# Patient Record
Sex: FEMALE | Race: WHITE | NOT HISPANIC OR LATINO | Employment: PART TIME | URBAN - METROPOLITAN AREA
[De-identification: names, ages, dates, MRNs, and addresses within clinical notes are randomized per-mention and may not be internally consistent; named-entity substitution may affect disease eponyms.]

---

## 2017-04-06 ENCOUNTER — CONVERSION ENCOUNTER (OUTPATIENT)
Dept: RADIOLOGY | Facility: IMAGING CENTER | Age: 47
End: 2017-04-06

## 2017-08-24 LAB — TSH SERPL-ACNC: 3.9 MIU/L

## 2018-06-15 ENCOUNTER — TRANSCRIBE ORDERS (OUTPATIENT)
Dept: ADMINISTRATIVE | Facility: HOSPITAL | Age: 48
End: 2018-06-15

## 2018-06-15 DIAGNOSIS — Z12.31 VISIT FOR SCREENING MAMMOGRAM: Primary | ICD-10-CM

## 2018-07-10 ENCOUNTER — OFFICE VISIT (OUTPATIENT)
Dept: FAMILY MEDICINE CLINIC | Facility: CLINIC | Age: 48
End: 2018-07-10
Payer: MEDICARE

## 2018-07-10 VITALS
OXYGEN SATURATION: 84 % | HEIGHT: 66 IN | WEIGHT: 293 LBS | DIASTOLIC BLOOD PRESSURE: 68 MMHG | HEART RATE: 90 BPM | BODY MASS INDEX: 47.09 KG/M2 | TEMPERATURE: 96.3 F | RESPIRATION RATE: 16 BRPM | SYSTOLIC BLOOD PRESSURE: 122 MMHG

## 2018-07-10 DIAGNOSIS — I10 ESSENTIAL HYPERTENSION: ICD-10-CM

## 2018-07-10 DIAGNOSIS — R60.9 EDEMA, UNSPECIFIED TYPE: ICD-10-CM

## 2018-07-10 DIAGNOSIS — R53.82 CHRONIC FATIGUE: Primary | ICD-10-CM

## 2018-07-10 PROBLEM — R13.10 DYSPHAGIA: Status: ACTIVE | Noted: 2017-12-22

## 2018-07-10 PROBLEM — E03.9 HYPOTHYROIDISM: Status: ACTIVE | Noted: 2017-03-17

## 2018-07-10 PROBLEM — G47.33 OSA (OBSTRUCTIVE SLEEP APNEA): Status: ACTIVE | Noted: 2018-04-24

## 2018-07-10 PROBLEM — R07.2 PRECORDIAL PAIN: Status: ACTIVE | Noted: 2017-01-21

## 2018-07-10 PROBLEM — F31.9 BIPOLAR DISORDER (HCC): Status: ACTIVE | Noted: 2017-03-17

## 2018-07-10 PROCEDURE — 99214 OFFICE O/P EST MOD 30 MIN: CPT | Performed by: NURSE PRACTITIONER

## 2018-07-10 RX ORDER — ZOLPIDEM TARTRATE 10 MG/1
10 TABLET ORAL DAILY
COMMUNITY
Start: 2017-12-05 | End: 2018-07-23

## 2018-07-10 RX ORDER — LEVOTHYROXINE SODIUM 0.05 MG/1
TABLET ORAL EVERY 24 HOURS
COMMUNITY
Start: 2017-10-11 | End: 2018-08-29

## 2018-07-10 RX ORDER — OMEPRAZOLE 40 MG/1
40 CAPSULE, DELAYED RELEASE ORAL 2 TIMES DAILY
COMMUNITY
End: 2018-09-19 | Stop reason: HOSPADM

## 2018-07-10 RX ORDER — GABAPENTIN 300 MG/1
600 CAPSULE ORAL
Refills: 0 | COMMUNITY
Start: 2018-05-26 | End: 2018-07-10

## 2018-07-10 RX ORDER — FUROSEMIDE 20 MG/1
20 TABLET ORAL DAILY
COMMUNITY
End: 2019-04-08

## 2018-07-10 RX ORDER — FESOTERODINE FUMARATE 4 MG/1
4 TABLET, EXTENDED RELEASE ORAL
COMMUNITY
Start: 2018-05-02 | End: 2018-07-10

## 2018-07-10 RX ORDER — LOSARTAN POTASSIUM 50 MG/1
50 TABLET ORAL DAILY
Refills: 0 | COMMUNITY
Start: 2018-06-16 | End: 2019-04-08 | Stop reason: SDUPTHER

## 2018-07-10 RX ORDER — PRAMIPEXOLE DIHYDROCHLORIDE 0.25 MG/1
TABLET ORAL
COMMUNITY
Start: 2017-12-05 | End: 2018-07-10

## 2018-07-10 RX ORDER — MONTELUKAST SODIUM 4 MG/1
1 TABLET, CHEWABLE ORAL 2 TIMES DAILY
Refills: 6 | COMMUNITY
Start: 2018-06-21 | End: 2018-07-10

## 2018-07-10 RX ORDER — POTASSIUM CHLORIDE 20 MEQ/1
20 TABLET, EXTENDED RELEASE ORAL DAILY
Refills: 0 | COMMUNITY
Start: 2018-06-16 | End: 2018-11-14 | Stop reason: SDUPTHER

## 2018-07-10 RX ORDER — LIDOCAINE HYDROCHLORIDE 20 MG/ML
15 INJECTION, SOLUTION INFILTRATION; PERINEURAL
COMMUNITY
End: 2018-07-23

## 2018-07-10 RX ORDER — DICYCLOMINE HYDROCHLORIDE 10 MG/1
10 CAPSULE ORAL 4 TIMES DAILY PRN
Refills: 0 | COMMUNITY
Start: 2018-05-22 | End: 2018-07-10

## 2018-07-10 RX ORDER — HYDROCODONE BITARTRATE AND ACETAMINOPHEN 7.5; 325 MG/1; MG/1
TABLET ORAL
Refills: 0 | COMMUNITY
Start: 2018-06-25 | End: 2018-08-09 | Stop reason: ALTCHOICE

## 2018-07-10 RX ORDER — METOPROLOL TARTRATE 50 MG/1
50 TABLET, FILM COATED ORAL 2 TIMES DAILY WITH MEALS
Refills: 0 | COMMUNITY
Start: 2018-05-22 | End: 2018-08-29

## 2018-07-10 RX ORDER — QUETIAPINE FUMARATE 300 MG/1
100 TABLET, FILM COATED ORAL
COMMUNITY
Start: 2015-09-30 | End: 2018-08-09 | Stop reason: ALTCHOICE

## 2018-07-10 RX ORDER — AMLODIPINE BESYLATE 10 MG/1
10 TABLET ORAL DAILY
COMMUNITY
End: 2018-07-10 | Stop reason: SDUPTHER

## 2018-07-10 RX ORDER — FLUCONAZOLE 150 MG/1
TABLET ORAL
COMMUNITY
Start: 2017-09-13 | End: 2018-07-10

## 2018-07-10 RX ORDER — TIZANIDINE 4 MG/1
4 TABLET ORAL EVERY 8 HOURS
COMMUNITY
Start: 2017-05-03 | End: 2018-07-17 | Stop reason: SDUPTHER

## 2018-07-10 RX ORDER — IBUPROFEN 800 MG/1
TABLET ORAL 3 TIMES DAILY
COMMUNITY
Start: 2015-04-29 | End: 2018-08-29

## 2018-07-10 RX ORDER — AMLODIPINE BESYLATE 10 MG/1
10 TABLET ORAL DAILY
Qty: 30 TABLET | Refills: 5 | Status: SHIPPED | OUTPATIENT
Start: 2018-07-10 | End: 2018-09-19 | Stop reason: HOSPADM

## 2018-07-10 RX ORDER — LEVOTHYROXINE SODIUM 0.2 MG/1
250 TABLET ORAL
COMMUNITY
End: 2018-07-10 | Stop reason: SDUPTHER

## 2018-07-10 RX ORDER — SERTRALINE HYDROCHLORIDE 100 MG/1
200 TABLET, FILM COATED ORAL
COMMUNITY
End: 2018-08-09 | Stop reason: ALTCHOICE

## 2018-07-10 RX ORDER — LEVOTHYROXINE SODIUM 0.2 MG/1
TABLET ORAL EVERY 24 HOURS
COMMUNITY
Start: 2017-10-11 | End: 2018-08-29

## 2018-07-10 RX ORDER — ALBUTEROL SULFATE 90 UG/1
AEROSOL, METERED RESPIRATORY (INHALATION)
COMMUNITY
Start: 2014-11-06 | End: 2018-08-14 | Stop reason: SDUPTHER

## 2018-07-10 NOTE — PROGRESS NOTES
Assessment/Plan:    Edema  Chronic lower extremity edema   Sees vascular twice a year  Due soon for u/s          Diagnoses and all orders for this visit:    Chronic fatigue  Seeing psychiatry for med eval   See if medications could be cause      Essential hypertension  -     amLODIPine (NORVASC) 10 mg tablet; Take 1 tablet (10 mg total) by mouth daily    Edema, unspecified type  -     Compression Stocking    Other orders  -     zolpidem (AMBIEN) 10 mg tablet; Take 10 mg by mouth daily  -     Discontinue: amLODIPine (NORVASC) 10 mg tablet; Take 10 mg by mouth daily  -     BusPIRone HCl (BUSPAR PO); Take 15 mg by mouth Every 12 hours  -     Discontinue: colestipol (COLESTID) 1 g tablet; Take 1 g by mouth 2 (two) times a day  -     Discontinue: dicyclomine (BENTYL) 10 mg capsule; Take 10 mg by mouth 4 (four) times a day as needed  -     Discontinue: Fesoterodine Fumarate ER 4 MG TB24; Take 4 mg by mouth  -     Discontinue: fluconazole (DIFLUCAN) 150 mg tablet; take 1 tablet by oral route once  -     furosemide (LASIX) 20 mg tablet; Take 20 mg by mouth  -     Discontinue: gabapentin (NEURONTIN) 300 mg capsule; Take 600 mg by mouth daily at bedtime  -     HYDROcodone-acetaminophen (NORCO) 7 5-325 mg per tablet; TAKE 1 TO 2 TABLETS EVERY 6 HOURS AS NEEDED FOR SEVERE PAIN; MAX 8 PER DAY  -     Discontinue: levothyroxine 200 mcg tablet; Take 250 mcg by mouth  -     lidocaine (XYLOCAINE) 2 %; 15 mL by Transmucosal route every 3 (three) hours  -     losartan (COZAAR) 50 mg tablet;   -     Discontinue: metoprolol tartrate (LOPRESSOR) 25 mg tablet; Take 50 mg by mouth  -     metoprolol tartrate (LOPRESSOR) 50 mg tablet; Take 50 mg by mouth 2 (two) times a day with meals  -     omeprazole (PriLOSEC) 40 MG capsule;  Take 40 mg by mouth  -     potassium chloride (K-DUR,KLOR-CON) 20 mEq tablet;   -     Discontinue: pramipexole (MIRAPEX) 0 25 mg tablet; take 1 tablet by oral route twice a day  -     QUEtiapine (SEROquel) 300 mg tablet; Take 600 mg by mouth  -     sertraline (ZOLOFT) 100 mg tablet; Take 200 mg by mouth    -     levothyroxine (SYNTHROID) 200 mcg tablet; every 24 hours  -     levothyroxine (SYNTHROID) 50 mcg tablet; every 24 hours  -     tiZANidine (ZANAFLEX) 4 mg tablet; Take 4 mg by mouth every 8 (eight) hours  -     albuterol (VENTOLIN HFA) 90 mcg/act inhaler; inhale 2 puff by inhalation route  every 4 - 6 hours as needed  -     ibuprofen (MOTRIN) 800 mg tablet; 3 (three) times a day          Subjective:      Patient ID: Lilly Mendoza is a 56 y o  female  Having issues with sleep  States using CPAP and since then feels trouble waking up  Sleep  states he thinks her meds from psychiatry  Seeing psychiatry tonight  She will discuss the meds  She stopped the gabepentin thinking this triggered her RLS and maybe other symptoms  Her close friend   Long fight with cancer  Her best friend and her are no longer talking  Needs knee replacement and  Feels she can't get this now  Had scoping       Starting to isolate herself  Feeling like she should cut   Talked with therapist, who states if she cuts she is admitting her   Depressed about wt gain             Review of Systems   Constitutional: Positive for activity change, appetite change and fatigue  Negative for fever  HENT: Negative for congestion, rhinorrhea and sore throat  Respiratory: Positive for apnea  Negative for cough, chest tightness and shortness of breath  Cardiovascular: Negative for chest pain  Gastrointestinal: Negative for abdominal pain, constipation and diarrhea  Genitourinary: Negative for dysuria  Musculoskeletal: Positive for arthralgias  Neurological: Negative for dizziness, light-headedness, numbness and headaches  Psychiatric/Behavioral: The patient is nervous/anxious           Feeling depressed          Objective:  Vitals:    07/10/18 0858   BP: 122/68   BP Location: Right arm   Patient Position: Sitting Cuff Size: Large   Pulse: 90   Resp: 16   Temp: (!) 96 3 °F (35 7 °C)   TempSrc: Temporal   SpO2: (!) 84%   Weight: (!) 147 kg (324 lb 1 6 oz)   Height: 5' 5 75" (1 67 m)      Physical Exam   Constitutional: She appears well-developed and well-nourished  Eyes: Conjunctivae are normal  Pupils are equal, round, and reactive to light  Neck: No thyromegaly present  Cardiovascular: Normal rate, regular rhythm and normal heart sounds  No murmur heard  Pulmonary/Chest: Effort normal and breath sounds normal  She has no wheezes  Abdominal: Soft  Bowel sounds are normal    Psychiatric: Her speech is normal and behavior is normal  She exhibits a depressed mood  She expresses no suicidal ideation  Upset about her loss of her friend and her other friends death  Feeling lonely

## 2018-07-10 NOTE — PATIENT INSTRUCTIONS
Edema   WHAT YOU NEED TO KNOW:   What is edema? Edema is swelling throughout your body  Edema is usually a sign that you are retaining fluid  The swelling may be caused by heart failure or kidney, thyroid, or liver disease  It may also be caused by medicines such as antidepressants, blood pressure medicines, or hormones  Sudden swelling around the lips or face may be a sign of a severe allergic reaction  Swelling of an arm or leg may be caused by blockage of your veins  What other signs and symptoms may occur with edema? · Discomfort or tenderness in the swollen areas    · Tight and shiny skin over the swollen areas    · Weight gain  How is edema diagnosed? Your healthcare provider will ask about your symptoms and any other symptoms you have  He may also ask about any medical conditions you have  Your healthcare provider will examine your skin over the swollen areas  He may gently push on the swollen area for a short time to see if this leaves a dimple  He may also order tests to find the cause of your edema  How is edema treated and managed? Treatment for edema depends on the cause  Depending on your medical condition, you may be given medicine to help get rid of extra body fluid  Your healthcare provider may suggest that you do any of the following to help manage edema:  · Elevate  your arms or legs as directed  Raise them above the level of your heart as often as you can  This will help decrease swelling and pain  Prop them on pillows or blankets to keep them elevated comfortably  · Wear pressure stockings as directed  The stockings are tight and put pressure on your legs  This helps to keep fluid from collecting in your legs or ankles  · Limit your salt intake  Salt causes your body to hold water  Ask about any other changes to your diet  · Stay active  Do not stand or sit for long periods of time  Ask your healthcare provider about the best exercise plan for you      · Keep your skin moist using lotion, cream, or ointment  Ask your healthcare provider what to use and how often to use it  When should I contact my healthcare provider? · The swollen area feels cold and is pale or blue in color  · The swollen area feels warm, painful, and is red in color  · You have increased swelling or swelling in other parts of your body  · You have questions or concerns about your condition or care  When should I seek immediate care? · You have shortness of breath at rest, especially when you lie down  · You cough up pink, foamy sputum  · You have chest pain  · Your heartbeat is fast or uneven  CARE AGREEMENT:   You have the right to help plan your care  Learn about your health condition and how it may be treated  Discuss treatment options with your caregivers to decide what care you want to receive  You always have the right to refuse treatment  The above information is an  only  It is not intended as medical advice for individual conditions or treatments  Talk to your doctor, nurse or pharmacist before following any medical regimen to see if it is safe and effective for you  © 2017 [employer]  Information is for End User's use only and may not be sold, redistributed or otherwise used for commercial purposes  All illustrations and images included in CareNotes® are the copyrighted property of [employer]  or [employer]

## 2018-07-16 ENCOUNTER — TELEPHONE (OUTPATIENT)
Dept: FAMILY MEDICINE CLINIC | Facility: CLINIC | Age: 48
End: 2018-07-16

## 2018-07-16 NOTE — TELEPHONE ENCOUNTER
Patient called she said that she was given a script for compression stockings she said that the size needs to be changed to 15-20 size the length is below knee MM HG is 15-20 I did inform her jose isn't in the office TY

## 2018-07-17 DIAGNOSIS — R60.0 EDEMA OF BOTH LEGS: ICD-10-CM

## 2018-07-17 DIAGNOSIS — R60.0 EDEMA OF BOTH LEGS: Primary | ICD-10-CM

## 2018-07-17 DIAGNOSIS — R26.9 ABNORMALITY OF GAIT AND MOBILITY: Primary | ICD-10-CM

## 2018-07-17 RX ORDER — TIZANIDINE 4 MG/1
4 TABLET ORAL 3 TIMES DAILY PRN
Qty: 30 TABLET | Refills: 12 | Status: SHIPPED | OUTPATIENT
Start: 2018-07-17 | End: 2018-09-19 | Stop reason: HOSPADM

## 2018-07-23 ENCOUNTER — HOSPITAL ENCOUNTER (EMERGENCY)
Facility: HOSPITAL | Age: 48
End: 2018-07-24
Attending: EMERGENCY MEDICINE | Admitting: EMERGENCY MEDICINE
Payer: MEDICARE

## 2018-07-23 VITALS
HEART RATE: 85 BPM | WEIGHT: 293 LBS | DIASTOLIC BLOOD PRESSURE: 71 MMHG | OXYGEN SATURATION: 94 % | RESPIRATION RATE: 18 BRPM | TEMPERATURE: 98 F | HEIGHT: 67 IN | SYSTOLIC BLOOD PRESSURE: 144 MMHG | BODY MASS INDEX: 45.99 KG/M2

## 2018-07-23 DIAGNOSIS — T14.91XA SUICIDAL BEHAVIOR WITH ATTEMPTED SELF-INJURY (HCC): Primary | ICD-10-CM

## 2018-07-23 DIAGNOSIS — F32.A DEPRESSED: ICD-10-CM

## 2018-07-23 LAB
AMPHETAMINES SERPL QL SCN: POSITIVE
BACTERIA UR QL AUTO: ABNORMAL /HPF
BARBITURATES UR QL: NEGATIVE
BENZODIAZ UR QL: NEGATIVE
BILIRUB UR QL STRIP: NEGATIVE
CLARITY UR: CLEAR
COCAINE UR QL: NEGATIVE
COLOR UR: YELLOW
ETHANOL EXG-MCNC: NORMAL MG/DL
GLUCOSE UR STRIP-MCNC: NEGATIVE MG/DL
HGB UR QL STRIP.AUTO: ABNORMAL
KETONES UR STRIP-MCNC: NEGATIVE MG/DL
LEUKOCYTE ESTERASE UR QL STRIP: NEGATIVE
METHADONE UR QL: NEGATIVE
NITRITE UR QL STRIP: NEGATIVE
NON-SQ EPI CELLS URNS QL MICRO: ABNORMAL /HPF
OPIATES UR QL SCN: NEGATIVE
PCP UR QL: NEGATIVE
PH UR STRIP.AUTO: 7 [PH] (ref 4.5–8)
PROT UR STRIP-MCNC: >=300 MG/DL
RBC #/AREA URNS AUTO: ABNORMAL /HPF
SP GR UR STRIP.AUTO: 1.02 (ref 1–1.03)
THC UR QL: POSITIVE
UROBILINOGEN UR QL STRIP.AUTO: 0.2 E.U./DL
WBC #/AREA URNS AUTO: ABNORMAL /HPF

## 2018-07-23 PROCEDURE — 81001 URINALYSIS AUTO W/SCOPE: CPT

## 2018-07-23 PROCEDURE — 96372 THER/PROPH/DIAG INJ SC/IM: CPT

## 2018-07-23 PROCEDURE — 80307 DRUG TEST PRSMV CHEM ANLYZR: CPT | Performed by: EMERGENCY MEDICINE

## 2018-07-23 PROCEDURE — 82075 ASSAY OF BREATH ETHANOL: CPT | Performed by: EMERGENCY MEDICINE

## 2018-07-23 PROCEDURE — 90715 TDAP VACCINE 7 YRS/> IM: CPT | Performed by: EMERGENCY MEDICINE

## 2018-07-23 RX ORDER — ZIPRASIDONE MESYLATE 20 MG/ML
20 INJECTION, POWDER, LYOPHILIZED, FOR SOLUTION INTRAMUSCULAR ONCE
Status: COMPLETED | OUTPATIENT
Start: 2018-07-23 | End: 2018-07-23

## 2018-07-23 RX ORDER — ZIPRASIDONE MESYLATE 20 MG/ML
INJECTION, POWDER, LYOPHILIZED, FOR SOLUTION INTRAMUSCULAR
Status: COMPLETED
Start: 2018-07-23 | End: 2018-07-23

## 2018-07-23 RX ORDER — BUPROPION HYDROCHLORIDE 150 MG/1
150 TABLET ORAL 2 TIMES DAILY
COMMUNITY
End: 2018-08-09 | Stop reason: ALTCHOICE

## 2018-07-23 RX ORDER — LORAZEPAM 2 MG/ML
2 INJECTION INTRAMUSCULAR ONCE
Status: COMPLETED | OUTPATIENT
Start: 2018-07-23 | End: 2018-07-23

## 2018-07-23 RX ORDER — PILOCARPINE HYDROCHLORIDE 5 MG/1
5 TABLET, FILM COATED ORAL 3 TIMES DAILY
COMMUNITY
End: 2018-08-29

## 2018-07-23 RX ORDER — FESOTERODINE FUMARATE 4 MG/1
4 TABLET, EXTENDED RELEASE ORAL DAILY
COMMUNITY
End: 2018-08-29

## 2018-07-23 RX ADMIN — WATER 1.2 ML: 1 INJECTION INTRAMUSCULAR; INTRAVENOUS; SUBCUTANEOUS at 22:50

## 2018-07-23 RX ADMIN — LORAZEPAM 2 MG: 2 INJECTION INTRAMUSCULAR; INTRAVENOUS at 22:38

## 2018-07-23 RX ADMIN — ZIPRASIDONE MESYLATE 20 MG: 20 INJECTION, POWDER, LYOPHILIZED, FOR SOLUTION INTRAMUSCULAR at 22:49

## 2018-07-23 RX ADMIN — TETANUS TOXOID, REDUCED DIPHTHERIA TOXOID AND ACELLULAR PERTUSSIS VACCINE, ADSORBED 0.5 ML: 5; 2.5; 8; 8; 2.5 SUSPENSION INTRAMUSCULAR at 21:50

## 2018-07-23 NOTE — ED NOTES
3rd pt belonging bag placed in zone 5 med room counter top along with 2 other bags        Kunal Brownlee  07/23/18 1932

## 2018-07-23 NOTE — ED PROVIDER NOTES
History  Chief Complaint   Patient presents with    Psychiatric Evaluation     911 called by patient's  due to increased cutting to left wrist  Patient states she "wants to hurt herself, but does not have a plan "     HPI  52yo female pmhx bipolar, depression, asthma, HTN, obesity brought in by police for psych evaluation  Patient's  called police due to patient cutting her wrists more frequently  Patient is a poor historian but admits to cutting her left wrist with a razor blade today and her legs last night  Patient says she does want to kill herself by cutting or taking ambien  Patient says a close friend recently  of cancer and she has been very depressed  She admits to smoking marijuana and using meth today  She denies alcohol use  Denies visual or auditory hallucinations  Denies fever, chills, chest pain, SOB, nausea, vomiting, abdominal pain, diarrhea or dysuria  Prior to Admission Medications   Prescriptions Last Dose Informant Patient Reported? Taking?    BusPIRone HCl (BUSPAR PO) 2018 at Unknown time  Yes Yes   Sig: Take 30 mg by mouth Every 12 hours     Fesoterodine Fumarate ER (TOVIAZ) 4 MG  at Unknown time  Yes Yes   Sig: Take 4 mg by mouth daily   HYDROcodone-acetaminophen (NORCO) 7 5-325 mg per tablet   Yes No   Sig: TAKE 1 TO 2 TABLETS EVERY 6 HOURS AS NEEDED FOR SEVERE PAIN; MAX 8 PER DAY   Lurasidone HCl (LATUDA) 60 MG TABS 2018 at Unknown time  Yes Yes   Sig: Take 60 mg by mouth daily   QUEtiapine (SEROquel) 300 mg tablet 2018 at Unknown time  Yes Yes   Sig: Take 100 mg by mouth daily at bedtime     albuterol (VENTOLIN HFA) 90 mcg/act inhaler Past Month at Unknown time  Yes Yes   Sig: inhale 2 puff by inhalation route  every 4 - 6 hours as needed   amLODIPine (NORVASC) 10 mg tablet 2018 at Unknown time  No Yes   Sig: Take 1 tablet (10 mg total) by mouth daily   buPROPion (WELLBUTRIN XL) 150 mg 24 hr tablet 2018 at Unknown time Yes Yes   Sig: Take 150 mg by mouth 2 (two) times a day   furosemide (LASIX) 20 mg tablet 7/23/2018 at Unknown time  Yes Yes   Sig: Take 20 mg by mouth   ibuprofen (MOTRIN) 800 mg tablet 7/23/2018 at Unknown time  Yes Yes   Sig: 3 (three) times a day   levothyroxine (SYNTHROID) 200 mcg tablet 7/23/2018 at Unknown time  Yes Yes   Sig: every 24 hours   levothyroxine (SYNTHROID) 50 mcg tablet 7/23/2018 at Unknown time  Yes Yes   Sig: every 24 hours   losartan (COZAAR) 50 mg tablet 7/23/2018 at Unknown time  Yes Yes   metoprolol tartrate (LOPRESSOR) 50 mg tablet 7/23/2018 at Unknown time  Yes Yes   Sig: Take 50 mg by mouth 2 (two) times a day with meals   omeprazole (PriLOSEC) 40 MG capsule 7/23/2018 at Unknown time  Yes Yes   Sig: Take 40 mg by mouth   pilocarpine (SALAGEN) 5 mg tablet 7/23/2018 at Unknown time  Yes Yes   Sig: Take 5 mg by mouth 3 (three) times a day   potassium chloride (K-DUR,KLOR-CON) 20 mEq tablet 7/23/2018 at Unknown time  Yes Yes   sertraline (ZOLOFT) 100 mg tablet   Yes No   Sig: Take 200 mg by mouth     tiZANidine (ZANAFLEX) 4 mg tablet 7/23/2018 at Unknown time  No Yes   Sig: Take 1 tablet (4 mg total) by mouth 3 (three) times a day as needed for muscle spasms      Facility-Administered Medications: None       Past Medical History:   Diagnosis Date    [facility]     Cellulitis of abdominal wall     Depression 09/16/2008    MARTINEZ (dyspnea on exertion)     Drug overdose 10/28/2008    suicide attempt    Dyspepsia     chronic    Dysphagia     Dyspnea     Edema     High blood pressure     Suicidal ideations        Past Surgical History:   Procedure Laterality Date    BREAST LUMPECTOMY Right     CHOLECYSTECTOMY      5/2003    COLONOSCOPY      01/12/2009    LAPAROSCOPIC HYSTERECTOMY      REPAIR LACERATION Left     left hand-5/18/2009    TONSILECTOMY AND ADNOIDECTOMY         Family History   Problem Relation Age of Onset    Kidney cancer Mother     Colon cancer Family      I have reviewed and agree with the history as documented  Social History   Substance Use Topics    Smoking status: Former Smoker     Types: Cigarettes     Quit date: 1/1/2014    Smokeless tobacco: Former User      Comment: 3 packs per day    Alcohol use No        Review of Systems   Constitutional: Positive for appetite change  Negative for chills, diaphoresis, fatigue and fever  HENT: Negative for congestion, rhinorrhea and sore throat  Eyes: Negative for photophobia and visual disturbance  Respiratory: Negative for cough, chest tightness and shortness of breath  Cardiovascular: Negative for chest pain and palpitations  Gastrointestinal: Negative for abdominal pain, blood in stool, constipation, diarrhea, nausea and vomiting  Genitourinary: Negative for dysuria, frequency and hematuria  Musculoskeletal: Negative for back pain, gait problem, myalgias, neck pain and neck stiffness  Skin: Positive for wound  Negative for pallor and rash  Neurological: Negative for weakness, light-headedness, numbness and headaches  Hematological: Negative for adenopathy  Does not bruise/bleed easily  Psychiatric/Behavioral: Positive for dysphoric mood, self-injury and suicidal ideas  Negative for hallucinations  The patient is nervous/anxious  All other systems reviewed and are negative        Physical Exam  ED Triage Vitals   Temperature Pulse Respirations Blood Pressure SpO2   07/23/18 1706 07/23/18 1715 07/23/18 1715 07/23/18 1715 07/23/18 1715   98 °F (36 7 °C) 94 17 (!) 180/84 95 %      Temp Source Heart Rate Source Patient Position - Orthostatic VS BP Location FiO2 (%)   07/23/18 1706 07/23/18 1715 07/23/18 1715 07/23/18 1715 --   Oral Monitor Sitting Right arm       Pain Score       07/23/18 1715       3           Orthostatic Vital Signs  Vitals:    07/23/18 1928 07/23/18 2050 07/23/18 2158 07/23/18 2330   BP: (!) 211/98 155/70 (!) 174/99 144/71   Pulse: 86 81 80 85   Patient Position - Orthostatic VS: Lying Lying Lying Lying       Physical Exam   Constitutional: She is oriented to person, place, and time  No distress  Patient is alert and oriented, appears dysphoric and does not want to talk but answers when encouraged, morbidly obese   HENT:   Head: Normocephalic and atraumatic  Mouth/Throat: Oropharynx is clear and moist  No oropharyngeal exudate  Eyes: Conjunctivae and EOM are normal  Pupils are equal, round, and reactive to light  Neck: Normal range of motion  Neck supple  Cardiovascular: Normal rate, regular rhythm, normal heart sounds and intact distal pulses  Pulmonary/Chest: Effort normal and breath sounds normal  No respiratory distress  Abdominal: Soft  Bowel sounds are normal  She exhibits no distension  There is no tenderness  Musculoskeletal: Normal range of motion  Superficial lacerations of left wrist, no active bleeding, neurovascularly intact   Lymphadenopathy:     She has no cervical adenopathy  Neurological: She is alert and oriented to person, place, and time  No facial asymmetry noted, CN 2-12 intact, full ROM of upper and lower extremities, muscle strength 5/5 throughout, DTRs normal, sensation intact throughout   Skin: Skin is warm and dry  Capillary refill takes less than 2 seconds  No rash noted  She is not diaphoretic  No erythema  No pallor  Psychiatric:   Depressed, suicidal    Nursing note and vitals reviewed        ED Medications  Medications   tetanus-diphtheria-acellular pertussis (BOOSTRIX) IM injection 0 5 mL (0 5 mL Intramuscular Given 7/23/18 2150)   LORazepam (ATIVAN) 2 mg/mL injection 2 mg (2 mg Intramuscular Given 7/23/18 2238)   ziprasidone (GEODON) IM injection 20 mg (20 mg Intramuscular Given 7/23/18 2249)   sterile water injection **AcuDose Override Pull** (1 2 mL  Given 7/23/18 2250)       Diagnostic Studies  Results Reviewed     Procedure Component Value Units Date/Time    Urine Microscopic [[id number]]  (Abnormal) Collected: 07/23/18 2045    Lab Status:  Final result Specimen:  Urine from Urine, Clean Catch Updated:  07/23/18 2113     RBC, UA None Seen /hpf      WBC, UA None Seen /hpf      Epithelial Cells Moderate (A) /hpf      Bacteria, UA None Seen /hpf     Rapid drug screen, urine [[id number]]  (Abnormal) Collected:  07/23/18 1934    Lab Status:  Final result Specimen:  Urine from Urine, Clean Catch Updated:  07/23/18 2040     Amph/Meth UR Positive (A)     Barbiturate Ur Negative     Benzodiazepine Urine Negative     Cocaine Urine Negative     Methadone Urine Negative     Opiate Urine Negative     PCP Ur Negative     THC Urine Positive (A)    Narrative:         Presumptive report  If requested, specimen will be sent to reference lab for confirmation  FOR MEDICAL PURPOSES ONLY  IF CONFIRMATION NEEDED PLEASE CONTACT THE LAB WITHIN 5 DAYS      Drug Screen Cutoff Levels:  AMPHETAMINE/METHAMPHETAMINES  1000 ng/mL  BARBITURATES     200 ng/mL  BENZODIAZEPINES     200 ng/mL  COCAINE      300 ng/mL  METHADONE      300 ng/mL  OPIATES      300 ng/mL  PHENCYCLIDINE     25 ng/mL  THC       50 ng/mL    ED Urine Macroscopic [[id number]]  (Abnormal) Collected:  07/23/18 2045    Lab Status:  Final result Specimen:  Urine Updated:  07/23/18 2034     Color, UA Yellow     Clarity, UA Clear     pH, UA 7 0     Leukocytes, UA Negative     Nitrite, UA Negative     Protein, UA >=300 (A) mg/dl      Glucose, UA Negative mg/dl      Ketones, UA Negative mg/dl      Urobilinogen, UA 0 2 E U /dl      Bilirubin, UA Negative     Blood, UA Trace (A)     Specific [place], UA 1 025    Narrative:       CLINITEK RESULT    POCT alcohol breath test [[id number]]  (Normal) Resulted:  07/23/18 1718    Lab Status:  Final result Updated:  07/23/18 1718     EXTBreath Alcohol BAT 0 000                 No orders to display         Procedures  Procedures      Phone Consults  ED Phone Contact    ED Course  ED Course as of Jul 24 1440   Mon Jul 23, 2018 1956 Patient was found to be cutting her left wrist while in the bathroom  Placed on one to one  Left wrist irrigated and wrapped  MDM  Number of Diagnoses or Management Options  Depressed:   Suicidal behavior with attempted self-injury [facility]):   Diagnosis management comments: Impression: 54yo female presents for psych evaluation   Ddx: suicidal evaluation   Plan: BAT, urine drug, crisis, admit     CritCare Time    Disposition  Final diagnoses:   Suicidal behavior with attempted self-injury [facility])   Depressed     Time reflects when diagnosis was documented in both MDM as applicable and the Disposition within this note     Time User Action Codes Description Comment    7/23/2018 10:42 PM [facility]il[facility] Suicidal behavior with attempted self-injury ([address] )     7/23/2018 10:42 PM Darío Harrell Add [F32 9] Depressed       ED Disposition     ED Disposition Condition Comment    Transfer to Another [address] should be transferred out to [facility] to care of Dr Milo Salguero MD Documentation      Most Recent Value   Patient Condition  The patient has been stabilized such that within reasonable medical probability, no material deterioration of the patient condition or the condition of the unborn child(jessica) is likely to result from the transfer   Reason for Transfer  Level of Care needed not available at this facility   Benefits of Transfer  Specialized equipment and/or services available at the receiving facility (Include comment)________________________ [psychiatric ]   Risks of Transfer  Potential for delay in receiving treatment   Accepting Physician  Dr Nasir De Jesus Name, [address] [facility]     (Name & Tel number)  Jared Marshall, [phone]   Transported by (Company and Unit #)  [facility]   Provider Certification  The patient is stable for psychiatric transfer because they are medically stable, and is protected from harming him/herself or others during transport      RN Documentation      [facility] Name, [address]     (Name & Tel number)  Regina Erwin, [phone]   Transport Mode  Ambulance   Transported by [employer] and Unit #)  [facility]   Level of Care  Basic life support   Transfer Date  07/24/18   Transfer Time  0130      Follow-up Information    None         Discharge Medication List as of 7/24/2018  1:16 AM      CONTINUE these medications which have NOT CHANGED    Details   albuterol (VENTOLIN HFA) 90 mcg/act inhaler inhale 2 puff by inhalation route  every 4 - 6 hours as needed, Historical Med      amLODIPine (NORVASC) 10 mg tablet Take 1 tablet (10 mg total) by mouth daily, Starting Tue 7/10/2018, Normal      buPROPion (WELLBUTRIN XL) 150 mg 24 hr tablet Take 150 mg by mouth 2 (two) times a day, Historical Med      BusPIRone HCl (BUSPAR PO) Take 30 mg by mouth Every 12 hours  , Starting Mon 6/27/2011, Historical Med      Fesoterodine Fumarate ER (TOVIAZ) 4 MG TB24 Take 4 mg by mouth daily, Historical Med      furosemide (LASIX) 20 mg tablet Take 20 mg by mouth, Historical Med      ibuprofen (MOTRIN) 800 mg tablet 3 (three) times a day, Starting Wed 4/29/2015, Historical Med      !! levothyroxine (SYNTHROID) 200 mcg tablet every 24 hours, Starting Wed 10/11/2017, Historical Med      !! levothyroxine (SYNTHROID) 50 mcg tablet every 24 hours, Starting Wed 10/11/2017, Historical Med      losartan (COZAAR) 50 mg tablet Starting Sat 6/16/2018, Historical Med      Lurasidone HCl (LATUDA) 60 MG TABS Take 60 mg by mouth daily, Historical Med      metoprolol tartrate (LOPRESSOR) 50 mg tablet Take 50 mg by mouth 2 (two) times a day with meals, Starting Tue 5/22/2018, Historical Med      omeprazole (PriLOSEC) 40 MG capsule Take 40 mg by mouth, Historical Med      pilocarpine (SALAGEN) 5 mg tablet Take 5 mg by mouth 3 (three) times a day, Historical Med      potassium chloride (K-DUR,KLOR-CON) 20 mEq tablet Starting Sat 6/16/2018, Historical Med      QUEtiapine (SEROquel) 300 mg tablet Take 100 mg by mouth daily at bedtime  , Starting Wed 9/30/2015, Historical Med      tiZANidine (ZANAFLEX) 4 mg tablet Take 1 tablet (4 mg total) by mouth 3 (three) times a day as needed for muscle spasms, Starting Tue 7/17/2018, Normal      HYDROcodone-acetaminophen (NORCO) 7 5-325 mg per tablet TAKE 1 TO 2 TABLETS EVERY 6 HOURS AS NEEDED FOR SEVERE PAIN; MAX 8 PER DAY, Historical Med      sertraline (ZOLOFT) 100 mg tablet Take 200 mg by mouth  , Historical Med       !! - Potential duplicate medications found  Please discuss with provider  No discharge procedures on file  ED Provider  Attending physically available and evaluated Connie Zhang  MICHELET managed the patient along with the ED Attending      Electronically Signed by         Francois Knott MD  07/24/18 1680

## 2018-07-23 NOTE — ED NOTES
Patient escorted into bathroom by ED tech to change into blue scrubs  After 10 minutes patient refusing to open bathroom door  Door opened and patient found cutting left wrist with a razor blade  Superficial cuts noted to left wrist, no active bleeding noted        Jame Johnson RN  07/23/18 5644

## 2018-07-23 NOTE — ED NOTES
Pt came into the ED  The pt [facility] worker supervisor Doe Ch from [facility] [place] told the pt that she had to come the ED for a psychiatric evaluation do to the pt self harm and SI with a plan  The pt came to the ED and states that she has increase depression recently since her best friend  of cancer  The pt is very flat and tearful  The pt has been self harming  Superficial cuts on her left wrist and thighs The pt is also endorse in the ED that she was SI with a plan to OD or cut her wrists  The pt states that she just doesn't want to be here anymore  The pt is a danger to self  The pt was offered and sign a 201  Dr Tho Hayden in agreement

## 2018-07-23 NOTE — ED NOTES
Pt escorted by this tech to bathroom to change and provide a urine sample  Pt was in bathroom for 3 minutes, this tech checked on pt who refused to open door or speak  Door was unlocked and opened from the outside  Pt was standing against wall with paper towels pressed against wrist and a razor blade in hand  Pt dropped razor blade on trash can  Pt was shaking and unsteady on feet  Superficial cuts were noted to pt's left wrist and there was no active bleeding  Security was paged at this time and pt was walked to room 26 to be searched and changed  Dr Mari Velazquez at bedside as well  Pt changed into paper scrubs and urine sample was provided by pt  Pt back in [address] stretcher at this time        Alanna Woo  07/23/18 1924

## 2018-07-23 NOTE — ED ATTENDING ATTESTATION
I, Quang Aquino DO, saw and evaluated the patient  I have discussed the patient with the resident/non-physician practitioner and agree with the resident's/non-physician practitioner's findings, Plan of Care, and MDM as documented in the resident's/non-physician practitioner's note, except where noted  All available labs and Radiology studies were reviewed  At this point I agree with the current assessment done in the Emergency Department  I have conducted an independent evaluation of this patient a history and physical is as follows:      Critical Care Time  CritCare Time    Procedures     56 yr old fem to the ED with suicidal thoughts with incr in depression from mult reason  Recent friend  of [place]  Lacerations to leg last night and left wrist today  Use of meth and weed today  Exm; morbidly obese, lungs: cta, abd: soft and nontender  Superficial cuts to left wrist   Systolic pressure elevation  Heart: rrr wo mrg    Pln: UDS and crisis eval

## 2018-07-24 ENCOUNTER — TELEPHONE (OUTPATIENT)
Dept: FAMILY MEDICINE CLINIC | Facility: CLINIC | Age: 48
End: 2018-07-24

## 2018-07-24 PROCEDURE — 90471 IMMUNIZATION ADMIN: CPT

## 2018-07-24 PROCEDURE — 99285 EMERGENCY DEPT VISIT HI MDM: CPT

## 2018-07-24 NOTE — EMTALA/ACUTE CARE TRANSFER
[address]  [address] [address]  Dept: [address]  TRANSFER CONSENT    NAME Jhon Hagen                                         1970                              MRN [id number]    I have been informed of my rights regarding examination, treatment, and transfer   by Dr Khanh Sharp MD    Benefits: Specialized equipment and/or services available at the receiving facility (Include comment)________________________ (psychiatric )    Risks: Potential for delay in receiving treatment      Transfer Refusal:  I acknowledge that my medical condition has been evaluated and explained to me by the emergency department physician or other qualified medical person and/or my attending physician, who has recommended that I be transferred to the service of  Accepting Physician: Dr Benjamin Conde at [address] [place] [address] Name, [address] : [facility]   The above potential benefits of such transfer, the potential risks associated with transfer, and the probable risks of not being transferred have been explained to me, and I fully understand them  I nevertheless refuse to be transferred  I release the Hospital, the doctor, and any other persons caring for me from all responsibility or liability for any injury or ill effects that may result from this refusal and agree to accept all responsibility for the consequences of my refusal     I authorize the performance of emergency medical procedures and treatments upon me in both transit and upon arrival at the receiving facility  Additionally, I authorize the release of any and all medical records to the receiving facility and request they be transported with me, if possible  I understand that the safest mode of transportation during a medical emergency is an ambulance and that the Hospital advocates the use of this mode of transport   Risks of traveling to the receiving facility by car, including absence of medical control, life sustaining equipment, such as oxygen, and medical personnel has been explained to me and I fully understand them  (LONI CORRECT BOX BELOW)  [  ]  I consent to the stated transfer and to be transported by ambulance/helicopter  [  ]  I consent to the stated transfer, but refuse transportation by ambulance and accept full responsibility for my transportation by car  I understand the risks of non-ambulance transfers and I exonerate the Hospital and its staff from any deterioration in my condition that results from this refusal     X___________________________________________    DATE  18  TIME________  Signature of patient or legally responsible individual signing on patient behalf           RELATIONSHIP TO PATIENT_________________________          Provider Certification    NAME Stoney Foster                                        [facility] 1970                              MRN [id number]    A medical screening exam was performed on the above named patient  Based on the examination:    Condition Necessitating Transfer The primary encounter diagnosis was Suicidal behavior with attempted self-injury ([facility] [address] )  A diagnosis of Depressed was also pertinent to this visit      Patient Condition: The patient has been stabilized such that within reasonable medical probability, no material deterioration of the patient condition or the condition of the unborn child(jessica) is likely to result from the transfer    Reason for Transfer: Level of Care needed not available at this facility    Transfer Requirements: [facility]    · Space available and qualified personnel available for treatment as acknowledged by Bethany Avery, [phone]  · Agreed to accept transfer and to provide appropriate medical treatment as acknowledged by       Dr Avelina Cisneros  · Appropriate medical records of the examination and treatment of the patient are provided at the time of transfer   STAFF INITIAL WHEN COMPLETED _______  · Transfer will be performed by qualified personnel from [address] [place] [address]  and appropriate transfer equipment as required, including the use of necessary and appropriate life support measures  Provider Certification: I have examined the patient and explained the following risks and benefits of being transferred/refusing transfer to the patient/family:  The patient is stable for psychiatric transfer because they are medically stable, and is protected from harming him/herself or others during transport      Based on these reasonable risks and benefits to the patient and/or the unborn child(jessica), and based upon the information available at the time of the patients examination, I certify that the medical benefits reasonably to be expected from the provision of appropriate medical treatments at another medical facility outweigh the increasing risks, if any, to the individuals medical condition, and in the case of labor to the unborn child, from effecting the transfer      X____________________________________________ DATE 07/24/18        TIME_______      ORIGINAL - SEND TO MEDICAL RECORDS   COPY - SEND WITH PATIENT DURING TRANSFER

## 2018-07-24 NOTE — ED NOTES
[place] admission Sharad Aguiar agreed to accept pt without the [place] pre-cert completed  Patient is accepted at [facility]  Patient is accepted by Latoya Parrish per admission Sharad Aguiar  Transportation is arranged with [address] is scheduled for 130 am  Patient may go to the floor once she arrives at the unit         Nurse report is not required prior to patient transfer

## 2018-07-24 NOTE — ED NOTES
Insurance Authorization: Primary insurance is [employer] by [employer] called them at [phone]  The pre-cert office was closed  Pre-cert wasn't able to be completed and needs to be completed during normal business hours  Insurance Authorization: Secondary [employer]  Phone call placed to [employer] number: [phone]  Spoke to Kim Bishop  They will follow the primary insurance  They request a call when pt is admit and a fax upon pt being DC

## 2018-07-24 NOTE — ED NOTES
Pt became upset with CW and started crying, yelling, and hitting/kicking the wall  Pt attempted to remove bandage covering superficial lacerations to left wrist  Security called at this time  Pt on stretcher, tearful and grabbing at bandages at this time        Yuki Frost  07/23/18 [facility]  07/23/18 [id number]

## 2018-07-24 NOTE — TELEPHONE ENCOUNTER
[facility] in [place] [facility] [address] is calling  Because is inpatient admitted if you have any question please call Deidre Juarez [phone] admission date 07/25/2018 thank you

## 2018-08-08 ENCOUNTER — TELEPHONE (OUTPATIENT)
Dept: BEHAVIORAL/MENTAL HEALTH CLINIC | Facility: CLINIC | Age: 48
End: 2018-08-08

## 2018-08-08 NOTE — TELEPHONE ENCOUNTER
Behavorial Health Outpatient Intake Questions    Referred by: [facility]    Check with provider before scheduling    Are there any developmental disabilities? No    Does the patient have hearing impairment? No    Does the patient have ICM or CTT? Yes MANFRED VALADEZ W/YVES GARYNATA    Taking injectable psychiatric medications? NoIf yes, patient can not be seen here  Has the patient ever seen or currently see a psychiatrist? Yes If yes who/when? SEEN BY DR LARKIN AT [facility]    Has the patient ever seen or currently see a therapist? Yes If yes who/when? [facility]    How many visits did the pt have for previous psychiatric treatment?  History    Has the patient served in the [place] [address]? No    If yes, have you had combat services? No    Was the patient activated into federal active duty as a member of the national guard or reserve? No    Minor Child    Who has custody of the child? Is there a custody agreement? If there is a custody agreement remind parent that they must bring a copy to the first appt or they will not be seen  Behavorial Health Outpatient Intake History     Presenting Problem (in patient's words) BIPOLAR,BORDERLINE PERSONALITY DISORDER    Substance Abuse: There is a documented history of alcohol abuse confirmed by the patient  SOBER X 2 YRS    Has the patient been seen here previously, either inpatient or outpatient? No outpatient    If seen as outpatient, what provider(s) did the patient see? N/A    A member of the patient's family has been in therapy here with     ACCEPTED as a patient Yes Appointment Date: 8/14/18  @ 1:00PM  [facility] [address] ASSESSMENT    Referred Elsewhere?  No    Primary Care Physician: Genesis Magana    PCP telephone number: [phone]    SUB: MARIA  INS: MEDICARE A&B  ID: [id number]  SECONDARY:  INS: MA  ID: [id number]

## 2018-08-09 ENCOUNTER — TRANSITIONAL CARE MANAGEMENT (OUTPATIENT)
Dept: FAMILY MEDICINE CLINIC | Facility: CLINIC | Age: 48
End: 2018-08-09

## 2018-08-10 RX ORDER — BUSPIRONE HYDROCHLORIDE 30 MG/1
15 TABLET ORAL 3 TIMES DAILY
Refills: 0 | COMMUNITY
Start: 2018-07-11 | End: 2018-09-19 | Stop reason: HOSPADM

## 2018-08-10 RX ORDER — METOPROLOL TARTRATE 50 MG/1
50 TABLET, FILM COATED ORAL
COMMUNITY
Start: 2018-05-22 | End: 2018-08-14 | Stop reason: SDUPTHER

## 2018-08-10 RX ORDER — FESOTERODINE FUMARATE 4 MG/1
TABLET, EXTENDED RELEASE ORAL EVERY 24 HOURS
COMMUNITY
End: 2018-08-14 | Stop reason: SDUPTHER

## 2018-08-10 RX ORDER — LEVOTHYROXINE SODIUM 0.05 MG/1
TABLET ORAL EVERY 24 HOURS
COMMUNITY
Start: 2017-10-11 | End: 2018-08-14 | Stop reason: SDUPTHER

## 2018-08-10 RX ORDER — IBUPROFEN 800 MG/1
TABLET ORAL 3 TIMES DAILY
COMMUNITY
Start: 2015-04-29 | End: 2018-08-14 | Stop reason: SDUPTHER

## 2018-08-10 RX ORDER — FLUCONAZOLE 150 MG/1
TABLET ORAL
COMMUNITY
Start: 2017-09-13 | End: 2018-08-14 | Stop reason: ALTCHOICE

## 2018-08-10 RX ORDER — TIZANIDINE 4 MG/1
TABLET ORAL
COMMUNITY
Start: 2018-07-27 | End: 2018-08-14 | Stop reason: ALTCHOICE

## 2018-08-10 RX ORDER — MONTELUKAST SODIUM 4 MG/1
1 TABLET, CHEWABLE ORAL 2 TIMES DAILY
Status: ON HOLD | COMMUNITY
Start: 2018-07-16 | End: 2018-09-11 | Stop reason: ALTCHOICE

## 2018-08-10 RX ORDER — BUPROPION HYDROCHLORIDE 150 MG/1
TABLET ORAL
COMMUNITY
Start: 2018-07-11 | End: 2018-08-14 | Stop reason: SDUPTHER

## 2018-08-10 RX ORDER — PRAMIPEXOLE DIHYDROCHLORIDE 0.25 MG/1
TABLET ORAL
COMMUNITY
Start: 2017-12-05 | End: 2018-08-14 | Stop reason: ALTCHOICE

## 2018-08-10 RX ORDER — LOSARTAN POTASSIUM 50 MG/1
TABLET ORAL
COMMUNITY
Start: 2018-06-16 | End: 2018-08-14 | Stop reason: SDUPTHER

## 2018-08-10 RX ORDER — QUETIAPINE FUMARATE 300 MG/1
600 TABLET, FILM COATED ORAL
COMMUNITY
Start: 2018-05-26 | End: 2018-08-14 | Stop reason: ALTCHOICE

## 2018-08-10 RX ORDER — AMLODIPINE BESYLATE 10 MG/1
TABLET ORAL
COMMUNITY
Start: 2018-07-10 | End: 2018-08-14 | Stop reason: SDUPTHER

## 2018-08-10 RX ORDER — BUSPIRONE HYDROCHLORIDE 15 MG/1
15 TABLET ORAL
COMMUNITY
Start: 2018-05-26 | End: 2018-08-14 | Stop reason: ALTCHOICE

## 2018-08-10 RX ORDER — HYDROCODONE BITARTRATE AND ACETAMINOPHEN 7.5; 325 MG/1; MG/1
TABLET ORAL
COMMUNITY
Start: 2018-06-25 | End: 2018-08-14 | Stop reason: ALTCHOICE

## 2018-08-10 RX ORDER — LEVOTHYROXINE SODIUM 0.2 MG/1
TABLET ORAL EVERY 24 HOURS
COMMUNITY
Start: 2017-10-11 | End: 2018-08-14 | Stop reason: SDUPTHER

## 2018-08-10 RX ORDER — BUSPIRONE HYDROCHLORIDE 30 MG/1
30 TABLET ORAL
COMMUNITY
End: 2018-08-14 | Stop reason: ALTCHOICE

## 2018-08-10 RX ORDER — SERTRALINE HYDROCHLORIDE 100 MG/1
TABLET, FILM COATED ORAL EVERY 24 HOURS
COMMUNITY
Start: 2018-01-25 | End: 2018-08-14 | Stop reason: ALTCHOICE

## 2018-08-10 RX ORDER — QUETIAPINE FUMARATE 300 MG/1
TABLET, FILM COATED ORAL
COMMUNITY
Start: 2017-09-29 | End: 2018-08-14 | Stop reason: ALTCHOICE

## 2018-08-10 RX ORDER — OMEPRAZOLE 40 MG/1
CAPSULE, DELAYED RELEASE ORAL EVERY 12 HOURS
COMMUNITY
End: 2018-08-14 | Stop reason: SDUPTHER

## 2018-08-10 RX ORDER — POTASSIUM CHLORIDE 20 MEQ/1
TABLET, EXTENDED RELEASE ORAL EVERY 24 HOURS
COMMUNITY
Start: 2018-06-14 | End: 2018-08-14 | Stop reason: SDUPTHER

## 2018-08-10 RX ORDER — LURASIDONE HYDROCHLORIDE 80 MG/1
TABLET, FILM COATED ORAL
Refills: 0 | COMMUNITY
Start: 2018-07-11 | End: 2018-08-14 | Stop reason: SDUPTHER

## 2018-08-10 RX ORDER — ZOLPIDEM TARTRATE 10 MG/1
TABLET ORAL
COMMUNITY
Start: 2017-12-05 | End: 2018-08-14 | Stop reason: ALTCHOICE

## 2018-08-10 RX ORDER — QUETIAPINE FUMARATE 200 MG/1
TABLET, FILM COATED ORAL
COMMUNITY
Start: 2018-07-11 | End: 2018-08-14 | Stop reason: ALTCHOICE

## 2018-08-10 RX ORDER — FUROSEMIDE 20 MG/1
TABLET ORAL EVERY 24 HOURS
COMMUNITY
Start: 2017-02-03 | End: 2018-08-14 | Stop reason: SDUPTHER

## 2018-08-10 RX ORDER — FLUCONAZOLE 150 MG/1
TABLET ORAL
Refills: 0 | COMMUNITY
Start: 2018-08-07 | End: 2018-08-14 | Stop reason: ALTCHOICE

## 2018-08-10 RX ORDER — ALBUTEROL SULFATE 90 UG/1
AEROSOL, METERED RESPIRATORY (INHALATION)
COMMUNITY
Start: 2014-11-06 | End: 2018-08-14 | Stop reason: SDUPTHER

## 2018-08-10 RX ORDER — NITROFURANTOIN 25; 75 MG/1; MG/1
CAPSULE ORAL
Refills: 0 | COMMUNITY
Start: 2018-07-20 | End: 2018-08-14 | Stop reason: ALTCHOICE

## 2018-08-10 RX ORDER — DICYCLOMINE HYDROCHLORIDE 10 MG/1
CAPSULE ORAL
COMMUNITY
Start: 2018-05-22 | End: 2018-08-14 | Stop reason: ALTCHOICE

## 2018-08-10 RX ORDER — BUPROPION HYDROCHLORIDE 150 MG/1
200 TABLET, EXTENDED RELEASE ORAL
Status: ON HOLD | COMMUNITY
End: 2018-08-31 | Stop reason: ALTCHOICE

## 2018-08-10 RX ORDER — GABAPENTIN 300 MG/1
600 CAPSULE ORAL
COMMUNITY
Start: 2018-05-26 | End: 2018-08-14 | Stop reason: ALTCHOICE

## 2018-08-10 RX ORDER — QUETIAPINE FUMARATE 100 MG/1
100 TABLET, FILM COATED ORAL
COMMUNITY
Start: 2018-07-11 | End: 2018-08-14 | Stop reason: ALTCHOICE

## 2018-08-10 RX ORDER — LURASIDONE HYDROCHLORIDE 40 MG/1
TABLET, FILM COATED ORAL
Refills: 0 | COMMUNITY
Start: 2018-07-11 | End: 2018-08-14 | Stop reason: ALTCHOICE

## 2018-08-10 NOTE — PROGRESS NOTES
This is a hospital visit is just for you to sign of on the TCM transcition of care management I have to call the pt and ask a whole bunch of questions and then update the chart and the provider signs

## 2018-08-14 ENCOUNTER — OFFICE VISIT (OUTPATIENT)
Dept: FAMILY MEDICINE CLINIC | Facility: CLINIC | Age: 48
End: 2018-08-14
Payer: MEDICARE

## 2018-08-14 ENCOUNTER — TELEPHONE (OUTPATIENT)
Dept: BEHAVIORAL/MENTAL HEALTH CLINIC | Facility: CLINIC | Age: 48
End: 2018-08-14

## 2018-08-14 ENCOUNTER — TELEPHONE (OUTPATIENT)
Dept: FAMILY MEDICINE CLINIC | Facility: CLINIC | Age: 48
End: 2018-08-14

## 2018-08-14 ENCOUNTER — HOSPITAL ENCOUNTER (OUTPATIENT)
Dept: RADIOLOGY | Facility: IMAGING CENTER | Age: 48
Discharge: HOME | End: 2018-08-14
Payer: MEDICARE

## 2018-08-14 VITALS
BODY MASS INDEX: 47.09 KG/M2 | RESPIRATION RATE: 16 BRPM | HEIGHT: 66 IN | DIASTOLIC BLOOD PRESSURE: 80 MMHG | WEIGHT: 293 LBS | HEART RATE: 74 BPM | TEMPERATURE: 96.2 F | SYSTOLIC BLOOD PRESSURE: 110 MMHG

## 2018-08-14 DIAGNOSIS — Z12.31 VISIT FOR SCREENING MAMMOGRAM: ICD-10-CM

## 2018-08-14 DIAGNOSIS — F31.9 SEVERE BIPOLAR DISORDER (HCC): ICD-10-CM

## 2018-08-14 DIAGNOSIS — K42.0 UMBILICAL HERNIA WITH OBSTRUCTION, WITHOUT GANGRENE: ICD-10-CM

## 2018-08-14 DIAGNOSIS — R11.0 NAUSEA: Primary | ICD-10-CM

## 2018-08-14 PROBLEM — N32.81 OVERACTIVE BLADDER: Status: ACTIVE | Noted: 2017-06-08

## 2018-08-14 PROBLEM — M17.0 PRIMARY OSTEOARTHRITIS OF BOTH KNEES: Status: ACTIVE | Noted: 2018-08-08

## 2018-08-14 PROCEDURE — 99214 OFFICE O/P EST MOD 30 MIN: CPT | Performed by: NURSE PRACTITIONER

## 2018-08-14 PROCEDURE — 77067 SCR MAMMO BI INCL CAD: CPT

## 2018-08-14 RX ORDER — POTASSIUM CHLORIDE 20 MEQ/1
TABLET, EXTENDED RELEASE ORAL EVERY 24 HOURS
COMMUNITY
Start: 2018-06-14 | End: 2018-08-14 | Stop reason: SDUPTHER

## 2018-08-14 RX ORDER — LEVOTHYROXINE SODIUM 0.2 MG/1
TABLET ORAL EVERY 24 HOURS
COMMUNITY
Start: 2017-10-11 | End: 2018-08-14 | Stop reason: SDUPTHER

## 2018-08-14 RX ORDER — ZOLPIDEM TARTRATE 10 MG/1
TABLET ORAL
COMMUNITY
Start: 2017-12-05 | End: 2018-08-14 | Stop reason: ALTCHOICE

## 2018-08-14 RX ORDER — SERTRALINE HYDROCHLORIDE 100 MG/1
TABLET, FILM COATED ORAL EVERY 24 HOURS
COMMUNITY
Start: 2018-01-25 | End: 2018-08-14 | Stop reason: ALTCHOICE

## 2018-08-14 RX ORDER — FUROSEMIDE 20 MG/1
TABLET ORAL EVERY 24 HOURS
COMMUNITY
Start: 2017-02-03 | End: 2018-08-14 | Stop reason: SDUPTHER

## 2018-08-14 RX ORDER — IBUPROFEN 800 MG/1
TABLET ORAL 3 TIMES DAILY
COMMUNITY
Start: 2015-04-29 | End: 2018-08-14 | Stop reason: SDUPTHER

## 2018-08-14 RX ORDER — FESOTERODINE FUMARATE 4 MG/1
TABLET, EXTENDED RELEASE ORAL EVERY 24 HOURS
COMMUNITY
End: 2018-08-14 | Stop reason: SDUPTHER

## 2018-08-14 RX ORDER — LEVOTHYROXINE SODIUM 0.05 MG/1
TABLET ORAL EVERY 24 HOURS
COMMUNITY
Start: 2017-10-11 | End: 2018-08-14 | Stop reason: SDUPTHER

## 2018-08-14 RX ORDER — ALBUTEROL SULFATE 90 UG/1
AEROSOL, METERED RESPIRATORY (INHALATION)
COMMUNITY
Start: 2014-11-06 | End: 2018-08-29

## 2018-08-14 RX ORDER — CHOLECALCIFEROL (VITAMIN D3) 125 MCG
5 CAPSULE ORAL
Refills: 0 | COMMUNITY
Start: 2018-08-13 | End: 2018-08-29

## 2018-08-14 RX ORDER — METOPROLOL TARTRATE 50 MG/1
TABLET, FILM COATED ORAL EVERY 12 HOURS
COMMUNITY
Start: 2018-05-18 | End: 2018-08-14 | Stop reason: SDUPTHER

## 2018-08-14 RX ORDER — TIZANIDINE 4 MG/1
TABLET ORAL EVERY 8 HOURS
COMMUNITY
Start: 2018-01-16 | End: 2018-08-14 | Stop reason: ALTCHOICE

## 2018-08-14 RX ORDER — GABAPENTIN 300 MG/1
CAPSULE ORAL
COMMUNITY
Start: 2017-08-23 | End: 2018-08-14 | Stop reason: ALTCHOICE

## 2018-08-14 RX ORDER — OMEPRAZOLE 40 MG/1
CAPSULE, DELAYED RELEASE ORAL EVERY 12 HOURS
COMMUNITY
End: 2018-08-14 | Stop reason: SDUPTHER

## 2018-08-14 RX ORDER — QUETIAPINE FUMARATE 300 MG/1
TABLET, FILM COATED ORAL
COMMUNITY
Start: 2017-09-29 | End: 2018-08-14 | Stop reason: ALTCHOICE

## 2018-08-14 RX ORDER — FLUCONAZOLE 150 MG/1
TABLET ORAL
COMMUNITY
Start: 2017-09-13 | End: 2018-08-14 | Stop reason: ALTCHOICE

## 2018-08-14 RX ORDER — PRAMIPEXOLE DIHYDROCHLORIDE 0.25 MG/1
TABLET ORAL
COMMUNITY
Start: 2017-12-05 | End: 2018-08-14 | Stop reason: ALTCHOICE

## 2018-08-14 NOTE — PATIENT INSTRUCTIONS
Umbilical Hernia   WHAT YOU NEED TO KNOW:   What is an umbilical hernia? An umbilical hernia is a bulge through the abdominal muscles in the area of the navel (belly button)  The hernia may contain tissue from the abdomen, part of an organ (such as the intestine), or fluid  What increases my risk for an umbilical hernia? Umbilical hernias usually happen because of a hole or a weak area in the muscles of the abdominal wall  This can happen at any age  Umbilical hernias happen more often in women than in men  You may be more likely to have a hernia if other family members have them  You may also have an increased risk if you have any of the following:  · Overweight     · Ascites, which is fluid in the abdomen     · A large growth in your abdomen     · Pregnancy now or at any time in the past     · A very long labor when delivering your baby  What are the signs and symptoms of an umbilical hernia? · The most common sign is a bulge or swelling in the area of the navel  You may be able to see the lump, or you may feel it when you gently press on your navel  · The bulge may get bigger when you bend, cough, or strain to have a bowel movement  The bulge may get smaller and hurt less when you lie down  · You may have pain or burning in your abdomen  The pain may get worse when you cough, sneeze, lift, or stand for a long time  · The skin over the bulge may be swollen and red, gray, or blue  How is an umbilical hernia diagnosed? Your healthcare provider will usually find the hernia during an exam  Your healthcare provider may check to see if the hernia can be reduced (gently pushed back into the abdomen)  You may need tests, such as x-rays of the abdomen or an ultrasound  These tests will help healthcare providers decide how to treat your hernia  How is an umbilical hernia treated? · Surgery:  Most adults with umbilical hernias will need surgery to fix their hernias   Always tell your healthcare provider if you have new or worse pain in the area of your hernia  You may need emergency surgery if a loop of intestine becomes trapped in the hernia  · Ibuprofen or acetaminophen:  These medicines decrease pain  They are available without a doctor's order  Ask your healthcare provider which medicine is right for you  Ask how much to take and how often to take it  Follow directions  These medicines can cause stomach bleeding if not taken correctly  Ibuprofen can cause kidney damage  Do not take ibuprofen if you have kidney disease, an ulcer, or allergies to aspirin  Acetaminophen can cause liver damage  Do not drink alcohol if you take acetaminophen  What are the risks of an umbilical hernia? If your hernia is not treated, the following serious problems may happen:  · Incarcerated hernia: This happens when your healthcare provider cannot push your hernia back into your abdomen  The tissue becomes stuck or trapped, which can cause serious problems  Umbilical hernias have a high risk of becoming incarcerated  If this happens, your intestines may become blocked  You may have very bad pain in your abdomen  You may also have nausea or vomiting  · Strangulated hernia:  A loop of intestine in the hernia may become pinched or strangulated  This means that the blood supply to that area of intestine is decreased or cut off  If this happens, you may feel very bad pain in your abdomen  Other signs include nausea, vomiting, or a fever  You may have constipation or blood in your bowel movements  If your intestine becomes blocked, you may not be able to pass gas or have a bowel movement  If the hernia is not treated right away, that part of your intestine may die  This is called gangrene, and it can be life-threatening  When should I contact my healthcare provider? · You have nausea or vomiting  · You cannot gently push your hernia back into your abdomen      · You are constipated or have blood in your bowel movements  · Your hernia is getting bigger  · You have questions or concerns about your condition or care  When should I seek immediate care or call 911? · You have a fever  · Your hernia is stuck outside the abdomen and is painful, swollen, or feels hard  · You completely stop having bowel movements and stop passing gas  · Your abdominal pain is bad or getting worse  CARE AGREEMENT:   You have the right to help plan your care  Learn about your health condition and how it may be treated  Discuss treatment options with your caregivers to decide what care you want to receive  You always have the right to refuse treatment  The above information is an  only  It is not intended as medical advice for individual conditions or treatments  Talk to your doctor, nurse or pharmacist before following any medical regimen to see if it is safe and effective for you  © 2017 [address] [employer] Information is for End User's use only and may not be sold, redistributed or otherwise used for commercial purposes  All illustrations and images included in CareNotes® are the copyrighted property of A D A M [employer] Inc  or [employer]  DASH Eating Plan   WHAT YOU NEED TO KNOW:   What is the DASH Eating Plan? The DASH (Dietary Approaches to Stop Hypertension) Eating Plan is designed to help prevent or lower high blood pressure  It can also help to lower LDL (bad) cholesterol and decrease your risk for heart disease  The plan is low in sodium, sugar, unhealthy fats, and total fat  It is high in potassium, calcium, magnesium, and fiber  These nutrients are added when you eat more fruits, vegetables, and whole grains  What is my sodium limit for each day? Your dietitian will tell you how much sodium is safe for you to have each day  People with high blood pressure should have no more than 1,500 to 2,300 mg of sodium in a day  A teaspoon (tsp) of salt has 2,300 mg of sodium   This may seem like a difficult goal, but small changes to the foods you eat can make a big difference  Your healthcare provider or dietitian can help you create a meal plan that follows your sodium limit  How do I limit sodium? · Read food labels  Food labels can help you choose foods that are low in sodium  The amount of sodium is listed in milligrams (mg)  The % Daily Value (DV) column tells you how much of your daily needs are met by 1 serving of the food for each nutrient listed  Choose foods that have less than 5% of the DV of sodium  These foods are considered low in sodium  Foods that have 20% or more of the DV of sodium are considered high in sodium  Avoid foods that have more than 300 mg of sodium in each serving  Choose foods that say low-sodium, reduced-sodium, or no salt added on the food label  · Avoid salt  Do not salt food at the table, and add very little salt to foods during cooking  Use herbs and spices, such as onions, garlic, and salt-free seasonings to add flavor to foods  Try lemon or lime juice or vinegar to give foods a tart flavor  Use hot peppers or a small amount of hot pepper sauce to add a spicy flavor to foods  · Ask about salt substitutes  Ask your healthcare provider if you may use salt substitutes  Some salt substitutes have ingredients that can be harmful if you have certain health conditions  · Choose foods carefully at restaurants  Meals from restaurants, especially fast food restaurants, are often high in sodium  Some restaurants have nutrition information that tells you the amount of sodium in their foods  Ask to have your food prepared with less, or no salt  What should I know about fats? · Include healthy fats  Examples are unsaturated fats and omega-3 fatty acids  Unsaturated fats are found in soybean, canola, olive, or sunflower oil, and liquid and soft tub margarines  Omega-3 fatty acids are found in fatty fish, such as salmon, tuna, mackerel, and sardines   It is also found in flaxseed oil and ground flaxseed  · Avoid unhealthy fats  Do not eat unhealthy fats, such as saturated fats and trans fats  Saturated fats are found in foods that contain fat from animals  Examples are fatty meats, whole milk, butter, cream, and other dairy foods  It is also found in shortening, stick margarine, palm oil, and coconut oil  Trans fats are found in fried foods, crackers, chips, and baked goods made with margarine or shortening  Which foods should I include? With the DASH eating plan, you need to eat a certain number of servings from each food group  This will help you get enough of certain nutrients and limit others  The amount of servings you should eat depends on how many calories you need  Your dietitian can tell you how many calories you need  The number of servings listed next to the food groups below are for people who need about 2,000 calories each day    · Grains:  6 to 8 servings (3 of these servings should be whole-grain foods)    ¨ 1 slice of whole-grain bread     ¨ 1 ounce of dry cereal    ¨ ½ cup of cooked cereal, pasta, or brown rice    · Vegetables and fruits:  4 to 5 servings of fruits and 4 to 5 servings of vegetables    ¨ 1 medium fruit    ¨ 1 cup of raw leafy vegetable    ¨ ½ cup of frozen, canned (no added salt), or chopped fresh vegetables     ¨ ½ cup of fresh, frozen, dried, or canned fruit (canned in light syrup or fruit juice)    ¨ ½ cup of vegetable or fruit juice    · Dairy:  2 to 3 servings    ¨ 1 cup of nonfat (skim) or 1% milk    ¨ 1½ ounces of fat-free or low-fat, low-sodium cheese    ¨ 6 ounces of nonfat or low-fat yogurt    · Lean meat, poultry, and fish:  6 ounces or less    [employer] (chicken, turkey) with no skin    ¨ Fish (especially fatty fish, such as salmon, fresh tuna, or mackerel)    ¨ Lean beef and pork (loin, round, extra lean hamburger)    ¨ Egg whites and egg substitutes    · Nuts, seeds, and legumes:  4 to 5 servings each week    ¨ ½ cup of cooked beans and peas    ¨ 1½ ounces of unsalted nuts    ¨ 2 tablespoons of peanut butter or seeds    · Sweets and added sugars:  5 or less each week    ¨ 1 tablespoon of sugar, jelly, or jam    ¨ ½ cup of sorbet or gelatin    ¨ 1 cup of lemonade    · Fats:  2 to 3 servings each week    ¨ 1 teaspoon of soft margarine or vegetable oil    ¨ 1 tablespoon of mayonnaise    ¨ 2 tablespoons of salad dressing  Which foods should I avoid?    · Grains:      [employer], such as doughnuts, pastries, cookies, and biscuits (high in fat and sugar)    ¨ Mixes for cornbread and biscuits, packaged foods, such as bread stuffing, rice and pasta mixes, macaroni and cheese, and instant cereals (high in sodium)    · Fruits and vegetables:      ¨ Regular, canned vegetables (high in sodium)    ¨ Sauerkraut, pickled vegetables, and other foods prepared in brine (high in sodium)    ¨ Fried vegetables or vegetables in butter or high-fat sauces    ¨ Fruit in cream or butter sauce (high in fat)    · Dairy:      ¨ Whole milk, 2% milk, and cream (high in fat)    ¨ Regular cheese and processed cheese (high in fat and sodium)    · Meats and protein foods:      ¨ Smoked or cured meat, such as corned beef, gonzales, ham, hot dogs, and sausage (high in fat and sodium)    ¨ Canned beans and canned meats or spreads, such as potted meats, sardines, anchovies, and imitation seafood (high in sodium)    ¨ Deli or lunch meats, such as bologna, ham, turkey, and roast beef (high in sodium)    ¨ High-fat meat (T-bone steak, regular hamburger, and ribs)    ¨ Whole eggs and egg yolks (high in fat)    · Other:      ¨ Seasonings made with salt, such as garlic salt, celery salt, onion salt, seasoned salt, meat tenderizers, and monosodium glutamate (MSG)    ¨ Miso soup and canned or dried soup mixes (high in sodium)    ¨ Regular soy sauce, barbecue sauce, teriyaki sauce, steak sauce, Worcestershire sauce, and most flavored vinegars (high in sodium)    ¨ Regular condiments, such as mustard, ketchup, and salad dressings (high in sodium)    ¨ Gravy and sauces, such as Tomasz or cheese sauces (high in sodium and fat)    ¨ Drinks high in sugar, such as soda or fruit drinks    [employer] foods, such as salted chips, popcorn, pretzels, pork rinds, salted crackers, and salted nuts    ¨ Frozen foods, such as dinners, entrees, vegetables with sauces, and breaded meats (high in sodium)  What other guidelines should I follow? · Maintain a healthy weight  Your risk for heart disease is higher if you are overweight  Your healthcare provider may suggest that you lose weight if you are overweight  You can lose weight by eating fewer calories and foods that have added sugars and fat  The DASH meal plan can help you do this  Decrease calories by eating smaller portions at each meal and fewer snacks  Ask your healthcare provider for more information about how to lose weight  · Exercise regularly  Regular exercise can help you reach or maintain a healthy weight  Regular exercise can also help decrease your blood pressure and improve your cholesterol levels  Get 30 minutes or more of moderate exercise each day of the week  To lose weight, get at least 60 minutes of exercise  Talk to your healthcare provider about the best exercise program for you  · Limit alcohol  Women should limit alcohol to 1 drink a day  Men should limit alcohol to 2 drinks a day  A drink of alcohol is 12 ounces of beer, 5 ounces of wine, or 1½ ounces of liquor  Where can I find more information? · [facility] and [address]  [address] [address]  [place] [address]  Phone: [phone]  Web Address: [facility] no  CARE AGREEMENT:   You have the right to help plan your care  Discuss treatment options with your caregivers to decide what care you want to receive  You always have the right to refuse treatment   The above information is an  only  It is not intended as medical advice for individual conditions or treatments  Talk to your doctor, nurse or pharmacist before following any medical regimen to see if it is safe and effective for you  © 2017 [address] Information is for End User's use only and may not be sold, redistributed or otherwise used for commercial purposes  All illustrations and images included in CareNotes® are the copyrighted property of A D A M , Inc  or [employer]

## 2018-08-14 NOTE — PROGRESS NOTES
Assessment/Plan:          Diagnoses and all orders for this visit:    Nausea  Seems to be related to mold in apartment  This is due to the improvement of symptoms when she leaves apartment   Severe bipolar disorder ([address] )  Cont with her teatment  Umbilical hernia with obstruction, without gangrene  -     Ambulatory referral to General Surgery; Future    Other orders  -     Discontinue: zolpidem (AMBIEN) 10 mg tablet; take 1 tablet (10MG)  by oral route  every day at bedtime  -     buPROPion (WELLBUTRIN SR) 150 mg 12 hr tablet; Take 150 mg by mouth  -     Discontinue: buPROPion (WELLBUTRIN XL) 150 mg 24 hr tablet; TAKE 1 TABLET BY MOUTH DAILY FOR 7 DAYS THEN TAKE 1 TABLET BY MOUTH TWICE DAILY  -     busPIRone (BUSPAR) 30 MG tablet; Take 30 mg by mouth 2 (two) times a day  -     colestipol (COLESTID) 1 g tablet; Take 1 g by mouth 2 (two) times a day    -     Discontinue: dicyclomine (BENTYL) 10 mg capsule; TAKE 1 CAPSULE BY MOUTH EVERY 6 HOURS AS NEEDED FOR ABDOMINAL PAIN  -     Discontinue: fluconazole (DIFLUCAN) 150 mg tablet; TAKE 1 TABLET (150 MG TOTAL) BY MOUTH ONCE FOR 1 DOSE  REPEAT DOSE IN 3 DAYS  -     Discontinue: fluconazole (DIFLUCAN) 150 mg tablet; take 1 tablet by oral route once  -     Discontinue: gabapentin (NEURONTIN) 300 mg capsule;  Take 600 mg by mouth  -     Discontinue: HYDROcodone-acetaminophen (NORCO) 7 5-325 mg per tablet; TAKE 1 TO 2 TABLETS EVERY 6 HOURS AS NEEDED FOR SEVERE PAIN; MAX 8 PER DAY  -     Discontinue: PROCTO-MED HC 2 5 % rectal cream; 1 APPLICATON TWICE A DAY FOR 14 DAYS WITH APPLICATOR FOR HEMORRHOIDS  -     lidocaine (LIDOTREX) 2 % gel; APPLY TO TIP OF TONGUE EVERY 2HRS AS NEEDED  -     Discontinue: LATUDA 40 MG tablet; TAKE 1 TABLET BY MOUTH DAILY FOR 7 DAYS(THEN INCREASE TO LATUDA 60)  -     Discontinue: LATUDA 80 MG tablet; TAKE 1 TABLET BY MOUTH IN THE MORNING WITH FOOD FOR 30 DAYS  -     Discontinue: nitrofurantoin (MACROBID) 100 mg capsule; TAKE 1 CAPSULE (100 MG TOTAL) BY MOUTH 2 (TWO) TIMES A DAY FOR 5 DAYS  -     Discontinue: pramipexole (MIRAPEX) 0 25 mg tablet; take 1 tablet by oral route twice a day  -     Discontinue: hydrocortisone (PROCTO-MED HC) 2 5 % rectal cream; 1 APPLICATON TWICE A DAY FOR 14 DAYS WITH APPLICATOR FOR HEMORRHOIDS  -     Discontinue: QUEtiapine (SEROquel) 100 mg tablet; Take 100 mg by mouth  -     Discontinue: QUEtiapine (SEROquel) 200 mg tablet; TAKE 1 TABLET BY MOUTH AT BEDTIME FOR 7 DAYS  -     Discontinue: QUEtiapine (SEROquel) 300 mg tablet; Take 600 mg by mouth  -     Discontinue: QUEtiapine (SEROQUEL) 300 mg tablet; take 2 tablet by oral route  every day  -     Discontinue: sertraline (ZOLOFT) 50 mg tablet; TAKE 1 TABLET BY MOUTH DAILY FOR 7 DAYS  -     Discontinue: sertraline (ZOLOFT) 100 mg tablet; every 24 hours  -     Discontinue: sertraline (ZOLOFT) 50 mg tablet; TAKE 1 TABLET BY MOUTH DAILY FOR 7 DAYS  -     Discontinue: amLODIPine (NORVASC) 10 mg tablet; TAKE 1 TABLET (10 MG TOTAL) BY MOUTH DAILY  -     Discontinue: busPIRone (BUSPAR) 15 mg tablet; Take 15 mg by mouth  -     Discontinue: busPIRone (BUSPAR) 30 MG tablet; Take 30 mg by mouth  -     Discontinue: furosemide (LASIX) 20 mg tablet; every 24 hours  -     Discontinue: ibuprofen (MOTRIN) 800 mg tablet; 3 (three) times a day  -     Discontinue: Lurasidone HCl (LATUDA) 60 MG TABS; TAKE 1 TABLET BY MOUTH DAILY FOR 7 DAYS  -     Discontinue: losartan (COZAAR) 50 mg tablet;   -     Discontinue: metoprolol tartrate (LOPRESSOR) 50 mg tablet; Take 50 mg by mouth  -     Discontinue: potassium chloride (K-DUR,KLOR-CON) 20 mEq tablet; every 24 hours  -     Discontinue: omeprazole (PRILOSEC) 40 MG capsule;  Every 12 hours  -     Discontinue: levothyroxine (SYNTHROID) 200 mcg tablet; every 24 hours  -     Discontinue: levothyroxine (SYNTHROID) 50 mcg tablet; every 24 hours  -     Discontinue: tiZANidine (ZANAFLEX) 4 mg tablet; TAKE 1 TABLET (4 MG TOTAL) BY MOUTH 3 (THREE) TIMES A DAY AS NEEDED FOR MUSCLE SPASMS  -     Discontinue: Fesoterodine Fumarate ER (TOVIAZ) 4 MG TB24; every 24 hours  -     Discontinue: albuterol (VENTOLIN HFA) 90 mcg/act inhaler; inhale 2 puff by inhalation route  every 4 - 6 hours as needed  -     Discontinue: BusPIRone HCl (BUSPAR PO); Take 30 mg by mouth Every 12 hours  -     Discontinue: Lidocaine HCl (LIDOCAINE VISCOUS MT); APPLY TO TIP OF TONGUE EVERY 2HRS AS NEEDED  -     Melatonin 5 MG TABS; Take 5 mg by mouth daily at bedtime as needed  -     Discontinue: zolpidem (AMBIEN) 10 mg tablet; take 1 tablet (10MG)  by oral route  every day at bedtime  -     Discontinue: fluconazole (DIFLUCAN) 150 mg tablet; take 1 tablet by oral route once  -     Discontinue: furosemide (LASIX) 20 mg tablet; every 24 hours  -     Discontinue: gabapentin (NEURONTIN) 300 mg capsule; take 2 capsules in am by oral route 2 capsules at bed time daily  -     Discontinue: ibuprofen (MOTRIN) 800 mg tablet; 3 (three) times a day  -     Discontinue: metoprolol tartrate (LOPRESSOR) 50 mg tablet; Every 12 hours  -     Discontinue: potassium chloride (K-DUR,KLOR-CON) 20 mEq tablet; every 24 hours  -     Discontinue: pramipexole (MIRAPEX) 0 25 mg tablet; take 1 tablet by oral route twice a day  -     Discontinue: omeprazole (PRILOSEC) 40 MG capsule;  Every 12 hours  -     Discontinue: QUEtiapine (SEROQUEL) 300 mg tablet; take 2 tablet by oral route  every day  -     Discontinue: sertraline (ZOLOFT) 100 mg tablet; every 24 hours  -     Discontinue: levothyroxine (SYNTHROID) 200 mcg tablet; every 24 hours  -     Discontinue: levothyroxine (SYNTHROID) 50 mcg tablet; every 24 hours  -     Discontinue: tiZANidine (ZANAFLEX) 4 mg tablet; every 8 (eight) hours  -     Discontinue: Fesoterodine Fumarate ER (TOVIAZ) 4 MG TB24; every 24 hours  -     albuterol (VENTOLIN HFA) 90 mcg/act inhaler; inhale 2 puff by inhalation route  every 4 - 6 hours as needed          Subjective:      Patient ID: Uhsa Mendenhall is a 56 y o  female  HPI    Patient presents today for ongoing nausea  She is having the nausea mainly when she is at home  She states since she was discharged from the hospital 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 she notices the nausea and it stays persistent  She is having a mold issue  There is an issue with her shower and the ceiling down  When she goes to other places, she notes that after several hours there this nausea goes away and it does not return  It will recur as soon as she goes back into her apartment  We also discussed a follow-up from recent hospitalization  [facility]  She did have a bad effect from a illicit drugs which was elliciet  She did have a suicide attempt  She is now on currently under treatment  The following portions of the patient's history were reviewed and updated as appropriate: allergies, current medications, past family history, past medical history, past social history, past surgical history and problem list     Review of Systems   Constitutional: Negative for activity change, appetite change, fatigue and fever  HENT: Negative for congestion, ear pain, sinus pain and sinus pressure  Respiratory: Negative for chest tightness and shortness of breath  Cardiovascular: Negative for chest pain and palpitations  Gastrointestinal: Positive for abdominal pain and nausea  Tender on the umbilical hernia    Genitourinary: Negative for dysuria  Psychiatric/Behavioral: The patient is nervous/anxious  Had a suicide attempt with a blade and cutting and with trying to hang herself             Objective:  Vitals:    08/14/18 0823   BP: 110/80   BP Location: Left arm   Patient Position: Sitting   Cuff Size: Large   Pulse: 74   Resp: 16   Temp: (!) 96 2 °F (35 7 °C)   TempSrc: Temporal   Weight: (!) 145 kg (318 lb 14 4 oz)   Height: 5' 5 75" (1 67 m)      Physical Exam   Constitutional: She is oriented to person, place, and time  She appears well-developed and well-nourished  Abdominal: Soft  Bowel sounds are normal  There is tenderness  A hernia is present  Musculoskeletal: She exhibits edema  Neurological: She is alert and oriented to person, place, and time  Psychiatric: Her speech is normal  Her mood appears anxious  She exhibits a depressed mood  She expresses suicidal (was suicidal  ) ideation

## 2018-08-14 NOTE — TELEPHONE ENCOUNTER
Patient called she said that she is taking lasix 20 mg she wants to know if it can be increased patient can be reached at [phone]   Ty

## 2018-08-14 NOTE — TELEPHONE ENCOUNTER
We can consider but then we have to monitor potassium   Want her to try the diet controls and reading labels first    If not helping then would consider

## 2018-08-27 DIAGNOSIS — E03.9 HYPOTHYROIDISM, UNSPECIFIED TYPE: ICD-10-CM

## 2018-08-27 DIAGNOSIS — I10 ESSENTIAL HYPERTENSION, BENIGN: Primary | ICD-10-CM

## 2018-08-27 NOTE — TELEPHONE ENCOUNTER
Pt is calling because she needs a lab rx for her potasium level she has been on the medication for 8 weeks   Thank you please call patient when done so she can go over to the lab

## 2018-08-28 RX ORDER — LEVOTHYROXINE SODIUM 0.05 MG/1
50 TABLET ORAL DAILY
Qty: 90 TABLET | Refills: 3 | Status: SHIPPED | OUTPATIENT
Start: 2018-08-28 | End: 2018-09-19 | Stop reason: HOSPADM

## 2018-08-28 RX ORDER — METOPROLOL TARTRATE 50 MG/1
50 TABLET, FILM COATED ORAL EVERY 12 HOURS SCHEDULED
Qty: 180 TABLET | Refills: 3 | Status: SHIPPED | OUTPATIENT
Start: 2018-08-28 | End: 2019-05-30 | Stop reason: HOSPADM

## 2018-08-28 RX ORDER — ESTRADIOL 1 MG/1
1 TABLET ORAL DAILY
Refills: 4 | COMMUNITY
Start: 2018-08-16 | End: 2018-08-29

## 2018-08-29 RX ORDER — TRAZODONE HYDROCHLORIDE 100 MG/1
200 TABLET ORAL
Status: ON HOLD | COMMUNITY
End: 2018-09-19

## 2018-08-29 RX ORDER — DICYCLOMINE HYDROCHLORIDE 10 MG/1
10 CAPSULE ORAL 4 TIMES DAILY PRN
Status: ON HOLD | COMMUNITY
End: 2018-08-31 | Stop reason: ALTCHOICE

## 2018-08-29 RX ORDER — QUETIAPINE FUMARATE 300 MG/1
600 TABLET, FILM COATED ORAL
COMMUNITY
End: 2018-08-29

## 2018-08-29 RX ORDER — GABAPENTIN 600 MG/1
600 TABLET ORAL
COMMUNITY
End: 2018-08-29

## 2018-08-29 RX ORDER — HYDROCODONE BITARTRATE AND ACETAMINOPHEN 7.5; 325 MG/1; MG/1
1 TABLET ORAL EVERY 6 HOURS PRN
COMMUNITY
End: 2018-08-29

## 2018-08-29 NOTE — PRE-PROCEDURE INSTRUCTIONS
Pre-Surgery Instructions:   Medication Instructions    buPROPion (WELLBUTRIN SR) 150 mg 12 hr tablet Instructed patient per Anesthesia Guidelines   busPIRone (BUSPAR) 30 MG tablet Instructed patient per Anesthesia Guidelines   dicyclomine (BENTYL) 10 mg capsule Instructed patient per Anesthesia Guidelines   losartan (COZAAR) 50 mg tablet Instructed patient per Anesthesia Guidelines   metoprolol tartrate (LOPRESSOR) 50 mg tablet Instructed patient per Anesthesia Guidelines   omeprazole (PriLOSEC) 40 MG capsule Instructed patient per Anesthesia Guidelines   [DISCONTINUED] gabapentin (NEURONTIN) 600 MG tablet Instructed patient per Anesthesia Guidelines   [DISCONTINUED] HYDROcodone-acetaminophen (NORCO) 7 5-325 mg per tablet Instructed patient per Anesthesia Guidelines   [DISCONTINUED] QUEtiapine (SEROquel) 300 mg tablet Instructed patient per Anesthesia Guidelines   [DISCONTINUED] Zolpidem Tartrate (AMBIEN PO) Instructed patient per Anesthesia Guidelines

## 2018-08-30 ENCOUNTER — OFFICE VISIT (OUTPATIENT)
Dept: SURGERY | Facility: CLINIC | Age: 48
End: 2018-08-30
Payer: MEDICARE

## 2018-08-30 ENCOUNTER — ANESTHESIA EVENT (OUTPATIENT)
Dept: PERIOP | Facility: HOSPITAL | Age: 48
End: 2018-08-30
Payer: MEDICARE

## 2018-08-30 VITALS
WEIGHT: 293 LBS | HEIGHT: 67 IN | SYSTOLIC BLOOD PRESSURE: 146 MMHG | BODY MASS INDEX: 45.99 KG/M2 | HEART RATE: 69 BPM | TEMPERATURE: 97.8 F | DIASTOLIC BLOOD PRESSURE: 84 MMHG | RESPIRATION RATE: 16 BRPM

## 2018-08-30 DIAGNOSIS — K42.0 UMBILICAL HERNIA WITH OBSTRUCTION, WITHOUT GANGRENE: ICD-10-CM

## 2018-08-30 PROCEDURE — 99203 OFFICE O/P NEW LOW 30 MIN: CPT | Performed by: FAMILY MEDICINE

## 2018-08-30 RX ORDER — BUPROPION HYDROCHLORIDE 200 MG/1
200 TABLET, EXTENDED RELEASE ORAL 2 TIMES DAILY
Refills: 0 | COMMUNITY
Start: 2018-08-29 | End: 2018-09-19 | Stop reason: HOSPADM

## 2018-08-30 RX ORDER — METRONIDAZOLE 7.5 MG/G
GEL VAGINAL
Refills: 0 | Status: ON HOLD | COMMUNITY
Start: 2018-08-29 | End: 2018-09-11 | Stop reason: ALTCHOICE

## 2018-08-30 NOTE — ANESTHESIA PREPROCEDURE EVALUATION
Review of Systems/Medical History  Patient summary reviewed  Chart reviewed      Cardiovascular  Negative cardio ROS Exercise tolerance (METS): >4,  Hypertension controlled, Orthopnea, MARTINEZ,    Pulmonary  Smoker cigarette smoker  , Tobacco cessation counseling given Cumulative Pack Years: 21, Asthma , well controlled/ stable , Shortness of breath, Sleep apnea CPAP,        GI/Hepatic    GERD well controlled, Esophageal disease daniel esophagus,        Negative  ROS        Endo/Other  History of thyroid disease , hypothyroidism,   Obesity  super morbid obesity   GYN  , Prior pregnancy/OB history : 1 Parity: 0,   Hysterectomy,        Hematology  Negative hematology ROS      Musculoskeletal  Negative musculoskeletal ROS   Arthritis     Neurology  Negative neurology ROS   Headaches,    Psychology   Anxiety, Depression , bipolar disorder and being treated for depression,              Physical Exam    Airway    Mallampati score: II  TM Distance: >3 FB  Neck ROM: limited     Dental       Cardiovascular  Comment: Negative ROS, Cardiovascular exam normal    Pulmonary  Pulmonary exam normal     Other Findings  Fixed and  Upper and lower      Anesthesia Plan  ASA Score- 3     Anesthesia Type- IV sedation with anesthesia with ASA Monitors  Additional Monitors:   Airway Plan:     Comment: Pt has MAG  Plan Factors-Patient not instructed to abstain from smoking on day of procedure  Patient did not smoke on day of surgery  Induction- intravenous  Postoperative Plan- Plan for postoperative opioid use  Informed Consent- Anesthetic plan and risks discussed with patient and legal guardian  I personally reviewed this patient with the CRNA  Discussed and agreed on the Anesthesia Plan with the CRNA  Darcy Najera

## 2018-08-30 NOTE — PROGRESS NOTES
Assessment/Plan:   Sandra Roy is a 56 y  o female who is here for The encounter diagnosis was Umbilical hernia with obstruction, without gangrene  Hernia has been present for 1 month  Plan:  - Postpone surgical repair of Umbilical hernia until weight is <275   - CT on 8/13/2018 shows 3 2 x 3 4 cm umbilical hernia containing fat  Minimal internal stranding which is consistent with clinical findings  - patient agreed to follow up in 4 months  Preoperative Clearance: None      - Patient has been instructed to avoid herbs or non-directed vitamins the week prior to surgery to ensure no drug interactions with perioperative surgical and anesthetic medications  - Patient should continue beta-blocker medication up through and including the day of surgery but hold any other hypertensive medications, including diuretics, unless instructed by PCP or anesthesia  - Patient should continue his statin medication up through and including the day of surgery   - Hold metformin , If on this medication, the morning of surgery and do not resume until 48 hours AFTER surgery to avoid risk of lactic acidosis  Do not resume if eGFR is < 30  - Insulin Management:If on Insulin, patient advised to call PCP for explicit instructions  In general, will need to take one-half normal dose am of surgery but pt advised to consult PCP before making any changes  - Patient has been instructed to avoid aspirin containing medications or non-steroidal anti-inflammatory drugs for SEVEN days preceding surgery  Imaging: CTAP w/ IV contrast  - imaging done at [facility] and unable to visualize  - patient brought report as stated in plan       _____________________________________________      HPI:  Sandra Roy is a 56 y  o female who was referred for evaluation of Abdominal Pain (umbilical hernia) and Patient Education (Given to patient)        Currently complaining of  initial Umbilical Hernia  worse with bending, coughing,     nausea and no vomiting,     regular bowel movement  Duration of pain or symptoms:  no pain       Prior abdominal surgery:   Yes      No results found for: WBC, HGB, HCT, MCV, PLT  No results found for: NA, K, CL, CO2, ANIONGAP, BUN, CREATININE, GLUCOSE, GLUF, CALCIUM, CORRECTEDCA, AST, ALT, ALKPHOS, PROT, ALBUMIN, BILITOT, EGFR  No results found for: INR, PROTIME        ROS:  General ROS: negative  negative for - chills, fatigue, fever or night sweats, weight loss  Respiratory ROS: no cough, shortness of breath, or wheezing  Cardiovascular ROS: no chest pain or dyspnea on exertion  Genito-Urinary ROS: no dysuria, trouble voiding, or hematuria  Musculoskeletal ROS: negative for - gait disturbance, joint pain or muscle pain  Neurological ROS: no TIA or stroke symptoms  Abdominal ROS: see HPI  GI ROS: see HPI  Skin ROS: no new rashes or lesions   Lymphatic ROS: no new adenopathy noted by pt     GYN ROS: see HPI, no new GYN history or bleeding noted  Psy ROS: no new mental or behavioral disturbances       Patient Active Problem List   Diagnosis    Asthma    Yan's esophagus determined by biopsy    Benign essential hypertension    Severe bipolar disorder (HCC)    Chronic low back pain    DDD (degenerative disc disease), lumbar    Depression    Dysphagia    Edema    Family history of renal cancer    Headache    Hypothyroidism    Microhematuria    Morbid obesity ([address] )    MAG (obstructive sleep apnea)    Precordial pain    Spondylosis of lumbosacral joint without myelopathy    Stress incontinence in female    Abdominal pain    Hypertension    Overactive bladder    Primary osteoarthritis of both knees    Urinary incontinence         Allergies: No active allergies    Meds:  Current Outpatient Prescriptions:     amLODIPine (NORVASC) 10 mg tablet, Take 1 tablet (10 mg total) by mouth daily (Patient taking differently: Take 10 mg by mouth daily at bedtime  ), Disp: 30 tablet, Rfl: 5    buPROPion (WELLBUTRIN SR) 200 MG 12 hr tablet, Take 200 mg by mouth 2 (two) times a day, Disp: , Rfl: 0    busPIRone (BUSPAR) 30 MG tablet, Take 15 mg by mouth 3 (three) times a day  , Disp: , Rfl: 0    colestipol (COLESTID) 1 g tablet, Take 1 g by mouth 2 (two) times a day  , Disp: , Rfl:     dicyclomine (BENTYL) 10 mg capsule, Take 10 mg by mouth 4 (four) times a day as needed, Disp: , Rfl:     furosemide (LASIX) 20 mg tablet, Take 20 mg by mouth daily  , Disp: , Rfl:     levothyroxine 50 mcg tablet, Take 1 tablet (50 mcg total) by mouth daily (Patient taking differently: Take 250 mcg by mouth daily  ), Disp: 90 tablet, Rfl: 3    lidocaine (LIDOTREX) 2 % gel, APPLY TO TIP OF TONGUE EVERY 2HRS AS NEEDED, Disp: , Rfl:     losartan (COZAAR) 50 mg tablet, Take 50 mg by mouth daily  , Disp: , Rfl: 0    lurasidone (LATUDA) 80 mg tablet, Take 100 mg by mouth daily with breakfast  , Disp: , Rfl:     metoprolol tartrate (LOPRESSOR) 50 mg tablet, Take 1 tablet (50 mg total) by mouth every 12 (twelve) hours, Disp: 180 tablet, Rfl: 3    metroNIDAZOLE (METROGEL) 0 75 % vaginal gel, INSERT 1 APPLICATOR(S)FUL EVERY DAY BY VAGINAL ROUTE FOR 5 DAYS , Disp: , Rfl: 0    omeprazole (PriLOSEC) 40 MG capsule, Take 40 mg by mouth 2 (two) times a day  , Disp: , Rfl:     potassium chloride (K-DUR,KLOR-CON) 20 mEq tablet, Take 20 mEq by mouth daily  , Disp: , Rfl: 0    tiZANidine (ZANAFLEX) 4 mg tablet, Take 1 tablet (4 mg total) by mouth 3 (three) times a day as needed for muscle spasms, Disp: 30 tablet, Rfl: 12    traZODone (DESYREL) 100 mg tablet, Take 200 mg by mouth daily at bedtime, Disp: , Rfl:     buPROPion (WELLBUTRIN SR) 150 mg 12 hr tablet, Take 200 mg by mouth  , Disp: , Rfl:     PMH:  Past Medical History:   Diagnosis Date    Anxiety     Arthritis     oa cassandra knees    Asthma     good control    Yan's esophagus     Bipolar affective disorder (HCC)     Cellulitis of abdominal wall     Chronic pain disorder     lumbar    CPAP (continuous positive airway pressure) dependence     Degenerative disc disease at L5-S1 level     Depression 09/16/2008    Disease of thyroid gland     hypo    MARTINEZ (dyspnea on exertion)     Drug overdose 10/28/2008    suicide attempt    Dyspepsia     chronic    Dysphagia     Dysphagia     Dyspnea     Edema     GERD (gastroesophageal reflux disease)     High blood pressure     Migraines     Overactive bladder     Rectal bleeding     Sjogren's disease (HCC)     Sleep apnea     Stress incontinence     Suicidal ideations     Umbilical hernia        PSH:  Past Surgical History:   Procedure Laterality Date    BREAST LUMPECTOMY Right     reduction    CARPAL TUNNEL RELEASE      left    CHOLECYSTECTOMY      5/2003    COLONOSCOPY      01/12/2009    DILATION AND CURETTAGE OF UTERUS      ELBOW SURGERY Left     ESOPHAGOGASTRODUODENOSCOPY      HYSTERECTOMY      laporoscopic, partial    KNEE ARTHROSCOPY      right    LAPAROSCOPIC HYSTERECTOMY      REPAIR LACERATION Left     left hand-5/18/2009    TONSILECTOMY AND ADNOIDECTOMY      TONSILLECTOMY      VEIN LIGATION Bilateral        Family History   Problem Relation Age of Onset    Kidney cancer Mother     Diabetes Mother     Colon cancer Family         reports that she quit smoking about 4 years ago  Her smoking use included Cigarettes  She has never used smokeless tobacco  She reports that she does not drink alcohol or use drugs  PHYSICAL EXAM  General Appearance:    Alert, cooperative, no distress, Restin comfortably on the examining table     Head:    Normocephalic without obvious abnormality   Eyes:    PERRL, conjunctiva/corneas clear, EOM's intact        Neck:   Supple, no adenopathy, no JVD   Back:     Symmetric, no spinal or CVA tenderness   Lungs:     Clear to auscultation bilaterally, no wheezing or rhonchi   Heart:    Regular rate and rhythm, S1 and S2 normal, no murmur   Abdomen:      abdomen is soft without significant tenderness, masses, organomegaly or guarding Bowel sounds are normal     umbilical hernia  The hernia is, easily reducible,, tender,, The fascial edges are easily palpable and the defect measures 2 cm in size  Extremities:   Extremities normal  No clubbing, cyanosis or edema   Psych:   Normal Affect, AOx3  Neurologic:  Skin:   CNII-XII intact  Strength symmetric, speech intact    Warm, dry, intact, no visible rashes or lesions                   Informed consent for procedure was personally discussed, reviewed, and signed by Dr Jessica Ring  Discussion by Dr Jessica Ring was carried out regarding risks, benefits, and alternatives with the patient  Risks include but are not limited to:  bleeding, infection, and delayed wound healing or an open wound, pulmonary embolus, leaks from bowel or bile ducts or other viscus, transfusions, death  Discussed in further detail the more common complications and their rates of occurrence   was used if necessary  Patient expressed understanding of the issues discussed and wished/consented to proceed  All questions were answered by Dr Jessica Ring  Discussion performed between patient and the provider signing below  Signature:   Clara Lincoln MD    Date: 8/30/2018 Time: 2:15 PM       Some portions of this record may have been generated with voice recognition software  There may be translation, syntax,  or grammatical errors  Occasional wrong word or "sound-a-like" substitutions may have occurred due to the inherent limitations of the voice recognition software  Read the chart carefully and recognize, using context, where substitutions may have occurred  If you have any questions, please contact the dictating provider for clarification or correction, as needed  This encounter has been coded by a non-certified coder

## 2018-08-31 ENCOUNTER — HOSPITAL ENCOUNTER (OUTPATIENT)
Facility: HOSPITAL | Age: 48
Setting detail: OUTPATIENT SURGERY
Discharge: HOME | End: 2018-08-31
Attending: COLON & RECTAL SURGERY | Admitting: COLON & RECTAL SURGERY
Payer: MEDICARE

## 2018-08-31 ENCOUNTER — ANESTHESIA (OUTPATIENT)
Dept: PERIOP | Facility: HOSPITAL | Age: 48
End: 2018-08-31
Payer: MEDICARE

## 2018-08-31 VITALS
HEART RATE: 68 BPM | TEMPERATURE: 96.7 F | RESPIRATION RATE: 19 BRPM | WEIGHT: 293 LBS | DIASTOLIC BLOOD PRESSURE: 71 MMHG | OXYGEN SATURATION: 96 % | BODY MASS INDEX: 45.99 KG/M2 | HEIGHT: 67 IN | SYSTOLIC BLOOD PRESSURE: 142 MMHG

## 2018-08-31 DIAGNOSIS — K60.2 ANAL FISSURE: Primary | ICD-10-CM

## 2018-08-31 RX ORDER — FENTANYL CITRATE 50 UG/ML
INJECTION, SOLUTION INTRAMUSCULAR; INTRAVENOUS AS NEEDED
Status: DISCONTINUED | OUTPATIENT
Start: 2018-08-31 | End: 2018-08-31 | Stop reason: SURG

## 2018-08-31 RX ORDER — FENTANYL CITRATE/PF 50 MCG/ML
12.5 SYRINGE (ML) INJECTION
Status: DISCONTINUED | OUTPATIENT
Start: 2018-08-31 | End: 2018-08-31 | Stop reason: HOSPADM

## 2018-08-31 RX ORDER — OXYCODONE HYDROCHLORIDE AND ACETAMINOPHEN 5; 325 MG/1; MG/1
1 TABLET ORAL EVERY 6 HOURS PRN
Qty: 30 TABLET | Refills: 0 | Status: ON HOLD | OUTPATIENT
Start: 2018-08-31 | End: 2018-09-11 | Stop reason: ALTCHOICE

## 2018-08-31 RX ORDER — DIPHENHYDRAMINE HYDROCHLORIDE 50 MG/ML
12.5 INJECTION INTRAMUSCULAR; INTRAVENOUS ONCE
Status: DISCONTINUED | OUTPATIENT
Start: 2018-08-31 | End: 2018-08-31 | Stop reason: HOSPADM

## 2018-08-31 RX ORDER — SODIUM CHLORIDE, SODIUM LACTATE, POTASSIUM CHLORIDE, CALCIUM CHLORIDE 600; 310; 30; 20 MG/100ML; MG/100ML; MG/100ML; MG/100ML
75 INJECTION, SOLUTION INTRAVENOUS CONTINUOUS
Status: DISCONTINUED | OUTPATIENT
Start: 2018-08-31 | End: 2018-08-31 | Stop reason: HOSPADM

## 2018-08-31 RX ORDER — PROPOFOL 10 MG/ML
INJECTION, EMULSION INTRAVENOUS CONTINUOUS PRN
Status: DISCONTINUED | OUTPATIENT
Start: 2018-08-31 | End: 2018-08-31 | Stop reason: SURG

## 2018-08-31 RX ORDER — HYDROCODONE BITARTRATE AND ACETAMINOPHEN 5; 325 MG/1; MG/1
TABLET ORAL
Status: DISCONTINUED
Start: 2018-08-31 | End: 2018-08-31 | Stop reason: HOSPADM

## 2018-08-31 RX ORDER — MIDAZOLAM HYDROCHLORIDE 1 MG/ML
INJECTION INTRAMUSCULAR; INTRAVENOUS AS NEEDED
Status: DISCONTINUED | OUTPATIENT
Start: 2018-08-31 | End: 2018-08-31 | Stop reason: SURG

## 2018-08-31 RX ORDER — ESTRADIOL 1 MG/1
1 TABLET ORAL DAILY
COMMUNITY
End: 2020-06-19 | Stop reason: SDUPTHER

## 2018-08-31 RX ORDER — HYDROCODONE BITARTRATE AND ACETAMINOPHEN 5; 325 MG/1; MG/1
1 TABLET ORAL EVERY 6 HOURS PRN
Status: DISCONTINUED | OUTPATIENT
Start: 2018-08-31 | End: 2018-08-31 | Stop reason: HOSPADM

## 2018-08-31 RX ORDER — FENTANYL CITRATE/PF 50 MCG/ML
25 SYRINGE (ML) INJECTION
Status: DISCONTINUED | OUTPATIENT
Start: 2018-08-31 | End: 2018-08-31 | Stop reason: HOSPADM

## 2018-08-31 RX ORDER — OXYCODONE HYDROCHLORIDE AND ACETAMINOPHEN 5; 325 MG/1; MG/1
1 TABLET ORAL EVERY 4 HOURS PRN
Status: DISCONTINUED | OUTPATIENT
Start: 2018-08-31 | End: 2018-08-31 | Stop reason: HOSPADM

## 2018-08-31 RX ORDER — DEXAMETHASONE SODIUM PHOSPHATE 4 MG/ML
4 INJECTION, SOLUTION INTRA-ARTICULAR; INTRALESIONAL; INTRAMUSCULAR; INTRAVENOUS; SOFT TISSUE ONCE AS NEEDED
Status: DISCONTINUED | OUTPATIENT
Start: 2018-08-31 | End: 2018-08-31 | Stop reason: HOSPADM

## 2018-08-31 RX ADMIN — MIDAZOLAM HYDROCHLORIDE 2 MG: 1 INJECTION, SOLUTION INTRAMUSCULAR; INTRAVENOUS at 12:45

## 2018-08-31 RX ADMIN — PROPOFOL 140 MCG/KG/MIN: 10 INJECTION, EMULSION INTRAVENOUS at 12:50

## 2018-08-31 RX ADMIN — SODIUM CHLORIDE, POTASSIUM CHLORIDE, SODIUM LACTATE AND CALCIUM CHLORIDE 75 ML/HR: 600; 310; 30; 20 INJECTION, SOLUTION INTRAVENOUS at 12:01

## 2018-08-31 RX ADMIN — FENTANYL CITRATE 100 MCG: 50 INJECTION INTRAMUSCULAR; INTRAVENOUS at 12:45

## 2018-08-31 NOTE — DISCHARGE SUMMARY
[facility]                                                                                 DISCHARGE SUMMARY        PATIENT NAME: Jhon Hagen    :  1970  MRN: [id number]  Pt Location:  OR ROOM 07    DISCHARGE DATE: 2018    PROCEDURE: Procedure(s) (LRB):  EUA, LEFT PARTIAL INTERNAL SPHINCTEROTOMY (N/A)    Anesthesia Type: IV Sedation with Anesthesia    Complications: None    Specimen(s):  * No specimens in log *    HOSPITAL COURSE: The patient was admitted for elective procedure  The procedure was uncomplicated and the the patient was discharged home post procedure          SIGNATURE: Ulisses Tillman MD  DATE: 2018  TIME: 1:04 PM

## 2018-08-31 NOTE — OP NOTE
OPERATIVE REPORT  PATIENT NAME: Sima Becerril    :  1970  MRN: [id number]  Pt Location:  OR ROOM 07    SURGERY DATE: 2018    Indications:   Posterior anal fissure    Procedure:   Left partial internal sphincterotomy    Findings:   Hypertrophic internal sphincter    Anesthesia Type: IV Sedation with Anesthesia    Complications: None      Procedure: The patient was brought into the operating room and laid in a prone kofi-knife position  Sedation was administered by anesthesia  The buttocks were retracted with cloth tape  The area was prepped and draped  The patient was given an anal block with lidocaine Marcaine epinephrine and bicarbonate  I placed a Cobos retractors  There was a very small posterior fissure  There was a tight band of the internal sphincter muscle  I placed 11 blade in the intersphincteric groove turning this 90° and incising the internal sphincter for the length of the fissure  Pressure was held for hemostasis the patient was then awoke and transported to recovery room in stable condition  Specimen(s):  * No specimens in log *      Attestation: I was present for the entire procedure        Patient Disposition: PACU      SIGNATURE: Digna Phillip MD  DATE: 2018  TIME: 1:01 PM

## 2018-08-31 NOTE — ANESTHESIA POSTPROCEDURE EVALUATION
Post-Op Assessment Note      CV Status:  Stable    Mental Status:  Alert and awake    Hydration Status:  Euvolemic    PONV Controlled:  Controlled    Airway Patency:  Patent    Post Op Vitals Reviewed: Yes          Staff: Anesthesiologist, CRNA           /61 (08/31/18 1317)    Temp      Pulse 71 (08/31/18 1317)   Resp (!) 27 (08/31/18 1317)    SpO2 92 % (08/31/18 1317)

## 2018-09-04 ENCOUNTER — OFFICE VISIT (OUTPATIENT)
Dept: FAMILY MEDICINE CLINIC | Facility: CLINIC | Age: 48
End: 2018-09-04
Payer: MEDICARE

## 2018-09-04 VITALS
DIASTOLIC BLOOD PRESSURE: 80 MMHG | WEIGHT: 293 LBS | SYSTOLIC BLOOD PRESSURE: 150 MMHG | BODY MASS INDEX: 47.93 KG/M2 | RESPIRATION RATE: 18 BRPM | HEART RATE: 60 BPM

## 2018-09-04 DIAGNOSIS — R51.9 NONINTRACTABLE HEADACHE, UNSPECIFIED CHRONICITY PATTERN, UNSPECIFIED HEADACHE TYPE: Primary | ICD-10-CM

## 2018-09-04 DIAGNOSIS — E87.6 HYPOKALEMIA: ICD-10-CM

## 2018-09-04 PROCEDURE — 99213 OFFICE O/P EST LOW 20 MIN: CPT | Performed by: NURSE PRACTITIONER

## 2018-09-04 RX ORDER — TOPIRAMATE 25 MG/1
25 TABLET ORAL 2 TIMES DAILY
Qty: 60 TABLET | Refills: 0 | Status: SHIPPED | OUTPATIENT
Start: 2018-09-04 | End: 2018-09-04 | Stop reason: SDUPTHER

## 2018-09-04 RX ORDER — BUSPIRONE HYDROCHLORIDE 15 MG/1
15 TABLET ORAL 3 TIMES DAILY
Refills: 0 | COMMUNITY
Start: 2018-09-03 | End: 2018-09-04

## 2018-09-04 RX ORDER — TOPIRAMATE 25 MG/1
25 TABLET ORAL 2 TIMES DAILY
Qty: 60 TABLET | Refills: 0 | Status: SHIPPED | OUTPATIENT
Start: 2018-09-04 | End: 2018-09-19 | Stop reason: HOSPADM

## 2018-09-04 NOTE — PROGRESS NOTES
Assessment/Plan:    No problem-specific Assessment & Plan notes found for this encounter  Diagnoses and all orders for this visit:    Nonintractable headache, unspecified chronicity pattern, unspecified headache type  -     topiramate (TOPAMAX) 25 mg tablet; Take 1 tablet (25 mg total) by mouth 2 (two) times a day  Start with slow increasing of medication  Start with once a day then 2 times if tolerating  Other orders  -     Discontinue: busPIRone (BUSPAR) 15 mg tablet; Take 15 mg by mouth 3 (three) times a day  -     Discontinue: topiramate (TOPAMAX) 25 mg tablet; Take 1 tablet (25 mg total) by mouth 2 (two) times a day          Subjective:      Patient ID: Yolanda Price is a 56 y o  female  HPI    CHRISTY presents today for her headaches  She does have a history of headaches  She states when she gets them on their quite intense  They cause her to have blurring of vision  They usually are across her forehead area  She is also getting pain in her neck  She does get a headache in that area also  She is having the headaches most of the time  She also has a lump on her abdominal area  Has a history of a hernia  They will not repair until she loses wt  Had a surgery last Friday for fissure  The following portions of the patient's history were reviewed and updated as appropriate: allergies, current medications, past family history, past medical history, past social history, past surgical history and problem list     Review of Systems   Constitutional: Negative for activity change, appetite change, chills, fatigue and fever  HENT: Negative for congestion, ear pain, rhinorrhea, sinus pain, sneezing and tinnitus  Respiratory: Negative for cough and chest tightness  Cardiovascular: Negative for chest pain  Gastrointestinal: Positive for nausea  Negative for abdominal pain and diarrhea  Genitourinary: Negative for dysuria and urgency  Musculoskeletal: Negative for back pain  Neurological: Positive for headaches  Negative for dizziness and light-headedness  Objective:  Vitals:    09/04/18 1337   BP: 150/80   BP Location: Left arm   Patient Position: Sitting   Cuff Size: Standard   Pulse: 60   Resp: 18   Weight: (!) 139 kg (306 lb)      Physical Exam   Constitutional: She appears well-developed and well-nourished  HENT:   Mouth/Throat: Oropharynx is clear and moist    Eyes: Conjunctivae and EOM are normal  Pupils are equal, round, and reactive to light  Neck: Normal range of motion  Neck supple  Cardiovascular: Normal rate, regular rhythm and normal heart sounds  Pulmonary/Chest: Effort normal and breath sounds normal    Abdominal: Soft  Normal appearance and bowel sounds are normal  There is tenderness  Lump and hernia on the central abdomen around the umbilicus

## 2018-09-04 NOTE — PATIENT INSTRUCTIONS
General Headache   AMBULATORY CARE:   Headache pain  may be mild or severe  Common causes include stress, medicines, and head injuries  Sleep problems, allergies, and hormone changes can also cause a headache  You may have frequent headaches that have no clear cause  Pain may start in another part of your body and move to your head  Headache pain can also move to other parts of your body  A headache can cause other symptoms, such as nausea and vomiting  A severe headache may be a sign of a stroke or other serious problem that needs immediate treatment  Call 911 for any of the following:   · You have any of the following signs of a stroke:      ¨ Numbness or drooping on one side of your face     ¨ Weakness in an arm or leg    ¨ Confusion or difficulty speaking    ¨ Dizziness, a severe headache, or vision loss    Seek care immediately if:   · You have a headache with neck stiffness and a fever  · You have a constant headache and are vomiting  · You have severe pain that does not get better after you take pain medicine  · You have a headache and the pain worsens when you look into light  · You have a headache and vision changes, such as blurred vision  · You have a headache and are forgetful or confused  Contact your healthcare provider if:   · You have a headache each day that does not get better, even after treatment  · You have changes in your headaches, or new symptoms that occur when you have a headache  · Others you live or work with also have headaches  · You have questions or concerns about your condition or care  Treatment  may include any of the following:  · Medicines  may be given to prevent or treat headache pain  Do not wait until the pain is severe to take your medicine  Ask your healthcare provider how to take the medicine safely  · NSAIDs , such as ibuprofen, help decrease swelling, pain, and fever  This medicine is available with or without a doctor's order   NSAIDs can cause stomach bleeding or kidney problems in certain people  If you take blood thinner medicine, always ask if NSAIDs are safe for you  Always read the medicine label and follow directions  Do not give these medicines to children under 10months of age without direction from your child's healthcare provider  · Acetaminophen  decreases pain and fever  It is available without a doctor's order  Ask how much to take and how often to take it  Follow directions  Read the labels of all other medicines you are using to see if they also contain acetaminophen, or ask your doctor or pharmacist  Acetaminophen can cause liver damage if not taken correctly  Do not use more than 4 grams (4,000 milligrams) total of acetaminophen in one day  · Antinausea medicine  may be given to calm your stomach and help prevent vomiting  Manage your symptoms:   · Rest in a dark and quiet room  This may help decrease your pain  · Apply heat or ice as directed  Heat or ice may help decrease pain or muscle spasms  Apply heat or ice on the area for 20 minutes every 2 hours for as many days as directed  Your healthcare provider may recommend that you alternate heat and ice  · Relax your muscles to help relieve a headache  Lie down in a comfortable position and close your eyes  Relax your muscles slowly  Start at your toes and work your way up your body  A massage or warm bath may also help relax your muscles  Keep a headache record:  Record the dates and times that you get headaches, and what you were doing before the headache started  Also record what you ate and drank in the 24 hours before the headache started  This might help your healthcare provider find the cause of your headaches and make a treatment plan  The record can also help you avoid headache triggers or manage your symptoms  Get enough sleep:  You should get 8 to 10 hours of sleep each night  Create a sleep schedule  Go to bed and wake up at the same times each day  It may be helpful to do something relaxing before bed  Do not watch television right before bed  Do not smoke:  Nicotine and other chemicals in cigarettes and cigars can trigger a headache or make it worse  Ask your healthcare provider for information if you currently smoke and need help to quit  E-cigarettes or smokeless tobacco still contain nicotine  Talk to your healthcare provider before you use these products  Drink liquids as directed: You may need to drink more liquid to prevent dehydration  Dehydration can cause a headache  Ask your healthcare provider how much liquid to drink each day and which liquids are best for you  Limit caffeine and alcohol as directed: Your headaches may be triggered by caffeine or alcohol  You may also develop a headache if you drink caffeine regularly and suddenly stop  Eat a variety of healthy foods:  Do not skip meals  Too little food can trigger a headache  Include fruits, vegetables, whole-grain breads, low-fat dairy products, beans, lean meat, and fish  Do not have trigger foods, such as chocolate and red wine  Foods that contain gluten, nitrates, MSG, or artificial sweeteners may also trigger a headache  Follow up with your healthcare provider as directed:  Write down your questions so you remember to ask them during your visits  © 2017 [facility] Information is for End User's use only and may not be sold, redistributed or otherwise used for commercial purposes  All illustrations and images included in CareNotes® are the copyrighted property of A D [employer] Inc  or [employer]  The above information is an  only  It is not intended as medical advice for individual conditions or treatments  Talk to your doctor, nurse or pharmacist before following any medical regimen to see if it is safe and effective for you

## 2018-09-06 LAB
BUN SERPL-MCNC: 7 MG/DL (ref 7–25)
BUN/CREAT SERPL: NORMAL (CALC) (ref 6–22)
CALCIUM SERPL-MCNC: 9.5 MG/DL (ref 8.6–10.2)
CHLORIDE SERPL-SCNC: 100 MMOL/L (ref 98–110)
CO2 SERPL-SCNC: 31 MMOL/L (ref 20–32)
CREAT SERPL-MCNC: 0.55 MG/DL (ref 0.5–1.1)
GLUCOSE SERPL-MCNC: 80 MG/DL (ref 65–99)
POTASSIUM SERPL-SCNC: 4.5 MMOL/L (ref 3.5–5.3)
SL AMB EGFR AFRICAN AMERICAN: 130 ML/MIN/1.73M2
SL AMB EGFR NON AFRICAN AMERICAN: 112 ML/MIN/1.73M2
SODIUM SERPL-SCNC: 138 MMOL/L (ref 135–146)

## 2018-09-11 ENCOUNTER — HOSPITAL ENCOUNTER (INPATIENT)
Facility: HOSPITAL | Age: 48
LOS: 8 days | Discharge: HOME | DRG: 885 | End: 2018-09-19
Attending: EMERGENCY MEDICINE | Admitting: PSYCHIATRY & NEUROLOGY
Payer: MEDICARE

## 2018-09-11 DIAGNOSIS — R45.851 SUICIDAL IDEATION: ICD-10-CM

## 2018-09-11 DIAGNOSIS — Z00.8 MEDICAL CLEARANCE FOR PSYCHIATRIC ADMISSION: ICD-10-CM

## 2018-09-11 DIAGNOSIS — R32 URINARY INCONTINENCE, UNSPECIFIED TYPE: ICD-10-CM

## 2018-09-11 DIAGNOSIS — F41.9 ANXIETY: ICD-10-CM

## 2018-09-11 DIAGNOSIS — Z00.8 MEDICAL CLEARANCE FOR PSYCHIATRIC ADMISSION: Primary | ICD-10-CM

## 2018-09-11 DIAGNOSIS — R10.33 PERIUMBILICAL ABDOMINAL PAIN: ICD-10-CM

## 2018-09-11 DIAGNOSIS — N32.81 OVERACTIVE BLADDER: ICD-10-CM

## 2018-09-11 DIAGNOSIS — I10 BENIGN ESSENTIAL HYPERTENSION: ICD-10-CM

## 2018-09-11 DIAGNOSIS — F25.0 SCHIZOAFFECTIVE DISORDER, BIPOLAR TYPE (HCC): Primary | Chronic | ICD-10-CM

## 2018-09-11 DIAGNOSIS — G89.29 CHRONIC LOW BACK PAIN: ICD-10-CM

## 2018-09-11 DIAGNOSIS — M54.50 CHRONIC LOW BACK PAIN: ICD-10-CM

## 2018-09-11 DIAGNOSIS — G47.00 INSOMNIA, UNSPECIFIED TYPE: ICD-10-CM

## 2018-09-11 DIAGNOSIS — R60.9 EDEMA, UNSPECIFIED TYPE: ICD-10-CM

## 2018-09-11 DIAGNOSIS — E03.9 HYPOTHYROIDISM, UNSPECIFIED TYPE: ICD-10-CM

## 2018-09-11 DIAGNOSIS — K21.9 GASTROESOPHAGEAL REFLUX DISEASE WITHOUT ESOPHAGITIS: ICD-10-CM

## 2018-09-11 DIAGNOSIS — I10 HYPERTENSION, UNSPECIFIED TYPE: ICD-10-CM

## 2018-09-11 LAB
AMPHETAMINES SERPL QL SCN: NEGATIVE
BARBITURATES UR QL: NEGATIVE
BENZODIAZ UR QL: NEGATIVE
COCAINE UR QL: NEGATIVE
ETHANOL EXG-MCNC: 0 MG/DL
EXT PREG TEST URINE: NEGATIVE
METHADONE UR QL: NEGATIVE
OPIATES UR QL SCN: POSITIVE
PCP UR QL: NEGATIVE
THC UR QL: POSITIVE

## 2018-09-11 PROCEDURE — 80307 DRUG TEST PRSMV CHEM ANLYZR: CPT | Performed by: EMERGENCY MEDICINE

## 2018-09-11 PROCEDURE — 96372 THER/PROPH/DIAG INJ SC/IM: CPT

## 2018-09-11 PROCEDURE — 82075 ASSAY OF BREATH ETHANOL: CPT | Performed by: EMERGENCY MEDICINE

## 2018-09-11 PROCEDURE — 81025 URINE PREGNANCY TEST: CPT | Performed by: EMERGENCY MEDICINE

## 2018-09-11 PROCEDURE — 99285 EMERGENCY DEPT VISIT HI MDM: CPT

## 2018-09-11 RX ORDER — OLANZAPINE 10 MG/1
5 INJECTION, POWDER, LYOPHILIZED, FOR SOLUTION INTRAMUSCULAR
Status: DISCONTINUED | OUTPATIENT
Start: 2018-09-11 | End: 2018-09-19 | Stop reason: HOSPADM

## 2018-09-11 RX ORDER — BENZTROPINE MESYLATE 1 MG/ML
1 INJECTION INTRAMUSCULAR; INTRAVENOUS
Status: CANCELLED | OUTPATIENT
Start: 2018-09-11

## 2018-09-11 RX ORDER — IBUPROFEN 400 MG/1
400 TABLET ORAL EVERY 8 HOURS PRN
Status: CANCELLED | OUTPATIENT
Start: 2018-09-11

## 2018-09-11 RX ORDER — OLANZAPINE 10 MG/1
10 INJECTION, POWDER, LYOPHILIZED, FOR SOLUTION INTRAMUSCULAR ONCE
Status: COMPLETED | OUTPATIENT
Start: 2018-09-11 | End: 2018-09-11

## 2018-09-11 RX ORDER — IBUPROFEN 600 MG/1
600 TABLET ORAL EVERY 8 HOURS PRN
Status: DISCONTINUED | OUTPATIENT
Start: 2018-09-11 | End: 2018-09-12

## 2018-09-11 RX ORDER — RISPERIDONE 1 MG/1
1 TABLET, ORALLY DISINTEGRATING ORAL
Status: DISCONTINUED | OUTPATIENT
Start: 2018-09-11 | End: 2018-09-19 | Stop reason: HOSPADM

## 2018-09-11 RX ORDER — TRAZODONE HYDROCHLORIDE 50 MG/1
50 TABLET ORAL
Status: CANCELLED | OUTPATIENT
Start: 2018-09-11

## 2018-09-11 RX ORDER — IBUPROFEN 400 MG/1
400 TABLET ORAL EVERY 8 HOURS PRN
Status: DISCONTINUED | OUTPATIENT
Start: 2018-09-11 | End: 2018-09-12

## 2018-09-11 RX ORDER — PILOCARPINE HYDROCHLORIDE 5 MG/1
5 TABLET, FILM COATED ORAL 3 TIMES DAILY
COMMUNITY
End: 2019-04-08

## 2018-09-11 RX ORDER — HYDROXYZINE HYDROCHLORIDE 25 MG/1
25 TABLET, FILM COATED ORAL EVERY 4 HOURS PRN
Status: CANCELLED | OUTPATIENT
Start: 2018-09-11

## 2018-09-11 RX ORDER — LORAZEPAM 2 MG/ML
2 INJECTION INTRAMUSCULAR EVERY 4 HOURS PRN
Status: CANCELLED | OUTPATIENT
Start: 2018-09-11

## 2018-09-11 RX ORDER — OLANZAPINE 10 MG/1
INJECTION, POWDER, LYOPHILIZED, FOR SOLUTION INTRAMUSCULAR
Status: COMPLETED
Start: 2018-09-11 | End: 2018-09-11

## 2018-09-11 RX ORDER — LORAZEPAM 2 MG/ML
2 INJECTION INTRAMUSCULAR EVERY 4 HOURS PRN
Status: DISCONTINUED | OUTPATIENT
Start: 2018-09-11 | End: 2018-09-19 | Stop reason: HOSPADM

## 2018-09-11 RX ORDER — LORAZEPAM 2 MG/ML
2 INJECTION INTRAMUSCULAR ONCE
Status: COMPLETED | OUTPATIENT
Start: 2018-09-11 | End: 2018-09-11

## 2018-09-11 RX ORDER — BENZTROPINE MESYLATE 1 MG/1
1 TABLET ORAL
Status: CANCELLED | OUTPATIENT
Start: 2018-09-11

## 2018-09-11 RX ORDER — HYDROXYZINE HYDROCHLORIDE 25 MG/1
25 TABLET, FILM COATED ORAL EVERY 4 HOURS PRN
Status: DISCONTINUED | OUTPATIENT
Start: 2018-09-11 | End: 2018-09-12

## 2018-09-11 RX ORDER — MAGNESIUM HYDROXIDE/ALUMINUM HYDROXICE/SIMETHICONE 120; 1200; 1200 MG/30ML; MG/30ML; MG/30ML
30 SUSPENSION ORAL EVERY 4 HOURS PRN
Status: DISCONTINUED | OUTPATIENT
Start: 2018-09-11 | End: 2018-09-19 | Stop reason: HOSPADM

## 2018-09-11 RX ORDER — LORAZEPAM 2 MG/ML
INJECTION INTRAMUSCULAR
Status: COMPLETED
Start: 2018-09-11 | End: 2018-09-11

## 2018-09-11 RX ORDER — IBUPROFEN 400 MG/1
800 TABLET ORAL EVERY 8 HOURS PRN
Status: CANCELLED | OUTPATIENT
Start: 2018-09-11

## 2018-09-11 RX ORDER — IBUPROFEN 600 MG/1
600 TABLET ORAL EVERY 8 HOURS PRN
Status: CANCELLED | OUTPATIENT
Start: 2018-09-11

## 2018-09-11 RX ORDER — IBUPROFEN 400 MG/1
800 TABLET ORAL EVERY 8 HOURS PRN
Status: DISCONTINUED | OUTPATIENT
Start: 2018-09-11 | End: 2018-09-12

## 2018-09-11 RX ORDER — HALOPERIDOL 5 MG/ML
5 INJECTION INTRAMUSCULAR EVERY 4 HOURS PRN
Status: CANCELLED | OUTPATIENT
Start: 2018-09-11

## 2018-09-11 RX ORDER — ZIPRASIDONE MESYLATE 20 MG/ML
20 INJECTION, POWDER, LYOPHILIZED, FOR SOLUTION INTRAMUSCULAR ONCE
Status: COMPLETED | OUTPATIENT
Start: 2018-09-11 | End: 2018-09-11

## 2018-09-11 RX ORDER — ACETAMINOPHEN 325 MG/1
650 TABLET ORAL EVERY 6 HOURS PRN
Status: DISCONTINUED | OUTPATIENT
Start: 2018-09-11 | End: 2018-09-19 | Stop reason: HOSPADM

## 2018-09-11 RX ORDER — ACETAMINOPHEN 325 MG/1
650 TABLET ORAL EVERY 6 HOURS PRN
Status: CANCELLED | OUTPATIENT
Start: 2018-09-11

## 2018-09-11 RX ORDER — BENZTROPINE MESYLATE 1 MG/1
1 TABLET ORAL
Status: DISCONTINUED | OUTPATIENT
Start: 2018-09-11 | End: 2018-09-19 | Stop reason: HOSPADM

## 2018-09-11 RX ORDER — RISPERIDONE 1 MG/1
1 TABLET, ORALLY DISINTEGRATING ORAL
Status: CANCELLED | OUTPATIENT
Start: 2018-09-11

## 2018-09-11 RX ORDER — TRAZODONE HYDROCHLORIDE 50 MG/1
50 TABLET ORAL
Status: DISCONTINUED | OUTPATIENT
Start: 2018-09-11 | End: 2018-09-19 | Stop reason: HOSPADM

## 2018-09-11 RX ORDER — MAGNESIUM HYDROXIDE/ALUMINUM HYDROXICE/SIMETHICONE 120; 1200; 1200 MG/30ML; MG/30ML; MG/30ML
30 SUSPENSION ORAL EVERY 4 HOURS PRN
Status: CANCELLED | OUTPATIENT
Start: 2018-09-11

## 2018-09-11 RX ORDER — HALOPERIDOL 5 MG/ML
5 INJECTION INTRAMUSCULAR EVERY 4 HOURS PRN
Status: DISCONTINUED | OUTPATIENT
Start: 2018-09-11 | End: 2018-09-19 | Stop reason: HOSPADM

## 2018-09-11 RX ORDER — OLANZAPINE 10 MG/1
5 INJECTION, POWDER, LYOPHILIZED, FOR SOLUTION INTRAMUSCULAR
Status: CANCELLED | OUTPATIENT
Start: 2018-09-11

## 2018-09-11 RX ORDER — BENZTROPINE MESYLATE 1 MG/ML
1 INJECTION INTRAMUSCULAR; INTRAVENOUS
Status: DISCONTINUED | OUTPATIENT
Start: 2018-09-11 | End: 2018-09-19 | Stop reason: HOSPADM

## 2018-09-11 RX ADMIN — ZIPRASIDONE MESYLATE 20 MG: 20 INJECTION, POWDER, LYOPHILIZED, FOR SOLUTION INTRAMUSCULAR at 20:35

## 2018-09-11 RX ADMIN — WATER 10 ML: 1 INJECTION INTRAMUSCULAR; INTRAVENOUS; SUBCUTANEOUS at 19:48

## 2018-09-11 RX ADMIN — OLANZAPINE 10 MG: 10 INJECTION, POWDER, LYOPHILIZED, FOR SOLUTION INTRAMUSCULAR at 19:47

## 2018-09-11 RX ADMIN — LORAZEPAM 2 MG: 2 INJECTION INTRAMUSCULAR; INTRAVENOUS at 20:35

## 2018-09-11 RX ADMIN — OLANZAPINE 10 MG: 10 INJECTION, POWDER, FOR SOLUTION INTRAMUSCULAR at 19:47

## 2018-09-11 RX ADMIN — WATER 10 ML: 1 INJECTION INTRAMUSCULAR; INTRAVENOUS; SUBCUTANEOUS at 20:35

## 2018-09-11 RX ADMIN — LORAZEPAM 2 MG: 2 INJECTION INTRAMUSCULAR at 20:35
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
## 2018-09-11 NOTE — ED PROVIDER NOTES
History  Chief Complaint   Patient presents with   [facility]     +SI, self inflicted superficial lacerations to left forearm     HPI    36-year-old female with a history past hospitalizations for suicidal ideation, most recently in July of 2018, presenting today with suicidal ideation and self-injurious behavior  She stated that on Friday her friend sent her a "harmful" text message and that sent her into a spiral wanting to harm herself  She cut herself on her left arm with a razor blade multiple times  There also multiple healed injuries on that same arm  Currently has suicidal ideation and a plan to continue to harm herself with the same razor blades  Tetanus up to date in July 2018  She does not have homicidal ideation or plan  She used to hear voices, but she has not heard any recently  She has no visual hallucinations  She is willing to sign herself in     Prior to Admission Medications   Prescriptions Last Dose Informant Patient Reported? Taking?    amLODIPine (NORVASC) 10 mg tablet  Self No Yes   Sig: Take 1 tablet (10 mg total) by mouth daily   Patient taking differently: Take 10 mg by mouth daily at bedtime     buPROPion (WELLBUTRIN SR) 200 MG 12 hr tablet  Self Yes Yes   Sig: Take 200 mg by mouth 2 (two) times a day   busPIRone (BUSPAR) 30 MG tablet  Self Yes No   Sig: Take 15 mg by mouth 3 (three) times a day     estradiol (ESTRACE) 1 mg tablet  Self Yes Yes   Sig: Take 1 mg by mouth daily   furosemide (LASIX) 20 mg tablet 9/11/2018 at Unknown time Self Yes Yes   Sig: Take 20 mg by mouth daily     levothyroxine 50 mcg tablet 9/11/2018 at Unknown time Self No Yes   Sig: Take 1 tablet (50 mcg total) by mouth daily   Patient taking differently: Take 250 mcg by mouth daily     losartan (COZAAR) 50 mg tablet 9/11/2018 at Unknown time Self Yes Yes   Sig: Take 50 mg by mouth daily     lurasidone (LATUDA) 80 mg tablet 9/11/2018 at Unknown time Self Yes Yes   Sig: Take 100 mg by mouth daily with breakfast     metoprolol tartrate (LOPRESSOR) 50 mg tablet 9/11/2018 at Unknown time Self No Yes   Sig: Take 1 tablet (50 mg total) by mouth every 12 (twelve) hours   omeprazole (PriLOSEC) 40 MG capsule 9/11/2018 at Unknown time Self Yes Yes   Sig: Take 40 mg by mouth 2 (two) times a day     pilocarpine (SALAGEN) 5 mg tablet   Yes Yes   Sig: Take 5 mg by mouth 3 (three) times a day   potassium chloride (K-DUR,KLOR-CON) 20 mEq tablet  Self Yes Yes   Sig: Take 20 mEq by mouth daily     tiZANidine (ZANAFLEX) 4 mg tablet  Self No Yes   Sig: Take 1 tablet (4 mg total) by mouth 3 (three) times a day as needed for muscle spasms   topiramate (TOPAMAX) 25 mg tablet   No Yes   Sig: Take 1 tablet (25 mg total) by mouth 2 (two) times a day   traZODone (DESYREL) 100 mg tablet  Self Yes Yes   Sig: Take 200 mg by mouth daily at bedtime      Facility-Administered Medications: None       Past Medical History:   Diagnosis Date    Anxiety     Arthritis     oa cassandra knees    Asthma     good control    Yan's esophagus     Bipolar affective disorder (HCC)     Cellulitis of abdominal wall     Chronic pain disorder     lumbar    CPAP (continuous positive airway pressure) dependence     Degenerative disc disease at L5-S1 level     Depression 09/16/2008    Disease of thyroid gland     hypo    MARTINEZ (dyspnea on exertion)     Drug overdose 10/28/2008    suicide attempt    Dyspepsia     chronic    Dysphagia     Dysphagia     Dyspnea     Edema     GERD (gastroesophageal reflux disease)     High blood pressure     Migraines     Overactive bladder     Rectal bleeding     Sjogren's disease (HCC)     Sleep apnea     Stress incontinence     Suicidal ideations     Umbilical hernia        Past Surgical History:   Procedure Laterality Date    BREAST LUMPECTOMY Right     reduction    CARPAL TUNNEL RELEASE      left    CHOLECYSTECTOMY      5/2003    COLONOSCOPY      01/12/2009    DILATION AND CURETTAGE OF UTERUS      ELBOW SURGERY Left     ESOPHAGOGASTRODUODENOSCOPY      HYSTERECTOMY      laporoscopic, partial    KNEE ARTHROSCOPY      right    LAPAROSCOPIC HYSTERECTOMY      OH ANAL SPHINCTEROTOMY N/A 8/31/2018    Procedure: EUA, LEFT PARTIAL INTERNAL SPHINCTEROTOMY;  Surgeon: Radha Gonzalez MD;  Location: [address] [place] [address] MAIN OR;  Service: Colorectal    REPAIR LACERATION Left     left hand-5/18/2009    TONSILECTOMY AND ADNOIDECTOMY      TONSILLECTOMY      VEIN LIGATION Bilateral        Family History   Problem Relation Age of Onset    Kidney cancer Mother     Diabetes Mother     Colon cancer Family      I have reviewed and agree with the history as documented  Social History   Substance Use Topics    Smoking status: Former Smoker     Types: Cigarettes     Quit date: 1/1/2014    Smokeless tobacco: Never Used      Comment: 3 packs per day    Alcohol use No        Review of Systems   Constitutional: Negative for chills, fatigue and fever  HENT: Negative for sore throat  Eyes: Negative for redness and visual disturbance  Respiratory: Negative for shortness of breath  Cardiovascular: Negative for chest pain  Gastrointestinal: Negative for abdominal pain, diarrhea and nausea  Genitourinary: Negative for difficulty urinating, dysuria and pelvic pain  Musculoskeletal: Negative for back pain  Skin: Negative for rash  Neurological: Negative for syncope, weakness and headaches  Psychiatric/Behavioral: Positive for self-injury and suicidal ideas  Negative for decreased concentration  All other systems reviewed and are negative        Physical Exam  ED Triage Vitals [09/11/18 1457]   Temperature Pulse Respirations Blood Pressure SpO2   98 °F (36 7 °C) 60 18 165/80 95 %      Temp Source Heart Rate Source Patient Position - Orthostatic VS BP Location FiO2 (%)   Tympanic Monitor Sitting Right arm --      Pain Score       No Pain           Orthostatic Vital Signs  Vitals:    09/11/18 2205 09/12/18 1239 09/12/18 1952 09/12/18 2215   BP: 138/60 141/65 117/66 137/67   Pulse: 74 78 72 60   Patient Position - Orthostatic VS: Sitting Lying Lying Sitting       Physical Exam   Constitutional: She is oriented to person, place, and time  She appears well-developed and well-nourished  No distress  HENT:   Head: Normocephalic and atraumatic  Eyes: Conjunctivae are normal  Pupils are equal, round, and reactive to light  Cardiovascular: Normal rate, regular rhythm and normal heart sounds  No murmur heard  Pulmonary/Chest: No respiratory distress  Abdominal: Soft  Bowel sounds are normal  There is no tenderness  Musculoskeletal: Normal range of motion  Neurological: She is alert and oriented to person, place, and time  No cranial nerve deficit or sensory deficit  She exhibits normal muscle tone  Coordination normal    Skin: Skin is warm and dry  No rash noted  multiple superficial abrasions on her left forearm  Old scars on her right thigh  Psychiatric: She has a normal mood and affect  Nursing note and vitals reviewed        ED Medications  Medications   acetaminophen (TYLENOL) tablet 650 mg (650 mg Oral Given 9/12/18 1638)   aluminum-magnesium hydroxide-simethicone (MYLANTA) 200-200-20 mg/5 mL oral suspension 30 mL (not administered)   benztropine (COGENTIN) injection 1 mg (1 mg Intramuscular Given 9/12/18 1809)   benztropine (COGENTIN) tablet 1 mg (not administered)   haloperidol lactate (HALDOL) injection 5 mg (5 mg Intramuscular Given 9/12/18 1809)   LORazepam (ATIVAN) 2 mg/mL injection 2 mg (2 mg Intramuscular Given 9/12/18 1809)   magnesium hydroxide (MILK OF MAGNESIA) 400 mg/5 mL oral suspension 30 mL (not administered)   OLANZapine (ZyPREXA) IM injection 5 mg (not administered)   risperiDONE (RisperDAL M-TABS) dispersible tablet 1 mg (1 mg Oral Given 9/12/18 1251)   traZODone (DESYREL) tablet 50 mg (50 mg Oral Given 9/12/18 0019)   amLODIPine (NORVASC) tablet 10 mg (10 mg Oral Given 9/12/18 2218)   buPROPion [facility] SR) 12 hr tablet 200 mg (200 mg Oral Given 9/12/18 1901)   busPIRone (BUSPAR) tablet 15 mg (15 mg Oral Given 9/12/18 2217)   estradiol (ESTRACE) tablet 1 mg (1 mg Oral Given 9/12/18 1412)   furosemide (LASIX) tablet 20 mg (20 mg Oral Given 9/12/18 1412)   losartan (COZAAR) tablet 50 mg (50 mg Oral Given 9/12/18 1413)   metoprolol tartrate (LOPRESSOR) tablet 50 mg (50 mg Oral Given 9/12/18 2217)   pilocarpine (SALAGEN) tablet 5 mg (5 mg Oral Given 9/12/18 2217)   potassium chloride (K-DUR,KLOR-CON) CR tablet 20 mEq (20 mEq Oral Given 9/12/18 1246)   tiZANidine (ZANAFLEX) tablet 4 mg (not administered)   traZODone (DESYREL) tablet 200 mg (200 mg Oral Given 9/12/18 2218)   levothyroxine tablet 250 mcg (250 mcg Oral Given 9/12/18 1609)   lurasidone (LATUDA) tablet 100 mg (100 mg Oral Given 9/12/18 1902)   topiramate (TOPAMAX) tablet 25 mg (25 mg Oral Given 9/12/18 1246)   lidocaine (LIDODERM) 5 % patch 1 patch (1 patch Transdermal Patch Removed 9/13/18 0046)   LORazepam (ATIVAN) tablet 2 mg (2 mg Oral Given 9/12/18 1454)   topiramate (TOPAMAX) tablet 50 mg (50 mg Oral Given 9/12/18 1919)   hydrOXYzine HCL (ATARAX) tablet 50 mg (not administered)   ibuprofen (MOTRIN) tablet 600 mg (not administered)   ibuprofen (MOTRIN) tablet 600 mg (not administered)   pantoprazole (PROTONIX) EC tablet 40 mg (not administered)   OLANZapine (ZyPREXA) IM injection 10 mg (10 mg Intramuscular Given 9/11/18 1947)   sterile water injection **AcuDose Override Pull** (10 mL  Given 9/11/18 1948)   LORazepam (ATIVAN) 2 mg/mL injection 2 mg (2 mg Intramuscular Given 9/11/18 2035)   ziprasidone (GEODON) IM injection 20 mg (20 mg Intramuscular Given 9/11/18 2035)   sterile water injection **AcuDose Override Pull** (10 mL  Given 9/11/18 2035)       Diagnostic Studies  Results Reviewed     Procedure Component Value Units Date/Time    POCT pregnancy, urine [[id number]]  (Normal) Resulted:  09/11/18 1552    Lab Status:  Final result Updated: 09/11/18 1552     EXT PREG TEST UR (Ref: Negative) negative    Rapid drug screen, urine [[id number]]  (Abnormal) Collected:  09/11/18 1503    Lab Status:  Final result Specimen:  Urine from Urine, Other Updated:  09/11/18 1527     Amph/Meth UR Negative     Barbiturate Ur Negative     Benzodiazepine Urine Negative     Cocaine Urine Negative     Methadone Urine Negative     Opiate Urine Positive (A)     PCP Ur Negative     THC Urine Positive (A)    Narrative:         Presumptive report  If requested, specimen will be sent to reference lab for confirmation  FOR MEDICAL PURPOSES ONLY  IF CONFIRMATION NEEDED PLEASE CONTACT THE LAB WITHIN 5 DAYS  Drug Screen Cutoff Levels:  AMPHETAMINE/METHAMPHETAMINES  1000 ng/mL  BARBITURATES     200 ng/mL  BENZODIAZEPINES     200 ng/mL  COCAINE      300 ng/mL  METHADONE      300 ng/mL  OPIATES      300 ng/mL  PHENCYCLIDINE     25 ng/mL  THC       50 ng/mL    POCT alcohol breath test [[id number]]  (Normal) Resulted:  09/11/18 1505    Lab Status:  Final result Updated:  09/11/18 1505     EXTBreath Alcohol 0 000                 XR hand 3+ vw left    (Results Pending)         Procedures  Procedures      Phone Consults  ED Phone Contact    ED Course  ED Course as of Sep 12 2228   Tue Sep 11, 2018   1712 Crisis to evaluate patient, she will sign 12      MDM  CritCare Time      15-year-old female presenting emergency department for evaluation of suicidal ideation  Will get crisis to see the patient urine drug screen as well as breath alcohol test and urine pregnancy  Patient is medically cleared patient has signed a 201 form pending placement  Patient transferred to behavioral [address]    Disposition  Final diagnoses:   Suicidal ideation     Time reflects when diagnosis was documented in both MDM as applicable and the Disposition within this note     Time User Action Codes Description Comment    9/11/2018  6:15 PM Riya Macario 67 Suicidal ideation     9/11/2018 10:08 PM Deb Blanco Add [Z00 8] Medical clearance for psychiatric admission     9/12/2018  8:44 AM Stengel Hohenshilt, W Romayne Add [E43 36] Periumbilical abdominal pain     9/12/2018  8:44 AM Stengel Hohenshilt, W Romayne Modify [T88 52] Periumbilical abdominal pain     9/12/2018  2:06 PM Sury Scarvi Add [I10] Benign essential hypertension     9/12/2018  2:07 PM Alfredo Summers Add [R60 9] Edema, unspecified type     9/12/2018  2:07 PM Sury Scarvi Add [N32 81] Overactive bladder     9/12/2018  2:07 PM Margarita Summers Add [R32] Urinary incontinence, unspecified type       ED Disposition     ED Disposition Condition Comment    Transfer to [address] [place] [address] [address] should be transferred out to [facility] and has been medically cleared         MD Documentation      Most Recent Value   Accepting Physician  [address] Name, [address]  [address]   Sending MD Marcie Sacks      RN Documentation      [facility] Name, [address]   SL[id number]      Follow-up Information    None         Current Discharge Medication List      CONTINUE these medications which have NOT CHANGED    Details   amLODIPine (NORVASC) 10 mg tablet Take 1 tablet (10 mg total) by mouth daily  Qty: 30 tablet, Refills: 5    Associated Diagnoses: Essential hypertension      buPROPion (WELLBUTRIN SR) 200 MG 12 hr tablet Take 200 mg by mouth 2 (two) times a day  Refills: 0      estradiol (ESTRACE) 1 mg tablet Take 1 mg by mouth daily      furosemide (LASIX) 20 mg tablet Take 20 mg by mouth daily        levothyroxine 50 mcg tablet Take 1 tablet (50 mcg total) by mouth daily  Qty: 90 tablet, Refills: 3    Associated Diagnoses: Hypothyroidism, unspecified type      losartan (COZAAR) 50 mg tablet Take 50 mg by mouth daily    Refills: 0      lurasidone (LATUDA) 80 mg tablet Take 100 mg by mouth daily with breakfast        metoprolol tartrate (LOPRESSOR) 50 mg tablet Take 1 tablet (50 mg total) by mouth every 12 (twelve) hours  Qty: 180 tablet, Refills: 3    Associated Diagnoses: Essential hypertension, benign      omeprazole (PriLOSEC) 40 MG capsule Take 40 mg by mouth 2 (two) times a day        pilocarpine (SALAGEN) 5 mg tablet Take 5 mg by mouth 3 (three) times a day      potassium chloride (K-DUR,KLOR-CON) 20 mEq tablet Take 20 mEq by mouth daily    Refills: 0      tiZANidine (ZANAFLEX) 4 mg tablet Take 1 tablet (4 mg total) by mouth 3 (three) times a day as needed for muscle spasms  Qty: 30 tablet, Refills: 12    Associated Diagnoses: Abnormality of gait and mobility      topiramate (TOPAMAX) 25 mg tablet Take 1 tablet (25 mg total) by mouth 2 (two) times a day  Qty: 60 tablet, Refills: 0    Associated Diagnoses: Nonintractable headache, unspecified chronicity pattern, unspecified headache type      traZODone (DESYREL) 100 mg tablet Take 200 mg by mouth daily at bedtime      busPIRone (BUSPAR) 30 MG tablet Take 15 mg by mouth 3 (three) times a day    Refills: 0           No discharge procedures on file  ED Provider  Attending physically available and evaluated Irma Lola TAFOYA managed the patient along with the ED Attending      Electronically Signed by         Jennifer Gutierrez MD  09/12/18 7616

## 2018-09-11 NOTE — ED NOTES
Patient continues to pick at her skin with her fingernails, Dr Andre Bianchi notified and advised to wrap the patient's arm in a kerlix dressing  Patient rolled over on her side and refused to allow staff to apply a dressing to her arm, security needed to be called to hold patient in position to apply the dressing       Lola Garcia RN  09/11/18 3853

## 2018-09-11 NOTE — ED NOTES
Crisis worker at bedside to speak to patient, patient refusing to speak to crisis worker, pulling blanket over her head and refusing to respond to direct questions  Immediately after crisis worker left bedside, patient asked a staff member to escort her to the bathroom        Karthik Guerrero RN  09/11/18 3411

## 2018-09-11 NOTE — ED ATTENDING ATTESTATION
I, Nolan Aquino DO, saw and evaluated the patient  I have discussed the patient with the resident/non-physician practitioner and agree with the resident's/non-physician practitioner's findings, Plan of Care, and MDM as documented in the resident's/non-physician practitioner's note, except where noted  All available labs and Radiology studies were reviewed  At this point I agree with the current assessment done in the Emergency Department  I have conducted an independent evaluation of this patient a history and physical is as follows:      Critical Care Time  CritCare Time    Procedures     56 yr old fem to the ED with SI and intent on harm to self  Started after problem with friend on Friday  On medication and taking  Last adm in July  Sent to ED by   Exm: superficial lacerations on left arm  Heart: rrr  Pupils mid  Lungs: cta Pln: UDS and crisis

## 2018-09-11 NOTE — ED NOTES
As per EVS, pt has Medicare A&B primary  Secondary is [employer]   Completed COB with Hollie Gee [phone]

## 2018-09-11 NOTE — ED NOTES
Patient found to be punching the wall   Charge RN made aware, patient to be moved to room 7 when available     Roberts Meckel, RN  09/11/18 0945

## 2018-09-11 NOTE — ED NOTES
Patient is picking the skin off of her forearm with her fingernails, instructed to stop multiple times by multiple staff members but continues to do so  Dr Abeba Mittal at bedside to speak to patient and she agreed to sign a 201 at this time        Mane Guy RN  09/11/18 9719

## 2018-09-11 NOTE — ED NOTES
Pt comes to the ed with thoughts of suicide  Pts  from [facility] Bray [phone] states pt was very flat when she arrived at pts home today  Pt usually jokes with her  Pt stated she had an argument with her best friend  The friend yelled and called pt names  Pt has been depressed and cut her left arm  Pt told Forrest Cristobal that she wanted to kill herself  Pt would not answer questions in the ed but did say she wanted to sign a 201  As per Forrest Cristobal, pt has MDD and Borderline personality d/o

## 2018-09-11 NOTE — ED NOTES
Pt is refusing vitals at this time  Pt will not answer questions and continues to pick the skin of her left forearm, instructed pt to stop multiple times        Pennie Gaucher  09/11/18 3977

## 2018-09-12 ENCOUNTER — APPOINTMENT (INPATIENT)
Dept: RADIOLOGY | Facility: HOSPITAL | Age: 48
DRG: 885 | End: 2018-09-12
Payer: MEDICARE

## 2018-09-12 PROBLEM — F25.0 SCHIZOAFFECTIVE DISORDER, BIPOLAR TYPE (HCC): Chronic | Status: ACTIVE | Noted: 2017-03-17

## 2018-09-12 PROCEDURE — 99222 1ST HOSP IP/OBS MODERATE 55: CPT | Performed by: PSYCHIATRY & NEUROLOGY

## 2018-09-12 PROCEDURE — 73130 X-RAY EXAM OF HAND: CPT

## 2018-09-12 RX ORDER — METOPROLOL TARTRATE 50 MG/1
50 TABLET, FILM COATED ORAL EVERY 12 HOURS SCHEDULED
Status: DISCONTINUED | OUTPATIENT
Start: 2018-09-12 | End: 2018-09-19 | Stop reason: HOSPADM

## 2018-09-12 RX ORDER — LIDOCAINE 50 MG/G
1 PATCH TOPICAL DAILY
Status: DISCONTINUED | OUTPATIENT
Start: 2018-09-12 | End: 2018-09-19 | Stop reason: HOSPADM

## 2018-09-12 RX ORDER — PANTOPRAZOLE SODIUM 40 MG/1
40 TABLET, DELAYED RELEASE ORAL
Status: DISCONTINUED | OUTPATIENT
Start: 2018-09-13 | End: 2018-09-19 | Stop reason: HOSPADM

## 2018-09-12 RX ORDER — FUROSEMIDE 20 MG/1
20 TABLET ORAL DAILY
Status: DISCONTINUED | OUTPATIENT
Start: 2018-09-12 | End: 2018-09-19 | Stop reason: HOSPADM

## 2018-09-12 RX ORDER — LORAZEPAM 1 MG/1
2 TABLET ORAL EVERY 6 HOURS PRN
Status: DISCONTINUED | OUTPATIENT
Start: 2018-09-12 | End: 2018-09-19 | Stop reason: HOSPADM

## 2018-09-12 RX ORDER — AMLODIPINE BESYLATE 10 MG/1
10 TABLET ORAL
Status: DISCONTINUED | OUTPATIENT
Start: 2018-09-12 | End: 2018-09-19 | Stop reason: HOSPADM

## 2018-09-12 RX ORDER — LOSARTAN POTASSIUM 50 MG/1
50 TABLET ORAL DAILY
Status: DISCONTINUED | OUTPATIENT
Start: 2018-09-12 | End: 2018-09-19 | Stop reason: HOSPADM

## 2018-09-12 RX ORDER — IBUPROFEN 600 MG/1
600 TABLET ORAL EVERY 6 HOURS PRN
Status: DISCONTINUED | OUTPATIENT
Start: 2018-09-12 | End: 2018-09-19 | Stop reason: HOSPADM

## 2018-09-12 RX ORDER — LEVOTHYROXINE SODIUM 0.12 MG/1
250 TABLET ORAL
Status: DISCONTINUED | OUTPATIENT
Start: 2018-09-12 | End: 2018-09-19 | Stop reason: HOSPADM

## 2018-09-12 RX ORDER — PANTOPRAZOLE SODIUM 40 MG/1
40 TABLET, DELAYED RELEASE ORAL
Status: DISCONTINUED | OUTPATIENT
Start: 2018-09-12 | End: 2018-09-12

## 2018-09-12 RX ORDER — PILOCARPINE HYDROCHLORIDE 5 MG/1
5 TABLET, FILM COATED ORAL 3 TIMES DAILY
Status: DISCONTINUED | OUTPATIENT
Start: 2018-09-12 | End: 2018-09-19 | Stop reason: HOSPADM

## 2018-09-12 RX ORDER — LEVOTHYROXINE SODIUM 0.12 MG/1
250 TABLET ORAL DAILY
Status: DISCONTINUED | OUTPATIENT
Start: 2018-09-12 | End: 2018-09-12

## 2018-09-12 RX ORDER — TRAZODONE HYDROCHLORIDE 100 MG/1
200 TABLET ORAL
Status: DISCONTINUED | OUTPATIENT
Start: 2018-09-12 | End: 2018-09-19 | Stop reason: HOSPADM

## 2018-09-12 RX ORDER — HYDROXYZINE 50 MG/1
50 TABLET, FILM COATED ORAL EVERY 6 HOURS PRN
Status: DISCONTINUED | OUTPATIENT
Start: 2018-09-12 | End: 2018-09-19 | Stop reason: HOSPADM

## 2018-09-12 RX ORDER — BUPROPION HYDROCHLORIDE 100 MG/1
200 TABLET, EXTENDED RELEASE ORAL 2 TIMES DAILY
Status: DISCONTINUED | OUTPATIENT
Start: 2018-09-12 | End: 2018-09-13

## 2018-09-12 RX ORDER — IBUPROFEN 600 MG/1
600 TABLET ORAL EVERY 8 HOURS PRN
Status: DISCONTINUED | OUTPATIENT
Start: 2018-09-12 | End: 2018-09-19 | Stop reason: HOSPADM

## 2018-09-12 RX ORDER — TIZANIDINE 4 MG/1
4 TABLET ORAL 3 TIMES DAILY PRN
Status: DISCONTINUED | OUTPATIENT
Start: 2018-09-12 | End: 2018-09-19 | Stop reason: HOSPADM

## 2018-09-12 RX ORDER — TOPIRAMATE 25 MG/1
50 TABLET ORAL
Status: DISCONTINUED | OUTPATIENT
Start: 2018-09-12 | End: 2018-09-12

## 2018-09-12 RX ORDER — TOPIRAMATE 25 MG/1
50 TABLET ORAL
Status: DISCONTINUED | OUTPATIENT
Start: 2018-09-12 | End: 2018-09-19 | Stop reason: HOSPADM

## 2018-09-12 RX ORDER — POTASSIUM CHLORIDE 20 MEQ/1
20 TABLET, EXTENDED RELEASE ORAL DAILY
Status: DISCONTINUED | OUTPATIENT
Start: 2018-09-12 | End: 2018-09-19 | Stop reason: HOSPADM

## 2018-09-12 RX ORDER — TOPIRAMATE 25 MG/1
25 TABLET ORAL 2 TIMES DAILY
Status: DISCONTINUED | OUTPATIENT
Start: 2018-09-12 | End: 2018-09-12

## 2018-09-12 RX ORDER — TOPIRAMATE 25 MG/1
25 TABLET ORAL DAILY
Status: DISCONTINUED | OUTPATIENT
Start: 2018-09-12 | End: 2018-09-13

## 2018-09-12 RX ORDER — ESTRADIOL 1 MG/1
1 TABLET ORAL DAILY
Status: DISCONTINUED | OUTPATIENT
Start: 2018-09-12 | End: 2018-09-19 | Stop reason: HOSPADM

## 2018-09-12 RX ORDER — IBUPROFEN 600 MG/1
600 TABLET ORAL EVERY 6 HOURS PRN
Status: DISCONTINUED | OUTPATIENT
Start: 2018-09-12 | End: 2018-09-12

## 2018-09-12 RX ADMIN — METOPROLOL TARTRATE 50 MG: 50 TABLET, FILM COATED ORAL at 22:17

## 2018-09-12 RX ADMIN — BENZTROPINE MESYLATE 1 MG: 1 INJECTION INTRAMUSCULAR; INTRAVENOUS at 18:09

## 2018-09-12 RX ADMIN — AMLODIPINE BESYLATE 10 MG: 10 TABLET ORAL at 22:18

## 2018-09-12 RX ADMIN — LORAZEPAM 2 MG: 2 INJECTION INTRAMUSCULAR; INTRAVENOUS at 18:09

## 2018-09-12 RX ADMIN — TOPIRAMATE 50 MG: 25 TABLET, FILM COATED ORAL at 19:19

## 2018-09-12 RX ADMIN — BUSPIRONE HYDROCHLORIDE 15 MG: 10 TABLET ORAL at 22:17

## 2018-09-12 RX ADMIN — HYDROXYZINE HYDROCHLORIDE 25 MG: 25 TABLET ORAL at 00:19

## 2018-09-12 RX ADMIN — ACETAMINOPHEN 650 MG: 325 TABLET, FILM COATED ORAL at 16:38

## 2018-09-12 RX ADMIN — TRAZODONE HYDROCHLORIDE 50 MG: 50 TABLET ORAL at 00:19

## 2018-09-12 RX ADMIN — PILOCARPINE HYDROCHLORIDE 5 MG: 5 TABLET, FILM COATED ORAL at 16:09

## 2018-09-12 RX ADMIN — LIDOCAINE 1 PATCH: 50 PATCH CUTANEOUS at 12:46

## 2018-09-12 RX ADMIN — LURASIDONE HYDROCHLORIDE 100 MG: 80 TABLET, FILM COATED ORAL at 19:02

## 2018-09-12 RX ADMIN — BUPROPION HYDROCHLORIDE 200 MG: 100 TABLET, EXTENDED RELEASE ORAL at 19:01

## 2018-09-12 RX ADMIN — LOSARTAN POTASSIUM 50 MG: 50 TABLET ORAL at 14:13

## 2018-09-12 RX ADMIN — BUPROPION HYDROCHLORIDE 200 MG: 100 TABLET, EXTENDED RELEASE ORAL at 12:46

## 2018-09-12 RX ADMIN — METOPROLOL TARTRATE 50 MG: 50 TABLET, FILM COATED ORAL at 12:46

## 2018-09-12 RX ADMIN — TOPIRAMATE 25 MG: 25 TABLET, FILM COATED ORAL at 12:46

## 2018-09-12 RX ADMIN — POTASSIUM CHLORIDE 20 MEQ: 1500 TABLET, EXTENDED RELEASE ORAL at 12:46

## 2018-09-12 RX ADMIN — HALOPERIDOL LACTATE 5 MG: 5 INJECTION, SOLUTION INTRAMUSCULAR at 07:04

## 2018-09-12 RX ADMIN — HYDROXYZINE HYDROCHLORIDE 25 MG: 25 TABLET ORAL at 12:51

## 2018-09-12 RX ADMIN — HALOPERIDOL LACTATE 5 MG: 5 INJECTION, SOLUTION INTRAMUSCULAR at 18:09

## 2018-09-12 RX ADMIN — FUROSEMIDE 20 MG: 20 TABLET ORAL at 14:12

## 2018-09-12 RX ADMIN — LEVOTHYROXINE SODIUM 250 MCG: 125 TABLET ORAL at 16:09

## 2018-09-12 RX ADMIN — PILOCARPINE HYDROCHLORIDE 5 MG: 5 TABLET, FILM COATED ORAL at 22:17

## 2018-09-12 RX ADMIN — PANTOPRAZOLE SODIUM 40 MG: 40 TABLET, DELAYED RELEASE ORAL at 16:09

## 2018-09-12 RX ADMIN — TRAZODONE HYDROCHLORIDE 200 MG: 100 TABLET ORAL at 22:18

## 2018-09-12 RX ADMIN — BUSPIRONE HYDROCHLORIDE 15 MG: 10 TABLET ORAL at 16:09

## 2018-09-12 RX ADMIN — RISPERIDONE 1 MG: 1 TABLET, ORALLY DISINTEGRATING ORAL at 12:51

## 2018-09-12 RX ADMIN — LORAZEPAM 2 MG: 1 TABLET ORAL at 14:54

## 2018-09-12 RX ADMIN — LORAZEPAM 2 MG: 2 INJECTION INTRAMUSCULAR; INTRAVENOUS at 07:04

## 2018-09-12 RX ADMIN — BENZTROPINE MESYLATE 1 MG: 1 INJECTION INTRAMUSCULAR; INTRAVENOUS at 07:04

## 2018-09-12 RX ADMIN — ESTRADIOL 1 MG: 1 TABLET ORAL at 14:12

## 2018-09-12 NOTE — ED NOTES
Patient requesting to speak with crisis worker regarding plan of care  Nilson Tima called, stated he is with another patient and will come speak with the patient when he is finished        Raquel Mcintosh RN  09/11/18 8686

## 2018-09-12 NOTE — ED NOTES
Pt becoming increasingly aggitated because she hasn't eaten in two days and is trying to currently flip the bed  Dr Estrada Chaudhari was bedside, as soon as Dr Estrada Chaudhari left, pt got out of right arm restraint and tried to flip the bed on herself  Security called and assisted pt back into restraints        [facility]Asc[facility]  09/11/18 2032       Etta [facility]  09/11/18 2042

## 2018-09-12 NOTE — PROGRESS NOTES
Pt arrived on a 201 due to suicidal thoughts without a specific plan after having an argument with a friend  She had also cut left wrist superficially and burn herself  Pt states that cutting herself was not a suicide attempt but was releasing the pain she was feeling  On admission she stated that she had fleeting suicidal thoughts  She stated that she would come to staff before she would act on the suicidal thoughts  Pt denies AH, VH, and HI  She stated that she acting out in the ER because he was in the hallway and was hungry and was being ignored  She was picking at her left wrist in the ER and was put in restraints  Pt was given IM Geodon and Ativan, earlier she had received Zyprexa IM  On admission she is irritable but cooperative  Recently had surgery on her right knee but denies any problems with mobility  Pt stated that she was recently at [facility] for suicidal thoughts  Medically she has Asthma, HTN, Hypothyroidism, Morbid obesity, bipolar and chronic low back pain

## 2018-09-12 NOTE — PROGRESS NOTES
The patient has been admitted for suicidal ideation   Admission orders have been placed and the patient will be seen by the team in the morning

## 2018-09-12 NOTE — ED NOTES
Pt crying and stating "i just want to fucking kill myself" "I should have never told anybody"         Jamie Martinez  09/11/18 2041

## 2018-09-12 NOTE — PROGRESS NOTES
Portable hand x-ray done(left)  Pt requested Tylenol for pain and 650 mgs po given  Anthony Armstrong called from [facility] to find out how patient was and she said the patient's ICM Fabeatrice Dates will try to stop by tomorrow to see the patient

## 2018-09-12 NOTE — PROGRESS NOTES
After interview pt was given Trazodone and Atarax PO for complaints of not being able to sleep and anxiety  Pt was started on her own CPAP machine
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
## 2018-09-12 NOTE — PROGRESS NOTES
Patient not following staff direction  Agitated; threatening to harm herself and hitting her head against the wall  Security called to the unit  Patient still not responding to redirection  Again banging head and started to punch the walls as well  Patient was placed in 4 point restraints at that time

## 2018-09-12 NOTE — PROGRESS NOTES
Patient reports fleeting SI ; no plan and is able to contract for safety  Also reports depression and anxiety  Tearful during interaction  Mainly seclusive to her room  Compliant with medications and no further behavioral issues as of this time  Will monitor

## 2018-09-12 NOTE — PROGRESS NOTES
Patient had loosened the Curlex that was wrapped around her arm and she took it off and with staff present she tried to put it around her neck - what she did was put the Curlex around the back of her neck and fold her arms while holding on to each end of the bandage  While she was doing this staff were actively removing it from her   There was a 2nd Curlex on her arm too and it was removed at that time  When she was asked what was going on she said "I don't want to live "  Bed linens and blankets were also removed, patient is already wearing paper  Patient has been placed on 1-1 close proximity observation  Dr ERIC stephens

## 2018-09-12 NOTE — PLAN OF CARE
Problem: Nutrition/Hydration-ADULT  Goal: Nutrient/Hydration intake appropriate for improving, restoring or maintaining nutritional needs  Monitor and assess patient's nutrition/hydration status for malnutrition (ex- brittle hair, bruises, dry skin, pale skin and conjunctiva, muscle wasting, smooth red tongue, and disorientation)  Collaborate with interdisciplinary team and initiate plan and interventions as ordered  Monitor patient's weight and dietary intake as ordered or per policy  Utilize nutrition screening tool and intervene per policy  Determine patient's food preferences and provide high-protein, high-caloric foods as appropriate       INTERVENTIONS:  - Monitor oral intake, urinary output, labs, and treatment plans  - Assess nutrition and hydration status and recommend course of action  - Evaluate amount of meals eaten  - Assist patient with eating if necessary   - Allow adequate time for meals  - Recommend/ encourage appropriate diets, oral nutritional supplements, and vitamin/mineral supplements  - Order, calculate, and assess calorie counts as needed  - Recommend, monitor, and adjust tube feedings and TPN/PPN based on assessed needs  - Assess need for intravenous fluids  - Provide specific nutrition/hydration education as appropriate  - Include patient/family/caregiver in decisions related to nutrition   Outcome: Progressing      Problem: SAFETY, RESTRAINT - VIOLENT/SELF-DESTRUCTIVE  Goal: Remains free of harm/injury from restraints (Restraint for Violent/Self-Destructive Behavior)  INTERVENTIONS:  - Instruct patient/family regarding restraint use   - Assess and monitor physiologic and psychological status   - Provide interventions and comfort measures to meet assessed patient needs   - Ensure continuous in person monitoring is provided   - Identify and implement measures to help patient regain control  - Assess readiness for release of restraint   Outcome: Progressing    Goal: Returns to optimal restraint-free functioning  INTERVENTIONS:  - Assess the patient's behavior and symptoms that indicate continued need for restraint  - Identify and implement measures to help patient regain control  - Assess readiness for release of restraint    Outcome: Progressing      Problem: Risk for Self Injury/Neglect  Goal: Treatment Goal: Remain safe during length of stay, learn and adopt new coping skills, and be free of self-injurious ideation, impulses and acts at the time of discharge  Outcome: Progressing    Goal: Verbalize thoughts and feelings  Interventions:  - Assess and re-assess patient's lethality and potential for self-injury  - Engage patient in 1:1 interactions, daily, for a minimum of 15 minutes  - Encourage patient to express feelings, fears, frustrations, hopes  - Establish rapport/trust with patient    Outcome: Progressing    Goal: Refrain from harming self  Interventions:  - Monitor patient closely, per order  - Develop a trusting relationship  - Supervise medication ingestion, monitor effects and side effects    Outcome: Progressing    Goal: Recognize maladaptive responses and adopt new coping mechanisms  Outcome: Progressing      Problem: Risk for Violence/Aggression Toward Others  Goal: Treatment Goal: Refrain from acts of violence/aggression during length of stay, and demonstrate improved impulse control at the time of discharge  Outcome: Progressing    Goal: Verbalize thoughts and feelings  Interventions:  - Assess and re-assess patient's level of risk, every waking shift  - Engage patient in 1:1 interactions, daily, for a minimum of 15 minutes   - Allow patient to express feelings and frustrations in a safe and non-threatening manner   - Establish rapport/trust with patient    Outcome: Progressing    Goal: Refrain from harming others  Outcome: Progressing    Goal: Refrain from destructive acts on the environment or property  Outcome: Progressing    Goal: Control angry outbursts  Interventions:  - Monitor patient closely, per order  - Ensure early verbal de-escalation  - Monitor prn medication needs  - Set reasonable/therapeutic limits, outline behavioral expectations, and consequences   - Provide a non-threatening milieu, utilizing the least restrictive interventions    Outcome: Progressing    Goal: Identify appropriate positive anger management techniques  Interventions:  - Offer anger management and coping skills groups   - Staff will provide positive feedback for appropriate anger control   Outcome: Progressing      Problem: DISCHARGE PLANNING  Goal: Discharge to home or other facility with appropriate resources  INTERVENTIONS:  - Identify barriers to discharge w/patient and caregiver  - Arrange for needed discharge resources and transportation as appropriate  - Identify discharge learning needs (meds, wound care, etc )  - Arrange for interpretive services to assist at discharge as needed  - Refer to Case Management Department for coordinating discharge planning if the patient needs post-hospital services based on physician/advanced practitioner order or complex needs related to functional status, cognitive ability, or social support system   Outcome: Progressing      Problem: Ineffective Coping  Goal: Participates in unit activities  Interventions:  - Provide therapeutic environment   - Provide required programming   - Redirect inappropriate behaviors    Outcome: Progressing

## 2018-09-12 NOTE — RESTRAINT FACE TO FACE
Restraint Face to Face   Usha Mendenhall 56 y o  female MRN: [id number]  Unit/Bed#: VP6 764-02 Encounter: [id number]      Physical Evaluation: agitated, hitting head against the wall, threatening to harm self  Purpose for Restraints/ Seclusion High risk for self harm and High risk for causing significant disruption of treatment environment   Patient's reaction to the intervention: does not respond to redirection  Patient's medical condition: stable  Patient's Behavioral condition: agitated, at risk to harm self  Restraints to be Continued

## 2018-09-12 NOTE — PLAN OF CARE
Problem: Nutrition/Hydration-ADULT  Goal: Nutrient/Hydration intake appropriate for improving, restoring or maintaining nutritional needs  Monitor and assess patient's nutrition/hydration status for malnutrition (ex- brittle hair, bruises, dry skin, pale skin and conjunctiva, muscle wasting, smooth red tongue, and disorientation)  Collaborate with interdisciplinary team and initiate plan and interventions as ordered  Monitor patient's weight and dietary intake as ordered or per policy  Utilize nutrition screening tool and intervene per policy  Determine patient's food preferences and provide high-protein, high-caloric foods as appropriate       INTERVENTIONS:  - Monitor oral intake, urinary output, labs, and treatment plans  - Assess nutrition and hydration status and recommend course of action  - Evaluate amount of meals eaten  - Assist patient with eating if necessary   - Allow adequate time for meals  - Recommend/ encourage appropriate diets, oral nutritional supplements, and vitamin/mineral supplements  - Order, calculate, and assess calorie counts as needed  - Recommend, monitor, and adjust tube feedings and TPN/PPN based on assessed needs  - Assess need for intravenous fluids  - Provide specific nutrition/hydration education as appropriate  - Include patient/family/caregiver in decisions related to nutrition   Outcome: Progressing      Problem: SAFETY, RESTRAINT - VIOLENT/SELF-DESTRUCTIVE  Goal: Remains free of harm/injury from restraints (Restraint for Violent/Self-Destructive Behavior)  INTERVENTIONS:  - Instruct patient/family regarding restraint use   - Assess and monitor physiologic and psychological status   - Provide interventions and comfort measures to meet assessed patient needs   - Ensure continuous in person monitoring is provided   - Identify and implement measures to help patient regain control  - Assess readiness for release of restraint   Outcome: Not Progressing    Goal: Returns to optimal restraint-free functioning  INTERVENTIONS:  - Assess the patient's behavior and symptoms that indicate continued need for restraint  - Identify and implement measures to help patient regain control  - Assess readiness for release of restraint    Outcome: Not Progressing      Problem: Risk for Self Injury/Neglect  Goal: Treatment Goal: Remain safe during length of stay, learn and adopt new coping skills, and be free of self-injurious ideation, impulses and acts at the time of discharge  Outcome: Not Progressing    Goal: Verbalize thoughts and feelings  Interventions:  - Assess and re-assess patient's lethality and potential for self-injury  - Engage patient in 1:1 interactions, daily, for a minimum of 15 minutes  - Encourage patient to express feelings, fears, frustrations, hopes  - Establish rapport/trust with patient    Outcome: Not Progressing    Goal: Refrain from harming self  Interventions:  - Monitor patient closely, per order  - Develop a trusting relationship  - Supervise medication ingestion, monitor effects and side effects    Outcome: Not Progressing    Goal: Recognize maladaptive responses and adopt new coping mechanisms  Outcome: Not Progressing      Problem: Risk for Violence/Aggression Toward Others  Goal: Treatment Goal: Refrain from acts of violence/aggression during length of stay, and demonstrate improved impulse control at the time of discharge  Outcome: Not Progressing    Goal: Verbalize thoughts and feelings  Interventions:  - Assess and re-assess patient's level of risk, every waking shift  - Engage patient in 1:1 interactions, daily, for a minimum of 15 minutes   - Allow patient to express feelings and frustrations in a safe and non-threatening manner   - Establish rapport/trust with patient    Outcome: Not Progressing    Goal: Refrain from harming others  Outcome: Progressing    Goal: Refrain from destructive acts on the environment or property  Outcome: Progressing    Goal: Control angry outbursts  Interventions:  - Monitor patient closely, per order  - Ensure early verbal de-escalation  - Monitor prn medication needs  - Set reasonable/therapeutic limits, outline behavioral expectations, and consequences   - Provide a non-threatening milieu, utilizing the least restrictive interventions    Outcome: Not Progressing    Goal: Identify appropriate positive anger management techniques  Interventions:  - Offer anger management and coping skills groups   - Staff will provide positive feedback for appropriate anger control   Outcome: Progressing      Problem: DISCHARGE PLANNING  Goal: Discharge to home or other facility with appropriate resources  INTERVENTIONS:  - Identify barriers to discharge w/patient and caregiver  - Arrange for needed discharge resources and transportation as appropriate  - Identify discharge learning needs (meds, wound care, etc )  - Arrange for interpretive services to assist at discharge as needed  - Refer to Case Management Department for coordinating discharge planning if the patient needs post-hospital services based on physician/advanced practitioner order or complex needs related to functional status, cognitive ability, or social support system   Outcome: Progressing      Problem: Ineffective Coping  Goal: Participates in unit activities  Interventions:  - Provide therapeutic environment   - Provide required programming   - Redirect inappropriate behaviors    Outcome: Not Progressing

## 2018-09-12 NOTE — H&P
Psychiatric Evaluation - Behavioral Health     Identification Data:Blanca Ramos Pretty 56 y o  female MRN: [id number]  Unit/Bed#: GT8 758-01 Encounter: [id number]    Chief Complaint: "I thought I was losing my mind", "I don't want to go on living like this", depression, anxiety, suicidal ideation and suicide attempt    History of Present Illness     Sima Becerril is a 56 y o  female with a history of adjustment disorder, bipolar disorder versus schizoaffective disorder, psychosis and anxiety who was admitted to the inpatient psychiatric unit on a voluntary 201 commitment basis due to depression, anxiety, mixed symptoms of bipolar disorder and inability to care for self because of mental illness  Symptoms prior to admission included worsening depression, depression, feeling depressed, suicidal behavior, suicidal ideation and feeling suicidal  Onset of symptoms was abrupt starting several days ago with rapidly worsening course since that time  Stressors preceding admission included medical problems, limited support, social difficulties, everyday stressors and ongoing anxiety  On initial evaluation after admission to the inpatient psychiatric unit the patient tense, but calm, felt sleepy however at times was fully alert when discussed her medications  The the patient was not acutely agitated  Stated that she had suicidal thoughts, but did not endorse any active plan of killing herself  The patient had multiple inpatient treatment because of suicidal ideations the the in the last 27 years since age 14 stated that she had at least 10 suicidal attempts      Psychiatric Review Of Systems:    sleep changes: decreased  appetite changes: decreased  energy/anergy: decreased  interest/pleasure/anhedonia: {EFO Yes/No/  anxiety/panic: yes  geovany: past mixed episodes  self injurious behavior/risky behavior: yes  Suicidal ideation: yes, no plan  Homicidal ideation: no  Auditory hallucinations: no  Visual hallucinations: no  Delusional thinking: no      Historical Information     Past Psychiatric History:     Past Inpatient Psychiatric Treatment:   Several past inpatient psychiatric admissions  Past Outpatient Psychiatric Treatment:    Was in outpatient psychiatric treatment in the past with a psychiatrist   Past Suicide Attempts:    yes, multiple attempts by overdose on medications, cutting self and suffocation  Past Violent Behavior:    no  Past Psychiatric Medication Trials:    multiple psychiatric medication trials     Substance Abuse History:  Social History     Tobacco History     Smoking Status  Former Smoker Quit date  1/1/2014 Smoking Tobacco Type  Cigarettes    Smokeless Tobacco Use  Never Used    Tobacco Comment  3 packs per day          Alcohol History     Alcohol Use Status  No          Drug Use     Drug Use Status  Yes Types  Marijuana Comment  drug overdose          Sexual Activity     Sexually Active  Not Asked          Activities of Daily Living    Not Asked               Additional Substance Use Detail     Questions Responses    Cannabis frequency 3 or more times/week    Comment: 3 or more times/week on 9/12/2018     Heroin Frequency Denies use in past 12 months    Cannabis method Smoke    Comment: Smoke on 9/12/2018     Cannabis last use 9/11/18    Comment: 9/11/2018 on 9/12/2018     Crack Cocaine Frequency Denies use in past 12 months    Methamphetamine Frequency Experimented    Methamphetamine Method Snort    Methamphetamine Last Use & Amount 2 weeks ago    Narcotic Frequency Denies use in past 12 months    Benzodiazepine Frequency Denies use in past 12 months    Amphetamine frequency Denies use in past 12 months    Barbituate Frequency Denies use use in past 12 months    Opiate frequency Denies use in past 12 months    Last reviewed by Vane Contreras RN on 9/12/2018        I have assessed this patient for substance use within the past 12 months    Alcohol use: denies current use  Recreational drug use: Cocaine:  denies use  Heroin:  denies use  Marijuana:  denies use  Other drugs: denies use   History of Inpatient/Outpatient rehabilitation program: no  Smoking history: occasionally    Family Psychiatric History:     Psychiatric Illness:  unable to obtain  Substance Abuse:  unable to obtain  Suicide Attempts:  patient denies    Social History:    Education: high school diploma/GED  Marital History: single    Traumatic History:     Abuse: no history of sexual abuse    Past Medical History:    History of Seizures: no  History of Head injury with loss of consciousness: no    Past Medical History:   Diagnosis Date    Anxiety     Arthritis     oa cassandra knees    Asthma     good control    Yan's esophagus     Bipolar affective disorder ([address] )     Cellulitis of abdominal wall     Chronic pain disorder     lumbar    CPAP (continuous positive airway pressure) dependence     Degenerative disc disease at L5-S1 level     Depression 09/16/2008    Disease of thyroid gland     hypo    MARTINEZ (dyspnea on exertion)     Drug overdose 10/28/2008    suicide attempt    Dyspepsia     chronic    Dysphagia     Dysphagia     Dyspnea     Edema     GERD (gastroesophageal reflux disease)     High blood pressure     Migraines     Overactive bladder     Rectal bleeding     [address]ogren's disease [facility] [address]     Sleep apnea     Stress incontinence     Suicidal ideations     Umbilical hernia      Past Surgical History:   Procedure Laterality Date    BREAST LUMPECTOMY Right     reduction    CARPAL TUNNEL RELEASE      left    CHOLECYSTECTOMY      5/2003    COLONOSCOPY      01/12/2009    DILATION AND CURETTAGE OF UTERUS      ELBOW SURGERY Left     ESOPHAGOGASTRODUODENOSCOPY      HYSTERECTOMY      laporoscopic, partial    KNEE ARTHROSCOPY      right    LAPAROSCOPIC HYSTERECTOMY      ID ANAL SPHINCTEROTOMY N/A 8/31/2018    Procedure: EUA, LEFT PARTIAL INTERNAL SPHINCTEROTOMY;  Surgeon: Aisha Alcantar MD;  Location:  MAIN OR; Service: Colorectal    REPAIR LACERATION Left     left hand-5/18/2009    TONSILECTOMY AND ADNOIDECTOMY      TONSILLECTOMY      VEIN LIGATION Bilateral        Medical Review Of Systems:    Review of systems not obtained due to patient factors  Allergies: Allergies   Allergen Reactions    No Active Allergies        Medications: All current active medications have been reviewed  Objective     Vital signs in last 24 hours:    Temp:  [97 7 °F (36 5 °C)-98 °F (36 7 °C)] 97 7 °F (36 5 °C)  HR:  [60-74] 74  Resp:  [16-18] 16  BP: (138-165)/(60-80) 138/60    No intake or output data in the 24 hours ending 09/12/18 1200     Mental Status Evaluation:      Appearance:  dressed in hospital attire   Behavior:  calm, guarded, uncooperative, slow responses   Mood:  depressed, dysphoric, anxious   Affect: constricted    Speech:  decreased rate, slow, increased latency of response   Language: appropriate   Thought Process:  concrete   Associations: concrete associations   Thought Content:  negative thinking, poverty of thought   Perceptual Disturbances: no auditory hallucinations, no visual hallucinations, denies auditory hallucinations when asked, does not appear responding to internal stimuli   Risk Potential: Suicidal ideation - Yes, without plan  Homicidal ideation - None  Potential for aggression - Not at present   Sensorium:  unable to assess   Memory:  recent and remote memory grossly intact   Consciousness:  alert and awake   Attention: decreased concentration   Intellect: not examined   Fund of Knowledge: past history: limited   Insight:  significantly impaired   Judgment: impaired   Muscle Tone: normal   Gait/Station: normal gait/station and normal balance   Motor Activity: no abnormal movements               Laboratory Results:   I have personally reviewed all pertinent laboratory/tests results    Most Recent Labs:   Lab Results   Component Value Date     09/06/2018    CO2 31 09/06/2018    BUN 7 09/06/2018    CREATININE 0 55 09/06/2018    GLUC 80 09/06/2018    CALCIUM 9 5 09/06/2018       Imaging Studies: Mammo Screening Bilateral W Cad    Result Date: 8/17/2018  Narrative: Patient History: Patient is of [address] descent and is nulliparous  Family history of colorectal cancer at age 48 or over in paternal uncle, colorectal cancer at age 48 or over in maternal uncle  Reason for exam: screening, asymptomatic  Screening Mammo Screening Bilateral W CAD: August 14, 2018 - Check In #: [de-identified] Bilateral CC and MLO view(s) were taken  Technologist: Larissa Chapa BD Prior study comparison: April 6, 2017, bilateral review of outside study, performed at [place] [address]  August 5, 2015, bilateral review of outside study, performed at [place] [address]  August 1, 2014, bilateral review of outside study, performed at [place] [address]  March 6, 2013, bilateral review of outside study, performed at [place] [address]  December 23, 2011, bilateral review of outside study, performed at [place] [address]  The breast tissue is almost entirely fat  No dominant soft tissue mass, architectural distortion or suspicious calcifications are noted  The skin and nipple contours are within normal limits  No evidence of malignancy  No significant change when compared to the prior studies  IMPRESSION:1  No evidence of malignancy  2   No significant change when compared with the prior study  ACR BI-RADS® Assessments: BiRad:1 - Negative Recommendation: Routine screening mammogram of both breasts in 1 year  Analyzed by CAD The patient is scheduled in a reminder system for screening mammography  8-10% of cancers will be missed on mammography  Management of a palpable abnormality must be based on clinical grounds  Patients will be notified of their results via letter from our facility  Accredited by [employer] of Radiology and FDA  Transcription Location: [address] [place], [place] [address]way: [id number] Risk Value(s): Tyrer-Cuzick 10 Year: 2 500%, Tyrer-Cuzick Lifetime: 12 900%, Myriad Table: 8 2%, JERRY 5 Year: 0 9%, NCI Lifetime: 9 6%      Code Status: Full    Assessment/Plan   Principal Problem:    Schizoaffective disorder, bipolar type ([address] )      Patient Strengths: negotiates basic needs, patient is on a voluntary commitment     Patient Barriers: difficulty adapting, patient is unwilling to work on problems, poor insight, poor interpersonal skills, poor past treatment response    Treatment Plan:     Planned Treatment and Medication Changes: All current active medications have been reviewed  Encourage group therapy, milieu therapy and occupational therapy  Behavioral Health checks every 5 minutes  Restart patient's medications after discussion them with patient increase topiramate as a medication that may be quite helpful to help the patient with treatment resistant bipolar disorder, complicated by in tolerable obesity  The restart CPAP treatment      Current Facility-Administered Medications:  acetaminophen 650 mg Oral Q6H PRN Holland Myers MD   aluminum-magnesium hydroxide-simethicone 30 mL Oral Q4H PRN Holland Myers MD   amLODIPine 10 mg Oral HS Mt Spence MD   benztropine 1 mg Intramuscular Q1H PRN [place] Nguyễn MD   benztropine 1 mg Oral Q1H PRN [place] Nguyễn MD   buPROPion 200 mg Oral BID Mt Spence MD   busPIRone 15 mg Oral TID Mt Spence MD   estradiol 1 mg Oral Daily Mt Spence MD   furosemide 20 mg Oral Daily Mt Spence MD   haloperidol lactate 5 mg Intramuscular Q4H PRN Holland Myers MD   hydrOXYzine HCL 25 mg Oral Q4H PRN Holland Myers MD   ibuprofen 400 mg Oral Q8H PRN Holland Myers MD   ibuprofen 600 mg Oral Q8H PRN Holland Myers MD   ibuprofen 800 mg Oral Q8H PRN [place] Nguyễn MD   levothyroxine 250 mcg Oral Before Jasmyn Rosales MD   LORazepam 2 mg Intramuscular Q4H PRDEBORAH Luu MD   losartan 50 mg Oral Daily Jessica Angelo MD   lurasidone 100 mg Oral Daily With Kaylyn Leone MD   magnesium hydroxide 30 mL Oral Daily PRN Sumanth Luu MD   metoprolol tartrate 50 mg Oral Q12H [facility] Jessica Angelo MD   OLANZapine 5 mg Intramuscular Q3H PRDEBORAH Luu MD   pantoprazole 40 mg Oral BID AC Jessica Angelo MD   pilocarpine 5 mg Oral TID Jessica Angelo MD   potassium chloride 20 mEq Oral Daily Jessica Angelo MD   risperiDONE 1 mg Oral Q3H PRDEBORAH Luu MD   tiZANidine 4 mg Oral TID PRDEBORAH Angelo MD   topiramate 25 mg Oral BID Jessica Angelo MD   traZODone 200 mg Oral HS Jessica Angelo MD   traZODone 50 mg Oral HS PRDEBORAH Luu MD       Risks / Benefits of Treatment:    Risks, benefits, and possible side effects of medications explained to patient  Patient has limited understanding of risks and benefits of treatment at this time, but agrees to take medications as prescribed  Counseling / Coordination of Care:    Patient's presentation on admission and proposed treatment plan discussed with treatment team     Inpatient Psychiatric Certification:    Estimated length of stay: 7 midnights    Based upon physical, mental and social evaluations, I certify that inpatient psychiatric services are medically necessary for this patient for a duration of 7 midnights for the treatment of Schizoaffective disorder, bipolar type [facility])    Available alternative community resources do not meet the patient's mental health care needs  I further attest that an established written individualized plan of care has been implemented and is outlined in the patient's medical records  ** Please Note: This note has been constructed using a voice recognition system   **

## 2018-09-12 NOTE — PROGRESS NOTES
Nursing was called to patients room due to her banging her head against the wall  She stated that she was upset over her roommate going through her things  However, instead of calling staff she decided to self injury  During that time she also stated that she was going to do whatever she could to hurt herself  It was noted last night that she stated that she would like to be on a one-to-one  Due to her behaviors and her inability to contract for safety she was given her PRN of Cogentin, Haldol, and Ativan  Patient is still uncooperative and states that if she is left alone she will hurt herself  Nursing will continue to work with patient to help insure her safety
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
## 2018-09-12 NOTE — PROGRESS NOTES
Patient in control at this time  RN spoke with patient about her behaviors which led her to go into restraints  Apologizing to staff for her behavior  Restraints have been d/c

## 2018-09-12 NOTE — ED NOTES
Patient removed from all 4 locked restraints per verbal order by Dr Noman Vegas  Stated to monitor patient for 10-15 minutes        Elías Cortes RN  09/11/18 [id number]

## 2018-09-12 NOTE — PROGRESS NOTES
Patient c/o anxiety and agitation related to having to cancel several outpatient appointments she has  Given PRN of RIsperdal and Atarax  Will monitor

## 2018-09-12 NOTE — ED NOTES
Patient noted to be increasingly agitated and thrashing in bed  Patient yelling, "take these restraints off, this is fucking ridiculous  I'm a human being, too " Education provided to patient that once she is able to follow directions and meet further criteria for restraint removal they will be removed  Until then, more medication to be ordered per Dr Grey Yung  Security called to bedside for assistance        Jovnani Ramirez RN  09/11/18 [id number]

## 2018-09-12 NOTE — CONSULTS
Consultation Exam - Connie Zhang 56 y o  female MRN: [id number]    Unit/Bed#: NW7 758-01 Encounter: [id number]      Assessment/Plan     1  Abdominal Pain- Epigastric/Periumbilical  · Abdominal exam is benign, nontender to palpation throughout, normal bowel sounds  No rebound or guarding  · Patient has history of gastritis and gastroparesis per record review  Patient is aware of her diagnosis  Patient had EGD and Colonoscopy in February of 2018  · Patient reports she does not have abdominal exam at this time and her symptoms are chronic  · She denies any nausea, vomiting, bloody stools  · Avoid NSAIDs  · Non-Ulcerogenic Diet recommended  · Check CBC, lipase, TSH, and hemoglobin A1c  · No need for additional imaging at this time  · Continue pantoprazole 40 mg daily  Do not believe patient needs BID dosing at this time  · If symptoms do not improve consider adding Reglan as patient has documented Gastroparesis   · Patient also has ventral hernia that has been evaluated by surgery  Patient not a surgical candidate  2  Hypertension  · For continue regimen of amlodipine 10 mg daily, furosemide 20 mg daily, Lopressor 50 mg b i d , and losartan 50 mg daily  · Would not make any changes at this time  3  Edema  · Mild trace bilateral lower extremity edema  · No crackles, JVD, etc  · Would check UA for proteinuria  Patient has history of proteinuria  · Outpatient echocardiogram    4  Urinary Incontinence with Overactive Bladder   · Recent UTI in July, would check UA   · Patient follows regularly with Urogynecology   · Has been off her Mcintosh Hoop for GI issues  5  Hypothyroidism   · Check TSH and Free T4       Primary Care Physician: JHONATAN Palomares  Admitting Provider: Sina Osman MD    Chief complaint:" I dont feel like myself"    History of Present Illness   History, ROS and PFSH limited secondary to patient being sedated  Isak Ashton   HPI:  Connie Zhang is a 56 y o  female with significant past medical history of hypothyroidism, hypertension, gastroparesis, gastritis, obstructive sleep apnea, and severe bipolar disorder who has been admitted for suicidal ideation  Per record review patient has significant history of abdominal pain with diagnosed gastroparesis and gastritis  Patient reports she has not been taking any of her PPIs  Patient reports she was not on experiencing any new symptoms or signs  She has not had any weight loss or bloody stools  Patient also reports she has been dealing with urinary incontinence for a long time and had been off her medication of which the name she cannot remember  Patient currently does not smoke cigarettes or consume alcohol  Patient regularly smokes marijuana  Review of Systems   Reason unable to perform ROS: limited secondary to patient receiving sedating medications  Gastrointestinal: Positive for abdominal pain  Psychiatric/Behavioral: Positive for confusion, decreased concentration and dysphoric mood         Historical Information   Past Medical History:   Diagnosis Date    Anxiety     Arthritis     oa cassandra knees    Asthma     good control    Yan's esophagus     Bipolar affective disorder ([address] )     Cellulitis of abdominal wall     Chronic pain disorder     lumbar    CPAP (continuous positive airway pressure) dependence     Degenerative disc disease at L5-S1 level     Depression 09/16/2008    Disease of thyroid gland     hypo    MARTINEZ (dyspnea on exertion)     Drug overdose 10/28/2008    suicide attempt    Dyspepsia     chronic    Dysphagia     Dysphagia     Dyspnea     Edema     GERD (gastroesophageal reflux disease)     High blood pressure     Migraines     Overactive bladder     Rectal bleeding     Sjogren's disease ([address] )     Sleep apnea     Stress incontinence     Suicidal ideations     Umbilical hernia      Past Surgical History:   Procedure Laterality Date    BREAST LUMPECTOMY Right     reduction    CARPAL TUNNEL RELEASE left    CHOLECYSTECTOMY      5/2003    COLONOSCOPY      01/12/2009    DILATION AND CURETTAGE OF UTERUS      ELBOW SURGERY Left     ESOPHAGOGASTRODUODENOSCOPY      HYSTERECTOMY      laporoscopic, partial    KNEE ARTHROSCOPY      right    LAPAROSCOPIC HYSTERECTOMY      MA ANAL SPHINCTEROTOMY N/A 8/31/2018    Procedure: EUA, LEFT PARTIAL INTERNAL SPHINCTEROTOMY;  Surgeon: Radha Gonzalez MD;  Location: [address] [place] [address] OR;  Service: Colorectal    REPAIR LACERATION Left     left hand-5/18/2009    TONSILECTOMY AND ADNOIDECTOMY      TONSILLECTOMY      VEIN LIGATION Bilateral      Social History   History   Alcohol Use No     History   Drug Use    Types: Marijuana     Comment: drug overdose     History   Smoking Status    Former Smoker    Types: Cigarettes    Quit date: 1/1/2014   Smokeless Tobacco    Never Used     Comment: 3 packs per day     Family History:   Family History   Problem Relation Age of Onset    Kidney cancer Mother     Diabetes Mother     Colon cancer Family        Meds/Allergies   PTA meds:   Prior to Admission Medications   Prescriptions Last Dose Informant Patient Reported? Taking?    amLODIPine (NORVASC) 10 mg tablet  Self No Yes   Sig: Take 1 tablet (10 mg total) by mouth daily   Patient taking differently: Take 10 mg by mouth daily at bedtime     buPROPion (WELLBUTRIN SR) 200 MG 12 hr tablet  Self Yes Yes   Sig: Take 200 mg by mouth 2 (two) times a day   busPIRone (BUSPAR) 30 MG tablet  Self Yes No   Sig: Take 15 mg by mouth 3 (three) times a day     estradiol (ESTRACE) 1 mg tablet  Self Yes Yes   Sig: Take 1 mg by mouth daily   furosemide (LASIX) 20 mg tablet 9/11/2018 at Unknown time Self Yes Yes   Sig: Take 20 mg by mouth daily     levothyroxine 50 mcg tablet 9/11/2018 at Unknown time Self No Yes   Sig: Take 1 tablet (50 mcg total) by mouth daily   Patient taking differently: Take 250 mcg by mouth daily     losartan (COZAAR) 50 mg tablet 9/11/2018 at Unknown time Self Yes Yes   Sig: Take 50 mg by mouth daily     lurasidone (LATUDA) 80 mg tablet 9/11/2018 at Unknown time Self Yes Yes   Sig: Take 100 mg by mouth daily with breakfast     metoprolol tartrate (LOPRESSOR) 50 mg tablet 9/11/2018 at Unknown time Self No Yes   Sig: Take 1 tablet (50 mg total) by mouth every 12 (twelve) hours   omeprazole (PriLOSEC) 40 MG capsule 9/11/2018 at Unknown time Self Yes Yes   Sig: Take 40 mg by mouth 2 (two) times a day     pilocarpine (SALAGEN) 5 mg tablet   Yes Yes   Sig: Take 5 mg by mouth 3 (three) times a day   potassium chloride (K-DUR,KLOR-CON) 20 mEq tablet  Self Yes Yes   Sig: Take 20 mEq by mouth daily     tiZANidine (ZANAFLEX) 4 mg tablet  Self No Yes   Sig: Take 1 tablet (4 mg total) by mouth 3 (three) times a day as needed for muscle spasms   topiramate (TOPAMAX) 25 mg tablet   No Yes   Sig: Take 1 tablet (25 mg total) by mouth 2 (two) times a day   traZODone (DESYREL) 100 mg tablet  Self Yes Yes   Sig: Take 200 mg by mouth daily at bedtime      Facility-Administered Medications: None     Allergies   Allergen Reactions    No Active Allergies        Objective   Vitals: Blood pressure 141/65, pulse 78, temperature 97 7 °F (36 5 °C), temperature source Oral, resp  rate 16, height 5' 7" (1 702 m), weight (!) 138 kg (304 lb), SpO2 96 %  No intake or output data in the 24 hours ending 09/12/18 1618    Invasive Devices          No matching active lines, drains, or airways          Physical Exam   Constitutional: She is oriented to person, place, and time  Vital signs are normal  She appears well-developed and well-nourished  She appears lethargic  No distress  Looks older than stated age   HENT:   Head: Normocephalic and atraumatic  Mouth/Throat: No oropharyngeal exudate  Eyes: Conjunctivae are normal  Pupils are equal, round, and reactive to light  No scleral icterus  Neck: No JVD present  Cardiovascular: Normal rate, regular rhythm, normal heart sounds and intact distal pulses    Exam reveals no gallop and no friction rub  No murmur heard  Pulmonary/Chest: Effort normal and breath sounds normal  No respiratory distress  She has no wheezes  She has no rales  She exhibits no tenderness  Abdominal: Soft  Bowel sounds are normal  She exhibits distension (obese)  There is no tenderness  There is no rebound and no guarding  A hernia is present  Hernia confirmed positive in the ventral area  Musculoskeletal: She exhibits edema (mild trace bilateral lower extremity edema)  She exhibits no tenderness  Lymphadenopathy:     She has no cervical adenopathy  Neurological: She is oriented to person, place, and time  She appears lethargic  Skin: Skin is warm and dry  She is not diaphoretic  No erythema  Face dark pigmentation  Wrists covered in gauze dressing   Psychiatric: Her speech is delayed  She is slowed  She exhibits a depressed mood  Lab Results: I have personally reviewed pertinent reports  CBC:   , Chemistry Profile:   Results from last 7 days  Lab Units 09/06/18  0957   CHLORIDE mmol/L 100   CO2 mmol/L 31   BUN mg/dL 7   CREATININE mg/dL 0 55   SL AMB CALCIUM mg/dL 9 5   , Coagulation Studies:   , Cardiac Studies:   , Additional Labs:   , iSTAT CHEM 8:       Invalid input(s): POTASSIUMIS, ABG:   , Toxicology:   Results from last 7 days  Lab Units 09/11/18  1503   BARBITURATE UR  Negative   BENZODIAZEPINE UR  Negative   COCAINE UR  Negative   OPIATE UR  Positive*   PCP UR  Negative   THC UR  Positive*   , Last A1C/Lipid Panel/Thyroid Panel: No results found for: HGBA1C, TRIG, CHOL, HDL, LDLCALC, [id number], T3FREE, U3DZUED, FREET4    Imaging: I have personally reviewed pertinent reports  Mammo Screening Bilateral W Cad    Result Date: 8/17/2018  Narrative: Patient History: Patient is of [address] descent and is nulliparous  Family history of colorectal cancer at age 48 or over in paternal uncle, colorectal cancer at age 48 or over in maternal uncle   Reason for exam: screening, asymptomatic  Screening Mammo Screening Bilateral W CAD: August 14, 2018 - Check In #: [de-identified] Bilateral CC and MLO view(s) were taken  Technologist: Brittaney Limon, JUNIOR Ceja Prior study comparison: April 6, 2017, bilateral review of outside study, performed at [place] [address]  August 5, 2015, bilateral review of outside study, performed at [place] [address]  August 1, 2014, bilateral review of outside study, performed at [place] [address]  March 6, 2013, bilateral review of outside study, performed at [place] [address]  December 23, 2011, bilateral review of outside study, performed at [place] [address]  The breast tissue is almost entirely fat  No dominant soft tissue mass, architectural distortion or suspicious calcifications are noted  The skin and nipple contours are within normal limits  No evidence of malignancy  No significant change when compared to the prior studies  IMPRESSION:1  No evidence of malignancy  2   No significant change when compared with the prior study  ACR BI-RADS® Assessments: BiRad:1 - Negative Recommendation: Routine screening mammogram of both breasts in 1 year  Analyzed by CAD The patient is scheduled in a reminder system for screening mammography  8-10% of cancers will be missed on mammography  Management of a palpable abnormality must be based on clinical grounds  Patients will be notified of their results via letter from our facility  Accredited by [employer] of [employer] and FDA  Transcription Location: [address] [place] [address]: [id number] Risk Value(s): Tyrer-Cuzick 10 Year: 2 500%, Tyrer-Cuzick Lifetime: 12 900%, Myriad Table: 8 2%, JERRY 5 Year: 0 9%, NCI Lifetime: 9 6%      EKG, Pathology, and Other Studies: I have personally reviewed pertinent reports        Aleena Fan MD  Internal Medicine  PGY-2

## 2018-09-12 NOTE — PROGRESS NOTES
Pt arrived to unit calm and in control of her behaviors  Pt stated she did not eat in ED and requested something to eat  Pt provided with cereal, milk, juice and snacks and is eating in pt lounge

## 2018-09-12 NOTE — ED NOTES
Patient thrashing around in bed and got out of 1 locked restraint  Security called to bedside for re-application       Amadeo Mackenzie RN  09/11/18 2000

## 2018-09-13 LAB
BASOPHILS # BLD AUTO: 0.04 THOUSANDS/ΜL (ref 0–0.1)
BASOPHILS NFR BLD AUTO: 1 % (ref 0–1)
BILIRUB UR QL STRIP: NEGATIVE
CLARITY UR: NORMAL
COLOR UR: NORMAL
EOSINOPHIL # BLD AUTO: 0.08 THOUSAND/ΜL (ref 0–0.61)
EOSINOPHIL NFR BLD AUTO: 1 % (ref 0–6)
ERYTHROCYTE [DISTWIDTH] IN BLOOD BY AUTOMATED COUNT: 15.8 % (ref 11.6–15.1)
EST. AVERAGE GLUCOSE BLD GHB EST-MCNC: 126 MG/DL
GLUCOSE UR STRIP-MCNC: NEGATIVE MG/DL
HBA1C MFR BLD: 6 % (ref 4.2–6.3)
HCT VFR BLD AUTO: 41.1 % (ref 34.8–46.1)
HGB BLD-MCNC: 13.2 G/DL (ref 11.5–15.4)
HGB UR QL STRIP.AUTO: NEGATIVE
IMM GRANULOCYTES # BLD AUTO: 0.05 THOUSAND/UL (ref 0–0.2)
IMM GRANULOCYTES NFR BLD AUTO: 1 % (ref 0–2)
KETONES UR STRIP-MCNC: NEGATIVE MG/DL
LEUKOCYTE ESTERASE UR QL STRIP: NEGATIVE
LIPASE SERPL-CCNC: 71 U/L (ref 73–393)
LYMPHOCYTES # BLD AUTO: 2.5 THOUSANDS/ΜL (ref 0.6–4.47)
LYMPHOCYTES NFR BLD AUTO: 28 % (ref 14–44)
MCH RBC QN AUTO: 28.4 PG (ref 26.8–34.3)
MCHC RBC AUTO-ENTMCNC: 32.1 G/DL (ref 31.4–37.4)
MCV RBC AUTO: 89 FL (ref 82–98)
MONOCYTES # BLD AUTO: 0.85 THOUSAND/ΜL (ref 0.17–1.22)
MONOCYTES NFR BLD AUTO: 10 % (ref 4–12)
NEUTROPHILS # BLD AUTO: 5.31 THOUSANDS/ΜL (ref 1.85–7.62)
NEUTS SEG NFR BLD AUTO: 59 % (ref 43–75)
NITRITE UR QL STRIP: NEGATIVE
NRBC BLD AUTO-RTO: 0 /100 WBCS
PH UR STRIP.AUTO: 6.5 [PH] (ref 4.5–8)
PLATELET # BLD AUTO: 211 THOUSANDS/UL (ref 149–390)
PMV BLD AUTO: 11 FL (ref 8.9–12.7)
PROT UR STRIP-MCNC: NEGATIVE MG/DL
RBC # BLD AUTO: 4.64 MILLION/UL (ref 3.81–5.12)
SP GR UR STRIP.AUTO: 1.02 (ref 1–1.03)
TSH SERPL DL<=0.05 MIU/L-ACNC: 1.37 UIU/ML (ref 0.36–3.74)
UROBILINOGEN UR QL STRIP.AUTO: 0.2 E.U./DL
WBC # BLD AUTO: 8.83 THOUSAND/UL (ref 4.31–10.16)

## 2018-09-13 PROCEDURE — 83036 HEMOGLOBIN GLYCOSYLATED A1C: CPT | Performed by: INTERNAL MEDICINE

## 2018-09-13 PROCEDURE — 85025 COMPLETE CBC W/AUTO DIFF WBC: CPT | Performed by: INTERNAL MEDICINE

## 2018-09-13 PROCEDURE — 83690 ASSAY OF LIPASE: CPT | Performed by: INTERNAL MEDICINE

## 2018-09-13 PROCEDURE — 81003 URINALYSIS AUTO W/O SCOPE: CPT | Performed by: INTERNAL MEDICINE

## 2018-09-13 PROCEDURE — 84443 ASSAY THYROID STIM HORMONE: CPT | Performed by: INTERNAL MEDICINE

## 2018-09-13 RX ORDER — BUPROPION HYDROCHLORIDE 100 MG/1
100 TABLET, EXTENDED RELEASE ORAL 2 TIMES DAILY
Status: DISCONTINUED | OUTPATIENT
Start: 2018-09-13 | End: 2018-09-19 | Stop reason: HOSPADM

## 2018-09-13 RX ORDER — CHLORPROMAZINE HYDROCHLORIDE 25 MG/1
25 TABLET, FILM COATED ORAL 3 TIMES DAILY
Status: DISCONTINUED | OUTPATIENT
Start: 2018-09-13 | End: 2018-09-14

## 2018-09-13 RX ORDER — TOPIRAMATE 25 MG/1
50 TABLET ORAL DAILY
Status: DISCONTINUED | OUTPATIENT
Start: 2018-09-13 | End: 2018-09-19 | Stop reason: HOSPADM

## 2018-09-13 RX ADMIN — PANTOPRAZOLE SODIUM 40 MG: 40 TABLET, DELAYED RELEASE ORAL at 05:36

## 2018-09-13 RX ADMIN — FUROSEMIDE 20 MG: 20 TABLET ORAL at 09:05

## 2018-09-13 RX ADMIN — CHLORPROMAZINE HYDROCHLORIDE 25 MG: 25 TABLET, SUGAR COATED ORAL at 17:00

## 2018-09-13 RX ADMIN — CHLORPROMAZINE HYDROCHLORIDE 25 MG: 25 TABLET, SUGAR COATED ORAL at 09:09

## 2018-09-13 RX ADMIN — LOSARTAN POTASSIUM 50 MG: 50 TABLET ORAL at 09:05

## 2018-09-13 RX ADMIN — LURASIDONE HYDROCHLORIDE 120 MG: 80 TABLET, FILM COATED ORAL at 17:54

## 2018-09-13 RX ADMIN — LIDOCAINE 1 PATCH: 50 PATCH CUTANEOUS at 09:06

## 2018-09-13 RX ADMIN — LEVOTHYROXINE SODIUM 250 MCG: 125 TABLET ORAL at 17:00

## 2018-09-13 RX ADMIN — BUSPIRONE HYDROCHLORIDE 15 MG: 10 TABLET ORAL at 21:34

## 2018-09-13 RX ADMIN — ESTRADIOL 1 MG: 1 TABLET ORAL at 08:51

## 2018-09-13 RX ADMIN — BENZTROPINE MESYLATE 1 MG: 1 INJECTION INTRAMUSCULAR; INTRAVENOUS at 19:33

## 2018-09-13 RX ADMIN — PILOCARPINE HYDROCHLORIDE 5 MG: 5 TABLET, FILM COATED ORAL at 21:34

## 2018-09-13 RX ADMIN — HALOPERIDOL LACTATE 5 MG: 5 INJECTION, SOLUTION INTRAMUSCULAR at 19:33

## 2018-09-13 RX ADMIN — BUPROPION HYDROCHLORIDE 100 MG: 100 TABLET, FILM COATED, EXTENDED RELEASE ORAL at 08:48

## 2018-09-13 RX ADMIN — LORAZEPAM 2 MG: 1 TABLET ORAL at 08:49

## 2018-09-13 RX ADMIN — POTASSIUM CHLORIDE 20 MEQ: 1500 TABLET, EXTENDED RELEASE ORAL at 08:48

## 2018-09-13 RX ADMIN — BUSPIRONE HYDROCHLORIDE 15 MG: 10 TABLET ORAL at 17:00

## 2018-09-13 RX ADMIN — BUSPIRONE HYDROCHLORIDE 15 MG: 10 TABLET ORAL at 08:49

## 2018-09-13 RX ADMIN — LORAZEPAM 2 MG: 2 INJECTION INTRAMUSCULAR; INTRAVENOUS at 19:33

## 2018-09-13 RX ADMIN — TOPIRAMATE 50 MG: 25 TABLET, FILM COATED ORAL at 09:09

## 2018-09-13 RX ADMIN — IBUPROFEN 600 MG: 600 TABLET, FILM COATED ORAL at 05:59

## 2018-09-13 RX ADMIN — BUPROPION HYDROCHLORIDE 100 MG: 100 TABLET, FILM COATED, EXTENDED RELEASE ORAL at 17:04

## 2018-09-13 RX ADMIN — AMLODIPINE BESYLATE 10 MG: 10 TABLET ORAL at 21:35

## 2018-09-13 RX ADMIN — PILOCARPINE HYDROCHLORIDE 5 MG: 5 TABLET, FILM COATED ORAL at 17:00

## 2018-09-13 RX ADMIN — TOPIRAMATE 50 MG: 25 TABLET, FILM COATED ORAL at 17:04

## 2018-09-13 RX ADMIN — PILOCARPINE HYDROCHLORIDE 5 MG: 5 TABLET, FILM COATED ORAL at 09:05

## 2018-09-13 RX ADMIN — METOPROLOL TARTRATE 50 MG: 50 TABLET, FILM COATED ORAL at 21:35

## 2018-09-13 RX ADMIN — CHLORPROMAZINE HYDROCHLORIDE 25 MG: 25 TABLET, SUGAR COATED ORAL at 21:34

## 2018-09-13 RX ADMIN — TRAZODONE HYDROCHLORIDE 200 MG: 100 TABLET ORAL at 21:35

## 2018-09-13 NOTE — CASE MANAGEMENT
Spoke with pt about borderline personality features and issues and the typical effects it has upon a person's behaviors and their relationships and lifestyle in general  Pt tolerated conversation well and agreed with the characteristics of borderline personality disorder, especially self destructive behaviors, gaining benefits from negative behaviors and attention, extreme emotions and rage/anger issues  Pt's ICM Adonis Pierre was present for this discussion and we discussed pt engaging in DBT or more intense CBT with her outpt therapist to deal with these issues in a constructive manner  Adonis Pierre states she will look into getting pt a therapist who will work on therapy designed to be effective for borderline personality disorder  Pt does see her various ICM's frequently, she feels very close to them and does not want an ACT team and has utilized ACT in the past  [address] [phone], [facility] Crys Pop [phone]

## 2018-09-13 NOTE — TREATMENT PLAN
TREATMENT PLAN REVIEW - [address]  Rachel Parra [age] y o  1970 female MRN: [id number]    [address] [place] [address] Room / Bed: [place] [address]/[facility] 758-01 Encounter: [id number]          Admit Date/Time:  9/11/2018  2:42 PM    Treatment Team: Attending Provider: Jovanni Mejia MD; Consulting Physician: Olvin Yoon DO; Consulting Physician: Ryan Kebede MD; Patient Care Technician: Gayatri Mancini; Registered Nurse: Leticia Miranda, RN; Patient Care Assistant: Nighat Noguera; : Montrell Boone, GUS; Patient Care Technician: Clarita Almeida; Registered Nurse: Neris Stiles, RN; Patient Care Technician: Brain Oro;  Registered Nurse: Langston Baumgarten, RN    Diagnosis: Principal Problem:    Schizoaffective disorder, bipolar type [facility])    Patient Strengths: negotiates basic needs, patient is on a voluntary commitment     Patient Barriers: difficulty adapting, patient is unwilling to work on problems, poor insight, poor interpersonal skills, poor past treatment response    Short Term Goals: decrease in depressive symptoms, decrease in suicidal thoughts, decrease in frequency of self abusive behaviors, decrease in level of agitation, decrease in frequency of aggressive outbursts, ability to stay safe on the unit, ability to stay free from restraints    Long Term Goals: stabilization of mood, free of suicidal thoughts, improved reality testing, improved impulse control, no agitation on the unit, no aggressive behavior on the unit    Progress Towards Goals: starting psychitric medications as prescribed, continue psychiatric medications as prescribed    Recommended Treatment: medication management, patient medication education, group therapy, milieu therapy, continued Behavioral Health psychiatric evaluation/assessment process     Treatment Frequency: daily medication monitoring, group and milieu therapy daily, monitoring through interdisciplinary rounds, monitoring through weekly patient care conferences    Expected Discharge Date:  5-7 days    Discharge Plan: placement in personal care boarding home, referral to [address] [place] [address] Team for close psychiatric monitoring    Treatment Plan Created/Updated By: Ita Rosario MD

## 2018-09-13 NOTE — PROGRESS NOTES
Patient remains on continual observation  Was crying in her room this morning and would not speak to RN initially--kept her face covered with her hands and said she was upset because she hadn't been able to take a shower yet and her pants didn't fit properly  Initially refused vital signs but ultimately agreed and took morning medications, with the addition of 2 mg Ativan po  Per staff, patient was unhappy with her breakfast tray and threw the food around instead of eating it  Patient then was able to shower and change clothes and the Ativan was apparently effective for her as she was able to move around the unit and interact with staff in a calmer manner  Patient declined to answer RN questions about SI or HI

## 2018-09-13 NOTE — RESTRAINT FACE TO FACE
Restraint Face to Face   Jamila Bravo 56 y o  female MRN: [id number]  Unit/Bed#: NT5 758-01 Encounter: [id number]      Physical Evaluation  Constitutional: Pt appears well-nourished  No distress  Obsese  HENT:   Head: Normocephalic and atraumatic  Eyes: EOM are normal  Pupils are equal, round, and reactive to light  Neck: Normal range of motion  Neck supple  Cardiovascular: Normal rate and regular rhythm  Pulmonary/Chest: Effort normal and breath sounds normal  No respiratory distress  Abdominal: Soft  Pt exhibits no distension  There is no tenderness  Musculoskeletal: Normal range of motion  Patient in 4 point restraints  Agitated  Neurological: Pt is alert  Acting aggressive towards staff  Unwilling to follow commands  Skin: Skin is warm and dry  Pt is not diaphoretic  Vitals reviewed        Purpose for Restraints/ Seclusion High risk for self harm, High risk for causing significant disruption of treatment environment , High risk for harm to others and high risk for flight  Patient's reaction to the intervention: still agitated  Patient's medical condition stable  Patient's Behavioral condition stable  Restraints to be Continued

## 2018-09-13 NOTE — PROGRESS NOTES
Patient agitated and angry "I'm getting the fucking run around from these doctors   I've been on them all day that I needed that, I brought my own from home even because I knew I needed it "

## 2018-09-13 NOTE — PROGRESS NOTES
Pt was awake briefly a couple of times during the night  She made no attempts to self-harm  Maintained on constant observation close proximity throughout the night

## 2018-09-14 PROCEDURE — 99232 SBSQ HOSP IP/OBS MODERATE 35: CPT | Performed by: PSYCHIATRY & NEUROLOGY

## 2018-09-14 RX ORDER — GINSENG 100 MG
1 CAPSULE ORAL 2 TIMES DAILY
Status: DISCONTINUED | OUTPATIENT
Start: 2018-09-14 | End: 2018-09-19 | Stop reason: HOSPADM

## 2018-09-14 RX ORDER — CHLORPROMAZINE HYDROCHLORIDE 25 MG/1
25 TABLET, FILM COATED ORAL DAILY
Status: DISCONTINUED | OUTPATIENT
Start: 2018-09-15 | End: 2018-09-19 | Stop reason: HOSPADM

## 2018-09-14 RX ORDER — FLUCONAZOLE 150 MG/1
150 TABLET ORAL ONCE
Status: COMPLETED | OUTPATIENT
Start: 2018-09-14 | End: 2018-09-14

## 2018-09-14 RX ORDER — BUSPIRONE HYDROCHLORIDE 10 MG/1
30 TABLET ORAL 2 TIMES DAILY
Status: DISCONTINUED | OUTPATIENT
Start: 2018-09-14 | End: 2018-09-19 | Stop reason: HOSPADM

## 2018-09-14 RX ORDER — CHLORPROMAZINE HYDROCHLORIDE 50 MG/1
50 TABLET, FILM COATED ORAL 2 TIMES DAILY
Status: DISCONTINUED | OUTPATIENT
Start: 2018-09-14 | End: 2018-09-19 | Stop reason: HOSPADM

## 2018-09-14 RX ADMIN — PILOCARPINE HYDROCHLORIDE 5 MG: 5 TABLET, FILM COATED ORAL at 20:55

## 2018-09-14 RX ADMIN — BUSPIRONE HYDROCHLORIDE 30 MG: 10 TABLET ORAL at 18:07

## 2018-09-14 RX ADMIN — CHLORPROMAZINE HYDROCHLORIDE 25 MG: 25 TABLET, SUGAR COATED ORAL at 08:23

## 2018-09-14 RX ADMIN — CHLORPROMAZINE HYDROCHLORIDE 50 MG: 50 TABLET, SUGAR COATED ORAL at 16:27

## 2018-09-14 RX ADMIN — LIDOCAINE 1 PATCH: 50 PATCH CUTANEOUS at 08:23

## 2018-09-14 RX ADMIN — BACITRACIN ZINC 1 SMALL APPLICATION: 500 OINTMENT TOPICAL at 20:38

## 2018-09-14 RX ADMIN — LOSARTAN POTASSIUM 50 MG: 50 TABLET ORAL at 08:26

## 2018-09-14 RX ADMIN — HYDROXYZINE HYDROCHLORIDE 50 MG: 50 TABLET, FILM COATED ORAL at 20:29

## 2018-09-14 RX ADMIN — FUROSEMIDE 20 MG: 20 TABLET ORAL at 08:26

## 2018-09-14 RX ADMIN — METOPROLOL TARTRATE 50 MG: 50 TABLET, FILM COATED ORAL at 08:23

## 2018-09-14 RX ADMIN — TOPIRAMATE 50 MG: 25 TABLET, FILM COATED ORAL at 08:24

## 2018-09-14 RX ADMIN — BUPROPION HYDROCHLORIDE 100 MG: 100 TABLET, FILM COATED, EXTENDED RELEASE ORAL at 18:04

## 2018-09-14 RX ADMIN — CHLORPROMAZINE HYDROCHLORIDE 50 MG: 50 TABLET, SUGAR COATED ORAL at 20:54

## 2018-09-14 RX ADMIN — PANTOPRAZOLE SODIUM 40 MG: 40 TABLET, DELAYED RELEASE ORAL at 05:33

## 2018-09-14 RX ADMIN — BUPROPION HYDROCHLORIDE 100 MG: 100 TABLET, FILM COATED, EXTENDED RELEASE ORAL at 08:23

## 2018-09-14 RX ADMIN — POTASSIUM CHLORIDE 20 MEQ: 1500 TABLET, EXTENDED RELEASE ORAL at 08:23

## 2018-09-14 RX ADMIN — LORAZEPAM 2 MG: 1 TABLET ORAL at 18:28

## 2018-09-14 RX ADMIN — FLUCONAZOLE 150 MG: 150 TABLET ORAL at 10:15

## 2018-09-14 RX ADMIN — TOPIRAMATE 50 MG: 25 TABLET, FILM COATED ORAL at 18:04

## 2018-09-14 RX ADMIN — PILOCARPINE HYDROCHLORIDE 5 MG: 5 TABLET, FILM COATED ORAL at 17:00

## 2018-09-14 RX ADMIN — PILOCARPINE HYDROCHLORIDE 5 MG: 5 TABLET, FILM COATED ORAL at 08:26

## 2018-09-14 RX ADMIN — ESTRADIOL 1 MG: 1 TABLET ORAL at 08:25

## 2018-09-14 RX ADMIN — LURASIDONE HYDROCHLORIDE 120 MG: 80 TABLET, FILM COATED ORAL at 18:04

## 2018-09-14 RX ADMIN — LEVOTHYROXINE SODIUM 250 MCG: 125 TABLET ORAL at 16:27

## 2018-09-14 RX ADMIN — IBUPROFEN 600 MG: 600 TABLET, FILM COATED ORAL at 04:07

## 2018-09-14 RX ADMIN — TRAZODONE HYDROCHLORIDE 200 MG: 100 TABLET ORAL at 20:58

## 2018-09-14 RX ADMIN — MAGNESIUM HYDROXIDE 30 ML: 400 SUSPENSION ORAL at 04:24

## 2018-09-14 RX ADMIN — BUSPIRONE HYDROCHLORIDE 15 MG: 10 TABLET ORAL at 08:23

## 2018-09-14 NOTE — PROGRESS NOTES
Pt remains on constant observation  Pt's mood is labile  When pt doesn't get her own way or doesn't like the was something is, she gets upset and irritable  Pt was upset about her diet and stated she wasn't going to eat  Pt made aware that we would try to find out why her diet was changed  [place] resource person in and spoke with pt  Dicussed with pt about her getting a new ACT team  Pt upset and doesn't want to change  Pt asking for another "chance " Pt thinks that her getting a new therapist would be beneficial for her  Pt has intermittent SI with thoughts to "cut " No attempts made to harm self  Pt thinks that she may be able to let staff know when she has these thoughts

## 2018-09-14 NOTE — PROGRESS NOTES
Patient became increasingly angry, tapping her head off the wall repeatedly but lightly and she had herself in a corner of the room making it difficult to come close to her  When RN spoke to her she would not respond verbally and she continued to repeat this behavior  Offered but refused to accept po prn  This behavior continued with her completely  unwilling to move away from the wall and turning away from this RN who was trying to redirect the behavior and/or promote her ability to verbalize the problem  Security came to the floor and it was explained to patient that for her safety they were going to walk her towards her bed away from the wall  She was encouraged throughout the interaction to do this herself and it would be unnecessary for security to help her  She continues non verbal and angry and as soon as they moved towards her to remove her from the wall/corner and walk her towards her bed, she became combative  She was placed in 4 pt restraint at 1935

## 2018-09-14 NOTE — PLAN OF CARE
Problem: Ineffective Coping  Goal: Participates in unit activities  Interventions:  - Provide therapeutic environment   - Provide required programming   - Redirect inappropriate behaviors    Outcome: Progressing  Not comfortable with group setting   Provided with individualized work packet, and meeting with MARIE/L

## 2018-09-14 NOTE — PLAN OF CARE
Problem: Nutrition/Hydration-ADULT  Goal: Nutrient/Hydration intake appropriate for improving, restoring or maintaining nutritional needs  Monitor and assess patient's nutrition/hydration status for malnutrition (ex- brittle hair, bruises, dry skin, pale skin and conjunctiva, muscle wasting, smooth red tongue, and disorientation)  Collaborate with interdisciplinary team and initiate plan and interventions as ordered  Monitor patient's weight and dietary intake as ordered or per policy  Utilize nutrition screening tool and intervene per policy  Determine patient's food preferences and provide high-protein, high-caloric foods as appropriate       INTERVENTIONS:  - Monitor oral intake, urinary output, labs, and treatment plans  - Assess nutrition and hydration status and recommend course of action  - Evaluate amount of meals eaten  - Assist patient with eating if necessary   - Allow adequate time for meals  - Recommend/ encourage appropriate diets, oral nutritional supplements, and vitamin/mineral supplements  - Order, calculate, and assess calorie counts as needed  - Recommend, monitor, and adjust tube feedings and TPN/PPN based on assessed needs  - Assess need for intravenous fluids  - Provide specific nutrition/hydration education as appropriate  - Include patient/family/caregiver in decisions related to nutrition   Outcome: Progressing      Problem: SAFETY, RESTRAINT - VIOLENT/SELF-DESTRUCTIVE  Goal: Remains free of harm/injury from restraints (Restraint for Violent/Self-Destructive Behavior)  INTERVENTIONS:  - Instruct patient/family regarding restraint use   - Assess and monitor physiologic and psychological status   - Provide interventions and comfort measures to meet assessed patient needs   - Ensure continuous in person monitoring is provided   - Identify and implement measures to help patient regain control  - Assess readiness for release of restraint   Outcome: Progressing    Goal: Returns to optimal restraint-free functioning  INTERVENTIONS:  - Assess the patient's behavior and symptoms that indicate continued need for restraint  - Identify and implement measures to help patient regain control  - Assess readiness for release of restraint    Outcome: Progressing      Problem: Risk for Self Injury/Neglect  Goal: Treatment Goal: Remain safe during length of stay, learn and adopt new coping skills, and be free of self-injurious ideation, impulses and acts at the time of discharge  Outcome: Progressing    Goal: Verbalize thoughts and feelings  Interventions:  - Assess and re-assess patient's lethality and potential for self-injury  - Engage patient in 1:1 interactions, daily, for a minimum of 15 minutes  - Encourage patient to express feelings, fears, frustrations, hopes  - Establish rapport/trust with patient    Outcome: Progressing    Goal: Refrain from harming self  Interventions:  - Monitor patient closely, per order  - Develop a trusting relationship  - Supervise medication ingestion, monitor effects and side effects    Outcome: Progressing    Goal: Recognize maladaptive responses and adopt new coping mechanisms  Outcome: Progressing      Problem: Risk for Violence/Aggression Toward Others  Goal: Treatment Goal: Refrain from acts of violence/aggression during length of stay, and demonstrate improved impulse control at the time of discharge  Outcome: Progressing    Goal: Verbalize thoughts and feelings  Interventions:  - Assess and re-assess patient's level of risk, every waking shift  - Engage patient in 1:1 interactions, daily, for a minimum of 15 minutes   - Allow patient to express feelings and frustrations in a safe and non-threatening manner   - Establish rapport/trust with patient    Outcome: Progressing    Goal: Refrain from harming others  Outcome: Progressing    Goal: Refrain from destructive acts on the environment or property  Outcome: Progressing    Goal: Control angry outbursts  Interventions:  - Monitor patient closely, per order  - Ensure early verbal de-escalation  - Monitor prn medication needs  - Set reasonable/therapeutic limits, outline behavioral expectations, and consequences   - Provide a non-threatening milieu, utilizing the least restrictive interventions    Outcome: Progressing    Goal: Identify appropriate positive anger management techniques  Interventions:  - Offer anger management and coping skills groups   - Staff will provide positive feedback for appropriate anger control   Outcome: Progressing
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
## 2018-09-14 NOTE — PROGRESS NOTES
Neva Messina awoke and was complaining of stomach pain around 0424  She was given her PRN of Milk of Mag to help with her stomach pain  As of 0530 PRN appeared to be effective for patient is in her room and appears to be sleeping  Nursing will continue to monitor
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
## 2018-09-14 NOTE — PROGRESS NOTES
Progress Note - [address] 56 y o  female MRN: [id number]   Unit/Bed#: NW7 758-01 Encounter: [id number]    Behavior over the last 24 hours: limited improvement  Carlos Camarena  Was discussed in morning report case reviewed team as well as with her nursing care team and a review of the records was complete  A face-to-face discussion was completed for continuing care  Please reports continued passive suicidal ideation with a plan to cut herself, she is jarod for safety on the unit though she reports these thoughts as frequent  She denies homicidal ideation  She denies auditory and visual hallucinations  She continues to feel depressed at a 9/10 and anxious at a 9/10, 10 is the worst    Patient is tearful off and on throughout the interview reporting that she met with her act team today and they are so twitching her care services as they feel she needs a more intensive group  She continues to feel intermittent periods of anger and rage she feels these feelings are more intense in the morning though she feels medications are starting to help        Sleep: normal  Appetite: decreased  Medication side effects: No   ROS: reports diarrhea and headache, denies any shortness of breath, chest pain or abdominal pain    Mental Status Evaluation:    Appearance:  wearing hospital clothes   Behavior:  calm, limited eye contact   Speech:  normal volume, decreased rate   Mood:  depressed, anxious   Affect:  constricted   Thought Process:  circumstantial, concrete   Associations: concrete associations   Thought Content:  obsessive thoughts, negative thinking   Perceptual Disturbances: denies auditory or visual hallucinations when asked, does not appear responding to internal stimuli   Risk Potential: Suicidal ideation - Yes, with plan to cut self, contracts for safety on the unit, would talk to staff if not feeling safe on the unit  Homicidal ideation - None  Potential for aggression - Yes, due to agitation   Sensorium:  oriented to person, place and time/date   Memory:  recent memory intact, remote memory intact   Consciousness:  alert and awake   Attention: attention span and concentration appear shorter than expected for age   Insight:  impaired   Judgment: limited   Gait/Station: normal gait/station   Motor Activity: no abnormal movements     Vital signs in last 24 hours:    Temp:  [97 3 °F (36 3 °C)-98 °F (36 7 °C)] 97 3 °F (36 3 °C)  HR:  [62-66] 63  Resp:  [16] 16  BP: (130-136)/(62-83) 131/63    Laboratory results:    I have personally reviewed all pertinent laboratory/tests results  Most Recent Labs:   Lab Results   Component Value Date    WBC 8 83 09/13/2018    RBC 4 64 09/13/2018    HGB 13 2 09/13/2018    HCT 41 1 09/13/2018     09/13/2018    RDW 15 8 (H) 09/13/2018    NEUTROABS 5 31 09/13/2018     09/06/2018    CO2 31 09/06/2018    BUN 7 09/06/2018    CREATININE 0 55 09/06/2018    GLUC 80 09/06/2018    CALCIUM 9 5 09/06/2018    [id number] 1 370 09/13/2018       Progress Toward Goals: limited improvement    Assessment/Plan   Principal Problem:    Schizoaffective disorder, bipolar type ([facility] [address] )    Recommended Treatment:     Planned medication and treatment changes:    -All current active medications have been reviewed  -Encourage group therapy, milieu therapy and occupational therapy  -Behavioral Health checks every 5 minutes  -Continue close observation for safety  -Increase Thorazine from 25 mg p o  T i d  To Thorazine 25 mg p o  Q a m , 50 mg p o  Q afternoon and 50 mg p o  Q evening for continued treatment of mood disorder  -Increase  BuSpar from 15 mg p o  T i d   To BuSpar 30 mg p o  B i d  For anxiety and self-harm  -Continue Wellbutrin 100 mg po BID  -Continue Latuda 120 mg PO daily with dinner  -Continue trazodone 200 mg PO Q HS  -Continue Topamax 50 mg po BID   -Change diet from non Ulcerogenic Diet to regular diet as patient is acting out related to diet and is not following this type of diet at home, spoke with Internal Medicine regarding this change  They support maintaining the non Ulcerogenic Diet omid understand that if this will continue to impact behavioral outbursts and she is not following at home may need to make a change to regular  Patient agrees that if she starts to have any stomach issues after change to the regular diet she will be changed back to the non Ulcerogenic Diet   -Continue all other medications as ordered          Current Facility-Administered Medications:  acetaminophen 650 mg Oral Q6H PRN Holland Myers MD   aluminum-magnesium hydroxide-simethicone 30 mL Oral Q4H PRN Holland Myers MD   amLODIPine 10 mg Oral HS Samantha Marti MD   benztropine 1 mg Intramuscular Q1H PRN Demetrice Dueñas MD   benztropine 1 mg Oral Q1H PRN Demetrice Dueñas MD   buPROPion 100 mg Oral BID Samantha Marti MD   busPIRone 30 mg Oral BID JHONATAN Flores   [START ON 9/15/2018] chlorproMAZINE 25 mg Oral Daily JHONATAN Flores   chlorproMAZINE 50 mg Oral BID JHONATAN Flores   estradiol 1 mg Oral Daily Samantha Marti MD   furosemide 20 mg Oral Daily Samantha Marti MD   haloperidol lactate 5 mg Intramuscular Q4H PRN Holland Myers MD   hydrOXYzine HCL 50 mg Oral Q6H PRN Samantha Marti MD   ibuprofen 600 mg Oral Q6H PRN Samantha Marti MD   ibuprofen 600 mg Oral Q8H PRN Samanthajob Marti MD   levothyroxine 250 mcg Oral Before Rosary Boast, MD   lidocaine 1 patch Transdermal Daily Samantha Marti MD   LORazepam 2 mg Intramuscular Q4H PRN Demetrice Dueñas MD   LORazepam 2 mg Oral Q6H PRN Samantha Marti MD   losartan 50 mg Oral Daily Samantha Marti MD   lurasidone 120 mg Oral Daily With Rosary Boast, MD   magnesium hydroxide 30 mL Oral Daily PRN Demetrice Dueñas MD   metoprolol tartrate 50 mg Oral Q12H [address] Samantha Marti MD   OLANZapine 5 mg Intramuscular Q3H PRN Demetrice Dueñas MD   pantoprazole 40 mg Oral Early Morning Nevelyn Holstein, MD   pilocarpine 5 mg Oral TID Tara Vanegas MD   potassium chloride 20 mEq Oral Daily Tara Vanegas MD   risperiDONE 1 mg Oral Q3H PRN Jos Eden MD   tiZANidine 4 mg Oral TID PRN Tara Vanegas MD   topiramate 50 mg Oral After Bobbye Dose, MD   topiramate 50 mg Oral Daily Tara Vanegas MD   traZODone 200 mg Oral HS Tara Vanegas MD   traZODone 50 mg Oral HS PRN Jos Eden MD       Risks / Benefits of Treatment:    Risks, benefits, and possible side effects of medications explained to patient and patient verbalizes understanding and agreement for treatment  Risks of medications in pregnancy explained if female patient  Patient verbalizes understanding and agrees to notify her doctor if she becomes pregnant  Counseling / Coordination of Care:    Patient's progress discussed with staff in treatment team meeting  Medications, treatment progress and treatment plan reviewed with patient  Supportive counseling provided to the patient

## 2018-09-15 PROCEDURE — 99232 SBSQ HOSP IP/OBS MODERATE 35: CPT | Performed by: PSYCHIATRY & NEUROLOGY

## 2018-09-15 RX ORDER — LOPERAMIDE HYDROCHLORIDE 2 MG/1
2 CAPSULE ORAL EVERY 2 HOUR PRN
Status: DISCONTINUED | OUTPATIENT
Start: 2018-09-15 | End: 2018-09-19 | Stop reason: HOSPADM

## 2018-09-15 RX ADMIN — BUSPIRONE HYDROCHLORIDE 30 MG: 10 TABLET ORAL at 17:20

## 2018-09-15 RX ADMIN — CHLORPROMAZINE HYDROCHLORIDE 50 MG: 50 TABLET, SUGAR COATED ORAL at 16:21

## 2018-09-15 RX ADMIN — PILOCARPINE HYDROCHLORIDE 5 MG: 5 TABLET, FILM COATED ORAL at 16:15

## 2018-09-15 RX ADMIN — BUPROPION HYDROCHLORIDE 100 MG: 100 TABLET, FILM COATED, EXTENDED RELEASE ORAL at 08:18

## 2018-09-15 RX ADMIN — LEVOTHYROXINE SODIUM 250 MCG: 125 TABLET ORAL at 16:19

## 2018-09-15 RX ADMIN — BUPROPION HYDROCHLORIDE 100 MG: 100 TABLET, FILM COATED, EXTENDED RELEASE ORAL at 17:20

## 2018-09-15 RX ADMIN — AMLODIPINE BESYLATE 10 MG: 10 TABLET ORAL at 21:08

## 2018-09-15 RX ADMIN — ESTRADIOL 1 MG: 1 TABLET ORAL at 08:53

## 2018-09-15 RX ADMIN — LORAZEPAM 2 MG: 1 TABLET ORAL at 08:18

## 2018-09-15 RX ADMIN — IBUPROFEN 600 MG: 600 TABLET, FILM COATED ORAL at 03:38

## 2018-09-15 RX ADMIN — BUSPIRONE HYDROCHLORIDE 30 MG: 10 TABLET ORAL at 08:18

## 2018-09-15 RX ADMIN — LURASIDONE HYDROCHLORIDE 120 MG: 80 TABLET, FILM COATED ORAL at 16:18

## 2018-09-15 RX ADMIN — PILOCARPINE HYDROCHLORIDE 5 MG: 5 TABLET, FILM COATED ORAL at 08:53

## 2018-09-15 RX ADMIN — LOPERAMIDE HYDROCHLORIDE 2 MG: 2 CAPSULE ORAL at 19:05

## 2018-09-15 RX ADMIN — PILOCARPINE HYDROCHLORIDE 5 MG: 5 TABLET, FILM COATED ORAL at 21:08

## 2018-09-15 RX ADMIN — CHLORPROMAZINE HYDROCHLORIDE 50 MG: 50 TABLET, SUGAR COATED ORAL at 21:07

## 2018-09-15 RX ADMIN — TIZANIDINE 4 MG: 4 TABLET ORAL at 12:04

## 2018-09-15 RX ADMIN — PANTOPRAZOLE SODIUM 40 MG: 40 TABLET, DELAYED RELEASE ORAL at 06:28

## 2018-09-15 RX ADMIN — LOSARTAN POTASSIUM 50 MG: 50 TABLET ORAL at 08:53

## 2018-09-15 RX ADMIN — POTASSIUM CHLORIDE 20 MEQ: 1500 TABLET, EXTENDED RELEASE ORAL at 08:19

## 2018-09-15 RX ADMIN — TRAZODONE HYDROCHLORIDE 200 MG: 100 TABLET ORAL at 21:08

## 2018-09-15 RX ADMIN — LIDOCAINE 1 PATCH: 50 PATCH CUTANEOUS at 08:19

## 2018-09-15 RX ADMIN — METOPROLOL TARTRATE 50 MG: 50 TABLET, FILM COATED ORAL at 08:19

## 2018-09-15 RX ADMIN — CHLORPROMAZINE HYDROCHLORIDE 25 MG: 25 TABLET, SUGAR COATED ORAL at 08:19

## 2018-09-15 RX ADMIN — BACITRACIN ZINC 1 SMALL APPLICATION: 500 OINTMENT TOPICAL at 08:19

## 2018-09-15 RX ADMIN — TOPIRAMATE 50 MG: 25 TABLET, FILM COATED ORAL at 08:19

## 2018-09-15 RX ADMIN — FUROSEMIDE 20 MG: 20 TABLET ORAL at 08:53

## 2018-09-15 RX ADMIN — METOPROLOL TARTRATE 50 MG: 50 TABLET, FILM COATED ORAL at 21:08

## 2018-09-15 RX ADMIN — TOPIRAMATE 50 MG: 25 TABLET, FILM COATED ORAL at 17:21

## 2018-09-15 RX ADMIN — IBUPROFEN 600 MG: 600 TABLET, FILM COATED ORAL at 12:04

## 2018-09-15 NOTE — PROGRESS NOTES
Amlodipine and Metoprolol unable to be given at HS as vital sign parameters not met  Ativan 2mgs po prn given mid evening at patient request for "anxiety" - she did get some benefit following the Ativan with reduction of anxiety but she was mulling over "how much my family hate me " She was able to be redirected away from that thinking
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
## 2018-09-15 NOTE — PROGRESS NOTES
Patient has made no attempts to hurt herself  Patient mood is labile and becomes upset easily if she isnt getting her way

## 2018-09-15 NOTE — PROGRESS NOTES
Patient became upset this morning over her breakfast   Her diet had been switched back to regular but the kitchen still had her as non ulcer diet and sent up the wrong tray  Patient went to her room hit the side table and was pouting  Staff was able to correct the issue  Patient was educated on coping, changes, flexibility, and actions disproportionate to the circumstance  Patient was able to accept the reasoning

## 2018-09-15 NOTE — PLAN OF CARE
Risk for Self Injury/Neglect     Treatment Goal: Remain safe during length of stay, learn and adopt new coping skills, and be free of self-injurious ideation, impulses and acts at the time of discharge Not Progressing     Verbalize thoughts and feelings Not Progressing     Recognize maladaptive responses and adopt new coping mechanisms Not Progressing        Risk for Violence/Aggression Toward Others     Treatment Goal: Refrain from acts of violence/aggression during length of stay, and demonstrate improved impulse control at the time of discharge Not Progressing     Verbalize thoughts and feelings Not Progressing     Control angry outbursts Not Progressing     Identify appropriate positive anger management techniques Not Progressing

## 2018-09-15 NOTE — PROGRESS NOTES
Progress Note - [address] [place] 56 y o  female MRN: [id number]  Unit/Bed#: PD3 758-01 Encounter: [id number]     According to records admitted on a 201 due to depression, Bipolar disorder and inability to care for herself  PT stated she should be home dealing with her cat, but agrees she was feeling " pretty bad" prior to admission  Talked about Conflict with a friend as well as problems  in her apartment and having to move out  Contracts for safety       Behavior over the last 24 hours:   Slowly improving  Sleep:  improved  Appetite: weight gain  Medication side effects: Yes,  Some sedation   ROS: diarrhea and painful walking and tired    Medications:   Current Facility-Administered Medications   Medication Dose Route Frequency Provider Last Rate Last Dose    acetaminophen (TYLENOL) tablet 650 mg  650 mg Oral Q6H PRN Holland Myers MD   650 mg at 09/12/18 1638    aluminum-magnesium hydroxide-simethicone (MYLANTA) 200-200-20 mg/5 mL oral suspension 30 mL  30 mL Oral Q4H PRN Holland Myers MD        amLODIPine (NORVASC) tablet 10 mg  10 mg Oral  Juan A Craig MD   10 mg at 09/13/18 2135    bacitracin topical ointment 1 small application  1 small application Topical BID Leah Powers PA-C   1 small application at 24/38/60 2038    benztropine (COGENTIN) injection 1 mg  1 mg Intramuscular Q1H PRN Holland Myers MD   1 mg at 09/13/18 1933    benztropine (COGENTIN) tablet 1 mg  1 mg Oral Q1H PRN Sherren Moose, MD        buPROPion (WELLBUTRIN SR) 12 hr tablet 100 mg  100 mg Oral BID Juan A Craig MD   100 mg at 09/14/18 1804    busPIRone (BUSPAR) tablet 30 mg  30 mg Oral BID JHONATAN Jones   30 mg at 09/14/18 1807    [START ON 9/15/2018] chlorproMAZINE (THORAZINE) tablet 25 mg  25 mg Oral Daily JHONATAN Jones        chlorproMAZINE (THORAZINE) tablet 50 mg  50 mg Oral BID JHONATAN Jones   50 mg at 09/14/18 2054    estradiol (ESTRACE) tablet 1 mg 1 mg Oral Daily Tara Vanegas MD   1 mg at 09/14/18 0825    furosemide (LASIX) tablet 20 mg  20 mg Oral Daily Tara Vanegas MD   20 mg at 09/14/18 0826    haloperidol lactate (HALDOL) injection 5 mg  5 mg Intramuscular Q4H PRN Jos Eden MD   5 mg at 09/13/18 1933    hydrOXYzine HCL (ATARAX) tablet 50 mg  50 mg Oral Q6H PRN Tara Vanegas MD   50 mg at 09/14/18 2029    ibuprofen (MOTRIN) tablet 600 mg  600 mg Oral Q6H PRN Tara Vanegas MD   600 mg at 09/14/18 0407    ibuprofen (MOTRIN) tablet 600 mg  600 mg Oral Q8H PRN Tara Vanegas MD   600 mg at 09/13/18 0559    levothyroxine tablet 250 mcg  250 mcg Oral Before Arian Maradiaga MD   250 mcg at 09/14/18 1627    lidocaine (LIDODERM) 5 % patch 1 patch  1 patch Transdermal Daily Tara Vanegas MD   1 patch at 09/14/18 0823    LORazepam (ATIVAN) 2 mg/mL injection 2 mg  2 mg Intramuscular Q4H PRN Jos Eden MD   2 mg at 09/13/18 1933    LORazepam (ATIVAN) tablet 2 mg  2 mg Oral Q6H PRN Tara Vanegas MD   2 mg at 09/14/18 1828    losartan (COZAAR) tablet 50 mg  50 mg Oral Daily Tara Vanegas MD   50 mg at 09/14/18 0125    lurasidone (LATUDA) tablet 120 mg  120 mg Oral Daily With Arian Maradiaga MD   120 mg at 09/14/18 1804    magnesium hydroxide (MILK OF MAGNESIA) 400 mg/5 mL oral suspension 30 mL  30 mL Oral Daily PRN Jos Eden MD   30 mL at 09/14/18 0424    metoprolol tartrate (LOPRESSOR) tablet 50 mg  50 mg Oral Q12H [facility] Tara Vanegas MD   50 mg at 09/14/18 0823    OLANZapine (ZyPREXA) IM injection 5 mg  5 mg Intramuscular Q3H PRN Jos Eden MD        pantoprazole (PROTONIX) EC tablet 40 mg  40 mg Oral Early Morning Nevelyn Holstein, MD   40 mg at 09/14/18 0533    pilocarpine (SALAGEN) tablet 5 mg  5 mg Oral TID Tara Vanegas MD   5 mg at 09/14/18 2055    potassium chloride (K-DUR,KLOR-CON) CR tablet 20 mEq  20 mEq Oral Daily Tara Vanegas MD   20 mEq at 09/14/18 0823    risperiDONE (RisperDAL M-TABS) dispersible tablet 1 mg  1 mg Oral Q3H PRN Em Schultz MD   1 mg at 09/12/18 1251    tiZANidine (ZANAFLEX) tablet 4 mg  4 mg Oral TID PRN Goyo Gee MD        topiramate (TOPAMAX) tablet 50 mg  50 mg Oral After Beth Platt MD   50 mg at 09/14/18 1804    topiramate (TOPAMAX) tablet 50 mg  50 mg Oral Daily Goyo Gee MD   50 mg at 09/14/18 0824    traZODone (DESYREL) tablet 200 mg  200 mg Oral HS Goyo Gee MD   200 mg at 09/14/18 2058    traZODone (DESYREL) tablet 50 mg  50 mg Oral HS PRN Em Schultz MD   50 mg at 09/12/18 0019     Prescriptions Prior to Admission   Medication    amLODIPine (NORVASC) 10 mg tablet    buPROPion (WELLBUTRIN SR) 200 MG 12 hr tablet    estradiol (ESTRACE) 1 mg tablet    furosemide (LASIX) 20 mg tablet    levothyroxine 50 mcg tablet    losartan (COZAAR) 50 mg tablet    lurasidone (LATUDA) 80 mg tablet    metoprolol tartrate (LOPRESSOR) 50 mg tablet    omeprazole (PriLOSEC) 40 MG capsule    pilocarpine (SALAGEN) 5 mg tablet    potassium chloride (K-DUR,KLOR-CON) 20 mEq tablet    tiZANidine (ZANAFLEX) 4 mg tablet    topiramate (TOPAMAX) 25 mg tablet    traZODone (DESYREL) 100 mg tablet    busPIRone (BUSPAR) 30 MG tablet       Labs:   Admission on 09/11/2018   Component Date Value    EXTBreath Alcohol 09/11/2018 0 000     Amph/Meth UR 09/11/2018 Negative     Barbiturate Ur 09/11/2018 Negative     Benzodiazepine Urine 09/11/2018 Negative     Cocaine Urine 09/11/2018 Negative     Methadone Urine 09/11/2018 Negative     Opiate Urine 09/11/2018 Positive*    PCP Ur 09/11/2018 Negative     THC Urine 09/11/2018 Positive*    EXT PREG TEST UR (Ref: N* 09/11/2018 negative     Color, UA 09/13/2018 Dk Yellow     Clarity, UA 09/13/2018 Cloudy     Specific Gravity, UA 09/13/2018 1 022     pH, UA 09/13/2018 6 5     Leukocytes, UA 09/13/2018 Negative     Nitrite, UA 09/13/2018 Negative     Protein, UA 09/13/2018 Negative     Glucose, UA 09/13/2018 Negative     Ketones, UA 09/13/2018 Negative     Urobilinogen, UA 09/13/2018 0 2     Bilirubin, UA 09/13/2018 Negative     Blood, UA 09/13/2018 Negative     WBC 09/13/2018 8 83     RBC 09/13/2018 4 64     Hemoglobin 09/13/2018 13 2     Hematocrit 09/13/2018 41 1     MCV 09/13/2018 89     MCH 09/13/2018 28 4     MCHC 09/13/2018 32 1     RDW 09/13/2018 15 8*    MPV 09/13/2018 11 0     Platelets 48/79/6220 211     nRBC 09/13/2018 0     Neutrophils Relative 09/13/2018 59     Immat GRANS % 09/13/2018 1     Lymphocytes Relative 09/13/2018 28     Monocytes Relative 09/13/2018 10     Eosinophils Relative 09/13/2018 1     Basophils Relative 09/13/2018 1     Neutrophils Absolute 09/13/2018 5 31     Immature Grans Absolute 09/13/2018 0 05     Lymphocytes Absolute 09/13/2018 2 50     Monocytes Absolute 09/13/2018 0 85     Eosinophils Absolute 09/13/2018 0 08     Basophils Absolute 09/13/2018 0 04     Hemoglobin A1C 09/13/2018 6 0     EAG 09/13/2018 126     TSH 3RD GENERATON 09/13/2018 1 370     Lipase 09/13/2018 71*       Mental Status Evaluation:  Appearance:  disheveled, older than stated age and overweight   Behavior:  Cooperative   Speech:  normal pitch and normal volume   Mood:  depressed, irritable and labile   Affect:  mood-congruent   Thought Process:  coherent, goal directed   Thought Content:  No overt delusions   Perceptual Disturbances: Denied auditory and visual hallucinations   Risk Potential: Denies suicidal thoughts and plans and has not engaged in self-injurious behaviors   Sensorium:  person, place and time/date   Memory recent and remote memory grossly intact   Consciousness:  alert    Attention: attention span appeared shorter than expected for age   Insight:  limited   Judgment: impaired due to Mental illness   Gait/Station: Slow due to knee problems   Motor Activity: no abnormal movements Association                   Intact   Progress Toward Goals:   Has been compliant with medications and has started to attend therapeutic groups    Assessment/Plan   Principal Problem:    Schizoaffective disorder, bipolar type (HCC)  Medications  Latuda 120 mg daily  Topamax 50 mg bid  Trazodone 200 mg at bedtime  Thorazine 50 mg bid   BuSpar 30 mg b i d  Recommended Treatment: Continue with group therapy, milieu therapy and occupational therapy  Risks, benefits and possible side effects of Medications:   Risks, benefits, and possible side effects of medications explained to patient and patient verbalizes understanding  Counseling / Coordination of Care  Total floor / unit time spent today 20 minutes  Greater than 50% of total time was spent with the patient and / or family counseling and / or coordination of care

## 2018-09-16 PROCEDURE — 99232 SBSQ HOSP IP/OBS MODERATE 35: CPT | Performed by: PSYCHIATRY & NEUROLOGY

## 2018-09-16 RX ADMIN — FUROSEMIDE 20 MG: 20 TABLET ORAL at 08:57

## 2018-09-16 RX ADMIN — IBUPROFEN 600 MG: 600 TABLET, FILM COATED ORAL at 02:18

## 2018-09-16 RX ADMIN — TIZANIDINE 4 MG: 4 TABLET ORAL at 02:21

## 2018-09-16 RX ADMIN — BUPROPION HYDROCHLORIDE 100 MG: 100 TABLET, FILM COATED, EXTENDED RELEASE ORAL at 08:50

## 2018-09-16 RX ADMIN — BUSPIRONE HYDROCHLORIDE 30 MG: 10 TABLET ORAL at 17:23

## 2018-09-16 RX ADMIN — LOPERAMIDE HYDROCHLORIDE 2 MG: 2 CAPSULE ORAL at 13:16

## 2018-09-16 RX ADMIN — POTASSIUM CHLORIDE 20 MEQ: 1500 TABLET, EXTENDED RELEASE ORAL at 08:50

## 2018-09-16 RX ADMIN — LEVOTHYROXINE SODIUM 250 MCG: 125 TABLET ORAL at 17:23

## 2018-09-16 RX ADMIN — CHLORPROMAZINE HYDROCHLORIDE 50 MG: 50 TABLET, SUGAR COATED ORAL at 17:23

## 2018-09-16 RX ADMIN — TRAZODONE HYDROCHLORIDE 200 MG: 100 TABLET ORAL at 21:43

## 2018-09-16 RX ADMIN — BACITRACIN ZINC 1 SMALL APPLICATION: 500 OINTMENT TOPICAL at 17:29

## 2018-09-16 RX ADMIN — CHLORPROMAZINE HYDROCHLORIDE 50 MG: 50 TABLET, SUGAR COATED ORAL at 21:43

## 2018-09-16 RX ADMIN — METOPROLOL TARTRATE 50 MG: 50 TABLET, FILM COATED ORAL at 21:44

## 2018-09-16 RX ADMIN — PILOCARPINE HYDROCHLORIDE 5 MG: 5 TABLET, FILM COATED ORAL at 08:58

## 2018-09-16 RX ADMIN — PILOCARPINE HYDROCHLORIDE 5 MG: 5 TABLET, FILM COATED ORAL at 17:24

## 2018-09-16 RX ADMIN — IBUPROFEN 600 MG: 600 TABLET, FILM COATED ORAL at 20:32

## 2018-09-16 RX ADMIN — BACITRACIN ZINC 1 SMALL APPLICATION: 500 OINTMENT TOPICAL at 08:50

## 2018-09-16 RX ADMIN — TOPIRAMATE 50 MG: 25 TABLET, FILM COATED ORAL at 08:56

## 2018-09-16 RX ADMIN — CHLORPROMAZINE HYDROCHLORIDE 25 MG: 25 TABLET, SUGAR COATED ORAL at 08:56

## 2018-09-16 RX ADMIN — BUSPIRONE HYDROCHLORIDE 30 MG: 10 TABLET ORAL at 08:50

## 2018-09-16 RX ADMIN — LORAZEPAM 2 MG: 1 TABLET ORAL at 12:12

## 2018-09-16 RX ADMIN — LURASIDONE HYDROCHLORIDE 120 MG: 80 TABLET, FILM COATED ORAL at 17:22

## 2018-09-16 RX ADMIN — LIDOCAINE 1 PATCH: 50 PATCH CUTANEOUS at 08:49

## 2018-09-16 RX ADMIN — LOSARTAN POTASSIUM 50 MG: 50 TABLET ORAL at 08:57

## 2018-09-16 RX ADMIN — BUPROPION HYDROCHLORIDE 100 MG: 100 TABLET, FILM COATED, EXTENDED RELEASE ORAL at 17:23

## 2018-09-16 RX ADMIN — LOPERAMIDE HYDROCHLORIDE 2 MG: 2 CAPSULE ORAL at 20:28

## 2018-09-16 RX ADMIN — TOPIRAMATE 50 MG: 25 TABLET, FILM COATED ORAL at 17:24

## 2018-09-16 RX ADMIN — PANTOPRAZOLE SODIUM 40 MG: 40 TABLET, DELAYED RELEASE ORAL at 06:46

## 2018-09-16 RX ADMIN — LOPERAMIDE HYDROCHLORIDE 2 MG: 2 CAPSULE ORAL at 17:25

## 2018-09-16 RX ADMIN — AMLODIPINE BESYLATE 10 MG: 10 TABLET ORAL at 21:44

## 2018-09-16 RX ADMIN — ESTRADIOL 1 MG: 1 TABLET ORAL at 08:57

## 2018-09-16 RX ADMIN — PILOCARPINE HYDROCHLORIDE 5 MG: 5 TABLET, FILM COATED ORAL at 21:43

## 2018-09-16 RX ADMIN — METOPROLOL TARTRATE 50 MG: 50 TABLET, FILM COATED ORAL at 08:50

## 2018-09-16 NOTE — PROGRESS NOTES
Progress Note - [address] [place] 56 y o  female MRN: [id number]  Unit/Bed#: QT5 758-01 Encounter: [id number]  I spoke with staff about patient and also met with her one- on one  Staff reported she has been less irritable but her mood does fluctuate  She still remains on one-to-one  When I met with Arianna Oliva she was  Was upset that she had made a comment that she had said kittindly  and was afraid  It would affect her discharged  We discussed, more important than what she says is what she does, and to continue   To be free of self injurious behaviors  Overall she stated is having a better day    Will continue on 1:1    Behavior over the last 24 hours:   No self-injurious behaviors  Sleep: normal  Appetite: normal  Medication side effects: No  ROS: no complaints    Medications:   Current Facility-Administered Medications   Medication Dose Route Frequency Provider Last Rate Last Dose    acetaminophen (TYLENOL) tablet 650 mg  650 mg Oral Q6H PRN Holland Myers MD   650 mg at 09/12/18 1638    aluminum-magnesium hydroxide-simethicone (MYLANTA) 200-200-20 mg/5 mL oral suspension 30 mL  30 mL Oral Q4H PRN Holland Myers MD        amLODIPine (NORVASC) tablet 10 mg  10 mg Oral HS Tapan Tyson MD   10 mg at 09/15/18 2108    bacitracin topical ointment 1 small application  1 small application Topical BID Lesli Berumen PA-C   1 small application at 39/66/80 0850    benztropine (COGENTIN) injection 1 mg  1 mg Intramuscular Q1H PRN Holland Myers MD   1 mg at 09/13/18 1933    benztropine (COGENTIN) tablet 1 mg  1 mg Oral Q1H PRN Ebony Opitz, MD        buPROPion (WELLBUTRIN SR) 12 hr tablet 100 mg  100 mg Oral BID Tapan Tyson MD   100 mg at 09/16/18 0850    busPIRone (BUSPAR) tablet 30 mg  30 mg Oral BID JHONATAN Machado   30 mg at 09/16/18 0850    chlorproMAZINE (THORAZINE) tablet 25 mg  25 mg Oral Daily JHONATAN Machado   25 mg at 09/16/18 0856    chlorproMAZINE (THORAZINE) tablet 50 mg  50 mg Oral BID JHONATAN Aviles   50 mg at 09/15/18 2107    estradiol (ESTRACE) tablet 1 mg  1 mg Oral Daily Gilberto Martinez MD   1 mg at 09/16/18 0857    furosemide (LASIX) tablet 20 mg  20 mg Oral Daily Gilberto Martinez MD   20 mg at 09/16/18 0857    haloperidol lactate (HALDOL) injection 5 mg  5 mg Intramuscular Q4H PRN Nat Slater MD   5 mg at 09/13/18 1933    hydrOXYzine HCL (ATARAX) tablet 50 mg  50 mg Oral Q6H PRN Gilberto Martinez MD   50 mg at 09/14/18 2029    ibuprofen (MOTRIN) tablet 600 mg  600 mg Oral Q6H PRN Gilberto Martinez MD   600 mg at 09/16/18 0218    ibuprofen (MOTRIN) tablet 600 mg  600 mg Oral Q8H PRN Gilberto Martinez MD   600 mg at 09/15/18 1204    levothyroxine tablet 250 mcg  250 mcg Oral Before Linh Sheriff MD   250 mcg at 09/15/18 1619    lidocaine (LIDODERM) 5 % patch 1 patch  1 patch Transdermal Daily Gilberto Martinez MD   1 patch at 09/16/18 0849    loperamide (IMODIUM) capsule 2 mg  2 mg Oral Q2H PRN Addi Jung MD   2 mg at 09/16/18 1316    LORazepam (ATIVAN) 2 mg/mL injection 2 mg  2 mg Intramuscular Q4H PRN Nat Slater MD   2 mg at 09/13/18 1933    LORazepam (ATIVAN) tablet 2 mg  2 mg Oral Q6H PRN Gilberto Martinez MD   2 mg at 09/16/18 1212    losartan (COZAAR) tablet 50 mg  50 mg Oral Daily Gilberto Martinez MD   50 mg at 09/16/18 0857    lurasidone (LATUDA) tablet 120 mg  120 mg Oral Daily With Linh Sheriff MD   120 mg at 09/15/18 1618    magnesium hydroxide (MILK OF MAGNESIA) 400 mg/5 mL oral suspension 30 mL  30 mL Oral Daily PRN Nta Slater MD   30 mL at 09/14/18 0424    metoprolol tartrate (LOPRESSOR) tablet 50 mg  50 mg Oral Q12H [facility] Gilbreto Martinez MD   50 mg at 09/16/18 0850    OLANZapine (ZyPREXA) IM injection 5 mg  5 mg Intramuscular Q3H PRN Nat Slater MD        pantoprazole (PROTONIX) EC tablet 40 mg  40 mg Oral Early Morning Zandra Kapoor MD Maritza   40 mg at 09/16/18 0646    pilocarpine (SALAGEN) tablet 5 mg  5 mg Oral TID Joseph Gagnon MD   5 mg at 09/16/18 0858    potassium chloride (K-DUR,KLOR-CON) CR tablet 20 mEq  20 mEq Oral Daily Joseph Gagnon MD   20 mEq at 09/16/18 0850    risperiDONE (RisperDAL M-TABS) dispersible tablet 1 mg  1 mg Oral Q3H PRN Brant Mitchell MD   1 mg at 09/12/18 1251    tiZANidine (ZANAFLEX) tablet 4 mg  4 mg Oral TID PRN Joseph Gagnon MD   4 mg at 09/16/18 0221    topiramate (TOPAMAX) tablet 50 mg  50 mg Oral After Samara Monk MD   50 mg at 09/15/18 1721    topiramate (TOPAMAX) tablet 50 mg  50 mg Oral Daily Joseph Gagnon MD   50 mg at 09/16/18 0856    traZODone (DESYREL) tablet 200 mg  200 mg Oral HS Joseph Gagnon MD   200 mg at 09/15/18 2108    traZODone (DESYREL) tablet 50 mg  50 mg Oral HS PRN Brant Mitchell MD   50 mg at 09/12/18 0019     Prescriptions Prior to Admission   Medication    amLODIPine (NORVASC) 10 mg tablet    buPROPion (WELLBUTRIN SR) 200 MG 12 hr tablet    estradiol (ESTRACE) 1 mg tablet    furosemide (LASIX) 20 mg tablet    levothyroxine 50 mcg tablet    losartan (COZAAR) 50 mg tablet    lurasidone (LATUDA) 80 mg tablet    metoprolol tartrate (LOPRESSOR) 50 mg tablet    omeprazole (PriLOSEC) 40 MG capsule    pilocarpine (SALAGEN) 5 mg tablet    potassium chloride (K-DUR,KLOR-CON) 20 mEq tablet    tiZANidine (ZANAFLEX) 4 mg tablet    topiramate (TOPAMAX) 25 mg tablet    traZODone (DESYREL) 100 mg tablet    busPIRone (BUSPAR) 30 MG tablet       Labs:   Admission on 09/11/2018   Component Date Value    EXTBreath Alcohol 09/11/2018 0 000     Amph/Meth UR 09/11/2018 Negative     Barbiturate Ur 09/11/2018 Negative     Benzodiazepine Urine 09/11/2018 Negative     Cocaine Urine 09/11/2018 Negative     Methadone Urine 09/11/2018 Negative     Opiate Urine 09/11/2018 Positive*    PCP Ur 09/11/2018 Negative     THC Urine 09/11/2018 Positive*    EXT PREG TEST UR (Ref: N* 09/11/2018 negative     Color, UA 09/13/2018 Dk Yellow     Clarity, UA 09/13/2018 Cloudy     Specific Gravity, UA 09/13/2018 1 022     pH, UA 09/13/2018 6 5     Leukocytes, UA 09/13/2018 Negative     Nitrite, UA 09/13/2018 Negative     Protein, UA 09/13/2018 Negative     Glucose, UA 09/13/2018 Negative     Ketones, UA 09/13/2018 Negative     Urobilinogen, UA 09/13/2018 0 2     Bilirubin, UA 09/13/2018 Negative     Blood, UA 09/13/2018 Negative     WBC 09/13/2018 8 83     RBC 09/13/2018 4 64     Hemoglobin 09/13/2018 13 2     Hematocrit 09/13/2018 41 1     MCV 09/13/2018 89     MCH 09/13/2018 28 4     MCHC 09/13/2018 32 1     RDW 09/13/2018 15 8*    MPV 09/13/2018 11 0     Platelets 84/03/5016 211     nRBC 09/13/2018 0     Neutrophils Relative 09/13/2018 59     Immat GRANS % 09/13/2018 1     Lymphocytes Relative 09/13/2018 28     Monocytes Relative 09/13/2018 10     Eosinophils Relative 09/13/2018 1     Basophils Relative 09/13/2018 1     Neutrophils Absolute 09/13/2018 5 31     Immature Grans Absolute 09/13/2018 0 05     Lymphocytes Absolute 09/13/2018 2 50     Monocytes Absolute 09/13/2018 0 85     Eosinophils Absolute 09/13/2018 0 08     Basophils Absolute 09/13/2018 0 04     Hemoglobin A1C 09/13/2018 6 0     EAG 09/13/2018 126     TSH 3RD GENERATON 09/13/2018 1 370     Lipase 09/13/2018 71*       Mental Status Evaluation:  Appearance:  older than stated age and overweight   Behavior:  More cooperative   Speech:  normal pitch and normal volume   Mood:  anxious and Less labile   Affect:  normal and Brighter   Thought Process:  More goal-directed   Thought Content:  No overt delusions   Perceptual Disturbances: None   Risk Potential: Denies suicidal thoughts and plans and has not engaged in self-injurious behaviors in the past 48 hr   Sensorium:  person, place and time/date   Memory recent and remote memory grossly intact   Consciousness: alert    Attention: attention span appeared shorter than expected for age   Insight:  Improving   Judgment: Improving   Gait/Station: slow   Motor Activity: no abnormal movements   Association                    Intact    Progress Toward Goals:  Has been taking her medications and more cooperative with one on one  Overall brighter affect    Assessment/Plan   Principal Problem:    Schizoaffective disorder, bipolar type ([facility] [address] )      Recommended Treatment: Continue with group therapy, milieu therapy and occupational therapy  medication management and nursing monitoring  Risks, benefits and possible side effects of Medications:   Risks, benefits, and possible side effects of medications explained to patient and patient verbalizes understanding  Counseling / Coordination of Care  Total floor / unit time spent today 20 minutes  Greater than 50% of total time was spent with the patient and / or family counseling and / or coordination of care

## 2018-09-16 NOTE — PROGRESS NOTES
Patient given PO ativan at approx  12:30 pm   Ativan PO, PRN was effective  Patient did write a letter to her  friend expressing her thoughts and feelings  Encouraged patient to continue to write letters expressing how she feels  Patient denies wanting to hurt herself

## 2018-09-16 NOTE — PROGRESS NOTES
Staff reported that patient made the comment "Im going to hit my head against the wall so I can wear the helmet "  When this RN went to investigate patients comment, patient stated "it was only a joke  I didn't mean it "  Explained to patient that is not the sort of thing you say on this unit  Explained we take it seriously  Patient put her hand over her eyes and would not respond to assessment questions  Educated patient on her 1:1 and using 1:1 to work through thoughts and feelings  Also educated patient on journalling  thoughts and feelings  Talked with patient about  friend  Suggested to patient she write a letter to her friend expressing thoughts and feelings  Also educated patient on grief counseling

## 2018-09-16 NOTE — PLAN OF CARE
Risk for Self Injury/Neglect     Treatment Goal: Remain safe during length of stay, learn and adopt new coping skills, and be free of self-injurious ideation, impulses and acts at the time of discharge Not Progressing     Verbalize thoughts and feelings Not Progressing     Recognize maladaptive responses and adopt new coping mechanisms Not Progressing        Risk for Violence/Aggression Toward Others     Treatment Goal: Refrain from acts of violence/aggression during length of stay, and demonstrate improved impulse control at the time of discharge Not Progressing     Verbalize thoughts and feelings Not Progressing     Identify appropriate positive anger management techniques Not Progressing          DISCHARGE PLANNING     Discharge to home or other facility with appropriate resources Progressing        Nutrition/Hydration-ADULT     Nutrient/Hydration intake appropriate for improving, restoring or maintaining nutritional needs Progressing        Risk for Self Injury/Neglect     Refrain from harming self Progressing        Risk for Violence/Aggression Toward Others     Refrain from harming others Progressing     Refrain from destructive acts on the environment or property Progressing     Control angry outbursts Progressing        SAFETY, RESTRAINT - VIOLENT/SELF-DESTRUCTIVE     Remains free of harm/injury from restraints (Restraint for Violent/Self-Destructive Behavior) Progressing     Returns to optimal restraint-free functioning Progressing

## 2018-09-16 NOTE — PROGRESS NOTES
Pt awoke requesting medication for her lower back pain  Pt given PRN Motrin 600mg and Zanaflex @ 0221  Medications appear to be effective as pt is currently sleeping

## 2018-09-16 NOTE — PROGRESS NOTES
Pt remains labile but has had a relatively calm evening  Pt did become upset around 1900 stating, "I need imodium, they said they were going to give it to me around noon  I've been waiting this whole time " Pt given PRN imodium at 1900 for diarrhea  Pt quickly calmed and has had no other episodes of agitation or attempts to harm herself  Pt remains on continual observation

## 2018-09-17 PROCEDURE — 99232 SBSQ HOSP IP/OBS MODERATE 35: CPT | Performed by: PSYCHIATRY & NEUROLOGY

## 2018-09-17 RX ADMIN — TRAZODONE HYDROCHLORIDE 200 MG: 100 TABLET ORAL at 21:13

## 2018-09-17 RX ADMIN — BUPROPION HYDROCHLORIDE 100 MG: 100 TABLET, FILM COATED, EXTENDED RELEASE ORAL at 17:58

## 2018-09-17 RX ADMIN — LURASIDONE HYDROCHLORIDE 120 MG: 80 TABLET, FILM COATED ORAL at 17:58

## 2018-09-17 RX ADMIN — PILOCARPINE HYDROCHLORIDE 5 MG: 5 TABLET, FILM COATED ORAL at 16:45

## 2018-09-17 RX ADMIN — BUSPIRONE HYDROCHLORIDE 30 MG: 10 TABLET ORAL at 09:27

## 2018-09-17 RX ADMIN — CHLORPROMAZINE HYDROCHLORIDE 50 MG: 50 TABLET, SUGAR COATED ORAL at 21:12

## 2018-09-17 RX ADMIN — LORAZEPAM 2 MG: 1 TABLET ORAL at 13:38

## 2018-09-17 RX ADMIN — BUPROPION HYDROCHLORIDE 100 MG: 100 TABLET, FILM COATED, EXTENDED RELEASE ORAL at 09:27

## 2018-09-17 RX ADMIN — PANTOPRAZOLE SODIUM 40 MG: 40 TABLET, DELAYED RELEASE ORAL at 07:09

## 2018-09-17 RX ADMIN — POTASSIUM CHLORIDE 20 MEQ: 1500 TABLET, EXTENDED RELEASE ORAL at 09:26

## 2018-09-17 RX ADMIN — CHLORPROMAZINE HYDROCHLORIDE 50 MG: 50 TABLET, SUGAR COATED ORAL at 16:45

## 2018-09-17 RX ADMIN — TOPIRAMATE 50 MG: 25 TABLET, FILM COATED ORAL at 09:26

## 2018-09-17 RX ADMIN — PILOCARPINE HYDROCHLORIDE 5 MG: 5 TABLET, FILM COATED ORAL at 21:12

## 2018-09-17 RX ADMIN — BACITRACIN ZINC 1 SMALL APPLICATION: 500 OINTMENT TOPICAL at 09:30

## 2018-09-17 RX ADMIN — LOSARTAN POTASSIUM 50 MG: 50 TABLET ORAL at 09:28

## 2018-09-17 RX ADMIN — FUROSEMIDE 20 MG: 20 TABLET ORAL at 09:28

## 2018-09-17 RX ADMIN — CHLORPROMAZINE HYDROCHLORIDE 25 MG: 25 TABLET, SUGAR COATED ORAL at 09:26

## 2018-09-17 RX ADMIN — METOPROLOL TARTRATE 50 MG: 50 TABLET, FILM COATED ORAL at 21:00

## 2018-09-17 RX ADMIN — BUSPIRONE HYDROCHLORIDE 30 MG: 10 TABLET ORAL at 17:59

## 2018-09-17 RX ADMIN — TOPIRAMATE 50 MG: 25 TABLET, FILM COATED ORAL at 17:59

## 2018-09-17 RX ADMIN — LIDOCAINE 1 PATCH: 50 PATCH CUTANEOUS at 09:29

## 2018-09-17 RX ADMIN — LEVOTHYROXINE SODIUM 250 MCG: 125 TABLET ORAL at 16:45

## 2018-09-17 RX ADMIN — METOPROLOL TARTRATE 50 MG: 50 TABLET, FILM COATED ORAL at 09:28

## 2018-09-17 RX ADMIN — ESTRADIOL 1 MG: 1 TABLET ORAL at 09:27

## 2018-09-17 RX ADMIN — PILOCARPINE HYDROCHLORIDE 5 MG: 5 TABLET, FILM COATED ORAL at 09:29

## 2018-09-17 NOTE — PLAN OF CARE

## 2018-09-17 NOTE — PROGRESS NOTES
Continuous observation discontinued at this time  Pt denies SI  Pt states that she was feeling frustrated because she doesn't know if her current ACT team will continue to follow her  Pt states that she wants to stay with them  Pt cooperative with her medication and unit routine

## 2018-09-17 NOTE — PROGRESS NOTES
Pt has remained calm and pleasant throughout the evening  Pt has had no outbursts or self abusive actions  Pt currently denies SI/HI  Pt only c/o diarrhea and back pain which she has received PRN's of motrin and imodium  Pt remains on continual observation, close proximity

## 2018-09-17 NOTE — PROGRESS NOTES
Pt anxious and upset about roommate  States the roommate isn't flushing the toilet  Pt given ativan 2mg po prn

## 2018-09-17 NOTE — PROGRESS NOTES
Progress Note - [address] [place] 56 y o  female MRN: @MRN   Unit/Bed#: GR9 014-62 Encounter: [id number]        The patient was seen for continuing care and reviewed with staff  Report from staff regarding this patient received and discussed, and records reviewed prior to seeing this patient   The patient felt improved, she still feels very anxious but able to tolerate her anxiety well without any agitation or need for p r n  Medications  Patient stated that she does not feel that she will harm himself and that her anger is no longer present, and her depression from being angry depression now turned to be anxious depression  The we had relatively long discussion of her ability to control all lost control over or her emotional state, and the patient was receptive there was no anger agitation was severe depression or anxiety is severe mood changes during our conversation decisions was made to stop 1 on 1 to support the patient independence in better control and better self reliance as a therapeutic approach to help the patient with borderline personality disorder to bipolar disorder, and will follow the patient in the hope that her improvement is stable and she indeed conned better control himself without need of being constantly observed as she stated she would  Sleep is improved  Appetite normal    Patient remains anxious and appropriate today       Medication side effects:no complaints  ROS: No     Mental Status Evaluation:    Appearance:  dressed in hospital attire   Behavior:  cooperative, calm   Mood:  improved, depressed, anxious   Affect: reactive, brighter, less labile, less constricted, constricted    Speech:  normal rate and volume, normal pitch   Language: appropriate   Thought Process:  concrete   Associations: concrete associations   Thought Content:  normal   Perceptual Disturbances: no auditory hallucinations, no visual hallucinations, denies auditory hallucinations when asked, does not appear responding to internal stimuli   Risk Potential: Suicidal ideation - None, contracts for safety on the unit  Homicidal ideation - None  Potential for aggression - No   Sensorium:  oriented to person, place and time   Memory:  recent and remote memory grossly intact   Consciousness:  alert and awake   Attention: attention span and concentration are normal   Intellect: normal   Fund of Knowledge: awareness of current events appropriate   Insight:  improving and impaired   Judgment: improving and impaired   Muscle Tone: normal   Gait/Station: normal gait/station and normal balance   Motor Activity: no abnormal movements         Laboratory results:  I have personally reviewed all pertinent laboratory results  Progress Toward Goals: improving gradually    Assessment/Plan   Principal Problem:    Schizoaffective disorder, bipolar type ([facility] [address] )      Recommended Treatment:   Because of the patient improved over weekend will continue the same medication observe the patient being off 1 on 1 now as a therapeutic approach to help the patient was borderline personality disorder to regain better control of herself a control  The patient stated that she would report to staff immediately of self harmful thoughts come back  Supportive therapy was provided in addition to discussion of her current medications and patient's was receptive  Planned medication and treatment changes: All current active medications have been reviewed  Continue treatment with group therapy, milieu therapy, occupational therapy and medication management  Risks / Benefits of Treatment:    Risks, benefits, and possible side effects of medications explained to patient and patient verbalizes understanding and agreement for treatment  Counseling / Coordination of Care:    Patient's progress discussed with staff in treatment team meeting  Medication changes reviewed with staff in treatment team meeting        ** Please Note: This note has been constructed using a voice recognition system   **

## 2018-09-17 NOTE — CASE MANAGEMENT
Left message this morning for pt's ICM Yolanda regarding pt's statements that her ICM in the [facility] Friday stated that the pt might be referred up to an ACT team and the pt does not want this  Awaiting call back from Adithya Brock regarding this issue

## 2018-09-18 ENCOUNTER — TELEPHONE (OUTPATIENT)
Dept: FAMILY MEDICINE CLINIC | Facility: CLINIC | Age: 48
End: 2018-09-18

## 2018-09-18 LAB
CHOLEST SERPL-MCNC: 180 MG/DL (ref 50–200)
HDLC SERPL-MCNC: 43 MG/DL (ref 40–60)
LDLC SERPL CALC-MCNC: 99 MG/DL (ref 0–100)
NONHDLC SERPL-MCNC: 137 MG/DL
TRIGL SERPL-MCNC: 191 MG/DL

## 2018-09-18 PROCEDURE — 99231 SBSQ HOSP IP/OBS SF/LOW 25: CPT | Performed by: PSYCHIATRY & NEUROLOGY

## 2018-09-18 PROCEDURE — 80061 LIPID PANEL: CPT | Performed by: PSYCHIATRY & NEUROLOGY

## 2018-09-18 RX ADMIN — LOSARTAN POTASSIUM 50 MG: 50 TABLET ORAL at 08:13

## 2018-09-18 RX ADMIN — ACETAMINOPHEN 650 MG: 325 TABLET, FILM COATED ORAL at 05:03

## 2018-09-18 RX ADMIN — TRAZODONE HYDROCHLORIDE 200 MG: 100 TABLET ORAL at 21:13

## 2018-09-18 RX ADMIN — CHLORPROMAZINE HYDROCHLORIDE 50 MG: 50 TABLET, SUGAR COATED ORAL at 16:18

## 2018-09-18 RX ADMIN — POTASSIUM CHLORIDE 20 MEQ: 1500 TABLET, EXTENDED RELEASE ORAL at 08:12

## 2018-09-18 RX ADMIN — TOPIRAMATE 50 MG: 25 TABLET, FILM COATED ORAL at 18:25

## 2018-09-18 RX ADMIN — BACITRACIN ZINC 1 SMALL APPLICATION: 500 OINTMENT TOPICAL at 08:13

## 2018-09-18 RX ADMIN — ESTRADIOL 1 MG: 1 TABLET ORAL at 08:10

## 2018-09-18 RX ADMIN — BUSPIRONE HYDROCHLORIDE 30 MG: 10 TABLET ORAL at 18:24

## 2018-09-18 RX ADMIN — TOPIRAMATE 50 MG: 25 TABLET, FILM COATED ORAL at 08:12

## 2018-09-18 RX ADMIN — BACITRACIN ZINC 1 SMALL APPLICATION: 500 OINTMENT TOPICAL at 18:25

## 2018-09-18 RX ADMIN — FUROSEMIDE 20 MG: 20 TABLET ORAL at 08:15

## 2018-09-18 RX ADMIN — CHLORPROMAZINE HYDROCHLORIDE 50 MG: 50 TABLET, SUGAR COATED ORAL at 20:55

## 2018-09-18 RX ADMIN — LEVOTHYROXINE SODIUM 250 MCG: 125 TABLET ORAL at 16:18

## 2018-09-18 RX ADMIN — PANTOPRAZOLE SODIUM 40 MG: 40 TABLET, DELAYED RELEASE ORAL at 05:04

## 2018-09-18 RX ADMIN — BUPROPION HYDROCHLORIDE 100 MG: 100 TABLET, FILM COATED, EXTENDED RELEASE ORAL at 18:24

## 2018-09-18 RX ADMIN — PILOCARPINE HYDROCHLORIDE 5 MG: 5 TABLET, FILM COATED ORAL at 16:18

## 2018-09-18 RX ADMIN — LIDOCAINE 1 PATCH: 50 PATCH CUTANEOUS at 08:10

## 2018-09-18 RX ADMIN — BUSPIRONE HYDROCHLORIDE 30 MG: 10 TABLET ORAL at 08:11

## 2018-09-18 RX ADMIN — CHLORPROMAZINE HYDROCHLORIDE 25 MG: 25 TABLET, SUGAR COATED ORAL at 08:12

## 2018-09-18 RX ADMIN — METOPROLOL TARTRATE 50 MG: 50 TABLET, FILM COATED ORAL at 08:11

## 2018-09-18 RX ADMIN — IBUPROFEN 600 MG: 600 TABLET, FILM COATED ORAL at 08:21

## 2018-09-18 RX ADMIN — AMLODIPINE BESYLATE 10 MG: 10 TABLET ORAL at 21:56

## 2018-09-18 RX ADMIN — LURASIDONE HYDROCHLORIDE 120 MG: 80 TABLET, FILM COATED ORAL at 18:24

## 2018-09-18 RX ADMIN — LORAZEPAM 2 MG: 1 TABLET ORAL at 09:07

## 2018-09-18 RX ADMIN — PILOCARPINE HYDROCHLORIDE 5 MG: 5 TABLET, FILM COATED ORAL at 08:10

## 2018-09-18 RX ADMIN — PILOCARPINE HYDROCHLORIDE 5 MG: 5 TABLET, FILM COATED ORAL at 20:54

## 2018-09-18 RX ADMIN — BUPROPION HYDROCHLORIDE 100 MG: 100 TABLET, FILM COATED, EXTENDED RELEASE ORAL at 08:12

## 2018-09-18 RX ADMIN — METOPROLOL TARTRATE 50 MG: 50 TABLET, FILM COATED ORAL at 20:55

## 2018-09-18 NOTE — PROGRESS NOTES
Pt received Ativan 2 mg po prn because she was upset and crying because she was unable;e to use washing machine  Pt c/o other patients not removing their items and how it's not fair to others who are waiting

## 2018-09-18 NOTE — PROGRESS NOTES
Pt crying, becoming agitated, anxious, complaining about nobody paying attention to her and not helping her with her laundry  Requested prn for anxiety; PO Ativan given  Will monitor

## 2018-09-18 NOTE — TELEPHONE ENCOUNTER
Thiago Tirado called from [place] [id number] case management stating the patient is getting discharged tomorrow  She was there for behavorial health and is scheduled to see Bea next week

## 2018-09-18 NOTE — CASE MANAGEMENT
Pt is pleasant, verbal, bright affect, attending groups, states she wants to be discharged tomorrow and feels good about it  Pt continues to work on her CBT worksheets and is also journaling and reports she feels better when she is busy  Pt's MARGIE Luciano called and was notified that pt is being discharged tomorrow 9/19/18 and asked if she plans to pick the pt up; awaiting a call back

## 2018-09-18 NOTE — PROGRESS NOTES
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 in slightly upbeat rather than depressed mood  Sleep is better, on CPAP  Appetite normal    Patient remains anxious, appropriate, depressed and doing better today       Medication side effects:no complaints  ROS: No     Mental Status Evaluation:    Appearance:  dressed appropriately, casually dressed   Behavior:  calm   Mood:  improved, depressed, anxious   Affect: reactive, brighter, less labile, less constricted    Speech:  decreased rate   Language: appropriate   Thought Process:  concrete   Associations: concrete associations   Thought Content:  negative thinking   Perceptual Disturbances: no auditory hallucinations, no visual hallucinations, denies auditory hallucinations when asked, does not appear responding to internal stimuli   Risk Potential: Suicidal ideation - None, contracts for safety on the unit  Homicidal ideation - None  Potential for aggression - No   Sensorium:  oriented to person, place and time   Memory:  recent and remote memory grossly intact   Consciousness:  alert and awake   Attention: attention span and concentration are normal   Intellect: normal   Fund of Knowledge: awareness of current events appropriate   Insight:  improving   Judgment: improved   Muscle Tone: normal   Gait/Station: normal gait/station and normal balance   Motor Activity: no abnormal movements         Laboratory results:  I have personally reviewed all pertinent laboratory results  Progress Toward Goals: improving slowly    Assessment/Plan   Principal Problem:    Schizoaffective disorder, bipolar type (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 and felt be ready and be safe to be discharged tomorrow  Planned medication and treatment changes: All current active medications have been reviewed  Continue treatment with group therapy, milieu therapy, occupational therapy and medication management  Risks / Benefits of Treatment:    Risks, benefits, and possible side effects of medications explained to patient and patient verbalizes understanding and agreement for treatment  Counseling / Coordination of Care:    Patient's progress discussed with staff in treatment team meeting  Medication changes reviewed with staff in treatment team meeting  ** Please Note: This note has been constructed using a voice recognition system   **

## 2018-09-18 NOTE — PROGRESS NOTES
Calm, cooperative, pleasant with appropriate peers  Verbal about difficulties she has been facing  Amlodipine not given at 2200 - parameters not met

## 2018-09-18 NOTE — PROGRESS NOTES
Pt 's mood is labile  Pt irritable this afternoon because she wanted to call her mom because she was being d/c tomorrow  Pt made aware that phones go back on at 16:00  Pt not talking to this writer  Wont answer questions about SI or how she is feeling  No attempts to harm self

## 2018-09-19 VITALS
DIASTOLIC BLOOD PRESSURE: 62 MMHG | HEART RATE: 66 BPM | RESPIRATION RATE: 16 BRPM | OXYGEN SATURATION: 96 % | HEIGHT: 67 IN | TEMPERATURE: 97.5 F | SYSTOLIC BLOOD PRESSURE: 137 MMHG | WEIGHT: 293 LBS | BODY MASS INDEX: 45.99 KG/M2

## 2018-09-19 PROCEDURE — 99239 HOSP IP/OBS DSCHRG MGMT >30: CPT | Performed by: PSYCHIATRY & NEUROLOGY

## 2018-09-19 RX ORDER — TOPIRAMATE 50 MG/1
50 TABLET, FILM COATED ORAL DAILY
Qty: 30 TABLET | Refills: 1 | Status: SHIPPED | OUTPATIENT
Start: 2018-09-20 | End: 2018-09-26

## 2018-09-19 RX ORDER — TIZANIDINE 4 MG/1
4 TABLET ORAL 3 TIMES DAILY PRN
Qty: 45 TABLET | Refills: 3 | Status: SHIPPED | OUTPATIENT
Start: 2018-09-19 | End: 2019-05-30 | Stop reason: HOSPADM

## 2018-09-19 RX ORDER — AMLODIPINE BESYLATE 10 MG/1
10 TABLET ORAL
Qty: 30 TABLET | Refills: 1 | Status: SHIPPED | OUTPATIENT
Start: 2018-09-19 | End: 2018-09-26 | Stop reason: SDUPTHER

## 2018-09-19 RX ORDER — LEVOTHYROXINE SODIUM 0.12 MG/1
250 TABLET ORAL
Qty: 60 TABLET | Refills: 1 | Status: SHIPPED | OUTPATIENT
Start: 2018-09-19 | End: 2019-02-06 | Stop reason: SDUPTHER

## 2018-09-19 RX ORDER — LIDOCAINE 50 MG/G
1 PATCH TOPICAL DAILY
Qty: 30 PATCH | Refills: 0 | Status: SHIPPED | OUTPATIENT
Start: 2018-09-20 | End: 2019-04-19

## 2018-09-19 RX ORDER — BUSPIRONE HYDROCHLORIDE 30 MG/1
30 TABLET ORAL 2 TIMES DAILY
Qty: 60 TABLET | Refills: 1 | Status: SHIPPED | OUTPATIENT
Start: 2018-09-19 | End: 2019-04-08

## 2018-09-19 RX ORDER — CHLORPROMAZINE HYDROCHLORIDE 50 MG/1
25 TABLET, FILM COATED ORAL DAILY
Qty: 15 TABLET | Refills: 1 | Status: SHIPPED | OUTPATIENT
Start: 2018-09-20 | End: 2018-11-27 | Stop reason: ALTCHOICE

## 2018-09-19 RX ORDER — BUPROPION HYDROCHLORIDE 100 MG/1
100 TABLET, EXTENDED RELEASE ORAL 2 TIMES DAILY
Qty: 60 TABLET | Refills: 1 | Status: SHIPPED | OUTPATIENT
Start: 2018-09-19 | End: 2019-04-08

## 2018-09-19 RX ORDER — CHLORPROMAZINE HYDROCHLORIDE 50 MG/1
50 TABLET, FILM COATED ORAL 2 TIMES DAILY
Qty: 60 TABLET | Refills: 1 | Status: SHIPPED | OUTPATIENT
Start: 2018-09-19 | End: 2018-09-26 | Stop reason: RX

## 2018-09-19 RX ORDER — TOPIRAMATE 50 MG/1
50 TABLET, FILM COATED ORAL
Qty: 30 TABLET | Refills: 1 | Status: SHIPPED | OUTPATIENT
Start: 2018-09-19 | End: 2018-09-26 | Stop reason: SDUPTHER

## 2018-09-19 RX ORDER — TRAZODONE HYDROCHLORIDE 100 MG/1
200 TABLET ORAL
Qty: 60 TABLET | Refills: 1 | Status: ON HOLD | OUTPATIENT
Start: 2018-09-19 | End: 2019-05-30

## 2018-09-19 RX ORDER — PANTOPRAZOLE SODIUM 40 MG/1
40 TABLET, DELAYED RELEASE ORAL
Qty: 30 TABLET | Refills: 0 | Status: SHIPPED | OUTPATIENT
Start: 2018-09-20 | End: 2018-11-27 | Stop reason: ALTCHOICE

## 2018-09-19 RX ADMIN — TOPIRAMATE 50 MG: 25 TABLET, FILM COATED ORAL at 08:24

## 2018-09-19 RX ADMIN — TIZANIDINE 4 MG: 4 TABLET ORAL at 06:25

## 2018-09-19 RX ADMIN — IBUPROFEN 600 MG: 600 TABLET, FILM COATED ORAL at 06:25

## 2018-09-19 RX ADMIN — FUROSEMIDE 20 MG: 20 TABLET ORAL at 08:23

## 2018-09-19 RX ADMIN — METOPROLOL TARTRATE 50 MG: 50 TABLET, FILM COATED ORAL at 08:24

## 2018-09-19 RX ADMIN — BUPROPION HYDROCHLORIDE 100 MG: 100 TABLET, FILM COATED, EXTENDED RELEASE ORAL at 08:23

## 2018-09-19 RX ADMIN — PANTOPRAZOLE SODIUM 40 MG: 40 TABLET, DELAYED RELEASE ORAL at 06:25

## 2018-09-19 RX ADMIN — LOSARTAN POTASSIUM 50 MG: 50 TABLET ORAL at 08:24

## 2018-09-19 RX ADMIN — CHLORPROMAZINE HYDROCHLORIDE 25 MG: 25 TABLET, SUGAR COATED ORAL at 08:23

## 2018-09-19 RX ADMIN — LIDOCAINE 1 PATCH: 50 PATCH CUTANEOUS at 08:24

## 2018-09-19 RX ADMIN — POTASSIUM CHLORIDE 20 MEQ: 1500 TABLET, EXTENDED RELEASE ORAL at 08:24

## 2018-09-19 RX ADMIN — BUSPIRONE HYDROCHLORIDE 30 MG: 10 TABLET ORAL at 08:24

## 2018-09-19 RX ADMIN — PILOCARPINE HYDROCHLORIDE 5 MG: 5 TABLET, FILM COATED ORAL at 08:23

## 2018-09-19 RX ADMIN — ESTRADIOL 1 MG: 1 TABLET ORAL at 08:23

## 2018-09-19 RX ADMIN — BACITRACIN ZINC 1 SMALL APPLICATION: 500 OINTMENT TOPICAL at 08:23

## 2018-09-19 NOTE — PLAN OF CARE
Problem: DISCHARGE PLANNING  Goal: Discharge to home or other facility with appropriate resources  INTERVENTIONS:  - Identify barriers to discharge w/patient and caregiver  - Arrange for needed discharge resources and transportation as appropriate  - Identify discharge learning needs (meds, wound care, etc )  - Arrange for interpretive services to assist at discharge as needed  - Refer to Case Management Department for coordinating discharge planning if the patient needs post-hospital services based on physician/advanced practitioner order or complex needs related to functional status, cognitive ability, or social support system   Outcome: Completed Date Met: 09/19/18

## 2018-09-19 NOTE — DISCHARGE INSTR - APPOINTMENTS
Contact Information: If you have any questions, concerns, pended studies, tests and/or procedures, or emergencies regarding your inpatient behavioral health visit  Please contact [address] behavioral health unit [phone] and ask to speak to a , nurse or physician  A contact is available 24 hours/ 7 days a week at this number  Summary of Procedures Performed During your Stay:  Below is a list of major procedures performed during your hospital stay and a summary of results:  - No major procedures performed  Pending Studies     None        If studies are pending at discharge, follow up with your PCP and/or referring provider

## 2018-09-19 NOTE — CMS CERTIFICATION NOTE
Certification: Based upon physical, mental and social evaluations, I certify that inpatient psychiatric services continue to be medically necessary for this patient for a duration of 20 midnights for the treatment of Schizoaffecive Disorder  Available alternative community resources still do not meet the patient's mental health care needs  I further attest that an established written individualized plan of care has been updated and is outlined in the patient's medical records

## 2018-09-19 NOTE — PROGRESS NOTES
Patient denies SI/HI and denies any hallucinations at this time  Patient showed me her left wrist healing wound that was open to air  Patient refused bacitracin because the area is closed and healing  Patient was bright upon approach and thoughts clear and concise  Patient looking forward to discharge and told me that case management needs to set up a ride for her to leave  Patient was out in milieu and interacting well with both patients and staff

## 2018-09-19 NOTE — PLAN OF CARE
Problem: Nutrition/Hydration-ADULT  Goal: Nutrient/Hydration intake appropriate for improving, restoring or maintaining nutritional needs  Monitor and assess patient's nutrition/hydration status for malnutrition (ex- brittle hair, bruises, dry skin, pale skin and conjunctiva, muscle wasting, smooth red tongue, and disorientation)  Collaborate with interdisciplinary team and initiate plan and interventions as ordered  Monitor patient's weight and dietary intake as ordered or per policy  Utilize nutrition screening tool and intervene per policy  Determine patient's food preferences and provide high-protein, high-caloric foods as appropriate       INTERVENTIONS:  - Monitor oral intake, urinary output, labs, and treatment plans  - Assess nutrition and hydration status and recommend course of action  - Evaluate amount of meals eaten  - Assist patient with eating if necessary   - Allow adequate time for meals  - Recommend/ encourage appropriate diets, oral nutritional supplements, and vitamin/mineral supplements  - Order, calculate, and assess calorie counts as needed  - Recommend, monitor, and adjust tube feedings and TPN/PPN based on assessed needs  - Assess need for intravenous fluids  - Provide specific nutrition/hydration education as appropriate  - Include patient/family/caregiver in decisions related to nutrition   Outcome: Progressing      Problem: SAFETY, RESTRAINT - VIOLENT/SELF-DESTRUCTIVE  Goal: Remains free of harm/injury from restraints (Restraint for Violent/Self-Destructive Behavior)  INTERVENTIONS:  - Instruct patient/family regarding restraint use   - Assess and monitor physiologic and psychological status   - Provide interventions and comfort measures to meet assessed patient needs   - Ensure continuous in person monitoring is provided   - Identify and implement measures to help patient regain control  - Assess readiness for release of restraint   Outcome: Progressing    Goal: Returns to optimal restraint-free functioning  INTERVENTIONS:  - Assess the patient's behavior and symptoms that indicate continued need for restraint  - Identify and implement measures to help patient regain control  - Assess readiness for release of restraint    Outcome: Progressing      Problem: Risk for Self Injury/Neglect  Goal: Treatment Goal: Remain safe during length of stay, learn and adopt new coping skills, and be free of self-injurious ideation, impulses and acts at the time of discharge  Outcome: Progressing    Goal: Verbalize thoughts and feelings  Interventions:  - Assess and re-assess patient's lethality and potential for self-injury  - Engage patient in 1:1 interactions, daily, for a minimum of 15 minutes  - Encourage patient to express feelings, fears, frustrations, hopes  - Establish rapport/trust with patient    Outcome: Progressing    Goal: Refrain from harming self  Interventions:  - Monitor patient closely, per order  - Develop a trusting relationship  - Supervise medication ingestion, monitor effects and side effects    Outcome: Progressing    Goal: Recognize maladaptive responses and adopt new coping mechanisms  Outcome: Progressing      Problem: Risk for Violence/Aggression Toward Others  Goal: Treatment Goal: Refrain from acts of violence/aggression during length of stay, and demonstrate improved impulse control at the time of discharge  Outcome: Progressing    Goal: Verbalize thoughts and feelings  Interventions:  - Assess and re-assess patient's level of risk, every waking shift  - Engage patient in 1:1 interactions, daily, for a minimum of 15 minutes   - Allow patient to express feelings and frustrations in a safe and non-threatening manner   - Establish rapport/trust with patient    Outcome: Progressing    Goal: Refrain from harming others  Outcome: Progressing    Goal: Refrain from destructive acts on the environment or property  Outcome: Progressing    Goal: Control angry outbursts  Interventions:  - Monitor patient closely, per order  - Ensure early verbal de-escalation  - Monitor prn medication needs  - Set reasonable/therapeutic limits, outline behavioral expectations, and consequences   - Provide a non-threatening milieu, utilizing the least restrictive interventions    Outcome: Progressing    Goal: Identify appropriate positive anger management techniques  Interventions:  - Offer anger management and coping skills groups   - Staff will provide positive feedback for appropriate anger control   Outcome: Progressing      Comments: Patient has improved with both mood and behavior this day

## 2018-09-19 NOTE — PROGRESS NOTES
She has been in the milieu most of the evening  Appropriate with peers  More concerned with herself and the issues she has to face when she leaves here e g  Housing, her mother's poor health

## 2018-09-19 NOTE — CASE MANAGEMENT
Pt states she is ready for discharge and made social plans with her family for this afternoon and also is excited to see her cat  Pt is not aggressive, denies any ideations of self harm, states she is motivated to continue her outpt mental health care with a focus upon her therapy  Pt feels she has greatly improved emotionally since her admit here  Pt will be transported home via [address] [place] [address] transport van   Pt's [facility] Andr[facility] Mins states she will see pt on Thursday, 9/20/18 at 2:00pm

## 2018-09-21 NOTE — PROGRESS NOTES
Patient has been placed in 4 point restraint following an episode of poor impulse control and self abusive behavior - hitting her head off wall - which was followed by her becoming combative when staff and security intervened

## 2018-09-21 NOTE — PROGRESS NOTES
Patient is more cooperative - she is no longer trying to get out of the restraints or to tip the bed

## 2018-09-24 NOTE — DISCHARGE SUMMARY
Discharge Summary - [id number] Kody Nam 56 y o  female MRN: [id number]  Unit/Bed#: [facility] Encounter: [id number]     Admission Date: 9/11/2018         Discharge Date: 9/19/2018      Attending Psychiatrist: MEET Ferraro     Reason for Admission/HPI:     Kushal Talbert is a 56 y o  female with a history of adjustment disorder, bipolar disorder versus schizoaffective disorder, psychosis and anxiety who was admitted to the inpatient psychiatric unit on a voluntary 12 commitment basis due to depression, anxiety, mixed symptoms of bipolar disorder and inability to care for self because of mental illness      Symptoms prior to admission included worsening depression, depression, feeling depressed, suicidal behavior, suicidal ideation and feeling suicidal  Onset of symptoms was abrupt starting several days ago with rapidly worsening course since that time  Stressors preceding admission included medical problems, limited support, social difficulties, everyday stressors and ongoing anxiety       On initial evaluation after admission to the inpatient psychiatric unit the patient tense, but calm, felt sleepy however at times was fully alert when discussed her medications  The the patient was not acutely agitated  Stated that she had suicidal thoughts, but did not endorse any active plan of killing herself      The patient had multiple inpatient treatment because of suicidal ideations the the in the last 27 years since age 14 stated that she had at least 10 suicidal attempts  Hospital Course:       Ms Kody Nam was admitted and all appropriate precautions were started  Pt was oriented to the unit  Her treatment, including medication management and therapy was discussed with the patient, and  she agreed  Risks, benefits, and possible side effects of medications explained to patient and patient verbalizes understanding and agreement for treatment       During the hospitalization the patient was encouraged to attend individual therapy, group therapy, milieu therapy and occupational therapy  Patient condition was initially a was dramatic, dangers, and deeply disturbed  The patient made several attempts to harm herself including banging her head the tried to suffocate by material covered her superficial wound on her wrist, had to be placed on 1 on 1 in paper garment  When Thorazine was started and dose increased to 25 in the morning and 50 2 more times a day the patient condition dramatically and rapidly improved  The rest of her medications included combination of Latuda that was increased to 120 milligrams  The patient was also continued topiramate Wellbutrin and his physical medications    Pt agreed to start her medication after discussion of potential benefits and side effects  She participated in therapy  Pt  sleep improved and appetite improved as well  Individual CBT based  therapy was provided to deal with cognitive-behavioural aspects of self-perception, damage self image, and improving self-esteem  Tolerated medications without side effects  Vitals were stable     I discussed the medication regimen and possible side effects of the medications with the patient prior to discharge,  At the time of discharge Ms Mason was tolerating psychiatric medications  Please see After Discharge Summary for a completed list of discharge medications  Safety plan was discussed and approved by the patient  She was not manic, depressed, angry, or confused or psychotic on a day of discharge and was accountable for her actions  I discussed outpatient follow up with the patient, was arranged by the unit  upon discharge  Safety plan was discussed and approved by Ms Kody [address]  Reviewed with the patient importance of compliance with medications and outpatient treatment after discharge  The patient was competent to understand of risks and benefits of her actions         Mental Status at time of Discharge:     Mental Status Evaluation:    Appearance:  dressed appropriately, casually dressed   Behavior:  normal, cooperative, calm   Mood:  improved, anxious   Affect: appropriate, reactive, brighter    Speech:  normal rate and volume, normal pitch   Language: appropriate   Thought Process:  normal and logical   Associations: concrete associations   Thought Content:  normal   Perceptual Disturbances: no auditory hallucinations, no visual hallucinations, denies auditory hallucinations when asked, does not appear responding to internal stimuli   Risk Potential: Suicidal ideation - None  Homicidal ideation - None  Potential for aggression - No   Sensorium:  oriented to person, place and time   Memory:  recent and remote memory grossly intact   Consciousness:  alert and awake   Attention: attention span and concentration are normal   Intellect: normal   Fund of Knowledge: awareness of current events appropriate   Insight:  improved   Judgment: improved   Muscle Tone: normal   Gait/Station: normal gait/station and normal balance   Motor Activity: no abnormal movements         Discharge Diagnosis:   Patient Active Problem List   Diagnosis    Asthma    Yan's esophagus determined by biopsy    Benign essential hypertension    Schizoaffective disorder, bipolar type ([facility] [address] )    Chronic low back pain    DDD (degenerative disc disease), lumbar    Dysphagia    Edema    Family history of renal cancer    Headache    Hypothyroidism    Microhematuria    Morbid obesity ([address] )    MAG (obstructive sleep apnea)    Precordial pain    Spondylosis of lumbosacral joint without myelopathy    Stress incontinence in female    Abdominal pain    Hypertension    Overactive bladder    Primary osteoarthritis of both knees    Urinary incontinence       Discharge Medications:  See after visit summary for reconciled discharge medications provided to patient and family        Discharge instructions/Information to patient and family:   See after visit summary for information provided to patient and family  Provisions for Follow-Up Care:  See after visit summary for information related to follow-up care and any pertinent home health orders  Discharge Statement   I spent 35 minutes discharging the patient  This time was spent on the day of discharge  I had direct contact with the patient on the day of discharge  Additional documentation is required if more than 30 minutes were spent on discharge  Portions of the record may have been created with voice recognition software

## 2018-09-26 ENCOUNTER — OFFICE VISIT (OUTPATIENT)
Dept: FAMILY MEDICINE CLINIC | Facility: CLINIC | Age: 48
End: 2018-09-26
Payer: MEDICARE

## 2018-09-26 DIAGNOSIS — I10 HYPERTENSION, UNSPECIFIED TYPE: ICD-10-CM

## 2018-09-26 DIAGNOSIS — R51.9 NONINTRACTABLE HEADACHE, UNSPECIFIED CHRONICITY PATTERN, UNSPECIFIED HEADACHE TYPE: Primary | ICD-10-CM

## 2018-09-26 DIAGNOSIS — F25.0 SCHIZOAFFECTIVE DISORDER, BIPOLAR TYPE (HCC): Chronic | ICD-10-CM

## 2018-09-26 PROCEDURE — 99213 OFFICE O/P EST LOW 20 MIN: CPT | Performed by: NURSE PRACTITIONER

## 2018-09-26 RX ORDER — LURASIDONE HYDROCHLORIDE 80 MG/1
80 TABLET, FILM COATED ORAL DAILY
Refills: 0 | COMMUNITY
Start: 2018-09-03 | End: 2018-09-26 | Stop reason: ALTCHOICE

## 2018-09-26 RX ORDER — SULFAMETHOXAZOLE AND TRIMETHOPRIM 800; 160 MG/1; MG/1
TABLET ORAL
Refills: 0 | COMMUNITY
Start: 2018-09-05 | End: 2018-09-26 | Stop reason: ALTCHOICE

## 2018-09-26 RX ORDER — HYDROCODONE BITARTRATE AND ACETAMINOPHEN 5; 325 MG/1; MG/1
TABLET ORAL
COMMUNITY
Start: 2018-08-31 | End: 2018-11-27 | Stop reason: ALTCHOICE

## 2018-09-26 RX ORDER — FLUCONAZOLE 150 MG/1
TABLET ORAL
Refills: 0 | COMMUNITY
Start: 2018-09-05 | End: 2018-09-26 | Stop reason: ALTCHOICE

## 2018-09-26 RX ORDER — TOPIRAMATE 50 MG/1
50 TABLET, FILM COATED ORAL 2 TIMES DAILY
Qty: 60 TABLET | Refills: 3 | Status: SHIPPED | OUTPATIENT
Start: 2018-09-26 | End: 2018-11-01 | Stop reason: SDUPTHER

## 2018-09-26 RX ORDER — CHLORPROMAZINE HYDROCHLORIDE 25 MG/1
TABLET, FILM COATED ORAL
Refills: 1 | COMMUNITY
Start: 2018-09-20 | End: 2019-04-08

## 2018-09-26 RX ORDER — AMLODIPINE BESYLATE 10 MG/1
10 TABLET ORAL
Qty: 30 TABLET | Refills: 1 | Status: SHIPPED | OUTPATIENT
Start: 2018-09-26 | End: 2019-02-06 | Stop reason: SDUPTHER

## 2018-09-26 RX ORDER — MONTELUKAST SODIUM 4 MG/1
1 TABLET, CHEWABLE ORAL 2 TIMES DAILY
Refills: 2 | COMMUNITY
Start: 2018-09-25 | End: 2018-11-27 | Stop reason: ALTCHOICE

## 2018-09-26 NOTE — PROGRESS NOTES
Assessment/Plan:    No problem-specific Assessment & Plan notes found for this encounter  Diagnoses and all orders for this visit:    Nonintractable headache, unspecified chronicity pattern, unspecified headache type  -     topiramate (TOPAMAX) 50 MG tablet; Take 1 tablet (50 mg total) by mouth 2 (two) times a day  Will increase dose    If not helping in next 2 weeks will need referral to neurology  Schizoaffective disorder, bipolar type ([facility] [address] )    Other orders  -     Discontinue: fluconazole (DIFLUCAN) 150 mg tablet; TAKE 1 TABLET (150 MG TOTAL) BY MOUTH ONE TIME FOR 1 DOSE  REPEAT DOSE IN 3 DAYS   -     HYDROcodone-acetaminophen (NORCO) 5-325 mg per tablet; Earliest Fill Date: 8/31/18   -     Discontinue: LATUDA 80 MG tablet; Take 80 mg by mouth daily With food  -     Discontinue: sulfamethoxazole-trimethoprim (BACTRIM DS) 800-160 mg per tablet; TAKE 1 TABLET (160 MG OF TRIMETHOPRIM TOTAL) BY MOUTH 2 (TWO) TIMES A DAY FOR 5 DAYS  -     chlorproMAZINE (THORAZINE) 25 mg tablet; TAKE 1 TABLET BY MOUTH AT 9 00 IN THE MORNING  -     colestipol (COLESTID) 1 g tablet; Take 1 g by mouth 2 (two) times a day          Subjective:      Patient ID: Lexi Lozano is a 56 y o  female  HPI  Was recently in the hospital for mental health issues   Had a fight with her best friend  They are now not talking  Headaches:   Notes when out of apartment , which she thinks has mold, she has terrible migraines  Was in hospital and while she had a headache it was very mild  On topamax taking 25 mg twice a day  The following portions of the patient's history were reviewed and updated as appropriate: allergies, current medications, past family history, past medical history, past social history, past surgical history and problem list     Review of Systems   Constitutional: Negative for activity change, appetite change and fatigue  HENT: Negative for congestion, ear pain, rhinorrhea, sneezing and sore throat  Respiratory: Negative for cough, chest tightness and shortness of breath  Cardiovascular: Negative for chest pain  Gastrointestinal: Negative for abdominal pain, constipation, diarrhea and nausea  Neurological: Positive for headaches  Negative for dizziness, light-headedness and numbness  Psychiatric/Behavioral: Negative for confusion  Objective: There were no vitals filed for this visit  Physical Exam   Constitutional: She appears well-developed and well-nourished  Psychiatric: Her speech is normal and behavior is normal  Thought content normal  Cognition and memory are normal  She exhibits a depressed mood

## 2018-10-03 ENCOUNTER — TELEPHONE (OUTPATIENT)
Dept: FAMILY MEDICINE CLINIC | Facility: CLINIC | Age: 48
End: 2018-10-03

## 2018-10-03 DIAGNOSIS — G89.29 CHRONIC NONINTRACTABLE HEADACHE, UNSPECIFIED HEADACHE TYPE: Primary | ICD-10-CM

## 2018-10-03 DIAGNOSIS — R51.9 CHRONIC NONINTRACTABLE HEADACHE, UNSPECIFIED HEADACHE TYPE: Primary | ICD-10-CM

## 2018-10-03 NOTE — TELEPHONE ENCOUNTER
Sounds like our increased topamax not doing trick    Sounds like migraine   If not better and terrible to ER   If ok I will do a referral over to neurology

## 2018-10-03 NOTE — TELEPHONE ENCOUNTER
Pt is calling because her headaches are worst  And its having a hard time with light and haven't been out of the house I offer appt she will like to know what you recommend is something on going thanks

## 2018-10-05 ENCOUNTER — TELEPHONE (OUTPATIENT)
Dept: FAMILY MEDICINE CLINIC | Facility: CLINIC | Age: 48
End: 2018-10-05

## 2018-10-05 DIAGNOSIS — R51.9 NONINTRACTABLE HEADACHE, UNSPECIFIED CHRONICITY PATTERN, UNSPECIFIED HEADACHE TYPE: Primary | ICD-10-CM

## 2018-10-05 NOTE — TELEPHONE ENCOUNTER
Patient called she said that she was referred to see dr CISNEROS [facility] neurology and they cant get her in until 1/29/18 she said that she cant wait that long to be seen pt can be reached at [phone]     TY

## 2018-10-10 DIAGNOSIS — F25.0 SCHIZOAFFECTIVE DISORDER, BIPOLAR TYPE (HCC): Chronic | ICD-10-CM

## 2018-10-10 RX ORDER — OMEPRAZOLE 20 MG/1
20 CAPSULE, DELAYED RELEASE ORAL 2 TIMES DAILY
Refills: 12 | COMMUNITY
Start: 2018-10-10 | End: 2018-11-27 | Stop reason: ALTCHOICE

## 2018-10-10 NOTE — TELEPHONE ENCOUNTER
Patient called she said that she needs a RX for her Latuda 120mg called into [place] [facility] at [phone] she said that she doesn't get in to see her new bárbara Escobedo until  10/31/18    TY

## 2018-11-01 ENCOUNTER — OFFICE VISIT (OUTPATIENT)
Dept: NEUROLOGY | Facility: CLINIC | Age: 48
End: 2018-11-01
Payer: MEDICARE

## 2018-11-01 VITALS
WEIGHT: 293 LBS | SYSTOLIC BLOOD PRESSURE: 134 MMHG | RESPIRATION RATE: 18 BRPM | DIASTOLIC BLOOD PRESSURE: 76 MMHG | HEART RATE: 62 BPM | BODY MASS INDEX: 48.82 KG/M2 | HEIGHT: 65 IN

## 2018-11-01 DIAGNOSIS — R51.9 NONINTRACTABLE HEADACHE, UNSPECIFIED CHRONICITY PATTERN, UNSPECIFIED HEADACHE TYPE: ICD-10-CM

## 2018-11-01 DIAGNOSIS — G43.019 INTRACTABLE MIGRAINE WITHOUT AURA AND WITHOUT STATUS MIGRAINOSUS: Primary | ICD-10-CM

## 2018-11-01 PROCEDURE — 99203 OFFICE O/P NEW LOW 30 MIN: CPT | Performed by: PSYCHIATRY & NEUROLOGY

## 2018-11-01 RX ORDER — TOPIRAMATE 50 MG/1
TABLET, FILM COATED ORAL
Qty: 90 TABLET | Refills: 2 | Status: SHIPPED | OUTPATIENT
Start: 2018-11-01 | End: 2019-04-08

## 2018-11-01 NOTE — ASSESSMENT & PLAN NOTE
Patient presents a history consistent with migraine without aura  Unfortunately, in the recent months, the circumstance has been one of multiple weekly episodes with between a persistence of tension-type symptoms  Fortunately, she has had some ongoing improvement with the introduction of topiramate presently at 50 mg twice daily  Tolerating with no reported adverse side effects and hopefully will have further improvement as the topiramate is advanced  --increase topiramate using 50 mg tablets to 1 5 tablets twice daily  --patient asked to maintain a headache diary  --patient made aware of the overuse of analgesics creating the environment for accentuated headache  She has been very judicious in their use and will continue to be so  --complete recent blood work with ALT and AST  --asked to call the office in two weeks with a status report and to discuss possible further advancement in the topiramate  She will call before then should she have questions or concerns or difficulties tolerating the increase  --consider moving to formal imaging should her headaches not respond appropriately to intervention

## 2018-11-01 NOTE — PATIENT INSTRUCTIONS
Increase topiramate using 50 mg tablets and taking 1 5 tablets twice daily (in the morning and in the evening)  Please keep an ongoing headache diary  Call in two weeks with a status report and to discuss possible further increase in the topiramate  Call before then should she have any questions or concerns

## 2018-11-01 NOTE — PROGRESS NOTES
Patient is here today as a new patient for her headaches    Patient ID: Quirino Balderas is a 53 y o  female  Assessment/Plan:    Intractable migraine without aura and without status migrainosus  Patient presents a history consistent with migraine without aura  Unfortunately, in the recent months, the circumstance has been one of multiple weekly episodes with between a persistence of tension-type symptoms  Fortunately, she has had some ongoing improvement with the introduction of topiramate presently at 50 mg twice daily  Tolerating with no reported adverse side effects and hopefully will have further improvement as the topiramate is advanced  --increase topiramate using 50 mg tablets to 1 5 tablets twice daily  --patient asked to maintain a headache diary  --patient made aware of the overuse of analgesics creating the environment for accentuated headache  She has been very judicious in their use and will continue to be so  --complete recent blood work with ALT and AST  --asked to call the office in two weeks with a status report and to discuss possible further advancement in the topiramate  She will call before then should she have questions or concerns or difficulties tolerating the increase  --consider moving to formal imaging should her headaches not respond appropriately to intervention  She will follow up in six weeks  Subjective:    HPI  Patient, 53years of age and right-handed, presents for further evaluation and treatment with regard to ongoing headache difficulties  She was accompanied today by her nurse navigator, Regan Flowers  Patient relates that her headaches initially started in her early to mid 35s when she experienced a flurry of headaches  These were characterized by the onset without aura of a holocranial pulsatile headache, ranging in severity from moderate to at times severe  Associated on occasion with nausea and generally associated with light and sound sensitivity    She then describes a period of time during which she had only very infrequent headaches  Unfortunately, in the past months she has experienced and increasing presence of headache to the point where she has had similar headaches occurring multiple times weekly  In between her migraines, she does experience the presence of a tension-type headache  She was started on and increased to topiramate 50 mg twice daily through the primary practitioner and has noted that her headaches are less intense and are occurring slightly less frequently  She appears to be tolerating the topiramate without any reported adverse side effects  Also carries a diagnosis of obstructive sleep apnea and states that she is Sabianist in using her CPAP machine  Recent blood work reviewed includes CBC with hemoglobin 13 2, hematocrit 41 1, white count 8 83 and platelet count 309  BMP unremarkable  TSH normal at 1 370      Does not recall having had any imaging studies in the recent past     Past Medical History:   Diagnosis Date    Anxiety     Arthritis     oa cassandra knees    Asthma     good control    Yan's esophagus     Bipolar affective disorder (HCC)     Cellulitis of abdominal wall     Chronic pain disorder     lumbar    CPAP (continuous positive airway pressure) dependence     Degenerative disc disease at L5-S1 level     Depression 09/16/2008    Disease of thyroid gland     hypo    MARTINEZ (dyspnea on exertion)     Drug overdose 10/28/2008    suicide attempt    Dyspepsia     chronic    Dysphagia     Dysphagia     Dyspnea     Edema     GERD (gastroesophageal reflux disease)     High blood pressure     Migraines     Overactive bladder     Rectal bleeding     Sjogren's disease ([address] )     Sleep apnea     Stress incontinence     Suicidal ideations     Umbilical hernia      Past Surgical History:   Procedure Laterality Date    BREAST LUMPECTOMY Right     reduction    CARPAL TUNNEL RELEASE      left    CHOLECYSTECTOMY      5/2003    COLONOSCOPY      01/12/2009    DILATION AND CURETTAGE OF UTERUS      ELBOW SURGERY Left     ESOPHAGOGASTRODUODENOSCOPY      HYSTERECTOMY      laporoscopic, partial    KNEE ARTHROSCOPY      right    LAPAROSCOPIC HYSTERECTOMY      [place] ANAL SPHINCTEROTOMY N/A 8/31/2018    Procedure: EUA, LEFT PARTIAL INTERNAL SPHINCTEROTOMY;  Surgeon: Lacho Garrison MD;  Location: [facility] MAIN OR;  Service: Colorectal    REPAIR LACERATION Left     left hand-5/18/2009    TONSILECTOMY AND ADNOIDECTOMY      TONSILLECTOMY      VEIN LIGATION Bilateral      Social History     Social History    Marital status: Single     Spouse name: N/A    Number of children: N/A    Years of education: N/A     Social History Main Topics    Smoking status: Former Smoker     Types: Cigarettes     Quit date: 1/1/2014    Smokeless tobacco: Never Used      Comment: 3 packs per day    Alcohol use No    Drug use: Yes     Types: Marijuana      Comment: drug overdose    Sexual activity: Not Asked     Other Topics Concern    None     Social History Narrative    None     Family History   Problem Relation Age of Onset    Kidney cancer Mother     Diabetes Mother     Colon cancer Family      Allergies   Allergen Reactions    No Active Allergies     No Known Allergies        Current Outpatient Prescriptions:     amLODIPine (NORVASC) 10 mg tablet, Take 1 tablet (10 mg total) by mouth daily at bedtime for 30 days, Disp: 30 tablet, Rfl: 1    buPROPion (WELLBUTRIN SR) 100 mg 12 hr tablet, Take 1 tablet (100 mg total) by mouth 2 (two) times a day for 30 days, Disp: 60 tablet, Rfl: 1    busPIRone (BUSPAR) 30 MG tablet, Take 1 tablet (30 mg total) by mouth 2 (two) times a day for 30 days, Disp: 60 tablet, Rfl: 1    chlorproMAZINE (THORAZINE) 25 mg tablet, TAKE 1 TABLET BY MOUTH AT 9 00 IN THE MORNING, Disp: , Rfl: 1    colestipol (COLESTID) 1 g tablet, Take 1 g by mouth 2 (two) times a day, Disp: , Rfl: 2    estradiol (ESTRACE) 1 mg tablet, Take 1 mg by mouth daily, Disp: , Rfl:     furosemide (LASIX) 20 mg tablet, Take 20 mg by mouth daily  , Disp: , Rfl:     levothyroxine 125 mcg tablet, Take 2 tablets (250 mcg total) by mouth daily before breakfast for 30 days, Disp: 60 tablet, Rfl: 1    losartan (COZAAR) 50 mg tablet, Take 50 mg by mouth daily  , Disp: , Rfl: 0    lurasidone (LATUDA) 120 mg tablet, Take 1 tablet (120 mg total) by mouth daily with dinner for 30 days, Disp: 30 tablet, Rfl: 0    metoprolol tartrate (LOPRESSOR) 50 mg tablet, Take 1 tablet (50 mg total) by mouth every 12 (twelve) hours, Disp: 180 tablet, Rfl: 3    omeprazole (PriLOSEC) 20 mg delayed release capsule, Take 20 mg by mouth 2 (two) times a day, Disp: , Rfl: 12    pilocarpine (SALAGEN) 5 mg tablet, Take 5 mg by mouth 3 (three) times a day, Disp: , Rfl:     potassium chloride (K-DUR,KLOR-CON) 20 mEq tablet, Take 20 mEq by mouth daily  , Disp: , Rfl: 0    tiZANidine (ZANAFLEX) 4 mg tablet, Take 1 tablet (4 mg total) by mouth 3 (three) times a day as needed for muscle spasms for up to 30 days, Disp: 45 tablet, Rfl: 3    topiramate (TOPAMAX) 50 MG tablet, By oral route take 1 5 tablets twice daily or as directed, Disp: 90 tablet, Rfl: 2    traZODone (DESYREL) 100 mg tablet, Take 2 tablets (200 mg total) by mouth daily at bedtime for 30 days, Disp: 60 tablet, Rfl: 1    chlorproMAZINE (THORAZINE) 50 mg tablet, Take 0 5 tablets (25 mg total) by mouth daily for 30 days, Disp: 15 tablet, Rfl: 1    HYDROcodone-acetaminophen (NORCO) 5-325 mg per tablet, , Disp: , Rfl:     lidocaine (LIDODERM) 5 %, Place 1 patch on the skin daily Remove & Discard patch within 12 hours or as directed by MD (Patient not taking: Reported on 11/1/2018 ), Disp: 30 patch, Rfl: 0    pantoprazole (PROTONIX) 40 mg tablet, Take 1 tablet (40 mg total) by mouth daily in the early morning for 30 days, Disp: 30 tablet, Rfl: 0    Objective:    Blood pressure 134/76, pulse 62, resp  rate 18, height 5' 5" (1 651 m), weight 135 kg (297 lb 9 6 oz)  Physical Exam  Head normocephalic  Eyes nonicteric  No audible anterior neck bruits  Lungs clear to auscultation  Rhythm regular  GI (abdomen) soft nontender with bowel sounds present  No lower extremity edema  Neurological Exam  Alert  Pleasantly interactive  Fully oriented  Appropriately conversational   No dysarthria  Unremarkable spontaneous gait  Able to tandem walk  Romberg maneuver performed unremarkably  Cranial Nerves:   I: Olfactory sense intact bilaterally  II: Visual fields full to confrontation  Pupils equal, round, reactive to light with normal accomodation  Fundus:   Marginated discs bilaterally with spontaneous venous pulsations  III,IV,VI: Extraocular muscles EOMI, no nystagmus  V: [place] and pterygoid strength  Sensation in the V1 through V3 distributions intact to pinprick and light touch bilaterally  VII:   Very modest resting facial asymmetry with symmetrical smile  VIII: Audition intact to finger rub bilaterally  IX/X: Uvula midline  Soft palate elevation symmetric  XI: Trapezius and SCM strength 5/5 bilaterally  XII: Tongue midline with no atrophy or fasciculations with appropriate movement  Accurate finger-to-nose and heel-to-shin maneuvers bilaterally  Full symmetrical strength throughout the four extremities with no upper extremity drift  Sensory testing grossly intact to pin, position and vibration throughout  Muscle stretch reflexes bilaterally one throughout the upper extremities and at the knees and bilaterally two at the ankles  Toe response is downgoing bilaterally  ROS:    Review of Systems   Constitutional: Positive for appetite change  Negative for fever  HENT: Negative  Negative for hearing loss, tinnitus, trouble swallowing and voice change  Eyes: Negative  Negative for photophobia and pain  Respiratory: Positive for shortness of breath      Cardiovascular: Negative  Negative for palpitations  Gastrointestinal: Positive for diarrhea  Negative for nausea and vomiting  Endocrine: Negative  Negative for cold intolerance and heat intolerance  Genitourinary: Positive for frequency and urgency  Negative for dysuria  Musculoskeletal: Positive for back pain (lower back pain), neck pain and neck stiffness  Negative for myalgias  Skin: Negative  Negative for rash  Allergic/Immunologic: Negative  Neurological: Positive for headaches  Negative for dizziness, tremors, seizures, syncope, facial asymmetry, speech difficulty, weakness, light-headedness and numbness  Tingling across forehead with headaches  It  comes and goes at times  Hematological: Negative  Does not bruise/bleed easily  Psychiatric/Behavioral: Positive for agitation, confusion and sleep disturbance  Negative for hallucinations  The patient is nervous/anxious  I personally reviewed the ROS that was entered by the medical assistant

## 2018-11-01 NOTE — LETTER
November 1, 2018     Candi Dillard, [address] [facility]    Patient: Morena Kumar   YOB: 1970   Date of Visit: 11/1/2018       Dear Dr Santos Has: Thank you for referring Torrie Starr to me for evaluation  Below are my notes for this consultation  If you have questions, please do not hesitate to call me  I look forward to following your patient along with you  Sincerely,        Dulce Spear MD        CC: No Recipients  Dulce Spear MD  11/1/2018  9:43 AM  Sign at close encounter  Patient is here today as a new patient for her headaches    Patient ID: Morena Kumar is a 53 y o  female  Assessment/Plan:    Intractable migraine without aura and without status migrainosus  Patient presents a history consistent with migraine without aura  Unfortunately, in the recent months, the circumstance has been one of multiple weekly episodes with between a persistence of tension-type symptoms  Fortunately, she has had some ongoing improvement with the introduction of topiramate presently at 50 mg twice daily  Tolerating with no reported adverse side effects and hopefully will have further improvement as the topiramate is advanced  --increase topiramate using 50 mg tablets to 1 5 tablets twice daily  --patient asked to maintain a headache diary  --patient made aware of the overuse of analgesics creating the environment for accentuated headache  She has been very judicious in their use and will continue to be so  --complete recent blood work with ALT and AST  --asked to call the office in two weeks with a status report and to discuss possible further advancement in the topiramate  She will call before then should she have questions or concerns or difficulties tolerating the increase  --consider moving to formal imaging should her headaches not respond appropriately to intervention  She will follow up in six weeks      Subjective:    HPI  Patient, 53years of age and right-handed, presents for further evaluation and treatment with regard to ongoing headache difficulties  She was accompanied today by her nurse navigator, Monika Wyatt  Patient relates that her headaches initially started in her early to mid 35s when she experienced a flurry of headaches  These were characterized by the onset without aura of a holocranial pulsatile headache, ranging in severity from moderate to at times severe  Associated on occasion with nausea and generally associated with light and sound sensitivity  She then describes a period of time during which she had only very infrequent headaches  Unfortunately, in the past months she has experienced and increasing presence of headache to the point where she has had similar headaches occurring multiple times weekly  In between her migraines, she does experience the presence of a tension-type headache  She was started on and increased to topiramate 50 mg twice daily through the primary practitioner and has noted that her headaches are less intense and are occurring slightly less frequently  She appears to be tolerating the topiramate without any reported adverse side effects  Also carries a diagnosis of obstructive sleep apnea and states that she is Hindu in using her CPAP machine  Recent blood work reviewed includes CBC with hemoglobin 13 2, hematocrit 41 1, white count 8 83 and platelet count 572  BMP unremarkable  TSH normal at 1 370      Does not recall having had any imaging studies in the recent past     Past Medical History:   Diagnosis Date    Anxiety     Arthritis     oa cassandra knees    Asthma     good control    Yan's esophagus     Bipolar affective disorder ([facility] [address]     Cellulitis of abdominal wall     Chronic pain disorder     lumbar    CPAP (continuous positive airway pressure) dependence     Degenerative disc disease at L5-S1 level     Depression 09/16/2008    Disease of thyroid gland     hypo    MARTINEZ (dyspnea on exertion)     Drug overdose 10/28/2008    suicide attempt    Dyspepsia     chronic    Dysphagia     Dysphagia     Dyspnea     Edema     GERD (gastroesophageal reflux disease)     High blood pressure     Migraines     Overactive bladder     Rectal bleeding     Sjogren's disease (HCC)     Sleep apnea     Stress incontinence     Suicidal ideations     Umbilical hernia      Past Surgical History:   Procedure Laterality Date    BREAST LUMPECTOMY Right     reduction    CARPAL TUNNEL RELEASE      left    CHOLECYSTECTOMY      5/2003    COLONOSCOPY      01/12/2009    DILATION AND CURETTAGE OF UTERUS      ELBOW SURGERY Left     ESOPHAGOGASTRODUODENOSCOPY      HYSTERECTOMY      laporoscopic, partial    KNEE ARTHROSCOPY      right    LAPAROSCOPIC HYSTERECTOMY      [place] ANAL SPHINCTEROTOMY N/A 8/31/2018    Procedure: EUA, LEFT PARTIAL INTERNAL SPHINCTEROTOMY;  Surgeon: Lacho Garrison MD;  Location: [facility] MAIN OR;  Service: Colorectal    REPAIR LACERATION Left     left hand-5/18/2009    TONSILECTOMY AND ADNOIDECTOMY      TONSILLECTOMY      VEIN LIGATION Bilateral      Social History     Social History    Marital status: Single     Spouse name: N/A    Number of children: N/A    Years of education: N/A     Social History Main Topics    Smoking status: Former Smoker     Types: Cigarettes     Quit date: 1/1/2014    Smokeless tobacco: Never Used      Comment: 3 packs per day    Alcohol use No    Drug use: Yes     Types: Marijuana      Comment: drug overdose    Sexual activity: Not Asked     Other Topics Concern    None     Social History Narrative    None     Family History   Problem Relation Age of Onset    Kidney cancer Mother     Diabetes Mother     Colon cancer Family      Allergies   Allergen Reactions    No Active Allergies     No Known Allergies        Current Outpatient Prescriptions:     amLODIPine (NORVASC) 10 mg tablet, Take 1 tablet (10 mg total) by mouth daily at bedtime for 30 days, Disp: 30 tablet, Rfl: 1    buPROPion (WELLBUTRIN SR) 100 mg 12 hr tablet, Take 1 tablet (100 mg total) by mouth 2 (two) times a day for 30 days, Disp: 60 tablet, Rfl: 1    busPIRone (BUSPAR) 30 MG tablet, Take 1 tablet (30 mg total) by mouth 2 (two) times a day for 30 days, Disp: 60 tablet, Rfl: 1    chlorproMAZINE (THORAZINE) 25 mg tablet, TAKE 1 TABLET BY MOUTH AT 9 00 IN THE MORNING, Disp: , Rfl: 1    colestipol (COLESTID) 1 g tablet, Take 1 g by mouth 2 (two) times a day, Disp: , Rfl: 2    estradiol (ESTRACE) 1 mg tablet, Take 1 mg by mouth daily, Disp: , Rfl:     furosemide (LASIX) 20 mg tablet, Take 20 mg by mouth daily  , Disp: , Rfl:     levothyroxine 125 mcg tablet, Take 2 tablets (250 mcg total) by mouth daily before breakfast for 30 days, Disp: 60 tablet, Rfl: 1    losartan (COZAAR) 50 mg tablet, Take 50 mg by mouth daily  , Disp: , Rfl: 0    lurasidone (LATUDA) 120 mg tablet, Take 1 tablet (120 mg total) by mouth daily with dinner for 30 days, Disp: 30 tablet, Rfl: 0    metoprolol tartrate (LOPRESSOR) 50 mg tablet, Take 1 tablet (50 mg total) by mouth every 12 (twelve) hours, Disp: 180 tablet, Rfl: 3    omeprazole (PriLOSEC) 20 mg delayed release capsule, Take 20 mg by mouth 2 (two) times a day, Disp: , Rfl: 12    pilocarpine (SALAGEN) 5 mg tablet, Take 5 mg by mouth 3 (three) times a day, Disp: , Rfl:     potassium chloride (K-DUR,KLOR-CON) 20 mEq tablet, Take 20 mEq by mouth daily  , Disp: , Rfl: 0    tiZANidine (ZANAFLEX) 4 mg tablet, Take 1 tablet (4 mg total) by mouth 3 (three) times a day as needed for muscle spasms for up to 30 days, Disp: 45 tablet, Rfl: 3    topiramate (TOPAMAX) 50 MG tablet, By oral route take 1 5 tablets twice daily or as directed, Disp: 90 tablet, Rfl: 2    traZODone (DESYREL) 100 mg tablet, Take 2 tablets (200 mg total) by mouth daily at bedtime for 30 days, Disp: 60 tablet, Rfl: 1    chlorproMAZINE (THORAZINE) 50 mg tablet, Take 0 5 tablets (25 mg total) by mouth daily for 30 days, Disp: 15 tablet, Rfl: 1    HYDROcodone-acetaminophen (NORCO) 5-325 mg per tablet, , Disp: , Rfl:     lidocaine (LIDODERM) 5 %, Place 1 patch on the skin daily Remove & Discard patch within 12 hours or as directed by MD (Patient not taking: Reported on 11/1/2018 ), Disp: 30 patch, Rfl: 0    pantoprazole (PROTONIX) 40 mg tablet, Take 1 tablet (40 mg total) by mouth daily in the early morning for 30 days, Disp: 30 tablet, Rfl: 0    Objective:    Blood pressure 134/76, pulse 62, resp  rate 18, height 5' 5" (1 651 m), weight 135 kg (297 lb 9 6 oz)  Physical Exam  Head normocephalic  Eyes nonicteric  No audible anterior neck bruits  Lungs clear to auscultation  Rhythm regular  GI (abdomen) soft nontender with bowel sounds present  No lower extremity edema  Neurological Exam  Alert  Pleasantly interactive  Fully oriented  Appropriately conversational   No dysarthria  Unremarkable spontaneous gait  Able to tandem walk  Romberg maneuver performed unremarkably  Cranial Nerves:   I: Olfactory sense intact bilaterally  II: Visual fields full to confrontation  Pupils equal, round, reactive to light with normal accomodation  Fundus:   Marginated discs bilaterally with spontaneous venous pulsations  III,IV,VI: Extraocular muscles EOMI, no nystagmus  V: [place] and pterygoid strength  Sensation in the V1 through V3 distributions intact to pinprick and light touch bilaterally  VII:   Very modest resting facial asymmetry with symmetrical smile  VIII: Audition intact to finger rub bilaterally  IX/X: Uvula midline  Soft palate elevation symmetric  XI: Trapezius and SCM strength 5/5 bilaterally  XII: Tongue midline with no atrophy or fasciculations with appropriate movement  Accurate finger-to-nose and heel-to-shin maneuvers bilaterally  Full symmetrical strength throughout the four extremities with no upper extremity drift    Sensory testing grossly intact to pin, position and vibration throughout  Muscle stretch reflexes bilaterally one throughout the upper extremities and at the knees and bilaterally two at the ankles  Toe response is downgoing bilaterally  ROS:    Review of Systems   Constitutional: Positive for appetite change  Negative for fever  HENT: Negative  Negative for hearing loss, tinnitus, trouble swallowing and voice change  Eyes: Negative  Negative for photophobia and pain  Respiratory: Positive for shortness of breath  Cardiovascular: Negative  Negative for palpitations  Gastrointestinal: Positive for diarrhea  Negative for nausea and vomiting  Endocrine: Negative  Negative for cold intolerance and heat intolerance  Genitourinary: Positive for frequency and urgency  Negative for dysuria  Musculoskeletal: Positive for back pain (lower back pain), neck pain and neck stiffness  Negative for myalgias  Skin: Negative  Negative for rash  Allergic/Immunologic: Negative  Neurological: Positive for headaches  Negative for dizziness, tremors, seizures, syncope, facial asymmetry, speech difficulty, weakness, light-headedness and numbness  Tingling across forehead with headaches  It  comes and goes at times  Hematological: Negative  Does not bruise/bleed easily  Psychiatric/Behavioral: Positive for agitation, confusion and sleep disturbance  Negative for hallucinations  The patient is nervous/anxious  I personally reviewed the ROS that was entered by the medical assistant

## 2018-11-13 LAB
ALT SERPL-CCNC: 26 U/L (ref 6–29)
AST SERPL-CCNC: 18 U/L (ref 10–35)

## 2018-11-14 DIAGNOSIS — Z86.39 HISTORY OF LOW POTASSIUM: Primary | ICD-10-CM

## 2018-11-15 ENCOUNTER — TELEPHONE (OUTPATIENT)
Dept: NEUROLOGY | Facility: CLINIC | Age: 48
End: 2018-11-15

## 2018-11-15 RX ORDER — POTASSIUM CHLORIDE 20 MEQ/1
20 TABLET, EXTENDED RELEASE ORAL DAILY
Qty: 30 TABLET | Refills: 12 | Status: SHIPPED | OUTPATIENT
Start: 2018-11-15 | End: 2019-04-08

## 2018-11-15 NOTE — TELEPHONE ENCOUNTER
Telephone call from [facility] who is the nurse navigator from [facility]  He called with an update on the patients migraine's  Patient was with him at the time of the phone call speaking in the background  Patient was informed to keep a headache diary about her migraine's  Per St carlson (Nurse Navigator) patient has had 9 migraine's since her last office visit with our office on 11/1/18  She has had some nausea but no vomiting  When she get's her migraine's the pain is between a 6-7 5 per [facility] carlson  When she takes her Topiramate and they get better they are a 5  They never completely go away  The patient told St carlson (Nurse Navigator) that she is taking Topiramate 50 mg 1 tablet 3 times a day instead of 1 5 tablet twice a day because when she tries to cut the tablet it falls apart  The Topiramate 50 mg 3 times a day seems to be helping her with the headaches

## 2018-11-27 ENCOUNTER — OFFICE VISIT (OUTPATIENT)
Dept: FAMILY MEDICINE CLINIC | Facility: CLINIC | Age: 48
End: 2018-11-27
Payer: MEDICARE

## 2018-11-27 VITALS
BODY MASS INDEX: 41.06 KG/M2 | RESPIRATION RATE: 16 BRPM | HEIGHT: 66 IN | DIASTOLIC BLOOD PRESSURE: 80 MMHG | HEART RATE: 70 BPM | TEMPERATURE: 96.4 F | SYSTOLIC BLOOD PRESSURE: 120 MMHG | WEIGHT: 255.5 LBS

## 2018-11-27 DIAGNOSIS — Z00.00 HEALTH MAINTENANCE EXAMINATION: Primary | ICD-10-CM

## 2018-11-27 DIAGNOSIS — G89.29 CHRONIC PAIN OF RIGHT KNEE: ICD-10-CM

## 2018-11-27 DIAGNOSIS — J45.909 UNCOMPLICATED ASTHMA, UNSPECIFIED ASTHMA SEVERITY, UNSPECIFIED WHETHER PERSISTENT: ICD-10-CM

## 2018-11-27 DIAGNOSIS — M25.561 CHRONIC PAIN OF RIGHT KNEE: ICD-10-CM

## 2018-11-27 PROCEDURE — 99214 OFFICE O/P EST MOD 30 MIN: CPT | Performed by: NURSE PRACTITIONER

## 2018-11-27 PROCEDURE — 90682 RIV4 VACC RECOMBINANT DNA IM: CPT | Performed by: NURSE PRACTITIONER

## 2018-11-27 PROCEDURE — 90471 IMMUNIZATION ADMIN: CPT | Performed by: NURSE PRACTITIONER

## 2018-11-27 RX ORDER — OMEPRAZOLE 40 MG/1
40 CAPSULE, DELAYED RELEASE ORAL 2 TIMES DAILY
Refills: 12 | COMMUNITY
Start: 2018-11-26 | End: 2019-04-08

## 2018-11-27 RX ORDER — PILOCARPINE HYDROCHLORIDE 5 MG/1
5 TABLET, FILM COATED ORAL
COMMUNITY
End: 2018-11-27 | Stop reason: SDUPTHER

## 2018-11-27 RX ORDER — METRONIDAZOLE 7.5 MG/G
GEL VAGINAL
Refills: 0 | COMMUNITY
Start: 2018-10-23 | End: 2018-11-27 | Stop reason: ALTCHOICE

## 2018-11-27 RX ORDER — FESOTERODINE FUMARATE 4 MG/1
4 TABLET, EXTENDED RELEASE ORAL
COMMUNITY
End: 2018-11-27 | Stop reason: ALTCHOICE

## 2018-11-27 RX ORDER — LEVOTHYROXINE SODIUM 0.05 MG/1
TABLET ORAL
Refills: 3 | COMMUNITY
Start: 2018-11-03 | End: 2018-11-27 | Stop reason: ALTCHOICE

## 2018-11-27 NOTE — PROGRESS NOTES
Subjective     Morena Kumar is a 53 y o   female and is here for routine health maintenance  The patient reports problems - Having right knee pain   Saw ortho and they said she needs a knee replaement but due to age and wt they wouldn't do yet  Starting job and having issues with standing  Will need to no a little walking and standing  Dara Soulier History of Present Illness     HPI  Cont with the right knee pain    Wants assistive devise  Advised to see if the ortho would order a brace  Psych changed med and helping with the cutting   A lot of stress with Mom , moving and friends  Well Adult Physical   Patient here for a comprehensive physical exam       Diet and Physical Activity  Diet: low calorie diet  Weight concerns: Patient has class 3 obesity (BMI >40)  Exercise: rarely      Depression Screen  PHQ-9 Depression Screening    PHQ-9:    Frequency of the following problems over the past two weeks:       Little interest or pleasure in doing things:  2 - more than half the days  Feeling down, depressed, or hopeless:  2 - more than half the days  Trouble falling or staying asleep, or sleeping too much:  2 - more than half the days  Feeling tired or having little energy:  2 - more than half the days  Poor appetite or overeatin - more than half the days  Feeling bad about yourself - or that you are a failure or have let yourself or your family down:  2 - more than half the days  Trouble concentrating on things, such as reading the newspaper or watching television:  2 - more than half the days  Moving or speaking so slowly that other people could have noticed   Or the opposite - being so fidgety or restless that you have been moving around a lot more than usual:  2 - more than half the days  Thoughts that you would be better off dead, or of hurting yourself in some way:  2 - more than half the days  PHQ-2 Score:  4  PHQ-9 Score:  18          General Health  Hearing: Normal:  bilateral  Vision: no vision problems  Dental: no dental visits for >1 year     History:  LMP: No LMP recorded  Patient has had a hysterectomy  Cancer Screening    Mammogram up to date    Pap last year  Abnormal pap? no  Smoker quit 1 year ago   Annual screening with low-dose helical computed tomography (CT) for patients age 54 to 76 years with history of smoking at least 30 pack-years and, if a former smoker, had quit within the previous 15 years      The following portions of the patient's history were reviewed and updated as appropriate: allergies, current medications, past family history, past medical history, past social history, past surgical history and problem list     Review of Systems     Review of Systems   Constitutional: Positive for activity change  Negative for appetite change, fatigue, fever and unexpected weight change  HENT: Negative for congestion, dental problem, ear pain, rhinorrhea and sneezing  Eyes: Negative for discharge and visual disturbance  Respiratory: Negative for cough and wheezing  Cardiovascular: Positive for leg swelling  Negative for chest pain  Gastrointestinal: Negative for abdominal pain, constipation, diarrhea, nausea and vomiting  Endocrine: Negative for polydipsia and polyuria  Genitourinary: Negative for dysuria, frequency and urgency  Musculoskeletal: Positive for gait problem and joint swelling  Negative for arthralgias  Skin: Negative for rash  Allergic/Immunologic: Negative for environmental allergies and food allergies  Neurological: Negative for dizziness, light-headedness, numbness and headaches  Hematological: Negative for adenopathy  Psychiatric/Behavioral: Negative for behavioral problems and sleep disturbance  The patient is nervous/anxious          Past Medical History     Past Medical History:   Diagnosis Date    Anxiety     Arthritis     oa cassandra knees    Asthma     good control    Yan's esophagus     Bipolar affective disorder ([facility] [address] )  Cellulitis of abdominal wall     Chronic pain disorder     lumbar    CPAP (continuous positive airway pressure) dependence     Degenerative disc disease at L5-S1 level     Depression 09/16/2008    Disease of thyroid gland     hypo    MARTINEZ (dyspnea on exertion)     Drug overdose 10/28/2008    suicide attempt    Dyspepsia     chronic    Dysphagia     Dysphagia     Dyspnea     Edema     GERD (gastroesophageal reflux disease)     High blood pressure     Migraines     Overactive bladder     Rectal bleeding     Sjogren's disease (HCC)     Sleep apnea     Stress incontinence     Suicidal ideations     Umbilical hernia        Past Surgical History     Past Surgical History:   Procedure Laterality Date    BREAST LUMPECTOMY Right     reduction    CARPAL TUNNEL RELEASE      left    CHOLECYSTECTOMY      5/2003    COLONOSCOPY      01/12/2009    DILATION AND CURETTAGE OF UTERUS      ELBOW SURGERY Left     ESOPHAGOGASTRODUODENOSCOPY      HYSTERECTOMY      laporoscopic, partial    KNEE ARTHROSCOPY      right    LAPAROSCOPIC HYSTERECTOMY      DC ANAL SPHINCTEROTOMY N/A 8/31/2018    Procedure: EUA, LEFT PARTIAL INTERNAL SPHINCTEROTOMY;  Surgeon: Filipe Geronimo MD;  Location: [address] [place] [address];  Service: Colorectal    REPAIR LACERATION Left     left hand-5/18/2009    TONSILECTOMY AND ADNOIDECTOMY      TONSILLECTOMY      VEIN LIGATION Bilateral        Social History     Social History     Social History    Marital status: Single     Spouse name: N/A    Number of children: N/A    Years of education: N/A     Social History Main Topics    Smoking status: Former Smoker     Types: Cigarettes     Quit date: 1/1/2014    Smokeless tobacco: Never Used      Comment: 3 packs per day    Alcohol use No    Drug use: Yes     Types: Marijuana      Comment: drug overdose    Sexual activity: Not Asked     Other Topics Concern    None     Social History Narrative    None       Family History     Family History Problem Relation Age of Onset    Kidney cancer Mother     Diabetes Mother     Colon cancer Family        Current Medications       Current Outpatient Prescriptions:     amLODIPine (NORVASC) 10 mg tablet, Take 1 tablet (10 mg total) by mouth daily at bedtime for 30 days, Disp: 30 tablet, Rfl: 1    buPROPion (WELLBUTRIN SR) 100 mg 12 hr tablet, Take 1 tablet (100 mg total) by mouth 2 (two) times a day for 30 days, Disp: 60 tablet, Rfl: 1    busPIRone (BUSPAR) 30 MG tablet, Take 1 tablet (30 mg total) by mouth 2 (two) times a day for 30 days, Disp: 60 tablet, Rfl: 1    chlorproMAZINE (THORAZINE) 25 mg tablet, TAKE 1 TABLET BY MOUTH AT 9 00 IN THE MORNING, Disp: , Rfl: 1    estradiol (ESTRACE) 1 mg tablet, Take 1 mg by mouth daily, Disp: , Rfl:     furosemide (LASIX) 20 mg tablet, Take 20 mg by mouth daily  , Disp: , Rfl:     levothyroxine 125 mcg tablet, Take 2 tablets (250 mcg total) by mouth daily before breakfast for 30 days, Disp: 60 tablet, Rfl: 1    lidocaine (LIDODERM) 5 %, Place 1 patch on the skin daily Remove & Discard patch within 12 hours or as directed by MD, Disp: 30 patch, Rfl: 0    losartan (COZAAR) 50 mg tablet, Take 50 mg by mouth daily  , Disp: , Rfl: 0    lurasidone (LATUDA) 120 mg tablet, Take 1 tablet (120 mg total) by mouth daily with dinner for 30 days, Disp: 30 tablet, Rfl: 0    metoprolol tartrate (LOPRESSOR) 50 mg tablet, Take 1 tablet (50 mg total) by mouth every 12 (twelve) hours, Disp: 180 tablet, Rfl: 3    omeprazole (PriLOSEC) 40 MG capsule, Take 40 mg by mouth 2 (two) times a day, Disp: , Rfl: 12    pilocarpine (SALAGEN) 5 mg tablet, Take 5 mg by mouth 3 (three) times a day, Disp: , Rfl:     potassium chloride (K-DUR,KLOR-CON) 20 mEq tablet, Take 1 tablet (20 mEq total) by mouth daily for 30 days, Disp: 30 tablet, Rfl: 12    tiZANidine (ZANAFLEX) 4 mg tablet, Take 1 tablet (4 mg total) by mouth 3 (three) times a day as needed for muscle spasms for up to 30 days, Disp: 45 tablet, Rfl: 3    topiramate (TOPAMAX) 50 MG tablet, By oral route take 1 5 tablets twice daily or as directed, Disp: 90 tablet, Rfl: 2    traZODone (DESYREL) 100 mg tablet, Take 2 tablets (200 mg total) by mouth daily at bedtime for 30 days (Patient taking differently: Take 100 mg by mouth 3 (three) times a day  ), Disp: 60 tablet, Rfl: 1     Allergies     Allergies   Allergen Reactions    No Active Allergies     No Known Allergies        Objective     /80 (BP Location: Left arm, Patient Position: Sitting, Cuff Size: Large)   Pulse 70   Temp (!) 96 4 °F (35 8 °C) (Temporal)   Resp 16   Ht 5' 5 75" (1 67 m)   Wt 116 kg (255 lb 8 oz)   BMI 41 56 kg/m²      Physical Exam   Constitutional: She is oriented to person, place, and time  She appears well-developed and well-nourished  HENT:   Right Ear: External ear normal    Left Ear: External ear normal    Nose: Nose normal    Mouth/Throat: Oropharynx is clear and moist    Eyes: Conjunctivae and EOM are normal    Neck: Normal range of motion  Neck supple  No thyromegaly present  Cardiovascular: Normal rate, regular rhythm and normal heart sounds  Pulmonary/Chest: Effort normal and breath sounds normal    Abdominal: Soft  Bowel sounds are normal  There is no tenderness  Musculoskeletal:        Right knee: She exhibits decreased range of motion and swelling  Tenderness found  Lymphadenopathy:     She has no cervical adenopathy  Neurological: She is alert and oriented to person, place, and time  Psychiatric: Her behavior is normal  Thought content normal    Vitals reviewed              Health Maintenance     Health Maintenance   Topic Date Due    DTaP,Tdap,and Td Vaccines (3 - Td) 07/23/2028    INFLUENZA VACCINE  Completed     Immunization History   Administered Date(s) Administered    Influenza 08/07/2014, 07/29/2015, 10/10/2018    Influenza Split 07/29/2015    Tdap 01/26/2009, 07/23/2018       Assessment/Plan     Well adult visit  Labs are current for now  Flu injection today    Right knee pain--DJD  Leonid ordered  Will contact her ortho and get another injection and possible brace since needs more support until she can get replacement  1  Healthy female exam   2  Patient Counseling:   · Nutrition: Stressed importance of a well balanced diet, moderation of sodium/saturated fat, caloric balance and sufficient intake of fiber  · Exercise: Stressed the importance of regular exercise with a goal of 150 minutes per week  · Dental Health: Discussed daily flossing and brushing and regular dental visits     · Immunizations reviewed  · Discussed benefits of screening   · Discussed the patient's BMI with her  The BMI is above average; BMI management plan is completed  3  Cancer Screening   4  Labs   5  Will give today flu injection   6  Follow up in one year      Anastacio Reardon

## 2018-11-27 NOTE — PATIENT INSTRUCTIONS
Wellness Visit for Adults   WHAT YOU NEED TO KNOW:   What is a wellness visit? A wellness visit is when you see your healthcare provider to get screened for health problems  You can also get advice on how to stay healthy  Write down your questions so you remember to ask them  Ask your healthcare provider how often you should have a wellness visit  What happens at a wellness visit? Your healthcare provider will ask about your health, and your family history of health problems  This includes high blood pressure, heart disease, and cancer  He or she will ask if you have symptoms that concern you, if you smoke, and about your mood  You may also be asked about your intake of medicines, supplements, food, and alcohol  Any of the following may be done:  · Your weight  will be checked  Your height may also be checked so your body mass index (BMI) can be calculated  Your BMI shows if you are at a healthy weight  · Your blood pressure  and heart rate will be checked  Your temperature may also be checked  · Blood and urine tests  may be done  Blood tests may be done to check your cholesterol levels  Abnormal cholesterol levels increase your risk for heart disease and stroke  You may also need a blood or urine test to check for diabetes if you are at increased risk  Urine tests may be done to look for signs of an infection or kidney disease  · A physical exam  includes checking your heartbeat and lungs with a stethoscope  Your healthcare provider may also check your skin to look for sun damage  · Screening tests  may be recommended  A screening test is done to check for diseases that may not cause symptoms  The screening tests you may need depend on your age, gender, family history, and lifestyle habits  For example, colorectal screening may be recommended if you are 48years old or older  What screening tests do I need if I am a woman? · A Pap smear  is used to screen for cervical cancer   Pap smears are usually done every 3 to 5 years depending on your age  You may need them more often if you have had abnormal Pap smear test results in the past  Ask your healthcare provider how often you should have a Pap smear  · A mammogram  is an x-ray of your breasts to screen for breast cancer  Experts recommend mammograms every 2 years starting at age 48 years  You may need a mammogram at age 52 years or younger if you have an increased risk for breast cancer  Talk to your healthcare provider about when you should start having mammograms and how often you need them  What vaccines might I need? · Get an influenza vaccine  every year  The influenza vaccine protects you from the flu  Several types of viruses cause the flu  The viruses change over time, so new vaccines are made each year  · Get a tetanus-diphtheria (Td) booster vaccine  every 10 years  This vaccine protects you against tetanus and diphtheria  Tetanus is a severe infection that may cause painful muscle spasms and lockjaw  Diphtheria is a severe bacterial infection that causes a thick covering in the back of your mouth and throat  · Get a human papillomavirus (HPV) vaccine  if you are female and aged 23 to 32 or male 23 to 24 and never received it  This vaccine protects you from HPV infection  HPV is the most common infection spread by sexual contact  HPV may also cause vaginal, penile, and anal cancers  · Get a pneumococcal vaccine  if you are aged 72 years or older  The pneumococcal vaccine is an injection given to protect you from pneumococcal disease  Pneumococcal disease is an infection caused by pneumococcal bacteria  The infection may cause pneumonia, meningitis, or an ear infection  · Get a shingles vaccine  if you are aged 61 or older, even if you have had shingles before  The shingles vaccine is an injection to protect you from the varicella-zoster virus  This is the same virus that causes chickenpox   Shingles is a painful rash that develops in people who had chickenpox or have been exposed to the virus  How can I eat healthy? My Plate is a model for planning healthy meals  It shows the types and amounts of foods that should go on your plate  Fruits and vegetables make up about half of your plate, and grains and protein make up the other half  A serving of dairy is included on the side of your plate  The amount of calories and serving sizes you need depends on your age, gender, weight, and height  Examples of healthy foods are listed below:  · Eat a variety of vegetables  such as dark green, red, and orange vegetables  You can also include canned vegetables low in sodium (salt) and frozen vegetables without added butter or sauces  · Eat a variety of fresh fruits , canned fruit in 100% juice, frozen fruit, and dried fruit  · Include whole grains  At least half of the grains you eat should be whole grains  Examples include whole-wheat bread, wheat pasta, brown rice, and whole-grain cereals such as oatmeal     · Eat a variety of protein foods such as seafood (fish and shellfish), lean meat, and poultry without skin (turkey and chicken)  Examples of lean meats include pork leg, shoulder, or tenderloin, and beef round, sirloin, tenderloin, and extra lean ground beef  Other protein foods include eggs and egg substitutes, beans, peas, soy products, nuts, and seeds  · Choose low-fat dairy products such as skim or 1% milk or low-fat yogurt, cheese, and cottage cheese  · Limit unhealthy fats  such as butter, hard margarine, and shortening  How much exercise do I need? Exercise at least 30 minutes per day on most days of the week  Some examples of exercise include walking, biking, dancing, and swimming  You can also fit in more physical activity by taking the stairs instead of the elevator or parking farther away from stores  Include muscle strengthening activities 2 days each week  Regular exercise provides many health benefits  It helps you manage your weight, and decreases your risk for type 2 diabetes, heart disease, stroke, and high blood pressure  Exercise can also help improve your mood  Ask your healthcare provider about the best exercise plan for you  What are some general health and safety guidelines I should follow? · Do not smoke  Nicotine and other chemicals in cigarettes and cigars can cause lung damage  Ask your healthcare provider for information if you currently smoke and need help to quit  E-cigarettes or smokeless tobacco still contain nicotine  Talk to your healthcare provider before you use these products  · Limit alcohol  A drink of alcohol is 12 ounces of beer, 5 ounces of wine, or 1½ ounces of liquor  · Lose weight, if needed  Being overweight increases your risk of certain health conditions  These include heart disease, high blood pressure, type 2 diabetes, and certain types of cancer  · Protect your skin  Do not sunbathe or use tanning beds  Use sunscreen with a SPF 15 or higher  Apply sunscreen at least 15 minutes before you go outside  Reapply sunscreen every 2 hours  Wear protective clothing, hats, and sunglasses when you are outside  · Drive safely  Always wear your seatbelt  Make sure everyone in your car wears a seatbelt  A seatbelt can save your life if you are in an accident  Do not use your cell phone when you are driving  This could distract you and cause an accident  Pull over if you need to make a call or send a text message  · Practice safe sex  Use latex condoms if are sexually active and have more than one partner  Your healthcare provider may recommend screening tests for sexually transmitted infections (STIs)  · Wear helmets, lifejackets, and protective gear  Always wear a helmet when you ride a bike or motorcycle, go skiing, or play sports that could cause a head injury  Wear protective equipment when you play sports   Wear a lifejacket when you are on a boat or doing water sports  CARE AGREEMENT:   You have the right to help plan your care  Learn about your health condition and how it may be treated  Discuss treatment options with your caregivers to decide what care you want to receive  You always have the right to refuse treatment  The above information is an  only  It is not intended as medical advice for individual conditions or treatments  Talk to your doctor, nurse or pharmacist before following any medical regimen to see if it is safe and effective for you  © 2017 [employer]  Information is for End User's use only and may not be sold, redistributed or otherwise used for commercial purposes  All illustrations and images included in CareNotes® are the copyrighted property of [employer]  or [employer]  Influenza Vaccine   WHAT YOU NEED TO KNOW:   What is the influenza vaccine? The influenza vaccine is an injection given to help prevent influenza (flu)  The flu is caused by a virus  The virus spreads from person to person through coughing and sneezing  Several types of viruses cause the flu  The viruses change over time, so new vaccines are made each year  The vaccine begins to protect you about 2 weeks after you get it  The flu shot usually injected into your upper arm  It may be given in your thigh  You may get a vaccine with a weak or dead virus  When should I get the influenza vaccine? The influenza vaccine is offered every year starting in September or October  Get the influenza vaccine as soon as it is available  Children 6 months to 6years old need 2 doses during the first year they get the vaccine  The 2 doses should be given at least 4 weeks apart  It is best if the same type of vaccine is given both times  The child can then receive 1 dose each year  Children 9 years or older should get 1 dose each year  Who should get the flu shot?    · Infants 6 months or older    · Any healthy adult who would like to decrease the risk for the flu    · Anyone living with or caring for children younger than 5 years     · Healthcare workers    · Anyone who lives in a long-term care facility    · Anyone who has chronic health problems, such as asthma, diabetes, or blood disorders    · Anyone who has a weak immune system    · Women who are or will be pregnant during the flu season  Who should not get the flu shot? If you have an egg allergy, ask your healthcare provider if it is safe to get the flu shot  You will need to be closely monitored by a healthcare provider while you receive the vaccine, and for an hour or more after  The following should not get the flu shot:  · Infants younger than 6 months     · Anyone who has had an allergic reaction to the flu shot    · Anyone who is sick or has a fever    · Anyone who received a diagnosis of Guillain-Barré syndrome within 6 weeks of getting a flu vaccine    · Anyone who is allergic to thimerosal (mercury)  What are the risks of the influenza vaccine? The flu shot may cause mild symptoms, such as a fever, headache, and muscle aches  It may also cause mild to moderate soreness or redness at the area where you were given the shot  You may still get the flu after you receive the influenza vaccine  If you are allergic to eggs, ask about an egg-free vaccine  You may have an allergic reaction to the vaccine  This can be life-threatening  Call 911 for any of the following:   · Your mouth and throat are swollen  · You are wheezing or have trouble breathing  · You have chest pain or your heart is beating faster than normal for you  · You feel like you are going to faint  When should I seek immediate care? · Your face is red or swollen  · You have hives that spread over your body  · You feel weak or dizzy  When should I contact my healthcare provider? · You have increased pain, redness, or swelling around the area where the shot was given      · You have questions or concerns about the influenza vaccine  CARE AGREEMENT:   You have the right to help plan your care  Learn about your health condition and how it may be treated  Discuss treatment options with your caregivers to decide what care you want to receive  You always have the right to refuse treatment  The above information is an  only  It is not intended as medical advice for individual conditions or treatments  Talk to your doctor, nurse or pharmacist before following any medical regimen to see if it is safe and effective for you  © 2017 [employer] [facility] Information is for End User's use only and may not be sold, redistributed or otherwise used for commercial purposes  All illustrations and images included in CareNotes® are the copyrighted property of [employer]  or [employer]

## 2018-11-28 NOTE — PROGRESS NOTES
Faxed to [employer] for single point cane signed by Deon Street and Dr Joy [facility] [phone]

## 2018-12-06 ENCOUNTER — TELEPHONE (OUTPATIENT)
Dept: FAMILY MEDICINE CLINIC | Facility: CLINIC | Age: 48
End: 2018-12-06

## 2018-12-10 ENCOUNTER — CLINICAL SUPPORT (OUTPATIENT)
Dept: FAMILY MEDICINE CLINIC | Facility: CLINIC | Age: 48
End: 2018-12-10
Payer: MEDICARE

## 2018-12-10 DIAGNOSIS — Z00.00 WELL ADULT EXAM: Primary | ICD-10-CM

## 2018-12-10 PROCEDURE — 86580 TB INTRADERMAL TEST: CPT | Performed by: NURSE PRACTITIONER

## 2018-12-12 ENCOUNTER — CLINICAL SUPPORT (OUTPATIENT)
Dept: FAMILY MEDICINE CLINIC | Facility: CLINIC | Age: 48
End: 2018-12-12

## 2018-12-12 DIAGNOSIS — Z11.1 SCREENING-PULMONARY TB: Primary | ICD-10-CM

## 2018-12-12 LAB
INDURATION: 0 MM
TB SKIN TEST: NEGATIVE

## 2019-02-06 DIAGNOSIS — I10 HYPERTENSION, UNSPECIFIED TYPE: ICD-10-CM

## 2019-02-06 DIAGNOSIS — E03.9 HYPOTHYROIDISM, UNSPECIFIED TYPE: ICD-10-CM

## 2019-02-06 RX ORDER — AMLODIPINE BESYLATE 10 MG/1
10 TABLET ORAL
Qty: 30 TABLET | Refills: 1 | Status: SHIPPED | OUTPATIENT
Start: 2019-02-06 | End: 2019-04-17 | Stop reason: SDUPTHER

## 2019-02-06 RX ORDER — LEVOTHYROXINE SODIUM 0.12 MG/1
250 TABLET ORAL
Qty: 60 TABLET | Refills: 1 | Status: SHIPPED | OUTPATIENT
Start: 2019-02-06 | End: 2019-02-13 | Stop reason: SDUPTHER

## 2019-02-06 NOTE — TELEPHONE ENCOUNTER
Patient called she is returning your call she said that she takes 2 tablets of 125 mg of her thyroid medication TY

## 2019-02-08 RX ORDER — LURASIDONE HYDROCHLORIDE 120 MG/1
120 TABLET, FILM COATED ORAL
Refills: 0 | COMMUNITY
Start: 2018-12-28 | End: 2019-04-08

## 2019-02-08 RX ORDER — BUPROPION HYDROCHLORIDE 100 MG/1
TABLET ORAL
Refills: 1 | COMMUNITY
Start: 2019-01-28 | End: 2019-02-11 | Stop reason: SDUPTHER

## 2019-02-08 RX ORDER — PANTOPRAZOLE SODIUM 40 MG/1
40 TABLET, DELAYED RELEASE ORAL EVERY MORNING
Refills: 5 | COMMUNITY
Start: 2019-01-14 | End: 2019-05-30 | Stop reason: HOSPADM

## 2019-02-08 RX ORDER — LURASIDONE HYDROCHLORIDE 80 MG/1
160 TABLET, FILM COATED ORAL
Refills: 0 | COMMUNITY
Start: 2019-01-23 | End: 2019-04-08

## 2019-02-08 RX ORDER — TOPIRAMATE 100 MG/1
100 TABLET, FILM COATED ORAL 2 TIMES DAILY
Refills: 3 | Status: ON HOLD | COMMUNITY
Start: 2019-01-23 | End: 2019-05-30 | Stop reason: SDUPTHER

## 2019-02-08 RX ORDER — TIZANIDINE 4 MG/1
TABLET ORAL
COMMUNITY
Start: 2018-01-16 | End: 2019-02-11 | Stop reason: SDUPTHER

## 2019-02-08 RX ORDER — CHLORPROMAZINE HYDROCHLORIDE 50 MG/1
TABLET, FILM COATED ORAL
Refills: 3 | COMMUNITY
Start: 2019-01-23 | End: 2019-05-30 | Stop reason: HOSPADM

## 2019-02-08 RX ORDER — POTASSIUM CHLORIDE 20 MEQ/1
TABLET, EXTENDED RELEASE ORAL
Refills: 12 | COMMUNITY
Start: 2019-01-24 | End: 2019-05-30 | Stop reason: HOSPADM

## 2019-02-08 RX ORDER — TRAZODONE HYDROCHLORIDE 100 MG/1
100 TABLET ORAL
Refills: 0 | COMMUNITY
Start: 2018-11-27 | End: 2019-04-08

## 2019-02-08 RX ORDER — BUPROPION HYDROCHLORIDE 100 MG/1
100 TABLET, EXTENDED RELEASE ORAL 2 TIMES DAILY
Refills: 0 | COMMUNITY
Start: 2018-12-28 | End: 2019-04-08

## 2019-02-08 RX ORDER — LEVOTHYROXINE SODIUM 0.05 MG/1
TABLET ORAL
Refills: 3 | COMMUNITY
Start: 2019-01-28 | End: 2019-02-14

## 2019-02-08 RX ORDER — MONTELUKAST SODIUM 4 MG/1
1 TABLET, CHEWABLE ORAL 2 TIMES DAILY
Refills: 2 | COMMUNITY
Start: 2019-01-02 | End: 2019-05-30 | Stop reason: HOSPADM

## 2019-02-11 ENCOUNTER — OFFICE VISIT (OUTPATIENT)
Dept: SURGERY | Facility: CLINIC | Age: 49
End: 2019-02-11
Payer: MEDICARE

## 2019-02-11 VITALS
TEMPERATURE: 97.7 F | WEIGHT: 271 LBS | SYSTOLIC BLOOD PRESSURE: 140 MMHG | RESPIRATION RATE: 16 BRPM | HEART RATE: 76 BPM | HEIGHT: 66 IN | BODY MASS INDEX: 43.55 KG/M2 | DIASTOLIC BLOOD PRESSURE: 78 MMHG

## 2019-02-11 DIAGNOSIS — K42.9 UMBILICAL HERNIA WITHOUT OBSTRUCTION AND WITHOUT GANGRENE: Primary | ICD-10-CM

## 2019-02-11 DIAGNOSIS — E66.01 MORBID OBESITY (HCC): ICD-10-CM

## 2019-02-11 PROCEDURE — 99213 OFFICE O/P EST LOW 20 MIN: CPT | Performed by: FAMILY MEDICINE

## 2019-02-11 RX ORDER — TRAZODONE HYDROCHLORIDE 100 MG/1
100 TABLET ORAL
COMMUNITY
End: 2019-02-11 | Stop reason: SDUPTHER

## 2019-02-11 RX ORDER — FUROSEMIDE 20 MG/1
1 TABLET ORAL EVERY 24 HOURS
COMMUNITY
Start: 2017-02-03 | End: 2019-04-08 | Stop reason: SDUPTHER

## 2019-02-11 RX ORDER — OMEPRAZOLE 40 MG/1
1 CAPSULE, DELAYED RELEASE ORAL EVERY 12 HOURS
COMMUNITY
End: 2019-02-11 | Stop reason: SDUPTHER

## 2019-02-11 RX ORDER — FESOTERODINE FUMARATE 4 MG/1
1 TABLET, EXTENDED RELEASE ORAL EVERY 24 HOURS
COMMUNITY
End: 2019-05-30 | Stop reason: HOSPADM

## 2019-02-11 RX ORDER — BUSPIRONE HYDROCHLORIDE 15 MG/1
1 TABLET ORAL EVERY 12 HOURS
COMMUNITY
Start: 2011-06-27 | End: 2019-04-08 | Stop reason: SDUPTHER

## 2019-02-11 RX ORDER — MONTELUKAST SODIUM 4 MG/1
1 TABLET, CHEWABLE ORAL
COMMUNITY
Start: 2018-07-16 | End: 2019-04-08 | Stop reason: SDUPTHER

## 2019-02-11 RX ORDER — POTASSIUM CHLORIDE 20 MEQ/1
1 TABLET, EXTENDED RELEASE ORAL EVERY 24 HOURS
COMMUNITY
Start: 2018-06-14 | End: 2019-02-11 | Stop reason: SDUPTHER

## 2019-02-11 RX ORDER — METOPROLOL TARTRATE 50 MG/1
1 TABLET, FILM COATED ORAL EVERY 12 HOURS
COMMUNITY
Start: 2018-05-18 | End: 2019-02-11 | Stop reason: SDUPTHER

## 2019-02-11 RX ORDER — POTASSIUM CHLORIDE 1.5 G/1.77G
20 POWDER, FOR SOLUTION ORAL
COMMUNITY
End: 2019-02-11 | Stop reason: SDUPTHER

## 2019-02-11 RX ORDER — METOPROLOL TARTRATE 50 MG/1
50 TABLET, FILM COATED ORAL
COMMUNITY
Start: 2018-05-22 | End: 2019-02-11 | Stop reason: SDUPTHER

## 2019-02-11 RX ORDER — LEVOTHYROXINE SODIUM 200 UG/1
250 CAPSULE ORAL
COMMUNITY
End: 2019-02-14

## 2019-02-11 RX ORDER — AMLODIPINE BESYLATE 10 MG/1
1 TABLET ORAL EVERY 24 HOURS
COMMUNITY
Start: 2018-03-09 | End: 2019-02-11 | Stop reason: SDUPTHER

## 2019-02-11 RX ORDER — TOPIRAMATE 50 MG/1
50 TABLET, FILM COATED ORAL
COMMUNITY
End: 2019-02-11 | Stop reason: SDUPTHER

## 2019-02-11 RX ORDER — TIZANIDINE HYDROCHLORIDE 4 MG/1
4 CAPSULE, GELATIN COATED ORAL
COMMUNITY
End: 2019-04-08

## 2019-02-11 NOTE — PROGRESS NOTES
Assessment/Plan:   Matteo Moses is a 53 y  o female who is here for umbilical hernia without obstruction and without gangrene  Hernia has been present since at least July 2018  She was seen in August 2018  CT 8/13/2018 showed 3 2 x 3 4 cm umbilical hernia containing fat  Minimal internal stranding  Plan at that time was to try and lose weight before repair to prevent risk of recurrence  Since then she has lost 26 lbs  Also started new job  Plan:  -Follow-up in office in June for reevaluation and scheduling    -Plan for surgical repair towards the end of summer    -Continue with weight management  Preoperative Clearance: None    - Patient has been instructed to avoid herbs or non-directed vitamins the week prior to surgery to ensure no drug interactions with perioperative surgical and anesthetic medications  - Patient should continue beta-blocker medication up through and including the day of surgery but hold any other hypertensive medications, including diuretics, unless instructed by PCP or anesthesia  - Patient should continue his statin medication up through and including the day of surgery   - Hold metformin , If on this medication, the morning of surgery and do not resume until 48 hours AFTER surgery to avoid risk of lactic acidosis  Do not resume if eGFR is < 30  - Insulin Management:If on Insulin, patient advised to call PCP for explicit instructions  In general, will need to take one-half normal dose am of surgery but pt advised to consult PCP before making any changes  - Patient has been instructed to avoid aspirin containing medications or non-steroidal anti-inflammatory drugs for SEVEN days preceding surgery  _____________________________________________      HPI:  Matteo Moses is a 53 y  o female who is here for Follow-up (umbilical hernia)    Currently complaining of Umbilical Hernia  Symptoms of mild discomfort are persisting   Denies nausea and vomiting       regular bowel movement  Duration of pain or symptoms:  no pain       Prior abdominal surgery:   Yes      Lab Results   Component Value Date    WBC 8 83 09/13/2018    HGB 13 2 09/13/2018    HCT 41 1 09/13/2018    MCV 89 09/13/2018     09/13/2018     Lab Results   Component Value Date    K 4 5 09/06/2018     09/06/2018    CO2 31 09/06/2018    BUN 7 09/06/2018    CALCIUM 9 5 09/06/2018    AST 18 11/12/2018    ALT 26 11/12/2018     No results found for: INR, PROTIME        ROS:  General ROS: negative  negative for - chills, fatigue, fever or night sweats, weight loss  Respiratory ROS: no cough, shortness of breath, or wheezing  Cardiovascular ROS: no chest pain or dyspnea on exertion  Genito-Urinary ROS: no dysuria, trouble voiding, or hematuria  Musculoskeletal ROS: negative for - gait disturbance, joint pain or muscle pain  Neurological ROS: no TIA or stroke symptoms  Abdominal ROS: see HPI  GI ROS: see HPI  Skin ROS: no new rashes or lesions   Lymphatic ROS: no new adenopathy noted by pt     GYN ROS: see HPI, no new GYN history or bleeding noted  Psy ROS: no new mental or behavioral disturbances       Patient Active Problem List   Diagnosis    Asthma    Yan's esophagus determined by biopsy    Benign essential hypertension    Schizoaffective disorder, bipolar type ([address] )    Chronic low back pain    DDD (degenerative disc disease), lumbar    Dysphagia    Edema    Family history of renal cancer    Headache    Hypothyroidism    Microhematuria    Morbid obesity ([address] )    MAG (obstructive sleep apnea)    Precordial pain    Spondylosis of lumbosacral joint without myelopathy    Stress incontinence in female    Abdominal pain    Hypertension    Overactive bladder    Primary osteoarthritis of both knees    Urinary incontinence    Intractable migraine without aura and without status migrainosus    Chronic tension-type headache, not intractable    Umbilical hernia         Allergies: No active allergies and No known allergies    Meds:  Current Outpatient Medications:     amLODIPine (NORVASC) 10 mg tablet, 1 tablet every 24 hours, Disp: , Rfl:     busPIRone (BUSPAR) 15 mg tablet, 1 tablet Every 12 hours, Disp: , Rfl:     colestipol (COLESTID) 1 g tablet, Take 1 g by mouth, Disp: , Rfl:     furosemide (LASIX) 20 mg tablet, 1 tablet every 24 hours, Disp: , Rfl:     tiZANidine (ZANAFLEX) 4 mg tablet, take 1 tablet by oral route  every 8 hours as needed not to exceed 3 doses in 24 hours, Disp: , Rfl:     amLODIPine (NORVASC) 10 mg tablet, Take 1 tablet (10 mg total) by mouth daily at bedtime for 30 days, Disp: 30 tablet, Rfl: 1    buPROPion (WELLBUTRIN SR) 100 mg 12 hr tablet, Take 1 tablet (100 mg total) by mouth 2 (two) times a day for 30 days, Disp: 60 tablet, Rfl: 1    buPROPion (WELLBUTRIN SR) 100 mg 12 hr tablet, Take 100 mg by mouth 2 (two) times a day, Disp: , Rfl: 0    buPROPion (WELLBUTRIN) 100 mg tablet, TAKE 1 TABLET BY MOUTH EVERY MORNING& 1 TABLET BY MOUTH AT 5 PM, Disp: , Rfl: 1    busPIRone (BUSPAR) 30 MG tablet, Take 1 tablet (30 mg total) by mouth 2 (two) times a day for 30 days, Disp: 60 tablet, Rfl: 1    chlorproMAZINE (THORAZINE) 25 mg tablet, TAKE 1 TABLET BY MOUTH AT 9 00 IN THE MORNING, Disp: , Rfl: 1    chlorproMAZINE (THORAZINE) 50 mg tablet, TAKE 1 TABLET BY MOUTH TWICE DAILY AS NEEDED FOR AGITATION, Disp: , Rfl: 3    colestipol (COLESTID) 1 g tablet, Take 1 g by mouth 2 (two) times a day, Disp: , Rfl: 2    estradiol (ESTRACE) 1 mg tablet, Take 1 mg by mouth daily, Disp: , Rfl:     Fesoterodine Fumarate ER (TOVIAZ) 4 MG TB24, 1 tablet every 24 hours, Disp: , Rfl:     furosemide (LASIX) 20 mg tablet, Take 20 mg by mouth daily  , Disp: , Rfl:     LATUDA 120 MG tablet, Take 120 mg by mouth daily at bedtime, Disp: , Rfl: 0    LATUDA 80 MG tablet, Take 160 mg by mouth daily at bedtime, Disp: , Rfl: 0    levothyroxine 125 mcg tablet, Take 2 tablets (250 mcg total) by mouth daily before breakfast for 30 days, Disp: 60 tablet, Rfl: 1    levothyroxine 50 mcg tablet, TAKE 1 TABLET (50 MCG TOTAL) BY MOUTH DAILY, Disp: , Rfl: 3    lidocaine (LIDODERM) 5 %, Place 1 patch on the skin daily Remove & Discard patch within 12 hours or as directed by MD, Disp: 30 patch, Rfl: 0    losartan (COZAAR) 50 mg tablet, Take 50 mg by mouth daily  , Disp: , Rfl: 0    lurasidone (LATUDA) 120 mg tablet, Take 1 tablet (120 mg total) by mouth daily with dinner for 30 days, Disp: 30 tablet, Rfl: 0    metoprolol tartrate (LOPRESSOR) 50 mg tablet, Take 1 tablet (50 mg total) by mouth every 12 (twelve) hours, Disp: 180 tablet, Rfl: 3    omeprazole (PriLOSEC) 40 MG capsule, Take 40 mg by mouth 2 (two) times a day, Disp: , Rfl: 12    omeprazole (PriLOSEC) 40 MG capsule, 1 capsule Every 12 hours, Disp: , Rfl:     pantoprazole (PROTONIX) 40 mg tablet, Take 40 mg by mouth daily, Disp: , Rfl: 5    pilocarpine (SALAGEN) 5 mg tablet, Take 5 mg by mouth 3 (three) times a day, Disp: , Rfl:     potassium chloride (K-DUR,KLOR-CON) 20 mEq tablet, Take 1 tablet (20 mEq total) by mouth daily for 30 days, Disp: 30 tablet, Rfl: 12    potassium chloride (K-DUR,KLOR-CON) 20 mEq tablet, TAKE 1 TABLET (20 MEQ TOTAL) BY MOUTH DAILY FOR 30 DAYS, Disp: , Rfl: 12    potassium chloride (KLOR-CON) 20 mEq packet, Take 20 mEq by mouth, Disp: , Rfl:     TiZANidine (ZANAFLEX) 4 MG capsule, Take 4 mg by mouth, Disp: , Rfl:     tiZANidine (ZANAFLEX) 4 mg tablet, Take 1 tablet (4 mg total) by mouth 3 (three) times a day as needed for muscle spasms for up to 30 days, Disp: 45 tablet, Rfl: 3    topiramate (TOPAMAX) 100 mg tablet, Take 100 mg by mouth 2 (two) times a day, Disp: , Rfl: 3    topiramate (TOPAMAX) 50 MG tablet, By oral route take 1 5 tablets twice daily or as directed, Disp: 90 tablet, Rfl: 2    topiramate (TOPAMAX) 50 MG tablet, Take 50 mg by mouth, Disp: , Rfl:     traZODone (DESYREL) 100 mg tablet, Take 2 tablets (200 mg total) by mouth daily at bedtime for 30 days (Patient taking differently: Take 100 mg by mouth 3 (three) times a day  ), Disp: 60 tablet, Rfl: 1    traZODone (DESYREL) 100 mg tablet, Take 100 mg by mouth daily at bedtime, Disp: , Rfl: 0    traZODone (DESYREL) 100 mg tablet, Take 100 mg by mouth, Disp: , Rfl:     PMH:  Past Medical History:   Diagnosis Date    Anxiety     Arthritis     oa cassandra knees    Asthma     good control    Yan's esophagus     Bipolar affective disorder (HCC)     Cellulitis of abdominal wall     Chronic pain disorder     lumbar    CPAP (continuous positive airway pressure) dependence     Degenerative disc disease at L5-S1 level     Depression 09/16/2008    Disease of thyroid gland     hypo    MARTINEZ (dyspnea on exertion)     Drug overdose 10/28/2008    suicide attempt    Dyspepsia     chronic    Dysphagia     Dysphagia     Dyspnea     Edema     GERD (gastroesophageal reflux disease)     High blood pressure     Migraines     Overactive bladder     Rectal bleeding     Sjogren's disease ([address] )     Sleep apnea     Stress incontinence     Suicidal ideations     Umbilical hernia        PSH:  Past Surgical History:   Procedure Laterality Date    BREAST LUMPECTOMY Right     reduction    CARPAL TUNNEL RELEASE      left    CHOLECYSTECTOMY      5/2003    COLONOSCOPY      01/12/2009    DILATION AND CURETTAGE OF UTERUS      ELBOW SURGERY Left     ESOPHAGOGASTRODUODENOSCOPY      HYSTERECTOMY      laporoscopic, partial    KNEE ARTHROSCOPY      right    LAPAROSCOPIC HYSTERECTOMY      [place] ANAL SPHINCTEROTOMY N/A 8/31/2018    Procedure: EUA, LEFT PARTIAL INTERNAL SPHINCTEROTOMY;  Surgeon: Pattie Bonner MD;  Location: [address] [place] [address];  Service: Colorectal    REPAIR LACERATION Left     left hand-5/18/2009    TONSILECTOMY AND ADNOIDECTOMY      TONSILLECTOMY      VEIN LIGATION Bilateral        Family History   Problem Relation Age of Onset    Kidney cancer Mother  Diabetes Mother     Colon cancer Family         reports that she quit smoking about 5 years ago  Her smoking use included cigarettes  She has never used smokeless tobacco  She reports that she has current or past drug history  Drug: Marijuana  She reports that she does not drink alcohol  PHYSICAL EXAM  General Appearance:    Alert, cooperative, no distress, Restin comfortably on the examining table  Head:    Normocephalic without obvious abnormality   Eyes:    PERRL, conjunctiva/corneas clear, EOM's intact        Neck:   Supple, no adenopathy, no JVD   Back:     Symmetric, no spinal or CVA tenderness   Lungs:     Clear to auscultation bilaterally, no wheezing or rhonchi   Heart:    Regular rate and rhythm, S1 and S2 normal, no murmur   Abdomen:      abdomen is soft without significant tenderness, masses, organomegaly or guarding Bowel sounds are normal     umbilical hernia  The hernia is, easily reducible, mildly tender  The fascial edges are easily palpable and the defect measures 2 cm in size  Extremities:   Extremities normal  No clubbing, cyanosis or edema   Psych:   Normal Affect, AOx3  Neurologic:  Skin:   CNII-XII intact  Strength symmetric, speech intact    Warm, dry, intact, no visible rashes or lesions                   Informed consent for procedure was personally discussed, reviewed, and signed by Dr Eboni Morley  Discussion by Dr Eboni Morley was carried out regarding risks, benefits, and alternatives with the patient  Risks include but are not limited to:  bleeding, infection, and delayed wound healing or an open wound, pulmonary embolus, leaks from bowel or bile ducts or other viscus, transfusions, death  Discussed in further detail the more common complications and their rates of occurrence   was used if necessary  Patient expressed understanding of the issues discussed and wished/consented to proceed  All questions were answered by Dr Eboni Morley      Discussion performed between patient and the provider signing below  Signature:   Jayda Carreon MD    Date: 2/11/2019 Time: 1:47 PM       Some portions of this record may have been generated with voice recognition software  There may be translation, syntax,  or grammatical errors  Occasional wrong word or "sound-a-like" substitutions may have occurred due to the inherent limitations of the voice recognition software  Read the chart carefully and recognize, using context, where substitutions may have occurred  If you have any questions, please contact the dictating provider for clarification or correction, as needed  This encounter has been coded by a non-certified coder

## 2019-02-13 DIAGNOSIS — E03.9 HYPOTHYROIDISM, UNSPECIFIED TYPE: ICD-10-CM

## 2019-02-13 RX ORDER — IBUPROFEN 800 MG/1
1 TABLET ORAL 3 TIMES DAILY
COMMUNITY
Start: 2015-04-29 | End: 2019-04-08

## 2019-02-13 RX ORDER — QUETIAPINE FUMARATE 300 MG/1
600 TABLET, FILM COATED ORAL
COMMUNITY
Start: 2018-05-26 | End: 2019-04-08

## 2019-02-13 RX ORDER — BUSPIRONE HYDROCHLORIDE 15 MG/1
15 TABLET ORAL
COMMUNITY
Start: 2018-05-26 | End: 2019-04-08

## 2019-02-13 RX ORDER — ZOLPIDEM TARTRATE 10 MG/1
10 TABLET ORAL
COMMUNITY
Start: 2018-05-26 | End: 2019-04-08

## 2019-02-13 RX ORDER — OMEPRAZOLE 40 MG/1
40 CAPSULE, DELAYED RELEASE ORAL
COMMUNITY
End: 2019-04-08

## 2019-02-13 RX ORDER — LEVOTHYROXINE SODIUM 0.2 MG/1
1 TABLET ORAL EVERY 24 HOURS
COMMUNITY
Start: 2017-10-11 | End: 2019-04-08

## 2019-02-13 RX ORDER — QUETIAPINE FUMARATE 100 MG/1
100 TABLET, FILM COATED ORAL
COMMUNITY
Start: 2018-07-11 | End: 2019-04-08

## 2019-02-13 RX ORDER — BUSPIRONE HYDROCHLORIDE 30 MG/1
15 TABLET ORAL
COMMUNITY
Start: 2018-07-11 | End: 2019-04-08

## 2019-02-13 RX ORDER — LEVOTHYROXINE SODIUM 0.12 MG/1
250 TABLET ORAL
Qty: 60 TABLET | Refills: 1 | Status: SHIPPED | OUTPATIENT
Start: 2019-02-13 | End: 2019-02-14 | Stop reason: SDUPTHER

## 2019-02-13 RX ORDER — BUPROPION HYDROCHLORIDE 150 MG/1
150 TABLET, EXTENDED RELEASE ORAL
COMMUNITY
End: 2019-04-08

## 2019-02-13 RX ORDER — SERTRALINE HYDROCHLORIDE 100 MG/1
1 TABLET, FILM COATED ORAL EVERY 24 HOURS
COMMUNITY
Start: 2018-01-25 | End: 2019-04-08

## 2019-02-13 RX ORDER — CHLORPROMAZINE HYDROCHLORIDE 25 MG/1
25 TABLET, FILM COATED ORAL
COMMUNITY
End: 2019-04-08

## 2019-02-13 RX ORDER — CHLORPROMAZINE HYDROCHLORIDE 50 MG/1
50 TABLET, FILM COATED ORAL
COMMUNITY
End: 2019-04-08 | Stop reason: SDUPTHER

## 2019-02-13 RX ORDER — LOSARTAN POTASSIUM 50 MG/1
50 TABLET ORAL EVERY MORNING
COMMUNITY
Start: 2018-10-11 | End: 2019-05-30 | Stop reason: HOSPADM

## 2019-02-13 RX ORDER — GABAPENTIN 300 MG/1
600 CAPSULE ORAL
COMMUNITY
Start: 2018-05-26 | End: 2019-04-08

## 2019-02-13 NOTE — TELEPHONE ENCOUNTER
Pt states that she received a 90 day supply for 50mg of Levothyroxine with the ordering date of 01/28/19??? Pt states that this in incorrect  Med list was checked and another MA ordered this  Bea did order the correct dosage amount on 02/06/19 it states it was printed  Please advise thank you

## 2019-02-13 NOTE — TELEPHONE ENCOUNTER
[facility] called to say she is on levothyroxine 250mg per day   They delivered 50mg to her which is NOT correct   [facility]

## 2019-02-13 NOTE — TELEPHONE ENCOUNTER
Called pt to verify dose of levothyroxine  Apparently we got a refill for another dose and she is on a different dose     Once clarified let me know correct dose and will erx to pharmacy

## 2019-02-14 ENCOUNTER — TELEPHONE (OUTPATIENT)
Dept: OTHER | Facility: OTHER | Age: 49
End: 2019-02-14

## 2019-02-14 ENCOUNTER — TELEPHONE (OUTPATIENT)
Dept: FAMILY MEDICINE CLINIC | Facility: CLINIC | Age: 49
End: 2019-02-14

## 2019-02-14 DIAGNOSIS — E03.9 HYPOTHYROIDISM, UNSPECIFIED TYPE: ICD-10-CM

## 2019-02-14 RX ORDER — LEVOTHYROXINE SODIUM 0.12 MG/1
250 TABLET ORAL
Qty: 60 TABLET | Refills: 1 | Status: SHIPPED | OUTPATIENT
Start: 2019-02-14 | End: 2019-04-30 | Stop reason: SDUPTHER

## 2019-02-14 NOTE — TELEPHONE ENCOUNTER
Talked with her and straightened out her synthroid   Total amount taking is 250 daily   Will call in 125 2 tabs daily

## 2019-02-14 NOTE — TELEPHONE ENCOUNTER
Pt called in today  checking the status on her med refill  Pt states that she is on levothyroxine 250mg per day  They delivered 50mg to her which is NOT correct  LYNN Fitzpatrick   Pt would like this addressed today

## 2019-02-15 NOTE — TELEPHONE ENCOUNTER
Spoke to pt and advised and also I called the pharmacy to give a verbal since it looked like the rx did not go thru

## 2019-03-20 ENCOUNTER — TELEPHONE (OUTPATIENT)
Dept: FAMILY MEDICINE CLINIC | Facility: CLINIC | Age: 49
End: 2019-03-20

## 2019-03-20 NOTE — TELEPHONE ENCOUNTER
She was taking this for dry mouth? Was she dx with the sjogren's ? This is a med I usually do not use  How was she taking it?

## 2019-03-22 ENCOUNTER — TELEPHONE (OUTPATIENT)
Dept: FAMILY MEDICINE CLINIC | Facility: CLINIC | Age: 49
End: 2019-03-22

## 2019-03-22 DIAGNOSIS — M35.00 SJOGREN'S SYNDROME, WITH UNSPECIFIED ORGAN INVOLVEMENT (HCC): Primary | ICD-10-CM

## 2019-03-22 RX ORDER — PILOCARPINE HYDROCHLORIDE 5 MG/1
5 TABLET, FILM COATED ORAL 3 TIMES DAILY
Qty: 90 TABLET | Refills: 1 | Status: SHIPPED | OUTPATIENT
Start: 2019-03-22 | End: 2019-04-08 | Stop reason: SDUPTHER

## 2019-03-22 NOTE — TELEPHONE ENCOUNTER
Patient returning calls about Rx for Pilocarpine 5 mg she said that she does have Sjogrens and that she was taking the Pilocarpine in the morning afternoon and at bed time if jose approves she would like it called into [facility] at  [address]

## 2019-04-08 ENCOUNTER — OFFICE VISIT (OUTPATIENT)
Dept: SURGERY | Facility: CLINIC | Age: 49
End: 2019-04-08
Payer: MEDICARE

## 2019-04-08 VITALS
DIASTOLIC BLOOD PRESSURE: 80 MMHG | SYSTOLIC BLOOD PRESSURE: 146 MMHG | RESPIRATION RATE: 16 BRPM | HEIGHT: 66 IN | BODY MASS INDEX: 42.27 KG/M2 | WEIGHT: 263 LBS | HEART RATE: 73 BPM | TEMPERATURE: 97.2 F

## 2019-04-08 DIAGNOSIS — K42.9 UMBILICAL HERNIA WITHOUT OBSTRUCTION AND WITHOUT GANGRENE: Primary | ICD-10-CM

## 2019-04-08 PROCEDURE — 99213 OFFICE O/P EST LOW 20 MIN: CPT | Performed by: SURGERY

## 2019-04-08 RX ORDER — CHLORPROMAZINE HYDROCHLORIDE 50 MG/1
50 TABLET, FILM COATED ORAL AS NEEDED
COMMUNITY
End: 2019-04-19

## 2019-04-08 RX ORDER — FUROSEMIDE 20 MG/1
20 TABLET ORAL EVERY MORNING
COMMUNITY
End: 2019-05-30 | Stop reason: HOSPADM

## 2019-04-08 RX ORDER — BUSPIRONE HYDROCHLORIDE 15 MG/1
15 TABLET ORAL 2 TIMES DAILY
COMMUNITY
End: 2019-05-30 | Stop reason: HOSPADM

## 2019-04-08 RX ORDER — FESOTERODINE FUMARATE 4 MG/1
TABLET, EXTENDED RELEASE ORAL
COMMUNITY
End: 2019-04-19

## 2019-04-08 RX ORDER — BUPROPION HYDROCHLORIDE 150 MG/1
150 TABLET ORAL EVERY MORNING
COMMUNITY
End: 2019-05-30 | Stop reason: HOSPADM

## 2019-04-08 RX ORDER — LEVOTHYROXINE SODIUM 0.12 MG/1
125 TABLET ORAL EVERY MORNING
COMMUNITY
End: 2019-04-19

## 2019-04-08 RX ORDER — AMLODIPINE BESYLATE 10 MG/1
10 TABLET ORAL
COMMUNITY
End: 2019-04-19

## 2019-04-10 PROBLEM — K42.9 UMBILICAL HERNIA WITHOUT OBSTRUCTION AND WITHOUT GANGRENE: Status: ACTIVE | Noted: 2019-04-10

## 2019-04-17 DIAGNOSIS — I10 HYPERTENSION, UNSPECIFIED TYPE: ICD-10-CM

## 2019-04-17 RX ORDER — AMLODIPINE BESYLATE 10 MG/1
10 TABLET ORAL
Qty: 30 TABLET | Refills: 1 | Status: SHIPPED | OUTPATIENT
Start: 2019-04-17 | End: 2019-05-30 | Stop reason: HOSPADM

## 2019-04-18 ENCOUNTER — ANESTHESIA EVENT (OUTPATIENT)
Dept: PERIOP | Facility: HOSPITAL | Age: 49
End: 2019-04-18
Payer: MEDICARE

## 2019-04-18 RX ORDER — SODIUM CHLORIDE 9 MG/ML
125 INJECTION, SOLUTION INTRAVENOUS CONTINUOUS
Status: CANCELLED | OUTPATIENT
Start: 2019-04-24

## 2019-04-19 ENCOUNTER — APPOINTMENT (OUTPATIENT)
Dept: PREADMISSION TESTING | Facility: HOSPITAL | Age: 49
End: 2019-04-19
Payer: MEDICARE

## 2019-04-19 RX ORDER — IBUPROFEN 400 MG/1
TABLET ORAL EVERY 6 HOURS PRN
COMMUNITY
End: 2019-05-03

## 2019-04-19 RX ORDER — PILOCARPINE HYDROCHLORIDE 5 MG/1
5 TABLET, FILM COATED ORAL 2 TIMES DAILY
COMMUNITY
End: 2019-05-30 | Stop reason: HOSPADM

## 2019-04-24 ENCOUNTER — ANESTHESIA (OUTPATIENT)
Dept: PERIOP | Facility: HOSPITAL | Age: 49
End: 2019-04-24
Payer: MEDICARE

## 2019-04-24 ENCOUNTER — HOSPITAL ENCOUNTER (OUTPATIENT)
Facility: HOSPITAL | Age: 49
Setting detail: OUTPATIENT SURGERY
Discharge: HOME | End: 2019-04-24
Attending: SURGERY | Admitting: SURGERY
Payer: MEDICARE

## 2019-04-24 VITALS
HEIGHT: 67 IN | HEART RATE: 68 BPM | DIASTOLIC BLOOD PRESSURE: 83 MMHG | TEMPERATURE: 98.3 F | OXYGEN SATURATION: 95 % | RESPIRATION RATE: 16 BRPM | WEIGHT: 247.13 LBS | BODY MASS INDEX: 38.79 KG/M2 | SYSTOLIC BLOOD PRESSURE: 161 MMHG

## 2019-04-24 DIAGNOSIS — K42.9 UMBILICAL HERNIA WITHOUT OBSTRUCTION AND WITHOUT GANGRENE: Primary | ICD-10-CM

## 2019-04-24 PROCEDURE — C1781 MESH (IMPLANTABLE): HCPCS | Performed by: SURGERY

## 2019-04-24 PROCEDURE — 49653 PR LAP, VENTRAL HERNIA REPAIR,INCARCERATED: CPT | Performed by: PHYSICIAN ASSISTANT

## 2019-04-24 PROCEDURE — NC001 PR NO CHARGE: Performed by: SURGERY

## 2019-04-24 PROCEDURE — 49653 PR LAP, VENTRAL HERNIA REPAIR,INCARCERATED: CPT | Performed by: SURGERY

## 2019-04-24 DEVICE — VENTRALEX ST HERNIA PATCH
Type: IMPLANTABLE DEVICE | Site: ABDOMEN | Status: FUNCTIONAL
Brand: VENTRALEX ST HERNIA PATCH

## 2019-04-24 RX ORDER — SODIUM CHLORIDE 9 MG/ML
125 INJECTION, SOLUTION INTRAVENOUS CONTINUOUS
Status: DISCONTINUED | OUTPATIENT
Start: 2019-04-24 | End: 2019-04-24 | Stop reason: HOSPADM

## 2019-04-24 RX ORDER — GLYCOPYRROLATE 0.2 MG/ML
INJECTION INTRAMUSCULAR; INTRAVENOUS AS NEEDED
Status: DISCONTINUED | OUTPATIENT
Start: 2019-04-24 | End: 2019-04-24 | Stop reason: SURG

## 2019-04-24 RX ORDER — HYDROCODONE BITARTRATE AND ACETAMINOPHEN 5; 325 MG/1; MG/1
1 TABLET ORAL EVERY 4 HOURS PRN
Qty: 10 TABLET | Refills: 0 | Status: SHIPPED | OUTPATIENT
Start: 2019-04-24 | End: 2019-05-30 | Stop reason: HOSPADM

## 2019-04-24 RX ORDER — MIDAZOLAM HYDROCHLORIDE 1 MG/ML
INJECTION INTRAMUSCULAR; INTRAVENOUS AS NEEDED
Status: DISCONTINUED | OUTPATIENT
Start: 2019-04-24 | End: 2019-04-24 | Stop reason: SURG

## 2019-04-24 RX ORDER — ONDANSETRON 2 MG/ML
INJECTION INTRAMUSCULAR; INTRAVENOUS AS NEEDED
Status: DISCONTINUED | OUTPATIENT
Start: 2019-04-24 | End: 2019-04-24 | Stop reason: SURG

## 2019-04-24 RX ORDER — ONDANSETRON 4 MG/1
4 TABLET, FILM COATED ORAL EVERY 8 HOURS PRN
Qty: 20 TABLET | Refills: 0 | Status: SHIPPED | OUTPATIENT
Start: 2019-04-24 | End: 2019-04-27 | Stop reason: SDUPTHER

## 2019-04-24 RX ORDER — HYDROCODONE BITARTRATE AND ACETAMINOPHEN 5; 325 MG/1; MG/1
1 TABLET ORAL EVERY 4 HOURS PRN
Status: DISCONTINUED | OUTPATIENT
Start: 2019-04-24 | End: 2019-04-24 | Stop reason: HOSPADM

## 2019-04-24 RX ORDER — DOCUSATE SODIUM 100 MG/1
100 CAPSULE, LIQUID FILLED ORAL 2 TIMES DAILY
Qty: 60 CAPSULE | Refills: 0 | Status: SHIPPED | OUTPATIENT
Start: 2019-04-24 | End: 2019-05-04 | Stop reason: SDUPTHER

## 2019-04-24 RX ORDER — NEOSTIGMINE METHYLSULFATE 1 MG/ML
INJECTION INTRAVENOUS AS NEEDED
Status: DISCONTINUED | OUTPATIENT
Start: 2019-04-24 | End: 2019-04-24 | Stop reason: SURG

## 2019-04-24 RX ORDER — ONDANSETRON 2 MG/ML
4 INJECTION INTRAMUSCULAR; INTRAVENOUS EVERY 4 HOURS PRN
Status: DISCONTINUED | OUTPATIENT
Start: 2019-04-24 | End: 2019-04-24 | Stop reason: HOSPADM

## 2019-04-24 RX ORDER — MORPHINE SULFATE 10 MG/ML
2 INJECTION, SOLUTION INTRAMUSCULAR; INTRAVENOUS EVERY 2 HOUR PRN
Status: DISCONTINUED | OUTPATIENT
Start: 2019-04-24 | End: 2019-04-24 | Stop reason: HOSPADM

## 2019-04-24 RX ORDER — ALBUTEROL SULFATE 2.5 MG/3ML
2.5 SOLUTION RESPIRATORY (INHALATION) ONCE AS NEEDED
Status: DISCONTINUED | OUTPATIENT
Start: 2019-04-24 | End: 2019-04-24 | Stop reason: HOSPADM

## 2019-04-24 RX ORDER — ROCURONIUM BROMIDE 10 MG/ML
INJECTION, SOLUTION INTRAVENOUS AS NEEDED
Status: DISCONTINUED | OUTPATIENT
Start: 2019-04-24 | End: 2019-04-24 | Stop reason: SURG

## 2019-04-24 RX ORDER — CEFAZOLIN SODIUM 1 G/50ML
SOLUTION INTRAVENOUS AS NEEDED
Status: DISCONTINUED | OUTPATIENT
Start: 2019-04-24 | End: 2019-04-24 | Stop reason: SURG

## 2019-04-24 RX ORDER — DEXAMETHASONE SODIUM PHOSPHATE 4 MG/ML
INJECTION, SOLUTION INTRA-ARTICULAR; INTRALESIONAL; INTRAMUSCULAR; INTRAVENOUS; SOFT TISSUE AS NEEDED
Status: DISCONTINUED | OUTPATIENT
Start: 2019-04-24 | End: 2019-04-24 | Stop reason: SURG

## 2019-04-24 RX ORDER — ONDANSETRON 2 MG/ML
4 INJECTION INTRAMUSCULAR; INTRAVENOUS EVERY 8 HOURS PRN
Status: DISCONTINUED | OUTPATIENT
Start: 2019-04-24 | End: 2019-04-24 | Stop reason: HOSPADM

## 2019-04-24 RX ORDER — HYDROMORPHONE HCL/PF 1 MG/ML
1 SYRINGE (ML) INJECTION
Status: DISCONTINUED | OUTPATIENT
Start: 2019-04-24 | End: 2019-04-24 | Stop reason: HOSPADM

## 2019-04-24 RX ORDER — HYDROMORPHONE HCL/PF 1 MG/ML
0.5 SYRINGE (ML) INJECTION
Status: DISCONTINUED | OUTPATIENT
Start: 2019-04-24 | End: 2019-04-24 | Stop reason: HOSPADM

## 2019-04-24 RX ORDER — PROPOFOL 10 MG/ML
INJECTION, EMULSION INTRAVENOUS AS NEEDED
Status: DISCONTINUED | OUTPATIENT
Start: 2019-04-24 | End: 2019-04-24 | Stop reason: SURG

## 2019-04-24 RX ORDER — CEFAZOLIN SODIUM 2 G/50ML
2000 SOLUTION INTRAVENOUS ONCE
Status: COMPLETED | OUTPATIENT
Start: 2019-04-24 | End: 2019-04-24

## 2019-04-24 RX ORDER — DEXTROSE AND SODIUM CHLORIDE 5; .45 G/100ML; G/100ML
80 INJECTION, SOLUTION INTRAVENOUS CONTINUOUS
Status: DISCONTINUED | OUTPATIENT
Start: 2019-04-24 | End: 2019-04-24 | Stop reason: HOSPADM

## 2019-04-24 RX ORDER — ONDANSETRON 4 MG/1
4 TABLET, ORALLY DISINTEGRATING ORAL EVERY 8 HOURS PRN
Status: DISCONTINUED | OUTPATIENT
Start: 2019-04-24 | End: 2019-04-24 | Stop reason: HOSPADM

## 2019-04-24 RX ORDER — MAGNESIUM HYDROXIDE 1200 MG/15ML
LIQUID ORAL AS NEEDED
Status: DISCONTINUED | OUTPATIENT
Start: 2019-04-24 | End: 2019-04-24 | Stop reason: HOSPADM

## 2019-04-24 RX ORDER — FENTANYL CITRATE 50 UG/ML
INJECTION, SOLUTION INTRAMUSCULAR; INTRAVENOUS AS NEEDED
Status: DISCONTINUED | OUTPATIENT
Start: 2019-04-24 | End: 2019-04-24 | Stop reason: SURG

## 2019-04-24 RX ORDER — MEPERIDINE HYDROCHLORIDE 50 MG/ML
12.5 INJECTION INTRAMUSCULAR; INTRAVENOUS; SUBCUTANEOUS AS NEEDED
Status: DISCONTINUED | OUTPATIENT
Start: 2019-04-24 | End: 2019-04-24 | Stop reason: HOSPADM

## 2019-04-24 RX ORDER — FENTANYL CITRATE/PF 50 MCG/ML
50 SYRINGE (ML) INJECTION
Status: DISCONTINUED | OUTPATIENT
Start: 2019-04-24 | End: 2019-04-24 | Stop reason: HOSPADM

## 2019-04-24 RX ORDER — ACETAMINOPHEN 325 MG/1
650 TABLET ORAL EVERY 6 HOURS PRN
Status: DISCONTINUED | OUTPATIENT
Start: 2019-04-24 | End: 2019-04-24 | Stop reason: HOSPADM

## 2019-04-24 RX ADMIN — PROPOFOL 200 MG: 10 INJECTION, EMULSION INTRAVENOUS at 16:09

## 2019-04-24 RX ADMIN — ROCURONIUM BROMIDE 10 MG: 10 INJECTION, SOLUTION INTRAVENOUS at 16:41

## 2019-04-24 RX ADMIN — ROCURONIUM BROMIDE 10 MG: 10 INJECTION, SOLUTION INTRAVENOUS at 17:29

## 2019-04-24 RX ADMIN — FENTANYL CITRATE 50 MCG: 50 INJECTION, SOLUTION INTRAMUSCULAR; INTRAVENOUS at 18:20

## 2019-04-24 RX ADMIN — ROCURONIUM BROMIDE 10 MG: 10 INJECTION, SOLUTION INTRAVENOUS at 17:06

## 2019-04-24 RX ADMIN — HYDROMORPHONE HYDROCHLORIDE 0.5 MG: 1 INJECTION, SOLUTION INTRAMUSCULAR; INTRAVENOUS; SUBCUTANEOUS at 18:30

## 2019-04-24 RX ADMIN — CEFAZOLIN SODIUM 2000 MG: 2 SOLUTION INTRAVENOUS at 16:07

## 2019-04-24 RX ADMIN — ROCURONIUM BROMIDE 50 MG: 10 INJECTION, SOLUTION INTRAVENOUS at 16:09

## 2019-04-24 RX ADMIN — FENTANYL CITRATE 50 MCG: 50 INJECTION, SOLUTION INTRAMUSCULAR; INTRAVENOUS at 16:47

## 2019-04-24 RX ADMIN — MIDAZOLAM 2 MG: 1 INJECTION INTRAMUSCULAR; INTRAVENOUS at 15:58

## 2019-04-24 RX ADMIN — DEXAMETHASONE SODIUM PHOSPHATE 4 MG: 4 INJECTION, SOLUTION INTRAMUSCULAR; INTRAVENOUS at 16:09

## 2019-04-24 RX ADMIN — FENTANYL CITRATE 100 MCG: 50 INJECTION, SOLUTION INTRAMUSCULAR; INTRAVENOUS at 16:09

## 2019-04-24 RX ADMIN — LIDOCAINE HYDROCHLORIDE 100 MG: 20 INJECTION, SOLUTION INTRAVENOUS at 16:09

## 2019-04-24 RX ADMIN — FENTANYL CITRATE 50 MCG: 50 INJECTION, SOLUTION INTRAMUSCULAR; INTRAVENOUS at 17:49

## 2019-04-24 RX ADMIN — NEOSTIGMINE METHYLSULFATE 5 MG: 1 INJECTION, SOLUTION INTRAVENOUS at 17:56

## 2019-04-24 RX ADMIN — ONDANSETRON HYDROCHLORIDE 4 MG: 2 INJECTION, SOLUTION INTRAVENOUS at 17:50

## 2019-04-24 RX ADMIN — SODIUM CHLORIDE: 0.9 INJECTION, SOLUTION INTRAVENOUS at 16:43

## 2019-04-24 RX ADMIN — GLYCOPYRROLATE 0.6 MG: 0.2 INJECTION INTRAMUSCULAR; INTRAVENOUS at 17:56

## 2019-04-24 RX ADMIN — FENTANYL CITRATE 50 MCG: 50 INJECTION, SOLUTION INTRAMUSCULAR; INTRAVENOUS at 18:13

## 2019-04-24 RX ADMIN — SODIUM CHLORIDE 125 ML/HR: 0.9 INJECTION, SOLUTION INTRAVENOUS at 18:14

## 2019-04-24 RX ADMIN — MIDAZOLAM 2 MG: 1 INJECTION INTRAMUSCULAR; INTRAVENOUS at 15:38

## 2019-04-24 RX ADMIN — CEFAZOLIN SODIUM 1000 MG: 1 SOLUTION INTRAVENOUS at 16:16

## 2019-04-24 RX ADMIN — SODIUM CHLORIDE 125 ML/HR: 0.9 INJECTION, SOLUTION INTRAVENOUS at 11:38

## 2019-04-25 ENCOUNTER — TELEPHONE (OUTPATIENT)
Dept: SURGERY | Facility: CLINIC | Age: 49
End: 2019-04-25

## 2019-04-26 ENCOUNTER — TELEPHONE (OUTPATIENT)
Dept: SURGERY | Facility: CLINIC | Age: 49
End: 2019-04-26

## 2019-04-27 DIAGNOSIS — K42.9 UMBILICAL HERNIA WITHOUT OBSTRUCTION AND WITHOUT GANGRENE: ICD-10-CM

## 2019-04-29 RX ORDER — ONDANSETRON 4 MG/1
4 TABLET, FILM COATED ORAL EVERY 8 HOURS PRN
Qty: 20 TABLET | Refills: 0 | Status: SHIPPED | OUTPATIENT
Start: 2019-04-29 | End: 2019-05-06

## 2019-04-30 DIAGNOSIS — E03.9 HYPOTHYROIDISM, UNSPECIFIED TYPE: ICD-10-CM

## 2019-04-30 RX ORDER — LEVOTHYROXINE SODIUM 0.12 MG/1
250 TABLET ORAL
Qty: 60 TABLET | Refills: 2 | Status: SHIPPED | OUTPATIENT
Start: 2019-04-30 | End: 2019-05-30 | Stop reason: HOSPADM

## 2019-05-03 ENCOUNTER — OFFICE VISIT (OUTPATIENT)
Dept: SURGERY | Facility: CLINIC | Age: 49
End: 2019-05-03

## 2019-05-03 VITALS
WEIGHT: 252 LBS | TEMPERATURE: 98.5 F | DIASTOLIC BLOOD PRESSURE: 80 MMHG | HEART RATE: 66 BPM | RESPIRATION RATE: 16 BRPM | SYSTOLIC BLOOD PRESSURE: 158 MMHG | BODY MASS INDEX: 39.55 KG/M2 | HEIGHT: 67 IN

## 2019-05-03 DIAGNOSIS — K42.9 UMBILICAL HERNIA WITHOUT OBSTRUCTION AND WITHOUT GANGRENE: Primary | ICD-10-CM

## 2019-05-03 DIAGNOSIS — L23.9 ALLERGIC CONTACT DERMATITIS, UNSPECIFIED TRIGGER: ICD-10-CM

## 2019-05-03 PROCEDURE — 99024 POSTOP FOLLOW-UP VISIT: CPT | Performed by: PHYSICIAN ASSISTANT

## 2019-05-03 RX ORDER — ONDANSETRON 4 MG/1
TABLET, ORALLY DISINTEGRATING ORAL
Refills: 0 | COMMUNITY
Start: 2019-04-29 | End: 2019-05-22

## 2019-05-04 DIAGNOSIS — K42.9 UMBILICAL HERNIA WITHOUT OBSTRUCTION AND WITHOUT GANGRENE: ICD-10-CM

## 2019-05-06 ENCOUNTER — OFFICE VISIT (OUTPATIENT)
Dept: SURGERY | Facility: CLINIC | Age: 49
End: 2019-05-06

## 2019-05-06 VITALS
HEIGHT: 67 IN | SYSTOLIC BLOOD PRESSURE: 160 MMHG | HEART RATE: 65 BPM | WEIGHT: 256 LBS | DIASTOLIC BLOOD PRESSURE: 84 MMHG | BODY MASS INDEX: 40.18 KG/M2 | RESPIRATION RATE: 16 BRPM | TEMPERATURE: 98.2 F

## 2019-05-06 DIAGNOSIS — K43.9 VENTRAL HERNIA WITHOUT OBSTRUCTION OR GANGRENE: Primary | ICD-10-CM

## 2019-05-06 PROCEDURE — 99024 POSTOP FOLLOW-UP VISIT: CPT | Performed by: SURGERY

## 2019-05-06 RX ORDER — PRAMIPEXOLE DIHYDROCHLORIDE 0.25 MG/1
TABLET ORAL
COMMUNITY
Start: 2017-12-05 | End: 2019-05-22

## 2019-05-06 RX ORDER — DOCUSATE SODIUM 100 MG/1
100 CAPSULE, LIQUID FILLED ORAL 2 TIMES DAILY
Qty: 60 CAPSULE | Refills: 0 | Status: ON HOLD | OUTPATIENT
Start: 2019-05-06 | End: 2019-05-30 | Stop reason: SDUPTHER

## 2019-05-14 ENCOUNTER — TELEPHONE (OUTPATIENT)
Dept: OTHER | Facility: OTHER | Age: 49
End: 2019-05-14

## 2019-05-22 ENCOUNTER — HOSPITAL ENCOUNTER (INPATIENT)
Facility: HOSPITAL | Age: 49
LOS: 7 days | Discharge: HOME | DRG: 885 | End: 2019-05-30
Attending: EMERGENCY MEDICINE | Admitting: STUDENT IN AN ORGANIZED HEALTH CARE EDUCATION/TRAINING PROGRAM
Payer: MEDICARE

## 2019-05-22 DIAGNOSIS — K42.9 UMBILICAL HERNIA WITHOUT OBSTRUCTION AND WITHOUT GANGRENE: ICD-10-CM

## 2019-05-22 DIAGNOSIS — F32.9 MAJOR DEPRESSION: ICD-10-CM

## 2019-05-22 DIAGNOSIS — E03.9 ACQUIRED HYPOTHYROIDISM: ICD-10-CM

## 2019-05-22 DIAGNOSIS — I10 HYPERTENSION, UNSPECIFIED TYPE: ICD-10-CM

## 2019-05-22 DIAGNOSIS — T14.91XA SUICIDAL BEHAVIOR WITH ATTEMPTED SELF-INJURY (HCC): Primary | ICD-10-CM

## 2019-05-22 DIAGNOSIS — K22.70 BARRETT'S ESOPHAGUS DETERMINED BY BIOPSY: ICD-10-CM

## 2019-05-22 DIAGNOSIS — N39.3 STRESS INCONTINENCE IN FEMALE: ICD-10-CM

## 2019-05-22 DIAGNOSIS — F25.0 SCHIZOAFFECTIVE DISORDER, BIPOLAR TYPE (HCC): Chronic | ICD-10-CM

## 2019-05-22 DIAGNOSIS — M35.00 SJOGREN'S SYNDROME (HCC): ICD-10-CM

## 2019-05-22 DIAGNOSIS — G47.00 INSOMNIA, UNSPECIFIED TYPE: ICD-10-CM

## 2019-05-22 DIAGNOSIS — R45.851 SUICIDAL INTENT: ICD-10-CM

## 2019-05-22 DIAGNOSIS — E78.00 HYPERCHOLESTEREMIA: ICD-10-CM

## 2019-05-22 LAB
ALBUMIN SERPL BCP-MCNC: 2.4 G/DL (ref 3–5.2)
ALP SERPL-CCNC: 59 U/L (ref 43–122)
ALT SERPL W P-5'-P-CCNC: 23 U/L (ref 9–52)
AMPHETAMINES SERPL QL SCN: POSITIVE
ANION GAP SERPL CALCULATED.3IONS-SCNC: 5 MMOL/L (ref 5–14)
APAP SERPL-MCNC: 17 UG/ML (ref 10–20)
APAP SERPL-MCNC: 21 UG/ML (ref 10–20)
APAP SERPL-MCNC: 24 UG/ML (ref 10–20)
APTT PPP: 29 SECONDS (ref 23–34)
AST SERPL W P-5'-P-CCNC: 13 U/L (ref 14–36)
BARBITURATES UR QL: NEGATIVE
BASOPHILS # BLD AUTO: 0 THOUSANDS/ΜL (ref 0–0.1)
BASOPHILS NFR BLD AUTO: 0 % (ref 0–1)
BENZODIAZ UR QL: NEGATIVE
BILIRUB SERPL-MCNC: 0.3 MG/DL
BUN SERPL-MCNC: 8 MG/DL (ref 5–25)
CALCIUM SERPL-MCNC: 6.7 MG/DL (ref 8.4–10.2)
CHLORIDE SERPL-SCNC: 118 MMOL/L (ref 97–108)
CO2 SERPL-SCNC: 19 MMOL/L (ref 22–30)
COCAINE UR QL: NEGATIVE
CREAT SERPL-MCNC: 0.4 MG/DL (ref 0.6–1.2)
EOSINOPHIL # BLD AUTO: 0.1 THOUSAND/ΜL (ref 0–0.4)
EOSINOPHIL NFR BLD AUTO: 1 % (ref 0–6)
ERYTHROCYTE [DISTWIDTH] IN BLOOD BY AUTOMATED COUNT: 13.4 %
ETHANOL EXG-MCNC: 0 MG/DL
GFR SERPL CREATININE-BSD FRML MDRD: 124 ML/MIN/1.73SQ M
GLUCOSE SERPL-MCNC: 77 MG/DL (ref 70–99)
HCT VFR BLD AUTO: 41.6 % (ref 36–46)
HGB BLD-MCNC: 13.6 G/DL (ref 12–16)
INR PPP: 1 (ref 0.89–1.1)
LYMPHOCYTES # BLD AUTO: 2.1 THOUSANDS/ΜL (ref 0.5–4)
LYMPHOCYTES NFR BLD AUTO: 19 % (ref 25–45)
MCH RBC QN AUTO: 29.8 PG (ref 26–34)
MCHC RBC AUTO-ENTMCNC: 32.8 G/DL (ref 31–36)
MCV RBC AUTO: 91 FL (ref 80–100)
METHADONE UR QL: NEGATIVE
MONOCYTES # BLD AUTO: 1 THOUSAND/ΜL (ref 0.2–0.9)
MONOCYTES NFR BLD AUTO: 9 % (ref 1–10)
NEUTROPHILS # BLD AUTO: 8 THOUSANDS/ΜL (ref 1.8–7.8)
NEUTS SEG NFR BLD AUTO: 72 % (ref 45–65)
OPIATES UR QL SCN: POSITIVE
PCP UR QL: NEGATIVE
PLATELET # BLD AUTO: 167 THOUSANDS/UL (ref 150–450)
PMV BLD AUTO: 9.3 FL (ref 8.9–12.7)
POTASSIUM SERPL-SCNC: 2.8 MMOL/L (ref 3.6–5)
PROT SERPL-MCNC: 4.7 G/DL (ref 5.9–8.4)
PROTHROMBIN TIME: 10.6 SECONDS (ref 9.5–11.6)
RBC # BLD AUTO: 4.57 MILLION/UL (ref 4–5.2)
SALICYLATES SERPL-MCNC: <1 MG/DL (ref 10–30)
SODIUM SERPL-SCNC: 142 MMOL/L (ref 137–147)
THC UR QL: POSITIVE
TSH SERPL DL<=0.05 MIU/L-ACNC: <0.015 UIU/ML (ref 0.47–4.68)
WBC # BLD AUTO: 11.2 THOUSAND/UL (ref 4.5–11)

## 2019-05-22 PROCEDURE — 82075 ASSAY OF BREATH ETHANOL: CPT | Performed by: EMERGENCY MEDICINE

## 2019-05-22 PROCEDURE — 99285 EMERGENCY DEPT VISIT HI MDM: CPT

## 2019-05-22 PROCEDURE — 99285 EMERGENCY DEPT VISIT HI MDM: CPT | Performed by: EMERGENCY MEDICINE

## 2019-05-22 PROCEDURE — 80307 DRUG TEST PRSMV CHEM ANLYZR: CPT | Performed by: EMERGENCY MEDICINE

## 2019-05-22 PROCEDURE — 85730 THROMBOPLASTIN TIME PARTIAL: CPT | Performed by: EMERGENCY MEDICINE

## 2019-05-22 PROCEDURE — 85025 COMPLETE CBC W/AUTO DIFF WBC: CPT | Performed by: EMERGENCY MEDICINE

## 2019-05-22 PROCEDURE — 93005 ELECTROCARDIOGRAM TRACING: CPT

## 2019-05-22 PROCEDURE — 85610 PROTHROMBIN TIME: CPT | Performed by: EMERGENCY MEDICINE

## 2019-05-22 PROCEDURE — 80329 ANALGESICS NON-OPIOID 1 OR 2: CPT | Performed by: EMERGENCY MEDICINE

## 2019-05-22 PROCEDURE — 80053 COMPREHEN METABOLIC PANEL: CPT | Performed by: EMERGENCY MEDICINE

## 2019-05-22 PROCEDURE — 96361 HYDRATE IV INFUSION ADD-ON: CPT

## 2019-05-22 PROCEDURE — 84443 ASSAY THYROID STIM HORMONE: CPT | Performed by: EMERGENCY MEDICINE

## 2019-05-22 PROCEDURE — 96372 THER/PROPH/DIAG INJ SC/IM: CPT

## 2019-05-22 PROCEDURE — 36415 COLL VENOUS BLD VENIPUNCTURE: CPT | Performed by: EMERGENCY MEDICINE

## 2019-05-22 PROCEDURE — 96360 HYDRATION IV INFUSION INIT: CPT

## 2019-05-22 RX ORDER — TRAZODONE HYDROCHLORIDE 100 MG/1
200 TABLET ORAL
Status: DISCONTINUED | OUTPATIENT
Start: 2019-05-22 | End: 2019-05-30 | Stop reason: HOSPADM

## 2019-05-22 RX ORDER — SODIUM CHLORIDE 9 MG/ML
125 INJECTION, SOLUTION INTRAVENOUS CONTINUOUS
Status: DISCONTINUED | OUTPATIENT
Start: 2019-05-22 | End: 2019-05-23

## 2019-05-22 RX ORDER — METOPROLOL TARTRATE 50 MG/1
50 TABLET, FILM COATED ORAL 2 TIMES DAILY
Status: DISCONTINUED | OUTPATIENT
Start: 2019-05-22 | End: 2019-05-23

## 2019-05-22 RX ORDER — POTASSIUM CHLORIDE 750 MG/1
40 TABLET, EXTENDED RELEASE ORAL ONCE
Status: COMPLETED | OUTPATIENT
Start: 2019-05-22 | End: 2019-05-22

## 2019-05-22 RX ORDER — TIZANIDINE 4 MG/1
4 TABLET ORAL EVERY 8 HOURS PRN
Status: DISCONTINUED | OUTPATIENT
Start: 2019-05-22 | End: 2019-05-30 | Stop reason: HOSPADM

## 2019-05-22 RX ORDER — LOSARTAN POTASSIUM 50 MG/1
50 TABLET ORAL DAILY
Status: DISCONTINUED | OUTPATIENT
Start: 2019-05-23 | End: 2019-05-30 | Stop reason: HOSPADM

## 2019-05-22 RX ORDER — AMLODIPINE BESYLATE 5 MG/1
10 TABLET ORAL
Status: DISCONTINUED | OUTPATIENT
Start: 2019-05-22 | End: 2019-05-29

## 2019-05-22 RX ORDER — PILOCARPINE HYDROCHLORIDE 5 MG/1
5 TABLET, FILM COATED ORAL 3 TIMES DAILY
Status: DISCONTINUED | OUTPATIENT
Start: 2019-05-22 | End: 2019-05-30 | Stop reason: HOSPADM

## 2019-05-22 RX ORDER — FUROSEMIDE 20 MG/1
20 TABLET ORAL DAILY
Status: DISCONTINUED | OUTPATIENT
Start: 2019-05-23 | End: 2019-05-30 | Stop reason: HOSPADM

## 2019-05-22 RX ORDER — BUPROPION HYDROCHLORIDE 150 MG/1
150 TABLET ORAL DAILY
Status: DISCONTINUED | OUTPATIENT
Start: 2019-05-22 | End: 2019-05-30 | Stop reason: HOSPADM

## 2019-05-22 RX ORDER — BUSPIRONE HYDROCHLORIDE 15 MG/1
15 TABLET ORAL 2 TIMES DAILY
Status: DISCONTINUED | OUTPATIENT
Start: 2019-05-22 | End: 2019-05-25

## 2019-05-22 RX ORDER — CHLORPROMAZINE HYDROCHLORIDE 25 MG/ML
25 INJECTION INTRAMUSCULAR ONCE
Status: COMPLETED | OUTPATIENT
Start: 2019-05-22 | End: 2019-05-22

## 2019-05-22 RX ORDER — POTASSIUM CHLORIDE 750 MG/1
20 TABLET, EXTENDED RELEASE ORAL DAILY
Status: DISCONTINUED | OUTPATIENT
Start: 2019-05-23 | End: 2019-05-30 | Stop reason: HOSPADM

## 2019-05-22 RX ORDER — PANTOPRAZOLE SODIUM 40 MG/1
40 TABLET, DELAYED RELEASE ORAL DAILY
Status: DISCONTINUED | OUTPATIENT
Start: 2019-05-23 | End: 2019-05-30 | Stop reason: HOSPADM

## 2019-05-22 RX ORDER — TOPIRAMATE 100 MG/1
100 TABLET, FILM COATED ORAL 2 TIMES DAILY
Status: DISCONTINUED | OUTPATIENT
Start: 2019-05-22 | End: 2019-05-30

## 2019-05-22 RX ADMIN — BUSPIRONE HYDROCHLORIDE 15 MG: 15 TABLET ORAL at 21:14

## 2019-05-22 RX ADMIN — TOPIRAMATE 100 MG: 100 TABLET, FILM COATED ORAL at 21:10

## 2019-05-22 RX ADMIN — PILOCARPINE HYDROCHLORIDE 5 MG: 5 TABLET, FILM COATED ORAL at 21:15

## 2019-05-22 RX ADMIN — POTASSIUM CHLORIDE 40 MEQ: 10 TABLET, EXTENDED RELEASE ORAL at 14:07

## 2019-05-22 RX ADMIN — LURASIDONE HYDROCHLORIDE 160 MG: 80 TABLET, FILM COATED ORAL at 21:10

## 2019-05-22 RX ADMIN — CHLORPROMAZINE HYDROCHLORIDE 25 MG: 25 INJECTION INTRAMUSCULAR at 20:52

## 2019-05-22 RX ADMIN — METOPROLOL TARTRATE 50 MG: 50 TABLET, FILM COATED ORAL at 20:50

## 2019-05-22 RX ADMIN — TRAZODONE HYDROCHLORIDE 200 MG: 100 TABLET ORAL at 20:50

## 2019-05-22 RX ADMIN — SODIUM CHLORIDE 125 ML/HR: 9 INJECTION, SOLUTION INTRAVENOUS at 12:51

## 2019-05-22 RX ADMIN — AMLODIPINE BESYLATE 10 MG: 5 TABLET ORAL at 20:50

## 2019-05-23 PROBLEM — M35.00 SJOGREN'S SYNDROME (HCC): Status: ACTIVE | Noted: 2019-05-23

## 2019-05-23 PROBLEM — F12.10 MARIJUANA ABUSE: Status: ACTIVE | Noted: 2019-05-23

## 2019-05-23 PROBLEM — F32.9 MAJOR DEPRESSIVE DISORDER: Status: ACTIVE | Noted: 2019-05-23

## 2019-05-23 LAB
ALBUMIN SERPL BCP-MCNC: 3.6 G/DL (ref 3–5.2)
ALP SERPL-CCNC: 81 U/L (ref 43–122)
ALT SERPL W P-5'-P-CCNC: 29 U/L (ref 9–52)
ANION GAP SERPL CALCULATED.3IONS-SCNC: 7 MMOL/L (ref 5–14)
AST SERPL W P-5'-P-CCNC: 23 U/L (ref 14–36)
BASOPHILS # BLD AUTO: 0 THOUSANDS/ΜL (ref 0–0.1)
BASOPHILS NFR BLD AUTO: 0 % (ref 0–1)
BILIRUB SERPL-MCNC: 0.5 MG/DL
BUN SERPL-MCNC: 10 MG/DL (ref 5–25)
CALCIUM SERPL-MCNC: 9.5 MG/DL (ref 8.4–10.2)
CHLORIDE SERPL-SCNC: 107 MMOL/L (ref 97–108)
CO2 SERPL-SCNC: 23 MMOL/L (ref 22–30)
CREAT SERPL-MCNC: 0.57 MG/DL (ref 0.6–1.2)
EOSINOPHIL # BLD AUTO: 0 THOUSAND/ΜL (ref 0–0.4)
EOSINOPHIL NFR BLD AUTO: 1 % (ref 0–6)
ERYTHROCYTE [DISTWIDTH] IN BLOOD BY AUTOMATED COUNT: 13.7 %
GFR SERPL CREATININE-BSD FRML MDRD: 111 ML/MIN/1.73SQ M
GLUCOSE SERPL-MCNC: 85 MG/DL (ref 70–99)
HCT VFR BLD AUTO: 41.4 % (ref 36–46)
HGB BLD-MCNC: 13.8 G/DL (ref 12–16)
LYMPHOCYTES # BLD AUTO: 1.8 THOUSANDS/ΜL (ref 0.5–4)
LYMPHOCYTES NFR BLD AUTO: 19 % (ref 25–45)
MAGNESIUM SERPL-MCNC: 2.2 MG/DL (ref 1.6–2.3)
MCH RBC QN AUTO: 30 PG (ref 26–34)
MCHC RBC AUTO-ENTMCNC: 33.4 G/DL (ref 31–36)
MCV RBC AUTO: 90 FL (ref 80–100)
MONOCYTES # BLD AUTO: 0.7 THOUSAND/ΜL (ref 0.2–0.9)
MONOCYTES NFR BLD AUTO: 8 % (ref 1–10)
NEUTROPHILS # BLD AUTO: 6.8 THOUSANDS/ΜL (ref 1.8–7.8)
NEUTS SEG NFR BLD AUTO: 72 % (ref 45–65)
PLATELET # BLD AUTO: 173 THOUSANDS/UL (ref 150–450)
PMV BLD AUTO: 10.2 FL (ref 8.9–12.7)
POTASSIUM SERPL-SCNC: 4.2 MMOL/L (ref 3.6–5)
PROT SERPL-MCNC: 6.5 G/DL (ref 5.9–8.4)
RBC # BLD AUTO: 4.61 MILLION/UL (ref 4–5.2)
SODIUM SERPL-SCNC: 137 MMOL/L (ref 137–147)
WBC # BLD AUTO: 9.5 THOUSAND/UL (ref 4.5–11)

## 2019-05-23 PROCEDURE — 99221 1ST HOSP IP/OBS SF/LOW 40: CPT | Performed by: FAMILY MEDICINE

## 2019-05-23 PROCEDURE — 36415 COLL VENOUS BLD VENIPUNCTURE: CPT | Performed by: EMERGENCY MEDICINE

## 2019-05-23 PROCEDURE — 83735 ASSAY OF MAGNESIUM: CPT | Performed by: EMERGENCY MEDICINE

## 2019-05-23 PROCEDURE — 96372 THER/PROPH/DIAG INJ SC/IM: CPT

## 2019-05-23 PROCEDURE — 80053 COMPREHEN METABOLIC PANEL: CPT | Performed by: EMERGENCY MEDICINE

## 2019-05-23 PROCEDURE — 85025 COMPLETE CBC W/AUTO DIFF WBC: CPT | Performed by: EMERGENCY MEDICINE

## 2019-05-23 RX ORDER — RISPERIDONE 1 MG/1
1 TABLET, ORALLY DISINTEGRATING ORAL EVERY 8 HOURS PRN
Status: DISCONTINUED | OUTPATIENT
Start: 2019-05-23 | End: 2019-05-30 | Stop reason: HOSPADM

## 2019-05-23 RX ORDER — DIPHENHYDRAMINE HYDROCHLORIDE 50 MG/ML
INJECTION INTRAMUSCULAR; INTRAVENOUS
Status: COMPLETED
Start: 2019-05-23 | End: 2019-05-23

## 2019-05-23 RX ORDER — IBUPROFEN 600 MG/1
600 TABLET ORAL EVERY 6 HOURS PRN
Status: DISCONTINUED | OUTPATIENT
Start: 2019-05-23 | End: 2019-05-30 | Stop reason: HOSPADM

## 2019-05-23 RX ORDER — BENZTROPINE MESYLATE 1 MG/ML
1 INJECTION INTRAMUSCULAR; INTRAVENOUS EVERY 6 HOURS PRN
Status: DISCONTINUED | OUTPATIENT
Start: 2019-05-23 | End: 2019-05-30 | Stop reason: HOSPADM

## 2019-05-23 RX ORDER — OXYBUTYNIN CHLORIDE 5 MG/1
5 TABLET, EXTENDED RELEASE ORAL DAILY
Status: DISCONTINUED | OUTPATIENT
Start: 2019-05-23 | End: 2019-05-30 | Stop reason: HOSPADM

## 2019-05-23 RX ORDER — DIPHENHYDRAMINE HYDROCHLORIDE 50 MG/ML
50 INJECTION INTRAMUSCULAR; INTRAVENOUS ONCE
Status: COMPLETED | OUTPATIENT
Start: 2019-05-23 | End: 2019-05-23

## 2019-05-23 RX ORDER — HALOPERIDOL 5 MG/ML
5 INJECTION INTRAMUSCULAR ONCE
Status: COMPLETED | OUTPATIENT
Start: 2019-05-23 | End: 2019-05-23

## 2019-05-23 RX ORDER — IBUPROFEN 400 MG/1
400 TABLET ORAL EVERY 6 HOURS PRN
Status: DISCONTINUED | OUTPATIENT
Start: 2019-05-23 | End: 2019-05-30 | Stop reason: HOSPADM

## 2019-05-23 RX ORDER — LORAZEPAM 2 MG/ML
2 INJECTION INTRAMUSCULAR ONCE
Status: COMPLETED | OUTPATIENT
Start: 2019-05-23 | End: 2019-05-23

## 2019-05-23 RX ORDER — HALOPERIDOL 5 MG
5 TABLET ORAL EVERY 8 HOURS PRN
Status: DISCONTINUED | OUTPATIENT
Start: 2019-05-23 | End: 2019-05-30 | Stop reason: HOSPADM

## 2019-05-23 RX ORDER — MONTELUKAST SODIUM 4 MG/1
1 TABLET, CHEWABLE ORAL 2 TIMES DAILY
Status: DISCONTINUED | OUTPATIENT
Start: 2019-05-23 | End: 2019-05-30 | Stop reason: HOSPADM

## 2019-05-23 RX ORDER — HALOPERIDOL 5 MG/ML
5 INJECTION INTRAMUSCULAR EVERY 8 HOURS PRN
Status: DISCONTINUED | OUTPATIENT
Start: 2019-05-23 | End: 2019-05-30 | Stop reason: HOSPADM

## 2019-05-23 RX ORDER — BENZTROPINE MESYLATE 1 MG/ML
1 INJECTION INTRAMUSCULAR; INTRAVENOUS ONCE
Status: COMPLETED | OUTPATIENT
Start: 2019-05-23 | End: 2019-05-23

## 2019-05-23 RX ORDER — LORAZEPAM 1 MG/1
1 TABLET ORAL EVERY 6 HOURS PRN
Status: DISCONTINUED | OUTPATIENT
Start: 2019-05-23 | End: 2019-05-30 | Stop reason: HOSPADM

## 2019-05-23 RX ORDER — LORAZEPAM 2 MG/ML
1 INJECTION INTRAMUSCULAR EVERY 6 HOURS PRN
Status: DISCONTINUED | OUTPATIENT
Start: 2019-05-23 | End: 2019-05-30 | Stop reason: HOSPADM

## 2019-05-23 RX ORDER — BENZTROPINE MESYLATE 1 MG/1
1 TABLET ORAL EVERY 6 HOURS PRN
Status: DISCONTINUED | OUTPATIENT
Start: 2019-05-23 | End: 2019-05-30 | Stop reason: HOSPADM

## 2019-05-23 RX ORDER — LORAZEPAM 0.5 MG/1
1 TABLET ORAL ONCE
Status: DISCONTINUED | OUTPATIENT
Start: 2019-05-23 | End: 2019-05-23

## 2019-05-23 RX ORDER — MAGNESIUM HYDROXIDE/ALUMINUM HYDROXICE/SIMETHICONE 120; 1200; 1200 MG/30ML; MG/30ML; MG/30ML
30 SUSPENSION ORAL EVERY 4 HOURS PRN
Status: DISCONTINUED | OUTPATIENT
Start: 2019-05-23 | End: 2019-05-30 | Stop reason: HOSPADM

## 2019-05-23 RX ORDER — ALBUTEROL SULFATE 90 UG/1
2 AEROSOL, METERED RESPIRATORY (INHALATION) EVERY 4 HOURS PRN
Status: DISCONTINUED | OUTPATIENT
Start: 2019-05-23 | End: 2019-05-30 | Stop reason: HOSPADM

## 2019-05-23 RX ORDER — TRAZODONE HYDROCHLORIDE 50 MG/1
50 TABLET ORAL
Status: DISCONTINUED | OUTPATIENT
Start: 2019-05-23 | End: 2019-05-30 | Stop reason: HOSPADM

## 2019-05-23 RX ORDER — IBUPROFEN 400 MG/1
800 TABLET ORAL EVERY 6 HOURS PRN
Status: DISCONTINUED | OUTPATIENT
Start: 2019-05-23 | End: 2019-05-30 | Stop reason: HOSPADM

## 2019-05-23 RX ORDER — HYDROXYZINE HYDROCHLORIDE 25 MG/1
25 TABLET, FILM COATED ORAL EVERY 6 HOURS PRN
Status: DISCONTINUED | OUTPATIENT
Start: 2019-05-23 | End: 2019-05-30 | Stop reason: HOSPADM

## 2019-05-23 RX ORDER — LORAZEPAM 2 MG/ML
1 INJECTION INTRAMUSCULAR ONCE
Status: COMPLETED | OUTPATIENT
Start: 2019-05-23 | End: 2019-05-23

## 2019-05-23 RX ADMIN — OXYBUTYNIN CHLORIDE 5 MG: 5 TABLET, EXTENDED RELEASE ORAL at 17:56

## 2019-05-23 RX ADMIN — LOSARTAN POTASSIUM 50 MG: 50 TABLET, FILM COATED ORAL at 09:58

## 2019-05-23 RX ADMIN — HALOPERIDOL LACTATE 5 MG: 5 INJECTION, SOLUTION INTRAMUSCULAR at 16:01

## 2019-05-23 RX ADMIN — PILOCARPINE HYDROCHLORIDE 5 MG: 5 TABLET, FILM COATED ORAL at 09:59

## 2019-05-23 RX ADMIN — PANTOPRAZOLE SODIUM 40 MG: 40 TABLET, DELAYED RELEASE ORAL at 09:58

## 2019-05-23 RX ADMIN — BUPROPION HYDROCHLORIDE 150 MG: 150 TABLET, FILM COATED, EXTENDED RELEASE ORAL at 09:58

## 2019-05-23 RX ADMIN — TOPIRAMATE 100 MG: 100 TABLET, FILM COATED ORAL at 17:44

## 2019-05-23 RX ADMIN — LEVOTHYROXINE SODIUM 250 MCG: 125 TABLET ORAL at 06:47

## 2019-05-23 RX ADMIN — DIPHENHYDRAMINE HYDROCHLORIDE 50 MG: 50 INJECTION INTRAMUSCULAR; INTRAVENOUS at 16:02

## 2019-05-23 RX ADMIN — TOPIRAMATE 100 MG: 100 TABLET, FILM COATED ORAL at 09:58

## 2019-05-23 RX ADMIN — LORAZEPAM 2 MG: 2 INJECTION INTRAMUSCULAR; INTRAVENOUS at 00:10

## 2019-05-23 RX ADMIN — COLESTIPOL HYDROCHLORIDE 1 G: 1 TABLET, FILM COATED ORAL at 11:02

## 2019-05-23 RX ADMIN — BUSPIRONE HYDROCHLORIDE 15 MG: 15 TABLET ORAL at 09:58

## 2019-05-23 RX ADMIN — FUROSEMIDE 20 MG: 20 TABLET ORAL at 09:58

## 2019-05-23 RX ADMIN — LORAZEPAM 1 MG: 2 INJECTION INTRAMUSCULAR; INTRAVENOUS at 12:08

## 2019-05-23 RX ADMIN — HALOPERIDOL LACTATE 5 MG: 5 INJECTION, SOLUTION INTRAMUSCULAR at 00:10

## 2019-05-23 RX ADMIN — POTASSIUM CHLORIDE 20 MEQ: 10 TABLET, EXTENDED RELEASE ORAL at 09:59

## 2019-05-23 RX ADMIN — LORAZEPAM 1 MG: 1 TABLET ORAL at 21:07

## 2019-05-23 RX ADMIN — TRAZODONE HYDROCHLORIDE 200 MG: 100 TABLET ORAL at 21:07

## 2019-05-23 RX ADMIN — LURASIDONE HYDROCHLORIDE 160 MG: 80 TABLET, FILM COATED ORAL at 21:06

## 2019-05-23 RX ADMIN — BENZTROPINE MESYLATE 1 MG: 1 INJECTION INTRAMUSCULAR; INTRAVENOUS at 00:10

## 2019-05-23 RX ADMIN — METOPROLOL TARTRATE 25 MG: 25 TABLET ORAL at 17:44

## 2019-05-23 RX ADMIN — BUSPIRONE HYDROCHLORIDE 15 MG: 15 TABLET ORAL at 17:45

## 2019-05-23 RX ADMIN — HALOPERIDOL LACTATE 5 MG: 5 INJECTION, SOLUTION INTRAMUSCULAR at 14:33

## 2019-05-24 LAB
ATRIAL RATE: 55 BPM
EST. AVERAGE GLUCOSE BLD GHB EST-MCNC: 111 MG/DL
HBA1C MFR BLD: 5.5 % (ref 4.2–6.3)
HCG SERPL QL: NEGATIVE
P AXIS: 38 DEGREES
PR INTERVAL: 192 MS
QRS AXIS: 67 DEGREES
QRSD INTERVAL: 92 MS
QT INTERVAL: 422 MS
QTC INTERVAL: 403 MS
T WAVE AXIS: 13 DEGREES
VENTRICULAR RATE: 55 BPM

## 2019-05-24 PROCEDURE — 99222 1ST HOSP IP/OBS MODERATE 55: CPT | Performed by: STUDENT IN AN ORGANIZED HEALTH CARE EDUCATION/TRAINING PROGRAM

## 2019-05-24 PROCEDURE — 84703 CHORIONIC GONADOTROPIN ASSAY: CPT | Performed by: STUDENT IN AN ORGANIZED HEALTH CARE EDUCATION/TRAINING PROGRAM

## 2019-05-24 PROCEDURE — 93010 ELECTROCARDIOGRAM REPORT: CPT | Performed by: INTERNAL MEDICINE

## 2019-05-24 PROCEDURE — 86592 SYPHILIS TEST NON-TREP QUAL: CPT | Performed by: STUDENT IN AN ORGANIZED HEALTH CARE EDUCATION/TRAINING PROGRAM

## 2019-05-24 PROCEDURE — 83036 HEMOGLOBIN GLYCOSYLATED A1C: CPT | Performed by: STUDENT IN AN ORGANIZED HEALTH CARE EDUCATION/TRAINING PROGRAM

## 2019-05-24 RX ADMIN — METOPROLOL TARTRATE 25 MG: 25 TABLET ORAL at 17:15

## 2019-05-24 RX ADMIN — PILOCARPINE HYDROCHLORIDE 5 MG: 5 TABLET, FILM COATED ORAL at 21:14

## 2019-05-24 RX ADMIN — PILOCARPINE HYDROCHLORIDE 5 MG: 5 TABLET, FILM COATED ORAL at 17:13

## 2019-05-24 RX ADMIN — POTASSIUM CHLORIDE 20 MEQ: 10 TABLET, EXTENDED RELEASE ORAL at 09:52

## 2019-05-24 RX ADMIN — BUSPIRONE HYDROCHLORIDE 15 MG: 15 TABLET ORAL at 09:52

## 2019-05-24 RX ADMIN — TOPIRAMATE 100 MG: 100 TABLET, FILM COATED ORAL at 09:53

## 2019-05-24 RX ADMIN — TRAZODONE HYDROCHLORIDE 200 MG: 100 TABLET ORAL at 21:14

## 2019-05-24 RX ADMIN — PILOCARPINE HYDROCHLORIDE 5 MG: 5 TABLET, FILM COATED ORAL at 12:05

## 2019-05-24 RX ADMIN — TOPIRAMATE 100 MG: 100 TABLET, FILM COATED ORAL at 17:15

## 2019-05-24 RX ADMIN — HALOPERIDOL 5 MG: 5 TABLET ORAL at 09:55

## 2019-05-24 RX ADMIN — BUSPIRONE HYDROCHLORIDE 15 MG: 15 TABLET ORAL at 17:15

## 2019-05-24 RX ADMIN — OXYBUTYNIN CHLORIDE 5 MG: 5 TABLET, EXTENDED RELEASE ORAL at 09:53

## 2019-05-24 RX ADMIN — COLESTIPOL HYDROCHLORIDE 1 G: 1 TABLET, FILM COATED ORAL at 17:13

## 2019-05-24 RX ADMIN — LURASIDONE HYDROCHLORIDE 160 MG: 80 TABLET, FILM COATED ORAL at 21:14

## 2019-05-24 RX ADMIN — COLESTIPOL HYDROCHLORIDE 1 G: 1 TABLET, FILM COATED ORAL at 12:06

## 2019-05-24 RX ADMIN — BUPROPION HYDROCHLORIDE 150 MG: 150 TABLET, FILM COATED, EXTENDED RELEASE ORAL at 09:53

## 2019-05-24 RX ADMIN — PANTOPRAZOLE SODIUM 40 MG: 40 TABLET, DELAYED RELEASE ORAL at 09:53

## 2019-05-25 LAB — TSH SERPL DL<=0.05 MIU/L-ACNC: <0.015 UIU/ML (ref 0.47–4.68)

## 2019-05-25 PROCEDURE — G8978 MOBILITY CURRENT STATUS: HCPCS

## 2019-05-25 PROCEDURE — G8980 MOBILITY D/C STATUS: HCPCS

## 2019-05-25 PROCEDURE — 97163 PT EVAL HIGH COMPLEX 45 MIN: CPT

## 2019-05-25 PROCEDURE — 99232 SBSQ HOSP IP/OBS MODERATE 35: CPT | Performed by: STUDENT IN AN ORGANIZED HEALTH CARE EDUCATION/TRAINING PROGRAM

## 2019-05-25 PROCEDURE — 84443 ASSAY THYROID STIM HORMONE: CPT | Performed by: FAMILY MEDICINE

## 2019-05-25 RX ADMIN — LOSARTAN POTASSIUM 50 MG: 50 TABLET, FILM COATED ORAL at 09:04

## 2019-05-25 RX ADMIN — LURASIDONE HYDROCHLORIDE 160 MG: 80 TABLET, FILM COATED ORAL at 21:20

## 2019-05-25 RX ADMIN — POTASSIUM CHLORIDE 20 MEQ: 10 TABLET, EXTENDED RELEASE ORAL at 09:04

## 2019-05-25 RX ADMIN — COLESTIPOL HYDROCHLORIDE 1 G: 1 TABLET, FILM COATED ORAL at 17:08

## 2019-05-25 RX ADMIN — FUROSEMIDE 20 MG: 20 TABLET ORAL at 09:04

## 2019-05-25 RX ADMIN — TRAZODONE HYDROCHLORIDE 200 MG: 100 TABLET ORAL at 21:20

## 2019-05-25 RX ADMIN — TOPIRAMATE 100 MG: 100 TABLET, FILM COATED ORAL at 09:04

## 2019-05-25 RX ADMIN — DIVALPROEX SODIUM 750 MG: 250 TABLET, DELAYED RELEASE ORAL at 12:21

## 2019-05-25 RX ADMIN — COLESTIPOL HYDROCHLORIDE 1 G: 1 TABLET, FILM COATED ORAL at 09:04

## 2019-05-25 RX ADMIN — HALOPERIDOL 5 MG: 5 TABLET ORAL at 18:18

## 2019-05-25 RX ADMIN — METOPROLOL TARTRATE 25 MG: 25 TABLET ORAL at 17:08

## 2019-05-25 RX ADMIN — AMLODIPINE BESYLATE 10 MG: 5 TABLET ORAL at 21:21

## 2019-05-25 RX ADMIN — BUPROPION HYDROCHLORIDE 150 MG: 150 TABLET, FILM COATED, EXTENDED RELEASE ORAL at 09:04

## 2019-05-25 RX ADMIN — MAGNESIUM HYDROXIDE 30 ML: 400 SUSPENSION ORAL at 09:09

## 2019-05-25 RX ADMIN — PILOCARPINE HYDROCHLORIDE 5 MG: 5 TABLET, FILM COATED ORAL at 16:47

## 2019-05-25 RX ADMIN — PILOCARPINE HYDROCHLORIDE 5 MG: 5 TABLET, FILM COATED ORAL at 20:23

## 2019-05-25 RX ADMIN — DIVALPROEX SODIUM 750 MG: 250 TABLET, DELAYED RELEASE ORAL at 17:08

## 2019-05-25 RX ADMIN — OXYBUTYNIN CHLORIDE 5 MG: 5 TABLET, EXTENDED RELEASE ORAL at 09:04

## 2019-05-25 RX ADMIN — PANTOPRAZOLE SODIUM 40 MG: 40 TABLET, DELAYED RELEASE ORAL at 09:04

## 2019-05-25 RX ADMIN — METOPROLOL TARTRATE 25 MG: 25 TABLET ORAL at 09:04

## 2019-05-25 RX ADMIN — BUSPIRONE HYDROCHLORIDE 15 MG: 15 TABLET ORAL at 09:04

## 2019-05-25 RX ADMIN — HALOPERIDOL 5 MG: 5 TABLET ORAL at 07:36

## 2019-05-25 RX ADMIN — TOPIRAMATE 100 MG: 100 TABLET, FILM COATED ORAL at 17:08

## 2019-05-25 RX ADMIN — PILOCARPINE HYDROCHLORIDE 5 MG: 5 TABLET, FILM COATED ORAL at 09:04

## 2019-05-26 LAB
RPR SER QL: NORMAL
T3FREE SERPL-MCNC: 1.34 PG/ML (ref 2.3–4.2)
T4 FREE SERPL-MCNC: 1.33 NG/DL (ref 0.76–1.46)
TSH SERPL DL<=0.05 MIU/L-ACNC: <0.015 UIU/ML (ref 0.47–4.68)

## 2019-05-26 PROCEDURE — 84439 ASSAY OF FREE THYROXINE: CPT | Performed by: STUDENT IN AN ORGANIZED HEALTH CARE EDUCATION/TRAINING PROGRAM

## 2019-05-26 PROCEDURE — 99232 SBSQ HOSP IP/OBS MODERATE 35: CPT | Performed by: STUDENT IN AN ORGANIZED HEALTH CARE EDUCATION/TRAINING PROGRAM

## 2019-05-26 PROCEDURE — 84481 FREE ASSAY (FT-3): CPT | Performed by: STUDENT IN AN ORGANIZED HEALTH CARE EDUCATION/TRAINING PROGRAM

## 2019-05-26 PROCEDURE — 84443 ASSAY THYROID STIM HORMONE: CPT | Performed by: STUDENT IN AN ORGANIZED HEALTH CARE EDUCATION/TRAINING PROGRAM

## 2019-05-26 RX ORDER — LACTULOSE 20 G/30ML
20 SOLUTION ORAL ONCE
Status: COMPLETED | OUTPATIENT
Start: 2019-05-26 | End: 2019-05-26

## 2019-05-26 RX ORDER — DOCUSATE SODIUM 100 MG/1
100 CAPSULE, LIQUID FILLED ORAL 2 TIMES DAILY
Status: DISCONTINUED | OUTPATIENT
Start: 2019-05-26 | End: 2019-05-30 | Stop reason: HOSPADM

## 2019-05-26 RX ADMIN — COLESTIPOL HYDROCHLORIDE 1 G: 1 TABLET, FILM COATED ORAL at 09:36

## 2019-05-26 RX ADMIN — TOPIRAMATE 100 MG: 100 TABLET, FILM COATED ORAL at 09:36

## 2019-05-26 RX ADMIN — PILOCARPINE HYDROCHLORIDE 5 MG: 5 TABLET, FILM COATED ORAL at 17:29

## 2019-05-26 RX ADMIN — DOCUSATE SODIUM 100 MG: 100 CAPSULE, LIQUID FILLED ORAL at 12:51

## 2019-05-26 RX ADMIN — PANTOPRAZOLE SODIUM 40 MG: 40 TABLET, DELAYED RELEASE ORAL at 09:36

## 2019-05-26 RX ADMIN — DIVALPROEX SODIUM 750 MG: 250 TABLET, DELAYED RELEASE ORAL at 17:26

## 2019-05-26 RX ADMIN — DIVALPROEX SODIUM 750 MG: 250 TABLET, DELAYED RELEASE ORAL at 09:35

## 2019-05-26 RX ADMIN — TOPIRAMATE 100 MG: 100 TABLET, FILM COATED ORAL at 17:27

## 2019-05-26 RX ADMIN — TRAZODONE HYDROCHLORIDE 200 MG: 100 TABLET ORAL at 21:02

## 2019-05-26 RX ADMIN — OXYBUTYNIN CHLORIDE 5 MG: 5 TABLET, EXTENDED RELEASE ORAL at 09:36

## 2019-05-26 RX ADMIN — PILOCARPINE HYDROCHLORIDE 5 MG: 5 TABLET, FILM COATED ORAL at 09:35

## 2019-05-26 RX ADMIN — PILOCARPINE HYDROCHLORIDE 5 MG: 5 TABLET, FILM COATED ORAL at 20:30

## 2019-05-26 RX ADMIN — LURASIDONE HYDROCHLORIDE 160 MG: 80 TABLET, FILM COATED ORAL at 21:02

## 2019-05-26 RX ADMIN — LACTULOSE 20 G: 10 SOLUTION ORAL at 14:08

## 2019-05-26 RX ADMIN — BUPROPION HYDROCHLORIDE 150 MG: 150 TABLET, FILM COATED, EXTENDED RELEASE ORAL at 09:35

## 2019-05-26 RX ADMIN — POTASSIUM CHLORIDE 20 MEQ: 10 TABLET, EXTENDED RELEASE ORAL at 09:36

## 2019-05-27 PROCEDURE — 99232 SBSQ HOSP IP/OBS MODERATE 35: CPT | Performed by: PSYCHIATRY & NEUROLOGY

## 2019-05-27 RX ORDER — ESTRADIOL 1 MG/1
1 TABLET ORAL DAILY
Status: DISCONTINUED | OUTPATIENT
Start: 2019-05-27 | End: 2019-05-30 | Stop reason: HOSPADM

## 2019-05-27 RX ADMIN — DIVALPROEX SODIUM 750 MG: 250 TABLET, DELAYED RELEASE ORAL at 17:30

## 2019-05-27 RX ADMIN — COLESTIPOL HYDROCHLORIDE 1 G: 1 TABLET, FILM COATED ORAL at 17:31

## 2019-05-27 RX ADMIN — DIVALPROEX SODIUM 750 MG: 250 TABLET, DELAYED RELEASE ORAL at 08:13

## 2019-05-27 RX ADMIN — MAGNESIUM HYDROXIDE 30 ML: 400 SUSPENSION ORAL at 10:05

## 2019-05-27 RX ADMIN — METOPROLOL TARTRATE 25 MG: 25 TABLET ORAL at 08:14

## 2019-05-27 RX ADMIN — BUPROPION HYDROCHLORIDE 150 MG: 150 TABLET, FILM COATED, EXTENDED RELEASE ORAL at 08:13

## 2019-05-27 RX ADMIN — LURASIDONE HYDROCHLORIDE 160 MG: 80 TABLET, FILM COATED ORAL at 21:04

## 2019-05-27 RX ADMIN — COLESTIPOL HYDROCHLORIDE 1 G: 1 TABLET, FILM COATED ORAL at 08:15

## 2019-05-27 RX ADMIN — OXYBUTYNIN CHLORIDE 5 MG: 5 TABLET, EXTENDED RELEASE ORAL at 08:13

## 2019-05-27 RX ADMIN — TOPIRAMATE 100 MG: 100 TABLET, FILM COATED ORAL at 08:14

## 2019-05-27 RX ADMIN — TOPIRAMATE 100 MG: 100 TABLET, FILM COATED ORAL at 17:31

## 2019-05-27 RX ADMIN — AMLODIPINE BESYLATE 10 MG: 5 TABLET ORAL at 21:04

## 2019-05-27 RX ADMIN — FUROSEMIDE 20 MG: 20 TABLET ORAL at 08:13

## 2019-05-27 RX ADMIN — METOPROLOL TARTRATE 25 MG: 25 TABLET ORAL at 17:30

## 2019-05-27 RX ADMIN — PANTOPRAZOLE SODIUM 40 MG: 40 TABLET, DELAYED RELEASE ORAL at 08:13

## 2019-05-27 RX ADMIN — PILOCARPINE HYDROCHLORIDE 5 MG: 5 TABLET, FILM COATED ORAL at 16:10

## 2019-05-27 RX ADMIN — ESTRADIOL 1 MG: 1 TABLET ORAL at 16:10

## 2019-05-27 RX ADMIN — DOCUSATE SODIUM 100 MG: 100 CAPSULE, LIQUID FILLED ORAL at 08:14

## 2019-05-27 RX ADMIN — DOCUSATE SODIUM 100 MG: 100 CAPSULE, LIQUID FILLED ORAL at 17:31

## 2019-05-27 RX ADMIN — TRAZODONE HYDROCHLORIDE 200 MG: 100 TABLET ORAL at 21:04

## 2019-05-27 RX ADMIN — PILOCARPINE HYDROCHLORIDE 5 MG: 5 TABLET, FILM COATED ORAL at 08:15

## 2019-05-27 RX ADMIN — PILOCARPINE HYDROCHLORIDE 5 MG: 5 TABLET, FILM COATED ORAL at 21:04

## 2019-05-27 RX ADMIN — POTASSIUM CHLORIDE 20 MEQ: 10 TABLET, EXTENDED RELEASE ORAL at 08:14

## 2019-05-27 RX ADMIN — LOSARTAN POTASSIUM 50 MG: 50 TABLET, FILM COATED ORAL at 08:13

## 2019-05-28 PROCEDURE — 99232 SBSQ HOSP IP/OBS MODERATE 35: CPT | Performed by: STUDENT IN AN ORGANIZED HEALTH CARE EDUCATION/TRAINING PROGRAM

## 2019-05-28 RX ADMIN — PILOCARPINE HYDROCHLORIDE 5 MG: 5 TABLET, FILM COATED ORAL at 21:35

## 2019-05-28 RX ADMIN — DIVALPROEX SODIUM 750 MG: 250 TABLET, DELAYED RELEASE ORAL at 08:18

## 2019-05-28 RX ADMIN — ESTRADIOL 1 MG: 1 TABLET ORAL at 08:20

## 2019-05-28 RX ADMIN — HYDROXYZINE HYDROCHLORIDE 25 MG: 25 TABLET ORAL at 11:45

## 2019-05-28 RX ADMIN — PANTOPRAZOLE SODIUM 40 MG: 40 TABLET, DELAYED RELEASE ORAL at 08:21

## 2019-05-28 RX ADMIN — DIVALPROEX SODIUM 750 MG: 250 TABLET, DELAYED RELEASE ORAL at 17:31

## 2019-05-28 RX ADMIN — DOCUSATE SODIUM 100 MG: 100 CAPSULE, LIQUID FILLED ORAL at 08:18

## 2019-05-28 RX ADMIN — LURASIDONE HYDROCHLORIDE 160 MG: 80 TABLET, FILM COATED ORAL at 21:28

## 2019-05-28 RX ADMIN — PILOCARPINE HYDROCHLORIDE 5 MG: 5 TABLET, FILM COATED ORAL at 08:21

## 2019-05-28 RX ADMIN — POTASSIUM CHLORIDE 20 MEQ: 10 TABLET, EXTENDED RELEASE ORAL at 08:18

## 2019-05-28 RX ADMIN — DOCUSATE SODIUM 100 MG: 100 CAPSULE, LIQUID FILLED ORAL at 17:31

## 2019-05-28 RX ADMIN — TRAZODONE HYDROCHLORIDE 200 MG: 100 TABLET ORAL at 21:28

## 2019-05-28 RX ADMIN — METOPROLOL TARTRATE 25 MG: 25 TABLET ORAL at 17:33

## 2019-05-28 RX ADMIN — AMLODIPINE BESYLATE 10 MG: 5 TABLET ORAL at 21:28

## 2019-05-28 RX ADMIN — BUPROPION HYDROCHLORIDE 150 MG: 150 TABLET, FILM COATED, EXTENDED RELEASE ORAL at 08:19

## 2019-05-28 RX ADMIN — PILOCARPINE HYDROCHLORIDE 5 MG: 5 TABLET, FILM COATED ORAL at 16:28

## 2019-05-28 RX ADMIN — COLESTIPOL HYDROCHLORIDE 1 G: 1 TABLET, FILM COATED ORAL at 08:20

## 2019-05-28 RX ADMIN — OXYBUTYNIN CHLORIDE 5 MG: 5 TABLET, EXTENDED RELEASE ORAL at 08:18

## 2019-05-28 RX ADMIN — TOPIRAMATE 100 MG: 100 TABLET, FILM COATED ORAL at 17:31

## 2019-05-28 RX ADMIN — TOPIRAMATE 100 MG: 100 TABLET, FILM COATED ORAL at 08:19

## 2019-05-28 RX ADMIN — COLESTIPOL HYDROCHLORIDE 1 G: 1 TABLET, FILM COATED ORAL at 17:31

## 2019-05-28 RX ADMIN — FUROSEMIDE 20 MG: 20 TABLET ORAL at 08:18

## 2019-05-28 RX ADMIN — METOPROLOL TARTRATE 25 MG: 25 TABLET ORAL at 08:18

## 2019-05-29 DIAGNOSIS — M35.00 SJOGREN'S SYNDROME, WITH UNSPECIFIED ORGAN INVOLVEMENT (HCC): ICD-10-CM

## 2019-05-29 LAB
TSH SERPL DL<=0.05 MIU/L-ACNC: 0.02 UIU/ML (ref 0.47–4.68)
VALPROATE SERPL-MCNC: 94.3 UG/ML (ref 50–120)

## 2019-05-29 PROCEDURE — 84443 ASSAY THYROID STIM HORMONE: CPT | Performed by: FAMILY MEDICINE

## 2019-05-29 PROCEDURE — 80164 ASSAY DIPROPYLACETIC ACD TOT: CPT | Performed by: STUDENT IN AN ORGANIZED HEALTH CARE EDUCATION/TRAINING PROGRAM

## 2019-05-29 PROCEDURE — 99231 SBSQ HOSP IP/OBS SF/LOW 25: CPT | Performed by: STUDENT IN AN ORGANIZED HEALTH CARE EDUCATION/TRAINING PROGRAM

## 2019-05-29 PROCEDURE — 99232 SBSQ HOSP IP/OBS MODERATE 35: CPT | Performed by: FAMILY MEDICINE

## 2019-05-29 RX ADMIN — PILOCARPINE HYDROCHLORIDE 5 MG: 5 TABLET, FILM COATED ORAL at 17:35

## 2019-05-29 RX ADMIN — LURASIDONE HYDROCHLORIDE 160 MG: 80 TABLET, FILM COATED ORAL at 17:35

## 2019-05-29 RX ADMIN — TOPIRAMATE 100 MG: 100 TABLET, FILM COATED ORAL at 08:49

## 2019-05-29 RX ADMIN — LOSARTAN POTASSIUM 50 MG: 50 TABLET, FILM COATED ORAL at 08:49

## 2019-05-29 RX ADMIN — TOPIRAMATE 100 MG: 100 TABLET, FILM COATED ORAL at 17:35

## 2019-05-29 RX ADMIN — ESTRADIOL 1 MG: 1 TABLET ORAL at 08:50

## 2019-05-29 RX ADMIN — DOCUSATE SODIUM 100 MG: 100 CAPSULE, LIQUID FILLED ORAL at 17:35

## 2019-05-29 RX ADMIN — DIVALPROEX SODIUM 750 MG: 250 TABLET, DELAYED RELEASE ORAL at 17:35

## 2019-05-29 RX ADMIN — DIVALPROEX SODIUM 750 MG: 250 TABLET, DELAYED RELEASE ORAL at 08:49

## 2019-05-29 RX ADMIN — TRAZODONE HYDROCHLORIDE 200 MG: 100 TABLET ORAL at 21:11

## 2019-05-29 RX ADMIN — PILOCARPINE HYDROCHLORIDE 5 MG: 5 TABLET, FILM COATED ORAL at 08:50

## 2019-05-29 RX ADMIN — DOCUSATE SODIUM 100 MG: 100 CAPSULE, LIQUID FILLED ORAL at 08:49

## 2019-05-29 RX ADMIN — FUROSEMIDE 20 MG: 20 TABLET ORAL at 08:49

## 2019-05-29 RX ADMIN — COLESTIPOL HYDROCHLORIDE 1 G: 1 TABLET, FILM COATED ORAL at 08:50

## 2019-05-29 RX ADMIN — POTASSIUM CHLORIDE 20 MEQ: 10 TABLET, EXTENDED RELEASE ORAL at 08:49

## 2019-05-29 RX ADMIN — BUPROPION HYDROCHLORIDE 150 MG: 150 TABLET, FILM COATED, EXTENDED RELEASE ORAL at 08:50

## 2019-05-29 RX ADMIN — METOPROLOL TARTRATE 25 MG: 25 TABLET ORAL at 17:35

## 2019-05-29 RX ADMIN — COLESTIPOL HYDROCHLORIDE 1 G: 1 TABLET, FILM COATED ORAL at 17:35

## 2019-05-29 RX ADMIN — PILOCARPINE HYDROCHLORIDE 5 MG: 5 TABLET, FILM COATED ORAL at 21:10

## 2019-05-29 RX ADMIN — PANTOPRAZOLE SODIUM 40 MG: 40 TABLET, DELAYED RELEASE ORAL at 08:50

## 2019-05-29 RX ADMIN — OXYBUTYNIN CHLORIDE 5 MG: 5 TABLET, EXTENDED RELEASE ORAL at 08:49

## 2019-05-30 VITALS
SYSTOLIC BLOOD PRESSURE: 121 MMHG | WEIGHT: 240 LBS | TEMPERATURE: 97.6 F | OXYGEN SATURATION: 97 % | RESPIRATION RATE: 16 BRPM | HEART RATE: 80 BPM | BODY MASS INDEX: 37.67 KG/M2 | DIASTOLIC BLOOD PRESSURE: 87 MMHG | HEIGHT: 67 IN

## 2019-05-30 PROCEDURE — 99239 HOSP IP/OBS DSCHRG MGMT >30: CPT | Performed by: STUDENT IN AN ORGANIZED HEALTH CARE EDUCATION/TRAINING PROGRAM

## 2019-05-30 RX ORDER — LOSARTAN POTASSIUM 50 MG/1
50 TABLET ORAL DAILY
Qty: 30 TABLET | Refills: 0 | Status: SHIPPED | OUTPATIENT
Start: 2019-05-31 | End: 2020-01-02

## 2019-05-30 RX ORDER — BUPROPION HYDROCHLORIDE 150 MG/1
150 TABLET ORAL DAILY
Qty: 30 TABLET | Refills: 0 | Status: SHIPPED | OUTPATIENT
Start: 2019-05-31 | End: 2020-01-02

## 2019-05-30 RX ORDER — PANTOPRAZOLE SODIUM 40 MG/1
40 TABLET, DELAYED RELEASE ORAL DAILY
Qty: 30 TABLET | Refills: 0 | Status: SHIPPED | OUTPATIENT
Start: 2019-05-31 | End: 2019-09-18 | Stop reason: DRUGHIGH

## 2019-05-30 RX ORDER — DIVALPROEX SODIUM 250 MG/1
750 TABLET, DELAYED RELEASE ORAL 2 TIMES DAILY
Qty: 180 TABLET | Refills: 0 | Status: SHIPPED | OUTPATIENT
Start: 2019-05-30 | End: 2020-12-18 | Stop reason: DRUGHIGH

## 2019-05-30 RX ORDER — TOPIRAMATE 100 MG/1
100 TABLET, FILM COATED ORAL 2 TIMES DAILY
Qty: 60 TABLET | Refills: 0 | Status: SHIPPED | OUTPATIENT
Start: 2019-05-30 | End: 2019-05-30 | Stop reason: HOSPADM

## 2019-05-30 RX ORDER — FUROSEMIDE 20 MG/1
20 TABLET ORAL DAILY
Qty: 30 TABLET | Refills: 0 | Status: SHIPPED | OUTPATIENT
Start: 2019-05-31 | End: 2019-06-12 | Stop reason: SDUPTHER

## 2019-05-30 RX ORDER — DOCUSATE SODIUM 100 MG/1
100 CAPSULE, LIQUID FILLED ORAL 2 TIMES DAILY
Qty: 60 CAPSULE | Refills: 0 | Status: SHIPPED | OUTPATIENT
Start: 2019-05-30 | End: 2019-09-18 | Stop reason: ALTCHOICE

## 2019-05-30 RX ORDER — PILOCARPINE HYDROCHLORIDE 5 MG/1
5 TABLET, FILM COATED ORAL 3 TIMES DAILY
Qty: 60 TABLET | Refills: 0 | Status: SHIPPED | OUTPATIENT
Start: 2019-05-30 | End: 2020-12-18 | Stop reason: SDUPTHER

## 2019-05-30 RX ORDER — TOPIRAMATE 50 MG/1
50 TABLET, FILM COATED ORAL 2 TIMES DAILY
Qty: 60 TABLET | Refills: 0 | Status: SHIPPED | OUTPATIENT
Start: 2019-05-30 | End: 2019-06-12 | Stop reason: ALTCHOICE

## 2019-05-30 RX ORDER — MONTELUKAST SODIUM 4 MG/1
1 TABLET, CHEWABLE ORAL 2 TIMES DAILY
Qty: 60 TABLET | Refills: 0 | Status: SHIPPED | OUTPATIENT
Start: 2019-05-30 | End: 2021-08-28

## 2019-05-30 RX ORDER — TOPIRAMATE 25 MG/1
50 TABLET ORAL 2 TIMES DAILY
Status: DISCONTINUED | OUTPATIENT
Start: 2019-05-30 | End: 2019-05-30 | Stop reason: HOSPADM

## 2019-05-30 RX ORDER — OXYBUTYNIN CHLORIDE 5 MG/1
5 TABLET, EXTENDED RELEASE ORAL DAILY
Qty: 30 TABLET | Refills: 0 | Status: SHIPPED | OUTPATIENT
Start: 2019-05-31 | End: 2019-06-12

## 2019-05-30 RX ORDER — TRAZODONE HYDROCHLORIDE 100 MG/1
200 TABLET ORAL
Qty: 60 TABLET | Refills: 0 | Status: SHIPPED | OUTPATIENT
Start: 2019-05-30 | End: 2020-09-15 | Stop reason: DRUGHIGH

## 2019-05-30 RX ADMIN — DIVALPROEX SODIUM 750 MG: 250 TABLET, DELAYED RELEASE ORAL at 08:01

## 2019-05-30 RX ADMIN — METOPROLOL TARTRATE 25 MG: 25 TABLET ORAL at 08:01

## 2019-05-30 RX ADMIN — COLESTIPOL HYDROCHLORIDE 1 G: 1 TABLET, FILM COATED ORAL at 08:02

## 2019-05-30 RX ADMIN — OXYBUTYNIN CHLORIDE 5 MG: 5 TABLET, EXTENDED RELEASE ORAL at 08:01

## 2019-05-30 RX ADMIN — POTASSIUM CHLORIDE 20 MEQ: 10 TABLET, EXTENDED RELEASE ORAL at 08:01

## 2019-05-30 RX ADMIN — ESTRADIOL 1 MG: 1 TABLET ORAL at 08:04

## 2019-05-30 RX ADMIN — BUPROPION HYDROCHLORIDE 150 MG: 150 TABLET, FILM COATED, EXTENDED RELEASE ORAL at 08:01

## 2019-05-30 RX ADMIN — LOSARTAN POTASSIUM 50 MG: 50 TABLET, FILM COATED ORAL at 08:01

## 2019-05-30 RX ADMIN — TOPIRAMATE 100 MG: 100 TABLET, FILM COATED ORAL at 08:01

## 2019-05-30 RX ADMIN — PANTOPRAZOLE SODIUM 40 MG: 40 TABLET, DELAYED RELEASE ORAL at 08:01

## 2019-05-30 RX ADMIN — FUROSEMIDE 20 MG: 20 TABLET ORAL at 08:01

## 2019-05-30 RX ADMIN — DOCUSATE SODIUM 100 MG: 100 CAPSULE, LIQUID FILLED ORAL at 08:01

## 2019-05-30 RX ADMIN — PILOCARPINE HYDROCHLORIDE 5 MG: 5 TABLET, FILM COATED ORAL at 08:03

## 2019-05-31 RX ORDER — PILOCARPINE HYDROCHLORIDE 5 MG/1
5 TABLET, FILM COATED ORAL 3 TIMES DAILY
Qty: 90 TABLET | Refills: 1 | OUTPATIENT
Start: 2019-05-31

## 2019-06-03 DIAGNOSIS — M35.00 SJOGREN'S SYNDROME, WITH UNSPECIFIED ORGAN INVOLVEMENT (HCC): ICD-10-CM

## 2019-06-04 DIAGNOSIS — I10 HYPERTENSION, UNSPECIFIED TYPE: ICD-10-CM

## 2019-06-04 RX ORDER — AMLODIPINE BESYLATE 10 MG/1
10 TABLET ORAL
Qty: 90 TABLET | Refills: 1 | Status: SHIPPED | OUTPATIENT
Start: 2019-06-04 | End: 2019-06-11

## 2019-06-04 RX ORDER — PILOCARPINE HYDROCHLORIDE 5 MG/1
5 TABLET, FILM COATED ORAL 3 TIMES DAILY
Qty: 90 TABLET | Refills: 1 | Status: SHIPPED | OUTPATIENT
Start: 2019-06-04 | End: 2019-06-11

## 2019-06-11 ENCOUNTER — TRANSITIONAL CARE MANAGEMENT (OUTPATIENT)
Dept: FAMILY MEDICINE CLINIC | Facility: CLINIC | Age: 49
End: 2019-06-11

## 2019-06-11 RX ORDER — POTASSIUM CHLORIDE 20 MEQ/1
TABLET, EXTENDED RELEASE ORAL
COMMUNITY
Start: 2018-06-16 | End: 2019-06-12 | Stop reason: ALTCHOICE

## 2019-06-11 RX ORDER — QUETIAPINE FUMARATE 200 MG/1
TABLET, FILM COATED ORAL
COMMUNITY
Start: 2018-07-11 | End: 2019-06-12 | Stop reason: ALTCHOICE

## 2019-06-11 RX ORDER — TIZANIDINE 4 MG/1
TABLET ORAL
COMMUNITY
Start: 2018-07-27 | End: 2019-06-12 | Stop reason: ALTCHOICE

## 2019-06-11 RX ORDER — FESOTERODINE FUMARATE 4 MG/1
1 TABLET, FILM COATED, EXTENDED RELEASE ORAL DAILY
Refills: 2 | COMMUNITY
Start: 2019-06-06 | End: 2019-06-12 | Stop reason: ALTCHOICE

## 2019-06-11 RX ORDER — LEVOTHYROXINE SODIUM 0.12 MG/1
TABLET ORAL
Refills: 2 | COMMUNITY
Start: 2019-06-06 | End: 2019-06-12 | Stop reason: ALTCHOICE

## 2019-06-11 RX ORDER — DICYCLOMINE HYDROCHLORIDE 10 MG/1
CAPSULE ORAL
COMMUNITY
Start: 2018-05-22 | End: 2019-06-12 | Stop reason: ALTCHOICE

## 2019-06-11 RX ORDER — TOPIRAMATE 100 MG/1
100 TABLET, FILM COATED ORAL 2 TIMES DAILY
Refills: 2 | COMMUNITY
Start: 2019-06-03 | End: 2019-06-12 | Stop reason: ALTCHOICE

## 2019-06-11 RX ORDER — CHLORPROMAZINE HYDROCHLORIDE 50 MG/1
50 TABLET, FILM COATED ORAL 3 TIMES DAILY PRN
Refills: 2 | COMMUNITY
Start: 2019-06-03 | End: 2019-06-12 | Stop reason: ALTCHOICE

## 2019-06-11 RX ORDER — HYDROCODONE BITARTRATE AND ACETAMINOPHEN 7.5; 325 MG/1; MG/1
TABLET ORAL
COMMUNITY
Start: 2018-06-25 | End: 2019-06-12 | Stop reason: ALTCHOICE

## 2019-06-11 RX ORDER — METOPROLOL TARTRATE 50 MG/1
TABLET, FILM COATED ORAL
Refills: 3 | COMMUNITY
Start: 2019-06-08 | End: 2019-06-12 | Stop reason: ALTCHOICE

## 2019-06-11 RX ORDER — BUPROPION HYDROCHLORIDE 150 MG/1
TABLET ORAL
COMMUNITY
Start: 2018-07-11 | End: 2019-06-12 | Stop reason: SDUPTHER

## 2019-06-11 RX ORDER — SERTRALINE HYDROCHLORIDE 100 MG/1
TABLET, FILM COATED ORAL
COMMUNITY
Start: 2018-07-11 | End: 2019-06-12 | Stop reason: ALTCHOICE

## 2019-06-12 ENCOUNTER — OFFICE VISIT (OUTPATIENT)
Dept: FAMILY MEDICINE CLINIC | Facility: CLINIC | Age: 49
End: 2019-06-12
Payer: MEDICARE

## 2019-06-12 VITALS
DIASTOLIC BLOOD PRESSURE: 80 MMHG | HEIGHT: 65 IN | SYSTOLIC BLOOD PRESSURE: 124 MMHG | TEMPERATURE: 97.4 F | OXYGEN SATURATION: 99 % | HEART RATE: 64 BPM | WEIGHT: 257.7 LBS | BODY MASS INDEX: 42.93 KG/M2

## 2019-06-12 DIAGNOSIS — R32 URINARY INCONTINENCE, UNSPECIFIED TYPE: ICD-10-CM

## 2019-06-12 DIAGNOSIS — M51.36 DDD (DEGENERATIVE DISC DISEASE), LUMBAR: ICD-10-CM

## 2019-06-12 DIAGNOSIS — J44.1 CHRONIC OBSTRUCTIVE PULMONARY DISEASE WITH ACUTE EXACERBATION (HCC): ICD-10-CM

## 2019-06-12 DIAGNOSIS — G89.29 CHRONIC PAIN OF RIGHT KNEE: ICD-10-CM

## 2019-06-12 DIAGNOSIS — M25.561 CHRONIC PAIN OF RIGHT KNEE: ICD-10-CM

## 2019-06-12 DIAGNOSIS — F33.2 SEVERE EPISODE OF RECURRENT MAJOR DEPRESSIVE DISORDER, WITHOUT PSYCHOTIC FEATURES (HCC): ICD-10-CM

## 2019-06-12 DIAGNOSIS — E03.9 ACQUIRED HYPOTHYROIDISM: Primary | ICD-10-CM

## 2019-06-12 DIAGNOSIS — I10 HYPERTENSION, UNSPECIFIED TYPE: ICD-10-CM

## 2019-06-12 LAB
T3FREE SERPL-MCNC: 1.82 PG/ML (ref 2.3–4.2)
TSH SERPL DL<=0.05 MIU/L-ACNC: 4.85 UIU/ML (ref 0.36–3.74)

## 2019-06-12 PROCEDURE — 36415 COLL VENOUS BLD VENIPUNCTURE: CPT | Performed by: NURSE PRACTITIONER

## 2019-06-12 PROCEDURE — 99495 TRANSJ CARE MGMT MOD F2F 14D: CPT | Performed by: NURSE PRACTITIONER

## 2019-06-12 PROCEDURE — 84481 FREE ASSAY (FT-3): CPT | Performed by: NURSE PRACTITIONER

## 2019-06-12 PROCEDURE — 84443 ASSAY THYROID STIM HORMONE: CPT | Performed by: NURSE PRACTITIONER

## 2019-06-12 RX ORDER — FUROSEMIDE 20 MG/1
20 TABLET ORAL DAILY
Qty: 30 TABLET | Refills: 5 | Status: SHIPPED | OUTPATIENT
Start: 2019-06-12 | End: 2019-11-01 | Stop reason: SDUPTHER

## 2019-06-12 RX ORDER — FESOTERODINE FUMARATE 4 MG/1
1 TABLET, FILM COATED, EXTENDED RELEASE ORAL DAILY
Refills: 2
Start: 2019-06-12 | End: 2021-08-28

## 2019-06-12 RX ORDER — FUROSEMIDE 20 MG/1
20 TABLET ORAL DAILY
Qty: 30 TABLET | Refills: 0 | Status: SHIPPED | OUTPATIENT
Start: 2019-06-12 | End: 2019-06-12 | Stop reason: SDUPTHER

## 2019-06-18 ENCOUNTER — TELEPHONE (OUTPATIENT)
Dept: FAMILY MEDICINE CLINIC | Facility: CLINIC | Age: 49
End: 2019-06-18

## 2019-07-05 RX ORDER — CHLORPROMAZINE HYDROCHLORIDE 50 MG/1
50 TABLET, FILM COATED ORAL 3 TIMES DAILY PRN
Refills: 2 | COMMUNITY
Start: 2019-06-17 | End: 2019-07-10 | Stop reason: ALTCHOICE

## 2019-07-10 ENCOUNTER — OFFICE VISIT (OUTPATIENT)
Dept: FAMILY MEDICINE CLINIC | Facility: CLINIC | Age: 49
End: 2019-07-10
Payer: MEDICARE

## 2019-07-10 VITALS
DIASTOLIC BLOOD PRESSURE: 80 MMHG | SYSTOLIC BLOOD PRESSURE: 124 MMHG | WEIGHT: 265.8 LBS | TEMPERATURE: 96.4 F | BODY MASS INDEX: 44.28 KG/M2 | HEART RATE: 56 BPM | HEIGHT: 65 IN

## 2019-07-10 DIAGNOSIS — Z11.59 ENCOUNTER FOR HEPATITIS C SCREENING TEST FOR LOW RISK PATIENT: ICD-10-CM

## 2019-07-10 DIAGNOSIS — I10 BENIGN ESSENTIAL HYPERTENSION: ICD-10-CM

## 2019-07-10 DIAGNOSIS — E03.9 ACQUIRED HYPOTHYROIDISM: ICD-10-CM

## 2019-07-10 DIAGNOSIS — I10 ESSENTIAL HYPERTENSION, BENIGN: ICD-10-CM

## 2019-07-10 DIAGNOSIS — Z00.00 MEDICARE ANNUAL WELLNESS VISIT, INITIAL: Primary | ICD-10-CM

## 2019-07-10 LAB
ANION GAP SERPL CALCULATED.3IONS-SCNC: 7 MMOL/L (ref 4–13)
BUN SERPL-MCNC: 13 MG/DL (ref 5–25)
CALCIUM SERPL-MCNC: 8.8 MG/DL (ref 8.3–10.1)
CHLORIDE SERPL-SCNC: 106 MMOL/L (ref 100–108)
CHOLEST SERPL-MCNC: 212 MG/DL (ref 50–200)
CO2 SERPL-SCNC: 27 MMOL/L (ref 21–32)
CREAT SERPL-MCNC: 0.65 MG/DL (ref 0.6–1.3)
GFR SERPL CREATININE-BSD FRML MDRD: 106 ML/MIN/1.73SQ M
GLUCOSE P FAST SERPL-MCNC: 80 MG/DL (ref 65–99)
HDLC SERPL-MCNC: 67 MG/DL (ref 40–60)
LDLC SERPL CALC-MCNC: 123 MG/DL (ref 0–100)
NONHDLC SERPL-MCNC: 145 MG/DL
POTASSIUM SERPL-SCNC: 4.2 MMOL/L (ref 3.5–5.3)
SODIUM SERPL-SCNC: 140 MMOL/L (ref 136–145)
T4 FREE SERPL-MCNC: 0.94 NG/DL (ref 0.76–1.46)
TRIGL SERPL-MCNC: 112 MG/DL
TSH SERPL DL<=0.05 MIU/L-ACNC: 7.49 UIU/ML (ref 0.36–3.74)

## 2019-07-10 PROCEDURE — 80048 BASIC METABOLIC PNL TOTAL CA: CPT | Performed by: NURSE PRACTITIONER

## 2019-07-10 PROCEDURE — 80061 LIPID PANEL: CPT | Performed by: NURSE PRACTITIONER

## 2019-07-10 PROCEDURE — 36415 COLL VENOUS BLD VENIPUNCTURE: CPT | Performed by: NURSE PRACTITIONER

## 2019-07-10 PROCEDURE — 86803 HEPATITIS C AB TEST: CPT | Performed by: NURSE PRACTITIONER

## 2019-07-10 PROCEDURE — 84443 ASSAY THYROID STIM HORMONE: CPT | Performed by: NURSE PRACTITIONER

## 2019-07-10 PROCEDURE — 84439 ASSAY OF FREE THYROXINE: CPT | Performed by: NURSE PRACTITIONER

## 2019-07-10 PROCEDURE — G0438 PPPS, INITIAL VISIT: HCPCS | Performed by: NURSE PRACTITIONER

## 2019-07-10 RX ORDER — POTASSIUM CHLORIDE 20 MEQ/1
TABLET, EXTENDED RELEASE ORAL
Refills: 12 | COMMUNITY
Start: 2019-07-03 | End: 2019-09-18 | Stop reason: ALTCHOICE

## 2019-07-10 RX ORDER — TOPIRAMATE 50 MG/1
TABLET, FILM COATED ORAL
Refills: 1 | COMMUNITY
Start: 2019-07-08 | End: 2019-09-18 | Stop reason: DRUGHIGH

## 2019-07-10 RX ORDER — TOPIRAMATE 100 MG/1
100 TABLET, FILM COATED ORAL DAILY
Refills: 2 | COMMUNITY
Start: 2019-06-26 | End: 2019-09-18 | Stop reason: DRUGHIGH

## 2019-07-10 NOTE — PATIENT INSTRUCTIONS
Obesity   AMBULATORY CARE:   Obesity  is when your body mass index (BMI) is greater than 30  Your healthcare provider will use your height and weight to measure your BMI  The risks of obesity include  many health problems, such as injuries or physical disability  You may need tests to check for the following:  · Diabetes     · High blood pressure or high cholesterol     · Heart disease     · Gallbladder or liver disease     · Cancer of the colon, breast, prostate, liver, or kidney     · Sleep apnea     · Arthritis or gout  Seek care immediately if:   · You have a severe headache, confusion, or difficulty speaking  · You have weakness on one side of your body  · You have chest pain, sweating, or shortness of breath  Contact your healthcare provider if:   · You have symptoms of gallbladder or liver disease, such as pain in your upper abdomen  · You have knee or hip pain and discomfort while walking  · You have symptoms of diabetes, such as intense hunger and thirst, and frequent urination  · You have symptoms of sleep apnea, such as snoring or daytime sleepiness  · You have questions or concerns about your condition or care  Treatment for obesity  focuses on helping you lose weight to improve your health  Even a small decrease in BMI can reduce the risk for many health problems  Your healthcare provider will help you set a weight-loss goal   · Lifestyle changes  are the first step in treating obesity  These include making healthy food choices and getting regular physical activity  Your healthcare provider may suggest a weight-loss program that involves coaching, education, and therapy  · Medicine  may help you lose weight when it is used with a healthy diet and physical activity  · Surgery  can help you lose weight if you are very obese and have other health problems  There are several types of weight-loss surgery  Ask your healthcare provider for more information    Be successful losing weight:   · Set small, realistic goals  An example of a small goal is to walk for 20 minutes 5 days a week  Anther goal is to lose 5% of your body weight  · Tell friends, family members, and coworkers about your goals  and ask for their support  Ask a friend to lose weight with you, or join a weight-loss support group  · Identify foods or triggers that may cause you to overeat , and find ways to avoid them  Remove tempting high-calorie foods from your home and workplace  Place a bowl of fresh fruit on your kitchen counter  If stress causes you to eat, then find other ways to cope with stress  · Keep a diary to track what you eat and drink  Also write down how many minutes of physical activity you do each day  Weigh yourself once a week and record it in your diary  Eating changes: You will need to eat 500 to 1,000 fewer calories each day than you currently eat to lose 1 to 2 pounds a week  The following changes will help you cut calories:  · Eat smaller portions  Use small plates, no larger than 9 inches in diameter  Fill your plate half full of fruits and vegetables  Measure your food using measuring cups until you know what a serving size looks like  · Eat 3 meals and 1 or 2 snacks each day  Plan your meals in advance  Elsa Silence and eat at home most of the time  Eat slowly  · Eat fruits and vegetables at every meal   They are low in calories and high in fiber, which makes you feel full  Do not add butter, margarine, or cream sauce to vegetables  Use herbs to season steamed vegetables  · Eat less fat and fewer fried foods  Eat more baked or grilled chicken and fish  These protein sources are lower in calories and fat than red meat  Limit fast food  Dress your salads with olive oil and vinegar instead of bottled dressing  · Limit the amount of sugar you eat  Do not drink sugary beverages  Limit alcohol  Activity changes:  Physical activity is good for your body in many ways   It helps you burn calories and build strong muscles  It decreases stress and depression, and improves your mood  It can also help you sleep better  Talk to your healthcare provider before you begin an exercise program   · Exercise for at least 30 minutes 5 days a week  Start slowly  Set aside time each day for physical activity that you enjoy and that is convenient for you  It is best to do both weight training and an activity that increases your heart rate, such as walking, bicycling, or swimming  · Find ways to be more active  Do yard work and housecleaning  Walk up the stairs instead of using elevators  Spend your leisure time going to events that require walking, such as outdoor festivals or fairs  This extra physical activity can help you lose weight and keep it off  Follow up with your healthcare provider as directed: You may need to meet with a dietitian  Write down your questions so you remember to ask them during your visits  © 2017 [address] [employer] Information is for End User's use only and may not be sold, redistributed or otherwise used for commercial purposes  All illustrations and images included in CareNotes® are the copyrighted property of [employer]  or Trino Zheng  The above information is an  only  It is not intended as medical advice for individual conditions or treatments  Talk to your doctor, nurse or pharmacist before following any medical regimen to see if it is safe and effective for you  Urinary Incontinence   WHAT YOU NEED TO KNOW:   What is urinary incontinence? Urinary incontinence (UI) is when you lose control of your bladder  What causes UI? UI occurs because your bladder cannot store or empty urine properly  The following are the most common types of UI:  · Stress incontinence  is when you leak urine due to increased bladder pressure  This may happen when you cough, sneeze, or exercise       · Urge incontinence  is when you feel the need to urinate right away and leak urine accidentally  · Mixed incontinence  is when you have both stress and urge UI  What are the signs and symptoms of UI?   · You feel like your bladder does not empty completely when you urinate  · You urinate often and need to urinate immediately  · You leak urine when you sleep, or you wake up with the urge to urinate  · You leak urine when you cough, sneeze, exercise, or laugh  How is UI diagnosed? Your healthcare provider will ask how often you leak urine and whether you have stress or urge symptoms  Tell him which medicines you take, how often you urinate, and how much liquid you drink each day  You may need any of the following tests:  · Urine tests  may show infection or kidney function  · A pelvic exam  may be done to check for blockages  A pelvic exam will also show if your bladder, uterus, or other organs have moved out of place  · An x-ray, ultrasound, or CT  may show problems with parts of your urinary system  You may be given contrast liquid to help your organs show up better in the pictures  Tell the healthcare provider if you have ever had an allergic reaction to contrast liquid  Do not enter the MRI room with anything metal  Metal can cause serious injury  Tell the healthcare provider if you have any metal in or on your body  · A bladder scan  will show how much urine is left in your bladder after you urinate  You will be asked to urinate and then healthcare providers will use a small ultrasound machine to check the urine left in your bladder  · Cystometry  is used to check the function of your urinary system  Your healthcare provider checks the pressure in your bladder while filling it with fluid  Your bladder pressure may also be tested when your bladder is full and while you urinate  How is UI treated? · Medicines  can help strengthen your bladder control      · Electrical stimulation  is used to send a small amount of electrical energy to your pelvic floor muscles  This helps control your bladder function  Electrodes may be placed outside your body or in your rectum  For women, the electrodes may be placed in the vagina  · A bulking agent  may be injected into the wall of your urethra to make it thicker  This helps keep your urethra closed and decreases urine leakage  · Devices  such as a clamp, pessary, or tampon may help stop urine leaks  Ask your healthcare provider for more information about these and other devices  · Surgery  may be needed if other treatments do not work  Several types of surgery can help improve your bladder control  Ask your healthcare provider for more information about the surgery you may need  How can I manage my symptoms? · Do pelvic muscle exercises often  Your pelvic muscles help you stop urinating  Squeeze these muscles tight for 5 seconds, then relax for 5 seconds  Gradually work up to squeezing for 10 seconds  Do 3 sets of 15 repetitions a day, or as directed  This will help strengthen your pelvic muscles and improve bladder control  · A catheter  may be used to help empty your bladder  A catheter is a tiny, plastic tube that is put into your bladder to drain your urine  Your healthcare provider may tell you to use a catheter to prevent your bladder from getting too full and leaking urine  · Keep a UI record  Write down how often you leak urine and how much you leak  Make a note of what you were doing when you leaked urine  · Train your bladder  Go to the bathroom at set times, such as every 2 hours, even if you do not feel the urge to go  You can also try to hold your urine when you feel the urge to go  For example, hold your urine for 5 minutes when you feel the urge to go  As that becomes easier, hold your urine for 10 minutes  · Drink liquids as directed  Ask your healthcare provider how much liquid to drink each day and which liquids are best for you   You may need to limit the amount of liquid you drink to help control your urine leakage  Limit or do not have drinks that contain caffeine or alcohol  Do not drink any liquid right before you go to bed  · Prevent constipation  Eat a variety of high-fiber foods  Good examples are high-fiber cereals, beans, vegetables, and whole-grain breads  Prune juice may help make your bowel movement softer  Walking is the best way to trigger your intestines to have a bowel movement  · Exercise regularly and maintain a healthy weight  Ask your healthcare provider how much you should weigh and about the best exercise plan for you  Weight loss and exercise will decrease pressure on your bladder and help you control your leakage  Ask him to help you create a weight loss plan if you are overweight  When should I seek immediate care? · You have severe pain  · You are confused or cannot think clearly  When should I contact my healthcare provider? · You have a fever  · You see blood in your urine  · You have pain when you urinate  · You have new or worse pain, even after treatment  · Your mouth feels dry or you have vision changes  · Your urine is cloudy or smells bad  · You have questions or concerns about your condition or care  CARE AGREEMENT:   You have the right to help plan your care  Learn about your health condition and how it may be treated  Discuss treatment options with your caregivers to decide what care you want to receive  You always have the right to refuse treatment  The above information is an  only  It is not intended as medical advice for individual conditions or treatments  Talk to your doctor, nurse or pharmacist before following any medical regimen to see if it is safe and effective for you  © 2017 [address] [employer] Information is for End User's use only and may not be sold, redistributed or otherwise used for commercial purposes   All illustrations and images included in CareNotes® are the copyrighted property of [employer]  or Trino Zheng  Cigarette Smoking and Your Health   AMBULATORY CARE:   Risks to your health if you smoke:  Nicotine and other chemicals found in tobacco damage every cell in your body  Even if you are a light smoker, you have an increased risk for cancer, heart disease, and lung disease  If you are pregnant or have diabetes, smoking increases your risk for complications  Benefits to your health if you stop smoking:   · You decrease respiratory symptoms such as coughing, wheezing, and shortness of breath  · You reduce your risk for cancers of the lung, mouth, throat, kidney, bladder, pancreas, stomach, and cervix  If you already have cancer, you increase the benefits of chemotherapy  You also reduce your risk for cancer returning or a second cancer from developing  · You reduce your risk for heart disease, blood clots, heart attack, and stroke  · You reduce your risk for lung infections, and diseases such as pneumonia, asthma, chronic bronchitis, and emphysema  · Your circulation improves  More oxygen can be delivered to your body  If you have diabetes, you lower your risk for complications, such as kidney, artery, and eye diseases  You also lower your risk for nerve damage  Nerve damage can lead to amputations, poor vision, and blindness  · You improve your body's ability to heal and to fight infections  Benefits to the health of others if you stop smoking:  Tobacco is harmful to nonsmokers who breathe in your secondhand smoke  The following are ways the health of others around you may improve when you stop smoking:  · You lower the risks for lung cancer and heart disease in nonsmoking adults  · If you are pregnant, you lower the risk for miscarriage, early delivery, low birth weight, and stillbirth  You also lower your baby's risk for SIDS, obesity, developmental delay, and neurobehavioral problems, such as ADHD  · If you have children, you lower their risk for ear infections, colds, pneumonia, bronchitis, and asthma  For more information and support to stop smoking:   · Smokefree  gov  Phone: 4- 775 - 872-2335  Web Address: www smokefree  gov  Follow up with your healthcare provider as directed:  Write down your questions so you remember to ask them during your visits  © 2017 [address] Rosas Information is for End User's use only and may not be sold, redistributed or otherwise used for commercial purposes  All illustrations and images included in CareNotes® are the copyrighted property of [employer]  or Trino Zheng  The above information is an  only  It is not intended as medical advice for individual conditions or treatments  Talk to your doctor, nurse or pharmacist before following any medical regimen to see if it is safe and effective for you  Fall Prevention   AMBULATORY CARE:   Fall prevention  includes ways to make your home and other areas safer  It also includes ways you can move more carefully to prevent a fall  Health conditions that cause changes in your blood pressure, vision, or muscle strength and coordination may increase your risk for falls  Medicines may also increase your risk for falls if they make you dizzy, weak, or sleepy  Call 911 or have someone else call if:   · You have fallen and are unconscious  · You have fallen and cannot move part of your body  Contact your healthcare provider if:   · You have fallen and have pain or a headache  · You have questions or concerns about your condition or care  Fall prevention tips:   · Stand or sit up slowly  This may help you keep your balance and prevent falls  · Use assistive devices as directed  Your healthcare provider may suggest that you use a cane or walker to help you keep your balance  You may need to have grab bars put in your bathroom near the toilet or in the shower      · Wear shoes that fit well and have soles that   Wear shoes both inside and outside  Use slippers with good   Do not wear shoes with high heels  · Wear a personal alarm  This is a device that allows you to call 911 if you fall and need help  Ask your healthcare provider for more information  · Stay active  Exercise can help strengthen your muscles and improve your balance  Your healthcare provider may recommend water aerobics or walking  He or she may also recommend physical therapy to improve your coordination  Never start an exercise program without talking to your healthcare provider first      · Manage your medical conditions  Keep all appointments with your healthcare providers  Visit your eye doctor as directed  Home safety tips:   · Add items to prevent falls in the bathroom  Put nonslip strips on your bath or shower floor to prevent you from slipping  Use a bath mat if you do not have carpet in the bathroom  This will prevent you from falling when you step out of the bath or shower  Use a shower seat so you do not need to stand while you shower  Sit on the toilet or a chair in your bathroom to dry yourself and put on clothing  This will prevent you from losing your balance from drying or dressing yourself while you are standing  · Keep paths clear  Remove books, shoes, and other objects from walkways and stairs  Place cords for telephones and lamps out of the way so that you do not need to walk over them  Tape them down if you cannot move them  Remove small rugs  If you cannot remove a rug, secure it with double-sided tape  This will prevent you from tripping  · Install bright lights in your home  Use night lights to help light paths to the bathroom or kitchen  Always turn on the light before you start walking  · Keep items you use often on shelves within reach  Do not use a step stool to help you reach an item  · Paint or place reflective tape on the edges of your stairs    This will help you see the stairs better  Follow up with your healthcare provider as directed:  Write down your questions so you remember to ask them during your visits  © 2017 [address] [employer] Information is for End User's use only and may not be sold, redistributed or otherwise used for commercial purposes  All illustrations and images included in CareNotes® are the copyrighted property of [employer]  or Trino Zheng  The above information is an  only  It is not intended as medical advice for individual conditions or treatments  Talk to your doctor, nurse or pharmacist before following any medical regimen to see if it is safe and effective for you  Advance Directives   WHAT YOU NEED TO KNOW:   What are advance directives? Advance directives are legal documents that state your wishes and plans for medical care  These plans are made ahead of time in case you lose your ability to make decisions for yourself  Advance directives can apply to any medical decision, such as the treatments you want, and if you want to donate organs  What are the types of advance directives? There are many types of advance directives, and each state has rules about how to use them  You may choose a combination of any of the following:  · Living will: This is a written record of the treatment you want  You can also choose which treatments you do not want, which to limit, and which to stop at a certain time  This includes surgery, medicine, IV fluid, and tube feedings  · Durable power of  for healthcare [facility]): This is a written record that states who you want to make healthcare choices for you when you are unable to make them for yourself  This person, called a proxy, is usually a family member or a friend  You may choose more than 1 proxy  · Do not resuscitate (DNR) order:  A DNR order is used in case your heart stops beating or you stop breathing   It is a request not to have certain forms of treatment, such as CPR  A DNR order may be included in other types of advance directives  · Medical directive: This covers the care that you want if you are in a coma, near death, or unable to make decisions for yourself  You can list the treatments you want for each condition  Treatment may include pain medicine, surgery, blood transfusions, dialysis, IV or tube feedings, and a ventilator (breathing machine)  · Values history: This document has questions about your views, beliefs, and how you feel and think about life  This information can help others choose the care that you would choose  Why are advance directives important? An advance directive helps you control your care  Although spoken wishes may be used, it is better to have your wishes written down  Spoken wishes can be misunderstood, or not followed  Treatments may be given even if you do not want them  An advance directive may make it easier for your family to make difficult choices about your care  How do I decide what to put in my advance directives? · Make informed decisions:  Make sure you fully understand treatments or care you may receive  Think about the benefits and problems your decisions could cause for you or your family  Talk to healthcare providers if you have concerns or questions before you write down your wishes  You may also want to talk with your Lutheran or , or a   Check your state laws to make sure that what you put in your advance directive is legal      · Sign all forms:  Sign and date your advance directive when you have finished  You may also need 2 witnesses to sign the forms  Witnesses cannot be your doctor or his staff, your spouse, heirs or beneficiaries, people you owe money to, or your chosen proxy  Talk to your family, proxy, and healthcare providers about your advance directive  Give each person a copy, and keep one for yourself in a place you can get to easily   Do not keep it hidden or locked away  · Review and revise your plans: You can revise your advance directive at any time, as long as you are able to make decisions  Review your plan every year, and when there are changes in your life, or your health  When you make changes, let your family, proxy, and healthcare providers know  Give each a new copy  Where can I find more information? · [employer]  [address] , [place] [address]  Phone: 5- 831 - [phone]  Phone: 4- 405 - [phone]  Web Address: http://www  aafp org  · [address] [facility])  [address] [address] [facility], [address] [facility] , [address] [place] [address]  Phone: 6- [phone]  Phone: [phone]  Web Address: Renate rangel  CARE AGREEMENT:   You have the right to help plan your care  To help with this plan, you must learn about your health condition and treatment options  You must also learn about advance directives and how they are used  Work with your healthcare providers to decide what care will be used to treat you  You always have the right to refuse treatment  The above information is an  only  It is not intended as medical advice for individual conditions or treatments  Talk to your doctor, nurse or pharmacist before following any medical regimen to see if it is safe and effective for you  © 2017 [address]  Information is for End User's use only and may not be sold, redistributed or otherwise used for commercial purposes  All illustrations and images included in CareNotes® are the copyrighted property of [employer] M [employer]  or Trino Zheng  Obesity   AMBULATORY CARE:   Obesity  is when your body mass index (BMI) is greater than 30  Your healthcare provider will use your height and weight to measure your BMI  The risks of obesity include  many health problems, such as injuries or physical disability   You may need tests to check for the following:  · Diabetes     · High blood pressure or high cholesterol     · Heart disease     · Gallbladder or liver disease     · Cancer of the colon, breast, prostate, liver, or kidney     · Sleep apnea     · Arthritis or gout  Seek care immediately if:   · You have a severe headache, confusion, or difficulty speaking  · You have weakness on one side of your body  · You have chest pain, sweating, or shortness of breath  Contact your healthcare provider if:   · You have symptoms of gallbladder or liver disease, such as pain in your upper abdomen  · You have knee or hip pain and discomfort while walking  · You have symptoms of diabetes, such as intense hunger and thirst, and frequent urination  · You have symptoms of sleep apnea, such as snoring or daytime sleepiness  · You have questions or concerns about your condition or care  Treatment for obesity  focuses on helping you lose weight to improve your health  Even a small decrease in BMI can reduce the risk for many health problems  Your healthcare provider will help you set a weight-loss goal   · Lifestyle changes  are the first step in treating obesity  These include making healthy food choices and getting regular physical activity  Your healthcare provider may suggest a weight-loss program that involves coaching, education, and therapy  · Medicine  may help you lose weight when it is used with a healthy diet and physical activity  · Surgery  can help you lose weight if you are very obese and have other health problems  There are several types of weight-loss surgery  Ask your healthcare provider for more information  Be successful losing weight:   · Set small, realistic goals  An example of a small goal is to walk for 20 minutes 5 days a week  Anther goal is to lose 5% of your body weight  · Tell friends, family members, and coworkers about your goals  and ask for their support   Ask a friend to lose weight with you, or join a weight-loss support group  · Identify foods or triggers that may cause you to overeat , and find ways to avoid them  Remove tempting high-calorie foods from your home and workplace  Place a bowl of fresh fruit on your kitchen counter  If stress causes you to eat, then find other ways to cope with stress  · Keep a diary to track what you eat and drink  Also write down how many minutes of physical activity you do each day  Weigh yourself once a week and record it in your diary  Eating changes: You will need to eat 500 to 1,000 fewer calories each day than you currently eat to lose 1 to 2 pounds a week  The following changes will help you cut calories:  · Eat smaller portions  Use small plates, no larger than 9 inches in diameter  Fill your plate half full of fruits and vegetables  Measure your food using measuring cups until you know what a serving size looks like  · Eat 3 meals and 1 or 2 snacks each day  Plan your meals in advance  Martha Miller and eat at home most of the time  Eat slowly  · Eat fruits and vegetables at every meal   They are low in calories and high in fiber, which makes you feel full  Do not add butter, margarine, or cream sauce to vegetables  Use herbs to season steamed vegetables  · Eat less fat and fewer fried foods  Eat more baked or grilled chicken and fish  These protein sources are lower in calories and fat than red meat  Limit fast food  Dress your salads with olive oil and vinegar instead of bottled dressing  · Limit the amount of sugar you eat  Do not drink sugary beverages  Limit alcohol  Activity changes:  Physical activity is good for your body in many ways  It helps you burn calories and build strong muscles  It decreases stress and depression, and improves your mood  It can also help you sleep better  Talk to your healthcare provider before you begin an exercise program   · Exercise for at least 30 minutes 5 days a week  Start slowly   Set aside time each day for physical activity that you enjoy and that is convenient for you  It is best to do both weight training and an activity that increases your heart rate, such as walking, bicycling, or swimming  · Find ways to be more active  Do yard work and housecleaning  Walk up the stairs instead of using elevators  Spend your leisure time going to events that require walking, such as outdoor festivals or fairs  This extra physical activity can help you lose weight and keep it off  Follow up with your healthcare provider as directed: You may need to meet with a dietitian  Write down your questions so you remember to ask them during your visits  © 2017 [address] Information is for End User's use only and may not be sold, redistributed or otherwise used for commercial purposes  All illustrations and images included in CareNotes® are the copyrighted property of [employer]  or Trino Zheng  The above information is an  only  It is not intended as medical advice for individual conditions or treatments  Talk to your doctor, nurse or pharmacist before following any medical regimen to see if it is safe and effective for you  Urinary Incontinence   WHAT YOU NEED TO KNOW:   What is urinary incontinence? Urinary incontinence (UI) is when you lose control of your bladder  What causes UI? UI occurs because your bladder cannot store or empty urine properly  The following are the most common types of UI:  · Stress incontinence  is when you leak urine due to increased bladder pressure  This may happen when you cough, sneeze, or exercise  · Urge incontinence  is when you feel the need to urinate right away and leak urine accidentally  · Mixed incontinence  is when you have both stress and urge UI  What are the signs and symptoms of UI?   · You feel like your bladder does not empty completely when you urinate  · You urinate often and need to urinate immediately      · You leak urine when you sleep, or you wake up with the urge to urinate  · You leak urine when you cough, sneeze, exercise, or laugh  How is UI diagnosed? Your healthcare provider will ask how often you leak urine and whether you have stress or urge symptoms  Tell him which medicines you take, how often you urinate, and how much liquid you drink each day  You may need any of the following tests:  · Urine tests  may show infection or kidney function  · A pelvic exam  may be done to check for blockages  A pelvic exam will also show if your bladder, uterus, or other organs have moved out of place  · An x-ray, ultrasound, or CT  may show problems with parts of your urinary system  You may be given contrast liquid to help your organs show up better in the pictures  Tell the healthcare provider if you have ever had an allergic reaction to contrast liquid  Do not enter the MRI room with anything metal  Metal can cause serious injury  Tell the healthcare provider if you have any metal in or on your body  · A bladder scan  will show how much urine is left in your bladder after you urinate  You will be asked to urinate and then healthcare providers will use a small ultrasound machine to check the urine left in your bladder  · Cystometry  is used to check the function of your urinary system  Your healthcare provider checks the pressure in your bladder while filling it with fluid  Your bladder pressure may also be tested when your bladder is full and while you urinate  How is UI treated? · Medicines  can help strengthen your bladder control  · Electrical stimulation  is used to send a small amount of electrical energy to your pelvic floor muscles  This helps control your bladder function  Electrodes may be placed outside your body or in your rectum  For women, the electrodes may be placed in the vagina  · A bulking agent  may be injected into the wall of your urethra to make it thicker   This helps keep your urethra closed and decreases urine leakage  · Devices  such as a clamp, pessary, or tampon may help stop urine leaks  Ask your healthcare provider for more information about these and other devices  · Surgery  may be needed if other treatments do not work  Several types of surgery can help improve your bladder control  Ask your healthcare provider for more information about the surgery you may need  How can I manage my symptoms? · Do pelvic muscle exercises often  Your pelvic muscles help you stop urinating  Squeeze these muscles tight for 5 seconds, then relax for 5 seconds  Gradually work up to squeezing for 10 seconds  Do 3 sets of 15 repetitions a day, or as directed  This will help strengthen your pelvic muscles and improve bladder control  · A catheter  may be used to help empty your bladder  A catheter is a tiny, plastic tube that is put into your bladder to drain your urine  Your healthcare provider may tell you to use a catheter to prevent your bladder from getting too full and leaking urine  · Keep a UI record  Write down how often you leak urine and how much you leak  Make a note of what you were doing when you leaked urine  · Train your bladder  Go to the bathroom at set times, such as every 2 hours, even if you do not feel the urge to go  You can also try to hold your urine when you feel the urge to go  For example, hold your urine for 5 minutes when you feel the urge to go  As that becomes easier, hold your urine for 10 minutes  · Drink liquids as directed  Ask your healthcare provider how much liquid to drink each day and which liquids are best for you  You may need to limit the amount of liquid you drink to help control your urine leakage  Limit or do not have drinks that contain caffeine or alcohol  Do not drink any liquid right before you go to bed  · Prevent constipation  Eat a variety of high-fiber foods   Good examples are high-fiber cereals, beans, vegetables, and whole-grain breads  Prune juice may help make your bowel movement softer  Walking is the best way to trigger your intestines to have a bowel movement  · Exercise regularly and maintain a healthy weight  Ask your healthcare provider how much you should weigh and about the best exercise plan for you  Weight loss and exercise will decrease pressure on your bladder and help you control your leakage  Ask him to help you create a weight loss plan if you are overweight  When should I seek immediate care? · You have severe pain  · You are confused or cannot think clearly  When should I contact my healthcare provider? · You have a fever  · You see blood in your urine  · You have pain when you urinate  · You have new or worse pain, even after treatment  · Your mouth feels dry or you have vision changes  · Your urine is cloudy or smells bad  · You have questions or concerns about your condition or care  CARE AGREEMENT:   You have the right to help plan your care  Learn about your health condition and how it may be treated  Discuss treatment options with your caregivers to decide what care you want to receive  You always have the right to refuse treatment  The above information is an  only  It is not intended as medical advice for individual conditions or treatments  Talk to your doctor, nurse or pharmacist before following any medical regimen to see if it is safe and effective for you  © 2017 [address] [employer]  Information is for End User's use only and may not be sold, redistributed or otherwise used for commercial purposes  All illustrations and images included in CareNotes® are the copyrighted property of [employer]  or Trino Norm  Cigarette Smoking and Your Health   AMBULATORY CARE:   Risks to your health if you smoke:  Nicotine and other chemicals found in tobacco damage every cell in your body   Even if you are a light smoker, you have an increased risk for cancer, heart disease, and lung disease  If you are pregnant or have diabetes, smoking increases your risk for complications  Benefits to your health if you stop smoking:   · You decrease respiratory symptoms such as coughing, wheezing, and shortness of breath  · You reduce your risk for cancers of the lung, mouth, throat, kidney, bladder, pancreas, stomach, and cervix  If you already have cancer, you increase the benefits of chemotherapy  You also reduce your risk for cancer returning or a second cancer from developing  · You reduce your risk for heart disease, blood clots, heart attack, and stroke  · You reduce your risk for lung infections, and diseases such as pneumonia, asthma, chronic bronchitis, and emphysema  · Your circulation improves  More oxygen can be delivered to your body  If you have diabetes, you lower your risk for complications, such as kidney, artery, and eye diseases  You also lower your risk for nerve damage  Nerve damage can lead to amputations, poor vision, and blindness  · You improve your body's ability to heal and to fight infections  Benefits to the health of others if you stop smoking:  Tobacco is harmful to nonsmokers who breathe in your secondhand smoke  The following are ways the health of others around you may improve when you stop smoking:  · You lower the risks for lung cancer and heart disease in nonsmoking adults  · If you are pregnant, you lower the risk for miscarriage, early delivery, low birth weight, and stillbirth  You also lower your baby's risk for SIDS, obesity, developmental delay, and neurobehavioral problems, such as ADHD  · If you have children, you lower their risk for ear infections, colds, pneumonia, bronchitis, and asthma  For more information and support to stop smoking:   · [employer]  Phone: 1- 886 - 880-9183  Web Address: www [employer]  Follow up with your healthcare provider as directed: Write down your questions so you remember to ask them during your visits  © 2017 [address] Information is for End User's use only and may not be sold, redistributed or otherwise used for commercial purposes  All illustrations and images included in CareNotes® are the copyrighted property of [employer]  or Trino Zheng  The above information is an  only  It is not intended as medical advice for individual conditions or treatments  Talk to your doctor, nurse or pharmacist before following any medical regimen to see if it is safe and effective for you  Fall Prevention   AMBULATORY CARE:   Fall prevention  includes ways to make your home and other areas safer  It also includes ways you can move more carefully to prevent a fall  Health conditions that cause changes in your blood pressure, vision, or muscle strength and coordination may increase your risk for falls  Medicines may also increase your risk for falls if they make you dizzy, weak, or sleepy  Call 911 or have someone else call if:   · You have fallen and are unconscious  · You have fallen and cannot move part of your body  Contact your healthcare provider if:   · You have fallen and have pain or a headache  · You have questions or concerns about your condition or care  Fall prevention tips:   · Stand or sit up slowly  This may help you keep your balance and prevent falls  · Use assistive devices as directed  Your healthcare provider may suggest that you use a cane or walker to help you keep your balance  You may need to have grab bars put in your bathroom near the toilet or in the shower  · Wear shoes that fit well and have soles that   Wear shoes both inside and outside  Use slippers with good   Do not wear shoes with high heels  · Wear a personal alarm  This is a device that allows you to call 911 if you fall and need help   Ask your healthcare provider for more information  · Stay active  Exercise can help strengthen your muscles and improve your balance  Your healthcare provider may recommend water aerobics or walking  He or she may also recommend physical therapy to improve your coordination  Never start an exercise program without talking to your healthcare provider first      · Manage your medical conditions  Keep all appointments with your healthcare providers  Visit your eye doctor as directed  Home safety tips:   · Add items to prevent falls in the bathroom  Put nonslip strips on your bath or shower floor to prevent you from slipping  Use a bath mat if you do not have carpet in the bathroom  This will prevent you from falling when you step out of the bath or shower  Use a shower seat so you do not need to stand while you shower  Sit on the toilet or a chair in your bathroom to dry yourself and put on clothing  This will prevent you from losing your balance from drying or dressing yourself while you are standing  · Keep paths clear  Remove books, shoes, and other objects from walkways and stairs  Place cords for telephones and lamps out of the way so that you do not need to walk over them  Tape them down if you cannot move them  Remove small rugs  If you cannot remove a rug, secure it with double-sided tape  This will prevent you from tripping  · Install bright lights in your home  Use night lights to help light paths to the bathroom or kitchen  Always turn on the light before you start walking  · Keep items you use often on shelves within reach  Do not use a step stool to help you reach an item  · Paint or place reflective tape on the edges of your stairs  This will help you see the stairs better  Follow up with your healthcare provider as directed:  Write down your questions so you remember to ask them during your visits     © 2017 [employer] Information is for End User's use only and may not be sold, redistributed or otherwise used for commercial purposes  All illustrations and images included in CareNotes® are the copyrighted property of [employer]  or Trino Zheng  The above information is an  only  It is not intended as medical advice for individual conditions or treatments  Talk to your doctor, nurse or pharmacist before following any medical regimen to see if it is safe and effective for you  Advance Directives   WHAT YOU NEED TO KNOW:   What are advance directives? Advance directives are legal documents that state your wishes and plans for medical care  These plans are made ahead of time in case you lose your ability to make decisions for yourself  Advance directives can apply to any medical decision, such as the treatments you want, and if you want to donate organs  What are the types of advance directives? There are many types of advance directives, and each state has rules about how to use them  You may choose a combination of any of the following:  · Living will: This is a written record of the treatment you want  You can also choose which treatments you do not want, which to limit, and which to stop at a certain time  This includes surgery, medicine, IV fluid, and tube feedings  · Durable power of  for healthcare [facility] [facility]): This is a written record that states who you want to make healthcare choices for you when you are unable to make them for yourself  This person, called a proxy, is usually a family member or a friend  You may choose more than 1 proxy  · Do not resuscitate (DNR) order:  A DNR order is used in case your heart stops beating or you stop breathing  It is a request not to have certain forms of treatment, such as CPR  A DNR order may be included in other types of advance directives  · Medical directive: This covers the care that you want if you are in a coma, near death, or unable to make decisions for yourself   You can list the treatments you want for each condition  Treatment may include pain medicine, surgery, blood transfusions, dialysis, IV or tube feedings, and a ventilator (breathing machine)  · Values history: This document has questions about your views, beliefs, and how you feel and think about life  This information can help others choose the care that you would choose  Why are advance directives important? An advance directive helps you control your care  Although spoken wishes may be used, it is better to have your wishes written down  Spoken wishes can be misunderstood, or not followed  Treatments may be given even if you do not want them  An advance directive may make it easier for your family to make difficult choices about your care  How do I decide what to put in my advance directives? · Make informed decisions:  Make sure you fully understand treatments or care you may receive  Think about the benefits and problems your decisions could cause for you or your family  Talk to healthcare providers if you have concerns or questions before you write down your wishes  You may also want to talk with your Denominational or , or a   Check your state laws to make sure that what you put in your advance directive is legal      · Sign all forms:  Sign and date your advance directive when you have finished  You may also need 2 witnesses to sign the forms  Witnesses cannot be your doctor or his staff, your spouse, heirs or beneficiaries, people you owe money to, or your chosen proxy  Talk to your family, proxy, and healthcare providers about your advance directive  Give each person a copy, and keep one for yourself in a place you can get to easily  Do not keep it hidden or locked away  · Review and revise your plans: You can revise your advance directive at any time, as long as you are able to make decisions  Review your plan every year, and when there are changes in your life, or your health   When you make changes, let your family, proxy, and healthcare providers know  Give each a new copy  Where can I find more information? · [employer]  [address] , [place] [address]  Phone: 4- [phone] - [phone]  Phone: 3- 296 - [phone]  Web Address: http://www  aafp org  · [address] [facility])  [address], [address]  Phone: [phone]  Phone: [phone]  Web Address: Renate rangel  CARE AGREEMENT:   You have the right to help plan your care  To help with this plan, you must learn about your health condition and treatment options  You must also learn about advance directives and how they are used  Work with your healthcare providers to decide what care will be used to treat you  You always have the right to refuse treatment  The above information is an  only  It is not intended as medical advice for individual conditions or treatments  Talk to your doctor, nurse or pharmacist before following any medical regimen to see if it is safe and effective for you  © 2017 [address] [facility] Information is for End User's use only and may not be sold, redistributed or otherwise used for commercial purposes  All illustrations and images included in CareNotes® are the copyrighted property of [employer]  or Trino Zheng

## 2019-07-10 NOTE — PROGRESS NOTES
Arlyn Schirmer is here for her Initial Wellness visit  Health Risk Assessment:  Patient rates overall health as fair  Patient feels that their physical health rating is Much better  Eyesight was rated as Slightly worse  Hearing was rated as Same  Patient feels that their emotional and mental health rating is Same  Pain experienced by patient in the last 7 days has been None  Patient states that she has experienced no weight loss or gain in last 6 months  Emotional/Mental Health:  Patient has been feeling nervous/anxious  PHQ-9 Depression Screening:    Frequency of the following problems over the past two weeks:      1  Little interest or pleasure in doing things: 1 - several days      2  Feeling down, depressed, or hopeless: 1 - several days  PHQ-2 Score: 2          Broken Bones/Falls: Fall Risk Assessment:    In the past year, patient has experienced: History of falling in past year    Number of falls: 1    Injured during fall: No      Patient feels steady when standing or walking  Patient is not worried about falling  Bladder/Bowel:  Patient has not leaked urine accidently in the last six months  Patient reports no loss of bowel control  Immunizations:  Patient has had a flu vaccination within the last year  Patient has not received a pneumonia shot  Patient has not received a shingles shot  Patient has received tetanus/diphtheria shot  Date of tetanus/diphtheria shot: 7/23/2018    Home Safety:  Patient has trouble with stairs inside or outside of their home  Patient currently reports that there are safety hazards present in home , working smoke alarms, working carbon monoxide detectors        Preventative Screenings:   No breast cancer screening performed, no colon cancer screen completed, no cholesterol screen completed, glaucoma eye exam completed, 2/28/2019      Nutrition:  Current diet: Regular with servings of the following:    Medications:  Patient is not currently taking any over-the-counter supplements  Patient is able to manage medications  Lifestyle Choices:  Patient reports no tobacco use  Patient has smoked or used tobacco in the past   Patient has stopped her tobacco use  Tobacco use quit date: 01/02/2018  Patient reports no alcohol use  Patient drives a vehicle  Patient wears seat belt  Current level of exercise of physical activity described by patient as: none  Activities of Daily Living:  Can get out of bed by his or her self, able to dress self, able to make own meals, able to do own shopping, able to bathe self, can do own laundry/housekeeping, can manage own money, pay bills and track expenses    Previous Hospitalizations:  Hospitalization or ED visit in past 12 months  Number of hospitalizations within the last year: 3-4  Additional Comments: pschycological    Advanced Directives:  Patient has not decided on power of   Patient has not completed advanced directive          Preventative Screening/Counseling:      Cardiovascular:      General: Risks and Benefits Discussed and Screening Current      Counseling: Healthy Diet, Healthy Weight and Improve Exercise Tolerance          Diabetes:      General: Risks and Benefits Discussed and Screening Current      Counseling: Healthy Diet, Healthy Weight and Improve Physical Activity          Colorectal Cancer:      General: Screening Not Indicated          Breast Cancer:      General: Risks and Benefits Discussed and Screening Current      Comments: 8/18/2018        Cervical Cancer:      General: Risks and Benefits Discussed and Screening Current      Comments: Due last month   Will schedule        Osteoporosis:      General: Screening Not Indicated          AAA:      General: Screening Not Indicated          Glaucoma:      General: Risks and Benefits Discussed and Screening Current          HIV:      General: Risks and Benefits Discussed and Patient Declines          Hepatitis C:      General: Risks and Benefits Discussed      Counseling: has received general HCV counseling        Advanced Directives:   Patient has no living will for healthcare, does not have durable POA for healthcare, patient does not have an advanced directive  Information on ACP and/or AD provided  5 wishes given  Assessment/Plan:    No problem-specific Assessment & Plan notes found for this encounter  Diagnoses and all orders for this visit:    Medicare annual wellness visit, initial  -     Lipid panel  -     Hepatitis C antibody; Future    Benign essential hypertension  -     Lipid panel    Encounter for hepatitis C screening test for low risk patient  -     Hepatitis C antibody; Future    Other orders  -     Discontinue: chlorproMAZINE (THORAZINE) 50 mg tablet; Take 50 mg by mouth 3 (three) times a day as needed  -     potassium chloride (K-DUR,KLOR-CON) 20 mEq tablet; TAKE 1 TABLET (20 MEQ TOTAL) BY MOUTH DAILY FOR 30 DAYS  -     topiramate (TOPAMAX) 100 mg tablet; Take 100 mg by mouth daily   -     topiramate (TOPAMAX) 50 MG tablet; one time daily          Subjective:      Patient ID: Alisa Morales is a 53 y o  female  HPI  Medicare wellness  Slight tremor since hospital  Thinks may be meds  Hasn't cut in 6 weeks  Seeing psychaitry    Having some moodiness   Talking to people at work    Trying to be more social     The following portions of the patient's history were reviewed and updated as appropriate: allergies, current medications, past family history, past medical history, past social history, past surgical history and problem list     Review of Systems   Constitutional: Negative for activity change, appetite change, fatigue, fever and unexpected weight change  HENT: Negative for congestion, dental problem and sneezing  Eyes: Negative for discharge and visual disturbance  Respiratory: Negative for cough and wheezing      Gastrointestinal: Negative for abdominal pain, bowel incontinence, constipation, diarrhea, nausea and vomiting  Endocrine: Negative for polydipsia and polyuria  Genitourinary: Negative for dysuria and frequency  Musculoskeletal: Negative for arthralgias  Skin: Negative for rash  Allergic/Immunologic: Negative for environmental allergies and food allergies  Neurological: Positive for tremors  Negative for headaches  Mild tremors since hospital   Thinks may be meds  Hematological: Negative for adenopathy  Psychiatric/Behavioral: Negative for behavioral problems and sleep disturbance  The patient is nervous/anxious  Seeing psychiatry     Doing ok but states a little garcia  Is working   [employer] increased talking  Objective:      /80 (BP Location: Left arm, Patient Position: Sitting, Cuff Size: Large)   Pulse 56   Temp (!) 96 4 °F (35 8 °C) (Temporal)   Ht 5' 5 35" (1 66 m)   Wt 121 kg (265 lb 12 8 oz)   BMI 43 75 kg/m²          Physical Exam   Constitutional: She is oriented to person, place, and time  She appears well-developed and well-nourished  HENT:   Right Ear: External ear normal    Left Ear: External ear normal    Nose: Nose normal    Mouth/Throat: Oropharynx is clear and moist    Eyes: Conjunctivae and EOM are normal    Neck: Neck supple  No thyromegaly present  Cardiovascular: Normal rate, regular rhythm and normal heart sounds  Pulmonary/Chest: Effort normal and breath sounds normal    Abdominal: Bowel sounds are normal    Musculoskeletal: Normal range of motion  Lymphadenopathy:     She has no cervical adenopathy  Neurological: She is alert and oriented to person, place, and time  Skin:        Vitals reviewed  Patient Instructions       Obesity   AMBULATORY CARE:   Obesity  is when your body mass index (BMI) is greater than 30  Your healthcare provider will use your height and weight to measure your BMI  The risks of obesity include  many health problems, such as injuries or physical disability   You may need tests to check for the following:  · Diabetes     · High blood pressure or high cholesterol     · Heart disease     · Gallbladder or liver disease     · Cancer of the colon, breast, prostate, liver, or kidney     · Sleep apnea     · Arthritis or gout  Seek care immediately if:   · You have a severe headache, confusion, or difficulty speaking  · You have weakness on one side of your body  · You have chest pain, sweating, or shortness of breath  Contact your healthcare provider if:   · You have symptoms of gallbladder or liver disease, such as pain in your upper abdomen  · You have knee or hip pain and discomfort while walking  · You have symptoms of diabetes, such as intense hunger and thirst, and frequent urination  · You have symptoms of sleep apnea, such as snoring or daytime sleepiness  · You have questions or concerns about your condition or care  Treatment for obesity  focuses on helping you lose weight to improve your health  Even a small decrease in BMI can reduce the risk for many health problems  Your healthcare provider will help you set a weight-loss goal   · Lifestyle changes  are the first step in treating obesity  These include making healthy food choices and getting regular physical activity  Your healthcare provider may suggest a weight-loss program that involves coaching, education, and therapy  · Medicine  may help you lose weight when it is used with a healthy diet and physical activity  · Surgery  can help you lose weight if you are very obese and have other health problems  There are several types of weight-loss surgery  Ask your healthcare provider for more information  Be successful losing weight:   · Set small, realistic goals  An example of a small goal is to walk for 20 minutes 5 days a week  [place] goal is to lose 5% of your body weight  · Tell friends, family members, and coworkers about your goals  and ask for their support   Ask a friend to lose weight with you, or join a weight-loss support group  · Identify foods or triggers that may cause you to overeat , and find ways to avoid them  Remove tempting high-calorie foods from your home and workplace  Place a bowl of fresh fruit on your kitchen counter  If stress causes you to eat, then find other ways to cope with stress  · Keep a diary to track what you eat and drink  Also write down how many minutes of physical activity you do each day  Weigh yourself once a week and record it in your diary  Eating changes: You will need to eat 500 to 1,000 fewer calories each day than you currently eat to lose 1 to 2 pounds a week  The following changes will help you cut calories:  · Eat smaller portions  Use small plates, no larger than 9 inches in diameter  Fill your plate half full of fruits and vegetables  Measure your food using measuring cups until you know what a serving size looks like  · Eat 3 meals and 1 or 2 snacks each day  Plan your meals in advance  Giovanna Stalls and eat at home most of the time  Eat slowly  · Eat fruits and vegetables at every meal   They are low in calories and high in fiber, which makes you feel full  Do not add butter, margarine, or cream sauce to vegetables  Use herbs to season steamed vegetables  · Eat less fat and fewer fried foods  Eat more baked or grilled chicken and fish  These protein sources are lower in calories and fat than red meat  Limit fast food  Dress your salads with olive oil and vinegar instead of bottled dressing  · Limit the amount of sugar you eat  Do not drink sugary beverages  Limit alcohol  Activity changes:  Physical activity is good for your body in many ways  It helps you burn calories and build strong muscles  It decreases stress and depression, and improves your mood  It can also help you sleep better  Talk to your healthcare provider before you begin an exercise program   · Exercise for at least 30 minutes 5 days a week  Start slowly   Set aside time each day for physical activity that you enjoy and that is convenient for you  It is best to do both weight training and an activity that increases your heart rate, such as walking, bicycling, or swimming  · Find ways to be more active  Do yard work and housecleaning  Walk up the stairs instead of using elevators  Spend your leisure time going to events that require walking, such as outdoor festivals or fairs  This extra physical activity can help you lose weight and keep it off  Follow up with your healthcare provider as directed: You may need to meet with a dietitian  Write down your questions so you remember to ask them during your visits  © 2017 [address] Information is for End User's use only and may not be sold, redistributed or otherwise used for commercial purposes  All illustrations and images included in CareNotes® are the copyrighted property of [employer]  or [employer]ino [employer]  The above information is an  only  It is not intended as medical advice for individual conditions or treatments  Talk to your doctor, nurse or pharmacist before following any medical regimen to see if it is safe and effective for you  Urinary Incontinence   WHAT YOU NEED TO KNOW:   What is urinary incontinence? Urinary incontinence (UI) is when you lose control of your bladder  What causes UI? UI occurs because your bladder cannot store or empty urine properly  The following are the most common types of UI:  · Stress incontinence  is when you leak urine due to increased bladder pressure  This may happen when you cough, sneeze, or exercise  · Urge incontinence  is when you feel the need to urinate right away and leak urine accidentally  · Mixed incontinence  is when you have both stress and urge UI  What are the signs and symptoms of UI?   · You feel like your bladder does not empty completely when you urinate      · You urinate often and need to urinate immediately  · You leak urine when you sleep, or you wake up with the urge to urinate  · You leak urine when you cough, sneeze, exercise, or laugh  How is UI diagnosed? Your healthcare provider will ask how often you leak urine and whether you have stress or urge symptoms  Tell him which medicines you take, how often you urinate, and how much liquid you drink each day  You may need any of the following tests:  · Urine tests  may show infection or kidney function  · A pelvic exam  may be done to check for blockages  A pelvic exam will also show if your bladder, uterus, or other organs have moved out of place  · An x-ray, ultrasound, or CT  may show problems with parts of your urinary system  You may be given contrast liquid to help your organs show up better in the pictures  Tell the healthcare provider if you have ever had an allergic reaction to contrast liquid  Do not enter the MRI room with anything metal  Metal can cause serious injury  Tell the healthcare provider if you have any metal in or on your body  · A bladder scan  will show how much urine is left in your bladder after you urinate  You will be asked to urinate and then healthcare providers will use a small ultrasound machine to check the urine left in your bladder  · Cystometry  is used to check the function of your urinary system  Your healthcare provider checks the pressure in your bladder while filling it with fluid  Your bladder pressure may also be tested when your bladder is full and while you urinate  How is UI treated? · Medicines  can help strengthen your bladder control  · Electrical stimulation  is used to send a small amount of electrical energy to your pelvic floor muscles  This helps control your bladder function  Electrodes may be placed outside your body or in your rectum  For women, the electrodes may be placed in the vagina       · A bulking agent  may be injected into the wall of your urethra to make it thicker  This helps keep your urethra closed and decreases urine leakage  · Devices  such as a clamp, pessary, or tampon may help stop urine leaks  Ask your healthcare provider for more information about these and other devices  · Surgery  may be needed if other treatments do not work  Several types of surgery can help improve your bladder control  Ask your healthcare provider for more information about the surgery you may need  How can I manage my symptoms? · Do pelvic muscle exercises often  Your pelvic muscles help you stop urinating  Squeeze these muscles tight for 5 seconds, then relax for 5 seconds  Gradually work up to squeezing for 10 seconds  Do 3 sets of 15 repetitions a day, or as directed  This will help strengthen your pelvic muscles and improve bladder control  · A catheter  may be used to help empty your bladder  A catheter is a tiny, plastic tube that is put into your bladder to drain your urine  Your healthcare provider may tell you to use a catheter to prevent your bladder from getting too full and leaking urine  · Keep a UI record  Write down how often you leak urine and how much you leak  Make a note of what you were doing when you leaked urine  · Train your bladder  Go to the bathroom at set times, such as every 2 hours, even if you do not feel the urge to go  You can also try to hold your urine when you feel the urge to go  For example, hold your urine for 5 minutes when you feel the urge to go  As that becomes easier, hold your urine for 10 minutes  · Drink liquids as directed  Ask your healthcare provider how much liquid to drink each day and which liquids are best for you  You may need to limit the amount of liquid you drink to help control your urine leakage  Limit or do not have drinks that contain caffeine or alcohol  Do not drink any liquid right before you go to bed  · Prevent constipation  Eat a variety of high-fiber foods   Good examples are high-fiber cereals, beans, vegetables, and whole-grain breads  Prune juice may help make your bowel movement softer  Walking is the best way to trigger your intestines to have a bowel movement  · Exercise regularly and maintain a healthy weight  Ask your healthcare provider how much you should weigh and about the best exercise plan for you  Weight loss and exercise will decrease pressure on your bladder and help you control your leakage  Ask him to help you create a weight loss plan if you are overweight  When should I seek immediate care? · You have severe pain  · You are confused or cannot think clearly  When should I contact my healthcare provider? · You have a fever  · You see blood in your urine  · You have pain when you urinate  · You have new or worse pain, even after treatment  · Your mouth feels dry or you have vision changes  · Your urine is cloudy or smells bad  · You have questions or concerns about your condition or care  CARE AGREEMENT:   You have the right to help plan your care  Learn about your health condition and how it may be treated  Discuss treatment options with your caregivers to decide what care you want to receive  You always have the right to refuse treatment  The above information is an  only  It is not intended as medical advice for individual conditions or treatments  Talk to your doctor, nurse or pharmacist before following any medical regimen to see if it is safe and effective for you  © 2017 [address] Information is for End User's use only and may not be sold, redistributed or otherwise used for commercial purposes  All illustrations and images included in CareNotes® are the copyrighted property of [employer]  or [employer]  Cigarette Smoking and Your Health   AMBULATORY CARE:   Risks to your health if you smoke:  Nicotine and other chemicals found in tobacco damage every cell in your body   Even if you are a light smoker, you have an increased risk for cancer, heart disease, and lung disease  If you are pregnant or have diabetes, smoking increases your risk for complications  Benefits to your health if you stop smoking:   · You decrease respiratory symptoms such as coughing, wheezing, and shortness of breath  · You reduce your risk for cancers of the lung, mouth, throat, kidney, bladder, pancreas, stomach, and cervix  If you already have cancer, you increase the benefits of chemotherapy  You also reduce your risk for cancer returning or a second cancer from developing  · You reduce your risk for heart disease, blood clots, heart attack, and stroke  · You reduce your risk for lung infections, and diseases such as pneumonia, asthma, chronic bronchitis, and emphysema  · Your circulation improves  More oxygen can be delivered to your body  If you have diabetes, you lower your risk for complications, such as kidney, artery, and eye diseases  You also lower your risk for nerve damage  Nerve damage can lead to amputations, poor vision, and blindness  · You improve your body's ability to heal and to fight infections  Benefits to the health of others if you stop smoking:  Tobacco is harmful to nonsmokers who breathe in your secondhand smoke  The following are ways the health of others around you may improve when you stop smoking:  · You lower the risks for lung cancer and heart disease in nonsmoking adults  · If you are pregnant, you lower the risk for miscarriage, early delivery, low birth weight, and stillbirth  You also lower your baby's risk for SIDS, obesity, developmental delay, and neurobehavioral problems, such as ADHD  · If you have children, you lower their risk for ear infections, colds, pneumonia, bronchitis, and asthma  For more information and support to stop smoking:   · [facility]  Phone: 6- 613 - 041-4126  Web Address: www [employer]  Follow up with your healthcare provider as directed:  Write down your questions so you remember to ask them during your visits  © 2017 [address] Information is for End User's use only and may not be sold, redistributed or otherwise used for commercial purposes  All illustrations and images included in CareNotes® are the copyrighted property of [employer]  or [employer]ino [employer]heng  The above information is an  only  It is not intended as medical advice for individual conditions or treatments  Talk to your doctor, nurse or pharmacist before following any medical regimen to see if it is safe and effective for you  Fall Prevention   AMBULATORY CARE:   Fall prevention  includes ways to make your home and other areas safer  It also includes ways you can move more carefully to prevent a fall  Health conditions that cause changes in your blood pressure, vision, or muscle strength and coordination may increase your risk for falls  Medicines may also increase your risk for falls if they make you dizzy, weak, or sleepy  Call 911 or have someone else call if:   · You have fallen and are unconscious  · You have fallen and cannot move part of your body  Contact your healthcare provider if:   · You have fallen and have pain or a headache  · You have questions or concerns about your condition or care  Fall prevention tips:   · Stand or sit up slowly  This may help you keep your balance and prevent falls  · Use assistive devices as directed  Your healthcare provider may suggest that you use a cane or walker to help you keep your balance  You may need to have grab bars put in your bathroom near the toilet or in the shower  · Wear shoes that fit well and have soles that   Wear shoes both inside and outside  Use slippers with good   Do not wear shoes with high heels  · Wear a personal alarm  This is a device that allows you to call 911 if you fall and need help   Ask your healthcare provider for more information  · Stay active  Exercise can help strengthen your muscles and improve your balance  Your healthcare provider may recommend water aerobics or walking  He or she may also recommend physical therapy to improve your coordination  Never start an exercise program without talking to your healthcare provider first      · Manage your medical conditions  Keep all appointments with your healthcare providers  Visit your eye doctor as directed  Home safety tips:   · Add items to prevent falls in the bathroom  Put nonslip strips on your bath or shower floor to prevent you from slipping  Use a bath mat if you do not have carpet in the bathroom  This will prevent you from falling when you step out of the bath or shower  Use a shower seat so you do not need to stand while you shower  Sit on the toilet or a chair in your bathroom to dry yourself and put on clothing  This will prevent you from losing your balance from drying or dressing yourself while you are standing  · Keep paths clear  Remove books, shoes, and other objects from walkways and stairs  Place cords for telephones and lamps out of the way so that you do not need to walk over them  Tape them down if you cannot move them  Remove small rugs  If you cannot remove a rug, secure it with double-sided tape  This will prevent you from tripping  · Install bright lights in your home  Use night lights to help light paths to the bathroom or kitchen  Always turn on the light before you start walking  · Keep items you use often on shelves within reach  Do not use a step stool to help you reach an item  · Paint or place reflective tape on the edges of your stairs  This will help you see the stairs better  Follow up with your healthcare provider as directed:  Write down your questions so you remember to ask them during your visits     © 2017 [employer] Information is for End User's use only and may not be sold, redistributed or otherwise used for commercial purposes  All illustrations and images included in CareNotes® are the copyrighted property of A D A M , Inc  or Trino Zheng  The above information is an  only  It is not intended as medical advice for individual conditions or treatments  Talk to your doctor, nurse or pharmacist before following any medical regimen to see if it is safe and effective for you  Advance Directives   WHAT YOU NEED TO KNOW:   What are advance directives? Advance directives are legal documents that state your wishes and plans for medical care  These plans are made ahead of time in case you lose your ability to make decisions for yourself  Advance directives can apply to any medical decision, such as the treatments you want, and if you want to donate organs  What are the types of advance directives? There are many types of advance directives, and each state has rules about how to use them  You may choose a combination of any of the following:  · Living will: This is a written record of the treatment you want  You can also choose which treatments you do not want, which to limit, and which to stop at a certain time  This includes surgery, medicine, IV fluid, and tube feedings  · Durable power of  for healthcare [place] [facility]): This is a written record that states who you want to make healthcare choices for you when you are unable to make them for yourself  This person, called a proxy, is usually a family member or a friend  You may choose more than 1 proxy  · Do not resuscitate (DNR) order:  A DNR order is used in case your heart stops beating or you stop breathing  It is a request not to have certain forms of treatment, such as CPR  A DNR order may be included in other types of advance directives  · Medical directive: This covers the care that you want if you are in a coma, near death, or unable to make decisions for yourself   You can list the treatments you want for each condition  Treatment may include pain medicine, surgery, blood transfusions, dialysis, IV or tube feedings, and a ventilator (breathing machine)  · Values history: This document has questions about your views, beliefs, and how you feel and think about life  This information can help others choose the care that you would choose  Why are advance directives important? An advance directive helps you control your care  Although spoken wishes may be used, it is better to have your wishes written down  Spoken wishes can be misunderstood, or not followed  Treatments may be given even if you do not want them  An advance directive may make it easier for your family to make difficult choices about your care  How do I decide what to put in my advance directives? · Make informed decisions:  Make sure you fully understand treatments or care you may receive  Think about the benefits and problems your decisions could cause for you or your family  Talk to healthcare providers if you have concerns or questions before you write down your wishes  You may also want to talk with your Y[employer] or , or a   Check your state laws to make sure that what you put in your advance directive is legal      · Sign all forms:  Sign and date your advance directive when you have finished  You may also need 2 witnesses to sign the forms  Witnesses cannot be your doctor or his staff, your spouse, heirs or beneficiaries, people you owe money to, or your chosen proxy  Talk to your family, proxy, and healthcare providers about your advance directive  Give each person a copy, and keep one for yourself in a place you can get to easily  Do not keep it hidden or locked away  · Review and revise your plans: You can revise your advance directive at any time, as long as you are able to make decisions  Review your plan every year, and when there are changes in your life, or your health   When you make changes, let your family, proxy, and healthcare providers know  Give each a new copy  Where can I find more information? · [employer]  [address] , [place] [address]  Phone: [phone]  Phone: [phone]  Web Address: http://www  aafp org  · [address] [facility])  [address], [address]  Phone: [phone]  Phone: [phone]  Web Address: DeedianaSixtoapple claire  CARE AGREEMENT:   You have the right to help plan your care  To help with this plan, you must learn about your health condition and treatment options  You must also learn about advance directives and how they are used  Work with your healthcare providers to decide what care will be used to treat you  You always have the right to refuse treatment  The above information is an  only  It is not intended as medical advice for individual conditions or treatments  Talk to your doctor, nurse or pharmacist before following any medical regimen to see if it is safe and effective for you  © 2017 [address]  Information is for End User's use only and may not be sold, redistributed or otherwise used for commercial purposes  All illustrations and images included in CareNotes® are the copyrighted property of [employer]  or Trino Zheng  Obesity   AMBULATORY CARE:   Obesity  is when your body mass index (BMI) is greater than 30  Your healthcare provider will use your height and weight to measure your BMI  The risks of obesity include  many health problems, such as injuries or physical disability   You may need tests to check for the following:  · Diabetes     · High blood pressure or high cholesterol     · Heart disease     · Gallbladder or liver disease     · Cancer of the colon, breast, prostate, liver, or kidney     · Sleep apnea     · Arthritis or gout  Seek care immediately if: · You have a severe headache, confusion, or difficulty speaking  · You have weakness on one side of your body  · You have chest pain, sweating, or shortness of breath  Contact your healthcare provider if:   · You have symptoms of gallbladder or liver disease, such as pain in your upper abdomen  · You have knee or hip pain and discomfort while walking  · You have symptoms of diabetes, such as intense hunger and thirst, and frequent urination  · You have symptoms of sleep apnea, such as snoring or daytime sleepiness  · You have questions or concerns about your condition or care  Treatment for obesity  focuses on helping you lose weight to improve your health  Even a small decrease in BMI can reduce the risk for many health problems  Your healthcare provider will help you set a weight-loss goal   · Lifestyle changes  are the first step in treating obesity  These include making healthy food choices and getting regular physical activity  Your healthcare provider may suggest a weight-loss program that involves coaching, education, and therapy  · Medicine  may help you lose weight when it is used with a healthy diet and physical activity  · Surgery  can help you lose weight if you are very obese and have other health problems  There are several types of weight-loss surgery  Ask your healthcare provider for more information  Be successful losing weight:   · Set small, realistic goals  An example of a small goal is to walk for 20 minutes 5 days a week  Anther goal is to lose 5% of your body weight  · Tell friends, family members, and coworkers about your goals  and ask for their support  Ask a friend to lose weight with you, or join a weight-loss support group  · Identify foods or triggers that may cause you to overeat , and find ways to avoid them  Remove tempting high-calorie foods from your home and workplace  Place a bowl of fresh fruit on your kitchen counter   If stress causes you to eat, then find other ways to cope with stress  · Keep a diary to track what you eat and drink  Also write down how many minutes of physical activity you do each day  Weigh yourself once a week and record it in your diary  Eating changes: You will need to eat 500 to 1,000 fewer calories each day than you currently eat to lose 1 to 2 pounds a week  The following changes will help you cut calories:  · Eat smaller portions  Use small plates, no larger than 9 inches in diameter  Fill your plate half full of fruits and vegetables  Measure your food using measuring cups until you know what a serving size looks like  · Eat 3 meals and 1 or 2 snacks each day  Plan your meals in advance  Giovanna Stalls and eat at home most of the time  Eat slowly  · Eat fruits and vegetables at every meal   They are low in calories and high in fiber, which makes you feel full  Do not add butter, margarine, or cream sauce to vegetables  Use herbs to season steamed vegetables  · Eat less fat and fewer fried foods  Eat more baked or grilled chicken and fish  These protein sources are lower in calories and fat than red meat  Limit fast food  Dress your salads with olive oil and vinegar instead of bottled dressing  · Limit the amount of sugar you eat  Do not drink sugary beverages  Limit alcohol  Activity changes:  Physical activity is good for your body in many ways  It helps you burn calories and build strong muscles  It decreases stress and depression, and improves your mood  It can also help you sleep better  Talk to your healthcare provider before you begin an exercise program   · Exercise for at least 30 minutes 5 days a week  Start slowly  Set aside time each day for physical activity that you enjoy and that is convenient for you  It is best to do both weight training and an activity that increases your heart rate, such as walking, bicycling, or swimming  · Find ways to be more active    Do yard work and housecleaning  Walk up the stairs instead of using elevators  Spend your leisure time going to events that require walking, such as outdoor festivals or fairs  This extra physical activity can help you lose weight and keep it off  Follow up with your healthcare provider as directed: You may need to meet with a dietitian  Write down your questions so you remember to ask them during your visits  © 2017 [address] Ben[address] Rosas Information is for End User's use only and may not be sold, redistributed or otherwise used for commercial purposes  All illustrations and images included in CareNotes® are the copyrighted property of A D A M , Inc  or Trino Zheng  The above information is an  only  It is not intended as medical advice for individual conditions or treatments  Talk to your doctor, nurse or pharmacist before following any medical regimen to see if it is safe and effective for you  Urinary Incontinence   WHAT YOU NEED TO KNOW:   What is urinary incontinence? Urinary incontinence (UI) is when you lose control of your bladder  What causes UI? UI occurs because your bladder cannot store or empty urine properly  The following are the most common types of UI:  · Stress incontinence  is when you leak urine due to increased bladder pressure  This may happen when you cough, sneeze, or exercise  · Urge incontinence  is when you feel the need to urinate right away and leak urine accidentally  · Mixed incontinence  is when you have both stress and urge UI  What are the signs and symptoms of UI?   · You feel like your bladder does not empty completely when you urinate  · You urinate often and need to urinate immediately  · You leak urine when you sleep, or you wake up with the urge to urinate  · You leak urine when you cough, sneeze, exercise, or laugh  How is UI diagnosed?   Your healthcare provider will ask how often you leak urine and whether you have stress or urge symptoms  Tell him which medicines you take, how often you urinate, and how much liquid you drink each day  You may need any of the following tests:  · Urine tests  may show infection or kidney function  · A pelvic exam  may be done to check for blockages  A pelvic exam will also show if your bladder, uterus, or other organs have moved out of place  · An x-ray, ultrasound, or CT  may show problems with parts of your urinary system  You may be given contrast liquid to help your organs show up better in the pictures  Tell the healthcare provider if you have ever had an allergic reaction to contrast liquid  Do not enter the MRI room with anything metal  Metal can cause serious injury  Tell the healthcare provider if you have any metal in or on your body  · A bladder scan  will show how much urine is left in your bladder after you urinate  You will be asked to urinate and then healthcare providers will use a small ultrasound machine to check the urine left in your bladder  · Cystometry  is used to check the function of your urinary system  Your healthcare provider checks the pressure in your bladder while filling it with fluid  Your bladder pressure may also be tested when your bladder is full and while you urinate  How is UI treated? · Medicines  can help strengthen your bladder control  · Electrical stimulation  is used to send a small amount of electrical energy to your pelvic floor muscles  This helps control your bladder function  Electrodes may be placed outside your body or in your rectum  For women, the electrodes may be placed in the vagina  · A bulking agent  may be injected into the wall of your urethra to make it thicker  This helps keep your urethra closed and decreases urine leakage  · Devices  such as a clamp, pessary, or tampon may help stop urine leaks  Ask your healthcare provider for more information about these and other devices       · Surgery  may be needed if other treatments do not work  Several types of surgery can help improve your bladder control  Ask your healthcare provider for more information about the surgery you may need  How can I manage my symptoms? · Do pelvic muscle exercises often  Your pelvic muscles help you stop urinating  Squeeze these muscles tight for 5 seconds, then relax for 5 seconds  Gradually work up to squeezing for 10 seconds  Do 3 sets of 15 repetitions a day, or as directed  This will help strengthen your pelvic muscles and improve bladder control  · A catheter  may be used to help empty your bladder  A catheter is a tiny, plastic tube that is put into your bladder to drain your urine  Your healthcare provider may tell you to use a catheter to prevent your bladder from getting too full and leaking urine  · Keep a UI record  Write down how often you leak urine and how much you leak  Make a note of what you were doing when you leaked urine  · Train your bladder  Go to the bathroom at set times, such as every 2 hours, even if you do not feel the urge to go  You can also try to hold your urine when you feel the urge to go  For example, hold your urine for 5 minutes when you feel the urge to go  As that becomes easier, hold your urine for 10 minutes  · Drink liquids as directed  Ask your healthcare provider how much liquid to drink each day and which liquids are best for you  You may need to limit the amount of liquid you drink to help control your urine leakage  Limit or do not have drinks that contain caffeine or alcohol  Do not drink any liquid right before you go to bed  · Prevent constipation  Eat a variety of high-fiber foods  Good examples are high-fiber cereals, beans, vegetables, and whole-grain breads  Prune juice may help make your bowel movement softer  Walking is the best way to trigger your intestines to have a bowel movement  · Exercise regularly and maintain a healthy weight    Ask your healthcare provider how much you should weigh and about the best exercise plan for you  Weight loss and exercise will decrease pressure on your bladder and help you control your leakage  Ask him to help you create a weight loss plan if you are overweight  When should I seek immediate care? · You have severe pain  · You are confused or cannot think clearly  When should I contact my healthcare provider? · You have a fever  · You see blood in your urine  · You have pain when you urinate  · You have new or worse pain, even after treatment  · Your mouth feels dry or you have vision changes  · Your urine is cloudy or smells bad  · You have questions or concerns about your condition or care  CARE AGREEMENT:   You have the right to help plan your care  Learn about your health condition and how it may be treated  Discuss treatment options with your caregivers to decide what care you want to receive  You always have the right to refuse treatment  The above information is an  only  It is not intended as medical advice for individual conditions or treatments  Talk to your doctor, nurse or pharmacist before following any medical regimen to see if it is safe and effective for you  © 2017 [address] [facility] Information is for End User's use only and may not be sold, redistributed or otherwise used for commercial purposes  All illustrations and images included in CareNotes® are the copyrighted property of A [email or web address]  or Trino Zheng  Cigarette Smoking and Your Health   AMBULATORY CARE:   Risks to your health if you smoke:  Nicotine and other chemicals found in tobacco damage every cell in your body  Even if you are a light smoker, you have an increased risk for cancer, heart disease, and lung disease  If you are pregnant or have diabetes, smoking increases your risk for complications     Benefits to your health if you stop smoking:   · You decrease respiratory symptoms such as coughing, wheezing, and shortness of breath  · You reduce your risk for cancers of the lung, mouth, throat, kidney, bladder, pancreas, stomach, and cervix  If you already have cancer, you increase the benefits of chemotherapy  You also reduce your risk for cancer returning or a second cancer from developing  · You reduce your risk for heart disease, blood clots, heart attack, and stroke  · You reduce your risk for lung infections, and diseases such as pneumonia, asthma, chronic bronchitis, and emphysema  · Your circulation improves  More oxygen can be delivered to your body  If you have diabetes, you lower your risk for complications, such as kidney, artery, and eye diseases  You also lower your risk for nerve damage  Nerve damage can lead to amputations, poor vision, and blindness  · You improve your body's ability to heal and to fight infections  Benefits to the health of others if you stop smoking:  Tobacco is harmful to nonsmokers who breathe in your secondhand smoke  The following are ways the health of others around you may improve when you stop smoking:  · You lower the risks for lung cancer and heart disease in nonsmoking adults  · If you are pregnant, you lower the risk for miscarriage, early delivery, low birth weight, and stillbirth  You also lower your baby's risk for SIDS, obesity, developmental delay, and neurobehavioral problems, such as ADHD  · If you have children, you lower their risk for ear infections, colds, pneumonia, bronchitis, and asthma  For more information and support to stop smoking:   · Smokefree  gov  Phone: 3- 583 - 288-6559  Web Address: www smokefree  gov  Follow up with your healthcare provider as directed:  Write down your questions so you remember to ask them during your visits  © 2017 [employer] Benja Rosas Information is for End User's use only and may not be sold, redistributed or otherwise used for commercial purposes   All illustrations and images included in CareNotes® are the copyrighted property of A D A M , Inc  or Trino Zheng  The above information is an  only  It is not intended as medical advice for individual conditions or treatments  Talk to your doctor, nurse or pharmacist before following any medical regimen to see if it is safe and effective for you  Fall Prevention   AMBULATORY CARE:   Fall prevention  includes ways to make your home and other areas safer  It also includes ways you can move more carefully to prevent a fall  Health conditions that cause changes in your blood pressure, vision, or muscle strength and coordination may increase your risk for falls  Medicines may also increase your risk for falls if they make you dizzy, weak, or sleepy  Call 911 or have someone else call if:   · You have fallen and are unconscious  · You have fallen and cannot move part of your body  Contact your healthcare provider if:   · You have fallen and have pain or a headache  · You have questions or concerns about your condition or care  Fall prevention tips:   · Stand or sit up slowly  This may help you keep your balance and prevent falls  · Use assistive devices as directed  Your healthcare provider may suggest that you use a cane or walker to help you keep your balance  You may need to have grab bars put in your bathroom near the toilet or in the shower  · Wear shoes that fit well and have soles that   Wear shoes both inside and outside  Use slippers with good   Do not wear shoes with high heels  · Wear a personal alarm  This is a device that allows you to call 911 if you fall and need help  Ask your healthcare provider for more information  · Stay active  Exercise can help strengthen your muscles and improve your balance  Your healthcare provider may recommend water aerobics or walking  He or she may also recommend physical therapy to improve your coordination   Never start an exercise program without talking to your healthcare provider first      · Manage your medical conditions  Keep all appointments with your healthcare providers  Visit your eye doctor as directed  Home safety tips:   · Add items to prevent falls in the bathroom  Put nonslip strips on your bath or shower floor to prevent you from slipping  Use a bath mat if you do not have carpet in the bathroom  This will prevent you from falling when you step out of the bath or shower  Use a shower seat so you do not need to stand while you shower  Sit on the toilet or a chair in your bathroom to dry yourself and put on clothing  This will prevent you from losing your balance from drying or dressing yourself while you are standing  · Keep paths clear  Remove books, shoes, and other objects from walkways and stairs  Place cords for telephones and lamps out of the way so that you do not need to walk over them  Tape them down if you cannot move them  Remove small rugs  If you cannot remove a rug, secure it with double-sided tape  This will prevent you from tripping  · Install bright lights in your home  Use night lights to help light paths to the bathroom or kitchen  Always turn on the light before you start walking  · Keep items you use often on shelves within reach  Do not use a step stool to help you reach an item  · Paint or place reflective tape on the edges of your stairs  This will help you see the stairs better  Follow up with your healthcare provider as directed:  Write down your questions so you remember to ask them during your visits  © 2017 [address] Rosas Information is for End User's use only and may not be sold, redistributed or otherwise used for commercial purposes  All illustrations and images included in CareNotes® are the copyrighted property of [employer]  or Trino Zheng  The above information is an  only   It is not intended as medical advice for individual conditions or treatments  Talk to your doctor, nurse or pharmacist before following any medical regimen to see if it is safe and effective for you  Advance Directives   WHAT YOU NEED TO KNOW:   What are advance directives? Advance directives are legal documents that state your wishes and plans for medical care  These plans are made ahead of time in case you lose your ability to make decisions for yourself  Advance directives can apply to any medical decision, such as the treatments you want, and if you want to donate organs  What are the types of advance directives? There are many types of advance directives, and each state has rules about how to use them  You may choose a combination of any of the following:  · Living will: This is a written record of the treatment you want  You can also choose which treatments you do not want, which to limit, and which to stop at a certain time  This includes surgery, medicine, IV fluid, and tube feedings  · Durable power of  for healthcare [facility]): This is a written record that states who you want to make healthcare choices for you when you are unable to make them for yourself  This person, called a proxy, is usually a family member or a friend  You may choose more than 1 proxy  · Do not resuscitate (DNR) order:  A DNR order is used in case your heart stops beating or you stop breathing  It is a request not to have certain forms of treatment, such as CPR  A DNR order may be included in other types of advance directives  · Medical directive: This covers the care that you want if you are in a coma, near death, or unable to make decisions for yourself  You can list the treatments you want for each condition  Treatment may include pain medicine, surgery, blood transfusions, dialysis, IV or tube feedings, and a ventilator (breathing machine)  · Values history:   This document has questions about your views, beliefs, and how you feel and think about life  This information can help others choose the care that you would choose  Why are advance directives important? An advance directive helps you control your care  Although spoken wishes may be used, it is better to have your wishes written down  Spoken wishes can be misunderstood, or not followed  Treatments may be given even if you do not want them  An advance directive may make it easier for your family to make difficult choices about your care  How do I decide what to put in my advance directives? · Make informed decisions:  Make sure you fully understand treatments or care you may receive  Think about the benefits and problems your decisions could cause for you or your family  Talk to healthcare providers if you have concerns or questions before you write down your wishes  You may also want to talk with your Denominational or , or a   Check your state laws to make sure that what you put in your advance directive is legal      · Sign all forms:  Sign and date your advance directive when you have finished  You may also need 2 witnesses to sign the forms  Witnesses cannot be your doctor or his staff, your spouse, heirs or beneficiaries, people you owe money to, or your chosen proxy  Talk to your family, proxy, and healthcare providers about your advance directive  Give each person a copy, and keep one for yourself in a place you can get to easily  Do not keep it hidden or locked away  · Review and revise your plans: You can revise your advance directive at any time, as long as you are able to make decisions  Review your plan every year, and when there are changes in your life, or your health  When you make changes, let your family, proxy, and healthcare providers know  Give each a new copy  Where can I find more information?   · [employer]  [address] , [place] [address]  Phone: [phone]- [phone]  Phone: [phone]- [phone]  Web Address: http://www  aafp org  · [address] [facility])  [address], [address] [facility] , [address] [place] [address]  Phone: [phone]  Phone: [phone]  Web Address: Renate rangel  CARE AGREEMENT:   You have the right to help plan your care  To help with this plan, you must learn about your health condition and treatment options  You must also learn about advance directives and how they are used  Work with your healthcare providers to decide what care will be used to treat you  You always have the right to refuse treatment  The above information is an  only  It is not intended as medical advice for individual conditions or treatments  Talk to your doctor, nurse or pharmacist before following any medical regimen to see if it is safe and effective for you  © 2017 [address]  Information is for End User's use only and may not be sold, redistributed or otherwise used for commercial purposes  All illustrations and images included in CareNotes® are the copyrighted property of A D A M , Inc  or [employer]

## 2019-07-11 LAB — HCV AB SER QL: NORMAL

## 2019-07-17 DIAGNOSIS — E03.9 HYPOTHYROIDISM, UNSPECIFIED TYPE: Primary | ICD-10-CM

## 2019-07-31 ENCOUNTER — TRANSCRIBE ORDERS (OUTPATIENT)
Dept: ADMINISTRATIVE | Facility: HOSPITAL | Age: 49
End: 2019-07-31

## 2019-07-31 DIAGNOSIS — M35.00 SJOGREN'S SYNDROME, WITH UNSPECIFIED ORGAN INVOLVEMENT (HCC): ICD-10-CM

## 2019-07-31 DIAGNOSIS — Z12.31 ENCOUNTER FOR SCREENING MAMMOGRAM FOR MALIGNANT NEOPLASM OF BREAST: Primary | ICD-10-CM

## 2019-07-31 RX ORDER — PILOCARPINE HYDROCHLORIDE 5 MG/1
5 TABLET, FILM COATED ORAL 3 TIMES DAILY
Qty: 90 TABLET | Refills: 1 | Status: SHIPPED | OUTPATIENT
Start: 2019-07-31 | End: 2019-09-17 | Stop reason: SDUPTHER

## 2019-08-19 DIAGNOSIS — Z86.39 HISTORY OF LOW POTASSIUM: ICD-10-CM

## 2019-08-19 RX ORDER — POTASSIUM CHLORIDE 20 MEQ/1
20 TABLET, EXTENDED RELEASE ORAL DAILY
Qty: 30 TABLET | Refills: 12 | Status: SHIPPED | OUTPATIENT
Start: 2019-08-19 | End: 2019-09-18 | Stop reason: ALTCHOICE

## 2019-08-26 ENCOUNTER — TELEPHONE (OUTPATIENT)
Dept: FAMILY MEDICINE CLINIC | Facility: CLINIC | Age: 49
End: 2019-08-26

## 2019-09-17 DIAGNOSIS — M35.00 SJOGREN'S SYNDROME, WITH UNSPECIFIED ORGAN INVOLVEMENT (HCC): ICD-10-CM

## 2019-09-17 RX ORDER — PILOCARPINE HYDROCHLORIDE 5 MG/1
5 TABLET, FILM COATED ORAL 3 TIMES DAILY
Qty: 90 TABLET | Refills: 1 | Status: SHIPPED | OUTPATIENT
Start: 2019-09-17 | End: 2019-09-18 | Stop reason: SDUPTHER

## 2019-09-18 ENCOUNTER — OFFICE VISIT (OUTPATIENT)
Dept: FAMILY MEDICINE CLINIC | Facility: CLINIC | Age: 49
End: 2019-09-18
Payer: MEDICARE

## 2019-09-18 ENCOUNTER — TELEPHONE (OUTPATIENT)
Dept: FAMILY MEDICINE CLINIC | Facility: CLINIC | Age: 49
End: 2019-09-18

## 2019-09-18 VITALS
TEMPERATURE: 97.5 F | WEIGHT: 250.8 LBS | BODY MASS INDEX: 42.82 KG/M2 | HEART RATE: 66 BPM | HEIGHT: 64 IN | DIASTOLIC BLOOD PRESSURE: 92 MMHG | OXYGEN SATURATION: 98 % | SYSTOLIC BLOOD PRESSURE: 132 MMHG | RESPIRATION RATE: 18 BRPM

## 2019-09-18 DIAGNOSIS — I10 BENIGN ESSENTIAL HYPERTENSION: Primary | ICD-10-CM

## 2019-09-18 DIAGNOSIS — E66.01 MORBID OBESITY (HCC): ICD-10-CM

## 2019-09-18 DIAGNOSIS — F33.2 SEVERE EPISODE OF RECURRENT MAJOR DEPRESSIVE DISORDER, WITHOUT PSYCHOTIC FEATURES (HCC): ICD-10-CM

## 2019-09-18 PROCEDURE — 99214 OFFICE O/P EST MOD 30 MIN: CPT | Performed by: NURSE PRACTITIONER

## 2019-09-18 RX ORDER — TOPIRAMATE 100 MG/1
100 TABLET, FILM COATED ORAL 2 TIMES DAILY
COMMUNITY
End: 2021-08-28

## 2019-09-18 RX ORDER — PANTOPRAZOLE SODIUM 40 MG/1
40 TABLET, DELAYED RELEASE ORAL 2 TIMES DAILY
COMMUNITY
End: 2021-02-17 | Stop reason: ALTCHOICE

## 2019-09-18 NOTE — LETTER
September 18, 2019     Patient: Killian Amaya   YOB: 1970   Date of Visit: 9/18/2019       To Whom it May Concern:    Patricia Bryant is under my professional care  She was seen in my office on 9/18/2019  She had an appt this am and able to return today  If you have any questions or concerns, please don't hesitate to call           Sincerely,          JHONATAN Sesay        CC: No Recipients

## 2019-09-18 NOTE — PROGRESS NOTES
Assessment/Plan:         Diagnoses and all orders for this visit:    Benign essential hypertension  stable  Morbid obesity (HCC)    Severe episode of recurrent major depressive disorder, without psychotic features ([facility] [address] )  Advised to call to the counselor    I got the # of the  to see if they can help her   Other orders  -     Cancel: influenza vaccine, [id number], quadrivalent, recombinant, PF, 0 5 mL, for patients 18 yr+ (FLUBLOK)  -     Cancel: Ambulatory referral to Gastroenterology; Future  -     pantoprazole (PROTONIX) 40 mg tablet; Take 40 mg by mouth 2 (two) times a day  -     topiramate (TOPAMAX) 100 mg tablet; Take 100 mg by mouth 2 (two) times a day          Subjective:      Patient ID: Rito Apgar is a 53 y o  female  HPI    Here for follow up on hypertension       Having issues with her job  Working somewhere the [employer]  Starting to isolating   Canceled her appt with counselor   We discussed getting back to them and reschedule  Cut her arm last night  Issues with car not running    Has form for the reduced transportation cost    I filled out for her  Parents are no longer speaking with her   States she has issues from childhood  Saw psychiatrist on Monday       The following portions of the patient's history were reviewed and updated as appropriate: allergies, current medications, past family history, past medical history, past social history, past surgical history and problem list     Review of Systems   Constitutional: Positive for activity change  Negative for fatigue  HENT: Negative for congestion, sinus pressure, sinus pain and sore throat  Respiratory: Negative for cough  Cardiovascular: Negative for chest pain  Psychiatric/Behavioral: Positive for self-injury           Objective:  Vitals:    19 0651   BP: 132/92   BP Location: Left arm   Patient Position: Sitting   Cuff Size: Large   Pulse: 66   Resp: 18   Temp: 97 5 °F (36 4 °C)   TempSrc: Temporal   SpO2: 98%   Weight: 114 kg (250 lb 12 8 oz)   Height: 5' 3 5" (1 613 m)      Physical Exam   Constitutional: She is oriented to person, place, and time  She appears well-developed  She appears distressed  HENT:   Right Ear: External ear normal    Left Ear: External ear normal    Nose: Nose normal    Mouth/Throat: Oropharynx is clear and moist    Eyes: Conjunctivae are normal    Neck: Neck supple  Cardiovascular: Normal rate, regular rhythm and normal heart sounds  Pulmonary/Chest: Effort normal and breath sounds normal    Lymphadenopathy:     She has no cervical adenopathy  Neurological: She is alert and oriented to person, place, and time  Skin:        Psychiatric: Her speech is normal and behavior is normal  Judgment normal  Cognition and memory are normal  She exhibits a depressed mood  She expresses no suicidal ideation  Cut her left arm last night  Stressed with finance and work    Cut her back as far as hrs now can't afford her truck    Vitals reviewed

## 2019-09-18 NOTE — TELEPHONE ENCOUNTER
Talked with her    [facility]  Made aware of her situation today   They are trying to hook her up for [facility] for her appts  States Josemanuelne Blake doesn't answer her calls   But they will try to put her in a more intensive program

## 2019-10-11 ENCOUNTER — APPOINTMENT (OUTPATIENT)
Dept: RADIOLOGY | Age: 49
End: 2019-10-11
Payer: MEDICARE

## 2019-10-11 ENCOUNTER — OFFICE VISIT (OUTPATIENT)
Dept: FAMILY MEDICINE CLINIC | Facility: CLINIC | Age: 49
End: 2019-10-11
Payer: MEDICARE

## 2019-10-11 VITALS
DIASTOLIC BLOOD PRESSURE: 94 MMHG | RESPIRATION RATE: 18 BRPM | SYSTOLIC BLOOD PRESSURE: 150 MMHG | BODY MASS INDEX: 43.59 KG/M2 | HEART RATE: 88 BPM | WEIGHT: 250 LBS

## 2019-10-11 DIAGNOSIS — M25.561 ACUTE PAIN OF RIGHT KNEE: ICD-10-CM

## 2019-10-11 DIAGNOSIS — M54.50 LUMBAR SPINE PAIN: ICD-10-CM

## 2019-10-11 DIAGNOSIS — M79.605 LEFT LEG PAIN: ICD-10-CM

## 2019-10-11 DIAGNOSIS — M25.561 ACUTE PAIN OF RIGHT KNEE: Primary | ICD-10-CM

## 2019-10-11 PROCEDURE — 72100 X-RAY EXAM L-S SPINE 2/3 VWS: CPT

## 2019-10-11 PROCEDURE — 73564 X-RAY EXAM KNEE 4 OR MORE: CPT

## 2019-10-11 PROCEDURE — 73590 X-RAY EXAM OF LOWER LEG: CPT

## 2019-10-11 PROCEDURE — 99213 OFFICE O/P EST LOW 20 MIN: CPT | Performed by: NURSE PRACTITIONER

## 2019-10-11 RX ORDER — METHOCARBAMOL 500 MG/1
500 TABLET, FILM COATED ORAL 2 TIMES DAILY PRN
Qty: 20 TABLET | Refills: 0 | Status: SHIPPED | OUTPATIENT
Start: 2019-10-11 | End: 2019-10-15 | Stop reason: CLARIF

## 2019-10-11 NOTE — PROGRESS NOTES
Assessment/Plan:         Diagnoses and all orders for this visit:    Acute pain of right knee  -     XR knee 4+ vw right injury; Future    Left leg pain  -     XR tibia fibula 2 vw left; Future    Lumbar spine pain  -     XR spine lumbar 2 or 3 views injury; Future  -     methocarbamol (ROBAXIN) 500 mg tablet; Take 1 tablet (500 mg total) by mouth 2 (two) times a day as needed for muscle spasms  Once she has films may need PT   Will wait and see results   Can't take any limitations due to losing job   They are aware of injury  She states no workmans comp        Subjective:      Patient ID: Oh Dos Santos is a 53 y o  female  HPI    Had a fall at work 2 weeks ago   Had a form filled out   Jonatan on left knee and has pain   Right knee landed too and pain there too  Having lower back pain  Since the fall  Bruise on the left upper arm   Right knee  With bruising and left bruised below knee   Foot between pallets and twisted back        The following portions of the patient's history were reviewed and updated as appropriate: allergies, current medications, past family history, past medical history, past social history, past surgical history and problem list     Review of Systems   Constitutional: Positive for activity change  Negative for appetite change  HENT: Negative for congestion, sinus pain and sore throat  Respiratory: Negative for cough  Cardiovascular: Negative for chest pain  Genitourinary: Negative for frequency and urgency  Musculoskeletal: Positive for back pain and joint swelling  Bilateral knee pain    Neurological: Negative for dizziness, seizures, weakness, light-headedness, numbness and headaches  Objective:  Vitals:    10/11/19 1130   BP: 150/94   BP Location: Left arm   Patient Position: Sitting   Cuff Size: Large   Pulse: 88   Resp: 18   Weight: 113 kg (250 lb)      Physical Exam   Constitutional: She appears distressed     Musculoskeletal:        Right knee: She exhibits swelling, effusion and ecchymosis  Tenderness found  Left knee: Tenderness found  Lumbar back: She exhibits tenderness, pain and spasm  Legs:  Vitals reviewed

## 2019-10-15 ENCOUNTER — TELEPHONE (OUTPATIENT)
Dept: FAMILY MEDICINE CLINIC | Facility: CLINIC | Age: 49
End: 2019-10-15

## 2019-10-15 DIAGNOSIS — M54.50 LUMBAR SPINE PAIN: Primary | ICD-10-CM

## 2019-10-15 RX ORDER — CYCLOBENZAPRINE HCL 5 MG
5 TABLET ORAL
Qty: 20 TABLET | Refills: 0 | Status: SHIPPED | OUTPATIENT
Start: 2019-10-15 | End: 2019-10-30 | Stop reason: SDUPTHER

## 2019-10-16 ENCOUNTER — TRANSCRIBE ORDERS (OUTPATIENT)
Dept: LAB | Facility: CLINIC | Age: 49
End: 2019-10-16

## 2019-10-16 ENCOUNTER — APPOINTMENT (OUTPATIENT)
Dept: LAB | Facility: CLINIC | Age: 49
End: 2019-10-16
Payer: MEDICARE

## 2019-10-16 DIAGNOSIS — F31.30 BIPOLAR I DISORDER, MOST RECENT EPISODE DEPRESSED (HCC): Primary | ICD-10-CM

## 2019-10-16 DIAGNOSIS — F31.30 BIPOLAR I DISORDER, MOST RECENT EPISODE DEPRESSED (HCC): ICD-10-CM

## 2019-10-16 DIAGNOSIS — E03.9 ACQUIRED HYPOTHYROIDISM: ICD-10-CM

## 2019-10-16 DIAGNOSIS — E03.9 HYPOTHYROIDISM, UNSPECIFIED TYPE: ICD-10-CM

## 2019-10-16 LAB
ALBUMIN SERPL BCP-MCNC: 3.6 G/DL (ref 3.5–5)
ALP SERPL-CCNC: 65 U/L (ref 46–116)
ALT SERPL W P-5'-P-CCNC: 18 U/L (ref 12–78)
ANION GAP SERPL CALCULATED.3IONS-SCNC: 5 MMOL/L (ref 4–13)
AST SERPL W P-5'-P-CCNC: 14 U/L (ref 5–45)
BASOPHILS # BLD AUTO: 0.04 THOUSANDS/ΜL (ref 0–0.1)
BASOPHILS NFR BLD AUTO: 0 % (ref 0–1)
BILIRUB SERPL-MCNC: 0.29 MG/DL (ref 0.2–1)
BUN SERPL-MCNC: 11 MG/DL (ref 5–25)
CALCIUM SERPL-MCNC: 9.2 MG/DL (ref 8.3–10.1)
CHLORIDE SERPL-SCNC: 104 MMOL/L (ref 100–108)
CHOLEST SERPL-MCNC: 232 MG/DL (ref 50–200)
CO2 SERPL-SCNC: 28 MMOL/L (ref 21–32)
CREAT SERPL-MCNC: 0.75 MG/DL (ref 0.6–1.3)
EOSINOPHIL # BLD AUTO: 0.05 THOUSAND/ΜL (ref 0–0.61)
EOSINOPHIL NFR BLD AUTO: 0 % (ref 0–6)
ERYTHROCYTE [DISTWIDTH] IN BLOOD BY AUTOMATED COUNT: 12.4 % (ref 11.6–15.1)
EST. AVERAGE GLUCOSE BLD GHB EST-MCNC: 108 MG/DL
GFR SERPL CREATININE-BSD FRML MDRD: 95 ML/MIN/1.73SQ M
GLUCOSE P FAST SERPL-MCNC: 94 MG/DL (ref 65–99)
HBA1C MFR BLD: 5.4 % (ref 4.2–6.3)
HCT VFR BLD AUTO: 45.8 % (ref 34.8–46.1)
HDLC SERPL-MCNC: 58 MG/DL (ref 40–60)
HGB BLD-MCNC: 14.8 G/DL (ref 11.5–15.4)
IMM GRANULOCYTES # BLD AUTO: 0.06 THOUSAND/UL (ref 0–0.2)
IMM GRANULOCYTES NFR BLD AUTO: 1 % (ref 0–2)
LDLC SERPL CALC-MCNC: 144 MG/DL (ref 0–100)
LYMPHOCYTES # BLD AUTO: 3.11 THOUSANDS/ΜL (ref 0.6–4.47)
LYMPHOCYTES NFR BLD AUTO: 26 % (ref 14–44)
MCH RBC QN AUTO: 31.2 PG (ref 26.8–34.3)
MCHC RBC AUTO-ENTMCNC: 32.3 G/DL (ref 31.4–37.4)
MCV RBC AUTO: 96 FL (ref 82–98)
MONOCYTES # BLD AUTO: 1.15 THOUSAND/ΜL (ref 0.17–1.22)
MONOCYTES NFR BLD AUTO: 10 % (ref 4–12)
NEUTROPHILS # BLD AUTO: 7.48 THOUSANDS/ΜL (ref 1.85–7.62)
NEUTS SEG NFR BLD AUTO: 63 % (ref 43–75)
NONHDLC SERPL-MCNC: 174 MG/DL
NRBC BLD AUTO-RTO: 0 /100 WBCS
PLATELET # BLD AUTO: 117 THOUSANDS/UL (ref 149–390)
PMV BLD AUTO: 11.4 FL (ref 8.9–12.7)
POTASSIUM SERPL-SCNC: 3.9 MMOL/L (ref 3.5–5.3)
PROT SERPL-MCNC: 7.2 G/DL (ref 6.4–8.2)
RBC # BLD AUTO: 4.75 MILLION/UL (ref 3.81–5.12)
SODIUM SERPL-SCNC: 137 MMOL/L (ref 136–145)
T3FREE SERPL-MCNC: 2.27 PG/ML (ref 2.3–4.2)
T4 FREE SERPL-MCNC: 0.91 NG/DL (ref 0.76–1.46)
TRIGL SERPL-MCNC: 151 MG/DL
TSH SERPL DL<=0.05 MIU/L-ACNC: 6.27 UIU/ML (ref 0.36–3.74)
VALPROATE SERPL-MCNC: 28 UG/ML (ref 50–100)
WBC # BLD AUTO: 11.89 THOUSAND/UL (ref 4.31–10.16)

## 2019-10-16 PROCEDURE — 85025 COMPLETE CBC W/AUTO DIFF WBC: CPT

## 2019-10-16 PROCEDURE — 84481 FREE ASSAY (FT-3): CPT

## 2019-10-16 PROCEDURE — 83036 HEMOGLOBIN GLYCOSYLATED A1C: CPT

## 2019-10-16 PROCEDURE — 80053 COMPREHEN METABOLIC PANEL: CPT

## 2019-10-16 PROCEDURE — 80164 ASSAY DIPROPYLACETIC ACD TOT: CPT

## 2019-10-16 PROCEDURE — 80061 LIPID PANEL: CPT

## 2019-10-16 PROCEDURE — 36415 COLL VENOUS BLD VENIPUNCTURE: CPT

## 2019-10-16 PROCEDURE — 84439 ASSAY OF FREE THYROXINE: CPT

## 2019-10-16 PROCEDURE — 84443 ASSAY THYROID STIM HORMONE: CPT

## 2019-10-30 DIAGNOSIS — M54.50 LUMBAR SPINE PAIN: ICD-10-CM

## 2019-10-30 RX ORDER — CYCLOBENZAPRINE HCL 5 MG
5 TABLET ORAL
Qty: 20 TABLET | Refills: 0 | Status: SHIPPED | OUTPATIENT
Start: 2019-10-30 | End: 2019-11-14 | Stop reason: SDUPTHER

## 2019-11-01 DIAGNOSIS — I10 HYPERTENSION, UNSPECIFIED TYPE: ICD-10-CM

## 2019-11-01 RX ORDER — FUROSEMIDE 20 MG/1
20 TABLET ORAL DAILY
Qty: 30 TABLET | Refills: 3 | Status: SHIPPED | OUTPATIENT
Start: 2019-11-01 | End: 2020-01-02 | Stop reason: SDUPTHER

## 2019-11-13 ENCOUNTER — TELEPHONE (OUTPATIENT)
Dept: FAMILY MEDICINE CLINIC | Facility: CLINIC | Age: 49
End: 2019-11-13

## 2019-11-13 ENCOUNTER — APPOINTMENT (EMERGENCY)
Dept: ULTRASOUND IMAGING | Facility: HOSPITAL | Age: 49
End: 2019-11-13
Payer: MEDICARE

## 2019-11-13 ENCOUNTER — HOSPITAL ENCOUNTER (EMERGENCY)
Facility: HOSPITAL | Age: 49
Discharge: HOME | End: 2019-11-13
Attending: EMERGENCY MEDICINE | Admitting: EMERGENCY MEDICINE
Payer: MEDICARE

## 2019-11-13 VITALS
WEIGHT: 262.35 LBS | RESPIRATION RATE: 18 BRPM | OXYGEN SATURATION: 99 % | BODY MASS INDEX: 45.74 KG/M2 | TEMPERATURE: 98.2 F | HEART RATE: 74 BPM | DIASTOLIC BLOOD PRESSURE: 86 MMHG | SYSTOLIC BLOOD PRESSURE: 149 MMHG

## 2019-11-13 DIAGNOSIS — R60.9 DEPENDENT EDEMA: Primary | ICD-10-CM

## 2019-11-13 LAB
ANION GAP SERPL CALCULATED.3IONS-SCNC: 9 MMOL/L (ref 4–13)
APTT PPP: 28 SECONDS (ref 23–37)
BASOPHILS # BLD AUTO: 0.06 THOUSANDS/ΜL (ref 0–0.1)
BASOPHILS NFR BLD AUTO: 1 % (ref 0–1)
BUN SERPL-MCNC: 11 MG/DL (ref 5–25)
CALCIUM SERPL-MCNC: 8.6 MG/DL (ref 8.3–10.1)
CHLORIDE SERPL-SCNC: 101 MMOL/L (ref 100–108)
CO2 SERPL-SCNC: 28 MMOL/L (ref 21–32)
CREAT SERPL-MCNC: 0.68 MG/DL (ref 0.6–1.3)
EOSINOPHIL # BLD AUTO: 0.07 THOUSAND/ΜL (ref 0–0.61)
EOSINOPHIL NFR BLD AUTO: 1 % (ref 0–6)
ERYTHROCYTE [DISTWIDTH] IN BLOOD BY AUTOMATED COUNT: 12.9 % (ref 11.6–15.1)
GFR SERPL CREATININE-BSD FRML MDRD: 104 ML/MIN/1.73SQ M
GLUCOSE SERPL-MCNC: 81 MG/DL (ref 65–140)
HCT VFR BLD AUTO: 42.5 % (ref 34.8–46.1)
HGB BLD-MCNC: 13.9 G/DL (ref 11.5–15.4)
IMM GRANULOCYTES # BLD AUTO: 0.09 THOUSAND/UL (ref 0–0.2)
IMM GRANULOCYTES NFR BLD AUTO: 1 % (ref 0–2)
INR PPP: 0.94 (ref 0.84–1.19)
LYMPHOCYTES # BLD AUTO: 2.69 THOUSANDS/ΜL (ref 0.6–4.47)
LYMPHOCYTES NFR BLD AUTO: 29 % (ref 14–44)
MCH RBC QN AUTO: 31.5 PG (ref 26.8–34.3)
MCHC RBC AUTO-ENTMCNC: 32.7 G/DL (ref 31.4–37.4)
MCV RBC AUTO: 96 FL (ref 82–98)
MONOCYTES # BLD AUTO: 0.76 THOUSAND/ΜL (ref 0.17–1.22)
MONOCYTES NFR BLD AUTO: 8 % (ref 4–12)
NEUTROPHILS # BLD AUTO: 5.72 THOUSANDS/ΜL (ref 1.85–7.62)
NEUTS SEG NFR BLD AUTO: 60 % (ref 43–75)
NRBC BLD AUTO-RTO: 0 /100 WBCS
PLATELET # BLD AUTO: 189 THOUSANDS/UL (ref 149–390)
PMV BLD AUTO: 10.1 FL (ref 8.9–12.7)
POTASSIUM SERPL-SCNC: 4.6 MMOL/L (ref 3.5–5.3)
PROTHROMBIN TIME: 12 SECONDS (ref 11.6–14.5)
RBC # BLD AUTO: 4.41 MILLION/UL (ref 3.81–5.12)
SODIUM SERPL-SCNC: 138 MMOL/L (ref 136–145)
WBC # BLD AUTO: 9.39 THOUSAND/UL (ref 4.31–10.16)

## 2019-11-13 PROCEDURE — 80048 BASIC METABOLIC PNL TOTAL CA: CPT | Performed by: PHYSICIAN ASSISTANT

## 2019-11-13 PROCEDURE — 85025 COMPLETE CBC W/AUTO DIFF WBC: CPT | Performed by: PHYSICIAN ASSISTANT

## 2019-11-13 PROCEDURE — 99284 EMERGENCY DEPT VISIT MOD MDM: CPT

## 2019-11-13 PROCEDURE — 93971 EXTREMITY STUDY: CPT | Performed by: SURGERY

## 2019-11-13 PROCEDURE — 93971 EXTREMITY STUDY: CPT

## 2019-11-13 PROCEDURE — 36415 COLL VENOUS BLD VENIPUNCTURE: CPT | Performed by: PHYSICIAN ASSISTANT

## 2019-11-13 PROCEDURE — 99284 EMERGENCY DEPT VISIT MOD MDM: CPT | Performed by: PHYSICIAN ASSISTANT

## 2019-11-13 PROCEDURE — 85610 PROTHROMBIN TIME: CPT | Performed by: PHYSICIAN ASSISTANT

## 2019-11-13 PROCEDURE — 85730 THROMBOPLASTIN TIME PARTIAL: CPT | Performed by: PHYSICIAN ASSISTANT

## 2019-11-13 PROCEDURE — 96374 THER/PROPH/DIAG INJ IV PUSH: CPT

## 2019-11-13 RX ORDER — FUROSEMIDE 10 MG/ML
20 INJECTION INTRAMUSCULAR; INTRAVENOUS ONCE
Status: COMPLETED | OUTPATIENT
Start: 2019-11-13 | End: 2019-11-13

## 2019-11-13 RX ORDER — FUROSEMIDE 20 MG/1
20 TABLET ORAL DAILY
Qty: 2 TABLET | Refills: 0 | Status: SHIPPED | OUTPATIENT
Start: 2019-11-13 | End: 2019-11-13 | Stop reason: CLARIF

## 2019-11-13 RX ADMIN — FUROSEMIDE 20 MG: 10 INJECTION, SOLUTION INTRAMUSCULAR; INTRAVENOUS at 18:08

## 2019-11-13 NOTE — TELEPHONE ENCOUNTER
ED  Thursday 11/7/19    RIGHT LEG   ALL SWOLLEN AND RED  Worse today than on  Thursday   Sees Duy Knapp can not get to office because of transportation issue  Wants advice   Told patient would most likely have to return to [phone]

## 2019-11-13 NOTE — TELEPHONE ENCOUNTER
I  Didn't see this so nothing can to treated over phone    If she can't get in here needs to go back to ER

## 2019-11-13 NOTE — DISCHARGE INSTRUCTIONS
Take 20 mg of Lasix twice a day for the next 2 days     Make a follow-up appoint with her family physician for further evaluation  Please wear your elastic stockings as she was previously instructed to do

## 2019-11-13 NOTE — ED PROVIDER NOTES
History  Chief Complaint   Patient presents with    Leg Pain     Pt arrives via EMS from home for pain in her right lower calf  Pt reports tingling and numbness in her foot  Pt reports increased swelling  Pt reports being at [facility] and they were going to ultrasound her leg but she did not have time to wait for it  Hx of blood clots in family  History provided by:  Patient  Leg Pain   Location:  Leg  Time since incident:  7 days  Injury: no    Leg location:  R leg  Pain details:     Quality:  Aching    Severity:  Moderate    Onset quality:  Gradual    Duration:  1 week    Timing:  Constant    Progression:  Waxing and waning  Chronicity:  New  Dislocation: no    Prior injury to area:  No  Relieved by:  None tried  Worsened by:  Nothing  Ineffective treatments:  None tried  Associated symptoms: swelling and tingling    Associated symptoms: no back pain, no decreased ROM, no fatigue and no fever    Risk factors: obesity    Risk factors: no concern for non-accidental trauma, no frequent fractures and no known bone disorder        Prior to Admission Medications   Prescriptions Last Dose Informant Patient Reported? Taking?    TOVIAZ 4 MG TB24  Self No No   Sig: Take 1 tablet (4 mg total) by mouth daily   buPROPion (WELLBUTRIN XL) 150 mg 24 hr tablet  Self No No   Sig: Take 1 tablet (150 mg total) by mouth daily   colestipol (COLESTID) 1 g tablet  Self No No   Sig: Take 1 tablet (1 g total) by mouth 2 (two) times a day   cyclobenzaprine (FLEXERIL) 5 mg tablet   No No   Sig: TAKE 1 TABLET (5 MG TOTAL) BY MOUTH DAILY AT BEDTIME AS NEEDED FOR MUSCLE SPASMS   divalproex sodium (DEPAKOTE) 250 mg EC tablet  Self No No   Sig: Take 3 tablets (750 mg total) by mouth 2 (two) times a day   estradiol (ESTRACE) 1 mg tablet  Self Yes No   Sig: Take 1 mg by mouth daily   furosemide (LASIX) 20 mg tablet   No No   Sig: TAKE 1 TABLET (20 MG TOTAL) BY MOUTH DAILY   losartan (COZAAR) 50 mg tablet  Self No No   Sig: Take 1 tablet (50 mg total) by mouth daily   lurasidone (LATUDA) 80 mg tablet  Self No No   Sig: Take 2 tablets (160 mg total) by mouth daily with dinner   metoprolol tartrate (LOPRESSOR) 25 mg tablet  Self No No   Sig: Take 1 tablet (25 mg total) by mouth 2 (two) times a day   pantoprazole (PROTONIX) 40 mg tablet  Self Yes No   Sig: Take 40 mg by mouth 2 (two) times a day   pilocarpine (SALAGEN) 5 mg tablet  Self No No   Sig: Take 1 tablet (5 mg total) by mouth 3 (three) times a day   topiramate (TOPAMAX) 100 mg tablet  Self Yes No   Sig: Take 100 mg by mouth 2 (two) times a day   traZODone (DESYREL) 100 mg tablet  Self No No   Sig: Take 2 tablets (200 mg total) by mouth daily at bedtime for 212 days      Facility-Administered Medications: None       Past Medical History:   Diagnosis Date    Anxiety     Arthritis     oa cassandra knees    Asthma     good control- no medications    Yan's esophagus     Bipolar affective disorder (HCC)     Breathing difficulty     After vascular surgery was diagnosed with sleep apnea    Chronic pain disorder     lumbar    CPAP (continuous positive airway pressure) dependence     Degenerative disc disease at L5-S1 level     Deliberate self-cutting     Depression 09/16/2008    Disease of thyroid gland     hypo    MARTINEZ (dyspnea on exertion)     Drug overdose 10/28/2008    suicide attempt    Dysphagia     Dyspnea     Edema     BLE    GERD (gastroesophageal reflux disease)     High blood pressure     Knee pain, bilateral     Especially right    Migraines     Obese     Overactive bladder     Sjogren's disease ([facility] [address] )     Sleep apnea     Stress incontinence     Suicidal ideations     Umbilical hernia     surgical repair today 4/24/2019    Use of cane as ambulatory aid     awaiting b/l knee replacement    Wears glasses        Past Surgical History:   Procedure Laterality Date    BREAST LUMPECTOMY Right     CARPAL TUNNEL RELEASE Left     CHOLECYSTECTOMY  05/2003 Laparoscopic    COLONOSCOPY      2009    DILATION AND CURETTAGE OF UTERUS      ELBOW SURGERY Left     x2  No hardware    ESOPHAGOGASTRODUODENOSCOPY      FOOT SURGERY Left     Plantar fasciotomy    HYSTERECTOMY      laporoscopic, partial    KNEE ARTHROSCOPY Bilateral     MN ANAL SPHINCTEROTOMY N/A 2018    Procedure: EUA, LEFT PARTIAL INTERNAL SPHINCTEROTOMY;  Surgeon: Yessi Herzog MD;  Location: [address] [place] [address] MAIN OR;  Service: Colorectal    MN REPAIR UMBILICAL UUSJ,2+I/D,XBBZL N/A 2019    Procedure: REPAIR HERNIA UMBILICAL LAPAROSCOPIC W/ ROBOTICS;  Surgeon: Luz Valdez MD;  Location: [facility] Main OR;  Service: General    REDUCTION MAMMAPLASTY Bilateral     REPAIR LACERATION Left     left hand-2009    TONSILECTOMY AND ADNOIDECTOMY      TONSILLECTOMY      VEIN LIGATION Bilateral     WISDOM TOOTH EXTRACTION         Family History   Problem Relation Age of Onset    Kidney cancer Mother     Diabetes Mother     Colon cancer Family      I have reviewed and agree with the history as documented  Social History     Tobacco Use    Smoking status: Former Smoker     Packs/day: 3 00     Types: Cigarettes     Last attempt to quit: 2018     Years since quittin 8    Smokeless tobacco: Never Used    Tobacco comment: Started at age 14  Substance Use Topics    Alcohol use: Not Currently     Comment: Recovering alcoholic    Drug use: Yes     Types: Marijuana     Comment: drug overdose intentional-ambien and risperidal  Occasional marijuana        Review of Systems   Constitutional: Positive for activity change  Negative for chills, fatigue and fever  Respiratory: Negative for chest tightness and shortness of breath  Cardiovascular: Positive for leg swelling  Negative for chest pain  Musculoskeletal: Negative for back pain  Skin: Positive for color change  All other systems reviewed and are negative        Physical Exam  Physical Exam   Constitutional: She appears well-developed and well-nourished  No distress  HENT:   Head: Normocephalic and atraumatic  Right Ear: External ear normal    Left Ear: External ear normal    Nose: Nose normal    Eyes: Conjunctivae are normal  Right eye exhibits no discharge  Neck: Neck supple  No JVD present  No tracheal deviation present  Cardiovascular: Normal rate, regular rhythm and normal heart sounds  No murmur heard  Pulmonary/Chest: Effort normal and breath sounds normal  No stridor  No respiratory distress  She has no wheezes  She has no rales  Musculoskeletal: She exhibits edema  Plus two pitting edema right lower leg  Plus one left lower leg  Positive Homans sign on the right  Positive calf tenderness on the right  Negative on the left  Skin: Capillary refill takes less than 2 seconds  She is not diaphoretic  Venous blush of both lower extremities  No warmth  Not tender upon palpation  Psychiatric: She has a normal mood and affect  Her behavior is normal  Judgment and thought content normal    Nursing note and vitals reviewed        Vital Signs  ED Triage Vitals   Temperature Pulse Respirations Blood Pressure SpO2   11/13/19 1704 11/13/19 1641 11/13/19 1641 11/13/19 1641 11/13/19 1641   98 2 °F (36 8 °C) 74 18 (!) 172/98 98 %      Temp Source Heart Rate Source Patient Position - Orthostatic VS BP Location FiO2 (%)   11/13/19 1704 11/13/19 1641 11/13/19 1641 11/13/19 1641 --   Oral Monitor Sitting Right arm       Pain Score       11/13/19 1641       6           Vitals:    11/13/19 1641   BP: (!) 172/98   Pulse: 74   Patient Position - Orthostatic VS: Sitting         Visual Acuity      ED Medications  Medications   furosemide (LASIX) injection 20 mg (has no administration in time range)       Diagnostic Studies  Results Reviewed     Procedure Component Value Units Date/Time    Basic metabolic panel [[id number]] Collected:  11/13/19 1711    Lab Status:  Final result Specimen:  Blood from Arm, Right Updated:  11/13/19 1727     Sodium 138 mmol/L      Potassium 4 6 mmol/L      Chloride 101 mmol/L      CO2 28 mmol/L      ANION GAP 9 mmol/L      BUN 11 mg/dL      Creatinine 0 68 mg/dL      Glucose 81 mg/dL      Calcium 8 6 mg/dL      eGFR 104 ml/min/1 73sq m     Narrative:       National Kidney Disease Foundation guidelines for Chronic Kidney Disease (CKD):     Stage 1 with normal or high GFR (GFR > 90 mL/min/1 73 square meters)    Stage 2 Mild CKD (GFR = 60-89 mL/min/1 73 square meters)    Stage 3A Moderate CKD (GFR = 45-59 mL/min/1 73 square meters)    Stage 3B Moderate CKD (GFR = 30-44 mL/min/1 73 square meters)    Stage 4 Severe CKD (GFR = 15-29 mL/min/1 73 square meters)    Stage 5 End Stage CKD (GFR <15 mL/min/1 73 square meters)  Note: GFR calculation is accurate only with a steady state creatinine    Protime-INR [[id number]]  (Normal) Collected:  11/13/19 1711    Lab Status:  Final result Specimen:  Blood from Arm, Right Updated:  11/13/19 1725     Protime 12 0 seconds      INR 0 94    APTT [[id number]]  (Normal) Collected:  11/13/19 1711    Lab Status:  Final result Specimen:  Blood from Arm, Right Updated:  11/13/19 1725     PTT 28 seconds     CBC and differential [[id number]] Collected:  11/13/19 1711    Lab Status:  Final result Specimen:  Blood from Arm, Right Updated:  11/13/19 1719     WBC 9 39 Thousand/uL      RBC 4 41 Million/uL      Hemoglobin 13 9 g/dL      Hematocrit 42 5 %      MCV 96 fL      MCH 31 5 pg      MCHC 32 7 g/dL      RDW 12 9 %      MPV 10 1 fL      Platelets 724 Thousands/uL      nRBC 0 /100 WBCs      Neutrophils Relative 60 %      Immat GRANS % 1 %      Lymphocytes Relative 29 %      Monocytes Relative 8 %      Eosinophils Relative 1 %      Basophils Relative 1 %      Neutrophils Absolute 5 72 Thousands/µL      Immature Grans Absolute 0 09 Thousand/uL      Lymphocytes Absolute 2 69 Thousands/µL      Monocytes Absolute 0 76 Thousand/µL      Eosinophils Absolute 0 07 Thousand/µL      Basophils Absolute 0 06 Thousands/µL                  VAS lower limb venous duplex study, unilateral/limited    (Results Pending)              Procedures  Procedures       ED Course  ED Course as of Nov 13 1751 Wed Nov 13, 2019   1739 Negative for DVT  MDM  Number of Diagnoses or Management Options  Dependent edema: new and requires workup     Amount and/or Complexity of Data Reviewed  Clinical lab tests: ordered and reviewed  Tests in the radiology section of CPT®: ordered and reviewed  Tests in the medicine section of CPT®: ordered and reviewed    Risk of Complications, Morbidity, and/or Mortality  Presenting problems: high  Diagnostic procedures: high  Management options: high  General comments: Patient presents emergency room for swelling in both lower legs  She complained of right sided calf pain  She was seen and evaluated  Laboratory studies were taken and were reviewed  Ultrasound of her right leg was negative for DVT  Patient was diagnosed with dependent edema  She was instructed to increase her Lasix to twice daily over the next 2 days  She will have a recheck examination with her physician in 2 days  I have asked her to start wearing her compression stockings as she was previously advised  Should her symptoms worsen, she may return to the emergency room at any time  Patient Progress  Patient progress: stable      Disposition  Final diagnoses:   Dependent edema - Both lower legs     Time reflects when diagnosis was documented in both MDM as applicable and the Disposition within this note     Time User Action Codes Description Comment    11/13/2019  5:40 PM Levorn Flock Add [R60 9] Dependent edema     11/13/2019  5:40 PM Levorn Flock Modify [R60 9] Dependent edema Both lower legs      ED Disposition     ED Disposition Condition Date/Time Comment    Discharge Stable Wed Nov 13, 2019  5:40  [address] discharge to home/self care              Follow-up Information Follow up With Specialties Details Why LUCAS Guillermina [address], [address] Lily Rosas Nurse Practitioner Schedule an appointment as soon as possible for a visit in 2 days  [address] [place] [address]  [phone]            Current Discharge Medication List      CONTINUE these medications which have NOT CHANGED    Details   buPROPion (WELLBUTRIN XL) 150 mg 24 hr tablet Take 1 tablet (150 mg total) by mouth daily  Qty: 30 tablet, Refills: 0    Associated Diagnoses: Schizoaffective disorder, bipolar type (HCC)      colestipol (COLESTID) 1 g tablet Take 1 tablet (1 g total) by mouth 2 (two) times a day  Qty: 60 tablet, Refills: 0    Associated Diagnoses: Hypercholesteremia      cyclobenzaprine (FLEXERIL) 5 mg tablet TAKE 1 TABLET (5 MG TOTAL) BY MOUTH DAILY AT BEDTIME AS NEEDED FOR MUSCLE SPASMS  Qty: 20 tablet, Refills: 0    Associated Diagnoses: Lumbar spine pain      divalproex sodium (DEPAKOTE) 250 mg EC tablet Take 3 tablets (750 mg total) by mouth 2 (two) times a day  Qty: 180 tablet, Refills: 0    Associated Diagnoses: Hypertension, unspecified type      estradiol (ESTRACE) 1 mg tablet Take 1 mg by mouth daily      furosemide (LASIX) 20 mg tablet TAKE 1 TABLET (20 MG TOTAL) BY MOUTH DAILY  Qty: 30 tablet, Refills: 3    Associated Diagnoses: Hypertension, unspecified type      losartan (COZAAR) 50 mg tablet Take 1 tablet (50 mg total) by mouth daily  Qty: 30 tablet, Refills: 0    Associated Diagnoses: Hypertension, unspecified type      lurasidone (LATUDA) 80 mg tablet Take 2 tablets (160 mg total) by mouth daily with dinner  Qty: 60 tablet, Refills: 0    Associated Diagnoses: Schizoaffective disorder, bipolar type (HCC)      metoprolol tartrate (LOPRESSOR) 25 mg tablet Take 1 tablet (25 mg total) by mouth 2 (two) times a day  Qty: 60 tablet, Refills: 5    Associated Diagnoses: Hypertension, unspecified type      pantoprazole (PROTONIX) 40 mg tablet Take 40 mg by mouth 2 (two) times a day      pilocarpine (SALAGEN) 5 mg tablet Take 1 tablet (5 mg total) by mouth 3 (three) times a day  Qty: 60 tablet, Refills: 0    Associated Diagnoses: Sjogren's syndrome (HCC)      topiramate (TOPAMAX) 100 mg tablet Take 100 mg by mouth 2 (two) times a day      TOVIAZ 4 MG TB24 Take 1 tablet (4 mg total) by mouth daily  Refills: 2    Associated Diagnoses: Chronic obstructive pulmonary disease with acute exacerbation (HCC)      traZODone (DESYREL) 100 mg tablet Take 2 tablets (200 mg total) by mouth daily at bedtime for 212 days  Qty: 60 tablet, Refills: 0    Associated Diagnoses: Insomnia, unspecified type           No discharge procedures on file      ED Provider  Electronically Signed by           Grace Ledbetter PA-C  11/13/19 6877

## 2019-11-14 ENCOUNTER — TELEPHONE (OUTPATIENT)
Dept: FAMILY MEDICINE CLINIC | Facility: CLINIC | Age: 49
End: 2019-11-14

## 2019-11-14 DIAGNOSIS — M54.50 LUMBAR SPINE PAIN: ICD-10-CM

## 2019-11-14 RX ORDER — CYCLOBENZAPRINE HCL 5 MG
5 TABLET ORAL
Qty: 20 TABLET | Refills: 0 | Status: SHIPPED | OUTPATIENT
Start: 2019-11-14 | End: 2019-11-29 | Stop reason: SDUPTHER

## 2019-11-14 NOTE — TELEPHONE ENCOUNTER
Patient called because she went to the er yesterday thinking she had a blood clot, she didn't but just had swelling  They increased her lasix and wanted her to  Follow up with Bea within a few days  She had a very particular time frame she needed that Bea didn't have openings, offered to get her in with [facility] and she refused and hung up

## 2019-11-14 NOTE — TELEPHONE ENCOUNTER
Left message to call back  lisa maldonadoceled appt call was to follow up and see if she wanted to reschedule?

## 2019-11-29 DIAGNOSIS — M54.50 LUMBAR SPINE PAIN: ICD-10-CM

## 2019-11-29 RX ORDER — CYCLOBENZAPRINE HCL 5 MG
5 TABLET ORAL
Qty: 20 TABLET | Refills: 0 | Status: SHIPPED | OUTPATIENT
Start: 2019-11-29 | End: 2019-12-30 | Stop reason: SDUPTHER

## 2019-12-30 DIAGNOSIS — M54.50 LUMBAR SPINE PAIN: ICD-10-CM

## 2019-12-30 RX ORDER — CYCLOBENZAPRINE HCL 5 MG
5 TABLET ORAL
Qty: 20 TABLET | Refills: 0 | Status: SHIPPED | OUTPATIENT
Start: 2019-12-30 | End: 2020-01-02

## 2020-01-02 ENCOUNTER — OFFICE VISIT (OUTPATIENT)
Dept: FAMILY MEDICINE CLINIC | Facility: CLINIC | Age: 50
End: 2020-01-02
Payer: MEDICARE

## 2020-01-02 VITALS
BODY MASS INDEX: 43.86 KG/M2 | HEART RATE: 76 BPM | HEIGHT: 64 IN | OXYGEN SATURATION: 97 % | SYSTOLIC BLOOD PRESSURE: 140 MMHG | DIASTOLIC BLOOD PRESSURE: 82 MMHG | TEMPERATURE: 97.7 F | WEIGHT: 256.9 LBS

## 2020-01-02 DIAGNOSIS — Z11.59 SCREENING FOR VIRAL DISEASE: ICD-10-CM

## 2020-01-02 DIAGNOSIS — E66.01 CLASS 3 SEVERE OBESITY DUE TO EXCESS CALORIES WITH SERIOUS COMORBIDITY AND BODY MASS INDEX (BMI) OF 40.0 TO 44.9 IN ADULT (HCC): ICD-10-CM

## 2020-01-02 DIAGNOSIS — F25.0 SCHIZOAFFECTIVE DISORDER, BIPOLAR TYPE (HCC): Chronic | ICD-10-CM

## 2020-01-02 DIAGNOSIS — M51.36 DDD (DEGENERATIVE DISC DISEASE), LUMBAR: Primary | ICD-10-CM

## 2020-01-02 DIAGNOSIS — J44.1 CHRONIC OBSTRUCTIVE PULMONARY DISEASE WITH ACUTE EXACERBATION (HCC): ICD-10-CM

## 2020-01-02 DIAGNOSIS — I10 HYPERTENSION, UNSPECIFIED TYPE: ICD-10-CM

## 2020-01-02 PROBLEM — E66.813 CLASS 3 SEVERE OBESITY DUE TO EXCESS CALORIES WITH SERIOUS COMORBIDITY AND BODY MASS INDEX (BMI) OF 40.0 TO 44.9 IN ADULT (HCC): Status: ACTIVE | Noted: 2020-01-02

## 2020-01-02 PROBLEM — E78.2 MIXED HYPERLIPIDEMIA: Status: ACTIVE | Noted: 2019-10-30

## 2020-01-02 PROCEDURE — 99214 OFFICE O/P EST MOD 30 MIN: CPT | Performed by: NURSE PRACTITIONER

## 2020-01-02 RX ORDER — AMLODIPINE BESYLATE 5 MG/1
10 TABLET ORAL DAILY
Start: 2020-01-02 | End: 2020-12-18 | Stop reason: SDUPTHER

## 2020-01-02 RX ORDER — BUPROPION HYDROCHLORIDE 100 MG/1
300 TABLET ORAL EVERY MORNING
COMMUNITY
Start: 2019-11-04 | End: 2020-12-18 | Stop reason: ALTCHOICE

## 2020-01-02 RX ORDER — DICYCLOMINE HYDROCHLORIDE 10 MG/1
CAPSULE ORAL
COMMUNITY
Start: 2018-05-22 | End: 2021-02-17 | Stop reason: ALTCHOICE

## 2020-01-02 RX ORDER — TIZANIDINE 4 MG/1
TABLET ORAL
COMMUNITY
Start: 2018-07-27 | End: 2020-02-12 | Stop reason: ALTCHOICE

## 2020-01-02 RX ORDER — QUETIAPINE FUMARATE 200 MG/1
TABLET, FILM COATED ORAL
COMMUNITY
Start: 2018-07-11 | End: 2020-01-02 | Stop reason: ALTCHOICE

## 2020-01-02 RX ORDER — POTASSIUM CHLORIDE 20 MEQ/1
TABLET, EXTENDED RELEASE ORAL
COMMUNITY
Start: 2018-06-16 | End: 2020-02-12 | Stop reason: ALTCHOICE

## 2020-01-02 RX ORDER — LOSARTAN POTASSIUM 50 MG/1
50 TABLET ORAL DAILY
COMMUNITY
Start: 2018-06-16 | End: 2021-08-28

## 2020-01-02 RX ORDER — SERTRALINE HYDROCHLORIDE 100 MG/1
TABLET, FILM COATED ORAL
COMMUNITY
Start: 2018-07-11 | End: 2020-01-02 | Stop reason: ALTCHOICE

## 2020-01-02 RX ORDER — FUROSEMIDE 20 MG/1
20 TABLET ORAL DAILY
Qty: 30 TABLET | Refills: 3 | Status: SHIPPED | OUTPATIENT
Start: 2020-01-02 | End: 2020-05-05 | Stop reason: SDUPTHER

## 2020-01-02 RX ORDER — AMLODIPINE BESYLATE 10 MG/1
TABLET ORAL
COMMUNITY
Start: 2018-07-10 | End: 2020-01-02 | Stop reason: SDUPTHER

## 2020-01-02 RX ORDER — HYDROCODONE BITARTRATE AND ACETAMINOPHEN 7.5; 325 MG/1; MG/1
TABLET ORAL
COMMUNITY
Start: 2018-06-25 | End: 2020-01-02

## 2020-01-02 NOTE — PROGRESS NOTES
Assessment/Plan:         Diagnoses and all orders for this visit:    DDD (degenerative disc disease), lumbar    Screening for viral disease  -     HIV 1/2 AG-AB combo; Future    Hypertension, unspecified type  -     furosemide (LASIX) 20 mg tablet; Take 1 tablet (20 mg total) by mouth daily  -     metoprolol tartrate (LOPRESSOR) 25 mg tablet; Take 1 tablet (25 mg total) by mouth 2 (two) times a day  -     amLODIPine (NORVASC) 5 mg tablet; Take 2 tablets (10 mg total) by mouth daily    Schizoaffective disorder, bipolar type (HCC)    Chronic obstructive pulmonary disease with acute exacerbation (HCC)    Class 3 severe obesity due to excess calories with serious comorbidity and body mass index (BMI) of 40 0 to 44 9 in [facility])    Other orders  -     Cancel: FLUBLOK: influenza vaccine, quadrivalent, recombinant, PF, 0 5 mL  -     Discontinue: amLODIPine (NORVASC) 10 mg tablet; TAKE 1 TABLET (10 MG TOTAL) BY MOUTH DAILY  -     buPROPion (WELLBUTRIN) 100 mg tablet;  Take 100 mg by mouth every morning  -     dicyclomine (BENTYL) 10 mg capsule; TAKE 1 CAPSULE BY MOUTH EVERY 6 HOURS AS NEEDED FOR ABDOMINAL PAIN  -     Discontinue: HYDROcodone-acetaminophen (NORCO) 7 5-325 mg per tablet; TAKE 1 TO 2 TABLETS EVERY 6 HOURS AS NEEDED FOR SEVERE PAIN; MAX 8 PER DAY  -     hydrocortisone (PROCTO-MED HC) 2 5 % rectal cream; 1 APPLICATON TWICE A DAY FOR 14 DAYS WITH APPLICATOR FOR HEMORRHOIDS  -     potassium chloride (K-DUR,KLOR-CON) 20 mEq tablet  -     Discontinue: QUEtiapine (SEROquel) 200 mg tablet; TAKE 1 TABLET BY MOUTH AT BEDTIME FOR 7 DAYS  -     Discontinue: sertraline (ZOLOFT) 100 mg tablet; TAKE 1 TABLET BY MOUTH DAILY FOR 7 DAYS  -     Discontinue: sertraline (ZOLOFT) 50 mg tablet; TAKE 1 TABLET BY MOUTH DAILY FOR 7 DAYS  -     tiZANidine (ZANAFLEX) 4 mg tablet; TAKE 1 TABLET (4 MG TOTAL) BY MOUTH 3 (THREE) TIMES A DAY AS NEEDED FOR MUSCLE SPASMS  -     losartan (COZAAR) 50 mg tablet  -     Discontinue: lurasidone (LATUDA) 40 mg tablet; TAKE 1 TABLET BY MOUTH DAILY FOR 7 DAYS(THEN INCREASE TO LATUDA 60)  -     Discontinue: Lurasidone HCl (LATUDA) 60 MG TABS; TAKE 1 TABLET BY MOUTH DAILY FOR 7 DAYS  -     Discontinue: lurasidone (LATUDA) 80 mg tablet; TAKE 1 TABLET BY MOUTH IN THE MORNING WITH FOOD FOR 30 DAYS          Subjective:      Patient ID: Melissa Cisneros is a 51 y o  female  HPI  Here for have forms filled out to appeal for the [employer] and also for keeping her cat in her apartment     Needs transportation to work and appointment  Walking with a cane and after standing or walking too long loses balance  Seeing new psych tomorrow  Seeing cardiology and had recent appt   Due in 1 year        The following portions of the patient's history were reviewed and updated as appropriate: allergies, current medications, past family history, past medical history, past social history, past surgical history and problem list     Review of Systems   Constitutional: Negative for activity change, appetite change and fatigue  HENT: Negative for congestion and sore throat  Respiratory: Negative for cough  Cardiovascular: Negative for chest pain  Gastrointestinal: Negative for diarrhea  Musculoskeletal: Positive for back pain  Skin: Negative for rash  Neurological: Negative for dizziness and light-headedness  Objective:  Vitals:    01/02/20 1307   BP: 140/82   Pulse: 76   Temp: 97 7 °F (36 5 °C)   TempSrc: Temporal   SpO2: 97%   Weight: 117 kg (256 lb 14 4 oz)   Height: 5' 4" (1 626 m)      Physical Exam   Constitutional: She appears well-developed and well-nourished  Cardiovascular: Normal rate, regular rhythm and normal heart sounds  Pulmonary/Chest: Effort normal and breath sounds normal    Musculoskeletal: She exhibits no edema  Skin: Skin is warm  Vitals reviewed  BMI Counseling: Body mass index is 44 1 kg/m²   The BMI is above normal  Nutrition recommendations include reducing portion sizes, decreasing overall calorie intake, 3-5 servings of fruits/vegetables daily, reducing fast food intake, consuming healthier snacks, decreasing soda and/or juice intake and moderation in carbohydrate intake  Exercise recommendations include exercising 3-5 times per week

## 2020-01-02 NOTE — LETTER
To Javier  1970    1/2/2020    To Whom It May concern:    Brianne Phan is under the care of psychiatry  She has had ongoing mental health issues for years  She has an upcoming appointment with psychiatry tomorrow  Her diagnosis are: bipolar disorder, depression and anxiety  While she is independent in her living ability, she benefits from the company and emotional support of her therapy cat  The cat helps her cope with her anxiety and helps her to relax and not harm herself  The cat is a service animal and has benefit to her disabilities  Due to the cat being present please allow her to be aware before entering her apartment as well as service animals are not to be petted and distracted  Thank you for your understanding in this matter              Rosalinda Lynn

## 2020-01-03 ENCOUNTER — APPOINTMENT (OUTPATIENT)
Dept: LAB | Age: 50
End: 2020-01-03
Payer: MEDICARE

## 2020-01-03 ENCOUNTER — TRANSCRIBE ORDERS (OUTPATIENT)
Dept: ADMINISTRATIVE | Age: 50
End: 2020-01-03

## 2020-01-03 ENCOUNTER — HOSPITAL ENCOUNTER (OUTPATIENT)
Dept: RADIOLOGY | Age: 50
Discharge: HOME | End: 2020-01-03
Payer: MEDICARE

## 2020-01-03 VITALS — WEIGHT: 256 LBS | BODY MASS INDEX: 43.71 KG/M2 | HEIGHT: 64 IN

## 2020-01-03 DIAGNOSIS — Z11.59 SCREENING FOR VIRAL DISEASE: ICD-10-CM

## 2020-01-03 DIAGNOSIS — E78.2 MIXED HYPERLIPIDEMIA: ICD-10-CM

## 2020-01-03 DIAGNOSIS — Z12.31 ENCOUNTER FOR SCREENING MAMMOGRAM FOR MALIGNANT NEOPLASM OF BREAST: ICD-10-CM

## 2020-01-03 DIAGNOSIS — E78.2 MIXED HYPERLIPIDEMIA: Primary | ICD-10-CM

## 2020-01-03 LAB
CHOLEST SERPL-MCNC: 201 MG/DL (ref 50–200)
HDLC SERPL-MCNC: 62 MG/DL
LDLC SERPL CALC-MCNC: 106 MG/DL (ref 0–100)
NONHDLC SERPL-MCNC: 139 MG/DL
TRIGL SERPL-MCNC: 165 MG/DL

## 2020-01-03 PROCEDURE — 87389 HIV-1 AG W/HIV-1&-2 AB AG IA: CPT

## 2020-01-03 PROCEDURE — 77067 SCR MAMMO BI INCL CAD: CPT

## 2020-01-03 PROCEDURE — 80061 LIPID PANEL: CPT

## 2020-01-03 PROCEDURE — 36415 COLL VENOUS BLD VENIPUNCTURE: CPT

## 2020-01-05 LAB — HIV 1+2 AB+HIV1 P24 AG SERPL QL IA: NORMAL

## 2020-01-14 DIAGNOSIS — M54.50 LUMBAR SPINE PAIN: ICD-10-CM

## 2020-01-14 RX ORDER — CYCLOBENZAPRINE HCL 5 MG
5 TABLET ORAL
Qty: 20 TABLET | Refills: 0 | Status: SHIPPED | OUTPATIENT
Start: 2020-01-14 | End: 2020-02-12 | Stop reason: ALTCHOICE

## 2020-02-11 DIAGNOSIS — Z23 NEED FOR VACCINATION: Primary | ICD-10-CM

## 2020-02-12 ENCOUNTER — OFFICE VISIT (OUTPATIENT)
Dept: FAMILY MEDICINE CLINIC | Facility: CLINIC | Age: 50
End: 2020-02-12
Payer: MEDICARE

## 2020-02-12 ENCOUNTER — TELEPHONE (OUTPATIENT)
Dept: FAMILY MEDICINE CLINIC | Facility: CLINIC | Age: 50
End: 2020-02-12

## 2020-02-12 VITALS
TEMPERATURE: 97.6 F | RESPIRATION RATE: 18 BRPM | WEIGHT: 252.5 LBS | HEIGHT: 64 IN | OXYGEN SATURATION: 98 % | HEART RATE: 71 BPM | BODY MASS INDEX: 43.11 KG/M2 | DIASTOLIC BLOOD PRESSURE: 94 MMHG | SYSTOLIC BLOOD PRESSURE: 146 MMHG

## 2020-02-12 DIAGNOSIS — M25.552 LEFT HIP PAIN: Primary | ICD-10-CM

## 2020-02-12 DIAGNOSIS — H61.22 IMPACTED CERUMEN OF LEFT EAR: ICD-10-CM

## 2020-02-12 DIAGNOSIS — M35.00 SJOGREN'S SYNDROME, WITH UNSPECIFIED ORGAN INVOLVEMENT (HCC): ICD-10-CM

## 2020-02-12 PROCEDURE — 69210 REMOVE IMPACTED EAR WAX UNI: CPT | Performed by: NURSE PRACTITIONER

## 2020-02-12 PROCEDURE — 3077F SYST BP >= 140 MM HG: CPT | Performed by: NURSE PRACTITIONER

## 2020-02-12 PROCEDURE — 99214 OFFICE O/P EST MOD 30 MIN: CPT | Performed by: NURSE PRACTITIONER

## 2020-02-12 PROCEDURE — 3008F BODY MASS INDEX DOCD: CPT | Performed by: NURSE PRACTITIONER

## 2020-02-12 PROCEDURE — 1036F TOBACCO NON-USER: CPT | Performed by: NURSE PRACTITIONER

## 2020-02-12 PROCEDURE — 3080F DIAST BP >= 90 MM HG: CPT | Performed by: NURSE PRACTITIONER

## 2020-02-12 NOTE — PROGRESS NOTES
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  Using a cane  Needs a note to have water at work   still after lanta  Appealing that and trying to get  Ears feel blocked at times  Seeing psychiatry   Was cutting again      The following portions of the patient's history were reviewed and updated as appropriate: allergies, current medications, past family history, past medical history, past social history, past surgical history and problem list     Review of Systems   Constitutional: Negative for activity change, appetite change, chills, fatigue and fever  HENT: Positive for ear pain  Negative for congestion, postnasal drip, rhinorrhea and sore throat  Respiratory: Negative for cough  Musculoskeletal: Positive for arthralgias, back pain and gait problem  Neurological: Negative for dizziness, light-headedness and headaches  Objective:  Vitals:    02/12/20 0914   BP: 146/94   BP Location: Left arm   Patient Position: Sitting   Cuff Size: Large   Pulse: 71   Resp: 18   Temp: 97 6 °F (36 4 °C)   TempSrc: Temporal   SpO2: 98%   Weight: 115 kg (252 lb 8 oz)   Height: 5' 4" (1 626 m)      Physical Exam   Constitutional: She is oriented to person, place, and time  She appears well-developed and well-nourished     HENT:   Right Ear: Hearing, tympanic membrane, external ear and ear canal normal    Left Ear: Hearing and external ear normal    Nose: Nose normal    Mouth/Throat: Oropharynx is clear and moist  Left ear blocked with cerumen   Eyes: Conjunctivae are normal    Neck: Neck supple  Cardiovascular: Normal rate, regular rhythm and normal heart sounds  Pulmonary/Chest: Effort normal and breath sounds normal    Abdominal: Soft  Bowel sounds are normal    Musculoskeletal:        Left hip: She exhibits decreased range of motion, decreased strength and tenderness  Legs:  Lymphadenopathy:     She has no cervical adenopathy  Neurological: She is alert and oriented to person, place, and time  Skin: Skin is warm  Vitals reviewed  Ear cerumen removal  Date/Time: 2/12/2020 9:41 AM  Performed by: JHONATAN Hauser  Authorized by: JHONATAN Hauser     Patient location:  Other (comment)  Other Assisting Provider: No    Consent:     Consent obtained:  Verbal    Consent given by:  Patient    Risks discussed:  Bleeding, dizziness, infection and pain    Alternatives discussed:  No treatment  Universal protocol:     Procedure explained and questions answered to patient or proxy's satisfaction: yes    Procedure details:     Local anesthetic:  None    Location:  L ear    Procedure type: irrigation with instrumentation      Instrumentation: curette      Approach:  External  Post-procedure details:     Complication:  None    Hearing quality:  Improved    Patient tolerance of procedure:   Tolerated well, no immediate complications

## 2020-02-12 NOTE — LETTER
February 12, 2020     Patient: Maddison Cerna   YOB: 1970   Date of Visit: 2/12/2020       To Whom it May Concern:    Katharine Harada is under my professional care  She was seen in my office on 2/12/2020  Please allow her to have a water bottle by her work station  She has a medical condition that requires this  If you have any questions or concerns, please don't hesitate to call           Sincerely,          JHONATAN Carpenter        CC: No Recipients

## 2020-02-12 NOTE — LETTER
Lexis Smithmichelle  1970    [employer] has been a patient of mine for years  She has several medical conditions that make it difficulty for her to take public transportation  She does not drive at this time  Her medical conditions that make it difficult are: degenerative disc disease of the lumbar spine, spondylosis of the lumbar-sacral spine and recently and under evaluation is pain of the left hip and pelvis area  She has difficulty standing for short periods and walking any distance  She does use a cane  Thank your for your understanding in this matter      Sincerely,         Misty Eric

## 2020-02-17 ENCOUNTER — APPOINTMENT (OUTPATIENT)
Dept: RADIOLOGY | Age: 50
End: 2020-02-17
Payer: MEDICARE

## 2020-02-17 DIAGNOSIS — M25.552 LEFT HIP PAIN: ICD-10-CM

## 2020-02-17 PROCEDURE — 73502 X-RAY EXAM HIP UNI 2-3 VIEWS: CPT

## 2020-02-21 ENCOUNTER — TELEPHONE (OUTPATIENT)
Dept: FAMILY MEDICINE CLINIC | Facility: CLINIC | Age: 50
End: 2020-02-21
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
## 2020-03-06 ENCOUNTER — HOSPITAL ENCOUNTER (EMERGENCY)
Facility: HOSPITAL | Age: 50
Discharge: HOME | End: 2020-03-06
Attending: EMERGENCY MEDICINE | Admitting: EMERGENCY MEDICINE
Payer: MEDICARE

## 2020-03-06 VITALS
WEIGHT: 250 LBS | HEIGHT: 66 IN | TEMPERATURE: 98 F | SYSTOLIC BLOOD PRESSURE: 130 MMHG | DIASTOLIC BLOOD PRESSURE: 66 MMHG | BODY MASS INDEX: 40.18 KG/M2 | HEART RATE: 72 BPM | OXYGEN SATURATION: 96 % | RESPIRATION RATE: 18 BRPM

## 2020-03-06 DIAGNOSIS — M25.561 ACUTE PAIN OF RIGHT KNEE: Primary | ICD-10-CM

## 2020-03-06 PROCEDURE — 99283 EMERGENCY DEPT VISIT LOW MDM: CPT

## 2020-03-06 PROCEDURE — 99284 EMERGENCY DEPT VISIT MOD MDM: CPT | Performed by: PHYSICIAN ASSISTANT

## 2020-03-06 RX ORDER — HYDROXYZINE HYDROCHLORIDE 10 MG/1
10 TABLET, FILM COATED ORAL EVERY 6 HOURS PRN
COMMUNITY
End: 2021-02-17 | Stop reason: ALTCHOICE

## 2020-03-06 NOTE — ED PROVIDER NOTES
History  Chief Complaint   Patient presents with    Knee Pain     Patient states that she needs a knee replacement in her right knee; states that yesterday she was getting off the bus and took too big of a step and her right knee started hurting; no fall      52-year-old female presents emergency room for evaluation of right knee pain  Onset yesterday around 7:00 p m  as she was stepping up onto the bus  States she felt a pop or pull and could not continue  Patient has a known history of very bad arthritis in her knees for which she was trying to hold off on knee replacement  In the past she has had injections  Denies redness  Admits to some swelling  Denies fever or rash  Able to walk with pain  Pain is worse with bending  States she hears or feels a pop when she bends it fully  Denies hip or ankle pain  Denies other injury  No self-treatment  Pain does not radiate  Pain is worse with movement          Prior to Admission Medications   Prescriptions Last Dose Informant Patient Reported? Taking?    TOVIAZ 4 MG TB24  Self No Yes   Sig: Take 1 tablet (4 mg total) by mouth daily   amLODIPine (NORVASC) 5 mg tablet  Self No Yes   Sig: Take 2 tablets (10 mg total) by mouth daily   buPROPion (WELLBUTRIN) 100 mg tablet  Self Yes Yes   Sig: Take 100 mg by mouth every morning   colestipol (COLESTID) 1 g tablet  Self No Yes   Sig: Take 1 tablet (1 g total) by mouth 2 (two) times a day   dicyclomine (BENTYL) 10 mg capsule  Self Yes Yes   Sig: TAKE 1 CAPSULE BY MOUTH EVERY 6 HOURS AS NEEDED FOR ABDOMINAL PAIN   divalproex sodium (DEPAKOTE) 250 mg EC tablet  Self No Yes   Sig: Take 3 tablets (750 mg total) by mouth 2 (two) times a day   estradiol (ESTRACE) 1 mg tablet  Self Yes Yes   Sig: Take 1 mg by mouth daily   furosemide (LASIX) 20 mg tablet  Self No No   Sig: Take 1 tablet (20 mg total) by mouth daily   hydrOXYzine HCL (ATARAX) 10 mg tablet   Yes Yes   Sig: Take 10 mg by mouth every 6 (six) hours as needed for itching   losartan (COZAAR) 50 mg tablet  Self Yes Yes   Sig: Take 50 mg by mouth daily    lurasidone (LATUDA) 80 mg tablet  Self Yes Yes   Sig: Take 20 mg by mouth daily with breakfast   metoprolol tartrate (LOPRESSOR) 25 mg tablet  Self No Yes   Sig: Take 1 tablet (25 mg total) by mouth 2 (two) times a day   pantoprazole (PROTONIX) 40 mg tablet  Self Yes Yes   Sig: Take 40 mg by mouth 2 (two) times a day   pilocarpine (SALAGEN) 5 mg tablet  Self No Yes   Sig: Take 1 tablet (5 mg total) by mouth 3 (three) times a day   topiramate (TOPAMAX) 100 mg tablet  Self Yes Yes   Sig: Take 100 mg by mouth 2 (two) times a day   traZODone (DESYREL) 100 mg tablet  Self No No   Sig: Take 2 tablets (200 mg total) by mouth daily at bedtime for 212 days      Facility-Administered Medications: None       Past Medical History:   Diagnosis Date    Anxiety     Arthritis     oa cassandra knees    Asthma     good control- no medications    Yan's esophagus     Bipolar affective disorder (HCC)     Breathing difficulty     After vascular surgery was diagnosed with sleep apnea    Chronic pain disorder     lumbar    CPAP (continuous positive airway pressure) dependence     Degenerative disc disease at L5-S1 level     Deliberate self-cutting     Depression 09/16/2008    Disease of thyroid gland     hypo    MARTINEZ (dyspnea on exertion)     Drug overdose 10/28/2008    suicide attempt    Dysphagia     Dyspnea     Edema     BLE    GERD (gastroesophageal reflux disease)     High blood pressure     Knee pain, bilateral     Especially right    Migraines     Obese     Overactive bladder     Sjogren's disease ([address] )     Sleep apnea     Stress incontinence     Suicidal ideations     Umbilical hernia     surgical repair today 4/24/2019    Use of cane as ambulatory aid     awaiting b/l knee replacement    Wears glasses        Past Surgical History:   Procedure Laterality Date    BREAST BIOPSY Right 1989    benign    CARPAL TUNNEL RELEASE Left     CHOLECYSTECTOMY  2003    Laparoscopic    COLONOSCOPY      2009    DILATION AND CURETTAGE OF UTERUS      ELBOW SURGERY Left     x2  No hardware    ESOPHAGOGASTRODUODENOSCOPY      FOOT SURGERY Left     Plantar fasciotomy    HYSTERECTOMY      laporoscopic, partial    KNEE ARTHROSCOPY Bilateral     OOPHORECTOMY Left 10/2015    IA ANAL SPHINCTEROTOMY N/A 2018    Procedure: EUA, LEFT PARTIAL INTERNAL SPHINCTEROTOMY;  Surgeon: Kaila Zelaya MD;  Location: [address] [place] [address] MAIN OR;  Service: Colorectal    IA REPAIR UMBILICAL WASW,9+W/W,PQPAG N/A 2019    Procedure: REPAIR HERNIA UMBILICAL LAPAROSCOPIC W/ ROBOTICS;  Surgeon: Nikia Valdez MD;  [facility];  Service: General    REDUCTION MAMMAPLASTY Bilateral 1999    REPAIR LACERATION Left     left hand-2009    TONSILECTOMY AND ADNOIDECTOMY      TONSILLECTOMY      VEIN LIGATION Bilateral     WISDOM TOOTH EXTRACTION         Family History   Problem Relation Age of Onset    Kidney cancer Mother     Diabetes Mother     Colon cancer Family     No Known Problems Father     No Known Problems Sister     Colon cancer Paternal Uncle     Colon cancer Maternal Uncle     Colon cancer Maternal Uncle      I have reviewed and agree with the history as documented  E-Cigarette/Vaping    E-Cigarette Use Never User      E-Cigarette/Vaping Substances    Nicotine No     THC No     CBD No     Flavoring No     Other No     Unknown No      Social History     Tobacco Use    Smoking status: Former Smoker     Packs/day: 3 00     Types: Cigarettes     Last attempt to quit: 2018     Years since quittin 1    Smokeless tobacco: Never Used    Tobacco comment: Started at age 14     Substance Use Topics    Alcohol use: Not Currently     Comment: Recovering alcoholic    Drug use: Yes     Types: Marijuana     Comment: drug overdose intentional-ambien and risperidal  Occasional marijuana       Review of Systems   Constitutional: Negative for chills and fever  Respiratory: Negative for shortness of breath  Cardiovascular: Negative for chest pain  Musculoskeletal: Positive for joint swelling  Skin: Negative for rash and wound  Neurological: Negative for weakness and numbness  Physical Exam  Physical Exam   Constitutional: She appears well-developed and well-nourished  Cardiovascular: Intact distal pulses  Musculoskeletal: She exhibits no edema  Right hip: Normal         Right knee: She exhibits decreased range of motion and swelling (mild)  She exhibits no effusion, no ecchymosis, no deformity, no erythema, no LCL laxity, normal patellar mobility and no MCL laxity  Tenderness (posterior) found  Right ankle: Normal    Negative Lachman's test, unable to perform complete Dl test secondary to pain  No calf tenderness   Skin: Skin is warm and dry  Nursing note and vitals reviewed  Vital Signs  ED Triage Vitals [03/06/20 1151]   Temperature Pulse Respirations Blood Pressure SpO2   98 °F (36 7 °C) 72 18 130/66 96 %      Temp Source Heart Rate Source Patient Position - Orthostatic VS BP Location FiO2 (%)   Oral Monitor Sitting Right arm --      Pain Score       9           Vitals:    03/06/20 1151   BP: 130/66   Pulse: 72   Patient Position - Orthostatic VS: Sitting         Visual Acuity      ED Medications  Medications - No data to display    Diagnostic Studies  Results Reviewed     None                 No orders to display              Procedures  Procedures     Ace wrap to right knee applied by myself, neuro vasculature intact status post application      ED Course                               MDM  Number of Diagnoses or Management Options  Acute pain of right knee:      Amount and/or Complexity of Data Reviewed  Decide to obtain previous medical records or to obtain history from someone other than the patient: yes (X-rays in October 2019 of the right knee did demonstrate tricompartmental arthritis)    Risk of Complications, Morbidity, and/or Mortality  General comments: Differential diagnosis includes but is not limited to:  Sprain, strain, osteoarthritis flare, doubt fracture given patient did not fall and is able to walk    Discussed with patient use of Tylenol, Ace wrap, ice, use of anti-inflammatory cream  Discussed importance of follow-up with Orthopedics she understands and agrees to do so  Patient Progress  Patient progress: stable        Disposition  Final diagnoses:   Acute pain of right knee     Time reflects when diagnosis was documented in both MDM as applicable and the Disposition within this note     Time User Action Codes Description Comment    3/6/2020 12:27 PM Alcon Ericka Add [[id number] [id number]] Acute pain of right knee       ED Disposition     ED Disposition Condition Date/Time Comment    Discharge Stable Fri Mar 6, 2020 12:27  [facility] discharge to home/self care  Follow-up Information     Follow up With Specialties Details Why Contact Info Additional Information    [address] Orthopedic Surgery In 3 days  [address] [id number]  [phone] [address] [place] [address], [address] [place] [address], [address]    [address] Emergency Department Emergency Medicine  If symptoms worsen [address]  [phone]  ED, [address], [address]   [phone]          Patient's Medications   Discharge Prescriptions    DICLOFENAC SODIUM (VOLTAREN) 1 %    Apply 4 g topically 4 (four) times a day for 10 days Right knee       Start Date: 3/6/2020  End Date: 3/16/2020       Order Dose: 4 g       Quantity: 200 g    Refills: 0     No discharge procedures on file      PDMP Review     None          ED Provider  Electronically Signed by           Annmarie Cali PA-C  03/06/20 5936

## 2020-03-16 ENCOUNTER — HOSPITAL ENCOUNTER (OUTPATIENT)
Dept: RADIOLOGY | Facility: HOSPITAL | Age: 50
Discharge: HOME | End: 2020-03-16
Payer: MEDICARE

## 2020-03-16 ENCOUNTER — OFFICE VISIT (OUTPATIENT)
Dept: OBGYN CLINIC | Facility: HOSPITAL | Age: 50
End: 2020-03-16
Payer: MEDICARE

## 2020-03-16 VITALS
HEIGHT: 66 IN | SYSTOLIC BLOOD PRESSURE: 120 MMHG | HEART RATE: 72 BPM | DIASTOLIC BLOOD PRESSURE: 83 MMHG | BODY MASS INDEX: 41.46 KG/M2 | WEIGHT: 258 LBS

## 2020-03-16 DIAGNOSIS — M17.11 PRIMARY OSTEOARTHRITIS OF RIGHT KNEE: ICD-10-CM

## 2020-03-16 DIAGNOSIS — M25.561 ACUTE PAIN OF RIGHT KNEE: ICD-10-CM

## 2020-03-16 DIAGNOSIS — M25.561 ACUTE PAIN OF RIGHT KNEE: Primary | ICD-10-CM

## 2020-03-16 PROCEDURE — 3079F DIAST BP 80-89 MM HG: CPT | Performed by: PHYSICIAN ASSISTANT

## 2020-03-16 PROCEDURE — 99203 OFFICE O/P NEW LOW 30 MIN: CPT | Performed by: PHYSICIAN ASSISTANT

## 2020-03-16 PROCEDURE — 20610 DRAIN/INJ JOINT/BURSA W/O US: CPT | Performed by: PHYSICIAN ASSISTANT

## 2020-03-16 PROCEDURE — 73560 X-RAY EXAM OF KNEE 1 OR 2: CPT

## 2020-03-16 PROCEDURE — 1036F TOBACCO NON-USER: CPT | Performed by: PHYSICIAN ASSISTANT

## 2020-03-16 PROCEDURE — 3074F SYST BP LT 130 MM HG: CPT | Performed by: PHYSICIAN ASSISTANT

## 2020-03-16 PROCEDURE — 3008F BODY MASS INDEX DOCD: CPT | Performed by: PHYSICIAN ASSISTANT

## 2020-03-16 RX ORDER — TRIAMCINOLONE ACETONIDE 40 MG/ML
80 INJECTION, SUSPENSION INTRA-ARTICULAR; INTRAMUSCULAR
Status: COMPLETED | OUTPATIENT
Start: 2020-03-16 | End: 2020-03-16

## 2020-03-16 RX ORDER — LIDOCAINE HYDROCHLORIDE 10 MG/ML
2 INJECTION, SOLUTION INFILTRATION; PERINEURAL
Status: COMPLETED | OUTPATIENT
Start: 2020-03-16 | End: 2020-03-16

## 2020-03-16 RX ORDER — BUPIVACAINE HYDROCHLORIDE 5 MG/ML
2 INJECTION, SOLUTION EPIDURAL; INTRACAUDAL
Status: COMPLETED | OUTPATIENT
Start: 2020-03-16 | End: 2020-03-16

## 2020-03-16 RX ADMIN — LIDOCAINE HYDROCHLORIDE 2 ML: 10 INJECTION, SOLUTION INFILTRATION; PERINEURAL at 14:43

## 2020-03-16 RX ADMIN — TRIAMCINOLONE ACETONIDE 80 MG: 40 INJECTION, SUSPENSION INTRA-ARTICULAR; INTRAMUSCULAR at 14:43

## 2020-03-16 RX ADMIN — BUPIVACAINE HYDROCHLORIDE 2 ML: 5 INJECTION, SOLUTION EPIDURAL; INTRACAUDAL at 14:43

## 2020-03-16 NOTE — PROGRESS NOTES
Assessment/Plan   Diagnoses and all orders for this visit:    Acute pain of right knee  -     Cancel: XR knee 3 vw right non injury; Future    Primary osteoarthritis of right knee  - Cortisone injection today  - Ice as needed  - Follow up with Dr James Harvey or Dr Vijay Farfan   Patient ID: Jessi Kat is a 51 y o  female  Vitals:    03/16/20 1354   BP: 120/83   Pulse: 67     50yo female comes in for an evaluation of her right knee  She "tweaked" it while getting on the bus a week ago  She has had ongoing knee pain from osteoarthritis and occasionally gets cortisone injections for this  The pain is dull in character, mild in severity, pain does not radiate and is not associated with numbness  She used to see someone at [facility] but is now transferring so all her specialists are from [place] [address]  Her BMI is 41 but she has been diligent about her diet and has already lost 100 lbs          The following portions of the patient's history were reviewed and updated as appropriate: allergies, current medications, past family history, past medical history, past social history, past surgical history and problem list     Review of Systems  Ortho Exam  Past Medical History:   Diagnosis Date    Anxiety     Arthritis     oa cassandra knees    Asthma     good control- no medications    Yan's esophagus     Bipolar affective disorder ([address] )     Breathing difficulty     After vascular surgery was diagnosed with sleep apnea    Chronic pain disorder     lumbar    CPAP (continuous positive airway pressure) dependence     Degenerative disc disease at L5-S1 level     Deliberate self-cutting     Depression 09/16/2008    Disease of thyroid gland     hypo    MARTINEZ (dyspnea on exertion)     Drug overdose 10/28/2008    suicide attempt    Dysphagia     Dyspnea     Edema     BLE    GERD (gastroesophageal reflux disease)     High blood pressure     Knee pain, bilateral     Especially right    Migraines     Obese  Overactive bladder     [address]ogren's disease [address] [address]     Sleep apnea     Stress incontinence     Suicidal ideations     Umbilical hernia     surgical repair today 2019    Use of cane as ambulatory aid     awaiting b/l knee replacement    Wears glasses      Past Surgical History:   Procedure Laterality Date    BREAST BIOPSY Right     benign    CARPAL TUNNEL RELEASE Left     CHOLECYSTECTOMY  2003    Laparoscopic    COLONOSCOPY      2009    DILATION AND CURETTAGE OF UTERUS      ELBOW SURGERY Left     x2  No hardware    ESOPHAGOGASTRODUODENOSCOPY      FOOT SURGERY Left     Plantar fasciotomy    HYSTERECTOMY      laporoscopic, partial    KNEE ARTHROSCOPY Bilateral     OOPHORECTOMY Left 10/2015    KS ANAL SPHINCTEROTOMY N/A 2018    Procedure: EUA, LEFT PARTIAL INTERNAL SPHINCTEROTOMY;  Surgeon: Jamie Morelos MD;  Location: [address] [place] [address] MAIN OR;  Service: Colorectal    KS REPAIR UMBILICAL BIDG,6+P/C,PUVJU N/A 2019    Procedure: REPAIR HERNIA UMBILICAL LAPAROSCOPIC W/ ROBOTICS;  Surgeon: Stephanie Church MD;  [facility];  Service: General    REDUCTION MAMMAPLASTY Bilateral 1999    REPAIR LACERATION Left     left hand-2009    TONSILECTOMY AND ADNOIDECTOMY      TONSILLECTOMY      VEIN LIGATION Bilateral     WISDOM TOOTH EXTRACTION       Family History   Problem Relation Age of Onset    Kidney cancer Mother     Diabetes Mother     Colon cancer Family     No Known Problems Father     No Known Problems Sister     Colon cancer Paternal Uncle     Colon cancer Maternal Uncle     Colon cancer Maternal Uncle      Social History     Occupational History    Not on file   Tobacco Use    Smoking status: Former Smoker     Packs/day: 3 00     Types: Cigarettes     Last attempt to quit: 2018     Years since quittin 2    Smokeless tobacco: Never Used    Tobacco comment: Started at age 14     Substance and Sexual Activity    Alcohol use: Not Currently     Comment: Recovering alcoholic    Drug use: Yes     Types: Marijuana     Comment: drug overdose intentional-ambien and risperidal  Occasional marijuana    Sexual activity: Not Currently       Review of Systems   Constitutional: Negative  HENT: Negative  Eyes: Negative  Respiratory: Negative  Cardiovascular: Negative  Gastrointestinal: Negative  Endocrine: Negative  Genitourinary: Negative  Musculoskeletal: As below      Allergic/Immunologic: Negative  Neurological: Negative  Hematological: Negative  Psychiatric/Behavioral: Negative  Objective   Physical Exam      · Constitutional: Awake, Alert, Oriented  · Eyes: EOMI  · Psych: Mood and affect appropriate  · Heart: regular rate and rhythm  · Lungs: No audible wheezing  · Abdomen: soft  · Lymph: no lymphedema   right Knee:  - Appearance   No swelling, discoloration, deformity, or ecchymosis  - Effusion   none  - Palpation   No tenderness about the medial / lateral joint line, patella, patellar tendon, MCL, LCL, hamstrings, or medial / lateral tibial plateau   - ROM   Extension: 0 and Flexion: 100  - Special Tests   Dl's Test negative, Lachman's Test negative, Anterior Drawer Test negative, Posterior Drawer Test negative, Valgus Stress Test negative, Varus Stress Test negative and Patellar apprehension negative  - Motor   normal 5/5 in all planes  - NVI distally    I have personally reviewed pertinent films in PACS and my interpretation is bone on bone medial joint space      Large joint arthrocentesis: R knee  Date/Time: 3/16/2020 2:43 PM  Consent given by: patient  Site marked: site marked  Timeout: Immediately prior to procedure a time out was called to verify the correct patient, procedure, equipment, support staff and site/side marked as required   Supporting Documentation  Indications: pain   Procedure Details  Location: knee - R knee  Needle size: 22 G  Ultrasound guidance: no  Approach: lateral  Medications administered: 2 mL bupivacaine (PF) 0 5 %; 2 mL lidocaine 1 %; 80 mg triamcinolone acetonide 40 mg/mL    Patient tolerance: patient tolerated the procedure well with no immediate complications  Dressing:  Sterile dressing applied

## 2020-04-22 ENCOUNTER — TELEPHONE (OUTPATIENT)
Dept: FAMILY MEDICINE CLINIC | Facility: CLINIC | Age: 50
End: 2020-04-22

## 2020-05-05 ENCOUNTER — TELEMEDICINE (OUTPATIENT)
Dept: FAMILY MEDICINE CLINIC | Facility: CLINIC | Age: 50
End: 2020-05-05
Payer: MEDICARE

## 2020-05-05 DIAGNOSIS — I10 HYPERTENSION, UNSPECIFIED TYPE: ICD-10-CM

## 2020-05-05 DIAGNOSIS — H10.32 ACUTE CONJUNCTIVITIS OF LEFT EYE, UNSPECIFIED ACUTE CONJUNCTIVITIS TYPE: Primary | ICD-10-CM

## 2020-05-05 PROCEDURE — 99214 OFFICE O/P EST MOD 30 MIN: CPT | Performed by: NURSE PRACTITIONER

## 2020-05-05 RX ORDER — TOBRAMYCIN 3 MG/ML
1 SOLUTION/ DROPS OPHTHALMIC
Qty: 5 ML | Refills: 0 | Status: SHIPPED | OUTPATIENT
Start: 2020-05-05 | End: 2020-05-07

## 2020-05-05 RX ORDER — FUROSEMIDE 20 MG/1
20 TABLET ORAL DAILY
Qty: 30 TABLET | Refills: 3 | Status: SHIPPED | OUTPATIENT
Start: 2020-05-05 | End: 2020-08-24 | Stop reason: SDUPTHER

## 2020-05-07 ENCOUNTER — OFFICE VISIT (OUTPATIENT)
Dept: FAMILY MEDICINE CLINIC | Facility: CLINIC | Age: 50
End: 2020-05-07
Payer: MEDICARE

## 2020-05-07 VITALS
WEIGHT: 260 LBS | DIASTOLIC BLOOD PRESSURE: 80 MMHG | BODY MASS INDEX: 41.78 KG/M2 | HEART RATE: 86 BPM | HEIGHT: 66 IN | OXYGEN SATURATION: 98 % | SYSTOLIC BLOOD PRESSURE: 124 MMHG

## 2020-05-07 DIAGNOSIS — H57.12 EYE PAIN, LEFT: ICD-10-CM

## 2020-05-07 DIAGNOSIS — H00.036 CELLULITIS OF LEFT EYELID: Primary | ICD-10-CM

## 2020-05-07 PROCEDURE — 3008F BODY MASS INDEX DOCD: CPT | Performed by: NURSE PRACTITIONER

## 2020-05-07 PROCEDURE — 1036F TOBACCO NON-USER: CPT | Performed by: NURSE PRACTITIONER

## 2020-05-07 PROCEDURE — 99213 OFFICE O/P EST LOW 20 MIN: CPT | Performed by: NURSE PRACTITIONER

## 2020-05-07 PROCEDURE — 3074F SYST BP LT 130 MM HG: CPT | Performed by: NURSE PRACTITIONER

## 2020-05-07 PROCEDURE — 3079F DIAST BP 80-89 MM HG: CPT | Performed by: NURSE PRACTITIONER

## 2020-05-07 RX ORDER — CEPHALEXIN 500 MG/1
500 CAPSULE ORAL EVERY 6 HOURS SCHEDULED
Qty: 28 CAPSULE | Refills: 0 | Status: SHIPPED | OUTPATIENT
Start: 2020-05-07 | End: 2020-05-14

## 2020-05-12 ENCOUNTER — TELEPHONE (OUTPATIENT)
Dept: FAMILY MEDICINE CLINIC | Facility: CLINIC | Age: 50
End: 2020-05-12

## 2020-06-04 ENCOUNTER — OFFICE VISIT (OUTPATIENT)
Dept: FAMILY MEDICINE CLINIC | Facility: CLINIC | Age: 50
End: 2020-06-04
Payer: MEDICARE

## 2020-06-04 VITALS
RESPIRATION RATE: 17 BRPM | OXYGEN SATURATION: 96 % | TEMPERATURE: 97.9 F | HEART RATE: 70 BPM | BODY MASS INDEX: 44.92 KG/M2 | SYSTOLIC BLOOD PRESSURE: 142 MMHG | WEIGHT: 279.5 LBS | HEIGHT: 66 IN | DIASTOLIC BLOOD PRESSURE: 90 MMHG

## 2020-06-04 DIAGNOSIS — E03.9 HYPOTHYROIDISM, UNSPECIFIED TYPE: ICD-10-CM

## 2020-06-04 DIAGNOSIS — R73.09 ABNORMAL GLUCOSE: ICD-10-CM

## 2020-06-04 DIAGNOSIS — I10 HYPERTENSION, UNSPECIFIED TYPE: ICD-10-CM

## 2020-06-04 DIAGNOSIS — R41.3 MEMORY LOSS: Primary | ICD-10-CM

## 2020-06-04 DIAGNOSIS — J45.30 MILD PERSISTENT ASTHMA WITHOUT COMPLICATION: ICD-10-CM

## 2020-06-04 PROCEDURE — 3077F SYST BP >= 140 MM HG: CPT | Performed by: NURSE PRACTITIONER

## 2020-06-04 PROCEDURE — 99214 OFFICE O/P EST MOD 30 MIN: CPT | Performed by: NURSE PRACTITIONER

## 2020-06-04 PROCEDURE — 3008F BODY MASS INDEX DOCD: CPT | Performed by: NURSE PRACTITIONER

## 2020-06-04 PROCEDURE — 1036F TOBACCO NON-USER: CPT | Performed by: NURSE PRACTITIONER

## 2020-06-04 PROCEDURE — 3080F DIAST BP >= 90 MM HG: CPT | Performed by: NURSE PRACTITIONER

## 2020-06-05 ENCOUNTER — TELEPHONE (OUTPATIENT)
Dept: NEUROLOGY | Facility: CLINIC | Age: 50
End: 2020-06-05

## 2020-06-12 ENCOUNTER — HOSPITAL ENCOUNTER (OUTPATIENT)
Dept: RADIOLOGY | Facility: HOSPITAL | Age: 50
Discharge: HOME | End: 2020-06-12
Payer: MEDICARE

## 2020-06-12 DIAGNOSIS — R41.3 MEMORY LOSS: ICD-10-CM

## 2020-06-12 PROCEDURE — 70551 MRI BRAIN STEM W/O DYE: CPT

## 2020-06-18 ENCOUNTER — TELEPHONE (OUTPATIENT)
Dept: OBGYN CLINIC | Facility: CLINIC | Age: 50
End: 2020-06-18

## 2020-06-19 DIAGNOSIS — Z78.0 MENOPAUSE: Primary | ICD-10-CM

## 2020-06-19 RX ORDER — ESTRADIOL 1 MG/1
1 TABLET ORAL DAILY
Qty: 90 TABLET | Refills: 3 | Status: SHIPPED | OUTPATIENT
Start: 2020-06-19 | End: 2021-06-29

## 2020-06-23 ENCOUNTER — OFFICE VISIT (OUTPATIENT)
Dept: FAMILY MEDICINE CLINIC | Facility: CLINIC | Age: 50
End: 2020-06-23
Payer: MEDICARE

## 2020-06-23 VITALS
SYSTOLIC BLOOD PRESSURE: 132 MMHG | OXYGEN SATURATION: 97 % | BODY MASS INDEX: 46.07 KG/M2 | TEMPERATURE: 97.3 F | HEART RATE: 65 BPM | DIASTOLIC BLOOD PRESSURE: 80 MMHG | HEIGHT: 66 IN | WEIGHT: 286.7 LBS | RESPIRATION RATE: 17 BRPM

## 2020-06-23 DIAGNOSIS — M79.672 PAIN OF LEFT HEEL: Primary | ICD-10-CM

## 2020-06-23 PROCEDURE — 3008F BODY MASS INDEX DOCD: CPT | Performed by: NURSE PRACTITIONER

## 2020-06-23 PROCEDURE — 1036F TOBACCO NON-USER: CPT | Performed by: NURSE PRACTITIONER

## 2020-06-23 PROCEDURE — 3075F SYST BP GE 130 - 139MM HG: CPT | Performed by: NURSE PRACTITIONER

## 2020-06-23 PROCEDURE — 3079F DIAST BP 80-89 MM HG: CPT | Performed by: NURSE PRACTITIONER

## 2020-06-23 PROCEDURE — 99213 OFFICE O/P EST LOW 20 MIN: CPT | Performed by: NURSE PRACTITIONER

## 2020-06-26 ENCOUNTER — APPOINTMENT (OUTPATIENT)
Dept: RADIOLOGY | Age: 50
End: 2020-06-26
Payer: MEDICARE

## 2020-06-26 ENCOUNTER — APPOINTMENT (OUTPATIENT)
Dept: LAB | Age: 50
End: 2020-06-26
Payer: MEDICARE

## 2020-06-26 DIAGNOSIS — M79.672 PAIN OF LEFT HEEL: ICD-10-CM

## 2020-06-26 DIAGNOSIS — E03.9 HYPOTHYROIDISM, UNSPECIFIED TYPE: ICD-10-CM

## 2020-06-26 DIAGNOSIS — R41.3 MEMORY LOSS: ICD-10-CM

## 2020-06-26 DIAGNOSIS — R73.09 ABNORMAL GLUCOSE: ICD-10-CM

## 2020-06-26 LAB
ALBUMIN SERPL BCP-MCNC: 3.7 G/DL (ref 3.5–5)
ALP SERPL-CCNC: 61 U/L (ref 46–116)
ALT SERPL W P-5'-P-CCNC: 18 U/L (ref 12–78)
ANION GAP SERPL CALCULATED.3IONS-SCNC: 7 MMOL/L (ref 4–13)
AST SERPL W P-5'-P-CCNC: 12 U/L (ref 5–45)
BASOPHILS # BLD AUTO: 0.05 THOUSANDS/ΜL (ref 0–0.1)
BASOPHILS NFR BLD AUTO: 0 % (ref 0–1)
BILIRUB SERPL-MCNC: 0.45 MG/DL (ref 0.2–1)
BUN SERPL-MCNC: 12 MG/DL (ref 5–25)
CALCIUM SERPL-MCNC: 8.9 MG/DL (ref 8.3–10.1)
CHLORIDE SERPL-SCNC: 108 MMOL/L (ref 100–108)
CO2 SERPL-SCNC: 25 MMOL/L (ref 21–32)
CREAT SERPL-MCNC: 0.62 MG/DL (ref 0.6–1.3)
EOSINOPHIL # BLD AUTO: 0.03 THOUSAND/ΜL (ref 0–0.61)
EOSINOPHIL NFR BLD AUTO: 0 % (ref 0–6)
ERYTHROCYTE [DISTWIDTH] IN BLOOD BY AUTOMATED COUNT: 13.1 % (ref 11.6–15.1)
EST. AVERAGE GLUCOSE BLD GHB EST-MCNC: 105 MG/DL
GFR SERPL CREATININE-BSD FRML MDRD: 107 ML/MIN/1.73SQ M
GLUCOSE P FAST SERPL-MCNC: 91 MG/DL (ref 65–99)
HBA1C MFR BLD: 5.3 %
HCT VFR BLD AUTO: 44.1 % (ref 34.8–46.1)
HGB BLD-MCNC: 14.2 G/DL (ref 11.5–15.4)
IMM GRANULOCYTES # BLD AUTO: 0.07 THOUSAND/UL (ref 0–0.2)
IMM GRANULOCYTES NFR BLD AUTO: 1 % (ref 0–2)
LYMPHOCYTES # BLD AUTO: 2.14 THOUSANDS/ΜL (ref 0.6–4.47)
LYMPHOCYTES NFR BLD AUTO: 19 % (ref 14–44)
MCH RBC QN AUTO: 30.4 PG (ref 26.8–34.3)
MCHC RBC AUTO-ENTMCNC: 32.2 G/DL (ref 31.4–37.4)
MCV RBC AUTO: 94 FL (ref 82–98)
MONOCYTES # BLD AUTO: 0.93 THOUSAND/ΜL (ref 0.17–1.22)
MONOCYTES NFR BLD AUTO: 8 % (ref 4–12)
NEUTROPHILS # BLD AUTO: 8.23 THOUSANDS/ΜL (ref 1.85–7.62)
NEUTS SEG NFR BLD AUTO: 72 % (ref 43–75)
NRBC BLD AUTO-RTO: 0 /100 WBCS
PLATELET # BLD AUTO: 101 THOUSANDS/UL (ref 149–390)
PMV BLD AUTO: 12.2 FL (ref 8.9–12.7)
POTASSIUM SERPL-SCNC: 4 MMOL/L (ref 3.5–5.3)
PROT SERPL-MCNC: 7.6 G/DL (ref 6.4–8.2)
RBC # BLD AUTO: 4.67 MILLION/UL (ref 3.81–5.12)
SODIUM SERPL-SCNC: 140 MMOL/L (ref 136–145)
T4 FREE SERPL-MCNC: 0.94 NG/DL (ref 0.76–1.46)
TSH SERPL DL<=0.05 MIU/L-ACNC: 5.05 UIU/ML (ref 0.36–3.74)
VIT B12 SERPL-MCNC: 386 PG/ML (ref 100–900)
WBC # BLD AUTO: 11.45 THOUSAND/UL (ref 4.31–10.16)

## 2020-06-26 PROCEDURE — 36415 COLL VENOUS BLD VENIPUNCTURE: CPT

## 2020-06-26 PROCEDURE — 82607 VITAMIN B-12: CPT

## 2020-06-26 PROCEDURE — 73630 X-RAY EXAM OF FOOT: CPT

## 2020-06-26 PROCEDURE — 84439 ASSAY OF FREE THYROXINE: CPT

## 2020-06-26 PROCEDURE — 80053 COMPREHEN METABOLIC PANEL: CPT

## 2020-06-26 PROCEDURE — 84443 ASSAY THYROID STIM HORMONE: CPT

## 2020-06-26 PROCEDURE — 85025 COMPLETE CBC W/AUTO DIFF WBC: CPT

## 2020-06-26 PROCEDURE — 83036 HEMOGLOBIN GLYCOSYLATED A1C: CPT

## 2020-07-08 ENCOUNTER — OFFICE VISIT (OUTPATIENT)
Dept: FAMILY MEDICINE CLINIC | Facility: CLINIC | Age: 50
End: 2020-07-08
Payer: MEDICARE

## 2020-07-08 VITALS
WEIGHT: 288.8 LBS | RESPIRATION RATE: 22 BRPM | BODY MASS INDEX: 46.41 KG/M2 | HEIGHT: 66 IN | TEMPERATURE: 98.9 F | HEART RATE: 70 BPM | DIASTOLIC BLOOD PRESSURE: 82 MMHG | SYSTOLIC BLOOD PRESSURE: 136 MMHG

## 2020-07-08 DIAGNOSIS — M72.2 PLANTAR FASCIITIS: ICD-10-CM

## 2020-07-08 DIAGNOSIS — I10 BENIGN ESSENTIAL HYPERTENSION: Primary | ICD-10-CM

## 2020-07-08 DIAGNOSIS — F33.2 SEVERE EPISODE OF RECURRENT MAJOR DEPRESSIVE DISORDER, WITHOUT PSYCHOTIC FEATURES (HCC): ICD-10-CM

## 2020-07-08 PROCEDURE — 3079F DIAST BP 80-89 MM HG: CPT | Performed by: NURSE PRACTITIONER

## 2020-07-08 PROCEDURE — 3008F BODY MASS INDEX DOCD: CPT | Performed by: NURSE PRACTITIONER

## 2020-07-08 PROCEDURE — 1036F TOBACCO NON-USER: CPT | Performed by: NURSE PRACTITIONER

## 2020-07-08 PROCEDURE — 3075F SYST BP GE 130 - 139MM HG: CPT | Performed by: NURSE PRACTITIONER

## 2020-07-08 PROCEDURE — 99214 OFFICE O/P EST MOD 30 MIN: CPT | Performed by: NURSE PRACTITIONER

## 2020-07-08 NOTE — PATIENT INSTRUCTIONS
Plantar Fasciitis Exercises   WHAT YOU NEED TO KNOW:   What are plantar fasciitis exercises? Plantar fasciitis exercises help stretch and strengthen your foot muscles  This helps decrease stress on the plantar fascia  It may also help prevent plantar fasciitis from getting worse or coming back  Ask your healthcare provider when to start these exercises and how often to do them  Stop if you have pain  How are plantar fasciitis exercises done? · Slant board stretch:  Stand on a slanted board with your toes higher than your heel  Press your heel into the board  Keep your knee slightly bent  Hold this position for 1 minute  Repeat 5 times  · Heel raise:  Stand on your injured foot  Lift your other foot off the ground by bending your knee  Hold onto a chair or wall for balance  Raise up on your toes  Hold for 1 second and slowly lower to the ground  Repeat 10 times  · Calf stretch:  Step forward so that your uninjured foot is in front of your injured foot  Stand with your forward leg bent and your back leg straight  Gently lean forward until you feel your calf stretch  Hold for 10 seconds and relax  Repeat 20 times  · Toe curls:  Place a towel on the floor  Put your foot flat on the towel  Grab the towel with your toes by curling them under  Slowly straighten and curl your toes to pull the towel toward you  · Toe taps:  Sit down and place your foot flat on the floor  Keep your heel on the floor  Point all your toes up toward the ceiling  While the 4 smaller toes are pointed up, bend your big toe down and tap it on the ground  Do 10 to 50 taps  Point all 5 toes up toward the ceiling again  This time keep your big toe pointed up and tap the 4 smaller toes on the ground  Do 10 to 50 taps each time  When should I contact my healthcare provider? · Your pain and swelling increase  · You develop new knee, hip, or back pain      · You have questions or concerns about your condition or care  CARE AGREEMENT:   You have the right to help plan your care  Learn about your health condition and how it may be treated  Discuss treatment options with your caregivers to decide what care you want to receive  You always have the right to refuse treatment  The above information is an  only  It is not intended as medical advice for individual conditions or treatments  Talk to your doctor, nurse or pharmacist before following any medical regimen to see if it is safe and effective for you  © 2016 [employer] is for End User's use only and may not be sold, redistributed or otherwise used for commercial purposes  All illustrations and images included in CareNotes® are the copyrighted property of [employer]  or [employer]

## 2020-07-08 NOTE — PROGRESS NOTES
Assessment/Plan:       Diagnoses and all orders for this visit:    Benign essential hypertension    Plantar fasciitis  -     Ambulatory referral to Podiatry; Future    Severe episode of recurrent major depressive disorder, without [facility]          Subjective:      Patient ID: Edis Lujan is a 51 y o  female  HPI  Having left foot pain   Xray showed nothing   States feels in front of heel in arch  Worse after sitting and in am   Having shoes on helps  Left eye is starting to flare again  Swelling   Does have an eye specialist        Having depression issues    Cancelled counseling  Has    Was to be in program and won't take her due to inabilibity to pay  The following portions of the patient's history were reviewed and updated as appropriate: allergies, current medications, past family history, past medical history, past social history, past surgical history and problem list     Review of Systems   Constitutional: Positive for activity change  Negative for appetite change, chills, fatigue and fever  HENT: Negative for congestion, postnasal drip, rhinorrhea and sore throat  Respiratory: Negative for cough  Cardiovascular: Negative for chest pain  Gastrointestinal: Positive for diarrhea  Negative for abdominal pain  Genitourinary: Negative for dysuria and frequency  Musculoskeletal: Negative for back pain  Neurological: Negative for dizziness, light-headedness, numbness and headaches  Psychiatric/Behavioral: The patient is nervous/anxious  Objective:  Vitals:    07/08/20 0657   BP: 136/82   BP Location: Left arm   Patient Position: Sitting   Cuff Size: Standard   Pulse: 70   Resp: 22   Temp: 98 9 °F (37 2 °C)   Weight: 131 kg (288 lb 12 8 oz)   Height: 5' 6" (1 676 m)      Physical Exam   Constitutional: She is oriented to person, place, and time  She appears well-developed and well-nourished     HENT:   Right Ear: External ear normal    Left Ear: External ear normal    Nose: Nose normal    Mouth/Throat: Oropharynx is clear and moist    Eyes: Pupils are equal, round, and reactive to light  Cardiovascular: Normal rate, regular rhythm and normal heart sounds  Pulmonary/Chest: Effort normal and breath sounds normal    Abdominal: Soft  Bowel sounds are normal    Neurological: She is alert and oriented to person, place, and time  Skin: Skin is warm  Psychiatric: Her speech is normal and behavior is normal  Judgment and thought content normal  Cognition and memory are normal  She exhibits a depressed mood  Tearful about what is happening in life  We discussed calling counseling back    Vitals reviewed  Patient Instructions     Plantar Fasciitis Exercises   WHAT YOU NEED TO KNOW:   What are plantar fasciitis exercises? Plantar fasciitis exercises help stretch and strengthen your foot muscles  This helps decrease stress on the plantar fascia  It may also help prevent plantar fasciitis from getting worse or coming back  Ask your healthcare provider when to start these exercises and how often to do them  Stop if you have pain  How are plantar fasciitis exercises done? · Slant board stretch:  Stand on a slanted board with your toes higher than your heel  Press your heel into the board  Keep your knee slightly bent  Hold this position for 1 minute  Repeat 5 times  · Heel raise:  Stand on your injured foot  Lift your other foot off the ground by bending your knee  Hold onto a chair or wall for balance  Raise up on your toes  Hold for 1 second and slowly lower to the ground  Repeat 10 times  · Calf stretch:  Step forward so that your uninjured foot is in front of your injured foot  Stand with your forward leg bent and your back leg straight  Gently lean forward until you feel your calf stretch  Hold for 10 seconds and relax  Repeat 20 times  · Toe curls:  Place a towel on the floor   Put your foot flat on the towel  Grab the towel with your toes by curling them under  Slowly straighten and curl your toes to pull the towel toward you  · Toe taps:  Sit down and place your foot flat on the floor  Keep your heel on the floor  Point all your toes up toward the ceiling  While the 4 smaller toes are pointed up, bend your big toe down and tap it on the ground  Do 10 to 50 taps  Point all 5 toes up toward the ceiling again  This time keep your big toe pointed up and tap the 4 smaller toes on the ground  Do 10 to 50 taps each time  When should I contact my healthcare provider? · Your pain and swelling increase  · You develop new knee, hip, or back pain  · You have questions or concerns about your condition or care  CARE AGREEMENT:   You have the right to help plan your care  Learn about your health condition and how it may be treated  Discuss treatment options with your caregivers to decide what care you want to receive  You always have the right to refuse treatment  The above information is an  only  It is not intended as medical advice for individual conditions or treatments  Talk to your doctor, nurse or pharmacist before following any medical regimen to see if it is safe and effective for you  © 2016 [address] [employer] is for End User's use only and may not be sold, redistributed or otherwise used for commercial purposes  All illustrations and images included in CareNotes® are the copyrighted property of A D A M [employer] Inc  or [employer]ino [employer]heng

## 2020-07-24 ENCOUNTER — OFFICE VISIT (OUTPATIENT)
Dept: FAMILY MEDICINE CLINIC | Facility: CLINIC | Age: 50
End: 2020-07-24
Payer: MEDICARE

## 2020-07-24 VITALS
DIASTOLIC BLOOD PRESSURE: 94 MMHG | SYSTOLIC BLOOD PRESSURE: 138 MMHG | WEIGHT: 278 LBS | BODY MASS INDEX: 44.87 KG/M2 | HEART RATE: 80 BPM | TEMPERATURE: 97.9 F | RESPIRATION RATE: 18 BRPM

## 2020-07-24 DIAGNOSIS — G89.29 CHRONIC LEFT-SIDED LOW BACK PAIN WITH LEFT-SIDED SCIATICA: Primary | ICD-10-CM

## 2020-07-24 DIAGNOSIS — F31.9 BIPOLAR DEPRESSION (HCC): ICD-10-CM

## 2020-07-24 DIAGNOSIS — M54.42 CHRONIC LEFT-SIDED LOW BACK PAIN WITH LEFT-SIDED SCIATICA: Primary | ICD-10-CM

## 2020-07-24 PROCEDURE — 3080F DIAST BP >= 90 MM HG: CPT | Performed by: NURSE PRACTITIONER

## 2020-07-24 PROCEDURE — 1036F TOBACCO NON-USER: CPT | Performed by: NURSE PRACTITIONER

## 2020-07-24 PROCEDURE — 99214 OFFICE O/P EST MOD 30 MIN: CPT | Performed by: NURSE PRACTITIONER

## 2020-07-24 PROCEDURE — 3075F SYST BP GE 130 - 139MM HG: CPT | Performed by: NURSE PRACTITIONER

## 2020-07-24 RX ORDER — PREDNISONE 20 MG/1
40 TABLET ORAL DAILY
Qty: 10 TABLET | Refills: 0 | Status: SHIPPED | OUTPATIENT
Start: 2020-07-24 | End: 2020-09-15 | Stop reason: ALTCHOICE

## 2020-07-24 NOTE — LETTER
July 24, 2020     Patient: Kathryn Meadows   YOB: 1970   Date of Visit: 7/24/2020       To Whom it May Concern:    Hever Piña is under my professional care  She was seen in my office on 7/24/2020  She may return to work on 7/27/2020   Had appt today  If you have any questions or concerns, please don't hesitate to call           Sincerely,          JHONAATN Velasquez        CC: No Recipients

## 2020-07-24 NOTE — PROGRESS NOTES
Assessment/Plan:         Diagnoses and all orders for this visit:    Chronic left-sided low back pain with left-sided sciatica  -     predniSONE 20 mg tablet; Take 2 tablets (40 mg total) by mouth daily  Heat off and on for 20 min and then can reapply  Bipolar depression ([facility] )  Cont with counseling  And psych  Call the crisis if issue over weekend         Subjective:      Patient ID: Vladislav Murcia is a 51 y o  female  HPI  Here for lower back pain   Hx of that and having flare due to job  Used the TENS unit and was off x 5 days and it helped  Then back to work on Monday and started to flair   Wednesday used TENS   Still bothersome   Made it through day but radiating down left side  Working Monday, Tuesday and Fridays   Wanting to pull her due to back   Wants to not leave due to hrs and able to work more hrs  The following portions of the patient's history were reviewed and updated as appropriate: allergies, current medications, past family history, past medical history, past social history, past surgical history and problem list     Review of Systems   Constitutional: Positive for activity change  Negative for appetite change and fever  HENT: Negative for congestion and postnasal drip  Respiratory: Negative for chest tightness  Cardiovascular: Negative for chest pain  Gastrointestinal: Negative for abdominal pain  Genitourinary: Negative for dysuria and pelvic pain  Musculoskeletal: Positive for back pain and gait problem  Neurological: Positive for numbness  Negative for weakness and headaches  Objective:  Vitals:    07/24/20 1525   BP: 138/94   BP Location: Left arm   Patient Position: Sitting   Cuff Size: Standard   Pulse: 80   Resp: 18   Temp: 97 9 °F (36 6 °C)   TempSrc: Temporal   Weight: 126 kg (278 lb)      Physical Exam   Constitutional: She appears well-developed and well-nourished  She appears distressed     Musculoskeletal:        Arms:  Psychiatric: Her speech is normal  Judgment normal  Cognition and memory are normal  She exhibits a depressed mood  Tearful at first   Had difficult weekend   Called crisis and no one helped     Talked to psychiatrist and they raised the meds they had recently decreased    Did cut recently    Vitals reviewed

## 2020-07-27 DIAGNOSIS — I10 HYPERTENSION, UNSPECIFIED TYPE: ICD-10-CM

## 2020-07-29 ENCOUNTER — TELEPHONE (OUTPATIENT)
Dept: FAMILY MEDICINE CLINIC | Facility: CLINIC | Age: 50
End: 2020-07-29

## 2020-07-29 DIAGNOSIS — M54.42 CHRONIC LEFT-SIDED LOW BACK PAIN WITH LEFT-SIDED SCIATICA: Primary | ICD-10-CM

## 2020-07-29 DIAGNOSIS — G89.29 CHRONIC LEFT-SIDED LOW BACK PAIN WITH LEFT-SIDED SCIATICA: Primary | ICD-10-CM

## 2020-07-29 RX ORDER — TIZANIDINE 2 MG/1
2 TABLET ORAL 2 TIMES DAILY PRN
Qty: 15 TABLET | Refills: 0 | Status: SHIPPED | OUTPATIENT
Start: 2020-07-29 | End: 2020-09-15 | Stop reason: ALTCHOICE

## 2020-07-29 RX ORDER — METHOCARBAMOL 500 MG/1
500 TABLET, FILM COATED ORAL 3 TIMES DAILY PRN
Qty: 30 TABLET | Refills: 0 | Status: SHIPPED | OUTPATIENT
Start: 2020-07-29 | End: 2020-07-29 | Stop reason: CLARIF

## 2020-07-29 NOTE — TELEPHONE ENCOUNTER
Patient was seen Friday for back pain - given prescription for pain, applying heat   Patient is going to work 3 times next week but she is having a hard time moving, wants to know what she should do  Pain is  On left side by her buttocks , non radiating scale 8/10   Please advise

## 2020-07-29 NOTE — TELEPHONE ENCOUNTER
Tell her I can send in some robaxin a muscle relaxer but can't take with any of her sedating meds like trazodone   Let me know if wants

## 2020-07-29 NOTE — TELEPHONE ENCOUNTER
methocarbamol not covered    tianazine  2mg or 4mg is covered      [facility] [employer] [phone]

## 2020-08-03 ENCOUNTER — TELEPHONE (OUTPATIENT)
Dept: FAMILY MEDICINE CLINIC | Facility: CLINIC | Age: 50
End: 2020-08-03

## 2020-08-03 DIAGNOSIS — M54.42 CHRONIC LEFT-SIDED LOW BACK PAIN WITH LEFT-SIDED SCIATICA: Primary | ICD-10-CM

## 2020-08-03 DIAGNOSIS — G89.29 CHRONIC LEFT-SIDED LOW BACK PAIN WITH LEFT-SIDED SCIATICA: Primary | ICD-10-CM

## 2020-08-03 NOTE — TELEPHONE ENCOUNTER
Advise will order xray   That was can't stand but can walk? Placed but may need to  of fax due to I think she goes to [facility]  Advise her also after xray may need to start PT    Won't be able to get MRI until she tries that    Also see if she has seen anyone in past for her back

## 2020-08-03 NOTE — TELEPHONE ENCOUNTER
Patient is calling because she is still having bad pain for her back pain not sleeping pain going down her legs   She left work early today from work cant stand can walk please review thank you

## 2020-08-04 ENCOUNTER — TELEPHONE (OUTPATIENT)
Dept: FAMILY MEDICINE CLINIC | Facility: CLINIC | Age: 50
End: 2020-08-04

## 2020-08-04 ENCOUNTER — APPOINTMENT (OUTPATIENT)
Dept: RADIOLOGY | Age: 50
End: 2020-08-04
Payer: MEDICARE

## 2020-08-04 DIAGNOSIS — M54.42 CHRONIC LEFT-SIDED LOW BACK PAIN WITH LEFT-SIDED SCIATICA: ICD-10-CM

## 2020-08-04 DIAGNOSIS — G89.29 CHRONIC LEFT-SIDED LOW BACK PAIN WITH LEFT-SIDED SCIATICA: ICD-10-CM

## 2020-08-04 PROCEDURE — 72110 X-RAY EXAM L-2 SPINE 4/>VWS: CPT

## 2020-08-04 NOTE — TELEPHONE ENCOUNTER
Patient had to stay home from work today due to her back pain  She is wondering if Bea can give her a work note just for missing today, return tomorrow  Please advise       Work fax number is [phone] attention Leodan Beasley

## 2020-08-04 NOTE — TELEPHONE ENCOUNTER
Spoke with patient referral put in the system will do xray today   Needs work note for today she took her off

## 2020-08-06 ENCOUNTER — APPOINTMENT (OUTPATIENT)
Dept: LAB | Facility: CLINIC | Age: 50
End: 2020-08-06
Payer: MEDICARE

## 2020-08-06 ENCOUNTER — CONSULT (OUTPATIENT)
Dept: NEUROLOGY | Facility: CLINIC | Age: 50
End: 2020-08-06
Payer: MEDICARE

## 2020-08-06 ENCOUNTER — TRANSCRIBE ORDERS (OUTPATIENT)
Dept: LAB | Facility: CLINIC | Age: 50
End: 2020-08-06

## 2020-08-06 ENCOUNTER — TELEPHONE (OUTPATIENT)
Dept: FAMILY MEDICINE CLINIC | Facility: CLINIC | Age: 50
End: 2020-08-06

## 2020-08-06 VITALS
HEART RATE: 99 BPM | SYSTOLIC BLOOD PRESSURE: 134 MMHG | WEIGHT: 273.6 LBS | HEIGHT: 66 IN | DIASTOLIC BLOOD PRESSURE: 92 MMHG | BODY MASS INDEX: 43.97 KG/M2

## 2020-08-06 DIAGNOSIS — K92.1 HEMATOCHEZIA: Primary | ICD-10-CM

## 2020-08-06 DIAGNOSIS — R19.7 DIARRHEA OF PRESUMED INFECTIOUS ORIGIN: ICD-10-CM

## 2020-08-06 DIAGNOSIS — R41.3 MEMORY LOSS: ICD-10-CM

## 2020-08-06 PROCEDURE — 87209 SMEAR COMPLEX STAIN: CPT

## 2020-08-06 PROCEDURE — 1036F TOBACCO NON-USER: CPT | Performed by: PSYCHIATRY & NEUROLOGY

## 2020-08-06 PROCEDURE — 87505 NFCT AGENT DETECTION GI: CPT

## 2020-08-06 PROCEDURE — 3075F SYST BP GE 130 - 139MM HG: CPT | Performed by: PSYCHIATRY & NEUROLOGY

## 2020-08-06 PROCEDURE — 99214 OFFICE O/P EST MOD 30 MIN: CPT | Performed by: PSYCHIATRY & NEUROLOGY

## 2020-08-06 PROCEDURE — 87177 OVA AND PARASITES SMEARS: CPT

## 2020-08-06 PROCEDURE — 3008F BODY MASS INDEX DOCD: CPT | Performed by: PSYCHIATRY & NEUROLOGY

## 2020-08-06 PROCEDURE — 87493 C DIFF AMPLIFIED PROBE: CPT

## 2020-08-06 PROCEDURE — 3080F DIAST BP >= 90 MM HG: CPT | Performed by: PSYCHIATRY & NEUROLOGY

## 2020-08-06 RX ORDER — CHOLECALCIFEROL (VITAMIN D3) 125 MCG
500 CAPSULE ORAL DAILY
Qty: 39 TABLET | Refills: 1 | Status: CANCELLED | OUTPATIENT
Start: 2020-08-06

## 2020-08-06 RX ORDER — METHOCARBAMOL 500 MG/1
500 TABLET, FILM COATED ORAL AS NEEDED
COMMUNITY
End: 2020-09-15 | Stop reason: ALTCHOICE

## 2020-08-06 NOTE — PATIENT INSTRUCTIONS
Things that we know are helpful for thinking and memory   1) Exercise program: gradually increase your physical activity over time  Start small and be patient  Aerobic (cardio) activity is best but incorporate balance, strength and flexibility training as well    a  Try to get at least 30min 3 times per week   2) Diet: Mediterranean diet (colorful fruits and vegetables, olive oil, fish, whole grains, very little red meet is any), MIND diet, anti-inflammatory diet  a  Stay well hydrated: drink 6-8 glasses of water per day   b  What's good for your heart is good for your brain  c  Avoid high-salt foods   3) Sleep: aim for at least 7-8 hours per night   a  Avoid alcohol and medications like Benadryl (Tylenol PM) or other sedating drugs  4) Stress management/mindfulness practice:   a  Talk with our social workers about finding a cognitive behavioral therapists  b  Try a smart phone twin like [employer] or [employer] for beginners   i  Try curable for pain    c  Take a course in Mindfulness Based Stress Reduction (MBSR)   i  Renate antonio  Read or listen to an audiobook about it:  i  Mindfulness for beginners   ii  10% happier  iii  The happiness advantage   5) Social engagement:   a  Stay in touch with family and friends   b  Plan a few specific activities for your social health every week   c  Dimas Reynoso a local support group   d  Volunteer! www [email or web address] or call [phone]    MIND diet score:  1 point for each component      - Green leafy vegetables: at least 6 per week  - Other vegetable: at least 1 per day   - Berries: at least 2 per week  - Red meat: fewer than 4 per week   - Fish: at least 1 per week   - Poultry: at least 2 per week  - Beans: at least 3 per week  - Nuts: at least 5 per week  - Fast or fried food: less than 1 per week  - Olive oil   - Butter less: less than 1 table-spoon per day   - Cheese: less than 1 serving per week   - Pastries/sweets: less than 5 servings per week  - Alcohol: no more than 1 serving/ day

## 2020-08-06 NOTE — ASSESSMENT & PLAN NOTE
50 y/o f w h/o Bipolar, Schizoaffective, GERD, Hypothyroidism, Obesity, HTN, migraine, prior smoker presents as a new pt to evaluate for memory issues  Pt reports started about 2-3 months ago around same time he had major depressive disorder  Has some short term memory issues with confusion and misplacing things  Able to do all ADLS by herself, Friends/Family didn't notice any change, No issues w finances, directions, cooking, cleaning  Etiology- Likely her memory issues are related to her psychiatric issues and recent major depressive disorder  MRI reviewed which is negative any acute changes  Plan-   Vit B12 500 mg per day per PCP recommendations  Regular exercise- 30 min (5 days/week)  Healthy diet   Maintain sleep-wake cycle     Stress management

## 2020-08-06 NOTE — TELEPHONE ENCOUNTER
----- Message from Hank Pickering, [address] Lily Rosas sent at 8/5/2020  5:15 PM EDT -----  Mild arthritis on xray   Agreeable for starting PT?    If that fails than can get MRI or she can se if her ortho would see her

## 2020-08-06 NOTE — PROGRESS NOTES
Patient ID: Matteo Moses is a 51 y o  female  Assessment/Plan:    Memory difficulties  50 y/o f w h/o Bipolar, Schizoaffective, GERD, Hypothyroidism, Obesity, HTN, migraine, prior smoker presents as a new pt to evaluate for memory issues  Pt reports started about 2-3 months ago around same time he had major depressive disorder  Has some short term memory issues with confusion and misplacing things  Able to do all ADLS by herself, Friends/Family didn't notice any change, No issues w finances, directions, cooking, cleaning  Etiology- Likely her memory issues are related to her psychiatric issues and recent major depressive disorder  MRI reviewed which is negative any acute changes  Plan-   Vit B12 500 mg per day per PCP recommendations  Regular exercise- 30 min (5 days/week)  Healthy diet   Maintain sleep-wake cycle  Stress management        Diagnoses and all orders for this visit:    Memory loss  -     Ambulatory referral to Neurology    Other orders  -     methocarbamol (ROBAXIN) 500 mg tablet; Take 500 mg by mouth as needed for muscle spasms  -     Cancel: vitamin B-12 (VITAMIN B-12) 500 mcg tablet; Take 1 tablet (500 mcg total) by mouth daily           Subjective:    HPI    50 y/o f w h/o Bipolar, Schizoaffective, GERD, Hypothyroidism, Obesity, HTN, migraine, prior smoker presents as a new pt to evaluate for memory issues  Pt reports having trouble memory  She lives by herself  She is working part time   And label the jars  She is on disability for bipolar  She started having memory issues about 2-3 months ago  She is forgetting her meds, misplacing things, have to write down stuff as doesn't remember  She seems to forget short term and some long term  Decrease in concentration  Have to read twice to know the concept     Doesn't feel like any memory trouble while working, None of the friends told her that she is forgetful, Able to groom by herself, cook by herself, able to clean by herself, She did loose interest in hobies in past but starting to get interested in hobbies again  No issues with the directions  She is also suffering from depression think got little bit better  Recently increase the Wellbutrin and Latuda by her psychiatry which seems to be helping her  She also has blurred vision plans to see a ophthalmology  No slurred speech, NO new TIA/Stroke like symptoms  She is here for evaluation and treatment of cognitive problems  She is accompanied by no one   Primary caregiver is patient  The following portions of the patient's history were reviewed and updated as appropriate: allergies, current medications, past family history, past medical history, past social history, past surgical history and problem list          Objective:    Blood pressure 134/92, pulse 99, height 5' 6" (1 676 m), weight 124 kg (273 lb 9 6 oz)  Physical Exam   HENT:   Head: Normocephalic and atraumatic  Nose: Nose normal    Mouth/Throat: Mucous membranes are moist    Eyes: Pupils are equal, round, and reactive to light  Neck: Normal range of motion  Cardiovascular: Normal rate  Pulmonary/Chest: Effort normal    Abdominal: Normal appearance  Musculoskeletal: Normal range of motion  Neurological: She is alert  Neurological Examination:   Mental Status: The patient was awake, alert, attentive, oriented to person, place, and time  MOCA- 26/30     Cranial Nerves:   I: smell Not tested   II: visual fields Full to confrontation  Pupils equal, round, reactive to light with normal accomodation  Fundus: benign fundus  III,IV,VI: extraocular muscles EOMI, no nystagmus   V: masseter and pterygoid strength full  Sensation in the V1 through V3 distributions intact to pinprick and light touch bilaterally  VII: Face is symmetric with no weakness noted  VIII: Audition intact to finger rub bilaterally  IX/X: Uvula midline  Soft palate elevation symmetric     XI: Trapezius and SCM strength 5/5 B/L  XII: Tongue midline with no atrophy or fasciculations with appropriate movement  Motor Examination:   No pronator drift  Bulk: Normal  No atrophy Tone: Normal  Fasciculations: None  Deltoid Biceps Triceps WE   WF   FF IO     Right        5         5          5         5      5      5   5        Left           5        5          5          5      5     5   5                       IP        Quad   Ham     TA       Gastroc   Right      5            5          5         5                5  Left         4+ (Pain)5         5         5                5       Reflexes:                   Biceps Brachioradialis Triceps Patella Achilles Plantars   Right          1+            1+                  2+        0          1+         Down   Left            1+             1+                 2+         0          1+         Down     Clonus: None    Pathological Reflexes:  Hoffmans: negative    Coordination: Patient able to perform normal finger-to-nose and heel to shin appropriately  Normal rapid alternating movements  Sensory: Normal sensation to light touch, pin prick and vibratory sensation throughout  Gait:  Romberg negative  Uses cane for walking    Vitals reviewed  Neurological Exam  Mental Status  Alert  Cranial Nerves  CN III, IV, VI: Pupils equal round and reactive to light bilaterally  ROS:    Review of Systems   Constitutional: Negative  Negative for appetite change and fever  HENT: Negative  Negative for hearing loss, tinnitus, trouble swallowing and voice change  Eyes: Negative  Negative for photophobia and pain  Respiratory: Negative  Negative for shortness of breath  Cardiovascular: Negative  Negative for palpitations  Gastrointestinal: Negative  Negative for nausea and vomiting  Endocrine: Negative  Negative for cold intolerance  Genitourinary: Negative  Negative for dysuria, frequency and urgency  Musculoskeletal: Positive for back pain   Negative for myalgias and neck pain  Skin: Negative  Negative for rash  Neurological: Negative  Negative for dizziness, tremors, seizures, syncope, facial asymmetry, speech difficulty, weakness, light-headedness, numbness and headaches  Short term memory    Hematological: Negative  Does not bruise/bleed easily  Psychiatric/Behavioral: Positive for confusion and sleep disturbance  Negative for hallucinations  The patient is nervous/anxious           Depression  Mood swings

## 2020-08-07 LAB
C DIFF TOX GENS STL QL NAA+PROBE: NEGATIVE
CAMPYLOBACTER DNA SPEC NAA+PROBE: NORMAL
SALMONELLA DNA SPEC QL NAA+PROBE: NORMAL
SHIGA TOXIN STX GENE SPEC NAA+PROBE: NORMAL
SHIGELLA DNA SPEC QL NAA+PROBE: NORMAL

## 2020-08-10 LAB
G LAMBLIA AG STL QL IA: NEGATIVE
O+P STL CONC: NORMAL

## 2020-08-10 NOTE — PROGRESS NOTES
PT Evaluation     Today's date: 2020  Patient name: Blair Barboza  : 1970  MRN: [id number]  Referring provider: JHONATAN Huerta  Dx:   Encounter Diagnosis     ICD-10-CM    1  Low back pain radiating to left lower extremity  M54 5     M79 605    2  Chronic left-sided low back pain with left-sided sciatica  M54 42 Ambulatory referral to Physical Therapy    G89 29    3  Weakness of left lower extremity  R29 898        Start Time: 845  Stop Time: 940  Total time in clinic (min): 55 minutes    Assessment  Assessment details: Blair Barboza is a pleasant 51 y o  female who presents with low back pain that radiates into her L LE  Pt presents with limited lumbar ROM and L LE weakness and numbness that is increased with flexion and centralized with extension  Pt's lumbar spine was hypomobile and pt reported mild relief of back pain as well as improved posture and gait following grade II-III P-A mobilizations  Pt presented with positive SLR test however negative slump test  The patient's greatest concerns are the pain she is experiencing, worry over not knowing what's wrong, wanting to avoid surgery and fear of not being able to keep active  Pt reported loss of weight loss and appetite, occasional night pain that does not allow her to fall back asleep, and progressive LE weakness, and should continuously be monitored for progressive changes  No further referral appears necessary at this time based upon examination results  Primary movement impairment diagnosis of low back pain with radiating LE pain/numbness resulting in pathoanatomical symptoms of limited ROM, strength, functional mobility and limiting her ability to care for self, drive, exercise or recreation, go to work, lift, perform household chores, perform yard work, sit, sleep, squat to  objects from the floor, stand and walk      Primary Impairments:  1) Low back pain  2) L LE weakness  3) Limited lumbar ROM    Etiologic factors include repetitive poor body mechanics, repetitive poor lifting mechanics and poor ergonomics  Impairments: abnormal gait, abnormal muscle firing, abnormal or restricted ROM, abnormal movement, activity intolerance, impaired physical strength, lacks appropriate home exercise program, pain with function, poor posture  and poor body mechanics    Symptom irritability: moderateUnderstanding of Dx/Px/POC: good   Prognosis: good  Prognosis details: Positive prognostic indicators include positive attitude toward recovery, good understanding of diagnosis and treatment plan options and acuity of symptoms  Negative prognostic indicators include anxiety, depression, hypothyroidism, hypertension, high symptom irritability, degree of peripheralization, multiple concurrent orthopedic problems and obesity  Goals  Patient will be independent with home exercise program    Patient will be able to manage symptoms independently  Short Term Goals: to be achieved by 4 weeks  1) Patient to be independent with basic HEP  2) Decrease pain to 4/10 at its worst  3) Increase lumbar spine AROM by 25% in all deficient planes   4) Increase LE strength by 1/2 MMT grade in all deficient planes    Long Term Goals: to be achieved by discharge  1) FOTO equal to or greater than 52  2) Patient to be independent with comprehensive HEP  3) Lumbar spine ROM WNL all planes to improve a/iadls  4) Increase LE strength to 5/5 MMT grade in all planes to improve a/iadls  5) Patient to report no sleep interruption secondary to pain      Plan  Patient would benefit from: skilled physical therapy  Planned modality interventions: Modalities PRN    Planned therapy interventions: activity modification, manual therapy, neuromuscular re-education, patient education, therapeutic activities, therapeutic exercise, graded activity, home exercise program, behavior modification and self care  Frequency: 2x week  Duration in weeks: 6  Treatment plan discussed with: patient        Subjective Evaluation    History of Present Illness  Mechanism of injury: History of Current Injury: Pt reports low back pain with radicular symptoms as well as numbness into her leg down to her lateral thigh and into her groin  Pt reports pain started after liftng and twisting something at work 2-3 weeks ago which irritated her back  Pain has gotten progressively worse and is unchanged with the muscle relaxers she was prescribed  Pt reported that she was using a heating pad which helped at first but doesn't have an affect anymore  Pt reports that she feels her leg has gotten progressively weaker and is having trouble lifting her leg into her car  Pain location/Descriptors: Pressure in her back is constant  Intermittent throbbing pain into leg  Shooting pain into her groin that is provoked with certain movements  Aggravating factors: work activities such as lifting  Easing factors: no position truly relieves pain  TENS unit helps a little  24 HR pattern: better in the AM, worse with increased movement  Imaging: Xray was normal with only mild degenerative changes  Special Questions: Pt reports weight loss and loss of appetite  Pt reports waking with pain and cannot always fall back asleep  Pt denies hx of cancer but has a family hx of cancer  Pt denies ataxia, dysarthria  Pt is seeing someone for her eyesight and dysphagia  Pt denies changes in bowel/bladder changes, numbness in genital area  Patient concerns: Pain she is having at work that limits her abilities, as well as trouble driving due to pain    Occupation: works as a  in a [employer]    Pain  Current pain ratin  At best pain ratin  At worst pain rating: Merna Mathur 20  Steps to enter house: no (stairs at work are difficult)  Stairs in house: no       Diagnostic Tests  X-ray: normal        Objective     Neurological Testing     Sensation     Lumbar   Left   Intact: light touch  Diminished: light touch    Right Intact: light touch    Comments   Left light touch: L2 dermatome diminished    Reflexes   Left   Patellar (L4): absent (0)  Achilles (S1): normal (2+)  Babinski sign: negative    Right   Patellar (L4): absent (0)  Achilles (S1): normal (2+)  Babinski sign: negative    Additional Neurological Details  Guidry's reflex negative (bilaterally)    Active Range of Motion     Lumbar   Flexion:  Restriction level: minimal  Extension:  Restriction level: moderate  Left lateral flexion:  Restriction level: moderate  Right lateral flexion:  Restriction level: minimal  Left rotation:  WFL  Right rotation:  [facility]    Joint Play     Hypomobile: L1, L2, L3, L4, L5 and S1     Pain: L3, L4 and L5   Mechanical Assessment    Cervical      Thoracic      Lumbar    Standing extension: repeated movements  Pain intensity: better  Pain level: decreased  End range of motion progressive improvement throughout motion  Lying extension: sustained positions  Pain location: centralized  Pain intensity: better    Strength/Myotome Testing     Lumbar   Left   Heel walk: normal  Toe walk: normal    Right   Heel walk: normal  Toe walk: normal    Left Hip   Planes of Motion   Flexion: 4-  Abduction: 5  Adduction: 5    Right Hip   Planes of Motion   Flexion: 4+  Abduction: 5  Adduction: 5    Left Knee   Flexion: 4  Extension: 4+    Right Knee   Flexion: 5  Extension: 5    Left Ankle/Foot   Dorsiflexion: 4  Plantar flexion: 5  Great toe extension: 4+    Right Ankle/Foot   Dorsiflexion: 5  Plantar flexion: 5  Great toe extension: 5    Additional Strength Details  Requires hand-held assist for heel/toe walking    Tests     Lumbar     Left   Positive passive SLR  Negative crossed SLR, femoral stretch and slump test      Right   Negative crossed SLR, femoral stretch, passive SLR and slump test      Left Hip   Positive MERRY  Right Hip   Negative MERRY       General Comments:      Hip Comments   Hip PROM WNL on R LE  Hip PROM WNL on L LE with exception of <5 degrees IR and increased ER >50 degrees        Flowsheet Rows      Most Recent Value   PT/OT G-Codes   Current Score  29   Projected Score  52             Precautions: COPD, HTN, Asthma, Dysphagia, Spondylosis, depression, Bipolar, Urinary incontinence       8/12            Manuals             Lumbar P-A Gr  II-III mobs L2-5 EM                                                   Neuro Re-Ed             TrA activation 1x5, 5"            Glute set 1x5, 5"                                                                             Ther Ex                          Repeated motions Standing extension x10, HEP                                                                                          Ther Activity                                       Gait Training                                       Modalities                          Education HEP, Prognosis, POC            Assessment IE             PT 1:1 from 2970-9223

## 2020-08-12 ENCOUNTER — EVALUATION (OUTPATIENT)
Dept: PHYSICAL THERAPY | Facility: CLINIC | Age: 50
End: 2020-08-12
Payer: MEDICARE

## 2020-08-12 DIAGNOSIS — R29.898 WEAKNESS OF LEFT LOWER EXTREMITY: ICD-10-CM

## 2020-08-12 DIAGNOSIS — G89.29 CHRONIC LEFT-SIDED LOW BACK PAIN WITH LEFT-SIDED SCIATICA: ICD-10-CM

## 2020-08-12 DIAGNOSIS — M54.42 CHRONIC LEFT-SIDED LOW BACK PAIN WITH LEFT-SIDED SCIATICA: ICD-10-CM

## 2020-08-12 DIAGNOSIS — M79.605 LOW BACK PAIN RADIATING TO LEFT LOWER EXTREMITY: Primary | ICD-10-CM

## 2020-08-12 DIAGNOSIS — M54.50 LOW BACK PAIN RADIATING TO LEFT LOWER EXTREMITY: Primary | ICD-10-CM

## 2020-08-12 PROCEDURE — 97162 PT EVAL MOD COMPLEX 30 MIN: CPT | Performed by: PHYSICAL THERAPIST

## 2020-08-12 PROCEDURE — 97140 MANUAL THERAPY 1/> REGIONS: CPT | Performed by: PHYSICAL THERAPIST

## 2020-08-12 PROCEDURE — 97112 NEUROMUSCULAR REEDUCATION: CPT | Performed by: PHYSICAL THERAPIST

## 2020-08-17 ENCOUNTER — TELEMEDICINE (OUTPATIENT)
Dept: FAMILY MEDICINE CLINIC | Facility: CLINIC | Age: 50
End: 2020-08-17
Payer: MEDICARE

## 2020-08-17 ENCOUNTER — TELEPHONE (OUTPATIENT)
Dept: FAMILY MEDICINE CLINIC | Facility: CLINIC | Age: 50
End: 2020-08-17

## 2020-08-17 DIAGNOSIS — H57.89 EYE DISCHARGE: Primary | ICD-10-CM

## 2020-08-17 PROCEDURE — 3080F DIAST BP >= 90 MM HG: CPT | Performed by: FAMILY MEDICINE

## 2020-08-17 PROCEDURE — 3075F SYST BP GE 130 - 139MM HG: CPT | Performed by: FAMILY MEDICINE

## 2020-08-17 PROCEDURE — 1036F TOBACCO NON-USER: CPT | Performed by: FAMILY MEDICINE

## 2020-08-17 PROCEDURE — 99213 OFFICE O/P EST LOW 20 MIN: CPT | Performed by: FAMILY MEDICINE

## 2020-08-17 NOTE — PROGRESS NOTES
Virtual Regular Visit      Assessment/Plan:  ?? Pink eye   Some minor discharge but no pain / vision change : 1-2 days     Problem List Items Addressed This Visit     None               Reason for visit is   Chief Complaint   Patient presents with    Virtual Regular Visit        Encounter provider Bridger Browning MD    Provider located at [address] [address]      Recent Visits  No visits were found meeting these conditions  Showing recent visits within past 7 days and meeting all other requirements     Future Appointments  No visits were found meeting these conditions  Showing future appointments within next 150 days and meeting all other requirements        The patient was identified by name and date of birth  Hussain Johnson was informed that this is a telemedicine visit and that the visit is being conducted through [employer] and patient was informed that this is not a secure, HIPAA-complaint platform  She agrees to proceed     My office door was closed  No one else was in the room  She acknowledged consent and understanding of privacy and security of the video platform  The patient has agreed to participate and understands they can discontinue the visit at any time  Patient is aware this is a billable service       Subjective  Hussain Johnson is a 51 y o  female       HPI     Past Medical History:   Diagnosis Date    Anxiety     Arthritis     oa cassandra knees    Asthma     good control- no medications    Yan's esophagus     Bipolar affective disorder ([facility] [address] )     Breathing difficulty     After vascular surgery was diagnosed with sleep apnea    Chronic pain disorder     lumbar    CPAP (continuous positive airway pressure) dependence     Degenerative disc disease at L5-S1 level     Deliberate self-cutting     Depression 09/16/2008    Disease of thyroid gland     hypo    MARTINEZ (dyspnea on exertion)  Drug overdose 10/28/2008    suicide attempt    Dysphagia     Dyspnea     Edema     BLE    GERD (gastroesophageal reflux disease)     High blood pressure     Knee pain, bilateral     Especially right    Migraines     Obese     Overactive bladder     Sjogren's disease ([address] )     Sleep apnea     Stress incontinence     Suicidal ideations     Umbilical hernia     surgical repair today 4/24/2019    Use of cane as ambulatory aid     awaiting b/l knee replacement    Wears glasses        Past Surgical History:   Procedure Laterality Date    BREAST BIOPSY Right 1989    benign    CARPAL TUNNEL RELEASE Left     CHOLECYSTECTOMY  05/2003    Laparoscopic    COLONOSCOPY      01/12/2009    DILATION AND CURETTAGE OF UTERUS      ELBOW SURGERY Left     x2   No hardware    ESOPHAGOGASTRODUODENOSCOPY      FOOT SURGERY Left     Plantar fasciotomy    HYSTERECTOMY      laporoscopic, partial    KNEE ARTHROSCOPY Bilateral     OOPHORECTOMY Left 10/2015    WI ANAL SPHINCTEROTOMY N/A 8/31/2018    Procedure: EUA, LEFT PARTIAL INTERNAL SPHINCTEROTOMY;  Surgeon: Iris Ferrara MD;  Location: [address] [place] [address] OR;  Service: Colorectal    WI REPAIR UMBILICAL ZPPX,4+R/H,DIJYW N/A 4/24/2019    Procedure: REPAIR HERNIA UMBILICAL LAPAROSCOPIC W/ ROBOTICS;  Surgeon: Devika Kimball MD;  Location: [facility] OR;  Service: General    REDUCTION MAMMAPLASTY Bilateral 1999    REPAIR LACERATION Left     left hand-5/18/2009    TONSILECTOMY AND ADNOIDECTOMY      TONSILLECTOMY      VEIN LIGATION Bilateral     WISDOM TOOTH EXTRACTION         Current Outpatient Medications   Medication Sig Dispense Refill    amLODIPine (NORVASC) 5 mg tablet Take 2 tablets (10 mg total) by mouth daily (Patient taking differently: Take 5 mg by mouth daily )      buPROPion (WELLBUTRIN) 100 mg tablet Take 300 mg by mouth every morning       colestipol (COLESTID) 1 g tablet Take 1 tablet (1 g total) by mouth 2 (two) times a day 60 tablet 0    dicyclomine (BENTYL) 10 mg capsule TAKE 1 CAPSULE BY MOUTH EVERY 6 HOURS AS NEEDED FOR ABDOMINAL PAIN      divalproex sodium (DEPAKOTE) 250 mg EC tablet Take 3 tablets (750 mg total) by mouth 2 (two) times a day (Patient taking differently: Take 500 mg by mouth 2 (two) times a day 500mg am  250mg pm) 180 tablet 0    estradiol (ESTRACE) 1 mg tablet Take 1 tablet (1 mg total) by mouth daily 90 tablet 3    fluticasone-salmeterol (ADVAIR, WIXELA) 100-50 mcg/dose inhaler Inhale 1 puff 2 (two) times a day Rinse mouth after use  1 Inhaler 5    hydrOXYzine HCL (ATARAX) 10 mg tablet Take 10 mg by mouth every 6 (six) hours as needed for itching      losartan (COZAAR) 50 mg tablet Take 50 mg by mouth daily       lurasidone (LATUDA) 80 mg tablet Take 60 mg by mouth daily with dinner       methocarbamol (ROBAXIN) 500 mg tablet Take 500 mg by mouth as needed for muscle spasms      metoprolol tartrate (LOPRESSOR) 25 mg tablet Take 1 tablet (25 mg total) by mouth 2 (two) times a day 60 tablet 5    pantoprazole (PROTONIX) 40 mg tablet Take 40 mg by mouth 2 (two) times a day      pilocarpine (SALAGEN) 5 mg tablet Take 1 tablet (5 mg total) by mouth 3 (three) times a day 60 tablet 0    predniSONE 20 mg tablet Take 2 tablets (40 mg total) by mouth daily 10 tablet 0    tiZANidine (ZANAFLEX) 2 mg tablet Take 1 tablet (2 mg total) by mouth 2 (two) times a day as needed for muscle spasms 15 tablet 0    topiramate (TOPAMAX) 100 mg tablet Take 100 mg by mouth 2 (two) times a day      TOVIAZ 4 MG TB24 Take 1 tablet (4 mg total) by mouth daily  2    furosemide (LASIX) 20 mg tablet Take 1 tablet (20 mg total) by mouth daily 30 tablet 3    traZODone (DESYREL) 100 mg tablet Take 2 tablets (200 mg total) by mouth daily at bedtime for 212 days (Patient taking differently: Take 100 mg by mouth ) 60 tablet 0     No current facility-administered medications for this visit           Allergies   Allergen Reactions    Percocet [Oxycodone-Acetaminophen] Headache     Severe headaches       Review of Systems   Constitutional: Negative for chills and fever  HENT: Negative for congestion, ear pain, rhinorrhea, sinus pain and sore throat  Eyes: Negative for visual disturbance  Respiratory: Negative for cough, shortness of breath and wheezing  Cardiovascular: Negative for chest pain  Gastrointestinal: Negative for constipation and diarrhea  Skin: Negative for rash  Video Exam    There were no vitals filed for this visit  Physical Exam  Vitals reviewed: Conjunctivas appear normal EOMI  No visible discharge at this time  Constitutional:       Appearance: She is well-developed  HENT:      Head: Normocephalic and atraumatic  Eyes:      Conjunctiva/sclera: Conjunctivae normal    Pulmonary:      Effort: Pulmonary effort is normal  No respiratory distress  Neurological:      Mental Status: She is alert and oriented to person, place, and time  Psychiatric:         Behavior: Behavior normal          Thought Content: Thought content normal          Judgment: Judgment normal           I spent 15 minutes directly with the patient during this visit      VIRTUAL VISIT DISCLAIMER    Rox Coley acknowledges that she has consented to an online visit or consultation  She understands that the online visit is based solely on information provided by her, and that, in the absence of a face-to-face physical evaluation by the physician, the diagnosis she receives is both limited and provisional in terms of accuracy and completeness  This is not intended to replace a full medical face-to-face evaluation by the physician  Rox Coley understands and accepts these terms

## 2020-08-17 NOTE — TELEPHONE ENCOUNTER
----- Message from Paola Rodriguez MD sent at 8/17/2020 10:53 AM EDT -----  Please fax a copy of the work letter    Unique Hoffman tells me someone has a fax number up front

## 2020-08-17 NOTE — LETTER
August 17, 2020     Patient: Xu Casillas   YOB: 1970   Date of Visit: 8/17/2020       To Whom it May Concern:    Mary Ellen Rosenthal is under my professional care  She was seen in my office on 8/17/2020  She may return to work on 8/18/20  If you have any questions or concerns, please don't hesitate to call           Sincerely,          Ainsley Jaimes MD        CC: No Recipients

## 2020-08-19 ENCOUNTER — OFFICE VISIT (OUTPATIENT)
Dept: PHYSICAL THERAPY | Facility: CLINIC | Age: 50
End: 2020-08-19
Payer: MEDICARE

## 2020-08-19 DIAGNOSIS — R29.898 WEAKNESS OF LEFT LOWER EXTREMITY: ICD-10-CM

## 2020-08-19 DIAGNOSIS — M79.605 LOW BACK PAIN RADIATING TO LEFT LOWER EXTREMITY: ICD-10-CM

## 2020-08-19 DIAGNOSIS — M54.42 CHRONIC LEFT-SIDED LOW BACK PAIN WITH LEFT-SIDED SCIATICA: Primary | ICD-10-CM

## 2020-08-19 DIAGNOSIS — M54.50 LOW BACK PAIN RADIATING TO LEFT LOWER EXTREMITY: ICD-10-CM

## 2020-08-19 DIAGNOSIS — G89.29 CHRONIC LEFT-SIDED LOW BACK PAIN WITH LEFT-SIDED SCIATICA: Primary | ICD-10-CM

## 2020-08-19 PROCEDURE — 97140 MANUAL THERAPY 1/> REGIONS: CPT | Performed by: PHYSICAL THERAPIST

## 2020-08-19 PROCEDURE — 97112 NEUROMUSCULAR REEDUCATION: CPT | Performed by: PHYSICAL THERAPIST

## 2020-08-19 NOTE — PROGRESS NOTES
Daily Note     Today's date: 2020  Patient name: Blair Barboza  : 1970  MRN: [id number]  Referring provider: JHONATAN Huerta  Dx:   Encounter Diagnosis     ICD-10-CM    1  Chronic left-sided low back pain with left-sided sciatica  M54 42     G89 29    2  Low back pain radiating to left lower extremity  M54 5     M79 605    3  Weakness of left lower extremity  R29 898        Start Time: 801  Stop Time:   Total time in clinic (min): 40 minutes    Subjective: The patient presents for the first follow-up appointment, reports that symptoms improved and that she was noncompliant some of the time with the initial HEP    The patient reported that during her TA set exercise as part of her IHEP, she felt uncomfortable pressure in her GI system, which she attributes to an unrelated abdominal diagnosis she is currently receiving care for elsewhere    Objective: See treatment diary below      Assessment: The patient tolerated manual and active treatment well today  The PT reviewed IHEP and introduced initial manual and ther-ex program during today's treatment  The patient demonstrated good response to today's treatment  The patient was provided updated instructions for abdominal recruitment exercises using a PBall for isometric UE presses as well as lumbar extensions against the wall    Plan: Continue per plan of care        Precautions: COPD, HTN, Asthma, Dysphagia, Spondylosis, depression, Bipolar, Urinary incontinence                  Manuals             Lumbar P-A Gr  II-III mobs L2-5 EM SRB                                                  Neuro Re-Ed             TrA activation 1x5, 5" Standing w/ PBall press           Glute set 1x5, 5" Review           Prone multif + SLR  2x10 ea, HEP                                                               Ther Ex             Active warmup  Recumb bike x6 min           Repeated motions Standing extension x10, HEP against wall Ther Activity                                       Gait Training                                       Modalities                          Education HEP, Prognosis, POC UHEP           Assessment IE

## 2020-08-19 NOTE — PROGRESS NOTES
Daily Note      Today's date: 2020  Patient name: Eliecer Nogueira  : 1970  MRN: [id number]  Referring provider: JHONATAN Huertas  Dx:   Encounter Diagnosis     ICD-10-CM    1  Chronic left-sided low back pain with left-sided sciatica  M54 42     G89 29    2  Low back pain radiating to left lower extremity  M54 5     M79 605    3  Weakness of left lower extremity  R29 898        Start Time: 1445  Stop Time: 1530  Total time in clinic (min): 45 minutes  The patient was treated by RUPAL Hanna under direct supervision of Wendy Painting DPT    Subjective: Pt reports that her back is a little sore and is having minor pain into her leg due to sleeping wrong on her back and a rough workday  Pt also reports receiving a shot in her foot this morning which now feels "okay"  Objective: See treatment diary below      Assessment: Tolerated treatment well having relief from LE and low back symptoms following treatment  Hip extension and abduction were added to pt's program to promote LE and low back strengthening and stability  Bridges were added to pt's program to promote core strength and stability in an extended position  Pt still demonstrated minor lumbar hypomobility that was decreased with mobilization  Patient demonstrated fatigue post treatment, exhibited good technique with therapeutic exercises and would benefit from continued PT      Plan: Continue per plan of care        Precautions: COPD, HTN, Asthma, Dysphagia, Spondylosis, depression, Bipolar, Urinary incontinence                Manuals            Lumbar P-A Gr  II-III mobs L2-5 EM SRB EM                                             Neuro Re-Ed            TrA activation 1x5, 5" Standing w/ PBall press Standing w/ PBall press         Glute set 1x5, 5" Review          Prone multif + SLR  2x10 ea, HEP 2x10 ea         Standing hip abd/ext   2x10 ea YTB, HEP         Pallof press   2x10 GTB                                 Ther Ex Active warmup  Recumb bike x6 min Recumb bike x6 min         Repeated motions Standing extension x10, HEP against wall against wall 3x10         bridges   2x10                                                                     Ther Activity                                    Gait Training                                    Modalities                        Education HEP, Prognosis, POC UHEP UHEP         Assessment IE           PT 1:1 from [id number]

## 2020-08-20 ENCOUNTER — OFFICE VISIT (OUTPATIENT)
Dept: PHYSICAL THERAPY | Facility: CLINIC | Age: 50
End: 2020-08-20
Payer: MEDICARE

## 2020-08-20 DIAGNOSIS — M54.42 CHRONIC LEFT-SIDED LOW BACK PAIN WITH LEFT-SIDED SCIATICA: Primary | ICD-10-CM

## 2020-08-20 DIAGNOSIS — M54.50 LOW BACK PAIN RADIATING TO LEFT LOWER EXTREMITY: ICD-10-CM

## 2020-08-20 DIAGNOSIS — G89.29 CHRONIC LEFT-SIDED LOW BACK PAIN WITH LEFT-SIDED SCIATICA: Primary | ICD-10-CM

## 2020-08-20 DIAGNOSIS — M79.605 LOW BACK PAIN RADIATING TO LEFT LOWER EXTREMITY: ICD-10-CM

## 2020-08-20 DIAGNOSIS — R29.898 WEAKNESS OF LEFT LOWER EXTREMITY: ICD-10-CM

## 2020-08-20 PROCEDURE — 97110 THERAPEUTIC EXERCISES: CPT | Performed by: PHYSICAL THERAPIST

## 2020-08-20 PROCEDURE — 97112 NEUROMUSCULAR REEDUCATION: CPT | Performed by: PHYSICAL THERAPIST

## 2020-08-24 ENCOUNTER — OFFICE VISIT (OUTPATIENT)
Dept: PHYSICAL THERAPY | Facility: CLINIC | Age: 50
End: 2020-08-24
Payer: MEDICARE

## 2020-08-24 DIAGNOSIS — M79.605 LOW BACK PAIN RADIATING TO LEFT LOWER EXTREMITY: ICD-10-CM

## 2020-08-24 DIAGNOSIS — I10 HYPERTENSION, UNSPECIFIED TYPE: ICD-10-CM

## 2020-08-24 DIAGNOSIS — M54.50 LOW BACK PAIN RADIATING TO LEFT LOWER EXTREMITY: ICD-10-CM

## 2020-08-24 DIAGNOSIS — G89.29 CHRONIC LEFT-SIDED LOW BACK PAIN WITH LEFT-SIDED SCIATICA: Primary | ICD-10-CM

## 2020-08-24 DIAGNOSIS — R29.898 WEAKNESS OF LEFT LOWER EXTREMITY: ICD-10-CM

## 2020-08-24 DIAGNOSIS — M54.42 CHRONIC LEFT-SIDED LOW BACK PAIN WITH LEFT-SIDED SCIATICA: Primary | ICD-10-CM

## 2020-08-24 PROCEDURE — 97140 MANUAL THERAPY 1/> REGIONS: CPT | Performed by: PHYSICAL THERAPIST

## 2020-08-24 PROCEDURE — 97112 NEUROMUSCULAR REEDUCATION: CPT | Performed by: PHYSICAL THERAPIST

## 2020-08-24 RX ORDER — FUROSEMIDE 20 MG/1
20 TABLET ORAL DAILY
Qty: 30 TABLET | Refills: 2 | Status: SHIPPED | OUTPATIENT
Start: 2020-08-24 | End: 2020-11-16

## 2020-08-24 NOTE — PROGRESS NOTES
Daily Note     Today's date: 2020  Patient name: Matteo Moses  : 1970  MRN: [id number]  Referring provider: JHONATAN Sanders  Dx:   Encounter Diagnosis     ICD-10-CM    1  Chronic left-sided low back pain with left-sided sciatica  M54 42     G89 29    2  Low back pain radiating to left lower extremity  M54 5     M79 605    3  Weakness of left lower extremity  R29 898        Start Time: 1607  Stop Time: 1650  Total time in clinic (min): 43 minutes  The patient was treated by RUPAL Hanna under direct supervision of Kev Abbott DPT    Subjective: Pt reports her back hurting a bit today due to working however other than that it has been okay  Pt also reports not doing her exercises as much as she should but "did a few this morning"  Pt also reported having some tyson-lateral L thigh pain  Objective: See treatment diary below      Assessment: Tolerated treatment well having no increase in pain with interventions  Pt demonstrated improved lumbar ROM in all planes with only minor limitations in flexion and extension  Pt was able to complete prone press ups without a problem and reported relief with standing extension following this and manual therapy  Standing hip flexor stretch was added to pt's program to promote flexibility and based on pt's subjective complaint  PT reiterated the importance of trying to be more compliant with HEP as pt is already making progress but could be making even more with increased consistency  Patient demonstrated fatigue post treatment, exhibited good technique with therapeutic exercises and would benefit from continued PT      Plan: Continue per plan of care        Precautions: COPD, HTN, Asthma, Dysphagia, Spondylosis, depression, Bipolar, Urinary incontinence               Manuals            Lumbar P-A Gr  II-III mobs L2-5 EM SRB EM EM                                            Neuro Re-Ed            TrA activation 1x5, 5" Standing w/ PBall press Standing w/ PBall press Single leg 2x8, 5"        Glute set 1x5, 5" Review          Prone multif + SLR  2x10 ea, HEP 2x10 ea 2x10 ea        Standing hip abd/ext   2x10 ea YTB, HEP 2x10 ea YTB        Pallof press   2x10 GTB 3x10 GTB                                Ther Ex            Active warmup  Recumb bike x6 min Recumb bike x6 min Recumb bike x6 min, lvl 5        Repeated motions Standing extension x10, HEP against wall against wall 3x10         bridges   2x10 2x10        Prone press ups    1x10        Clam shells    2x10 YTB        Hip flexor lunge stretch w one leg knelt on low table    4x10" L LE                                Ther Activity                                    Gait Training                                    Modalities                        Education HEP, Prognosis, POC UHEP UHEP         Assessment IE           PT 1:1 from [id number]

## 2020-08-25 NOTE — PROGRESS NOTES
Daily Note     Today's date: 2020  Patient name: Zafar Cunningham  : 1970  MRN: [id number]  Referring provider: JHONATAN Medina  Dx:   Encounter Diagnosis     ICD-10-CM    1  Chronic left-sided low back pain with left-sided sciatica  M54 42     G89 29    2  Low back pain radiating to left lower extremity  M54 5     M79 605    3  Weakness of left lower extremity  R29 898        Start Time: 930  Stop Time: 101  Total time in clinic (min): 45 minutes  The patient was treated by RUPAL Hanna under direct supervision of Eriberto Alas DPT    Subjective: Pt reports that her back feels good however she still has some shooting pain into her anterior thigh      Objective: See treatment diary below      Assessment: Tolerated treatment well however c/o anterior thigh pain intermittently throughout the treatment  Prone quad stretch was given to pt based on her subjective complaint which she reported "felt good" however didn't completely get rid of the pain  Pt was instructed on proper squat form as 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, and pt demonstrated good form after verbal cues and demo  Patient demonstrated fatigue post treatment, exhibited good technique with therapeutic exercises and would benefit from continued PT      Plan: Continue per plan of care        Precautions: COPD, HTN, Asthma, Dysphagia, Spondylosis, depression, Bipolar, Urinary incontinence              Manuals            Lumbar P-A Gr  II-III mobs L2-5 EM SRB EM EM EM                                           Neuro Re-Ed            TrA activation 1x5, 5" Standing w/ PBall press Standing w/ PBall press Single leg 2x8, 5"        Glute set 1x5, 5" Review          Prone multif + SLR  2x10 ea, HEP 2x10 ea 2x10 ea        Standing hip abd/ext   2x10 ea YTB, HEP 2x10 ea YTB 2x10 ea YTB       Pallof press   2x10 GTB 3x10 GTB 3x10 @ mariela 7#       squats     2x10 at bar, HEP Ther Ex            Active warmup  Recumb bike x6 min Recumb bike x6 min Recumb bike x6 min, lvl 5 Recumb bike x8 min, lvl 5       Repeated motions Standing extension x10, HEP against wall against wall 3x10         bridges   2x10 2x10 2x10 w RTB       Prone press ups    1x10 2x10       Clam shells    2x10 YTB 2x10 RTB, HEP       Hip flexor lunge stretch w one leg knelt on low table    4x10" L LE 4x15" L LE       Prone quad stretch w strap     B/L 5x15", HEP                   Ther Activity                                    Gait Training                                    Modalities                        Education HEP, Prognosis, POC UHEP UHEP  UHEP       Assessment IE           PT 1:1 from [id number]

## 2020-08-27 ENCOUNTER — APPOINTMENT (OUTPATIENT)
Dept: LAB | Facility: CLINIC | Age: 50
End: 2020-08-27
Payer: MEDICARE

## 2020-08-27 ENCOUNTER — OFFICE VISIT (OUTPATIENT)
Dept: PHYSICAL THERAPY | Facility: CLINIC | Age: 50
End: 2020-08-27
Payer: MEDICARE

## 2020-08-27 DIAGNOSIS — G89.29 CHRONIC LEFT-SIDED LOW BACK PAIN WITH LEFT-SIDED SCIATICA: Primary | ICD-10-CM

## 2020-08-27 DIAGNOSIS — M54.42 CHRONIC LEFT-SIDED LOW BACK PAIN WITH LEFT-SIDED SCIATICA: Primary | ICD-10-CM

## 2020-08-27 DIAGNOSIS — M79.605 LOW BACK PAIN RADIATING TO LEFT LOWER EXTREMITY: ICD-10-CM

## 2020-08-27 DIAGNOSIS — M54.50 LOW BACK PAIN RADIATING TO LEFT LOWER EXTREMITY: ICD-10-CM

## 2020-08-27 DIAGNOSIS — R29.898 WEAKNESS OF LEFT LOWER EXTREMITY: ICD-10-CM

## 2020-08-27 LAB
ERYTHROCYTE [DISTWIDTH] IN BLOOD BY AUTOMATED COUNT: 12.8 % (ref 11.6–15.1)
HCT VFR BLD AUTO: 45.5 % (ref 34.8–46.1)
HGB BLD-MCNC: 14.7 G/DL (ref 11.5–15.4)
MCH RBC QN AUTO: 30.1 PG (ref 26.8–34.3)
MCHC RBC AUTO-ENTMCNC: 32.3 G/DL (ref 31.4–37.4)
MCV RBC AUTO: 93 FL (ref 82–98)
PMV BLD AUTO: 11.9 FL (ref 8.9–12.7)
RBC # BLD AUTO: 4.89 MILLION/UL (ref 3.81–5.12)
WBC # BLD AUTO: 11.8 THOUSAND/UL (ref 4.31–10.16)

## 2020-08-27 PROCEDURE — 36415 COLL VENOUS BLD VENIPUNCTURE: CPT

## 2020-08-27 PROCEDURE — 85027 COMPLETE CBC AUTOMATED: CPT

## 2020-08-27 PROCEDURE — 97140 MANUAL THERAPY 1/> REGIONS: CPT | Performed by: PHYSICAL THERAPIST

## 2020-08-27 PROCEDURE — 97110 THERAPEUTIC EXERCISES: CPT | Performed by: PHYSICAL THERAPIST

## 2020-08-27 PROCEDURE — 97112 NEUROMUSCULAR REEDUCATION: CPT | Performed by: PHYSICAL THERAPIST

## 2020-08-31 ENCOUNTER — OFFICE VISIT (OUTPATIENT)
Dept: PHYSICAL THERAPY | Facility: CLINIC | Age: 50
End: 2020-08-31
Payer: MEDICARE

## 2020-08-31 DIAGNOSIS — M54.50 LOW BACK PAIN RADIATING TO LEFT LOWER EXTREMITY: ICD-10-CM

## 2020-08-31 DIAGNOSIS — G89.29 CHRONIC LEFT-SIDED LOW BACK PAIN WITH LEFT-SIDED SCIATICA: Primary | ICD-10-CM

## 2020-08-31 DIAGNOSIS — M54.42 CHRONIC LEFT-SIDED LOW BACK PAIN WITH LEFT-SIDED SCIATICA: Primary | ICD-10-CM

## 2020-08-31 DIAGNOSIS — M79.605 LOW BACK PAIN RADIATING TO LEFT LOWER EXTREMITY: ICD-10-CM

## 2020-08-31 DIAGNOSIS — R29.898 WEAKNESS OF LEFT LOWER EXTREMITY: ICD-10-CM

## 2020-08-31 PROCEDURE — 97110 THERAPEUTIC EXERCISES: CPT | Performed by: PHYSICAL THERAPIST

## 2020-08-31 PROCEDURE — 97112 NEUROMUSCULAR REEDUCATION: CPT | Performed by: PHYSICAL THERAPIST

## 2020-08-31 NOTE — PROGRESS NOTES
Daily Note     Today's date: 2020  Patient name: Matteo Moses  : 1970  MRN: [id number]  Referring provider: JHONATAN Sanders  Dx:   Encounter Diagnosis     ICD-10-CM    1  Chronic left-sided low back pain with left-sided sciatica  M54 42     G89 29    2  Low back pain radiating to left lower extremity  M54 5     M79 605    3  Weakness of left lower extremity  R29 [id number]        Start Time: 1613  Stop Time: 1708  Total time in clinic (min): 55 minutes    Subjective: No new complaints today  The patient reports improvement in symptoms since previous session  Objective: See treatment diary below      Assessment: The PT continued to emphasize core control and lumbar extension during today's treatment  Short-excursion RDLs added to the patient's current program to promote hip hinging and general lumbopelvic differentiation  The patient tolerated manual and active treatment well today  Plan: Continue per plan of care        Precautions: COPD, HTN, Asthma, Dysphagia, Spondylosis, depression, Bipolar, Urinary incontinence             Manuals            Lumbar P-A Gr  II-III mobs L2-5 EM SRB EM EM EM SRB                                          Neuro Re-Ed            TrA activation 1x5, 5" Standing w/ PBall press Standing w/ PBall press Single leg 2x8, 5"  Single leg 2x8, 5"      Glute set 1x5, 5" Review          Prone multif + SLR  2x10 ea, HEP 2x10 ea 2x10 ea        Standing hip abd/ext   2x10 ea YTB, HEP 2x10 ea YTB 2x10 ea YTB 2x10 ea YTB      Pallof press   2x10 GTB 3x10 GTB 3x10 @ mariela 7# 3x10 @ mariela 7#      squats     2x10 at bar, HEP 2x10 at bar                  Ther Ex            Active warmup  Recumb bike x6 min Recumb bike x6 min Recumb bike x6 min, lvl 5 Recumb bike x8 min, lvl 5 Recumb bike x8 min, lvl 5      Repeated motions Standing extension x10, HEP against wall against wall 3x10         bridges   2x10 2x10 2x10 w RTB 2x10 w RTB      Prone press ups    1x10 2x10 2x10      Clam shells    2x10 YTB 2x10 RTB, HEP 2x10 w RTB      Hip flexor lunge stretch w one leg knelt on low table    4x10" L LE 4x15" L LE 4x15" L LE      Prone quad stretch w strap     B/L 5x15", HEP B/L 5x15"                  Ther Activity            RDL      To low mat w/ green KB 2x10                  Gait Training                                    Modalities                        Education HEP, Prognosis, POC UHEP UHEP  UHEP       Assessment IE

## 2020-09-02 ENCOUNTER — TELEPHONE (OUTPATIENT)
Dept: FAMILY MEDICINE CLINIC | Facility: CLINIC | Age: 50
End: 2020-09-02

## 2020-09-02 ENCOUNTER — APPOINTMENT (OUTPATIENT)
Dept: PHYSICAL THERAPY | Facility: CLINIC | Age: 50
End: 2020-09-02
Payer: MEDICARE

## 2020-09-02 NOTE — TELEPHONE ENCOUNTER
Patient is calling because of her eyes she is having yellow discharge blurry vision in the morning and she cant see good  Please review thank you? ?   She did call [facility] sight and they told her to go her regular eye doctor

## 2020-09-02 NOTE — TELEPHONE ENCOUNTER
What did the nadiya Escobedo think it was the last time she was there? I feel this needs a specialist since it has been going on so long    Not sure what we can offer

## 2020-09-03 NOTE — TELEPHONE ENCOUNTER
Patient states they told her it was poor hygiene of th eye lids  Patient says she doesn't understand this because she washes her face and showers and has good hygiene  They recommended she see her normal eye doctor because it might be her prescription but patient states she just got a new prescription  Patient states she thinks it could be due to her crying at night time  Patient says every morning she has the pus in the eyes and it's impacting her daily life  Please advise

## 2020-09-04 NOTE — TELEPHONE ENCOUNTER
Talked with pt    Eye Dr said it was poor hygiene   Told  to wash with baby shampoo    Was crying a lot at night  I asked if maybe she was rubbing eye due to this    Vision still not good   Will wash eyes 2-3 times a day with baby shampoo and set up to have glasses checked with her eye DR she normally sees

## 2020-09-09 ENCOUNTER — OFFICE VISIT (OUTPATIENT)
Dept: PHYSICAL THERAPY | Facility: CLINIC | Age: 50
End: 2020-09-09
Payer: MEDICARE

## 2020-09-09 DIAGNOSIS — M79.605 LOW BACK PAIN RADIATING TO LEFT LOWER EXTREMITY: ICD-10-CM

## 2020-09-09 DIAGNOSIS — M54.50 LOW BACK PAIN RADIATING TO LEFT LOWER EXTREMITY: ICD-10-CM

## 2020-09-09 DIAGNOSIS — G89.29 CHRONIC LEFT-SIDED LOW BACK PAIN WITH LEFT-SIDED SCIATICA: Primary | ICD-10-CM

## 2020-09-09 DIAGNOSIS — R29.898 WEAKNESS OF LEFT LOWER EXTREMITY: ICD-10-CM

## 2020-09-09 DIAGNOSIS — M54.42 CHRONIC LEFT-SIDED LOW BACK PAIN WITH LEFT-SIDED SCIATICA: Primary | ICD-10-CM

## 2020-09-09 PROCEDURE — 97112 NEUROMUSCULAR REEDUCATION: CPT | Performed by: PHYSICAL THERAPIST

## 2020-09-09 PROCEDURE — 97140 MANUAL THERAPY 1/> REGIONS: CPT | Performed by: PHYSICAL THERAPIST

## 2020-09-09 NOTE — PROGRESS NOTES
Daily Note     Today's date: 2020  Patient name: Ana Shepherd  : 1970  MRN: [id number]  Referring provider: JHONATAN Green  Dx:   Encounter Diagnosis     ICD-10-CM    1  Chronic left-sided low back pain with left-sided sciatica  M54 42     G89 29    2  Low back pain radiating to left lower extremity  M54 5     M79 605    3  Weakness of left lower extremity  R29 898        Start Time: 1015  Stop Time: 1100  Total time in clinic (min): 45 minutes    Subjective: No new complaints today  The patient reports improvement in symptoms since previous session, but does note general increased soreness throughout her lumbar region  The patient was willing to share with the PT regarding her recent social and financial challenges, including the threat of being evicted from her apartment complex  The patient expressed a high degree of frustration and stress with her current circumstances  Objective: See treatment diary below      Assessment: The PT modified course of care during today's treatment  Pain education added to the patient's current program to promote a more holistic and global understanding of how her symptoms and her current life circumstances are related  Sensitive nerves and the body as an alarm system were used as analogies for how to begin to understand the pain experience  The patient tolerated manual and active treatment well today  Plan: Continue per plan of care        Precautions: COPD, HTN, Asthma, Dysphagia, Spondylosis, depression, Bipolar, Urinary incontinence           Manuals            Lumbar P-A Gr  II-III mobs L2-5 EM SRB EM EM EM SRB SRB SRB                                        Neuro Re-Ed            TrA activation 1x5, 5" Standing w/ PBall press Standing w/ PBall press Single leg 2x8, 5"  Single leg 2x8, 5" Single leg 2x8, 5"     Glute set 1x5, 5" Review          Prone multif + SLR  2x10 ea, HEP 2x10 ea 2x10 ea    4x10 ea Standing hip abd/ext   2x10 ea YTB, HEP 2x10 ea YTB 2x10 ea YTB 2x10 ea YTB 2x10 ea YTB     Pallof press   2x10 GTB 3x10 GTB 3x10 @ mariela 7# 3x10 @ mariela 7# 3x10 @ mariela 7#     Squats     2x10 at bar, HEP 2x10 at bar 2x10 at bar                 Ther Ex            Active warmup  Recumb bike x6 min Recumb bike x6 min Recumb bike x6 min, lvl 5 Recumb bike x8 min, lvl 5 Recumb bike x8 min, lvl 5 Recumb bike x8 min, lvl 5 Recumb bike x10 min, lvl 5    Repeated motions Standing extension x10, HEP against wall against wall 3x10         bridges   2x10 2x10 2x10 w RTB 2x10 w RTB 2x10 w RTB     Prone press ups    1x10 2x10 2x10 2x10     Clam shells    2x10 YTB 2x10 RTB, HEP 2x10 w RTB 2x10 w RTB     Hip flexor lunge stretch w one leg knelt on low table    4x10" L LE 4x15" L LE 4x15" L LE 4x15" L LE     Prone quad stretch w strap     B/L 5x15", HEP B/L 5x15" B/L 5x15" B/L 5x15"                Ther Activity            RDL      To low mat w/ green KB 2x10 To low mat w/ green KB 2x10 NV    Box lift         NV    Resisted retro walking        NV                                        Gait Training                                    Modalities                        Education HEP, Prognosis, POC UHEP UHEP  UHEP   PNE x30 min    Assessment IE

## 2020-09-10 ENCOUNTER — APPOINTMENT (OUTPATIENT)
Dept: PHYSICAL THERAPY | Facility: CLINIC | Age: 50
End: 2020-09-10
Payer: MEDICARE

## 2020-09-14 ENCOUNTER — EVALUATION (OUTPATIENT)
Dept: PHYSICAL THERAPY | Facility: CLINIC | Age: 50
End: 2020-09-14
Payer: MEDICARE

## 2020-09-14 ENCOUNTER — TELEPHONE (OUTPATIENT)
Dept: FAMILY MEDICINE CLINIC | Facility: CLINIC | Age: 50
End: 2020-09-14

## 2020-09-14 DIAGNOSIS — M79.605 LOW BACK PAIN RADIATING TO LEFT LOWER EXTREMITY: ICD-10-CM

## 2020-09-14 DIAGNOSIS — M54.2 CERVICALGIA: ICD-10-CM

## 2020-09-14 DIAGNOSIS — R29.898 WEAKNESS OF LEFT LOWER EXTREMITY: ICD-10-CM

## 2020-09-14 DIAGNOSIS — M54.50 LOW BACK PAIN RADIATING TO LEFT LOWER EXTREMITY: ICD-10-CM

## 2020-09-14 DIAGNOSIS — G89.29 CHRONIC LEFT-SIDED LOW BACK PAIN WITH LEFT-SIDED SCIATICA: Primary | ICD-10-CM

## 2020-09-14 DIAGNOSIS — M54.42 CHRONIC LEFT-SIDED LOW BACK PAIN WITH LEFT-SIDED SCIATICA: Primary | ICD-10-CM

## 2020-09-14 PROCEDURE — 97140 MANUAL THERAPY 1/> REGIONS: CPT | Performed by: PHYSICAL THERAPIST

## 2020-09-14 PROCEDURE — 97110 THERAPEUTIC EXERCISES: CPT | Performed by: PHYSICAL THERAPIST

## 2020-09-14 PROCEDURE — 97112 NEUROMUSCULAR REEDUCATION: CPT | Performed by: PHYSICAL THERAPIST

## 2020-09-14 NOTE — PROGRESS NOTES
PT Re-Evaluation    Today's date: 2020  Patient name: Blair Barboza  : 1970  MRN: [id number]  Referring provider: JHONATAN Huerta  Dx:   Encounter Diagnosis     ICD-10-CM    1  Chronic left-sided low back pain with left-sided sciatica  M54 42     G89 29    2  Low back pain radiating to left lower extremity  M54 5     M79 605    3  Weakness of left lower extremity  R29 898    4  Cervicalgia  M54 2        Start Time:   Stop Time: 1700  Total time in clinic (min): 45 minutes    Subjective: No new complaints today  The patient reports worsening in symptoms since previous session, reports increased episodes of dizziness  The patient reports 4/10 pain at it's worst over the past 24 hours, and reports 50% improvement in overall condition since beginning formal PT care  The patient's chief remaining concern is recent episodes of losing her balance  Objective: See treatment diary below    Neurological Testing:  Negative for sanchez's, babinski, clonus, abnormal reflexes  DTR's WNL bilaterally UEs and LEs    VBI testing negative  No reports of 5 D's, 3 N's  Ligamentous testing negative for transverse, alar ligaments    VOMS: WNL all tests, no symptom reproduction    Mobility Testing:  Hypomobility C2-C7    Improved symptoms with cervical traction, sub-occipital release      Assessment: Blair Barboza is a pleasant 51 y o  female who has been receiving PT intervention for her lower back pain  The patient now presents with an additional complaint of generalized neck pain and stiffness which she reports is related to recent life stresses  The patient reports that she has pain with movement all directions which is limiting her ability to exercise or recreation, look up, sleep and turn to look over shoulder        Primary Impairments:  1)  Cervical spine hypomobility  2)  Suboccipital muscle hypertonicity      Goals  Patient will be independent with home exercise program    Patient will be able to manage symptoms independently  Short Term Goals: to be achieved by 4 weeks  1) Patient to be independent with basic HEP  2) Decrease pain to 4/10 at its worst  3) Increase lumbar spine AROM by 25% in all deficient planes   4) Increase LE strength by 1/2 MMT grade in all deficient planes    Long Term Goals: to be achieved by discharge  1) FOTO equal to or greater than 52  2) Patient to be independent with comprehensive HEP  3) Lumbar spine ROM WNL all planes to improve a/iadls  4) Increase LE strength to 5/5 MMT grade in all planes to improve a/iadls  5) Patient to report no sleep interruption secondary to pain    Cervical Goals (NEW 9/10/20)    Short Term Goals: to be achieved by 4 weeks  1) Patient to be independent with basic HEP  2) Decrease pain to 4/10 at its worst   3) Increase cervical spine ROM by 5-10 degrees in all deficient planes  4) Increase UE strength by 1/2 MMT grade in all deficient planes  5) Improve joint mobility in cervical spine to normal     Long Term Goals: to be achieved by discharge  1) Patient to be independent with comprehensive HEP  2) Cervical spine ROM WNL all planes to improve a/iadls  3) Increase UE strength to 1 MMT grade in all planes to improve a/iadls  4) Lifting is improved to maximal level of function       Plan  Begin PT care for cervical spine in addition to lumbar spine  Patient would benefit from: skilled physical therapy  Planned modality interventions: Modalities PRN    Planned therapy interventions: activity modification, manual therapy, neuromuscular re-education, patient education, therapeutic activities, therapeutic exercise, graded activity, home exercise program, behavior modification and self care  Frequency: 2x week  Duration in weeks: 6  Treatment plan discussed with: patient           Precautions: COPD, HTN, Asthma, Dysphagia, Spondylosis, depression, Bipolar, Urinary incontinence       8/12 8/19 8/20 8/24 8/27 8/31 9/2 9/9 9/14   Manuals Lumbar Spine            Lumbar P-A Gr  II-III mobs L2-5 EM SRB EM EM EM SRB SRB SRB    Cervical Spine            Sub-occipital release         SRB               Neuro Re-Ed            Lumbar Spine            TrA activation 1x5, 5" Standing w/ PBall press Standing w/ PBall press Single leg 2x8, 5"  Single leg 2x8, 5" Single leg 2x8, 5"     Glute set 1x5, 5" Review          Prone multif + SLR  2x10 ea, HEP 2x10 ea 2x10 ea    4x10 ea    Standing hip abd/ext   2x10 ea YTB, HEP 2x10 ea YTB 2x10 ea YTB 2x10 ea YTB 2x10 ea YTB     Pallof press   2x10 GTB 3x10 GTB 3x10 @ mariela 7# 3x10 @ mariela 7# 3x10 @ mariela 7#     Squats     2x10 at bar, HEP 2x10 at bar 2x10 at bar     Cervical Spine            Cervical retractions         3 sec x25, HEP                                                   Ther Ex                        Lumbar Spine            Active warmup  Recumb bike x6 min Recumb bike x6 min Recumb bike x6 min, lvl 5 Recumb bike x8 min, lvl 5 Recumb bike x8 min, lvl 5 Recumb bike x8 min, lvl 5 Recumb bike x10 min, lvl 5 Recumb bike x10 min, lvl 5   Repeated motions Standing extension x10, HEP against wall against wall 3x10         bridges   2x10 2x10 2x10 w RTB 2x10 w RTB 2x10 w RTB     Prone press ups    1x10 2x10 2x10 2x10     Clam shells    2x10 YTB 2x10 RTB, HEP 2x10 w RTB 2x10 w RTB     Hip flexor lunge stretch w one leg knelt on low table    4x10" L LE 4x15" L LE 4x15" L LE 4x15" L LE     Prone quad stretch w strap     B/L 5x15", HEP B/L 5x15" B/L 5x15" B/L 5x15"    Cervical Spine            Scalene stretch         15 sec x5 ea, HEP                                                   Ther Activity            RDL      To low mat w/ green KB 2x10 To low mat w/ green KB 2x10 NV    Box lift         NV    Resisted retro walking        NV                                        Gait Training                                    Modalities                        Education HEP, Prognosis, POC UHEP UHEP  UHEP   PNE x30 min UPOC, prognosis   Assessment IE        DA eval

## 2020-09-14 NOTE — TELEPHONE ENCOUNTER
Patient is still having issues with the eyes  She woke up with the right one closed again over the weekend  She said the outside of them are very red and sore, not as much pus as before, just a little bit this time  Patient states she started using the drops (tobramycine)  She used them twice yesterday and once this morning  Bea prescribed again and that seems to help  Please advise  Patient knows Bea's out of the office today and said if she misses our call tomorrow to leave a message because she will be at work

## 2020-09-15 ENCOUNTER — OFFICE VISIT (OUTPATIENT)
Dept: FAMILY MEDICINE CLINIC | Facility: CLINIC | Age: 50
End: 2020-09-15
Payer: MEDICARE

## 2020-09-15 VITALS
HEART RATE: 80 BPM | WEIGHT: 258.7 LBS | SYSTOLIC BLOOD PRESSURE: 144 MMHG | TEMPERATURE: 97.5 F | BODY MASS INDEX: 41.76 KG/M2 | DIASTOLIC BLOOD PRESSURE: 90 MMHG | RESPIRATION RATE: 18 BRPM

## 2020-09-15 DIAGNOSIS — R42 VERTIGO: Primary | ICD-10-CM

## 2020-09-15 DIAGNOSIS — H01.115 ALLERGIC DERMATITIS OF UPPER AND LOWER LIDS OF BOTH EYES: ICD-10-CM

## 2020-09-15 DIAGNOSIS — H01.114 ALLERGIC DERMATITIS OF UPPER AND LOWER LIDS OF BOTH EYES: ICD-10-CM

## 2020-09-15 DIAGNOSIS — H01.111 ALLERGIC DERMATITIS OF UPPER AND LOWER LIDS OF BOTH EYES: ICD-10-CM

## 2020-09-15 DIAGNOSIS — H01.112 ALLERGIC DERMATITIS OF UPPER AND LOWER LIDS OF BOTH EYES: ICD-10-CM

## 2020-09-15 PROCEDURE — 99214 OFFICE O/P EST MOD 30 MIN: CPT | Performed by: NURSE PRACTITIONER

## 2020-09-15 RX ORDER — LORATADINE 10 MG/1
10 TABLET ORAL DAILY
Qty: 30 TABLET | Refills: 5 | Status: SHIPPED | OUTPATIENT
Start: 2020-09-15 | End: 2021-05-17 | Stop reason: SDUPTHER

## 2020-09-15 RX ORDER — MECLIZINE HYDROCHLORIDE 25 MG/1
25 TABLET ORAL 3 TIMES DAILY PRN
Qty: 21 TABLET | Refills: 0 | Status: SHIPPED | OUTPATIENT
Start: 2020-09-15 | End: 2021-02-17 | Stop reason: ALTCHOICE

## 2020-09-15 RX ORDER — TRAZODONE HYDROCHLORIDE 150 MG/1
150 TABLET ORAL
COMMUNITY
Start: 2020-09-14 | End: 2021-08-28

## 2020-09-15 RX ORDER — POLYETHYLENE GLYCOL-3350, SODIUM CHLORIDE, POTASSIUM CHLORIDE AND SODIUM BICARBONATE 420; 11.2; 5.72; 1.48 G/438.4G; G/438.4G; G/438.4G; G/438.4G
POWDER, FOR SOLUTION ORAL
COMMUNITY
Start: 2020-08-19 | End: 2020-12-18 | Stop reason: ALTCHOICE

## 2020-09-15 RX ORDER — DIVALPROEX SODIUM 500 MG/1
500 TABLET, DELAYED RELEASE ORAL DAILY
COMMUNITY
Start: 2020-09-14 | End: 2021-07-12 | Stop reason: HOSPADM

## 2020-09-15 RX ORDER — BUPROPION HYDROCHLORIDE 300 MG/1
300 TABLET ORAL DAILY
COMMUNITY
Start: 2020-09-14 | End: 2021-07-12 | Stop reason: HOSPADM

## 2020-09-15 NOTE — TELEPHONE ENCOUNTER
Advise to continue the drops for a total of 7 days then stop   After that would have to go see her eye DR   I am not sure why this keeps happening

## 2020-09-15 NOTE — PATIENT INSTRUCTIONS
Benign Paroxysmal Positional Vertigo, Ambulatory Care   GENERAL INFORMATION:   Benign paroxysmal positional vertigo (BPPV)  is an inner ear condition  With BPPV you have paroxysmal (sudden) attacks of vertigo when you change your head position  Vertigo is the sudden feeling that you or the room is moving or spinning  With each attack of vertigo, you may have nystagmus  Nystagmus is a quick, shaky eye movement that you cannot control  The attacks of vertigo and nystagmus last from a few seconds up to 1 minute  Common symptoms include the following:  Head movements, such as looking up or down and bending over, may lead to an attack of vertigo  Vertigo may also occur when you first lie down or roll over in bed  The nystagmus will decrease with vertigo attacks that occur close together  When you have an attack of BPPV, you may also have the following symptoms:  · Changes in your vision    · Nausea or vomiting    · Poor balance or feeling unsteady when you walk  Seek immediate care for the following symptoms:   · A severe headache that does not go away    · New changes in your vision or feeling weak or confused    · Trouble hearing, or ringing or buzzing in your ears    · Symptoms that last longer than 1 minute  Treatment for BPPV  may go away on it's own  Treatments may include head movements or balance therapy  You may need surgery if other treatments have failed  Manage your symptoms:   · Avoid sudden head movements  · Do not bend over at the waist       · Keep your head raised when you lie down  Place pillows under your upper back and head or rest in a recliner  · Try not to stay in bed for long periods of time  Change your position often when you are in bed  Try not to lie with your head on the same side for long periods of time  · Go to vestibular and balance rehabilitation therapy (VBRT)  During VBRT, you learn exercises to improve your balance and strength   VBRT may help decrease your dizziness and prevent injuries if you are at risk for falls  Ask for more information about VBRT and exercises to decrease your symptoms  Follow up with your healthcare provider as directed:  Write down your questions so you remember to ask them during your visits  CARE AGREEMENT:   You have the right to help plan your care  Learn about your health condition and how it may be treated  Discuss treatment options with your caregivers to decide what care you want to receive  You always have the right to refuse treatment  The above information is an  only  It is not intended as medical advice for individual conditions or treatments  Talk to your doctor, nurse or pharmacist before following any medical regimen to see if it is safe and effective for you  © 2014 [address] is for End User's use only and may not be sold, redistributed or otherwise used for commercial purposes  All illustrations and images included in CareNotes® are the copyrighted property of [employer]  or [employer]

## 2020-09-15 NOTE — LETTER
September 15, 2020     Patient: Yessi Sinha   YOB: 1970   Date of Visit: 9/15/2020       To Whom it May Concern:    Doe Sanders is under my professional care  She was seen in my office on 9/15/2020  She may return to work on 9/18/2020  If you have any questions or concerns, please don't hesitate to call           Sincerely,          JHONATAN Poole        CC: No Recipients

## 2020-09-15 NOTE — PROGRESS NOTES
Assessment/Plan:         Diagnoses and all orders for this visit:    Vertigo  -     meclizine (ANTIVERT) 25 mg tablet; Take 1 tablet (25 mg total) by mouth 3 (three) times a day as needed for dizziness    Allergic dermatitis of upper and lower lids of both eyes  -     loratadine (CLARITIN) 10 mg tablet; Take 1 tablet (10 mg total) by mouth daily    Other orders  -     buPROPion (WELLBUTRIN XL) 300 mg 24 hr tablet  -     divalproex sodium (DEPAKOTE) 500 mg EC tablet  -     GaviLyte-N with Flavor Pack 420 g solution  -     traZODone (DESYREL) 150 mg tablet        Increase water intake   Drinking only 1 bottle a day and advised to increase  For eyes will see her eye Dr           Subjective:      Patient ID: Joselin Freeman is a 51 y o  female  HPI  Having issues with vertigo and balance   Started yesterday at noon   Bending over and walking straight at times    Occasional spinning   Position changing   Standing too fast        Knees doing better with PT    Walking better as far as that      Eyelids still puffy   The following portions of the patient's history were reviewed and updated as appropriate: allergies, current medications, past family history, past medical history, past social history, past surgical history and problem list     Review of Systems   Constitutional: Negative for activity change, appetite change, fatigue and fever  HENT: Negative for congestion  Eyes: Positive for redness and itching  Negative for discharge  Neurological: Positive for dizziness and light-headedness  Negative for weakness, numbness and headaches  Objective:  Vitals:    09/15/20 1424   BP: 144/90   BP Location: Left arm   Patient Position: Sitting   Cuff Size: Large   Pulse: 80   Resp: 18   Temp: 97 5 °F (36 4 °C)   TempSrc: Temporal   Weight: 117 kg (258 lb 11 2 oz)      Physical Exam  Vitals signs reviewed  Constitutional:       Appearance: Normal appearance  She is obese     HENT:      Right Ear: Tympanic membrane, ear canal and external ear normal       Left Ear: Tympanic membrane, ear canal and external ear normal    Eyes:      Conjunctiva/sclera: Conjunctivae normal       Pupils: Pupils are equal, round, and reactive to light  Comments: Eyelids are inflamed with right >left    Area rough and dry    Neurological:      Mental Status: She is alert  BP lying, sitting unchanged

## 2020-09-16 ENCOUNTER — OFFICE VISIT (OUTPATIENT)
Dept: PHYSICAL THERAPY | Facility: CLINIC | Age: 50
End: 2020-09-16
Payer: MEDICARE

## 2020-09-16 DIAGNOSIS — M54.50 LOW BACK PAIN RADIATING TO LEFT LOWER EXTREMITY: ICD-10-CM

## 2020-09-16 DIAGNOSIS — R29.898 WEAKNESS OF LEFT LOWER EXTREMITY: ICD-10-CM

## 2020-09-16 DIAGNOSIS — M54.2 CERVICALGIA: ICD-10-CM

## 2020-09-16 DIAGNOSIS — M54.42 CHRONIC LEFT-SIDED LOW BACK PAIN WITH LEFT-SIDED SCIATICA: Primary | ICD-10-CM

## 2020-09-16 DIAGNOSIS — M79.605 LOW BACK PAIN RADIATING TO LEFT LOWER EXTREMITY: ICD-10-CM

## 2020-09-16 DIAGNOSIS — G89.29 CHRONIC LEFT-SIDED LOW BACK PAIN WITH LEFT-SIDED SCIATICA: Primary | ICD-10-CM

## 2020-09-16 PROCEDURE — 97140 MANUAL THERAPY 1/> REGIONS: CPT | Performed by: PHYSICAL THERAPIST

## 2020-09-16 PROCEDURE — 97112 NEUROMUSCULAR REEDUCATION: CPT | Performed by: PHYSICAL THERAPIST

## 2020-09-16 NOTE — PROGRESS NOTES
Daily Note     Today's date: 2020  Patient name: Eliecer Nogueira  : 1970  MRN: [id number]  Referring provider: JHONATAN Huertas  Dx:   Encounter Diagnosis     ICD-10-CM    1  Chronic left-sided low back pain with left-sided sciatica  M54 42     G89 29    2  Low back pain radiating to left lower extremity  M54 5     M79 605    3  Weakness of left lower extremity  R29 898    4  Cervicalgia  M54 2                   Subjective: No new complaints today  The patient reports some change in symptoms since previous session  Objective: See treatment diary below      Assessment: The PT initiated cervical spine treatment in addition to lumbar and lower quarter care during today's treatment  Upper quarter strengthening added to the patient's current program to promote overall strength and reduce symptoms  The patient tolerated manual and active treatment well today  Plan: Continue per plan of care        Precautions: COPD, HTN, Asthma, Dysphagia, Spondylosis, depression, Bipolar, Urinary incontinence          Manuals            Lumbar Spine            Lumbar P-A Gr  II-III mobs L2-5 SRB     SRB SRB SRB    Cervical Spine            Sub-occipital release SRB        SRB   Manual cervical traction  SRB           Neuro Re-Ed            Lumbar Spine            TrA activation      Single leg 2x8, 5" Single leg 2x8, 5"     Glute set            Prone multif + SLR 4x10 ea       4x10 ea    Standing hip abd/ext 2x10 ea YTB     2x10 ea YTB 2x10 ea YTB     Pallof press 3x10 @ mariela 7#     3x10 @ mariela 7# 3x10 @ mariela 7#     Squats      2x10 at bar 2x10 at bar     Cervical Spine            Cervical retractions 3 sec x25        3 sec x25, HEP   Shoulder rows/ext 3x10 ea GTB           Cervical SNAGs (ext) 2x10, HEP                                   Ther Ex                        Lumbar Spine            Active warmup Recumb bike x10 min, lvl 5     Recumb bike x8 min, lvl 5 Recumb bike x8 min, lvl 5 Recumb bike x10 min, lvl 5 Recumb bike x10 min, lvl 5   Repeated motions            bridges      2x10 w RTB 2x10 w RTB     Prone press ups      2x10 2x10     Clam shells      2x10 w RTB 2x10 w RTB     Hip flexor lunge stretch w one leg knelt on low table      4x15" L LE 4x15" L LE     Prone quad stretch w strap B/L 5x15"     B/L 5x15" B/L 5x15" B/L 5x15"    Cervical Spine            Scalene stretch 15 sec x5 ea        15 sec x5 ea, HEP                                                   Ther Activity            RDL      To low mat w/ green KB 2x10 To low mat w/ green KB 2x10 NV    Box lift         NV    Resisted retro walking        NV                                        Gait Training                                    Modalities                        Education        PNE x30 min UPOC, prognosis   Assessment         DA paulal

## 2020-09-21 ENCOUNTER — APPOINTMENT (OUTPATIENT)
Dept: PHYSICAL THERAPY | Facility: CLINIC | Age: 50
End: 2020-09-21
Payer: MEDICARE

## 2020-09-23 ENCOUNTER — OFFICE VISIT (OUTPATIENT)
Dept: PHYSICAL THERAPY | Facility: CLINIC | Age: 50
End: 2020-09-23
Payer: MEDICARE

## 2020-09-23 DIAGNOSIS — M54.42 CHRONIC LEFT-SIDED LOW BACK PAIN WITH LEFT-SIDED SCIATICA: Primary | ICD-10-CM

## 2020-09-23 DIAGNOSIS — G89.29 CHRONIC LEFT-SIDED LOW BACK PAIN WITH LEFT-SIDED SCIATICA: Primary | ICD-10-CM

## 2020-09-23 DIAGNOSIS — M54.50 LOW BACK PAIN RADIATING TO LEFT LOWER EXTREMITY: ICD-10-CM

## 2020-09-23 DIAGNOSIS — R29.898 WEAKNESS OF LEFT LOWER EXTREMITY: ICD-10-CM

## 2020-09-23 DIAGNOSIS — M54.2 CERVICALGIA: ICD-10-CM

## 2020-09-23 DIAGNOSIS — M79.605 LOW BACK PAIN RADIATING TO LEFT LOWER EXTREMITY: ICD-10-CM

## 2020-09-23 PROCEDURE — 97112 NEUROMUSCULAR REEDUCATION: CPT | Performed by: PHYSICAL THERAPIST

## 2020-09-23 PROCEDURE — 97140 MANUAL THERAPY 1/> REGIONS: CPT | Performed by: PHYSICAL THERAPIST

## 2020-09-23 NOTE — PROGRESS NOTES
Daily Note     Today's date: 2020  Patient name: Ginger Baxter  : 1970  MRN: [id number]  Referring provider: JHONATAN Kumar  Dx:   Encounter Diagnosis     ICD-10-CM    1  Chronic left-sided low back pain with left-sided sciatica  M54 42     G89 29    2  Low back pain radiating to left lower extremity  M54 5     M79 605    3  Weakness of left lower extremity  R29 898    4  Cervicalgia  M54 2        Start Time: 610  Stop Time:   Total time in clinic (min): 40 minutes    Subjective: No new complaints today  The patient reports some change in symptoms since previous session, but is severely depressed today due to ongoing circumstances surrounding her current living situation  Objective: See treatment diary below      Assessment: The PT held active interventions during today's treatment  Increased effort to educate the patient on the science of pain, as well as advocacy for pursuing appropriate help for her mental health added to the patient's current program to promote safety and appropriate emotional management  The patient responded mostly positively to today's session  Plan: Continue per plan of care        Precautions: COPD, HTN, Asthma, Dysphagia, Spondylosis, depression, Bipolar, Urinary incontinence          Manuals            Lumbar Spine            Lumbar P-A Gr  II-III mobs L2-5 SRB SRB    SRB SRB SRB    Cervical Spine            Sub-occipital release SRB SRB       SRB   Manual cervical traction  SRB SRB          Neuro Re-Ed            Lumbar Spine            TrA activation      Single leg 2x8, 5" Single leg 2x8, 5"     Glute set            Prone multif + SLR 4x10 ea       4x10 ea    Standing hip abd/ext 2x10 ea YTB     2x10 ea YTB 2x10 ea YTB     Pallof press 3x10 @ mariela 7#     3x10 @ mariela 7# 3x10 @ mariela 7#     Squats      2x10 at bar 2x10 at bar     Cervical Spine            Cervical retractions 3 sec x25        3 sec x25, HEP Shoulder rows/ext 3x10 ea GTB           Cervical SNAGs (ext) 2x10, HEP                                   Ther Ex                        Lumbar Spine            Active warmup Recumb bike x10 min, lvl 5     Recumb bike x8 min, lvl 5 Recumb bike x8 min, lvl 5 Recumb bike x10 min, lvl 5 Recumb bike x10 min, lvl 5   Repeated motions            bridges      2x10 w RTB 2x10 w RTB     Prone press ups      2x10 2x10     Clam shells      2x10 w RTB 2x10 w RTB     Hip flexor lunge stretch w one leg knelt on low table      4x15" L LE 4x15" L LE     Prone quad stretch w strap B/L 5x15"     B/L 5x15" B/L 5x15" B/L 5x15"    Cervical Spine            Scalene stretch 15 sec x5 ea        15 sec x5 ea, HEP                                                   Ther Activity            RDL      To low mat w/ green KB 2x10 To low mat w/ green KB 2x10 NV    Box lift         NV    Resisted retro walking        NV                                        Gait Training                                    Modalities                        Education  PNE x30 min      PNE x30 min UPOC, prognosis   Assessment  FOTO       DA eval

## 2020-09-28 ENCOUNTER — APPOINTMENT (OUTPATIENT)
Dept: PHYSICAL THERAPY | Facility: CLINIC | Age: 50
End: 2020-09-28
Payer: MEDICARE

## 2020-09-30 ENCOUNTER — APPOINTMENT (OUTPATIENT)
Dept: PHYSICAL THERAPY | Facility: CLINIC | Age: 50
End: 2020-09-30
Payer: MEDICARE

## 2020-10-10 ENCOUNTER — NURSE TRIAGE (OUTPATIENT)
Dept: OTHER | Facility: OTHER | Age: 50
End: 2020-10-10

## 2020-10-10 DIAGNOSIS — H10.32 ACUTE CONJUNCTIVITIS OF LEFT EYE, UNSPECIFIED ACUTE CONJUNCTIVITIS TYPE: Primary | ICD-10-CM

## 2020-10-10 RX ORDER — GENTAMICIN SULFATE 3 MG/ML
1 SOLUTION/ DROPS OPHTHALMIC 4 TIMES DAILY
Qty: 1.4 ML | Refills: 0 | Status: SHIPPED | OUTPATIENT
Start: 2020-10-10 | End: 2020-10-17

## 2020-10-12 ENCOUNTER — DOCUMENTATION (OUTPATIENT)
Dept: FAMILY MEDICINE CLINIC | Facility: CLINIC | Age: 50
End: 2020-10-12

## 2020-10-12 ENCOUNTER — OFFICE VISIT (OUTPATIENT)
Dept: FAMILY MEDICINE CLINIC | Facility: CLINIC | Age: 50
End: 2020-10-12
Payer: MEDICARE

## 2020-10-12 VITALS
BODY MASS INDEX: 41.8 KG/M2 | SYSTOLIC BLOOD PRESSURE: 130 MMHG | WEIGHT: 260.1 LBS | OXYGEN SATURATION: 94 % | TEMPERATURE: 97.4 F | HEIGHT: 66 IN | RESPIRATION RATE: 16 BRPM | DIASTOLIC BLOOD PRESSURE: 72 MMHG | HEART RATE: 81 BPM

## 2020-10-12 DIAGNOSIS — L23.9 ALLERGIC CONTACT DERMATITIS, UNSPECIFIED TRIGGER: Primary | ICD-10-CM

## 2020-10-12 PROCEDURE — 99214 OFFICE O/P EST MOD 30 MIN: CPT | Performed by: NURSE PRACTITIONER

## 2020-10-12 RX ORDER — PREDNISONE 20 MG/1
20 TABLET ORAL 2 TIMES DAILY WITH MEALS
Qty: 10 TABLET | Refills: 0 | Status: SHIPPED | OUTPATIENT
Start: 2020-10-12 | End: 2020-10-17

## 2020-10-14 ENCOUNTER — TELEPHONE (OUTPATIENT)
Dept: FAMILY MEDICINE CLINIC | Facility: CLINIC | Age: 50
End: 2020-10-14

## 2020-10-19 NOTE — PROGRESS NOTES
The patient has been absent from PT and will be discharged from formal care at this time  At the time of the patient's last PT session, she demonstrated improvement in decreased pain, increased ROM and increased joint mobility  As of the patient's last PT session, the patient has not completely achieved the goals as stated in the established plan of care

## 2020-10-26 ENCOUNTER — OFFICE VISIT (OUTPATIENT)
Dept: FAMILY MEDICINE CLINIC | Facility: CLINIC | Age: 50
End: 2020-10-26
Payer: MEDICARE

## 2020-10-26 VITALS
DIASTOLIC BLOOD PRESSURE: 68 MMHG | SYSTOLIC BLOOD PRESSURE: 112 MMHG | TEMPERATURE: 97.6 F | HEART RATE: 80 BPM | RESPIRATION RATE: 16 BRPM | BODY MASS INDEX: 42.64 KG/M2 | OXYGEN SATURATION: 98 % | HEIGHT: 66 IN | WEIGHT: 265.3 LBS

## 2020-10-26 DIAGNOSIS — H57.13 DISCOMFORT OF BOTH EYES: Primary | ICD-10-CM

## 2020-10-26 PROCEDURE — 99214 OFFICE O/P EST MOD 30 MIN: CPT | Performed by: NURSE PRACTITIONER

## 2020-10-26 RX ORDER — CARBOXYMETHYLCELLULOSE SODIUM 5 MG/ML
1 SOLUTION/ DROPS OPHTHALMIC 3 TIMES DAILY PRN
Qty: 70 ML | Refills: 1 | Status: SHIPPED | OUTPATIENT
Start: 2020-10-26 | End: 2021-02-18

## 2020-10-29 ENCOUNTER — DOCTOR'S OFFICE (OUTPATIENT)
Dept: URBAN - METROPOLITAN AREA CLINIC 136 | Facility: CLINIC | Age: 50
Setting detail: OPHTHALMOLOGY
End: 2020-10-29
Payer: COMMERCIAL

## 2020-10-29 ENCOUNTER — RX ONLY (RX ONLY)
Age: 50
End: 2020-10-29

## 2020-10-29 DIAGNOSIS — H04.121: ICD-10-CM

## 2020-10-29 DIAGNOSIS — H04.122: ICD-10-CM

## 2020-10-29 PROCEDURE — 92002 INTRM OPH EXAM NEW PATIENT: CPT | Performed by: OPHTHALMOLOGY

## 2020-10-29 ASSESSMENT — SUPERFICIAL PUNCTATE KERATITIS (SPK)
OD_SPK: INFERIOR
OS_SPK: INFERIOR

## 2020-10-29 ASSESSMENT — VISUAL ACUITY
OD_BCVA: 20/25-2
OS_BCVA: 20/20-2

## 2020-10-29 ASSESSMENT — REFRACTION_AUTOREFRACTION
OD_SPHERE: -1.75
OD_AXIS: 167
OS_CYLINDER: -2.00
OS_SPHERE: -2.75
OS_AXIS: 1
OD_CYLINDER: -0.25

## 2020-10-29 ASSESSMENT — CONFRONTATIONAL VISUAL FIELD TEST (CVF)
OD_FINDINGS: FULL
OS_FINDINGS: FULL

## 2020-10-29 ASSESSMENT — TONOMETRY
OS_IOP_MMHG: 11
OD_IOP_MMHG: 11

## 2020-10-29 ASSESSMENT — TEAR BREAK UP TIME (TBUT)
OS_TBUT: 2+
OD_TBUT: 2+

## 2020-10-29 ASSESSMENT — SPHEQUIV_DERIVED
OD_SPHEQUIV: -1.875
OS_SPHEQUIV: -3.75

## 2020-11-03 ENCOUNTER — TELEPHONE (OUTPATIENT)
Dept: FAMILY MEDICINE CLINIC | Facility: CLINIC | Age: 50
End: 2020-11-03

## 2020-11-13 NOTE — DISCHARGE INSTRUCTIONS
BP improved to baseline.   Called to discuss. DBP remains elevated above goal. NA, VM not set up. My Chart message sent.     Last 5 Patient Entered Readings                                      Current 30 Day Average: 129/87     Recent Readings 11/8/2020 11/8/2020 11/5/2020 11/5/2020 10/31/2020    SBP (mmHg) 127 132 128 143 119    DBP (mmHg) 80 83 82 86 83    Pulse 64 67 64 64 65        Hypertension Medications             hydroCHLOROthiazide (HYDRODIURIL) 25 MG tablet TAKE 1 TABLET BY MOUTH EVERY DAY    metoprolol succinate (TOPROL-XL) 100 MG 24 hr tablet TAKE ONE TABLET BY MOUTH ONCE DAILY           Schizoaffective Disorder   WHAT YOU NEED TO KNOW:   What is schizoaffective disorder? Schizoaffective disorder is a long-term mental illness that may change how you think, feel, and act around others  Schizoaffective disorder involves both psychosis (loss of reality), along with depression or geovany  You may not know what is real and what is not real    What causes schizoaffective disorder? The cause of schizoaffective disorder in not known  It is thought there may be an imbalance in chemicals that help control movement, thought, and mood  What increases my risk of schizoaffective disorder? · You are female  · You were born during the winter months  · You had psychological stress from abuse, disaster, or major life change  · Your brain did not develop normally before you were born  · You have a family member with schizophrenia, a mood disorder, or schizoaffective disorder  What are the signs and symptoms of schizoaffective disorder? · Delusions: These are false ideas  You may believe that someone is spying on you, or that you are someone famous  · Hallucinations: You see, feel, taste, hear, or smell something that is not real     · Disordered thinking and speech:  When you talk, you move from one subject to another in a way that does not make sense  You may make up your own words or sounds  · Negative symptoms: These include lack of expression, eye contact, and words  You may not be able to start and continue a planned activity  You may have little interest in work or social activities  · Depression:  You are depressed most of the day or nearly every day  You may lose or gain weight, or have trouble sleeping or concentrating  There may be feeling of hopelessness and suicidal thoughts  · Manic behavior: These are periods of increased self-esteem and energy with little to no sleep  You may talk more than usual  You may have racing thoughts or be easily distracted   You may have more interest in social activities  How is schizoaffective disorder diagnosed? Your healthcare provider will perform a psychiatric assessment  He will ask if you have a history of psychological trauma, such as physical, sexual, or mental abuse  He will ask if you were given the care that you needed when you needed it  He will ask if you have a history of alcohol or drug abuse  Your healthcare provider will ask you if you want to hurt or kill yourself or others  He will also ask about your hobbies and goals, the people in your life who support you, and how you feel about treatment  The answers to these questions help healthcare providers plan your treatment  Which medicines are used to treat schizoaffective disorder? · Antipsychotics: These help decrease psychotic symptoms or severe agitation  You may need antiparkinson medicine to control muscle stiffness, twitches, and restlessness caused by antipsychotic medicines  · Antianxiety medicine: This medicine may be given to decrease anxiety and help you feel calm and relaxed  · Antidepressants: These help decrease or stop the symptoms of depression, anxiety, and behavior problems  · Mood stabilizers: These control mood swings  · Anticonvulsants: These control seizures, decrease violent behavior, and control mood swings  · Blood pressure medicines: These may be used to help decrease motor tics (uncontrolled movements)  They may also help you feel calmer, more focused, and less irritable  · Anticholinergics: This decreases the side effects of other medicines  Which therapies are used to treat schizoaffective disorder? · Assertive community treatment:  A team of healthcare providers and support groups in your community help you with your therapy  · Cognitive behavior therapy: This therapy helps you to change certain behaviors  It will help you handle symptoms such as hallucinations and delusions   It can help you learn how to get along with others, and help you cope with and handle your disease  · Compliance therapy: This is a therapy to help find ways to make it easier for you to take your medicines and get treatment  · Counseling: This helps you learn self-care, how to decrease your symptoms, and prevent them from coming back  · Family intervention: This program lets your family be part of your therapy  · Skills training: This training helps you learn how to get along with other people  You will also learn how to do everyday activities and skills you need to live on your own  · Supported employment: This is a form of therapy where you are placed into a job that fits your skills  It will help give you independence and self-confidence  · Electroconvulsive therapy: This is a type of shock therapy, also called ECT  This therapy passes a small amount of electricity through the brain  How can I help manage schizoaffective disorder? The following may help you feel better or prevent symptoms of schizoaffective disorder from coming back:  · Find support for yourself and your family:  Talk with others to help you cope with your illness better  This may also help to improve how you relate to others  · Keep all medical appointments: This will help manage your disease and the side-effects from medicines you may be taking  · Take your medicines as directed:  Put your medicines in a pillbox placed in an area you can easily see  Use a watch with an alarm to help you remember when it is time to take your medicine  Tell your healthcare provider if you know or think you might be pregnant because your medication could affect the baby  Do not stop taking your medicines without your healthcare provider's okay  A sudden stop can cause serious medical problems  · Watch for early signs of a relapse and seek help immediately:      ¨ How you think, feel, and see things has changed      ¨ You start to behave differently, or others tell you they notice a change  ¨ You become more nervous and upset, but do not know why  ¨ You eat less or have trouble sleeping  ¨ You have little or no interest in friends or activities  What are the risks of schizoaffective disorder? Even with treatment, your symptoms may come back or not go away  If schizoaffective disorder is left untreated, your condition may get worse  It may affect the way you think of yourself and how you get along with others  Your condition may make it hard for you to do your normal activities  You may be at increased risk for diabetes and heart and lung disease  Your risk for alcohol or drug abuse increases  You may have thoughts of hurting or killing yourself or others  Where can I find support and more information? · [employer]  [address] [facility], [facility] Frieda Dent [address] , Laura Lorenz [address]  Phone: [phone]  Phone: [phone]  Web Address: Rip van Wafels  · [address] [place] [address], Public Information & Communication Branch  [address] [place] [address], [address] [facility], [address]  [place] [address]   Phone: [phone]  Phone: [phone]  Web Address: [facility]  When should I contact my healthcare provider? · You think you are having a relapse  · You are having side effects from your medicines, or they are not helping  · You are not sleeping well or are sleeping more than usual     · You cannot eat or are eating more than usual     · You have muscle spasms, stiffness, or trouble walking  · Your sad feelings or thoughts change the way you function during the day  · You have questions or concerns about your condition or care  When should I seek immediate care or call [facility]? · You feel like hurting or killing yourself or others  · You feel that your condition is getting worse  · You feel very upset, threaten someone, or feel violent       · You suddenly have changes in your vision  · You suddenly have chest pain, trouble breathing, or a fever  CARE AGREEMENT:   You have the right to help plan your care  Learn about your health condition and how it may be treated  Discuss treatment options with your caregivers to decide what care you want to receive  You always have the right to refuse treatment  The above information is an  only  It is not intended as medical advice for individual conditions or treatments  Talk to your doctor, nurse or pharmacist before following any medical regimen to see if it is safe and effective for you  © 2017 [address] [facility] Information is for End User's use only and may not be sold, redistributed or otherwise used for commercial purposes  All illustrations and images included in CareNotes® are the copyrighted property of [employer] , [employer]  or [employer]ino [employer]  Borderline Personality Disorder   WHAT YOU NEED TO KNOW:   What is borderline personality disorder? Borderline personality disorder (BPD) is a pattern of thoughts and behaviors that causes most areas of your life to be unstable  Your thoughts and behaviors seem normal to you, but not to others  What are the signs and symptoms of BPD? BPD causes shifts in moods, thoughts, and opinions from one extreme to the other  These shifts make it hard for you to function in relationships and in social settings, such as work or school   You may have many of the following:  · Impulsive or risky behavior , such as spending sprees, drug or alcohol abuse, reckless driving, binge eating, or unsafe sex     · Unstable, dramatic relationships  because your feelings shift from love to hate, or Mormonism to disgust     · Emotions  that are difficult to control, such as intense anger, extreme reactions to stress, or feeling empty or bored    · Self-harm thoughts or actions , such as suicidal thoughts, or cutting, scratching, or burning yourself     · Intense fear of abandonment  that makes you want to cling to other people    · Distorted thoughts  that make you lose touch with reality or believe that others are trying to hurt you    · A distorted self-image  that may cause sudden changes in plans or goals  How is BPD diagnosed? There is no test to diagnose BPD  Your healthcare provider may be able to diagnose BPD after he talks with you about your symptoms  He will ask if you have ever been a victim of physical, sexual, or mental abuse  Tell him if you ever want to hurt or kill yourself or others  Also tell him if you have other medical conditions or disorders  It is very common to have other disorders with BPD  Examples are drug or alcohol abuse, eating disorders, and other personality disorders  How is BPD treated? Treatment may need to take place in a hospital or clinic  Treatment works best if you learn about BPD and help create your treatment plan  Be patient  Do not give up if you have a setback  Treatment for BPD is a slow process, but over time you may improve symptoms or even recover from BPD  · Therapy  helps you learn skills to control your moods and improve your relationships  You also learn how to replace negative thoughts and beliefs with positive ones  You might work alone with a therapist, or attend group therapy with others who have BPD  · Medicines  may be used to treat symptoms that occur with BPD:     ¨ An antidepressant  called an SSRI may be used to treat anxiety and depression  ¨ Mood stabilizers  control mood swings and may decrease impulsive behavior  ¨ Antipsychotics  help regulate thought and judgment, and may reduce anxiety, paranoia, and hostility  What should I do if I want to harm myself or others? Your healthcare provider will help you create a crisis plan to follow if you have thoughts about hurting yourself or someone else  The plan will include the names of people to call during a crisis  Share your plan with friends and family   Ask someone to stay with you if a crisis occurs  If you cannot reach a person listed on your crisis plan, call the [address] [facility] at [phone]   How can I manage BPD? · Create a daily routine  Eat meals at the same time each day  Go to sleep at the same time each night  Tell your healthcare provider if you have trouble sleeping  · Reduce stress  Exercise regularly, or do other activities you enjoy  Make time to relax each day  Spend time with people and in places where you feel safe and at ease  · Set realistic goals  Your healthcare provider can help you develop short-term and long-term goals  Break large tasks into small ones so you do not feel overwhelmed  Where can I find support and more information? · [facility]  [address] [facility]  , [address] [facility] , [address] [place] [address]  Phone: [phone]  Phone: [phone]  Web Address: [email or web address]  [employer]  When should I contact my healthcare provider? · You have questions or concerns about your condition or care  When should I or someone close to me seek immediate care or call 91? · You lose touch with reality  You see, hear, or feel things that are not real      · You want to harm or kill yourself or someone else  CARE AGREEMENT:   You have the right to help plan your care  Learn about your health condition and how it may be treated  Discuss treatment options with your caregivers to decide what care you want to receive  You always have the right to refuse treatment  The above information is an  only  It is not intended as medical advice for individual conditions or treatments  Talk to your doctor, nurse or pharmacist before following any medical regimen to see if it is safe and effective for you  © 2017 [address] [employer] Information is for End User's use only and may not be sold, redistributed or otherwise used for commercial purposes   All illustrations and images included in CareNotes® are the copyrighted property of [employer]  or [employer]ino [employer]heng

## 2020-11-16 DIAGNOSIS — I10 HYPERTENSION, UNSPECIFIED TYPE: ICD-10-CM

## 2020-11-16 RX ORDER — FUROSEMIDE 20 MG/1
20 TABLET ORAL DAILY
Qty: 30 TABLET | Refills: 1 | Status: SHIPPED | OUTPATIENT
Start: 2020-11-16 | End: 2021-01-14

## 2020-12-02 ENCOUNTER — DOCTOR'S OFFICE (OUTPATIENT)
Dept: URBAN - METROPOLITAN AREA CLINIC 136 | Facility: CLINIC | Age: 50
Setting detail: OPHTHALMOLOGY
End: 2020-12-02
Payer: COMMERCIAL

## 2020-12-02 ENCOUNTER — RX ONLY (RX ONLY)
Age: 50
End: 2020-12-02

## 2020-12-02 DIAGNOSIS — H04.121: ICD-10-CM

## 2020-12-02 DIAGNOSIS — H04.122: ICD-10-CM

## 2020-12-02 PROCEDURE — 92012 INTRM OPH EXAM EST PATIENT: CPT | Performed by: OPHTHALMOLOGY

## 2020-12-02 PROCEDURE — 83861 MICROFLUID ANALY TEARS: CPT | Performed by: OPHTHALMOLOGY

## 2020-12-02 ASSESSMENT — TEAR BREAK UP TIME (TBUT)
OS_TBUT: T
OD_TBUT: T

## 2020-12-02 ASSESSMENT — TONOMETRY
OS_IOP_MMHG: 20
OD_IOP_MMHG: 20

## 2020-12-02 ASSESSMENT — REFRACTION_AUTOREFRACTION
OS_AXIS: 1
OS_SPHERE: -2.75
OS_CYLINDER: -2.00
OD_AXIS: 167
OD_CYLINDER: -0.25
OD_SPHERE: -1.75

## 2020-12-02 ASSESSMENT — SPHEQUIV_DERIVED
OS_SPHEQUIV: -3.75
OD_SPHEQUIV: -1.875

## 2020-12-02 ASSESSMENT — REFRACTION_CURRENTRX
OS_SPHERE: -3.00
OS_ADD: +2.25
OS_AXIS: 168
OD_AXIS: 092
OS_OVR_VA: 20/
OD_SPHERE: -2.50
OD_ADD: +2.25
OS_CYLINDER: -0.50
OS_VPRISM_DIRECTION: PROGS
OD_VPRISM_DIRECTION: PROGS
OD_CYLINDER: -0.50
OD_OVR_VA: 20/

## 2020-12-02 ASSESSMENT — SUPERFICIAL PUNCTATE KERATITIS (SPK)
OS_SPK: ABSENT
OD_SPK: ABSENT

## 2020-12-02 ASSESSMENT — CONFRONTATIONAL VISUAL FIELD TEST (CVF)
OD_FINDINGS: FULL
OS_FINDINGS: FULL

## 2020-12-02 ASSESSMENT — VISUAL ACUITY
OD_BCVA: 20/25-2
OS_BCVA: 20/20-2

## 2020-12-07 ENCOUNTER — TRANSCRIBE ORDERS (OUTPATIENT)
Dept: PHYSICAL THERAPY | Facility: CLINIC | Age: 50
End: 2020-12-07

## 2020-12-07 ENCOUNTER — EVALUATION (OUTPATIENT)
Dept: PHYSICAL THERAPY | Facility: CLINIC | Age: 50
End: 2020-12-07
Payer: MEDICARE

## 2020-12-07 DIAGNOSIS — M72.2 PLANTAR FASCIITIS, LEFT: Primary | ICD-10-CM

## 2020-12-07 PROCEDURE — 97161 PT EVAL LOW COMPLEX 20 MIN: CPT | Performed by: PHYSICAL THERAPIST

## 2020-12-07 PROCEDURE — 97110 THERAPEUTIC EXERCISES: CPT | Performed by: PHYSICAL THERAPIST

## 2020-12-11 ENCOUNTER — OFFICE VISIT (OUTPATIENT)
Dept: PHYSICAL THERAPY | Facility: CLINIC | Age: 50
End: 2020-12-11
Payer: MEDICARE

## 2020-12-11 DIAGNOSIS — M72.2 PLANTAR FASCIITIS: ICD-10-CM

## 2020-12-11 DIAGNOSIS — M72.2 PLANTAR FASCIITIS, LEFT: Primary | ICD-10-CM

## 2020-12-11 PROCEDURE — 97110 THERAPEUTIC EXERCISES: CPT

## 2020-12-11 PROCEDURE — 97140 MANUAL THERAPY 1/> REGIONS: CPT

## 2020-12-14 ENCOUNTER — OFFICE VISIT (OUTPATIENT)
Dept: PHYSICAL THERAPY | Facility: CLINIC | Age: 50
End: 2020-12-14
Payer: MEDICARE

## 2020-12-14 DIAGNOSIS — M72.2 PLANTAR FASCIITIS, LEFT: Primary | ICD-10-CM

## 2020-12-14 PROCEDURE — 97110 THERAPEUTIC EXERCISES: CPT

## 2020-12-14 PROCEDURE — 97140 MANUAL THERAPY 1/> REGIONS: CPT

## 2020-12-16 ENCOUNTER — OFFICE VISIT (OUTPATIENT)
Dept: PHYSICAL THERAPY | Facility: CLINIC | Age: 50
End: 2020-12-16
Payer: MEDICARE

## 2020-12-16 DIAGNOSIS — M72.2 PLANTAR FASCIITIS, LEFT: Primary | ICD-10-CM

## 2020-12-16 PROCEDURE — 97140 MANUAL THERAPY 1/> REGIONS: CPT | Performed by: PHYSICAL MEDICINE & REHABILITATION

## 2020-12-16 PROCEDURE — 97110 THERAPEUTIC EXERCISES: CPT | Performed by: PHYSICAL MEDICINE & REHABILITATION

## 2020-12-18 ENCOUNTER — OFFICE VISIT (OUTPATIENT)
Dept: FAMILY MEDICINE CLINIC | Facility: CLINIC | Age: 50
End: 2020-12-18
Payer: MEDICARE

## 2020-12-18 VITALS
BODY MASS INDEX: 44.6 KG/M2 | HEIGHT: 65 IN | TEMPERATURE: 96.4 F | SYSTOLIC BLOOD PRESSURE: 150 MMHG | RESPIRATION RATE: 20 BRPM | DIASTOLIC BLOOD PRESSURE: 100 MMHG | OXYGEN SATURATION: 98 % | HEART RATE: 80 BPM | WEIGHT: 267.7 LBS

## 2020-12-18 DIAGNOSIS — I10 HYPERTENSION, UNSPECIFIED TYPE: Primary | ICD-10-CM

## 2020-12-18 DIAGNOSIS — E03.9 HYPOTHYROIDISM, UNSPECIFIED TYPE: Primary | ICD-10-CM

## 2020-12-18 DIAGNOSIS — R73.09 ABNORMAL GLUCOSE: ICD-10-CM

## 2020-12-18 DIAGNOSIS — I10 HYPERTENSION, UNSPECIFIED TYPE: ICD-10-CM

## 2020-12-18 DIAGNOSIS — M35.00 SJOGREN'S SYNDROME (HCC): ICD-10-CM

## 2020-12-18 DIAGNOSIS — I10 BENIGN ESSENTIAL HYPERTENSION: ICD-10-CM

## 2020-12-18 DIAGNOSIS — E03.9 HYPOTHYROIDISM, UNSPECIFIED TYPE: ICD-10-CM

## 2020-12-18 LAB
ALBUMIN SERPL BCP-MCNC: 3.5 G/DL (ref 3.5–5)
ALP SERPL-CCNC: 73 U/L (ref 46–116)
ALT SERPL W P-5'-P-CCNC: 20 U/L (ref 12–78)
ANION GAP SERPL CALCULATED.3IONS-SCNC: 6 MMOL/L (ref 4–13)
AST SERPL W P-5'-P-CCNC: 13 U/L (ref 5–45)
BASOPHILS # BLD AUTO: 0.05 THOUSANDS/ΜL (ref 0–0.1)
BASOPHILS NFR BLD AUTO: 0 % (ref 0–1)
BILIRUB SERPL-MCNC: 0.27 MG/DL (ref 0.2–1)
BUN SERPL-MCNC: 14 MG/DL (ref 5–25)
CALCIUM SERPL-MCNC: 9.4 MG/DL (ref 8.3–10.1)
CHLORIDE SERPL-SCNC: 108 MMOL/L (ref 100–108)
CHOLEST SERPL-MCNC: 243 MG/DL (ref 50–200)
CO2 SERPL-SCNC: 26 MMOL/L (ref 21–32)
CREAT SERPL-MCNC: 0.62 MG/DL (ref 0.6–1.3)
EOSINOPHIL # BLD AUTO: 0.04 THOUSAND/ΜL (ref 0–0.61)
EOSINOPHIL NFR BLD AUTO: 0 % (ref 0–6)
ERYTHROCYTE [DISTWIDTH] IN BLOOD BY AUTOMATED COUNT: 12.5 % (ref 11.6–15.1)
EST. AVERAGE GLUCOSE BLD GHB EST-MCNC: 94 MG/DL
GFR SERPL CREATININE-BSD FRML MDRD: 106 ML/MIN/1.73SQ M
GLUCOSE P FAST SERPL-MCNC: 85 MG/DL (ref 65–99)
HBA1C MFR BLD: 4.9 %
HCT VFR BLD AUTO: 47.6 % (ref 34.8–46.1)
HDLC SERPL-MCNC: 66 MG/DL
HGB BLD-MCNC: 14.9 G/DL (ref 11.5–15.4)
IMM GRANULOCYTES # BLD AUTO: 0.08 THOUSAND/UL (ref 0–0.2)
IMM GRANULOCYTES NFR BLD AUTO: 1 % (ref 0–2)
LDLC SERPL CALC-MCNC: 155 MG/DL (ref 0–100)
LYMPHOCYTES # BLD AUTO: 2.51 THOUSANDS/ΜL (ref 0.6–4.47)
LYMPHOCYTES NFR BLD AUTO: 23 % (ref 14–44)
MCH RBC QN AUTO: 30.8 PG (ref 26.8–34.3)
MCHC RBC AUTO-ENTMCNC: 31.3 G/DL (ref 31.4–37.4)
MCV RBC AUTO: 99 FL (ref 82–98)
MONOCYTES # BLD AUTO: 0.92 THOUSAND/ΜL (ref 0.17–1.22)
MONOCYTES NFR BLD AUTO: 8 % (ref 4–12)
NEUTROPHILS # BLD AUTO: 7.53 THOUSANDS/ΜL (ref 1.85–7.62)
NEUTS SEG NFR BLD AUTO: 68 % (ref 43–75)
NONHDLC SERPL-MCNC: 177 MG/DL
NRBC BLD AUTO-RTO: 0 /100 WBCS
PMV BLD AUTO: 11.2 FL (ref 8.9–12.7)
POTASSIUM SERPL-SCNC: 3.8 MMOL/L (ref 3.5–5.3)
PROT SERPL-MCNC: 7.6 G/DL (ref 6.4–8.2)
RBC # BLD AUTO: 4.83 MILLION/UL (ref 3.81–5.12)
SODIUM SERPL-SCNC: 140 MMOL/L (ref 136–145)
T4 FREE SERPL-MCNC: 1.13 NG/DL (ref 0.76–1.46)
TRIGL SERPL-MCNC: 110 MG/DL
TSH SERPL DL<=0.05 MIU/L-ACNC: 12.6 UIU/ML (ref 0.36–3.74)
WBC # BLD AUTO: 11.13 THOUSAND/UL (ref 4.31–10.16)

## 2020-12-18 PROCEDURE — 99214 OFFICE O/P EST MOD 30 MIN: CPT | Performed by: NURSE PRACTITIONER

## 2020-12-18 PROCEDURE — 83036 HEMOGLOBIN GLYCOSYLATED A1C: CPT | Performed by: NURSE PRACTITIONER

## 2020-12-18 PROCEDURE — 36415 COLL VENOUS BLD VENIPUNCTURE: CPT | Performed by: NURSE PRACTITIONER

## 2020-12-18 PROCEDURE — G0439 PPPS, SUBSEQ VISIT: HCPCS | Performed by: NURSE PRACTITIONER

## 2020-12-18 PROCEDURE — 80061 LIPID PANEL: CPT | Performed by: NURSE PRACTITIONER

## 2020-12-18 PROCEDURE — 84443 ASSAY THYROID STIM HORMONE: CPT | Performed by: NURSE PRACTITIONER

## 2020-12-18 PROCEDURE — 85025 COMPLETE CBC W/AUTO DIFF WBC: CPT | Performed by: NURSE PRACTITIONER

## 2020-12-18 PROCEDURE — 80053 COMPREHEN METABOLIC PANEL: CPT | Performed by: NURSE PRACTITIONER

## 2020-12-18 PROCEDURE — 84439 ASSAY OF FREE THYROXINE: CPT | Performed by: NURSE PRACTITIONER

## 2020-12-18 RX ORDER — LEVOTHYROXINE SODIUM 0.05 MG/1
50 TABLET ORAL
Qty: 30 TABLET | Refills: 5 | Status: SHIPPED | OUTPATIENT
Start: 2020-12-18 | End: 2021-06-09 | Stop reason: SDUPTHER

## 2020-12-18 RX ORDER — RABEPRAZOLE SODIUM 20 MG/1
TABLET, DELAYED RELEASE ORAL
COMMUNITY
Start: 2020-11-10 | End: 2021-02-17 | Stop reason: ALTCHOICE

## 2020-12-18 RX ORDER — AMLODIPINE BESYLATE 5 MG/1
5 TABLET ORAL DAILY
Qty: 30 TABLET | Refills: 5 | Status: SHIPPED | OUTPATIENT
Start: 2020-12-18 | End: 2021-06-09 | Stop reason: SDUPTHER

## 2020-12-18 RX ORDER — PILOCARPINE HYDROCHLORIDE 5 MG/1
5 TABLET, FILM COATED ORAL 2 TIMES DAILY
Qty: 60 TABLET | Refills: 5 | Status: SHIPPED | OUTPATIENT
Start: 2020-12-18 | End: 2021-06-25

## 2020-12-18 RX ORDER — IBUPROFEN AND FAMOTIDINE 800; 26.6 MG/1; MG/1
TABLET, COATED ORAL
COMMUNITY
Start: 2020-12-09 | End: 2021-02-17 | Stop reason: ALTCHOICE

## 2020-12-18 RX ORDER — NEOMYCIN SULFATE, POLYMYXIN B SULFATE, AND DEXAMETHASONE 3.5; 10000; 1 MG/G; [USP'U]/G; MG/G
OINTMENT OPHTHALMIC
COMMUNITY
Start: 2020-10-29 | End: 2021-02-17 | Stop reason: ALTCHOICE

## 2020-12-21 ENCOUNTER — APPOINTMENT (OUTPATIENT)
Dept: PHYSICAL THERAPY | Facility: CLINIC | Age: 50
End: 2020-12-21
Payer: MEDICARE

## 2020-12-23 ENCOUNTER — OFFICE VISIT (OUTPATIENT)
Dept: PHYSICAL THERAPY | Facility: CLINIC | Age: 50
End: 2020-12-23
Payer: MEDICARE

## 2020-12-23 DIAGNOSIS — M72.2 PLANTAR FASCIITIS, LEFT: Primary | ICD-10-CM

## 2020-12-23 PROCEDURE — 97112 NEUROMUSCULAR REEDUCATION: CPT | Performed by: PHYSICAL THERAPIST

## 2020-12-23 PROCEDURE — 97140 MANUAL THERAPY 1/> REGIONS: CPT | Performed by: PHYSICAL THERAPIST

## 2020-12-23 PROCEDURE — 97110 THERAPEUTIC EXERCISES: CPT | Performed by: PHYSICAL THERAPIST

## 2020-12-28 ENCOUNTER — OFFICE VISIT (OUTPATIENT)
Dept: PHYSICAL THERAPY | Facility: CLINIC | Age: 50
End: 2020-12-28
Payer: MEDICARE

## 2020-12-28 DIAGNOSIS — M72.2 PLANTAR FASCIITIS, LEFT: Primary | ICD-10-CM

## 2020-12-28 PROCEDURE — 97530 THERAPEUTIC ACTIVITIES: CPT | Performed by: PHYSICAL THERAPIST

## 2020-12-28 PROCEDURE — 97110 THERAPEUTIC EXERCISES: CPT | Performed by: PHYSICAL THERAPIST

## 2020-12-28 PROCEDURE — 97140 MANUAL THERAPY 1/> REGIONS: CPT | Performed by: PHYSICAL THERAPIST

## 2020-12-30 ENCOUNTER — OFFICE VISIT (OUTPATIENT)
Dept: PHYSICAL THERAPY | Facility: CLINIC | Age: 50
End: 2020-12-30
Payer: MEDICARE

## 2020-12-30 DIAGNOSIS — M72.2 PLANTAR FASCIITIS, LEFT: Primary | ICD-10-CM

## 2020-12-30 PROCEDURE — 97112 NEUROMUSCULAR REEDUCATION: CPT | Performed by: PHYSICAL THERAPIST

## 2020-12-30 PROCEDURE — 97140 MANUAL THERAPY 1/> REGIONS: CPT | Performed by: PHYSICAL THERAPIST

## 2020-12-30 PROCEDURE — 97110 THERAPEUTIC EXERCISES: CPT | Performed by: PHYSICAL THERAPIST

## 2020-12-31 ENCOUNTER — TRANSCRIBE ORDERS (OUTPATIENT)
Dept: ADMINISTRATIVE | Facility: HOSPITAL | Age: 50
End: 2020-12-31

## 2020-12-31 DIAGNOSIS — M72.2 PLANTAR FASCIAL FIBROMATOSIS: Primary | ICD-10-CM

## 2021-01-02 ENCOUNTER — TELEMEDICINE (OUTPATIENT)
Dept: FAMILY MEDICINE CLINIC | Facility: CLINIC | Age: 51
End: 2021-01-02
Payer: MEDICARE

## 2021-01-02 ENCOUNTER — TELEPHONE (OUTPATIENT)
Dept: OTHER | Facility: OTHER | Age: 51
End: 2021-01-02

## 2021-01-02 DIAGNOSIS — Z20.822 SUSPECTED COVID-19 VIRUS INFECTION: Primary | ICD-10-CM

## 2021-01-02 PROCEDURE — 99212 OFFICE O/P EST SF 10 MIN: CPT | Performed by: FAMILY MEDICINE

## 2021-01-02 NOTE — PROGRESS NOTES
COVID-19 Virtual Visit     Assessment/Plan:    Problem List Items Addressed This Visit     None      Visit Diagnoses     Suspected COVID-19 virus infection    -  Primary         Disposition:     I referred patient to one of our centralized sites for a COVID-19 swab  Wash your hands frequently and cover your mouth when you sneeze or cough  Wear a mask and try to isolate yourself from others in your household, in a 'sick room"  Clean & disinfect touched surfaces, daily  Do not share dishes, drinking glasses, cups, eating utensils,towels or bedding with other people in your home  Try to use a separate bathroom  If you must interact with family members, everyone should wear masks and you need to avoid close contact  Use acetaminophen 650mg orally, every 6 hours for fever, headache or muscle pains  Drink fluids to stay well hydrated  Monitor your temperature if you have a thermometer  If you develop mild shortness of breath or chest pressure, proning can be helpful  If shortness of breath or chest pressure worsens, call your doctor or call 911 and get to an Emergency Room  If you are not severe, you still need to stay in touch with your doctor  Overall, quarantine is recommended for 14 days, after the development of symptoms  If you have a positive test for COVID-19, recommendation is for continued quarantine and isolation for 10 days since symptoms began AND improvement in respiratory symptoms AND resolution of fever for at least 24 hours without the use of fever reducing medications  I have spent 15 minutes directly with the patient  Greater than 50% of this time was spent in counseling/coordination of care regarding: instructions for management, importance of treatment compliance and impressions          Encounter provider Lisa Diaz MD    Provider located at [facility] AT [address] [id number]    Recent Visits  No visits were found meeting these conditions  Showing recent visits within past 7 days and meeting all other requirements     Today's Visits  Date Type Provider Dept   01/02/21 Telemedicine Jazzmine Galvin  [address] today's visits and meeting all other requirements     Future Appointments  No visits were found meeting these conditions  Showing future appointments within next 150 days and meeting all other requirements      This virtual check-in was done via [employer] and patient was informed that this is a secure, HIPAA-compliant platform  She agrees to proceed  Patient agrees to participate in a virtual check in via telephone or video visit instead of presenting to the office to address urgent/immediate medical needs  Patient is aware this is a billable service  After connecting through [facility], the patient was identified by name and date of birth  Michoacano Payne was informed that this was a telemedicine visit and that the exam was being conducted confidentially over secure lines  My office door was closed  No one else was in the room  Michoacano Payne acknowledged consent and understanding of privacy and security of the telemedicine visit  I informed the patient that I have reviewed her record in Epic and presented the opportunity for her to ask any questions regarding the visit today  The patient agreed to participate  Subjective:   Michoacano Payne is a 53 y o  female who is concerned about COVID-19  Date of symptom onset: 12/30/2020    Patient's symptoms include nasal congestion, sore throat, chest tightness, nausea, diarrhea, myalgias and headache  Patient denies fever, chills, fatigue, anosmia, loss of taste, cough, shortness of breath and vomiting       Exposure:   Contact with a person who is under investigation (PUI) for or who is positive for COVID-19 within the last 14 days?: No    Hospitalized recently for fever and/or lower respiratory symptoms?: No      Currently a healthcare worker that is involved in direct patient care?: No      Works in a special setting where the risk of COVID-19 transmission may be high? (this may include long-term care, correctional and intermediate facilities; homeless shelters; assisted-living facilities and group homes ): No      Resident in a special setting where the risk of COVID-19 transmission may be high? (this may include long-term care, correctional and intermediate facilities; homeless shelters; assisted-living facilities and group homes ): No      No results found for: YASMANY [address]  Past Medical History:   Diagnosis Date    Anxiety     Arthritis     oa cassandra knees    Asthma     good control- no medications    Yan's esophagus     Bipolar affective disorder ([facility] [address] )     Breathing difficulty     After vascular surgery was diagnosed with sleep apnea    Chronic pain disorder     lumbar    CPAP (continuous positive airway pressure) dependence     Degenerative disc disease at L5-S1 level     Deliberate self-cutting     Depression 09/16/2008    Disease of thyroid gland     hypo    MARTINEZ (dyspnea on exertion)     Drug overdose 10/28/2008    suicide attempt    Dysphagia     Dyspnea     Edema     BLE    GERD (gastroesophageal reflux disease)     High blood pressure     Knee pain, bilateral     Especially right    Migraines     Obese     Overactive bladder     Sjogren's disease ([facility] [address] )     Sleep apnea     Stress incontinence     Suicidal ideations     Umbilical hernia     surgical repair today 4/24/2019    Use of cane as ambulatory aid     awaiting b/l knee replacement    Wears glasses      Past Surgical History:   Procedure Laterality Date    BREAST BIOPSY Right 1989    benign    CARPAL TUNNEL RELEASE Left     CHOLECYSTECTOMY  05/2003    Laparoscopic    COLONOSCOPY      01/12/2009    DILATION AND CURETTAGE OF UTERUS      ELBOW SURGERY Left     x2   No hardware    ESOPHAGOGASTRODUODENOSCOPY  FOOT SURGERY Left     Plantar fasciotomy    HYSTERECTOMY      laporoscopic, partial    KNEE ARTHROSCOPY Bilateral     OOPHORECTOMY Left 10/2015    CO ANAL SPHINCTEROTOMY N/A 8/31/2018    Procedure: EUA, LEFT PARTIAL INTERNAL SPHINCTEROTOMY;  Surgeon: Rich Atkins MD;  Location: [address] [place] [address] MAIN OR;  Service: Colorectal    CO REPAIR UMBILICAL ORHY,7+V/G,DYCSH N/A 4/24/2019    Procedure: REPAIR HERNIA UMBILICAL LAPAROSCOPIC W/ ROBOTICS;  Surgeon: Christiane Schirmer, MD;  [facility];  Service: General    REDUCTION MAMMAPLASTY Bilateral 1999    REPAIR LACERATION Left     left hand-5/18/2009    TONSILECTOMY AND ADNOIDECTOMY      TONSILLECTOMY      VEIN LIGATION Bilateral     WISDOM TOOTH EXTRACTION       Current Outpatient Medications   Medication Sig Dispense Refill    amLODIPine (NORVASC) 5 mg tablet Take 1 tablet (5 mg total) by mouth daily 30 tablet 5    buPROPion (WELLBUTRIN XL) 300 mg 24 hr tablet       carboxymethylcellulose 0 5 % SOLN Administer 1 drop to both eyes 3 (three) times a day as needed for dry eyes 70 mL 1    colestipol (COLESTID) 1 g tablet Take 1 tablet (1 g total) by mouth 2 (two) times a day 60 tablet 0    dicyclomine (BENTYL) 10 mg capsule TAKE 1 CAPSULE BY MOUTH EVERY 6 HOURS AS NEEDED FOR ABDOMINAL PAIN      divalproex sodium (DEPAKOTE) 500 mg EC tablet       Duexis 800-26 6 MG TABS       estradiol (ESTRACE) 1 mg tablet Take 1 tablet (1 mg total) by mouth daily 90 tablet 3    furosemide (LASIX) 20 mg tablet TAKE 1 TABLET (20 MG TOTAL) BY MOUTH DAILY 30 tablet 1    hydrocortisone 2 5 % ointment Apply topically 2 (two) times a day (Patient not taking: Reported on 12/18/2020) 30 g 0    hydrOXYzine HCL (ATARAX) 10 mg tablet Take 10 mg by mouth every 6 (six) hours as needed for itching      levothyroxine 50 mcg tablet Take 1 tablet (50 mcg total) by mouth daily in the early morning 30 tablet 5    loratadine (CLARITIN) 10 mg tablet Take 1 tablet (10 mg total) by mouth daily 30 tablet 5    losartan (COZAAR) 50 mg tablet Take 50 mg by mouth daily       lurasidone (LATUDA) 80 mg tablet Take 60 mg by mouth daily with dinner       meclizine (ANTIVERT) 25 mg tablet Take 1 tablet (25 mg total) by mouth 3 (three) times a day as needed for dizziness (Patient not taking: Reported on 12/18/2020) 21 tablet 0    metoprolol tartrate (LOPRESSOR) 25 mg tablet Take 1 tablet (25 mg total) by mouth 2 (two) times a day 60 tablet 5    neomycin-polymyxin-dexamethasone (MAXITROL) 0 35%-10,000 units/g-0 1%       pantoprazole (PROTONIX) 40 mg tablet Take 40 mg by mouth 2 (two) times a day      pilocarpine (SALAGEN) 5 mg tablet Take 1 tablet (5 mg total) by mouth 2 (two) times a day 60 tablet 5    RABEprazole (ACIPHEX) 20 MG tablet rabeprazole 20 mg tablet,delayed release      topiramate (TOPAMAX) 100 mg tablet Take 100 mg by mouth 2 (two) times a day      TOVIAZ 4 MG TB24 Take 1 tablet (4 mg total) by mouth daily  2    traZODone (DESYREL) 150 mg tablet        No current facility-administered medications for this visit  Allergies   Allergen Reactions    Percocet [Oxycodone-Acetaminophen] Headache     Severe headaches       Review of Systems   Constitutional: Negative for activity change, appetite change, chills, fatigue and fever  HENT: Positive for congestion and sore throat  Respiratory: Positive for chest tightness  Negative for cough and shortness of breath  Gastrointestinal: Positive for diarrhea and nausea  Negative for vomiting  Musculoskeletal: Positive for myalgias  Neurological: Positive for headaches  Negative for light-headedness  Objective: On the telephone, she is alert,oriented and no cough or conversational dyspnea  There were no vitals filed for this visit  VIRTUAL VISIT DISCLAIMER    Iain Mayorga acknowledges that she has consented to an online visit or consultation   She understands that the online visit is based solely on information provided by her, and that, in the absence of a face-to-face physical evaluation by the physician, the diagnosis she receives is both limited and provisional in terms of accuracy and completeness  This is not intended to replace a full medical face-to-face evaluation by the physician  Melissa Cisneros understands and accepts these terms

## 2021-01-04 ENCOUNTER — TELEMEDICINE (OUTPATIENT)
Dept: FAMILY MEDICINE CLINIC | Facility: CLINIC | Age: 51
End: 2021-01-04
Payer: MEDICARE

## 2021-01-04 DIAGNOSIS — R06.2 WHEEZING: Primary | ICD-10-CM

## 2021-01-04 DIAGNOSIS — E66.01 MORBID OBESITY WITH BMI OF 40.0-44.9, ADULT (HCC): ICD-10-CM

## 2021-01-04 DIAGNOSIS — U07.1 COVID-19 VIRUS INFECTION: ICD-10-CM

## 2021-01-04 DIAGNOSIS — J44.1 CHRONIC OBSTRUCTIVE PULMONARY DISEASE WITH ACUTE EXACERBATION (HCC): ICD-10-CM

## 2021-01-04 DIAGNOSIS — Z20.822 SUSPECTED COVID-19 VIRUS INFECTION: ICD-10-CM

## 2021-01-04 LAB — SARS-COV-2 N GENE RESP QL NAA+PROBE: POSITIVE

## 2021-01-04 PROCEDURE — U0003 INFECTIOUS AGENT DETECTION BY NUCLEIC ACID (DNA OR RNA); SEVERE ACUTE RESPIRATORY SYNDROME CORONAVIRUS 2 (SARS-COV-2) (CORONAVIRUS DISEASE [COVID-19]), AMPLIFIED PROBE TECHNIQUE, MAKING USE OF HIGH THROUGHPUT TECHNOLOGIES AS DESCRIBED BY CMS-2020-01-R: HCPCS | Performed by: FAMILY MEDICINE

## 2021-01-04 PROCEDURE — 99214 OFFICE O/P EST MOD 30 MIN: CPT | Performed by: FAMILY MEDICINE

## 2021-01-04 RX ORDER — ACETAMINOPHEN 325 MG/1
650 TABLET ORAL ONCE AS NEEDED
Status: CANCELLED | OUTPATIENT
Start: 2021-01-06

## 2021-01-04 RX ORDER — ALBUTEROL SULFATE 90 UG/1
3 AEROSOL, METERED RESPIRATORY (INHALATION) ONCE AS NEEDED
Status: CANCELLED | OUTPATIENT
Start: 2021-01-06

## 2021-01-04 RX ORDER — ALBUTEROL SULFATE 90 UG/1
2 AEROSOL, METERED RESPIRATORY (INHALATION) EVERY 4 HOURS PRN
Qty: 8.5 G | Refills: 1 | Status: SHIPPED | OUTPATIENT
Start: 2021-01-04 | End: 2021-08-28

## 2021-01-04 RX ORDER — SODIUM CHLORIDE 9 MG/ML
20 INJECTION, SOLUTION INTRAVENOUS ONCE
Status: CANCELLED | OUTPATIENT
Start: 2021-01-06

## 2021-01-04 NOTE — PROGRESS NOTES
COVID-19 Virtual Visit     Assessment/Plan:    Problem List Items Addressed This Visit        Respiratory    Chronic obstructive pulmonary disease with acute exacerbation (HCC)    Relevant Medications    albuterol (ProAir HFA) 90 mcg/act inhaler       Other    COVID-19 virus infection    Morbid obesity with BMI of 40 0-44 9, adult ([facility] [address] )      Other Visit Diagnoses     Wheezing    -  Primary    Relevant Medications    albuterol (ProAir HFA) 90 mcg/act inhaler         Disposition:     I recommended continued isolation until at least 24 hours have passed since recovery defined as resolution of fever without the use of fever-reducing medications and improvement in respiratory symptoms (e g , cough, shortness of breath) AND 10 days have passed since onset of symptoms  Patient is a candidate for Bamlanivimab  They were counseled in regards to risks, benefits, and side effects of this infusion  Possible side effects of bamlanivimab are: Allergic reactions   Allergic reactions can happen during and after infusion with bamlanivimab which include:    Fever, chills, nausea, headache, shortness of breath, low blood pressure, wheezing, swelling of your lips, face, or throat, rash including hives, itching, muscle aches, and dizziness  The side effects of getting any medicine by vein may include brief pain, bleeding, bruising of the skin, soreness, swelling, and possible infection at the infusion site  These are not all the possible side effects of bamlanivimab  Not a lot of people have been given bamlanivimab  Serious and unexpected side effects may happen  Brandenetta Mooring is still being studied so it is possible that all of the risks are not known at this time   Please note that this drug was approved under the Emergency Use Authorization of the FDA and has not gone through the full, formal FDA approval process    It is possible that bamlanivimab could interfere with your body's own ability to fight off a future infection of SARS-CoV-2  Similarly, bamlanivimab may reduce your bodys immune response to a vaccine for SARS-CoV-2  Specific studies have not been conducted to address these possible risks  Currently there is no data or safety or efficacy of COVID-19 vaccination in persons who received monoclonal antibodies as part of COVID-19 treatment  Treatment should be deferred for at least 90 days to avoid interference of the treatment with vaccine-induced immune responses (this is based on estimated half-life of therapies and evidence suggesting reinfection is uncommon within 90 days of initial infection)  The patient consents to proceed with bamlanivimab infusion  *[email or web address]  mckenzie  com/eua/[email or web address]-eua-factsheet-hcp  pdf    I have spent 30 minutes directly with the patient  Greater than 50% of this time was spent in counseling/coordination of care regarding: diagnostic results, risks and benefits of treatment options, instructions for management, importance of treatment compliance, risk factor reductions and impressions  Encounter provider Zaki Oliva MD    Provider located at [facility] AT [address] [facility]  [address] [place] [id number]    Recent Visits  Date Type Provider Dept   01/02/21 Telemedicine Zaki Oliva  [facility] recent visits within past 7 days and meeting all other requirements     Today's Visits  Date Type Provider Dept   01/04/21 Telemedicine Zaki Oliva  [facility] today's visits and meeting all other requirements     Future Appointments  No visits were found meeting these conditions  Showing future appointments within next 150 days and meeting all other requirements      This virtual check-in was done via [employer] and patient was informed that this is a secure, HIPAA-compliant platform  She agrees to proceed      Patient agrees to participate in a virtual check in via telephone or video visit instead of presenting to the office to address urgent/immediate medical needs  Patient is aware this is a billable service  After connecting through [facility], the patient was identified by name and date of birth  Melissa Cisneros was informed that this was a telemedicine visit and that the exam was being conducted confidentially over secure lines  My office door was closed  No one else was in the room  Melissa Cisneros acknowledged consent and understanding of privacy and security of the telemedicine visit  I informed the patient that I have reviewed her record in Epic and presented the opportunity for her to ask any questions regarding the visit today  The patient agreed to participate  Subjective:   Melissa Cisneros is a 53 y o  female who has been screened for COVID-19  Symptom change since last report: improving  Date of symptom onset: 12/30/2020    Patient's symptoms include chills, fatigue, nasal congestion, rhinorrhea, loss of taste, cough, shortness of breath, chest tightness, nausea, diarrhea, myalgias and headache  Patient denies fever, sore throat, anosmia and vomiting  Livia Qureshi has been staying home and has isolated themselves in her home  She is taking care to not share personal items and is cleaning all surfaces that are touched often, like counters, tabletops, and doorknobs using household cleaning sprays or wipes  She is wearing a mask when she leaves her room  Thinks that she has gotten the Covid from the woman across the brown  Patient is posiitive for Covid-19  Needs to quarantine, at home, for 10 days and cannot return to work until it has been 10 days and she is without fever for 24 hours and feels well enough to go back to work      Lab Results   Component Value Date    SARSCOV2 Positive (A) 01/04/2021     Past Medical History:   Diagnosis Date    Anxiety     Arthritis     oa cassandra knees    Asthma     good control- no medications    Geraldine esophagus     Bipolar affective disorder (HCC)     Breathing difficulty     After vascular surgery was diagnosed with sleep apnea    Chronic pain disorder     lumbar    CPAP (continuous positive airway pressure) dependence     Degenerative disc disease at L5-S1 level     Deliberate self-cutting     Depression 09/16/2008    Disease of thyroid gland     hypo    MARTINEZ (dyspnea on exertion)     Drug overdose 10/28/2008    suicide attempt    Dysphagia     Dyspnea     Edema     BLE    GERD (gastroesophageal reflux disease)     High blood pressure     Knee pain, bilateral     Especially right    Migraines     Obese     Overactive bladder     Sjogren's disease ([address] )     Sleep apnea     Stress incontinence     Suicidal ideations     Umbilical hernia     surgical repair today 4/24/2019    Use of cane as ambulatory aid     awaiting b/l knee replacement    Wears glasses      Past Surgical History:   Procedure Laterality Date    BREAST BIOPSY Right 1989    benign    CARPAL TUNNEL RELEASE Left     CHOLECYSTECTOMY  05/2003    Laparoscopic    COLONOSCOPY      01/12/2009    DILATION AND CURETTAGE OF UTERUS      ELBOW SURGERY Left     x2   No hardware    ESOPHAGOGASTRODUODENOSCOPY      FOOT SURGERY Left     Plantar fasciotomy    HYSTERECTOMY      laporoscopic, partial    KNEE ARTHROSCOPY Bilateral     OOPHORECTOMY Left 10/2015    NV ANAL SPHINCTEROTOMY N/A 8/31/2018    Procedure: EUA, LEFT PARTIAL INTERNAL SPHINCTEROTOMY;  Surgeon: Kaila Zelaya MD;  Location: [address] [place] [address] MAIN OR;  Service: Colorectal    NV REPAIR UMBILICAL JPXL,1+M/E,VDHAK N/A 4/24/2019    Procedure: REPAIR HERNIA UMBILICAL LAPAROSCOPIC W/ ROBOTICS;  Surgeon: Nikia Valdez MD;  [facility];  Service: General    REDUCTION MAMMAPLASTY Bilateral 1999    REPAIR LACERATION Left     left hand-5/18/2009    TONSILECTOMY AND ADNOIDECTOMY      TONSILLECTOMY      VEIN LIGATION Bilateral     WISDOM TOOTH EXTRACTION       Current Outpatient Medications   Medication Sig Dispense Refill    albuterol (ProAir HFA) 90 mcg/act inhaler Inhale 2 puffs every 4 (four) hours as needed for wheezing 8 5 g 1    amLODIPine (NORVASC) 5 mg tablet Take 1 tablet (5 mg total) by mouth daily 30 tablet 5    buPROPion (WELLBUTRIN XL) 300 mg 24 hr tablet       carboxymethylcellulose 0 5 % SOLN Administer 1 drop to both eyes 3 (three) times a day as needed for dry eyes 70 mL 1    colestipol (COLESTID) 1 g tablet Take 1 tablet (1 g total) by mouth 2 (two) times a day 60 tablet 0    dicyclomine (BENTYL) 10 mg capsule TAKE 1 CAPSULE BY MOUTH EVERY 6 HOURS AS NEEDED FOR ABDOMINAL PAIN      divalproex sodium (DEPAKOTE) 500 mg EC tablet       Duexis 800-26 6 MG TABS       estradiol (ESTRACE) 1 mg tablet Take 1 tablet (1 mg total) by mouth daily 90 tablet 3    furosemide (LASIX) 20 mg tablet TAKE 1 TABLET (20 MG TOTAL) BY MOUTH DAILY 30 tablet 1    hydrocortisone 2 5 % ointment Apply topically 2 (two) times a day (Patient not taking: Reported on 12/18/2020) 30 g 0    hydrOXYzine HCL (ATARAX) 10 mg tablet Take 10 mg by mouth every 6 (six) hours as needed for itching      levothyroxine 50 mcg tablet Take 1 tablet (50 mcg total) by mouth daily in the early morning 30 tablet 5    loratadine (CLARITIN) 10 mg tablet Take 1 tablet (10 mg total) by mouth daily 30 tablet 5    losartan (COZAAR) 50 mg tablet Take 50 mg by mouth daily       lurasidone (LATUDA) 80 mg tablet Take 60 mg by mouth daily with dinner       meclizine (ANTIVERT) 25 mg tablet Take 1 tablet (25 mg total) by mouth 3 (three) times a day as needed for dizziness (Patient not taking: Reported on 12/18/2020) 21 tablet 0    metoprolol tartrate (LOPRESSOR) 25 mg tablet Take 1 tablet (25 mg total) by mouth 2 (two) times a day 60 tablet 5    neomycin-polymyxin-dexamethasone (MAXITROL) 0 35%-10,000 units/g-0 1%       pantoprazole (PROTONIX) 40 mg tablet Take 40 mg by mouth 2 (two) times a day      pilocarpine (SALAGEN) 5 mg tablet Take 1 tablet (5 mg total) by mouth 2 (two) times a day 60 tablet 5    RABEprazole (ACIPHEX) 20 MG tablet rabeprazole 20 mg tablet,delayed release      topiramate (TOPAMAX) 100 mg tablet Take 100 mg by mouth 2 (two) times a day      TOVIAZ 4 MG TB24 Take 1 tablet (4 mg total) by mouth daily  2    traZODone (DESYREL) 150 mg tablet        No current facility-administered medications for this visit  Allergies   Allergen Reactions    Percocet [Oxycodone-Acetaminophen] Headache     Severe headaches       Review of Systems   Constitutional: Positive for chills and fatigue  Negative for fever  HENT: Positive for congestion and rhinorrhea  Negative for sore throat  Respiratory: Positive for cough, chest tightness, shortness of breath and wheezing  Gastrointestinal: Positive for diarrhea and nausea  Negative for vomiting  Musculoskeletal: Positive for myalgias  Neurological: Positive for headaches  Objective: At on telephone call she had no dyspnea or cough  No conversational shortness of breath  She was alert and oriented and says she is feeling a little bit better today  She is a candidate for monoclonal antibody infusion  There were no vitals filed for this visit  VIRTUAL VISIT DISCLAIMER    Enrico Choudhury acknowledges that she has consented to an online visit or consultation  She understands that the online visit is based solely on information provided by her, and that, in the absence of a face-to-face physical evaluation by the physician, the diagnosis she receives is both limited and provisional in terms of accuracy and completeness  This is not intended to replace a full medical face-to-face evaluation by the physician  Enrico Choudhury understands and accepts these terms

## 2021-01-05 ENCOUNTER — TELEMEDICINE (OUTPATIENT)
Dept: FAMILY MEDICINE CLINIC | Facility: CLINIC | Age: 51
End: 2021-01-05
Payer: MEDICARE

## 2021-01-05 DIAGNOSIS — U07.1 COVID-19 VIRUS INFECTION: Primary | ICD-10-CM

## 2021-01-05 PROCEDURE — 99212 OFFICE O/P EST SF 10 MIN: CPT | Performed by: FAMILY MEDICINE

## 2021-01-05 NOTE — ASSESSMENT & PLAN NOTE
Symptoms started on 12/30/2020 and then got worse  Today she is feeling a little bit better    She is a candidate for monoclonal antibody infusion and set for 1/6/21 @ 1pm at [facility]

## 2021-01-05 NOTE — PROGRESS NOTES
COVID-19 Virtual Visit     Assessment/Plan:    Problem List Items Addressed This Visit        Other    COVID-19 virus infection - Primary     Symptoms started on 12/30/2020 and then got worse  Today she is feeling a little bit better  She is a candidate for monoclonal antibody infusion and set for 1/6/21 @ 1pm at [facility]               Disposition:     Patient is a candidate for [address]  They were counseled in regards to risks, benefits, and side effects of this infusion  Possible side effects of bamlanivimab are: Allergic reactions   Allergic reactions can happen during and after infusion with bamlanivimab which include:    Fever, chills, nausea, headache, shortness of breath, low blood pressure, wheezing, swelling of your lips, face, or throat, rash including hives, itching, muscle aches, and dizziness  The side effects of getting any medicine by vein may include brief pain, bleeding, bruising of the skin, soreness, swelling, and possible infection at the infusion site  These are not all the possible side effects of bamlanivimab  Not a lot of people have been given bamlanivimab  Serious and unexpected side effects may happen  [address] is still being studied so it is possible that all of the risks are not known at this time  Please note that this drug was approved under the Emergency Use Authorization of the FDA and has not gone through the full, formal FDA approval process    It is possible that bamlanivimab could interfere with your body's own ability to fight off a future infection of SARS-CoV-2  Similarly, bamlanivimab may reduce your bodys immune response to a vaccine for SARS-CoV-2  Specific studies have not been conducted to address these possible risks  Currently there is no data or safety or efficacy of COVID-19 vaccination in persons who received monoclonal antibodies as part of COVID-19 treatment   Treatment should be deferred for at least 90 days to avoid interference of the treatment with vaccine-induced immune responses (this is based on estimated half-life of therapies and evidence suggesting reinfection is uncommon within 90 days of initial infection)  The patient consents to proceed with bamlanivimab infusion  *http://pi  mckenzie  com/eua/bamlanivimab-eua-factsheet-hcp  pdf    I have spent 15 minutes directly with the patient  Greater than 50% of this time was spent in counseling/coordination of care regarding: risks and benefits of treatment options and instructions for management  IV infusion of Parthenia Gold if a med which is the monoclonal antibody is scheduled for [facility] tomorrow at 13:00-that is 1/6/21  Wash your hands frequently and cover your mouth when you sneeze or cough  Wear a mask and try to isolate yourself from others in your household, in a 'sick room"  Clean & disinfect touched surfaces, daily  Do not share dishes, drinking glasses, cups, eating utensils,towels or bedding with other people in your home  Try to use a separate bathroom  If you must interact with family members, everyone should wear masks and you need to avoid close contact  Use acetaminophen 650mg orally, every 6 hours for fever, headache or muscle pains  Drink fluids to stay well hydrated  Monitor your temperature if you have a thermometer  If you develop mild shortness of breath or chest pressure, proning can be helpful  If shortness of breath or chest pressure worsens, call your doctor or call 911 and get to an Emergency Room  If you are not severe, you still need to stay in touch with your doctor  Overall, quarantine is recommended for 14 days, after the development of symptoms  If you have a positive test for COVID-19, recommendation is for continued quarantine and isolation for 10 days since symptoms began AND improvement in respiratory symptoms AND resolution of fever for at least 24 hours without the use of fever reducing medications          Encounter provider Corwin Wall, MD    Provider located at [facility]  [address] [place] [id number]    Recent Visits  Date Type Provider Dept   01/04/21 Telemedicine Shamika Parks MD [facility]   01/02/21 Telemedicine Shamika Parks  [facility] recent visits within past 7 days and meeting all other requirements     Today's Visits  Date Type Provider Dept   01/05/21 Telemedicine Shamika Parks  [facility] today's visits and meeting all other requirements     Future Appointments  No visits were found meeting these conditions  Showing future appointments within next 150 days and meeting all other requirements      This virtual check-in was done via [employer] and patient was informed that this is a secure, HIPAA-compliant platform  She agrees to proceed  Patient agrees to participate in a virtual check in via telephone or video visit instead of presenting to the office to address urgent/immediate medical needs  Patient is aware this is a billable service  After connecting through [facility], the patient was identified by name and date of birth  Rima Lynn was informed that this was a telemedicine visit and that the exam was being conducted confidentially over secure lines  My office door was closed  No one else was in the room  Rima Lynn acknowledged consent and understanding of privacy and security of the telemedicine visit  I informed the patient that I have reviewed her record in Epic and presented the opportunity for her to ask any questions regarding the visit today  The patient agreed to participate  Subjective:   Rima Lynn is a 53 y o  female who has been screened for COVID-19  Symptom change since last report: improving       Date of symptom onset: 12/30/2020    Patient's symptoms include fatigue, nasal congestion, anosmia, loss of taste, cough, shortness of breath, chest tightness, nausea, myalgias and headache  Patient denies fever, chills, sore throat, abdominal pain, vomiting and diarrhea  Jodie Arroyo has been staying home and has isolated themselves in her home  She is taking care to not share personal items and is cleaning all surfaces that are touched often, like counters, tabletops, and doorknobs using household cleaning sprays or wipes  She is wearing a mask when she leaves her room  Went to the emergency room last [facility] at [facility] because of increased chest tightness and shortness of breath  She was checked out and she was not hypoxemic and they discharged her to home  She feels a little bit better today    She has been quarantine at home and I have her scheduled for monoclonal antibody infusion tomorrow at [facility]     Lab Results   Component Value Date    SARSCOV2 Positive (A) 01/04/2021     Past Medical History:   Diagnosis Date    Anxiety     Arthritis     oa cassandra knees    Asthma     good control- no medications    Yan's esophagus     Bipolar affective disorder ([address] )     Breathing difficulty     After vascular surgery was diagnosed with sleep apnea    Chronic pain disorder     lumbar    CPAP (continuous positive airway pressure) dependence     Degenerative disc disease at L5-S1 level     Deliberate self-cutting     Depression 09/16/2008    Disease of thyroid gland     hypo    MARTINEZ (dyspnea on exertion)     Drug overdose 10/28/2008    suicide attempt    Dysphagia     Dyspnea     Edema     BLE    GERD (gastroesophageal reflux disease)     High blood pressure     Knee pain, bilateral     Especially right    Migraines     Obese     Overactive bladder     Sjogren's disease ([address] )     Sleep apnea     Stress incontinence     Suicidal ideations     Umbilical hernia     surgical repair today 4/24/2019    Use of cane as ambulatory aid     awaiting b/l knee replacement    Wears glasses      Past Surgical History: Procedure Laterality Date    BREAST BIOPSY Right 1989    benign    CARPAL TUNNEL RELEASE Left     CHOLECYSTECTOMY  05/2003    Laparoscopic    COLONOSCOPY      01/12/2009    DILATION AND CURETTAGE OF UTERUS      ELBOW SURGERY Left     x2   No hardware    ESOPHAGOGASTRODUODENOSCOPY      FOOT SURGERY Left     Plantar fasciotomy    HYSTERECTOMY      laporoscopic, partial    KNEE ARTHROSCOPY Bilateral     OOPHORECTOMY Left 10/2015    WI ANAL SPHINCTEROTOMY N/A 8/31/2018    Procedure: EUA, LEFT PARTIAL INTERNAL SPHINCTEROTOMY;  Surgeon: Qasim Kelly MD;  Location: [address] [place] [address] MAIN OR;  Service: Colorectal    WI REPAIR UMBILICAL ZRWP,6+J/P,ZQLZP N/A 4/24/2019    Procedure: REPAIR HERNIA UMBILICAL LAPAROSCOPIC W/ ROBOTICS;  Surgeon: Emmett Laguna MD;  Location: [facility] Main [facility];  Service: General    REDUCTION MAMMAPLASTY Bilateral 1999    REPAIR LACERATION Left     left hand-5/18/2009    TONSILECTOMY AND ADNOIDECTOMY      TONSILLECTOMY      VEIN LIGATION Bilateral     WISDOM TOOTH EXTRACTION       Current Outpatient Medications   Medication Sig Dispense Refill    albuterol (ProAir HFA) 90 mcg/act inhaler Inhale 2 puffs every 4 (four) hours as needed for wheezing 8 5 g 1    amLODIPine (NORVASC) 5 mg tablet Take 1 tablet (5 mg total) by mouth daily 30 tablet 5    buPROPion (WELLBUTRIN XL) 300 mg 24 hr tablet       carboxymethylcellulose 0 5 % SOLN Administer 1 drop to both eyes 3 (three) times a day as needed for dry eyes 70 mL 1    colestipol (COLESTID) 1 g tablet Take 1 tablet (1 g total) by mouth 2 (two) times a day 60 tablet 0    dicyclomine (BENTYL) 10 mg capsule TAKE 1 CAPSULE BY MOUTH EVERY 6 HOURS AS NEEDED FOR ABDOMINAL PAIN      divalproex sodium (DEPAKOTE) 500 mg EC tablet       Duexis 800-26 6 MG TABS       estradiol (ESTRACE) 1 mg tablet Take 1 tablet (1 mg total) by mouth daily 90 tablet 3    furosemide (LASIX) 20 mg tablet TAKE 1 TABLET (20 MG TOTAL) BY MOUTH DAILY 30 tablet 1    hydrocortisone 2 5 % ointment Apply topically 2 (two) times a day (Patient not taking: Reported on 12/18/2020) 30 g 0    hydrOXYzine HCL (ATARAX) 10 mg tablet Take 10 mg by mouth every 6 (six) hours as needed for itching      levothyroxine 50 mcg tablet Take 1 tablet (50 mcg total) by mouth daily in the early morning 30 tablet 5    loratadine (CLARITIN) 10 mg tablet Take 1 tablet (10 mg total) by mouth daily 30 tablet 5    losartan (COZAAR) 50 mg tablet Take 50 mg by mouth daily       lurasidone (LATUDA) 80 mg tablet Take 60 mg by mouth daily with dinner       meclizine (ANTIVERT) 25 mg tablet Take 1 tablet (25 mg total) by mouth 3 (three) times a day as needed for dizziness (Patient not taking: Reported on 12/18/2020) 21 tablet 0    metoprolol tartrate (LOPRESSOR) 25 mg tablet Take 1 tablet (25 mg total) by mouth 2 (two) times a day 60 tablet 5    neomycin-polymyxin-dexamethasone (MAXITROL) 0 35%-10,000 units/g-0 1%       pantoprazole (PROTONIX) 40 mg tablet Take 40 mg by mouth 2 (two) times a day      pilocarpine (SALAGEN) 5 mg tablet Take 1 tablet (5 mg total) by mouth 2 (two) times a day 60 tablet 5    RABEprazole (ACIPHEX) 20 MG tablet rabeprazole 20 mg tablet,delayed release      topiramate (TOPAMAX) 100 mg tablet Take 100 mg by mouth 2 (two) times a day      TOVIAZ 4 MG TB24 Take 1 tablet (4 mg total) by mouth daily  2    traZODone (DESYREL) 150 mg tablet        No current facility-administered medications for this visit  Allergies   Allergen Reactions    Percocet [Oxycodone-Acetaminophen] Headache     Severe headaches       Review of Systems   Constitutional: Positive for activity change, appetite change and fatigue  Negative for chills and fever  HENT: Positive for congestion  Negative for sore throat  Respiratory: Positive for cough, chest tightness and shortness of breath  Negative for wheezing  Gastrointestinal: Positive for nausea  Negative for abdominal pain, diarrhea and vomiting  Musculoskeletal: Positive for myalgias  Neurological: Positive for headaches  Objective: On the telephone, today, she is alert and oriented and in no distress  She has no conversational dyspnea or cough during the call  I  texted her information on going to [facility] for her IV infusion, tomorrow at 13:00  There were no vitals filed for this visit  VIRTUAL VISIT DISCLAIMER    Roseline Louis acknowledges that she has consented to an online visit or consultation  She understands that the online visit is based solely on information provided by her, and that, in the absence of a face-to-face physical evaluation by the physician, the diagnosis she receives is both limited and provisional in terms of accuracy and completeness  This is not intended to replace a full medical face-to-face evaluation by the physician  Roseline Louis understands and accepts these terms

## 2021-01-06 ENCOUNTER — HOSPITAL ENCOUNTER (OUTPATIENT)
Dept: INFUSION CENTER | Facility: HOSPITAL | Age: 51
Discharge: HOME | End: 2021-01-06
Payer: MEDICARE

## 2021-01-06 ENCOUNTER — APPOINTMENT (OUTPATIENT)
Dept: PHYSICAL THERAPY | Facility: CLINIC | Age: 51
End: 2021-01-06
Payer: MEDICARE

## 2021-01-06 VITALS
SYSTOLIC BLOOD PRESSURE: 140 MMHG | RESPIRATION RATE: 18 BRPM | HEART RATE: 69 BPM | TEMPERATURE: 97.4 F | OXYGEN SATURATION: 99 % | DIASTOLIC BLOOD PRESSURE: 80 MMHG

## 2021-01-06 DIAGNOSIS — E66.01 MORBID OBESITY WITH BMI OF 40.0-44.9, ADULT (HCC): ICD-10-CM

## 2021-01-06 DIAGNOSIS — J44.1 CHRONIC OBSTRUCTIVE PULMONARY DISEASE WITH ACUTE EXACERBATION (HCC): Primary | ICD-10-CM

## 2021-01-06 PROCEDURE — M0239 BAMLANIVIMAB-XXXX INFUSION: HCPCS | Performed by: FAMILY MEDICINE

## 2021-01-06 RX ORDER — ALBUTEROL SULFATE 90 UG/1
3 AEROSOL, METERED RESPIRATORY (INHALATION) ONCE AS NEEDED
Status: CANCELLED | OUTPATIENT
Start: 2021-01-06

## 2021-01-06 RX ORDER — SODIUM CHLORIDE 9 MG/ML
20 INJECTION, SOLUTION INTRAVENOUS ONCE
Status: CANCELLED | OUTPATIENT
Start: 2021-01-06

## 2021-01-06 RX ORDER — ACETAMINOPHEN 325 MG/1
650 TABLET ORAL ONCE AS NEEDED
Status: CANCELLED | OUTPATIENT
Start: 2021-01-06

## 2021-01-06 RX ORDER — SODIUM CHLORIDE 9 MG/ML
20 INJECTION, SOLUTION INTRAVENOUS ONCE
Status: COMPLETED | OUTPATIENT
Start: 2021-01-06 | End: 2021-01-06

## 2021-01-06 RX ADMIN — SODIUM CHLORIDE 20 ML/HR: 0.9 INJECTION, SOLUTION INTRAVENOUS at 13:00

## 2021-01-06 RX ADMIN — SODIUM CHLORIDE 700 MG: 9 INJECTION, SOLUTION INTRAVENOUS at 13:31

## 2021-01-06 NOTE — PROGRESS NOTES
Rn  offered patient water and blanket at this time but Pt refused  rn asked patient if there was anything she could do to make her more comfortable or feel alittle better but patient refused  Patient still appears to upset, rn offered to  help patient put her earring back in but patient also refused  Will continue to monitor patient   Vitals wnl

## 2021-01-06 NOTE — PLAN OF CARE
Problem: Potential for Falls  Goal: Patient will remain free of falls  Description: INTERVENTIONS:  - Assess patient frequently for physical needs  -  Identify cognitive and physical deficits and behaviors that affect risk of falls  -  [place] fall precautions as indicated by assessment   - Educate patient/family on patient safety including physical limitations  - Instruct patient to call for assistance with activity based on assessment  - Modify environment to reduce risk of injury  - Consider OT/PT consult to assist with strengthening/mobility  Outcome: Progressing     Problem: Knowledge Deficit  Goal: Patient/family/caregiver demonstrates understanding of disease process, treatment plan, medications, and discharge instructions  Description: Complete learning assessment and assess knowledge base    Interventions:  - Provide teaching at level of understanding  - Provide teaching via preferred learning methods  Outcome: Progressing

## 2021-01-06 NOTE — PROGRESS NOTES
Pt tolerated infusion without adverse reaction  No s/s of distress at this time  Pt monitored for one hour, vitals stable  Pt informed to notify PCP of any delayed adverse reactions  Pt has no questions or concerns at time of discharge  Pt provided discharge instructions  Pt ambulated off unit

## 2021-01-06 NOTE — PROGRESS NOTES
Pt appears more comfortable at this time and no longer looks upset  rn provided pt with a blanket  Vitals wnl  Pt has no complaints or concerns at this  No visible s/s of distress  Will continue to monitor

## 2021-01-06 NOTE — PROGRESS NOTES
Infusion started at this time  Pt educated on s/s of a reaction and when to notify care team  Pt is comfortable, vitals signs wnl  Will continue to monitor

## 2021-01-06 NOTE — PROGRESS NOTES
Pt provided with EUA form, no questions or concerns at this time  Pt provided verbal consent for tx today  Pts vitals stable, Periphal iv inserted, tolerated well  NSS hanging at this time  Pt educated on s/s of reaction  Pharmacy called at this time for medication  Will continue to monitor

## 2021-01-06 NOTE — PROGRESS NOTES
Pt extremely upset because she lost the back of her earring, rn informed pt that once she gets her situated she would help her look for the earring  piece  rn found earring piece and placed it in a cup per patient request  Pt states " I just been having a few horrible days, iv been frustrated" rn offered to listen to patient if she wanted  to discuss what was bothering her or vent but patient refused at this time

## 2021-01-07 ENCOUNTER — TELEMEDICINE (OUTPATIENT)
Dept: FAMILY MEDICINE CLINIC | Facility: CLINIC | Age: 51
End: 2021-01-07
Payer: MEDICARE

## 2021-01-07 DIAGNOSIS — U07.1 COVID-19 VIRUS INFECTION: Primary | ICD-10-CM

## 2021-01-07 PROCEDURE — 99442 PR PHYS/QHP TELEPHONE EVALUATION 11-20 MIN: CPT | Performed by: FAMILY MEDICINE

## 2021-01-07 NOTE — PROGRESS NOTES
COVID-19 Virtual Visit     Assessment/Plan:    Problem List Items Addressed This Visit        Other    COVID-19 virus infection - Primary         Disposition:     I recommended continued isolation until at least 24 hours have passed since recovery defined as resolution of fever without the use of fever-reducing medications AND improvement in COVID symptoms AND 10 days have passed since onset of symptoms (or 10 days have passed since date of first positive viral diagnostic test for asymptomatic patients)  Continue to quarantine for another 2 days  Return to work as of 01/11/2021 and we will fax a letter to her business  She will call if any further problems  I have spent 15 minutes directly with the patient  Greater than 50% of this time was spent in counseling/coordination of care regarding: instructions for management, importance of treatment compliance and impressions  Encounter provider Pietro Ulrich MD    Provider located at [facility] AT [address] [id number]    Recent Visits  Date Type Provider Dept   01/05/21 Telemedicine Pietro Ulrich MD [address] [place] [address]   01/04/21 Telemedicine Pietro Ulrich MD [address] [place] [address]   01/02/21 Telemedicine Pietro Ulrich MD [facility] recent visits within past 7 days and meeting all other requirements     Today's Visits  Date Type Provider Dept   01/07/21 Telemedicine Pietro Ulrich  "[employer] today's visits and meeting all other requirements     Future Appointments  No visits were found meeting these conditions  Showing future appointments within next 150 days and meeting all other requirements        Patient agrees to participate in a virtual check in via telephone or video visit instead of presenting to the office to address urgent/immediate medical needs   Patient is aware this is a billable service  After connecting through Telephone, the patient was identified by name and date of birth  Alana Palma was informed that this was a telemedicine visit and that the exam was being conducted confidentially over secure lines  My office door was closed  No one else was in the room  Alana Palma acknowledged consent and understanding of privacy and security of the telemedicine visit  I informed the patient that I have reviewed her record in Epic and presented the opportunity for her to ask any questions regarding the visit today  The patient agreed to participate  Subjective:   Alana Palma is a 53 y o  female who has been screened for COVID-19  Symptom change since last report: resolving  Patient's symptoms include fatigue, nasal congestion, anosmia, loss of taste, shortness of breath, nausea and headache  Patient denies fever, chills, sore throat, cough, chest tightness, vomiting, diarrhea and myalgias  Margarita Brown has been staying home and has isolated themselves in her home  She is taking care to not share personal items and is cleaning all surfaces that are touched often, like counters, tabletops, and doorknobs using household cleaning sprays or wipes  She is wearing a mask when she leaves her room  Date of symptom onset: 12/30/2020  Date of positive COVID-19 PCR: 1/4/2021    Monoclonal Antibody Follow-up Symptom Questionnaire  I feel overall: much better  My breathing is: much better  My fever is: better  My fatigue is: much better    Patient tells me that she feels 80% better since she had the polyclonal antibody infusion, yet  She has symptoms as noted above but they are minor symptoms at this point  She is thinking that she will be to return to work as of 01/11/2021  In the meantime she needs to come to quarantine      Lab Results   Component Value Date    SARSCOV2 Positive (A) 01/04/2021     Past Medical History:   Diagnosis Date    Anxiety     Arthritis     oa cassandra knees    Asthma     good control- no medications    Yan's esophagus     Bipolar affective disorder (HCC)     Breathing difficulty     After vascular surgery was diagnosed with sleep apnea    Chronic pain disorder     lumbar    CPAP (continuous positive airway pressure) dependence     Degenerative disc disease at L5-S1 level     Deliberate self-cutting     Depression 09/16/2008    Disease of thyroid gland     hypo    MARTINEZ (dyspnea on exertion)     Drug overdose 10/28/2008    suicide attempt    Dysphagia     Dyspnea     Edema     BLE    GERD (gastroesophageal reflux disease)     High blood pressure     Knee pain, bilateral     Especially right    Migraines     Obese     Overactive bladder     Sjogren's disease ([address] )     Sleep apnea     Stress incontinence     Suicidal ideations     Umbilical hernia     surgical repair today 4/24/2019    Use of cane as ambulatory aid     awaiting b/l knee replacement    Wears glasses      Past Surgical History:   Procedure Laterality Date    BREAST BIOPSY Right 1989    benign    CARPAL TUNNEL RELEASE Left     CHOLECYSTECTOMY  05/2003    Laparoscopic    COLONOSCOPY      01/12/2009    DILATION AND CURETTAGE OF UTERUS      ELBOW SURGERY Left     x2   No hardware    ESOPHAGOGASTRODUODENOSCOPY      FOOT SURGERY Left     Plantar fasciotomy    HYSTERECTOMY      laporoscopic, partial    KNEE ARTHROSCOPY Bilateral     OOPHORECTOMY Left 10/2015    CT ANAL SPHINCTEROTOMY N/A 8/31/2018    Procedure: EUA, LEFT PARTIAL INTERNAL SPHINCTEROTOMY;  Surgeon: Panchito Silver MD;  Location: [facility] MAIN OR;  Service: Colorectal    CT REPAIR UMBILICAL VDYT,4+O/P,GWACQ N/A 4/24/2019    Procedure: REPAIR HERNIA UMBILICAL LAPAROSCOPIC W/ ROBOTICS;  Surgeon: Annie Nino MD;  Location: [facility] OR;  Service: General    REDUCTION MAMMAPLASTY Bilateral 1999    REPAIR LACERATION Left     left hand-5/18/2009    TONSILECTOMY AND ADNOIDECTOMY      TONSILLECTOMY      VEIN LIGATION Bilateral     WISDOM TOOTH EXTRACTION       Current Outpatient Medications   Medication Sig Dispense Refill    albuterol (ProAir HFA) 90 mcg/act inhaler Inhale 2 puffs every 4 (four) hours as needed for wheezing 8 5 g 1    amLODIPine (NORVASC) 5 mg tablet Take 1 tablet (5 mg total) by mouth daily 30 tablet 5    buPROPion (WELLBUTRIN XL) 300 mg 24 hr tablet       carboxymethylcellulose 0 5 % SOLN Administer 1 drop to both eyes 3 (three) times a day as needed for dry eyes 70 mL 1    colestipol (COLESTID) 1 g tablet Take 1 tablet (1 g total) by mouth 2 (two) times a day 60 tablet 0    dicyclomine (BENTYL) 10 mg capsule TAKE 1 CAPSULE BY MOUTH EVERY 6 HOURS AS NEEDED FOR ABDOMINAL PAIN      divalproex sodium (DEPAKOTE) 500 mg EC tablet       Duexis 800-26 6 MG TABS       estradiol (ESTRACE) 1 mg tablet Take 1 tablet (1 mg total) by mouth daily 90 tablet 3    furosemide (LASIX) 20 mg tablet TAKE 1 TABLET (20 MG TOTAL) BY MOUTH DAILY 30 tablet 1    hydrocortisone 2 5 % ointment Apply topically 2 (two) times a day (Patient not taking: Reported on 12/18/2020) 30 g 0    hydrOXYzine HCL (ATARAX) 10 mg tablet Take 10 mg by mouth every 6 (six) hours as needed for itching      levothyroxine 50 mcg tablet Take 1 tablet (50 mcg total) by mouth daily in the early morning 30 tablet 5    loratadine (CLARITIN) 10 mg tablet Take 1 tablet (10 mg total) by mouth daily 30 tablet 5    losartan (COZAAR) 50 mg tablet Take 50 mg by mouth daily       lurasidone (LATUDA) 80 mg tablet Take 60 mg by mouth daily with dinner       meclizine (ANTIVERT) 25 mg tablet Take 1 tablet (25 mg total) by mouth 3 (three) times a day as needed for dizziness (Patient not taking: Reported on 12/18/2020) 21 tablet 0    metoprolol tartrate (LOPRESSOR) 25 mg tablet Take 1 tablet (25 mg total) by mouth 2 (two) times a day 60 tablet 5    neomycin-polymyxin-dexamethasone (MAXITROL) 0 35%-10,000 units/g-0 1%       pantoprazole (PROTONIX) 40 mg tablet Take 40 mg by mouth 2 (two) times a day      pilocarpine (SALAGEN) 5 mg tablet Take 1 tablet (5 mg total) by mouth 2 (two) times a day 60 tablet 5    RABEprazole (ACIPHEX) 20 MG tablet rabeprazole 20 mg tablet,delayed release      topiramate (TOPAMAX) 100 mg tablet Take 100 mg by mouth 2 (two) times a day      TOVIAZ 4 MG TB24 Take 1 tablet (4 mg total) by mouth daily  2    traZODone (DESYREL) 150 mg tablet        No current facility-administered medications for this visit  Allergies   Allergen Reactions    Percocet [Oxycodone-Acetaminophen] Headache     Severe headaches       Review of Systems   Constitutional: Positive for activity change, appetite change and fatigue  Negative for chills and fever  HENT: Positive for congestion  Negative for sore throat  Respiratory: Positive for shortness of breath  Negative for cough and chest tightness  Gastrointestinal: Positive for nausea  Negative for diarrhea and vomiting  Musculoskeletal: Negative for myalgias  Neurological: Positive for headaches  Objective: On telephone today, she is alert and oriented feels so much better  She has no cough or conversational dyspnea today  There were no vitals filed for this visit  VIRTUAL VISIT DISCLAIMER    Maddison Cerna acknowledges that she has consented to an online visit or consultation  She understands that the online visit is based solely on information provided by her, and that, in the absence of a face-to-face physical evaluation by the physician, the diagnosis she receives is both limited and provisional in terms of accuracy and completeness  This is not intended to replace a full medical face-to-face evaluation by the physician  Maddison Cerna understands and accepts these terms

## 2021-01-08 ENCOUNTER — APPOINTMENT (OUTPATIENT)
Dept: PHYSICAL THERAPY | Facility: CLINIC | Age: 51
End: 2021-01-08
Payer: MEDICARE

## 2021-01-08 ENCOUNTER — DOCUMENTATION (OUTPATIENT)
Dept: FAMILY MEDICINE CLINIC | Facility: CLINIC | Age: 51
End: 2021-01-08

## 2021-01-08 NOTE — LETTER
January 8, 2021     [place] [id number] [facility]    Patient: Heavenly Eden   YOB: 1970   Date of Visit: 1/8/2021       To Whom It May Concern:      Above mentioned patient Layman American  is  COVID positive and received an intravenous infusion monoclonal antibody on 01/06/2021   She had dramatic improvement and is doing very much better  Jenn Mathur Is completing 10 days of quarantine is cleared to return to work and full duties as of 01/11/2021  If you have any questions or concerns, feel free to contact our office        Sincerely,      Gildardo Gonzalez MD

## 2021-01-11 DIAGNOSIS — Z23 NEED FOR INFLUENZA VACCINATION: Primary | ICD-10-CM

## 2021-01-13 ENCOUNTER — OFFICE VISIT (OUTPATIENT)
Dept: PHYSICAL THERAPY | Facility: CLINIC | Age: 51
End: 2021-01-13
Payer: MEDICARE

## 2021-01-13 ENCOUNTER — OFFICE VISIT (OUTPATIENT)
Dept: FAMILY MEDICINE CLINIC | Facility: CLINIC | Age: 51
End: 2021-01-13
Payer: MEDICARE

## 2021-01-13 ENCOUNTER — OFFICE VISIT (OUTPATIENT)
Dept: OBGYN CLINIC | Facility: CLINIC | Age: 51
End: 2021-01-13
Payer: MEDICARE

## 2021-01-13 VITALS
WEIGHT: 268.7 LBS | RESPIRATION RATE: 18 BRPM | DIASTOLIC BLOOD PRESSURE: 78 MMHG | BODY MASS INDEX: 44.71 KG/M2 | TEMPERATURE: 97.2 F | OXYGEN SATURATION: 99 % | HEART RATE: 75 BPM | SYSTOLIC BLOOD PRESSURE: 130 MMHG

## 2021-01-13 VITALS
WEIGHT: 268 LBS | DIASTOLIC BLOOD PRESSURE: 88 MMHG | HEIGHT: 67 IN | SYSTOLIC BLOOD PRESSURE: 128 MMHG | TEMPERATURE: 96.8 F | BODY MASS INDEX: 42.06 KG/M2

## 2021-01-13 DIAGNOSIS — I10 HYPERTENSION, UNSPECIFIED TYPE: Primary | ICD-10-CM

## 2021-01-13 DIAGNOSIS — R10.30 LOWER ABDOMINAL PAIN: Primary | ICD-10-CM

## 2021-01-13 DIAGNOSIS — E66.01 CLASS 3 SEVERE OBESITY DUE TO EXCESS CALORIES WITH SERIOUS COMORBIDITY AND BODY MASS INDEX (BMI) OF 40.0 TO 44.9 IN ADULT (HCC): ICD-10-CM

## 2021-01-13 DIAGNOSIS — M72.2 PLANTAR FASCIITIS, LEFT: Primary | ICD-10-CM

## 2021-01-13 PROCEDURE — 99203 OFFICE O/P NEW LOW 30 MIN: CPT | Performed by: OBSTETRICS & GYNECOLOGY

## 2021-01-13 PROCEDURE — 97110 THERAPEUTIC EXERCISES: CPT

## 2021-01-13 PROCEDURE — 99213 OFFICE O/P EST LOW 20 MIN: CPT | Performed by: NURSE PRACTITIONER

## 2021-01-13 PROCEDURE — 87086 URINE CULTURE/COLONY COUNT: CPT | Performed by: OBSTETRICS & GYNECOLOGY

## 2021-01-13 PROCEDURE — 97140 MANUAL THERAPY 1/> REGIONS: CPT

## 2021-01-13 NOTE — PROGRESS NOTES
Assessment/Plan:         Diagnoses and all orders for this visit:    Hypertension, unspecified type    Class 3 severe obesity due to excess calories with serious comorbidity and body mass index (BMI) of 40 0 to 44 9 in adult [facility])          Subjective:      Patient ID: Debby Martin is a 53 y o  female  HPI  Here for the hypertension check  Recently had transfusion for covid   Did improve later  Doing well     Still issues where she lives  The following portions of the patient's history were reviewed and updated as appropriate: allergies, current medications, past family history, past medical history, past social history, past surgical history and problem list     Review of Systems   Constitutional: Negative for activity change, appetite change, chills, fatigue and fever  HENT: Negative for congestion, rhinorrhea and sore throat  Respiratory: Negative for cough, chest tightness and shortness of breath  Cardiovascular: Negative for chest pain  Gastrointestinal: Negative for abdominal pain, constipation and diarrhea  Genitourinary: Negative for frequency and urgency  Musculoskeletal: Positive for arthralgias  Skin: Negative for rash  Neurological: Negative for light-headedness and headaches  Psychiatric/Behavioral: The patient is not hyperactive  Objective:  Vitals:    01/13/21 0729   BP: 130/78   BP Location: Left arm   Patient Position: Sitting   Cuff Size: Large   Pulse: 75   Resp: 18   Temp: (!) 97 2 °F (36 2 °C)   TempSrc: Temporal   SpO2: 99%   Weight: 122 kg (268 lb 11 2 oz)      Physical Exam  Vitals signs reviewed  Constitutional:       Appearance: Normal appearance  She is obese  HENT:      Right Ear: Tympanic membrane, ear canal and external ear normal       Left Ear: Tympanic membrane, ear canal and external ear normal    Eyes:      Conjunctiva/sclera: Conjunctivae normal    Neck:      Musculoskeletal: Normal range of motion and neck supple     Cardiovascular: Rate and Rhythm: Normal rate and regular rhythm  Heart sounds: Normal heart sounds  Pulmonary:      Effort: Pulmonary effort is normal       Breath sounds: Normal breath sounds  Abdominal:      General: Bowel sounds are normal       Palpations: Abdomen is soft  Musculoskeletal:        Feet:    Skin:     General: Skin is warm  Neurological:      Mental Status: She is alert and oriented to person, place, and time

## 2021-01-13 NOTE — PROGRESS NOTES
Assessment/Plan:     Diagnoses and all orders for this visit:    Lower abdominal pain  -     US pelvis transabdominal only; Future  -     Urine culture      21-year-old female  Right lower quadrant pain  Had supracervical hysterectomy with LSO  Mild dysuria  History of ovarian cyst  Plan  Urine culture  Pelvic ultrasound check for ovarian cyst  If both negative recommend GI evaluation possible GI etiology of her pain    Subjective:      Patient ID: Orie Meigs is a 53 y o  female  Pelvic Pain  The patient's primary symptoms include pelvic pain  This is a recurrent problem  The current episode started more than 1 month ago  The problem occurs intermittently  The problem has been waxing and waning  The pain is mild  The problem affects the right side  She is not pregnant  Associated symptoms include abdominal pain, diarrhea, dysuria and urgency  Pertinent negatives include no anorexia, back pain, chills, fever, flank pain, frequency, headaches, hematuria, nausea or rash  Nothing aggravates the symptoms  She has tried nothing for the symptoms  She is not sexually active  Her past medical history is significant for a gynecological surgery  The following portions of the patient's history were reviewed and updated as appropriate: allergies, current medications, past family history, past medical history, past social history, past surgical history and problem list     Review of Systems   Constitutional: Negative for chills and fever  Gastrointestinal: Positive for abdominal pain and diarrhea  Negative for anorexia and nausea  Genitourinary: Positive for dysuria, pelvic pain and urgency  Negative for flank pain, frequency and hematuria  Musculoskeletal: Negative for back pain  Skin: Negative for rash  Neurological: Negative for headaches           Objective:      /88 (BP Location: Left arm, Patient Position: Sitting, Cuff Size: Adult)   Temp (!) 96 8 °F (36 °C) (Temporal)   Ht 5' 7" (1 702 m) Wt 122 kg (268 lb)   BMI 41 97 kg/m²          Physical Exam  Constitutional:       Appearance: She is well-developed  Abdominal:      General: There is no distension  Palpations: Abdomen is soft  Tenderness: There is no abdominal tenderness  Genitourinary:     Labia:         Right: No rash, tenderness or lesion  Left: No rash, tenderness or lesion  Vagina: No signs of injury  No vaginal discharge, erythema or tenderness  Cervix: No discharge or friability  Uterus: Absent  Adnexa:         Right: Tenderness present  No mass or fullness  Left: No mass, tenderness or fullness  Comments: Mild tenderness noted on the right lower quadrant and suprapubic area no other abnormality noted  Neurological:      Mental Status: She is alert and oriented to person, place, and time     Psychiatric:         Behavior: Behavior normal

## 2021-01-13 NOTE — PROGRESS NOTES
Daily Note     Today's date: 2021  Patient name: Alana Palma  : 1970  MRN: [id number]  Referring provider: Phoebe Langley DPM  Dx:   Encounter Diagnosis     ICD-10-CM    1  Plantar fasciitis, left  M72 2                   Subjective: Pt states she has an MRI scheduled for tomorrow; reports MD would like to schedule surgery soon  Objective: See treatment diary below      Assessment: Tolerated treatment well  Patient would benefit from continued PT  No adverse effects noted w/ Laser tx  TTP medial calcaneal tuberosity and medial heel  Pt challenged by SLS; attempt foam nv  Plan: Progress treatment as tolerated         Precautions: Depression, anxiety      Manuals      laser NV 5' 5' n/a 5' 5' 5' 5'     graston NV 5' 5' LH 6' 6' 6' 4'     Manual STM NV 5' 5' LH 4' 4' 4' 4'     Manual stretching  NV 2' 2' LH 3' 3' 3' 2'     Neuro Re-Ed             SLS NV 10"x5 10"x5 5x10" 10" x5 10" x5 10" x5  10"x5      steamboats        NV     Heel raises (3 up, 3 hold, 3 down) NV NV NV? 10x 2x10  2x10  2x10 NV                                                         Ther Ex             Bike  NV 5' 5' 5' 5' lvl 5  5 lvl 5 5' lvl 5  NV                               Towel calf stretch  20" x3 SB 20"x3 SB 20"x3 SB 3x20" 20" x3 20" x3  20" x3 20"x3     Soleus S NV SB SB 20"x3 SB 3x20" 20" x3  20" x3  20" x3  20"x3     Towel plantar fascia stretch  20" x3  20"x3 20"x3 3x20"         Towel curls  NV 2' 2' 2' 2' 2' 2' 2'                                            Ther Activity                                                                              Modalities             HP PRN

## 2021-01-14 ENCOUNTER — HOSPITAL ENCOUNTER (OUTPATIENT)
Dept: RADIOLOGY | Age: 51
Discharge: HOME | End: 2021-01-14
Payer: MEDICARE

## 2021-01-14 ENCOUNTER — TELEPHONE (OUTPATIENT)
Dept: FAMILY MEDICINE CLINIC | Facility: CLINIC | Age: 51
End: 2021-01-14

## 2021-01-14 DIAGNOSIS — M72.2 PLANTAR FASCIAL FIBROMATOSIS: ICD-10-CM

## 2021-01-14 DIAGNOSIS — I10 HYPERTENSION, UNSPECIFIED TYPE: ICD-10-CM

## 2021-01-14 LAB — BACTERIA UR CULT: NORMAL

## 2021-01-14 PROCEDURE — G1004 CDSM NDSC: HCPCS

## 2021-01-14 PROCEDURE — 73721 MRI JNT OF LWR EXTRE W/O DYE: CPT

## 2021-01-14 RX ORDER — FUROSEMIDE 20 MG/1
TABLET ORAL
Qty: 30 TABLET | Refills: 3 | Status: SHIPPED | OUTPATIENT
Start: 2021-01-14 | End: 2021-05-30

## 2021-01-14 NOTE — PROGRESS NOTES
Daily Note     Today's date: 1/15/2021  Patient name: Melissa Cisneros  : 1970  MRN: [id number]  Referring provider: Marybel Costa DPM  Dx:   Encounter Diagnosis     ICD-10-CM    1  Plantar fasciitis, left  M72 2                   Subjective: Pt states her foot felt "a lot better" following last session  Objective: See treatment diary below      Assessment: Tolerated treatment well  Patient would benefit from continued PT  Increased L heel pain noted w/ addition of steamboats  Continues to be challenged by SLS  TTP L medial calcaneal tube and medial aspect of heel persists  Plan: Progress treatment as tolerated         Precautions: Depression, anxiety      Manuals 12/7 12/11 12/14 12/16 12/23 12/28 12/30 1/13 1/15    laser NV 5' 5' n/a 5' 5' 5' 5' 5'    graston NV 5' 5' LH 6' 6' 6' 4' 4'    Manual STM NV 5' 5' LH 4' 4' 4' 4' 4'    Manual stretching  NV 2' 2' LH 3' 3' 3' 2' 2'    Neuro Re-Ed             SLS NV 10"x5 10"x5 5x10" 10" x5 10" x5 10" x5  10"x5  10"x5    steamboats        NV YTB 10 ea L WB    Heel raises (3 up, 3 hold, 3 down) NV NV NV? 10x 2x10  2x10  2x10 NV x10                                                        Ther Ex             Bike  NV 5' 5' 5' 5' lvl 5  5 lvl 5 5' lvl 5  NV 5' lv 5                              Towel calf stretch  20" x3 SB 20"x3 SB 20"x3 SB 3x20" 20" x3 20" x3  20" x3 20"x3 20"x3    Soleus S NV SB SB 20"x3 SB 3x20" 20" x3  20" x3  20" x3  20"x3 20"x3    Towel plantar fascia stretch  20" x3  20"x3 20"x3 3x20"         Towel curls  NV 2' 2' 2' 2' 2' 2' 2' 2'                                           Ther Activity                                                                              Modalities             HP PRN

## 2021-01-15 ENCOUNTER — OFFICE VISIT (OUTPATIENT)
Dept: PHYSICAL THERAPY | Facility: CLINIC | Age: 51
End: 2021-01-15
Payer: MEDICARE

## 2021-01-15 DIAGNOSIS — M72.2 PLANTAR FASCIITIS, LEFT: Primary | ICD-10-CM

## 2021-01-15 PROCEDURE — 97112 NEUROMUSCULAR REEDUCATION: CPT

## 2021-01-15 PROCEDURE — 97140 MANUAL THERAPY 1/> REGIONS: CPT

## 2021-01-15 PROCEDURE — 97110 THERAPEUTIC EXERCISES: CPT

## 2021-01-18 ENCOUNTER — APPOINTMENT (OUTPATIENT)
Dept: PHYSICAL THERAPY | Facility: CLINIC | Age: 51
End: 2021-01-18
Payer: MEDICARE

## 2021-01-18 ENCOUNTER — HOSPITAL ENCOUNTER (OUTPATIENT)
Dept: RADIOLOGY | Age: 51
Discharge: HOME | End: 2021-01-18
Payer: MEDICARE

## 2021-01-18 DIAGNOSIS — R10.30 LOWER ABDOMINAL PAIN: ICD-10-CM

## 2021-01-18 PROCEDURE — 76856 US EXAM PELVIC COMPLETE: CPT

## 2021-01-18 PROCEDURE — 76830 TRANSVAGINAL US NON-OB: CPT

## 2021-01-20 ENCOUNTER — TRANSCRIBE ORDERS (OUTPATIENT)
Dept: ADMINISTRATIVE | Facility: HOSPITAL | Age: 51
End: 2021-01-20

## 2021-01-20 ENCOUNTER — APPOINTMENT (OUTPATIENT)
Dept: PHYSICAL THERAPY | Facility: CLINIC | Age: 51
End: 2021-01-20
Payer: MEDICARE

## 2021-01-20 DIAGNOSIS — M77.02 MEDIAL EPICONDYLITIS OF LEFT ELBOW: Primary | ICD-10-CM

## 2021-01-20 DIAGNOSIS — G56.22 LESION OF LEFT ULNAR NERVE: ICD-10-CM

## 2021-01-25 ENCOUNTER — EVALUATION (OUTPATIENT)
Dept: PHYSICAL THERAPY | Facility: CLINIC | Age: 51
End: 2021-01-25
Payer: MEDICARE

## 2021-01-25 DIAGNOSIS — M77.02 MEDIAL EPICONDYLITIS, LEFT ELBOW: Primary | ICD-10-CM

## 2021-01-25 PROCEDURE — 97161 PT EVAL LOW COMPLEX 20 MIN: CPT | Performed by: PHYSICAL THERAPIST

## 2021-01-25 NOTE — PROGRESS NOTES
PT Evaluation     Today's date: 2021  Patient name: Yokasta Cisneros  : 1970  MRN: [id number]  Referring provider: Nasreen Roach MD  Dx:   Encounter Diagnosis     ICD-10-CM    1  Medial epicondylitis, left elbow  M77 02                   Assessment  Assessment details: Yokasta Cisneros is a 53 y o  female who presents with signs and symptoms of medial epicondylitis  She had past surgical corrective procedures to medial epicondyle that have failed  She has mild decrease in ROM, decreased strength, decreased activity tolerance, poor posture, and decreased function  Patient would benefit from skilled physical therapy in order to address said deficits and improve functional mobility   Impairments: abnormal or restricted ROM, abnormal movement, activity intolerance, impaired physical strength, lacks appropriate home exercise program, pain with function, weight-bearing intolerance, poor posture  and poor body mechanics  Understanding of Dx/Px/POC: goodPrognosis details: 2 prior failed surgeries    Goals  STG:  Decrease pain 1 grade   Demonstrate proper ergonomic posture   Independent with HEP    LTG:  Decrease pain 2 grades  Improve strength at least 1/2 grade   Lift 15# without difficulty   Push/pull 15# without difficulty     Plan  Patient would benefit from: skilled physical therapy  Planned modality interventions: cryotherapy, low level laser therapy and thermotherapy: hydrocollator packs  Planned therapy interventions: abdominal trunk stabilization, manual therapy, massage, joint mobilization, patient education, self care, stretching, strengthening, therapeutic activities, therapeutic exercise, flexibility, neuromuscular re-education and home exercise program  Frequency: 2x week  Duration in weeks: 6  Treatment plan discussed with: patient        Subjective Evaluation    History of Present Illness  Mechanism of injury: Pt with history of 2 surgeries on left medial elbow (most recent 2-3 years ago)   She notes elbow pain resumed about 1-2 months ago at work due to pulling and lifting  She Is getting n/t into last 3 digits on left hand, has difficulty planing guitar due to tingling  Most pain with lifting, pulling ( she has a 15# restriction at this time)     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 and note sthat could have been causing more pain  She is right hand dominant  She is getting MRI on  and supposed to follow up with MD on  scheduled for surgery for her plantar fasciitis and notes her left hip is getting sore too  Pain  Current pain ratin  At worst pain ratin    Patient Goals  Patient goals for therapy: decreased pain  Patient goal: have no dificulty grabbing or lifting         Objective     Tenderness     Left Elbow   Tenderness in the cubital tunnel and medial epicondyle  Left Wrist/Hand   Tenderness in the medial epicondyle       Active Range of Motion     Left Elbow   Flexion: WFL  Extension: WFL  Forearm supination: 86 degrees   Forearm pronation: 118 degrees     Right Elbow   Normal active range of motion  Flexion: WFL  Extension: WFL  Forearm supination: 98 degrees   Forearm pronation: 88 degrees     Strength/Myotome Testing     Left Elbow   Flexion: 4+ (pain )  Extension: 4+  Forearm pronation: 4+ (pain)    Right Elbow   Flexion: 5  Extension: 5  Forearm supination: 5  Forearm pronation: 5    Additional Strength Details  Wrist flexion/extn 5/5 bilaterally     Right  neutral 76#  Left  neutral 81#       Flowsheet Rows      Most Recent Value   PT/OT G-Codes   Current Score  55   Projected Score  59             Precautions: none       Manuals             Graston to  NV            Laser  6'                                      Neuro Re-Ed                                                                                                        Ther Ex             UBE NV            Wrist flexion stretch 10" x10            Wrist extension strethc 10'x10            DB wrist flexion NV            DB wrist extension NV            DB sup/prone NV            DB radial dev NV            DB ulnar dev NV                         Ther Activity             Putty squeezes             Putty pinches             digi flexion  NV           therabar bending              therabar wringing    NV                      Modalities             HP nv

## 2021-01-25 NOTE — LETTER
2021    MD LUIS De Leon/Gilmar Hughes    Patient: Bhupendra Soto   YOB: 1970   Date of Visit: 2021     Encounter Diagnosis     ICD-10-CM    1  Medial epicondylitis, left elbow  M77 02        Dear Dr Hoffman Forget: Thank you for your recent referral of Bhupendra Soto  Please review the attached evaluation summary from Blanca's recent visit  Please verify that you agree with the plan of care by signing the attached order  If you have any questions or concerns, please do not hesitate to call  I sincerely appreciate the opportunity to share in the care of one of your patients and hope to have another opportunity to work with you in the near future  Sincerely,    Raman Carroll, PT      Referring Provider:      I certify that I have read the below Plan of Care and certify the need for these services furnished under this plan of treatment while under my care  Lindsay Fuchs MD  [facility] [phone] [id number]  Via Fax: [phone]          PT Evaluation     Today's date: 2021  Patient name: Bhupendra Soto  : 1970  MRN: [id number]  Referring provider: Mary Stone MD  Dx:   Encounter Diagnosis     ICD-10-CM    1  Medial epicondylitis, left elbow  M77 02                   Assessment  Assessment details: Bhupendra Soto is a 53 y o  female who presents with signs and symptoms of medial epicondylitis  She had past surgical corrective procedures to medial epicondyle that have failed  She has mild decrease in ROM, decreased strength, decreased activity tolerance, poor posture, and decreased function   Patient would benefit from skilled physical therapy in order to address said deficits and improve functional mobility   Impairments: abnormal or restricted ROM, abnormal movement, activity intolerance, impaired physical strength, lacks appropriate home exercise program, pain with function, weight-bearing intolerance, poor posture  and poor body mechanics  Understanding of Dx/Px/POC: goodPrognosis details: 2 prior failed surgeries    Goals  STG:  Decrease pain 1 grade   Demonstrate proper ergonomic posture   Independent with HEP    LTG:  Decrease pain 2 grades  Improve strength at least 1/2 grade   Lift 15# without difficulty   Push/pull 15# without difficulty     Plan  Patient would benefit from: skilled physical therapy  Planned modality interventions: cryotherapy, low level laser therapy and thermotherapy: hydrocollator packs  Planned therapy interventions: abdominal trunk stabilization, manual therapy, massage, joint mobilization, patient education, self care, stretching, strengthening, therapeutic activities, therapeutic exercise, flexibility, neuromuscular re-education and home exercise program  Frequency: 2x week  Duration in weeks: 6  Treatment plan discussed with: patient        Subjective Evaluation    History of Present Illness  Mechanism of injury: Pt with history of 2 surgeries on left medial elbow (most recent 2-3 years ago)  She notes elbow pain resumed about 1-2 months ago at work due to pulling and lifting  She Is getting n/t into last 3 digits on left hand, has difficulty planing guitar due to tingling  Most pain with lifting, pulling ( she has a 15# restriction at this time)     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 and note sthat could have been causing more pain  She is right hand dominant  She is getting MRI on  and supposed to follow up with MD on  scheduled for surgery for her plantar fasciitis and notes her left hip is getting sore too  Pain  Current pain ratin  At worst pain ratin    Patient Goals  Patient goals for therapy: decreased pain  Patient goal: have no dificulty grabbing or lifting         Objective     Tenderness     Left Elbow   Tenderness in the cubital tunnel and medial epicondyle       Left Wrist/Hand   Tenderness in the medial epicondyle       Active Range of Motion     Left Elbow   Flexion: WFL  Extension: WFL  Forearm supination: 86 degrees   Forearm pronation: 118 degrees     Right Elbow   Normal active range of motion  Flexion: WFL  Extension: WFL  Forearm supination: 98 degrees   Forearm pronation: 88 degrees     Strength/Myotome Testing     Left Elbow   Flexion: 4+ (pain )  Extension: 4+  Forearm pronation: 4+ (pain)    Right Elbow   Flexion: 5  Extension: 5  Forearm supination: 5  Forearm pronation: 5    Additional Strength Details  Wrist flexion/extn 5/5 bilaterally     Right  neutral 76#  Left  neutral 81#       Flowsheet Rows      Most Recent Value   PT/OT G-Codes   Current Score  55   Projected Score  59             Precautions: none       Manuals 1/25            Graston to  NV            Laser  6'                                      Neuro Re-Ed                                                                                                        Ther Ex             UBE NV            Wrist flexion stretch 10" x10            Wrist extension strethc 10'x10            DB wrist flexion NV            DB wrist extension NV            DB sup/prone NV            DB radial dev NV            DB ulnar dev NV                         Ther Activity             Putty squeezes             Putty pinches             digi flexion  NV           therabar bending              therabar wringing    NV                      Modalities             HP nv

## 2021-01-26 ENCOUNTER — TRANSCRIBE ORDERS (OUTPATIENT)
Dept: PHYSICAL THERAPY | Facility: CLINIC | Age: 51
End: 2021-01-26

## 2021-01-26 DIAGNOSIS — M77.02 MEDIAL EPICONDYLITIS, LEFT ELBOW: Primary | ICD-10-CM

## 2021-01-27 ENCOUNTER — APPOINTMENT (OUTPATIENT)
Dept: PHYSICAL THERAPY | Facility: CLINIC | Age: 51
End: 2021-01-27
Payer: MEDICARE

## 2021-01-27 ENCOUNTER — TELEPHONE (OUTPATIENT)
Dept: OBGYN CLINIC | Facility: CLINIC | Age: 51
End: 2021-01-27

## 2021-01-27 ENCOUNTER — TELEPHONE (OUTPATIENT)
Dept: FAMILY MEDICINE CLINIC | Facility: CLINIC | Age: 51
End: 2021-01-27

## 2021-01-27 NOTE — TELEPHONE ENCOUNTER
Left message for pt that ultrasound was reassuring and to call if any further questions or concerns

## 2021-01-27 NOTE — TELEPHONE ENCOUNTER
Pt is calling c/o hard to take a deep; tightness; Pt was using inhaler at work  Concerned getting worse  Please advise  Should she be concrened?

## 2021-01-27 NOTE — TELEPHONE ENCOUNTER
Try to relax     Use rescue    If not working and gets severe to ER or urgent care     If cont need to eval for possible changing around her inhaler

## 2021-01-28 ENCOUNTER — OFFICE VISIT (OUTPATIENT)
Dept: PHYSICAL THERAPY | Facility: CLINIC | Age: 51
End: 2021-01-28
Payer: MEDICARE

## 2021-01-28 DIAGNOSIS — M77.02 MEDIAL EPICONDYLITIS, LEFT ELBOW: Primary | ICD-10-CM

## 2021-01-28 PROCEDURE — 97110 THERAPEUTIC EXERCISES: CPT

## 2021-01-28 PROCEDURE — 97140 MANUAL THERAPY 1/> REGIONS: CPT

## 2021-01-28 NOTE — PROGRESS NOTES
Daily Note     Today's date: 2021  Patient name: Melissa Cisneros  : 1970  MRN: [id number]  Referring provider: Yaw Mehta MD  Dx:   Encounter Diagnosis     ICD-10-CM    1  Medial epicondylitis, left elbow  M77 02                   Subjective: Pt reports her elbow was sore after last visit  Objective: See treatment diary below      Assessment: Tolerated treatment well  Patient would benefit from continued PT  No increased discomfort noted w/ TE performed  No adverse effects noted w/ Laser  Tolerates addition of Graston/STM to medial epicondyle well; tenderness to palpation noted  Plan: Progress treatment as tolerated          Precautions: none       Manuals            Graston to  NV 8'           Laser  6' 6'                                     Neuro Re-Ed                                                                                                        Ther Ex             UBE NV 2'/2'           Wrist flexion stretch 10" x10 10x10"           Wrist extension stretch 10'x10 10x10"           DB wrist flexion NV 1# 2x10           DB wrist extension NV 1# 2x10           DB sup/pronation NV 2# 2x10           DB radial dev NV 1# 2x10           DB ulnar dev NV 1# 2x10                        Ther Activity             Putty squeezes             Putty pinches             digi flexion  NV           therabar bending              therabar wringing    NV                      Modalities             HP nv

## 2021-01-29 ENCOUNTER — APPOINTMENT (OUTPATIENT)
Dept: PHYSICAL THERAPY | Facility: CLINIC | Age: 51
End: 2021-01-29
Payer: MEDICARE

## 2021-02-01 ENCOUNTER — APPOINTMENT (OUTPATIENT)
Dept: PHYSICAL THERAPY | Facility: CLINIC | Age: 51
End: 2021-02-01
Payer: MEDICARE

## 2021-02-04 ENCOUNTER — APPOINTMENT (OUTPATIENT)
Dept: PHYSICAL THERAPY | Facility: CLINIC | Age: 51
End: 2021-02-04
Payer: MEDICARE

## 2021-02-04 NOTE — PROGRESS NOTES
Daily Note     Today's date: 2021  Patient name: Enrico Choudhury  : 1970  MRN: [id number]  Referring provider: Lois Shah MD  Dx: No diagnosis found  Subjective: ***      Objective: See treatment diary below      Assessment: Tolerated treatment {Tolerated treatment :[id number]}   Patient {assessment:[id number]}      Plan: {PLAN:}     Precautions: none       Manuals           Graston to  NV 8' 8'          Laser  6' 6' 6'                                    Neuro Re-Ed                                                                                                        Ther Ex             UBE NV 2'/2' 2'/2'          Wrist flexion stretch 10" x10 10x10" 10x10"          Wrist extension stretch 10'x10 10x10" 10x10"          DB wrist flexion NV 1# 2x10 1# 2x10          DB wrist extension NV 1# 2x10 1# 2x10          DB sup/pronation NV 2# 2x10 2# 2x10          DB radial dev NV 1# 2x10 1# 2x10          DB ulnar dev NV 1# 2x10 1# 2x10                       Ther Activity             Putty squeezes             Putty pinches             digi flexion  NV Yellow x20          therabar bending              therabar wringing    NV                      Modalities             HP nv

## 2021-02-08 ENCOUNTER — OFFICE VISIT (OUTPATIENT)
Dept: PHYSICAL THERAPY | Facility: CLINIC | Age: 51
End: 2021-02-08
Payer: MEDICARE

## 2021-02-08 DIAGNOSIS — M77.02 MEDIAL EPICONDYLITIS, LEFT ELBOW: Primary | ICD-10-CM

## 2021-02-08 PROCEDURE — 97530 THERAPEUTIC ACTIVITIES: CPT | Performed by: PHYSICAL THERAPIST

## 2021-02-08 PROCEDURE — 97110 THERAPEUTIC EXERCISES: CPT | Performed by: PHYSICAL THERAPIST

## 2021-02-08 PROCEDURE — 97140 MANUAL THERAPY 1/> REGIONS: CPT | Performed by: PHYSICAL THERAPIST

## 2021-02-08 NOTE — PROGRESS NOTES
Daily Note     Today's date: 2021  Patient name: Alana Palma  : 1970  MRN: [id number]  Referring provider: Park Ventura MD  Dx:   Encounter Diagnosis     ICD-10-CM    1  Medial epicondylitis, left elbow  M77 02        Start Time: 1700  Stop Time: 1745  Total time in clinic (min): 45 minutes    Subjective: Pt reports, "I wore the brace at work today and it seems to help but its a little sore now though "       Objective: See treatment diary below      Assessment: Patient tolerated treatment well  Continued with established POC by primary therapist  Introduced some new exercises to focus on eccentric strengthening of the wrist and elbow  Patient had no adverse effects to exercise and was able to perform all requested sets and reps with minimal to no complaints of pain  Patient noted decreased pain and discomfort post manual treatment  Patient would benefit from continued PT  Plan: Progress treatment as tolerated           Precautions: none       Manuals           Graston NV 8' 8' ACL          Laser  6' 6' 8' ACL                                    Neuro Re-Ed                                                                                                        Ther Ex             UBE NV 2'/2' 2'/2'          Wrist flexion stretch 10" x10 10x10" 10x10"          Wrist extension stretch 10'x10 10x10" 10x10"          DB wrist flexion NV 1# 2x10 1# 2x10          DB wrist extension NV 1# 2x10 1# 2x10          DB sup/pronation NV 2# 2x10 2# 2x10          DB radial dev NV 1# 2x10 1# 2x10          DB ulnar dev NV 1# 2x10 1# 2x10                       Ther Activity             Putty squeezes             Putty pinches             digi flexion  NV 2x10 Gr           therabar bending    Red  2x10          therabar wringing   Red  2x10 NV                      Modalities             HP nv

## 2021-02-11 ENCOUNTER — APPOINTMENT (OUTPATIENT)
Dept: PHYSICAL THERAPY | Facility: CLINIC | Age: 51
End: 2021-02-11
Payer: MEDICARE

## 2021-02-15 ENCOUNTER — OFFICE VISIT (OUTPATIENT)
Dept: PHYSICAL THERAPY | Facility: CLINIC | Age: 51
End: 2021-02-15
Payer: MEDICARE

## 2021-02-15 DIAGNOSIS — M77.02 MEDIAL EPICONDYLITIS, LEFT ELBOW: Primary | ICD-10-CM

## 2021-02-15 PROCEDURE — 97110 THERAPEUTIC EXERCISES: CPT | Performed by: PHYSICAL THERAPIST

## 2021-02-15 PROCEDURE — 97112 NEUROMUSCULAR REEDUCATION: CPT | Performed by: PHYSICAL THERAPIST

## 2021-02-15 PROCEDURE — 97140 MANUAL THERAPY 1/> REGIONS: CPT | Performed by: PHYSICAL THERAPIST

## 2021-02-15 NOTE — PROGRESS NOTES
Daily Note     Today's date: 2/15/2021  Patient name: Leona Saavedra  : 1970  MRN: [id number]  Referring provider: Michael Ashraf MD  Dx:   Encounter Diagnosis     ICD-10-CM    1  Medial epicondylitis, left elbow  M77 02                   Subjective: Patient reports she did not wear brace today but she did wear it last week  She states her brace does help but she was not having much pain today  She only works 3 days a week  Objective: See treatment diary below      Assessment: Patient tolerated treatment well  She had no complaints of pain with manuals or exercises today  Tolerated progression in weights without complaints  Patinet's overall pain improved today  Patient would benefit from continued PT  Plan: Progress treatment as tolerated           Precautions: none       Manuals 1/25 1/28 2/8 2/15         Graston NV 8' 8' ACL 8'         Laser  6' 6' 8' ACL 8'                                   Neuro Re-Ed                                                                                                        Ther Ex             UBE NV 2'/2' 2'/2' 3'/3'         Wrist flexion stretch 10" x10 10x10" 10x10" 10x10"          Wrist extension stretch 10'x10 10x10" 10x10" 10x10"         DB wrist flexion NV 1# 2x10 1# 2x10 2# 2x10         DB wrist extension NV 1# 2x10 1# 2x10 2# 2x10          DB sup/pronation NV 2# 2x10 2# 2x10 2# 2x10          DB radial dev NV 1# 2x10 1# 2x10 2# 2x10         DB ulnar dev NV 1# 2x10 1# 2x10                       Ther Activity             Putty squeezes             Putty pinches             digi flexion  NV 2x10 Gr           therabar bending    Red  2x10          therabar wringing   Red  2x10 NV                      Modalities             HP nv

## 2021-02-17 ENCOUNTER — HOSPITAL ENCOUNTER (OUTPATIENT)
Dept: RADIOLOGY | Age: 51
Discharge: HOME | End: 2021-02-17
Payer: MEDICARE

## 2021-02-17 ENCOUNTER — TRANSCRIBE ORDERS (OUTPATIENT)
Dept: ADMINISTRATIVE | Age: 51
End: 2021-02-17

## 2021-02-17 ENCOUNTER — CONSULT (OUTPATIENT)
Dept: FAMILY MEDICINE CLINIC | Facility: CLINIC | Age: 51
End: 2021-02-17
Payer: MEDICARE

## 2021-02-17 ENCOUNTER — LAB (OUTPATIENT)
Dept: LAB | Age: 51
End: 2021-02-17
Payer: MEDICARE

## 2021-02-17 ENCOUNTER — APPOINTMENT (OUTPATIENT)
Dept: RADIOLOGY | Age: 51
End: 2021-02-17
Payer: MEDICARE

## 2021-02-17 VITALS
SYSTOLIC BLOOD PRESSURE: 126 MMHG | HEIGHT: 67 IN | DIASTOLIC BLOOD PRESSURE: 70 MMHG | OXYGEN SATURATION: 96 % | HEART RATE: 81 BPM | RESPIRATION RATE: 16 BRPM | TEMPERATURE: 96.6 F | WEIGHT: 272.8 LBS | BODY MASS INDEX: 42.82 KG/M2

## 2021-02-17 DIAGNOSIS — E78.2 MIXED HYPERLIPIDEMIA: Primary | ICD-10-CM

## 2021-02-17 DIAGNOSIS — M72.2 PLANTAR FASCIITIS: ICD-10-CM

## 2021-02-17 DIAGNOSIS — Z01.818 OTHER SPECIFIED PRE-OPERATIVE EXAMINATION: Primary | ICD-10-CM

## 2021-02-17 DIAGNOSIS — Z01.818 OTHER SPECIFIED PRE-OPERATIVE EXAMINATION: ICD-10-CM

## 2021-02-17 DIAGNOSIS — E78.2 MIXED HYPERLIPIDEMIA: ICD-10-CM

## 2021-02-17 DIAGNOSIS — Z01.818 PREOP EXAMINATION: ICD-10-CM

## 2021-02-17 DIAGNOSIS — E03.9 HYPOTHYROIDISM, UNSPECIFIED TYPE: ICD-10-CM

## 2021-02-17 DIAGNOSIS — M77.02 MEDIAL EPICONDYLITIS OF LEFT ELBOW: ICD-10-CM

## 2021-02-17 DIAGNOSIS — G56.22 LESION OF LEFT ULNAR NERVE: ICD-10-CM

## 2021-02-17 DIAGNOSIS — M24.575 CONTRACTURE, LEFT FOOT: Primary | ICD-10-CM

## 2021-02-17 PROBLEM — J98.4 RESTRICTIVE LUNG DISEASE: Status: ACTIVE | Noted: 2017-02-01

## 2021-02-17 PROBLEM — K62.5 HEMORRHAGE OF RECTUM AND ANUS: Status: ACTIVE | Noted: 2017-10-02

## 2021-02-17 PROBLEM — M25.561 ACUTE PAIN OF RIGHT KNEE: Status: ACTIVE | Noted: 2018-06-12

## 2021-02-17 LAB
ALBUMIN SERPL BCP-MCNC: 3.7 G/DL (ref 3.5–5)
ALP SERPL-CCNC: 68 U/L (ref 46–116)
ALT SERPL W P-5'-P-CCNC: 16 U/L (ref 12–78)
ANION GAP SERPL CALCULATED.3IONS-SCNC: 6 MMOL/L (ref 4–13)
AST SERPL W P-5'-P-CCNC: 13 U/L (ref 5–45)
BASOPHILS # BLD AUTO: 0.03 THOUSANDS/ΜL (ref 0–0.1)
BASOPHILS NFR BLD AUTO: 0 % (ref 0–1)
BILIRUB SERPL-MCNC: 0.36 MG/DL (ref 0.2–1)
BUN SERPL-MCNC: 10 MG/DL (ref 5–25)
CALCIUM SERPL-MCNC: 9.2 MG/DL (ref 8.3–10.1)
CHLORIDE SERPL-SCNC: 105 MMOL/L (ref 100–108)
CHOLEST SERPL-MCNC: 265 MG/DL (ref 50–200)
CO2 SERPL-SCNC: 28 MMOL/L (ref 21–32)
CREAT SERPL-MCNC: 0.64 MG/DL (ref 0.6–1.3)
EOSINOPHIL # BLD AUTO: 0.05 THOUSAND/ΜL (ref 0–0.61)
EOSINOPHIL NFR BLD AUTO: 1 % (ref 0–6)
ERYTHROCYTE [DISTWIDTH] IN BLOOD BY AUTOMATED COUNT: 12.2 % (ref 11.6–15.1)
GFR SERPL CREATININE-BSD FRML MDRD: 105 ML/MIN/1.73SQ M
GLUCOSE P FAST SERPL-MCNC: 91 MG/DL (ref 65–99)
HCT VFR BLD AUTO: 46.4 % (ref 34.8–46.1)
HDLC SERPL-MCNC: 74 MG/DL
HGB BLD-MCNC: 14.7 G/DL (ref 11.5–15.4)
IMM GRANULOCYTES # BLD AUTO: 0.08 THOUSAND/UL (ref 0–0.2)
IMM GRANULOCYTES NFR BLD AUTO: 1 % (ref 0–2)
INR PPP: 0.95 (ref 0.84–1.19)
LDLC SERPL CALC-MCNC: 165 MG/DL (ref 0–100)
LYMPHOCYTES # BLD AUTO: 2.51 THOUSANDS/ΜL (ref 0.6–4.47)
LYMPHOCYTES NFR BLD AUTO: 25 % (ref 14–44)
MCH RBC QN AUTO: 30.2 PG (ref 26.8–34.3)
MCHC RBC AUTO-ENTMCNC: 31.7 G/DL (ref 31.4–37.4)
MCV RBC AUTO: 96 FL (ref 82–98)
MONOCYTES # BLD AUTO: 0.84 THOUSAND/ΜL (ref 0.17–1.22)
MONOCYTES NFR BLD AUTO: 8 % (ref 4–12)
NEUTROPHILS # BLD AUTO: 6.71 THOUSANDS/ΜL (ref 1.85–7.62)
NEUTS SEG NFR BLD AUTO: 65 % (ref 43–75)
NONHDLC SERPL-MCNC: 191 MG/DL
NRBC BLD AUTO-RTO: 0 /100 WBCS
PMV BLD AUTO: 10.7 FL (ref 8.9–12.7)
POTASSIUM SERPL-SCNC: 3.8 MMOL/L (ref 3.5–5.3)
PROT SERPL-MCNC: 7.5 G/DL (ref 6.4–8.2)
PROTHROMBIN TIME: 12.6 SECONDS (ref 11.6–14.5)
RBC # BLD AUTO: 4.86 MILLION/UL (ref 3.81–5.12)
SODIUM SERPL-SCNC: 139 MMOL/L (ref 136–145)
T4 FREE SERPL-MCNC: 0.9 NG/DL (ref 0.76–1.46)
TRIGL SERPL-MCNC: 130 MG/DL
TSH SERPL DL<=0.05 MIU/L-ACNC: 5.19 UIU/ML (ref 0.36–3.74)
WBC # BLD AUTO: 10.22 THOUSAND/UL (ref 4.31–10.16)

## 2021-02-17 PROCEDURE — 71046 X-RAY EXAM CHEST 2 VIEWS: CPT

## 2021-02-17 PROCEDURE — 80053 COMPREHEN METABOLIC PANEL: CPT

## 2021-02-17 PROCEDURE — 84443 ASSAY THYROID STIM HORMONE: CPT

## 2021-02-17 PROCEDURE — 36415 COLL VENOUS BLD VENIPUNCTURE: CPT

## 2021-02-17 PROCEDURE — 80061 LIPID PANEL: CPT

## 2021-02-17 PROCEDURE — G1004 CDSM NDSC: HCPCS

## 2021-02-17 PROCEDURE — 84439 ASSAY OF FREE THYROXINE: CPT

## 2021-02-17 PROCEDURE — 85610 PROTHROMBIN TIME: CPT

## 2021-02-17 PROCEDURE — 99214 OFFICE O/P EST MOD 30 MIN: CPT | Performed by: NURSE PRACTITIONER

## 2021-02-17 PROCEDURE — 85025 COMPLETE CBC W/AUTO DIFF WBC: CPT

## 2021-02-17 PROCEDURE — 73223 MRI JOINT UPR EXTR W/O&W/DYE: CPT

## 2021-02-17 PROCEDURE — A9585 GADOBUTROL INJECTION: HCPCS | Performed by: RADIOLOGY

## 2021-02-17 RX ADMIN — GADOBUTROL 12 ML: 604.72 INJECTION INTRAVENOUS at 12:51

## 2021-02-17 NOTE — PROGRESS NOTES
[facility] PRACTICE PRE-OPERATIVE EVALUATION  [facility] PHYSICIAN GROUP - [facility]    NAME: Jessi Kat  AGE: 53 y o  SEX: female  : 1970     DATE: 2021    Family Practice Pre-Operative Evaluation      Chief Complaint: Pre-operative Evaluation     Surgery: left foot contracture and plantar fascitis  Anticipated Date of Surgery:   Referring Provider: Dr Pauly Larson      History of Present Illness:     Jessi Kat is a 53 y o  female who presents to the office today for a preoperative consultation at the request of surgeon, Dr Mo Cartagena, who plans on performing left foot surgery on   Planned anesthesia is unknown to me  Patient has a bleeding risk of: no recent abnormal bleeding  Patient does not have objections to receiving blood products if needed  Current anti-platelet/anti-coagulation medications that the patient is prescribed includes: none  Assessment of Chronic Conditions:   - Asthma: stable   - Hypertension: stable     Assessment of Cardiac Risk:  · Denies unstable or severe angina or MI in the last 6 weeks or history of stent placement in the last year   · Denies decompensated heart failure (e g  New onset heart failure, NYHA functional class IV heart failure, or worsening existing heart failure)  · Denies significant arrhythmias such as high grade AV block, symptomatic ventricular arrhythmia, newly recognized ventricular tachycardia, supraventricular tachycardia with resting heart rate >100, or symptomatic bradycardia  · Denies severe heart valve disease including aortic stenosis or symptomatic mitral stenosis     Exercise Capacity:  · Able to walk 4 blocks without symptoms?: Yes  · Able to walk 2 flights without symptoms?: Yes    Prior Anesthesia Reactions: No     Personal history of venous thromboembolic disease? No    History of steroid use for >2 weeks within last year?  No         Review of Systems:     Review of Systems   Constitutional: Negative for activity change, appetite change, fatigue, fever and unexpected weight change  HENT: Negative for congestion, dental problem, sinus pain and sneezing  Eyes: Negative for discharge and visual disturbance  Respiratory: Negative for cough and wheezing  Cardiovascular: Negative for chest pain  Gastrointestinal: Negative for abdominal pain, constipation, diarrhea, nausea and vomiting  Endocrine: Negative for polydipsia and polyuria  Genitourinary: Negative for dysuria and frequency  Musculoskeletal: Negative for arthralgias  Skin: Negative for rash  Allergic/Immunologic: Negative for environmental allergies and food allergies  Neurological: Negative for light-headedness and headaches  Hematological: Negative for adenopathy  Psychiatric/Behavioral: Negative for behavioral problems and sleep disturbance         Current Problem List:     Patient Active Problem List   Diagnosis    Asthma    Yan's esophagus determined by biopsy    Benign essential hypertension    Schizoaffective disorder, bipolar type ([address]    Chronic low back pain    DDD (degenerative disc disease), lumbar    Dysphagia    Edema    Family history of renal cancer    Headache    Hypothyroidism    Microhematuria    Morbid obesity ([address] )    MAG (obstructive sleep apnea)    Precordial pain    Spondylosis of lumbosacral joint without myelopathy    Stress incontinence in female    Abdominal pain    Hypertension    Overactive bladder    Primary osteoarthritis of both knees    Urinary incontinence    Intractable migraine without aura and without status migrainosus    Chronic tension-type headache, not intractable    Major depressive disorder    Sjogren's syndrome ([address] )    Marijuana abuse    Chronic obstructive pulmonary disease with acute exacerbation (HCC)    Mixed hyperlipidemia    Class 3 severe obesity due to excess calories with serious comorbidity and body mass index (BMI) of 40 0 to 44 9 in adult ([facility]    Memory difficulties    Eye discharge    COVID-19 virus infection    Morbid obesity with BMI of 40 0-44 9, adult ([facility] )    Acute pain of right knee    Encounter for long-term (current) use of other medications    Hemorrhage of rectum and anus    Medial epicondylitis of left elbow    Restrictive lung disease       Allergies:      Allergies   Allergen Reactions    Percocet [Oxycodone-Acetaminophen] Headache     Severe headaches       Current Medications:       Current Outpatient Medications:     albuterol (ProAir HFA) 90 mcg/act inhaler, Inhale 2 puffs every 4 (four) hours as needed for wheezing, Disp: 8 5 g, Rfl: 1    amLODIPine (NORVASC) 5 mg tablet, Take 1 tablet (5 mg total) by mouth daily, Disp: 30 tablet, Rfl: 5    buPROPion (WELLBUTRIN XL) 300 mg 24 hr tablet, , Disp: , Rfl:     carboxymethylcellulose 0 5 % SOLN, Administer 1 drop to both eyes 3 (three) times a day as needed for dry eyes, Disp: 70 mL, Rfl: 1    colestipol (COLESTID) 1 g tablet, Take 1 tablet (1 g total) by mouth 2 (two) times a day, Disp: 60 tablet, Rfl: 0    divalproex sodium (DEPAKOTE) 500 mg EC tablet, , Disp: , Rfl:     estradiol (ESTRACE) 1 mg tablet, Take 1 tablet (1 mg total) by mouth daily, Disp: 90 tablet, Rfl: 3    furosemide (LASIX) 20 mg tablet, TAKE 1 TABLET BY MOUTH DAILY, Disp: 30 tablet, Rfl: 3    levothyroxine 50 mcg tablet, Take 1 tablet (50 mcg total) by mouth daily in the early morning, Disp: 30 tablet, Rfl: 5    loratadine (CLARITIN) 10 mg tablet, Take 1 tablet (10 mg total) by mouth daily, Disp: 30 tablet, Rfl: 5    losartan (COZAAR) 50 mg tablet, Take 50 mg by mouth daily , Disp: , Rfl:     lurasidone (LATUDA) 80 mg tablet, Take 60 mg by mouth daily with dinner , Disp: , Rfl:     metoprolol tartrate (LOPRESSOR) 25 mg tablet, TAKE ONE TABLET BY MOUTH TWICE A DAY, Disp: 60 tablet, Rfl: 4    pilocarpine (SALAGEN) 5 mg tablet, Take 1 tablet (5 mg total) by mouth 2 (two) times a day, Disp: 60 tablet, Rfl: 5    topiramate (TOPAMAX) 100 mg tablet, Take 100 mg by mouth 2 (two) times a day, Disp: , Rfl:     TOVIAZ 4 MG TB24, Take 1 tablet (4 mg total) by mouth daily, Disp: , Rfl: 2    traZODone (DESYREL) 150 mg tablet, , Disp: , Rfl:     Past Medical History:       Past Medical History:   Diagnosis Date    Anxiety     Arthritis     oa cassandra knees    Asthma     good control- no medications    Yan's esophagus     Bipolar affective disorder ([facility]     Breathing difficulty     After vascular surgery was diagnosed with sleep apnea    Chronic pain disorder     lumbar    CPAP (continuous positive airway pressure) dependence     Degenerative disc disease at L5-S1 level     Deliberate self-cutting     Depression 09/16/2008    Disease of thyroid gland     hypo    MARTINEZ (dyspnea on exertion)     Drug overdose 10/28/2008    suicide attempt    Dysphagia     Dyspnea     Edema     BLE    GERD (gastroesophageal reflux disease)     High blood pressure     Knee pain, bilateral     Especially right    Migraines     Obese     Overactive bladder     Sjogren's disease ([facility]     Sleep apnea     Stress incontinence     Suicidal ideations     Umbilical hernia     surgical repair today 4/24/2019    Use of cane as ambulatory aid     awaiting b/l knee replacement    Wears glasses         Past Surgical History:   Procedure Laterality Date    BREAST BIOPSY Right 1989    benign    CARPAL TUNNEL RELEASE Left     CHOLECYSTECTOMY  05/2003    Laparoscopic    COLONOSCOPY      01/12/2009    DILATION AND CURETTAGE OF UTERUS      ELBOW SURGERY Left     x2   No hardware    ESOPHAGOGASTRODUODENOSCOPY      FOOT SURGERY Left     Plantar fasciotomy    HYSTERECTOMY      laporoscopic, partial    KNEE ARTHROSCOPY Bilateral     OOPHORECTOMY Left 10/2015    FL ANAL SPHINCTEROTOMY N/A 8/31/2018    Procedure: EUA, LEFT PARTIAL INTERNAL SPHINCTEROTOMY;  Surgeon: Javid Schneider MD;  Location[facility] MAIN OR;  Service: Colorectal    NE REPAIR UMBILICAL QZJL,8+I/O,ERVQF N/A 4/24/2019    Procedure: REPAIR HERNIA UMBILICAL LAPAROSCOPIC W/ ROBOTICS;  Surgeon: Michael Rose MD;  Location: [facility] Main OR;  Service: General    REDUCTION MAMMAPLASTY Bilateral 1999    REPAIR LACERATION Left     left hand-5/18/2009    TONSILECTOMY AND ADNOIDECTOMY      TONSILLECTOMY      VEIN LIGATION Bilateral     WISDOM TOOTH EXTRACTION          Family History   Problem Relation Age of Onset    Kidney cancer Mother     Diabetes Mother     Colon cancer Family     No Known Problems Father     No Known Problems Sister     Colon cancer Paternal Uncle     Colon cancer Maternal Uncle     Colon cancer Maternal Uncle         Social History     Socioeconomic History    Marital status: Single     Spouse name: Not on file    Number of children: Not on file    Years of education: Not on file    Highest education level: Not on file   Occupational History    Not on file   Social Needs    Financial resource strain: Not on file    Food insecurity     Worry: Not on file     Inability: Not on file    Transportation needs     Medical: Not on file     Non-medical: Not on file   Tobacco Use    Smoking status: Former Smoker     Packs/day: 3 00     Types: Cigarettes     Quit date: 1/2/2018     Years since quitting: 3 1    Smokeless tobacco: Never Used    Tobacco comment: Started at age 14     Substance and Sexual Activity    Alcohol use: Not Currently     Comment: Recovering alcoholic    Drug use: Yes     Types: Marijuana     Comment: drug overdose intentional-ambien and risperidal  Occasional marijuana    Sexual activity: Not Currently   Lifestyle    Physical activity     Days per week: Not on file     Minutes per session: Not on file    Stress: Not on file   Relationships    Social connections     Talks on phone: Not on file     Gets together: Not on file     Attends [employer] service: Not on file     Active member of club or organization: Not on file     Attends meetings of clubs or organizations: Not on file     Relationship status: Not on file    Intimate partner violence     Fear of current or ex partner: Not on file     Emotionally abused: Not on file     Physically abused: Not on file     Forced sexual activity: Not on file   Other Topics Concern    Not on file   Social History Narrative    Not on file        Physical Exam:     /70 (BP Location: Left arm, Patient Position: Sitting, Cuff Size: Large)   Pulse 81   Temp (!) 96 6 °F (35 9 °C) (Tympanic)   Resp 16   Ht 5' 7" (1 702 m)   Wt 124 kg (272 lb 12 8 oz)   SpO2 96%   BMI 42 73 kg/m²     Physical Exam  Vitals signs reviewed  Constitutional:       Appearance: Normal appearance  She is well-developed  She is obese  HENT:      Head: Normocephalic  Right Ear: Tympanic membrane, ear canal and external ear normal       Left Ear: Tympanic membrane, ear canal and external ear normal       Nose: Nose normal    Eyes:      General:         Right eye: No discharge  Left eye: No discharge  Conjunctiva/sclera: Conjunctivae normal       Pupils: Pupils are equal, round, and reactive to light  Neck:      Musculoskeletal: Normal range of motion and neck supple  Thyroid: No thyromegaly  Cardiovascular:      Rate and Rhythm: Normal rate and regular rhythm  Heart sounds: Normal heart sounds  No murmur  Pulmonary:      Effort: Pulmonary effort is normal       Breath sounds: Normal breath sounds  Abdominal:      General: Bowel sounds are normal       Palpations: Abdomen is soft  Tenderness: There is no abdominal tenderness  Musculoskeletal: Normal range of motion  Lymphadenopathy:      Cervical: No cervical adenopathy  Skin:     General: Skin is warm  Findings: No rash  Neurological:      Mental Status: She is alert and oriented to person, place, and time     Psychiatric:         Behavior: Behavior normal           Data:     Pre-operative work-up    Laboratory Results: reviewed labs on 2/19     EKG: not ordered    Chest x-ray: review CXR on 2/19      Previous cardiopulmonary studies within the past year:  · Echocardiogram: no  · Cardiac Catheterization: no  · Stress Test: no  · Pulmonary Function Testing: no      Assessment & Recommendations:     1  Contracture, left foot     2  Plantar fasciitis     3  Preop examination         Pre-Op Evaluation Assessment  53 y o  female with planned surgery: left foot suregry   Known risk factors for perioperative complications: None  hx of asthma and hypertension    Current medications which may produce withdrawal symptoms if withheld perioperatively: none  Pre-Op Evaluation Plan  1  Further preoperative workup as follows:   - None; no further preoperative work-up is required    2  Medication Management/Recommendations:   - Patient should continue antihypertensive medications up through and including the day of surgery  3  Prophylaxis for cardiac events with perioperative beta-blockers: should be considered, specific regimen per anesthesia  4  Patient requires further consultation with: None    Clearance  Patient is CLEARED for surgery without any additional cardiac testing  Pending PAT     PAT REVIEWED ON 2/19 AND CLEARED FOR SURGERY   BMI Counseling: Body mass index is 42 73 kg/m²  The BMI is above normal  Nutrition recommendations include decreasing portion sizes, encouraging healthy choices of fruits and vegetables, decreasing fast food intake, consuming healthier snacks, limiting drinks that contain sugar and moderation in carbohydrate intake  Exercise recommendations include exercising 3-5 times per week  No pharmacotherapy was ordered              [place] [address] [place] [id number]  Phone#  [phone]  Fax#  [phone]

## 2021-02-17 NOTE — H&P (VIEW-ONLY)
[facility] PRE-OPERATIVE EVALUATION  [facility] PHYSICIAN GROUP - [facility]    NAME: Heavenly Eden  AGE: 53 y o  SEX: female  : 1970     DATE: 2021    [facility] Pre-Operative Evaluation      Chief Complaint: Pre-operative Evaluation     Surgery: left foot contracture and plantar fascitis  Anticipated Date of Surgery:   Referring Provider: Dr Terese Mayer      History of Present Illness:     Heavenly Eden is a 53 y o  female who presents to the office today for a preoperative consultation at the request of surgeon, Dr Santiago Frias, who plans on performing left foot surgery on   Planned anesthesia is unknown to me  Patient has a bleeding risk of: no recent abnormal bleeding  Patient does not have objections to receiving blood products if needed  Current anti-platelet/anti-coagulation medications that the patient is prescribed includes: none  Assessment of Chronic Conditions:   - Asthma: stable   - Hypertension: stable     Assessment of Cardiac Risk:  · Denies unstable or severe angina or MI in the last 6 weeks or history of stent placement in the last year   · Denies decompensated heart failure (e g  New onset heart failure, NYHA functional class IV heart failure, or worsening existing heart failure)  · Denies significant arrhythmias such as high grade AV block, symptomatic ventricular arrhythmia, newly recognized ventricular tachycardia, supraventricular tachycardia with resting heart rate >100, or symptomatic bradycardia  · Denies severe heart valve disease including aortic stenosis or symptomatic mitral stenosis     Exercise Capacity:  · Able to walk 4 blocks without symptoms?: Yes  · Able to walk 2 flights without symptoms?: Yes    Prior Anesthesia Reactions: No     Personal history of venous thromboembolic disease? No    History of steroid use for >2 weeks within last year?  No         Review of Systems:     Review of Systems   Constitutional: Negative for activity change, appetite change, fatigue, fever and unexpected weight change  HENT: Negative for congestion, dental problem, sinus pain and sneezing  Eyes: Negative for discharge and visual disturbance  Respiratory: Negative for cough and wheezing  Cardiovascular: Negative for chest pain  Gastrointestinal: Negative for abdominal pain, constipation, diarrhea, nausea and vomiting  Endocrine: Negative for polydipsia and polyuria  Genitourinary: Negative for dysuria and frequency  Musculoskeletal: Negative for arthralgias  Skin: Negative for rash  Allergic/Immunologic: Negative for environmental allergies and food allergies  Neurological: Negative for light-headedness and headaches  Hematological: Negative for adenopathy  Psychiatric/Behavioral: Negative for behavioral problems and sleep disturbance         Current Problem List:     Patient Active Problem List   Diagnosis    Asthma    Yan's esophagus determined by biopsy    Benign essential hypertension    Schizoaffective disorder, bipolar type ([address]    Chronic low back pain    DDD (degenerative disc disease), lumbar    Dysphagia    Edema    Family history of renal cancer    Headache    Hypothyroidism    Microhematuria    Morbid obesity ([address] )    MAG (obstructive sleep apnea)    Precordial pain    Spondylosis of lumbosacral joint without myelopathy    Stress incontinence in female    Abdominal pain    Hypertension    Overactive bladder    Primary osteoarthritis of both knees    Urinary incontinence    Intractable migraine without aura and without status migrainosus    Chronic tension-type headache, not intractable    Major depressive disorder    Sjogren's syndrome ([address] )    Marijuana abuse    Chronic obstructive pulmonary disease with acute exacerbation (HCC)    Mixed hyperlipidemia    Class 3 severe obesity due to excess calories with serious comorbidity and body mass index (BMI) of 40 0 to 44 9 in adult ([facility]    Memory difficulties    Eye discharge    COVID-19 virus infection    Morbid obesity with BMI of 40 0-44 9, adult ([facility]    Acute pain of right knee    Encounter for long-term (current) use of other medications    Hemorrhage of rectum and anus    Medial epicondylitis of left elbow    Restrictive lung disease       Allergies:      Allergies   Allergen Reactions    Percocet [Oxycodone-Acetaminophen] Headache     Severe headaches       Current Medications:       Current Outpatient Medications:     albuterol (ProAir HFA) 90 mcg/act inhaler, Inhale 2 puffs every 4 (four) hours as needed for wheezing, Disp: 8 5 g, Rfl: 1    amLODIPine (NORVASC) 5 mg tablet, Take 1 tablet (5 mg total) by mouth daily, Disp: 30 tablet, Rfl: 5    buPROPion (WELLBUTRIN XL) 300 mg 24 hr tablet, , Disp: , Rfl:     carboxymethylcellulose 0 5 % SOLN, Administer 1 drop to both eyes 3 (three) times a day as needed for dry eyes, Disp: 70 mL, Rfl: 1    colestipol (COLESTID) 1 g tablet, Take 1 tablet (1 g total) by mouth 2 (two) times a day, Disp: 60 tablet, Rfl: 0    divalproex sodium (DEPAKOTE) 500 mg EC tablet, , Disp: , Rfl:     estradiol (ESTRACE) 1 mg tablet, Take 1 tablet (1 mg total) by mouth daily, Disp: 90 tablet, Rfl: 3    furosemide (LASIX) 20 mg tablet, TAKE 1 TABLET BY MOUTH DAILY, Disp: 30 tablet, Rfl: 3    levothyroxine 50 mcg tablet, Take 1 tablet (50 mcg total) by mouth daily in the early morning, Disp: 30 tablet, Rfl: 5    loratadine (CLARITIN) 10 mg tablet, Take 1 tablet (10 mg total) by mouth daily, Disp: 30 tablet, Rfl: 5    losartan (COZAAR) 50 mg tablet, Take 50 mg by mouth daily , Disp: , Rfl:     lurasidone (LATUDA) 80 mg tablet, Take 60 mg by mouth daily with dinner , Disp: , Rfl:     metoprolol tartrate (LOPRESSOR) 25 mg tablet, TAKE ONE TABLET BY MOUTH TWICE A DAY, Disp: 60 tablet, Rfl: 4    pilocarpine (SALAGEN) 5 mg tablet, Take 1 tablet (5 mg total) by mouth 2 (two) times a day, Disp: 60 tablet, Rfl: 5    topiramate (TOPAMAX) 100 mg tablet, Take 100 mg by mouth 2 (two) times a day, Disp: , Rfl:     TOVIAZ 4 MG TB24, Take 1 tablet (4 mg total) by mouth daily, Disp: , Rfl: 2    traZODone (DESYREL) 150 mg tablet, , Disp: , Rfl:     Past Medical History:       Past Medical History:   Diagnosis Date    Anxiety     Arthritis     oa cassandra knees    Asthma     good control- no medications    Yan's esophagus     Bipolar affective disorder [facility]     Breathing difficulty     After vascular surgery was diagnosed with sleep apnea    Chronic pain disorder     lumbar    CPAP (continuous positive airway pressure) dependence     Degenerative disc disease at L5-S1 level     Deliberate self-cutting     Depression 09/16/2008    Disease of thyroid gland     hypo    MARTINEZ (dyspnea on exertion)     Drug overdose 10/28/2008    suicide attempt    Dysphagia     Dyspnea     Edema     BLE    GERD (gastroesophageal reflux disease)     High blood pressure     Knee pain, bilateral     Especially right    Migraines     Obese     Overactive bladder     Sjogren's disease ([facility]     Sleep apnea     Stress incontinence     Suicidal ideations     Umbilical hernia     surgical repair today 4/24/2019    Use of cane as ambulatory aid     awaiting b/l knee replacement    Wears glasses         Past Surgical History:   Procedure Laterality Date    BREAST BIOPSY Right 1989    benign    CARPAL TUNNEL RELEASE Left     CHOLECYSTECTOMY  05/2003    Laparoscopic    COLONOSCOPY      01/12/2009    DILATION AND CURETTAGE OF UTERUS      ELBOW SURGERY Left     x2   No hardware    ESOPHAGOGASTRODUODENOSCOPY      FOOT SURGERY Left     Plantar fasciotomy    HYSTERECTOMY      laporoscopic, partial    KNEE ARTHROSCOPY Bilateral     OOPHORECTOMY Left 10/2015    CA ANAL SPHINCTEROTOMY N/A 8/31/2018    Procedure: EUA, LEFT PARTIAL INTERNAL SPHINCTEROTOMY;  Surgeon: Vani Quach MD;  [facility] MAIN OR;  Service: Colorectal    MD REPAIR UMBILICAL TPQK,6+L/A,DFGHX N/A 4/24/2019    Procedure: REPAIR HERNIA UMBILICAL LAPAROSCOPIC W/ ROBOTICS;  Surgeon: Kim Campbell MD;  Location: [facility];  Service: General    REDUCTION MAMMAPLASTY Bilateral 1999    REPAIR LACERATION Left     left hand-5/18/2009    TONSILECTOMY AND ADNOIDECTOMY      TONSILLECTOMY      VEIN LIGATION Bilateral     WISDOM TOOTH EXTRACTION          Family History   Problem Relation Age of Onset    Kidney cancer Mother     Diabetes Mother     Colon cancer Family     No Known Problems Father     No Known Problems Sister     Colon cancer Paternal Uncle     Colon cancer Maternal Uncle     Colon cancer Maternal Uncle         Social History     Socioeconomic History    Marital status: Single     Spouse name: Not on file    Number of children: Not on file    Years of education: Not on file    Highest education level: Not on file   Occupational History    Not on file   Social Needs    Financial resource strain: Not on file    Food insecurity     Worry: Not on file     Inability: Not on file    Transportation needs     Medical: Not on file     Non-medical: Not on file   Tobacco Use    Smoking status: Former Smoker     Packs/day: 3 00     Types: Cigarettes     Quit date: 1/2/2018     Years since quitting: 3 1    Smokeless tobacco: Never Used    Tobacco comment: Started at age 14     Substance and Sexual Activity    Alcohol use: Not Currently     Comment: Recovering alcoholic    Drug use: Yes     Types: Marijuana     Comment: drug overdose intentional-ambien and risperidal  Occasional marijuana    Sexual activity: Not Currently   Lifestyle    Physical activity     Days per week: Not on file     Minutes per session: Not on file    Stress: Not on file   Relationships    Social connections     Talks on phone: Not on file     Gets together: Not on file     Attends Taoism service: Not on file     Active member of club or organization: Not on file     Attends meetings of clubs or organizations: Not on file     Relationship status: Not on file    Intimate partner violence     Fear of current or ex partner: Not on file     Emotionally abused: Not on file     Physically abused: Not on file     Forced sexual activity: Not on file   Other Topics Concern    Not on file   Social History Narrative    Not on file        Physical Exam:     /70 (BP Location: Left arm, Patient Position: Sitting, Cuff Size: Large)   Pulse 81   Temp (!) 96 6 °F (35 9 °C) (Tympanic)   Resp 16   Ht 5' 7" (1 702 m)   Wt 124 kg (272 lb 12 8 oz)   SpO2 96%   BMI 42 73 kg/m²     Physical Exam  Vitals signs reviewed  Constitutional:       Appearance: Normal appearance  She is well-developed  She is obese  HENT:      Head: Normocephalic  Right Ear: Tympanic membrane, ear canal and external ear normal       Left Ear: Tympanic membrane, ear canal and external ear normal       Nose: Nose normal    Eyes:      General:         Right eye: No discharge  Left eye: No discharge  Conjunctiva/sclera: Conjunctivae normal       Pupils: Pupils are equal, round, and reactive to light  Neck:      Musculoskeletal: Normal range of motion and neck supple  Thyroid: No thyromegaly  Cardiovascular:      Rate and Rhythm: Normal rate and regular rhythm  Heart sounds: Normal heart sounds  No murmur  Pulmonary:      Effort: Pulmonary effort is normal       Breath sounds: Normal breath sounds  Abdominal:      General: Bowel sounds are normal       Palpations: Abdomen is soft  Tenderness: There is no abdominal tenderness  Musculoskeletal: Normal range of motion  Lymphadenopathy:      Cervical: No cervical adenopathy  Skin:     General: Skin is warm  Findings: No rash  Neurological:      Mental Status: She is alert and oriented to person, place, and time     Psychiatric:         Behavior: Behavior normal           Data:     Pre-operative work-up    Laboratory Results: reviewed labs on 2/19     EKG: not ordered    Chest x-ray: review CXR on 2/19      Previous cardiopulmonary studies within the past year:  · Echocardiogram: no  · Cardiac Catheterization: no  · Stress Test: no  · Pulmonary Function Testing: no      Assessment & Recommendations:     1  Contracture, left foot     2  Plantar fasciitis     3  Preop examination         Pre-Op Evaluation Assessment  53 y o  female with planned surgery: left foot suregry   Known risk factors for perioperative complications: None  hx of asthma and hypertension    Current medications which may produce withdrawal symptoms if withheld perioperatively: none  Pre-Op Evaluation Plan  1  Further preoperative workup as follows:   - None; no further preoperative work-up is required    2  Medication Management/Recommendations:   - Patient should continue antihypertensive medications up through and including the day of surgery  3  Prophylaxis for cardiac events with perioperative beta-blockers: should be considered, specific regimen per anesthesia  4  Patient requires further consultation with: None    Clearance  Patient is CLEARED for surgery without any additional cardiac testing  Pending PAT     PAT REVIEWED ON 2/19 AND CLEARED FOR SURGERY   BMI Counseling: Body mass index is 42 73 kg/m²  The BMI is above normal  Nutrition recommendations include decreasing portion sizes, encouraging healthy choices of fruits and vegetables, decreasing fast food intake, consuming healthier snacks, limiting drinks that contain sugar and moderation in carbohydrate intake  Exercise recommendations include exercising 3-5 times per week  No pharmacotherapy was ordered              [place]ILIA [place] [address] [address] [place] [id number]  Phone#  [phone]  Fax#  [phone]

## 2021-02-18 ENCOUNTER — APPOINTMENT (OUTPATIENT)
Dept: PHYSICAL THERAPY | Facility: CLINIC | Age: 51
End: 2021-02-18
Payer: MEDICARE

## 2021-02-18 NOTE — PRE-PROCEDURE INSTRUCTIONS
Pre-Surgery Instructions:   Medication Instructions    albuterol (ProAir HFA) 90 mcg/act inhaler Instructed patient per Anesthesia Guidelines   amLODIPine (NORVASC) 5 mg tablet Instructed patient per Anesthesia Guidelines   buPROPion (WELLBUTRIN XL) 300 mg 24 hr tablet Instructed patient per Anesthesia Guidelines   colestipol (COLESTID) 1 g tablet Instructed patient per Anesthesia Guidelines   divalproex sodium (DEPAKOTE) 500 mg EC tablet Instructed patient per Anesthesia Guidelines   estradiol (ESTRACE) 1 mg tablet Instructed patient per Anesthesia Guidelines   furosemide (LASIX) 20 mg tablet Instructed patient per Anesthesia Guidelines   levothyroxine 50 mcg tablet Instructed patient per Anesthesia Guidelines   loratadine (CLARITIN) 10 mg tablet Instructed patient per Anesthesia Guidelines   losartan (COZAAR) 50 mg tablet Instructed patient per Anesthesia Guidelines   lurasidone (LATUDA) 80 mg tablet Instructed patient per Anesthesia Guidelines   metoprolol tartrate (LOPRESSOR) 25 mg tablet Instructed patient per Anesthesia Guidelines   Neomycin-Polymyxin-Dexameth (MAXITROL OP) Instructed patient per Anesthesia Guidelines   pilocarpine (SALAGEN) 5 mg tablet Instructed patient per Anesthesia Guidelines   topiramate (TOPAMAX) 100 mg tablet Instructed patient per Anesthesia Guidelines   TOVIAZ 4 MG TB24 Instructed patient per Anesthesia Guidelines   traZODone (DESYREL) 150 mg tablet Instructed patient per Anesthesia Guidelines  Instructed to take Amlodipine, Bupropion, Depakote, Levothyroxine, Metoprolol, And Topamax with sip of water morning of surgery  May use Albuterol and Maxitrol eye ointment prn  No aspirin, NSAIDs, vitamins, or supplements 1 week before surgery

## 2021-02-19 ENCOUNTER — NURSE TRIAGE (OUTPATIENT)
Dept: OTHER | Facility: OTHER | Age: 51
End: 2021-02-19

## 2021-02-19 ENCOUNTER — TELEPHONE (OUTPATIENT)
Dept: FAMILY MEDICINE CLINIC | Facility: CLINIC | Age: 51
End: 2021-02-19

## 2021-02-19 ENCOUNTER — TELEMEDICINE (OUTPATIENT)
Dept: FAMILY MEDICINE CLINIC | Facility: CLINIC | Age: 51
End: 2021-02-19
Payer: MEDICARE

## 2021-02-19 VITALS — WEIGHT: 272 LBS | TEMPERATURE: 97.5 F | HEIGHT: 67 IN | BODY MASS INDEX: 42.69 KG/M2

## 2021-02-19 DIAGNOSIS — R10.32 LLQ PAIN: ICD-10-CM

## 2021-02-19 DIAGNOSIS — R10.9 LEFT FLANK PAIN: Primary | ICD-10-CM

## 2021-02-19 PROCEDURE — 99213 OFFICE O/P EST LOW 20 MIN: CPT | Performed by: NURSE PRACTITIONER

## 2021-02-19 NOTE — TELEPHONE ENCOUNTER
Pt states she has had nausea and abd pain since yesterday and states she thinks she needs to have a bowel movement  Pt did take 2 stool softeners throughout the night  Pt states she cannot begin process of getting ready for work due to discomfort  Advice offered per protocol

## 2021-02-19 NOTE — TELEPHONE ENCOUNTER
Reason for Disposition   [1] MILD-MODERATE pain AND [2] constant AND [3] present > 2 hours    Answer Assessment - Initial Assessment Questions  1  LOCATION: "Where does it hurt?"       Lower abd  2  RADIATION: "Does the pain shoot anywhere else?" (e g , chest, back)      Into back a little  3  ONSET: "When did the pain begin?" (e g , minutes, hours or days ago)       yesterday  4  SUDDEN: "Gradual or sudden onset?"      sudden  5  PATTERN "Does the pain come and go, or is it constant?"     - If constant: "Is it getting better, staying the same, or worsening?"       (Note: Constant means the pain never goes away completely; most serious pain is constant and it progresses)      - If intermittent: "How long does it last?" "Do you have pain now?"      (Note: Intermittent means the pain goes away completely between bouts)      Constant pain  6  SEVERITY: "How bad is the pain?"  (e g , Scale 1-10; mild, moderate, or severe)    - MILD (1-3): doesn't interfere with normal activities, abdomen soft and not tender to touch     - MODERATE (4-7): interferes with normal activities or awakens from sleep, tender to touch     - SEVERE (8-10): excruciating pain, doubled over, unable to do any normal activities       7  7  RECURRENT SYMPTOM: "Have you ever had this type of abdominal pain before?" If so, ask: "When was the last time?" and "What happened that time?"       Never as constant as this pain  8  CAUSE: "What do you think is causing the abdominal pain?"      I think I am constipated  9  RELIEVING/AGGRAVATING FACTORS: "What makes it better or worse?" (e g , movement, antacids, bowel movement)     Nothing makes it better or worse  10  OTHER SYMPTOMS: "Has there been any vomiting, diarrhea, constipation, or urine problems?"        Nausea  Had diarrhea for the last 2 days,   11   PREGNANCY: "Is there any chance you are pregnant?" "When was your last menstrual period?"        Partial hysterectomy    Protocols used: [facility]

## 2021-02-19 NOTE — PROGRESS NOTES
Virtual Regular Visit      Assessment/Plan:    Problem List Items Addressed This Visit     None      Visit Diagnoses     Left flank pain    -  Primary    LLQ pain            Due to being virtual visit unable to assess urine for blood   Reviewed limited colonoscopy report from 9/20 and only talks of polyps and nothing about possible diverticulosis     Also having decreased stream and flank pain with rotation to LLQ   May need to r/o stone    Will go to ER         Reason for visit is   Chief Complaint   Patient presents with    Abdominal Pain     sx's started yesterday    Nausea    Virtual Regular Visit        Encounter provider JHONATAN Carpenter    Provider located at [facility] AT [place] [address] [id number]      Recent Visits  No visits were found meeting these conditions  Showing recent visits within past 7 days and meeting all other requirements     Today's Visits  Date Type Provider Dept   02/19/21 [address] [place] [address], [address] [place] [address]   Showing today's visits and meeting all other requirements     Future Appointments  No visits were found meeting these conditions  Showing future appointments within next 150 days and meeting all other requirements        The patient was identified by name and date of birth  Maddison Cerna was informed that this is a telemedicine visit and that the visit is being conducted through [facility] and patient was informed that this is a secure, HIPAA-compliant platform  She agrees to proceed     My office door was closed  No one else was in the room  She acknowledged consent and understanding of privacy and security of the video platform  The patient has agreed to participate and understands they can discontinue the visit at any time  Patient is aware this is a billable service       Subjective  Maddison Cerna is a 53 y o  female started with abdominal pain yesterday    Has had ongoing issues with abdominal pain    Having nausea  had diarrhea past 2 days    Increasing stomach pain on left side    Increased urination but voiding small amounts    Nausea     NO burning on urination   Had GI appt yesterday but cancelled due to weather     Had colonoscopy 9;2020 and polyp found but no further report if has diverticulosis    Started in back left side and then rotated around to front         HPI     Past Medical History:   Diagnosis Date    Anxiety     Arthritis     oa cassandra knees    Asthma     good control- no medications    Yan's esophagus     Bipolar affective disorder (HCC)     Chronic pain disorder     lumbar    CPAP (continuous positive airway pressure) dependence     Degenerative disc disease at L5-S1 level     Deliberate self-cutting     Depression 09/16/2008    Disease of thyroid gland     hypo    MARTINEZ (dyspnea on exertion)     Drug overdose 10/28/2008    suicide attempt    Dysphagia     Dyspnea     Edema     BLE    GERD (gastroesophageal reflux disease)     High blood pressure     Hypertension     Knee pain, bilateral     Especially right    Migraines     Obese     Overactive bladder     Sjogren's disease ([address] )     Sleep apnea     Stress incontinence     Suicidal ideations     Umbilical hernia     surgical repair today 4/24/2019    Use of cane as ambulatory aid     awaiting b/l knee replacement    Wears glasses        Past Surgical History:   Procedure Laterality Date    BREAST BIOPSY Right 1989    benign    CARPAL TUNNEL RELEASE Left     CHOLECYSTECTOMY  05/2003    Laparoscopic    COLONOSCOPY      01/12/2009    DILATION AND CURETTAGE OF UTERUS      ELBOW SURGERY Left     x2   No hardware    ESOPHAGOGASTRODUODENOSCOPY      FOOT SURGERY Left     Plantar fasciotomy    HYSTERECTOMY      laporoscopic, partial    KNEE ARTHROSCOPY Bilateral     OOPHORECTOMY Left 10/2015    [place] ANAL SPHINCTEROTOMY N/A 8/31/2018    Procedure: EUA, LEFT PARTIAL INTERNAL SPHINCTEROTOMY;  Surgeon: Miracle Erwin MD;  Location: [address] [place] [address] MAIN OR;  Service: Colorectal    NC REPAIR UMBILICAL BVKK,9+V/A,UWBON N/A 4/24/2019    Procedure: REPAIR HERNIA UMBILICAL LAPAROSCOPIC W/ ROBOTICS;  Surgeon: Raquel Vela MD;  Location: [facility] Main [facility];  Service: General    REDUCTION MAMMAPLASTY Bilateral 1999    REPAIR LACERATION Left     left hand-5/18/2009    ROTATOR CUFF REPAIR Left     TONSILECTOMY AND ADNOIDECTOMY      TONSILLECTOMY      VEIN LIGATION Bilateral     WISDOM TOOTH EXTRACTION         Current Outpatient Medications   Medication Sig Dispense Refill    albuterol (ProAir HFA) 90 mcg/act inhaler Inhale 2 puffs every 4 (four) hours as needed for wheezing 8 5 g 1    amLODIPine (NORVASC) 5 mg tablet Take 1 tablet (5 mg total) by mouth daily 30 tablet 5    buPROPion (WELLBUTRIN XL) 300 mg 24 hr tablet       colestipol (COLESTID) 1 g tablet Take 1 tablet (1 g total) by mouth 2 (two) times a day 60 tablet 0    divalproex sodium (DEPAKOTE) 500 mg EC tablet Take 500 mg by mouth daily       estradiol (ESTRACE) 1 mg tablet Take 1 tablet (1 mg total) by mouth daily 90 tablet 3    furosemide (LASIX) 20 mg tablet TAKE 1 TABLET BY MOUTH DAILY 30 tablet 3    levothyroxine 50 mcg tablet Take 1 tablet (50 mcg total) by mouth daily in the early morning 30 tablet 5    loratadine (CLARITIN) 10 mg tablet Take 1 tablet (10 mg total) by mouth daily 30 tablet 5    losartan (COZAAR) 50 mg tablet Take 50 mg by mouth daily       lurasidone (LATUDA) 80 mg tablet Take 60 mg by mouth daily with dinner       metoprolol tartrate (LOPRESSOR) 25 mg tablet TAKE ONE TABLET BY MOUTH TWICE A DAY 60 tablet 4    Neomycin-Polymyxin-Dexameth (MAXITROL OP) Administer to both eyes as needed Eye ointment      pilocarpine (SALAGEN) 5 mg tablet Take 1 tablet (5 mg total) by mouth 2 (two) times a day 60 tablet 5    topiramate (TOPAMAX) 100 mg tablet Take 100 mg by mouth 2 (two) times a day      TOVIAZ 4 MG TB24 Take 1 tablet (4 mg total) by mouth daily  2    traZODone (DESYREL) 150 mg tablet Take 150 mg by mouth daily at bedtime as needed        No current facility-administered medications for this visit  Allergies   Allergen Reactions    Percocet [Oxycodone-Acetaminophen] Headache     Severe headaches       Review of Systems   Constitutional: Positive for appetite change and fatigue  Negative for activity change, chills and fever  HENT: Negative for congestion  Gastrointestinal: Positive for abdominal pain, diarrhea and nausea  Negative for blood in stool, constipation, rectal pain and vomiting  Genitourinary: Positive for decreased urine volume, flank pain and frequency  Negative for dysuria  Skin: Positive for rash  Video Exam    Vitals:    02/19/21 0808   Temp: 97 5 °F (36 4 °C)   TempSrc: Tympanic   Weight: 123 kg (272 lb)   Height: 5' 7" (1 702 m)       Physical Exam  Constitutional:       General: She is in acute distress  Appearance: She is obese  Abdominal:      Tenderness: There is abdominal tenderness in the left lower quadrant  There is left CVA tenderness  Neurological:      Mental Status: She is alert  Had pt check and she sees no rash        I spent 15 minutes directly with the patient during this visit      VIRTUAL VISIT DISCLAIMER    Debby Martin acknowledges that she has consented to an online visit or consultation  She understands that the online visit is based solely on information provided by her, and that, in the absence of a face-to-face physical evaluation by the physician, the diagnosis she receives is both limited and provisional in terms of accuracy and completeness  This is not intended to replace a full medical face-to-face evaluation by the physician  Debby Martin understands and accepts these terms

## 2021-02-19 NOTE — TELEPHONE ENCOUNTER
Regarding: GI problem   ----- Message from Mick Mack sent at 2/19/2021  5:15 AM EST -----  " I have a very bad belly ache that has not let me sleep the whole night not sure if it's constipation I took laxatives but it has not resolved  I'm also very nauseous   Last B M I had was over 24 hr ago "

## 2021-02-23 ENCOUNTER — ANESTHESIA EVENT (OUTPATIENT)
Dept: PERIOP | Facility: HOSPITAL | Age: 51
End: 2021-02-23
Payer: MEDICARE

## 2021-02-23 NOTE — DISCHARGE INSTRUCTIONS
Dr Scooby Gan Instructions    1  Take your prescribed medication as directed  2  Upon arrival at home, lie down and elevate your surgical foot on 2 pillows  3  Remain quiet, off your feet as much as possible, for the first 24-48 hours  This is when your feet first swell and may become painful  After 48 hours you may begin limited walking following these restrictions:   Weight bear as tolerated in CAM boot to surgical foot  4  Drink large quantities of water  Consume no alcohol  Continue a well-balanced diet  5  Report any unusual discomfort or fever to this office  6  A limited amount of discomfort and swelling is to be expected  In some cases the skin may take on a bruised appearance  The surgical solution that was applied to your foot prior to the operation is dark in color and the operation site may appear to be oozing when it actually is not  7  A slight amount of blood is to be expected, and is no cause for alarm  Do not remove the dressings  If there is active bleeding and if the bleeding persists, add additional gauze to the bandage, apply direct pressure, elevate your feet and call this office  8  Do not get the dressings wet  As regular bathing may be inconvenient, sponge baths are recommended  9  When anesthesia wears off and if any discomfort should be present, apply an ice pack directly over the operated area for 15 minute intervals for several hours or until the pain leaves  (USE IN EXCESS OF 15 MINUTES COULD CAUSE FROSTBITE)  Do not use hot water bags or electric pads  A convenient icepack can be made by placing ice cubes in a plastic bag and covering this with a towel  10  If necessary, take a mild laxative before retiring  11  Having performed the operation, we are interested in a prompt recovery  Please cooperate by following the above instructions  12  Please call to confirm your post-op appointment or call with any other questions

## 2021-02-23 NOTE — ANESTHESIA PREPROCEDURE EVALUATION
Procedure:  RECESSION GASTROC OPEN (Left Foot)    Relevant Problems   CARDIO   (+) Benign essential hypertension   (+) Hypertension   (+) Mixed hyperlipidemia   (+) Precordial pain      ENDO   (+) Hypothyroidism      GI/HEPATIC   (+) Dysphagia   (+) Hemorrhage of rectum and anus      /RENAL  passed a kidney stone this wekend  doing fine since than  has a urology appointment to follow up       MUSCULOSKELETAL   (+) Chronic low back pain   (+) DDD (degenerative disc disease), lumbar   (+) Medial epicondylitis of left elbow   (+) Primary osteoarthritis of both knees   (+) Spondylosis of lumbosacral joint without myelopathy      NEURO/PSYCH   (+) Chronic tension-type headache, not intractable   (+) Headache   (+) Major depressive disorder      PULMONARY   (+) Asthma   (+) Chronic obstructive pulmonary disease with acute exacerbation (HCC)   (+) MAG (obstructive sleep apnea)      Other   (+) Class 3 severe obesity due to excess calories with serious comorbidity and body mass index (BMI) of 40 0 to 44 9 in adult (HCC)   (+) Marijuana abuse   (+) Morbid obesity (HCC)   (+) Restrictive lung disease   (+) Schizoaffective disorder, bipolar type ([facility])   (+) Sjogren's syndrome ([facility] [address] )        Physical Exam    Airway    Mallampati score: II  TM Distance: >3 FB  Neck ROM: full     Dental       Cardiovascular  Cardiovascular exam normal    Pulmonary  Pulmonary exam normal     Other Findings        Anesthesia Plan  ASA Score- 3     Anesthesia Type- general with ASA Monitors  Additional Monitors:   Airway Plan:           Plan Factors-Exercise tolerance (METS): >4 METS  Chart reviewed  Existing labs reviewed  Patient is not a current smoker  Patient not instructed to abstain from smoking on day of procedure  Patient did not smoke on day of surgery  Induction- intravenous  Postoperative Plan- Plan for postoperative opioid use  Informed Consent- Anesthetic plan and risks discussed with patient    I personally reviewed this patient with the CRNA  Discussed and agreed on the Anesthesia Plan with the CRNA             Lab Results   Component Value Date    HGBA1C 4 9 12/18/2020       Lab Results   Component Value Date    K 3 8 02/17/2021     02/17/2021    CO2 28 02/17/2021    BUN 10 02/17/2021    CREATININE 0 64 02/17/2021    GLUF 91 02/17/2021    CALCIUM 9 2 02/17/2021    AST 13 02/17/2021    ALT 16 02/17/2021    ALKPHOS 68 02/17/2021    EGFR 105 02/17/2021       Lab Results   Component Value Date    WBC 10 22 (H) 02/17/2021    HGB 14 7 02/17/2021    HCT 46 4 (H) 02/17/2021    MCV 96 02/17/2021    PLT  02/17/2021      Comment:      Not reported due to platelet clumping

## 2021-02-24 ENCOUNTER — ANESTHESIA (OUTPATIENT)
Dept: PERIOP | Facility: HOSPITAL | Age: 51
End: 2021-02-24
Payer: MEDICARE

## 2021-02-24 ENCOUNTER — TELEPHONE (OUTPATIENT)
Dept: FAMILY MEDICINE CLINIC | Facility: CLINIC | Age: 51
End: 2021-02-24

## 2021-02-24 ENCOUNTER — HOSPITAL ENCOUNTER (OUTPATIENT)
Facility: HOSPITAL | Age: 51
Setting detail: OUTPATIENT SURGERY
Discharge: HOME | End: 2021-02-24
Attending: PODIATRIST | Admitting: PODIATRIST
Payer: MEDICARE

## 2021-02-24 VITALS
RESPIRATION RATE: 18 BRPM | OXYGEN SATURATION: 99 % | HEART RATE: 63 BPM | TEMPERATURE: 97 F | HEIGHT: 67 IN | BODY MASS INDEX: 42.69 KG/M2 | SYSTOLIC BLOOD PRESSURE: 121 MMHG | DIASTOLIC BLOOD PRESSURE: 68 MMHG | WEIGHT: 272 LBS

## 2021-02-24 VITALS — HEART RATE: 72 BPM

## 2021-02-24 DIAGNOSIS — M25.522 LEFT ELBOW PAIN: ICD-10-CM

## 2021-02-24 DIAGNOSIS — G89.18 POSTOPERATIVE PAIN: Primary | ICD-10-CM

## 2021-02-24 DIAGNOSIS — M77.02 MEDIAL EPICONDYLITIS OF LEFT ELBOW: Primary | ICD-10-CM

## 2021-02-24 RX ORDER — CEFAZOLIN SODIUM 2 G/50ML
2000 SOLUTION INTRAVENOUS ONCE
Status: COMPLETED | OUTPATIENT
Start: 2021-02-24 | End: 2021-02-24

## 2021-02-24 RX ORDER — ONDANSETRON 2 MG/ML
INJECTION INTRAMUSCULAR; INTRAVENOUS AS NEEDED
Status: DISCONTINUED | OUTPATIENT
Start: 2021-02-24 | End: 2021-02-24

## 2021-02-24 RX ORDER — CEFAZOLIN SODIUM 1 G/50ML
SOLUTION INTRAVENOUS AS NEEDED
Status: DISCONTINUED | OUTPATIENT
Start: 2021-02-24 | End: 2021-02-24

## 2021-02-24 RX ORDER — HYDROCODONE BITARTRATE AND ACETAMINOPHEN 5; 325 MG/1; MG/1
1 TABLET ORAL EVERY 6 HOURS PRN
Qty: 15 TABLET | Refills: 0 | Status: SHIPPED | OUTPATIENT
Start: 2021-02-24 | End: 2021-03-05 | Stop reason: ALTCHOICE

## 2021-02-24 RX ORDER — MAGNESIUM HYDROXIDE 1200 MG/15ML
LIQUID ORAL AS NEEDED
Status: DISCONTINUED | OUTPATIENT
Start: 2021-02-24 | End: 2021-02-24 | Stop reason: HOSPADM

## 2021-02-24 RX ORDER — KETOROLAC TROMETHAMINE 30 MG/ML
INJECTION, SOLUTION INTRAMUSCULAR; INTRAVENOUS AS NEEDED
Status: DISCONTINUED | OUTPATIENT
Start: 2021-02-24 | End: 2021-02-24

## 2021-02-24 RX ORDER — FENTANYL CITRATE 50 UG/ML
INJECTION, SOLUTION INTRAMUSCULAR; INTRAVENOUS AS NEEDED
Status: DISCONTINUED | OUTPATIENT
Start: 2021-02-24 | End: 2021-02-24

## 2021-02-24 RX ORDER — LIDOCAINE HYDROCHLORIDE AND EPINEPHRINE BITARTRATE 20; .01 MG/ML; MG/ML
INJECTION, SOLUTION SUBCUTANEOUS AS NEEDED
Status: DISCONTINUED | OUTPATIENT
Start: 2021-02-24 | End: 2021-02-24 | Stop reason: HOSPADM

## 2021-02-24 RX ORDER — ONDANSETRON 2 MG/ML
4 INJECTION INTRAMUSCULAR; INTRAVENOUS ONCE AS NEEDED
Status: DISCONTINUED | OUTPATIENT
Start: 2021-02-24 | End: 2021-02-24 | Stop reason: HOSPADM

## 2021-02-24 RX ORDER — MIDAZOLAM HYDROCHLORIDE 2 MG/2ML
INJECTION, SOLUTION INTRAMUSCULAR; INTRAVENOUS AS NEEDED
Status: DISCONTINUED | OUTPATIENT
Start: 2021-02-24 | End: 2021-02-24

## 2021-02-24 RX ORDER — PROPOFOL 10 MG/ML
INJECTION, EMULSION INTRAVENOUS AS NEEDED
Status: DISCONTINUED | OUTPATIENT
Start: 2021-02-24 | End: 2021-02-24

## 2021-02-24 RX ORDER — HYDROMORPHONE HCL/PF 1 MG/ML
0.5 SYRINGE (ML) INJECTION
Status: DISCONTINUED | OUTPATIENT
Start: 2021-02-24 | End: 2021-02-24 | Stop reason: HOSPADM

## 2021-02-24 RX ORDER — PROMETHAZINE HYDROCHLORIDE 25 MG/ML
12.5 INJECTION, SOLUTION INTRAMUSCULAR; INTRAVENOUS ONCE AS NEEDED
Status: DISCONTINUED | OUTPATIENT
Start: 2021-02-24 | End: 2021-02-24 | Stop reason: HOSPADM

## 2021-02-24 RX ORDER — HYDROCODONE BITARTRATE AND ACETAMINOPHEN 5; 325 MG/1; MG/1
1 TABLET ORAL EVERY 6 HOURS PRN
Status: DISCONTINUED | OUTPATIENT
Start: 2021-02-24 | End: 2021-02-24 | Stop reason: HOSPADM

## 2021-02-24 RX ORDER — LIDOCAINE HYDROCHLORIDE 10 MG/ML
INJECTION, SOLUTION EPIDURAL; INFILTRATION; INTRACAUDAL; PERINEURAL AS NEEDED
Status: DISCONTINUED | OUTPATIENT
Start: 2021-02-24 | End: 2021-02-24

## 2021-02-24 RX ORDER — SODIUM CHLORIDE, SODIUM LACTATE, POTASSIUM CHLORIDE, CALCIUM CHLORIDE 600; 310; 30; 20 MG/100ML; MG/100ML; MG/100ML; MG/100ML
50 INJECTION, SOLUTION INTRAVENOUS CONTINUOUS
Status: DISCONTINUED | OUTPATIENT
Start: 2021-02-24 | End: 2021-02-24 | Stop reason: HOSPADM

## 2021-02-24 RX ADMIN — HYDROMORPHONE HYDROCHLORIDE 0.5 MG: 1 INJECTION, SOLUTION INTRAMUSCULAR; INTRAVENOUS; SUBCUTANEOUS at 14:07

## 2021-02-24 RX ADMIN — FENTANYL CITRATE 50 MCG: 50 INJECTION INTRAMUSCULAR; INTRAVENOUS at 13:00

## 2021-02-24 RX ADMIN — CEFAZOLIN SODIUM 2000 MG: 2 SOLUTION INTRAVENOUS at 12:44

## 2021-02-24 RX ADMIN — PROPOFOL 200 MG: 10 INJECTION, EMULSION INTRAVENOUS at 13:00

## 2021-02-24 RX ADMIN — SODIUM CHLORIDE, SODIUM LACTATE, POTASSIUM CHLORIDE, AND CALCIUM CHLORIDE: .6; .31; .03; .02 INJECTION, SOLUTION INTRAVENOUS at 12:44

## 2021-02-24 RX ADMIN — MIDAZOLAM HYDROCHLORIDE 2 MG: 1 INJECTION, SOLUTION INTRAMUSCULAR; INTRAVENOUS at 12:55

## 2021-02-24 RX ADMIN — CEFAZOLIN SODIUM 1000 MG: 1 SOLUTION INTRAVENOUS at 12:48

## 2021-02-24 RX ADMIN — ONDANSETRON HYDROCHLORIDE 4 MG: 2 INJECTION, SOLUTION INTRAMUSCULAR; INTRAVENOUS at 13:26

## 2021-02-24 RX ADMIN — FENTANYL CITRATE 50 MCG: 50 INJECTION INTRAMUSCULAR; INTRAVENOUS at 13:20

## 2021-02-24 RX ADMIN — KETOROLAC TROMETHAMINE 30 MG: 30 INJECTION, SOLUTION INTRAMUSCULAR; INTRAVENOUS at 13:26

## 2021-02-24 RX ADMIN — LIDOCAINE HYDROCHLORIDE 50 MG: 10 INJECTION, SOLUTION EPIDURAL; INFILTRATION; INTRACAUDAL; PERINEURAL at 13:00

## 2021-02-24 NOTE — ANESTHESIA POSTPROCEDURE EVALUATION
Post-Op Assessment Note    CV Status:  Stable  Pain Score: 0    Pain management: adequate  Multimodal analgesia used between 6 hours prior to anesthesia start to PACU discharge    Mental Status:  Alert and awake   Hydration Status:  Euvolemic   PONV Controlled:  Controlled   Airway Patency:  Patent      Post Op Vitals Reviewed: Yes      Staff: CRNA         No complications documented      /87 (02/24/21 1345)    Temp 97 6 °F (36 4 °C) (02/24/21 1345)    Pulse 59 (02/24/21 1345)   Resp 12 (02/24/21 1345)    SpO2 100 % (02/24/21 1345)

## 2021-02-24 NOTE — NURSING NOTE
Received from PACU at 1435  Alert and oriented  Pain 2/10 lower left leg due to surgery  Dressing and ace wrap dry and intact  No drainage  Toes warm to touch with brisk capillary refill, sensation and movement  Foot elevated  Respirations easy and non labored  Call bell in reach  Continue to monitor

## 2021-02-24 NOTE — DISCHARGE SUMMARY
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

## 2021-02-24 NOTE — TELEPHONE ENCOUNTER
Patient requesting referral to see someone from [facility]'s  Patient is seeing a doctor with [facility] and they keep moving her appointments around and rescheduling  Please advise

## 2021-02-24 NOTE — OP NOTE
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]    Procedure(s) (LRB):  RECESSION GASTROC OPEN (Left)    Specimen(s):  * No specimens in log *    Estimated Blood Loss:   Minimal    Drains:  * No LDAs found *    Anesthesia Type:   Choice with 10 ml of 2% Lidocaine with epinephrine    Hemostasis:  Anatomic dissection  Eelctrocautery    Materials:  * No implants in log *    Operative Findings:  Consistent with diagnosis    Complications:   None    Procedure and Technique:     Under mild sedation, the patient was brought into the operating room and placed on the operating room table in the supine position  IV sedation was achieved by anesthesia team and a universal timeout was performed where all parties are in agreement of correct patient, correct procedure and correct site  A pneumatic tourniquet was then placed over the patient's left lower extremity with ample padding  A field block was performed consisting of 10 ml of 2% Lidocaine with epinephrine  The foot was then prepped and draped in the usual aseptic manner  A 3 5 cm linear incision was effected along the central aspect of calf along the myotendinous junction of the gastrocnemius  Dissection was carried down to the deep fascia small vessels were cauterized as needed  Utilizing blunt dissection a finger was placed along the deep fascia both medially and laterally  With adequate retraction a linear incision was made through the deep fascia exposing the underlying gastrocnemius aponeurosis    With  blunt dissection the surgical plane underlying the deep fascia was undermined both medially and laterally  A malleable retractor was then introduced and the aponeurosis was incised from medial to lateral  The foot was dorsiflexed and adequate reduction of the contracture was appreciated  Following irrigation the deep fascia was reapproximated with 3-0 Vicryl  Deep dermal layer reapproximated with 3-0 Vicryl  Skin was reapproximated with nylon  The skin was cleansed and dried  Adaptic, Gio ribeiro compression dressing applied  The tourniquet was not inflated during this procedure  The patient tolerated the procedure and anesthesia well without immediate complications and transferred to PACU with vital signs stable  Dr Pepper Hernández was present during the entire procedure and participated in all brush aspects  Helga Talbot DPM  DATE: February 24, 2021  TIME: 1:47 PM      Portions of the record may have been created with voice recognition software  Occasional wrong word or "sound a like" substitutions may have occurred due to the inherent limitations of voice recognition software  Read the chart carefully and recognize, using context, where substitutions have occurred

## 2021-02-25 ENCOUNTER — APPOINTMENT (OUTPATIENT)
Dept: PHYSICAL THERAPY | Facility: CLINIC | Age: 51
End: 2021-02-25
Payer: MEDICARE

## 2021-02-26 ENCOUNTER — TELEPHONE (OUTPATIENT)
Dept: OBGYN CLINIC | Facility: HOSPITAL | Age: 51
End: 2021-02-26

## 2021-02-26 NOTE — TELEPHONE ENCOUNTER
Dr Michael Steele:    Patient is calling to schedule an appointment with Dr Michael Steele at [facility] for her left elbow  Patient had surgery in 2018 at [address] [place] [address] [address] (records are in care everywhere)  Would you be willing to see this patient?       CB: [phone]

## 2021-02-26 NOTE — TELEPHONE ENCOUNTER
Patient states she received a call from the office to move forward with scheduling appt    Patient scheduled 03 17 w/ Dr Jose Guadalupe Nixon from Dr Beti Howard available via PACS

## 2021-03-03 ENCOUNTER — HOSPITAL ENCOUNTER (EMERGENCY)
Facility: HOSPITAL | Age: 51
Discharge: HOME | End: 2021-03-03
Attending: EMERGENCY MEDICINE | Admitting: EMERGENCY MEDICINE
Payer: MEDICARE

## 2021-03-03 VITALS
DIASTOLIC BLOOD PRESSURE: 89 MMHG | RESPIRATION RATE: 18 BRPM | SYSTOLIC BLOOD PRESSURE: 147 MMHG | WEIGHT: 275.57 LBS | BODY MASS INDEX: 43.16 KG/M2 | HEART RATE: 82 BPM | TEMPERATURE: 97.7 F | OXYGEN SATURATION: 98 %

## 2021-03-03 DIAGNOSIS — R11.2 NAUSEA AND VOMITING: ICD-10-CM

## 2021-03-03 DIAGNOSIS — R51.9 HEADACHE: Primary | ICD-10-CM

## 2021-03-03 LAB
ANION GAP SERPL CALCULATED.3IONS-SCNC: 8 MMOL/L (ref 4–13)
BASOPHILS # BLD AUTO: 0.04 THOUSANDS/ΜL (ref 0–0.1)
BASOPHILS NFR BLD AUTO: 0 % (ref 0–1)
BUN SERPL-MCNC: 17 MG/DL (ref 5–25)
CALCIUM SERPL-MCNC: 8.5 MG/DL (ref 8.3–10.1)
CHLORIDE SERPL-SCNC: 106 MMOL/L (ref 100–108)
CO2 SERPL-SCNC: 27 MMOL/L (ref 21–32)
CREAT SERPL-MCNC: 0.72 MG/DL (ref 0.6–1.3)
EOSINOPHIL # BLD AUTO: 0.12 THOUSAND/ΜL (ref 0–0.61)
EOSINOPHIL NFR BLD AUTO: 1 % (ref 0–6)
ERYTHROCYTE [DISTWIDTH] IN BLOOD BY AUTOMATED COUNT: 12.5 % (ref 11.6–15.1)
GFR SERPL CREATININE-BSD FRML MDRD: 99 ML/MIN/1.73SQ M
GLUCOSE SERPL-MCNC: 81 MG/DL (ref 65–140)
HCT VFR BLD AUTO: 43.4 % (ref 34.8–46.1)
HGB BLD-MCNC: 13.7 G/DL (ref 11.5–15.4)
IMM GRANULOCYTES # BLD AUTO: 0.16 THOUSAND/UL (ref 0–0.2)
IMM GRANULOCYTES NFR BLD AUTO: 2 % (ref 0–2)
LYMPHOCYTES # BLD AUTO: 3.06 THOUSANDS/ΜL (ref 0.6–4.47)
LYMPHOCYTES NFR BLD AUTO: 29 % (ref 14–44)
MCH RBC QN AUTO: 30 PG (ref 26.8–34.3)
MCHC RBC AUTO-ENTMCNC: 31.6 G/DL (ref 31.4–37.4)
MCV RBC AUTO: 95 FL (ref 82–98)
MONOCYTES # BLD AUTO: 1.26 THOUSAND/ΜL (ref 0.17–1.22)
MONOCYTES NFR BLD AUTO: 12 % (ref 4–12)
NEUTROPHILS # BLD AUTO: 5.8 THOUSANDS/ΜL (ref 1.85–7.62)
NEUTS SEG NFR BLD AUTO: 56 % (ref 43–75)
NRBC BLD AUTO-RTO: 0 /100 WBCS
PLATELET # BLD AUTO: 215 THOUSANDS/UL (ref 149–390)
PMV BLD AUTO: 10.5 FL (ref 8.9–12.7)
POTASSIUM SERPL-SCNC: 3.5 MMOL/L (ref 3.5–5.3)
RBC # BLD AUTO: 4.57 MILLION/UL (ref 3.81–5.12)
SODIUM SERPL-SCNC: 141 MMOL/L (ref 136–145)
WBC # BLD AUTO: 10.44 THOUSAND/UL (ref 4.31–10.16)

## 2021-03-03 PROCEDURE — 85025 COMPLETE CBC W/AUTO DIFF WBC: CPT | Performed by: EMERGENCY MEDICINE

## 2021-03-03 PROCEDURE — 36415 COLL VENOUS BLD VENIPUNCTURE: CPT | Performed by: EMERGENCY MEDICINE

## 2021-03-03 PROCEDURE — 96365 THER/PROPH/DIAG IV INF INIT: CPT

## 2021-03-03 PROCEDURE — 99282 EMERGENCY DEPT VISIT SF MDM: CPT | Performed by: EMERGENCY MEDICINE

## 2021-03-03 PROCEDURE — 96375 TX/PRO/DX INJ NEW DRUG ADDON: CPT

## 2021-03-03 PROCEDURE — 99284 EMERGENCY DEPT VISIT MOD MDM: CPT

## 2021-03-03 PROCEDURE — 80048 BASIC METABOLIC PNL TOTAL CA: CPT | Performed by: EMERGENCY MEDICINE

## 2021-03-03 RX ORDER — MAGNESIUM SULFATE HEPTAHYDRATE 40 MG/ML
2 INJECTION, SOLUTION INTRAVENOUS ONCE
Status: COMPLETED | OUTPATIENT
Start: 2021-03-03 | End: 2021-03-03

## 2021-03-03 RX ORDER — RABEPRAZOLE SODIUM 20 MG/1
20 TABLET, DELAYED RELEASE ORAL EVERY 12 HOURS
COMMUNITY
End: 2021-08-28

## 2021-03-03 RX ORDER — BUTALBITAL, ACETAMINOPHEN AND CAFFEINE 50; 325; 40 MG/1; MG/1; MG/1
1 TABLET ORAL EVERY 4 HOURS PRN
Qty: 10 TABLET | Refills: 0 | Status: SHIPPED | OUTPATIENT
Start: 2021-03-03 | End: 2021-03-10 | Stop reason: ALTCHOICE

## 2021-03-03 RX ORDER — KETOROLAC TROMETHAMINE 30 MG/ML
30 INJECTION, SOLUTION INTRAMUSCULAR; INTRAVENOUS ONCE
Status: COMPLETED | OUTPATIENT
Start: 2021-03-03 | End: 2021-03-03

## 2021-03-03 RX ORDER — METOCLOPRAMIDE HYDROCHLORIDE 5 MG/ML
10 INJECTION INTRAMUSCULAR; INTRAVENOUS ONCE
Status: COMPLETED | OUTPATIENT
Start: 2021-03-03 | End: 2021-03-03

## 2021-03-03 RX ORDER — UBIDECARENONE 75 MG
CAPSULE ORAL DAILY
COMMUNITY
End: 2021-03-10 | Stop reason: ALTCHOICE

## 2021-03-03 RX ORDER — DIPHENHYDRAMINE HYDROCHLORIDE 50 MG/ML
25 INJECTION INTRAMUSCULAR; INTRAVENOUS ONCE
Status: COMPLETED | OUTPATIENT
Start: 2021-03-03 | End: 2021-03-03

## 2021-03-03 RX ADMIN — METOCLOPRAMIDE HYDROCHLORIDE 10 MG: 5 INJECTION INTRAMUSCULAR; INTRAVENOUS at 07:52

## 2021-03-03 RX ADMIN — DIPHENHYDRAMINE HYDROCHLORIDE 25 MG: 50 INJECTION, SOLUTION INTRAMUSCULAR; INTRAVENOUS at 07:57

## 2021-03-03 RX ADMIN — SODIUM CHLORIDE 1000 ML: 0.9 INJECTION, SOLUTION INTRAVENOUS at 07:48

## 2021-03-03 RX ADMIN — KETOROLAC TROMETHAMINE 30 MG: 30 INJECTION, SOLUTION INTRAMUSCULAR at 07:51

## 2021-03-03 RX ADMIN — MAGNESIUM SULFATE HEPTAHYDRATE 2 G: 40 INJECTION, SOLUTION INTRAVENOUS at 07:59

## 2021-03-03 NOTE — ED PROVIDER NOTES
History  Chief Complaint   Patient presents with    Nausea     per EMS patient coming from home with increased nausea, and a headache  recent use of medical marijuana, pt reports having memory loss since COVID in january     50 y o  female presents with chief complaint of generalized throbbing headache, nausea and vomiting starting yesterday  Patient reports that since jarod and recovering from COVID-19 she has had recurrent headaches but today's headache is more significant especially coupled with the nausea and vomiting  No fevers, chills, neck pain  No shortness of breath or cough  Patient has a boot on her lower extremity following a recent podiatric surgery  History provided by:  Patient and medical records   used: No    Nausea  The primary symptoms include nausea and vomiting  Primary symptoms do not include fever, diarrhea, dysuria or rash  The illness began yesterday  The onset was gradual  The problem has been gradually worsening  The illness does not include chills  Prior to Admission Medications   Prescriptions Last Dose Informant Patient Reported? Taking?    HYDROcodone-acetaminophen (NORCO) 5-325 mg per tablet Not Taking at Unknown time  No No   Sig: Take 1 tablet by mouth every 6 (six) hours as needed for pain for up to 10 daysMax Daily Amount: 4 tablets   Patient not taking: Reported on 3/3/2021   Neomycin-Polymyxin-Dexameth (MAXITROL OP) 3/2/2021 at Unknown time  Yes Yes   Sig: Administer to both eyes as needed Eye ointment   RABEprazole (ACIPHEX) 20 MG tablet 3/2/2021 at Unknown time  Yes Yes   Sig: Take 20 mg by mouth every 12 (twelve) hours   TOVIAZ 4 MG TB24 3/2/2021 at Unknown time Self No Yes   Sig: Take 1 tablet (4 mg total) by mouth daily   albuterol (ProAir HFA) 90 mcg/act inhaler 3/2/2021 at Unknown time Self No Yes   Sig: Inhale 2 puffs every 4 (four) hours as needed for wheezing   amLODIPine (NORVASC) 5 mg tablet 3/2/2021 at Unknown time Self No Yes   Sig: Take 1 tablet (5 mg total) by mouth daily   buPROPion (WELLBUTRIN XL) 300 mg 24 hr tablet 3/2/2021 at Unknown time Self Yes Yes   colestipol (COLESTID) 1 g tablet 3/2/2021 at Unknown time Self No Yes   Sig: Take 1 tablet (1 g total) by mouth 2 (two) times a day   cyanocobalamin (VITAMIN B-12) 100 mcg tablet 3/2/2021 at Unknown time  Yes Yes   Sig: Take by mouth daily   divalproex sodium (DEPAKOTE) 500 mg EC tablet 3/2/2021 at Unknown time Self Yes Yes   Sig: Take 500 mg by mouth daily    estradiol (ESTRACE) 1 mg tablet 3/2/2021 at Unknown time Self No Yes   Sig: Take 1 tablet (1 mg total) by mouth daily   furosemide (LASIX) 20 mg tablet 3/2/2021 at Unknown time Self No Yes   Sig: TAKE 1 TABLET BY MOUTH DAILY   levothyroxine 50 mcg tablet 3/2/2021 at Unknown time Self No Yes   Sig: Take 1 tablet (50 mcg total) by mouth daily in the early morning   loratadine (CLARITIN) 10 mg tablet 3/2/2021 at Unknown time Self No Yes   Sig: Take 1 tablet (10 mg total) by mouth daily   losartan (COZAAR) 50 mg tablet 3/2/2021 at Unknown time Self Yes Yes   Sig: Take 50 mg by mouth daily    lurasidone (LATUDA) 80 mg tablet 3/2/2021 at Unknown time Self Yes Yes   Sig: Take 60 mg by mouth daily with dinner    metoprolol tartrate (LOPRESSOR) 25 mg tablet 3/2/2021 at Unknown time Self No Yes   Sig: TAKE ONE TABLET BY MOUTH TWICE A DAY   pilocarpine (SALAGEN) 5 mg tablet 3/2/2021 at Unknown time Self No Yes   Sig: Take 1 tablet (5 mg total) by mouth 2 (two) times a day   topiramate (TOPAMAX) 100 mg tablet 3/2/2021 at Unknown time Self Yes Yes   Sig: Take 100 mg by mouth 2 (two) times a day   traZODone (DESYREL) 150 mg tablet 3/2/2021 at Unknown time Self Yes Yes   Sig: Take 150 mg by mouth daily at bedtime as needed       Facility-Administered Medications: None       Past Medical History:   Diagnosis Date    Anxiety     Arthritis     oa cassandra knees    Asthma     good control- no medications    Yan's esophagus     Bipolar affective disorder ([facility] [address] [facility]     Chronic pain disorder     lumbar    CPAP (continuous positive airway pressure) dependence     Degenerative disc disease at L5-S1 level     Deliberate self-cutting     Depression 09/16/2008    Disease of thyroid gland     hypo    MARTINEZ (dyspnea on exertion)     Drug overdose 10/28/2008    suicide attempt    Dysphagia     Dyspnea     Edema     BLE    GERD (gastroesophageal reflux disease)     High blood pressure     Hypertension     Knee pain, bilateral     Especially right    Migraines     Obese     Overactive bladder     Sjogren's disease ([facility] [address] )     Sleep apnea     Stress incontinence     Suicidal ideations     Umbilical hernia     surgical repair today 4/24/2019    Use of cane as ambulatory aid     awaiting b/l knee replacement    Wears glasses        Past Surgical History:   Procedure Laterality Date    BREAST BIOPSY Right 1989    benign    CARPAL TUNNEL RELEASE Left     CHOLECYSTECTOMY  05/2003    Laparoscopic    COLONOSCOPY      01/12/2009    DILATION AND CURETTAGE OF UTERUS      ELBOW SURGERY Left     x2   No hardware    ESOPHAGOGASTRODUODENOSCOPY      FOOT SURGERY Left     Plantar fasciotomy    HYSTERECTOMY      laporoscopic, partial    KNEE ARTHROSCOPY Bilateral     OOPHORECTOMY Left 10/2015    CO ANAL SPHINCTEROTOMY N/A 8/31/2018    Procedure: EUA, LEFT PARTIAL INTERNAL SPHINCTEROTOMY;  Surgeon: Mendoza Newell MD;  Location: [address] [place] [address] OR;  Service: Colorectal    CO GASTROCNEMIUS RECESSION Left 2/24/2021    Procedure: RECESSION GASTROC OPEN;  Surgeon: Charlotte Porter DPM;  Location: [address] [place] [address] OR;  Service: [place] [id number] PSDW,1+T/Q,MKOMR N/A 4/24/2019    Procedure: REPAIR HERNIA UMBILICAL LAPAROSCOPIC W/ ROBOTICS;  Surgeon: Cherrie Chapin MD;  Location: [facility];  Service: General    REDUCTION MAMMAPLASTY Bilateral 1999    REPAIR LACERATION Left     left hand-5/18/2009    ROTATOR CUFF REPAIR Left     TONSILECTOMY AND ADNOIDECTOMY      TONSILLECTOMY      VEIN LIGATION Bilateral     WISDOM TOOTH EXTRACTION         Family History   Problem Relation Age of Onset    Kidney cancer Mother     Diabetes Mother     Colon cancer Family     No Known Problems Father     No Known Problems Sister     Colon cancer Paternal Uncle     Colon cancer Maternal Uncle     Colon cancer Maternal Uncle      I have reviewed and agree with the history as documented  E-Cigarette/Vaping    E-Cigarette Use Current Some Day User     Comments medical THC      E-Cigarette/Vaping Substances    Nicotine No     THC Yes     CBD No     Flavoring No     Other No     Unknown No      Social History     Tobacco Use    Smoking status: Former Smoker     Packs/day: 3 00     Types: Cigarettes     Quit date: 1/2/2018     Years since quitting: 3 1    Smokeless tobacco: Never Used    Tobacco comment: Started at age 14  Substance Use Topics    Alcohol use: Not Currently     Comment: Recovering alcoholic    Drug use: Yes     Types: Marijuana     Comment: medical- vapes 3 times per wk at hs       Review of Systems   Constitutional: Negative for chills, diaphoresis and fever  Respiratory: Negative for shortness of breath  Cardiovascular: Negative for chest pain and palpitations  Gastrointestinal: Positive for nausea and vomiting  Negative for diarrhea  Genitourinary: Negative for dysuria and frequency  Skin: Negative for rash  Neurological: Positive for headaches  All other systems reviewed and are negative  Physical Exam  Physical Exam  Vitals signs and nursing note reviewed  Constitutional:       General: She is in acute distress (mild)  Appearance: She is well-developed  HENT:      Head: Normocephalic and atraumatic  Eyes:      Pupils: Pupils are equal, round, and reactive to light  Neck:      Musculoskeletal: Normal range of motion  Vascular: No JVD     Cardiovascular:      Rate and Rhythm: Normal rate and regular rhythm  Heart sounds: Normal heart sounds  No murmur  No friction rub  No gallop  Pulmonary:      Effort: Pulmonary effort is normal  No respiratory distress  Breath sounds: Normal breath sounds  No wheezing or rales  Chest:      Chest wall: No tenderness  Musculoskeletal: Normal range of motion  General: No tenderness  Comments: Left foot/ankle in surgical dressing and boot  Skin:     General: Skin is warm and dry  Neurological:      General: No focal deficit present  Mental Status: She is alert and oriented to person, place, and time  Psychiatric:         Behavior: Behavior normal          Thought Content:  Thought content normal          Judgment: Judgment normal          Vital Signs  ED Triage Vitals   Temperature Pulse Respirations Blood Pressure SpO2   03/03/21 0732 03/03/21 0732 03/03/21 0732 03/03/21 0732 03/03/21 0732   97 7 °F (36 5 °C) 82 18 147/89 98 %      Temp Source Heart Rate Source Patient Position - Orthostatic VS BP Location FiO2 (%)   03/03/21 0732 03/03/21 0732 03/03/21 0732 03/03/21 0732 --   Oral Monitor Lying Right arm       Pain Score       03/03/21 0751       7           Vitals:    03/03/21 0732   BP: 147/89   Pulse: 82   Patient Position - Orthostatic VS: Lying         Visual Acuity  Visual Acuity      Most Recent Value   L Pupil Size (mm)  4   R Pupil Size (mm)  4          ED Medications  Medications   diphenhydrAMINE (BENADRYL) injection 25 mg (25 mg Intravenous Given 3/3/21 0757)   metoclopramide (REGLAN) injection 10 mg (10 mg Intravenous Given 3/3/21 0752)   ketorolac (TORADOL) injection 30 mg (30 mg Intravenous Given 3/3/21 0751)   magnesium sulfate 2 g/50 mL IVPB (premix) 2 g (0 g Intravenous Stopped 3/3/21 0920)   sodium chloride 0 9 % bolus 1,000 mL (0 mL Intravenous Stopped 3/3/21 0920)       Diagnostic Studies  Results Reviewed     Procedure Component Value Units Date/Time    Basic metabolic panel [[id number]] Collected: 03/03/21 0732 Lab Status: Final result Specimen: Blood from Arm, Right Updated: 03/03/21 0816     Sodium 141 mmol/L      Potassium 3 5 mmol/L      Chloride 106 mmol/L      CO2 27 mmol/L      ANION GAP 8 mmol/L      BUN 17 mg/dL      Creatinine 0 72 mg/dL      Glucose 81 mg/dL      Calcium 8 5 mg/dL      eGFR 99 ml/min/1 73sq m     Narrative:      [place] guidelines for Chronic Kidney Disease (CKD):     Stage 1 with normal or high GFR (GFR > 90 mL/min/1 73 square meters)    Stage 2 Mild CKD (GFR = 60-89 mL/min/1 73 square meters)    Stage 3A Moderate CKD (GFR = 45-59 mL/min/1 73 square meters)    Stage 3B Moderate CKD (GFR = 30-44 mL/min/1 73 square meters)    Stage 4 Severe CKD (GFR = 15-29 mL/min/1 73 square meters)    Stage 5 End Stage CKD (GFR <15 mL/min/1 73 square meters)  Note: GFR calculation is accurate only with a steady state creatinine    CBC and differential [[id number]]  (Abnormal) Collected: 03/03/21 0746    Lab Status: Final result Specimen: Blood from Arm, Right Updated: 03/03/21 0811     WBC 10 44 Thousand/uL      RBC 4 57 Million/uL      Hemoglobin 13 7 g/dL      Hematocrit 43 4 %      MCV 95 fL      MCH 30 0 pg      MCHC 31 6 g/dL      RDW 12 5 %      MPV 10 5 fL      Platelets 842 Thousands/uL      nRBC 0 /100 WBCs      Neutrophils Relative 56 %      Immat GRANS % 2 %      Lymphocytes Relative 29 %      Monocytes Relative 12 %      Eosinophils Relative 1 %      Basophils Relative 0 %      Neutrophils Absolute 5 80 Thousands/µL      Immature Grans Absolute 0 16 Thousand/uL      Lymphocytes Absolute 3 06 Thousands/µL      Monocytes Absolute 1 26 Thousand/µL      Eosinophils Absolute 0 12 Thousand/µL      Basophils Absolute 0 04 Thousands/µL                  No orders to display              Procedures  Procedures         ED Course  ED Course as of Mar 03 0925   Wed Mar 03, 2021   0922 Patient's headache is improved and she is no longer nauseous after the migraine cocktail    We discussed follow-up with her PCP and possibly neurology  She is agreeable with discharge planning at this time  SBIRT 22yo+      Most Recent Value   SBIRT (24 yo +)   In order to provide better care to our patients, we are screening all of our patients for alcohol and drug use  Would it be okay to ask you these screening questions? Yes Filed at: 03/03/2021 0800   Initial Alcohol Screen: US AUDIT-C    1  How often do you have a drink containing alcohol?  0 Filed at: 03/03/2021 0800   2  How many drinks containing alcohol do you have on a typical day you are drinking? 0 Filed at: 03/03/2021 0800   3a  Male UNDER 65: How often do you have five or more drinks on one occasion? 0 Filed at: 03/03/2021 0800   3b  FEMALE Any Age, or MALE 65+: How often do you have 4 or more drinks on one occassion? 0 Filed at: 03/03/2021 0800   Audit-C Score  0 Filed at: 03/03/2021 0800   OLEG: How many times in the past year have you    Used an illegal drug or used a prescription medication for non-medical reasons?   Never Filed at: 03/03/2021 0800                    MDM  Number of Diagnoses or Management Options  Headache: new and does not require workup  Nausea and vomiting: new and does not require workup  Diagnosis management comments: Background: 53 y o  female presents with headache, nausea and vomiting    Differential DX includes but is not limited to: tension headache, migraine headache, cluster headache (doubt secondary headache syndrome)    Plan: migraine cocktail and basic labs to rule out s/s dehydration          Amount and/or Complexity of Data Reviewed  Clinical lab tests: ordered    Risk of Complications, Morbidity, and/or Mortality  Presenting problems: high  Diagnostic procedures: high  Management options: high    Patient Progress  Patient progress: stable      Disposition  Final diagnoses:   Headache   Nausea and vomiting     Time reflects when diagnosis was documented in both MDM as applicable and the Disposition within this note     Time User Action Codes Description Comment    3/3/2021  9:23 AM Rhetta Dejah B Add [R51 9] Headache     3/3/2021  9:23 AM Rhetta Dejah B Add [R11 2] Nausea and vomiting       ED Disposition     ED Disposition Condition Date/Time Comment    Discharge Stable Wed Mar 3, 2021  9:23 AM Hildred Cluster discharge to home/self care  Follow-up Information     Follow up With Specialties Details Why R Guillermina Mayberry 71, CRNP Nurse Practitioner Schedule an appointment as soon as possible for a visit in 1 week  [address] [address]  [phone]            Patient's Medications   Discharge Prescriptions    BUTALBITAL-ACETAMINOPHEN-CAFFEINE (FIORICET,ESGIC) -40 MG PER TABLET    Take 1 tablet by mouth every 4 (four) hours as needed for headaches for up to 30 doses       Start Date: 3/3/2021  End Date: --       Order Dose: 1 tablet       Quantity: 10 tablet    Refills: 0     No discharge procedures on file      PDMP Review     None          ED Provider  Electronically Signed by           Pat Arroyo MD  03/03/21 0593

## 2021-03-05 ENCOUNTER — TELEPHONE (OUTPATIENT)
Dept: FAMILY MEDICINE CLINIC | Facility: CLINIC | Age: 51
End: 2021-03-05

## 2021-03-05 ENCOUNTER — OFFICE VISIT (OUTPATIENT)
Dept: FAMILY MEDICINE CLINIC | Facility: CLINIC | Age: 51
End: 2021-03-05
Payer: MEDICARE

## 2021-03-05 VITALS
WEIGHT: 279 LBS | TEMPERATURE: 97.6 F | SYSTOLIC BLOOD PRESSURE: 130 MMHG | DIASTOLIC BLOOD PRESSURE: 94 MMHG | BODY MASS INDEX: 43.7 KG/M2 | RESPIRATION RATE: 20 BRPM | OXYGEN SATURATION: 97 % | HEART RATE: 75 BPM

## 2021-03-05 DIAGNOSIS — L30.9 ECZEMA OF TIP OF FINGER: ICD-10-CM

## 2021-03-05 DIAGNOSIS — G43.019 INTRACTABLE MIGRAINE WITHOUT AURA AND WITHOUT STATUS MIGRAINOSUS: Primary | ICD-10-CM

## 2021-03-05 PROCEDURE — 99213 OFFICE O/P EST LOW 20 MIN: CPT | Performed by: NURSE PRACTITIONER

## 2021-03-05 RX ORDER — FAMOTIDINE 20 MG/1
TABLET, FILM COATED ORAL
Status: ON HOLD | COMMUNITY
Start: 2021-03-01 | End: 2021-07-06 | Stop reason: SDDI

## 2021-03-05 RX ORDER — PREDNISONE 20 MG/1
60 TABLET ORAL DAILY
COMMUNITY
Start: 2021-03-01 | End: 2021-03-05

## 2021-03-05 RX ORDER — PANTOPRAZOLE SODIUM 40 MG/1
40 TABLET, DELAYED RELEASE ORAL DAILY
Status: ON HOLD | COMMUNITY
End: 2021-07-06 | Stop reason: SDDI

## 2021-03-05 NOTE — PROGRESS NOTES
Assessment/Plan:         Diagnoses and all orders for this visit:    Intractable migraine without aura and without status migrainosus  -     Ambulatory referral to Neurology; Future    Eczema of tip of finger  -     triamcinolone (KENALOG) 0 1 % ointment; Apply topically 2 (two) times a day    Other orders  -     famotidine (PEPCID) 20 mg tablet  -     pantoprazole (PROTONIX) 40 mg tablet; pantoprazole 40 mg tablet,delayed release   TAKE 1 (ONE) TABLET TWO TIMES DAILY  -     predniSONE 20 mg tablet; Take 60 mg by mouth daily          Subjective:      Patient ID: Phil Schwarz is a 53 y o  female  HPI  Went to [place] and having headache   Feels headaches are getting worse Was given fioricet   Helps but issues with taking this due to size       Getting boot off this week    Also has ortho appt for her knee upcoming  Cracking fingers   Uses bandaides         The following portions of the patient's history were reviewed and updated as appropriate: allergies, current medications, past family history, past medical history, past social history, past surgical history and problem list     Review of Systems   Constitutional: Positive for activity change  Negative for appetite change, fatigue and fever  HENT: Negative for congestion and sore throat  Cardiovascular: Negative for chest pain  Musculoskeletal: Positive for arthralgias and gait problem  Skin: Positive for rash  Neurological: Positive for headaches  Negative for dizziness  Objective:  Vitals:    03/05/21 1533   BP: 130/94   BP Location: Left arm   Patient Position: Sitting   Cuff Size: Large   Pulse: 75   Resp: 20   Temp: 97 6 °F (36 4 °C)   TempSrc: Temporal   SpO2: 97%   Weight: 127 kg (279 lb)      Physical Exam  Vitals signs reviewed  Constitutional:       Appearance: Normal appearance  She is obese     HENT:      Right Ear: Tympanic membrane, ear canal and external ear normal       Left Ear: Tympanic membrane, ear canal and external ear normal    Eyes:      Conjunctiva/sclera: Conjunctivae normal    Cardiovascular:      Rate and Rhythm: Normal rate and regular rhythm  Heart sounds: Normal heart sounds  Pulmonary:      Effort: Pulmonary effort is normal       Breath sounds: Normal breath sounds  Skin:     General: Skin is warm  Neurological:      Mental Status: She is alert and oriented to person, place, and time

## 2021-03-09 ENCOUNTER — TELEPHONE (OUTPATIENT)
Dept: FAMILY MEDICINE CLINIC | Facility: CLINIC | Age: 51
End: 2021-03-09

## 2021-03-09 ENCOUNTER — TELEPHONE (OUTPATIENT)
Dept: NEUROLOGY | Facility: CLINIC | Age: 51
End: 2021-03-09

## 2021-03-09 NOTE — TELEPHONE ENCOUNTER
All the neurology people are in same system    I will send over a text and see if they can work on getting you in sooner    Very busy specialty and very busy

## 2021-03-09 NOTE — TELEPHONE ENCOUNTER
Spoke with Nicholas Hammonds for neurology [phone]   She will work on getting Mark Dewayne in for an appt  She will check at all their locations to see if anything is available

## 2021-03-09 NOTE — TELEPHONE ENCOUNTER
Patient called requesting if she can have another office for neurology  Patient states the two places you send her are not scheduling her until July  Patient states she can wait that long and would like to know if there is another place she can go to   Please advise

## 2021-03-09 NOTE — TELEPHONE ENCOUNTER
Patient returned call and accepted appt with Dr Tari Paul on 3/10 at 36 in [facility] office  Patient was provided with address to location and advised to check her Mychart for confirmation of time/location etc      Advised to arrive 15 mins early for paperwork  ANNE Garcias--patient is not new to Neuro--She has seen Maximo Osman and Dr Obdulia Reynoso in the past, appt changed to [facility]

## 2021-03-09 NOTE — TELEPHONE ENCOUNTER
I got tiger text back from Dr Yasmeen Walter in neuro   Requested that we call office and sates  can get her in sooner   I do not care who she sees or where      Headaches getting worse and been to ER for them

## 2021-03-10 ENCOUNTER — TELEPHONE (OUTPATIENT)
Dept: NEUROLOGY | Facility: CLINIC | Age: 51
End: 2021-03-10

## 2021-03-10 ENCOUNTER — OFFICE VISIT (OUTPATIENT)
Dept: NEUROLOGY | Facility: CLINIC | Age: 51
End: 2021-03-10
Payer: MEDICARE

## 2021-03-10 VITALS
DIASTOLIC BLOOD PRESSURE: 84 MMHG | HEART RATE: 76 BPM | WEIGHT: 282.4 LBS | BODY MASS INDEX: 44.32 KG/M2 | SYSTOLIC BLOOD PRESSURE: 169 MMHG | TEMPERATURE: 97.6 F | HEIGHT: 67 IN

## 2021-03-10 DIAGNOSIS — G44.221 CHRONIC TENSION-TYPE HEADACHE, INTRACTABLE: Primary | ICD-10-CM

## 2021-03-10 DIAGNOSIS — E55.9 VITAMIN D DEFICIENCY: ICD-10-CM

## 2021-03-10 DIAGNOSIS — G43.709 HEADACHE, CHRONIC MIGRAINE WITHOUT AURA: ICD-10-CM

## 2021-03-10 DIAGNOSIS — R41.840 POOR CONCENTRATION: ICD-10-CM

## 2021-03-10 DIAGNOSIS — Z23 ENCOUNTER FOR IMMUNIZATION: ICD-10-CM

## 2021-03-10 PROBLEM — G43.019 INTRACTABLE MIGRAINE WITHOUT AURA AND WITHOUT STATUS MIGRAINOSUS: Status: RESOLVED | Noted: 2018-11-01 | Resolved: 2021-03-10

## 2021-03-10 PROCEDURE — 99215 OFFICE O/P EST HI 40 MIN: CPT | Performed by: PSYCHIATRY & NEUROLOGY

## 2021-03-10 RX ORDER — MEMANTINE HYDROCHLORIDE 10 MG/1
TABLET ORAL
Qty: 60 TABLET | Refills: 6 | Status: SHIPPED | OUTPATIENT
Start: 2021-03-10 | End: 2021-08-28

## 2021-03-10 RX ORDER — KETOROLAC TROMETHAMINE 10 MG/1
TABLET, FILM COATED ORAL
Qty: 10 TABLET | Refills: 3 | Status: SHIPPED | OUTPATIENT
Start: 2021-03-10 | End: 2021-06-29

## 2021-03-10 NOTE — TELEPHONE ENCOUNTER
Patient left the office today without checking out  Called patient and I left a message to call back to schedule a 3 month follow up with an AP

## 2021-03-10 NOTE — PATIENT INSTRUCTIONS
Mood:   - On depakote 500mg qhs  - Topamax 100mg bid     Chronic migraine headaches without aura   Chronic tension-type headaches   Preventive therapy:   Reproductive age women: Should take folic acid daily when taking anti-seizure drugs especially Depakote   -Over-the-counter supplements: to decrease intensity and frequency of migraines  - Magnesium Oxide 400mg a day  - Vitamin B2 200 mg twice a day  -  Namenda 10 mg at bedtime for 2 weeks then 10 mg twice a day to help with her chronic migraine in to see if it helps with her memory and concentration as well  -Will submit for Botox 200 units  Abortive therapy:  -  At the onset of her headache patient is to take Toradol 10 mg with food  Patient unable to take triptans due to elevated blood pressure which is not well controlled  -     Work up:   - lab:  B12, vitamin-D    Headache management instructions  - When patient has a moderate to severe headache, they should seek rest, initiate relaxation and apply cold compresses to the head  - Maintain regular sleep schedule  Adults need at least 7-8 hours of uninterrupted a night  - Limit over the counter medications such as Tylenol, Ibuprofen, Aleve, Excedrin  (No more than 2- 3 times a week or max 10 a month)  - Maintain headache diary  Free YAIR for a smart phone, which can be used is "Migraine main"  - Limit caffeine to 1-2 cups 8 to 16 oz a day or less  - Avoid dietary trigger  (aged cheese, peanuts, MSG, aspartame and nitrates)  - Patient is to have regular frequent meals to prevent headache onset  - Please drink at least 64 ounces of water a day to help remain hydrated  Exercising for migraineurs:  Regular exercise can reduce the frequency and intensity of headaches and migraines  When one exercises, the body releases endorphins, which are the bodys natural painkillers  Exercise reduces stress and helps individuals to sleep at night   Exercising at least 30 to 40 minutes 3 times a week is sufficient for most patients  When exercising, follow this plan to prevent headaches:  - First, stay hydrated before, during, and after exercise  - Second part of the exercise plan is to eat sufficient food about an hour and a half before you exercise  Exercise causes ones blood sugar level to decrease, and it is important to have a source of energy    - Final part of the exercise plan is to warm-up  Do not jump into sudden, vigorous exercise if that triggers a headache or migraine  To read more go to https://americanmigrainefoundation  org/resource-library/effects-of-exercise-on-headaches-and-migraines/          Please call with any questions or concerns   Office number is [phone]

## 2021-03-10 NOTE — PROGRESS NOTES
Review of Systems   Constitutional: Negative  HENT: Negative  Eyes: Negative  Respiratory: Negative  Cardiovascular: Negative  Gastrointestinal: Positive for nausea  Endocrine: Negative  Genitourinary: Negative  Musculoskeletal: Negative  Skin: Negative  Allergic/Immunologic: Negative  Neurological: Positive for headaches  Hematological: Negative  Psychiatric/Behavioral: Positive for confusion
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
## 2021-03-11 ENCOUNTER — TELEPHONE (OUTPATIENT)
Dept: NEUROLOGY | Facility: CLINIC | Age: 51
End: 2021-03-11

## 2021-03-11 NOTE — TELEPHONE ENCOUNTER
----- Message from Lindsay Oliva MD sent at 3/10/2021 12:33 PM EST -----    Need Botox 200 units for patient's chronic migraine      Thank you

## 2021-03-11 NOTE — TELEPHONE ENCOUNTER
Botox authorization submitted to [employer] on 3/11/2021  Will await an approval/letter

## 2021-03-15 NOTE — TELEPHONE ENCOUNTER
Called [facility] prior authorization department- spoke with Avinash Contreras- I advised her I was calling to check on the status of the patient's Botox authorization  She advised me that the patient has Medicare as primary insurance and [place] [employer] is secondary  She advised me that the authorization has been canceled since Medicare is primary  Will need to submit to Medicare and [facility] will  the remainder

## 2021-03-15 NOTE — TELEPHONE ENCOUNTER
Called patient- lmom awaiting a call back  I wanted to make sure that the patient is aware that she has Medicare as primary insurance because it is not built into our system like that  When patient confirms this information will build Medicare into registration as primary insurance

## 2021-03-16 NOTE — TELEPHONE ENCOUNTER
Retrieved voicemail from patient returning Marsha's call  Called, spoke with patient, advised that we call [facility] and were informed that [address] [place] [address] is her secondary insurance and Medicare is her primary  Advised patient that Medicare typically cover's 80% of botox and patient would be responsible for 20% approximately $285  Advised patient that since she has secondary insurance through [address] [place] [address] they would cover the remaining 20%  Patient verbalized understanding  Advised patient that next step will be to schedule her injection  I will have Barbara SWANSON reach out to patient to assist in scheduling  Patient verbalized understanding and will await a call from our office to schedule

## 2021-03-16 NOTE — PROGRESS NOTES
PT Evaluation     Today's date: 3/17/2021  Patient name: Debby Martin  : 1970  MRN: [id number]  Referring provider: Beena Pate DPM  Dx:   Encounter Diagnosis     ICD-10-CM    1  Unspecified injury of left Achilles tendon, subsequent encounter  S86 002D    2  Plantar fasciitis of left foot  M72 2    3  Left ankle pain, unspecified chronicity  M25 572                   Assessment  Assessment details: Debby Martin is a 53 y o  female s/p surgical achilles lengthening for chonric left foot plantar fasciitis on 21  She demonstrates decreased ankle ROM, decreased foot and ankle strength, decreased balance, abnormal gait, decreased activity tolerance, decreased flexibility, and decreased function  Patient would benefit from skilled physical therapy in order to address said deficits and improve functional mobility     Impairments: abnormal gait, abnormal or restricted ROM, abnormal movement, activity intolerance, impaired balance, impaired physical strength, lacks appropriate home exercise program, pain with function, safety issue, weight-bearing intolerance, poor posture  and poor body mechanics  Understanding of Dx/Px/POC: good   Prognosis: good    Goals  STG:  Decrease pain 1 grade  Independent with HEP  Wean out of CAM boot     LTG:  Decrease pain 2 grades  Increase ankle ROM > 10 degrees  Tolerate standing > 1 hour at a time   Negotiate stairs reciprocally  Increase FOTO to expected score     Plan  Patient would benefit from: skilled physical therapy  Planned therapy interventions: manual therapy, massage, abdominal trunk stabilization, neuromuscular re-education, patient education, postural training, strengthening, activity modification, stretching, therapeutic activities, therapeutic exercise, flexibility, home exercise program and gait training  Frequency: 2x week  Duration in weeks: 6  Treatment plan discussed with: patient        Subjective Evaluation    History of Present Illness  Mechanism of injury: Patient is s/p surgery for possibly achilles lengthening on 21   She reports she had some allergy to either betadine or gauze after surgery and was sent to the ER and it was addressed  She presents in CAM boot she had been using two crutches, to one crutch, and now with [facility] today  She notes she is to begin weaning from CAM boot into sneaker  Patient reports history of surgery for plantar fasciitis in left foot years ago  She notes she recently has had 3 injections previously  and the first two really helped  She has a night splint though notes it did begin giving her pain in the top of her foot  Pain  Current pain ratin  At worst pain rating: 3    Patient Goals  Patient goal: stairs step over step,         Objective     Tenderness   Left Ankle/Foot   Tenderness in the medial calcaneus       Active Range of Motion   Left Ankle/Foot   Dorsiflexion (kf): 8 degrees   Plantar flexion: 36 degrees   Inversion: 24 degrees   Eversion: 44 degrees     Right Ankle/Foot   Dorsiflexion (kf): 12 degrees   Plantar flexion: 52 degrees   Inversion: 16 degrees   Eversion: 52 degrees     Strength/Myotome Testing     Left Ankle/Foot   Dorsiflexion: 3+  Plantar flexion: 3  Inversion: 3+  Eversion: 3+      Flowsheet Rows      Most Recent Value   PT/OT G-Codes   Current Score  49   Projected Score  59             Precautions: Depression, anxiety      Manuals 3/16            laser             graston             Manual STM NV            Posterior talar mobes  NV            Manual stretching  NV            Neuro Re-Ed             SLS   NV          steamboats                                                                              Ther Ex             Bike  NV            Seated heel/toe raises NV            Ankle pumps  20x            Towel calf stretch  20" x3   sb         Soleus S NV   SB         Towel plantar fascia stretch  20" x3             Towel curls  NV            Side stepping Ther Activity             Weight shifting side/sdie, front back               biodex LOS             BAPS board              Ankle alphabet  NV                         Modalities             CP PRN

## 2021-03-17 ENCOUNTER — OFFICE VISIT (OUTPATIENT)
Dept: PHYSICAL THERAPY | Facility: CLINIC | Age: 51
End: 2021-03-17
Payer: MEDICARE

## 2021-03-17 ENCOUNTER — OFFICE VISIT (OUTPATIENT)
Dept: OBGYN CLINIC | Facility: CLINIC | Age: 51
End: 2021-03-17
Payer: COMMERCIAL

## 2021-03-17 ENCOUNTER — TRANSCRIBE ORDERS (OUTPATIENT)
Dept: PHYSICAL THERAPY | Facility: CLINIC | Age: 51
End: 2021-03-17

## 2021-03-17 VITALS
WEIGHT: 282 LBS | HEART RATE: 86 BPM | HEIGHT: 67 IN | BODY MASS INDEX: 44.26 KG/M2 | DIASTOLIC BLOOD PRESSURE: 100 MMHG | SYSTOLIC BLOOD PRESSURE: 153 MMHG

## 2021-03-17 DIAGNOSIS — M25.522 LEFT ELBOW PAIN: ICD-10-CM

## 2021-03-17 DIAGNOSIS — S86.002D UNSPECIFIED INJURY OF LEFT ACHILLES TENDON, SUBSEQUENT ENCOUNTER: Primary | ICD-10-CM

## 2021-03-17 DIAGNOSIS — M72.2 PLANTAR FASCIITIS OF LEFT FOOT: ICD-10-CM

## 2021-03-17 DIAGNOSIS — M25.572 LEFT ANKLE PAIN, UNSPECIFIED CHRONICITY: ICD-10-CM

## 2021-03-17 DIAGNOSIS — M77.02 MEDIAL EPICONDYLITIS OF LEFT ELBOW: ICD-10-CM

## 2021-03-17 DIAGNOSIS — G56.22 NEURITIS OF LEFT ULNAR NERVE: Primary | ICD-10-CM

## 2021-03-17 PROCEDURE — 97161 PT EVAL LOW COMPLEX 20 MIN: CPT | Performed by: PHYSICAL THERAPIST

## 2021-03-17 PROCEDURE — 97110 THERAPEUTIC EXERCISES: CPT | Performed by: PHYSICAL THERAPIST

## 2021-03-17 PROCEDURE — 99244 OFF/OP CNSLTJ NEW/EST MOD 40: CPT | Performed by: SURGERY

## 2021-03-17 NOTE — TELEPHONE ENCOUNTER
Rubén Pass,    Please reach out to the patient and schedule a New Start Botox appointment with Alessandra Hannah  It will be our stock   Please let me know once the patient has been scheduled so I can attach a referral     Thank you,    Yesenia Ovalles

## 2021-03-17 NOTE — PROGRESS NOTES
Mau MARINO  Attending, Orthopaedic Surgery  Hand, Wrist, and Elbow Surgery  [facility]      ORTHOPAEDIC HAND, WRIST, AND ELBOW OFFICE  VISIT       ASSESSMENT/PLAN:      53year old female here for her left medial epicondylitis and ulnar neuritis  At this time it's recommended to have the ulnar nerve further evaluated for compression with an US, at the same time an injection can be done  A Cock up wrist brace to be used at night to give the flexor mass to rest  Refer to OT to work on medial epicondylitis and ulnar neuritis with nerve glides  Will follow-up with the patient 8 weeks for further evaluation to see how the injection affected her symptoms  The patient verbalized understanding of exam findings and treatment plan  We engaged in the shared decision-making process and treatment options were discussed at length with the patient  Surgical and conservative management discussed today along with risks and benefits  Diagnoses and all orders for this visit:    Neuritis of left ulnar nerve  -     Ambulatory referral to PT/OT hand therapy; Future    Medial epicondylitis of left elbow  -     Ambulatory referral to Orthopedic Surgery  -     Cock Up Wrist Splint  -     Ambulatory referral to PT/OT hand therapy; Future    Left elbow pain  -     Ambulatory referral to Orthopedic Surgery        Follow Up:  Return in about 8 weeks (around 5/12/2021) for Left elbow  To Do Next Visit:  Re-evaluation of current issue      General Discussions:  Medial Epicondylitis: The anatomy and physiology of medial epicondylitis was discussed with the patient today  Typically, degenerative changes in the flexor pronator mass occur over time  These degenerative changes appear as tears of the tendon on MRI  This creates pain over the medial epicondyle    This pain typically is made worse with palm up lifting activities as well as anything that involves strength and stability of the wrist   The pain may radiate from the wrist up to the elbow  At times, the shoulder may be weak as well which can predispose or cause continuation of the problem  Conservative treatment usually cures a vast majority of patients; however, this may take up to 6-9 months  Conservative treatment options typically include activity modification, therapy for strengthening of the shoulder and elbow, tennis elbow strap, and possible corticosteroid injections  Corticosteroid injections are very effective at relieving pain but do not alter the natural history of this process  Rather, steroid injections decrease the pain temporarily to allow for therapy to take place without discomfort  It was discussed that therapy to prevent recurrence is a "life long" process and that if patient relies on the steroid injection alone without performing therapeutic exercises the risk of recurrence is likely  Typical home regimen includes heat,  stretching and resisted wrist flex ion and forearm rotation exercises discussed in the office  Surgery is required in fewer than 5% of patients  At times, the ulnar nerve may be aggravated with this condition  ____________________________________________________________________________________________________________________________________________      CHIEF COMPLAINT:  Chief Complaint   Patient presents with    Left Elbow - Pain       SUBJECTIVE:  Shauna Robles is a 53y o  year old RHD female who presents  Today for consultation for her left elbow pain referred by  Keyshawn Santiago  Patient has a history of a left elbow medial epicondyle and tendon debridement and repair on 04/16/2018 by Dr Pepito Ayala at [facility]  Patient states that she last seen Dr Eddie Snider in January who recommended an EMG to further evaluate the numbness and tingling in the ring and small fingers along with a course of OT  She states that she has not had the EMG or started the OT   She is currently in PT for her left foot in low tide cam boot  Pain/symptom timing:  Worse during the day when active  Pain/symptom context:  Worse with activites and work  Pain/symptom modifying factors:  Rest makes better, activities make worse  Pain/symptom associated signs/symptoms: none    Prior treatment   · NSAIDsNo   · Injections No   · Bracing/Orthotics No    Physical Therapy No     I have personally reviewed all the relevant PMH, PSH, SH, FH, Medications and allergies      PAST MEDICAL HISTORY:  Past Medical History:   Diagnosis Date    Anxiety     Arthritis     oa cassandra knees    Asthma     good control- no medications    Yan's esophagus     Bipolar affective disorder (HCC)     Chronic pain disorder     lumbar    CPAP (continuous positive airway pressure) dependence     Degenerative disc disease at L5-S1 level     Deliberate self-cutting     Depression 09/16/2008    Disease of thyroid gland     hypo    MARTINEZ (dyspnea on exertion)     Drug overdose 10/28/2008    suicide attempt    Dysphagia     Dyspnea     Edema     BLE    GERD (gastroesophageal reflux disease)     High blood pressure     Hypertension     Knee pain, bilateral     Especially right    Migraines     Obese     Overactive bladder     Sjogren's disease ([address] )     Sleep apnea     Stress incontinence     Suicidal ideations     Umbilical hernia     surgical repair today 4/24/2019    Use of cane as ambulatory aid     awaiting b/l knee replacement    Wears glasses        PAST SURGICAL HISTORY:  Past Surgical History:   Procedure Laterality Date    BREAST BIOPSY Right 1989    benign    CARPAL TUNNEL RELEASE Left     CHOLECYSTECTOMY  05/2003    Laparoscopic    COLONOSCOPY      01/12/2009    DILATION AND CURETTAGE OF UTERUS      ELBOW SURGERY Left     x2   No hardware    ESOPHAGOGASTRODUODENOSCOPY      FOOT SURGERY Left     Plantar fasciotomy    HYSTERECTOMY      laporoscopic, partial    KNEE ARTHROSCOPY Bilateral     OOPHORECTOMY Left 10/2015    [place] ANAL SPHINCTEROTOMY N/A 8/31/2018    Procedure: EUA, LEFT PARTIAL INTERNAL SPHINCTEROTOMY;  Surgeon: Panchito Silver MD;  Location: [facility] MAIN OR;  Service: Colorectal    IL GASTROCNEMIUS RECESSION Left 2/24/2021    Procedure: RECESSION GASTROC OPEN;  Surgeon: Johnathan Conway DPM;  Location: [facility] MAIN OR;  Service: [address] DUQL,4+I/K,GRMUK N/A 4/24/2019    Procedure: REPAIR HERNIA UMBILICAL LAPAROSCOPIC W/ ROBOTICS;  Surgeon: Phu Funes MD;  Location: [facility] Main OR;  Service: General    REDUCTION MAMMAPLASTY Bilateral 1999    REPAIR LACERATION Left     left hand-5/18/2009    ROTATOR CUFF REPAIR Left     TONSILECTOMY AND ADNOIDECTOMY      TONSILLECTOMY      VEIN LIGATION Bilateral     WISDOM TOOTH EXTRACTION         FAMILY HISTORY:  Family History   Problem Relation Age of Onset    Kidney cancer Mother     Diabetes Mother     Colon cancer Family     No Known Problems Father     No Known Problems Sister     Colon cancer Paternal Uncle     Colon cancer Maternal Uncle     Colon cancer Maternal Uncle        SOCIAL HISTORY:  Social History     Tobacco Use    Smoking status: Former Smoker     Packs/day: 3 00     Types: Cigarettes     Quit date: 1/2/2018     Years since quitting: 3 2    Smokeless tobacco: Never Used    Tobacco comment: Started at age 14     Substance Use Topics    Alcohol use: Not Currently     Comment: Recovering alcoholic    Drug use: Yes     Types: Marijuana     Comment: medical- vapes 3 times per wk at        MEDICATIONS:    Current Outpatient Medications:     albuterol (ProAir HFA) 90 mcg/act inhaler, Inhale 2 puffs every 4 (four) hours as needed for wheezing, Disp: 8 5 g, Rfl: 1    amLODIPine (NORVASC) 5 mg tablet, Take 1 tablet (5 mg total) by mouth daily, Disp: 30 tablet, Rfl: 5    buPROPion (WELLBUTRIN XL) 300 mg 24 hr tablet, Take 300 mg by mouth daily , Disp: , Rfl:     colestipol (COLESTID) 1 g tablet, Take 1 tablet (1 g total) by mouth 2 (two) times a day, Disp: 60 tablet, Rfl: 0    divalproex sodium (DEPAKOTE) 500 mg EC tablet, Take 500 mg by mouth daily , Disp: , Rfl:     estradiol (ESTRACE) 1 mg tablet, Take 1 tablet (1 mg total) by mouth daily, Disp: 90 tablet, Rfl: 3    famotidine (PEPCID) 20 mg tablet, , Disp: , Rfl:     furosemide (LASIX) 20 mg tablet, TAKE 1 TABLET BY MOUTH DAILY, Disp: 30 tablet, Rfl: 3    ketorolac (TORADOL) 10 mg tablet, 1 tabs at onset of migraine, can repeat X1 in 6 hours, can combine with triptan    Take with food/milk/antacid , Disp: 10 tablet, Rfl: 3    levothyroxine 50 mcg tablet, Take 1 tablet (50 mcg total) by mouth daily in the early morning, Disp: 30 tablet, Rfl: 5    loratadine (CLARITIN) 10 mg tablet, Take 1 tablet (10 mg total) by mouth daily, Disp: 30 tablet, Rfl: 5    losartan (COZAAR) 50 mg tablet, Take 50 mg by mouth daily , Disp: , Rfl:     Lurasidone HCl (Latuda) 60 MG TABS, Take 60 mg by mouth daily with dinner , Disp: , Rfl:     memantine (NAMENDA) 10 mg tablet, 1 twice a day, Disp: 60 tablet, Rfl: 6    metoprolol tartrate (LOPRESSOR) 25 mg tablet, TAKE ONE TABLET BY MOUTH TWICE A DAY, Disp: 60 tablet, Rfl: 4    Neomycin-Polymyxin-Dexameth (MAXITROL OP), Administer to both eyes as needed Eye ointment, Disp: , Rfl:     pantoprazole (PROTONIX) 40 mg tablet, Take 40 mg by mouth daily , Disp: , Rfl:     pilocarpine (SALAGEN) 5 mg tablet, Take 1 tablet (5 mg total) by mouth 2 (two) times a day, Disp: 60 tablet, Rfl: 5    RABEprazole (ACIPHEX) 20 MG tablet, Take 20 mg by mouth every 12 (twelve) hours, Disp: , Rfl:     topiramate (TOPAMAX) 100 mg tablet, Take 100 mg by mouth 2 (two) times a day, Disp: , Rfl:     TOVIAZ 4 MG TB24, Take 1 tablet (4 mg total) by mouth daily, Disp: , Rfl: 2    traZODone (DESYREL) 150 mg tablet, Take 150 mg by mouth daily at bedtime as needed , Disp: , Rfl:     triamcinolone (KENALOG) 0 1 % ointment, Apply topically 2 (two) times a day, Disp: 30 g, Rfl: 2    ALLERGIES:  Allergies   Allergen Reactions    Percocet [Oxycodone-Acetaminophen] Headache     Severe headaches    Betadine [Povidone Iodine] Rash     Unsure if betadine or gauze post surgical  Got rash on leg  REVIEW OF SYSTEMS:  Review of Systems   Constitutional: Negative for chills, fever and unexpected weight change  HENT: Negative for hearing loss, nosebleeds and sore throat  Eyes: Negative for pain, redness and visual disturbance  Respiratory: Negative for cough, shortness of breath and wheezing  Cardiovascular: Negative for chest pain, palpitations and leg swelling  Gastrointestinal: Negative for abdominal pain and nausea  Genitourinary: Negative for dyspareunia, dysuria and frequency  Skin: Negative for rash and wound  Neurological: Negative for dizziness, numbness and headaches  Psychiatric/Behavioral: Negative for decreased concentration and suicidal ideas  The patient is not nervous/anxious          VITALS:  Vitals:    03/17/21 1448   BP: 153/100   Pulse: 86       LABS:  HgA1c:   Lab Results   Component Value Date    HGBA1C 4 9 12/18/2020     BMP:   Lab Results   Component Value Date    CALCIUM 8 5 03/03/2021    K 3 5 03/03/2021    CO2 27 03/03/2021     03/03/2021    BUN 17 03/03/2021    CREATININE 0 72 03/03/2021       _____________________________________________________  PHYSICAL EXAMINATION:  General: well developed and well nourished, alert, oriented times 3 and appears comfortable  Psychiatric: Normal  HEENT: Normocephalic, Atraumatic Trachea Midline, No torticollis  Pulmonary: No audible wheezing or respiratory distress   Cardiovascular: No pitting edema, 2+ radial pulse   Skin: No masses, erythema, lacerations, fluctation, ulcerations  Neurovascular: Sensation intact to the Median Nerve, Sensation Intact to the Radial Nerve, Decreased Sensation to  the Ulnar Nerve, Motor Intact to the Median, Ulnar, Radial Nerve and Pulses Intact  Musculoskeletal: Normal, except as noted in detailed exam and in HPI  MUSCULOSKELETAL EXAMINATION:    Left elbow:   Healing lacerations noted across the wrist on the ventral aspect of the wrist  Tenderness over the  Medial flexor pronator mass  Tenderness over the ulnar nerve  Positive compression over cubital tunnel  Full composite  Opposition intact   APB 5/5   Intrinsics 4/5  Brisk capillary refill   ___________________________________________________  STUDIES REVIEWED:  I have personally reviewed MRI of the right elbow which demonstrate Findings suspicious for mild olecranon bursitis  No MR evidence of medial or lateral epicondylitis    Ligaments and tendons about the elbow are intact        PROCEDURES PERFORMED:  Procedures  No Procedures performed today    _____________________________________________________      Chelita Bangura    I,:  Carlos Hayden am acting as a scribe while in the presence of the attending physician :       I,:  Rosalba Meeks MD personally performed the services described in this documentation    as scribed in my presence :

## 2021-03-17 NOTE — LETTER
2021    Brunilda Nunez, [address]    Patient: Azar Perez   YOB: 1970   Date of Visit: 3/17/2021     Encounter Diagnosis     ICD-10-CM    1  Unspecified injury of left Achilles tendon, subsequent encounter  S86 002D    2  Plantar fasciitis of left foot  M72 2    3  Left ankle pain, unspecified chronicity  M25 572        Dear Dr Mary Ann Teresa: Thank you for your recent referral of Azar Perez  Please review the attached evaluation summary from Blanca's recent visit  Please verify that you agree with the plan of care by signing the attached order  If you have any questions or concerns, please do not hesitate to call  I sincerely appreciate the opportunity to share in the care of one of your patients and hope to have another opportunity to work with you in the near future  Sincerely,    Ramsey Osullivan, PT      Referring Provider:      I certify that I have read the below Plan of Care and certify the need for these services furnished under this plan of treatment while under my care  Brunilda Nunez DPM  [address] [address]  Via Fax: [phone]          PT Evaluation     Today's date: 3/17/2021  Patient name: Azar Perez  : 1970  MRN: [id number]  Referring provider: Dylan Pathak DPM  Dx:   Encounter Diagnosis     ICD-10-CM    1  Unspecified injury of left Achilles tendon, subsequent encounter  S86 002D    2  Plantar fasciitis of left foot  M72 2    3  Left ankle pain, unspecified chronicity  M25 572                   Assessment  Assessment details: Azar Perez is a 53 y o  female s/p surgical achilles lengthening for chonric left foot plantar fasciitis on 21  She demonstrates decreased ankle ROM, decreased foot and ankle strength, decreased balance, abnormal gait, decreased activity tolerance, decreased flexibility, and decreased function   Patient would benefit from skilled physical therapy in order to address said deficits and improve functional mobility  Impairments: abnormal gait, abnormal or restricted ROM, abnormal movement, activity intolerance, impaired balance, impaired physical strength, lacks appropriate home exercise program, pain with function, safety issue, weight-bearing intolerance, poor posture  and poor body mechanics  Understanding of Dx/Px/POC: good   Prognosis: good    Goals  STG:  Decrease pain 1 grade  Independent with HEP  Wean out of CAM boot     LTG:  Decrease pain 2 grades  Increase ankle ROM > 10 degrees  Tolerate standing > 1 hour at a time   Negotiate stairs reciprocally  Increase FOTO to expected score     Plan  Patient would benefit from: skilled physical therapy  Planned therapy interventions: manual therapy, massage, abdominal trunk stabilization, neuromuscular re-education, patient education, postural training, strengthening, activity modification, stretching, therapeutic activities, therapeutic exercise, flexibility, home exercise program and gait training  Frequency: 2x week  Duration in weeks: 6  Treatment plan discussed with: patient        Subjective Evaluation    History of Present Illness  Mechanism of injury: Patient is s/p surgery for possibly achilles lengthening on 21   She reports she had some allergy to either betadine or gauze after surgery and was sent to the ER and it was addressed  She presents in CAM boot she had been using two crutches, to one crutch, and now with [facility] today  She notes she is to begin weaning from CAM boot into sneaker  Patient reports history of surgery for plantar fasciitis in left foot years ago  She notes she recently has had 3 injections previously  and the first two really helped  She has a night splint though notes it did begin giving her pain in the top of her foot     Pain  Current pain ratin  At worst pain rating: 3    Patient Goals  Patient goal: stairs step over step,         Objective     Tenderness   Left Ankle/Foot   Tenderness in the medial calcaneus       Active Range of Motion   Left Ankle/Foot   Dorsiflexion (kf): 8 degrees   Plantar flexion: 36 degrees   Inversion: 24 degrees   Eversion: 44 degrees     Right Ankle/Foot   Dorsiflexion (kf): 12 degrees   Plantar flexion: 52 degrees   Inversion: 16 degrees   Eversion: 52 degrees     Strength/Myotome Testing     Left Ankle/Foot   Dorsiflexion: 3+  Plantar flexion: 3  Inversion: 3+  Eversion: 3+      Flowsheet Rows      Most Recent Value   PT/OT G-Codes   Current Score  49   Projected Score  59             Precautions: Depression, anxiety      Manuals 3/16            laser             graston             Manual STM NV            Posterior talar mobes  NV            Manual stretching  NV            Neuro Re-Ed             SLS   NV          steamboats                                                                              Ther Ex             Bike  NV            Seated heel/toe raises NV            Ankle pumps  20x            Towel calf stretch  20" x3   sb         Soleus S NV   SB         Towel plantar fascia stretch  20" x3             Towel curls  NV            Side stepping                                        Ther Activity             Weight shifting side/sdie, front back               biodex LOS             [facility]              Ankle alphabet  NV                         Modalities             CP PRN

## 2021-03-22 ENCOUNTER — TELEPHONE (OUTPATIENT)
Dept: NEUROLOGY | Facility: CLINIC | Age: 51
End: 2021-03-22

## 2021-03-22 NOTE — TELEPHONE ENCOUNTER
Called patient back and I scheduled her for 04/08/21 at 01:00pm in the [facility] Location with Dr Hernan Pratt

## 2021-03-22 NOTE — TELEPHONE ENCOUNTER
Aleksandar Balderas 6 minutes ago (2:03 PM)        Patient called to schedule botox  routed to [facility] to call her back     [facility] can you please return Blanca's call  Thank you

## 2021-03-22 NOTE — TELEPHONE ENCOUNTER
Patient called to schedule botox  routed to [facility] to call her back     [facility] can you please return Blanca's call  Thank you

## 2021-03-22 NOTE — TELEPHONE ENCOUNTER
Called patient and I left a message to call back to schedule a New Start Botox appointment with Jody Edwards

## 2021-03-22 NOTE — TELEPHONE ENCOUNTER
Noted thank you- a referral has been attached to the patient's upcoming appointment with Dr Sesar Bridges on 4/8/2021 in the [facility] location  Type Date User Summary Attachment   General 03/22/2021  2:38 PM Rich Kaiser care coordination  -   Note    Botox- no authorization needed   Please use our stock      Thank you,     Terrie Boas

## 2021-03-23 ENCOUNTER — TELEPHONE (OUTPATIENT)
Dept: OBGYN CLINIC | Facility: OTHER | Age: 51
End: 2021-03-23

## 2021-03-23 NOTE — TELEPHONE ENCOUNTER
Patient called in stating that the brace you want her to wear at night time makes her pain worse  She wakes up with numbness and tingling  She is not sure what you would like to do in regards to that      C/b # [id number] , can leave a message

## 2021-03-24 ENCOUNTER — OFFICE VISIT (OUTPATIENT)
Dept: PHYSICAL THERAPY | Facility: CLINIC | Age: 51
End: 2021-03-24
Payer: MEDICARE

## 2021-03-24 DIAGNOSIS — S86.002D UNSPECIFIED INJURY OF LEFT ACHILLES TENDON, SUBSEQUENT ENCOUNTER: Primary | ICD-10-CM

## 2021-03-24 DIAGNOSIS — M72.2 PLANTAR FASCIITIS OF LEFT FOOT: ICD-10-CM

## 2021-03-24 DIAGNOSIS — M25.572 LEFT ANKLE PAIN, UNSPECIFIED CHRONICITY: ICD-10-CM

## 2021-03-24 PROCEDURE — 97110 THERAPEUTIC EXERCISES: CPT

## 2021-03-24 PROCEDURE — 97140 MANUAL THERAPY 1/> REGIONS: CPT

## 2021-03-24 PROCEDURE — 97530 THERAPEUTIC ACTIVITIES: CPT

## 2021-03-24 NOTE — PROGRESS NOTES
Daily Note     Today's date: 3/24/2021  Patient name: Iain Mayorga  : 1970  MRN: [id number]  Referring provider: Sonido Up DPM  Dx:   Encounter Diagnosis     ICD-10-CM    1  Unspecified injury of left Achilles tendon, subsequent encounter  S86 002D    2  Plantar fasciitis of left foot  M72 2    3  Left ankle pain, unspecified chronicity  M25 572                   Subjective: Pt reports she has been weaning out of the boot for the past two weeks  Pt reports she has an MD appt tomorrow  Objective: See treatment diary below      Assessment: Tolerated treatment well  Patient would benefit from continued PT   Edema noted in L LE (calf and ankle) after taking the boot off; pt reports the swelling dissipates after being out of the boot for a while  No increased discomfort w/ manual stretching  Plan: Continue per plan of care        Precautions: Depression, anxiety      Manuals 3/16 3/24           laser             graston             Manual STM NV NV           Posterior talar mobes  NV NV           Manual stretching  NV 8'           Neuro Re-Ed             SLS   NV          steamboats                                                                              Ther Ex             Bike  NV nv           Seated heel/toe raises NV 20 ea           Ankle pumps  20x 20x           Towel calf stretch  20" x3 20"x3  sb         Soleus S NV   SB         Towel plantar fascia stretch  20" x3  20"x3           Towel curls  NV 2 min           Side stepping                                        Ther Activity             Weight shifting side/sdie, front back               biodex LOS             BAPS board              Ankle alphabet  NV x1                        Modalities             CP PRN

## 2021-03-25 ENCOUNTER — OFFICE VISIT (OUTPATIENT)
Dept: PHYSICAL THERAPY | Facility: CLINIC | Age: 51
End: 2021-03-25
Payer: MEDICARE

## 2021-03-25 DIAGNOSIS — S86.002D UNSPECIFIED INJURY OF LEFT ACHILLES TENDON, SUBSEQUENT ENCOUNTER: Primary | ICD-10-CM

## 2021-03-25 DIAGNOSIS — M25.572 LEFT ANKLE PAIN, UNSPECIFIED CHRONICITY: ICD-10-CM

## 2021-03-25 DIAGNOSIS — M72.2 PLANTAR FASCIITIS OF LEFT FOOT: ICD-10-CM

## 2021-03-25 PROCEDURE — 97140 MANUAL THERAPY 1/> REGIONS: CPT

## 2021-03-25 PROCEDURE — 97110 THERAPEUTIC EXERCISES: CPT

## 2021-03-25 PROCEDURE — 97530 THERAPEUTIC ACTIVITIES: CPT

## 2021-03-25 NOTE — PROGRESS NOTES
Daily Note     Today's date: 3/25/2021  Patient name: Christopher Mcmanus  : 1970  MRN: [id number]  Referring provider: Brea Mackey DPM  Dx:   Encounter Diagnosis     ICD-10-CM    1  Unspecified injury of left Achilles tendon, subsequent encounter  S86 002D    2  Plantar fasciitis of left foot  M72 2    3  Left ankle pain, unspecified chronicity  M25 572                   Subjective: Pt states she felt a "pull" in her leg/ankle when walking around in her apartment in her shoe this morning  Pt c/o increased heel/achilles discomfort, clement when weight bearing  Pt has MDV this afternoon  Objective: See treatment diary below      Assessment: Tolerated treatment fair  Patient would benefit from continued PT  Gentle STM performed around incision; tenderness noted initially, decreased following  Tolerates manual stretching well  No increased sx noted w/ TE performed  Plan: Continue per plan of care        Precautions: Depression, anxiety      Manuals 3/16 3/24 3/25          laser             graston             Manual STM NV NV 5'          Posterior talar mobes  NV NV           Manual stretching  NV 8' 8'          Neuro Re-Ed             SLS   NV          steamboats                                                                              Ther Ex             Bike  NV nv NV          Seated heel/toe raises NV 20 ea 20 ea          Ankle pumps  20x 20x 20x          Towel calf stretch  20" x3 20"x3 20"x3 sb         Soleus S NV   SB         Towel plantar fascia stretch  20" x3  20"x3 20x3"          Towel curls  NV 2 min 2 min          Side stepping                                        Ther Activity             Weight shifting side/sdie, front back               biodex LOS             BAPS board              Ankle alphabet  NV x1 x1                       Modalities             CP PRN

## 2021-03-29 ENCOUNTER — OFFICE VISIT (OUTPATIENT)
Dept: PHYSICAL THERAPY | Facility: CLINIC | Age: 51
End: 2021-03-29
Payer: MEDICARE

## 2021-03-29 DIAGNOSIS — M72.2 PLANTAR FASCIITIS OF LEFT FOOT: ICD-10-CM

## 2021-03-29 DIAGNOSIS — M25.572 LEFT ANKLE PAIN, UNSPECIFIED CHRONICITY: ICD-10-CM

## 2021-03-29 DIAGNOSIS — S86.002D UNSPECIFIED INJURY OF LEFT ACHILLES TENDON, SUBSEQUENT ENCOUNTER: Primary | ICD-10-CM

## 2021-03-29 PROCEDURE — 97110 THERAPEUTIC EXERCISES: CPT

## 2021-03-29 NOTE — PROGRESS NOTES
Daily Note     Today's date: 3/29/2021  Patient name: Wilber Cisse  : 1970  MRN: [id number]  Referring provider: Bernice Mendoza DPM  Dx:   Encounter Diagnosis     ICD-10-CM    1  Unspecified injury of left Achilles tendon, subsequent encounter  S86 002D    2  Plantar fasciitis of left foot  M72 2    3  Left ankle pain, unspecified chronicity  M25 572                   Subjective: Pt c/o continued heel pain  Pt reports MD stated he may have "lengthened the achilles too much"  Pt will be in the boot for at least 3 more weeks; sees MD on 4/15  Objective: See treatment diary below      Assessment: Tolerated treatment well  Patient would benefit from continued PT  Held on manual stretching per MD orders  Added 4 way ankle TB to program w/out increased discomfort  No heel pain noted out of boot, when sitting  Plan: Progress treament per protocol  Precautions: Depression, anxiety  Per MD (as of 3/25): "continue PT, advised strengthening only, no stretching  Advised to D/C shoes and return to boot"         Manuals 3/16 3/24 3/25 3/29         laser             graston             Manual STM NV NV 5' DC         Posterior talar mobes  NV NV  DC         Manual stretching  NV 8' 8' DC         Neuro Re-Ed             SLS   NV          steamboats                                                                              Ther Ex             Bike  NV nv NV          Seated heel/toe raises NV 20 ea 20 ea 20 ea         Ankle pumps  20x 20x 20x 20x         Towel calf stretch  20" x3 20"x3 20"x3 20"x3         Soleus S NV            Towel plantar fascia stretch  20" x3  20"x3 20x3" np         Towel curls  NV 2 min 2 min 2 min         Side stepping              4 way ankle TB    YTB 15 ea                      Ther Activity             Weight shifting side/side front back               biodex [facility] board              Ankle alphabet  NV x1 x1 x1                      Modalities             CP PRN

## 2021-03-31 ENCOUNTER — EVALUATION (OUTPATIENT)
Dept: PHYSICAL THERAPY | Facility: CLINIC | Age: 51
End: 2021-03-31
Payer: MEDICARE

## 2021-03-31 ENCOUNTER — IMMUNIZATIONS (OUTPATIENT)
Dept: FAMILY MEDICINE CLINIC | Facility: HOSPITAL | Age: 51
End: 2021-03-31

## 2021-03-31 DIAGNOSIS — M77.02 MEDIAL EPICONDYLITIS OF LEFT ELBOW: ICD-10-CM

## 2021-03-31 DIAGNOSIS — M25.572 LEFT ANKLE PAIN, UNSPECIFIED CHRONICITY: ICD-10-CM

## 2021-03-31 DIAGNOSIS — Z23 ENCOUNTER FOR IMMUNIZATION: Primary | ICD-10-CM

## 2021-03-31 DIAGNOSIS — S86.002D UNSPECIFIED INJURY OF LEFT ACHILLES TENDON, SUBSEQUENT ENCOUNTER: Primary | ICD-10-CM

## 2021-03-31 DIAGNOSIS — M72.2 PLANTAR FASCIITIS OF LEFT FOOT: ICD-10-CM

## 2021-03-31 PROCEDURE — 97140 MANUAL THERAPY 1/> REGIONS: CPT | Performed by: PHYSICAL THERAPIST

## 2021-03-31 PROCEDURE — 97110 THERAPEUTIC EXERCISES: CPT | Performed by: PHYSICAL THERAPIST

## 2021-03-31 PROCEDURE — 0001A SARS-COV-2 / COVID-19 MRNA VACCINE (PFIZER-BIONTECH) 30 MCG: CPT

## 2021-03-31 PROCEDURE — 97530 THERAPEUTIC ACTIVITIES: CPT | Performed by: PHYSICAL THERAPIST

## 2021-03-31 PROCEDURE — 91300 SARS-COV-2 / COVID-19 MRNA VACCINE (PFIZER-BIONTECH) 30 MCG: CPT

## 2021-03-31 PROCEDURE — 97164 PT RE-EVAL EST PLAN CARE: CPT | Performed by: PHYSICAL THERAPIST

## 2021-03-31 NOTE — PROGRESS NOTES
PT Evaluation     Today's date: 3/31/2021  Patient name: Roseline Louis  : 1970  MRN: [id number]  Referring provider: Minerva Figueroa DPM  Dx:   Encounter Diagnosis     ICD-10-CM    1  Unspecified injury of left Achilles tendon, subsequent encounter  S86 002D    2  Plantar fasciitis of left foot  M72 2    3  Left ankle pain, unspecified chronicity  M25 572    4  Medial epicondylitis of left elbow  M77 02                   Assessment  Assessment details: Roseline Louis is a 53 y o  female who presents with signs and symptoms of left elbow medial epicondylitis and post operative achillles lengthening? Adam Malik She had past surgical corrective procedures to medial epicondyle that have failed  She has mild decrease in UE and LE ROM, decreased UE and LE strength, decreased activity tolerance, abnormal gait, poor posture, and decreased function  Patient would benefit from skilled physical therapy in order to address said deficits and improve functional mobility     Impairments: abnormal gait, abnormal or restricted ROM, abnormal movement, activity intolerance, impaired balance, impaired physical strength, lacks appropriate home exercise program, pain with function, weight-bearing intolerance, poor posture  and poor body mechanics  Barriers to therapy:     Understanding of Dx/Px/POC: good   Prognosis: fair  Prognosis details: 2 prior failed elbow surgeries    Goals  STG:  Decrease pain 1 grade   Demonstrate proper ergonomic posture   Independent with HEP  Wean out of CAM boot     LTG:  Decrease pain 2 grades  Improve strength at least 1/2 grade   Lift 15# without difficulty   Push/pull 15# without difficulty   Increase ankle ROM > 10 degrees  Tolerate standing > 1 hour at a time   Negotiate stairs reciprocally  Increase FOTO to expected score      Plan  Patient would benefit from: skilled physical therapy  Planned modality interventions: cryotherapy, low level laser therapy and thermotherapy: hydrocollator packs  Planned therapy interventions: abdominal trunk stabilization, manual therapy, massage, joint mobilization, patient education, self care, stretching, strengthening, therapeutic activities, therapeutic exercise, flexibility, neuromuscular re-education and home exercise program  Frequency: 2x week  Duration in weeks: 6  Treatment plan discussed with: patient        Subjective Evaluation    History of Present Illness  Mechanism of injury: Patient reports flare up of elbow pain which she was treated for previously She is also being treated for left ankle achilles lengthening  History of 2 surgeries on left medial elbow (most recent 2-3 years ago)  She notes elbow pain resumed about 1-2 months ago at work due to pulling and lifting  She Is getting n/t into last 3 digits on left hand, has difficulty planing guitar due to tingling  Most pain with lifting, pulling ( she has a 15# restriction at this time)     She is right hand dominant  is getting MRI on  and supposed to follow up with MD on  scheduled for surgery for her plantar fasciitis and notes her left hip is getting sore too  Pain  Current pain ratin  At worst pain ratin  Location: n/t into last 4 fingers currently     Patient Goals  Patient goals for therapy: decreased pain  Patient goal: have no dificulty grabbing or lifting         Objective     Tenderness     Left Elbow   Tenderness in the cubital tunnel and medial epicondyle  Left Wrist/Hand   Tenderness in the medial epicondyle       Active Range of Motion     Left Elbow   Flexion: WFL  Extension: WFL  Forearm supination: 88 degrees   Forearm pronation: 118 degrees     Right Elbow   Normal active range of motion  Flexion: WFL  Extension: WFL  Forearm supination: 98 degrees   Forearm pronation: 88 degrees     Strength/Myotome Testing     Left Elbow   Flexion: 4+ (pain )  Extension: 4+  Forearm pronation: 4+ (pain)    Right Elbow   Flexion: 5  Extension: 5  Forearm supination: 5  Forearm pronation: 5    Additional Strength Details  Wrist flexion/extn 5/5 bilaterally     Right  neutral 71#  Left  neutral 67#              Precautions: Depression, anxiety  Per MD (as of 3/25): "continue PT, advised strengthening only, no stretching  Advised to D/C shoes and return to boot"         Manuals 3/31            Graston to medial elbow/flexors 8'            Laser to medial elbows  6'                                      Neuro Re-Ed             SLS             Steamboats                                                                               Ther Ex             UBE NV            Bike (when out of boot)             Seated heel/toe raises  2x10 ea             Ankle pumps              Towel curls  2'            TB side step             Wrist flexion stretch 10" x10            Wrist extension strethc 10'x10            DB wrist flexion 10x10"            DB wrist extension NV            DB sup/prone NV            DB radial dev NV            DB ulnar dev NV            Ankle TB 4 way  Red TB 2x10                                                                 Ther Activity             Putty squeezes             Putty pinches             digi flexion NV            therabar bending              therabar wringing    NV                                                                          Modalities

## 2021-04-05 ENCOUNTER — APPOINTMENT (OUTPATIENT)
Dept: PHYSICAL THERAPY | Facility: CLINIC | Age: 51
End: 2021-04-05
Payer: MEDICARE

## 2021-04-07 ENCOUNTER — OFFICE VISIT (OUTPATIENT)
Dept: PHYSICAL THERAPY | Facility: CLINIC | Age: 51
End: 2021-04-07
Payer: MEDICARE

## 2021-04-07 DIAGNOSIS — M25.572 LEFT ANKLE PAIN, UNSPECIFIED CHRONICITY: ICD-10-CM

## 2021-04-07 DIAGNOSIS — M72.2 PLANTAR FASCIITIS OF LEFT FOOT: Primary | ICD-10-CM

## 2021-04-07 DIAGNOSIS — S86.002D UNSPECIFIED INJURY OF LEFT ACHILLES TENDON, SUBSEQUENT ENCOUNTER: ICD-10-CM

## 2021-04-07 DIAGNOSIS — M77.02 MEDIAL EPICONDYLITIS OF LEFT ELBOW: ICD-10-CM

## 2021-04-07 PROCEDURE — 97140 MANUAL THERAPY 1/> REGIONS: CPT

## 2021-04-07 PROCEDURE — 97110 THERAPEUTIC EXERCISES: CPT

## 2021-04-07 NOTE — PROGRESS NOTES
Daily Note     Today's date: 2021  Patient name: Ginger Baxter  : 1970  MRN: [id number]  Referring provider: Guillermina Thompson DPM  Dx:   Encounter Diagnosis     ICD-10-CM    1  Plantar fasciitis of left foot  M72 2    2  Unspecified injury of left Achilles tendon, subsequent encounter  S86 002D    3  Left ankle pain, unspecified chronicity  M25 572    4  Medial epicondylitis of left elbow  M77 02                   Subjective: Pt states her elbow is feeling better due to wearing the brace and propping the arm in a better position at night  Pt c/o BL leg cramping at night that subsides w/ movement  Objective: See treatment diary below      Assessment: Tolerated treatment well  Patient would benefit from continued PT  No adverse effects noted w/ Laser tx  Mild TTP of wrist flexors, medial epicondyle noted  No adverse effects noted w/ LE exercises performed today  Plan: Progress treatment as tolerated  Precautions: Depression, anxiety  Per MD (as of 3/25): "continue PT, advised strengthening only, no stretching  Advised to D/C shoes and return to boot"         Manuals 3/31 4/7           Graston to medial elbow/flexors 8' 8'           Laser to medial elbows  6' 6'                                     Neuro Re-Ed             SLS             Steamboats                                                                               Ther Ex             UBE NV 2'/2'           Bike (when out of boot)             Seated heel/toe raises  2x10 ea  2x10 ea           Ankle pumps              Towel curls  2' 2'           TB side step             Wrist flexion stretch 10" x10 10x10"           Wrist extension stretch 10'x10 10x10"           DB wrist flexion 10x10" 2# x15           DB wrist extension NV 2# x15           DB sup/prone NV 2# x15           DB radial dev NV 2# x15           DB ulnar dev NV 2# x15           Ankle TB 4 way  Red TB 2x10  RTB 2x10 Ther Activity             Putty squeezes             Putty pinches             digi flexion NV NV           therabar bending              therabar wringing    NV                                                                          Modalities

## 2021-04-08 ENCOUNTER — TELEPHONE (OUTPATIENT)
Dept: FAMILY MEDICINE CLINIC | Facility: CLINIC | Age: 51
End: 2021-04-08

## 2021-04-08 DIAGNOSIS — N20.0 KIDNEY STONE: Primary | ICD-10-CM

## 2021-04-12 ENCOUNTER — OFFICE VISIT (OUTPATIENT)
Dept: PHYSICAL THERAPY | Facility: CLINIC | Age: 51
End: 2021-04-12
Payer: MEDICARE

## 2021-04-12 DIAGNOSIS — S86.002D UNSPECIFIED INJURY OF LEFT ACHILLES TENDON, SUBSEQUENT ENCOUNTER: ICD-10-CM

## 2021-04-12 DIAGNOSIS — M72.2 PLANTAR FASCIITIS OF LEFT FOOT: Primary | ICD-10-CM

## 2021-04-12 DIAGNOSIS — M77.02 MEDIAL EPICONDYLITIS OF LEFT ELBOW: ICD-10-CM

## 2021-04-12 DIAGNOSIS — M25.572 LEFT ANKLE PAIN, UNSPECIFIED CHRONICITY: ICD-10-CM

## 2021-04-12 PROCEDURE — 97110 THERAPEUTIC EXERCISES: CPT | Performed by: PHYSICAL THERAPIST

## 2021-04-12 PROCEDURE — 97140 MANUAL THERAPY 1/> REGIONS: CPT | Performed by: PHYSICAL THERAPIST

## 2021-04-12 NOTE — PROGRESS NOTES
Daily Note     Today's date: 2021  Patient name: Charly Samaniego  : 1970  MRN: [id number]  Referring provider: Radha Huber DPM  Dx:   Encounter Diagnosis     ICD-10-CM    1  Plantar fasciitis of left foot  M72 2    2  Unspecified injury of left Achilles tendon, subsequent encounter  S86 002D    3  Left ankle pain, unspecified chronicity  M25 572    4  Medial epicondylitis of left elbow  M77 02                   Subjective: Pt states she has been feeling better in her elbow and has less n/t in her arm and I thinks its because of wearing her counter force strap more  Shes notes her left heel is very painful today at the bottom when she is standing  Objective: See treatment diary below      Assessment: Tolerated treatment well  Patient would benefit from continued PT  Patient reports she has some soreness with seated heel raises in her half  She has no reported pain or tenderness with manuals today  She moves slowly through exercises with distraction today and cues to stay on task  Plan: Progress treatment as tolerated  Precautions: Depression, anxiety  Per MD (as of 3/25): "continue PT, advised strengthening only, no stretching  Advised to D/C shoes and return to boot"         Manuals 3/31 4/7 4/12          Graston to medial elbow/flexors 8' 8' 8'          Laser to medial elbows  6' 6' 6'                                    Neuro Re-Ed             SLS             Steamboats                                                                               Ther Ex             UBE NV 2'/2' 2'/2'           Bike (when out of boot)             Seated heel/toe raises  2x10 ea  2x10 ea 2x10 ea           Ankle pumps              Towel curls  2' 2'           TB side step             Wrist flexion stretch 10" x10 10x10" 10x10"           Wrist extension stretch 10'x10 10x10" 10x10"          DB wrist flexion 10x10" 2# x15 2# 2x10          DB wrist extension NV 2# x15 2# 2x10          DB sup/prone NV 2# x15 2# 2x10          DB radial dev NV 2# x15           DB ulnar dev NV 2# x15           Ankle TB 4 way  Red TB 2x10  RTB 2x10  GTB 2x10                                                              Ther Activity             Putty squeezes             Putty pinches             digi flexion NV NV           therabar bending              therabar wringing    NV                                                                          Modalities

## 2021-04-13 NOTE — NURSING NOTE
Call placed to patient to discuss upcoming appointment at [place] [address] radiology department and complete consultation with patient  Patient is having an US guided evaluation of left elbow with injection   Reviewed patient's allergies , no current anticoagulant medication present per patient, also discussed the pre and post procedure expectations  Reminded patient of location and time expected for procedure, Patient expressed understanding by verbalizing and repeating instructions  My number was also supplied to patient to call if any further questions or concerns should arise pre or post procedure

## 2021-04-14 ENCOUNTER — OFFICE VISIT (OUTPATIENT)
Dept: PHYSICAL THERAPY | Facility: CLINIC | Age: 51
End: 2021-04-14
Payer: MEDICARE

## 2021-04-14 ENCOUNTER — APPOINTMENT (OUTPATIENT)
Dept: LAB | Facility: CLINIC | Age: 51
End: 2021-04-14
Payer: MEDICARE

## 2021-04-14 DIAGNOSIS — R41.840 POOR CONCENTRATION: ICD-10-CM

## 2021-04-14 DIAGNOSIS — S86.002D UNSPECIFIED INJURY OF LEFT ACHILLES TENDON, SUBSEQUENT ENCOUNTER: ICD-10-CM

## 2021-04-14 DIAGNOSIS — M72.2 PLANTAR FASCIITIS OF LEFT FOOT: Primary | ICD-10-CM

## 2021-04-14 DIAGNOSIS — M77.02 MEDIAL EPICONDYLITIS OF LEFT ELBOW: ICD-10-CM

## 2021-04-14 DIAGNOSIS — E55.9 VITAMIN D DEFICIENCY: ICD-10-CM

## 2021-04-14 DIAGNOSIS — E55.9 VITAMIN D DEFICIENCY: Primary | ICD-10-CM

## 2021-04-14 DIAGNOSIS — M25.572 LEFT ANKLE PAIN, UNSPECIFIED CHRONICITY: ICD-10-CM

## 2021-04-14 DIAGNOSIS — N20.0 KIDNEY STONE: ICD-10-CM

## 2021-04-14 LAB
25(OH)D3 SERPL-MCNC: 8.2 NG/ML (ref 30–100)
VIT B12 SERPL-MCNC: 484 PG/ML (ref 100–900)

## 2021-04-14 PROCEDURE — 82607 VITAMIN B-12: CPT

## 2021-04-14 PROCEDURE — 97140 MANUAL THERAPY 1/> REGIONS: CPT

## 2021-04-14 PROCEDURE — 36415 COLL VENOUS BLD VENIPUNCTURE: CPT

## 2021-04-14 PROCEDURE — 82306 VITAMIN D 25 HYDROXY: CPT

## 2021-04-14 PROCEDURE — 82360 CALCULUS ASSAY QUANT: CPT

## 2021-04-14 PROCEDURE — 97110 THERAPEUTIC EXERCISES: CPT

## 2021-04-14 RX ORDER — ERGOCALCIFEROL 1.25 MG/1
50000 CAPSULE ORAL WEEKLY
Qty: 8 CAPSULE | Refills: 0 | Status: ON HOLD | OUTPATIENT
Start: 2021-04-14 | End: 2021-07-06 | Stop reason: SDDI

## 2021-04-14 NOTE — PROGRESS NOTES
Daily Note     Today's date: 2021  Patient name: Kathryn Meadows  : 1970  MRN: [id number]  Referring provider: Abhijit Alanis DPM  Dx:   Encounter Diagnosis     ICD-10-CM    1  Plantar fasciitis of left foot  M72 2    2  Unspecified injury of left Achilles tendon, subsequent encounter  S86 002D    3  Left ankle pain, unspecified chronicity  M25 572    4  Medial epicondylitis of left elbow  M77 02                   Subjective: Pt c/o increased heel pain w/ prolonged WB  Also c/o increased elbow discomfort after lifting at work yesterday  Objective: See treatment diary below      Assessment: Tolerated treatment well  Patient would benefit from continued PT  Light Graston/STM performed today; TTP of medial epicondyle persists  No adverse effects w/ Laser tx or resistive exercise for elbow/wrist   Minimal cueing needed for correct technique w/ ankle TB ex  Plan: Continue per plan of care  Add digiflex/therabar wringing nv as susana  Precautions: Depression, anxiety  Per MD (as of 3/25): "continue PT, advised strengthening only, no stretching  Advised to D/C shoes and return to boot"         Manuals 3/31 4/7 4/12 4/14         Graston to medial elbow/flexors 8' 8' 8' 6'         Laser to medial elbows  6' 6' 6' 6'                                   Neuro Re-Ed             SLS             Steamboats                                                                               Ther Ex             UBE NV 2'/2' 2'/2'  2'/2'         Bike (when out of boot)             Seated heel/toe raises  2x10 ea  2x10 ea 2x10 ea  2x10 ea         Ankle pumps              Towel curls  2' 2'           TB side step             Wrist flexion stretch 10" x10 10x10" 10x10"  10x10"         Wrist extension stretch 10'x10 10x10" 10x10" 10x10"         DB wrist flexion 10x10" 2# x15 2# 2x10 2# 2x10         DB wrist extension NV 2# x15 2# 2x10 2# 2x10         DB sup/prone NV 2# x15 2# 2x10 2# 2x10         DB radial dev NV 2# x15  2# 2x10         DB ulnar dev NV 2# x15  2# 2x10         Ankle TB 4 way  Red TB 2x10  RTB 2x10  GTB 2x10 GTB 2x10                                                             Ther Activity             Putty squeezes             Putty pinches             digi flexion NV NV  NV?         therabar bending              therabar wringing    NV                                                                          Modalities

## 2021-04-15 ENCOUNTER — TELEPHONE (OUTPATIENT)
Dept: NEUROLOGY | Facility: CLINIC | Age: 51
End: 2021-04-15

## 2021-04-15 NOTE — TELEPHONE ENCOUNTER
----- Message from Eder Nguyễn PA-C sent at 4/14/2021  3:30 PM EDT -----  Please have patient start OTC vit D3 5000 units and 50,000 units from the pharmacy weekly    Also have her start B12 sublingal 500 to 1000 mcg daily

## 2021-04-15 NOTE — TELEPHONE ENCOUNTER
Patient called back in and I made her aware  Reuben Greenwood,   Patient had to leave the day of her Botox and was unable to have the injections done  I rescheduled the injections for 05/06/21 at 11:20am in the [address] [place] [address] Location with Dr Eva Woo  Can you reattach the referral      Thank you

## 2021-04-19 ENCOUNTER — OFFICE VISIT (OUTPATIENT)
Dept: PHYSICAL THERAPY | Facility: CLINIC | Age: 51
End: 2021-04-19
Payer: MEDICARE

## 2021-04-19 DIAGNOSIS — M72.2 PLANTAR FASCIITIS OF LEFT FOOT: Primary | ICD-10-CM

## 2021-04-19 DIAGNOSIS — S86.002D UNSPECIFIED INJURY OF LEFT ACHILLES TENDON, SUBSEQUENT ENCOUNTER: ICD-10-CM

## 2021-04-19 DIAGNOSIS — M77.02 MEDIAL EPICONDYLITIS OF LEFT ELBOW: ICD-10-CM

## 2021-04-19 DIAGNOSIS — M25.572 LEFT ANKLE PAIN, UNSPECIFIED CHRONICITY: ICD-10-CM

## 2021-04-19 PROCEDURE — 97110 THERAPEUTIC EXERCISES: CPT

## 2021-04-19 PROCEDURE — 97140 MANUAL THERAPY 1/> REGIONS: CPT

## 2021-04-19 NOTE — PROGRESS NOTES
Daily Note     Today's date: 2021  Patient name: Edis Lujan  : 1970  MRN: [id number]  Referring provider: Naren Burrell DPM  Dx:   Encounter Diagnosis     ICD-10-CM    1  Plantar fasciitis of left foot  M72 2    2  Unspecified injury of left Achilles tendon, subsequent encounter  S86 002D    3  Left ankle pain, unspecified chronicity  M25 572    4  Medial epicondylitis of left elbow  M77 02                   Subjective: Pt presents to PT w/out CAM boot, stating she was told to DC slowly and progress strengthening at PT  Pt states heel pain has decreased since weaning out of the boot  Continues to avoid stairs at this time  Objective: See treatment diary below      Assessment: Tolerated treatment fair  Patient would benefit from continued PT  Added weight shifting and side stepping w/ minimal difficulty  No adverse effects noted w/ IASTM to medial epicondyle  Challenged by [facility] [employer], although no pain noted  Plan: Continue per plan of care  Precautions: Depression, anxiety  Per MD (as of 4/15): "continue strengthening, no stretching of posterior compartment  Advised pt to slowly d/c CAM boot pain permitting"        Manuals 3/31 4/7 4/12 4/14 4/19        Graston to medial elbow/flexors 8' 8' 8' 6' 6'        Laser to medial elbows  6' 6' 6' 6' 6'                                  Neuro Re-Ed             SLS             Steamboats              Weight shift front/back     10 ea        sidestepping     3 laps at table                                               Ther Ex             UBE NV 2'/2' 2'/2'  2'/2' 2'/2'        Bike (when out of boot)     NV        Seated heel/toe raises  2x10 ea  2x10 ea 2x10 ea  2x10 ea 2x10 ea        Ankle pumps              Towel curls  2' 2'           TB side step             Wrist flexion stretch 10" x10 10x10" 10x10"  10x10" 10x10"        Wrist extension stretch 10'x10 10x10" 10x10" 10x10" 10x10"        DB wrist flexion 10x10" 2# x15 2# 2x10 2# 2x10 2# 2x10        DB wrist extension NV 2# x15 2# 2x10 2# 2x10 2# 2x10        DB sup/prone NV 2# x15 2# 2x10 2# 2x10 2# 2x10        DB radial dev NV 2# x15  2# 2x10 2# 2x10        DB ulnar dev NV 2# x15  2# 2x10 2# 2x10        Ankle TB 4 way  Red TB 2x10  RTB 2x10  GTB 2x10 GTB 2x10 GTB 2x10                                                            Ther Activity             Putty squeezes             Putty pinches             digi flexion NV NV  NV? Red x30        therabar bending              therabar wringing    NV Red x20                     Stairs?                                                     Modalities

## 2021-04-20 LAB
CALCIUM OXALATE DIHYDRATE MFR STONE IR: 30 %
COLOR STONE: NORMAL
COM MFR STONE: 30 %
COMMENT-STONE3: NORMAL
COMPOSITION: NORMAL
HYDROXYAPATITE 24H ENGDIFF UR: 40 %
LABORATORY COMMENT REPORT: NORMAL
PHOTO: NORMAL
SIZE STONE: NORMAL MM
SPEC SOURCE SUBJ: NORMAL
STONE ANALYSIS-IMP: NORMAL
WT STONE: 3 MG

## 2021-04-21 ENCOUNTER — OFFICE VISIT (OUTPATIENT)
Dept: PHYSICAL THERAPY | Facility: CLINIC | Age: 51
End: 2021-04-21
Payer: MEDICARE

## 2021-04-21 ENCOUNTER — IMMUNIZATIONS (OUTPATIENT)
Dept: FAMILY MEDICINE CLINIC | Facility: HOSPITAL | Age: 51
End: 2021-04-21

## 2021-04-21 DIAGNOSIS — M77.02 MEDIAL EPICONDYLITIS OF LEFT ELBOW: ICD-10-CM

## 2021-04-21 DIAGNOSIS — M72.2 PLANTAR FASCIITIS OF LEFT FOOT: Primary | ICD-10-CM

## 2021-04-21 DIAGNOSIS — Z23 ENCOUNTER FOR IMMUNIZATION: Primary | ICD-10-CM

## 2021-04-21 DIAGNOSIS — S86.002D UNSPECIFIED INJURY OF LEFT ACHILLES TENDON, SUBSEQUENT ENCOUNTER: ICD-10-CM

## 2021-04-21 DIAGNOSIS — M25.572 LEFT ANKLE PAIN, UNSPECIFIED CHRONICITY: ICD-10-CM

## 2021-04-21 PROCEDURE — 91300 SARS-COV-2 / COVID-19 MRNA VACCINE (PFIZER-BIONTECH) 30 MCG: CPT

## 2021-04-21 PROCEDURE — 97140 MANUAL THERAPY 1/> REGIONS: CPT

## 2021-04-21 PROCEDURE — 97112 NEUROMUSCULAR REEDUCATION: CPT

## 2021-04-21 PROCEDURE — 97110 THERAPEUTIC EXERCISES: CPT

## 2021-04-21 PROCEDURE — 0002A SARS-COV-2 / COVID-19 MRNA VACCINE (PFIZER-BIONTECH) 30 MCG: CPT

## 2021-04-21 NOTE — PROGRESS NOTES
Daily Note     Today's date: 2021  Patient name: Parminder Squires  : 1970  MRN: [id number]  Referring provider: Suha Zepeda DPM  Dx:   Encounter Diagnosis     ICD-10-CM    1  Plantar fasciitis of left foot  M72 2    2  Unspecified injury of left Achilles tendon, subsequent encounter  S86 002D    3  Left ankle pain, unspecified chronicity  M25 572    4  Medial epicondylitis of left elbow  M77 02                   Subjective: Pt states her heel continues to feel better  However, notes irritation in L elbow this morning, noting numbess/tingling into the hand  Objective: See treatment diary below      Assessment: Tolerated treatment well  Patient would benefit from continued PT  Pt continues to benefit from IASTM  Tolerates addition of recumbent bike well  R knee discomfort noted w/ front/back wt shifting  Good technique displayed w/ wrist exercises  Plan: Progress treatment as tolerated  Precautions: Depression, anxiety  Per MD (as of 4/15): "continue strengthening, no stretching of posterior compartment  Advised pt to slowly d/c CAM boot pain permitting"        Manuals 3/31 4/7 4/12 4/14 4/19 4/21       Graston to medial elbow/flexors 8' 8' 8' 6' 6' 6'       Laser to medial elbows  6' 6' 6' 6' 6' 6'                                 Neuro Re-Ed             SLS             Steamboats              Weight shift front/back     10 ea 15 ea       sidestepping     3 laps at table 3 laps at table       Stand HR      x15                                 Ther Ex             UBE NV 2'/2' 2'/2'  2'/2' 2'/2' 2'/2'       Bike (when out of boot)     NV 5 min       Seated heel/toe raises  2x10 ea  2x10 ea 2x10 ea  2x10 ea 2x10 ea 2x10 w/ 5# on thigh       Ankle pumps              Towel curls  2' 2'           TB side step             Wrist flexion stretch 10" x10 10x10" 10x10"  10x10" 10x10"        Wrist extension stretch 10'x10 10x10" 10x10" 10x10" 10x10"        DB wrist flexion 10x10" 2# x15 2# 2x10 2# 2x10 2# 2x10 2# 2x10       DB wrist extension NV 2# x15 2# 2x10 2# 2x10 2# 2x10 2# 2x10       DB sup/prone NV 2# x15 2# 2x10 2# 2x10 2# 2x10 2# 2x10       DB radial dev NV 2# x15  2# 2x10 2# 2x10 2# 2x10       DB ulnar dev NV 2# x15  2# 2x10 2# 2x10 2# 2x10       Ankle TB 4 way  Red TB 2x10  RTB 2x10  GTB 2x10 GTB 2x10 GTB 2x10 NV                                                           Ther Activity             Putty squeezes             Putty pinches             digi flexion NV NV  NV? Red x30 Red x30       therabar bending              therabar wringing    NV Red x20 NV                    Stairs?                                                     Modalities

## 2021-04-22 ENCOUNTER — HOSPITAL ENCOUNTER (OUTPATIENT)
Dept: RADIOLOGY | Facility: HOSPITAL | Age: 51
Discharge: HOME | End: 2021-04-22
Attending: SURGERY
Payer: MEDICARE

## 2021-04-22 ENCOUNTER — HOSPITAL ENCOUNTER (OUTPATIENT)
Dept: RADIOLOGY | Facility: HOSPITAL | Age: 51
Discharge: HOME | End: 2021-04-22
Payer: MEDICARE

## 2021-04-22 DIAGNOSIS — G56.22 NEURITIS OF LEFT ULNAR NERVE: ICD-10-CM

## 2021-04-22 DIAGNOSIS — M25.522 LEFT ELBOW PAIN: ICD-10-CM

## 2021-04-22 DIAGNOSIS — G56.22 CUBITAL TUNNEL SYNDROME ON LEFT: ICD-10-CM

## 2021-04-22 PROCEDURE — 76882 US LMTD JT/FCL EVL NVASC XTR: CPT

## 2021-04-22 PROCEDURE — 20606 DRAIN/INJ JOINT/BURSA W/US: CPT

## 2021-04-22 RX ORDER — LIDOCAINE HYDROCHLORIDE 10 MG/ML
5 INJECTION, SOLUTION EPIDURAL; INFILTRATION; INTRACAUDAL; PERINEURAL ONCE
Status: COMPLETED | OUTPATIENT
Start: 2021-04-22 | End: 2021-04-22

## 2021-04-22 RX ORDER — METHYLPREDNISOLONE ACETATE 80 MG/ML
80 INJECTION, SUSPENSION INTRA-ARTICULAR; INTRALESIONAL; INTRAMUSCULAR; SOFT TISSUE ONCE
Status: COMPLETED | OUTPATIENT
Start: 2021-04-22 | End: 2021-04-22

## 2021-04-22 RX ADMIN — METHYLPREDNISOLONE ACETATE 40 MG: 80 INJECTION, SUSPENSION INTRA-ARTICULAR; INTRALESIONAL; INTRAMUSCULAR; SOFT TISSUE at 10:00

## 2021-04-22 RX ADMIN — LIDOCAINE HYDROCHLORIDE 3 ML: 10 INJECTION, SOLUTION EPIDURAL; INFILTRATION; INTRACAUDAL; PERINEURAL at 10:00

## 2021-04-26 ENCOUNTER — APPOINTMENT (OUTPATIENT)
Dept: PHYSICAL THERAPY | Facility: CLINIC | Age: 51
End: 2021-04-26
Payer: MEDICARE

## 2021-04-28 ENCOUNTER — TELEPHONE (OUTPATIENT)
Dept: FAMILY MEDICINE CLINIC | Facility: CLINIC | Age: 51
End: 2021-04-28

## 2021-04-28 ENCOUNTER — OFFICE VISIT (OUTPATIENT)
Dept: PHYSICAL THERAPY | Facility: CLINIC | Age: 51
End: 2021-04-28
Payer: MEDICARE

## 2021-04-28 DIAGNOSIS — M77.02 MEDIAL EPICONDYLITIS OF LEFT ELBOW: ICD-10-CM

## 2021-04-28 DIAGNOSIS — M25.572 LEFT ANKLE PAIN, UNSPECIFIED CHRONICITY: ICD-10-CM

## 2021-04-28 DIAGNOSIS — M72.2 PLANTAR FASCIITIS OF LEFT FOOT: Primary | ICD-10-CM

## 2021-04-28 DIAGNOSIS — S86.002D UNSPECIFIED INJURY OF LEFT ACHILLES TENDON, SUBSEQUENT ENCOUNTER: ICD-10-CM

## 2021-04-28 PROCEDURE — 97110 THERAPEUTIC EXERCISES: CPT

## 2021-04-28 PROCEDURE — 97140 MANUAL THERAPY 1/> REGIONS: CPT

## 2021-04-28 NOTE — TELEPHONE ENCOUNTER
I talked with pt and she agrees to see someone else    Please call and set up appt   Legs need to be checked

## 2021-04-28 NOTE — TELEPHONE ENCOUNTER
Patient called stating she has really bad swollen legs and she would like advise on it  I offer her appointment today with Sagar Eden and she refused  I also offer her tomorrow with another provider and she refused  She only wants to see you   Please advise

## 2021-04-28 NOTE — PROGRESS NOTES
Daily Note     Today's date: 2021  Patient name: Sheila Wise  : 1970  MRN: [id number]  Referring provider: Corinna Harding DPM  Dx:   Encounter Diagnosis     ICD-10-CM    1  Plantar fasciitis of left foot  M72 2    2  Unspecified injury of left Achilles tendon, subsequent encounter  S86 002D    3  Left ankle pain, unspecified chronicity  M25 572    4  Medial epicondylitis of left elbow  M77 02                   Subjective: Pt c/o increase in BL leg swelling lately; pt denies calf pain  Pt does report leg swelling is less than the other day  Regardless, advised pt to contact MD about swelling  Pt states heel pain has been less  Pt reports elbow pain following last week's injection; states pain decreased the next day  Objective: See treatment diary below      Assessment: Tolerated treatment well  Patient would benefit from continued PT  L calf girth= 43 cm; R= 42 cm  Mild tenderness noted w/ IASTM to L medial epicondyle  Pt has time constraints today (receiving an injection in R knee) so entire program not performed; resume nv  Plan: Progress treatment as tolerated  Precautions: Depression, anxiety  Per MD (as of 4/15): "continue strengthening, no stretching of posterior compartment  Advised pt to slowly d/c CAM boot pain permitting"        Manuals 3/31 4/7 4/12 4/14 4/19 4/21 4/28      Graston to medial elbow/flexors 8' 8' 8' 6' 6' 6' 8'      Laser to medial elbows  6' 6' 6' 6' 6' 6' 6'                                Neuro Re-Ed             SLS             Steamboats              Weight shift front/back     10 ea 15 ea NV      sidestepping     3 laps at table 3 laps at table NV      Stand HR      x15 NV                                Ther Ex             UBE NV 2'/2' 2'/2'  2'/2' 2'/2' 2'/2' 2'/2'      Bike (when out of boot)     NV 5 min 5 min      Seated heel/toe raises  2x10 ea  2x10 ea 2x10 ea  2x10 ea 2x10 ea 2x10 w/ 5# on thigh NV      Ankle pumps              Towel curls 2' 2'           TB side step             Wrist flexion stretch 10" x10 10x10" 10x10"  10x10" 10x10"  10x10"      Wrist extension stretch 10'x10 10x10" 10x10" 10x10" 10x10"  10x10"      DB wrist flexion 10x10" 2# x15 2# 2x10 2# 2x10 2# 2x10 2# 2x10 2# 2x10      DB wrist extension NV 2# x15 2# 2x10 2# 2x10 2# 2x10 2# 2x10 2# 2x10      DB sup/prone NV 2# x15 2# 2x10 2# 2x10 2# 2x10 2# 2x10 2# 2x10      DB radial dev NV 2# x15  2# 2x10 2# 2x10 2# 2x10 2# 2x10      DB ulnar dev NV 2# x15  2# 2x10 2# 2x10 2# 2x10 2# 2x10      Ankle TB 4 way  Red TB 2x10  RTB 2x10  GTB 2x10 GTB 2x10 GTB 2x10 NV NV                                                          Ther Activity             Putty squeezes             Putty pinches             digi flexion NV NV  NV? Red x30 Red x30 Red x30      therabar bending              therabar wringing    NV Red x20 NV Red x20                   Stairs?                                                     Modalities

## 2021-04-29 ENCOUNTER — OFFICE VISIT (OUTPATIENT)
Dept: FAMILY MEDICINE CLINIC | Facility: CLINIC | Age: 51
End: 2021-04-29
Payer: MEDICARE

## 2021-04-29 VITALS
HEART RATE: 76 BPM | SYSTOLIC BLOOD PRESSURE: 118 MMHG | BODY MASS INDEX: 43.57 KG/M2 | HEIGHT: 67 IN | OXYGEN SATURATION: 98 % | WEIGHT: 277.6 LBS | DIASTOLIC BLOOD PRESSURE: 78 MMHG | RESPIRATION RATE: 16 BRPM | TEMPERATURE: 97.2 F

## 2021-04-29 DIAGNOSIS — R60.0 EDEMA OF BOTH LOWER LEGS: Primary | ICD-10-CM

## 2021-04-29 PROCEDURE — 99213 OFFICE O/P EST LOW 20 MIN: CPT | Performed by: NURSE PRACTITIONER

## 2021-04-29 NOTE — PROGRESS NOTES
Subjective:   Chief Complaint   Patient presents with    Leg Swelling        Patient ID: Arcenio North is a 53 y o  female  Today for lower leg swelling over the last couple of days  Patient has had issues with lower leg edema in the past but the last couple of days has become more intense  States they were a little bit less swollen this morning when she got up  She does not sleep in a bed she does sleep in a recliner secondary to chronic back pain  She is not been getting her legs higher than her heart for at least 20 minutes at any point during the day  Advise to elevate legs higher than heart for at least 20 minutes 3 times a day      The following portions of the patient's history were reviewed and updated as appropriate: allergies, current medications, past family history, past medical history, past social history, past surgical history and problem list     Review of Systems   Constitutional: Negative for chills, fatigue and fever  Respiratory: Negative for cough and shortness of breath  Cardiovascular: Positive for leg swelling  Negative for chest pain and palpitations  Musculoskeletal: Positive for gait problem (uses cane)  Psychiatric/Behavioral: Negative for dysphoric mood (being treated)  The patient is not nervous/anxious (being treated)  Objective:  Vitals:    04/29/21 1356   BP: 118/78   BP Location: Left arm   Patient Position: Sitting   Cuff Size: Large   Pulse: 76   Resp: 16   Temp: (!) 97 2 °F (36 2 °C)   TempSrc: Tympanic   SpO2: 98%   Weight: 126 kg (277 lb 9 6 oz)   Height: 5' 7" (1 702 m)      Physical Exam  Vitals signs reviewed  Constitutional:       Appearance: Normal appearance  She is obese  Neck:      Musculoskeletal: Normal range of motion and neck supple  Cardiovascular:      Rate and Rhythm: Normal rate and regular rhythm  Pulses: Normal pulses  Heart sounds: Normal heart sounds     Pulmonary:      Effort: Pulmonary effort is normal  Breath sounds: Normal breath sounds  No rales  Musculoskeletal:      Right lower le+ Pitting Edema present  Left lower le+ Pitting Edema present  Lymphadenopathy:      Cervical: No cervical adenopathy  Skin:     General: Skin is warm and dry  Neurological:      Mental Status: She is alert and oriented to person, place, and time  Psychiatric:         Mood and Affect: Mood normal          Behavior: Behavior normal            Assessment/Plan:    No problem-specific Assessment & Plan notes found for this encounter         Diagnoses and all orders for this visit:    Edema of both lower legs  Comments:  Advise to elevated legs higher than heart for at least 20 minutes 3 times a day

## 2021-05-04 NOTE — NURSING NOTE
All discharge instructions reviewed with patient  Pt with no questions, concerns or complaints 
[FreeTextEntry1] : 58 y.o. P 4 postmenopausal female for  annual exam. pt feels well.. PCP is Dr. Yonathan Melissa, pt offers no complaints today

## 2021-05-05 ENCOUNTER — OFFICE VISIT (OUTPATIENT)
Dept: PHYSICAL THERAPY | Facility: CLINIC | Age: 51
End: 2021-05-05
Payer: MEDICARE

## 2021-05-05 DIAGNOSIS — M25.572 LEFT ANKLE PAIN, UNSPECIFIED CHRONICITY: ICD-10-CM

## 2021-05-05 DIAGNOSIS — M72.2 PLANTAR FASCIITIS OF LEFT FOOT: Primary | ICD-10-CM

## 2021-05-05 DIAGNOSIS — S86.002D UNSPECIFIED INJURY OF LEFT ACHILLES TENDON, SUBSEQUENT ENCOUNTER: ICD-10-CM

## 2021-05-05 DIAGNOSIS — M77.02 MEDIAL EPICONDYLITIS OF LEFT ELBOW: ICD-10-CM

## 2021-05-05 PROCEDURE — 97140 MANUAL THERAPY 1/> REGIONS: CPT

## 2021-05-05 PROCEDURE — 97112 NEUROMUSCULAR REEDUCATION: CPT

## 2021-05-05 PROCEDURE — 97110 THERAPEUTIC EXERCISES: CPT

## 2021-05-05 NOTE — PROGRESS NOTES
Daily Note     Today's date: 2021  Patient name: Ebonie Hendricks  : 1970  MRN: [id number]  Referring provider: Graham Alonso DPM  Dx:   Encounter Diagnosis     ICD-10-CM    1  Plantar fasciitis of left foot  M72 2    2  Unspecified injury of left Achilles tendon, subsequent encounter  S86 002D    3  Left ankle pain, unspecified chronicity  M25 572    4  Medial epicondylitis of left elbow  M77 02                   Subjective: Pt states her elbow has been feeling better since the injection  Pt also reports her R knee has been feeling better lately  Objective: See treatment diary below      Assessment: Tolerated treatment well  Patient would benefit from continued PT  Tolerates progression to standing HR well  No adverse effects noted w/ IASTM  Occasional cueing needed for proper technique w/ TE program        Plan: Progress treatment as tolerated  Precautions: Depression, anxiety  Per MD (as of 4/15): "continue strengthening, no stretching of posterior compartment  Advised pt to slowly d/c CAM boot pain permitting"        Manuals 3/31 4/7 4/12 4/14 4/19 4/21 4/28 5/5     Graston to medial elbow/flexors 8' 8' 8' 6' 6' 6' 8' 6'     Laser to medial elbows  6' 6' 6' 6' 6' 6' 6' 6'                               Neuro Re-Ed             SLS             Steamboats              Weight shift front/back     10 ea 15 ea NV      sidestepping     3 laps at table 3 laps at table NV 4 laps      Stand HR      x15 NV 2x10                               Ther Ex             UBE NV 2'/2' 2'/2'  2'/2' 2'/2' 2'/2' 2'/2' 2'/2'     Bike (when out of boot)     NV 5 min 5 min 5 min     Seated heel/toe raises  2x10 ea  2x10 ea 2x10 ea  2x10 ea 2x10 ea 2x10 w/ 5# on thigh NV seated TR 2x10     Ankle pumps              Towel curls  2' 2'           TB side step             Wrist flexion stretch 10" x10 10x10" 10x10"  10x10" 10x10"  10x10" 10x10"     Wrist extension stretch 10'x10 10x10" 10x10" 10x10" 10x10"  10x10" 10x10"      DB wrist flexion 10x10" 2# x15 2# 2x10 2# 2x10 2# 2x10 2# 2x10 2# 2x10 2# 2x10     DB wrist extension NV 2# x15 2# 2x10 2# 2x10 2# 2x10 2# 2x10 2# 2x10 2# 2x10     DB sup/prone NV 2# x15 2# 2x10 2# 2x10 2# 2x10 2# 2x10 2# 2x10 2# 2x10     DB radial dev NV 2# x15  2# 2x10 2# 2x10 2# 2x10 2# 2x10 2# 2x10     DB ulnar dev NV 2# x15  2# 2x10 2# 2x10 2# 2x10 2# 2x10 2# 2x10     Ankle TB 4 way  Red TB 2x10  RTB 2x10  GTB 2x10 GTB 2x10 GTB 2x10 NV NV GTB 2x10 ea                                                         Ther Activity             Putty squeezes             Putty pinches             digi flexion NV NV  NV? Red x30 Red x30 Red x30 Red x30     therabar bending              therabar wringing    NV Red x20 NV Red x20 Red x20                  Stairs?                                                     Modalities

## 2021-05-06 ENCOUNTER — PROCEDURE VISIT (OUTPATIENT)
Dept: NEUROLOGY | Facility: CLINIC | Age: 51
End: 2021-05-06

## 2021-05-06 ENCOUNTER — APPOINTMENT (OUTPATIENT)
Dept: PHYSICAL THERAPY | Facility: CLINIC | Age: 51
End: 2021-05-06
Payer: MEDICARE

## 2021-05-06 VITALS — TEMPERATURE: 97.3 F | SYSTOLIC BLOOD PRESSURE: 149 MMHG | DIASTOLIC BLOOD PRESSURE: 73 MMHG | HEART RATE: 74 BPM

## 2021-05-06 DIAGNOSIS — G43.709 CHRONIC MIGRAINE WITHOUT AURA WITHOUT STATUS MIGRAINOSUS, NOT INTRACTABLE: Primary | ICD-10-CM

## 2021-05-06 PROCEDURE — 64615 CHEMODENERV MUSC MIGRAINE: CPT | Performed by: PHYSICIAN ASSISTANT

## 2021-05-06 NOTE — PROGRESS NOTES
Universal Protocol   Consent: Verbal consent obtained  Written consent obtained  Risks and benefits: risks, benefits and alternatives were discussed  Consent given by: patient  Time out: Immediately prior to procedure a "time out" was called to verify the correct patient, procedure, equipment, support staff and site/side marked as required  Patient understanding: patient states understanding of the procedure being performed  Patient consent: the patient's understanding of the procedure matches consent given  Procedure consent: procedure consent matches procedure scheduled  Relevant documents: relevant documents present and verified  Patient identity confirmed: verbally with patient        Chemodenervation     Date/Time 5/6/2021 12:00 PM     Performed by  Rachel Swanson PA-C     Authorized by Rachel Swanson PA-C        Pre-procedure details      Prepped With: Alcohol     Anesthesia  (see MAR for exact dosages):      Anesthesia method:  None   Procedure details     Position:  Upright   Botox     Botox Type:  Type A    Brand:  Botox    mL's of Botulinum Toxin:  155    Final Concentration per CC:  200 units    Needle Gauge:  30 G 2 5 inch   Procedures     Botox Procedures: chronic headache      Indications: migraines     Injection Location      Head / Face:  L superior cervical paraspinal, R superior cervical paraspinal, L , R , L frontalis, R frontalis, L medial occipitalis, R medial occipitalis, procerus, R temporalis, L temporalis, L superior trapezius and R superior trapezius    L  injection amount:  5 unit(s)    R  injection amount:  5 unit(s)    L lateral frontalis:  5 unit(s)    R lateral frontalis:  5 unit(s)    L medial frontalis:  5 unit(s)    R medial frontalis:  5 unit(s)    L temporalis injection amount:  20 unit(s)    R temporalis injection amount:  20 unit(s)    Procerus injection amount:  5 unit(s)    L medial occipitalis injection amount:  15 unit(s)    R medial occipitalis injection amount:  15 unit(s)    L superior cervical paraspinal injection amount:  10 unit(s)    R superior cervical paraspinal injection amount:  10 unit(s)    L superior trapezius injection amount:  15 unit(s)    R superior trapezius injection amount:  15 unit(s)   Total Units     Total units used:  155    Total units discarded:  45   Post-procedure details      Chemodenervation:  Chronic migraine    Facial Nerve Location[de-identified]  Bilateral facial nerve    Patient tolerance of procedure:   Tolerated well, no immediate complications

## 2021-05-10 ENCOUNTER — OFFICE VISIT (OUTPATIENT)
Dept: PHYSICAL THERAPY | Facility: CLINIC | Age: 51
End: 2021-05-10
Payer: MEDICARE

## 2021-05-10 DIAGNOSIS — M72.2 PLANTAR FASCIITIS OF LEFT FOOT: Primary | ICD-10-CM

## 2021-05-10 DIAGNOSIS — M25.572 LEFT ANKLE PAIN, UNSPECIFIED CHRONICITY: ICD-10-CM

## 2021-05-10 DIAGNOSIS — S86.002D UNSPECIFIED INJURY OF LEFT ACHILLES TENDON, SUBSEQUENT ENCOUNTER: ICD-10-CM

## 2021-05-10 DIAGNOSIS — M77.02 MEDIAL EPICONDYLITIS OF LEFT ELBOW: ICD-10-CM

## 2021-05-10 PROCEDURE — 97140 MANUAL THERAPY 1/> REGIONS: CPT

## 2021-05-10 PROCEDURE — 97110 THERAPEUTIC EXERCISES: CPT

## 2021-05-10 PROCEDURE — 97112 NEUROMUSCULAR REEDUCATION: CPT

## 2021-05-10 NOTE — PROGRESS NOTES
Daily Note     Today's date: 5/10/2021  Patient name: Rox Coley  : 1970  MRN: [id number]  Referring provider: Lisbet Lamb DPM  Dx:   Encounter Diagnosis     ICD-10-CM    1  Plantar fasciitis of left foot  M72 2    2  Unspecified injury of left Achilles tendon, subsequent encounter  S86 002D    3  Left ankle pain, unspecified chronicity  M25 572    4  Medial epicondylitis of left elbow  M77 02                   Subjective: Pt states her elbow has been feeling better since the injection  Also reports she was recently at podiatrist who stated she was "doing well" and was "getting stronger"  Objective: See treatment diary below      Assessment: Tolerated treatment well  Patient would benefit from continued PT  Added step up and overs w/out significant difficulty  Tolerates IASTM well  Progressed to 3lbs w/ wrist and forearm exercises  Plan: Progress treatment as tolerated  Precautions: Depression, anxiety  Per MD (as of 4/15): "continue strengthening, no stretching of posterior compartment  Advised pt to slowly d/c CAM boot pain permitting"        Manuals 3/31 4/7 4/12 4/14 4/19 4/21 4/28 5/5 5/10    Graston to medial elbow/flexors 8' 8' 8' 6' 6' 6' 8' 6' 6'    Laser to medial elbows  6' 6' 6' 6' 6' 6' 6' 6' 6'                              Neuro Re-Ed             SLS             Steamboats              Weight shift front/back     10 ea 15 ea NV      sidestepping     3 laps at table 3 laps at table NV 4 laps  4laps    Stand HR      x15 NV 2x10 2x10                              Ther Ex             UBE NV 2'/2' 2'/2'  2'/2' 2'/2' 2'/2' 2'/2' 2'/2' 2'/2'    Bike (when out of boot)     NV 5 min 5 min 5 min 5 min    Seated heel/toe raises  2x10 ea  2x10 ea 2x10 ea  2x10 ea 2x10 ea 2x10 w/ 5# on thigh NV seated TR 2x10 seated TR 2x10    Ankle pumps              Towel curls  2' 2'           TB side step             Wrist flexion stretch 10" x10 10x10" 10x10"  10x10" 10x10"  10x10" 10x10" Wrist extension stretch 10'x10 10x10" 10x10" 10x10" 10x10"  10x10" 10x10"      DB wrist flexion 10x10" 2# x15 2# 2x10 2# 2x10 2# 2x10 2# 2x10 2# 2x10 2# 2x10 3# 2x10    DB wrist extension NV 2# x15 2# 2x10 2# 2x10 2# 2x10 2# 2x10 2# 2x10 2# 2x10 3# 2x10    DB sup/prone NV 2# x15 2# 2x10 2# 2x10 2# 2x10 2# 2x10 2# 2x10 2# 2x10 3# 2x10    DB radial dev NV 2# x15  2# 2x10 2# 2x10 2# 2x10 2# 2x10 2# 2x10 3# 2x10    DB ulnar dev NV 2# x15  2# 2x10 2# 2x10 2# 2x10 2# 2x10 2# 2x10 3# 2x10    Ankle TB 4 way  Red TB 2x10  RTB 2x10  GTB 2x10 GTB 2x10 GTB 2x10 NV NV GTB 2x10 ea NV                                                        Ther Activity             Putty squeezes             Putty pinches             digi flexion NV NV  NV?  Red x30 Red x30 Red x30 Red x30 Red 30x    therabar bending              therabar wringing    NV Red x20 NV Red x20 Red x20 Red x20                 Step up and overs         4" x10                                           Modalities

## 2021-05-11 ENCOUNTER — TELEPHONE (OUTPATIENT)
Dept: OTHER | Facility: OTHER | Age: 51
End: 2021-05-11

## 2021-05-12 ENCOUNTER — OFFICE VISIT (OUTPATIENT)
Dept: OBGYN CLINIC | Facility: CLINIC | Age: 51
End: 2021-05-12
Payer: MEDICARE

## 2021-05-12 ENCOUNTER — TELEPHONE (OUTPATIENT)
Dept: NEUROLOGY | Facility: CLINIC | Age: 51
End: 2021-05-12

## 2021-05-12 VITALS
SYSTOLIC BLOOD PRESSURE: 138 MMHG | DIASTOLIC BLOOD PRESSURE: 84 MMHG | WEIGHT: 275.8 LBS | HEART RATE: 68 BPM | BODY MASS INDEX: 43.29 KG/M2 | HEIGHT: 67 IN

## 2021-05-12 DIAGNOSIS — G56.22 CUBITAL TUNNEL SYNDROME ON LEFT: Primary | ICD-10-CM

## 2021-05-12 DIAGNOSIS — M25.522 LEFT ELBOW PAIN: ICD-10-CM

## 2021-05-12 DIAGNOSIS — M77.02 MEDIAL EPICONDYLITIS OF LEFT ELBOW: ICD-10-CM

## 2021-05-12 DIAGNOSIS — G44.229 CHRONIC TENSION-TYPE HEADACHE, NOT INTRACTABLE: Primary | ICD-10-CM

## 2021-05-12 PROCEDURE — 99213 OFFICE O/P EST LOW 20 MIN: CPT | Performed by: SURGERY

## 2021-05-12 RX ORDER — CYPROHEPTADINE HYDROCHLORIDE 4 MG/1
4 TABLET ORAL
Qty: 30 TABLET | Refills: 0 | Status: SHIPPED | OUTPATIENT
Start: 2021-05-12 | End: 2021-06-29

## 2021-05-12 RX ORDER — DEXAMETHASONE 1 MG
1 TABLET ORAL
Qty: 5 TABLET | Refills: 0 | Status: SHIPPED | OUTPATIENT
Start: 2021-05-12 | End: 2021-06-29

## 2021-05-12 NOTE — PROGRESS NOTES
ASSESSMENT/PLAN:        71-year-old female here for her left cubital syndrome and left medial epicondylitis  Patient has mild tenderness both areas today and is recommended to continue her occupational therapy and home exercise program provided by her therapist with the stretches and strengthening and the ulnar nerve glides  Patient agrees with this treatment plan area  Discussed with patient that frequent breaks may be warranted and also to the laying a towel down when she is leaning on a desk for work  We will follow-up with the patient as needed  The patient verbalized understanding of exam findings and treatment plan  We engaged in the shared decision-making process and treatment options were discussed at length with the patient  Surgical and conservative management discussed today along with risks and benefits  Diagnoses and all orders for this visit:    Cubital tunnel syndrome on left  -     Ambulatory referral to PT/OT hand therapy; Future    Left elbow pain  -     Ambulatory referral to PT/OT hand therapy; Future    Medial epicondylitis of left elbow  -     Ambulatory referral to PT/OT hand therapy; Future      Follow Up:  Return if symptoms worsen or fail to improve  To Do Next Visit:  Re-evaluation of current issue    ____________________________________________________________________________________________________________________________________________      CHIEF COMPLAINT:  Chief Complaint   Patient presents with    Left Elbow - Follow-up       SUBJECTIVE:  Nataliia Montejo is a 53y o  year old RHD female who presents Today for an 8 week follow-up for her left medial epicondylitis and ulnar neuritis  Since her last visit, patient had an ultrasound to further evaluate for cubital tunnel syndrome which was confirmed, at the same time patient had a ultrasound guided injection    Patient states that she had a flare reaction over the next 2 days, but it has calmed down and she notes she has at least 70% improvement of her symptoms  She is also in formal occupational therapy progressing nicely stretches and ulnar nerve glides  Patient notes that she does play guitar and it has been  I have personally reviewed all the relevant PMH, PSH, SH, FH, Medications and allergies  PAST MEDICAL HISTORY:  Past Medical History:   Diagnosis Date    Anxiety     Arthritis     oa cassandra knees    Asthma     good control- no medications    Yan's esophagus     Bipolar affective disorder (HCC)     Chronic pain disorder     lumbar    CPAP (continuous positive airway pressure) dependence     Degenerative disc disease at L5-S1 level     Deliberate self-cutting     Depression 09/16/2008    Disease of thyroid gland     hypo    MARTINEZ (dyspnea on exertion)     Drug overdose 10/28/2008    suicide attempt    Dysphagia     Dyspnea     Edema     BLE    GERD (gastroesophageal reflux disease)     High blood pressure     Hypertension     Knee pain, bilateral     Especially right    Migraines     Obese     Overactive bladder     Sjogren's disease ([address] )     Sleep apnea     Stress incontinence     Suicidal ideations     Umbilical hernia     surgical repair today 4/24/2019    Use of cane as ambulatory aid     awaiting b/l knee replacement    Wears glasses        PAST SURGICAL HISTORY:  Past Surgical History:   Procedure Laterality Date    BREAST BIOPSY Right 1989    benign    CARPAL TUNNEL RELEASE Left     CHOLECYSTECTOMY  05/2003    Laparoscopic    COLONOSCOPY      01/12/2009    DILATION AND CURETTAGE OF UTERUS      ELBOW SURGERY Left     x2   No hardware    ESOPHAGOGASTRODUODENOSCOPY      FOOT SURGERY Left     Plantar fasciotomy    HYSTERECTOMY      laporoscopic, partial    KNEE ARTHROSCOPY Bilateral     OOPHORECTOMY Left 10/2015    ME ANAL SPHINCTEROTOMY N/A 8/31/2018    Procedure: EUA, LEFT PARTIAL INTERNAL SPHINCTEROTOMY;  Surgeon: Lacho Garrison MD;  Location: [address] [place] [address];  Service: Colorectal    [place] GASTROCNEMIUS RECESSION Left 2/24/2021    Procedure: RECESSION GASTROC OPEN;  Surgeon: Britton Gill DPM;  Location: [address] [place] [address] MAIN OR;  Service: [facility] ZXFT,2+Q/C,SVAAR N/A 4/24/2019    Procedure: REPAIR HERNIA UMBILICAL LAPAROSCOPIC W/ ROBOTICS;  Surgeon: Phillip Huertas MD;  Location: [facility] Main [facility];  Service: General    REDUCTION MAMMAPLASTY Bilateral 1999    REPAIR LACERATION Left     left hand-5/18/2009    ROTATOR CUFF REPAIR Left     TONSILECTOMY AND ADNOIDECTOMY      TONSILLECTOMY      US GUIDED MSK PROCEDURE  4/22/2021    VEIN LIGATION Bilateral     WISDOM TOOTH EXTRACTION         FAMILY HISTORY:  Family History   Problem Relation Age of Onset    Kidney cancer Mother     Diabetes Mother     Colon cancer Family     No Known Problems Father     No Known Problems Sister     Colon cancer Paternal Uncle     Colon cancer Maternal Uncle     Colon cancer Maternal Uncle        SOCIAL HISTORY:  Social History     Tobacco Use    Smoking status: Former Smoker     Packs/day: 3 00     Types: Cigarettes     Quit date: 1/2/2018     Years since quitting: 3 3    Smokeless tobacco: Never Used    Tobacco comment: Started at age 14     Substance Use Topics    Alcohol use: Not Currently     Comment: Recovering alcoholic    Drug use: Yes     Types: Marijuana     Comment: medical- vapes 3 times per wk at        MEDICATIONS:    Current Outpatient Medications:     albuterol (ProAir HFA) 90 mcg/act inhaler, Inhale 2 puffs every 4 (four) hours as needed for wheezing, Disp: 8 5 g, Rfl: 1    amLODIPine (NORVASC) 5 mg tablet, Take 1 tablet (5 mg total) by mouth daily, Disp: 30 tablet, Rfl: 5    buPROPion (WELLBUTRIN XL) 300 mg 24 hr tablet, Take 300 mg by mouth daily , Disp: , Rfl:     colestipol (COLESTID) 1 g tablet, Take 1 tablet (1 g total) by mouth 2 (two) times a day, Disp: 60 tablet, Rfl: 0    divalproex sodium (DEPAKOTE) 500 mg EC tablet, Take 500 mg by mouth daily , Disp: , Rfl:     ergocalciferol (VITAMIN D2) 50,000 units, Take 1 capsule (50,000 Units total) by mouth once a week, Disp: 8 capsule, Rfl: 0    estradiol (ESTRACE) 1 mg tablet, Take 1 tablet (1 mg total) by mouth daily, Disp: 90 tablet, Rfl: 3    famotidine (PEPCID) 20 mg tablet, , Disp: , Rfl:     furosemide (LASIX) 20 mg tablet, TAKE 1 TABLET BY MOUTH DAILY, Disp: 30 tablet, Rfl: 3    ketorolac (TORADOL) 10 mg tablet, 1 tabs at onset of migraine, can repeat X1 in 6 hours, can combine with triptan    Take with food/milk/antacid , Disp: 10 tablet, Rfl: 3    levothyroxine 50 mcg tablet, Take 1 tablet (50 mcg total) by mouth daily in the early morning, Disp: 30 tablet, Rfl: 5    loratadine (CLARITIN) 10 mg tablet, Take 1 tablet (10 mg total) by mouth daily, Disp: 30 tablet, Rfl: 5    losartan (COZAAR) 50 mg tablet, Take 50 mg by mouth daily , Disp: , Rfl:     Lurasidone HCl (Latuda) 60 MG TABS, Take 60 mg by mouth daily with dinner , Disp: , Rfl:     memantine (NAMENDA) 10 mg tablet, 1 twice a day, Disp: 60 tablet, Rfl: 6    metoprolol tartrate (LOPRESSOR) 25 mg tablet, TAKE ONE TABLET BY MOUTH TWICE A DAY, Disp: 60 tablet, Rfl: 4    Neomycin-Polymyxin-Dexameth (MAXITROL OP), Administer to both eyes as needed Eye ointment, Disp: , Rfl:     pantoprazole (PROTONIX) 40 mg tablet, Take 40 mg by mouth daily , Disp: , Rfl:     pilocarpine (SALAGEN) 5 mg tablet, Take 1 tablet (5 mg total) by mouth 2 (two) times a day, Disp: 60 tablet, Rfl: 5    RABEprazole (ACIPHEX) 20 MG tablet, Take 20 mg by mouth every 12 (twelve) hours, Disp: , Rfl:     topiramate (TOPAMAX) 100 mg tablet, Take 100 mg by mouth 2 (two) times a day, Disp: , Rfl:     TOVIAZ 4 MG TB24, Take 1 tablet (4 mg total) by mouth daily, Disp: , Rfl: 2    traZODone (DESYREL) 150 mg tablet, Take 150 mg by mouth daily at bedtime as needed , Disp: , Rfl:     triamcinolone (KENALOG) 0 1 % ointment, Apply topically 2 (two) times a day, Disp: 30 g, Rfl: 2    ALLERGIES:  Allergies   Allergen Reactions    Percocet [Oxycodone-Acetaminophen] Headache     Severe headaches    Betadine [Povidone Iodine] Rash     Unsure if betadine or gauze post surgical  Got rash on leg  REVIEW OF SYSTEMS:  Review of Systems   Constitutional: Negative for chills, fever and unexpected weight change  HENT: Negative for hearing loss, nosebleeds and sore throat  Eyes: Negative for pain, redness and visual disturbance  Respiratory: Negative for cough, shortness of breath and wheezing  Cardiovascular: Negative for chest pain, palpitations and leg swelling  Gastrointestinal: Negative for abdominal pain and nausea  Genitourinary: Negative for dyspareunia, dysuria and frequency  Skin: Negative for rash and wound  Neurological: Negative for dizziness, numbness and headaches  Psychiatric/Behavioral: Negative for decreased concentration and suicidal ideas  The patient is not nervous/anxious          VITALS:  Vitals:    05/12/21 0853   BP: 138/84   Pulse: 68       LABS:  HgA1c:   Lab Results   Component Value Date    HGBA1C 4 9 12/18/2020     BMP:   Lab Results   Component Value Date    CALCIUM 8 5 03/03/2021    K 3 5 03/03/2021    CO2 27 03/03/2021     03/03/2021    BUN 17 03/03/2021    CREATININE 0 72 03/03/2021       _____________________________________________________  PHYSICAL EXAMINATION:  General: well developed and well nourished, alert, oriented times 3 and appears comfortable  Psychiatric: Normal  HEENT: Normocephalic, Atraumatic Trachea Midline, No torticollis  Pulmonary: No audible wheezing or respiratory distress   Cardiovascular: No pitting edema, 2+ radial pulse   Skin: No masses, erythema, lacerations, fluctation, ulcerations  Neurovascular: Sensation intact to the Median Nerve, Sensation Intact to the Radial Nerve, Decreased Sensation to  the Ulnar Nerve, Motor Intact to the Median, Ulnar, Radial Nerve and Pulses Intact  Musculoskeletal: Normal, except as noted in detailed exam and in HPI        MUSCULOSKELETAL EXAMINATION:    Left elbow:     + mildly tenderness over the flexor pronator mass  + Ulnar nerve  Full composite fist  Opposition intact  Cross finger intact  Sensation intact to light touch distally  Brisk capillary refill   ___________________________________________________  STUDIES REVIEWED:  I have personally reviewed US left elbow   which demonstrate   Cubital tunnel syndrome       PROCEDURES PERFORMED:  Procedures  No Procedures performed today    _____________________________________________________      Brett Cano    I,:  Yokasta Mcdonnell am acting as a scribe while in the presence of the attending physician :       I,:  Kristen Armijo MD personally performed the services described in this documentation    as scribed in my presence :

## 2021-05-12 NOTE — TELEPHONE ENCOUNTER
Patient said she called [employer] last night but did not receive return call  She is s/p first botox injections on May 6  She called to report headaches progressively worsening since botox; currently "8/10" throbbing across forehead   and back of neck with nausea, denies other symptoms  She said she has "brain fog" since testing + for COVID, but has since received the vaccine  does not recall anything relieving headache in past  except excedrin  Takes:  advil , tylenol   namenda 10 mg bid  depakote 500 mg  topamax 100 mg bid  vitamin D3    never picked up toradol as ordered in past   Please provide recommendation  Thank you      best paty is [phone], ok to leave detailed message

## 2021-05-12 NOTE — TELEPHONE ENCOUNTER
Patient called saying that her head ache as gotten worse since she got the Botox injection and would like for someone in the to give her a call

## 2021-05-12 NOTE — TELEPHONE ENCOUNTER
Please have her take decadron 1 mg in am for 5 days and cyproheptadine 4 mg at bedtime for 14 nights  Please have her  the toradol  This can happen after botox the first time    Sorry she didn't receive a call back last night

## 2021-05-13 ENCOUNTER — TRANSCRIBE ORDERS (OUTPATIENT)
Dept: PHYSICAL THERAPY | Facility: CLINIC | Age: 51
End: 2021-05-13

## 2021-05-13 ENCOUNTER — EVALUATION (OUTPATIENT)
Dept: PHYSICAL THERAPY | Facility: CLINIC | Age: 51
End: 2021-05-13
Payer: MEDICARE

## 2021-05-13 DIAGNOSIS — M77.02 MEDIAL EPICONDYLITIS OF LEFT ELBOW: ICD-10-CM

## 2021-05-13 DIAGNOSIS — S86.002D UNSPECIFIED INJURY OF LEFT ACHILLES TENDON, SUBSEQUENT ENCOUNTER: ICD-10-CM

## 2021-05-13 DIAGNOSIS — M25.572 LEFT ANKLE PAIN, UNSPECIFIED CHRONICITY: ICD-10-CM

## 2021-05-13 DIAGNOSIS — M72.2 PLANTAR FASCIITIS OF LEFT FOOT: Primary | ICD-10-CM

## 2021-05-13 PROCEDURE — 97140 MANUAL THERAPY 1/> REGIONS: CPT | Performed by: PHYSICAL THERAPIST

## 2021-05-13 PROCEDURE — 97112 NEUROMUSCULAR REEDUCATION: CPT | Performed by: PHYSICAL THERAPIST

## 2021-05-13 PROCEDURE — 97530 THERAPEUTIC ACTIVITIES: CPT | Performed by: PHYSICAL THERAPIST

## 2021-05-13 NOTE — PROGRESS NOTES
PT Re-Evaluation     Today's date: 2021  Patient name: Matteo Moses  : 1970  MRN: [id number]  Referring provider: Neris Adan DPM  Dx:   Encounter Diagnosis     ICD-10-CM    1  Plantar fasciitis of left foot  M72 2    2  Unspecified injury of left Achilles tendon, subsequent encounter  S86 002D    3  Left ankle pain, unspecified chronicity  M25 572    4  Medial epicondylitis of left elbow  M77 02                   Assessment  Assessment details: Matteo Moses is a 53 y o  female who presents with signs and symptoms of left elbow medial epicondylitis and post operative achillles lengthening surgery? She has been making steady progress with pain levels, ROM, and function since her evaluation  She continues to have mild deficits in function and activity tolerance, as well as mild pain levels  Patient would benefit from continued physical therapy in order to address said deficits and  Maximize function     Impairments: abnormal gait, abnormal or restricted ROM, abnormal movement, activity intolerance, impaired balance, impaired physical strength, lacks appropriate home exercise program, pain with function, weight-bearing intolerance, poor posture  and poor body mechanics  Barriers to therapy:     Understanding of Dx/Px/POC: good   Prognosis: fair  Prognosis details: 2 prior failed elbow surgeries    Goals  STG:  Decrease pain 1 grade -met  Demonstrate proper ergonomic posture -met  Independent with HEP-not met   Wean out of CAM boot -met    LTG:  Decrease pain 2 grades-mostly met   Improve strength at least 1/2 grade -met  Lift 15# without difficulty -progressing   Push/pull 15# without difficulty   Increase ankle ROM > 10 degrees  Tolerate standing > 1 hour at a time   Negotiate stairs reciprocally  Increase FOTO to expected score      Plan  Patient would benefit from: skilled physical therapy  Planned modality interventions: cryotherapy, low level laser therapy and thermotherapy: hydrocollator packs  Planned therapy interventions: abdominal trunk stabilization, manual therapy, massage, joint mobilization, patient education, self care, stretching, strengthening, therapeutic activities, therapeutic exercise, flexibility, neuromuscular re-education and home exercise program  Frequency: 2x week  Duration in weeks: 8  Treatment plan discussed with: patient        Subjective Evaluation    History of Present Illness  Mechanism of injury: Patient reports her foot is is much better  Elbow 5/10 at worst though no pain at at rest  She feels much improved after her injection to her elbow and shes had a lot less pain  She feels a bit of a marble under her 5th toe on left foot and notes she was told it is a callus, she believes  She has mild discomfort walking barefoot though has minimal foot/ankle pain    ------------------------------------------------------------------------------------------Patient reports flare up of elbow pain which she was treated for previously She is also being treated for left ankle achilles lengthening  History of 2 surgeries on left medial elbow (most recent 2-3 years ago)  She notes elbow pain resumed about 1-2 months ago at work due to pulling and lifting  She Is getting n/t into last 3 digits on left hand, has difficulty planing guitar due to tingling  Most pain with lifting, pulling ( she has a 15# restriction at this time)     She is right hand dominant  is getting MRI on  and supposed to follow up with MD on  scheduled for surgery for her plantar fasciitis and notes her left hip is getting sore too  Pain  Current pain ratin  At worst pain rating: 3  Location: n/t into last 4 fingers currently     Patient Goals  Patient goals for therapy: decreased pain  Patient goal: have no dificulty grabbing or lifting         Objective     Tenderness     Left Elbow   Tenderness in the cubital tunnel and medial epicondyle       Left Wrist/Hand   Tenderness in the medial epicondyle  Additional Tenderness Details  Improved n/t in arm     Active Range of Motion     Left Elbow   Flexion: WFL  Extension: WFL  Forearm supination: 86 degrees   Forearm pronation: 114 degrees     Right Elbow   Normal active range of motion  Flexion: WFL  Extension: WFL  Forearm supination: 98 degrees   Forearm pronation: 88 degrees   Left Ankle/Foot   Dorsiflexion (kf): 16 degrees   Plantar flexion: 54 degrees   Inversion: 20 degrees   Eversion: 34 degrees     Strength/Myotome Testing     Left Elbow   Flexion: 5 (pain )  Extension: 5  Forearm supination: 5  Forearm pronation: 5 (pain)    Right Elbow   Flexion: 5  Extension: 5  Forearm supination: 5  Forearm pronation: 5    Left Ankle/Foot   Dorsiflexion: 5  Plantar flexion: 4  Inversion: 5  Eversion: 5    Additional Strength Details  Wrist flexion/extn 5/5 bilaterally     Right  neutral 71#  Left  neutral 67#              Precautions: Depression, anxiety  Per MD (as of 3/25): "continue PT, advised strengthening only, no stretching  Advised to D/C shoes and return to boot"         Manuals 4/14 4/19 4/21 4/28 5/5 5/10 5/13 RE   Graston to medial elbow/flexors 6' 6' 6' 8' 6' 6' 6'   Laser to medial elbows  6' 6' 6' 6' 6' 6' 6'                       Neuro Re-Ed          SLS          Steamboats           Weight shift front/back  10 ea 15 ea NV      sidestepping  3 laps at table 3 laps at table NV 4 laps  4laps    Stand HR   x15 NV 2x10 2x10 2x10                        Ther Ex          UBE 2'/2' 2'/2' 2'/2' 2'/2' 2'/2' 2'/2' NV   Bike (when out of boot)  NV 5 min 5 min 5 min 5 min 5' min    Seated heel/toe raises  2x10 ea 2x10 ea 2x10 w/ 5# on thigh NV seated TR 2x10 seated TR 2x10 standing   2x10  Ea    Ankle pumps           Towel curls           TB side step          Wrist flexion stretch 10x10" 10x10"  10x10" 10x10"     Wrist extension stretch 10x10" 10x10"  10x10" 10x10"      DB wrist flexion 2# 2x10 2# 2x10 2# 2x10 2# 2x10 2# 2x10 3# 2x10    DB wrist extension 2# 2x10 2# 2x10 2# 2x10 2# 2x10 2# 2x10 3# 2x10    DB sup/prone 2# 2x10 2# 2x10 2# 2x10 2# 2x10 2# 2x10 3# 2x10    DB radial dev 2# 2x10 2# 2x10 2# 2x10 2# 2x10 2# 2x10 3# 2x10    DB ulnar dev 2# 2x10 2# 2x10 2# 2x10 2# 2x10 2# 2x10 3# 2x10    Ankle TB 4 way  GTB 2x10 GTB 2x10 NV NV GTB 2x10 ea NV                                            Ther Activity          Putty squeezes          Putty pinches          digi flexion NV?  Red x30 Red x30 Red x30 Red x30 Red 30x    therabar bending           therabar wringing NV Red x20 NV Red x20 Red x20 Red x20              Step up and overs      4" x10                                  Modalities

## 2021-05-13 NOTE — LETTER
May 13, 2021    Sharla De La O, [facility]  [address] [place] [address]    Patient: Arcenio North   YOB: 1970   Date of Visit: 2021     Encounter Diagnosis     ICD-10-CM    1  Plantar fasciitis of left foot  M72 2    2  Unspecified injury of left Achilles tendon, subsequent encounter  S86 002D    3  Left ankle pain, unspecified chronicity  M25 572    4  Medial epicondylitis of left elbow  M77 02        Dear Dr Mariaelena Hankins: Thank you for your recent referral of Arcenio North  Please review the attached evaluation summary from Blanca's recent visit  Please verify that you agree with the plan of care by signing the attached order  If you have any questions or concerns, please do not hesitate to call  I sincerely appreciate the opportunity to share in the care of one of your patients and hope to have another opportunity to work with you in the near future  Sincerely,    Kane Alexander, PT      Referring Provider:      I certify that I have read the below Plan of Care and certify the need for these services furnished under this plan of treatment while under my care  Shalra De La O DPM  [address] [address]  Via Fax: [phone]          PT Re-Evaluation     Today's date: 2021  Patient name: Arcenio North  : 1970  MRN: [id number]  Referring provider: Ramin Donovan DPM  Dx:   Encounter Diagnosis     ICD-10-CM    1  Plantar fasciitis of left foot  M72 2    2  Unspecified injury of left Achilles tendon, subsequent encounter  S86 002D    3  Left ankle pain, unspecified chronicity  M25 572    4  Medial epicondylitis of left elbow  M77 02                   Assessment  Assessment details: Arcenio North is a 53 y o  female who presents with signs and symptoms of left elbow medial epicondylitis and post operative achillles lengthening surgery?  She has been making steady progress with pain levels, ROM, and function since her evaluation  She continues to have mild deficits in function and activity tolerance, as well as mild pain levels  Patient would benefit from continued physical therapy in order to address said deficits and  Maximize function  Impairments: abnormal gait, abnormal or restricted ROM, abnormal movement, activity intolerance, impaired balance, impaired physical strength, lacks appropriate home exercise program, pain with function, weight-bearing intolerance, poor posture  and poor body mechanics  Barriers to therapy:     Understanding of Dx/Px/POC: good   Prognosis: fair  Prognosis details: 2 prior failed elbow surgeries    Goals  STG:  Decrease pain 1 grade -met  Demonstrate proper ergonomic posture -met  Independent with HEP-not met   Wean out of CAM boot -met    LTG:  Decrease pain 2 grades-mostly met   Improve strength at least 1/2 grade -met  Lift 15# without difficulty -progressing   Push/pull 15# without difficulty   Increase ankle ROM > 10 degrees  Tolerate standing > 1 hour at a time   Negotiate stairs reciprocally  Increase FOTO to expected score      Plan  Patient would benefit from: skilled physical therapy  Planned modality interventions: cryotherapy, low level laser therapy and thermotherapy: hydrocollator packs  Planned therapy interventions: abdominal trunk stabilization, manual therapy, massage, joint mobilization, patient education, self care, stretching, strengthening, therapeutic activities, therapeutic exercise, flexibility, neuromuscular re-education and home exercise program  Frequency: 2x week  Duration in weeks: 8  Treatment plan discussed with: patient        Subjective Evaluation    History of Present Illness  Mechanism of injury: Patient reports her foot is is much better  Elbow 5/10 at worst though no pain at at rest  She feels much improved after her injection to her elbow and shes had a lot less pain  She feels a bit of a marble under her 5th toe on left foot and notes she was told it is a callus, she believes  She has mild discomfort walking barefoot though has minimal foot/ankle pain    ------------------------------------------------------------------------------------------Patient reports flare up of elbow pain which she was treated for previously She is also being treated for left ankle achilles lengthening  History of 2 surgeries on left medial elbow (most recent 2-3 years ago)  She notes elbow pain resumed about 1-2 months ago at work due to pulling and lifting  She Is getting n/t into last 3 digits on left hand, has difficulty planing guitar due to tingling  Most pain with lifting, pulling ( she has a 15# restriction at this time)     She is right hand dominant  is getting MRI on  and supposed to follow up with MD on  scheduled for surgery for her plantar fasciitis and notes her left hip is getting sore too  Pain  Current pain ratin  At worst pain rating: 3  Location: n/t into last 4 fingers currently     Patient Goals  Patient goals for therapy: decreased pain  Patient goal: have no dificulty grabbing or lifting         Objective     Tenderness     Left Elbow   Tenderness in the cubital tunnel and medial epicondyle  Left Wrist/Hand   Tenderness in the medial epicondyle       Additional Tenderness Details  Improved n/t in arm     Active Range of Motion     Left Elbow   Flexion: WFL  Extension: WFL  Forearm supination: 86 degrees   Forearm pronation: 114 degrees     Right Elbow   Normal active range of motion  Flexion: WFL  Extension: WFL  Forearm supination: 98 degrees   Forearm pronation: 88 degrees   Left Ankle/Foot   Dorsiflexion (kf): 16 degrees   Plantar flexion: 54 degrees   Inversion: 20 degrees   Eversion: 34 degrees     Strength/Myotome Testing     Left Elbow   Flexion: 5 (pain )  Extension: 5  Forearm supination: 5  Forearm pronation: 5 (pain)    Right Elbow   Flexion: 5  Extension: 5  Forearm supination: 5  Forearm pronation: 5    Left Ankle/Foot   Dorsiflexion: 5  Plantar flexion: 4  Inversion: 5  Eversion: 5    Additional Strength Details  Wrist flexion/extn 5/5 bilaterally     Right  neutral 71#  Left  neutral 67#              Precautions: Depression, anxiety  Per MD (as of 3/25): "continue PT, advised strengthening only, no stretching  Advised to D/C shoes and return to boot"  Manuals 4/14 4/19 4/21 4/28 5/5 5/10 5/13 RE   Graston to medial elbow/flexors 6' 6' 6' 8' 6' 6' 6'   Laser to medial elbows  6' 6' 6' 6' 6' 6' 6'                       Neuro Re-Ed          SLS          Steamboats           Weight shift front/back  10 ea 15 ea NV      sidestepping  3 laps at table 3 laps at table NV 4 laps  4laps    Stand HR   x15 NV 2x10 2x10 2x10                        Ther Ex          UBE 2'/2' 2'/2' 2'/2' 2'/2' 2'/2' 2'/2' NV   Bike (when out of boot)  NV 5 min 5 min 5 min 5 min 5' min    Seated heel/toe raises  2x10 ea 2x10 ea 2x10 w/ 5# on thigh NV seated TR 2x10 seated TR 2x10 standing   2x10  Ea    Ankle pumps           Towel curls           TB side step          Wrist flexion stretch 10x10" 10x10"  10x10" 10x10"     Wrist extension stretch 10x10" 10x10"  10x10" 10x10"      DB wrist flexion 2# 2x10 2# 2x10 2# 2x10 2# 2x10 2# 2x10 3# 2x10    DB wrist extension 2# 2x10 2# 2x10 2# 2x10 2# 2x10 2# 2x10 3# 2x10    DB sup/prone 2# 2x10 2# 2x10 2# 2x10 2# 2x10 2# 2x10 3# 2x10    DB radial dev 2# 2x10 2# 2x10 2# 2x10 2# 2x10 2# 2x10 3# 2x10    DB ulnar dev 2# 2x10 2# 2x10 2# 2x10 2# 2x10 2# 2x10 3# 2x10    Ankle TB 4 way  GTB 2x10 GTB 2x10 NV NV GTB 2x10 ea NV                                            Ther Activity          Putty squeezes          Putty pinches          digi flexion NV?  Red x30 Red x30 Red x30 Red x30 Red 30x    therabar bending           therabar wringing NV Red x20 NV Red x20 Red x20 Red x20              Step up and overs      4" x10                                  Modalities

## 2021-05-16 DIAGNOSIS — H01.115 ALLERGIC DERMATITIS OF UPPER AND LOWER LIDS OF BOTH EYES: ICD-10-CM

## 2021-05-16 DIAGNOSIS — H01.114 ALLERGIC DERMATITIS OF UPPER AND LOWER LIDS OF BOTH EYES: ICD-10-CM

## 2021-05-16 DIAGNOSIS — H01.112 ALLERGIC DERMATITIS OF UPPER AND LOWER LIDS OF BOTH EYES: ICD-10-CM

## 2021-05-16 DIAGNOSIS — H01.111 ALLERGIC DERMATITIS OF UPPER AND LOWER LIDS OF BOTH EYES: ICD-10-CM

## 2021-05-16 RX ORDER — LORATADINE 10 MG/1
10 TABLET ORAL DAILY
Qty: 30 TABLET | Refills: 0 | Status: CANCELLED | OUTPATIENT
Start: 2021-05-16

## 2021-05-17 ENCOUNTER — OFFICE VISIT (OUTPATIENT)
Dept: PHYSICAL THERAPY | Facility: CLINIC | Age: 51
End: 2021-05-17
Payer: MEDICARE

## 2021-05-17 DIAGNOSIS — S86.002D UNSPECIFIED INJURY OF LEFT ACHILLES TENDON, SUBSEQUENT ENCOUNTER: ICD-10-CM

## 2021-05-17 DIAGNOSIS — M72.2 PLANTAR FASCIITIS OF LEFT FOOT: Primary | ICD-10-CM

## 2021-05-17 DIAGNOSIS — H01.111 ALLERGIC DERMATITIS OF UPPER AND LOWER LIDS OF BOTH EYES: ICD-10-CM

## 2021-05-17 DIAGNOSIS — H01.112 ALLERGIC DERMATITIS OF UPPER AND LOWER LIDS OF BOTH EYES: ICD-10-CM

## 2021-05-17 DIAGNOSIS — H01.114 ALLERGIC DERMATITIS OF UPPER AND LOWER LIDS OF BOTH EYES: ICD-10-CM

## 2021-05-17 DIAGNOSIS — H01.115 ALLERGIC DERMATITIS OF UPPER AND LOWER LIDS OF BOTH EYES: ICD-10-CM

## 2021-05-17 DIAGNOSIS — M77.02 MEDIAL EPICONDYLITIS OF LEFT ELBOW: ICD-10-CM

## 2021-05-17 DIAGNOSIS — M25.572 LEFT ANKLE PAIN, UNSPECIFIED CHRONICITY: ICD-10-CM

## 2021-05-17 PROCEDURE — 97140 MANUAL THERAPY 1/> REGIONS: CPT

## 2021-05-17 PROCEDURE — 97110 THERAPEUTIC EXERCISES: CPT

## 2021-05-17 RX ORDER — LORATADINE 10 MG/1
10 TABLET ORAL DAILY
Qty: 30 TABLET | Refills: 5 | Status: SHIPPED | OUTPATIENT
Start: 2021-05-17 | End: 2021-08-28

## 2021-05-17 NOTE — PROGRESS NOTES
Daily Note     Today's date: 2021  Patient name: Eliecer Nogueira  : 1970  MRN: [id number]  Referring provider: Lisa Balderas DPM  Dx:   Encounter Diagnosis     ICD-10-CM    1  Plantar fasciitis of left foot  M72 2    2  Unspecified injury of left Achilles tendon, subsequent encounter  S86 002D    3  Left ankle pain, unspecified chronicity  M25 572    4  Medial epicondylitis of left elbow  M77 02                   Subjective: Pt states her L heel occasionally hurts  Objective: See treatment diary below      Assessment: Tolerated treatment fair  Patient would benefit from continued PT  Occasional cueing needed for proper technique w/ TE  No adverse effects noted w/ IASTM  Plan: Progress treatment as tolerated  Precautions: Depression, anxiety  Per MD (as of 3/25): "continue PT, advised strengthening only, no stretching  Advised to D/C shoes and return to boot"         Manuals  RE   Graston to medial elbow/flexors 6' CF      6'   Laser to medial elbows  6' CF      6'                       Neuro Re-Ed          SLS          Steamboats           Weight shift front/back          sidestepping 4 laps     4laps    Stand HR      2x10 2x10                        Ther Ex          UBE 2'/2'      NV   Bike (when out of boot) 5 min      5' min    Seated heel/toe raises  standing 2x10 ea      standing   2x10  Ea    Ankle pumps           Towel curls           TB side step          Wrist flexion stretch     10x10"     Wrist extension stretch     10x10"      DB wrist flexion 3# 2x10     3# 2x10    DB wrist extension 3# 2x10     3# 2x10    DB sup/prone 3# 2x10     3# 2x10    DB radial dev 3# 2x10     3# 2x10    DB ulnar dev 3# 2x10     3# 2x10    Ankle TB 4 way      GTB 2x10 ea NV                                            Ther Activity          Putty squeezes          Putty pinches          digi flexion Green 30x     Red 30x    therabar bending           therabar wringing Red x20     Red x20              Step up and overs 4" x10     4" x10                                  Modalities                                1:1 [phone] (under supervision of PTA, CF)

## 2021-05-18 ENCOUNTER — TRANSCRIBE ORDERS (OUTPATIENT)
Dept: ADMINISTRATIVE | Facility: HOSPITAL | Age: 51
End: 2021-05-18

## 2021-05-18 ENCOUNTER — TELEPHONE (OUTPATIENT)
Dept: OBGYN CLINIC | Facility: CLINIC | Age: 51
End: 2021-05-18

## 2021-05-18 DIAGNOSIS — Z12.31 ENCOUNTER FOR SCREENING MAMMOGRAM FOR MALIGNANT NEOPLASM OF BREAST: Primary | ICD-10-CM

## 2021-05-18 NOTE — TELEPHONE ENCOUNTER
Pt wants a screening mammogram script put into [facility]'s system  Has appt 5/20/21   [facility]

## 2021-05-19 ENCOUNTER — OFFICE VISIT (OUTPATIENT)
Dept: PHYSICAL THERAPY | Facility: CLINIC | Age: 51
End: 2021-05-19
Payer: MEDICARE

## 2021-05-19 DIAGNOSIS — M25.572 LEFT ANKLE PAIN, UNSPECIFIED CHRONICITY: ICD-10-CM

## 2021-05-19 DIAGNOSIS — S86.002D UNSPECIFIED INJURY OF LEFT ACHILLES TENDON, SUBSEQUENT ENCOUNTER: ICD-10-CM

## 2021-05-19 DIAGNOSIS — Z12.31 SCREENING MAMMOGRAM FOR HIGH-RISK PATIENT: Primary | ICD-10-CM

## 2021-05-19 DIAGNOSIS — M77.02 MEDIAL EPICONDYLITIS OF LEFT ELBOW: ICD-10-CM

## 2021-05-19 DIAGNOSIS — M72.2 PLANTAR FASCIITIS OF LEFT FOOT: Primary | ICD-10-CM

## 2021-05-19 PROCEDURE — 97110 THERAPEUTIC EXERCISES: CPT | Performed by: PHYSICAL THERAPIST

## 2021-05-19 PROCEDURE — 97112 NEUROMUSCULAR REEDUCATION: CPT | Performed by: PHYSICAL THERAPIST

## 2021-05-19 PROCEDURE — 97140 MANUAL THERAPY 1/> REGIONS: CPT | Performed by: PHYSICAL THERAPIST

## 2021-05-19 NOTE — PROGRESS NOTES
Daily Note     Today's date: 2021  Patient name: Donalda Phoenix  : 1970  MRN: [id number]  Referring provider: Hernan Dorado DPM  Dx:   Encounter Diagnosis     ICD-10-CM    1  Plantar fasciitis of left foot  M72 2    2  Unspecified injury of left Achilles tendon, subsequent encounter  S86 002D    3  Left ankle pain, unspecified chronicity  M25 572    4  Medial epicondylitis of left elbow  M77 02                   Subjective: Patient reports she is doing better without her cane and has had minimal knee pain  She also feels more comfortable on stairs  Objective: See treatment diary below      Assessment: Tolerated treatment well  Patient would benefit from continued PT  Patient requires cues for form and proper exercise technique, no complaints with exercises today  She has no tenderness with manuals, and needs occasional cues to stay on task, with easy distraction  Plan: Progress treatment as tolerated  Precautions: Depression, anxiety  Per MD (as of 3/25): "continue PT, advised strengthening only, no stretching  Advised to D/C shoes and return to boot"         Manuals  RE   Graston to medial elbow/flexors 6' CF 6'     6'   Laser to medial elbows  6' CF 4'     6'                       Neuro Re-Ed          SLS  NV        Steamboats   NV                  sidestepping 4 laps TB NV    4laps          2x10 2x10                        Ther Ex          UBE 2'/2' 2'/2'     NV   Bike (when out of boot) 5 min 6'     5' min    heel/toe raises  standing 2x10 ea 2x10 stand     standing   2x10  Ea              Towel curls           TB side step          Wrist flexion stretch     10x10"     Wrist extension stretch     10x10"      DB wrist flexion 3# 2x10 3# 2x10    3# 2x10    DB wrist extension 3# 2x10 3# 2x10    3# 2x10    DB sup/prone 3# 2x10 3# 20x    3# 2x10    DB radial dev 3# 2x10 3# 20x    3# 2x10    DB ulnar dev 3# 2x10 3# 20x     3# 2x10    Ankle TB 4 way   B TB 2x10 GTB 2x10 ea NV                                            Ther Activity          Putty squeezes          Putty pinches          digi flexion Green 30x Green 30x     Red 30x    therabar bending           therabar wringing Red x20 R x20    Red x20              Step up and overs 4" x10 NV    4" x10                                  Modalities

## 2021-05-20 ENCOUNTER — HOSPITAL ENCOUNTER (OUTPATIENT)
Dept: RADIOLOGY | Age: 51
Discharge: HOME | End: 2021-05-20
Payer: MEDICARE

## 2021-05-20 VITALS — BODY MASS INDEX: 43.39 KG/M2 | WEIGHT: 270 LBS | HEIGHT: 66 IN

## 2021-05-20 DIAGNOSIS — Z12.31 ENCOUNTER FOR SCREENING MAMMOGRAM FOR MALIGNANT NEOPLASM OF BREAST: ICD-10-CM

## 2021-05-20 DIAGNOSIS — Z12.31 BREAST CANCER SCREENING BY MAMMOGRAM: Primary | ICD-10-CM

## 2021-05-20 PROCEDURE — 77067 SCR MAMMO BI INCL CAD: CPT

## 2021-05-20 PROCEDURE — 77063 BREAST TOMOSYNTHESIS BI: CPT

## 2021-05-24 ENCOUNTER — OFFICE VISIT (OUTPATIENT)
Dept: PHYSICAL THERAPY | Facility: CLINIC | Age: 51
End: 2021-05-24
Payer: MEDICARE

## 2021-05-24 DIAGNOSIS — S86.002D UNSPECIFIED INJURY OF LEFT ACHILLES TENDON, SUBSEQUENT ENCOUNTER: ICD-10-CM

## 2021-05-24 DIAGNOSIS — M25.572 LEFT ANKLE PAIN, UNSPECIFIED CHRONICITY: ICD-10-CM

## 2021-05-24 DIAGNOSIS — M72.2 PLANTAR FASCIITIS OF LEFT FOOT: Primary | ICD-10-CM

## 2021-05-24 DIAGNOSIS — M77.02 MEDIAL EPICONDYLITIS OF LEFT ELBOW: ICD-10-CM

## 2021-05-24 PROCEDURE — 97140 MANUAL THERAPY 1/> REGIONS: CPT

## 2021-05-24 PROCEDURE — 97110 THERAPEUTIC EXERCISES: CPT

## 2021-05-24 PROCEDURE — 97112 NEUROMUSCULAR REEDUCATION: CPT

## 2021-05-24 NOTE — PROGRESS NOTES
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  Also reports decreased incidence of LE swelling because she has been more active  Objective: See treatment diary below      Assessment: Tolerated treatment fair  Patient would benefit from continued PT  Pt c/o increased elbow soreness w/ reverse UBE  Significant difficulty noted w/ addition of steamboats, clement w/ R WB  Good response to IASTM, Laser  Plan: Progress treatment as tolerated  Precautions: Depression, anxiety  Per MD (as of 3/25): "continue PT, advised strengthening only, no stretching  Advised to D/C shoes and return to boot"         Manuals  RE   Graston to medial elbow/flexors 6' CF 6' 6'    6'   Laser to medial elbows  6' CF 4' 6'    6'                       Neuro Re-Ed          SLS  NV 10"x4       Steamboats   NV RTB 10 ea BL                 sidestepping 4 laps TB NV RTB 3 laps   4laps          2x10 2x10                        Ther Ex          UBE 2'/2' 2'/2' 2'/2'    NV   Bike (when out of boot) 5 min 6' 5'    5' min    heel/toe raises  standing 2x10 ea 2x10 stand 2x10 stand    standing   2x10  Ea              Towel curls           TB side step          Wrist flexion stretch     10x10"     Wrist extension stretch     10x10"      DB wrist flexion 3# 2x10 3# 2x10 3# 2x10   3# 2x10    DB wrist extension 3# 2x10 3# 2x10 3# 2x10   3# 2x10    DB sup/prone 3# 2x10 3# 20x 3# x20   3# 2x10    DB radial dev 3# 2x10 3# 20x 3# x20   3# 2x10    DB ulnar dev 3# 2x10 3# 20x  3# x20   3# 2x10    Ankle TB 4 way   B TB 2x10  BTB 2x10 ea  GTB 2x10 ea NV                                            Ther Activity          Putty squeezes          Putty pinches          digi flexion Green 30x Green 30x  Green 30x   Red 30x    therabar bending           therabar wringing Red x20 R x20 R x20   Red x20              Step up and overs 4" x10 NV 4"x10   4" x10                                  Modalities

## 2021-05-26 ENCOUNTER — APPOINTMENT (OUTPATIENT)
Dept: PHYSICAL THERAPY | Facility: CLINIC | Age: 51
End: 2021-05-26
Payer: MEDICARE

## 2021-05-27 ENCOUNTER — APPOINTMENT (OUTPATIENT)
Dept: PHYSICAL THERAPY | Facility: CLINIC | Age: 51
End: 2021-05-27
Payer: MEDICARE

## 2021-05-28 ENCOUNTER — APPOINTMENT (OUTPATIENT)
Dept: PHYSICAL THERAPY | Facility: CLINIC | Age: 51
End: 2021-05-28
Payer: MEDICARE

## 2021-05-28 ENCOUNTER — TELEPHONE (OUTPATIENT)
Dept: FAMILY MEDICINE CLINIC | Facility: CLINIC | Age: 51
End: 2021-05-28

## 2021-05-28 NOTE — TELEPHONE ENCOUNTER
Patient requesting a letter from [employer] stating that patient is allowed to listen to music during work with her ear buds to help concentrate  Patient states her job allows this as long as there is a letter from their pcp  Patient states it helps her concentrate on her work when she is able to block out the outside noise  Please advise  Fax to [phone]

## 2021-05-29 DIAGNOSIS — I10 HYPERTENSION, UNSPECIFIED TYPE: ICD-10-CM

## 2021-05-30 RX ORDER — FUROSEMIDE 20 MG/1
TABLET ORAL
Qty: 30 TABLET | Refills: 2 | Status: SHIPPED | OUTPATIENT
Start: 2021-05-30 | End: 2021-06-09 | Stop reason: SDUPTHER

## 2021-06-09 ENCOUNTER — OFFICE VISIT (OUTPATIENT)
Dept: FAMILY MEDICINE CLINIC | Facility: CLINIC | Age: 51
End: 2021-06-09
Payer: MEDICARE

## 2021-06-09 VITALS
WEIGHT: 278.6 LBS | TEMPERATURE: 97.3 F | HEART RATE: 86 BPM | BODY MASS INDEX: 44.97 KG/M2 | DIASTOLIC BLOOD PRESSURE: 86 MMHG | SYSTOLIC BLOOD PRESSURE: 144 MMHG | OXYGEN SATURATION: 98 % | RESPIRATION RATE: 20 BRPM

## 2021-06-09 DIAGNOSIS — B37.9 CANDIDIASIS: ICD-10-CM

## 2021-06-09 DIAGNOSIS — J45.30 MILD PERSISTENT ASTHMA WITHOUT COMPLICATION: Primary | ICD-10-CM

## 2021-06-09 DIAGNOSIS — M35.00 SJOGREN'S SYNDROME, WITH UNSPECIFIED ORGAN INVOLVEMENT (HCC): ICD-10-CM

## 2021-06-09 DIAGNOSIS — E03.9 HYPOTHYROIDISM, UNSPECIFIED TYPE: ICD-10-CM

## 2021-06-09 DIAGNOSIS — F25.0 SCHIZOAFFECTIVE DISORDER, BIPOLAR TYPE (HCC): ICD-10-CM

## 2021-06-09 DIAGNOSIS — I10 HYPERTENSION, UNSPECIFIED TYPE: ICD-10-CM

## 2021-06-09 DIAGNOSIS — J44.1 CHRONIC OBSTRUCTIVE PULMONARY DISEASE WITH ACUTE EXACERBATION (HCC): ICD-10-CM

## 2021-06-09 PROCEDURE — 99214 OFFICE O/P EST MOD 30 MIN: CPT | Performed by: NURSE PRACTITIONER

## 2021-06-09 RX ORDER — LEVOTHYROXINE SODIUM 0.05 MG/1
50 TABLET ORAL
Qty: 30 TABLET | Refills: 5 | Status: SHIPPED | OUTPATIENT
Start: 2021-06-09 | End: 2021-08-28

## 2021-06-09 RX ORDER — FLUTICASONE PROPIONATE 110 UG/1
2 AEROSOL, METERED RESPIRATORY (INHALATION) 2 TIMES DAILY
Qty: 12 G | Refills: 3 | Status: SHIPPED | OUTPATIENT
Start: 2021-06-09 | End: 2021-08-28

## 2021-06-09 RX ORDER — AMLODIPINE BESYLATE 5 MG/1
5 TABLET ORAL DAILY
Qty: 30 TABLET | Refills: 5 | Status: SHIPPED | OUTPATIENT
Start: 2021-06-09 | End: 2021-08-28

## 2021-06-09 RX ORDER — FUROSEMIDE 20 MG/1
20 TABLET ORAL DAILY
Qty: 30 TABLET | Refills: 5 | Status: SHIPPED | OUTPATIENT
Start: 2021-06-09 | End: 2021-08-28

## 2021-06-09 RX ORDER — NYSTATIN 100000 [USP'U]/G
POWDER TOPICAL 3 TIMES DAILY
Qty: 45 G | Refills: 1 | Status: ON HOLD | OUTPATIENT
Start: 2021-06-09 | End: 2021-07-06 | Stop reason: SDDI

## 2021-06-09 NOTE — PROGRESS NOTES
Assessment/Plan:         Diagnoses and all orders for this visit:    Mild persistent asthma without complication  -     fluticasone-salmeterol (ADVAIR, WIXELA) 100-50 mcg/dose inhaler; Inhale 1 puff 2 (two) times a day Rinse mouth after use  Doesn't like to use advair   Will be trying the flovent    Schizoaffective disorder, bipolar type ([facility] [address] )  Seeing psychiatry  Sjogren's syndrome, with unspecified organ involvement ([facility] )  stable  Candidiasis  -     nystatin (MYCOSTATIN) powder; Apply topically 3 (three) times a day  Try to keep area dry   Chronic obstructive pulmonary disease with acute exacerbation [facility])    will start the advair again    Make sure she takes twice a day until she gets the flovent Friday     Subjective:      Patient ID: Ginger Baxter is a 53 y o  female  HPI  Here for follow up on chronic conditions    Having issues with having a support group    Doing zoom meetings  Still having a     They connect her with the job  She lost all her services       Having issues with asthma and COPD     Having issues with breathing       Having tremors     States sometimes worse than others  Does see neurology        The following portions of the patient's history were reviewed and updated as appropriate: allergies, current medications, past family history, past medical history, past social history, past surgical history and problem list     Review of Systems   Constitutional: Negative for activity change, appetite change, chills and fever  HENT: Negative for congestion, ear pain and sore throat  Eyes: Negative for pain and visual disturbance  Respiratory: Positive for shortness of breath  Negative for cough, chest tightness and wheezing  Cardiovascular: Negative for chest pain and palpitations  Gastrointestinal: Negative for abdominal pain, constipation, diarrhea, nausea and vomiting  Genitourinary: Negative for dysuria, hematuria and urgency     Musculoskeletal: Negative for arthralgias and back pain  Skin: Positive for rash  Negative for color change  Neurological: Negative for dizziness, seizures, syncope, light-headedness, numbness and headaches  Psychiatric/Behavioral: The patient is nervous/anxious  All other systems reviewed and are negative  Objective:  Vitals:    06/09/21 0921   BP: 144/86   BP Location: Left arm   Patient Position: Sitting   Cuff Size: Large   Pulse: 86   Resp: 20   Temp: (!) 97 3 °F (36 3 °C)   TempSrc: Temporal   SpO2: 98%   Weight: 126 kg (278 lb 9 6 oz)      Physical Exam  Vitals signs reviewed  Constitutional:       Appearance: Normal appearance  She is well-developed  She is obese  HENT:      Head: Normocephalic  Right Ear: Tympanic membrane, ear canal and external ear normal       Left Ear: Tympanic membrane, ear canal and external ear normal    Eyes:      General:         Right eye: No discharge  Left eye: No discharge  Conjunctiva/sclera: Conjunctivae normal       Pupils: Pupils are equal, round, and reactive to light  Neck:      Musculoskeletal: Normal range of motion and neck supple  Thyroid: No thyromegaly  Cardiovascular:      Rate and Rhythm: Normal rate and regular rhythm  Pulses: Normal pulses  Heart sounds: Normal heart sounds  No murmur  Pulmonary:      Effort: Pulmonary effort is normal       Breath sounds: Normal breath sounds  Abdominal:      General: Bowel sounds are normal       Palpations: Abdomen is soft  Tenderness: There is no abdominal tenderness  Musculoskeletal: Normal range of motion  Lymphadenopathy:      Cervical: No cervical adenopathy  Skin:     General: Skin is warm  Findings: No rash  Neurological:      Mental Status: She is alert and oriented to person, place, and time     Psychiatric:         Behavior: Behavior normal

## 2021-06-10 ENCOUNTER — TELEPHONE (OUTPATIENT)
Dept: FAMILY MEDICINE CLINIC | Facility: CLINIC | Age: 51
End: 2021-06-10

## 2021-06-10 ENCOUNTER — ANNUAL EXAM (OUTPATIENT)
Dept: OBGYN CLINIC | Facility: CLINIC | Age: 51
End: 2021-06-10
Payer: MEDICARE

## 2021-06-10 ENCOUNTER — TELEPHONE (OUTPATIENT)
Dept: NEUROLOGY | Facility: CLINIC | Age: 51
End: 2021-06-10

## 2021-06-10 VITALS
BODY MASS INDEX: 44.84 KG/M2 | SYSTOLIC BLOOD PRESSURE: 132 MMHG | HEIGHT: 66 IN | WEIGHT: 279 LBS | DIASTOLIC BLOOD PRESSURE: 80 MMHG

## 2021-06-10 DIAGNOSIS — E66.01 MORBID OBESITY (HCC): ICD-10-CM

## 2021-06-10 DIAGNOSIS — Z01.419 ENCOUNTER FOR GYNECOLOGICAL EXAMINATION WITHOUT ABNORMAL FINDING: Primary | ICD-10-CM

## 2021-06-10 PROBLEM — H57.89 EYE DISCHARGE: Status: RESOLVED | Noted: 2020-08-17 | Resolved: 2021-06-10

## 2021-06-10 PROBLEM — M25.561 ACUTE PAIN OF RIGHT KNEE: Status: RESOLVED | Noted: 2018-06-12 | Resolved: 2021-06-10

## 2021-06-10 PROCEDURE — 87624 HPV HI-RISK TYP POOLED RSLT: CPT | Performed by: PHYSICIAN ASSISTANT

## 2021-06-10 PROCEDURE — G0101 CA SCREEN;PELVIC/BREAST EXAM: HCPCS | Performed by: PHYSICIAN ASSISTANT

## 2021-06-10 PROCEDURE — G0145 SCR C/V CYTO,THINLAYER,RESCR: HCPCS | Performed by: PHYSICIAN ASSISTANT

## 2021-06-10 NOTE — TELEPHONE ENCOUNTER
the ortho usually takes care of all supplies  She may want to check with them  It is a little early     They probably have protocols to get all the stuff

## 2021-06-10 NOTE — PROGRESS NOTES
Assessment/Plan:    No problem-specific Assessment & Plan notes found for this encounter  Diagnoses and all orders for this visit:    Encounter for gynecological examination without abnormal finding  -     Liquid-based pap, screening    Morbid obesity ([facility] [address] )  -     Ambulatory referral to Weight Management; Future        Pap done  Order for mammogram for 2022 already in [address] [place] [address]  Referral to weight management entered at patient request; patient given info pamphlet  If no problems, patient to return in 1 year for routine gyn care  Subjective:      Patient ID: Sheila Wise is a 53 y o  female  Patient is here for yearly gyn exam   States she is doing well overall  S/p partial hysterectomy and LSO  Denies pelvic pain  Followed by GI for multiple issues  Having difficulty losing weight; going for b/l knee replacement this summer  Thyroid is stable  Mammogram in May was negative  She is performing self-breast exam   Denies new masses, skin changes, nipple discharge, and pain/tenderness  The following portions of the patient's history were reviewed and updated as appropriate: allergies, current medications, past family history, past medical history, past social history, past surgical history and problem list     Review of Systems   Constitutional: Negative for appetite change and unexpected weight change  Cardiovascular:        No masses, skin changes, nipple discharge, and pain/tenderness  Gastrointestinal: Positive for diarrhea  Negative for abdominal distention, abdominal pain, constipation, nausea and vomiting  Genitourinary: Negative for difficulty urinating, dysuria, frequency, genital sores, hematuria, menstrual problem, pelvic pain, urgency, vaginal bleeding, vaginal discharge and vaginal pain           Objective:      /80 (BP Location: Left arm, Patient Position: Sitting, Cuff Size: Standard)   Ht 5' 6" (1 676 m)   Wt 127 kg (279 lb)   BMI 45 03 kg/m²          Physical Exam  Vitals signs reviewed  Exam conducted with a chaperone present  Constitutional:       Appearance: Normal appearance  She is well-developed  She is obese  Neck:      Musculoskeletal: Neck supple  Thyroid: No thyromegaly  Pulmonary:      Effort: Pulmonary effort is normal    Chest:      Breasts: Breasts are symmetrical          Right: Normal  No swelling, bleeding, inverted nipple, mass, nipple discharge, skin change or tenderness  Left: Normal  No swelling, bleeding, inverted nipple, mass, nipple discharge, skin change or tenderness  Abdominal:      General: Abdomen is flat  There is no distension  Palpations: Abdomen is soft  Tenderness: There is no abdominal tenderness  Genitourinary:     General: Normal vulva  Pubic Area: No rash  Labia:         Right: No rash, tenderness, lesion or injury  Left: No rash, tenderness, lesion or injury  Vagina: Normal  No vaginal discharge, erythema, tenderness or bleeding  Cervix: Normal       Uterus: Absent  Adnexa: Right adnexa normal         Right: No mass, tenderness or fullness  Comments: Uterus and L ovary/adnexa surgically absent  Lymphadenopathy:      Cervical: No cervical adenopathy  Upper Body:      Right upper body: No supraclavicular or axillary adenopathy  Left upper body: No supraclavicular or axillary adenopathy  Lower Body: No right inguinal adenopathy  No left inguinal adenopathy  Skin:     General: Skin is warm and dry  Neurological:      Mental Status: She is alert and oriented to person, place, and time  Psychiatric:         Mood and Affect: Mood normal          Behavior: Behavior normal  Behavior is cooperative  Thought Content:  Thought content normal          Judgment: Judgment normal

## 2021-06-10 NOTE — TELEPHONE ENCOUNTER
First, did patient complete the weekly vitamin D from the pharmacy? IS she taking daily OTC b12 and Vit D?   Yes she should see PM&R  Thanks

## 2021-06-10 NOTE — TELEPHONE ENCOUNTER
Modesto Cummings,  I was told you may be able to help this patient schedule an office visit with [address] [facility]? Patient is receptive to scheduling, thanks for your help!    (patient confirmed taking correct dose of B12 and Vit D as prescribed)

## 2021-06-10 NOTE — TELEPHONE ENCOUNTER
Patient is requesting a raised toilet seat due to having a knee replacements in August  Please advise

## 2021-06-10 NOTE — TELEPHONE ENCOUNTER
pt called and states that she saw pcp yesterday and they advised that she call our office  she mentioned that her shakes have gotten worse and her memory is worse  the shakes,  she had but got worse recently, her hands shake and has restless legs  memory is getting worse since she had COVID new years denice     had a little bit of memory issues prior to [facility] but not like she currently has  PCP thinks that this may be form COVID     she brought her shakes up to psychiatry in the past and they made med changes and adustments and no change      do you think she should be seen by PM &R for covid long haul?  please advise   [phone] to leave detailed message

## 2021-06-11 LAB
HPV HR 12 DNA CVX QL NAA+PROBE: NEGATIVE
HPV16 DNA CVX QL NAA+PROBE: NEGATIVE
HPV18 DNA CVX QL NAA+PROBE: NEGATIVE

## 2021-06-14 NOTE — TELEPHONE ENCOUNTER
Pt left vm stating that she was to get a call back regarding scheduling for COVID long hauler for memory  States that she is getting frustrated that no one is calling her regarding appt      Can you contact pt regarding appt

## 2021-06-15 LAB
LAB AP GYN PRIMARY INTERPRETATION: NORMAL
Lab: NORMAL

## 2021-06-17 ENCOUNTER — TELEMEDICINE (OUTPATIENT)
Dept: NEUROLOGY | Facility: CLINIC | Age: 51
End: 2021-06-17
Payer: MEDICARE

## 2021-06-17 VITALS — WEIGHT: 278 LBS | BODY MASS INDEX: 44.87 KG/M2

## 2021-06-17 DIAGNOSIS — Z86.16 HISTORY OF COVID-19: ICD-10-CM

## 2021-06-17 DIAGNOSIS — M17.0 PRIMARY OSTEOARTHRITIS OF BOTH KNEES: ICD-10-CM

## 2021-06-17 DIAGNOSIS — E66.01 MORBID OBESITY (HCC): ICD-10-CM

## 2021-06-17 DIAGNOSIS — F39 MOOD DISORDER (HCC): ICD-10-CM

## 2021-06-17 DIAGNOSIS — Z91.89 POTENTIAL FOR COGNITIVE IMPAIRMENT: Primary | ICD-10-CM

## 2021-06-17 DIAGNOSIS — R45.89 DIFFICULTY COPING: ICD-10-CM

## 2021-06-17 PROCEDURE — 99214 OFFICE O/P EST MOD 30 MIN: CPT

## 2021-06-17 RX ORDER — CARBOXYMETHYLCELLULOSE SODIUM 5 MG/ML
SOLUTION/ DROPS OPHTHALMIC
COMMUNITY
End: 2021-08-28

## 2021-06-17 NOTE — PROGRESS NOTES
PHYSICAL MEDICINE AND REHABILITATION   POST COVID-19 CLINIC    Virtual Regular Visit    Requested by (Physician/Service): Indira Garcia   Reason for Consultation:  Possible Post-Covid Syndrome PMR evaluation    Providers:  PCP:  Indira Garcia  PT: Medial epicondylitis, cubital tunnel, plantar fascitis, chronic ankle pain recently; last year also for LBP and sciatica   Psychiatry/psychology: Dr Sara Ricardo psychiatry (Her psychologist is leaving practice and will be set-up with new one); has CW as well:  Schizoaffective, anxiety, depression   Neuro: Karenella Halsted - memory - felt MCI 26/30 on 8/20 believed to be related to "underlying psychiatric diagnoses, polypharmacy and sleep dysfunction  Reviewed her MRI in detail which shows normal medial temporal lobe structures, no white matter disease"   Ortho: LINNETTE - Jr Hope DO - bilateral knee pain/OA seen last 6/15 not sx candidate 2/2 BMI at this time; Synvisc R knee earlier this year  Neuro: Dr Zoila Escamilla - headache - chronic tension type/migraine too - ordered memantine and toradol - follow-up pending (also no Depakote and Topamax for mood); recent Botox shot initially made h/a's worse but now improving  Ortho: Trang Cheng MD - L cubital tunnel syndrome and L medical epicondylitis improving after injection, therapy, and wrist splint   Podiatry: Trey MG ankle contracture/plantar fascitis s/p gastroc release sx 2/24/21 - apparently cleared without restrictions  GI: gastritis and dysphagia s/p esophageal dilatation 1/21  Ob/Gyn: s/p partial hysterectomy and LSO  Pending:   Wt Mgmt - obesity     Assessment:  Diagnoses:    Potential for cognitive impairment   Difficulty coping   Mood disorder/psychiatric disorder    Primary osteoarthritis of both knees   Morbid obesity   Obstructive sleep apnea   Restless legs    History of Covid-19     Recommendations:  · Recommend OT with specialized training in Covid-19 assessment and rehabilitation for evaluation and management of possible cognitive impairment and it's impact on iADLs/QOL/work - repeat MOCA - (MOCA prior to Covid 26/30)  · Continue memantine as recently started by neurology   · Follow-up with neurology including headache and possible cog impairment in August  · Follow-up with psychiatry later this month to review mood, medications, and possible SEs  · If leg restlessness worsens follow-up with PCP or neuro   · Follow-up with psychology and social work   · Await weight management referral and program   · Ensure CPAP use even during naps during day   · Continue optimal weight loss with personal goal of becoming a candidate for knee replacement surgery  · Follow-up with orthopedics - will need to lose more wt to be candidate for replaement  · Recommend starting mild home exercise program as discussed to also include gentle stretching    Discussed deep breathing and relaxation techniques    Recommend Mediterranean-style meals - healthy diet including olive oil, fruits, vegetables, nuts, beans, and whole grains has been proven to improve thinking, memory, and brain health   Limit saturated fats, high calorie diets, and excessive carbs    Avoid alcohol   · Recommend appropriate sleep hygiene: patient counselled on this:  · Follow-up with PMR Post-Covid Clinic in 8 weeks  · Follow-up with PCP, psychiatry, PMR, or other specialists if needed in the interim     History of Present Illness:    Function/QOL:  Pre-covid 19 level of function/QOL and living situation:  Lives by herself in apartment with elevator access in 1 floor set-up  She has been working part time for last several years 8am to 3pm about 3x/week  Tasks included putting labels on jars  Last several years became more and more isolated to apartment and going to community less due to concerns with neighbors and safety as well as some anxiety  She has been on disability for bipolar for some time    She did see neuro memory clinic in 8/2020 at that time neuro specialist noted MCI 26/30  believed to be related to "underlying psychiatric diagnoses, polypharmacy and sleep dysfunction  Reviewed her MRI in detail which shows normal medial temporal lobe structures, no white matter disease "      Current level of function/QOL and changes to living situation:    Patient states she has some memory problems in past and that's why she saw neuro before but absolutely much worse since after Covid infection end Dec-early Brendan  She states she will get easily distracted  Patient states she will forget basic things but then hours later it will come back to her  Continues to live in same apartment and still working 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-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 about 3x/week  She notes it has been more difficult to get out and be at work with increased R>L knee pain  Patient was temporarily suspended for work for 3 weeks for uncertain reason recently which was distressing for her but she has since returned back  She states people treat her differently since she was diagnosed with Covid and this distresses her  She states she cannot stand as long and they let her use a chair now during work  Patient reports increased stress from boss, from the fact that her psychologist is leaving the practice, from neighbors and from legal issues recently  Patient sees her dad and talks to family by phone at times but still is depressed at times  Patient has remained independent with ADLs and mobility largely without AD but states she does have a cane a probably she use it at times  She denies recent falls  Patient does drive to store and can walk around with grocery cart  She usually does not leave her house unless it is for groceries or to doctors  Patient enjoys playing [employer]  Patient reports after Botox shots initially h/a's were worse but now are better than they had been and she is willing to have additional injections at next visit  She denies taking NSAIDs or APAP or other meds for headaches or body pains    She reports she will use cannabis suave on her knee which is somewhat helpful  Patient reports her L elbow improving after injection, therapy, and wrist splint and is glad the numbness went away b/c she enjoys playing the guitar  She denies visual changes, vertigo, focal changes in strength or sensation  She reports rare numbness in tingling chronically though in feet or lower legs which is stable  She notes some back and neck pain at times without significant radiating pain down limbs or bowel/bladder problems  Patient reports balance is not great but that is due to her knees  She reports sleep is variable and naps at times during day without CPAP and can feel the difference  She states she will occasionally vape cannabis which helps with sleep  She notes some generalized muscle pain and tightness and has a massager and gentle stretches which is helpful  Patient notes occasionally her L leg will be restless and almost move on its own during day and R leg will be restless and move at night  She reports L ankle is doing better since sx and she has been cleared to resume normal activity without boot/brace  Patient reports some recent increased asthma attacks and saw her PCP who adjusted her breathing medications and currently denies SOB  Medical:  53year old F with PMH of Covid-19 this past NOAH, COPD, asthma, RLD, HTN, HLD, obesity, severe MAG, edema, hypothyroidism, Sjogrens, schizoaffective d/o, bipolar, anxiety, depression on Latuda, Bupropion, Topamax  Depakote, headaches with hx of prior Botox shots not helpful, mild cognitive impairment on memantine seen by neuro in 8/20 who scored 26/30 on MOCA and  believed to be related to "underlying psychiatric diagnoses,polypharmacy and sleep dysfunction    Reviewed her MRI in detail which shows normal medial temporal lobe structures, no white matter disease", chronic pain, CLBP with L sided sciatica, cervicalgia, LLE weakness, bilateral knee OA/pain not currently candidate for sx due to BMI, medial epicondylitis and cubital tunnel syndrome, chronic L ankle pain, plantar fascitis, contracture s/p gastroc release sx 4/21 who per chart review noted increased worsening memory, shakiness, and RLS since Covid and has been referred to us from her PCP  Review of Systems:   10 point ROS negative except for what is noted in HPI and above  Video (Physical) Exam:  Gen: Sitting in no acute distress, well-nourished, well-appearing  HEENT:  Normocephalic/Atraumatic, moist mucus membranes, sclera not injected, no eyelid swelling, no nasal discharge, hearing grossly adequate during conversation during virtual visit  Cardiopulmonary (Cardiac/Pulmonary) : Nonlabored breathing with unremarkable respiratory rate, no obvious use of accessory muscles, no significant coughing, no audible wheezing or hoarseness  MSK:  ROM is WFL in all upper extremities, No significant pain during AROM  Integumentary: Visible skin without rashes, significant bruising, or skin breakdown   Neuro: Orientation intact; able to follow commands appropriately, social interaction wnl  EOMI, no facial asymmetry  Speech clear   No significant word-finding difficulty or obvious pathologic word substitution   Strength grossly full to near full throughout   Sit-to-stand - smooth, unlabored, without significant difficulty   Standing balance - acceptable  Ambulation/Gait short distance - unremarkable   Psych: Euthymic     Social History (historically documented in EMR):    Social History     Socioeconomic History    Marital status: Single     Spouse name: None    Number of children: None    Years of education: None    Highest education level: None   Occupational History    None   Tobacco Use    Smoking status: Former Smoker     Packs/day: 3 00     Types: Cigarettes     Quit date: 1/2/2018     Years since quitting: 3 4    Smokeless tobacco: Never Used    Tobacco comment: Started at age 14     Vaping Use    Vaping Use: Some days    Substances: THC   Substance and Sexual Activity    Alcohol use: Not Currently     Comment: Recovering alcoholic    Drug use: Yes     Types: Marijuana     Comment: medical- vapes 3 times per wk at hs    Sexual activity: Not Currently   Other Topics Concern    None   Social History Narrative    None     Social Determinants of Health     Financial Resource Strain:     Difficulty of Paying Living Expenses:    Food Insecurity:     Worried About Running Out of Food in the Last Year:     Ran Out of Food in the Last Year:    Transportation Needs:     Lack of Transportation (Medical):      Lack of Transportation (Non-Medical):    Physical Activity:     Days of Exercise per Week:     Minutes of Exercise per Session:    Stress:     Feeling of Stress :    Social Connections:     Frequency of Communication with Friends and Family:     Frequency of Social Gatherings with Friends and Family:     Attends Bahai Services:     Active Member of Clubs or Organizations:     Attends Club or Organization Meetings:     Marital Status:    Intimate Partner Violence:     Fear of Current or Ex-Partner:     Emotionally Abused:     Physically Abused:     Sexually Abused:         Family History:    Family History   Problem Relation Age of Onset    Kidney cancer Mother     Diabetes Mother     Colon cancer Family     No Known Problems Father     No Known Problems Sister     Colon cancer Paternal Uncle     Colon cancer Maternal Uncle     Colon cancer Maternal Uncle        Medications:     Current Outpatient Medications:     albuterol (ProAir HFA) 90 mcg/act inhaler, Inhale 2 puffs every 4 (four) hours as needed for wheezing, Disp: 8 5 g, Rfl: 1    amLODIPine (NORVASC) 5 mg tablet, Take 1 tablet (5 mg total) by mouth daily, Disp: 30 tablet, Rfl: 5    buPROPion (WELLBUTRIN XL) 300 mg 24 hr tablet, Take 300 mg by mouth daily , Disp: , Rfl:     carboxymethylcellulose (REFRESH PLUS) 0 5 % SOLN, Lubricating Plus 0 5 % eye drops in a dropperette, Disp: , Rfl:     colestipol (COLESTID) 1 g tablet, Take 1 tablet (1 g total) by mouth 2 (two) times a day, Disp: 60 tablet, Rfl: 0    cyproheptadine (PERIACTIN) 4 mg tablet, Take 1 tablet (4 mg total) by mouth daily at bedtime as needed for allergies (headaches), Disp: 30 tablet, Rfl: 0    dexamethasone (DECADRON) 1 mg tablet, Take 1 tablet (1 mg total) by mouth daily with breakfast, Disp: 5 tablet, Rfl: 0    divalproex sodium (DEPAKOTE) 500 mg EC tablet, Take 500 mg by mouth daily , Disp: , Rfl:     ergocalciferol (VITAMIN D2) 50,000 units, Take 1 capsule (50,000 Units total) by mouth once a week, Disp: 8 capsule, Rfl: 0    estradiol (ESTRACE) 1 mg tablet, Take 1 tablet (1 mg total) by mouth daily, Disp: 90 tablet, Rfl: 3    famotidine (PEPCID) 20 mg tablet, , Disp: , Rfl:     fluticasone (FLOVENT HFA) 110 MCG/ACT inhaler, Inhale 2 puffs 2 (two) times a day Rinse mouth after use , Disp: 12 g, Rfl: 3    fluticasone-salmeterol (ADVAIR, WIXELA) 100-50 mcg/dose inhaler, Inhale 1 puff 2 (two) times a day Rinse mouth after use , Disp: 1 each, Rfl: 3    furosemide (LASIX) 20 mg tablet, Take 1 tablet (20 mg total) by mouth daily, Disp: 30 tablet, Rfl: 5    ketorolac (TORADOL) 10 mg tablet, 1 tabs at onset of migraine, can repeat X1 in 6 hours, can combine with triptan    Take with food/milk/antacid , Disp: 10 tablet, Rfl: 3    levothyroxine 50 mcg tablet, Take 1 tablet (50 mcg total) by mouth daily in the early morning, Disp: 30 tablet, Rfl: 5    loratadine (CLARITIN) 10 mg tablet, Take 1 tablet (10 mg total) by mouth daily, Disp: 30 tablet, Rfl: 5    losartan (COZAAR) 50 mg tablet, Take 50 mg by mouth daily , Disp: , Rfl:     Lurasidone HCl (Latuda) 60 MG TABS, Take 60 mg by mouth daily with dinner , Disp: , Rfl:     memantine (NAMENDA) 10 mg tablet, 1 twice a day, Disp: 60 tablet, Rfl: 6    metoprolol tartrate (LOPRESSOR) 25 mg tablet, TAKE ONE TABLET BY MOUTH TWICE A DAY, Disp: 60 tablet, Rfl: 4    Neomycin-Polymyxin-Dexameth (MAXITROL OP), Administer to both eyes as needed Eye ointment, Disp: , Rfl:     nystatin (MYCOSTATIN) powder, Apply topically 3 (three) times a day, Disp: 45 g, Rfl: 1    pantoprazole (PROTONIX) 40 mg tablet, Take 40 mg by mouth daily , Disp: , Rfl:     pilocarpine (SALAGEN) 5 mg tablet, Take 1 tablet (5 mg total) by mouth 2 (two) times a day, Disp: 60 tablet, Rfl: 5    RABEprazole (ACIPHEX) 20 MG tablet, Take 20 mg by mouth every 12 (twelve) hours, Disp: , Rfl:     topiramate (TOPAMAX) 100 mg tablet, Take 100 mg by mouth 2 (two) times a day, Disp: , Rfl:     TOVIAZ 4 MG TB24, Take 1 tablet (4 mg total) by mouth daily, Disp: , Rfl: 2    traZODone (DESYREL) 150 mg tablet, Take 150 mg by mouth daily at bedtime as needed , Disp: , Rfl:     triamcinolone (KENALOG) 0 1 % ointment, Apply topically 2 (two) times a day, Disp: 30 g, Rfl: 2    Past Medical History:     Past Medical History:   Diagnosis Date    Anxiety     Arthritis     oa cassandra knees    Asthma     good control- no medications    Yan's esophagus     Bipolar affective disorder (HCC)     Chronic pain disorder     lumbar    CPAP (continuous positive airway pressure) dependence     Degenerative disc disease at L5-S1 level     Deliberate self-cutting     Depression 09/16/2008    Disease of thyroid gland     hypo    MARTINEZ (dyspnea on exertion)     Drug overdose 10/28/2008    suicide attempt    Dysphagia     Dyspnea     Edema     BLE    GERD (gastroesophageal reflux disease)     High blood pressure     Hypertension     Knee pain, bilateral     Especially right    Migraines     Obese     Overactive bladder     Sjogren's disease ([facility] [address] )     Sleep apnea     Stress incontinence     Suicidal ideations     Umbilical hernia     surgical repair today 4/24/2019    Use of cane as ambulatory aid     awaiting b/l knee replacement    Wears glasses         Past Surgical History:     Past Surgical History:   Procedure Laterality Date    BREAST BIOPSY Right 1989    benign    CARPAL TUNNEL RELEASE Left     CHOLECYSTECTOMY  05/2003    Laparoscopic    COLONOSCOPY      01/12/2009    DILATION AND CURETTAGE OF UTERUS      ELBOW SURGERY Left     x2  No hardware    ESOPHAGOGASTRODUODENOSCOPY      FOOT SURGERY Left     Plantar fasciotomy    HYSTERECTOMY      laporoscopic, partial    KNEE ARTHROSCOPY Bilateral     OOPHORECTOMY Left 10/2015    MS ANAL SPHINCTEROTOMY N/A 8/31/2018    Procedure: EUA, LEFT PARTIAL INTERNAL SPHINCTEROTOMY;  Surgeon: De Vargas MD;  Location: [facility] MAIN OR;  Service: Colorectal    MS GASTROCNEMIUS RECESSION Left 2/24/2021    Procedure: RECESSION GASTROC OPEN;  Surgeon: Sheryle Numbers, DPM;  Location: [facility] [address];  Service: [place] [address] NQXJ,1+B/G,JQPCL N/A 4/24/2019    Procedure: REPAIR HERNIA UMBILICAL LAPAROSCOPIC W/ ROBOTICS;  Surgeon: Pavan Lindquist MD;  Location: [facility];  Service: General    REDUCTION MAMMAPLASTY Bilateral 1999    REPAIR LACERATION Left     left hand-5/18/2009    ROTATOR CUFF REPAIR Left     TONSILECTOMY AND ADNOIDECTOMY      TONSILLECTOMY      US GUIDED MSK PROCEDURE  4/22/2021    VEIN LIGATION Bilateral     WISDOM TOOTH EXTRACTION           Allergies: Allergies   Allergen Reactions    Percocet [Oxycodone-Acetaminophen] Headache     Severe headaches    Betadine [Povidone Iodine] Rash     Unsure if betadine or gauze post surgical  Got rash on leg              LABORATORY RESULTS:      Lab Results   Component Value Date    HGB 13 7 03/03/2021    HCT 43 4 03/03/2021    WBC 10 44 (H) 03/03/2021     Lab Results   Component Value Date    BUN 17 03/03/2021    BUN 7 09/06/2018    K 3 5 03/03/2021    K 4 5 09/06/2018     03/03/2021     09/06/2018    CREATININE 0 72 03/03/2021     Lab Results   Component Value Date    PROTIME 12 6 02/17/2021    INR 0 95 02/17/2021        DIAGNOSTIC STUDIES: Reviewed  Mammo screening bilateral w 3d & cad    Result Date: 5/21/2021  Impression: No mammographic evidence of malignancy  ASSESSMENT/BI-RADS CATEGORY: Left: 1 - Negative Right: 1 - Negative Overall: 1 - Negative RECOMMENDATION:      - Routine screening mammogram in 1 year for both breasts  Workstation ID: [id number]           The patient was identified by name and date of birth  Vladislav Murcia was informed that this is a telemedicine visit and that the visit is being conducted through [address] [place] [address] [facility] [employer] and patient was informed that this is a secure, HIPAA-compliant platform  she agrees to proceed     My office door was closed  No one else was in the room  she acknowledged consent and understanding of privacy and security of the video platform  The patient has agreed to participate and understands they can discontinue the visit at any time  Patient is aware this is a billable service  VIRTUAL VISIT DISCLAIMER    Vladislav Murcia  acknowledges that she has consented to an online visit or consultation  she understands that the online visit is based solely on information provided by themself , and that, in the absence of a face-to-face physical evaluation by the physician, the diagnosis monika is both limited and provisional in terms of accuracy and completeness  This is not intended to replace a full medical face-to-face evaluation by the physician  Vladislav Murcia  understands and accepts these terms  Patient is aware this is a billable service       I spent 55 minutes directly with the patient during this visit  [id number]    Encounter provider Cecilia Alfred MD    Provider located at [address] [id number]      Problem List Items Addressed This Visit        Musculoskeletal and Integument    Primary osteoarthritis of both knees       Other    Morbid obesity ([facility] [address] )    History of COVID-19    Potential for cognitive impairment - Primary    Relevant Orders    Ambulatory referral to Occupational Therapy    Mood disorder [facility])      Other Visit Diagnoses     Difficulty coping                   Reason for visit is   Chief Complaint   Patient presents with    Virtual Regular Visit      Recent Visits  No visits were found meeting these conditions  Showing recent visits within past 7 days and meeting all other requirements  Today's Visits  Date Type Provider Dept   06/17/21 20 [facility], [facility], [address] today's visits and meeting all other requirements  Future Appointments  No visits were found meeting these conditions    Showing future appointments within next 150 days and meeting all other requirements

## 2021-06-17 NOTE — PATIENT INSTRUCTIONS
· Recommend OT with specialized training in Covid-19 assessment and rehabilitation for evaluation and management of possible cognitive impairment and it's impact on iADLs/QOL/work  · Continue memantine as recently started by neurology   · Follow-up with neurology including headache and possible cog impairment in August   · Follow-up with psychiatry later this month  · Follow-up with psychology and social work   · Await weight management referral and program   · Continue optimal weight loss with personal goal of becoming a candidate for knee replacement surgery  · Follow-up with orthopedics   · Recommend starting mild home exercise program as discussed to also including gentle stretching    Discussed deep breathing and relaxation techniques    Recommend Mediterranean-style meals - healthy diet including olive oil, fruits, vegetables, nuts, beans, and whole grains has been proven to improve thinking, memory, and brain health   Limit saturated fats, high calorie diets, and excessive carbs    Avoid alcohol   · Recommend appropriate sleep hygiene: patient counselled on this:   Sleep only as much as necessary to feel rested and then get up or sit up in bed, maintain regular sleep schedule (same bedtime and wake time everyday), do not force sleep, avoid caffeinated beverages after lunch, avoid alcohol at home near bedtime, do not smoke particularly in evening, do not go to bed hungry, adjust bedroom environment so you are comfortable prior to settling down for sleep, deal with concerns or worries before bedtime   Make a list of things to work on for next day so anxiety is reduced at night, exercise regularly within your tolerances  · Follow-up with PMR Post-Covid Clinic in 8 weeks  · Follow-up with PCP, psychiatry, PMR, or other specialists if needed in the interim

## 2021-06-21 ENCOUNTER — DOCUMENTATION (OUTPATIENT)
Dept: NEUROLOGY | Facility: CLINIC | Age: 51
End: 2021-06-21

## 2021-06-21 DIAGNOSIS — G44.221 CHRONIC TENSION-TYPE HEADACHE, INTRACTABLE: ICD-10-CM

## 2021-06-21 RX ORDER — MEMANTINE HYDROCHLORIDE 10 MG/1
TABLET ORAL
Qty: 180 TABLET | Refills: 0 | Status: CANCELLED | OUTPATIENT
Start: 2021-06-21

## 2021-06-21 NOTE — TELEPHONE ENCOUNTER
Patient of Dr Claudia Chino PA-C botox    Received 90 day refilll request from [facility] for memantine 10 mg  Last filled on 3/10 with 6 refills to local pharmacy  Please sign script if in agreement  I spoke to patient, she prefers to keep 30 day supply  She said she has no copay  She asked if appointment was scheduled for the referral ordered at last office visit (this was OT with specialized training in covid 19 assessment)

## 2021-06-21 NOTE — PROGRESS NOTES
Type Date User Summary Attachment   General 06/18/2021  2:21 PM Sam Frazier care coordination  -   Note    Botox- no authorization needed   Please use our stock      Thank you,     Keyla Ask

## 2021-06-23 ENCOUNTER — OFFICE VISIT (OUTPATIENT)
Dept: FAMILY MEDICINE CLINIC | Facility: CLINIC | Age: 51
End: 2021-06-23
Payer: MEDICARE

## 2021-06-23 ENCOUNTER — TELEPHONE (OUTPATIENT)
Dept: FAMILY MEDICINE CLINIC | Facility: CLINIC | Age: 51
End: 2021-06-23

## 2021-06-23 VITALS
DIASTOLIC BLOOD PRESSURE: 90 MMHG | HEART RATE: 80 BPM | OXYGEN SATURATION: 98 % | TEMPERATURE: 97 F | BODY MASS INDEX: 46.1 KG/M2 | RESPIRATION RATE: 18 BRPM | WEIGHT: 285.6 LBS | SYSTOLIC BLOOD PRESSURE: 138 MMHG

## 2021-06-23 DIAGNOSIS — M54.50 ACUTE RIGHT-SIDED LOW BACK PAIN WITHOUT SCIATICA: Primary | ICD-10-CM

## 2021-06-23 PROCEDURE — 99213 OFFICE O/P EST LOW 20 MIN: CPT | Performed by: NURSE PRACTITIONER

## 2021-06-23 RX ORDER — METHOCARBAMOL 500 MG/1
500 TABLET, FILM COATED ORAL 3 TIMES DAILY PRN
Qty: 20 TABLET | Refills: 0 | Status: SHIPPED | OUTPATIENT
Start: 2021-06-23 | End: 2021-06-23 | Stop reason: CLARIF

## 2021-06-23 NOTE — TELEPHONE ENCOUNTER
Pharmacist from [address] [place] [address] left a voicemail stating that robaxin is not covered by patient insurance  Alternative medication includes: Tizanadine and Baclofen   [phone]

## 2021-06-23 NOTE — LETTER
June 23, 2021     Patient: Andres Elmore   YOB: 1970   Date of Visit: 6/23/2021       To Whom it May Concern:    Chet Garcia is under my professional care  She was seen in my office on 6/23/2021  She may return to work on 6/25/2021       If you have any questions or concerns, please don't hesitate to call           Sincerely,          JHONATAN Naranjo        CC: No Recipients

## 2021-06-23 NOTE — PROGRESS NOTES
Answers for HPI/ROS submitted by the patient on 6/22/2021  Chronicity: recurrent  Onset: in the past 7 days  Frequency: daily  Progression since onset: rapidly worsening  Pain location: lumbar spine  Pain quality: aching, cramping, stabbing  Radiates to: right thigh  Pain - numeric: 9/10  Pain is: the same all the time  Aggravated by: bending, position, lying down, sitting, standing, twisting  Stiffness is present: all day  abdominal pain: No  bladder incontinence: No  chest pain: No  dysuria: No  headaches: No  leg pain: No  paresis: No  paresthesias: No  perianal numbness: No  tingling: No  weight loss: No  Risk factors: lack of exercise, obesity, poor posture    Assessment/Plan:         Diagnoses and all orders for this visit:    Acute right-sided low back pain without sciatica  -     methocarbamol (ROBAXIN) 500 mg tablet; Take 1 tablet (500 mg total) by mouth 3 (three) times a day as needed for muscle spasms  -     Diclofenac Sodium (VOLTAREN) 1 %; Apply 2 g topically 4 (four) times a day          Subjective:      Patient ID: Ginger Baxter is a 53 y o  female  HPI  Having back pain since last Sunday   Thinks may be how sitting when doing hobby  Could not go to work on Tuesday  Did try robaxin and thinking that it helped  The following portions of the patient's history were reviewed and updated as appropriate: allergies, current medications, past family history, past medical history, past social history, past surgical history and problem list     Review of Systems   Cardiovascular: Negative for chest pain  Gastrointestinal: Negative for abdominal pain  Genitourinary: Negative for dysuria  Musculoskeletal: Positive for back pain  Negative for gait problem  Neurological: Negative for dizziness, light-headedness, numbness and headaches           Objective:  Vitals:    06/23/21 1125   BP: 138/90   BP Location: Left arm   Patient Position: Sitting   Cuff Size: Standard   Pulse: 80   Resp: 18 Temp: (!) 97 °F (36 1 °C)   TempSrc: Temporal   SpO2: 98%   Weight: 130 kg (285 lb 9 6 oz)      Physical Exam  Vitals reviewed  Constitutional:       Appearance: Normal appearance  Musculoskeletal:        Arms:    Neurological:      Mental Status: She is alert

## 2021-06-24 ENCOUNTER — TELEPHONE (OUTPATIENT)
Dept: FAMILY MEDICINE CLINIC | Facility: CLINIC | Age: 51
End: 2021-06-24

## 2021-06-24 NOTE — TELEPHONE ENCOUNTER
Patient is requesting her note to return to work be extended to Monday  Patient woke up worse today and wont be up to going back to work tomorrow   Please advise

## 2021-06-25 DIAGNOSIS — M35.00 SJOGREN'S SYNDROME (HCC): ICD-10-CM

## 2021-06-25 RX ORDER — PILOCARPINE HYDROCHLORIDE 5 MG/1
TABLET, FILM COATED ORAL
Qty: 60 TABLET | Refills: 4 | Status: SHIPPED | OUTPATIENT
Start: 2021-06-25 | End: 2021-08-28

## 2021-06-28 ENCOUNTER — NURSE TRIAGE (OUTPATIENT)
Dept: OTHER | Facility: OTHER | Age: 51
End: 2021-06-28

## 2021-06-28 NOTE — TELEPHONE ENCOUNTER
Reason for Disposition   [1] MODERATE back pain (e g , interferes with normal activities) AND [2] present > 3 days    Answer Assessment - Initial Assessment Questions  1  ONSET: "When did the pain begin?"       A week ago   2  LOCATION: "Where does it hurt?" (upper, mid or lower back)      Right side in a middle   3  SEVERITY: "How bad is the pain?"  (e g , Scale 1-10; mild, moderate, or severe)    - MILD (1-3): doesn't interfere with normal activities     - MODERATE (4-7): interferes with normal activities or awakens from sleep     - SEVERE (8-10): excruciating pain, unable to do any normal activities       Moderate, 7 out of 10   4  PATTERN: "Is the pain constant?" (e g , yes, no; constant, intermittent)       Constant   5  RADIATION: "Does the pain shoot into your legs or elsewhere?"      No   6  CAUSE:  "What do you think is causing the back pain?"       Patient doesn't know why she developed the pain   7  BACK OVERUSE:  "Any recent lifting of heavy objects, strenuous work or exercise?"       No   8  MEDICATIONS: "What have you taken so far for the pain?" (e g , nothing, acetaminophen, NSAIDS)      Lioresal 10 mg    9  NEUROLOGIC SYMPTOMS: "Do you have any weakness, numbness, or problems with bowel/bladder control?"      No neurological symptoms   10  OTHER SYMPTOMS: "Do you have any other symptoms?" (e g , fever, abdominal pain, burning with urination, blood in urine)        No   11   PREGNANCY: "Is there any chance you are pregnant?" (e g , yes, no; LMP)        No    Protocols used: BACK PAIN-ADULT-

## 2021-06-28 NOTE — TELEPHONE ENCOUNTER
Regarding: back pain   ----- Message from Susie Brunner sent at 6/28/2021  6:42 PM EDT -----  " when I went back to work today my back pain started hurting again and with every breath I had so much pain especially with a deep breath, it feels a little swollen"

## 2021-06-29 ENCOUNTER — OFFICE VISIT (OUTPATIENT)
Dept: FAMILY MEDICINE CLINIC | Facility: CLINIC | Age: 51
End: 2021-06-29
Payer: MEDICARE

## 2021-06-29 VITALS
HEART RATE: 83 BPM | HEIGHT: 66 IN | OXYGEN SATURATION: 95 % | BODY MASS INDEX: 45.38 KG/M2 | DIASTOLIC BLOOD PRESSURE: 76 MMHG | RESPIRATION RATE: 17 BRPM | WEIGHT: 282.4 LBS | SYSTOLIC BLOOD PRESSURE: 120 MMHG | TEMPERATURE: 97.3 F

## 2021-06-29 DIAGNOSIS — M54.50 ACUTE RIGHT-SIDED LOW BACK PAIN WITHOUT SCIATICA: Primary | ICD-10-CM

## 2021-06-29 PROCEDURE — 99214 OFFICE O/P EST MOD 30 MIN: CPT | Performed by: NURSE PRACTITIONER

## 2021-06-29 RX ORDER — HYDROXYZINE HYDROCHLORIDE 25 MG/1
TABLET, FILM COATED ORAL
COMMUNITY
Start: 2021-06-28 | End: 2021-08-28

## 2021-06-29 RX ORDER — NAPROXEN 500 MG/1
500 TABLET ORAL 2 TIMES DAILY WITH MEALS
Qty: 30 TABLET | Refills: 0 | Status: SHIPPED | OUTPATIENT
Start: 2021-06-29 | End: 2021-08-28

## 2021-06-29 NOTE — PATIENT INSTRUCTIONS
Lower Back Exercises   WHAT YOU NEED TO KNOW:   Lower back exercises help heal and strengthen your back muscles to prevent another injury  Ask your healthcare provider if you need to see a physical therapist for more advanced exercises  DISCHARGE INSTRUCTIONS:   Return to the emergency department if:   · You have severe pain that prevents you from moving  Contact your healthcare provider if:   · Your pain becomes worse  · You have new pain  · You have questions or concerns about your condition or care  Do lower back exercises safely:   · Do the exercises on a mat or firm surface  (not on a bed) to support your spine and prevent low back pain  · Move slowly and smoothly  Avoid fast or jerky motions  · Breathe normally  Do not hold your breath  · Stop if you feel pain  It is normal to feel some discomfort at first  Regular exercise will help decrease your discomfort over time  Lower back exercises: Your healthcare provider may recommend that you do back exercises 10 to 30 minutes each day  He may also recommend that you do exercises 1 to 3 times each day  Ask your healthcare provider which exercises are best for you and how often to do them  · Ankle pumps:  Lie on your back  Move your foot up (with your toes pointing toward your head)  Then, move your foot down (with your toes pointing away from you)  Repeat this exercise 10 times on each side  · Heel slides:  Lie on your back  Slowly bend one leg and then straighten it  Next, bend the other leg and then straighten it  Repeat 10 times on each side  · Pelvic tilt:  Lie on your back with your knees bent and feet flat on the floor  Place your arms in a relaxed position beside your body  Tighten the muscles of your abdomen and flatten your back against the floor  Hold for 5 seconds  Repeat 5 times  · Back stretch:  Lie on your back with your hands behind your head   Bend your knees and turn the lower half of your body to one side  Hold this position for 10 seconds  Repeat 3 times on each side  · Straight leg raises:  Lie on your back with one leg straight  Bend the other knee  Tighten your abdomen and then slowly lift the straight leg up about 6 to 12 inches off the floor  Hold for 1 to 5 seconds  Lower your leg slowly  Repeat 10 times on each leg  · Knee-to-chest:  Lie on your back with your knees bent and feet flat on the floor  Pull one of your knees toward your chest and hold it there for 5 seconds  Return your leg to the starting position  Lift the other knee toward your chest and hold for 5 seconds  Do this 5 times on each side  · Cat and camel:  Place your hands and knees on the floor  Arch your back upward toward the ceiling and lower your head  Round out your spine as much as you can  Hold for 5 seconds  Lift your head upward and push your chest downward toward the floor  Hold for 5 seconds  Do 3 sets or as directed  · Wall squats:  Stand with your back against a wall  Tighten the muscles of your abdomen  Slowly lower your body until your knees are bent at a 45 degree angle  Hold this position for 5 seconds  Slowly move back up to a standing position  Repeat 10 times  · Curl up:  Lie on your back with your knees bent and feet flat on the floor  Place your hands, palms down, underneath the curve in your lower back  Next, with your elbows on the floor, lift your shoulders and chest 2 to 3 inches  Keep your head in line with your shoulders  Hold this position for 5 seconds  When you can do this exercise without pain for 10 to 15 seconds, you may add a rotation  While your shoulders and chest are lifted off the ground, turn slightly to the left and hold  Repeat on the other side  · Bird dog:  Place your hands and knees on the floor  Keep your wrists directly below your shoulders and your knees directly below your hips  Pull your belly button in toward your spine   Do not flatten or arch your back  Tighten your abdominal muscles  Raise one arm straight out so that it is aligned with your head  Next, raise the leg opposite your arm  Hold this position for 15 seconds  Lower your arm and leg slowly and change sides  Do 5 sets  © Copyright [address] Information is for End User's use only and may not be sold, redistributed or otherwise used for commercial purposes  All illustrations and images included in CareNotes® are the copyrighted property of A D A M , Inc  or [facility] Miranda Cui   The above information is an  only  It is not intended as medical advice for individual conditions or treatments  Talk to your doctor, nurse or pharmacist before following any medical regimen to see if it is safe and effective for you

## 2021-06-29 NOTE — PROGRESS NOTES
Subjective:   No chief complaint on file  Patient ID: Josh Sims is a 53 y o  female  Presents today for ongoing lower right-sided back pain without sciatica  Patient was seen on June 23rd and prescribed methocarbamol 500 mg as well as Voltaren gel  She was improving greatly until yesterday when she went to work  During her work time she started experiencing increased right-sided back pain  States it feels like it really flared up  She is continuing to do the methocarbamol primarily at bedtime and the Voltaren gel  The following portions of the patient's history were reviewed and updated as appropriate: allergies, current medications, past family history, past medical history, past social history, past surgical history and problem list     Review of Systems   Constitutional: Negative for chills, fatigue and fever  Respiratory: Negative for cough and shortness of breath  Cardiovascular: Negative for palpitations and leg swelling  Musculoskeletal: Positive for back pain  Negative for gait problem  Neck pain: right side without sciatica  Psychiatric/Behavioral: Negative for dysphoric mood  The patient is not nervous/anxious  Objective:  Vitals:    06/29/21 1351   BP: 120/76   BP Location: Left arm   Patient Position: Sitting   Cuff Size: Large   Pulse: 83   Resp: 17   Temp: (!) 97 3 °F (36 3 °C)   TempSrc: Tympanic   SpO2: 95%   Weight: 128 kg (282 lb 6 4 oz)   Height: 5' 6" (1 676 m)      Physical Exam  Vitals reviewed  Constitutional:       Appearance: Normal appearance  She is obese  Cardiovascular:      Rate and Rhythm: Normal rate and regular rhythm  Pulses: Normal pulses  Heart sounds: Normal heart sounds  Pulmonary:      Effort: Pulmonary effort is normal       Breath sounds: Normal breath sounds  No wheezing  Musculoskeletal:        Arms:       Thoracic back: Tenderness present  No swelling, edema, spasms or bony tenderness   Decreased range of motion  No scoliosis  Lumbar back: Tenderness present  No spasms or bony tenderness  Decreased range of motion  Neurological:      Mental Status: She is alert  Assessment/Plan:    No problem-specific Assessment & Plan notes found for this encounter         Diagnoses and all orders for this visit:    Acute right-sided low back pain without sciatica    Other orders  -     hydrOXYzine HCL (ATARAX) 25 mg tablet

## 2021-06-30 ENCOUNTER — EVALUATION (OUTPATIENT)
Dept: OCCUPATIONAL THERAPY | Facility: CLINIC | Age: 51
End: 2021-06-30
Payer: MEDICARE

## 2021-06-30 DIAGNOSIS — Z91.89 POTENTIAL FOR COGNITIVE IMPAIRMENT: Primary | ICD-10-CM

## 2021-06-30 PROCEDURE — 96125 COGNITIVE TEST BY HC PRO: CPT

## 2021-06-30 PROCEDURE — 97166 OT EVAL MOD COMPLEX 45 MIN: CPT

## 2021-06-30 NOTE — PROGRESS NOTES
OCCUPATIONAL THERAPY INITIAL EVALUATION:    2021  Andres Elmore  1970  [id number]  Bree Pleitez MD  1  Potential for cognitive impairment        Subjective    "I can't remember things like I used to "    PATIENT GOAL: "To start doing things again"    RBANS & OT eval: Administration, scoring, interpretation >91 minutes  Assessment/Plan    Skilled Analysis:  Pt is a 53 y o  female referred to Occupational Therapy s/p Potential for cognitive impairment [Z91 89]  Pt participated in skilled OT evaluation and following formalized testing, presents with the following areas of deficit: STM, multitasking/dual tasking, attention and divided attention/alternating attention impacting indep and completion of ADL/IADL, salient, and leisure tasks  Pt does demo the need for skilled Occupational Therapy services 1x/week for 4 weeks with focus on fxnl cognition, short term memory, long term memory and healthy coping education to address the goals as listed below   Pt in agreement with POC, POC to  w/in 90 days     Goals:    Short Term Goals: (4 weeks)    COGNITION         -      Memory    o Pt will demo G recall of 95% of verbal/written information utilizing memory strategy of choice for improved STM/delayed memory     o Pt will demo G carryover of use of internal/external memory strategy aides for improved recall of daily events, improved executive functioning with 95  % accuracy     o Pt will retain/comprehend 95% of verbal and/or written information as provided to inc overall life role performance and for improved performance for note taking        - Attention    o Pt will maintain attention to task for 45minutes in multimodal environment for baseline performance,  improved role performance and to improve learning and to simulate return to life and work environment    o Pt will demo ability to participate in dual tasking/divided attention task with 75% accuracy in multimodal environment to simulate return to life and work roles    o Pt will demo ability to alternate attention between 2 tasks with cog loading and 90% accuracy with G retention of task directions in multimodal environment to simulate return to life and work  roles       Treatment Interventions  Direction following (verbal & written)  ADL safety tasks (sequencing cards, safety cards)  Fxnl Sequencing Tasks (written sequencing, picture sequencing)  Sustained attention tasks (use of timers as needed)  Alternating/Divided Attention tasks (Organize the Hour, BITS, Auditory processing)  Memory Strategies Education & Functional Application  Short Term Recall (memory mix up, Memory Game, Brain Box)  Delayed recall (word recall, logical memory, BITS)  Mental Manipulation (3 word alphabetical organization, sentence unscramble, sequencing, mental math)  Topographical Orientation (maps task, mall map, etc)  Cognitive organization ([address], furniture delivery, kitchen shelves etc)  Clock Construction/Time awareness ([address], time identification, clock drawing)  Calendar tasks  HEP development        HISTORY OF PRESENT ILLNESS:     Pt is a 53 y o  female who was referred to Occupational Therapy s/p  Potential for cognitive impairment [Z91 89]  Pt reports obtaining COVID-19 on New Year's [place] and since then, she's been experiencing post COVID symptoms of ongoing nausea, difficulty concentrating, and remembering things  Pt reports she plays guitar and is unable to play guitar since inc tremors due to post COVID related symptoms which inc when pt is nervous  Pt states feeling an inc in stress and depression, with thoughts of self-harm at time  Pt is currently not seeing her talk therapist while awaiting a replacement therapist  Pt reports feeling unsafe in apartment building due to difficult relationship with neighbors, and pt lives alone  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      PMH:   Past Medical History:   Diagnosis Date    Anxiety     Arthritis     oa cassandra knees    Asthma     good control- no medications    Yan's esophagus     Bipolar affective disorder (HCC)     Chronic pain disorder     lumbar    CPAP (continuous positive airway pressure) dependence     Degenerative disc disease at L5-S1 level     Deliberate self-cutting     Depression 2008    Disease of thyroid gland     hypo    MARTINEZ (dyspnea on exertion)     Drug overdose 10/28/2008    suicide attempt    Dysphagia     Dyspnea     Edema     BLE    GERD (gastroesophageal reflux disease)     High blood pressure     Hypertension     Knee pain, bilateral     Especially right    Migraines     Obese     Overactive bladder     Sjogren's disease ([address] )     Sleep apnea     Stress incontinence     Suicidal ideations     Umbilical hernia     surgical repair today 2019    Use of cane as ambulatory aid     awaiting b/l knee replacement    Wears glasses        Pain Levels:     Restin    With Activity:  0    Objective    RUE LUE Comments                 UPPER EXTREMITY FXN Impaired Impaired Dominant Hand: R                         /Pinch Strength         Dynamometer         - Gross Grasp 64 lbs 70 lbs normal    Pinch Meter          - PINCER 12 lbs 15 lbs  normal    - TRIPOD 16 lbs 14 lbs  normal    - LATERAL 17 lbs  15 lbs  normal           9 Hole Peg Test 16 seconds 25 seconds normal       Cognitive Function      Cognitive Checklist: Patient indicated that she is experiencing the following symptoms:    · Memory: Remembering what people have told you and Learning new things    · Attention: Keeping your attention/concentrating on a task    · Processing: Processing new information     · Executive Functions: None reported    · Communication: None reported    · Visual: None reported    · Emotional: Increased anxiety, Increased depression and Easily agitated or irritable    · Increased Sensitivities to: None reported     The Repeatable Battery for the Assessment of Neuropsychological Status (RBANS) is a brief, individually-administered assessment which measures attention, language, visuospatial/constructional abilities, and immediate & delayed memory  The RBANS is intended for use with adolescents to adults, ages 15 to 80 years  The following results were obtained during the administration of the assessment  Form: b    Cognitive Domain/Subtest: Index Score: Percentile Rank: Classification:   IMMEDIATE MEMORY 76 5%ile Borderline        1  List Learning (25/40)        2  Story Memory (9/24)       VISUOSPATIAL/  CONSTRUCTIONAL 116 86%ile High Average        3  Figure Copy (20/20)        4  Line Orientation (9/20)       LANGUAGE 76 5%ile Borderline        5  Picture Naming (10/10)        6  Semantic Fluency (8/40)       ATTENTION 76 5%ile Borderline        7  Digit Span (6/16)        8  Coding (46/89)       DELAYED MEMORY 100 50%ile Average        9  List Recall (8/10)        10  List Recognition (20/20)        11  Story Recall (4/12)        12   Figure Recall (17/20)         Sum of Index Scores:  444   Total Score:  84   Percentile: 50%ile   Classification: Borderline       INTERVENTION COMMENTS:  Diagnosis: Potential for cognitive impairment [Z91 89]  Precautions: hy of self-harm/suicidal ideation  FOTO:   Insurance: Payor: Miesha Kiser / Plan: MEDICARE A AND B / Product Type: Medicare A & B Fee for Service /   1 of Parisa Curl visits, PN due 7/30/21

## 2021-07-04 ENCOUNTER — HOSPITAL ENCOUNTER (EMERGENCY)
Facility: HOSPITAL | Age: 51
Discharge: HOME | End: 2021-07-06
Attending: EMERGENCY MEDICINE | Admitting: EMERGENCY MEDICINE
Payer: MEDICARE

## 2021-07-04 DIAGNOSIS — F32.9 MAJOR DEPRESSIVE DISORDER: Primary | ICD-10-CM

## 2021-07-04 DIAGNOSIS — R45.851 SUICIDAL IDEATIONS: ICD-10-CM

## 2021-07-04 DIAGNOSIS — F39 MOOD DISORDER (HCC): ICD-10-CM

## 2021-07-04 LAB — ETHANOL EXG-MCNC: 0 MG/DL

## 2021-07-04 PROCEDURE — 99285 EMERGENCY DEPT VISIT HI MDM: CPT | Performed by: EMERGENCY MEDICINE

## 2021-07-04 PROCEDURE — 82075 ASSAY OF BREATH ETHANOL: CPT

## 2021-07-04 PROCEDURE — 99285 EMERGENCY DEPT VISIT HI MDM: CPT

## 2021-07-05 ENCOUNTER — APPOINTMENT (EMERGENCY)
Dept: RADIOLOGY | Facility: HOSPITAL | Age: 51
End: 2021-07-05
Payer: MEDICARE

## 2021-07-05 LAB
AMPHETAMINES SERPL QL SCN: POSITIVE
BARBITURATES UR QL: NEGATIVE
BENZODIAZ UR QL: NEGATIVE
COCAINE UR QL: NEGATIVE
ETHANOL EXG-MCNC: 0 MG/DL
EXT PREG TEST URINE: NEGATIVE
EXT. CONTROL ED NAV: NORMAL
METHADONE UR QL: NEGATIVE
OPIATES UR QL SCN: NEGATIVE
OXYCODONE+OXYMORPHONE UR QL SCN: NEGATIVE
PCP UR QL: NEGATIVE
SARS-COV-2 RNA RESP QL NAA+PROBE: NEGATIVE
THC UR QL: POSITIVE

## 2021-07-05 PROCEDURE — 73564 X-RAY EXAM KNEE 4 OR MORE: CPT

## 2021-07-05 PROCEDURE — 94660 CPAP INITIATION&MGMT: CPT

## 2021-07-05 PROCEDURE — U0003 INFECTIOUS AGENT DETECTION BY NUCLEIC ACID (DNA OR RNA); SEVERE ACUTE RESPIRATORY SYNDROME CORONAVIRUS 2 (SARS-COV-2) (CORONAVIRUS DISEASE [COVID-19]), AMPLIFIED PROBE TECHNIQUE, MAKING USE OF HIGH THROUGHPUT TECHNOLOGIES AS DESCRIBED BY CMS-2020-01-R: HCPCS | Performed by: EMERGENCY MEDICINE

## 2021-07-05 PROCEDURE — 80307 DRUG TEST PRSMV CHEM ANLYZR: CPT

## 2021-07-05 PROCEDURE — U0005 INFEC AGEN DETEC AMPLI PROBE: HCPCS | Performed by: EMERGENCY MEDICINE

## 2021-07-05 PROCEDURE — 96372 THER/PROPH/DIAG INJ SC/IM: CPT

## 2021-07-05 PROCEDURE — 94002 VENT MGMT INPAT INIT DAY: CPT

## 2021-07-05 PROCEDURE — 82075 ASSAY OF BREATH ETHANOL: CPT | Performed by: EMERGENCY MEDICINE

## 2021-07-05 PROCEDURE — 94760 N-INVAS EAR/PLS OXIMETRY 1: CPT

## 2021-07-05 PROCEDURE — 81025 URINE PREGNANCY TEST: CPT | Performed by: EMERGENCY MEDICINE

## 2021-07-05 RX ORDER — OLANZAPINE 10 MG/1
5 INJECTION, POWDER, LYOPHILIZED, FOR SOLUTION INTRAMUSCULAR ONCE
Status: COMPLETED | OUTPATIENT
Start: 2021-07-05 | End: 2021-07-05

## 2021-07-05 RX ORDER — DIVALPROEX SODIUM 250 MG/1
500 TABLET, DELAYED RELEASE ORAL DAILY
Status: DISCONTINUED | OUTPATIENT
Start: 2021-07-06 | End: 2021-07-06 | Stop reason: HOSPADM

## 2021-07-05 RX ORDER — MELATONIN
1000 DAILY
COMMUNITY
End: 2021-08-28

## 2021-07-05 RX ORDER — AMLODIPINE BESYLATE 5 MG/1
5 TABLET ORAL DAILY
Status: DISCONTINUED | OUTPATIENT
Start: 2021-07-06 | End: 2021-07-06 | Stop reason: HOSPADM

## 2021-07-05 RX ORDER — TRAZODONE HYDROCHLORIDE 50 MG/1
150 TABLET ORAL
Status: DISCONTINUED | OUTPATIENT
Start: 2021-07-05 | End: 2021-07-06 | Stop reason: HOSPADM

## 2021-07-05 RX ORDER — IBUPROFEN 600 MG/1
600 TABLET ORAL ONCE
Status: COMPLETED | OUTPATIENT
Start: 2021-07-05 | End: 2021-07-05

## 2021-07-05 RX ORDER — PILOCARPINE HYDROCHLORIDE 5 MG/1
5 TABLET, FILM COATED ORAL 2 TIMES DAILY
Status: DISCONTINUED | OUTPATIENT
Start: 2021-07-05 | End: 2021-07-06 | Stop reason: HOSPADM

## 2021-07-05 RX ORDER — MEMANTINE HYDROCHLORIDE 10 MG/1
10 TABLET ORAL 2 TIMES DAILY
Status: DISCONTINUED | OUTPATIENT
Start: 2021-07-05 | End: 2021-07-06 | Stop reason: HOSPADM

## 2021-07-05 RX ADMIN — METOPROLOL TARTRATE 25 MG: 25 TABLET, FILM COATED ORAL at 23:09

## 2021-07-05 RX ADMIN — TRAZODONE HYDROCHLORIDE 150 MG: 50 TABLET ORAL at 23:04

## 2021-07-05 RX ADMIN — OLANZAPINE 5 MG: 10 INJECTION, POWDER, FOR SOLUTION INTRAMUSCULAR at 12:02

## 2021-07-05 RX ADMIN — MEMANTINE 10 MG: 10 TABLET ORAL at 23:04

## 2021-07-05 RX ADMIN — PILOCARPINE HYDROCHLORIDE 5 MG: 5 TABLET, FILM COATED ORAL at 23:04

## 2021-07-05 RX ADMIN — WATER 2 ML: 1 INJECTION INTRAMUSCULAR; INTRAVENOUS; SUBCUTANEOUS at 12:07

## 2021-07-05 RX ADMIN — IBUPROFEN 600 MG: 600 TABLET, FILM COATED ORAL at 08:29

## 2021-07-05 RX ADMIN — DICLOFENAC SODIUM 2 G: 10 GEL TOPICAL at 19:44

## 2021-07-05 RX ADMIN — WATER 2.1 ML: 1 INJECTION INTRAMUSCULAR; INTRAVENOUS; SUBCUTANEOUS at 00:15

## 2021-07-05 RX ADMIN — OLANZAPINE 5 MG: 10 INJECTION, POWDER, FOR SOLUTION INTRAMUSCULAR at 00:11

## 2021-07-05 RX ADMIN — LURASIDONE HYDROCHLORIDE 60 MG: 40 TABLET, FILM COATED ORAL at 23:04

## 2021-07-05 NOTE — ED NOTES
Pt has Medicare A & B    EVS (Eligibility Verification System) called - [phone]  Automated system indicates: Eligible for medical assistance, behavioral services are managed by managed care with [facility] [place] and requires a COB

## 2021-07-05 NOTE — ED NOTES
1:1 initiated at this time, Patient in no apparent distress will continue to monitor for PT safety        Tomasia Cancel  07/05/21 0116

## 2021-07-05 NOTE — ED NOTES
Confirmed with Intake that there are no beds available today  Per Intake, bed availability is limited everywhere and the CPAP will be a deterrent for most facilities  Per Intake, dayshift Crisis indicated that the patient does not have her own CPAP  Intake indicated that discharges are anticipated for tomorrow and they expect the patient will be admitted in-network following discharges  Defer bed search per suggestion of Intake  Crisis Worker attempted to clarify if patient has her own CPAP or not (if she is using a hospital CPAP in the ED)  She has been sleeping  One to one staff agree to ask the patient when she wakes up

## 2021-07-05 NOTE — ED NOTES
Continues to be anxious, crying rattling the stretcher and picking at her old cuts on her R wrist  Upon approaching the room to have her stop became more aggitated accusing us of not taking care of her  R wrist wrapped IM zyprexa given       Devorah Barnett RN  07/05/21 3616

## 2021-07-05 NOTE — ED NOTES
Patient presents to the ED via EMS after calling a suicide hotline  Reports she called her [facility] ICM and therapist for support but never received a response  On exam she is tearful, sobbing and overtly anxious and restless  She intermittently squeezes a stress ball during the interview  Patient reports increased anxiety, depression, hopelessness, worthlessness, poor sleep (slept last night int he ED with C-pap), decrease in appetite x 7 days  Admits to worsening symptoms x3 days  Indicates the following stressors/triggers: The 6/24 anniversary of a friend's death on, her job, acute knee pain that is affecting her quality of life and ability to walk, and feeling as though "everyone hates her"  Also reports she recently lost her therapist, left the practice  Reports to self-harming via superficially cutting both wrists yesterday with a razor  Presently, she reports suicidal ideations with a plan to cut her wrist  Hx of suicide attempts - "I attempted to hang myself and they had to cut the rope with a  "  Last attempt being 3 years ago - was subsequently hospitalized at [facility]  Denies HI/AH/VH  No delusional thinking  Denies D&A use outside of medical [facility] vaping  UDS + THC and Meth  Reports she is a recovering alcoholic, presently sober  Patient is preoccupied with her knee pain and is requesting an x-ray  Reports she uses a cane for support  Notes she is scheduled to have a knee replacement in September  Lives by herself  Working full time, but called off multiple days last week  Notes her father is able to care for her pet cat  Patient has a hx of inpatient psychiatric hospitalizations  Last being at [facility] is following for psychiatry care and counseling  Dr Landaverde Duty manages her medications, appointments are bimonthly  Last appointment was on 6/28/202  Reports her therapist left the practice and she has no one supporting her currently  Marianna Esquivel is her ICM through [facility]   Patient gave verbal consent to notify  Patient agreed to inpatient [facility] admission and signed 201 after read and given her rights  LORENZO Carcamo agreed with the same and signed 201  Chief Complaint   Patient presents with    Psychiatric Evaluation     "i've been having some trouble for a while"     Crisis intake assessment complete, safety risk assessment completed

## 2021-07-05 NOTE — ED NOTES
Bed search efforts:     Per Keysha Marsh with [facility] with [facility], no in-network beds or scheduled d/c today  Intake has 201, for review on 7/5 at the earliest      [facility] - Per Nabila Alexander, no beds today  [address] [place] [address] - Per Blease San, "we are full across campus "   [place] - No beds  [place] - No beds  [facility] - Per [place] [facility], no beds  [facility] [address] - Per admissions, no beds  [facility] - Per [facility], no appropriate beds for patient   [facility] - Per Nilton Padilla, They have beds and will review; 201 and chart faxed  [facility]- Per admissions, "we are full for the moment "   [facility] - Per Sara Can, no beds  [facility] - No answer x 3   [facility] - No beds  [facility] - Per [facility], no adult beds today  Jodie is reviewing   Crisis to f/u

## 2021-07-05 NOTE — ED NOTES
Crying and complaining of severe pain to R knee  Pt states she needs a knee replacement but it can't be done until she loses weight  Pt upset that  " everyone" lies to her that the doctor who was on last night told her he was going to order an  Xray and never did  Attempted to speak with her about her daily medications she takes she refused until we take care of her knee pain  Dr Mile Cedeno at bedside       Javier William RN  07/05/21 6467

## 2021-07-05 NOTE — ED NOTES
Per Kehinde, they are not accepting anyone currently who requires a CPAP and referral has been denied  Bed search is exhausted for this shift  Pt will require an in-network  bed due to medical issues and CPAP

## 2021-07-05 NOTE — ED PROVIDER NOTES
History  Chief Complaint   Patient presents with    Psychiatric Evaluation     "i've been having some trouble for a while"     HPI     Patient is a 28-year-old female that reports to the emergency department with depression  She notes a history of this  She notes suicidal ideation with no plan  No chest pain, shortness of breath, lightheadedness, dizziness, nausea, vomiting, diarrhea  She is in no acute distress but she does appear tearful, poor eye contact  No auditory or visual hallucinations  Medical decision makin-year-old female, suicidal thoughts, she will not tell me her plan but she does note feeling suicidal   Will have patient evaluated by crisis in the morning  Prior to Admission Medications   Prescriptions Last Dose Informant Patient Reported? Taking?    Diclofenac Sodium (VOLTAREN) 1 %   No No   Sig: Apply 2 g topically 4 (four) times a day   Lurasidone HCl (Latuda) 60 MG TABS  Self Yes No   Sig: Take 60 mg by mouth daily with dinner    Neomycin-Polymyxin-Dexameth (MAXITROL OP)   Yes No   Sig: Administer to both eyes as needed Eye ointment   RABEprazole (ACIPHEX) 20 MG tablet   Yes No   Sig: Take 20 mg by mouth every 12 (twelve) hours   TOVIAZ 4 MG TB24  Self No No   Sig: Take 1 tablet (4 mg total) by mouth daily   albuterol (ProAir HFA) 90 mcg/act inhaler  Self No No   Sig: Inhale 2 puffs every 4 (four) hours as needed for wheezing   amLODIPine (NORVASC) 5 mg tablet   No No   Sig: Take 1 tablet (5 mg total) by mouth daily   baclofen 10 mg tablet   No No   Sig: Take 1 tablet (10 mg total) by mouth 3 (three) times a day as needed for muscle spasms   buPROPion (WELLBUTRIN XL) 300 mg 24 hr tablet  Self Yes No   Sig: Take 300 mg by mouth daily    carboxymethylcellulose (REFRESH PLUS) 0 5 % SOLN   Yes No   Sig: Lubricating Plus 0 5 % eye drops in a dropperette   colestipol (COLESTID) 1 g tablet  Self No No   Sig: Take 1 tablet (1 g total) by mouth 2 (two) times a day   divalproex sodium (DEPAKOTE) 500 mg EC tablet  Self Yes No   Sig: Take 500 mg by mouth daily    ergocalciferol (VITAMIN D2) 50,000 units   No No   Sig: Take 1 capsule (50,000 Units total) by mouth once a week   famotidine (PEPCID) 20 mg tablet   Yes No   fluticasone (FLOVENT HFA) 110 MCG/ACT inhaler   No No   Sig: Inhale 2 puffs 2 (two) times a day Rinse mouth after use  fluticasone-salmeterol (ADVAIR, WIXELA) 100-50 mcg/dose inhaler   No No   Sig: Inhale 1 puff 2 (two) times a day Rinse mouth after use     furosemide (LASIX) 20 mg tablet   No No   Sig: Take 1 tablet (20 mg total) by mouth daily   hydrOXYzine HCL (ATARAX) 25 mg tablet   Yes No   levothyroxine 50 mcg tablet   No No   Sig: Take 1 tablet (50 mcg total) by mouth daily in the early morning   loratadine (CLARITIN) 10 mg tablet   No No   Sig: Take 1 tablet (10 mg total) by mouth daily   losartan (COZAAR) 50 mg tablet  Self Yes No   Sig: Take 50 mg by mouth daily    memantine (NAMENDA) 10 mg tablet   No No   Si twice a day   metoprolol tartrate (LOPRESSOR) 25 mg tablet  Self No No   Sig: TAKE ONE TABLET BY MOUTH TWICE A DAY   naproxen (NAPROSYN) 500 mg tablet   No No   Sig: Take 1 tablet (500 mg total) by mouth 2 (two) times a day with meals For 5 days then as needed   nystatin (MYCOSTATIN) powder   No No   Sig: Apply topically 3 (three) times a day   pantoprazole (PROTONIX) 40 mg tablet   Yes No   Sig: Take 40 mg by mouth daily    pilocarpine (SALAGEN) 5 mg tablet   No No   Sig: TAKE 1 TABLET (5 MG TOTAL) BY MOUTH TWO (TWO) TIMES A DAY   topiramate (TOPAMAX) 100 mg tablet  Self Yes No   Sig: Take 100 mg by mouth 2 (two) times a day   traZODone (DESYREL) 150 mg tablet  Self Yes No   Sig: Take 150 mg by mouth daily at bedtime as needed       Facility-Administered Medications: None       Past Medical History:   Diagnosis Date    Anxiety     Arthritis     oa cassandra knees    Asthma     good control- no medications    Yan's esophagus     Bipolar affective disorder ([facility] [address] )     Chronic pain disorder     lumbar    CPAP (continuous positive airway pressure) dependence     Degenerative disc disease at L5-S1 level     Deliberate self-cutting     Depression 09/16/2008    Disease of thyroid gland     hypo    MARTINEZ (dyspnea on exertion)     Drug overdose 10/28/2008    suicide attempt    Dysphagia     Dyspnea     Edema     BLE    GERD (gastroesophageal reflux disease)     High blood pressure     Hypertension     Knee pain, bilateral     Especially right    Migraines     Obese     Overactive bladder     Sjogren's disease ([facility] [address] )     Sleep apnea     Stress incontinence     Suicidal ideations     Umbilical hernia     surgical repair today 4/24/2019    Use of cane as ambulatory aid     awaiting b/l knee replacement    Wears glasses        Past Surgical History:   Procedure Laterality Date    BREAST BIOPSY Right 1989    benign    CARPAL TUNNEL RELEASE Left     CHOLECYSTECTOMY  05/2003    Laparoscopic    COLONOSCOPY      01/12/2009    DILATION AND CURETTAGE OF UTERUS      ELBOW SURGERY Left     x2   No hardware    ESOPHAGOGASTRODUODENOSCOPY      FOOT SURGERY Left     Plantar fasciotomy    HYSTERECTOMY      laporoscopic, partial    KNEE ARTHROSCOPY Bilateral     OOPHORECTOMY Left 10/2015    CO ANAL SPHINCTEROTOMY N/A 8/31/2018    Procedure: EUA, LEFT PARTIAL INTERNAL SPHINCTEROTOMY;  Surgeon: Franci Vaca MD;  Location: [facility] MAIN OR;  Service: Colorectal    CO GASTROCNEMIUS RECESSION Left 2/24/2021    Procedure: RECESSION GASTROC OPEN;  Surgeon: Silvia Acosta DPM;  Location: [facility] MAIN OR;  Service: [place] [id number] QKFW,8+F/L,GUVLC N/A 4/24/2019    Procedure: REPAIR HERNIA UMBILICAL LAPAROSCOPIC W/ ROBOTICS;  Surgeon: Akira Mata MD;  Location: [facility] Main OR;  Service: General    REDUCTION MAMMAPLASTY Bilateral 1999    REPAIR LACERATION Left     left hand-5/18/2009    ROTATOR CUFF REPAIR Left     TONSILECTOMY AND ADNOIDECTOMY     [facility] TONSILLECTOMY      US GUIDED MSK PROCEDURE  4/22/2021    VEIN LIGATION Bilateral     WISDOM TOOTH EXTRACTION         Family History   Problem Relation Age of Onset    Kidney cancer Mother     Diabetes Mother     Colon cancer Family     No Known Problems Father     No Known Problems Sister     Colon cancer Paternal Uncle     Colon cancer Maternal Uncle     Colon cancer Maternal Uncle      I have reviewed and agree with the history as documented  E-Cigarette/Vaping    E-Cigarette Use Current Some Day User     Comments medical THC      E-Cigarette/Vaping Substances    Nicotine No     THC Yes     CBD No     Flavoring No     Other No     Unknown No      Social History     Tobacco Use    Smoking status: Former Smoker     Packs/day: 3 00     Types: Cigarettes     Quit date: 1/2/2018     Years since quitting: 3 5    Smokeless tobacco: Never Used    Tobacco comment: Started at age 14  Vaping Use    Vaping Use: Some days    Substances: THC   Substance Use Topics    Alcohol use: Not Currently     Comment: Recovering alcoholic    Drug use: Yes     Types: Marijuana     Comment: medical- vapes 3 times per wk at hs       Review of Systems   Psychiatric/Behavioral: Positive for dysphoric mood and suicidal ideas  The patient is nervous/anxious  All other systems reviewed and are negative  Physical Exam  Physical Exam  Vitals and nursing note reviewed  Constitutional:       Appearance: She is well-developed  HENT:      Head: Normocephalic and atraumatic  Right Ear: External ear normal       Left Ear: External ear normal    Eyes:      Conjunctiva/sclera: Conjunctivae normal    Neck:      Vascular: No JVD  Trachea: No tracheal deviation  Cardiovascular:      Rate and Rhythm: Normal rate and regular rhythm  Heart sounds: Normal heart sounds  Pulmonary:      Effort: Pulmonary effort is normal  No respiratory distress  Breath sounds: No wheezing or rales     Abdominal: Palpations: Abdomen is soft  Tenderness: There is no abdominal tenderness  There is no guarding or rebound  Musculoskeletal:         General: No tenderness  Cervical back: Normal range of motion and neck supple  Skin:     General: Skin is warm and dry  Findings: No erythema or rash  Neurological:      General: No focal deficit present  Mental Status: She is alert and oriented to person, place, and time  Motor: No weakness  Psychiatric:      Comments: Depressed mood, tearful, anxious         Vital Signs  ED Triage Vitals   Temperature Pulse Respirations Blood Pressure SpO2   07/04/21 2329 07/04/21 2329 07/04/21 2329 07/04/21 2329 07/04/21 2329   98 4 °F (36 9 °C) 77 18 147/82 96 %      Temp Source Heart Rate Source Patient Position - Orthostatic VS BP Location FiO2 (%)   07/04/21 2329 07/04/21 2329 07/04/21 2329 07/04/21 2329 07/05/21 0110   Oral Monitor Sitting Right arm 21      Pain Score       07/04/21 2329       9           Vitals:    07/04/21 2329   BP: 147/82   Pulse: 77   Patient Position - Orthostatic VS: Sitting         Visual Acuity      ED Medications  Medications   OLANZapine (ZyPREXA) IM injection 5 mg (5 mg Intramuscular Given 7/5/21 0011)   sterile water injection **ADS Override Pull** (2 1 mL  Given 7/5/21 0015)       Diagnostic Studies  Results Reviewed     Procedure Component Value Units Date/Time    Novel Coronavirus [facility] [[id number]]  (Normal) Collected: 07/05/21 0007    Lab Status: Final result Specimen: Nares from Nose Updated: 07/05/21 0126     SARS-CoV-2 Negative    Narrative: The specimen collection materials, transport medium, and/or testing methodology utilized in the production of these test results have been proven to be reliable in a limited validation with an abbreviated program under the Emergency Utilization Authorization provided by the FDA    Testing reported as "Presumptive positive" will be confirmed with secondary testing to ensure result accuracy  Clinical caution and judgement should be used with the interpretation of these results with consideration of the clinical impression and other laboratory testing  Testing reported as "Positive" or "Negative" has been proven to be accurate according to standard laboratory validation requirements  All testing is performed with control materials showing appropriate reactivity at standard intervals  POCT alcohol breath test [[id number]]  (Normal) Resulted: 07/05/21 0003    Lab Status: Final result Updated: 07/05/21 0003     EXTBreath Alcohol 0 00    POCT pregnancy, urine [[id number]]     Lab Status: No result     POCT alcohol breath test [[id number]]  (Normal) Resulted: 07/04/21 2353    Lab Status: Final result Updated: 07/04/21 2353     EXTBreath Alcohol 0 000                 No orders to display              Procedures  Procedures         ED Course                             SBIRT 22yo+      Most Recent Value   SBIRT (23 yo +)   In order to provide better care to our patients, we are screening all of our patients for alcohol and drug use  Would it be okay to ask you these screening questions? Yes Filed at: 07/04/2021 2340   Initial Alcohol Screen: US AUDIT-C    1  How often do you have a drink containing alcohol?  0 Filed at: 07/04/2021 2340   2  How many drinks containing alcohol do you have on a typical day you are drinking? 0 Filed at: 07/04/2021 2340   3a  Male UNDER 65: How often do you have five or more drinks on one occasion? 0 Filed at: 07/04/2021 2340   3b  FEMALE Any Age, or MALE 65+: How often do you have 4 or more drinks on one occassion? 0 Filed at: 07/04/2021 2340   Audit-C Score  0 Filed at: 07/04/2021 2340   OLEG: How many times in the past year have you    Used an illegal drug or used a prescription medication for non-medical reasons?   Never Filed at: 07/04/2021 2340                    MDM    Disposition  Final diagnoses:   Suicidal ideations     Time reflects when diagnosis was documented in both MDM as applicable and the Disposition within this note     Time User Action Codes Description Comment    7/5/2021 12:01 AM Boubacar Zarco Add [G46 907] Suicidal ideations       ED Disposition     ED Disposition Condition Date/Time Comment    Transfer to [facility] Jul 5, 2021 12:10 AM   Patient is medically clear for psychiatric admission  Disposition pending  Follow-up Information    None         Patient's Medications   Discharge Prescriptions    No medications on file     No discharge procedures on file      PDMP Review       Value Time User    PDMP Reviewed  Yes 6/16/2021  3:07 PM Efren Holguin MD          ED Provider  Electronically Signed by           Eric Fonseca MD  07/05/21 7713

## 2021-07-06 ENCOUNTER — HOSPITAL ENCOUNTER (INPATIENT)
Facility: HOSPITAL | Age: 51
LOS: 6 days | Discharge: HOME | DRG: 881 | End: 2021-07-12
Attending: HOSPITALIST | Admitting: PSYCHIATRY & NEUROLOGY
Payer: MEDICARE

## 2021-07-06 VITALS
WEIGHT: 27.34 LBS | HEIGHT: 66 IN | OXYGEN SATURATION: 95 % | RESPIRATION RATE: 20 BRPM | TEMPERATURE: 98.2 F | DIASTOLIC BLOOD PRESSURE: 61 MMHG | SYSTOLIC BLOOD PRESSURE: 123 MMHG | HEART RATE: 68 BPM | BODY MASS INDEX: 4.39 KG/M2

## 2021-07-06 DIAGNOSIS — F25.0 SCHIZOAFFECTIVE DISORDER, BIPOLAR TYPE (HCC): Primary | ICD-10-CM

## 2021-07-06 PROCEDURE — 94660 CPAP INITIATION&MGMT: CPT

## 2021-07-06 PROCEDURE — 96372 THER/PROPH/DIAG INJ SC/IM: CPT

## 2021-07-06 PROCEDURE — 94760 N-INVAS EAR/PLS OXIMETRY 1: CPT

## 2021-07-06 RX ORDER — AMOXICILLIN 250 MG
1 CAPSULE ORAL DAILY PRN
Status: CANCELLED | OUTPATIENT
Start: 2021-07-06

## 2021-07-06 RX ORDER — FLUTICASONE PROPIONATE 110 UG/1
1 AEROSOL, METERED RESPIRATORY (INHALATION) EVERY 12 HOURS SCHEDULED
Status: DISCONTINUED | OUTPATIENT
Start: 2021-07-06 | End: 2021-07-06 | Stop reason: HOSPADM

## 2021-07-06 RX ORDER — DIPHENHYDRAMINE HYDROCHLORIDE 50 MG/ML
50 INJECTION INTRAMUSCULAR; INTRAVENOUS EVERY 6 HOURS PRN
Status: DISCONTINUED | OUTPATIENT
Start: 2021-07-06 | End: 2021-07-12 | Stop reason: HOSPADM

## 2021-07-06 RX ORDER — PANTOPRAZOLE SODIUM 20 MG/1
20 TABLET, DELAYED RELEASE ORAL
Status: DISCONTINUED | OUTPATIENT
Start: 2021-07-07 | End: 2021-07-12 | Stop reason: HOSPADM

## 2021-07-06 RX ORDER — OLANZAPINE 10 MG/1
5 INJECTION, POWDER, LYOPHILIZED, FOR SOLUTION INTRAMUSCULAR
Status: CANCELLED | OUTPATIENT
Start: 2021-07-06

## 2021-07-06 RX ORDER — IBUPROFEN 600 MG/1
600 TABLET ORAL EVERY 6 HOURS PRN
Status: CANCELLED | OUTPATIENT
Start: 2021-07-06

## 2021-07-06 RX ORDER — OXYBUTYNIN CHLORIDE 5 MG/1
5 TABLET, EXTENDED RELEASE ORAL DAILY
Refills: 2 | Status: DISCONTINUED | OUTPATIENT
Start: 2021-07-07 | End: 2021-07-12 | Stop reason: HOSPADM

## 2021-07-06 RX ORDER — OLANZAPINE 10 MG/1
10 INJECTION, POWDER, LYOPHILIZED, FOR SOLUTION INTRAMUSCULAR
Status: CANCELLED | OUTPATIENT
Start: 2021-07-06

## 2021-07-06 RX ORDER — LORAZEPAM 2 MG/ML
2 INJECTION INTRAMUSCULAR EVERY 6 HOURS PRN
Status: DISCONTINUED | OUTPATIENT
Start: 2021-07-06 | End: 2021-07-12 | Stop reason: HOSPADM

## 2021-07-06 RX ORDER — PILOCARPINE HYDROCHLORIDE 5 MG/1
5 TABLET, FILM COATED ORAL 2 TIMES DAILY
Status: DISCONTINUED | OUTPATIENT
Start: 2021-07-07 | End: 2021-07-12 | Stop reason: HOSPADM

## 2021-07-06 RX ORDER — ALBUTEROL SULFATE 90 UG/1
2 AEROSOL, METERED RESPIRATORY (INHALATION) EVERY 4 HOURS PRN
Status: DISCONTINUED | OUTPATIENT
Start: 2021-07-06 | End: 2021-07-06 | Stop reason: HOSPADM

## 2021-07-06 RX ORDER — HYDROXYZINE HYDROCHLORIDE 25 MG/1
100 TABLET, FILM COATED ORAL
Status: CANCELLED | OUTPATIENT
Start: 2021-07-06

## 2021-07-06 RX ORDER — ALBUTEROL SULFATE 90 UG/1
2 AEROSOL, METERED RESPIRATORY (INHALATION) EVERY 4 HOURS PRN
Status: DISCONTINUED | OUTPATIENT
Start: 2021-07-06 | End: 2021-07-12 | Stop reason: HOSPADM

## 2021-07-06 RX ORDER — OLANZAPINE 10 MG/1
7 INJECTION, POWDER, LYOPHILIZED, FOR SOLUTION INTRAMUSCULAR ONCE
Status: COMPLETED | OUTPATIENT
Start: 2021-07-06 | End: 2021-07-06

## 2021-07-06 RX ORDER — OLANZAPINE 10 MG/1
10 TABLET ORAL
Status: CANCELLED | OUTPATIENT
Start: 2021-07-06

## 2021-07-06 RX ORDER — IBUPROFEN 400 MG/1
800 TABLET ORAL EVERY 8 HOURS PRN
Status: CANCELLED | OUTPATIENT
Start: 2021-07-06

## 2021-07-06 RX ORDER — OLANZAPINE 2.5 MG/1
5 TABLET ORAL
Status: CANCELLED | OUTPATIENT
Start: 2021-07-06

## 2021-07-06 RX ORDER — MAGNESIUM HYDROXIDE/ALUMINUM HYDROXICE/SIMETHICONE 120; 1200; 1200 MG/30ML; MG/30ML; MG/30ML
30 SUSPENSION ORAL EVERY 4 HOURS PRN
Status: CANCELLED | OUTPATIENT
Start: 2021-07-06

## 2021-07-06 RX ORDER — HYDROXYZINE HYDROCHLORIDE 25 MG/1
25 TABLET, FILM COATED ORAL
Status: DISCONTINUED | OUTPATIENT
Start: 2021-07-06 | End: 2021-07-12 | Stop reason: HOSPADM

## 2021-07-06 RX ORDER — OLANZAPINE 2.5 MG/1
2.5 TABLET ORAL
Status: DISCONTINUED | OUTPATIENT
Start: 2021-07-06 | End: 2021-07-12 | Stop reason: HOSPADM

## 2021-07-06 RX ORDER — OLANZAPINE 2.5 MG/1
2.5 TABLET ORAL
Status: CANCELLED | OUTPATIENT
Start: 2021-07-06

## 2021-07-06 RX ORDER — TRAZODONE HYDROCHLORIDE 150 MG/1
150 TABLET ORAL ONCE
Status: COMPLETED | OUTPATIENT
Start: 2021-07-07 | End: 2021-07-07

## 2021-07-06 RX ORDER — BENZTROPINE MESYLATE 0.5 MG/1
1 TABLET ORAL
Status: CANCELLED | OUTPATIENT
Start: 2021-07-06

## 2021-07-06 RX ORDER — FUROSEMIDE 40 MG/1
20 TABLET ORAL DAILY
Status: DISCONTINUED | OUTPATIENT
Start: 2021-07-07 | End: 2021-07-12 | Stop reason: HOSPADM

## 2021-07-06 RX ORDER — LANOLIN ALCOHOL/MO/W.PET/CERES
3 CREAM (GRAM) TOPICAL
Status: DISCONTINUED | OUTPATIENT
Start: 2021-07-06 | End: 2021-07-12 | Stop reason: HOSPADM

## 2021-07-06 RX ORDER — HYDROXYZINE HYDROCHLORIDE 25 MG/1
50 TABLET, FILM COATED ORAL
Status: CANCELLED | OUTPATIENT
Start: 2021-07-06

## 2021-07-06 RX ORDER — TRAZODONE HYDROCHLORIDE 50 MG/1
50 TABLET ORAL
Status: CANCELLED | OUTPATIENT
Start: 2021-07-06

## 2021-07-06 RX ORDER — DIPHENHYDRAMINE HYDROCHLORIDE 50 MG/ML
50 INJECTION INTRAMUSCULAR; INTRAVENOUS EVERY 6 HOURS PRN
Status: CANCELLED | OUTPATIENT
Start: 2021-07-06

## 2021-07-06 RX ORDER — BENZTROPINE MESYLATE 1 MG/ML
1 INJECTION INTRAMUSCULAR; INTRAVENOUS
Status: CANCELLED | OUTPATIENT
Start: 2021-07-06

## 2021-07-06 RX ORDER — HYDROXYZINE HYDROCHLORIDE 25 MG/1
25 TABLET, FILM COATED ORAL
Status: CANCELLED | OUTPATIENT
Start: 2021-07-06

## 2021-07-06 RX ORDER — IBUPROFEN 400 MG/1
400 TABLET ORAL EVERY 4 HOURS PRN
Status: DISCONTINUED | OUTPATIENT
Start: 2021-07-06 | End: 2021-07-12 | Stop reason: HOSPADM

## 2021-07-06 RX ORDER — MEMANTINE HYDROCHLORIDE 5 MG/1
10 TABLET ORAL 2 TIMES DAILY
Status: DISCONTINUED | OUTPATIENT
Start: 2021-07-07 | End: 2021-07-12 | Stop reason: HOSPADM

## 2021-07-06 RX ORDER — LEVOTHYROXINE SODIUM 0.03 MG/1
50 TABLET ORAL
Status: DISCONTINUED | OUTPATIENT
Start: 2021-07-07 | End: 2021-07-12 | Stop reason: HOSPADM

## 2021-07-06 RX ORDER — IBUPROFEN 600 MG/1
600 TABLET ORAL EVERY 6 HOURS PRN
Status: DISCONTINUED | OUTPATIENT
Start: 2021-07-06 | End: 2021-07-12 | Stop reason: HOSPADM

## 2021-07-06 RX ORDER — OLANZAPINE 10 MG/1
2.5 INJECTION, POWDER, LYOPHILIZED, FOR SOLUTION INTRAMUSCULAR
Status: DISCONTINUED | OUTPATIENT
Start: 2021-07-06 | End: 2021-07-12 | Stop reason: HOSPADM

## 2021-07-06 RX ORDER — IBUPROFEN 800 MG/1
800 TABLET ORAL EVERY 8 HOURS PRN
Status: DISCONTINUED | OUTPATIENT
Start: 2021-07-06 | End: 2021-07-12 | Stop reason: HOSPADM

## 2021-07-06 RX ORDER — TOPIRAMATE 25 MG/1
100 TABLET ORAL 2 TIMES DAILY
Status: DISCONTINUED | OUTPATIENT
Start: 2021-07-07 | End: 2021-07-12 | Stop reason: HOSPADM

## 2021-07-06 RX ORDER — LOSARTAN POTASSIUM 25 MG/1
50 TABLET ORAL DAILY
Status: DISCONTINUED | OUTPATIENT
Start: 2021-07-07 | End: 2021-07-12 | Stop reason: HOSPADM

## 2021-07-06 RX ORDER — FLUTICASONE PROPIONATE 110 UG/1
2 AEROSOL, METERED RESPIRATORY (INHALATION) 2 TIMES DAILY
Status: DISCONTINUED | OUTPATIENT
Start: 2021-07-07 | End: 2021-07-07

## 2021-07-06 RX ORDER — LORAZEPAM 2 MG/ML
2 INJECTION INTRAMUSCULAR EVERY 6 HOURS PRN
Status: CANCELLED | OUTPATIENT
Start: 2021-07-06

## 2021-07-06 RX ORDER — HYDROXYZINE HYDROCHLORIDE 25 MG/1
25 TABLET, FILM COATED ORAL EVERY 6 HOURS PRN
Status: DISCONTINUED | OUTPATIENT
Start: 2021-07-06 | End: 2021-07-06 | Stop reason: HOSPADM

## 2021-07-06 RX ORDER — HYDROXYZINE HYDROCHLORIDE 25 MG/1
50 TABLET, FILM COATED ORAL
Status: DISCONTINUED | OUTPATIENT
Start: 2021-07-06 | End: 2021-07-12 | Stop reason: HOSPADM

## 2021-07-06 RX ORDER — OLANZAPINE 5 MG/1
5 TABLET ORAL
Status: DISCONTINUED | OUTPATIENT
Start: 2021-07-06 | End: 2021-07-12 | Stop reason: HOSPADM

## 2021-07-06 RX ORDER — IBUPROFEN 400 MG/1
400 TABLET ORAL EVERY 4 HOURS PRN
Status: CANCELLED | OUTPATIENT
Start: 2021-07-06

## 2021-07-06 RX ORDER — OLANZAPINE 10 MG/1
5 INJECTION, POWDER, LYOPHILIZED, FOR SOLUTION INTRAMUSCULAR
Status: DISCONTINUED | OUTPATIENT
Start: 2021-07-06 | End: 2021-07-12 | Stop reason: HOSPADM

## 2021-07-06 RX ORDER — MELATONIN
1000 DAILY
Status: DISCONTINUED | OUTPATIENT
Start: 2021-07-07 | End: 2021-07-12 | Stop reason: HOSPADM

## 2021-07-06 RX ORDER — MONTELUKAST SODIUM 4 MG/1
1 TABLET, CHEWABLE ORAL 2 TIMES DAILY
Status: DISCONTINUED | OUTPATIENT
Start: 2021-07-07 | End: 2021-07-07

## 2021-07-06 RX ORDER — AMLODIPINE BESYLATE 5 MG/1
5 TABLET ORAL DAILY
Status: DISCONTINUED | OUTPATIENT
Start: 2021-07-07 | End: 2021-07-12 | Stop reason: HOSPADM

## 2021-07-06 RX ORDER — HYDROXYZINE HYDROCHLORIDE 25 MG/1
100 TABLET, FILM COATED ORAL
Status: DISCONTINUED | OUTPATIENT
Start: 2021-07-06 | End: 2021-07-12 | Stop reason: HOSPADM

## 2021-07-06 RX ORDER — BACLOFEN 10 MG/1
10 TABLET ORAL 3 TIMES DAILY PRN
Status: DISCONTINUED | OUTPATIENT
Start: 2021-07-06 | End: 2021-07-12 | Stop reason: HOSPADM

## 2021-07-06 RX ORDER — BENZTROPINE MESYLATE 1 MG/1
1 TABLET ORAL
Status: DISCONTINUED | OUTPATIENT
Start: 2021-07-06 | End: 2021-07-12 | Stop reason: HOSPADM

## 2021-07-06 RX ORDER — MONTELUKAST SODIUM 4 MG/1
1 TABLET, CHEWABLE ORAL 2 TIMES DAILY
Status: DISCONTINUED | OUTPATIENT
Start: 2021-07-06 | End: 2021-07-06 | Stop reason: HOSPADM

## 2021-07-06 RX ADMIN — LURASIDONE HYDROCHLORIDE 60 MG: 40 TABLET, FILM COATED ORAL at 18:10

## 2021-07-06 RX ADMIN — MEMANTINE 10 MG: 10 TABLET ORAL at 18:07

## 2021-07-06 RX ADMIN — COLESTIPOL HYDROCHLORIDE 1 G: 1 TABLET ORAL at 02:29

## 2021-07-06 RX ADMIN — DICLOFENAC SODIUM 2 G: 10 GEL TOPICAL at 11:03

## 2021-07-06 RX ADMIN — MELATONIN 3 MG: at 23:03

## 2021-07-06 RX ADMIN — FLUTICASONE PROPIONATE 1 PUFF: 110 AEROSOL, METERED RESPIRATORY (INHALATION) at 02:33

## 2021-07-06 RX ADMIN — WATER 10 ML: 1 INJECTION INTRAMUSCULAR; INTRAVENOUS; SUBCUTANEOUS at 03:18

## 2021-07-06 RX ADMIN — HYDROXYZINE HYDROCHLORIDE 25 MG: 25 TABLET, FILM COATED ORAL at 13:08

## 2021-07-06 RX ADMIN — FLUTICASONE PROPIONATE 1 PUFF: 110 AEROSOL, METERED RESPIRATORY (INHALATION) at 11:08

## 2021-07-06 RX ADMIN — OLANZAPINE 7 MG: 10 INJECTION, POWDER, FOR SOLUTION INTRAMUSCULAR at 03:07

## 2021-07-06 RX ADMIN — MEMANTINE 10 MG: 10 TABLET ORAL at 11:07

## 2021-07-06 RX ADMIN — DICLOFENAC SODIUM 2 G: 10 GEL TOPICAL at 18:11

## 2021-07-06 RX ADMIN — DIVALPROEX SODIUM 500 MG: 250 TABLET, DELAYED RELEASE ORAL at 11:02

## 2021-07-06 RX ADMIN — METOPROLOL TARTRATE 25 MG: 25 TABLET, FILM COATED ORAL at 23:54

## 2021-07-06 RX ADMIN — METOPROLOL TARTRATE 25 MG: 25 TABLET, FILM COATED ORAL at 11:02

## 2021-07-06 RX ADMIN — AMLODIPINE BESYLATE 5 MG: 5 TABLET ORAL at 11:01

## 2021-07-06 RX ADMIN — PILOCARPINE HYDROCHLORIDE 5 MG: 5 TABLET, FILM COATED ORAL at 11:06

## 2021-07-06 RX ADMIN — PILOCARPINE HYDROCHLORIDE 5 MG: 5 TABLET, FILM COATED ORAL at 18:07

## 2021-07-06 RX ADMIN — COLESTIPOL HYDROCHLORIDE 1 G: 1 TABLET ORAL at 18:07

## 2021-07-06 RX ADMIN — ALBUTEROL SULFATE 2 PUFF: 90 AEROSOL, METERED RESPIRATORY (INHALATION) at 02:29

## 2021-07-06 NOTE — ED NOTES
Patient belongings (including her cane) [facility]  Security has been made aware       April Jeffrey Law RN  07/06/21 1829

## 2021-07-06 NOTE — ED NOTES
Dr Margarita Ibanez, Telepsychiatry, unable to connect to [address] [place] [address], waiting to hear from Dionne Nam RN  07/06/21 2997

## 2021-07-06 NOTE — ED NOTES
After zyprexa injection, patient apologized for behavior and is now calmly resting in her recliner watching TV  CPAP applied by respiratory which has also calmed the pt   Will continue to monitor             Kadi Plascencia RN  07/06/21 0381

## 2021-07-06 NOTE — ED NOTES
Insurance Authorization for admission:   Phone call placed to [facility]  Phone number: [phone]  Spoke to Michelle ESCOTO completed  Level of care: inpatient mental health 201  Authorization #: pended for admission to [facility]; Shalom to call on arrival          Primary is Medicare A+B  No precert required for admission or transport

## 2021-07-06 NOTE — ED NOTES
Per Intake, no appropriate network beds available  ED staff notified  Psychiatric Consult requested  Crisis to continue bed search

## 2021-07-06 NOTE — EMTALA/ACUTE CARE TRANSFER
[address] [place] [address]  Dept: [phone]      EMTALA TRANSFER CONSENT    NAME Ykoasta Rose                                         1970                              MRN [id number]    I have been informed of my rights regarding examination, treatment, and transfer   by Dr Odette Trejo DO    Benefits: Specialized equipment and/or services available at the receiving facility (Include comment)________________________, Continuity of care, Other benefits (Include comment)_______________________ (inpatient mental health 201)    Risks: Potential for delay in receiving treatment, Potential deterioration of medical condition, Possible worsening of condition or death during transfer, Increased discomfort during transfer      Consent for Transfer:  I acknowledge that my medical condition has been evaluated and explained to me by the emergency department physician or other qualified medical person and/or my attending physician, who has recommended that I be transferred to the service of  Accepting Physician: Dr Bipin Durant at [address] [place] [address] Name, [address] : [facility]  The above potential benefits of such transfer, the potential risks associated with such transfer, and the probable risks of not being transferred have been explained to me, and I fully understand them  The doctor has explained that, in my case, the benefits of transfer outweigh the risks  I agree to be transferred  I authorize the performance of emergency medical procedures and treatments upon me in both transit and upon arrival at the receiving facility  Additionally, I authorize the release of any and all medical records to the receiving facility and request they be transported with me, if possible    I understand that the safest mode of transportation during a medical emergency is an ambulance and that the Hospital advocates the use of this mode of transport  Risks of traveling to the receiving facility by car, including absence of medical control, life sustaining equipment, such as oxygen, and medical personnel has been explained to me and I fully understand them  (LONI CORRECT BOX BELOW)  [  ]  I consent to the stated transfer and to be transported by ambulance/helicopter  [  ]  I consent to the stated transfer, but refuse transportation by ambulance and accept full responsibility for my transportation by car  I understand the risks of non-ambulance transfers and I exonerate the Hospital and its staff from any deterioration in my condition that results from this refusal     X___________________________________________    DATE  21  TIME________  Signature of patient or legally responsible individual signing on patient behalf           RELATIONSHIP TO PATIENT_________________________          Provider Certification    NAME Killian Amaya                                        [facility] 1970                              MRN [id number]    A medical screening exam was performed on the above named patient  Based on the examination:    Condition Necessitating Transfer The primary encounter diagnosis was Major depressive disorder  Diagnoses of Suicidal ideations and Mood disorder ([facility] [address] ) were also pertinent to this visit      Patient Condition: The patient has been stabilized such that within reasonable medical probability, no material deterioration of the patient condition or the condition of the unborn child(jessica) is likely to result from the transfer    Reason for Transfer: Level of Care needed not available at this facility, No bed available at level of patient's needs, Other (Include comment)____________________ (inpatient mental health 201)    Transfer Requirements: [address]   · Space available and qualified personnel available for treatment as acknowledged by LASHELL  · Agreed to accept transfer and to provide appropriate medical treatment as acknowledged by       Dr Marshal Calix  · Appropriate medical records of the examination and treatment of the patient are provided at the time of transfer   [facility], [address] _______  · Transfer will be performed by qualified personnel from [address] [place] [address]  and appropriate transfer equipment as required, including the use of necessary and appropriate life support measures  Provider Certification: I have examined the patient and explained the following risks and benefits of being transferred/refusing transfer to the patient/family:  General risk, such as traffic hazards, adverse weather conditions, rough terrain or turbulence, possible failure of equipment (including vehicle or aircraft), or consequences of actions of persons outside the control of the transport personnel, Unanticipated needs of medical equipment and personnel during transport, Risk of worsening condition, The possibility of a transport vehicle being unavailable, The patient is stable for psychiatric transfer because they are medically stable, and is protected from harming him/herself or others during transport      Based on these reasonable risks and benefits to the patient and/or the unborn child(jessica), and based upon the information available at the time of the patients examination, I certify that the medical benefits reasonably to be expected from the provision of appropriate medical treatments at another medical facility outweigh the increasing risks, if any, to the individuals medical condition, and in the case of labor to the unborn child, from effecting the transfer      X____________________________________________ DATE 07/06/21        TIME_______      ORIGINAL - SEND TO MEDICAL RECORDS   COPY - SEND WITH PATIENT DURING TRANSFER

## 2021-07-06 NOTE — ED NOTES
Patient is currently inconsolable and crying in her room  She is punching the recliner and throwing pillows on the ground stating "I don't want to live anymore, what is the point" and is stating her frustrations about being in the hospital, not getting her medicines from the pharmacy, and her CPAP machine  Medicines and CPAP have been offered throughout the night but pt was uninterested/not responding to staff  Pt explained difficult living situation and mentioned that she is "usually in restraints" when she gets this upset              Becca Barajas RN  07/06/21 9364

## 2021-07-06 NOTE — ED NOTES
Crisis Worker spoke with patient regarding lack of network beds and need to continue bed search, but cautioned that many facilities are unwilling to accept someone with CPAP and/or if they have CPAP, they would need to bring their own, which patient has already stated she is unable to supply her own  Patient was very upset and rather whiney, stating that she wants to leave  She complained about having to "wait for 7 hours" to get her medicine and a CPAP machine last night to go to bed  She also complained about not receiving medications, inhalers, and other things she claims to need for survival, adding, "I haven't had them for how many days now"  She stated that she would like to be released "because I didn't come here to have to sit here and wait and be treated like this"  States she would like to speak with the doctor about being released    961Chandrakant Rahman is aware and a Psychiatric Consult has been ordered  He will defer to Psychiatry

## 2021-07-06 NOTE — ED NOTES
On-site Psychiatry is not available per Dr Carina Moses, and arrangements will need to be made for Jessi to assess the patient if not accepted at [facility] (referral in progress)  TT sent to RN to coordinate that Psychiatric Consult with Jessi via the   [facility]

## 2021-07-06 NOTE — ED NOTES
Patient was heard banging on walls and sobbing from a few rooms away  RN approached room and started to talk to patient  After several minutes patient started to express her frustrations  Her cat was not going to be cared for  She was going to miss an orthopedic appt and really needs it because she needs a knee replacement and her cane just isnt helping anymore  Lastly, none of her meds have been given to her, and now she is "so ramped up, I need something to help me" Once patient was finished RN told her she would look into something more supportive to assist with walking, help her looking into changing her ortho appt, and look into her medications including an order to help with her her anxiety        Edie Esquivel RN  07/06/21 9809

## 2021-07-06 NOTE — ED NOTES
Crisis Worker confirmed with Intake that they have the referral   The wait list is substantial, however, and there will be no confirmation of discharges until later this morning

## 2021-07-06 NOTE — ED NOTES
Patient is accepted at [facility]  Patient is accepted by JHONATAN Diaz with Attending, Dr Bipin HUDDLESTON  Transportation is arranged with [facility]  Transportation is scheduled for 1730 with [facility]  Patient may go to the floor with transport after 1700  Intake was notified  EMTALA initiated  To be completed, printed and signed on site  Nurse report is to be called to [phone] prior to patient transfer

## 2021-07-06 NOTE — ED NOTES
Patient was educated on the use of a walker and was able to appropriately demonstrate an understanding       Edie Cross RN  07/06/21 9518

## 2021-07-06 NOTE — ED NOTES
At patient's request, an attempt was made to reach her  with Amada Camejo ([phone])  She was not available  A voice mail message was left with a brief update and advising that she can call [facility] tomorrow if there are any concerns or questions

## 2021-07-06 NOTE — ED NOTES
Crisis Worker was able to confirm that the patient was using the hospital's CPAP machine  She stated that she has a CPAP at home, but has no one she can have pick it up and bring it  She was rather whiney, needy, and histrionic, complaining that all people do is lie to her  Crisis offered support and assurance  She was tearful, but accepting  Advised that securing a bed outside the network has been challenging due to CPAP use  She was tearful about this, "I can't help that I need that"  Support provided  Advised her that Intake expects bed availability tomorrow and that ED staff will make an effort to keep her comfortable until she can be transferred  She voiced understanding

## 2021-07-06 NOTE — ED NOTES
Called [phone], Dr Johana Morris assigned     Mckenzie Cano, GUS  07/06/21 2104

## 2021-07-06 NOTE — ED NOTES
RN was able to get patient a walker, medications (already prescribed to her) and for anxiety, a phone to change her ortho appt, and we discussed that at this time her father was the only person who could look over her cat to ensure it had fresh food and water  Patient is calm and cooperative at this time       April Priya Lee RN  07/06/21 4653

## 2021-07-07 PROBLEM — R13.10 DYSPHAGIA: Status: RESOLVED | Noted: 2017-12-22 | Resolved: 2021-07-07

## 2021-07-07 PROBLEM — N32.81 OVERACTIVE BLADDER: Chronic | Status: ACTIVE | Noted: 2017-06-08

## 2021-07-07 PROBLEM — R07.2 PRECORDIAL PAIN: Status: RESOLVED | Noted: 2017-01-21 | Resolved: 2021-07-07

## 2021-07-07 PROBLEM — E03.9 HYPOTHYROIDISM: Chronic | Status: ACTIVE | Noted: 2017-03-17

## 2021-07-07 PROBLEM — E66.01 CLASS 3 SEVERE OBESITY DUE TO EXCESS CALORIES WITH SERIOUS COMORBIDITY AND BODY MASS INDEX (BMI) OF 40.0 TO 44.9 IN ADULT (HCC): Chronic | Status: ACTIVE | Noted: 2020-01-02

## 2021-07-07 PROBLEM — J44.9 COPD (CHRONIC OBSTRUCTIVE PULMONARY DISEASE) (HCC): Chronic | Status: ACTIVE | Noted: 2019-06-12

## 2021-07-07 PROBLEM — Z86.16 HISTORY OF COVID-19: Status: RESOLVED | Noted: 2020-12-30 | Resolved: 2021-07-07

## 2021-07-07 PROBLEM — E66.813 CLASS 3 SEVERE OBESITY DUE TO EXCESS CALORIES WITH SERIOUS COMORBIDITY AND BODY MASS INDEX (BMI) OF 40.0 TO 44.9 IN ADULT (HCC): Chronic | Status: ACTIVE | Noted: 2020-01-02

## 2021-07-07 PROBLEM — J44.9 COPD (CHRONIC OBSTRUCTIVE PULMONARY DISEASE) (HCC): Status: ACTIVE | Noted: 2019-06-12

## 2021-07-07 PROBLEM — G47.33 OSA (OBSTRUCTIVE SLEEP APNEA): Chronic | Status: ACTIVE | Noted: 2018-04-24

## 2021-07-07 PROBLEM — F19.10 SUBSTANCE ABUSE (HCC): Status: ACTIVE | Noted: 2021-07-07

## 2021-07-07 PROCEDURE — 99222 1ST HOSP IP/OBS MODERATE 55: CPT | Performed by: HOSPITALIST

## 2021-07-07 PROCEDURE — 99222 1ST HOSP IP/OBS MODERATE 55: CPT | Performed by: STUDENT IN AN ORGANIZED HEALTH CARE EDUCATION/TRAINING PROGRAM

## 2021-07-07 PROCEDURE — 94660 CPAP INITIATION&MGMT: CPT

## 2021-07-07 PROCEDURE — 94760 N-INVAS EAR/PLS OXIMETRY 1: CPT

## 2021-07-07 RX ORDER — BUPROPION HYDROCHLORIDE 150 MG/1
300 TABLET ORAL DAILY
Status: DISCONTINUED | OUTPATIENT
Start: 2021-07-07 | End: 2021-07-07

## 2021-07-07 RX ORDER — TRAZODONE HYDROCHLORIDE 150 MG/1
150 TABLET ORAL
Status: DISCONTINUED | OUTPATIENT
Start: 2021-07-07 | End: 2021-07-12 | Stop reason: HOSPADM

## 2021-07-07 RX ORDER — FLUTICASONE PROPIONATE 110 UG/1
2 AEROSOL, METERED RESPIRATORY (INHALATION) 2 TIMES DAILY
Status: DISCONTINUED | OUTPATIENT
Start: 2021-07-07 | End: 2021-07-12 | Stop reason: HOSPADM

## 2021-07-07 RX ORDER — DIVALPROEX SODIUM 500 MG/1
500 TABLET, EXTENDED RELEASE ORAL DAILY
Status: DISCONTINUED | OUTPATIENT
Start: 2021-07-07 | End: 2021-07-07

## 2021-07-07 RX ORDER — BUPROPION HYDROCHLORIDE 150 MG/1
450 TABLET ORAL DAILY
Status: DISCONTINUED | OUTPATIENT
Start: 2021-07-08 | End: 2021-07-12 | Stop reason: HOSPADM

## 2021-07-07 RX ORDER — MONTELUKAST SODIUM 4 MG/1
1 TABLET, CHEWABLE ORAL 2 TIMES DAILY
Status: DISCONTINUED | OUTPATIENT
Start: 2021-07-07 | End: 2021-07-12 | Stop reason: HOSPADM

## 2021-07-07 RX ADMIN — FUROSEMIDE 20 MG: 40 TABLET ORAL at 08:11

## 2021-07-07 RX ADMIN — COLESTIPOL HYDROCHLORIDE 1 G: 1 TABLET ORAL at 19:03

## 2021-07-07 RX ADMIN — BACLOFEN 10 MG: 10 TABLET ORAL at 11:52

## 2021-07-07 RX ADMIN — TOPIRAMATE 100 MG: 25 TABLET, FILM COATED ORAL at 17:04

## 2021-07-07 RX ADMIN — MEMANTINE 10 MG: 5 TABLET ORAL at 17:04

## 2021-07-07 RX ADMIN — LOSARTAN POTASSIUM 50 MG: 25 TABLET, FILM COATED ORAL at 08:13

## 2021-07-07 RX ADMIN — METOPROLOL TARTRATE 25 MG: 25 TABLET, FILM COATED ORAL at 17:01

## 2021-07-07 RX ADMIN — LEVOTHYROXINE SODIUM 50 MCG: 25 TABLET ORAL at 08:09

## 2021-07-07 RX ADMIN — PILOCARPINE HYDROCHLORIDE 5 MG: 5 TABLET, FILM COATED ORAL at 17:04

## 2021-07-07 RX ADMIN — OXYBUTYNIN CHLORIDE 5 MG: 5 TABLET, EXTENDED RELEASE ORAL at 08:13

## 2021-07-07 RX ADMIN — DICLOFENAC SODIUM 2 G: 10 GEL TOPICAL at 17:07

## 2021-07-07 RX ADMIN — FLUTICASONE PROPIONATE 2 PUFF: 110 AEROSOL, METERED RESPIRATORY (INHALATION) at 08:15

## 2021-07-07 RX ADMIN — PANTOPRAZOLE SODIUM 20 MG: 20 TABLET, DELAYED RELEASE ORAL at 08:12

## 2021-07-07 RX ADMIN — PILOCARPINE HYDROCHLORIDE 5 MG: 5 TABLET, FILM COATED ORAL at 08:12

## 2021-07-07 RX ADMIN — DICLOFENAC SODIUM 2 G: 10 GEL TOPICAL at 08:15

## 2021-07-07 RX ADMIN — METOPROLOL TARTRATE 25 MG: 25 TABLET, FILM COATED ORAL at 08:10

## 2021-07-07 RX ADMIN — AMLODIPINE BESYLATE 5 MG: 5 TABLET ORAL at 08:11

## 2021-07-07 RX ADMIN — Medication 1000 UNITS: at 08:13

## 2021-07-07 RX ADMIN — BUPROPION HYDROCHLORIDE 300 MG: 150 TABLET, FILM COATED, EXTENDED RELEASE ORAL at 08:10

## 2021-07-07 RX ADMIN — TRAZODONE HYDROCHLORIDE 150 MG: 150 TABLET ORAL at 00:29

## 2021-07-07 RX ADMIN — TOPIRAMATE 100 MG: 25 TABLET, FILM COATED ORAL at 08:09

## 2021-07-07 RX ADMIN — DICLOFENAC SODIUM 2 G: 10 GEL TOPICAL at 13:07

## 2021-07-07 RX ADMIN — MEMANTINE 10 MG: 5 TABLET ORAL at 08:12

## 2021-07-07 RX ADMIN — FLUTICASONE PROPIONATE 2 PUFF: 110 AEROSOL, METERED RESPIRATORY (INHALATION) at 21:32

## 2021-07-07 RX ADMIN — DIVALPROEX SODIUM 750 MG: 500 TABLET, EXTENDED RELEASE ORAL at 21:27

## 2021-07-07 RX ADMIN — BACLOFEN 10 MG: 10 TABLET ORAL at 21:28

## 2021-07-07 RX ADMIN — DIVALPROEX SODIUM 500 MG: 500 TABLET, EXTENDED RELEASE ORAL at 08:12

## 2021-07-07 RX ADMIN — PANTOPRAZOLE SODIUM 20 MG: 20 TABLET, DELAYED RELEASE ORAL at 16:33

## 2021-07-07 RX ADMIN — LURASIDONE HYDROCHLORIDE 60 MG: 40 TABLET, FILM COATED ORAL at 08:10

## 2021-07-07 RX ADMIN — MELATONIN 3 MG: at 21:27

## 2021-07-07 RX ADMIN — DICLOFENAC SODIUM 2 G: 10 GEL TOPICAL at 21:29

## 2021-07-07 NOTE — PROGRESS NOTES
07/07/21 07   Activity/Group Checklist   Group   (Goal Planning and Communication)   Attendance Did not attend  (AT Group offered, PT was sleeping)

## 2021-07-07 NOTE — NURSING NOTE
Pt is a 54 YO female with a Hx of shizoaffective disorder, bipolar, anxiety, depression and borderline personality disorder  Pt also has Hx of multiple suicide attempts  Pt was admitted to [address] [place] [address] ED via EMS on a 201 with reported increased depression and SI with no plan  Patient admitted to [facility] from [address] [place] [address] ED with a valid 201  Per admitting nurse, physical assessment completed by staff  Pt refused a shower  Interview was successful and pt signed admission paperwork  Pt is labile with periods of crying  Admission medication and diet ordered  Started on Q 7-minute safety checks  Fluids and food offered  Personal property recorded  Pt oriented to unit and shown to room  Pt out of room and tearful d/t not having CPAP for bed  This nurse reassured that the pts CPAP order was placed and RT will be up as soon as they can  Pt escorted to room 243 after becoming agitated and crying in the brown for several minutes  This nurse was able to redirect and medicate pt with trazadone 150 mg @ 0029  Will continue pt safety precautions and continual monitoring of mood/thoughts/behavior

## 2021-07-07 NOTE — PROGRESS NOTES
07/07/21 1200   Team Meeting   Meeting Type Tx Team Meeting   Initial Conference Date 07/07/21   Team Members Present   Team Members Present Physician;Nurse;   Physician Team Member Dr Emmer Curling Team Member Yaw Joseph, RN   Social Work Team Member Sophie Hunter [place]   Patient/Family Present   Patient Present No   Patient's Family Present No     Treatment Team members reviewed [address] that SW explained to pt who agreed with goals and signed Plan

## 2021-07-07 NOTE — PLAN OF CARE
Problem: Alteration in Thoughts and Perception  Goal: Treatment Goal: Gain control of psychotic behaviors/thinking, reduce/eliminate presenting symptoms and demonstrate improved reality functioning upon discharge  Outcome: Progressing  Goal: Verbalize thoughts and feelings  Description: Interventions:  - Promote a nonjudgmental and trusting relationship with the patient through active listening and therapeutic communication  - Assess patient's level of functioning, behavior and potential for risk  - Engage patient in 1 on 1 interactions  - Encourage patient to express fears, feelings, frustrations, and discuss symptoms    - [place] patient to reality, help patient recognize reality-based thinking   - Administer medications as ordered and assess for potential side effects  - Provide the patient education related to the signs and symptoms of the illness and desired effects of prescribed medications  Outcome: Progressing  Goal: Refrain from acting on delusional thinking/internal stimuli  Description: Interventions:  - Monitor patient closely, per order   - Utilize least restrictive measures   - Set reasonable limits, give positive feedback for acceptable   - Administer medications as ordered and monitor of potential side effects  Outcome: Progressing  Goal: Agree to be compliant with medication regime, as prescribed and report medication side effects  Description: Interventions:  - Offer appropriate PRN medication and supervise ingestion; conduct AIMS, as needed   Outcome: Progressing  Goal: Recognize dysfunctional thoughts, communicate reality-based thoughts at the time of discharge  Description: Interventions:  - Provide medication and psycho-education to assist patient in compliance and developing insight into his/her illness   Outcome: Progressing

## 2021-07-07 NOTE — NURSING NOTE
At change of shift, [facility] made this nurse aware that pt was upset in her room, banging on wall and scratching her arms  This nurse entered room , pt was scratching her left forearm with her finger nails and crying  Initially pt refused to look at me and answer any questions  Eventually she began to talk about her frustrations with : lack of sleep related to inadequate bed in hospitals, the length of time she spent in the ER, all the noise made by other patients, her home meds were left in the [address], her neighbors all hate her, she needs help in the community, she forgets things, she needs surgery, she has no friends, nobody likes her  She was eventually able to calm down and stopped scratching herself  Agreed to walk into room 243 for decreased stimulation  Initially refused vitals but did then cooperate  I dressed her superficial scratches b/l forearms with dry non stick gauze and wrap  Pt has difficulty dealing with her anger but has agreed to try to talk to staff, utilize coping, go to groups, Shower, listen to music to prevent scratching and throwing things and banging  Pt ambulating steadily on unit with walker  Ate breakfast in dining room with peers  Received call from [facility]  Pt requested clothing and shower  Our pharmacy was made aware that [facility] has her home meds and they will call and have them currired here  Colestid is ordered but home med will need to be sent to our pharmacy to be administered here as we do not carry it  Pt denies si hi and hallucinations  But increased depression and anxiety  Will maintain on safety precautions and continual monitoring

## 2021-07-07 NOTE — PLAN OF CARE
Problem: Alteration in Thoughts and Perception  Goal: Treatment Goal: Gain control of psychotic behaviors/thinking, reduce/eliminate presenting symptoms and demonstrate improved reality functioning upon discharge  Outcome: Progressing  Goal: Verbalize thoughts and feelings  Description: Interventions:  - Promote a nonjudgmental and trusting relationship with the patient through active listening and therapeutic communication  - Assess patient's level of functioning, behavior and potential for risk  - Engage patient in 1 on 1 interactions  - Encourage patient to express fears, feelings, frustrations, and discuss symptoms    - [place] patient to reality, help patient recognize reality-based thinking   - Administer medications as ordered and assess for potential side effects  - Provide the patient education related to the signs and symptoms of the illness and desired effects of prescribed medications  Outcome: Progressing  Goal: Refrain from acting on delusional thinking/internal stimuli  Description: Interventions:  - Monitor patient closely, per order   - Utilize least restrictive measures   - Set reasonable limits, give positive feedback for acceptable   - Administer medications as ordered and monitor of potential side effects  Outcome: Progressing  Goal: Agree to be compliant with medication regime, as prescribed and report medication side effects  Description: Interventions:  - Offer appropriate PRN medication and supervise ingestion; conduct AIMS, as needed   Outcome: Progressing  Goal: Attend and participate in unit activities, including therapeutic, recreational, and educational groups  Description: Interventions:  -Encourage Visitation and family involvement in care  Outcome: Progressing  Goal: Recognize dysfunctional thoughts, communicate reality-based thoughts at the time of discharge  Description: Interventions:  - Provide medication and psycho-education to assist patient in compliance and developing insight into his/her illness   Outcome: Progressing  Goal: Complete daily ADLs, including personal hygiene independently, as able  Description: Interventions:  - Observe, teach, and assist patient with ADLS  - Monitor and promote a balance of rest/activity, with adequate nutrition and elimination   Outcome: Progressing     Problem: Ineffective Coping  Goal: Cooperates with admission process  Description: Interventions:   - Complete admission process  Outcome: Progressing  Goal: Identifies ineffective coping skills  Outcome: Progressing  Goal: Identifies healthy coping skills  Outcome: Progressing  Goal: Demonstrates healthy coping skills  Outcome: Progressing  Goal: Participates in unit activities  Description: Interventions:  - Provide therapeutic environment   - Provide required programming   - Redirect inappropriate behaviors   Outcome: Progressing  Goal: Patient/Family participate in treatment and DC plans  Description: Interventions:  - Provide therapeutic environment  Outcome: Progressing  Goal: Patient/Family verbalizes awareness of resources  Outcome: Progressing  Goal: Understands least restrictive measures  Description: Interventions:  - Utilize least restrictive behavior  Outcome: Progressing  Goal: Free from restraint events  Description: - Utilize least restrictive measures   - Provide behavioral interventions   - Redirect inappropriate behaviors   Outcome: Progressing     Problem: Risk for Self Injury/Neglect  Goal: Treatment Goal: Remain safe during length of stay, learn and adopt new coping skills, and be free of self-injurious ideation, impulses and acts at the time of discharge  Outcome: Progressing  Goal: Verbalize thoughts and feelings  Description: Interventions:  - Assess and re-assess patient's lethality and potential for self-injury  - Engage patient in 1:1 interactions, daily, for a minimum of 15 minutes  - Encourage patient to express feelings, fears, frustrations, hopes  - Establish rapport/trust with patient   Outcome: Progressing  Goal: Refrain from harming self  Description: Interventions:  - Monitor patient closely, per order  - Develop a trusting relationship  - Supervise medication ingestion, monitor effects and side effects   Outcome: Progressing  Goal: Attend and participate in unit activities, including therapeutic, recreational, and educational groups  Description: Interventions:  - Provide therapeutic and educational activities daily, encourage attendance and participation, and document same in the medical record  - Obtain collateral information, encourage visitation and family involvement in care   Outcome: Progressing  Goal: Recognize maladaptive responses and adopt new coping mechanisms  Outcome: Progressing  Goal: Complete daily ADLs, including personal hygiene independently, as able  Description: Interventions:  - Observe, teach, and assist patient with ADLS  - Monitor and promote a balance of rest/activity, with adequate nutrition and elimination  Outcome: Progressing     Problem: Depression  Goal: Treatment Goal: Demonstrate behavioral control of depressive symptoms, verbalize feelings of improved mood/affect, and adopt new coping skills prior to discharge  Outcome: Progressing  Goal: Verbalize thoughts and feelings  Description: Interventions:  - Assess and re-assess patient's level of risk   - Engage patient in 1:1 interactions, daily, for a minimum of 15 minutes   - Encourage patient to express feelings, fears, frustrations, hopes   Outcome: Progressing  Goal: Refrain from harming self  Description: Interventions:  - Monitor patient closely, per order   - Supervise medication ingestion, monitor effects and side effects   Outcome: Progressing  Goal: Refrain from isolation  Description: Interventions:  - Develop a trusting relationship   - Encourage socialization   Outcome: Progressing  Goal: Refrain from self-neglect  Outcome: Progressing  Goal: Attend and participate in unit activities, including therapeutic, recreational, and educational groups  Description: Interventions:  - Provide therapeutic and educational activities daily, encourage attendance and participation, and document same in the medical record   Outcome: Progressing  Goal: Complete daily ADLs, including personal hygiene independently, as able  Description: Interventions:  - Observe, teach, and assist patient with ADLS  -  Monitor and promote a balance of rest/activity, with adequate nutrition and elimination   Outcome: Progressing     Problem: Anxiety  Goal: Anxiety is at manageable level  Description: Interventions:  - Assess and monitor patient's anxiety level  - Monitor for signs and symptoms (heart palpitations, chest pain, shortness of breath, headaches, nausea, feeling jumpy, restlessness, irritable, apprehensive)  - Collaborate with interdisciplinary team and initiate plan and interventions as ordered    - [place] patient to unit/surroundings  - Explain treatment plan  - Encourage participation in care  - Encourage verbalization of concerns/fears  - Identify coping mechanisms  - Assist in developing anxiety-reducing skills  - Administer/offer alternative therapies  - Limit or eliminate stimulants  Outcome: Progressing

## 2021-07-07 NOTE — RESPIRATORY THERAPY NOTE
07/07/21 0320   Respiratory Assessment   Assessment Type Assess only   General Appearance Alert; Awake   Respiratory Pattern Normal   Chest Assessment Chest expansion symmetrical   Bilateral Breath Sounds Diminished;Clear   Resp Comments contacted earlier about pt and no cpap orders placed then called to er and with critical pt, now able to place pt on cpap for hs pt does not know settings placed on cpap [id number]    O2 Device ra cpap    Subjective Data pt was in quite room as pt was agitated prior    Non-Invasive Information   O2 Interface Device Full face mask  (med )   Non-Invasive Ventilation Mode CPAP  (resmed vpap lll)   $ Intermittent NIV Yes   SpO2 98 %   $ Pulse Oximetry Spot Check Charge Completed   Non-Invasive Settings   FiO2 (%) 21   PEEP/CPAP (cm H2O) 10  (pt does not know settings )   Non-Invasive Readings   Total Rate 20   Spontaneous MV (mL) 17   Spontaneous Vt (mL) 937   Leak (lpm) 0   I/E Ratio (Obs) 1:1 7   Skin Intervention Skin intact

## 2021-07-07 NOTE — NURSING NOTE
Pt received from previous shift at 1100am  Affect is flat, mood depressed  Pt ambulates with steady gate using walker  Reports pain in knee and generalized pain due to sleeping in current bed, states "I am used to sleeping in a recliner at home"  PRN baclofen given and volteran gel applied to knee, mild efficacy reported by pt  MD made aware and orders to follow up if pain persists in evening so that another PRN can be added to list due to fears of over sedation if added at current time  Pt made aware and accepting of this  Pt was out of room a good amount of time during day  Xi Castro is to be sending over pts home medication today per [employer]  Will continue to monitor  2100: pt brought medical bed due to pain sleeping in regular bed  Does not present with SI/HI or hallucinations, is anxious and depressed but reports no ideations to harm self at time of assessment  Pt is labile, tearful but compliant with medication  Utilized prn baclofen for muscle pain/spasm, mild efficacy reported  Respiratory brought up CPAP for pt, and pt was observed sleeping

## 2021-07-07 NOTE — PROGRESS NOTES
Patient was admitted with the following belongings     Remains w patient  Pj pants x3   Evans shirt   Purple shirt   Blue   Shirt   X 3 pair of socks   Underwear x6   Black slides   Ankle brace     Patient belongings   [place] backpack with misc stuff   Shoes with [address]   [employer]

## 2021-07-07 NOTE — PROGRESS NOTES
07/07/21 0800   Team Meeting   Meeting Type Daily Rounds   Initial Conference Date 07/07/21   Team Members Present   Team Members Present Physician;Nurse;   Physician Team Member Dr Litzy Victor; Sanaz Andersen, [facility]   Nursing Team Member Bre Polanco, GUS   Social Work Team Member Keri Franco   Patient/Family Present   Patient Present No   Patient's Family Present No     New admit  201  Readmit 25 (red)  Has long history of medical and mental health history  Had agitated episode about her CPAP from home not being here on the unit  Ambulates with a walker  [facility]

## 2021-07-07 NOTE — PROGRESS NOTES
07/07/21 1000   Activity/Group Checklist   Group   (Relaxation and Expression)   Attendance Attended   Attendance Duration (min) 31-45   Interactions Interacted appropriately   Affect/Mood Appropriate;Bright   Goals Achieved Identified feelings; Able to listen to others; Able to engage in interactions; Able to self-disclose

## 2021-07-07 NOTE — TREATMENT PLAN
TREATMENT PLAN REVIEW - [address]  Rachel Parra 26 53 y o  1970 female MRN: [id number]    [address] Room / Bed: [place] [address]/[facility] 901-64 Encounter: [id number]          Admit Date/Time:  7/6/2021  8:41 PM    Treatment Team: Attending Provider: Jaylin Liang MD; Consulting Physician: Eileen Sawyer MD; Patient Care Assistant: Vinny Manning; Certified Nursing Assistant: Cj Dillard; Recreational Therapist: Germania Florez; Care Manager: Rosalee Pressley, RN; Registered Nurse: Antoni Kwong, GUS; : Tish Salgado; Registered Nurse: Kendall Robert RN    Diagnosis: Principal Problem:    Major depressive disorder  Active Problems:    Yan's esophagus determined by biopsy    Benign essential hypertension    Schizoaffective disorder, bipolar type ([facility])    Edema    Hypothyroidism    MAG (obstructive sleep apnea)    Overactive bladder    Primary osteoarthritis of both knees    COPD (chronic obstructive pulmonary disease) ([facility])    Class 3 severe obesity due to excess calories with serious comorbidity and body mass index (BMI) of 40 0 to 44 9 in adult [facility])    Memory difficulties    Medical clearance for psychiatric admission    Substance abuse [facility])      Patient Strengths/Assets: capable of independent living, cooperative, Compliant with medication, community support services      Patient Barriers/Limitations: Chronic mental illness, limited family ties, limited motivation    Short Term Goals: decrease in depressive symptoms, decrease in suicidal thoughts, decrease in level of agitation, mood stabilization    Long Term Goals: stabilization of mood, free of suicidal thoughts, improved insight, appropriate interaction with peers    Progress Towards Goals: starting psychiatric medications as prescribed    Recommended Treatment: medication management, patient medication education, group therapy, milieu therapy, continued Behavioral Health psychiatric evaluation/assessment process    Treatment Frequency: daily medication monitoring, group and milieu therapy daily, monitoring through interdisciplinary rounds, monitoring through weekly patient care conferences    Expected Discharge Date:  7/14/21    Discharge Plan: return to previous living arrangement    Treatment Plan Created/Updated By: Samuel Guzman MD

## 2021-07-07 NOTE — NURSING NOTE
Pt experienced trouble sleeping d/t not having CPAP available  Once RT arrived pt was able to sleep throughout night  Patient observed sleeping for majority of q7 minute checks without s/s of distress  Non-labored breathing noted  Continual monitoring and safety precautions maintained

## 2021-07-07 NOTE — ASSESSMENT & PLAN NOTE
· Patient medically cleared for inpatient behavior health admission the ER  · Patient remains medically cleared for [facility]  · Patient signed a 201 for treatment
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
## 2021-07-07 NOTE — RESPIRATORY THERAPY NOTE
07/07/21 0610   Respiratory Assessment   Assessment Type Assess only   General Appearance Awake;Drowsy   Respiratory Pattern Normal   Chest Assessment Chest expansion symmetrical   Bilateral Breath Sounds Diminished;Clear   Resp Comments pt got aggressive when asked if wanted to come off cpap for day, pt complained about not being on cpap all hs, unable to place on any earklier as with Er pt, pt stated she did not care and not coming off, will have dayshift remove    O2 Device ra cpap    Subjective Data suggested pt have someone bring her own unit in if available    Non-Invasive Information   O2 Interface Device Full face mask  (med )   Non-Invasive Ventilation Mode CPAP  (resmed vpap lll)   SpO2 98 %   $ Pulse Oximetry Spot Check Charge Completed   Non-Invasive Settings   FiO2 (%) 21   PEEP/CPAP (cm H2O) 10   Non-Invasive Readings   Total Rate 16   Spontaneous MV (mL) 15   Spontaneous Vt (mL) 850   Leak (lpm) 6   I/E Ratio (Obs) 1:2 2   Skin Intervention Skin intact

## 2021-07-07 NOTE — DISCHARGE INSTR - OTHER ORDERS
You are being discharged home to [facility] [address] [facility], [address] [place] [address]; [phone] (your cell)  CRISIS PLAN    Stressors you have identified during your hospitalization that led to suicidal thoughts included the following:  recent third anniversary of the loss of your best friend from cancer; some neighbors hateful comments and behavior experienced in your residential building; chronic back and knee pain that disrupt daytime activities and sleep; and increasing anxiety and depression                                                                                                       Coping skills you have identified during your hospitalization include:  Playing two guitars, Zazom art, music, and TV      If you are unable to deal with your distressed mood alone, please contact trustworthy persons and / or contact your therapist     If that is not effective and you feel that you are a danger to yourself and / or others, please call 911 or go to the nearest emergency center or contact resources that include the following:    National Suicide Prevention Lifeline:  9-947-406-161.168.8138  1131   [place]: [address] and Alcohol Commission: 359.570.6167  Peer Hotline (M-F 6-10pm): 8-788.118.2365   [place] Alcohol Anonymous: 410.564.1772    Additionally, a Peer Hotline is also available M-F between the hours of 6-10pm, at 4-720.554.5999

## 2021-07-07 NOTE — CONSULTS
[facility] 1970, 53 y o  female MRN: [id number]  Unit/Bed#: [facility] 247-01 Encounter: [id number]  Primary Care Provider: JHONATAN Liz   Date and time admitted to hospital: 7/6/2021  8:41 PM    Inpatient consult for Medical Clearance for [facility] patient  Consult performed by: José Luis Montero MD  Consult ordered by: Gem Rosales MD          Medical clearance for psychiatric admission  Assessment & Plan  · Patient medically cleared for inpatient behavior health admission the ER  · Patient remains medically cleared for [facility]  · Patient signed a 201 for treatment    Substance abuse ([place] [address] )  Assessment & Plan  · Urine drug screen positive for methamphetamine/amphetamine and THC    Memory difficulties  Assessment & Plan  · Namenda    Class 3 severe obesity due to excess calories with serious comorbidity and body mass index (BMI) of 40 0 to 44 9 in adult [facility])  Assessment & Plan  · Healthy diet recommended    COPD (chronic obstructive pulmonary disease) ([place] [address] )  Assessment & Plan  · Respiratory protocol  · Continue inhalers    Primary osteoarthritis of both knees  Assessment & Plan  · Voltaren gel    Overactive bladder  Assessment & Plan  · Continue oxybutynin    MAG (obstructive sleep apnea)  Assessment & Plan  · CPAP ordered    Hypothyroidism  Assessment & Plan  · Continue levothyroxine    Edema  Assessment & Plan  · Chronic Lasix    Schizoaffective disorder, bipolar type ([facility] )  Assessment & Plan  · Management per inpatient behavior health    Benign essential hypertension  Assessment & Plan  · Continue home meds    Yan's esophagus determined by biopsy  Assessment & Plan  · Continue PPI    * Major depressive disorder  Assessment & Plan  · Management per inpatient behavior health      Recommendations for Discharge:  · Per Primary Service      History of Present Illness:  Jamila Bravo is a 53 y o  female who was originally evaluated at [facility] emergency room secondary to increased depression  She also reported suicidal ideation with no plan  She had no auditory or visual hallucinations in the ER on 07/04 she appeared tearful and depressed  We are consulted for medical clearance  Patient was medically cleared for inpatient behavior health admission the emergency room and remains medically cleared for inpatient behavior health admission  Patient signed a 61 51 81 for treatment    Review of Systems:  Review of Systems   Constitutional: Negative for chills, fatigue and fever  HENT: Negative for ear pain  Eyes: Negative for pain  Respiratory: Negative for cough and shortness of breath  Cardiovascular: Negative for chest pain and palpitations  Gastrointestinal: Negative for abdominal pain  Endocrine: Negative for polyuria  Genitourinary: Negative for dysuria  Skin: Negative for color change and pallor  Neurological: Negative for speech difficulty and headaches  Psychiatric/Behavioral: Negative for confusion  Past Medical and Surgical History:   Past Medical History:   Diagnosis Date    Anxiety     Arthritis     oa cassandra knees    Asthma     good control- no medications    Yan's esophagus     Bipolar affective disorder (HCC)     Chronic pain disorder     lumbar    CPAP (continuous positive airway pressure) dependence     Degenerative disc disease at L5-S1 level     Deliberate self-cutting     Depression 09/16/2008    Disease of thyroid gland     hypo    MARTINEZ (dyspnea on exertion)     Drug overdose 10/28/2008    suicide attempt    Dysphagia     Dyspnea     Edema     BLE    GERD (gastroesophageal reflux disease)     High blood pressure     History of COVID-19 12/30/2020    Symptoms started on 12/30/2020 and then got worse  Today she is feeling a little bit better    She is a candidate for monoclonal antibody infusion and set for 1/6/21 @ 1pm at [facility]  01/07/21 - telemedicine -     Hypertension     Knee pain, bilateral     Especially right    Migraines     Obese     Overactive bladder     Sjogren's disease ([address] )     Sleep apnea     Stress incontinence     Suicidal ideations     Umbilical hernia     surgical repair today 4/24/2019    Use of cane as ambulatory aid     awaiting b/l knee replacement    Wears glasses        Past Surgical History:   Procedure Laterality Date    BREAST BIOPSY Right 1989    benign    CARPAL TUNNEL RELEASE Left     CHOLECYSTECTOMY  05/2003    Laparoscopic    COLONOSCOPY      01/12/2009    DILATION AND CURETTAGE OF UTERUS      ELBOW SURGERY Left     x2  No hardware    ESOPHAGOGASTRODUODENOSCOPY      FOOT SURGERY Left     Plantar fasciotomy    HYSTERECTOMY      laporoscopic, partial    KNEE ARTHROSCOPY Bilateral     OOPHORECTOMY Left 10/2015    [place] ANAL SPHINCTEROTOMY N/A 8/31/2018    Procedure: EUA, LEFT PARTIAL INTERNAL SPHINCTEROTOMY;  Surgeon: Guillaume Morales MD;  Location: [facility] MAIN OR;  Service: Colorectal    [place] GASTROCNEMIUS RECESSION Left 2/24/2021    Procedure: RECESSION GASTROC OPEN;  Surgeon: Kelly Matthews DPM;  Location: [address] [place] [address];  Service: [facility] EOWG,2+D/M,SYWKS N/A 4/24/2019    Procedure: REPAIR HERNIA UMBILICAL LAPAROSCOPIC W/ ROBOTICS;  Surgeon: Vandana Velázquez MD;  [facility]: [facility];  Service: General    REDUCTION MAMMAPLASTY Bilateral 1999    REPAIR LACERATION Left     left hand-5/18/2009    ROTATOR CUFF REPAIR Left     TONSILECTOMY AND ADNOIDECTOMY      TONSILLECTOMY      US GUIDED MSK PROCEDURE  4/22/2021    VEIN LIGATION Bilateral     WISDOM TOOTH EXTRACTION         Meds/Allergies:  all medications and allergies reviewed    Allergies: Allergies   Allergen Reactions    Percocet [Oxycodone-Acetaminophen] Headache     Severe headaches    Betadine [Povidone Iodine] Rash     Unsure if betadine or gauze post surgical  Got rash on leg          Social History:     Marital Status: Single    Substance Use History: Social History     Substance and Sexual Activity   Alcohol Use Not Currently    Comment: Recovering alcoholic     Social History     Tobacco Use   Smoking Status Former Smoker    Packs/day: 3 00    Types: Cigarettes    Quit date: 1/2/2018    Years since quitting: 3 5   Smokeless Tobacco Never Used   Tobacco Comment    Started at age 14  Social History     Substance and Sexual Activity   Drug Use Yes    Types: Marijuana    Comment: medical- vapes 3 times per wk at hs       Family History:  I have reviewed the patients family history    Physical Exam:   Vitals:   Blood Pressure: 142/90 (07/06/21 2354)  Pulse: 78 (07/06/21 2354)  Temperature: 97 6 °F (36 4 °C) (07/06/21 2000)  Temp Source: Temporal (07/06/21 2000)  Respirations: 18 (07/06/21 2000)  Weight - Scale: 124 kg (273 lb 5 9 oz) (07/07/21 0021)  SpO2: 98 % (07/06/21 2000)    Physical Exam  Vitals reviewed  Constitutional:       General: She is not in acute distress  Appearance: She is obese  She is not ill-appearing  HENT:      Head: Normocephalic  Nose: Nose normal       Mouth/Throat:      Mouth: Mucous membranes are moist    Eyes:      General: No scleral icterus  Cardiovascular:      Rate and Rhythm: Normal rate and regular rhythm  Pulmonary:      Effort: Pulmonary effort is normal  No respiratory distress  Abdominal:      Palpations: Abdomen is soft  Tenderness: There is no abdominal tenderness  Skin:     General: Skin is warm  Neurological:      Mental Status: She is alert and oriented to person, place, and time     Psychiatric:         Mood and Affect: Mood normal          Behavior: Behavior normal          Additional Data:   Lab Results: Urine drug screen positive for amphetamine/methamphetamine and THC              Invalid input(s): LABALBU          Lab Results   Component Value Date/Time    HGBA1C 4 9 12/18/2020 09:07 AM    HGBA1C 5 3 06/26/2020 03:37 PM    HGBA1C 5 4 10/16/2019 10:52 AM       ** Please Note: This note has been constructed using a voice recognition system   **

## 2021-07-08 LAB
ANION GAP SERPL CALCULATED.3IONS-SCNC: 9 MMOL/L (ref 4–13)
BUN SERPL-MCNC: 8 MG/DL (ref 7–25)
CALCIUM SERPL-MCNC: 8.8 MG/DL (ref 8.6–10.5)
CHLORIDE SERPL-SCNC: 106 MMOL/L (ref 98–107)
CO2 SERPL-SCNC: 25 MMOL/L (ref 21–31)
CREAT SERPL-MCNC: 0.58 MG/DL (ref 0.6–1.2)
GFR SERPL CREATININE-BSD FRML MDRD: 109 ML/MIN/1.73SQ M
GLUCOSE SERPL-MCNC: 105 MG/DL (ref 65–99)
MAGNESIUM SERPL-MCNC: 2 MG/DL (ref 1.9–2.7)
POTASSIUM SERPL-SCNC: 3.9 MMOL/L (ref 3.5–5.5)
SODIUM SERPL-SCNC: 140 MMOL/L (ref 134–143)

## 2021-07-08 PROCEDURE — 80048 BASIC METABOLIC PNL TOTAL CA: CPT | Performed by: STUDENT IN AN ORGANIZED HEALTH CARE EDUCATION/TRAINING PROGRAM

## 2021-07-08 PROCEDURE — 94660 CPAP INITIATION&MGMT: CPT

## 2021-07-08 PROCEDURE — 94760 N-INVAS EAR/PLS OXIMETRY 1: CPT

## 2021-07-08 PROCEDURE — 83735 ASSAY OF MAGNESIUM: CPT | Performed by: STUDENT IN AN ORGANIZED HEALTH CARE EDUCATION/TRAINING PROGRAM

## 2021-07-08 PROCEDURE — 99232 SBSQ HOSP IP/OBS MODERATE 35: CPT | Performed by: NURSE PRACTITIONER

## 2021-07-08 RX ADMIN — LURASIDONE HYDROCHLORIDE 60 MG: 40 TABLET, FILM COATED ORAL at 08:36

## 2021-07-08 RX ADMIN — COLESTIPOL HYDROCHLORIDE 1 G: 1 TABLET ORAL at 09:10

## 2021-07-08 RX ADMIN — Medication 1000 UNITS: at 08:36

## 2021-07-08 RX ADMIN — PANTOPRAZOLE SODIUM 20 MG: 20 TABLET, DELAYED RELEASE ORAL at 17:36

## 2021-07-08 RX ADMIN — AMLODIPINE BESYLATE 5 MG: 5 TABLET ORAL at 08:37

## 2021-07-08 RX ADMIN — FLUTICASONE PROPIONATE 2 PUFF: 110 AEROSOL, METERED RESPIRATORY (INHALATION) at 21:44

## 2021-07-08 RX ADMIN — MEMANTINE 10 MG: 5 TABLET ORAL at 08:37

## 2021-07-08 RX ADMIN — FUROSEMIDE 20 MG: 40 TABLET ORAL at 08:37

## 2021-07-08 RX ADMIN — DICLOFENAC SODIUM 2 G: 10 GEL TOPICAL at 21:43

## 2021-07-08 RX ADMIN — OXYBUTYNIN CHLORIDE 5 MG: 5 TABLET, EXTENDED RELEASE ORAL at 08:35

## 2021-07-08 RX ADMIN — PILOCARPINE HYDROCHLORIDE 5 MG: 5 TABLET, FILM COATED ORAL at 08:35

## 2021-07-08 RX ADMIN — COLESTIPOL HYDROCHLORIDE 1 G: 1 TABLET ORAL at 18:14

## 2021-07-08 RX ADMIN — LOSARTAN POTASSIUM 50 MG: 25 TABLET, FILM COATED ORAL at 08:35

## 2021-07-08 RX ADMIN — DIVALPROEX SODIUM 750 MG: 500 TABLET, EXTENDED RELEASE ORAL at 21:20

## 2021-07-08 RX ADMIN — METOPROLOL TARTRATE 25 MG: 25 TABLET, FILM COATED ORAL at 08:35

## 2021-07-08 RX ADMIN — MEMANTINE 10 MG: 5 TABLET ORAL at 17:33

## 2021-07-08 RX ADMIN — BUPROPION 450 MG: 150 TABLET, EXTENDED RELEASE ORAL at 08:36

## 2021-07-08 RX ADMIN — LEVOTHYROXINE SODIUM 50 MCG: 25 TABLET ORAL at 06:17

## 2021-07-08 RX ADMIN — DICLOFENAC SODIUM 2 G: 10 GEL TOPICAL at 17:37

## 2021-07-08 RX ADMIN — FLUTICASONE PROPIONATE 2 PUFF: 110 AEROSOL, METERED RESPIRATORY (INHALATION) at 08:41

## 2021-07-08 RX ADMIN — TRAZODONE HYDROCHLORIDE 150 MG: 150 TABLET ORAL at 22:10

## 2021-07-08 RX ADMIN — PILOCARPINE HYDROCHLORIDE 5 MG: 5 TABLET, FILM COATED ORAL at 17:32

## 2021-07-08 RX ADMIN — TOPIRAMATE 100 MG: 25 TABLET, FILM COATED ORAL at 17:30

## 2021-07-08 RX ADMIN — MELATONIN 3 MG: at 21:20

## 2021-07-08 RX ADMIN — DICLOFENAC SODIUM 2 G: 10 GEL TOPICAL at 11:32

## 2021-07-08 RX ADMIN — TOPIRAMATE 100 MG: 25 TABLET, FILM COATED ORAL at 08:35

## 2021-07-08 RX ADMIN — PANTOPRAZOLE SODIUM 20 MG: 20 TABLET, DELAYED RELEASE ORAL at 08:35

## 2021-07-08 RX ADMIN — DICLOFENAC SODIUM 2 G: 10 GEL TOPICAL at 08:41

## 2021-07-08 RX ADMIN — HYDROXYZINE HYDROCHLORIDE 50 MG: 25 TABLET ORAL at 19:09

## 2021-07-08 NOTE — PROGRESS NOTES
07/08/21 0730   Activity/Group Checklist   Group   (Goal Planning and Communication)   Attendance Attended   Attendance Duration (min) 46-60   Interactions Interacted appropriately   Affect/Mood Appropriate;Calm   Goals Achieved Identified feelings; Discussed coping strategies; Able to listen to others; Able to engage in interactions

## 2021-07-08 NOTE — SOCIAL WORK
SW met with pt who expressed that she is feeling improved, having related that she slept well and is not preoccupied with worry about her cat's welfare as her father is taking good care of him  Pt expressed intention to participate in groups, despite not being a "group" person

## 2021-07-08 NOTE — PLAN OF CARE
Problem: Alteration in Thoughts and Perception  Goal: Treatment Goal: Gain control of psychotic behaviors/thinking, reduce/eliminate presenting symptoms and demonstrate improved reality functioning upon discharge  Outcome: Progressing  Goal: Verbalize thoughts and feelings  Description: Interventions:  - Promote a nonjudgmental and trusting relationship with the patient through active listening and therapeutic communication  - Assess patient's level of functioning, behavior and potential for risk  - Engage patient in 1 on 1 interactions  - Encourage patient to express fears, feelings, frustrations, and discuss symptoms    - [place] patient to reality, help patient recognize reality-based thinking   - Administer medications as ordered and assess for potential side effects  - Provide the patient education related to the signs and symptoms of the illness and desired effects of prescribed medications  Outcome: Progressing  Goal: Refrain from acting on delusional thinking/internal stimuli  Description: Interventions:  - Monitor patient closely, per order   - Utilize least restrictive measures   - Set reasonable limits, give positive feedback for acceptable   - Administer medications as ordered and monitor of potential side effects  Outcome: Progressing  Goal: Agree to be compliant with medication regime, as prescribed and report medication side effects  Description: Interventions:  - Offer appropriate PRN medication and supervise ingestion; conduct AIMS, as needed   Outcome: Progressing  Goal: Attend and participate in unit activities, including therapeutic, recreational, and educational groups  Description: Interventions:  -Encourage Visitation and family involvement in care  Outcome: Progressing  Goal: Recognize dysfunctional thoughts, communicate reality-based thoughts at the time of discharge  Description: Interventions:  - Provide medication and psycho-education to assist patient in compliance and developing insight into his/her illness   Outcome: Progressing  Goal: Complete daily ADLs, including personal hygiene independently, as able  Description: Interventions:  - Observe, teach, and assist patient with ADLS  - Monitor and promote a balance of rest/activity, with adequate nutrition and elimination   Outcome: Progressing

## 2021-07-08 NOTE — PROGRESS NOTES
Progress Note - [address] [place] 53 y o  female MRN: [id number]  Unit/Bed#: [facility] 247-01 Encounter: [id number]    Assessment/Plan   Principal Problem:    Major depressive disorder  Active Problems:    Yan's esophagus determined by biopsy    Benign essential hypertension    Schizoaffective disorder, bipolar type ([facility])    Edema    Hypothyroidism    MAG (obstructive sleep apnea)    Overactive bladder    Primary osteoarthritis of both knees    COPD (chronic obstructive pulmonary disease) ([facility])    Class 3 severe obesity due to excess calories with serious comorbidity and body mass index (BMI) of 40 0 to 44 9 in adult [facility])    Memory difficulties    Medical clearance for psychiatric admission    Substance abuse ([facility] [address] )  Patient was seen for continuing care and treatment  Case was discussed with the treatment team   On examination today, the patient is feeling better since admission  She said since her medications were adjusted slightly, there is improvement in her mood  She is reporting no side symptoms  She still feels sad and overwhelmed and anxious at times but there is some improvement  Discharge planning and disposition is ongoing      Behavior over the last 24 hours:  improved  Sleep: normal  Appetite: normal  Medication side effects: No  ROS: no complaints    Mental Status Evaluation:  Appearance:  age appropriate and casually dressed   Behavior:  psychomotor retardation   Speech:  normal pitch and normal volume   Mood:  anxious and depressed   Affect:  constricted   Thought Process:  circumstantial   Thought Content:  normal   Perceptual Disturbances: None   Risk Potential: Suicidal Ideations none  Homicidal Ideations none  Potential for Aggression No   Sensorium:  person, place, time/date and situation   Memory:  recent and remote memory grossly intact   Consciousness:  alert and awake    Attention: attention span appeared shorter than expected for age   Insight:  fair   Judgment: fair Gait/Station: normal gait/station   Motor Activity: no abnormal movements     Progress Toward Goals:  Mood is slightly improved since meds adjusted    Recommended Treatment: Continue with group therapy, milieu therapy and occupational therapy  Risks, benefits and possible side effects of Medications:   Risks, benefits, and possible side effects of medications explained to patient and patient verbalizes understanding  Medications: Wellbutrin  milligram daily  Depakote 750 milligram HS  Latuda 60 milligram daily  Topamax 100 milligram b i d  Labs: I have personally reviewed all pertinent laboratory/tests results  Most Recent Labs:   Lab Results   Component Value Date    WBC 10 44 (H) 03/03/2021    RBC 4 57 03/03/2021    HGB 13 7 03/03/2021    HCT 43 4 03/03/2021     03/03/2021    RDW 12 5 03/03/2021    NEUTROABS 5 80 03/03/2021    SODIUM 140 07/08/2021    K 3 9 07/08/2021     07/08/2021    CO2 25 07/08/2021    BUN 8 07/08/2021    CREATININE 0 58 (L) 07/08/2021    GLUC 105 (H) 07/08/2021    GLUF 91 02/17/2021    CALCIUM 8 8 07/08/2021    AST 13 02/17/2021    ALT 16 02/17/2021    ALKPHOS 68 02/17/2021    TP 7 5 02/17/2021    ALB 3 7 02/17/2021    TBILI 0 36 02/17/2021    CHOLESTEROL 265 (H) 02/17/2021    HDL 74 02/17/2021    TRIG 130 02/17/2021    LDLCALC 165 (H) 02/17/2021    NONHDLC 191 02/17/2021    VALPROICTOT 28 (L) 10/16/2019    HUH9CXRVHBAK 5 190 (H) 02/17/2021    FREET4 0 90 02/17/2021    T3FREE 2 27 (L) 10/16/2019    PREGSERUM Negative 05/24/2019    RPR Non-Reactive 05/24/2019    HGBA1C 4 9 12/18/2020    EAG 94 12/18/2020       Counseling / Coordination of Care  Total floor / unit time spent today 25 minutes  Greater than 50% of total time was spent with the patient and / or family counseling and / or coordination of care   A description of the counseling / coordination of care:  Medication management, chart review and treatment

## 2021-07-08 NOTE — PROGRESS NOTES
07/08/21 1614   Team Meeting   Meeting Type Daily Rounds   Initial Conference Date 07/08/21   Team Members Present   Team Members Present Physician;Nurse;   Physician Team Member Hannah Warner, [address] [place] [address]   Nursing Team Member Diana Phelan, RN   Social Work Team Member Perla Gutiérrez [place]   Patient/Family Present   Patient Present No   Patient's Family Present No     JHONATAN Trevino, also participated  Anxious  Feels she needs hospital bed  Denies SI  No AVH  Labs drawn today  Slept all night
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
## 2021-07-08 NOTE — PLAN OF CARE
Problem: DISCHARGE PLANNING - CARE MANAGEMENT  Goal: Discharge to post-acute care or home with appropriate resources  Description: INTERVENTIONS:  - Conduct assessment to determine patient/family and health care team treatment goals, and need for post-acute services based on payer coverage, community resources, and patient preferences, and barriers to discharge  - Address psychosocial, clinical, and financial barriers to discharge as identified in assessment in conjunction with the patient/family and health care team  - Arrange appropriate level of post-acute services according to patients   needs and preference and payer coverage in collaboration with the physician and health care team  - Communicate with and update the patient/family, physician, and health care team regarding progress on the discharge plan  - Arrange appropriate transportation to post-acute venues  Outcome: Progressing     SW met with pt who expressed that she is feeling improved, having related that she slept well and is not preoccupied with worry about her cat's welfare as her father is taking good care of him  Pt expressed intention to participate in groups, despite not being a "group" person

## 2021-07-08 NOTE — NURSING NOTE
Slept well throughout the night  Respirations even and unlabored  Safety measures maintained  Continual observation continues  Will continue to monitor

## 2021-07-08 NOTE — PROGRESS NOTES
07/08/21 1000   Activity/Group Checklist   Group   (Community Building and Teamwork)   Attendance Attended   Attendance Duration (min) Greater than 60   Interactions Interacted appropriately   Affect/Mood Appropriate;Calm   Goals Achieved Identified feelings; Identified triggers; Discussed coping strategies; Able to listen to others; Able to engage in interactions
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
## 2021-07-09 PROCEDURE — 94760 N-INVAS EAR/PLS OXIMETRY 1: CPT

## 2021-07-09 PROCEDURE — 99232 SBSQ HOSP IP/OBS MODERATE 35: CPT | Performed by: HOSPITALIST

## 2021-07-09 PROCEDURE — 94660 CPAP INITIATION&MGMT: CPT

## 2021-07-09 RX ADMIN — FLUTICASONE PROPIONATE 2 PUFF: 110 AEROSOL, METERED RESPIRATORY (INHALATION) at 11:45

## 2021-07-09 RX ADMIN — METOPROLOL TARTRATE 25 MG: 25 TABLET, FILM COATED ORAL at 08:37

## 2021-07-09 RX ADMIN — IBUPROFEN 600 MG: 600 TABLET, FILM COATED ORAL at 11:51

## 2021-07-09 RX ADMIN — MEMANTINE 10 MG: 5 TABLET ORAL at 18:28

## 2021-07-09 RX ADMIN — FUROSEMIDE 20 MG: 40 TABLET ORAL at 08:38

## 2021-07-09 RX ADMIN — TOPIRAMATE 100 MG: 25 TABLET, FILM COATED ORAL at 18:27

## 2021-07-09 RX ADMIN — LEVOTHYROXINE SODIUM 50 MCG: 25 TABLET ORAL at 06:07

## 2021-07-09 RX ADMIN — LURASIDONE HYDROCHLORIDE 60 MG: 40 TABLET, FILM COATED ORAL at 08:38

## 2021-07-09 RX ADMIN — DICLOFENAC SODIUM 2 G: 10 GEL TOPICAL at 18:32

## 2021-07-09 RX ADMIN — TOPIRAMATE 100 MG: 25 TABLET, FILM COATED ORAL at 08:37

## 2021-07-09 RX ADMIN — HYDROXYZINE HYDROCHLORIDE 100 MG: 25 TABLET ORAL at 22:10

## 2021-07-09 RX ADMIN — COLESTIPOL HYDROCHLORIDE 1 G: 1 TABLET ORAL at 11:40

## 2021-07-09 RX ADMIN — Medication 1000 UNITS: at 08:38

## 2021-07-09 RX ADMIN — OXYBUTYNIN CHLORIDE 5 MG: 5 TABLET, EXTENDED RELEASE ORAL at 08:38

## 2021-07-09 RX ADMIN — PANTOPRAZOLE SODIUM 20 MG: 20 TABLET, DELAYED RELEASE ORAL at 06:07

## 2021-07-09 RX ADMIN — PANTOPRAZOLE SODIUM 20 MG: 20 TABLET, DELAYED RELEASE ORAL at 18:30

## 2021-07-09 RX ADMIN — AMLODIPINE BESYLATE 5 MG: 5 TABLET ORAL at 08:37

## 2021-07-09 RX ADMIN — MELATONIN 3 MG: at 21:21

## 2021-07-09 RX ADMIN — TRAZODONE HYDROCHLORIDE 150 MG: 150 TABLET ORAL at 21:20

## 2021-07-09 RX ADMIN — MEMANTINE 10 MG: 5 TABLET ORAL at 08:38

## 2021-07-09 RX ADMIN — DIVALPROEX SODIUM 750 MG: 500 TABLET, EXTENDED RELEASE ORAL at 21:20

## 2021-07-09 RX ADMIN — COLESTIPOL HYDROCHLORIDE 1 G: 1 TABLET ORAL at 18:48

## 2021-07-09 RX ADMIN — METOPROLOL TARTRATE 25 MG: 25 TABLET, FILM COATED ORAL at 18:27

## 2021-07-09 RX ADMIN — DICLOFENAC SODIUM 2 G: 10 GEL TOPICAL at 11:43

## 2021-07-09 RX ADMIN — PILOCARPINE HYDROCHLORIDE 5 MG: 5 TABLET, FILM COATED ORAL at 18:28

## 2021-07-09 RX ADMIN — PILOCARPINE HYDROCHLORIDE 5 MG: 5 TABLET, FILM COATED ORAL at 08:37

## 2021-07-09 RX ADMIN — LOSARTAN POTASSIUM 50 MG: 25 TABLET, FILM COATED ORAL at 08:37

## 2021-07-09 RX ADMIN — BUPROPION 450 MG: 150 TABLET, EXTENDED RELEASE ORAL at 08:37

## 2021-07-09 NOTE — PROGRESS NOTES
Social with peers and out in the community  Feels safe on unit     No suicidal ideations noted  Tearful episode at HS due to not having CPAP on at bedtime  RT called and CPAP intact at 2130  Patient calmed after extensive 1 to 1 with RN  Compliant with medications and snacks  No aspiration risks noted  Trazodone 150 mg given PO at HS for insomnia  Fluids at bedside to promote hydration  Maintained on q 7 minute checks  Will continue to monitor

## 2021-07-09 NOTE — PROGRESS NOTES
07/09/21 1000   Activity/Group Checklist   Group   ([employer] [facility] )   Attendance Attended   Attendance Duration (min) Greater than 60   Interactions Interacted appropriately   Affect/Mood Appropriate   Goals Achieved Identified feelings; Identified triggers; Discussed coping strategies; Able to listen to others; Able to engage in interactions; Able to reflect/comment on own behavior;Able to manage/cope with feelings

## 2021-07-09 NOTE — PROGRESS NOTES
07/09/21 1330   Activity/Group Checklist   Group   ([employer])   Attendance Attended   Attendance Duration (min) Greater than 60   Interactions Interacted appropriately   Affect/Mood Appropriate;Bright   Goals Achieved Identified feelings; Able to listen to others; Able to engage in interactions

## 2021-07-09 NOTE — PROGRESS NOTES
07/09/21 0800   Team Meeting   Meeting Type Daily Rounds   Initial Conference Date 07/09/21   Team Members Present   Team Members Present Physician;Nurse;   Physician Team Member Dr Tolentino Laughter; Jacob Flores, [address] [place] [address]   Nursing Team Member Adalberto Baer, RN   Social Work Team Member Keri Sullivan   Patient/Family Present   Patient Present No   Patient's Family Present No     Reporting feeling slightly improved with depression and anxiety  Tearful episode at night due to not having her CPAP in the hospital  Slept  Med compliant  Potential discharge on Monday or Tuesday pending weekend progress   Potential PHP referral

## 2021-07-09 NOTE — PROGRESS NOTES
Progress Note - [address] [facility] [place] 53 y o  female MRN: [id number]   Unit/Bed#: [facility] 247-01 Encounter: [id number]    Behavior over the last 24 hours:      Roel Calvillo was seen for an inpatient follow-up psychiatric visit this date  She relates she is feeling better  Her anxiety and depression are decreasing  She denies any suicidal or homicidal ideation at this time  She continues to take her medications as prescribed  She attends group programming and is appropriate with staff and peers  Her mood continues to improve on current medications and she is progressing  ROS: no complaints, all other systems are negative    Mental Status Evaluation:    Appearance:  casually dressed, dressed appropriately   Behavior:  cooperative, calm   Speech:  normal rate and volume   Mood:  depressed, anxious   Affect:  normal range and intensity   Thought Process:  organized, logical, coherent   Associations: intact associations   Thought Content:  normal, no overt delusions   Perceptual Disturbances: none   Risk Potential: Suicidal ideation - None  Homicidal ideation - None  Potential for aggression - No   Sensorium:  oriented to person, place and time/date   Memory:  recent and remote memory grossly intact   Consciousness:  alert and awake   Attention: decreased concentration and decreased attention span   Insight:  fair   Judgment: fair   Gait/Station: normal gait/station, normal balance   Motor Activity: no abnormal movements     Vital signs in last 24 hours:    Temp:  [97 °F (36 1 °C)-97 9 °F (36 6 °C)] 97 9 °F (36 6 °C)  HR:  [64-65] 64  Resp:  [16] 16  BP: (102-130)/(56-65) 130/65    Laboratory results:  I have personally reviewed all pertinent laboratory/tests results      Progress Toward Goals: progressing    Assessment/Plan   Principal Problem:    Major depressive disorder  Active Problems:    Yan's esophagus determined by biopsy    Benign essential hypertension    Schizoaffective disorder, bipolar type ([facility] )    Edema    Hypothyroidism    MAG (obstructive sleep apnea)    Overactive bladder    Primary osteoarthritis of both knees    COPD (chronic obstructive pulmonary disease) ([facility])    Class 3 severe obesity due to excess calories with serious comorbidity and body mass index (BMI) of 40 0 to 44 9 in adult [facility])    Memory difficulties    Medical clearance for psychiatric admission    Substance abuse ([facility] )    Recommended Treatment:     Continue current medications as prescribed  Continue to monitor  Discharge disposition and planning are ongoing        All current active medications have been reviewed  Encourage group therapy, milieu therapy and occupational therapy  Behavioral Health checks every 7 minutes    Current Facility-Administered Medications   Medication Dose Route Frequency Provider Last Rate    albuterol  2 puff Inhalation Q4H PRN Arville Costain, PA-C      amLODIPine  5 mg Oral Daily Arville Costain, PA-C      baclofen  10 mg Oral TID PRN Arville Costain, PA-C      benztropine  1 mg Oral Q4H PRN Max 6/day Alok Padilla MD      buPROPion  450 mg Oral Daily Last Swanson MD      cholecalciferol  1,000 Units Oral Daily Arville Costain, PA-C      colestipol  1 g Oral BID Arville Costain, PA-C      Diclofenac Sodium  2 g Topical 4x Daily Arville Costain, PA-C      hydrOXYzine HCL  50 mg Oral Q6H PRN Max 4/day Alok Padilla MD      Or   [facility] diphenhydrAMINE  50 mg Intramuscular Q6H PRN Alok Padilla MD      divalproex sodium  750 mg Oral HS Last Swanson MD      fluticasone  2 puff Inhalation BID Nolan Duval MD      furosemide  20 mg Oral Daily Arville Costain, PA-C      hydrOXYzine HCL  100 mg Oral Q6H PRN Max 4/day Alok Padilla MD      Or   [facility] LORazepam  2 mg Intramuscular Q6H PRN Alok Padilla MD      hydrOXYzine HCL  25 mg Oral Q6H PRN Max 4/day Alok Padilla MD      ibuprofen  400 mg Oral Q4H PRN Alok Padilla MD      ibuprofen  600 mg Oral Q6H PRN Alok Padilla MD     [facility] ibuprofen  800 mg Oral Q8H PRN Birthjob Maria MD      levothyroxine  50 mcg Oral Early Morning Ezequiel Burnette, ERICKSON      losartan  50 mg Oral Daily Ezequiel Burnette, ERICKSON      lurasidone  60 mg Oral Daily With Breakfast Merlin Maria MD      melatonin  3 mg Oral HS Birthjob Maria MD      memantine  10 mg Oral BID Ezequiel Burnette, PA-C      metoprolol tartrate  25 mg Oral BID Ezequiel Burnette, PA-C      OLANZapine  5 mg Oral Q4H PRN Max 3/day Merlin Maria MD      Or   [facility] OLANZapine  2 5 mg Intramuscular Q4H PRN Max 3/day Birthjob Maria MD      OLANZapine  5 mg Oral Q3H PRN Max 3/day Merlin Maria MD      Or    OLANZapine  5 mg Intramuscular Q3H PRN Max 3/day Merlin Maria MD      OLANZapine  2 5 mg Oral Q4H PRN Max 6/day Birthjob Maria MD      oxybutynin  5 mg Oral Daily Ezequiel Burnette, PA-C      pantoprazole  20 mg Oral BID AC Ezequiel Burnette, PA-C      pilocarpine  5 mg Oral BID Ezequiel Burnette, PA-C      topiramate  100 mg Oral BID Ezequiel Burnette, PA-C      traZODone  150 mg Oral HS PRN Birthjob Maria MD         Risks / Benefits of Treatment:    Risks, benefits, and possible side effects of medications explained to patient and patient verbalizes understanding and agreement for treatment  Counseling / Coordination of Care:      Patient's progress discussed with staff in treatment team meeting  Medications, treatment progress and treatment plan reviewed with patient

## 2021-07-09 NOTE — PROGRESS NOTES
C-Pap intact  No respiratory distress overnight  No tearful episodes or self abusive behaviors noted  Trazodone given at HS effective  HOB in semi-fowlers  Fluids at beside to promote hydration  Maintained on q 7 minute checks

## 2021-07-09 NOTE — DISCHARGE INSTR - APPOINTMENTS
The treatment team recommends that you obtain ongoing psychiatric medication management and individual therapy  A telehealth phone appointment has been scheduled in your behalf on Monday, August 2, 2021 at 11:15 am, with Dr Fredy Rapp, psychiatrist at [facility], [address]; Phone:  [phone]; Fax:  [phone]  Please promptly answer Dr Odette Thomas call to you  Your summary of care will be faxed to this provider for continuity of care  An office appointment has been scheduled in your behalf on Thursday, July 15, 2021 at 10:30 am, with Ruth Mccall, your new therapist, at [facility], [address]; Phone:  [phone]; Fax:  [phone]  Please arrive early and bring your photo ID and insurance cards  Your summary of care will be faxed to this provider for continuity of care  A telehealth virtual appointment has been scheduled in your behalf on Wednesday, July 14, 2021, at 2:30 pm, with your targeted  (TCM), Sayra Rey, from [facility]), [address], [address] [address],[address], [address]; Phone:  [phone]; Fax:  [phone]  Please be ready for [facility] [employer] virtual contact with you  Your summary of care will be faxed to this provider for continuity of care  A home visit appointment will be scheduled in your behalf by Yohan Miranda, your Peer Specialist, from [address] [place] [address] [facility]), [address], [address]; Phone:  [phone]; Fax:  [phone]  Please be ready for Claire's home visit to you within five business days of your hospital discharge  Your summary of care will be faxed to this provider for continuity of care      You declined the scheduling of an appointment with your primary care physician provider, JHONATAN Randhawa, at [address] [facility], [address] [address]; Phone: [phone]; Fax:  [phone]  At your request, your summary will be faxed to this provider for continuity of care  You declined a referral for receipt of substance abuse counseling  If you change your mind, please consider contacting [facility], [address] [facility], [address] [place] [address],[address] [place], [address] [place] [address]; Phone:  [phone]  The staff at this agency can assist you with obtaining appropriate services  Please note that Dottie Ash RN, our [facility] Nurse Navigator, will be calling you after your discharge, on the phone number that you provided  She will be available as an additional support, if needed  If you wish to speak with her, you may contact Monique White at [phone]

## 2021-07-09 NOTE — PROGRESS NOTES
07/09/21 0730   Activity/Group Checklist   Group   (Goal Planning and Communication)   Attendance Attended   Attendance Duration (min) 46-60   Interactions Interacted appropriately   Affect/Mood Appropriate;Calm   Goals Achieved Identified feelings; Discussed coping strategies; Able to listen to others; Able to engage in interactions

## 2021-07-09 NOTE — PROGRESS NOTES
07/09/21 1300   Activity/Group Checklist   Group   (Group Exercise)   Attendance Attended   Attendance Duration (min) 16-30   Interactions Interacted appropriately   Affect/Mood Bright   Goals Achieved Able to engage in interactions

## 2021-07-09 NOTE — NURSING NOTE
Pt affect broad, mood pleasant and cooperative  C/o right knee pain/swelling 7/10 and left neck pain 4/10  PRN ibuprofen 600mg given, efficacy pending  Denies SI/HI or hallucinations  Reports some mild anxiety and depression  Will continue to monitor

## 2021-07-10 LAB — VALPROATE SERPL-MCNC: 35.6 UG/ML (ref 50–125)

## 2021-07-10 PROCEDURE — 99232 SBSQ HOSP IP/OBS MODERATE 35: CPT | Performed by: NURSE PRACTITIONER

## 2021-07-10 PROCEDURE — 94760 N-INVAS EAR/PLS OXIMETRY 1: CPT

## 2021-07-10 PROCEDURE — 80164 ASSAY DIPROPYLACETIC ACD TOT: CPT | Performed by: HOSPITALIST

## 2021-07-10 PROCEDURE — 94660 CPAP INITIATION&MGMT: CPT

## 2021-07-10 RX ADMIN — OXYBUTYNIN CHLORIDE 5 MG: 5 TABLET, EXTENDED RELEASE ORAL at 09:08

## 2021-07-10 RX ADMIN — TRAZODONE HYDROCHLORIDE 150 MG: 150 TABLET ORAL at 21:29

## 2021-07-10 RX ADMIN — METOPROLOL TARTRATE 25 MG: 25 TABLET, FILM COATED ORAL at 17:48

## 2021-07-10 RX ADMIN — DICLOFENAC SODIUM 2 G: 10 GEL TOPICAL at 17:50

## 2021-07-10 RX ADMIN — PILOCARPINE HYDROCHLORIDE 5 MG: 5 TABLET, FILM COATED ORAL at 09:08

## 2021-07-10 RX ADMIN — FUROSEMIDE 20 MG: 40 TABLET ORAL at 09:08

## 2021-07-10 RX ADMIN — TOPIRAMATE 100 MG: 25 TABLET, FILM COATED ORAL at 17:48

## 2021-07-10 RX ADMIN — BUPROPION 450 MG: 150 TABLET, EXTENDED RELEASE ORAL at 09:07

## 2021-07-10 RX ADMIN — Medication 1000 UNITS: at 09:10

## 2021-07-10 RX ADMIN — HYDROXYZINE HYDROCHLORIDE 100 MG: 25 TABLET ORAL at 14:31

## 2021-07-10 RX ADMIN — TOPIRAMATE 100 MG: 25 TABLET, FILM COATED ORAL at 09:07

## 2021-07-10 RX ADMIN — PANTOPRAZOLE SODIUM 20 MG: 20 TABLET, DELAYED RELEASE ORAL at 09:50

## 2021-07-10 RX ADMIN — DICLOFENAC SODIUM 2 G: 10 GEL TOPICAL at 09:51

## 2021-07-10 RX ADMIN — LURASIDONE HYDROCHLORIDE 60 MG: 40 TABLET, FILM COATED ORAL at 09:05

## 2021-07-10 RX ADMIN — FLUTICASONE PROPIONATE 2 PUFF: 110 AEROSOL, METERED RESPIRATORY (INHALATION) at 09:51

## 2021-07-10 RX ADMIN — COLESTIPOL HYDROCHLORIDE 1 G: 1 TABLET ORAL at 17:50

## 2021-07-10 RX ADMIN — DICLOFENAC SODIUM 2 G: 10 GEL TOPICAL at 13:07

## 2021-07-10 RX ADMIN — METOPROLOL TARTRATE 25 MG: 25 TABLET, FILM COATED ORAL at 09:08

## 2021-07-10 RX ADMIN — AMLODIPINE BESYLATE 5 MG: 5 TABLET ORAL at 09:09

## 2021-07-10 RX ADMIN — LOSARTAN POTASSIUM 50 MG: 25 TABLET, FILM COATED ORAL at 09:09

## 2021-07-10 RX ADMIN — MEMANTINE 10 MG: 5 TABLET ORAL at 17:48

## 2021-07-10 RX ADMIN — MEMANTINE 10 MG: 5 TABLET ORAL at 09:07

## 2021-07-10 RX ADMIN — PILOCARPINE HYDROCHLORIDE 5 MG: 5 TABLET, FILM COATED ORAL at 17:47

## 2021-07-10 RX ADMIN — MELATONIN 3 MG: at 21:26

## 2021-07-10 RX ADMIN — DIVALPROEX SODIUM 750 MG: 500 TABLET, EXTENDED RELEASE ORAL at 21:26

## 2021-07-10 RX ADMIN — LEVOTHYROXINE SODIUM 50 MCG: 25 TABLET ORAL at 06:25

## 2021-07-10 RX ADMIN — PANTOPRAZOLE SODIUM 20 MG: 20 TABLET, DELAYED RELEASE ORAL at 16:58

## 2021-07-10 NOTE — PROGRESS NOTES
Progress Note - [address] [facility] [place] 53 y o  female MRN: [id number]   Unit/Bed#: U 247-01 Encounter: [id number]    Behavior over the last 24 hours:      Roel Calvillo was seen for an inpatient follow-up psychiatric visit this date  At today's visit, she denies any suicidal or homicidal ideation  She relates she did not wish to damage the wall in her room but became frustrated when respiratory did not give her her CPAP on time  She has otherwise had no behaviors on the unit  She is taking her medications as prescribed and denies side effects  Her appetite and sleep are good  She is looking forward to being discharged Monday and feels ready to leave  ROS: no complaints, all other systems are negative    Mental Status Evaluation:    Appearance:  casually dressed, dressed appropriately   Behavior:  pleasant, cooperative, calm   Speech:  normal rate and volume   Mood:  normal, euthymic   Affect:  normal range and intensity   Thought Process:  organized, logical, coherent   Associations: intact associations   Thought Content:  no overt delusions   Perceptual Disturbances: none   Risk Potential: Suicidal ideation - None  Homicidal ideation - None  Potential for aggression - No   Sensorium:  oriented to person, place and time/date   Memory:  recent and remote memory grossly intact   Consciousness:  alert and awake   Attention: attention span and concentration are age appropriate   Insight:  fair   Judgment: fair   Gait/Station: also uses walker   Motor Activity: no abnormal movements     Vital signs in last 24 hours:    Temp:  [97 4 °F (36 3 °C)-97 5 °F (36 4 °C)] 97 5 °F (36 4 °C)  HR:  [66-67] 66  Resp:  [16-18] 18  BP: (119-133)/(66-68) 119/66    Laboratory results:  I have personally reviewed all pertinent laboratory/tests results      Progress Toward Goals: progressing    Assessment/Plan   Principal Problem:    Major depressive disorder  Active Problems:    Yan's esophagus determined by biopsy Benign essential hypertension    Schizoaffective disorder, bipolar type ([facility])    Edema    Hypothyroidism    MAG (obstructive sleep apnea)    Overactive bladder    Primary osteoarthritis of both knees    COPD (chronic obstructive pulmonary disease) ([facility])    Class 3 severe obesity due to excess calories with serious comorbidity and body mass index (BMI) of 40 0 to 44 9 in adult [facility])    Memory difficulties    Medical clearance for psychiatric admission    Substance abuse ([facility] [address] )    Recommended Treatment:     Continue current medications as prescribed  Continue to monitor    Discharge disposition and planning are ongoing        All current active medications have been reviewed  Encourage group therapy, milieu therapy and occupational therapy  [facility] checks every 7 minutes    Current Facility-Administered Medications   Medication Dose Route Frequency Provider Last Rate    albuterol  2 puff Inhalation Q4H PRN ROMAN Mejia-LUIS      amLODIPine  5 mg Oral Daily Pamela Wagner, PA-C      baclofen  10 mg Oral TID PRN ROMAN Mejia-C      benztropine  1 mg Oral Q4H PRN Max 6/day Shana Srinivasan MD      buPROPion  450 mg Oral Daily Luis Armando Talbot MD      cholecalciferol  1,000 Units Oral Daily ROMAN Mejia-C      colestipol  1 g Oral BID ROMAN Mejia-C      Diclofenac Sodium  2 g Topical 4x Daily ROMAN Mejia-C      hydrOXYzine HCL  50 mg Oral Q6H PRN Max 4/day Shana Srinivasan MD      Or    diphenhydrAMINE  50 mg Intramuscular Q6H PRN Shana Srinivasan MD      divalproex sodium  750 mg Oral HS Luis Armando Talbot MD      fluticasone  2 puff Inhalation BID Ro Prince MD      furosemide  20 mg Oral Daily GEN MejiaC      hydrOXYzine HCL  100 mg Oral Q6H PRN Max 4/day Shana Srinivasan MD      Or    LORazepam  2 mg Intramuscular Q6H PRN Shana Srinivasan MD      hydrOXYzine HCL  25 mg Oral Q6H PRN Max 4/day Shana Srinivasan MD      ibuprofen  400 mg Oral Q4H PRN Deb Anderson A Denice Gibbs MD      ibuprofen  600 mg Oral Q6H PRN Herlinda John MD      ibuprofen  800 mg Oral Q8H PRN Herlinda John MD      levothyroxine  50 mcg Oral Early Morning Elaine Dolin, PA-C      losartan  50 mg Oral Daily Elaine Dolin, PA-C      lurasidone  60 mg Oral Daily With Breakfast Herlinda John MD      melatonin  3 mg Oral HS Herlinda John MD      memantine  10 mg Oral BID Elaine Dolin, PA-C      metoprolol tartrate  25 mg Oral BID Elaine Dolin, PA-C      OLANZapine  5 mg Oral Q4H PRN Max 3/day Herlinda John MD      Or   Hipolito Wise OLANZapine  2 5 mg Intramuscular Q4H PRN Max 3/day Herlinda John MD      OLANZapine  5 mg Oral Q3H PRN Max 3/day Herlinda John MD      Or    OLANZapine  5 mg Intramuscular Q3H PRN Max 3/day Herlinda John MD      OLANZapine  2 5 mg Oral Q4H PRN Max 6/day Herlinda John MD      oxybutynin  5 mg Oral Daily Elaine Dolin, PA-C      pantoprazole  20 mg Oral BID AC Elaine Dolin, PA-C      pilocarpine  5 mg Oral BID Elaine Dolin, PA-C      topiramate  100 mg Oral BID Elaine Dolin, PA-C      traZODone  150 mg Oral HS PRN Herlinda John MD         Risks / Benefits of Treatment:    Risks, benefits, and possible side effects of medications explained to patient and patient verbalizes understanding and agreement for treatment  Counseling / Coordination of Care:      Patient's progress discussed with staff in treatment team meeting  Medications, treatment progress and treatment plan reviewed with patient

## 2021-07-10 NOTE — NURSING NOTE
Patient withdrawn to her room for entirety of the evening  Upon initial assessment patient is bright, pleasant, and polite  She shares that she is looking forward to her discharge, she rated her depression and anxiety as "very low, maybe a 4"  Patient denied SI/HI and hallucinations  She declined snack, stating that she needed to lose weight for her surgery  During medication administration, she requested her inhaler and Voltaren be given later because she wasn't ready for it  Trazodone given with pedications per request at 2120  As the evening progressed patient became for and more frustrated due the respiratory therapist not arriving  Patient started kicking and crying and yelling stating "I don't give a f**k, they just don't care"  Patient was given emotional support and reassurance that respiratory would arrive as soon as possible  Patient eventually stopped using her words to communicate and began groaning in a childlike manner  She had even punched a hole in the wall in anger  Patient declined medication and continued to loudly cry despite being offered multiple measures  Patient refused her voltaren and inhaler  After respiratory did arrive patient initially was refusing to put her CPAP on, but after coaxing patient then stated she needed PRN medication before she'd be able to calmly put her CPAP on  Patient was administered 100 mg atarax at 2210  Patient has been controlled and calm since, resting with CPAP in place  No HI/SI expressed  Will remain on safety precautions and continual monitoring

## 2021-07-10 NOTE — PLAN OF CARE
Problem: Depression  Goal: Treatment Goal: Demonstrate behavioral control of depressive symptoms, verbalize feelings of improved mood/affect, and adopt new coping skills prior to discharge  Outcome: Progressing  Goal: Verbalize thoughts and feelings  Description: Interventions:  - Assess and re-assess patient's level of risk   - Engage patient in 1:1 interactions, daily, for a minimum of 15 minutes   - Encourage patient to express feelings, fears, frustrations, hopes   Outcome: Progressing  Goal: Refrain from harming self  Description: Interventions:  - Monitor patient closely, per order   - Supervise medication ingestion, monitor effects and side effects   Outcome: Progressing  Goal: Refrain from isolation  Description: Interventions:  - Develop a trusting relationship   - Encourage socialization   Outcome: Progressing  Goal: Refrain from self-neglect  Outcome: Progressing  Goal: Attend and participate in unit activities, including therapeutic, recreational, and educational groups  Description: Interventions:  - Provide therapeutic and educational activities daily, encourage attendance and participation, and document same in the medical record   Outcome: Progressing  Goal: Complete daily ADLs, including personal hygiene independently, as able  Description: Interventions:  - Observe, teach, and assist patient with ADLS  -  Monitor and promote a balance of rest/activity, with adequate nutrition and elimination   Outcome: Progressing     Problem: Anxiety  Goal: Anxiety is at manageable level  Description: Interventions:  - Assess and monitor patient's anxiety level  - Monitor for signs and symptoms (heart palpitations, chest pain, shortness of breath, headaches, nausea, feeling jumpy, restlessness, irritable, apprehensive)  - Collaborate with interdisciplinary team and initiate plan and interventions as ordered    - [place] patient to unit/surroundings  - Explain treatment plan  - Encourage participation in care  - Encourage verbalization of concerns/fears  - Identify coping mechanisms  - Assist in developing anxiety-reducing skills  - Administer/offer alternative therapies  - Limit or eliminate stimulants  Outcome: Progressing

## 2021-07-10 NOTE — NURSING NOTE
Trazodone effective  Patient observed sleeping comfortably for majority of q7 minute monitoring throughout the night, without demonstrating any signs or symptoms of distress  No acute behaviors overnight  CPAP at bedside  Will remain on safety precautions and continual q7 minute monitoring

## 2021-07-10 NOTE — PLAN OF CARE
Problem: Alteration in Thoughts and Perception  Goal: Verbalize thoughts and feelings  Description: Interventions:  - Promote a nonjudgmental and trusting relationship with the patient through active listening and therapeutic communication  - Assess patient's level of functioning, behavior and potential for risk  - Engage patient in 1 on 1 interactions  - Encourage patient to express fears, feelings, frustrations, and discuss symptoms    - Willard patient to reality, help patient recognize reality-based thinking   - Administer medications as ordered and assess for potential side effects  - Provide the patient education related to the signs and symptoms of the illness and desired effects of prescribed medications  Outcome: Progressing  Goal: Attend and participate in unit activities, including therapeutic, recreational, and educational groups  Description: Interventions:  -Encourage Visitation and family involvement in care  Outcome: Progressing     Problem: Risk for Self Injury/Neglect  Goal: Refrain from harming self  Description: Interventions:  - Monitor patient closely, per order  - Develop a trusting relationship  - Supervise medication ingestion, monitor effects and side effects   Outcome: Progressing  Goal: Complete daily ADLs, including personal hygiene independently, as able  Description: Interventions:  - Observe, teach, and assist patient with ADLS  - Monitor and promote a balance of rest/activity, with adequate nutrition and elimination  Outcome: Progressing     Problem: Depression  Goal: Refrain from harming self  Description: Interventions:  - Monitor patient closely, per order   - Supervise medication ingestion, monitor effects and side effects   Outcome: Progressing     Problem: Anxiety  Goal: Anxiety is at manageable level  Description: Interventions:  - Assess and monitor patient's anxiety level     - Monitor for signs and symptoms (heart palpitations, chest pain, shortness of breath, headaches, nausea, feeling jumpy, restlessness, irritable, apprehensive)  - Collaborate with interdisciplinary team and initiate plan and interventions as ordered    - Gardner patient to unit/surroundings  - Explain treatment plan  - Encourage participation in care  - Encourage verbalization of concerns/fears  - Identify coping mechanisms  - Assist in developing anxiety-reducing skills  - Administer/offer alternative therapies  - Limit or eliminate stimulants  Outcome: Progressing

## 2021-07-10 NOTE — NURSING NOTE
Patient awake on milieu  Flat and pleasant  Medication compliant  Appetite good  Denies  depression,hallucination, HI, SI  Patient became upset, hitting the wall and because another patient was yelling on the unit  Emotional support provided and atarax administered at 1431(+) effect  No  Injury noted  Continue onsafety monitoring

## 2021-07-11 PROCEDURE — 99232 SBSQ HOSP IP/OBS MODERATE 35: CPT | Performed by: NURSE PRACTITIONER

## 2021-07-11 PROCEDURE — 94660 CPAP INITIATION&MGMT: CPT

## 2021-07-11 PROCEDURE — 94760 N-INVAS EAR/PLS OXIMETRY 1: CPT

## 2021-07-11 RX ADMIN — PILOCARPINE HYDROCHLORIDE 5 MG: 5 TABLET, FILM COATED ORAL at 17:32

## 2021-07-11 RX ADMIN — PANTOPRAZOLE SODIUM 20 MG: 20 TABLET, DELAYED RELEASE ORAL at 17:32

## 2021-07-11 RX ADMIN — METOPROLOL TARTRATE 25 MG: 25 TABLET, FILM COATED ORAL at 08:53

## 2021-07-11 RX ADMIN — MEMANTINE 10 MG: 5 TABLET ORAL at 08:53

## 2021-07-11 RX ADMIN — IBUPROFEN 800 MG: 800 TABLET, FILM COATED ORAL at 09:46

## 2021-07-11 RX ADMIN — COLESTIPOL HYDROCHLORIDE 1 G: 1 TABLET ORAL at 17:34

## 2021-07-11 RX ADMIN — PANTOPRAZOLE SODIUM 20 MG: 20 TABLET, DELAYED RELEASE ORAL at 06:38

## 2021-07-11 RX ADMIN — LOSARTAN POTASSIUM 50 MG: 25 TABLET, FILM COATED ORAL at 08:52

## 2021-07-11 RX ADMIN — METOPROLOL TARTRATE 25 MG: 25 TABLET, FILM COATED ORAL at 17:33

## 2021-07-11 RX ADMIN — MELATONIN 3 MG: at 20:58

## 2021-07-11 RX ADMIN — DICLOFENAC SODIUM 2 G: 10 GEL TOPICAL at 17:35

## 2021-07-11 RX ADMIN — MEMANTINE 10 MG: 5 TABLET ORAL at 17:33

## 2021-07-11 RX ADMIN — DICLOFENAC SODIUM 2 G: 10 GEL TOPICAL at 11:43

## 2021-07-11 RX ADMIN — DICLOFENAC SODIUM 2 G: 10 GEL TOPICAL at 08:59

## 2021-07-11 RX ADMIN — FLUTICASONE PROPIONATE 2 PUFF: 110 AEROSOL, METERED RESPIRATORY (INHALATION) at 08:58

## 2021-07-11 RX ADMIN — IBUPROFEN 400 MG: 400 TABLET ORAL at 17:36

## 2021-07-11 RX ADMIN — DICLOFENAC SODIUM 2 G: 10 GEL TOPICAL at 20:59

## 2021-07-11 RX ADMIN — FUROSEMIDE 20 MG: 40 TABLET ORAL at 08:53

## 2021-07-11 RX ADMIN — TOPIRAMATE 100 MG: 25 TABLET, FILM COATED ORAL at 08:52

## 2021-07-11 RX ADMIN — DIVALPROEX SODIUM 750 MG: 500 TABLET, EXTENDED RELEASE ORAL at 20:58

## 2021-07-11 RX ADMIN — Medication 1000 UNITS: at 08:53

## 2021-07-11 RX ADMIN — LURASIDONE HYDROCHLORIDE 60 MG: 40 TABLET, FILM COATED ORAL at 08:50

## 2021-07-11 RX ADMIN — FLUTICASONE PROPIONATE 2 PUFF: 110 AEROSOL, METERED RESPIRATORY (INHALATION) at 21:00

## 2021-07-11 RX ADMIN — TOPIRAMATE 100 MG: 25 TABLET, FILM COATED ORAL at 17:32

## 2021-07-11 RX ADMIN — AMLODIPINE BESYLATE 5 MG: 5 TABLET ORAL at 08:52

## 2021-07-11 RX ADMIN — LEVOTHYROXINE SODIUM 50 MCG: 25 TABLET ORAL at 06:37

## 2021-07-11 RX ADMIN — OXYBUTYNIN CHLORIDE 5 MG: 5 TABLET, EXTENDED RELEASE ORAL at 08:52

## 2021-07-11 RX ADMIN — BUPROPION 450 MG: 150 TABLET, EXTENDED RELEASE ORAL at 08:50

## 2021-07-11 RX ADMIN — PILOCARPINE HYDROCHLORIDE 5 MG: 5 TABLET, FILM COATED ORAL at 08:52

## 2021-07-11 NOTE — PROGRESS NOTES
Patient is labile, crying and irritable, expressed feelings that no one ever cares about her or does anything for her  Affect labile, mood labile, poor insight  Pt feels her boss does not appreciate her  Patient is angry about  her CPAP machine and demanded to see respiratory  Spoke at length with pt and helped fill out breakfast menu  Contacted respiratory and they set up pt's CPAP  Pt apologized for outbursts and behaviors and is thankful for the help  Pt denies SI and HI currently  Alert and oriented, able to make needs known  No signs or symptoms of distress

## 2021-07-11 NOTE — PROGRESS NOTES
Progress Note - [address] [place] 53 y o  female MRN: [id number]   Unit/Bed#: U 247-01 Encounter: [id number]    Behavior over the last 24 hours:      Lani Hodges was seen for an inpatient follow-up psychiatric visit this date  At today's visit, she was tearful and had several complaints involving the rules of the unit, her peers, staff, etc   She states being in the hospital is making her anxious and she wishes to go home as soon as possible  Her insight is poor and she frequently states that is the behaviors of others that cause her feelings and behaviors  She is taking her medications as prescribed and denies any suicidal or homicidal ideation  She was able to sleep last night without difficulty  ROS: no complaints, all other systems are negative    Mental Status Evaluation:    Appearance:  casually dressed, dressed appropriately   Behavior:  demanding   Speech:  normal rate and volume   Mood:  irritable   Affect:  normal range and intensity   Thought Process:  organized, logical, coherent   Associations: intact associations   Thought Content:  no overt delusions   Perceptual Disturbances: none   Risk Potential: Suicidal ideation - None  Homicidal ideation - None  Potential for aggression - No   Sensorium:  oriented to person, place and time/date   Memory:  recent and remote memory grossly intact   Consciousness:  alert and awake   Attention: attention span and concentration are age appropriate   Insight:  poor   Judgment: poor   Gait/Station: uses walker   Motor Activity: no abnormal movements     Vital signs in last 24 hours:    Temp:  [96 7 °F (35 9 °C)-97 5 °F (36 4 °C)] 96 7 °F (35 9 °C)  HR:  [59-68] 59  Resp:  [18] 18  BP: (129-135)/(68-77) 129/77    Laboratory results:  I have personally reviewed all pertinent laboratory/tests results      Progress Toward Goals: progressing    Assessment/Plan   Principal Problem:    Major depressive disorder  Active Problems:    Yan's esophagus determined by biopsy    Benign essential hypertension    Schizoaffective disorder, bipolar type ([facility])    Edema    Hypothyroidism    MAG (obstructive sleep apnea)    Overactive bladder    Primary osteoarthritis of both knees    COPD (chronic obstructive pulmonary disease) ([facility])    Class 3 severe obesity due to excess calories with serious comorbidity and body mass index (BMI) of 40 0 to 44 9 in adult [facility])    Memory difficulties    Medical clearance for psychiatric admission    Substance abuse ([facility] [address] )    Recommended Treatment:     Continue current medications as prescribed  Continue to monitor    Discharge disposition and planning are ongoing        All current active medications have been reviewed  Encourage group therapy, milieu therapy and occupational therapy  [facility] checks every 7 minutes    Current Facility-Administered Medications   Medication Dose Route Frequency Provider Last Rate    albuterol  2 puff Inhalation Q4H PRN Ezequiel Burnette, PA-C      amLODIPine  5 mg Oral Daily Ezequiel Burnetet, PA-C      baclofen  10 mg Oral TID PRN Ezequiel Burnette, PA-C      benztropine  1 mg Oral Q4H PRN Max 6/day Birthjob Maria MD      buPROPion  450 mg Oral Daily Vicki Mujica MD      cholecalciferol  1,000 Units Oral Daily Ezequiel Burnette, PA-C      colestipol  1 g Oral BID Ezequiel Burnette, PA-C      Diclofenac Sodium  2 g Topical 4x Daily Ezequiel Burnette, PA-C      hydrOXYzine HCL  50 mg Oral Q6H PRN Max 4/day Merlin Maria MD      Or    diphenhydrAMINE  50 mg Intramuscular Q6H PRN Birtha MD Crow      divalproex sodium  750 mg Oral HS Vicki Mujica MD      fluticasone  2 puff Inhalation BID Barrett Pham MD      furosemide  20 mg Oral Daily Ezequiel Burnette, PA-C      hydrOXYzine HCL  100 mg Oral Q6H PRN Max 4/day Merlin Maria MD      Or    LORazepam  2 mg Intramuscular Q6H PRN Birthjob Maria MD      hydrOXYzine HCL  25 mg Oral Q6H PRN Max 4/day Floriana MD Crow      ibuprofen  400 mg Oral Q4H PRN Maryanne Nash MD      ibuprofen  600 mg Oral Q6H PRN Maryanne Nash MD      ibuprofen  800 mg Oral Q8H PRN Maryanne Nash MD      levothyroxine  50 mcg Oral Early Morning Skip Law, PA-C      losartan  50 mg Oral Daily Skip Law, PA-C      lurasidone  60 mg Oral Daily With Breakfast Maryanne Nash MD      melatonin  3 mg Oral HS Maryanne Nash MD      memantine  10 mg Oral BID Skip Law, PA-C      metoprolol tartrate  25 mg Oral BID Skip Law, PA-C      OLANZapine  5 mg Oral Q4H PRN Max 3/day Maryanne Nash MD      Or   Elmaribell Serge OLANZapine  2 5 mg Intramuscular Q4H PRN Max 3/day Maryanne Nash MD      OLANZapine  5 mg Oral Q3H PRN Max 3/day Maryanne Nash MD      Or    OLANZapine  5 mg Intramuscular Q3H PRN Max 3/day Maryanne Nash MD      OLANZapine  2 5 mg Oral Q4H PRN Max 6/day Maryanne Nash MD      oxybutynin  5 mg Oral Daily Skip Law, PA-C      pantoprazole  20 mg Oral BID AC Skip Law, PA-C      pilocarpine  5 mg Oral BID Skip Law, PA-C      topiramate  100 mg Oral BID Skip Law, PA-C      traZODone  150 mg Oral HS PRN Maryanne Nash MD         Risks / Benefits of Treatment:    Risks, benefits, and possible side effects of medications explained to patient and patient verbalizes understanding and agreement for treatment  Counseling / Coordination of Care:      Patient's progress discussed with staff in treatment team meeting  Medications, treatment progress and treatment plan reviewed with patient

## 2021-07-12 VITALS
WEIGHT: 274 LBS | DIASTOLIC BLOOD PRESSURE: 64 MMHG | HEART RATE: 64 BPM | OXYGEN SATURATION: 96 % | HEIGHT: 66 IN | RESPIRATION RATE: 18 BRPM | TEMPERATURE: 97.2 F | BODY MASS INDEX: 44.03 KG/M2 | SYSTOLIC BLOOD PRESSURE: 133 MMHG

## 2021-07-12 PROCEDURE — 94760 N-INVAS EAR/PLS OXIMETRY 1: CPT

## 2021-07-12 PROCEDURE — 99238 HOSP IP/OBS DSCHRG MGMT 30/<: CPT | Performed by: HOSPITALIST

## 2021-07-12 PROCEDURE — 94660 CPAP INITIATION&MGMT: CPT

## 2021-07-12 RX ORDER — LANOLIN ALCOHOL/MO/W.PET/CERES
3 CREAM (GRAM) TOPICAL
Qty: 30 TABLET | Refills: 0 | Status: SHIPPED | OUTPATIENT
Start: 2021-07-12 | End: 2021-08-11

## 2021-07-12 RX ORDER — BUPROPION HYDROCHLORIDE 450 MG/1
450 TABLET, FILM COATED, EXTENDED RELEASE ORAL DAILY
Qty: 30 TABLET | Refills: 0 | Status: SHIPPED | OUTPATIENT
Start: 2021-07-13 | End: 2021-08-28

## 2021-07-12 RX ORDER — DIVALPROEX SODIUM 250 MG/1
750 TABLET, EXTENDED RELEASE ORAL
Qty: 90 TABLET | Refills: 0 | Status: SHIPPED | OUTPATIENT
Start: 2021-07-12 | End: 2021-08-28

## 2021-07-12 RX ADMIN — BUPROPION 450 MG: 150 TABLET, EXTENDED RELEASE ORAL at 08:34

## 2021-07-12 RX ADMIN — PILOCARPINE HYDROCHLORIDE 5 MG: 5 TABLET, FILM COATED ORAL at 08:34

## 2021-07-12 RX ADMIN — PANTOPRAZOLE SODIUM 20 MG: 20 TABLET, DELAYED RELEASE ORAL at 05:41

## 2021-07-12 RX ADMIN — LEVOTHYROXINE SODIUM 50 MCG: 25 TABLET ORAL at 05:41

## 2021-07-12 RX ADMIN — DICLOFENAC SODIUM 2 G: 10 GEL TOPICAL at 08:36

## 2021-07-12 RX ADMIN — FLUTICASONE PROPIONATE 2 PUFF: 110 AEROSOL, METERED RESPIRATORY (INHALATION) at 08:36

## 2021-07-12 RX ADMIN — TOPIRAMATE 100 MG: 25 TABLET, FILM COATED ORAL at 08:34

## 2021-07-12 RX ADMIN — Medication 1000 UNITS: at 08:36

## 2021-07-12 RX ADMIN — METOPROLOL TARTRATE 25 MG: 25 TABLET, FILM COATED ORAL at 08:36

## 2021-07-12 RX ADMIN — COLESTIPOL HYDROCHLORIDE 1 G: 1 TABLET ORAL at 11:00

## 2021-07-12 RX ADMIN — MEMANTINE 10 MG: 5 TABLET ORAL at 08:34

## 2021-07-12 RX ADMIN — OXYBUTYNIN CHLORIDE 5 MG: 5 TABLET, EXTENDED RELEASE ORAL at 08:34

## 2021-07-12 RX ADMIN — FUROSEMIDE 20 MG: 40 TABLET ORAL at 08:35

## 2021-07-12 RX ADMIN — AMLODIPINE BESYLATE 5 MG: 5 TABLET ORAL at 08:35

## 2021-07-12 RX ADMIN — LOSARTAN POTASSIUM 50 MG: 25 TABLET, FILM COATED ORAL at 08:35

## 2021-07-12 RX ADMIN — LURASIDONE HYDROCHLORIDE 60 MG: 40 TABLET, FILM COATED ORAL at 08:32

## 2021-07-12 NOTE — PROGRESS NOTES
07/12/21 0800   Team Meeting   Meeting Type Daily Rounds   Initial Conference Date 07/12/21   Team Members Present   Team Members Present Physician;Nurse;   Physician Team Member Dr Jaylin Liang; [facility]   Nursing Team Member Gerson Goodrich, RN   Social Work Team Member Keri Melchor   Patient/Family Present   Patient Present No   Patient's Family Present No     Presents with some mood lability and somatic at times  Reported feeling better yesterday evening  Denies SI/HI  Scheduled for discharge today at 12:30 with [facility] to provide transport home

## 2021-07-12 NOTE — PROGRESS NOTES
07/12/21 0773   Activity/Group Checklist   Group   (Goal Planning and Communication)   Attendance Attended   Attendance Duration (min) 46-60   Interactions Interacted appropriately   Affect/Mood Appropriate   Goals Achieved Identified feelings; Able to listen to others; Able to engage in interactions

## 2021-07-12 NOTE — DISCHARGE SUMMARY
Discharge Summary - [facility] Kody Parra [age] y o  female MRN: [id number]  Unit/Bed#: -01 Encounter: [id number]     Admission Date: 7/6/2021         Discharge Date: 7/12/2021 12:58 PM    Attending Psychiatrist:  Radha Newton MD    Reason for Admission/HPI:     Principal Problem:    Major depressive disorder  Active Problems:    Yan's esophagus determined by biopsy    Benign essential hypertension    Schizoaffective disorder, bipolar type (HCC)    Edema    Hypothyroidism    MAG (obstructive sleep apnea)    Overactive bladder    Primary osteoarthritis of both knees    COPD (chronic obstructive pulmonary disease) ([facility] )    Class 3 severe obesity due to excess calories with serious comorbidity and body mass index (BMI) of 40 0 to 44 9 in adult [facility])    Memory difficulties    Medical clearance for psychiatric admission    Substance abuse ([facility] )    Mariah Murcia is a 67-year-old female patient admitted to the [facility] unit on a voluntary 201 commitment basis due to depression and anxiety with suicidal ideation  Per crisis evaluation completed by Crisis Worker Oskar Rey:    Patient presents to the ED via EMS after calling a suicide hotline  Reports she called her [facility] ICM and therapist for support but never received a response  On exam she is tearful, sobbing and overtly anxious and restless  She intermittently squeezes a stress ball during the interview  Patient reports increased anxiety, depression, hopelessness, worthlessness, poor sleep (slept last night int he ED with C-pap), decrease in appetite x 7 days  Admits to worsening symptoms x3 days  Indicates the following stressors/triggers: The 6/24 anniversary of a friend's death on, her job, acute knee pain that is affecting her quality of life and ability to walk, and feeling as though "everyone hates her"  Also reports she recently lost her therapist, left the practice   Reports to self-harming via superficially cutting both wrists yesterday with a razor  Presently, she reports suicidal ideations with a plan to cut her wrist  Hx of suicide attempts - "I attempted to hang myself and they had to cut the rope with a  "  Last attempt being 3 years ago - was subsequently hospitalized at [facility]  Denies HI/AH/VH  No delusional thinking  Denies D&A use outside of medical marijuann vaping  UDS + THC and Meth  Reports she is a recovering alcoholic, presently sober  Patient is preoccupied with her knee pain and is requesting an x-ray  Reports she uses a cane for support  Notes she is scheduled to have a knee replacement in September Lives by herself  Working full time, but called off multiple days last week  Notes her father is able to care for her pet cat       Patient has a hx of inpatient psychiatric hospitalizations  Last being at [facility]  Geetha Osborn is following for psychiatry care and counseling  Dr Igor Serrato manages her medications, appointments are bimonthly  Last appointment was on 6/28/202  Reports her therapist left the practice and she has no one supporting her currently  [facility] is her ICM through [facility]  Patient gave verbal consent to notify       Patient agreed to inpatient [facility] admission and signed 201 after read and given her rights  LORENZO Barbour agreed with the same and signed 201  Per history and physical completed by Dr Yasmany Drake:    On evaluation in the inpatient psychiatric unit [facility] she has been struggling for the last several weeks due to exacerbated symptoms of her bipolar disorder  Patient endorses feelings of sadness with intermittent feelings of anger and irritability  She reports crying spells and feeling constantly anxious  She endorses symptoms of lack of energy and motivation, feelings of worthlessness, feeling hopeless at times, having anhedonia with little enjoyment in her life as well as having thoughts of self-harm    Patient states while she is in the psychiatric unit she can remain safe   On evaluation in the ED patient had suicidal ideations with plan to cut her wrists  She does have history of last suicide attempts with cutting as well as overdose  She denies any psychotic symptoms  She does endorse having periods of irritability and anger  She states that people around her are against her and especially the other tenants in the mental health housing she lives in  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 worker  Patient's stressors include- 6/24 was anniversary of best friend's death, harassed by tenants in the building, poor support from family  Lives in [facility] Course: The patient was admitted to the inpatient psychiatric unit and started on every 7 minutes precautions  During the hospitalization the patient was attending individual therapy, group therapy, milieu therapy and occupational therapy  Psychiatric medications were titrated over the hospital stay  To address depressive symptoms, mood swings, irritability and agitation the patient was started on antidepressant Wellbutrin XL, mood stabilizer Depakote ER and Topamax and antipsychotic medication Latuda  Medication doses were titrated during the hospital course  Prior to beginning of treatment medications risks and benefits and possible side effects including risk of liver impairment related to treatment with Depakote, risk of cardiovascular events in elderly related to treatment with antipsychotic medications and risk of suicidality and serotonin syndrome related to treatment with antidepressants were reviewed with the patient  The patient verbalized understanding and agreement for treatment  Patient's symptoms improved gradually over the hospital course  At the end of treatment the patient was doing well  Mood was stable at the time of discharge  The patient denied suicidal ideation, intent or plan at the time of discharge and denied homicidal ideation, intent or plan at the time of discharge   There was no overt psychosis at the time of discharge  Sleep and appetite were improved  The patient was tolerating medications and was not reporting any significant side effects at the time of discharge  Since Beryle Dolores was doing well at the end of the hospitalization, treatment team felt that she could be safely discharged to outpatient care  The outpatient follow up was arranged by the unit  upon discharge and is as follows:    Call [address]  Please call your primary care physician provider to schedule an appointment, as needed  [address], [address]   Tel:  [phone]   Fax:  [phone]           Call [facility] Alcohol Intake [facility]  If you change your mind about seeking substance abuse counseling, please consider call this agency's staff to obtain appropriate services  [address], [address] [facility]   Phone:  [phone]   [id number] Follow up with Claire,Peer Specialist at [facility]  Monday Jul 12, 2021  Please promptly be ready for Claire's home visit to you that will be scheduled by Charly Walker, within five business days of your hospital discharge   [address]   [address]   Tel:  [phone]   Fax:  [phone]   Jul14 Follow up with Bethanie Eric at [facility]  Wednesday Jul 14, 2021  Please promptly respond to [facility] virtual contact with you, scheduled for 2:30 pm  [address]   [address]   Tel:  [phone]   Fax:  [phone]   [id number] OT-Treatment with Usman Cardona OT  Thursday Jul 15, 2021 6:00 PM  Physical Therapy at [address] [place] [address], [address]           Go to Cristopher Little at [facility]  Thursday Jul 15, 2021  Please arrive early for your office visit with your new therapist, scheduled for 10:30 am  [address], [address]   Tel: [phone]   Fax:  [phone]   [id number] OFFICE VISIT with JHONATAN Pink  Monday Jul 19, 2021 4:00 PM (Arrive by 3:45 PM)  [facility], [address] , [place], [place], [id number] [phone]    [id number] OT-Treatment with Pam Hernandez OT  Thursday Jul 22, 2021 5:00 PM  Physical Therapy at [address] [place] [address], [address]      Mental Status at time of Discharge:     Appearance:  casually dressed   Behavior:  normal   Speech:  normal pitch and normal volume   Mood:  normal   Affect:  normal   Thought Process:  normal   Thought Content:  normal   Perceptual Disturbances: None   Risk Potential: Patient denies any suicidal or homicidal ideations  Sensorium:  person, place, time/date and situation   Cognition:  recent and remote memory grossly intact   Consciousness:  alert and awake    Attention: attention span and concentration were age appropriate   Insight:  fair   Judgment: fair   Gait/Station: normal gait/station except uses walker   Motor Activity: no abnormal movements     Admission Diagnosis:Depression [F32 9]  Psychotic disorder ([facility] ) [F29]    Discharge Diagnosis:   Principal Problem:    Major depressive disorder  Active Problems:    Yan's esophagus determined by biopsy    Benign essential hypertension    Schizoaffective disorder, bipolar type (HCC)    Edema    Hypothyroidism    MAG (obstructive sleep apnea)    Overactive bladder    Primary osteoarthritis of both knees    COPD (chronic obstructive pulmonary disease) ([facility] )    Class 3 severe obesity due to excess calories with serious comorbidity and body mass index (BMI) of 40 0 to 44 9 in adult [facility])    Memory difficulties    Medical clearance for psychiatric admission    Substance abuse ([facility] )  Resolved Problems:    * No resolved hospital problems   *        Lab results:  Admission on 07/06/2021, Discharged on 07/12/2021   Component Date Value    Sodium 07/08/2021 140     Potassium 07/08/2021 3 9     Chloride 07/08/2021 106     CO2 07/08/2021 25     ANION GAP 07/08/2021 9     BUN 07/08/2021 8     Creatinine 07/08/2021 0 58*    Glucose 07/08/2021 105*    Calcium 07/08/2021 8 8     eGFR 07/08/2021 109     Magnesium 07/08/2021 2 0     Valproic Acid, Total 07/10/2021 35 6*       Discharge Medications:  Discharge Medication List as of 7/12/2021 12:33 PM      START taking these medications    Details   divalproex sodium (DEPAKOTE ER) 250 mg 24 hr tablet Take 3 tablets (750 mg total) by mouth daily at bedtime, Starting Mon 7/12/2021, Until Wed 8/11/2021, Normal      melatonin 3 mg Take 1 tablet (3 mg total) by mouth daily at bedtime, Starting Mon 7/12/2021, Until Wed 8/11/2021, Normal            Discharge Medication List as of 7/12/2021 12:33 PM      STOP taking these medications       divalproex sodium (DEPAKOTE) 500 mg EC tablet Comments:   Reason for Stopping:              Discharge Medication List as of 7/12/2021 12:33 PM      CONTINUE these medications which have CHANGED    Details   buPROPion (FORFIVO XL) 450 MG 24 hr tablet Take 1 tablet (450 mg total) by mouth daily, Starting Tue 7/13/2021, Until Thu 8/12/2021, Normal            Discharge Medication List as of 7/12/2021 12:33 PM      CONTINUE these medications which have NOT CHANGED    Details   albuterol (ProAir HFA) 90 mcg/act inhaler Inhale 2 puffs every 4 (four) hours as needed for wheezing, Starting Mon 1/4/2021, Normal      amLODIPine (NORVASC) 5 mg tablet Take 1 tablet (5 mg total) by mouth daily, Starting Wed 6/9/2021, Normal      baclofen 10 mg tablet Take 1 tablet (10 mg total) by mouth 3 (three) times a day as needed for muscle spasms, Starting Wed 6/23/2021, Normal      carboxymethylcellulose (REFRESH PLUS) 0 5 % SOLN Lubricating Plus 0 5 % eye drops in a dropperette, Historical Med      cholecalciferol (VITAMIN D3) 1,000 units tablet Take 1,000 Units by mouth daily, Historical Med colestipol (COLESTID) 1 g tablet Take 1 tablet (1 g total) by mouth 2 (two) times a day, Starting Thu 5/30/2019, Print      Diclofenac Sodium (VOLTAREN) 1 % Apply 2 g topically 4 (four) times a day, Starting Wed 6/23/2021, Normal      fluticasone (FLOVENT HFA) 110 MCG/ACT inhaler Inhale 2 puffs 2 (two) times a day Rinse mouth after use , Starting Wed 6/9/2021, Normal      furosemide (LASIX) 20 mg tablet Take 1 tablet (20 mg total) by mouth daily, Starting Wed 6/9/2021, Normal      hydrOXYzine HCL (ATARAX) 25 mg tablet Starting Mon 6/28/2021, Historical Med      levothyroxine 50 mcg tablet Take 1 tablet (50 mcg total) by mouth daily in the early morning, Starting Wed 6/9/2021, Normal      loratadine (CLARITIN) 10 mg tablet Take 1 tablet (10 mg total) by mouth daily, Starting Mon 5/17/2021, Normal      losartan (COZAAR) 50 mg tablet Take 50 mg by mouth daily , Starting Sat 6/16/2018, Historical Med      Lurasidone HCl (Latuda) 60 MG TABS Take 60 mg by mouth daily with dinner , Historical Med      memantine (NAMENDA) 10 mg tablet 1 twice a day, Normal      metoprolol tartrate (LOPRESSOR) 25 mg tablet TAKE ONE TABLET BY MOUTH TWICE A DAY, Normal      naproxen (NAPROSYN) 500 mg tablet Take 1 tablet (500 mg total) by mouth 2 (two) times a day with meals For 5 days then as needed, Starting Tue 6/29/2021, Normal      pilocarpine (SALAGEN) 5 mg tablet TAKE 1 TABLET (5 MG TOTAL) BY MOUTH TWO (TWO) TIMES A DAY, Normal      RABEprazole (ACIPHEX) 20 MG tablet Take 20 mg by mouth every 12 (twelve) hours, Historical Med      topiramate (TOPAMAX) 100 mg tablet Take 100 mg by mouth 2 (two) times a day, Historical Med      TOVIAZ 4 MG TB24 Take 1 tablet (4 mg total) by mouth daily, Starting Wed 6/12/2019, No Print      traZODone (DESYREL) 150 mg tablet Take 150 mg by mouth daily at bedtime as needed , Starting Mon 9/14/2020, Historical Med              Discharge instructions/Information to patient and family:   See after visit summary for information provided to patient and family  Provisions for Follow-Up Care:  See after visit summary for information related to follow-up care and any pertinent home health orders  Discharge Statement   I spent 30 minutes discharging the patient  This time was spent on the day of discharge  I had direct contact with the patient on the day of discharge  Additional documentation is required if more than 30 minutes were spent on discharge  I reviewed with Chuyita Mujica importance of compliance with medications and outpatient treatment after discharge

## 2021-07-12 NOTE — PROGRESS NOTES
Patient is being discharged with the following belongings     Pj pants x3  Orange shirt   Purple shirt   Blue shirt  Pairs of socks x3  Underwear x 6  Black slides   Ankle brace   Cheetah cane   Pink back pack with misc stuff and clothing   Shoes with laces   Cell phone     meds from pharmacy       Patient was present and signed belonging sheet

## 2021-07-12 NOTE — PLAN OF CARE
Problem: Anxiety  Goal: Anxiety is at manageable level  Description: Interventions:  - Assess and monitor patient's anxiety level  - Monitor for signs and symptoms (heart palpitations, chest pain, shortness of breath, headaches, nausea, feeling jumpy, restlessness, irritable, apprehensive)  - Collaborate with interdisciplinary team and initiate plan and interventions as ordered    - [place] patient to unit/surroundings  - Explain treatment plan  - Encourage participation in care  - Encourage verbalization of concerns/fears  - Identify coping mechanisms  - Assist in developing anxiety-reducing skills  - Administer/offer alternative therapies  - Limit or eliminate stimulants  Outcome: Adequate for Discharge     Problem: Alteration in Thoughts and Perception  Goal: Treatment Goal: Gain control of psychotic behaviors/thinking, reduce/eliminate presenting symptoms and demonstrate improved reality functioning upon discharge  Outcome: Completed  Goal: Verbalize thoughts and feelings  Description: Interventions:  - Promote a nonjudgmental and trusting relationship with the patient through active listening and therapeutic communication  - Assess patient's level of functioning, behavior and potential for risk  - Engage patient in 1 on 1 interactions  - Encourage patient to express fears, feelings, frustrations, and discuss symptoms    - [place] patient to reality, help patient recognize reality-based thinking   - Administer medications as ordered and assess for potential side effects  - Provide the patient education related to the signs and symptoms of the illness and desired effects of prescribed medications  Outcome: Completed  Goal: Refrain from acting on delusional thinking/internal stimuli  Description: Interventions:  - Monitor patient closely, per order   - Utilize least restrictive measures   - Set reasonable limits, give positive feedback for acceptable   - Administer medications as ordered and monitor of potential side effects  Outcome: Completed  Goal: Agree to be compliant with medication regime, as prescribed and report medication side effects  Description: Interventions:  - Offer appropriate PRN medication and supervise ingestion; conduct AIMS, as needed   Outcome: Completed  Goal: Attend and participate in unit activities, including therapeutic, recreational, and educational groups  Description: Interventions:  -Encourage Visitation and family involvement in care  Outcome: Completed  Goal: Recognize dysfunctional thoughts, communicate reality-based thoughts at the time of discharge  Description: Interventions:  - Provide medication and psycho-education to assist patient in compliance and developing insight into his/her illness   Outcome: Completed  Goal: Complete daily ADLs, including personal hygiene independently, as able  Description: Interventions:  - Observe, teach, and assist patient with ADLS  - Monitor and promote a balance of rest/activity, with adequate nutrition and elimination   Outcome: Completed     Problem: Ineffective Coping  Goal: Cooperates with admission process  Description: Interventions:   - Complete admission process  Outcome: Completed  Goal: Identifies ineffective coping skills  Outcome: Completed  Goal: Identifies healthy coping skills  Outcome: Completed  Goal: Demonstrates healthy coping skills  Outcome: Completed  Goal: Participates in unit activities  Description: Interventions:  - Provide therapeutic environment   - Provide required programming   - Redirect inappropriate behaviors   Outcome: Completed  Goal: Patient/Family participate in treatment and DC plans  Description: Interventions:  - Provide therapeutic environment  Outcome: Completed  Goal: Patient/Family verbalizes awareness of resources  Outcome: Completed  Goal: Understands least restrictive measures  Description: Interventions:  - Utilize least restrictive behavior  Outcome: Completed  Goal: Free from restraint events  Description: - Utilize least restrictive measures   - Provide behavioral interventions   - Redirect inappropriate behaviors   Outcome: Completed     Problem: Risk for Self Injury/Neglect  Goal: Treatment Goal: Remain safe during length of stay, learn and adopt new coping skills, and be free of self-injurious ideation, impulses and acts at the time of discharge  Outcome: Completed  Goal: Verbalize thoughts and feelings  Description: Interventions:  - Assess and re-assess patient's lethality and potential for self-injury  - Engage patient in 1:1 interactions, daily, for a minimum of 15 minutes  - Encourage patient to express feelings, fears, frustrations, hopes  - Establish rapport/trust with patient   Outcome: Completed  Goal: Refrain from harming self  Description: Interventions:  - Monitor patient closely, per order  - Develop a trusting relationship  - Supervise medication ingestion, monitor effects and side effects   Outcome: Completed  Goal: Attend and participate in unit activities, including therapeutic, recreational, and educational groups  Description: Interventions:  - Provide therapeutic and educational activities daily, encourage attendance and participation, and document same in the medical record  - Obtain collateral information, encourage visitation and family involvement in care   Outcome: Completed  Goal: Recognize maladaptive responses and adopt new coping mechanisms  Outcome: Completed  Goal: Complete daily ADLs, including personal hygiene independently, as able  Description: Interventions:  - Observe, teach, and assist patient with ADLS  - Monitor and promote a balance of rest/activity, with adequate nutrition and elimination  Outcome: Completed     Problem: Depression  Goal: Treatment Goal: Demonstrate behavioral control of depressive symptoms, verbalize feelings of improved mood/affect, and adopt new coping skills prior to discharge  Outcome: Completed  Goal: Verbalize thoughts and feelings  Description: Interventions:  - Assess and re-assess patient's level of risk   - Engage patient in 1:1 interactions, daily, for a minimum of 15 minutes   - Encourage patient to express feelings, fears, frustrations, hopes   Outcome: Completed  Goal: Refrain from harming self  Description: Interventions:  - Monitor patient closely, per order   - Supervise medication ingestion, monitor effects and side effects   Outcome: Completed  Goal: Refrain from isolation  Description: Interventions:  - Develop a trusting relationship   - Encourage socialization   Outcome: Completed  Goal: Refrain from self-neglect  Outcome: Completed  Goal: Attend and participate in unit activities, including therapeutic, recreational, and educational groups  Description: Interventions:  - Provide therapeutic and educational activities daily, encourage attendance and participation, and document same in the medical record   Outcome: Completed  Goal: Complete daily ADLs, including personal hygiene independently, as able  Description: Interventions:  - Observe, teach, and assist patient with ADLS  -  Monitor and promote a balance of rest/activity, with adequate nutrition and elimination   Outcome: Completed     Problem: Ineffective Coping  Goal: Participates in unit activities  Description: Interventions:  - Provide therapeutic environment   - Provide required programming   - Redirect inappropriate behaviors   Outcome: Completed     Problem: SUBSTANCE USE/ABUSE  Goal: By discharge, will develop insight into their chemical dependency and sustain motivation to continue in recovery  Description: INTERVENTIONS:  - Attends all daily group sessions and scheduled AA groups  - Actively practices coping skills through participation in the therapeutic community and adherence to program rules  - Reviews and completes assignments from individual treatment plan  - Assist patient development of understanding of their personal cycle of addiction and relapse triggers  Outcome: Completed  Goal: By discharge, patient will have ongoing treatment plan addressing chemical dependency  Description: INTERVENTIONS:  - Assist patient with resources and/or appointments for ongoing recovery based living  Outcome: Completed     Problem: DISCHARGE PLANNING - CARE MANAGEMENT  Goal: Discharge to post-acute care or home with appropriate resources  Description: INTERVENTIONS:  - Conduct assessment to determine patient/family and health care team treatment goals, and need for post-acute services based on payer coverage, community resources, and patient preferences, and barriers to discharge  - Address psychosocial, clinical, and financial barriers to discharge as identified in assessment in conjunction with the patient/family and health care team  - Arrange appropriate level of post-acute services according to patients   needs and preference and payer coverage in collaboration with the physician and health care team  - Communicate with and update the patient/family, physician, and health care team regarding progress on the discharge plan  - Arrange appropriate transportation to post-acute venues  Outcome: Completed

## 2021-07-12 NOTE — BH TRANSITION RECORD
Contact Information: If you have any questions, concerns, pended studies, tests and/or procedures, or emergencies regarding your inpatient behavioral health visit  Please contact [facility], [facility] behavioral health unit [phone] and ask to speak to a , nurse or physician  A contact is available 24 hours/ 7 days a week at this number  Summary of Procedures Performed During your Stay:  Below is a list of major procedures performed during your hospital stay and a summary of results:  - No major procedures performed  Pending Studies (From admission, onward)    None        If studies are pending at discharge, follow up with your PCP and/or referring provider

## 2021-07-12 NOTE — SOCIAL WORK
SW met with pt who denied SI / HI, having expressed readiness for discharge, having noted that her father will meet her at apt, following arrival with Erinn Taylor at about 1:15 pm   Pt expressed disappointment in [facility] staff as she perceives that they do not visit her as much as other clients  SW noted that [facility], for which [facility] has referred her, would provide her more intensive / frequent contact that would benefit pt  Pt stated intention to keep appointment scheduled in her behalf with Dr Ceci Brock, therapist at [facility], [facility] and Peer Specialist at [facility]  She continues to decline a referral for substance abuse counseling  Pt declined the scheduling of an appointment with her PCP to whom she wants her discharge summary faxed  Pt expressed appreciation for [facility] services

## 2021-07-12 NOTE — NURSING NOTE
Patient spent the early part of the evening in the DR  Interacting appropriately with peers  Patient is pleasant and bright this evening  She reports she is feeling "a little better" overall  Denies s/i  States her depression is "not too bad" but she is having some anxiety bc she wants to see her mother who had a stroke over the weekend    She said she talked to her on the phone today which made her feel better  She also reports some anxiety related to the stimulation on the unit    She reports being annoyed by some of the other patients but states she has been able to "hold my tongue "  She does acknowledge she has "anger issues" and needs to continue to work on this

## 2021-07-12 NOTE — NURSING NOTE
Patient had been observed sitting in dining room  Talking with peers at arrival of shift  Pt had good eye contact and was making jokes with this nurse  Shortly thereafter, she was observed crying in her bed  Pt declined eye contact with this nurse  At that time, She also initially declined to answer my questions  She did eventually begin to speak  She remained tearful and was talking about how she has no support outside the hospital and feels like her team does not care about her  Tells this nurse "nothing has changed since I came in here but I just want to go home today anyway" Compliant with all scheduled medications  No prns requested  Provider and CM made aware of patient's continued lability  Pt says she was too upset to eat breakfast  Laser observed sitting alone in dining room during group  Will maintain on safety precautions and continual monitoring  No needs identified

## 2021-07-12 NOTE — PROGRESS NOTES
07/12/21 1000   Activity/Group Checklist   Group   ([employer] and Art Therapy Processing)   Attendance Attended   Attendance Duration (min) Greater than 60   Interactions Interacted appropriately   Affect/Mood Blunted/flat;Calm   Goals Achieved Identified feelings; Discussed coping strategies; Able to listen to others; Able to engage in interactions

## 2021-07-12 NOTE — NURSING NOTE
Patient left unit walking with avs and belongings to  [place] with this nurse  No questions or concerns

## 2021-07-15 ENCOUNTER — OFFICE VISIT (OUTPATIENT)
Dept: OCCUPATIONAL THERAPY | Facility: CLINIC | Age: 51
End: 2021-07-15
Payer: MEDICARE

## 2021-07-15 DIAGNOSIS — Z91.89 POTENTIAL FOR COGNITIVE IMPAIRMENT: Primary | ICD-10-CM

## 2021-07-15 PROCEDURE — 97530 THERAPEUTIC ACTIVITIES: CPT

## 2021-07-15 NOTE — PROGRESS NOTES
Occupational Therapy Daily Note:    Today's date: 7/15/2021  Patient name: Alis Garcia  : 1970  MRN: [id number]  Referring provider: Rossy Chisholm MD  Dx:   Encounter Diagnosis   Name Primary?  Potential for cognitive impairment Yes                  Subjective: "I went to the hospital, they gave me a new medication and it's working but I think it's making my shakes worse "    Objective: Pt seen for OT treatment session focusing on direction following, alternating attention, focus/concentration, act tolerance/endurance to inc indep in ADL/IADL and return to work  Pt completed multimatrix with underlined letter grid copy component  Pt req 1 repetition of task instruction, pt able to indep complete task with 90% accuracy and demo loss of place 2 x with ability to self-correct mistakes  Pt completed immediate recall task, pt studied brain box card for 1 min & able to recall 75% of card details  Pt then completed pixy cube task for cog organization, & pattern recognition, pt demo G comprehension of task  Assessment: Tolerated treatment well  Pt demo G progress in tasks with G sustained attention  Patient would benefit from continued skilled OT  Plan: Continued skilled OT per POC        INTERVENTION COMMENTS:  Diagnosis: Potential for cognitive impairment [Z91 89]  Precautions: hy of self-harm/suicidal ideation  FOTO:   Insurance: Payor: Bennett Deleon / Plan: MEDICARE A AND B / Product Type: Medicare A & B Fee for Service /   2 of [address] visits, PN due 21

## 2021-07-19 ENCOUNTER — OFFICE VISIT (OUTPATIENT)
Dept: FAMILY MEDICINE CLINIC | Facility: CLINIC | Age: 51
End: 2021-07-19
Payer: MEDICARE

## 2021-07-19 VITALS
DIASTOLIC BLOOD PRESSURE: 84 MMHG | RESPIRATION RATE: 16 BRPM | WEIGHT: 279.4 LBS | HEART RATE: 83 BPM | TEMPERATURE: 98.1 F | OXYGEN SATURATION: 95 % | SYSTOLIC BLOOD PRESSURE: 130 MMHG | BODY MASS INDEX: 44.9 KG/M2 | HEIGHT: 66 IN

## 2021-07-19 DIAGNOSIS — R22.0 SWELLING OF HEAD: Primary | ICD-10-CM

## 2021-07-19 PROCEDURE — 99213 OFFICE O/P EST LOW 20 MIN: CPT | Performed by: NURSE PRACTITIONER

## 2021-07-19 RX ORDER — ONDANSETRON 4 MG/1
TABLET, ORALLY DISINTEGRATING ORAL
COMMUNITY
Start: 2021-06-30 | End: 2021-08-28

## 2021-07-19 NOTE — PROGRESS NOTES
Subjective:   Chief Complaint   Patient presents with    Mass     on forhead        Patient ID: Ivette Mcmahon is a 53 y o  female  Presents today for complaints of lump on left forehead since July 5th  She was hospitalized during this time and unfortunately was discharged prior to the referral to have it evaluated  It has been resolving with out anything over-the-counter or warm compresses  She did notice significant improvement on Thursday July 15th  The following portions of the patient's history were reviewed and updated as appropriate: allergies, current medications, past family history, past medical history, past social history, past surgical history and problem list     Review of Systems   Constitutional: Negative for chills, fatigue and fever  Respiratory: Negative for cough and shortness of breath  Cardiovascular: Negative for palpitations  Skin: Positive for wound (left forehead)  Neurological: Negative for headaches  Psychiatric/Behavioral: Positive for dysphoric mood (being treated)  The patient is nervous/anxious (being treated)  Objective:  Vitals:    07/19/21 1552   BP: 130/84   BP Location: Left arm   Patient Position: Sitting   Cuff Size: Large   Pulse: 83   Resp: 16   Temp: 98 1 °F (36 7 °C)   TempSrc: Tympanic   SpO2: 95%   Weight: 127 kg (279 lb 6 4 oz)   Height: 5' 6" (1 676 m)      Physical Exam  Vitals reviewed  Constitutional:       Appearance: Normal appearance  She is obese  Neck:      Vascular: No carotid bruit  Cardiovascular:      Rate and Rhythm: Normal rate and regular rhythm  Pulses: Normal pulses  Heart sounds: Normal heart sounds  Musculoskeletal:      Cervical back: Normal range of motion and neck supple  Lymphadenopathy:      Cervical: No cervical adenopathy  Skin:     General: Skin is warm and dry  Neurological:      Mental Status: She is alert             Assessment/Plan:    No problem-specific Assessment & Plan notes found for this encounter  Diagnoses and all orders for this visit:    Swelling of head  Comments:  Left forehead resolving   Advised to apply warm compresses as needed    Other orders  -     ondansetron (ZOFRAN-ODT) 4 mg disintegrating tablet

## 2021-07-22 ENCOUNTER — OFFICE VISIT (OUTPATIENT)
Dept: OCCUPATIONAL THERAPY | Facility: CLINIC | Age: 51
End: 2021-07-22
Payer: MEDICARE

## 2021-07-22 DIAGNOSIS — Z91.89 POTENTIAL FOR COGNITIVE IMPAIRMENT: Primary | ICD-10-CM

## 2021-07-22 PROCEDURE — 97530 THERAPEUTIC ACTIVITIES: CPT

## 2021-07-22 NOTE — PROGRESS NOTES
Weight Management Medical Nutrition Assessment  Beryle Dolores is here for medical meal planning (bypass MD)  Current wt: 271 2 lbs  She is on wellbutrin + topamax  Goal is to lose weight for a knee replacement  Reports it is hard for her to lose weight due to being very picky and also has IBS, dysphagia  Often eats only 1x/day in the evening  Excess calories via simple sugars (lemonade, ice cream)  Importance of consistency and interval eating discussed  Due to her pickiness/fear of GI issues would recommend use of a protein shake during the day  She was receptive of this  Meal plan provided along with other resources  She will f/u with medical provider       Patient seen by Medical Provider in past 6 months:  no  Requested to schedule appointment with Medical Provider: Yes    Anthropometric Measurements  Start Weight (#):  271 2 lbs    Ideal Body Weight (#): 125 lbs   Goal Weight (#): 262 lbs for a knee replacement  Highest: 350 lbs     Weight Loss History  Previous weight loss attempts: Exercise  High Protein/Low CHO diets (Atkins, Union, etc )  Self Created Diets (Portion Control, Healthy Food Choices, etc )    Food and Nutrition Related History  Wake up: 4-5:00   Bed Time: 10:00    Food Recall  Breakfast:4:00 skip OR 2 deli meat rolls (ham, lebanon, chicken), orange juice or lemonade    Snack: skip  Lunch: skip OR 1:00: hamburger soup (starchy veggies, 80/20)  Snack: skip  Dinner: 5-6:00: rotisserie chicken breast, small amount of gravy  Snack: fruit, ice cream (a lot)    Beverages: water and sweetened beverages ([place] Lemonade),   Volume of beverage intake:  A lot of lemonade     Weekends: Improved, will eat 2 meals/day  Cravings: gummi savers   Trouble area of day: during day    Frequency of Eating out: 2-3x/wk  Food restrictions: n/a  Cooking: self   Food Shopping: self    Physical Activity Intake  Activity:none  Frequency:rarely  Physical limitations/barriers to exercise:  Knee pain     Estimated Needs  Energy  [employer] Energy Needs: BMR : 6747  1-2# loss weekly sedentary:  6512-9745           1-2# loss weekly lightly active: 2542-8015  Maintenance calories for sedentary activity level: 2227  Protein: 68-85 gm      (1 2-1 5g/kg IBW)  Fluid: 66 oz    (35mL/kg IBW)    Nutrition Diagnosis  Yes;     Overweight/obesity  related to Excess energy intake as evidenced by  BMI more than normative standard for age and sex (obesity-grade III 36+)       Nutrition Intervention    Nutrition Prescription  Calories:4736-6672  Protein: 85 gm    Meal Plan (Abhilash/Pro)  Breakfast: 250, 15  Snack: 100-150, 5-10  Lunch: 250, 20  Snack: skip  Dinner: 350-550, 42  Snack: 200-250, 0-5  Lemonade 115    Nutrition Education:    Calorie controlled menu  Lean protein food choices  Healthy snack options  Food journaling tips    Nutrition Counseling:  Strategies: meal planning, portion sizes, healthy snack choices, hydration, fiber intake, protein intake, exercise, food journal    Monitoring and Evaluation:  Evaluation criteria:  Energy Intake  Meet protein needs  Maintain adequate hydration  Monitor weekly weight  Meal planning/preparation  Food journal   Decreased portions at mealtimes and snacks  Physical activity     Barriers to learning:none  Readiness to change: Preparation:  (Getting ready to change)   Comprehension: good  Expected Compliance: good

## 2021-07-22 NOTE — PROGRESS NOTES
Occupational Therapy Daily Note:    Today's date: 2021  Patient name: Sandra Roy  : 1970  MRN: [id number]  Referring provider: Letitia Bolden MD  Dx:   Encounter Diagnosis   Name Primary?  Potential for cognitive impairment Yes                  Subjective: "I have a hard time remembering the alphabet "    Objective: Pt seen for OT treatment session focusing on working memory/STM, immediate recall, alternating attention, mental arithmetic to inc indep in ADL/IADL and work participation  Pt completed alternating attention & immediate memory task  Pt instructed to alternate between numbers/letters in forward alphabetical order & identify place associated with each letter  Pt req inc time to sequence alphabet, demo loss of place 1 x with indep self-corrections  Pt engaged in mental arithmetic task to calculate small sums based on word problems  Pt completed half of worksheet with 100% success & provided with remainder for homework  Assessment: Tolerated treatment well  Pt reported being back to work and is able to complete tasks with no issues  Pt demo G progress in immediate recall  Patient would benefit from continued skilled OT  Plan: Continued skilled OT per POC      INTERVENTION COMMENTS:  Diagnosis: Potential for cognitive impairment [Z91 89]  Precautions: hy of self-harm/suicidal ideation  FOTO:   Insurance: Payor: MEDICARE / Plan: MEDICARE A AND B / Product Type: Medicare A & B Fee for Service /   3 of [facility] visits, PN due 21

## 2021-07-26 ENCOUNTER — OFFICE VISIT (OUTPATIENT)
Dept: BARIATRICS | Facility: CLINIC | Age: 51
End: 2021-07-26

## 2021-07-26 VITALS — WEIGHT: 271.2 LBS | BODY MASS INDEX: 45.18 KG/M2 | HEIGHT: 65 IN

## 2021-07-26 DIAGNOSIS — E66.01 MORBID OBESITY (HCC): ICD-10-CM

## 2021-07-26 DIAGNOSIS — R63.5 ABNORMAL WEIGHT GAIN: ICD-10-CM

## 2021-07-26 PROCEDURE — RECHECK

## 2021-07-26 PROCEDURE — WMDI30

## 2021-07-28 ENCOUNTER — OFFICE VISIT (OUTPATIENT)
Dept: FAMILY MEDICINE CLINIC | Facility: CLINIC | Age: 51
End: 2021-07-28
Payer: MEDICARE

## 2021-07-28 ENCOUNTER — TELEPHONE (OUTPATIENT)
Dept: FAMILY MEDICINE CLINIC | Facility: CLINIC | Age: 51
End: 2021-07-28

## 2021-07-28 VITALS
WEIGHT: 274.2 LBS | DIASTOLIC BLOOD PRESSURE: 90 MMHG | SYSTOLIC BLOOD PRESSURE: 140 MMHG | HEART RATE: 81 BPM | OXYGEN SATURATION: 96 % | BODY MASS INDEX: 45.63 KG/M2 | RESPIRATION RATE: 18 BRPM | TEMPERATURE: 97.1 F

## 2021-07-28 DIAGNOSIS — H57.89 EYE INFLAMMATION: Primary | ICD-10-CM

## 2021-07-28 DIAGNOSIS — F25.0 SCHIZOAFFECTIVE DISORDER, BIPOLAR TYPE (HCC): Chronic | ICD-10-CM

## 2021-07-28 PROCEDURE — 99213 OFFICE O/P EST LOW 20 MIN: CPT | Performed by: NURSE PRACTITIONER

## 2021-07-28 RX ORDER — PREDNISONE 20 MG/1
40 TABLET ORAL DAILY
Qty: 10 TABLET | Refills: 0 | Status: SHIPPED | OUTPATIENT
Start: 2021-07-28 | End: 2021-08-25 | Stop reason: ALTCHOICE

## 2021-07-28 NOTE — PROGRESS NOTES
Assessment/Plan:         Diagnoses and all orders for this visit:    Eye inflammation  -     predniSONE 20 mg tablet; Take 2 tablets (40 mg total) by mouth daily  vasoline when burning stops   Also see eye dr when appt in August     Schizoaffective disorder, bipolar type [facility])      on a new team for management to help her     Issues with them hooking up and guiding her  Issues with getting into a new psychiatrist    Was to have one and doesn't yet   Meds running low advised that if not hooked up to not run out and let me know          Subjective:      Patient ID: Astrid Hogan is a 53 y o  female  HPI  Did see eye DR and it got better 6 months ago    Now started with rash around eyes again   No eye drainage   Constant irritation   Burning and itching of skin    Using vasoline but causes itching    Usig the drops that eye dR gave Trying a steroid but topical irritation  The following portions of the patient's history were reviewed and updated as appropriate: allergies, current medications, past family history, past medical history, past social history, past surgical history and problem list     Review of Systems   Constitutional: Negative for activity change, appetite change and fatigue  Eyes: Positive for redness and itching  Negative for photophobia, pain, discharge and visual disturbance  Objective:  Vitals:    07/28/21 1121   BP: 140/90   BP Location: Left arm   Patient Position: Sitting   Cuff Size: Large   Pulse: 81   Resp: 18   Temp: (!) 97 1 °F (36 2 °C)   TempSrc: Temporal   SpO2: 96%   Weight: 124 kg (274 lb 3 2 oz)      Physical Exam  Vitals reviewed  Constitutional:       Appearance: Normal appearance  Eyes:     Neurological:      Mental Status: She is alert  swelling of eye lids    Erythema    No eye discharge

## 2021-07-29 ENCOUNTER — OFFICE VISIT (OUTPATIENT)
Dept: OCCUPATIONAL THERAPY | Facility: CLINIC | Age: 51
End: 2021-07-29
Payer: MEDICARE

## 2021-07-29 DIAGNOSIS — Z91.89 POTENTIAL FOR COGNITIVE IMPAIRMENT: Primary | ICD-10-CM

## 2021-07-29 DIAGNOSIS — I10 HYPERTENSION, UNSPECIFIED TYPE: ICD-10-CM

## 2021-07-29 PROCEDURE — 97530 THERAPEUTIC ACTIVITIES: CPT

## 2021-07-29 NOTE — PROGRESS NOTES
Occupational Therapy Daily Note:    Today's date: 2021  Patient name: Suellen Valenzuela  : 1970  MRN: [id number]  Referring provider: Krissy Paris MD  Dx:   Encounter Diagnosis   Name Primary?  Potential for cognitive impairment Yes                  Subjective: "I don't even remember you saying that "    Objective: Pt seen for OT treatment session focusing on working memory/STM, immediate recall, mental arithmetic, sustained attention to inc indep in ADL/IADL  Pt & OT disc pt's homework on mental arithmetic  With instruction on task breakdown, pt able to complete with 100% accuracy  Pt engaged in ispy dig-in task focusing on immediate recall  Pt provided with 3 objects/colors to locate within cluttered field  Pt able to recall 2/3 objects/colors, act downgraded to identify objects w/o color component  Pt able to recall 3/3 objects indep, act upgraded to 4 objects & pt demo ability to recall 4/4  Pt completed IQ fit puzzle on beginner level for visuospatial skills, pt completed task quickly and indep  Pt completed mental manipulation & immediate recall task  Pt provided with 3 words, instructed to sequence in alphabetical order then locate in cluttered field  With educ on repeat/rehearse memory strategy, pt able to recall 3/3 words  Pt provided with fractions secret code worksheet to complete for homework  Assessment: Tolerated treatment well  Pt demo G progress with use of repeat/rehearse memory strategy  Patient would benefit from continued skilled OT  Plan: Continued skilled OT per POC      INTERVENTION COMMENTS:  Diagnosis: Potential for cognitive impairment [Z91 89]  Precautions: hy of self-harm/suicidal ideation  FOTO:   Insurance: Payor: MEDICARE / Plan: MEDICARE A AND B / Product Type: Medicare A & B Fee for Service /   4 of [facility] visits, PN due 21

## 2021-08-04 ENCOUNTER — EVALUATION (OUTPATIENT)
Dept: OCCUPATIONAL THERAPY | Facility: CLINIC | Age: 51
End: 2021-08-04
Payer: MEDICARE

## 2021-08-04 DIAGNOSIS — Z91.89 POTENTIAL FOR COGNITIVE IMPAIRMENT: Primary | ICD-10-CM

## 2021-08-04 PROCEDURE — 97530 THERAPEUTIC ACTIVITIES: CPT

## 2021-08-04 PROCEDURE — 96125 COGNITIVE TEST BY HC PRO: CPT

## 2021-08-04 NOTE — PROGRESS NOTES
OCCUPATIONAL THERAPY INITIAL EVALUATION:    2021  Kushal Talbert  1970  [id number]  Sri Anguiano MD  1  Potential for cognitive impairment        Subjective    "I can't remember things like I used to "    PATIENT GOAL: "To start doing things again"    RBANS Administration, scoring, interpretation, POC development >91 minutes  350-4 TX: Pt completed deductive reasoning puzzle & provided with additional puzzle for homework  Assessment/Plan    Skilled Analysis:  Pt is a 53 y o  female referred to Occupational Therapy s/p Potential for cognitive impairment [Z91 89]  Since participation in [employer] , pt reports improved stress mgmt and less frustration at home and at work  Pt reports she has a new mental health ACT team which is being implemented this week, and is contributing to stress reduc  Pt demo improvements in stress mgmt, immediate & delayed memory, language skills  Due to transition to new ACT team, pt and OT disc pt continuing with OT services for stress reduc during transition  Pt participated in skilled OT re evaluation and following formalized testing, continues to present with the following areas of deficit: multitasking/dual tasking, attention and divided attention/alternating attention impacting indep and completion of ADL/IADL, salient, and leisure tasks  Pt does demo the need for continued skilled Occupational Therapy services 1x/week for 4 additional weeks with focus on fxnl cognition, short term memory, long term memory and healthy coping education to address the goals as listed below   Pt in agreement with POC, POC to  w/in 90 days     Goals:    Short Term Goals: (4 weeks)    COGNITION         -      Memory    o Pt will demo G recall of 95% of verbal/written information utilizing memory strategy of choice for improved STM/delayed memory PRGORESSING    o Pt will demo G carryover of use of internal/external memory strategy aides for improved recall of daily events, improved executive functioning with 95  % accuracy PROGRESSING    o Pt will retain/comprehend 95% of verbal and/or written information as provided to inc overall life role performance and for improved performance for note taking PROGRESSING        - Attention    o Pt will maintain attention to task for 45minutes in multimodal environment for baseline performance,  improved role performance and to improve learning and to simulate return to life and work environment [facility]    o Pt will demo ability to participate in dual tasking/divided attention task with 75% accuracy in multimodal environment to simulate return to life and work roles PROGRESSING    o Pt will demo ability to alternate attention between 2 tasks with cog loading and 90% accuracy with G retention of task directions in multimodal environment to simulate return to life and work  roles PROGRESSING      Treatment Interventions  Direction following (verbal & written)  ADL safety tasks (sequencing cards, safety cards)  Fxnl Sequencing Tasks (written sequencing, picture sequencing)  Sustained attention tasks (use of timers as needed)  Alternating/Divided Attention tasks (Organize the Hour, BITS, Auditory processing)  Memory Strategies Education & Functional Application  Short Term Recall (memory mix up, Memory Game, Brain Box)  Delayed recall (word recall, logical memory, BITS)  Mental Manipulation (3 word alphabetical organization, sentence unscramble, sequencing, mental math)  Topographical Orientation (maps task, mall map, etc)  Cognitive organization ([address], furniture delivery, kitchen shelves etc)  Clock Construction/Time awareness ([address], time identification, clock drawing)  Calendar tasks  HEP development    Pain Levels:     Restin    With Activity:  0    Objective Not reassessed today due to testing normal at IE, not goals created for UE function/strengthening    NOEMI MARTINEZ Comments                 UPPER EXTREMITY FXN Impaired Impaired Dominant Hand: R                         /Pinch Strength         Dynamometer         - Gross Grasp 64 lbs 70 lbs normal    Pinch Meter          - PINCER 12 lbs 15 lbs  normal    - TRIPOD 16 lbs 14 lbs  normal    - LATERAL 17 lbs  15 lbs  normal           9 Hole Peg Test 16 seconds 25 seconds normal       Cognitive Function      Cognitive Checklist: Patient indicated that she is experiencing the following symptoms:    · Memory: Remembering what people have told you and Learning new things    · Attention: Keeping your attention/concentrating on a task    · Processing: Processing new information     · Executive Functions: None reported    · Communication: None reported    · Visual: None reported    · Emotional: Increased anxiety, Increased depression and Easily agitated or irritable    · Increased Sensitivities to: None reported     The Repeatable Battery for the Assessment of Neuropsychological Status (RBANS) is a brief, individually-administered assessment which measures attention, language, visuospatial/constructional abilities, and immediate & delayed memory  The RBANS is intended for use with adolescents to adults, ages 15 to 80 years  The following results were obtained during the administration of the assessment  Form: C    Cognitive Domain/Subtest: Index Score: Percentile Rank: Classification:  Changes since IE   IMMEDIATE MEMORY 94 34%ile Borderline Average        1  List Learning (32/40)         2  Story Memory (13/24)         VISUOSPATIAL/  CONSTRUCTIONAL 112 79%ile High Average remains        3  Figure Copy (20/20)         4  Line Orientation (18/20)         LANGUAGE 91 27%ile Average IMPROVED        5  Picture Naming (10/10)         6  Semantic Fluency (45/73)         ATTENTION 72 3%ile Borderline remains        7  Digit Span (6/16)         8  Coding (46/89)         DELAYED MEMORY 114 82%ile High Average IMPROVED        9  List Recall (8/10)         10  List Recognition (20/20)         11   Story Recall (8/12)         12   Figure Recall (20/20)          Sum of Index Scores:  483   Total Score:  94   Percentile: 34%ile   Classification: Average     INTERVENTION COMMENTS:  Diagnosis: Potential for cognitive impairment [Z91 89]  Precautions: hy of self-harm/suicidal ideation  FOTO:   Insurance: Payor: MEDICARE / Plan: MEDICARE A AND B / Product Type: Medicare A & B Fee for Service /   5 of [facility] visits, PN due  9/4/21

## 2021-08-05 ENCOUNTER — TELEPHONE (OUTPATIENT)
Dept: OTHER | Facility: OTHER | Age: 51
End: 2021-08-05

## 2021-08-11 ENCOUNTER — APPOINTMENT (OUTPATIENT)
Dept: OCCUPATIONAL THERAPY | Facility: CLINIC | Age: 51
End: 2021-08-11
Payer: MEDICARE

## 2021-08-18 ENCOUNTER — OFFICE VISIT (OUTPATIENT)
Dept: OCCUPATIONAL THERAPY | Facility: CLINIC | Age: 51
End: 2021-08-18
Payer: MEDICARE

## 2021-08-18 DIAGNOSIS — Z91.89 POTENTIAL FOR COGNITIVE IMPAIRMENT: Primary | ICD-10-CM

## 2021-08-18 PROCEDURE — 97530 THERAPEUTIC ACTIVITIES: CPT

## 2021-08-18 PROCEDURE — 97535 SELF CARE MNGMENT TRAINING: CPT

## 2021-08-18 NOTE — PROGRESS NOTES
Occupational Therapy Daily Note:    Today's date: 2021  Patient name: Nitin Lucero  : 1970  MRN: [id number]  Referring provider: Charisma Fontenot MD  Dx:   Encounter Diagnosis   Name Primary?  Potential for cognitive impairment Yes                  Subjective: "I am having a hard day "    Objective: Pt seen for OT treatment session focusing on  healthy coping strategies & self-care/mgmt & mental manipulation to reduce stress in patient's daily life activities  Pt reported her mom passed away at 12:17 in the morning  Pt & OT disc pt's coping strategies as pt reported she is not sleeping or eating due to care of mom while in hospice care  Pt encouraged to maintain healthy sleep and nutrition habits and additionally talk to therapist as needed  Pt completed mental manipulation task, pt provided with 3 words and instructed to place in reverse alphabetical order  Pt c/o HA post act  Pt left 15 mins early from session as needed for self-care  Assessment: Tolerated treatment fair  Pt demo understandable difficulty with grief over passing over her mother  Pt demo G ability to disc difficulties and to obtain resources as needed to care for self  Patient would benefit from continued skilled OT  Plan: Continued skilled OT per POC      INTERVENTION COMMENTS:  Diagnosis: Potential for cognitive impairment [Z91 89]  Precautions: hy of self-harm/suicidal ideation  FOTO:   Insurance: Payor: MEDICARE / Plan: MEDICARE A AND B / Product Type: Medicare A & B Fee for Service /   6 of [facility] visits, PN due 21

## 2021-08-19 ENCOUNTER — TELEPHONE (OUTPATIENT)
Dept: OTHER | Facility: OTHER | Age: 51
End: 2021-08-19

## 2021-08-19 NOTE — TELEPHONE ENCOUNTER
Pt called to cancel their apt for today  Pt's mom passed away last night  Please call pt back at a later time to reschedule

## 2021-08-25 ENCOUNTER — OFFICE VISIT (OUTPATIENT)
Dept: OCCUPATIONAL THERAPY | Facility: CLINIC | Age: 51
End: 2021-08-25
Payer: MEDICARE

## 2021-08-25 ENCOUNTER — NURSE TRIAGE (OUTPATIENT)
Dept: OTHER | Facility: OTHER | Age: 51
End: 2021-08-25

## 2021-08-25 ENCOUNTER — OFFICE VISIT (OUTPATIENT)
Dept: FAMILY MEDICINE CLINIC | Facility: CLINIC | Age: 51
End: 2021-08-25
Payer: MEDICARE

## 2021-08-25 VITALS
SYSTOLIC BLOOD PRESSURE: 120 MMHG | DIASTOLIC BLOOD PRESSURE: 76 MMHG | WEIGHT: 272.6 LBS | TEMPERATURE: 97.5 F | OXYGEN SATURATION: 98 % | BODY MASS INDEX: 45.36 KG/M2 | HEART RATE: 77 BPM | RESPIRATION RATE: 18 BRPM

## 2021-08-25 DIAGNOSIS — Z91.89 POTENTIAL FOR COGNITIVE IMPAIRMENT: Primary | ICD-10-CM

## 2021-08-25 DIAGNOSIS — M25.561 CHRONIC PAIN OF RIGHT KNEE: ICD-10-CM

## 2021-08-25 DIAGNOSIS — Z63.4 BEREAVEMENT: ICD-10-CM

## 2021-08-25 DIAGNOSIS — R60.9 EDEMA, UNSPECIFIED TYPE: Primary | ICD-10-CM

## 2021-08-25 DIAGNOSIS — E03.9 HYPOTHYROIDISM, UNSPECIFIED TYPE: ICD-10-CM

## 2021-08-25 DIAGNOSIS — R58 ECCHYMOSIS: ICD-10-CM

## 2021-08-25 DIAGNOSIS — G89.29 CHRONIC PAIN OF RIGHT KNEE: ICD-10-CM

## 2021-08-25 PROCEDURE — 97530 THERAPEUTIC ACTIVITIES: CPT

## 2021-08-25 PROCEDURE — 99213 OFFICE O/P EST LOW 20 MIN: CPT | Performed by: NURSE PRACTITIONER

## 2021-08-25 RX ORDER — LANOLIN ALCOHOL/MO/W.PET/CERES
CREAM (GRAM) TOPICAL
COMMUNITY
Start: 2021-08-20 | End: 2021-08-28

## 2021-08-25 RX ORDER — BUPROPION HYDROCHLORIDE 150 MG/1
TABLET ORAL
COMMUNITY
Start: 2021-07-30 | End: 2021-08-28

## 2021-08-25 RX ORDER — NEOMYCIN SULFATE, POLYMYXIN B SULFATE, AND DEXAMETHASONE 3.5; 10000; 1 MG/G; [USP'U]/G; MG/G
OINTMENT OPHTHALMIC
COMMUNITY
Start: 2021-07-27 | End: 2021-08-28

## 2021-08-25 RX ORDER — BUPROPION HYDROCHLORIDE 300 MG/1
TABLET ORAL
COMMUNITY
Start: 2021-07-24 | End: 2021-08-28

## 2021-08-25 SDOH — SOCIAL STABILITY - SOCIAL INSECURITY: DISSAPEARANCE AND DEATH OF FAMILY MEMBER: Z63.4

## 2021-08-25 NOTE — TELEPHONE ENCOUNTER
Left message for pt to contact office tomorrow am to discuss refill  Review of chart does not indicate active Rx for Estrace

## 2021-08-25 NOTE — TELEPHONE ENCOUNTER
Regarding: Out of medication  ----- Message from Nabil Murillo sent at 8/25/2021  4:50 PM EDT -----  " I am out of medication Estrace 1 mg and I need a refill "

## 2021-08-25 NOTE — PROGRESS NOTES
Occupational Therapy Daily Note:    Today's date: 2021  Patient name: Sima Becerril  : 1970  MRN: [id number]  Referring provider: Harlan Verduzco MD  Dx:   Encounter Diagnosis   Name Primary?  Potential for cognitive impairment Yes                  Subjective: "I had no problems with this worksheet "    Objective: Pt seen for OT treatment session focusing on working memory, direction following, alternating attention to inc indep in ADL/IADL  Pt completed alternating attention & cog organization task  Pt identified common phrases based on images followed by finding common phrases with missing letters  Pt req 2 VC on two occasions to identify phrases based on pictures  Pt completed brain box card task for immediate recall, following 1 min time delay, pt recalled 90% of card details  Pt then completed bunny book worksheet for general comprehension & cog organization, listening comprehension as well  Pt provided with instructions verbally, then identified T/F statements based on images  Pt demo indep and 100% accuracy in task  Pt provided with HEP focusing on identifying common phrases  Assessment: Tolerated treatment well  Pt demo G progress in immediate recall and improved sustained/alternating attention  Patient would benefit from continued skilled OT  Plan: Continued skilled OT per POC      INTERVENTION COMMENTS:  Diagnosis: Potential for cognitive impairment [Z91 89]  Precautions: hy of self-harm/suicidal ideation  FOTO:   Insurance: Payor: MEDICARE / Plan: MEDICARE A AND B / Product Type: Medicare A & B Fee for Service /   7 of [facility] visits, PN due 21

## 2021-08-25 NOTE — PROGRESS NOTES
Assessment/Plan:         Diagnoses and all orders for this visit:    Edema, unspecified type  Elevate legs  Increase dose of lasix to 2 tabs daily (40 mg) x 3 days   Ecchymosis  Recent labs ok  Bereavement  Loss of mother recently   Chronic pain of right knee  Seeing ortho   Needs knee replacement   Other orders  -     buPROPion (WELLBUTRIN XL) 150 mg 24 hr tablet  -     buPROPion (WELLBUTRIN XL) 300 mg 24 hr tablet  -     melatonin 3 mg  -     neomycin-polymyxin-dexamethasone (MAXITROL) 0 35%-10,000 units/g-0 1%          Subjective:      Patient ID: Eddie Recinos is a 53 y o  female  HPI  Recent loss of her Mom  Was in hospice  She was with her all the time  Having bilateral leg pain  Joints are very ache and swollen    Needs replacement of right knee  Bilateral leg swelling and achiness  Also echymosis   Seeing chiropractor for back pain    Knows when she did that  The following portions of the patient's history were reviewed and updated as appropriate: allergies, current medications, past family history, past medical history, past social history, past surgical history and problem list     Review of Systems   Constitutional: Positive for activity change  Negative for appetite change, fatigue and fever  HENT: Negative for congestion, postnasal drip and sore throat  Respiratory: Negative for cough, chest tightness and shortness of breath  Cardiovascular: Positive for leg swelling  Negative for chest pain and palpitations  Genitourinary: Negative for urgency  Musculoskeletal: Positive for arthralgias  Negative for neck pain and neck stiffness  Neurological: Negative for dizziness, light-headedness, numbness and headaches  Hematological: Bruises/bleeds easily  Psychiatric/Behavioral: The patient is nervous/anxious            Objective:  Vitals:    08/25/21 0822   BP: 120/76   BP Location: Left arm   Patient Position: Sitting   Cuff Size: Large   Pulse: 77   Resp: 18 Temp: 97 5 °F (36 4 °C)   TempSrc: Temporal   SpO2: 98%   Weight: 124 kg (272 lb 9 6 oz)      Physical Exam  Vitals reviewed  Constitutional:       Appearance: Normal appearance  She is obese  Eyes:      Conjunctiva/sclera: Conjunctivae normal    Cardiovascular:      Rate and Rhythm: Normal rate and regular rhythm  Pulses: Normal pulses  Heart sounds: Normal heart sounds  Pulmonary:      Effort: Pulmonary effort is normal       Breath sounds: Normal breath sounds  Abdominal:      General: Bowel sounds are normal    Musculoskeletal:      Cervical back: Normal range of motion and neck supple  Skin:     General: Skin is warm  Findings: Bruising present  Neurological:      Mental Status: She is alert and oriented to person, place, and time

## 2021-08-26 ENCOUNTER — TELEPHONE (OUTPATIENT)
Dept: NEUROLOGY | Facility: CLINIC | Age: 51
End: 2021-08-26

## 2021-08-26 DIAGNOSIS — R23.2 HOT FLASHES: Primary | ICD-10-CM

## 2021-08-26 RX ORDER — ESTRADIOL 1 MG/1
1 TABLET ORAL DAILY
COMMUNITY
End: 2021-08-28

## 2021-08-26 NOTE — TELEPHONE ENCOUNTER
Sent staff message to Katerine:        Saundra Michael,     Patient had a death and the family and canceled her last botox  She woul like to reschedule, and is aware the other appointments; would need to be changed as well   Please call her : [phone], Thank you!        -Amos Dalton

## 2021-08-26 NOTE — TELEPHONE ENCOUNTER
Patient is scheduled for visit with Dr Patrice Luo on 9/9  Called patient if she could switch to in person  lvm with my direct line

## 2021-08-27 RX ORDER — ESTRADIOL 1 MG/1
1 TABLET ORAL DAILY
Qty: 30 TABLET | Refills: 0 | OUTPATIENT
Start: 2021-08-27 | End: 2021-09-26

## 2021-08-27 NOTE — TELEPHONE ENCOUNTER
Patient called @ 8:30 on Friday, august 27 stating she is anxious about getting her botox from 8/19 rescheduled (as well as her other appointments) She returns to work on Tuesday, 8/31 and would like this handled before she returns to work

## 2021-08-28 ENCOUNTER — HOSPITAL ENCOUNTER (EMERGENCY)
Facility: HOSPITAL | Age: 51
End: 2021-08-30
Attending: EMERGENCY MEDICINE | Admitting: EMERGENCY MEDICINE
Payer: MEDICARE

## 2021-08-28 DIAGNOSIS — F32.A DEPRESSION: Primary | ICD-10-CM

## 2021-08-28 DIAGNOSIS — R45.851 SUICIDAL IDEATIONS: ICD-10-CM

## 2021-08-28 PROCEDURE — U0003 INFECTIOUS AGENT DETECTION BY NUCLEIC ACID (DNA OR RNA); SEVERE ACUTE RESPIRATORY SYNDROME CORONAVIRUS 2 (SARS-COV-2) (CORONAVIRUS DISEASE [COVID-19]), AMPLIFIED PROBE TECHNIQUE, MAKING USE OF HIGH THROUGHPUT TECHNOLOGIES AS DESCRIBED BY CMS-2020-01-R: HCPCS | Performed by: EMERGENCY MEDICINE

## 2021-08-28 PROCEDURE — 99285 EMERGENCY DEPT VISIT HI MDM: CPT | Performed by: EMERGENCY MEDICINE

## 2021-08-28 PROCEDURE — 82075 ASSAY OF BREATH ETHANOL: CPT | Performed by: EMERGENCY MEDICINE

## 2021-08-28 PROCEDURE — 80307 DRUG TEST PRSMV CHEM ANLYZR: CPT | Performed by: EMERGENCY MEDICINE

## 2021-08-28 PROCEDURE — 81025 URINE PREGNANCY TEST: CPT | Performed by: EMERGENCY MEDICINE

## 2021-08-28 PROCEDURE — U0005 INFEC AGEN DETEC AMPLI PROBE: HCPCS | Performed by: EMERGENCY MEDICINE

## 2021-08-28 PROCEDURE — 99285 EMERGENCY DEPT VISIT HI MDM: CPT

## 2021-08-28 RX ORDER — BACLOFEN 10 MG/1
10 TABLET ORAL 3 TIMES DAILY
COMMUNITY
End: 2021-12-09 | Stop reason: ALTCHOICE

## 2021-08-28 RX ORDER — FLUTICASONE PROPIONATE 50 MCG
1 SPRAY, SUSPENSION (ML) NASAL DAILY
COMMUNITY
End: 2021-12-23 | Stop reason: HOSPADM

## 2021-08-28 RX ORDER — LOSARTAN POTASSIUM 50 MG/1
50 TABLET ORAL DAILY
Status: ON HOLD | COMMUNITY
End: 2022-01-10 | Stop reason: SDUPTHER

## 2021-08-28 RX ORDER — LORATADINE 10 MG/1
10 TABLET ORAL DAILY
COMMUNITY
End: 2021-12-23 | Stop reason: HOSPADM

## 2021-08-28 RX ORDER — FESOTERODINE FUMARATE 4 MG/1
4 TABLET, FILM COATED, EXTENDED RELEASE ORAL DAILY
COMMUNITY
End: 2021-10-13 | Stop reason: ALTCHOICE

## 2021-08-28 RX ORDER — PILOCARPINE HYDROCHLORIDE 5 MG/1
5 TABLET, FILM COATED ORAL 2 TIMES DAILY
Status: DISCONTINUED | OUTPATIENT
Start: 2021-08-29 | End: 2021-08-31 | Stop reason: HOSPADM

## 2021-08-28 RX ORDER — FUROSEMIDE 40 MG/1
20 TABLET ORAL DAILY
Status: DISCONTINUED | OUTPATIENT
Start: 2021-08-29 | End: 2021-08-31 | Stop reason: HOSPADM

## 2021-08-28 RX ORDER — DIVALPROEX SODIUM 250 MG/1
750 TABLET, DELAYED RELEASE ORAL DAILY
Status: DISCONTINUED | OUTPATIENT
Start: 2021-08-29 | End: 2021-08-31 | Stop reason: HOSPADM

## 2021-08-28 RX ORDER — METOPROLOL SUCCINATE 25 MG/1
25 TABLET, EXTENDED RELEASE ORAL 2 TIMES DAILY
COMMUNITY
End: 2021-12-21 | Stop reason: SDUPTHER

## 2021-08-28 RX ORDER — TRAZODONE HYDROCHLORIDE 100 MG/1
200 TABLET ORAL
Status: ON HOLD | COMMUNITY
End: 2022-01-10 | Stop reason: SDUPTHER

## 2021-08-28 RX ORDER — TOPIRAMATE 100 MG/1
100 TABLET, FILM COATED ORAL 2 TIMES DAILY
Status: ON HOLD | COMMUNITY
End: 2022-01-10 | Stop reason: SDUPTHER

## 2021-08-28 RX ORDER — RABEPRAZOLE SODIUM 20 MG/1
20 TABLET, DELAYED RELEASE ORAL 2 TIMES DAILY
COMMUNITY
End: 2021-12-23 | Stop reason: HOSPADM

## 2021-08-28 RX ORDER — ALBUTEROL SULFATE 90 UG/1
2 AEROSOL, METERED RESPIRATORY (INHALATION) EVERY 6 HOURS PRN
Status: ON HOLD | COMMUNITY
End: 2022-01-10 | Stop reason: SDUPTHER

## 2021-08-28 RX ORDER — METOPROLOL SUCCINATE 25 MG/1
25 TABLET, EXTENDED RELEASE ORAL 2 TIMES DAILY
Status: DISCONTINUED | OUTPATIENT
Start: 2021-08-28 | End: 2021-08-31 | Stop reason: HOSPADM

## 2021-08-28 RX ORDER — PILOCARPINE HYDROCHLORIDE 5 MG/1
5 TABLET, FILM COATED ORAL 2 TIMES DAILY
COMMUNITY
End: 2021-12-23 | Stop reason: HOSPADM

## 2021-08-28 RX ORDER — LIDOCAINE 50 MG/G
1 PATCH TOPICAL ONCE
Status: COMPLETED | OUTPATIENT
Start: 2021-08-28 | End: 2021-08-29

## 2021-08-28 RX ORDER — AMLODIPINE BESYLATE 5 MG/1
5 TABLET ORAL DAILY
Status: DISCONTINUED | OUTPATIENT
Start: 2021-08-29 | End: 2021-08-31 | Stop reason: HOSPADM

## 2021-08-28 RX ORDER — BACLOFEN 10 MG/1
10 TABLET ORAL 3 TIMES DAILY
Status: DISCONTINUED | OUTPATIENT
Start: 2021-08-28 | End: 2021-08-31 | Stop reason: HOSPADM

## 2021-08-28 RX ORDER — FUROSEMIDE 20 MG/1
20 TABLET ORAL DAILY
COMMUNITY
End: 2021-11-16

## 2021-08-28 RX ORDER — FLUTICASONE PROPIONATE 50 MCG
1 SPRAY, SUSPENSION (ML) NASAL DAILY
Status: DISCONTINUED | OUTPATIENT
Start: 2021-08-29 | End: 2021-08-31 | Stop reason: HOSPADM

## 2021-08-28 RX ORDER — HYDROXYZINE HYDROCHLORIDE 25 MG/1
25 TABLET, FILM COATED ORAL AS NEEDED
COMMUNITY
End: 2021-09-17 | Stop reason: ALTCHOICE

## 2021-08-28 RX ORDER — LEVOTHYROXINE SODIUM 0.05 MG/1
50 TABLET ORAL DAILY
COMMUNITY
End: 2021-09-28 | Stop reason: SDUPTHER

## 2021-08-28 RX ORDER — TRAZODONE HYDROCHLORIDE 50 MG/1
150 TABLET ORAL
Status: DISCONTINUED | OUTPATIENT
Start: 2021-08-28 | End: 2021-08-31 | Stop reason: HOSPADM

## 2021-08-28 RX ORDER — LEVOTHYROXINE SODIUM 0.05 MG/1
50 TABLET ORAL
Status: DISCONTINUED | OUTPATIENT
Start: 2021-08-29 | End: 2021-08-31 | Stop reason: HOSPADM

## 2021-08-28 RX ORDER — AMLODIPINE BESYLATE 5 MG/1
5 TABLET ORAL DAILY
COMMUNITY
End: 2021-10-29

## 2021-08-28 RX ORDER — MEMANTINE HYDROCHLORIDE 10 MG/1
10 TABLET ORAL 2 TIMES DAILY PRN
COMMUNITY
End: 2021-12-23 | Stop reason: HOSPADM

## 2021-08-28 RX ORDER — ESTRADIOL 1 MG/1
1 TABLET ORAL DAILY
COMMUNITY
End: 2021-09-17 | Stop reason: SDUPTHER

## 2021-08-28 RX ORDER — TOPIRAMATE 100 MG/1
100 TABLET, FILM COATED ORAL 2 TIMES DAILY
Status: DISCONTINUED | OUTPATIENT
Start: 2021-08-29 | End: 2021-08-31 | Stop reason: HOSPADM

## 2021-08-28 RX ORDER — LOSARTAN POTASSIUM 50 MG/1
50 TABLET ORAL DAILY
Status: DISCONTINUED | OUTPATIENT
Start: 2021-08-29 | End: 2021-08-31 | Stop reason: HOSPADM

## 2021-08-28 RX ORDER — IBUPROFEN 600 MG/1
600 TABLET ORAL ONCE
Status: COMPLETED | OUTPATIENT
Start: 2021-08-28 | End: 2021-08-28

## 2021-08-28 RX ORDER — HYDROXYZINE HYDROCHLORIDE 25 MG/1
25 TABLET, FILM COATED ORAL EVERY 6 HOURS PRN
Status: DISCONTINUED | OUTPATIENT
Start: 2021-08-28 | End: 2021-08-31 | Stop reason: HOSPADM

## 2021-08-28 RX ORDER — MEMANTINE HYDROCHLORIDE 10 MG/1
10 TABLET ORAL 2 TIMES DAILY
Status: DISCONTINUED | OUTPATIENT
Start: 2021-08-29 | End: 2021-08-31 | Stop reason: HOSPADM

## 2021-08-28 RX ADMIN — LIDOCAINE 5% 1 PATCH: 700 PATCH TOPICAL at 22:47

## 2021-08-28 RX ADMIN — IBUPROFEN 600 MG: 600 TABLET, FILM COATED ORAL at 22:47

## 2021-08-28 NOTE — ED NOTES
Patient resting comfortably with lights and tv on in room   at bedside  's belongings locked up in visitor lockers       Sheila Nunez  08/28/21 1924

## 2021-08-28 NOTE — ED NOTES
Patient's  comes to window stating that patient is complaining of back pain  Nurse notified       Christine Ramírez  08/28/21 1953

## 2021-08-29 PROCEDURE — 96372 THER/PROPH/DIAG INJ SC/IM: CPT

## 2021-08-29 RX ORDER — PANTOPRAZOLE SODIUM 20 MG/1
20 TABLET, DELAYED RELEASE ORAL ONCE
Status: DISCONTINUED | OUTPATIENT
Start: 2021-08-29 | End: 2021-08-31 | Stop reason: HOSPADM

## 2021-08-29 RX ORDER — LORAZEPAM 1 MG/1
1 TABLET ORAL ONCE
Status: COMPLETED | OUTPATIENT
Start: 2021-08-29 | End: 2021-08-29

## 2021-08-29 RX ORDER — OLANZAPINE 10 MG/1
10 INJECTION, POWDER, LYOPHILIZED, FOR SOLUTION INTRAMUSCULAR ONCE
Status: COMPLETED | OUTPATIENT
Start: 2021-08-29 | End: 2021-08-29

## 2021-08-29 RX ADMIN — TRAZODONE HYDROCHLORIDE 150 MG: 50 TABLET ORAL at 23:10

## 2021-08-29 RX ADMIN — TRAZODONE HYDROCHLORIDE 150 MG: 50 TABLET ORAL at 00:38

## 2021-08-29 RX ADMIN — WATER: 1 INJECTION INTRAMUSCULAR; INTRAVENOUS; SUBCUTANEOUS at 13:21

## 2021-08-29 RX ADMIN — BACLOFEN 10 MG: 10 TABLET ORAL at 00:39

## 2021-08-29 RX ADMIN — METOPROLOL SUCCINATE 25 MG: 25 TABLET, EXTENDED RELEASE ORAL at 23:10

## 2021-08-29 RX ADMIN — METOPROLOL SUCCINATE 25 MG: 25 TABLET, EXTENDED RELEASE ORAL at 00:39

## 2021-08-29 RX ADMIN — OLANZAPINE 10 MG: 10 INJECTION, POWDER, LYOPHILIZED, FOR SOLUTION INTRAMUSCULAR at 12:40

## 2021-08-29 RX ADMIN — LORAZEPAM 1 MG: 1 TABLET ORAL at 00:38

## 2021-08-29 NOTE — ED NOTES
Pt refusing to get out of bed and move into other bed  Pt uncooperative and begins to scratch at herself to cause harm  Pt given multiple opportunities to comply and cooperate without success or improvement of behavior  Security summoned, pt moved in psych bed and 4 point hard restraints applied at this time for pt safety        Van Ye RN  08/29/21 3707

## 2021-08-29 NOTE — ED NOTES
Patient offered wash basin and toiletries to wash up  Patient declined and wanted portable phone and number out of her locker    Tech assisted with this request        Domenica Velasquez  08/29/21 0039

## 2021-08-29 NOTE — ED NOTES
Pt thrashing and screaming  Pt diaphoretic and not able to be redirected  Dr Kerry Haines made aware        Van Ye, RN  08/29/21 4808

## 2021-08-29 NOTE — ED NOTES
Ordered patient lunch tray per her request   Put in request to nurse to release alternate restraints as patient more calm and cooperative       Gustavo Lyn  08/29/21 1826

## 2021-08-29 NOTE — ED NOTES
Taking over patient observation 1:1 at this time  Patient sitting up on bed backwards scratching at [address] [facility]  Patient in distress at this time  Lights are on, tv is off  Will continue to monitor patient        Myriam Major  08/28/21 0206

## 2021-08-29 NOTE — ED NOTES
for patient called wanting information why patient was not given medications, placed into restraints and not given recliner   updated on policy and informed of patient's refusals for meals/hygiene supplies/medications        West Franco RN  08/29/21 0592

## 2021-08-29 NOTE — ED NOTES
Patient is sitting up in bed  In distress  Lights off, tv off  Will continue to monitor patient        Nat Fleischer  08/29/21 0426

## 2021-08-29 NOTE — ED NOTES
Went to give patient morning medications -- patient not answering my questions  Laying on stretcher with face covered  Still waiting for breakfast to be delivered  I let patient know I will return with meds when food arrives       Yuni Gutierrez RN  08/29/21 7252

## 2021-08-29 NOTE — ED NOTES
Pt presented to the ED with thoughts of SI, as she feels completely overwhelmed with the passing of her mother  Pt continued to complain about back pain and knee pain, stating that she needs a knee replacement, but would not answer any assessment questions  Pt willing to sign to come into the hospital for treatment

## 2021-08-29 NOTE — ED PROVIDER NOTES
History  Chief Complaint   Patient presents with    Psychiatric Evaluation     Pt arrives via EMS with complaint od SI and wrist cutting  Pt states that she has been feeling more depressed after the death of her mother recently  Pt has superficail abrasions on both forearms  Patient is a 81-year-old female with history of chronic back pain, depression and self injury who presents with suicidal ideations  Patient arrives via EMS with severe depression and complaints of suicidal ideations  She admits to cutting her bilateral forearms  She has a history cutting but states that her current abrasions are very recent  Her mother  earlier this month and patient has been feeling more depressed since this happened  She admits to suicidal ideations but does not provide any additional details  She does admit to a psychiatric hospitalization around the   She is tearful and is not open about her current mental health issues  She is willing to talk about her chronic back pain  She denies any new injuries  She states that her pain is similar to her chronic pain  She has no additional complaints at this time  History provided by:  Patient  Psychiatric Evaluation  Presenting symptoms: depression, self-mutilation and suicidal thoughts    Degree of incapacity (severity):  Severe  Timing:  Constant  Progression:  Worsening  Chronicity:  Chronic  Treatment compliance: All of the time  Associated symptoms: no abdominal pain, no chest pain and no headaches    Risk factors: hx of mental illness and recent psychiatric admission        Prior to Admission Medications   Prescriptions Last Dose Informant Patient Reported? Taking?    COLESTIPOL HCL PO   Yes Yes   Sig: Take 1 g by mouth 2 (two) times a day   Cholecalciferol (Vitamin D3) 125 MCG (5000 UT) TABS   Yes Yes   Sig: Take 5,000 Units by mouth daily   Cyanocobalamin (VITAMIN B-12 PO)   Yes Yes   Sig: Take by mouth daily   Divalproex Sodium (DEPAKOTE PO) Yes Yes   Sig: Take 750 mg by mouth daily   Fesoterodine Fumarate ER (Toviaz) 4 MG TB24   Yes Yes   Sig: Take 4 mg by mouth daily   Lurasidone HCl (LATUDA PO)   Yes Yes   Sig: Take 600 mg by mouth daily   RABEprazole (ACIPHEX) 20 MG tablet   Yes Yes   Sig: Take 20 mg by mouth daily   albuterol (PROVENTIL HFA,VENTOLIN HFA) 90 mcg/act inhaler   Yes Yes   Sig: Inhale 2 puffs every 6 (six) hours as needed for wheezing   amLODIPine (NORVASC) 5 mg tablet   Yes Yes   Sig: Take 5 mg by mouth daily   baclofen 10 mg tablet   Yes Yes   Sig: Take 10 mg by mouth 3 (three) times a day   buPROPion HCl (WELLBUTRIN XL PO)   Yes Yes   Sig: Take 450 mg by mouth daily   estradiol (ESTRACE) 1 mg tablet   Yes Yes   Sig: Take 1 mg by mouth daily   fluticasone (FLONASE) 50 mcg/act nasal spray   Yes Yes   Si spray into each nostril daily   furosemide (LASIX) 20 mg tablet   Yes Yes   Sig: Take 20 mg by mouth daily   hydrOXYzine HCL (ATARAX) 25 mg tablet   Yes Yes   Sig: Take 25 mg by mouth as needed for itching or anxiety   levothyroxine 50 mcg tablet   Yes Yes   Sig: Take 50 mcg by mouth daily   loratadine (CLARITIN) 10 mg tablet   Yes Yes   Sig: Take 10 mg by mouth daily   losartan (COZAAR) 50 mg tablet   Yes Yes   Sig: Take 50 mg by mouth daily   memantine (NAMENDA) 10 mg tablet   Yes Yes   Sig: Take 10 mg by mouth 2 (two) times a day   metoprolol succinate (TOPROL-XL) 25 mg 24 hr tablet   Yes Yes   Sig: Take 25 mg by mouth 2 (two) times a day   pilocarpine (SALAGEN) 5 mg tablet   Yes Yes   Sig: Take 5 mg by mouth 2 (two) times a day   topiramate (TOPAMAX) 100 mg tablet   Yes Yes   Sig: Take 100 mg by mouth 2 (two) times a day   traZODone (DESYREL) 150 mg tablet   Yes Yes   Sig: Take 150 mg by mouth daily at bedtime      Facility-Administered Medications: None       Past Medical History:   Diagnosis Date    Anxiety     Arthritis     oa cassandra knees    Asthma     good control- no medications    Yan's esophagus     Bipolar affective disorder (HCC)     Chronic pain disorder     lumbar    CPAP (continuous positive airway pressure) dependence     Degenerative disc disease at L5-S1 level     Deliberate self-cutting     Depression 09/16/2008    Disease of thyroid gland     hypo    MARTINEZ (dyspnea on exertion)     Drug overdose 10/28/2008    suicide attempt    Dysphagia     Dyspnea     Edema     BLE    GERD (gastroesophageal reflux disease)     High blood pressure     History of COVID-19 12/30/2020    Symptoms started on 12/30/2020 and then got worse  Today she is feeling a little bit better  She is a candidate for monoclonal antibody infusion and set for 1/6/21 @ 1pm at [facility]  01/07/21 - telemedicine -     Hypertension     Knee pain, bilateral     Especially right    Migraines     Obese     Overactive bladder     Sjogren's disease ([address] )     Sleep apnea     Stress incontinence     Suicidal ideations     Umbilical hernia     surgical repair today 4/24/2019    Use of cane as ambulatory aid     awaiting b/l knee replacement    Wears glasses        Past Surgical History:   Procedure Laterality Date    BREAST BIOPSY Right 1989    benign    CARPAL TUNNEL RELEASE Left     CHOLECYSTECTOMY  05/2003    Laparoscopic    COLONOSCOPY      01/12/2009    DILATION AND CURETTAGE OF UTERUS      ELBOW SURGERY Left     x2   No hardware    ESOPHAGOGASTRODUODENOSCOPY      FOOT SURGERY Left     Plantar fasciotomy    HYSTERECTOMY      laporoscopic, partial    KNEE ARTHROSCOPY Bilateral     OOPHORECTOMY Left 10/2015    HI ANAL SPHINCTEROTOMY N/A 8/31/2018    Procedure: EUA, LEFT PARTIAL INTERNAL SPHINCTEROTOMY;  Surgeon: Donita Azar MD;  Location: [facility] MAIN OR;  Service: Colorectal    HI GASTROCNEMIUS RECESSION Left 2/24/2021    Procedure: RECESSION GASTROC OPEN;  Surgeon: Rhoda Silvestre DPM;  Location: [facility] MAIN OR;  Service: [address],4+[address]/J,OZQUP N/A 4/24/2019    Procedure: REPAIR HERNIA UMBILICAL LAPAROSCOPIC W/ ROBOTICS;  Surgeon: Joselo Madrid MD;  Location: [facility] Main OR;  Service: General    REDUCTION MAMMAPLASTY Bilateral 1999    REPAIR LACERATION Left     left hand-5/18/2009    ROTATOR CUFF REPAIR Left     TONSILECTOMY AND ADNOIDECTOMY      TONSILLECTOMY      US GUIDED MSK PROCEDURE  4/22/2021    VEIN LIGATION Bilateral     WISDOM TOOTH EXTRACTION         Family History   Problem Relation Age of Onset    Kidney cancer Mother     Diabetes Mother     Colon cancer Family     No Known Problems Father     No Known Problems Sister     Colon cancer Paternal Uncle     Colon cancer Maternal Uncle     Colon cancer Maternal Uncle      I have reviewed and agree with the history as documented  E-Cigarette/Vaping    E-Cigarette Use Current Some Day User     Comments medical THC      E-Cigarette/Vaping Substances    Nicotine No     THC Yes     CBD No     Flavoring No     Other No     Unknown No      Social History     Tobacco Use    Smoking status: Former Smoker     Packs/day: 3 00     Types: Cigarettes     Quit date: 1/2/2018     Years since quitting: 3 6    Smokeless tobacco: Never Used    Tobacco comment: Started at age 14  Vaping Use    Vaping Use: Some days    Substances: THC   Substance Use Topics    Alcohol use: Not Currently     Comment: Recovering alcoholic    Drug use: Yes     Types: Marijuana, Methamphetamines     Comment: medical- vapes 3 times per wk at        Review of Systems   Constitutional: Negative for chills, diaphoresis and fever  HENT: Negative for nosebleeds, sore throat and trouble swallowing  Eyes: Negative for photophobia, pain and visual disturbance  Respiratory: Negative for cough, chest tightness and shortness of breath  Cardiovascular: Negative for chest pain, palpitations and leg swelling  Gastrointestinal: Negative for abdominal pain, constipation, diarrhea, nausea and vomiting     Endocrine: Negative for polydipsia and polyuria  Genitourinary: Negative for difficulty urinating, dysuria, hematuria, pelvic pain, vaginal bleeding and vaginal discharge  Musculoskeletal: Negative for back pain, neck pain and neck stiffness  Skin: Negative for pallor and rash  Neurological: Negative for dizziness, seizures, light-headedness and headaches  Psychiatric/Behavioral: Positive for self-injury and suicidal ideas  All other systems reviewed and are negative  Physical Exam  Physical Exam  Vitals and nursing note reviewed  Constitutional:       General: She is in acute distress (tearful)  Appearance: She is well-developed  HENT:      Head: Normocephalic and atraumatic  Eyes:      Pupils: Pupils are equal, round, and reactive to light  Cardiovascular:      Rate and Rhythm: Normal rate and regular rhythm  Pulses: Normal pulses  Heart sounds: Normal heart sounds  Pulmonary:      Effort: Pulmonary effort is normal  No respiratory distress  Breath sounds: Normal breath sounds  Abdominal:      General: There is no distension  Palpations: Abdomen is soft  Abdomen is not rigid  Tenderness: There is no abdominal tenderness  There is no guarding or rebound  Musculoskeletal:         General: No tenderness  Normal range of motion  Cervical back: Normal range of motion and neck supple  Lymphadenopathy:      Cervical: No cervical adenopathy  Skin:     General: Skin is warm and dry  Capillary Refill: Capillary refill takes less than 2 seconds  Neurological:      Mental Status: She is alert and oriented to person, place, and time  Cranial Nerves: No cranial nerve deficit  Sensory: No sensory deficit  Psychiatric:         Mood and Affect: Mood is depressed  Affect is tearful  Thought Content: Thought content is not paranoid or delusional  Thought content includes suicidal ideation  Thought content does not include homicidal ideation   Thought content does not include homicidal or suicidal plan           Vital Signs  ED Triage Vitals [08/28/21 1900]   Temperature Pulse Respirations Blood Pressure SpO2   98 8 °F (37 1 °C) 84 20 125/67 94 %      Temp Source Heart Rate Source Patient Position - Orthostatic VS BP Location FiO2 (%)   Oral Monitor Lying Right arm --      Pain Score       7           Vitals:    08/28/21 1900 08/28/21 2345 08/29/21 0603 08/29/21 2207   BP: 125/67 125/75 124/75 132/73   Pulse: 84 68 69 66   Patient Position - Orthostatic VS: Lying Sitting Sitting Sitting         Visual Acuity      ED Medications  Medications   losartan (COZAAR) tablet 50 mg (50 mg Oral Not Given 8/29/21 1142)   amLODIPine (NORVASC) tablet 5 mg (5 mg Oral Not Given 8/29/21 1142)   baclofen tablet 10 mg (10 mg Oral Not Given 8/29/21 2300)   divalproex sodium (DEPAKOTE) EC tablet 750 mg (750 mg Oral Not Given 8/29/21 1143)   memantine (NAMENDA) tablet 10 mg (10 mg Oral Not Given 8/29/21 1952)   metoprolol succinate (TOPROL-XL) 24 hr tablet 25 mg (25 mg Oral Given 8/29/21 2310)   pilocarpine (SALAGEN) tablet 5 mg (5 mg Oral Not Given 8/29/21 1952)   topiramate (TOPAMAX) tablet 100 mg (100 mg Oral Not Given 8/29/21 1953)   fluticasone (FLONASE) 50 mcg/act nasal spray 1 spray (1 spray Each Nare Not Given 8/29/21 1144)   furosemide (LASIX) tablet 20 mg (20 mg Oral Not Given 8/29/21 1144)   hydrOXYzine HCL (ATARAX) tablet 25 mg (has no administration in time range)   levothyroxine tablet 50 mcg (50 mcg Oral Not Given 8/29/21 1142)   traZODone (DESYREL) tablet 150 mg (150 mg Oral Given 8/29/21 2310)   pantoprazole (PROTONIX) EC tablet 20 mg (20 mg Oral Not Given 8/29/21 1951)   ibuprofen (MOTRIN) tablet 600 mg (600 mg Oral Given 8/28/21 2247)   lidocaine (LIDODERM) 5 % patch 1 patch (1 patch Topical Medication Applied 8/28/21 4552)   LORazepam (ATIVAN) tablet 1 mg (1 mg Oral Given 8/29/21 0038)   OLANZapine (ZyPREXA) IM injection 10 mg (10 mg Intramuscular Given 8/29/21 1240)   sterile water injection **ADS Override Pull** (  Given 8/29/21 1321)       Diagnostic Studies  Results Reviewed     Procedure Component Value Units Date/Time    Novel Coronavirus [facility] [[id number]]  (Normal) Collected: 08/28/21 2049    Lab Status: Final result Specimen: Nares from Nose Updated: 08/28/21 2144     SARS-CoV-2 Negative    Narrative: The specimen collection materials, transport medium, and/or testing methodology utilized in the production of these test results have been proven to be reliable in a limited validation with an abbreviated program under the Emergency Utilization Authorization provided by the FDA  Testing reported as "Presumptive positive" will be confirmed with secondary testing to ensure result accuracy  Clinical caution and judgement should be used with the interpretation of these results with consideration of the clinical impression and other laboratory testing  Testing reported as "Positive" or "Negative" has been proven to be accurate according to standard laboratory validation requirements  All testing is performed with control materials showing appropriate reactivity at standard intervals  Rapid drug screen, urine [[id number]]  (Abnormal) Collected: 08/28/21 2035    Lab Status: Final result Specimen: Urine, Clean Catch Updated: 08/28/21 2049     Amph/Meth UR Positive     Barbiturate Ur Negative     Benzodiazepine Urine Negative     Cocaine Urine Negative     Methadone Urine Negative     Opiate Urine Negative     PCP Ur Negative     THC Urine Positive     Oxycodone Urine Negative    Narrative:      Presumptive report  If requested, specimen will be sent to reference lab for confirmation  FOR MEDICAL PURPOSES ONLY  IF CONFIRMATION NEEDED PLEASE CONTACT THE LAB WITHIN 5 DAYS      Drug Screen Cutoff Levels:  AMPHETAMINE/METHAMPHETAMINES  1000 ng/mL  BARBITURATES     200 ng/mL  BENZODIAZEPINES     200 ng/mL  COCAINE      300 ng/mL  METHADONE      300 ng/mL  OPIATES      300 ng/mL  PHENCYCLIDINE     25 ng/mL  THC       50 ng/mL  OXYCODONE      100 ng/mL    POCT alcohol breath test [[id number]]  (Normal) Resulted: 08/28/21 2046    Lab Status: Final result Updated: 08/28/21 2046     EXTBreath Alcohol 0 000    POCT pregnancy, urine [[id number]]  (Normal) Resulted: 08/28/21 2034    Lab Status: Final result Updated: 08/28/21 2035     EXT PREG TEST UR (Ref: Negative) negative     Control valid                 No orders to display              Procedures  Procedures         ED Course  ED Course as of Aug 30 0159   Joie Cockayne Aug 29, 2021   2123 I was notified by nursing staff that patient went in the bathroom and tied to socks together and place them around her neck  Staff immediately walked in and removed the socks from her possession  Patient had no visible marks on her neck  She had no respiratory distress  She was placed back in the room and sheets were removed  Overnight, she will be placed in the main ED in order to place her on CPAP which she wears nightly  However she will have a constant 1-1 observation                                SBIRT 22yo+      Most Recent Value   SBIRT (24 yo +)   In order to provide better care to our patients, we are screening all of our patients for alcohol and drug use  Would it be okay to ask you these screening questions? No Filed at: 08/28/2021 2115                    MDM  Number of Diagnoses or Management Options  Depression: established and worsening  Suicidal ideations: new and requires workup  Diagnosis management comments: Patient with a history of depression presents with worsening depression and suicidal ideations  She is very tearful and guarded during my initial conversations with her  She did speak with crisis and has signed a 201  Will look for psychiatric placement for inpatient treatment  She is medically stable for inpatient psychiatric treatment  She is on CPAP nightly which we will continue in the emergency department    She complains of chronic back pain which is unchanged from baseline  I have ordered analgesics  I have also ordered her home medications  Amount and/or Complexity of Data Reviewed  Clinical lab tests: ordered and reviewed  Tests in the medicine section of CPT®: ordered and reviewed  Review and summarize past medical records: yes  Discuss the patient with other providers: yes  Independent visualization of images, tracings, or specimens: yes    Risk of Complications, Morbidity, and/or Mortality  Presenting problems: high  Diagnostic procedures: low  Management options: moderate    Patient Progress  Patient progress: stable      Disposition  Final diagnoses:   Depression   Suicidal ideations     Time reflects when diagnosis was documented in both MDM as applicable and the Disposition within this note     Time User Action Codes Description Comment    8/28/2021 11:59 PM Ernesto Yost [F32 9] Depression     8/28/2021 11:59 PM Ernesto Burnett Add [S08 349] Suicidal ideations       ED Disposition     None      MD Documentation      Most Recent Value   Sending MD Dr Davis James, DO      Follow-up Information    None         Patient's Medications   Discharge Prescriptions    No medications on file     No discharge procedures on file      PDMP Review       Value Time User    PDMP Reviewed  Yes 6/16/2021  3:07 PM Marisol Haynes MD          ED Provider  Electronically Signed by           Reagan Luna, DO  08/29/21 Judson 40, DO  08/30/21 0618

## 2021-08-29 NOTE — ED NOTES
Pt uses a CPAP  Bed search efforts:      [facility] - Per Tonie Darron , received referral, no female beds   [facility] - Per Indira Wray, they are completely full today, no scheduled discharges  [facility] - Called x2, left VM with admissions, requesting call back   [facility], [facility] beds  [facility] - Per [place], no female beds or any scheduled discharges  [facility] - Per admissions, no adult beds  [facility]- Per [place], no female beds  [facility] - Per [place], no female beds  [facility]- Per [place], aware of CPAP, and wiill review for a possible dc bed; faxed 201 and chart  [facility] - Per Arian Castillo - No adult beds   [facility] - Per Mango Mcdonough, no network [facility] -  Per [facility], no beds, full today, and tomorrow     Crisis to f/u   Melissa with [facility] wants to be notified of disposition at [phone]

## 2021-08-29 NOTE — ED NOTES
Pt is awake, agitated, and in distress  Pt still hasn't received breakfast  Gave pt snacks and juice  Pt was hanging off bed, redirected pt onto bed and put 2nd bed rail up  Will continue to monitor       Audrey Poole  08/29/21 1807

## 2021-08-29 NOTE — ED NOTES
Called [facility] and spoke with Fabi Murrell in Admissions who stated they have referral but have not yet been able to review  [facility] f/u with Crisis

## 2021-08-29 NOTE — ED NOTES
Patient laying on stretcher, restless and mildly agitated  Will not answer questions, continues to refuse her morning medications  States "she just wants to be with her mom " Will reattempt to give medications when breakfast tray available       Cristiano Arroyo RN  08/29/21 [phone]

## 2021-08-29 NOTE — ED NOTES
Patient lying in bed sleeping  Not in distress  Lights off and tv off  Will continue to monitor patient       Zaida Jesús  08/29/21 5411

## 2021-08-29 NOTE — ED NOTES
Pt insurance is primary [employer] with [facility] with [facility] when accepting facility is found

## 2021-08-29 NOTE — ED NOTES
Pt was not forth coming with information during assessment, but patient has superficial scars from cutting on her wrists  Pt also has a CPAP machine, and will need a different room to accommodate this medical device  Charge nurse notified

## 2021-08-29 NOTE — ED NOTES
Patient taken out of 4 point restraints by charge nurse  Patient calm and cooperative at this time       Amanda Miles  08/29/21 6315

## 2021-08-29 NOTE — ED NOTES
Patient is lying in bed sleeping  Not in distress  Lights off, tv off  Will continue to monitor patient       Guy Michael  08/29/21 2031

## 2021-08-29 NOTE — ED NOTES
Pt is laying on stretcher with face covered  Pt is irritated because breakfast hasn't arrived  Offered pt a snack or juice, pt declined  Will continue to monitor        Augustin Angry  08/29/21 0959

## 2021-08-29 NOTE — ED NOTES
Patient is lying in bed sleeping  Not in distress  Lights on, tv off  Will continue to monitor patient        Guy Michael  08/29/21 0121

## 2021-08-29 NOTE — ED NOTES
Patient is lying in bed sleeping  Not in distress at this time  Lights are on, tv off  Will continue to monitor patient        Radha Samaniego  08/29/21 0026

## 2021-08-29 NOTE — ED NOTES
Taking over pt 1:1 at this time  Pt is currently on stretcher sleeping  Will continue to monitor pt       Rebecca [place]  08/29/21 6496

## 2021-08-29 NOTE — ED CARE HANDOFF
07/30/20 1426   Final Note   Assessment Type Final Discharge Note   Anticipated Discharge Disposition Home   What phone number can be called within the next 1-3 days to see how you are doing after discharge? [phone]   Discharge plans and expectations educations in teach back method with documentation complete? Yes   Right Care Referral Info   Post Acute Recommendation No Care   Post-Acute Status   Post-Acute Authorization Other   Other Status No Post-Acute Service Needs     Future Appointments   Date Time Provider Department Center   8/19/2020  9:00 AM [facility]   8/22/2020  8:30 AM Beverly Muniz MD [facility]   8/28/2020 11:30 AM PARESH Lacy, FNP [facility]   \   Emergency Department Sign Out Note        Sign out and transfer of care from Dr Viviana Alfonso  See Separate Emergency Department note  The patient, Nina Pollock, was evaluated by the previous provider for suicidal ideation with depression  Workup Completed:  yes    ED Course / Workup Pending (followup):  201 placement as per crisis  Signed out to Dr Garry Arias in AM                                       Procedures  MDM    Disposition  Final diagnoses:   Depression   Suicidal ideations     Time reflects when diagnosis was documented in both MDM as applicable and the Disposition within this note     Time User Action Codes Description Comment    8/28/2021 11:59 PM Herberth Can Add [F32 9] Depression     8/28/2021 11:59 PM Herberth Can Add [L19 213] Suicidal ideations       ED Disposition     None      MD Documentation      Most Recent Value   Sending MD Dr Del Rio May, DO      Follow-up Information    None       Patient's Medications   Discharge Prescriptions    No medications on file     No discharge procedures on file         ED Provider  Electronically Signed by     Reed Alvares MD  08/29/21 0229

## 2021-08-29 NOTE — ED NOTES
Patient comes to [facility] with complaints of "unwellness"  Will notify nurse       Liliana Dillon  08/29/21 1820

## 2021-08-29 NOTE — ED NOTES
Patient is lying in bed sleeping  Not in distress  Lights off,tv off  Will continue to monitor patient        Emperatriz Milagros  08/29/21 5696

## 2021-08-29 NOTE — ED NOTES
Patient continues to thrash around in her bed and kicking her left foot onto the wooden part of bed  1;1 sitter is present       Elza Randhawa  08/29/21 120

## 2021-08-29 NOTE — ED NOTES
Pt is very restless, upset, and in distress  Pt laying in bed, rolling and kicking  Pt won't respond when asked questions  Will continue to monitor        Sayra Novoa  08/29/21 1038

## 2021-08-30 VITALS
RESPIRATION RATE: 18 BRPM | DIASTOLIC BLOOD PRESSURE: 98 MMHG | HEART RATE: 71 BPM | WEIGHT: 272 LBS | OXYGEN SATURATION: 98 % | SYSTOLIC BLOOD PRESSURE: 156 MMHG | TEMPERATURE: 98.1 F | BODY MASS INDEX: 45.26 KG/M2

## 2021-08-30 PROCEDURE — 96372 THER/PROPH/DIAG INJ SC/IM: CPT

## 2021-08-30 RX ORDER — OLANZAPINE 10 MG/1
10 INJECTION, POWDER, LYOPHILIZED, FOR SOLUTION INTRAMUSCULAR ONCE
Status: COMPLETED | OUTPATIENT
Start: 2021-08-30 | End: 2021-08-30

## 2021-08-30 RX ORDER — MONTELUKAST SODIUM 4 MG/1
1 TABLET, CHEWABLE ORAL 2 TIMES DAILY
Status: DISCONTINUED | OUTPATIENT
Start: 2021-08-30 | End: 2021-08-31 | Stop reason: HOSPADM

## 2021-08-30 RX ORDER — ALBUTEROL SULFATE 90 UG/1
2 AEROSOL, METERED RESPIRATORY (INHALATION) ONCE
Status: DISCONTINUED | OUTPATIENT
Start: 2021-08-30 | End: 2021-08-31 | Stop reason: HOSPADM

## 2021-08-30 RX ORDER — OLANZAPINE 10 MG/1
INJECTION, POWDER, LYOPHILIZED, FOR SOLUTION INTRAMUSCULAR
Status: COMPLETED
Start: 2021-08-30 | End: 2021-08-30

## 2021-08-30 RX ORDER — ACETAMINOPHEN 325 MG/1
975 TABLET ORAL ONCE
Status: COMPLETED | OUTPATIENT
Start: 2021-08-30 | End: 2021-08-30

## 2021-08-30 RX ADMIN — BACLOFEN 10 MG: 10 TABLET ORAL at 17:16

## 2021-08-30 RX ADMIN — OLANZAPINE 10 MG: 10 INJECTION, POWDER, FOR SOLUTION INTRAMUSCULAR at 17:17

## 2021-08-30 RX ADMIN — FUROSEMIDE 20 MG: 40 TABLET ORAL at 08:35

## 2021-08-30 RX ADMIN — OLANZAPINE 10 MG: 10 INJECTION, POWDER, FOR SOLUTION INTRAMUSCULAR at 21:26

## 2021-08-30 RX ADMIN — HYDROXYZINE HYDROCHLORIDE 25 MG: 25 TABLET ORAL at 13:48

## 2021-08-30 RX ADMIN — DIVALPROEX SODIUM 750 MG: 250 TABLET, DELAYED RELEASE ORAL at 08:30

## 2021-08-30 RX ADMIN — BACLOFEN 10 MG: 10 TABLET ORAL at 08:25

## 2021-08-30 RX ADMIN — LEVOTHYROXINE SODIUM 50 MCG: 50 TABLET ORAL at 06:30

## 2021-08-30 RX ADMIN — FLUTICASONE PROPIONATE 1 SPRAY: 50 SPRAY, METERED NASAL at 08:30

## 2021-08-30 RX ADMIN — MEMANTINE 10 MG: 10 TABLET ORAL at 08:25

## 2021-08-30 RX ADMIN — AMLODIPINE BESYLATE 5 MG: 5 TABLET ORAL at 08:30

## 2021-08-30 RX ADMIN — METOPROLOL SUCCINATE 25 MG: 25 TABLET, EXTENDED RELEASE ORAL at 08:30

## 2021-08-30 RX ADMIN — TOPIRAMATE 100 MG: 100 TABLET, FILM COATED ORAL at 08:24

## 2021-08-30 RX ADMIN — ACETAMINOPHEN 975 MG: 325 TABLET, FILM COATED ORAL at 07:38

## 2021-08-30 RX ADMIN — OLANZAPINE 10 MG: 10 INJECTION, POWDER, LYOPHILIZED, FOR SOLUTION INTRAMUSCULAR at 21:26

## 2021-08-30 RX ADMIN — PILOCARPINE HYDROCHLORIDE 5 MG: 5 TABLET, FILM COATED ORAL at 08:25

## 2021-08-30 RX ADMIN — LOSARTAN POTASSIUM 50 MG: 50 TABLET, FILM COATED ORAL at 08:30

## 2021-08-30 NOTE — ED NOTES
Bed search efforts:    No network beds available at present per [facility] 3 open male beds and a pending discharge that would open an additional male bed and 1 potential female bed per Sandra Silvestre; referral faxed    [facility] - [facility] per Erinn Teague; may call back later to inquire on discharges    [facility] - one male 201 bed and one female 302 bed available per Zo[facility]; no appropriate bed    [facility] - [place], they can review; referral faxed    [facility] - per [facility], they have one adult female dual bed available; no appropriate bed    [facility] - per [facility], they can review for tomorrow; referral faxed

## 2021-08-30 NOTE — EMTALA/ACUTE CARE TRANSFER
[address] [place] [address]  Dept: [phone]      EMTALA TRANSFER CONSENT    NAME Ivette Mcmahon                                         1970                              MRN [id number]    I have been informed of my rights regarding examination, treatment, and transfer   by Dr Byron Gaston MD    Benefits: Specialized equipment and/or services available at the receiving facility (Include comment)________________________    Risks: Potential for delay in receiving treatment          I authorize the performance of emergency medical procedures and treatments upon me in both transit and upon arrival at the receiving facility  Additionally, I authorize the release of any and all medical records to the receiving facility and request they be transported with me, if possible  I understand that the safest mode of transportation during a medical emergency is an ambulance and that the Hospital advocates the use of this mode of transport  Risks of traveling to the receiving facility by car, including absence of medical control, life sustaining equipment, such as oxygen, and medical personnel has been explained to me and I fully understand them  (LONI CORRECT BOX BELOW)  [ x ]  I consent to the stated transfer and to be transported by ambulance/helicopter  [  ]  I consent to the stated transfer, but refuse transportation by ambulance and accept full responsibility for my transportation by car    I understand the risks of non-ambulance transfers and I exonerate the Hospital and its staff from any deterioration in my condition that results from this refusal     X___________________________________________    DATE  21  TIME________  Signature of patient or legally responsible individual signing on patient behalf           RELATIONSHIP TO PATIENT_________________________          Provider Certification    NAME Ivette Mcmahon  1970                              MRN [id number]    A medical screening exam was performed on the above named patient  Based on the examination:    Condition Necessitating Transfer The primary encounter diagnosis was Depression  A diagnosis of Suicidal ideations was also pertinent to this visit  Patient Condition: The patient has been stabilized such that within reasonable medical probability, no material deterioration of the patient condition or the condition of the unborn child(jessica) is likely to result from the transfer    Reason for Transfer: Level of Care needed not available at this facility    Transfer Requirements: [address] [place] [address]    · Space available and qualified personnel available for treatment as acknowledged by pacs  · Agreed to accept transfer and to provide appropriate medical treatment as acknowledged by       dr Imelda Dial  · Appropriate medical records of the examination and treatment of the patient are provided at the time of transfer   [address] [place] [address] [address] _______  · Transfer will be performed by qualified personnel from    and appropriate transfer equipment as required, including the use of necessary and appropriate life support measures      Provider Certification: I have examined the patient and explained the following risks and benefits of being transferred/refusing transfer to the patient/family:  General risk, such as traffic hazards, adverse weather conditions, rough terrain or turbulence, possible failure of equipment (including vehicle or aircraft), or consequences of actions of persons outside the control of the transport personnel      Based on these reasonable risks and benefits to the patient and/or the unborn child(jessica), and based upon the information available at the time of the patients examination, I certify that the medical benefits reasonably to be expected from the provision of appropriate medical treatments at another [facility] facility outweigh the increasing risks, if any, to the individuals medical condition, and in the case of labor to the unborn child, from effecting the transfer      X____________________________________________ DATE 08/30/21        TIME_______      ORIGINAL - SEND TO MEDICAL RECORDS   COPY - SEND WITH PATIENT DURING TRANSFER

## 2021-08-30 NOTE — ED CARE HANDOFF
Emergency Department Sign Out Note        Sign out and transfer of care from Dr Mike Wheeler  See Separate Emergency Department note  The patient, Vimal Garvey, was evaluated by the previous provider for psychiatric evaluation  Workup Completed:  Patient has been medically cleared  ED Course / Workup Pending (followup):  0200: I was notified earlier that patient wrapped a sock around her neck  However there were no marks on her neck and she was not in distress  She is now in a room under close observation  She is wearing her CPAP and has no signs of distress  She is currently awaiting psychiatric placement  Will continue to monitor and continue 1:1 observation                                ED Course as of Aug 30 0232   [place] Aug 29, 2021   2123 I was notified by nursing staff that patient went in the bathroom and tied to socks together and place them around her neck  Staff immediately walked in and removed the socks from her possession  Patient had no visible marks on her neck  She had no respiratory distress  She was placed back in the room and sheets were removed  Overnight, she will be placed in the main ED in order to place her on CPAP which she wears nightly  However she will have a constant 1-1 observation        Procedures  MDM    Disposition  Final diagnoses:   Depression   Suicidal ideations     Time reflects when diagnosis was documented in both MDM as applicable and the Disposition within this note     Time User Action Codes Description Comment    8/28/2021 11:59 PM Chas Gloria Add [F32 9] Depression     8/28/2021 11:59 PM Chas Gloria Add [P12 843] Suicidal ideations       ED Disposition     None      MD Documentation      Most Recent Value   Sending MD Dr Randall Child, DO      Follow-up Information    None       Patient's Medications   Discharge Prescriptions    No medications on file     No discharge procedures on file         ED Provider  Electronically Signed by Mini Galvin,   08/30/21 8065

## 2021-08-30 NOTE — ED CARE HANDOFF
Emergency Department Sign Out Note        Sign out and transfer of care from Dr Lauren napier  See Separate Emergency Department note  The patient, Jamila Bravo, was evaluated by the previous provider for suicidal ideation with depression  Workup Completed:  yes    ED Course / Workup Pending (followup):  201 placement as per crisis  Signed out to Dr Alfa Roy in AM                                       Procedures  MDM    Disposition  Final diagnoses:   Depression   Suicidal ideations     Time reflects when diagnosis was documented in both MDM as applicable and the Disposition within this note     Time User Action Codes Description Comment    8/28/2021 11:59 PM Ivor Child Add [F32 9] Depression     8/28/2021 11:59 PM Abeba Child Add [E25 319] Suicidal ideations       ED Disposition     None      MD Documentation      Most Recent Value   Sending MD Dr Trevor Bianchi, DO      Follow-up Information    None       Patient's Medications   Discharge Prescriptions    No medications on file     No discharge procedures on file         ED Provider  Electronically Signed by     Troy Castaneda MD  08/30/21 7114

## 2021-08-30 NOTE — ED NOTES
Pt laying on stretcher with HOB elevated, eyes closed, in negative distress  Respirations regular and non-labored  VS  1:1 constant/close observation continued by ED [facility] ([facility])  Will continue to monitor for safety       Chelsea Suazo RN  08/29/21 5632

## 2021-08-30 NOTE — ED NOTES
Respiratory at bedside to set up pt's home CPAP machine   Pt calm and cooperative     Elver Griffin RN  08/29/21 5088

## 2021-08-30 NOTE — ED NOTES
Patient is accepted at [facility], [employer]  Patient is accepted by Dr Racheal Mantilla per [place]  Transportation is arranged with [facility]  Transportation is scheduled for TBD  Patient may go to the floor at [phone] or later  Intake advised of the external transfer  Nurse report is to be called to [phone] prior to patient transfer

## 2021-08-30 NOTE — ED NOTES
Pt states that she is looking for anything and everything in the room to harm herself  Pt also stated that she just wants to die and not be here anymore       Fab Louise  08/30/21 2979

## 2021-08-30 NOTE — ED NOTES
Cw assisted tech's in clearing patient from choking herself with socks  Escorted patient back to her room, and removed all sheets and pillows from the room  Discussed with nurse and treatment team medication for patient due to anxiety  Discussed attention seeking behaviors from patient  Patient calm and resting in room at this time  Recommendation Preamble: If you have any unanswered questions or new concerns we want you to let us know. Please call us if you have any questions.\\n\\nFor more information about your diagnosis we recommend the following resources:\\n\\nwww.dermnet[facility].org/sitemap \"Topics A-Z\"\\n\\nwww.aad.org/for-the-public \"For the Public: Dermatology A-Z\"\\n\\nwww.aocd.org \"Disease Database\"\\n Detail Level: Zone

## 2021-08-30 NOTE — ED NOTES
[facility]) received clarifying information about cane versus walker usage, CPAP, and updated vitals  She will review case with the physician and call back

## 2021-08-30 NOTE — ED NOTES
Pt stated that she just wants to be with her mother and that she is very suicidal and if she sees an opportunity she will take it  Pt seems to be very sad       Forrest Bermudez  08/30/21 7821

## 2021-08-30 NOTE — ED NOTES
Pt sitting up right on stretcher, pt removed CPAP mask and placed it on the side table  Dr Tunde Barbour at bedside  Pt admitted she was having back pain but "nothing helps it" Pt requested HOB to be elevated "just another notch"  Pt now requesting CPAP mask back on, placed on by self with negative complications  Blankets provided  1:1 constant/close observation continued by PT VIDAL Leblanc, RN  08/30/21 6223

## 2021-08-30 NOTE — ED NOTES
Spoke with  (who's company has ten years of history with patient) for suggestions on how to make patient more comfortable while setting boundaries for patient's manipulative behavior  Patient makes various complaints (such as wanting hygiene items) while simultaneously refusing them, appearing to want to make complaints rather than resolve any issues   states patient has long standing history of manipulative behaviors  Confirmed with patient that a daily schedule (when is lunch, when are medications, when is shower time) should be established with patient and adhered to for boundary setting       Jennifer Harrison RN  08/29/21 2050

## 2021-08-30 NOTE — EMTALA/ACUTE CARE TRANSFER
[address] [place] [address]  Dept: [phone]      EMTALA TRANSFER CONSENT    NAME Charity Mata                                         1970                              MRN [id number]    I have been informed of my rights regarding examination, treatment, and transfer   by Dr Jie Devi MD    Benefits: Specialized equipment and/or services available at the receiving facility (Include comment)________________________    Risks: Potential for delay in receiving treatment      Transfer Request   I acknowledge that my medical condition has been evaluated and explained to me by the emergency department physician or other qualified medical person and/or my attending physician who has recommended and offered to me further medical examination and treatment  I understand the Hospital's obligation with respect to the treatment and stabilization of my emergency medical condition  I nevertheless request to be transferred  I release the Hospital, the doctor, and any other persons caring for me from all responsibility or liability for any injury or ill effects that may result from my transfer and agree to accept all responsibility for the consequences of my choice to transfer, rather than receive stabilizing treatment at the Hospital  I understand that because the transfer is my request, my insurance may not provide reimbursement for the services  The Hospital will assist and direct me and my family in how to make arrangements for transfer, but the hospital is not liable for any fees charged by the transport service  In spite of this understanding, I refuse to consent to further medical examination and treatment which has been offered to me, and request transfer to [address] [facility] [place]   I authorize the performance of emergency medical procedures and treatments upon me in both transit and upon arrival at the receiving facility  Additionally, I authorize the release of any and all medical records to the receiving facility and request they be transported with me, if possible  I authorize the performance of emergency medical procedures and treatments upon me in both transit and upon arrival at the receiving facility  Additionally, I authorize the release of any and all medical records to the receiving facility and request they be transported with me, if possible  I understand that the safest mode of transportation during a medical emergency is an ambulance and that the Hospital advocates the use of this mode of transport  Risks of traveling to the receiving facility by car, including absence of medical control, life sustaining equipment, such as oxygen, and medical personnel has been explained to me and I fully understand them  (LONI CORRECT BOX BELOW)  [ x ]  I consent to the stated transfer and to be transported by ambulance/helicopter  [  ]  I consent to the stated transfer, but refuse transportation by ambulance and accept full responsibility for my transportation by car  I understand the risks of non-ambulance transfers and I exonerate the Hospital and its staff from any deterioration in my condition that results from this refusal     X___________________________________________    DATE  21  TIME________  Signature of patient or legally responsible individual signing on patient behalf           RELATIONSHIP TO PATIENT_________________________          Provider Certification    NAME Rito Apgar                                        [facility] 1970                              MRN [id number]    A medical screening exam was performed on the above named patient  Based on the examination:    Condition Necessitating Transfer The primary encounter diagnosis was Depression  A diagnosis of Suicidal ideations was also pertinent to this visit      Patient Condition: The patient has been stabilized such that within reasonable medical probability, no material deterioration of the patient condition or the condition of the unborn child(jessica) is likely to result from the transfer    Reason for Transfer: Level of Care needed not available at this facility    Transfer Requirements: [address] [place] [address]   · Space available and qualified personnel available for treatment as acknowledged by pacs  · Agreed to accept transfer and to provide appropriate medical treatment as acknowledged by       dr Tisha Galan  · Appropriate medical records of the examination and treatment of the patient are provided at the time of transfer   [address] [facility], [address] _______  · Transfer will be performed by qualified personnel from    and appropriate transfer equipment as required, including the use of necessary and appropriate life support measures  Provider Certification: I have examined the patient and explained the following risks and benefits of being transferred/refusing transfer to the patient/family:  General risk, such as traffic hazards, adverse weather conditions, rough terrain or turbulence, possible failure of equipment (including vehicle or aircraft), or consequences of actions of persons outside the control of the transport personnel      Based on these reasonable risks and benefits to the patient and/or the unborn child(jessica), and based upon the information available at the time of the patients examination, I certify that the medical benefits reasonably to be expected from the provision of appropriate medical treatments at another medical facility outweigh the increasing risks, if any, to the individuals medical condition, and in the case of labor to the unborn child, from effecting the transfer      X____________________________________________ DATE 08/30/21        TIME_______      ORIGINAL - SEND TO MEDICAL RECORDS   COPY - SEND WITH PATIENT DURING TRANSFER

## 2021-08-30 NOTE — ED NOTES
Pt sleeping on stretcher with HOB elevated, CPAP in place, in negative distress  1:1 constant/close observation continued by ED [facility] ([facility])  Will continue to monitor for safety       Ning Haro RN  08/30/21 7055

## 2021-08-30 NOTE — ED NOTES
1:1 constant/close observation continued by [employer]  Pt continues to sleep with CPAP in place with negative complications  Will continue to monitor for safety       Joe Melara RN  08/30/21 5523

## 2021-08-30 NOTE — ED NOTES
[email or web address] to [facility]  Jesse Ba at [facility] notified of ETA  Nurse report is to be called to [phone] prior to patient transfer

## 2021-08-30 NOTE — ED NOTES
Patient attempted to hang herself in the bathroom with a pair of socks she tied together  Two techs went in to bathroom to stop her and security called  Patient's room was cleared of all pillow cases and bed sheets       Petey Da Silva  08/29/21 2013

## 2021-08-30 NOTE — ED NOTES
Pt now awake, calm and cooperative   New patient ID bracelet applied to right wrist  VS  Will continue to monitor for safety     Dana Vera RN  08/30/21 0705

## 2021-08-30 NOTE — ED NOTES
Taking over pt 1:1 observation at this time  Pt laying on stretcher awake, both rails up  No sign of distress at this time  Will continue to monitor pt        Jennifer Mota  08/29/21 1410

## 2021-08-30 NOTE — ED NOTES
Insurance Authorization for admission:   Phone call placed to [facility]  Phone number: [phone]  Spoke to [employer] [place]  COB completed  Level of care: inpatient mental health 201  Accepting facility to call to verify arrival     COB details were relayed to [facility] at [facility]  She understands that we are awaiting transportation recommendations and the next shift may need to follow-up

## 2021-08-30 NOTE — ED NOTES
Bed search was continued overnight:    [facility]  [facility]- female bed is no longer available  [facility]; follow up in the morning for additional discharges  [facility]- no beds; follow up in the afternoon for additional discharges  [address]- no beds  [facility]  [facility]  [facility]- no beds; call back at 10AM for additional discharges

## 2021-08-31 NOTE — ED NOTES
Patient agitated in room yelling, threw cup of water across the room  Security called  Crisis and Dr Krissy Cheung at bedside        Margarita Rivero RN  08/30/21 3144

## 2021-08-31 NOTE — ED NOTES
Pharmacy contacted to send up patient's medication being held in pharmacy so it can go with her when she is transferred to [facility]       Danny Valenzuela RN  08/30/21 2621

## 2021-08-31 NOTE — ED CARE HANDOFF
Emergency Department Sign Out Note        Sign out and transfer of care from Dr Kim Murdock  See Separate Emergency Department note  The patient, Charity Mata, was evaluated by the previous provider for suicidality  Workup Completed:      ED Course / Workup Pending (followup):                                    ED Course as of Aug 30 2140   Mon Aug 30, 2021   2135 Patient became agitated about not getting dinner  She threw her water cross the room, through check and started punching a wall  She was assisted back to bed by security and given 10 mg Zyprexa IM  Now calm  Procedures  MDM    Disposition  Final diagnoses:   Depression   Suicidal ideations     Time reflects when diagnosis was documented in both MDM as applicable and the Disposition within this note     Time User Action Codes Description Comment    8/28/2021 11:59 PM Leeedie De Santiago Add [F32 9] Depression     8/28/2021 11:59 PM Rosa De Santiago Add [U78 652] Suicidal ideations       ED Disposition     ED Disposition Condition Date/Time Comment    Transfer to [facility] Aug 30, 2021  5:09 PM Charity Mata should be transferred out [facility] анна Galan and has been medically cleared           MD Documentation      Most Recent Value   Patient Condition  The patient has been stabilized such that within reasonable medical probability, no material deterioration of the patient condition or the condition of the unborn child(jessica) is likely to result from the transfer   Reason for Transfer  Level of Care needed not available at this facility   Benefits of Transfer  Specialized equipment and/or services available at the receiving facility (Include comment)________________________   Risks of Transfer  Potential for delay in receiving treatment   Accepting Physician  dr Muñiz Pi Name, [facility] анна     (Name & Tel number)  pacs   Sending MD  Dr Adalberto Larson,    Provider Certification  General risk, such as traffic hazards, adverse weather conditions, rough terrain or turbulence, possible failure of equipment (including vehicle or aircraft), or consequences of actions of persons outside the control of the transport personnel      RN Documentation      [facility] Name, [facility]     (Name & Tel number)  pacs      Follow-up Information    None       Patient's Medications   Discharge Prescriptions    No medications on file     No discharge procedures on file         ED Provider  Electronically Signed by     Mulugeta Hemphill MD  08/30/21 9960

## 2021-09-03 ENCOUNTER — TELEPHONE (OUTPATIENT)
Dept: NEUROLOGY | Facility: CLINIC | Age: 51
End: 2021-09-03

## 2021-09-03 NOTE — TELEPHONE ENCOUNTER
Left message for patient that we had to reschedule her appointment for 9/9/21 to 10/7/21 to be an inperson visit  Rescheduled to 10/7/21 at 2:40  Asked patient to call back if she needs to change it

## 2021-09-08 NOTE — PROGRESS NOTES
Pt called re: pt admitted to ED due to suicidal ideation  Pt reported she will transferred to longer term psychiatric facility, [facility], in [place], [place] for continued care  Pt placed on 30 day hold at this time, with hold expiring on 10/8/21 after which time, pt will req new script to return to OP OT

## 2021-09-10 NOTE — TELEPHONE ENCOUNTER
Patient is scheduled Monday 09/13/21 at 03:30pm  S: Lowry discharged to home with parents    B: Baby girl, born Vaginal, formula feeding.     A: Stable , tolerating formula feedings well. 24 hour bili level is 8.1, follow up level ordered for tomorrow with results to be called to Dr. Srivastava.    R: Discharge home with mother, she states understanding of  discharge instruction and agrees to follow up tomorrow for bili level and on Monday for well child check with Dr. Srivastava.    Nursing Discharge Checklist:  Hearing Screening done: Yes  Pulse Ox Screening: Yes  Car Seat test for patients <5.5# or <37 weeks: NO  ID bands compared and matched with parents: YES   screening: YES  Umbilical Tox Screening ordered and in process: N/A

## 2021-09-10 NOTE — TELEPHONE ENCOUNTER
Called patient and I left a message to call back to reschedule Botox  I did let her know that I have Monday 09/13/21 at 03:15pm on hold for her  I also let her know I will give her a call back later today

## 2021-09-13 ENCOUNTER — PROCEDURE VISIT (OUTPATIENT)
Dept: NEUROLOGY | Facility: CLINIC | Age: 51
End: 2021-09-13
Payer: MEDICARE

## 2021-09-13 VITALS — TEMPERATURE: 97.3 F | HEART RATE: 82 BPM | DIASTOLIC BLOOD PRESSURE: 72 MMHG | SYSTOLIC BLOOD PRESSURE: 137 MMHG

## 2021-09-13 DIAGNOSIS — G43.709 CHRONIC MIGRAINE WITHOUT AURA WITHOUT STATUS MIGRAINOSUS, NOT INTRACTABLE: Primary | ICD-10-CM

## 2021-09-13 PROCEDURE — 64615 CHEMODENERV MUSC MIGRAINE: CPT | Performed by: PHYSICIAN ASSISTANT

## 2021-09-13 NOTE — PROGRESS NOTES

## 2021-09-17 ENCOUNTER — OFFICE VISIT (OUTPATIENT)
Dept: FAMILY MEDICINE CLINIC | Facility: CLINIC | Age: 51
End: 2021-09-17
Payer: MEDICARE

## 2021-09-17 VITALS
OXYGEN SATURATION: 96 % | TEMPERATURE: 98.8 F | BODY MASS INDEX: 45.1 KG/M2 | WEIGHT: 271 LBS | DIASTOLIC BLOOD PRESSURE: 90 MMHG | HEART RATE: 103 BPM | SYSTOLIC BLOOD PRESSURE: 150 MMHG | RESPIRATION RATE: 20 BRPM

## 2021-09-17 DIAGNOSIS — N94.9 GYNECOLOGIC DISORDER: ICD-10-CM

## 2021-09-17 DIAGNOSIS — E03.9 HYPOTHYROIDISM, UNSPECIFIED TYPE: Primary | ICD-10-CM

## 2021-09-17 PROCEDURE — 99214 OFFICE O/P EST MOD 30 MIN: CPT | Performed by: NURSE PRACTITIONER

## 2021-09-17 RX ORDER — OXCARBAZEPINE 300 MG/1
TABLET, FILM COATED ORAL
COMMUNITY
Start: 2021-09-09 | End: 2021-12-23 | Stop reason: HOSPADM

## 2021-09-17 RX ORDER — ESTRADIOL 1 MG/1
1 TABLET ORAL DAILY
Qty: 30 TABLET | Refills: 3 | Status: SHIPPED | OUTPATIENT
Start: 2021-09-17 | End: 2022-01-18 | Stop reason: SDUPTHER

## 2021-09-17 NOTE — PROGRESS NOTES
Assessment/Plan:         Diagnoses and all orders for this visit:    Hypothyroidism, unspecified type  -     TSH, 3rd generation with Free T4 reflex; Future    Gynecologic disorder  -     estradiol (ESTRACE) 1 mg tablet; Take 1 tablet (1 mg total) by mouth daily  -     Ambulatory referral to Obstetrics / Gynecology; Future    Other orders  -     OXcarbazepine (TRILEPTAL) 300 mg tablet          Subjective:      Patient ID: Sima Becerril is a 53 y o  female  HPI  Here for follow up from admission for mental health issues    Having issues with shaking and can't sleep since on the trileptal   Trying to contact psychiatry and haven't called back    They called her Monday but she was at another [facility]   trying to help her        The following portions of the patient's history were reviewed and updated as appropriate: allergies, current medications, past family history, past medical history, past social history, past surgical history and problem list     Review of Systems   Constitutional: Negative for activity change, appetite change, chills, fatigue and fever  HENT: Negative for congestion, sinus pressure and sinus pain  Respiratory: Negative for cough  Gastrointestinal: Negative for constipation, diarrhea and nausea  Genitourinary: Negative for frequency  Neurological: Negative for dizziness, weakness, light-headedness, numbness and headaches  Psychiatric/Behavioral: Positive for agitation  The patient is nervous/anxious  Objective:  Vitals:    09/17/21 1420   BP: 150/90   BP Location: Left arm   Patient Position: Sitting   Cuff Size: Large   Pulse: 103   Resp: 20   Temp: 98 8 °F (37 1 °C)   TempSrc: Temporal   SpO2: 96%   Weight: 123 kg (271 lb)      Physical Exam  Vitals reviewed  Constitutional:       Appearance: Normal appearance  Eyes:      Conjunctiva/sclera: Conjunctivae normal    Cardiovascular:      Rate and Rhythm: Normal rate and regular rhythm        Pulses: Normal pulses  Heart sounds: Normal heart sounds  Pulmonary:      Effort: Pulmonary effort is normal       Breath sounds: Normal breath sounds  Musculoskeletal:      Cervical back: Normal range of motion  Skin:     General: Skin is warm  Neurological:      Mental Status: She is alert and oriented to person, place, and time  Psychiatric:         Attention and Perception: Attention normal          Mood and Affect: Mood is anxious and depressed  Affect is angry  Speech: Speech normal          Behavior: Behavior is agitated  Behavior is cooperative  Thought Content:  Thought content normal          Cognition and Memory: Cognition normal          Judgment: Judgment normal

## 2021-09-20 ENCOUNTER — OFFICE VISIT (OUTPATIENT)
Dept: FAMILY MEDICINE CLINIC | Facility: CLINIC | Age: 51
End: 2021-09-20
Payer: MEDICARE

## 2021-09-20 VITALS
TEMPERATURE: 97.4 F | HEART RATE: 74 BPM | WEIGHT: 277.4 LBS | SYSTOLIC BLOOD PRESSURE: 120 MMHG | OXYGEN SATURATION: 97 % | RESPIRATION RATE: 18 BRPM | BODY MASS INDEX: 46.16 KG/M2 | DIASTOLIC BLOOD PRESSURE: 70 MMHG

## 2021-09-20 DIAGNOSIS — H01.112 ALLERGIC DERMATITIS OF UPPER AND LOWER LIDS OF BOTH EYES: Primary | ICD-10-CM

## 2021-09-20 DIAGNOSIS — H01.111 ALLERGIC DERMATITIS OF UPPER AND LOWER LIDS OF BOTH EYES: Primary | ICD-10-CM

## 2021-09-20 DIAGNOSIS — Z23 IMMUNIZATION DUE: ICD-10-CM

## 2021-09-20 DIAGNOSIS — H01.115 ALLERGIC DERMATITIS OF UPPER AND LOWER LIDS OF BOTH EYES: Primary | ICD-10-CM

## 2021-09-20 DIAGNOSIS — H01.114 ALLERGIC DERMATITIS OF UPPER AND LOWER LIDS OF BOTH EYES: Primary | ICD-10-CM

## 2021-09-20 PROCEDURE — 90682 RIV4 VACC RECOMBINANT DNA IM: CPT

## 2021-09-20 PROCEDURE — G0008 ADMIN INFLUENZA VIRUS VAC: HCPCS

## 2021-09-20 PROCEDURE — 99214 OFFICE O/P EST MOD 30 MIN: CPT | Performed by: NURSE PRACTITIONER

## 2021-09-20 RX ORDER — PREDNISONE 20 MG/1
40 TABLET ORAL DAILY
Qty: 10 TABLET | Refills: 0 | Status: SHIPPED | OUTPATIENT
Start: 2021-09-20 | End: 2021-09-25

## 2021-09-20 NOTE — LETTER
September 20, 2021     Patient: Lilly Mendoza   YOB: 1970   Date of Visit: 9/20/2021       To Whom it May Concern:    Sandra Pittman is under my professional care  She was seen in my office on 9/20/2021  Please excuse Whitney Roberson from work for 2 days 9/20/21 and 9/21/21  If you have any questions or concerns, please don't hesitate to call           Sincerely,          JHONATAN [facility]

## 2021-09-20 NOTE — PROGRESS NOTES
Subjective:   Chief Complaint   Patient presents with    Itchy Eye     red around outside of eye  vision blurry  some exudate  since  saturday        Patient ID: Nilson Hanley is a 53 y o  female  Patient presents in the office today with bilateral red and itchy eyes which started Saturday  States itching and redness is primarily on her upper and lower eyelids with minimal drainage  Patient has been applying Vaseline without relief and has not been using any new detergents, lotions, creams, etc  Patient states the last time this happened a year ago "whatever you gave me helped " Denies any visual issues  The following portions of the patient's history were reviewed and updated as appropriate: allergies, current medications, past family history, past medical history, past social history, past surgical history and problem list     Review of Systems   Constitutional: Negative for chills, fatigue and fever  Eyes: Positive for itching (bilateral upper and lower lids)  Negative for pain and visual disturbance  Respiratory: Negative for cough and shortness of breath  Cardiovascular: Negative for chest pain and palpitations  Psychiatric/Behavioral: Negative for dysphoric mood (being treated)  The patient is not nervous/anxious (being treated)  Objective:  Vitals:    09/20/21 1323   BP: 120/70   BP Location: Left arm   Patient Position: Sitting   Cuff Size: Large   Pulse: 74   Resp: 18   Temp: (!) 97 4 °F (36 3 °C)   TempSrc: Temporal   SpO2: 97%   Weight: 126 kg (277 lb 6 4 oz)      Physical Exam  Vitals reviewed  Constitutional:       Appearance: Normal appearance  She is obese  Eyes:      General: Vision grossly intact  Gaze aligned appropriately  Conjunctiva/sclera: Conjunctivae normal       Right eye: Right conjunctiva is not injected  No exudate  Left eye: Left conjunctiva is not injected  No exudate       Comments: Bilateral upper and lower lids with erythema and swelling   Cardiovascular:      Rate and Rhythm: Normal rate and regular rhythm  Pulses: Normal pulses  Heart sounds: Normal heart sounds  Pulmonary:      Effort: Pulmonary effort is normal       Breath sounds: Normal breath sounds  Skin:     General: Skin is warm and dry  Capillary Refill: Capillary refill takes less than 2 seconds  Comments: See media pictures   Neurological:      Mental Status: She is alert and oriented to person, place, and time  Psychiatric:         Mood and Affect: Mood normal          Behavior: Behavior normal            Assessment/Plan:    No problem-specific Assessment & Plan notes found for this encounter  Diagnoses and all orders for this visit:    Allergic dermatitis of upper and lower lids of both eyes  Comments:  recurrent  Orders:  -     predniSONE 20 mg tablet; Take 2 tablets (40 mg total) by mouth daily for 5 days  -     hydrocortisone 2 5 % ointment; Apply topically 2 (two) times a day  -     Cancel: Ambulatory referral to Dermatology; Future  -     Ambulatory referral to Dermatology;  Future    Immunization due  -     influenza vaccine, quadrivalent, recombinant, PF, 0 5 mL, for patients 18 yr+ (FLUBLOK)

## 2021-09-27 ENCOUNTER — APPOINTMENT (OUTPATIENT)
Dept: LAB | Facility: CLINIC | Age: 51
End: 2021-09-27
Payer: MEDICARE

## 2021-09-27 ENCOUNTER — OFFICE VISIT (OUTPATIENT)
Dept: FAMILY MEDICINE CLINIC | Facility: CLINIC | Age: 51
End: 2021-09-27
Payer: MEDICARE

## 2021-09-27 VITALS
DIASTOLIC BLOOD PRESSURE: 72 MMHG | RESPIRATION RATE: 16 BRPM | WEIGHT: 283.4 LBS | OXYGEN SATURATION: 98 % | HEART RATE: 72 BPM | TEMPERATURE: 96.6 F | SYSTOLIC BLOOD PRESSURE: 106 MMHG | BODY MASS INDEX: 47.16 KG/M2

## 2021-09-27 DIAGNOSIS — E03.9 HYPOTHYROIDISM, UNSPECIFIED TYPE: ICD-10-CM

## 2021-09-27 DIAGNOSIS — R30.0 DYSURIA: Primary | ICD-10-CM

## 2021-09-27 LAB
SL AMB  POCT GLUCOSE, UA: NEGATIVE
SL AMB LEUKOCYTE ESTERASE,UA: NEGATIVE
SL AMB POCT BILIRUBIN,UA: NEGATIVE
SL AMB POCT BLOOD,UA: NEGATIVE
SL AMB POCT CLARITY,UA: CLEAR
SL AMB POCT COLOR,UA: YELLOW
SL AMB POCT KETONES,UA: 5
SL AMB POCT NITRITE,UA: NEGATIVE
SL AMB POCT PH,UA: 6.5
SL AMB POCT SPECIFIC GRAVITY,UA: 1.01
SL AMB POCT URINE PROTEIN: 15
SL AMB POCT UROBILINOGEN: NEGATIVE
T4 FREE SERPL-MCNC: 0.94 NG/DL (ref 0.76–1.46)
TSH SERPL DL<=0.05 MIU/L-ACNC: 8.27 UIU/ML (ref 0.36–3.74)

## 2021-09-27 PROCEDURE — 84439 ASSAY OF FREE THYROXINE: CPT

## 2021-09-27 PROCEDURE — 36415 COLL VENOUS BLD VENIPUNCTURE: CPT

## 2021-09-27 PROCEDURE — 81002 URINALYSIS NONAUTO W/O SCOPE: CPT | Performed by: NURSE PRACTITIONER

## 2021-09-27 PROCEDURE — 84443 ASSAY THYROID STIM HORMONE: CPT

## 2021-09-27 PROCEDURE — 99213 OFFICE O/P EST LOW 20 MIN: CPT | Performed by: NURSE PRACTITIONER

## 2021-09-27 NOTE — PROGRESS NOTES
Subjective:   Chief Complaint   Patient presents with    Urinary Tract Infection        Patient ID: Cristiana Huerta is a 53 y o  female  Patient presents with complaints of a urinary tract infection  Patient states it started fully on Saturday  A few days before she could feel something coming on and gradually got worse  She was having increased frequency, urgency, and burning sensation on urination on every urination  Negative for flank or abdominal pain  She has been drinking fluids more frequently since Saturday Sunday symptoms were off and on  Today, burning sensation and frequency while urinating has subsided  Urine dip was negative for leukocytes, nitrites, and blood  Educated to increase fluids and water (60ounces throughout the day), taking cranberry tablets especially at bedtime  Difficulty Urinating   This is a new problem  The current episode started in the past 7 days  The problem occurs every urination  The problem has been gradually worsening  The quality of the pain is described as aching and burning  The pain is at a severity of 6/10  There has been no fever  She is not sexually active  There is a history of pyelonephritis  Associated symptoms include frequency (resolved), nausea and urgency (resolved)  Pertinent negatives include no chills, discharge, hematuria, possible pregnancy or vomiting  She has tried nothing for the symptoms  The following portions of the patient's history were reviewed and updated as appropriate: allergies, current medications, past family history, past medical history, past social history, past surgical history and problem list     Review of Systems   Constitutional: Negative for chills, fatigue and fever  Respiratory: Negative for cough and shortness of breath  Gastrointestinal: Positive for nausea  Negative for vomiting  Genitourinary: Positive for dysuria (resolved), frequency (resolved) and urgency (resolved)  Negative for hematuria  Psychiatric/Behavioral: Negative for dysphoric mood (being treated)  The patient is not nervous/anxious (being treated)  Objective:  Vitals:    09/27/21 0929   BP: 106/72   BP Location: Left arm   Patient Position: Sitting   Cuff Size: Large   Pulse: 72   Resp: 16   Temp: (!) 96 6 °F (35 9 °C)   TempSrc: Tympanic   SpO2: 98%   Weight: 129 kg (283 lb 6 4 oz)      Physical Exam  Vitals reviewed  Constitutional:       Appearance: Normal appearance  She is obese  Cardiovascular:      Rate and Rhythm: Normal rate and regular rhythm  Pulses: Normal pulses  Heart sounds: Normal heart sounds  Pulmonary:      Effort: Pulmonary effort is normal       Breath sounds: Normal breath sounds  Abdominal:      Tenderness: There is no abdominal tenderness  There is no right CVA tenderness or left CVA tenderness  Skin:     General: Skin is warm and dry  Capillary Refill: Capillary refill takes less than 2 seconds  Neurological:      Mental Status: She is alert and oriented to person, place, and time  Psychiatric:         Mood and Affect: Mood normal          Behavior: Behavior normal            Assessment/Plan:    No problem-specific Assessment & Plan notes found for this encounter         Diagnoses and all orders for this visit:    Dysuria  Comments:  Resolved  Orders:  -     POCT urine dip

## 2021-09-28 DIAGNOSIS — E03.9 HYPOTHYROIDISM, UNSPECIFIED TYPE: Primary | ICD-10-CM

## 2021-09-28 RX ORDER — LEVOTHYROXINE SODIUM 0.07 MG/1
75 TABLET ORAL DAILY
Qty: 90 TABLET | Refills: 1 | Status: SHIPPED | OUTPATIENT
Start: 2021-09-28 | End: 2022-01-10 | Stop reason: HOSPADM

## 2021-10-07 ENCOUNTER — OFFICE VISIT (OUTPATIENT)
Dept: NEUROLOGY | Facility: CLINIC | Age: 51
End: 2021-10-07
Payer: MEDICARE

## 2021-10-07 VITALS
DIASTOLIC BLOOD PRESSURE: 90 MMHG | HEIGHT: 65 IN | BODY MASS INDEX: 47.15 KG/M2 | WEIGHT: 283 LBS | SYSTOLIC BLOOD PRESSURE: 150 MMHG

## 2021-10-07 DIAGNOSIS — R41.89 COGNITIVE IMPAIRMENT: Primary | ICD-10-CM

## 2021-10-07 DIAGNOSIS — Z86.16 HISTORY OF COVID-19: ICD-10-CM

## 2021-10-07 DIAGNOSIS — F32.9 MAJOR DEPRESSIVE DISORDER: ICD-10-CM

## 2021-10-07 DIAGNOSIS — F39 MOOD DISORDER (HCC): ICD-10-CM

## 2021-10-07 DIAGNOSIS — F25.0 SCHIZOAFFECTIVE DISORDER, BIPOLAR TYPE (HCC): Chronic | ICD-10-CM

## 2021-10-07 PROCEDURE — 99213 OFFICE O/P EST LOW 20 MIN: CPT

## 2021-10-07 RX ORDER — HALOPERIDOL 5 MG
5 TABLET ORAL DAILY
COMMUNITY
Start: 2021-09-30 | End: 2021-12-20

## 2021-10-13 ENCOUNTER — OFFICE VISIT (OUTPATIENT)
Dept: FAMILY MEDICINE CLINIC | Facility: CLINIC | Age: 51
End: 2021-10-13
Payer: MEDICARE

## 2021-10-13 VITALS
DIASTOLIC BLOOD PRESSURE: 72 MMHG | RESPIRATION RATE: 16 BRPM | OXYGEN SATURATION: 94 % | SYSTOLIC BLOOD PRESSURE: 130 MMHG | HEART RATE: 83 BPM | HEIGHT: 65 IN | WEIGHT: 274.4 LBS | BODY MASS INDEX: 45.72 KG/M2 | TEMPERATURE: 96.8 F

## 2021-10-13 DIAGNOSIS — R35.0 URINARY FREQUENCY: Primary | ICD-10-CM

## 2021-10-13 LAB
BILIRUB UR QL STRIP: NEGATIVE
CLARITY UR: CLEAR
COLOR UR: YELLOW
GLUCOSE UR STRIP-MCNC: NEGATIVE MG/DL
HGB UR QL STRIP.AUTO: NEGATIVE
KETONES UR STRIP-MCNC: NEGATIVE MG/DL
LEUKOCYTE ESTERASE UR QL STRIP: NEGATIVE
NITRITE UR QL STRIP: NEGATIVE
PH UR STRIP.AUTO: 6.5 [PH]
PROT UR STRIP-MCNC: NEGATIVE MG/DL
SL AMB  POCT GLUCOSE, UA: NEGATIVE
SL AMB LEUKOCYTE ESTERASE,UA: NEGATIVE
SL AMB POCT BILIRUBIN,UA: NEGATIVE
SL AMB POCT BLOOD,UA: NEGATIVE
SL AMB POCT CLARITY,UA: CLEAR
SL AMB POCT COLOR,UA: YELLOW
SL AMB POCT KETONES,UA: 5
SL AMB POCT NITRITE,UA: NEGATIVE
SL AMB POCT PH,UA: 6.5
SL AMB POCT SPECIFIC GRAVITY,UA: 1.01
SL AMB POCT URINE PROTEIN: 15
SL AMB POCT UROBILINOGEN: NEGATIVE
SP GR UR STRIP.AUTO: 1.02 (ref 1–1.03)
UROBILINOGEN UR QL STRIP.AUTO: 0.2 E.U./DL

## 2021-10-13 PROCEDURE — 81002 URINALYSIS NONAUTO W/O SCOPE: CPT | Performed by: NURSE PRACTITIONER

## 2021-10-13 PROCEDURE — 99213 OFFICE O/P EST LOW 20 MIN: CPT | Performed by: NURSE PRACTITIONER

## 2021-10-13 PROCEDURE — 81003 URINALYSIS AUTO W/O SCOPE: CPT | Performed by: NURSE PRACTITIONER

## 2021-10-13 RX ORDER — FESOTERODINE FUMARATE 8 MG/1
8 TABLET, EXTENDED RELEASE ORAL DAILY
Qty: 30 TABLET | Refills: 1 | Status: SHIPPED | OUTPATIENT
Start: 2021-10-13 | End: 2021-12-14

## 2021-10-13 RX ORDER — KETOROLAC TROMETHAMINE 5 MG/ML
SOLUTION OPHTHALMIC
COMMUNITY
Start: 2021-10-11 | End: 2021-12-23 | Stop reason: HOSPADM

## 2021-10-14 ENCOUNTER — OFFICE VISIT (OUTPATIENT)
Dept: OBGYN CLINIC | Facility: CLINIC | Age: 51
End: 2021-10-14
Payer: MEDICARE

## 2021-10-14 VITALS
BODY MASS INDEX: 44.98 KG/M2 | WEIGHT: 270 LBS | DIASTOLIC BLOOD PRESSURE: 84 MMHG | SYSTOLIC BLOOD PRESSURE: 130 MMHG | HEIGHT: 65 IN | HEART RATE: 73 BPM

## 2021-10-14 DIAGNOSIS — M77.02 MEDIAL EPICONDYLITIS OF LEFT ELBOW: Primary | ICD-10-CM

## 2021-10-14 DIAGNOSIS — G56.22 CUBITAL TUNNEL SYNDROME ON LEFT: ICD-10-CM

## 2021-10-14 PROCEDURE — 99214 OFFICE O/P EST MOD 30 MIN: CPT | Performed by: SURGERY

## 2021-10-14 PROCEDURE — 20551 NJX 1 TENDON ORIGIN/INSJ: CPT | Performed by: SURGERY

## 2021-10-14 RX ORDER — TRIAMCINOLONE ACETONIDE 40 MG/ML
40 INJECTION, SUSPENSION INTRA-ARTICULAR; INTRAMUSCULAR
Status: COMPLETED | OUTPATIENT
Start: 2021-10-14 | End: 2021-10-14

## 2021-10-14 RX ORDER — LIDOCAINE HYDROCHLORIDE 10 MG/ML
1 INJECTION, SOLUTION INFILTRATION; PERINEURAL
Status: COMPLETED | OUTPATIENT
Start: 2021-10-14 | End: 2021-10-14

## 2021-10-14 RX ADMIN — TRIAMCINOLONE ACETONIDE 40 MG: 40 INJECTION, SUSPENSION INTRA-ARTICULAR; INTRAMUSCULAR at 14:28

## 2021-10-14 RX ADMIN — LIDOCAINE HYDROCHLORIDE 1 ML: 10 INJECTION, SOLUTION INFILTRATION; PERINEURAL at 14:28

## 2021-10-26 ENCOUNTER — OFFICE VISIT (OUTPATIENT)
Dept: FAMILY MEDICINE CLINIC | Facility: CLINIC | Age: 51
End: 2021-10-26
Payer: MEDICARE

## 2021-10-26 VITALS
HEIGHT: 65 IN | TEMPERATURE: 97.8 F | RESPIRATION RATE: 16 BRPM | WEIGHT: 273.8 LBS | DIASTOLIC BLOOD PRESSURE: 72 MMHG | OXYGEN SATURATION: 97 % | HEART RATE: 75 BPM | BODY MASS INDEX: 45.62 KG/M2 | SYSTOLIC BLOOD PRESSURE: 128 MMHG

## 2021-10-26 DIAGNOSIS — G47.33 OSA (OBSTRUCTIVE SLEEP APNEA): Chronic | ICD-10-CM

## 2021-10-26 DIAGNOSIS — R35.0 URINARY FREQUENCY: Primary | ICD-10-CM

## 2021-10-26 LAB
ANION GAP SERPL CALCULATED.3IONS-SCNC: 3 MMOL/L (ref 4–13)
BUN SERPL-MCNC: 20 MG/DL (ref 5–25)
CALCIUM SERPL-MCNC: 9.1 MG/DL (ref 8.3–10.1)
CHLORIDE SERPL-SCNC: 111 MMOL/L (ref 100–108)
CO2 SERPL-SCNC: 25 MMOL/L (ref 21–32)
CREAT SERPL-MCNC: 0.75 MG/DL (ref 0.6–1.3)
GFR SERPL CREATININE-BSD FRML MDRD: 93 ML/MIN/1.73SQ M
GLUCOSE SERPL-MCNC: 73 MG/DL (ref 65–140)
POTASSIUM SERPL-SCNC: 4 MMOL/L (ref 3.5–5.3)
SODIUM SERPL-SCNC: 139 MMOL/L (ref 136–145)

## 2021-10-26 PROCEDURE — 99213 OFFICE O/P EST LOW 20 MIN: CPT | Performed by: NURSE PRACTITIONER

## 2021-10-26 PROCEDURE — 80048 BASIC METABOLIC PNL TOTAL CA: CPT | Performed by: NURSE PRACTITIONER

## 2021-10-26 PROCEDURE — 36415 COLL VENOUS BLD VENIPUNCTURE: CPT | Performed by: NURSE PRACTITIONER

## 2021-10-27 ENCOUNTER — EVALUATION (OUTPATIENT)
Dept: OCCUPATIONAL THERAPY | Facility: CLINIC | Age: 51
End: 2021-10-27
Payer: MEDICARE

## 2021-10-27 ENCOUNTER — TELEPHONE (OUTPATIENT)
Dept: FAMILY MEDICINE CLINIC | Facility: CLINIC | Age: 51
End: 2021-10-27

## 2021-10-27 DIAGNOSIS — R41.89 COGNITIVE IMPAIRMENT: Primary | ICD-10-CM

## 2021-10-27 DIAGNOSIS — Z86.16 HISTORY OF COVID-19: ICD-10-CM

## 2021-10-27 PROCEDURE — 96125 COGNITIVE TEST BY HC PRO: CPT

## 2021-10-27 PROCEDURE — 97166 OT EVAL MOD COMPLEX 45 MIN: CPT

## 2021-10-29 DIAGNOSIS — I10 HYPERTENSION, UNSPECIFIED TYPE: Primary | ICD-10-CM

## 2021-10-29 RX ORDER — AMLODIPINE BESYLATE 5 MG/1
TABLET ORAL
Qty: 30 TABLET | Refills: 4 | Status: SHIPPED | OUTPATIENT
Start: 2021-10-29 | End: 2021-12-23 | Stop reason: HOSPADM

## 2021-11-02 ENCOUNTER — APPOINTMENT (OUTPATIENT)
Dept: OCCUPATIONAL THERAPY | Facility: CLINIC | Age: 51
End: 2021-11-02
Payer: MEDICARE

## 2021-11-02 ENCOUNTER — TELEPHONE (OUTPATIENT)
Dept: NEUROLOGY | Facility: CLINIC | Age: 51
End: 2021-11-02

## 2021-11-02 ENCOUNTER — OFFICE VISIT (OUTPATIENT)
Dept: FAMILY MEDICINE CLINIC | Facility: CLINIC | Age: 51
End: 2021-11-02
Payer: MEDICARE

## 2021-11-02 VITALS
TEMPERATURE: 96.6 F | DIASTOLIC BLOOD PRESSURE: 82 MMHG | OXYGEN SATURATION: 100 % | HEART RATE: 55 BPM | RESPIRATION RATE: 18 BRPM | BODY MASS INDEX: 45.79 KG/M2 | HEIGHT: 65 IN | SYSTOLIC BLOOD PRESSURE: 128 MMHG | WEIGHT: 274.8 LBS

## 2021-11-02 DIAGNOSIS — E86.0 DEHYDRATION: ICD-10-CM

## 2021-11-02 DIAGNOSIS — R35.0 URINARY FREQUENCY: Primary | ICD-10-CM

## 2021-11-02 LAB
ANION GAP SERPL CALCULATED.3IONS-SCNC: 3 MMOL/L (ref 4–13)
BUN SERPL-MCNC: 16 MG/DL (ref 5–25)
CALCIUM SERPL-MCNC: 9.3 MG/DL (ref 8.3–10.1)
CHLORIDE SERPL-SCNC: 108 MMOL/L (ref 100–108)
CO2 SERPL-SCNC: 26 MMOL/L (ref 21–32)
CREAT SERPL-MCNC: 0.58 MG/DL (ref 0.6–1.3)
GFR SERPL CREATININE-BSD FRML MDRD: 108 ML/MIN/1.73SQ M
GLUCOSE SERPL-MCNC: 82 MG/DL (ref 65–140)
POTASSIUM SERPL-SCNC: 4 MMOL/L (ref 3.5–5.3)
SODIUM SERPL-SCNC: 137 MMOL/L (ref 136–145)

## 2021-11-02 PROCEDURE — 36415 COLL VENOUS BLD VENIPUNCTURE: CPT | Performed by: NURSE PRACTITIONER

## 2021-11-02 PROCEDURE — 80048 BASIC METABOLIC PNL TOTAL CA: CPT | Performed by: NURSE PRACTITIONER

## 2021-11-02 PROCEDURE — 99213 OFFICE O/P EST LOW 20 MIN: CPT | Performed by: NURSE PRACTITIONER

## 2021-11-02 RX ORDER — BUPROPION HYDROCHLORIDE 300 MG/1
300 TABLET ORAL DAILY
Status: ON HOLD | COMMUNITY
End: 2022-01-10 | Stop reason: SDUPTHER

## 2021-11-08 ENCOUNTER — OFFICE VISIT (OUTPATIENT)
Dept: OCCUPATIONAL THERAPY | Facility: CLINIC | Age: 51
End: 2021-11-08
Payer: MEDICARE

## 2021-11-08 DIAGNOSIS — R41.89 COGNITIVE IMPAIRMENT: Primary | ICD-10-CM

## 2021-11-08 PROCEDURE — 97150 GROUP THERAPEUTIC PROCEDURES: CPT

## 2021-11-08 PROCEDURE — 97530 THERAPEUTIC ACTIVITIES: CPT

## 2021-11-10 ENCOUNTER — APPOINTMENT (OUTPATIENT)
Dept: OCCUPATIONAL THERAPY | Facility: CLINIC | Age: 51
End: 2021-11-10
Payer: MEDICARE

## 2021-11-11 ENCOUNTER — TELEPHONE (OUTPATIENT)
Dept: FAMILY MEDICINE CLINIC | Facility: CLINIC | Age: 51
End: 2021-11-11

## 2021-11-11 ENCOUNTER — OFFICE VISIT (OUTPATIENT)
Dept: OCCUPATIONAL THERAPY | Facility: CLINIC | Age: 51
End: 2021-11-11
Payer: MEDICARE

## 2021-11-11 DIAGNOSIS — R41.89 COGNITIVE IMPAIRMENT: Primary | ICD-10-CM

## 2021-11-11 PROCEDURE — 97530 THERAPEUTIC ACTIVITIES: CPT

## 2021-11-15 ENCOUNTER — OFFICE VISIT (OUTPATIENT)
Dept: OCCUPATIONAL THERAPY | Facility: CLINIC | Age: 51
End: 2021-11-15
Payer: MEDICARE

## 2021-11-15 DIAGNOSIS — R60.9 EDEMA, UNSPECIFIED TYPE: Primary | ICD-10-CM

## 2021-11-15 DIAGNOSIS — R41.89 COGNITIVE IMPAIRMENT: Primary | ICD-10-CM

## 2021-11-15 PROCEDURE — 97530 THERAPEUTIC ACTIVITIES: CPT

## 2021-11-16 RX ORDER — FUROSEMIDE 20 MG/1
TABLET ORAL
Qty: 30 TABLET | Refills: 4 | Status: SHIPPED | OUTPATIENT
Start: 2021-11-16 | End: 2021-12-23 | Stop reason: HOSPADM

## 2021-11-17 ENCOUNTER — OFFICE VISIT (OUTPATIENT)
Dept: OCCUPATIONAL THERAPY | Facility: CLINIC | Age: 51
End: 2021-11-17
Payer: MEDICARE

## 2021-11-17 DIAGNOSIS — R41.89 COGNITIVE IMPAIRMENT: Primary | ICD-10-CM

## 2021-11-17 PROCEDURE — 97530 THERAPEUTIC ACTIVITIES: CPT

## 2021-11-23 ENCOUNTER — OFFICE VISIT (OUTPATIENT)
Dept: OCCUPATIONAL THERAPY | Facility: CLINIC | Age: 51
End: 2021-11-23
Payer: MEDICARE

## 2021-11-23 DIAGNOSIS — R41.89 COGNITIVE IMPAIRMENT: Primary | ICD-10-CM

## 2021-11-23 PROCEDURE — 97530 THERAPEUTIC ACTIVITIES: CPT

## 2021-11-24 ENCOUNTER — OFFICE VISIT (OUTPATIENT)
Dept: OCCUPATIONAL THERAPY | Facility: CLINIC | Age: 51
End: 2021-11-24
Payer: MEDICARE

## 2021-11-24 ENCOUNTER — OFFICE VISIT (OUTPATIENT)
Dept: OBGYN CLINIC | Facility: CLINIC | Age: 51
End: 2021-11-24
Payer: MEDICARE

## 2021-11-24 VITALS
WEIGHT: 274 LBS | DIASTOLIC BLOOD PRESSURE: 82 MMHG | RESPIRATION RATE: 17 BRPM | BODY MASS INDEX: 45.65 KG/M2 | HEART RATE: 79 BPM | HEIGHT: 65 IN | SYSTOLIC BLOOD PRESSURE: 124 MMHG

## 2021-11-24 DIAGNOSIS — R41.89 COGNITIVE IMPAIRMENT: Primary | ICD-10-CM

## 2021-11-24 DIAGNOSIS — M77.02 MEDIAL EPICONDYLITIS OF LEFT ELBOW: ICD-10-CM

## 2021-11-24 DIAGNOSIS — G56.22 CUBITAL TUNNEL SYNDROME ON LEFT: Primary | ICD-10-CM

## 2021-11-24 PROCEDURE — 97530 THERAPEUTIC ACTIVITIES: CPT

## 2021-11-24 PROCEDURE — 99213 OFFICE O/P EST LOW 20 MIN: CPT | Performed by: SURGERY

## 2021-11-24 RX ORDER — HALOPERIDOL DECANOATE 50 MG/ML
50 INJECTION INTRAMUSCULAR
COMMUNITY
Start: 2021-11-04 | End: 2022-07-15

## 2021-11-29 ENCOUNTER — OFFICE VISIT (OUTPATIENT)
Dept: OCCUPATIONAL THERAPY | Facility: CLINIC | Age: 51
End: 2021-11-29
Payer: MEDICARE

## 2021-11-29 DIAGNOSIS — R41.89 COGNITIVE IMPAIRMENT: Primary | ICD-10-CM

## 2021-11-29 PROCEDURE — 97535 SELF CARE MNGMENT TRAINING: CPT

## 2021-11-29 PROCEDURE — 97530 THERAPEUTIC ACTIVITIES: CPT

## 2021-12-01 ENCOUNTER — EVALUATION (OUTPATIENT)
Dept: OCCUPATIONAL THERAPY | Facility: CLINIC | Age: 51
End: 2021-12-01
Payer: MEDICARE

## 2021-12-01 DIAGNOSIS — R41.89 COGNITIVE IMPAIRMENT: Primary | ICD-10-CM

## 2021-12-01 PROCEDURE — 96125 COGNITIVE TEST BY HC PRO: CPT

## 2021-12-01 PROCEDURE — 97168 OT RE-EVAL EST PLAN CARE: CPT
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
## 2021-12-09 ENCOUNTER — OFFICE VISIT (OUTPATIENT)
Dept: FAMILY MEDICINE CLINIC | Facility: CLINIC | Age: 51
End: 2021-12-09
Payer: MEDICARE

## 2021-12-09 ENCOUNTER — TELEPHONE (OUTPATIENT)
Dept: OTHER | Facility: OTHER | Age: 51
End: 2021-12-09

## 2021-12-09 VITALS
HEIGHT: 65 IN | DIASTOLIC BLOOD PRESSURE: 80 MMHG | HEART RATE: 78 BPM | OXYGEN SATURATION: 98 % | BODY MASS INDEX: 44.28 KG/M2 | WEIGHT: 265.8 LBS | SYSTOLIC BLOOD PRESSURE: 130 MMHG | RESPIRATION RATE: 16 BRPM | TEMPERATURE: 96.6 F

## 2021-12-09 DIAGNOSIS — M51.36 DDD (DEGENERATIVE DISC DISEASE), LUMBAR: ICD-10-CM

## 2021-12-09 DIAGNOSIS — E03.9 HYPOTHYROIDISM, UNSPECIFIED TYPE: Chronic | ICD-10-CM

## 2021-12-09 DIAGNOSIS — I10 BENIGN ESSENTIAL HYPERTENSION: Chronic | ICD-10-CM

## 2021-12-09 DIAGNOSIS — M17.0 PRIMARY OSTEOARTHRITIS OF BOTH KNEES: ICD-10-CM

## 2021-12-09 DIAGNOSIS — L65.9 HAIR LOSS: Primary | ICD-10-CM

## 2021-12-09 DIAGNOSIS — F25.0 SCHIZOAFFECTIVE DISORDER, BIPOLAR TYPE (HCC): Chronic | ICD-10-CM

## 2021-12-09 PROCEDURE — 99214 OFFICE O/P EST MOD 30 MIN: CPT | Performed by: NURSE PRACTITIONER

## 2021-12-09 RX ORDER — LANOLIN ALCOHOL/MO/W.PET/CERES
CREAM (GRAM) TOPICAL
COMMUNITY
Start: 2021-11-10 | End: 2022-01-10 | Stop reason: HOSPADM

## 2021-12-13 ENCOUNTER — IMMUNIZATIONS (OUTPATIENT)
Dept: FAMILY MEDICINE CLINIC | Facility: HOSPITAL | Age: 51
End: 2021-12-13

## 2021-12-13 DIAGNOSIS — Z23 ENCOUNTER FOR IMMUNIZATION: Primary | ICD-10-CM

## 2021-12-13 PROCEDURE — 0001A COVID-19 PFIZER VACC 0.3 ML: CPT

## 2021-12-13 PROCEDURE — 91300 COVID-19 PFIZER VACC 0.3 ML: CPT

## 2021-12-15 ENCOUNTER — TELEPHONE (OUTPATIENT)
Dept: NEUROLOGY | Facility: CLINIC | Age: 51
End: 2021-12-15

## 2021-12-15 ENCOUNTER — HOSPITAL ENCOUNTER (EMERGENCY)
Facility: HOSPITAL | Age: 51
Discharge: HOME | End: 2021-12-15
Attending: EMERGENCY MEDICINE
Payer: MEDICARE

## 2021-12-15 ENCOUNTER — PROCEDURE VISIT (OUTPATIENT)
Dept: NEUROLOGY | Facility: CLINIC | Age: 51
End: 2021-12-15
Payer: MEDICARE

## 2021-12-15 VITALS — TEMPERATURE: 97.2 F | DIASTOLIC BLOOD PRESSURE: 80 MMHG | HEART RATE: 70 BPM | SYSTOLIC BLOOD PRESSURE: 143 MMHG

## 2021-12-15 VITALS
RESPIRATION RATE: 16 BRPM | HEART RATE: 80 BPM | TEMPERATURE: 98.7 F | DIASTOLIC BLOOD PRESSURE: 90 MMHG | OXYGEN SATURATION: 100 % | SYSTOLIC BLOOD PRESSURE: 164 MMHG

## 2021-12-15 DIAGNOSIS — F31.9 BIPOLAR DISORDER (HCC): ICD-10-CM

## 2021-12-15 DIAGNOSIS — F32.A DEPRESSION: Primary | ICD-10-CM

## 2021-12-15 DIAGNOSIS — G43.709 CHRONIC MIGRAINE WITHOUT AURA WITHOUT STATUS MIGRAINOSUS, NOT INTRACTABLE: Primary | ICD-10-CM

## 2021-12-15 PROCEDURE — 99285 EMERGENCY DEPT VISIT HI MDM: CPT | Performed by: EMERGENCY MEDICINE

## 2021-12-15 PROCEDURE — 99284 EMERGENCY DEPT VISIT MOD MDM: CPT

## 2021-12-15 PROCEDURE — 64615 CHEMODENERV MUSC MIGRAINE: CPT | Performed by: PHYSICIAN ASSISTANT

## 2021-12-21 ENCOUNTER — APPOINTMENT (EMERGENCY)
Dept: RADIOLOGY | Facility: HOSPITAL | Age: 51
End: 2021-12-21
Payer: MEDICARE

## 2021-12-21 ENCOUNTER — HOSPITAL ENCOUNTER (EMERGENCY)
Facility: HOSPITAL | Age: 51
End: 2021-12-21
Attending: EMERGENCY MEDICINE | Admitting: EMERGENCY MEDICINE
Payer: MEDICARE

## 2021-12-21 ENCOUNTER — HOSPITAL ENCOUNTER (EMERGENCY)
Facility: HOSPITAL | Age: 51
Discharge: HOME | End: 2021-12-21
Attending: EMERGENCY MEDICINE
Payer: MEDICARE

## 2021-12-21 ENCOUNTER — HOSPITAL ENCOUNTER (INPATIENT)
Facility: HOSPITAL | Age: 51
LOS: 2 days | Discharge: HOME | DRG: 885 | End: 2021-12-23
Attending: STUDENT IN AN ORGANIZED HEALTH CARE EDUCATION/TRAINING PROGRAM | Admitting: PSYCHIATRY & NEUROLOGY
Payer: MEDICARE

## 2021-12-21 VITALS
TEMPERATURE: 98.3 F | HEART RATE: 88 BPM | SYSTOLIC BLOOD PRESSURE: 164 MMHG | RESPIRATION RATE: 16 BRPM | OXYGEN SATURATION: 95 % | HEIGHT: 65 IN | BODY MASS INDEX: 44.15 KG/M2 | WEIGHT: 265 LBS | DIASTOLIC BLOOD PRESSURE: 104 MMHG

## 2021-12-21 VITALS
RESPIRATION RATE: 18 BRPM | SYSTOLIC BLOOD PRESSURE: 122 MMHG | OXYGEN SATURATION: 96 % | DIASTOLIC BLOOD PRESSURE: 76 MMHG | HEART RATE: 81 BPM | TEMPERATURE: 98.9 F

## 2021-12-21 DIAGNOSIS — N39.0 URINARY TRACT INFECTION: ICD-10-CM

## 2021-12-21 DIAGNOSIS — F19.10 SUBSTANCE ABUSE (HCC): ICD-10-CM

## 2021-12-21 DIAGNOSIS — F25.0 SCHIZOAFFECTIVE DISORDER, BIPOLAR TYPE (HCC): Primary | Chronic | ICD-10-CM

## 2021-12-21 DIAGNOSIS — S50.00XA CONTUSION OF ELBOW: Primary | ICD-10-CM

## 2021-12-21 DIAGNOSIS — R45.851 DEPRESSION WITH SUICIDAL IDEATION: ICD-10-CM

## 2021-12-21 DIAGNOSIS — F32.A DEPRESSION WITH SUICIDAL IDEATION: ICD-10-CM

## 2021-12-21 DIAGNOSIS — R41.89 COGNITIVE IMPAIRMENT: Primary | ICD-10-CM

## 2021-12-21 DIAGNOSIS — R41.89 COGNITIVE IMPAIRMENT: ICD-10-CM

## 2021-12-21 LAB
ALBUMIN SERPL BCP-MCNC: 3.7 G/DL (ref 3.5–5)
ALP SERPL-CCNC: 91 U/L (ref 46–116)
ALT SERPL W P-5'-P-CCNC: 60 U/L (ref 12–78)
AMORPH URATE CRY URNS QL MICRO: ABNORMAL /HPF
AMPHETAMINES SERPL QL SCN: POSITIVE
ANION GAP SERPL CALCULATED.3IONS-SCNC: 11 MMOL/L (ref 4–13)
AST SERPL W P-5'-P-CCNC: 37 U/L (ref 5–45)
BACTERIA UR QL AUTO: ABNORMAL /HPF
BARBITURATES UR QL: NEGATIVE
BASOPHILS # BLD AUTO: 0.04 THOUSANDS/ΜL (ref 0–0.1)
BASOPHILS NFR BLD AUTO: 0 % (ref 0–1)
BENZODIAZ UR QL: NEGATIVE
BILIRUB SERPL-MCNC: 0.39 MG/DL (ref 0.2–1)
BILIRUB UR QL STRIP: ABNORMAL
BUN SERPL-MCNC: 12 MG/DL (ref 5–25)
CALCIUM SERPL-MCNC: 9.5 MG/DL (ref 8.3–10.1)
CHLORIDE SERPL-SCNC: 105 MMOL/L (ref 100–108)
CLARITY UR: ABNORMAL
CO2 SERPL-SCNC: 27 MMOL/L (ref 21–32)
COARSE GRAN CASTS URNS QL MICRO: ABNORMAL /LPF
COCAINE UR QL: NEGATIVE
COLOR UR: ABNORMAL
CREAT SERPL-MCNC: 0.85 MG/DL (ref 0.6–1.3)
EOSINOPHIL # BLD AUTO: 0.03 THOUSAND/ΜL (ref 0–0.61)
EOSINOPHIL NFR BLD AUTO: 0 % (ref 0–6)
ERYTHROCYTE [DISTWIDTH] IN BLOOD BY AUTOMATED COUNT: 12.5 % (ref 11.6–15.1)
ETHANOL EXG-MCNC: 0 MG/DL
FLUAV RNA RESP QL NAA+PROBE: NEGATIVE
FLUBV RNA RESP QL NAA+PROBE: NEGATIVE
GFR SERPL CREATININE-BSD FRML MDRD: 80 ML/MIN/1.73SQ M
GLUCOSE SERPL-MCNC: 113 MG/DL (ref 65–140)
GLUCOSE UR STRIP-MCNC: NEGATIVE MG/DL
HCT VFR BLD AUTO: 46.3 % (ref 34.8–46.1)
HGB BLD-MCNC: 15.5 G/DL (ref 11.5–15.4)
HGB UR QL STRIP.AUTO: ABNORMAL
HYALINE CASTS #/AREA URNS LPF: ABNORMAL /LPF
IMM GRANULOCYTES # BLD AUTO: 0.09 THOUSAND/UL (ref 0–0.2)
IMM GRANULOCYTES NFR BLD AUTO: 1 % (ref 0–2)
KETONES UR STRIP-MCNC: ABNORMAL MG/DL
LEUKOCYTE ESTERASE UR QL STRIP: NEGATIVE
LYMPHOCYTES # BLD AUTO: 2.41 THOUSANDS/ΜL (ref 0.6–4.47)
LYMPHOCYTES NFR BLD AUTO: 17 % (ref 14–44)
MCH RBC QN AUTO: 30.4 PG (ref 26.8–34.3)
MCHC RBC AUTO-ENTMCNC: 33.5 G/DL (ref 31.4–37.4)
MCV RBC AUTO: 91 FL (ref 82–98)
METHADONE UR QL: NEGATIVE
MONOCYTES # BLD AUTO: 1.21 THOUSAND/ΜL (ref 0.17–1.22)
MONOCYTES NFR BLD AUTO: 8 % (ref 4–12)
MUCOUS THREADS UR QL AUTO: ABNORMAL
NEUTROPHILS # BLD AUTO: 10.54 THOUSANDS/ΜL (ref 1.85–7.62)
NEUTS SEG NFR BLD AUTO: 74 % (ref 43–75)
NITRITE UR QL STRIP: POSITIVE
NON-SQ EPI CELLS URNS QL MICRO: ABNORMAL /HPF
NRBC BLD AUTO-RTO: 0 /100 WBCS
OPIATES UR QL SCN: NEGATIVE
OXYCODONE+OXYMORPHONE UR QL SCN: NEGATIVE
PCP UR QL: NEGATIVE
PH UR STRIP.AUTO: 5 [PH] (ref 4.5–8)
PLATELET # BLD AUTO: 202 THOUSANDS/UL (ref 149–390)
PMV BLD AUTO: 10.7 FL (ref 8.9–12.7)
POTASSIUM SERPL-SCNC: 3.7 MMOL/L (ref 3.5–5.3)
PROT SERPL-MCNC: 7.5 G/DL (ref 6.4–8.2)
PROT UR STRIP-MCNC: ABNORMAL MG/DL
RBC # BLD AUTO: 5.1 MILLION/UL (ref 3.81–5.12)
RBC #/AREA URNS AUTO: ABNORMAL /HPF
RSV RNA RESP QL NAA+PROBE: NEGATIVE
SARS-COV-2 RNA RESP QL NAA+PROBE: NEGATIVE
SODIUM SERPL-SCNC: 143 MMOL/L (ref 136–145)
SP GR UR STRIP.AUTO: >=1.03 (ref 1–1.03)
THC UR QL: POSITIVE
TSH SERPL DL<=0.05 MIU/L-ACNC: 4.7 UIU/ML (ref 0.36–3.74)
UROBILINOGEN UR QL STRIP.AUTO: 0.2 E.U./DL
WBC # BLD AUTO: 14.32 THOUSAND/UL (ref 4.31–10.16)
WBC #/AREA URNS AUTO: ABNORMAL /HPF

## 2021-12-21 PROCEDURE — 73080 X-RAY EXAM OF ELBOW: CPT

## 2021-12-21 PROCEDURE — 99283 EMERGENCY DEPT VISIT LOW MDM: CPT

## 2021-12-21 PROCEDURE — 80053 COMPREHEN METABOLIC PANEL: CPT | Performed by: EMERGENCY MEDICINE

## 2021-12-21 PROCEDURE — 81001 URINALYSIS AUTO W/SCOPE: CPT

## 2021-12-21 PROCEDURE — 82075 ASSAY OF BREATH ETHANOL: CPT | Performed by: EMERGENCY MEDICINE

## 2021-12-21 PROCEDURE — 0241U HB NFCT DS VIR RESP RNA 4 TRGT: CPT | Performed by: EMERGENCY MEDICINE

## 2021-12-21 PROCEDURE — 85025 COMPLETE CBC W/AUTO DIFF WBC: CPT | Performed by: EMERGENCY MEDICINE

## 2021-12-21 PROCEDURE — 99285 EMERGENCY DEPT VISIT HI MDM: CPT | Performed by: EMERGENCY MEDICINE

## 2021-12-21 PROCEDURE — 99284 EMERGENCY DEPT VISIT MOD MDM: CPT | Performed by: EMERGENCY MEDICINE

## 2021-12-21 PROCEDURE — 36415 COLL VENOUS BLD VENIPUNCTURE: CPT | Performed by: EMERGENCY MEDICINE

## 2021-12-21 PROCEDURE — 80307 DRUG TEST PRSMV CHEM ANLYZR: CPT | Performed by: EMERGENCY MEDICINE

## 2021-12-21 PROCEDURE — 93005 ELECTROCARDIOGRAM TRACING: CPT

## 2021-12-21 PROCEDURE — 84443 ASSAY THYROID STIM HORMONE: CPT | Performed by: EMERGENCY MEDICINE

## 2021-12-21 PROCEDURE — 99285 EMERGENCY DEPT VISIT HI MDM: CPT

## 2021-12-21 RX ORDER — HALOPERIDOL 5 MG/ML
2.5 INJECTION INTRAMUSCULAR
Status: CANCELLED | OUTPATIENT
Start: 2021-12-21

## 2021-12-21 RX ORDER — TRAZODONE HYDROCHLORIDE 50 MG/1
200 TABLET ORAL
Status: CANCELLED | OUTPATIENT
Start: 2021-12-21

## 2021-12-21 RX ORDER — BENZTROPINE MESYLATE 1 MG/1
1 TABLET ORAL
Status: DISCONTINUED | OUTPATIENT
Start: 2021-12-21 | End: 2021-12-23 | Stop reason: HOSPADM

## 2021-12-21 RX ORDER — HALOPERIDOL 5 MG
5 TABLET ORAL
Status: DISCONTINUED | OUTPATIENT
Start: 2021-12-21 | End: 2021-12-23 | Stop reason: HOSPADM

## 2021-12-21 RX ORDER — MINERAL OIL AND PETROLATUM 150; 830 MG/G; MG/G
1 OINTMENT OPHTHALMIC
Status: CANCELLED | OUTPATIENT
Start: 2021-12-21

## 2021-12-21 RX ORDER — HALOPERIDOL 5 MG/ML
5 INJECTION INTRAMUSCULAR
Status: CANCELLED | OUTPATIENT
Start: 2021-12-21

## 2021-12-21 RX ORDER — HYDROXYZINE 50 MG/1
100 TABLET, FILM COATED ORAL
Status: DISCONTINUED | OUTPATIENT
Start: 2021-12-21 | End: 2021-12-23 | Stop reason: HOSPADM

## 2021-12-21 RX ORDER — DIPHENHYDRAMINE HYDROCHLORIDE 50 MG/ML
50 INJECTION INTRAMUSCULAR; INTRAVENOUS EVERY 6 HOURS PRN
Status: CANCELLED | OUTPATIENT
Start: 2021-12-21

## 2021-12-21 RX ORDER — HYDROXYZINE 50 MG/1
50 TABLET, FILM COATED ORAL
Status: DISCONTINUED | OUTPATIENT
Start: 2021-12-21 | End: 2021-12-23 | Stop reason: HOSPADM

## 2021-12-21 RX ORDER — LORAZEPAM 2 MG/ML
2 INJECTION INTRAMUSCULAR
Status: CANCELLED | OUTPATIENT
Start: 2021-12-21

## 2021-12-21 RX ORDER — DIPHENHYDRAMINE HYDROCHLORIDE 50 MG/ML
50 INJECTION INTRAMUSCULAR; INTRAVENOUS EVERY 6 HOURS PRN
Status: DISCONTINUED | OUTPATIENT
Start: 2021-12-21 | End: 2021-12-23 | Stop reason: HOSPADM

## 2021-12-21 RX ORDER — LORAZEPAM 2 MG/ML
1 INJECTION INTRAMUSCULAR
Status: DISCONTINUED | OUTPATIENT
Start: 2021-12-21 | End: 2021-12-23 | Stop reason: HOSPADM

## 2021-12-21 RX ORDER — MAGNESIUM HYDROXIDE/ALUMINUM HYDROXICE/SIMETHICONE 120; 1200; 1200 MG/30ML; MG/30ML; MG/30ML
30 SUSPENSION ORAL EVERY 4 HOURS PRN
Status: CANCELLED | OUTPATIENT
Start: 2021-12-21

## 2021-12-21 RX ORDER — BUPROPION HYDROCHLORIDE 150 MG/1
300 TABLET ORAL DAILY
Status: CANCELLED | OUTPATIENT
Start: 2021-12-22

## 2021-12-21 RX ORDER — LORAZEPAM 2 MG/ML
1 INJECTION INTRAMUSCULAR
Status: CANCELLED | OUTPATIENT
Start: 2021-12-21

## 2021-12-21 RX ORDER — HALOPERIDOL 1 MG/1
1 TABLET ORAL EVERY 6 HOURS PRN
Status: DISCONTINUED | OUTPATIENT
Start: 2021-12-21 | End: 2021-12-23 | Stop reason: HOSPADM

## 2021-12-21 RX ORDER — ESTRADIOL 1 MG/1
1 TABLET ORAL DAILY
Status: DISCONTINUED | OUTPATIENT
Start: 2021-12-22 | End: 2021-12-23 | Stop reason: HOSPADM

## 2021-12-21 RX ORDER — AMOXICILLIN 250 MG
1 CAPSULE ORAL DAILY PRN
Status: DISCONTINUED | OUTPATIENT
Start: 2021-12-21 | End: 2021-12-23 | Stop reason: HOSPADM

## 2021-12-21 RX ORDER — OMEGA-3S/DHA/EPA/FISH OIL/D3 300MG-1000
400 CAPSULE ORAL DAILY
Status: CANCELLED | OUTPATIENT
Start: 2021-12-22

## 2021-12-21 RX ORDER — HALOPERIDOL 5 MG
5 TABLET ORAL
Status: CANCELLED | OUTPATIENT
Start: 2021-12-21

## 2021-12-21 RX ORDER — LORAZEPAM 2 MG/ML
2 INJECTION INTRAMUSCULAR EVERY 6 HOURS PRN
Status: CANCELLED | OUTPATIENT
Start: 2021-12-21

## 2021-12-21 RX ORDER — HALOPERIDOL 1 MG/1
1 TABLET ORAL EVERY 6 HOURS PRN
Status: CANCELLED | OUTPATIENT
Start: 2021-12-21

## 2021-12-21 RX ORDER — AMOXICILLIN 250 MG
1 CAPSULE ORAL DAILY PRN
Status: CANCELLED | OUTPATIENT
Start: 2021-12-21

## 2021-12-21 RX ORDER — HYDROXYZINE HYDROCHLORIDE 25 MG/1
50 TABLET, FILM COATED ORAL
Status: CANCELLED | OUTPATIENT
Start: 2021-12-21

## 2021-12-21 RX ORDER — MAGNESIUM HYDROXIDE/ALUMINUM HYDROXICE/SIMETHICONE 120; 1200; 1200 MG/30ML; MG/30ML; MG/30ML
30 SUSPENSION ORAL EVERY 4 HOURS PRN
Status: DISCONTINUED | OUTPATIENT
Start: 2021-12-21 | End: 2021-12-23 | Stop reason: HOSPADM

## 2021-12-21 RX ORDER — ACETAMINOPHEN 325 MG/1
975 TABLET ORAL EVERY 6 HOURS PRN
Status: CANCELLED | OUTPATIENT
Start: 2021-12-21

## 2021-12-21 RX ORDER — ALBUTEROL SULFATE 90 UG/1
2 AEROSOL, METERED RESPIRATORY (INHALATION) ONCE
Status: DISCONTINUED | OUTPATIENT
Start: 2021-12-21 | End: 2021-12-21

## 2021-12-21 RX ORDER — ACETAMINOPHEN 325 MG/1
975 TABLET ORAL EVERY 6 HOURS PRN
Status: DISCONTINUED | OUTPATIENT
Start: 2021-12-21 | End: 2021-12-23 | Stop reason: HOSPADM

## 2021-12-21 RX ORDER — HALOPERIDOL 1 MG/1
2.5 TABLET ORAL
Status: CANCELLED | OUTPATIENT
Start: 2021-12-21

## 2021-12-21 RX ORDER — BENZTROPINE MESYLATE 1 MG/ML
1 INJECTION INTRAMUSCULAR; INTRAVENOUS
Status: DISCONTINUED | OUTPATIENT
Start: 2021-12-21 | End: 2021-12-23 | Stop reason: HOSPADM

## 2021-12-21 RX ORDER — BISACODYL 10 MG
10 SUPPOSITORY, RECTAL RECTAL DAILY PRN
Status: DISCONTINUED | OUTPATIENT
Start: 2021-12-21 | End: 2021-12-23 | Stop reason: HOSPADM

## 2021-12-21 RX ORDER — TRAZODONE HYDROCHLORIDE 50 MG/1
50 TABLET ORAL
Status: CANCELLED | OUTPATIENT
Start: 2021-12-21

## 2021-12-21 RX ORDER — ESTRADIOL 1 MG/1
1 TABLET ORAL DAILY
Status: CANCELLED | OUTPATIENT
Start: 2021-12-22

## 2021-12-21 RX ORDER — TRAZODONE HYDROCHLORIDE 50 MG/1
200 TABLET ORAL
Status: DISCONTINUED | OUTPATIENT
Start: 2021-12-21 | End: 2021-12-21 | Stop reason: HOSPADM

## 2021-12-21 RX ORDER — LANOLIN ALCOHOL/MO/W.PET/CERES
3 CREAM (GRAM) TOPICAL
Status: CANCELLED | OUTPATIENT
Start: 2021-12-21

## 2021-12-21 RX ORDER — LANOLIN ALCOHOL/MO/W.PET/CERES
3 CREAM (GRAM) TOPICAL
Status: DISCONTINUED | OUTPATIENT
Start: 2021-12-21 | End: 2021-12-23 | Stop reason: HOSPADM

## 2021-12-21 RX ORDER — BENZTROPINE MESYLATE 0.5 MG/1
1 TABLET ORAL
Status: CANCELLED | OUTPATIENT
Start: 2021-12-21

## 2021-12-21 RX ORDER — HALOPERIDOL 5 MG/ML
5 INJECTION INTRAMUSCULAR
Status: DISCONTINUED | OUTPATIENT
Start: 2021-12-21 | End: 2021-12-23 | Stop reason: HOSPADM

## 2021-12-21 RX ORDER — OMEGA-3S/DHA/EPA/FISH OIL/D3 300MG-1000
400 CAPSULE ORAL DAILY
Status: DISCONTINUED | OUTPATIENT
Start: 2021-12-22 | End: 2021-12-23 | Stop reason: HOSPADM

## 2021-12-21 RX ORDER — ACETAMINOPHEN 325 MG/1
650 TABLET ORAL EVERY 6 HOURS PRN
Status: CANCELLED | OUTPATIENT
Start: 2021-12-21

## 2021-12-21 RX ORDER — ACETAMINOPHEN 325 MG/1
650 TABLET ORAL EVERY 4 HOURS PRN
Status: DISCONTINUED | OUTPATIENT
Start: 2021-12-21 | End: 2021-12-23 | Stop reason: HOSPADM

## 2021-12-21 RX ORDER — POLYETHYLENE GLYCOL 3350 17 G/17G
17 POWDER, FOR SOLUTION ORAL DAILY PRN
Status: DISCONTINUED | OUTPATIENT
Start: 2021-12-21 | End: 2021-12-23 | Stop reason: HOSPADM

## 2021-12-21 RX ORDER — ACETAMINOPHEN 325 MG/1
650 TABLET ORAL EVERY 6 HOURS PRN
Status: DISCONTINUED | OUTPATIENT
Start: 2021-12-21 | End: 2021-12-23 | Stop reason: HOSPADM

## 2021-12-21 RX ORDER — BISACODYL 10 MG
10 SUPPOSITORY, RECTAL RECTAL DAILY PRN
Status: CANCELLED | OUTPATIENT
Start: 2021-12-21

## 2021-12-21 RX ORDER — HYDROXYZINE HYDROCHLORIDE 25 MG/1
25 TABLET, FILM COATED ORAL
Status: CANCELLED | OUTPATIENT
Start: 2021-12-21

## 2021-12-21 RX ORDER — MINERAL OIL AND PETROLATUM 150; 830 MG/G; MG/G
1 OINTMENT OPHTHALMIC
Status: DISCONTINUED | OUTPATIENT
Start: 2021-12-21 | End: 2021-12-23 | Stop reason: HOSPADM

## 2021-12-21 RX ORDER — LORAZEPAM 2 MG/ML
2 INJECTION INTRAMUSCULAR EVERY 6 HOURS PRN
Status: DISCONTINUED | OUTPATIENT
Start: 2021-12-21 | End: 2021-12-23 | Stop reason: HOSPADM

## 2021-12-21 RX ORDER — LEVOTHYROXINE SODIUM 0.07 MG/1
75 TABLET ORAL
Status: DISCONTINUED | OUTPATIENT
Start: 2021-12-22 | End: 2021-12-23 | Stop reason: HOSPADM

## 2021-12-21 RX ORDER — LORAZEPAM 2 MG/ML
2 INJECTION INTRAMUSCULAR
Status: DISCONTINUED | OUTPATIENT
Start: 2021-12-21 | End: 2021-12-23 | Stop reason: HOSPADM

## 2021-12-21 RX ORDER — ACETAMINOPHEN 325 MG/1
650 TABLET ORAL EVERY 4 HOURS PRN
Status: CANCELLED | OUTPATIENT
Start: 2021-12-21

## 2021-12-21 RX ORDER — TRAZODONE HYDROCHLORIDE 50 MG/1
50 TABLET ORAL
Status: DISCONTINUED | OUTPATIENT
Start: 2021-12-21 | End: 2021-12-23 | Stop reason: HOSPADM

## 2021-12-21 RX ORDER — LOSARTAN POTASSIUM 50 MG/1
50 TABLET ORAL DAILY
Status: DISCONTINUED | OUTPATIENT
Start: 2021-12-22 | End: 2021-12-23 | Stop reason: HOSPADM

## 2021-12-21 RX ORDER — HYDROXYZINE HYDROCHLORIDE 25 MG/1
25 TABLET, FILM COATED ORAL
Status: DISCONTINUED | OUTPATIENT
Start: 2021-12-21 | End: 2021-12-23 | Stop reason: HOSPADM

## 2021-12-21 RX ORDER — HYDROXYZINE HYDROCHLORIDE 25 MG/1
100 TABLET, FILM COATED ORAL
Status: CANCELLED | OUTPATIENT
Start: 2021-12-21

## 2021-12-21 RX ORDER — BENZTROPINE MESYLATE 1 MG/ML
1 INJECTION INTRAMUSCULAR; INTRAVENOUS
Status: CANCELLED | OUTPATIENT
Start: 2021-12-21

## 2021-12-21 RX ORDER — CEPHALEXIN 500 MG/1
500 CAPSULE ORAL EVERY 12 HOURS SCHEDULED
Qty: 14 CAPSULE | Refills: 0 | Status: ON HOLD | OUTPATIENT
Start: 2021-12-21 | End: 2021-12-23 | Stop reason: SDUPTHER

## 2021-12-21 RX ORDER — BUPROPION HYDROCHLORIDE 300 MG/1
300 TABLET ORAL DAILY
Status: DISCONTINUED | OUTPATIENT
Start: 2021-12-22 | End: 2021-12-23 | Stop reason: HOSPADM

## 2021-12-21 RX ORDER — HALOPERIDOL 5 MG/ML
2.5 INJECTION INTRAMUSCULAR
Status: DISCONTINUED | OUTPATIENT
Start: 2021-12-21 | End: 2021-12-23 | Stop reason: HOSPADM

## 2021-12-21 RX ORDER — HALOPERIDOL 5 MG
2.5 TABLET ORAL
Status: DISCONTINUED | OUTPATIENT
Start: 2021-12-21 | End: 2021-12-23 | Stop reason: HOSPADM

## 2021-12-21 RX ORDER — BENZTROPINE MESYLATE 1 MG/ML
0.5 INJECTION INTRAMUSCULAR; INTRAVENOUS
Status: DISCONTINUED | OUTPATIENT
Start: 2021-12-21 | End: 2021-12-23 | Stop reason: HOSPADM

## 2021-12-21 RX ORDER — POLYETHYLENE GLYCOL 3350 17 G/17G
17 POWDER, FOR SOLUTION ORAL DAILY PRN
Status: CANCELLED | OUTPATIENT
Start: 2021-12-21

## 2021-12-21 RX ORDER — CEPHALEXIN 250 MG/1
500 CAPSULE ORAL ONCE
Status: COMPLETED | OUTPATIENT
Start: 2021-12-21 | End: 2021-12-21

## 2021-12-21 RX ORDER — LOSARTAN POTASSIUM 50 MG/1
50 TABLET ORAL DAILY
Status: CANCELLED | OUTPATIENT
Start: 2021-12-22

## 2021-12-21 RX ORDER — BENZTROPINE MESYLATE 1 MG/ML
0.5 INJECTION INTRAMUSCULAR; INTRAVENOUS
Status: CANCELLED | OUTPATIENT
Start: 2021-12-21

## 2021-12-21 RX ORDER — TRAZODONE HYDROCHLORIDE 100 MG/1
200 TABLET ORAL
Status: DISCONTINUED | OUTPATIENT
Start: 2021-12-22 | End: 2021-12-23 | Stop reason: HOSPADM

## 2021-12-21 RX ADMIN — CEPHALEXIN 500 MG: 250 CAPSULE ORAL at 22:00

## 2021-12-22 PROBLEM — N39.0 URINARY TRACT INFECTION: Status: RESOLVED | Noted: 2021-12-22 | Resolved: 2021-12-22

## 2021-12-22 PROBLEM — N39.0 URINARY TRACT INFECTION: Status: ACTIVE | Noted: 2021-12-22

## 2021-12-22 LAB
25(OH)D3 SERPL-MCNC: 32.6 NG/ML (ref 30–100)
ATRIAL RATE: 88 BPM
CHOLEST SERPL-MCNC: 216 MG/DL
FOLATE SERPL-MCNC: >20 NG/ML (ref 3.1–17.5)
HDLC SERPL-MCNC: 54 MG/DL
LDLC SERPL CALC-MCNC: 143 MG/DL (ref 0–100)
NONHDLC SERPL-MCNC: 162 MG/DL
P AXIS: 78 DEGREES
PR INTERVAL: 186 MS
QRS AXIS: 30 DEGREES
QRSD INTERVAL: 82 MS
QT INTERVAL: 378 MS
QTC INTERVAL: 457 MS
T WAVE AXIS: 34 DEGREES
TRIGL SERPL-MCNC: 93 MG/DL
VENTRICULAR RATE: 88 BPM
VIT B12 SERPL-MCNC: 565 PG/ML (ref 100–900)

## 2021-12-22 PROCEDURE — 99222 1ST HOSP IP/OBS MODERATE 55: CPT | Performed by: STUDENT IN AN ORGANIZED HEALTH CARE EDUCATION/TRAINING PROGRAM

## 2021-12-22 PROCEDURE — 80061 LIPID PANEL: CPT | Performed by: PSYCHIATRY & NEUROLOGY

## 2021-12-22 PROCEDURE — 93010 ELECTROCARDIOGRAM REPORT: CPT | Performed by: INTERNAL MEDICINE

## 2021-12-22 PROCEDURE — 82306 VITAMIN D 25 HYDROXY: CPT | Performed by: PSYCHIATRY & NEUROLOGY

## 2021-12-22 PROCEDURE — 99222 1ST HOSP IP/OBS MODERATE 55: CPT | Performed by: PHYSICIAN ASSISTANT

## 2021-12-22 PROCEDURE — 82607 VITAMIN B-12: CPT | Performed by: PSYCHIATRY & NEUROLOGY

## 2021-12-22 PROCEDURE — 86592 SYPHILIS TEST NON-TREP QUAL: CPT | Performed by: PSYCHIATRY & NEUROLOGY

## 2021-12-22 PROCEDURE — 82746 ASSAY OF FOLIC ACID SERUM: CPT | Performed by: PSYCHIATRY & NEUROLOGY

## 2021-12-22 RX ORDER — TOPIRAMATE 100 MG/1
100 TABLET, FILM COATED ORAL 2 TIMES DAILY
Status: DISCONTINUED | OUTPATIENT
Start: 2021-12-22 | End: 2021-12-23 | Stop reason: HOSPADM

## 2021-12-22 RX ORDER — MONTELUKAST SODIUM 4 MG/1
1 TABLET, CHEWABLE ORAL 2 TIMES DAILY
Status: DISCONTINUED | OUTPATIENT
Start: 2021-12-22 | End: 2021-12-23 | Stop reason: HOSPADM

## 2021-12-22 RX ORDER — DIVALPROEX SODIUM 500 MG/1
500 TABLET, EXTENDED RELEASE ORAL DAILY
Status: DISCONTINUED | OUTPATIENT
Start: 2021-12-22 | End: 2021-12-23 | Stop reason: HOSPADM

## 2021-12-22 RX ORDER — ALBUTEROL SULFATE 90 UG/1
2 AEROSOL, METERED RESPIRATORY (INHALATION) EVERY 4 HOURS PRN
Status: DISCONTINUED | OUTPATIENT
Start: 2021-12-22 | End: 2021-12-23 | Stop reason: HOSPADM

## 2021-12-22 RX ORDER — CEPHALEXIN 250 MG/1
500 CAPSULE ORAL EVERY 12 HOURS SCHEDULED
Status: DISCONTINUED | OUTPATIENT
Start: 2021-12-22 | End: 2021-12-23 | Stop reason: HOSPADM

## 2021-12-22 RX ADMIN — METOPROLOL TARTRATE 25 MG: 25 TABLET, FILM COATED ORAL at 00:08

## 2021-12-22 RX ADMIN — BUPROPION HYDROCHLORIDE 300 MG: 300 TABLET, FILM COATED, EXTENDED RELEASE ORAL at 09:38

## 2021-12-22 RX ADMIN — TRAZODONE HYDROCHLORIDE 200 MG: 100 TABLET ORAL at 21:13

## 2021-12-22 RX ADMIN — ESTRADIOL 1 MG: 1 TABLET ORAL at 17:16

## 2021-12-22 RX ADMIN — LOSARTAN POTASSIUM 50 MG: 50 TABLET, FILM COATED ORAL at 09:37

## 2021-12-22 RX ADMIN — MONTELUKAST SODIUM 1 G: 4 TABLET, CHEWABLE ORAL at 11:28

## 2021-12-22 RX ADMIN — LURASIDONE HYDROCHLORIDE 60 MG: 40 TABLET, FILM COATED ORAL at 17:15

## 2021-12-22 RX ADMIN — LEVOTHYROXINE SODIUM 75 MCG: 75 TABLET ORAL at 06:08

## 2021-12-22 RX ADMIN — MONTELUKAST SODIUM 1 G: 4 TABLET, CHEWABLE ORAL at 17:16

## 2021-12-22 RX ADMIN — CEPHALEXIN 500 MG: 250 CAPSULE ORAL at 09:39

## 2021-12-22 RX ADMIN — Medication 3 MG: at 00:08

## 2021-12-22 RX ADMIN — TOPIRAMATE 100 MG: 100 TABLET, FILM COATED ORAL at 17:15

## 2021-12-22 RX ADMIN — TOPIRAMATE 100 MG: 100 TABLET, FILM COATED ORAL at 10:38

## 2021-12-22 RX ADMIN — CEPHALEXIN 500 MG: 250 CAPSULE ORAL at 21:12

## 2021-12-22 RX ADMIN — HYDROXYZINE HYDROCHLORIDE 50 MG: 50 TABLET, FILM COATED ORAL at 07:56

## 2021-12-22 RX ADMIN — CHOLECALCIFEROL TAB 10 MCG (400 UNIT) 400 UNITS: 10 TAB at 09:35

## 2021-12-22 RX ADMIN — Medication 3 MG: at 21:12

## 2021-12-22 RX ADMIN — TRAZODONE HYDROCHLORIDE 50 MG: 50 TABLET ORAL at 00:08

## 2021-12-22 RX ADMIN — METOPROLOL TARTRATE 25 MG: 25 TABLET, FILM COATED ORAL at 09:39

## 2021-12-22 RX ADMIN — DIVALPROEX SODIUM 500 MG: 500 TABLET, EXTENDED RELEASE ORAL at 10:38

## 2021-12-22 RX ADMIN — METOPROLOL TARTRATE 25 MG: 25 TABLET, FILM COATED ORAL at 21:12

## 2021-12-22 NOTE — CASE MANAGEMENT
CM met with to discuss dc plans for 12/23/2021  Pt signed 72 hour notice on 12/21/2021 at 11:39pm     Pt was upset that she had not been seen by CM yet and CM explained the intake process and timing  Pt was becoming agitated, crying and reported "I am upset, every  I have never takes time for me and always pushes me off  My therapist has been calling today and waiting all day for a call back about my discharge tomorrow "    Pt reported that she asked ACT team for increased supports last week and reported that they did not have a clinician that could see her and pt reported "I told them and no one helped me "     Pt is adamant that she wants to dc before [place] to spend time with her dad so he is not alone  Pt spoke about her mom's death and reported "I have been dealing with it alone  I have my dad  I promised my mom I would take care of my dad "     Pt reported that she will need transportation home and requested to be told what time so she can call her dad to meet her at her apartment with her key

## 2021-12-22 NOTE — CASE MANAGEMENT
CM sent transportation request to [facility] for pt dc on Thursday 12/23/2021     Pt would like to be discharged to her [address] at: [address]
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
## 2021-12-22 NOTE — ASSESSMENT & PLAN NOTE
· Reports urinary frequency/urgency  · UA + nitrite, innumerable bacteria - also appears concentrated; pt admits to poor oral intake    Encourage oral hydration  · Continue keflex  · Follow up urine culture

## 2021-12-22 NOTE — NURSING NOTE
Pt sobbing loudly in room, states feeling guilty that she is away from her Father around [place]  Pt currently denies SI-verbally contracts for safety  Pt states "I cant do that to my Father, he lost my Mother and we only have each other " pt spoke about the difficulties of watching her Mother on hospice and how she feels she has not dealt with the grief since she passed in August  Pt states she doesn't have an interest in attending support groups as she doesn't like to speak in group settings however does feel 1:1 therapy is most beneficial  Pt reports poor appetite since her Mothers death and states she feels this is due to "nerves and just feeling guilt because my Mom couldn't eat on hospice " pt was able to be verbally de-escalated and received prn medications for anxiety

## 2021-12-22 NOTE — NURSING NOTE
Pt arrived to the unit with the following: On person:  Glasses  Underwear    Bedside:  PJ Pants "Minions"  PJ Pants "Cheese"  PJ Pants "[place]"  T-Shirt Navy  T-Shirt Olive  T-Shirt [employer]  Underwear x3  Slides And1  Stress [address] "Lone Survivor"  Note w/ important phone numbers    Locker:  PJ Pants "[employer]" w/ strings  PJ Pants "[place]"  T-Shirt [place]  Socks x8pr  Underwear x6  Bras x2 (underwire)  Masks x4  Avia trail sneakers  Jacket "[employer]"    Folder  Gel pencil set  Key on lanyard  Cellphone  Charge block (no charge cord)  Pink backpack    Nursing:  Prescription bottle: Colestipol HCL 1gm tablets    C-Pap machine w/ case (to be stored in locker when not in use)    All items inspected for safety/contraband/unit appropriateness and distributed as listed above

## 2021-12-22 NOTE — PLAN OF CARE
Patient participated in 1/2 groups for the day    Problem: Ineffective Coping  Goal: Participates in unit activities  Description: Interventions:  - Provide therapeutic environment   - Provide required programming   - Redirect inappropriate behaviors   Outcome: Progressing

## 2021-12-22 NOTE — DISCHARGE SUMMARY
Discharge Summary - [id number]  Rachel Parra [age] y o  female MRN: [id number]  Unit/Bed#: [facility] 256-01 Encounter: [id number]     Admission Date: 12/21/2021         Discharge Date: 12/23/21    Attending Psychiatrist: Tiny Juan*    Reason for Admission/HPI:     Per initial H&P from Dr Marisela Solis on 12/22/21:    "This is 48 yo female with hx of Schizoaffective disorder, borderline personality, anxiety and substance use admitted to inpatient unit on voluntary status for worsening of mood and suicidal ideations in the context of non compliance with treatment, substance use and psychosocial stressors  Patient reports that her mother passed away in August and since then feeling depressed  She recently stopped taking medications and was using Methamphetamines occassionally  She endorses fleeting suicidal ideations, reported to her Therapist, on her advise brought herself to ER and signed 201  Patient endorses depressed mood as holidays are difficult time with out her mother  She recently cut her legs superficially to relieve anxiety  Patient endorses mood swings, irritability and lashing out easily  She was on depakote, stopped few months ago and in favor of Trileptal which was eventually stopped due to side effects  She was also started on haldol decanoate 50 mg, last received 2 weeks ago  She wants to start Depakote again and continue Topomax a s she was stable on that dose  Patient was focused on discharge, she wants celebrate Helen with father, signed 67 hour notice upon arrival to unit "      Social History     Tobacco History     Smoking Status  Former Smoker Quit date  1/2/2018 Smoking Frequency  3 packs/day Smoking Tobacco Type  Cigarettes    Smokeless Tobacco Use  Never Used    Tobacco Comment  Started at age 14            Alcohol History     Alcohol Use Status  Not Currently Comment  Recovering alcoholic          Drug Use     Drug Use Status  Yes Types  Marijuana, Methamphetamines Comment  medical- vapes 3 times per wk at hs          Sexual Activity     Sexually Active  Not Currently          Activities of Daily Living    Not Asked               Additional Substance Use Detail     Questions Responses    Problems Due to Past Use of Alcohol?  No    Substance Use Assessment Substance use within the past 12 months    Alcohol Use Frequency Denies use in past 12 months    Cannabis frequency 3 or more times/week    Comment: 3 or more times/week on 9/12/2018     Heroin Frequency Denies use in past 12 months    Cannabis method Smoke    Comment: Smoke on 9/12/2018     Cannabis last use 9/11/18    Comment: 9/11/2018 on 9/12/2018     Cocaine frequency Never used    Comment: Never used on 12/22/2021     Crack Cocaine Frequency Denies use in past 12 months    Methamphetamine Frequency Experimented    Methamphetamine Method Snort    Methamphetamine Last Use & Amount 2 weeks ago    Narcotic Frequency Denies use in past 12 months    Benzodiazepine Frequency Denies use in past 12 months    Amphetamine frequency Denies use in past 12 months    Barbituate Frequency Denies use use in past 12 months    Inhalant frequency Never used    Comment: Never used on 7/6/2021     Hallucinogen frequency Never used    Comment: Never used on 7/6/2021     Ecstasy frequency Never used    Comment: Never used on 7/6/2021     Other drug frequency Never used    Comment: Never used on 12/22/2021     Opiate frequency Denies use in past 12 months    Last reviewed by Gloria Gaston RN on 12/22/2021          Past Medical History:   Diagnosis Date    Anxiety     Arthritis     oa cassandra knees    Asthma     good control- no medications    Yan's esophagus     Bipolar affective disorder (HCC)     Chronic pain disorder     lumbar    CPAP (continuous positive airway pressure) dependence     Degenerative disc disease at L5-S1 level     Deliberate self-cutting     Depression 09/16/2008    Disease of thyroid gland     hypo    MARTINEZ (dyspnea on exertion)     Drug overdose 10/28/2008    suicide attempt    Dysphagia     Dyspnea     Edema     BLE    GERD (gastroesophageal reflux disease)     High blood pressure     History of COVID-19 12/30/2020    Symptoms started on 12/30/2020 and then got worse  Today she is feeling a little bit better  She is a candidate for monoclonal antibody infusion and set for 1/6/21 @ 1pm at [facility]  01/07/21 - telemedicine -     Hypertension     Knee pain, bilateral     Especially right    Migraines     Obese     Overactive bladder     Sjogren's disease ([address] )     Sleep apnea     Stress incontinence     Suicidal ideations     Umbilical hernia     surgical repair today 4/24/2019    Use of cane as ambulatory aid     awaiting b/l knee replacement    Wears glasses      Past Surgical History:   Procedure Laterality Date    BREAST BIOPSY Right 1989    benign    CARPAL TUNNEL RELEASE Left     CHOLECYSTECTOMY  05/2003    Laparoscopic    COLONOSCOPY      01/12/2009    DILATION AND CURETTAGE OF UTERUS      ELBOW SURGERY Left     x2   No hardware    ESOPHAGOGASTRODUODENOSCOPY      FOOT SURGERY Left     Plantar fasciotomy    HYSTERECTOMY      laporoscopic, partial    KNEE ARTHROSCOPY Bilateral     OOPHORECTOMY Left 10/2015    MS ANAL SPHINCTEROTOMY N/A 8/31/2018    Procedure: EUA, LEFT PARTIAL INTERNAL SPHINCTEROTOMY;  Surgeon: Brenden Christian MD;  Location: [address] [place] [address] OR;  Service: Colorectal    MS GASTROCNEMIUS RECESSION Left 2/24/2021    Procedure: RECESSION GASTROC OPEN;  Surgeon: Britton Gill DPM;  Location: [address] [place] [address] OR;  [facility] OIBQ,0+L/L,XLSNC N/A 4/24/2019    Procedure: REPAIR HERNIA UMBILICAL LAPAROSCOPIC W/ ROBOTICS;  Surgeon: Phillip Huertas MD;  Location: [facility];  Service: General    REDUCTION MAMMAPLASTY Bilateral 1999    REPAIR LACERATION Left     left hand-5/18/2009    ROTATOR CUFF REPAIR Left     TONSILECTOMY AND ADNOIDECTOMY     Rosemary Fortune TONSILLECTOMY      US GUIDED MSK PROCEDURE  2021    VEIN LIGATION Bilateral     WISDOM TOOTH EXTRACTION         Medications: All current active medications have been reviewed  Medications prior to admission:    Prior to Admission Medications   Prescriptions Last Dose Informant Patient Reported? Taking?    COLESTIPOL HCL PO Unknown at Unknown time Self Yes No   Sig: Take 1 g by mouth 2 (two) times a day   Cholecalciferol (Vitamin D3) 125 MCG (5000 UT) TABS Unknown at Unknown time Self Yes No   Sig: Take 5,000 Units by mouth daily   Cyanocobalamin (VITAMIN B-12 PO) Unknown at Unknown time Self Yes No   Sig: Take by mouth daily   Patient not taking: Reported on 2021    Lurasidone HCl (LATUDA PO) Unknown at Unknown time Self Yes No   Sig: Take 60 mg by mouth daily at bedtime     OXcarbazepine (TRILEPTAL) 300 mg tablet Unknown at Unknown time Self Yes No   Patient not taking: Reported on 2021    RABEprazole (ACIPHEX) 20 MG tablet Unknown at Unknown time Self Yes No   Sig: Take 20 mg by mouth 2 (two) times a day     Toviaz 8 MG TB24 Unknown at Unknown time  No No   Sig: TAKE 1 TABLET (8 MG TOTAL) BY MOUTH DAILY   Patient taking differently: Take 4 mg by mouth daily     albuterol (PROVENTIL HFA,VENTOLIN HFA) 90 mcg/act inhaler Unknown at Unknown time Self Yes No   Sig: Inhale 2 puffs every 6 (six) hours as needed for wheezing   amLODIPine (NORVASC) 5 mg tablet Unknown at Unknown time Self No No   Sig: TAKE ONE TABLET BY MOUTH DAILY   buPROPion (WELLBUTRIN XL) 300 mg 24 hr tablet Unknown at Unknown time Self Yes No   Sig: Take 300 mg by mouth daily   cephalexin (Keflex) 500 mg capsule Unknown at Unknown time  No No   Sig: Take 1 capsule (500 mg total) by mouth every 12 (twelve) hours for 7 days   estradiol (ESTRACE) 1 mg tablet Unknown at Unknown time Self No No   Sig: Take 1 tablet (1 mg total) by mouth daily   fluticasone (FLONASE) 50 mcg/act nasal spray Unknown at Unknown time Self Yes No   Si spray into each nostril daily   Patient not taking: Reported on 12/21/2021    furosemide (LASIX) 20 mg tablet Unknown at Unknown time Self No No   Sig: TAKE ONE TABLET BY MOUTH DAILY   haloperidol decanoate (Haldol Decanoate) 50 mg/mL injection Unknown at Unknown time Self Yes No   Sig: Inject 50 mg into a muscle every 30 (thirty) days   ketorolac (ACULAR) 0 5 % ophthalmic solution Unknown at Unknown time Self Yes No   Patient not taking: Reported on 12/21/2021    levothyroxine 75 mcg tablet  Self No No   Sig: Take 1 tablet (75 mcg total) by mouth daily   loratadine (CLARITIN) 10 mg tablet Unknown at Unknown time Self Yes No   Sig: Take 10 mg by mouth daily   Patient not taking: Reported on 12/21/2021    losartan (COZAAR) 50 mg tablet Unknown at Unknown time Self Yes No   Sig: Take 50 mg by mouth daily   melatonin 3 mg 12/22/2021 at Unknown time  Yes Yes   Sig:     memantine (NAMENDA) 10 mg tablet Unknown at Unknown time Self Yes No   Sig: Take 10 mg by mouth 2 (two) times a day as needed     metoprolol tartrate (LOPRESSOR) 25 mg tablet 12/22/2021 at Unknown time  Yes Yes   Sig: Take 25 mg by mouth every 12 (twelve) hours     pilocarpine (SALAGEN) 5 mg tablet Unknown at Unknown time Self Yes No   Sig: Take 5 mg by mouth 2 (two) times a day   topiramate (TOPAMAX) 100 mg tablet Unknown at Unknown time Self Yes No   Sig: Take 100 mg by mouth 2 (two) times a day   traZODone (DESYREL) 100 mg tablet Unknown at Unknown time Self Yes No   Sig: Take 200 mg by mouth daily at bedtime       Facility-Administered Medications: None       Allergies: Allergies   Allergen Reactions    Percocet [Oxycodone-Acetaminophen] Headache     Severe headaches    Betadine [Povidone Iodine] Rash     Unsure if betadine or gauze post surgical  Got rash on leg          Objective     Vital signs in last 24 hours:    Temp:  [96 5 °F (35 8 °C)-98 1 °F (36 7 °C)] 96 5 °F (35 8 °C)  HR:  [63-77] 63  Resp:  [18] 18  BP: (110-134)/(59-74) 110/65    No intake or output data in the 24 hours ending 12/23/21 [id number]:     Edmar Cano was admitted to the inpatient psychiatric unit and started on [address] checks every 7 minutes  During the hospitalization she was encouraged to attend individual therapy, group therapy, milieu therapy and occupational therapy  Psychiatric medications were restarted during the hospital stay  To address depressive symptoms, mood instability and anxiety symptoms, Edmar Cano was treated with antidepressant Wellbutrin XL and Trazodone, mood stabilizer Depakote ER and Topamax and antipsychotic medication Latuda and Haldol Decanoate  Medication doses were continued during the hospital course  Wellbutrin XL was continued at 300mg daily  Trazodone was continued at 200mg qhs  Depakote ER was added at the dose of 500mg daily  Patient should have VPA level, CBC w/ diff and CMP draw as an outpatient in 1 week  Topamax was continued at 200mg qhs  Lutricia Camera was continued at 60mg qPM  Haldol Decanoate was administered at a dose of 50mg IM 2 weeks prior to admission  She will follow up with her ACT team in 2 weeks for her next injection  Prior to beginning of treatment medications risks and benefits and possible side effects including risk of liver impairment related to treatment with Depakote, risk of parkinsonian symptoms, Tardive Dyskinesia and metabolic syndrome related to treatment with antipsychotic medications and risk of suicidality and serotonin syndrome related to treatment with antidepressants were reviewed with Edmar Cano  She verbalized understanding and agreement for treatment  Upon admission Edmar Cano was seen by medical service for medical clearance for inpatient treatment and medical follow up  Blanca's symptoms improved over the hospital course  Patient signed a 72 hour notice upon arrival to the unit, voluntarily withdrawing herself from treatment   Initially after admission she was still feeling frustrated and overwhelmed  With adjustment of medications and therapeutic milieu her symptoms resolved  At the end of treatment Aris Moore was improved  Her mood was more stable at the time of discharge  Aris Moore denied suicidal ideation, intent or plan at the time of discharge and denied homicidal ideation, intent or plan at the time of discharge  There was no overt psychosis at the time of discharge  Aris Moore was participating appropriately in milieu at the time of discharge  Behavior was appropriate on the unit at the time of discharge  Sleep and appetite were improved  Aris Moore was tolerating medications and was not reporting any significant side effects at the time of discharge  We felt that Aris Moore achieved the maximum benefit of inpatient stay at that point, was at baseline at the end of the hospitalization and could now be discharged to a lower level of care setting  At the time of discharge, there were no grounds for involuntary commitment in the context of 72 hour notice as patient was denying suicidality, there was no homicidal ideation, and there was no overt psychosis  Additionally, patient was medication and meal compliant and was tending to ADLs without issues  The outpatient follow up with [facility] Team was arranged by the unit  upon discharge      Mental Status at Time of Discharge:     Appearance:  age appropriate, casually dressed, adequate grooming   Behavior:  cooperative, calm   Speech:  normal rate and volume   Mood:  normal   Affect:  normal range and intensity   Thought Process:  organized   Associations: concrete associations   Thought Content:  normal   Perceptual Disturbances: no auditory hallucinations, no visual hallucinations, does not appear responding to internal stimuli   Risk Potential: Suicidal ideation - None at present  Homicidal ideation - None at present  Potential for aggression - Not at present   Sensorium:  oriented to person, place and time/date   Memory:  recent and remote memory grossly intact   Consciousness:  alert and awake   Attention/Concentration: attention span and concentration appear shorter than expected for age   Insight:  fair   Judgment: fair   Gait/Station: normal gait/station   Motor Activity: no abnormal movements       Admission Diagnosis:    Principal Problem:    Schizoaffective disorder, bipolar type ([facility] )  Active Problems:    Benign essential hypertension    Hypothyroidism    Class 3 severe obesity due to excess calories with serious comorbidity and body mass index (BMI) of 40 0 to 44 9 in [facility])      Discharge Diagnosis:     Principal Problem:    Schizoaffective disorder, bipolar type ([place] [address] )  Active Problems:    Benign essential hypertension    Hypothyroidism    Class 3 severe obesity due to excess calories with serious comorbidity and body mass index (BMI) of 40 0 to 44 9 in [facility])  Resolved Problems:    Medical clearance for psychiatric admission    Urinary tract infection      Lab Results:   I have personally reviewed all pertinent laboratory/tests results    Most Recent Labs:   Lab Results   Component Value Date    WBC 14 32 (H) 12/21/2021    RBC 5 10 12/21/2021    HGB 15 5 (H) 12/21/2021    HCT 46 3 (H) 12/21/2021     12/21/2021    RDW 12 5 12/21/2021    NEUTROABS 10 54 (H) 12/21/2021    SODIUM 143 12/21/2021    K 3 7 12/21/2021     12/21/2021    CO2 27 12/21/2021    BUN 12 12/21/2021    CREATININE 0 85 12/21/2021    GLUC 113 12/21/2021    GLUF 91 02/17/2021    CALCIUM 9 5 12/21/2021    AST 37 12/21/2021    ALT 60 12/21/2021    ALKPHOS 91 12/21/2021    TP 7 5 12/21/2021    ALB 3 7 12/21/2021    TBILI 0 39 12/21/2021    CHOLESTEROL 216 (H) 12/22/2021    HDL 54 12/22/2021    TRIG 93 12/22/2021    LDLCALC 143 (H) 12/22/2021    NONHDLC 162 12/22/2021    VALPROICTOT 35 6 (L) 07/10/2021    HLN1UGKICAGA 4 699 (H) 12/21/2021    FREET4 0 94 09/27/2021    T3FREE 2 27 (L) 10/16/2019    PREGUR negative 08/28/2021    PREGSERUM Negative 05/24/2019    RPR Non-Reactive 12/22/2021    HGBA1C 4 9 12/18/2020    EAG 94 12/18/2020       Discharge Medications:    See after visit summary for all reconciled discharge medications provided to patient and family  Discharge instructions/Information to patient and family:     See after visit summary for information provided to patient and family  Provisions for Follow-Up Care:    See after visit summary for information related to follow-up care and any pertinent home health orders  Discharge Statement:    I spent 36 minutes discharging the patient  This time was spent on the day of discharge  I had direct contact with the patient on the day of discharge  Additional documentation is required if more than 30 minutes were spent on discharge:    I reviewed with Lois Lizarraga importance of compliance with medications and outpatient treatment after discharge  I discussed the medication regimen and possible side effects of the medications with Lois Lizarraga prior to discharge  At the time of discharge she was tolerating psychiatric medications  I discussed outpatient follow up with Lois Lizarraga  I reviewed with Lois Lizarraga crisis plan and safety plan upon discharge  Lois Lizarraga was advised to obtain Valproic Acid level, CBC/diff and CMP 1 week after discharge  Discharge on Two Antipsychotic Medications: Yes - Lois Lizarraga is being discharged on 2 antipsychotic agents (Latuda and Haldol Decanoate) due to the history of at least 3 antipsychotic medication trials and failure of multiple trials of monotherapy      Pancho Riley PA-C 12/23/21

## 2021-12-22 NOTE — TREATMENT PLAN
TREATMENT PLAN REVIEW - [address]  Rachel Parra [age] y o  1970 female MRN: [id number]    [address] [place] [address] Room / Bed: [facility] 256/[facility] 125-90 Encounter: [id number]          Admit Date/Time:  12/21/2021 11:13 PM    Treatment Team: Attending Provider: Bre Rowley MD; Security: [place]; Patient Care Technician: Rachel Scott; Consulting Physician: Louisa Islas MD; Care Manager: Elfego Garcia RN; Patient Care Assistant: Rajat Millan; Patient Care Technician: Trina Gar; Registered Nurse: Stephenie Law, RN; Registered Nurse: Estella Jasso RN; Charge Nurse: Sven Uribe RN; Security: DesireeNiterofrancisca; Patient Care Assistant: Em Romero;  : Yesi Marti    Diagnosis: Principal Problem:    Schizoaffective disorder, bipolar type ([facility] )  Active Problems:    Benign essential hypertension    Hypothyroidism    Class 3 severe obesity due to excess calories with serious comorbidity and body mass index (BMI) of 40 0 to 44 9 in adult [facility])    Medical clearance for psychiatric admission    Urinary tract infection      Patient Strengths/Assets: cooperative, motivation for treatment/growth, patient is on a voluntary commitment    Patient Barriers/Limitations: substance abuse    Short Term Goals: decrease in depressive symptoms, decrease in anxiety symptoms, decrease in suicidal thoughts, decrease in self abusive behaviors, decrease in level of agitation    Long Term Goals: improvement in depression, improvement in anxiety, resolution of depressive symptoms, stabilization of mood, free of suicidal thoughts, improved insight, adequate self care, adequate sleep, adequate appetite    Progress Towards Goals: starting psychiatric medications as prescribed    Recommended Treatment: medication management, patient medication education, group therapy, milieu therapy, continued Behavioral Health psychiatric evaluation/assessment process    Treatment Frequency: daily medication monitoring, group and milieu therapy daily, monitoring through interdisciplinary rounds, monitoring through weekly patient care conferences    Expected Discharge Date:  5-7 days    Discharge Plan: referral for outpatient medication management with a psychiatrist, referral for outpatient psychotherapy    Treatment Plan Created/Updated By: Sridhar Gomez MD
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
## 2021-12-22 NOTE — NURSING NOTE
Pt upset needs reassurance that her discharge planning will be complete before discharge, able to be redirected and focus on healthy coping skills

## 2021-12-22 NOTE — PLAN OF CARE
Problem: Alteration in Thoughts and Perception  Goal: Treatment Goal: Gain control of psychotic behaviors/thinking, reduce/eliminate presenting symptoms and demonstrate improved reality functioning upon discharge  Outcome: Progressing     Problem: Ineffective Coping  Goal: Identifies ineffective coping skills  Outcome: Progressing     Problem: Ineffective Coping  Goal: Identifies healthy coping skills  Outcome: Progressing

## 2021-12-22 NOTE — ASSESSMENT & PLAN NOTE
· Blood pressure elevated this AM, has not received her antihypertensives  · Continue metoprolol/losartan

## 2021-12-22 NOTE — CASE MANAGEMENT
CM called pt [facility] ACT team Elise Payan - [phone]) to provide an update and confirm that pt will be discharged on Thursday 12/23/2021  Pt signed 72 hour notice on 12/21/2021 @ 11:39pm     Nils Gao reported that pt has been having mood swings for months and reported that pt stopped taking her medications last Sunday  Nils Gao is aware that pt has started Depakote again  Nils Gao reported that someone from the ACT team will contact pt on Thursday after dc and then a team member will meet with her on Friday and over the weekend  She also reported that pt attends one of their rehab groups on Thursdays  Nils Gao reported that pt manages her own medications and would only need scripts for her new medications  Nils Gao requested that CM email pt dc summary to:   [email or web address]    CM will let Nils Gao know if dc plans change but pt will be scheduled for dc on 12/23/2021

## 2021-12-22 NOTE — CONSULTS
[address] 1970, 49 y o  female MRN: [id number]  Unit/Bed#: [facility] 256-01 Encounter: [id number]  Primary Care Provider: JHONATAN Sol   Date and time admitted to hospital: 12/21/2021 11:13 PM    Inpatient consult for Medical Clearance for [address] patient  Consult performed by: Dara Lebron PA-C  Consult ordered by: Mann Giles MD        Medical clearance for psychiatric admission  Assessment & Plan  · Vital signs stable at time of assessment  · CBC, CMP WNL  TSH mildly elevated  · UDS + methamphetamine, THC  · EKG: pending  · Patient appears medically stable at this time for inpatient psychiatric treatment    Urinary tract infection  Assessment & Plan  · Reports urinary frequency/urgency  · UA + nitrite, innumerable bacteria - also appears concentrated; pt admits to poor oral intake  Encourage oral hydration  · Continue keflex  · Follow up urine culture    Class 3 severe obesity due to excess calories with serious comorbidity and body mass index (BMI) of 40 0 to 44 9 in adult [facility])  Assessment & Plan  · BMI 42 60  · Lifestyle modifications    Hypothyroidism  Assessment & Plan  · Continue synthroid    Schizoaffective disorder, bipolar type ([address] )  Assessment & Plan  · Presented to the ED with worsening depression/anxiety, SI  · Further plan per psychiatry    Benign essential hypertension  Assessment & Plan  · Blood pressure elevated this AM, has not received her antihypertensives  · Continue metoprolol/losartan    VTE Prophylaxis:   Psychiatric unit, ambulatory    Recommendations for Discharge:  · Follow up with PCP after discharge    Counseling / Coordination of Care Time: 30 minutes Greater than 50% of total time spent on patient counseling and coordination of care  Collaboration of Care:  Were Recommendations Directly Discussed with Primary Treatment Team? Yes    History of Present Illness:  Ana Shepherd is a 49 y o  female who is originally admitted to the psychiatry service due to depression/anxiety, SI  We are consulted for medical clearance  Past medical history significant schizoaffective disorder, hypothyroidism, HTN, obesity  Patient presented to the ED due to worsening depression/anxiety, SI particularly following the recent loss of her mother in August   Patient reports urinary frequency/urgency  Admits to poor oral intake  Denies chest pain/palpitations, shortness of breath, nausea/vomiting, abdominal pain  Denies hematuria or flank pain  Review of Systems:  Review of Systems   Constitutional: Negative for chills, fatigue, fever and unexpected weight change  HENT: Negative for congestion, sore throat and trouble swallowing  Eyes: Negative for photophobia, pain and visual disturbance  Respiratory: Negative for cough, shortness of breath and wheezing  Cardiovascular: Negative for chest pain, palpitations and leg swelling  Gastrointestinal: Negative for abdominal pain, constipation, diarrhea, nausea and vomiting  Endocrine: Negative for polyuria  Genitourinary: Positive for frequency and urgency  Negative for difficulty urinating, dysuria, flank pain and hematuria  Musculoskeletal: Negative for back pain, myalgias, neck pain and neck stiffness  Skin: Negative for pallor and rash  Neurological: Negative for dizziness, tremors, syncope, weakness, light-headedness, numbness and headaches  Hematological: Does not bruise/bleed easily  Psychiatric/Behavioral: Positive for dysphoric mood and suicidal ideas  Negative for agitation and confusion  The patient is nervous/anxious          Past Medical and Surgical History:   Past Medical History:   Diagnosis Date    Anxiety     Arthritis     oa cassandra knees    Asthma     good control- no medications    Yan's esophagus     Bipolar affective disorder (HCC)     Chronic pain disorder     lumbar    CPAP (continuous positive airway pressure) dependence     Degenerative disc disease at L5-S1 level     Deliberate self-cutting     Depression 09/16/2008    Disease of thyroid gland     hypo    MARTINEZ (dyspnea on exertion)     Drug overdose 10/28/2008    suicide attempt    Dysphagia     Dyspnea     Edema     BLE    GERD (gastroesophageal reflux disease)     High blood pressure     History of COVID-19 12/30/2020    Symptoms started on 12/30/2020 and then got worse  Today she is feeling a little bit better  She is a candidate for monoclonal antibody infusion and set for 1/6/21 @ 1pm at [facility] [place]  01/07/21 - telemedicine -     Hypertension     Knee pain, bilateral     Especially right    Migraines     Obese     Overactive bladder     Sjogren's disease ([address] )     Sleep apnea     Stress incontinence     Suicidal ideations     Umbilical hernia     surgical repair today 4/24/2019    Use of cane as ambulatory aid     awaiting b/l knee replacement    Wears glasses        Past Surgical History:   Procedure Laterality Date    BREAST BIOPSY Right 1989    benign    CARPAL TUNNEL RELEASE Left     CHOLECYSTECTOMY  05/2003    Laparoscopic    COLONOSCOPY      01/12/2009    DILATION AND CURETTAGE OF UTERUS      ELBOW SURGERY Left     x2   No hardware    ESOPHAGOGASTRODUODENOSCOPY      FOOT SURGERY Left     Plantar fasciotomy    HYSTERECTOMY      laporoscopic, partial    KNEE ARTHROSCOPY Bilateral     OOPHORECTOMY Left 10/2015    MI ANAL SPHINCTEROTOMY N/A 8/31/2018    Procedure: EUA, LEFT PARTIAL INTERNAL SPHINCTEROTOMY;  Surgeon: Amber Helm MD;  Location: [facility] MAIN OR;  Service: Colorectal    MI GASTROCNEMIUS RECESSION Left 2/24/2021    Procedure: RECESSION GASTROC OPEN;  Surgeon: Isabell Galloway DPM;  Location: [facility] MAIN OR;  Service: [place] [id number] [address],3+N/S,UTW N/A 4/24/2019    Procedure: REPAIR HERNIA UMBILICAL LAPAROSCOPIC W/ ROBOTICS;  Surgeon: Tarsha James MD;  Location: [facility] Main OR;  Service: General    REDUCTION MAMMAPLASTY Bilateral 1999    REPAIR LACERATION Left     left hand-5/18/2009    ROTATOR CUFF REPAIR Left     TONSILECTOMY AND ADNOIDECTOMY      TONSILLECTOMY      US GUIDED MSK PROCEDURE  4/22/2021    VEIN LIGATION Bilateral     WISDOM TOOTH EXTRACTION         Meds/Allergies:  all medications and allergies reviewed    Allergies: Allergies   Allergen Reactions    Percocet [Oxycodone-Acetaminophen] Headache     Severe headaches    Betadine [Povidone Iodine] Rash     Unsure if betadine or gauze post surgical  Got rash on leg  Social History:  Marital Status: Single  Substance Use History:   Social History     Substance and Sexual Activity   Alcohol Use Not Currently    Comment: Recovering alcoholic     Social History     Tobacco Use   Smoking Status Former Smoker    Packs/day: 3 00    Types: Cigarettes    Quit date: 1/2/2018    Years since quitting: 3 9   Smokeless Tobacco Never Used   Tobacco Comment    Started at age 14  Social History     Substance and Sexual Activity   Drug Use Yes    Types: Marijuana, Methamphetamines    Comment: medical- vapes 3 times per wk at hs       Family History:  Family History   Problem Relation Age of Onset    Kidney cancer Mother     Diabetes Mother     Colon cancer Family     No Known Problems Father     No Known Problems Sister     Colon cancer Paternal Uncle     Colon cancer Maternal Uncle     Colon cancer Maternal Uncle        Physical Exam:   Vitals:   Blood Pressure: (!) 173/89 (12/22/21 0726)  Pulse: 82 (12/22/21 0726)  Temperature: 98 °F (36 7 °C) (12/22/21 0726)  Temp Source: Tympanic (12/22/21 0726)  Respirations: 18 (12/22/21 0726)  Height: 5' 5" (165 1 cm) (12/21/21 2324)  Weight - Scale: 116 kg (256 lb) (12/21/21 2324)  SpO2: 98 % (12/21/21 2324)    Physical Exam  Vitals and nursing note reviewed  Constitutional:       Appearance: Normal appearance  Comments: Appears comfortable, no acute distress   HENT:      Head: Normocephalic  Eyes:      General: No scleral icterus  Extraocular Movements: Extraocular movements intact  Conjunctiva/sclera: Conjunctivae normal    Cardiovascular:      Rate and Rhythm: Normal rate and regular rhythm  Heart sounds: S1 normal and S2 normal    Pulmonary:      Effort: Pulmonary effort is normal       Breath sounds: Normal breath sounds  No wheezing, rhonchi or rales  Abdominal:      General: Bowel sounds are normal       Palpations: Abdomen is soft  Tenderness: There is no abdominal tenderness  There is no guarding or rebound  Musculoskeletal:         General: No swelling, tenderness or deformity  Cervical back: Normal range of motion  Comments: Able to move upper/lower ext bilaterally, no edema   Skin:     General: Skin is warm and dry  Neurological:      Mental Status: She is alert and oriented to person, place, and time  Psychiatric:         Mood and Affect: Mood is depressed  Affect is tearful  Additional Data:   Lab Results:    Results from last 7 days   Lab Units 12/21/21 2024   WBC Thousand/uL 14 32*   HEMOGLOBIN g/dL 15 5*   HEMATOCRIT % 46 3*   PLATELETS Thousands/uL 202   NEUTROS PCT % 74   LYMPHS PCT % 17   MONOS PCT % 8   EOS PCT % 0     Results from last 7 days   Lab Units 12/21/21 2024   SODIUM mmol/L 143   POTASSIUM mmol/L 3 7   CHLORIDE mmol/L 105   CO2 mmol/L 27   BUN mg/dL 12   CREATININE mg/dL 0 85   ANION GAP mmol/L 11   CALCIUM mg/dL 9 5   ALBUMIN g/dL 3 7   TOTAL BILIRUBIN mg/dL 0 39   ALK PHOS U/L 91   ALT U/L 60   AST U/L 37   GLUCOSE RANDOM mg/dL 113             Lab Results   Component Value Date/Time    HGBA1C 4 9 12/18/2020 09:07 AM    HGBA1C 5 3 06/26/2020 03:37 PM    HGBA1C 5 4 10/16/2019 10:52 AM               Imaging: No pertinent imaging reviewed  No orders to display       EKG, Pathology, and Other Studies Reviewed on Admission:   · EKG: pending      ** Please Note: This note may have been constructed using a voice recognition system   **

## 2021-12-22 NOTE — BH TRANSITION RECORD
Contact Information: If you have any questions, concerns, pended studies, tests and/or procedures, or emergencies regarding your inpatient behavioral health visit  Please contact [facility] [phone] and ask to speak to a , nurse or physician  A contact is available 24 hours/ 7 days a week at this number  Summary of Procedures Performed During your Stay:  Below is a list of major procedures performed during your hospital stay and a summary of results:  - Cardiac Procedures/Studies: ekg  - Major Imaging Studies: xray L elbow  Pending Studies (From admission, onward)     Start     Ordered    12/21/21 2336  RPR   Once         12/21/21 2335              If studies are pending at discharge, follow up with your PCP and/or referring provider

## 2021-12-22 NOTE — NURSING NOTE
Pt received Atarax 50mg for elevated anxiety this morning  Visibly upset and tearful  Med appears to have been mildly effective as pt has since been able to calm down  No longer tearful

## 2021-12-22 NOTE — ASSESSMENT & PLAN NOTE
· Vital signs stable at time of assessment  · CBC, CMP WNL  TSH mildly elevated      · UDS + methamphetamine, THC  · EKG: pending  · Patient appears medically stable at this time for inpatient psychiatric treatment

## 2021-12-22 NOTE — CMS CERTIFICATION NOTE
Recertification: Based upon physical, mental and social evaluations, I certify that inpatient psychiatric services continue to be medically necessary for this patient for a duration of 7 midnights for the treatment of  Schizoaffective disorder, bipolar type [facility])   Available alternative community resources still do not meet the patient's mental health care needs  I further attest that an established written individualized plan of care has been updated and is outlined in the patient's medical records

## 2021-12-22 NOTE — NURSING NOTE
Nurse will follow up on reason of admission with patient   Provide education and encourage group and medication compliance

## 2021-12-23 VITALS
BODY MASS INDEX: 42.65 KG/M2 | HEART RATE: 63 BPM | DIASTOLIC BLOOD PRESSURE: 65 MMHG | RESPIRATION RATE: 18 BRPM | WEIGHT: 256 LBS | OXYGEN SATURATION: 98 % | SYSTOLIC BLOOD PRESSURE: 110 MMHG | TEMPERATURE: 96.5 F | HEIGHT: 65 IN

## 2021-12-23 LAB — RPR SER QL: NORMAL

## 2021-12-23 PROCEDURE — 99239 HOSP IP/OBS DSCHRG MGMT >30: CPT | Performed by: STUDENT IN AN ORGANIZED HEALTH CARE EDUCATION/TRAINING PROGRAM

## 2021-12-23 RX ORDER — CEPHALEXIN 500 MG/1
500 CAPSULE ORAL EVERY 12 HOURS SCHEDULED
Qty: 14 CAPSULE | Refills: 0 | Status: SHIPPED | OUTPATIENT
Start: 2021-12-23 | End: 2021-12-30

## 2021-12-23 RX ORDER — DIVALPROEX SODIUM 500 MG/1
500 TABLET, EXTENDED RELEASE ORAL DAILY
Qty: 30 TABLET | Refills: 1 | Status: SHIPPED | OUTPATIENT
Start: 2021-12-23 | End: 2022-01-10 | Stop reason: HOSPADM

## 2021-12-23 RX ADMIN — TOPIRAMATE 100 MG: 100 TABLET, FILM COATED ORAL at 09:54

## 2021-12-23 RX ADMIN — MONTELUKAST SODIUM 1 G: 4 TABLET, CHEWABLE ORAL at 09:54

## 2021-12-23 RX ADMIN — LEVOTHYROXINE SODIUM 75 MCG: 75 TABLET ORAL at 06:49

## 2021-12-23 RX ADMIN — METOPROLOL TARTRATE 25 MG: 25 TABLET, FILM COATED ORAL at 09:54

## 2021-12-23 RX ADMIN — ESTRADIOL 1 MG: 1 TABLET ORAL at 09:54

## 2021-12-23 RX ADMIN — DIVALPROEX SODIUM 500 MG: 500 TABLET, EXTENDED RELEASE ORAL at 09:54

## 2021-12-23 RX ADMIN — LOSARTAN POTASSIUM 50 MG: 50 TABLET, FILM COATED ORAL at 09:54

## 2021-12-23 RX ADMIN — BUPROPION HYDROCHLORIDE 300 MG: 300 TABLET, FILM COATED, EXTENDED RELEASE ORAL at 09:54

## 2021-12-23 RX ADMIN — CHOLECALCIFEROL TAB 10 MCG (400 UNIT) 400 UNITS: 10 TAB at 09:54

## 2021-12-23 RX ADMIN — CEPHALEXIN 500 MG: 250 CAPSULE ORAL at 09:54

## 2021-12-23 NOTE — NURSING NOTE
Pt remains pleasant and calm  Denies SI/HI, verbally contracts for safety  Reports being hopeful for discharge so she can celebrate Helen with her father  Pt denies any concerns or questions with scheduled medications  Feels mood has improved  Reports she slept well overnight

## 2021-12-23 NOTE — PROGRESS NOTES
Diagnosis of Schizoaffective disorder, bipolar type reviewed  Short term goals for decrease in depressive symptoms, decrease in anxiety symptoms, decrease in suicidal thoughts, decrease in self abusive behaviors, decrease in level of agitation discussed  All present parties in agreement and treatment plan signed      12/22/21 5021   Team Meeting   Meeting Type Tx Team Meeting   Team Members Present   Team Members Present Physician;Nurse;   Physician Team Member Dr Silvia Hodgson Team Member [facility] Management Team Member Diego   Patient/Family Present   Patient Present No  (pt declined to meet with team)   Patient's Family Present No

## 2021-12-23 NOTE — DISCHARGE INSTR - OTHER ORDERS
[facility] INFORMATION   The PEER LINE is a toll-free telephone number for people in [facility] who are seeking a listening ear for additional support in their recovery from mental illness  The PEER LINE is peer-run and peer-friendly  You can call the Peer Line 24 hours a day  Phone: 3-906-GI-PEERS /(3-495.836.8082)   Emergency [facility] Emergency Services: [phone]  [address]  [address] [facility] , [place], [address] [place] [address]     Text CONNECT to 748673 from anywhere in the [place], anytime, about any type of crisis  A live, trained Crisis Counselor receives the text and lets you know that they are here to listen  The volunteer Crisis Counselor will help you move from a hot moment to a cool moment  Warm Line: (228) [phone], ([phone]) [phone], 0817-8283484  If it is not quite a crisis, but you want to talk to someone, 24 hours/day, 7 days/week:  Someone to listen; someone who cares  The [facility] on [facility]ness ([facility]) offers various education & support groups for you & your family  For more information visit their website at   http://www Satomi/     Dial 2-1-1 to get connected/get help  Free, confidential information & referral available 24/7: Aging Services, Child & Youth Services, Counseling, Education/Training, Food/Shelter/Clothing, Health Services, Parenting, Substance Abuse, Support Groups, Volunteer Opportunities, & much more    Phone: 2-1-1 or 093-818-7660, Web: FABIOLA TG041SMEG MERY, Email: [email or web address]

## 2021-12-23 NOTE — NURSING NOTE
AVS discharge instructions reviewed with patient  Patient denies any concerns or questions and expresses readiness for discharge  Patient discharged from the unit pleasant and calm

## 2021-12-23 NOTE — DISCHARGE INSTR - APPOINTMENTS
Nikki Torres RN, our [employer], will be calling you after your discharge, on the phone number that you provided  She will be available as an additional support, if needed  If you wish to speak with her, you may contact Valentín Gonzalez at [phone]

## 2021-12-23 NOTE — PLAN OF CARE
Problem: Alteration in Thoughts and Perception  Goal: Treatment Goal: Gain control of psychotic behaviors/thinking, reduce/eliminate presenting symptoms and demonstrate improved reality functioning upon discharge  Outcome: Adequate for Discharge  Goal: Verbalize thoughts and feelings  Description: Interventions:  - Promote a nonjudgmental and trusting relationship with the patient through active listening and therapeutic communication  - Assess patient's level of functioning, behavior and potential for risk  - Engage patient in 1 on 1 interactions  - Encourage patient to express fears, feelings, frustrations, and discuss symptoms    - [place] patient to reality, help patient recognize reality-based thinking   - Administer medications as ordered and assess for potential side effects  - Provide the patient education related to the signs and symptoms of the illness and desired effects of prescribed medications  Outcome: Adequate for Discharge     Problem: Ineffective Coping  Goal: Identifies ineffective coping skills  Outcome: Adequate for Discharge  Goal: Identifies healthy coping skills  Outcome: Adequate for Discharge  Goal: Demonstrates healthy coping skills  Outcome: Adequate for Discharge  Goal: Participates in unit activities  Description: Interventions:  - Provide therapeutic environment   - Provide required programming   - Redirect inappropriate behaviors   Outcome: Adequate for Discharge     Problem: Depression  Goal: Treatment Goal: Demonstrate behavioral control of depressive symptoms, verbalize feelings of improved mood/affect, and adopt new coping skills prior to discharge  Outcome: Adequate for Discharge  Goal: Verbalize thoughts and feelings  Description: Interventions:  - Assess and re-assess patient's level of risk   - Engage patient in 1:1 interactions, daily, for a minimum of 15 minutes   - Encourage patient to express feelings, fears, frustrations, hopes   Outcome: Adequate for Discharge  Goal: Refrain from harming self  Description: Interventions:  - Monitor patient closely, per order   - Supervise medication ingestion, monitor effects and side effects   Outcome: Adequate for Discharge  Goal: Refrain from isolation  Description: Interventions:  - Develop a trusting relationship   - Encourage socialization   Outcome: Adequate for Discharge  Goal: Refrain from self-neglect  Outcome: Adequate for Discharge     Problem: Anxiety  Goal: Anxiety is at manageable level  Description: Interventions:  - Assess and monitor patient's anxiety level  - Monitor for signs and symptoms (heart palpitations, chest pain, shortness of breath, headaches, nausea, feeling jumpy, restlessness, irritable, apprehensive)  - Collaborate with interdisciplinary team and initiate plan and interventions as ordered  - [place] patient to unit/surroundings  - Explain treatment plan  - Encourage participation in care  - Encourage verbalization of concerns/fears  - Identify coping mechanisms  - Assist in developing anxiety-reducing skills  - Administer/offer alternative therapies  - Limit or eliminate stimulants  Outcome: Adequate for Discharge     Problem: Nutrition/Hydration-ADULT  Goal: Nutrient/Hydration intake appropriate for improving, restoring or maintaining nutritional needs  Description: Monitor and assess patient's nutrition/hydration status for malnutrition  Collaborate with interdisciplinary team and initiate plan and interventions as ordered  Monitor patient's weight and dietary intake as ordered or per policy  Utilize nutrition screening tool and intervene as necessary  Determine patient's food preferences and provide high-protein, high-caloric foods as appropriate       INTERVENTIONS:  - Monitor oral intake, urinary output, labs, and treatment plans  - Assess nutrition and hydration status and recommend course of action  - Evaluate amount of meals eaten  - Assist patient with eating if necessary   - Allow adequate time for meals  - Recommend/ encourage appropriate diets, oral nutritional supplements, and vitamin/mineral supplements  - Order, calculate, and assess calorie counts as needed  - Recommend, monitor, and adjust tube feedings and TPN/PPN based on assessed needs  - Assess need for intravenous fluids  - Provide specific nutrition/hydration education as appropriate  - Include patient/family/caregiver in decisions related to nutrition  Outcome: Adequate for Discharge     Problem: DISCHARGE PLANNING  Goal: Discharge to home or other facility with appropriate resources  Description: INTERVENTIONS:  - Identify barriers to discharge w/patient and caregiver  - Arrange for needed discharge resources and transportation as appropriate  - Identify discharge learning needs (meds, wound care, etc )  - Arrange for interpretive services to assist at discharge as needed  - Refer to Case Management Department for coordinating discharge planning if the patient needs post-hospital services based on physician/advanced practitioner order or complex needs related to functional status, cognitive ability, or social support system  Outcome: Adequate for Discharge

## 2021-12-23 NOTE — CASE MANAGEMENT
INTAKE    Readmit score:  27 - Red   Confirmed Address   [address] [facility] [place]      Resides in the home with:    Pt lives in apartment alone    Pt mom  in 2021  Pt has the support of her father  She has a brother but reported "he is useless " Pt did not identify any other supports at time of intake  Pt Will Return Home at Discharge: Yes   Confirmed Phone Number: [phone]   Confirmed Email Address: [email or web address]   Marital Status:     Children: Single    None      Pt signed 72 hour notice on:   2021 @ 11:39pm & will dc on 21   Commitment Status: 201   Admitted from:    [facility] ED    Presenting C/O:             ED note 21  "    Patient arrives per EMS from home with report of increased depression and SI ideation and cutting wrist related to recent passing of mother  Superficial abrasions noted on BUE and BLE  Patient originally wanting to sign 12 for inpatient treatment with discharge home after 72hrs by Helen  Patient informed that this may not be possible  Patient then became angry and agressive  Jory HI      48 yo female with h/o bipolar d/o and anxiety presents with SI and cutting  Pt reports that she has been depressed because this is the first holiday season without her mother  Pt seen in ED last week for same, at that time pt could contract for safety and she was promised increased outpatient support  Apparently there was some confusion over the increased therapy schedule and pt became upset when her texts when unanswered by mental health workers who help manage her care  She does admit to cutting her wrists and thighs last night, denies ingestion or other gesture of self harm  She does admit to "using a little meth"  Denies AH/VH/HI  Became upset while in the ED today when told that if she is 302'd she might not be able to go home for [place]  Reportedly pt very agitated and tearful    Denies systemic complaints including chest pain, SOB, n/v/d/f/c/URI symptoms or cough "       Past Inpatient Tx:    Pt has past hx of hospitalizations    Current outpatient: Pt is supported by [facility] ACT team   Cely Magdi ([phone]) [email or web address]    ACT team will contact pt after dc on 12/23/2021 and meet with her in person on Friday 12/24/2021    Psychiatrist:    Yg Atkinson    Therapist:    Yg Atkinson   ACT/ICM/CPS/WRT/SC: [facility] ACT   PCP:    Claudia Lopez   Hx:    Pt has past hx of hospitalizations    Medications:    Pt manages her own medications  Wellbutrin XR, Depakote ER, William Dakin, Trazodone   Pharmacy:    [address] [place] [address]     Kody Elias [phone]   Phone: [phone] Fax: [phone]        Spirituality/Restorationism:    None    Education:    Unknown   Work/Income:      Pt reported that she works part-time  She reported that she had to recently take a leave of absence due to her depression and mental health  Legal:     Denies      Probation/[place] Ofc: Denies        Access to Firearms:    Denies   Transportation:    Pt will need transportation    Referrals Needed: NONE    Transport at Discharge:    Pt will need transportation    IMM:  Signed Intake [employer] Text BAY: N/A   Emergency Contact:     Leonor Ji (Friend)   ROIs obtained:       [facility] ACT   Insurance:     Medicare   Audit:        PAWSS:  BAT:  UDS: Positive for Meth and THC   Substance Abuse: Freq   Amount Last Use Notes:   Heroin           Amp/Meth  "sometimes"         Alcohol           Cocaine           Cannabis           Benzodiazepine           Barbituartes           Other           Tobacco

## 2021-12-27 ENCOUNTER — RA CDI HCC (OUTPATIENT)
Dept: OTHER | Facility: HOSPITAL | Age: 51
End: 2021-12-27

## 2021-12-27 NOTE — CASE MANAGEMENT
12/27/2021 3:03pm-     CM emailed pt dc summary to ACT team member Magui Collazo at email:   [email or web address]  org as pt discharged on 12/23/2021

## 2021-12-28 NOTE — PROGRESS NOTES
New admission - 201 from [address] [place] [address] ED  Increased depression, SI, superficial cuts to wrists and thighs  Mom  in August  Agitated in ED, did not want to go inpatient  Pt reported using Meth 2 days ago  DC: Thursday (?) pt signed 72 hour notice on  @2329     21 0758   Team Meeting   Meeting Type Daily Rounds   Team Members Present   Team Members Present Physician;Nurse;; Other (Discipline and Name)   Physician Team Member Dr Alfreda Severance / Meera Quintana Team Member Livia Chisholm / Cal May Management Team Member Emperatriz Sharif / Virgilio Garza   Other (Discipline and Name) Dannie Mccoy - Art Therapy   Patient/Family Present   Patient Present No   Patient's Family Present No

## 2021-12-28 NOTE — PROGRESS NOTES
Pt had minor outburst during the day, received Atarax  Was more pleasant in the evening  DC: Today      12/23/21 0800   Team Meeting   Meeting Type Daily Rounds   Team Members Present   Team Members Present Physician;Nurse;; Other (Discipline and Name)   Physician Team Member Dr Aston Mejía / Larry Corea Team Member Mary Ann Briones / Norma Ace Management Team Member Pablo Belle / Zaida Solitario   Other (Discipline and Name) Steph Guerrero - Art Therapy   Patient/Family Present   Patient Present No   Patient's Family Present No

## 2021-12-30 ENCOUNTER — TELEMEDICINE (OUTPATIENT)
Dept: FAMILY MEDICINE CLINIC | Facility: CLINIC | Age: 51
End: 2021-12-30
Payer: MEDICARE

## 2021-12-30 ENCOUNTER — TELEPHONE (OUTPATIENT)
Dept: FAMILY MEDICINE CLINIC | Facility: CLINIC | Age: 51
End: 2021-12-30

## 2021-12-30 VITALS — TEMPERATURE: 97.6 F

## 2021-12-30 DIAGNOSIS — B34.9 VIRAL INFECTION, UNSPECIFIED: Primary | ICD-10-CM

## 2021-12-30 DIAGNOSIS — I10 HYPERTENSION, UNSPECIFIED TYPE: ICD-10-CM

## 2021-12-30 PROCEDURE — 99213 OFFICE O/P EST LOW 20 MIN: CPT | Performed by: NURSE PRACTITIONER

## 2021-12-30 RX ORDER — FUROSEMIDE 20 MG/1
20 TABLET ORAL DAILY
Qty: 90 TABLET | Refills: 3 | Status: ON HOLD | OUTPATIENT
Start: 2021-12-30 | End: 2022-01-10 | Stop reason: SDUPTHER

## 2022-01-02 ENCOUNTER — TELEPHONE (OUTPATIENT)
Dept: OTHER | Facility: OTHER | Age: 52
End: 2022-01-02

## 2022-01-02 NOTE — TELEPHONE ENCOUNTER
Patient called today regarding she needs a Work Note to go back to Work on Tuesday  She was Tested for Covid on December 30, 2021 at [address] [facility]  The Lab called Patient to Notify her that her test was Lost and that they never received it

## 2022-01-03 ENCOUNTER — TELEPHONE (OUTPATIENT)
Dept: FAMILY MEDICINE CLINIC | Facility: CLINIC | Age: 52
End: 2022-01-03

## 2022-01-03 NOTE — TELEPHONE ENCOUNTER
Patient is crying on the phone - states that if she goes to [place] she will waiting for hours, patient will stop by this afternoon to get tested   Are we doing the combo or just the covid

## 2022-01-03 NOTE — TELEPHONE ENCOUNTER
patient called and is crying because she hasn't heard from you all weekend  She states that the lab has lost her test from 12/30 and she needs to know if she needs to retest to return to work  She doesn't know if it is required and mentioned several times about standing in line and waiting  She wants to get retested to return to work

## 2022-01-03 NOTE — TELEPHONE ENCOUNTER
Patient called back crying, wants to know why she has not heard back yet regarding test and is upset that she will have to miss work tomorrow due to the lost swab  Explained we are waiting to hear back regarding order verification to call her to come to the office for testing, she stated she understood

## 2022-01-03 NOTE — TELEPHONE ENCOUNTER
Advise I was waiting results  I just was notified today her test was lost  She can come to office if she is concerned with waiting  Please Place order  I cant get to laptop at this time  She lives closer to [place] I think  Up to her  How are her symptoms?

## 2022-01-04 ENCOUNTER — HOSPITAL ENCOUNTER (EMERGENCY)
Facility: HOSPITAL | Age: 52
End: 2022-01-05
Attending: EMERGENCY MEDICINE | Admitting: EMERGENCY MEDICINE
Payer: MEDICARE

## 2022-01-04 DIAGNOSIS — E78.2 MIXED HYPERLIPIDEMIA: ICD-10-CM

## 2022-01-04 DIAGNOSIS — R45.851 SUICIDAL IDEATION: ICD-10-CM

## 2022-01-04 DIAGNOSIS — E03.9 HYPOTHYROIDISM: ICD-10-CM

## 2022-01-04 DIAGNOSIS — F25.9 SCHIZOAFFECTIVE DISORDER (HCC): Primary | ICD-10-CM

## 2022-01-04 DIAGNOSIS — Z00.8 MEDICAL CLEARANCE FOR PSYCHIATRIC ADMISSION: ICD-10-CM

## 2022-01-04 DIAGNOSIS — I10 BENIGN ESSENTIAL HYPERTENSION: ICD-10-CM

## 2022-01-04 LAB
AMPHETAMINES SERPL QL SCN: POSITIVE
BARBITURATES UR QL: NEGATIVE
BENZODIAZ UR QL: NEGATIVE
COCAINE UR QL: NEGATIVE
ETHANOL EXG-MCNC: 0 MG/DL
FLUAV RNA RESP QL NAA+PROBE: NEGATIVE
FLUBV RNA RESP QL NAA+PROBE: NEGATIVE
METHADONE UR QL: NEGATIVE
OPIATES UR QL SCN: NEGATIVE
OXYCODONE+OXYMORPHONE UR QL SCN: NEGATIVE
PCP UR QL: NEGATIVE
RSV RNA RESP QL NAA+PROBE: NEGATIVE
SARS-COV-2 RNA RESP QL NAA+PROBE: NEGATIVE
THC UR QL: POSITIVE

## 2022-01-04 PROCEDURE — 99285 EMERGENCY DEPT VISIT HI MDM: CPT

## 2022-01-04 PROCEDURE — 80307 DRUG TEST PRSMV CHEM ANLYZR: CPT | Performed by: EMERGENCY MEDICINE

## 2022-01-04 PROCEDURE — U0003 INFECTIOUS AGENT DETECTION BY NUCLEIC ACID (DNA OR RNA); SEVERE ACUTE RESPIRATORY SYNDROME CORONAVIRUS 2 (SARS-COV-2) (CORONAVIRUS DISEASE [COVID-19]), AMPLIFIED PROBE TECHNIQUE, MAKING USE OF HIGH THROUGHPUT TECHNOLOGIES AS DESCRIBED BY CMS-2020-01-R: HCPCS | Performed by: NURSE PRACTITIONER

## 2022-01-04 PROCEDURE — 82075 ASSAY OF BREATH ETHANOL: CPT | Performed by: EMERGENCY MEDICINE

## 2022-01-04 PROCEDURE — U0005 INFEC AGEN DETEC AMPLI PROBE: HCPCS | Performed by: NURSE PRACTITIONER

## 2022-01-04 PROCEDURE — 99285 EMERGENCY DEPT VISIT HI MDM: CPT | Performed by: EMERGENCY MEDICINE

## 2022-01-04 PROCEDURE — 0241U HB NFCT DS VIR RESP RNA 4 TRGT: CPT | Performed by: EMERGENCY MEDICINE

## 2022-01-04 RX ORDER — TOPIRAMATE 100 MG/1
100 TABLET, FILM COATED ORAL 2 TIMES DAILY
Status: DISCONTINUED | OUTPATIENT
Start: 2022-01-05 | End: 2022-01-05 | Stop reason: HOSPADM

## 2022-01-04 RX ORDER — BUPROPION HYDROCHLORIDE 150 MG/1
300 TABLET ORAL DAILY
Status: DISCONTINUED | OUTPATIENT
Start: 2022-01-05 | End: 2022-01-05 | Stop reason: HOSPADM

## 2022-01-04 RX ORDER — LEVOTHYROXINE SODIUM 0.07 MG/1
75 TABLET ORAL
Status: DISCONTINUED | OUTPATIENT
Start: 2022-01-05 | End: 2022-01-05 | Stop reason: HOSPADM

## 2022-01-04 RX ORDER — LANOLIN ALCOHOL/MO/W.PET/CERES
CREAM (GRAM) TOPICAL 3 TIMES DAILY PRN
Status: DISCONTINUED | OUTPATIENT
Start: 2022-01-04 | End: 2022-01-05 | Stop reason: HOSPADM

## 2022-01-04 RX ORDER — FUROSEMIDE 40 MG/1
20 TABLET ORAL DAILY
Status: DISCONTINUED | OUTPATIENT
Start: 2022-01-05 | End: 2022-01-05 | Stop reason: HOSPADM

## 2022-01-04 RX ORDER — LANOLIN ALCOHOL/MO/W.PET/CERES
3 CREAM (GRAM) TOPICAL
Status: DISCONTINUED | OUTPATIENT
Start: 2022-01-04 | End: 2022-01-05 | Stop reason: HOSPADM

## 2022-01-04 RX ORDER — LORAZEPAM 1 MG/1
1 TABLET ORAL ONCE
Status: COMPLETED | OUTPATIENT
Start: 2022-01-04 | End: 2022-01-04

## 2022-01-04 RX ORDER — VALPROIC ACID 250 MG/1
500 CAPSULE, LIQUID FILLED ORAL DAILY
Status: DISCONTINUED | OUTPATIENT
Start: 2022-01-05 | End: 2022-01-05 | Stop reason: HOSPADM

## 2022-01-04 RX ORDER — TRAZODONE HYDROCHLORIDE 50 MG/1
200 TABLET ORAL
Status: DISCONTINUED | OUTPATIENT
Start: 2022-01-05 | End: 2022-01-05 | Stop reason: HOSPADM

## 2022-01-04 RX ORDER — OLANZAPINE 5 MG/1
5 TABLET, ORALLY DISINTEGRATING ORAL ONCE
Status: COMPLETED | OUTPATIENT
Start: 2022-01-04 | End: 2022-01-04

## 2022-01-04 RX ORDER — LOSARTAN POTASSIUM 50 MG/1
50 TABLET ORAL DAILY
Status: DISCONTINUED | OUTPATIENT
Start: 2022-01-05 | End: 2022-01-05 | Stop reason: HOSPADM

## 2022-01-04 RX ADMIN — OLANZAPINE 5 MG: 5 TABLET, ORALLY DISINTEGRATING ORAL at 16:09

## 2022-01-04 RX ADMIN — Medication 3 MG: at 23:23

## 2022-01-04 RX ADMIN — LORAZEPAM 1 MG: 1 TABLET ORAL at 15:31

## 2022-01-04 RX ADMIN — METOPROLOL TARTRATE 25 MG: 25 TABLET, FILM COATED ORAL at 23:23

## 2022-01-04 NOTE — LETTER
Bety Henry [address] [place] [address]  Dept: [phone]      EMTALA TRANSFER CONSENT    NAME Edis Lujan                                         1970                              MRN [id number]    I have been informed of my rights regarding examination, treatment, and transfer   by Dr Yesenia Steinberg MD    Benefits: Specialized equipment and/or services available at the receiving facility (Include comment)________________________,Continuity of care,Other benefits (Include comment)_______________________ (inpatient mental health [place])    Risks: Potential for delay in receiving treatment,Potential deterioration of medical condition,Possible worsening of condition or death during transfer,Increased discomfort during transfer      Consent for Transfer:  I acknowledge that my medical condition has been evaluated and explained to me by the emergency department physician or other qualified medical person and/or my attending physician, who has recommended that I be transferred to the service of  Accepting Physician: Dr Rc Roth at [address] [place] [address] Rd Name, [address] : [facility]  The above potential benefits of such transfer, the potential risks associated with such transfer, and the probable risks of not being transferred have been explained to me, and I fully understand them  The doctor has explained that, in my case, the benefits of transfer outweigh the risks  I agree to be transferred  I authorize the performance of emergency medical procedures and treatments upon me in both transit and upon arrival at the receiving facility  Additionally, I authorize the release of any and all medical records to the receiving facility and request they be transported with me, if possible    I understand that the safest mode of transportation during a medical emergency is an ambulance and that the Hospital advocates the use of this mode of transport  Risks of traveling to the receiving facility by car, including absence of medical control, life sustaining equipment, such as oxygen, and medical personnel has been explained to me and I fully understand them  ([address] [place])  [  ]  I consent to the stated transfer and to be transported by ambulance/helicopter   [X]  I consent to the stated transfer, but refuse transportation by ambulance and accept full responsibility for my transportation by car  I understand the risks of non-ambulance transfers and I exonerate the Hospital and its staff from any deterioration in my condition that results from this refusal     X___________________________________________    DATE  22  TIME________  Signature of patient or legally responsible individual signing on patient behalf           RELATIONSHIP TO PATIENT_________________________            Provider Certification    NAME Nataliia Montejo                                        [facility] 1970                              MRN [id number]    A medical screening exam was performed on the above named patient  Based on the examination:    Condition Necessitating Transfer The primary encounter diagnosis was Schizoaffective disorder ([facility] [address] )  Diagnoses of Suicidal ideation, Medical clearance for psychiatric admission, Mixed hyperlipidemia, Benign essential hypertension, and Hypothyroidism were also pertinent to this visit      Patient Condition: The patient has been stabilized such that within reasonable medical probability, no material deterioration of the patient condition or the condition of the unborn child(jessica) is likely to result from the transfer    Reason for Transfer: Level of Care needed not available at this facility,No bed available at level of patient's needs,Other (Include comment)____________________ (inpatient mental health 201)    Transfer Requirements: [address], [place]   · Space available and qualified personnel available for treatment as acknowledged by Crisis [phone]  · Agreed to accept transfer and to provide appropriate medical treatment as acknowledged by       Dr Timo Moy  · Appropriate medical records of the examination and treatment of the patient are provided at the time of transfer   [facility], [address] _______  · Transfer will be performed by qualified personnel from    and appropriate transfer equipment as required, including the use of necessary and appropriate life support measures  Provider Certification: I have examined the patient and explained the following risks and benefits of being transferred/refusing transfer to the patient/family:  General risk, such as traffic hazards, adverse weather conditions, rough terrain or turbulence, possible failure of equipment (including vehicle or aircraft), or consequences of actions of persons outside the control of the transport personnel,Unanticipated needs of medical equipment and personnel during transport,Risk of worsening condition,The possibility of a transport vehicle being unavailable,The patient is stable for psychiatric transfer because they are medically stable, and is protected from harming him/herself or others during transport      Based on these reasonable risks and benefits to the patient and/or the unborn child(jessica), and based upon the information available at the time of the patients examination, I certify that the medical benefits reasonably to be expected from the provision of appropriate medical treatments at another medical facility outweigh the increasing risks, if any, to the individuals medical condition, and in the case of labor to the unborn child, from effecting the transfer      X____________________________________________ DATE 01/05/22        TIME_______      ORIGINAL - SEND TO MEDICAL RECORDS   COPY - SEND WITH PATIENT DURING TRANSFER

## 2022-01-04 NOTE — ED NOTES
Pt distressed at this time  Crying and banging head on wall  Demanding that her team is called and then she will calm down and cooperate  Pt threw a cup of water at the        Lazaro Lezama  01/04/22 3940       Milka Cameron  01/04/22 [address]  [facility] [place] Luis Enrique  01/04/22 2533

## 2022-01-04 NOTE — ED NOTES
Patient signed 61 [phone] [id number]  She is aware of her rights and 72 hour notice  Co-signed by Dr Roseanna Hansen, who was advised of current thoughts for self harm and request for ointment for abrasions as patient is reporting irritation and the areas are reddened  Patient also reports, "I have been picking at these" pointing the the areas on her wrists  Fresh bandages applied to help limit her urges to pick  Call to [facility], AN AFFILIATE OF [facility] with [facility]  She was advised that patient is here and we will be pursuing psychiatric admission  She voiced understanding and support  201 was faxed to Intake  Patient requested a call to her father, Rock Estes (cell: [phone]; home: [phone]) to request he come get her keys so he would be able to get her car from the [employer] parking lot in [facility]  Request was relayed to her father who voiced agreement and indicated that he would be coming to get her keys when he is able to

## 2022-01-04 NOTE — Clinical Note
Jemima Guzman was seen and treated in our emergency department on 1/4/2022  Diagnosis:     Toy     She may return on this date:     Patient was in [facility] ED from 1/4/22-1/5/22     If you have any questions or concerns, please don't hesitate to call        Angel Pierre MD    ______________________________           _______________          _______________  Hospital Representative                              Date                                Time

## 2022-01-04 NOTE — ED NOTES
Pt distressed due to room next door being covid positive  Offering patient to move to a different room in [facility]        Lazaro Lezama  01/04/22 225 [facility]  01/04/22 4464

## 2022-01-04 NOTE — LETTER
Betyjob Henry [address] [place] [id number]  Dept: [phone]      EMTALA TRANSFER CONSENT    NAME Ruma Killian                                         1970                              MRN [id number]    I have been informed of my rights regarding examination, treatment, and transfer   by Dr Juanis Reaves MD    Benefits: Specialized equipment and/or services available at the receiving facility (Include comment)________________________,Continuity of care,Other benefits (Include comment)_______________________ (inpatient mental health 61 51 81)    Risks: Potential for delay in receiving treatment,Potential deterioration of medical condition,Possible worsening of condition or death during transfer,Increased discomfort during transfer      Consent for Transfer:  I acknowledge that my medical condition has been evaluated and explained to me by the emergency department physician or other qualified medical person and/or my attending physician, who has recommended that I be transferred to the service of  Accepting Physician: Dr Jesus Alberto Ashton at [address] [place] [address] Name, [address] : [place], [place]  The above potential benefits of such transfer, the potential risks associated with such transfer, and the probable risks of not being transferred have been explained to me, and I fully understand them  The doctor has explained that, in my case, the benefits of transfer outweigh the risks  I agree to be transferred  I authorize the performance of emergency medical procedures and treatments upon me in both transit and upon arrival at the receiving facility  Additionally, I authorize the release of any and all medical records to the receiving facility and request they be transported with me, if possible    I understand that the safest mode of transportation during a medical emergency is an ambulance and that the Hospital advocates the use of this mode of transport  Risks of traveling to the receiving facility by car, including absence of medical control, life sustaining equipment, such as oxygen, and medical personnel has been explained to me and I fully understand them  (LONI CORRECT BOX BELOW)  [X]  I consent to the stated transfer and to be transported by ambulance/helicopter  [  ]  I consent to the stated transfer, but refuse transportation by ambulance and accept full responsibility for my transportation by car  I understand the risks of non-ambulance transfers and I exonerate the Hospital and its staff from any deterioration in my condition that results from this refusal     X___________________________________________    DATE  22  TIME________  Signature of patient or legally responsible individual signing on patient behalf           RELATIONSHIP TO PATIENT_________________________            Provider Certification    NAME Charly Samaniego                                         1970                              MRN [id number]    A medical screening exam was performed on the above named patient  Based on the examination:    Condition Necessitating Transfer The primary encounter diagnosis was Schizoaffective disorder ([facility] [address] )  Diagnoses of Suicidal ideation, Medical clearance for psychiatric admission, Mixed hyperlipidemia, Benign essential hypertension, and Hypothyroidism were also pertinent to this visit      Patient Condition: The patient has been stabilized such that within reasonable medical probability, no material deterioration of the patient condition or the condition of the unborn child(jessica) is likely to result from the transfer    Reason for Transfer: Level of Care needed not available at this facility,No bed available at level of patient's needs,Other (Include comment)____________________ (inpatient mental health 201)    Transfer Requirements: [address], [place]   · Space available and qualified personnel available for treatment as acknowledged by Crisis [phone]  · Agreed to accept transfer and to provide appropriate medical treatment as acknowledged by       Dr Rc Roth  · Appropriate medical records of the examination and treatment of the patient are provided at the time of transfer   [facility], [address] _______  · Transfer will be performed by qualified personnel from    and appropriate transfer equipment as required, including the use of necessary and appropriate life support measures  Provider Certification: I have examined the patient and explained the following risks and benefits of being transferred/refusing transfer to the patient/family:  General risk, such as traffic hazards, adverse weather conditions, rough terrain or turbulence, possible failure of equipment (including vehicle or aircraft), or consequences of actions of persons outside the control of the transport personnel,Unanticipated needs of medical equipment and personnel during transport,Risk of worsening condition,The possibility of a transport vehicle being unavailable,The patient is stable for psychiatric transfer because they are medically stable, and is protected from harming him/herself or others during transport      Based on these reasonable risks and benefits to the patient and/or the unborn child(jessica), and based upon the information available at the time of the patients examination, I certify that the medical benefits reasonably to be expected from the provision of appropriate medical treatments at another medical facility outweigh the increasing risks, if any, to the individuals medical condition, and in the case of labor to the unborn child, from effecting the transfer      X____________________________________________ DATE 01/05/22        TIME_______      ORIGINAL - SEND TO MEDICAL RECORDS   COPY - SEND WITH PATIENT DURING TRANSFER

## 2022-01-04 NOTE — ED PROVIDER NOTES
History  Chief Complaint   Patient presents with    Suicidal     Pt presents to the ED with c/o SI  Pt has superficial cuts on her right and left wrist, inner right and left thigh  Pt explains she did this with a blade, only c/o right wrist hurting  Pt went missing and police were called  She reports she then did call someone to let everyone know where she was so she could get help  Patient is a 55-year-old female with a history of a bipolar disorder and history of self-injury who presents with suicidal ideations  Patient states that she is tired of being treated poorly by her providers  She states that her psychiatrist, social workers and PCP are not responsive to her needs  She states that she was having symptoms of URI last week and got a COVID test   She was told by the lab that her test was lost  She was having difficulty getting another order for testing by her PCP, but she finally was retested this morning  She was also texting her psychiatric providers but did not feel that this was enough given her increasing depression and anxiety  She states that she is tired of it all  She had thoughts today of hurting herself  She did cut her bilateral forearms and bilateral thighs with a knife  Her plan was to continue cutting  She denies any homicidal ideations  She denies any visual or auditory hallucinations  History provided by:  Patient  Suicidal  Presenting symptoms: depression, self-mutilation and suicidal thoughts    Progression:  Unchanged  Chronicity:  Recurrent  Treatment compliance: All of the time  Time since last psychoactive medication taken:  1 day  Associated symptoms: anxiety and feelings of worthlessness    Associated symptoms: no abdominal pain, no chest pain and no headaches        Prior to Admission Medications   Prescriptions Last Dose Informant Patient Reported? Taking?    COLESTIPOL HCL PO 1/3/2022 at Unknown time Self Yes Yes   Sig: Take 1 g by mouth 2 (two) times a day   Cholecalciferol (Vitamin D3) 125 MCG (5000 UT) TABS 1/3/2022 at Unknown time Self Yes Yes   Sig: Take 5,000 Units by mouth daily   Lurasidone HCl (LATUDA PO) 1/3/2022 at Unknown time Self Yes Yes   Sig: Take 60 mg by mouth daily at bedtime     Toviaz 8 MG TB24 1/3/2022 at Unknown time  No Yes   Sig: TAKE 1 TABLET (8 MG TOTAL) BY MOUTH DAILY   Patient taking differently: Take 4 mg by mouth daily     albuterol (PROVENTIL HFA,VENTOLIN HFA) 90 mcg/act inhaler 1/3/2022 at Unknown time Self Yes Yes   Sig: Inhale 2 puffs every 6 (six) hours as needed for wheezing   buPROPion (WELLBUTRIN XL) 300 mg 24 hr tablet 1/3/2022 at Unknown time Self Yes Yes   Sig: Take 300 mg by mouth daily   divalproex sodium (DEPAKOTE ER) 500 mg 24 hr tablet 1/3/2022 at Unknown time  No Yes   Sig: Take 1 tablet (500 mg total) by mouth daily   estradiol (ESTRACE) 1 mg tablet 1/3/2022 at Unknown time Self No Yes   Sig: Take 1 tablet (1 mg total) by mouth daily   furosemide (LASIX) 20 mg tablet 1/3/2022 at Unknown time  No Yes   Sig: Take 1 tablet (20 mg total) by mouth daily   haloperidol decanoate (Haldol Decanoate) 50 mg/mL injection 1/3/2022 at Unknown time Self Yes Yes   Sig: Inject 50 mg into a muscle every 30 (thirty) days   levothyroxine 75 mcg tablet 1/3/2022 at Unknown time Self No Yes   Sig: Take 1 tablet (75 mcg total) by mouth daily   losartan (COZAAR) 50 mg tablet 1/3/2022 at Unknown time Self Yes Yes   Sig: Take 50 mg by mouth daily   melatonin 3 mg 1/3/2022 at Unknown time  Yes Yes   Sig:     metoprolol tartrate (LOPRESSOR) 25 mg tablet 1/3/2022 at Unknown time  Yes Yes   Sig: Take 25 mg by mouth every 12 (twelve) hours     topiramate (TOPAMAX) 100 mg tablet 1/3/2022 at Unknown time Self Yes Yes   Sig: Take 100 mg by mouth 2 (two) times a day   traZODone (DESYREL) 100 mg tablet 1/3/2022 at Unknown time Self Yes Yes   Sig: Take 200 mg by mouth daily at bedtime       Facility-Administered Medications: None       Past Medical History:   Diagnosis Date    Anxiety     Arthritis     oa cassandra knees    Asthma     good control- no medications    Yan's esophagus     Bipolar affective disorder (HCC)     Chronic pain disorder     lumbar    CPAP (continuous positive airway pressure) dependence     Degenerative disc disease at L5-S1 level     Deliberate self-cutting     Depression 09/16/2008    Disease of thyroid gland     hypo    MARTINEZ (dyspnea on exertion)     Drug overdose 10/28/2008    suicide attempt    Dysphagia     Dyspnea     Edema     BLE    GERD (gastroesophageal reflux disease)     High blood pressure     History of COVID-19 12/30/2020    Symptoms started on 12/30/2020 and then got worse  Today she is feeling a little bit better  She is a candidate for monoclonal antibody infusion and set for 1/6/21 @ 1pm at [facility]  01/07/21 - telemedicine -     Hypertension     Knee pain, bilateral     Especially right    Migraines     Obese     Overactive bladder     Sjogren's disease ([facility] [address] )     Sleep apnea     Stress incontinence     Suicidal ideations     Umbilical hernia     surgical repair today 4/24/2019    Use of cane as ambulatory aid     awaiting b/l knee replacement    Wears glasses        Past Surgical History:   Procedure Laterality Date    BREAST BIOPSY Right 1989    benign    CARPAL TUNNEL RELEASE Left     CHOLECYSTECTOMY  05/2003    Laparoscopic    COLONOSCOPY      01/12/2009    DILATION AND CURETTAGE OF UTERUS      ELBOW SURGERY Left     x2   No hardware    ESOPHAGOGASTRODUODENOSCOPY      FOOT SURGERY Left     Plantar fasciotomy    HYSTERECTOMY      laporoscopic, partial    KNEE ARTHROSCOPY Bilateral     OOPHORECTOMY Left 10/2015    CT ANAL SPHINCTEROTOMY N/A 8/31/2018    Procedure: EUA, LEFT PARTIAL INTERNAL SPHINCTEROTOMY;  Surgeon: Adryan Peres MD;  Location: [address] [place] [address];  Service: Colorectal    CT GASTROCNEMIUS RECESSION Left 2/24/2021    Procedure: RECESSION GASTROC OPEN;  Surgeon: Daisy Rios DPM;  Location: [address] [place] [address] MAIN OR;  Service: Podiatry    MT REPAIR UMBILICAL KJCF,7+H/Y,DBANK N/A 2019    Procedure: REPAIR HERNIA UMBILICAL LAPAROSCOPIC W/ ROBOTICS;  Surgeon: Heidi Bliss MD;  Location: [facility] OR;  Service: General    REDUCTION MAMMAPLASTY Bilateral 1999    REPAIR LACERATION Left     left hand-2009    ROTATOR CUFF REPAIR Left     TONSILECTOMY AND ADNOIDECTOMY      TONSILLECTOMY      US GUIDED MSK PROCEDURE  2021    VEIN LIGATION Bilateral     WISDOM TOOTH EXTRACTION         Family History   Problem Relation Age of Onset    Kidney cancer Mother     Diabetes Mother     Colon cancer Family     No Known Problems Father     No Known Problems Sister     Colon cancer Paternal Uncle     Colon cancer Maternal Uncle     Colon cancer Maternal Uncle      I have reviewed and agree with the history as documented  E-Cigarette/Vaping    E-Cigarette Use Current Some Day User     Comments medical THC      E-Cigarette/Vaping Substances    Nicotine No     THC Yes     CBD No     Flavoring No     Other No     Unknown No      Social History     Tobacco Use    Smoking status: Former Smoker     Packs/day: 3 00     Types: Cigarettes     Quit date: 2018     Years since quittin 0    Smokeless tobacco: Never Used    Tobacco comment: Started at age 14  Vaping Use    Vaping Use: Some days    Substances: THC   Substance Use Topics    Alcohol use: Not Currently     Comment: Recovering alcoholic    Drug use: Yes     Types: Marijuana, Methamphetamines     Comment: medical- vapes 3 times per wk at        Review of Systems   Constitutional: Negative for chills, diaphoresis and fever  HENT: Negative for nosebleeds, sore throat and trouble swallowing  Eyes: Negative for photophobia, pain and visual disturbance  Respiratory: Negative for cough, chest tightness and shortness of breath      Cardiovascular: Negative for chest pain, palpitations and leg swelling  Gastrointestinal: Negative for abdominal pain, constipation, diarrhea, nausea and vomiting  Endocrine: Negative for polydipsia and polyuria  Genitourinary: Negative for difficulty urinating, dysuria, hematuria, pelvic pain, vaginal bleeding and vaginal discharge  Musculoskeletal: Negative for back pain, neck pain and neck stiffness  Skin: Negative for pallor and rash  Neurological: Negative for dizziness, seizures, light-headedness and headaches  Psychiatric/Behavioral: Positive for self-injury and suicidal ideas  The patient is nervous/anxious  All other systems reviewed and are negative  Physical Exam  Physical Exam  Vitals and nursing note reviewed  Constitutional:       General: She is not in acute distress  Appearance: She is well-developed  HENT:      Head: Normocephalic and atraumatic  Eyes:      Pupils: Pupils are equal, round, and reactive to light  Cardiovascular:      Rate and Rhythm: Normal rate and regular rhythm  Pulses: Normal pulses  Heart sounds: Normal heart sounds  Pulmonary:      Effort: Pulmonary effort is normal  No respiratory distress  Breath sounds: Normal breath sounds  Abdominal:      General: There is no distension  Palpations: Abdomen is soft  Abdomen is not rigid  Tenderness: There is no abdominal tenderness  There is no guarding or rebound  Musculoskeletal:         General: No tenderness  Normal range of motion  Cervical back: Normal range of motion and neck supple  Lymphadenopathy:      Cervical: No cervical adenopathy  Skin:     General: Skin is warm and dry  Capillary Refill: Capillary refill takes less than 2 seconds  Neurological:      Mental Status: She is alert and oriented to person, place, and time  Cranial Nerves: No cranial nerve deficit  Sensory: No sensory deficit  Psychiatric:         Mood and Affect: Mood is anxious and depressed  Thought Content:  Thought content includes suicidal ideation  Thought content does not include homicidal ideation  Vital Signs  ED Triage Vitals [01/04/22 1335]   Temperature Pulse Respirations Blood Pressure SpO2   98 6 °F (37 °C) 92 16 (!) 172/79 92 %      Temp Source Heart Rate Source Patient Position - Orthostatic VS BP Location FiO2 (%)   Oral Monitor -- Right arm --      Pain Score       --           Vitals:    01/04/22 1335 01/04/22 2054   BP: (!) 172/79 126/66   Pulse: 92 88         Visual Acuity      ED Medications  Medications   white petrolatum-mineral oil (EUCERIN,HYDROCERIN) cream (has no administration in time range)   LORazepam (ATIVAN) tablet 1 mg (1 mg Oral Given 1/4/22 1531)   OLANZapine (ZyPREXA ZYDIS) dispersible tablet 5 mg (5 mg Oral Given 1/4/22 1609)       Diagnostic Studies  Results Reviewed     Procedure Component Value Units Date/Time    Rapid drug screen, urine [[id number]]  (Abnormal) Collected: 01/04/22 1504    Lab Status: Final result Specimen: Urine, Clean Catch Updated: 01/04/22 1528     Amph/Meth UR Positive     Barbiturate Ur Negative     Benzodiazepine Urine Negative     Cocaine Urine Negative     Methadone Urine Negative     Opiate Urine Negative     PCP Ur Negative     THC Urine Positive     Oxycodone Urine Negative    Narrative:      Presumptive report  If requested, specimen will be sent to reference lab for confirmation  FOR MEDICAL PURPOSES ONLY  IF CONFIRMATION NEEDED PLEASE CONTACT THE LAB WITHIN 5 DAYS      Drug Screen Cutoff Levels:  AMPHETAMINE/METHAMPHETAMINES  1000 ng/mL  BARBITURATES     200 ng/mL  BENZODIAZEPINES     200 ng/mL  COCAINE      300 ng/mL  METHADONE      300 ng/mL  OPIATES      300 ng/mL  PHENCYCLIDINE     25 ng/mL  THC       50 ng/mL  OXYCODONE      100 ng/mL    COVID/FLU/RSV - 2 hour TAT [[id number]]  (Normal) Collected: 01/04/22 1419    Lab Status: Final result Specimen: Nares from Nasopharyngeal Swab Updated: 01/04/22 1524     SARS-CoV-2 Negative     INFLUENZA A PCR Negative     INFLUENZA B PCR Negative     RSV PCR Negative    Narrative:      FOR PEDIATRIC PATIENTS - copy/paste COVID Guidelines URL to browser: [email or web address]    SARS-CoV-2 assay is a Nucleic Acid Amplification assay intended for the  qualitative detection of nucleic acid from SARS-CoV-2 in nasopharyngeal  swabs  Results are for the presumptive identification of SARS-CoV-2 RNA  Positive results are indicative of infection with SARS-CoV-2, the virus  causing COVID-19, but do not rule out bacterial infection or co-infection  with other viruses  Laboratories within the [place] and its  territories are required to report all positive results to the appropriate  public health authorities  Negative results do not preclude SARS-CoV-2  infection and should not be used as the sole basis for treatment or other  patient management decisions  Negative results must be combined with  clinical observations, patient history, and epidemiological information  This test has not been FDA cleared or approved  This test has been authorized by FDA under an Emergency Use Authorization  (EUA)  This test is only authorized for the duration of time the  declaration that circumstances exist justifying the authorization of the  emergency use of an in vitro diagnostic tests for detection of SARS-CoV-2  virus and/or diagnosis of COVID-19 infection under section 564(b)(1) of  the Act, 21 U  S C  191ELO-0(M)(3), unless the authorization is terminated  or revoked sooner  The test has been validated but independent review by FDA  and CLIA is pending  Test performed using [employer] GeneXpert: This RT-PCR assay targets N2,  a region unique to SARS-CoV-2  A conserved region in the E-gene was chosen  for pan-Sarbecovirus detection which includes SARS-CoV-2      POCT alcohol breath test [[id number]]  (Normal) Resulted: 01/04/22 1415    Lab Status: Final result Updated: 01/04/22 1415 EXTBreath Alcohol 0 000                 No orders to display              Procedures  Procedures         ED Course  ED Course as of 01/04/22 2246   Tue Jan 04, 2022   1531 Patient signed 529   4455 Patient calm and resting in bed at this time  MDM  Number of Diagnoses or Management Options  Schizoaffective disorder ([facility] )  Suicidal ideation: new and requires workup  Diagnosis management comments: Patient with a history of psychiatric disorder presents with suicidal ideations  She has multiple superficial abrasions to bilateral forearms and thighs  Patient was evaluated by crisis and has signed a 201  Will look for placement  Patient is medically stable for inpatient psychiatric treatment  Portions of the above record have been created with voice recognition software   Occasional wrong word or "sound alike" substitutions may have occurred due to the inherent limitations of voice recognition software   Read the chart carefully and recognize, using context, where substitutions may have occurred           Amount and/or Complexity of Data Reviewed  Clinical lab tests: ordered and reviewed  Tests in the medicine section of CPT®: ordered and reviewed  Review and summarize past medical records: yes    Risk of Complications, Morbidity, and/or Mortality  Presenting problems: moderate  Diagnostic procedures: low  Management options: moderate    Patient Progress  Patient progress: stable      Disposition  Final diagnoses:   Schizoaffective disorder ([facility] )   Suicidal ideation     Time reflects when diagnosis was documented in both MDM as applicable and the Disposition within this note     Time User Action Codes Description Comment    1/4/2022 10:46 PM Stephanie Yost [F25 [id number]] Schizoaffective disorder ([facility] )     1/4/2022 10:46 PM Stephanie Yost [[id number]] Suicidal ideation       ED Disposition     None      MD Documentation      Most Recent Value   Sending MD Regan Flowers Rafael      Follow-up Information    None         Patient's Medications   Discharge Prescriptions    No medications on file       No discharge procedures on file      PDMP Review       Value Time User    PDMP Reviewed  Yes 6/16/2021  3:07 PM Katy Jenkins MD          ED Provider  Electronically Signed by           Lefi Sr DO  01/04/22 9626

## 2022-01-04 NOTE — ED NOTES
Patient became very upset when she found out another person in the psych unit has covid  Pt  Started crying and acting out by hitting the wall  Pt  Was moved to a room further away from the covid patient and was given apple juice  Notified provider       Andrea Quezada RN  01/04/22 0033

## 2022-01-04 NOTE — ED NOTES
Patient is a 49 yr old female who is well known to this examiner from many previous ED visits  Select labs are pending, but she is otherwise medically clear  She arrives via EMS with [facility] calling in advance of her arrival, reporting that today, she reached out to her ACT team and was also frustrated about not getting COVID results to allow her to return to work  When her ACT team was not readily responsive, she made a vague threat of harm to self and then stopped responding  Welfare check was initiated, but patient could not be located at home and she was not answering her phone  Police did go out looking for her  Hours later she called to report she was in a [employer] parking lot and had cut her wrists  Patient arrived with superficial cuts / abrasions approximately 1 square inch each on wrists bilaterally  She reports similar marks on her thighs  No need for medical intervention beyond a bandaid  She is selectively mute, refusing to respond and covering her face at times  She is tearful  She reports she is "just sick of it all"  She was tested for covid on 12/30 after secondary exposure and mild symptoms, and was unable to return to work without a negative test   However, the lab lost her specimen and her reports that her PCP was not responsive to her calls (chart notes indicate she called several times and attempts were made to meet her needs)  She reports that ultimately, she needed another test this morning and does not know the results and is unable to return to work due to this  Patient has a very low frustration tolerance and difficulty delay gratification of wants and needs  She admits that she didn't want to live anymore and cut herself intentionally with intent to "keep cutting"  She confirms that she did want to kill herself  She has history of self-inflicted cutting, suicidal threats and similar behaviors  The patient denies homicidal ideas, plan, intent    When asked about auditory and visual hallucinations, she was hesitant to respond, but then said, "I don't know anymore"  No overt indication that she is responding to internal stimuli and no overt expression of delusional thinking or paranoia  Patient does present as depressed and anxious and endorses same with no details  She reports fluctuations is both sleep and appetite with mild unknown weight loss  There is a long history of mental health issues and Axis II features  She is supported by [facility] ACT team, and is in contact with them several times per week  Stressors include loss of her mother in August and difficulty adjusting to same, recent stressors related to covid testing complications and inability to work, and delay in response of ACT team   Patient reports she is generally compliant with medications, but did not take them since yesterday morning  ED physician is aware  Select labs pending at present

## 2022-01-05 ENCOUNTER — HOSPITAL ENCOUNTER (INPATIENT)
Facility: HOSPITAL | Age: 52
LOS: 5 days | Discharge: HOME | DRG: 885 | End: 2022-01-10
Attending: STUDENT IN AN ORGANIZED HEALTH CARE EDUCATION/TRAINING PROGRAM | Admitting: STUDENT IN AN ORGANIZED HEALTH CARE EDUCATION/TRAINING PROGRAM
Payer: MEDICARE

## 2022-01-05 VITALS
RESPIRATION RATE: 18 BRPM | SYSTOLIC BLOOD PRESSURE: 133 MMHG | TEMPERATURE: 98 F | OXYGEN SATURATION: 93 % | DIASTOLIC BLOOD PRESSURE: 81 MMHG | HEART RATE: 72 BPM

## 2022-01-05 DIAGNOSIS — E78.2 MIXED HYPERLIPIDEMIA: ICD-10-CM

## 2022-01-05 DIAGNOSIS — K90.9 DIARRHEA DUE TO MALABSORPTION: ICD-10-CM

## 2022-01-05 DIAGNOSIS — K21.9 GERD (GASTROESOPHAGEAL REFLUX DISEASE): ICD-10-CM

## 2022-01-05 DIAGNOSIS — J98.4 RESTRICTIVE LUNG DISEASE: ICD-10-CM

## 2022-01-05 DIAGNOSIS — R19.7 DIARRHEA DUE TO MALABSORPTION: ICD-10-CM

## 2022-01-05 DIAGNOSIS — E03.9 HYPOTHYROIDISM: ICD-10-CM

## 2022-01-05 DIAGNOSIS — F41.9 ANXIETY: ICD-10-CM

## 2022-01-05 DIAGNOSIS — I10 BENIGN ESSENTIAL HYPERTENSION: ICD-10-CM

## 2022-01-05 DIAGNOSIS — Z00.8 MEDICAL CLEARANCE FOR PSYCHIATRIC ADMISSION: ICD-10-CM

## 2022-01-05 DIAGNOSIS — G47.00 INSOMNIA: ICD-10-CM

## 2022-01-05 DIAGNOSIS — F25.0 SCHIZOAFFECTIVE DISORDER, BIPOLAR TYPE (HCC): Primary | Chronic | ICD-10-CM

## 2022-01-05 DIAGNOSIS — I10 HYPERTENSION, UNSPECIFIED TYPE: ICD-10-CM

## 2022-01-05 PROCEDURE — 96372 THER/PROPH/DIAG INJ SC/IM: CPT

## 2022-01-05 RX ORDER — HALOPERIDOL 5 MG/ML
5 INJECTION INTRAMUSCULAR
Status: DISCONTINUED | OUTPATIENT
Start: 2022-01-05 | End: 2022-01-10 | Stop reason: HOSPADM

## 2022-01-05 RX ORDER — BENZTROPINE MESYLATE 1 MG/ML
1 INJECTION INTRAMUSCULAR; INTRAVENOUS
Status: DISCONTINUED | OUTPATIENT
Start: 2022-01-05 | End: 2022-01-10 | Stop reason: HOSPADM

## 2022-01-05 RX ORDER — BUPROPION HYDROCHLORIDE 300 MG/1
300 TABLET ORAL DAILY
Status: DISCONTINUED | OUTPATIENT
Start: 2022-01-06 | End: 2022-01-10 | Stop reason: HOSPADM

## 2022-01-05 RX ORDER — LOSARTAN POTASSIUM 50 MG/1
50 TABLET ORAL DAILY
Status: DISCONTINUED | OUTPATIENT
Start: 2022-01-06 | End: 2022-01-10 | Stop reason: HOSPADM

## 2022-01-05 RX ORDER — LORAZEPAM 2 MG/ML
1 INJECTION INTRAMUSCULAR
Status: CANCELLED | OUTPATIENT
Start: 2022-01-05

## 2022-01-05 RX ORDER — HALOPERIDOL 2 MG/1
2 TABLET ORAL
Status: DISCONTINUED | OUTPATIENT
Start: 2022-01-05 | End: 2022-01-10 | Stop reason: HOSPADM

## 2022-01-05 RX ORDER — IBUPROFEN 400 MG/1
400 TABLET ORAL EVERY 4 HOURS PRN
Status: CANCELLED | OUTPATIENT
Start: 2022-01-05

## 2022-01-05 RX ORDER — HYDROXYZINE HYDROCHLORIDE 25 MG/1
100 TABLET, FILM COATED ORAL
Status: CANCELLED | OUTPATIENT
Start: 2022-01-05

## 2022-01-05 RX ORDER — BENZTROPINE MESYLATE 1 MG/ML
1 INJECTION INTRAMUSCULAR; INTRAVENOUS
Status: CANCELLED | OUTPATIENT
Start: 2022-01-05

## 2022-01-05 RX ORDER — LORAZEPAM 2 MG/ML
2 INJECTION INTRAMUSCULAR EVERY 6 HOURS PRN
Status: CANCELLED | OUTPATIENT
Start: 2022-01-05

## 2022-01-05 RX ORDER — IBUPROFEN 600 MG/1
600 TABLET ORAL EVERY 6 HOURS PRN
Status: DISCONTINUED | OUTPATIENT
Start: 2022-01-05 | End: 2022-01-10 | Stop reason: HOSPADM

## 2022-01-05 RX ORDER — LORAZEPAM 2 MG/ML
2 INJECTION INTRAMUSCULAR
Status: CANCELLED | OUTPATIENT
Start: 2022-01-05

## 2022-01-05 RX ORDER — VALPROIC ACID 250 MG/1
500 CAPSULE, LIQUID FILLED ORAL DAILY
Status: CANCELLED | OUTPATIENT
Start: 2022-01-05

## 2022-01-05 RX ORDER — AMOXICILLIN 250 MG
1 CAPSULE ORAL DAILY PRN
Status: CANCELLED | OUTPATIENT
Start: 2022-01-05

## 2022-01-05 RX ORDER — LORAZEPAM 2 MG/ML
1 INJECTION INTRAMUSCULAR
Status: DISCONTINUED | OUTPATIENT
Start: 2022-01-05 | End: 2022-01-10 | Stop reason: HOSPADM

## 2022-01-05 RX ORDER — TRAZODONE HYDROCHLORIDE 50 MG/1
50 TABLET ORAL
Status: CANCELLED | OUTPATIENT
Start: 2022-01-05

## 2022-01-05 RX ORDER — TRAZODONE HYDROCHLORIDE 50 MG/1
50 TABLET ORAL
Status: DISCONTINUED | OUTPATIENT
Start: 2022-01-05 | End: 2022-01-10 | Stop reason: HOSPADM

## 2022-01-05 RX ORDER — BISACODYL 10 MG
10 SUPPOSITORY, RECTAL RECTAL DAILY PRN
Status: CANCELLED | OUTPATIENT
Start: 2022-01-05

## 2022-01-05 RX ORDER — MINERAL OIL AND PETROLATUM 150; 830 MG/G; MG/G
1 OINTMENT OPHTHALMIC
Status: DISCONTINUED | OUTPATIENT
Start: 2022-01-05 | End: 2022-01-10 | Stop reason: HOSPADM

## 2022-01-05 RX ORDER — LORAZEPAM 2 MG/ML
2 INJECTION INTRAMUSCULAR
Status: DISCONTINUED | OUTPATIENT
Start: 2022-01-05 | End: 2022-01-10 | Stop reason: HOSPADM

## 2022-01-05 RX ORDER — OLANZAPINE 5 MG/1
5 TABLET, ORALLY DISINTEGRATING ORAL ONCE
Status: COMPLETED | OUTPATIENT
Start: 2022-01-05 | End: 2022-01-05

## 2022-01-05 RX ORDER — BENZTROPINE MESYLATE 1 MG/ML
0.5 INJECTION INTRAMUSCULAR; INTRAVENOUS
Status: CANCELLED | OUTPATIENT
Start: 2022-01-05

## 2022-01-05 RX ORDER — HALOPERIDOL 5 MG
5 TABLET ORAL
Status: DISCONTINUED | OUTPATIENT
Start: 2022-01-05 | End: 2022-01-10 | Stop reason: HOSPADM

## 2022-01-05 RX ORDER — IBUPROFEN 400 MG/1
400 TABLET ORAL EVERY 4 HOURS PRN
Status: DISCONTINUED | OUTPATIENT
Start: 2022-01-05 | End: 2022-01-10 | Stop reason: HOSPADM

## 2022-01-05 RX ORDER — LANOLIN ALCOHOL/MO/W.PET/CERES
3 CREAM (GRAM) TOPICAL
Status: CANCELLED | OUTPATIENT
Start: 2022-01-05

## 2022-01-05 RX ORDER — DIPHENHYDRAMINE HYDROCHLORIDE 50 MG/ML
50 INJECTION INTRAMUSCULAR; INTRAVENOUS EVERY 6 HOURS PRN
Status: CANCELLED | OUTPATIENT
Start: 2022-01-05

## 2022-01-05 RX ORDER — IBUPROFEN 400 MG/1
800 TABLET ORAL EVERY 8 HOURS PRN
Status: CANCELLED | OUTPATIENT
Start: 2022-01-05

## 2022-01-05 RX ORDER — BISACODYL 10 MG
10 SUPPOSITORY, RECTAL RECTAL DAILY PRN
Status: DISCONTINUED | OUTPATIENT
Start: 2022-01-05 | End: 2022-01-10 | Stop reason: HOSPADM

## 2022-01-05 RX ORDER — TOPIRAMATE 100 MG/1
100 TABLET, FILM COATED ORAL 2 TIMES DAILY
Status: CANCELLED | OUTPATIENT
Start: 2022-01-05

## 2022-01-05 RX ORDER — DIPHENHYDRAMINE HYDROCHLORIDE 50 MG/ML
50 INJECTION INTRAMUSCULAR; INTRAVENOUS EVERY 6 HOURS PRN
Status: DISCONTINUED | OUTPATIENT
Start: 2022-01-05 | End: 2022-01-10 | Stop reason: HOSPADM

## 2022-01-05 RX ORDER — FUROSEMIDE 40 MG/1
20 TABLET ORAL DAILY
Status: CANCELLED | OUTPATIENT
Start: 2022-01-05

## 2022-01-05 RX ORDER — IBUPROFEN 600 MG/1
600 TABLET ORAL EVERY 6 HOURS PRN
Status: CANCELLED | OUTPATIENT
Start: 2022-01-05

## 2022-01-05 RX ORDER — MAGNESIUM HYDROXIDE/ALUMINUM HYDROXICE/SIMETHICONE 120; 1200; 1200 MG/30ML; MG/30ML; MG/30ML
30 SUSPENSION ORAL EVERY 4 HOURS PRN
Status: CANCELLED | OUTPATIENT
Start: 2022-01-05

## 2022-01-05 RX ORDER — BENZTROPINE MESYLATE 1 MG/1
1 TABLET ORAL
Status: DISCONTINUED | OUTPATIENT
Start: 2022-01-05 | End: 2022-01-10 | Stop reason: HOSPADM

## 2022-01-05 RX ORDER — POLYETHYLENE GLYCOL 3350 17 G/17G
17 POWDER, FOR SOLUTION ORAL DAILY PRN
Status: CANCELLED | OUTPATIENT
Start: 2022-01-05

## 2022-01-05 RX ORDER — AMOXICILLIN 250 MG
1 CAPSULE ORAL DAILY PRN
Status: DISCONTINUED | OUTPATIENT
Start: 2022-01-05 | End: 2022-01-09

## 2022-01-05 RX ORDER — HYDROXYZINE HYDROCHLORIDE 25 MG/1
25 TABLET, FILM COATED ORAL
Status: CANCELLED | OUTPATIENT
Start: 2022-01-05

## 2022-01-05 RX ORDER — FUROSEMIDE 20 MG/1
20 TABLET ORAL DAILY
Status: DISCONTINUED | OUTPATIENT
Start: 2022-01-06 | End: 2022-01-10 | Stop reason: HOSPADM

## 2022-01-05 RX ORDER — LOSARTAN POTASSIUM 50 MG/1
50 TABLET ORAL DAILY
Status: CANCELLED | OUTPATIENT
Start: 2022-01-05

## 2022-01-05 RX ORDER — HYDROXYZINE HYDROCHLORIDE 25 MG/1
25 TABLET, FILM COATED ORAL
Status: DISCONTINUED | OUTPATIENT
Start: 2022-01-05 | End: 2022-01-10 | Stop reason: HOSPADM

## 2022-01-05 RX ORDER — MAGNESIUM HYDROXIDE/ALUMINUM HYDROXICE/SIMETHICONE 120; 1200; 1200 MG/30ML; MG/30ML; MG/30ML
30 SUSPENSION ORAL EVERY 4 HOURS PRN
Status: DISCONTINUED | OUTPATIENT
Start: 2022-01-05 | End: 2022-01-10 | Stop reason: HOSPADM

## 2022-01-05 RX ORDER — LORAZEPAM 2 MG/ML
2 INJECTION INTRAMUSCULAR EVERY 6 HOURS PRN
Status: DISCONTINUED | OUTPATIENT
Start: 2022-01-05 | End: 2022-01-10 | Stop reason: HOSPADM

## 2022-01-05 RX ORDER — BENZTROPINE MESYLATE 0.5 MG/1
1 TABLET ORAL
Status: CANCELLED | OUTPATIENT
Start: 2022-01-05

## 2022-01-05 RX ORDER — BUPROPION HYDROCHLORIDE 150 MG/1
300 TABLET ORAL DAILY
Status: CANCELLED | OUTPATIENT
Start: 2022-01-05

## 2022-01-05 RX ORDER — LEVOTHYROXINE SODIUM 0.07 MG/1
75 TABLET ORAL
Status: CANCELLED | OUTPATIENT
Start: 2022-01-06

## 2022-01-05 RX ORDER — POLYETHYLENE GLYCOL 3350 17 G/17G
17 POWDER, FOR SOLUTION ORAL DAILY PRN
Status: DISCONTINUED | OUTPATIENT
Start: 2022-01-05 | End: 2022-01-10 | Stop reason: HOSPADM

## 2022-01-05 RX ORDER — LORAZEPAM 2 MG/ML
1 INJECTION INTRAMUSCULAR ONCE
Status: COMPLETED | OUTPATIENT
Start: 2022-01-05 | End: 2022-01-05

## 2022-01-05 RX ORDER — VALPROIC ACID 250 MG/1
500 CAPSULE, LIQUID FILLED ORAL DAILY
Status: DISCONTINUED | OUTPATIENT
Start: 2022-01-06 | End: 2022-01-06

## 2022-01-05 RX ORDER — HALOPERIDOL 5 MG
5 TABLET ORAL
Status: CANCELLED | OUTPATIENT
Start: 2022-01-05

## 2022-01-05 RX ORDER — TRAZODONE HYDROCHLORIDE 100 MG/1
200 TABLET ORAL
Status: DISCONTINUED | OUTPATIENT
Start: 2022-01-05 | End: 2022-01-08

## 2022-01-05 RX ORDER — IBUPROFEN 800 MG/1
800 TABLET ORAL EVERY 8 HOURS PRN
Status: DISCONTINUED | OUTPATIENT
Start: 2022-01-05 | End: 2022-01-10 | Stop reason: HOSPADM

## 2022-01-05 RX ORDER — HALOPERIDOL 5 MG/ML
2.5 INJECTION INTRAMUSCULAR
Status: DISCONTINUED | OUTPATIENT
Start: 2022-01-05 | End: 2022-01-10 | Stop reason: HOSPADM

## 2022-01-05 RX ORDER — TRAZODONE HYDROCHLORIDE 50 MG/1
200 TABLET ORAL
Status: CANCELLED | OUTPATIENT
Start: 2022-01-05

## 2022-01-05 RX ORDER — LANOLIN ALCOHOL/MO/W.PET/CERES
3 CREAM (GRAM) TOPICAL
Status: DISCONTINUED | OUTPATIENT
Start: 2022-01-05 | End: 2022-01-10 | Stop reason: HOSPADM

## 2022-01-05 RX ORDER — BENZTROPINE MESYLATE 1 MG/ML
0.5 INJECTION INTRAMUSCULAR; INTRAVENOUS
Status: DISCONTINUED | OUTPATIENT
Start: 2022-01-05 | End: 2022-01-10 | Stop reason: HOSPADM

## 2022-01-05 RX ORDER — HALOPERIDOL 5 MG/ML
5 INJECTION INTRAMUSCULAR
Status: CANCELLED | OUTPATIENT
Start: 2022-01-05

## 2022-01-05 RX ORDER — MINERAL OIL AND PETROLATUM 150; 830 MG/G; MG/G
1 OINTMENT OPHTHALMIC
Status: CANCELLED | OUTPATIENT
Start: 2022-01-05

## 2022-01-05 RX ORDER — HYDROXYZINE HYDROCHLORIDE 25 MG/1
50 TABLET, FILM COATED ORAL
Status: CANCELLED | OUTPATIENT
Start: 2022-01-05

## 2022-01-05 RX ORDER — HYDROXYZINE 50 MG/1
100 TABLET, FILM COATED ORAL
Status: DISCONTINUED | OUTPATIENT
Start: 2022-01-05 | End: 2022-01-10 | Stop reason: HOSPADM

## 2022-01-05 RX ORDER — TOPIRAMATE 100 MG/1
100 TABLET, FILM COATED ORAL 2 TIMES DAILY
Status: DISCONTINUED | OUTPATIENT
Start: 2022-01-06 | End: 2022-01-10 | Stop reason: HOSPADM

## 2022-01-05 RX ORDER — LEVOTHYROXINE SODIUM 0.07 MG/1
75 TABLET ORAL
Status: DISCONTINUED | OUTPATIENT
Start: 2022-01-06 | End: 2022-01-10 | Stop reason: HOSPADM

## 2022-01-05 RX ORDER — HALOPERIDOL 5 MG/ML
2.5 INJECTION INTRAMUSCULAR
Status: CANCELLED | OUTPATIENT
Start: 2022-01-05

## 2022-01-05 RX ORDER — HYDROXYZINE 50 MG/1
50 TABLET, FILM COATED ORAL
Status: DISCONTINUED | OUTPATIENT
Start: 2022-01-05 | End: 2022-01-10 | Stop reason: HOSPADM

## 2022-01-05 RX ORDER — HALOPERIDOL 1 MG/1
2 TABLET ORAL
Status: CANCELLED | OUTPATIENT
Start: 2022-01-05

## 2022-01-05 RX ADMIN — FUROSEMIDE 20 MG: 40 TABLET ORAL at 08:39

## 2022-01-05 RX ADMIN — VALPROIC ACID 500 MG: 250 CAPSULE ORAL at 08:39

## 2022-01-05 RX ADMIN — METOPROLOL TARTRATE 25 MG: 25 TABLET, FILM COATED ORAL at 20:15

## 2022-01-05 RX ADMIN — TRAZODONE HYDROCHLORIDE 200 MG: 100 TABLET ORAL at 22:27

## 2022-01-05 RX ADMIN — METOPROLOL TARTRATE 25 MG: 25 TABLET, FILM COATED ORAL at 08:39

## 2022-01-05 RX ADMIN — OLANZAPINE 5 MG: 5 TABLET, ORALLY DISINTEGRATING ORAL at 15:54

## 2022-01-05 RX ADMIN — LORAZEPAM 1 MG: 2 INJECTION INTRAMUSCULAR; INTRAVENOUS at 13:28

## 2022-01-05 RX ADMIN — Medication 3 MG: at 22:27

## 2022-01-05 RX ADMIN — LOSARTAN POTASSIUM 50 MG: 50 TABLET, FILM COATED ORAL at 08:39

## 2022-01-05 RX ADMIN — BUPROPION HYDROCHLORIDE 300 MG: 150 TABLET, EXTENDED RELEASE ORAL at 08:40

## 2022-01-05 RX ADMIN — TOPIRAMATE 100 MG: 100 TABLET, FILM COATED ORAL at 08:41

## 2022-01-05 RX ADMIN — LEVOTHYROXINE SODIUM 75 MCG: 75 TABLET ORAL at 06:14

## 2022-01-05 NOTE — ED CARE HANDOFF
Emergency Department Sign Out Note        Sign out and transfer of care from Dr Christine Monsalve  See Separate Emergency Department note  The patient, Edis Lujan, was evaluated by the previous provider for schizoaffective disorder, SI  Workup Completed:  yes    ED Course / Workup Pending (followup):  201 placement  Signed out to Dr Vicente Hammonds in AM                                       Procedures  MDM        Disposition  Final diagnoses:   Schizoaffective disorder ([facility] [address] )   Suicidal ideation     Time reflects when diagnosis was documented in both MDM as applicable and the Disposition within this note     Time User Action Codes Description Comment    1/4/2022 10:46 PM Socorro Woodall Add [F25 9] Schizoaffective disorder ([facility] [address] )     1/4/2022 10:46 PM Socorro Woodall Add [W68 968] Suicidal ideation       ED Disposition     None      MD Documentation      Most Recent Value   Sending MD Haro [facility]      Follow-up Information    None       Patient's Medications   Discharge Prescriptions    No medications on file     No discharge procedures on file         ED Provider  Electronically Signed by     Rachel Kee MD  01/05/22 7578

## 2022-01-05 NOTE — ED NOTES
Patient is accepted at [facility]  Patient is accepted by Prakash Marcus on behalf of Dr Alfreda Severance per Gerhardt Caper  Transportation is arranged with [facility]  Transportation is scheduled for TBD  Patient may go to the floor 2 hours after patient arriving from [facility] ([facility] advised and aware of restrictions on time; will coordinate and call back)  Nurse report is to be called to [phone] prior to patient transfer

## 2022-01-05 NOTE — ED NOTES
Patient tearful and reporting severe anxiety, Lilly Nova MD made aware  IM ativan given per LAURO Villegas, GUS  01/05/22 2250

## 2022-01-05 NOTE — ED NOTES
Transfer packet initiated and in [facility] area  Patient was approached to update her, but she is sleeping at present

## 2022-01-05 NOTE — ED NOTES
Bed Search:  [facility] Chou: Per Michael Councilman, no beds  Essie Jahaira:  Per Cuauhtemoc Harding, no beds   [place]:  Per Safia Morton, no beds   [place]: per Darwin Weinstein, no patients being accepted  [place]: Per Aram Ferrara, no [employer]: Per Evette, no beds  [place]:  Per Geetha Nunez, no beds  [place]: Per Rabia Flaherty, no beds   [place] : Per Epifanio, no beds   [place]: No answer

## 2022-01-05 NOTE — ED NOTES
Bed search was initiated, but then Intake reached out to indicate availability at [facility]  Referral will be submitted for review there  Suspend bed search pending determination

## 2022-01-05 NOTE — ED NOTES
Insurance Authorization for admission:   Phone call placed to [facility]  Phone number: [phone]  Spoke to [facility] completed (patient has Medicare primary)  Level of care: inpatient mental health 201    Accepting facility to call to verify arrival and fax upon discharge per protocol

## 2022-01-05 NOTE — ED CARE HANDOFF
Emergency Department Sign Out Note        Sign out and transfer of care from Dr Hakeem Headley  See Separate Emergency Department note  The patient, Eliecer Nogueira, was evaluated by the previous provider for schizoaffective disorder SI  Workup Completed:  Medically cleared by previous team   Awaiting placement  ED Course / Workup Pending (followup): Signed 201  ED Course as of 01/05/22 0937   Wed Jan 05, 2022   0236 Rapid drug screen, urine(!)   0936 AMPH/METH(!): Positive   0936 THC URINE(!): Positive   0937 COVID/FLU/RSV - 2 hour TAT  wnl   0937 POCT alcohol breath test  wnl     Procedures  MDM        Disposition  Final diagnoses:   Schizoaffective disorder ([facility] [address] )   Suicidal ideation     Time reflects when diagnosis was documented in both MDM as applicable and the Disposition within this note     Time User Action Codes Description Comment    1/4/2022 10:46 PM Bernie Havers Add [F25 9] Schizoaffective disorder ([facility] [address] )     1/4/2022 10:46 PM Bernie Havers Add [J37 [id number]] Suicidal ideation       ED Disposition     None      MD Documentation      Most Recent Value   Sending MD Mikayla Marquez      Follow-up Information    None       Patient's Medications   Discharge Prescriptions    No medications on file     No discharge procedures on file         ED Provider  Electronically Signed by     Aydee Delgado MD  01/05/22 9164

## 2022-01-05 NOTE — ED NOTES
Patient requesting to speak with Crisis Worker  CW approached  Patient had head buried in bedside, sobbing uncontrollably  CW offered to talk to her, but did set limits with regard to the expectation for age-appropriate behavior  She stated, "I just don't want to live anymore    I am so depressed "  Support offered and reminded that that is why we are transferring her to a behavioral health unit  She continued to sob about being sent far away and was advised that she is going to [place] (which ED staff informed her of previously)  She stated she was told there were no beds  Assured her that she has been accepted and presented her with EMTALA to sign  She did sign and sobbing ceased almost immediately and she asked that her ACT team be contacted  She stated that she has spoken to Maldonado today, but was expecting to hear back from 2 other people as she "really needs the support"  Advised that she has a 1:1 present in her room, but she insists she needs to receive a call from these specific people and that "it is really really really important to me"  Assured patient they would be called  Call was placed to [facility] ([phone])  Voice mail message was left, advising them of the intended disposition and asking that they call the patient as requested

## 2022-01-06 PROBLEM — F41.9 ANXIETY: Status: ACTIVE | Noted: 2022-01-06

## 2022-01-06 PROBLEM — K21.9 GERD (GASTROESOPHAGEAL REFLUX DISEASE): Status: ACTIVE | Noted: 2022-01-06

## 2022-01-06 PROBLEM — F60.3 BORDERLINE PERSONALITY DISORDER (HCC): Status: ACTIVE | Noted: 2022-01-06

## 2022-01-06 PROBLEM — R19.7 DIARRHEA: Status: ACTIVE | Noted: 2022-01-06

## 2022-01-06 PROBLEM — R58 ECCHYMOSIS: Status: ACTIVE | Noted: 2022-01-06

## 2022-01-06 LAB
ALBUMIN SERPL BCP-MCNC: 3.2 G/DL (ref 3.5–5)
ALP SERPL-CCNC: 72 U/L (ref 46–116)
ALT SERPL W P-5'-P-CCNC: 26 U/L (ref 12–78)
ANION GAP SERPL CALCULATED.3IONS-SCNC: 8 MMOL/L (ref 4–13)
AST SERPL W P-5'-P-CCNC: 19 U/L (ref 5–45)
ATRIAL RATE: 56 BPM
BASOPHILS # BLD AUTO: 0.05 THOUSANDS/ΜL (ref 0–0.1)
BASOPHILS NFR BLD AUTO: 1 % (ref 0–1)
BILIRUB SERPL-MCNC: 0.3 MG/DL (ref 0.2–1)
BUN SERPL-MCNC: 12 MG/DL (ref 5–25)
CALCIUM ALBUM COR SERPL-MCNC: 9.3 MG/DL (ref 8.3–10.1)
CALCIUM SERPL-MCNC: 8.7 MG/DL (ref 8.3–10.1)
CHLORIDE SERPL-SCNC: 106 MMOL/L (ref 100–108)
CHOLEST SERPL-MCNC: 276 MG/DL
CO2 SERPL-SCNC: 24 MMOL/L (ref 21–32)
CREAT SERPL-MCNC: 0.66 MG/DL (ref 0.6–1.3)
EOSINOPHIL # BLD AUTO: 0.06 THOUSAND/ΜL (ref 0–0.61)
EOSINOPHIL NFR BLD AUTO: 1 % (ref 0–6)
ERYTHROCYTE [DISTWIDTH] IN BLOOD BY AUTOMATED COUNT: 13.1 % (ref 11.6–15.1)
GFR SERPL CREATININE-BSD FRML MDRD: 103 ML/MIN/1.73SQ M
GLUCOSE P FAST SERPL-MCNC: 90 MG/DL (ref 65–99)
GLUCOSE SERPL-MCNC: 90 MG/DL (ref 65–140)
HCG SERPL QL: NEGATIVE
HCT VFR BLD AUTO: 45.7 % (ref 34.8–46.1)
HDLC SERPL-MCNC: 60 MG/DL
HGB BLD-MCNC: 14.3 G/DL (ref 11.5–15.4)
IMM GRANULOCYTES # BLD AUTO: 0.07 THOUSAND/UL (ref 0–0.2)
IMM GRANULOCYTES NFR BLD AUTO: 1 % (ref 0–2)
LDLC SERPL CALC-MCNC: 188 MG/DL (ref 0–100)
LYMPHOCYTES # BLD AUTO: 2.99 THOUSANDS/ΜL (ref 0.6–4.47)
LYMPHOCYTES NFR BLD AUTO: 30 % (ref 14–44)
MCH RBC QN AUTO: 29.2 PG (ref 26.8–34.3)
MCHC RBC AUTO-ENTMCNC: 31.3 G/DL (ref 31.4–37.4)
MCV RBC AUTO: 93 FL (ref 82–98)
MONOCYTES # BLD AUTO: 0.98 THOUSAND/ΜL (ref 0.17–1.22)
MONOCYTES NFR BLD AUTO: 10 % (ref 4–12)
NEUTROPHILS # BLD AUTO: 5.8 THOUSANDS/ΜL (ref 1.85–7.62)
NEUTS SEG NFR BLD AUTO: 57 % (ref 43–75)
NONHDLC SERPL-MCNC: 216 MG/DL
NRBC BLD AUTO-RTO: 0 /100 WBCS
P AXIS: 34 DEGREES
PLATELET # BLD AUTO: 146 THOUSANDS/UL (ref 149–390)
PMV BLD AUTO: 11.7 FL (ref 8.9–12.7)
POTASSIUM SERPL-SCNC: 3.9 MMOL/L (ref 3.5–5.3)
PR INTERVAL: 204 MS
PROT SERPL-MCNC: 7.1 G/DL (ref 6.4–8.2)
QRS AXIS: -8 DEGREES
QRSD INTERVAL: 94 MS
QT INTERVAL: 416 MS
QTC INTERVAL: 401 MS
RBC # BLD AUTO: 4.9 MILLION/UL (ref 3.81–5.12)
SODIUM SERPL-SCNC: 138 MMOL/L (ref 136–145)
T WAVE AXIS: 28 DEGREES
T4 FREE SERPL-MCNC: 1 NG/DL (ref 0.76–1.46)
TRIGL SERPL-MCNC: 139 MG/DL
TSH SERPL DL<=0.05 MIU/L-ACNC: 7.33 UIU/ML (ref 0.36–3.74)
VENTRICULAR RATE: 56 BPM
WBC # BLD AUTO: 9.95 THOUSAND/UL (ref 4.31–10.16)

## 2022-01-06 PROCEDURE — 86592 SYPHILIS TEST NON-TREP QUAL: CPT | Performed by: PHYSICIAN ASSISTANT

## 2022-01-06 PROCEDURE — 80061 LIPID PANEL: CPT | Performed by: PHYSICIAN ASSISTANT

## 2022-01-06 PROCEDURE — 93010 ELECTROCARDIOGRAM REPORT: CPT | Performed by: INTERNAL MEDICINE

## 2022-01-06 PROCEDURE — 84439 ASSAY OF FREE THYROXINE: CPT | Performed by: PHYSICIAN ASSISTANT

## 2022-01-06 PROCEDURE — 85025 COMPLETE CBC W/AUTO DIFF WBC: CPT | Performed by: PHYSICIAN ASSISTANT

## 2022-01-06 PROCEDURE — 84443 ASSAY THYROID STIM HORMONE: CPT | Performed by: PHYSICIAN ASSISTANT

## 2022-01-06 PROCEDURE — 99222 1ST HOSP IP/OBS MODERATE 55: CPT

## 2022-01-06 PROCEDURE — 80053 COMPREHEN METABOLIC PANEL: CPT | Performed by: PHYSICIAN ASSISTANT

## 2022-01-06 PROCEDURE — 99222 1ST HOSP IP/OBS MODERATE 55: CPT | Performed by: STUDENT IN AN ORGANIZED HEALTH CARE EDUCATION/TRAINING PROGRAM

## 2022-01-06 PROCEDURE — 84703 CHORIONIC GONADOTROPIN ASSAY: CPT | Performed by: PHYSICIAN ASSISTANT

## 2022-01-06 PROCEDURE — 93005 ELECTROCARDIOGRAM TRACING: CPT

## 2022-01-06 RX ORDER — CHOLESTYRAMINE LIGHT 4 G/5.7G
4 POWDER, FOR SUSPENSION ORAL 2 TIMES DAILY
Status: DISCONTINUED | OUTPATIENT
Start: 2022-01-06 | End: 2022-01-06

## 2022-01-06 RX ORDER — GABAPENTIN 100 MG/1
100 CAPSULE ORAL 3 TIMES DAILY
Status: DISCONTINUED | OUTPATIENT
Start: 2022-01-06 | End: 2022-01-07

## 2022-01-06 RX ORDER — MONTELUKAST SODIUM 4 MG/1
1 TABLET, CHEWABLE ORAL 2 TIMES DAILY
Status: DISCONTINUED | OUTPATIENT
Start: 2022-01-06 | End: 2022-01-10 | Stop reason: HOSPADM

## 2022-01-06 RX ORDER — LOPERAMIDE HYDROCHLORIDE 2 MG/1
2 CAPSULE ORAL 3 TIMES DAILY PRN
Status: DISCONTINUED | OUTPATIENT
Start: 2022-01-06 | End: 2022-01-10 | Stop reason: HOSPADM

## 2022-01-06 RX ORDER — DIVALPROEX SODIUM 500 MG/1
500 TABLET, EXTENDED RELEASE ORAL DAILY
Status: DISCONTINUED | OUTPATIENT
Start: 2022-01-06 | End: 2022-01-06

## 2022-01-06 RX ORDER — ALBUTEROL SULFATE 90 UG/1
2 AEROSOL, METERED RESPIRATORY (INHALATION) EVERY 4 HOURS PRN
Status: DISCONTINUED | OUTPATIENT
Start: 2022-01-06 | End: 2022-01-10 | Stop reason: HOSPADM

## 2022-01-06 RX ORDER — PANTOPRAZOLE SODIUM 20 MG/1
20 TABLET, DELAYED RELEASE ORAL
Status: DISCONTINUED | OUTPATIENT
Start: 2022-01-06 | End: 2022-01-10 | Stop reason: HOSPADM

## 2022-01-06 RX ORDER — HALOPERIDOL 5 MG
5 TABLET ORAL
Status: DISCONTINUED | OUTPATIENT
Start: 2022-01-06 | End: 2022-01-10 | Stop reason: HOSPADM

## 2022-01-06 RX ADMIN — GABAPENTIN 100 MG: 100 CAPSULE ORAL at 21:27

## 2022-01-06 RX ADMIN — MONTELUKAST SODIUM 1 G: 4 TABLET, CHEWABLE ORAL at 21:26

## 2022-01-06 RX ADMIN — HALOPERIDOL 5 MG: 5 TABLET ORAL at 12:35

## 2022-01-06 RX ADMIN — BENZTROPINE MESYLATE 1 MG: 1 TABLET ORAL at 12:35

## 2022-01-06 RX ADMIN — LURASIDONE HYDROCHLORIDE 60 MG: 40 TABLET, FILM COATED ORAL at 16:04

## 2022-01-06 RX ADMIN — FUROSEMIDE 20 MG: 20 TABLET ORAL at 09:57

## 2022-01-06 RX ADMIN — HYDROXYZINE HYDROCHLORIDE 100 MG: 50 TABLET, FILM COATED ORAL at 04:01

## 2022-01-06 RX ADMIN — TRAZODONE HYDROCHLORIDE 200 MG: 100 TABLET ORAL at 21:26

## 2022-01-06 RX ADMIN — Medication 3 MG: at 21:26

## 2022-01-06 RX ADMIN — BUPROPION HYDROCHLORIDE 300 MG: 300 TABLET, FILM COATED, EXTENDED RELEASE ORAL at 09:57

## 2022-01-06 RX ADMIN — VALPROIC ACID 500 MG: 250 CAPSULE ORAL at 09:57

## 2022-01-06 RX ADMIN — TOPIRAMATE 100 MG: 100 TABLET, FILM COATED ORAL at 09:57

## 2022-01-06 RX ADMIN — LOPERAMIDE HYDROCHLORIDE 2 MG: 2 CAPSULE ORAL at 13:30

## 2022-01-06 RX ADMIN — LEVOTHYROXINE SODIUM 75 MCG: 75 TABLET ORAL at 06:36

## 2022-01-06 RX ADMIN — HALOPERIDOL 5 MG: 5 TABLET ORAL at 04:00

## 2022-01-06 RX ADMIN — DIVALPROEX SODIUM 750 MG: 250 TABLET, FILM COATED, EXTENDED RELEASE ORAL at 18:34

## 2022-01-06 RX ADMIN — METOPROLOL TARTRATE 25 MG: 25 TABLET, FILM COATED ORAL at 09:57

## 2022-01-06 RX ADMIN — HALOPERIDOL 5 MG: 5 TABLET ORAL at 21:26

## 2022-01-06 RX ADMIN — GABAPENTIN 100 MG: 100 CAPSULE ORAL at 16:04

## 2022-01-06 RX ADMIN — PANTOPRAZOLE SODIUM 20 MG: 20 TABLET, DELAYED RELEASE ORAL at 09:00

## 2022-01-06 RX ADMIN — GABAPENTIN 100 MG: 100 CAPSULE ORAL at 10:27

## 2022-01-06 RX ADMIN — TOPIRAMATE 100 MG: 100 TABLET, FILM COATED ORAL at 18:34

## 2022-01-06 RX ADMIN — LOSARTAN POTASSIUM 50 MG: 50 TABLET, FILM COATED ORAL at 09:57

## 2022-01-06 RX ADMIN — MONTELUKAST SODIUM 1 G: 4 TABLET, CHEWABLE ORAL at 10:01

## 2022-01-06 RX ADMIN — METOPROLOL TARTRATE 25 MG: 25 TABLET, FILM COATED ORAL at 21:26

## 2022-01-06 NOTE — ASSESSMENT & PLAN NOTE
· Admission Labs (CBC, CMP, hCG, Lipid panel, RPR, TSH) - pending   · Vitals Stable   · COVID/FLU/RSV Negative   · EKG- Sinus Bradycardia, HR 56,  ms   · UDS positive for THC and Methamphetamines   · POCT alcohol breath- negative in ED  · Medications and Allergies reviewed with patient   · Patient is medically stable to continue inpatient psychiatric treatment at this time

## 2022-01-06 NOTE — TREATMENT TEAM
01/06/22 1000   Activity/Group Checklist   Group Community meeting  (Why is self care important for mental health, goals)   Attendance Attended   Attendance Duration (min) 0-15   Interactions Interacted appropriately   Affect/Mood Calm   Goals Achieved Identified feelings; Able to listen to others; Able to engage in interactions     Patient participated in community meeting, She was pulled from group by the doctor, but when returned, she immediately re-engaged herself in the session  She provided feedback during discussion
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
## 2022-01-06 NOTE — TREATMENT PLAN
TREATMENT PLAN REVIEW - [address]  Rachel Parra [age] y o  1970 female MRN: [id number]    [address] [place] [address] Room / Bed: Margarita Eason/[facility] 672-26 Encounter: [id number]          Admit Date/Time:  1/5/2022  9:54 PM    Treatment Team: Attending Provider: Hamlet Black MD; Patient Care Technician: Candelaria Del Toro; Charge Nurse: Luis Loza RN; Patient Care Assistant: Aure Colin; Care Manager: Jsoe Martinez, GUS; Charge Nurse: Keri Rivers RN; Registered Nurse: Nader Sanders RN; : Chuy Alicia;  Charge Nurse: Hung Molina RN; Patient Care Technician: Mariano Torres; Security: Ciara Underwood    Diagnosis: Principal Problem:    Schizoaffective disorder, bipolar type [facility])  Active Problems:    Benign essential hypertension    Chronic low back pain    Edema    Hypothyroidism    MAG (obstructive sleep apnea)    Overactive bladder    Primary osteoarthritis of both knees    Marijuana abuse    COPD (chronic obstructive pulmonary disease) ([place] [address] )    Class 3 severe obesity due to excess calories with serious comorbidity and body mass index (BMI) of 40 0 to 44 9 in adult [facility])    Medical clearance for psychiatric admission    Substance abuse ([facility] )    GERD (gastroesophageal reflux disease)    Diarrhea    Ecchymosis    Anxiety    Borderline personality disorder ([place] [address] )      Patient Strengths/Assets: ability for insight, motivation for treatment/growth, patient is on a voluntary commitment    Patient Barriers/Limitations: limited support system, poor self-care, substance abuse    Short Term Goals: decrease in depressive symptoms, decrease in anxiety symptoms, decrease in suicidal thoughts, decrease in self abusive behaviors, improvement in insight, sleep improvement, improvement in appetite    Long Term Goals: improvement in depression, improvement in anxiety, resolution of depressive symptoms, resolution of manic symptoms, stabilization of mood, free of suicidal thoughts, no self abusive behavior, adequate self care, adequate sleep, adequate appetite    Progress Towards Goals: starting psychiatric medications as prescribed    Recommended Treatment: medication management, patient medication education, group therapy, milieu therapy, continued Behavioral Health psychiatric evaluation/assessment process    Treatment Frequency: daily medication monitoring, group and milieu therapy daily, monitoring through interdisciplinary rounds, monitoring through weekly patient care conferences    Expected Discharge Date:  5-7 days    Discharge Plan: referral for outpatient medication management with a psychiatrist, referral for outpatient psychotherapy    Treatment Plan Created/Updated By: Nicole Baer MD

## 2022-01-06 NOTE — PLAN OF CARE
Problem: SELF HARM/SUICIDALITY  Goal: Will have no self-injury during hospital stay  Description: INTERVENTIONS:  - Q 15 MINUTES: Routine safety checks  - Q WAKING SHIFT & PRN: Assess risk to determine if routine checks are adequate to maintain patient safety  - Encourage patient to participate actively in care by formulating a plan to combat response to suicidal ideation, identify supports and resources  Outcome: Not Progressing

## 2022-01-06 NOTE — TREATMENT TEAM
01/06/22 1300   Activity/Group Checklist   Group Life Skills  (Art Therapy- [employer])   Attendance Attended   Attendance Duration (min) 46-60   Interactions Interacted appropriately   Affect/Mood Appropriate;Calm   Goals Achieved Able to listen to others; Able to engage in interactions; Other (Comment)  (Spontaneous self-expression, connection and insight)     Patient utilized the provided materials to engage in the given directive  She had appropriate interactions with surrounding peers and staff  She assisted with cleaning up at the end of the session

## 2022-01-06 NOTE — CMS CERTIFICATION NOTE
Recertification: Based upon physical, mental and social evaluations, I certify that inpatient psychiatric services continue to be medically necessary for this patient for a duration of 7 midnights for the treatment of  Schizoaffective disorder, bipolar type [facility])   Available alternative community resources still do not meet the patient's mental health care needs  I further attest that an established written individualized plan of care has been updated and is outlined in the patient's medical records

## 2022-01-06 NOTE — NURSING NOTE
Pt is a 89 from [facility] with SI and self harming behaviors  Pt unable to contract for safety upon admission, staff at bedside for safety  Pt has self-inflicted, superficial cuts to bilateral wrists and thighs, reports using a blade until it fell between the sits in pt's car  Pt emailed work supervisor and then called [employer] Team on-call phone  Pt is tearful during interaction  Upon skin check pt has large bruise to Left knee and Left great toe  Pt reports kicking something in ED  Weakness to Right knee, scheduled for knee replacement in February per pt  Smokes/vapes THC 3-5 days/week, occasional [place] meth use  No alcohol or tobacco  Pt has personal CPAP at bedside for sleep

## 2022-01-06 NOTE — ASSESSMENT & PLAN NOTE
· Patient reports daily use of medical marijuana via vape pen  · Last use 1/5/21  · Encourage cessation

## 2022-01-06 NOTE — PROGRESS NOTES
Status: Pt is a 62 51 82 from [facility] who reported that she needed a COVID test to return to work & had one done on 12/30, however, the lab lost her swab  Pt was calling her PCP's office ask for a note or a new test order & felt they did not respond promptly enough & made vague threats of self harm to her ACT team & then stopped responding to their calls  Police were dispatched to her home, however, she was not there  She later contact them from the [employer] parking lot after superficially cutting her arms & thighs; Band-Aid applied  Pt reporting she needs to get to work, as she has knee replacement surgery in February & needs to work & safe up money  Reviewed readmit score: 28(RED), AUDIT: 0, PAWSS: 0, BAT: 0, UDS: +amph/meth & THC  Medication: to be reviewed / PRN - Haldol & Atarax  D/C: TBD / new admission     This patient is a 30 day readmission and was most recently discharged on:   [facility], December 21-23, 2021(2days) Pt signed a 72 hour notice  The previous discharge plan was:   Pt returned to her apartment, so she could spend [place] with her father, as her mother passed away & she didn't want him to be alone  She has ACT services through [facility], who would provide on-going support/treatment in the community  The [address] Readmission Risk score is:   28(red)  The identified triggers/events leading up to this admission include:  Pt frustrated over COVID test being lost & not having the proper documentation to be able to return to work  Initial Plans for this admission (and who will be involved in treatment and discharge planning include:  Pt will meet with the psychiatrist to review medication(s) & determine if changes or titration is needed  CM will work with Pt & their supports to identify services/treatment needed at discharge  Nursing will provide education/support on medication  Therapeutic services will facilitate groups & provide support education on coping skills         01/06/22 5756   Team Meeting   Meeting Type Daily Rounds   Team Members Present   Team Members Present Physician;Nurse; Other (Discipline and Name);    Physician Team Member Dr Bernadette Valentine / Jose Valadez / Antonieta Dover / Mitra Dupont Team Member [facility] [employer] Team Member Estela Rojas / Merrick Holto   Other (Discipline and Name) Rachid Hernandez (art therapy)   Patient/Family Present   Patient Present No   Patient's Family Present No

## 2022-01-06 NOTE — ASSESSMENT & PLAN NOTE
· Patient scheduled for R knee replacement in near future   · Patient denies daily use of pain control  · PRN ibuprofen for mild/moderate/severe pain

## 2022-01-06 NOTE — ASSESSMENT & PLAN NOTE
· Patient with L knee ecchymosis and L 1st-3rd toe ecchymosis  · Patient states she kicked a chair on 1/5/21 and noticed tenderness/blue-black discoloration around lateral 1st toe and dorsal foot on 1/6/21   · No x-rays performed at time of incident  · No swelling or deformity of joints noted on exam, normal ROM noted in L toes and knee   · Patient denies numbness, tingling, weakness but reports pain with walking

## 2022-01-06 NOTE — ASSESSMENT & PLAN NOTE
· Patient uses methamphetamines- last use several days ago (consistent with UDS)  · Patient uses medical marijuana via vape   · No tobacco/alcohol use   · Prior abuse of opiates, UDS currently negative

## 2022-01-06 NOTE — NURSING NOTE
Tearful during initial interaction  Hopeless and depressed  [facility]oonam Chavarria ACT for not following up with her within the community upon discharge  She says the ACT team told her she left the hospital too soon the last time  Patient also upset because she was unable to receive the results of her CoVid test so she missed work  Concerned about employment and financial situation  Disappointed she has to rely on father for financial support  Reports knee replacement surgery scheduled for February 23  Discussed unit rules and expectations  Initially was uninterested in working with staff but after talking with RN patient was able to contract for safety and stated she would reach out to staff if she needed anything or had harmful thoughts  Patient also open to considering changing her mood stabilizer if MD suggests

## 2022-01-06 NOTE — CONSULTS
[address] [place] [address] 1970, 49 y o  female MRN: [id number]  Unit/Bed#: [facility] [place] [address]-08 Encounter: [id number]  Primary Care Provider: JHONATAN Leo   Date and time admitted to hospital: 1/5/2022  9:54 PM    Inpatient consult for Medical Clearance for [place] patient  Consult performed by: Monica Capellan PA-C  Consult ordered by: Tiffany Samuel PA-C          * Medical clearance for psychiatric admission  Assessment & Plan  · Admission Labs (CBC, CMP, hCG, Lipid panel, RPR, TSH) - pending   · Vitals Stable   · COVID/FLU/RSV Negative   · EKG- Sinus Bradycardia, HR 56,  ms   · UDS positive for THC and Methamphetamines   · POCT alcohol breath- negative in ED  · Medications and Allergies reviewed with patient   · Patient is medically stable to continue inpatient psychiatric treatment at this time     Ecchymosis  Assessment & Plan  · Patient with L knee ecchymosis and L 1st-3rd toe ecchymosis  · Patient states she kicked a chair on 1/5/21 and noticed tenderness/blue-black discoloration around lateral 1st toe and dorsal foot on 1/6/21   · No x-rays performed at time of incident  · No swelling or deformity of joints noted on exam, normal ROM noted in L toes and knee   · Patient denies numbness, tingling, weakness but reports pain with walking     Substance abuse ([facility] )  Assessment & Plan  · Patient uses methamphetamines- last use several days ago (consistent with UDS)  · Patient uses medical marijuana via vape   · No tobacco/alcohol use   · Prior abuse of opiates, UDS currently negative     Marijuana abuse  Assessment & Plan  · Patient reports daily use of medical marijuana via vape pen  · Last use 1/5/21  · Encourage cessation     COPD (chronic obstructive pulmonary disease) ([facility] )  Assessment & Plan  · Continue home albuterol (proventil HFA, ventolin HFA) 90 mcg 2 puffs q 6hr as needed for wheezing   · No wheeze/cough/dyspnea at this time Hypothyroidism  Assessment & Plan  · Continue home levothyroxine 75 mg tablet in am   · TSH pending     Benign essential hypertension  Assessment & Plan  · Continue home meds:   · Metoprolol 25 mg BID  · Losartan 50 mg once daily     MAG (obstructive sleep apnea)  Assessment & Plan  · Patient uses C-PAP at night, continue     Edema  Assessment & Plan  · B/l edema of lower extremities at baseline   · Patient denies history of CHF/current dyspnea significant weight gain  · Continue lasix 20 mg daily     Overactive bladder  Assessment & Plan  · Continue home toviaz 8 mg daily     Diarrhea  Assessment & Plan  · Patient reports IBS with chronic diarrhea  · Was not given home meds x several days, now reporting 2 days watery diarrhea  · Restart patient cholestipol 1 g BID     GERD (gastroesophageal reflux disease)  Assessment & Plan  · Home med: rabeprazole 20 mg daily  · While inpatient- prontonix 20 mg daily     Primary osteoarthritis of both knees  Assessment & Plan  · Patient scheduled for R knee replacement in near future   · Patient denies daily use of pain control  · PRN ibuprofen for mild/moderate/severe pain     Chronic low back pain  Assessment & Plan  · Pain control as needed for arthritis- ibuprofen    Class 3 severe obesity due to excess calories with serious comorbidity and body mass index (BMI) of 40 0 to 44 9 in [facility])  Assessment & Plan  · Encourage weight loss via dietary/lifestyle modifications         VTE Prophylaxis:   Low Risk (Score 0-2) - Encourage Ambulation  Recommendations for Discharge:  · Continue outpatient medications   · Close follow up with PCP   · Encourage cessation of substance use  · Encourage lifestyle changes for obesity     Counseling / Coordination of Care Time: 45 minutes Greater than 50% of total time spent on patient counseling and coordination of care  Collaboration of Care:  Were Recommendations Directly Discussed with Primary Treatment Team? No    History of Present Illness:  Solis Cash is a 49 y o  female with PMH of bipolar disorder, depression, suicidal ideation, COPD, HTN, GERD, Obesity, MAG, chronic edema, and polysubstance abuse who is originally admitted to the Inpatient psychiatric unit via voluntary 201 status service due to worsening depression, suicidal ideation, and self harm  We are consulted for medical clearance of the patient to continue inpatient psychiatric care  The patient denies recent travel but believes she may have had COVID recently despite negative testing  No additional sick contacts  The patient's medical history and daily medications were reviewed and prescribed appropriately as above for her multiple chronic conditions  Allergies were reviewed and are accurate as listed in chart  The patient was noted to have ecchymosis of L medial knee and L toes (1-3) following incident on 1/5/21 when patient kicked chair  Patient reports pain with weight bearing but has normal ROM despite mild tenderness  As no joint deformity or swelling is present, recommend no imaging at this time, however can be considered if pain worsens or does not improve gradually over several days  Patient was recently hospitalized several weeks previously for similar conditions of worsening depression and suicidal thoughts  Patient continues to endorse both methamphetamine abuse and daily marijuana use  She denies tobacco, alcohol, or additional substance abuse  The patient's vitals are currently stable, and admission labs are pending  She is medically cleared for continued inpatient psychiatric care at this time  If new questions or concerns arise, please contact [facility] provider  Review of Systems:  Review of Systems   Constitutional: Negative for appetite change, chills, diaphoresis, fatigue and fever  HENT: Negative for hearing loss, postnasal drip, rhinorrhea, sinus pressure, sinus pain and sore throat      Eyes: Negative for photophobia, pain, itching and visual disturbance  Respiratory: Negative for cough, chest tightness, shortness of breath and stridor  Cardiovascular: Positive for leg swelling  Negative for chest pain and palpitations  Gastrointestinal: Positive for diarrhea and rectal pain  Negative for abdominal distention, abdominal pain, constipation, nausea and vomiting  Endocrine: Negative for cold intolerance and heat intolerance  Genitourinary: Negative for difficulty urinating, dysuria, frequency and urgency  Musculoskeletal: Positive for arthralgias and back pain  Negative for joint swelling and myalgias  Skin: Positive for wound  Negative for rash  Neurological: Negative for dizziness, tremors, weakness, light-headedness, numbness and headaches  Psychiatric/Behavioral: Positive for dysphoric mood, sleep disturbance and suicidal ideas  Negative for confusion, hallucinations and self-injury  The patient is nervous/anxious  Past Medical and Surgical History:   Past Medical History:   Diagnosis Date    Anxiety     Arthritis     oa cassandra knees    Asthma     good control- no medications    Yan's esophagus     Bipolar affective disorder (HCC)     Chronic pain disorder     lumbar    CPAP (continuous positive airway pressure) dependence     Degenerative disc disease at L5-S1 level     Deliberate self-cutting     Depression 09/16/2008    Disease of thyroid gland     hypo    MARTINEZ (dyspnea on exertion)     Drug overdose 10/28/2008    suicide attempt    Dysphagia     Dyspnea     Edema     BLE    GERD (gastroesophageal reflux disease)     High blood pressure     History of COVID-19 12/30/2020    Symptoms started on 12/30/2020 and then got worse  Today she is feeling a little bit better    She is a candidate for monoclonal antibody infusion and set for 1/6/21 @ 1pm at [facility]  01/07/21 - telemedicine -     Hypertension     Knee pain, bilateral     Especially right    Migraines     Obese     Overactive bladder  Sjogren's disease ([facility] [address] )     Sleep apnea     Stress incontinence     Suicidal ideations     Umbilical hernia     surgical repair today 4/24/2019    Use of cane as ambulatory aid     awaiting b/l knee replacement    Wears glasses        Past Surgical History:   Procedure Laterality Date    BREAST BIOPSY Right 1989    benign    CARPAL TUNNEL RELEASE Left     CHOLECYSTECTOMY  05/2003    Laparoscopic    COLONOSCOPY      01/12/2009    DILATION AND CURETTAGE OF UTERUS      ELBOW SURGERY Left     x2  No hardware    ESOPHAGOGASTRODUODENOSCOPY      FOOT SURGERY Left     Plantar fasciotomy    HYSTERECTOMY      laporoscopic, partial    KNEE ARTHROSCOPY Bilateral     OOPHORECTOMY Left 10/2015    WY ANAL SPHINCTEROTOMY N/A 8/31/2018    Procedure: EUA, LEFT PARTIAL INTERNAL SPHINCTEROTOMY;  Surgeon: Gino Cesar MD;  Location:  [facility];  Service: Colorectal    WY GASTROCNEMIUS RECESSION Left 2/24/2021    Procedure: RECESSION GASTROC OPEN;  Surgeon: Trev Dorado DPM;  Location: [address];  [facility] IJDP,5+N/P,LYNVJ N/A 4/24/2019    Procedure: REPAIR HERNIA UMBILICAL LAPAROSCOPIC W/ ROBOTICS;  Surgeon: Gina High MD;  Location: [facility];  Service: General    REDUCTION MAMMAPLASTY Bilateral 1999    REPAIR LACERATION Left     left hand-5/18/2009    ROTATOR CUFF REPAIR Left     TONSILECTOMY AND ADNOIDECTOMY      TONSILLECTOMY      US GUIDED MSK PROCEDURE  4/22/2021    VEIN LIGATION Bilateral     WISDOM TOOTH EXTRACTION         Meds/Allergies:  PTA meds:   Prior to Admission Medications   Prescriptions Last Dose Informant Patient Reported? Taking?    COLESTIPOL HCL PO  Self Yes No   Sig: Take 1 g by mouth 2 (two) times a day   Cholecalciferol (Vitamin D3) 125 MCG (5000 UT) TABS  Self Yes No   Sig: Take 5,000 Units by mouth daily   Lurasidone HCl (LATUDA PO)  Self Yes No   Sig: Take 60 mg by mouth daily at bedtime     Toviaz 8 MG TB24   No No   Sig: TAKE 1 TABLET (8 MG TOTAL) BY MOUTH DAILY   Patient taking differently: Take 4 mg by mouth daily     albuterol (PROVENTIL HFA,VENTOLIN HFA) 90 mcg/act inhaler  Self Yes No   Sig: Inhale 2 puffs every 6 (six) hours as needed for wheezing   buPROPion (WELLBUTRIN XL) 300 mg 24 hr tablet  Self Yes No   Sig: Take 300 mg by mouth daily   divalproex sodium (DEPAKOTE ER) 500 mg 24 hr tablet   No No   Sig: Take 1 tablet (500 mg total) by mouth daily   estradiol (ESTRACE) 1 mg tablet  Self No No   Sig: Take 1 tablet (1 mg total) by mouth daily   furosemide (LASIX) 20 mg tablet   No No   Sig: Take 1 tablet (20 mg total) by mouth daily   haloperidol decanoate (Haldol Decanoate) 50 mg/mL injection  Self Yes No   Sig: Inject 50 mg into a muscle every 30 (thirty) days   levothyroxine 75 mcg tablet  Self No No   Sig: Take 1 tablet (75 mcg total) by mouth daily   losartan (COZAAR) 50 mg tablet  Self Yes No   Sig: Take 50 mg by mouth daily   melatonin 3 mg   Yes No   Sig:     metoprolol tartrate (LOPRESSOR) 25 mg tablet   Yes No   Sig: Take 25 mg by mouth every 12 (twelve) hours     topiramate (TOPAMAX) 100 mg tablet  Self Yes No   Sig: Take 100 mg by mouth 2 (two) times a day   traZODone (DESYREL) 100 mg tablet  Self Yes No   Sig: Take 200 mg by mouth daily at bedtime       Facility-Administered Medications: None       Allergies: Allergies   Allergen Reactions    Percocet [Oxycodone-Acetaminophen] Headache     Severe headaches   (denies issues with Tylenol)    Betadine [Povidone Iodine] Rash     Unsure if betadine or gauze post surgical  Got rash on leg          Social History:  Marital Status: Single  Substance Use History:   Social History     Substance and Sexual Activity   Alcohol Use Not Currently    Comment: Recovering alcoholic     Social History     Tobacco Use   Smoking Status Former Smoker    Packs/day: 3 00    Types: Cigarettes    Quit date: 2018    Years since quittin 0   Smokeless Tobacco Never Used   Tobacco Comment    Started at age 14  Social History     Substance and Sexual Activity   Drug Use Yes    Types: Marijuana, Methamphetamines    Comment: medical- vapes 3 times per wk at        Family History:  Family History   Problem Relation Age of Onset   [facility] Kidney cancer Mother     Diabetes Mother     Colon cancer Family     No Known Problems Father     No Known Problems Sister     Colon cancer Paternal Uncle     Colon cancer Maternal Uncle     Colon cancer Maternal Uncle        Physical Exam:   Vitals:   Blood Pressure: 133/81 (01/05/22 2207)  Pulse: 70 (01/05/22 2207)  Temperature: (!) 97 1 °F (36 2 °C) (01/05/22 2207)  Temp Source: Tympanic (01/05/22 2207)  Respirations: 17 (01/05/22 2207)  Height: 5' 6" (167 6 cm) (01/05/22 2207)  Weight - Scale: 121 kg (267 lb) (01/05/22 2207)  SpO2: 94 % (01/05/22 2207)    Physical Exam  Vitals and nursing note reviewed  Constitutional:       General: She is not in acute distress  Appearance: Normal appearance  She is obese  She is not ill-appearing, toxic-appearing or diaphoretic  HENT:      Head: Normocephalic and atraumatic  Right Ear: External ear normal       Left Ear: External ear normal       Nose: Nose normal       Mouth/Throat:      Mouth: Mucous membranes are dry  Pharynx: Oropharynx is clear  Eyes:      Extraocular Movements: Extraocular movements intact  Conjunctiva/sclera: Conjunctivae normal    Cardiovascular:      Rate and Rhythm: Normal rate and regular rhythm  Pulses: Normal pulses  Heart sounds: Normal heart sounds  No murmur heard  No friction rub  Pulmonary:      Effort: Pulmonary effort is normal  No respiratory distress  Breath sounds: Normal breath sounds  No stridor  No wheezing, rhonchi or rales  Abdominal:      General: Bowel sounds are normal       Palpations: Abdomen is soft  Tenderness: There is no abdominal tenderness  There is no guarding     Musculoskeletal:         General: Signs of injury present  Normal range of motion  Cervical back: Normal range of motion and neck supple  No tenderness  Right lower leg: Edema present  Left lower leg: Edema present  Comments: Thin lacerations on b/l wrists, healing, no erythema/drainage  Patient reports same on inner thighs  Ecchymosis of L knee and L toes 1-3   Lymphadenopathy:      Cervical: No cervical adenopathy  Skin:     General: Skin is warm and dry  Findings: Bruising present  No rash  Neurological:      General: No focal deficit present  Mental Status: She is alert and oriented to person, place, and time  Mental status is at baseline  Additional Data:   Lab Results:                    Lab Results   Component Value Date/Time    HGBA1C 4 9 12/18/2020 09:07 AM    HGBA1C 5 3 06/26/2020 03:37 PM    HGBA1C 5 4 10/16/2019 10:52 AM               Imaging: No pertinent imaging reviewed  No orders to display       EKG, Pathology, and Other Studies Reviewed on Admission:   · EKG: Sinus Bradycardia  HR 56,   ** Please Note: This note may have been constructed using a voice recognition system   **

## 2022-01-06 NOTE — ASSESSMENT & PLAN NOTE
· B/l edema of lower extremities at baseline   · Patient denies history of CHF/current dyspnea significant weight gain  · Continue lasix 20 mg daily

## 2022-01-06 NOTE — ASSESSMENT & PLAN NOTE
· Continue home albuterol (proventil HFA, ventolin HFA) 90 mcg 2 puffs q 6hr as needed for wheezing   · No wheeze/cough/dyspnea at this time

## 2022-01-06 NOTE — ASSESSMENT & PLAN NOTE
· Patient reports IBS with chronic diarrhea  · Was not given home meds x several days, now reporting 2 days watery diarrhea  · Restart patient cholestipol 1 g BID

## 2022-01-06 NOTE — NURSING NOTE
Good effect obtained from Haldol 5 mg po prn given at 0401 for severe agitation(34) & Atarax 100 mg po prn severe anxiety (H=20), as sleeping in recliner in quiet room since 0500  Will continue to monitor

## 2022-01-06 NOTE — NURSING NOTE
1 - jacket (red)  3 - t-shirt (red)  1 - bra (gray  1 - bracelet  2 - pairs earrings  8 - pair socks  5 - pajama pant, (1) w drawstring  3 - mask  1 - figurine  1 - backpack (red)  1 - toothbrush  1 - comb  1 - sponge ball  1 - travel size toothpaste  1 - phone  box  1 - book  1 - pair of flip flops  9 - panties

## 2022-01-06 NOTE — NURSING NOTE
Patient asked to speak with this writer and requested a room change  Reports poor sleep due to roommate snoring and coughing throughout the night  States last night she spent some time in the quiet room and sleeping in the recliner  Patient cooperative and appreciative  Continues to deny SI or self harm thoughts at this time and verbally contracts for safety

## 2022-01-07 LAB — RPR SER QL: NORMAL

## 2022-01-07 RX ORDER — GABAPENTIN 300 MG/1
300 CAPSULE ORAL 3 TIMES DAILY
Qty: 90 CAPSULE | Refills: 0 | Status: CANCELLED | OUTPATIENT
Start: 2022-01-07 | End: 2022-02-06

## 2022-01-07 RX ORDER — GABAPENTIN 300 MG/1
300 CAPSULE ORAL 3 TIMES DAILY
Status: DISCONTINUED | OUTPATIENT
Start: 2022-01-07 | End: 2022-01-08

## 2022-01-07 RX ADMIN — FUROSEMIDE 20 MG: 20 TABLET ORAL at 09:50

## 2022-01-07 RX ADMIN — GABAPENTIN 300 MG: 300 CAPSULE ORAL at 16:15

## 2022-01-07 RX ADMIN — Medication 3 MG: at 21:16

## 2022-01-07 RX ADMIN — METOPROLOL TARTRATE 25 MG: 25 TABLET, FILM COATED ORAL at 21:17

## 2022-01-07 RX ADMIN — TOPIRAMATE 100 MG: 100 TABLET, FILM COATED ORAL at 09:49

## 2022-01-07 RX ADMIN — HALOPERIDOL 5 MG: 5 TABLET ORAL at 21:16

## 2022-01-07 RX ADMIN — TOPIRAMATE 100 MG: 100 TABLET, FILM COATED ORAL at 17:05

## 2022-01-07 RX ADMIN — MONTELUKAST SODIUM 1 G: 4 TABLET, CHEWABLE ORAL at 09:52

## 2022-01-07 RX ADMIN — PANTOPRAZOLE SODIUM 20 MG: 20 TABLET, DELAYED RELEASE ORAL at 05:58

## 2022-01-07 RX ADMIN — BUPROPION HYDROCHLORIDE 300 MG: 300 TABLET, FILM COATED, EXTENDED RELEASE ORAL at 09:49

## 2022-01-07 RX ADMIN — IBUPROFEN 800 MG: 800 TABLET ORAL at 09:51

## 2022-01-07 RX ADMIN — LEVOTHYROXINE SODIUM 75 MCG: 75 TABLET ORAL at 05:58

## 2022-01-07 RX ADMIN — TRAZODONE HYDROCHLORIDE 200 MG: 100 TABLET ORAL at 21:17

## 2022-01-07 RX ADMIN — DIVALPROEX SODIUM 750 MG: 250 TABLET, FILM COATED, EXTENDED RELEASE ORAL at 09:49

## 2022-01-07 RX ADMIN — MONTELUKAST SODIUM 1 G: 4 TABLET, CHEWABLE ORAL at 21:17

## 2022-01-07 RX ADMIN — LURASIDONE HYDROCHLORIDE 60 MG: 40 TABLET, FILM COATED ORAL at 16:14

## 2022-01-07 RX ADMIN — LOSARTAN POTASSIUM 50 MG: 50 TABLET, FILM COATED ORAL at 09:50

## 2022-01-07 RX ADMIN — GABAPENTIN 100 MG: 100 CAPSULE ORAL at 09:50

## 2022-01-07 RX ADMIN — GABAPENTIN 300 MG: 300 CAPSULE ORAL at 21:17
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
## 2022-01-07 NOTE — PROGRESS NOTES
Status: Pt had a rough morning, but seemed to improve throughout the day  She is focused on her ACT Team & that they are not giving her the attention & care that she needs  She got angry after lunch & punched the wall; PRN given & effective  Pt attended group & slept overnight  Medication: medications started / PRN - Cogentin, Haldol, & Immodium  D/C: next week / Pt is focused on d/c on Mon, so that she can work [employer] to gave two [facility] , Ranjit Nunez, to request a TEAMS meeting with Pt prior to her discharge, so that Pt can expressed what she needs to feel supported  01/07/22 0750   Team Meeting   Meeting Type Daily Rounds   Team Members Present   Team Members Present Physician;Nurse;; Other (Discipline and Name)   Physician Team Member Dr Gordon Rodas / George Hodges / Sam Raymond Team Member Jasmin Gibson / Renetta Roca Management Team Member Romana Kern / Zafar Vaca   Other (Discipline and Name) Vernadine Fothergill (art therapy)   Patient/Family Present   Patient Present No   Patient's Family Present No

## 2022-01-07 NOTE — CASE MANAGEMENT
DAVID contacted [facility] @ [phone] & left a message on the general voicemail requesting a returned call  DAVID also sent an email to Pt's therapist, Landry Islas to notify her that Pt's signed a 72 hour notice  CM requesting to schedule a video meeting with Pt & the ACT team prior to her d/c

## 2022-01-07 NOTE — TREATMENT TEAM
01/07/22 1000   Activity/Group Checklist   Group [facility]  (Who are you, who do you want to be, goals)   Attendance Attended   Attendance Duration (min) 16-30   Interactions Did not interact   Affect/Mood Blunted/flat   Goals Achieved Able to listen to others     Patient remained quiet throughout most of the session  She declined to share her work and goals during discussion, and she left the group  She returned once the session was over

## 2022-01-07 NOTE — CASE MANAGEMENT
CM met with Pt who reported she slept alright, but her back was bothering her some  CM reviewed that she sent an email to [address] [place] [address] , Norm Keyes yesterday afternoon & was just waiting for a response to schedule a meeting with them  Pt agreeable & said that she really needs to d/c on Mon, so she can work on Tuesday  CM reviewed that the treatment team had not sent a d/c date yet

## 2022-01-07 NOTE — NURSING NOTE
Pt requesting personal clothing from locker  Staff explained that they would ask her RN in which pt returned to her room and slammed door  This writer spoke to patient  Pt states her pants were torn and she was embarrassed to be out in the brown/dining room  Provided patient with new pair of pants and encouraged pt to speak with staff to express concerns because staff were unaware her clothes were ripped  Pt states understanding of this  Tearful in conversation stating she is "bored here but I'm still so sad so I know I need to be here longer " Reports having SI with no plan  Verbally contracts for safety  Expresses frustration with being on the unit due to limited activities she can do  Encouraged pt to attend groups throughout the day and to utilize healthy coping skills  Pt states at home she typically enjoys reading and that she does have a book she wants to read so may do this later  Denies any questions regarding scheduled medications  Reports she slept well overnight

## 2022-01-07 NOTE — PLAN OF CARE
Problem: SELF HARM/SUICIDALITY  Goal: Will have no self-injury during hospital stay  Description: INTERVENTIONS:  - Q 15 MINUTES: Routine safety checks  - Q WAKING SHIFT & PRN: Assess risk to determine if routine checks are adequate to maintain patient safety  - Encourage patient to participate actively in care by formulating a plan to combat response to suicidal ideation, identify supports and resources  1/6/2022 2139 by Shad Espinoza RN  Outcome: Progressing  1/6/2022 2138 by Shad Espinoza RN  Outcome: Progressing  1/6/2022 2138 by Shad Espinoza RN  Outcome: Progressing     Problem: DEPRESSION  Goal: Will be euthymic at discharge  Description: INTERVENTIONS:  - Administer medication as ordered  - Provide emotional support via 1:1 interaction with staff  - Encourage involvement in milieu/groups/activities  - Monitor for social isolation  1/6/2022 2139 by Shad Espinoza RN  Outcome: Progressing  1/6/2022 2138 by Shad Espinoza RN  Outcome: Progressing  1/6/2022 2138 by Shad Espinoza RN  Outcome: Progressing     Problem: ANXIETY  Goal: Will report anxiety at manageable levels  Description: INTERVENTIONS:  - Administer medication as ordered  - Teach and encourage coping skills  - Provide emotional support  - Assess patient/family for anxiety and ability to cope  1/6/2022 2139 by Shad Espinoza RN  Outcome: Progressing  1/6/2022 2138 by Shad Espinoza RN  Outcome: Progressing  1/6/2022 2138 by Shad Espinoza RN  Outcome: Progressing  Goal: By discharge: Patient will verbalize 2 strategies to deal with anxiety  Description: Interventions:  - Identify any obvious source/trigger to anxiety  - Staff will assist patient in applying identified coping technique/skills  - Encourage attendance of scheduled groups and activities  1/6/2022 2139 by Shad Espinoza RN  Outcome: Progressing  1/6/2022 2138 by Shad Espinoza RN  Outcome: Progressing  1/6/2022 2138 by Luis Denia, RN  Outcome: Progressing     Problem: Risk for Self Injury/Neglect  Goal: Refrain from harming self  Description: Interventions:  - Monitor patient closely, per order  - Develop a trusting relationship  - Supervise medication ingestion, monitor effects and side effects   1/6/2022 2139 by Luis Loza RN  Outcome: Progressing  1/6/2022 2138 by Luis Loza RN  Outcome: Progressing  1/6/2022 2138 by Luis Loza RN  Outcome: Progressing  Goal: Attend and participate in unit activities, including therapeutic, recreational, and educational groups  Description: Interventions:  - Provide therapeutic and educational activities daily, encourage attendance and participation, and document same in the medical record  - Obtain collateral information, encourage visitation and family involvement in care   1/6/2022 2139 by Luis Loza RN  Outcome: Progressing  1/6/2022 2138 by Luis Loza RN  Outcome: Progressing  1/6/2022 2138 by Luis Loza RN  Outcome: Progressing     Problem: Risk for Violence/Aggression Toward Others  Goal: Refrain from harming others  1/6/2022 2139 by Luis Loza RN  Outcome: Progressing  1/6/2022 2138 by Luis Loza RN  Outcome: Progressing  1/6/2022 2138 by Luis Loza RN  Outcome: Progressing  Goal: Refrain from destructive acts on the environment or property  1/6/2022 2139 by Luis Loza RN  Outcome: Progressing  1/6/2022 2138 by Luis Loza RN  Outcome: Progressing  1/6/2022 2138 by Luis Loza RN  Outcome: Progressing  Goal: Control angry outbursts  Description: Interventions:  - Monitor patient closely, per order  - Ensure early verbal de-escalation  - Monitor prn medication needs  - Set reasonable/therapeutic limits, outline behavioral expectations, and consequences   - Provide a non-threatening milieu, utilizing the least restrictive interventions   1/6/2022 2139 by Luis Loza RN  Outcome: Progressing  1/6/2022 2138 by Sameer Willoughby RN  Outcome: Progressing  1/6/2022 2138 by Sameer Willoughby RN  Outcome: Progressing     Problem: SAFETY ADULT  Goal: Patient will remain free of falls  Description: INTERVENTIONS:  - Keep Call bell within reach  - Keep bed low and locked with side rails adjusted as appropriate  - Keep care items and personal belongings within reach  - Initiate and maintain comfort rounds  - Make Fall Risk Sign visible to staff  - Apply yellow socks and bracelet for high fall risk patients  - Consider moving patient to room near nurses station  1/6/2022 2139 by Sameer Willoughby RN  Outcome: Progressing  1/6/2022 2138 by Sameer Willoughby RN  Outcome: Progressing  1/6/2022 2138 by Sameer Willoughby RN  Outcome: Progressing     Problem: DISCHARGE PLANNING  Goal: Discharge to home or other facility with appropriate resources  Description: INTERVENTIONS:  - Identify barriers to discharge w/patient and caregiver  - Arrange for needed discharge resources and transportation as appropriate  - Identify discharge learning needs (meds, wound care, etc )  - Arrange for interpretive services to assist at discharge as needed  - Refer to Case Management Department for coordinating discharge planning if the patient needs post-hospital services based on physician/advanced practitioner order or complex needs related to functional status, cognitive ability, or social support system  1/6/2022 2139 by Sameer Willoughby RN  Outcome: Progressing  1/6/2022 2138 by Sameer Willoughby RN  Outcome: Progressing  1/6/2022 2138 by Sameer Willoughby RN  Outcome: Progressing     Problem: Ineffective Coping  Goal: Participates in unit activities  Description: Interventions:  - Provide therapeutic environment   - Provide required programming   - Redirect inappropriate behaviors   1/6/2022 2139 by Sameer Willoughby RN  Outcome: Progressing  1/6/2022 2138 by Sameer Willoughby RN  Outcome: Progressing  1/6/2022 2138 by Alfonso Lieberman RN  Outcome: Progressing     Problem: Nutrition/Hydration-ADULT  Goal: Nutrient/Hydration intake appropriate for improving, restoring or maintaining nutritional needs  Description: Monitor and assess patient's nutrition/hydration status for malnutrition  Collaborate with interdisciplinary team and initiate plan and interventions as ordered  Monitor patient's weight and dietary intake as ordered or per policy  Utilize nutrition screening tool and intervene as necessary  Determine patient's food preferences and provide high-protein, high-caloric foods as appropriate       INTERVENTIONS:  - Monitor oral intake, urinary output, labs, and treatment plans  - Assess nutrition and hydration status and recommend course of action  - Evaluate amount of meals eaten  - Assist patient with eating if necessary   - Allow adequate time for meals  - Recommend/ encourage appropriate diets, oral nutritional supplements, and vitamin/mineral supplements  - Order, calculate, and assess calorie counts as needed  - Recommend, monitor, and adjust tube feedings and TPN/PPN based on assessed needs  - Assess need for intravenous fluids  - Provide specific nutrition/hydration education as appropriate  - Include patient/family/caregiver in decisions related to nutrition  1/6/2022 2139 by Alfonso Lieberman RN  Outcome: Progressing  1/6/2022 2138 by Alfonso Lieberman RN  Outcome: Progressing  1/6/2022 2138 by Alfonso Lieberman RN  Outcome: Progressing

## 2022-01-07 NOTE — NURSING NOTE
Pt had napped throughout evening  Awake at 2035, walked halls, attended snack  Staff helped pt with putting linens on bed  Pt denies SI at this time, elevated depression  Remains in paper scrubs  Calm, cooperative during interview  Reports PRN Haldol today was helpful and was started on scheduled haldol at bedtime tonight  Educated on medication, pt hopeful, in time, that medication will help with symptoms  Denies questions or concerns at this time
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
## 2022-01-07 NOTE — CASE MANAGEMENT
CM sent an email to [facility] , Deven Arroyo to notify her of Pt's admission & request to schedule a TEAMS meeting with them & Pt on Friday or Monday

## 2022-01-08 PROCEDURE — 99232 SBSQ HOSP IP/OBS MODERATE 35: CPT | Performed by: PSYCHIATRY & NEUROLOGY

## 2022-01-08 RX ORDER — GABAPENTIN 100 MG/1
100 CAPSULE ORAL 3 TIMES DAILY
Status: DISCONTINUED | OUTPATIENT
Start: 2022-01-08 | End: 2022-01-10 | Stop reason: HOSPADM

## 2022-01-08 RX ORDER — TRAZODONE HYDROCHLORIDE 150 MG/1
150 TABLET ORAL
Status: DISCONTINUED | OUTPATIENT
Start: 2022-01-08 | End: 2022-01-10 | Stop reason: HOSPADM

## 2022-01-08 RX ADMIN — HALOPERIDOL 5 MG: 5 TABLET ORAL at 21:13

## 2022-01-08 RX ADMIN — PANTOPRAZOLE SODIUM 20 MG: 20 TABLET, DELAYED RELEASE ORAL at 05:58

## 2022-01-08 RX ADMIN — MONTELUKAST SODIUM 1 G: 4 TABLET, CHEWABLE ORAL at 21:34

## 2022-01-08 RX ADMIN — LEVOTHYROXINE SODIUM 75 MCG: 75 TABLET ORAL at 05:58

## 2022-01-08 RX ADMIN — GABAPENTIN 300 MG: 300 CAPSULE ORAL at 09:40

## 2022-01-08 RX ADMIN — MONTELUKAST SODIUM 1 G: 4 TABLET, CHEWABLE ORAL at 09:41

## 2022-01-08 RX ADMIN — METOPROLOL TARTRATE 25 MG: 25 TABLET, FILM COATED ORAL at 21:12

## 2022-01-08 RX ADMIN — GABAPENTIN 100 MG: 100 CAPSULE ORAL at 21:13

## 2022-01-08 RX ADMIN — FUROSEMIDE 20 MG: 20 TABLET ORAL at 09:40

## 2022-01-08 RX ADMIN — Medication 3 MG: at 21:13

## 2022-01-08 RX ADMIN — LURASIDONE HYDROCHLORIDE 60 MG: 40 TABLET, FILM COATED ORAL at 17:17

## 2022-01-08 RX ADMIN — DIVALPROEX SODIUM 750 MG: 250 TABLET, FILM COATED, EXTENDED RELEASE ORAL at 09:40

## 2022-01-08 RX ADMIN — TOPIRAMATE 100 MG: 100 TABLET, FILM COATED ORAL at 09:40

## 2022-01-08 RX ADMIN — GABAPENTIN 100 MG: 100 CAPSULE ORAL at 17:18

## 2022-01-08 RX ADMIN — LOSARTAN POTASSIUM 50 MG: 50 TABLET, FILM COATED ORAL at 09:40

## 2022-01-08 RX ADMIN — BUPROPION HYDROCHLORIDE 300 MG: 300 TABLET, FILM COATED, EXTENDED RELEASE ORAL at 09:40

## 2022-01-08 RX ADMIN — METOPROLOL TARTRATE 25 MG: 25 TABLET, FILM COATED ORAL at 09:40

## 2022-01-08 RX ADMIN — TRAZODONE HYDROCHLORIDE 150 MG: 150 TABLET ORAL at 21:13

## 2022-01-08 RX ADMIN — TOPIRAMATE 100 MG: 100 TABLET, FILM COATED ORAL at 17:18

## 2022-01-08 NOTE — NURSING NOTE
Pt denies SI/HI and AVH  Pt reports increase in drowsiness today and yesterday  Pt concerned of feeling like this when discharged  This writer acknowledged patients concerns and went over medications with the patient  Pt napping throughout the day  Pt OOB for meals and social with select peers  Pt denies any question or concern at this time

## 2022-01-08 NOTE — PLAN OF CARE
Problem: SELF HARM/SUICIDALITY  Goal: Will have no self-injury during hospital stay  Description: INTERVENTIONS:  - Q 15 MINUTES: Routine safety checks  - Q WAKING SHIFT & PRN: Assess risk to determine if routine checks are adequate to maintain patient safety  - Encourage patient to participate actively in care by formulating a plan to combat response to suicidal ideation, identify supports and resources  Outcome: Progressing     Problem: DEPRESSION  Goal: Will be euthymic at discharge  Description: INTERVENTIONS:  - Administer medication as ordered  - Provide emotional support via 1:1 interaction with staff  - Encourage involvement in milieu/groups/activities  - Monitor for social isolation  Outcome: Progressing     Problem: ANXIETY  Goal: Will report anxiety at manageable levels  Description: INTERVENTIONS:  - Administer medication as ordered  - Teach and encourage coping skills  - Provide emotional support  - Assess patient/family for anxiety and ability to cope  Outcome: Progressing  Goal: By discharge: Patient will verbalize 2 strategies to deal with anxiety  Description: Interventions:  - Identify any obvious source/trigger to anxiety  - Staff will assist patient in applying identified coping technique/skills  - Encourage attendance of scheduled groups and activities  Outcome: Progressing     Problem: Risk for Self Injury/Neglect  Goal: Refrain from harming self  Description: Interventions:  - Monitor patient closely, per order  - Develop a trusting relationship  - Supervise medication ingestion, monitor effects and side effects   Outcome: Progressing  Goal: Attend and participate in unit activities, including therapeutic, recreational, and educational groups  Description: Interventions:  - Provide therapeutic and educational activities daily, encourage attendance and participation, and document same in the medical record  - Obtain collateral information, encourage visitation and family involvement in care Outcome: Progressing     Problem: Risk for Violence/Aggression Toward Others  Goal: Refrain from harming others  Outcome: Progressing  Goal: Refrain from destructive acts on the environment or property  Outcome: Progressing  Goal: Control angry outbursts  Description: Interventions:  - Monitor patient closely, per order  - Ensure early verbal de-escalation  - Monitor prn medication needs  - Set reasonable/therapeutic limits, outline behavioral expectations, and consequences   - Provide a non-threatening milieu, utilizing the least restrictive interventions   Outcome: Progressing     Problem: DISCHARGE PLANNING  Goal: Discharge to home or other facility with appropriate resources  Description: INTERVENTIONS:  - Identify barriers to discharge w/patient and caregiver  - Arrange for needed discharge resources and transportation as appropriate  - Identify discharge learning needs (meds, wound care, etc )  - Arrange for interpretive services to assist at discharge as needed  - Refer to Case Management Department for coordinating discharge planning if the patient needs post-hospital services based on physician/advanced practitioner order or complex needs related to functional status, cognitive ability, or social support system  Outcome: Progressing     Problem: Nutrition/Hydration-ADULT  Goal: Nutrient/Hydration intake appropriate for improving, restoring or maintaining nutritional needs  Description: Monitor and assess patient's nutrition/hydration status for malnutrition  Collaborate with interdisciplinary team and initiate plan and interventions as ordered  Monitor patient's weight and dietary intake as ordered or per policy  Utilize nutrition screening tool and intervene as necessary  Determine patient's food preferences and provide high-protein, high-caloric foods as appropriate       INTERVENTIONS:  - Monitor oral intake, urinary output, labs, and treatment plans  - Assess nutrition and hydration status and recommend course of action  - Evaluate amount of meals eaten  - Assist patient with eating if necessary   - Allow adequate time for meals  - Recommend/ encourage appropriate diets, oral nutritional supplements, and vitamin/mineral supplements  - Order, calculate, and assess calorie counts as needed  - Recommend, monitor, and adjust tube feedings and TPN/PPN based on assessed needs  - Assess need for intravenous fluids  - Provide specific nutrition/hydration education as appropriate  - Include patient/family/caregiver in decisions related to nutrition  Outcome: Progressing

## 2022-01-08 NOTE — TREATMENT TEAM
01/08/22 0700   Team Meeting   Meeting Type Daily Rounds   Team Members Present   Team Members Present Physician;Nurse   Physician Team Member Dr Stephanie Mcfarlane   Nursing Team Member 31 Raeann Wahl   Patient/Family Present   Patient Present No   Patient's Family Present No     Daily Rounds: Pt received clothes back yesterday, consenting for safety  72 hour notice signed 1/7/22 at 1055  Depakote level on Sunday  Hopeful for d/c on Monday

## 2022-01-08 NOTE — PLAN OF CARE
Problem: SELF HARM/SUICIDALITY  Goal: Will have no self-injury during hospital stay  Description: INTERVENTIONS:  - Q 15 MINUTES: Routine safety checks  - Q WAKING SHIFT & PRN: Assess risk to determine if routine checks are adequate to maintain patient safety  - Encourage patient to participate actively in care by formulating a plan to combat response to suicidal ideation, identify supports and resources  Outcome: Progressing     Problem: DEPRESSION  Goal: Will be euthymic at discharge  Description: INTERVENTIONS:  - Administer medication as ordered  - Provide emotional support via 1:1 interaction with staff  - Encourage involvement in milieu/groups/activities  - Monitor for social isolation  Outcome: Progressing     Problem: ANXIETY  Goal: Will report anxiety at manageable levels  Description: INTERVENTIONS:  - Administer medication as ordered  - Teach and encourage coping skills  - Provide emotional support  - Assess patient/family for anxiety and ability to cope  Outcome: Progressing  Goal: By discharge: Patient will verbalize 2 strategies to deal with anxiety  Description: Interventions:  - Identify any obvious source/trigger to anxiety  - Staff will assist patient in applying identified coping technique/skills  - Encourage attendance of scheduled groups and activities  Outcome: Progressing     Problem: Risk for Self Injury/Neglect  Goal: Refrain from harming self  Description: Interventions:  - Monitor patient closely, per order  - Develop a trusting relationship  - Supervise medication ingestion, monitor effects and side effects   Outcome: Progressing  Goal: Attend and participate in unit activities, including therapeutic, recreational, and educational groups  Description: Interventions:  - Provide therapeutic and educational activities daily, encourage attendance and participation, and document same in the medical record  - Obtain collateral information, encourage visitation and family involvement in care Outcome: Progressing     Problem: Risk for Violence/Aggression Toward Others  Goal: Refrain from harming others  Outcome: Progressing  Goal: Refrain from destructive acts on the environment or property  Outcome: Progressing  Goal: Control angry outbursts  Description: Interventions:  - Monitor patient closely, per order  - Ensure early verbal de-escalation  - Monitor prn medication needs  - Set reasonable/therapeutic limits, outline behavioral expectations, and consequences   - Provide a non-threatening milieu, utilizing the least restrictive interventions   Outcome: Progressing     Problem: SAFETY ADULT  Goal: Patient will remain free of falls  Description: INTERVENTIONS:  - Keep Call bell within reach  - Keep bed low and locked with side rails adjusted as appropriate  - Keep care items and personal belongings within reach  - Initiate and maintain comfort rounds  - Make Fall Risk Sign visible to staff  - Apply yellow socks and bracelet for high fall risk patients  - Consider moving patient to room near nurses station  Outcome: Progressing     Problem: Nutrition/Hydration-ADULT  Goal: Nutrient/Hydration intake appropriate for improving, restoring or maintaining nutritional needs  Description: Monitor and assess patient's nutrition/hydration status for malnutrition  Collaborate with interdisciplinary team and initiate plan and interventions as ordered  Monitor patient's weight and dietary intake as ordered or per policy  Utilize nutrition screening tool and intervene as necessary  Determine patient's food preferences and provide high-protein, high-caloric foods as appropriate       INTERVENTIONS:  - Monitor oral intake, urinary output, labs, and treatment plans  - Assess nutrition and hydration status and recommend course of action  - Evaluate amount of meals eaten  - Assist patient with eating if necessary   - Allow adequate time for meals  - Recommend/ encourage appropriate diets, oral nutritional supplements, and vitamin/mineral supplements  - Order, calculate, and assess calorie counts as needed  - Recommend, monitor, and adjust tube feedings and TPN/PPN based on assessed needs  - Assess need for intravenous fluids  - Provide specific nutrition/hydration education as appropriate  - Include patient/family/caregiver in decisions related to nutrition  Outcome: Progressing

## 2022-01-08 NOTE — PROGRESS NOTES
Progress Note - Behavioral Health   Parminder Squires 49 y o  female MRN: [id number]  Unit/Bed#: [facility] 544-68 Encounter: [id number]    Assessment/Plan   Principal Problem:    Schizoaffective disorder, bipolar type ([facility] 75 )  Active Problems:    Benign essential hypertension    Chronic low back pain    Edema    Hypothyroidism    MAG (obstructive sleep apnea)    Overactive bladder    Primary osteoarthritis of both knees    Marijuana abuse    COPD (chronic obstructive pulmonary disease) ([facility])    Class 3 severe obesity due to excess calories with serious comorbidity and body mass index (BMI) of 40 0 to 44 9 in [facility])    Substance abuse ([facility])    GERD (gastroesophageal reflux disease)    Diarrhea    Ecchymosis    Anxiety    Borderline personality disorder ([place] [id number] )      Recommended Treatment:    Continue current medications    Continue with group therapy, milieu therapy and occupational therapy  Continue frequent safety checks and vitals per unit protocol  Case discussed with treatment team   Risks, benefits and possible side effects of Medications: Risks, benefits, and possible side effects of medications have previously been explained  No new medications at this time  ------------------------------------------------------------    Subjective: Per nursing report, Bouchra Barrios has been somewhat impulsive but overall cooperative on the unit and compliant with medications  Today, Bouchra Barrios is consenting for safety on the unit  She reports feeling somewhat tired today, which she attributes to her medications  She reports some passive thoughts about death but denies any suicidal plan or intent, stating that she would not want to harm herself because that would hurt her father  She reports good sleep overnight  We discussed possibly decreasing some of her sedating medications given that she reports feeling quite tired over the last couple of days, and she was amenable to this      Progress Toward Goals: slow improvement    Psychiatric Review of Systems:  Behavior over the last 24 hours: unchanged  Sleep: improving  Appetite: adequate  Medication side effects: as noted above  ROS: Complete review of systems is negative except as noted above      Vital signs in last 24 hours:  Temp:  [96 8 °F (36 °C)] 96 8 °F (36 °C)  HR:  [58-80] 58  Resp:  [17] 17  BP: (111-130)/(55-61) 111/55    Mental Status Exam:  Appearance:  alert, good eye contact, appears stated age, casually dressed and appropriate grooming and hygiene   Behavior:  calm and cooperative   Motor: no abnormal movements and normal gait and balance   Speech:  spontaneous and coherent   Mood:  "Tired"   Affect:  constricted and dysphoric   Thought Process:  Organized, logical, goal-directed   Thought Content: no verbalized delusions or overt paranoia   Perceptual disturbances: no reported hallucinations and does not appear to be responding to internal stimuli at this time   Risk Potential: No active suicidal ideation, No active homicidal ideation   Cognition: oriented to self and situation and cognition not formally tested   Insight:  Limited   Judgment: Improving     Current Medications:  Current Facility-Administered Medications   Medication Dose Route Frequency Provider Last Rate    albuterol  2 puff Inhalation Q4H PRN Dalila Aldana MD      aluminum-magnesium hydroxide-simethicone  30 mL Oral Q4H PRN Susie Reeks, PA-C      artificial tear  1 application Both Eyes C5Y PRN Susie Reeks, PA-C      haloperidol lactate  2 5 mg Intramuscular Q6H PRN Max 4/day Susie Reeks, PA-C      And    LORazepam  1 mg Intramuscular Q6H PRN Max 4/day Susie Reeks, PA-C      And    benztropine  0 5 mg Intramuscular Q6H PRN Max 4/day Susie Reeks, PA-C      haloperidol lactate  5 mg Intramuscular Q4H PRN Max 4/day Susie Reeks, PA-C      And    LORazepam  2 mg Intramuscular Q4H PRN Max 4/day Susie Reeks, PA-C      And    benztropine  1 mg Intramuscular Q4H PRN Max 4/day Allyne Hand, PA-C      benztropine  1 mg Intramuscular Q4H PRN Max 6/day Allyne Hand, PA-C      benztropine  1 mg Oral Q4H PRN Max 6/day Allyne Hand, PA-C      bisacodyl  10 mg Rectal Daily PRN Allyne Hand, PA-C      buPROPion  300 mg Oral Daily Allyne Hand, PA-C      colestipol  1 g Oral BID Sherryle Abu [place], [place]      hydrOXYzine HCL  50 mg Oral Q6H PRN Max 4/day Allyne Hand, PA-C      Or    diphenhydrAMINE  50 mg Intramuscular Q6H PRN Allyne Hand, PA-C      divalproex sodium  750 mg Oral Daily Pebbles Deleon MD      furosemide  20 mg Oral Daily Allyne Hand, PA-C      gabapentin  100 mg Oral TID Jazmin Linton MD      haloperidol  2 mg Oral Q4H PRN Max 6/day Allyne Hand, PA-C      haloperidol  5 mg Oral Q6H PRN Max 4/day Allyne Hand, PA-C      haloperidol  5 mg Oral Q4H PRN Max 4/day Allyne Hand, PA-C      haloperidol  5 mg Oral HS Pebbles Deleon MD      hydrOXYzine HCL  100 mg Oral Q6H PRN Max 4/day Allyne Hand, PA-C      Or    LORazepam  2 mg Intramuscular Q6H PRN Allyne Hand, PA-C      hydrOXYzine HCL  25 mg Oral Q6H PRN Max 4/day Allyne Hand, PA-C      ibuprofen  400 mg Oral Q4H PRN Allyne Hand, PA-C      ibuprofen  600 mg Oral Q6H PRN Allyne Hand, PA-C      ibuprofen  800 mg Oral Q8H PRN Allyne Hand, PA-C      levothyroxine  75 mcg Oral Early Morning Allyne Hand, PA-C      loperamide  2 mg Oral TID PRN Allyne Hand, PA-C      losartan  50 mg Oral Daily Allyne Hand, PA-C      lurasidone  60 mg Oral Daily With Michael Carmen MD      melatonin  3 mg Oral HS Allyne Hand, PA-C      metoprolol tartrate  25 mg Oral Q12H [address] Allyne Hand, PA-C      pantoprazole  20 mg Oral Early Morning [facility] [place], [place]      polyethylene glycol  17 g Oral Daily PRN Allyne Hand, PA-C      senna-docusate sodium  1 tablet Oral Daily PRN Beverly Bernheim, PA-C      topiramate  100 mg Oral BID Beverly Bernheim, PA-C      traZODone  150 mg Oral HS Sj Abdi MD      traZODone  50 mg Oral HS PRN Beverly Bernheim, PA-C         Behavioral Health Medications: all current active meds have been reviewed  Changes as in plan section above  Laboratory results:  I have personally reviewed all pertinent laboratory/tests results  No results found for this or any previous visit (from the past 48 hour(s))       Sj Abdi MD

## 2022-01-08 NOTE — NURSING NOTE
Calm and cooperative  Mostly isolative to self/room, OOB for meals  Reports sleeping well over night  Denies SI/HI/AVH and verbally contracts for safety  Denies concerns at this time

## 2022-01-09 LAB — VALPROATE SERPL-MCNC: 51 UG/ML (ref 50–100)

## 2022-01-09 PROCEDURE — 99232 SBSQ HOSP IP/OBS MODERATE 35: CPT | Performed by: PSYCHIATRY & NEUROLOGY

## 2022-01-09 PROCEDURE — 80164 ASSAY DIPROPYLACETIC ACD TOT: CPT | Performed by: STUDENT IN AN ORGANIZED HEALTH CARE EDUCATION/TRAINING PROGRAM

## 2022-01-09 RX ORDER — DOCUSATE SODIUM 100 MG/1
100 CAPSULE, LIQUID FILLED ORAL 2 TIMES DAILY PRN
Status: DISCONTINUED | OUTPATIENT
Start: 2022-01-09 | End: 2022-01-10 | Stop reason: HOSPADM

## 2022-01-09 RX ADMIN — DOCUSATE SODIUM 100 MG: 100 CAPSULE, LIQUID FILLED ORAL at 15:03

## 2022-01-09 RX ADMIN — BUPROPION HYDROCHLORIDE 300 MG: 300 TABLET, FILM COATED, EXTENDED RELEASE ORAL at 09:30

## 2022-01-09 RX ADMIN — LURASIDONE HYDROCHLORIDE 60 MG: 40 TABLET, FILM COATED ORAL at 15:59

## 2022-01-09 RX ADMIN — Medication 3 MG: at 21:19

## 2022-01-09 RX ADMIN — METOPROLOL TARTRATE 25 MG: 25 TABLET, FILM COATED ORAL at 21:19

## 2022-01-09 RX ADMIN — TRAZODONE HYDROCHLORIDE 150 MG: 150 TABLET ORAL at 21:19

## 2022-01-09 RX ADMIN — LEVOTHYROXINE SODIUM 75 MCG: 75 TABLET ORAL at 06:29

## 2022-01-09 RX ADMIN — FUROSEMIDE 20 MG: 20 TABLET ORAL at 09:32

## 2022-01-09 RX ADMIN — DIVALPROEX SODIUM 750 MG: 250 TABLET, FILM COATED, EXTENDED RELEASE ORAL at 09:33

## 2022-01-09 RX ADMIN — GABAPENTIN 100 MG: 100 CAPSULE ORAL at 09:29

## 2022-01-09 RX ADMIN — GABAPENTIN 100 MG: 100 CAPSULE ORAL at 15:59

## 2022-01-09 RX ADMIN — GABAPENTIN 100 MG: 100 CAPSULE ORAL at 21:19

## 2022-01-09 RX ADMIN — METOPROLOL TARTRATE 25 MG: 25 TABLET, FILM COATED ORAL at 09:33

## 2022-01-09 RX ADMIN — PANTOPRAZOLE SODIUM 20 MG: 20 TABLET, DELAYED RELEASE ORAL at 06:29

## 2022-01-09 RX ADMIN — HALOPERIDOL 5 MG: 5 TABLET ORAL at 21:18

## 2022-01-09 RX ADMIN — LOSARTAN POTASSIUM 50 MG: 50 TABLET, FILM COATED ORAL at 09:33

## 2022-01-09 RX ADMIN — TOPIRAMATE 100 MG: 100 TABLET, FILM COATED ORAL at 09:34

## 2022-01-09 RX ADMIN — IBUPROFEN 800 MG: 800 TABLET ORAL at 09:40

## 2022-01-09 RX ADMIN — TOPIRAMATE 100 MG: 100 TABLET, FILM COATED ORAL at 17:21

## 2022-01-09 NOTE — PROGRESS NOTES
Progress Note - Behavioral Health   Blair Barboza 49 y o  female MRN: [id number]  Unit/Bed#: [facility] 869-56 Encounter: [id number]    Assessment/Plan   Principal Problem:    Schizoaffective disorder, bipolar type ([facility] 75 )  Active Problems:    Benign essential hypertension    Chronic low back pain    Edema    Hypothyroidism    MAG (obstructive sleep apnea)    Overactive bladder    Primary osteoarthritis of both knees    Marijuana abuse    COPD (chronic obstructive pulmonary disease) ([facility])    Class 3 severe obesity due to excess calories with serious comorbidity and body mass index (BMI) of 40 0 to 44 9 in [facility])    Substance abuse ([facility])    GERD (gastroesophageal reflux disease)    Diarrhea    Ecchymosis    Anxiety    Borderline personality disorder ([place] [id number] )      Recommended Treatment:    Continue current medications    Continue with group therapy, milieu therapy and occupational therapy  Continue frequent safety checks and vitals per unit protocol  Case discussed with treatment team   Risks, benefits and possible side effects of Medications: Risks, benefits, and possible side effects of medications have previously been explained  No new medications at this time  ------------------------------------------------------------    Subjective: Per nursing report, Fariha Campos has been somewhat irritable and isolative cooperative on the unit and compliant with medications  Today, Fariha Campos is consenting for safety on the unit  She reports good sleep overnight and is feeling less tired today since decreasing medication some of the medication doses yesterday  She denies any suicidal or homicidal thoughts today  He denies any acute complaints  Progress Toward Goals: slow improvement    Psychiatric Review of Systems:  Behavior over the last 24 hours: unchanged  Sleep: improving  Appetite: adequate  Medication side effects: none verbalized  ROS: Complete review of systems is negative except as noted above      Vital signs in last 24 hours:   Temp:  [97 4 °F (36 3 °C)-97 8 °F (36 6 °C)] 97 8 °F (36 6 °C)  HR:  [63-65] 65  Resp:  [16-17] 17  BP: (103-127)/(53-61) 109/59    Mental Status Exam:  Appearance:  alert, good eye contact, appears stated age, casually dressed, appropriate grooming and hygiene and obese   Behavior:  calm and cooperative   Motor: no abnormal movements and normal gait and balance   Speech:  spontaneous and coherent   Mood:  anxious   Affect:  mood-congruent and constricted   Thought Process:  Organized, logical, goal-directed   Thought Content: no verbalized delusions or overt paranoia   Perceptual disturbances: no reported hallucinations and does not appear to be responding to internal stimuli at this time   Risk Potential: No active suicidal ideation, No active homicidal ideation   Cognition: oriented to self and situation, appears to be of average intelligence and cognition not formally tested   Insight:  Limited   Judgment: Limited     Current Medications:  Current Facility-Administered Medications   Medication Dose Route Frequency Provider Last Rate    albuterol  2 puff Inhalation Q4H PRN Dalila Aldana MD      aluminum-magnesium hydroxide-simethicone  30 mL Oral Q4H PRN Susie Reeks, PA-C      artificial tear  1 application Both Eyes Y2Q PRN Susie Reeks, PA-C      haloperidol lactate  2 5 mg Intramuscular Q6H PRN Max 4/day Susie Reeks, PA-C      And    LORazepam  1 mg Intramuscular Q6H PRN Max 4/day Susie Reeks, PA-C      And    benztropine  0 5 mg Intramuscular Q6H PRN Max 4/day Susie Reeks, PA-C      haloperidol lactate  5 mg Intramuscular Q4H PRN Max 4/day Susie Reeks, PA-C      And    LORazepam  2 mg Intramuscular Q4H PRN Max 4/day Susie Reeks, PA-C      And    benztropine  1 mg Intramuscular Q4H PRN Max 4/day Susie Reeks, PA-C      benztropine  1 mg Intramuscular Q4H PRN Max 6/day Susie Reeks, PA-C      benztropine  1 mg Oral Q4H PRN Max 6/day Patrice Rosado Devora Boeck, PA-LUIS      bisacodyl  10 mg Rectal Daily PRN Cloteal Stallion, PA-C      buPROPion  300 mg Oral Daily [facility], [place]      colestipol  1 g Oral BID Ysabel Burkett [place], [place]      hydrOXYzine HCL  50 mg Oral Q6H PRN Max 4/day Cloteal Stallion, PA-C      Or    diphenhydrAMINE  50 mg Intramuscular Q6H PRN Cloteal Stallion, PA-C      divalproex sodium  750 mg Oral Daily Wendie Tracy MD      furosemide  20 mg Oral Daily Cloteal Stallion, PA-C      gabapentin  100 mg Oral TID Severiano Plumb, MD      haloperidol  2 mg Oral Q4H PRN Max 6/day Cloteal Stallion, PA-C      haloperidol  5 mg Oral Q6H PRN Max 4/day Cloteal Stallion, PA-C      haloperidol  5 mg Oral Q4H PRN Max 4/day Cloteal Stallion, PA-C      haloperidol  5 mg Oral HS Wendie Tracy MD      hydrOXYzine HCL  100 mg Oral Q6H PRN Max 4/day Cloteal Stallion, PA-C      Or    LORazepam  2 mg Intramuscular Q6H PRN Cloteal Stallion, PA-C      hydrOXYzine HCL  25 mg Oral Q6H PRN Max 4/day Cloteal Stallion, PA-C      ibuprofen  400 mg Oral Q4H PRN Cloteal Stallion, PA-C      ibuprofen  600 mg Oral Q6H PRN Cloteal Stallion, PA-C      ibuprofen  800 mg Oral Q8H PRN Cloteal Stallion, PA-C      levothyroxine  75 mcg Oral Early Morning Cloteal Stallion, PA-C      loperamide  2 mg Oral TID PRN Cloteal Stallion, PA-C      losartan  50 mg Oral Daily Cloteal Stallion, PA-C      lurasidone  60 mg Oral Daily With Chema Monday, MD      melatonin  3 mg Oral HS Cloteal Stallion, PA-C      metoprolol tartrate  25 mg Oral Q12H [facility] Cloteal Stallion, PA-C      pantoprazole  20 mg Oral Early Morning Ysabel Burkett [place], [place]      polyethylene glycol  17 g Oral Daily PRN Cloteal Stallion, PA-C      senna-docusate sodium  1 tablet Oral Daily PRN Cloteal Stallion, PA-C      topiramate  100 mg Oral BID Cloteal Stallion, PA-C      traZODone  150 mg Oral HS Severiano Plumb, MD      traZODone  50 mg Oral HS PRN Voncille Overall, PA-C         Behavioral Health Medications: all current active meds have been reviewed  Changes as in plan section above  Laboratory results:  I have personally reviewed all pertinent laboratory/tests results    Recent Results (from the past 48 hour(s))   Valproic acid level, total    Collection Time: 01/09/22  6:51 AM   Result Value Ref Range    Valproic Acid, Total 51 50 - 100 ug/mL        Shayne Huizar MD

## 2022-01-09 NOTE — NURSING NOTE
Scant, guarded, irritable  Isolative to room, OOB for meals only  Denies SI, verbally contracts for safety  Denies HI/AVH  No behavioral issue thus far  Compliant with medications

## 2022-01-09 NOTE — TREATMENT TEAM
01/09/22 0800   Team Meeting   Meeting Type Daily Rounds   Team Members Present   Team Members Present Physician;Nurse   Physician Team Member Dr Margi Hand   Nursing Team Member RV   Patient/Family Present   Patient Present No   Patient's Family Present No     AM rounds: Isolative and scant  Sleeping throughout the day, irritable edge  Tearful and refusing to eat dinner d/t kayla not arriving on her tray  Offered numerous options to compensate, Pt continued to refuse to eat rest of meal provided  Pt ate meal when all peers had finished, was informed that future meals would not be held and she would be asked to leave dining room if not eating meal   Returned to room after  Denies SI/HI/AVH    Slept

## 2022-01-10 VITALS
HEART RATE: 62 BPM | OXYGEN SATURATION: 96 % | BODY MASS INDEX: 43.05 KG/M2 | SYSTOLIC BLOOD PRESSURE: 115 MMHG | DIASTOLIC BLOOD PRESSURE: 59 MMHG | WEIGHT: 267.86 LBS | RESPIRATION RATE: 18 BRPM | TEMPERATURE: 96.2 F | HEIGHT: 66 IN

## 2022-01-10 PROCEDURE — 99238 HOSP IP/OBS DSCHRG MGMT 30/<: CPT | Performed by: STUDENT IN AN ORGANIZED HEALTH CARE EDUCATION/TRAINING PROGRAM

## 2022-01-10 RX ORDER — PANTOPRAZOLE SODIUM 20 MG/1
20 TABLET, DELAYED RELEASE ORAL
Qty: 30 TABLET | Refills: 0 | Status: SHIPPED | OUTPATIENT
Start: 2022-01-10 | End: 2022-02-10 | Stop reason: ALTCHOICE

## 2022-01-10 RX ORDER — BUPROPION HYDROCHLORIDE 300 MG/1
300 TABLET ORAL DAILY
Qty: 30 TABLET | Refills: 0 | Status: ON HOLD | OUTPATIENT
Start: 2022-01-10 | End: 2022-07-14 | Stop reason: CLARIF

## 2022-01-10 RX ORDER — GABAPENTIN 100 MG/1
100 CAPSULE ORAL 3 TIMES DAILY
Qty: 60 CAPSULE | Refills: 0 | Status: SHIPPED | OUTPATIENT
Start: 2022-01-10 | End: 2022-02-10 | Stop reason: ALTCHOICE

## 2022-01-10 RX ORDER — LEVOTHYROXINE SODIUM 0.07 MG/1
75 TABLET ORAL
Qty: 30 TABLET | Refills: 0 | Status: SHIPPED | OUTPATIENT
Start: 2022-01-10 | End: 2022-03-30 | Stop reason: SDUPTHER

## 2022-01-10 RX ORDER — TRAZODONE HYDROCHLORIDE 150 MG/1
150 TABLET ORAL
Qty: 60 TABLET | Refills: 0 | Status: SHIPPED | OUTPATIENT
Start: 2022-01-10 | End: 2022-01-18

## 2022-01-10 RX ORDER — LOSARTAN POTASSIUM 50 MG/1
50 TABLET ORAL DAILY
Qty: 30 TABLET | Refills: 0 | Status: SHIPPED | OUTPATIENT
Start: 2022-01-10 | End: 2022-05-24

## 2022-01-10 RX ORDER — ALBUTEROL SULFATE 90 UG/1
2 AEROSOL, METERED RESPIRATORY (INHALATION) EVERY 6 HOURS PRN
Qty: 8 G | Refills: 0 | Status: SHIPPED | OUTPATIENT
Start: 2022-01-10 | End: 2022-02-10

## 2022-01-10 RX ORDER — MONTELUKAST SODIUM 4 MG/1
1 TABLET, CHEWABLE ORAL 2 TIMES DAILY
Qty: 60 TABLET | Refills: 0 | Status: SHIPPED | OUTPATIENT
Start: 2022-01-10 | End: 2022-07-15

## 2022-01-10 RX ORDER — DIVALPROEX SODIUM 250 MG/1
750 TABLET, EXTENDED RELEASE ORAL DAILY
Qty: 90 TABLET | Refills: 0 | Status: SHIPPED | OUTPATIENT
Start: 2022-01-10 | End: 2022-07-15

## 2022-01-10 RX ORDER — LANOLIN ALCOHOL/MO/W.PET/CERES
3 CREAM (GRAM) TOPICAL
Qty: 30 TABLET | Refills: 0 | Status: SHIPPED | OUTPATIENT
Start: 2022-01-10 | End: 2022-02-10 | Stop reason: ALTCHOICE

## 2022-01-10 RX ORDER — TRAZODONE HYDROCHLORIDE 100 MG/1
200 TABLET ORAL
Qty: 60 TABLET | Refills: 0 | Status: SHIPPED | OUTPATIENT
Start: 2022-01-10 | End: 2022-01-10 | Stop reason: HOSPADM

## 2022-01-10 RX ORDER — HALOPERIDOL 5 MG
5 TABLET ORAL
Qty: 30 TABLET | Refills: 0 | Status: SHIPPED | OUTPATIENT
Start: 2022-01-10 | End: 2022-02-10 | Stop reason: ALTCHOICE

## 2022-01-10 RX ORDER — FUROSEMIDE 20 MG/1
20 TABLET ORAL DAILY
Qty: 30 TABLET | Refills: 0 | Status: SHIPPED | OUTPATIENT
Start: 2022-01-10 | End: 2022-04-27

## 2022-01-10 RX ORDER — TOPIRAMATE 100 MG/1
100 TABLET, FILM COATED ORAL 2 TIMES DAILY
Qty: 60 TABLET | Refills: 0 | Status: SHIPPED | OUTPATIENT
Start: 2022-01-10 | End: 2022-07-28

## 2022-01-10 RX ADMIN — GABAPENTIN 100 MG: 100 CAPSULE ORAL at 08:51

## 2022-01-10 RX ADMIN — METOPROLOL TARTRATE 25 MG: 25 TABLET, FILM COATED ORAL at 08:51

## 2022-01-10 RX ADMIN — DIVALPROEX SODIUM 750 MG: 250 TABLET, FILM COATED, EXTENDED RELEASE ORAL at 08:50

## 2022-01-10 RX ADMIN — FUROSEMIDE 20 MG: 20 TABLET ORAL at 08:51

## 2022-01-10 RX ADMIN — MONTELUKAST SODIUM 1 G: 4 TABLET, CHEWABLE ORAL at 08:50

## 2022-01-10 RX ADMIN — TOPIRAMATE 100 MG: 100 TABLET, FILM COATED ORAL at 08:50

## 2022-01-10 RX ADMIN — LEVOTHYROXINE SODIUM 75 MCG: 75 TABLET ORAL at 05:54

## 2022-01-10 RX ADMIN — BUPROPION HYDROCHLORIDE 300 MG: 300 TABLET, FILM COATED, EXTENDED RELEASE ORAL at 08:50

## 2022-01-10 RX ADMIN — LOSARTAN POTASSIUM 50 MG: 50 TABLET, FILM COATED ORAL at 08:51

## 2022-01-10 RX ADMIN — PANTOPRAZOLE SODIUM 20 MG: 20 TABLET, DELAYED RELEASE ORAL at 05:54

## 2022-01-10 NOTE — PLAN OF CARE
Problem: SELF HARM/SUICIDALITY  Goal: Will have no self-injury during hospital stay  Description: INTERVENTIONS:  - Q 15 MINUTES: Routine safety checks  - Q WAKING SHIFT & PRN: Assess risk to determine if routine checks are adequate to maintain patient safety  - Encourage patient to participate actively in care by formulating a plan to combat response to suicidal ideation, identify supports and resources  1/9/2022 2119 by Claribel Schultz RN  Outcome: Progressing  1/9/2022 2118 by Claribel Schultz RN  Outcome: Progressing     Problem: DEPRESSION  Goal: Will be euthymic at discharge  Description: INTERVENTIONS:  - Administer medication as ordered  - Provide emotional support via 1:1 interaction with staff  - Encourage involvement in milieu/groups/activities  - Monitor for social isolation  1/9/2022 2119 by Claribel Schultz RN  Outcome: Progressing  1/9/2022 2118 by Claribel Schultz RN  Outcome: Progressing     Problem: ANXIETY  Goal: Will report anxiety at manageable levels  Description: INTERVENTIONS:  - Administer medication as ordered  - Teach and encourage coping skills  - Provide emotional support  - Assess patient/family for anxiety and ability to cope  1/9/2022 2119 by Claribel Schultz RN  Outcome: Progressing  1/9/2022 2118 by Claribel Schultz RN  Outcome: Progressing  Goal: By discharge: Patient will verbalize 2 strategies to deal with anxiety  Description: Interventions:  - Identify any obvious source/trigger to anxiety  - Staff will assist patient in applying identified coping technique/skills  - Encourage attendance of scheduled groups and activities  1/9/2022 2119 by Claribel Schultz RN  Outcome: Progressing  1/9/2022 2118 by Claribel Schultz RN  Outcome: Progressing     Problem: Risk for Self Injury/Neglect  Goal: Refrain from harming self  Description: Interventions:  - Monitor patient closely, per order  - Develop a trusting relationship  - Supervise medication ingestion, monitor effects and side effects   1/9/2022 2119 by Janis Wheeler RN  Outcome: Progressing  1/9/2022 2118 by Janis Wheeler RN  Outcome: Progressing  Goal: Attend and participate in unit activities, including therapeutic, recreational, and educational groups  Description: Interventions:  - Provide therapeutic and educational activities daily, encourage attendance and participation, and document same in the medical record  - Obtain collateral information, encourage visitation and family involvement in care   1/9/2022 2119 by Janis Wheeler RN  Outcome: Progressing  1/9/2022 2118 by Janis Wheeler RN  Outcome: Progressing     Problem: Risk for Violence/Aggression Toward Others  Goal: Refrain from harming others  1/9/2022 2119 by Janis Wheeler RN  Outcome: Progressing  1/9/2022 2118 by Janis Wheeler RN  Outcome: Progressing  Goal: Refrain from destructive acts on the environment or property  1/9/2022 2119 by Janis Wheeler RN  Outcome: Progressing  1/9/2022 2118 by Janis Wheeler RN  Outcome: Progressing  Goal: Control angry outbursts  Description: Interventions:  - Monitor patient closely, per order  - Ensure early verbal de-escalation  - Monitor prn medication needs  - Set reasonable/therapeutic limits, outline behavioral expectations, and consequences   - Provide a non-threatening milieu, utilizing the least restrictive interventions   1/9/2022 2119 by Janis Wheeler RN  Outcome: Progressing  1/9/2022 2118 by Janis Wheeler RN  Outcome: Progressing     Problem: SAFETY ADULT  Goal: Patient will remain free of falls  Description: INTERVENTIONS:  - Keep Call bell within reach  - Keep bed low and locked with side rails adjusted as appropriate  - Keep care items and personal belongings within reach  - Initiate and maintain comfort rounds  - Make Fall Risk Sign visible to staff  - Apply yellow socks and bracelet for high fall risk patients  - Consider moving patient to room near nurses station  1/9/2022 2119 by Janis Wheeler RN  Outcome: Progressing  1/9/2022 2118 by Inna Barreto Matthew Gomez RN  Outcome: Progressing     Problem: Nutrition/Hydration-ADULT  Goal: Nutrient/Hydration intake appropriate for improving, restoring or maintaining nutritional needs  Description: Monitor and assess patient's nutrition/hydration status for malnutrition  Collaborate with interdisciplinary team and initiate plan and interventions as ordered  Monitor patient's weight and dietary intake as ordered or per policy  Utilize nutrition screening tool and intervene as necessary  Determine patient's food preferences and provide high-protein, high-caloric foods as appropriate       INTERVENTIONS:  - Monitor oral intake, urinary output, labs, and treatment plans  - Assess nutrition and hydration status and recommend course of action  - Evaluate amount of meals eaten  - Assist patient with eating if necessary   - Allow adequate time for meals  - Recommend/ encourage appropriate diets, oral nutritional supplements, and vitamin/mineral supplements  - Order, calculate, and assess calorie counts as needed  - Recommend, monitor, and adjust tube feedings and TPN/PPN based on assessed needs  - Assess need for intravenous fluids  - Provide specific nutrition/hydration education as appropriate  - Include patient/family/caregiver in decisions related to nutrition  1/9/2022 2119 by Kristin Dick RN  Outcome: Progressing  1/9/2022 2118 by Kristin Dick RN  Outcome: Progressing

## 2022-01-10 NOTE — CASE MANAGEMENT
CM found Pt sitting in the day room, with her head down, crying  Pt not responding when CM inquired what was wrong  Peer brought Pt tissues & she finally responded to CM & agreed to go to the quiet room to talk  Pt reported she got upset, because she had asked if she would be able to shower after breakfast, & staff told her to hop in the shower quick now  Pt said that she can't rush because of her knees & this was very upsetting to her  CM inquired if she felt she was ready for d/c & Pt said that she did  CM reviewed that Pt has a meeting with Estela Ag at 9 AM, & that she could ask if Pt may shower after that, however, Pt is scheduled for d/c by 10:30  Pt agreed she would just shower when she got home

## 2022-01-10 NOTE — PLAN OF CARE
Problem: SELF HARM/SUICIDALITY  Goal: Will have no self-injury during hospital stay  Description: INTERVENTIONS:  - Q 15 MINUTES: Routine safety checks  - Q WAKING SHIFT & PRN: Assess risk to determine if routine checks are adequate to maintain patient safety  - Encourage patient to participate actively in care by formulating a plan to combat response to suicidal ideation, identify supports and resources  Outcome: Adequate for Discharge     Problem: DEPRESSION  Goal: Will be euthymic at discharge  Description: INTERVENTIONS:  - Administer medication as ordered  - Provide emotional support via 1:1 interaction with staff  - Encourage involvement in milieu/groups/activities  - Monitor for social isolation  Outcome: Adequate for Discharge     Problem: ANXIETY  Goal: Will report anxiety at manageable levels  Description: INTERVENTIONS:  - Administer medication as ordered  - Teach and encourage coping skills  - Provide emotional support  - Assess patient/family for anxiety and ability to cope  Outcome: Adequate for Discharge  Goal: By discharge: Patient will verbalize 2 strategies to deal with anxiety  Description: Interventions:  - Identify any obvious source/trigger to anxiety  - Staff will assist patient in applying identified coping technique/skills  - Encourage attendance of scheduled groups and activities  Outcome: Adequate for Discharge     Problem: Risk for Self Injury/Neglect  Goal: Refrain from harming self  Description: Interventions:  - Monitor patient closely, per order  - Develop a trusting relationship  - Supervise medication ingestion, monitor effects and side effects   Outcome: Adequate for Discharge  Goal: Attend and participate in unit activities, including therapeutic, recreational, and educational groups  Description: Interventions:  - Provide therapeutic and educational activities daily, encourage attendance and participation, and document same in the medical record  - Obtain collateral information, encourage visitation and family involvement in care   Outcome: Adequate for Discharge     Problem: Risk for Violence/Aggression Toward Others  Goal: Refrain from harming others  Outcome: Adequate for Discharge  Goal: Refrain from destructive acts on the environment or property  Outcome: Adequate for Discharge  Goal: Control angry outbursts  Description: Interventions:  - Monitor patient closely, per order  - Ensure early verbal de-escalation  - Monitor prn medication needs  - Set reasonable/therapeutic limits, outline behavioral expectations, and consequences   - Provide a non-threatening milieu, utilizing the least restrictive interventions   Outcome: Adequate for Discharge     Problem: Ineffective Coping  Goal: Participates in unit activities  Description: Interventions:  - Provide therapeutic environment   - Provide required programming   - Redirect inappropriate behaviors   Outcome: Adequate for Discharge     Problem: SAFETY ADULT  Goal: Patient will remain free of falls  Description: INTERVENTIONS:  - Keep Call bell within reach  - Keep bed low and locked with side rails adjusted as appropriate  - Keep care items and personal belongings within reach  - Initiate and maintain comfort rounds  - Make Fall Risk Sign visible to staff  - Apply yellow socks and bracelet for high fall risk patients  - Consider moving patient to room near nurses station  Outcome: Adequate for Discharge     Problem: DISCHARGE PLANNING  Goal: Discharge to home or other facility with appropriate resources  Description: INTERVENTIONS:  - Identify barriers to discharge w/patient and caregiver  - Arrange for needed discharge resources and transportation as appropriate  - Identify discharge learning needs (meds, wound care, etc )  - Arrange for interpretive services to assist at discharge as needed  - Refer to Case Management Department for coordinating discharge planning if the patient needs post-hospital services based on physician/advanced practitioner order or complex needs related to functional status, cognitive ability, or social support system  Outcome: Adequate for Discharge     Problem: Nutrition/Hydration-ADULT  Goal: Nutrient/Hydration intake appropriate for improving, restoring or maintaining nutritional needs  Description: Monitor and assess patient's nutrition/hydration status for malnutrition  Collaborate with interdisciplinary team and initiate plan and interventions as ordered  Monitor patient's weight and dietary intake as ordered or per policy  Utilize nutrition screening tool and intervene as necessary  Determine patient's food preferences and provide high-protein, high-caloric foods as appropriate  INTERVENTIONS:  - Monitor oral intake, urinary output, labs, and treatment plans  - Assess nutrition and hydration status and recommend course of action  - Evaluate amount of meals eaten  - Assist patient with eating if necessary   - Allow adequate time for meals  - Recommend/ encourage appropriate diets, oral nutritional supplements, and vitamin/mineral supplements  - Order, calculate, and assess calorie counts as needed  - Recommend, monitor, and adjust tube feedings and TPN/PPN based on assessed needs  - Assess need for intravenous fluids  - Provide specific nutrition/hydration education as appropriate  - Include patient/family/caregiver in decisions related to nutrition  Outcome: Adequate for Discharge     Expressed readiness for discharge

## 2022-01-10 NOTE — DISCHARGE INSTR - OTHER ORDERS
Crisis Intervention services are available 24 hours a day by calling [phone]  The crisis unit is located at [place] [address]  Services include telephone counseling, mobile crisis, walk-in crisis, and assistance in accessing inpatient care and short-term crisis residential services  The PEER LINE is a toll-free phone number for people in [facility] who are seeking a listening ear for additional support in their recovery from mental illness  The PEER LINE is peer-run and peer-friendly  Callers to the [address] will speak directly to other individuals who have experienced the mental health recovery process  Hours of operation: 8:30 am - 4:30 pm, Monday through Friday [phone])   [facility]  [address] [place] [address]   [address], [address]    Text CONNECT to 718855 from anywhere in the [place], anytime, about any type of crisis  A live, trained Crisis Counselor receives the text and lets you know that they are here to listen  The volunteer Crisis Counselor will help you move from a hot moment to a cool moment  Warm Line: [phone], [phone], [phone]  If it is not quite a crisis, but you want to talk to someone, 24 hours/day, 7 days/week:  Someone to listen; someone who cares  The [facility] on [facility] ([facility]) offers various education & support groups for you & your family  For more information visit their website at http://www [employer]/     Dial 2-1-1 to get connected/get help  Free, confidential information & referral available 24/7: Aging Services, Child & Youth Services, Counseling, Education/Training, Food/Shelter/Clothing, Health Services, Parenting, Substance Abuse, Support Groups, Volunteer Opportunities, & much more    Phone: 2-1-1 or [phone], Web: Chema Dowd, Email: Jose@[employer]    The [address] are open to all mental health consumers in [facility] who are interested in meeting people and making new friends  They provide a friendly social atmosphere with scheduled daily activities including games, arts & crafts, discussion and education groups, vocational activities, and much more  Light refreshments are served daily  The locations are:    [facility] - operated by [address]   [address] [facility] [address]   Phone: [phone]   Fax: [phone]   E-mail: [email or web address]  Hours of Operation:  Monday 3:00 PM - 8:00 PM  Tuesday 12:00 PM - 8:00 PM   Wednesday 3:00 PM - 8:00 PM  Thursday 12:00 PM - 8:00 PM   Friday 3:00 PM - 8:00 PM   Saturday Closed   [facility] - operated by [address] [address]  Phone: [phone]  Fax: [phone]  Hours of Operation  Monday 12:00 PM - 8:00 PM  Tuesday 12:00 PM - 8:00 PM  Wednesday 12:00 PM - 8:00 PM   Thursday 12:00 PM - 8:00 PM  Friday 12:00 PM - 9:00 PM   Saturday 11:00 AM - 4:00 PM  [employer] transportation is available on scheduled days for transportation  Psych Rehab Program:  Modeled after the [address] [employer] program in [place], the [employer] offers consumers interested in fulfilling work a guaranteed place to come, to belong, and to enjoy meaningful relationships as they seek the confidence and skills necessary to lead vocationally productive and socially satisfying lives  Here is their contact information:  [address], [address] [place] [address]  Phone: [phone]  [employer]  com  This site is open Monday thru Thursday from 8:30 am till 4:00 pm and Fridays from 8:30 am till 3:00 pm  Consumers are encouraged to stop by for a visit  After-hour social activities are also scheduled

## 2022-01-10 NOTE — NURSING NOTE
Pt reading in her room at beginning of shift  Denies SI and verbally contracts for safety  Denies hallucinations and SIB  Attends groups and is compliant taking medications  Reports being bored on the unit and is looking forward to being discharged tomorrow  States she works a job on Mondays, Tuesdays, and Fridays  She is hopeful to being back to work on Tuesday  Pt concerned with not being able to make contact with her ACT team  Also having difficult with dealing with grieving her mother passing  This writer encouraged pt to seek a grieving support group within her community  Reports having a psychiatrist that she has not been happy with  States they have not been listening to her and have not been helpful with making adjustment to her medications  She reports feeling less tired today  Reports sleeping well last night  Continues to use her cpap at night  No other questions or concerns at this time

## 2022-01-10 NOTE — TREATMENT TEAM
01/10/22 0900   Activity/Group Checklist   Group Admission/Discharge   Attendance Attended   Attendance Duration (min) 0-15   Interactions Interacted appropriately   Affect/Mood Appropriate;Calm   Goals Achieved Able to engage in interactions     Patient independently completed the relapse prevention plan form  She asked questions when needed  Once completed, she had no further questions, and she transitioned back to her previous activity with no concerns

## 2022-01-10 NOTE — NURSING NOTE
Pleasant during interaction  Brief period of frustration when her ACT team mtg did not start immediately at 9:00am   Expressed readiness for discharge  Excited to return to work and CM provided a return to work note  Improved mood  Denies SI/HI and hallucinations  Encouraged compliance with medications and OP appointments  Discussed healthy coping skills, enjoys talking to friends and father  No questions or concerns

## 2022-01-10 NOTE — DISCHARGE INSTR - APPOINTMENTS
Chapincito Read RN, our [employer], will be calling you after your discharge, on the phone number that you provided  She will be available as an additional support, if needed  If you wish to speak with her, you may contact Afua Baron at [phone]

## 2022-01-10 NOTE — DISCHARGE SUMMARY
Discharge Summary - [address]  Rachel Parra [age] y o  female MRN: [id number]  Unit/Bed#: [facility] 544-83 Encounter: [id number]     Admission Date: 1/5/2022         Discharge Date: 1/10/22    Attending Psychiatrist: No att  providers found    Reason for Admission/HPI:     Per initial H&P from Dr Jesus Alberto Ashton on 1/6/22:    "Per ED provider on 1/4: "Patient is a 36-year-old female with a history of a bipolar disorder and history of self-injury who presents with suicidal ideations  Zoila Hogue states that she is tired of being treated poorly by her providers  Alice Peter states that her psychiatrist, social workers and PCP are not responsive to her needs  Alice Peter states that she was having symptoms of URI last week and got a COVID test  Alice Peter was told by the lab that her test was Leartis Jory was having difficulty getting another order for testing by her PCP, but she finally was retested this morning   She was also texting her psychiatric providers but did not feel that this was enough given her increasing depression and anxiety   She states that she is tired of it all   She had thoughts today of hurting herself  Alice Peter did cut her bilateral forearms and bilateral thighs with a knife   Her plan was to continue cutting   She denies any homicidal ideations  Alice Peter denies any visual or auditory hallucinations  "     Per Crisis worker on 1/4: "Patient is a 49 yr old female who is well known to this examiner from many previous ED visits   Select labs are pending, but she is otherwise medically clear   She arrives via EMS with [facility] calling in advance of her arrival, reporting that today, she reached out to her ACT team and was also frustrated about not getting COVID results to allow her to return to work  Yair Conley her ACT team was not readily responsive, she made a vague threat of harm to self and then stopped responding   Welfare check was initiated, but patient could not be located at home and she was not answering her phone   Police did go out looking for her   Hours later she called to report she was in a [employer] parking lot and had cut her wrists   Patient arrived with superficial cuts / abrasions approximately 1 square inch each on wrists bilaterally   She reports similar marks on her thighs   No need for medical intervention beyond a bandaid   She is selectively mute, refusing to respond and covering her face at times  Bryan Nieto is tearful   She reports she is "just sick of it all"   She was tested for covid on 12/30 after secondary exposure and mild symptoms, and was unable to return to work without a negative test  Love Parkinson, the lab lost her specimen and her reports that her PCP was not responsive to her calls (chart notes indicate she called several times and attempts were made to meet her needs)   She reports that ultimately, she needed another test this morning and does not know the results and is unable to return to work due to this  Jennifer Arias has a very low frustration tolerance and difficulty delay gratification of wants and needs   She admits that she didn't want to live anymore and cut herself intentionally with intent to "keep cutting"   She confirms that she did want to kill herself  Bryan Nieto has history of self-inflicted cutting, suicidal threats and similar behaviors   The patient denies homicidal ideas, plan, intent   When asked about auditory and visual hallucinations, she was hesitant to respond, but then said, "I don't know anymore"   No overt indication that she is responding to internal stimuli and no overt expression of delusional thinking or paranoia   Patient does present as depressed and anxious and endorses same with no details   She reports fluctuations is both sleep and appetite with mild unknown weight loss  [place] Anger is a long history of mental health issues and Axis II features  Bryan Nieto is supported by [facility] ACT team, and is in contact with them several times per week   Stressors include loss of her mother in August and difficulty adjusting to same, recent stressors related to covid testing complications and inability to work, and delay in response of ACT team  Colten Sutter reports she is generally compliant with medications, but did not take them since yesterday morning   ED physician is aware   Select labs pending at present "     This is 48 yo female with hx of Schizaffective disorder, borderline personality, anxiety and substance use followed by ACT team admitted to inpatient unit on voluntary status for worsening of mood, suicidal ideations and cut her wrist and thighs superficially in the context of psychosocial stressors and substance use  Patient endorses multiple stressors recently, not able to return to work due to [place] test and limited help from ACT team  Patient endorses depressed mood, anhedonia, mood swings, irritability, lashing out easily, poor sleep, low energy, hopelessness, guilt and anxiety  On arrival to unit patient was 1:1 as she was not able to contract for safety  She feels safe now and will approach staff if she has any thoughts to hurt self "      Social History     Tobacco History     Smoking Status  Former Smoker Quit date  1/2/2018 Smoking Frequency  3 packs/day Smoking Tobacco Type  Cigarettes    Smokeless Tobacco Use  Never Used    Tobacco Comment  Started at age 14  Alcohol History     Alcohol Use Status  Not Currently Comment  Recovering alcoholic          Drug Use     Drug Use Status  Yes Types  Marijuana, Methamphetamines Comment  medical- vapes 3 times per wk at hs          Sexual Activity     Sexually Active  Not Currently          Activities of Daily Living    Not Asked               Additional Substance Use Detail     Questions Responses    Problems Due to Past Use of Alcohol?  No    Substance Use Assessment Substance use within the past 12 months    Alcohol Use Frequency Denies use in past 12 months    Cannabis frequency 3 or more times/week    Comment: 3 or more times/week on 9/12/2018     Heroin Frequency Denies use in past 12 months    Cannabis method Smoke    Comment: Smoke on 9/12/2018     Cannabis last use     Comment: 9/11/2018 on 9/12/2018 9/11/2018 ->"" on 1/5/2022     Cocaine frequency Never used    Comment: Never used on 12/22/2021     Crack Cocaine Frequency Denies use in past 12 months    Methamphetamine Frequency Experimented    Methamphetamine Method Snort    Methamphetamine Last Use & Amount 1/2/22    Narcotic Frequency Denies use in past 12 months    Benzodiazepine Frequency Denies use in past 12 months    Amphetamine frequency Denies use in past 12 months    Barbituate Frequency Denies use use in past 12 months    Inhalant frequency Never used    Comment: Never used on 7/6/2021     Hallucinogen frequency Never used    Comment: Never used on 7/6/2021     Ecstasy frequency Never used    Comment: Never used on 7/6/2021     Other drug frequency Never used    Comment: Never used on 12/22/2021     Opiate frequency Denies use in past 12 months    Last reviewed by Diana Whiting RN on 1/5/2022          Past Medical History:   Diagnosis Date    Anxiety     Arthritis     oa cassandra knees    Asthma     good control- no medications    Yan's esophagus     Bipolar affective disorder (HCC)     Chronic pain disorder     lumbar    CPAP (continuous positive airway pressure) dependence     Degenerative disc disease at L5-S1 level     Deliberate self-cutting     Depression 09/16/2008    Disease of thyroid gland     hypo    MARTINEZ (dyspnea on exertion)     Drug overdose 10/28/2008    suicide attempt    Dysphagia     Dyspnea     Edema     BLE    GERD (gastroesophageal reflux disease)     High blood pressure     History of COVID-19 12/30/2020    Symptoms started on 12/30/2020 and then got worse  Today she is feeling a little bit better    She is a candidate for monoclonal antibody infusion and set for 1/6/21 @ 1pm [facility]  01/07/21 - telemedicine -     Hypertension     Knee pain, bilateral     Especially right    Migraines     Obese     Overactive bladder     Sjogren's disease ([address] )     Sleep apnea     Stress incontinence     Suicidal ideations     Umbilical hernia     surgical repair today 4/24/2019    Use of cane as ambulatory aid     awaiting b/l knee replacement    Wears glasses      Past Surgical History:   Procedure Laterality Date    BREAST BIOPSY Right 1989    benign    CARPAL TUNNEL RELEASE Left     CHOLECYSTECTOMY  05/2003    Laparoscopic    COLONOSCOPY      01/12/2009    DILATION AND CURETTAGE OF UTERUS      ELBOW SURGERY Left     x2  No hardware    ESOPHAGOGASTRODUODENOSCOPY      FOOT SURGERY Left     Plantar fasciotomy    HYSTERECTOMY      laporoscopic, partial    KNEE ARTHROSCOPY Bilateral     OOPHORECTOMY Left 10/2015    AZ ANAL SPHINCTEROTOMY N/A 8/31/2018    Procedure: EUA, LEFT PARTIAL INTERNAL SPHINCTEROTOMY;  Surgeon: De Vargas MD;  Location: [address] [place] [address] MAIN OR;  Service: Colorectal    AZ GASTROCNEMIUS RECESSION Left 2/24/2021    Procedure: RECESSION GASTROC OPEN;  Surgeon: Sheryle Numbers, DPM;  Location: [address] [place] [address];  Service: [facility] MIKV,0+I/E,BXXMQ N/A 4/24/2019    Procedure: REPAIR HERNIA UMBILICAL LAPAROSCOPIC W/ ROBOTICS;  Surgeon: Pavan Lindquist MD;  Location: [facility] Main OR;  Service: General    REDUCTION MAMMAPLASTY Bilateral 1999    REPAIR LACERATION Left     left hand-5/18/2009    ROTATOR CUFF REPAIR Left     TONSILECTOMY AND ADNOIDECTOMY      TONSILLECTOMY      US GUIDED MSK PROCEDURE  4/22/2021    VEIN LIGATION Bilateral     WISDOM TOOTH EXTRACTION         Medications: All current active medications have been reviewed  Medications prior to admission:    Prior to Admission Medications   Prescriptions Last Dose Informant Patient Reported? Taking?    COLESTIPOL HCL PO  Self Yes No   Sig: Take 1 g by mouth 2 (two) times a day   Cholecalciferol (Vitamin D3) 125 MCG (5000 UT) TABS Self Yes No   Sig: Take 5,000 Units by mouth daily   Lurasidone HCl (LATUDA PO)  Self Yes No   Sig: Take 60 mg by mouth daily at bedtime     Toviaz 8 MG TB24   No No   Sig: TAKE 1 TABLET (8 MG TOTAL) BY MOUTH DAILY   Patient taking differently: Take 4 mg by mouth daily     albuterol (PROVENTIL HFA,VENTOLIN HFA) 90 mcg/act inhaler  Self Yes No   Sig: Inhale 2 puffs every 6 (six) hours as needed for wheezing   buPROPion (WELLBUTRIN XL) 300 mg 24 hr tablet  Self Yes No   Sig: Take 300 mg by mouth daily   divalproex sodium (DEPAKOTE ER) 500 mg 24 hr tablet   No No   Sig: Take 1 tablet (500 mg total) by mouth daily   estradiol (ESTRACE) 1 mg tablet  Self No No   Sig: Take 1 tablet (1 mg total) by mouth daily   furosemide (LASIX) 20 mg tablet   No No   Sig: Take 1 tablet (20 mg total) by mouth daily   haloperidol decanoate (Haldol Decanoate) 50 mg/mL injection  Self Yes No   Sig: Inject 50 mg into a muscle every 30 (thirty) days   levothyroxine 75 mcg tablet  Self No No   Sig: Take 1 tablet (75 mcg total) by mouth daily   losartan (COZAAR) 50 mg tablet  Self Yes No   Sig: Take 50 mg by mouth daily   melatonin 3 mg   Yes No   Sig:     metoprolol tartrate (LOPRESSOR) 25 mg tablet   Yes No   Sig: Take 25 mg by mouth every 12 (twelve) hours     topiramate (TOPAMAX) 100 mg tablet  Self Yes No   Sig: Take 100 mg by mouth 2 (two) times a day   traZODone (DESYREL) 100 mg tablet  Self Yes No   Sig: Take 200 mg by mouth daily at bedtime       Facility-Administered Medications: None       Allergies: Allergies   Allergen Reactions    Percocet [Oxycodone-Acetaminophen] Headache     Severe headaches   (denies issues with Tylenol)    Betadine [Povidone Iodine] Rash     Unsure if betadine or gauze post surgical  Got rash on leg          Objective     Vital signs in last 24 hours:    Temp:  [96 2 °F (35 7 °C)-97 7 °F (36 5 °C)] 96 2 °F (35 7 °C)  HR:  [57-62] 62  Resp:  [16-18] 18  BP: ()/(49-59) 115/59    No intake or output data in the 24 hours ending 01/10/22 1412    Hospital Course:     Serena Jeronimo was admitted to the inpatient psychiatric unit and started on [facility] checks every 7 minutes  During the hospitalization she was encouraged to attend individual therapy, group therapy, milieu therapy and occupational therapy  Psychiatric medications were adjusted over the hospital stay  To address depressive symptoms, irritability, self-abusive behavior and anxiety symptoms, Serena Jeronimo was treated with antidepressant Wellbutrin XL and Trazodone, mood stabilizer Depakote ER, Topamax and Neurontin and antipsychotic medication Latuda, Haldol and Haldol Decanoate  Medication doses were adjusted during the hospital course  Wellbutrin XL was continued at 300mg daily  Trazodone was continued at 200mg qhs  Depakene was transitioned in favor of the Depakote ER formulation  Depakote ER was added and titrated to 750mg daily  On that dose of Depakote ER, the VPA level was clinically therapeutic at 51 on 1/9/2022  Neurontin was added and titrated to 300mg TID  Topamax was continued at 100mg BID  Dalton Holter was continued at 60mg qhs  Haldol was added at the dose of 5mg qhs  Haldol decanoate was continued at the 50mg IM q28 days dose  Prior to beginning of treatment medications risks and benefits and possible side effects including risk of liver impairment related to treatment with Depakote, risk of parkinsonian symptoms, Tardive Dyskinesia and metabolic syndrome related to treatment with antipsychotic medications and risk of suicidality and serotonin syndrome related to treatment with antidepressants were reviewed with Serena Jeronimo  She verbalized understanding and agreement for treatment  Upon admission Serena Jeronimo was seen by medical service for medical clearance for inpatient treatment and medical follow up  Blanca's symptoms slowly improved over the hospital course  Initially after admission she was still feeling depressed, anxious and overwhelmed   Serena Jeronimo once again signed a 72 hour notice voluntarily withdrawing herself from treatment, just as she did at her previous admission dated 12/21/21  Patient continues to demonstrate minimal interest in the inpatient therapeutic process  She was compliant with medication adjustments but remained focused on discharge  With adjustment of medications and therapeutic milieu her symptoms gradually improved  At the end of treatment Virgen Mccall somewhat improved  Her mood was improved at the time of discharge, and she was unwilling to consider rescinding her 72 hour notice for furtherance of treatment  Virgen Mccall denied suicidal ideation, intent or plan at the time of discharge and denied homicidal ideation, intent or plan at the time of discharge  There was no overt psychosis at the time of discharge  Virgen Mccall was participating appropriately in milieu at the time of discharge  Behavior was appropriate on the unit at the time of discharge  Sleep and appetite were improved  Virgenfrank Mccall was tolerating medications and was not reporting any significant side effects at the time of discharge  Virgenfrank Mccall signed 72 hour notice and requested discharge  At the time of the 72 hour notice expiration she had no criteria for involuntary commitment and denied any suicidal or homicidal ideation  The outpatient follow up with [facility] was arranged by the unit  upon discharge      Mental Status at Time of Discharge:     Appearance:  age appropriate, casually dressed, adequate grooming   Behavior:  cooperative, calm   Speech:  normal rate and volume   Mood:  normal   Affect:  appropriate   Thought Process:  organized, linear   Associations: intact associations   Thought Content:  no overt delusions   Perceptual Disturbances: no auditory hallucinations, no visual hallucinations, does not appear responding to internal stimuli   Risk Potential: Suicidal ideation - None at present  Homicidal ideation - None at present  Potential for aggression - No Sensorium:  oriented to person, place, time/date and situation   Memory:  recent and remote memory grossly intact   Consciousness:  alert and awake   Attention/Concentration: attention span and concentration appear shorter than expected for age   Insight:  limited   Judgment: limited   Gait/Station: normal gait/station   Motor Activity: no abnormal movements       Admission Diagnosis:    Principal Problem:    Schizoaffective disorder, bipolar type ([place] [address] )  Active Problems:    Benign essential hypertension    Chronic low back pain    Edema    Hypothyroidism    MAG (obstructive sleep apnea)    Overactive bladder    Primary osteoarthritis of both knees    Marijuana abuse    COPD (chronic obstructive pulmonary disease) ([place] [address] )    Class 3 severe obesity due to excess calories with serious comorbidity and body mass index (BMI) of 40 0 to 44 9 in [facility])    Substance abuse ([place] [address] )    GERD (gastroesophageal reflux disease)    Diarrhea    Ecchymosis    Anxiety    Borderline personality disorder ([place] [address] )      Discharge Diagnosis:     Principal Problem:    Schizoaffective disorder, bipolar type ([place] [address] )  Active Problems:    Benign essential hypertension    Chronic low back pain    Edema    Hypothyroidism    MAG (obstructive sleep apnea)    Overactive bladder    Primary osteoarthritis of both knees    Marijuana abuse    COPD (chronic obstructive pulmonary disease) ([facility])    Class 3 severe obesity due to excess calories with serious comorbidity and body mass index (BMI) of 40 0 to 44 9 in [facility])    Substance abuse ([facility])    GERD (gastroesophageal reflux disease)    Diarrhea    Ecchymosis    Anxiety    Borderline personality disorder ([place] [address] )  Resolved Problems:    Medical clearance for psychiatric admission      Lab Results:   I have personally reviewed all pertinent laboratory/tests results    Most Recent Labs:   Lab Results   Component Value Date    WBC 9 95 01/06/2022    RBC 4 90 01/06/2022    HGB 14 3 01/06/2022    HCT 45 7 01/06/2022     (L) 01/06/2022    RDW 13 1 01/06/2022    NEUTROABS 5 80 01/06/2022    SODIUM 138 01/06/2022    K 3 9 01/06/2022     01/06/2022    CO2 24 01/06/2022    BUN 12 01/06/2022    CREATININE 0 66 01/06/2022    GLUC 90 01/06/2022    GLUF 90 01/06/2022    CALCIUM 8 7 01/06/2022    AST 19 01/06/2022    ALT 26 01/06/2022    ALKPHOS 72 01/06/2022    TP 7 1 01/06/2022    ALB 3 2 (L) 01/06/2022    TBILI 0 30 01/06/2022    CHOLESTEROL 276 (H) 01/06/2022    HDL 60 01/06/2022    TRIG 139 01/06/2022    LDLCALC 188 (H) 01/06/2022    [place] 216 01/06/2022    VALPROICTOT 51 01/09/2022    RCD7UKVNIGVL 7 330 (H) 01/06/2022    FREET4 1 00 01/06/2022    T3FREE 2 27 (L) 10/16/2019    PREGUR negative 08/28/2021    PREGSERUM Negative 01/06/2022    RPR Non-Reactive 01/06/2022    HGBA1C 4 9 12/18/2020    EAG 94 12/18/2020       Discharge Medications:    See after visit summary for all reconciled discharge medications provided to patient and family  Discharge instructions/Information to patient and family:     See after visit summary for information provided to patient and family  Provisions for Follow-Up Care:    See after visit summary for information related to follow-up care and any pertinent home health orders  Discharge Statement:    I spent 38 minutes discharging the patient  This time was spent on the day of discharge  I had direct contact with the patient on the day of discharge  Additional documentation is required if more than 30 minutes were spent on discharge:    I reviewed with Marylen Estelle importance of compliance with medications and outpatient treatment after discharge  I discussed the medication regimen and possible side effects of the medications with Marylen Estelle prior to discharge  At the time of discharge she was tolerating psychiatric medications  I discussed outpatient follow up with Marylen Estelle  I reviewed with Marylen Estelle crisis plan and safety plan upon discharge    Marylen Estelle agreed to abstain from drug and alcohol use after discharge  Adenike Carter signed 72 hour notice and requested discharge  At the time of the 72 hour notice expiration she had no criteria for involuntary commitment and denied any suicidal or homicidal ideation  Discharge on Two Antipsychotic Medications: Yes - Adenike Carter is being discharged on 2 antipsychotic agents (Latuda and Haldol and Haldol Decanoate) due to the history of at least 3 antipsychotic medication trials and failure of multiple trials of monotherapy      Paul Roach PA-C 01/10/22

## 2022-01-10 NOTE — BH TRANSITION RECORD
Contact Information: If you have any questions, concerns, pended studies, tests and/or procedures, or emergencies regarding your inpatient behavioral health visit  Please contact [place] behavioral health unit [phone] and ask to speak to a , nurse or physician  A contact is available 24 hours/ 7 days a week at this number  Summary of Procedures Performed During your Stay:  Below is a list of major procedures performed during your hospital stay and a summary of results:  - Cardiac Procedures/Studies: EKG  Pending Studies (From admission, onward)    None        If studies are pending at discharge, follow up with your PCP and/or referring provider

## 2022-01-11 NOTE — CASE MANAGEMENT
CM joined [facility] & present was Maria Guadalupe, from [employer]  She reported that Bernadine Beltran was tied up in rounds still & would join shortly  CM inquired if Maria Guadalupe would like to speak with Pt in the mean time, but she declined, stating Bernadine Beltran was the primary  At 9:20 AM, CM asked if Bernadine Beltran was ready yet, as Pt was told the meeting was at 9 AM, & reported one of the reasons for her hospitalization, was that she said that ACT does not follow-up or through when they say they are going to  Beck Almanza who then joined the meeting & CM got Pt from her room  Pt initially looking at her hands, soft spoken, & given minimal responses to questions  Pt did brighten throughout the conversation  Bernadine Beltran reviewed that she would have weekly contact for the first 7 days after discharge  Three of these contacts were to be face-to-face, however, if staff was exposed to [place], they may have to do Zoom or phone; Pt verbalized understanding  Pt had no questions for the team   Bernadine Beltran requested Pt's AVS be emailed to her as well as faxed to the office; CM agreed  CM & Pt talked after the meeting & Pt plans to return to work tomorrow; note provided in d/c folder  Pt had no questions for CM about her d/c plans   Jose Guadalupe batista signed & transportation scheduled for 10:30 AM

## 2022-01-11 NOTE — PROGRESS NOTES
Status: Pt comfortable on the unit, stating she is bored at times  Pt attended group & slept overnight with out issues & states she is ready for d/c  Medication: no changes / PRN - Colace & Motrin  D/C: Today / Pt unsure about transportation  TEAMS meeting at 9 AM with [place] Picket      01/10/22 0750   Team Meeting   Meeting Type Daily Rounds   Team Members Present   Team Members Present Physician;Nurse; Other (Discipline and Name);    Physician Team Member Dr Erin Cunningham / Bishnu Gates / Judah Bae Team Member Emilia Cardenas / Sandee Mcmillan Management Team Member Tremaine Vasquez / Hamilton Mckeon   Other (Discipline and Name) Caroline Leiva (art therapy)   Patient/Family Present   Patient Present No   Patient's Family Present No

## 2022-01-18 ENCOUNTER — OFFICE VISIT (OUTPATIENT)
Dept: OBGYN CLINIC | Facility: CLINIC | Age: 52
End: 2022-01-18
Payer: MEDICARE

## 2022-01-18 VITALS
DIASTOLIC BLOOD PRESSURE: 82 MMHG | HEIGHT: 66 IN | SYSTOLIC BLOOD PRESSURE: 128 MMHG | WEIGHT: 272 LBS | BODY MASS INDEX: 43.71 KG/M2

## 2022-01-18 DIAGNOSIS — N95.1 VASOMOTOR SYMPTOMS DUE TO MENOPAUSE: Primary | ICD-10-CM

## 2022-01-18 DIAGNOSIS — N94.9 GYNECOLOGIC DISORDER: ICD-10-CM

## 2022-01-18 PROCEDURE — 99214 OFFICE O/P EST MOD 30 MIN: CPT | Performed by: OBSTETRICS & GYNECOLOGY

## 2022-01-18 RX ORDER — ESTRADIOL 1 MG/1
1 TABLET ORAL DAILY
Qty: 30 TABLET | Refills: 3 | Status: SHIPPED | OUTPATIENT
Start: 2022-01-18 | End: 2022-04-20 | Stop reason: DRUGHIGH

## 2022-01-18 RX ORDER — RABEPRAZOLE SODIUM 20 MG/1
20 TABLET, DELAYED RELEASE ORAL DAILY
COMMUNITY
End: 2022-03-03 | Stop reason: DRUGHIGH

## 2022-01-18 RX ORDER — BUPROPION HYDROCHLORIDE 150 MG/1
TABLET ORAL
COMMUNITY
Start: 2022-01-17 | End: 2022-01-18

## 2022-01-18 RX ORDER — FLUTICASONE PROPIONATE 110 UG/1
2 AEROSOL, METERED RESPIRATORY (INHALATION) 2 TIMES DAILY
COMMUNITY

## 2022-01-18 RX ORDER — AMLODIPINE BESYLATE 5 MG/1
5 TABLET ORAL DAILY
COMMUNITY
End: 2022-01-19 | Stop reason: SDUPTHER

## 2022-01-18 RX ORDER — PILOCARPINE HYDROCHLORIDE 5 MG/1
5 TABLET, FILM COATED ORAL 2 TIMES DAILY
COMMUNITY

## 2022-01-18 NOTE — PROGRESS NOTES
Assessment/Plan:  Discussed the risks and benefits of estrogen replacement therapy  At this point she has no contraindications  Her quality of life is affected without HRT  She will continue Estrace orally  Discussed decreasing down to 0 5 mg, cutting current dose over the next couple of weeks  Discussed if vasomotor symptoms reoccur then can go back to 1 mg daily  All questions answered  Return to office in June for annual gyn visit or p r n  No problem-specific Assessment & Plan notes found for this encounter  Diagnoses and all orders for this visit:    Vasomotor symptoms due to menopause    Gynecologic disorder  -     estradiol (ESTRACE) 1 mg tablet; Take 1 tablet (1 mg total) by mouth daily    Other orders  -     Discontinue: buPROPion (WELLBUTRIN XL) 150 mg 24 hr tablet  -     RABEprazole (Aciphex) 20 MG tablet; Take 20 mg by mouth daily  -     amLODIPine (NORVASC) 5 mg tablet; Take 5 mg by mouth daily  -     pilocarpine (SALAGEN) 5 mg tablet; Take 5 mg by mouth 3 (three) times a day  -     fluticasone (FLOVENT HFA) 110 MCG/ACT inhaler; Inhale 2 puffs 2 (two) times a day Rinse mouth after use  Subjective:      Patient ID: Cira Anderson is a 49 y o  female  HPI     This is a pleasant 81-year-old female P0 who presents as a new patient to me requesting refill on estrogen replacement treatment  She has been on Estrace 1 mg orally for the last 5 years  Her gyn history significant for supracervical hysterectomy, unilateral salpingo-oophorectomy in her early 45s due to irregular bleeding  She denies any vaginal bleeding or spotting  She has done well with her estrogen  She has had hot flashes when she misses her doses  Patient is a nonsmoker  No history of DVT/breast cancer  Blood pressures are normal     Medications have been adjusted through her psychiatrist in the last couple of months  She is scheduled for right knee replacement next month      The following portions of the patient's history were reviewed and updated as appropriate: allergies, current medications, past family history, past medical history, past social history, past surgical history and problem list     Review of Systems   Constitutional: Negative for fatigue, fever and unexpected weight change  Respiratory: Negative for cough, chest tightness, shortness of breath and wheezing  Cardiovascular: Negative  Negative for chest pain and palpitations  Gastrointestinal: Negative  Negative for abdominal distention, abdominal pain, blood in stool, constipation, diarrhea, nausea and vomiting  Genitourinary: Negative  Negative for difficulty urinating, dyspareunia, dysuria, flank pain, frequency, genital sores, hematuria, pelvic pain, urgency, vaginal bleeding, vaginal discharge and vaginal pain  Skin: Negative for rash  Objective:      /82   Ht 5' 6" (1 676 m)   Wt 123 kg (272 lb)   BMI 43 90 kg/m²          Physical Exam  Constitutional:       Appearance: Normal appearance  Cardiovascular:      Rate and Rhythm: Normal rate and regular rhythm  Pulmonary:      Effort: Pulmonary effort is normal       Breath sounds: Normal breath sounds  Neurological:      Mental Status: She is alert and oriented to person, place, and time     Psychiatric:         Behavior: Behavior normal

## 2022-01-19 ENCOUNTER — OFFICE VISIT (OUTPATIENT)
Dept: FAMILY MEDICINE CLINIC | Facility: CLINIC | Age: 52
End: 2022-01-19
Payer: MEDICARE

## 2022-01-19 VITALS
HEIGHT: 66 IN | DIASTOLIC BLOOD PRESSURE: 76 MMHG | BODY MASS INDEX: 43.78 KG/M2 | SYSTOLIC BLOOD PRESSURE: 120 MMHG | TEMPERATURE: 97.7 F | WEIGHT: 272.4 LBS | RESPIRATION RATE: 16 BRPM | OXYGEN SATURATION: 98 % | HEART RATE: 71 BPM

## 2022-01-19 DIAGNOSIS — D69.6 PLATELETS DECREASED (HCC): ICD-10-CM

## 2022-01-19 DIAGNOSIS — Z00.00 MEDICARE ANNUAL WELLNESS VISIT, SUBSEQUENT: Primary | ICD-10-CM

## 2022-01-19 DIAGNOSIS — J44.9 CHRONIC OBSTRUCTIVE PULMONARY DISEASE, UNSPECIFIED COPD TYPE (HCC): ICD-10-CM

## 2022-01-19 DIAGNOSIS — I10 HYPERTENSION, UNSPECIFIED TYPE: ICD-10-CM

## 2022-01-19 DIAGNOSIS — M35.00 SJOGREN'S SYNDROME, WITH UNSPECIFIED ORGAN INVOLVEMENT (HCC): ICD-10-CM

## 2022-01-19 DIAGNOSIS — R41.89 COGNITIVE CHANGE: ICD-10-CM

## 2022-01-19 PROCEDURE — 99214 OFFICE O/P EST MOD 30 MIN: CPT | Performed by: NURSE PRACTITIONER

## 2022-01-19 PROCEDURE — G0439 PPPS, SUBSEQ VISIT: HCPCS | Performed by: NURSE PRACTITIONER

## 2022-01-19 RX ORDER — FLUTICASONE PROPIONATE 44 MCG
2 AEROSOL WITH ADAPTER (GRAM) INHALATION 2 TIMES DAILY
Qty: 10.6 G | Refills: 3 | Status: SHIPPED | OUTPATIENT
Start: 2022-01-19 | End: 2022-02-10 | Stop reason: SDUPTHER

## 2022-01-19 RX ORDER — AMLODIPINE BESYLATE 5 MG/1
5 TABLET ORAL DAILY
Qty: 30 TABLET | Refills: 3 | Status: SHIPPED | OUTPATIENT
Start: 2022-01-19 | End: 2022-05-18

## 2022-01-19 NOTE — PROGRESS NOTES
Assessment and Plan:     Problem List Items Addressed This Visit        Respiratory    COPD (chronic obstructive pulmonary disease) (HCC) (Chronic)    Relevant Medications    fluticasone (Flovent HFA) 44 mcg/act inhaler       Other    Sjogren's syndrome ([facility] [address] )    Platelets decreased ([facility] [address] )      Other Visit Diagnoses     Medicare annual wellness visit, subsequent    -  Primary    Hypertension, unspecified type        Relevant Medications    amLODIPine (NORVASC) 5 mg tablet    Cognitive change        Relevant Orders    Ambulatory Referral to Neurology           Preventive health issues were discussed with patient, and age appropriate screening tests were ordered as noted in patient's After Visit Summary  Personalized health advice and appropriate referrals for health education or preventive services given if needed, as noted in patient's After Visit Summary       History of Present Illness:     Patient presents for Medicare Annual Wellness visit    Patient Care Team:  Arik Lopez as PCP - General (Nurse Practitioner)  Francisco Perez MD as PCP - [address] [place] [address] (RTE)     Problem List:     Patient Active Problem List   Diagnosis    Yan's esophagus determined by biopsy    Benign essential hypertension    Schizoaffective disorder, bipolar type ([facility] [address] )    Chronic low back pain    DDD (degenerative disc disease), lumbar    Edema    Family history of renal cancer    Hypothyroidism    Microhematuria    MAG (obstructive sleep apnea)    Spondylosis of lumbosacral joint without myelopathy    Overactive bladder    Primary osteoarthritis of both knees    Major depressive disorder    Sjogren's syndrome ([address] )    Marijuana abuse    COPD (chronic obstructive pulmonary disease) ([facility] [address] )    Mixed hyperlipidemia    Class 3 severe obesity due to excess calories with serious comorbidity and body mass index (BMI) of 40 0 to 44 9 in adult [facility])    Memory difficulties    History of COVID-19    Morbid obesity with BMI of 40 0-44 9, adult ([facility])    Hemorrhage of rectum and anus    Medial epicondylitis of left elbow    Restrictive lung disease    Chronic tension-type headache, intractable    Potential for cognitive impairment    Mood disorder ([facility])    Substance abuse ([facility] [address] )    Cognitive impairment    Contusion of elbow    GERD (gastroesophageal reflux disease)    Diarrhea    Ecchymosis    Anxiety    Borderline personality disorder ([address] )    Platelets decreased ([facility])      Past Medical and Surgical History:     Past Medical History:   Diagnosis Date    Anxiety     Arthritis     oa cassandra knees    Asthma     good control- no medications    Yan's esophagus     Bipolar affective disorder ([facility])     Chronic pain disorder     lumbar    CPAP (continuous positive airway pressure) dependence     Degenerative disc disease at L5-S1 level     Deliberate self-cutting     Depression 09/16/2008    Disease of thyroid gland     hypo    MARTINEZ (dyspnea on exertion)     Drug overdose 10/28/2008    suicide attempt    Dysphagia     Dyspnea     Edema     BLE    GERD (gastroesophageal reflux disease)     High blood pressure     History of COVID-19 12/30/2020    Symptoms started on 12/30/2020 and then got worse  Today she is feeling a little bit better    She is a candidate for monoclonal antibody infusion and set for 1/6/21 @ 1pm at [facility]  01/07/21 - telemedicine -     Hypertension     Knee pain, bilateral     Especially right    Migraines     Obese     Overactive bladder     Sjogren's disease ([facility] [address] )     Sleep apnea     Stress incontinence     Suicidal ideations     Umbilical hernia     surgical repair today 4/24/2019    Use of cane as ambulatory aid     awaiting b/l knee replacement    Wears glasses      Past Surgical History:   Procedure Laterality Date    BREAST BIOPSY Right 1989    benign    CARPAL TUNNEL RELEASE Left     CHOLECYSTECTOMY  05/2003    Laparoscopic    COLONOSCOPY 2009    DILATION AND CURETTAGE OF UTERUS      ELBOW SURGERY Left     x2  No hardware    ESOPHAGOGASTRODUODENOSCOPY      FOOT SURGERY Left     Plantar fasciotomy    HYSTERECTOMY      laporoscopic, partial    KNEE ARTHROSCOPY Bilateral     OOPHORECTOMY Left 10/2015    AR ANAL SPHINCTEROTOMY N/A 2018    Procedure: EUA, LEFT PARTIAL INTERNAL SPHINCTEROTOMY;  Surgeon: Filipe Geronimo MD;  Location: [address] [place] [address];  Service: Colorectal    AR GASTROCNEMIUS RECESSION Left 2021    Procedure: RECESSION GASTROC OPEN;  Surgeon: Alycia Castillo DPM;  Location: [address] [place] [address] OR;  Service: Podiatry    AR REPAIR UMBILICAL NEGH,3+Q/G,NMBLF N/A 2019    Procedure: REPAIR HERNIA UMBILICAL LAPAROSCOPIC W/ ROBOTICS;  Surgeon: Romero Vargas MD;  Location: [facility] OR;  Service: General    REDUCTION MAMMAPLASTY Bilateral 1999    REPAIR LACERATION Left     left hand-2009    ROTATOR CUFF REPAIR Left     TONSILECTOMY AND ADNOIDECTOMY      TONSILLECTOMY      US GUIDED MSK PROCEDURE  2021    VEIN LIGATION Bilateral     WISDOM TOOTH EXTRACTION        Family History:     Family History   Problem Relation Age of Onset    Kidney cancer Mother     Diabetes Mother     Colon cancer Family     No Known Problems Father     No Known Problems Sister     Colon cancer Paternal Uncle     Colon cancer Maternal Uncle     Colon cancer Maternal Uncle       Social History:     Social History     Socioeconomic History    Marital status: Single     Spouse name: None    Number of children: None    Years of education: None    Highest education level: None   Occupational History    None   Tobacco Use    Smoking status: Former Smoker     Packs/day: 3 00     Types: Cigarettes     Quit date: 2018     Years since quittin 0    Smokeless tobacco: Never Used    Tobacco comment: Started at age 14     Vaping Use    Vaping Use: Some days    Substances: THC   Substance and Sexual Activity    Alcohol use: Not Currently     Comment: Recovering alcoholic    Drug use: Yes     Types: Marijuana, Methamphetamines     Comment: medical- vapes 3 times per wk at hs    Sexual activity: Not Currently   Other Topics Concern    None   Social History Narrative    None     Social Determinants of Health     Financial Resource Strain: Not on file   Food Insecurity: Not on file   Transportation Needs: Not on file   Physical Activity: Not on file   Stress: Not on file   Social Connections: Not on file   Intimate Partner Violence: Not on file   Housing Stability: Not on file      Medications and Allergies:     Current Outpatient Medications   Medication Sig Dispense Refill    albuterol (PROVENTIL HFA,VENTOLIN HFA) 90 mcg/act inhaler Inhale 2 puffs every 6 (six) hours as needed for wheezing 8 g 0    amLODIPine (NORVASC) 5 mg tablet Take 1 tablet (5 mg total) by mouth daily 30 tablet 3    buPROPion (WELLBUTRIN XL) 300 mg 24 hr tablet Take 1 tablet (300 mg total) by mouth daily 30 tablet 0    Cholecalciferol (Vitamin D3) 125 MCG (5000 UT) TABS Take 5,000 Units by mouth daily      colestipol (COLESTID) 1 g tablet Take 1 tablet (1 g total) by mouth 2 (two) times a day 60 tablet 0    divalproex sodium (DEPAKOTE ER) 250 mg 24 hr tablet Take 3 tablets (750 mg total) by mouth daily 90 tablet 0    estradiol (ESTRACE) 1 mg tablet Take 1 tablet (1 mg total) by mouth daily 30 tablet 3    fluticasone (FLOVENT HFA) 110 MCG/ACT inhaler Inhale 2 puffs 2 (two) times a day Rinse mouth after use        furosemide (LASIX) 20 mg tablet Take 1 tablet (20 mg total) by mouth daily 30 tablet 0    gabapentin (NEURONTIN) 100 mg capsule Take 1 capsule (100 mg total) by mouth 3 (three) times a day 60 capsule 0    haloperidol (HALDOL) 5 mg tablet Take 1 tablet (5 mg total) by mouth daily at bedtime 30 tablet 0    haloperidol decanoate (Haldol Decanoate) 50 mg/mL injection Inject 50 mg into a muscle every 30 (thirty) days      levothyroxine 75 mcg tablet Take 1 tablet (75 mcg total) by mouth daily in the early morning 30 tablet 0    losartan (COZAAR) 50 mg tablet Take 1 tablet (50 mg total) by mouth daily 30 tablet 0    lurasidone 60 MG TABS Take 1 tablet (60 mg total) by mouth daily with dinner 30 tablet 0    melatonin 3 mg Take 1 tablet (3 mg total) by mouth daily at bedtime 30 tablet 0    metoprolol tartrate (LOPRESSOR) 25 mg tablet Take 1 tablet (25 mg total) by mouth every 12 (twelve) hours 60 tablet 0    pilocarpine (SALAGEN) 5 mg tablet Take 5 mg by mouth 3 (three) times a day      RABEprazole (Aciphex) 20 MG tablet Take 20 mg by mouth daily      topiramate (TOPAMAX) 100 mg tablet Take 1 tablet (100 mg total) by mouth 2 (two) times a day 60 tablet 0    Toviaz 8 MG TB24 TAKE 1 TABLET (8 MG TOTAL) BY MOUTH DAILY (Patient taking differently: Take 4 mg by mouth daily  ) 30 tablet 3    fluticasone (Flovent HFA) 44 mcg/act inhaler Inhale 2 puffs 2 (two) times a day Rinse mouth after use  10 6 g 3    pantoprazole (PROTONIX) 20 mg tablet Take 1 tablet (20 mg total) by mouth daily in the early morning (Patient not taking: Reported on 1/18/2022 ) 30 tablet 0     No current facility-administered medications for this visit  Allergies   Allergen Reactions    Percocet [Oxycodone-Acetaminophen] Headache     Severe headaches   (denies issues with Tylenol)    Betadine [Povidone Iodine] Rash     Unsure if betadine or gauze post surgical  Got rash on leg         Immunizations:     Immunization History   Administered Date(s) Administered    COVID-19 PFIZER VACCINE 0 3 ML IM 03/31/2021, 04/21/2021, 12/13/2021    INFLUENZA 08/07/2014, 07/29/2015, 10/10/2018    Influenza Split 07/29/2015    Influenza, recombinant, quadrivalent,injectable, preservative free 11/27/2018, 09/20/2021    Tdap 01/26/2009, 07/23/2018    Tuberculin Skin Test-PPD Intradermal 12/10/2018      Health Maintenance:         Topic Date Due    Cervical Cancer Screening  06/10/2022    Colorectal Cancer Screening  09/03/2030    HIV Screening  Completed    Hepatitis C Screening  Completed     There are no preventive care reminders to display for this patient  Medicare Health Risk Assessment:     /76 (BP Location: Left arm, Patient Position: Sitting, Cuff Size: Large)   Pulse 71   Temp 97 7 °F (36 5 °C) (Tympanic)   Resp 16   Ht 5' 6" (1 676 m)   Wt 124 kg (272 lb 6 4 oz)   SpO2 98%   BMI 43 97 kg/m²      Ashli Velasco is here for her Subsequent Wellness visit  Health Risk Assessment:   Patient feels that their physical health rating is same  Patient is very dissatisfied with their life  Eyesight was rated as slightly worse  Hearing was rated as same  Patient feels that their emotional and mental health rating is slightly worse  Patients states they are always angry  Patient states they are often unusually tired/fatigued  Pain experienced in the last 7 days has been a lot  Patient's pain rating has been 7/10  Patient states that she has experienced no weight loss or gain in last 6 months  Fall Risk Screening: In the past year, patient has experienced: no history of falling in past year      Urinary Incontinence Screening:   Patient has leaked urine accidently in the last six months  Once in while     Home Safety:  Patient has trouble with stairs inside or outside of their home  Patient has working smoke alarms and has working carbon monoxide detector  Home safety hazards include: none  Nutrition:   Current diet is Regular  Medications:   Patient is currently taking over-the-counter supplements  OTC medications include: see medication list  Patient is able to manage medications  Activities of Daily Living (ADLs)/Instrumental Activities of Daily Living (IADLs):   Walk and transfer into and out of bed and chair?: Yes  Dress and groom yourself?: Yes    Bathe or shower yourself?: Yes    Feed yourself?  Yes  Do your laundry/housekeeping?: Yes  Manage your money, pay your bills and track your expenses?: Yes  Make your own meals?: Yes    Do your own shopping?: Yes    Previous Hospitalizations:   Any hospitalizations or ED visits within the last 12 months?: Yes    How many hospitalizations have you had in the last year?: 1-2    Advance Care Planning:   Living will: No      PREVENTIVE SCREENINGS      Cardiovascular Screening:    General: Screening Not Indicated and History Lipid Disorder      Diabetes Screening:     General: Screening Current      Colorectal Cancer Screening:     General: Screening Current      Breast Cancer Screening:     General: Screening Current      Cervical Cancer Screening:    General: Screening Current      Lung Cancer Screening:     General: Screening Not Indicated      Hepatitis C Screening:    General: Screening Current    Screening, Brief Intervention, and Referral to Treatment (SBIRT)    Screening      Single Item Drug Screening:  How often have you used an illegal drug (including marijuana) or a prescription medication for non-medical reasons in the past year? never    Single Item Drug Screen Score: 0  Interpretation: Negative screen for possible drug use disorder    Other Counseling Topics:   Car/seat belt/driving safety, skin self-exam, sunscreen and calcium and vitamin D intake and regular weightbearing exercise  Assessment/Plan:         Diagnoses and all orders for this visit:    Medicare annual wellness visit, subsequent    Hypertension, unspecified type  -     amLODIPine (NORVASC) 5 mg tablet; Take 1 tablet (5 mg total) by mouth daily    Platelets decreased (HCC)    Sjogren's syndrome, with unspecified organ involvement (HCC)    Chronic obstructive pulmonary disease, unspecified COPD type ([facility] [address] )  -     fluticasone (Flovent HFA) 44 mcg/act inhaler; Inhale 2 puffs 2 (two) times a day Rinse mouth after use  Cognitive change  -     Ambulatory Referral to Neurology; Future          Subjective:      Patient ID: Morena Kumar is a 49 y o  female  HPI  Here for medicare wellness and physical for job      Having knee surgery 2/23   Replacement    Recent hospital trip for behavior health      The following portions of the patient's history were reviewed and updated as appropriate: allergies, current medications, past family history, past medical history, past social history, past surgical history and problem list     Review of Systems   Constitutional: Negative for chills and fever  HENT: Negative for ear pain, postnasal drip and sore throat  Eyes: Negative for pain and visual disturbance  Respiratory: Negative for cough and shortness of breath  Cardiovascular: Negative for chest pain and palpitations  Gastrointestinal: Negative for abdominal pain and vomiting  Genitourinary: Negative for dysuria, frequency and hematuria  Musculoskeletal: Positive for back pain, gait problem and joint swelling  Negative for arthralgias  Skin: Negative for color change and rash  Neurological: Positive for headaches  Negative for seizures and syncope  Psychiatric/Behavioral: Positive for behavioral problems  The patient is nervous/anxious  Recent hospital   Doing well now     Altering meds due to sedation    All other systems reviewed and are negative  Objective:  Vitals:    01/19/22 0708   BP: 120/76   BP Location: Left arm   Patient Position: Sitting   Cuff Size: Large   Pulse: 71   Resp: 16   Temp: 97 7 °F (36 5 °C)   TempSrc: Tympanic   SpO2: 98%   Weight: 124 kg (272 lb 6 4 oz)   Height: 5' 6" (1 676 m)      Physical Exam  Vitals reviewed  Constitutional:       Appearance: She is well-developed  She is obese  HENT:      Head: Normocephalic  Right Ear: Tympanic membrane, ear canal and external ear normal       Left Ear: Tympanic membrane, ear canal and external ear normal    Eyes:      General:         Right eye: No discharge  Left eye: No discharge        Conjunctiva/sclera: Conjunctivae normal       Pupils: Pupils are equal, round, and reactive to light  Neck:      Thyroid: No thyromegaly  Cardiovascular:      Rate and Rhythm: Normal rate and regular rhythm  Heart sounds: Normal heart sounds  No murmur heard  Pulmonary:      Effort: Pulmonary effort is normal       Breath sounds: Normal breath sounds  Abdominal:      General: Bowel sounds are normal       Palpations: Abdomen is soft  Tenderness: There is no abdominal tenderness  Musculoskeletal:         General: Normal range of motion  Cervical back: Normal range of motion and neck supple  Legs:    Lymphadenopathy:      Cervical: No cervical adenopathy  Skin:     General: Skin is warm  Findings: No rash  Neurological:      Mental Status: She is alert and oriented to person, place, and time  Psychiatric:         Attention and Perception: Attention and perception normal          Mood and Affect: Mood and affect normal          Speech: Speech normal          Behavior: Behavior normal  Behavior is cooperative  Thought Content:  Thought content normal          Cognition and Memory: Cognition normal          Judgment: Judgment normal       Comments: Doing well today          Yemi Kent

## 2022-01-26 ENCOUNTER — CLINICAL SUPPORT (OUTPATIENT)
Dept: FAMILY MEDICINE CLINIC | Facility: CLINIC | Age: 52
End: 2022-01-26
Payer: MEDICARE

## 2022-01-26 ENCOUNTER — TELEPHONE (OUTPATIENT)
Dept: NEUROLOGY | Facility: CLINIC | Age: 52
End: 2022-01-26

## 2022-01-26 DIAGNOSIS — Z11.1 SCREENING-PULMONARY TB: Primary | ICD-10-CM

## 2022-01-26 PROCEDURE — 86580 TB INTRADERMAL TEST: CPT

## 2022-01-28 ENCOUNTER — TELEPHONE (OUTPATIENT)
Dept: NEUROLOGY | Facility: CLINIC | Age: 52
End: 2022-01-28

## 2022-01-28 ENCOUNTER — CLINICAL SUPPORT (OUTPATIENT)
Dept: FAMILY MEDICINE CLINIC | Facility: CLINIC | Age: 52
End: 2022-01-28

## 2022-01-28 DIAGNOSIS — Z11.1 SCREENING FOR TUBERCULOSIS: Primary | ICD-10-CM

## 2022-01-28 LAB
INDURATION: 0 MM
TB SKIN TEST: NEGATIVE

## 2022-01-28 NOTE — TELEPHONE ENCOUNTER
Called patient and she stated that she is unaware of how long she will be in the hospital or or long the recovery will be, but does have an appointment on 02/01/22 to find all of this information out  I let her know that we can keep the appointment as is and will reschedule if we need to  I will call the patient back after her 02/01/22 appointment if she does not reach back out to us with that information  Patient is okay with that

## 2022-01-28 NOTE — TELEPHONE ENCOUNTER
----- Message from [facility] sent at 1/27/2022 10:18 AM EST -----  Regarding: Botox  Needs to RS Botox bec having Surgery    [phone]    Please f/u Private Vehicle

## 2022-01-28 NOTE — LETTER
January 28, 2022     Patient: Xu Casillas   YOB: 1970   Date of Visit: 1/28/2022       To Whom It May Concern:    Mary Ellen Rosenthal was seen in my clinic on 1/28/2022 at 8:30 am  Please excuse Marylen Estelle  For being late for  work on this day to make the appointment  If you have any questions or concerns, please don't hesitate to call           Sincerely,         Scott Prasad

## 2022-01-31 ENCOUNTER — OFFICE VISIT (OUTPATIENT)
Dept: OBGYN CLINIC | Facility: HOSPITAL | Age: 52
End: 2022-01-31
Payer: MEDICARE

## 2022-01-31 ENCOUNTER — HOSPITAL ENCOUNTER (OUTPATIENT)
Dept: RADIOLOGY | Facility: HOSPITAL | Age: 52
Discharge: HOME | End: 2022-01-31
Attending: SURGERY
Payer: MEDICARE

## 2022-01-31 ENCOUNTER — OFFICE VISIT (OUTPATIENT)
Dept: OCCUPATIONAL THERAPY | Facility: HOSPITAL | Age: 52
End: 2022-01-31
Payer: MEDICARE

## 2022-01-31 VITALS
HEIGHT: 66 IN | DIASTOLIC BLOOD PRESSURE: 81 MMHG | RESPIRATION RATE: 17 BRPM | BODY MASS INDEX: 44.03 KG/M2 | HEART RATE: 83 BPM | SYSTOLIC BLOOD PRESSURE: 120 MMHG | WEIGHT: 274 LBS

## 2022-01-31 DIAGNOSIS — M77.11 LATERAL EPICONDYLITIS OF RIGHT ELBOW: Primary | ICD-10-CM

## 2022-01-31 DIAGNOSIS — M77.11 LATERAL EPICONDYLITIS OF RIGHT ELBOW: ICD-10-CM

## 2022-01-31 DIAGNOSIS — M25.521 PAIN IN RIGHT ELBOW: ICD-10-CM

## 2022-01-31 PROCEDURE — 73080 X-RAY EXAM OF ELBOW: CPT

## 2022-01-31 PROCEDURE — 99214 OFFICE O/P EST MOD 30 MIN: CPT | Performed by: SURGERY

## 2022-01-31 PROCEDURE — 97760 ORTHOTIC MGMT&TRAING 1ST ENC: CPT

## 2022-01-31 RX ORDER — VENLAFAXINE HYDROCHLORIDE 75 MG/1
1 CAPSULE, EXTENDED RELEASE ORAL DAILY
Status: ON HOLD | COMMUNITY
Start: 2022-01-26 | End: 2022-07-14 | Stop reason: CLARIF

## 2022-01-31 NOTE — PROGRESS NOTES
Orthosis    Diagnosis:   1  Lateral epicondylitis of right elbow  Ambulatory Referral to PT/OT Hand Therapy     Indication: wrist position    Location: Right  wrist and hand  Supplies: Custom Fit Orthotic  Orthosis type: Volar Hand-Wrist  Wearing Schedule: Night only  Describe Position: wrist cock up, volar forearm based with wrist extended 30*    Precautions: Universal (skin contact/breakdown)    Patient or Caregiver expresses understanding of wearing Schedule and Precautions? Yes  Patient or Caregiver able to don/doff orthotic independently? Yes    Written orders provided to patient? Yes  Orders Obtained: Written  Orders Obtained from: [facility]    Return for evaluation and treatment No     Patient verbalized and demonstrated understanding of education provided on splint wear and care, wearing schedule, donning/doffing, monitoring of skin integrity and circulation, HEP via successful teachback and demonstration  Patient instructed to contact office with questions or concerns

## 2022-01-31 NOTE — PROGRESS NOTES
ASSESSMENT/PLAN:      46-year-old female with right lateral epicondylitis  The anatomy and physiology of lateral epicondylitis was discussed with the patient today  Typically, a traumatic injury or repetitive use may cause a partial or complete tear of the extensor carpi radialis brevis muscle  This creates pain over the lateral epicondyle  This pain typically is made worse with palm down lifting activities as well as anything that involves strength and stability of the wrist   The pain may radiate from the wrist up to the elbow  At times, the shoulder may be weak as well which can predispose or cause continuation of the problem  Conservative treatment usually cures a majority of patients; however, this may take up to 6-9 months  Conservative treatment options typically include activity modification, therapy for strengthening of the shoulder and elbow, nocturnal wrist support splints, tennis elbow straps, and possible corticosteroid injections  Corticosteroid injections do not change the natural history of this process, may decrease the pain temporarily, and may increase the risk of recurrence  Surgery is required in fewer than 10% of patients  Patient will begin with splinting her wrist at night as well as attending hand therapy and working on her exercises at home  I would not recommend any corticosteroid injections or oral anti inflammatories as she is scheduled to undergo a TKA in the near future  She may continue activities as tolerated  I will see her back in 8 week for re-evaluation        The patient verbalized understanding of exam findings and treatment plan  We engaged in the shared decision-making process and treatment options were discussed at length with the patient  Surgical and conservative management discussed today along with risks and benefits      Diagnoses and all orders for this visit:    Lateral epicondylitis of right elbow  -     Ambulatory Referral to PT/OT Hand Therapy; Future    Pain in right elbow  -     XR elbow 3+ vw right; Future    Other orders  -     venlafaxine (EFFEXOR-XR) 75 mg 24 hr capsule; Take 1 capsule by mouth in the morning          Follow Up:  Return in about 8 weeks (around 3/28/2022)  To Do Next Visit:  Re-evaluation of current issue    ____________________________________________________________________________________________________________________________________________      CHIEF COMPLAINT:  Chief Complaint   Patient presents with    Right Elbow - Pain       SUBJECTIVE:  Hussain Johnson is a 49y o  year old RHD female who presents to the office today for a evaluation of her right elbow  Patient states she had a flared up her right elbow 2-3 weeks ago with no known incident of injury  She does states she was lifting at that time for work  Patient notes most pain to the lateral aspect her elbow  This is worse with lifting and grasping objects as well as using a taping gun  She notes no numbness or tingling  She has been using a compression brace about the elbow which has provided her with some relief  She has not been taking any medication regards to her pain  She is scheduled to undergo TKA in the end of February      I have personally reviewed all the relevant PMH, PSH, SH, FH, Medications and allergies       PAST MEDICAL HISTORY:  Past Medical History:   Diagnosis Date    Anxiety     Arthritis     oa cassandra knees    Asthma     good control- no medications    Yan's esophagus     Bipolar affective disorder (HCC)     Chronic pain disorder     lumbar    CPAP (continuous positive airway pressure) dependence     Degenerative disc disease at L5-S1 level     Deliberate self-cutting     Depression 09/16/2008    Disease of thyroid gland     hypo    MARTINEZ (dyspnea on exertion)     Drug overdose 10/28/2008    suicide attempt    Dysphagia     Dyspnea     Edema     BLE    GERD (gastroesophageal reflux disease)     High blood pressure     History of COVID-19 12/30/2020    Symptoms started on 12/30/2020 and then got worse  Today she is feeling a little bit better  She is a candidate for monoclonal antibody infusion and set for 1/6/21 @ 1pm at [facility]  01/07/21 - telemedicine -     Hypertension     Knee pain, bilateral     Especially right    Migraines     Obese     Overactive bladder     Sjogren's disease ([address] )     Sleep apnea     Stress incontinence     Suicidal ideations     Umbilical hernia     surgical repair today 4/24/2019    Use of cane as ambulatory aid     awaiting b/l knee replacement    Wears glasses        PAST SURGICAL HISTORY:  Past Surgical History:   Procedure Laterality Date    BREAST BIOPSY Right 1989    benign    CARPAL TUNNEL RELEASE Left     CHOLECYSTECTOMY  05/2003    Laparoscopic    COLONOSCOPY      01/12/2009    DILATION AND CURETTAGE OF UTERUS      ELBOW SURGERY Left     x2   No hardware    ESOPHAGOGASTRODUODENOSCOPY      FOOT SURGERY Left     Plantar fasciotomy    HYSTERECTOMY      laporoscopic, partial    KNEE ARTHROSCOPY Bilateral     OOPHORECTOMY Left 10/2015    WY ANAL SPHINCTEROTOMY N/A 8/31/2018    Procedure: EUA, LEFT PARTIAL INTERNAL SPHINCTEROTOMY;  Surgeon: Jeannine Mcdaniel MD;  Location: [address] [place] [address] MAIN OR;  Service: Colorectal    WY GASTROCNEMIUS RECESSION Left 2/24/2021    Procedure: RECESSION GASTROC OPEN;  Surgeon: Kwabena Bryson DPM;  Location: [address] [place] [address];  Service: [facility] 38 [id number],[id number] N/A 4/24/2019    Procedure: REPAIR HERNIA UMBILICAL LAPAROSCOPIC W/ ROBOTICS;  Surgeon: Wild Melara MD;  Location: [facility] Main OR;  Service: General    REDUCTION MAMMAPLASTY Bilateral 1999    REPAIR LACERATION Left     left hand-5/18/2009    ROTATOR CUFF REPAIR Left     TONSILECTOMY AND ADNOIDECTOMY      TONSILLECTOMY      US GUIDED MSK PROCEDURE  4/22/2021    VEIN LIGATION Bilateral     WISDOM TOOTH EXTRACTION         FAMILY HISTORY:  Family History   Problem Relation Age of Onset    Kidney cancer Mother     Diabetes Mother     Colon cancer Family     No Known Problems Father     No Known Problems Sister     Colon cancer Paternal Uncle     Colon cancer Maternal Uncle     Colon cancer Maternal Uncle        SOCIAL HISTORY:  Social History     Tobacco Use    Smoking status: Former Smoker     Packs/day: 3 00     Types: Cigarettes     Quit date: 2018     Years since quittin 0    Smokeless tobacco: Never Used    Tobacco comment: Started at age 14     Vaping Use    Vaping Use: Some days    Substances: THC   Substance Use Topics    Alcohol use: Not Currently     Comment: Recovering alcoholic    Drug use: Yes     Types: Marijuana, Methamphetamines     Comment: medical- vapes 3 times per wk at        MEDICATIONS:    Current Outpatient Medications:     albuterol (PROVENTIL HFA,VENTOLIN HFA) 90 mcg/act inhaler, Inhale 2 puffs every 6 (six) hours as needed for wheezing, Disp: 8 g, Rfl: 0    amLODIPine (NORVASC) 5 mg tablet, Take 1 tablet (5 mg total) by mouth daily, Disp: 30 tablet, Rfl: 3    buPROPion (WELLBUTRIN XL) 300 mg 24 hr tablet, Take 1 tablet (300 mg total) by mouth daily (Patient taking differently: Take 450 mg by mouth daily  ), Disp: 30 tablet, Rfl: 0    Cholecalciferol (Vitamin D3) 125 MCG (5000 UT) TABS, Take 5,000 Units by mouth daily, Disp: , Rfl:     colestipol (COLESTID) 1 g tablet, Take 1 tablet (1 g total) by mouth 2 (two) times a day, Disp: 60 tablet, Rfl: 0    divalproex sodium (DEPAKOTE ER) 250 mg 24 hr tablet, Take 3 tablets (750 mg total) by mouth daily (Patient taking differently: Take 1,000 mg by mouth daily  ), Disp: 90 tablet, Rfl: 0    estradiol (ESTRACE) 1 mg tablet, Take 1 tablet (1 mg total) by mouth daily (Patient taking differently: Take 0 5 mg by mouth daily  ), Disp: 30 tablet, Rfl: 3    fluticasone (FLOVENT HFA) 110 MCG/ACT inhaler, Inhale 2 puffs 2 (two) times a day Rinse mouth after use , Disp: , Rfl:    fluticasone (Flovent HFA) 44 mcg/act inhaler, Inhale 2 puffs 2 (two) times a day Rinse mouth after use   (Patient taking differently: Inhale 2 puffs as needed Rinse mouth after use  ), Disp: 10 6 g, Rfl: 3    furosemide (LASIX) 20 mg tablet, Take 1 tablet (20 mg total) by mouth daily, Disp: 30 tablet, Rfl: 0    haloperidol decanoate (Haldol Decanoate) 50 mg/mL injection, Inject 50 mg into a muscle every 30 (thirty) days, Disp: , Rfl:     levothyroxine 75 mcg tablet, Take 1 tablet (75 mcg total) by mouth daily in the early morning, Disp: 30 tablet, Rfl: 0    losartan (COZAAR) 50 mg tablet, Take 1 tablet (50 mg total) by mouth daily, Disp: 30 tablet, Rfl: 0    lurasidone 60 MG TABS, Take 1 tablet (60 mg total) by mouth daily with dinner, Disp: 30 tablet, Rfl: 0    metoprolol tartrate (LOPRESSOR) 25 mg tablet, Take 1 tablet (25 mg total) by mouth every 12 (twelve) hours, Disp: 60 tablet, Rfl: 0    pilocarpine (SALAGEN) 5 mg tablet, Take 5 mg by mouth 3 (three) times a day, Disp: , Rfl:     RABEprazole (Aciphex) 20 MG tablet, Take 20 mg by mouth daily, Disp: , Rfl:     topiramate (TOPAMAX) 100 mg tablet, Take 1 tablet (100 mg total) by mouth 2 (two) times a day, Disp: 60 tablet, Rfl: 0    Toviaz 8 MG TB24, TAKE 1 TABLET (8 MG TOTAL) BY MOUTH DAILY (Patient taking differently: Take 4 mg by mouth daily  ), Disp: 30 tablet, Rfl: 3    venlafaxine (EFFEXOR-XR) 75 mg 24 hr capsule, Take 1 capsule by mouth in the morning, Disp: , Rfl:     gabapentin (NEURONTIN) 100 mg capsule, Take 1 capsule (100 mg total) by mouth 3 (three) times a day (Patient not taking: Reported on 1/31/2022 ), Disp: 60 capsule, Rfl: 0    haloperidol (HALDOL) 5 mg tablet, Take 1 tablet (5 mg total) by mouth daily at bedtime (Patient not taking: Reported on 1/31/2022 ), Disp: 30 tablet, Rfl: 0    melatonin 3 mg, Take 1 tablet (3 mg total) by mouth daily at bedtime (Patient not taking: Reported on 1/31/2022 ), Disp: 30 tablet, Rfl: 0   pantoprazole (PROTONIX) 20 mg tablet, Take 1 tablet (20 mg total) by mouth daily in the early morning (Patient not taking: Reported on 1/18/2022 ), Disp: 30 tablet, Rfl: 0    ALLERGIES:  Allergies   Allergen Reactions    Percocet [Oxycodone-Acetaminophen] Headache     Severe headaches   (denies issues with Tylenol)    Betadine [Povidone Iodine] Rash     Unsure if betadine or gauze post surgical  Got rash on leg  REVIEW OF SYSTEMS:  Review of Systems   Constitutional: Negative for chills, fever and unexpected weight change  HENT: Negative for hearing loss, nosebleeds and sore throat  Eyes: Negative for pain, redness and visual disturbance  Respiratory: Negative for cough, shortness of breath and wheezing  Cardiovascular: Negative for chest pain, palpitations and leg swelling  Gastrointestinal: Negative for abdominal pain, nausea and vomiting  Endocrine: Negative for polydipsia and polyuria  Genitourinary: Negative for dyspareunia and hematuria  Musculoskeletal: Positive for myalgias  Negative for arthralgias and joint swelling  Skin: Negative for rash and wound  Neurological: Negative for dizziness, numbness and headaches  Psychiatric/Behavioral: Negative for decreased concentration and suicidal ideas  The patient is not nervous/anxious          VITALS:  Vitals:    01/31/22 0821   BP: 120/81   Pulse: 83   Resp: 17       LABS:  HgA1c:   Lab Results   Component Value Date    HGBA1C 4 9 12/18/2020     BMP:   Lab Results   Component Value Date    CALCIUM 8 7 01/06/2022    K 3 9 01/06/2022    CO2 24 01/06/2022     01/06/2022    BUN 12 01/06/2022    CREATININE 0 66 01/06/2022       _____________________________________________________  PHYSICAL EXAMINATION:  General: well developed and well nourished, alert, oriented times 3 and appears comfortable  Psychiatric: Normal  HEENT: Normocephalic, Atraumatic Trachea Midline, No torticollis  Pulmonary: No audible wheezing or respiratory distress   Cardiovascular: No pitting edema, 2+ radial pulse   Abdominal/GI: abdomen non tender, non distended   Skin: No masses, erythema, lacerations, fluctation, ulcerations  Neurovascular: Sensation Intact to the Median, Ulnar, Radial Nerve, Motor Intact to the Median, Ulnar, Radial Nerve and Pulses Intact  Musculoskeletal: Normal, except as noted in detailed exam and in HPI  MUSCULOSKELETAL EXAMINATION:  Right Elbow:    No swelling or deformity  Full range of motion with flexion, extension, supination and pronation  Positive tenderness to palpation over the Lateral Epicondyle  Minimal Pain with passive flexion of the wrist with elbow fully extended  Pain with resisted wrist extension and long finger extension with elbow fully extended  Negative tinels over the ulnar nerve at the elbow    Brisk capillary refill noted to all digits             ___________________________________________________  STUDIES REVIEWED:  I have personally reviewed AP lateral and oblique radiographs of right elbow obtained on 1/31/22 which demonstrate no acute fractures or osseous abnormalities           PROCEDURES PERFORMED:  Procedures  No Procedures performed today    _____________________________________________________      Babak Diggs    I,:  Nicki Swanson am acting as a scribe while in the presence of the attending physician :       I,:  Kiera Pond MD personally performed the services described in this documentation    as scribed in my presence :

## 2022-01-31 NOTE — LETTER
January 31, 2022     Patient: Andres Elmore   YOB: 1970   Date of Visit: 1/31/2022       To Whom it May Concern:    Chet Garcia is under my professional care  She was seen in my office on 1/31/2022  She please excuse her this morning as she has an appointment  If you have any questions or concerns, please don't hesitate to call           Sincerely,          Elías Head MD        CC: No Recipients

## 2022-02-01 ENCOUNTER — APPOINTMENT (OUTPATIENT)
Dept: LAB | Facility: CLINIC | Age: 52
End: 2022-02-01
Payer: MEDICARE

## 2022-02-01 DIAGNOSIS — Z01.818 ENCOUNTER FOR PREADMISSION TESTING: ICD-10-CM

## 2022-02-01 DIAGNOSIS — Z51.81 ENCOUNTER FOR THERAPEUTIC DRUG MONITORING: ICD-10-CM

## 2022-02-01 LAB
ALBUMIN SERPL BCP-MCNC: 3.4 G/DL (ref 3.5–5)
ALP SERPL-CCNC: 64 U/L (ref 46–116)
ALT SERPL W P-5'-P-CCNC: 16 U/L (ref 12–78)
ANION GAP SERPL CALCULATED.3IONS-SCNC: 4 MMOL/L (ref 4–13)
APTT PPP: 31 SECONDS (ref 23–37)
AST SERPL W P-5'-P-CCNC: 10 U/L (ref 5–45)
BASOPHILS # BLD AUTO: 0.04 THOUSANDS/ΜL (ref 0–0.1)
BASOPHILS NFR BLD AUTO: 0 % (ref 0–1)
BILIRUB SERPL-MCNC: 0.4 MG/DL (ref 0.2–1)
BUN SERPL-MCNC: 13 MG/DL (ref 5–25)
CALCIUM ALBUM COR SERPL-MCNC: 9.6 MG/DL (ref 8.3–10.1)
CALCIUM SERPL-MCNC: 9.1 MG/DL (ref 8.3–10.1)
CHLORIDE SERPL-SCNC: 108 MMOL/L (ref 100–108)
CO2 SERPL-SCNC: 28 MMOL/L (ref 21–32)
CREAT SERPL-MCNC: 0.74 MG/DL (ref 0.6–1.3)
EOSINOPHIL # BLD AUTO: 0.05 THOUSAND/ΜL (ref 0–0.61)
EOSINOPHIL NFR BLD AUTO: 1 % (ref 0–6)
ERYTHROCYTE [DISTWIDTH] IN BLOOD BY AUTOMATED COUNT: 13.3 % (ref 11.6–15.1)
GFR SERPL CREATININE-BSD FRML MDRD: 94 ML/MIN/1.73SQ M
GLUCOSE P FAST SERPL-MCNC: 89 MG/DL (ref 65–99)
HCT VFR BLD AUTO: 45.7 % (ref 34.8–46.1)
HGB BLD-MCNC: 14.5 G/DL (ref 11.5–15.4)
IMM GRANULOCYTES # BLD AUTO: 0.07 THOUSAND/UL (ref 0–0.2)
IMM GRANULOCYTES NFR BLD AUTO: 1 % (ref 0–2)
INR PPP: 0.97 (ref 0.84–1.19)
LYMPHOCYTES # BLD AUTO: 2.89 THOUSANDS/ΜL (ref 0.6–4.47)
LYMPHOCYTES NFR BLD AUTO: 29 % (ref 14–44)
MCH RBC QN AUTO: 29.4 PG (ref 26.8–34.3)
MCHC RBC AUTO-ENTMCNC: 31.7 G/DL (ref 31.4–37.4)
MCV RBC AUTO: 93 FL (ref 82–98)
MONOCYTES # BLD AUTO: 0.83 THOUSAND/ΜL (ref 0.17–1.22)
MONOCYTES NFR BLD AUTO: 8 % (ref 4–12)
NEUTROPHILS # BLD AUTO: 6.04 THOUSANDS/ΜL (ref 1.85–7.62)
NEUTS SEG NFR BLD AUTO: 61 % (ref 43–75)
NRBC BLD AUTO-RTO: 0 /100 WBCS
PMV BLD AUTO: 11.8 FL (ref 8.9–12.7)
POTASSIUM SERPL-SCNC: 4.1 MMOL/L (ref 3.5–5.3)
PROT SERPL-MCNC: 7.4 G/DL (ref 6.4–8.2)
PROTHROMBIN TIME: 12.5 SECONDS (ref 11.6–14.5)
RBC # BLD AUTO: 4.93 MILLION/UL (ref 3.81–5.12)
SODIUM SERPL-SCNC: 140 MMOL/L (ref 136–145)
WBC # BLD AUTO: 9.92 THOUSAND/UL (ref 4.31–10.16)

## 2022-02-01 PROCEDURE — 36415 COLL VENOUS BLD VENIPUNCTURE: CPT

## 2022-02-01 PROCEDURE — 85730 THROMBOPLASTIN TIME PARTIAL: CPT

## 2022-02-01 PROCEDURE — 85025 COMPLETE CBC W/AUTO DIFF WBC: CPT

## 2022-02-01 PROCEDURE — 85610 PROTHROMBIN TIME: CPT

## 2022-02-01 PROCEDURE — 80053 COMPREHEN METABOLIC PANEL: CPT

## 2022-02-10 ENCOUNTER — CONSULT (OUTPATIENT)
Dept: FAMILY MEDICINE CLINIC | Facility: CLINIC | Age: 52
End: 2022-02-10
Payer: MEDICARE

## 2022-02-10 ENCOUNTER — TELEPHONE (OUTPATIENT)
Dept: FAMILY MEDICINE CLINIC | Facility: CLINIC | Age: 52
End: 2022-02-10

## 2022-02-10 VITALS
DIASTOLIC BLOOD PRESSURE: 74 MMHG | BODY MASS INDEX: 45.85 KG/M2 | WEIGHT: 275.2 LBS | SYSTOLIC BLOOD PRESSURE: 122 MMHG | TEMPERATURE: 97.8 F | HEIGHT: 65 IN | OXYGEN SATURATION: 100 % | RESPIRATION RATE: 18 BRPM | HEART RATE: 71 BPM

## 2022-02-10 DIAGNOSIS — F32.2 CURRENT SEVERE EPISODE OF MAJOR DEPRESSIVE DISORDER WITHOUT PSYCHOTIC FEATURES WITHOUT PRIOR EPISODE (HCC): ICD-10-CM

## 2022-02-10 DIAGNOSIS — G43.709 CHRONIC MIGRAINE WITHOUT AURA WITHOUT STATUS MIGRAINOSUS, NOT INTRACTABLE: ICD-10-CM

## 2022-02-10 DIAGNOSIS — Z01.818 PRE-OPERATIVE EXAMINATION: ICD-10-CM

## 2022-02-10 DIAGNOSIS — R41.89 IMPAIRED COGNITION: ICD-10-CM

## 2022-02-10 DIAGNOSIS — M17.11 PRIMARY OSTEOARTHRITIS OF RIGHT KNEE: Primary | ICD-10-CM

## 2022-02-10 DIAGNOSIS — E66.01 MORBID OBESITY WITH BMI OF 45.0-49.9, ADULT (HCC): ICD-10-CM

## 2022-02-10 PROCEDURE — 99214 OFFICE O/P EST MOD 30 MIN: CPT | Performed by: NURSE PRACTITIONER

## 2022-02-10 RX ORDER — FESOTERODINE FUMARATE 4 MG/1
1 TABLET, FILM COATED, EXTENDED RELEASE ORAL DAILY
COMMUNITY

## 2022-02-10 RX ORDER — DIVALPROEX SODIUM 500 MG/1
1000 TABLET, EXTENDED RELEASE ORAL DAILY
COMMUNITY
End: 2022-07-15

## 2022-02-10 NOTE — PROGRESS NOTES
BMI Counseling: Body mass index is 45 8 kg/m²  The BMI is above normal  Nutrition recommendations include reducing portion sizes, decreasing overall calorie intake, 3-5 servings of fruits/vegetables daily, reducing fast food intake, consuming healthier snacks, decreasing soda and/or juice intake, moderation in carbohydrate intake, increasing intake of lean protein, reducing intake of saturated fat and trans fat and reducing intake of cholesterol  Exercise recommendations include moderate aerobic physical activity for 150 minutes/week, exercising 3-5 times per week, joining a gym and strength training exercises  [facility] PRE-OPERATIVE EVALUATION  [facility] - [facility]    NAME: Sarina Hill  AGE: 49 y o  SEX: female  : 1970     DATE: 2/10/2022    [facility] Pre-Operative Evaluation      Chief Complaint: Pre-operative Evaluation     Surgery: right TKA  Anticipated Date of Surgery: 2022  Referring Provider: Dr Nissa Jacobs      History of Present Illness:     Sarina Hill is a 49 y o  female who presents to the office today for a preoperative consultation at the request of surgeon, Dr Nissa Jacobs, who plans on performing right TKA on   Planned anesthesia is none  Patient has a bleeding risk of: no recent abnormal bleeding  Patient does not have objections to receiving blood products if needed  Current anti-platelet/anti-coagulation medications that the patient is prescribed includes: none        Assessment of Chronic Conditions:   - COPD: stable  - Hypertension: stable     Assessment of Cardiac Risk:  · Denies unstable or severe angina or MI in the last 6 weeks or history of stent placement in the last year   · Denies decompensated heart failure (e g  New onset heart failure, NYHA functional class IV heart failure, or worsening existing heart failure)  · Denies significant arrhythmias such as high grade AV block, symptomatic ventricular arrhythmia, newly recognized ventricular tachycardia, supraventricular tachycardia with resting heart rate >100, or symptomatic bradycardia  · Denies severe heart valve disease including aortic stenosis or symptomatic mitral stenosis     Exercise Capacity:  · Able to walk 4 blocks without symptoms?: No, knee pain and breathing  · Able to walk 2 flights without symptoms?: Yes, knee pain or breathing    Prior Anesthesia Reactions: No     Personal history of venous thromboembolic disease? No    History of steroid use for >2 weeks within last year? No         Review of Systems:     Review of Systems   Constitutional: Positive for activity change  Negative for appetite change, chills, fatigue and fever  HENT: Negative for congestion, ear pain and sore throat  Eyes: Negative for pain and visual disturbance  Respiratory: Negative for cough, chest tightness, shortness of breath and wheezing  Cardiovascular: Negative for chest pain and palpitations  Gastrointestinal: Negative for abdominal pain, constipation, diarrhea, nausea and vomiting  Genitourinary: Negative for dysuria, frequency, hematuria and urgency  Musculoskeletal: Positive for arthralgias, back pain and gait problem  Skin: Negative for color change and rash  Neurological: Negative for dizziness, seizures, syncope and headaches  Psychiatric/Behavioral: The patient is not nervous/anxious  All other systems reviewed and are negative        Current Problem List:     Patient Active Problem List   Diagnosis    Yan's esophagus determined by biopsy    Benign essential hypertension    [facility], [facility]    Chronic low back pain    DDD (degenerative disc disease), lumbar    Edema    Family history of renal cancer    Hypothyroidism    Microhematuria    MAG (obstructive sleep apnea)    Spondylosis of lumbosacral joint without myelopathy    Overactive bladder    Primary osteoarthritis of both knees    Major depressive disorder    Sjogren's syndrome ([facility]    Marijuana abuse    COPD (chronic obstructive pulmonary disease) ([facility])    Mixed hyperlipidemia    Class 3 severe obesity due to excess calories with serious comorbidity and body mass index (BMI) of 40 0 to 44 9 in adult [facility])    Memory difficulties    History of COVID-19    Morbid obesity with BMI of 40 0-44 9, adult ([facility])    Hemorrhage of rectum and anus    Medial epicondylitis of left elbow    Restrictive lung disease    Chronic tension-type headache, intractable    Potential for cognitive impairment    Mood disorder ([facility])    Substance abuse ([facility])    Cognitive impairment    Contusion of elbow    GERD (gastroesophageal reflux disease)    Diarrhea    Ecchymosis    Anxiety    Borderline personality disorder ([facility])    Platelets decreased ([facility])       Allergies: Allergies   Allergen Reactions    Percocet [Oxycodone-Acetaminophen] Headache     Severe headaches   (denies issues with Tylenol)    Betadine [Povidone Iodine] Rash     Unsure if betadine or gauze post surgical  Got rash on leg          Current Medications:       Current Outpatient Medications:     albuterol (PROVENTIL HFA,VENTOLIN HFA) 90 mcg/act inhaler, Inhale 2 puffs every 6 (six) hours as needed for wheezing, Disp: 8 g, Rfl: 0    amLODIPine (NORVASC) 5 mg tablet, Take 1 tablet (5 mg total) by mouth daily, Disp: 30 tablet, Rfl: 3    buPROPion (WELLBUTRIN XL) 300 mg 24 hr tablet, Take 1 tablet (300 mg total) by mouth daily (Patient taking differently: Take 450 mg by mouth daily  ), Disp: 30 tablet, Rfl: 0    Cholecalciferol (Vitamin D3) 125 MCG (5000 UT) TABS, Take 5,000 Units by mouth daily, Disp: , Rfl:     colestipol (COLESTID) 1 g tablet, Take 1 tablet (1 g total) by mouth 2 (two) times a day, Disp: 60 tablet, Rfl: 0    divalproex sodium (DEPAKOTE ER) 500 mg 24 hr tablet, Take 1,000 mg by mouth daily, Disp: , Rfl:     estradiol (ESTRACE) 1 mg tablet, Take 1 tablet (1 mg total) by mouth daily (Patient taking differently: Take 0 5 mg by mouth daily  ), Disp: 30 tablet, Rfl: 3    Fesoterodine Fumarate ER (Toviaz) 4 MG TB24, Take 1 tablet by mouth daily, Disp: , Rfl:     fluticasone (FLOVENT HFA) 110 MCG/ACT inhaler, Inhale 2 puffs 2 (two) times a day Rinse mouth after use , Disp: , Rfl:     furosemide (LASIX) 20 mg tablet, Take 1 tablet (20 mg total) by mouth daily, Disp: 30 tablet, Rfl: 0    haloperidol decanoate (Haldol Decanoate) 50 mg/mL injection, Inject 50 mg into a muscle every 30 (thirty) days, Disp: , Rfl:     levothyroxine 75 mcg tablet, Take 1 tablet (75 mcg total) by mouth daily in the early morning, Disp: 30 tablet, Rfl: 0    losartan (COZAAR) 50 mg tablet, Take 1 tablet (50 mg total) by mouth daily, Disp: 30 tablet, Rfl: 0    lurasidone 60 MG TABS, Take 1 tablet (60 mg total) by mouth daily with dinner, Disp: 30 tablet, Rfl: 0    metoprolol tartrate (LOPRESSOR) 25 mg tablet, Take 1 tablet (25 mg total) by mouth every 12 (twelve) hours, Disp: 60 tablet, Rfl: 0    pilocarpine (SALAGEN) 5 mg tablet, Take 5 mg by mouth 3 (three) times a day, Disp: , Rfl:     RABEprazole (Aciphex) 20 MG tablet, Take 20 mg by mouth daily, Disp: , Rfl:     topiramate (TOPAMAX) 100 mg tablet, Take 1 tablet (100 mg total) by mouth 2 (two) times a day, Disp: 60 tablet, Rfl: 0    venlafaxine (EFFEXOR-XR) 75 mg 24 hr capsule, Take 1 capsule by mouth in the morning, Disp: , Rfl:     divalproex sodium (DEPAKOTE ER) 250 mg 24 hr tablet, Take 3 tablets (750 mg total) by mouth daily (Patient taking differently: Take 1,000 mg by mouth daily  ), Disp: 90 tablet, Rfl: 0    Toviaz 8 MG TB24, TAKE 1 TABLET (8 MG TOTAL) BY MOUTH DAILY (Patient taking differently: Take 4 mg by mouth daily  ), Disp: 30 tablet, Rfl: 3    Past Medical History:       Past Medical History:   Diagnosis Date    Anxiety     Arthritis     oa cassandra knees    Asthma     good control- no medications    Yan's esophagus     Bipolar affective disorder (HCC)     Chronic pain disorder     lumbar    CPAP (continuous positive airway pressure) dependence     Degenerative disc disease at L5-S1 level     Deliberate self-cutting     Depression 09/16/2008    Disease of thyroid gland     hypo    MARTINEZ (dyspnea on exertion)     Drug overdose 10/28/2008    suicide attempt    Dysphagia     Dyspnea     Edema     BLE    GERD (gastroesophageal reflux disease)     High blood pressure     History of COVID-19 12/30/2020    Symptoms started on 12/30/2020 and then got worse  Today she is feeling a little bit better  She is a candidate for monoclonal antibody infusion and set for 1/6/21 @ 1pm at [facility]  01/07/21 - telemedicine -     Hypertension     Knee pain, bilateral     Especially right    Migraines     Obese     Overactive bladder     Sjogren's disease ([address] )     Sleep apnea     Stress incontinence     Suicidal ideations     Umbilical hernia     surgical repair today 4/24/2019    Use of cane as ambulatory aid     awaiting b/l knee replacement    Wears glasses         Past Surgical History:   Procedure Laterality Date    BREAST BIOPSY Right 1989    benign    CARPAL TUNNEL RELEASE Left     CHOLECYSTECTOMY  05/2003    Laparoscopic    COLONOSCOPY      01/12/2009    DILATION AND CURETTAGE OF UTERUS      ELBOW SURGERY Left     x2   No hardware    ESOPHAGOGASTRODUODENOSCOPY      FOOT SURGERY Left     Plantar fasciotomy    HYSTERECTOMY      laporoscopic, partial    KNEE ARTHROSCOPY Bilateral     OOPHORECTOMY Left 10/2015    [place] ANAL SPHINCTEROTOMY N/A 8/31/2018    Procedure: EUA, LEFT PARTIAL INTERNAL SPHINCTEROTOMY;  Surgeon: Amber Helm MD;  Location: [address] [place] [address] OR;  Service: Colorectal    [place] GASTROCNEMIUS RECESSION Left 2/24/2021    Procedure: RECESSION GASTROC OPEN;  Surgeon: Isabell Galloway DPM;  Location: [address] [place] [address];  Service: [facility] HPTU,5+G/G,WMAZM N/A 4/24/2019    Procedure: REPAIR HERNIA UMBILICAL LAPAROSCOPIC W/ ROBOTICS;  Surgeon: Lexi Carter MD;  Location: [facility];  Service: General    REDUCTION MAMMAPLASTY Bilateral 1999    REPAIR LACERATION Left     left hand-2009    ROTATOR CUFF REPAIR Left     TONSILECTOMY AND ADNOIDECTOMY      TONSILLECTOMY      US GUIDED MSK PROCEDURE  2021    VEIN LIGATION Bilateral     WISDOM TOOTH EXTRACTION          Family History   Problem Relation Age of Onset    Kidney cancer Mother     Diabetes Mother     Colon cancer Family     No Known Problems Father     No Known Problems Sister     Colon cancer Paternal Uncle     Colon cancer Maternal Uncle     Colon cancer Maternal Uncle         Social History     Socioeconomic History    Marital status: Single     Spouse name: Not on file    Number of children: Not on file    Years of education: Not on file    Highest education level: Not on file   Occupational History    Not on file   Tobacco Use    Smoking status: Former Smoker     Packs/day: 3 00     Types: Cigarettes     Quit date: 2018     Years since quittin 1    Smokeless tobacco: Never Used    Tobacco comment: Started at age 14     Vaping Use    Vaping Use: Some days    Substances: THC   Substance and Sexual Activity    Alcohol use: Not Currently     Comment: Recovering alcoholic    Drug use: Yes     Types: Marijuana, Methamphetamines     Comment: medical- vapes 3 times per wk at hs    Sexual activity: Not Currently   Other Topics Concern    Not on file   Social History Narrative    Not on file     Social Determinants of Health     Financial Resource Strain: Not on file   Food Insecurity: Not on file   Transportation Needs: Not on file   Physical Activity: Not on file   Stress: Not on file   Social Connections: Not on file   Intimate Partner Violence: Not on file   Housing Stability: Not on file        Physical Exam:     /74 (BP Location: Left arm, Patient Position: Sitting, Cuff Size: Large)   Pulse 71 Temp 97 8 °F (36 6 °C) (Tympanic)   Resp 18   Ht 5' 5" (1 651 m)   Wt 125 kg (275 lb 3 2 oz)   SpO2 100%   BMI 45 80 kg/m²     Physical Exam  Vitals reviewed  Constitutional:       Appearance: Normal appearance  She is well-developed  She is obese  HENT:      Head: Normocephalic  Right Ear: Tympanic membrane, ear canal and external ear normal       Left Ear: Ear canal and external ear normal    Eyes:      General:         Right eye: No discharge  Left eye: No discharge  Conjunctiva/sclera: Conjunctivae normal       Pupils: Pupils are equal, round, and reactive to light  Neck:      Thyroid: No thyromegaly  Cardiovascular:      Rate and Rhythm: Normal rate and regular rhythm  Pulses: Normal pulses  Heart sounds: Normal heart sounds  No murmur heard  Pulmonary:      Effort: Pulmonary effort is normal       Breath sounds: Normal breath sounds  Abdominal:      General: Bowel sounds are normal       Palpations: Abdomen is soft  Tenderness: There is no abdominal tenderness  Musculoskeletal:         General: Tenderness present  Normal range of motion  Cervical back: Normal range of motion and neck supple  Legs:    Lymphadenopathy:      Cervical: No cervical adenopathy  Skin:     General: Skin is warm  Findings: No rash  Neurological:      General: No focal deficit present  Mental Status: She is alert and oriented to person, place, and time  Psychiatric:         Mood and Affect: Mood normal          Behavior: Behavior normal          Thought Content: Thought content normal          Judgment: Judgment normal           Data:     Pre-operative work-up    Laboratory Results: I have personally reviewed the pertinent laboratory results/reports      EKG: I have personally reviewed pertinent reports        Chest x-ray: not ordered       Previous cardiopulmonary studies within the past year:  · Echocardiogram: none  · Cardiac Catheterization: none  · Stress Test: none  · Pulmonary Function Testing: none      Assessment & Recommendations:     1  Primary osteoarthritis of right knee     2  Pre-operative examination     3  Morbid obesity with BMI of 45 0-49 9, adult ([facility])         Pre-Op Evaluation Assessment  49 y o  female with planned surgery: right TKA  Known risk factors for perioperative complications: Coronary disease  Chronic pulmonary disease  Current medications which may produce withdrawal symptoms if withheld perioperatively:   Pre-Op Evaluation Plan  1  Further preoperative workup as follows:   - None; no further preoperative work-up is required    2  Medication Management/Recommendations:   - None, continue medication regimen including morning of surgery, with sip of water    3  Prophylaxis for cardiac events with perioperative beta-blockers: not indicated  4  Patient requires further consultation with: None    Clearance  Patient is CLEARED for surgery without any additional cardiac testing       Anupam Caban, [place] [address] [place] [id number]  Phone#  [phone]  Fax#  [phone]

## 2022-02-21 ENCOUNTER — DOCUMENTATION (OUTPATIENT)
Dept: SLEEP CENTER | Facility: CLINIC | Age: 52
End: 2022-02-21

## 2022-02-21 ENCOUNTER — OFFICE VISIT (OUTPATIENT)
Dept: SLEEP CENTER | Facility: CLINIC | Age: 52
End: 2022-02-21
Payer: MEDICARE

## 2022-02-21 VITALS
HEART RATE: 66 BPM | SYSTOLIC BLOOD PRESSURE: 136 MMHG | BODY MASS INDEX: 44.52 KG/M2 | WEIGHT: 277 LBS | HEIGHT: 66 IN | DIASTOLIC BLOOD PRESSURE: 64 MMHG

## 2022-02-21 DIAGNOSIS — F19.10 SUBSTANCE ABUSE (HCC): ICD-10-CM

## 2022-02-21 DIAGNOSIS — G47.33 OSA (OBSTRUCTIVE SLEEP APNEA): Chronic | ICD-10-CM

## 2022-02-21 DIAGNOSIS — F60.3 BORDERLINE PERSONALITY DISORDER (HCC): ICD-10-CM

## 2022-02-21 DIAGNOSIS — F33.2 SEVERE EPISODE OF RECURRENT MAJOR DEPRESSIVE DISORDER, WITHOUT PSYCHOTIC FEATURES (HCC): ICD-10-CM

## 2022-02-21 DIAGNOSIS — J44.9 CHRONIC OBSTRUCTIVE PULMONARY DISEASE, UNSPECIFIED COPD TYPE (HCC): Primary | Chronic | ICD-10-CM

## 2022-02-21 PROCEDURE — 99203 OFFICE O/P NEW LOW 30 MIN: CPT | Performed by: INTERNAL MEDICINE

## 2022-02-21 NOTE — PROGRESS NOTES
Sleep Consultation   Parminder Squires 49 y o  female MRN: [id number]      Reason for consultation: MAG (obstructive sleep apnea)     Requesting physician: Estela Angeles PA-C    Assessment/Plan  #MAG  #COPD  #Substance abuse  #MDD    She was previously diagnosed with sleep apnea in 2018 via sleep study at [facility]  Her AHI at the time was 74 2 indicating severe sleep apnea  Per documentation "[facility] on 4/23/18 showed AHI of 74 2 events per hour (supine 74 2 and REM 88 5 events per hour) with oxygen woody of 58%  CPAP 18-20 cm H20 was effective  She is currently utilizing CPAP machine at a pressure of 10-20 cm H20; 90% pressure 14 9 cm H20  She uses full face mask  Patient using machine for an average of 8 25 hours per night with an AHI of 1 0 events per hour over the last month  She is currently utilizing DME [employer] and is receiving regular supplies " She has not followed up with their office since that time  She reports daily compliance with her CPAP device however, continues to report sleepiness through out the day despite use of her CPAP  At this time we will order a CPAP titration study with EtCO2 monitoring to asses if her sleepiness may have a component of hypercapnia given her extensive smoking history  She is in tobacco use remission which she was congratulated and encouraged to continue  She did report illicit substance used and was encouraged to discontinue it's use  Additionally, she reported recent upsetting family issues with the passing of her mother  She has been diagnosed with MDD and previously had suicide attempts  Depression could be playing a role in her sleepiness and anhedonia as well  She has a  and has multiple admissions for behavioral health  Will need psychiatric evaluation and potential adjustment to antidepressant regimen but will defer to PCP           History of Present Illness   HPI:  Parminder Squires is a 49 y o  female with PMHx as below who comes in for evaluation of obstructive sleep apnea  Patient notes 2 lb weight gain and symptoms of difficulty staying asleep she awakens 2-3 times per night, possible snoring, excessive daytime sleepiness,  awakenings with dry mouth but she states her mouth is always dry due to Sjogren's   she note symptoms of restless legs  she denies symptoms of cataplexy, sleep paralysis, hypnopompic or hypnagogic hallucinations  Sleep History:  she goes to bed at approximately 7-8PM, will get to sleep in a couple of min, will get out of bed at 5:30-7/8 AM   she will get up 2-3 times at night due to her elbows or her cat awakening her  It will then take a couple minutes to fall back asleep    she does nap during he day  The naps are 2 hours in duration and are not refreshing  Currently the patient is using medical marijuana to help them fall asleep occasionally  Previously used trazadone but does not use it anymore  She does take 1000 mg of depakote  Excessively sleepy during the day  She states she has been falling asleep easily while watching TV or doing other mundane tasks  She states she has had some difficulty with getting out of bed  She states she's been feeling sad since her mother passed  She was hospitalized up to 3 times for depression and suicide attempts  She was most recently discharged a little over a month ago  She was admitted at [facility]  She has a history of headaches but they have not been bothering her lately  She reports a history of illicit drug use  Last used methamphetamine 8 days ago  Since it's use she has been excessively tired throughout the day  She is scheduled to have her right knee operated on this upcoming Wednesday for a TKA  She reports excellent compliance with her CPAP device  States she uses it every night  She uses it every night thorough out the duration of her sleep  She even brings it to hospitals when she is admitted   She does not know her settings off hand       Review of Systems      Genitourinary none   Cardiology none   Gastrointestinal frequent heartburn/acid reflux   Neurology frequent headaches, numbness/tingling of an extremity, forgetfulness and difficulty with memory   Constitutional fatigue   Integumentary none   Psychiatry anxiety and depression   Musculoskeletal joint pain   Pulmonary snoring and difficulty breathing when lying flat    ENT none   Endocrine none   Hematological none               Historical Information   Past Medical History:   Diagnosis Date    Anxiety     Arthritis     oa cassandra knees    Asthma     good control- no medications    Yan's esophagus     Bipolar affective disorder (HCC)     Chronic pain disorder     lumbar    CPAP (continuous positive airway pressure) dependence     Degenerative disc disease at L5-S1 level     Deliberate self-cutting     Depression 09/16/2008    Disease of thyroid gland     hypo    MARTINEZ (dyspnea on exertion)     Drug overdose 10/28/2008    suicide attempt    Dysphagia     Dyspnea     Edema     BLE    GERD (gastroesophageal reflux disease)     High blood pressure     History of COVID-19 12/30/2020    Symptoms started on 12/30/2020 and then got worse  Today she is feeling a little bit better    She is a candidate for monoclonal antibody infusion and set for 1/6/21 @ 1pm at [facility]  01/07/21 - telemedicine -     Hypertension     Knee pain, bilateral     Especially right    Migraines     Obese     Overactive bladder     Sjogren's disease ([facility] [address] )     Sleep apnea     Stress incontinence     Suicidal ideations     Umbilical hernia     surgical repair today 4/24/2019    Use of cane as ambulatory aid     awaiting b/l knee replacement    Wears glasses      Past Surgical History:   Procedure Laterality Date    BREAST BIOPSY Right 1989    benign    CARPAL TUNNEL RELEASE Left     CHOLECYSTECTOMY  05/2003    Laparoscopic    COLONOSCOPY      01/12/2009    DILATION AND CURETTAGE OF UTERUS      ELBOW SURGERY Left     x2  No hardware    ESOPHAGOGASTRODUODENOSCOPY      FOOT SURGERY Left     Plantar fasciotomy    HYSTERECTOMY      laporoscopic, partial    KNEE ARTHROSCOPY Bilateral     OOPHORECTOMY Left 10/2015    HI ANAL SPHINCTEROTOMY N/A 2018    Procedure: EUA, LEFT PARTIAL INTERNAL SPHINCTEROTOMY;  Surgeon: Daily Grace MD;  Location: [facility] MAIN OR;  Service: Colorectal    HI GASTROCNEMIUS RECESSION Left 2021    Procedure: RECESSION GASTROC OPEN;  Surgeon: Kevan Norwood DPM;  Location: [facility] MAIN OR;  Service: Podiatry    HI REPAIR UMBILICAL LSLH,0+K/Q,NEYGI N/A 2019    Procedure: REPAIR HERNIA UMBILICAL LAPAROSCOPIC W/ ROBOTICS;  Surgeon: Mike Jones MD;  Location: [facility];  Service: General    REDUCTION MAMMAPLASTY Bilateral 1999    REPAIR LACERATION Left     left hand-2009    ROTATOR CUFF REPAIR Left     TONSILECTOMY AND ADNOIDECTOMY      TONSILLECTOMY      US GUIDED MSK PROCEDURE  2021    VEIN LIGATION Bilateral     WISDOM TOOTH EXTRACTION       Family History   Problem Relation Age of Onset    Kidney cancer Mother     Diabetes Mother     Colon cancer Family     No Known Problems Father     No Known Problems Sister     Colon cancer Paternal Uncle     Colon cancer Maternal Uncle     Colon cancer Maternal Uncle      Social History     Socioeconomic History    Marital status: Single     Spouse name: Not on file    Number of children: Not on file    Years of education: Not on file    Highest education level: Not on file   Occupational History    Not on file   Tobacco Use    Smoking status: Former Smoker     Packs/day: 3 00     Types: Cigarettes     Quit date: 2018     Years since quittin 1    Smokeless tobacco: Never Used    Tobacco comment: Started at age 14     Vaping Use    Vaping Use: Some days    Substances: THC   Substance and Sexual Activity    Alcohol use: Not Currently     Comment: Recovering alcoholic    Drug use: Yes     Types: Marijuana, Methamphetamines     Comment: medical- vapes 3 times per wk at hs    Sexual activity: Not Currently   Other Topics Concern    Not on file   Social History Narrative    Not on file     Social Determinants of Health     Financial Resource Strain: Not on file   Food Insecurity: Not on file   Transportation Needs: Not on file   Physical Activity: Not on file   Stress: Not on file   Social Connections: Not on file   Intimate Partner Violence: Not on file   Housing Stability: Not on file       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 Authorizing Provider   amLODIPine (NORVASC) 5 mg tablet Take 1 tablet (5 mg total) by mouth daily 1/19/22  Yes JHONATAN Romano   Cholecalciferol (Vitamin D3) 125 MCG (5000 UT) TABS Take 5,000 Units by mouth daily   Yes Historical Provider, MD   divalproex sodium (DEPAKOTE ER) 500 mg 24 hr tablet Take 1,000 mg by mouth daily   Yes Historical Provider, MD   estradiol (ESTRACE) 1 mg tablet Take 1 tablet (1 mg total) by mouth daily  Patient taking differently: Take 0 5 mg by mouth daily   1/18/22  Yes Mandi Can DO   Fesoterodine Fumarate ER (Toviaz) 4 MG TB24 Take 1 tablet by mouth daily   Yes Historical Provider, MD   fluticasone (FLOVENT HFA) 110 MCG/ACT inhaler Inhale 2 puffs 2 (two) times a day Rinse mouth after use     Yes Historical Provider, MD   haloperidol decanoate (Haldol Decanoate) 50 mg/mL injection Inject 50 mg into a muscle every 30 (thirty) days 11/4/21  Yes Historical Provider, MD   pilocarpine (SALAGEN) 5 mg tablet Take 5 mg by mouth 3 (three) times a day   Yes Historical Provider, MD   RABEprazole (Aciphex) 20 MG tablet Take 20 mg by mouth daily   Yes Historical Provider, MD   Toviaz 8 MG TB24 TAKE 1 TABLET (8 MG TOTAL) BY MOUTH DAILY  Patient taking differently: Take 4 mg by mouth daily   12/14/21  Yes Renaye Mcburney, CRNP   venlafaxine [facility] ) 75 mg 24 hr capsule Take 1 capsule by mouth in the morning 1/26/22  Yes Historical Provider, MD   buPROPion (WELLBUTRIN XL) 300 mg 24 hr tablet Take 1 tablet (300 mg total) by mouth daily  Patient taking differently: Take 450 mg by mouth daily   1/10/22 2/10/22  Matt Alvarado PA-C   colestipol (COLESTID) 1 g tablet Take 1 tablet (1 g total) by mouth 2 (two) times a day 1/10/22 2/10/22  Dimple Keep, PA-C   divalproex sodium (DEPAKOTE ER) 250 mg 24 hr tablet Take 3 tablets (750 mg total) by mouth daily  Patient taking differently: Take 1,000 mg by mouth daily   1/10/22 2/9/22  Dimple Keep, PA-C   furosemide (LASIX) 20 mg tablet Take 1 tablet (20 mg total) by mouth daily 1/10/22 2/10/22  Dimple Keep, PA-C   levothyroxine 75 mcg tablet Take 1 tablet (75 mcg total) by mouth daily in the early morning 1/10/22 2/10/22  Dimple Keep, PA-C   losartan (COZAAR) 50 mg tablet Take 1 tablet (50 mg total) by mouth daily 1/10/22 2/10/22  Dimple Keep, PA-C   lurasidone 60 MG TABS Take 1 tablet (60 mg total) by mouth daily with dinner 1/10/22 2/10/22  Dimple Keep, PA-LUIS   metoprolol tartrate (LOPRESSOR) 25 mg tablet Take 1 tablet (25 mg total) by mouth every 12 (twelve) hours 1/10/22 2/10/22  Matt Alvarado PA-C   topiramate (TOPAMAX) 100 mg tablet Take 1 tablet (100 mg total) by mouth 2 (two) times a day 1/10/22 2/10/22  Dimple Keep, PA-C       Vitals:   Blood pressure 136/64, pulse 66, height 5' 6" (1 676 m), weight 126 kg (277 lb), not currently breastfeeding , RA, Body mass index is 44 71 kg/m²    Neck Circumference: 15 5    Physical Exam  General: pleasant, Awake alert and oriented x 3, conversant without conversational dyspnea, NAD, normal affect  HEENT:  PERRL, Sclera noninjected, nonicteric OU, Nares patent,  no craniofacial abnormalities, Mucous membranes, moist, no oral lesions, normal dentition, Mallampati class 2  NECK: large, Trachea midline, no accessory muscle use, no stridor, no cervical or supraclavicular adenopathy, JVP not elevated  CARDIAC: Reg, single s1/S2, no m/r/g  PULM: CTA bilaterally no wheezing, rhonchi or rales  ABD: Normoactive bowel sounds, soft nontender, nondistended, no rebound, no rigidity, no guarding  EXT: No cyanosis, no clubbing, no edema, normal capillary refill  NEURO: no focal neurologic deficits, AAOx3, moving all extremities appropriately    Labs: I have personally reviewed pertinent lab results  Lab Results   Component Value Date    WBC 9 92 02/01/2022    HGB 14 5 02/01/2022    HCT 45 7 02/01/2022    MCV 93 02/01/2022    PLT  02/01/2022      Comment:      Not reported due to platelet clumping        Lab Results   Component Value Date    CALCIUM 9 1 02/01/2022    K 4 1 02/01/2022    CO2 28 02/01/2022     02/01/2022    BUN 13 02/01/2022    CREATININE 0 74 02/01/2022     No results found for: IRON, TIBC, FERRITIN  Lab Results   Component Value Date    PHEKRGTN76 662 12/22/2021     Lab Results   Component Value Date    FOLATE >20 0 (H) 12/22/2021         Arterial Blood Gas result: none                                                          Micah Singletary DO

## 2022-02-21 NOTE — PROGRESS NOTES
Review of Systems      Genitourinary need to urinate more than twice a night   Cardiology ankle/leg swelling   Gastrointestinal frequent heartburn/acid reflux   Neurology frequent headaches, need to move extremities, forgetfulness, poor concentration or confusion, , difficulty with memory and balance problems   Constitutional fatigue   Integumentary none   Psychiatry anxiety, depression, aggressiveness or irritability and mood change   Musculoskeletal muscle aches, back pain and legs twitching/jerking   Pulmonary shortness of breath with activity and difficulty breathing when lying flat    ENT none   Endocrine none   Hematological none

## 2022-02-21 NOTE — LETTER
February 21, 2022     [facility], [facility] [address] [place] [address]    Patient: Blair Barboza   YOB: 1970   Date of Visit: 2/21/2022       Dear Dr Houser: Thank you for referring Ramez Ag to me for evaluation  Below are my notes for this consultation  If you have questions, please do not hesitate to call me  I look forward to following your patient along with you  Sincerely,        Nadia Mendez DO        CC: Amanda Akers, [address] Jacqueline Escobedo DO  2/21/2022  3:49 PM  Attested  Sleep Consultation   Blair Barboza 49 y o  female MRN: [id number]      Reason for consultation: MAG (obstructive sleep apnea)     Requesting physician: Willis Acosta PA-C    Assessment/Plan  #MAG  #COPD  #Substance abuse  #MDD    She was previously diagnosed with sleep apnea in 2018 via sleep study at [facility]  Her AHI at the time was 74 2 indicating severe sleep apnea  Per documentation "[facility] on 4/23/18 showed AHI of 74 2 events per hour (supine 74 2 and REM 88 5 events per hour) with oxygen woody of 58%  CPAP 18-20 cm H20 was effective  She is currently utilizing CPAP machine at a pressure of 10-20 cm H20; 90% pressure 14 9 cm H20  She uses full face mask  Patient using machine for an average of 8 25 hours per night with an AHI of 1 0 events per hour over the last month  She is currently utilizing DME [employer] and is receiving regular supplies " She has not followed up with their office since that time  She reports daily compliance with her CPAP device however, continues to report sleepiness through out the day despite use of her CPAP  At this time we will order a CPAP titration study with EtCO2 monitoring to asses if her sleepiness may have a component of hypercapnia given her extensive smoking history  She is in tobacco use remission which she was congratulated and encouraged to continue   She did report illicit substance used and was encouraged to discontinue it's use  Additionally, she reported recent upsetting family issues with the passing of her mother  She has been diagnosed with MDD and previously had suicide attempts  Depression could be playing a role in her sleepiness and anhedonia as well  She has a  and has multiple admissions for behavioral health  Will need psychiatric evaluation and potential adjustment to antidepressant regimen but will defer to PCP  History of Present Illness   HPI:  Rajan Vance is a 49 y o  female with PMHx as below who comes in for evaluation of obstructive sleep apnea  Patient notes 2 lb weight gain and symptoms of difficulty staying asleep she awakens 2-3 times per night, possible snoring, excessive daytime sleepiness,  awakenings with dry mouth but she states her mouth is always dry due to Sjogren's   she note symptoms of restless legs  she denies symptoms of cataplexy, sleep paralysis, hypnopompic or hypnagogic hallucinations  Sleep History:  she goes to bed at approximately 7-8PM, will get to sleep in a couple of min, will get out of bed at 5:30-7/8 AM   she will get up 2-3 times at night due to her elbows or her cat awakening her  It will then take a couple minutes to fall back asleep    she does nap during he day  The naps are 2 hours in duration and are not refreshing  Currently the patient is using medical marijuana to help them fall asleep occasionally  Previously used trazadone but does not use it anymore  She does take 1000 mg of depakote  Excessively sleepy during the day  She states she has been falling asleep easily while watching TV or doing other mundane tasks  She states she has had some difficulty with getting out of bed  She states she's been feeling sad since her mother passed  She was hospitalized up to 3 times for depression and suicide attempts  She was most recently discharged a little over a month ago  She was admitted at [facility]   She has a history of headaches but they have not been bothering her lately  She reports a history of illicit drug use  Last used methamphetamine 8 days ago  Since it's use she has been excessively tired throughout the day  She is scheduled to have her right knee operated on this upcoming Wednesday for a TKA  She reports excellent compliance with her CPAP device  States she uses it every night  She uses it every night thorough out the duration of her sleep  She even brings it to hospitals when she is admitted  She does not know her settings off hand  Review of Systems      Genitourinary none   Cardiology none   Gastrointestinal frequent heartburn/acid reflux   Neurology frequent headaches, numbness/tingling of an extremity, forgetfulness and difficulty with memory   Constitutional fatigue   Integumentary none   Psychiatry anxiety and depression   Musculoskeletal joint pain   Pulmonary snoring and difficulty breathing when lying flat    ENT none   Endocrine none   Hematological none               Historical Information   Past Medical History:   Diagnosis Date    Anxiety     Arthritis     oa cassandra knees    Asthma     good control- no medications    Yan's esophagus     Bipolar affective disorder (HCC)     Chronic pain disorder     lumbar    CPAP (continuous positive airway pressure) dependence     Degenerative disc disease at L5-S1 level     Deliberate self-cutting     Depression 09/16/2008    Disease of thyroid gland     hypo    MARTINEZ (dyspnea on exertion)     Drug overdose 10/28/2008    suicide attempt    Dysphagia     Dyspnea     Edema     BLE    GERD (gastroesophageal reflux disease)     High blood pressure     History of COVID-19 12/30/2020    Symptoms started on 12/30/2020 and then got worse  Today she is feeling a little bit better    She is a candidate for monoclonal antibody infusion and set for 1/6/21 @ 1pm at [facility]  01/07/21 - telemedicine -     Hypertension     Knee pain, bilateral     Especially right    Migraines     Obese     Overactive bladder     Sjogren's disease ([address] )     Sleep apnea     Stress incontinence     Suicidal ideations     Umbilical hernia     surgical repair today 4/24/2019    Use of cane as ambulatory aid     awaiting b/l knee replacement    Wears glasses      Past Surgical History:   Procedure Laterality Date    BREAST BIOPSY Right 1989    benign    CARPAL TUNNEL RELEASE Left     CHOLECYSTECTOMY  05/2003    Laparoscopic    COLONOSCOPY      01/12/2009    DILATION AND CURETTAGE OF UTERUS      ELBOW SURGERY Left     x2   No hardware    ESOPHAGOGASTRODUODENOSCOPY      FOOT SURGERY Left     Plantar fasciotomy    HYSTERECTOMY      laporoscopic, partial    KNEE ARTHROSCOPY Bilateral     OOPHORECTOMY Left 10/2015    AK ANAL SPHINCTEROTOMY N/A 8/31/2018    Procedure: EUA, LEFT PARTIAL INTERNAL SPHINCTEROTOMY;  Surgeon: Gino Cesar MD;  Location: [address] [place] [address] OR;  Service: Colorectal    AK GASTROCNEMIUS RECESSION Left 2/24/2021    Procedure: RECESSION GASTROC OPEN;  Surgeon: Trev Dorado DPM;  Location: [address] [place] [address] OR;  Service: Podiatry    AK REPAIR UMBILICAL ZMGT,7+F/O,WAUHZ N/A 4/24/2019    Procedure: REPAIR HERNIA UMBILICAL LAPAROSCOPIC W/ ROBOTICS;  Surgeon: Gina High MD;  [facility];  Service: General    REDUCTION MAMMAPLASTY Bilateral 1999    REPAIR LACERATION Left     left hand-5/18/2009    ROTATOR CUFF REPAIR Left     TONSILECTOMY AND ADNOIDECTOMY      TONSILLECTOMY      US GUIDED MSK PROCEDURE  4/22/2021    VEIN LIGATION Bilateral     WISDOM TOOTH EXTRACTION       Family History   Problem Relation Age of Onset    Kidney cancer Mother     Diabetes Mother     Colon cancer Family     No Known Problems Father     No Known Problems Sister     Colon cancer Paternal Uncle     Colon cancer Maternal Uncle     Colon cancer Maternal Uncle      Social History     Socioeconomic History    Marital status: Single     Spouse name: Not on file    Number of children: Not on file    Years of education: Not on file    Highest education level: Not on file   Occupational History    Not on file   Tobacco Use    Smoking status: Former Smoker     Packs/day: 3 00     Types: Cigarettes     Quit date: 2018     Years since quittin 1    Smokeless tobacco: Never Used    Tobacco comment: Started at age 14  Vaping Use    Vaping Use: Some days    Substances: THC   Substance and Sexual Activity    Alcohol use: Not Currently     Comment: Recovering alcoholic    Drug use: Yes     Types: Marijuana, Methamphetamines     Comment: medical- vapes 3 times per wk at hs    Sexual activity: Not Currently   Other Topics Concern    Not on file   Social History Narrative    Not on file     Social Determinants of Health     Financial Resource Strain: Not on file   Food Insecurity: Not on file   Transportation Needs: Not on file   Physical Activity: Not on file   Stress: Not on file   Social Connections: Not on file   Intimate Partner Violence: Not on file   Housing Stability: Not on file       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  Authorizing Provider   amLODIPine (NORVASC) 5 mg tablet Take 1 tablet (5 mg total) by mouth daily 22  Yes JHONATAN Morales   Cholecalciferol (Vitamin D3) 125 MCG (5000 UT) TABS Take 5,000 Units by mouth daily   Yes Historical Provider, MD   divalproex sodium (DEPAKOTE ER) 500 mg 24 hr tablet Take 1,000 mg by mouth daily   Yes Historical Provider, MD   estradiol (ESTRACE) 1 mg tablet Take 1 tablet (1 mg total) by mouth daily  Patient taking differently: Take 0 5 mg by mouth daily   1/18/22  Yes Fiona Apontes, DO   Fesoterodine Fumarate ER (Toviaz) 4 MG TB24 Take 1 tablet by mouth daily   Yes Historical Provider, MD   fluticasone (FLOVENT HFA) 110 MCG/ACT inhaler Inhale 2 puffs 2 (two) times a day Rinse mouth after use     Yes Historical Provider, MD   haloperidol decanoate (Haldol Decanoate) 50 mg/mL injection Inject 50 mg into a muscle every 30 (thirty) days 11/4/21  Yes Historical Provider, MD   pilocarpine (SALAGEN) 5 mg tablet Take 5 mg by mouth 3 (three) times a day   Yes Historical Provider, MD   RABEprazole (Aciphex) 20 MG tablet Take 20 mg by mouth daily   Yes Historical Provider, MD   Toviaz 8 MG TB24 TAKE 1 TABLET (8 MG TOTAL) BY MOUTH DAILY  Patient taking differently: Take 4 mg by mouth daily   12/14/21  Yes JHONATAN Gonzales   venlafaxine [facility] ) 75 mg 24 hr capsule Take 1 capsule by mouth in the morning 1/26/22  Yes Historical Provider, MD   buPROPion (WELLBUTRIN XL) 300 mg 24 hr tablet Take 1 tablet (300 mg total) by mouth daily  Patient taking differently: Take 450 mg by mouth daily   1/10/22 2/10/22  Celia Alvarado PA-C   colestipol (COLESTID) 1 g tablet Take 1 tablet (1 g total) by mouth 2 (two) times a day 1/10/22 2/10/22  Britton Lai PA-C   divalproex sodium (DEPAKOTE ER) 250 mg 24 hr tablet Take 3 tablets (750 mg total) by mouth daily  Patient taking differently: Take 1,000 mg by mouth daily   1/10/22 2/9/22  Britton Lai PA-C   furosemide (LASIX) 20 mg tablet Take 1 tablet (20 mg total) by mouth daily 1/10/22 2/10/22  Britton Lai PA-C   levothyroxine 75 mcg tablet Take 1 tablet (75 mcg total) by mouth daily in the early morning 1/10/22 2/10/22  Celia Alvarado PA-C   losartan (COZAAR) 50 mg tablet Take 1 tablet (50 mg total) by mouth daily 1/10/22 2/10/22  Britton Lai PA-C   lurasidone 60 MG TABS Take 1 tablet (60 mg total) by mouth daily with dinner 1/10/22 2/10/22  Marianna Jak Gallardo PA-C   metoprolol tartrate (LOPRESSOR) 25 mg tablet Take 1 tablet (25 mg total) by mouth every 12 (twelve) hours 1/10/22 2/10/22  Prosper Alvarado PA-C   topiramate (TOPAMAX) 100 mg tablet Take 1 tablet (100 mg total) by mouth 2 (two) times a day 1/10/22 2/10/22  Nikky Bernal PA-C       Vitals:   Blood pressure 136/64, pulse 66, height 5' 6" (1 676 m), weight 126 kg (277 lb), not currently breastfeeding , RA, Body mass index is 44 71 kg/m²  Neck Circumference: 15 5    Physical Exam  General: pleasant, Awake alert and oriented x 3, conversant without conversational dyspnea, NAD, normal affect  HEENT:  PERRL, Sclera noninjected, nonicteric OU, Nares patent,  no craniofacial abnormalities, Mucous membranes, moist, no oral lesions, normal dentition, Mallampati class 2  NECK: large, Trachea midline, no accessory muscle use, no stridor, no cervical or supraclavicular adenopathy, JVP not elevated  CARDIAC: Reg, single s1/S2, no m/r/g  PULM: CTA bilaterally no wheezing, rhonchi or rales  ABD: Normoactive bowel sounds, soft nontender, nondistended, no rebound, no rigidity, no guarding  EXT: No cyanosis, no clubbing, no edema, normal capillary refill  NEURO: no focal neurologic deficits, AAOx3, moving all extremities appropriately    Labs: I have personally reviewed pertinent lab results  Lab Results   Component Value Date    WBC 9 92 02/01/2022    HGB 14 5 02/01/2022    HCT 45 7 02/01/2022    MCV 93 02/01/2022    PLT  02/01/2022      Comment:      Not reported due to platelet clumping        Lab Results   Component Value Date    CALCIUM 9 1 02/01/2022    K 4 1 02/01/2022    CO2 28 02/01/2022     02/01/2022    BUN 13 02/01/2022    CREATININE 0 74 02/01/2022     No results found for: IRON, TIBC, FERRITIN  Lab Results   Component Value Date    BXIUSFQL26 982 12/22/2021     Lab Results   Component Value Date    FOLATE >20 0 (H) 12/22/2021         Arterial Blood Gas result: none Gilbert Hopkins DO      Attestation signed by Miranda Hairston DO at 2/21/2022  4:20 PM:  I have personally seen and examined  I discussed the patient with the  fellow including, but not limited to, verifying findings; reviewing labs and x-rays;developing the plan of care with patient  I have reviewed the note and assessment performed by the fellow and agree with the fellow's documented findings and plan of care with the following additions  Please see my following comments for details and adjustments  Assessment/Plan  49 y o  F with PMHx of Sjogren's disease, HTN, GERD, Bipolar disease, Asthma, Yan's esophagus, COPD, who comes in for evaluation of snoring and excessive daytime sleepiness  1   Severe MAG (AHI - 74 2) - on autoPAP 10-20  Compliance data not available (CHASE [facility]ala)  She still notes excessive daytime sleepiness  -  Will check a CPAP titration study with TCO2 monitoring  Low threshold to switch to BIPAP  -  I also discussed in depth the risk of leaving sleep apnea untreated including hypertension, heart failure, arrhythmia, MI and stroke  -  She may have a component of nocturnal hypoxia which may be causing her nocturnal interruptions  Will evaluate for that as well  HPI:  Yung Angeles is a 49 y o  female with PMHx as below who comes in for evaluation of MAG  She was diagnosed with severe MAG in 2018 at [facility]  She has been on CPAP and has noted improvement with CPAP  However, she still has daytime sleepiness with [place] of 14  Patient notes weight gain, symptoms of difficulty falling asleep, difficulty staying asleep, snoring, awakenings with gasping, witnessed apneas, morning headaches, and awakenings with dry mouth  she note symptoms of restless legs  she denies symptoms of cataplexy, sleep paralysis, hypnopompic or hypnagogic hallucinations           Sleep History:  she goes to bed at approximately 8, will get to sleep in a few min, will get out of bed at 7 am   she will get up 1-2 times at night for unknown reason  It will then take a few minutes to fall back asleep    she does not nap during the dya  Vitals:    02/21/22 1413   BP: 136/64   Pulse: 66   RA  General:  Patient is awake, alert, non-toxic and in no acute respiratory distress  Eyes: PERRL, no scleral icterus  Neck: No JVD  CV:  Regular, +S1 and S2, No murmurs, gallops or rubs appreciated  Lungs: Clear to auscultation bilateral without wheeze, rales or rhonci  Abdomen: Soft, +BS, Non-tender, non-distended  Extremities: No clubbing, cyanosis or edema  Neuro: No focal motor/sensory deficits  Lab Results   Component Value Date    WBC 9 92 02/01/2022    HGB 14 5 02/01/2022    HCT 45 7 02/01/2022    MCV 93 02/01/2022    PLT  02/01/2022      Comment:      Not reported due to platelet clumping  Lab Results   Component Value Date    SODIUM 140 02/01/2022    K 4 1 02/01/2022     02/01/2022    CO2 28 02/01/2022    BUN 13 02/01/2022    CREATININE 0 74 02/01/2022    GLUC 90 01/06/2022    CALCIUM 9 1 02/01/2022      [facility]  2018 via sleep study at [facility]  Her AHI at the time was 74 2 indicating severe sleep apnea  Per documentation "[facility] on 4/23/18 showed AHI of 74 2 events per hour (supine 74 2 and REM 88 5 events per hour) with oxygen woody of 58%  CPAP 18-20 cm H20 was effective  She is currently utilizing CPAP machine at a pressure of 10-20 cm H20; 90% pressure 14 9 cm H20  She uses full face mask  Patient using machine for an average of 8 25 hours per night with an AHI of 1 0 events per hour over the last month

## 2022-02-21 NOTE — LETTER
February 21, 2022     Patient: Cass Andujar   YOB: 1970   Date of Visit: 2/21/2022       To Whom it May Concern:    Melony Carmichael is under my professional care  She was seen in my office on 2/21/2022  She may return to work on 2/22/22  If you have any questions or concerns, please don't hesitate to call  Sincerely,          Sean Faria DO        CC: [facility]  Rachel Parra

## 2022-02-21 NOTE — ASSESSMENT & PLAN NOTE
Recent use of methamphetamine could be contributing to her current symptoms  Recommend complete cessation of all illicit substances  Recommend discontinuation of meth and marijuana

## 2022-02-22 ENCOUNTER — APPOINTMENT (OUTPATIENT)
Dept: LAB | Facility: CLINIC | Age: 52
End: 2022-02-22
Payer: MEDICARE

## 2022-02-22 DIAGNOSIS — Z01.818 ENCOUNTER FOR PREADMISSION TESTING: ICD-10-CM

## 2022-02-22 DIAGNOSIS — Z51.81 ENCOUNTER FOR THERAPEUTIC DRUG MONITORING: ICD-10-CM

## 2022-02-22 LAB
ALBUMIN SERPL BCP-MCNC: 3.3 G/DL (ref 3.5–5)
ALP SERPL-CCNC: 62 U/L (ref 46–116)
ALT SERPL W P-5'-P-CCNC: 16 U/L (ref 12–78)
ANION GAP SERPL CALCULATED.3IONS-SCNC: 5 MMOL/L (ref 4–13)
AST SERPL W P-5'-P-CCNC: 9 U/L (ref 5–45)
BILIRUB SERPL-MCNC: 0.21 MG/DL (ref 0.2–1)
BUN SERPL-MCNC: 17 MG/DL (ref 5–25)
CALCIUM ALBUM COR SERPL-MCNC: 9.8 MG/DL (ref 8.3–10.1)
CALCIUM SERPL-MCNC: 9.2 MG/DL (ref 8.3–10.1)
CHLORIDE SERPL-SCNC: 110 MMOL/L (ref 100–108)
CHOLEST SERPL-MCNC: 271 MG/DL
CO2 SERPL-SCNC: 26 MMOL/L (ref 21–32)
CREAT SERPL-MCNC: 0.64 MG/DL (ref 0.6–1.3)
EST. AVERAGE GLUCOSE BLD GHB EST-MCNC: 103 MG/DL
GFR SERPL CREATININE-BSD FRML MDRD: 104 ML/MIN/1.73SQ M
GLUCOSE P FAST SERPL-MCNC: 67 MG/DL (ref 65–99)
HBA1C MFR BLD: 5.2 %
HDLC SERPL-MCNC: 62 MG/DL
LDLC SERPL CALC-MCNC: 169 MG/DL (ref 0–100)
NONHDLC SERPL-MCNC: 209 MG/DL
POTASSIUM SERPL-SCNC: 4.1 MMOL/L (ref 3.5–5.3)
PROT SERPL-MCNC: 7.1 G/DL (ref 6.4–8.2)
SODIUM SERPL-SCNC: 141 MMOL/L (ref 136–145)
TRIGL SERPL-MCNC: 198 MG/DL
TSH SERPL DL<=0.05 MIU/L-ACNC: 6.85 UIU/ML (ref 0.36–3.74)
VALPROATE SERPL-MCNC: 57 UG/ML (ref 50–100)

## 2022-02-22 PROCEDURE — 80164 ASSAY DIPROPYLACETIC ACD TOT: CPT

## 2022-02-22 PROCEDURE — 83036 HEMOGLOBIN GLYCOSYLATED A1C: CPT

## 2022-02-22 PROCEDURE — 80053 COMPREHEN METABOLIC PANEL: CPT

## 2022-02-22 PROCEDURE — 36415 COLL VENOUS BLD VENIPUNCTURE: CPT

## 2022-02-22 PROCEDURE — 85025 COMPLETE CBC W/AUTO DIFF WBC: CPT

## 2022-02-22 PROCEDURE — 80061 LIPID PANEL: CPT

## 2022-02-22 PROCEDURE — 84443 ASSAY THYROID STIM HORMONE: CPT

## 2022-02-23 LAB
BASOPHILS # BLD AUTO: 0.05 THOUSANDS/ΜL (ref 0–0.1)
BASOPHILS NFR BLD AUTO: 1 % (ref 0–1)
EOSINOPHIL # BLD AUTO: 0.06 THOUSAND/ΜL (ref 0–0.61)
EOSINOPHIL NFR BLD AUTO: 1 % (ref 0–6)
ERYTHROCYTE [DISTWIDTH] IN BLOOD BY AUTOMATED COUNT: 14.2 % (ref 11.6–15.1)
HCT VFR BLD AUTO: 45.9 % (ref 34.8–46.1)
HGB BLD-MCNC: 14.3 G/DL (ref 11.5–15.4)
IMM GRANULOCYTES # BLD AUTO: 0.17 THOUSAND/UL (ref 0–0.2)
IMM GRANULOCYTES NFR BLD AUTO: 2 % (ref 0–2)
LYMPHOCYTES # BLD AUTO: 2.92 THOUSANDS/ΜL (ref 0.6–4.47)
LYMPHOCYTES NFR BLD AUTO: 29 % (ref 14–44)
MCH RBC QN AUTO: 30.5 PG (ref 26.8–34.3)
MCHC RBC AUTO-ENTMCNC: 31.2 G/DL (ref 31.4–37.4)
MCV RBC AUTO: 98 FL (ref 82–98)
MONOCYTES # BLD AUTO: 1.02 THOUSAND/ΜL (ref 0.17–1.22)
MONOCYTES NFR BLD AUTO: 10 % (ref 4–12)
NEUTROPHILS # BLD AUTO: 5.71 THOUSANDS/ΜL (ref 1.85–7.62)
NEUTS SEG NFR BLD AUTO: 57 % (ref 43–75)
NRBC BLD AUTO-RTO: 0 /100 WBCS
PMV BLD AUTO: 11.9 FL (ref 8.9–12.7)
RBC # BLD AUTO: 4.69 MILLION/UL (ref 3.81–5.12)
WBC # BLD AUTO: 9.93 THOUSAND/UL (ref 4.31–10.16)

## 2022-02-25 ENCOUNTER — TELEPHONE (OUTPATIENT)
Dept: FAMILY MEDICINE CLINIC | Facility: CLINIC | Age: 52
End: 2022-02-25

## 2022-02-25 NOTE — TELEPHONE ENCOUNTER
I can place a urine at the lab   she can go give the specimen and we can treat by what that shows   cont to monitor for fevers

## 2022-02-25 NOTE — TELEPHONE ENCOUNTER
Patient called stating that she had a surgery on 2/23  Doing good but patient started to experience urinary frequency this morning, denies any pain or burning  Denies any fever or chills  Patient called the surgeon's office and she was given advised to call our office   Is it ok to do virtual  Please advise

## 2022-02-28 ENCOUNTER — TELEPHONE (OUTPATIENT)
Dept: FAMILY MEDICINE CLINIC | Facility: CLINIC | Age: 52
End: 2022-02-28

## 2022-02-28 NOTE — TELEPHONE ENCOUNTER
Patient called wanted to let you know she does not have the urine issues anymore  She states the reason this was happening was because of the surgery  She is a lot better now   CANELOI

## 2022-03-03 ENCOUNTER — TELEMEDICINE (OUTPATIENT)
Dept: FAMILY MEDICINE CLINIC | Facility: CLINIC | Age: 52
End: 2022-03-03
Payer: MEDICARE

## 2022-03-03 DIAGNOSIS — Z79.899 ENCOUNTER FOR MEDICATION REVIEW: ICD-10-CM

## 2022-03-03 DIAGNOSIS — R21 RASH: Primary | ICD-10-CM

## 2022-03-03 PROBLEM — M25.561 KNEE PAIN, RIGHT: Status: ACTIVE | Noted: 2022-02-23

## 2022-03-03 PROCEDURE — 99213 OFFICE O/P EST LOW 20 MIN: CPT | Performed by: NURSE PRACTITIONER

## 2022-03-03 RX ORDER — BUPROPION HYDROCHLORIDE 300 MG/1
300 TABLET ORAL DAILY
COMMUNITY
End: 2022-03-03 | Stop reason: ALTCHOICE

## 2022-03-03 RX ORDER — ACETAMINOPHEN 500 MG
1000 TABLET ORAL 3 TIMES DAILY
COMMUNITY
Start: 2022-02-23 | End: 2022-03-25

## 2022-03-03 RX ORDER — SENNA PLUS 8.6 MG/1
2 TABLET ORAL DAILY
COMMUNITY
Start: 2022-02-25 | End: 2022-03-28

## 2022-03-03 RX ORDER — CELECOXIB 200 MG/1
200 CAPSULE ORAL 2 TIMES DAILY
COMMUNITY
Start: 2022-02-24 | End: 2022-03-28

## 2022-03-03 RX ORDER — BUPROPION HYDROCHLORIDE 150 MG/1
150 TABLET ORAL DAILY
COMMUNITY
End: 2022-07-15

## 2022-03-03 RX ORDER — RABEPRAZOLE SODIUM 20 MG/1
20 TABLET, DELAYED RELEASE ORAL 2 TIMES DAILY
COMMUNITY

## 2022-03-03 RX ORDER — OXYCODONE HYDROCHLORIDE 5 MG/1
1 TABLET ORAL EVERY 4 HOURS PRN
COMMUNITY
Start: 2022-02-23 | End: 2022-03-28

## 2022-03-03 RX ORDER — CETIRIZINE HYDROCHLORIDE 10 MG/1
10 TABLET ORAL DAILY
Qty: 30 TABLET | Refills: 1 | Status: SHIPPED | OUTPATIENT
Start: 2022-03-03 | End: 2022-03-28

## 2022-03-03 NOTE — PROGRESS NOTES
Virtual Regular Visit    Verification of patient location:    Patient is located in the following state in which I hold an active license [place]      Assessment/Plan:    Problem List Items Addressed This Visit     None      Visit Diagnoses     Rash    -  Primary    reaction she thinks to chloraprep      will take zyrtec along with the benadryl 50 mg prn on benadryl     Relevant Medications    cetirizine (ZyrTEC) 10 mg tablet    Encounter for medication review          had recent knee surgery    Has in home PT/OT          Reason for visit is   Chief Complaint   Patient presents with    Discuss medication    Urinary Frequency    Rash    Virtual Regular Visit        Encounter provider JHONATAN Liang    Provider located at [facility] AT [address] [address]      Recent Visits  Date Type Provider Dept   02/28/22 Telephone Guzman Singh, [address] [facility]    02/25/22 Telephone Bobby Monae, [address] recent visits within past 7 days and meeting all other requirements  Today's Visits  Date Type Provider Dept   03/03/22 [address] [place] [address], [address] [place] [address]   Showing today's visits and meeting all other requirements  Future Appointments  No visits were found meeting these conditions  Showing future appointments within next 150 days and meeting all other requirements       The patient was identified by name and date of birth  Pascaleamanda Baxter was informed that this is a telemedicine visit and that the visit is being conducted through [address] [place] [address] Now and patient was informed that this is a secure, HIPAA-compliant platform  She agrees to proceed     My office door was closed  No one else was in the room  She acknowledged consent and understanding of privacy and security of the video platform   The patient has agreed to participate and understands they can discontinue the visit at any time  Patient is aware this is a billable service  Subjective  Donalda Phoenix is a 49 y o  female rash around incision    Allergic to what preped with    Very itchy    Tried 25 mg of benadryl  Didn't help  Has happened in past      Had med review         HPI     Past Medical History:   Diagnosis Date    Anxiety     Arthritis     oa cassandra knees    Asthma     good control- no medications    Yan's esophagus     Bipolar affective disorder (HCC)     Chronic pain disorder     lumbar    CPAP (continuous positive airway pressure) dependence     Degenerative disc disease at L5-S1 level     Deliberate self-cutting     Depression 09/16/2008    Disease of thyroid gland     hypo    MARTINEZ (dyspnea on exertion)     Drug overdose 10/28/2008    suicide attempt    Dysphagia     Dyspnea     Edema     BLE    GERD (gastroesophageal reflux disease)     High blood pressure     History of COVID-19 12/30/2020    Symptoms started on 12/30/2020 and then got worse  Today she is feeling a little bit better  She is a candidate for monoclonal antibody infusion and set for 1/6/21 @ 1pm at [facility]  01/07/21 - telemedicine -     Hypertension     Knee pain, bilateral     Especially right    Migraines     Obese     Overactive bladder     Sjogren's disease ([address] )     Sleep apnea     Stress incontinence     Suicidal ideations     Umbilical hernia     surgical repair today 4/24/2019    Use of cane as ambulatory aid     awaiting b/l knee replacement    Wears glasses        Past Surgical History:   Procedure Laterality Date    BREAST BIOPSY Right 1989    benign    CARPAL TUNNEL RELEASE Left     CHOLECYSTECTOMY  05/2003    Laparoscopic    COLONOSCOPY      01/12/2009    DILATION AND CURETTAGE OF UTERUS      ELBOW SURGERY Left     x2   No hardware    ESOPHAGOGASTRODUODENOSCOPY      FOOT SURGERY Left     Plantar fasciotomy    HYSTERECTOMY      laporoscopic, partial    KNEE ARTHROSCOPY Bilateral     OOPHORECTOMY Left 10/2015    [place] ANAL SPHINCTEROTOMY N/A 8/31/2018    Procedure: EUA, LEFT PARTIAL INTERNAL SPHINCTEROTOMY;  Surgeon: Alicia Jaime MD;  Location: [address] [place] [address] MAIN OR;  Service: Colorectal    [place] GASTROCNEMIUS RECESSION Left 2/24/2021    Procedure: RECESSION GASTROC OPEN;  Surgeon: Edith Wood DPM;  Location: [address] [place] [address] MAIN OR;  [facility]: [facility] AOCI,5+A/P,NMKKW N/A 4/24/2019    Procedure: REPAIR HERNIA UMBILICAL LAPAROSCOPIC W/ ROBOTICS;  Surgeon: Marylou Randall MD;  Location: [facility] Main OR;  Service: General    REDUCTION MAMMAPLASTY Bilateral 1999    REPAIR LACERATION Left     left hand-5/18/2009    ROTATOR CUFF REPAIR Left     TONSILECTOMY AND ADNOIDECTOMY      TONSILLECTOMY      US GUIDED MSK PROCEDURE  4/22/2021    VEIN LIGATION Bilateral     WISDOM TOOTH EXTRACTION         Current Outpatient Medications   Medication Sig Dispense Refill    acetaminophen (TYLENOL) 500 mg tablet Take 1,000 mg by mouth Three times a day      amLODIPine (NORVASC) 5 mg tablet Take 1 tablet (5 mg total) by mouth daily 30 tablet 3    apixaban (ELIQUIS) 2 5 mg Take 2 5 mg by mouth 2 (two) times a day      buPROPion (WELLBUTRIN XL) 150 mg 24 hr tablet Take 150 mg by mouth daily      buPROPion (WELLBUTRIN XL) 300 mg 24 hr tablet Take 1 tablet (300 mg total) by mouth daily (Patient taking differently: Take 450 mg by mouth daily  ) 30 tablet 0    celecoxib (CeleBREX) 200 mg capsule Take 200 mg by mouth 2 (two) times a day      cetirizine (ZyrTEC) 10 mg tablet Take 1 tablet (10 mg total) by mouth daily 30 tablet 1    Cholecalciferol (Vitamin D3) 125 MCG (5000 UT) TABS Take 5,000 Units by mouth daily      colestipol (COLESTID) 1 g tablet Take 1 tablet (1 g total) by mouth 2 (two) times a day 60 tablet 0    divalproex sodium (DEPAKOTE ER) 250 mg 24 hr tablet Take 3 tablets (750 mg total) by mouth daily (Patient taking differently: Take 1,000 mg by mouth daily  ) 90 tablet 0    divalproex sodium (DEPAKOTE ER) 500 mg 24 hr tablet Take 1,000 mg by mouth daily      estradiol (ESTRACE) 1 mg tablet Take 1 tablet (1 mg total) by mouth daily (Patient taking differently: Take 0 5 mg by mouth daily  ) 30 tablet 3    Fesoterodine Fumarate ER (Toviaz) 4 MG TB24 Take 1 tablet by mouth daily      fluticasone (FLOVENT HFA) 110 MCG/ACT inhaler Inhale 2 puffs 2 (two) times a day Rinse mouth after use   furosemide (LASIX) 20 mg tablet Take 1 tablet (20 mg total) by mouth daily 30 tablet 0    haloperidol decanoate (Haldol Decanoate) 50 mg/mL injection Inject 50 mg into a muscle every 30 (thirty) days      levothyroxine 75 mcg tablet Take 1 tablet (75 mcg total) by mouth daily in the early morning 30 tablet 0    losartan (COZAAR) 50 mg tablet Take 1 tablet (50 mg total) by mouth daily 30 tablet 0    lurasidone 60 MG TABS Take 1 tablet (60 mg total) by mouth daily with dinner 30 tablet 0    metoprolol tartrate (LOPRESSOR) 25 mg tablet Take 1 tablet (25 mg total) by mouth every 12 (twelve) hours 60 tablet 0    oxyCODONE (ROXICODONE) 5 immediate release tablet Take 1 tablet by mouth every 4 (four) hours as needed      pilocarpine (SALAGEN) 5 mg tablet Take 5 mg by mouth 3 (three) times a day      RABEprazole (ACIPHEX) 20 MG tablet Take 20 mg by mouth 2 (two) times a day      senna (SENOKOT) 8 6 MG tablet Take 2 tablets by mouth daily      topiramate (TOPAMAX) 100 mg tablet Take 1 tablet (100 mg total) by mouth 2 (two) times a day 60 tablet 0    venlafaxine (EFFEXOR-XR) 75 mg 24 hr capsule Take 1 capsule by mouth in the morning       No current facility-administered medications for this visit  Allergies   Allergen Reactions    Percocet [Oxycodone-Acetaminophen] Headache     Severe headaches   (denies issues with Tylenol)    Betadine [Povidone Iodine] Rash     Unsure if betadine or gauze post surgical  Got rash on leg       Chlorhexidine Rash       Review of Systems   Constitutional: Positive for activity change  Negative for appetite change, fatigue and fever  HENT: Negative for congestion and rhinorrhea  Genitourinary: Negative for frequency  Musculoskeletal: Positive for gait problem  Neurological: Negative for dizziness, light-headedness, numbness and headaches  Psychiatric/Behavioral: Negative for sleep disturbance  The patient is not nervous/anxious  Video Exam    Vitals:       Physical Exam  Vitals reviewed  Pulmonary:      Effort: Pulmonary effort is normal    Neurological:      Mental Status: She is alert and oriented to person, place, and time  I spent 15 minutes directly with the patient during this visit    VIRTUAL VISIT Raghavendra Yan verbally agrees to participate in [employer]  Pt is aware that [employer] could be limited without vital signs or the ability to perform a full hands-on physical Loismere Shaila understands she or the provider may request at any time to terminate the video visit and request the patient to seek care or treatment in person

## 2022-03-10 ENCOUNTER — TELEPHONE (OUTPATIENT)
Dept: FAMILY MEDICINE CLINIC | Facility: CLINIC | Age: 52
End: 2022-03-10

## 2022-03-10 NOTE — TELEPHONE ENCOUNTER
Becca from [address] called in to report that patient stated she has been having urgencies to cut herself as she use to in the past  Patient was refusing to go to the er

## 2022-03-17 NOTE — PROGRESS NOTES
PT Evaluation     Today's date: 3/21/2022  Patient name: Josh Sims  : 1970  MRN: [id number]  Referring provider: Karma Borrego DO  Dx:   Encounter Diagnosis     ICD-10-CM    1  Right knee pain, unspecified chronicity  M25 561    2  History of right knee joint replacement  Z96 651                   Assessment  Assessment details: Josh Sims is a 49 y o  female who presents s/p right TKR 22  She had a few weeks of home PT and presents today to outpatient PT  She demonstrates abnormal gait, decreased balance, decreased knee ROM, decreased strength, decreased activity tolerance, and decreased function  Patient would benefit from skilled physical therapy in order to address said deficits and improve functional mobility  Goals  STG:  Decrease pain 1 grade  Independent with basic HEP   Pt will transfer out of chair without UE assist    LTG:  Perform stairs reciprocally  Ambulate without use of AD   Decrease pain 2 grades   Increase FOTO to expected score   Increase knee ROM to equal to contralateral side       Plan  Patient would benefit from: skilled physical therapy  Planned therapy interventions: abdominal trunk stabilization, manual therapy, joint mobilization, activity modification, massage, balance, neuromuscular re-education, behavior modification, patient education, postural training, body mechanics training, strengthening, stretching, therapeutic exercise, therapeutic activities, functional ROM exercises, flexibility and home exercise program  Frequency: 2x week  Duration in weeks: 4  Treatment plan discussed with: patient        Subjective Evaluation    History of Present Illness  Mechanism of injury: S/p right TKR on 21  She had home PT for a few weeks, up until last week  She is sleeping in a hospital bed    She is ambulating with SPC for the most part and she started driving last week       She cannot stand too long at this time and notes she is using a shower chair to shower  She has mild difficulty getting on socks and shoes  She is is to return to work  and sees MD again tomorrow     Pain  Current pain rating: 3  At worst pain ratin    Patient Goals  Patient goal: unable to perform stairs reciproccally, stand for work > 1 hour         Objective     Active Range of Motion   Left Knee   Flexion: 128 degrees   Extension: 0 degrees     Right Knee   Flexion: 103 degrees   Extension: 5 degrees     Tests     Additional Tests Details  5TSTS: 17 54 seconds with arm rests at eval     TU 44 seconds at eval with arm rests and SPC              Precautions: hx self harm, hx colon CA, COPD    Manuals 3/21            Knee stretching  5'                                                   Neuro Re-Ed             [employer]                                                                              Ther Ex             bike 5'              at bar  2#           Standing hip abd  2#           Standing hip ext  2#           Mini squats    Leg press           Heel/toe raises              Knee ext machine LAQ NV            Ham curl machine  standing ham curls NV            TB side step NV            Ther Activity             Step up and overs NV            Sit to stands from chair with foam NV                         Retro walks mariela                                        Gait Training             Without AD    NV

## 2022-03-21 ENCOUNTER — EVALUATION (OUTPATIENT)
Dept: PHYSICAL THERAPY | Facility: CLINIC | Age: 52
End: 2022-03-21
Payer: MEDICARE

## 2022-03-21 DIAGNOSIS — M25.561 RIGHT KNEE PAIN, UNSPECIFIED CHRONICITY: Primary | ICD-10-CM

## 2022-03-21 DIAGNOSIS — Z96.651 HISTORY OF RIGHT KNEE JOINT REPLACEMENT: ICD-10-CM

## 2022-03-21 PROCEDURE — 97110 THERAPEUTIC EXERCISES: CPT | Performed by: PHYSICAL THERAPIST

## 2022-03-21 PROCEDURE — 97161 PT EVAL LOW COMPLEX 20 MIN: CPT | Performed by: PHYSICAL THERAPIST

## 2022-03-21 NOTE — LETTER
2022    Cristobal Pereira, [address] [address] [address]  [address]  [address] [facility]    Patient: Ruma Killian   YOB: 1970   Date of Visit: 3/21/2022     Encounter Diagnosis     ICD-10-CM    1  Right knee pain, unspecified chronicity  M25 561    2  History of right knee joint replacement  Z96 651        Dear Dr Echeverria Sink: Thank you for your recent referral of Ruma Killian  Please review the attached evaluation summary from Blanca's recent visit  Please verify that you agree with the plan of care by signing the attached order  If you have any questions or concerns, please do not hesitate to call  I sincerely appreciate the opportunity to share in the care of one of your patients and hope to have another opportunity to work with you in the near future  Sincerely,    Ketan Baker, PT      Referring Provider:      I certify that I have read the below Plan of Care and certify the need for these services furnished under this plan of treatment while under my care  Cristobal Pereira DO  [address] [address] [address]  [address]  [address] [address]  Via Fax: [phone]          PT Evaluation     Today's date: 3/21/2022  Patient name: Ruma Killian  : 1970  MRN: [id number]  Referring provider: Marisol Rivera DO  Dx:   Encounter Diagnosis     ICD-10-CM    1  Right knee pain, unspecified chronicity  M25 561    2  History of right knee joint replacement  Z96 651                   Assessment  Assessment details: Ruma Killian is a 49 y o  female who presents s/p right TKR 22  She had a few weeks of home PT and presents today to outpatient PT  She demonstrates abnormal gait, decreased balance, decreased knee ROM, decreased strength, decreased activity tolerance, and decreased function  Patient would benefit from skilled physical therapy in order to address said deficits and improve functional mobility       Goals  STG:  Decrease pain 1 grade  Independent with basic HEP   Pt will transfer out of chair without UE assist    LTG:  Perform stairs reciprocally  Ambulate without use of AD   Decrease pain 2 grades   Increase FOTO to expected score   Increase knee ROM to equal to contralateral side       Plan  Patient would benefit from: skilled physical therapy  Planned therapy interventions: abdominal trunk stabilization, manual therapy, joint mobilization, activity modification, massage, balance, neuromuscular re-education, behavior modification, patient education, postural training, body mechanics training, strengthening, stretching, therapeutic exercise, therapeutic activities, functional ROM exercises, flexibility and home exercise program  Frequency: 2x week  Duration in weeks: 4  Treatment plan discussed with: patient        Subjective Evaluation    History of Present Illness  Mechanism of injury: S/p right TKR on 21  She had home PT for a few weeks, up until last week  She is sleeping in a hospital bed    She is ambulating with SPC for the most part and she started driving last week  She cannot stand too long at this time and notes she is using a shower chair to shower  She has mild difficulty getting on socks and shoes  She is is to return to work  and sees MD again tomorrow     Pain  Current pain rating: 3  At worst pain ratin    Patient Goals  Patient goal: unable to perform stairs reciproccally, stand for work > 1 hour         Objective     Active Range of Motion   Left Knee   Flexion: 128 degrees   Extension: 0 degrees     Right Knee   Flexion: 103 degrees   Extension: 5 degrees     Tests     Additional Tests Details  5TSTS: 17 54 seconds with arm rests at eval     TU 44 seconds at eval with arm rests and SPC              Precautions: hx self harm, hx colon CA, COPD    Manuals 3/21            Knee stretching  5'                                                   Neuro Re-Ed             [employer] Ther Ex             bike 5'             Marches at bar  2#           Standing hip abd  2#           Standing hip ext  2#           Mini squats    Leg press           Heel/toe raises              Knee ext machine LAQ NV            Ham curl machine  standing ham curls NV            TB side step NV            Ther Activity             Step up and overs NV            Sit to stands from chair with foam NV                         Retro walks mariela                                        Gait Training             Without AD    NV

## 2022-03-23 DIAGNOSIS — I10 HYPERTENSION, UNSPECIFIED TYPE: ICD-10-CM

## 2022-03-24 ENCOUNTER — RA CDI HCC (OUTPATIENT)
Dept: OTHER | Facility: HOSPITAL | Age: 52
End: 2022-03-24

## 2022-03-24 ENCOUNTER — PROCEDURE VISIT (OUTPATIENT)
Dept: NEUROLOGY | Facility: CLINIC | Age: 52
End: 2022-03-24
Payer: MEDICARE

## 2022-03-24 ENCOUNTER — OFFICE VISIT (OUTPATIENT)
Dept: PHYSICAL THERAPY | Facility: CLINIC | Age: 52
End: 2022-03-24
Payer: MEDICARE

## 2022-03-24 VITALS — TEMPERATURE: 96.7 F | SYSTOLIC BLOOD PRESSURE: 135 MMHG | DIASTOLIC BLOOD PRESSURE: 65 MMHG | HEART RATE: 69 BPM

## 2022-03-24 DIAGNOSIS — F32.2 CURRENT SEVERE EPISODE OF MAJOR DEPRESSIVE DISORDER WITHOUT PSYCHOTIC FEATURES WITHOUT PRIOR EPISODE (HCC): ICD-10-CM

## 2022-03-24 DIAGNOSIS — Z96.651 HISTORY OF RIGHT KNEE JOINT REPLACEMENT: ICD-10-CM

## 2022-03-24 DIAGNOSIS — G43.709 CHRONIC MIGRAINE WITHOUT AURA WITHOUT STATUS MIGRAINOSUS, NOT INTRACTABLE: Primary | ICD-10-CM

## 2022-03-24 DIAGNOSIS — R41.89 IMPAIRED COGNITION: ICD-10-CM

## 2022-03-24 DIAGNOSIS — M25.561 RIGHT KNEE PAIN, UNSPECIFIED CHRONICITY: Primary | ICD-10-CM

## 2022-03-24 PROCEDURE — 97140 MANUAL THERAPY 1/> REGIONS: CPT

## 2022-03-24 PROCEDURE — 97110 THERAPEUTIC EXERCISES: CPT

## 2022-03-24 PROCEDURE — 97530 THERAPEUTIC ACTIVITIES: CPT

## 2022-03-24 PROCEDURE — 64615 CHEMODENERV MUSC MIGRAINE: CPT | Performed by: PHYSICIAN ASSISTANT

## 2022-03-24 NOTE — PROGRESS NOTES
During treatment session on 3/24/22 I observed a wound located just distal to the patient's surgical site on the lower extremity  The concerning site was examined, and was indeed open and demonstrating some clear sanguinous drainage  The patient does not demonstrate any abnormal coloring, odor, or warmth  The patient was educated to communicate with her surgeon's office, which she reports that she has already done and has an appointment tomorrow  The patient was also instructed to keep the wound clean and covered until her appointment tomorrow to avoid any unwanted exposure

## 2022-03-24 NOTE — PROGRESS NOTES

## 2022-03-24 NOTE — PROGRESS NOTES
Daily Note     Today's date: 3/24/2022  Patient name: Matteo Moses  : 1970  MRN: [id number]  Referring provider: Maximiano Osgood, DO  Dx:   Encounter Diagnosis     ICD-10-CM    1  Right knee pain, unspecified chronicity  M25 561    2  History of right knee joint replacement  Z96 651                   Subjective: Pt states she noticed some drainage from a wound on her shin yesterday; states it was clear, and notes there was no blood or pus  Pt reports she has an appt tomorrow morning w/ MD to assess wound  Pt also reports she has been losing her balance at home but has not fallen  Encouraged pt to be compliant w/ SPC until balance is improved  Objective: See treatment diary below      Assessment: Tolerated treatment well  Patient would benefit from continued PT  Pt denies calf pain, fever  No increased discomfort noted w/ TE performed  No significant discomfort noted w/ manual knee stretching  Plan: Progress treatment as tolerated         Precautions: hx self harm, hx colon CA, COPD    Manuals 3/21 3/24           Knee stretching  5' 10'                                                  Neuro Re-Ed             [employer]              SLS                                                                              Ther Ex             bike 5'  5'            at bar  2x10 alt 2# NV          Standing hip abd  2x10 R 2# nv          Standing hip ext  2x10 R 2# NV          Mini squats    Leg press           Heel/toe raises              Knee ext machine LAQ NV LAQ 2x10           Ham curl machine  standing ham curls NV 2x10           TB side step NV 2 laps no TB           Ther Activity             Step up and overs NV NV           Sit to stands from chair with foam NV NV                        Retro walks mariela                                        Gait Training             Without AD    NV

## 2022-03-24 NOTE — PROGRESS NOTES
[address]  coding opportunities       Chart reviewed, no opportunity found: CHART REVIEWED, NO OPPORTUNITY FOUND        Patients Insurance     Medicare Insurance: Medicare

## 2022-03-29 ENCOUNTER — OFFICE VISIT (OUTPATIENT)
Dept: PHYSICAL THERAPY | Facility: CLINIC | Age: 52
End: 2022-03-29
Payer: MEDICARE

## 2022-03-29 DIAGNOSIS — M25.561 RIGHT KNEE PAIN, UNSPECIFIED CHRONICITY: Primary | ICD-10-CM

## 2022-03-29 DIAGNOSIS — Z96.651 HISTORY OF RIGHT KNEE JOINT REPLACEMENT: ICD-10-CM

## 2022-03-29 PROCEDURE — 97140 MANUAL THERAPY 1/> REGIONS: CPT

## 2022-03-29 PROCEDURE — 97110 THERAPEUTIC EXERCISES: CPT

## 2022-03-29 PROCEDURE — 97530 THERAPEUTIC ACTIVITIES: CPT

## 2022-03-29 NOTE — PROGRESS NOTES
Daily Note     Today's date: 3/29/2022  Patient name: Yung Angeles  : 1970  MRN: [id number]  Referring provider: Norberto Barone DO  Dx:   Encounter Diagnosis     ICD-10-CM    1  Right knee pain, unspecified chronicity  M25 561    2  History of right knee joint replacement  Z96 651                   Subjective: Pt states she went to [facility] early this morning for an US of her calf  Pt presents copy of US results via her phone, which states no DVT was noted  Pt denies calf pain; c/o anterior knee discomfort  Pt states she continues to take an aspirin a day  Also reports she saw MD on Friday to assess small wound on anterior shin  Pt states she was not given antibiotics and states the wound was "closed up" but noted it was "oozing" a little this morning and believed she saw white/yellow pus;  Pt notes MD is aware of this  Objective: See treatment diary below      Assessment: Tolerated treatment well  Patient would benefit from continued PT  No redness noted at anterior shin; pt reports skin is no longer itchy as well  Tolerates addition of SLS well  Min discomfort w/ passive knee extension stretching; tolerates flexion well  Plan: Progress treatment as tolerated  Advised pt to ice and elevate at home to help minimize swelling  Reminded pt she is not to submerge/soak R LE in bath tubs, hot tubs, etc until incisions are completely healed to avoid risk of infection       Precautions: hx self harm, hx colon CA, COPD    Manuals 3/21 3/24 3/29          Knee stretching  5' 10' 10'                                                 Neuro Re-Ed             [employer]              SLS   10"x5                                                                           Ther Ex             bike 5'  5' 5'           at bar  2x10 alt  2x10 2#         Standing hip abd  2x10 R  2x10 2#          Standing hip ext  2x10 R  2x10 2#         Mini squats     Leg press         Heel/toe raises              Knee ext machine LAQ NV LAQ 2x10 LAQ 2x10          Ham curl machine  standing ham curls NV 2x10 2x10          TB side step NV 2 laps no TB 2 laps           Ther Activity             Step up and overs NV NV NV          Sit to stands from chair with foam NV NV x10                       Retro walks mariela                                        Gait Training             Without AD    NV

## 2022-03-30 ENCOUNTER — OFFICE VISIT (OUTPATIENT)
Dept: FAMILY MEDICINE CLINIC | Facility: CLINIC | Age: 52
End: 2022-03-30
Payer: MEDICARE

## 2022-03-30 VITALS
DIASTOLIC BLOOD PRESSURE: 80 MMHG | HEIGHT: 66 IN | BODY MASS INDEX: 44.93 KG/M2 | HEART RATE: 92 BPM | OXYGEN SATURATION: 98 % | RESPIRATION RATE: 16 BRPM | TEMPERATURE: 97.2 F | SYSTOLIC BLOOD PRESSURE: 124 MMHG | WEIGHT: 279.6 LBS

## 2022-03-30 DIAGNOSIS — E03.9 HYPOTHYROIDISM: Primary | ICD-10-CM

## 2022-03-30 DIAGNOSIS — R21 RASH: ICD-10-CM

## 2022-03-30 PROCEDURE — 99214 OFFICE O/P EST MOD 30 MIN: CPT | Performed by: NURSE PRACTITIONER

## 2022-03-30 RX ORDER — TRIAMCINOLONE ACETONIDE 1 MG/G
CREAM TOPICAL 2 TIMES DAILY
Qty: 30 G | Refills: 0 | Status: SHIPPED | OUTPATIENT
Start: 2022-03-30 | End: 2022-07-15

## 2022-03-30 RX ORDER — LEVOTHYROXINE SODIUM 88 UG/1
88 TABLET ORAL
Qty: 90 TABLET | Refills: 1 | Status: SHIPPED | OUTPATIENT
Start: 2022-03-30 | End: 2022-05-26 | Stop reason: SDUPTHER

## 2022-03-30 RX ORDER — LEVOTHYROXINE SODIUM 88 UG/1
88 TABLET ORAL
Qty: 90 TABLET | Refills: 1 | Status: SHIPPED | OUTPATIENT
Start: 2022-03-30 | End: 2022-03-30 | Stop reason: SDUPTHER

## 2022-03-30 NOTE — PROGRESS NOTES
Assessment/Plan:         Diagnoses and all orders for this visit:    Hypothyroidism  -     levothyroxine 88 mcg tablet; Take 1 tablet (88 mcg total) by mouth daily in the early morning  -     TSH, 3rd generation with Free T4 reflex; Future  REPEAT TSH IN MAY    Rash  -     triamcinolone (KENALOG) 0 1 % cream; Apply topically 2 (two) times a day          Subjective:      Patient ID: Sarina Hill is a 49 y o  female  HPI  Having issues with her surgical right knee    Having pain and drainage  Having a rash too due to rash after surgery  Had after surgery  Was on prednisone  Concern with the thyroid      The following portions of the patient's history were reviewed and updated as appropriate: allergies, current medications, past family history, past medical history, past social history, past surgical history and problem list     Review of Systems   Constitutional: Positive for activity change  Negative for appetite change, fatigue and fever  HENT: Negative for congestion and rhinorrhea  Respiratory: Negative for cough  Cardiovascular: Negative for chest pain  Gastrointestinal: Negative for abdominal pain  Genitourinary: Negative for urgency  Musculoskeletal: Positive for gait problem and joint swelling  Skin: Positive for rash  Neurological: Negative for dizziness, light-headedness and headaches  Objective:  Vitals:    03/30/22 0759   BP: 124/80   BP Location: Left arm   Patient Position: Sitting   Cuff Size: Large   Pulse: 92   Resp: 16   Temp: (!) 97 2 °F (36 2 °C)   TempSrc: Tympanic   SpO2: 98%   Weight: 127 kg (279 lb 9 6 oz)   Height: 5' 6" (1 676 m)      Physical Exam  Vitals reviewed  Constitutional:       Appearance: Normal appearance  She is obese     HENT:      Right Ear: Tympanic membrane, ear canal and external ear normal       Left Ear: Tympanic membrane, ear canal and external ear normal       Ears:     Eyes:      Conjunctiva/sclera: Conjunctivae normal  Cardiovascular:      Rate and Rhythm: Normal rate and regular rhythm  Pulses: Normal pulses  Heart sounds: Normal heart sounds  Pulmonary:      Effort: Pulmonary effort is normal       Breath sounds: Normal breath sounds  Musculoskeletal:         General: Normal range of motion  Cervical back: Normal range of motion  Skin:     General: Skin is warm  Neurological:      Mental Status: She is alert and oriented to person, place, and time  Psychiatric:         Mood and Affect: Mood normal          Behavior: Behavior normal          Thought Content:  Thought content normal

## 2022-03-31 ENCOUNTER — TELEPHONE (OUTPATIENT)
Dept: OBGYN CLINIC | Facility: CLINIC | Age: 52
End: 2022-03-31

## 2022-03-31 ENCOUNTER — OFFICE VISIT (OUTPATIENT)
Dept: PHYSICAL THERAPY | Facility: CLINIC | Age: 52
End: 2022-03-31
Payer: MEDICARE

## 2022-03-31 DIAGNOSIS — M25.561 RIGHT KNEE PAIN, UNSPECIFIED CHRONICITY: ICD-10-CM

## 2022-03-31 DIAGNOSIS — Z96.651 HISTORY OF RIGHT KNEE JOINT REPLACEMENT: Primary | ICD-10-CM

## 2022-03-31 PROCEDURE — 97112 NEUROMUSCULAR REEDUCATION: CPT | Performed by: PHYSICAL THERAPIST

## 2022-03-31 PROCEDURE — 97140 MANUAL THERAPY 1/> REGIONS: CPT | Performed by: PHYSICAL THERAPIST

## 2022-03-31 PROCEDURE — 97110 THERAPEUTIC EXERCISES: CPT | Performed by: PHYSICAL THERAPIST

## 2022-03-31 NOTE — TELEPHONE ENCOUNTER
Patient was transferred to me from the phone room  She was very angry and upset that she had to wait too long to check in for her appt with Dr Tiago Sanchez this morning  She was crying and swearing and saying that she didn't want to go back  to the [employer]  I apologized for the inconvenience and bad experience and told her I would look into the matter  I offered to get her rescheduled and The patient kept using foul language saying this is f** Bull** and I asked her to refrain from using foul language so we could fix this for her and she hung up

## 2022-03-31 NOTE — TELEPHONE ENCOUNTER
Pt was in line to check in and waited about 5 minutes  She yelled over top of another patient that I was checking in and stated that because I was taking to long it made her late for her appointment and then she would have to sit in the back and wait forever  Her appt was at 9:30 and I finished checking in the other pt at 9:35  She said we all think it's funny and she slammed out the waiting room w/her cane saying nothing is funny but no one was laughing   Sameul Bigger is aware of situation

## 2022-03-31 NOTE — PROGRESS NOTES
Daily Note     Today's date: 3/31/2022  Patient name: Ulises Spain  : 1970  MRN: [id number]  Referring provider: Yesenia Villatoro DO  Dx:   Encounter Diagnosis     ICD-10-CM    1  History of right knee joint replacement  Z96 651    2  Right knee pain, unspecified chronicity  M25 561                   Subjective: Pt states she is having a bad two days with tremors and stress  She feels like her tremors get much worse when she is stressed  She states she saw ortho yesterday and they arent concerned about it  She goes back to hours tomorrow 3 1/2 hours a day and 3 days a week  She notes she slept good last night and has a good day today for her knee pain  Objective: See treatment diary below      Assessment: Tolerated treatment well  Patient would benefit from continued PT  Patient with tenderness at medial knee today  She moves through all exercises with progressions without complaints  Slight increase in fatigue noted  With good form and tolerance to activity  Defers ice though has soreness post session today  Plan: Progress treatment as tolerated         Precautions: hx self harm, hx colon CA, COPD    Manuals 3/21 3/24 3/29 3/31         Knee stretching  5' 10' 10' 10'                                                Neuro Re-Ed             Steamboats              SLS   10"x5          SLR flexion    2x10          SLR abduction     NV                                                Ther Ex             bike 5'  5' 5' 5'         Marches at bar  2x10 alt  2x10 2# 2x10          Standing hip abd  2x10 R  2x10 2# 2x10          Standing hip ext  2x10 R  2x10 2#  2x10          Mini squats     Leg press           Heel/toe raises              Knee ext machine LAQ NV LAQ 2x10 LAQ 2x10 4# 2x10         Ham curl machine  standing ham curls NV 2x10 2x10          TB side step NV 2 laps no TB 2 laps  Red TB  x2 laps         Ther Activity             Step up and overs NV NV NV 4" x10  6" x10  Then 4" stairs x1 and 6" x2         Sit to stands from chair with foam NV NV x10                       Retro walks mariela              Heel slides     10x5"                       Gait Training             Without AD    NV

## 2022-04-01 NOTE — NURSING NOTE
Pt with staff due to SI statements and inability to contract for safety  At 0355 pt woke and pulled off CPAP mask, throwing it at the wall  Staff trying to verbally deescalate  Writer approached and pt is hunched over, crying, irritable   Reports frustration with roommate's snoring  Pt raising voice, "if I can't sleep then no one is going to sleep " Pt then crying again  Pt agreeable to take medication for anxiety and agitation to help remain calm and possibly return to sleep  Received Haldol 5mg, Atarax 100mg po at 0400  Pt was also offered ear plugs  Pt agreeable to sleep upright in recliner in Quiet Room to allow for remaining few hours of sleep without CPAP, due to no outlet  Pt is focused on room change  Surgery Intro:     Pre-operative Diagnosis:  1. Left 4th toe osteomyelitis  2. Left 4th toe Meyer grade 3 ulceration with exposed joint and bone   3. Left foot cellulitis  4. Diabetes mellitus with peripheral neuropathy    Post-operative Diagnosis: Same    Procedures Performed:  1. Left 4th toe amputation    Surgeon: Thanh Fong DPM    Assistants: none    Anesthesia: Anesthesiologist: Hayley Lester MD [facility]     EBL: Minimal     Complications: None    Unusual Procedural Service:   None    Specimens:  Culture: Left 4th toe deep swab.  Pathology: Left 4th toe    Implants Used: * No implants in log *      Tourniquet: Ankle esmarch at manual pressure    Indications: The patient presented to the office complaining of pain in the affected area.  Patient's condition has been unresponsive or not amenable to conservative care to this point and therefore surgical options were offered at this time along all potential risks, benefits, as well as treatment alternatives. I explained all this fully to the patient's level of understanding. No guarantees are given. Of note clinical and radiographic findings to correlate well with the above diagnosis.    Procedure:   The patient was brought to the operating room via gurney and was placed upon the operating table in the supine position. Upon administration of IV sedation the area was scrubbed, prepped, and draped in the usual aseptic manner. The area was exsanguinated and hemostasis was applied.    Attention was directed to the left 4th toe.  A penetrating towel clip was placed through the end of the toe.  A circumferential fishmouth incision was made around the base of the left 4th toe directly down to bone.  The toes disarticulated at the metatarsophalangeal joint.  Significant amounts of purulence and malodor was noted.  The toes removed from the operative field.  A swab through the deep portion of the toe was performed and sent for aerobic,  anaerobic, and Gram stain.  In addition the remaining portion of the toe was sent for pathological analysis to evaluate for osteomyelitis.  The areas inspected to look for any remaining abnormal tissue.  Significant amounts of purulence is noted.  Any nonviable or necrotic areas were sharply removed utilizing scalpel blade.  This is taken down to healthy bleeding tissue throughout.  The areas then extensively flushed.  No remaining abnormal tissue was found.  The decision was made to primarily close the area.    The area was then flushed with copious amounts of sterile normal saline.  All subcutaneous tissues reapproximated and coapted utilizing 3-0 Vicryl.  All skin was reapproximated and coapted with skin closure.  The hemostasis was released and there was immediate hyperemic response being noted to all toes of the operative extremity.  The areas were then dressed with Xeroform, sterile 4 x 4's, Webril, and ACE bandage in the form of a moderate compressive dressing.     The patient was then brought back to PACU for postoperative monitoring where it was noted that all vital signs were stable and vascular status was intact to the operative extremity.  Patient was instructed to remain strictly weightbearing only in the surgical shoe or boot, was given postoperative medications, and was instructed to follow-up as scheduled.

## 2022-04-05 ENCOUNTER — OFFICE VISIT (OUTPATIENT)
Dept: PHYSICAL THERAPY | Facility: CLINIC | Age: 52
End: 2022-04-05
Payer: MEDICARE

## 2022-04-05 DIAGNOSIS — Z96.651 HISTORY OF RIGHT KNEE JOINT REPLACEMENT: Primary | ICD-10-CM

## 2022-04-05 DIAGNOSIS — M25.561 RIGHT KNEE PAIN, UNSPECIFIED CHRONICITY: ICD-10-CM

## 2022-04-05 PROCEDURE — 97112 NEUROMUSCULAR REEDUCATION: CPT | Performed by: PHYSICAL THERAPIST

## 2022-04-05 PROCEDURE — 97140 MANUAL THERAPY 1/> REGIONS: CPT | Performed by: PHYSICAL THERAPIST

## 2022-04-05 NOTE — PROGRESS NOTES
Daily Note     Today's date: 2022  Patient name: Roseline Louis  : 1970  MRN: [id number]  Referring provider: Althea Linn DO  Dx:   Encounter Diagnosis     ICD-10-CM    1  History of right knee joint replacement  Z96 651    2  Right knee pain, unspecified chronicity  M25 561                   Subjective: Pt states she went back to work this week with no problems  She was able to pefrorm stairs without difficulty  Objective: See treatment diary below      Assessment: Tolerated treatment well  Patient would benefit from continued PT  Patient presents without SPC today and ambulating safely  She demonstrates good form and tolerance with all exercises  Mild soreness at the end range with stretching into flexion  She continues to improve steadily with strength and mobility  Plan: Progress treatment as tolerated         Precautions: hx self harm, hx colon CA, COPD    Manuals 3/21 3/24 3/29 3/31 45        Knee stretching  5' 10' 10' 10' 10'                                               Neuro Re-Ed             Steamboats              SLS   10"x5          SLR flexion    2x10  2x10        SLR abduction     NV 2x10                                                Ther Ex             bike 5'  5' 5' 5' 5'        Leg press     Leg press   50# 2x10  60#       Heel/toe raises              Knee ext machine LAQ NV LAQ 2x10 LAQ 2x10 4# 2x10 4# 2x10  DC       Ham curl machine  standing ham curls NV 2x10 2x10          TB side step NV 2 laps no TB 2 laps  Red TB  x2 laps R TB x2         Ther Activity             Step up and overs NV NV NV 4" x10  6" x10  Then 4" stairs x1 and 6" x2         Sit to stands from chair with foam NV NV x10                       Retro walks mariela              Heel slides     10x5"  10x5"                      Gait Training             Without AD    NV

## 2022-04-06 ENCOUNTER — TELEPHONE (OUTPATIENT)
Dept: OBGYN CLINIC | Facility: HOSPITAL | Age: 52
End: 2022-04-06

## 2022-04-06 ENCOUNTER — OFFICE VISIT (OUTPATIENT)
Dept: OBGYN CLINIC | Facility: CLINIC | Age: 52
End: 2022-04-06
Payer: MEDICARE

## 2022-04-06 VITALS
DIASTOLIC BLOOD PRESSURE: 76 MMHG | BODY MASS INDEX: 44.84 KG/M2 | SYSTOLIC BLOOD PRESSURE: 124 MMHG | WEIGHT: 279 LBS | HEIGHT: 66 IN | HEART RATE: 67 BPM

## 2022-04-06 DIAGNOSIS — M77.02 MEDIAL EPICONDYLITIS OF LEFT ELBOW: Primary | ICD-10-CM

## 2022-04-06 PROCEDURE — 99214 OFFICE O/P EST MOD 30 MIN: CPT | Performed by: ORTHOPAEDIC SURGERY

## 2022-04-06 NOTE — TELEPHONE ENCOUNTER
Dr Mayra Urban    Patient needs the order for the ultrasound-guided medial epicondyle injection be put into [facility] so she can be scheduled       #  [id number]

## 2022-04-06 NOTE — PROGRESS NOTES
[facility] [address]  [phone]       Yokasta Cisneros  [id number]  1970    ORTHOPAEDIC SURGERY OUTPATIENT NOTE  4/6/2022      HISTORY:  49 y o  female  presents with chronic left elbow medial epicondylitis  Patient localizes pain to the medial epicondyle  She does have a significant surgical history of left ulnar nerve decompression  The patient is unsure if there was a transposition  This was done at outside facility  Patient states she has had steroid injection at the medial epicondyle before with relief and wishes to have her repeat injection of the medial epicondyle today  Past Medical History:   Diagnosis Date    Anxiety     Arthritis     oa cassandra knees    Asthma     good control- no medications    Yan's esophagus     Bipolar affective disorder (HCC)     Chronic pain disorder     lumbar    CPAP (continuous positive airway pressure) dependence     Degenerative disc disease at L5-S1 level     Deliberate self-cutting     Depression 09/16/2008    Disease of thyroid gland     hypo    MARTINEZ (dyspnea on exertion)     Drug overdose 10/28/2008    suicide attempt    Dysphagia     Dyspnea     Edema     BLE    GERD (gastroesophageal reflux disease)     High blood pressure     History of COVID-19 12/30/2020    Symptoms started on 12/30/2020 and then got worse  Today she is feeling a little bit better    She is a candidate for monoclonal antibody infusion and set for 1/6/21 @ 1pm at [facility]  01/07/21 - telemedicine -     Hypertension     Knee pain, bilateral     Especially right    Migraines     Obese     Overactive bladder     Sjogren's disease ([facility] [address] )     Sleep apnea     Stress incontinence     Suicidal ideations     Umbilical hernia     surgical repair today 4/24/2019    Use of cane as ambulatory aid     awaiting b/l knee replacement    Wears glasses        Past Surgical History:   Procedure Laterality Date    BREAST BIOPSY Right     benign    CARPAL TUNNEL RELEASE Left     CHOLECYSTECTOMY  2003    Laparoscopic    COLONOSCOPY      2009    DILATION AND CURETTAGE OF UTERUS      ELBOW SURGERY Left     x2  No hardware    ESOPHAGOGASTRODUODENOSCOPY      FOOT SURGERY Left     Plantar fasciotomy    HYSTERECTOMY      laporoscopic, partial    KNEE ARTHROSCOPY Bilateral     OOPHORECTOMY Left 10/2015    [place] ANAL SPHINCTEROTOMY N/A 2018    Procedure: EUA, LEFT PARTIAL INTERNAL SPHINCTEROTOMY;  Surgeon: Son Drake MD;  Location: [address] [place] [address] MAIN OR;  Service: Colorectal    [place] GASTROCNEMIUS RECESSION Left 2021    Procedure: RECESSION GASTROC OPEN;  Surgeon: Elise Aguirre DPM;  Location: [address] [place] [address] MAIN OR;  Service: [place] [address] BUWW,3+O/C,CSSEE N/A 2019    Procedure: REPAIR HERNIA UMBILICAL LAPAROSCOPIC W/ ROBOTICS;  Surgeon: Roxanne Junior MD;  Location: [facility] Main OR;  Service: General    REDUCTION MAMMAPLASTY Bilateral     REPAIR LACERATION Left     left hand-2009    ROTATOR CUFF REPAIR Left     TONSILECTOMY AND ADNOIDECTOMY      TONSILLECTOMY      US GUIDED MSK PROCEDURE  2021    VEIN LIGATION Bilateral     WISDOM TOOTH EXTRACTION         Social History     Socioeconomic History    Marital status: Single     Spouse name: Not on file    Number of children: Not on file    Years of education: Not on file    Highest education level: Not on file   Occupational History    Not on file   Tobacco Use    Smoking status: Former Smoker     Packs/day: 2 00     Years: 33 00     Pack years: 66 00     Types: Cigarettes     Start date: 5     Quit date: 2018     Years since quittin 2    Smokeless tobacco: Never Used    Tobacco comment: Started at age 14     Vaping Use    Vaping Use: Some days    Substances: THC   Substance and Sexual Activity    Alcohol use: Not Currently     Comment: Recovering alcoholic    Drug use: Yes     Types: Marijuana, Methamphetamines     Comment: medical- vapes 3 times per wk at hs    Sexual activity: Not Currently   Other Topics Concern    Not on file   Social History Narrative    Not on file     Social Determinants of Health     Financial Resource Strain: Not on file   Food Insecurity: Not on file   Transportation Needs: Not on file   Physical Activity: Not on file   Stress: Not on file   Social Connections: Not on file   Intimate Partner Violence: Not on file   Housing Stability: Not on file       Family History   Problem Relation Age of Onset    Kidney cancer Mother     Diabetes Mother     Colon cancer Family     No Known Problems Father     No Known Problems Sister     Colon cancer Paternal Uncle     Colon cancer Maternal Uncle     Colon cancer Maternal Uncle         Patient's Medications   New Prescriptions    No medications on file   Previous Medications    AMLODIPINE (NORVASC) 5 MG TABLET    Take 1 tablet (5 mg total) by mouth daily    BUPROPION (WELLBUTRIN XL) 150 MG 24 HR TABLET    Take 150 mg by mouth daily    BUPROPION (WELLBUTRIN XL) 300 MG 24 HR TABLET    Take 1 tablet (300 mg total) by mouth daily    CHOLECALCIFEROL (VITAMIN D3) 125 MCG (5000 UT) TABS    Take 5,000 Units by mouth daily    COLESTIPOL (COLESTID) 1 G TABLET    Take 1 tablet (1 g total) by mouth 2 (two) times a day    DIVALPROEX SODIUM (DEPAKOTE ER) 250 MG 24 HR TABLET    Take 3 tablets (750 mg total) by mouth daily    DIVALPROEX SODIUM (DEPAKOTE ER) 500 MG 24 HR TABLET    Take 1,000 mg by mouth daily    ESTRADIOL (ESTRACE) 1 MG TABLET    Take 1 tablet (1 mg total) by mouth daily    FESOTERODINE FUMARATE ER (TOVIAZ) 4 MG TB24    Take 1 tablet by mouth daily    FLUTICASONE (FLOVENT HFA) 110 MCG/ACT INHALER    Inhale 2 puffs 2 (two) times a day Rinse mouth after use      FUROSEMIDE (LASIX) 20 MG TABLET    Take 1 tablet (20 mg total) by mouth daily    HALOPERIDOL DECANOATE (HALDOL DECANOATE) 50 MG/ML INJECTION    Inject 50 mg into a muscle every 30 (thirty) days    LEVOTHYROXINE 88 MCG TABLET    Take 1 tablet (88 mcg total) by mouth daily in the early morning    LOSARTAN (COZAAR) 50 MG TABLET    Take 1 tablet (50 mg total) by mouth daily    LURASIDONE 60 MG TABS    Take 1 tablet (60 mg total) by mouth daily with dinner    METOPROLOL TARTRATE (LOPRESSOR) 25 MG TABLET    Take 1 tablet (25 mg total) by mouth every 12 (twelve) hours    PILOCARPINE (SALAGEN) 5 MG TABLET    Take 5 mg by mouth 3 (three) times a day    RABEPRAZOLE (ACIPHEX) 20 MG TABLET    Take 20 mg by mouth 2 (two) times a day    TOPIRAMATE (TOPAMAX) 100 MG TABLET    Take 1 tablet (100 mg total) by mouth 2 (two) times a day    TRIAMCINOLONE (KENALOG) 0 1 % CREAM    Apply topically 2 (two) times a day    VENLAFAXINE (EFFEXOR-XR) 75 MG 24 HR CAPSULE    Take 1 capsule by mouth in the morning   Modified Medications    No medications on file   Discontinued Medications    No medications on file       Allergies   Allergen Reactions    Percocet [Oxycodone-Acetaminophen] Headache     Severe headaches   (denies issues with Tylenol)    Betadine [Povidone Iodine] Rash     Unsure if betadine or gauze post surgical  Got rash on leg   Chlorhexidine Rash        /76   Pulse 67   Ht 5' 6" (1 676 m)   Wt 127 kg (279 lb)   BMI 45 03 kg/m²      REVIEW OF SYSTEMS:  Constitutional: Negative  HEENT: Negative  Respiratory: Negative  Skin: Negative  Neurological: Negative  Psychiatric/Behavioral: Negative  Musculoskeletal: Negative except for that mentioned in the HPI  PHYSICAL EXAM:  Left elbow: Surgical scar well healed no signs infection  Tenderness palpation on the medial epicondyle  Pain with resisted wrist flexion  ASSESSMENT AND PLAN:  49 y o  female  left elbow medial epicondylitis  Given the patient had previous surgical ulnar nerve decompression I am unsure whether there was a transposition done    Given this I would like her to have an ultrasound-guided medial epicondyle injection to prevent any injury to the ulnar nerve  The patient will follow-up on an as-needed basis

## 2022-04-07 ENCOUNTER — APPOINTMENT (OUTPATIENT)
Dept: PHYSICAL THERAPY | Facility: CLINIC | Age: 52
End: 2022-04-07
Payer: MEDICARE

## 2022-04-07 NOTE — TELEPHONE ENCOUNTER
Patient called in to see if Dr Chapincito Simmons put in the order yet  She kept saying it was entered wrong  I am not to sure what it needs to say , as it already says [address] [facility]  Dr Chapincito Simmons can we just please check and make sure this is the correct order? Thank you !     *Patient wished to be transferred to [facility]

## 2022-04-08 NOTE — TELEPHONE ENCOUNTER
Thank you for double checking ! Looks like patient is scheduled for the [facility] for 05-13  Thank you !

## 2022-04-12 ENCOUNTER — OFFICE VISIT (OUTPATIENT)
Dept: PHYSICAL THERAPY | Facility: CLINIC | Age: 52
End: 2022-04-12
Payer: MEDICARE

## 2022-04-12 DIAGNOSIS — Z96.651 HISTORY OF RIGHT KNEE JOINT REPLACEMENT: Primary | ICD-10-CM

## 2022-04-12 DIAGNOSIS — M25.561 RIGHT KNEE PAIN, UNSPECIFIED CHRONICITY: ICD-10-CM

## 2022-04-12 PROCEDURE — 97112 NEUROMUSCULAR REEDUCATION: CPT | Performed by: PHYSICAL THERAPIST

## 2022-04-12 PROCEDURE — 97110 THERAPEUTIC EXERCISES: CPT | Performed by: PHYSICAL THERAPIST

## 2022-04-12 PROCEDURE — 97140 MANUAL THERAPY 1/> REGIONS: CPT | Performed by: PHYSICAL THERAPIST

## 2022-04-12 NOTE — PROGRESS NOTES
Daily Note     Today's date: 2022  Patient name: Alana Palma  : 1970  MRN: [id number]  Referring provider: Nas Paul DO  Dx:   Encounter Diagnosis     ICD-10-CM    1  History of right knee joint replacement  Z96 651    2  Right knee pain, unspecified chronicity  M25 561        Start Time: 1223  Stop Time: 1301  Total time in clinic (min): 38 minutes    Subjective: Pt states she stopped using her SPC as of yesterday  She had 2 bad days last week of pain though has been much edmond this week  Objective: See treatment diary below      Assessment: Tolerated treatment well  Patient would benefit from continued PT  Patient tolerates added resistance of machines today well without complaints of pain  She has increased complaints of tightness into anterior knee with AAROM into flexion  Tolerates stretching well in without complaints  She continues to improve with function and gait is steady without AD  Plan: Progress treatment as tolerated         Precautions: hx self harm, hx colon CA, COPD    Manuals 3/21 3/24 3/29 3/31 4/5 4/12       Knee stretching  5' 10' 10' 10' 10' 10'                                              Neuro Re-Ed             Steamboats              SLS   10"x5          SLR flexion    2x10  2x10 2x10        SLR abduction     NV 2x10  2x10                                               Ther Ex             bike 5'  5' 5' 5' 5' 5'       Leg press     Leg press   50# 2x10  60#  2x10        Heel/toe raises              Knee ext machine LAQ NV LAQ 2x10 LAQ 2x10 4# 2x10 4# 2x10  11#x15       Ham curl machine  standing ham curls NV 2x10 2x10   11# x15       TB side step NV 2 laps no TB 2 laps  Red TB  x2 laps R TB x2  R TB x3 laps        Ther Activity             Step up and overs NV NV NV 4" x10  6" x10  Then 4" stairs x1 and 6" x2         Sit to stands from chair with foam NV NV x10                       Retro walks mariela              Heel slides     10x5"  10x5"  10x 5" Gait Training             Without AD    NV

## 2022-04-14 ENCOUNTER — APPOINTMENT (OUTPATIENT)
Dept: PHYSICAL THERAPY | Facility: CLINIC | Age: 52
End: 2022-04-14
Payer: MEDICARE

## 2022-04-18 ENCOUNTER — APPOINTMENT (OUTPATIENT)
Dept: PHYSICAL THERAPY | Facility: CLINIC | Age: 52
End: 2022-04-18
Payer: MEDICARE

## 2022-04-20 DIAGNOSIS — N95.1 VASOMOTOR SYMPTOMS DUE TO MENOPAUSE: Primary | ICD-10-CM

## 2022-04-20 RX ORDER — ESTRADIOL 0.5 MG/1
0.5 TABLET ORAL DAILY
Qty: 30 TABLET | Refills: 2 | Status: SHIPPED | OUTPATIENT
Start: 2022-04-20 | End: 2022-07-13

## 2022-04-20 NOTE — TELEPHONE ENCOUNTER
Patients  called regarding her estrogen med    The med comes pre packaged as 1mg   asked if provider can call to change dosing to the 1/2 mg ordered by [address] 2022 Susan, © [place] [address]  www [employer]  [address] [place] [address], Dali Yu    [phone]

## 2022-04-20 NOTE — TELEPHONE ENCOUNTER
Please sign off on presc for Estrace 0 5 mg dosage to [employer] [facility] - cancelled remaining rfs on Estrace 1 mg

## 2022-04-21 ENCOUNTER — APPOINTMENT (OUTPATIENT)
Dept: PHYSICAL THERAPY | Facility: CLINIC | Age: 52
End: 2022-04-21
Payer: MEDICARE

## 2022-04-22 ENCOUNTER — OFFICE VISIT (OUTPATIENT)
Dept: PHYSICAL THERAPY | Facility: CLINIC | Age: 52
End: 2022-04-22
Payer: MEDICARE

## 2022-04-22 DIAGNOSIS — Z96.651 HISTORY OF RIGHT KNEE JOINT REPLACEMENT: Primary | ICD-10-CM

## 2022-04-22 DIAGNOSIS — M25.561 RIGHT KNEE PAIN, UNSPECIFIED CHRONICITY: ICD-10-CM

## 2022-04-22 PROCEDURE — 97112 NEUROMUSCULAR REEDUCATION: CPT

## 2022-04-22 PROCEDURE — 97110 THERAPEUTIC EXERCISES: CPT

## 2022-04-22 PROCEDURE — 97140 MANUAL THERAPY 1/> REGIONS: CPT

## 2022-04-22 NOTE — PROGRESS NOTES
Daily Note     Today's date: 2022  Patient name: Jessi Kat  : 1970  MRN: [id number]  Referring provider: Cam Parisi DO  Dx:   Encounter Diagnosis     ICD-10-CM    1  History of right knee joint replacement  Z96 651    2  Right knee pain, unspecified chronicity  M25 561                   Subjective: Pt states her knee has been "stiff" lately  Objective: See treatment diary below      Assessment: Tolerated treatment well  Patient would benefit from continued PT  Occasional cues needed for proper technique w/ TE program   Increased ant knee discomfort noted w/ manual stretching; empty end feel  Tolerates strengthening exercises well; add resistance to SLR x 2 nv as susana  Plan: Progress treatment as tolerated           Precautions: hx self harm, hx colon CA, COPD     Manuals 3/21 3/24 3/29 3/31 4/5 4/12  4/22         Knee stretching  5' 10' 10' 10' 10' 10'  10'                                                                                 Neuro Re-Ed                       Steamboats                        SLS     10"x5                 SLR flexion       2x10  2x10 2x10   2x10  2# nv       SLR abduction        NV 2x10  2x10   2x10  2# Nv                                                                               Ther Ex                       bike 5'  5' 5' 5' 5' 5'  5'         Leg press        Leg press    50# 2x10  60#  2x10   60# 2x10         Heel/toe raises                        Knee ext machine LAQ NV LAQ 2x10 LAQ 2x10 4# 2x10 4# 2x10  11#x15  11# 2x10         Ham curl machine  standing ham curls NV 2x10 2x10     11# x15  11# 2x10         TB side step NV 2 laps no TB 2 laps  Red TB  x2 laps R TB x2  R TB x3 laps   RTB 2 laps         Ther Activity                       Step up and overs NV NV NV 4" x10  6" x10  Then 4" stairs x1 and 6" x2               Sit to stands from chair with foam NV NV x10                                         Retro walks mariela                      Heel slides        10x5"  10x5"  10x 5"  10x5"                                 Gait Training                       Without AD      NV

## 2022-04-26 ENCOUNTER — OFFICE VISIT (OUTPATIENT)
Dept: PHYSICAL THERAPY | Facility: CLINIC | Age: 52
End: 2022-04-26
Payer: MEDICARE

## 2022-04-26 DIAGNOSIS — Z96.651 HISTORY OF RIGHT KNEE JOINT REPLACEMENT: Primary | ICD-10-CM

## 2022-04-26 DIAGNOSIS — M25.561 RIGHT KNEE PAIN, UNSPECIFIED CHRONICITY: ICD-10-CM

## 2022-04-26 PROCEDURE — 97110 THERAPEUTIC EXERCISES: CPT | Performed by: PHYSICAL THERAPIST

## 2022-04-26 PROCEDURE — 97112 NEUROMUSCULAR REEDUCATION: CPT | Performed by: PHYSICAL THERAPIST

## 2022-04-26 PROCEDURE — 97140 MANUAL THERAPY 1/> REGIONS: CPT | Performed by: PHYSICAL THERAPIST

## 2022-04-26 NOTE — PROGRESS NOTES
Daily Note     Today's date: 2022  Patient name: Nixon Hannah  : 1970  MRN: [id number]  Referring provider: Leticia Hughes DO  Dx:   Encounter Diagnosis     ICD-10-CM    1  History of right knee joint replacement  Z96 651    2  Right knee pain, unspecified chronicity  M25 561                   Subjective: Patient reports her knee has been more painful and stiff over last couple of days causing her sig difficulty getting up steps  Objective: See treatment diary below      Assessment: Tolerated treatment well  Patient would benefit from continued PT  She has increased pain in knee with all exercises though is able to complete  More fatigue with LE strengthening exercises today, Mild swelling into LE noting, no redness  Pt continues to make progress  Plan: Progress treatment as tolerated  RE NV          Precautions: hx self harm, hx colon CA, COPD     Manuals 3/21 3/24 3/29 3/31 4/5 4/12  4/22  4/26       Knee stretching  5' 10' 10' 10' 10' 10'  10'  10'                                                                               Neuro Re-Ed                       Steamboats                        SLS     10"x5                 SLR flexion       2x10  2x10 2x10   2x10  2# nv       SLR abduction        NV 2x10  2x10   2x10  2# Nv                                                                               Ther Ex                       bike 5'  5' 5' 5' 5' 5'  5'  5'       Leg press        Leg press    50# 2x10  60#  2x10   60# 2x10  60# 2x10        Heel/toe raises                        Knee ext machine LAQ NV LAQ 2x10 LAQ 2x10 4# 2x10 4# 2x10  11#x15  11# 2x10 22# x10       Ham curl machine  standing ham curls NV 2x10 2x10     11# x15  11# 2x10  22# x10       TB side step NV 2 laps no TB 2 laps  Red TB  x2 laps R TB x2  R TB x3 laps   RTB 2 laps  G TB x2 laps        Ther Activity                       Step up and overs NV NV NV 4" x10  6" x10  Then 4" stairs x1 and 6" x2       6" xx10       Sit to stands from chair with foam NV NV x10                                         Retro walks mariela                 15# x10       Heel slides        10x5"  10x5"  10x 5"  10x5"  10x5"                               Gait Training                       Without AD      NV

## 2022-04-27 DIAGNOSIS — I10 HYPERTENSION, UNSPECIFIED TYPE: ICD-10-CM

## 2022-04-27 RX ORDER — FUROSEMIDE 20 MG/1
TABLET ORAL
Qty: 30 TABLET | Refills: 1 | Status: SHIPPED | OUTPATIENT
Start: 2022-04-27 | End: 2022-06-22

## 2022-04-28 ENCOUNTER — APPOINTMENT (OUTPATIENT)
Dept: PHYSICAL THERAPY | Facility: CLINIC | Age: 52
End: 2022-04-28
Payer: MEDICARE

## 2022-05-03 ENCOUNTER — APPOINTMENT (OUTPATIENT)
Dept: PHYSICAL THERAPY | Facility: CLINIC | Age: 52
End: 2022-05-03
Payer: MEDICARE

## 2022-05-03 ENCOUNTER — EVALUATION (OUTPATIENT)
Dept: PHYSICAL THERAPY | Facility: CLINIC | Age: 52
End: 2022-05-03
Payer: MEDICARE

## 2022-05-03 DIAGNOSIS — M25.561 RIGHT KNEE PAIN, UNSPECIFIED CHRONICITY: ICD-10-CM

## 2022-05-03 DIAGNOSIS — Z96.651 HISTORY OF RIGHT KNEE JOINT REPLACEMENT: Primary | ICD-10-CM

## 2022-05-03 PROCEDURE — 97112 NEUROMUSCULAR REEDUCATION: CPT | Performed by: PHYSICAL THERAPIST

## 2022-05-03 PROCEDURE — 97140 MANUAL THERAPY 1/> REGIONS: CPT | Performed by: PHYSICAL THERAPIST

## 2022-05-03 PROCEDURE — 97530 THERAPEUTIC ACTIVITIES: CPT | Performed by: PHYSICAL THERAPIST

## 2022-05-03 NOTE — PROGRESS NOTES
PT Re-Evaluation     Today's date: 5/3/2022  Patient name: Michoacano Payne  : 1970  MRN: [id number]  Referring provider: Chioma Kitchen DO  Dx:   Encounter Diagnosis     ICD-10-CM    1  History of right knee joint replacement  Z96 651    2  Right knee pain, unspecified chronicity  M25 561                   Assessment  Assessment details: Michoacano Payne is a 49 y o  female who presents s/p right TKR 22  She has demonstrated significant progression in knee ROM, gait, and function since her evaluation  She continues to have mild knee pain, functional mobility deficits, and slight end ROM deficits  Patient would benefit from continued physical therapy in order to address said deficits and improve functional mobility  Goals  STG:  Decrease pain 1 grade-met  Independent with basic HEP   Pt will transfer out of chair without UE assist-met    LTG:  Perform stairs reciprocally-met  Ambulate without use of AD -met  Decrease pain 2 grades -not met   Increase FOTO to expected score   Increase knee ROM to equal to contralateral side -mostly met       Plan  Patient would benefit from: skilled physical therapy  Planned therapy interventions: abdominal trunk stabilization, manual therapy, joint mobilization, activity modification, massage, balance, neuromuscular re-education, behavior modification, patient education, postural training, body mechanics training, strengthening, stretching, therapeutic exercise, therapeutic activities, functional ROM exercises, flexibility and home exercise program  Frequency: 2x week  Duration in weeks: 4  Treatment plan discussed with: patient        Subjective Evaluation    History of Present Illness  Mechanism of injury: Patient reports her knee has been better overall  She has been trying to do stairs reciprocally which has been easier  She is ambulating without her cane   She states her knee swells up a little bit every now and again but it isnt too bad lately  ------------------------------------------------------------------------------------------  S/p right TKR on 21  She had home PT for a few weeks, up until last week  She is sleeping in a hospital bed    She is ambulating with SPC for the most part and she started driving last week  She cannot stand too long at this time and notes she is using a shower chair to shower  She has mild difficulty getting on socks and shoes  She is is to return to work  and sees MD again tomorrow     Pain  Current pain rating: 3  At worst pain ratin    Patient Goals  Patient goal: unable to perform stairs reciproccally, stand for work > 1 hour         Objective     Active Range of Motion   Left Knee   Flexion: 128 degrees   Extension: 0 degrees     Right Knee   Flexion: 117 degrees   Extension: 3 degrees     Tests     Additional Tests Details                 Precautions: hx self harm, hx colon CA, COPD    Manuals 3/21 3/24 3/29 3/31 4/5 4/12  4/22  4/26  5/3 RE     Knee stretching  5' 10' 10' 10' 10' 10'  10'  10'  10'                                                                             Neuro Re-Ed                       Steamboats                        SLS     10"x5                 SLR flexion       2x10  2x10 2x10   2x10  2# nv  2# 2x10     SLR abduction        NV 2x10  2x10   2x10  2# Nv  2# 2x10                                                                              Ther Ex                       bike 5'  5' 5' 5' 5' 5'  5'  5'  5'     Leg press        Leg press    50# 2x10  60#  2x10   60# 2x10  60# 2x10   70# 2x10      Heel/toe raises                        Knee ext machine LAQ NV LAQ 2x10 LAQ 2x10 4# 2x10 4# 2x10  11#x15  11# 2x10 22# x10  22# x10     Ham curl machine  standing ham curls NV 2x10 2x10     11# x15  11# 2x10  22# x10  22# x10     TB side step NV 2 laps no TB 2 laps  Red TB  x2 laps R TB x2  R TB x3 laps   RTB 2 laps  G TB x2 laps   NV     Ther Activity                       Step up and overs NV NV NV 4" x10  6" x10  Then 4" stairs x1 and 6" x2       6" xx10       Sit to stands from chair with foam NV NV x10                                         Retro walks mariela                 15# x10  NV     Heel slides        10x5"  10x5"  10x 5"  10x5"  10x5"  10x5"                              Gait Training                       Without AD      NV

## 2022-05-03 NOTE — LETTER
May 3, 2022    Aristeo Adkins [place] [address] [address]  [facility]    Patient: Rima Lynn   YOB: 1970   Date of Visit: 5/3/2022     Encounter Diagnosis     ICD-10-CM    1  History of right knee joint replacement  Z96 651    2  Right knee pain, unspecified chronicity  M25 561        Dear Dr Celi Mackenzie: Thank you for your recent referral of Rima Lynn  Please review the attached evaluation summary from Blanca's recent visit  Please verify that you agree with the plan of care by signing the attached order  If you have any questions or concerns, please do not hesitate to call  I sincerely appreciate the opportunity to share in the care of one of your patients and hope to have another opportunity to work with you in the near future  Sincerely,    Rabia Catalan, PT      Referring Provider:      I certify that I have read the below Plan of Care and certify the need for these services furnished under this plan of treatment while under my care  Aristeo Adkins [place] [address] [address]  [address]  [address] [place] [address]  Via Fax: [phone]          PT Re-Evaluation     Today's date: 5/3/2022  Patient name: Rima Lynn  : 1970  MRN: [id number]  Referring provider: Samira Roe DO  Dx:   Encounter Diagnosis     ICD-10-CM    1  History of right knee joint replacement  Z96 651    2  Right knee pain, unspecified chronicity  M25 561                   Assessment  Assessment details: Rima Lynn is a 49 y o  female who presents s/p right TKR 22  She has demonstrated significant progression in knee ROM, gait, and function since her evaluation  She continues to have mild knee pain, functional mobility deficits, and slight end ROM deficits  Patient would benefit from continued physical therapy in order to address said deficits and improve functional mobility       Goals  STG:  Decrease pain 1 grade-met  Independent with basic HEP   Pt will transfer out of chair without UE assist-met    LTG:  Perform stairs reciprocally-met  Ambulate without use of AD -met  Decrease pain 2 grades -not met   Increase FOTO to expected score   Increase knee ROM to equal to contralateral side -mostly met       Plan  Patient would benefit from: skilled physical therapy  Planned therapy interventions: abdominal trunk stabilization, manual therapy, joint mobilization, activity modification, massage, balance, neuromuscular re-education, behavior modification, patient education, postural training, body mechanics training, strengthening, stretching, therapeutic exercise, therapeutic activities, functional ROM exercises, flexibility and home exercise program  Frequency: 2x week  Duration in weeks: 4  Treatment plan discussed with: patient        Subjective Evaluation    History of Present Illness  Mechanism of injury: Patient reports her knee has been better overall  She has been trying to do stairs reciprocally which has been easier  She is ambulating without her cane  She states her knee swells up a little bit every now and again but it isnt too bad lately  ------------------------------------------------------------------------------------------  S/p right TKR on 21  She had home PT for a few weeks, up until last week  She is sleeping in a hospital bed    She is ambulating with SPC for the most part and she started driving last week  She cannot stand too long at this time and notes she is using a shower chair to shower  She has mild difficulty getting on socks and shoes  She is is to return to work  and sees MD again tomorrow     Pain  Current pain rating: 3  At worst pain ratin    Patient Goals  Patient goal: unable to perform stairs reciproccally, stand for work > 1 hour         Objective     Active Range of Motion   Left Knee   Flexion: 128 degrees   Extension: 0 degrees     Right Knee   Flexion: 117 degrees   Extension: 3 degrees Tests     Additional Tests Details                 Precautions: hx self harm, hx colon CA, COPD    Manuals 3/21 3/24 3/29 3/31 4/5 4/12  4/22  4/26  5/3 RE     Knee stretching  5' 10' 10' 10' 10' 10'  10'  10'  10'                                                                             Neuro Re-Ed                       Steamboats                        SLS     10"x5                 SLR flexion       2x10  2x10 2x10   2x10  2# nv  2# 2x10     SLR abduction        NV 2x10  2x10   2x10  2# Nv  2# 2x10                                                                              Ther Ex                       bike 5'  5' 5' 5' 5' 5'  5'  5'  5'     Leg press        Leg press    50# 2x10  60#  2x10   60# 2x10  60# 2x10   70# 2x10      Heel/toe raises                        Knee ext machine LAQ NV LAQ 2x10 LAQ 2x10 4# 2x10 4# 2x10  11#x15  11# 2x10 22# x10  22# x10     Ham curl machine  standing ham curls NV 2x10 2x10     11# x15  11# 2x10  22# x10  22# x10     TB side step NV 2 laps no TB 2 laps  Red TB  x2 laps R TB x2  R TB x3 laps   RTB 2 laps  G TB x2 laps   NV     Ther Activity                       Step up and overs NV NV NV 4" x10  6" x10  Then 4" stairs x1 and 6" x2       6" xx10       Sit to stands from chair with foam NV NV x10                                         Retro walks mariela                 15# x10  NV     Heel slides        10x5"  10x5"  10x 5"  10x5"  10x5"  10x5"                              Gait Training                       Without AD      NV

## 2022-05-05 ENCOUNTER — APPOINTMENT (OUTPATIENT)
Dept: PHYSICAL THERAPY | Facility: CLINIC | Age: 52
End: 2022-05-05
Payer: MEDICARE

## 2022-05-10 ENCOUNTER — OFFICE VISIT (OUTPATIENT)
Dept: FAMILY MEDICINE CLINIC | Facility: CLINIC | Age: 52
End: 2022-05-10
Payer: MEDICARE

## 2022-05-10 ENCOUNTER — OFFICE VISIT (OUTPATIENT)
Dept: PHYSICAL THERAPY | Facility: CLINIC | Age: 52
End: 2022-05-10
Payer: MEDICARE

## 2022-05-10 ENCOUNTER — TELEPHONE (OUTPATIENT)
Dept: OBGYN CLINIC | Facility: HOSPITAL | Age: 52
End: 2022-05-10

## 2022-05-10 VITALS
BODY MASS INDEX: 46 KG/M2 | SYSTOLIC BLOOD PRESSURE: 134 MMHG | DIASTOLIC BLOOD PRESSURE: 76 MMHG | RESPIRATION RATE: 18 BRPM | WEIGHT: 286.2 LBS | OXYGEN SATURATION: 98 % | HEART RATE: 89 BPM | TEMPERATURE: 97.4 F | HEIGHT: 66 IN

## 2022-05-10 DIAGNOSIS — H01.119 EYELID DERMATITIS, ALLERGIC/CONTACT: ICD-10-CM

## 2022-05-10 DIAGNOSIS — M25.561 RIGHT KNEE PAIN, UNSPECIFIED CHRONICITY: ICD-10-CM

## 2022-05-10 DIAGNOSIS — Z96.651 HISTORY OF RIGHT KNEE JOINT REPLACEMENT: Primary | ICD-10-CM

## 2022-05-10 DIAGNOSIS — M67.442 GANGLION CYST OF FINGER OF LEFT HAND: Primary | ICD-10-CM

## 2022-05-10 PROCEDURE — 99213 OFFICE O/P EST LOW 20 MIN: CPT | Performed by: NURSE PRACTITIONER

## 2022-05-10 PROCEDURE — 97112 NEUROMUSCULAR REEDUCATION: CPT | Performed by: PHYSICAL THERAPIST

## 2022-05-10 PROCEDURE — 97140 MANUAL THERAPY 1/> REGIONS: CPT | Performed by: PHYSICAL THERAPIST

## 2022-05-10 PROCEDURE — 97110 THERAPEUTIC EXERCISES: CPT | Performed by: PHYSICAL THERAPIST

## 2022-05-10 NOTE — LETTER
May 10, 2022     Patient: Heavenly Eden  YOB: 1970  Date of Visit: 5/10/2022      To Whom it May Concern:    Jacqueline American is under my professional care  Dylan Hamlin was seen in my office on 5/10/2022  Dylan Hamlin may return to work on 5/11/22  If you have any questions or concerns, please don't hesitate to call           Sincerely,          Elena Mention

## 2022-05-10 NOTE — PROGRESS NOTES
Daily Note     Today's date: 5/10/2022  Patient name: Mini Lopez  : 1970  MRN: [id number]  Referring provider: Neeraj Mishra DO  Dx:   Encounter Diagnosis     ICD-10-CM    1  History of right knee joint replacement  Z96 651    2  Right knee pain, unspecified chronicity  M25 561                   Subjective: Patient reports her leg keeps swelling up when its down but when she props it up it goes down quickly  Objective: See treatment diary below      Assessment: Tolerated treatment well  Patient would benefit from continued PT  She has improved tolerance to exercises and is more compliant today  She has good form with activity though tenderness at end ROM of knee flexion  Progress functional activities as tolerated  Plan: Progress treatment as tolerated              Precautions: hx self harm, hx colon CA, COPD     Manuals 3/29 3/31 4/5 4/12  4/22  4/26  5/3 RE 5/10    Knee stretching  10' 10' 10' 10'  10'  10'  10'  10'                                                               Neuro Re-Ed                   Steamboats                    SLS 10"x5                 SLR flexion   2x10  2x10 2x10   2x10  2# nv  2# 2x10  2# 2x10   SLR abduction    NV 2x10  2x10   2x10  2# Nv  2# 2x10   2# 2x10                                                                Ther Ex                   bike 11' 5' 5' 5'  5'  5'  5'  5' lvl 4    Leg press    Leg press    50# 2x10  60#  2x10   60# 2x10  60# 2x10   70# 2x10   70# 20x    Heel/toe raises                    Knee ext machine LAQ 2x10 4# 2x10 4# 2x10  11#x15  11# 2x10 22# x10  22# x10  22# 2x10   Ham curl machine  2x10     11# x15  11# 2x10  22# x10  22# x10  22# 20x    bridge with band         G 2x10   Clamshells         G 2x10               TB side step 2 laps  Red TB  x2 laps R TB x2  R TB x3 laps   RTB 2 laps  G TB x2 laps   NV  G Tb 3 laps    Ther Activity                   Step up and overs NV 4" x10  6" x10  Then 4" stairs x1 and 6" x2       6" xx10       Sit to stands from chair with foam x10                                     Retro walks mariela             15# x10  NV     Heel slides    10x5"  10x5"  10x 5"  10x5"  10x5"  10x5"   10x5"                       Gait Training                   Without AD  NV

## 2022-05-10 NOTE — TELEPHONE ENCOUNTER
Hello,    Please advise if the following patient can be forced onto the schedule:    Patient:  Sofiya Parra 27  : 1970  MRN:  [id number]  Call back: [phone]  Reason for appointment:     ganglion cyst of left hand  Requested doctor/location: PCP advised to see Hand Surgeon        EMAILED [facility]

## 2022-05-12 ENCOUNTER — OFFICE VISIT (OUTPATIENT)
Dept: PHYSICAL THERAPY | Facility: CLINIC | Age: 52
End: 2022-05-12
Payer: MEDICARE

## 2022-05-12 DIAGNOSIS — Z96.651 HISTORY OF RIGHT KNEE JOINT REPLACEMENT: Primary | ICD-10-CM

## 2022-05-12 DIAGNOSIS — M25.561 RIGHT KNEE PAIN, UNSPECIFIED CHRONICITY: ICD-10-CM

## 2022-05-12 PROCEDURE — 97140 MANUAL THERAPY 1/> REGIONS: CPT

## 2022-05-12 PROCEDURE — 97112 NEUROMUSCULAR REEDUCATION: CPT

## 2022-05-12 PROCEDURE — 97110 THERAPEUTIC EXERCISES: CPT

## 2022-05-12 NOTE — PROGRESS NOTES
Daily Note     Today's date: 2022  Patient name: Roseline Louis  : 1970  MRN: [id number]  Referring provider: Althea Linn DO  Dx:   Encounter Diagnosis     ICD-10-CM    1  History of right knee joint replacement  Z96 651    2  Right knee pain, unspecified chronicity  M25 561                   Subjective: Pt states she is "getting better" at negotiating stairs  Pt reports the last few days her knee has been feeling "less stiff"  Objective: See treatment diary below      Assessment: Tolerated treatment well  Patient would benefit from continued PT  Improved endurance noted w/ TE program   Min cues needed for proper technique  Mild discomfort noted w/ manual knee flexion stretching  Plan: Progress treatment as tolerated             Precautions: hx self harm, hx colon CA, COPD     Manuals 5/12       5/3 RE 10    Knee stretching  10'       10'  10'                                                Neuro Re-Ed              Steamboats               SLS             SLR flexion  2# 2x10       2# 2x10  2# 2x10   SLR abduction   2# 2x10       2# 2x10   2# 2x10                                                                Ther Ex                   bike 11'       5'  5' lvl 4    Leg press   70# x20       70# 2x10   70# 20x    Heel/toe raises                    Knee ext machine 22#  2x10       22# x10  22# 2x10   Ham curl machine  22# 2x10       22# x10  22# 20x    bridge with band   GTB 2x10        G 2x10   Clamshells   GTB 2x10             G 2x10                        TB side step GTB 2 laps        NV  G Tb 3 laps    Ther Activity                   Step up and overs         6" xx10       Sit to stands from chair with foam                                      Retro walks mariela  15# x10      15# x10  NV     Heel slides   10x5"      10x5"  10x5"   10x5"                       Gait Training                   Without AD

## 2022-05-13 ENCOUNTER — HOSPITAL ENCOUNTER (OUTPATIENT)
Dept: RADIOLOGY | Facility: HOSPITAL | Age: 52
Discharge: HOME | End: 2022-05-13
Attending: ORTHOPAEDIC SURGERY
Payer: MEDICARE

## 2022-05-13 DIAGNOSIS — M77.02 MEDIAL EPICONDYLITIS, LEFT ELBOW: ICD-10-CM

## 2022-05-13 PROCEDURE — 20551 NJX 1 TENDON ORIGIN/INSJ: CPT

## 2022-05-13 RX ORDER — METHYLPREDNISOLONE ACETATE 80 MG/ML
40 INJECTION, SUSPENSION INTRA-ARTICULAR; INTRALESIONAL; INTRAMUSCULAR; SOFT TISSUE ONCE
Status: COMPLETED | OUTPATIENT
Start: 2022-05-13 | End: 2022-05-13

## 2022-05-13 RX ORDER — LIDOCAINE HYDROCHLORIDE 10 MG/ML
2 INJECTION, SOLUTION EPIDURAL; INFILTRATION; INTRACAUDAL; PERINEURAL ONCE
Status: COMPLETED | OUTPATIENT
Start: 2022-05-13 | End: 2022-05-13

## 2022-05-13 RX ADMIN — METHYLPREDNISOLONE ACETATE 40 MG: 80 INJECTION, SUSPENSION INTRA-ARTICULAR; INTRALESIONAL; INTRAMUSCULAR; SOFT TISSUE at 14:36

## 2022-05-13 RX ADMIN — LIDOCAINE HYDROCHLORIDE 2 ML: 10 INJECTION, SOLUTION EPIDURAL; INFILTRATION; INTRACAUDAL; PERINEURAL at 14:36

## 2022-05-16 ENCOUNTER — OFFICE VISIT (OUTPATIENT)
Dept: PHYSICAL THERAPY | Facility: CLINIC | Age: 52
End: 2022-05-16
Payer: MEDICARE

## 2022-05-16 DIAGNOSIS — M25.561 RIGHT KNEE PAIN, UNSPECIFIED CHRONICITY: ICD-10-CM

## 2022-05-16 DIAGNOSIS — Z96.651 HISTORY OF RIGHT KNEE JOINT REPLACEMENT: Primary | ICD-10-CM

## 2022-05-16 PROCEDURE — 97140 MANUAL THERAPY 1/> REGIONS: CPT

## 2022-05-16 PROCEDURE — 97110 THERAPEUTIC EXERCISES: CPT

## 2022-05-16 PROCEDURE — 97112 NEUROMUSCULAR REEDUCATION: CPT

## 2022-05-16 NOTE — PROGRESS NOTES
Daily Note     Today's date: 2022  Patient name: Rashmi Barry  : 1970  MRN: [id number]  Referring provider: Cassandra Kwan DO  Dx:   Encounter Diagnosis     ICD-10-CM    1  History of right knee joint replacement  Z96 651    2  Right knee pain, unspecified chronicity  M25 561                   Subjective: Pt feels she is ready to be discharged after nv  Objective: See treatment diary below      Assessment: Tolerated treatment well  Patient would benefit from continued PT  Fatigued by resisted SLR and hip abduction  Min cues needed for proper technique  Mild discomfort end range knee flexion  Plan: Progress treatment as tolerated             Precautions: hx self harm, hx colon CA, COPD     Manuals           53     Knee stretching  10'  10'          10'  10'                                                               Neuro Re-Ed                   Steamboats                    SLS                   SLR flexion  2# 2x10  2# 2x10          2# 2x10  2# 2x10   SLR abduction   2# 2x10  2# 2x10          2# 2x10   2# 2x10                                                                Ther Ex                   bike 11' 5'          5'  5' lvl 4    Leg press   70# x20  70# 2x10          70# 2x10   70# 20x    Heel/toe raises                    Knee ext machine 22#  2x10  22# 2x10          22# x10  22# 2x10   Ham curl machine  22# 2x10  22# 2x10          22# x10  22# 20x    bridge with band   GTB 2x10  GTB 2x10           G 2x10   Clamshells   GTB 2x10  GTB 2x10           G 2x10                        TB side step GTB 2 laps   GTB 2 laps          NV  G Tb 3 laps    Ther Activity                   Step up and overs            6" xx10       Sit to stands from chair with foam                                       Retro walks mariela  15# x10  15# x10        15# x10  NV     Heel slides   10x5"  10x5"        10x5"  10x5"   10x5"                       Gait Training                 Without AD

## 2022-05-18 DIAGNOSIS — I10 HYPERTENSION, UNSPECIFIED TYPE: ICD-10-CM

## 2022-05-18 RX ORDER — AMLODIPINE BESYLATE 5 MG/1
5 TABLET ORAL DAILY
Qty: 30 TABLET | Refills: 2 | Status: SHIPPED | OUTPATIENT
Start: 2022-05-18 | End: 2022-08-10

## 2022-05-19 ENCOUNTER — OFFICE VISIT (OUTPATIENT)
Dept: PHYSICAL THERAPY | Facility: CLINIC | Age: 52
End: 2022-05-19
Payer: MEDICARE

## 2022-05-19 DIAGNOSIS — Z96.651 HISTORY OF RIGHT KNEE JOINT REPLACEMENT: Primary | ICD-10-CM

## 2022-05-19 PROCEDURE — 97110 THERAPEUTIC EXERCISES: CPT

## 2022-05-19 PROCEDURE — 97140 MANUAL THERAPY 1/> REGIONS: CPT

## 2022-05-19 PROCEDURE — 97112 NEUROMUSCULAR REEDUCATION: CPT

## 2022-05-19 NOTE — PROGRESS NOTES
Daily Note     Today's date: 2022  Patient name: Bhupendra Soto  : 1970  MRN: [id number]  Referring provider: Yakov Mcghee DO  Dx:   Encounter Diagnosis     ICD-10-CM    1  History of right knee joint replacement  Z96 651                   Subjective: Pt states she only has occasional discomfort, which she states is not intense enough to pain meds for  Also reports her knee was bending "really well"       Objective: See treatment diary below      Assessment: Tolerated treatment well  Patient exhibited good technique with therapeutic exercises   Good tolerance to manual stretching, TE  No increased sx noted w/ tx  Plan: DC to HEP  Pt has MDV on          Precautions: hx self harm, hx colon CA, COPD     Manuals 5/12 5/16 5/19        5/3 RE 10    Knee stretching  10'  10' 10'        10'  10'                                                               Neuro Re-Ed                   Steamboats                    SLS                   SLR flexion  2# 2x10  2# 2x10  2# 2x10        2# 2x10  2# 2x10   SLR abduction   2# 2x10  2# 2x10  2# 2x10        2# 2x10   2# 2x10                                                                Ther Ex                   bike 11' 5'  5'        5'  5' lvl 4    Leg press   70# x20  70# 2x10  70# 2x10        70# 2x10   70# 20x    Heel/toe raises                    Knee ext machine 22#  2x10  22# 2x10  22# 2x10        22# x10  22# 2x10   Ham curl machine  22# 2x10  22# 2x10  22# 2x10        22# x10  22# 20x    bridge with band   GTB 2x10  GTB 2x10  GTB 2x10         G 2x10   Clamshells   GTB 2x10  GTB 2x10  GTB 2x10         G 2x10                        TB side step GTB 2 laps   GTB 2 laps  GTB 2 laps        NV  G Tb 3 laps    Ther Activity                   Step up and overs            6" xx10       Sit to stands from chair with foam                                       Retro walks mariela  15# x10  15# x10  15# x10      15# x10  NV     Heel slides   10x5"  10x5"      10x5"  10x5"   10x5"                       Gait Training                   Without AD

## 2022-05-21 NOTE — PROGRESS NOTES
Treatment Plan Meeting:  Diagnosis of Schizoaffective disorder, bipolar type, marijuana abuse, anxiety, & borderline personality disorder reviewed  Discussed short term goals for decrease in depression, anxiety, suicidal thoughts, decrease in self abusive behaviors, improvement in insight, sleep improvement, & improvement in appetite  Pt works with [employer], but is not happy with them presently, stating they are not seeing her as they should be  She is also focused on d/c to return to work  At this time, no discharge date is set  All parties in agreement & treatment plan was signed        01/07/22 0815   Team Meeting   Meeting Type Tx Team Meeting   Initial Conference Date 01/07/22   Next Conference Date 02/02/22   Team Members Present   Team Members Present Physician;Nurse;   Physician Team Member Dr Silvia Hodgson Team Member [facility] Team Member Jelani   Patient/Family Present   Patient Present No  (Pt declined to meet with Tx Team)   Patient's Family Present No Stable

## 2022-05-24 DIAGNOSIS — I10 HYPERTENSION, UNSPECIFIED TYPE: ICD-10-CM

## 2022-05-24 RX ORDER — LOSARTAN POTASSIUM 50 MG/1
TABLET ORAL
Qty: 30 TABLET | Refills: 10 | Status: SHIPPED | OUTPATIENT
Start: 2022-05-24

## 2022-05-25 ENCOUNTER — LAB (OUTPATIENT)
Dept: LAB | Age: 52
End: 2022-05-25
Payer: MEDICARE

## 2022-05-25 ENCOUNTER — HOSPITAL ENCOUNTER (OUTPATIENT)
Dept: RADIOLOGY | Age: 52
Discharge: HOME | End: 2022-05-25
Payer: MEDICARE

## 2022-05-25 VITALS — HEIGHT: 66 IN | WEIGHT: 286.2 LBS | BODY MASS INDEX: 46 KG/M2

## 2022-05-25 DIAGNOSIS — Z12.31 ENCOUNTER FOR SCREENING MAMMOGRAM FOR MALIGNANT NEOPLASM OF BREAST: ICD-10-CM

## 2022-05-25 DIAGNOSIS — Z12.31 BREAST CANCER SCREENING BY MAMMOGRAM: ICD-10-CM

## 2022-05-25 DIAGNOSIS — E03.9 HYPOTHYROIDISM: ICD-10-CM

## 2022-05-25 LAB
T4 FREE SERPL-MCNC: 0.9 NG/DL (ref 0.76–1.46)
TSH SERPL DL<=0.05 MIU/L-ACNC: 8.29 UIU/ML (ref 0.45–4.5)

## 2022-05-25 PROCEDURE — 36415 COLL VENOUS BLD VENIPUNCTURE: CPT

## 2022-05-25 PROCEDURE — 84443 ASSAY THYROID STIM HORMONE: CPT

## 2022-05-25 PROCEDURE — 84439 ASSAY OF FREE THYROXINE: CPT

## 2022-05-26 DIAGNOSIS — E03.9 HYPOTHYROIDISM: ICD-10-CM

## 2022-05-26 RX ORDER — CETIRIZINE HYDROCHLORIDE 10 MG/1
TABLET ORAL
COMMUNITY
Start: 2022-05-13 | End: 2022-09-08 | Stop reason: ALTCHOICE

## 2022-05-26 RX ORDER — LEVOTHYROXINE SODIUM 0.1 MG/1
100 TABLET ORAL
Qty: 90 TABLET | Refills: 1 | Status: SHIPPED | OUTPATIENT
Start: 2022-05-26 | End: 2022-08-04 | Stop reason: SDUPTHER

## 2022-05-26 RX ORDER — ROPINIROLE 0.5 MG/1
TABLET, FILM COATED ORAL
COMMUNITY
Start: 2022-05-25 | End: 2023-09-03

## 2022-05-26 RX ORDER — OLOPATADINE HYDROCHLORIDE 1 MG/ML
SOLUTION/ DROPS OPHTHALMIC
COMMUNITY
Start: 2022-05-13 | End: 2022-07-26

## 2022-05-26 RX ORDER — DESONIDE 0.5 MG/G
OINTMENT TOPICAL
COMMUNITY
Start: 2022-05-13 | End: 2022-07-15

## 2022-05-26 RX ORDER — VENLAFAXINE 37.5 MG/1
37.5 TABLET ORAL DAILY
COMMUNITY
Start: 2022-05-25 | End: 2022-07-15

## 2022-05-26 NOTE — RESULT ENCOUNTER NOTE
Advise need to increase levothyroxine from 88 to 100 mcg    I sent new dose to pharmacy  repeat the TSH in 6-8 weeks

## 2022-05-31 ENCOUNTER — HOSPITAL ENCOUNTER (OUTPATIENT)
Dept: MAMMOGRAPHY | Facility: CLINIC | Age: 52
Discharge: HOME | End: 2022-05-31
Payer: MEDICARE

## 2022-05-31 VITALS — HEIGHT: 66 IN | BODY MASS INDEX: 45.96 KG/M2 | WEIGHT: 286 LBS

## 2022-05-31 DIAGNOSIS — N64.4 BREAST PAIN: ICD-10-CM

## 2022-05-31 PROCEDURE — G0279 TOMOSYNTHESIS, MAMMO: HCPCS

## 2022-05-31 PROCEDURE — 77066 DX MAMMO INCL CAD BI: CPT

## 2022-05-31 PROCEDURE — 76642 ULTRASOUND BREAST LIMITED: CPT

## 2022-06-12 ENCOUNTER — HOSPITAL ENCOUNTER (OUTPATIENT)
Dept: SLEEP CENTER | Facility: CLINIC | Age: 52
Discharge: HOME | End: 2022-06-12
Payer: MEDICARE

## 2022-06-12 DIAGNOSIS — G47.33 OSA (OBSTRUCTIVE SLEEP APNEA): ICD-10-CM

## 2022-06-12 PROCEDURE — 95811 POLYSOM 6/>YRS CPAP 4/> PARM: CPT | Performed by: INTERNAL MEDICINE

## 2022-06-12 PROCEDURE — 95811 POLYSOM 6/>YRS CPAP 4/> PARM: CPT

## 2022-06-13 ENCOUNTER — OFFICE VISIT (OUTPATIENT)
Dept: OBGYN CLINIC | Facility: HOSPITAL | Age: 52
End: 2022-06-13
Payer: MEDICARE

## 2022-06-13 VITALS
SYSTOLIC BLOOD PRESSURE: 121 MMHG | HEART RATE: 69 BPM | RESPIRATION RATE: 17 BRPM | HEIGHT: 66 IN | DIASTOLIC BLOOD PRESSURE: 78 MMHG | WEIGHT: 285 LBS | BODY MASS INDEX: 45.8 KG/M2

## 2022-06-13 DIAGNOSIS — M67.442 GANGLION CYST OF FINGER OF LEFT HAND: ICD-10-CM

## 2022-06-13 DIAGNOSIS — G47.33 OSA (OBSTRUCTIVE SLEEP APNEA): Primary | ICD-10-CM

## 2022-06-13 DIAGNOSIS — M77.02 MEDIAL EPICONDYLITIS OF ELBOW, LEFT: Primary | ICD-10-CM

## 2022-06-13 PROCEDURE — 99215 OFFICE O/P EST HI 40 MIN: CPT | Performed by: SURGERY

## 2022-06-13 RX ORDER — CHLORHEXIDINE GLUCONATE 0.12 MG/ML
15 RINSE ORAL ONCE
OUTPATIENT
Start: 2022-06-13 | End: 2022-06-13

## 2022-06-13 RX ORDER — CEFAZOLIN SODIUM 2 G/50ML
2000 SOLUTION INTRAVENOUS ONCE
OUTPATIENT
Start: 2022-06-13 | End: 2022-06-13

## 2022-06-13 NOTE — H&P
ASSESSMENT/PLAN:      50 yo female with medial epicondylitis   Reviewed diagnosis with the patient along with treatment options  At this point she has tried therapy and injections as well as bracing all of which have helped intermittently but not provided lasting relief  We did review her MRI from 2021 which was negative for any lateral or medial epicondylitis, however do with this is worth repeating prior to proceeding with surgery  We discussed a medial epicondylectomy along with risks benefits and alternatives  Patient would like to proceed and did sign informed consent today  Plan to review MRI prior to surgery  We discussed postop protocol including extended splinting and continued OT  Follow up after surgery    The patient verbalized understanding of exam findings and treatment plan  We engaged in the shared decision-making process and treatment options were discussed at length with the patient  Surgical and conservative management discussed today along with risks and benefits  Diagnoses and all orders for this visit:    Medial epicondylitis of elbow, left  -     MRI elbow left wo contrast; Future  -     Case request operating room: RELEASE EPICONDYLAR ELBOW MEDIAL; Standing  -     Case request operating room: RELEASE EPICONDYLAR ELBOW MEDIAL    Ganglion cyst of finger of left hand  -     Ambulatory Referral to Hand Surgery    Other orders  -     Nursing Communication Warmimg Interventions Implemented; Standing  -     Nursing Communication CHG bath, have staff wash entire body (neck down) per pre-op bathing protocol  Routine, evening prior to, and day of surgery ; Standing  -     Nursing Communication Swab both nares with Povidone-Iodine solution, EXCLUDE if patient has shellfish/Iodine allergy  Routine, day of surgery, on call to OR; Standing  -     chlorhexidine (PERIDEX) 0 12 % oral rinse 15 mL  -     Void on call to OR; Standing  -     Insert peripheral IV;  Standing  -     Diet NPO; Sips with meds; Standing  -     ceFAZolin (ANCEF) IVPB (premix in dextrose) 2,000 mg 50 mL  -     Apply Sequential Compression Device; Standing        Medial Epicondylitis Release: The anatomy and physiology of medial epicondylitis was discussed with the patient today  Typically, degenerative changes in the flexor pronator mass occur over time  These degenerative changes appear as tears of the tendon on MRI  This creates pain over the medial epicondyle  This pain typically is made worse with palm up lifting activities as well as anything that involves strength and stability of the wrist   The pain may radiate from the wrist up to the elbow  At times, the shoulder may be weak as well which can predispose or cause continuation of the problem  Conservative treatment usually cures a vast majority of patients; however, this may take up to 6-9 months  Conservative treatment options typically include activity modification, therapy for strengthening of the shoulder and elbow, tennis elbow strap, and possible corticosteroid injections  Corticosteroid injections are very effective at relieving pain but do not alter the natural history of this process  Rather, steroid injections decrease the pain temporarily to allow for therapy to take place without discomfort  It was discussed that therapy to prevent recurrence is a "life long" process and that if patient relies on the steroid injection alone without performing therapeutic exercises the risk of recurrence is likely  Typical home regimen includes heat,  stretching and resisted wrist flex ion and forearm rotation exercises discussed in the office  Surgery is required in fewer than 5% of patients  At times, the ulnar nerve may be aggravated with this condition  The patient has elected surgical release of the medical epicondylitis  The risks and benefits of the procedure were explained to the patient, which include, but are not limited to: Bleeding, infection, recurrence, pain, scar, damage to tendons, damage to nerves, and damage to blood vessels, failure to give desired results and complications related to anesthesia  These risks, along with alternative conservative treatment options, and postoperative protocols were voiced back and understood by the patient  All questions were answered to the patient's satisfaction  The patient agrees to comply with a standard postoperative protocol, and is willing to proceed  Education was provided via written and auditory forms  There were no barriers to learning  Written handouts regarding wound care, incision and scar care, and general preoperative information was provided to the patient  Prior to surgery, the patient may be requested to stop all anti-inflammatory medications  Prophylactic aspirin, Plavix, and Coumadin may be allowed to be continued  Medications including vitamin E , ginkgo, and fish oil are requested to be stopped approximately one week prior to surgery  Hypertensive medications and beta blockers, if taken, should be continued  Follow Up:  Return for After Surgery  To Do Next Visit:  Re-evaluation of current issue    ____________________________________________________________________________________________________________________________________________      CHIEF COMPLAINT:  Chief Complaint   Patient presents with    Right Elbow - Follow-up    Left Elbow - Pain, Follow-up       SUBJECTIVE:  Luna Campbell is a 49y o  year old RHD female who presents for follow up of chronic left elbow pian  She was seen by us in January for lateral epicondylitis and then again by Dr Jorge Woo for medial epicondylitis  She recently under went an injection at the medial epicondyle which helped her pain for about a month  She continues to have medial elbow pain which inhibits her daily tasks  No paresthesias  She does have hx of cubital tunnel release without transposition          I have personally reviewed all the relevant PMH, PSH, SH, FH, Medications and allergies  PAST MEDICAL HISTORY:  Past Medical History:   Diagnosis Date    Anxiety     Arthritis     oa cassandra knees    Asthma     good control- no medications    Yan's esophagus     Bipolar affective disorder (HCC)     Chronic pain disorder     lumbar    CPAP (continuous positive airway pressure) dependence     Degenerative disc disease at L5-S1 level     Deliberate self-cutting     Depression 09/16/2008    Disease of thyroid gland     hypo    MARTINEZ (dyspnea on exertion)     Drug overdose 10/28/2008    suicide attempt    Dysphagia     Dyspnea     Edema     BLE    GERD (gastroesophageal reflux disease)     High blood pressure     History of COVID-19 12/30/2020    Symptoms started on 12/30/2020 and then got worse  Today she is feeling a little bit better  She is a candidate for monoclonal antibody infusion and set for 1/6/21 @ 1pm at [facility]  01/07/21 - telemedicine -     Hypertension     Knee pain, bilateral     Especially right    Migraines     Obese     Overactive bladder     Sjogren's disease ([address] )     Sleep apnea     Stress incontinence     Suicidal ideations     Umbilical hernia     surgical repair today 4/24/2019    Use of cane as ambulatory aid     awaiting b/l knee replacement    Wears glasses        PAST SURGICAL HISTORY:  Past Surgical History:   Procedure Laterality Date    BREAST CYST EXCISION Right 1989    CARPAL TUNNEL RELEASE Left     CHOLECYSTECTOMY  05/2003    Laparoscopic    COLONOSCOPY      01/12/2009    DILATION AND CURETTAGE OF UTERUS      ELBOW SURGERY Left     x2   No hardware    ESOPHAGOGASTRODUODENOSCOPY      FOOT SURGERY Left     Plantar fasciotomy    HYSTERECTOMY      laporoscopic, partial    KNEE ARTHROSCOPY Bilateral     OOPHORECTOMY Left 10/2015    UT ANAL SPHINCTEROTOMY N/A 08/31/2018    Procedure: EUA, LEFT PARTIAL INTERNAL SPHINCTEROTOMY;  Surgeon: Tonya Garcia MD;  Location: [address] [place] [address];  Service: Colorectal  FL GASTROCNEMIUS RECESSION Left 2021    Procedure: RECESSION GASTROC OPEN;  Surgeon: Johnathan Conway DPM;  Location: [address] [place] [address] MAIN OR;  Service: [facility] 38 LQVE,8+Q/E,WKKMY N/A 2019    Procedure: REPAIR HERNIA UMBILICAL LAPAROSCOPIC W/ ROBOTICS;  Surgeon: Annie Nino MD;  Location: [facility] Main OR;  Service: General    REDUCTION MAMMAPLASTY Bilateral 1999    REPAIR LACERATION Left     left hand-2009    REPLACEMENT TOTAL KNEE Right     ROTATOR CUFF REPAIR Left     TONSILECTOMY AND ADNOIDECTOMY     [facility] PROCEDURE  2021    VEIN LIGATION Bilateral     WISDOM TOOTH EXTRACTION         FAMILY HISTORY:  Family History   Problem Relation Age of Onset    Kidney cancer Mother     Diabetes Mother     No Known Problems Father     No Known Problems Sister     No Known Problems Maternal Grandmother     No Known Problems Maternal Grandfather     No Known Problems Paternal Grandmother     No Known Problems Paternal Grandfather     Colon cancer Maternal Uncle     Colon cancer Maternal Uncle     Colon cancer Paternal Uncle     Colon cancer Family        SOCIAL HISTORY:  Social History     Tobacco Use    Smoking status: Former Smoker     Packs/day: 2 00     Years: 33 00     Pack years: 66 00     Types: Cigarettes     Start date: 5     Quit date: 2018     Years since quittin 4    Smokeless tobacco: Never Used    Tobacco comment: Started at age 14     Vaping Use    Vaping Use: Some days    Substances: THC   Substance Use Topics    Alcohol use: Not Currently     Comment: Recovering alcoholic    Drug use: Yes     Types: Marijuana, Methamphetamines     Comment: medical- vapes 3 times per wk at        MEDICATIONS:    Current Outpatient Medications:     amLODIPine (NORVASC) 5 mg tablet, TAKE 1 TABLET (5 MG TOTAL) BY MOUTH DAILY, Disp: 30 tablet, Rfl: 2    buPROPion (WELLBUTRIN XL) 150 mg 24 hr tablet, Take 150 mg by mouth daily, Disp: , Rfl:     buPROPion (WELLBUTRIN XL) 300 mg 24 hr tablet, Take 1 tablet (300 mg total) by mouth daily (Patient taking differently: Take 450 mg by mouth daily), Disp: 30 tablet, Rfl: 0    cetirizine (ZyrTEC) 10 mg tablet, , Disp: , Rfl:     Cholecalciferol (Vitamin D3) 125 MCG (5000 UT) CAPS, , Disp: , Rfl:     Cholecalciferol (Vitamin D3) 125 MCG (5000 UT) TABS, Take 5,000 Units by mouth daily, Disp: , Rfl:     colestipol (COLESTID) 1 g tablet, Take 1 tablet (1 g total) by mouth 2 (two) times a day, Disp: 60 tablet, Rfl: 0    desonide (DESOWEN) 0 05 % ointment, , Disp: , Rfl:     divalproex sodium (DEPAKOTE ER) 250 mg 24 hr tablet, Take 3 tablets (750 mg total) by mouth daily (Patient taking differently: Take 1,000 mg by mouth daily), Disp: 90 tablet, Rfl: 0    divalproex sodium (DEPAKOTE ER) 500 mg 24 hr tablet, Take 1,000 mg by mouth daily, Disp: , Rfl:     estradiol (Estrace) 0 5 MG tablet, Take 1 tablet (0 5 mg total) by mouth daily, Disp: 30 tablet, Rfl: 2    Fesoterodine Fumarate ER (Toviaz) 4 MG TB24, Take 1 tablet by mouth daily, Disp: , Rfl:     fluticasone (FLOVENT HFA) 110 MCG/ACT inhaler, Inhale 2 puffs 2 (two) times a day Rinse mouth after use , Disp: , Rfl:     furosemide (LASIX) 20 mg tablet, TAKE 1 TABLET BY MOUTH DAILY, Disp: 30 tablet, Rfl: 1    haloperidol decanoate (Haldol Decanoate) 50 mg/mL injection, Inject 50 mg into a muscle every 30 (thirty) days, Disp: , Rfl:     levothyroxine 100 mcg tablet, Take 1 tablet (100 mcg total) by mouth daily in the early morning, Disp: 90 tablet, Rfl: 1    losartan (COZAAR) 50 mg tablet, TAKE ONE TABLET BY MOUTH DAILY, Disp: 30 tablet, Rfl: 10    lurasidone 60 MG TABS, Take 1 tablet (60 mg total) by mouth daily with dinner, Disp: 30 tablet, Rfl: 0    metoprolol tartrate (LOPRESSOR) 25 mg tablet, Take 1 tablet (25 mg total) by mouth every 12 (twelve) hours, Disp: 60 tablet, Rfl: 5    olopatadine (PATANOL) 0 1 % ophthalmic solution, , Disp: , Rfl:     pilocarpine (SALAGEN) 5 mg tablet, Take 5 mg by mouth 3 (three) times a day, Disp: , Rfl:     RABEprazole (ACIPHEX) 20 MG tablet, Take 20 mg by mouth 2 (two) times a day, Disp: , Rfl:     rOPINIRole (REQUIP) 0 5 mg tablet, , Disp: , Rfl:     topiramate (TOPAMAX) 100 mg tablet, Take 1 tablet (100 mg total) by mouth 2 (two) times a day, Disp: 60 tablet, Rfl: 0    triamcinolone (KENALOG) 0 1 % cream, Apply topically 2 (two) times a day, Disp: 30 g, Rfl: 0    venlafaxine (EFFEXOR) 37 5 mg tablet, Take 37 5 mg by mouth in the morning, Disp: , Rfl:     venlafaxine (EFFEXOR-XR) 75 mg 24 hr capsule, Take 1 capsule by mouth in the morning (Patient not taking: Reported on 6/13/2022), Disp: , Rfl:     ALLERGIES:  Allergies   Allergen Reactions    Percocet [Oxycodone-Acetaminophen] Headache     Severe headaches   (denies issues with Tylenol)    Betadine [Povidone Iodine] Rash     Unsure if betadine or gauze post surgical  Got rash on leg   Chlorhexidine Rash       REVIEW OF SYSTEMS:  Review of Systems   Constitutional: Negative for chills, fatigue and fever  HENT: Negative for hearing loss, nosebleeds and sore throat  Eyes: Negative for redness and visual disturbance  Respiratory: Negative for cough, shortness of breath and wheezing  Cardiovascular: Negative for chest pain, palpitations and leg swelling  Gastrointestinal: Negative for abdominal pain, nausea and vomiting  Endocrine: Negative for polydipsia and polyuria  Genitourinary: Negative for difficulty urinating, dysuria and hematuria  Musculoskeletal: Positive for arthralgias  Negative for joint swelling and myalgias  Skin: Negative for rash and wound  Neurological: Negative for speech difficulty, weakness, numbness and headaches  Psychiatric/Behavioral: Negative for decreased concentration and suicidal ideas  The patient is not nervous/anxious          VITALS:  Vitals:    06/13/22 1208   BP: 121/78   Pulse: 69   Resp: 17       LABS:  HgA1c:   Lab Results   Component Value Date    HGBA1C 5 2 02/22/2022     BMP:   Lab Results   Component Value Date    CALCIUM 9 2 02/22/2022    K 4 1 02/22/2022    CO2 26 02/22/2022     (H) 02/22/2022    BUN 17 02/22/2022    CREATININE 0 64 02/22/2022       _____________________________________________________  PHYSICAL EXAMINATION:  General: well developed and well nourished, alert, oriented times 3 and appears comfortable  Psychiatric: Normal  HEENT: Normocephalic, Atraumatic Trachea Midline, No torticollis  Pulmonary: No audible wheezing or respiratory distress   Cardiovascular: No pitting edema, 2+ radial pulse   Skin: No masses, erythema, lacerations, fluctation, ulcerations  Neurovascular: Sensation Intact to the Median, Ulnar, Radial Nerve, Motor Intact to the Median, Ulnar, Radial Nerve and Pulses Intact  Musculoskeletal: Normal, except as noted in detailed exam and in HPI  MUSCULOSKELETAL EXAMINATION:  Left Elbow:    No swelling or deformity  Full range of motion with flexion, extension, supination and pronation  Positive tenderness to palpation over the Medial Epicondyle  No pain with passive flexion of the wrist with elbow fully extended  Pain with resisted wrist flexion with elbow fully extended  Pain with resisted forearm pronation with the elbow fully extended  Negative tinels over the ulnar nerve at the elbow      Healed surgical scar at the medial elbow    ___________________________________________________  STUDIES REVIEWED:  US guided common flexor injection sucessful      PROCEDURES PERFORMED:  Procedures  No Procedures performed today    _____________________________________________________    Eugena Living    I,:  Alisa Zhao PA-C am acting as a scribe while in the presence of the attending physician :       I,:  Brandy Lira MD personally performed the services described in this documentation    as scribed in my presence :

## 2022-06-13 NOTE — LETTER
June 13, 2022     Patient: Beny Christina  YOB: 1970  Date of Visit: 6/13/2022      To Whom it May Concern:    Hermelinda Landau is under my professional care  Melinda Bello was seen in my office on 6/13/2022  Melinda Bello may return to work tomorrow  If you have any questions or concerns, please don't hesitate to call  Sincerely,          Milagros Vasquez MD        CC: Akanksha Parra 26

## 2022-06-13 NOTE — PROGRESS NOTES
Sleep Study Documentation    Pre-Sleep Study       Sleep testing procedure explained to patient:YES    Patient napped prior to study:NO    Caffeine:Dayshift worker after 12PM   Caffeine use:NO    Alcohol:Dayshift workers after 5PM: Alcohol use:NO    Typical day for patient:YES       Study Documentation    Sleep Study Indications: retitration study due to using CPAP at home and continued daytime sleepiness    Sleep Study: Treatment   Optimal PAP pressure: 12cm  Leak:None  Snore:Eliminated  REM Obtained:yes  Supplemental O2: no    Minimum SaO2 93  Baseline SaO2 95  PAP mask choice medium ResMed AirFit F20  PAP mask type:full face  PAP pressure at which snoring was eliminated 12cm  Mode of Therapy:CPAP  ETCO2:No  CPAP changed to BiPAP:No      EKG abnormalities: no     EEG abnormalities: no    Sleep Study Recorded < 2 hours: N/A    Sleep Study Recorded > 2 hours but incomplete study: N/A    Sleep Study Recorded 6 hours but no sleep obtained: NO         Post-Sleep Study    Medication used at bedtime or during sleep study:YES other prescription medications    Patient reports time it took to fall asleep:less than 20 minutes    Patient reports waking up during study:3 or more times  Patient reports returning to sleep without difficulty  Patient reports sleeping 2 to 4 hours without dreaming  Patient reports sleep during study:better than usual    Patient rated sleepiness: Somewhat sleepy or tired    PAP treatment:yes: Post PAP treatment patient reports feeling better and  would wear PAP mask at home

## 2022-06-13 NOTE — PROGRESS NOTES
ASSESSMENT/PLAN:      50 yo female with medial epicondylitis   Reviewed diagnosis with the patient along with treatment options  At this point she has tried therapy and injections as well as bracing all of which have helped intermittently but not provided lasting relief  We did review her MRI from 2021 which was negative for any lateral or medial epicondylitis, however do with this is worth repeating prior to proceeding with surgery  We discussed a medial epicondylectomy along with risks benefits and alternatives  Patient would like to proceed and did sign informed consent today  Plan to review MRI prior to surgery  We discussed postop protocol including extended splinting and continued OT  Follow up after surgery    The patient verbalized understanding of exam findings and treatment plan  We engaged in the shared decision-making process and treatment options were discussed at length with the patient  Surgical and conservative management discussed today along with risks and benefits  Diagnoses and all orders for this visit:    Medial epicondylitis of elbow, left  -     MRI elbow left wo contrast; Future  -     Case request operating room: RELEASE EPICONDYLAR ELBOW MEDIAL; Standing  -     Case request operating room: RELEASE EPICONDYLAR ELBOW MEDIAL    Ganglion cyst of finger of left hand  -     Ambulatory Referral to Hand Surgery    Other orders  -     Nursing Communication Warmimg Interventions Implemented; Standing  -     Nursing Communication CHG bath, have staff wash entire body (neck down) per pre-op bathing protocol  Routine, evening prior to, and day of surgery ; Standing  -     Nursing Communication Swab both nares with Povidone-Iodine solution, EXCLUDE if patient has shellfish/Iodine allergy  Routine, day of surgery, on call to OR; Standing  -     chlorhexidine (PERIDEX) 0 12 % oral rinse 15 mL  -     Void on call to OR; Standing  -     Insert peripheral IV;  Standing  -     Diet NPO; Sips with meds; Standing  -     ceFAZolin (ANCEF) IVPB (premix in dextrose) 2,000 mg 50 mL  -     Apply Sequential Compression Device; Standing        Medial Epicondylitis Release: The anatomy and physiology of medial epicondylitis was discussed with the patient today  Typically, degenerative changes in the flexor pronator mass occur over time  These degenerative changes appear as tears of the tendon on MRI  This creates pain over the medial epicondyle  This pain typically is made worse with palm up lifting activities as well as anything that involves strength and stability of the wrist   The pain may radiate from the wrist up to the elbow  At times, the shoulder may be weak as well which can predispose or cause continuation of the problem  Conservative treatment usually cures a vast majority of patients; however, this may take up to 6-9 months  Conservative treatment options typically include activity modification, therapy for strengthening of the shoulder and elbow, tennis elbow strap, and possible corticosteroid injections  Corticosteroid injections are very effective at relieving pain but do not alter the natural history of this process  Rather, steroid injections decrease the pain temporarily to allow for therapy to take place without discomfort  It was discussed that therapy to prevent recurrence is a "life long" process and that if patient relies on the steroid injection alone without performing therapeutic exercises the risk of recurrence is likely  Typical home regimen includes heat,  stretching and resisted wrist flex ion and forearm rotation exercises discussed in the office  Surgery is required in fewer than 5% of patients  At times, the ulnar nerve may be aggravated with this condition  The patient has elected surgical release of the medical epicondylitis  The risks and benefits of the procedure were explained to the patient, which include, but are not limited to: Bleeding, infection, recurrence, pain, scar, damage to tendons, damage to nerves, and damage to blood vessels, failure to give desired results and complications related to anesthesia  These risks, along with alternative conservative treatment options, and postoperative protocols were voiced back and understood by the patient  All questions were answered to the patient's satisfaction  The patient agrees to comply with a standard postoperative protocol, and is willing to proceed  Education was provided via written and auditory forms  There were no barriers to learning  Written handouts regarding wound care, incision and scar care, and general preoperative information was provided to the patient  Prior to surgery, the patient may be requested to stop all anti-inflammatory medications  Prophylactic aspirin, Plavix, and Coumadin may be allowed to be continued  Medications including vitamin E , ginkgo, and fish oil are requested to be stopped approximately one week prior to surgery  Hypertensive medications and beta blockers, if taken, should be continued  Follow Up:  Return for After Surgery  To Do Next Visit:  Re-evaluation of current issue    ____________________________________________________________________________________________________________________________________________      CHIEF COMPLAINT:  Chief Complaint   Patient presents with    Right Elbow - Follow-up    Left Elbow - Pain, Follow-up       SUBJECTIVE:  Jessi Kat is a 49y o  year old RHD female who presents for follow up of chronic left elbow pian  She was seen by us in January for lateral epicondylitis and then again by Dr Jane Christina for medial epicondylitis  She recently under went an injection at the medial epicondyle which helped her pain for about a month  She continues to have medial elbow pain which inhibits her daily tasks  No paresthesias  She does have hx of cubital tunnel release without transposition          I have personally reviewed all the relevant PMH, PSH, SH, FH, Medications and allergies  PAST MEDICAL HISTORY:  Past Medical History:   Diagnosis Date    Anxiety     Arthritis     oa cassandra knees    Asthma     good control- no medications    Yan's esophagus     Bipolar affective disorder (HCC)     Chronic pain disorder     lumbar    CPAP (continuous positive airway pressure) dependence     Degenerative disc disease at L5-S1 level     Deliberate self-cutting     Depression 09/16/2008    Disease of thyroid gland     hypo    MARTINEZ (dyspnea on exertion)     Drug overdose 10/28/2008    suicide attempt    Dysphagia     Dyspnea     Edema     BLE    GERD (gastroesophageal reflux disease)     High blood pressure     History of COVID-19 12/30/2020    Symptoms started on 12/30/2020 and then got worse  Today she is feeling a little bit better  She is a candidate for monoclonal antibody infusion and set for 1/6/21 @ 1pm at [facility]  01/07/21 - telemedicine -     Hypertension     Knee pain, bilateral     Especially right    Migraines     Obese     Overactive bladder     Sjogren's disease ([facility] [address]     Sleep apnea     Stress incontinence     Suicidal ideations     Umbilical hernia     surgical repair today 4/24/2019    Use of cane as ambulatory aid     awaiting b/l knee replacement    Wears glasses        PAST SURGICAL HISTORY:  Past Surgical History:   Procedure Laterality Date    BREAST CYST EXCISION Right 1989    CARPAL TUNNEL RELEASE Left     CHOLECYSTECTOMY  05/2003    Laparoscopic    COLONOSCOPY      01/12/2009    DILATION AND CURETTAGE OF UTERUS      ELBOW SURGERY Left     x2   No hardware    ESOPHAGOGASTRODUODENOSCOPY      FOOT SURGERY Left     Plantar fasciotomy    HYSTERECTOMY      laporoscopic, partial    KNEE ARTHROSCOPY Bilateral     OOPHORECTOMY Left 10/2015    OR ANAL SPHINCTEROTOMY N/A 08/31/2018    Procedure: EUA, LEFT PARTIAL INTERNAL SPHINCTEROTOMY;  Surgeon: Miracle Erwin MD;  Location: [address] [place] [address];  Service: Colorectal  TX GASTROCNEMIUS RECESSION Left 2021    Procedure: RECESSION GASTROC OPEN;  Surgeon: Neo Callaway DPM;  Location: [address] [place] [address] MAIN OR;  Service: [facility] ORDG,5+I/J,SAEED N/A 2019    Procedure: REPAIR HERNIA UMBILICAL LAPAROSCOPIC W/ ROBOTICS;  Surgeon: Gianni Montalvo MD;  Location: [facility] Main OR;  Service: General    REDUCTION MAMMAPLASTY Bilateral     REPAIR LACERATION Left     left hand-2009    REPLACEMENT TOTAL KNEE Right     ROTATOR CUFF REPAIR Left     TONSILECTOMY AND ADNOIDECTOMY     [facility] PROCEDURE  2021    VEIN LIGATION Bilateral     WISDOM TOOTH EXTRACTION         FAMILY HISTORY:  Family History   Problem Relation Age of Onset    Kidney cancer Mother     Diabetes Mother     No Known Problems Father     No Known Problems Sister     No Known Problems Maternal Grandmother     No Known Problems Maternal Grandfather     No Known Problems Paternal Grandmother     No Known Problems Paternal Grandfather     Colon cancer Maternal Uncle     Colon cancer Maternal Uncle     Colon cancer Paternal Uncle     Colon cancer Family        SOCIAL HISTORY:  Social History     Tobacco Use    Smoking status: Former Smoker     Packs/day: 2 00     Years: 33 00     Pack years: 66 00     Types: Cigarettes     Start date: 5     Quit date: 2018     Years since quittin 4    Smokeless tobacco: Never Used    Tobacco comment: Started at age 14     Vaping Use    Vaping Use: Some days    Substances: THC   Substance Use Topics    Alcohol use: Not Currently     Comment: Recovering alcoholic    Drug use: Yes     Types: Marijuana, Methamphetamines     Comment: medical- vapes 3 times per wk at        MEDICATIONS:    Current Outpatient Medications:     amLODIPine (NORVASC) 5 mg tablet, TAKE 1 TABLET (5 MG TOTAL) BY MOUTH DAILY, Disp: 30 tablet, Rfl: 2    buPROPion (WELLBUTRIN XL) 150 mg 24 hr tablet, Take 150 mg by mouth daily, Disp: , Rfl:     buPROPion (WELLBUTRIN XL) 300 mg 24 hr tablet, Take 1 tablet (300 mg total) by mouth daily (Patient taking differently: Take 450 mg by mouth daily), Disp: 30 tablet, Rfl: 0    cetirizine (ZyrTEC) 10 mg tablet, , Disp: , Rfl:     Cholecalciferol (Vitamin D3) 125 MCG (5000 UT) CAPS, , Disp: , Rfl:     Cholecalciferol (Vitamin D3) 125 MCG (5000 UT) TABS, Take 5,000 Units by mouth daily, Disp: , Rfl:     colestipol (COLESTID) 1 g tablet, Take 1 tablet (1 g total) by mouth 2 (two) times a day, Disp: 60 tablet, Rfl: 0    desonide (DESOWEN) 0 05 % ointment, , Disp: , Rfl:     divalproex sodium (DEPAKOTE ER) 250 mg 24 hr tablet, Take 3 tablets (750 mg total) by mouth daily (Patient taking differently: Take 1,000 mg by mouth daily), Disp: 90 tablet, Rfl: 0    divalproex sodium (DEPAKOTE ER) 500 mg 24 hr tablet, Take 1,000 mg by mouth daily, Disp: , Rfl:     estradiol (Estrace) 0 5 MG tablet, Take 1 tablet (0 5 mg total) by mouth daily, Disp: 30 tablet, Rfl: 2    Fesoterodine Fumarate ER (Toviaz) 4 MG TB24, Take 1 tablet by mouth daily, Disp: , Rfl:     fluticasone (FLOVENT HFA) 110 MCG/ACT inhaler, Inhale 2 puffs 2 (two) times a day Rinse mouth after use , Disp: , Rfl:     furosemide (LASIX) 20 mg tablet, TAKE 1 TABLET BY MOUTH DAILY, Disp: 30 tablet, Rfl: 1    haloperidol decanoate (Haldol Decanoate) 50 mg/mL injection, Inject 50 mg into a muscle every 30 (thirty) days, Disp: , Rfl:     levothyroxine 100 mcg tablet, Take 1 tablet (100 mcg total) by mouth daily in the early morning, Disp: 90 tablet, Rfl: 1    losartan (COZAAR) 50 mg tablet, TAKE ONE TABLET BY MOUTH DAILY, Disp: 30 tablet, Rfl: 10    lurasidone 60 MG TABS, Take 1 tablet (60 mg total) by mouth daily with dinner, Disp: 30 tablet, Rfl: 0    metoprolol tartrate (LOPRESSOR) 25 mg tablet, Take 1 tablet (25 mg total) by mouth every 12 (twelve) hours, Disp: 60 tablet, Rfl: 5    olopatadine (PATANOL) 0 1 % ophthalmic solution, , Disp: , Rfl:     pilocarpine (SALAGEN) 5 mg tablet, Take 5 mg by mouth 3 (three) times a day, Disp: , Rfl:     RABEprazole (ACIPHEX) 20 MG tablet, Take 20 mg by mouth 2 (two) times a day, Disp: , Rfl:     rOPINIRole (REQUIP) 0 5 mg tablet, , Disp: , Rfl:     topiramate (TOPAMAX) 100 mg tablet, Take 1 tablet (100 mg total) by mouth 2 (two) times a day, Disp: 60 tablet, Rfl: 0    triamcinolone (KENALOG) 0 1 % cream, Apply topically 2 (two) times a day, Disp: 30 g, Rfl: 0    venlafaxine (EFFEXOR) 37 5 mg tablet, Take 37 5 mg by mouth in the morning, Disp: , Rfl:     venlafaxine (EFFEXOR-XR) 75 mg 24 hr capsule, Take 1 capsule by mouth in the morning (Patient not taking: Reported on 6/13/2022), Disp: , Rfl:     ALLERGIES:  Allergies   Allergen Reactions    Percocet [Oxycodone-Acetaminophen] Headache     Severe headaches   (denies issues with Tylenol)    Betadine [Povidone Iodine] Rash     Unsure if betadine or gauze post surgical  Got rash on leg   Chlorhexidine Rash       REVIEW OF SYSTEMS:  Review of Systems   Constitutional: Negative for chills, fatigue and fever  HENT: Negative for hearing loss, nosebleeds and sore throat  Eyes: Negative for redness and visual disturbance  Respiratory: Negative for cough, shortness of breath and wheezing  Cardiovascular: Negative for chest pain, palpitations and leg swelling  Gastrointestinal: Negative for abdominal pain, nausea and vomiting  Endocrine: Negative for polydipsia and polyuria  Genitourinary: Negative for difficulty urinating, dysuria and hematuria  Musculoskeletal: Positive for arthralgias  Negative for joint swelling and myalgias  Skin: Negative for rash and wound  Neurological: Negative for speech difficulty, weakness, numbness and headaches  Psychiatric/Behavioral: Negative for decreased concentration and suicidal ideas  The patient is not nervous/anxious          VITALS:  Vitals:    06/13/22 1208   BP: 121/78   Pulse: 69   Resp: 17       LABS:  HgA1c:   Lab Results   Component Value Date    HGBA1C 5 2 02/22/2022     BMP:   Lab Results   Component Value Date    CALCIUM 9 2 02/22/2022    K 4 1 02/22/2022    CO2 26 02/22/2022     (H) 02/22/2022    BUN 17 02/22/2022    CREATININE 0 64 02/22/2022       _____________________________________________________  PHYSICAL EXAMINATION:  General: well developed and well nourished, alert, oriented times 3 and appears comfortable  Psychiatric: Normal  HEENT: Normocephalic, Atraumatic Trachea Midline, No torticollis  Pulmonary: No audible wheezing or respiratory distress   Cardiovascular: No pitting edema, 2+ radial pulse   Skin: No masses, erythema, lacerations, fluctation, ulcerations  Neurovascular: Sensation Intact to the Median, Ulnar, Radial Nerve, Motor Intact to the Median, Ulnar, Radial Nerve and Pulses Intact  Musculoskeletal: Normal, except as noted in detailed exam and in HPI  MUSCULOSKELETAL EXAMINATION:  Left Elbow:    No swelling or deformity  Full range of motion with flexion, extension, supination and pronation  Positive tenderness to palpation over the Medial Epicondyle  No pain with passive flexion of the wrist with elbow fully extended  Pain with resisted wrist flexion with elbow fully extended  Pain with resisted forearm pronation with the elbow fully extended  Negative tinels over the ulnar nerve at the elbow      Healed surgical scar at the medial elbow    ___________________________________________________  STUDIES REVIEWED:  US guided common flexor injection sucessful      PROCEDURES PERFORMED:  Procedures  No Procedures performed today    _____________________________________________________    Mandy Cano    I,:  Zoey Johnson PA-C am acting as a scribe while in the presence of the attending physician :       I,:  Joce Pierre MD personally performed the services described in this documentation    as scribed in my presence :

## 2022-06-14 ENCOUNTER — TELEPHONE (OUTPATIENT)
Dept: SLEEP CENTER | Facility: CLINIC | Age: 52
End: 2022-06-14

## 2022-06-14 LAB
DME PARACHUTE DELIVERY DATE REQUESTED: NORMAL
DME PARACHUTE ITEM DESCRIPTION: NORMAL
DME PARACHUTE ORDER STATUS: NORMAL
DME PARACHUTE SUPPLIER NAME: NORMAL
DME PARACHUTE SUPPLIER PHONE: NORMAL

## 2022-06-20 DIAGNOSIS — J44.9 CHRONIC OBSTRUCTIVE PULMONARY DISEASE, UNSPECIFIED COPD TYPE (HCC): Primary | ICD-10-CM

## 2022-06-20 RX ORDER — TRAMADOL HYDROCHLORIDE 50 MG/1
TABLET ORAL
COMMUNITY
Start: 2022-06-17 | End: 2022-07-15

## 2022-06-20 RX ORDER — ALBUTEROL SULFATE 90 UG/1
2 AEROSOL, METERED RESPIRATORY (INHALATION) EVERY 6 HOURS PRN
Qty: 18 G | Refills: 5 | Status: SHIPPED | OUTPATIENT
Start: 2022-06-20 | End: 2023-09-15 | Stop reason: SDUPTHER

## 2022-06-23 ENCOUNTER — PROCEDURE VISIT (OUTPATIENT)
Dept: NEUROLOGY | Facility: CLINIC | Age: 52
End: 2022-06-23
Payer: MEDICARE

## 2022-06-23 VITALS — HEART RATE: 71 BPM | DIASTOLIC BLOOD PRESSURE: 98 MMHG | SYSTOLIC BLOOD PRESSURE: 170 MMHG | TEMPERATURE: 96.7 F

## 2022-06-23 DIAGNOSIS — G43.709 CHRONIC MIGRAINE WITHOUT AURA WITHOUT STATUS MIGRAINOSUS, NOT INTRACTABLE: Primary | ICD-10-CM

## 2022-06-23 PROCEDURE — 64615 CHEMODENERV MUSC MIGRAINE: CPT | Performed by: PHYSICIAN ASSISTANT

## 2022-06-23 NOTE — PROGRESS NOTES

## 2022-06-24 ENCOUNTER — TELEPHONE (OUTPATIENT)
Dept: SLEEP CENTER | Facility: CLINIC | Age: 52
End: 2022-06-24

## 2022-06-24 ENCOUNTER — HOSPITAL ENCOUNTER (OUTPATIENT)
Dept: RADIOLOGY | Age: 52
Discharge: HOME | End: 2022-06-24
Payer: MEDICARE

## 2022-06-24 DIAGNOSIS — M77.02 MEDIAL EPICONDYLITIS OF ELBOW, LEFT: ICD-10-CM

## 2022-06-24 PROCEDURE — 73221 MRI JOINT UPR EXTREM W/O DYE: CPT

## 2022-06-24 PROCEDURE — G1004 CDSM NDSC: HCPCS

## 2022-06-24 NOTE — TELEPHONE ENCOUNTER
Spoke with the patient advised that Dr Roslyn Tomas ordered pressure change after her CPAP study   Patient confirmed pressure was changed and she is doing well

## 2022-07-11 ENCOUNTER — OFFICE VISIT (OUTPATIENT)
Dept: SLEEP CENTER | Facility: CLINIC | Age: 52
End: 2022-07-11
Payer: MEDICARE

## 2022-07-11 VITALS
HEIGHT: 66 IN | HEART RATE: 79 BPM | SYSTOLIC BLOOD PRESSURE: 120 MMHG | OXYGEN SATURATION: 97 % | BODY MASS INDEX: 45.71 KG/M2 | TEMPERATURE: 98.8 F | WEIGHT: 284.4 LBS | DIASTOLIC BLOOD PRESSURE: 78 MMHG

## 2022-07-11 DIAGNOSIS — G47.33 OSA (OBSTRUCTIVE SLEEP APNEA): Chronic | ICD-10-CM

## 2022-07-11 DIAGNOSIS — Z87.891 HISTORY OF TOBACCO USE: ICD-10-CM

## 2022-07-11 DIAGNOSIS — G47.00 INSOMNIA, UNSPECIFIED TYPE: Primary | ICD-10-CM

## 2022-07-11 DIAGNOSIS — J44.9 CHRONIC OBSTRUCTIVE PULMONARY DISEASE, UNSPECIFIED COPD TYPE (HCC): Chronic | ICD-10-CM

## 2022-07-11 DIAGNOSIS — J98.4 RESTRICTIVE LUNG DISEASE: ICD-10-CM

## 2022-07-11 PROCEDURE — 99214 OFFICE O/P EST MOD 30 MIN: CPT | Performed by: INTERNAL MEDICINE

## 2022-07-11 RX ORDER — TOPIRAMATE 50 MG/1
TABLET, FILM COATED ORAL
COMMUNITY
Start: 2022-06-22 | End: 2022-07-15

## 2022-07-11 RX ORDER — RAMELTEON 8 MG/1
8 TABLET ORAL
Qty: 30 TABLET | Refills: 0 | Status: SHIPPED | OUTPATIENT
Start: 2022-07-11 | End: 2022-07-15

## 2022-07-11 RX ORDER — LURASIDONE HYDROCHLORIDE 20 MG/1
20 TABLET, FILM COATED ORAL
COMMUNITY
Start: 2022-06-22 | End: 2022-07-26

## 2022-07-11 NOTE — PROGRESS NOTES
Review of Systems      Genitourinary need to urinate more than twice a night   Cardiology none   Gastrointestinal none   Neurology forgetfulness and difficulty with memory   Constitutional fatigue   Integumentary itching   Psychiatry anxiety, depression and mood change   Musculoskeletal none   Pulmonary none   ENT none   Endocrine none   Hematological none

## 2022-07-12 NOTE — ASSESSMENT & PLAN NOTE
On review of most recent CPAP compliance report patient has not been using CPAP on most night for and adequate duration  She does, however, a low H eye, with suggest that when she does use it, she is deriving benefit  She states that she has difficulty with sleep cycle interruption  Recommend to continue CPAP, and will begin a trial of remelteon to assist with sleep cycle maintenance

## 2022-07-12 NOTE — ASSESSMENT & PLAN NOTE
Patient reports dyspnea on exertion and has faint bibasilar rales on physical examination  There are no PFTs on record  Will obtain

## 2022-07-12 NOTE — PROGRESS NOTES
Assessment/Plan:    MAG (obstructive sleep apnea)  On review of most recent CPAP compliance report patient has not been using CPAP on most night for and adequate duration  She does, however, a low H eye, with suggest that when she does use it, she is deriving benefit  She states that she has difficulty with sleep cycle interruption  Recommend to continue CPAP, and will begin a trial of remelteon to assist with sleep cycle maintenance  COPD (chronic obstructive pulmonary disease) ([facility] [address] )  Patient reports dyspnea on exertion and has faint bibasilar rales on physical examination  There are no PFTs on record  Will obtain  Diagnoses and all orders for this visit:    Insomnia, unspecified type  -     ramelteon (ROZEREM) 8 mg tablet; Take 1 tablet (8 mg total) by mouth daily at bedtime    History of tobacco use  -     Complete PFT with post bronchodilator; Future    Other orders  -     topiramate (TOPAMAX) 50 MG tablet  -     Latuda 20 MG tablet          Subjective:      Patient ID: Vladislav Murcia is a 49 y o  female  Ms Bayron Pichardo presents today for a follow-up visit for the management of obstructive sleep apnea and restless leg syndrome  She states that she uses her CPAP most nights, however, she states that she experiences sleep cycle disruptions every night, at which time she leaves her bed and tries to sleep in her recliner in the living room; she states that she does not take her CPAP with her while she is in the recliner, as it is difficult to set it up in the living room  She states that she still experiences daytime fatigue and takes multiple naps during the day   She believes that, since she has not been working routinely, this may also be contributing toward her sleep cycle disruption  She denies any ongoing symptoms of restless leg syndrome  In addition,Ms Bayron Pichardo, who has a history of COPD, reports that she is experiencing dyspnea on exertion, and believes that she will become short of breath ambulating a city block, as well as ascending a flight of stairs  She uses an albuterol inhaler intermittently  She does not have any pulmonary function test record  The following portions of the patient's history were reviewed and updated as appropriate: allergies, current medications, past family history, past medical history, past social history, past surgical history and problem list     Review of Systems   Constitutional: Negative for chills and fever  HENT: Negative for ear pain and sore throat  Eyes: Negative for pain and visual disturbance  Respiratory: Positive for shortness of breath  Negative for cough  Cardiovascular: Negative for chest pain and palpitations  Gastrointestinal: Negative for abdominal pain and vomiting  Genitourinary: Negative for dysuria and hematuria  Musculoskeletal: Negative for arthralgias and back pain  Skin: Negative for color change and rash  Neurological: Negative for seizures, syncope and headaches  Psychiatric/Behavioral: Positive for sleep disturbance  Negative for agitation and behavioral problems  All other systems reviewed and are negative  Objective:      /78 (BP Location: Right arm, Patient Position: Sitting, Cuff Size: Large)   Pulse 79   Temp 98 8 °F (37 1 °C)   Ht 5' 6" (1 676 m)   Wt 129 kg (284 lb 6 4 oz)   SpO2 97% Comment: Room Air  BMI 45 90 kg/m²          Physical Exam  Vitals reviewed  Constitutional:       Appearance: Normal appearance  She is obese  HENT:      Head: Normocephalic and atraumatic  Mouth/Throat:      Mouth: Mucous membranes are moist    Eyes:      General:         Right eye: No discharge  Left eye: No discharge  Conjunctiva/sclera: Conjunctivae normal    Cardiovascular:      Rate and Rhythm: Normal rate and regular rhythm  Heart sounds: S1 normal and S2 normal    Pulmonary:      Effort: Pulmonary effort is normal       Breath sounds: Rales present        Comments: Faint bibasilar rales at left greater than right lung zones  Abdominal:      Palpations: Abdomen is soft  Musculoskeletal:         General: No swelling or tenderness  Cervical back: Neck supple  Skin:     General: Skin is warm and dry  Neurological:      General: No focal deficit present  Mental Status: She is alert     Psychiatric:         Mood and Affect: Mood normal          Behavior: Behavior normal

## 2022-07-13 ENCOUNTER — HOSPITAL ENCOUNTER (INPATIENT)
Facility: HOSPITAL | Age: 52
LOS: 1 days | DRG: 918 | End: 2022-07-15
Attending: EMERGENCY MEDICINE | Admitting: INTERNAL MEDICINE
Payer: MEDICARE

## 2022-07-13 ENCOUNTER — TELEPHONE (OUTPATIENT)
Dept: SLEEP CENTER | Facility: CLINIC | Age: 52
End: 2022-07-13

## 2022-07-13 DIAGNOSIS — F39 MOOD DISORDER (HCC): ICD-10-CM

## 2022-07-13 DIAGNOSIS — Z00.8 MEDICAL CLEARANCE FOR PSYCHIATRIC ADMISSION: ICD-10-CM

## 2022-07-13 DIAGNOSIS — T50.902A INTENTIONAL DRUG OVERDOSE, INITIAL ENCOUNTER (HCC): Primary | ICD-10-CM

## 2022-07-13 DIAGNOSIS — E78.2 MIXED HYPERLIPIDEMIA: ICD-10-CM

## 2022-07-13 DIAGNOSIS — T14.91XA ATTEMPTED SUICIDE (HCC): ICD-10-CM

## 2022-07-13 DIAGNOSIS — T65.92XA SUICIDAL DELIBERATE POISONING, INITIAL ENCOUNTER (HCC): ICD-10-CM

## 2022-07-13 DIAGNOSIS — N95.1 VASOMOTOR SYMPTOMS DUE TO MENOPAUSE: ICD-10-CM

## 2022-07-13 LAB
ALBUMIN SERPL BCP-MCNC: 3.9 G/DL (ref 3.5–5)
ALBUMIN SERPL BCP-MCNC: 3.9 G/DL (ref 3.5–5)
ALP SERPL-CCNC: 61 U/L (ref 34–104)
ALP SERPL-CCNC: 65 U/L (ref 34–104)
ALT SERPL W P-5'-P-CCNC: 19 U/L (ref 7–52)
ALT SERPL W P-5'-P-CCNC: 20 U/L (ref 7–52)
AMMONIA PLAS-SCNC: 43 UMOL/L (ref 18–72)
AMMONIA PLAS-SCNC: 61 UMOL/L (ref 18–72)
AMPHETAMINES SERPL QL SCN: POSITIVE
ANION GAP SERPL CALCULATED.3IONS-SCNC: 10 MMOL/L (ref 4–13)
ANION GAP SERPL CALCULATED.3IONS-SCNC: 10 MMOL/L (ref 4–13)
APAP SERPL-MCNC: <10 UG/ML (ref 10–20)
AST SERPL W P-5'-P-CCNC: 13 U/L (ref 13–39)
AST SERPL W P-5'-P-CCNC: 14 U/L (ref 13–39)
ATRIAL RATE: 72 BPM
BARBITURATES UR QL: NEGATIVE
BASOPHILS # BLD AUTO: 0.05 THOUSANDS/ΜL (ref 0–0.1)
BASOPHILS NFR BLD AUTO: 0 % (ref 0–1)
BENZODIAZ UR QL: NEGATIVE
BILIRUB SERPL-MCNC: 0.28 MG/DL (ref 0.2–1)
BILIRUB SERPL-MCNC: 0.28 MG/DL (ref 0.2–1)
BUN SERPL-MCNC: 12 MG/DL (ref 5–25)
BUN SERPL-MCNC: 9 MG/DL (ref 5–25)
CALCIUM SERPL-MCNC: 9 MG/DL (ref 8.4–10.2)
CALCIUM SERPL-MCNC: 9 MG/DL (ref 8.4–10.2)
CHLORIDE SERPL-SCNC: 105 MMOL/L (ref 96–108)
CHLORIDE SERPL-SCNC: 107 MMOL/L (ref 96–108)
CO2 SERPL-SCNC: 23 MMOL/L (ref 21–32)
CO2 SERPL-SCNC: 24 MMOL/L (ref 21–32)
COCAINE UR QL: NEGATIVE
CREAT SERPL-MCNC: 0.63 MG/DL (ref 0.6–1.3)
CREAT SERPL-MCNC: 0.64 MG/DL (ref 0.6–1.3)
EOSINOPHIL # BLD AUTO: 0.17 THOUSAND/ΜL (ref 0–0.61)
EOSINOPHIL NFR BLD AUTO: 2 % (ref 0–6)
ERYTHROCYTE [DISTWIDTH] IN BLOOD BY AUTOMATED COUNT: 13 % (ref 11.6–15.1)
ETHANOL EXG-MCNC: 0 MG/DL
ETHANOL SERPL-MCNC: <10 MG/DL
EXT PREG TEST URINE: NEGATIVE
EXT. CONTROL ED NAV: NORMAL
FLUAV RNA RESP QL NAA+PROBE: NEGATIVE
FLUBV RNA RESP QL NAA+PROBE: NEGATIVE
GFR SERPL CREATININE-BSD FRML MDRD: 104 ML/MIN/1.73SQ M
GFR SERPL CREATININE-BSD FRML MDRD: 104 ML/MIN/1.73SQ M
GLUCOSE P FAST SERPL-MCNC: 97 MG/DL (ref 65–99)
GLUCOSE SERPL-MCNC: 90 MG/DL (ref 65–140)
GLUCOSE SERPL-MCNC: 97 MG/DL (ref 65–140)
HCT VFR BLD AUTO: 45.5 % (ref 34.8–46.1)
HGB BLD-MCNC: 14.6 G/DL (ref 11.5–15.4)
IMM GRANULOCYTES # BLD AUTO: 0.12 THOUSAND/UL (ref 0–0.2)
IMM GRANULOCYTES NFR BLD AUTO: 1 % (ref 0–2)
LYMPHOCYTES # BLD AUTO: 3.22 THOUSANDS/ΜL (ref 0.6–4.47)
LYMPHOCYTES NFR BLD AUTO: 28 % (ref 14–44)
MCH RBC QN AUTO: 30.3 PG (ref 26.8–34.3)
MCHC RBC AUTO-ENTMCNC: 32.1 G/DL (ref 31.4–37.4)
MCV RBC AUTO: 94 FL (ref 82–98)
METHADONE UR QL: NEGATIVE
MONOCYTES # BLD AUTO: 1.26 THOUSAND/ΜL (ref 0.17–1.22)
MONOCYTES NFR BLD AUTO: 11 % (ref 4–12)
NEUTROPHILS # BLD AUTO: 6.74 THOUSANDS/ΜL (ref 1.85–7.62)
NEUTS SEG NFR BLD AUTO: 58 % (ref 43–75)
NRBC BLD AUTO-RTO: 0 /100 WBCS
OPIATES UR QL SCN: NEGATIVE
OXYCODONE+OXYMORPHONE UR QL SCN: NEGATIVE
P AXIS: 57 DEGREES
PCP UR QL: NEGATIVE
PLATELET # BLD AUTO: 177 THOUSANDS/UL (ref 149–390)
PMV BLD AUTO: 11.2 FL (ref 8.9–12.7)
POTASSIUM SERPL-SCNC: 3.7 MMOL/L (ref 3.5–5.3)
POTASSIUM SERPL-SCNC: 4.2 MMOL/L (ref 3.5–5.3)
PR INTERVAL: 198 MS
PROT SERPL-MCNC: 6.7 G/DL (ref 6.4–8.4)
PROT SERPL-MCNC: 6.8 G/DL (ref 6.4–8.4)
QRS AXIS: -9 DEGREES
QRSD INTERVAL: 86 MS
QT INTERVAL: 364 MS
QTC INTERVAL: 398 MS
RBC # BLD AUTO: 4.82 MILLION/UL (ref 3.81–5.12)
RSV RNA RESP QL NAA+PROBE: NEGATIVE
SALICYLATES SERPL-MCNC: <5 MG/DL (ref 3–20)
SARS-COV-2 RNA RESP QL NAA+PROBE: NEGATIVE
SODIUM SERPL-SCNC: 139 MMOL/L (ref 135–147)
SODIUM SERPL-SCNC: 140 MMOL/L (ref 135–147)
T WAVE AXIS: 65 DEGREES
THC UR QL: POSITIVE
VALPROATE SERPL-MCNC: 130 UG/ML (ref 50–100)
VALPROATE SERPL-MCNC: 134 UG/ML (ref 50–100)
VALPROATE SERPL-MCNC: 160 UG/ML (ref 50–100)
VALPROATE SERPL-MCNC: 64 UG/ML (ref 50–100)
VENTRICULAR RATE: 72 BPM
WBC # BLD AUTO: 11.56 THOUSAND/UL (ref 4.31–10.16)

## 2022-07-13 PROCEDURE — 93005 ELECTROCARDIOGRAM TRACING: CPT

## 2022-07-13 PROCEDURE — 81025 URINE PREGNANCY TEST: CPT | Performed by: EMERGENCY MEDICINE

## 2022-07-13 PROCEDURE — 82075 ASSAY OF BREATH ETHANOL: CPT | Performed by: EMERGENCY MEDICINE

## 2022-07-13 PROCEDURE — 99220 PR INITIAL OBSERVATION CARE/DAY 70 MINUTES: CPT | Performed by: HOSPITALIST

## 2022-07-13 PROCEDURE — 80164 ASSAY DIPROPYLACETIC ACD TOT: CPT

## 2022-07-13 PROCEDURE — 82140 ASSAY OF AMMONIA: CPT

## 2022-07-13 PROCEDURE — 80053 COMPREHEN METABOLIC PANEL: CPT

## 2022-07-13 PROCEDURE — 80143 DRUG ASSAY ACETAMINOPHEN: CPT | Performed by: EMERGENCY MEDICINE

## 2022-07-13 PROCEDURE — 85025 COMPLETE CBC W/AUTO DIFF WBC: CPT | Performed by: EMERGENCY MEDICINE

## 2022-07-13 PROCEDURE — 80179 DRUG ASSAY SALICYLATE: CPT | Performed by: EMERGENCY MEDICINE

## 2022-07-13 PROCEDURE — 99449 NTRPROF PH1/NTRNET/EHR 31/>: CPT | Performed by: EMERGENCY MEDICINE

## 2022-07-13 PROCEDURE — 82077 ASSAY SPEC XCP UR&BREATH IA: CPT | Performed by: EMERGENCY MEDICINE

## 2022-07-13 PROCEDURE — 99285 EMERGENCY DEPT VISIT HI MDM: CPT

## 2022-07-13 PROCEDURE — 80307 DRUG TEST PRSMV CHEM ANLYZR: CPT | Performed by: EMERGENCY MEDICINE

## 2022-07-13 PROCEDURE — 36415 COLL VENOUS BLD VENIPUNCTURE: CPT | Performed by: EMERGENCY MEDICINE

## 2022-07-13 PROCEDURE — 93010 ELECTROCARDIOGRAM REPORT: CPT | Performed by: INTERNAL MEDICINE

## 2022-07-13 PROCEDURE — 99284 EMERGENCY DEPT VISIT MOD MDM: CPT | Performed by: EMERGENCY MEDICINE

## 2022-07-13 PROCEDURE — 82140 ASSAY OF AMMONIA: CPT | Performed by: EMERGENCY MEDICINE

## 2022-07-13 PROCEDURE — 80164 ASSAY DIPROPYLACETIC ACD TOT: CPT | Performed by: EMERGENCY MEDICINE

## 2022-07-13 PROCEDURE — 0241U HB NFCT DS VIR RESP RNA 4 TRGT: CPT | Performed by: EMERGENCY MEDICINE

## 2022-07-13 PROCEDURE — 80053 COMPREHEN METABOLIC PANEL: CPT | Performed by: EMERGENCY MEDICINE

## 2022-07-13 RX ORDER — ONDANSETRON 2 MG/ML
4 INJECTION INTRAMUSCULAR; INTRAVENOUS EVERY 6 HOURS PRN
Status: DISCONTINUED | OUTPATIENT
Start: 2022-07-13 | End: 2022-07-15 | Stop reason: HOSPADM

## 2022-07-13 RX ORDER — FLUTICASONE PROPIONATE 110 UG/1
2 AEROSOL, METERED RESPIRATORY (INHALATION) 2 TIMES DAILY
Status: DISCONTINUED | OUTPATIENT
Start: 2022-07-13 | End: 2022-07-15 | Stop reason: HOSPADM

## 2022-07-13 RX ORDER — SODIUM CHLORIDE 9 MG/ML
75 INJECTION, SOLUTION INTRAVENOUS CONTINUOUS
Status: DISCONTINUED | OUTPATIENT
Start: 2022-07-13 | End: 2022-07-14

## 2022-07-13 RX ORDER — KETOTIFEN FUMARATE 0.35 MG/ML
1 SOLUTION/ DROPS OPHTHALMIC 2 TIMES DAILY
Status: DISCONTINUED | OUTPATIENT
Start: 2022-07-13 | End: 2022-07-15 | Stop reason: HOSPADM

## 2022-07-13 RX ORDER — PANTOPRAZOLE SODIUM 40 MG/1
40 TABLET, DELAYED RELEASE ORAL
Status: DISCONTINUED | OUTPATIENT
Start: 2022-07-14 | End: 2022-07-13

## 2022-07-13 RX ORDER — ALBUTEROL SULFATE 90 UG/1
2 AEROSOL, METERED RESPIRATORY (INHALATION) EVERY 6 HOURS PRN
Status: DISCONTINUED | OUTPATIENT
Start: 2022-07-13 | End: 2022-07-15 | Stop reason: HOSPADM

## 2022-07-13 RX ORDER — TOPIRAMATE 100 MG/1
100 TABLET, FILM COATED ORAL 2 TIMES DAILY
Status: DISCONTINUED | OUTPATIENT
Start: 2022-07-13 | End: 2022-07-15 | Stop reason: HOSPADM

## 2022-07-13 RX ORDER — OXYBUTYNIN CHLORIDE 5 MG/1
5 TABLET, EXTENDED RELEASE ORAL DAILY
Status: DISCONTINUED | OUTPATIENT
Start: 2022-07-14 | End: 2022-07-15 | Stop reason: HOSPADM

## 2022-07-13 RX ORDER — ACETAMINOPHEN 325 MG/1
650 TABLET ORAL EVERY 6 HOURS PRN
Status: DISCONTINUED | OUTPATIENT
Start: 2022-07-13 | End: 2022-07-15 | Stop reason: HOSPADM

## 2022-07-13 RX ORDER — PANTOPRAZOLE SODIUM 40 MG/1
40 TABLET, DELAYED RELEASE ORAL
Status: DISCONTINUED | OUTPATIENT
Start: 2022-07-14 | End: 2022-07-15 | Stop reason: HOSPADM

## 2022-07-13 RX ORDER — LEVOTHYROXINE SODIUM 0.1 MG/1
100 TABLET ORAL
Status: DISCONTINUED | OUTPATIENT
Start: 2022-07-14 | End: 2022-07-15 | Stop reason: HOSPADM

## 2022-07-13 RX ORDER — ESTRADIOL 0.5 MG/1
0.5 TABLET ORAL DAILY
Qty: 30 TABLET | Refills: 1 | Status: SHIPPED | OUTPATIENT
Start: 2022-07-13 | End: 2022-09-08 | Stop reason: SDUPTHER

## 2022-07-13 RX ORDER — DIVALPROEX SODIUM 500 MG/1
1000 TABLET, EXTENDED RELEASE ORAL DAILY
Status: DISCONTINUED | OUTPATIENT
Start: 2022-07-14 | End: 2022-07-14

## 2022-07-13 RX ORDER — VENLAFAXINE 37.5 MG/1
37.5 TABLET ORAL DAILY
Status: DISCONTINUED | OUTPATIENT
Start: 2022-07-14 | End: 2022-07-14

## 2022-07-13 RX ORDER — MELATONIN
5000 DAILY
Status: DISCONTINUED | OUTPATIENT
Start: 2022-07-14 | End: 2022-07-15 | Stop reason: HOSPADM

## 2022-07-13 RX ORDER — BUPROPION HYDROCHLORIDE 150 MG/1
150 TABLET ORAL DAILY
Status: DISCONTINUED | OUTPATIENT
Start: 2022-07-14 | End: 2022-07-14

## 2022-07-13 RX ORDER — LANOLIN ALCOHOL/MO/W.PET/CERES
3 CREAM (GRAM) TOPICAL
Status: DISCONTINUED | OUTPATIENT
Start: 2022-07-13 | End: 2022-07-15 | Stop reason: HOSPADM

## 2022-07-13 RX ORDER — ENOXAPARIN SODIUM 100 MG/ML
40 INJECTION SUBCUTANEOUS DAILY
Status: DISCONTINUED | OUTPATIENT
Start: 2022-07-14 | End: 2022-07-15 | Stop reason: HOSPADM

## 2022-07-13 RX ORDER — FUROSEMIDE 20 MG/1
20 TABLET ORAL DAILY
Status: DISCONTINUED | OUTPATIENT
Start: 2022-07-14 | End: 2022-07-15 | Stop reason: HOSPADM

## 2022-07-13 RX ORDER — AMLODIPINE BESYLATE 5 MG/1
5 TABLET ORAL DAILY
Status: DISCONTINUED | OUTPATIENT
Start: 2022-07-14 | End: 2022-07-15 | Stop reason: HOSPADM

## 2022-07-13 RX ORDER — PILOCARPINE HYDROCHLORIDE 5 MG/1
5 TABLET, FILM COATED ORAL 3 TIMES DAILY
Status: DISCONTINUED | OUTPATIENT
Start: 2022-07-13 | End: 2022-07-15 | Stop reason: HOSPADM

## 2022-07-13 RX ORDER — ESTRADIOL 1 MG/1
0.5 TABLET ORAL DAILY
Status: DISCONTINUED | OUTPATIENT
Start: 2022-07-14 | End: 2022-07-15 | Stop reason: HOSPADM

## 2022-07-13 RX ORDER — ROPINIROLE 0.25 MG/1
0.5 TABLET, FILM COATED ORAL
Status: DISCONTINUED | OUTPATIENT
Start: 2022-07-13 | End: 2022-07-15 | Stop reason: HOSPADM

## 2022-07-13 RX ORDER — LORATADINE 10 MG/1
10 TABLET ORAL DAILY
Status: DISCONTINUED | OUTPATIENT
Start: 2022-07-14 | End: 2022-07-15 | Stop reason: HOSPADM

## 2022-07-13 RX ORDER — LOSARTAN POTASSIUM 50 MG/1
50 TABLET ORAL DAILY
Status: DISCONTINUED | OUTPATIENT
Start: 2022-07-14 | End: 2022-07-15 | Stop reason: HOSPADM

## 2022-07-13 RX ORDER — CHOLESTYRAMINE LIGHT 4 G/5.7G
4 POWDER, FOR SUSPENSION ORAL 2 TIMES DAILY
Status: DISCONTINUED | OUTPATIENT
Start: 2022-07-14 | End: 2022-07-15

## 2022-07-13 RX ORDER — RAMELTEON 8 MG/1
8 TABLET ORAL
Status: DISCONTINUED | OUTPATIENT
Start: 2022-07-13 | End: 2022-07-13

## 2022-07-13 RX ORDER — MONTELUKAST SODIUM 4 MG/1
1 TABLET, CHEWABLE ORAL 2 TIMES DAILY
Status: DISCONTINUED | OUTPATIENT
Start: 2022-07-13 | End: 2022-07-13

## 2022-07-13 RX ADMIN — TOPIRAMATE 100 MG: 100 TABLET, FILM COATED ORAL at 23:15

## 2022-07-13 RX ADMIN — SODIUM CHLORIDE 75 ML/HR: 0.9 INJECTION, SOLUTION INTRAVENOUS at 22:29

## 2022-07-13 RX ADMIN — ROPINIROLE 0.5 MG: 0.25 TABLET, FILM COATED ORAL at 23:15

## 2022-07-13 RX ADMIN — METOPROLOL TARTRATE 25 MG: 25 TABLET, FILM COATED ORAL at 23:14

## 2022-07-13 NOTE — ASSESSMENT & PLAN NOTE
Present on admission   PLAN  Psych Consult would be appreciated  Formal testing should be considered

## 2022-07-13 NOTE — ASSESSMENT & PLAN NOTE
Present on admission  Risk factor for SI, self harm, and attention seeking  PLAN  Psych Consult would be appreciated  Please reconcile home medication as list is extensive and patient admits doubts as the medications are prepared for them

## 2022-07-13 NOTE — ED NOTES
RN spoke with Johnson Naylor from the [facility]  They are very familiar with this patient and said that she frequently engages in negative attention seeking behaviors  She has gone through several ACT Team(s) and she "fires them" because "they aren't doing anything for her"  She mentioned that the patient currently lives in an apartment building and she is getting into frequent arguments with her neighbors  Her mother did pass away last year and this has been affecting her  She also frequently does not want to go to work and gets into arguments with her coworkers  Her father is living and she frequently gets into conflicts with him as well  They mentioned how The Act Team would like to be updated once there is a plan for the patient  They are aware that the patient needs to be medically cleared as well as see crisis work and case management     [facility] Act Team 24 hotline # is [email or web address] [phone], RN  07/13/22 2053

## 2022-07-13 NOTE — ED NOTES
Pt asking about phone call to father, told pt that primary RN would contact father, pt began screaming "that was important" pt began to kick at bed continuously while crying and screaming  Pt now leaning on wall refusing to get back into bed   Pt also ripped off cardiac monitoring and refusing vital signs at this time     Monique Florez  07/13/22 5042

## 2022-07-13 NOTE — ED NOTES
Pt given lunch box with josr per request, pt ate food and now laying in bed with no complaints   VSS     Danie Ruiz  07/13/22 [id number]

## 2022-07-13 NOTE — TELEPHONE ENCOUNTER
Patient called stating when she goes into the hospital they do not allow her to plug in her cpap machine  She would like something stating she must use her cpap       She also wanted me to give Dr Rafia Pierce her psychiatrist information: Dr Sonia Romero of [id number], #[phone]

## 2022-07-13 NOTE — ED NOTES
RN spoke with patient's father again  The patient is upset because she is worried about her cat, she is very tearful  I spoke with the father and he does not need the keys to get into the patient's apartment  He said that he already took care of her cat  The patient stated she was worried because she has her apartment keys here  The patient's father said to tell the patient that he does not need her keys  Patient is aware but still remains very tearful        Selena Coates RN  07/13/22 1923

## 2022-07-13 NOTE — ASSESSMENT & PLAN NOTE
Present on admission   PLAN  Psych Consult would be appreciated  Please reconcile home medication as list is extensive and patient admits doubts as the medications are prepared for them

## 2022-07-13 NOTE — ED NOTES
Pt monitoring taken over from A  Amanday Crate at this time  Pt awaiting inpatient bed   Vitals stable at this time and no current complaints, will continue to monitor      Hawa 6  07/13/22 1919

## 2022-07-13 NOTE — CONSULTS
INTERPROFESSIONAL (PHONE) [facility]  Eliecer Jero 49 y o  female MRN: [id number]  Unit/Bed#: ED 15 Encounter: [id number]       Reason for Consult / Principal Problem: intentional overdose  Inpatient consult to Toxicology  Consult performed by: Hung Lopez MD  Consult ordered by: Genoveva Ballard MD        07/13/22      ASSESSMENT:  49year old female presenting to the emergency department with intentional valproic acid overdose, known to have prescription for bupropion  RECOMMENDATIONS:    This patient has an increasing valproic acid level  We recommend Q 2 valproic acid, CMP trending until valproic acid has peaked and is clearly downtrending x2  Valproic acid is no to cause encephalopathy and hepatotoxicity  If patient develops encephalopathy, please repeat the ammonia level  If the patient develops a pattern toxicity, the patient will need to be treated with L-carnitine  L-Carnitine -  mg/kg followed by 50 mg/kg Q6 hours  If there are any questions overnight about valproic acid toxicity, please contact the [facility] 77 [phone]  We recommend placing patient on seizure precautions due to her prescription of bupropion, which can lower seizure threshold in therapeutic dosing, and can lead to delayed seizures up to 24 hours in the setting of an overdose  Should the patient become tachycardic, please obtain serial EKGs, as bupropion can lead to QRS widening, leading to cardiac arrhythmias  Acute toxicity may result in tachycardia, hypertension, GI symptoms, agitation, seizures, and QRS widening  These symptoms can last up to 48 hours after an acute ingestion  There are case reports of delayed cardiotoxicity, manifest as sodium channel blockade and QRS widening, in ingestions involving sustained release formulations  These cases  showed EKG abnormalities within 8-12 hours of the time of ingestion       Summary of recommendation    1   q2 VPA + CMP until clear peak and downtrend x2 of VPA level  - if the patient develops encephalopathy, please obtain ammonia level  - if patient develops hepatotoxicity, please administer l-carnitine  mg/kg followed by 50 mg/kg Q6 hours  2   Patient will be needed to be admitted for 24 hours due to delayed seizures  Place on seizure precautions     3  Benzodiazepines for seizures and agitation  4   Avoid neuroleptics, anticholinergic, and serotonergic agents at this time  5   Q1 EKGs while patient is tachycardic  - if QRS > 120, give 1-2 mEq per kg sodium bicarbonate  - goal: QRS < 120, titrate to pH 7 55, K > 4 0  - if QRS widening is refractory to sodium bicarbonate, please administer IV lidocaine or hypertonic saline     6  As with all overdoses, recommend social work/psychiatry when appropriate  7   Given that patient has already exhibited self-injurous behavior, consider switching patient to an antidepressant that is not associated with high risk and severe toxicity in the setting of an overdose  - The following antidepressant have high risk of toxicity in the setting of an overdose:              A) Wellbutrin (Buproprion)                  - wide QRS, cardiac arrhythmias, delayed seizures up to 24 hours              B) Celexa (Citalopram)                  - long QTc, delayed cardiac arrhythmias up to 24 hrs, seizures              C) Lexapra (Escitalopram)                  - long QTc, delayed cardiac arrhythmias up to 24 hrs, seizures              D) Effexor (Venlafaxine)                  - wide QRS, long QTc, cardiac arrhythmias, seizures,                                    hypoglycemia, serotonin syndrome      For further questions, please contact the medical  on call via [place] [employer] or throughl the [employer]  Service or [employer]       Please see additional teaching note below:    Consult Discussion: Valproic Acid  Medical Toxicology   8 [facility]  Last updated 3/17/19    Introduction  1  Valproate (Depakene, Depakote etc ) short-branched chained fatty acid anticonvulsant  2  Also used in treatment of bipolar disorder, chronic pain and as prophylaxis for migraine headaches    Pharmacology/Pathophysiology  1  100% absorbed  Absorbed rapidly except in delayed-release formulation  Peak serum level 6hrs  90% protein bound (this decreases in overdose)  Vd 0 5L/kg  2  95% metabolized via liver  T1/2 elimination 10hrs, prolonged to 30hrs in overdose  Metabolites are active and can contribute to toxicity in overdose  3  Acts as anticonvulsant via multiple mechanisms   a  Membrane stabilizing effect by inhibition of voltage-gated Na channel  b  Inhibition of T-type calcium channel (excessive conductance will cause seizure)  c  Competitive antagonism of NMDA (excitatory) neuroreceptor  d  Inhibition of SHITAL transaminase hence increasing SHITAL (inhibitory neurotransmitter) in the brain  4  Causes depletion of carnitine stores in liver  a  Forms valproylcarnitine which is excreted  b  Inhibits carnitine transporter  5  Fatty acid cannot be metabolized due to lack of carnitine resulting in chronic fatty liver  6  Depletion of carnitine also disrupts incorporation of ammonia into urea cycle resulting in hyperammonemia (>80 mcg/dl)    Potential toxic dose  1  >200 mg/kg risk of CNS depression  2  >400 mg/kg risk of multiorgan system toxicities  3  >750 mg/kg potentially lethal    Clinical Manifestation  1  Acute overdose  a  CNS - Lethargy, sedation progressing to coma with cerebral edema as severity of overdose increase (ataxia, nystagmus and tremors typical of other anticonvulsant toxicities are not usually seen)  b  Respiratory depression and miotic pupils can mimic opioid toxicity  c  Metabolic (in severe toxicity) - metabolic acidosis (high gap), hyperammonemia, hypernatremia, hypocalcemia  d  Hepatotoxicity  e   Bone marrow suppression at 3-5 days and usually resolves spontaneously  f  Others - renal insufficiency, pancreatitis, acute lung injury   g  CV - Tachycardia with QTc prolongation and hypotension have been reported  2  Valproate-induced hyperammonemic encephalopathy (VHE)  a  CNS dysfunction with raised ammonia level (Note that ammonia level is often raised in Valproate therapy, so CNS dysfunction should be present for this diagnosis)  b  Spectrum of CNS dysfunction from deterioration of mental function to coma; one case report of aggression as presentation  c  Only abnormal laboratory finding is that of high ammonia level  d  Is seen with both elevated (overdose) or normal (therapeutic) valproate level  e  May have concomitant hepatotoxicity  3  Valproate is a known human teratogen  Diagnosis  1  Valproate level should be measured (therapeutic level  mg/L) and followed until downward trend is established  2  Serum level >450 mg/L associated with drowsiness/obtundation  3  Serum level >850 mg/L associated with coma  4  Blood gas, liver function test, ammonia level, electrolytes  5  Valproate causes false elevation of urine ketones    Management  1  Resuscitation, symptomatic and supportive care in all patients  2  Acute overdose  a  Asymptomatic patients should have serum concentrations checked Q2-3 hours until [Va] has clearly peaked and downtrended  Immediate release requires 6 hours observation (2-3 lab draws) and extended release (divalproex) requires 12 hours observation (4-6 lab draws)  b  Multidose activated charcoal may be considered - valproate undergoes enterohepatic recirculation  c  L-Carnitine -  mg/kg followed by 50 mg/kg Q6-8 hours  3  VHE  a  Stop valproate  b  L-Carnitine -  mg/kg followed by 50 mg/kg Q6-8 hours  4  Hemodialysis and hemoperfusion is reserved for severe toxicity with failure of improvement or deterioration despite management especially with concomitant severe metabolic disturbance   HD is indicated for serum concentrations of greater than 850 mg/L    References  1  [place] [facility]  Anticonvulsants  In: Goldfranks Toxicologic Emergencies  8th ed  2  Valproic Acid  In: Poisoning and Drug Overdose  5th ed  3  Valproic Acid  In: Toxicology Handbook  [place] BAILEY, et  al  Valproate-associated Hyperammonemic Encephalopathy  J Am Board Fam Med 2007; 20: [phone])        4  Gonzalo Matt  Anticonvulsants Toxicity  [Updated 2019 Jan 7]  In: [employer] [Internet]  [place] [facility]): [employer]; 2019 Jan-  Available from: [employer]      Medical Toxicology Consult Discussion  BUPROPION  [place] [id number]    Bupropion is a unicyclic antidepressant structurally similar to amphetamines and diethylpropion  The mechanism of action is unclear but is thought to selectively inhibit neuronal reuptake of dopamine, norepinephrine, and possibly serotonin  Uses  Bupropion is an antidepressant and is approved as an aid in smoking cessation and in bulimia treatment  Due to its dopamine stimulating effect, bupropion may offer an advantage in the elderly with depression and dementia  Bupropion has also been suggested as therapy for children with attention deficit hyperactive disorder (ADHD)    Clinical Symptoms  Three main systems are affected: central nervous system (CNS), cardiovascular (CV), and the gastrointestinal tract (GI)  The greatest concern lies with Bupropions propensity to cause seizures in therapeutic doses as well as overdoses  Sustained release products further complicate the clinical picture because toxicity may be delayed  1  CNS - Tremors and seizures are the most likely effects in acute overdose, occurring within 1 to 4 hours following ingestion and later for sustained-release preparations (up to 10-20 hours postingestion)    The incidence of seizures with therapeutic doses of Bupropion (450 mg/day) in the -supported trial was 0 4 - 0 8%, and may be as high as 20% in overdose  The seizures are presumably due to the dopamine and norepinephrine reuptake inhibition  Patients may also manifest agitation due to the CNS stimulation  Other CNS effects seen in overdose include paresthesias, light-headedness, lethargy and confusion  Case reports state that Bupropion-induced seizures generally present as 1-2 (occasionally 3), tonic-clonic, brief (< 1 minute) seizures and resolve spontaneously  Status epilepticus has been reported  In one study, most patients developed a prodrome of persistent neurologic symptoms (i e , agitation, tremors, and hallucinations) prior to having a seizure  In a review of 7,348 Bupropion-only exposures reported through the [employer] of [address] [place] [address] in 1771-88, seizures occurred in 6% of cases, including 15% of intentional exposures and less than 1% of unintentional exposures  Seizures were more common after exposure to immediate release formulations than sustained release (22% vs  16%), were multiple in 36% of such cases, and progressed to status epilepticus in 5%  Clinical effects lasted for 8-24 hours in 40% of cases and for > 24 hours in 12%  Moderate to major effects were reported in 17% of Wellbutrin, 12% of Wellbutrin SR and 9% of Zyban exposures  Clinical effects developed in 60% of intentional exposures, and in only 9% of exposures in children below the age of six (91% considered minor)  There were five deaths (0 7%), all involving suspected suicide  2  CV - Sinus tachycardia is common in overdose  Hypertension may occur, and rarely, intraventricular conduction delays  3  GI - Nausea and vomiting may occur  Pharmacokinetics   Bupropion is well absorbed orally with peak plasma levels in about 2 hours for immediate release tablets and 5 hours for sustained release  Active metabolites are formed and yield peak plasma levels about 6 hours postingestion    The elimination half-life for Bupropion in therapeutic doses is 4-24 hours, while the metabolites have half-lives that range from 9 to 43 hours  One report stated that in overdose, Bupropion has a half-life of 9-19 hours  This may explain the heightened risk for delayed seizure activity  Bupropion has a large volume of distribution and high protein binding, thus making it less amenable to hemodialysis  Laboratory and Diagnostic Studies  Obtain serum electrolytes  An ECG and cardiac monitoring may be necessary  Monitor for seizures and mental status changes  A urine drug screen may be helpful in ruling out coingestants  Blood and urine assays are not routinely available to detect Bupropion  Range of Toxicity  The  removed Bupropion from the market in 1986 because seizures occurred in patients taking daily doses in the range of 400-600mg  It was reintroduced in 1989 with a lower dosage schedule, and considered contraindicated in patients with a history of seizures or eating disorders  Even at the lower recommended therapeutic doses, seizures have been reported in patients without seizure disorders  In overdose, the incidence of seizures rises to approximately 20%  There are reports of patients ingesting 9 grams having seizures, and surviving  In 12 of 13 overdoses reported during clinical trials, patients ingested 850 to 4200 mg and recovered without significant sequelae  Delayed seizures may occur 10-20 hours postingestion  Treatment  Treatment is supportive with attention to the airway, breathing, and circulation concerns  Treat seizures with IV benzodiazepines  If the patient presents early, has a large ingestion, has comorbidities, and is alert, consider giving activated charcoal   Dialysis is not helpful due to the large volume of distribution and high protein binding  Disposition  Given the risk for delayed seizures, observation and admission are recommended for 24 hours postingestion  Referral to psychiatric services is necessary for intentional self-harm ingestions  References:  Poisindex 2007  Micromedex 2007 (Emergency Medical Abstracts)  chase Cortes, [facility] 36(0):152, 2002  Bupropion exposures: Clinical manifestation and medical outcome  chase Jordan, [facility] 27(2):147, 2004  Intentional Bupropion overdoses  Up to Date 2007  Stout 2007  [address] 2006      Hx and PE limited by the dynamics of a phone consultation  I have not personally interviewed or evaluated the patient, but only advised based on the information provided to me  Primary provider is responsible for all clinical decisions  Pertinent history, physical exam and clinical findings and course discussed: Kathryn Meadwos is a 49y o  year old female who presents with intentional overdose of her valproic acid  Past medical history significant for bipolar disorder, anxiety, hypertension  Patient reports that she took a large amount of valproic acid over an hour ago in addition to 2-3 lines of methamphetamine  Patient regretted her decision and called 911  On arrival to the emergency department, the patient has no specific evidence of toxidrome on physical exam   Vital signs are within normal limits  Patient has no physical complaints  Review of systems and physical exam not performed by me      Historical Information   Past Medical History:   Diagnosis Date    Anxiety     Arthritis     oa cassandra knees    Asthma     good control- no medications    Yan's esophagus     Bipolar affective disorder (HCC)     Chronic pain disorder     lumbar    CPAP (continuous positive airway pressure) dependence     Degenerative disc disease at L5-S1 level     Deliberate self-cutting     Depression 09/16/2008    Disease of thyroid gland     hypo    MARTINEZ (dyspnea on exertion)     Drug overdose 10/28/2008    suicide attempt    Dysphagia     Dyspnea     Edema     BLE    GERD (gastroesophageal reflux disease)     High blood pressure     History of COVID-19 12/30/2020    Symptoms started on 12/30/2020 and then got worse  Today she is feeling a little bit better  She is a candidate for monoclonal antibody infusion and set for 1/6/21 @ 1pm at [facility]  01/07/21 - telemedicine -     Hypertension     Knee pain, bilateral     Especially right    Migraines     Obese     Overactive bladder     Sjogren's disease ([address] )     Sleep apnea     Stress incontinence     Suicidal ideations     Umbilical hernia     surgical repair today 4/24/2019    Use of cane as ambulatory aid     awaiting b/l knee replacement    Wears glasses      Past Surgical History:   Procedure Laterality Date    BREAST CYST EXCISION Right 1989    CARPAL TUNNEL RELEASE Left     CHOLECYSTECTOMY  05/2003    Laparoscopic    COLONOSCOPY      01/12/2009    DILATION AND CURETTAGE OF UTERUS      ELBOW SURGERY Left     x2   No hardware    ESOPHAGOGASTRODUODENOSCOPY      FOOT SURGERY Left     Plantar fasciotomy    HYSTERECTOMY      laporoscopic, partial    KNEE ARTHROSCOPY Bilateral     OOPHORECTOMY Left 10/2015    MT ANAL SPHINCTEROTOMY N/A 08/31/2018    Procedure: EUA, LEFT PARTIAL INTERNAL SPHINCTEROTOMY;  Surgeon: Bridget Wolf MD;  Location: [address] [place] [address] MAIN OR;  Service: Colorectal    MT GASTROCNEMIUS RECESSION Left 02/24/2021    Procedure: RECESSION GASTROC OPEN;  Surgeon: Merle Mackey DPM;  Location: [address] [place] [address] MAIN OR;  Service: [facility] 38 FPPB,4+B/J,HHOEE N/A 04/24/2019    Procedure: REPAIR HERNIA UMBILICAL LAPAROSCOPIC W/ ROBOTICS;  Surgeon: Jayda Carreon MD;  Location: [facility] Main OR;  Service: General    REDUCTION MAMMAPLASTY Bilateral 1999    REPAIR LACERATION Left     left hand-5/18/2009    REPLACEMENT TOTAL KNEE Right     ROTATOR CUFF REPAIR Left     TONSILECTOMY AND ADNOIDECTOMY      TONSILLECTOMY      US GUIDED MSK PROCEDURE  04/22/2021    VEIN LIGATION Bilateral     WISDOM TOOTH EXTRACTION Social History   Social History     Substance and Sexual Activity   Alcohol Use Not Currently    Comment: Recovering alcoholic     Social History     Substance and Sexual Activity   Drug Use Yes    Types: Marijuana, Methamphetamines    Comment: medical- vapes 3 times per wk at hs     Social History     Tobacco Use   Smoking Status Former Smoker    Packs/day: 2 00    Years: 33 00    Pack years: 66 00    Types: Cigarettes    Start date: 5    Quit date: 2018    Years since quittin 5   Smokeless Tobacco Never Used   Tobacco Comment    Started at age 14  Family History   Problem Relation Age of Onset    Kidney cancer Mother     Diabetes Mother     No Known Problems Father     No Known Problems Sister     No Known Problems Maternal Grandmother     No Known Problems Maternal Grandfather     No Known Problems Paternal Grandmother     No Known Problems Paternal Grandfather     Colon cancer Maternal Uncle     Colon cancer Maternal Uncle     Colon cancer Paternal Uncle     Colon cancer Family         Prior to Admission medications    Medication Sig Start Date End Date Taking?  Authorizing Provider   amLODIPine (NORVASC) 5 mg tablet TAKE 1 TABLET (5 MG TOTAL) BY MOUTH DAILY 22  Yes JHONATAN Lockwood   buPROPion (WELLBUTRIN XL) 150 mg 24 hr tablet Take 150 mg by mouth daily   Yes Historical Provider, MD   cetirizine (ZyrTEC) 10 mg tablet  22  Yes Historical Provider, MD   Cholecalciferol (Vitamin D3) 125 MCG (5000 UT) CAPS  22  Yes Historical Provider, MD   colestipol (COLESTID) 1 g tablet Take 1 tablet (1 g total) by mouth 2 (two) times a day 1/10/22 7/13/22 Yes Paul Roach PA-C   estradiol (ESTRACE) 0 5 MG tablet TAKE 1 TABLET (0 5 MG TOTAL) BY MOUTH DAILY 22  Yes Gloria Sesay DO   Fesoterodine Fumarate ER (Toviaz) 4 MG TB24 Take 1 tablet by mouth daily   Yes Historical Provider, MD   furosemide (LASIX) 20 mg tablet TAKE 1 TABLET BY MOUTH DAILY 22 Yes JHONATAN Lockwood   Latuda 20 MG tablet  6/22/22  Yes Historical Provider, MD   levothyroxine 100 mcg tablet Take 1 tablet (100 mcg total) by mouth daily in the early morning 5/26/22 7/13/22 Yes JHONATAN Lockwood   losartan (COZAAR) 50 mg tablet TAKE ONE TABLET BY MOUTH DAILY 5/24/22  Yes JHONATAN Castro   lurasidone 60 MG TABS Take 1 tablet (60 mg total) by mouth daily with dinner  Patient taking differently: Take 60 mg by mouth daily with dinner 1/10/22 7/13/22 Yes Paul Roach PA-C   metoprolol tartrate (LOPRESSOR) 25 mg tablet Take 1 tablet (25 mg total) by mouth every 12 (twelve) hours 3/24/22 7/13/22 Yes JHONATAN Lockwood   pilocarpine (SALAGEN) 5 mg tablet Take 5 mg by mouth 3 (three) times a day   Yes Historical Provider, MD   RABEprazole (ACIPHEX) 20 MG tablet Take 20 mg by mouth 2 (two) times a day   Yes Historical Provider, MD   ramelteon (ROZEREM) 8 mg tablet Take 1 tablet (8 mg total) by mouth daily at bedtime 7/11/22  Yes Adrien Wells DO   topiramate (TOPAMAX) 100 mg tablet Take 1 tablet (100 mg total) by mouth 2 (two) times a day 1/10/22 7/13/22 Yes Paul Roach PA-C   topiramate (TOPAMAX) 50 MG tablet  6/22/22  Yes Historical Provider, MD   venlafaxine [facility]) 37 5 mg tablet Take 37 5 mg by mouth in the morning 5/25/22  Yes Historical Provider, MD   albuterol (Ventolin HFA) 90 mcg/act inhaler Inhale 2 puffs every 6 (six) hours as needed for wheezing 6/20/22   JHONATAN Lockwood   buPROPion (WELLBUTRIN XL) 300 mg 24 hr tablet Take 1 tablet (300 mg total) by mouth daily  Patient taking differently: Take 450 mg by mouth daily 1/10/22 6/13/22  Paul Roach PA-C   Cholecalciferol (Vitamin D3) 125 MCG (5000 UT) TABS Take 5,000 Units by mouth daily    Historical Provider, MD   desonide (DESOWEN) 0 05 % ointment  5/13/22   Historical Provider, MD   divalproex sodium (DEPAKOTE ER) 250 mg 24 hr tablet Take 3 tablets (750 mg total) by mouth daily  Patient taking differently: Take 1,000 mg by mouth daily 1/10/22 6/13/22  Melody Alvarado PA-C   divalproex sodium (DEPAKOTE ER) 500 mg 24 hr tablet Take 1,000 mg by mouth daily    Historical Provider, MD   fluticasone (FLOVENT HFA) 110 MCG/ACT inhaler Inhale 2 puffs 2 (two) times a day Rinse mouth after use  Historical Provider, MD   haloperidol decanoate (Haldol Decanoate) 50 mg/mL injection Inject 50 mg into a muscle every 30 (thirty) days 11/4/21   Historical Provider, MD   olopatadine (PATANOL) 0 1 % ophthalmic solution  5/13/22   Historical Provider, MD   rOPINIRole (REQUIP) 0 5 mg tablet  5/25/22   Historical Provider, MD   traMADol [facility]) 50 mg tablet  6/17/22   Historical Provider, MD   triamcinolone (KENALOG) 0 1 % cream Apply topically 2 (two) times a day  Patient not taking: Reported on 7/11/2022 3/30/22   JHONATAN Diana   venlafaxine [facility] ) 75 mg 24 hr capsule Take 1 capsule by mouth in the morning  Patient not taking: Reported on 6/13/2022 1/26/22   Historical Provider, MD   estradiol (Estrace) 0 5 MG tablet Take 1 tablet (0 5 mg total) by mouth daily 4/20/22 7/13/22  Cooper Kim, DO       No current facility-administered medications for this encounter  Allergies   Allergen Reactions    Percocet [Oxycodone-Acetaminophen] Headache     Severe headaches   (denies issues with Tylenol)    Povidone Iodine Rash     Unsure if betadine or gauze post surgical  Got rash on leg  Has  Had itchy rashes after every surgery prep and IV insertion    Chlorhexidine Rash       Objective     No intake or output data in the 24 hours ending 07/13/22 1950    Invasive Devices:   Peripheral IV 07/13/22 Left Forearm (Active)   Site Assessment WDL; Clean;Dry; Intact 07/13/22 1517   Dressing Type Transparent 07/13/22 1517   Line Status Blood return noted; Flushed 07/13/22 1517   Dressing Status Clean;Dry; Intact 07/13/22 1517       Vitals   Vitals:    07/13/22 1600 07/13/22 1630 07/13/22 1814 07/13/22 1855   BP: 122/71 122/63 137/63 147/84   TempSrc:       Pulse: 58 60 69 75   Resp: 16 16 16 16   Patient Position - Orthostatic VS: Lying Lying Lying Lying   Temp:             EKG, Pathology, and/or Other Studies: I have personally reviewed pertinent reports  Normal sinus rhythm, 61, first-degree AV block noted , QRS 84, , no terminal R    Lab Results: I have personally reviewed pertinent reports  Labs:    Results from last 7 days   Lab Units 07/13/22  1520   WBC Thousand/uL 11 56*   HEMOGLOBIN g/dL 14 6   HEMATOCRIT % 45 5   PLATELETS Thousands/uL 177   NEUTROS PCT % 58   LYMPHS PCT % 28   MONOS PCT % 11      Results from last 7 days   Lab Units 07/13/22  1520   SODIUM mmol/L 139   POTASSIUM mmol/L 4 2   CHLORIDE mmol/L 105   CO2 mmol/L 24   BUN mg/dL 12   CREATININE mg/dL 0 64   CALCIUM mg/dL 9 0   ALK PHOS U/L 65   ALT U/L 20   AST U/L 14              No results found for: TROPONINI      Results from last 7 days   Lab Units 07/13/22  1520   ACETAMINOPHEN LVL ug/mL <10*   ETHANOL LVL mg/dL <60   SALICYLATE LVL mg/dL <5     Invalid input(s): EXTPREGUR      Imaging Studies: I have personally reviewed pertinent reports  Counseling / Coordination of Care  Total time spent today 55 minutes  This was a phone consultation

## 2022-07-13 NOTE — ED NOTES
RN spoke with patient's father per her request  She is very upset because her cat is home alone and wants to make sure that her father will feed the cat  RN spoke with the patient that her father will take care of her cat        Rosa Child, RN  07/13/22 3509

## 2022-07-13 NOTE — ASSESSMENT & PLAN NOTE
Present on admission  Patient noted feeling no symptoms  Currently untreated  Patient has a 70 year pack history quit 5 years ago  PLAN  Monitor SpO2 and RR as Valproic acid OD can suppress respiratory drive

## 2022-07-13 NOTE — H&P
[address]line 44 1970, 49 y o  female MRN: [id number]  Unit/Bed#: ED 15 Encounter: [id number]  Primary Care Provider: JHONATAN Tracey   Date and time admitted to hospital: 7/13/2022  2:53 PM    * Suicidal deliberate poisoning [facility])  Assessment & Plan  Patient noted taking 14 pills of dapekote at 14:00 on 13JUL2022 as well as medical marajuana and methylphenidate (purchased on street) subsequently  called EMS and was brought to ED  Dr Willem Wyatt psychiatrist Sunday Rodrigez prescribes the dapekote for schizoaffective disorder bipolar unknown type  Patient has not eat today  Patient is tired, had chest pain which has subsided  Patient endorses H/A is tearful and initally was noncommunicative  SOCIAL: 5 years since last 2 packs70 pack year  No alcohol  Eddie Locus couple hits each night  Bathsalts was sliped the salts took unintentially  Meth amphetamines snort 2-3 most days from street  PLAN:  Toxicology Recommendation:  1  Valproic Acid Level: Trend q2-3hrs until level peaks and they downtrend x 2: Last Lab 130 (elevated) at 16:34  2  Ammonia Level: Lab ordered 18:56 [id number]  3  Seizure precautions for 24hrs since ingestion (Pt reports ingestion at 14:00 [id number]    4  IF patient becomes tachycardia Serial EKG monitoring for QRS widening: IF QRS wide treat as follows  -QRS>120: give 1-2 mEq/kg NaBicarb bolus & Titrate until pH<7 55 K>4 0  GOAL ATc<120    One on one observation  Psych Consult  IVF  ABG    Schizoaffective disorder, bipolar type [facility])  Assessment & Plan  Present on admission  Risk factor for SI, self harm, and attention seeking  PLAN  Psych Consult would be appreciated  Please reconcile home medication as list is extensive and patient admits doubts as the medications are prepared for them       Major depressive disorder  Assessment & Plan  Present on admission  PLAN  Psych Consult would be appreciated  Please reconcile home medication as list is extensive and patient admits doubts as the medications are prepared for them  Mood disorder [facility])  Assessment & Plan  Present on admission   PLAN  Psych Consult would be appreciated  Please reconcile home medication as list is extensive and patient admits doubts as the medications are prepared for them  Substance abuse [facility])  Assessment & Plan  Present on admission   PLAN  Psych Consult would be appreciated      Potential for cognitive impairment  Assessment & Plan  Present on admission   PLAN  Psych Consult would be appreciated  Formal testing should be considered  COPD (chronic obstructive pulmonary disease) ([address] )  Assessment & Plan  Present on admission  Patient noted feeling no symptoms  Currently untreated  Patient has a 70 year pack history quit 5 years ago  PLAN  Monitor SpO2 and RR as Valproic acid OD can suppress respiratory drive  Hypothyroidism  Assessment & Plan  Present on admission  PLAN  Continue home medications     Benign essential hypertension  Assessment & Plan  Present on admission   PLAN   Continue home medications      MAG (obstructive sleep apnea)  Assessment & Plan  Patient noted needing a CPAP if placed inpatient  PLAN  Order CPAP    Overactive bladder  Assessment & Plan  Present on admission   PLAN   Continue home medications      Mixed hyperlipidemia  Assessment & Plan  Present on admission   PLAN   Continue home medications      VTE Pharmacologic Prophylaxis: VTE Score: 7 High Risk (Score >/= 5) - Pharmacological DVT Prophylaxis Ordered: enoxaparin (Lovenox)  Sequential Compression Devices Ordered  Code Status: Prior Code status should not be changed w/ patient being admitted for attempted suicide  Discussion with family: ACT manger was contacted and spoke with nurse  Anticipated Length of Stay: Patient will be admitted on an inpatient basis with an anticipated length of stay of greater than 2 midnights secondary to Attempted suicide via OD      Chief Complaint: "Hate my life tried to kill myself"    History of Present Illness:  Cira Anderson is a 49 y o  female with a complex psychiatric PMH including multiple suicide attempts, self cutting behavior, Schizoaffective disorder, bipolar type, mood disorder, major depressive disorder, polysubstance abuse disorder, and potentially cognitive impairment who was brought to the ED by EMS due to Valproic Acid OD  Pt has a history of multiple suicide attempts including risperidone OD admission 10 years ago, a self afficiation with a hospital bandage while in a hospital for bath salt OD, and "many others"  Patient started cutting when they were 15 after the death of their grandmother  Patient stated they took 14 pills of Depakote, medical mariajuana, and street methylphenidate at 14:00 on 13JUL2022 when they called EMS  Dr Desriee Funes psychiatrist [facility] prescribes the dapekote for schizoaffective disorder bipolar type  Patient said this attempt was triggerd by missing their mother who passed away earlier this year and a father that is no longer involved  Patient has been feeling alone and depressed  Pt also noted frustration w/ ACT case marianna that is leaving, (they are tired of different ), and hospitals not taking their issues seriously (stiches from previous injury left in too long scared hand wound will result in same)  Patient endorsed hateing their life and wanting to end it   Patient has not eat today, is tired, had chest pain which has subsided  Patient endorses H/A is tearful and initally was noncommunicative  SOCIAL: 5 years since last 2 packs70 pack year  No alcohol  Debria Felt couple hits each night  Bathsalts was sliped the salts took unintentially  Meth amphetamines snort 2-3 most days from street  Review of Systems:  Review of Systems   Constitutional: Positive for fatigue  HENT: Negative for drooling  Respiratory: Positive for shortness of breath      Gastrointestinal: Positive for vomiting  Negative for nausea  Musculoskeletal: Positive for back pain  Neurological: Positive for headaches  Negative for seizures and speech difficulty  Psychiatric/Behavioral: Positive for self-injury, sleep disturbance and suicidal ideas  Past Medical and Surgical History:   Past Medical History:   Diagnosis Date    Anxiety     Arthritis     oa cassandra knees    Asthma     good control- no medications    Yan's esophagus     Bipolar affective disorder (HCC)     Chronic pain disorder     lumbar    CPAP (continuous positive airway pressure) dependence     Degenerative disc disease at L5-S1 level     Deliberate self-cutting     Depression 09/16/2008    Disease of thyroid gland     hypo    MARTINEZ (dyspnea on exertion)     Drug overdose 10/28/2008    suicide attempt    Dysphagia     Dyspnea     Edema     BLE    GERD (gastroesophageal reflux disease)     High blood pressure     History of COVID-19 12/30/2020    Symptoms started on 12/30/2020 and then got worse  Today she is feeling a little bit better  She is a candidate for monoclonal antibody infusion and set for 1/6/21 @ 1pm at [facility]  01/07/21 - telemedicine -     Hypertension     Knee pain, bilateral     Especially right    Migraines     Obese     Overactive bladder     Sjogren's disease ([address] )     Sleep apnea     Stress incontinence     Suicidal ideations     Umbilical hernia     surgical repair today 4/24/2019    Use of cane as ambulatory aid     awaiting b/l knee replacement    Wears glasses        Past Surgical History:   Procedure Laterality Date    BREAST CYST EXCISION Right 1989    CARPAL TUNNEL RELEASE Left     CHOLECYSTECTOMY  05/2003    Laparoscopic    COLONOSCOPY      01/12/2009    DILATION AND CURETTAGE OF UTERUS      ELBOW SURGERY Left     x2   No hardware    ESOPHAGOGASTRODUODENOSCOPY      FOOT SURGERY Left     Plantar fasciotomy    HYSTERECTOMY      laporoscopic, partial    KNEE ARTHROSCOPY Bilateral     OOPHORECTOMY Left 10/2015    MO ANAL SPHINCTEROTOMY N/A 08/31/2018    Procedure: EUA, LEFT PARTIAL INTERNAL SPHINCTEROTOMY;  Surgeon: Nicole Stevenson MD;  Location: [address] [place] [address] MAIN OR;  Service: Colorectal    MO GASTROCNEMIUS RECESSION Left 02/24/2021    Procedure: RECESSION GASTROC OPEN;  Surgeon: Manas Richardson DPM;  Location: [address] [place] [address] MAIN OR;  Service: [place] GDMK,5+H/S,BHSKR N/A 04/24/2019    Procedure: REPAIR HERNIA UMBILICAL LAPAROSCOPIC W/ ROBOTICS;  Surgeon: Pam Garcia MD;  Location: [facility] Main OR;  Service: General    REDUCTION MAMMAPLASTY Bilateral 1999    REPAIR LACERATION Left     left hand-5/18/2009    REPLACEMENT TOTAL KNEE Right     ROTATOR CUFF REPAIR Left     TONSILECTOMY AND ADNOIDECTOMY      TONSILLECTOMY      US GUIDED MSK PROCEDURE  04/22/2021    VEIN LIGATION Bilateral     WISDOM TOOTH EXTRACTION         Meds/Allergies:  Prior to Admission medications    Medication Sig Start Date End Date Taking?  Authorizing Provider   amLODIPine (NORVASC) 5 mg tablet TAKE 1 TABLET (5 MG TOTAL) BY MOUTH DAILY 5/18/22  Yes JHONATAN Leo   buPROPion (WELLBUTRIN XL) 150 mg 24 hr tablet Take 150 mg by mouth daily   Yes Historical Provider, MD   cetirizine (ZyrTEC) 10 mg tablet  5/13/22  Yes Historical Provider, MD   Cholecalciferol (Vitamin D3) 125 MCG (5000 UT) CAPS  5/25/22  Yes Historical Provider, MD   colestipol (COLESTID) 1 g tablet Take 1 tablet (1 g total) by mouth 2 (two) times a day 1/10/22 7/13/22 Yes Zoe Suresh PA-C   estradiol (ESTRACE) 0 5 MG tablet TAKE 1 TABLET (0 5 MG TOTAL) BY MOUTH DAILY 7/13/22  Yes Lamberto Lopez DO   Fesoterodine Fumarate ER (Toviaz) 4 MG TB24 Take 1 tablet by mouth daily   Yes Historical Provider, MD   furosemide (LASIX) 20 mg tablet TAKE 1 TABLET BY MOUTH DAILY 6/22/22  Yes JHONATAN Leo   Latuda 20 MG tablet  6/22/22  Yes Historical Provider, MD   levothyroxine 100 mcg tablet Take 1 tablet (100 mcg total) by mouth daily in the early morning 5/26/22 7/13/22 Yes JHONATAN Tracey   losartan (COZAAR) 50 mg tablet TAKE ONE TABLET BY MOUTH DAILY 5/24/22  Yes JHONATAN Davies   lurasidone 60 MG TABS Take 1 tablet (60 mg total) by mouth daily with dinner  Patient taking differently: Take 60 mg by mouth daily with dinner 1/10/22 7/13/22 Yes Veronica Pollack PA-C   metoprolol tartrate (LOPRESSOR) 25 mg tablet Take 1 tablet (25 mg total) by mouth every 12 (twelve) hours 3/24/22 7/13/22 Yes JHONATAN Tracey   pilocarpine (SALAGEN) 5 mg tablet Take 5 mg by mouth 3 (three) times a day   Yes Historical Provider, MD   RABEprazole (ACIPHEX) 20 MG tablet Take 20 mg by mouth 2 (two) times a day   Yes Historical Provider, MD   ramelteon (ROZEREM) 8 mg tablet Take 1 tablet (8 mg total) by mouth daily at bedtime 7/11/22  Yes Finn Maki DO   topiramate (TOPAMAX) 100 mg tablet Take 1 tablet (100 mg total) by mouth 2 (two) times a day 1/10/22 7/13/22 Yes Veronica Pollack PA-C   topiramate (TOPAMAX) 50 MG tablet  6/22/22  Yes Historical Provider, MD   venlafaxine [facility]) 37 5 mg tablet Take 37 5 mg by mouth in the morning 5/25/22  Yes Historical Provider, MD   albuterol (Ventolin HFA) 90 mcg/act inhaler Inhale 2 puffs every 6 (six) hours as needed for wheezing 6/20/22   JHONATAN Tracey   buPROPion (WELLBUTRIN XL) 300 mg 24 hr tablet Take 1 tablet (300 mg total) by mouth daily  Patient taking differently: Take 450 mg by mouth daily 1/10/22 6/13/22  Veronica Pollack PA-C   Cholecalciferol (Vitamin D3) 125 MCG (5000 UT) TABS Take 5,000 Units by mouth daily    Historical Provider, MD   desonide (DESOWEN) 0 05 % ointment  5/13/22   Historical Provider, MD   divalproex sodium (DEPAKOTE ER) 250 mg 24 hr tablet Take 3 tablets (750 mg total) by mouth daily  Patient taking differently: Take 1,000 mg by mouth daily 1/10/22 6/13/22  Moshe Alvarado PA-C   divalproex sodium (DEPAKOTE ER) 500 mg 24 hr tablet Take 1,000 mg by mouth daily    Historical Provider, MD   fluticasone (FLOVENT HFA) 110 MCG/ACT inhaler Inhale 2 puffs 2 (two) times a day Rinse mouth after use  Historical Provider, MD   haloperidol decanoate (Haldol Decanoate) 50 mg/mL injection Inject 50 mg into a muscle every 30 (thirty) days 11/4/21   Historical Provider, MD   olopatadine (PATANOL) 0 1 % ophthalmic solution  5/13/22   Historical Provider, MD   rOPINIRole (REQUIP) 0 5 mg tablet  5/25/22   Historical Provider, MD   traMADol Yvette Mince) 50 mg tablet  6/17/22   Historical Provider, MD   triamcinolone (KENALOG) 0 1 % cream Apply topically 2 (two) times a day  Patient not taking: Reported on 7/11/2022 3/30/22   JHONATAN Tracey   venlafaxine [facility] ) 75 mg 24 hr capsule Take 1 capsule by mouth in the morning  Patient not taking: Reported on 6/13/2022 1/26/22   Historical Provider, MD   estradiol (Estrace) 0 5 MG tablet Take 1 tablet (0 5 mg total) by mouth daily 4/20/22 7/13/22  Drury Jeans,      I have reviewed home medications with patient personally  Allergies: Allergies   Allergen Reactions    Percocet [Oxycodone-Acetaminophen] Headache     Severe headaches   (denies issues with Tylenol)    Povidone Iodine Rash     Unsure if betadine or gauze post surgical  Got rash on leg     Has  Had itchy rashes after every surgery prep and IV insertion    Chlorhexidine Rash       Social History:  Marital Status: Single   Occupation: Works with disabled persons  Patient Pre-hospital Living Situation: Assisted Living  Patient Pre-hospital Level of Mobility: walks  Patient Pre-hospital Diet Restrictions:N/A  Substance Use History:   Social History     Substance and Sexual Activity   Alcohol Use Not Currently    Comment: Recovering alcoholic     Social History     Tobacco Use   Smoking Status Former Smoker    Packs/day: 2 00    Years: 33 00    Pack years: 66 00    Types: Cigarettes    Start date: 5    Quit date: 1/2/2018    Years since quitting: 4 5   Smokeless Tobacco Never Used   Tobacco Comment    Started at age 14  Social History     Substance and Sexual Activity   Drug Use Yes    Types: Marijuana, Methamphetamines    Comment: medical- vapes 3 times per wk at hs       Family History:  Family History   Problem Relation Age of Onset   Yannick Garcia Kidney cancer Mother     Diabetes Mother     No Known Problems Father     No Known Problems Sister     No Known Problems Maternal Grandmother     No Known Problems Maternal Grandfather     No Known Problems Paternal Grandmother     No Known Problems Paternal Grandfather     Colon cancer Maternal Uncle     Colon cancer Maternal Uncle     Colon cancer Paternal Uncle     Colon cancer Family        Physical Exam:     Vitals:   Blood Pressure: 147/84 (07/13/22 1855)  Pulse: 75 (07/13/22 1855)  Temperature: 98 °F (36 7 °C) (07/13/22 1537)  Temp Source: Oral (07/13/22 1537)  Respirations: 16 (07/13/22 1855)  SpO2: 96 % (07/13/22 1855)    Physical Exam  Constitutional:       Appearance: She is obese  HENT:      Head: Normocephalic and atraumatic  Nose: Nose normal       Mouth/Throat:      Mouth: Mucous membranes are moist       Pharynx: No oropharyngeal exudate  Eyes:      General: No scleral icterus  Right eye: No discharge  Left eye: No discharge  Conjunctiva/sclera: Conjunctivae normal       Pupils: Pupils are equal, round, and reactive to light  Cardiovascular:      Rate and Rhythm: Normal rate  Pulses: Normal pulses  Pulmonary:      Effort: Pulmonary effort is normal    Abdominal:      Palpations: Abdomen is soft  Neurological:      Mental Status: She is alert  Psychiatric:         Attention and Perception: She is attentive  She does not perceive auditory or visual hallucinations  Mood and Affect: Mood is depressed  Mood is not anxious or elated  Affect is tearful  Affect is not labile, blunt, flat, angry or inappropriate  Speech: She is communicative  Speech is not rapid and pressured, delayed, slurred or tangential          Behavior: Behavior is not agitated, slowed, aggressive, withdrawn, hyperactive or combative  Behavior is cooperative  Thought Content: Thought content is not paranoid or delusional  Thought content includes suicidal ideation  Thought content does not include homicidal ideation  Thought content includes suicidal plan  Thought content does not include homicidal plan  Cognition and Memory: Cognition is not impaired  Memory is not impaired  She does not exhibit impaired recent memory or impaired remote memory  Judgment: Judgment is not impulsive or inappropriate  Comments: No signs of psychotic episode, geovany, delirium were noted          Additional Data:     Lab Results:  Results from last 7 days   Lab Units 07/13/22  1520   WBC Thousand/uL 11 56*   HEMOGLOBIN g/dL 14 6   HEMATOCRIT % 45 5   PLATELETS Thousands/uL 177   NEUTROS PCT % 58   LYMPHS PCT % 28   MONOS PCT % 11   EOS PCT % 2     Results from last 7 days   Lab Units 07/13/22  1520   SODIUM mmol/L 139   POTASSIUM mmol/L 4 2   CHLORIDE mmol/L 105   CO2 mmol/L 24   BUN mg/dL 12   CREATININE mg/dL 0 64   ANION GAP mmol/L 10   CALCIUM mg/dL 9 0   ALBUMIN g/dL 3 9   TOTAL BILIRUBIN mg/dL 0 28   ALK PHOS U/L 65   ALT U/L 20   AST U/L 14   GLUCOSE RANDOM mg/dL 90                       Imaging: No pertinent imaging reviewed  No orders to display       EKG and Other Studies Reviewed on Admission:   · EKG: EKG noted low QRS but no prolongation  ** Please Note: This note has been constructed using a voice recognition system   **

## 2022-07-13 NOTE — ED PROVIDER NOTES
History  Chief Complaint   Patient presents with    Overdose - Intentional     Patient took 14 tablets of depakote  Patient also took 2-3 lines of meth  She states she does meth and smokes marijuana every day  Patient has had suicidal thoughts of hurting herself for the last few weeks  She has had past attempts as well  She states that her mom passed away last august which is a trigger for her, but she said that 4 years ago around this time is when she had a serious suicide attempt by taking some bath salts and putting and ace bandage around her neck  This is a 60-year-old female with prior suicidal attempt and past medical history of bipolar depression,anxiety and hypertension who was brought to ED  by EMS after she took 14 tablets of Depakote about an hour ago  Patient states she also took 2-3 lines of meth  Patient has a history of suicide  Attempt 4 years ago when she took  bath salts and rap an Ace bandage on her neck  Patient reports, she call 911 after taking the Depakote because  she does not want her cat to be alone  Patient states she lives alone and does not have a good relationship with her family  Patient denies nausea, vomiting , abdominal pain or headacke  Prior to Admission Medications   Prescriptions Last Dose Informant Patient Reported? Taking?    Cholecalciferol (Vitamin D3) 125 MCG (5000 UT) CAPS 7/13/2022 at 10:00 Self Yes Yes   Cholecalciferol (Vitamin D3) 125 MCG (5000 UT) TABS  Self Yes No   Sig: Take 5,000 Units by mouth daily   Fesoterodine Fumarate ER (Toviaz) 4 MG TB24 7/13/2022 at 10:00 Self Yes Yes   Sig: Take 1 tablet by mouth daily   Latuda 20 MG tablet 7/13/2022 at 10:00  Yes Yes   RABEprazole (ACIPHEX) 20 MG tablet 7/13/2022 at 10:00 Self Yes Yes   Sig: Take 20 mg by mouth 2 (two) times a day   albuterol (Ventolin HFA) 90 mcg/act inhaler More than a month at Unknown time  No No   Sig: Inhale 2 puffs every 6 (six) hours as needed for wheezing   amLODIPine (NORVASC) 5 mg tablet 7/13/2022 at morning Self No Yes   Sig: TAKE 1 TABLET (5 MG TOTAL) BY MOUTH DAILY   buPROPion (WELLBUTRIN XL) 150 mg 24 hr tablet 7/13/2022 at am Self Yes Yes   Sig: Take 150 mg by mouth daily   buPROPion (WELLBUTRIN XL) 300 mg 24 hr tablet  Self No No   Sig: Take 1 tablet (300 mg total) by mouth daily   Patient taking differently: Take 450 mg by mouth daily   cetirizine (ZyrTEC) 10 mg tablet   Yes Yes   colestipol (COLESTID) 1 g tablet  Self No Yes   Sig: Take 1 tablet (1 g total) by mouth 2 (two) times a day   desonide (DESOWEN) 0 05 % ointment   Yes No   divalproex sodium (DEPAKOTE ER) 250 mg 24 hr tablet  Self No No   Sig: Take 3 tablets (750 mg total) by mouth daily   Patient taking differently: Take 1,000 mg by mouth daily   divalproex sodium (DEPAKOTE ER) 500 mg 24 hr tablet  Self Yes No   Sig: Take 1,000 mg by mouth daily   estradiol (ESTRACE) 0 5 MG tablet 7/13/2022 at 10:00  No Yes   Sig: TAKE 1 TABLET (0 5 MG TOTAL) BY MOUTH DAILY   fluticasone (FLOVENT HFA) 110 MCG/ACT inhaler More than a month at Unknown time Self Yes No   Sig: Inhale 2 puffs 2 (two) times a day Rinse mouth after use     furosemide (LASIX) 20 mg tablet 7/13/2022 at 10:00  No Yes   Sig: TAKE 1 TABLET BY MOUTH DAILY   haloperidol decanoate (Haldol Decanoate) 50 mg/mL injection 7/4/2022 Self Yes No   Sig: Inject 50 mg into a muscle every 30 (thirty) days   levothyroxine 100 mcg tablet  Self No Yes   Sig: Take 1 tablet (100 mcg total) by mouth daily in the early morning   losartan (COZAAR) 50 mg tablet 7/13/2022 at 10:00 Self No Yes   Sig: TAKE ONE TABLET BY MOUTH DAILY   lurasidone 60 MG TABS  Self No Yes   Sig: Take 1 tablet (60 mg total) by mouth daily with dinner   Patient taking differently: Take 60 mg by mouth daily with dinner   metoprolol tartrate (LOPRESSOR) 25 mg tablet  Self No Yes   Sig: Take 1 tablet (25 mg total) by mouth every 12 (twelve) hours   olopatadine (PATANOL) 0 1 % ophthalmic solution   Yes No pilocarpine (SALAGEN) 5 mg tablet 7/13/2022 at 10:00 Self Yes Yes   Sig: Take 5 mg by mouth 3 (three) times a day   rOPINIRole (REQUIP) 0 5 mg tablet Unknown at Unknown time Self Yes No   ramelteon (ROZEREM) 8 mg tablet 7/13/2022 at 10:00  No Yes   Sig: Take 1 tablet (8 mg total) by mouth daily at bedtime   topiramate (TOPAMAX) 100 mg tablet  Self No Yes   Sig: Take 1 tablet (100 mg total) by mouth 2 (two) times a day   topiramate (TOPAMAX) 50 MG tablet 7/13/2022  Yes Yes   traMADol (ULTRAM) 50 mg tablet   Yes No   Patient not taking: No sig reported   triamcinolone (KENALOG) 0 1 % cream  Self No No   Sig: Apply topically 2 (two) times a day   Patient not taking: Reported on 7/11/2022   venlafaxine (EFFEXOR) 37 5 mg tablet 7/13/2022 at 10:00 Self Yes Yes   Sig: Take 37 5 mg by mouth in the morning   venlafaxine (EFFEXOR-XR) 75 mg 24 hr capsule  Self Yes No   Sig: Take 1 capsule by mouth in the morning   Patient not taking: Reported on 6/13/2022      Facility-Administered Medications: None       Past Medical History:   Diagnosis Date    Anxiety     Arthritis     oa cassandra knees    Asthma     good control- no medications    Yan's esophagus     Bipolar affective disorder (HCC)     Chronic pain disorder     lumbar    CPAP (continuous positive airway pressure) dependence     Degenerative disc disease at L5-S1 level     Deliberate self-cutting     Depression 09/16/2008    Disease of thyroid gland     hypo    MARTINEZ (dyspnea on exertion)     Drug overdose 10/28/2008    suicide attempt    Dysphagia     Dyspnea     Edema     BLE    GERD (gastroesophageal reflux disease)     High blood pressure     History of COVID-19 12/30/2020    Symptoms started on 12/30/2020 and then got worse  Today she is feeling a little bit better    She is a candidate for monoclonal antibody infusion and set for 1/6/21 @ 1pm [facility]  01/07/21 - telemedicine -     Hypertension     Knee pain, bilateral     Especially right    Migraines     Obese     Overactive bladder     Sjogren's disease ([address] )     Sleep apnea     Stress incontinence     Suicidal ideations     Umbilical hernia     surgical repair today 4/24/2019    Use of cane as ambulatory aid     awaiting b/l knee replacement    Wears glasses        Past Surgical History:   Procedure Laterality Date    BREAST CYST EXCISION Right 1989    CARPAL TUNNEL RELEASE Left     CHOLECYSTECTOMY  05/2003    Laparoscopic    COLONOSCOPY      01/12/2009    DILATION AND CURETTAGE OF UTERUS      ELBOW SURGERY Left     x2   No hardware    ESOPHAGOGASTRODUODENOSCOPY      FOOT SURGERY Left     Plantar fasciotomy    HYSTERECTOMY      laporoscopic, partial    KNEE ARTHROSCOPY Bilateral     OOPHORECTOMY Left 10/2015    MA ANAL SPHINCTEROTOMY N/A 08/31/2018    Procedure: EUA, LEFT PARTIAL INTERNAL SPHINCTEROTOMY;  Surgeon: Mary Rouse MD;  Location: [address] [place] [address] OR;  Service: Colorectal    MA GASTROCNEMIUS RECESSION Left 02/24/2021    Procedure: RECESSION GASTROC OPEN;  Surgeon: Valentín Marin DPM;  Location: [address] [place] [address];  Service: [place] [address] VSLD,9+X/T,NUIBI N/A 04/24/2019    Procedure: REPAIR HERNIA UMBILICAL LAPAROSCOPIC W/ ROBOTICS;  Surgeon: Tesfaye Brownlee MD;  Location: [facility] OR;  Service: General    REDUCTION MAMMAPLASTY Bilateral 1999    REPAIR LACERATION Left     left hand-5/18/2009    REPLACEMENT TOTAL KNEE Right     ROTATOR CUFF REPAIR Left     TONSILECTOMY AND ADNOIDECTOMY     [facility] PROCEDURE  04/22/2021    VEIN LIGATION Bilateral     WISDOM TOOTH EXTRACTION         Family History   Problem Relation Age of Onset    Kidney cancer Mother     Diabetes Mother     No Known Problems Father     No Known Problems Sister     No Known Problems Maternal Grandmother     No Known Problems Maternal Grandfather     No Known Problems Paternal Grandmother     No Known Problems Paternal Grandfather     Colon cancer Maternal Uncle     Colon cancer Maternal Uncle     Colon cancer Paternal Uncle     Colon cancer Family      I have reviewed and agree with the history as documented  E-Cigarette/Vaping    E-Cigarette Use Current Some Day User     Comments medical THC      E-Cigarette/Vaping Substances    Nicotine No     THC Yes     CBD No     Flavoring No     Other No     Unknown No      Social History     Tobacco Use    Smoking status: Former Smoker     Packs/day: 2 00     Years: 33 00     Pack years: 66 00     Types: Cigarettes     Start date: 5     Quit date: 2018     Years since quittin 5    Smokeless tobacco: Never Used    Tobacco comment: Started at age 14  Vaping Use    Vaping Use: Some days    Substances: THC   Substance Use Topics    Alcohol use: Not Currently     Comment: Recovering alcoholic    Drug use: Yes     Types: Marijuana, Methamphetamines     Comment: medical- vapes 3 times per wk at hs        Review of Systems   Constitutional: Negative  HENT: Negative  Eyes: Negative  Respiratory: Negative  Cardiovascular: Negative  Gastrointestinal: Negative  Endocrine: Negative  Genitourinary: Negative  Musculoskeletal: Negative  Skin: Negative  Allergic/Immunologic: Negative  Neurological: Negative  Hematological: Negative  Psychiatric/Behavioral: Positive for suicidal ideas  Patient states she wants to take her own life because she does not feel loved and feels lonely  All other systems reviewed and are negative        Physical Exam  ED Triage Vitals   Temperature Pulse Respirations Blood Pressure SpO2   22 1537 22 1501 22 1501 22 1501 22 1501   98 °F (36 7 °C) 72 18 134/89 97 %      Temp Source Heart Rate Source Patient Position - Orthostatic VS BP Location FiO2 (%)   22 1537 22 1501 22 1501 22 1501 --   Oral Monitor Sitting Right arm       Pain Score       22 1501       No Pain Orthostatic Vital Signs  Vitals:    07/13/22 1600 07/13/22 1630 07/13/22 1814 07/13/22 1855   BP: 122/71 122/63 137/63 147/84   Pulse: 58 60 69 75   Patient Position - Orthostatic VS: Lying Lying Lying Lying       Physical Exam  Vitals and nursing note reviewed  Constitutional:       Appearance: She is obese  HENT:      Head: Normocephalic and atraumatic  Nose: Nose normal    Eyes:      Extraocular Movements: Extraocular movements intact  Pupils: Pupils are equal, round, and reactive to light  Cardiovascular:      Rate and Rhythm: Normal rate and regular rhythm  Pulmonary:      Effort: Pulmonary effort is normal       Breath sounds: Normal breath sounds  Abdominal:      General: Abdomen is flat  Palpations: Abdomen is soft  Musculoskeletal:         General: Normal range of motion  Cervical back: Normal range of motion and neck supple  Skin:     General: Skin is warm and dry  Neurological:      General: No focal deficit present  Mental Status: She is alert and oriented to person, place, and time  Psychiatric:      Comments: Patient looks depressed and tearful           ED Medications  Medications - No data to display    Diagnostic Studies  Results Reviewed     Procedure Component Value Units Date/Time    Valproic acid level, total [[id number]]     Lab Status: No result Specimen: Blood     Ammonia [[id number]]     Lab Status: No result Specimen: Blood     Ammonia [[id number]]  (Normal) Collected: 07/13/22 1824    Lab Status: Final result Specimen: Blood from Arm, Right Updated: 07/13/22 1849     Ammonia 43 umol/L     Valproic acid level, total [[id number]]  (Abnormal) Collected: 07/13/22 1824    Lab Status: Final result Specimen: Blood from Arm, Right Updated: 07/13/22 1848     Valproic Acid, Total 130 ug/mL     FLU/RSV/COVID - if FLU/RSV clinically relevant [[id number]]  (Normal) Collected: 07/13/22 1554    Lab Status: Final result Specimen: Nares from Nose Updated: 07/13/22 1636     SARS-CoV-2 Negative     INFLUENZA A PCR Negative     INFLUENZA B PCR Negative     RSV PCR Negative    Narrative:      FOR PEDIATRIC PATIENTS - copy/paste COVID Guidelines URL to browser: [email or web address]  [email or web address]    SARS-CoV-2 assay is a Nucleic Acid Amplification assay intended for the  qualitative detection of nucleic acid from SARS-CoV-2 in nasopharyngeal  swabs  Results are for the presumptive identification of SARS-CoV-2 RNA  Positive results are indicative of infection with SARS-CoV-2, the virus  causing COVID-19, but do not rule out bacterial infection or co-infection  with other viruses  Laboratories within the [place] and its  territories are required to report all positive results to the appropriate  public health authorities  Negative results do not preclude SARS-CoV-2  infection and should not be used as the sole basis for treatment or other  patient management decisions  Negative results must be combined with  clinical observations, patient history, and epidemiological information  This test has not been FDA cleared or approved  This test has been authorized by FDA under an Emergency Use Authorization  (EUA)  This test is only authorized for the duration of time the  declaration that circumstances exist justifying the authorization of the  emergency use of an in vitro diagnostic tests for detection of SARS-CoV-2  virus and/or diagnosis of COVID-19 infection under section 564(b)(1) of  the Act, 21 U  S C  928UPA-5(W)(4), unless the authorization is terminated  or revoked sooner  The test has been validated but independent review by FDA  and CLIA is pending  Test performed using [employer] GeneXpert: This RT-PCR assay targets N2,  a region unique to SARS-CoV-2  A conserved region in the E-gene was chosen  for pan-Sarbecovirus detection which includes SARS-CoV-2      Acetaminophen level-If concentration is detectable, please discuss with medical  on call   [[id number]]  (Abnormal) Collected: 07/13/22 1520    Lab Status: Final result Specimen: Blood from Arm, Left Updated: 07/13/22 1614     Acetaminophen Level <10 ug/mL     Comprehensive metabolic panel [[id number]] Collected: 07/13/22 1520    Lab Status: Final result Specimen: Blood from Arm, Left Updated: 07/13/22 1614     Sodium 139 mmol/L      Potassium 4 2 mmol/L      Chloride 105 mmol/L      CO2 24 mmol/L      ANION GAP 10 mmol/L      BUN 12 mg/dL      Creatinine 0 64 mg/dL      Glucose 90 mg/dL      Calcium 9 0 mg/dL      AST 14 U/L      ALT 20 U/L      Alkaline Phosphatase 65 U/L      Total Protein 6 7 g/dL      Albumin 3 9 g/dL      Total Bilirubin 0 28 mg/dL      eGFR 104 ml/min/1 73sq m     Narrative:      [place] guidelines for Chronic Kidney Disease (CKD):     Stage 1 with normal or high GFR (GFR > 90 mL/min/1 73 square meters)    Stage 2 Mild CKD (GFR = 60-89 mL/min/1 73 square meters)    Stage 3A Moderate CKD (GFR = 45-59 mL/min/1 73 square meters)    Stage 3B Moderate CKD (GFR = 30-44 mL/min/1 73 square meters)    Stage 4 Severe CKD (GFR = 15-29 mL/min/1 73 square meters)    Stage 5 End Stage CKD (GFR <15 mL/min/1 73 square meters)  Note: GFR calculation is accurate only with a steady state creatinine    Salicylate level [[id number]]  (Normal) Collected: 07/13/22 1520    Lab Status: Final result Specimen: Blood from Arm, Left Updated: 08/40/59 4614     Salicylate Lvl <5 mg/dL     Ethanol [[id number]]  (Normal) Collected: 07/13/22 1520    Lab Status: Final result Specimen: Blood from Arm, Left Updated: 07/13/22 1612     Ethanol Lvl <10 mg/dL     Valproic acid level, total [[id number]]  (Normal) Collected: 07/13/22 1520    Lab Status: Final result Specimen: Blood from Arm, Left Updated: 07/13/22 1610     Valproic Acid, Total 64 ug/mL     Rapid drug screen, urine [[id number]]  (Abnormal) Collected: 07/13/22 1520    Lab Status: Final result Specimen: Urine, Clean Catch Updated: 07/13/22 1609     Amph/Meth UR Positive     Barbiturate Ur Negative     Benzodiazepine Urine Negative     Cocaine Urine Negative     Methadone Urine Negative     Opiate Urine Negative     PCP Ur Negative     THC Urine Positive     Oxycodone Urine Negative    Narrative:      Presumptive report  If requested, specimen will be sent to reference lab for confirmation  FOR MEDICAL PURPOSES ONLY  IF CONFIRMATION NEEDED PLEASE CONTACT THE LAB WITHIN 5 DAYS      Drug Screen Cutoff Levels:  AMPHETAMINE/METHAMPHETAMINES  1000 ng/mL  BARBITURATES     200 ng/mL  BENZODIAZEPINES     200 ng/mL  COCAINE      300 ng/mL  METHADONE      300 ng/mL  OPIATES      300 ng/mL  PHENCYCLIDINE     25 ng/mL  THC       50 ng/mL  OXYCODONE      100 ng/mL    CBC and differential [[id number]]  (Abnormal) Collected: 07/13/22 1520    Lab Status: Final result Specimen: Blood from Arm, Left Updated: 07/13/22 1557     WBC 11 56 Thousand/uL      RBC 4 82 Million/uL      Hemoglobin 14 6 g/dL      Hematocrit 45 5 %      MCV 94 fL      MCH 30 3 pg      MCHC 32 1 g/dL      RDW 13 0 %      MPV 11 2 fL      Platelets 290 Thousands/uL      nRBC 0 /100 WBCs      Neutrophils Relative 58 %      Immat GRANS % 1 %      Lymphocytes Relative 28 %      Monocytes Relative 11 %      Eosinophils Relative 2 %      Basophils Relative 0 %      Neutrophils Absolute 6 74 Thousands/µL      Immature Grans Absolute 0 12 Thousand/uL      Lymphocytes Absolute 3 22 Thousands/µL      Monocytes Absolute 1 26 Thousand/µL      Eosinophils Absolute 0 17 Thousand/µL      Basophils Absolute 0 05 Thousands/µL     POCT pregnancy, urine [[id number]]  (Normal) Resulted: 07/13/22 1535    Lab Status: Final result Updated: 07/13/22 1535     EXT PREG TEST UR (Ref: Negative) negative     Control valid    POCT alcohol breath test [[id number]]  (Normal) Resulted: 07/13/22 1535    Lab Status: Final result Updated: 07/13/22 1535     EXTBreath Alcohol 0 000 No orders to display         Procedures  Procedures      ED Course                             SBIRT 22yo+    Flowsheet Row Most Recent Value   SBIRT (23 yo +)    In order to provide better care to our patients, we are screening all of our patients for alcohol and drug use  Would it be okay to ask you these screening questions? Yes Filed at: 07/13/2022 1515   Initial Alcohol Screen: US AUDIT-C     1  How often do you have a drink containing alcohol? 0 Filed at: 07/13/2022 1515   2  How many drinks containing alcohol do you have on a typical day you are drinking? 0 Filed at: 07/13/2022 1515   3b  FEMALE Any Age, or MALE 65+: How often do you have 4 or more drinks on one occassion? 0 Filed at: 07/13/2022 1515   Audit-C Score 0 Filed at: 07/13/2022 1515   OLEG: How many times in the past year have you    Used an illegal drug or used a prescription medication for non-medical reasons? Daily or Almost Daily Filed at: 07/13/2022 1515   DAST-10: In the past 12 months       1  Have you used drugs other than those required for medical reasons? 1 Filed at: 07/13/2022 1515   2  Do you use more than one drug at a time? 1 Filed at: 07/13/2022 1515   3  Have you had medical problems as a result of your drug use (e g , memory loss, hepatitis, convulsions, bleeding, etc )? 0 Filed at: 07/13/2022 1515   4  Have you had "blackouts" or "flashbacks" as a result of drug use? YesNo 0 Filed at: 07/13/2022 1515   5  Do you ever feel bad or guilty about your drug use? 1 Filed at: 07/13/2022 1515   6  Does your spouse (or parent) ever complain about your involvement with drugs? 0 Filed at: 07/13/2022 1515   7  Have you neglected your family because of your use of drugs? 0 Filed at: 07/13/2022 1515   8  Have you engaged in illegal activities in order to obtain drugs? 0 Filed at: 07/13/2022 1515   9  Have you ever experienced withdrawal symptoms (felt sick) when you stopped taking drugs? 0 Filed at: 07/13/2022 1515   10   Are you always able to stop using drugs when you want to? 1 Filed at: 07/13/2022 1515   DAST-10 Score 4 Filed at: 07/13/2022 1515                [facility]  Number of Diagnoses or Management Options  Intentional drug overdose, initial encounter [facility])  Diagnosis management comments: A 60-year-old female with past medical history of suicidal attempt presented after she took 14 tablets of Depakote and 2-3 lines of methamphetamine  Patient took  7000 mg of Depakote  Toxicology recommended the patient be on observation for 24 hours with serial EKG and repeat Depakote level Q 2 hours  Patient should be put on seizure precaution for 24 hours  Patient has been admitted on the service of [facility] for inpatient observation and monitoring  Disposition  Final diagnoses:   Intentional drug overdose, initial encounter [facility])     Time reflects when diagnosis was documented in both MDM as applicable and the Disposition within this note     Time User Action Codes Description Comment    7/13/2022  5:09 PM Adam Yost [T50 902A] Intentional drug overdose, initial encounter [facility])     7/13/2022  6:19 PM Deandre Yost Tamia Gift Attempted suicide ([facility] [address] )     7/13/2022  6:19 PM Telma Ojeda Add [F39] Mood disorder ([facility] [address] )     7/13/2022  6:19 PM Deandre Good Add [J98 4] Restrictive lung disease     7/13/2022  6:20 PM Gurpreet Ojeda Meter [J98 4] Restrictive lung disease       ED Disposition     ED Disposition   Admit    Condition   Stable    Date/Time   Wed Jul 13, 2022  6:04 PM    Comment   Case was discussed with Hayden and the patient's admission status was agreed to be Admission Status: observation status to the service of [place] [facility]   Follow-up Information    None         Patient's Medications   Discharge Prescriptions    No medications on file     No discharge procedures on file      PDMP Review       Value Time User    PDMP Reviewed  Yes 1/10/2022  8:33 AM Bishop Rivers ERICKSON           ED Provider  Attending physically available and evaluated Cj Younger I managed the patient along with the ED Attending      Electronically Signed by         Angelita Smith MD  07/13/22 9441

## 2022-07-13 NOTE — ED NOTES
Call to [phone] [place] ([phone])  Notified Luis Vicente, ACT Team B, that patient arrived to the ED with reported intentional overdose  She will alert the team and have someone follow-up  She understands the patient will need to be medically evaluated and cleared, but that if there was an attempt, psychiatric admission is likely and a diversion with safety plan is not likely

## 2022-07-13 NOTE — ASSESSMENT & PLAN NOTE
Patient noted taking 14 pills of dapekote at 14:00 on [id number] as well as medical marajuana and methylphenidate (purchased on street) subsequently  called EMS and was brought to ED  Dr Jenell Mortimer psychiatrist Tobi Singletary prescribes the dapekote for schizoaffective disorder bipolar unknown type  Patient has not eat today  Patient is tired, had chest pain which has subsided  Patient endorses H/A is tearful and initally was noncommunicative  SOCIAL: 5 years since last 2 packs70 pack year  No alcohol  Hector Ports couple hits each night  Bathsalts was sliped the salts took unintentially  Meth amphetamines snort 2-3 most days from street  PLAN:  Toxicology Recommendation:  1  Valproic Acid Level: Trend q2-3hrs until level peaks and they downtrend x 2: Last Lab 130 (elevated) at 16:34  2  Ammonia Level: Lab ordered 18:56 [id number]  3   Seizure precautions for 24hrs since ingestion (Pt reports ingestion at 14:00 [id number]    4  IF patient becomes tachycardia Serial EKG monitoring for QRS widening: IF QRS wide treat as follows  -QRS>120: give 1-2 mEq/kg NaBicarb bolus & Titrate until pH<7 55 K>4 0  GOAL ATc<120    One on one observation  Psych Consult  IVF  ABG

## 2022-07-13 NOTE — ED ATTENDING ATTESTATION
7/13/2022  IPreston MD, saw and evaluated the patient  I have discussed the patient with the resident/non-physician practitioner and agree with the resident's/non-physician practitioner's findings, Plan of Care, and MDM as documented in the resident's/non-physician practitioner's note, except where noted  All available labs and Radiology studies were reviewed  I was present for key portions of any procedure(s) performed by the resident/non-physician practitioner and I was immediately available to provide assistance  At this point I agree with the current assessment done in the Emergency Department  I have conducted an independent evaluation of this patient a history and physical is as follows:  Patient reports intentional overdose at about 1400 of 14 500 mg tablets of Depakote in 1 sitting  She states that she has been depressed multiple family issues  She reports ongoing suicidal ideation  She reports she took this medication, began to think of her cat called EMS  Patient denies any acute medical issues at this point  States he took her normal medications this morning, also took medical marijuana and methamphetamines this afternoon  Review of Systems - Negative except depression  Physical Exam  Vitals and nursing note reviewed  Constitutional:       General: She is not in acute distress  Appearance: She is well-developed  Comments: Ambulates independently   HENT:      Head: Normocephalic and atraumatic  Eyes:      Pupils: Pupils are equal, round, and reactive to light  Cardiovascular:      Rate and Rhythm: Normal rate and regular rhythm  Heart sounds: Normal heart sounds  No murmur heard  Pulmonary:      Effort: Pulmonary effort is normal  No respiratory distress  Breath sounds: Normal breath sounds  No wheezing or rales  Abdominal:      General: Bowel sounds are normal  There is no distension  Palpations: Abdomen is soft  Tenderness:  There is no abdominal tenderness  There is no guarding or rebound  Musculoskeletal:         General: No deformity  Normal range of motion  Cervical back: Normal range of motion and neck supple  Lymphadenopathy:      Cervical: No cervical adenopathy  Skin:     Capillary Refill: Capillary refill takes less than 2 seconds  Findings: No erythema or rash  Neurological:      Mental Status: She is alert and oriented to person, place, and time  Cranial Nerves: No cranial nerve deficit  Motor: No abnormal muscle tone  Coordination: Coordination normal       Comments: No clonus  No rigidity  Normal deep tendon reflexes   Psychiatric:      Comments: Tearful, poor eye contact         Results Reviewed     Procedure Component Value Units Date/Time    Valproic acid level, total [[id number]]     Lab Status: No result Specimen: Blood     Ammonia [[id number]]     Lab Status: No result Specimen: Blood     FLU/RSV/COVID - if FLU/RSV clinically relevant [[id number]]  (Normal) Collected: 07/13/22 1554    Lab Status: Final result Specimen: Nares from Nose Updated: 07/13/22 1636     SARS-CoV-2 Negative     INFLUENZA A PCR Negative     INFLUENZA B PCR Negative     RSV PCR Negative    Narrative:      FOR PEDIATRIC PATIENTS - copy/paste COVID Guidelines URL to browser: [email or web address]    SARS-CoV-2 assay is a Nucleic Acid Amplification assay intended for the  qualitative detection of nucleic acid from SARS-CoV-2 in nasopharyngeal  swabs  Results are for the presumptive identification of SARS-CoV-2 RNA  Positive results are indicative of infection with SARS-CoV-2, the virus  causing COVID-19, but do not rule out bacterial infection or co-infection  with other viruses  Laboratories within the [place] and its  territories are required to report all positive results to the appropriate  public health authorities   Negative results do not preclude SARS-CoV-2  infection and should not be used as the sole basis for treatment or other  patient management decisions  Negative results must be combined with  clinical observations, patient history, and epidemiological information  This test has not been FDA cleared or approved  This test has been authorized by FDA under an Emergency Use Authorization  (EUA)  This test is only authorized for the duration of time the  declaration that circumstances exist justifying the authorization of the  emergency use of an in vitro diagnostic tests for detection of SARS-CoV-2  virus and/or diagnosis of COVID-19 infection under section 564(b)(1) of  the Act, 21 U  S C  097VOD-2(R)(4), unless the authorization is terminated  or revoked sooner  The test has been validated but independent review by FDA  and CLIA is pending  Test performed using [employer] GeneXpert: This RT-PCR assay targets N2,  a region unique to SARS-CoV-2  A conserved region in the E-gene was chosen  for pan-Sarbecovirus detection which includes SARS-CoV-2  Acetaminophen level-If concentration is detectable, please discuss with medical  on call   [[id number]]  (Abnormal) Collected: 07/13/22 1520    Lab Status: Final result Specimen: Blood from Arm, Left Updated: 07/13/22 1614     Acetaminophen Level <10 ug/mL     Comprehensive metabolic panel [[id number]] Collected: 07/13/22 1520    Lab Status: Final result Specimen: Blood from Arm, Left Updated: 07/13/22 1614     Sodium 139 mmol/L      Potassium 4 2 mmol/L      Chloride 105 mmol/L      CO2 24 mmol/L      ANION GAP 10 mmol/L      BUN 12 mg/dL      Creatinine 0 64 mg/dL      Glucose 90 mg/dL      Calcium 9 0 mg/dL      AST 14 U/L      ALT 20 U/L      Alkaline Phosphatase 65 U/L      Total Protein 6 7 g/dL      Albumin 3 9 g/dL      Total Bilirubin 0 28 mg/dL      eGFR 104 ml/min/1 73sq m     Narrative:      [place] guidelines for Chronic Kidney Disease (CKD):     Stage 1 with normal or high GFR (GFR > 90 mL/min/1 73 square meters)    Stage 2 Mild CKD (GFR = 60-89 mL/min/1 73 square meters)    Stage 3A Moderate CKD (GFR = 45-59 mL/min/1 73 square meters)    Stage 3B Moderate CKD (GFR = 30-44 mL/min/1 73 square meters)    Stage 4 Severe CKD (GFR = 15-29 mL/min/1 73 square meters)    Stage 5 End Stage CKD (GFR <15 mL/min/1 73 square meters)  Note: GFR calculation is accurate only with a steady state creatinine    Salicylate level [[id number]]  (Normal) Collected: 07/13/22 1520    Lab Status: Final result Specimen: Blood from Arm, Left Updated: 34/33/94 1381     Salicylate Lvl <5 mg/dL     Ethanol [[id number]]  (Normal) Collected: 07/13/22 1520    Lab Status: Final result Specimen: Blood from Arm, Left Updated: 07/13/22 1612     Ethanol Lvl <10 mg/dL     Valproic acid level, total [[id number]]  (Normal) Collected: 07/13/22 1520    Lab Status: Final result Specimen: Blood from Arm, Left Updated: 07/13/22 1610     Valproic Acid, Total 64 ug/mL     Rapid drug screen, urine [[id number]]  (Abnormal) Collected: 07/13/22 1520    Lab Status: Final result Specimen: Urine, Clean Catch Updated: 07/13/22 1609     Amph/Meth UR Positive     Barbiturate Ur Negative     Benzodiazepine Urine Negative     Cocaine Urine Negative     Methadone Urine Negative     Opiate Urine Negative     PCP Ur Negative     THC Urine Positive     Oxycodone Urine Negative    Narrative:      Presumptive report  If requested, specimen will be sent to reference lab for confirmation  FOR MEDICAL PURPOSES ONLY  IF CONFIRMATION NEEDED PLEASE CONTACT THE LAB WITHIN 5 DAYS      Drug Screen Cutoff Levels:  AMPHETAMINE/METHAMPHETAMINES  1000 ng/mL  BARBITURATES     200 ng/mL  BENZODIAZEPINES     200 ng/mL  COCAINE      300 ng/mL  METHADONE      300 ng/mL  OPIATES      300 ng/mL  PHENCYCLIDINE     25 ng/mL  THC       50 ng/mL  OXYCODONE      100 ng/mL    CBC and differential [[id number]]  (Abnormal) Collected: 07/13/22 1520    Lab Status: Final result Specimen: Blood from Arm, Left Updated: 07/13/22 1557     WBC 11 56 Thousand/uL      RBC 4 82 Million/uL      Hemoglobin 14 6 g/dL      Hematocrit 45 5 %      MCV 94 fL      MCH 30 3 pg      MCHC 32 1 g/dL      RDW 13 0 %      MPV 11 2 fL      Platelets 195 Thousands/uL      nRBC 0 /100 WBCs      Neutrophils Relative 58 %      Immat GRANS % 1 %      Lymphocytes Relative 28 %      Monocytes Relative 11 %      Eosinophils Relative 2 %      Basophils Relative 0 %      Neutrophils Absolute 6 74 Thousands/µL      Immature Grans Absolute 0 12 Thousand/uL      Lymphocytes Absolute 3 22 Thousands/µL      Monocytes Absolute 1 26 Thousand/µL      Eosinophils Absolute 0 17 Thousand/µL      Basophils Absolute 0 05 Thousands/µL     POCT pregnancy, urine [[id number]]  (Normal) Resulted: 07/13/22 1535    Lab Status: Final result Updated: 07/13/22 1535     EXT PREG TEST UR (Ref: Negative) negative     Control valid    POCT alcohol breath test [[id number]]  (Normal) Resulted: 07/13/22 1535    Lab Status: Final result Updated: 07/13/22 1535     EXTBreath Alcohol 0 000          Appreciate toxicology recommendations for admission, 24 hours of monitoring, seizure precautions, recheck Depakote level, serial EKGs      Patient hemodynamically stable, tearful, admitted in stable condition, seen in the emergency department by admitting team     ED Course         Critical Care Time  Procedures

## 2022-07-14 ENCOUNTER — RA CDI HCC (OUTPATIENT)
Dept: OTHER | Facility: HOSPITAL | Age: 52
End: 2022-07-14

## 2022-07-14 LAB
ALBUMIN SERPL BCP-MCNC: 3.5 G/DL (ref 3.5–5)
ALBUMIN SERPL BCP-MCNC: 3.6 G/DL (ref 3.5–5)
ALP SERPL-CCNC: 55 U/L (ref 34–104)
ALP SERPL-CCNC: 58 U/L (ref 34–104)
ALT SERPL W P-5'-P-CCNC: 17 U/L (ref 7–52)
ALT SERPL W P-5'-P-CCNC: 18 U/L (ref 7–52)
ANION GAP SERPL CALCULATED.3IONS-SCNC: 8 MMOL/L (ref 4–13)
ANION GAP SERPL CALCULATED.3IONS-SCNC: 8 MMOL/L (ref 4–13)
AST SERPL W P-5'-P-CCNC: 13 U/L (ref 13–39)
AST SERPL W P-5'-P-CCNC: 13 U/L (ref 13–39)
ATRIAL RATE: 61 BPM
BILIRUB SERPL-MCNC: 0.27 MG/DL (ref 0.2–1)
BILIRUB SERPL-MCNC: 0.3 MG/DL (ref 0.2–1)
BUN SERPL-MCNC: 10 MG/DL (ref 5–25)
BUN SERPL-MCNC: 9 MG/DL (ref 5–25)
CALCIUM SERPL-MCNC: 8.4 MG/DL (ref 8.4–10.2)
CALCIUM SERPL-MCNC: 8.5 MG/DL (ref 8.4–10.2)
CHLORIDE SERPL-SCNC: 109 MMOL/L (ref 96–108)
CHLORIDE SERPL-SCNC: 109 MMOL/L (ref 96–108)
CO2 SERPL-SCNC: 23 MMOL/L (ref 21–32)
CO2 SERPL-SCNC: 24 MMOL/L (ref 21–32)
CREAT SERPL-MCNC: 0.61 MG/DL (ref 0.6–1.3)
CREAT SERPL-MCNC: 0.65 MG/DL (ref 0.6–1.3)
ERYTHROCYTE [DISTWIDTH] IN BLOOD BY AUTOMATED COUNT: 13 % (ref 11.6–15.1)
GFR SERPL CREATININE-BSD FRML MDRD: 103 ML/MIN/1.73SQ M
GFR SERPL CREATININE-BSD FRML MDRD: 106 ML/MIN/1.73SQ M
GLUCOSE P FAST SERPL-MCNC: 86 MG/DL (ref 65–99)
GLUCOSE SERPL-MCNC: 82 MG/DL (ref 65–140)
GLUCOSE SERPL-MCNC: 86 MG/DL (ref 65–140)
GLUCOSE SERPL-MCNC: 93 MG/DL (ref 65–140)
HCT VFR BLD AUTO: 44.4 % (ref 34.8–46.1)
HGB BLD-MCNC: 14.2 G/DL (ref 11.5–15.4)
MCH RBC QN AUTO: 30.1 PG (ref 26.8–34.3)
MCHC RBC AUTO-ENTMCNC: 32 G/DL (ref 31.4–37.4)
MCV RBC AUTO: 94 FL (ref 82–98)
P AXIS: 51 DEGREES
PLATELET # BLD AUTO: 193 THOUSANDS/UL (ref 149–390)
PMV BLD AUTO: 10.7 FL (ref 8.9–12.7)
POTASSIUM SERPL-SCNC: 3.8 MMOL/L (ref 3.5–5.3)
POTASSIUM SERPL-SCNC: 3.8 MMOL/L (ref 3.5–5.3)
PR INTERVAL: 202 MS
PROT SERPL-MCNC: 6.2 G/DL (ref 6.4–8.4)
PROT SERPL-MCNC: 6.4 G/DL (ref 6.4–8.4)
QRS AXIS: -11 DEGREES
QRSD INTERVAL: 84 MS
QT INTERVAL: 398 MS
QTC INTERVAL: 400 MS
RBC # BLD AUTO: 4.71 MILLION/UL (ref 3.81–5.12)
SODIUM SERPL-SCNC: 140 MMOL/L (ref 135–147)
SODIUM SERPL-SCNC: 141 MMOL/L (ref 135–147)
T WAVE AXIS: 64 DEGREES
VALPROATE SERPL-MCNC: 139 UG/ML (ref 50–100)
VALPROATE SERPL-MCNC: 142 UG/ML (ref 50–100)
VENTRICULAR RATE: 61 BPM
WBC # BLD AUTO: 9.32 THOUSAND/UL (ref 4.31–10.16)

## 2022-07-14 PROCEDURE — 80053 COMPREHEN METABOLIC PANEL: CPT

## 2022-07-14 PROCEDURE — 82948 REAGENT STRIP/BLOOD GLUCOSE: CPT

## 2022-07-14 PROCEDURE — 99221 1ST HOSP IP/OBS SF/LOW 40: CPT | Performed by: PSYCHIATRY & NEUROLOGY

## 2022-07-14 PROCEDURE — 80164 ASSAY DIPROPYLACETIC ACD TOT: CPT

## 2022-07-14 PROCEDURE — 99232 SBSQ HOSP IP/OBS MODERATE 35: CPT | Performed by: INTERNAL MEDICINE

## 2022-07-14 PROCEDURE — 93005 ELECTROCARDIOGRAM TRACING: CPT

## 2022-07-14 PROCEDURE — 93010 ELECTROCARDIOGRAM REPORT: CPT | Performed by: INTERNAL MEDICINE

## 2022-07-14 PROCEDURE — 85027 COMPLETE CBC AUTOMATED: CPT

## 2022-07-14 RX ORDER — ASPIRIN 81 MG/1
81 TABLET ORAL DAILY
COMMUNITY
End: 2022-09-15

## 2022-07-14 RX ORDER — LOPERAMIDE HCL 1 MG/7.5ML
2 SUSPENSION ORAL 3 TIMES DAILY PRN
Status: DISCONTINUED | OUTPATIENT
Start: 2022-07-14 | End: 2022-07-15 | Stop reason: HOSPADM

## 2022-07-14 RX ORDER — HALOPERIDOL 5 MG/1
5 TABLET ORAL ONCE
Status: CANCELLED | OUTPATIENT
Start: 2022-07-14

## 2022-07-14 RX ORDER — HYDROXYZINE HYDROCHLORIDE 25 MG/1
25 TABLET, FILM COATED ORAL EVERY 6 HOURS PRN
COMMUNITY
End: 2022-07-26

## 2022-07-14 RX ORDER — SENNA AND DOCUSATE SODIUM 50; 8.6 MG/1; MG/1
2 TABLET, FILM COATED ORAL DAILY
COMMUNITY
End: 2022-08-10 | Stop reason: ALTCHOICE

## 2022-07-14 RX ADMIN — ONDANSETRON 4 MG: 2 INJECTION INTRAMUSCULAR; INTRAVENOUS at 10:53

## 2022-07-14 RX ADMIN — PANTOPRAZOLE SODIUM 40 MG: 40 TABLET, DELAYED RELEASE ORAL at 06:27

## 2022-07-14 RX ADMIN — ENOXAPARIN SODIUM 40 MG: 40 INJECTION SUBCUTANEOUS at 10:24

## 2022-07-14 RX ADMIN — ESTRADIOL 0.5 MG: 1 TABLET ORAL at 10:26

## 2022-07-14 RX ADMIN — LOPERAMIDE HCL 2 MG: 1 SOLUTION ORAL at 21:22

## 2022-07-14 RX ADMIN — PILOCARPINE HYDROCHLORIDE 5 MG: 5 TABLET, FILM COATED ORAL at 10:29

## 2022-07-14 RX ADMIN — TOPIRAMATE 100 MG: 100 TABLET, FILM COATED ORAL at 10:22

## 2022-07-14 RX ADMIN — OXYBUTYNIN 5 MG: 5 TABLET, FILM COATED, EXTENDED RELEASE ORAL at 10:38

## 2022-07-14 RX ADMIN — AMLODIPINE BESYLATE 5 MG: 5 TABLET ORAL at 10:22

## 2022-07-14 RX ADMIN — ONDANSETRON 4 MG: 2 INJECTION INTRAMUSCULAR; INTRAVENOUS at 00:36

## 2022-07-14 RX ADMIN — LORATADINE 10 MG: 10 TABLET ORAL at 10:21

## 2022-07-14 RX ADMIN — Medication 5000 UNITS: at 10:22

## 2022-07-14 RX ADMIN — SODIUM CHLORIDE 75 ML/HR: 0.9 INJECTION, SOLUTION INTRAVENOUS at 11:05

## 2022-07-14 RX ADMIN — FUROSEMIDE 20 MG: 20 TABLET ORAL at 10:22

## 2022-07-14 RX ADMIN — LOSARTAN POTASSIUM 50 MG: 50 TABLET, FILM COATED ORAL at 10:22

## 2022-07-14 RX ADMIN — TOPIRAMATE 100 MG: 100 TABLET, FILM COATED ORAL at 16:51

## 2022-07-14 RX ADMIN — PILOCARPINE HYDROCHLORIDE 5 MG: 5 TABLET, FILM COATED ORAL at 21:28

## 2022-07-14 RX ADMIN — PANTOPRAZOLE SODIUM 40 MG: 40 TABLET, DELAYED RELEASE ORAL at 16:47

## 2022-07-14 RX ADMIN — LEVOTHYROXINE SODIUM 100 MCG: 100 TABLET ORAL at 06:27

## 2022-07-14 RX ADMIN — ROPINIROLE 0.5 MG: 0.25 TABLET, FILM COATED ORAL at 21:23

## 2022-07-14 RX ADMIN — LURASIDONE HYDROCHLORIDE 60 MG: 20 TABLET, FILM COATED ORAL at 21:27

## 2022-07-14 RX ADMIN — METOPROLOL TARTRATE 25 MG: 25 TABLET, FILM COATED ORAL at 10:21

## 2022-07-14 RX ADMIN — METOPROLOL TARTRATE 25 MG: 25 TABLET, FILM COATED ORAL at 21:23

## 2022-07-14 RX ADMIN — PILOCARPINE HYDROCHLORIDE 5 MG: 5 TABLET, FILM COATED ORAL at 16:50

## 2022-07-14 NOTE — ASSESSMENT & PLAN NOTE
Patient noted taking 14 pills of dapekote at 14:00 on 13JUL2022 as well as medical marajuana and methylphenidate (purchased on street) subsequently  called EMS and was brought to ED  Dr Khanh Catalan psychiatrist [facility] prescribes the dapekote for schizoaffective disorder bipolar unknown type  Patient has not eat today  Patient is tired, had chest pain which has subsided  Patient endorses H/A is tearful and initally was noncommunicative  SOCIAL: 5 years since last 2 packs70 pack year  No alcohol  [place] couple hits each night  Bathsalts was sliped the salts took unintentially  Meth amphetamines snort 2-3 most days from street  PLAN: Stabilize patient medically and form toxicology viewpoint  IF ANY QUESTIONS THAT CANT BE ANSWERED [facility] [phone]  Toxicology Recommendation:  1  Valproic Acid Level: No longer need to trend unless patient becomes symptomatic: CNS depression  However check valproic acid levels prior to restarting if deemed appropriate by psych  2  Seizure precautions for 24hrs since ingestion (Pt reports ingestion at 14:00 [id number]) as patient is on bupropion which lowers seizure threshold even at therapeutic doses  3  IF patient becomes tachycardia Serial EKG monitoring for QRS widening: IF QRS wide treat as follows  -QRS>120: give 1-2 mEq/kg NaBicarb bolus & Titrate until pH<7 55 K>4 0  GOAL ATc<120  4  Hold Venlafaxine, bupropion, citalopram, escitalopram    5  Hold Valproic Acid  6  VA known to cause encephalopathy and hepatotoxicity:   -if patient develops encephalopathy  Repeat ammonia level  -If patient develops toxicity treat w/ L-carntitine IV 100mg/Kg followed by 50mg/kg Q6  One on one observation including monitoring for CNS depression     Psych Consult  IVF  ABG Pending as of [id number]    GOALS: No laboratory or symptomatic evidence of drug OD  GOAL MEASUREMENTS:  Free of tachycardia 48hrs post OD  GOAL ATc<120   No CNS Depression sxs  No sxs of hepatotoxicity

## 2022-07-14 NOTE — PROGRESS NOTES
Pt is irritable and sobbing, removed IV and telemetry monitoring, RN notified MD  [facility], damion HORN currently at bedside

## 2022-07-14 NOTE — ASSESSMENT & PLAN NOTE
Present on admission  This condition is a risk factor for SI, self harm, and attention seeking behavior  [address]: Patient was placed on tele however when informed of inpatient psych plan they became very distraught, removed IV and Tele, was throwing objects, and crying  [address]: Patient mood has fluctuated throughout the inpatient course from tearful/remorseful to good mood to inconsolable which is consistent with the pathology  PLAN  Psych Consult got 201 for inpatient psych treatment  Patient is medically cleared for D/C to inpatient psych treatment

## 2022-07-14 NOTE — PLAN OF CARE
Problem: Potential for Falls  Goal: Patient will remain free of falls  Description: INTERVENTIONS:  - Educate patient/family on patient safety including physical limitations  - Instruct patient to call for assistance with activity   - Consult OT/PT to assist with strengthening/mobility   - Keep Call bell within reach  - Keep bed low and locked with side rails adjusted as appropriate  - Keep care items and personal belongings within reach  - Initiate and maintain comfort rounds  - Make Fall Risk Sign visible to staff  - Offer Toileting every 2 Hours, in advance of need  - Initiate/Maintain bed alarm  - Obtain necessary fall risk management equipment  - Apply yellow socks and bracelet for high fall risk patients  - Consider moving patient to room near nurses station  Outcome: Progressing     Problem: Nutrition/Hydration-ADULT  Goal: Nutrient/Hydration intake appropriate for improving, restoring or maintaining nutritional needs  Description: Monitor and assess patient's nutrition/hydration status for malnutrition  Collaborate with interdisciplinary team and initiate plan and interventions as ordered  Monitor patient's weight and dietary intake as ordered or per policy  Utilize nutrition screening tool and intervene as necessary  Determine patient's food preferences and provide high-protein, high-caloric foods as appropriate       INTERVENTIONS:  - Monitor oral intake, urinary output, labs, and treatment plans  - Assess nutrition and hydration status and recommend course of action  - Evaluate amount of meals eaten  - Assist patient with eating if necessary   - Allow adequate time for meals  - Recommend/ encourage appropriate diets, oral nutritional supplements, and vitamin/mineral supplements  - Order, calculate, and assess calorie counts as needed  - Recommend, monitor, and adjust tube feedings and TPN/PPN based on assessed needs  - Assess need for intravenous fluids  - Provide specific nutrition/hydration education as appropriate  - Include patient/family/caregiver in decisions related to nutrition  Outcome: Progressing     Problem: SAFETY ADULT  Goal: Patient will remain free of falls  Description: INTERVENTIONS:  - Educate patient/family on patient safety including physical limitations  - Instruct patient to call for assistance with activity   - Consult OT/PT to assist with strengthening/mobility   - Keep Call bell within reach  - Keep bed low and locked with side rails adjusted as appropriate  - Keep care items and personal belongings within reach  - Initiate and maintain comfort rounds  - Make Fall Risk Sign visible to staff  - Offer Toileting every 2 Hours, in advance of need  - Initiate/Maintain bed alarm  - Obtain necessary fall risk management equipment  - Apply yellow socks and bracelet for high fall risk patients  - Consider moving patient to room near nurses station  Outcome: Progressing  Goal: Maintain or return to baseline ADL function  Description: INTERVENTIONS:  -  Assess patient's ability to carry out ADLs; assess patient's baseline for ADL function and identify physical deficits which impact ability to perform ADLs (bathing, care of mouth/teeth, toileting, grooming, dressing, etc )  - Assess/evaluate cause of self-care deficits   - Assess range of motion  - Assess patient's mobility; develop plan if impaired  - Assess patient's need for assistive devices and provide as appropriate  - Encourage maximum independence but intervene and supervise when necessary  - Involve family in performance of ADLs  - Assess for home care needs following discharge   - Consider OT consult to assist with ADL evaluation and planning for discharge  - Provide patient education as appropriate  Outcome: Progressing  Goal: Maintains/Returns to pre admission functional level  Description: INTERVENTIONS:  - Perform BMAT or MOVE assessment daily    - Set and communicate daily mobility goal to care team and patient/family/caregiver     - Collaborate with rehabilitation services on mobility goals if consulted  - Perform Range of Motion 2 times a day  - Reposition patient every 2 hours  - Dangle patient 2 times a day  - Stand patient 2 times a day  - Ambulate patient 2 times a day  - Out of bed to chair 2 times a day   - Out of bed for meals 2 times a day  - Out of bed for toileting  - Record patient progress and toleration of activity level   Outcome: Progressing     Problem: NEUROSENSORY - ADULT  Goal: Achieves stable or improved neurological status  Description: INTERVENTIONS  - Monitor and report changes in neurological status  - Monitor vital signs such as temperature, blood pressure, glucose, and any other labs ordered   - Initiate measures to prevent increased intracranial pressure  - Monitor for seizure activity and implement precautions if appropriate      Outcome: Progressing  Goal: Remains free of injury related to seizures activity  Description: INTERVENTIONS  - Maintain airway, patient safety  and administer oxygen as ordered  - Monitor patient for seizure activity, document and report duration and description of seizure to physician/advanced practitioner  - If seizure occurs,  ensure patient safety during seizure  - Reorient patient post seizure  - Seizure pads on all 4 side rails  - Instruct patient/family to notify RN of any seizure activity including if an aura is experienced  - Instruct patient/family to call for assistance with activity based on nursing assessment  - Administer anti-seizure medications if ordered    Outcome: Progressing  Goal: Achieves maximal functionality and self care  Description: INTERVENTIONS  - Monitor swallowing and airway patency with patient fatigue and changes in neurological status  - Encourage and assist patient to increase activity and self care     - Encourage visually impaired, hearing impaired and aphasic patients to use assistive/communication devices  Outcome: Progressing     Problem: METABOLIC, FLUID AND ELECTROLYTES - ADULT  Goal: Electrolytes maintained within normal limits  Description: INTERVENTIONS:  - Monitor labs and assess patient for signs and symptoms of electrolyte imbalances  - Administer electrolyte replacement as ordered  - Monitor response to electrolyte replacements, including repeat lab results as appropriate  - Instruct patient on fluid and nutrition as appropriate  Outcome: Progressing  Goal: Fluid balance maintained  Description: INTERVENTIONS:  - Monitor labs   - Monitor I/O and WT  - Instruct patient on fluid and nutrition as appropriate  - Assess for signs & symptoms of volume excess or deficit  Outcome: Progressing  Goal: Glucose maintained within target range  Description: INTERVENTIONS:  - Monitor Blood Glucose as ordered  - Assess for signs and symptoms of hyperglycemia and hypoglycemia  - Administer ordered medications to maintain glucose within target range  - Assess nutritional intake and initiate nutrition service referral as needed  Outcome: Progressing

## 2022-07-14 NOTE — ASSESSMENT & PLAN NOTE
Patient noted needing a CPAP if placed inpatient  PLAN  CPAP has been made available bedside to patient

## 2022-07-14 NOTE — QUICK NOTE
ERIKA has been following Lowell Le a 48 yo female who was brought to [facility] due to Valproic Acid Over dose  Psych consult was ordered who suggest inpatient psychiatric treatment  Patient is medically stable to be transferred to in patient psych per the suggestions of the toxicology team       We would like to thank the entire treatment team for their help supporting the patients care

## 2022-07-14 NOTE — QUICK NOTE
Wilfredo Crabtree [phone], who is listed as the contact and friend in epic, was contacted by phone at the number above at 16:20 on 14JUL2022 per the patients request   The call was to provide a status update  Call went to voice mail as no name was provide no message was left  Will try again tomorrow in the morning

## 2022-07-14 NOTE — PROGRESS NOTES
Patient is uncooperative, appearing upset and frustrated  She is weeping and aggressively kicking end of bed  She then forcefully pushed over her bedside table and stood with her face in corner of room, not following any commands  Patient is not communicating, but walked back to the bed after about 10 minutes of standing in the corner, crying is more controlled  Phyllis Covert is placed in room and encouraged to eat and drink when feels better  MD notified  MD also notified of patients fluctuating heart rate of 40s-50s  Occasionally dropping to 37 on telemetry monitor

## 2022-07-14 NOTE — PLAN OF CARE
Problem: Potential for Falls  Goal: Patient will remain free of falls  Description: INTERVENTIONS:  - Educate patient/family on patient safety including physical limitations  - Instruct patient to call for assistance with activity   - Consult OT/PT to assist with strengthening/mobility   - Keep Call bell within reach  - Keep bed low and locked with side rails adjusted as appropriate  - Keep care items and personal belongings within reach  - Initiate and maintain comfort rounds  - Make Fall Risk Sign visible to staff  - Offer Toileting every  Hours, in advance of need  - Initiate/Maintain alarm  - Obtain necessary fall risk management equipment  - Apply yellow socks and bracelet for high fall risk patients  - Consider moving patient to room near nurses station  Outcome: Progressing     Problem: Nutrition/Hydration-ADULT  Goal: Nutrient/Hydration intake appropriate for improving, restoring or maintaining nutritional needs  Description: Monitor and assess patient's nutrition/hydration status for malnutrition  Collaborate with interdisciplinary team and initiate plan and interventions as ordered  Monitor patient's weight and dietary intake as ordered or per policy  Utilize nutrition screening tool and intervene as necessary  Determine patient's food preferences and provide high-protein, high-caloric foods as appropriate       INTERVENTIONS:  - Monitor oral intake, urinary output, labs, and treatment plans  - Assess nutrition and hydration status and recommend course of action  - Evaluate amount of meals eaten  - Assist patient with eating if necessary   - Allow adequate time for meals  - Recommend/ encourage appropriate diets, oral nutritional supplements, and vitamin/mineral supplements  - Order, calculate, and assess calorie counts as needed  - Recommend, monitor, and adjust tube feedings and TPN/PPN based on assessed needs  - Assess need for intravenous fluids  - Provide specific nutrition/hydration education as appropriate  - Include patient/family/caregiver in decisions related to nutrition  Outcome: Progressing

## 2022-07-14 NOTE — ASSESSMENT & PLAN NOTE
Present on admission  PLAN  Hold bupropion, venlafaxine, citalopram escitalopram  Consider using alternatives outpatient and these drugs have a risk of abuse  Psych Consult would be appreciated  Please reconcile home medication as list is extensive and patient admits doubts as the medications are prepared for them

## 2022-07-14 NOTE — CONSULTS
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 on assessment at the time of the interview   Demographic risk factors include:    Historical Risk Factors include: chronic psychiatric problems, chronic depression, chronic anxiety symptoms, chronic mood disorder, history of serious suicide attempts, history of self-abusive behavior, history of substance use   Recent Specific Risk Factors include: current depressive symptoms, unstable mood, persistent suicidal ideation, recent serious suicide attempt, substance abuse, feelings of guilt or self blame, health problems   Protective Factors: able to manage anger well, having pets, supportive family   Weapons: none  The following steps have been taken to ensure weapons are properly secured: not applicable   Based on today's assessment, Rafia Car presents the following risk of harm to self: moderate    Risk of Harm to Others:   The following ratings are based on assessment at the time of the interview   Demographic Risk Factors include: none   Historical Risk Factors include: drug abuse   Recent Specific Risk Factors include: multiple stressors, sees self as a victim , behavior suggesting loss of control, behavior suggesting impulsivity   Protective Factors: no current homicidal ideation, access to mental health treatment, supportive family, supportive friends   Weapons: none   The following steps have been taken to ensure weapons are properly secured: not applicable   Based on today's assessment, Luis Vicente presents the following risk of harm to others: minimal    The following interventions are recommended: referral for inpatient admission      Namita Ruffin 07/14/22  MS4    This note was completed in part utilizing [employer] Direct Software  Grammatical, translation, syntax errors, random word insertions, spelling mistakes, and incomplete sentences may be an occasional consequence of this system secondary to software limitations with voice recognition, ambient noise, and hardware issues  If you have any questions or concerns about the content, text, or information contained within the body of this dictation, please contact the provider for clarification

## 2022-07-14 NOTE — ASSESSMENT & PLAN NOTE
Present on admission  Patient noted feeling no symptoms  Currently untreated  Patient has a 70 year pack history quit 5 years ago

## 2022-07-14 NOTE — ASSESSMENT & PLAN NOTE
Present on admission  This condition is a risk factor for SI, self harm, and attention seeking behavior  PLAN  Psych Consult would be appreciated  Please reconcile home medication as list is extensive and patient admits doubts as the medications are prepared for them

## 2022-07-14 NOTE — PROGRESS NOTES
[facility]  Progress Note - Cj Younger 1970, 49 y o  female MRN: [id number]  Unit/Bed#: S -01 Encounter: [id number]  Primary Care Provider: JHONATAN Glaser   Date and time admitted to hospital: 7/13/2022  2:53 PM    * Suicidal deliberate poisoning [facility])  Assessment & Plan  Patient noted taking 14 pills of dapekote at 14:00 on [id number] as well as medical marajuana and methylphenidate (purchased on street) subsequently  called EMS and was brought to ED  Dr Brett Stewart psychiatrist Josias Ward prescribes the dapekote for schizoaffective disorder bipolar unknown type  Patient has not eat today  Patient is tired, had chest pain which has subsided  Patient endorses H/A is tearful and initally was noncommunicative  SOCIAL: 5 years since last 2 packs70 pack year  No alcohol  Gaffney Beery couple hits each night  Bathsalts was sliped the salts took unintentially  Meth amphetamines snort 2-3 most days from street  PLAN: Stabilize patient medically and form toxicology viewpoint  IF ANY QUESTIONS THAT CANT BE ANSWERED [facility] [place] [phone]  Toxicology Recommendation:  1  Valproic Acid Level: No longer need to trend unless patient becomes symptomatic: CNS depression  However check valproic acid levels prior to restarting if deemed appropriate by psych  2  Seizure precautions for 24hrs since ingestion (Pt reports ingestion at 14:00 [id number]) as patient is on bupropion which lowers seizure threshold even at therapeutic doses  3  IF patient becomes tachycardia Serial EKG monitoring for QRS widening: IF QRS wide treat as follows  -QRS>120: give 1-2 mEq/kg NaBicarb bolus & Titrate until pH<7 55 K>4 0  GOAL ATc<120  4  Hold Venlafaxine, bupropion, citalopram, escitalopram    5  Hold Valproic Acid  6  VA known to cause encephalopathy and hepatotoxicity:   -if patient develops encephalopathy  Repeat ammonia level     -If patient develops toxicity treat w/ L-carntitine IV 100mg/Kg followed by 50mg/kg Q6  One on one observation including monitoring for CNS depression  Psych Consult  IVF  ABG Pending as of [id number]    GOALS: No laboratory or symptomatic evidence of drug OD  GOAL MEASUREMENTS:  Free of tachycardia 48hrs post OD  GOAL ATc<120   No CNS Depression sxs  No sxs of hepatotoxicity     Schizoaffective disorder, bipolar type [facility])  Assessment & Plan  Present on admission  This condition is a risk factor for SI, self harm, and attention seeking behavior  PLAN  Psych Consult would be appreciated  Please reconcile home medication as list is extensive and patient admits doubts as the medications are prepared for them  Major depressive disorder  Assessment & Plan  Present on admission  PLAN  Hold bupropion, venlafaxine, citalopram escitalopram  Consider using alternatives outpatient and these drugs have a risk of abuse  Psych Consult would be appreciated  Please reconcile home medication as list is extensive and patient admits doubts as the medications are prepared for them  Mood disorder [facility])  Assessment & Plan  Present on admission   PLAN  Psych Consult would be appreciated  Please reconcile home medication as list is extensive and patient admits doubts as the medications are prepared for them  Substance abuse [facility])  Assessment & Plan  Present on admission   PLAN  Please see psych consult note      Potential for cognitive impairment  Assessment & Plan  Present on admission   PLAN  Formal testing should be considered  COPD (chronic obstructive pulmonary disease) ([facility] [address] )  Assessment & Plan  Present on admission  Patient noted feeling no symptoms  Currently untreated  Patient has a 70 year pack history quit 5 years ago     PLAN  Monitor SpO2 and RR as Valproic acid OD can suppress respiratory drive for 24 hrs post ingestion (14:00 [id number])    Hypothyroidism  Assessment & Plan  Present on admission  PLAN  Continue home medications     Benign essential hypertension  Assessment & Plan  Present on admission   PLAN   Continue home medications      MAG (obstructive sleep apnea)  Assessment & Plan  Patient noted needing a CPAP if placed inpatient  PLAN  CPAP has been made available bedside to patient    Overactive bladder  Assessment & Plan  Present on admission   PLAN   Continue home medications      Mixed hyperlipidemia  Assessment & Plan  Present on admission   PLAN   Continue home medications            VTE Pharmacologic Prophylaxis: VTE Score: 7 Moderate Risk (Score 3-4) - Pharmacological DVT Prophylaxis Ordered: apixaban (Eliquis)  Patient Centered Rounds: I performed bedside rounds with nursing staff today  Discussions with Specialists or Other Care Team Provider: Psych is seeing patient Toxicology is also following patient  Education and Discussions with Family / Patient: Updated  (father) via phone  Current Length of Stay: 0 day(s)  Current Patient Status: Observation   Discharge Plan: Anticipate discharge in 24-48 hrs to inpatient psych  Code Status: Level 1 - Full Code    Subjective:   Patient was seen this morning at bedside, and noted "I messed up, I know but I want to go home" Patient denied any stomach pain, H/A, chest pain, palpations, decreased mental status  Objective:     Vitals:   Temp (24hrs), Av 1 °F (36 7 °C), Min:97 5 °F (36 4 °C), Max:98 8 °F (37 1 °C)    Temp:  [97 5 °F (36 4 °C)-98 8 °F (37 1 °C)] 98 8 °F (37 1 °C)  HR:  [58-75] 68  Resp:  [16-18] 18  BP: (121-147)/(58-89) 134/79  SpO2:  [92 %-98 %] 95 %  There is no height or weight on file to calculate BMI  Input and Output Summary (last 24 hours):   No intake or output data in the 24 hours ending 22 1140    Physical Exam:   Physical Exam  Vitals and nursing note reviewed  Constitutional:       General: She is not in acute distress  Appearance: She is well-developed  She is obese  She is not ill-appearing, toxic-appearing or diaphoretic  HENT:      Head: Normocephalic and atraumatic  Nose: Nose normal       Mouth/Throat:      Pharynx: No oropharyngeal exudate  Eyes:      General: No scleral icterus  Right eye: No discharge  Left eye: No discharge  Conjunctiva/sclera: Conjunctivae normal    Cardiovascular:      Rate and Rhythm: Normal rate and regular rhythm  Pulmonary:      Effort: Pulmonary effort is normal  No respiratory distress  Breath sounds: Normal breath sounds  Abdominal:      Palpations: Abdomen is soft  There is no mass  Tenderness: There is no abdominal tenderness  There is no guarding or rebound  Hernia: No hernia is present  Musculoskeletal:      Cervical back: Neck supple  Skin:     General: Skin is warm and dry  Coloration: Skin is not jaundiced or pale  Findings: No rash  Neurological:      Mental Status: She is alert and oriented to person, place, and time  Mental status is at baseline  Motor: No weakness  Patient showed no signes of toxicity, encephalopathy, disorientation, CNS depression, delirium       Additional Data:     Labs:  Results from last 7 days   Lab Units 07/14/22  0559 07/13/22  1520   WBC Thousand/uL 9 32 11 56*   HEMOGLOBIN g/dL 14 2 14 6   HEMATOCRIT % 44 4 45 5   PLATELETS Thousands/uL 193 177   NEUTROS PCT %  --  58   LYMPHS PCT %  --  28   MONOS PCT %  --  11   EOS PCT %  --  2     Results from last 7 days   Lab Units 07/14/22  0259   SODIUM mmol/L 140   POTASSIUM mmol/L 3 8   CHLORIDE mmol/L 109*   CO2 mmol/L 23   BUN mg/dL 10   CREATININE mg/dL 0 61   ANION GAP mmol/L 8   CALCIUM mg/dL 8 5   ALBUMIN g/dL 3 5   TOTAL BILIRUBIN mg/dL 0 30   ALK PHOS U/L 55   ALT U/L 18   AST U/L 13   GLUCOSE RANDOM mg/dL 86         Results from last 7 days   Lab Units 07/14/22  0027   POC GLUCOSE mg/dl 82               Lines/Drains:  Invasive Devices  Report    Peripheral Intravenous Line  Duration           Peripheral IV 07/13/22 Left Forearm <1 day Telemetry:  Telemetry Orders (From admission, onward)             24 Hour Telemetry Monitoring  Continuous x 24 Hours (Telem)        References:    Telemetry Guidelines   Question:  Reason for 24 Hour Telemetry  Answer:  Metabolic/Electrolyte Disturbance with High Probability of Dysrhythmia (K level <3 or >6, or KCL infusion >=10mEq/hr)                 Telemetry Reviewed: Normal Sinus Rhythm  Indication for Continued Telemetry Use: Drug overdose known to cause cardiac arrhthymias             Imaging: No pertinent imaging reviewed      Recent Cultures (last 7 days):         Last 24 Hours Medication List:   Current Facility-Administered Medications   Medication Dose Route Frequency Provider Last Rate    acetaminophen  650 mg Oral Q6H PRN Riley Otero MD      albuterol  2 puff Inhalation Q6H PRN Riley Otero MD      amLODIPine  5 mg Oral Daily Riley Otero MD      cholecalciferol  5,000 Units Oral Daily Riley Otero MD      cholestyramine sugar free  4 g Oral BID Joel Harrell MD      divalproex sodium  1,000 mg Oral Daily Riley Otero MD      enoxaparin  40 mg Subcutaneous Daily Riley Otero MD      estradiol  0 5 mg Oral Daily Riley Otero MD      fluticasone  2 puff Inhalation BID Riley Otero MD      furosemide  20 mg Oral Daily Riley Otero MD      ketotifen  1 drop Both Eyes BID Riley Otero MD      levothyroxine  100 mcg Oral Early Morning Riley Otero MD      loratadine  10 mg Oral Daily Riley Otero MD      losartan  50 mg Oral Daily Riley Otero MD      lurasidone  20 mg Oral Daily With Vaibhav Banks MD      Lurasidone HCl  60 mg Oral Daily With Vaibhav Banks MD      melatonin  3 mg Oral HS PRN Joel Harrell MD      metoprolol tartrate  25 mg Oral Q12H [address] [place] [address]  MD Lynette      ondansetron  4 mg Intravenous Q6H PRN Jamel Samuel MD      oxybutynin  5 mg Oral Daily Jamel Samuel MD      pantoprazole  40 mg Oral BID AC Beth Jacobsen MD      pilocarpine  5 mg Oral TID Jamel Samuel MD      rOPINIRole  0 5 mg Oral HS Jamel Samuel MD      sodium chloride  75 mL/hr Intravenous Continuous Jamel Samuel MD 75 mL/hr (07/14/22 1105)    topiramate  100 mg Oral BID Jamel Samuel MD          Today, Patient Was Seen By: Jamel Samuel MD    **Please Note: This note may have been constructed using a voice recognition system  **

## 2022-07-14 NOTE — QUICK NOTE
The patient's valproic acid levels have peaked and are currently down trending  As long as the patient has normal mentation, there is no further need to obtain Q2 labs  If the patient has CNS depression, lethargy, the patient will need continued Q3 VPA, CMP, ammonia levels  If the patient develops hepatotoxicity or encephalopathy, please start L-carnitine  mg/kg followed by 50 mg/kg Q6 hours  I would not restart this patient's valproic acid until it is in a therapeutic range  This can be rechecked later this evening or in the morning if there is a desire to restart the patient on her valproic acid  The patient will still require the full 24 hour post ingestion observation period due to risk of delayed seizures from bupropion  This patient can be cleared from a toxicologic standpoint after that 24 hour period as long as the patient's mentation is at baseline, vital signs are within normal limits, and patient is ambulatory  room air

## 2022-07-14 NOTE — ASSESSMENT & PLAN NOTE
Present on admission  Patient noted feeling no symptoms  Currently untreated  Patient has a 70 year pack history quit 5 years ago     PLAN  Monitor SpO2 and RR as Valproic acid OD can suppress respiratory drive for 24 hrs post ingestion (14:00 [id number])

## 2022-07-14 NOTE — CASE MANAGEMENT
Case Management Progress Note    Patient name Lowell Le  Location S /S -01 MRN [id number]  : 1970 Date 2022       LOS (days): 0  Geometric Mean LOS (GMLOS) (days):   Days to [address]:        OBJECTIVE:        Current admission status: Inpatient  Preferred Pharmacy:   [address], [address]   [address] [place] [facility]  Phone: [phone] Fax: [phone]    Primary Care Provider: JHONATAN Terry    Primary Insurance: MEDICARE  Secondary Insurance: [address] [place] [address]    PROGRESS NOTE:    CM contacted the following facilities regarding [address] placement:    [facility] (p: [phone]) has beds available  Cm faxed clinicals to f: [phone] per request    [facility] (p: [phone]) has beds available  CM faxed clinicals to f: [phone] per request    [facility] (p: [phone]) Left message for intake-Sesar regarding bed availability    [facility] (p: [phone]) has beds available  CM faxed clinicals to f: [phone] per request    [facility] (p: [phone]) does not have beds available today, but will tomorrow    CM faxed clinicals to f: [phone]

## 2022-07-15 ENCOUNTER — HOSPITAL ENCOUNTER (INPATIENT)
Facility: HOSPITAL | Age: 52
LOS: 11 days | Discharge: HOME | DRG: 885 | End: 2022-07-26
Attending: STUDENT IN AN ORGANIZED HEALTH CARE EDUCATION/TRAINING PROGRAM | Admitting: STUDENT IN AN ORGANIZED HEALTH CARE EDUCATION/TRAINING PROGRAM
Payer: MEDICARE

## 2022-07-15 VITALS
DIASTOLIC BLOOD PRESSURE: 83 MMHG | TEMPERATURE: 98 F | HEART RATE: 62 BPM | SYSTOLIC BLOOD PRESSURE: 129 MMHG | BODY MASS INDEX: 45.9 KG/M2 | RESPIRATION RATE: 18 BRPM | HEIGHT: 66 IN | OXYGEN SATURATION: 94 %

## 2022-07-15 DIAGNOSIS — Z51.81 THERAPEUTIC DRUG MONITORING: ICD-10-CM

## 2022-07-15 DIAGNOSIS — F39 MOOD DISORDER (HCC): Primary | ICD-10-CM

## 2022-07-15 DIAGNOSIS — F41.9 ANXIETY: ICD-10-CM

## 2022-07-15 DIAGNOSIS — Z00.8 MEDICAL CLEARANCE FOR PSYCHIATRIC ADMISSION: ICD-10-CM

## 2022-07-15 LAB
ALBUMIN SERPL BCP-MCNC: 3.5 G/DL (ref 3.5–5)
ALP SERPL-CCNC: 56 U/L (ref 34–104)
ALT SERPL W P-5'-P-CCNC: 18 U/L (ref 7–52)
ANION GAP SERPL CALCULATED.3IONS-SCNC: 7 MMOL/L (ref 4–13)
AST SERPL W P-5'-P-CCNC: 13 U/L (ref 13–39)
ATRIAL RATE: 60 BPM
ATRIAL RATE: 60 BPM
ATRIAL RATE: 61 BPM
BASOPHILS # BLD AUTO: 0.03 THOUSANDS/ΜL (ref 0–0.1)
BASOPHILS NFR BLD AUTO: 0 % (ref 0–1)
BILIRUB SERPL-MCNC: 0.41 MG/DL (ref 0.2–1)
BUN SERPL-MCNC: 10 MG/DL (ref 5–25)
CALCIUM SERPL-MCNC: 8.7 MG/DL (ref 8.4–10.2)
CHLORIDE SERPL-SCNC: 109 MMOL/L (ref 96–108)
CO2 SERPL-SCNC: 23 MMOL/L (ref 21–32)
CREAT SERPL-MCNC: 0.62 MG/DL (ref 0.6–1.3)
EOSINOPHIL # BLD AUTO: 0.07 THOUSAND/ΜL (ref 0–0.61)
EOSINOPHIL NFR BLD AUTO: 1 % (ref 0–6)
ERYTHROCYTE [DISTWIDTH] IN BLOOD BY AUTOMATED COUNT: 12.9 % (ref 11.6–15.1)
GFR SERPL CREATININE-BSD FRML MDRD: 105 ML/MIN/1.73SQ M
GLUCOSE SERPL-MCNC: 96 MG/DL (ref 65–140)
HCT VFR BLD AUTO: 46 % (ref 34.8–46.1)
HGB BLD-MCNC: 14.9 G/DL (ref 11.5–15.4)
IMM GRANULOCYTES # BLD AUTO: 0.08 THOUSAND/UL (ref 0–0.2)
IMM GRANULOCYTES NFR BLD AUTO: 1 % (ref 0–2)
LYMPHOCYTES # BLD AUTO: 2.51 THOUSANDS/ΜL (ref 0.6–4.47)
LYMPHOCYTES NFR BLD AUTO: 29 % (ref 14–44)
MCH RBC QN AUTO: 30.3 PG (ref 26.8–34.3)
MCHC RBC AUTO-ENTMCNC: 32.4 G/DL (ref 31.4–37.4)
MCV RBC AUTO: 94 FL (ref 82–98)
MONOCYTES # BLD AUTO: 0.91 THOUSAND/ΜL (ref 0.17–1.22)
MONOCYTES NFR BLD AUTO: 11 % (ref 4–12)
NEUTROPHILS # BLD AUTO: 4.95 THOUSANDS/ΜL (ref 1.85–7.62)
NEUTS SEG NFR BLD AUTO: 58 % (ref 43–75)
NRBC BLD AUTO-RTO: 0 /100 WBCS
P AXIS: 30 DEGREES
P AXIS: 35 DEGREES
P AXIS: 45 DEGREES
PLATELET # BLD AUTO: 194 THOUSANDS/UL (ref 149–390)
PMV BLD AUTO: 10.5 FL (ref 8.9–12.7)
POTASSIUM SERPL-SCNC: 3.7 MMOL/L (ref 3.5–5.3)
PR INTERVAL: 192 MS
PR INTERVAL: 194 MS
PR INTERVAL: 196 MS
PROT SERPL-MCNC: 6.3 G/DL (ref 6.4–8.4)
QRS AXIS: -13 DEGREES
QRS AXIS: -13 DEGREES
QRS AXIS: -17 DEGREES
QRSD INTERVAL: 90 MS
QRSD INTERVAL: 92 MS
QRSD INTERVAL: 96 MS
QT INTERVAL: 402 MS
QT INTERVAL: 402 MS
QT INTERVAL: 408 MS
QTC INTERVAL: 402 MS
QTC INTERVAL: 402 MS
QTC INTERVAL: 410 MS
RBC # BLD AUTO: 4.91 MILLION/UL (ref 3.81–5.12)
SODIUM SERPL-SCNC: 139 MMOL/L (ref 135–147)
T WAVE AXIS: 25 DEGREES
T WAVE AXIS: 26 DEGREES
T WAVE AXIS: 32 DEGREES
VALPROATE SERPL-MCNC: 102 UG/ML (ref 50–100)
VENTRICULAR RATE: 60 BPM
VENTRICULAR RATE: 60 BPM
VENTRICULAR RATE: 61 BPM
WBC # BLD AUTO: 8.55 THOUSAND/UL (ref 4.31–10.16)

## 2022-07-15 PROCEDURE — 93010 ELECTROCARDIOGRAM REPORT: CPT | Performed by: INTERNAL MEDICINE

## 2022-07-15 PROCEDURE — 99239 HOSP IP/OBS DSCHRG MGMT >30: CPT | Performed by: INTERNAL MEDICINE

## 2022-07-15 PROCEDURE — 80053 COMPREHEN METABOLIC PANEL: CPT

## 2022-07-15 PROCEDURE — 85025 COMPLETE CBC W/AUTO DIFF WBC: CPT

## 2022-07-15 PROCEDURE — 80164 ASSAY DIPROPYLACETIC ACD TOT: CPT

## 2022-07-15 RX ORDER — AMOXICILLIN 250 MG
1 CAPSULE ORAL DAILY PRN
Status: DISCONTINUED | OUTPATIENT
Start: 2022-07-15 | End: 2022-07-15

## 2022-07-15 RX ORDER — IBUPROFEN 400 MG/1
800 TABLET ORAL EVERY 8 HOURS PRN
Status: DISCONTINUED | OUTPATIENT
Start: 2022-07-15 | End: 2022-07-26 | Stop reason: HOSPADM

## 2022-07-15 RX ORDER — POLYETHYLENE GLYCOL 3350 17 G/17G
17 POWDER, FOR SOLUTION ORAL DAILY PRN
Status: CANCELLED | OUTPATIENT
Start: 2022-07-15

## 2022-07-15 RX ORDER — KETOTIFEN FUMARATE 0.35 MG/ML
1 SOLUTION/ DROPS OPHTHALMIC 2 TIMES DAILY
Status: DISCONTINUED | OUTPATIENT
Start: 2022-07-16 | End: 2022-07-26 | Stop reason: HOSPADM

## 2022-07-15 RX ORDER — HALOPERIDOL 1 MG/1
2 TABLET ORAL
Status: DISCONTINUED | OUTPATIENT
Start: 2022-07-15 | End: 2022-07-26 | Stop reason: HOSPADM

## 2022-07-15 RX ORDER — LOSARTAN POTASSIUM 50 MG/1
50 TABLET ORAL DAILY
Status: DISCONTINUED | OUTPATIENT
Start: 2022-07-16 | End: 2022-07-26 | Stop reason: HOSPADM

## 2022-07-15 RX ORDER — LORAZEPAM 2 MG/ML
1 INJECTION INTRAMUSCULAR EVERY 4 HOURS PRN
Status: CANCELLED | OUTPATIENT
Start: 2022-07-15

## 2022-07-15 RX ORDER — LORAZEPAM 2 MG/ML
2 INJECTION INTRAMUSCULAR
Status: CANCELLED | OUTPATIENT
Start: 2022-07-15

## 2022-07-15 RX ORDER — BENZTROPINE MESYLATE 1 MG/ML
1 INJECTION INTRAMUSCULAR; INTRAVENOUS
Status: DISCONTINUED | OUTPATIENT
Start: 2022-07-15 | End: 2022-07-26 | Stop reason: HOSPADM

## 2022-07-15 RX ORDER — MELATONIN
5000 DAILY
Status: DISCONTINUED | OUTPATIENT
Start: 2022-07-16 | End: 2022-07-26 | Stop reason: HOSPADM

## 2022-07-15 RX ORDER — LEVOTHYROXINE SODIUM 0.1 MG/1
100 TABLET ORAL
Status: DISCONTINUED | OUTPATIENT
Start: 2022-07-16 | End: 2022-07-26 | Stop reason: HOSPADM

## 2022-07-15 RX ORDER — HALOPERIDOL 5 MG/ML
2.5 INJECTION INTRAMUSCULAR
Status: CANCELLED | OUTPATIENT
Start: 2022-07-15

## 2022-07-15 RX ORDER — IBUPROFEN 400 MG/1
400 TABLET ORAL EVERY 4 HOURS PRN
Status: DISCONTINUED | OUTPATIENT
Start: 2022-07-15 | End: 2022-07-26 | Stop reason: HOSPADM

## 2022-07-15 RX ORDER — MINERAL OIL AND PETROLATUM 150; 830 MG/G; MG/G
1 OINTMENT OPHTHALMIC
Status: DISCONTINUED | OUTPATIENT
Start: 2022-07-15 | End: 2022-07-26 | Stop reason: HOSPADM

## 2022-07-15 RX ORDER — PANTOPRAZOLE SODIUM 40 MG/1
40 TABLET, DELAYED RELEASE ORAL
Status: CANCELLED | OUTPATIENT
Start: 2022-07-15

## 2022-07-15 RX ORDER — ROPINIROLE 0.5 MG/1
0.5 TABLET, FILM COATED ORAL
Status: DISCONTINUED | OUTPATIENT
Start: 2022-07-15 | End: 2022-07-26 | Stop reason: HOSPADM

## 2022-07-15 RX ORDER — MAGNESIUM HYDROXIDE/ALUMINUM HYDROXICE/SIMETHICONE 120; 1200; 1200 MG/30ML; MG/30ML; MG/30ML
30 SUSPENSION ORAL EVERY 4 HOURS PRN
Status: DISCONTINUED | OUTPATIENT
Start: 2022-07-15 | End: 2022-07-26 | Stop reason: HOSPADM

## 2022-07-15 RX ORDER — HALOPERIDOL 5 MG/ML
5 INJECTION INTRAMUSCULAR
Status: DISCONTINUED | OUTPATIENT
Start: 2022-07-15 | End: 2022-07-26 | Stop reason: HOSPADM

## 2022-07-15 RX ORDER — MELATONIN
5000 DAILY
Status: CANCELLED | OUTPATIENT
Start: 2022-07-16

## 2022-07-15 RX ORDER — LORAZEPAM 2 MG/ML
2 INJECTION INTRAMUSCULAR
Status: DISCONTINUED | OUTPATIENT
Start: 2022-07-15 | End: 2022-07-26 | Stop reason: HOSPADM

## 2022-07-15 RX ORDER — MAGNESIUM HYDROXIDE/ALUMINUM HYDROXICE/SIMETHICONE 120; 1200; 1200 MG/30ML; MG/30ML; MG/30ML
30 SUSPENSION ORAL EVERY 4 HOURS PRN
Status: CANCELLED | OUTPATIENT
Start: 2022-07-15

## 2022-07-15 RX ORDER — POLYETHYLENE GLYCOL 3350 17 G/17G
17 POWDER, FOR SOLUTION ORAL DAILY PRN
Status: DISCONTINUED | OUTPATIENT
Start: 2022-07-15 | End: 2022-07-26 | Stop reason: HOSPADM

## 2022-07-15 RX ORDER — BENZTROPINE MESYLATE 1 MG/1
1 TABLET ORAL
Status: DISCONTINUED | OUTPATIENT
Start: 2022-07-15 | End: 2022-07-26 | Stop reason: HOSPADM

## 2022-07-15 RX ORDER — LOSARTAN POTASSIUM 50 MG/1
50 TABLET ORAL DAILY
Status: CANCELLED | OUTPATIENT
Start: 2022-07-16

## 2022-07-15 RX ORDER — ALBUTEROL SULFATE 90 UG/1
2 AEROSOL, METERED RESPIRATORY (INHALATION) EVERY 6 HOURS PRN
Status: CANCELLED | OUTPATIENT
Start: 2022-07-15

## 2022-07-15 RX ORDER — ALBUTEROL SULFATE 90 UG/1
2 AEROSOL, METERED RESPIRATORY (INHALATION) EVERY 6 HOURS PRN
Status: DISCONTINUED | OUTPATIENT
Start: 2022-07-15 | End: 2022-07-26 | Stop reason: HOSPADM

## 2022-07-15 RX ORDER — FLUTICASONE PROPIONATE 110 UG/1
2 AEROSOL, METERED RESPIRATORY (INHALATION) 2 TIMES DAILY
Status: CANCELLED | OUTPATIENT
Start: 2022-07-15

## 2022-07-15 RX ORDER — BENZTROPINE MESYLATE 1 MG/ML
1 INJECTION INTRAMUSCULAR; INTRAVENOUS
Status: CANCELLED | OUTPATIENT
Start: 2022-07-15

## 2022-07-15 RX ORDER — PANTOPRAZOLE SODIUM 40 MG/1
40 TABLET, DELAYED RELEASE ORAL
Status: DISCONTINUED | OUTPATIENT
Start: 2022-07-16 | End: 2022-07-26 | Stop reason: HOSPADM

## 2022-07-15 RX ORDER — IBUPROFEN 400 MG/1
400 TABLET ORAL EVERY 4 HOURS PRN
Status: CANCELLED | OUTPATIENT
Start: 2022-07-15

## 2022-07-15 RX ORDER — AMOXICILLIN 250 MG
1 CAPSULE ORAL DAILY PRN
Status: CANCELLED | OUTPATIENT
Start: 2022-07-15

## 2022-07-15 RX ORDER — MINERAL OIL AND PETROLATUM 150; 830 MG/G; MG/G
1 OINTMENT OPHTHALMIC
Status: CANCELLED | OUTPATIENT
Start: 2022-07-15

## 2022-07-15 RX ORDER — TOPIRAMATE 100 MG/1
100 TABLET, FILM COATED ORAL 2 TIMES DAILY
Status: DISCONTINUED | OUTPATIENT
Start: 2022-07-16 | End: 2022-07-26 | Stop reason: HOSPADM

## 2022-07-15 RX ORDER — LORAZEPAM 2 MG/ML
1 INJECTION INTRAMUSCULAR EVERY 4 HOURS PRN
Status: DISCONTINUED | OUTPATIENT
Start: 2022-07-15 | End: 2022-07-26 | Stop reason: HOSPADM

## 2022-07-15 RX ORDER — OXYBUTYNIN CHLORIDE 5 MG/1
5 TABLET, EXTENDED RELEASE ORAL DAILY
Status: CANCELLED | OUTPATIENT
Start: 2022-07-16

## 2022-07-15 RX ORDER — OXYBUTYNIN CHLORIDE 5 MG/1
5 TABLET, EXTENDED RELEASE ORAL DAILY
Status: DISCONTINUED | OUTPATIENT
Start: 2022-07-16 | End: 2022-07-26 | Stop reason: HOSPADM

## 2022-07-15 RX ORDER — BENZTROPINE MESYLATE 1 MG/ML
0.5 INJECTION INTRAMUSCULAR; INTRAVENOUS
Status: CANCELLED | OUTPATIENT
Start: 2022-07-15

## 2022-07-15 RX ORDER — HALOPERIDOL 1 MG/1
2 TABLET ORAL
Status: CANCELLED | OUTPATIENT
Start: 2022-07-15

## 2022-07-15 RX ORDER — PILOCARPINE HYDROCHLORIDE 5 MG/1
5 TABLET, FILM COATED ORAL 3 TIMES DAILY
Status: DISCONTINUED | OUTPATIENT
Start: 2022-07-15 | End: 2022-07-26 | Stop reason: HOSPADM

## 2022-07-15 RX ORDER — HYDROXYZINE 50 MG/1
50 TABLET, FILM COATED ORAL
Status: DISCONTINUED | OUTPATIENT
Start: 2022-07-15 | End: 2022-07-26 | Stop reason: HOSPADM

## 2022-07-15 RX ORDER — HYDROXYZINE HYDROCHLORIDE 25 MG/1
25 TABLET, FILM COATED ORAL
Status: CANCELLED | OUTPATIENT
Start: 2022-07-15

## 2022-07-15 RX ORDER — LORAZEPAM 1 MG/1
1 TABLET ORAL
Status: CANCELLED | OUTPATIENT
Start: 2022-07-15

## 2022-07-15 RX ORDER — LANOLIN ALCOHOL/MO/W.PET/CERES
3 CREAM (GRAM) TOPICAL
Status: CANCELLED | OUTPATIENT
Start: 2022-07-15

## 2022-07-15 RX ORDER — HYDROXYZINE 50 MG/1
50 TABLET, FILM COATED ORAL
Status: CANCELLED | OUTPATIENT
Start: 2022-07-15

## 2022-07-15 RX ORDER — IBUPROFEN 400 MG/1
800 TABLET ORAL EVERY 8 HOURS PRN
Status: CANCELLED | OUTPATIENT
Start: 2022-07-15

## 2022-07-15 RX ORDER — KETOTIFEN FUMARATE 0.35 MG/ML
1 SOLUTION/ DROPS OPHTHALMIC 2 TIMES DAILY
Status: CANCELLED | OUTPATIENT
Start: 2022-07-15

## 2022-07-15 RX ORDER — LORATADINE 10 MG/1
10 TABLET ORAL DAILY
Status: DISCONTINUED | OUTPATIENT
Start: 2022-07-16 | End: 2022-07-26 | Stop reason: HOSPADM

## 2022-07-15 RX ORDER — LOPERAMIDE HCL 1 MG/7.5ML
2 SUSPENSION ORAL 3 TIMES DAILY PRN
Status: CANCELLED | OUTPATIENT
Start: 2022-07-15

## 2022-07-15 RX ORDER — FLUTICASONE PROPIONATE 110 UG/1
2 AEROSOL, METERED RESPIRATORY (INHALATION) 2 TIMES DAILY
Status: DISCONTINUED | OUTPATIENT
Start: 2022-07-16 | End: 2022-07-15 | Stop reason: CLARIF

## 2022-07-15 RX ORDER — HALOPERIDOL 5 MG/1
5 TABLET ORAL
Status: DISCONTINUED | OUTPATIENT
Start: 2022-07-15 | End: 2022-07-26 | Stop reason: HOSPADM

## 2022-07-15 RX ORDER — LORAZEPAM 1 MG/1
1 TABLET ORAL
Status: DISCONTINUED | OUTPATIENT
Start: 2022-07-15 | End: 2022-07-26 | Stop reason: HOSPADM

## 2022-07-15 RX ORDER — HALOPERIDOL 5 MG/1
5 TABLET ORAL
Status: CANCELLED | OUTPATIENT
Start: 2022-07-15

## 2022-07-15 RX ORDER — AMOXICILLIN 250 MG
1 CAPSULE ORAL DAILY PRN
Status: DISCONTINUED | OUTPATIENT
Start: 2022-07-15 | End: 2022-07-26 | Stop reason: HOSPADM

## 2022-07-15 RX ORDER — LORAZEPAM 2 MG/ML
1 INJECTION INTRAMUSCULAR
Status: DISCONTINUED | OUTPATIENT
Start: 2022-07-15 | End: 2022-07-18

## 2022-07-15 RX ORDER — LOPERAMIDE HYDROCHLORIDE 2 MG/1
2 CAPSULE ORAL 3 TIMES DAILY PRN
Status: DISCONTINUED | OUTPATIENT
Start: 2022-07-15 | End: 2022-07-26 | Stop reason: HOSPADM

## 2022-07-15 RX ORDER — AMLODIPINE BESYLATE 5 MG/1
5 TABLET ORAL DAILY
Status: CANCELLED | OUTPATIENT
Start: 2022-07-16

## 2022-07-15 RX ORDER — PROPRANOLOL HYDROCHLORIDE 20 MG/1
10 TABLET ORAL EVERY 8 HOURS PRN
Status: CANCELLED | OUTPATIENT
Start: 2022-07-15

## 2022-07-15 RX ORDER — LANOLIN ALCOHOL/MO/W.PET/CERES
3 CREAM (GRAM) TOPICAL
Qty: 30 TABLET | Refills: 0 | Status: SHIPPED | OUTPATIENT
Start: 2022-07-15 | End: 2022-08-14

## 2022-07-15 RX ORDER — HALOPERIDOL 5 MG/ML
5 INJECTION INTRAMUSCULAR
Status: CANCELLED | OUTPATIENT
Start: 2022-07-15

## 2022-07-15 RX ORDER — IBUPROFEN 600 MG/1
600 TABLET ORAL EVERY 6 HOURS PRN
Status: CANCELLED | OUTPATIENT
Start: 2022-07-15

## 2022-07-15 RX ORDER — MONTELUKAST SODIUM 4 MG/1
1 TABLET, CHEWABLE ORAL 2 TIMES DAILY
Status: DISCONTINUED | OUTPATIENT
Start: 2022-07-15 | End: 2022-07-15 | Stop reason: HOSPADM

## 2022-07-15 RX ORDER — HALOPERIDOL 5 MG/ML
2.5 INJECTION INTRAMUSCULAR
Status: DISCONTINUED | OUTPATIENT
Start: 2022-07-15 | End: 2022-07-18

## 2022-07-15 RX ORDER — BENZTROPINE MESYLATE 1 MG/ML
0.5 INJECTION INTRAMUSCULAR; INTRAVENOUS
Status: DISCONTINUED | OUTPATIENT
Start: 2022-07-15 | End: 2022-07-18

## 2022-07-15 RX ORDER — LANOLIN ALCOHOL/MO/W.PET/CERES
3 CREAM (GRAM) TOPICAL
Status: DISCONTINUED | OUTPATIENT
Start: 2022-07-15 | End: 2022-07-26 | Stop reason: HOSPADM

## 2022-07-15 RX ORDER — LORAZEPAM 2 MG/ML
1 INJECTION INTRAMUSCULAR
Status: CANCELLED | OUTPATIENT
Start: 2022-07-15

## 2022-07-15 RX ORDER — ROPINIROLE 0.25 MG/1
0.5 TABLET, FILM COATED ORAL
Status: CANCELLED | OUTPATIENT
Start: 2022-07-15

## 2022-07-15 RX ORDER — AMLODIPINE BESYLATE 5 MG/1
5 TABLET ORAL DAILY
Status: DISCONTINUED | OUTPATIENT
Start: 2022-07-16 | End: 2022-07-26 | Stop reason: HOSPADM

## 2022-07-15 RX ORDER — BENZTROPINE MESYLATE 1 MG/1
1 TABLET ORAL
Status: CANCELLED | OUTPATIENT
Start: 2022-07-15

## 2022-07-15 RX ORDER — TOPIRAMATE 100 MG/1
100 TABLET, FILM COATED ORAL 2 TIMES DAILY
Status: CANCELLED | OUTPATIENT
Start: 2022-07-15

## 2022-07-15 RX ORDER — MONTELUKAST SODIUM 4 MG/1
1 TABLET, CHEWABLE ORAL 2 TIMES DAILY
Qty: 60 TABLET | Refills: 0
Start: 2022-07-16 | End: 2022-09-20

## 2022-07-15 RX ORDER — FUROSEMIDE 20 MG/1
20 TABLET ORAL DAILY
Status: CANCELLED | OUTPATIENT
Start: 2022-07-16

## 2022-07-15 RX ORDER — IBUPROFEN 600 MG/1
600 TABLET ORAL EVERY 6 HOURS PRN
Status: DISCONTINUED | OUTPATIENT
Start: 2022-07-15 | End: 2022-07-26 | Stop reason: HOSPADM

## 2022-07-15 RX ORDER — ESTRADIOL 1 MG/1
0.5 TABLET ORAL DAILY
Status: CANCELLED | OUTPATIENT
Start: 2022-07-16

## 2022-07-15 RX ORDER — LORATADINE 10 MG/1
10 TABLET ORAL DAILY
Status: CANCELLED | OUTPATIENT
Start: 2022-07-16

## 2022-07-15 RX ORDER — ESTRADIOL 1 MG/1
0.5 TABLET ORAL DAILY
Status: DISCONTINUED | OUTPATIENT
Start: 2022-07-16 | End: 2022-07-26 | Stop reason: HOSPADM

## 2022-07-15 RX ORDER — PILOCARPINE HYDROCHLORIDE 5 MG/1
5 TABLET, FILM COATED ORAL 3 TIMES DAILY
Status: CANCELLED | OUTPATIENT
Start: 2022-07-15

## 2022-07-15 RX ORDER — HYDROXYZINE HYDROCHLORIDE 25 MG/1
25 TABLET, FILM COATED ORAL
Status: DISCONTINUED | OUTPATIENT
Start: 2022-07-15 | End: 2022-07-26 | Stop reason: HOSPADM

## 2022-07-15 RX ORDER — PROPRANOLOL HYDROCHLORIDE 10 MG/1
10 TABLET ORAL EVERY 8 HOURS PRN
Status: DISCONTINUED | OUTPATIENT
Start: 2022-07-15 | End: 2022-07-26 | Stop reason: HOSPADM

## 2022-07-15 RX ORDER — LEVOTHYROXINE SODIUM 0.1 MG/1
100 TABLET ORAL
Status: CANCELLED | OUTPATIENT
Start: 2022-07-16

## 2022-07-15 RX ORDER — FUROSEMIDE 20 MG/1
20 TABLET ORAL DAILY
Status: DISCONTINUED | OUTPATIENT
Start: 2022-07-16 | End: 2022-07-26 | Stop reason: HOSPADM

## 2022-07-15 RX ORDER — CHOLESTYRAMINE LIGHT 4 G/5.7G
4 POWDER, FOR SUSPENSION ORAL 2 TIMES DAILY
Status: CANCELLED | OUTPATIENT
Start: 2022-07-15

## 2022-07-15 RX ADMIN — LURASIDONE HYDROCHLORIDE 20 MG: 20 TABLET, FILM COATED ORAL at 08:11

## 2022-07-15 RX ADMIN — AMLODIPINE BESYLATE 5 MG: 5 TABLET ORAL at 08:10

## 2022-07-15 RX ADMIN — LEVOTHYROXINE SODIUM 100 MCG: 100 TABLET ORAL at 04:46

## 2022-07-15 RX ADMIN — PILOCARPINE HYDROCHLORIDE 5 MG: 5 TABLET, FILM COATED ORAL at 08:11

## 2022-07-15 RX ADMIN — PANTOPRAZOLE SODIUM 40 MG: 40 TABLET, DELAYED RELEASE ORAL at 17:14

## 2022-07-15 RX ADMIN — TOPIRAMATE 100 MG: 100 TABLET, FILM COATED ORAL at 17:14

## 2022-07-15 RX ADMIN — METOPROLOL TARTRATE 25 MG: 25 TABLET, FILM COATED ORAL at 21:17

## 2022-07-15 RX ADMIN — ROPINIROLE HYDROCHLORIDE 0.5 MG: 0.5 TABLET, FILM COATED ORAL at 21:17

## 2022-07-15 RX ADMIN — TOPIRAMATE 100 MG: 100 TABLET, FILM COATED ORAL at 08:10

## 2022-07-15 RX ADMIN — METOPROLOL TARTRATE 25 MG: 25 TABLET, FILM COATED ORAL at 08:10

## 2022-07-15 RX ADMIN — PANTOPRAZOLE SODIUM 40 MG: 40 TABLET, DELAYED RELEASE ORAL at 04:49

## 2022-07-15 RX ADMIN — LOSARTAN POTASSIUM 50 MG: 50 TABLET, FILM COATED ORAL at 08:10

## 2022-07-15 RX ADMIN — BENZTROPINE MESYLATE 1 MG: 1 INJECTION INTRAMUSCULAR; INTRAVENOUS at 22:27

## 2022-07-15 RX ADMIN — COLESTIPOL HYDROCHLORIDE 1 G: 1 TABLET ORAL at 17:16

## 2022-07-15 RX ADMIN — PILOCARPINE HYDROCHLORIDE 5 MG: 5 TABLET, FILM COATED ORAL at 17:15

## 2022-07-15 RX ADMIN — ESTRADIOL 0.5 MG: 1 TABLET ORAL at 08:12

## 2022-07-15 RX ADMIN — LORAZEPAM 1 MG: 2 INJECTION INTRAMUSCULAR; INTRAVENOUS at 22:15

## 2022-07-15 RX ADMIN — OXYBUTYNIN 5 MG: 5 TABLET, FILM COATED, EXTENDED RELEASE ORAL at 08:10

## 2022-07-15 RX ADMIN — Medication 5000 UNITS: at 08:11

## 2022-07-15 RX ADMIN — ENOXAPARIN SODIUM 40 MG: 40 INJECTION SUBCUTANEOUS at 08:11

## 2022-07-15 RX ADMIN — FUROSEMIDE 20 MG: 20 TABLET ORAL at 08:09

## 2022-07-15 RX ADMIN — LURASIDONE HYDROCHLORIDE 60 MG: 20 TABLET, FILM COATED ORAL at 17:17

## 2022-07-15 RX ADMIN — HALOPERIDOL LACTATE 5 MG: 5 INJECTION, SOLUTION INTRAMUSCULAR at 22:45

## 2022-07-15 RX ADMIN — LORATADINE 10 MG: 10 TABLET ORAL at 08:10

## 2022-07-15 RX ADMIN — IBUPROFEN 600 MG: 600 TABLET ORAL at 22:57

## 2022-07-15 NOTE — PLAN OF CARE
Problem: Potential for Falls  Goal: Patient will remain free of falls  Description: INTERVENTIONS:  - Educate patient/family on patient safety including physical limitations  - Instruct patient to call for assistance with activity   - Consult OT/PT to assist with strengthening/mobility   - Keep Call bell within reach  - Keep bed low and locked with side rails adjusted as appropriate  - Keep care items and personal belongings within reach  - Initiate and maintain comfort rounds  - Make Fall Risk Sign visible to staff  - Offer Toileting every Hours, in advance of need  - Initiate/Maintain alarm  - Obtain necessary fall risk management equipment:   - Apply yellow socks and bracelet for high fall risk patients  - Consider moving patient to room near nurses station  Outcome: Progressing     Problem: Nutrition/Hydration-ADULT  Goal: Nutrient/Hydration intake appropriate for improving, restoring or maintaining nutritional needs  Description: Monitor and assess patient's nutrition/hydration status for malnutrition  Collaborate with interdisciplinary team and initiate plan and interventions as ordered  Monitor patient's weight and dietary intake as ordered or per policy  Utilize nutrition screening tool and intervene as necessary  Determine patient's food preferences and provide high-protein, high-caloric foods as appropriate       INTERVENTIONS:  - Monitor oral intake, urinary output, labs, and treatment plans  - Assess nutrition and hydration status and recommend course of action  - Evaluate amount of meals eaten  - Assist patient with eating if necessary   - Allow adequate time for meals  - Recommend/ encourage appropriate diets, oral nutritional supplements, and vitamin/mineral supplements  - Order, calculate, and assess calorie counts as needed  - Recommend, monitor, and adjust tube feedings and TPN/PPN based on assessed needs  - Assess need for intravenous fluids  - Provide specific nutrition/hydration education as appropriate  - Include patient/family/caregiver in decisions related to nutrition  Outcome: Progressing     Problem: SAFETY ADULT  Goal: Patient will remain free of falls  Description: INTERVENTIONS:  - Educate patient/family on patient safety including physical limitations  - Instruct patient to call for assistance with activity   - Consult OT/PT to assist with strengthening/mobility   - Keep Call bell within reach  - Keep bed low and locked with side rails adjusted as appropriate  - Keep care items and personal belongings within reach  - Initiate and maintain comfort rounds  - Make Fall Risk Sign visible to staff  - Offer Toileting every  Hours, in advance of need  - Initiate/Maintain alarm  - Obtain necessary fall risk management equipment:   - Apply yellow socks and bracelet for high fall risk patients  - Consider moving patient to room near nurses station  Outcome: Progressing  Goal: Maintain or return to baseline ADL function  Description: INTERVENTIONS:  -  Assess patient's ability to carry out ADLs; assess patient's baseline for ADL function and identify physical deficits which impact ability to perform ADLs (bathing, care of mouth/teeth, toileting, grooming, dressing, etc )  - Assess/evaluate cause of self-care deficits   - Assess range of motion  - Assess patient's mobility; develop plan if impaired  - Assess patient's need for assistive devices and provide as appropriate  - Encourage maximum independence but intervene and supervise when necessary  - Involve family in performance of ADLs  - Assess for home care needs following discharge   - Consider OT consult to assist with ADL evaluation and planning for discharge  - Provide patient education as appropriate  Outcome: Progressing  Goal: Maintains/Returns to pre admission functional level  Description: INTERVENTIONS:  - Perform BMAT or MOVE assessment daily    - Set and communicate daily mobility goal to care team and patient/family/caregiver     - Collaborate with rehabilitation services on mobility goals if consulted  - Perform Range of Motion  times a day  - Reposition patient every  hours  - Dangle patient  times a day  - Stand patient  times a day  - Ambulate patient  times a day  - Out of bed to chair  times a day   - Out of bed for meals times a day  - Out of bed for toileting  - Record patient progress and toleration of activity level   Outcome: Progressing     Problem: NEUROSENSORY - ADULT  Goal: Achieves stable or improved neurological status  Description: INTERVENTIONS  - Monitor and report changes in neurological status  - Monitor vital signs such as temperature, blood pressure, glucose, and any other labs ordered   - Initiate measures to prevent increased intracranial pressure  - Monitor for seizure activity and implement precautions if appropriate      Outcome: Progressing  Goal: Remains free of injury related to seizures activity  Description: INTERVENTIONS  - Maintain airway, patient safety  and administer oxygen as ordered  - Monitor patient for seizure activity, document and report duration and description of seizure to physician/advanced practitioner  - If seizure occurs,  ensure patient safety during seizure  - Reorient patient post seizure  - Seizure pads on all 4 side rails  - Instruct patient/family to notify RN of any seizure activity including if an aura is experienced  - Instruct patient/family to call for assistance with activity based on nursing assessment  - Administer anti-seizure medications if ordered    Outcome: Progressing  Goal: Achieves maximal functionality and self care  Description: INTERVENTIONS  - Monitor swallowing and airway patency with patient fatigue and changes in neurological status  - Encourage and assist patient to increase activity and self care     - Encourage visually impaired, hearing impaired and aphasic patients to use assistive/communication devices  Outcome: Progressing

## 2022-07-15 NOTE — ASSESSMENT & PLAN NOTE
Present on admission  PLAN  Hold bupropion, venlafaxine, citalopram escitalopram: We will defer to psych for management of these medications

## 2022-07-15 NOTE — DISCHARGE SUMMARY
[facility]  Discharge- Arcenio Can 1970, 49 y o  female MRN: [id number]  Unit/Bed#: S -01 Encounter: [id number]  Primary Care Provider: JHONATAN Hu   Date and time admitted to hospital: 7/13/2022  2:53 PM    * Suicidal deliberate poisoning [facility])  Assessment & Plan  Patient noted taking 14 pills of dapekote at 14:00 on 13JUL2022 as well as medical marajuana and methylphenidate (purchased on street) subsequently  called EMS and was brought to ED  Dr Jay Villalobos psychiatrist [facility] prescribes the dapekote for schizoaffective disorder bipolar unknown type  Patient has not eat today  Patient is tired, had chest pain which has subsided  Patient endorses H/A is tearful and initally was noncommunicative  SOCIAL: 5 years since last 2 packs70 pack year  No alcohol  Marcella Mary couple hits each night  Bathsalts was sliped the salts took unintentially  Meth amphetamines snort 2-3 most days from street  PLAN: Stabilize patient medically and form toxicology viewpoint  IF ANY QUESTIONS THAT CANT BE ANSWERED [facility] [phone]  Toxicology Recommendation:  1  Valproic Acid Level: [id number] level is 102 therapeutic high 100, patient is medically cleared to restart treatment inpatient 16JUL2022  We will defer to psychiatry on when and if to restart  2  Hold Venlafaxine, bupropion, citalopram, escitalopram  We will defer to psych for management of these medications  3  Hold Valproic Acid: We will defer to psych for management of this medication  GOALS: No laboratory or symptomatic evidence of drug OD  GOAL MEASUREMENTS:  Free of tachycardia 48hrs post OD  GOAL ATc<120   No CNS Depression sxs  No sxs of hepatotoxicity     Schizoaffective disorder, bipolar type [facility])  Assessment & Plan  Present on admission  This condition is a risk factor for SI, self harm, and attention seeking behavior    [address] [facility] [address]: Patient was placed on tele however when informed of inpatient psych plan they became very distraught, removed IV and Tele, was throwing objects, and crying  [address]: Patient mood has fluctuated throughout the inpatient course from tearful/remorseful to good mood to inconsolable which is consistent with the pathology  PLAN  Psych Consult got 201 for inpatient psych treatment  Patient is medically cleared for D/C to inpatient psych treatment  Major depressive disorder  Assessment & Plan  Present on admission  PLAN  Hold bupropion, venlafaxine, citalopram escitalopram: We will defer to psych for management of these medications  Mood disorder [facility])  Assessment & Plan  Present on admission   PLAN  We will defer to psych for management of this problem  Substance abuse [facility])  Assessment & Plan  Present on admission   PLAN  Please see psych consult note      Potential for cognitive impairment  Assessment & Plan  Present on admission   PLAN  Formal testing should be considered  COPD (chronic obstructive pulmonary disease) ([facility] [address] )  Assessment & Plan  Present on admission  Patient noted feeling no symptoms  Currently untreated  Patient has a 70 year pack history quit 5 years ago  Hypothyroidism  Assessment & Plan  Present on admission  PLAN  Continue home medications     Benign essential hypertension  Assessment & Plan  Present on admission   PLAN   Continue home medications      MAG (obstructive sleep apnea)  Assessment & Plan  Patient noted needing a CPAP if placed inpatient  PLAN  CPAP has been made available bedside to patient  Pt is cleared for D/C please ensure inpatient psyc will have a CPAP available       Overactive bladder  Assessment & Plan  Present on admission   PLAN   Continue home medications      Mixed hyperlipidemia  Assessment & Plan  Present on admission   PLAN   Continue home medications          Medical Problems             Resolved Problems  Date Reviewed: 7/15/2022   None               Discharging Resident: Ramos Russ MD  Discharging Attending: Johana Garcia MD  PCP: Norman Kaur, [address] [place] [address]  Admission Date:   Admission Orders (From admission, onward)     Ordered        07/14/22 1531  Inpatient Admission  Once            07/13/22 1807  Place in Observation  Once                      Discharge Date: 07/15/22    Consultations During Hospital Stay:  · Psychiatry   · Toxicology    Procedures Performed:   · N/A    Significant Findings / Test Results:   · Elevated Valproic Acid levels    Incidental Findings:   · Bradycardia/potential Heart Block First degree: Please follow up with cardiologist      Test Results Pending at Discharge (will require follow up):  · N/A     Outpatient Tests Requested:  · N/A    Complications:  N/A    Reason for Admission: Suicidal deliberate poisoning     Hospital Course:   Donalda Phoenix is a 49 y o  female patient who originally presented to the hospital on 7/13/2022 due to suicide attempt via Valproic Acid overdose  The patient has a complex psychiatric PMH including multiple suicide attempts, self cutting behavior, Schizoaffective disorder, bipolar type, mood disorder, major depressive disorder, polysubstance abuse disorder, and potentially cognitive impairment who was brought to the ED by EMS  Patient admitted to multiple suicide attempts including risperidone OD admission 10 years ago, a self afficiation with a hospital bandage while in a hospital for bath salt OD, and "many others"  Patient admitted to have started cutting when they were 15 after the death of their grandmother  Patient stated they took 14 pills of Depakote, medical mariajuana, and street methylphenidate at 14:00 on 13JUL2022 when they called EMS  Dr Pushpa Smith psychiatrist Veena Mcghee prescribes the dapekote for schizoaffective disorder bipolar type  Patient said this attempt was triggerd by missing their mother who passed away earlier this year and a father that is no longer involved    Patient has been feeling alone and depressed  Pt also noted frustration w/ ACT case marianna that is leaving, (they are tired of different ), and hospitals not taking their issues seriously (stiches from previous injury left in too long scared hand wound will result in same)  Patient endorsed hateing their life and wanting to end it   Patient has not eat today, is tired, had chest pain which has subsided  Patient endorses H/A is tearful and initally was noncommunicative  Patient was admitted to the care of [facility] team, Toxicology was consulted as was psychiatry  Patient was placed on one on one observation, tele, and [facility] team followed protocol provided by toxicology team to care for a patient with valproic acid overdose  [id number] psychiatry saw the patient and informed them of the decision to be placed into inpatient psychiatric treatment  Patient eventually signed a 201 for voluntary admition  Patient had multiple mood swing where they would remove IVs and tele pads which is not inconsistent with their pathology  Patient was noted to have an asymptomatic first degree heart block which should be followed outpatient  After 48hrs in the care of [facility] team the patient was medically cleared by SLIM for D/C      Please see above list of diagnoses and related plan for additional information  Condition at Discharge: stable    Discharge Day Visit / Exam:   Subjective:  Patient was seen today and no acute changes were noted  Vitals: Blood Pressure: 118/71 (07/15/22 0810)  Pulse: 72 (07/14/22 2057)  Temperature: 98 °F (36 7 °C) (07/14/22 2057)  Temp Source: Oral (07/14/22 2057)  Respirations: 18 (07/15/22 0810)  Height: 5' 6" (167 6 cm) (07/14/22 1418)  SpO2: 94 % (07/14/22 1645)  Exam:   Physical Exam  Vitals and nursing note reviewed  Constitutional:       Appearance: She is well-developed  She is obese  She is not ill-appearing or toxic-appearing  HENT:      Head: Normocephalic        Nose: Nose normal  Mouth/Throat:      Mouth: Mucous membranes are moist    Eyes:      General: No scleral icterus  Right eye: No discharge  Left eye: No discharge  Conjunctiva/sclera: Conjunctivae normal    Cardiovascular:      Rate and Rhythm: Normal rate and regular rhythm  Pulmonary:      Effort: Pulmonary effort is normal  No respiratory distress  Breath sounds: Normal breath sounds  Abdominal:      Palpations: Abdomen is soft  Tenderness: There is no abdominal tenderness  There is no guarding or rebound  Musculoskeletal:      Cervical back: Neck supple  Skin:     General: Skin is warm and dry  Coloration: Skin is not jaundiced  Neurological:      Mental Status: She is alert  Mental status is at baseline  Patient had multiple mood swings however there was no evidence of psychosis, geovany, or delerium  No signs of encephalopathy or hepatotoxicity  Discussion with Family: Attempted to update  (friend) via phone  Left voicemail  Discharge instructions/Information to patient and family:   See after visit summary for information provided to patient and family  Provisions for Follow-Up Care:  See after visit summary for information related to follow-up care and any pertinent home health orders  Disposition:   Inpatient Psychiatry at per case management    Planned Readmission:     Discharge Medications:  See after visit summary for reconciled discharge medications provided to patient and/or family        **Please Note: This note may have been constructed using a voice recognition system**

## 2022-07-15 NOTE — PLAN OF CARE
Problem: Potential for Falls  Goal: Patient will remain free of falls  Description: INTERVENTIONS:  - Educate patient/family on patient safety including physical limitations  - Instruct patient to call for assistance with activity   - Consult OT/PT to assist with strengthening/mobility   - Keep Call bell within reach  - Keep bed low and locked with side rails adjusted as appropriate  - Keep care items and personal belongings within reach  - Initiate and maintain comfort rounds  - Make Fall Risk Sign visible to staff  - Offer Toileting every  Hours, in advance of need  - Initiate/Maintain alarm  - Obtain necessary fall risk management equipment:   - Apply yellow socks and bracelet for high fall risk patients  - Consider moving patient to room near nurses station  Outcome: Completed     Problem: Nutrition/Hydration-ADULT  Goal: Nutrient/Hydration intake appropriate for improving, restoring or maintaining nutritional needs  Description: Monitor and assess patient's nutrition/hydration status for malnutrition  Collaborate with interdisciplinary team and initiate plan and interventions as ordered  Monitor patient's weight and dietary intake as ordered or per policy  Utilize nutrition screening tool and intervene as necessary  Determine patient's food preferences and provide high-protein, high-caloric foods as appropriate       INTERVENTIONS:  - Monitor oral intake, urinary output, labs, and treatment plans  - Assess nutrition and hydration status and recommend course of action  - Evaluate amount of meals eaten  - Assist patient with eating if necessary   - Allow adequate time for meals  - Recommend/ encourage appropriate diets, oral nutritional supplements, and vitamin/mineral supplements  - Order, calculate, and assess calorie counts as needed  - Recommend, monitor, and adjust tube feedings and TPN/PPN based on assessed needs  - Assess need for intravenous fluids  - Provide specific nutrition/hydration education as appropriate  - Include patient/family/caregiver in decisions related to nutrition  Outcome: Completed     Problem: SAFETY ADULT  Goal: Patient will remain free of falls  Description: INTERVENTIONS:  - Educate patient/family on patient safety including physical limitations  - Instruct patient to call for assistance with activity   - Consult OT/PT to assist with strengthening/mobility   - Keep Call bell within reach  - Keep bed low and locked with side rails adjusted as appropriate  - Keep care items and personal belongings within reach  - Initiate and maintain comfort rounds  - Make Fall Risk Sign visible to staff  - Offer Toileting every  Hours, in advance of need  - Initiate/Maintain alarm  - Obtain necessary fall risk management equipment:   - Apply yellow socks and bracelet for high fall risk patients  - Consider moving patient to room near nurses station  Outcome: Completed  Goal: Maintain or return to baseline ADL function  Description: INTERVENTIONS:  -  Assess patient's ability to carry out ADLs; assess patient's baseline for ADL function and identify physical deficits which impact ability to perform ADLs (bathing, care of mouth/teeth, toileting, grooming, dressing, etc )  - Assess/evaluate cause of self-care deficits   - Assess range of motion  - Assess patient's mobility; develop plan if impaired  - Assess patient's need for assistive devices and provide as appropriate  - Encourage maximum independence but intervene and supervise when necessary  - Involve family in performance of ADLs  - Assess for home care needs following discharge   - Consider OT consult to assist with ADL evaluation and planning for discharge  - Provide patient education as appropriate  Outcome: Completed  Goal: Maintains/Returns to pre admission functional level  Description: INTERVENTIONS:  - Perform BMAT or MOVE assessment daily    - Set and communicate daily mobility goal to care team and patient/family/caregiver     - Collaborate with rehabilitation services on mobility goals if consulted  - Perform Range of Motion  times a day  - Reposition patient every  hours  - Dangle patient  times a day  - Stand patient  times a day  - Ambulate patient  times a day  - Out of bed to chair  times a day   - Out of bed for meals times a day  - Out of bed for toileting  - Record patient progress and toleration of activity level   Outcome: Completed     Problem: NEUROSENSORY - ADULT  Goal: Achieves stable or improved neurological status  Description: INTERVENTIONS  - Monitor and report changes in neurological status  - Monitor vital signs such as temperature, blood pressure, glucose, and any other labs ordered   - Initiate measures to prevent increased intracranial pressure  - Monitor for seizure activity and implement precautions if appropriate      Outcome: Completed  Goal: Remains free of injury related to seizures activity  Description: INTERVENTIONS  - Maintain airway, patient safety  and administer oxygen as ordered  - Monitor patient for seizure activity, document and report duration and description of seizure to physician/advanced practitioner  - If seizure occurs,  ensure patient safety during seizure  - Reorient patient post seizure  - Seizure pads on all 4 side rails  - Instruct patient/family to notify RN of any seizure activity including if an aura is experienced  - Instruct patient/family to call for assistance with activity based on nursing assessment  - Administer anti-seizure medications if ordered    Outcome: Completed  Goal: Achieves maximal functionality and self care  Description: INTERVENTIONS  - Monitor swallowing and airway patency with patient fatigue and changes in neurological status  - Encourage and assist patient to increase activity and self care     - Encourage visually impaired, hearing impaired and aphasic patients to use assistive/communication devices  Outcome: Completed     Problem: METABOLIC, FLUID AND ELECTROLYTES - ADULT  Goal: Electrolytes maintained within normal limits  Description: INTERVENTIONS:  - Monitor labs and assess patient for signs and symptoms of electrolyte imbalances  - Administer electrolyte replacement as ordered  - Monitor response to electrolyte replacements, including repeat lab results as appropriate  - Instruct patient on fluid and nutrition as appropriate  Outcome: Completed  Goal: Fluid balance maintained  Description: INTERVENTIONS:  - Monitor labs   - Monitor I/O and WT  - Instruct patient on fluid and nutrition as appropriate  - Assess for signs & symptoms of volume excess or deficit  Outcome: Completed  Goal: Glucose maintained within target range  Description: INTERVENTIONS:  - Monitor Blood Glucose as ordered  - Assess for signs and symptoms of hyperglycemia and hypoglycemia  - Administer ordered medications to maintain glucose within target range  - Assess nutritional intake and initiate nutrition service referral as needed  Outcome: Completed

## 2022-07-15 NOTE — CASE MANAGEMENT
Case Management Progress Note    Patient name Lowell Le  Location S /S -01 MRN [id number]  : 1970 Date 7/15/2022       LOS (days): 1  Geometric Mean LOS (GMLOS) (days): 2 30  Days to GMLOS:1 5        OBJECTIVE:        Current admission status: Inpatient  Preferred Pharmacy:   [address], [address]  [address] [place] [address]  Phone: [phone] Fax: [phone]    Primary Care Provider: JHONATAN Terry    Primary Insurance: MEDICARE  Secondary Insurance: [address] [place] [address]    PROGRESS NOTE:    CM made phone calls to inquire on female beds at the following facilities but no beds at this time:    [facility]  [facility] @ [phone]  2  [facility] - [phone]  3  [facility] - [phone]  6   [address] [facility]- [phone]

## 2022-07-15 NOTE — PROGRESS NOTES
Patient agitated and crying, not answering questions for multiple hours  [facility] aware  Patient got out of bed, heads towards window and grabs window curtain cord with strong , attempting to get cord around herself  Attempted to reorient and guide patient back to bed  SLIM aware  Patient guided/carried back into bed, calmed down and dicussed plan of care again  Patient making phonecall  1:1 observation continued

## 2022-07-15 NOTE — CASE MANAGEMENT
Case Management Discharge Planning Note    Patient name Ulises KIM /S -01 MRN [id number]  : 1970 Date 7/15/2022       Current Admission Date: 2022  Current Admission Diagnosis:Suicidal deliberate poisoning [facility])   Patient Active Problem List    Diagnosis Date Noted    Suicidal deliberate poisoning ([place] [address] ) 2022    Knee pain, right 2022    Platelets decreased ([place] [address] ) 2022    GERD (gastroesophageal reflux disease) 2022    Diarrhea 2022    Ecchymosis 2022    Anxiety 2022    Borderline personality disorder ([place] [address] ) 2022    Contusion of elbow 2021    Cognitive impairment 10/07/2021    Substance abuse ([place] [address] ) 2021    Potential for cognitive impairment 2021    Mood disorder ([place] [address] ) 2021    Chronic tension-type headache, intractable 03/10/2021    Morbid obesity with BMI of 40 0-44 9, adult ([place] [address] ) 2021    History of COVID-19 2020    Memory difficulties 2020    Class 3 severe obesity due to excess calories with serious comorbidity and body mass index (BMI) of 40 0 to 44 9 in adult [facility]) 2020    Mixed hyperlipidemia 10/30/2019    COPD (chronic obstructive pulmonary disease) ([place] [address] ) 2019    Major depressive disorder 2019    Sjogren's syndrome ([place] [address] ) 2019    Marijuana abuse 2019    Primary osteoarthritis of both knees 2018    MAG (obstructive sleep apnea) 2018    Medial epicondylitis of left elbow 2018    Hemorrhage of rectum and anus 10/02/2017    Overactive bladder 2017    Schizoaffective disorder, bipolar type ([place] [address] ) 2017    Hypothyroidism 2017    Restrictive lung disease 2017    Family history of renal cancer 2016    Microhematuria 2015    Yan's esophagus determined by biopsy 10/19/2015    DDD (degenerative disc disease), lumbar 2015    Spondylosis of lumbosacral joint without myelopathy 04/09/2015    Benign essential hypertension 07/02/2014    Edema 03/26/2014    Chronic low back pain 04/07/2012      LOS (days): 1  Geometric Mean LOS (GMLOS) (days): 2 30  Days to GMLOS:1 2     OBJECTIVE:  Risk of Unplanned Readmission Score: 24 23         Current admission status: Inpatient   Preferred Pharmacy:   [address], [address]  [address] [facility] [address]  Phone: [phone] Fax: [phone]    Primary Care Provider: JHONATAN Melissa    Primary Insurance: MEDICARE  Secondary Insurance: [address] [place] [address]    DISCHARGE DETAILS:    Discharge planning discussed with[de-identified] Patient  Freedom of Choice: Yes  Comments - Freedom of Choice: CM informed patient she was accepted to [facility] and CM working on transport  CM informed patient she can use her CPAP at [address] [place] [address] as she was concerned if she can bring it with her  CM contacted family/caregiver?: No- see comments  Were Treatment Team discharge recommendations reviewed with patient/caregiver?: Yes  Did patient/caregiver verbalize understanding of patient care needs?: N/A- going to facility  Were patient/caregiver advised of the risks associated with not following Treatment Team discharge recommendations?: Yes    Contacts  Patient Contacts: Patient  Relationship to Patient[de-identified] Other (Comment) (Self)  Contact Method: In Person  Reason/Outcome: Discharge Planning              Other Referral/Resources/Interventions Provided:  Interventions: Inpatient Behavioral Health  Referral Comments: CM informed patient she was accepted to [facility] and CM working on transport  CM informed patient she can use her CPAP at [address] [facility] as she was concerned if she can bring it with her           Treatment Team Recommendation: Inpatient Behavioral Health  Discharge Destination Plan[de-identified] Inpatient Behavioral Health  Transport at Discharge : [facility] Transfer  Dispatcher Contacted: Yes  Number/Name of DispatcherRonalee Elaine - [phone]  Transported by [employer] and Unit #): [facility]  ETA of Transport (Date): 07/15/22  ETA of Transport (Time): 1830     Transfer Mode: Stretcher  Accompanied by: EMS personnel                 Accepting Facility Name, [facility] : [address]           CM was informed by Machelle at [facility] that [address], not [facility] will  patient at 1830 today  [address] - Fax# [phone] and Report# [phone]

## 2022-07-15 NOTE — ASSESSMENT & PLAN NOTE
Patient noted needing a CPAP if placed inpatient  PLAN  CPAP has been made available bedside to patient  Pt is cleared for D/C please ensure inpatient psyc will have a CPAP available

## 2022-07-15 NOTE — LETTER
[facility]  [facility] [address]  [phone]  Dept: [phone]    July 26, 2022     Patient: Zafar Cunningham   YOB: 1970   Date of Visit: 7/15/2022       To Whom it May Concern:    Alisa Fletcher is under my professional care  She was seen in the hospital from 7/15/2022   to 07/26/22  She may return to work on 8/1/22 without limitations  If you have any questions or concerns, please don't hesitate to call           Sincerely,          Pk Guillory, DO

## 2022-07-15 NOTE — DISCHARGE INSTR - AVS FIRST PAGE
Dear Arcenio North,     It was our pleasure to care for you here at [facility]  It is our hope that we were always able to exceed the expected standards for your care during your stay  You were hospitalized due to ***  You were cared for on the *** floor by Angelita Oviedo MD under the service of Evi Arroyo MD with the [facility] who covers for your primary care physician (PCP), Anastacio Or, while you were hospitalized  If you have any questions or concerns related to this hospitalization, you may contact us at [phone]  For follow up as well as any medication refills, we recommend that you follow up with your primary care physician  A registered nurse will reach out to you by phone within a few days after your discharge to answer any additional questions that you may have after going home  However, at this time we provide for you here, the most important instructions / recommendations at discharge:     Notable Medication Adjustments -   Several medications were stopped due to potential adverse effects, please refer to your psychiatrist with any questions  Testing Required after Discharge -   N/A  Important follow up information -   N/A  Other Instructions -   Please get well, thank you for allowing me to be part of your treatment team  It was a pleasure getting to know you  Thank you for sharing and being honest  I really hope the best for you and your cat Sanjiv    Please review this entire after visit summary as additional general instructions including medication list, appointments, activity, diet, any pertinent wound care, and other additional recommendations from your care team that may be provided for you        Sincerely,     Angelita Oviedo MD

## 2022-07-15 NOTE — ASSESSMENT & PLAN NOTE
Patient noted taking 14 pills of dapekote at 14:00 on 13JUL2022 as well as medical marajuana and methylphenidate (purchased on street) subsequently  called EMS and was brought to ED  Dr Jay Villalobos psychiatrist [facility] prescribes the dapekote for schizoaffective disorder bipolar unknown type  Patient has not eat today  Patient is tired, had chest pain which has subsided  Patient endorses H/A is tearful and initally was noncommunicative  SOCIAL: 5 years since last 2 packs70 pack year  No alcohol  Marcella Mary couple hits each night  Bathsalts was sliped the salts took unintentially  Meth amphetamines snort 2-3 most days from street  PLAN: Stabilize patient medically and form toxicology viewpoint  IF ANY QUESTIONS THAT CANT BE ANSWERED [facility] [place] [phone]  Toxicology Recommendation:  1  Valproic Acid Level: [id number] level is 102 therapeutic high 100, patient is medically cleared to restart treatment inpatient 16JUL2022  We will defer to psychiatry on when and if to restart  2  Hold Venlafaxine, bupropion, citalopram, escitalopram  We will defer to psych for management of these medications  3  Hold Valproic Acid: We will defer to psych for management of this medication       GOALS: No laboratory or symptomatic evidence of drug OD  GOAL MEASUREMENTS:  Free of tachycardia 48hrs post OD  GOAL ATc<120   No CNS Depression sxs  No sxs of hepatotoxicity

## 2022-07-15 NOTE — PROGRESS NOTES
[facility]  Progress Note - Cj Younger 1970, 49 y o  female MRN: [id number]  Unit/Bed#: S -01 Encounter: [id number]  Primary Care Provider: JHONATAN Glaser   Date and time admitted to hospital: 7/13/2022  2:53 PM    * Suicidal deliberate poisoning [facility])  Assessment & Plan  Patient noted taking 14 pills of dapekote at 14:00 on 13JUL2022 as well as medical marajuana and methylphenidate (purchased on street) subsequently  called EMS and was brought to ED  Dr Brett Stewart psychiatrist [facility] prescribes the dapekote for schizoaffective disorder bipolar unknown type  Patient has not eat today  Patient is tired, had chest pain which has subsided  Patient endorses H/A is tearful and initally was noncommunicative  SOCIAL: 5 years since last 2 packs70 pack year  No alcohol  Gaffney Beery couple hits each night  Bathsalts was sliped the salts took unintentially  Meth amphetamines snort 2-3 most days from street  PLAN: Stabilize patient medically and form toxicology viewpoint  IF ANY QUESTIONS THAT CANT BE ANSWERED [facility] [place] [phone]  Toxicology Recommendation:  1  Valproic Acid Level: [id number] level is 102 therapeutic high 100, patient is medically cleared to restart treatment inpatient [id number]  We will defer to psychiatry on when and if to restart  2  Seizure precautions for 24hrs since ingestion: Patient has passed 24hr [place] seizure precautions were removed  3  IF patient becomes tachycardia Serial EKG monitoring for QRS widening: Patient is on tele, there have been period episodes of bradycardia but no tachycardia  Continue tele until D/C   4  Hold Venlafaxine, bupropion, citalopram, escitalopram  We will defer to psych for management of these medications  5  Hold Valproic Acid: We will defer to psych for management of this medication  6  VA known to cause encephalopathy and hepatotoxicity: Patient has shown no signs/sxs of hepatotoxicity   No need to continue to trend liver enzymes  One on one observation including monitoring for CNS depression and suicidal ideation  GOALS: No laboratory or symptomatic evidence of drug OD  GOAL MEASUREMENTS:  Free of tachycardia 48hrs post OD  GOAL ATc<120   No CNS Depression sxs  No sxs of hepatotoxicity     Schizoaffective disorder, bipolar type [facility])  Assessment & Plan  Present on admission  This condition is a risk factor for SI, self harm, and attention seeking behavior  Celia 5156: Patient was placed on tele however when informed of inpatient psych plan they became very distraught, removed IV and Tele, was throwing objects, and crying  Celia 5156: Patient mood has fluctuated throughout the inpatient course from tearful/remorseful to good mood to inconsolable which is consistent with the pathology  PLAN  Psych Consult got 201 for inpatient psych treatment  Patient is medically cleared for D/C to inpatient psych treatment  Major depressive disorder  Assessment & Plan  Present on admission  PLAN  Hold bupropion, venlafaxine, citalopram escitalopram: We will defer to psych for management of these medications  Mood disorder [facility])  Assessment & Plan  Present on admission   PLAN  We will defer to psych for management of this problem  Substance abuse [facility])  Assessment & Plan  Present on admission   PLAN  Please see psych consult note      Potential for cognitive impairment  Assessment & Plan  Present on admission   PLAN  Formal testing should be considered  COPD (chronic obstructive pulmonary disease) ([facility] [address] )  Assessment & Plan  Present on admission  Patient noted feeling no symptoms  Currently untreated  Patient has a 70 year pack history quit 5 years ago  PLAN  No longer need to closely monitor SpO2 and RR (Valproic acid OD can suppress respiratory drive for 24 hrs post ingestion (14:00 [id number]) )   Valproic acid levels have decreased to near theraputic levels (102, therapeutic max 100) and 24hr observation threshold has passed  Hypothyroidism  Assessment & Plan  Present on admission  PLAN  Continue home medications     Benign essential hypertension  Assessment & Plan  Present on admission   PLAN   Continue home medications      MAG (obstructive sleep apnea)  Assessment & Plan  Patient noted needing a CPAP if placed inpatient  PLAN  CPAP has been made available bedside to patient  Pt is cleared for D/C please ensure inpatient psyc will have a CPAP available  Overactive bladder  Assessment & Plan  Present on admission   PLAN   Continue home medications      Mixed hyperlipidemia  Assessment & Plan  Present on admission   PLAN   Continue home medications          VTE Pharmacologic Prophylaxis: VTE Score: 7 High Risk (Score >/= 5) - Pharmacological DVT Prophylaxis Ordered: enoxaparin (Lovenox)  Sequential Compression Devices Ordered  Patient Centered Rounds: I performed bedside rounds with nursing staff today  Discussions with Specialists or Other Care Team Provider: Case management has medical clearance from [facility] for patient D/C to inpatient psychiatric treatment  Education and Discussions with Family / Patient: Attempted to update  (friend) via phone  Left voicemail  Current Length of Stay: 1 day(s)  Current Patient Status: Inpatient   Discharge Plan: Anticipate discharge in 24-48 hrs to inpatient psych  Code Status: Level 1 - Full Code    Subjective:   Patient was seen in the morning and was apologetic for their actions the prior day  Patient was consoled and reminded it was part of their pathology  Patient noted to having diarrhea and is waiting for their father to bring their normal anti diarrheal that [address] does not carry  This was cleared researched and found to have no potential interactions with their current meds or conditions by [facility]IM      Objective:     Vitals:   Temp (24hrs), Av °F (36 7 °C), Min:98 °F (36 7 °C), Max:98 °F (36 7 °C)    Temp:  [98 °F (36 7 °C)] 98 °F (36 7 °C)  HR:  [59-72] 72  Resp:  [16-18] 18  BP: (112-125)/(60-71) 118/71  SpO2:  [94 %] 94 %  Body mass index is 45 9 kg/m²  Input and Output Summary (last 24 hours): Intake/Output Summary (Last 24 hours) at 7/15/2022 1155  Last data filed at 7/14/2022 1441  Gross per 24 hour   Intake 60 ml   Output --   Net 60 ml       Physical Exam:   Physical Exam  Constitutional:       General: She is not in acute distress  Appearance: She is obese  She is not ill-appearing, toxic-appearing or diaphoretic  HENT:      Head: Normocephalic  Nose: Nose normal       Mouth/Throat:      Mouth: Mucous membranes are moist       Pharynx: No oropharyngeal exudate or posterior oropharyngeal erythema  Eyes:      General: No scleral icterus  Right eye: No discharge  Left eye: No discharge  Conjunctiva/sclera: Conjunctivae normal    Cardiovascular:      Rate and Rhythm: Normal rate  Pulses: Normal pulses  Pulmonary:      Effort: Pulmonary effort is normal    Abdominal:      Tenderness: There is no abdominal tenderness  There is no guarding or rebound  Skin:     Coloration: Skin is not jaundiced  Neurological:      Mental Status: Mental status is at baseline  Patient continues to have drastic mood swings but not psychotic nor manic symptoms were noted       Additional Data:     Labs:  Results from last 7 days   Lab Units 07/15/22  0443   WBC Thousand/uL 8 55   HEMOGLOBIN g/dL 14 9   HEMATOCRIT % 46 0   PLATELETS Thousands/uL 194   NEUTROS PCT % 58   LYMPHS PCT % 29   MONOS PCT % 11   EOS PCT % 1     Results from last 7 days   Lab Units 07/15/22  0443   SODIUM mmol/L 139   POTASSIUM mmol/L 3 7   CHLORIDE mmol/L 109*   CO2 mmol/L 23   BUN mg/dL 10   CREATININE mg/dL 0 62   ANION GAP mmol/L 7   CALCIUM mg/dL 8 7   ALBUMIN g/dL 3 5   TOTAL BILIRUBIN mg/dL 0 41   ALK PHOS U/L 56   ALT U/L 18   AST U/L 13   GLUCOSE RANDOM mg/dL 96         Results from last 7 days   Lab Units 22  0027   POC GLUCOSE mg/dl 82               Lines/Drains:  Invasive Devices  Report    None                   Telemetry:  Telemetry Orders (From admission, onward)             24 Hour Telemetry Monitoring  Continuous x 24 Hours (Telem)           References:    Telemetry Guidelines   Question:  Reason for 24 Hour Telemetry  Answer:  Metabolic/Electrolyte Disturbance with High Probability of Dysrhythmia (K level <3 or >6, or KCL infusion >=10mEq/hr)                 Telemetry Reviewed: Sinus Bradycardia  Indication for Continued Telemetry Use: Drug overdose known to cause cardiac arrhthymias             Imaging: No pertinent imaging reviewed      Recent Cultures (last 7 days):         Last 24 Hours Medication List:   Current Facility-Administered Medications   Medication Dose Route Frequency Provider Last Rate    acetaminophen  650 mg Oral Q6H PRN Cain Adan MD      albuterol  2 puff Inhalation Q6H PRN Cain Adan MD      amLODIPine  5 mg Oral Daily Cain Adan MD      cholecalciferol  5,000 Units Oral Daily Cain Adan MD      cholestyramine sugar free  4 g Oral BID Connell Heimlich, MD      enoxaparin  40 mg Subcutaneous Daily Cain Adan MD      estradiol  0 5 mg Oral Daily Cain Adan MD      fluticasone  2 puff Inhalation BID Cain Adan MD      furosemide  20 mg Oral Daily Cain Adan MD      ketotifen  1 drop Both Eyes BID Cain Adan MD      levothyroxine  100 mcg Oral Early Morning Cain Adan MD      loperamide  2 mg Oral TID PRN Connell Heimlich, MD      loratadine  10 mg Oral Daily Cain Adan MD      losartan  50 mg Oral Daily Cain Adan MD      lurasidone  20 mg Oral Daily With Breakfast Cain Adan MD      lurasidone  60 mg Oral Daily With Meneses Soup Misty Coreas MD      melatonin  3 mg Oral HS PRN Ron Saini MD      metoprolol tartrate  25 mg Oral Q12H [address] Marilyn Luis MD      ondansetron  4 mg Intravenous Q6H PRN Marilyn Luis MD      oxybutynin  5 mg Oral Daily Marilyn Luis MD      pantoprazole  40 mg Oral BID AC Ron Siani MD      pilocarpine  5 mg Oral TID Marilyn Luis MD      rOPINIRole  0 5 mg Oral HS Marilyn Luis MD      topiramate  100 mg Oral BID Marilyn Luis MD          Today, Patient Was Seen By: Marilyn Luis MD    **Please Note: This note may have been constructed using a voice recognition system  **

## 2022-07-16 PROCEDURE — 94760 N-INVAS EAR/PLS OXIMETRY 1: CPT

## 2022-07-16 PROCEDURE — 99223 1ST HOSP IP/OBS HIGH 75: CPT | Performed by: PHYSICIAN ASSISTANT

## 2022-07-16 PROCEDURE — 99223 1ST HOSP IP/OBS HIGH 75: CPT | Performed by: STUDENT IN AN ORGANIZED HEALTH CARE EDUCATION/TRAINING PROGRAM

## 2022-07-16 PROCEDURE — 94660 CPAP INITIATION&MGMT: CPT

## 2022-07-16 RX ORDER — MONTELUKAST SODIUM 4 MG/1
1 TABLET, CHEWABLE ORAL 2 TIMES DAILY
Status: DISCONTINUED | OUTPATIENT
Start: 2022-07-16 | End: 2022-07-26 | Stop reason: HOSPADM

## 2022-07-16 RX ORDER — BUPROPION HYDROCHLORIDE 150 MG/1
150 TABLET ORAL DAILY
Status: DISCONTINUED | OUTPATIENT
Start: 2022-07-16 | End: 2022-07-26 | Stop reason: HOSPADM

## 2022-07-16 RX ORDER — FLUTICASONE PROPIONATE 110 UG/1
2 AEROSOL, METERED RESPIRATORY (INHALATION) 2 TIMES DAILY
Status: DISCONTINUED | OUTPATIENT
Start: 2022-07-16 | End: 2022-07-26 | Stop reason: HOSPADM

## 2022-07-16 RX ADMIN — LEVOTHYROXINE SODIUM 100 MCG: 100 TABLET ORAL at 10:19

## 2022-07-16 RX ADMIN — PILOCARPINE HYDRCHLORIDE 5 MG: 5 TABLET, FILM COATED ORAL at 21:40

## 2022-07-16 RX ADMIN — PANTOPRAZOLE SODIUM 40 MG: 40 TABLET, DELAYED RELEASE ORAL at 10:19

## 2022-07-16 RX ADMIN — METOPROLOL TARTRATE 25 MG: 25 TABLET, FILM COATED ORAL at 21:39

## 2022-07-16 RX ADMIN — Medication 5000 UNITS: at 10:20

## 2022-07-16 RX ADMIN — MONTELUKAST SODIUM 1 G: 4 TABLET, CHEWABLE ORAL at 13:04

## 2022-07-16 RX ADMIN — LORAZEPAM 2 MG: 2 INJECTION INTRAMUSCULAR; INTRAVENOUS at 14:32

## 2022-07-16 RX ADMIN — AMLODIPINE BESYLATE 5 MG: 5 TABLET ORAL at 10:27

## 2022-07-16 RX ADMIN — PILOCARPINE HYDRCHLORIDE 5 MG: 5 TABLET, FILM COATED ORAL at 14:04

## 2022-07-16 RX ADMIN — LORATADINE 10 MG: 10 TABLET ORAL at 10:21

## 2022-07-16 RX ADMIN — TOPIRAMATE 100 MG: 100 TABLET, FILM COATED ORAL at 10:19

## 2022-07-16 RX ADMIN — OXYBUTYNIN CHLORIDE 5 MG: 5 TABLET, EXTENDED RELEASE ORAL at 10:22

## 2022-07-16 RX ADMIN — BUPROPION HYDROCHLORIDE 150 MG: 150 TABLET, FILM COATED, EXTENDED RELEASE ORAL at 12:13

## 2022-07-16 RX ADMIN — LURASIDONE HYDROCHLORIDE 20 MG: 20 TABLET, FILM COATED ORAL at 10:20

## 2022-07-16 RX ADMIN — ROPINIROLE HYDROCHLORIDE 0.5 MG: 0.5 TABLET, FILM COATED ORAL at 21:40

## 2022-07-16 RX ADMIN — KETOTIFEN FUMARATE 1 DROP: 0.35 SOLUTION/ DROPS OPHTHALMIC at 09:13

## 2022-07-16 RX ADMIN — LOSARTAN POTASSIUM 50 MG: 50 TABLET, FILM COATED ORAL at 10:21

## 2022-07-16 RX ADMIN — BENZTROPINE MESYLATE 1 MG: 1 INJECTION INTRAMUSCULAR; INTRAVENOUS at 14:33

## 2022-07-16 RX ADMIN — METOPROLOL TARTRATE 25 MG: 25 TABLET, FILM COATED ORAL at 10:26

## 2022-07-16 RX ADMIN — ESTRADIOL 0.5 MG: 1 TABLET ORAL at 10:22

## 2022-07-16 RX ADMIN — MONTELUKAST SODIUM 1 G: 4 TABLET, CHEWABLE ORAL at 21:40

## 2022-07-16 RX ADMIN — HALOPERIDOL LACTATE 5 MG: 5 INJECTION, SOLUTION INTRAMUSCULAR at 14:28

## 2022-07-16 RX ADMIN — FUROSEMIDE 20 MG: 20 TABLET ORAL at 10:19

## 2022-07-16 NOTE — PROGRESS NOTES
Per [facility] auto substitution protocol, Give Ellipta 100 mcg inhaler one puff daily instead of Flovent 110 mcg 2 puffs BID

## 2022-07-16 NOTE — H&P
Psychiatric Evaluation - Behavioral Health     Identification Data:Blanca Manning 49 y o  female MRN: [id number]  Unit/Bed#: [facility] 344-01 Encounter: [id number]    Chief Complaint:     History of present illness:    Fei Quintanilla is a 49 y o   female, domiciled alone with her cat  w/ PMH of multiple medical conditions including obesity, HTN, HLD, hypothyroidism, MAG (using CPAP), OAB, GERD, DDD,  Chronic LBP, chronic tension headache, and PPH of polysubstance use disorder (cannabis, methamphetamine), Schizoaffective disorder, anxiety, Borderline Personality Disorder, multiple prior psychiatric admissions, h/o prior SA, h/o self-injurious behavior, being followed by  [facility] ([phone]) and psychiatrist Dr Radha Lai, on Atarax 25 mg PRN, Wellbutron  mg daily, Effexor 75 mg daily, Latuda 20/60mg, Topamax 150 mg BID, Depakote 1000 mg nightly prior to admission who presented to [facility] ED due to SA via Depakote OD (reportedly 14 pills) after taking street meth and medical marijuana  Reportedly, as per ED note, the OD was triggered by missing her mother who passed away earlier this year and being estranged from her father as well as [facility]  who was reportedly leaving  The patient was admitted to the inpatient psychiatric unit at  (signed 201) after medical clearance  The pt was visited on the unit; chart reviewed  Presented calm, uncooperative, dressed in hospital attire, disheveled, lying on the bed, poor eye contact, depressed and dysphoric mood, constricted intense affect, tearful, talking in low tone and decreased amount, linear concrete TP, limited insight and judgement  She initially was not cooperative answering any questions, and later c/o back pain due ot lying flat on the regular bed, asking for hospital bed while using CPAP  She reported that she is "tired of living" and added "nobldy can help me"   She reported hopelessness, helplessness and more depressed for past "couple of week" stated: "everything went downhill"  She reported "so-so" compliance with her medication over past couple of weeks  Denied A/VH  She became tearful, ruminating "there is no way to help me", and then was not cooperative with the rest of psychiatric evaluation  She was tearful and terminated the interview while did not answer the safety questions and did not consent for safety  Hence, the patient remains on 1:1 for safety concerns and suicidality  The patient was restarted on Wellbutrin  mg (dose to be adjusted as indicated)l Maintained on same dose of Latuda and topamax  Effexor and Depakote were hold at this time         Psychiatric Review Of Systems:  Pertinent items are noted in HPI; all others negative    Historical Information     Past Psychiatric History:   - PPH of polysubstance use disorder (cannabis, methamphetamine), Schizoaffective disorder, anxiety, Borderline Personality Disorder, multiple prior psychiatric admissions, h/o prior SA, h/o self-injurious behavior, being followed by [facility] ([phone]) and psychiatrist Dr Jenell Mortimer, on Atarax 25 mg PRN, Wellbutron  mg daily, Effexor 75 mg daily, Latuda 20/60mg, Topamax 150 mg BID, Depakote 1000 mg nightly prior to admission   - Multiple medication trials    Substance Abuse History:  Social History     Substance and Sexual Activity   Alcohol Use Not Currently    Comment: Recovering alcoholic     Social History     Substance and Sexual Activity   Drug Use Yes    Types: Marijuana, Methamphetamines    Comment: medical- vapes 3 times per wk at hs         Family Psychiatric History:   Family History   Problem Relation Age of Onset    Kidney cancer Mother     Diabetes Mother     No Known Problems Father     No Known Problems Sister     No Known Problems Maternal Grandmother     No Known Problems Maternal Grandfather     No Known Problems Paternal Grandmother     No Known Problems Paternal Grandfather     Colon cancer Maternal Uncle     Colon cancer Maternal Uncle     Colon cancer Paternal Uncle     Colon cancer Family        Social History:  Social History     Socioeconomic History    Marital status: Single     Spouse name: Not on file    Number of children: Not on file    Years of education: Not on file    Highest education level: Not on file   Occupational History    Not on file   Tobacco Use    Smoking status: Former Smoker     Packs/day: 2 00     Years: 33 00     Pack years: 66 00     Types: Cigarettes     Start date: 5     Quit date: 2018     Years since quittin 5    Smokeless tobacco: Never Used    Tobacco comment: Started at age 14     Vaping Use    Vaping Use: Some days    Substances: THC   Substance and Sexual Activity    Alcohol use: Not Currently     Comment: Recovering alcoholic    Drug use: Yes     Types: Marijuana, Methamphetamines     Comment: medical- vapes 3 times per wk at hs    Sexual activity: Not Currently   Other Topics Concern    Not on file   Social History Narrative    Not on file     Social Determinants of Health     Financial Resource Strain: Not on file   Food Insecurity: Not on file   Transportation Needs: Not on file   Physical Activity: Not on file   Stress: Not on file   Social Connections: Not on file   Intimate Partner Violence: Not on file   Housing Stability: Not on file       Developmental:  Education: not cooperative  Marital history: not cooperative  Children: not cooperative  Living arrangement, social support: lives alone  Occupational History: unknown occupation  Access to firearms: not reported    Traumatic History:   Abuse: not cooperative  Other Traumatic Events: lost mother earlier this year as per chart review    Past Medical History:   Diagnosis Date    Anxiety     Arthritis     oa cassandra knees    Asthma     good control- no medications    Yan's esophagus     Bipolar affective disorder [facility] [address] [facility]     Chronic pain disorder     lumbar    CPAP (continuous positive airway pressure) dependence     Degenerative disc disease at L5-S1 level     Deliberate self-cutting     Depression 09/16/2008    Disease of thyroid gland     hypo    MARTINEZ (dyspnea on exertion)     Drug overdose 10/28/2008    suicide attempt    Dysphagia     Dyspnea     Edema     BLE    GERD (gastroesophageal reflux disease)     High blood pressure     History of COVID-19 12/30/2020    Symptoms started on 12/30/2020 and then got worse  Today she is feeling a little bit better    She is a candidate for monoclonal antibody infusion and set for 1/6/21 @ 1pm at [facility]  01/07/21 - telemedicine -     Hypertension     Knee pain, bilateral     Especially right    Migraines     Obese     Overactive bladder     Sjogren's disease ([address] )     Sleep apnea     Stress incontinence     Suicidal ideations     Umbilical hernia     surgical repair today 4/24/2019    Use of cane as ambulatory aid     awaiting b/l knee replacement    Wears glasses        Medical Review Of Systems:  Pertinent items are noted in HPI; all others negative    Meds/Allergies   all current active meds have been reviewed, current meds:   Current Facility-Administered Medications   Medication Dose Route Frequency    albuterol (PROVENTIL HFA,VENTOLIN HFA) inhaler 2 puff  2 puff Inhalation Q6H PRN    aluminum-magnesium hydroxide-simethicone (MYLANTA) oral suspension 30 mL  30 mL Oral Q4H PRN    amLODIPine (NORVASC) tablet 5 mg  5 mg Oral Daily    artificial tear (LUBRIFRESH P M ) ophthalmic ointment 1 application  1 application Both Eyes U8H PRN    haloperidol lactate (HALDOL) injection 2 5 mg  2 5 mg Intramuscular Q6H PRN Max 4/day    And    LORazepam (ATIVAN) injection 1 mg  1 mg Intramuscular Q6H PRN Max 4/day    And    benztropine (COGENTIN) injection 0 5 mg  0 5 mg Intramuscular Q6H PRN Max 4/day    haloperidol lactate (HALDOL) injection 5 mg  5 mg Intramuscular Q4H PRN Max 4/day    And    LORazepam (ATIVAN) injection 2 mg  2 mg Intramuscular Q4H PRN Max 4/day    And    benztropine (COGENTIN) injection 1 mg  1 mg Intramuscular Q4H PRN Max 4/day    benztropine (COGENTIN) injection 1 mg  1 mg Intramuscular Q4H PRN Max 6/day    benztropine (COGENTIN) tablet 1 mg  1 mg Oral Q4H PRN Max 6/day    buPROPion (WELLBUTRIN XL) 24 hr tablet 150 mg  150 mg Oral Daily    cholecalciferol (VITAMIN D3) tablet 5,000 Units  5,000 Units Oral Daily    colestipol (COLESTID) tablet 1 g  1 g Oral BID    estradiol (ESTRACE) tablet 0 5 mg  0 5 mg Oral Daily    fluticasone (ARNUITY ELLIPTA) 100 MCG/ACT inhaler 1 puff  1 puff Inhalation Daily    furosemide (LASIX) tablet 20 mg  20 mg Oral Daily    haloperidol (HALDOL) tablet 2 mg  2 mg Oral Q4H PRN Max 6/day    haloperidol (HALDOL) tablet 5 mg  5 mg Oral Q6H PRN Max 4/day    haloperidol (HALDOL) tablet 5 mg  5 mg Oral Q4H PRN Max 4/day    hydrOXYzine HCL (ATARAX) tablet 25 mg  25 mg Oral Q6H PRN Max 4/day    hydrOXYzine HCL (ATARAX) tablet 50 mg  50 mg Oral Q4H PRN Max 4/day    Or    LORazepam (ATIVAN) injection 1 mg  1 mg Intramuscular Q4H PRN    ibuprofen (MOTRIN) tablet 400 mg  400 mg Oral Q4H PRN    ibuprofen (MOTRIN) tablet 600 mg  600 mg Oral Q6H PRN    ibuprofen (MOTRIN) tablet 800 mg  800 mg Oral Q8H PRN    ketotifen (ZADITOR) 0 025 % ophthalmic solution 1 drop  1 drop Both Eyes BID    levothyroxine tablet 100 mcg  100 mcg Oral Early Morning    loperamide (IMODIUM) capsule 2 mg  2 mg Oral TID PRN    loratadine (CLARITIN) tablet 10 mg  10 mg Oral Daily    LORazepam (ATIVAN) tablet 1 mg  1 mg Oral Q4H PRN Max 6/day    Or    LORazepam (ATIVAN) injection 2 mg  2 mg Intramuscular Q6H PRN Max 3/day    losartan (COZAAR) tablet 50 mg  50 mg Oral Daily    lurasidone (LATUDA) tablet 20 mg  20 mg Oral Daily With Breakfast    lurasidone (LATUDA) tablet 60 mg  60 mg Oral Daily With Dinner    melatonin tablet 3 mg  3 mg Oral HS PRN    metoprolol tartrate (LOPRESSOR) tablet 25 mg  25 mg Oral Q12H [facility]    oxybutynin (DITROPAN-XL) 24 hr tablet 5 mg  5 mg Oral Daily    pantoprazole (PROTONIX) EC tablet 40 mg  40 mg Oral BID AC    pilocarpine (SALAGEN) tablet 5 mg  5 mg Oral TID    polyethylene glycol (MIRALAX) packet 17 g  17 g Oral Daily PRN    propranolol (INDERAL) tablet 10 mg  10 mg Oral Q8H PRN    rOPINIRole (REQUIP) tablet 0 5 mg  0 5 mg Oral HS    senna-docusate sodium (SENOKOT S) 8 6-50 mg per tablet 1 tablet  1 tablet Oral Daily PRN    topiramate (TOPAMAX) tablet 100 mg  100 mg Oral BID    and PTA meds:   Prior to Admission Medications   Prescriptions Last Dose Informant Patient Reported? Taking?    Cholecalciferol (Vitamin D3) 125 MCG (5000 UT) CAPS 7/15/2022 at Unknown time Self Yes Yes   Fesoterodine Fumarate ER (Toviaz) 4 MG TB24 7/15/2022 at Unknown time Self Yes Yes   Sig: Take 1 tablet by mouth daily   Latuda 20 MG tablet 7/15/2022 at Unknown time  Yes Yes   Si mg daily with breakfast   RABEprazole (ACIPHEX) 20 MG tablet 7/15/2022 at Unknown time Self Yes Yes   Sig: Take 20 mg by mouth 2 (two) times a day   albuterol (Ventolin HFA) 90 mcg/act inhaler More than a month at Unknown time  No No   Sig: Inhale 2 puffs every 6 (six) hours as needed for wheezing   amLODIPine (NORVASC) 5 mg tablet 7/15/2022 at Unknown time Self No Yes   Sig: TAKE 1 TABLET (5 MG TOTAL) BY MOUTH DAILY   aspirin (ECOTRIN LOW STRENGTH) 81 mg EC tablet Not Taking at Unknown time  Yes No   Sig: Take 81 mg by mouth daily   Patient not taking: Reported on 7/15/2022   cetirizine (ZyrTEC) 10 mg tablet Not Taking at Unknown time  Yes No   Patient not taking: Reported on 7/15/2022   colestipol (COLESTID) 1 g tablet 7/15/2022 at Unknown time  No Yes   Sig: Take 1 tablet (1 g total) by mouth 2 (two) times a day   estradiol (ESTRACE) 0 5 MG tablet 7/15/2022 at Unknown time  No Yes   Sig: TAKE 1 TABLET (0 5 MG TOTAL) BY MOUTH DAILY   fluticasone (FLOVENT HFA) 110 MCG/ACT inhaler More than a month at Unknown time Self Yes No   Sig: Inhale 2 puffs 2 (two) times a day Rinse mouth after use     furosemide (LASIX) 20 mg tablet 7/15/2022 at Unknown time  No Yes   Sig: TAKE 1 TABLET BY MOUTH DAILY   hydrOXYzine HCL (ATARAX) 25 mg tablet Unknown at Unknown time  Yes No   Sig: Take 25 mg by mouth every 6 (six) hours as needed for itching   levothyroxine 100 mcg tablet  Self No No   Sig: Take 1 tablet (100 mcg total) by mouth daily in the early morning   Patient taking differently: Take 75 mcg by mouth daily in the early morning   losartan (COZAAR) 50 mg tablet  Self No No   Sig: TAKE ONE TABLET BY MOUTH DAILY   lurasidone 60 MG TABS   No No   Sig: Take 1 tablet (60 mg total) by mouth daily with dinner   Patient taking differently: Take 60 mg by mouth daily with dinner   melatonin 3 mg Unknown at Unknown time  No No   Sig: Take 1 tablet (3 mg total) by mouth daily at bedtime as needed (as need for sleep)   metoprolol tartrate (LOPRESSOR) 25 mg tablet  Self No No   Sig: Take 1 tablet (25 mg total) by mouth every 12 (twelve) hours   olopatadine (PATANOL) 0 1 % ophthalmic solution Not Taking at Unknown time  Yes No   Patient not taking: Reported on 7/15/2022   pilocarpine (SALAGEN) 5 mg tablet 7/15/2022 at Unknown time Self Yes Yes   Sig: Take 5 mg by mouth 2 (two) times a day   rOPINIRole (REQUIP) 0 5 mg tablet 7/15/2022 at Unknown time Self Yes Yes   senna-docusate sodium (SENOKOT-S) 8 6-50 mg per tablet Not Taking at Unknown time  Yes No   Sig: Take 2 tablets by mouth daily   Patient not taking: Reported on 7/15/2022   topiramate (TOPAMAX) 100 mg tablet  Self No No   Sig: Take 1 tablet (100 mg total) by mouth 2 (two) times a day   Patient taking differently: Take 150 mg by mouth 2 (two) times a day      Facility-Administered Medications: None     Allergies   Allergen Reactions    Percocet [Oxycodone-Acetaminophen] Headache     Severe headaches   (denies issues with Tylenol)    Povidone Iodine Rash     Unsure if betadine or gauze post surgical  Got rash on leg  Has  Had itchy rashes after every surgery prep and IV insertion    Chlorhexidine Rash     Objective      Mental Status Evaluation:  Appearance and attitude: appeared as stated age, disheveled, dressed in hospital attire, overweight, with fair hygiene, lying on the bed tearful  Eye contact: poor  Motor Function: within normal limits, No PMA/PMR  Gait/station: Not observed  Speech: talking in soft tone with normal latency and decreased amount  Language: No overt abnormality  Mood/affect: depressed, dysphoric / Affect was tearful, hyper-intense, mood-congruent  Thought Processes: concrete  Thought content: denied suicidal ideations or homicidal ideations, no overt delusions elicited  Associations: concrete associations  Perceptual disturbances: denies Auditory/Visual/Tactile Hallucinations  Orientation: oriented to place and person  Cognitive Function: intact  Memory: not cooperative with formal MMSE  Intellect: unable to assess  Fund of knowledge: vocabulary average and unable to assess due to patient factors  Impulse control: poor  Insight/judgment: poor/impaired    Pain: reported having pain in back   Pain scale: did not score the pain    Lab Results: I have personally reviewed pertinent lab results          WBC   Date Value Ref Range Status   07/15/2022 8 55 4 31 - 10 16 Thousand/uL Final     WBC, UA   Date Value Ref Range Status   12/21/2021 2-4 None Seen, 0-1, 1-2, 0-5, 2-4 /hpf Final     MCV   Date Value Ref Range Status   07/15/2022 94 82 - 98 fL Final     Lab Results   Component Value Date    BUN 10 07/15/2022    SODIUM 139 07/15/2022    CO2 23 07/15/2022     Lab Results   Component Value Date    ALKPHOS 56 07/15/2022     No results found for: CKMB  Lab Results   Component Value Date    TSH 3 90 08/24/2017     INR   Date Value Ref Range Status   02/01/2022 0 97 0 84 - 1 19 Final   02/17/2021 0 95 0 84 - 1 19 Final   11/13/2019 0 94 0 84 - 1 19 Final     No results found for: APTT  No results found for: PHENO  Sodium   Date Value Ref Range Status   07/15/2022 139 135 - 147 mmol/L Final   09/06/2018 138 135 - 146 mmol/L Final     BUN   Date Value Ref Range Status   07/15/2022 10 5 - 25 mg/dL Final   09/06/2018 7 7 - 25 mg/dL Final     SL AMB BUN/CREATININE RATIO   Date Value Ref Range Status   46/18/4395 NOT APPLICABLE 6 - 22 (calc) Final     Creatinine   Date Value Ref Range Status   07/15/2022 0 62 0 60 - 1 30 mg/dL Final     Comment:     Standardized to IDMS reference method     TSH   Date Value Ref Range Status   08/24/2017 3 90 mIU/L Final     Comment:               Reference Range                         > or = 20 Years  0 40-4 50                              Pregnancy Ranges            First trimester    0 26-2 66            Second trimester   0 55-2 73            Third trimester    0 43-2 91       TSH 3RD GENERATON   Date Value Ref Range Status   05/25/2022 8 290 (H) 0 450 - 4 500 uIU/mL Final     Comment:     The recommended reference ranges for TSH during pregnancy are as follows:   First trimester 0 1 to 2 5 uIU/mL   Second trimester  0 2 to 3 0 uIU/mL   Third trimester 0 3 to 3 0 uIU/m    Note: Normal ranges may not apply to patients who are transgender, non-binary, or whose legal sex, sex at birth, and gender identity differ  Adult TSH (3rd generation) reference range follows the recommended guidelines of the [employer] Association, January, 2020       WBC   Date Value Ref Range Status   07/15/2022 8 55 4 31 - 10 16 Thousand/uL Final     WBC, UA   Date Value Ref Range Status   12/21/2021 2-4 None Seen, 0-1, 1-2, 0-5, 2-4 /hpf Final     No components found for: B12  Lab Results   Component Value Date    FOLATE >20 0 (H) 12/22/2021     Lab Results   Component Value Date    RPR Non-Reactive 01/06/2022         Imaging Studies: reviewed    EKG, Pathology, and Other Studies: reviewed    Code Status:Full code    Patient Strengths/Assets: access to [facility] service    Patient Barriers/Limitations: limited family ties, limited motivation, poor insight, substance abuse, uncooperative    Suicide/Homicide Risk Assessment:    Risk of Harm to Self:   Nursing Suicide Risk Assessment Last 24 hours: C-SSRS Risk (Since Last Contact)  Calculated C-SSRS Risk Score (Since Last Contact): High Risk  Current Specific Risk Factors include: recent suicide attempt, self injurious behavior, unable to contract for safety on the unit, current depressive symptoms, hopelessness, unable to visualize a realistic positive future, chronic pain, social isolation  Protective Factors: having pets, no other protective factor at this time  Based on today's assessment, Jethro Tsang presents the following risk of harm to self: high    Risk of Harm to Others:  Nursing Homicide Risk Assessment: Violence Risk to Others: Denies within past 6 months  Current Specific Risk Factors include: none  Protective Factors: no current homicidal ideation  Based on today's assessment, Jethro Tsang presents the following risk of harm to others: low    The following interventions are recommended: behavioral checks every 7 minutes, continued hospitalization on locked unit, 1:1 for safety concerns and suicidality    Assessment/Plan     Principal Problem:    Schizoaffective disorder, bipolar type ([facility] )  Active Problems:    Benign essential hypertension    Edema    Hypothyroidism    MAG (obstructive sleep apnea)    Overactive bladder    Marijuana abuse    COPD (chronic obstructive pulmonary disease) ([facility])    Class 3 severe obesity due to excess calories with serious comorbidity and body mass index (BMI) of 40 0 to 44 9 in adult [facility])    Medical clearance for psychiatric admission    GERD (gastroesophageal reflux disease)    Diarrhea    Borderline personality disorder ([place] [address] )    Plan:   Risks, benefits and possible side effects of Medications:   Risks, benefits, and possible side effects of medications explained to patient and patient verbalizes understanding  - Expand collat information from ACT team  - f/u SLIM recs regarding the medical problems  - Continue medication titration and treatment plan; adjust medication to optimize treatment response and as clinically indicated       Scheduled medications:  Current Facility-Administered Medications   Medication Dose Route Frequency Provider Last Rate    albuterol  2 puff Inhalation Q6H PRN Cindi Cavanaugh MD      aluminum-magnesium hydroxide-simethicone  30 mL Oral Q4H PRN Cindi Cavanaugh MD      amLODIPine  5 mg Oral Daily Cindi Cavanaugh MD      artificial tear  1 application Both Eyes J4Q PRN Cindi Cavanaugh MD      haloperidol lactate  2 5 mg Intramuscular Q6H PRN Max 4/day Cindi Cavanaugh MD      And    LORazepam  1 mg Intramuscular Q6H PRN Max 4/day Cindi Cavanaugh MD      And    benztropine  0 5 mg Intramuscular Q6H PRN Max 4/day Cindi Cavanaugh MD      haloperidol lactate  5 mg Intramuscular Q4H PRN Max 4/day Cindi Cavanaugh MD      And    LORazepam  2 mg Intramuscular Q4H PRN Max 4/day Cindi Cavanaugh MD      And    benztropine  1 mg Intramuscular Q4H PRN Max 4/day Cindi Cavanaugh MD      benztropine  1 mg Intramuscular Q4H PRN Max 6/day Cindi Cavanaugh MD      benztropine  1 mg Oral Q4H PRN Max 6/day Cindi Cavanaugh MD      buPROPion  150 mg Oral Daily Giselle Gunter MD      cholecalciferol  5,000 Units Oral Daily Cindi Cavanaugh MD      colestipol  1 g Oral BID Nicol Greer PA-C      estradiol  0 5 mg Oral Daily Cindi Cavanaugh MD      fluticasone  1 puff Inhalation Daily Cindi Cavanaugh MD      furosemide  20 mg Oral Daily Cindi aCvanaugh MD      haloperidol  2 mg Oral Q4H PRN Max 6/day Cindi Cavanaugh MD      haloperidol  5 mg Oral Q6H PRN Max 4/day Cindi Cavanaugh MD      haloperidol  5 mg Oral Q4H PRN Max 4/day Cindi Cavanaugh MD      hydrOXYzine HCL  25 mg Oral Q6H PRN Max 4/day Josias Knutson MD      hydrOXYzine HCL  50 mg Oral Q4H PRN Max 4/day Josias Knutson MD      Or    LORazepam  1 mg Intramuscular Q4H PRN Josias Knutson MD      ibuprofen  400 mg Oral Q4H PRN Josias Knutson MD      ibuprofen  600 mg Oral Q6H PRN Josias Knutson MD      ibuprofen  800 mg Oral Q8H PRN Josias Knutson MD      ketotifen  1 drop Both Eyes BID Josias Knutson MD      levothyroxine  100 mcg Oral Early Morning Josias Knutson MD      loperamide  2 mg Oral TID PRN Josias Knutson MD      loratadine  10 mg Oral Daily Josias Knutson MD      LORazepam  1 mg Oral Q4H PRN Max 6/day Josias Knutson MD      Or    LORazepam  2 mg Intramuscular Q6H PRN Max 3/day Josias Knutson MD      losartan  50 mg Oral Daily Josias Knutson MD      lurasidone  20 mg Oral Daily With Breakfast Josias Knutson MD      lurasidone  60 mg Oral Daily With Breeenrike Alvares MD      melatonin  3 mg Oral HS PRN Josias Knutson MD      metoprolol tartrate  25 mg Oral Q12H [address] Josias Knutson MD      oxybutynin  5 mg Oral Daily Josias Knutson MD      pantoprazole  40 mg Oral BID [facility] Josias Knutson MD      pilocarpine  5 mg Oral TID Josias Knutson MD      polyethylene glycol  17 g Oral Daily PRN Josias Knutson MD      propranolol  10 mg Oral Q8H PRN Josias Knutson MD      rOPINIRole  0 5 mg Oral HS Josias Knutson MD      senna-docusate sodium  1 tablet Oral Daily PRN Josias Knutson MD      topiramate  100 mg Oral BID Josias Knutson MD          PRN:  Marcelino Pert  albuterol    aluminum-magnesium hydroxide-simethicone    artificial tear    haloperidol lactate **AND** LORazepam **AND** benztropine    haloperidol lactate **AND** LORazepam **AND** benztropine    benztropine    benztropine    haloperidol    haloperidol    haloperidol   hydrOXYzine HCL    hydrOXYzine HCL **OR** LORazepam    ibuprofen    ibuprofen    ibuprofen    loperamide    LORazepam **OR** LORazepam    melatonin    polyethylene glycol    propranolol    senna-docusate sodium    - Observation: 1:1 for safety and suicidality   - VS: as per unit protocol  - Legal status:  201   - Diet: Regular diet  - Psychoeducation (benefits and potential risks) discussed, importance of compliance with the psychiatric treatment reiterated, and the patient verbalized understanding of the matter  - Encourage group attendance and milieu therapy     - The pt was educated and agreed to verbalize any suicidal thoughts, frustrations or concerns to the nursing staff, immediately  - Dispo:  To be determined       Next of Kin  Extended Emergency Contact Information  Primary Emergency Contact: Leonor Ji  Address: [address] Phone: [phone]  Mobile Phone: [phone]  Relation: Meredith Cortez MD  Attending [address]

## 2022-07-16 NOTE — ASSESSMENT & PLAN NOTE
Patient is medically cleared for admission to the [facility] for treatment of the underlying psychiatric illness

## 2022-07-16 NOTE — NURSING NOTE
Pt resting quietly  When awaken for readiness to d/c restraints pt showed no need to continue restraint order  Pt appeared to be in control of emotions and agreed to remain calm  Restraints removed  Pt remains of 1:1 obs for safety

## 2022-07-16 NOTE — PLAN OF CARE
Problem: Prexisting or High Potential for Compromised Skin Integrity  Goal: Skin integrity is maintained or improved  Description: INTERVENTIONS:  - Identify patients at risk for skin breakdown  - Assess and monitor skin integrity  - Assess and monitor nutrition and hydration status  - Monitor labs   - Assess for incontinence   - Turn and reposition patient  - Assist with mobility/ambulation  - Relieve pressure over bony prominences  - Avoid friction and shearing  - Provide appropriate hygiene as needed including keeping skin clean and dry  - Evaluate need for skin moisturizer/barrier cream  - Collaborate with interdisciplinary team   - Patient/family teaching  - Consider wound care consult   Outcome: Not Progressing     Problem: Nutrition/Hydration-ADULT  Goal: Nutrient/Hydration intake appropriate for improving, restoring or maintaining nutritional needs  Description: Monitor and assess patient's nutrition/hydration status for malnutrition  Collaborate with interdisciplinary team and initiate plan and interventions as ordered  Monitor patient's weight and dietary intake as ordered or per policy  Utilize nutrition screening tool and intervene as necessary  Determine patient's food preferences and provide high-protein, high-caloric foods as appropriate       INTERVENTIONS:  - Monitor oral intake, urinary output, labs, and treatment plans  - Assess nutrition and hydration status and recommend course of action  - Evaluate amount of meals eaten  - Assist patient with eating if necessary   - Allow adequate time for meals  - Recommend/ encourage appropriate diets, oral nutritional supplements, and vitamin/mineral supplements  - Order, calculate, and assess calorie counts as needed  - Recommend, monitor, and adjust tube feedings and TPN/PPN based on assessed needs  - Assess need for intravenous fluids  - Provide specific nutrition/hydration education as appropriate  - Include patient/family/caregiver in decisions related to nutrition  Outcome: Not Progressing     Problem: Alteration in Thoughts and Perception  Goal: Treatment Goal: Gain control of psychotic behaviors/thinking, reduce/eliminate presenting symptoms and demonstrate improved reality functioning upon discharge  Outcome: Not Progressing  Goal: Verbalize thoughts and feelings  Description: Interventions:  - Promote a nonjudgmental and trusting relationship with the patient through active listening and therapeutic communication  - Assess patient's level of functioning, behavior and potential for risk  - Engage patient in 1 on 1 interactions  - Encourage patient to express fears, feelings, frustrations, and discuss symptoms    - [place] patient to reality, help patient recognize reality-based thinking   - Administer medications as ordered and assess for potential side effects  - Provide the patient education related to the signs and symptoms of the illness and desired effects of prescribed medications  Outcome: Not Progressing     Problem: Ineffective Coping  Goal: Identifies ineffective coping skills  Outcome: Not Progressing     Problem: Risk for Self Injury/Neglect  Goal: Treatment Goal: Remain safe during length of stay, learn and adopt new coping skills, and be free of self-injurious ideation, impulses and acts at the time of discharge  Outcome: Not Progressing  Goal: Verbalize thoughts and feelings  Description: Interventions:  - Assess and re-assess patient's lethality and potential for self-injury  - Engage patient in 1:1 interactions, daily, for a minimum of 15 minutes  - Encourage patient to express feelings, fears, frustrations, hopes  - Establish rapport/trust with patient   Outcome: Not Progressing  Goal: Refrain from harming self  Description: Interventions:  - Monitor patient closely, per order  - Develop a trusting relationship  - Supervise medication ingestion, monitor effects and side effects   Outcome: Not Progressing     Problem: Depression  Goal: Refrain from harming self  Description: Interventions:  - Monitor patient closely, per order   - Supervise medication ingestion, monitor effects and side effects   Outcome: Not Progressing     Problem: SAFETY, RESTRAINT - VIOLENT/SELF-DESTRUCTIVE  Goal: Remains free of harm/injury from restraints (Restraint for Violent/Self-Destructive Behavior)  Description: INTERVENTIONS:  - Instruct patient/family regarding restraint use   - Assess and monitor physiologic and psychological status   - Provide interventions and comfort measures to meet assessed patient needs   - Ensure continuous in person monitoring is provided   - Identify and implement measures to help patient regain control  - Assess readiness for release of restraint  Outcome: Not Progressing  Goal: Returns to optimal restraint-free functioning  Description: INTERVENTIONS:  - Assess the patient's behavior and symptoms that indicate continued need for restraint  - Identify and implement measures to help patient regain control  - Assess readiness for release of restraint   Outcome: Not Progressing     Problem: INVOLUNTARY ADMIT  Goal: Will cooperate with staff recommendations and doctor's orders and will demonstrate appropriate behavior  Description: INTERVENTIONS:  - Treat underlying conditions and offer medication as ordered  - Educate regarding involuntary admission procedures and rules  - Utilize positive consistent limit setting strategies to support patient and staff safety  Outcome: Not Progressing     Problem: Ineffective Coping  Goal: Participates in unit activities  Description: Interventions:  - Provide therapeutic environment   - Provide required programming   - Redirect inappropriate behaviors   Outcome: Not Progressing     Problem: DISCHARGE PLANNING - CARE MANAGEMENT  Goal: Discharge to post-acute care or home with appropriate resources  Description: INTERVENTIONS:  - Conduct assessment to determine patient/family and health care team treatment goals, and need for post-acute services based on payer coverage, community resources, and patient preferences, and barriers to discharge  - Address psychosocial, clinical, and financial barriers to discharge as identified in assessment in conjunction with the patient/family and health care team  - Arrange appropriate level of post-acute services according to patients   needs and preference and payer coverage in collaboration with the physician and health care team  - Communicate with and update the patient/family, physician, and health care team regarding progress on the discharge plan  - Arrange appropriate transportation to post-acute venues  Outcome: Not Progressing

## 2022-07-16 NOTE — NURSING NOTE
Received pt from [facility] as a 201 after a suicide attempt at home by overdose with meth and Depakote  Pt has extensive psych history including multiple attempts of suicide by overdose and/or hanging  nurse to nurse report given by Michael at 6525  Stating pt had two attempts to hang herself today while on 1:1  Physician informed of CSSR score as high risk  1:1 obtained by Dr Juan Lugo  Pt continues to c/o SI upon admission  States " I don't want to live anymore  I'm worthless  I don't feel like I deserve to live  I don't feel safe"  Pt can not contract for safety at this time  Pt cooperative with admission process  However, admits to having impulsive behaviors and lack of self control when triggered by others

## 2022-07-16 NOTE — ASSESSMENT & PLAN NOTE
· Follows with sleep medicine  · Uses autoPAP at home - Settings 12-20  · Will order CPAP while inpatient

## 2022-07-16 NOTE — NURSING NOTE
Patient was give IM haldol 5 mg, Im cogentin 1 mg and 2 mg of ativan IM because she was throwing her belongs  In her room, cursing, yelling, screaming, and threatening staff at 2:28 pm   Patient eventually fell asleep   Medication effective

## 2022-07-16 NOTE — ASSESSMENT & PLAN NOTE
· BP stable since admitted to the unit  · Continue pre hospital anti hypertensive regimen:  · Amlodipine 5mg QD  · Losartan 50mg QD  · Metoprolol tartrate 25mg BID  · Lasix 20mg QD  · Monitor VS per unit protocol

## 2022-07-16 NOTE — CONSULTS
[address] [place] [address] 1970, 49 y o  female MRN: [id number]  Unit/Bed#: U 344-01 Encounter: [id number]  Primary Care Provider: JHONATAN Terry   Date and time admitted to hospital: 7/15/2022  8:13 PM    Inpatient consult for Medical Clearance for [address] patient  Consult performed by: Darian Chapa PA-C  Consult ordered by: Ofelia Stock MD        Medical clearance for psychiatric admission  Assessment & Plan  Patient is medically cleared for admission to the [facility] for treatment of the underlying psychiatric illness    Borderline personality disorder [facility])  Assessment & Plan  · Management per psychiatry     Diarrhea  Assessment & Plan  · Chronic   · Continue pre hospital colestipol 1g bid    GERD (gastroesophageal reflux disease)  Assessment & Plan  · Pre hospital rabeprazole 20mg QD not on hospital formulary  · Replace with pantoprazole 40mg QD while inpatient    Class 3 severe obesity due to excess calories with serious comorbidity and body mass index (BMI) of 40 0 to 44 9 in adult [facility])  Assessment & Plan  · BMI: 44 97  · Encouraged lifestyle modifications    COPD (chronic obstructive pulmonary disease) ([facility] [address] )  Assessment & Plan  · No acute exacerbation  Oxygenating welll on room air   O2 sats at 95%  · Continue pre hospital inhalers:  · Flovent 2 puffs bid (patient brought from home)  · Albuterol PRN    Marijuana abuse  Assessment & Plan  · Management per psychiatry    Overactive bladder  Assessment & Plan  · Pre hospital Tovia not on hospital formulary  · Replace with oxybutynin 5mg QD  · Resume Tovia upon discharge    MAG (obstructive sleep apnea)  Assessment & Plan  · Follows with sleep medicine  · Uses autoPAP at home - Settings 12-20  · Will order CPAP while inpatient    Hypothyroidism  Assessment & Plan  · Continue pre hospital levothyroxine 100mcg QAM    Edema  Assessment & Plan  · Chronic   · Continue pre hospital Lasix 20mg daily    Benign essential hypertension  Assessment & Plan  · BP stable since admitted to the unit  · Continue pre hospital anti hypertensive regimen:  · Amlodipine 5mg QD  · Losartan 50mg QD  · Metoprolol tartrate 25mg BID  · Lasix 20mg QD  · Monitor VS per unit protocol    * Schizoaffective disorder, bipolar type [facility])  Assessment & Plan  · Management per psychiatry    ECT Clearance:   History of recent seizure or stroke:  Unable to assess   History of pheochromocytoma:  Unable to assess   History of active bleeding (Intracranial hemorrhage, aneurysm or AVM):  Unable to assess   History of metallic implants in the head or neck:  Unable to assess   History of increased intracranial pressure with mass effect:  Unable to assess  ·   EKG within 3 months? Yes - 07/14 /22  o If yes, was an arrhythmia present and at baseline? Sinus arrhythmia with PACs  o If yes, what is the baseline QT/QTc interval? 408/410  o If no, obtain prior to ECT for arrhythmia evaluation and baseline QT interval     Based on above criteria, Patient is not medically cleared for ECT should it be recommended  Counseling / Coordination of Care Time: 45 minutes  Greater than 50% of total time spent on patient counseling and coordination of care  Collaboration of Care: Were Recommendations Directly Discussed with Primary Treatment Team? - Yes     History of Present Illness:    Ulises Spain is a 49 y o  female who is originally admitted to the psychiatry service due to suicide attempt  We are consulted for medical clearance for admission to [address] [place] [address] and treatment of underlying psychiatric illness  This patient has a past medical history significant for GERD, OAB, IBS, COPD, Hypothyroidism, HTN, MAG on CPAP  She initially presented to the ED after attempting suicide by taking 14 tablets of Depakote and inhaling meth  On evaluation, patient is crying uncontrollably  States she wants to die  Feels she is not being cared for appropriately  States she sleeps in a hospital bed at home and the bed here is hurting her back  Did not receive the meds she brought from home and is refusing to eat or drink  Review of Systems:    Review of Systems   Unable to perform ROS: Psychiatric disorder       Past Medical and Surgical History:     Past Medical History:   Diagnosis Date    Anxiety     Arthritis     oa cassandra knees    Asthma     good control- no medications    Yan's esophagus     Bipolar affective disorder (HCC)     Chronic pain disorder     lumbar    CPAP (continuous positive airway pressure) dependence     Degenerative disc disease at L5-S1 level     Deliberate self-cutting     Depression 09/16/2008    Disease of thyroid gland     hypo    MARTINEZ (dyspnea on exertion)     Drug overdose 10/28/2008    suicide attempt    Dysphagia     Dyspnea     Edema     BLE    GERD (gastroesophageal reflux disease)     High blood pressure     History of COVID-19 12/30/2020    Symptoms started on 12/30/2020 and then got worse  Today she is feeling a little bit better  She is a candidate for monoclonal antibody infusion and set for 1/6/21 @ 1pm at [facility]  01/07/21 - telemedicine -     Hypertension     Knee pain, bilateral     Especially right    Migraines     Obese     Overactive bladder     Sjogren's disease ([address] )     Sleep apnea     Stress incontinence     Suicidal ideations     Umbilical hernia     surgical repair today 4/24/2019    Use of cane as ambulatory aid     awaiting b/l knee replacement    Wears glasses        Past Surgical History:   Procedure Laterality Date    BREAST CYST EXCISION Right 1989    CARPAL TUNNEL RELEASE Left     CHOLECYSTECTOMY  05/2003    Laparoscopic    COLONOSCOPY      01/12/2009    DILATION AND CURETTAGE OF UTERUS      ELBOW SURGERY Left     x2   No hardware    ESOPHAGOGASTRODUODENOSCOPY      FOOT SURGERY Left     Plantar fasciotomy    HYSTERECTOMY      laporoscopic, partial    KNEE ARTHROSCOPY Bilateral     OOPHORECTOMY Left 10/2015    CO ANAL SPHINCTEROTOMY N/A 2018    Procedure: EUA, LEFT PARTIAL INTERNAL SPHINCTEROTOMY;  Surgeon: Pattie Bonner MD;  Location: [address] [place] [address] MAIN OR;  Service: Colorectal    CO GASTROCNEMIUS RECESSION Left 2021    Procedure: RECESSION GASTROC OPEN;  Surgeon: Alejandra Lynn DPM;  Location: [address] [place] [address] MAIN OR;  Service: [place] [address] ZCCP,4+A/O,GHPSJ N/A 2019    Procedure: REPAIR HERNIA UMBILICAL LAPAROSCOPIC W/ ROBOTICS;  Surgeon: Nighat Doss MD;  Location: [facility] Main OR;  Service: General    REDUCTION MAMMAPLASTY Bilateral     REPAIR LACERATION Left     left hand-2009    REPLACEMENT TOTAL KNEE Right     ROTATOR CUFF REPAIR Left     TONSILECTOMY AND ADNOIDECTOMY      TONSILLECTOMY      US GUIDED MSK PROCEDURE  2021    VEIN LIGATION Bilateral     WISDOM TOOTH EXTRACTION         Meds/Allergies:    all medications and allergies reviewed    Allergies: Allergies   Allergen Reactions    Percocet [Oxycodone-Acetaminophen] Headache     Severe headaches   (denies issues with Tylenol)    Povidone Iodine Rash     Unsure if betadine or gauze post surgical  Got rash on leg  Has  Had itchy rashes after every surgery prep and IV insertion    Chlorhexidine Rash       Social History:     Marital Status: Single    Substance Use History:   Social History     Substance and Sexual Activity   Alcohol Use Not Currently    Comment: Recovering alcoholic     Social History     Tobacco Use   Smoking Status Former Smoker    Packs/day: 2 00    Years: 33 00    Pack years: 66 00    Types: Cigarettes    Start date: 5    Quit date: 2018    Years since quittin 5   Smokeless Tobacco Never Used   Tobacco Comment    Started at age 14       Social History     Substance and Sexual Activity   Drug Use Yes    Types: Marijuana, Methamphetamines    Comment: medical- vapes 3 times per wk at        Family History:    non-contributory    Physical Exam:     Vitals:   Blood Pressure: 110/53 (07/16/22 1025)  Pulse: (!) 50 (07/16/22 1025)  Temperature: (!) 96 7 °F (35 9 °C) (07/16/22 1025)  Temp Source: Temporal (07/16/22 1025)  Respirations: 20 (07/16/22 1025)  Height: 5' 6" (167 6 cm) (07/15/22 2020)  Weight - Scale: 126 kg (278 lb 9 6 oz) (07/15/22 2020)  SpO2: 98 % (07/16/22 1025)    Physical Exam  Constitutional:       Appearance: She is obese  HENT:      Head: Normocephalic and atraumatic  Nose: No congestion or rhinorrhea  Mouth/Throat:      Mouth: Mucous membranes are moist    Eyes:      General: No scleral icterus  Extraocular Movements: Extraocular movements intact  Skin:     General: Skin is warm and dry  Neurological:      General: No focal deficit present  Mental Status: She is alert and oriented to person, place, and time  Psychiatric:         Mood and Affect: Affect is tearful  Additional Data:     Lab Results: I have personally reviewed pertinent reports        Results from last 7 days   Lab Units 07/15/22  0443   WBC Thousand/uL 8 55   HEMOGLOBIN g/dL 14 9   HEMATOCRIT % 46 0   PLATELETS Thousands/uL 194   NEUTROS PCT % 58   LYMPHS PCT % 29   MONOS PCT % 11   EOS PCT % 1     Results from last 7 days   Lab Units 07/15/22  0443   SODIUM mmol/L 139   POTASSIUM mmol/L 3 7   CHLORIDE mmol/L 109*   CO2 mmol/L 23   BUN mg/dL 10   CREATININE mg/dL 0 62   ANION GAP mmol/L 7   CALCIUM mg/dL 8 7   ALBUMIN g/dL 3 5   TOTAL BILIRUBIN mg/dL 0 41   ALK PHOS U/L 56   ALT U/L 18   AST U/L 13   GLUCOSE RANDOM mg/dL 96             Lab Results   Component Value Date/Time    HGBA1C 5 2 02/22/2022 08:28 AM    HGBA1C 4 9 12/18/2020 09:07 AM    HGBA1C 5 3 06/26/2020 03:37 PM     Results from last 7 days   Lab Units 07/14/22  0027   POC GLUCOSE mg/dl 82       EKG, Pathology, and Other Studies Reviewed on Admission:   · EKG:    Sinus rhythm with Premature atrial complexes  Otherwise normal ECG  When compared with ECG of 14-JUL-2022 16:30, (unconfirmed)  Premature atrial complexes are now Present  Confirmed by Mita Cardona ([id number]) on 7/15/2022 7:29:47 AM    ** Please Note: This note has been constructed using a voice recognition system   **

## 2022-07-16 NOTE — NURSING NOTE
Patient complained of back pain 9/10 to T  Brought patient her scheduled medications along with 800 mg of PO PRN motrin and patient refused her meds  Patient was sitting on the floor ripping up her paper sheets    Patients behavior is childish

## 2022-07-16 NOTE — ASSESSMENT & PLAN NOTE
· Pre hospital rabeprazole 20mg QD not on hospital formulary  · Replace with pantoprazole 40mg QD while inpatient

## 2022-07-16 NOTE — NURSING NOTE
Patient continues to be extremely restless and not be able to follow directions  When staff tried to educate her about the reason for restraints she talks over them  Remains on continuous monitoring

## 2022-07-16 NOTE — RESTRAINT FACE TO FACE
Restraint Face to Face   Yessi Sinha 49 y o  female MRN: [id number]  Unit/Bed#: [facility] 344-01 Encounter: [id number]      Physical Evaluation: Pt punching the wall  Pt was throwing furniture  Pt threatening to hurt self  Purpose for Restraints/ Seclusion High risk for self harm, High risk for causing significant disruption of treatment environment , High risk for harm to others and high risk for flight  Patient's reaction to the intervention: Pt was kicking and trying to fight while being restrained  Patient's medical condition: Stable   Patient's Behavioral condition: Pt screaming, yelling and angry    Restraints to be Continued

## 2022-07-16 NOTE — TREATMENT PLAN
TREATMENT PLAN REVIEW - [address] 49 y o  1970 female MRN: [id number]    [address] [address] Room / Bed: [facility] 344/[facility] 344-01 Encounter: [id number]          Admit Date/Time:  7/15/2022  8:13 PM    Treatment Team: Attending Provider: Canelo Slaughter MD; Consulting Physician: Percy Nelson PA-C; Patient Care Technician: [facility];  Patient Care Assistant: Aris Vega; Registered Nurse: Kala Andino RN; Patient Care Technician: Juliette Anderson    Diagnosis: Principal Problem:    Schizoaffective disorder, bipolar type ([place] [id number] )  Active Problems:    Benign essential hypertension    Edema    Hypothyroidism    MAG (obstructive sleep apnea)    Overactive bladder    Marijuana abuse    COPD (chronic obstructive pulmonary disease) ([place] [address] )    Class 3 severe obesity due to excess calories with serious comorbidity and body mass index (BMI) of 40 0 to 44 9 in adult [facility])    Medical clearance for psychiatric admission    GERD (gastroesophageal reflux disease)    Diarrhea    Borderline personality disorder ([place] [address] )      Patient Strengths/Assets: followed by [facility]    Patient Barriers/Limitations: limited motivation, limited support system, poor insight, poor interpersonal skills, substance abuse, uncooperative    Short Term Goals: decrease in depressive symptoms, decrease in paranoid thoughts, decrease in psychotic symptoms, decrease in suicidal thoughts, ability to stay safe on the unit, improvement in ability to express basic needs, improvement in insight, improvement in reality testing    Long Term Goals: stabilization of mood, free of suicidal thoughts, free of homicidal thoughts, resolution of psychotic symptoms    Progress Towards Goals: starting psychiatric medications as prescribed    Recommended Treatment: medication management, patient medication education, group therapy, milieu therapy, continued Behavioral Health psychiatric evaluation/assessment process    Treatment Frequency: daily medication monitoring, group and milieu therapy daily, monitoring through interdisciplinary rounds, monitoring through weekly patient care conferences    Expected Discharge Date:  14 days    Discharge Plan: referral for outpatient drug and alcohol counseling, referral for outpatient medication management with a psychiatrist, referral to [address] [place] [address] Team for close psychiatric monitoring, return to previous living arrangement    Treatment Plan Created/Updated By: Benny Nolan MD

## 2022-07-16 NOTE — CMS CERTIFICATION NOTE
Certification: Based upon physical, mental and social evaluations, I certify that inpatient psychiatric services are medically necessary for this patient for a duration of 14 midnights for the treatment of Schizoaffective disorder, bipolar type [facility])    Available alternative community resources do not meet the patient's mental health care needs  I further attest that an established written individualized plan of care has been implemented and is outlined in the patient's medical records

## 2022-07-16 NOTE — NURSING NOTE
Maintained on continual observation throughout the night  She has been sleeping since taken out of restraints

## 2022-07-16 NOTE — ASSESSMENT & PLAN NOTE
· Pre hospital Tovia not on hospital formulary  · Replace with oxybutynin 5mg QD  · Resume Tovia upon discharge

## 2022-07-16 NOTE — NURSING NOTE
Patient has been in bed on constant observation  She refused her afternoon meds because her pilocarpine hadnt come up from the pharmacy  Call was made to pharmacy and the order needed to state it was formulary  Order was corrected by medical   Pharmacy has not delivered the medication as of now  Patient has been crying because the medication has not arrived  She is labile, irritable, with poor eye contact  Patient has poor hygiene and disheveled

## 2022-07-16 NOTE — PLAN OF CARE
Problem: Prexisting or High Potential for Compromised Skin Integrity  Goal: Skin integrity is maintained or improved  Description: INTERVENTIONS:  - Identify patients at risk for skin breakdown  - Assess and monitor skin integrity  - Assess and monitor nutrition and hydration status  - Monitor labs   - Assess for incontinence   - Turn and reposition patient  - Assist with mobility/ambulation  - Relieve pressure over bony prominences  - Avoid friction and shearing  - Provide appropriate hygiene as needed including keeping skin clean and dry  - Evaluate need for skin moisturizer/barrier cream  - Collaborate with interdisciplinary team   - Patient/family teaching  - Consider wound care consult   Outcome: Not Progressing     Problem: Nutrition/Hydration-ADULT  Goal: Nutrient/Hydration intake appropriate for improving, restoring or maintaining nutritional needs  Description: Monitor and assess patient's nutrition/hydration status for malnutrition  Collaborate with interdisciplinary team and initiate plan and interventions as ordered  Monitor patient's weight and dietary intake as ordered or per policy  Utilize nutrition screening tool and intervene as necessary  Determine patient's food preferences and provide high-protein, high-caloric foods as appropriate       INTERVENTIONS:  - Monitor oral intake, urinary output, labs, and treatment plans  - Assess nutrition and hydration status and recommend course of action  - Evaluate amount of meals eaten  - Assist patient with eating if necessary   - Allow adequate time for meals  - Recommend/ encourage appropriate diets, oral nutritional supplements, and vitamin/mineral supplements  - Order, calculate, and assess calorie counts as needed  - Recommend, monitor, and adjust tube feedings and TPN/PPN based on assessed needs  - Assess need for intravenous fluids  - Provide specific nutrition/hydration education as appropriate  - Include patient/family/caregiver in decisions related to nutrition  Outcome: Not Progressing     Problem: Alteration in Thoughts and Perception  Goal: Treatment Goal: Gain control of psychotic behaviors/thinking, reduce/eliminate presenting symptoms and demonstrate improved reality functioning upon discharge  Outcome: Not Progressing  Goal: Verbalize thoughts and feelings  Description: Interventions:  - Promote a nonjudgmental and trusting relationship with the patient through active listening and therapeutic communication  - Assess patient's level of functioning, behavior and potential for risk  - Engage patient in 1 on 1 interactions  - Encourage patient to express fears, feelings, frustrations, and discuss symptoms    - [place] patient to reality, help patient recognize reality-based thinking   - Administer medications as ordered and assess for potential side effects  - Provide the patient education related to the signs and symptoms of the illness and desired effects of prescribed medications  Outcome: Not Progressing     Problem: Ineffective Coping  Goal: Identifies ineffective coping skills  Outcome: Not Progressing     Problem: Risk for Self Injury/Neglect  Goal: Treatment Goal: Remain safe during length of stay, learn and adopt new coping skills, and be free of self-injurious ideation, impulses and acts at the time of discharge  Outcome: Not Progressing  Goal: Verbalize thoughts and feelings  Description: Interventions:  - Assess and re-assess patient's lethality and potential for self-injury  - Engage patient in 1:1 interactions, daily, for a minimum of 15 minutes  - Encourage patient to express feelings, fears, frustrations, hopes  - Establish rapport/trust with patient   Outcome: Not Progressing  Goal: Refrain from harming self  Description: Interventions:  - Monitor patient closely, per order  - Develop a trusting relationship  - Supervise medication ingestion, monitor effects and side effects   Outcome: Not Progressing     Problem: Depression  Goal: Refrain from harming self  Description: Interventions:  - Monitor patient closely, per order   - Supervise medication ingestion, monitor effects and side effects   Outcome: Not Progressing

## 2022-07-16 NOTE — ASSESSMENT & PLAN NOTE
· No acute exacerbation  Oxygenating welll on room air   O2 sats at 95%  · Continue pre hospital inhalers:  · Flovent 2 puffs bid (patient brought from home)  · Albuterol PRN

## 2022-07-16 NOTE — NURSING NOTE
Pt requested extra pillows and bedding  When told that may not be possible, pt began yelling and flipping chairs/tables in the dayroom  Pt was not redirectable when asked to go to room pt began to yell louder and crying  Pt offered prn medication to assist with agitation but refused  Security called for a assistance  Pt escorted to room by security and administered prn Ativan and cogentin IM  Shortly after, while speaking with pt she she continued to show aggressive behaviors, flipping over bedroom furniture and banging walls  Pt unable to gain control  Haldol IM given at this time and pt placed in restraints for safety  Pt remained on 1:1    Pt c/o lower back pain 6/10   offered Ibuprofen 600 mg

## 2022-07-17 LAB
25(OH)D3 SERPL-MCNC: 53.4 NG/ML (ref 30–100)
ALBUMIN SERPL BCP-MCNC: 3.7 G/DL (ref 3–5.2)
ALP SERPL-CCNC: 69 U/L (ref 43–122)
ALT SERPL W P-5'-P-CCNC: 29 U/L
ANION GAP SERPL CALCULATED.3IONS-SCNC: 7 MMOL/L (ref 5–14)
AST SERPL W P-5'-P-CCNC: 29 U/L (ref 14–36)
ATRIAL RATE: 53 BPM
BASOPHILS # BLD AUTO: 0.04 THOUSANDS/ΜL (ref 0–0.1)
BASOPHILS NFR BLD AUTO: 1 % (ref 0–1)
BILIRUB SERPL-MCNC: 0.42 MG/DL
BUN SERPL-MCNC: 11 MG/DL (ref 5–25)
CALCIUM SERPL-MCNC: 8.6 MG/DL (ref 8.4–10.2)
CHLORIDE SERPL-SCNC: 108 MMOL/L (ref 97–108)
CHOLEST SERPL-MCNC: 227 MG/DL
CO2 SERPL-SCNC: 25 MMOL/L (ref 22–30)
CREAT SERPL-MCNC: 0.62 MG/DL (ref 0.6–1.2)
EOSINOPHIL # BLD AUTO: 0.03 THOUSAND/ΜL (ref 0–0.61)
EOSINOPHIL NFR BLD AUTO: 0 % (ref 0–6)
ERYTHROCYTE [DISTWIDTH] IN BLOOD BY AUTOMATED COUNT: 12.8 % (ref 11.6–15.1)
FOLATE SERPL-MCNC: 14.7 NG/ML (ref 3.1–17.5)
GFR SERPL CREATININE-BSD FRML MDRD: 105 ML/MIN/1.73SQ M
GLUCOSE P FAST SERPL-MCNC: 104 MG/DL (ref 70–99)
GLUCOSE SERPL-MCNC: 104 MG/DL (ref 70–99)
HCT VFR BLD AUTO: 46.2 % (ref 34.8–46.1)
HDLC SERPL-MCNC: 33 MG/DL
HGB BLD-MCNC: 15.4 G/DL (ref 11.5–15.4)
IMM GRANULOCYTES # BLD AUTO: 0.06 THOUSAND/UL (ref 0–0.2)
IMM GRANULOCYTES NFR BLD AUTO: 1 % (ref 0–2)
LDLC SERPL CALC-MCNC: 160 MG/DL
LYMPHOCYTES # BLD AUTO: 1.89 THOUSANDS/ΜL (ref 0.6–4.47)
LYMPHOCYTES NFR BLD AUTO: 24 % (ref 14–44)
MCH RBC QN AUTO: 30.6 PG (ref 26.8–34.3)
MCHC RBC AUTO-ENTMCNC: 33.3 G/DL (ref 31.4–37.4)
MCV RBC AUTO: 92 FL (ref 82–98)
MONOCYTES # BLD AUTO: 1.06 THOUSAND/ΜL (ref 0.17–1.22)
MONOCYTES NFR BLD AUTO: 14 % (ref 4–12)
NEUTROPHILS # BLD AUTO: 4.74 THOUSANDS/ΜL (ref 1.85–7.62)
NEUTS SEG NFR BLD AUTO: 60 % (ref 43–75)
NONHDLC SERPL-MCNC: 194 MG/DL
NRBC BLD AUTO-RTO: 0 /100 WBCS
P AXIS: 44 DEGREES
PLATELET # BLD AUTO: 169 THOUSANDS/UL (ref 149–390)
PMV BLD AUTO: 10.9 FL (ref 8.9–12.7)
POTASSIUM SERPL-SCNC: 3.5 MMOL/L (ref 3.6–5)
PR INTERVAL: 182 MS
PROT SERPL-MCNC: 6.9 G/DL (ref 5.9–8.4)
QRS AXIS: -17 DEGREES
QRSD INTERVAL: 96 MS
QT INTERVAL: 432 MS
QTC INTERVAL: 405 MS
RBC # BLD AUTO: 5.04 MILLION/UL (ref 3.81–5.12)
RPR SER QL: NORMAL
SODIUM SERPL-SCNC: 140 MMOL/L (ref 137–147)
T WAVE AXIS: 27 DEGREES
TRIGL SERPL-MCNC: 169 MG/DL
TSH SERPL DL<=0.05 MIU/L-ACNC: 2.07 UIU/ML (ref 0.45–4.5)
VALPROATE SERPL-MCNC: <10 UG/ML (ref 50–120)
VENTRICULAR RATE: 53 BPM
VIT B12 SERPL-MCNC: 708 PG/ML (ref 100–900)
WBC # BLD AUTO: 7.82 THOUSAND/UL (ref 4.31–10.16)

## 2022-07-17 PROCEDURE — 99232 SBSQ HOSP IP/OBS MODERATE 35: CPT | Performed by: STUDENT IN AN ORGANIZED HEALTH CARE EDUCATION/TRAINING PROGRAM

## 2022-07-17 PROCEDURE — 80053 COMPREHEN METABOLIC PANEL: CPT | Performed by: STUDENT IN AN ORGANIZED HEALTH CARE EDUCATION/TRAINING PROGRAM

## 2022-07-17 PROCEDURE — 93005 ELECTROCARDIOGRAM TRACING: CPT

## 2022-07-17 PROCEDURE — 82607 VITAMIN B-12: CPT | Performed by: STUDENT IN AN ORGANIZED HEALTH CARE EDUCATION/TRAINING PROGRAM

## 2022-07-17 PROCEDURE — 85025 COMPLETE CBC W/AUTO DIFF WBC: CPT | Performed by: STUDENT IN AN ORGANIZED HEALTH CARE EDUCATION/TRAINING PROGRAM

## 2022-07-17 PROCEDURE — 82306 VITAMIN D 25 HYDROXY: CPT | Performed by: STUDENT IN AN ORGANIZED HEALTH CARE EDUCATION/TRAINING PROGRAM

## 2022-07-17 PROCEDURE — 80164 ASSAY DIPROPYLACETIC ACD TOT: CPT | Performed by: STUDENT IN AN ORGANIZED HEALTH CARE EDUCATION/TRAINING PROGRAM

## 2022-07-17 PROCEDURE — 93010 ELECTROCARDIOGRAM REPORT: CPT | Performed by: INTERNAL MEDICINE

## 2022-07-17 PROCEDURE — 82746 ASSAY OF FOLIC ACID SERUM: CPT | Performed by: STUDENT IN AN ORGANIZED HEALTH CARE EDUCATION/TRAINING PROGRAM

## 2022-07-17 PROCEDURE — 94760 N-INVAS EAR/PLS OXIMETRY 1: CPT

## 2022-07-17 PROCEDURE — 86592 SYPHILIS TEST NON-TREP QUAL: CPT | Performed by: STUDENT IN AN ORGANIZED HEALTH CARE EDUCATION/TRAINING PROGRAM

## 2022-07-17 PROCEDURE — 84443 ASSAY THYROID STIM HORMONE: CPT | Performed by: STUDENT IN AN ORGANIZED HEALTH CARE EDUCATION/TRAINING PROGRAM

## 2022-07-17 PROCEDURE — 94660 CPAP INITIATION&MGMT: CPT

## 2022-07-17 PROCEDURE — 80061 LIPID PANEL: CPT | Performed by: STUDENT IN AN ORGANIZED HEALTH CARE EDUCATION/TRAINING PROGRAM

## 2022-07-17 RX ADMIN — LORAZEPAM 2 MG: 2 INJECTION INTRAMUSCULAR; INTRAVENOUS at 11:36

## 2022-07-17 RX ADMIN — MONTELUKAST SODIUM 1 G: 4 TABLET, CHEWABLE ORAL at 21:05

## 2022-07-17 RX ADMIN — BUPROPION HYDROCHLORIDE 150 MG: 150 TABLET, FILM COATED, EXTENDED RELEASE ORAL at 08:22

## 2022-07-17 RX ADMIN — BENZTROPINE MESYLATE 1 MG: 1 INJECTION INTRAMUSCULAR; INTRAVENOUS at 10:24

## 2022-07-17 RX ADMIN — ESTRADIOL 0.5 MG: 1 TABLET ORAL at 08:22

## 2022-07-17 RX ADMIN — HALOPERIDOL LACTATE 5 MG: 5 INJECTION, SOLUTION INTRAMUSCULAR at 10:25

## 2022-07-17 RX ADMIN — PILOCARPINE HYDRCHLORIDE 5 MG: 5 TABLET, FILM COATED ORAL at 21:05

## 2022-07-17 RX ADMIN — LORAZEPAM 2 MG: 2 INJECTION INTRAMUSCULAR; INTRAVENOUS at 11:35

## 2022-07-17 RX ADMIN — ROPINIROLE HYDROCHLORIDE 0.5 MG: 0.5 TABLET, FILM COATED ORAL at 21:05

## 2022-07-17 RX ADMIN — METOPROLOL TARTRATE 25 MG: 25 TABLET, FILM COATED ORAL at 08:22

## 2022-07-17 RX ADMIN — LURASIDONE HYDROCHLORIDE 20 MG: 20 TABLET, FILM COATED ORAL at 08:22

## 2022-07-17 RX ADMIN — IBUPROFEN 800 MG: 400 TABLET ORAL at 21:09

## 2022-07-17 RX ADMIN — OXYBUTYNIN CHLORIDE 5 MG: 5 TABLET, EXTENDED RELEASE ORAL at 08:22

## 2022-07-17 RX ADMIN — TOPIRAMATE 100 MG: 100 TABLET, FILM COATED ORAL at 08:22

## 2022-07-17 RX ADMIN — LORATADINE 10 MG: 10 TABLET ORAL at 08:22

## 2022-07-17 RX ADMIN — Medication 5000 UNITS: at 08:22

## 2022-07-17 RX ADMIN — FUROSEMIDE 20 MG: 20 TABLET ORAL at 08:22

## 2022-07-17 RX ADMIN — MONTELUKAST SODIUM 1 G: 4 TABLET, CHEWABLE ORAL at 08:30

## 2022-07-17 RX ADMIN — AMLODIPINE BESYLATE 5 MG: 5 TABLET ORAL at 08:22

## 2022-07-17 RX ADMIN — PANTOPRAZOLE SODIUM 40 MG: 40 TABLET, DELAYED RELEASE ORAL at 16:16

## 2022-07-17 RX ADMIN — LOSARTAN POTASSIUM 50 MG: 50 TABLET, FILM COATED ORAL at 08:22

## 2022-07-17 RX ADMIN — FLUTICASONE PROPIONATE 2 PUFF: 110 AEROSOL, METERED RESPIRATORY (INHALATION) at 08:30

## 2022-07-17 RX ADMIN — LURASIDONE HYDROCHLORIDE 60 MG: 40 TABLET, FILM COATED ORAL at 16:16

## 2022-07-17 RX ADMIN — PILOCARPINE HYDRCHLORIDE 5 MG: 5 TABLET, FILM COATED ORAL at 08:30

## 2022-07-17 RX ADMIN — TOPIRAMATE 100 MG: 100 TABLET, FILM COATED ORAL at 17:45

## 2022-07-17 RX ADMIN — PILOCARPINE HYDRCHLORIDE 5 MG: 5 TABLET, FILM COATED ORAL at 16:18

## 2022-07-17 NOTE — NURSING NOTE
Patient became angry and agitated because her CPAP was out of water at 10:30 am   Patient was not provided with the water and she became angry and agitated  Patient pulled the patient storage wall unit from the wall    Patient was give 5 mg IM Haldol, 2 mg ativan IM, and 1 mg cogentin IM

## 2022-07-17 NOTE — NURSING NOTE
Pt took her evening medications with a lot of encouragement  Pt asked to shower and brush her teeth  Pt has 5 sutures to left hand from a cyst that she had removed on June 20th, 2022 at [facility]  Sutures to be removed per pt on Tuesday 19, July 2022  August Roca Pt remains on continual observation  Pt presently denies SI/HI and AVH  Pt is stating she has back and shoulder pain because of the bed but refused any PO pain medications

## 2022-07-17 NOTE — PLAN OF CARE
Problem: Prexisting or High Potential for Compromised Skin Integrity  Goal: Skin integrity is maintained or improved  Description: INTERVENTIONS:  - Identify patients at risk for skin breakdown  - Assess and monitor skin integrity  - Assess and monitor nutrition and hydration status  - Monitor labs   - Assess for incontinence   - Turn and reposition patient  - Assist with mobility/ambulation  - Relieve pressure over bony prominences  - Avoid friction and shearing  - Provide appropriate hygiene as needed including keeping skin clean and dry  - Evaluate need for skin moisturizer/barrier cream  - Collaborate with interdisciplinary team   - Patient/family teaching  - Consider wound care consult   Outcome: Progressing     Problem: Risk for Self Injury/Neglect  Goal: Treatment Goal: Remain safe during length of stay, learn and adopt new coping skills, and be free of self-injurious ideation, impulses and acts at the time of discharge  Outcome: Progressing  Goal: Refrain from harming self  Description: Interventions:  - Monitor patient closely, per order  - Develop a trusting relationship  - Supervise medication ingestion, monitor effects and side effects   Outcome: Progressing     Problem: Depression  Goal: Refrain from harming self  Description: Interventions:  - Monitor patient closely, per order   - Supervise medication ingestion, monitor effects and side effects   Outcome: Progressing     Problem: SAFETY, RESTRAINT - VIOLENT/SELF-DESTRUCTIVE  Goal: Remains free of harm/injury from restraints (Restraint for Violent/Self-Destructive Behavior)  Description: INTERVENTIONS:  - Instruct patient/family regarding restraint use   - Assess and monitor physiologic and psychological status   - Provide interventions and comfort measures to meet assessed patient needs   - Ensure continuous in person monitoring is provided   - Identify and implement measures to help patient regain control  - Assess readiness for release of restraint  Outcome: Progressing  Goal: Returns to optimal restraint-free functioning  Description: INTERVENTIONS:  - Assess the patient's behavior and symptoms that indicate continued need for restraint  - Identify and implement measures to help patient regain control  - Assess readiness for release of restraint   Outcome: Progressing     Problem: DISCHARGE PLANNING - CARE MANAGEMENT  Goal: Discharge to post-acute care or home with appropriate resources  Description: INTERVENTIONS:  - Conduct assessment to determine patient/family and health care team treatment goals, and need for post-acute services based on payer coverage, community resources, and patient preferences, and barriers to discharge  - Address psychosocial, clinical, and financial barriers to discharge as identified in assessment in conjunction with the patient/family and health care team  - Arrange appropriate level of post-acute services according to patients   needs and preference and payer coverage in collaboration with the physician and health care team  - Communicate with and update the patient/family, physician, and health care team regarding progress on the discharge plan  - Arrange appropriate transportation to post-acute venues  Outcome: Progressing     Problem: Nutrition/Hydration-ADULT  Goal: Nutrient/Hydration intake appropriate for improving, restoring or maintaining nutritional needs  Description: Monitor and assess patient's nutrition/hydration status for malnutrition  Collaborate with interdisciplinary team and initiate plan and interventions as ordered  Monitor patient's weight and dietary intake as ordered or per policy  Utilize nutrition screening tool and intervene as necessary  Determine patient's food preferences and provide high-protein, high-caloric foods as appropriate       INTERVENTIONS:  - Monitor oral intake, urinary output, labs, and treatment plans  - Assess nutrition and hydration status and recommend course of action  - Evaluate amount of meals eaten  - Assist patient with eating if necessary   - Allow adequate time for meals  - Recommend/ encourage appropriate diets, oral nutritional supplements, and vitamin/mineral supplements  - Order, calculate, and assess calorie counts as needed  - Recommend, monitor, and adjust tube feedings and TPN/PPN based on assessed needs  - Assess need for intravenous fluids  - Provide specific nutrition/hydration education as appropriate  - Include patient/family/caregiver in decisions related to nutrition  Outcome: Not Progressing     Problem: Alteration in Thoughts and Perception  Goal: Treatment Goal: Gain control of psychotic behaviors/thinking, reduce/eliminate presenting symptoms and demonstrate improved reality functioning upon discharge  Outcome: Not Progressing  Goal: Verbalize thoughts and feelings  Description: Interventions:  - Promote a nonjudgmental and trusting relationship with the patient through active listening and therapeutic communication  - Assess patient's level of functioning, behavior and potential for risk  - Engage patient in 1 on 1 interactions  - Encourage patient to express fears, feelings, frustrations, and discuss symptoms    - [place] patient to reality, help patient recognize reality-based thinking   - Administer medications as ordered and assess for potential side effects  - Provide the patient education related to the signs and symptoms of the illness and desired effects of prescribed medications  Outcome: Not Progressing     Problem: Ineffective Coping  Goal: Identifies ineffective coping skills  Outcome: Not Progressing     Problem: Risk for Self Injury/Neglect  Goal: Verbalize thoughts and feelings  Description: Interventions:  - Assess and re-assess patient's lethality and potential for self-injury  - Engage patient in 1:1 interactions, daily, for a minimum of 15 minutes  - Encourage patient to express feelings, fears, frustrations, hopes  - Establish rapport/trust with patient   Outcome: Not Progressing     Problem: INVOLUNTARY ADMIT  Goal: Will cooperate with staff recommendations and doctor's orders and will demonstrate appropriate behavior  Description: INTERVENTIONS:  - Treat underlying conditions and offer medication as ordered  - Educate regarding involuntary admission procedures and rules  - Utilize positive consistent limit setting strategies to support patient and staff safety  Outcome: Not Progressing     Problem: Ineffective Coping  Goal: Participates in unit activities  Description: Interventions:  - Provide therapeutic environment   - Provide required programming   - Redirect inappropriate behaviors   Outcome: Not Progressing

## 2022-07-17 NOTE — PROGRESS NOTES
Progress Note - [address] [place] 49 y o  female MRN: [id number]  Unit/Bed#: -01 Encounter: [id number]       Patient was visited on unit for continuing care; chart reviewed and discussed with the nursing staff  Approached the patient several times in the morning, while was lying on the bed with CPAP on, and refused to participate in evaluation and did not answer any questions at the time  Later, the patient became angry and agitated while her CPAP machine was out of water around 10:30 AM  Staff tried to redirect the patient to eat her breakfast and be out of bed as she should not sleep during the day using the CPAP machine  The patient became reportedly angry and agitated, pulled the patient storage unit from the wall  Security was called and while waiting for PRN medication (Haldol 5 mg IM, Ativan 2 mg IM and Cogentin 1 mg IM PRN), I tried to evaluated the patient again  She was sitting on the bed, with security in the room, crying, avoiding eye contact, initially not answering any questions, and then with a lot of encouragement and support, stated that she does "not want to be around anymore" but did not elaborate further details  While was asked about having A/VH, she stated: "people think already that I'm crazy  That's why you put us in this unit and treat us like crazy people  Why should I answer those questions then?"  She reported having back pain which has has been better since her bed was modified, and thanked the writer for recommending to change the bed  She did not participate further in evaluation  The patient continues to make suicidal statements despite being self-perseverative and demanding at times  The patient required Haldol 5/Atvan 2 mg and Cogentin 1 mg IM PRN yesterday at 2:30 PM for agitation and aggressive behavior as well as this morning at 10:30 AM  Will remain on 1:1 for suicidality and unpredictable behavior       Patient accepted most of offered medications with a lot of reassurance and redirection  No adverse effects of medications noted or reported        Current Mental Status Evaluation:  Appearance and attitude: appeared as stated age, disheveled, dressed in hospital attire, overweight, with fair hygiene  Eye contact: poor and avoids  Motor Function: within normal limits, No PMA/PMR  Gait/station: Not observed  Speech: talking in low tone with decreased amount and selectively mute  Language: No overt abnormality  Mood/affect: depressed, dysphoric / Affect was tearful, hyper-intense, mood-congruent  Thought Processes: concrete, illogical  Thought content: denied suicidal ideations or homicidal ideations, paranoid ideation  Associations: concrete associations  Perceptual disturbances: denies Auditory/Visual/Tactile Hallucinations  Orientation: not cooperative  Cognitive Function: intact  Memory: not cooperative with formal MMSE  Intellect: unable to assess  Fund of knowledge: diminished  Impulse control: poor  Insight/judgment: poor/poor    Pain: reported having pain in lower back  Pain scale: did not score the pain, but reported better than yesterday    Vital signs in last 24 hours:    Temp:  [97 9 °F (36 6 °C)] 97 9 °F (36 6 °C)  HR:  [54-82] 56  Resp:  [16-18] 16  BP: (112-132)/(54-89) 112/54    Laboratory results: I have personally reviewed all pertinent laboratory/tests results    Results from the past 24 hours:   Recent Results (from the past 24 hour(s))   CBC and differential    Collection Time: 07/17/22  6:54 AM   Result Value Ref Range    WBC 7 82 4 31 - 10 16 Thousand/uL    RBC 5 04 3 81 - 5 12 Million/uL    Hemoglobin 15 4 11 5 - 15 4 g/dL    Hematocrit 46 2 (H) 34 8 - 46 1 %    MCV 92 82 - 98 fL    MCH 30 6 26 8 - 34 3 pg    MCHC 33 3 31 4 - 37 4 g/dL    RDW 12 8 11 6 - 15 1 %    MPV 10 9 8 9 - 12 7 fL    Platelets 212 997 - 208 Thousands/uL    nRBC 0 /100 WBCs    Neutrophils Relative 60 43 - 75 %    Immat GRANS % 1 0 - 2 %    Lymphocytes Relative 24 14 - 44 %    Monocytes Relative 14 (H) 4 - 12 %    Eosinophils Relative 0 0 - 6 %    Basophils Relative 1 0 - 1 %    Neutrophils Absolute 4 74 1 85 - 7 62 Thousands/µL    Immature Grans Absolute 0 06 0 00 - 0 20 Thousand/uL    Lymphocytes Absolute 1 89 0 60 - 4 47 Thousands/µL    Monocytes Absolute 1 06 0 17 - 1 22 Thousand/µL    Eosinophils Absolute 0 03 0 00 - 0 61 Thousand/µL    Basophils Absolute 0 04 0 00 - 0 10 Thousands/µL   Comprehensive metabolic panel    Collection Time: 07/17/22  6:54 AM   Result Value Ref Range    Sodium 140 137 - 147 mmol/L    Potassium 3 5 (L) 3 6 - 5 0 mmol/L    Chloride 108 97 - 108 mmol/L    CO2 25 22 - 30 mmol/L    ANION GAP 7 5 - 14 mmol/L    BUN 11 5 - 25 mg/dL    Creatinine 0 62 0 60 - 1 20 mg/dL    Glucose 104 (H) 70 - 99 mg/dL    Glucose, Fasting 104 (H) 70 - 99 mg/dL    Calcium 8 6 8 4 - 10 2 mg/dL    AST 29 14 - 36 U/L    ALT 29 <35 U/L    Alkaline Phosphatase 69 43 - 122 U/L    Total Protein 6 9 5 9 - 8 4 g/dL    Albumin 3 7 3 0 - 5 2 g/dL    Total Bilirubin 0 42 <1 30 mg/dL    eGFR 105 ml/min/1 73sq m   Lipid panel    Collection Time: 07/17/22  6:54 AM   Result Value Ref Range    Cholesterol 227 (H) See Comment mg/dL    Triglycerides 169 (H) See Comment mg/dL    HDL, Direct 33 (L) >=50 mg/dL    LDL Calculated 160 (H) <130 mg/dL    Non-HDL-Chol (CHOL-HDL) 194 mg/dl   TSH, 3rd generation    Collection Time: 07/17/22  6:54 AM   Result Value Ref Range    TSH 3RD GENERATON 2 070 0 450 - 4 500 uIU/mL   Valproic acid level, total    Collection Time: 07/17/22  6:54 AM   Result Value Ref Range    Valproic Acid, Total <10 0 (L) 50 - 120 ug/mL       Progress Toward Goals: no significant improvement    Assessment:  Principal Problem:    Schizoaffective disorder, bipolar type ([facility])  Active Problems:    Benign essential hypertension    Edema    Hypothyroidism    MAG (obstructive sleep apnea)    Overactive bladder    Marijuana abuse    COPD (chronic obstructive pulmonary disease) ([facility]) Class 3 severe obesity due to excess calories with serious comorbidity and body mass index (BMI) of 40 0 to 44 9 in adult [facility])    Medical clearance for psychiatric admission    GERD (gastroesophageal reflux disease)    Diarrhea    Borderline personality disorder ([facility] [address] )        Plan:  - f/u SLIM recs regarding the medical problems  - Continue medication titration and treatment plan; adjust medication to optimize treatment response and as clinically indicated       Scheduled medications:  Current Facility-Administered Medications   Medication Dose Route Frequency Provider Last Rate    albuterol  2 puff Inhalation Q6H PRN Gómez Lynn MD      aluminum-magnesium hydroxide-simethicone  30 mL Oral Q4H PRN Gómez Lynn MD      amLODIPine  5 mg Oral Daily Gómez MD Shane      artificial tear  1 application Both Eyes M8Z PRN Gómez Lynn MD      haloperidol lactate  2 5 mg Intramuscular Q6H PRN Max 4/day Gómez Lynn MD      And    LORazepam  1 mg Intramuscular Q6H PRN Max 4/day Gómez Lynn MD      And    benztropine  0 5 mg Intramuscular Q6H PRN Max 4/day Gómez Lynn MD      haloperidol lactate  5 mg Intramuscular Q4H PRN Max 4/day Gómez Lynn MD      And    LORazepam  2 mg Intramuscular Q4H PRN Max 4/day Gómez Lynn MD      And    benztropine  1 mg Intramuscular Q4H PRN Max 4/day Gómez Lynn MD      benztropine  1 mg Intramuscular Q4H PRN Max 6/day Gómez MD Shane      benztropine  1 mg Oral Q4H PRN Max 6/day Gómez MD Shane      buPROPion  150 mg Oral Daily Ramesh Foote MD      cholecalciferol  5,000 Units Oral Daily Gómez Lynn MD      colestipol  1 g Oral BID Shira Moffett PA-C      estradiol  0 5 mg Oral Daily Gómez Lynn MD      fluticasone  2 puff Inhalation BID Shira Moffett PA-C      furosemide  20 mg Oral Daily Gómez Lynn MD      haloperidol 2 mg Oral Q4H PRN Max 6/day Cindi Cavanaugh MD      haloperidol  5 mg Oral Q6H PRN Max 4/day Cindi Cavanaugh MD      haloperidol  5 mg Oral Q4H PRN Max 4/day Cindi Cavanaugh MD      hydrOXYzine HCL  25 mg Oral Q6H PRN Max 4/day Cindi Cavanaugh MD      hydrOXYzine HCL  50 mg Oral Q4H PRN Max 4/day Cindi Cavanaugh MD      Or    LORazepam  1 mg Intramuscular Q4H PRN Cindi Cavanaugh MD      ibuprofen  400 mg Oral Q4H PRN Cindi Cavanaugh MD      ibuprofen  600 mg Oral Q6H PRN Cindi Cavanaugh MD      ibuprofen  800 mg Oral Q8H PRN Cindi Cavanaugh MD      ketotifen  1 drop Both Eyes BID Cindi Cavanaugh MD      levothyroxine  100 mcg Oral Early Morning Cindi Cavanaugh MD      loperamide  2 mg Oral TID PRN Cindi Cavanaugh MD      loratadine  10 mg Oral Daily Cindi Cavanaugh MD      LORazepam  1 mg Oral Q4H PRN Max 6/day Cindi Cavanaugh MD      Or    LORazepam  2 mg Intramuscular Q6H PRN Max 3/day Cindi Cavanaugh MD      losartan  50 mg Oral Daily Cindi Cavanaugh MD      lurasidone  20 mg Oral Daily With Breakfast Cindi Cavanaugh MD      lurasidone  60 mg Oral Daily With Marii Buckley MD      melatonin  3 mg Oral HS PRN Cindi Cavanaugh MD      metoprolol tartrate  25 mg Oral Q12H [address] Cindi Cavanaugh MD      oxybutynin  5 mg Oral Daily Cindi Cavanaugh MD      pantoprazole  40 mg Oral BID [facility] Cindi Cavanaugh MD      pilocarpine  5 mg Oral TID Cindi Cavanaugh MD      polyethylene glycol  17 g Oral Daily PRN Cindi Cavanaugh MD      propranolol  10 mg Oral Q8H PRN Cindi Cavanaugh MD      rOPINIRole  0 5 mg Oral HS Cindi Cavanaugh MD      senna-docusate sodium  1 tablet Oral Daily PRN Cindi Cavanaugh MD      topiramate  100 mg Oral BID Cindi Cavanaugh MD          PRN:  [facility]  albuterol    aluminum-magnesium hydroxide-simethicone   artificial tear    haloperidol lactate **AND** LORazepam **AND** benztropine    haloperidol lactate **AND** LORazepam **AND** benztropine    benztropine    benztropine    haloperidol    haloperidol    haloperidol    hydrOXYzine HCL    hydrOXYzine HCL **OR** LORazepam    ibuprofen    ibuprofen    ibuprofen    loperamide    LORazepam **OR** LORazepam    melatonin    polyethylene glycol    propranolol    senna-docusate sodium    - Observation: 1:1 for suicidality and UPB  - VS: as per unit protocol  - Diet: Regular diet  - Psychoeducation (benefits and potential risks) discussed, importance of compliance with the psychiatric treatment reiterated, and the patient verbalized understanding of the matter  - Encourage group attendance and milieu therapy    - The pt was educated and agreed to verbalize any suicidal thoughts, frustrations or concerns to the nursing staff, immediately  - Dispo: TBD       Next of Kin  · Extended Emergency Contact Information  · Primary Emergency Contact: Leonor Ji  · Address: [address] [place] [address]  ·          [facility], [address] [place] [address]  · Home Phone: [phone]  · Mobile Phone: [phone]  · Relation: Friend      Counseling / Coordination of Care     Total floor / unit time spent today 20 minutes  Greater than 50% of total time was spent with the patient and / or family counseling and / or coordination of care  A description of the counseling / coordination of care  Patient's Rights, confidentiality and exceptions to confidentiality, use of automated medical record, [address] [place] [address] staff access to medical record, and consent to treatment reviewed      Art Ramirez MD  Attending P O  Ellen [phone]

## 2022-07-17 NOTE — TELEPHONE ENCOUNTER
Athough I am pretty confident that this is not the case, I am happy to do that    Would you mind drafting one up stating that she uses CPAP and I will sign it

## 2022-07-17 NOTE — NURSING NOTE
Patient was maintained on 1:1 throughout the shift  Upon approach, patient was sitting on the floor facing the wall  Refused all care initially  Patient reported she feels "No one cares if I eat or drink anything  Let me starve and dehydrate " In regards to her medications, she first stated "I'm not taking anything "     Patient expressed that she is not comfortable and cannot sleep flat  This writer found foam wedges to assist with propping  Patient was pleased and then agreed to food, drink, and medications  Staff assisted her off the floor  Patient denied any unmet needs  Will continue to monitor and support

## 2022-07-18 ENCOUNTER — TELEPHONE (OUTPATIENT)
Dept: SLEEP CENTER | Facility: CLINIC | Age: 52
End: 2022-07-18

## 2022-07-18 PROCEDURE — 99232 SBSQ HOSP IP/OBS MODERATE 35: CPT | Performed by: STUDENT IN AN ORGANIZED HEALTH CARE EDUCATION/TRAINING PROGRAM

## 2022-07-18 RX ORDER — HALOPERIDOL 5 MG/ML
2.5 INJECTION INTRAMUSCULAR
Status: DISCONTINUED | OUTPATIENT
Start: 2022-07-18 | End: 2022-07-26 | Stop reason: HOSPADM

## 2022-07-18 RX ORDER — CHLORPROMAZINE HYDROCHLORIDE 25 MG/ML
50 INJECTION INTRAMUSCULAR
Status: DISCONTINUED | OUTPATIENT
Start: 2022-07-18 | End: 2022-07-19

## 2022-07-18 RX ORDER — BENZTROPINE MESYLATE 1 MG/ML
0.5 INJECTION INTRAMUSCULAR; INTRAVENOUS
Status: DISCONTINUED | OUTPATIENT
Start: 2022-07-18 | End: 2022-07-26 | Stop reason: HOSPADM

## 2022-07-18 RX ORDER — DIPHENHYDRAMINE HYDROCHLORIDE 50 MG/ML
50 INJECTION INTRAMUSCULAR; INTRAVENOUS
Status: DISCONTINUED | OUTPATIENT
Start: 2022-07-18 | End: 2022-07-19

## 2022-07-18 RX ORDER — LORAZEPAM 2 MG/ML
2 INJECTION INTRAMUSCULAR
Status: DISCONTINUED | OUTPATIENT
Start: 2022-07-18 | End: 2022-07-26 | Stop reason: HOSPADM

## 2022-07-18 RX ADMIN — MONTELUKAST SODIUM 1 G: 4 TABLET, CHEWABLE ORAL at 08:32

## 2022-07-18 RX ADMIN — Medication 5000 UNITS: at 08:30

## 2022-07-18 RX ADMIN — TOPIRAMATE 100 MG: 100 TABLET, FILM COATED ORAL at 08:39

## 2022-07-18 RX ADMIN — BENZTROPINE MESYLATE 1 MG: 1 INJECTION INTRAMUSCULAR; INTRAVENOUS at 09:09

## 2022-07-18 RX ADMIN — BUPROPION HYDROCHLORIDE 150 MG: 150 TABLET, FILM COATED, EXTENDED RELEASE ORAL at 08:31

## 2022-07-18 RX ADMIN — PILOCARPINE HYDRCHLORIDE 5 MG: 5 TABLET, FILM COATED ORAL at 08:32

## 2022-07-18 RX ADMIN — DIPHENHYDRAMINE HYDROCHLORIDE 50 MG: 50 INJECTION, SOLUTION INTRAMUSCULAR; INTRAVENOUS at 16:14

## 2022-07-18 RX ADMIN — CHLORPROMAZINE HYDROCHLORIDE 50 MG: 25 INJECTION INTRAMUSCULAR at 16:13

## 2022-07-18 RX ADMIN — LORAZEPAM 2 MG: 2 INJECTION INTRAMUSCULAR; INTRAVENOUS at 13:59

## 2022-07-18 RX ADMIN — LORAZEPAM 1 MG: 2 INJECTION INTRAMUSCULAR; INTRAVENOUS at 09:08

## 2022-07-18 RX ADMIN — FUROSEMIDE 20 MG: 20 TABLET ORAL at 08:31

## 2022-07-18 RX ADMIN — ESTRADIOL 0.5 MG: 1 TABLET ORAL at 08:31

## 2022-07-18 RX ADMIN — METOPROLOL TARTRATE 25 MG: 25 TABLET, FILM COATED ORAL at 22:21

## 2022-07-18 RX ADMIN — OXYBUTYNIN CHLORIDE 5 MG: 5 TABLET, EXTENDED RELEASE ORAL at 08:30

## 2022-07-18 RX ADMIN — LURASIDONE HYDROCHLORIDE 20 MG: 20 TABLET, FILM COATED ORAL at 08:31

## 2022-07-18 RX ADMIN — AMLODIPINE BESYLATE 5 MG: 5 TABLET ORAL at 08:31

## 2022-07-18 RX ADMIN — HALOPERIDOL LACTATE 5 MG: 5 INJECTION, SOLUTION INTRAMUSCULAR at 14:00

## 2022-07-18 RX ADMIN — ROPINIROLE HYDROCHLORIDE 0.5 MG: 0.5 TABLET, FILM COATED ORAL at 22:20

## 2022-07-18 RX ADMIN — LURASIDONE HYDROCHLORIDE 60 MG: 40 TABLET, FILM COATED ORAL at 22:20

## 2022-07-18 RX ADMIN — PILOCARPINE HYDRCHLORIDE 5 MG: 5 TABLET, FILM COATED ORAL at 22:29

## 2022-07-18 RX ADMIN — HALOPERIDOL LACTATE 5 MG: 5 INJECTION, SOLUTION INTRAMUSCULAR at 09:08

## 2022-07-18 RX ADMIN — LOSARTAN POTASSIUM 50 MG: 50 TABLET, FILM COATED ORAL at 08:31

## 2022-07-18 RX ADMIN — METOPROLOL TARTRATE 25 MG: 25 TABLET, FILM COATED ORAL at 08:31

## 2022-07-18 RX ADMIN — POLYETHYLENE GLYCOL 3350 17 G: 17 POWDER, FOR SOLUTION ORAL at 09:33

## 2022-07-18 RX ADMIN — LORATADINE 10 MG: 10 TABLET ORAL at 08:31

## 2022-07-18 RX ADMIN — MONTELUKAST SODIUM 1 G: 4 TABLET, CHEWABLE ORAL at 22:27

## 2022-07-18 RX ADMIN — FLUTICASONE PROPIONATE 2 PUFF: 110 AEROSOL, METERED RESPIRATORY (INHALATION) at 22:27

## 2022-07-18 NOTE — PLAN OF CARE
Problem: SAFETY, RESTRAINT - VIOLENT/SELF-DESTRUCTIVE  Goal: Remains free of harm/injury from restraints (Restraint for Violent/Self-Destructive Behavior)  Description: INTERVENTIONS:  - Instruct patient/family regarding restraint use   - Assess and monitor physiologic and psychological status   - Provide interventions and comfort measures to meet assessed patient needs   - Ensure continuous in person monitoring is provided   - Identify and implement measures to help patient regain control  - Assess readiness for release of restraint  Outcome: Not Progressing  Goal: Returns to optimal restraint-free functioning  Description: INTERVENTIONS:  - Assess the patient's behavior and symptoms that indicate continued need for restraint  - Identify and implement measures to help patient regain control  - Assess readiness for release of restraint   Outcome: Not Progressing     Problem: Ineffective Coping  Goal: Participates in unit activities  Description: Interventions:  - Provide therapeutic environment   - Provide required programming   - Redirect inappropriate behaviors   Outcome: Not Progressing

## 2022-07-18 NOTE — NURSING NOTE
Patient remains agitated, pulling at restraints  Patient refused 1800 scheduled medications  Patient could not contract for safety  Doctor was contacted for a restraint order and obtained  1:1 observation will be maintained

## 2022-07-18 NOTE — PLAN OF CARE
Problem: SAFETY, RESTRAINT - VIOLENT/SELF-DESTRUCTIVE  Goal: Remains free of harm/injury from restraints (Restraint for Violent/Self-Destructive Behavior)  Description: INTERVENTIONS:  - Instruct patient/family regarding restraint use   - Assess and monitor physiologic and psychological status   - Provide interventions and comfort measures to meet assessed patient needs   - Ensure continuous in person monitoring is provided   - Identify and implement measures to help patient regain control  - Assess readiness for release of restraint  7/18/2022 1408 by Garcia Bower RN  Outcome: Not Progressing  7/18/2022 0954 by Garcia Bower RN  Outcome: Not Progressing  7/18/2022 0747 by Garcia Bower RN  Outcome: Progressing  Goal: Returns to optimal restraint-free functioning  Description: INTERVENTIONS:  - Assess the patient's behavior and symptoms that indicate continued need for restraint  - Identify and implement measures to help patient regain control  - Assess readiness for release of restraint   7/18/2022 1408 by Garcia Bower RN  Outcome: Not Progressing  7/18/2022 0954 by Garcia Bower RN  Outcome: Not Progressing  7/18/2022 0747 by Garcia Bower RN  Outcome: Progressing

## 2022-07-18 NOTE — NURSING NOTE
1:1 observations maintained  Patient remained isolative to room and bed throughout the evening  Upon approach, patient was flat and guarded  Focused on getting belongings back from SL/And  Patient updated on status  HS metoprolol held due to BP  Compliant with rest  Patient given Motrin 800 mg at 2109 for reported 10/10 back and chest pain  Effective when reassessed at 2210  Will monitor  Ftsg Text: The defect edges were debeveled with a #15 scalpel blade.  Given the location of the defect, shape of the defect and the proximity to free margins a full thickness skin graft was deemed most appropriate.  Using a sterile surgical marker, the primary defect shape was transferred to the donor site. The area thus outlined was incised deep to adipose tissue with a #15 scalpel blade.  The harvested graft was then trimmed of adipose tissue until only dermis and epidermis was left.  The skin margins of the secondary defect were undermined to an appropriate distance in all directions utilizing iris scissors.  The secondary defect was closed with interrupted buried subcutaneous sutures.  The skin edges were then re-apposed with running  sutures.  The skin graft was then placed in the primary defect and oriented appropriately.

## 2022-07-18 NOTE — NURSING NOTE
Patient remains in 4 point restraints  Patient started to be verbally aggressive  demanding to make a phone call  Patient started yelling and cursing at staff  Patient attempting to roll self off restraint board and slip hands out of restraints  Staff attempted to adjust patient even in the bed and then patient attempted to hit and grab at staff threatening them  The restraint board was moved to the ground  IM thorazine 50mg and IM benadryl 50mg was administered  1:1 observation remains in effect

## 2022-07-18 NOTE — NURSING NOTE
Patient began throwing cup of Miralax and apple juice and banging window and wall  Not able to be redirected  Attempted multiple times  Patient crying uncontrollably and continued to be a harm to self by banging window and wall  Dr Laurie Hill was present and verbally ordered 4 point restraints  Security called and 4 point restraints applied without incident  IM medications had been given 30 minutes prior so no medications could be administered at this point  IM medications not effective  1:1 continues  Will continue to monitor

## 2022-07-18 NOTE — RESPIRATORY THERAPY NOTE
Restraint Face to Face   Edis Lujan 49 y o  female MRN: [id number]  Unit/Bed#: [facility] 344-01 Encounter: [id number]      Physical Evaluation: Agitated, physically aggressive, crying  Purpose for Restraints/ Seclusion:High risk for self harm, High risk for causing significant disruption of treatment environment  and High risk for harm to others  Patient's reaction to the intervention: uncooperative  Patient's medical condition: stable  Patient's Behavioral condition: agitated/uncooperative, throwing juice, punching wall and window  Restraints to be Continued

## 2022-07-18 NOTE — NURSING NOTE
Patient was asleep and restraints removed at 1038  As of 5865, patient is awake and crying  Will continue to monitor  1:1 continues

## 2022-07-18 NOTE — PLAN OF CARE
Problem: SAFETY, RESTRAINT - VIOLENT/SELF-DESTRUCTIVE  Goal: Remains free of harm/injury from restraints (Restraint for Violent/Self-Destructive Behavior)  Description: INTERVENTIONS:  - Instruct patient/family regarding restraint use   - Assess and monitor physiologic and psychological status   - Provide interventions and comfort measures to meet assessed patient needs   - Ensure continuous in person monitoring is provided   - Identify and implement measures to help patient regain control  - Assess readiness for release of restraint  7/18/2022 0954 by Disha Peter RN  Outcome: Not Progressing  7/18/2022 0747 by Disha Peter RN  Outcome: Progressing  Goal: Returns to optimal restraint-free functioning  Description: INTERVENTIONS:  - Assess the patient's behavior and symptoms that indicate continued need for restraint  - Identify and implement measures to help patient regain control  - Assess readiness for release of restraint   7/18/2022 0954 by Disha Peter RN  Outcome: Not Progressing  7/18/2022 0747 by Disha Peter RN  Outcome: Progressing

## 2022-07-18 NOTE — PROGRESS NOTES
07/18/22 9145   Team Meeting   Meeting Type Daily Rounds   Team Members Present   Team Members Present Physician;Nurse;   Physician Team Member Dr Rob Cordova Team Member [facility] Management Team Member Sunitha   Patient/Family Present   Patient Present No   Patient's Family Present No   Pt is a new admit here on a 201 from [facility] following a SA by OD  Pt at times aggressive, required 4 point restraints after kicking a nurse and throwing items  Pt's mood is labile and pt is often observed crying  Readmit score is 23  Discharge to be determined

## 2022-07-18 NOTE — PROGRESS NOTES
07/18/22 1339   Team Meeting   Meeting Type Tx Team Meeting   Initial Conference Date 07/18/22   Team Members Present   Team Members Present Physician;Nurse;   Physician Team Member Dr Elsy Huerta Team Member [facility] Management Team Member Sunitha   Patient/Family Present   Patient Present Yes   Patient's Family Present No   Attempted to review Tx plan with Pt  Pt did not wish to engage in conversation regarding Tx plan  Medications were discussed with Pt and Pt was encouraged to attend groups  Pt did not sign Tx plan

## 2022-07-18 NOTE — NURSING NOTE
Restraint Face to Face   Josh Sims 49 y o  female MRN: [id number]  Unit/Bed#: [facility] 344-01 Encounter: [id number]        Physical Evaluation: Agitated, physically aggressive, crying  Purpose for Restraints/ Seclusion:High risk for self harm, High risk for causing significant disruption of treatment environment  and High risk for harm to others  Patient's reaction to the intervention: uncooperative  Patient's medical condition: stable  Patient's Behavioral condition: agitated/uncooperative, throwing juice, punching wall and window  Restraints to be Continued

## 2022-07-18 NOTE — NURSING NOTE
Patient observed kicking mattress and hanging head off mattress  Began picking at sutures in left hand stating "I'm going to rip the sutures out"  Walked to corner in bedroom to rip sutures out of hand  Placed back in 4 point restraints because she was unable to be redirected verbally  PRN IM ativan 2mg and IM haldol 5mg given for severe agitation in bilateral deltoids

## 2022-07-18 NOTE — PLAN OF CARE
Problem: Prexisting or High Potential for Compromised Skin Integrity  Goal: Skin integrity is maintained or improved  Description: INTERVENTIONS:  - Identify patients at risk for skin breakdown  - Assess and monitor skin integrity  - Assess and monitor nutrition and hydration status  - Monitor labs   - Assess for incontinence   - Turn and reposition patient  - Assist with mobility/ambulation  - Relieve pressure over bony prominences  - Avoid friction and shearing  - Provide appropriate hygiene as needed including keeping skin clean and dry  - Evaluate need for skin moisturizer/barrier cream  - Collaborate with interdisciplinary team   - Patient/family teaching  - Consider wound care consult   Outcome: Progressing     Problem: Nutrition/Hydration-ADULT  Goal: Nutrient/Hydration intake appropriate for improving, restoring or maintaining nutritional needs  Description: Monitor and assess patient's nutrition/hydration status for malnutrition  Collaborate with interdisciplinary team and initiate plan and interventions as ordered  Monitor patient's weight and dietary intake as ordered or per policy  Utilize nutrition screening tool and intervene as necessary  Determine patient's food preferences and provide high-protein, high-caloric foods as appropriate       INTERVENTIONS:  - Monitor oral intake, urinary output, labs, and treatment plans  - Assess nutrition and hydration status and recommend course of action  - Evaluate amount of meals eaten  - Assist patient with eating if necessary   - Allow adequate time for meals  - Recommend/ encourage appropriate diets, oral nutritional supplements, and vitamin/mineral supplements  - Order, calculate, and assess calorie counts as needed  - Recommend, monitor, and adjust tube feedings and TPN/PPN based on assessed needs  - Assess need for intravenous fluids  - Provide specific nutrition/hydration education as appropriate  - Include patient/family/caregiver in decisions related to nutrition  Outcome: Progressing     Problem: Alteration in Thoughts and Perception  Goal: Treatment Goal: Gain control of psychotic behaviors/thinking, reduce/eliminate presenting symptoms and demonstrate improved reality functioning upon discharge  Outcome: Progressing  Goal: Verbalize thoughts and feelings  Description: Interventions:  - Promote a nonjudgmental and trusting relationship with the patient through active listening and therapeutic communication  - Assess patient's level of functioning, behavior and potential for risk  - Engage patient in 1 on 1 interactions  - Encourage patient to express fears, feelings, frustrations, and discuss symptoms    - [place] patient to reality, help patient recognize reality-based thinking   - Administer medications as ordered and assess for potential side effects  - Provide the patient education related to the signs and symptoms of the illness and desired effects of prescribed medications  Outcome: Progressing     Problem: Ineffective Coping  Goal: Identifies ineffective coping skills  Outcome: Not Progressing     Problem: Risk for Self Injury/Neglect  Goal: Treatment Goal: Remain safe during length of stay, learn and adopt new coping skills, and be free of self-injurious ideation, impulses and acts at the time of discharge  Outcome: Progressing  Goal: Verbalize thoughts and feelings  Description: Interventions:  - Assess and re-assess patient's lethality and potential for self-injury  - Engage patient in 1:1 interactions, daily, for a minimum of 15 minutes  - Encourage patient to express feelings, fears, frustrations, hopes  - Establish rapport/trust with patient   Outcome: Progressing  Goal: Refrain from harming self  Description: Interventions:  - Monitor patient closely, per order  - Develop a trusting relationship  - Supervise medication ingestion, monitor effects and side effects   Outcome: Progressing     Problem: Depression  Goal: Refrain from harming self  Description: Interventions:  - Monitor patient closely, per order   - Supervise medication ingestion, monitor effects and side effects   Outcome: Progressing     Problem: SAFETY, RESTRAINT - VIOLENT/SELF-DESTRUCTIVE  Goal: Remains free of harm/injury from restraints (Restraint for Violent/Self-Destructive Behavior)  Description: INTERVENTIONS:  - Instruct patient/family regarding restraint use   - Assess and monitor physiologic and psychological status   - Provide interventions and comfort measures to meet assessed patient needs   - Ensure continuous in person monitoring is provided   - Identify and implement measures to help patient regain control  - Assess readiness for release of restraint  Outcome: Progressing  Goal: Returns to optimal restraint-free functioning  Description: INTERVENTIONS:  - Assess the patient's behavior and symptoms that indicate continued need for restraint  - Identify and implement measures to help patient regain control  - Assess readiness for release of restraint   Outcome: Progressing     Problem: INVOLUNTARY ADMIT  Goal: Will cooperate with staff recommendations and doctor's orders and will demonstrate appropriate behavior  Description: INTERVENTIONS:  - Treat underlying conditions and offer medication as ordered  - Educate regarding involuntary admission procedures and rules  - Utilize positive consistent limit setting strategies to support patient and staff safety  Outcome: Progressing

## 2022-07-18 NOTE — NURSING NOTE
Pt stated that when she was at [address] she had 3 bags of clothing and when she was transported to this unit only 1 bag came with her  Sandre Coats stated that she was told that he bags were going to be delivered yesterday  This writer reached out to supervisor at [employer]  Supervisor stated she told the charge nurse on that unit about the missing bags and that the charge nurse will be following up on items  Pt advised of this

## 2022-07-18 NOTE — RESTRAINT FACE TO FACE
Restraint Face to Face   Hussain Johnson 49 y o  female MRN: [id number]  Unit/Bed#: [facility] 344-01 Encounter: [id number]      Physical Evaluation: the patient was visited at the bedside  Purpose for Restraints/ Seclusion High risk for self harm and High risk for causing significant disruption of treatment environment   Patient's reaction to the intervention: required 4 point restraint to prevent self-destructive behavior  Patient's medical condition: not in acute distress ; VSS  Patient's Behavioral condition: The patient received Haldol 5 mg, Ativan 1 mg and Cogentin 1 mg IM at 9:08 for irritability and agitation after was reportedly aggressive, throwing things in her room after came back from breakfast  The patient was visited at the bedside later, when she was still irritable, confrontational and agitated and not cooperative answering questions  The patient started banging the wall and punching the window in her room after the writer left her room (see the progress note), not verbally redirectable, yelling and agitated and required 4 point restraint at 9:43 AM, and will remain on 1:1 for safety and unpredictable behavior     Restraints to be Continued

## 2022-07-18 NOTE — PROGRESS NOTES
Progress Note - [id number] [place] 49 y o  female MRN: [id number]  Unit/Bed#: [facility] 344-01 Encounter: [id number]       Patient was visited on unit for continuing care; chart reviewed and discussed with the nursing staff  On approach, the patient was irritable and not cooperative with answering the questions  Supportive psychotherapy provided and patient's emotions were acknowledged related to loss of her mother as well as her ACT  reportedly leaving  The patient stated that nobody notified the ACT team, and as I explained that reportedly her ACT team was notified when she was in the ED and the  continues to keep them updated, she stated that it is not what she heard, and then stated: "You think I'm a liar  Everone thinks I am a liar", and then became more agitated and irritable, stated: "You're a jerk  You think that I am crazy  Nobody listens to me  I am not going to answer any other questions" and terminated the interview  Shortly after I left the room, the patient escalated, started yelling, and punching on the wall and window, and not verbally redirectable by staff, and required 4 point restraint at 9:43 AM which was removed at 10:38 as the patient responded to the PRN meds  Of note, the patient was agitated and aggressive earlier as she came back from breakfast, started  Throwing items in her room and reportedly kicked the staff, and received Haldol 5 mg, Ativan 1 mg and Cogentin 1 mg IM at 9:08  Remains on 1:1 for safety  As per Shelby Jefferson, 's note: "Khloe from  ACT called to provide history on Pt  She has a history of substance use  She had a meeting last week with ACT team and [employer] by phone to discuss if Pt needs a higher LOC  ACT advised within 2 hours of hanging up the phone call she OD'd on her depakote  ACT advised Pt is very difficult to work with and that Pt states she is always being treated less than others   ACT advised during meeting with [employer], one of the higher LOC's recommended was PHP "    Patient accepted all offered medications  No adverse effects of medications noted or reported  Latuda 20/60 mg increased to 40/60 mg daily; other meds maintained the same dose  Current Mental Status Evaluation:  Appearance and attitude: appeared as stated age, dressed in hospital attire, overweight, with fair hygiene  Eye contact: poor  Motor Function: PMA  Gait/station: normal gait/station and normal balance  Speech: talking loud  Language: No overt abnormality  Mood/affect: dysphoric / Affect was tearful, hyper-intense, mood-congruent  Thought Processes: illogical, perseverative  Thought content: paranoid ideation, ruminations, not cooperative with safety assessment  Associations: perseverative  Perceptual disturbances: not cooperative  Orientation: not cooperative  Cognitive Function: intact  Memory: not cooperative with formal MMSE  Intellect: unable to assess  Fund of knowledge: aware of current events, aware of past history and vocabulary average  Impulse control: poor  Insight/judgment: poor/impaired    Vital signs in last 24 hours:    Temp:  [96 4 °F (35 8 °C)] 96 4 °F (35 8 °C)  HR:  [56-78] 78  Resp:  [16-18] 18  BP: ()/(54-67) 131/67    Laboratory results: I have personally reviewed all pertinent laboratory/tests results    Results from the past 24 hours: No results found for this or any previous visit (from the past 24 hour(s))      Progress Toward Goals: no improvement    Assessment:  Principal Problem:    Schizoaffective disorder, bipolar type ([facility])  Active Problems:    Benign essential hypertension    Edema    Hypothyroidism    MAG (obstructive sleep apnea)    Overactive bladder    Marijuana abuse    COPD (chronic obstructive pulmonary disease) (formerly [facility])    Class 3 severe obesity due to excess calories with serious comorbidity and body mass index (BMI) of 40 0 to 44 9 in [facility])    Medical clearance for psychiatric admission    GERD (gastroesophageal reflux disease)    Diarrhea    Borderline personality disorder ([facility] [address] )        Plan:  - f/u SLIM recs regarding the medical problems  - Continue medication titration and treatment plan; adjust medication to optimize treatment response and as clinically indicated       Scheduled medications:  Current Facility-Administered Medications   Medication Dose Route Frequency Provider Last Rate    albuterol  2 puff Inhalation Q6H PRN Ahsan Ventura MD      aluminum-magnesium hydroxide-simethicone  30 mL Oral Q4H PRN Ahsan Ventura MD      amLODIPine  5 mg Oral Daily Ahsan Ventura MD      artificial tear  1 application Both Eyes A0Q PRN Ahsan Ventura MD      LORazepam  2 mg Intramuscular Q6H PRN Max 4/day Sarah Branham MD      And    haloperidol lactate  2 5 mg Intramuscular Q6H PRN Max 4/day Sarah Branham MD      And    benztropine  0 5 mg Intramuscular Q6H PRN Max 4/day Sarah Branham MD      haloperidol lactate  5 mg Intramuscular Q4H PRN Max 4/day Ahsan Ventura MD      And    LORazepam  2 mg Intramuscular Q4H PRN Max 4/day Ahsan Ventura MD      And    benztropine  1 mg Intramuscular Q4H PRN Max 4/day Ahsan Ventura MD      benztropine  1 mg Intramuscular Q4H PRN Max 6/day Ahsan Ventura MD      benztropine  1 mg Oral Q4H PRN Max 6/day Ahsan Ventura MD      buPROPion  150 mg Oral Daily Sarah Branham MD      cholecalciferol  5,000 Units Oral Daily Ahsan Ventura MD      colestipol  1 g Oral BID Polo Kaye PA-C      estradiol  0 5 mg Oral Daily Ahsan Ventura MD      fluticasone  2 puff Inhalation BID Polo Kaye PA-C      furosemide  20 mg Oral Daily Ahsan Ventura MD      haloperidol  2 mg Oral Q4H PRN Max 6/day Ahsan Ventura MD      haloperidol  5 mg Oral Q6H PRN Max 4/day Ahsan Ventura MD      haloperidol  5 mg Oral Q4H PRN Max 4/day Lenny Arita Lise Ramirez, MD      hydrOXYzine HCL  25 mg Oral Q6H PRN Max 4/day Mateo Sher MD      hydrOXYzine HCL  50 mg Oral Q4H PRN Max 4/day Mateo Sher MD      Or    LORazepam  1 mg Intramuscular Q4H PRN Mateo Sher MD      ibuprofen  400 mg Oral Q4H PRN Mateo Sher MD      ibuprofen  600 mg Oral Q6H PRN Mateo Sher MD      ibuprofen  800 mg Oral Q8H PRN Mateo Sher MD      ketotifen  1 drop Both Eyes BID Mateo Sher MD      levothyroxine  100 mcg Oral Early Morning Mateo Sher MD      loperamide  2 mg Oral TID PRN Mateo Sher MD      loratadine  10 mg Oral Daily Mateo Sher MD      LORazepam  1 mg Oral Q4H PRN Max 6/day Mateo Sher MD      Or    LORazepam  2 mg Intramuscular Q6H PRN Max 3/day Mateo Sher MD      losartan  50 mg Oral Daily Mateo Sher MD      [START ON 7/19/2022] lurasidone  40 mg Oral Daily With Breakfast Dottie Douglass MD      lurasidone  60 mg Oral Daily With Andrew Grande MD      melatonin  3 mg Oral HS PRN Mateo Sher MD      metoprolol tartrate  25 mg Oral Q12H [address] Mateo Sher MD      oxybutynin  5 mg Oral Daily Mateo Sher MD      pantoprazole  40 mg Oral BID AC Mateo Sher MD      pilocarpine  5 mg Oral TID Mateo Sher MD      polyethylene glycol  17 g Oral Daily PRN Mateo Sher MD      propranolol  10 mg Oral Q8H PRN Mateo Sher MD      rOPINIRole  0 5 mg Oral HS Mateo Sher MD      senna-docusate sodium  1 tablet Oral Daily PRN Mateo Sher MD      topiramate  100 mg Oral BID Mateo Sher MD          PRN:  Estephanie Clunes  albuterol    aluminum-magnesium hydroxide-simethicone    artificial tear    haloperidol lactate **AND** LORazepam **AND** benztropine    haloperidol lactate **AND** LORazepam **AND** benztropine    benztropine    benztropine   haloperidol    haloperidol    haloperidol    hydrOXYzine HCL    hydrOXYzine HCL **OR** LORazepam    ibuprofen    ibuprofen    ibuprofen    loperamide    LORazepam **OR** LORazepam    melatonin    polyethylene glycol    propranolol    senna-docusate sodium    - Observation: 1:1 for safety    - VS: as per unit protocol  - Diet: Regular diet  - Psychoeducation (benefits and potential risks) discussed, importance of compliance with the psychiatric treatment reiterated, and the patient verbalized understanding of the matter  - Encourage group attendance and milieu therapy    - The pt was educated and agreed to verbalize any suicidal thoughts, frustrations or concerns to the nursing staff, immediately  - Dispo: TBD       Next of Kin  Extended Emergency Contact Information  Primary Emergency Contact: Leonor Ji  Address: [address] Phone: [phone]  Mobile Phone: [phone]  Relation: Friend      Counseling / Coordination of Care     Total floor / unit time spent today 20 minutes  Greater than 50% of total time was spent with the patient and / or family counseling and / or coordination of care  A description of the counseling / coordination of care  Patient's Rights, confidentiality and exceptions to confidentiality, use of automated medical record, [facility] staff access to medical record, and consent to treatment reviewed      Mary Kay Pa MD  Attending [phone]

## 2022-07-18 NOTE — SOCIAL WORK
Khloe from  ACT called to provide history on Pt  She has a history of substance use  She had a meeting last week with ACT team and [employer] by phone to discuss if Pt needs a higher LOC  ACT advised within 2 hours of hanging up the phone call she OD'd on her depakote  ACT advised Pt is very difficult to work with and that Pt states she is always being treated less than others  ACT advised during meeting with [facility], one of the higher LOC's recommended was PHP
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
## 2022-07-18 NOTE — PLAN OF CARE
Problem: SAFETY, RESTRAINT - VIOLENT/SELF-DESTRUCTIVE  Goal: Remains free of harm/injury from restraints (Restraint for Violent/Self-Destructive Behavior)  Description: INTERVENTIONS:  - Instruct patient/family regarding restraint use   - Assess and monitor physiologic and psychological status   - Provide interventions and comfort measures to meet assessed patient needs   - Ensure continuous in person monitoring is provided   - Identify and implement measures to help patient regain control  - Assess readiness for release of restraint  7/18/2022 1917 by Jose Brewster RN  Outcome: Not Progressing  7/18/2022 1916 by Jose Brewster RN  Outcome: Not Progressing  Goal: Returns to optimal restraint-free functioning  Description: INTERVENTIONS:  - Assess the patient's behavior and symptoms that indicate continued need for restraint  - Identify and implement measures to help patient regain control  - Assess readiness for release of restraint   7/18/2022 1917 by Jose Brewster RN  Outcome: Not Progressing  7/18/2022 1916 by Jose Brewster RN  Outcome: Not Progressing
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
## 2022-07-18 NOTE — PROGRESS NOTES
07/18/22 1300   Activity/Group Checklist   Group   (recovery group)   Attendance Attended   Attendance Duration (min) 46-60   Interactions Interacted appropriately   Affect/Mood Appropriate   Goals Achieved Discussed safety plan;Discussed coping strategies; Discussed self-esteem issues; Able to listen to others; Able to engage in interactions; Able to reflect/comment on own behavior;Able to self-disclose; Able to recieve feedback

## 2022-07-19 PROCEDURE — 99232 SBSQ HOSP IP/OBS MODERATE 35: CPT | Performed by: STUDENT IN AN ORGANIZED HEALTH CARE EDUCATION/TRAINING PROGRAM

## 2022-07-19 RX ORDER — CHLORPROMAZINE HYDROCHLORIDE 25 MG/ML
100 INJECTION INTRAMUSCULAR
Status: DISCONTINUED | OUTPATIENT
Start: 2022-07-19 | End: 2022-07-26 | Stop reason: HOSPADM

## 2022-07-19 RX ORDER — DIPHENHYDRAMINE HYDROCHLORIDE 50 MG/ML
50 INJECTION INTRAMUSCULAR; INTRAVENOUS
Status: DISCONTINUED | OUTPATIENT
Start: 2022-07-19 | End: 2022-07-26 | Stop reason: HOSPADM

## 2022-07-19 RX ORDER — DIVALPROEX SODIUM 500 MG/1
500 TABLET, DELAYED RELEASE ORAL EVERY 12 HOURS SCHEDULED
Status: DISCONTINUED | OUTPATIENT
Start: 2022-07-19 | End: 2022-07-24

## 2022-07-19 RX ADMIN — PILOCARPINE HYDRCHLORIDE 5 MG: 5 TABLET, FILM COATED ORAL at 20:51

## 2022-07-19 RX ADMIN — DIVALPROEX SODIUM 500 MG: 500 TABLET, DELAYED RELEASE ORAL at 20:51

## 2022-07-19 RX ADMIN — ESTRADIOL 0.5 MG: 1 TABLET ORAL at 12:00

## 2022-07-19 RX ADMIN — TOPIRAMATE 100 MG: 100 TABLET, FILM COATED ORAL at 12:00

## 2022-07-19 RX ADMIN — OXYBUTYNIN CHLORIDE 5 MG: 5 TABLET, EXTENDED RELEASE ORAL at 12:00

## 2022-07-19 RX ADMIN — PILOCARPINE HYDRCHLORIDE 5 MG: 5 TABLET, FILM COATED ORAL at 12:00

## 2022-07-19 RX ADMIN — AMLODIPINE BESYLATE 5 MG: 5 TABLET ORAL at 12:00

## 2022-07-19 RX ADMIN — MONTELUKAST SODIUM 1 G: 4 TABLET, CHEWABLE ORAL at 20:52

## 2022-07-19 RX ADMIN — METOPROLOL TARTRATE 25 MG: 25 TABLET, FILM COATED ORAL at 20:51

## 2022-07-19 RX ADMIN — CHLORPROMAZINE HYDROCHLORIDE 100 MG: 25 INJECTION INTRAMUSCULAR at 13:08

## 2022-07-19 RX ADMIN — BUPROPION HYDROCHLORIDE 150 MG: 150 TABLET, FILM COATED, EXTENDED RELEASE ORAL at 12:00

## 2022-07-19 RX ADMIN — LORAZEPAM 2 MG: 2 INJECTION INTRAMUSCULAR; INTRAVENOUS at 08:34

## 2022-07-19 RX ADMIN — FUROSEMIDE 20 MG: 20 TABLET ORAL at 12:00

## 2022-07-19 RX ADMIN — Medication 5000 UNITS: at 12:00

## 2022-07-19 RX ADMIN — DIPHENHYDRAMINE HYDROCHLORIDE 50 MG: 50 INJECTION, SOLUTION INTRAMUSCULAR; INTRAVENOUS at 08:34

## 2022-07-19 RX ADMIN — LORATADINE 10 MG: 10 TABLET ORAL at 12:00

## 2022-07-19 RX ADMIN — METOPROLOL TARTRATE 25 MG: 25 TABLET, FILM COATED ORAL at 12:01

## 2022-07-19 RX ADMIN — LURASIDONE HYDROCHLORIDE 40 MG: 40 TABLET, FILM COATED ORAL at 12:00

## 2022-07-19 RX ADMIN — HALOPERIDOL LACTATE 5 MG: 5 INJECTION, SOLUTION INTRAMUSCULAR at 08:34

## 2022-07-19 RX ADMIN — TOPIRAMATE 100 MG: 100 TABLET, FILM COATED ORAL at 18:22

## 2022-07-19 RX ADMIN — DIPHENHYDRAMINE HYDROCHLORIDE 50 MG: 50 INJECTION, SOLUTION INTRAMUSCULAR; INTRAVENOUS at 13:08

## 2022-07-19 RX ADMIN — LOSARTAN POTASSIUM 50 MG: 50 TABLET, FILM COATED ORAL at 12:00
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
## 2022-07-19 NOTE — NURSING NOTE
PRN IM Thorazine 50mg and benadryl 50mg was moderately effective  Patient remains calm for a short period of time and then starts being verbally aggressive, threatening staff

## 2022-07-19 NOTE — PROGRESS NOTES
Progress Note - Behavioral Health   Yung Angeles 49 y o  female MRN: [id number]  Unit/Bed#: U 344-01 Encounter: [id number]    Assessment/Plan   Principal Problem:    Schizoaffective disorder, bipolar type ([facility] )  Active Problems:    Benign essential hypertension    Edema    Hypothyroidism    MAG (obstructive sleep apnea)    Overactive bladder    Marijuana abuse    COPD (chronic obstructive pulmonary disease) ([facility])    Class 3 severe obesity due to excess calories with serious comorbidity and body mass index (BMI) of 40 0 to 44 9 in adult [facility])    Medical clearance for psychiatric admission    GERD (gastroesophageal reflux disease)    Diarrhea    Borderline personality disorder ([place] [id number] )      Recommended Treatment:     1  Continue with medications:  a  Wellbutrin XL 150mg qd for mood symptoms  b  Depakote 500mg BID for mood stabilization  i  Despite admission OD, patient states Depakote works well for her and she would like to continue with Depakote for mood stabilization  c  Latuda 40mg qAM with breakfast, 60mg qPM with dinner  d  Topamax 100mg BID for migraines  e  Thorazine PRN IM increased to 100mg for severe agitation and aggressive behavior due to need for multiple IM's and restraint orders throughout the day  2  Continue with group therapy, milieu therapy and occupational therapy  3  Continue frequent safety checks and vitals per unit protocol  4  Continue with SLIM medical management as indicated  5   Continue coordinating with case management regarding disposition    Case discussed with treatment team   Risks, benefits and possible side effects of Medications: Risks, benefits, and possible side effects of medications have been explained to the patient, who verbalizes understanding    Legal Status: 201  Diet: Normal Diet    ------------------------------------------------------------    Subjective: Per nursing report, Bev Resendiz has been agitated, combative, verbally abusive to staff, and noncooperative on the unit and noncompliant with medications  Overnight, she received multiple PRN IM medications including Cogentin 1mg, Thorazine 50mg, Benadryl 50mg, Haldol 5mg x2, Ativan 1mg, Ativan 2mg and required a 1:1 and restraints multiple times for severe aggression and violent behavior toward staff  On evaluation, Vic Ramesh is lethargic, lying in bed, having just received Haldol 5mg IM, Ativan 2mg IM for severe aggression again toward staff  She states her mood is "angry " She reports sleeping poorly and her energy level today is low due to IM medications  Her appetite has been poor, she states she has not felt hungry, but is amenable to trying a chocolate supplement shake  She denies any adverse effects from medications other than sedation  Her goals for today are to call her father and employer to let them know she is in the hospital  Of note, the patient was very irritable throughout the interview, and remains perseverative over her belongings and calling her father  She did mention that her mother passed recently, and that she works for [employer]  She also states she feels Depakote works well for her, so Depakote will be continued  Today, Vic Ramesh is jarod for safety on the unit  She denies suicidal ideation, homicidal ideation, auditory hallucinations, and visual hallucinations  She denies paranoid thoughts or ideation but feels as though the staff is not allowing her to use the phone and are doing things to agitate her on purpose  Of note, at the time of writing this note the patient again required IM Thorazine 100mg and Benadryl 50mg for severe agitation      PRNs overnight: Cogentin 1mg IM, Thorazine 50mg IM, Benadryl 50mg IM, Haldol 5mg x2 IM, Ativan 1mg IM, Ativan 2mg IM  VS: Reviewed, within normal limits    Progress Toward Goals: No improvement    Psychiatric Review of Systems:  Behavior over the last 24 hours: unchanged  Sleep: Hypersomnia due to multiple IM medications for severe agitation  Appetite: decreased from baseline, poor oral intake  Medication side effects: none verbalized, as noted above  Medical ROS: requesting to see SLIM to have stitches removed from L hand, Complete review of systems is negative except as noted above      Vital signs in last 24 hours:  Temp:  [97 5 °F (36 4 °C)] 97 5 °F (36 4 °C)  HR:  [70-74] 74  Resp:  [20] 20  BP: (121-135)/(57-78) 135/78    Mental Status Exam:  Appearance:  drowsy, appears stated age, casually dressed, disheveled, obese and lying in bed with sheet over her   Behavior:  hostile, uncooperative, guarded and laying in bed   Motor: Unable to assess   Speech:  spontaneous, clear, slow, soft and coherent   Mood:  "angry"   Affect:  blunted, angry and irritable   Thought Process:  goal directed, perseverative   Thought Content: persecutory delusions, paranoid ideation   Perceptual disturbances: no reported hallucinations and does not appear to be responding to internal stimuli at this time   Risk Potential: No active or passive suicidal or homicidal ideation was verbalized during interview, Potential for aggression due to recent repeated hostil and aggressive behavior toward staff, and need for multiple IM medications for sedation   Cognition: oriented to self and situation, memory grossly intact, appears to be of average intelligence and cognition not formally tested   Insight:  Poor   Judgment: Poor     Current Medications:  Current Facility-Administered Medications   Medication Dose Route Frequency Provider Last Rate    albuterol  2 puff Inhalation Q6H PRN Silvio Salinas MD      aluminum-magnesium hydroxide-simethicone  30 mL Oral Q4H PRN Silvio Salinas MD      amLODIPine  5 mg Oral Daily Silvio Salinas MD      artificial tear  1 application Both Eyes U0C PRN Silvio Salinas MD      LORazepam  2 mg Intramuscular Q6H PRN Max 4/day Sury Cramer MD      And    haloperidol lactate  2 5 mg Intramuscular Q6H PRN Max 4/day Sarah Branham MD      And    benztropine  0 5 mg Intramuscular Q6H PRN Max 4/day Sarah Branham MD      haloperidol lactate  5 mg Intramuscular Q4H PRN Max 4/day Ahsan Ventura MD      And    LORazepam  2 mg Intramuscular Q4H PRN Max 4/day Ahsan Ventura MD      And    benztropine  1 mg Intramuscular Q4H PRN Max 4/day Saulno Audrey, MD      benztropine  1 mg Intramuscular Q4H PRN Max 6/day Eino Exon, MD      benztropine  1 mg Oral Q4H PRN Max 6/day Eino Exon, MD      buPROPion  150 mg Oral Daily Sarah Branham MD      chlorproMAZINE  100 mg Intramuscular Q4H PRN Max 3/day [employer], DO      And    diphenhydrAMINE  50 mg Intramuscular Q4H PRN Max 3/day [employer], DO      cholecalciferol  5,000 Units Oral Daily Ahsan Ventura, MD      colestipol  1 g Oral BID Polo Kaye PA-C      divalproex sodium  500 mg Oral Q12H Ilana Quintanilla MD      estradiol  0 5 mg Oral Daily Ahsan Ventura, MD      fluticasone  2 puff Inhalation BID Polo Kaye PA-C      furosemide  20 mg Oral Daily Ahsan Exon, MD      haloperidol  2 mg Oral Q4H PRN Max 6/day Saulno Exon, MD      haloperidol  5 mg Oral Q6H PRN Max 4/day Saulno Exon, MD      haloperidol  5 mg Oral Q4H PRN Max 4/day Ahsan Exon, MD      hydrOXYzine HCL  25 mg Oral Q6H PRN Max 4/day Eino Exon, MD      hydrOXYzine HCL  50 mg Oral Q4H PRN Max 4/day Eino Audrey, MD      Or    LORazepam  1 mg Intramuscular Q4H PRN Ahsan Exon, MD      ibuprofen  400 mg Oral Q4H PRN Saulno Exon, MD      ibuprofen  600 mg Oral Q6H PRN Eino Exon, MD      ibuprofen  800 mg Oral Q8H PRN Eino Exon, MD      ketotifen  1 drop Both Eyes BID Ahsan Ventura, MD      levothyroxine  100 mcg Oral Early Morning Ahsan Exon, MD      loperamide  2 mg Oral TID PRN Ahsan Ventura MD  loratadine  10 mg Oral Daily Patience Jorgensen MD      LORazepam  1 mg Oral Q4H PRN Max 6/day Patience Jorgensen MD      Or    LORazepam  2 mg Intramuscular Q6H PRN Max 3/day Patience Jorgensen MD      losartan  50 mg Oral Daily Patience Jorgensen MD      lurasidone  40 mg Oral Daily With Breakfast Virgen Chambers MD      lurasidone  60 mg Oral Daily With Maryjane Herrera MD      melatonin  3 mg Oral HS PRN Patience Jorgensen MD      metoprolol tartrate  25 mg Oral Q12H [address] Patience Jorgensen MD      oxybutynin  5 mg Oral Daily Patience Jorgensen MD      pantoprazole  40 mg Oral BID [facility] Patience Jorgensen MD      pilocarpine  5 mg Oral TID Patience Jorgensen MD      polyethylene glycol  17 g Oral Daily PRN Patience Jorgensen MD      propranolol  10 mg Oral Q8H PRN Patience Jorgensen MD      rOPINIRole  0 5 mg Oral HS Patience Jorgensen MD      senna-docusate sodium  1 tablet Oral Daily PRN Patience Jorgensen MD      topiramate  100 mg Oral BID Patience Jorgensen MD         Behavioral Health Medications: all current active meds have been reviewed  Changes as in plan section above  Laboratory results:  I have personally reviewed all pertinent laboratory/tests results  No results found for this or any previous visit (from the past 48 hour(s))  This note has been constructed using a voice recognition system  There may be translation, syntax, or grammatical errors  If you have any questions, please contact the dictating author      Jillian Javier DO  Psychiatry Residency, PGY-1

## 2022-07-19 NOTE — PLAN OF CARE
Problem: Prexisting or High Potential for Compromised Skin Integrity  Goal: Skin integrity is maintained or improved  Description: INTERVENTIONS:  - Identify patients at risk for skin breakdown  - Assess and monitor skin integrity  - Assess and monitor nutrition and hydration status  - Monitor labs   - Assess for incontinence   - Turn and reposition patient  - Assist with mobility/ambulation  - Relieve pressure over bony prominences  - Avoid friction and shearing  - Provide appropriate hygiene as needed including keeping skin clean and dry  - Evaluate need for skin moisturizer/barrier cream  - Collaborate with interdisciplinary team   - Patient/family teaching  - Consider wound care consult   Outcome: Progressing     Problem: Nutrition/Hydration-ADULT  Goal: Nutrient/Hydration intake appropriate for improving, restoring or maintaining nutritional needs  Description: Monitor and assess patient's nutrition/hydration status for malnutrition  Collaborate with interdisciplinary team and initiate plan and interventions as ordered  Monitor patient's weight and dietary intake as ordered or per policy  Utilize nutrition screening tool and intervene as necessary  Determine patient's food preferences and provide high-protein, high-caloric foods as appropriate       INTERVENTIONS:  - Monitor oral intake, urinary output, labs, and treatment plans  - Assess nutrition and hydration status and recommend course of action  - Evaluate amount of meals eaten  - Assist patient with eating if necessary   - Allow adequate time for meals  - Recommend/ encourage appropriate diets, oral nutritional supplements, and vitamin/mineral supplements  - Order, calculate, and assess calorie counts as needed  - Recommend, monitor, and adjust tube feedings and TPN/PPN based on assessed needs  - Assess need for intravenous fluids  - Provide specific nutrition/hydration education as appropriate  - Include patient/family/caregiver in decisions related to nutrition  Outcome: Progressing     Problem: Risk for Self Injury/Neglect  Goal: Treatment Goal: Remain safe during length of stay, learn and adopt new coping skills, and be free of self-injurious ideation, impulses and acts at the time of discharge  Outcome: Progressing  Goal: Refrain from harming self  Description: Interventions:  - Monitor patient closely, per order  - Develop a trusting relationship  - Supervise medication ingestion, monitor effects and side effects   Outcome: Progressing     Problem: Depression  Goal: Refrain from harming self  Description: Interventions:  - Monitor patient closely, per order   - Supervise medication ingestion, monitor effects and side effects   Outcome: Progressing     Problem: SAFETY, RESTRAINT - VIOLENT/SELF-DESTRUCTIVE  Goal: Remains free of harm/injury from restraints (Restraint for Violent/Self-Destructive Behavior)  Description: INTERVENTIONS:  - Instruct patient/family regarding restraint use   - Assess and monitor physiologic and psychological status   - Provide interventions and comfort measures to meet assessed patient needs   - Ensure continuous in person monitoring is provided   - Identify and implement measures to help patient regain control  - Assess readiness for release of restraint  Outcome: Progressing  Goal: Returns to optimal restraint-free functioning  Description: INTERVENTIONS:  - Assess the patient's behavior and symptoms that indicate continued need for restraint  - Identify and implement measures to help patient regain control  - Assess readiness for release of restraint   Outcome: Progressing     Problem: INVOLUNTARY ADMIT  Goal: Will cooperate with staff recommendations and doctor's orders and will demonstrate appropriate behavior  Description: INTERVENTIONS:  - Treat underlying conditions and offer medication as ordered  - Educate regarding involuntary admission procedures and rules  - Utilize positive consistent limit setting strategies to support patient and staff safety  Outcome: Progressing     Problem: DISCHARGE PLANNING - CARE MANAGEMENT  Goal: Discharge to post-acute care or home with appropriate resources  Description: INTERVENTIONS:  - Conduct assessment to determine patient/family and health care team treatment goals, and need for post-acute services based on payer coverage, community resources, and patient preferences, and barriers to discharge  - Address psychosocial, clinical, and financial barriers to discharge as identified in assessment in conjunction with the patient/family and health care team  - Arrange appropriate level of post-acute services according to patients   needs and preference and payer coverage in collaboration with the physician and health care team  - Communicate with and update the patient/family, physician, and health care team regarding progress on the discharge plan  - Arrange appropriate transportation to post-acute venues  Outcome: Progressing     Problem: Alteration in Thoughts and Perception  Goal: Treatment Goal: Gain control of psychotic behaviors/thinking, reduce/eliminate presenting symptoms and demonstrate improved reality functioning upon discharge  Outcome: Not Progressing  Goal: Verbalize thoughts and feelings  Description: Interventions:  - Promote a nonjudgmental and trusting relationship with the patient through active listening and therapeutic communication  - Assess patient's level of functioning, behavior and potential for risk  - Engage patient in 1 on 1 interactions  - Encourage patient to express fears, feelings, frustrations, and discuss symptoms    - [place]Ke[place] patient to reality, help patient recognize reality-based thinking   - Administer medications as ordered and assess for potential side effects  - Provide the patient education related to the signs and symptoms of the illness and desired effects of prescribed medications  Outcome: Not Progressing     Problem: Ineffective Coping  Goal: Identifies ineffective coping skills  Outcome: Not Progressing     Problem: Risk for Self Injury/Neglect  Goal: Verbalize thoughts and feelings  Description: Interventions:  - Assess and re-assess patient's lethality and potential for self-injury  - Engage patient in 1:1 interactions, daily, for a minimum of 15 minutes  - Encourage patient to express feelings, fears, frustrations, hopes  - Establish rapport/trust with patient   Outcome: Not Progressing

## 2022-07-19 NOTE — NURSING NOTE
Patient signed 72 hour notice with this RN present  Patient made aware of process and agreed to sign paper, stating "I was upset, I feel more like myself now   I just want to go home"

## 2022-07-19 NOTE — NURSING NOTE
Patient began yelling in hallway that her not calling her father was "unfair and no one cares" about her  Patient informed as to the work this RN had been doing per her request, that the numbers sheet she stated she arrived with was being searched for, a phone call was placed to the unit she was transferred from and the staff there was searching for any belongings that may have been left behind  Patient stood against the wall, and was crying loudly  Patient asked to please return to her room so we can discuss this further, patient then became verbally aggressive and began hitting walls  Patient asked to return to her room, patient then walked to room, striking her door with her right arm multiple times before sitting on her bed, facing away from this RN, security, and other staff members      Patient received IM medication from another RN, and complied with receiving the medication

## 2022-07-19 NOTE — NURSING NOTE
AUDIOLOGY REPORT    SUBJECTIVE: Storm Dutta is a 80 year old male was seen in the Audiology Clinic at  [facility] on 9/17/19 to discuss concerns with hearing and functional communication difficulties. The patient was accompanied by their wife. Storm has been seen previously on 7/17/2019, and results revealed a normal sloping to severe sensorineural hearing loss bilaterally.  The patient was medically evaluated and determined to be cleared for binaural hearing aids by Dr. Montes. Storm notes difficulty with communication in a variety of listening situations.    OBJECTIVE:    Patient is a hearing aid candidate. Patient would like to move forward with a hearing aid evaluation today. Therefore, the patient was presented with different options for amplification to help aid in communication. Discussed styles, levels of technology and monaural vs. binaural fitting.     The hearing aid(s) mutually chosen were:  Binaural: Phonak Audeo M90-R  COLOR: P5  Champagne  BATTERY SIZE: Rechargable  CANAL/ LENGTH: #1 S bilaterally        ASSESSMENT:   No diagnosis found.    Reviewed purchase information and warranty information with patient. The 45 day trial period was explained to patient. The patient was given a copy of the [place] Department of [employer] consumer brochure on purchasing hearing instruments. Patient risk factors have been provided to the patient in writing prior to the sale of the hearing aid per FDA regulation. The risk factors are also available in the User Instructional Booklet to be presented on the day of the hearing aid fitting. Hearing aid(s) ordered. Hearing aid evaluation completed.    PLAN: Storm is scheduled to return in 2-3 weeks for a hearing aid fitting and programming. Purchase agreement will be completed on that date. Please contact this clinic with any questions or concerns.      Sara [facility], #[id number]             Patient demonstrated good self control and contracted for safety  Restraints were removed and patient was compliant with scheduled HS medications  Patient had a snack and requested CPAP and went to sleep  Staff will maintain 1:1 observation for safety and support

## 2022-07-20 PROCEDURE — 94760 N-INVAS EAR/PLS OXIMETRY 1: CPT

## 2022-07-20 PROCEDURE — 99232 SBSQ HOSP IP/OBS MODERATE 35: CPT | Performed by: STUDENT IN AN ORGANIZED HEALTH CARE EDUCATION/TRAINING PROGRAM

## 2022-07-20 RX ADMIN — FUROSEMIDE 20 MG: 20 TABLET ORAL at 09:55

## 2022-07-20 RX ADMIN — AMLODIPINE BESYLATE 5 MG: 5 TABLET ORAL at 09:55

## 2022-07-20 RX ADMIN — FLUTICASONE PROPIONATE 2 PUFF: 110 AEROSOL, METERED RESPIRATORY (INHALATION) at 09:55

## 2022-07-20 RX ADMIN — DIVALPROEX SODIUM 500 MG: 500 TABLET, DELAYED RELEASE ORAL at 09:55

## 2022-07-20 RX ADMIN — LOSARTAN POTASSIUM 50 MG: 50 TABLET, FILM COATED ORAL at 09:55

## 2022-07-20 RX ADMIN — DICLOFENAC SODIUM 2 G: 10 GEL TOPICAL at 17:51

## 2022-07-20 RX ADMIN — PILOCARPINE HYDRCHLORIDE 5 MG: 5 TABLET, FILM COATED ORAL at 21:26

## 2022-07-20 RX ADMIN — OXYBUTYNIN CHLORIDE 5 MG: 5 TABLET, EXTENDED RELEASE ORAL at 09:55

## 2022-07-20 RX ADMIN — PANTOPRAZOLE SODIUM 40 MG: 40 TABLET, DELAYED RELEASE ORAL at 06:43

## 2022-07-20 RX ADMIN — KETOTIFEN FUMARATE 1 DROP: 0.35 SOLUTION/ DROPS OPHTHALMIC at 09:55

## 2022-07-20 RX ADMIN — PILOCARPINE HYDRCHLORIDE 5 MG: 5 TABLET, FILM COATED ORAL at 09:55

## 2022-07-20 RX ADMIN — DICLOFENAC SODIUM 2 G: 10 GEL TOPICAL at 21:27

## 2022-07-20 RX ADMIN — LURASIDONE HYDROCHLORIDE 60 MG: 40 TABLET, FILM COATED ORAL at 16:55

## 2022-07-20 RX ADMIN — ESTRADIOL 0.5 MG: 1 TABLET ORAL at 09:55

## 2022-07-20 RX ADMIN — MONTELUKAST SODIUM 1 G: 4 TABLET, CHEWABLE ORAL at 09:55

## 2022-07-20 RX ADMIN — Medication 5000 UNITS: at 09:55

## 2022-07-20 RX ADMIN — FLUTICASONE PROPIONATE 2 PUFF: 110 AEROSOL, METERED RESPIRATORY (INHALATION) at 20:00

## 2022-07-20 RX ADMIN — TOPIRAMATE 100 MG: 100 TABLET, FILM COATED ORAL at 09:55

## 2022-07-20 RX ADMIN — LEVOTHYROXINE SODIUM 100 MCG: 100 TABLET ORAL at 06:43

## 2022-07-20 RX ADMIN — METOPROLOL TARTRATE 25 MG: 25 TABLET, FILM COATED ORAL at 21:31

## 2022-07-20 RX ADMIN — ROPINIROLE HYDROCHLORIDE 0.5 MG: 0.5 TABLET, FILM COATED ORAL at 21:27

## 2022-07-20 RX ADMIN — MONTELUKAST SODIUM 1 G: 4 TABLET, CHEWABLE ORAL at 21:26

## 2022-07-20 RX ADMIN — METOPROLOL TARTRATE 25 MG: 25 TABLET, FILM COATED ORAL at 09:55

## 2022-07-20 RX ADMIN — LORATADINE 10 MG: 10 TABLET ORAL at 09:55

## 2022-07-20 RX ADMIN — DIVALPROEX SODIUM 500 MG: 500 TABLET, DELAYED RELEASE ORAL at 21:27

## 2022-07-20 RX ADMIN — PILOCARPINE HYDRCHLORIDE 5 MG: 5 TABLET, FILM COATED ORAL at 16:55

## 2022-07-20 RX ADMIN — ALUMINUM HYDROXIDE, MAGNESIUM HYDROXIDE, AND SIMETHICONE 30 ML: 200; 200; 20 SUSPENSION ORAL at 12:01

## 2022-07-20 RX ADMIN — DICLOFENAC SODIUM 2 G: 10 GEL TOPICAL at 14:46

## 2022-07-20 RX ADMIN — BUPROPION HYDROCHLORIDE 150 MG: 150 TABLET, FILM COATED, EXTENDED RELEASE ORAL at 09:55

## 2022-07-20 RX ADMIN — PANTOPRAZOLE SODIUM 40 MG: 40 TABLET, DELAYED RELEASE ORAL at 16:55

## 2022-07-20 RX ADMIN — LURASIDONE HYDROCHLORIDE 40 MG: 40 TABLET, FILM COATED ORAL at 07:55

## 2022-07-20 RX ADMIN — TOPIRAMATE 100 MG: 100 TABLET, FILM COATED ORAL at 17:55

## 2022-07-20 NOTE — NURSING NOTE
Pt denies SI, HI and A/V hallucinations; was extremely difficult to rise, ignored multiple attempts on approach, labile behavior when waking, yelled at RN, hit bed with fist, angry about not getting out of bed earlier for breakfast and "not getting woke up " Poor concentration, excessively loud voice at times, blaming others, calling physicians and staff "liars" before crying  Initially ignored attempts to administer meds, eventually accepted meds  Content is concrete, paranoid  After interaction, pt remained quiet and went back to resting

## 2022-07-20 NOTE — QUICK NOTE
Assessed patients right hand for suture removal  Patient reports today is 15 days post op  On evaluation, there is a small area of the incision that is not closed  Will reassess tomorrow  Patient reporting right sided anterior and posterior shoulder pain which she reports is 2/2 to the "stretcher" that she was transported on  No limitations in ROM, no obvious deformities  Will order voltaren gel

## 2022-07-20 NOTE — PROGRESS NOTES
[facility] Group Note     07/20/22 1500   Activity/Group Checklist   Group Life Skills  (The Feelings Wheel)   Attendance Attended   Attendance Duration (min) 16-30  (left group early)   Interactions Interacted appropriately  (verbally scant and didn't share with peers but did complete the activity)   Affect/Mood Appropriate;Calm   Goals Achieved Able to listen to others; Able to engage in interactions; Able to recieve feedback; Able to give feedback to another  (received resources/benefited from social presence of group)

## 2022-07-20 NOTE — PROGRESS NOTES
Progress Note - Behavioral Health   Yung Angeles 49 y o  female MRN: [id number]  Unit/Bed#: U 344-01 Encounter: [id number]    Assessment/Plan   Principal Problem:    Schizoaffective disorder, bipolar type ([facility] )  Active Problems:    Benign essential hypertension    Edema    Hypothyroidism    MAG (obstructive sleep apnea)    Overactive bladder    Marijuana abuse    COPD (chronic obstructive pulmonary disease) ([facility])    Class 3 severe obesity due to excess calories with serious comorbidity and body mass index (BMI) of 40 0 to 44 9 in adult [facility])    Medical clearance for psychiatric admission    GERD (gastroesophageal reflux disease)    Diarrhea    Borderline personality disorder ([place] [address] )      Recommended Treatment:     1  Continue with medications:  a  Increase Latuda to 60 mg q a m , 60 mg HS for mood stabilization starting tomorrow  b  Depakote 500 mg b i d  For mood stabilization  i  Order placed for VPA level on Friday, 07/22/2022 will titrate as needed  c  Wellbutrin  mg daily mood symptoms  2  Continue with group therapy, milieu therapy and occupational therapy  3  Continue frequent safety checks and vitals per unit protocol  4  Continue with SLIM medical management as indicated  5  Continue coordinating with case management regarding disposition    Case discussed with treatment team   Risks, benefits and possible side effects of Medications: Risks, benefits, and possible side effects of medications have been explained to the patient, who verbalizes understanding    Legal Status: 201  Diet: Normal diet    ------------------------------------------------------------    Subjective: Per nursing report, Bev Resendiz has been labile, agitated, noncooperative on the unit and intermittently compliant with medications  Overnight, patient remained in her room to rest after receiving IM Thorazine 100 mg p r n  for agitation in the afternoon      On evaluation, Bev Resendiz is labile, tearful at times, wearing paper scrubs  She states her mood is "very garcia, emotional " She reports sleeping throughout the night due to drowsiness from IM medication and her energy level today is adequate  Her appetite has been poor but is now improving back to baseline  Of note the patient states she asked nursing staff to make sure that she was awake for breakfast but that she missed breakfast this morning due to being very lethargic  She denies any adverse effects from medications  Her goals for today are to go to groups, actively participating, control her impulses and aggression, and continue with therapy  Today, Linette Soto is jarod for safety on the unit  She denies suicidal ideation, homicidal ideation, and denies current auditory hallucinations, and visual hallucinations but states she has had commanding auditory hallucinations telling her to harm herself or others recently before admission, as well as visual hallucinations at some points but is unable to clearly describe what these are  She denies paranoid thoughts or ideation, however she states she has been getting very agitated with staff for many reasons including not having her belongings, being placed in paper scrubs that do not fit correctly, and being given IM medications  Of note, the patient states she needed an outlet to vent, and that all of her anger has been bottled up, for the past few months which led to her numerous aggressive outbursts on admission  The patient describes herself as very garcia and is labile throughout the interview, crying multiple points  The patient states she would like to go home as soon as she can but is amenable to rescinding her 72 hour notice  The patient states she has been severely depressed and her recent previous hospitalization for depressive symptoms that have been worsening since the death of her mother this past August   The patient states all of these events led to her attempt overdose    The patient notes that she feels she needs therapy but does not currently have a therapist on her ACT team   Also of note, the patient perseverates over wearing paper scrubs, stating they do not fit her correctly  The patient was informed if she remains in behavioral control she can have regular clothing tomorrow  The patient is amenable to medication titration as needed  Of note the patient had her belongings dropped off by her father  The patient remains on one-to-one  PRNs overnight:  None   VS: Reviewed, within normal limits    Progress Toward Goals: Slow improvement    Psychiatric Review of Systems:  Behavior over the last 24 hours: improved  Sleep: hypersomnia  Appetite: improving, decreased from baseline  Medication side effects: none verbalized  Medical ROS: Complete review of systems is negative except as noted above      Vital signs in last 24 hours:  HR:  [103] 103  BP: (122)/(79) 122/79    Mental Status Exam:  Appearance:  alert, good eye contact, appears stated age, disheveled, obese, tattooed and Dressed in paper scrubs   Behavior:  calm, limited cooperativity, guarded and sitting comfortably   Motor: no abnormal movements, restless and fidgety and normal gait and balance   Speech:  spontaneous, clear, normal rate, normal volume and coherent   Mood:  "Allen"   Affect:  labile, depressed, anxious, irritable and tearful at times   Thought Process:  generally linear and goal-directed but Perseverative at times   Thought Content: Persecutory delusions, mild paranoia, ruminating thoughts   Perceptual disturbances: denies current hallucinations and does not appear to be responding to internal stimuli at this time   Risk Potential: No active or passive suicidal or homicidal ideation was verbalized during interview, Potential for aggression Due to recent behavior   Cognition: oriented to self and situation, memory grossly intact, appears to be of average intelligence and cognition not formally tested   Insight:  Limited   Judgment: Limited Current Medications:  Current Facility-Administered Medications   Medication Dose Route Frequency Provider Last Rate    albuterol  2 puff Inhalation Q6H PRN Sabi Grimm MD      aluminum-magnesium hydroxide-simethicone  30 mL Oral Q4H PRN Sabi Grimm MD      amLODIPine  5 mg Oral Daily Sabi Grimm MD      artificial tear  1 application Both Eyes J4K PRN Sabi Grimm MD      LORazepam  2 mg Intramuscular Q6H PRN Max 4/day Charlene Manning MD      And    haloperidol lactate  2 5 mg Intramuscular Q6H PRN Max 4/day Charlene Manning MD      And    benztropine  0 5 mg Intramuscular Q6H PRN Max 4/day Charlene Manning MD      haloperidol lactate  5 mg Intramuscular Q4H PRN Max 4/day Sabi Grimm MD      And    LORazepam  2 mg Intramuscular Q4H PRN Max 4/day Sabi Grimm MD      And    benztropine  1 mg Intramuscular Q4H PRN Max 4/day Sabi Grimm MD      benztropine  1 mg Intramuscular Q4H PRN Max 6/day Sabi Grimm MD      benztropine  1 mg Oral Q4H PRN Max 6/day Sabi Grimm MD      buPROPion  150 mg Oral Daily Charlene Manning MD      chlorproMAZINE  100 mg Intramuscular Q4H PRN Max 3/day Patrick Jeannette, DO      And    diphenhydrAMINE  50 mg Intramuscular Q4H PRN Max 3/day Patrick Jeannette, DO      cholecalciferol  5,000 Units Oral Daily Sabi Grimm MD      colestipol  1 g Oral BID Jessica Gentile PA-C      Diclofenac Sodium  2 g Topical 4x Daily Aria Goyal PA-C      divalproex sodium  500 mg Oral Q12H Erika Andersen MD      estradiol  0 5 mg Oral Daily Sabi Grimm MD      fluticasone  2 puff Inhalation BID Jessica Gentile PA-C      furosemide  20 mg Oral Daily Sabi Grimm MD      haloperidol  2 mg Oral Q4H PRN Max 6/day Sabi Grimm MD      haloperidol  5 mg Oral Q6H PRN Max 4/day Sabi Grimm MD      haloperidol  5 mg Oral Q4H PRN Max 4/day Ana Ayers Venita Olsen MD      hydrOXYzine HCL  25 mg Oral Q6H PRN Max 4/day Roxanne Rodriguez MD      hydrOXYzine HCL  50 mg Oral Q4H PRN Max 4/day Roxanne Rodriguez MD      Or    LORazepam  1 mg Intramuscular Q4H PRN Roxanne Rodriguez MD      ibuprofen  400 mg Oral Q4H PRN Roxanne Rodriguez MD      ibuprofen  600 mg Oral Q6H PRN Roxanne Rodriguez MD      ibuprofen  800 mg Oral Q8H PRN Roxanne Rodriguez MD      ketotifen  1 drop Both Eyes BID Roxanne Rodriguez MD      levothyroxine  100 mcg Oral Early Morning Roxanne Rodriguez MD      loperamide  2 mg Oral TID PRN Roxanne Rodriguez MD      loratadine  10 mg Oral Daily Roxanne Rodriguez MD      LORazepam  1 mg Oral Q4H PRN Max 6/day Roxanne Rodriguez MD      Or    LORazepam  2 mg Intramuscular Q6H PRN Max 3/day Roxanne Rodriguez MD      losartan  50 mg Oral Daily Roxanne Rodriguez MD      lurasidone  40 mg Oral Daily With Mary Solorio MD      lurasidone  60 mg Oral Daily With Dayan Morris MD      melatonin  3 mg Oral HS PRN Roxanne Rodriguez MD      metoprolol tartrate  25 mg Oral Q12H [address] MD      oxybutynin  5 mg Oral Daily Roxanne Rodriguez MD      pantoprazole  40 mg Oral BID [facility] Roxanne Rodriguez MD      pilocarpine  5 mg Oral TID Roxanne Rodriguez MD      polyethylene glycol  17 g Oral Daily PRN Roxanne Rodriguez MD      propranolol  10 mg Oral Q8H PRN Roxanne Rodriguez MD      rOPINIRole  0 5 mg Oral HS Roxanne Rodriguez MD      senna-docusate sodium  1 tablet Oral Daily PRN Roxanne Rodriguez MD      topiramate  100 mg Oral BID Roxanne Rodriguez MD         Behavioral Health Medications: all current active meds have been reviewed  Changes as in plan section above  Laboratory results:  I have personally reviewed all pertinent laboratory/tests results     No results found for this or any previous visit (from the past 48 hour(s))  This note has been constructed using a voice recognition system  There may be translation, syntax, or grammatical errors  If you have any questions, please contact the dictating author      Roverto Rust DO  Psychiatry Residency, PGY-1

## 2022-07-20 NOTE — PLAN OF CARE
Problem: Prexisting or High Potential for Compromised Skin Integrity  Goal: Skin integrity is maintained or improved  Description: INTERVENTIONS:  - Identify patients at risk for skin breakdown  - Assess and monitor skin integrity  - Assess and monitor nutrition and hydration status  - Monitor labs   - Assess for incontinence   - Turn and reposition patient  - Assist with mobility/ambulation  - Relieve pressure over bony prominences  - Avoid friction and shearing  - Provide appropriate hygiene as needed including keeping skin clean and dry  - Evaluate need for skin moisturizer/barrier cream  - Collaborate with interdisciplinary team   - Patient/family teaching  - Consider wound care consult   Outcome: Progressing     Problem: Nutrition/Hydration-ADULT  Goal: Nutrient/Hydration intake appropriate for improving, restoring or maintaining nutritional needs  Description: Monitor and assess patient's nutrition/hydration status for malnutrition  Collaborate with interdisciplinary team and initiate plan and interventions as ordered  Monitor patient's weight and dietary intake as ordered or per policy  Utilize nutrition screening tool and intervene as necessary  Determine patient's food preferences and provide high-protein, high-caloric foods as appropriate       INTERVENTIONS:  - Monitor oral intake, urinary output, labs, and treatment plans  - Assess nutrition and hydration status and recommend course of action  - Evaluate amount of meals eaten  - Assist patient with eating if necessary   - Allow adequate time for meals  - Recommend/ encourage appropriate diets, oral nutritional supplements, and vitamin/mineral supplements  - Order, calculate, and assess calorie counts as needed  - Recommend, monitor, and adjust tube feedings and TPN/PPN based on assessed needs  - Assess need for intravenous fluids  - Provide specific nutrition/hydration education as appropriate  - Include patient/family/caregiver in decisions related to nutrition  Outcome: Progressing     Problem: Alteration in Thoughts and Perception  Goal: Treatment Goal: Gain control of psychotic behaviors/thinking, reduce/eliminate presenting symptoms and demonstrate improved reality functioning upon discharge  Outcome: Progressing  Goal: Verbalize thoughts and feelings  Description: Interventions:  - Promote a nonjudgmental and trusting relationship with the patient through active listening and therapeutic communication  - Assess patient's level of functioning, behavior and potential for risk  - Engage patient in 1 on 1 interactions  - Encourage patient to express fears, feelings, frustrations, and discuss symptoms    - [place] patient to reality, help patient recognize reality-based thinking   - Administer medications as ordered and assess for potential side effects  - Provide the patient education related to the signs and symptoms of the illness and desired effects of prescribed medications  Outcome: Not Progressing     Problem: Risk for Self Injury/Neglect  Goal: Treatment Goal: Remain safe during length of stay, learn and adopt new coping skills, and be free of self-injurious ideation, impulses and acts at the time of discharge  Outcome: Progressing  Goal: Verbalize thoughts and feelings  Description: Interventions:  - Assess and re-assess patient's lethality and potential for self-injury  - Engage patient in 1:1 interactions, daily, for a minimum of 15 minutes  - Encourage patient to express feelings, fears, frustrations, hopes  - Establish rapport/trust with patient   Outcome: Progressing  Goal: Refrain from harming self  Description: Interventions:  - Monitor patient closely, per order  - Develop a trusting relationship  - Supervise medication ingestion, monitor effects and side effects   Outcome: Progressing     Problem: Depression  Goal: Refrain from harming self  Description: Interventions:  - Monitor patient closely, per order   - Supervise medication ingestion, monitor effects and side effects   Outcome: Not Progressing     Problem: SAFETY, RESTRAINT - VIOLENT/SELF-DESTRUCTIVE  Goal: Remains free of harm/injury from restraints (Restraint for Violent/Self-Destructive Behavior)  Description: INTERVENTIONS:  - Instruct patient/family regarding restraint use   - Assess and monitor physiologic and psychological status   - Provide interventions and comfort measures to meet assessed patient needs   - Ensure continuous in person monitoring is provided   - Identify and implement measures to help patient regain control  - Assess readiness for release of restraint  Outcome: Progressing  Goal: Returns to optimal restraint-free functioning  Description: INTERVENTIONS:  - Assess the patient's behavior and symptoms that indicate continued need for restraint  - Identify and implement measures to help patient regain control  - Assess readiness for release of restraint   Outcome: Progressing     Problem: INVOLUNTARY ADMIT  Goal: Will cooperate with staff recommendations and doctor's orders and will demonstrate appropriate behavior  Description: INTERVENTIONS:  - Treat underlying conditions and offer medication as ordered  - Educate regarding involuntary admission procedures and rules  - Utilize positive consistent limit setting strategies to support patient and staff safety  Outcome: Progressing

## 2022-07-20 NOTE — PROGRESS NOTES
22 1849   Team Meeting   Meeting Type Daily Rounds   Team Members Present   Team Members Present Physician;Nurse;   Physician Team Member Dr Kaia Aparicio Team Member RANDOLPH JOY [facility] Management Team Member Sunitha   Patient/Family Present   Patient Present No   Patient's Family Present No   Pt medication compliant  Pt received PRN Haldol Ativan and Benadryl after screaming and hitting wall  Pt stated she was upset d/t receiving wrong cereal  Pt signed 72 hour notice to  on   Discharge to be determined

## 2022-07-20 NOTE — NURSING NOTE
Close proximity level of observation maintained overnight  72 hour notice to withdraw from treatment still ongoing  CPAP in use overnight

## 2022-07-20 NOTE — NURSING NOTE
Pt has managed to remain calm and in behavioral control remainder of shift  Improved concentration, appropriate discussions with RN  Ongoing anxiety is stable  No labile behaviors observed

## 2022-07-20 NOTE — NURSING NOTE
RN was informed pt rescinded 72 hour notice, witnessed on 1130am on 7/20/22  Pt was educated in legal ramifications, risks and limitations of contract and vocalizes understanding of education

## 2022-07-20 NOTE — NURSING NOTE
Patient demonstrated labile behaviors most of shift, but did not act out  Patient utilized staff support and did not require any additional PRN medications  Patient glasses were found  Patient's father dropped off belongings from [address]  Patient wrote down number stored in her phone  Patient was compliant with scheduled medication except for Requip and flovent  Patient denied any thoughts of self harm  Patient demonstrated better self control this shift  1:1 observation maintained for safety and support

## 2022-07-21 PROCEDURE — 99232 SBSQ HOSP IP/OBS MODERATE 35: CPT | Performed by: STUDENT IN AN ORGANIZED HEALTH CARE EDUCATION/TRAINING PROGRAM

## 2022-07-21 RX ADMIN — MONTELUKAST SODIUM 1 G: 4 TABLET, CHEWABLE ORAL at 21:01

## 2022-07-21 RX ADMIN — DICLOFENAC SODIUM 2 G: 10 GEL TOPICAL at 08:27

## 2022-07-21 RX ADMIN — LOSARTAN POTASSIUM 50 MG: 50 TABLET, FILM COATED ORAL at 08:20

## 2022-07-21 RX ADMIN — OXYBUTYNIN CHLORIDE 5 MG: 5 TABLET, EXTENDED RELEASE ORAL at 08:21

## 2022-07-21 RX ADMIN — PILOCARPINE HYDRCHLORIDE 5 MG: 5 TABLET, FILM COATED ORAL at 08:22

## 2022-07-21 RX ADMIN — HYDROXYZINE HYDROCHLORIDE 50 MG: 50 TABLET, FILM COATED ORAL at 13:50

## 2022-07-21 RX ADMIN — METOPROLOL TARTRATE 25 MG: 25 TABLET, FILM COATED ORAL at 08:22

## 2022-07-21 RX ADMIN — PANTOPRAZOLE SODIUM 40 MG: 40 TABLET, DELAYED RELEASE ORAL at 06:16

## 2022-07-21 RX ADMIN — LORATADINE 10 MG: 10 TABLET ORAL at 08:21

## 2022-07-21 RX ADMIN — METOPROLOL TARTRATE 25 MG: 25 TABLET, FILM COATED ORAL at 21:01

## 2022-07-21 RX ADMIN — DICLOFENAC SODIUM 2 G: 10 GEL TOPICAL at 21:06

## 2022-07-21 RX ADMIN — ROPINIROLE HYDROCHLORIDE 0.5 MG: 0.5 TABLET, FILM COATED ORAL at 21:01

## 2022-07-21 RX ADMIN — LEVOTHYROXINE SODIUM 100 MCG: 100 TABLET ORAL at 06:16

## 2022-07-21 RX ADMIN — PANTOPRAZOLE SODIUM 40 MG: 40 TABLET, DELAYED RELEASE ORAL at 15:33

## 2022-07-21 RX ADMIN — DIVALPROEX SODIUM 500 MG: 500 TABLET, DELAYED RELEASE ORAL at 21:01

## 2022-07-21 RX ADMIN — HALOPERIDOL 5 MG: 5 TABLET ORAL at 15:33

## 2022-07-21 RX ADMIN — FUROSEMIDE 20 MG: 20 TABLET ORAL at 08:20

## 2022-07-21 RX ADMIN — TOPIRAMATE 100 MG: 100 TABLET, FILM COATED ORAL at 17:00

## 2022-07-21 RX ADMIN — PILOCARPINE HYDRCHLORIDE 5 MG: 5 TABLET, FILM COATED ORAL at 21:01

## 2022-07-21 RX ADMIN — TOPIRAMATE 100 MG: 100 TABLET, FILM COATED ORAL at 08:21

## 2022-07-21 RX ADMIN — Medication 5000 UNITS: at 08:22

## 2022-07-21 RX ADMIN — LORAZEPAM 2 MG: 2 INJECTION INTRAMUSCULAR; INTRAVENOUS at 21:34

## 2022-07-21 RX ADMIN — HALOPERIDOL 5 MG: 5 TABLET ORAL at 14:51

## 2022-07-21 RX ADMIN — PILOCARPINE HYDRCHLORIDE 5 MG: 5 TABLET, FILM COATED ORAL at 16:49

## 2022-07-21 RX ADMIN — DIVALPROEX SODIUM 500 MG: 500 TABLET, DELAYED RELEASE ORAL at 08:21

## 2022-07-21 RX ADMIN — LURASIDONE HYDROCHLORIDE 60 MG: 40 TABLET, FILM COATED ORAL at 16:49

## 2022-07-21 RX ADMIN — HYDROXYZINE HYDROCHLORIDE 50 MG: 50 TABLET, FILM COATED ORAL at 17:51

## 2022-07-21 RX ADMIN — MONTELUKAST SODIUM 1 G: 4 TABLET, CHEWABLE ORAL at 08:28

## 2022-07-21 RX ADMIN — LURASIDONE HYDROCHLORIDE 60 MG: 40 TABLET, FILM COATED ORAL at 08:21

## 2022-07-21 RX ADMIN — BUPROPION HYDROCHLORIDE 150 MG: 150 TABLET, FILM COATED, EXTENDED RELEASE ORAL at 08:20

## 2022-07-21 RX ADMIN — IBUPROFEN 800 MG: 400 TABLET ORAL at 14:50

## 2022-07-21 RX ADMIN — ESTRADIOL 0.5 MG: 1 TABLET ORAL at 08:23

## 2022-07-21 NOTE — PROGRESS NOTES
07/21/22 1000   Activity/Group Checklist   Group Other (Comment)  (Group Art Therapy/Psychodynamic, Open Choice with Process Discussion)   Attendance Attended   Attendance Duration (min) Greater than 60   Interactions Interacted appropriately   Affect/Mood Appropriate   Goals Achieved Able to listen to others; Able to engage in interactions; Able to manage/cope with feelings; Able to recieve feedback; Able to give feedback to another;Able to self-disclose; Identified feelings; Discussed coping strategies  (Able to engage materials; full participation with discussion)

## 2022-07-21 NOTE — NURSING NOTE
Patient is calm and cooperative on the unit  Pleasant and approachable  1:1 observation continues  Patient is wearing paper scrubs, but her clothes are currently in the washer and will be dried and inventoried  Patient is appreciative on getting to wear her clothes today  She was reminded that getting her clothes back depends on her behavior  Patient verbalized understanding  Med and meal compliant  Denies SI, HI, SIB, auditory and visual hallucinations  Staff will continue to support per treatment plan

## 2022-07-21 NOTE — PLAN OF CARE
Problem: Prexisting or High Potential for Compromised Skin Integrity  Goal: Skin integrity is maintained or improved  Description: INTERVENTIONS:  - Identify patients at risk for skin breakdown  - Assess and monitor skin integrity  - Assess and monitor nutrition and hydration status  - Monitor labs   - Assess for incontinence   - Turn and reposition patient  - Assist with mobility/ambulation  - Relieve pressure over bony prominences  - Avoid friction and shearing  - Provide appropriate hygiene as needed including keeping skin clean and dry  - Evaluate need for skin moisturizer/barrier cream  - Collaborate with interdisciplinary team   - Patient/family teaching  - Consider wound care consult   Outcome: Progressing     Problem: Nutrition/Hydration-ADULT  Goal: Nutrient/Hydration intake appropriate for improving, restoring or maintaining nutritional needs  Description: Monitor and assess patient's nutrition/hydration status for malnutrition  Collaborate with interdisciplinary team and initiate plan and interventions as ordered  Monitor patient's weight and dietary intake as ordered or per policy  Utilize nutrition screening tool and intervene as necessary  Determine patient's food preferences and provide high-protein, high-caloric foods as appropriate       INTERVENTIONS:  - Monitor oral intake, urinary output, labs, and treatment plans  - Assess nutrition and hydration status and recommend course of action  - Evaluate amount of meals eaten  - Assist patient with eating if necessary   - Allow adequate time for meals  - Recommend/ encourage appropriate diets, oral nutritional supplements, and vitamin/mineral supplements  - Order, calculate, and assess calorie counts as needed  - Recommend, monitor, and adjust tube feedings and TPN/PPN based on assessed needs  - Assess need for intravenous fluids  - Provide specific nutrition/hydration education as appropriate  - Include patient/family/caregiver in decisions related to nutrition  Outcome: Progressing     Problem: Alteration in Thoughts and Perception  Goal: Treatment Goal: Gain control of psychotic behaviors/thinking, reduce/eliminate presenting symptoms and demonstrate improved reality functioning upon discharge  Outcome: Progressing  Goal: Verbalize thoughts and feelings  Description: Interventions:  - Promote a nonjudgmental and trusting relationship with the patient through active listening and therapeutic communication  - Assess patient's level of functioning, behavior and potential for risk  - Engage patient in 1 on 1 interactions  - Encourage patient to express fears, feelings, frustrations, and discuss symptoms    - [place] patient to reality, help patient recognize reality-based thinking   - Administer medications as ordered and assess for potential side effects  - Provide the patient education related to the signs and symptoms of the illness and desired effects of prescribed medications  Outcome: Progressing     Problem: Ineffective Coping  Goal: Identifies ineffective coping skills  Outcome: Progressing     Problem: Risk for Self Injury/Neglect  Goal: Treatment Goal: Remain safe during length of stay, learn and adopt new coping skills, and be free of self-injurious ideation, impulses and acts at the time of discharge  Outcome: Progressing  Goal: Verbalize thoughts and feelings  Description: Interventions:  - Assess and re-assess patient's lethality and potential for self-injury  - Engage patient in 1:1 interactions, daily, for a minimum of 15 minutes  - Encourage patient to express feelings, fears, frustrations, hopes  - Establish rapport/trust with patient   Outcome: Progressing  Goal: Refrain from harming self  Description: Interventions:  - Monitor patient closely, per order  - Develop a trusting relationship  - Supervise medication ingestion, monitor effects and side effects   Outcome: Progressing     Problem: Depression  Goal: Refrain from harming self  Description: Interventions:  - Monitor patient closely, per order   - Supervise medication ingestion, monitor effects and side effects   Outcome: Progressing     Problem: SAFETY, RESTRAINT - VIOLENT/SELF-DESTRUCTIVE  Goal: Remains free of harm/injury from restraints (Restraint for Violent/Self-Destructive Behavior)  Description: INTERVENTIONS:  - Instruct patient/family regarding restraint use   - Assess and monitor physiologic and psychological status   - Provide interventions and comfort measures to meet assessed patient needs   - Ensure continuous in person monitoring is provided   - Identify and implement measures to help patient regain control  - Assess readiness for release of restraint  Outcome: Progressing  Goal: Returns to optimal restraint-free functioning  Description: INTERVENTIONS:  - Assess the patient's behavior and symptoms that indicate continued need for restraint  - Identify and implement measures to help patient regain control  - Assess readiness for release of restraint   Outcome: Progressing     Problem: INVOLUNTARY ADMIT  Goal: Will cooperate with staff recommendations and doctor's orders and will demonstrate appropriate behavior  Description: INTERVENTIONS:  - Treat underlying conditions and offer medication as ordered  - Educate regarding involuntary admission procedures and rules  - Utilize positive consistent limit setting strategies to support patient and staff safety  Outcome: Progressing     Problem: DISCHARGE PLANNING - CARE MANAGEMENT  Goal: Discharge to post-acute care or home with appropriate resources  Description: INTERVENTIONS:  - Conduct assessment to determine patient/family and health care team treatment goals, and need for post-acute services based on payer coverage, community resources, and patient preferences, and barriers to discharge  - Address psychosocial, clinical, and financial barriers to discharge as identified in assessment in conjunction with the patient/family and health care team  - Arrange appropriate level of post-acute services according to patients   needs and preference and payer coverage in collaboration with the physician and health care team  - Communicate with and update the patient/family, physician, and health care team regarding progress on the discharge plan  - Arrange appropriate transportation to post-acute venues  Outcome: Progressing

## 2022-07-21 NOTE — PHYSICIAN ADVISOR
Current patient class: Inpatient  The patient is currently on Hospital Day: 3 at [address]      This patient was originally admitted to the hospital under observation class  After admission the patient was reevaluated and determined to require further hospitalization  The patient was then documented to require at least a 2nd midnight in the hospital  As such the patient was then expected to satisfy the 2 midnight benchmark and was therefore eligible for inpatient admission  After review of the relevant documentation, labs, vital signs and test results, the patient is appropriate for INPATIENT ADMISSION  Admission to the hospital as an inpatient is a complex decision making process which requires the practitioner to consider the patients presenting complaint, history and physical examination and all relevant testing  With this in mind, in this case, the patient was deemed appropriate for INPATIENT ADMISSION  After review of the documentation and testing available at the time of the admission I concur with this clinical determination of medical necessity  Rationale is as follows: The patient is a 60-year-old female who presented to the emergency department after a suicide attempt via overdose  Toxicology was involved  The patient had an increasing valproic acid level with intentional valproic acid overdose also known to have a prescription for bupropion  The patient was converted to an inpatient admission after the first midnight  She required a continued one-to-one continual observation sitter  The patient was agreeable to signing a 201  Case management was involved and there were no immediate facility beds  Over the course of the stay the patient required intravenous Zofran for nausea  She was noted to have multiple mood swings and behaviors  Prior to transfer she did require Haldol, Ativan, benztropine    A bed became available after two midnights and she was transferred to [facility] for additional management  Inpatient class was appropriate given her need for continued hospitalization and hospital level management  The patients vitals on arrival were   ED Triage Vitals   Temperature Pulse Respirations Blood Pressure SpO2   07/13/22 1537 07/13/22 1501 07/13/22 1501 07/13/22 1501 07/13/22 1501   98 °F (36 7 °C) 72 18 134/89 97 %      Temp Source Heart Rate Source Patient Position - Orthostatic VS BP Location FiO2 (%)   07/13/22 1537 07/13/22 1501 07/13/22 1501 07/13/22 1501 --   Oral Monitor Sitting Right arm       Pain Score       07/13/22 1501       No Pain           Past Medical History:   Diagnosis Date    Anxiety     Arthritis     oa cassandra knees    Asthma     good control- no medications    Yan's esophagus     Bipolar affective disorder (HCC)     Chronic pain disorder     lumbar    CPAP (continuous positive airway pressure) dependence     Degenerative disc disease at L5-S1 level     Deliberate self-cutting     Depression 09/16/2008    Disease of thyroid gland     hypo    MARTINEZ (dyspnea on exertion)     Drug overdose 10/28/2008    suicide attempt    Dysphagia     Dyspnea     Edema     BLE    GERD (gastroesophageal reflux disease)     High blood pressure     History of COVID-19 12/30/2020    Symptoms started on 12/30/2020 and then got worse  Today she is feeling a little bit better    She is a candidate for monoclonal antibody infusion and set for 1/6/21 @ 1pm at [facility]  01/07/21 - telemedicine -     Hypertension     Knee pain, bilateral     Especially right    Migraines     Obese     Overactive bladder     Sjogren's disease ([address] )     Sleep apnea     Stress incontinence     Suicidal ideations     Umbilical hernia     surgical repair today 4/24/2019    Use of cane as ambulatory aid     awaiting b/l knee replacement    Wears glasses      Past Surgical History:   Procedure Laterality Date    BREAST CYST EXCISION Right 1989    CARPAL TUNNEL RELEASE Left     CHOLECYSTECTOMY  05/2003    Laparoscopic    COLONOSCOPY      01/12/2009    DILATION AND CURETTAGE OF UTERUS      ELBOW SURGERY Left     x2   No hardware    ESOPHAGOGASTRODUODENOSCOPY      FOOT SURGERY Left     Plantar fasciotomy    HYSTERECTOMY      laporoscopic, partial    KNEE ARTHROSCOPY Bilateral     OOPHORECTOMY Left 10/2015    DE ANAL SPHINCTEROTOMY N/A 08/31/2018    Procedure: EUA, LEFT PARTIAL INTERNAL SPHINCTEROTOMY;  Surgeon: Sarabjit Gilbert MD;  Location: [address] [place] [address] MAIN OR;  Service: Colorectal    DE GASTROCNEMIUS RECESSION Left 02/24/2021    Procedure: RECESSION GASTROC OPEN;  Surgeon: Reyna Mariee DPM;  Location: [address] [place] [address] MAIN OR;  Service: [place] [address] NWSW,5+L/Z,RAQPO N/A 04/24/2019    Procedure: REPAIR HERNIA UMBILICAL LAPAROSCOPIC W/ ROBOTICS;  Surgeon: China Castillo MD;  Location: [facility] Main OR;  Service: General    REDUCTION MAMMAPLASTY Bilateral 1999    REPAIR LACERATION Left     left hand-5/18/2009    REPLACEMENT TOTAL KNEE Right     ROTATOR CUFF REPAIR Left     TONSILECTOMY AND ADNOIDECTOMY     [facility] PROCEDURE  04/22/2021    VEIN LIGATION Bilateral     WISDOM TOOTH EXTRACTION         The patient was admitted to the hospital at  3:31 PM on 7/14/22 for the following diagnosis:  Mood disorder ([address] ) [F39]  Attempted suicide ([address] ) Sunshine Fournier  Intentional drug overdose, initial encounter ([address] ) [T50 902A]  Overdose, intentional self-harm, initial encounter ([address] ) Rosy Bocanegra have been placed to:   IP CONSULT TO TOXICOLOGY  IP CONSULT TO PSYCHIATRY    Vitals:    07/14/22 1645 07/14/22 2057 07/15/22 0810 07/15/22 1702   BP: 112/68 125/60 118/71 129/83   BP Location: Right arm Left arm  Left arm   Pulse: 59 72  62   Resp: 16 16 18 18   Temp:  98 °F (36 7 °C)     TempSrc:  Oral     SpO2: 94%      Height:           Most recent labs:    No results for input(s): WBC, HGB, HCT, PLT, K, NA, CALCIUM, BUN, CREATININE, LIPASE, AMYLASE, INR, TROPONINI, CKTOTAL, AST, ALT, ALKPHOS, BILITOT in the last 72 hours      Scheduled Meds:  Facility-Administered Medications Ordered in Other Encounters   Medication Dose Route Frequency Provider Last Rate    albuterol  2 puff Inhalation Q6H PRN Silvio Salinas MD      aluminum-magnesium hydroxide-simethicone  30 mL Oral Q4H PRN Silvio Salinas MD      amLODIPine  5 mg Oral Daily Silvio Salinas MD      artificial tear  1 application Both Eyes H1N PRN Silvio Salinas MD      LORazepam  2 mg Intramuscular Q6H PRN Max 4/day Sury Cramer MD      And    haloperidol lactate  2 5 mg Intramuscular Q6H PRN Max 4/day Sury Cramer MD      And    benztropine  0 5 mg Intramuscular Q6H PRN Max 4/day Sury Cramer MD      haloperidol lactate  5 mg Intramuscular Q4H PRN Max 4/day Silvio Salinas MD      And    LORazepam  2 mg Intramuscular Q4H PRN Max 4/day Silvio Salinas MD      And    benztropine  1 mg Intramuscular Q4H PRN Max 4/day Silivo Salinas MD      benztropine  1 mg Intramuscular Q4H PRN Max 6/day Silvio Salinas MD      benztropine  1 mg Oral Q4H PRN Max 6/day Silvio Salinas MD      buPROPion  150 mg Oral Daily Sury Cramer MD      chlorproMAZINE  100 mg Intramuscular Q4H PRN Max 3/day Alicia Talha, DO      And    diphenhydrAMINE  50 mg Intramuscular Q4H PRN Max 3/day Alicia Talha, DO      cholecalciferol  5,000 Units Oral Daily Silvio Salinas MD      colestipol  1 g Oral BID Cele Garcia PA-C      Diclofenac Sodium  2 g Topical 4x Daily Aria Goyal PA-C      divalproex sodium  500 mg Oral Q12H Betsy Thapa MD      estradiol  0 5 mg Oral Daily Silvio Salinas MD      fluticasone  2 puff Inhalation BID Cele Garcia PA-C      furosemide  20 mg Oral Daily Silvio Salinas MD      haloperidol  2 mg Oral Q4H PRN Max 6/day MD Aby Padron haloperidol  5 mg Oral Q6H PRN Max 4/day Ofelia Stock MD      haloperidol  5 mg Oral Q4H PRN Max 4/day Ofelia Stock MD      hydrOXYzine HCL  25 mg Oral Q6H PRN Max 4/day Ofelia Stock MD      hydrOXYzine HCL  50 mg Oral Q4H PRN Max 4/day Ofelia Stock MD      Or    LORazepam  1 mg Intramuscular Q4H PRN Ofelia Stock MD      ibuprofen  400 mg Oral Q4H PRN Ofelia Stock MD      ibuprofen  600 mg Oral Q6H PRN Ofelia Stock MD      ibuprofen  800 mg Oral Q8H PRN Ofelia Stock MD      ketotifen  1 drop Both Eyes BID Ofelia Stock MD      levothyroxine  100 mcg Oral Early Morning Ofelia Stock MD      loperamide  2 mg Oral TID PRN Ofelia Stock MD      loratadine  10 mg Oral Daily Ofelia Stock MD      LORazepam  1 mg Oral Q4H PRN Max 6/day Ofelia Stock MD      Or    LORazepam  2 mg Intramuscular Q6H PRN Max 3/day Ofelia Stock MD      losartan  50 mg Oral Daily Ofelia Stock MD      lurasidone  60 mg Oral Daily With Edgar Courts, MD      lurasidone  60 mg Oral Daily With [facility]      melatonin  3 mg Oral HS PRN Ofelia Stock MD      metoprolol tartrate  25 mg Oral Q12H [address] MD      oxybutynin  5 mg Oral Daily Ofelia Stock MD      pantoprazole  40 mg Oral BID [facility] Ofelia Stock MD      pilocarpine  5 mg Oral TID Ofelia Stock MD      polyethylene glycol  17 g Oral Daily PRN Ofelia Stock MD      propranolol  10 mg Oral Q8H PRN Ofelia Stock MD      rOPINIRole  0 5 mg Oral HS Ofelia Stock MD      senna-docusate sodium  1 tablet Oral Daily PRN Ofelia Stock MD      topiramate  100 mg Oral BID Ofelia Stock MD       Continuous Infusions:No current facility-administered medications for this encounter  PRN Meds:      Surgical procedures (if appropriate):

## 2022-07-21 NOTE — PROGRESS NOTES
Progress Note - Behavioral Health   Nataliia Montejo 49 y o  female MRN: [id number]  Unit/Bed#: U 344-01 Encounter: [id number]    Assessment/Plan   Principal Problem:    Schizoaffective disorder, bipolar type ([place] [address] )  Active Problems:    Benign essential hypertension    Edema    Hypothyroidism    MAG (obstructive sleep apnea)    Overactive bladder    Marijuana abuse    COPD (chronic obstructive pulmonary disease) ([facility])    Class 3 severe obesity due to excess calories with serious comorbidity and body mass index (BMI) of 40 0 to 44 9 in adult [facility])    Medical clearance for psychiatric admission    GERD (gastroesophageal reflux disease)    Diarrhea    Borderline personality disorder ([place] [address] )      Recommended Treatment:     1  Continue with medications:  a  Wellbutrin  mg daily for depressive symptoms  b  Depakote 500 mg b i d  For mood stabilization  c  Latuda 60 mg q a m , 60 mg q h s  For mood stabilization  d  Topamax 100 mg b i d  For migraines  2  Continue with group therapy, milieu therapy and occupational therapy  3  Continue frequent safety checks and vitals per unit protocol  4  Continue with SLIM medical management as indicated  5  Continue coordinating with case management regarding disposition    Case discussed with treatment team   Risks, benefits and possible side effects of Medications: Risks, benefits, and possible side effects of medications have been explained to the patient, who verbalizes understanding    Legal Status: 201  Diet:  Normal diet    ------------------------------------------------------------    Subjective: Per nursing report, Ramirez Billings has been home, pleasant, cooperative on the unit and compliant with medications  Overnight, the patient returned to her room to rest with no incidents  On evaluation, Ramirez Billings is wearing paper scrubs, appears brighter than previously, and is also wearing glasses   She states her mood is "a lot better " She reports sleeping "okay, and her energy level today is improving  Her appetite has been improving back to her baseline, she states she has been really hungry and endorses eating breakfast this a m  Delfinacollin Mccrarysk She denies any adverse effects from medications  Her goals for today are to continue attending and participating in group therapy, remain in behavioral control and continued compliance with medical treatment  Of note patient is also planning to read a paperback book that she has in her belongings  Today the patient states that she has been using meth frequently over the past few years, she states her last use was prior to admission to the hospital   The patient endorses insufflating 1-2 lines of meth each day and states she has been slowly trying to taper herself off by going a few days between use  When asked about her meth use the patient states she uses meth to give herself energy  She feels as though her psych medications make her drowsy throughout the day  Today, Domingo is jarod for safety on the unit  She denies suicidal ideation, homicidal ideation, auditory hallucinations, and visual hallucinations  She denies paranoid thoughts or ideation  Of note the patient perseverates over discharge, and is uninterested in rehabilitation for drugs attempting to rationalize her decision by stating she needs to take care of her cat despite having help from family  Also of note, the patient does appear hyperverbal bordering on pressured and is circumstantial with select lines of questioning  PRNs overnight:  None   VS: Reviewed, within normal limits    Progress Toward Goals: Slow improvement    Psychiatric Review of Systems:  Behavior over the last 24 hours: improved  Sleep: normal and improving  Appetite: adequate, improving  Medication side effects: none verbalized  Medical ROS: Complete review of systems is negative except as noted above      Vital signs in last 24 hours:  Temp:  [96 2 °F (35 7 °C)-97 8 °F (36 6 °C)] 96 2 °F (35 7 °C)  HR:  [69-75] 69  Resp:  [16-18] 16  BP: (105-123)/(55-77) 105/55    Mental Status Exam:  Appearance:  alert, good eye contact, appears stated age, casually dressed, appropriate grooming and hygiene, obese and Wearing paper scrubs and glasses     Behavior:  calm, sitting comfortably and Superficially cooperative   Motor: no abnormal movements and normal gait and balance   Speech:  spontaneous, clear, increased rate, normal volume, hyperverbal and coherent   Mood:  "Lot better"   Affect:  mood-congruent, labile, tearful at times and brighter than previous   Thought Process:  generally linear and goal-directed but Circumstantial at times   Thought Content: mild paranoia, ruminating thoughts, Persecutory delusions regarding staff   Perceptual disturbances: denies current hallucinations and does not appear to be responding to internal stimuli at this time   Risk Potential: No active or passive suicidal or homicidal ideation was verbalized during interview   Cognition: oriented to self and situation, memory grossly intact, appears to be of average intelligence and cognition not formally tested   Insight:  Limited   Judgment: Improving     Current Medications:  Current Facility-Administered Medications   Medication Dose Route Frequency Provider Last Rate    albuterol  2 puff Inhalation Q6H PRN Josias Knutson MD      aluminum-magnesium hydroxide-simethicone  30 mL Oral Q4H PRN Josias Knutson MD      amLODIPine  5 mg Oral Daily Josias Knutson MD      artificial tear  1 application Both Eyes E5R PRN oJsias Knutson MD      LORazepam  2 mg Intramuscular Q6H PRN Max 4/day Anayeli Woo MD      And    haloperidol lactate  2 5 mg Intramuscular Q6H PRN Max 4/day Anayeli Woo MD      And    benztropine  0 5 mg Intramuscular Q6H PRN Max 4/day Anayeli Woo MD      haloperidol lactate  5 mg Intramuscular Q4H PRN Max 4/day Josias Knutson MD      And    LORazepam  2 mg Intramuscular Q4H PRN Max 4/day Ofelia Stock MD      And    benztropine  1 mg Intramuscular Q4H PRN Max 4/day Ofelia Stock MD      benztropine  1 mg Intramuscular Q4H PRN Max 6/day Ofelia Stock MD      benztropine  1 mg Oral Q4H PRN Max 6/day Ofelia Stock MD      buPROPion  150 mg Oral Daily Mildred Azul MD      chlorproMAZINE  100 mg Intramuscular Q4H PRN Max 3/day Marijean Andrez, DO      And    diphenhydrAMINE  50 mg Intramuscular Q4H PRN Max 3/day Marijean Andrez, DO      cholecalciferol  5,000 Units Oral Daily Ofelia Stock MD      colestipol  1 g Oral BID Darian Chapa PA-C      Diclofenac Sodium  2 g Topical 4x Daily Aria Goyal PA-C      divalproex sodium  500 mg Oral Q12H Soumya Mejía MD      estradiol  0 5 mg Oral Daily Ofelia Stock MD      fluticasone  2 puff Inhalation BID Darian Chapa PA-C      furosemide  20 mg Oral Daily Ofelia Stock MD      haloperidol  2 mg Oral Q4H PRN Max 6/day Ofelia Stock MD      haloperidol  5 mg Oral Q6H PRN Max 4/day Ofelia Stock MD      haloperidol  5 mg Oral Q4H PRN Max 4/day Ofelia Stock MD      hydrOXYzine HCL  25 mg Oral Q6H PRN Max 4/day Ofelia Stock MD      hydrOXYzine HCL  50 mg Oral Q4H PRN Max 4/day Ofelia Stock MD      Or    LORazepam  1 mg Intramuscular Q4H PRN Ofelia Stock MD      ibuprofen  400 mg Oral Q4H PRN Ofelia Stock MD      ibuprofen  600 mg Oral Q6H PRN Ofelia Stock MD      ibuprofen  800 mg Oral Q8H PRN Ofelia Stock MD      ketotifen  1 drop Both Eyes BID Ofelia Stock MD      levothyroxine  100 mcg Oral Early Morning Ofelia Stock MD      loperamide  2 mg Oral TID PRN Ofelia Stock MD      loratadine  10 mg Oral Daily Ofelia Stock MD      LORazepam  1 mg Oral Q4H PRN Max 6/day Ofelia Stock MD      Or    LORazepam  2 mg Intramuscular Q6H PRN Max 3/day Josias Knutson MD      losartan  50 mg Oral Daily Josias Knutson MD      lurasidone  60 mg Oral Daily With Bree Alvares MD      lurasidone  60 mg Oral Daily With [address], DO      melatonin  3 mg Oral HS PRN Josias Knutson MD      metoprolol tartrate  25 mg Oral Q12H [address] Josias Knutson MD      oxybutynin  5 mg Oral Daily Josias Knutson MD      pantoprazole  40 mg Oral BID [facility] Josias Knutson MD      pilocarpine  5 mg Oral TID Josias Knutson MD      polyethylene glycol  17 g Oral Daily PRN Josias Knutson MD      propranolol  10 mg Oral Q8H PRN Josias Knutson MD      rOPINIRole  0 5 mg Oral HS Josias Knutson MD      senna-docusate sodium  1 tablet Oral Daily PRN Josias Knutson MD      topiramate  100 mg Oral BID Josias Knutson MD         Behavioral Health Medications: all current active meds have been reviewed  Changes as in plan section above  Laboratory results:  I have personally reviewed all pertinent laboratory/tests results  No results found for this or any previous visit (from the past 48 hour(s))  This note has been constructed using a voice recognition system  There may be translation, syntax, or grammatical errors  If you have any questions, please contact the dictating author      Lane Mckeon DO  Psychiatry Residency, PGY-1

## 2022-07-21 NOTE — QUICK NOTE
Attempted to see patient to evaluate sutures for removal  When approached patient ignored me and continued to walk to her room  Patients nurse attempted to ask patient to be seen but patient ignored her as well   Will attempt to see patient tomorrow for suture removal

## 2022-07-21 NOTE — NURSING NOTE
Patient is tearful, upset about missing her cat  PRN 50mg atarax given for anxiety  Will continue to monitor

## 2022-07-21 NOTE — NURSING NOTE
Patient was out of her room this evening  She was sitting in the dayroom building a Erly puzzle with her 1-1 and talking  There was no shouting or agitation this evening and in markedly improved impulse control  Pleasant, appropriate, cooperative with all hs scheduled medications - oral medication and analgesic gel  She was set up on her CPAP  She was given 2 blankets as she is very cold with paper sheets especially during the night  Close proximity level of observation maintained without incident

## 2022-07-21 NOTE — PROGRESS NOTES
Alvarez Jones is a 49year old female who has a history of mental health  She is diagnosed with Schizoaffective Disorder, Bipolar Type, Borderline Personality Disorder  She was raised by her parents; her mother passed away August of 2021 and she is still grieving her death  Her mother was on hospice and she feels like she killed her due to the fact her mother got morphine every hour  She is able to recognize that no one else believes this  Her father is still alive  She has a sister and brother whom she does not see  She has her GED  She has never been arrested nor has she been in the [employer]  She does have any spiritual beliefs  She currently is being followed by [facility]  Her therapist from the team left and she just learned her case-manager  Is leaving  She struggles with loss of others and change  The saving lissette is she likes the supervisor Tish Cerda who was from her prior team  Patient presented labile, guarded at time as if she doesn't trust  She appeared to be honest and uncertain about what she was willing to commit to in terms of her aftercare  Patient reports her substance use history is as follows:  Alcohol use was from [age] years old  The last time she drank she had one wine cooler  She has been abstinent from alcohol for the past 3 years  Marijuana started at 16 and she currently has her medical marijuana card  She states she only takes one to two hits a night for pain and anxiety  On a rare occasion she will take a hit during the day when she is anxious  Last used 7/13/22  Cocaine use was from 15-30's snorting  Has not used since  Methamphetamine use began around 41 she is using 2-3 lines a day  Recently she has been cutting down to one line or skipping a day here or there  Her last use was 7/13/22  Oxcodone she has snorted from surgeries in the past with last use being 5 years  Acid she used sporadically in her 20-30's  She knows her substance use makes her mental health worse   She is frustrated because she does not believe she is allowed to deal with her trauma and has to stuff her feelings resulting in her substance use  She is not interested in rehab, she had been inpatient rehab in her youth twice  She has a cat that she needs to care for  We discussed having her see an outpatient dual therapist whom can also help her with her trauma  She was reluctant to commit stating it could only be on Wednesday or Thursday due to her work schedule  She does not do well in groups and she would prefer a female therapist  She believes the [facility] team just hired a D/A therapist  This writer's recommendation is that she does see a therapist either within the team or another provider  She will need to be drug tested to assist in her accountability  Patient is not interested in attending any support groups for substances in the community

## 2022-07-21 NOTE — NURSING NOTE
Patient upset that it "took so long" to be seen by medical for her stitches, so she ignored PA and ignored this RN when being spoken to  This RN went into patient room and explained that stitches will probably stay in for the time being anyway, and that if she is on good behavior she can have her clothes  Patient was tearful but agreed to be on good behavior  Given 5mg haldol because she was just given atarax for her anxiety and patient was agitated about the stitches  Also given 800mg ibuprofen for her "10/10 hand pain"  2 pairs of pants, 2 shirts, 2 pairs socks, 2 pair underwear, and 1 bra given  The rest put in storage with name on it  Patient thankful

## 2022-07-22 LAB
ATRIAL RATE: 53 BPM
P AXIS: 44 DEGREES
PR INTERVAL: 182 MS
QRS AXIS: -17 DEGREES
QRSD INTERVAL: 96 MS
QT INTERVAL: 432 MS
QTC INTERVAL: 405 MS
T WAVE AXIS: 27 DEGREES
VALPROATE SERPL-MCNC: 44.1 UG/ML (ref 50–120)
VENTRICULAR RATE: 53 BPM

## 2022-07-22 PROCEDURE — 93010 ELECTROCARDIOGRAM REPORT: CPT | Performed by: INTERNAL MEDICINE

## 2022-07-22 PROCEDURE — 80164 ASSAY DIPROPYLACETIC ACD TOT: CPT

## 2022-07-22 PROCEDURE — 99232 SBSQ HOSP IP/OBS MODERATE 35: CPT | Performed by: STUDENT IN AN ORGANIZED HEALTH CARE EDUCATION/TRAINING PROGRAM

## 2022-07-22 RX ADMIN — TOPIRAMATE 100 MG: 100 TABLET, FILM COATED ORAL at 17:55

## 2022-07-22 RX ADMIN — ESTRADIOL 0.5 MG: 1 TABLET ORAL at 08:27

## 2022-07-22 RX ADMIN — OXYBUTYNIN CHLORIDE 5 MG: 5 TABLET, EXTENDED RELEASE ORAL at 08:26

## 2022-07-22 RX ADMIN — MONTELUKAST SODIUM 1 G: 4 TABLET, CHEWABLE ORAL at 08:26

## 2022-07-22 RX ADMIN — LURASIDONE HYDROCHLORIDE 60 MG: 40 TABLET, FILM COATED ORAL at 16:55

## 2022-07-22 RX ADMIN — LURASIDONE HYDROCHLORIDE 60 MG: 40 TABLET, FILM COATED ORAL at 08:25

## 2022-07-22 RX ADMIN — DIVALPROEX SODIUM 500 MG: 500 TABLET, DELAYED RELEASE ORAL at 08:26

## 2022-07-22 RX ADMIN — PANTOPRAZOLE SODIUM 40 MG: 40 TABLET, DELAYED RELEASE ORAL at 06:35

## 2022-07-22 RX ADMIN — DICLOFENAC SODIUM 2 G: 10 GEL TOPICAL at 18:15

## 2022-07-22 RX ADMIN — MONTELUKAST SODIUM 1 G: 4 TABLET, CHEWABLE ORAL at 21:14

## 2022-07-22 RX ADMIN — ROPINIROLE HYDROCHLORIDE 0.5 MG: 0.5 TABLET, FILM COATED ORAL at 21:15

## 2022-07-22 RX ADMIN — METOPROLOL TARTRATE 25 MG: 25 TABLET, FILM COATED ORAL at 08:25

## 2022-07-22 RX ADMIN — AMLODIPINE BESYLATE 5 MG: 5 TABLET ORAL at 08:26

## 2022-07-22 RX ADMIN — BUPROPION HYDROCHLORIDE 150 MG: 150 TABLET, FILM COATED, EXTENDED RELEASE ORAL at 08:27

## 2022-07-22 RX ADMIN — DICLOFENAC SODIUM 2 G: 10 GEL TOPICAL at 08:32

## 2022-07-22 RX ADMIN — PILOCARPINE HYDRCHLORIDE 5 MG: 5 TABLET, FILM COATED ORAL at 21:14

## 2022-07-22 RX ADMIN — TOPIRAMATE 100 MG: 100 TABLET, FILM COATED ORAL at 08:25

## 2022-07-22 RX ADMIN — DICLOFENAC SODIUM 2 G: 10 GEL TOPICAL at 21:20

## 2022-07-22 RX ADMIN — PANTOPRAZOLE SODIUM 40 MG: 40 TABLET, DELAYED RELEASE ORAL at 16:55

## 2022-07-22 RX ADMIN — HYDROXYZINE HYDROCHLORIDE 50 MG: 50 TABLET, FILM COATED ORAL at 19:56

## 2022-07-22 RX ADMIN — METOPROLOL TARTRATE 25 MG: 25 TABLET, FILM COATED ORAL at 21:15

## 2022-07-22 RX ADMIN — Medication 5000 UNITS: at 08:28

## 2022-07-22 RX ADMIN — FUROSEMIDE 20 MG: 20 TABLET ORAL at 08:26

## 2022-07-22 RX ADMIN — LEVOTHYROXINE SODIUM 100 MCG: 100 TABLET ORAL at 06:35

## 2022-07-22 RX ADMIN — LORATADINE 10 MG: 10 TABLET ORAL at 08:26

## 2022-07-22 RX ADMIN — PILOCARPINE HYDRCHLORIDE 5 MG: 5 TABLET, FILM COATED ORAL at 08:26

## 2022-07-22 RX ADMIN — LOSARTAN POTASSIUM 50 MG: 50 TABLET, FILM COATED ORAL at 08:27

## 2022-07-22 RX ADMIN — DIVALPROEX SODIUM 500 MG: 500 TABLET, DELAYED RELEASE ORAL at 21:14

## 2022-07-22 RX ADMIN — PILOCARPINE HYDRCHLORIDE 5 MG: 5 TABLET, FILM COATED ORAL at 16:55

## 2022-07-22 NOTE — NURSING NOTE
Pt continues to be cooperative, visible and compliant with meds  Mood remains flat, currently cooperative with staff directions, maintained on one to one observation  Behaviorally stable  No notable deviation from previous documented condition

## 2022-07-22 NOTE — PLAN OF CARE
Problem: Prexisting or High Potential for Compromised Skin Integrity  Goal: Skin integrity is maintained or improved  Description: INTERVENTIONS:  - Identify patients at risk for skin breakdown  - Assess and monitor skin integrity  - Assess and monitor nutrition and hydration status  - Monitor labs   - Assess for incontinence   - Turn and reposition patient  - Assist with mobility/ambulation  - Relieve pressure over bony prominences  - Avoid friction and shearing  - Provide appropriate hygiene as needed including keeping skin clean and dry  - Evaluate need for skin moisturizer/barrier cream  - Collaborate with interdisciplinary team   - Patient/family teaching  - Consider wound care consult   Outcome: Progressing     Problem: Nutrition/Hydration-ADULT  Goal: Nutrient/Hydration intake appropriate for improving, restoring or maintaining nutritional needs  Description: Monitor and assess patient's nutrition/hydration status for malnutrition  Collaborate with interdisciplinary team and initiate plan and interventions as ordered  Monitor patient's weight and dietary intake as ordered or per policy  Utilize nutrition screening tool and intervene as necessary  Determine patient's food preferences and provide high-protein, high-caloric foods as appropriate       INTERVENTIONS:  - Monitor oral intake, urinary output, labs, and treatment plans  - Assess nutrition and hydration status and recommend course of action  - Evaluate amount of meals eaten  - Assist patient with eating if necessary   - Allow adequate time for meals  - Recommend/ encourage appropriate diets, oral nutritional supplements, and vitamin/mineral supplements  - Order, calculate, and assess calorie counts as needed  - Recommend, monitor, and adjust tube feedings and TPN/PPN based on assessed needs  - Assess need for intravenous fluids  - Provide specific nutrition/hydration education as appropriate  - Include patient/family/caregiver in decisions related to nutrition  Outcome: Progressing     Problem: Alteration in Thoughts and Perception  Goal: Treatment Goal: Gain control of psychotic behaviors/thinking, reduce/eliminate presenting symptoms and demonstrate improved reality functioning upon discharge  Outcome: Progressing  Goal: Verbalize thoughts and feelings  Description: Interventions:  - Promote a nonjudgmental and trusting relationship with the patient through active listening and therapeutic communication  - Assess patient's level of functioning, behavior and potential for risk  - Engage patient in 1 on 1 interactions  - Encourage patient to express fears, feelings, frustrations, and discuss symptoms    - [place] patient to reality, help patient recognize reality-based thinking   - Administer medications as ordered and assess for potential side effects  - Provide the patient education related to the signs and symptoms of the illness and desired effects of prescribed medications  Outcome: Progressing     Problem: Risk for Self Injury/Neglect  Goal: Treatment Goal: Remain safe during length of stay, learn and adopt new coping skills, and be free of self-injurious ideation, impulses and acts at the time of discharge  Outcome: Progressing  Goal: Verbalize thoughts and feelings  Description: Interventions:  - Assess and re-assess patient's lethality and potential for self-injury  - Engage patient in 1:1 interactions, daily, for a minimum of 15 minutes  - Encourage patient to express feelings, fears, frustrations, hopes  - Establish rapport/trust with patient   Outcome: Progressing  Goal: Refrain from harming self  Description: Interventions:  - Monitor patient closely, per order  - Develop a trusting relationship  - Supervise medication ingestion, monitor effects and side effects   Outcome: Progressing     Problem: Depression  Goal: Refrain from harming self  Description: Interventions:  - Monitor patient closely, per order   - Supervise medication ingestion, monitor effects and side effects   Outcome: Progressing     Problem: SAFETY, RESTRAINT - VIOLENT/SELF-DESTRUCTIVE  Goal: Remains free of harm/injury from restraints (Restraint for Violent/Self-Destructive Behavior)  Description: INTERVENTIONS:  - Instruct patient/family regarding restraint use   - Assess and monitor physiologic and psychological status   - Provide interventions and comfort measures to meet assessed patient needs   - Ensure continuous in person monitoring is provided   - Identify and implement measures to help patient regain control  - Assess readiness for release of restraint  Outcome: Progressing  Goal: Returns to optimal restraint-free functioning  Description: INTERVENTIONS:  - Assess the patient's behavior and symptoms that indicate continued need for restraint  - Identify and implement measures to help patient regain control  - Assess readiness for release of restraint   Outcome: Progressing     Problem: INVOLUNTARY ADMIT  Goal: Will cooperate with staff recommendations and doctor's orders and will demonstrate appropriate behavior  Description: INTERVENTIONS:  - Treat underlying conditions and offer medication as ordered  - Educate regarding involuntary admission procedures and rules  - Utilize positive consistent limit setting strategies to support patient and staff safety  Outcome: Progressing

## 2022-07-22 NOTE — PROGRESS NOTES
Progress Note - Behavioral Health   Enamorado Socorro 49 y o  female MRN: [id number]  Unit/Bed#: [facility] 344-01 Encounter: [id number]    Assessment/Plan   Principal Problem:    Schizoaffective disorder, bipolar type ([place] [id number] )  Active Problems:    Benign essential hypertension    Edema    Hypothyroidism    MAG (obstructive sleep apnea)    Overactive bladder    Marijuana abuse    COPD (chronic obstructive pulmonary disease) ([facility])    Class 3 severe obesity due to excess calories with serious comorbidity and body mass index (BMI) of 40 0 to 44 9 in adult [facility])    Medical clearance for psychiatric admission    GERD (gastroesophageal reflux disease)    Diarrhea    Borderline personality disorder ([place] [address] )    Recommended Treatment:   Wellbutrin XL 150mg for depression  Depakote 500 mg b i d  for mood stabilization  Trough VPA level to be drawn tonight  Titrate Depakote based on level  Latuda 60 mg b i d  for mood stabilization  Topamax 100 mg b i d  for migraines  All current active medications have been reviewed  Encourage group therapy, milieu therapy and occupational therapy  Adjust to continual observation while awake only  Disposition: Plan for discharge on 7/26/22 if stability maintains  Legal Status: 201    ----------------------------------------      Subjective:  Per nursing report patient had outburst yesterday in which she became upset and frustrated with another peer; said peer entered small TV room and made volume loud  Patient stormed out, threw her book, began yelling  She received p r n  of Haldol 5 mg, Atarax 50 mg, and Ativan 2 mg IM last night for her agitation  She was able to be redirected to her room, later apologizing for behaviors  On examination today, she states that her mood is missing my cat  She reports having decreased energy today and attributes this to the poor sleep that she experienced last night    She perseverated on events that occurred last night, externalizing blame on peer for her outburst and reports that this was a trigger for her  She was preoccupied with this event "being held against me"  She was tearful during the encounter and had decreased eye contact  She was able to report feeling depressed and sad about her pet cat and not being able to see him though did show future orientation about wishing to return back to work and to spend time with her friends  She denied any SI, HI, A/VH  Reported she is tolerating the medications well and does not have any side effects at this time  Behavior over the last 24 hours:  regressed  Sleep: decreased  Appetite: improving  Medication side effects: No  ROS: no complaints    Mental Status Evaluation:  Appearance:  casually dressed, adequate grooming, overweight   Behavior:  cooperative, decreased eye contact   Speech:  normal rate and volume   Mood:  "better"   Affect:  constricted, tearful at times   Thought Process:  organized, logical, coherent, goal directed   Associations: intact associations   Thought Content:  some persecutory delusions towards staff   Perceptual Disturbances: no auditory hallucinations, no visual hallucinations, does not appear responding to internal stimuli   Risk Potential: Suicidal ideation - None at present  Homicidal ideation - None at present  Potential for aggression - Yes, due to poor impulse control   Sensorium:  oriented to person, place and time/date   Memory:  recent and remote memory grossly intact   Consciousness:  alert and awake   Attention/Concentration: attention span and concentration are age appropriate   Insight:  limited   Judgment: limited   Gait/Station: normal gait/station   Motor Activity: no abnormal movements     Medications: all current active meds have been reviewed and continue current psychiatric medications    Current Facility-Administered Medications   Medication Dose Route Frequency Provider Last Rate    albuterol  2 puff Inhalation Q6H PRN Lisha Gutierrez MD     [facility] aluminum-magnesium hydroxide-simethicone  30 mL Oral Q4H PRN Fatmata Tracy MD      amLODIPine  5 mg Oral Daily Fatmata Tracy MD      artificial tear  1 application Both Eyes P6B PRN Fatmata Tracy MD      LORazepam  2 mg Intramuscular Q6H PRN Max 4/day Art Ramirez MD      And    haloperidol lactate  2 5 mg Intramuscular Q6H PRN Max 4/day Art Ramirez MD      And    benztropine  0 5 mg Intramuscular Q6H PRN Max 4/day Art Ramirez MD      haloperidol lactate  5 mg Intramuscular Q4H PRN Max 4/day Fatmata Tracy MD      And    LORazepam  2 mg Intramuscular Q4H PRN Max 4/day Fatmata Tracy MD      And    benztropine  1 mg Intramuscular Q4H PRN Max 4/day Fatmata Tracy MD      benztropine  1 mg Intramuscular Q4H PRN Max 6/day Fatmata Tracy MD      benztropine  1 mg Oral Q4H PRN Max 6/day Fatmata Tracy MD      buPROPion  150 mg Oral Daily Art Ramirez MD      chlorproMAZINE  100 mg Intramuscular Q4H PRN Max 3/day Larri Spore, DO      And    diphenhydrAMINE  50 mg Intramuscular Q4H PRN Max 3/day Larri Spore, DO      cholecalciferol  5,000 Units Oral Daily Fatmata Tracy MD      colestipol  1 g Oral BID Ashraf Tahoma, PA-C      Diclofenac Sodium  2 g Topical 4x Daily Aria Goyal PA-C      divalproex sodium  500 mg Oral Q12H Lisa Saeed MD      estradiol  0 5 mg Oral Daily Fatmata Tracy MD      fluticasone  2 puff Inhalation BID Ashraf Tahoma, PA-C      furosemide  20 mg Oral Daily Fatmata Tracy MD      haloperidol  2 mg Oral Q4H PRN Max 6/day Fatmata Tracy MD      haloperidol  5 mg Oral Q6H PRN Max 4/day Fatmata Tracy MD      haloperidol  5 mg Oral Q4H PRN Max 4/day Fatmata Tracy MD      hydrOXYzine HCL  25 mg Oral Q6H PRN Max 4/day Fatmata Tracy MD      hydrOXYzine HCL  50 mg Oral Q4H PRN Max 4/day Fatmata Tracy MD      Or    LORazepam  1 mg Intramuscular Q4H PRN Natalia Pizarro MD      ibuprofen  400 mg Oral Q4H PRN Natalia Pizarro MD      ibuprofen  600 mg Oral Q6H PRN Natalia Pizarro MD      ibuprofen  800 mg Oral Q8H PRN Natalia Pizarro MD      ketotifen  1 drop Both Eyes BID Natalia Pizarro MD      levothyroxine  100 mcg Oral Early Morning Natalia Pizarro MD      loperamide  2 mg Oral TID PRN Natalia Pizarro MD      loratadine  10 mg Oral Daily Natalia Pizarro MD      LORazepam  1 mg Oral Q4H PRN Max 6/day Natalia Pizarro MD      Or    LORazepam  2 mg Intramuscular Q6H PRN Max 3/day Natalia Pizarro MD      losartan  50 mg Oral Daily Natalia Pizarro MD      lurasidone  60 mg Oral Daily With Santana Garrison MD      lurasidone  60 mg Oral Daily With [address], DO      melatonin  3 mg Oral HS PRN Natalia Pizarro MD      metoprolol tartrate  25 mg Oral Q12H [address] MD      oxybutynin  5 mg Oral Daily Natalia Pizarro MD      pantoprazole  40 mg Oral BID [facility] Natalia Pizarro MD      pilocarpine  5 mg Oral TID Natalia Pizarro MD      polyethylene glycol  17 g Oral Daily PRN Natalia Pizarro MD      propranolol  10 mg Oral Q8H PRN Natalia Pizarro MD      rOPINIRole  0 5 mg Oral HS Natalia Pizarro MD      senna-docusate sodium  1 tablet Oral Daily PRN Natalia Pizarro MD      topiramate  100 mg Oral BID Natalia Pizarro MD         Labs: I have personally reviewed all pertinent laboratory/tests results  Results from the past 24 hours: No results found for this or any previous visit (from the past 24 hour(s))  Progress Toward Goals: progressing    Risks / Benefits of Treatment:    Risks, benefits, and possible side effects of medications explained to patient and patient verbalizes understanding and agreement for treatment  Counseling / Coordination of Care:     Total floor / unit time spent today 20 minutes  Greater than 50% of total time was spent with the patient and / or family counseling and / or coordination of care  A description of counseling / coordination of care:  Patient's progress discussed with staff in treatment team meeting  Medications, treatment progress and treatment plan reviewed with patient  Educated on importance of medication and treatment compliance  Reassurance and supportive therapy provided  Encouraged participation in milieu and group therapy on the unit      Carlos Herrera MD 07/22/22

## 2022-07-22 NOTE — NURSING NOTE
PT approached nursing station requesting for PRN for anxiety, PRN Atarax 50mg given at 1956  Will continue to monitor

## 2022-07-22 NOTE — PROGRESS NOTES
07/22/22 1243   Team Meeting   Meeting Type Daily Rounds   Team Members Present   Team Members Present Physician;Nurse;   Physician Team Member Dr Janis Dodson Team Member [facility] Management Team Member Sunitha   Patient/Family Present   Patient Present No   Patient's Family Present No   Pt is medication compliant  Agitated with a peer overnight and required PRN Atarax and Haldol  Pt's VPA level to be checked today  Discharge Tuesday pending behavioral control

## 2022-07-22 NOTE — PROGRESS NOTES
07/21/22 0827   Team Meeting   Meeting Type Daily Rounds   Team Members Present   Team Members Present Physician;Nurse;   Physician Team Member Dr Jil Barrios Team Member RANDOLPH JOY [facility] Management Team Member Sunitha   Patient/Family Present   Patient Present No   Patient's Family Present No   Rounding note 7/21  Pt is medication compliant  Pt denying symptoms  Irritable at times  Discharge pending for Tuesday if no behavioral concerns

## 2022-07-22 NOTE — NURSING NOTE
Patient maintains 1:1 observation staff  Patient left the dayroom in a rush loudly after having a dispute over the television with a peer  She also threw a book to the ground and was redirected to her room  Patient began pacing halls and yelling  Nurse went to patient to check to check in with them  Patient stated that she wanted medication to help relax  Patient administered Im Ativan at this time without incident  Patient shortly after acknowledges her behavior and apologized  Patient denies SI/HI/AVH   Will continue to monitor and support

## 2022-07-22 NOTE — QUICK NOTE
· Wound on left hand with sutures evaluated  · Appears well-healed  · Sutures removed  · Patient tolerated well  · Encourage good hand hygiene/cleansing

## 2022-07-22 NOTE — NURSING NOTE
Pt denies SI, HI and A/V hallucinations; awoke without issue by RN, mild irritability, compliant with meds, poor concentration  Flat, depressed affect, concrete responses, isolative  Treatment plan is effective

## 2022-07-23 PROCEDURE — 99232 SBSQ HOSP IP/OBS MODERATE 35: CPT | Performed by: PSYCHIATRY & NEUROLOGY

## 2022-07-23 RX ADMIN — ESTRADIOL 0.5 MG: 1 TABLET ORAL at 08:47

## 2022-07-23 RX ADMIN — MONTELUKAST SODIUM 1 G: 4 TABLET, CHEWABLE ORAL at 08:49

## 2022-07-23 RX ADMIN — LOSARTAN POTASSIUM 50 MG: 50 TABLET, FILM COATED ORAL at 08:47

## 2022-07-23 RX ADMIN — IBUPROFEN 600 MG: 600 TABLET ORAL at 16:05

## 2022-07-23 RX ADMIN — PILOCARPINE HYDRCHLORIDE 5 MG: 5 TABLET, FILM COATED ORAL at 08:48

## 2022-07-23 RX ADMIN — PILOCARPINE HYDRCHLORIDE 5 MG: 5 TABLET, FILM COATED ORAL at 16:04

## 2022-07-23 RX ADMIN — AMLODIPINE BESYLATE 5 MG: 5 TABLET ORAL at 08:48

## 2022-07-23 RX ADMIN — HALOPERIDOL LACTATE 5 MG: 5 INJECTION, SOLUTION INTRAMUSCULAR at 09:07

## 2022-07-23 RX ADMIN — IBUPROFEN 800 MG: 400 TABLET ORAL at 12:41

## 2022-07-23 RX ADMIN — Medication 5000 UNITS: at 08:47

## 2022-07-23 RX ADMIN — FUROSEMIDE 20 MG: 20 TABLET ORAL at 08:48

## 2022-07-23 RX ADMIN — DIVALPROEX SODIUM 500 MG: 500 TABLET, DELAYED RELEASE ORAL at 08:48

## 2022-07-23 RX ADMIN — LURASIDONE HYDROCHLORIDE 60 MG: 40 TABLET, FILM COATED ORAL at 08:47

## 2022-07-23 RX ADMIN — LORAZEPAM 2 MG: 2 INJECTION INTRAMUSCULAR at 09:06

## 2022-07-23 RX ADMIN — MONTELUKAST SODIUM 1 G: 4 TABLET, CHEWABLE ORAL at 20:31

## 2022-07-23 RX ADMIN — METOPROLOL TARTRATE 25 MG: 25 TABLET, FILM COATED ORAL at 20:31

## 2022-07-23 RX ADMIN — DIVALPROEX SODIUM 500 MG: 500 TABLET, DELAYED RELEASE ORAL at 20:31

## 2022-07-23 RX ADMIN — ROPINIROLE HYDROCHLORIDE 0.5 MG: 0.5 TABLET, FILM COATED ORAL at 21:25

## 2022-07-23 RX ADMIN — PANTOPRAZOLE SODIUM 40 MG: 40 TABLET, DELAYED RELEASE ORAL at 06:46

## 2022-07-23 RX ADMIN — LURASIDONE HYDROCHLORIDE 60 MG: 40 TABLET, FILM COATED ORAL at 16:04

## 2022-07-23 RX ADMIN — OXYBUTYNIN CHLORIDE 5 MG: 5 TABLET, EXTENDED RELEASE ORAL at 08:48

## 2022-07-23 RX ADMIN — BUPROPION HYDROCHLORIDE 150 MG: 150 TABLET, FILM COATED, EXTENDED RELEASE ORAL at 08:47

## 2022-07-23 RX ADMIN — METOPROLOL TARTRATE 25 MG: 25 TABLET, FILM COATED ORAL at 08:48

## 2022-07-23 RX ADMIN — LEVOTHYROXINE SODIUM 100 MCG: 100 TABLET ORAL at 06:46

## 2022-07-23 RX ADMIN — LORATADINE 10 MG: 10 TABLET ORAL at 08:48

## 2022-07-23 RX ADMIN — PANTOPRAZOLE SODIUM 40 MG: 40 TABLET, DELAYED RELEASE ORAL at 16:04

## 2022-07-23 RX ADMIN — DIPHENHYDRAMINE HYDROCHLORIDE 50 MG: 50 INJECTION, SOLUTION INTRAMUSCULAR; INTRAVENOUS at 09:07

## 2022-07-23 RX ADMIN — TOPIRAMATE 100 MG: 100 TABLET, FILM COATED ORAL at 08:48

## 2022-07-23 RX ADMIN — FLUTICASONE PROPIONATE 2 PUFF: 110 AEROSOL, METERED RESPIRATORY (INHALATION) at 20:31

## 2022-07-23 RX ADMIN — PILOCARPINE HYDRCHLORIDE 5 MG: 5 TABLET, FILM COATED ORAL at 20:31

## 2022-07-23 RX ADMIN — DICLOFENAC SODIUM 2 G: 10 GEL TOPICAL at 21:25

## 2022-07-23 NOTE — PLAN OF CARE
Problem: Prexisting or High Potential for Compromised Skin Integrity  Goal: Skin integrity is maintained or improved  Description: INTERVENTIONS:  - Identify patients at risk for skin breakdown  - Assess and monitor skin integrity  - Assess and monitor nutrition and hydration status  - Monitor labs   - Assess for incontinence   - Turn and reposition patient  - Assist with mobility/ambulation  - Relieve pressure over bony prominences  - Avoid friction and shearing  - Provide appropriate hygiene as needed including keeping skin clean and dry  - Evaluate need for skin moisturizer/barrier cream  - Collaborate with interdisciplinary team   - Patient/family teaching  - Consider wound care consult   Outcome: Progressing     Problem: Nutrition/Hydration-ADULT  Goal: Nutrient/Hydration intake appropriate for improving, restoring or maintaining nutritional needs  Description: Monitor and assess patient's nutrition/hydration status for malnutrition  Collaborate with interdisciplinary team and initiate plan and interventions as ordered  Monitor patient's weight and dietary intake as ordered or per policy  Utilize nutrition screening tool and intervene as necessary  Determine patient's food preferences and provide high-protein, high-caloric foods as appropriate       INTERVENTIONS:  - Monitor oral intake, urinary output, labs, and treatment plans  - Assess nutrition and hydration status and recommend course of action  - Evaluate amount of meals eaten  - Assist patient with eating if necessary   - Allow adequate time for meals  - Recommend/ encourage appropriate diets, oral nutritional supplements, and vitamin/mineral supplements  - Order, calculate, and assess calorie counts as needed  - Recommend, monitor, and adjust tube feedings and TPN/PPN based on assessed needs  - Assess need for intravenous fluids  - Provide specific nutrition/hydration education as appropriate  - Include patient/family/caregiver in decisions related to nutrition  Outcome: Progressing     Problem: Alteration in Thoughts and Perception  Goal: Treatment Goal: Gain control of psychotic behaviors/thinking, reduce/eliminate presenting symptoms and demonstrate improved reality functioning upon discharge  Outcome: Progressing  Goal: Verbalize thoughts and feelings  Description: Interventions:  - Promote a nonjudgmental and trusting relationship with the patient through active listening and therapeutic communication  - Assess patient's level of functioning, behavior and potential for risk  - Engage patient in 1 on 1 interactions  - Encourage patient to express fears, feelings, frustrations, and discuss symptoms    - [place] patient to reality, help patient recognize reality-based thinking   - Administer medications as ordered and assess for potential side effects  - Provide the patient education related to the signs and symptoms of the illness and desired effects of prescribed medications  Outcome: Progressing     Problem: Ineffective Coping  Goal: Identifies ineffective coping skills  Outcome: Progressing     Problem: Depression  Goal: Refrain from harming self  Description: Interventions:  - Monitor patient closely, per order   - Supervise medication ingestion, monitor effects and side effects   Outcome: Progressing     Problem: Ineffective Coping  Goal: Participates in unit activities  Description: Interventions:  - Provide therapeutic environment   - Provide required programming   - Redirect inappropriate behaviors   Outcome: Progressing     Problem: SAFETY, RESTRAINT - VIOLENT/SELF-DESTRUCTIVE  Goal: Remains free of harm/injury from restraints (Restraint for Violent/Self-Destructive Behavior)  Description: INTERVENTIONS:  - Instruct patient/family regarding restraint use   - Assess and monitor physiologic and psychological status   - Provide interventions and comfort measures to meet assessed patient needs   - Ensure continuous in person monitoring is provided   - Identify and implement measures to help patient regain control  - Assess readiness for release of restraint  Outcome: Progressing  Goal: Returns to optimal restraint-free functioning  Description: INTERVENTIONS:  - Assess the patient's behavior and symptoms that indicate continued need for restraint  - Identify and implement measures to help patient regain control  - Assess readiness for release of restraint   Outcome: Progressing     Problem: INVOLUNTARY ADMIT  Goal: Will cooperate with staff recommendations and doctor's orders and will demonstrate appropriate behavior  Description: INTERVENTIONS:  - Treat underlying conditions and offer medication as ordered  - Educate regarding involuntary admission procedures and rules  - Utilize positive consistent limit setting strategies to support patient and staff safety  Outcome: Progressing     Problem: DISCHARGE PLANNING - CARE MANAGEMENT  Goal: Discharge to post-acute care or home with appropriate resources  Description: INTERVENTIONS:  - Conduct assessment to determine patient/family and health care team treatment goals, and need for post-acute services based on payer coverage, community resources, and patient preferences, and barriers to discharge  - Address psychosocial, clinical, and financial barriers to discharge as identified in assessment in conjunction with the patient/family and health care team  - Arrange appropriate level of post-acute services according to patients   needs and preference and payer coverage in collaboration with the physician and health care team  - Communicate with and update the patient/family, physician, and health care team regarding progress on the discharge plan  - Arrange appropriate transportation to post-acute venues  Outcome: Progressing

## 2022-07-23 NOTE — NURSING NOTE
Patient was tearful crying in a corner stating "no one cares about me, my pain is really bad and no one is helping me " Pt was offered prn medications to assist and she refused them  Pt became increasingly agitated and refused her scheduled medications as well  She became disruptive during group and would not follow redirection  Security was called and pt was escorted to her room and given PRN haldol 5 mg, ativan 2 mg and 50 mg of benadryl IM for agitation and anxiety  Will monitor

## 2022-07-23 NOTE — NURSING NOTE
PT was calm and cooperative, continues on 1:1 close observation without any behavioral issue for this shift, PRN Atarax 50mg given for anxiety and was effective  Meds and meal compliant

## 2022-07-23 NOTE — NURSING NOTE
Patient became tearful because she ordered a cheeseburger and then wanted a regular hamburger instead  She went into a corner in her bedroom and started crying  Pt refused evening Topamax  A new burger was brought for the pt  Pt was able to eat dinner  Will continue to monitor

## 2022-07-23 NOTE — NURSING NOTE
PRN medications were effective  Pt appears calmer  She eventually took her scheduled medications with encouragement  Remains on continual observation without further incident

## 2022-07-24 PROCEDURE — 94760 N-INVAS EAR/PLS OXIMETRY 1: CPT

## 2022-07-24 PROCEDURE — 99232 SBSQ HOSP IP/OBS MODERATE 35: CPT | Performed by: PSYCHIATRY & NEUROLOGY

## 2022-07-24 RX ORDER — DIVALPROEX SODIUM 250 MG/1
250 TABLET, EXTENDED RELEASE ORAL ONCE
Status: DISCONTINUED | OUTPATIENT
Start: 2022-07-24 | End: 2022-07-24

## 2022-07-24 RX ORDER — DIVALPROEX SODIUM 250 MG/1
250 TABLET, EXTENDED RELEASE ORAL ONCE
Status: COMPLETED | OUTPATIENT
Start: 2022-07-24 | End: 2022-07-24

## 2022-07-24 RX ADMIN — LORAZEPAM 2 MG: 2 INJECTION INTRAMUSCULAR at 16:45

## 2022-07-24 RX ADMIN — METOPROLOL TARTRATE 25 MG: 25 TABLET, FILM COATED ORAL at 10:05

## 2022-07-24 RX ADMIN — BUPROPION HYDROCHLORIDE 150 MG: 150 TABLET, FILM COATED, EXTENDED RELEASE ORAL at 10:03

## 2022-07-24 RX ADMIN — Medication 5000 UNITS: at 10:04

## 2022-07-24 RX ADMIN — OXYBUTYNIN CHLORIDE 5 MG: 5 TABLET, EXTENDED RELEASE ORAL at 10:05

## 2022-07-24 RX ADMIN — TOPIRAMATE 100 MG: 100 TABLET, FILM COATED ORAL at 10:04

## 2022-07-24 RX ADMIN — DICLOFENAC SODIUM 2 G: 10 GEL TOPICAL at 12:57

## 2022-07-24 RX ADMIN — FUROSEMIDE 20 MG: 20 TABLET ORAL at 10:05

## 2022-07-24 RX ADMIN — MONTELUKAST SODIUM 1 G: 4 TABLET, CHEWABLE ORAL at 20:54

## 2022-07-24 RX ADMIN — DIPHENHYDRAMINE HYDROCHLORIDE 50 MG: 50 INJECTION, SOLUTION INTRAMUSCULAR; INTRAVENOUS at 16:45

## 2022-07-24 RX ADMIN — LURASIDONE HYDROCHLORIDE 60 MG: 40 TABLET, FILM COATED ORAL at 10:10

## 2022-07-24 RX ADMIN — ROPINIROLE HYDROCHLORIDE 0.5 MG: 0.5 TABLET, FILM COATED ORAL at 21:48

## 2022-07-24 RX ADMIN — AMLODIPINE BESYLATE 5 MG: 5 TABLET ORAL at 10:05

## 2022-07-24 RX ADMIN — IBUPROFEN 800 MG: 400 TABLET ORAL at 10:17

## 2022-07-24 RX ADMIN — LORATADINE 10 MG: 10 TABLET ORAL at 10:05

## 2022-07-24 RX ADMIN — DIVALPROEX SODIUM 250 MG: 250 TABLET, EXTENDED RELEASE ORAL at 17:32

## 2022-07-24 RX ADMIN — LEVOTHYROXINE SODIUM 100 MCG: 100 TABLET ORAL at 06:46

## 2022-07-24 RX ADMIN — PANTOPRAZOLE SODIUM 40 MG: 40 TABLET, DELAYED RELEASE ORAL at 17:28

## 2022-07-24 RX ADMIN — LOSARTAN POTASSIUM 50 MG: 50 TABLET, FILM COATED ORAL at 10:04

## 2022-07-24 RX ADMIN — PILOCARPINE HYDRCHLORIDE 5 MG: 5 TABLET, FILM COATED ORAL at 17:34

## 2022-07-24 RX ADMIN — DICLOFENAC SODIUM 2 G: 10 GEL TOPICAL at 10:18

## 2022-07-24 RX ADMIN — PANTOPRAZOLE SODIUM 40 MG: 40 TABLET, DELAYED RELEASE ORAL at 06:46

## 2022-07-24 RX ADMIN — MONTELUKAST SODIUM 1 G: 4 TABLET, CHEWABLE ORAL at 10:08

## 2022-07-24 RX ADMIN — LURASIDONE HYDROCHLORIDE 60 MG: 40 TABLET, FILM COATED ORAL at 17:28

## 2022-07-24 RX ADMIN — PILOCARPINE HYDRCHLORIDE 5 MG: 5 TABLET, FILM COATED ORAL at 10:08

## 2022-07-24 RX ADMIN — PILOCARPINE HYDRCHLORIDE 5 MG: 5 TABLET, FILM COATED ORAL at 20:54

## 2022-07-24 RX ADMIN — LORAZEPAM 1 MG: 1 TABLET ORAL at 14:46

## 2022-07-24 RX ADMIN — ESTRADIOL 0.5 MG: 1 TABLET ORAL at 10:04

## 2022-07-24 RX ADMIN — DIVALPROEX SODIUM 750 MG: 500 TABLET, DELAYED RELEASE ORAL at 20:55

## 2022-07-24 RX ADMIN — TOPIRAMATE 100 MG: 100 TABLET, FILM COATED ORAL at 17:32

## 2022-07-24 NOTE — PROGRESS NOTES
Progress Note - Behavioral Health   Cass Andujar 49 y o  female MRN: [id number]  Unit/Bed#: [facility] 344-01 Encounter: [id number]    Assessment/Plan   Principal Problem:    Schizoaffective disorder, bipolar type ([facility] )  Active Problems:    Benign essential hypertension    Edema    Hypothyroidism    MAG (obstructive sleep apnea)    Overactive bladder    Marijuana abuse    COPD (chronic obstructive pulmonary disease) ([facility])    Class 3 severe obesity due to excess calories with serious comorbidity and body mass index (BMI) of 40 0 to 44 9 in adult [facility])    Medical clearance for psychiatric admission    GERD (gastroesophageal reflux disease)    Diarrhea    Borderline personality disorder ([place] [address] )      Recommended Treatment:   · Increased Depakote to 750 mg b i d   · Depakote level 07/23/2022 was 41 1  · Continue Wellbutrin  mg daily for depression  · Continue Latuda 60 mg b i d  For mood stabilization  · Continue Topamax 100 mg b i d  For migraines  · Medical management per  IM  · Continue with group therapy, milieu therapy and occupational therapy  · Continue frequent safety checks and vitals per unit protocol  · Case discussed with treatment team   · Risks, benefits and possible side effects of Medications: Risks, benefits, and possible side effects of medications have been explained to the patient, who verbalizes understanding    ------------------------------------------------------------    Cass Andujar is a 49 y o   female with a past psychiatric history of Schizoaffective disorder, anxiety, Borderline Personality Disorder, multiple prior psychiatric admissions, h/o prior SA, h/o self-injurious behavior who presented to [facility] ED due to SA via Depakote OD (reportedly 14 pills) after taking street meth and medical marijuana   Reportedly, as per ED note, the OD was triggered by missing her mother who passed away earlier this year and being estranged from her father as well as ACT  who was reportedly leaving  The patient was admitted to the inpatient psychiatric unit at 3B (signed 201) after medical clearance      At this time the patient remains on the inpatient unit undergoing medication titration, group therapy, milieu therapy for schizoaffective disorder, bipolar type  Subjective:     Per nursing report, Linette Soto has made small improvements in last 24 hours  Per nursing report yesterday afternoon the patient was noted to be labile and irritable  Per nursing  report at the time of dinner Linette Soto became tearful when she ordered a cheeseburger and then wanted a regular hamburger instead  She returned to her room, went into a corner, and started to cry  A new burger was brought for the patient and she was able to eat dinner  In the evening the patient was noted to be in the TV room with peers  She was noted to be polite, but suspicious towards others  She accepted all of her evening meds without issue  She received her CPAP machine last night  No acute events overnight  Per nursing report the patient remains cooperative, medication and meal compliant  Patient was seen examined in her room  This morning the patient appears improved in affect and she is less irritable  She is less somatically preoccupied today about her arm pain  She remains soft and scant in speech  On assessment the patient reports feeling okay  She states that she has been spending her time on the unit completing puzzles, watching TV, and passing the time  Today on assessment she is perseverative about discharge and states that she wants to go home to be with her cat  Medication management was discussed with the patient, who is agreeable to an increase in Depakote for mood stabilization  At this time the patient is without SI, HI, AVH  Progress Toward Goals:     Over the past 24 hours Linette Soto has made small improvements on the inpatient psychiatric unit    This morning the patient appears approved and affect and is less irritable  She is less somatically preoccupied today about her arm pain she remains soft and scant and speech  Psychiatric Review of Systems:  Behavior over the last 24 hours: improved  Sleep: improving  Appetite: normal compared to baseline  Medication side effects: none verbalized  ROS: Complete review of systems is negative except as noted above  Vital signs in last 24 hours:  Temp:  [96 7 °F (35 9 °C)-97 3 °F (36 3 °C)] 96 7 °F (35 9 °C)  HR:  [60-73] 73  Resp:  [16] 16  BP: (106-126)/(53-68) 120/68    Mental Status Exam:  Appearance:  Patient is a 41-year-old overtly  female    Alert, appears stated age, casually dressed, disheveled, BMI of 45, in no acute distress, limited eye contact   Behavior:  Calm, more cooperative with interview today, remains guarded at times   Motor: No abnormal movements, normal gait and balance   Speech:  Soft and scant   Mood:  "Okay" - Today the patient is less irritable   Affect:  constricted and irritable   Thought Process:  Goal directed, perseverative   Thought Content: no verbalized delusions or overt paranoia, somatic preoccupation   Perceptual disturbances: denies current hallucinations and does not appear to be responding to internal stimuli at this time   Risk Potential: No active suicidal ideation, No active homicidal ideation   Cognition: oriented to self and situation, memory grossly intact, age-appropriate attention span and concentration and cognition not formally tested   Insight:  Limited   Judgment: Improving     Current Medications:  Current Facility-Administered Medications   Medication Dose Route Frequency Provider Last Rate    albuterol  2 puff Inhalation Q6H PRN Fatmata Tracy MD      aluminum-magnesium hydroxide-simethicone  30 mL Oral Q4H PRN Fatmata Tracy MD      amLODIPine  5 mg Oral Daily Fatmata Tracy MD      artificial tear  1 application Both Eyes L7E PRN MD Jayden Fry Abt LORazepam  2 mg Intramuscular Q6H PRN Max 4/day Benny Nolan MD      And    haloperidol lactate  2 5 mg Intramuscular Q6H PRN Max 4/day Benny Nolan MD      And    benztropine  0 5 mg Intramuscular Q6H PRN Max 4/day Benny Nolan MD      haloperidol lactate  5 mg Intramuscular Q4H PRN Max 4/day Rupinder Cotton MD      And    LORazepam  2 mg Intramuscular Q4H PRN Max 4/day Rupinder Cotton MD      And    benztropine  1 mg Intramuscular Q4H PRN Max 4/day Rupinder Cotton MD      benztropine  1 mg Intramuscular Q4H PRN Max 6/day Rupinder Cotton MD      benztropine  1 mg Oral Q4H PRN Max 6/day Rupinder Cotton MD      buPROPion  150 mg Oral Daily Benny Nolan MD      chlorproMAZINE  100 mg Intramuscular Q4H PRN Max 3/day Sanhco Robin DO      And    diphenhydrAMINE  50 mg Intramuscular Q4H PRN Max 3/day Sancho Robin DO      cholecalciferol  5,000 Units Oral Daily Rupinder Cotton MD      colestipol  1 g Oral BID Myla Rump, PAFER      Diclofenac Sodium  2 g Topical 4x Daily Aria Goyal PA-C      divalproex sodium  250 mg Oral Once Beth Leblanc MD      divalproex sodium  750 mg Oral Q12H [address] Beth Leblanc MD      estradiol  0 5 mg Oral Daily Rupinder Cotton MD      fluticasone  2 puff Inhalation BID Myla RumpERICKSON      furosemide  20 mg Oral Daily Rupinder Cotton MD      haloperidol  2 mg Oral Q4H PRN Max 6/day Rupinder Cotton MD      haloperidol  5 mg Oral Q6H PRN Max 4/day Rupinder Cotton MD      haloperidol  5 mg Oral Q4H PRN Max 4/day Rupinder Cotton MD      hydrOXYzine HCL  25 mg Oral Q6H PRN Max 4/day Rupinder Cotton MD      hydrOXYzine HCL  50 mg Oral Q4H PRN Max 4/day Rupinder Cotton MD      Or    LORazepam  1 mg Intramuscular Q4H PRN Rupinder Cotton MD      ibuprofen  400 mg Oral Q4H PRN Rupinder Cotton MD      ibuprofen  600 mg Oral Q6H PRN Natalia Pizarro MD      ibuprofen  800 mg Oral Q8H PRN Natalia Pizarro MD      ketotifen  1 drop Both Eyes BID Natalia Pizarro MD      levothyroxine  100 mcg Oral Early Morning Natalia Pizarro MD      loperamide  2 mg Oral TID PRN Natalia Pizarro MD      loratadine  10 mg Oral Daily Natalia Pizarro MD      LORazepam  1 mg Oral Q4H PRN Max 6/day Natalia Pizarro MD      Or    LORazepam  2 mg Intramuscular Q6H PRN Max 3/day Natalia Pizarro MD      losartan  50 mg Oral Daily Natalia Pizarro MD      lurasidone  60 mg Oral Daily With Santana Garrison MD      lurasidone  60 mg Oral Daily With [address], DO      melatonin  3 mg Oral HS PRN Natalia Pizarro MD      metoprolol tartrate  25 mg Oral Q12H [facility] Natalia Pizarro MD      oxybutynin  5 mg Oral Daily Natalia Pizarro MD      pantoprazole  40 mg Oral BID [facility] Natalia Pizarro MD      pilocarpine  5 mg Oral TID Natalia Pizarro MD      polyethylene glycol  17 g Oral Daily PRN Natalia Pizarro MD      propranolol  10 mg Oral Q8H PRN Natalia Pizarro MD      rOPINIRole  0 5 mg Oral HS Natalia Pizarro MD      senna-docusate sodium  1 tablet Oral Daily PRN Natalia Pizarro MD      topiramate  100 mg Oral BID Natalia Pizarro MD         Behavioral Health Medications: all current active meds have been reviewed  Changes as in plan section above  Laboratory results:  I have personally reviewed all pertinent laboratory/tests results    Recent Results (from the past 48 hour(s))   Valproic acid level, total    Collection Time: 07/22/22  8:17 PM   Result Value Ref Range    Valproic Acid, Total 44 1 (L) 50 - 120 ug/mL        Guzman Hendricks MD

## 2022-07-24 NOTE — NURSING NOTE
Patient is tv room with peers on approach was polite but suspicious She denied pain and mental health issues  She remains on 1:1 observation  She has voiced no complaints  She accepted all her evening meds and had a snack  RT was on the floor and assisted her with the C-pap  She has had no behavioral issues for this shift up to this time

## 2022-07-24 NOTE — PLAN OF CARE
Problem: Prexisting or High Potential for Compromised Skin Integrity  Goal: Skin integrity is maintained or improved  Description: INTERVENTIONS:  - Identify patients at risk for skin breakdown  - Assess and monitor skin integrity  - Assess and monitor nutrition and hydration status  - Monitor labs   - Assess for incontinence   - Turn and reposition patient  - Assist with mobility/ambulation  - Relieve pressure over bony prominences  - Avoid friction and shearing  - Provide appropriate hygiene as needed including keeping skin clean and dry  - Evaluate need for skin moisturizer/barrier cream  - Collaborate with interdisciplinary team   - Patient/family teaching  - Consider wound care consult   Outcome: Progressing     Problem: Nutrition/Hydration-ADULT  Goal: Nutrient/Hydration intake appropriate for improving, restoring or maintaining nutritional needs  Description: Monitor and assess patient's nutrition/hydration status for malnutrition  Collaborate with interdisciplinary team and initiate plan and interventions as ordered  Monitor patient's weight and dietary intake as ordered or per policy  Utilize nutrition screening tool and intervene as necessary  Determine patient's food preferences and provide high-protein, high-caloric foods as appropriate       INTERVENTIONS:  - Monitor oral intake, urinary output, labs, and treatment plans  - Assess nutrition and hydration status and recommend course of action  - Evaluate amount of meals eaten  - Assist patient with eating if necessary   - Allow adequate time for meals  - Recommend/ encourage appropriate diets, oral nutritional supplements, and vitamin/mineral supplements  - Order, calculate, and assess calorie counts as needed  - Recommend, monitor, and adjust tube feedings and TPN/PPN based on assessed needs  - Assess need for intravenous fluids  - Provide specific nutrition/hydration education as appropriate  - Include patient/family/caregiver in decisions related to nutrition  Outcome: Progressing     Problem: Alteration in Thoughts and Perception  Goal: Treatment Goal: Gain control of psychotic behaviors/thinking, reduce/eliminate presenting symptoms and demonstrate improved reality functioning upon discharge  Outcome: Progressing  Goal: Verbalize thoughts and feelings  Description: Interventions:  - Promote a nonjudgmental and trusting relationship with the patient through active listening and therapeutic communication  - Assess patient's level of functioning, behavior and potential for risk  - Engage patient in 1 on 1 interactions  - Encourage patient to express fears, feelings, frustrations, and discuss symptoms    - [place] patient to reality, help patient recognize reality-based thinking   - Administer medications as ordered and assess for potential side effects  - Provide the patient education related to the signs and symptoms of the illness and desired effects of prescribed medications  Outcome: Progressing     Problem: Ineffective Coping  Goal: Identifies ineffective coping skills  Outcome: Progressing     Problem: Risk for Self Injury/Neglect  Goal: Treatment Goal: Remain safe during length of stay, learn and adopt new coping skills, and be free of self-injurious ideation, impulses and acts at the time of discharge  Outcome: Progressing  Goal: Verbalize thoughts and feelings  Description: Interventions:  - Assess and re-assess patient's lethality and potential for self-injury  - Engage patient in 1:1 interactions, daily, for a minimum of 15 minutes  - Encourage patient to express feelings, fears, frustrations, hopes  - Establish rapport/trust with patient   Outcome: Progressing  Goal: Refrain from harming self  Description: Interventions:  - Monitor patient closely, per order  - Develop a trusting relationship  - Supervise medication ingestion, monitor effects and side effects   Outcome: Progressing     Problem: Depression  Goal: Refrain from harming self  Description: Interventions:  - Monitor patient closely, per order   - Supervise medication ingestion, monitor effects and side effects   Outcome: Progressing     Problem: SAFETY, RESTRAINT - VIOLENT/SELF-DESTRUCTIVE  Goal: Remains free of harm/injury from restraints (Restraint for Violent/Self-Destructive Behavior)  Description: INTERVENTIONS:  - Instruct patient/family regarding restraint use   - Assess and monitor physiologic and psychological status   - Provide interventions and comfort measures to meet assessed patient needs   - Ensure continuous in person monitoring is provided   - Identify and implement measures to help patient regain control  - Assess readiness for release of restraint  Outcome: Progressing  Goal: Returns to optimal restraint-free functioning  Description: INTERVENTIONS:  - Assess the patient's behavior and symptoms that indicate continued need for restraint  - Identify and implement measures to help patient regain control  - Assess readiness for release of restraint   Outcome: Progressing     Problem: INVOLUNTARY ADMIT  Goal: Will cooperate with staff recommendations and doctor's orders and will demonstrate appropriate behavior  Description: INTERVENTIONS:  - Treat underlying conditions and offer medication as ordered  - Educate regarding involuntary admission procedures and rules  - Utilize positive consistent limit setting strategies to support patient and staff safety  Outcome: Progressing     Problem: Ineffective Coping  Goal: Participates in unit activities  Description: Interventions:  - Provide therapeutic environment   - Provide required programming   - Redirect inappropriate behaviors   Outcome: Progressing     Problem: DISCHARGE PLANNING - CARE MANAGEMENT  Goal: Discharge to post-acute care or home with appropriate resources  Description: INTERVENTIONS:  - Conduct assessment to determine patient/family and health care team treatment goals, and need for post-acute services based on payer coverage, community resources, and patient preferences, and barriers to discharge  - Address psychosocial, clinical, and financial barriers to discharge as identified in assessment in conjunction with the patient/family and health care team  - Arrange appropriate level of post-acute services according to patients   needs and preference and payer coverage in collaboration with the physician and health care team  - Communicate with and update the patient/family, physician, and health care team regarding progress on the discharge plan  - Arrange appropriate transportation to post-acute venues  Outcome: Progressing

## 2022-07-24 NOTE — NURSING NOTE
Pt given Ativan 2 mg IM and benadryl 50 mg IM for severe agitation  Pt was screaming, crying, unable to maintain control

## 2022-07-24 NOTE — NURSING NOTE
Patient tearful about her mothers up coming anniversary of her death  She was also tearful because today was the anniversary of a friends death and she was refusing her meds  We discussed grief counseling and how everyone grieves differently  Patient was accepting  Patient then took her meds without issue  She remains on constant observation  No behavioral issues noted

## 2022-07-25 PROCEDURE — 99232 SBSQ HOSP IP/OBS MODERATE 35: CPT | Performed by: STUDENT IN AN ORGANIZED HEALTH CARE EDUCATION/TRAINING PROGRAM

## 2022-07-25 PROCEDURE — 94760 N-INVAS EAR/PLS OXIMETRY 1: CPT

## 2022-07-25 RX ORDER — BUPROPION HYDROCHLORIDE 150 MG/1
150 TABLET ORAL DAILY
Qty: 30 TABLET | Refills: 0 | Status: SHIPPED | OUTPATIENT
Start: 2022-07-26 | End: 2022-09-20

## 2022-07-25 RX ORDER — DIVALPROEX SODIUM 250 MG/1
750 TABLET, DELAYED RELEASE ORAL EVERY 12 HOURS SCHEDULED
Qty: 180 TABLET | Refills: 0 | Status: SHIPPED | OUTPATIENT
Start: 2022-07-25 | End: 2022-09-20

## 2022-07-25 RX ADMIN — PILOCARPINE HYDRCHLORIDE 5 MG: 5 TABLET, FILM COATED ORAL at 08:14

## 2022-07-25 RX ADMIN — KETOTIFEN FUMARATE 1 DROP: 0.35 SOLUTION/ DROPS OPHTHALMIC at 17:35

## 2022-07-25 RX ADMIN — PANTOPRAZOLE SODIUM 40 MG: 40 TABLET, DELAYED RELEASE ORAL at 06:24

## 2022-07-25 RX ADMIN — BUPROPION HYDROCHLORIDE 150 MG: 150 TABLET, FILM COATED, EXTENDED RELEASE ORAL at 08:09

## 2022-07-25 RX ADMIN — MONTELUKAST SODIUM 1 G: 4 TABLET, CHEWABLE ORAL at 21:10

## 2022-07-25 RX ADMIN — PILOCARPINE HYDRCHLORIDE 5 MG: 5 TABLET, FILM COATED ORAL at 17:35

## 2022-07-25 RX ADMIN — PANTOPRAZOLE SODIUM 40 MG: 40 TABLET, DELAYED RELEASE ORAL at 17:34

## 2022-07-25 RX ADMIN — DICLOFENAC SODIUM 2 G: 10 GEL TOPICAL at 08:14

## 2022-07-25 RX ADMIN — FUROSEMIDE 20 MG: 20 TABLET ORAL at 08:10

## 2022-07-25 RX ADMIN — METOPROLOL TARTRATE 25 MG: 25 TABLET, FILM COATED ORAL at 21:05

## 2022-07-25 RX ADMIN — PILOCARPINE HYDRCHLORIDE 5 MG: 5 TABLET, FILM COATED ORAL at 21:48

## 2022-07-25 RX ADMIN — IBUPROFEN 800 MG: 400 TABLET ORAL at 10:20

## 2022-07-25 RX ADMIN — Medication 5000 UNITS: at 08:09

## 2022-07-25 RX ADMIN — LEVOTHYROXINE SODIUM 100 MCG: 100 TABLET ORAL at 06:24

## 2022-07-25 RX ADMIN — LOSARTAN POTASSIUM 50 MG: 50 TABLET, FILM COATED ORAL at 08:11

## 2022-07-25 RX ADMIN — LORATADINE 10 MG: 10 TABLET ORAL at 08:11

## 2022-07-25 RX ADMIN — LURASIDONE HYDROCHLORIDE 60 MG: 40 TABLET, FILM COATED ORAL at 17:34

## 2022-07-25 RX ADMIN — DIVALPROEX SODIUM 750 MG: 500 TABLET, DELAYED RELEASE ORAL at 08:11

## 2022-07-25 RX ADMIN — TOPIRAMATE 100 MG: 100 TABLET, FILM COATED ORAL at 08:11

## 2022-07-25 RX ADMIN — TOPIRAMATE 100 MG: 100 TABLET, FILM COATED ORAL at 17:34

## 2022-07-25 RX ADMIN — ROPINIROLE HYDROCHLORIDE 0.5 MG: 0.5 TABLET, FILM COATED ORAL at 21:05

## 2022-07-25 RX ADMIN — AMLODIPINE BESYLATE 5 MG: 5 TABLET ORAL at 08:11

## 2022-07-25 RX ADMIN — LURASIDONE HYDROCHLORIDE 60 MG: 40 TABLET, FILM COATED ORAL at 08:10

## 2022-07-25 RX ADMIN — LORAZEPAM 2 MG: 2 INJECTION INTRAMUSCULAR; INTRAVENOUS at 17:34

## 2022-07-25 RX ADMIN — ESTRADIOL 0.5 MG: 1 TABLET ORAL at 08:09

## 2022-07-25 RX ADMIN — OXYBUTYNIN CHLORIDE 5 MG: 5 TABLET, EXTENDED RELEASE ORAL at 08:10

## 2022-07-25 RX ADMIN — MONTELUKAST SODIUM 1 G: 4 TABLET, CHEWABLE ORAL at 08:13

## 2022-07-25 RX ADMIN — METOPROLOL TARTRATE 25 MG: 25 TABLET, FILM COATED ORAL at 08:11

## 2022-07-25 RX ADMIN — DIVALPROEX SODIUM 750 MG: 500 TABLET, DELAYED RELEASE ORAL at 21:05

## 2022-07-25 NOTE — PROGRESS NOTES
[facility] Group Note     07/25/22 1116   Activity/Group Checklist   Group Life Skills  (Values and Perspectives)   Attendance Attended   Attendance Duration (min) Greater than 60   Interactions Interacted appropriately   Affect/Mood Appropriate;Calm   Goals Achieved Identified feelings; Identified triggers; Discussed coping strategies; Able to listen to others; Able to engage in interactions; Able to reflect/comment on own behavior;Able to manage/cope with feelings; Able to self-disclose; Able to recieve feedback; Able to give feedback to another

## 2022-07-25 NOTE — NURSING NOTE
Pt remains on 1:1 while awake  Pt scant in conversation and appears depressed  Denies psych symptoms to this writer by shaking head no  Med and meal compliant  Denies any unmet needs or complaints at this time  Will monitor

## 2022-07-25 NOTE — SOCIAL WORK
[address]    Name and Date of Birth:  Blair Barboza 49 y o  1970    Date of Referral: July 25, 2022    Presenting Symptoms and Stressors:      Symptoms:  depression and anxiety  Stressors:  drug use problems, family problems and anniversary of mother's death    Access to Weapons:  No    Smoking Status: denies use    Substance Use:  Cannabis: uses sporadically  Other drugs: Methamphetamines: uses sporadically    Suicidal Ideation: None at present    Homicidal Ideation: None    Depressed Mood: Yes, sadness, helplessness, negative thoughts, irritability    Pooja/Hypomania: None    Psychosis: None    Agitation: severe anxiety, restlessness    Appetite Changes: normal appetite    Sleep Disturbance: normal sleep    Diagnoses:  1  Schizoaffective Disorder, bipolar type    Current Psychiatrist or Therapist:    Psychiatrist: Dr Danitza Lantigua  Therapist: Therapist with [facility]    Do they Require Ambulatory Assistance: No    Communication Assistance: None    Legal Issues: None        Kamla Mcdaniel

## 2022-07-25 NOTE — PLAN OF CARE
Pt signed BAY for [employer]  Worker submitted referral for [employer] and intake scheduled for 7/27  Worker spoke with ACT team and confirmed discharge date  Worker was advised Pt will be seen on Wednesday afternoon by ACT team but they will reach out to Pt directly with a time after their morning meeting      Problem: DISCHARGE PLANNING - CARE MANAGEMENT  Goal: Discharge to post-acute care or home with appropriate resources  Description: INTERVENTIONS:  - Conduct assessment to determine patient/family and health care team treatment goals, and need for post-acute services based on payer coverage, community resources, and patient preferences, and barriers to discharge  - Address psychosocial, clinical, and financial barriers to discharge as identified in assessment in conjunction with the patient/family and health care team  - Arrange appropriate level of post-acute services according to patients   needs and preference and payer coverage in collaboration with the physician and health care team  - Communicate with and update the patient/family, physician, and health care team regarding progress on the discharge plan  - Arrange appropriate transportation to post-acute venues  Outcome: Progressing

## 2022-07-25 NOTE — NURSING NOTE
PT appears and observed agitated and reported anxious over discharge plan, slamming the Day room door as PT got agitated over not finding paper napkins for her hands  PRN Ativan 2mg IM given at 1734  PT sleeping at the moment

## 2022-07-25 NOTE — PLAN OF CARE
Problem: Prexisting or High Potential for Compromised Skin Integrity  Goal: Skin integrity is maintained or improved  Description: INTERVENTIONS:  - Identify patients at risk for skin breakdown  - Assess and monitor skin integrity  - Assess and monitor nutrition and hydration status  - Monitor labs   - Assess for incontinence   - Turn and reposition patient  - Assist with mobility/ambulation  - Relieve pressure over bony prominences  - Avoid friction and shearing  - Provide appropriate hygiene as needed including keeping skin clean and dry  - Evaluate need for skin moisturizer/barrier cream  - Collaborate with interdisciplinary team   - Patient/family teaching  - Consider wound care consult   Outcome: Progressing     Problem: Nutrition/Hydration-ADULT  Goal: Nutrient/Hydration intake appropriate for improving, restoring or maintaining nutritional needs  Description: Monitor and assess patient's nutrition/hydration status for malnutrition  Collaborate with interdisciplinary team and initiate plan and interventions as ordered  Monitor patient's weight and dietary intake as ordered or per policy  Utilize nutrition screening tool and intervene as necessary  Determine patient's food preferences and provide high-protein, high-caloric foods as appropriate       INTERVENTIONS:  - Monitor oral intake, urinary output, labs, and treatment plans  - Assess nutrition and hydration status and recommend course of action  - Evaluate amount of meals eaten  - Assist patient with eating if necessary   - Allow adequate time for meals  - Recommend/ encourage appropriate diets, oral nutritional supplements, and vitamin/mineral supplements  - Order, calculate, and assess calorie counts as needed  - Recommend, monitor, and adjust tube feedings and TPN/PPN based on assessed needs  - Assess need for intravenous fluids  - Provide specific nutrition/hydration education as appropriate  - Include patient/family/caregiver in decisions related to nutrition  Outcome: Progressing     Problem: Alteration in Thoughts and Perception  Goal: Treatment Goal: Gain control of psychotic behaviors/thinking, reduce/eliminate presenting symptoms and demonstrate improved reality functioning upon discharge  Outcome: Progressing  Goal: Verbalize thoughts and feelings  Description: Interventions:  - Promote a nonjudgmental and trusting relationship with the patient through active listening and therapeutic communication  - Assess patient's level of functioning, behavior and potential for risk  - Engage patient in 1 on 1 interactions  - Encourage patient to express fears, feelings, frustrations, and discuss symptoms    - [place] patient to reality, help patient recognize reality-based thinking   - Administer medications as ordered and assess for potential side effects  - Provide the patient education related to the signs and symptoms of the illness and desired effects of prescribed medications  Outcome: Progressing     Problem: Risk for Self Injury/Neglect  Goal: Treatment Goal: Remain safe during length of stay, learn and adopt new coping skills, and be free of self-injurious ideation, impulses and acts at the time of discharge  Outcome: Progressing  Goal: Verbalize thoughts and feelings  Description: Interventions:  - Assess and re-assess patient's lethality and potential for self-injury  - Engage patient in 1:1 interactions, daily, for a minimum of 15 minutes  - Encourage patient to express feelings, fears, frustrations, hopes  - Establish rapport/trust with patient   Outcome: Progressing  Goal: Refrain from harming self  Description: Interventions:  - Monitor patient closely, per order  - Develop a trusting relationship  - Supervise medication ingestion, monitor effects and side effects   Outcome: Progressing     Problem: Depression  Goal: Refrain from harming self  Description: Interventions:  - Monitor patient closely, per order   - Supervise medication ingestion, monitor effects and side effects   Outcome: Progressing     Problem: SAFETY, RESTRAINT - VIOLENT/SELF-DESTRUCTIVE  Goal: Remains free of harm/injury from restraints (Restraint for Violent/Self-Destructive Behavior)  Description: INTERVENTIONS:  - Instruct patient/family regarding restraint use   - Assess and monitor physiologic and psychological status   - Provide interventions and comfort measures to meet assessed patient needs   - Ensure continuous in person monitoring is provided   - Identify and implement measures to help patient regain control  - Assess readiness for release of restraint  Outcome: Progressing  Goal: Returns to optimal restraint-free functioning  Description: INTERVENTIONS:  - Assess the patient's behavior and symptoms that indicate continued need for restraint  - Identify and implement measures to help patient regain control  - Assess readiness for release of restraint   Outcome: Progressing     Problem: INVOLUNTARY ADMIT  Goal: Will cooperate with staff recommendations and doctor's orders and will demonstrate appropriate behavior  Description: INTERVENTIONS:  - Treat underlying conditions and offer medication as ordered  - Educate regarding involuntary admission procedures and rules  - Utilize positive consistent limit setting strategies to support patient and staff safety  Outcome: Progressing     Problem: Ineffective Coping  Goal: Participates in unit activities  Description: Interventions:  - Provide therapeutic environment   - Provide required programming   - Redirect inappropriate behaviors   Outcome: Progressing     Problem: DISCHARGE PLANNING - CARE MANAGEMENT  Goal: Discharge to post-acute care or home with appropriate resources  Description: INTERVENTIONS:  - Conduct assessment to determine patient/family and health care team treatment goals, and need for post-acute services based on payer coverage, community resources, and patient preferences, and barriers to discharge  - Address psychosocial, clinical, and financial barriers to discharge as identified in assessment in conjunction with the patient/family and health care team  - Arrange appropriate level of post-acute services according to patients   needs and preference and payer coverage in collaboration with the physician and health care team  - Communicate with and update the patient/family, physician, and health care team regarding progress on the discharge plan  - Arrange appropriate transportation to post-acute venues  Outcome: Progressing

## 2022-07-25 NOTE — PLAN OF CARE
Pt is scheduled for intake on 7/27 with [facility]     Problem: DISCHARGE PLANNING - CARE MANAGEMENT  Goal: Discharge to post-acute care or home with appropriate resources  Description: INTERVENTIONS:  - Conduct assessment to determine patient/family and health care team treatment goals, and need for post-acute services based on payer coverage, community resources, and patient preferences, and barriers to discharge  - Address psychosocial, clinical, and financial barriers to discharge as identified in assessment in conjunction with the patient/family and health care team  - Arrange appropriate level of post-acute services according to patients   needs and preference and payer coverage in collaboration with the physician and health care team  - Communicate with and update the patient/family, physician, and health care team regarding progress on the discharge plan  - Arrange appropriate transportation to post-acute venues  7/25/2022 1426 by Malik Barnett  Outcome: Progressing  7/25/2022 1357 by Malik Barnett  Outcome: Progressing

## 2022-07-25 NOTE — DISCHARGE INSTR - APPOINTMENTS
Juan Simon or Rosemarie, our [place], will be calling you after your discharge, on the phone number that you provided  They will be available as an additional support, if needed  If you wish to speak with one of them, you may contact Gricelda Guerrero at [phone] or Bismark Flores at [phone]

## 2022-07-25 NOTE — DISCHARGE INSTR - OTHER ORDERS
CRISIS INFORMATION  If you are experiencing a mental health emergency, you may call the [address] [facility] 24 hours a day, 7 days per week at [phone]  In [employer], call [phone]  When you need someone to listen, the Mariaelena Current is available for 16 hours a day, 7 days a week, from the time of 7-10am and 2pm-2am   It is not available from the hours of 2am-6am and 10am-2pm  A representative can be reached at [phone]  HOW TO GET SUBSTANCE ABUSE HELP:  If you or someone you know has a drug or alcohol problem, there is help:  [address]: [address]: [phone]  An assessment is the first step  In addition to those listed there are other programs available in the area but assessment is best to determine an appropriate level of care  If you [address], an assessment can be scheduled at one of these providers:  [place] [facility]  [address] , [place], 98 [facility]  [address]  Adam Parkinson [address], [address] [place]  [address]  D&A Intake Unit  [address] [facility] [address] , 1st Floor, [place], [address]  [phone]  [address], [address] [facility],6Th Floor, [place], [address] [facility] [address] [facility]  [address] , [place], [address]  [address]  [facility] [address]  [facility]  [phone]   NET ([facility])  [address]  [address] [facility], [place], [address]  [address] [facility]  2 [facility], 105 [facility]   [address] , [place], [address] [facility]  [facility] [place] [facility]  [address] [facility] , [place], 98 [facility]  [address] , [address], [place], 10 [facility]

## 2022-07-25 NOTE — NURSING NOTE
Patient continues on 1:1 continual observation  Visible in the dayroom watching tv in the dayroom but guarded to self  Patient's calm, cooperative, chilango-eyed about mom's death anniversary coming up  Nursing encouragement and support given  Denies all  Affect- flat  Will nita

## 2022-07-25 NOTE — PROGRESS NOTES
Progress Note - Behavioral Health   Charly Samaniego 49 y o  female MRN: [id number]  Unit/Bed#: U 344-01 Encounter: [id number]    Assessment/Plan   Principal Problem:    Schizoaffective disorder, bipolar type ([facility] )  Active Problems:    Benign essential hypertension    Edema    Hypothyroidism    MAG (obstructive sleep apnea)    Overactive bladder    Marijuana abuse    COPD (chronic obstructive pulmonary disease) ([facility])    Class 3 severe obesity due to excess calories with serious comorbidity and body mass index (BMI) of 40 0 to 44 9 in adult [facility])    Medical clearance for psychiatric admission    GERD (gastroesophageal reflux disease)    Diarrhea    Borderline personality disorder ([place] [address] )      Recommended Treatment:     1  Continue with medications:  a  Wellbutrin  mg daily for depressive symptoms  b  Depakote 750 mg b i d  For mood stabilization  c  Latuda 60 mg q a m , 60 mg HS for mood stabilization  2  Continue with group therapy, milieu therapy and occupational therapy  3  Continue frequent safety checks and vitals per unit protocol  4  Continue with [facility] medical management as indicated  5  Continue coordinating with case management regarding disposition - pending discharge tomorrow, 07/26/2022, follow-up with PHP on Wednesday, 07/27/2022  Case discussed with treatment team   Risks, benefits and possible side effects of Medications: Risks, benefits, and possible side effects of medications have been explained to the patient, who verbalizes understanding    Legal Status: 201  Diet:  Normal diet    ------------------------------------------------------------    Subjective: Per nursing report, Pito Chavez has been guarded, skin, visible, cooperative on the unit and compliant with medications  Overnight, the patient was given Ativan 2 mg IM and Benadryl 50 mg IM for severe anxiety      On evaluation, Pito Chavez is tearful at times, cooperative dressed in casual clothing wearing glasses and carrying a purple stress ball  She states her mood is "all right " She reports sleeping 8 or 9 hours last night and her energy level today is okay  Her appetite has been adequate, she endorses eating breakfast with no difficulty  She denies any adverse effects from medications  Her goals for today are to continue attending groups, call her father, remain in behavioral control, prepare for discharge tomorrow  Today, Otto Dennis is jarod for safety on the unit  She denies suicidal ideation, homicidal ideation, auditory hallucinations, and visual hallucinations  She denies paranoid thoughts or ideation  Of note, the patient remains perseverative around discharge, though the patient is agreeable to a partial program to help her transition from the inpatient unit to outpatient  The patient has her 1st appointment scheduled with partial on Wednesday, 07/27/2022  The patient states she feels she is safe for discharge and is looking forward to go home and spending time with her cat, Nehemiah as well as continuing work once she is able  PRNs overnight:  Ativan 2 mg, Benadryl 50 mg IM  VS: Reviewed, within normal limits    Progress Toward Goals: Slow improvement    Psychiatric Review of Systems:  Behavior over the last 24 hours: improved  Sleep: improving  Appetite: adequate  Medication side effects: none verbalized  Medical ROS: Complete review of systems is negative except as noted above      Vital signs in last 24 hours:  Temp:  [97 7 °F (36 5 °C)-97 8 °F (36 6 °C)] 97 8 °F (36 6 °C)  HR:  [67-78] 67  Resp:  [18] 18  BP: (107-113)/(53-59) 113/59    Mental Status Exam:  Appearance:  alert, good eye contact, appears stated age, casually dressed, appropriate grooming and hygiene, obese and tattooed   Behavior:  calm, cooperative, guarded and sitting comfortably   Motor: no abnormal movements and normal gait and balance   Speech:  spontaneous, clear, normal rate, normal volume and coherent   Mood:  "All right"   Affect:  mood-congruent, anxious and tearful at times   Thought Process:  organized, goal directed, perseverative   Thought Content: no verbalized delusions or overt paranoia, ruminating thoughts   Perceptual disturbances: no reported hallucinations and does not appear to be responding to internal stimuli at this time   Risk Potential: No active or passive suicidal or homicidal ideation was verbalized during interview   Cognition: oriented to self and situation, memory grossly intact, appears to be of average intelligence and cognition not formally tested   Insight:  Improving   Judgment: Improving     Current Medications:  Current Facility-Administered Medications   Medication Dose Route Frequency Provider Last Rate    albuterol  2 puff Inhalation Q6H PRN Negrito Guerrero MD      aluminum-magnesium hydroxide-simethicone  30 mL Oral Q4H PRN Negrito Guerrero MD      amLODIPine  5 mg Oral Daily Negrito Guerrero MD      artificial tear  1 application Both Eyes X3Y PRN Negrito Guerrero MD      LORazepam  2 mg Intramuscular Q6H PRN Max 4/day Oz Manriquez MD      And    haloperidol lactate  2 5 mg Intramuscular Q6H PRN Max 4/day Oz Manriquez MD      And    benztropine  0 5 mg Intramuscular Q6H PRN Max 4/day Oz Manriquez MD      haloperidol lactate  5 mg Intramuscular Q4H PRN Max 4/day Negrito Guerrero MD      And    LORazepam  2 mg Intramuscular Q4H PRN Max 4/day Negrito Guerrero MD      And    benztropine  1 mg Intramuscular Q4H PRN Max 4/day Negrito Guerrero MD      benztropine  1 mg Intramuscular Q4H PRN Max 6/day Negrito Guerrero MD      benztropine  1 mg Oral Q4H PRN Max 6/day Negrito Guerrero MD      buPROPion  150 mg Oral Daily Oz Manriquez MD      chlorproMAZINE  100 mg Intramuscular Q4H PRN Max 3/day Susana DO Britney      And    diphenhydrAMINE  50 mg Intramuscular Q4H PRN Max 3/day Susana Britney,       cholecalciferol  5,000 Units Oral Daily Negrito Guerrero MD      colestipol  1 g Oral BID Ramesh Rodriguez PA-C      divalproex sodium  750 mg Oral Q12H [address], MD      estradiol  0 5 mg Oral Daily Radha Okeefe MD      fluticasone  2 puff Inhalation BID Ramesh Rodriguez PA-C      furosemide  20 mg Oral Daily Radha Okeefe MD      haloperidol  2 mg Oral Q4H PRN Max 6/day Radha Okeefe MD      haloperidol  5 mg Oral Q6H PRN Max 4/day Radha Okeefe MD      haloperidol  5 mg Oral Q4H PRN Max 4/day Radha Okeefe MD      hydrOXYzine HCL  25 mg Oral Q6H PRN Max 4/day Radha Okeefe MD      hydrOXYzine HCL  50 mg Oral Q4H PRN Max 4/day Radha Okeefe MD      Or    LORazepam  1 mg Intramuscular Q4H PRN Radha Okeefe MD      ibuprofen  400 mg Oral Q4H PRN Radha Okeefe MD      ibuprofen  600 mg Oral Q6H PRN Radha Okeefe MD      ibuprofen  800 mg Oral Q8H PRN Radha Okeefe MD      ketotifen  1 drop Both Eyes BID Radha Okeefe MD      levothyroxine  100 mcg Oral Early Morning Radha Okeefe MD      loperamide  2 mg Oral TID PRN Radha Okeefe MD      loratadine  10 mg Oral Daily Radha Okeefe MD      LORazepam  1 mg Oral Q4H PRN Max 6/day Radha Okeefe MD      Or    LORazepam  2 mg Intramuscular Q6H PRN Max 3/day Radha Okeefe MD      losartan  50 mg Oral Daily Radha Okeefe MD      lurasidone  60 mg Oral Daily With Sharita Roach MD      lurasidone  60 mg Oral Daily With [address], DO      melatonin  3 mg Oral HS PRN Radha Okeefe MD      metoprolol tartrate  25 mg Oral Q12H [facility] Radha Okeefe MD      oxybutynin  5 mg Oral Daily Radha Okeefe MD      pantoprazole  40 mg Oral BID [facility] Radha Okeefe MD      pilocarpine  5 mg Oral TID Radha Okeefe MD      polyethylene glycol  17 g Oral Daily PRN Radha Okeefe MD  propranolol  10 mg Oral Q8H PRN Vinicio Barnes MD      rOPINIRole  0 5 mg Oral HS Vinicio Barnes MD      senna-docusate sodium  1 tablet Oral Daily PRN Vinicio Barnes MD      topiramate  100 mg Oral BID Vinicio Barnes MD         Behavioral Health Medications: all current active meds have been reviewed  Changes as in plan section above  Laboratory results:  I have personally reviewed all pertinent laboratory/tests results  No results found for this or any previous visit (from the past 48 hour(s))  This note has been constructed using a voice recognition system  There may be translation, syntax, or grammatical errors  If you have any questions, please contact the dictating author      Valery Bolivar DO  Psychiatry Residency, PGY-1

## 2022-07-26 VITALS
HEIGHT: 66 IN | WEIGHT: 278.6 LBS | OXYGEN SATURATION: 99 % | SYSTOLIC BLOOD PRESSURE: 110 MMHG | RESPIRATION RATE: 16 BRPM | HEART RATE: 66 BPM | BODY MASS INDEX: 44.77 KG/M2 | DIASTOLIC BLOOD PRESSURE: 61 MMHG | TEMPERATURE: 97.5 F

## 2022-07-26 PROCEDURE — 99238 HOSP IP/OBS DSCHRG MGMT 30/<: CPT | Performed by: STUDENT IN AN ORGANIZED HEALTH CARE EDUCATION/TRAINING PROGRAM

## 2022-07-26 RX ORDER — HYDROXYZINE HYDROCHLORIDE 25 MG/1
25 TABLET, FILM COATED ORAL EVERY 6 HOURS PRN
Qty: 60 TABLET | Refills: 0 | Status: SHIPPED | OUTPATIENT
Start: 2022-07-26 | End: 2022-09-15

## 2022-07-26 RX ADMIN — DIVALPROEX SODIUM 750 MG: 500 TABLET, DELAYED RELEASE ORAL at 08:40

## 2022-07-26 RX ADMIN — LOSARTAN POTASSIUM 50 MG: 50 TABLET, FILM COATED ORAL at 08:40

## 2022-07-26 RX ADMIN — LURASIDONE HYDROCHLORIDE 60 MG: 40 TABLET, FILM COATED ORAL at 08:39

## 2022-07-26 RX ADMIN — OXYBUTYNIN CHLORIDE 5 MG: 5 TABLET, EXTENDED RELEASE ORAL at 08:40

## 2022-07-26 RX ADMIN — BUPROPION HYDROCHLORIDE 150 MG: 150 TABLET, FILM COATED, EXTENDED RELEASE ORAL at 08:40

## 2022-07-26 RX ADMIN — LEVOTHYROXINE SODIUM 100 MCG: 100 TABLET ORAL at 06:33

## 2022-07-26 RX ADMIN — PILOCARPINE HYDRCHLORIDE 5 MG: 5 TABLET, FILM COATED ORAL at 08:46

## 2022-07-26 RX ADMIN — ESTRADIOL 0.5 MG: 1 TABLET ORAL at 08:45

## 2022-07-26 RX ADMIN — AMLODIPINE BESYLATE 5 MG: 5 TABLET ORAL at 08:40

## 2022-07-26 RX ADMIN — TOPIRAMATE 100 MG: 100 TABLET, FILM COATED ORAL at 08:40

## 2022-07-26 RX ADMIN — FUROSEMIDE 20 MG: 20 TABLET ORAL at 08:39

## 2022-07-26 RX ADMIN — MONTELUKAST SODIUM 1 G: 4 TABLET, CHEWABLE ORAL at 08:45

## 2022-07-26 RX ADMIN — IBUPROFEN 800 MG: 400 TABLET ORAL at 08:53

## 2022-07-26 RX ADMIN — PANTOPRAZOLE SODIUM 40 MG: 40 TABLET, DELAYED RELEASE ORAL at 06:33

## 2022-07-26 RX ADMIN — LORATADINE 10 MG: 10 TABLET ORAL at 08:40

## 2022-07-26 RX ADMIN — LORAZEPAM 1 MG: 1 TABLET ORAL at 10:00

## 2022-07-26 RX ADMIN — Medication 5000 UNITS: at 08:40

## 2022-07-26 RX ADMIN — METOPROLOL TARTRATE 25 MG: 25 TABLET, FILM COATED ORAL at 08:40

## 2022-07-26 NOTE — NURSING NOTE
Patient awoke and completed all morning routines  Patient is looking forward to their upcoming discharge  Patient was medication and meal complaint this AM, and denies all psych symptoms  All discharge information was discussed with the patient, and patient stated that they will follow through with all discharge plans  Discharge medication was discussed with the patient, and patient stated that they would continue to take their medications as prescribed  All questions were answered at the time of discharge, and contact information was provided to the patient for any questions that may arise after discharge  Patient was discharged to home at 1130, and all patient belongings were returned to the patient at the time of discharge

## 2022-07-26 NOTE — BH TRANSITION RECORD
Contact Information: If you have any questions, concerns, pended studies, tests and/or procedures, or emergencies regarding your inpatient behavioral health visit  Please contact [facility] behavioral health unit 3B [phone]  and ask to speak to a , nurse or physician  A contact is available 24 hours/ 7 days a week at this number  Summary of Procedures Performed During your Stay:  Below is a list of major procedures performed during your hospital stay and a summary of results:  - Cardiac Procedures/Studies: ECG 12 Lead  Study Result    Narrative & Impression   Sinus bradycardia  Otherwise normal ECG  When compared with ECG of 14-JUL-2022 16:31,  Premature atrial complexes are no longer Present         Pending Studies (From admission, onward)     Start     Ordered    07/26/22 0000  Valproic acid level, total         07/26/22 1022    07/26/22 0000  Hepatic function panel        Comments: This is a patient instruction: This test is non-fasting  Please drink two glasses of water morning of bloodwork        07/26/22 1022              Please follow up on the above pending studies with your PCP and/or referring provider

## 2022-07-26 NOTE — PROGRESS NOTES
07/26/22 0936   Team Meeting   Meeting Type Daily Rounds   Team Members Present   Team Members Present Physician;Nurse;   Physician Team Member Dr Wellington Colunga Team Member [address] Management Team Member Sunitha   Patient/Family Present   Patient Present No   Patient's Family Present No   Pt is medication compliant, denying all symptoms  Discharge today

## 2022-07-26 NOTE — PLAN OF CARE
Pt to D/C today  Pt denies SI/HI/AVH  Pt oriented x3  Pt to d/c to her apartment and her father will  upon discharge  Pt to follow up with [facility] on 7/27  Pt to follow up with WRT referral  Scripts sent to preferred pharmacy       Discharge Address: [address], [address] [facility], [address] [place] [address] Road  Phone: [phone]

## 2022-07-26 NOTE — PROGRESS NOTES
Met with Bouchra Barrios to complete her relapse prevention  She is looking forward to discharge  She identified her symptoms as being psychotic, depressed and suicidal  Her coping skills are playing with her cat, sharing with friends and working  Her support system is her Dad, [employer] team, her cat and ganesh plus other friends  Her warning signs are a decrease in self esteem, negative thinking and change in mood more irritable

## 2022-07-26 NOTE — PLAN OF CARE
Problem: Prexisting or High Potential for Compromised Skin Integrity  Goal: Skin integrity is maintained or improved  Description: INTERVENTIONS:  - Identify patients at risk for skin breakdown  - Assess and monitor skin integrity  - Assess and monitor nutrition and hydration status  - Monitor labs   - Assess for incontinence   - Turn and reposition patient  - Assist with mobility/ambulation  - Relieve pressure over bony prominences  - Avoid friction and shearing  - Provide appropriate hygiene as needed including keeping skin clean and dry  - Evaluate need for skin moisturizer/barrier cream  - Collaborate with interdisciplinary team   - Patient/family teaching  - Consider wound care consult   Outcome: Adequate for Discharge     Problem: Nutrition/Hydration-ADULT  Goal: Nutrient/Hydration intake appropriate for improving, restoring or maintaining nutritional needs  Description: Monitor and assess patient's nutrition/hydration status for malnutrition  Collaborate with interdisciplinary team and initiate plan and interventions as ordered  Monitor patient's weight and dietary intake as ordered or per policy  Utilize nutrition screening tool and intervene as necessary  Determine patient's food preferences and provide high-protein, high-caloric foods as appropriate       INTERVENTIONS:  - Monitor oral intake, urinary output, labs, and treatment plans  - Assess nutrition and hydration status and recommend course of action  - Evaluate amount of meals eaten  - Assist patient with eating if necessary   - Allow adequate time for meals  - Recommend/ encourage appropriate diets, oral nutritional supplements, and vitamin/mineral supplements  - Order, calculate, and assess calorie counts as needed  - Recommend, monitor, and adjust tube feedings and TPN/PPN based on assessed needs  - Assess need for intravenous fluids  - Provide specific nutrition/hydration education as appropriate  - Include patient/family/caregiver in decisions related to nutrition  Outcome: Adequate for Discharge     Problem: Alteration in Thoughts and Perception  Goal: Treatment Goal: Gain control of psychotic behaviors/thinking, reduce/eliminate presenting symptoms and demonstrate improved reality functioning upon discharge  Outcome: Adequate for Discharge  Goal: Verbalize thoughts and feelings  Description: Interventions:  - Promote a nonjudgmental and trusting relationship with the patient through active listening and therapeutic communication  - Assess patient's level of functioning, behavior and potential for risk  - Engage patient in 1 on 1 interactions  - Encourage patient to express fears, feelings, frustrations, and discuss symptoms    - [place] patient to reality, help patient recognize reality-based thinking   - Administer medications as ordered and assess for potential side effects  - Provide the patient education related to the signs and symptoms of the illness and desired effects of prescribed medications  Outcome: Adequate for Discharge     Problem: Ineffective Coping  Goal: Identifies ineffective coping skills  Outcome: Adequate for Discharge     Problem: Risk for Self Injury/Neglect  Goal: Treatment Goal: Remain safe during length of stay, learn and adopt new coping skills, and be free of self-injurious ideation, impulses and acts at the time of discharge  Outcome: Adequate for Discharge  Goal: Verbalize thoughts and feelings  Description: Interventions:  - Assess and re-assess patient's lethality and potential for self-injury  - Engage patient in 1:1 interactions, daily, for a minimum of 15 minutes  - Encourage patient to express feelings, fears, frustrations, hopes  - Establish rapport/trust with patient   Outcome: Adequate for Discharge  Goal: Refrain from harming self  Description: Interventions:  - Monitor patient closely, per order  - Develop a trusting relationship  - Supervise medication ingestion, monitor effects and side effects   Outcome: Adequate for Discharge     Problem: Depression  Goal: Refrain from harming self  Description: Interventions:  - Monitor patient closely, per order   - Supervise medication ingestion, monitor effects and side effects   Outcome: Adequate for Discharge     Problem: SAFETY, RESTRAINT - VIOLENT/SELF-DESTRUCTIVE  Goal: Remains free of harm/injury from restraints (Restraint for Violent/Self-Destructive Behavior)  Description: INTERVENTIONS:  - Instruct patient/family regarding restraint use   - Assess and monitor physiologic and psychological status   - Provide interventions and comfort measures to meet assessed patient needs   - Ensure continuous in person monitoring is provided   - Identify and implement measures to help patient regain control  - Assess readiness for release of restraint  Outcome: Adequate for Discharge  Goal: Returns to optimal restraint-free functioning  Description: INTERVENTIONS:  - Assess the patient's behavior and symptoms that indicate continued need for restraint  - Identify and implement measures to help patient regain control  - Assess readiness for release of restraint   Outcome: Adequate for Discharge     Problem: INVOLUNTARY ADMIT  Goal: Will cooperate with staff recommendations and doctor's orders and will demonstrate appropriate behavior  Description: INTERVENTIONS:  - Treat underlying conditions and offer medication as ordered  - Educate regarding involuntary admission procedures and rules  - Utilize positive consistent limit setting strategies to support patient and staff safety  Outcome: Adequate for Discharge     Problem: Ineffective Coping  Goal: Participates in unit activities  Description: Interventions:  - Provide therapeutic environment   - Provide required programming   - Redirect inappropriate behaviors   Outcome: Adequate for Discharge

## 2022-07-26 NOTE — DISCHARGE SUMMARY
Discharge Summary - [address]  Rachel Parra [age] y o  female MRN: [id number]  Unit/Bed#: U 344-01 Encounter: [id number]     Admission Date: 7/15/22  Admission Orders (From admission, onward)     Ordered        07/15/22 2024  ED TO DIFFERENT CAMPUS IP [facility] UNIT or INPATIENT MEDICAL UNIT to [place] [id number] (using Discharge Readmit Navigator) - Admit Patient to [address] [place] [address]  Once                          Discharge Date: 22    Attending Psychiatrist: Fiorella Vazquez MD    Reason for Admission:   Ginger Baxter is a 49 y o  female, admitted to the inpatient behavioral health unit at [address] [place] [address] , as a voluntarily 201 commitment, subsequent to an intentional overdose on Depakote  [place] Felix reported becoming upset with her ACT team after finding out that her placing another member of the team, she states she had been stockpiling her medications for a long time  The patient states she has had many recent stressors including a recent inpatient psychiatric stay as well as the death of her mother last August that have caused worsening of depressive symptoms  When she found out about her replacement for her ACT team she decided to attempt overdose on Depakote  Please refer to the initial H&P below for full details  See below H&P from Mireille Malik MD on 2022 :    "Ginger Baxter is a 49 y o    female, domiciled alone with her cat  w/ PMH of multiple medical conditions including obesity, HTN, HLD, hypothyroidism, MAG (using CPAP), OAB, GERD, DDD,  Chronic LBP, chronic tension headache, and PPH of polysubstance use disorder (cannabis, methamphetamine), Schizoaffective disorder, anxiety, Borderline Personality Disorder, multiple prior psychiatric admissions, h/o prior SA, h/o self-injurious behavior, being followed by  ACT ([phone]) and psychiatrist Dr Khanh aCtalan, on Atarax 25 mg PRN, Wellbutron  mg daily, Effexor 75 mg daily, Latuda 20/60mg, Topamax 150 mg BID, Depakote 1000 mg nightly prior to admission who presented to [facility] ED due to SA via Depakote OD (reportedly 14 pills) after taking street meth and medical marijuana  Reportedly, as per ED note, the OD was triggered by missing her mother who passed away earlier this year and being estranged from her father as well as ACT  who was reportedly leaving  The patient was admitted to the inpatient psychiatric unit at 3B (signed 201) after medical clearance       The pt was visited on the unit; chart reviewed  Presented calm, uncooperative, dressed in hospital attire, disheveled, lying on the bed, poor eye contact, depressed and dysphoric mood, constricted intense affect, tearful, talking in low tone and decreased amount, linear concrete TP, limited insight and judgement  She initially was not cooperative answering any questions, and later c/o back pain due ot lying flat on the regular bed, asking for hospital bed while using CPAP  She reported that she is "tired of living" and added "nobldy can help me"  She reported hopelessness, helplessness and more depressed for past "couple of week" stated: "everything went downhill"  She reported "so-so" compliance with her medication over past couple of weeks  Denied A/VH  She became tearful, ruminating "there is no way to help me", and then was not cooperative with the rest of psychiatric evaluation  She was tearful and terminated the interview while did not answer the safety questions and did not consent for safety  Hence, the patient remains on 1:1 for safety concerns and suicidality "      Hospital Course: The patient was admitted to the inpatient psychiatric unit and started on behavioral health checks every 15 minutes per unit protocol  Upon admission, the patient was evaluated by the medical service for medical clearance and further management of medical issues as needed   At the start of hospitalization, she was exhibiting symptoms of worsening depressive symptoms including decreased sleep, decreased appetite, decreased energy, anhedonia, suicidal ideation with intent and plan, guilt, hopelessness, increased symptoms of anxiety, and significant grief regarding the death of her mother last August  During hospitalization, Cj Younger was encourage to participate in group therapy, milieu therapy, and occupational therapy  Patient was admitted on a 201 voluntary basis for treatment  Upon evaluation, she was started on Wellbutrin  mg, Latuda 20 mg in the a m , 60 mg in the p m , Topamax 100 mg b i d  to address symptoms of depressive symptoms, mood instability, migraines and increased appetite  Possible side effects were discussed with the patient prior to initiation, and she verbalized understanding  Medications were appropriately titrated to Wellbutrin  mg daily for depressive symptoms, Depakote 750 mg b i d  For mood stabilization, Latuda 60 mg q a m , 60 mg q p m  For mood stabilization, Topamax 100 mg b i d  For migraines and appetite control  The patient adhered to her medication regimen and denied any acute adverse effects not otherwise mentioned  Her symptoms began to improve, and her affect brightened throughout  the course of psychiatric management  She reported improvements in sleep, appetite, energy, impulse control, behavioral control, and mood stability  She was seen in [facility] interacting appropriately with peers and actively participating in group therapy  Herman Sinha did not demonstrate dangerous behavior to self or peers during her inpatient stay  As she began to improve, the treatment team agreed she was safe for discharge with plan to continue outpatient treatment  At the time of discharge, Herman Sinha reports feeling excited to go home and a little anxious, stating she is very happy to see her cat  She denied suicidal and homicidal ideations at the time of discharge, as well as auditory and visual hallucinations    Her goals are to remain compliant with medications and follow-up with outpatient as well as PHP, spent time at home with her cat and father, follow-up with her ortho appointment on Wednesday and return to work the following Monday  The patient states she owns no firearms and has no access to such  The patient states if she experiences any worsening of symptoms or experiences any SI in the future she will reach out to both family and her ACT team   Applicable follow up and safety plan was reviewed with the patient prior to discharge  Risk of Harm to Self:   The following ratings are based on assessment at the time of discharge and review of the hospital stay progress  Demographic risk factors include: , age: over 48 or older  Historical Risk Factors include: chronic psychiatric problems, chronic depression, chronic anxiety symptoms, chronic mood disorder, history of psychosis, history of suicidal behaviors, substance use, history of substance use, history of abuse  Current Specific Risk Factors include: recent inpatient psychiatric admission - being discharged today, recent suicide attempt, diagnosis of mood disorder, chronic depressive symptoms, chronic anxiety symptoms, poor impulse control, substance use, recent losses (mother passed away last August)  Protective Factors: no current suicidal ideation, stable mood, no current psychotic symptoms, improved mood, improved anxiety symptoms, improved psychotic symptoms, improved impulse control, ability to adapt to change, ability to make plans for the future, outpatient psychiatric follow up established, family support established, compliant with medications, stable job, stable housing, effective coping skills, having a desire to be alive, having pets, restricted access to lethal means, supportive family, supportive friends  Weapons/Firearms: none   The following steps have been taken to ensure weapons are properly secured: not applicable  Based on today's Bethanie Hollins presents the following risk of harm to self: low    Risk of Harm to Others: The following ratings are based on assessment at the time of discharge and review of the hospital stay progress  Demographic Risk Factors include: none  Historical Risk Factors include: history of aggressive behavior, victim of childhood bullying, drug abuse  Current Specific Risk Factors include: recent difficulty with impulse control, recent episode of mood instability, recent substance use, recent episodes of agitation, behavior suggesting impulsivity, multiple stressors, social difficulties, sees self as a victim   Protective Factors: no current homicidal ideation, improved impulse control, stable mood, no current psychotic symptoms, willing to continue psychiatric treatment, willing to remain free from substance use, outpatient follow up established, stable living environment, good support system  Weapons/Firearms: none  The following steps have been taken to ensure weapons are properly secured: not applicable   Based on today's assessment, Tarsha Nicklinette presents the following risk of harm to others: low     Discharge Medications:    Psychiatric medications include:     Wellbutrin  mg daily for depressive symptoms   Latuda 60 mg q a m , 60 mg q p m  For mood stabilization   Depakote 750 mg b i d  For mood stabilization   Topamax 100 mg b i d  For migraines and appetite control  Other home medications for medical conditions were continued throughout hospitalization, if applicable  See AVS for more details  Follow ups: The patient is scheduled for follow up at:     [facility] team    Behavioral Health Medications: all current active meds have been reviewed and continue current psychiatric medications  Labs/Imaging:   I have personally reviewed all pertinent laboratory/tests results    Most Recent Labs:   Lab Results   Component Value Date    WBC 7 82 07/17/2022    RBC 5 04 07/17/2022    HGB 15 4 07/17/2022    HCT 46 2 (H) 07/17/2022     07/17/2022    RDW 12 8 07/17/2022    NEUTROABS 4 74 07/17/2022    SODIUM 140 07/17/2022    K 3 5 (L) 07/17/2022     07/17/2022    CO2 25 07/17/2022    BUN 11 07/17/2022    CREATININE 0 62 07/17/2022    GLUC 104 (H) 07/17/2022    GLUF 104 (H) 07/17/2022    CALCIUM 8 6 07/17/2022    AST 29 07/17/2022    ALT 29 07/17/2022    ALKPHOS 69 07/17/2022    TP 6 9 07/17/2022    ALB 3 7 07/17/2022    TBILI 0 42 07/17/2022    CHOLESTEROL 227 (H) 07/17/2022    HDL 33 (L) 07/17/2022    TRIG 169 (H) 07/17/2022    LDLCALC 160 (H) 07/17/2022    NONHDLC 194 07/17/2022    VALPROICTOT 44 1 (L) 07/22/2022    AMMONIA 61 07/13/2022    UOD7KOREYXHK 2 070 07/17/2022    FREET4 0 90 05/25/2022    T3FREE 2 27 (L) 10/16/2019    PREGUR negative 07/13/2022    PREGSERUM Negative 01/06/2022    RPR Non-Reactive 07/17/2022    HGBA1C 5 2 02/22/2022     02/22/2022       Mental Status at time of Discharge:   Appearance:  age appropriate, dressed appropriately, looks stated age, overweight, tattooed  sitting comfortably in chair, adequate hygiene and grooming, cooperative with interview, good eye contact   Behavior:  cooperative and makes good eye contact   Speech:  normal rate, normal volume, normal pitch, fluent, clear and coherent   Mood:  Excited to go home, a little anxious   Affect:  mood-congruent   Language Within normal limits   Thought Process:  organized, logical, goal directed, normal rate of thoughts   Thought Content:  No verbalized delusions   Perceptual Disturbances: Denies auditory or visual hallucinations and Does not appear to be responding to internal stimuli   Risk Potential: Denies suicidal or homicidal ideation, intent, or plan   Sensorium:  person, place, time and current situation   Cognition:  Grossly intact   Consciousness:  alert and awake   Attention: attention span and concentration were age appropriate   Insight:  fair   Judgment: fair   Intellect appears to be of average intelligence   Gait/Station: normal gait/station   Motor Activity: no abnormal movements     Discharge Diagnosis:   Patient Active Problem List   Diagnosis    Yan's esophagus determined by biopsy    Benign essential hypertension    Schizoaffective disorder, bipolar type ([place] [address] )    Chronic low back pain    DDD (degenerative disc disease), lumbar    Edema    Family history of renal cancer    Hypothyroidism    Microhematuria    MAG (obstructive sleep apnea)    Spondylosis of lumbosacral joint without myelopathy    Overactive bladder    Primary osteoarthritis of both knees    Major depressive disorder    Sjogren's syndrome ([place] [address] )    Marijuana abuse    COPD (chronic obstructive pulmonary disease) ([facility])    Mixed hyperlipidemia    Class 3 severe obesity due to excess calories with serious comorbidity and body mass index (BMI) of 40 0 to 44 9 in adult [facility])    Memory difficulties    History of COVID-19    Morbid obesity with BMI of 40 0-44 9, adult ([place] [address] )    Medical clearance for psychiatric admission    Hemorrhage of rectum and anus    Medial epicondylitis of left elbow    Restrictive lung disease    Chronic tension-type headache, intractable    Potential for cognitive impairment    Mood disorder ([facility])    Substance abuse ([facility])    Cognitive impairment    Contusion of elbow    GERD (gastroesophageal reflux disease)    Diarrhea    Ecchymosis    Anxiety    Borderline personality disorder ([place] [address] )    Platelets decreased ([place] [address] )    Knee pain, right    Suicidal deliberate poisoning ([place] [address] )       Discharge Medications:  See list above, as well as the after visit summary containing reconciled discharge medications provided to patient and family  Discharge instructions/Information to patient and family:   See after visit summary for information provided to patient and family        Provisions for Follow-Up Care:  See after visit summary for information related to follow-up care and any pertinent home health orders  This note has been constructed using a voice recognition system  There may be translation, syntax,  or grammatical errors  If you have any questions, please contact the dictating provider      Aayush Mays DO  Psychiatry Resident, PGY-1

## 2022-07-26 NOTE — NURSING NOTE
PT observed with no further outburst behavior after PRN administration  Cooperative and pleasant towards staff for the rest of this shift, Pt denies SI/HI/VH/AH, occasionally joking with writer  Meds and meal compliant

## 2022-07-26 NOTE — PROGRESS NOTES
07/25/22 3160   Team Meeting   Meeting Type Daily Rounds   Team Members Present   Team Members Present Physician;Nurse;   Physician Team Member Dr Corinna Koo Team Member [address] Management Team Member Sunitha   Patient/Family Present   Patient Present No   Patient's Family Present No   Pt is medication compliant  Pt remains in behavioral control  Remains on 1:1 while awake  Pt's Depakote was increased  Discharge pending for Tuesday

## 2022-07-28 ENCOUNTER — PATIENT OUTREACH (OUTPATIENT)
Dept: FAMILY MEDICINE CLINIC | Facility: CLINIC | Age: 52
End: 2022-07-28

## 2022-07-28 ENCOUNTER — OFFICE VISIT (OUTPATIENT)
Dept: FAMILY MEDICINE CLINIC | Facility: CLINIC | Age: 52
End: 2022-07-28
Payer: MEDICARE

## 2022-07-28 VITALS
HEIGHT: 66 IN | OXYGEN SATURATION: 95 % | TEMPERATURE: 97.8 F | HEART RATE: 94 BPM | RESPIRATION RATE: 16 BRPM | SYSTOLIC BLOOD PRESSURE: 100 MMHG | WEIGHT: 289.6 LBS | BODY MASS INDEX: 46.54 KG/M2 | DIASTOLIC BLOOD PRESSURE: 68 MMHG

## 2022-07-28 DIAGNOSIS — F39 MOOD DISORDER (HCC): Primary | ICD-10-CM

## 2022-07-28 DIAGNOSIS — F25.0 SCHIZOAFFECTIVE DISORDER, BIPOLAR TYPE (HCC): Chronic | ICD-10-CM

## 2022-07-28 DIAGNOSIS — T65.92XD: ICD-10-CM

## 2022-07-28 PROCEDURE — 99495 TRANSJ CARE MGMT MOD F2F 14D: CPT | Performed by: NURSE PRACTITIONER

## 2022-07-28 NOTE — PROGRESS NOTES
Subjective:   Chief Complaint   Patient presents with    Transition of Care Management        Patient ID: Rox Coley is a 49 y o  female  Presents today for transition of care  Patient was hospitalized on July 15th for mood disturbance, schizoaffective disorder bipolar type, and an intentional drug poisoning for suicidal attempt  She was supposed to go in to a partial hospitalization program however patient said this can not work  She was unclear as to why she could not do the partial hospitalization other than she needed to go back to work  She has reached out and tried to get connected with her previous psychiatrist but there is some problems again related to the fact she is post to be doing partial hospitalization program   Her previous therapist has left the company and her  saw her once while she was in the hospital but that was her last week at work as well  She has no follow-up appointments at this time with psych  Did discuss with her that I would reach out to the  to see what we could do about coordinating and getting her back with psychiatry  At this time all of her medications are being given weekly and bubble wrapped so that she could not be again hoarding medication to attempt suicide  Patient is somewhat said do today as it is coming up on the 1 year anniversary of her mother's death  However she denies any suicidal ideation or plans  Medication reviewed      The following portions of the patient's history were reviewed and updated as appropriate: allergies, current medications, past family history, past medical history, past social history, past surgical history and problem list     Review of Systems   Constitutional: Negative for chills, fatigue and fever  Respiratory: Negative for cough and shortness of breath  Cardiovascular: Negative for palpitations     Psychiatric/Behavioral: Positive for dysphoric mood (being treated) and sleep disturbance (since getting out of the hospital)  The patient is nervous/anxious (being treated)  Objective:  Vitals:    07/28/22 0825   BP: 100/68   BP Location: Left arm   Patient Position: Sitting   Cuff Size: Large   Pulse: 94   Resp: 16   Temp: 97 8 °F (36 6 °C)   SpO2: 95%   Weight: 131 kg (289 lb 9 6 oz)   Height: 5' 6" (1 676 m)      Physical Exam  Constitutional:       Appearance: Normal appearance  She is obese  Cardiovascular:      Rate and Rhythm: Normal rate and regular rhythm  Pulses: Normal pulses  Heart sounds: Normal heart sounds  Pulmonary:      Effort: Pulmonary effort is normal       Breath sounds: Normal breath sounds  Skin:     General: Skin is warm and dry  Capillary Refill: Capillary refill takes less than 2 seconds  Neurological:      Mental Status: She is alert and oriented to person, place, and time  Psychiatric:         Mood and Affect: Affect is flat  Speech: Speech normal          Behavior: Behavior is cooperative  Thought Content: Thought content does not include suicidal ideation  Thought content does not include suicidal plan  Assessment/Plan:    No problem-specific Assessment & Plan notes found for this encounter         Diagnoses and all orders for this visit:    Mood disorder [facility])  Comments:  Needs assistance with getting psych appointment  Orders:  -     Ambulatory Referral to Social Work Care Management Program; Future    Schizoaffective disorder, bipolar type [facility])  Comments:  Needs assistance with getting psych appointment  Orders:  -     Ambulatory Referral to Social Work Care Management Program; Future    Suicidal deliberate poisoning, subsequent encounter  Comments:  Needs assistance with getting psych appointment

## 2022-07-28 NOTE — PROGRESS NOTES
RAIN HERNANDEZ received a referral from JHONATAN regarding patient's need for follow up due to needing to connect with psychiatry  RAIN HERNANDEZ spoke with patient at this time who states she was originally scheduled for partial hospitalization program but after reading more into the parameters of the program, she did not agree with it and chose to cancel the appointment  Patient is refusing parital hospitalization program but is interested in having psychiatry  Patient does have services through [address] however states there was a change in her  and she is not sure who is on her team at this point and who is supposed to be helping her to set up mental health services  RAIN HERNANDEZ contacted [address] to gather some additional information  RAIN HERNANDEZ was informed that patient's previous  did leave last week  RAIN HERNANDEZ was transferred to [facility] who is the "team B lead"  RAIN HERNANDEZ LVM for Chloe at this time requesting a call in return to discuss patient's case  RAIN HERNANDEZ will await a call in return to assist patient in establishing services with psychiatry

## 2022-08-02 ENCOUNTER — TELEPHONE (OUTPATIENT)
Dept: OBGYN CLINIC | Facility: HOSPITAL | Age: 52
End: 2022-08-02

## 2022-08-02 ENCOUNTER — PATIENT OUTREACH (OUTPATIENT)
Dept: FAMILY MEDICINE CLINIC | Facility: CLINIC | Age: 52
End: 2022-08-02

## 2022-08-02 NOTE — TELEPHONE ENCOUNTER
Patient called in with few questions regarding her surgery with Dr Cole Kingston on 9/20          [phone]

## 2022-08-03 ENCOUNTER — APPOINTMENT (OUTPATIENT)
Dept: LAB | Facility: CLINIC | Age: 52
End: 2022-08-03
Payer: MEDICARE

## 2022-08-03 DIAGNOSIS — E03.9 HYPOTHYROIDISM: ICD-10-CM

## 2022-08-03 LAB — TSH SERPL DL<=0.05 MIU/L-ACNC: 13.9 UIU/ML (ref 0.45–4.5)

## 2022-08-03 PROCEDURE — 84443 ASSAY THYROID STIM HORMONE: CPT

## 2022-08-03 PROCEDURE — 36415 COLL VENOUS BLD VENIPUNCTURE: CPT

## 2022-08-04 ENCOUNTER — RA CDI HCC (OUTPATIENT)
Dept: OTHER | Facility: HOSPITAL | Age: 52
End: 2022-08-04

## 2022-08-04 DIAGNOSIS — E03.9 HYPOTHYROIDISM: ICD-10-CM

## 2022-08-04 RX ORDER — LEVOTHYROXINE SODIUM 0.1 MG/1
100 TABLET ORAL
Qty: 90 TABLET | Refills: 1 | Status: SHIPPED | OUTPATIENT
Start: 2022-08-04 | End: 2022-11-07

## 2022-08-07 ENCOUNTER — HOSPITAL ENCOUNTER (EMERGENCY)
Facility: HOSPITAL | Age: 52
Discharge: HOME | End: 2022-08-08
Attending: EMERGENCY MEDICINE
Payer: MEDICARE

## 2022-08-07 ENCOUNTER — APPOINTMENT (EMERGENCY)
Dept: RADIOLOGY | Facility: HOSPITAL | Age: 52
End: 2022-08-07
Payer: MEDICARE

## 2022-08-07 DIAGNOSIS — M25.511 CHRONIC RIGHT SHOULDER PAIN: ICD-10-CM

## 2022-08-07 DIAGNOSIS — G89.29 CHRONIC RIGHT SHOULDER PAIN: ICD-10-CM

## 2022-08-07 DIAGNOSIS — S46.811A STRAIN OF TRAPEZIUS MUSCLE, RIGHT, INITIAL ENCOUNTER: Primary | ICD-10-CM

## 2022-08-07 LAB
ANION GAP SERPL CALCULATED.3IONS-SCNC: 6 MMOL/L (ref 4–13)
BASOPHILS # BLD AUTO: 0.04 THOUSANDS/ΜL (ref 0–0.1)
BASOPHILS NFR BLD AUTO: 1 % (ref 0–1)
BUN SERPL-MCNC: 19 MG/DL (ref 5–25)
CALCIUM SERPL-MCNC: 8.6 MG/DL (ref 8.4–10.2)
CARDIAC TROPONIN I PNL SERPL HS: 2 NG/L
CHLORIDE SERPL-SCNC: 109 MMOL/L (ref 96–108)
CO2 SERPL-SCNC: 23 MMOL/L (ref 21–32)
CREAT SERPL-MCNC: 0.57 MG/DL (ref 0.6–1.3)
EOSINOPHIL # BLD AUTO: 0.13 THOUSAND/ΜL (ref 0–0.61)
EOSINOPHIL NFR BLD AUTO: 2 % (ref 0–6)
ERYTHROCYTE [DISTWIDTH] IN BLOOD BY AUTOMATED COUNT: 14 % (ref 11.6–15.1)
GFR SERPL CREATININE-BSD FRML MDRD: 108 ML/MIN/1.73SQ M
GLUCOSE SERPL-MCNC: 85 MG/DL (ref 65–140)
HCT VFR BLD AUTO: 40.5 % (ref 34.8–46.1)
HGB BLD-MCNC: 12.9 G/DL (ref 11.5–15.4)
IMM GRANULOCYTES # BLD AUTO: 0.11 THOUSAND/UL (ref 0–0.2)
IMM GRANULOCYTES NFR BLD AUTO: 1 % (ref 0–2)
LYMPHOCYTES # BLD AUTO: 2.55 THOUSANDS/ΜL (ref 0.6–4.47)
LYMPHOCYTES NFR BLD AUTO: 32 % (ref 14–44)
MCH RBC QN AUTO: 30.6 PG (ref 26.8–34.3)
MCHC RBC AUTO-ENTMCNC: 31.9 G/DL (ref 31.4–37.4)
MCV RBC AUTO: 96 FL (ref 82–98)
MONOCYTES # BLD AUTO: 0.89 THOUSAND/ΜL (ref 0.17–1.22)
MONOCYTES NFR BLD AUTO: 11 % (ref 4–12)
NEUTROPHILS # BLD AUTO: 4.3 THOUSANDS/ΜL (ref 1.85–7.62)
NEUTS SEG NFR BLD AUTO: 53 % (ref 43–75)
NRBC BLD AUTO-RTO: 0 /100 WBCS
PLATELET # BLD AUTO: 159 THOUSANDS/UL (ref 149–390)
PMV BLD AUTO: 10.7 FL (ref 8.9–12.7)
POTASSIUM SERPL-SCNC: 5.2 MMOL/L (ref 3.5–5.3)
RBC # BLD AUTO: 4.21 MILLION/UL (ref 3.81–5.12)
SODIUM SERPL-SCNC: 138 MMOL/L (ref 135–147)
WBC # BLD AUTO: 8.02 THOUSAND/UL (ref 4.31–10.16)

## 2022-08-07 PROCEDURE — 93005 ELECTROCARDIOGRAM TRACING: CPT

## 2022-08-07 PROCEDURE — 80048 BASIC METABOLIC PNL TOTAL CA: CPT | Performed by: EMERGENCY MEDICINE

## 2022-08-07 PROCEDURE — 85025 COMPLETE CBC W/AUTO DIFF WBC: CPT | Performed by: EMERGENCY MEDICINE

## 2022-08-07 PROCEDURE — 36415 COLL VENOUS BLD VENIPUNCTURE: CPT | Performed by: EMERGENCY MEDICINE

## 2022-08-07 PROCEDURE — 71045 X-RAY EXAM CHEST 1 VIEW: CPT

## 2022-08-07 PROCEDURE — 99284 EMERGENCY DEPT VISIT MOD MDM: CPT | Performed by: EMERGENCY MEDICINE

## 2022-08-07 PROCEDURE — 96372 THER/PROPH/DIAG INJ SC/IM: CPT

## 2022-08-07 PROCEDURE — 84484 ASSAY OF TROPONIN QUANT: CPT | Performed by: EMERGENCY MEDICINE

## 2022-08-07 PROCEDURE — 99284 EMERGENCY DEPT VISIT MOD MDM: CPT

## 2022-08-07 RX ORDER — KETOROLAC TROMETHAMINE 30 MG/ML
30 INJECTION, SOLUTION INTRAMUSCULAR; INTRAVENOUS ONCE
Status: COMPLETED | OUTPATIENT
Start: 2022-08-07 | End: 2022-08-07

## 2022-08-07 RX ADMIN — KETOROLAC TROMETHAMINE 30 MG: 30 INJECTION, SOLUTION INTRAMUSCULAR at 22:43

## 2022-08-07 NOTE — Clinical Note
Juventino Jada was seen and treated in our emergency department on 8/7/2022  Diagnosis:     Pito Chavez  may return to school on return date  She may return on this date: 08/10/2022         If you have any questions or concerns, please don't hesitate to call        Benitez Ortega RN    ______________________________           _______________          _______________  Hospital Representative                              Date                                Time

## 2022-08-07 NOTE — Clinical Note
Mary Ellen Rosenthal was seen and treated in our emergency department on 8/7/2022  Diagnosis:     Marylen Estelle    She may return on this date: If you have any questions or concerns, please don't hesitate to call        Vandana Ramirez MD    ______________________________           _______________          _______________  Hospital Representative                              Date                                Time

## 2022-08-07 NOTE — Clinical Note
Ramez Ag was seen and treated in our emergency department on 8/7/2022  Diagnosis:     Fariha Andres    She may return on this date: If you have any questions or concerns, please don't hesitate to call        Carla Barreto MD    ______________________________           _______________          _______________  Hospital Representative                              Date                                Time

## 2022-08-08 VITALS
OXYGEN SATURATION: 97 % | HEART RATE: 64 BPM | SYSTOLIC BLOOD PRESSURE: 107 MMHG | RESPIRATION RATE: 18 BRPM | TEMPERATURE: 98.1 F | DIASTOLIC BLOOD PRESSURE: 61 MMHG

## 2022-08-08 LAB
ATRIAL RATE: 63 BPM
P AXIS: 34 DEGREES
PR INTERVAL: 196 MS
QRS AXIS: -2 DEGREES
QRSD INTERVAL: 88 MS
QT INTERVAL: 402 MS
QTC INTERVAL: 411 MS
T WAVE AXIS: -1 DEGREES
VENTRICULAR RATE: 63 BPM

## 2022-08-08 PROCEDURE — 93010 ELECTROCARDIOGRAM REPORT: CPT | Performed by: INTERNAL MEDICINE

## 2022-08-08 RX ORDER — CYCLOBENZAPRINE HCL 10 MG
10 TABLET ORAL ONCE
Status: COMPLETED | OUTPATIENT
Start: 2022-08-08 | End: 2022-08-08

## 2022-08-08 RX ORDER — ACETAMINOPHEN 325 MG/1
975 TABLET ORAL ONCE
Status: COMPLETED | OUTPATIENT
Start: 2022-08-08 | End: 2022-08-08

## 2022-08-08 RX ORDER — GABAPENTIN 300 MG/1
300 CAPSULE ORAL 3 TIMES DAILY
Qty: 30 CAPSULE | Refills: 0 | Status: SHIPPED | OUTPATIENT
Start: 2022-08-08 | End: 2022-08-10 | Stop reason: SDUPTHER

## 2022-08-08 RX ORDER — GABAPENTIN 300 MG/1
300 CAPSULE ORAL ONCE
Status: COMPLETED | OUTPATIENT
Start: 2022-08-08 | End: 2022-08-08

## 2022-08-08 RX ADMIN — ACETAMINOPHEN 975 MG: 325 TABLET ORAL at 00:39

## 2022-08-08 RX ADMIN — GABAPENTIN 300 MG: 300 CAPSULE ORAL at 01:39

## 2022-08-08 RX ADMIN — CYCLOBENZAPRINE HYDROCHLORIDE 10 MG: 10 TABLET, FILM COATED ORAL at 00:39

## 2022-08-08 NOTE — ED ATTENDING ATTESTATION
8/7/2022  IIsi MD, saw and evaluated the patient  I have discussed the patient with the resident/non-physician practitioner and agree with the resident's/non-physician practitioner's findings, Plan of Care, and MDM as documented in the resident's/non-physician practitioner's note, except where noted  All available labs and Radiology studies were reviewed  I was present for key portions of any procedure(s) performed by the resident/non-physician practitioner and I was immediately available to provide assistance  At this point I agree with the current assessment done in the Emergency Department  I have conducted an independent evaluation of this patient a history and physical is as follows:    ED Course  ED Course as of 08/08/22 0958   [facility] Aug 07, 2022   258 49year old female presenting to the ED with right shoulder pain since 7/15 after she was physically transferred by EMS personnel during transportation between two medical facilities  Since then, patient has been having right shoulder pain which has not been responsive to two different muscle relaxants  Patient presents to the emergency department today because the pain has transitioned from feeling like a muscle soreness to a stabbing pain radiating into her right anterior chest   Patient reports feeling like an elephant standing on her chest and also reports some shortness of breath  Denies fever, nausea, vomiting, abdominal pain, urinary symptoms, changes in stool, new leg swelling, leg pain, rash  PMH bipolar disorder  Vital signs on arrival within normal limits  Physical exam is remarkable for reproducible tenderness of the right trapezius, right anterior chest wall, right armpit on palpation  Patient also reports reproducible tenderness on range of motion of the shoulder, however patient does not appear to be exceptionally uncomfortable  Patient is in no respiratory distress    No specific findings on physical exam otherwise  Differential diagnosis includes ACS, musculoskeletal sprain, dislocation, fracture, ligamentous injury  Lab work and imaging was ordered  Toradol administered for pain  2304 ECG 12 lead  No STEMI   Mon Aug 08, 2022   0009 hs TnI 0hr: 2  Lab work including CBC, CMP, troponin are grossly unremarkable  0012 XR chest 1 view portable  No acute findings   0137 After receiving Toradol, then Tylenol and Flexeril, and gabapentin patient states that her pain has mildly improved and feels comfortable enough to follow-up as an outpatient  Referral was given to Orthopedics  Patient remained clinically stable in the emergency department for 3 hours 33 minutes without evidence of impending cardiopulmonary instability  Strict return precautions given  Upon discharge, patient was fully alert, oriented, GCS 15, ambulatory, without any further complaints           Critical Care Time  Procedures

## 2022-08-08 NOTE — DISCHARGE INSTRUCTIONS
Please make sure to follow-up with orthopedics for further evaluation and potential treatments  If you develop worsening symptoms including chest pain, shortness of breath, inability to move your arm, severe numbness or tingling, or any other concerning symptoms, please return to the emergency department as these could be signs of worsening illness

## 2022-08-08 NOTE — ED PROVIDER NOTES
History  Chief Complaint   Patient presents with    Shoulder Injury     Patient comes in reporting right shoulder pain since July  Reports she was injured during transfer  Was seen before at ED and seen by chiropractor w/o relief  States pain is getting worse and now radiating into neck  Reports being unable to sleep due to pain  Patient is a 27-year-old female with past medical history of suicidal ideation, bipolar disorder who came in complaining of right shoulder pain radiating to the neck since July  Patient reports she acquired the injury during a transfer when she was picked up from her axillary area  Patient stated she has seen a chiropractor and has been taking muscle relaxant and Tylenol to no help  Patient stated the pain expand from the shoulder area all the way to the trapezius  Patient denies numbness or weakness in the arm  She reports no headache or fever  Patient also complained of pain in the right chest area with a sensation of pressure  Prior to Admission Medications   Prescriptions Last Dose Informant Patient Reported? Taking?    Cholecalciferol (Vitamin D3) 125 MCG (5000 UT) CAPS  Self Yes No   Fesoterodine Fumarate ER (Toviaz) 4 MG TB24  Self Yes No   Sig: Take 1 tablet by mouth daily   RABEprazole (ACIPHEX) 20 MG tablet  Self Yes No   Sig: Take 20 mg by mouth 2 (two) times a day   albuterol (Ventolin HFA) 90 mcg/act inhaler   No No   Sig: Inhale 2 puffs every 6 (six) hours as needed for wheezing   amLODIPine (NORVASC) 5 mg tablet  Self No No   Sig: TAKE 1 TABLET (5 MG TOTAL) BY MOUTH DAILY   aspirin (ECOTRIN LOW STRENGTH) 81 mg EC tablet   Yes No   Sig: Take 81 mg by mouth daily   Patient not taking: No sig reported   buPROPion (WELLBUTRIN XL) 150 mg 24 hr tablet   No No   Sig: Take 1 tablet (150 mg total) by mouth daily   cetirizine (ZyrTEC) 10 mg tablet   Yes No   Patient not taking: No sig reported   colestipol (COLESTID) 1 g tablet   No No   Sig: Take 1 tablet (1 g total) by mouth 2 (two) times a day   divalproex sodium (DEPAKOTE) 250 mg EC tablet   No No   Sig: Take 3 tablets (750 mg total) by mouth every 12 (twelve) hours   estradiol (ESTRACE) 0 5 MG tablet   No No   Sig: TAKE 1 TABLET (0 5 MG TOTAL) BY MOUTH DAILY   fluticasone (FLOVENT HFA) 110 MCG/ACT inhaler  Self Yes No   Sig: Inhale 2 puffs 2 (two) times a day Rinse mouth after use     furosemide (LASIX) 20 mg tablet   No No   Sig: TAKE 1 TABLET BY MOUTH DAILY   hydrOXYzine HCL (ATARAX) 25 mg tablet   No No   Sig: Take 1 tablet (25 mg total) by mouth every 6 (six) hours as needed for itching (mild anxiety)   levothyroxine 100 mcg tablet   No No   Sig: Take 1 tablet (100 mcg total) by mouth daily in the early morning   losartan (COZAAR) 50 mg tablet  Self No No   Sig: TAKE ONE TABLET BY MOUTH DAILY   lurasidone 60 MG TABS   No No   Sig: Take 1 tablet (60 mg total) by mouth daily with dinner   lurasidone 60 MG TABS   No No   Sig: Take 1 tablet (60 mg total) by mouth daily with breakfast   melatonin 3 mg   No No   Sig: Take 1 tablet (3 mg total) by mouth daily at bedtime as needed (as need for sleep)   metoprolol tartrate (LOPRESSOR) 25 mg tablet  Self No No   Sig: Take 1 tablet (25 mg total) by mouth every 12 (twelve) hours   pilocarpine (SALAGEN) 5 mg tablet  Self Yes No   Sig: Take 5 mg by mouth 2 (two) times a day   rOPINIRole (REQUIP) 0 5 mg tablet  Self Yes No   senna-docusate sodium (SENOKOT-S) 8 6-50 mg per tablet   Yes No   Sig: Take 2 tablets by mouth daily   Patient not taking: No sig reported   topiramate (TOPAMAX) 100 mg tablet   No No   Sig: Take 1 tablet (100 mg total) by mouth 2 (two) times a day   Patient taking differently: Take 150 mg by mouth 2 (two) times a day      Facility-Administered Medications: None       Past Medical History:   Diagnosis Date    Anxiety     Arthritis     oa cassandra knees    Asthma     good control- no medications    Yan's esophagus     Bipolar affective disorder ([facility] [address] )     Chronic pain disorder     lumbar    CPAP (continuous positive airway pressure) dependence     Degenerative disc disease at L5-S1 level     Deliberate self-cutting     Depression 09/16/2008    Disease of thyroid gland     hypo    MARTINEZ (dyspnea on exertion)     Drug overdose 10/28/2008    suicide attempt    Dysphagia     Dyspnea     Edema     BLE    GERD (gastroesophageal reflux disease)     High blood pressure     History of COVID-19 12/30/2020    Symptoms started on 12/30/2020 and then got worse  Today she is feeling a little bit better  She is a candidate for monoclonal antibody infusion and set for 1/6/21 @ 1pm at [facility]  01/07/21 - telemedicine -     Hypertension     Knee pain, bilateral     Especially right    Migraines     Obese     Overactive bladder     Sjogren's disease ([address] )     Sleep apnea     Stress incontinence     Suicidal ideations     Umbilical hernia     surgical repair today 4/24/2019    Use of cane as ambulatory aid     awaiting b/l knee replacement    Wears glasses        Past Surgical History:   Procedure Laterality Date    BREAST CYST EXCISION Right 1989    CARPAL TUNNEL RELEASE Left     CHOLECYSTECTOMY  05/2003    Laparoscopic    COLONOSCOPY      01/12/2009    DILATION AND CURETTAGE OF UTERUS      ELBOW SURGERY Left     x2   No hardware    ESOPHAGOGASTRODUODENOSCOPY      FOOT SURGERY Left     Plantar fasciotomy    HYSTERECTOMY      laporoscopic, partial    KNEE ARTHROSCOPY Bilateral     OOPHORECTOMY Left 10/2015    OH ANAL SPHINCTEROTOMY N/A 08/31/2018    Procedure: EUA, LEFT PARTIAL INTERNAL SPHINCTEROTOMY;  Surgeon: Moe Saleh MD;  Location: [address] [place] [address] OR;  Service: Colorectal    OH GASTROCNEMIUS RECESSION Left 02/24/2021    Procedure: RECESSION GASTROC OPEN;  Surgeon: Blake Burroughs DPM;  Location: [address] [place] [address] OR;  Service: [place] [address] LRWB,3+W/A,MBRIC N/A 04/24/2019    Procedure: REPAIR HERNIA UMBILICAL LAPAROSCOPIC W/ ROBOTICS;  Surgeon: Phillip Huertas MD;  Location: [facility] OR;  Service: General    REDUCTION MAMMAPLASTY Bilateral 1999    REPAIR LACERATION Left     left hand-2009    REPLACEMENT TOTAL KNEE Right     ROTATOR CUFF REPAIR Left     TONSILECTOMY AND ADNOIDECTOMY      TONSILLECTOMY      US GUIDED MSK PROCEDURE  2021    VEIN LIGATION Bilateral     WISDOM TOOTH EXTRACTION         Family History   Problem Relation Age of Onset    Kidney cancer Mother     Diabetes Mother     No Known Problems Father     No Known Problems Sister     No Known Problems Maternal Grandmother     No Known Problems Maternal Grandfather     No Known Problems Paternal Grandmother     No Known Problems Paternal Grandfather     Colon cancer Maternal Uncle     Colon cancer Maternal Uncle     Colon cancer Paternal Uncle     Colon cancer Family      I have reviewed and agree with the history as documented  E-Cigarette/Vaping    E-Cigarette Use Current Some Day User     Comments medical THC      E-Cigarette/Vaping Substances    Nicotine No     THC Yes     CBD No     Flavoring No     Other No     Unknown No      Social History     Tobacco Use    Smoking status: Former Smoker     Packs/day: 2 00     Years: 33 00     Pack years: 66 00     Types: Cigarettes     Start date: 5     Quit date: 2018     Years since quittin 6    Smokeless tobacco: Never Used    Tobacco comment: Started at age 14  Vaping Use    Vaping Use: Some days    Substances: THC   Substance Use Topics    Alcohol use: Not Currently     Comment: Recovering alcoholic    Drug use: Yes     Types: Marijuana, Methamphetamines     Comment: medical- vapes 3 times per wk at         Review of Systems   Constitutional: Negative for chills and fever  HENT: Negative for ear pain and sore throat  Eyes: Negative for pain and visual disturbance  Respiratory: Negative for cough and shortness of breath      Cardiovascular: Negative for chest pain and palpitations  Gastrointestinal: Negative for abdominal pain and vomiting  Genitourinary: Negative for dysuria and hematuria  Musculoskeletal: Negative for arthralgias and back pain  Patient reports right shoulder pain radiating to the trapezius on the right side  Skin: Negative for color change and rash  Neurological: Negative for seizures, syncope, weakness, numbness and headaches  Psychiatric/Behavioral: Negative  All other systems reviewed and are negative  Physical Exam  ED Triage Vitals   Temperature Pulse Respirations Blood Pressure SpO2   08/07/22 2209 08/07/22 2207 08/07/22 2207 08/07/22 2207 08/07/22 2207   98 1 °F (36 7 °C) 68 18 138/63 98 %      Temp Source Heart Rate Source Patient Position - Orthostatic VS BP Location FiO2 (%)   08/07/22 2209 08/07/22 2207 08/07/22 2207 08/07/22 2207 --   Oral Monitor Sitting Right arm       Pain Score       08/07/22 2207       8             Orthostatic Vital Signs  Vitals:    08/08/22 0000 08/08/22 0030 08/08/22 0045 08/08/22 0100   BP: 118/56 97/51  107/61   Pulse: 60 60 72 64   Patient Position - Orthostatic VS:           Physical Exam  Vitals and nursing note reviewed  Constitutional:       General: She is not in acute distress  Appearance: She is well-developed  HENT:      Head: Normocephalic and atraumatic  Eyes:      Conjunctiva/sclera: Conjunctivae normal    Neck:      Comments: Patient reported pain in the trapezius area on the right side with neck movement  Cardiovascular:      Rate and Rhythm: Normal rate and regular rhythm  Heart sounds: No murmur heard  Pulmonary:      Effort: Pulmonary effort is normal  No respiratory distress  Breath sounds: Normal breath sounds  Abdominal:      Palpations: Abdomen is soft  Tenderness: There is no abdominal tenderness  Musculoskeletal:      Cervical back: Neck supple  Skin:     General: Skin is warm and dry     Neurological:      General: No focal deficit present  Mental Status: She is alert and oriented to person, place, and time           ED Medications  Medications   ketorolac (TORADOL) injection 30 mg (30 mg Intramuscular Given 8/7/22 2243)   acetaminophen (TYLENOL) tablet 975 mg (975 mg Oral Given 8/8/22 0039)   cyclobenzaprine (FLEXERIL) tablet 10 mg (10 mg Oral Given 8/8/22 0039)   gabapentin (NEURONTIN) capsule 300 mg (300 mg Oral Given 8/8/22 0139)       Diagnostic Studies  Results Reviewed     Procedure Component Value Units Date/Time    HS Troponin 0hr (reflex protocol) [[id number]]  (Normal) Collected: 08/07/22 2314    Lab Status: Final result Specimen: Blood from Arm, Left Updated: 08/07/22 2346     hs TnI 0hr 2 ng/L     Basic metabolic panel [[id number]]  (Abnormal) Collected: 08/07/22 2314    Lab Status: Final result Specimen: Blood from Arm, Left Updated: 08/07/22 2341     Sodium 138 mmol/L      Potassium 5 2 mmol/L      Chloride 109 mmol/L      CO2 23 mmol/L      ANION GAP 6 mmol/L      BUN 19 mg/dL      Creatinine 0 57 mg/dL      Glucose 85 mg/dL      Calcium 8 6 mg/dL      eGFR 108 ml/min/1 73sq m     Narrative:      [place] guidelines for Chronic Kidney Disease (CKD):     Stage 1 with normal or high GFR (GFR > 90 mL/min/1 73 square meters)    Stage 2 Mild CKD (GFR = 60-89 mL/min/1 73 square meters)    Stage 3A Moderate CKD (GFR = 45-59 mL/min/1 73 square meters)    Stage 3B Moderate CKD (GFR = 30-44 mL/min/1 73 square meters)    Stage 4 Severe CKD (GFR = 15-29 mL/min/1 73 square meters)    Stage 5 End Stage CKD (GFR <15 mL/min/1 73 square meters)  Note: GFR calculation is accurate only with a steady state creatinine    CBC and differential [[id number]] Collected: 08/07/22 2314    Lab Status: Final result Specimen: Blood from Arm, Left Updated: 08/07/22 2323     WBC 8 02 Thousand/uL      RBC 4 21 Million/uL      Hemoglobin 12 9 g/dL      Hematocrit 40 5 %      MCV 96 fL      MCH 30 6 pg      MCHC 31 9 g/dL RDW 14 0 %      MPV 10 7 fL      Platelets 161 Thousands/uL      nRBC 0 /100 WBCs      Neutrophils Relative 53 %      Immat GRANS % 1 %      Lymphocytes Relative 32 %      Monocytes Relative 11 %      Eosinophils Relative 2 %      Basophils Relative 1 %      Neutrophils Absolute 4 30 Thousands/µL      Immature Grans Absolute 0 11 Thousand/uL      Lymphocytes Absolute 2 55 Thousands/µL      Monocytes Absolute 0 89 Thousand/µL      Eosinophils Absolute 0 13 Thousand/µL      Basophils Absolute 0 04 Thousands/µL                  XR chest 1 view portable   Final Result by Obed Guadarrama MD (08/08 1641)      No acute cardiopulmonary disease  Workstation performed: [id number]               Procedures  Procedures      ED Course                             SBIRT 22yo+    Flowsheet Row Most Recent Value   SBIRT (23 yo +)    In order to provide better care to our patients, we are screening all of our patients for alcohol and drug use  Would it be okay to ask you these screening questions? Unable to answer at this time Filed at: 08/07/2022 2356                MDM  Number of Diagnoses or Management Options  Chronic right shoulder pain  Strain of trapezius muscle, right, initial encounter  Diagnosis management comments: Patient is a 31-year-old female past medical history of suicide ideation bipolar disorder that came in complaining of right shoulder pain radiating to the trapezium area  Patient acquired injury during a transfer by EMS to a facility  In the ED patient looks uncomfortable, complains of pain  Patient was given a shot of Toradol for pain  EKG, troponin and chest x-ray were unremarkable  After receiving Toradol, on  re-evaluation patient  still complained of pain  Patient was then given Tylenol, Flexeril, and gabapentin with mildly improvement of her symptoms  Patient states she feels comfortable enough to follow-up with orthopedic and was discharged        Disposition  Final diagnoses: Strain of trapezius muscle, right, initial encounter   Chronic right shoulder pain     Time reflects when diagnosis was documented in both MDM as applicable and the Disposition within this note     Time User Action Codes Description Comment    8/8/2022 12:20 AM Frances Hendricks Add [D64 539R] Strain of trapezius muscle, right, initial encounter     8/8/2022 12:20 AM Frances Hendricks Add [I21 275,  G89 29] Chronic right shoulder pain       ED Disposition     ED Disposition   Discharge    Condition   Stable    Date/Time   Mon Aug 8, 2022 12:20 AM    Comment   [facility] discharge to home/self care  Follow-up Information     Follow up With Specialties Details Why Contact Info Additional [address] [facility] Schedule an appointment as soon as possible for a visit in 1 week As needed, If symptoms worsen [address] [address]  [facility] [address], [facility] [address] [place], [place], [address] [place] [address]          Discharge Medication List as of 8/8/2022  1:37 AM      START taking these medications    Details   gabapentin (Neurontin) 300 mg capsule Take 1 capsule (300 mg total) by mouth 3 (three) times a day for 10 days For post-herpetic neuralgia:  Take 1 tablet on day 1,  Then take 2 tablets on day 2, Then take 3 tablets on day 3 and every day after that as instructed by your doctor , Starting Mon 8/8/2022, Until Thu 8/18/2022, Normal         CONTINUE these medications which have NOT CHANGED    Details   albuterol (Ventolin HFA) 90 mcg/act inhaler Inhale 2 puffs every 6 (six) hours as needed for wheezing, Starting Mon 6/20/2022, Normal      amLODIPine (NORVASC) 5 mg tablet TAKE 1 TABLET (5 MG TOTAL) BY MOUTH DAILY, Starting Wed 5/18/2022, Normal      aspirin (ECOTRIN LOW STRENGTH) 81 mg EC tablet Take 81 mg by mouth daily, Historical Med      buPROPion (WELLBUTRIN XL) 150 mg 24 hr tablet Take 1 tablet (150 mg total) by mouth daily, Starting Tue 7/26/2022, Until Thu 8/25/2022, Normal      cetirizine (ZyrTEC) 10 mg tablet Starting Fri 5/13/2022, Historical Med      Cholecalciferol (Vitamin D3) 125 MCG (5000 UT) CAPS Starting Wed 5/25/2022, Historical Med      colestipol (COLESTID) 1 g tablet Take 1 tablet (1 g total) by mouth 2 (two) times a day, Starting Sat 7/16/2022, Until Mon 8/15/2022, No Print      divalproex sodium (DEPAKOTE) 250 mg EC tablet Take 3 tablets (750 mg total) by mouth every 12 (twelve) hours, Starting Mon 7/25/2022, Until Wed 8/24/2022, Normal      estradiol (ESTRACE) 0 5 MG tablet TAKE 1 TABLET (0 5 MG TOTAL) BY MOUTH DAILY, Starting Wed 7/13/2022, Normal      Fesoterodine Fumarate ER (Toviaz) 4 MG TB24 Take 1 tablet by mouth daily, Historical Med      fluticasone (FLOVENT HFA) 110 MCG/ACT inhaler Inhale 2 puffs 2 (two) times a day Rinse mouth after use , Historical Med      furosemide (LASIX) 20 mg tablet TAKE 1 TABLET BY MOUTH DAILY, Normal      hydrOXYzine HCL (ATARAX) 25 mg tablet Take 1 tablet (25 mg total) by mouth every 6 (six) hours as needed for itching (mild anxiety), Starting Tue 7/26/2022, Until Thu 8/25/2022 at 2359, Normal      levothyroxine 100 mcg tablet Take 1 tablet (100 mcg total) by mouth daily in the early morning, Starting Thu 8/4/2022, Until Sat 9/3/2022, Normal      losartan (COZAAR) 50 mg tablet TAKE ONE TABLET BY MOUTH DAILY, Normal      !! lurasidone 60 MG TABS Take 1 tablet (60 mg total) by mouth daily with dinner, Starting Mon 7/25/2022, Until Wed 8/24/2022, Normal      !! lurasidone 60 MG TABS Take 1 tablet (60 mg total) by mouth daily with breakfast, Starting Tue 7/26/2022, Until Thu 8/25/2022, Normal      melatonin 3 mg Take 1 tablet (3 mg total) by mouth daily at bedtime as needed (as need for sleep), Starting Fri 7/15/2022, Until Sun 8/14/2022 at 2359, Normal      metoprolol tartrate (LOPRESSOR) 25 mg tablet Take 1 tablet (25 mg total) by mouth every 12 (twelve) hours, Starting Thu 3/24/2022, Until Thu 2022, Normal      pilocarpine (SALAGEN) 5 mg tablet Take 5 mg by mouth 2 (two) times a day, Historical Med      RABEprazole (ACIPHEX) 20 MG tablet Take 20 mg by mouth 2 (two) times a day, Historical Med      rOPINIRole (REQUIP) 0 5 mg tablet Starting Wed 2022, Historical Med      senna-docusate sodium (SENOKOT-S) 8 6-50 mg per tablet Take 2 tablets by mouth daily, Historical Med      topiramate (TOPAMAX) 100 mg tablet Take 1 tablet (100 mg total) by mouth 2 (two) times a day, Starting Mon 1/10/2022, Until Thu 2022, Print       !! - Potential duplicate medications found  Please discuss with provider  PDMP Review       Value Time User    PDMP Reviewed  Yes 1/10/2022  8:33 AM Kaur Alcazar PA-C           ED Provider  Attending physically available and evaluated Vladislav Cooper  MICHELET managed the patient along with the ED Attending      Electronically Signed by         Rexene Apgar, MD  [phone]

## 2022-08-10 ENCOUNTER — PATIENT OUTREACH (OUTPATIENT)
Dept: FAMILY MEDICINE CLINIC | Facility: CLINIC | Age: 52
End: 2022-08-10

## 2022-08-10 ENCOUNTER — OFFICE VISIT (OUTPATIENT)
Dept: FAMILY MEDICINE CLINIC | Facility: CLINIC | Age: 52
End: 2022-08-10
Payer: MEDICARE

## 2022-08-10 ENCOUNTER — APPOINTMENT (OUTPATIENT)
Dept: RADIOLOGY | Age: 52
End: 2022-08-10
Payer: MEDICARE

## 2022-08-10 VITALS
SYSTOLIC BLOOD PRESSURE: 144 MMHG | OXYGEN SATURATION: 97 % | BODY MASS INDEX: 47.09 KG/M2 | HEIGHT: 66 IN | DIASTOLIC BLOOD PRESSURE: 78 MMHG | WEIGHT: 293 LBS | HEART RATE: 83 BPM | RESPIRATION RATE: 18 BRPM | TEMPERATURE: 98.1 F

## 2022-08-10 DIAGNOSIS — I10 HYPERTENSION, UNSPECIFIED TYPE: ICD-10-CM

## 2022-08-10 DIAGNOSIS — S46.811A STRAIN OF TRAPEZIUS MUSCLE, RIGHT, INITIAL ENCOUNTER: ICD-10-CM

## 2022-08-10 DIAGNOSIS — M25.511 ACUTE PAIN OF RIGHT SHOULDER: Primary | ICD-10-CM

## 2022-08-10 DIAGNOSIS — M25.511 ACUTE PAIN OF RIGHT SHOULDER: ICD-10-CM

## 2022-08-10 PROCEDURE — 73030 X-RAY EXAM OF SHOULDER: CPT

## 2022-08-10 PROCEDURE — 99214 OFFICE O/P EST MOD 30 MIN: CPT | Performed by: NURSE PRACTITIONER

## 2022-08-10 RX ORDER — GABAPENTIN 300 MG/1
300 CAPSULE ORAL 3 TIMES DAILY
Qty: 90 CAPSULE | Refills: 0 | Status: SHIPPED | OUTPATIENT
Start: 2022-08-10 | End: 2022-09-15

## 2022-08-10 RX ORDER — AMLODIPINE BESYLATE 5 MG/1
5 TABLET ORAL DAILY
Qty: 30 TABLET | Refills: 1 | Status: SHIPPED | OUTPATIENT
Start: 2022-08-10 | End: 2022-10-05

## 2022-08-10 RX ORDER — HALOPERIDOL DECANOATE 50 MG/ML
INJECTION INTRAMUSCULAR
COMMUNITY
Start: 2022-08-03

## 2022-08-10 NOTE — PROGRESS NOTES
Assessment and Plan:     Problem List Items Addressed This Visit    None     Visit Diagnoses     Acute pain of right shoulder    -  Primary    xray ordered  If negative will try for MRI  concern with possible tear  Has appt with ortho in 2 weeks  Will cont neurontin for pain     Started ER    Relevant Orders    XR shoulder 2+ vw right    Strain of trapezius muscle, right, initial encounter        Relevant Medications    gabapentin (Neurontin) 300 mg capsule           Preventive health issues were discussed with patient, and age appropriate screening tests were ordered as noted in patient's After Visit Summary  Personalized health advice and appropriate referrals for health education or preventive services given if needed, as noted in patient's After Visit Summary  History of Present Illness:     Patient presents for a Medicare Wellness Visit    HPI   Was admitted for psychiatric issues    Had to be restrained and has pain in right shoulder and up to neck and right anterior chest wall  Anterior chest is very sharp  Has appt with ortho in 2 weeks  Was to ER twice for this      Out of work now for medical issues  Patient Care Team:  Scott Prasad as PCP - General (Nurse Practitioner)  Toya Lindsey MD as PCP - [address] [place], [place] [id number] [facility] ([facility])  Rosa M Bear MD (Endocrinology)  Hunter Penn as  Care Manager (Care Coordination)     Review of Systems:     Review of Systems   Constitutional: Positive for activity change  Negative for appetite change, diaphoresis, fatigue and fever  Respiratory: Negative for cough  Cardiovascular: Negative for chest pain  Gastrointestinal: Negative for constipation, diarrhea and nausea  Musculoskeletal: Positive for arthralgias, back pain, neck pain and neck stiffness  Psychiatric/Behavioral: Negative for self-injury  The patient is nervous/anxious           Problem List:     Patient Active Problem List   Diagnosis    Yan's esophagus determined by biopsy    Benign essential hypertension    Schizoaffective disorder, bipolar type ([facility])    Chronic low back pain    DDD (degenerative disc disease), lumbar    Edema    Family history of renal cancer    Hypothyroidism    Microhematuria    MAG (obstructive sleep apnea)    Spondylosis of lumbosacral joint without myelopathy    Overactive bladder    Primary osteoarthritis of both knees    Major depressive disorder    Sjogren's syndrome ([facility] [address] )    Marijuana abuse    COPD (chronic obstructive pulmonary disease) ([facility])    Mixed hyperlipidemia    Class 3 severe obesity due to excess calories with serious comorbidity and body mass index (BMI) of 40 0 to 44 9 in adult [facility])    Memory difficulties    History of COVID-19    Morbid obesity with BMI of 40 0-44 9, adult ([facility])    Medical clearance for psychiatric admission    Hemorrhage of rectum and anus    Medial epicondylitis of left elbow    Restrictive lung disease    Chronic tension-type headache, intractable    Potential for cognitive impairment    Mood disorder ([facility])    Substance abuse ([facility])    Cognitive impairment    Contusion of elbow    GERD (gastroesophageal reflux disease)    Diarrhea    Ecchymosis    Anxiety    Borderline personality disorder ([facility])    Platelets decreased ([facility] [address] )    Knee pain, right    Suicidal deliberate poisoning ([facility] )      Past Medical and Surgical History:     Past Medical History:   Diagnosis Date    Anxiety     Arthritis     oa cassandra knees    Asthma     good control- no medications    Yan's esophagus     Bipolar affective disorder ([facility])     Chronic pain disorder     lumbar    CPAP (continuous positive airway pressure) dependence     Degenerative disc disease at L5-S1 level     Deliberate self-cutting     Depression 09/16/2008    Disease of thyroid gland     hypo    MARTINEZ (dyspnea on exertion)     Drug overdose 10/28/2008    suicide attempt    Dysphagia     Dyspnea     Edema BLE    GERD (gastroesophageal reflux disease)     High blood pressure     History of COVID-19 12/30/2020    Symptoms started on 12/30/2020 and then got worse  Today she is feeling a little bit better  She is a candidate for monoclonal antibody infusion and set for 1/6/21 @ 1pm at [facility]  01/07/21 - telemedicine -     Hypertension     Knee pain, bilateral     Especially right    Migraines     Obese     Obesity     Overactive bladder     Sjogren's disease ([address] )     Sleep apnea     Stress incontinence     Suicidal ideations     Umbilical hernia     surgical repair today 4/24/2019    Use of cane as ambulatory aid     awaiting b/l knee replacement    Wears glasses      Past Surgical History:   Procedure Laterality Date    BREAST CYST EXCISION Right 1989    CARPAL TUNNEL RELEASE Left     CHOLECYSTECTOMY  05/2003    Laparoscopic    COLONOSCOPY      01/12/2009    DILATION AND CURETTAGE OF UTERUS      ELBOW SURGERY Left     x2   No hardware    ESOPHAGOGASTRODUODENOSCOPY      FOOT SURGERY Left     Plantar fasciotomy    HYSTERECTOMY      laporoscopic, partial    KNEE ARTHROSCOPY Bilateral     OOPHORECTOMY Left 10/2015    AZ ANAL SPHINCTEROTOMY N/A 08/31/2018    Procedure: EUA, LEFT PARTIAL INTERNAL SPHINCTEROTOMY;  Surgeon: Alex Cormier MD;  Location: [facility] MAIN OR;  Service: Colorectal    AZ GASTROCNEMIUS RECESSION Left 02/24/2021    Procedure: RECESSION GASTROC OPEN;  Surgeon: Mellissa Cervantes DPM;  Location: [facility] MAIN OR;  Service: [facility] BPIN,8+A/X,IDSJB N/A 04/24/2019    Procedure: REPAIR HERNIA UMBILICAL LAPAROSCOPIC W/ ROBOTICS;  Surgeon: Chela Campos MD;  Location: [facility] Main OR;  Service: General    REDUCTION MAMMAPLASTY Bilateral 1999    REPAIR LACERATION Left     left hand-5/18/2009    REPLACEMENT TOTAL KNEE Right     ROTATOR CUFF REPAIR Left     TONSILECTOMY AND ADNOIDECTOMY     [facility] PROCEDURE  04/22/2021    VEIN LIGATION Bilateral     WISDOM TOOTH EXTRACTION        Family History:     Family History   Problem Relation Age of Onset   [facility] Kidney cancer Mother     Diabetes Mother     No Known Problems Father     No Known Problems Sister     No Known Problems Maternal Grandmother     No Known Problems Maternal Grandfather     No Known Problems Paternal Grandmother     No Known Problems Paternal Grandfather     Colon cancer Maternal Uncle     Colon cancer Maternal Uncle     Colon cancer Paternal Uncle     Colon cancer Family       Social History:     Social History     Socioeconomic History    Marital status: Single     Spouse name: None    Number of children: None    Years of education: None    Highest education level: None   Occupational History    None   Tobacco Use    Smoking status: Former Smoker     Packs/day: 2 00     Years: 33 00     Pack years: 66 00     Types: Cigarettes     Start date: 5     Quit date: 2018     Years since quittin 6    Smokeless tobacco: Never Used    Tobacco comment: Started at age 14     Vaping Use    Vaping Use: Some days    Substances: THC   Substance and Sexual Activity    Alcohol use: Yes     Comment: Recovering alcoholic    Drug use: Yes     Types: Marijuana, Methamphetamines     Comment: medical- vapes 3 times per wk at hs    Sexual activity: Not Currently   Other Topics Concern    None   Social History Narrative    None     Social Determinants of Health     Financial Resource Strain: Not on file   Food Insecurity: Not on file   Transportation Needs: Not on file   Physical Activity: Not on file   Stress: Not on file   Social Connections: Not on file   Intimate Partner Violence: Not on file   Housing Stability: Not on file      Medications and Allergies:     Current Outpatient Medications   Medication Sig Dispense Refill    albuterol (Ventolin HFA) 90 mcg/act inhaler Inhale 2 puffs every 6 (six) hours as needed for wheezing 18 g 5    amLODIPine (NORVASC) 5 mg tablet TAKE 1 TABLET (5 MG TOTAL) BY MOUTH DAILY 30 tablet 2    buPROPion (WELLBUTRIN XL) 150 mg 24 hr tablet Take 1 tablet (150 mg total) by mouth daily 30 tablet 0    Cholecalciferol (Vitamin D3) 125 MCG (5000 UT) CAPS Take by mouth in the morning      colestipol (COLESTID) 1 g tablet Take 1 tablet (1 g total) by mouth 2 (two) times a day 60 tablet 0    divalproex sodium (DEPAKOTE) 250 mg EC tablet Take 3 tablets (750 mg total) by mouth every 12 (twelve) hours 180 tablet 0    estradiol (ESTRACE) 0 5 MG tablet TAKE 1 TABLET (0 5 MG TOTAL) BY MOUTH DAILY 30 tablet 1    Fesoterodine Fumarate ER (Toviaz) 4 MG TB24 Take 1 tablet by mouth daily      fluticasone (FLOVENT HFA) 110 MCG/ACT inhaler Inhale 2 puffs 2 (two) times a day Rinse mouth after use        furosemide (LASIX) 20 mg tablet TAKE 1 TABLET BY MOUTH DAILY 30 tablet 2    gabapentin (Neurontin) 300 mg capsule Take 1 capsule (300 mg total) by mouth 3 (three) times a day 90 capsule 0    haloperidol decanoate (Haldol Decanoate) 50 mg/mL injection       hydrOXYzine HCL (ATARAX) 25 mg tablet Take 1 tablet (25 mg total) by mouth every 6 (six) hours as needed for itching (mild anxiety) 60 tablet 0    levothyroxine 100 mcg tablet Take 1 tablet (100 mcg total) by mouth daily in the early morning 90 tablet 1    losartan (COZAAR) 50 mg tablet TAKE ONE TABLET BY MOUTH DAILY 30 tablet 10    lurasidone 60 MG TABS Take 1 tablet (60 mg total) by mouth daily with breakfast (Patient taking differently: Take 60 mg by mouth daily with breakfast TAKEN IN THE MORNING AND AT NIGHT) 30 tablet 0    pilocarpine (SALAGEN) 5 mg tablet Take 5 mg by mouth 2 (two) times a day      rOPINIRole (REQUIP) 0 5 mg tablet Take by mouth daily at bedtime      aspirin (ECOTRIN LOW STRENGTH) 81 mg EC tablet Take 81 mg by mouth daily (Patient not taking: No sig reported)      cetirizine (ZyrTEC) 10 mg tablet  (Patient not taking: No sig reported)      melatonin 3 mg Take 1 tablet (3 mg total) by mouth daily at bedtime as needed (as need for sleep) 30 tablet 0    metoprolol tartrate (LOPRESSOR) 25 mg tablet Take 1 tablet (25 mg total) by mouth every 12 (twelve) hours 60 tablet 5    RABEprazole (ACIPHEX) 20 MG tablet Take 20 mg by mouth 2 (two) times a day      topiramate (TOPAMAX) 100 mg tablet Take 1 tablet (100 mg total) by mouth 2 (two) times a day (Patient taking differently: Take 150 mg by mouth 2 (two) times a day) 60 tablet 0     No current facility-administered medications for this visit  Allergies   Allergen Reactions    Percocet [Oxycodone-Acetaminophen] Headache     Severe headaches   (denies issues with Tylenol)    Povidone Iodine Rash     Unsure if betadine or gauze post surgical  Got rash on leg  Has  Had itchy rashes after every surgery prep and IV insertion    Chlorhexidine Rash      Immunizations:     Immunization History   Administered Date(s) Administered    COVID-19 PFIZER VACCINE 0 3 ML IM 03/31/2021, 04/21/2021, 12/13/2021, 05/09/2022    INFLUENZA 08/07/2014, 07/29/2015, 10/10/2018    Influenza Split 07/29/2015    Influenza, recombinant, quadrivalent,injectable, preservative free 11/27/2018, 09/20/2021    Tdap 01/26/2009, 07/23/2018    Tuberculin Skin Test-PPD Intradermal 12/10/2018, 01/26/2022      Health Maintenance:         Topic Date Due    Lung Cancer Screening  Never done    Cervical Cancer Screening  06/10/2022    Colorectal Cancer Screening  09/03/2030    HIV Screening  Completed    Hepatitis C Screening  Completed         Topic Date Due    Influenza Vaccine (1) 09/01/2022      Medicare Screening Tests and Risk Assessments:     Vaishnavi Greene is here for her Subsequent Wellness visit  Health Risk Assessment:   Patient rates overall health as fair  Patient feels that their physical health rating is same  Patient is dissatisfied with their life  Eyesight was rated as slightly worse  Hearing was rated as same   Patient feels that their emotional and mental health rating is slightly worse  Patients states they are often angry  Patient states they are often unusually tired/fatigued  Pain experienced in the last 7 days has been a lot  Patient's pain rating has been 6/10  Patient states that she has experienced weight loss or gain in last 6 months  Depression Screening:   PHQ-9 Score: 3      Fall Risk Screening: In the past year, patient has experienced: no history of falling in past year      Urinary Incontinence Screening:   Patient has leaked urine accidently in the last six months  Home Safety:  Patient has trouble with stairs inside or outside of their home  Patient has working smoke alarms and has working carbon monoxide detector  Home safety hazards include: none  Nutrition:   Current diet is Unhealthy  Medications:   Patient is not currently taking any over-the-counter supplements  Patient is able to manage medications  Activities of Daily Living (ADLs)/Instrumental Activities of Daily Living (IADLs):   Walk and transfer into and out of bed and chair?: Yes  Dress and groom yourself?: Yes    Bathe or shower yourself?: Yes    Feed yourself?  Yes  Do your laundry/housekeeping?: Yes  Manage your money, pay your bills and track your expenses?: Yes  Make your own meals?: Yes    Do your own shopping?: Yes    Previous Hospitalizations:   Any hospitalizations or ED visits within the last 12 months?: Yes    How many hospitalizations have you had in the last year?: 1-2    Advance Care Planning:   Living will: No    Durable POA for healthcare: No    Advanced directive: Yes      PREVENTIVE SCREENINGS      Cardiovascular Screening:    General: Screening Not Indicated and History Lipid Disorder      Diabetes Screening:     General: Screening Current      Colorectal Cancer Screening:     General: Screening Current      Breast Cancer Screening:     General: Screening Current      Cervical Cancer Screening:    General: Screening Current      Lung Cancer Screening:     General: Screening Not Indicated      Hepatitis C Screening:    General: Screening Current    Screening, Brief Intervention, and Referral to Treatment (SBIRT)    Screening  Typical number of drinks in a day: 0  Typical number of drinks in a week: 0  Interpretation: Low risk drinking behavior  AUDIT-C Screenin) How often did you have a drink containing alcohol in the past year? never  2) How many drinks did you have on a typical day when you were drinking in the past year? 0  3) How often did you have 6 or more drinks on one occasion in the past year? never    AUDIT-C Score: 0  Interpretation: Score 0-2 (female): Negative screen for alcohol misuse    Single Item Drug Screening:  How often have you used an illegal drug (including marijuana) or a prescription medication for non-medical reasons in the past year? monthly    Single Item Drug Screen Score: 2  Interpretation: POSITIVE screen for possible drug use disorder    Drug Abuse Screening Test (DAST-10):  1) Have you used drugs other than those required for medical reasons? Yes  2) Do you abuse more than one drug at a time? Yes  3) Are you always able to stop using drugs when you want to? Yes  4) Have you had "blackouts" or "flashbacks" as a result of drug use? No  5) Do you ever feel bad or guilty about your drug use? No  6) Does your spouse (or parents) ever complain about your involvement with drugs? No  7) Have you neglected your family because of your use of drugs? No  8) Have you engaged in illegal activities in order to obtain drugs? No  9) Have you ever experienced withdrawal symptoms (felt sick) when you stopped taking drugs? No  10) Have you had medical problems as a result of your drug use (e g , memory loss, hepatitis, convulsions, bleeding, etc )?  No    DAST-10 Score: 2  Interpretation: Low level problems related to drug abuse    Other Counseling Topics:   Car/seat belt/driving safety, skin self-exam, sunscreen and calcium and vitamin D intake and regular weightbearing exercise  No exam data present     Physical Exam:     /78 (BP Location: Left arm, Patient Position: Sitting, Cuff Size: Large)   Pulse 83   Temp 98 1 °F (36 7 °C) (Temporal)   Resp 18   Ht 5' 6" (1 676 m)   Wt 133 kg (293 lb 12 8 oz)   SpO2 97%   BMI 47 42 kg/m²     Physical Exam  Vitals reviewed  Constitutional:       General: She is in acute distress  Musculoskeletal:      Right shoulder: Tenderness present  Decreased range of motion  Normal strength  Left shoulder: Normal         Arms:       Comments: Difficult to bring arm behind back  Can not raise right arm higher than shoulder   is good   Neurological:      Mental Status: She is alert            Cristine Madrigal

## 2022-08-10 NOTE — PROGRESS NOTES
RAIN HERNANDEZ spoke with patient at this time-she reports she has been in touch with [address] [place] [address], however, she is still unclear of who her new  is  RAIN HERNANDEZ explained that she would be able to assist with finding alternative mental health services, however, they are not as extensive and do not include case management services, etc  It would only be for therapy/psychiatry  Patient states she had also been in contact with [facility]  who states there are not many other options of places who offer similar services but did offer to help her transition elsewhere if she wanted  Patient states she likes the Doctor at [address] [place] [address] and was not currently interested in switching  Patient states she is supposed to have a  from [address] [place] [address] come to her home today  RAIN HERNANDEZ encouraged patient to reconsider switching agencies if they do not show up today, as scheduled  Patient will continue to keep RAIN HERNANDEZ informed  Denies having any questions or concerns for RAIN HERNANDEZ to assist with at this time

## 2022-08-11 ENCOUNTER — HOSPITAL ENCOUNTER (OUTPATIENT)
Dept: PULMONOLOGY | Facility: HOSPITAL | Age: 52
Discharge: HOME | End: 2022-08-11
Payer: MEDICARE

## 2022-08-11 DIAGNOSIS — Z87.891 HISTORY OF TOBACCO USE: ICD-10-CM

## 2022-08-11 PROCEDURE — 94729 DIFFUSING CAPACITY: CPT | Performed by: INTERNAL MEDICINE

## 2022-08-11 PROCEDURE — 94060 EVALUATION OF WHEEZING: CPT

## 2022-08-11 PROCEDURE — 94726 PLETHYSMOGRAPHY LUNG VOLUMES: CPT

## 2022-08-11 PROCEDURE — 94060 EVALUATION OF WHEEZING: CPT | Performed by: INTERNAL MEDICINE

## 2022-08-11 PROCEDURE — 94729 DIFFUSING CAPACITY: CPT

## 2022-08-11 PROCEDURE — 94726 PLETHYSMOGRAPHY LUNG VOLUMES: CPT | Performed by: INTERNAL MEDICINE

## 2022-08-11 PROCEDURE — 94760 N-INVAS EAR/PLS OXIMETRY 1: CPT

## 2022-08-11 RX ORDER — ALBUTEROL SULFATE 2.5 MG/3ML
2.5 SOLUTION RESPIRATORY (INHALATION) ONCE
Status: COMPLETED | OUTPATIENT
Start: 2022-08-11 | End: 2022-08-11

## 2022-08-11 RX ADMIN — ALBUTEROL SULFATE 2.5 MG: 2.5 SOLUTION RESPIRATORY (INHALATION) at 15:16

## 2022-08-23 ENCOUNTER — OFFICE VISIT (OUTPATIENT)
Dept: OBGYN CLINIC | Facility: MEDICAL CENTER | Age: 52
End: 2022-08-23
Payer: MEDICARE

## 2022-08-23 VITALS
HEART RATE: 76 BPM | BODY MASS INDEX: 46.61 KG/M2 | SYSTOLIC BLOOD PRESSURE: 106 MMHG | DIASTOLIC BLOOD PRESSURE: 74 MMHG | HEIGHT: 66 IN | WEIGHT: 290 LBS

## 2022-08-23 DIAGNOSIS — M75.31 CALCIFIC TENDINITIS OF RIGHT SHOULDER: ICD-10-CM

## 2022-08-23 DIAGNOSIS — M19.011 PRIMARY OSTEOARTHRITIS OF RIGHT SHOULDER: ICD-10-CM

## 2022-08-23 DIAGNOSIS — M54.2 NECK PAIN ON RIGHT SIDE: ICD-10-CM

## 2022-08-23 DIAGNOSIS — S29.011A STRAIN OF RIGHT PECTORALIS MUSCLE, INITIAL ENCOUNTER: ICD-10-CM

## 2022-08-23 DIAGNOSIS — G89.29 CHRONIC RIGHT SHOULDER PAIN: Primary | ICD-10-CM

## 2022-08-23 DIAGNOSIS — M25.511 CHRONIC RIGHT SHOULDER PAIN: Primary | ICD-10-CM

## 2022-08-23 PROCEDURE — 99204 OFFICE O/P NEW MOD 45 MIN: CPT | Performed by: EMERGENCY MEDICINE

## 2022-08-23 PROCEDURE — 20610 DRAIN/INJ JOINT/BURSA W/O US: CPT | Performed by: EMERGENCY MEDICINE

## 2022-08-23 RX ORDER — LIDOCAINE HYDROCHLORIDE 10 MG/ML
4 INJECTION, SOLUTION INFILTRATION; PERINEURAL
Status: COMPLETED | OUTPATIENT
Start: 2022-08-23 | End: 2022-08-23

## 2022-08-23 RX ORDER — TRIAMCINOLONE ACETONIDE 40 MG/ML
40 INJECTION, SUSPENSION INTRA-ARTICULAR; INTRAMUSCULAR
Status: COMPLETED | OUTPATIENT
Start: 2022-08-23 | End: 2022-08-23

## 2022-08-23 RX ADMIN — LIDOCAINE HYDROCHLORIDE 4 ML: 10 INJECTION, SOLUTION INFILTRATION; PERINEURAL at 15:51

## 2022-08-23 RX ADMIN — TRIAMCINOLONE ACETONIDE 40 MG: 40 INJECTION, SUSPENSION INTRA-ARTICULAR; INTRAMUSCULAR at 15:51

## 2022-08-23 NOTE — LETTER
August 23, 2022     Patient: Matteo Moses  YOB: 1970  Date of Visit: 8/23/2022      To Whom it May Concern:    Quan Ferrera is under my professional care  Lashay Vang was seen in my office on 8/23/2022  If you have any questions or concerns, please don't hesitate to call  Sincerely,          Emerson Gonzalez MD        CC: Tony Duverney     [address]
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
## 2022-08-31 ENCOUNTER — EVALUATION (OUTPATIENT)
Dept: PHYSICAL THERAPY | Facility: CLINIC | Age: 52
End: 2022-08-31
Payer: MEDICARE

## 2022-08-31 DIAGNOSIS — M75.31 CALCIFIC TENDINITIS OF RIGHT SHOULDER: ICD-10-CM

## 2022-08-31 DIAGNOSIS — M19.011 PRIMARY OSTEOARTHRITIS OF RIGHT SHOULDER: ICD-10-CM

## 2022-08-31 DIAGNOSIS — G89.29 CHRONIC RIGHT SHOULDER PAIN: Primary | ICD-10-CM

## 2022-08-31 DIAGNOSIS — S29.011A STRAIN OF RIGHT PECTORALIS MUSCLE, INITIAL ENCOUNTER: ICD-10-CM

## 2022-08-31 DIAGNOSIS — M25.511 CHRONIC RIGHT SHOULDER PAIN: Primary | ICD-10-CM

## 2022-08-31 PROCEDURE — 97530 THERAPEUTIC ACTIVITIES: CPT | Performed by: PHYSICAL THERAPIST

## 2022-08-31 PROCEDURE — 97162 PT EVAL MOD COMPLEX 30 MIN: CPT | Performed by: PHYSICAL THERAPIST

## 2022-09-06 ENCOUNTER — ANESTHESIA EVENT (OUTPATIENT)
Dept: PERIOP | Facility: AMBULARY SURGERY CENTER | Age: 52
End: 2022-09-06
Payer: MEDICARE

## 2022-09-08 ENCOUNTER — OFFICE VISIT (OUTPATIENT)
Dept: PHYSICAL THERAPY | Facility: CLINIC | Age: 52
End: 2022-09-08
Payer: MEDICARE

## 2022-09-08 ENCOUNTER — OFFICE VISIT (OUTPATIENT)
Dept: FAMILY MEDICINE CLINIC | Facility: CLINIC | Age: 52
End: 2022-09-08
Payer: MEDICARE

## 2022-09-08 VITALS
RESPIRATION RATE: 18 BRPM | SYSTOLIC BLOOD PRESSURE: 116 MMHG | HEART RATE: 63 BPM | HEIGHT: 66 IN | WEIGHT: 291.8 LBS | OXYGEN SATURATION: 97 % | BODY MASS INDEX: 46.9 KG/M2 | DIASTOLIC BLOOD PRESSURE: 74 MMHG | TEMPERATURE: 97.5 F

## 2022-09-08 DIAGNOSIS — S29.011A STRAIN OF RIGHT PECTORALIS MUSCLE, INITIAL ENCOUNTER: ICD-10-CM

## 2022-09-08 DIAGNOSIS — R35.0 URINARY FREQUENCY: ICD-10-CM

## 2022-09-08 DIAGNOSIS — G89.29 CHRONIC RIGHT SHOULDER PAIN: Primary | ICD-10-CM

## 2022-09-08 DIAGNOSIS — F17.200 TOBACCO DEPENDENCE: ICD-10-CM

## 2022-09-08 DIAGNOSIS — M25.511 CHRONIC RIGHT SHOULDER PAIN: Primary | ICD-10-CM

## 2022-09-08 DIAGNOSIS — F17.219 NICOTINE DEPENDENCE, CIGARETTES, WITH UNSPECIFIED NICOTINE-INDUCED DISORDERS: ICD-10-CM

## 2022-09-08 DIAGNOSIS — M19.011 PRIMARY OSTEOARTHRITIS OF RIGHT SHOULDER: ICD-10-CM

## 2022-09-08 DIAGNOSIS — M75.31 CALCIFIC TENDINITIS OF RIGHT SHOULDER: ICD-10-CM

## 2022-09-08 DIAGNOSIS — N95.1 VASOMOTOR SYMPTOMS DUE TO MENOPAUSE: ICD-10-CM

## 2022-09-08 DIAGNOSIS — Z59.9 FINANCIAL DIFFICULTIES: ICD-10-CM

## 2022-09-08 DIAGNOSIS — E66.01 MORBID OBESITY WITH BMI OF 45.0-49.9, ADULT (HCC): ICD-10-CM

## 2022-09-08 DIAGNOSIS — Z00.00 MEDICARE ANNUAL WELLNESS VISIT, INITIAL: Primary | ICD-10-CM

## 2022-09-08 LAB
SL AMB  POCT GLUCOSE, UA: NEGATIVE
SL AMB LEUKOCYTE ESTERASE,UA: NEGATIVE
SL AMB POCT BILIRUBIN,UA: NEGATIVE
SL AMB POCT BLOOD,UA: NEGATIVE
SL AMB POCT CLARITY,UA: NORMAL
SL AMB POCT COLOR,UA: YELLOW
SL AMB POCT KETONES,UA: NEGATIVE
SL AMB POCT NITRITE,UA: NEGATIVE
SL AMB POCT PH,UA: 8.5
SL AMB POCT SPECIFIC GRAVITY,UA: 1
SL AMB POCT URINE PROTEIN: NORMAL
SL AMB POCT UROBILINOGEN: NEGATIVE

## 2022-09-08 PROCEDURE — 97140 MANUAL THERAPY 1/> REGIONS: CPT

## 2022-09-08 PROCEDURE — 99213 OFFICE O/P EST LOW 20 MIN: CPT | Performed by: NURSE PRACTITIONER

## 2022-09-08 PROCEDURE — 81002 URINALYSIS NONAUTO W/O SCOPE: CPT | Performed by: NURSE PRACTITIONER

## 2022-09-08 PROCEDURE — 97112 NEUROMUSCULAR REEDUCATION: CPT

## 2022-09-08 PROCEDURE — G0439 PPPS, SUBSEQ VISIT: HCPCS | Performed by: NURSE PRACTITIONER

## 2022-09-08 PROCEDURE — 97530 THERAPEUTIC ACTIVITIES: CPT

## 2022-09-08 SDOH — ECONOMIC STABILITY - INCOME SECURITY: PROBLEM RELATED TO HOUSING AND ECONOMIC CIRCUMSTANCES, UNSPECIFIED: Z59.9

## 2022-09-08 NOTE — PROGRESS NOTES
Assessment and Plan:     Problem List Items Addressed This Visit    None     Visit Diagnoses     Medicare annual wellness visit, initial    -  Primary    Tobacco dependence        Relevant Orders    CT lung screening program    Financial difficulties        She already has a  who is helping her  Morbid obesity with BMI of 45 0-49 9, adult (HCC)        Urinary frequency        urine test negative  discussed frequent urination    already on medication  Will think about pelvic PT     Relevant Orders    POCT urine dip (Completed)    Nicotine dependence, cigarettes, with unspecified nicotine-induced disorders         Relevant Orders    CT lung screening program           Preventive health issues were discussed with patient, and age appropriate screening tests were ordered as noted in patient's After Visit Summary  Personalized health advice and appropriate referrals for health education or preventive services given if needed, as noted in patient's After Visit Summary  History of Present Illness:     Patient presents for a Medicare Wellness Visit    Having urgency    Not drinking any more than normal   NO pain or burning    During day at work doing ok but then when comes home has urgency and incontinence      Patient Care Team:  Harshal Dominguez as PCP - General (Nurse Practitioner)  Purnima Samaniego MD as PCP - [address] [place], [place] [address]Th [facility])  Tin Day MD (Endocrinology)  Sahara Quispe as  Care Manager ([facility])  Jono Patterson MD (Orthopedic Surgery)  Jamila Davis DO (Obstetrics and Gynecology)     Review of Systems:     Review of Systems   Constitutional: Negative for activity change, appetite change, chills, fatigue and fever  HENT: Negative for congestion, ear pain, rhinorrhea and sore throat  Eyes: Negative for pain and visual disturbance  Respiratory: Negative for cough and shortness of breath      Cardiovascular: Negative for chest pain and palpitations  Gastrointestinal: Negative for abdominal pain, constipation, diarrhea, nausea and vomiting  Genitourinary: Positive for frequency and urgency  Negative for dysuria and hematuria  Musculoskeletal: Negative for arthralgias and back pain  Skin: Negative for color change and rash  Neurological: Negative for dizziness, seizures, syncope, light-headedness, numbness and headaches  Psychiatric/Behavioral: The patient is nervous/anxious  All other systems reviewed and are negative         Problem List:     Patient Active Problem List   Diagnosis    Yan's esophagus determined by biopsy    Benign essential hypertension    Schizoaffective disorder, bipolar type ([place] [address] )    Chronic low back pain    DDD (degenerative disc disease), lumbar    Edema    Family history of renal cancer    Hypothyroidism    Microhematuria    MAG (obstructive sleep apnea)    Spondylosis of lumbosacral joint without myelopathy    Overactive bladder    Primary osteoarthritis of both knees    Major depressive disorder    Sjogren's syndrome ([facility] )    Marijuana abuse    COPD (chronic obstructive pulmonary disease) ([place] [address] )    Mixed hyperlipidemia    Class 3 severe obesity due to excess calories with serious comorbidity and body mass index (BMI) of 40 0 to 44 9 in adult [facility])    Memory difficulties    History of COVID-19    Morbid obesity with BMI of 40 0-44 9, adult ([place] [address] )    Medical clearance for psychiatric admission    Hemorrhage of rectum and anus    Medial epicondylitis of left elbow    Restrictive lung disease    Chronic tension-type headache, intractable    Potential for cognitive impairment    Mood disorder (HCC)    Substance abuse ([facility] )    Cognitive impairment    Contusion of elbow    GERD (gastroesophageal reflux disease)    Diarrhea    Ecchymosis    Anxiety    [facility] )    Platelets decreased ([place] [address] )    Knee pain, right    Suicidal deliberate poisoning ([place] [address] ) Past Medical and Surgical History:     Past Medical History:   Diagnosis Date    Anxiety     Arthritis     oa cassandra knees    Asthma     good control- no medications    Yan's esophagus     Bipolar affective disorder (HCC)     Chronic pain disorder     lumbar    CPAP (continuous positive airway pressure) dependence     Degenerative disc disease at L5-S1 level     Deliberate self-cutting     Depression 09/16/2008    Disease of thyroid gland     hypo    MARTINEZ (dyspnea on exertion)     Drug overdose 10/28/2008    suicide attempt    Dysphagia     Dyspnea     Edema     BLE    GERD (gastroesophageal reflux disease)     High blood pressure     History of COVID-19 12/30/2020    Symptoms started on 12/30/2020 and then got worse  Today she is feeling a little bit better  She is a candidate for monoclonal antibody infusion and set for 1/6/21 @ 1pm at [facility]  01/07/21 - telemedicine -     Hypertension     Knee pain, bilateral     Especially right    Migraines     Obese     Obesity     Overactive bladder     Sjogren's disease ([address] )     Sleep apnea     Stress incontinence     Suicidal ideations     Umbilical hernia     surgical repair today 4/24/2019    Use of cane as ambulatory aid     awaiting b/l knee replacement    Wears glasses      Past Surgical History:   Procedure Laterality Date    BREAST CYST EXCISION Right 1989    CARPAL TUNNEL RELEASE Left     CHOLECYSTECTOMY  05/2003    Laparoscopic    COLONOSCOPY      01/12/2009    DILATION AND CURETTAGE OF UTERUS      ELBOW SURGERY Left     x2   No hardware    ESOPHAGOGASTRODUODENOSCOPY      FOOT SURGERY Left     Plantar fasciotomy    HYSTERECTOMY      laporoscopic, partial    KNEE ARTHROSCOPY Bilateral     OOPHORECTOMY Left 10/2015    CO ANAL SPHINCTEROTOMY N/A 08/31/2018    Procedure: EUA, LEFT PARTIAL INTERNAL SPHINCTEROTOMY;  Surgeon: Jeannine Mcdaniel MD;  Location: [facility] MAIN OR;  Service: Colorectal    CO GASTROCNEMIUS RECESSION Left 2021    Procedure: RECESSION GASTROC OPEN;  Surgeon: David Araujo DPM;  Location: [address] [place] [address] MAIN OR;  Service: [facility] JODO,0+G/L,CBILY N/A 2019    Procedure: REPAIR HERNIA UMBILICAL LAPAROSCOPIC W/ ROBOTICS;  Surgeon: Castro Sadler MD;  Location: [facility] Main OR;  Service: General    REDUCTION MAMMAPLASTY Bilateral 1999    REPAIR LACERATION Left     left hand-2009    REPLACEMENT TOTAL KNEE Right     ROTATOR CUFF REPAIR Left     TONSILECTOMY AND ADNOIDECTOMY     [facility] PROCEDURE  2021    VEIN LIGATION Bilateral     WISDOM TOOTH EXTRACTION        Family History:     Family History   Problem Relation Age of Onset    Kidney cancer Mother     Diabetes Mother     No Known Problems Father     No Known Problems Sister     No Known Problems Maternal Grandmother     No Known Problems Maternal Grandfather     No Known Problems Paternal Grandmother     No Known Problems Paternal Grandfather     Colon cancer Maternal Uncle     Colon cancer Maternal Uncle     Colon cancer Paternal Uncle     Colon cancer Family       Social History:     Social History     Socioeconomic History    Marital status: Single     Spouse name: None    Number of children: None    Years of education: None    Highest education level: None   Occupational History    None   Tobacco Use    Smoking status: Former Smoker     Packs/day: 2 00     Years: 33 00     Pack years: 66 00     Types: Cigarettes     Start date: 5     Quit date: 2018     Years since quittin 6    Smokeless tobacco: Never Used    Tobacco comment: Started at age 14     Vaping Use    Vaping Use: Some days    Substances: THC   Substance and Sexual Activity    Alcohol use: Yes     Comment: Recovering alcoholic    Drug use: Yes     Types: Marijuana, Methamphetamines     Comment: medical- vapes 3 times per wk at     Sexual activity: Not Currently   Other Topics Concern  None   Social History Narrative    None     Social Determinants of Health     Financial Resource Strain: High Risk    Difficulty of Paying Living Expenses: Hard   Food Insecurity: Not on file   Transportation Needs: No Transportation Needs    Lack of Transportation (Medical): No    Lack of Transportation (Non-Medical): No   Physical Activity: Not on file   Stress: Not on file   Social Connections: Not on file   Intimate Partner Violence: Not on file   Housing Stability: Not on file      Medications and Allergies:     Current Outpatient Medications   Medication Sig Dispense Refill    albuterol (Ventolin HFA) 90 mcg/act inhaler Inhale 2 puffs every 6 (six) hours as needed for wheezing 18 g 5    amLODIPine (NORVASC) 5 mg tablet TAKE 1 TABLET (5 MG TOTAL) BY MOUTH DAILY 30 tablet 1    buPROPion (WELLBUTRIN XL) 150 mg 24 hr tablet Take 1 tablet (150 mg total) by mouth daily 30 tablet 0    Cholecalciferol (Vitamin D3) 125 MCG (5000 UT) CAPS Take by mouth in the morning      colestipol (COLESTID) 1 g tablet Take 1 tablet (1 g total) by mouth 2 (two) times a day 60 tablet 0    divalproex sodium (DEPAKOTE) 250 mg EC tablet Take 3 tablets (750 mg total) by mouth every 12 (twelve) hours 180 tablet 0    estradiol (ESTRACE) 0 5 MG tablet TAKE 1 TABLET (0 5 MG TOTAL) BY MOUTH DAILY 30 tablet 1    Fesoterodine Fumarate ER (Toviaz) 4 MG TB24 Take 1 tablet by mouth daily      fluticasone (FLOVENT HFA) 110 MCG/ACT inhaler Inhale 2 puffs 2 (two) times a day Rinse mouth after use        furosemide (LASIX) 20 mg tablet TAKE 1 TABLET BY MOUTH DAILY 30 tablet 2    haloperidol decanoate (Haldol Decanoate) 50 mg/mL injection       hydrOXYzine HCL (ATARAX) 25 mg tablet Take 1 tablet (25 mg total) by mouth every 6 (six) hours as needed for itching (mild anxiety) 60 tablet 0    levothyroxine 100 mcg tablet Take 1 tablet (100 mcg total) by mouth daily in the early morning 90 tablet 1    losartan (COZAAR) 50 mg tablet TAKE ONE TABLET BY MOUTH DAILY 30 tablet 10    lurasidone 60 MG TABS Take 1 tablet (60 mg total) by mouth daily with breakfast (Patient taking differently: Take 60 mg by mouth daily with breakfast TAKEN IN THE MORNING AND AT NIGHT) 30 tablet 0    metoprolol tartrate (LOPRESSOR) 25 mg tablet Take 1 tablet (25 mg total) by mouth every 12 (twelve) hours 60 tablet 5    pilocarpine (SALAGEN) 5 mg tablet Take 5 mg by mouth 2 (two) times a day      RABEprazole (ACIPHEX) 20 MG tablet Take 20 mg by mouth 2 (two) times a day      rOPINIRole (REQUIP) 0 5 mg tablet Take by mouth daily at bedtime      topiramate (TOPAMAX) 100 mg tablet Take 1 tablet (100 mg total) by mouth 2 (two) times a day (Patient taking differently: Take 150 mg by mouth 2 (two) times a day) 60 tablet 0    aspirin (ECOTRIN LOW STRENGTH) 81 mg EC tablet Take 81 mg by mouth daily      gabapentin (Neurontin) 300 mg capsule Take 1 capsule (300 mg total) by mouth 3 (three) times a day 90 capsule 0     No current facility-administered medications for this visit  Allergies   Allergen Reactions    Percocet [Oxycodone-Acetaminophen] Headache     Severe headaches   (denies issues with Tylenol)    Povidone Iodine Rash     Unsure if betadine or gauze post surgical  Got rash on leg     Has  Had itchy rashes after every surgery prep and IV insertion    Chlorhexidine Rash      Immunizations:     Immunization History   Administered Date(s) Administered    COVID-19 PFIZER VACCINE 0 3 ML IM 03/31/2021, 04/21/2021, 12/13/2021, 05/09/2022    INFLUENZA 08/07/2014, 07/29/2015, 10/10/2018    Influenza Split 07/29/2015    Influenza, recombinant, quadrivalent,injectable, preservative free 11/27/2018, 09/20/2021    Tdap 01/26/2009, 07/23/2018    Tuberculin Skin Test-PPD Intradermal 12/10/2018, 01/26/2022      Health Maintenance:         Topic Date Due    Lung Cancer Screening  Never done    Cervical Cancer Screening  06/10/2022    Colorectal Cancer Screening 09/03/2030    HIV Screening  Completed    Hepatitis C Screening  Completed         Topic Date Due    Influenza Vaccine (1) 09/01/2022      Medicare Screening Tests and Risk Assessments:     Rafia Car is here for her Initial Wellness visit  Health Risk Assessment:   Patient rates overall health as good  Patient feels that their physical health rating is same  Patient is dissatisfied with their life  Eyesight was rated as same  Hearing was rated as same  Patient feels that their emotional and mental health rating is same  Patients states they are often angry  Patient states they are often unusually tired/fatigued  Pain experienced in the last 7 days has been none  Patient states that she has experienced no weight loss or gain in last 6 months  Fall Risk Screening: In the past year, patient has experienced: no history of falling in past year      Urinary Incontinence Screening:   Patient has leaked urine accidently in the last six months  Home Safety:  Patient does not have trouble with stairs inside or outside of their home  Patient has working smoke alarms and has working carbon monoxide detector  Home safety hazards include: none  Nutrition:   Current diet is Regular  Medications:   Patient is currently taking over-the-counter supplements  OTC medications include: see medication list  Patient is able to manage medications  Activities of Daily Living (ADLs)/Instrumental Activities of Daily Living (IADLs):   Walk and transfer into and out of bed and chair?: Yes  Dress and groom yourself?: Yes    Bathe or shower yourself?: Yes    Feed yourself?  Yes  Do your laundry/housekeeping?: Yes  Manage your money, pay your bills and track your expenses?: Yes  Make your own meals?: Yes    Do your own shopping?: Yes    Previous Hospitalizations:   Any hospitalizations or ED visits within the last 12 months?: No      Advance Care Planning:   Living will: Yes    Advanced directive: Yes      PREVENTIVE SCREENINGS      Cardiovascular Screening:    General: Screening Not Indicated and History Lipid Disorder      Diabetes Screening:     General: Screening Current      Colorectal Cancer Screening:     General: Screening Current      Breast Cancer Screening:     General: Screening Current      Cervical Cancer Screening:    General: Screening Current      Osteoporosis Screening:    General: Screening Not Indicated      Abdominal Aortic Aneurysm (AAA) Screening:        General: Screening Not Indicated      Lung Cancer Screening:     General: Risks and Benefits Discussed    Due for: Low Dose CT (LDCT)      Hepatitis C Screening:    General: Screening Current    Screening, Brief Intervention, and Referral to Treatment (SBIRT)    Screening  Typical number of drinks in a day: 0  Typical number of drinks in a week: 0  Interpretation: Low risk drinking behavior  AUDIT-C Screenin) How often did you have a drink containing alcohol in the past year? never  2) How many drinks did you have on a typical day when you were drinking in the past year? 0  3) How often did you have 6 or more drinks on one occasion in the past year? never    AUDIT-C Score: 0  Interpretation: Score 0-2 (female): Negative screen for alcohol misuse    Single Item Drug Screening:  How often have you used an illegal drug (including marijuana) or a prescription medication for non-medical reasons in the past year? never    Single Item Drug Screen Score: 0  Interpretation: Negative screen for possible drug use disorder    Other Counseling Topics:   Car/seat belt/driving safety, skin self-exam, sunscreen and calcium and vitamin D intake and regular weightbearing exercise       No exam data present     Physical Exam:     /74 (BP Location: Left arm, Patient Position: Sitting, Cuff Size: Large)   Pulse 63   Temp 97 5 °F (36 4 °C) (Temporal)   Resp 18   Ht 5' 5 75" (1 67 m)   Wt 132 kg (291 lb 12 8 oz)   SpO2 97%   BMI 47 46 kg/m²     Physical Exam  Vitals reviewed  Constitutional:       Appearance: Normal appearance  She is obese  Cardiovascular:      Rate and Rhythm: Normal rate and regular rhythm  Pulmonary:      Effort: Pulmonary effort is normal       Breath sounds: Normal breath sounds  Abdominal:      General: Bowel sounds are normal  There is no distension  Palpations: Abdomen is soft  Tenderness: There is no abdominal tenderness  Skin:     General: Skin is warm  Neurological:      General: No focal deficit present  Mental Status: She is alert and oriented to person, place, and time  Psychiatric:         Mood and Affect: Mood normal          Behavior: Behavior normal           JHONATAN May BMI Counseling: Body mass index is 47 46 kg/m²  The BMI is above normal  Nutrition recommendations include reducing portion sizes, decreasing overall calorie intake, 3-5 servings of fruits/vegetables daily, reducing fast food intake, consuming healthier snacks, decreasing soda and/or juice intake and moderation in carbohydrate intake  Exercise recommendations include exercising 3-5 times per week

## 2022-09-08 NOTE — PATIENT INSTRUCTIONS
Medicare Preventive Visit Patient Instructions  Thank you for completing your Welcome to Medicare Visit or Medicare Annual Wellness Visit today  Your next wellness visit will be due in one year (9/9/2023)  The screening/preventive services that you may require over the next 5-10 years are detailed below  Some tests may not apply to you based off risk factors and/or age  Screening tests ordered at today's visit but not completed yet may show as past due  Also, please note that scanned in results may not display below  Preventive Screenings:  Service Recommendations Previous Testing/Comments   Colorectal Cancer Screening  * Colonoscopy    * Fecal Occult Blood Test (FOBT)/Fecal Immunochemical Test (FIT)  * Fecal DNA/Cologuard Test  * Flexible Sigmoidoscopy Age: 39-70 years old   Colonoscopy: every 10 years (may be performed more frequently if at higher risk)  OR  FOBT/FIT: every 1 year  OR  Cologuard: every 3 years  OR  Sigmoidoscopy: every 5 years  Screening may be recommended earlier than age 39 if at higher risk for colorectal cancer  Also, an individualized decision between you and your healthcare provider will decide whether screening between the ages of 74-80 would be appropriate  Colonoscopy: 09/03/2020  FOBT/FIT: Not on file  Cologuard: Not on file  Sigmoidoscopy: Not on file    Screening Current     Breast Cancer Screening Age: 36 years old  Frequency: every 1-2 years  Not required if history of left and right mastectomy Mammogram: 05/31/2022    Screening Current   Cervical Cancer Screening Between the ages of 21-29, pap smear recommended once every 3 years  Between the ages of 33-67, can perform pap smear with HPV co-testing every 5 years     Recommendations may differ for women with a history of total hysterectomy, cervical cancer, or abnormal pap smears in past  Pap Smear: 06/10/2021    Screening Current   Hepatitis C Screening Once for adults born between 1945 and 1965  More frequently in patients at high risk for Hepatitis C Hep C Antibody: 07/10/2019    Screening Current   Diabetes Screening 1-2 times per year if you're at risk for diabetes or have pre-diabetes Fasting glucose: 104 mg/dL (7/17/2022)  A1C: 5 2 % (2/22/2022)  Screening Current   Cholesterol Screening Once every 5 years if you don't have a lipid disorder  May order more often based on risk factors  Lipid panel: 07/17/2022    Screening Not Indicated  History Lipid Disorder     Other Preventive Screenings Covered by Medicare:  1  Abdominal Aortic Aneurysm (AAA) Screening: covered once if your at risk  You're considered to be at risk if you have a family history of AAA  2  Lung Cancer Screening: covers low dose CT scan once per year if you meet all of the following conditions: (1) Age 50-69; (2) No signs or symptoms of lung cancer; (3) Current smoker or have quit smoking within the last 15 years; (4) You have a tobacco smoking history of at least 20 pack years (packs per day multiplied by number of years you smoked); (5) You get a written order from a healthcare provider  3  Glaucoma Screening: covered annually if you're considered high risk: (1) You have diabetes OR (2) Family history of glaucoma OR (3)  aged 48 and older OR (3)  American aged 72 and older  3  Osteoporosis Screening: covered every 2 years if you meet one of the following conditions: (1) You're estrogen deficient and at risk for osteoporosis based off medical history and other findings; (2) Have a vertebral abnormality; (3) On glucocorticoid therapy for more than 3 months; (4) Have primary hyperparathyroidism; (5) On osteoporosis medications and need to assess response to drug therapy  · Last bone density test (DXA Scan): Not on file  5  HIV Screening: covered annually if you're between the age of 12-76  Also covered annually if you are younger than 13 and older than 72 with risk factors for HIV infection   For pregnant patients, it is covered up to 3 times per pregnancy  Immunizations:  Immunization Recommendations   Influenza Vaccine Annual influenza vaccination during flu season is recommended for all persons aged >= 6 months who do not have contraindications   Pneumococcal Vaccine   * Pneumococcal conjugate vaccine = PCV13 (Prevnar 13), PCV15 (Vaxneuvance), PCV20 (Prevnar 20)  * Pneumococcal polysaccharide vaccine = PPSV23 (Pneumovax) Adults 25-60 years old: 1-3 doses may be recommended based on certain risk factors  Adults 72 years old: 1-2 doses may be recommended based off what pneumonia vaccine you previously received   Hepatitis B Vaccine 3 dose series if at intermediate or high risk (ex: diabetes, end stage renal disease, liver disease)   Tetanus (Td) Vaccine - COST NOT COVERED BY MEDICARE PART B Following completion of primary series, a booster dose should be given every 10 years to maintain immunity against tetanus  Td may also be given as tetanus wound prophylaxis  Tdap Vaccine - COST NOT COVERED BY MEDICARE PART B Recommended at least once for all adults  For pregnant patients, recommended with each pregnancy  Shingles Vaccine (Shingrix) - COST NOT COVERED BY MEDICARE PART B  2 shot series recommended in those aged 48 and above     Health Maintenance Due:      Topic Date Due    Lung Cancer Screening  Never done    Cervical Cancer Screening  06/10/2022    Colorectal Cancer Screening  09/03/2030    HIV Screening  Completed    Hepatitis C Screening  Completed     Immunizations Due:      Topic Date Due    Influenza Vaccine (1) 09/01/2022     Advance Directives   What are advance directives? Advance directives are legal documents that state your wishes and plans for medical care  These plans are made ahead of time in case you lose your ability to make decisions for yourself  Advance directives can apply to any medical decision, such as the treatments you want, and if you want to donate organs  What are the types of advance directives? There are many types of advance directives, and each state has rules about how to use them  You may choose a combination of any of the following:  · Living will: This is a written record of the treatment you want  You can also choose which treatments you do not want, which to limit, and which to stop at a certain time  This includes surgery, medicine, IV fluid, and tube feedings  · Durable power of  for healthcare [facility]): This is a written record that states who you want to make healthcare choices for you when you are unable to make them for yourself  This person, called a proxy, is usually a family member or a friend  You may choose more than 1 proxy  · Do not resuscitate (DNR) order:  A DNR order is used in case your heart stops beating or you stop breathing  It is a request not to have certain forms of treatment, such as CPR  A DNR order may be included in other types of advance directives  · Medical directive: This covers the care that you want if you are in a coma, near death, or unable to make decisions for yourself  You can list the treatments you want for each condition  Treatment may include pain medicine, surgery, blood transfusions, dialysis, IV or tube feedings, and a ventilator (breathing machine)  · Values history: This document has questions about your views, beliefs, and how you feel and think about life  This information can help others choose the care that you would choose  Why are advance directives important? An advance directive helps you control your care  Although spoken wishes may be used, it is better to have your wishes written down  Spoken wishes can be misunderstood, or not followed  Treatments may be given even if you do not want them  An advance directive may make it easier for your family to make difficult choices about your care  Urinary Incontinence   Urinary incontinence (UI)  is when you lose control of your bladder   UI develops because your bladder cannot store or empty urine properly  The 3 most common types of UI are stress incontinence, urge incontinence, or both  Medicines:   · May be given to help strengthen your bladder control  Report any side effects of medication to your healthcare provider  Do pelvic muscle exercises often:  Your pelvic muscles help you stop urinating  Squeeze these muscles tight for 5 seconds, then relax for 5 seconds  Gradually work up to squeezing for 10 seconds  Do 3 sets of 15 repetitions a day, or as directed  This will help strengthen your pelvic muscles and improve bladder control  Train your bladder:  Go to the bathroom at set times, such as every 2 hours, even if you do not feel the urge to go  You can also try to hold your urine when you feel the urge to go  For example, hold your urine for 5 minutes when you feel the urge to go  As that becomes easier, hold your urine for 10 minutes  Self-care:   · Keep a UI record  Write down how often you leak urine and how much you leak  Make a note of what you were doing when you leaked urine  · Drink liquids as directed  You may need to limit the amount of liquid you drink to help control your urine leakage  Do not drink any liquid right before you go to bed  Limit or do not have drinks that contain caffeine or alcohol  · Prevent constipation  Eat a variety of high-fiber foods  Good examples are high-fiber cereals, beans, vegetables, and whole-grain breads  Walking is the best way to trigger your intestines to have a bowel movement  · Exercise regularly and maintain a healthy weight  Weight loss and exercise will decrease pressure on your bladder and help you control your leakage  · Use a catheter as directed  to help empty your bladder  A catheter is a tiny, plastic tube that is put into your bladder to drain your urine  · Go to behavior therapy as directed  Behavior therapy may be used to help you learn to control your urge to urinate      Weight Management   Why it is important to manage your weight:  Being overweight increases your risk of health conditions such as heart disease, high blood pressure, type 2 diabetes, and certain types of cancer  It can also increase your risk for osteoarthritis, sleep apnea, and other respiratory problems  Aim for a slow, steady weight loss  Even a small amount of weight loss can lower your risk of health problems  How to lose weight safely:  A safe and healthy way to lose weight is to eat fewer calories and get regular exercise  You can lose up about 1 pound a week by decreasing the number of calories you eat by 500 calories each day  Healthy meal plan for weight management:  A healthy meal plan includes a variety of foods, contains fewer calories, and helps you stay healthy  A healthy meal plan includes the following:  · Eat whole-grain foods more often  A healthy meal plan should contain fiber  Fiber is the part of grains, fruits, and vegetables that is not broken down by your body  Whole-grain foods are healthy and provide extra fiber in your diet  Some examples of whole-grain foods are whole-wheat breads and pastas, oatmeal, brown rice, and bulgur  · Eat a variety of vegetables every day  Include dark, leafy greens such as spinach, kale, brandie greens, and mustard greens  Eat yellow and orange vegetables such as carrots, sweet potatoes, and winter squash  · Eat a variety of fruits every day  Choose fresh or canned fruit (canned in its own juice or light syrup) instead of juice  Fruit juice has very little or no fiber  · Eat low-fat dairy foods  Drink fat-free (skim) milk or 1% milk  Eat fat-free yogurt and low-fat cottage cheese  Try low-fat cheeses such as mozzarella and other reduced-fat cheeses  · Choose meat and other protein foods that are low in fat  Choose beans or other legumes such as split peas or lentils  Choose fish, skinless poultry (chicken or turkey), or lean cuts of red meat (beef or pork)   Before you cook meat or poultry, cut off any visible fat  · Use less fat and oil  Try baking foods instead of frying them  Add less fat, such as margarine, sour cream, regular salad dressing and mayonnaise to foods  Eat fewer high-fat foods  Some examples of high-fat foods include french fries, doughnuts, ice cream, and cakes  · Eat fewer sweets  Limit foods and drinks that are high in sugar  This includes candy, cookies, regular soda, and sweetened drinks  Exercise:  Exercise at least 30 minutes per day on most days of the week  Some examples of exercise include walking, biking, dancing, and swimming  You can also fit in more physical activity by taking the stairs instead of the elevator or parking farther away from stores  Ask your healthcare provider about the best exercise plan for you  © Copyright [address] 2018 Information is for End User's use only and may not be sold, redistributed or otherwise used for commercial purposes   All illustrations and images included in CareNotes® are the copyrighted property of [employer]  or [address] [place] [address]

## 2022-09-08 NOTE — PROGRESS NOTES
Daily Note     Today's date: 2022  Patient name: Syd White  : 1970  MRN: [id number]  Referring provider: Mallory Desouza MD  Dx:   Encounter Diagnosis     ICD-10-CM    1  Chronic right shoulder pain  M25 511     G89 29    2  Calcific tendinitis of right shoulder  M75 31    3  Primary osteoarthritis of right shoulder  M19 011    4  Strain of right pectoralis muscle, initial encounter  [id number] [id number]                   Subjective: Pt c/o R pec discomfort today  Objective: See treatment diary below      Assessment: Tolerated treatment well  Patient would benefit from continued PT  No adverse effects noted w/ addition of TB ex  Good tolerance to manual shdr stretching  Cues needed for proper technique w/ cane ER and abduction  Plan: Progress treatment as tolerated         Precautions: COPD    Manuals            Shoulder stretching  NV 10'           Joint mobes  NV NV                                     Neuro Re-Ed             Tb IR  NV RTB x15           Tb ER NV RTB x15           TB rows NV RTB x15           Tb extensions NV RTB x15           Tb horizontal abd  NV NV           Body blade IR/ER                          Ther Ex             UBE             Door pec stretch  NV 20"x3           seated t/s extensions NV NV           Prone Y             Prone T             Prone W             Wall push ups                           DB shoulder flxn  NV           DB scaption  NV           Ther Activity             Cane flexion 10x5" 10x5"           Cane ER 10x5" 10x5"           Cane abd 10x5" 10x5"           Ball flexion/abd Nv 10x5" ea           Pulleys flexion/abd NV 2'/2'                                     Gait Training                                       Modalities

## 2022-09-09 DIAGNOSIS — I10 HYPERTENSION, UNSPECIFIED TYPE: ICD-10-CM

## 2022-09-09 RX ORDER — ESTRADIOL 0.5 MG/1
0.5 TABLET ORAL DAILY
Qty: 30 TABLET | Refills: 2 | Status: SHIPPED | OUTPATIENT
Start: 2022-09-09

## 2022-09-13 ENCOUNTER — OFFICE VISIT (OUTPATIENT)
Dept: OBGYN CLINIC | Facility: MEDICAL CENTER | Age: 52
End: 2022-09-13
Payer: MEDICARE

## 2022-09-13 VITALS
DIASTOLIC BLOOD PRESSURE: 68 MMHG | HEART RATE: 84 BPM | BODY MASS INDEX: 45.64 KG/M2 | SYSTOLIC BLOOD PRESSURE: 137 MMHG | HEIGHT: 66 IN | WEIGHT: 284 LBS

## 2022-09-13 DIAGNOSIS — M75.31 CALCIFIC TENDINITIS OF RIGHT SHOULDER: ICD-10-CM

## 2022-09-13 DIAGNOSIS — M25.511 CHRONIC RIGHT SHOULDER PAIN: ICD-10-CM

## 2022-09-13 DIAGNOSIS — M19.011 PRIMARY OSTEOARTHRITIS OF RIGHT SHOULDER: ICD-10-CM

## 2022-09-13 DIAGNOSIS — S29.011A STRAIN OF RIGHT PECTORALIS MUSCLE, INITIAL ENCOUNTER: Primary | ICD-10-CM

## 2022-09-13 DIAGNOSIS — G89.29 CHRONIC RIGHT SHOULDER PAIN: ICD-10-CM

## 2022-09-13 PROCEDURE — 99214 OFFICE O/P EST MOD 30 MIN: CPT | Performed by: EMERGENCY MEDICINE

## 2022-09-13 RX ORDER — METHYLPREDNISOLONE 4 MG/1
TABLET ORAL
Qty: 1 EACH | Refills: 0 | Status: SHIPPED | OUTPATIENT
Start: 2022-09-13

## 2022-09-13 NOTE — PATIENT INSTRUCTIONS
While taking the oral steroid Medrol Dose Pack, do not take any NSAIDs such as Advil, Motrin, ibuprofen, Motrin, Aleve, naproxen, Celebrex or Meloxicam   You can restart the NSAIDs after you finish the steroids  However you may take Tylenol 500mg every 4-6 hours as needed OR max 1,000mg per dose up to 3 times per day for a total of 3,000mg per day  While taking oral steroids, you may experience mild side effects such as feeling jittery or flushing  Please call if your side effects are significant or you have any questions  You may use Advil (ibuprofen) 600mg every 6 hours or at least twice per day OR Aleve (naproxen) 250-500mg every 12 hours as needed for pain and inflammation  You may also take Tylenol 500mg every 4-6 hours as needed OR max 1,000mg per dose up to 3 times per day for a total of 3,000mg per day  Check with your primary care physician to see if these medications are safe to take and to make sure they do not interfere with your other medications and medical issues

## 2022-09-13 NOTE — LETTER
September 13, 2022     Patient: Josh Sims  YOB: 1970  Date of Visit: 9/13/2022      To Whom it May Concern:    Selwyn iNchols is under my professional care  Otto Dennis was seen in my office on 9/13/2022  If you have any questions or concerns, please don't hesitate to call           Sincerely,          Onelia Sinha MD        CC: No Recipients
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
## 2022-09-14 ENCOUNTER — APPOINTMENT (OUTPATIENT)
Dept: PHYSICAL THERAPY | Facility: CLINIC | Age: 52
End: 2022-09-14
Payer: MEDICARE

## 2022-09-14 DIAGNOSIS — I10 HYPERTENSION, UNSPECIFIED TYPE: ICD-10-CM

## 2022-09-14 RX ORDER — FUROSEMIDE 20 MG/1
TABLET ORAL
Qty: 30 TABLET | Refills: 1 | Status: SHIPPED | OUTPATIENT
Start: 2022-09-14

## 2022-09-14 NOTE — PROGRESS NOTES
Daily Note     Today's date: 2022  Patient name: Quirino Balderas  : 1970  MRN: [id number]  Referring provider: Yasmeen Loja MD  Dx: No diagnosis found  Subjective: ***      Objective: See treatment diary below      Assessment: Tolerated treatment {Tolerated treatment :}   Patient {assessment:}      Plan: {PLAN:}     Precautions: COPD    Manuals           Shoulder stretching  NV 10' 10'          Joint mobes  NV NV                                     Neuro Re-Ed             Tb IR  NV RTB x15 RTB 2x10          Tb ER NV RTB x15 RTB 2x10          TB rows NV RTB x15 RTB 2x10          Tb extensions NV RTB x15 RTB 2x10          Tb horizontal abd  NV NV           Body blade IR/ER                          Ther Ex             UBE             Door pec stretch  NV 20"x3 20"x3          seated t/s extensions NV NV 10x5"          Prone Y             Prone T             Prone W             Wall push ups                           DB shoulder flxn  NV 1# 2x10          DB scaption  NV 1# 2x10          Ther Activity             Cane flexion 10x5" 10x5"           Cane ER 10x5" 10x5"           Cane abd 10x5" 10x5"           Ball flexion/abd Nv 10x5" ea 10x5" ea          Pulleys flexion/abd NV 2'/2' 2'/2'                                    Gait Training                                       Modalities

## 2022-09-14 NOTE — PROGRESS NOTES
Daily Note     Today's date: 9/15/2022  Patient name: Xu Casillas  : 1970  MRN: [id number]  Referring provider: Melania Jennings MD  Dx:   Encounter Diagnosis     ICD-10-CM    1  Chronic right shoulder pain  M25 511     G89 29    2  Calcific tendinitis of right shoulder  M75 31    3  Primary osteoarthritis of right shoulder  M19 011                   Subjective: Patent reports she saw MD and she is going to start A steroid pack for her pain  She is getting surgery for left elbow on   Objective: See treatment diary below      Assessment: Tolerated treatment well  Patient would benefit from continued PT  Patient tolerates all exercises well today  No complaint sof pain though reports she can feel pressure at anterior chest wall  She has good form and carryover with exercises  Patient will be DC at this time due to unrelated surgery next week  Plan: DC at this time due to scheduled left elbow surgery       Precautions: COPD    Manuals 8/31 9/8 9/15          Shoulder stretching  NV 10' 8'          Joint mobes  NV NV 2'                                    Neuro Re-Ed             Tb IR  NV RTB x15 R TB 2x10          Tb ER NV RTB x15 R TB 2x10          TB rows NV RTB x15 R TB 2x10          Tb extensions NV RTB x15 R Tb 2x10          Tb horizontal abd  NV NV           Body blade IR/ER                          Ther Ex             UBE             Door pec stretch  NV 20"x3           seated t/s extensions NV NV           Prone Y             Prone T             Prone W             Wall push ups                           DB shoulder flxn  NV           DB scaption  NV           Ther Activity             Cane flexion 10x5" 10x5" 10x5"          Cane ER 10x5" 10x5" 10x5"           Cane abd 10x5" 10x5"           Ball flexion/abd Nv 10x5" ea           Pulleys flexion/abd NV 2'/2' 2'/2'                                    Gait Training                                       Modalities

## 2022-09-15 ENCOUNTER — OFFICE VISIT (OUTPATIENT)
Dept: PHYSICAL THERAPY | Facility: CLINIC | Age: 52
End: 2022-09-15
Payer: MEDICARE

## 2022-09-15 DIAGNOSIS — M25.511 CHRONIC RIGHT SHOULDER PAIN: Primary | ICD-10-CM

## 2022-09-15 DIAGNOSIS — G89.29 CHRONIC RIGHT SHOULDER PAIN: Primary | ICD-10-CM

## 2022-09-15 DIAGNOSIS — M19.011 PRIMARY OSTEOARTHRITIS OF RIGHT SHOULDER: ICD-10-CM

## 2022-09-15 DIAGNOSIS — M75.31 CALCIFIC TENDINITIS OF RIGHT SHOULDER: ICD-10-CM

## 2022-09-15 PROCEDURE — 97140 MANUAL THERAPY 1/> REGIONS: CPT | Performed by: PHYSICAL THERAPIST

## 2022-09-15 PROCEDURE — 97112 NEUROMUSCULAR REEDUCATION: CPT | Performed by: PHYSICAL THERAPIST

## 2022-09-15 PROCEDURE — 97530 THERAPEUTIC ACTIVITIES: CPT | Performed by: PHYSICAL THERAPIST

## 2022-09-15 RX ORDER — VENLAFAXINE 37.5 MG/1
TABLET ORAL
COMMUNITY
Start: 2022-09-14 | End: 2022-09-15

## 2022-09-15 RX ORDER — TOPIRAMATE 50 MG/1
TABLET, FILM COATED ORAL
COMMUNITY
Start: 2022-09-14 | End: 2022-09-15

## 2022-09-15 RX ORDER — ACETAMINOPHEN 325 MG/1
650 TABLET ORAL EVERY 6 HOURS PRN
COMMUNITY

## 2022-09-15 RX ORDER — DIVALPROEX SODIUM 500 MG/1
500 TABLET, EXTENDED RELEASE ORAL 2 TIMES DAILY
COMMUNITY
Start: 2022-09-14

## 2022-09-15 NOTE — PRE-PROCEDURE INSTRUCTIONS
Pre-Surgery Instructions:   Medication Instructions    acetaminophen (TYLENOL) 325 mg tablet Uses PRN- OK to take day of surgery    albuterol (Ventolin HFA) 90 mcg/act inhaler Uses PRN- OK to take day of surgery    amLODIPine (NORVASC) 5 mg tablet Take day of surgery   buPROPion (WELLBUTRIN XL) 150 mg 24 hr tablet Take day of surgery   Cholecalciferol (Vitamin D3) 125 MCG (5000 UT) CAPS Stop taking 7 days prior to surgery   colestipol (COLESTID) 1 g tablet Hold day of surgery   divalproex sodium (DEPAKOTE ER) 500 mg 24 hr tablet Take day of surgery   estradiol (ESTRACE) 0 5 MG tablet Hold day of surgery   Fesoterodine Fumarate ER (Toviaz) 4 MG TB24 Hold day of surgery   fluticasone (FLOVENT HFA) 110 MCG/ACT inhaler Uses PRN- OK to take day of surgery    furosemide (LASIX) 20 mg tablet Hold day of surgery   haloperidol decanoate (Haldol Decanoate) 50 mg/mL injection Hold day of surgery  takes monthly    hydrOXYzine HCL (ATARAX) 25 mg tablet Uses PRN- OK to take day of surgery    levothyroxine 100 mcg tablet Take day of surgery   losartan (COZAAR) 50 mg tablet Hold day of surgery   lurasidone 60 MG TABS Take day of surgery   metoprolol tartrate (LOPRESSOR) 25 mg tablet Take day of surgery   pilocarpine (SALAGEN) 5 mg tablet Hold day of surgery   RABEprazole (ACIPHEX) 20 MG tablet Take day of surgery   rOPINIRole (REQUIP) 0 5 mg tablet Take night before surgery    topiramate (TOPAMAX) 100 mg tablet Take day of surgery  Patient  instructed on use of chlorhexidine soap(reports is able to use liquid CHG and no reaction) per hospital protocol    Patient instructed to stop all ASA, NSAIDS, vitamins and herbal supplements from now to surgery or per Dr Ellen Orta

## 2022-09-16 ENCOUNTER — TELEPHONE (OUTPATIENT)
Dept: NEUROLOGY | Facility: CLINIC | Age: 52
End: 2022-09-16

## 2022-09-16 NOTE — TELEPHONE ENCOUNTER
1st attempt to reach patient for referral   No answer  LMOM  Existing patient of Dana Elam  Schedule with memory

## 2022-09-20 ENCOUNTER — ANESTHESIA (OUTPATIENT)
Dept: PERIOP | Facility: AMBULARY SURGERY CENTER | Age: 52
End: 2022-09-20
Payer: MEDICARE

## 2022-09-20 ENCOUNTER — HOSPITAL ENCOUNTER (OUTPATIENT)
Facility: AMBULARY SURGERY CENTER | Age: 52
Setting detail: OUTPATIENT SURGERY
Discharge: HOME | End: 2022-09-20
Attending: SURGERY | Admitting: SURGERY
Payer: MEDICARE

## 2022-09-20 VITALS
HEART RATE: 72 BPM | RESPIRATION RATE: 18 BRPM | WEIGHT: 289 LBS | SYSTOLIC BLOOD PRESSURE: 132 MMHG | HEIGHT: 66 IN | DIASTOLIC BLOOD PRESSURE: 80 MMHG | BODY MASS INDEX: 46.45 KG/M2 | TEMPERATURE: 97 F | OXYGEN SATURATION: 94 %

## 2022-09-20 DIAGNOSIS — E03.9 HYPOTHYROIDISM: ICD-10-CM

## 2022-09-20 DIAGNOSIS — M77.02 MEDIAL EPICONDYLITIS OF ELBOW, LEFT: Primary | ICD-10-CM

## 2022-09-20 DIAGNOSIS — Z47.89 AFTERCARE FOLLOWING SURGERY OF THE MUSCULOSKELETAL SYSTEM: ICD-10-CM

## 2022-09-20 PROCEDURE — C1713 ANCHOR/SCREW BN/BN,TIS/BN: HCPCS | Performed by: SURGERY

## 2022-09-20 PROCEDURE — 24359 REPAIR ELBOW DEB/ATTCH OPEN: CPT | Performed by: PHYSICIAN ASSISTANT

## 2022-09-20 PROCEDURE — 24359 REPAIR ELBOW DEB/ATTCH OPEN: CPT | Performed by: SURGERY

## 2022-09-20 PROCEDURE — NC001 PR NO CHARGE: Performed by: SURGERY

## 2022-09-20 DEVICE — ANCHOR SUT FIBERTAK DX ST W/NDLS: Type: IMPLANTABLE DEVICE | Site: ELBOW | Status: FUNCTIONAL

## 2022-09-20 DEVICE — KIT DISP F/FIBERTAK DX: Type: IMPLANTABLE DEVICE | Site: ELBOW | Status: FUNCTIONAL

## 2022-09-20 RX ORDER — KETOROLAC TROMETHAMINE 30 MG/ML
INJECTION, SOLUTION INTRAMUSCULAR; INTRAVENOUS AS NEEDED
Status: DISCONTINUED | OUTPATIENT
Start: 2022-09-20 | End: 2022-09-20

## 2022-09-20 RX ORDER — ONDANSETRON 2 MG/ML
4 INJECTION INTRAMUSCULAR; INTRAVENOUS ONCE AS NEEDED
Status: DISCONTINUED | OUTPATIENT
Start: 2022-09-20 | End: 2022-09-20 | Stop reason: HOSPADM

## 2022-09-20 RX ORDER — CHLORHEXIDINE GLUCONATE 0.12 MG/ML
15 RINSE ORAL ONCE
Status: DISCONTINUED | OUTPATIENT
Start: 2022-09-20 | End: 2022-09-20 | Stop reason: HOSPADM

## 2022-09-20 RX ORDER — FENTANYL CITRATE/PF 50 MCG/ML
25 SYRINGE (ML) INJECTION
Status: DISCONTINUED | OUTPATIENT
Start: 2022-09-20 | End: 2022-09-20 | Stop reason: HOSPADM

## 2022-09-20 RX ORDER — BUPIVACAINE HYDROCHLORIDE 2.5 MG/ML
INJECTION, SOLUTION EPIDURAL; INFILTRATION; INTRACAUDAL
Status: COMPLETED | OUTPATIENT
Start: 2022-09-20 | End: 2022-09-20

## 2022-09-20 RX ORDER — HYDROCODONE BITARTRATE AND ACETAMINOPHEN 5; 325 MG/1; MG/1
1 TABLET ORAL EVERY 6 HOURS PRN
Status: DISCONTINUED | OUTPATIENT
Start: 2022-09-20 | End: 2022-09-20 | Stop reason: HOSPADM

## 2022-09-20 RX ORDER — HYDROMORPHONE HCL/PF 1 MG/ML
0.25 SYRINGE (ML) INJECTION
Status: DISCONTINUED | OUTPATIENT
Start: 2022-09-20 | End: 2022-09-20 | Stop reason: HOSPADM

## 2022-09-20 RX ORDER — FENTANYL CITRATE 50 UG/ML
INJECTION, SOLUTION INTRAMUSCULAR; INTRAVENOUS
Status: COMPLETED | OUTPATIENT
Start: 2022-09-20 | End: 2022-09-20

## 2022-09-20 RX ORDER — MIDAZOLAM HYDROCHLORIDE 2 MG/2ML
INJECTION, SOLUTION INTRAMUSCULAR; INTRAVENOUS
Status: COMPLETED | OUTPATIENT
Start: 2022-09-20 | End: 2022-09-20

## 2022-09-20 RX ORDER — DEXAMETHASONE SODIUM PHOSPHATE 10 MG/ML
INJECTION, SOLUTION INTRAMUSCULAR; INTRAVENOUS AS NEEDED
Status: DISCONTINUED | OUTPATIENT
Start: 2022-09-20 | End: 2022-09-20

## 2022-09-20 RX ORDER — PROPOFOL 10 MG/ML
INJECTION, EMULSION INTRAVENOUS AS NEEDED
Status: DISCONTINUED | OUTPATIENT
Start: 2022-09-20 | End: 2022-09-20

## 2022-09-20 RX ORDER — ONDANSETRON 2 MG/ML
INJECTION INTRAMUSCULAR; INTRAVENOUS AS NEEDED
Status: DISCONTINUED | OUTPATIENT
Start: 2022-09-20 | End: 2022-09-20

## 2022-09-20 RX ORDER — HYDROCODONE BITARTRATE AND ACETAMINOPHEN 5; 325 MG/1; MG/1
1 TABLET ORAL EVERY 6 HOURS PRN
Qty: 15 TABLET | Refills: 0 | Status: SHIPPED | OUTPATIENT
Start: 2022-09-20 | End: 2022-09-30

## 2022-09-20 RX ORDER — CEFAZOLIN SODIUM 2 G/50ML
2000 SOLUTION INTRAVENOUS ONCE
Status: COMPLETED | OUTPATIENT
Start: 2022-09-20 | End: 2022-09-20

## 2022-09-20 RX ORDER — LIDOCAINE HYDROCHLORIDE 20 MG/ML
INJECTION, SOLUTION EPIDURAL; INFILTRATION; INTRACAUDAL; PERINEURAL AS NEEDED
Status: DISCONTINUED | OUTPATIENT
Start: 2022-09-20 | End: 2022-09-20

## 2022-09-20 RX ORDER — SODIUM CHLORIDE, SODIUM LACTATE, POTASSIUM CHLORIDE, CALCIUM CHLORIDE 600; 310; 30; 20 MG/100ML; MG/100ML; MG/100ML; MG/100ML
INJECTION, SOLUTION INTRAVENOUS CONTINUOUS PRN
Status: DISCONTINUED | OUTPATIENT
Start: 2022-09-20 | End: 2022-09-20

## 2022-09-20 RX ADMIN — SODIUM CHLORIDE, SODIUM LACTATE, POTASSIUM CHLORIDE, AND CALCIUM CHLORIDE: .6; .31; .03; .02 INJECTION, SOLUTION INTRAVENOUS at 10:44

## 2022-09-20 RX ADMIN — SODIUM CHLORIDE, SODIUM LACTATE, POTASSIUM CHLORIDE, AND CALCIUM CHLORIDE: .6; .31; .03; .02 INJECTION, SOLUTION INTRAVENOUS at 12:15

## 2022-09-20 RX ADMIN — CEFAZOLIN SODIUM 2000 MG: 2 SOLUTION INTRAVENOUS at 11:23

## 2022-09-20 RX ADMIN — BUPIVACAINE HYDROCHLORIDE 30 ML: 2.5 INJECTION, SOLUTION EPIDURAL; INFILTRATION; INTRACAUDAL at 11:10

## 2022-09-20 RX ADMIN — FENTANYL CITRATE 25 MCG: 50 INJECTION, SOLUTION INTRAMUSCULAR; INTRAVENOUS at 11:19

## 2022-09-20 RX ADMIN — LIDOCAINE HYDROCHLORIDE 100 MG: 20 INJECTION, SOLUTION EPIDURAL; INFILTRATION; INTRACAUDAL at 11:23

## 2022-09-20 RX ADMIN — FENTANYL CITRATE 25 MCG: 50 INJECTION, SOLUTION INTRAMUSCULAR; INTRAVENOUS at 11:36

## 2022-09-20 RX ADMIN — KETOROLAC TROMETHAMINE 15 MG: 30 INJECTION, SOLUTION INTRAMUSCULAR at 12:18

## 2022-09-20 RX ADMIN — PROPOFOL 200 MG: 10 INJECTION, EMULSION INTRAVENOUS at 11:23

## 2022-09-20 RX ADMIN — DEXAMETHASONE SODIUM PHOSPHATE 10 MG: 10 INJECTION, SOLUTION INTRAMUSCULAR; INTRAVENOUS at 11:25

## 2022-09-20 RX ADMIN — ONDANSETRON 4 MG: 2 INJECTION INTRAMUSCULAR; INTRAVENOUS at 11:25

## 2022-09-20 RX ADMIN — MIDAZOLAM HYDROCHLORIDE 1 MG: 1 INJECTION, SOLUTION INTRAMUSCULAR; INTRAVENOUS at 11:07

## 2022-09-20 RX ADMIN — MIDAZOLAM HYDROCHLORIDE 1 MG: 1 INJECTION, SOLUTION INTRAMUSCULAR; INTRAVENOUS at 11:19

## 2022-09-20 RX ADMIN — FENTANYL CITRATE 25 MCG: 50 INJECTION, SOLUTION INTRAMUSCULAR; INTRAVENOUS at 11:47

## 2022-09-20 RX ADMIN — FENTANYL CITRATE 25 MCG: 50 INJECTION, SOLUTION INTRAMUSCULAR; INTRAVENOUS at 11:07

## 2022-09-20 NOTE — ANESTHESIA POSTPROCEDURE EVALUATION
Post-Op Assessment Note    CV Status:  Stable  Pain Score: 0    Pain management: adequate     Mental Status:  Arousable and sleepy   Hydration Status:  Stable   PONV Controlled:  Controlled   Airway Patency:  Patent      Post Op Vitals Reviewed: Yes      Staff: CRNA         No complications documented      BP   111/62   Temp 97 6   Pulse 77   Resp 16   SpO2 99

## 2022-09-20 NOTE — OP NOTE
OPERATIVE REPORT  PATIENT NAME: Edis Lujan  :  1970  MRN: [id number]  Pt [facility]    SURGERY DATE: 22    Surgeon(s) and Role:     Ludmila Johnson MD - Primary     * Veena Cummings PA-C - Assisting    Pre-Op Diagnosis:  Medial epicondylitis of elbow, left [M77 02]    Post-Op Diagnosis Codes:     * Medial epicondylitis of elbow, left [M77 02]    Procedure(s):  RELEASE EPICONDYLAR ELBOW MEDIAL (Left)    Specimen(s):  * No orders in the log *    Estimated Blood Loss:   Minimal    Anesthesia Type:   Regional with Sedation    IMPLANTS:  Implant Name Type Inv  Item Serial No   Lot No  LRB No  Used Action   ANCHOR SUT FIBERTAK DX ST W/NDLS - [id number]  ANCHOR SUT FIBERTAK DX ST W/NDLS  [employer] [id number] Left 1 Implanted       PERIOPERATIVE ANTIBIOTICS:    cefazolin, 2 grams    Tourniquet Time: 25 min xl hemaclear            Operative Indications: The patient has a history of Medial Epicondylitis  left that was recalcitrant to conservative management  The decision was made to bring the patient to the operating room for Medial Epicondylar release, debridement and common flexor origin repair  left  Risks of the procedure were explained which include, but are not limited to bleeding; infection; damage to nerves, arteries,veins, tendons; scar; pain; need for reoperation; failure to give desired result; and risks of anaesthesia  All questions were answered to satisfaction and they were willing to proceed  Operative Findings:  Thinning of the common flexor origin without amilcar tear     Complications:   None    Procedure and Technique:  After the patient, site, and procedure were identified, the patient was brought into the operating room in a supine position  The  left upper extremity was then prepped and drapped in a normal, sterile, orthopedic fashion       A longitudinal incision was made on the medial aspect starting at the medial epicondyle and extending distally  Care was taken to protect the superficial neurovascular structures while maintaining hemostasis  The fascia overlying the muscle was identified  Common flexor origin was identified   Inspection demonstrated thinning  The common flexor origin was split longitudinally, and the underside inspected, no significant angiofibroblastic metaplasia was noted  The medial epicondyle was debrided of any inflammatory tissue back to healthy bleeding bone, and a to find mm SutureTak was appropriately drilled and placed at the common flexor origin  The healthy tendon tissue was then repaired the knot on the underside of the common flexor tendon, with the wrist in flexion  And a watertight closure was performed of the longitudinal split  The ulnar collateral ligament was stressed to ensure that no damage was done and it was competent  At the completion of the procedure, hemostasis was obtained with cautery and direct pressure  The wounds were copiously irrigated with sterile solution  The wounds were closed with Monocryl and Steri-strips  Sterile dressings were applied, including Xeroform, gauze, tweeners, webril, ACE, Posterior splint and Sling  Please note, all sponge, needle, and instrument counts were correct prior to closure  Loupe magnification was utilized  The patient tolerated the procedure well       I was present for the entire procedure, A qualified resident physician was not available and A physician assistant was required during the procedure for retraction tissue handling,dissection and suturing    Patient Disposition:  PACU     SIGNATURE: Sofía Hunter MD  DATE: 09/20/22  TIME: 12:21 PM

## 2022-09-20 NOTE — ANESTHESIA PROCEDURE NOTES
Peripheral Block    Patient location during procedure: pre-op  Start time: 9/20/2022 11:10 AM  Reason for block: at surgeon's request and post-op pain management  Staffing  Performed: Anesthesiologist   Anesthesiologist: Albina Porter MD  Preanesthetic Checklist  Completed: patient identified, IV checked, site marked, risks and benefits discussed, surgical consent, monitors and equipment checked, pre-op evaluation and timeout performed  Peripheral Block  Patient position: supine  Prep: alcohol swabs  Patient monitoring: heart rate, frequent blood pressure checks and continuous pulse ox  Block type: infraclavicular  Laterality: left  Injection technique: single-shot  Procedures: ultrasound guided, Ultrasound guidance required for the procedure to increase accuracy and safety of medication placement and decrease risk of complications    Ultrasound permanent image savedbupivacaine (PF) (MARCAINE) 0 25 % 10 mL - Perineural   30 mL - 9/20/2022 11:10:00 AM  midazolam (VERSED) 2 mg/2 mL - Intravenous   1 mg - 9/20/2022 11:07:00 AM  fentaNYL 50 mcg/mL - Intravenous   25 mcg - 9/20/2022 11:07:00 AM  Needle  Needle type: Stimuplex   Needle gauge: 22 G  Needle length: 10 cm  Needle localization: ultrasound guidance  Assessment  Injection assessment: incremental injection, local visualized surrounding nerve on ultrasound, negative aspiration for heme and no paresthesia on injection  Paresthesia pain: none  Heart rate change: no  Slow fractionated injection: yes  Post-procedure:  site cleaned  patient tolerated the procedure well with no immediate complications

## 2022-09-20 NOTE — DISCHARGE INSTRUCTIONS
Post Operative Instructions    You have had surgery on your arm today, please read and follow the information below:  Elevate your hand above your elbow during the next 24-48 hours to help with swelling  Place your hand and arm over your head with motion at your shoulder three times a day  Do not apply any cream/ointment/oil to your incisions including antibiotics  Do not soak your hands in standing water (dishwater, tubs, Jacuzzi's, pools, etc ) until given permission (typically 2-3 weeks after injury)    Call the office if you notice any:  Increased numbness or tingling of your hand or fingers that is not relieved with elevation  Increasing pain that is not controlled with medication  Difficulty chewing, breathing, swallowing  Chest pains or shortness of breath  Fever over 101 4 degrees  Bandage: Please keep bandages clean and dry  Do NOT remove bandage until follow-up appointment  Please do NOT put any topical agents on the surgical wound including neosporin, peroxide, tea tree oil, vitamin E, etc  as these can delay wound healing  Motion: Move fingers into a fist 5 times a day, DO NOT move any splinted fingers  Weight bearing status: Avoid heavy lifting (>5 pounds) with the extremity that was operated on until follow up appointment  Normal activities of daily living are OK  Ice: Ice for 10 minutes every hour as needed for swelling x 24 hours  Sling: Sling for comfort for 2-3 days  Discontinue sling when comfortable  Pain medication:   Naproxen 220 mg two time a day (do not take this medication if you were told by your doctor that you cannot take anti-inflammatories or NSAIDS)  Tylenol Extended Release 650 mg every 8 hours  Norco/Hydrocodone one tab every 6 hours ONLY AS NEEDED for severe pain         Follow-up Appointment: 10-14 days with Dr Luis Rob        Please call the office if you have any questions or concerns regarding your post-operative care

## 2022-09-20 NOTE — ANESTHESIA PREPROCEDURE EVALUATION
Procedure:  RELEASE EPICONDYLAR ELBOW MEDIAL (Left Elbow)    Relevant Problems   CARDIO   (+) Benign essential hypertension   (+) Mixed hyperlipidemia      ENDO   (+) Hypothyroidism      GI/HEPATIC   (+) GERD (gastroesophageal reflux disease)   (+) Hemorrhage of rectum and anus      HEMATOLOGY   (+) Platelets decreased (HCC)      MUSCULOSKELETAL   (+) Chronic low back pain   (+) DDD (degenerative disc disease), lumbar   (+) Medial epicondylitis of elbow, left   (+) Primary osteoarthritis of both knees   (+) Spondylosis of lumbosacral joint without myelopathy      NEURO/PSYCH   (+) Anxiety   (+) Chronic low back pain   (+) Chronic tension-type headache, intractable   (+) History of COVID-19   (+) Major depressive disorder      PULMONARY   (+) COPD (chronic obstructive pulmonary disease) (HCC)   (+) MAG (obstructive sleep apnea)        Physical Exam    Airway    Mallampati score: II  TM Distance: >3 FB  Neck ROM: full     Dental   No notable dental hx     Cardiovascular  Cardiovascular exam normal    Pulmonary  Pulmonary exam normal     Other Findings    Severe MAG on CPAP  COPD, compensated  Morbid obesity    Anesthesia Plan  ASA Score- 3     Anesthesia Type- general with ASA Monitors  Additional Monitors:   Airway Plan: LMA  Plan Factors-Exercise tolerance (METS): >4 METS  Chart reviewed  EKG reviewed  Existing labs reviewed  Patient summary reviewed  Patient is not a current smoker  Induction- intravenous  Postoperative Plan- Plan for postoperative opioid use  Informed Consent- Anesthetic plan and risks discussed with patient  I personally reviewed this patient with the CRNA  Discussed and agreed on the Anesthesia Plan with the CRNA  Leeroy Montoya

## 2022-09-20 NOTE — H&P
ASSESSMENT/PLAN:       48 yo female with medial epicondylitis   Reviewed diagnosis with the patient along with treatment options  At this point she has tried therapy and injections as well as bracing all of which have helped intermittently but not provided lasting relief  We did review her MRI from 2021 which was negative for any lateral or medial epicondylitis, however do with this is worth repeating prior to proceeding with surgery  We discussed a medial epicondylectomy along with risks benefits and alternatives  Patient would like to proceed and did sign informed consent today  Plan to review MRI prior to surgery  We discussed postop protocol including extended splinting and continued OT  Follow up after surgery     The patient verbalized understanding of exam findings and treatment plan  We engaged in the shared decision-making process and treatment options were discussed at length with the patient  Surgical and conservative management discussed today along with risks and benefits      Diagnoses and all orders for this visit:     Medial epicondylitis of elbow, left  -     MRI elbow left wo contrast; Future  -     Case request operating room: RELEASE EPICONDYLAR ELBOW MEDIAL; Standing  -     Case request operating room: RELEASE EPICONDYLAR ELBOW MEDIAL     Ganglion cyst of finger of left hand  -     Ambulatory Referral to Hand Surgery     Other orders  -     Nursing Communication Warmimg Interventions Implemented; Standing  -     Nursing Communication CHG bath, have staff wash entire body (neck down) per pre-op bathing protocol  Routine, evening prior to, and day of surgery ; Standing  -     Nursing Communication Swab both nares with Povidone-Iodine solution, EXCLUDE if patient has shellfish/Iodine allergy  Routine, day of surgery, on call to OR; Standing  -     chlorhexidine (PERIDEX) 0 12 % oral rinse 15 mL  -     Void on call to OR; Standing  -     Insert peripheral IV;  Standing  -     Diet NPO; Sips with meds; Standing  -     ceFAZolin (ANCEF) IVPB (premix in dextrose) 2,000 mg 50 mL  -     Apply Sequential Compression Device; Standing          Medial Epicondylitis Release: The anatomy and physiology of medial epicondylitis was discussed with the patient today  Typically, degenerative changes in the flexor pronator mass occur over time  These degenerative changes appear as tears of the tendon on MRI  This creates pain over the medial epicondyle  This pain typically is made worse with palm up lifting activities as well as anything that involves strength and stability of the wrist   The pain may radiate from the wrist up to the elbow  At times, the shoulder may be weak as well which can predispose or cause continuation of the problem  Conservative treatment usually cures a vast majority of patients; however, this may take up to 6-9 months  Conservative treatment options typically include activity modification, therapy for strengthening of the shoulder and elbow, tennis elbow strap, and possible corticosteroid injections  Corticosteroid injections are very effective at relieving pain but do not alter the natural history of this process  Rather, steroid injections decrease the pain temporarily to allow for therapy to take place without discomfort  It was discussed that therapy to prevent recurrence is a "life long" process and that if patient relies on the steroid injection alone without performing therapeutic exercises the risk of recurrence is likely  Typical home regimen includes heat,  stretching and resisted wrist flex ion and forearm rotation exercises discussed in the office  Surgery is required in fewer than 5% of patients  At times, the ulnar nerve may be aggravated with this condition  The patient has elected surgical release of the medical epicondylitis  The risks and benefits of the procedure were explained to the patient, which include, but are not limited to: Bleeding, infection, recurrence, pain, scar, damage to tendons, damage to nerves, and damage to blood vessels, failure to give desired results and complications related to anesthesia  These risks, along with alternative conservative treatment options, and postoperative protocols were voiced back and understood by the patient  All questions were answered to the patient's satisfaction  The patient agrees to comply with a standard postoperative protocol, and is willing to proceed  Education was provided via written and auditory forms  There were no barriers to learning  Written handouts regarding wound care, incision and scar care, and general preoperative information was provided to the patient  Prior to surgery, the patient may be requested to stop all anti-inflammatory medications  Prophylactic aspirin, Plavix, and Coumadin may be allowed to be continued  Medications including vitamin E , ginkgo, and fish oil are requested to be stopped approximately one week prior to surgery  Hypertensive medications and beta blockers, if taken, should be continued      Follow Up:  Return for After Surgery         To Do Next Visit:  Re-evaluation of current issue     ____________________________________________________________________________________________________________________________________________        CHIEF COMPLAINT:      Chief Complaint   Patient presents with    Right Elbow - Follow-up    Left Elbow - Pain, Follow-up         SUBJECTIVE:  Edis Lujan is a 49y o  year old RHD female who presents for follow up of chronic left elbow pian  She was seen by us in January for lateral epicondylitis and then again by Dr Laura Moran for medial epicondylitis  She recently under went an injection at the medial epicondyle which helped her pain for about a month  She continues to have medial elbow pain which inhibits her daily tasks  No paresthesias   She does have hx of cubital tunnel release without transposition          I have personally reviewed all the relevant PMH, PSH, SH, FH, Medications and allergies       PAST MEDICAL HISTORY:       Past Medical History:   Diagnosis Date    Anxiety      Arthritis       oa cassandra knees    Asthma       good control- no medications    Yan's esophagus      Bipolar affective disorder (HCC)      Chronic pain disorder       lumbar    CPAP (continuous positive airway pressure) dependence      Degenerative disc disease at L5-S1 level      Deliberate self-cutting      Depression 09/16/2008    Disease of thyroid gland       hypo    MARTINEZ (dyspnea on exertion)      Drug overdose 10/28/2008     suicide attempt    Dysphagia      Dyspnea      Edema       BLE    GERD (gastroesophageal reflux disease)      High blood pressure      History of COVID-19 12/30/2020     Symptoms started on 12/30/2020 and then got worse  Today she is feeling a little bit better  She is a candidate for monoclonal antibody infusion and set for 1/6/21 @ 1pm at [facility]  01/07/21 - telemedicine -     Hypertension      Knee pain, bilateral       Especially right    Migraines      Obese      Overactive bladder      Sjogren's disease (HCC)      Sleep apnea      Stress incontinence      Suicidal ideations      Umbilical hernia       surgical repair today 4/24/2019    Use of cane as ambulatory aid       awaiting b/l knee replacement    Wears glasses           PAST SURGICAL HISTORY:        Past Surgical History:   Procedure Laterality Date    BREAST CYST EXCISION Right 1989    CARPAL TUNNEL RELEASE Left      CHOLECYSTECTOMY   05/2003     Laparoscopic    COLONOSCOPY         01/12/2009    DILATION AND CURETTAGE OF UTERUS        ELBOW SURGERY Left       x2   No hardware    ESOPHAGOGASTRODUODENOSCOPY        FOOT SURGERY Left       Plantar fasciotomy    HYSTERECTOMY         laporoscopic, partial    KNEE ARTHROSCOPY Bilateral      OOPHORECTOMY Left 10/2015    NC ANAL SPHINCTEROTOMY N/A 08/31/2018     Procedure: EUA, LEFT PARTIAL INTERNAL SPHINCTEROTOMY;  Surgeon: De Vargas MD;  Location: [address] [place] [address] MAIN OR;  Service: Colorectal    FL GASTROCNEMIUS RECESSION Left 2021     Procedure: RECESSION GASTROC OPEN;  Surgeon: Sheryle Numbers, DPM;  Location: [address] [place] [address] MAIN OR;  [facility] TACO,4+N/X,KCJKC N/A 2019     Procedure: REPAIR HERNIA UMBILICAL LAPAROSCOPIC W/ ROBOTICS;  Surgeon: Pavan Lindquist MD;  Location: [facility] Main OR;  Service: General    REDUCTION MAMMAPLASTY Bilateral 1999    REPAIR LACERATION Left       left hand-2009    REPLACEMENT TOTAL KNEE Right      ROTATOR CUFF REPAIR Left      TONSILECTOMY AND ADNOIDECTOMY        TONSILLECTOMY        US GUIDED MSK PROCEDURE   2021    VEIN LIGATION Bilateral      WISDOM TOOTH EXTRACTION             FAMILY HISTORY:        Family History   Problem Relation Age of Onset    Kidney cancer Mother      Diabetes Mother      No Known Problems Father      No Known Problems Sister      No Known Problems Maternal Grandmother      No Known Problems Maternal Grandfather      No Known Problems Paternal Grandmother      No Known Problems Paternal Grandfather      Colon cancer Maternal Uncle      Colon cancer Maternal Uncle      Colon cancer Paternal Uncle      Colon cancer Family           SOCIAL HISTORY:  Social History            Tobacco Use    Smoking status: Former Smoker       Packs/day: 2 00       Years: 33 00       Pack years: 66 00       Types: Cigarettes       Start date: 5       Quit date: 2018       Years since quittin 4    Smokeless tobacco: Never Used    Tobacco comment: Started at age 14  Vaping Use    Vaping Use: Some days    Substances: THC   Substance Use Topics    Alcohol use: Not Currently       Comment: Recovering alcoholic    Drug use:  Yes       Types: Marijuana, Methamphetamines       Comment: medical- vapes 3 times per wk at          MEDICATIONS:     Current Outpatient Medications:     amLODIPine (NORVASC) 5 mg tablet, TAKE 1 TABLET (5 MG TOTAL) BY MOUTH DAILY, Disp: 30 tablet, Rfl: 2    buPROPion (WELLBUTRIN XL) 150 mg 24 hr tablet, Take 150 mg by mouth daily, Disp: , Rfl:     buPROPion (WELLBUTRIN XL) 300 mg 24 hr tablet, Take 1 tablet (300 mg total) by mouth daily (Patient taking differently: Take 450 mg by mouth daily), Disp: 30 tablet, Rfl: 0    cetirizine (ZyrTEC) 10 mg tablet, , Disp: , Rfl:     Cholecalciferol (Vitamin D3) 125 MCG (5000 UT) CAPS, , Disp: , Rfl:     Cholecalciferol (Vitamin D3) 125 MCG (5000 UT) TABS, Take 5,000 Units by mouth daily, Disp: , Rfl:     colestipol (COLESTID) 1 g tablet, Take 1 tablet (1 g total) by mouth 2 (two) times a day, Disp: 60 tablet, Rfl: 0    desonide (DESOWEN) 0 05 % ointment, , Disp: , Rfl:     divalproex sodium (DEPAKOTE ER) 250 mg 24 hr tablet, Take 3 tablets (750 mg total) by mouth daily (Patient taking differently: Take 1,000 mg by mouth daily), Disp: 90 tablet, Rfl: 0    divalproex sodium (DEPAKOTE ER) 500 mg 24 hr tablet, Take 1,000 mg by mouth daily, Disp: , Rfl:     estradiol (Estrace) 0 5 MG tablet, Take 1 tablet (0 5 mg total) by mouth daily, Disp: 30 tablet, Rfl: 2    Fesoterodine Fumarate ER (Toviaz) 4 MG TB24, Take 1 tablet by mouth daily, Disp: , Rfl:     fluticasone (FLOVENT HFA) 110 MCG/ACT inhaler, Inhale 2 puffs 2 (two) times a day Rinse mouth after use , Disp: , Rfl:     furosemide (LASIX) 20 mg tablet, TAKE 1 TABLET BY MOUTH DAILY, Disp: 30 tablet, Rfl: 1    haloperidol decanoate (Haldol Decanoate) 50 mg/mL injection, Inject 50 mg into a muscle every 30 (thirty) days, Disp: , Rfl:     levothyroxine 100 mcg tablet, Take 1 tablet (100 mcg total) by mouth daily in the early morning, Disp: 90 tablet, Rfl: 1    losartan (COZAAR) 50 mg tablet, TAKE ONE TABLET BY MOUTH DAILY, Disp: 30 tablet, Rfl: 10    lurasidone 60 MG TABS, Take 1 tablet (60 mg total) by mouth daily with dinner, Disp: 30 tablet, Rfl: 0    metoprolol tartrate (LOPRESSOR) 25 mg tablet, Take 1 tablet (25 mg total) by mouth every 12 (twelve) hours, Disp: 60 tablet, Rfl: 5    olopatadine (PATANOL) 0 1 % ophthalmic solution, , Disp: , Rfl:     pilocarpine (SALAGEN) 5 mg tablet, Take 5 mg by mouth 3 (three) times a day, Disp: , Rfl:     RABEprazole (ACIPHEX) 20 MG tablet, Take 20 mg by mouth 2 (two) times a day, Disp: , Rfl:     rOPINIRole (REQUIP) 0 5 mg tablet, , Disp: , Rfl:     topiramate (TOPAMAX) 100 mg tablet, Take 1 tablet (100 mg total) by mouth 2 (two) times a day, Disp: 60 tablet, Rfl: 0    triamcinolone (KENALOG) 0 1 % cream, Apply topically 2 (two) times a day, Disp: 30 g, Rfl: 0    venlafaxine (EFFEXOR) 37 5 mg tablet, Take 37 5 mg by mouth in the morning, Disp: , Rfl:     venlafaxine (EFFEXOR-XR) 75 mg 24 hr capsule, Take 1 capsule by mouth in the morning (Patient not taking: Reported on 6/13/2022), Disp: , Rfl:      ALLERGIES:        Allergies   Allergen Reactions    Percocet [Oxycodone-Acetaminophen] Headache       Severe headaches   (denies issues with Tylenol)    Betadine [Povidone Iodine] Rash       Unsure if betadine or gauze post surgical  Got rash on leg   Chlorhexidine Rash         REVIEW OF SYSTEMS:  Review of Systems   Constitutional: Negative for chills, fatigue and fever  HENT: Negative for hearing loss, nosebleeds and sore throat  Eyes: Negative for redness and visual disturbance  Respiratory: Negative for cough, shortness of breath and wheezing  Cardiovascular: Negative for chest pain, palpitations and leg swelling  Gastrointestinal: Negative for abdominal pain, nausea and vomiting  Endocrine: Negative for polydipsia and polyuria  Genitourinary: Negative for difficulty urinating, dysuria and hematuria  Musculoskeletal: Positive for arthralgias  Negative for joint swelling and myalgias  Skin: Negative for rash and wound  Neurological: Negative for speech difficulty, weakness, numbness and headaches  Psychiatric/Behavioral: Negative for decreased concentration and suicidal ideas  The patient is not nervous/anxious           VITALS:      Vitals:     06/13/22 1208   BP: 121/78   Pulse: 69   Resp: 17         LABS:  HgA1c:         Lab Results   Component Value Date     HGBA1C 5 2 02/22/2022      BMP:         Lab Results   Component Value Date     CALCIUM 9 2 02/22/2022     K 4 1 02/22/2022     CO2 26 02/22/2022      (H) 02/22/2022     BUN 17 02/22/2022     CREATININE 0 64 02/22/2022         _____________________________________________________  PHYSICAL EXAMINATION:  General: well developed and well nourished, alert, oriented times 3 and appears comfortable  Psychiatric: Normal  HEENT: Normocephalic, Atraumatic Trachea Midline, No torticollis  Pulmonary: No audible wheezing or respiratory distress   Cardiovascular: No pitting edema, 2+ radial pulse   Skin: No masses, erythema, lacerations, fluctation, ulcerations  Neurovascular: Sensation Intact to the Median, Ulnar, Radial Nerve, Motor Intact to the Median, Ulnar, Radial Nerve and Pulses Intact  Musculoskeletal: Normal, except as noted in detailed exam and in HPI         MUSCULOSKELETAL EXAMINATION:  Left Elbow:     No swelling or deformity  Full range of motion with flexion, extension, supination and pronation  Positive tenderness to palpation over the Medial Epicondyle  No pain with passive flexion of the wrist with elbow fully extended  Pain with resisted wrist flexion with elbow fully extended  Pain with resisted forearm pronation with the elbow fully extended    Negative tinels over the ulnar nerve at the elbow      Healed surgical scar at the medial elbow     ___________________________________________________  STUDIES REVIEWED:  US guided common flexor injection sucessful

## 2022-09-22 ENCOUNTER — PROCEDURE VISIT (OUTPATIENT)
Dept: NEUROLOGY | Facility: CLINIC | Age: 52
End: 2022-09-22
Payer: MEDICARE

## 2022-09-22 VITALS — TEMPERATURE: 97.3 F | DIASTOLIC BLOOD PRESSURE: 86 MMHG | SYSTOLIC BLOOD PRESSURE: 128 MMHG | HEART RATE: 63 BPM

## 2022-09-22 DIAGNOSIS — G43.709 CHRONIC MIGRAINE WITHOUT AURA WITHOUT STATUS MIGRAINOSUS, NOT INTRACTABLE: Primary | ICD-10-CM

## 2022-09-22 PROCEDURE — 64615 CHEMODENERV MUSC MIGRAINE: CPT | Performed by: PHYSICIAN ASSISTANT

## 2022-09-22 NOTE — PROGRESS NOTES

## 2022-09-23 NOTE — TELEPHONE ENCOUNTER
2nd attempt to reach patient for referral   No answer  LMOM  Existing patient of Kelvin Gallardo  Schedule with memory

## 2022-10-01 RX ORDER — BUPROPION HYDROCHLORIDE 300 MG/1
TABLET ORAL
COMMUNITY
Start: 2022-09-16

## 2022-10-03 ENCOUNTER — OFFICE VISIT (OUTPATIENT)
Dept: OBGYN CLINIC | Facility: CLINIC | Age: 52
End: 2022-10-03

## 2022-10-03 ENCOUNTER — OFFICE VISIT (OUTPATIENT)
Dept: OCCUPATIONAL THERAPY | Age: 52
End: 2022-10-03
Payer: MEDICARE

## 2022-10-03 ENCOUNTER — TELEPHONE (OUTPATIENT)
Dept: OBGYN CLINIC | Facility: HOSPITAL | Age: 52
End: 2022-10-03

## 2022-10-03 VITALS
DIASTOLIC BLOOD PRESSURE: 84 MMHG | HEART RATE: 69 BPM | SYSTOLIC BLOOD PRESSURE: 138 MMHG | BODY MASS INDEX: 46.65 KG/M2 | HEIGHT: 66 IN

## 2022-10-03 DIAGNOSIS — Z09 SURGERY FOLLOW-UP: Primary | ICD-10-CM

## 2022-10-03 PROCEDURE — L3763 EWHO RIGID W/O JNTS CF: HCPCS

## 2022-10-03 PROCEDURE — 99024 POSTOP FOLLOW-UP VISIT: CPT | Performed by: PHYSICIAN ASSISTANT

## 2022-10-03 RX ORDER — ROPINIROLE 1 MG/1
TABLET, FILM COATED ORAL
COMMUNITY
Start: 2022-09-26

## 2022-10-03 RX ORDER — DIVALPROEX SODIUM 250 MG/1
TABLET, EXTENDED RELEASE ORAL
COMMUNITY
Start: 2022-09-26

## 2022-10-03 NOTE — TELEPHONE ENCOUNTER
Patient was seen today and patient needs a new updated letter for her job    Please fax to employer: [phone]  Attn: Curt Huerta    Please Advise  CB: [phone]

## 2022-10-03 NOTE — PROGRESS NOTES
Orthosis    Diagnosis:   1  Surgery follow-up       Indication: s/p medial epicondylar release    Location: Left  elbow  Supplies: Custom Fit Orthotic and Skin coverage   Orthosis type: Long arm  Wearing Schedule: Remove for HEP  Describe Position: Patient splinted with wrist and forearm in neutral and elbow in approximately 60 degrees of flexion    Precautions: Universal (skin contact/breakdown)    Patient or Caregiver expresses understanding of wearing Schedule and Precautions? Yes  Patient or Caregiver able to don/doff orthotic independently? Yes    Written orders provided to patient? Yes  Orders Obtained: Written  Orders Obtained from: [facility]    Return for evaluation and treatment Yes     The splint size was determined after performing appropriate custom measurements  The splint was then applied to the patient, securing all straps with moderate tension  The splint was then re-evaluated to confirm fit and verify that no compromise of circulation was occurring  All bony prominences were evaluated and padding was utilized as needed to further protect bony prominences  Application instructions and care of the splint were reviewed in detail with the patient, including need for regular inspection of the splint  It was verified that the patient was able to perform independent application and removal of the splint with appropriate technique  Usage instructions were reviewed as per the physician's order  The patient verbalized an understanding of all instructions  The patient was instructed to call as needed with questions  Issued patient therapist phone number, extension and hours  No areas of irritation present prior to leaving the clinic

## 2022-10-03 NOTE — PROGRESS NOTES
l  Orthopaedic Surgery - Office Note  Blair Barboza (60 y o  female)   : 1970   MRN: [id number]  Encounter Date: 10/3/2022    Chief Complaint   Patient presents with    Left Elbow - Post-op         Assessment/Plan  Diagnoses and all orders for this visit:    Surgery [id number]-fw-[id number] post left release epicondylar elbow medial 2022 Dr Roseanna Amaya  -     Ambulatory Referral to Occupational Therapy; Future    Other orders  -     buPROPion (WELLBUTRIN XL) 300 mg 24 hr tablet  -     divalproex sodium (DEPAKOTE ER) 250 mg 24 hr tablet  -     rOPINIRole (REQUIP) 1 mg tablet    Patient will be splinted in a long arm with elbow in 60 of flexion wrist neutral position at OT today  Patient will follow-up with occupational therapy as soon as possible as already scheduled-per surgeon protocol  Patient will follow-up with surgeon     Return for Recheck with Dr Roseanna Amaya as scheduled or in 2 weeks  History of Present Illness  Patient is here for postop visit after left release epicondylar elbow medially on 2022 with Dr Roseanna Amaya  Patient denies any postoperative complications  Patient reports her pain is well controlled and she does not experiencing any paresthesias  Review of Systems  Pertinent items are noted in HPI  All other systems were reviewed and are negative  Physical Exam  /84   Pulse 69   Ht 5' 6" (1 676 m)   BMI 46 65 kg/m²   Cons: Appears well  No apparent distress  Psych: Alert  Oriented x3  Mood and affect normal   Eyes: PERRLA, EOMI  Resp: Normal effort  No audible wheezing or stridor  CV: Palpable pulse  No discernable arrhythmia  Lymph:  No palpable cervical, axillary, or inguinal lymphadenopathy  Skin: Warm  No palpable masses  No visible lesions  Neuro: Normal muscle tone  Normal and symmetric DTR's  Patient's left elbow incisions are healing well there are no signs of infection  Wound margins well aligned  Sutures were removed without incident  Patient is neurovascular grossly intact in left upper extremity  Range of motion was not assessed  Studies Reviewed  Operative report was reviewed by myself in the office today  Procedures  No procedures today  Medical, Surgical, Family, and Social History  The patient's medical history, family history, and social history, were reviewed and updated as appropriate  Past Medical History:   Diagnosis Date    Anxiety     Arthritis     oa cassandra knees    Asthma     good control- no medications    Yan's esophagus     Bipolar affective disorder (HCC)     Chronic pain disorder     lumbar    CPAP (continuous positive airway pressure) dependence     Degenerative disc disease at L5-S1 level     Deliberate self-cutting     9/15/22per pt has not done recently-- does see a therapist and psychiatrist regularly    Depression 09/16/2008    Disease of thyroid gland     hypo    MARTINEZ (dyspnea on exertion)     per pt "has had this with exertion and not new"    Drug overdose 10/28/2008    suicide attempt and again drug overdose 7/2022-- [facility] floor and than transferred to [facility]    Dysphagia     Dyspnea     Edema     BLE    Family history of blood clots     GERD (gastroesophageal reflux disease)     High blood pressure     History of COVID-19 12/30/2020    Symptoms started on 12/30/2020 and then got worse  Today she is feeling a little bit better    She is a candidate for monoclonal antibody infusion and set for 1/6/21 @ 1pm at [facility]  01/07/21 - telemedicine -     Hyperlipidemia     Hypertension     Knee pain, bilateral     Especially right    Medical marijuana use     Migraines     Obesity     Overactive bladder     Sjogren's disease ([facility] [address]     Sleep apnea     Stress incontinence     Suicidal ideations     Teeth missing     Tremor     per pt Tremors of Right Leg and both Arms    Wears glasses        Past Surgical History:   Procedure Laterality Date    BREAST CYST EXCISION Right 1989    CARPAL TUNNEL RELEASE Left     CHOLECYSTECTOMY  05/2003    Laparoscopic    COLONOSCOPY      01/12/2009    DILATION AND CURETTAGE OF UTERUS      ELBOW SURGERY Left     x2   No hardware    ESOPHAGOGASTRODUODENOSCOPY      FOOT SURGERY Left     Plantar fasciotomy    HYSTERECTOMY      laporoscopic, partial    KNEE ARTHROSCOPY Bilateral     OOPHORECTOMY Left 10/2015    MI ANAL SPHINCTEROTOMY N/A 08/31/2018    Procedure: EUA, LEFT PARTIAL INTERNAL SPHINCTEROTOMY;  Surgeon: Gilmer Higginbotham MD;  Location: [address] [place] [address] MAIN OR;  Service: Colorectal    MI GASTROCNEMIUS RECESSION Left 02/24/2021    Procedure: RECESSION GASTROC OPEN;  Surgeon: David Araujo DPM;  Location: [address] [place] [address] MAIN OR;  Service: [address] OLXB,1+E/V,ETZDO N/A 04/24/2019    Procedure: REPAIR HERNIA UMBILICAL LAPAROSCOPIC W/ ROBOTICS;  Surgeon: Mandi Jewell MD;  Location: [facility] Main OR;  Service: General    MI TENOTOMY ELBOW LATERAL/MEDIAL DEBRIDE REPAIR Left 9/20/2022    Procedure: RELEASE EPICONDYLAR ELBOW MEDIAL;  Surgeon: Breanna Morejon MD;  [facility] MAIN OR;  Service: Orthopedics    REDUCTION MAMMAPLASTY Bilateral 1999    REPAIR LACERATION Left     left hand-5/18/2009    REPLACEMENT TOTAL KNEE Right     ROTATOR CUFF REPAIR Left     TONSILECTOMY AND ADNOIDECTOMY     [address] [facility] PROCEDURE  04/22/2021    VEIN LIGATION Bilateral     WISDOM TOOTH EXTRACTION         Family History   Problem Relation Age of Onset    Kidney cancer Mother     Diabetes Mother     No Known Problems Father     No Known Problems Sister     No Known Problems Maternal Grandmother     No Known Problems Maternal Grandfather     No Known Problems Paternal Grandmother     No Known Problems Paternal Grandfather     Colon cancer Maternal Uncle     Colon cancer Maternal Uncle     Colon cancer Paternal Uncle     Colon cancer Family        Social History     Occupational History    Not on file   Tobacco Use    Smoking status: Former Smoker     Packs/day: 2 00     Years: 33 00     Pack years: 66 00     Types: Cigarettes     Start date: 5     Quit date: 2018     Years since quittin 7    Smokeless tobacco: Never Used    Tobacco comment: Started at age 14  Vaping Use    Vaping Use: Some days    Substances: THC   Substance and Sexual Activity    Alcohol use: Not Currently     Comment: Recovering alcoholic    Drug use: Yes     Types: Marijuana, Methamphetamines     Comment: medical- vapes 3 times per wk at [facility]--per pt last used Methamphetamine--3 weeks later 2022 or so    Sexual activity: Not on file     Comment: defer       Allergies   Allergen Reactions    Percocet [Oxycodone-Acetaminophen] Headache     Severe headaches   (denies issues with Tylenol)    Povidone Iodine Rash     Unsure if betadine or gauze post surgical  Got rash on leg     Has  Had itchy rashes after every surgery prep and IV insertion    Chlorhexidine Rash     Per pt "able to use the liquid soap--thinkd reaction from the sponges or wipes"         Current Outpatient Medications:     acetaminophen (TYLENOL) 325 mg tablet, Take 650 mg by mouth every 6 (six) hours as needed for mild pain, Disp: , Rfl:     albuterol (Ventolin HFA) 90 mcg/act inhaler, Inhale 2 puffs every 6 (six) hours as needed for wheezing, Disp: 18 g, Rfl: 5    amLODIPine (NORVASC) 5 mg tablet, TAKE 1 TABLET (5 MG TOTAL) BY MOUTH DAILY, Disp: 30 tablet, Rfl: 1    buPROPion (WELLBUTRIN XL) 150 mg 24 hr tablet, Take 1 tablet (150 mg total) by mouth daily, Disp: 30 tablet, Rfl: 0    buPROPion (WELLBUTRIN XL) 300 mg 24 hr tablet, , Disp: , Rfl:     Cholecalciferol (Vitamin D3) 125 MCG (5000 UT) CAPS, Take by mouth in the morning, Disp: , Rfl:     colestipol (COLESTID) 1 g tablet, Take 1 tablet (1 g total) by mouth 2 (two) times a day, Disp: 60 tablet, Rfl: 0    divalproex sodium (DEPAKOTE ER) 250 mg 24 hr tablet, , Disp: , Rfl:     divalproex sodium (DEPAKOTE ER) 500 mg 24 hr tablet, 500 mg 2 (two) times a day, Disp: , Rfl:     divalproex sodium (DEPAKOTE) 250 mg EC tablet, Take 3 tablets (750 mg total) by mouth every 12 (twelve) hours, Disp: 180 tablet, Rfl: 0    estradiol (ESTRACE) 0 5 MG tablet, Take 1 tablet (0 5 mg total) by mouth daily, Disp: 30 tablet, Rfl: 2    Fesoterodine Fumarate ER (Toviaz) 4 MG TB24, Take 1 tablet by mouth daily, Disp: , Rfl:     fluticasone (FLOVENT HFA) 110 MCG/ACT inhaler, Inhale 2 puffs as needed Rinse mouth after use , Disp: , Rfl:     furosemide (LASIX) 20 mg tablet, TAKE 1 TABLET BY MOUTH DAILY, Disp: 30 tablet, Rfl: 1    haloperidol decanoate (Haldol Decanoate) 50 mg/mL injection, every 30 (thirty) days, Disp: , Rfl:     hydrOXYzine HCL (ATARAX) 25 mg tablet, Take 1 tablet (25 mg total) by mouth every 6 (six) hours as needed for itching (mild anxiety), Disp: 60 tablet, Rfl: 0    levothyroxine 100 mcg tablet, Take 1 tablet (100 mcg total) by mouth daily in the early morning, Disp: 90 tablet, Rfl: 1    losartan (COZAAR) 50 mg tablet, TAKE ONE TABLET BY MOUTH DAILY, Disp: 30 tablet, Rfl: 10    lurasidone 60 MG TABS, Take 1 tablet (60 mg total) by mouth daily with breakfast (Patient taking differently: Take 60 mg by mouth 2 (two) times a day TAKEN IN THE MORNING AND AT NIGHT-----Latuda), Disp: 30 tablet, Rfl: 0    methylPREDNISolone 4 MG tablet therapy pack, Use as directed on package, Disp: 1 each, Rfl: 0    metoprolol tartrate (LOPRESSOR) 25 mg tablet, TAKE ONE TABLET BY MOUTH EVERY 12 HOURS, Disp: 60 tablet, Rfl: 4    pilocarpine (SALAGEN) 5 mg tablet, Take 5 mg by mouth 2 (two) times a day, Disp: , Rfl:     RABEprazole (ACIPHEX) 20 MG tablet, Take 20 mg by mouth 2 (two) times a day, Disp: , Rfl:     rOPINIRole (REQUIP) 0 5 mg tablet, Take by mouth daily at bedtime, Disp: , Rfl:     rOPINIRole (REQUIP) 1 mg tablet, , Disp: , Rfl:     topiramate (TOPAMAX) 100 mg tablet, Take 1 tablet (100 mg total) by mouth 2 (two) times a day (Patient taking differently: Take 150 mg by mouth 2 (two) times a day), Disp: 60 tablet, Rfl: 0      Cm Crum PA-C

## 2022-10-03 NOTE — TELEPHONE ENCOUNTER
Hello,  Please advise if the following patient can be forced onto the schedule:    Patient: Kehinde Casanova    : 1970    MRN: [id number]    Call back #: [phone]    Insurance: Medicare    Reason for appointment:2 week f/u  L elbow surgery    Requested doctor/location:  W[facility] Baptiste    Thank you

## 2022-10-04 ENCOUNTER — TELEPHONE (OUTPATIENT)
Dept: OBGYN CLINIC | Facility: HOSPITAL | Age: 52
End: 2022-10-04

## 2022-10-04 ENCOUNTER — HOSPITAL ENCOUNTER (OUTPATIENT)
Dept: MRI IMAGING | Facility: HOSPITAL | Age: 52
Discharge: HOME | End: 2022-10-04
Payer: MEDICARE

## 2022-10-04 DIAGNOSIS — S29.011A STRAIN OF RIGHT PECTORALIS MUSCLE, INITIAL ENCOUNTER: ICD-10-CM

## 2022-10-04 PROCEDURE — 71550 MRI CHEST W/O DYE: CPT

## 2022-10-04 PROCEDURE — G1004 CDSM NDSC: HCPCS

## 2022-10-05 DIAGNOSIS — I10 HYPERTENSION, UNSPECIFIED TYPE: ICD-10-CM

## 2022-10-05 RX ORDER — AMLODIPINE BESYLATE 5 MG/1
5 TABLET ORAL DAILY
Qty: 30 TABLET | Refills: 0 | Status: SHIPPED | OUTPATIENT
Start: 2022-10-05

## 2022-10-10 ENCOUNTER — EVALUATION (OUTPATIENT)
Dept: OCCUPATIONAL THERAPY | Age: 52
End: 2022-10-10
Payer: MEDICARE

## 2022-10-10 DIAGNOSIS — Z09 SURGERY FOLLOW-UP: ICD-10-CM

## 2022-10-10 PROCEDURE — 97165 OT EVAL LOW COMPLEX 30 MIN: CPT

## 2022-10-10 PROCEDURE — 97110 THERAPEUTIC EXERCISES: CPT

## 2022-10-10 NOTE — PROGRESS NOTES
OT Evaluation     Today's date: 10/10/2022  Patient name: Ginger Baxter  : 1970  MRN: [id number]  Referring provider: ROMAN Ward*  Dx:   Encounter Diagnosis     ICD-10-CM    1  Surgery [id number] post left release epicondylar elbow medial 2022 Dr Kristofer Mackey Ambulatory Referral to Occupational Therapy       Start Time: 7360  Stop Time: 8007  Total time in clinic (min): 49 minutes    Assessment  Assessment details: Patient reported that she began pain in her elbow years ago  Patient reported that she has had two surgeries in the past to try to alleviate the problem  Patient underwent left medial epicondylar release 22 and referred to therapy for splint and range of motion  Ginger Baxter is a 49 y o  female who presents with Surgery [id number]-[id number] post left release epicondylar elbow medial 2022 Dr Luis Rob  Patient tolerated session well  Floweree Felix reported difficulty with activities of daily living secondary to decreased range of motion and restrictions  Provided home exercise program for wrist, forearm, and elbow range of motion  Patient was able to demonstrate home program past instruction with use of handouts  Patient advised to contact doctor if there is a change of status  Patient advised to discontinue any exercise which cause increased pain  Patient is a good candidate to benefit from skilled occupational therapy to address impairments and return to maximal level of function with minimal symptoms      Impairments: abnormal or restricted ROM, activity intolerance, impaired physical strength, lacks appropriate home exercise program, pain with function and weight-bearing intolerance  Understanding of Dx/Px/POC: good   Prognosis: good    Goals  Short term goals:  Patient to demonstrate understanding of home exercise program in 2 weeks for decreased pain with activities of daily living  Patient to demonstrate increased active range of motion of elbow to 135/-10 degrees in 3 weeks to aid in showering  Patient to demonstrate decreased edema by 2 cm in 4 weeks to increase range of motion for dressing  Patient to report a decrease in pain by at least 1 point on a 0-10 scale in 2 weeks to aid in dressing    Long term goals:  Patient to demonstrate functional active range of motion for independent ADL's by time of discharge  Patient to demonstrate functional strength for independent ADL's by time of discharge  Patient to demonstrate understanding of final discharge home exercise program by time of discharge    Plan  Patient would benefit from: OT eval, skilled occupational therapy and custom splinting  Planned modality interventions: ultrasound and thermotherapy: hydrocollator packs  Planned therapy interventions: joint mobilization, manual therapy, massage, strengthening, stretching, therapeutic activities, therapeutic exercise, fine motor coordination training, flexibility, functional ROM exercises, home exercise program, activity modification, patient education, graded activity and graded exercise  Frequency: 2x week  Duration in weeks: 10  Plan of Care beginning date: 10/10/2022  Plan of Care expiration date: 2022  Treatment plan discussed with: patient        Subjective Evaluation    History of Present Illness  Date of surgery: 2022  Mechanism of injury: Patient reported that she began pain in her elbow years ago  Patient reported that she has had two surgeries in the past to try to alleviate the problem  Patient underwent left medial epicondylar release 22 and referred to therapy for splint and range of motion  "I've had some help with showering and stuff like that " Patient reported being able to do laundry this morning, but doing most of the work with her right hand " Patient reported that she has been unable to wear a bra     Quality of life: good    Pain  At best pain ratin  At worst pain ratin  Location: medial elbow  Progression: improved    Social Support    Employment status: working ([employer]- [employer]  -currently out of work   Hobbies: andrez)  Hand dominance: right    Patient Goals  Patient goals for therapy: decreased pain, increased motion, increased strength, independence with ADLs/IADLs, return to sport/leisure activities and return to work          Objective     Observations     Additional Observation Details  Dense scar at medial elbow    Neurological Testing     Sensation     Elbow   Left Elbow   Intact: light touch    Right Elbow   Intact: light touch    Active Range of Motion     Left Elbow   Flexion: 120 degrees   Extension: -20 degrees   Forearm supination: 70 degrees   Forearm pronation: 65 degrees     Right Elbow   Flexion: 146 degrees   Extension: 0 degrees   Forearm supination: 65 degrees   Forearm pronation: 70 degrees     Left Wrist   Wrist flexion: 50 degrees   Wrist extension: 44 degrees     Right Wrist   Wrist flexion: 60 degrees   Wrist extension: 58 degrees     Additional Active Range of Motion Details  Digit range of motion within normal limits    Swelling   Left Elbow Girth Measurements   Joint line: 31 cm    Right Elbow Girth Measurements   Joint line: 28 cm    Left Wrist/Hand   Circumference wrist: 17 2 cm    Right Wrist/Hand   Circumference wrist: 17 4 cm             Precautions: L medial epicondylar release 9/20/22  Weeks 1-4   -Fabricate long arm splint   -Scar and edema management   -AROM for the wrist, forearm, elbow   -Avoid composite wrist extension with elbow in extension  4 weeks   -Transition to WHO    -Continue motion and may begin gentle strengthening  8 weeks   -May begin more aggressive strengthening   -D/c WHO      Manuals 10/10                                                                Neuro Re-Ed                                                                                                        Ther Ex             AROM elbow (wrist neutral) x10            AROM forearm (elbow 90) x10            AROM wrist (elbow 90) x10                                                                             Ther Activity                                                                              Modalities             Moist heat

## 2022-10-13 ENCOUNTER — APPOINTMENT (OUTPATIENT)
Dept: OCCUPATIONAL THERAPY | Age: 52
End: 2022-10-13

## 2022-10-17 ENCOUNTER — OFFICE VISIT (OUTPATIENT)
Dept: OCCUPATIONAL THERAPY | Age: 52
End: 2022-10-17
Payer: MEDICARE

## 2022-10-17 DIAGNOSIS — Z09 SURGERY FOLLOW-UP: Primary | ICD-10-CM

## 2022-10-17 DIAGNOSIS — M77.02 MEDIAL EPICONDYLITIS OF ELBOW, LEFT: ICD-10-CM

## 2022-10-17 PROCEDURE — 97110 THERAPEUTIC EXERCISES: CPT

## 2022-10-17 PROCEDURE — 97140 MANUAL THERAPY 1/> REGIONS: CPT

## 2022-10-17 NOTE — PROGRESS NOTES
Daily Note     Today's date: 10/17/2022  Patient name: Rox Coley  : 1970  MRN: [id number]  Referring provider: ROMAN Alicia*  Dx:   Encounter Diagnosis     ICD-10-CM    1  Surgery ndneix-gg-[id number] post left release epicondylar elbow medial 2022 Dr Rae [facility]  Z09    2  Medial epicondylitis of elbow, left  M77 02                   Subjective: "My middle finger feels tight "      Objective: See treatment diary below    Active Range of Motion     Left Elbow   Flexion: 137 (was 120 degrees)   Extension: -8 (was -20 degrees)  Forearm supination: 70 (was 70 degrees)  Forearm pronation: 80 (was 65 degrees)     Left Wrist   Wrist flexion: 60 (was 50 degrees)   Wrist extension: 52 (was 44 degrees)     Assessment: Tolerated treatment well  Pt is compliant with HEP and presents with improved AROM  Patient exhibited good technique with therapeutic exercises and would benefit from continued OT      Plan: Continue per plan of care  Progress treament per protocol    Transition to [facility] next visit     Precautions: L medial epicondylar release 22  Weeks 1-4   -Fabricate long arm splint   -Scar and edema management   -AROM for the wrist, forearm, elbow   -Avoid composite wrist extension with elbow in extension  4 weeks   -Transition to [facility]    -Continue motion and may begin gentle strengthening  8 weeks   -May begin more aggressive strengthening   -D/c WHO      Manuals 10/10 10/17           STM  5'           Scar mobs  5'                                     Neuro Re-Ed                                                                                                        Ther Ex             AROM elbow (wrist neutral) x10 2x10 reaching for/handing off cone           AROM forearm (elbow 90) x10 2x10 w/cone           AROM wrist (elbow 90) x10 2x10           TGE  15x                                                               Ther Activity             FMC/translation  Key pegs 2x Modalities             Moist heat  5'

## 2022-10-19 ENCOUNTER — OFFICE VISIT (OUTPATIENT)
Dept: OBGYN CLINIC | Facility: CLINIC | Age: 52
End: 2022-10-19

## 2022-10-19 VITALS
BODY MASS INDEX: 45 KG/M2 | DIASTOLIC BLOOD PRESSURE: 87 MMHG | HEART RATE: 72 BPM | HEIGHT: 66 IN | SYSTOLIC BLOOD PRESSURE: 139 MMHG | WEIGHT: 280 LBS

## 2022-10-19 DIAGNOSIS — Z09 SURGERY FOLLOW-UP: Primary | ICD-10-CM

## 2022-10-19 PROCEDURE — 99024 POSTOP FOLLOW-UP VISIT: CPT | Performed by: SURGERY

## 2022-10-19 NOTE — PROGRESS NOTES
Assessment/Plan:  Patient ID: Donalda Phoenix 47 y o  female   Surgery: Release Epicondylar Elbow Medial - Left  Date of Surgery: 9/20/2022    She is to continue with OT and wearing long arm splint  In two weeks they can make custom wrist neutral brace   She may removal splint when showering   Continue with current work restrictions, no use of left arm while at work for the next eight weeks  She is currently wearing a long arm splint during her preoperative period  Follow Up:  4 weeks     To Do Next Visit:    re evaluate current issue       CHIEF COMPLAINT:  No chief complaint on file  SUBJECTIVE:  Donalda Phoenix is a 47y o  year old female who presents for follow up after Release Epicondylar Elbow Medial - Left  Today patient has no pain  She is currently in OT is and tolerating the sessions well  She is wearing custom made long arm splint  She is currently not taking any pain medications         PHYSICAL EXAMINATION:  General: well developed and well nourished, alert, oriented times 3 and appears comfortable  Psychiatric: Normal    MUSCULOSKELETAL EXAMINATION:  Incision: Clean, dry, intact and healed  Surgery Site: normal, no evidence of infection   Range of Motion: As expected  Neurovascular status: Neuro intact, good cap refill  Activity Restrictions: No restrictions         STUDIES REVIEWED:  No Studies to review      PROCEDURES PERFORMED:  Procedures  No Procedures performed today    Scribe Attestation    I,:  Live Kowalski am acting as a scribe while in the presence of the attending physician :       I,:  Regine Stuart MD personally performed the services described in this documentation    as scribed in my presence :

## 2022-10-19 NOTE — LETTER
October 19, 2022     Patient: Matteo Moses  YOB: 1970  Date of Visit: 10/19/2022      To Whom it May Concern:    Quan Ferrera is under my professional care  Lashay Vang was seen in my office on 10/19/2022  Lahsay Vang is to continue wearing splint left upper extremity  No use of the left upper extremity while at work for the next 8 weeks  If you have any questions or concerns, please don't hesitate to call           Sincerely,          Sofía Hunter MD        CC: No Recipients

## 2022-10-21 ENCOUNTER — OFFICE VISIT (OUTPATIENT)
Dept: OCCUPATIONAL THERAPY | Age: 52
End: 2022-10-21
Payer: MEDICARE

## 2022-10-21 DIAGNOSIS — M77.02 MEDIAL EPICONDYLITIS OF ELBOW, LEFT: ICD-10-CM

## 2022-10-21 DIAGNOSIS — Z09 SURGERY FOLLOW-UP: Primary | ICD-10-CM

## 2022-10-21 PROCEDURE — 97140 MANUAL THERAPY 1/> REGIONS: CPT

## 2022-10-21 PROCEDURE — 97110 THERAPEUTIC EXERCISES: CPT

## 2022-10-21 NOTE — PROGRESS NOTES
Daily Note     Today's date: 10/21/2022  Patient name: Sarina Hill  : 1970  MRN: [id number]  Referring provider: ROMAN Samuel*  Dx:   Encounter Diagnosis     ICD-10-CM    1  Surgery [id number]-[id number]-[id number] post left release epicondylar elbow medial 2022 Dr Yokasta Mcdowell  Z09    2  Medial epicondylitis of elbow, left  M77 02                   Subjective: "I have this rash that won'y go away      Objective: See treatment diary below    Active Range of Motion     Assessment: Tolerated treatment well  Pt is compliant with HEP and presents with improved AROM  Patient exhibited good technique with therapeutic exercises and would benefit from continued OT      Plan: Continue per plan of care  Progress treament per protocol    Transition to [facility] next visit     Precautions: L medial epicondylar release 22  Weeks 1-4   -Fabricate long arm splint   -Scar and edema management   -AROM for the wrist, forearm, elbow   -Avoid composite wrist extension with elbow in extension  4 weeks   -Transition to [facility]    -Continue motion and may begin gentle strengthening  8 weeks   -May begin more aggressive strengthening   -D/c WHO      Manuals 10/10 10/17 10/21          STM  5' 5'          Scar mobs  5' 5'                                    Neuro Re-Ed                                                                                                        Ther Ex             AROM elbow (wrist neutral) x10 2x10 reaching for/handing off cone 2x10 cone           AROM forearm (elbow 90) x10 2x10 w/cone 2x10 with cone          AROM wrist (elbow 90) x10 2x10 2x10 with cone          TGE  15x                                                               Ther Activity             FMC/translation  Key pegs 2x                                                               Modalities             Moist heat  5' 5'

## 2022-10-22 ENCOUNTER — OFFICE VISIT (OUTPATIENT)
Dept: OBGYN CLINIC | Facility: MEDICAL CENTER | Age: 52
End: 2022-10-22
Payer: MEDICARE

## 2022-10-22 VITALS
HEART RATE: 63 BPM | DIASTOLIC BLOOD PRESSURE: 82 MMHG | HEIGHT: 66 IN | WEIGHT: 286 LBS | BODY MASS INDEX: 45.96 KG/M2 | SYSTOLIC BLOOD PRESSURE: 126 MMHG

## 2022-10-22 DIAGNOSIS — S29.011D STRAIN OF RIGHT PECTORALIS MUSCLE, SUBSEQUENT ENCOUNTER: Primary | ICD-10-CM

## 2022-10-22 DIAGNOSIS — G89.29 CHRONIC RIGHT SHOULDER PAIN: ICD-10-CM

## 2022-10-22 DIAGNOSIS — M75.31 CALCIFIC TENDINITIS OF RIGHT SHOULDER: ICD-10-CM

## 2022-10-22 DIAGNOSIS — M25.511 CHRONIC RIGHT SHOULDER PAIN: ICD-10-CM

## 2022-10-22 DIAGNOSIS — M19.011 PRIMARY OSTEOARTHRITIS OF RIGHT SHOULDER: ICD-10-CM

## 2022-10-22 PROCEDURE — 99212 OFFICE O/P EST SF 10 MIN: CPT | Performed by: EMERGENCY MEDICINE
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
## 2022-10-24 ENCOUNTER — OFFICE VISIT (OUTPATIENT)
Dept: OCCUPATIONAL THERAPY | Age: 52
End: 2022-10-24
Payer: MEDICARE

## 2022-10-24 DIAGNOSIS — Z09 SURGERY FOLLOW-UP: ICD-10-CM

## 2022-10-24 DIAGNOSIS — M77.02 MEDIAL EPICONDYLITIS OF ELBOW, LEFT: Primary | ICD-10-CM

## 2022-10-24 PROCEDURE — 97110 THERAPEUTIC EXERCISES: CPT

## 2022-10-24 PROCEDURE — 97140 MANUAL THERAPY 1/> REGIONS: CPT

## 2022-10-24 NOTE — PROGRESS NOTES
Daily Note     Today's date: 10/24/2022  Patient name: Kaia Wilkerson  : 1970  MRN: [id number]  Referring provider: ROMAN Lorenzana*  Dx:   Encounter Diagnosis     ICD-10-CM    1  Medial epicondylitis of elbow, left  M77 02    2  Surgery [id number] post left release epicondylar elbow medial 2022 Dr Lenoard Barthel  [id number]                   Subjective: "I have this rash that won'y go away      Objective: See treatment diary below    Active Range of Motion     Assessment: Tolerated treatment well  Pt is compliant with HEP and presents with improved AROM  Patient exhibited good technique with therapeutic exercises and would benefit from continued OT      Plan: Continue per plan of care  Progress treament per protocol    Transition to [facility] next visit     Precautions: L medial epicondylar release 22  Weeks 1-4   -Fabricate long arm splint   -Scar and edema management   -AROM for the wrist, forearm, elbow   -Avoid composite wrist extension with elbow in extension  4 weeks   -Transition to [facility]    -Continue motion and may begin gentle strengthening  8 weeks   -May begin more aggressive strengthening   -D/c [facility]      Manuals 10/10 10/17 10/21          STM  5' 5'          Scar mobs  5' 5'                                    Neuro Re-Ed                                                                                                        Ther Ex             AROM elbow (wrist neutral) x10 2x10 reaching for/handing off cone 2x10 cone           AROM forearm (elbow 90) x10 2x10 w/cone 2x10 with cone          AROM wrist (elbow 90) x10 2x10 2x10 with cone          TGE  15x                                                               Ther Activity             FMC/translation  Key pegs 2x                                                               Modalities             Moist heat  5' 5'                            Daily Note     Today's date: 10/24/2022  Patient name: Kaia Wilkerson  : 1970  MRN: [id number]  Referring provider: ROMAN Hair*  Dx:   Encounter Diagnosis     ICD-10-CM    1  Medial epicondylitis of elbow, left  M77 02    2  Surgery [id number] post left release epicondylar elbow medial 09/20/2022 Dr Cora Zaman  [id number]                   Subjective: "It's alright"      Objective: See treatment diary below    Active Range of Motion     Assessment: Tolerated treatment well  Patient exhibited good technique with therapeutic exercises and would benefit from continued OT      Plan: Continue per plan of care  Progress treament per protocol    Transition to [facility] next visit     Precautions: L medial epicondylar release 9/20/22  Weeks 1-4   -Fabricate long arm splint   -Scar and edema management   -AROM for the wrist, forearm, elbow   -Avoid composite wrist extension with elbow in extension  4 weeks   -Transition to [facility]    -Continue motion and may begin gentle strengthening  8 weeks   -May begin more aggressive strengthening   -D/c [facility]      Manuals 10/10 10/17 10/21 10/24         STM  5' 5' 5'         Scar mobs  5' 5' 5'                                   Neuro Re-Ed                                                                                                        Ther Ex             AROM elbow (wrist neutral) x10 2x10 reaching for/handing off cone 2x10 cone  3x10 cone         AROM forearm (elbow 90) x10 2x10 w/cone 2x10 with cone 3x10 with cone         AROM wrist (elbow 90) x10 2x10 2x10 with cone 3x10 with cone         TGE  15x                                                               Ther Activity             FMC/translation  Key pegs 2x                                                               Modalities             Moist heat  5' 5'

## 2022-10-25 ENCOUNTER — OFFICE VISIT (OUTPATIENT)
Dept: FAMILY MEDICINE CLINIC | Facility: CLINIC | Age: 52
End: 2022-10-25

## 2022-10-25 VITALS
SYSTOLIC BLOOD PRESSURE: 134 MMHG | HEART RATE: 81 BPM | WEIGHT: 284 LBS | HEIGHT: 66 IN | OXYGEN SATURATION: 97 % | DIASTOLIC BLOOD PRESSURE: 80 MMHG | BODY MASS INDEX: 45.64 KG/M2 | TEMPERATURE: 97.6 F

## 2022-10-25 DIAGNOSIS — L25.9 CONTACT DERMATITIS, UNSPECIFIED CONTACT DERMATITIS TYPE, UNSPECIFIED TRIGGER: Primary | ICD-10-CM

## 2022-10-25 RX ORDER — VENLAFAXINE 37.5 MG/1
TABLET ORAL
COMMUNITY
Start: 2022-10-19

## 2022-10-25 NOTE — PROGRESS NOTES
Subjective:   Chief Complaint   Patient presents with   • Rash     Left arm , x couple weeks  Face, tissue may be the cause         Patient ID: Isai Araiza is a 47 y o  female  Patient presents with rash on left arm from wrist up to armpit  Itching and drainage noted  Wearing splint to left arm due to prior surgery  Showers in the morning, denies new fragrance, lotions, detergents  Rash  The current episode started 1 to 4 weeks ago  The problem has been waxing and waning since onset  The affected locations include the left axilla, left arm, left elbow, left hand and left wrist  The rash is characterized by burning, pain, redness and itchiness  It is unknown if there was an exposure to a precipitant  Pertinent negatives include no anorexia, congestion, cough, diarrhea, facial edema, fatigue, fever, joint pain, rhinorrhea, shortness of breath, sore throat or vomiting  The following portions of the patient's history were reviewed and updated as appropriate: allergies, current medications, past family history, past medical history, past social history, past surgical history and problem list     Review of Systems   Constitutional: Negative for chills, fatigue and fever  HENT: Negative for congestion, rhinorrhea and sore throat  Eyes: Negative for pain  Respiratory: Negative for cough and shortness of breath  Gastrointestinal: Negative for anorexia, diarrhea and vomiting  Musculoskeletal: Negative for joint pain  Skin: Positive for rash  Objective:  Vitals:    10/25/22 1502   BP: 134/80   BP Location: Right arm   Patient Position: Sitting   Cuff Size: Adult   Pulse: 81   Temp: 97 6 °F (36 4 °C)   TempSrc: Temporal   SpO2: 97%   Weight: 129 kg (284 lb)   Height: 5' 6" (1 676 m)      Physical Exam  Constitutional:       Appearance: She is obese  Cardiovascular:      Rate and Rhythm: Normal rate and regular rhythm  Heart sounds: Normal heart sounds     Pulmonary: Effort: Pulmonary effort is normal       Breath sounds: Normal breath sounds  Musculoskeletal:         General: Normal range of motion  Skin:     General: Skin is warm and dry  Capillary Refill: Capillary refill takes less than 2 seconds  Findings: Erythema and rash present  Rash is papular and urticarial    Neurological:      Mental Status: She is alert and oriented to person, place, and time  Psychiatric:         Mood and Affect: Mood normal          Behavior: Behavior normal            Assessment/Plan:    No problem-specific Assessment & Plan notes found for this encounter  Diagnoses and all orders for this visit:    Contact dermatitis, unspecified contact dermatitis type, unspecified trigger  -     hydrocortisone 2 5 % ointment; Apply topically 2 (two) times a day  -     mupirocin (BACTROBAN) 2 % ointment;  Apply topically 3 (three) times a day    Other orders  -     venlafaxine (EFFEXOR) 37 5 mg tablet

## 2022-10-27 ENCOUNTER — OFFICE VISIT (OUTPATIENT)
Dept: OCCUPATIONAL THERAPY | Age: 52
End: 2022-10-27
Payer: MEDICARE

## 2022-10-27 DIAGNOSIS — M77.02 MEDIAL EPICONDYLITIS OF ELBOW, LEFT: ICD-10-CM

## 2022-10-27 DIAGNOSIS — Z09 SURGERY FOLLOW-UP: Primary | ICD-10-CM

## 2022-10-27 PROCEDURE — L3906 WHO W/O JOINTS CF: HCPCS

## 2022-10-27 PROCEDURE — 97110 THERAPEUTIC EXERCISES: CPT

## 2022-10-27 NOTE — PROGRESS NOTES
DAILY NOTE       Subjective: "It's itchy"      Objective: See treatment diary below    Active Range of Motion     Assessment: Tolerated treatment well  Patient exhibited good technique with therapeutic exercises and would benefit from continued OT Transitioned to [facility] secondary to contact dermatitis which is not improving at is at the end of the [facility] Splint  Plan: Continue per plan of care  Progress treament per protocol         Precautions: L medial epicondylar release 9/20/22  Weeks 1-4   -Fabricate long arm splint   -Scar and edema management   -AROM for the wrist, forearm, elbow   -Avoid composite wrist extension with elbow in extension  4 weeks   -Transition to [facility]    -Continue motion and may begin gentle strengthening  8 weeks   -May begin more aggressive strengthening   -D/c [facility]      Manuals 10/10 10/17 10/21 10/24 10/27        STM  5' 5' 5' 5'        Scar mobs  5' 5' 5' 5'                                  Neuro Re-Ed                                                                                                        Ther Ex             AROM elbow (wrist neutral) x10 2x10 reaching for/handing off cone 2x10 cone  3x10 cone 3x10 cone        AROM forearm (elbow 90) x10 2x10 w/cone 2x10 with cone 3x10 with cone 3x10 with cone        AROM wrist (elbow 90) x10 2x10 2x10 with cone 3x10 with cone 3x10 with cone        TGE  15x                                                               Ther Activity             FMC/translation  Key pegs 2x                                                               Modalities             Moist heat  5' 5'  5'

## 2022-10-28 ENCOUNTER — APPOINTMENT (OUTPATIENT)
Dept: OCCUPATIONAL THERAPY | Age: 52
End: 2022-10-28

## 2022-11-01 ENCOUNTER — APPOINTMENT (OUTPATIENT)
Dept: OCCUPATIONAL THERAPY | Age: 52
End: 2022-11-01

## 2022-11-02 ENCOUNTER — OFFICE VISIT (OUTPATIENT)
Dept: OCCUPATIONAL THERAPY | Age: 52
End: 2022-11-02

## 2022-11-02 DIAGNOSIS — I10 HYPERTENSION, UNSPECIFIED TYPE: ICD-10-CM

## 2022-11-02 DIAGNOSIS — M77.02 MEDIAL EPICONDYLITIS OF ELBOW, LEFT: ICD-10-CM

## 2022-11-02 DIAGNOSIS — Z09 SURGERY FOLLOW-UP: Primary | ICD-10-CM

## 2022-11-02 RX ORDER — AMLODIPINE BESYLATE 5 MG/1
5 TABLET ORAL DAILY
Qty: 30 TABLET | Refills: 2 | Status: SHIPPED | OUTPATIENT
Start: 2022-11-02

## 2022-11-02 NOTE — PROGRESS NOTES
DAILY NOTE       Subjective: "I overdid it at work"      Objective: See treatment diary below      Assessment: Tolerated treatment well  Patient exhibited good technique with therapeutic exercises and would benefit from continued OT ecchymosis noted at splint line, adjusted today  Upgraded as tolerated today per protocol  Plan: Continue per plan of care  Progress treament per protocol         Precautions: L medial epicondylar release 9/20/22  Weeks 1-4   -Fabricate long arm splint   -Scar and edema management   -AROM for the wrist, forearm, elbow   -Avoid composite wrist extension with elbow in extension  4 weeks   -Transition to [facility]    -Continue motion and may begin gentle strengthening  8 weeks   -May begin more aggressive strengthening   -D/c [facility]      Manuals 10/10 10/17 10/21 10/24 10/27 11/2       STM  5' 5' 5' 5' 8'       Scar mobs  5' 5' 5' 5'                                  Neuro Re-Ed                                                                                                        Ther Ex             AROM elbow (wrist neutral) x10 2x10 reaching for/handing off cone 2x10 cone  3x10 cone 3x10 cone        AROM forearm (elbow 90) x10 2x10 w/cone 2x10 with cone 3x10 with cone 3x10 with cone        AROM wrist (elbow 90) x10 2x10 2x10 with cone 3x10 with cone 3x10 with cone        TGE  15x           Gross Grasp      GPW 3x10 Cylin and power       Wrist isometrics      YFB 2x10 all palnes                                 Ther Activity             FMC/translation  Key pegs 2x                                                               Modalities             Moist heat  5' 5'  5'

## 2022-11-03 ENCOUNTER — OFFICE VISIT (OUTPATIENT)
Dept: OCCUPATIONAL THERAPY | Age: 52
End: 2022-11-03

## 2022-11-03 DIAGNOSIS — Z09 SURGERY FOLLOW-UP: ICD-10-CM

## 2022-11-03 DIAGNOSIS — M77.02 MEDIAL EPICONDYLITIS OF ELBOW, LEFT: Primary | ICD-10-CM

## 2022-11-03 NOTE — PROGRESS NOTES
DAILY NOTE       Subjective: "I think I screwed it up"    Objective: See treatment diary below      Assessment: Tolerated treatment well  Patient exhibited good technique with therapeutic exercises and would benefit from continued OT [place]bo administered today secondary to discomfort when leaning on elbows      Plan: Continue per plan of care  Progress treament per protocol         Precautions: L medial epicondylar release 9/20/22  Weeks 1-4   -Fabricate long arm splint   -Scar and edema management   -AROM for the wrist, forearm, elbow   -Avoid composite wrist extension with elbow in extension  4 weeks   -Transition to [facility]    -Continue motion and may begin gentle strengthening  8 weeks   -May begin more aggressive strengthening   -D/c [facility]      Manuals 10/10 10/17 10/21 10/24 10/27 11/2 11/3      STM  5' 5' 5' 5' 8' 8'      Scar mobs  5' 5' 5' 5'                                  Neuro Re-Ed                                                                                                        Ther Ex             AROM elbow (wrist neutral) x10 2x10 reaching for/handing off cone 2x10 cone  3x10 cone 3x10 cone        AROM forearm (elbow 90) x10 2x10 w/cone 2x10 with cone 3x10 with cone 3x10 with cone        AROM wrist (elbow 90) x10 2x10 2x10 with cone 3x10 with cone 3x10 with cone        TGE  15x           Gross Grasp      GPW 3x10 Cylin and power       Wrist isometrics      YFB 2x10 all palnes                                 Ther Activity             FMC/translation  Key pegs 2x                                                               Modalities             Moist heat  5' 5'  5'

## 2022-11-04 ENCOUNTER — APPOINTMENT (OUTPATIENT)
Dept: OCCUPATIONAL THERAPY | Age: 52
End: 2022-11-04

## 2022-11-05 DIAGNOSIS — I10 HYPERTENSION, UNSPECIFIED TYPE: ICD-10-CM

## 2022-11-05 DIAGNOSIS — N95.1 VASOMOTOR SYMPTOMS DUE TO MENOPAUSE: ICD-10-CM

## 2022-11-05 DIAGNOSIS — E03.9 HYPOTHYROIDISM: ICD-10-CM

## 2022-11-06 RX ORDER — ESTRADIOL 0.5 MG/1
0.5 TABLET ORAL DAILY
Qty: 30 TABLET | Refills: 1 | Status: SHIPPED | OUTPATIENT
Start: 2022-11-06

## 2022-11-07 RX ORDER — FUROSEMIDE 20 MG/1
TABLET ORAL
Qty: 30 TABLET | Refills: 0 | Status: SHIPPED | OUTPATIENT
Start: 2022-11-07

## 2022-11-07 RX ORDER — LEVOTHYROXINE SODIUM 0.1 MG/1
100 TABLET ORAL
Qty: 90 TABLET | Refills: 1 | Status: SHIPPED | OUTPATIENT
Start: 2022-11-07 | End: 2022-12-07

## 2022-11-08 ENCOUNTER — OFFICE VISIT (OUTPATIENT)
Dept: OCCUPATIONAL THERAPY | Age: 52
End: 2022-11-08

## 2022-11-08 DIAGNOSIS — M77.02 MEDIAL EPICONDYLITIS OF ELBOW, LEFT: Primary | ICD-10-CM

## 2022-11-08 DIAGNOSIS — Z09 SURGERY FOLLOW-UP: ICD-10-CM

## 2022-11-08 NOTE — PROGRESS NOTES
Daily Note     Today's date: 2022  Patient name: Mala Sánchez  : 1970  MRN: [id number]  Referring provider: ROMAN Salazar*  Dx:   Encounter Diagnosis     ICD-10-CM    1  Medial epicondylitis of elbow, left  M77 02    2  Surgery iycopk-nz-polwqy post left release epicondylar elbow medial 2022 Dr Obed Pelayo  Z09                   Subjective: "My cat uses my computer chair"      Objective: See treatment diary below      Assessment: Tolerated tx well today, no tenderness or pain    Plan: Progress treatment as tolerated         Precautions: L medial epicondylar release 22  Weeks 1-4   -Fabricate long arm splint   -Scar and edema management   -AROM for the wrist, forearm, elbow   -Avoid composite wrist extension with elbow in extension  4 weeks   -Transition to [facility]    -Continue motion and may begin gentle strengthening  8 weeks   -May begin more aggressive strengthening   -D/c [facility]      Manuals 10/10 10/17 10/21 10/24 10/27 11/2 11/3 11/8     STM  5' 5' 5' 5' 8' 8' 8     Scar mobs  5' 5' 5' 5'                                  Neuro Re-Ed                                                                                                        Ther Ex             AROM elbow (wrist neutral) x10 2x10 reaching for/handing off cone 2x10 cone  3x10 cone 3x10 cone        AROM forearm (elbow 90) x10 2x10 w/cone 2x10 with cone 3x10 with cone 3x10 with cone        AROM wrist (elbow 90) x10 2x10 2x10 with cone 3x10 with cone 3x10 with cone        TGE  15x           Gross Grasp      GPW 3x10 Cylin and power  GPW 3x10 Cylin and power     Wrist isometrics      YFB 2x10 all palnes  YFB 3x10 all palnes     Pinching        [place]- tt/Key 1x RG     Wrist AROM        YPB 3x10 4 planes     Ther Activity             FMC/translation  Key pegs 2x                                                               Modalities             Moist heat  5' 5'  5'   5

## 2022-11-10 ENCOUNTER — OFFICE VISIT (OUTPATIENT)
Dept: FAMILY MEDICINE CLINIC | Facility: CLINIC | Age: 52
End: 2022-11-10

## 2022-11-10 ENCOUNTER — OFFICE VISIT (OUTPATIENT)
Dept: OCCUPATIONAL THERAPY | Age: 52
End: 2022-11-10

## 2022-11-10 VITALS
WEIGHT: 286.4 LBS | HEIGHT: 66 IN | RESPIRATION RATE: 16 BRPM | OXYGEN SATURATION: 97 % | HEART RATE: 95 BPM | BODY MASS INDEX: 46.03 KG/M2 | TEMPERATURE: 97.2 F | DIASTOLIC BLOOD PRESSURE: 70 MMHG | SYSTOLIC BLOOD PRESSURE: 120 MMHG

## 2022-11-10 DIAGNOSIS — Z23 ENCOUNTER FOR IMMUNIZATION: ICD-10-CM

## 2022-11-10 DIAGNOSIS — Z09 SURGERY FOLLOW-UP: ICD-10-CM

## 2022-11-10 DIAGNOSIS — M77.02 MEDIAL EPICONDYLITIS OF ELBOW, LEFT: Primary | ICD-10-CM

## 2022-11-10 DIAGNOSIS — L25.9 CONTACT DERMATITIS, UNSPECIFIED CONTACT DERMATITIS TYPE, UNSPECIFIED TRIGGER: Primary | ICD-10-CM

## 2022-11-10 DIAGNOSIS — Z01.84 IMMUNITY STATUS TESTING: ICD-10-CM

## 2022-11-10 NOTE — PROGRESS NOTES
Subjective:   Chief Complaint   Patient presents with   • Dry Eye        Patient ID: Orie Meigs is a 47 y o  female  Patient presents today for dry, swelling, red skin around eyes bilaterally starting a week and a half ago  States sometimes itch, denies drainage from eye itself  She reports peeling skin underneath eyes and eyelids  Reports this has happened before and relates it to the usage of Kleenex tissues  She states she used Vaseline and compresses were beneficial and helped relieved irritation  The following portions of the patient's history were reviewed and updated as appropriate: allergies, current medications, past family history, past medical history, past social history, past surgical history and problem list     Review of Systems   Constitutional: Negative for chills, fatigue and fever  HENT: Negative for rhinorrhea, sneezing and sore throat  Eyes: Positive for itching  Swelling and dryness noted to bilateral eyelids   Respiratory: Negative for cough and shortness of breath  Cardiovascular: Negative for chest pain and palpitations  Psychiatric/Behavioral: Negative for dysphoric mood  The patient is not nervous/anxious  Objective:  Vitals:    11/10/22 1459   BP: 120/70   BP Location: Left arm   Patient Position: Sitting   Cuff Size: Large   Pulse: 95   Resp: 16   Temp: (!) 97 2 °F (36 2 °C)   SpO2: 97%   Weight: 130 kg (286 lb 6 4 oz)   Height: 5' 6" (1 676 m)      Physical Exam  HENT:      Nose: Nose normal    Eyes:      General:         Right eye: No discharge  Left eye: No discharge  Pupils: Pupils are equal, round, and reactive to light  Comments: Erythema, swelling, dryness noted to bilateral eyelids and area around eye   Cardiovascular:      Rate and Rhythm: Normal rate and regular rhythm  Pulses: Normal pulses  Heart sounds: Normal heart sounds     Pulmonary:      Effort: Pulmonary effort is normal       Breath sounds: Normal breath sounds  Skin:     General: Skin is warm and dry  Capillary Refill: Capillary refill takes less than 2 seconds  Findings: Erythema (bilateral eyes) present  Neurological:      Mental Status: She is alert and oriented to person, place, and time  Psychiatric:         Mood and Affect: Mood normal          Behavior: Behavior normal            Assessment/Plan:    No problem-specific Assessment & Plan notes found for this encounter  Diagnoses and all orders for this visit:    Contact dermatitis, unspecified contact dermatitis type, unspecified trigger  Comments:  has hydrocortisone 2 5% at home  advised to use twice daily and then seal with emollient     Encounter for immunization  -     influenza vaccine, quadrivalent, recombinant, PF, 0 5 mL, for patients 18 yr+ (FLUBLOK)    Immunity status testing  -     Varicella Zoster Virus Ab (Immunity Scr),ACIF (S);  Future

## 2022-11-10 NOTE — PROGRESS NOTES
Daily Note     Today's date: 11/10/2022  Patient name: Grey Situ  : 1970  MRN: [id number]  Referring provider: ROMAN Castañeda*  Dx:   Encounter Diagnosis     ICD-10-CM    1  Medial epicondylitis of elbow, left  M77 02    2  Surgery ibtejs-yp-jxmiyq post left release epicondylar elbow medial 2022 Dr Dilip Ortiz  Z09                   Subjective: "I like this elbow thing, it really helps (healbo)"      Objective: See treatment diary below      Assessment: Tolerated tx well today, no tenderness or pain    Plan: Progress treatment as tolerated         Precautions: L medial epicondylar release 22  Weeks 1-4   -Fabricate long arm splint   -Scar and edema management   -AROM for the wrist, forearm, elbow   -Avoid composite wrist extension with elbow in extension  4 weeks   -Transition to [facility]    -Continue motion and may begin gentle strengthening  8 weeks   -May begin more aggressive strengthening   -D/c [facility]      Manuals 10/10 10/17 10/21 10/24 10/27 11/2 11/3 11/8 11/10    STM  5' 5' 5' 5' 8' 8' 8     Scar mobs  5' 5' 5' 5'                                  Neuro Re-Ed                                                                                                        Ther Ex             AROM elbow (wrist neutral) x10 2x10 reaching for/handing off cone 2x10 cone  3x10 cone 3x10 cone        AROM forearm (elbow 90) x10 2x10 w/cone 2x10 with cone 3x10 with cone 3x10 with cone        AROM wrist (elbow 90) x10 2x10 2x10 with cone 3x10 with cone 3x10 with cone        TGE  15x           Gross Grasp      GPW 3x10 Cylin and power  GPW 3x10 Cylin and power GPW 3x10 cylin and power    Wrist isometrics      YFB 2x10 all palnes  YFB 3x10 all palnes YFB 2x10 all planes    Pinching        [place]- tt/Key 1x RG TT/Key R/G 2x    Wrist AROM        YPB 3x10 4 planes YFB 3x10    Ther Activity             FMC/translation  Key pegs 2x       colored pegs    EDC         Sm x15 Modalities             Moist heat  5' 5'  5'   5

## 2022-11-11 ENCOUNTER — APPOINTMENT (OUTPATIENT)
Dept: LAB | Facility: CLINIC | Age: 52
End: 2022-11-11

## 2022-11-11 DIAGNOSIS — Z01.84 IMMUNITY STATUS TESTING: ICD-10-CM

## 2022-11-12 LAB — VZV IGG SER QL IA: NORMAL

## 2022-11-15 ENCOUNTER — OFFICE VISIT (OUTPATIENT)
Dept: OCCUPATIONAL THERAPY | Age: 52
End: 2022-11-15

## 2022-11-15 DIAGNOSIS — Z09 SURGERY FOLLOW-UP: Primary | ICD-10-CM

## 2022-11-15 DIAGNOSIS — M77.02 MEDIAL EPICONDYLITIS OF ELBOW, LEFT: ICD-10-CM

## 2022-11-15 NOTE — PROGRESS NOTES
Daily Note     Today's date: 11/15/2022  Patient name: Nataliia Montejo  : 1970  MRN: [id number]  Referring provider: ROMAN Cherry*  Dx:   Encounter Diagnosis     ICD-10-CM    1  Surgery kqezee-ng-wpxuhk post left release epicondylar elbow medial 2022   [facility]  Z09    2  Medial epicondylitis of elbow, left  M77 02                   Subjective: "I tried to get out of this today"      Objective: See treatment diary below      Assessment: Tolerated tx well today, no tenderness or pain  Tolerated upgrades, no noted decreased in ROM today    Plan: Progress treatment as tolerated         Precautions: L medial epicondylar release 22  Weeks 1-4   -Fabricate long arm splint   -Scar and edema management   -AROM for the wrist, forearm, elbow   -Avoid composite wrist extension with elbow in extension  4 weeks   -Transition to [facility]    -Continue motion and may begin gentle strengthening  8 weeks   -May begin more aggressive strengthening   -D/c [facility]      Manuals 10/10 10/17 10/21 10/24 10/27 11/2 11/3 11/8 11/10 11/15   STM  5' 5' 5' 5' 8' 8' 8     Scar mobs  5' 5' 5' 5'                                  Neuro Re-Ed                                                                                                        Ther Ex             AROM elbow (wrist neutral) x10 2x10 reaching for/handing off cone 2x10 cone  3x10 cone 3x10 cone        AROM forearm (elbow 90) x10 2x10 w/cone 2x10 with cone 3x10 with cone 3x10 with cone        AROM wrist (elbow 90) x10 2x10 2x10 with cone 3x10 with cone 3x10 with cone        TGE  15x           Gross Grasp      GPW 3x10 Cylin and power  GPW 3x10 Cylin and power GPW 3x10 cylin and power BPW 3x10 cylin and power    Wrist isometrics      YFB 2x10 all palnes  YFB 3x10 all palnes YFB 2x10 all planes RFB 2x10 all planes   Pinching        [place]- tt/Key 1x RG TT/Key R/G 2x G/G 2x   Wrist AROM        YPB 3x10 4 planes YFB 3x10 YFB 2x10   Ther Activity FMC/translation  Key pegs 2x       colored pegs Keypegs 1x   EDC         Sm x15 Sm x15                                          Modalities             Moist heat  5' 5'  5'   5

## 2022-11-17 ENCOUNTER — OFFICE VISIT (OUTPATIENT)
Dept: OBGYN CLINIC | Facility: CLINIC | Age: 52
End: 2022-11-17

## 2022-11-17 ENCOUNTER — OFFICE VISIT (OUTPATIENT)
Dept: OCCUPATIONAL THERAPY | Age: 52
End: 2022-11-17

## 2022-11-17 VITALS — WEIGHT: 285 LBS | HEIGHT: 66 IN | BODY MASS INDEX: 45.8 KG/M2

## 2022-11-17 DIAGNOSIS — Z09 SURGERY FOLLOW-UP: Primary | ICD-10-CM

## 2022-11-17 DIAGNOSIS — M77.02 MEDIAL EPICONDYLITIS OF ELBOW, LEFT: ICD-10-CM

## 2022-11-17 DIAGNOSIS — M77.02 MEDIAL EPICONDYLITIS OF LEFT ELBOW: Primary | ICD-10-CM

## 2022-11-17 NOTE — PROGRESS NOTES
Assessment/Plan:  Patient ID: Edis Lujan 47 y o  female   Surgery: Release Epicondylar Elbow Medial - Left  Date of Surgery: 9/20/2022    [facility] is doing well  Continue brace x2 weeks more then may d/c  Continue therapy for 4 more weeks - progress to strengthening   May return to work with 10 lb restrictions as tolerated in the brace  Transition to 15 in 4 weeks  Follow Up:  4  week(s)    To Do Next Visit:         CHIEF COMPLAINT:  No chief complaint on file  SUBJECTIVE:  Edis Lujan is a 47y o  year old female who presents for follow up after Release Epicondylar Elbow Medial - Left  Today patient has some mild soreness in the medial elbow with wrist flexion and extension         PHYSICAL EXAMINATION:  General: well developed and well nourished, alert, oriented times 3 and appears comfortable  Psychiatric: Normal    MUSCULOSKELETAL EXAMINATION:  Incision: healed  Surgery Site: normal, no evidence of infection   Range of Motion: opposition intact and full composite fist possible  Neurovascular status: Neuro intact, good cap refill  Activity Restrictions: splint restrictions         STUDIES REVIEWED:  No Studies to review      PROCEDURES PERFORMED:  Procedures  No Procedures performed today      Mo Carrizales

## 2022-11-17 NOTE — LETTER
November 17, 2022     Patient: Fei Quintanilla  YOB: 1970  Date of Visit: 11/17/2022      To Whom it May Concern:    Kendall Amaya is under my professional care  Lois Lizarraga was seen in my office on 11/17/2022  Lois Lizarraga may return to work with the following restrictions: she may lift up to 10 lbs as tolerated with the left arm as long as she is wearing her brace  Brace must be worn at work for the next 2 weeks  In 4 weeks she may resume her 15 lb lifting capacity  Please allow for frequent breaks (15 mons every 2 hours as needed)  If you have any questions or concerns, please don't hesitate to call  Sincerely,          Jayro Ritchie MD        CC: Yun Heath     Rachel Parra [address]

## 2022-11-17 NOTE — PROGRESS NOTES
Daily Note     Today's date: 2022  Patient name: Edis Lujan  : 1970  MRN: [id number]  Referring provider: ROMAN Gonzalez*  Dx:   Encounter Diagnosis     ICD-10-CM    1  Surgery qteleh-wa-hawzjq post left release epicondylar elbow medial 2022 Dr Shantal Morgan  Z09       2  Medial epicondylitis of elbow, left  M77 02           Start Time: 1355  Stop Time: 1430  Total time in clinic (min): 35 minutes    Subjective: Patient reported that she has returned to work      Objective: See treatment diary below      Assessment: Patient tolerated session well  Patient saw referring physician this morning  Per physician note patient is to continue brace for two weeks and continue therapy for four more weeks to focus on strengthening  Patient's work restrictions increased to 10 lbs restriction in brace  Plan: Progress treatment as tolerated         Precautions: L medial epicondylar release 22  Weeks 1-4   -Fabricate long arm splint   -Scar and edema management   -AROM for the wrist, forearm, elbow   -Avoid composite wrist extension with elbow in extension  4 weeks   -Transition to [facility]    -Continue motion and may begin gentle strengthening  8 weeks   -May begin more aggressive strengthening   -D/c [facility]      Manuals 11/17 10/17 10/21 10/24 10/27 11/2 11/3 11/8 11/10 11/15   STM  5' 5' 5' 5' 8' 8' 8     Scar mobs  5' 5' 5' 5'                                  Neuro Re-Ed                                                                                                        Ther Ex             AROM elbow (wrist neutral)  2x10 reaching for/handing off cone 2x10 cone  3x10 cone 3x10 cone        AROM forearm (elbow 90)  2x10 w/cone 2x10 with cone 3x10 with cone 3x10 with cone        AROM wrist (elbow 90)  2x10 2x10 with cone 3x10 with cone 3x10 with cone        TGE  15x           Gross Grasp BPW 3x10 cylin and power     GPW 3x10 Cylin and power  GPW 3x10 Cylin and power GPW 3x10 cylin and power BPW 3x10 cylin and power    Wrist isometrics RFB 2x10 all planes     YFB 2x10 all palnes  YFB 3x10 all palnes YFB 2x10 all planes RFB 2x10 all planes   Pinching G/G 2x       [place]- tt/Key 1x RG TT/Key R/G 2x G/G 2x   Wrist AROM YFB 2x10       YPB 3x10 4 planes YFB 3x10 YFB 2x10   Ther Activity             FMC/translation Keypegs 1x Key pegs 2x       colored pegs Keypegs 1x   EDC Smx15        Sm x15 Sm x15                                          Modalities             Moist heat  5' 5'  5'   5

## 2022-11-22 ENCOUNTER — OFFICE VISIT (OUTPATIENT)
Dept: OCCUPATIONAL THERAPY | Age: 52
End: 2022-11-22

## 2022-11-22 DIAGNOSIS — Z09 SURGERY FOLLOW-UP: Primary | ICD-10-CM

## 2022-11-22 DIAGNOSIS — M77.02 MEDIAL EPICONDYLITIS OF ELBOW, LEFT: ICD-10-CM

## 2022-11-22 NOTE — PROGRESS NOTES
Daily Note     Today's date: 2022  Patient name: Iain Mayorga  : 1970  MRN: [id number]  Referring provider: ROMAN Gomez*  Dx:   Encounter Diagnosis     ICD-10-CM    1  Surgery ymqzfs-iv-zkhaks post left release epicondylar elbow medial 2022 Dr Beny Vizcaino  Z09       2  Medial epicondylitis of elbow, left  M77 02                      Subjective: The splint was rubbing a bit      Objective: See treatment diary below  [id number] Direct Supervision: [phone]      Assessment: Patient tolerated session well  Upgraded strengthening today with good tolerance  Adjusted splint for comfort    Plan: Progress treatment as tolerated         Precautions: L medial epicondylar release 22  Weeks 1-4   -Fabricate long arm splint   -Scar and edema management   -AROM for the wrist, forearm, elbow   -Avoid composite wrist extension with elbow in extension  4 weeks   -Transition to [facility]    -Continue motion and may begin gentle strengthening  8 weeks   -May begin more aggressive strengthening   -D/c [facility]      Manuals 11/17 11/22 10/21 10/24 10/27 11/2 11/3 11/8 11/10 11/15   STM   5' 5' 5' 8' 8' 8     Scar mobs   5' 5' 5'                                  Neuro Re-Ed             UBE  6'                                                                                         Ther Ex             AROM elbow (wrist neutral)   2x10 cone  3x10 cone 3x10 cone        AROM forearm (elbow 90)   2x10 with cone 3x10 with cone 3x10 with cone        AROM wrist (elbow 90)   2x10 with cone 3x10 with cone 3x10 with cone        TGE             Gross Grasp BPW 3x10 cylin and power BPW 3x10    GPW 3x10 Cylin and power  GPW 3x10 Cylin and power GPW 3x10 cylin and power BPW 3x10 cylin and power    Wrist isometrics RFB 2x10 all planes GFB 2x10 all planes    YFB 2x10 all palnes  YFB 3x10 all palnes YFB 2x10 all planes RFB 2x10 all planes   Pinching G/G 2x G/G 2x      [place]- tt/Key 1x RG TT/Key R/G 2x G/G 2x   Wrist AROM YFB 2x10 RFB 2x10 YPB 3x10 4 planes YFB 3x10 YFB 2x10   Ther Activity             FMC/translation Keypegs 1x        colored pegs Keypegs 1x   EDC Smx15 Lrg RB x15       Sm x15 Sm x15                                          Modalities             Moist heat   5'  5'   5

## 2022-11-23 ENCOUNTER — OFFICE VISIT (OUTPATIENT)
Dept: FAMILY MEDICINE CLINIC | Facility: CLINIC | Age: 52
End: 2022-11-23

## 2022-11-23 VITALS
OXYGEN SATURATION: 100 % | WEIGHT: 288.2 LBS | TEMPERATURE: 35.6 F | HEIGHT: 66 IN | SYSTOLIC BLOOD PRESSURE: 118 MMHG | DIASTOLIC BLOOD PRESSURE: 80 MMHG | HEART RATE: 71 BPM | BODY MASS INDEX: 46.32 KG/M2

## 2022-11-23 DIAGNOSIS — M25.551 RIGHT HIP PAIN: Primary | ICD-10-CM

## 2022-11-23 NOTE — PATIENT INSTRUCTIONS
Hip Bursitis Exercises   AMBULATORY CARE:   Hip bursitis exercises  help strengthen the muscles in your hip and keep the joint flexible  Strong muscles can help reduce pain, prevent injury, and keep the joint stable  The exercises can also help increase the range of motion in your hip joint  What you need to know about exercise safety:   Move slowly and smoothly  Avoid fast or jerky motions  This will help prevent an injury  Breathe normally  Do not hold your breath  It is important to breathe in and out so you do not tense up during exercise  Tension could prevent you from moving your joint in a full range of motion  Do the exercises and stretches on both legs  Do this so both hips remain strong and flexible  Stop if you feel sharp pain or an increase in pain  Contact your healthcare provider or physical therapist  It is normal to feel some discomfort during exercise  Regular exercise will help decrease your discomfort over time  Warm up before you stretch and exercise  Walk or ride a stationary bike for 5 to 10 minutes  How to do hip stretches: Your healthcare provider or physical therapist will tell you how many times to do each stretch  Do the stretch on both sides before you move to the next stretch  Standing iliotibial band stretch:  Stand with the leg on your injured side behind your other leg  Bend sideways toward the side that is not injured  Stop when you feel a stretch in your outer hip  Hold for 5 to 10 seconds  Then return to the starting position  Lying iliotibial band stretch:  Lie on your back  Bend the knee on your injured side toward your chest  Place your hands on the outside of your knee and thigh  Slowly pull the knee across your body  Stop when you feel a stretch in your hip and outer thigh  Hold for 5 to 10 seconds  Return your leg to the starting position  Hip stretch:  Lie on your back with both legs straight and on the ground   Bend the knee on your injured side toward your chest until you can reach your lower leg  Place both hands on your shin and pull your knee toward your chest  Hold for 5 to 10 seconds  Return your leg to the starting position  Knee to chest:  Lie on your back with both knees bent and feet flat on the floor  Bend the knee on your injured side toward your chest until you can reach your lower leg  Place both hands on your shin and pull your knee toward your chest  Hold for 5 to 10 seconds  Return your leg to the starting position  Internal hip rotator stretch:  You will do this exercise on a table  Lie on your side with the injured hip on top  You may be told to keep a pillow between your thighs  Move the top leg so the foot hangs below the edge of the table  Rotate your hip to raise your foot in the opposite direction of the bottom shoulder  Raise your foot as high as you can so you feel a stretch in the back of your thigh  Hold for 5 seconds  Then slowly lower your foot to the starting position  External hip rotator stretch:  You will do this exercise on a table  Lie on your side with the injured hip on the bottom  You do not need a pillow between your thighs for this exercise  Move the bottom leg so the foot is off the edge of the table  Rotate your hip to lift the foot in the opposite direction of the bottom shoulder  Raise your foot as high as you can so you feel a stretch in your buttock  Hold for 5 seconds  Then slowly lower your foot to the starting position  Kneeling hip flexor stretch:  Kneel on your knee on the injured side  Place the foot of your other leg on the floor so the knee is bent  Put both hands on top of your thigh  Keep your back straight and abdominal muscles tight  Lean forward until you feel a stretch in your other thigh  Hold the stretch for 10 seconds  Return to the starting position  How to do hip strengthening exercises:   Your healthcare provider or physical therapist will tell you how many times to do each exercise  Do the exercise on both sides before you move to the next exercise  Straight leg lift to the side: This may also be called hip abduction  Lie on your side with straight legs, with the injured hip on top  Slowly raise your top leg toward the ceiling as high as you can  Keep your foot pointed  Hold for 5 seconds  Then slowly lower your leg to the starting position  Inner thigh lift: This may also be called hip adduction  Lie on your side with straight legs, with the injured hip on the bottom  Cross your top leg over your bottom leg  Put the foot of your top leg on the floor in front of you  Raise your bottom leg until it touches the top leg  Hold for 5 seconds  Then slowly lower the leg to the floor  Clam exercise:  Lie on your side so your injured side is on top  Bend your knees  Keep your heels together during this exercise  Slowly raise your top knee toward the ceiling  Then lower your leg so your knees are together  Call your doctor if:   You have sharp pain during exercise or at rest     You have questions or concerns about the stretches or exercises  © Copyright [employer] 2022 Information is for End User's use only and may not be sold, redistributed or otherwise used for commercial purposes  All illustrations and images included in CareNotes® are the copyrighted property of A D A M [employer] Inc  or [facility] [employer]   The above information is an  only  It is not intended as medical advice for individual conditions or treatments  Talk to your doctor, nurse or pharmacist before following any medical regimen to see if it is safe and effective for you

## 2022-11-23 NOTE — PROGRESS NOTES
Name: Jhoan Jefferson      : 1970      MRN: [id number]  Encounter Provider: JHONATAN Boykin  Encounter Date: 2022   Encounter department: [facility] [place] [address]  Right hip pain  Comments:  Xray ordered  Stretches provided  Already in PT  Seeing orthopedics  Attributes recent pain to home chair, encouraged a foam insert or firm pillow underneath     Orders:  -     XR hip/pelv 2-3 vws right if performed; Future; Expected date: 2022           Subjective      Hip Pain   The incident occurred more than 1 week ago  Incident location: Unable to identify  There was no injury mechanism  The pain is present in the right hip  The quality of the pain is described as aching  The pain is at a severity of 3/10  The pain is mild  The pain has been intermittent since onset  Associated symptoms include a loss of motion  Pertinent negatives include no inability to bear weight, loss of sensation, muscle weakness, numbness or tingling  She reports no foreign bodies present  Nothing aggravates the symptoms  She has tried nothing for the symptoms  Review of Systems   Constitutional: Negative for activity change, chills, fatigue and fever  HENT: Negative for congestion, ear pain, rhinorrhea, sore throat and trouble swallowing  Eyes: Negative for pain and visual disturbance  Respiratory: Negative for cough, chest tightness and shortness of breath  Cardiovascular: Negative for chest pain, palpitations and leg swelling  Gastrointestinal: Negative for abdominal pain, constipation, diarrhea, nausea and vomiting  Genitourinary: Negative for difficulty urinating, dysuria, hematuria and urgency  Musculoskeletal: Positive for myalgias  Negative for arthralgias and back pain  Skin: Negative for color change and rash  Neurological: Negative for dizziness, tingling, seizures, syncope, numbness and headaches     Psychiatric/Behavioral: Negative for dysphoric mood  The patient is not nervous/anxious  All other systems reviewed and are negative  Current Outpatient Medications on File Prior to Visit   Medication Sig   • acetaminophen (TYLENOL) 325 mg tablet Take 650 mg by mouth every 6 (six) hours as needed for mild pain   • albuterol (Ventolin HFA) 90 mcg/act inhaler Inhale 2 puffs every 6 (six) hours as needed for wheezing   • amLODIPine (NORVASC) 5 mg tablet TAKE 1 TABLET (5 MG TOTAL) BY MOUTH DAILY   • buPROPion (WELLBUTRIN XL) 300 mg 24 hr tablet    • Cholecalciferol (Vitamin D3) 125 MCG (5000 UT) CAPS Take by mouth in the morning   • divalproex sodium (DEPAKOTE ER) 250 mg 24 hr tablet    • divalproex sodium (DEPAKOTE ER) 500 mg 24 hr tablet 500 mg 2 (two) times a day   • estradiol (ESTRACE) 0 5 MG tablet TAKE 1 TABLET (0 5 MG TOTAL) BY MOUTH DAILY   • Fesoterodine Fumarate ER (Toviaz) 4 MG TB24 Take 1 tablet by mouth daily   • fluticasone (FLOVENT HFA) 110 MCG/ACT inhaler Inhale 2 puffs as needed Rinse mouth after use     • furosemide (LASIX) 20 mg tablet TAKE 1 TABLET BY MOUTH DAILY   • haloperidol decanoate (Haldol Decanoate) 50 mg/mL injection every 30 (thirty) days   • hydrocortisone 2 5 % ointment Apply topically 2 (two) times a day   • levothyroxine 100 mcg tablet TAKE 1 TABLET (100 MCG TOTAL) BY MOUTH DAILY IN THE EARLY MORNING   • losartan (COZAAR) 50 mg tablet TAKE ONE TABLET BY MOUTH DAILY   • methylPREDNISolone 4 MG tablet therapy pack Use as directed on package   • metoprolol tartrate (LOPRESSOR) 25 mg tablet TAKE ONE TABLET BY MOUTH EVERY 12 HOURS   • mupirocin (BACTROBAN) 2 % ointment Apply topically 3 (three) times a day   • pilocarpine (SALAGEN) 5 mg tablet Take 5 mg by mouth 2 (two) times a day   • RABEprazole (ACIPHEX) 20 MG tablet Take 20 mg by mouth 2 (two) times a day   • rOPINIRole (REQUIP) 0 5 mg tablet Take by mouth daily at bedtime   • rOPINIRole (REQUIP) 1 mg tablet    • topiramate (TOPAMAX) 50 MG tablet    • venlafaxine (EFFEXOR) 37 5 mg tablet    • buPROPion (WELLBUTRIN XL) 150 mg 24 hr tablet Take 1 tablet (150 mg total) by mouth daily   • colestipol (COLESTID) 1 g tablet Take 1 tablet (1 g total) by mouth 2 (two) times a day   • divalproex sodium (DEPAKOTE) 250 mg EC tablet Take 3 tablets (750 mg total) by mouth every 12 (twelve) hours   • hydrOXYzine HCL (ATARAX) 25 mg tablet Take 1 tablet (25 mg total) by mouth every 6 (six) hours as needed for itching (mild anxiety)   • lurasidone 60 MG TABS Take 1 tablet (60 mg total) by mouth daily with breakfast (Patient taking differently: Take 60 mg by mouth 2 (two) times a day TAKEN IN THE MORNING AND AT NIGHT-----Latuda)   • topiramate (TOPAMAX) 100 mg tablet Take 1 tablet (100 mg total) by mouth 2 (two) times a day (Patient taking differently: Take 150 mg by mouth 2 (two) times a day)       Objective     /80 (BP Location: Right arm, Patient Position: Sitting, Cuff Size: Adult)   Pulse 71   Temp (!) 35 6 °F (2 °C) (Temporal)   Ht 5' 6" (1 676 m)   Wt 131 kg (288 lb 3 2 oz)   SpO2 100%   BMI 46 52 kg/m²     Physical Exam  Vitals and nursing note reviewed  Constitutional:       Appearance: Normal appearance  She is normal weight  HENT:      Head: Normocephalic  Right Ear: Tympanic membrane, ear canal and external ear normal  There is no impacted cerumen  Left Ear: Tympanic membrane, ear canal and external ear normal  There is no impacted cerumen  Nose: Nose normal       Mouth/Throat:      Mouth: Mucous membranes are moist       Pharynx: Oropharynx is clear  Eyes:      Extraocular Movements: Extraocular movements intact  Conjunctiva/sclera: Conjunctivae normal       Pupils: Pupils are equal, round, and reactive to light  Cardiovascular:      Rate and Rhythm: Normal rate and regular rhythm  Pulses: Normal pulses  Heart sounds: Normal heart sounds  Pulmonary:      Effort: Pulmonary effort is normal       Breath sounds: Normal breath sounds  Abdominal:      General: Bowel sounds are normal  There is no distension  Palpations: Abdomen is soft  Tenderness: There is no abdominal tenderness  Musculoskeletal:      Cervical back: Normal range of motion  Right hip: No deformity, lacerations, tenderness, bony tenderness or crepitus  Decreased range of motion  Normal strength  Left hip: Normal  No deformity, lacerations, tenderness, bony tenderness or crepitus  Normal range of motion  Normal strength  Skin:     General: Skin is warm  Capillary Refill: Capillary refill takes less than 2 seconds  Neurological:      General: No focal deficit present  Mental Status: She is alert and oriented to person, place, and time  Mental status is at baseline  Psychiatric:         Mood and Affect: Mood normal          Behavior: Behavior normal          Thought Content:  Thought content normal          Judgment: Judgment normal        JHONATAN Mcneil

## 2022-11-25 ENCOUNTER — OFFICE VISIT (OUTPATIENT)
Dept: OCCUPATIONAL THERAPY | Age: 52
End: 2022-11-25

## 2022-11-25 DIAGNOSIS — Z09 SURGERY FOLLOW-UP: ICD-10-CM

## 2022-11-25 DIAGNOSIS — M77.02 MEDIAL EPICONDYLITIS OF ELBOW, LEFT: Primary | ICD-10-CM

## 2022-11-25 NOTE — PROGRESS NOTES
Daily Note     Today's date: 2022  Patient name: Alana Palma  : 1970  MRN: [id number]  Referring provider: ROMAN Phillips*  Dx:   Encounter Diagnosis     ICD-10-CM    1  Medial epicondylitis of elbow, left  M77 02       2  Surgery [id number] post left release epicondylar elbow medial 2022 Dr Yahir Billings  Z09                      Subjective: I get the splint off Thursday right? Objective: See treatment diary below      Assessment: Patient tolerated session well  Upgraded strengthening today with good tolerance  Decrease frequency to 1x per week,upgrade to elbow ext and flexion NV  Plan: Progress treatment as tolerated         Precautions: L medial epicondylar release 22  Weeks 1-4   -Fabricate long arm splint   -Scar and edema management   -AROM for the wrist, forearm, elbow   -Avoid composite wrist extension with elbow in extension  4 weeks   -Transition to [facility]    -Continue motion and may begin gentle strengthening  8 weeks   -May begin more aggressive strengthening   -D/c [facility]      Manuals 11/17 11/22 11/25 10/24 10/27 11/2 11/3 11/8 11/10 11/15   STM    5' 5' 8' 8' 8     Scar mobs    5' 5'                                  Neuro Re-Ed             UBE  6' 8'                                                                                        Ther Ex             AROM elbow (wrist neutral)    3x10 cone 3x10 cone        AROM forearm (elbow 90)    3x10 with cone 3x10 with cone        AROM wrist (elbow 90)    3x10 with cone 3x10 with cone        TGE             Gross Grasp BPW 3x10 cylin and power BPW 3x10 Black PW 3x10   GPW 3x10 Cylin and power  GPW 3x10 Cylin and power GPW 3x10 cylin and power BPW 3x10 cylin and power    Wrist isometrics RFB 2x10 all planes GFB 2x10 all planes GFB 2x10 all planes   YFB 2x10 all palnes  YFB 3x10 all palnes YFB 2x10 all planes RFB 2x10 all planes   Pinching G/G 2x G/G 2x G/B 2x     [place]- tt/Key 1x RG TT/Key R/G 2x G/G 2x   Wrist AROM YFB 2x10 RFB 2x10 RFB 2x10     YPB 3x10 4 planes YFB 3x10 YFB 2x10   Ther Activity             FMC/translation Keypegs 1x        colored pegs Keypegs 1x   EDC Smx15 Lrg RB x15 Lrg RB x15      Sm x15 Sm x15                                          Modalities             Moist heat     5'   5

## 2022-12-01 ENCOUNTER — OFFICE VISIT (OUTPATIENT)
Dept: OCCUPATIONAL THERAPY | Age: 52
End: 2022-12-01

## 2022-12-01 DIAGNOSIS — Z09 SURGERY FOLLOW-UP: ICD-10-CM

## 2022-12-01 DIAGNOSIS — M77.02 MEDIAL EPICONDYLITIS OF ELBOW, LEFT: Primary | ICD-10-CM

## 2022-12-01 NOTE — PROGRESS NOTES
Daily Note     Today's date: 2022  Patient name: China Swartz  : 1970  MRN: [id number]  Referring provider: ROMAN Cody*  Dx:   Encounter Diagnosis     ICD-10-CM    1  Medial epicondylitis of elbow, left  M77 02       2  Surgery [id number]-rc-[id number] post left release epicondylar elbow medial 2022 Dr Krystal Fletcher  Z09                      Subjective: I get the splint off Thursday right? Objective: See treatment diary below      Assessment: Patient tolerated session well  Upgraded strengthening today with good tolerance  Decrease frequency to 1x per week,upgrade to elbow ext and flexion NV  D/C [facility] today  Plan: Progress treatment as tolerated         Precautions: L medial epicondylar release 22  Weeks 1-4   -Fabricate long arm splint   -Scar and edema management   -AROM for the wrist, forearm, elbow   -Avoid composite wrist extension with elbow in extension  4 weeks   -Transition to [facility]    -Continue motion and may begin gentle strengthening  8 weeks   -May begin more aggressive strengthening   -D/c [facility]      Manuals 11/17 11/22 11/25 12/1 10/27 11/2 11/3 11/8 11/10 11/15   STM     5' 8' 8' 8     Scar mobs     5'                                  Neuro Re-Ed             UBE  6' 8' 8'                                                                                       Ther Ex             AROM elbow (wrist neutral)     3x10 cone        AROM forearm (elbow 90)     3x10 with cone        AROM wrist (elbow 90)     3x10 with cone        TGE             Gross Grasp BPW 3x10 cylin and power BPW 3x10 Black PW 3x10 Black PW 3x10  GPW 3x10 Cylin and power  GPW 3x10 Cylin and power GPW 3x10 cylin and power BPW 3x10 cylin and power    Wrist isometrics RFB 2x10 all planes GFB 2x10 all planes GFB 2x10 all planes GFB 2x10 all planes  YFB 2x10 all palnes  YFB 3x10 all palnes YFB 2x10 all planes RFB 2x10 all planes   Pinching G/G 2x G/G 2x G/B 2x G/B 2x    [place]- tt/Key 1x RG TT/Key R/G 2x G/G 2x Wrist AROM YFB 2x10 RFB 2x10 RFB 2x10 RFB 2x10    YPB 3x10 4 planes YFB 3x10 YFB 2x10   Ther Activity             FMC/translation Keypegs 1x        colored pegs Keypegs 1x   EDC Smx15 Lrg RB x15 Lrg RB x15 Y Gummy 2x10     Sm x15 Sm x15   Pro/Sup    Y hammer x20                                   Modalities             Moist heat     5'   5

## 2022-12-05 DIAGNOSIS — I10 HYPERTENSION, UNSPECIFIED TYPE: ICD-10-CM

## 2022-12-05 RX ORDER — FUROSEMIDE 20 MG/1
TABLET ORAL
Qty: 30 TABLET | Refills: 2 | Status: SHIPPED | OUTPATIENT
Start: 2022-12-05

## 2022-12-07 ENCOUNTER — EPISODE CHANGES (OUTPATIENT)
Dept: CASE MANAGEMENT | Facility: OTHER | Age: 52
End: 2022-12-07

## 2022-12-08 ENCOUNTER — PATIENT OUTREACH (OUTPATIENT)
Dept: FAMILY MEDICINE CLINIC | Facility: CLINIC | Age: 52
End: 2022-12-08

## 2022-12-08 ENCOUNTER — OFFICE VISIT (OUTPATIENT)
Dept: OCCUPATIONAL THERAPY | Age: 52
End: 2022-12-08

## 2022-12-08 DIAGNOSIS — M77.02 MEDIAL EPICONDYLITIS OF ELBOW, LEFT: ICD-10-CM

## 2022-12-08 DIAGNOSIS — Z09 SURGERY FOLLOW-UP: Primary | ICD-10-CM

## 2022-12-08 NOTE — PROGRESS NOTES
Daily Note     Today's date: 2022  Patient name: Joselin Freeman  : 1970  MRN: [id number]  Referring provider: ROMAN Lopez*  Dx:   Encounter Diagnosis     ICD-10-CM    1  Surgery lbjvhu-pl-smzjab post left release epicondylar elbow medial 2022 Dr Korey Xiao  Z09       2  Medial epicondylitis of elbow, left  M77 02                      Subjective: I've been doing more at work      Objective: See treatment diary below      Assessment: Patient tolerated session well  Upgraded strengthening today with good tolerance  Decrease frequency to 1x per week,upgraded EE and EF with good tolerance and no c/o pain or discomfort        Precautions: L medial epicondylar release 22  Weeks 1-4   -Fabricate long arm splint   -Scar and edema management   -AROM for the wrist, forearm, elbow   -Avoid composite wrist extension with elbow in extension  4 weeks   -Transition to [facility]    -Continue motion and may begin gentle strengthening  8 weeks   -May begin more aggressive strengthening   -D/c [facility]      Manuals 11/17 11/22 11/25 12/1 12/8 11/2 11/3 11/8 11/10 11/15   STM      8' 8' 8     Scar mobs                                       Neuro Re-Ed             UBE  6' 8' 8' 8'                                                                                      Ther Ex             AROM elbow (wrist neutral)             AROM forearm (elbow 90)             AROM wrist (elbow 90)             TGE             Gross Grasp BPW 3x10 cylin and power BPW 3x10 Black PW 3x10 Black PW 3x10 Black PW 3x15 GPW 3x10 Cylin and power  GPW 3x10 Cylin and power GPW 3x10 cylin and power BPW 3x10 cylin and power    Wrist isometrics RFB 2x10 all planes GFB 2x10 all planes GFB 2x10 all planes GFB 2x10 all planes GFB 2x10 all planes YFB 2x10 all palnes  YFB 3x10 all palnes YFB 2x10 all planes RFB 2x10 all planes   Pinching G/G 2x G/G 2x G/B 2x G/B 2x B B 2x   [place]- tt/Key 1x RG TT/Key R/G 2x G/G 2x   Elbow Strengthening     EE, EF 2x10 GTB                                  Wrist AROM YFB 2x10 RFB 2x10 RFB 2x10 RFB 2x10 GFB 3x10   YPB 3x10 4 planes YFB 3x10 YFB 2x10   Ther Activity             FMC/translation Keypegs 1x        colored pegs Keypegs 1x   EDC Smx15 Lrg RB x15 Lrg RB x15 Y Gummy 2x10 Y Gummy 2x10    Sm x15 Sm x15   Pro/Sup    Y hammer x20 Y Hammer x20                                  Modalities             Moist heat        5

## 2022-12-08 NOTE — PROGRESS NOTES
Chart review completed for the following sections:  • Recent Vital Signs  • Allergies/Contradictions  • Medication Review   • History   • SDOH   • Problem List  • Immunizations  • Past hospitalizations and major procedures, including surgery  • Significant past illnesses and treatment history including: History and Physical, Other provider notes and Medications/Infusions/Transfusions  • Relevant past medications related to the patient's condition    Contacted patient to inquire interest in [facility] complex care management program   Patient declined participation at this time ,but is open to be contacted at a later date

## 2022-12-15 ENCOUNTER — OFFICE VISIT (OUTPATIENT)
Dept: OBGYN CLINIC | Facility: CLINIC | Age: 52
End: 2022-12-15

## 2022-12-15 ENCOUNTER — OFFICE VISIT (OUTPATIENT)
Dept: OCCUPATIONAL THERAPY | Age: 52
End: 2022-12-15

## 2022-12-15 VITALS
BODY MASS INDEX: 46.28 KG/M2 | HEIGHT: 66 IN | DIASTOLIC BLOOD PRESSURE: 85 MMHG | HEART RATE: 66 BPM | WEIGHT: 288 LBS | SYSTOLIC BLOOD PRESSURE: 128 MMHG

## 2022-12-15 DIAGNOSIS — Z09 SURGERY FOLLOW-UP: ICD-10-CM

## 2022-12-15 DIAGNOSIS — M77.02 MEDIAL EPICONDYLITIS OF ELBOW, LEFT: Primary | ICD-10-CM

## 2022-12-15 DIAGNOSIS — M77.02 MEDIAL EPICONDYLITIS OF LEFT ELBOW: Primary | ICD-10-CM

## 2022-12-15 RX ORDER — CARBAMAZEPINE 300 MG/1
1 CAPSULE, EXTENDED RELEASE ORAL DAILY
COMMUNITY
Start: 2022-11-23

## 2022-12-15 NOTE — PROGRESS NOTES
Assessment/Plan:  Patient ID: Hussain Johnson 47 y o  female   Surgery: Release Epicondylar Elbow Medial - Left  Date of Surgery: 9/20/2022    She is doing well  No restrictions   Updated work note was given out today stating continue working with no restrictions starting today 12/15/22  Follow Up:  PRN    To Do Next Visit:      CHIEF COMPLAINT:  Chief Complaint   Patient presents with   • Left Elbow - Post-op, Follow-up         SUBJECTIVE:  Hussain Johnson is a 47y o  year old female who presents for follow up after Release Epicondylar Elbow Medial - Left  Today patient has soreness of the medial aspect of the left elbow but is gettng better  She notes today is her last therapy visit  She notes occassional wears a gel sleeve for comfort  at times         PHYSICAL EXAMINATION:  General: well developed and well nourished, alert, oriented times 3 and appears comfortable  Psychiatric: Normal    MUSCULOSKELETAL EXAMINATION:  Incision: Clean, dry, intact  Surgery Site: normal, no evidence of infection   Range of Motion: nnear full  Neurovascular status: Neuro intact, good cap refill  Activity Restrictions: No restrictions      STUDIES REVIEWED:  No Studies to review      PROCEDURES PERFORMED:  Procedures  No Procedures performed today    Scribe Attestation    I,:  Ralph Kowalski am acting as a scribe while in the presence of the attending physician :       I,:  Baltazar Rasmussen MD personally performed the services described in this documentation    as scribed in my presence :

## 2022-12-15 NOTE — PROGRESS NOTES
OT Re-Evaluation  and OT Discharge     Today's date: 12/15/2022  Patient name: Vladislav Murcia  : 1970  MRN: [id number]  Referring provider: ROMAN Van*  Dx:   Encounter Diagnosis     ICD-10-CM    1  Medial epicondylitis of elbow, left  M77 02       2  Surgery vstzki-wy-aokint post left release epicondylar elbow medial 2022 Dr Handley Second  Z09                      Assessment  Assessment details: Patient reported that she began pain in her elbow years ago  Patient reported that she has had two surgeries in the past to try to alleviate the problem  Patient underwent left medial epicondylar release 22 and referred to therapy for splint and range of motion  Vladislav Murcia is a 49 y o  female who presents with Surgery [id number]-zk-qemlav post left release epicondylar elbow medial 2022 Dr Handley Second  Patient tolerated session well  Nata Manriquez reported difficulty with activities of daily living secondary to decreased range of motion and restrictions  Provided home exercise program for wrist, forearm, and elbow range of motion  Patient was able to demonstrate home program past instruction with use of handouts  Patient advised to contact doctor if there is a change of status  Patient advised to discontinue any exercise which cause increased pain  Patient is a good candidate to benefit from skilled occupational therapy to address impairments and return to maximal level of function with minimal symptoms  12/15: Nata Manriquez presents with improved ROM and strength  D/C to [facility] at this time    Impairments: abnormal or restricted ROM, activity intolerance, impaired physical strength, lacks appropriate home exercise program, pain with function and weight-bearing intolerance  Understanding of Dx/Px/POC: good   Prognosis: good    Goals  Short term goals:  Patient to demonstrate understanding of home exercise program in 2 weeks for decreased pain with activities of daily living  Patient to demonstrate increased active range of motion of elbow to 135/-10 degrees in 3 weeks to aid in showering  Patient to demonstrate decreased edema by 2 cm in 4 weeks to increase range of motion for dressing  Patient to report a decrease in pain by at least 1 point on a 0-10 scale in 2 weeks to aid in dressing    Long term goals:  Patient to demonstrate functional active range of motion for independent ADL's by time of discharge  Patient to demonstrate functional strength for independent ADL's by time of discharge  Patient to demonstrate understanding of final discharge home exercise program by time of discharge    Plan  Patient would benefit from: OT eval, skilled occupational therapy and custom splinting  Planned modality interventions: ultrasound and thermotherapy: hydrocollator packs  Planned therapy interventions: joint mobilization, manual therapy, massage, strengthening, stretching, therapeutic activities, therapeutic exercise, fine motor coordination training, flexibility, functional ROM exercises, home exercise program, activity modification, patient education, graded activity and graded exercise  Frequency: 2x week  Duration in weeks: 10  Plan of Care beginning date: 10/10/2022  Plan of Care expiration date: 2022  Treatment plan discussed with: patient        Subjective Evaluation    History of Present Illness  Date of surgery: 2022  Mechanism of injury: Patient reported that she began pain in her elbow years ago  Patient reported that she has had two surgeries in the past to try to alleviate the problem  Patient underwent left medial epicondylar release 22 and referred to therapy for splint and range of motion  "I've had some help with showering and stuff like that " Patient reported being able to do laundry this morning, but doing most of the work with her right hand " Patient reported that she has been unable to wear a bra     Quality of life: good    Pain  At best pain ratin  At worst pain rating: 0  Progression: improved    Social Support    Employment status: working (Miscellaneous- [employer]  -currently out of work   Hobbies: andrez)  Hand dominance: right    Patient Goals  Patient goals for therapy: decreased pain, increased motion, increased strength, independence with ADLs/IADLs, return to sport/leisure activities and return to work          Objective     Observations     Additional Observation Details  No adhesive scar noted    Neurological Testing     Sensation     Elbow   Left Elbow   Intact: light touch    Right Elbow   Intact: light touch    Active Range of Motion     Left Elbow   Flexion: WFL  Extension: WFL  Forearm supination: WFL  Forearm pronation: WFL    Right Elbow   Flexion: 146 degrees   Extension: 0 degrees   Forearm supination: 65 degrees   Forearm pronation: 70 degrees     Left Wrist   Wrist flexion: WFL  Wrist extension: WFL    Right Wrist   Wrist flexion: 60 degrees   Wrist extension: 58 degrees     Additional Active Range of Motion Details  Digit range of motion within normal limits    Strength/Myotome Testing     Left Elbow   Flexion: 5  Extension: 5  Forearm supination: 5  Forearm pronation: 5    Left Wrist/Hand   Wrist extension: 5  Wrist flexion: 5  Radial deviation: 5  Ulnar deviation: 5     (2nd hand position)     Trial 1: 58 3    Right Wrist/Hand      (2nd hand position)     Trial 1: 66 6    Tests     Additional Tests Details  Denies Numbness and Tingling             Precautions: L medial epicondylar release 9/20/22  Weeks 1-4   -Fabricate long arm splint   -Scar and edema management   -AROM for the wrist, forearm, elbow   -Avoid composite wrist extension with elbow in extension  4 weeks   -Transition to [facility]    -Continue motion and may begin gentle strengthening  8 weeks   -May begin more aggressive strengthening   -D/c [facility]      Manuals 10/10                                                                Neuro Re-Ed Ther Ex             AROM elbow (wrist neutral) x10            AROM forearm (elbow 90) x10            AROM wrist (elbow 90) x10                                                                             Ther Activity                                                                              Modalities             Moist heat

## 2022-12-15 NOTE — LETTER
December 15, 2022     Patient: Josh Sims  YOB: 1970  Date of Visit: 12/15/2022      To Whom it May Concern:    Selwyn Nichols is under my professional care  Otto Dennis was seen in my office on 12/15/2022  Otto Dennis may return to work on 12/15/22 with no restrictions       If you have any questions or concerns, please don't hesitate to call           Sincerely,          Lina Gamboa MD        CC: No Recipients

## 2022-12-21 NOTE — TELEPHONE ENCOUNTER
Called patient and I rescheduled her Botox to 08/19/21 at 10:45am in the  with Miguelito 
LVM to call office to reschedule appt with Bartolo Quinn that was cancelled for today due to family emergency    Routed to [facility]
Patient called to cancel appointment schedule for today Thursday Aug 5, 2021  10:00 AM BOTOX INJECTION [facility]  Chase Smith PA-C [facility] due to family emergency and she would like the office to call back to reschedule 
Patient returned call, unable to find appt, spoke with [facility], she will try to find a spot to put patient in for botox
Improve bed mobility to min assist x 1 person by d/c.

## 2022-12-22 ENCOUNTER — TELEPHONE (OUTPATIENT)
Dept: NEUROLOGY | Facility: CLINIC | Age: 52
End: 2022-12-22

## 2022-12-22 NOTE — TELEPHONE ENCOUNTER
Patient called she wants her botox appointment today with Shiela Johnson rescheduled  She is not feeling well  She apologizes for canceling last minute  She wanted to wait and see if she felt better  Please call patient at [phone]

## 2022-12-27 NOTE — PROGRESS NOTES
Name: Xavi Gan      : 1970      MRN: [id number]  Encounter Provider: JHONATAN Meza  Encounter Date: 2022   Encounter department: [address] [place] [address]     1  Urinary incontinence, unspecified type  Assessment & Plan:  Recurrent problem   Has been seen by urogynecology in the past and was put on Toviaz 4mg daily  · Was increased on this dose but did not see effectiveness of medication so went back down to 4mg  Previously completed pelvic floor physical therapy   Patient would like to see urogynecology again but needs new referral- placed today   Encouraged Kegel exercises, timed interval bladder emptying, limiting liquids before bed and pelvic floor exercises  PCOT urine (-)       Orders:  -     POCT urine dip  -     Ambulatory Referral to Urogynecology; Future    2  Severe episode of recurrent major depressive disorder, without psychotic features [facility])  Assessment & Plan:  Currently being seen by psychiatry- Has appointment today  1pm  Increased life stressors have been making her emotional and upset   · Has been in contact with crisis over the past couple of days and declines admission to the hospital at this time   Large bandage on the left forearm which patient declined to show provider    Emotional support provided            Subjective      Urinary Frequency   This is a chronic problem  The current episode started more than 1 year ago  The problem occurs every urination  The problem has been waxing and waning  The pain is at a severity of 0/10  The patient is experiencing no pain  There has been no fever  She is not sexually active  There is no history of pyelonephritis  Associated symptoms include frequency  Pertinent negatives include no chills, hematuria or vomiting  Treatments tried: Previously seen by urogyn, also on medications  The treatment provided no relief       Review of Systems   Constitutional: Negative for chills and fever  HENT: Negative for ear pain and sore throat  Eyes: Negative for pain and visual disturbance  Respiratory: Negative for cough and shortness of breath  Cardiovascular: Negative for chest pain and palpitations  Gastrointestinal: Negative for abdominal pain and vomiting  Genitourinary: Positive for frequency  Negative for dysuria and hematuria  Musculoskeletal: Negative for arthralgias and back pain  Skin: Negative for color change and rash  Neurological: Negative for seizures and syncope  All other systems reviewed and are negative  Current Outpatient Medications on File Prior to Visit   Medication Sig   • acetaminophen (TYLENOL) 325 mg tablet Take 650 mg by mouth every 6 (six) hours as needed for mild pain   • buPROPion (WELLBUTRIN XL) 300 mg 24 hr tablet Take 300 mg by mouth daily   • carBAMazepine (CARBATROL) 300 MG 12 hr capsule Take 1 capsule by mouth in the morning   • Cholecalciferol (Vitamin D3) 125 MCG (5000 UT) CAPS Take by mouth in the morning   • divalproex sodium (DEPAKOTE ER) 250 mg 24 hr tablet    • divalproex sodium (DEPAKOTE ER) 500 mg 24 hr tablet 500 mg 2 (two) times a day   • estradiol (ESTRACE) 0 5 MG tablet TAKE 1 TABLET (0 5 MG TOTAL) BY MOUTH DAILY   • Fesoterodine Fumarate ER (Toviaz) 4 MG TB24 Take 1 tablet by mouth daily   • fluticasone (FLOVENT HFA) 110 MCG/ACT inhaler Inhale 2 puffs as needed Rinse mouth after use     • furosemide (LASIX) 20 mg tablet TAKE 1 TABLET BY MOUTH DAILY   • haloperidol decanoate (Haldol Decanoate) 50 mg/mL injection every 30 (thirty) days   • losartan (COZAAR) 50 mg tablet TAKE ONE TABLET BY MOUTH DAILY   • metoprolol tartrate (LOPRESSOR) 25 mg tablet TAKE ONE TABLET BY MOUTH EVERY 12 HOURS   • pilocarpine (SALAGEN) 5 mg tablet Take 5 mg by mouth 2 (two) times a day   • RABEprazole (ACIPHEX) 20 MG tablet Take 20 mg by mouth 2 (two) times a day   • rOPINIRole (REQUIP) 0 5 mg tablet Take by mouth daily at bedtime   • rOPINIRole (REQUIP) 1 mg tablet    • topiramate (TOPAMAX) 50 MG tablet Take 50 mg by mouth 2 (two) times a day   • venlafaxine (EFFEXOR) 37 5 mg tablet Take 37 5 mg by mouth daily   • albuterol (Ventolin HFA) 90 mcg/act inhaler Inhale 2 puffs every 6 (six) hours as needed for wheezing (Patient not taking: Reported on 12/28/2022)   • amLODIPine (NORVASC) 5 mg tablet TAKE 1 TABLET (5 MG TOTAL) BY MOUTH DAILY   • buPROPion (WELLBUTRIN XL) 150 mg 24 hr tablet Take 1 tablet (150 mg total) by mouth daily   • colestipol (COLESTID) 1 g tablet Take 1 tablet (1 g total) by mouth 2 (two) times a day   • divalproex sodium (DEPAKOTE) 250 mg EC tablet Take 3 tablets (750 mg total) by mouth every 12 (twelve) hours   • hydrocortisone 2 5 % ointment Apply topically 2 (two) times a day (Patient not taking: Reported on 12/28/2022)   • hydrOXYzine HCL (ATARAX) 25 mg tablet Take 1 tablet (25 mg total) by mouth every 6 (six) hours as needed for itching (mild anxiety)   • levothyroxine 100 mcg tablet TAKE 1 TABLET (100 MCG TOTAL) BY MOUTH DAILY IN THE EARLY MORNING   • lurasidone 60 MG TABS Take 1 tablet (60 mg total) by mouth daily with breakfast (Patient taking differently: Take 60 mg by mouth 2 (two) times a day TAKEN IN THE MORNING AND AT NIGHT-----Latuda)   • mupirocin (BACTROBAN) 2 % ointment Apply topically 3 (three) times a day   • topiramate (TOPAMAX) 100 mg tablet Take 1 tablet (100 mg total) by mouth 2 (two) times a day (Patient taking differently: Take 150 mg by mouth 2 (two) times a day)   • [DISCONTINUED] methylPREDNISolone 4 MG tablet therapy pack Use as directed on package (Patient not taking: Reported on 12/15/2022)   • [DISCONTINUED] topiramate (TOPAMAX) 200 MG tablet Take 1 tablet by mouth 2 (two) times a day (Patient not taking: Reported on 12/15/2022)       Objective     /78 (BP Location: Left arm, Patient Position: Sitting, Cuff Size: Large)   Pulse 93   Temp 97 8 °F (36 6 °C) (Temporal)   Resp 18   Wt 130 kg (286 lb) SpO2 98%   BMI 46 16 kg/m²     Physical Exam  Vitals and nursing note reviewed  Constitutional:       Appearance: Normal appearance  She is normal weight  HENT:      Head: Normocephalic  Right Ear: Tympanic membrane, ear canal and external ear normal       Left Ear: Tympanic membrane, ear canal and external ear normal       Nose: Nose normal       Mouth/Throat:      Mouth: Mucous membranes are moist       Pharynx: Oropharynx is clear  Eyes:      Extraocular Movements: Extraocular movements intact  Conjunctiva/sclera: Conjunctivae normal       Pupils: Pupils are equal, round, and reactive to light  Cardiovascular:      Rate and Rhythm: Normal rate and regular rhythm  Pulses: Normal pulses  Heart sounds: Normal heart sounds  Pulmonary:      Effort: Pulmonary effort is normal       Breath sounds: Normal breath sounds  Abdominal:      General: Bowel sounds are normal       Palpations: Abdomen is soft  Musculoskeletal:         General: Normal range of motion  Cervical back: Normal range of motion  Skin:     General: Skin is warm  Capillary Refill: Capillary refill takes less than 2 seconds  Neurological:      General: No focal deficit present  Mental Status: She is alert and oriented to person, place, and time  Mental status is at baseline  Psychiatric:         Mood and Affect: Mood normal          Behavior: Behavior normal          Thought Content: Thought content normal          Judgment: Judgment normal        Patient Instructions   Kegel Exercises for Women   AMBULATORY CARE:   Kegel exercises  help strengthen your pelvic muscles  Pelvic muscles hold your pelvic organs, such as your bladder and uterus, in place  Kegel exercises help prevent or control problems with urine incontinence (leakage)  Incontinence may be caused by pregnancy, childbirth, or menopause  Contact your healthcare provider if:   · You cannot feel your muscles tighten or relax      · You continue to leak urine  · You have questions or concerns about your condition or care  Use the correct muscles:  Pelvic muscles are the muscles you use to control urine flow  To target these muscles, stop and start the flow of urine several times  This will help you become familiar with how it feels to tighten and relax these muscles  How to do Kegel exercises:   · Empty your bladder  You may lie down, stand up, or sit down to do these exercises  When you first try to do these exercises, it may be easier if you lie down  Tighten or squeeze your pelvic muscles slowly  It may feel like you are trying to hold back urine or gas  Hold this position for 3 seconds  Relax for 3 seconds  Repeat this cycle 10 times  · Do 10 sets of Kegel exercises, at least 3 times a day  Do not hold your breath when you do Kegel exercises  Keep your stomach, back, and leg muscles relaxed  · As your muscles get stronger, you will be able to hold the squeeze longer  Your healthcare provider may ask that you increase your pelvic muscle squeeze to 10 seconds  After you squeeze for 10 seconds, relax for 10 seconds  What else you should know:   · Once you know how to do Kegel exercises, use different positions  You can do these exercises while you lie on the floor, sit at your desk or watch TV, and while you stand  · You may notice improved bladder control within about 6 weeks  · Tighten your pelvic muscles before you sneeze, cough, or lift to prevent urine leakage  Follow up with your doctor as directed:  Write down your questions so you remember to ask them during your visits  © Copyright [employer] 2022 Information is for End User's use only and may not be sold, redistributed or otherwise used for commercial purposes  All illustrations and images included in CareNotes® are the copyrighted property of A D A M , Inc  or [facility] [employer]   The above information is an  only   It is not intended as medical advice for individual conditions or treatments  Talk to your doctor, nurse or pharmacist before following any medical regimen to see if it is safe and effective for you          Keith Elizabeth, JHONATAN

## 2022-12-28 ENCOUNTER — ANNUAL EXAM (OUTPATIENT)
Dept: OBGYN CLINIC | Facility: CLINIC | Age: 52
End: 2022-12-28

## 2022-12-28 ENCOUNTER — OFFICE VISIT (OUTPATIENT)
Dept: FAMILY MEDICINE CLINIC | Facility: CLINIC | Age: 52
End: 2022-12-28

## 2022-12-28 VITALS
HEIGHT: 66 IN | BODY MASS INDEX: 45.96 KG/M2 | WEIGHT: 286 LBS | SYSTOLIC BLOOD PRESSURE: 132 MMHG | DIASTOLIC BLOOD PRESSURE: 78 MMHG

## 2022-12-28 VITALS
TEMPERATURE: 97.8 F | BODY MASS INDEX: 46.16 KG/M2 | WEIGHT: 286 LBS | OXYGEN SATURATION: 98 % | RESPIRATION RATE: 18 BRPM | HEART RATE: 93 BPM | DIASTOLIC BLOOD PRESSURE: 78 MMHG | SYSTOLIC BLOOD PRESSURE: 136 MMHG

## 2022-12-28 DIAGNOSIS — F60.3 BORDERLINE PERSONALITY DISORDER (HCC): ICD-10-CM

## 2022-12-28 DIAGNOSIS — R32 URINARY INCONTINENCE, UNSPECIFIED TYPE: Primary | ICD-10-CM

## 2022-12-28 DIAGNOSIS — Z12.31 ENCOUNTER FOR SCREENING MAMMOGRAM FOR MALIGNANT NEOPLASM OF BREAST: ICD-10-CM

## 2022-12-28 DIAGNOSIS — N32.81 OVERACTIVE BLADDER: Chronic | ICD-10-CM

## 2022-12-28 DIAGNOSIS — F33.2 SEVERE EPISODE OF RECURRENT MAJOR DEPRESSIVE DISORDER, WITHOUT PSYCHOTIC FEATURES (HCC): ICD-10-CM

## 2022-12-28 DIAGNOSIS — F41.9 ANXIETY: ICD-10-CM

## 2022-12-28 DIAGNOSIS — Z01.419 ENCOUNTER FOR ANNUAL ROUTINE GYNECOLOGICAL EXAMINATION: Primary | ICD-10-CM

## 2022-12-28 LAB
SL AMB  POCT GLUCOSE, UA: NEGATIVE
SL AMB LEUKOCYTE ESTERASE,UA: NEGATIVE
SL AMB POCT BILIRUBIN,UA: NEGATIVE
SL AMB POCT BLOOD,UA: NEGATIVE
SL AMB POCT CLARITY,UA: CLEAR
SL AMB POCT COLOR,UA: YELLOW
SL AMB POCT KETONES,UA: NEGATIVE
SL AMB POCT NITRITE,UA: NEGATIVE
SL AMB POCT PH,UA: 7.5
SL AMB POCT SPECIFIC GRAVITY,UA: 1
SL AMB POCT URINE PROTEIN: NORMAL
SL AMB POCT UROBILINOGEN: NEGATIVE

## 2022-12-28 RX ORDER — OXYBUTYNIN CHLORIDE 5 MG/1
5 TABLET ORAL 2 TIMES DAILY
Qty: 60 TABLET | Refills: 1 | Status: SHIPPED | OUTPATIENT
Start: 2022-12-28 | End: 2022-12-28 | Stop reason: CLARIF

## 2022-12-28 NOTE — ASSESSMENT & PLAN NOTE
Currently being seen by psychiatry- Has appointment today 12/28 1pm  Increased life stressors have been making her emotional and upset   · Has been in contact with crisis over the past couple of days and declines admission to the hospital at this time   Large bandage on the left forearm which patient declined to show provider    Emotional support provided

## 2022-12-28 NOTE — ASSESSMENT & PLAN NOTE
Recurrent problem   Has been seen by urogynecology in the past and was put on Toviaz 4mg daily  · Was increased on this dose but did not see effectiveness of medication so went back down to 4mg  Previously completed pelvic floor physical therapy   Patient would like to see urogynecology again but needs new referral- placed today   Encouraged Kegel exercises, timed interval bladder emptying, limiting liquids before bed and pelvic floor exercises       PCOT urine (-)

## 2022-12-28 NOTE — PATIENT INSTRUCTIONS
Kegel Exercises for Women   AMBULATORY CARE:   Kegel exercises  help strengthen your pelvic muscles  Pelvic muscles hold your pelvic organs, such as your bladder and uterus, in place  Kegel exercises help prevent or control problems with urine incontinence (leakage)  Incontinence may be caused by pregnancy, childbirth, or menopause  Contact your healthcare provider if:   You cannot feel your muscles tighten or relax  You continue to leak urine  You have questions or concerns about your condition or care  Use the correct muscles:  Pelvic muscles are the muscles you use to control urine flow  To target these muscles, stop and start the flow of urine several times  This will help you become familiar with how it feels to tighten and relax these muscles  How to do Kegel exercises:   Empty your bladder  You may lie down, stand up, or sit down to do these exercises  When you first try to do these exercises, it may be easier if you lie down  Tighten or squeeze your pelvic muscles slowly  It may feel like you are trying to hold back urine or gas  Hold this position for 3 seconds  Relax for 3 seconds  Repeat this cycle 10 times  Do 10 sets of Kegel exercises, at least 3 times a day  Do not hold your breath when you do Kegel exercises  Keep your stomach, back, and leg muscles relaxed  As your muscles get stronger, you will be able to hold the squeeze longer  Your healthcare provider may ask that you increase your pelvic muscle squeeze to 10 seconds  After you squeeze for 10 seconds, relax for 10 seconds  What else you should know:   Once you know how to do Kegel exercises, use different positions  You can do these exercises while you lie on the floor, sit at your desk or watch TV, and while you stand  You may notice improved bladder control within about 6 weeks  Tighten your pelvic muscles before you sneeze, cough, or lift to prevent urine leakage      Follow up with your doctor as directed: Write down your questions so you remember to ask them during your visits  © Copyright [employer] 2022 Information is for End User's use only and may not be sold, redistributed or otherwise used for commercial purposes  All illustrations and images included in CareNotes® are the copyrighted property of A D A M , Inc  or Andrea Rosas  The above information is an  only  It is not intended as medical advice for individual conditions or treatments  Talk to your doctor, nurse or pharmacist before following any medical regimen to see if it is safe and effective for you

## 2022-12-28 NOTE — PROGRESS NOTES
Assessment/Plan:  Pap smear done for cytology, reflex HPV  Encourage self breast examination as well as calcium supplementation  Continue annual mammogram   Reviewed colon cancer screening, up-to-date  She will continue to follow-up with her therapist and psychiatrist as scheduled  She will continue Estrace 0 5 mg orally x1 year  Return to office in 1 year or as needed  No problem-specific Assessment & Plan notes found for this encounter  Diagnoses and all orders for this visit:    Encounter for annual routine gynecological examination  -     Liquid-based pap, screening    Encounter for screening mammogram for malignant neoplasm of breast  -     Mammo screening bilateral w 3d & cad; Future    Overactive bladder    Borderline personality disorder (HCC)    Anxiety          Subjective:      Patient ID: Gerald Arnett is a 47 y o  female  HPI     This is a very pleasant 81-year-old female G0 who presents for GYN exam   Her GYN history significant for supracervical hysterectomy, unilateral salpingo-oophorectomy in her early 45s due to irregular bleeding  She has been on oral estrogen replacement therapy for the last 6 years  She denies any vaginal bleeding or spotting  No changes in bowel or bladder function  She does follow-up with urogynecology for overactive bladder  She does follow-up with psychiatry and a therapist (new) on a weekly basis  Medications have been recently adjusted  She has had a difficult time through holiday season  She is working 3 days a week  Her mother had passed away approximately a year ago  She does see her father once in a while  I did notice cutting scars on her right inner wrist, left wrist wrapped with gauze  Patient did admit to frequent cutting  She is scheduled to see her psychiatrist and therapist today  In the course of the year, underwent right knee replacement, left elbow surgery (pinched nerve)    , Scheduled for left hand surgery    She does follow-up with her gastroenterologist  , Underwent colonoscopy 1 to 2 years ago, normal   She does follow-up with her family physician on a regular basis  The following portions of the patient's history were reviewed and updated as appropriate: allergies, current medications, past family history, past medical history, past social history, past surgical history and problem list     Review of Systems   Constitutional: Negative for fatigue, fever and unexpected weight change  Respiratory: Negative for cough, chest tightness, shortness of breath and wheezing  Cardiovascular: Negative  Negative for chest pain and palpitations  Gastrointestinal: Negative  Negative for abdominal distention, abdominal pain, blood in stool, constipation, diarrhea, nausea and vomiting  Genitourinary: Negative  Negative for difficulty urinating, dyspareunia, dysuria, flank pain, frequency, genital sores, hematuria, pelvic pain, urgency, vaginal bleeding, vaginal discharge and vaginal pain  Skin: Negative for rash  Objective:      /78   Ht 5' 6" (1 676 m)   Wt 130 kg (286 lb)   BMI 46 16 kg/m²          Physical Exam  Constitutional:       Appearance: Normal appearance  She is well-developed  Cardiovascular:      Rate and Rhythm: Normal rate and regular rhythm  Pulmonary:      Effort: Pulmonary effort is normal       Breath sounds: Normal breath sounds  Chest:   Breasts:     Right: No inverted nipple, mass, nipple discharge, skin change or tenderness  Left: No inverted nipple, mass, nipple discharge, skin change or tenderness  Abdominal:      General: Bowel sounds are normal  There is no distension  Palpations: Abdomen is soft  Tenderness: There is no abdominal tenderness  There is no guarding or rebound  Genitourinary:     Labia:         Right: No rash, tenderness or lesion  Left: No rash, tenderness or lesion  Vagina: Normal  No signs of injury   No vaginal discharge or tenderness  Cervix: No cervical motion tenderness, discharge, friability or erythema  Uterus: Absent  Adnexa:         Right: No mass, tenderness or fullness  Left: No mass, tenderness or fullness  Neurological:      Mental Status: She is alert and oriented to person, place, and time  Psychiatric:         Behavior: Behavior normal        External genitalia is within normal limits  The vagina is evident of slight estrogen deficiency  Cervix is small nulliparous  Uterine corpus surgically absent

## 2022-12-30 NOTE — TELEPHONE ENCOUNTER
Patient called to schedule botox appointment  She has been calling and no one is calling her back   Please call patient to schedule at [phone]

## 2022-12-30 NOTE — TELEPHONE ENCOUNTER
Patient called back asking to speak with someone to get her Botox injection appointment rescheduled from 12-22-22  I advised her I will send a message to the provider and her team to see if we can get her scheduled for next week, and I will call her back with an answer

## 2023-01-02 DIAGNOSIS — N95.1 VASOMOTOR SYMPTOMS DUE TO MENOPAUSE: ICD-10-CM

## 2023-01-03 LAB
LAB AP GYN PRIMARY INTERPRETATION: NORMAL
Lab: NORMAL

## 2023-01-03 NOTE — TELEPHONE ENCOUNTER
ADD ON     Called patient to offer her an appt for 1-3-23 and she declined, I then offered her 1-5-23 at 10 am with Elizabeth Howell and she accepted

## 2023-01-05 ENCOUNTER — PROCEDURE VISIT (OUTPATIENT)
Dept: NEUROLOGY | Facility: CLINIC | Age: 53
End: 2023-01-05

## 2023-01-05 ENCOUNTER — APPOINTMENT (OUTPATIENT)
Dept: LAB | Facility: CLINIC | Age: 53
End: 2023-01-05

## 2023-01-05 VITALS — HEART RATE: 72 BPM | DIASTOLIC BLOOD PRESSURE: 84 MMHG | TEMPERATURE: 97 F | SYSTOLIC BLOOD PRESSURE: 148 MMHG

## 2023-01-05 DIAGNOSIS — F45.8 ANXIETY HYPERVENTILATION: ICD-10-CM

## 2023-01-05 DIAGNOSIS — K21.9 GASTROESOPHAGEAL REFLUX DISEASE, UNSPECIFIED WHETHER ESOPHAGITIS PRESENT: ICD-10-CM

## 2023-01-05 DIAGNOSIS — K62.89 CHRONIC IDIOPATHIC ANAL PAIN: ICD-10-CM

## 2023-01-05 DIAGNOSIS — G47.30 INSOMNIA WITH SLEEP APNEA: ICD-10-CM

## 2023-01-05 DIAGNOSIS — I10 ESSENTIAL HYPERTENSION, MALIGNANT: ICD-10-CM

## 2023-01-05 DIAGNOSIS — F32.4 MAJOR DEPRESSIVE DISORDER IN PARTIAL REMISSION, UNSPECIFIED WHETHER RECURRENT (HCC): ICD-10-CM

## 2023-01-05 DIAGNOSIS — F31.81 BIPOLAR II DISORDER (HCC): ICD-10-CM

## 2023-01-05 DIAGNOSIS — G43.709 CHRONIC MIGRAINE WITHOUT AURA WITHOUT STATUS MIGRAINOSUS, NOT INTRACTABLE: Primary | ICD-10-CM

## 2023-01-05 DIAGNOSIS — F41.9 ANXIETY HYPERVENTILATION: ICD-10-CM

## 2023-01-05 DIAGNOSIS — R20.0 TACTILE ANESTHESIA: ICD-10-CM

## 2023-01-05 DIAGNOSIS — Z79.899 ENCOUNTER FOR LONG-TERM (CURRENT) USE OF OTHER MEDICATIONS: ICD-10-CM

## 2023-01-05 DIAGNOSIS — G47.00 INSOMNIA WITH SLEEP APNEA: ICD-10-CM

## 2023-01-05 DIAGNOSIS — G89.29 CHRONIC IDIOPATHIC ANAL PAIN: ICD-10-CM

## 2023-01-05 DIAGNOSIS — F60.3 EXPLOSIVE PERSONALITY DISORDER (HCC): ICD-10-CM

## 2023-01-05 LAB
BASOPHILS # BLD AUTO: 0.03 THOUSANDS/ÂΜL (ref 0–0.1)
BASOPHILS NFR BLD AUTO: 0 % (ref 0–1)
EOSINOPHIL # BLD AUTO: 0.07 THOUSAND/ÂΜL (ref 0–0.61)
EOSINOPHIL NFR BLD AUTO: 1 % (ref 0–6)
ERYTHROCYTE [DISTWIDTH] IN BLOOD BY AUTOMATED COUNT: 13.5 % (ref 11.6–15.1)
HCT VFR BLD AUTO: 41.4 % (ref 34.8–46.1)
HGB BLD-MCNC: 14 G/DL (ref 11.5–15.4)
IMM GRANULOCYTES # BLD AUTO: 0.08 THOUSAND/UL (ref 0–0.2)
IMM GRANULOCYTES NFR BLD AUTO: 1 % (ref 0–2)
LYMPHOCYTES # BLD AUTO: 2.11 THOUSANDS/ÂΜL (ref 0.6–4.47)
LYMPHOCYTES NFR BLD AUTO: 23 % (ref 14–44)
MCH RBC QN AUTO: 33.3 PG (ref 26.8–34.3)
MCHC RBC AUTO-ENTMCNC: 33.8 G/DL (ref 31.4–37.4)
MCV RBC AUTO: 98 FL (ref 82–98)
MONOCYTES # BLD AUTO: 0.93 THOUSAND/ÂΜL (ref 0.17–1.22)
MONOCYTES NFR BLD AUTO: 10 % (ref 4–12)
NEUTROPHILS # BLD AUTO: 6.11 THOUSANDS/ÂΜL (ref 1.85–7.62)
NEUTS SEG NFR BLD AUTO: 65 % (ref 43–75)
NRBC BLD AUTO-RTO: 0 /100 WBCS
PLATELET # BLD AUTO: 69 THOUSANDS/UL (ref 149–390)
PMV BLD AUTO: 12.1 FL (ref 8.9–12.7)
RBC # BLD AUTO: 4.21 MILLION/UL (ref 3.81–5.12)
WBC # BLD AUTO: 9.33 THOUSAND/UL (ref 4.31–10.16)

## 2023-01-05 NOTE — PROGRESS NOTES
Universal Protocol   Consent: Verbal consent obtained  Written consent obtained  Risks and benefits: risks, benefits and alternatives were discussed  Consent given by: patient  Time out: Immediately prior to procedure a "time out" was called to verify the correct patient, procedure, equipment, support staff and site/side marked as required  Patient understanding: patient states understanding of the procedure being performed  Patient consent: the patient's understanding of the procedure matches consent given  Procedure consent: procedure consent matches procedure scheduled  Relevant documents: relevant documents present and verified  Patient identity confirmed: verbally with patient        Chemodenervation     Date/Time 1/5/2023 10:16 AM     Performed by  Scot Anguiano PA-C     Authorized by Scot Anguiano PA-C        Pre-procedure details      Prepped With: Alcohol     Anesthesia  (see MAR for exact dosages):      Anesthesia method:  None   Procedure details     Position:  Upright   Botox     Botox Type:  Type A    Brand:  Botox    mL's of Botulinum Toxin:  200    Final Concentration per CC:  50 units    Needle Gauge:  30 G 2 5 inch   Procedures     Botox Procedures: chronic headache      Indications: migraines     Injection Location      Head / Face:  L superior cervical paraspinal, R superior cervical paraspinal, L , R , L frontalis, R frontalis, L medial occipitalis, R medial occipitalis, procerus, R temporalis, L temporalis, R superior trapezius and L superior trapezius    L  injection amount:  5 unit(s)    R  injection amount:  5 unit(s)    L lateral frontalis:  5 unit(s)    R lateral frontalis:  5 unit(s)    L medial frontalis:  5 unit(s)    R medial frontalis:  5 unit(s)    L temporalis injection amount:  20 unit(s)    R temporalis injection amount:  20 unit(s)    Procerus injection amount:  5 unit(s)    L medial occipitalis injection amount:  15 unit(s)    R medial occipitalis injection amount:  15 unit(s)    L superior cervical paraspinal injection amount:  10 unit(s)    R superior cervical paraspinal injection amount:  10 unit(s)    L superior trapezius injection amount:  15 unit(s)    R superior trapezius injection amount:  15 unit(s)   Total Units     Total units used:  200    Total units discarded:  0   Post-procedure details      Chemodenervation:  Chronic migraine    Facial Nerve Location[de-identified]  Bilateral facial nerve    Patient tolerance of procedure:   Tolerated well, no immediate complications   Comments      25 units occipitalis   20 units frontalis   All medically necessary

## 2023-01-06 ENCOUNTER — APPOINTMENT (OUTPATIENT)
Dept: RADIOLOGY | Age: 53
End: 2023-01-06

## 2023-01-06 ENCOUNTER — APPOINTMENT (OUTPATIENT)
Dept: LAB | Facility: CLINIC | Age: 53
End: 2023-01-06

## 2023-01-06 DIAGNOSIS — G89.29 CHRONIC IDIOPATHIC ANAL PAIN: ICD-10-CM

## 2023-01-06 DIAGNOSIS — F31.81 BIPOLAR II DISORDER (HCC): ICD-10-CM

## 2023-01-06 DIAGNOSIS — F41.9 ANXIETY HYPERVENTILATION: ICD-10-CM

## 2023-01-06 DIAGNOSIS — K21.9 GASTROESOPHAGEAL REFLUX DISEASE, UNSPECIFIED WHETHER ESOPHAGITIS PRESENT: ICD-10-CM

## 2023-01-06 DIAGNOSIS — G47.30 INSOMNIA WITH SLEEP APNEA: ICD-10-CM

## 2023-01-06 DIAGNOSIS — F32.4 MAJOR DEPRESSIVE DISORDER IN PARTIAL REMISSION, UNSPECIFIED WHETHER RECURRENT (HCC): ICD-10-CM

## 2023-01-06 DIAGNOSIS — R20.0 TACTILE ANESTHESIA: ICD-10-CM

## 2023-01-06 DIAGNOSIS — I10 ESSENTIAL HYPERTENSION, MALIGNANT: ICD-10-CM

## 2023-01-06 DIAGNOSIS — F45.8 ANXIETY HYPERVENTILATION: ICD-10-CM

## 2023-01-06 DIAGNOSIS — M25.551 RIGHT HIP PAIN: ICD-10-CM

## 2023-01-06 DIAGNOSIS — G47.00 INSOMNIA WITH SLEEP APNEA: ICD-10-CM

## 2023-01-06 DIAGNOSIS — F60.3 EXPLOSIVE PERSONALITY DISORDER (HCC): ICD-10-CM

## 2023-01-06 DIAGNOSIS — Z79.899 ENCOUNTER FOR LONG-TERM (CURRENT) USE OF OTHER MEDICATIONS: ICD-10-CM

## 2023-01-06 DIAGNOSIS — K62.89 CHRONIC IDIOPATHIC ANAL PAIN: ICD-10-CM

## 2023-01-06 LAB
ALBUMIN SERPL BCP-MCNC: 3.2 G/DL (ref 3.5–5)
ALP SERPL-CCNC: 77 U/L (ref 46–116)
ALT SERPL W P-5'-P-CCNC: 29 U/L (ref 12–78)
AMMONIA PLAS-SCNC: 50 UMOL/L (ref 18–72)
ANION GAP SERPL CALCULATED.3IONS-SCNC: 4 MMOL/L (ref 4–13)
AST SERPL W P-5'-P-CCNC: 14 U/L (ref 5–45)
BILIRUB SERPL-MCNC: 0.24 MG/DL (ref 0.2–1)
BUN SERPL-MCNC: 13 MG/DL (ref 5–25)
CALCIUM ALBUM COR SERPL-MCNC: 9.6 MG/DL (ref 8.3–10.1)
CALCIUM SERPL-MCNC: 9 MG/DL (ref 8.3–10.1)
CHLORIDE SERPL-SCNC: 110 MMOL/L (ref 96–108)
CHOLEST SERPL-MCNC: 235 MG/DL
CO2 SERPL-SCNC: 26 MMOL/L (ref 21–32)
CREAT SERPL-MCNC: 0.56 MG/DL (ref 0.6–1.3)
GFR SERPL CREATININE-BSD FRML MDRD: 108 ML/MIN/1.73SQ M
GLUCOSE P FAST SERPL-MCNC: 88 MG/DL (ref 65–99)
HDLC SERPL-MCNC: 63 MG/DL
LDLC SERPL CALC-MCNC: 127 MG/DL (ref 0–100)
NONHDLC SERPL-MCNC: 172 MG/DL
POTASSIUM SERPL-SCNC: 4.3 MMOL/L (ref 3.5–5.3)
PROT SERPL-MCNC: 7 G/DL (ref 6.4–8.4)
SODIUM SERPL-SCNC: 140 MMOL/L (ref 135–147)
T4 FREE SERPL-MCNC: 0.87 NG/DL (ref 0.76–1.46)
TRIGL SERPL-MCNC: 227 MG/DL
TSH SERPL DL<=0.05 MIU/L-ACNC: 10.6 UIU/ML (ref 0.45–4.5)
VALPROATE SERPL-MCNC: 25 UG/ML (ref 50–100)

## 2023-01-08 LAB
EST. AVERAGE GLUCOSE BLD GHB EST-MCNC: 97 MG/DL
HBA1C MFR BLD: 5 %

## 2023-01-08 RX ORDER — ESTRADIOL 0.5 MG/1
0.5 TABLET ORAL DAILY
Qty: 30 TABLET | Refills: 0 | Status: SHIPPED | OUTPATIENT
Start: 2023-01-08

## 2023-01-10 DIAGNOSIS — E03.9 HYPOTHYROIDISM: ICD-10-CM

## 2023-01-10 RX ORDER — LEVOTHYROXINE SODIUM 112 UG/1
112 TABLET ORAL
Qty: 90 TABLET | Refills: 0 | Status: SHIPPED | OUTPATIENT
Start: 2023-01-10 | End: 2023-04-10

## 2023-01-12 ENCOUNTER — OFFICE VISIT (OUTPATIENT)
Dept: FAMILY MEDICINE CLINIC | Facility: CLINIC | Age: 53
End: 2023-01-12

## 2023-01-12 VITALS
RESPIRATION RATE: 20 BRPM | HEART RATE: 66 BPM | BODY MASS INDEX: 46.67 KG/M2 | DIASTOLIC BLOOD PRESSURE: 84 MMHG | HEIGHT: 66 IN | SYSTOLIC BLOOD PRESSURE: 144 MMHG | OXYGEN SATURATION: 98 % | WEIGHT: 290.4 LBS | TEMPERATURE: 97.6 F

## 2023-01-12 DIAGNOSIS — E78.2 MIXED HYPERLIPIDEMIA: ICD-10-CM

## 2023-01-12 DIAGNOSIS — E03.9 ACQUIRED HYPOTHYROIDISM: Chronic | ICD-10-CM

## 2023-01-12 DIAGNOSIS — R32 URINARY INCONTINENCE, UNSPECIFIED TYPE: Primary | ICD-10-CM

## 2023-01-12 DIAGNOSIS — G47.00 INSOMNIA, UNSPECIFIED TYPE: ICD-10-CM

## 2023-01-12 RX ORDER — TRAZODONE HYDROCHLORIDE 50 MG/1
50 TABLET ORAL
Qty: 30 TABLET | Refills: 0 | Status: SHIPPED | OUTPATIENT
Start: 2023-01-12

## 2023-01-12 NOTE — ASSESSMENT & PLAN NOTE
Sleep hygiene practices   Regular aerobic exercise, avoid napping during the day  · Avoid stimulants including caffeine/nicotine  · Life stressors can attribute to lack of sleep    · Bright lights and electronic screens limited 2 hours before bed   Establish consistent sleep/wake times with a bedtime routine   · Ensure the room is dark, cool and quiet   · May utilize a sound machine if needed   Can try OTC melatonin, benadryl, unisom   Educated on meditation and relaxation techniques   As we age it is normal to see sleep related changes including   · Decreased time spent in a deep sleep   · More frequent nighttime awakenings     Started on Trazodone 50mg

## 2023-01-12 NOTE — PROGRESS NOTES
Name: Syd White      : 1970      MRN: [id number]  Encounter Provider: JHONATAN Vaughn  Encounter Date: 2023   Encounter department: [address] [place] [address]     1  Urinary incontinence, unspecified type  Assessment & Plan:  Recurrent problem   Patient saw urogynecology today and they took her off the [address] and added new medication  Referral placed today for pelvic floor physical therapy per patient request  Encouraged Kegel exercises, timed interval bladder emptying, limiting liquids before bed and pelvic floor exercises  Orders:  -     Ambulatory Referral to Physical Therapy; Future    2  Mixed hyperlipidemia  Assessment & Plan:  Recent lipid panel  · Cholesterol 235, Triglycerides 227, HDL 63,   Current medication: Colestipol 1g BID   Patient currently sees GI who prescribes this medication will reach out to them for advice  Discussed diet modifications in order to lower these numbers     Orders:  -     Lipid panel; Future; Expected date: 2023    3  Acquired hypothyroidism  Assessment & Plan:  Labs reviewed   · TSH 10 6, T4 0 87  Current medication: Levothyroxine 100 mcg  · New medication: Levothyroxine 112 mcg  Repeat TSH ordered for 3 months to be collected before next appointmenT      4   Insomnia, unspecified type  Assessment & Plan:  Sleep hygiene practices   Regular aerobic exercise, avoid napping during the day  · Avoid stimulants including caffeine/nicotine  · Life stressors can attribute to lack of sleep    · Bright lights and electronic screens limited 2 hours before bed   Establish consistent sleep/wake times with a bedtime routine   · Ensure the room is dark, cool and quiet   · May utilize a sound machine if needed   Can try OTC melatonin, benadryl, unisom   Educated on meditation and relaxation techniques   As we age it is normal to see sleep related changes including   · Decreased time spent in a deep sleep   · More frequent nighttime awakenings     Started on Trazodone 50mg    Orders:  -     traZODone (DESYREL) 50 mg tablet; Take 1 tablet (50 mg total) by mouth daily at bedtime         Subjective      Patient is here for review of labs  Recent TSH was elevated at 10 6 T4 0 87 levothyroxine increased from 100 mcg to 112 mcg repeat TSH ordered to be completed in 3 months  Patient has an elevated lipid panel with cholesterol at 235, triglycerides at 227, HDL 63,   She does see GI and they put her on Colestipol 1g BID  Patient also would like a referral to pelvic floor physical therapy for urinary incontinence  She does see urology for this issue which she saw today 1/12/2023 and they changed her medications around per patient  Patient is also having increased difficulty sleeping  Has been on trazodone previously in the past and would like to go back on it today  Review of Systems   Constitutional: Negative for activity change, chills, fatigue and fever  HENT: Negative for congestion, ear pain, rhinorrhea, sore throat and trouble swallowing  Eyes: Negative for pain and visual disturbance  Respiratory: Negative for cough, chest tightness and shortness of breath  Cardiovascular: Negative for chest pain, palpitations and leg swelling  Gastrointestinal: Negative for abdominal pain, constipation, diarrhea, nausea and vomiting  Genitourinary: Negative for difficulty urinating, dysuria, hematuria and urgency  Urinary incontinence    Musculoskeletal: Negative for arthralgias and back pain  Skin: Negative for color change and rash  Neurological: Negative for dizziness, seizures, syncope and headaches  Psychiatric/Behavioral: Positive for sleep disturbance  Negative for dysphoric mood  The patient is not nervous/anxious  All other systems reviewed and are negative        Current Outpatient Medications on File Prior to Visit   Medication Sig   • acetaminophen (TYLENOL) 325 mg tablet Take 650 mg by mouth every 6 (six) hours as needed for mild pain   • amLODIPine (NORVASC) 5 mg tablet TAKE 1 TABLET (5 MG TOTAL) BY MOUTH DAILY   • buPROPion (WELLBUTRIN XL) 300 mg 24 hr tablet Take 300 mg by mouth daily   • carBAMazepine (CARBATROL) 300 MG 12 hr capsule Take 1 capsule by mouth in the morning   • Cholecalciferol (Vitamin D3) 125 MCG (5000 UT) CAPS Take by mouth in the morning   • divalproex sodium (DEPAKOTE ER) 250 mg 24 hr tablet    • divalproex sodium (DEPAKOTE ER) 500 mg 24 hr tablet 500 mg 2 (two) times a day   • estradiol (ESTRACE) 0 5 MG tablet TAKE 1 TABLET (0 5 MG TOTAL) BY MOUTH DAILY   • Fesoterodine Fumarate ER (Toviaz) 4 MG TB24 Take 1 tablet by mouth daily   • fluticasone (FLOVENT HFA) 110 MCG/ACT inhaler Inhale 2 puffs as needed Rinse mouth after use     • furosemide (LASIX) 20 mg tablet TAKE 1 TABLET BY MOUTH DAILY   • haloperidol decanoate (Haldol Decanoate) 50 mg/mL injection every 30 (thirty) days   • levothyroxine 112 mcg tablet Take 1 tablet (112 mcg total) by mouth daily in the early morning   • losartan (COZAAR) 50 mg tablet TAKE ONE TABLET BY MOUTH DAILY   • metoprolol tartrate (LOPRESSOR) 25 mg tablet TAKE ONE TABLET BY MOUTH EVERY 12 HOURS   • mupirocin (BACTROBAN) 2 % ointment Apply topically 3 (three) times a day   • pilocarpine (SALAGEN) 5 mg tablet Take 5 mg by mouth 2 (two) times a day   • RABEprazole (ACIPHEX) 20 MG tablet Take 20 mg by mouth 2 (two) times a day   • rOPINIRole (REQUIP) 0 5 mg tablet Take by mouth daily at bedtime   • rOPINIRole (REQUIP) 1 mg tablet    • topiramate (TOPAMAX) 50 MG tablet Take 50 mg by mouth 2 (two) times a day   • venlafaxine (EFFEXOR) 37 5 mg tablet Take 37 5 mg by mouth daily   • albuterol (Ventolin HFA) 90 mcg/act inhaler Inhale 2 puffs every 6 (six) hours as needed for wheezing   • buPROPion (WELLBUTRIN XL) 150 mg 24 hr tablet Take 1 tablet (150 mg total) by mouth daily   • colestipol (COLESTID) 1 g tablet Take 1 tablet (1 g total) by mouth 2 (two) times a day   • divalproex sodium (DEPAKOTE) 250 mg EC tablet Take 3 tablets (750 mg total) by mouth every 12 (twelve) hours   • hydrocortisone 2 5 % ointment Apply topically 2 (two) times a day   • hydrOXYzine HCL (ATARAX) 25 mg tablet Take 1 tablet (25 mg total) by mouth every 6 (six) hours as needed for itching (mild anxiety)   • lurasidone 60 MG TABS Take 1 tablet (60 mg total) by mouth daily with breakfast (Patient taking differently: Take 60 mg by mouth 2 (two) times a day TAKEN IN THE MORNING AND AT NIGHT-----Latuda)   • topiramate (TOPAMAX) 100 mg tablet Take 1 tablet (100 mg total) by mouth 2 (two) times a day (Patient taking differently: Take 150 mg by mouth 2 (two) times a day)       Objective     /84 (BP Location: Left arm, Patient Position: Sitting, Cuff Size: Large)   Pulse 66   Temp 97 6 °F (36 4 °C) (Temporal)   Resp 20   Ht 5' 6" (1 676 m)   Wt 132 kg (290 lb 6 4 oz)   SpO2 98%   BMI 46 87 kg/m²     Physical Exam  JHONATAN Richard

## 2023-01-12 NOTE — ASSESSMENT & PLAN NOTE
Recent lipid panel  · Cholesterol 235, Triglycerides 227, HDL 63,   Current medication: Colestipol 1g BID   Patient currently sees GI who prescribes this medication will reach out to them for advice  Discussed diet modifications in order to lower these numbers

## 2023-01-12 NOTE — ASSESSMENT & PLAN NOTE
Recurrent problem   Patient saw urogynecology today and they took her off the [email or web address] and added new medication  Referral placed today for pelvic floor physical therapy per patient request  Encouraged Kegel exercises, timed interval bladder emptying, limiting liquids before bed and pelvic floor exercises

## 2023-01-12 NOTE — ASSESSMENT & PLAN NOTE
Labs reviewed   · TSH 10 6, T4 0 87  Current medication: Levothyroxine 100 mcg  · New medication: Levothyroxine 112 mcg  Repeat TSH ordered for 3 months to be collected before next appointment

## 2023-01-26 ENCOUNTER — EVALUATION (OUTPATIENT)
Dept: OCCUPATIONAL THERAPY | Age: 53
End: 2023-01-26

## 2023-01-26 ENCOUNTER — OFFICE VISIT (OUTPATIENT)
Dept: FAMILY MEDICINE CLINIC | Facility: CLINIC | Age: 53
End: 2023-01-26

## 2023-01-26 VITALS
WEIGHT: 289.8 LBS | HEART RATE: 69 BPM | DIASTOLIC BLOOD PRESSURE: 82 MMHG | HEIGHT: 65 IN | OXYGEN SATURATION: 98 % | SYSTOLIC BLOOD PRESSURE: 122 MMHG | BODY MASS INDEX: 48.28 KG/M2 | TEMPERATURE: 96.8 F

## 2023-01-26 DIAGNOSIS — M65.9 FLEXOR TENOSYNOVITIS OF FINGER: Primary | ICD-10-CM

## 2023-01-26 DIAGNOSIS — J44.9 CHRONIC OBSTRUCTIVE PULMONARY DISEASE, UNSPECIFIED COPD TYPE (HCC): Chronic | ICD-10-CM

## 2023-01-26 DIAGNOSIS — M25.642 FINGER STIFFNESS, LEFT: ICD-10-CM

## 2023-01-26 DIAGNOSIS — F60.3 BORDERLINE PERSONALITY DISORDER (HCC): ICD-10-CM

## 2023-01-26 DIAGNOSIS — Z00.00 ANNUAL PHYSICAL EXAM: Primary | ICD-10-CM

## 2023-01-26 DIAGNOSIS — Z12.2 ENCOUNTER FOR SCREENING FOR LUNG CANCER: ICD-10-CM

## 2023-01-26 DIAGNOSIS — I10 BENIGN ESSENTIAL HYPERTENSION: Chronic | ICD-10-CM

## 2023-01-26 DIAGNOSIS — F39 MOOD DISORDER (HCC): ICD-10-CM

## 2023-01-26 DIAGNOSIS — M25.551 RIGHT HIP PAIN: ICD-10-CM

## 2023-01-26 DIAGNOSIS — H61.21 IMPACTED CERUMEN OF RIGHT EAR: ICD-10-CM

## 2023-01-26 DIAGNOSIS — F25.0 SCHIZOAFFECTIVE DISORDER, BIPOLAR TYPE (HCC): Chronic | ICD-10-CM

## 2023-01-26 DIAGNOSIS — E66.01 MORBID OBESITY WITH BMI OF 45.0-49.9, ADULT (HCC): ICD-10-CM

## 2023-01-26 DIAGNOSIS — F17.211 NICOTINE DEPENDENCE, CIGARETTES, IN REMISSION: ICD-10-CM

## 2023-01-26 RX ORDER — TROSPIUM CHLORIDE 20 MG/1
20 TABLET, FILM COATED ORAL 2 TIMES DAILY
COMMUNITY
Start: 2023-01-12 | End: 2024-01-12

## 2023-01-26 RX ORDER — TOPIRAMATE 100 MG/1
150 TABLET, FILM COATED ORAL 2 TIMES DAILY
Qty: 90 TABLET | Refills: 0
Start: 2023-01-26 | End: 2023-02-25

## 2023-01-26 NOTE — PROGRESS NOTES
[address]    NAME: Ace Joshi  AGE: 47 y o  SEX: female  : 1970     DATE: 2023     Assessment and Plan:     Problem List Items Addressed This Visit        Respiratory    COPD (chronic obstructive pulmonary disease) ([facility] ) (Chronic)     Well controlled on fluticasone and albuterol             Cardiovascular and Mediastinum    Benign essential hypertension (Chronic)     Today's /82   Current medication: metoprolol 25mg, losartan 50mg, lasix 20mg, amlodipine 5mg   Advised patient on symptoms of hypotension & severe HTN  Limit salt-intake & caffeine  DASH diet discussed, minimize stress level  Weight reduction efforts via improved diet & increased exercise                Other    Schizoaffective disorder, bipolar type (HCC) (Chronic)     Currently managed by psych            Relevant Medications    topiramate (TOPAMAX) 100 mg tablet    Lurasidone HCl 60 MG TABS    Borderline personality disorder ([facility] )     Managed by psychiatry          Relevant Medications    Lurasidone HCl 60 MG TABS    Mood disorder (HCC)    Relevant Medications    Lurasidone HCl 60 MG TABS   Other Visit Diagnoses     Annual physical exam    -  Primary    Right hip pain        xray hip (-)  Referral to PT     Relevant Orders    Ambulatory Referral to Physical Therapy    Encounter for screening for lung cancer        Relevant Orders    CT lung screening program    Nicotine dependence, cigarettes, in remission        Relevant Orders    CT lung screening program    Morbid obesity with BMI of 45 0-49 9, adult ([facility] )              Immunizations and preventive care screenings were discussed with patient today  Appropriate education was printed on patient's after visit summary  Counseling:  Alcohol/drug use: discussed moderation in alcohol intake, the recommendations for healthy alcohol use, and avoidance of illicit drug use    Dental Health: discussed importance of regular tooth brushing, flossing, and dental visits  Injury prevention: discussed safety/seat belts, safety helmets, smoke detectors, carbon dioxide detectors, and smoking near bedding or upholstery  Sexual health: discussed sexually transmitted diseases, partner selection, use of condoms, avoidance of unintended pregnancy, and contraceptive alternatives  · Exercise: the importance of regular exercise/physical activity was discussed  Recommend exercise 3-5 times per week for at least 30 minutes  Return in 1 year (on 2024)  Chief Complaint:     Chief Complaint   Patient presents with   • Physical Exam     Right hip pain       History of Present Illness:     Adult Annual Physical   Patient here for a comprehensive physical exam  The patient reports problems - Right hip pain   Diet and Physical Activity  · Diet/Nutrition: poor diet  · Exercise: no formal exercise  Depression Screening  PHQ-2/9 Depression Screening    Little interest or pleasure in doing things: 3 - nearly every day  Feeling down, depressed, or hopeless: 3 - nearly every day  Trouble falling or staying asleep, or sleeping too much: 3 - nearly every day  Feeling tired or having little energy: 3 - nearly every day  Poor appetite or overeatin - several days  Feeling bad about yourself - or that you are a failure or have let yourself or your family down: 3 - nearly every day  Trouble concentrating on things, such as reading the newspaper or watching television: 3 - nearly every day  Moving or speaking so slowly that other people could have noticed  Or the opposite - being so fidgety or restless that you have been moving around a lot more than usual: 0 - not at all  Thoughts that you would be better off dead, or of hurting yourself in some way: 1 - several days  PHQ-9 Score: 20   PHQ-9 Interpretation: Severe depression        General Health  · Sleep: sleeps poorly     · Hearing: normal - none Meenu Brown · Vision: wears glasses  · Dental: regular dental visits  /GYN Health  · Patient is: postmenopausal     Review of Systems:     Review of Systems   Constitutional: Negative for activity change, chills, fatigue and fever  HENT: Negative for congestion, ear pain, rhinorrhea, sore throat and trouble swallowing  Eyes: Negative for pain and visual disturbance  Respiratory: Negative for cough, chest tightness and shortness of breath  Cardiovascular: Negative for chest pain, palpitations and leg swelling  Gastrointestinal: Negative for abdominal pain, constipation, diarrhea, nausea and vomiting  Genitourinary: Negative for difficulty urinating, dysuria, hematuria and urgency  Musculoskeletal: Negative for arthralgias and back pain  Skin: Negative for color change and rash  Neurological: Negative for dizziness, seizures, syncope and headaches  Psychiatric/Behavioral: Negative for dysphoric mood  The patient is not nervous/anxious  All other systems reviewed and are negative       Past Medical History:     Past Medical History:   Diagnosis Date   • Anxiety    • Arthritis     oa cassandra knees   • Asthma     good control- no medications   • Yan's esophagus    • Bipolar affective disorder ([facility] )    • Chronic pain disorder     lumbar   • COPD (chronic obstructive pulmonary disease) ([facility])    • CPAP (continuous positive airway pressure) dependence    • Degenerative disc disease at L5-S1 level    • Deliberate self-cutting     9/15/22per pt has not done recently-- does see a therapist and psychiatrist regularly   • Depression 09/16/2008   • Disease of thyroid gland     hypo   • MARTINEZ (dyspnea on exertion)     per pt "has had this with exertion and not new"   • Drug overdose 10/28/2008    suicide attempt and again drug overdose 7/2022-- [facility] floor and than transferred to [facility]   • Dysphagia    • Dyspnea    • Edema     BLE   • Family history of blood clots    • GERD (gastroesophageal reflux disease)    • Headache(784 0)    • High blood pressure    • History of COVID-19 12/30/2020    Symptoms started on 12/30/2020 and then got worse  Today she is feeling a little bit better  She is a candidate for monoclonal antibody infusion and set for 1/6/21 @ 1pm at [facility]  01/07/21 - telemedicine -    • Hyperlipidemia    • Hypertension    • Knee pain, bilateral     Especially right   • Medical marijuana use    • Memory loss    • Migraines    • Obesity    • Overactive bladder    • Sjogren's disease ([address] )    • Sleep apnea    • Stress incontinence    • Suicidal ideations    • Teeth missing    • Tremor     per pt Tremors of Right Leg and both Arms   • Visual impairment    • Wears glasses       Past Surgical History:     Past Surgical History:   Procedure Laterality Date   • BREAST CYST EXCISION Right 1989   • CARPAL TUNNEL RELEASE Left    • CHOLECYSTECTOMY  05/2003    Laparoscopic   • COLONOSCOPY      01/12/2009   • DILATION AND CURETTAGE OF UTERUS     • ELBOW SURGERY Left     x2   No hardware   • ESOPHAGOGASTRODUODENOSCOPY     • FOOT SURGERY Left     Plantar fasciotomy   • HERNIA REPAIR     • HYSTERECTOMY      laporoscopic, partial   • KNEE ARTHROSCOPY Bilateral    • OOPHORECTOMY Left 10/2015   • NY GASTROCNEMIUS RECESSION Left 02/24/2021    Procedure: RECESSION GASTROC OPEN;  Surgeon: Ely Ruzi DPM;  Location: [facility] MAIN OR;  Service: Podiatry   • NY RPR UMBILICAL HRNA 5 YRS/> REDUCIBLE N/A 04/24/2019    Procedure: REPAIR HERNIA UMBILICAL LAPAROSCOPIC W/ ROBOTICS;  Surgeon: Chad James MD;  Location: [facility] Main OR;  Service: General   • NY SPHINCTEROTOMY ANAL DIVISION SPHINCTER [facility] N/A 08/31/2018    Procedure: EUA, LEFT PARTIAL INTERNAL SPHINCTEROTOMY;  Surgeon: Torey England MD;  Location: [facility] MAIN OR;  Service: Colorectal   • [place] TNOT ELBOW LATERAL/MEDIAL DEBRIDE OPEN TDN RPR Left 09/20/2022    Procedure: RELEASE EPICONDYLAR ELBOW MEDIAL;  Surgeon: Warden Tank MD;  Location: [facility] MAIN OR;  Service: Orthopedics   • REDUCTION MAMMAPLASTY Bilateral    • REPAIR LACERATION Left     left hand-2009   • REPLACEMENT TOTAL KNEE Right    • ROTATOR CUFF REPAIR Left    • TONSILECTOMY AND ADNOIDECTOMY     • US GUIDED MSK PROCEDURE  2021   • VEIN LIGATION Bilateral    • WISDOM TOOTH EXTRACTION        Social History:     Social History     Socioeconomic History   • Marital status: Single     Spouse name: None   • Number of children: None   • Years of education: None   • Highest education level: None   Occupational History   • None   Tobacco Use   • Smoking status: Former     Packs/day: 2 00     Years: 30 00     Pack years: 60 00     Types: Cigarettes     Start date: 5     Quit date: 2018     Years since quittin 0   • Smokeless tobacco: Never   • Tobacco comments:     Started at age 14  Vaping Use   • Vaping Use: Some days   • Substances: THC   Substance and Sexual Activity   • Alcohol use: Not Currently     Comment: Recovering alcoholic   • Drug use: Not Currently     Types: Marijuana, Methamphetamines     Comment: Medical marijuana   • Sexual activity: Not Currently     Partners: Male     Comment: defer   Other Topics Concern   • None   Social History Narrative   • None     Social Determinants of Health     Financial Resource Strain: High Risk   • Difficulty of Paying Living Expenses: Hard   Food Insecurity: Not on file   Transportation Needs: No Transportation Needs   • Lack of Transportation (Medical): No   • Lack of Transportation (Non-Medical):  No   Physical Activity: Not on file   Stress: Not on file   Social Connections: Not on file   Intimate Partner Violence: Not on file   Housing Stability: Not on file      Family History:     Family History   Problem Relation Age of Onset   • Kidney cancer Mother    • Diabetes Mother    • Depression Mother    • Stroke Mother    • Arthritis Mother    • Cancer Mother    • Psychiatric Illness Mother    • No Known Problems Father    • No Known Problems Sister    • No Known Problems Maternal Grandmother    • No Known Problems Maternal Grandfather    • No Known Problems Paternal Grandmother    • No Known Problems Paternal Grandfather    • Colon cancer Maternal Uncle    • Colon cancer Maternal Uncle    • Colon cancer Paternal Uncle    • Colon cancer Family    • Alcohol abuse Sister    • Asthma Sister       Current Medications:     Current Outpatient Medications   Medication Sig Dispense Refill   • albuterol (Ventolin HFA) 90 mcg/act inhaler Inhale 2 puffs every 6 (six) hours as needed for wheezing 18 g 5   • amLODIPine (NORVASC) 5 mg tablet TAKE 1 TABLET (5 MG TOTAL) BY MOUTH DAILY 90 tablet 1   • buPROPion (WELLBUTRIN XL) 300 mg 24 hr tablet Take 300 mg by mouth daily     • carBAMazepine (CARBATROL) 300 MG 12 hr capsule Take 1 capsule by mouth in the morning     • Cholecalciferol (Vitamin D3) 125 MCG (5000 UT) CAPS Take by mouth in the morning     • divalproex sodium (DEPAKOTE ER) 250 mg 24 hr tablet      • divalproex sodium (DEPAKOTE ER) 500 mg 24 hr tablet 500 mg 2 (two) times a day     • estradiol (ESTRACE) 0 5 MG tablet TAKE 1 TABLET (0 5 MG TOTAL) BY MOUTH DAILY 30 tablet 0   • Fesoterodine Fumarate ER (Toviaz) 4 MG TB24 Take 1 tablet by mouth daily     • fluticasone (FLOVENT HFA) 110 MCG/ACT inhaler Inhale 2 puffs as needed Rinse mouth after use       • furosemide (LASIX) 20 mg tablet TAKE 1 TABLET BY MOUTH DAILY 30 tablet 2   • haloperidol decanoate (Haldol Decanoate) 50 mg/mL injection every 30 (thirty) days     • hydrocortisone 2 5 % ointment Apply topically 2 (two) times a day 30 g 0   • levothyroxine 112 mcg tablet Take 1 tablet (112 mcg total) by mouth daily in the early morning 90 tablet 0   • losartan (COZAAR) 50 mg tablet TAKE ONE TABLET BY MOUTH DAILY 30 tablet 10   • Lurasidone HCl 60 MG TABS Take 1 tablet (60 mg total) by mouth 2 (two) times a day TAKEN IN THE MORNING AND AT NIGHT-----Latuda 60 tablet 0   • metoprolol tartrate (LOPRESSOR) 25 mg tablet TAKE ONE TABLET BY MOUTH EVERY 12 HOURS 60 tablet 3   • pilocarpine (SALAGEN) 5 mg tablet Take 5 mg by mouth 2 (two) times a day     • RABEprazole (ACIPHEX) 20 MG tablet Take 20 mg by mouth 2 (two) times a day     • rOPINIRole (REQUIP) 0 5 mg tablet Take by mouth daily at bedtime     • rOPINIRole (REQUIP) 1 mg tablet      • topiramate (TOPAMAX) 100 mg tablet Take 1 5 tablets (150 mg total) by mouth 2 (two) times a day 90 tablet 0   • topiramate (TOPAMAX) 200 MG tablet      • topiramate (TOPAMAX) 50 MG tablet Take 50 mg by mouth 2 (two) times a day     • traZODone (DESYREL) 50 mg tablet Take 1 tablet (50 mg total) by mouth daily at bedtime 30 tablet 0   • trospium chloride (SANCTURA) 20 mg tablet Take 20 mg by mouth 2 (two) times a day     • buPROPion (WELLBUTRIN XL) 150 mg 24 hr tablet Take 1 tablet (150 mg total) by mouth daily 30 tablet 0   • colestipol (COLESTID) 1 g tablet Take 1 tablet (1 g total) by mouth 2 (two) times a day 60 tablet 0   • divalproex sodium (DEPAKOTE) 250 mg EC tablet Take 3 tablets (750 mg total) by mouth every 12 (twelve) hours 180 tablet 0   • hydrOXYzine HCL (ATARAX) 25 mg tablet Take 1 tablet (25 mg total) by mouth every 6 (six) hours as needed for itching (mild anxiety) 60 tablet 0   • venlafaxine (EFFEXOR) 37 5 mg tablet Take 37 5 mg by mouth daily       No current facility-administered medications for this visit  Allergies: Allergies   Allergen Reactions   • Percocet [Oxycodone-Acetaminophen] Headache     Severe headaches   (denies issues with Tylenol)   • Povidone Iodine Rash     Unsure if betadine or gauze post surgical  Got rash on leg     Has  Had itchy rashes after every surgery prep and IV insertion   • Chlorhexidine Rash     Per pt "able to use the liquid soap--thinkd reaction from the sponges or wipes"      Physical Exam:     /82 (BP Location: Left arm, Patient Position: Sitting, Cuff Size: Large)   Pulse 69   Temp (!) 96 8 °F (36 °C) (Temporal)  5' 5" (1 651 m)   Wt 131 kg (289 lb 12 8 oz)   SpO2 98%   BMI 48 23 kg/m²     Physical Exam  Vitals and nursing note reviewed  Constitutional:       General: She is not in acute distress  Appearance: Normal appearance  She is well-developed  HENT:      Head: Normocephalic and atraumatic  Right Ear: Tympanic membrane, ear canal and external ear normal  There is no impacted cerumen  Left Ear: Tympanic membrane, ear canal and external ear normal  There is no impacted cerumen  Nose: Nose normal       Mouth/Throat:      Mouth: Mucous membranes are moist    Eyes:      Extraocular Movements: Extraocular movements intact  Conjunctiva/sclera: Conjunctivae normal       Pupils: Pupils are equal, round, and reactive to light  Cardiovascular:      Rate and Rhythm: Normal rate and regular rhythm  Pulses: Normal pulses  Heart sounds: Normal heart sounds  No murmur heard  Pulmonary:      Effort: Pulmonary effort is normal  No respiratory distress  Breath sounds: Normal breath sounds  Abdominal:      General: Bowel sounds are normal       Palpations: Abdomen is soft  Tenderness: There is no abdominal tenderness  Musculoskeletal:         General: Normal range of motion  Cervical back: Normal range of motion and neck supple  Right lower leg: No edema  Left lower leg: No edema  Skin:     General: Skin is warm and dry  Capillary Refill: Capillary refill takes less than 2 seconds  Neurological:      General: No focal deficit present  Mental Status: She is alert and oriented to person, place, and time  Mental status is at baseline  Psychiatric:         Mood and Affect: Mood normal          Behavior: Behavior normal          Thought Content:  Thought content normal          Judgment: Judgment normal          Ear cerumen removal    Date/Time: 1/26/2023 10:30 AM  Performed by: JHONATAN Fraser  Authorized by: JHONATAN Fraser   Universal Protocol:  Consent: Verbal consent obtained  Patient understanding: patient states understanding of the procedure being performed  Patient identity confirmed: verbally with patient      Patient location:  Clinic  Procedure details:     Location:  R ear    Procedure type: irrigation with instrumentation      Instrumentation: curette      Approach:  External  Post-procedure details:     Complication:  None    Hearing quality:  Normal    Patient tolerance of procedure:   Tolerated well, no immediate complications          Prosper Cornelius, [address]

## 2023-01-26 NOTE — PATIENT INSTRUCTIONS

## 2023-01-26 NOTE — PROGRESS NOTES
OT Evaluation     Today's date: 2023  Patient name: Gurvinder Epps  : 1970  MRN: [id number]  Referring provider: ROMAN Nair*  Dx:   Encounter Diagnosis     ICD-10-CM    1  Flexor tenosynovitis of finger  M65 9       2  Finger stiffness, left  M25 642                      Assessment  Assessment details: Cassie Later presented as 6 months s/p tendon release in L LF, with stiffness and pain with functional activities   and pinch strength WFL  L LF AROM WFL, with noted tightness in extension  Reported tolerable  tightness and soreness during activities in LF and FDS/P tendon  Reports limitations in leisure activity participation (guitar playing) due to LF stiffness  Cassie Later was provided with a HEP for extension and  exercises  Tolerated treatment activities well  Pt advised to follow up in 2 weeks  Impairments: abnormal or restricted ROM, activity intolerance, lacks appropriate home exercise program and pain with function    Goals  STG 1: Increase digit AROM by 25% 4-6 weeks  STG 2: Decrease overall pain to 2/10 in 4-6 weeks  STG 3: Increase overall strength by 25% in 4-6 weeks  STG 4: Compliant with HEP in 2 weeks  LTG 1: Complete all ADL/IADLs improved to prior level of function within 6-8 weeks  LTG 2: Leisure/social skills improved within 6-8 weeks  LTG 3: Work skills improved to prior level of function within 6-8 weeks    Patient Goal: To play guitar        Plan  Patient would benefit from: skilled occupational therapy  Planned modality interventions: thermotherapy: hydrocollator packs  Planned therapy interventions: manual therapy, therapeutic exercise, functional ROM exercises, therapeutic activities, graded activity, graded exercise, home exercise program and patient education  Treatment plan discussed with: patient        Subjective Evaluation    Pain  Current pain rating: 3  At best pain rating: 3  At worst pain ratin  Quality: tight and discomfort    Hand dominance: right          Objective     Active Range of Motion     Left Wrist   Normal active range of motion    Left Digits    Flexion   Middle     MCP: 90    PIP: 85    DIP: 75  Extension   Middle     MCP: 19    PIP: 15    DIP: 11    Passive Range of Motion     Left Digits   Extension   Middle     MCP: -7    Strength/Myotome Testing     Left Wrist/Hand   Normal wrist strength     (2nd hand position)     Trial 1: 68    Trial 2: 64 3    Thumb Strength  Key/Lateral Pinch     Trial 1: 14  Tip/Two-Point Pinch     Trial 1: 8  Palmar/Three-Point Pinch     Trial 1: 12    Right Wrist/Hand      (2nd hand position)     Trial 1: 66 5    Trial 2: 64 3    Thumb Strength   Key/Lateral Pinch     Trial 1: 15  Tip/Two-Point Pinch     Trial 1: 11  Palmar/Three-Point Pinch     Trial 1: 13             Precautions: Universal      Manuals 1/26            IASTM- scar             Scar mob                                       Neuro Re-Ed                                                                                                        Ther Ex             Gross grasp G PW pwr 2x10            Digit extension stretch R FB, hold 10 sec, 10x, yellow ball hold 10sec 10x            pinch G/G pinch 2x                                                                             Ther Activity                                       Gait Training                                       Modalities             paraffin             MHP

## 2023-01-26 NOTE — ASSESSMENT & PLAN NOTE
Today's /82   Current medication: metoprolol 25mg, losartan 50mg, lasix 20mg, amlodipine 5mg   Advised patient on symptoms of hypotension & severe HTN  Limit salt-intake & caffeine   DASH diet discussed, minimize stress level  Weight reduction efforts via improved diet & increased exercise

## 2023-01-26 NOTE — ASSESSMENT & PLAN NOTE
Labs reviewed   · TSH 10 6, T4 0 87  Current medication: Levothyroxine 112 mcg  TSH order already in system

## 2023-02-09 ENCOUNTER — EVALUATION (OUTPATIENT)
Dept: PHYSICAL THERAPY | Age: 53
End: 2023-02-09

## 2023-02-09 ENCOUNTER — OFFICE VISIT (OUTPATIENT)
Dept: FAMILY MEDICINE CLINIC | Facility: CLINIC | Age: 53
End: 2023-02-09

## 2023-02-09 ENCOUNTER — OFFICE VISIT (OUTPATIENT)
Dept: OCCUPATIONAL THERAPY | Age: 53
End: 2023-02-09

## 2023-02-09 VITALS
HEIGHT: 65 IN | OXYGEN SATURATION: 98 % | TEMPERATURE: 97.2 F | HEART RATE: 102 BPM | DIASTOLIC BLOOD PRESSURE: 90 MMHG | BODY MASS INDEX: 48.23 KG/M2 | SYSTOLIC BLOOD PRESSURE: 172 MMHG

## 2023-02-09 DIAGNOSIS — G47.00 INSOMNIA, UNSPECIFIED TYPE: ICD-10-CM

## 2023-02-09 DIAGNOSIS — M25.551 RIGHT HIP PAIN: ICD-10-CM

## 2023-02-09 DIAGNOSIS — R30.0 DYSURIA: Primary | ICD-10-CM

## 2023-02-09 DIAGNOSIS — M25.642 FINGER STIFFNESS, LEFT: ICD-10-CM
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
## 2023-02-09 DIAGNOSIS — M25.551 RIGHT HIP PAIN: Primary | ICD-10-CM

## 2023-02-09 DIAGNOSIS — M65.9 FLEXOR TENOSYNOVITIS OF FINGER: Primary | ICD-10-CM

## 2023-02-09 LAB
SL AMB  POCT GLUCOSE, UA: NORMAL
SL AMB LEUKOCYTE ESTERASE,UA: NORMAL
SL AMB POCT BILIRUBIN,UA: NORMAL
SL AMB POCT BLOOD,UA: NORMAL
SL AMB POCT CLARITY,UA: CLEAR
SL AMB POCT COLOR,UA: YELLOW
SL AMB POCT KETONES,UA: 5
SL AMB POCT NITRITE,UA: NORMAL
SL AMB POCT PH,UA: 6
SL AMB POCT SPECIFIC GRAVITY,UA: 1.03
SL AMB POCT URINE PROTEIN: 300
SL AMB POCT UROBILINOGEN: 0.2

## 2023-02-09 NOTE — PROGRESS NOTES
PT Evaluation     Today's date: 2023  Patient name: Mary Young  : 1970  MRN: [id number]  Referring provider: JHONATAN Mckeon  Dx:   Encounter Diagnosis     ICD-10-CM    1  Right hip pain  M25 551                      Assessment  Assessment details: Patient seen for PT evaluation for R hip pain  Patient presents with decreased ROM, strength, no formal HEP to address deficits  PT administered HEP for gentle stretching and strengthening exercises  Recommending 1-2x/week x 2-4 weeks  Impairments: abnormal or restricted ROM, activity intolerance, impaired physical strength, lacks appropriate home exercise program, pain with function, poor posture  and poor body mechanics  Functional limitations: Patient reports hip pain with twisting, standing, walkingUnderstanding of Dx/Px/POC: good   Prognosis: good    Goals  Impairment Goals to be met within 4 weeks  - Decrease pain by 25% with activity  - Improve ROM to [facility] R hip ER  - Increase strength by 1/2 MMT throughout       Functional Goals to be met within 4-6 weeks  - Patient will be independent with HEP  - Reduce hip pain with twisting and turning  Plan  Patient would benefit from: skilled physical therapy  Planned modality interventions: cryotherapy and thermotherapy: hydrocollator packs  Planned therapy interventions: abdominal trunk stabilization, joint mobilization, manual therapy, neuromuscular re-education, patient education, postural training, strengthening, stretching, therapeutic activities, therapeutic exercise, home exercise program, gait training, body mechanics training and balance  Frequency: 1x week  Duration in weeks: 4  Treatment plan discussed with: family        Subjective Evaluation    History of Present Illness  Mechanism of injury:  Patient reports onset of R hip pain starting within the last 6 months, progressively worsening pain   Patient notes that her pain is worse with ER position (which she does during her sleep) and with twisting both sitting and standing  Patient notes that she's often twisting to the L side - as her recliner is set up in her home  Patient is visibly upset during evaluation, did receive a phone call, is worried about her psych meds, not wanting to go to the hospital at this time and feels that her psych team hasn't yet reached out to her about her psych meds  Patient does feel well enough to continue with PT evaluation for R hip pain    Pain  Current pain rating: 3  At best pain rating: 3  At worst pain ratin  Location: R hip   Quality: dull ache and sharp  Aggravating factors: sitting, standing, walking and stair climbing  Progression: no change    Social Support    Employment status: working    Diagnostic Tests  X-ray: normal  Treatments  Previous treatment: occupational therapy  Patient Goals  Patient goals for therapy: decreased pain, increased motion, increased strength and independence with ADLs/IADLs          Objective     Active Range of Motion   Left Hip   Normal active range of motion  Flexion: with pain  External rotation (90/90): with pain    Right Hip   Normal active range of motion  Left Knee   Normal active range of motion    Right Knee   Normal active range of motion  Left Ankle/Foot   Normal active range of motion    Right Ankle/Foot   Normal active range of motion    Additional Active Range of Motion Details  L hip pain with flexion and limited with ER on L compared to R    Strength/Myotome Testing     Left Hip   Planes of Motion   Flexion: 4  Extension: 4-  Abduction: 4  Adduction: 4    Right Hip   Planes of Motion   Flexion: 4  Extension: 4-  Abduction: 4  Adduction: 4    Left Knee   Flexion: 4  Extension: 4    Right Knee   Flexion: 4  Extension: 4    Left Ankle/Foot   Dorsiflexion: 5  Plantar flexion: 5    Right Ankle/Foot   Dorsiflexion: 5  Plantar flexion: 5             Precautions: psych history  Supine with wedge    Manuals  Neuro Re-Ed                sidestepping        Step ups FW, lateral        squats                                Ther Ex                Nu step                IT band stretch        Ham stretch        Standing hip flexor stretch                bridges        SAQ        SLR flex        LAQ                SLR hip abd, stand        Ham curls        HR                                Ther Activity                        Gait Training                        Modalities        MH vs CP PRN                Progress as able

## 2023-02-09 NOTE — PROGRESS NOTES
Daily Note     Today's date: 2023  Patient name: Pattie Klely  : 1970  MRN: [id number]  Referring provider: ROMAN Han*  Dx:   Encounter Diagnosis     ICD-10-CM    1  Flexor tenosynovitis of finger  M65 9       2  Finger stiffness, left  M25 642                      Subjective: ***      Objective: See treatment diary below      Assessment: Tolerated treatment {Tolerated treatment :6054352626}   Patient {assessment:}      Plan: {PLAN:[id number]}     Precautions: Universal      Manuals            IASTM- scar  10'           Scar mob                                       Neuro Re-Ed                                                                                                        Ther Ex             Gross grasp G PW pwr 2x10 GPW, pwr 2x10           Digit extension stretch R FB, hold 10 sec, 10x, yellow ball hold 10sec 10x R FB, hold 10 sec, 10x;   yellow ball hold 10 sec 10x           pinch G/G pinch 2x G/G pinch 2x                                                                            Ther Activity                                       Gait Training                                       Modalities             paraffin             MHP

## 2023-02-09 NOTE — ASSESSMENT & PLAN NOTE
When PCP entered room patient was uncontrollably sobbing in the corner on a chair  There is a noticeable white bandage on her left forearm  · Patient states that she cuts her forearms to "dull the pain" but never cuts "too deep that causes harm"  · Refused to show provider arm wounds  Past medical history    · Deliberate self-harm with ingestion of poisonous substances, major depressive disorder, schizoaffective disorder, suicidal ideations, bipolar, borderline personality disorder and depression  At this point in the visit PCP advised that she goes to the hospital to seek a higher level of care  · Patient declined  · PCP left the make appropriate phone calls when patient stormed out of room and left the office  Time spent with patient 30 minutes  [address] [facility] was called  · Spoke to [place] who informed me that on 2/8 patient called [facility] uncontrollably sobbing  · Patient spoke to supervisor suggesting that she goes to the hospital for higher level of care at which point the patient hung up on her  1400 crisis intervention at [facility] was called  · Since patient lives in [facility] they redirected me to [facility] crisis intervention  1415 crisis intervention at [facility] was called  · Spoke to representative MEET Guerra about concerns of patient's mental health condition  · Appropriate steps are in place to contact patient    [phone] crisis intervention at [facility] called office    · They were directly able to contact patient and speak to her regarding her status  · She denies suicidal ideation  · She was advised to follow-up with psychiatry as they are the ones to prescribe her medications since she mentioned to crisis that they are no longer helping her

## 2023-02-09 NOTE — PROGRESS NOTES
OT Re-Evaluation     Today's date: 2023  Patient name: Indio Ohara  : 1970  MRN: [id number]  Referring provider: ROMAN Russo*  Dx:   Encounter Diagnosis     ICD-10-CM    1  Flexor tenosynovitis of finger  M65 9       2  Finger stiffness, left  M25 642                      Assessment  Assessment details: Solomon Gamboa presented as 6 months s/p tendon release in L LF, with stiffness and pain with functional activities   and pinch strength WFL  L LF AROM WFL, with noted tightness in extension  Reported tolerable  tightness and soreness during activities in LF and FDS/P tendon  Reports limitations in leisure activity participation ([employer] playing) due to LF stiffness  Solomon Gamboa was provided with a HEP for extension and  exercises  Tolerated treatment activities well  Pt advised to follow up in 2 weeks  Vonnie Ata reports continued stiffness in L LF  She experiences stiffness during functional and work activity that does not interfere with function  Solomon Gamboa displayed Improved strength in hand as well as improved ROM  Her pain has been the same and she reports no use of relieving factors  She has full flexion and WFL extension of her L LF  She reports partial compliance with HEP, 1-2x per day  Pt advised to continue HEP and to try using a heat pad to relieve stiffness  She tolerated treatment activities well today  Impairments: abnormal or restricted ROM, activity intolerance, lacks appropriate home exercise program and pain with function    Goals  STG 1: Increase digit AROM by 25% 4-6 weeks  STG 2: Decrease overall pain to 2/10 in 4-6 weeks  STG 3: Increase overall strength by 25% in 4-6 weeks  STG 4: Compliant with HEP in 2 weeks  LTG 1: Complete all ADL/IADLs improved to prior level of function within 6-8 weeks  LTG 2: Leisure/social skills improved within 6-8 weeks  LTG 3: Work skills improved to prior level of function within 6-8 weeks    Patient Goal: To play guitar        Plan  Patient would benefit from: skilled occupational therapy  Planned modality interventions: thermotherapy: hydrocollator packs  Planned therapy interventions: manual therapy, therapeutic exercise, functional ROM exercises, therapeutic activities, graded activity, graded exercise, home exercise program and patient education  Treatment plan discussed with: patient        Subjective Evaluation    Pain  Current pain ratin  At best pain ratin  At worst pain ratin  Quality: tight and discomfort    Hand dominance: right          Objective     Active Range of Motion     Left Wrist   Normal active range of motion    Left Digits    Flexion   Middle     MCP: 90    PIP: 90    DIP: 85  Extension   Middle     MCP: 20    PIP: -10    DIP: 0    Passive Range of Motion     Left Digits   Extension   Middle     MCP: -7    Strength/Myotome Testing     Left Wrist/Hand   Normal wrist strength     (2nd hand position)     Trial 1: 66 3    Trial 2: 74 3    Thumb Strength  Key/Lateral Pinch     Trial 1: 18  Tip/Two-Point Pinch     Trial 1: 12  Palmar/Three-Point Pinch     Trial 1: 15    Right Wrist/Hand      (2nd hand position)     Trial 1: 75 7    Trial 2: 71 9    Thumb Strength   Key/Lateral Pinch     Trial 1: 15  Tip/Two-Point Pinch     Trial 1: 11  Palmar/Three-Point Pinch     Trial 1: 13             Precautions: Universal      Manuals            IASTM- scar             Scar mob  10           PROM  10                        Neuro Re-Ed                                                                                                        Ther Ex             Gross grasp G PW pwr 2x10 GPW pwr 3x10           Digit extension stretch R FB, hold 10 sec, 10x, yellow ball hold 10sec 10x GRB, hold 10 sec 10x           pinch G/G pinch 2x                                                                             Ther Activity                                       Gait Training                                       Modalities paraffin             P

## 2023-02-09 NOTE — PROGRESS NOTES
Name: Kalie Meneses      : 1970      MRN: [id number]  Encounter Provider: JHONATAN Vieira  Encounter Date: 2023   Encounter department: [facility]     1  Dysuria  Comments:  PCOT urine negative  Admitted to inadequate consumption of fluids and poor hygiene  Increase fluids and proper hygiene encouraged  Orders:  -     POCT urine dip    2  Intentional self-harm by unspecified sharp object, sequela [facility])  Assessment & Plan:  When PCP entered room patient was uncontrollably sobbing in the corner on a chair  There is a noticeable white bandage on her left forearm  · Patient states that she cuts her forearms to "dull the pain" but never cuts "too deep that causes harm"  · Refused to show provider arm wounds  Past medical history    · Deliberate self-harm with ingestion of poisonous substances, major depressive disorder, schizoaffective disorder, suicidal ideations, bipolar, borderline personality disorder and depression  At this point in the visit PCP advised that she goes to the hospital to seek a higher level of care  · Patient declined  · PCP left the make appropriate phone calls when patient stormed out of room and left the office  Time spent with patient 30 minutes  [address] [facility] was called  · Spoke to [place] who informed me that on  patient called [facility] uncontrollably sobbing  · Patient spoke to supervisor suggesting that she goes to the hospital for higher level of care at which point the patient hung up on her  1400 crisis intervention at [facility] was called  · Since patient lives in [facility] they redirected me to [facility] crisis intervention  1415 crisis intervention at [facility] was called  · Spoke to representative MEET Guerra about concerns of patient's mental health condition  · Appropriate steps are in place to contact patient    [phone] crisis intervention at [facility] called office  · They were directly able to contact patient and speak to her regarding her status  · She denies suicidal ideation  · She was advised to follow-up with psychiatry as they are the ones to prescribe her medications since she mentioned to crisis that they are no longer helping her      BMI Counseling: There is no height or weight on file to calculate BMI  The BMI is above normal  Nutrition recommendations include decreasing portion sizes, encouraging healthy choices of fruits and vegetables, decreasing fast food intake, consuming healthier snacks, limiting drinks that contain sugar, moderation in carbohydrate intake and reducing intake of saturated and trans fat  Exercise recommendations include exercising 3-5 times per week  No pharmacotherapy was ordered  Rationale for BMI follow-up plan is due to patient being overweight or obese  Subjective      Presents with complaints of dysuria that has been ongoing for more than one week  Attributed to poor fluid intake and hygiene  Patient was also uncontrollably sobbing when first entering the room  She is having housing difficulties in addition to being put on new psych medication and has had an influx of depressive symptoms  Urinary Tract Infection   This is a recurrent problem  The current episode started in the past 7 days  The problem occurs every urination  The problem has been gradually worsening  The quality of the pain is described as burning  The pain is at a severity of 5/10  The pain is moderate  There has been no fever  She is not sexually active  There is no history of pyelonephritis  Pertinent negatives include no chills, discharge, flank pain, frequency, hematuria, hesitancy, nausea, possible pregnancy, sweats, urgency or vomiting  She has tried nothing for the symptoms  The treatment provided no relief  Her past medical history is significant for recurrent UTIs         More than half of this 20 minute visit involved counseling and coordination of care       Review of Systems   Constitutional: Negative for activity change, chills, fatigue and fever  HENT: Negative for congestion, ear pain, rhinorrhea, sore throat and trouble swallowing  Eyes: Negative for pain and visual disturbance  Respiratory: Negative for cough, chest tightness and shortness of breath  Cardiovascular: Negative for chest pain, palpitations and leg swelling  Gastrointestinal: Negative for abdominal pain, constipation, diarrhea, nausea and vomiting  Genitourinary: Negative for difficulty urinating, dysuria, flank pain, frequency, hematuria, hesitancy and urgency  Musculoskeletal: Negative for arthralgias and back pain  Skin: Negative for color change and rash  Neurological: Negative for dizziness, seizures, syncope and headaches  Psychiatric/Behavioral: Negative for dysphoric mood  The patient is not nervous/anxious  All other systems reviewed and are negative  Current Outpatient Medications on File Prior to Visit   Medication Sig   • albuterol (Ventolin HFA) 90 mcg/act inhaler Inhale 2 puffs every 6 (six) hours as needed for wheezing   • amLODIPine (NORVASC) 5 mg tablet TAKE 1 TABLET (5 MG TOTAL) BY MOUTH DAILY   • buPROPion (WELLBUTRIN XL) 300 mg 24 hr tablet Take 300 mg by mouth daily   • carBAMazepine (CARBATROL) 300 MG 12 hr capsule Take 1 capsule by mouth in the morning   • Cholecalciferol (Vitamin D3) 125 MCG (5000 UT) CAPS Take by mouth in the morning   • divalproex sodium (DEPAKOTE ER) 250 mg 24 hr tablet    • divalproex sodium (DEPAKOTE ER) 500 mg 24 hr tablet 500 mg 2 (two) times a day   • estradiol (ESTRACE) 0 5 MG tablet TAKE 1 TABLET (0 5 MG TOTAL) BY MOUTH DAILY   • Fesoterodine Fumarate ER (Toviaz) 4 MG TB24 Take 1 tablet by mouth daily   • fluticasone (FLOVENT HFA) 110 MCG/ACT inhaler Inhale 2 puffs as needed Rinse mouth after use     • furosemide (LASIX) 20 mg tablet TAKE 1 TABLET BY MOUTH DAILY   • haloperidol decanoate (Haldol Decanoate) 50 mg/mL injection every 30 (thirty) days   • hydrocortisone 2 5 % ointment Apply topically 2 (two) times a day   • levothyroxine 112 mcg tablet Take 1 tablet (112 mcg total) by mouth daily in the early morning   • losartan (COZAAR) 50 mg tablet TAKE ONE TABLET BY MOUTH DAILY   • Lurasidone HCl 60 MG TABS Take 1 tablet (60 mg total) by mouth 2 (two) times a day TAKEN IN THE MORNING AND AT NIGHT-----Latuda   • metoprolol tartrate (LOPRESSOR) 25 mg tablet TAKE ONE TABLET BY MOUTH EVERY 12 HOURS   • pilocarpine (SALAGEN) 5 mg tablet Take 5 mg by mouth 2 (two) times a day   • RABEprazole (ACIPHEX) 20 MG tablet Take 20 mg by mouth 2 (two) times a day   • rOPINIRole (REQUIP) 0 5 mg tablet Take by mouth daily at bedtime   • rOPINIRole (REQUIP) 1 mg tablet    • topiramate (TOPAMAX) 100 mg tablet Take 1 5 tablets (150 mg total) by mouth 2 (two) times a day   • topiramate (TOPAMAX) 200 MG tablet    • topiramate (TOPAMAX) 50 MG tablet Take 50 mg by mouth 2 (two) times a day   • traZODone (DESYREL) 50 mg tablet Take 1 tablet (50 mg total) by mouth daily at bedtime   • trospium chloride (SANCTURA) 20 mg tablet Take 20 mg by mouth 2 (two) times a day   • venlafaxine (EFFEXOR) 37 5 mg tablet Take 37 5 mg by mouth daily   • buPROPion (WELLBUTRIN XL) 150 mg 24 hr tablet Take 1 tablet (150 mg total) by mouth daily   • colestipol (COLESTID) 1 g tablet Take 1 tablet (1 g total) by mouth 2 (two) times a day   • divalproex sodium (DEPAKOTE) 250 mg EC tablet Take 3 tablets (750 mg total) by mouth every 12 (twelve) hours   • hydrOXYzine HCL (ATARAX) 25 mg tablet Take 1 tablet (25 mg total) by mouth every 6 (six) hours as needed for itching (mild anxiety)       Objective     BP (!) 172/90 (BP Location: Left arm, Patient Position: Sitting, Cuff Size: Adult)   Pulse 102   Temp (!) 97 2 °F (36 2 °C) (Temporal)   Ht 5' 5" (1 651 m)   SpO2 98%   BMI 48 23 kg/m²     Physical Exam  Constitutional: Appearance: Normal appearance  Pulmonary:      Effort: Pulmonary effort is normal    Skin:     General: Skin is warm  Neurological:      General: No focal deficit present  Mental Status: She is alert and oriented to person, place, and time  Mental status is at baseline  Psychiatric:         Mood and Affect: Mood is anxious and depressed  Affect is tearful  Behavior: Behavior is withdrawn  Thought Content: Thought content does not include suicidal ideation  Thought content does not include suicidal plan  Judgment: Judgment is not inappropriate         JHONATAN Meza

## 2023-02-10 ENCOUNTER — NURSE TRIAGE (OUTPATIENT)
Dept: OTHER | Facility: OTHER | Age: 53
End: 2023-02-10

## 2023-02-10 ENCOUNTER — APPOINTMENT (EMERGENCY)
Dept: RADIOLOGY | Facility: HOSPITAL | Age: 53
End: 2023-02-10

## 2023-02-10 ENCOUNTER — HOSPITAL ENCOUNTER (EMERGENCY)
Facility: HOSPITAL | Age: 53
Discharge: HOME | End: 2023-02-11
Attending: EMERGENCY MEDICINE

## 2023-02-10 VITALS
OXYGEN SATURATION: 96 % | WEIGHT: 290.35 LBS | HEART RATE: 71 BPM | SYSTOLIC BLOOD PRESSURE: 140 MMHG | RESPIRATION RATE: 18 BRPM | BODY MASS INDEX: 48.32 KG/M2 | DIASTOLIC BLOOD PRESSURE: 68 MMHG | TEMPERATURE: 98.4 F

## 2023-02-10 DIAGNOSIS — R07.9 CHEST PAIN: Primary | ICD-10-CM

## 2023-02-10 LAB
ALBUMIN SERPL BCP-MCNC: 3.8 G/DL (ref 3.5–5)
ALP SERPL-CCNC: 65 U/L (ref 34–104)
ALT SERPL W P-5'-P-CCNC: 37 U/L (ref 7–52)
ANION GAP SERPL CALCULATED.3IONS-SCNC: 8 MMOL/L (ref 4–13)
AST SERPL W P-5'-P-CCNC: 29 U/L (ref 13–39)
BASOPHILS # BLD AUTO: 0.03 THOUSANDS/ÂΜL (ref 0–0.1)
BASOPHILS NFR BLD AUTO: 0 % (ref 0–1)
BILIRUB SERPL-MCNC: 0.28 MG/DL (ref 0.2–1)
BUN SERPL-MCNC: 12 MG/DL (ref 5–25)
CALCIUM SERPL-MCNC: 9 MG/DL (ref 8.4–10.2)
CARDIAC TROPONIN I PNL SERPL HS: 4 NG/L
CHLORIDE SERPL-SCNC: 109 MMOL/L (ref 96–108)
CO2 SERPL-SCNC: 26 MMOL/L (ref 21–32)
CREAT SERPL-MCNC: 0.49 MG/DL (ref 0.6–1.3)
EOSINOPHIL # BLD AUTO: 0.09 THOUSAND/ÂΜL (ref 0–0.61)
EOSINOPHIL NFR BLD AUTO: 1 % (ref 0–6)
ERYTHROCYTE [DISTWIDTH] IN BLOOD BY AUTOMATED COUNT: 12.9 % (ref 11.6–15.1)
GFR SERPL CREATININE-BSD FRML MDRD: 113 ML/MIN/1.73SQ M
GLUCOSE SERPL-MCNC: 105 MG/DL (ref 65–140)
HCT VFR BLD AUTO: 41.3 % (ref 34.8–46.1)
HGB BLD-MCNC: 13.5 G/DL (ref 11.5–15.4)
IMM GRANULOCYTES # BLD AUTO: 0.03 THOUSAND/UL (ref 0–0.2)
IMM GRANULOCYTES NFR BLD AUTO: 0 % (ref 0–2)
LYMPHOCYTES # BLD AUTO: 2.35 THOUSANDS/ÂΜL (ref 0.6–4.47)
LYMPHOCYTES NFR BLD AUTO: 27 % (ref 14–44)
MCH RBC QN AUTO: 31 PG (ref 26.8–34.3)
MCHC RBC AUTO-ENTMCNC: 32.7 G/DL (ref 31.4–37.4)
MCV RBC AUTO: 95 FL (ref 82–98)
MONOCYTES # BLD AUTO: 0.86 THOUSAND/ÂΜL (ref 0.17–1.22)
MONOCYTES NFR BLD AUTO: 10 % (ref 4–12)
NEUTROPHILS # BLD AUTO: 5.51 THOUSANDS/ÂΜL (ref 1.85–7.62)
NEUTS SEG NFR BLD AUTO: 62 % (ref 43–75)
NRBC BLD AUTO-RTO: 0 /100 WBCS
PLATELET # BLD AUTO: 172 THOUSANDS/UL (ref 149–390)
PMV BLD AUTO: 10.4 FL (ref 8.9–12.7)
POTASSIUM SERPL-SCNC: 3.6 MMOL/L (ref 3.5–5.3)
PROT SERPL-MCNC: 6.7 G/DL (ref 6.4–8.4)
RBC # BLD AUTO: 4.35 MILLION/UL (ref 3.81–5.12)
SODIUM SERPL-SCNC: 143 MMOL/L (ref 135–147)
WBC # BLD AUTO: 8.87 THOUSAND/UL (ref 4.31–10.16)

## 2023-02-10 RX ORDER — TRAZODONE HYDROCHLORIDE 50 MG/1
TABLET ORAL
Qty: 30 TABLET | Refills: 0 | Status: SHIPPED | OUTPATIENT
Start: 2023-02-10

## 2023-02-11 NOTE — ED PROVIDER NOTES
History  Chief Complaint   Patient presents with   • Chest Pain     Pt comes from home via EMS with c/o of mid sternal chest pain, nausea, SOB  Pt has self-inflicted arm laceration on left arm that her primary doctor is aware of, denies SI/HI and states she feels safe at home  Pt states she is having trouble sleeping recently  Reports she hasn't taken her psych or blood pressure medication for the past 3 days  46 yo female with h/o anxiety, asthma, COPD, depression, hypothyroid, HTN, HLD presents with multiple complaints  Pt states that she has been under a lot of stress lately and feeling more depressed than baseline  Her doctor has been titrating her medication and making adjustments which is affecting how she is feeling, she also notes that it was the anniversary of her mother's death this past week which has made matters worse  Pt reports that she stopped taking her medication because of her poor mood and has made superficial cuts along the volar surface of her left arm  She reports that she has a history of this behavior when she is feeling anxious and stressed  She adamantly denies suicidal thoughts, gesture, AH/VH/HI  PT also reports her stomach feels off, she reports some nausea, vomiting and diarrhea over last several days as well as chest tightness and SOB  She is feeling better since shes been here  Denies LE pain/swelling, denies orthopnea/PND          History provided by:  Medical records and patient   used: No    Chest Pain  Pain location:  Substernal area  Pain quality: tightness    Pain radiates to:  Does not radiate  Pain radiates to the back: no    Pain severity:  Mild  Onset quality:  Gradual  Duration:  3 days  Timing:  Intermittent  Progression:  Waxing and waning  Chronicity:  New  Context: at rest and stress    Context: not breathing, no drug use, not eating, no intercourse, not lifting, no movement, not raising an arm and no trauma    Relieved by: Nothing  Worsened by:  Nothing tried  Ineffective treatments:  None tried  Associated symptoms: dizziness and shortness of breath    Associated symptoms: no abdominal pain, no back pain, no cough, no fever, no headache, no nausea, not vomiting and no weakness    Risk factors: high cholesterol, hypertension and obesity    Risk factors: no aortic disease, no birth control, no coronary artery disease, no diabetes mellitus, no Tiffanie-Danlos syndrome, no immobilization, not male, no Marfan's syndrome, not pregnant, no prior DVT/PE, no smoking and no surgery        Prior to Admission Medications   Prescriptions Last Dose Informant Patient Reported? Taking?    Cholecalciferol (Vitamin D3) 125 MCG (5000 UT) CAPS   Yes No   Sig: Take by mouth in the morning   Fesoterodine Fumarate ER (Toviaz) 4 MG TB24   Yes No   Sig: Take 1 tablet by mouth daily   Lurasidone HCl 60 MG TABS   No No   Sig: Take 1 tablet (60 mg total) by mouth 2 (two) times a day TAKEN IN THE MORNING AND AT NIGHT-----Latuda   RABEprazole (ACIPHEX) 20 MG tablet   Yes No   Sig: Take 20 mg by mouth 2 (two) times a day   albuterol (Ventolin HFA) 90 mcg/act inhaler   No No   Sig: Inhale 2 puffs every 6 (six) hours as needed for wheezing   amLODIPine (NORVASC) 5 mg tablet   No No   Sig: TAKE 1 TABLET (5 MG TOTAL) BY MOUTH DAILY   buPROPion (WELLBUTRIN XL) 150 mg 24 hr tablet   No No   Sig: Take 1 tablet (150 mg total) by mouth daily   buPROPion (WELLBUTRIN XL) 300 mg 24 hr tablet   Yes No   Sig: Take 300 mg by mouth daily   carBAMazepine (CARBATROL) 300 MG 12 hr capsule   Yes No   Sig: Take 1 capsule by mouth in the morning   colestipol (COLESTID) 1 g tablet   No No   Sig: Take 1 tablet (1 g total) by mouth 2 (two) times a day   divalproex sodium (DEPAKOTE ER) 250 mg 24 hr tablet   Yes No   divalproex sodium (DEPAKOTE ER) 500 mg 24 hr tablet   Yes No   Si mg 2 (two) times a day   divalproex sodium (DEPAKOTE) 250 mg EC tablet   No No   Sig: Take 3 tablets (750 mg total) by mouth every 12 (twelve) hours   estradiol (ESTRACE) 0 5 MG tablet   No No   Sig: TAKE 1 TABLET (0 5 MG TOTAL) BY MOUTH DAILY   fluticasone (FLOVENT HFA) 110 MCG/ACT inhaler   Yes No   Sig: Inhale 2 puffs as needed Rinse mouth after use     furosemide (LASIX) 20 mg tablet   No No   Sig: TAKE 1 TABLET BY MOUTH DAILY   haloperidol decanoate (Haldol Decanoate) 50 mg/mL injection   Yes No   Sig: every 30 (thirty) days   hydrOXYzine HCL (ATARAX) 25 mg tablet   No No   Sig: Take 1 tablet (25 mg total) by mouth every 6 (six) hours as needed for itching (mild anxiety)   hydrocortisone 2 5 % ointment   No No   Sig: Apply topically 2 (two) times a day   levothyroxine 112 mcg tablet   No No   Sig: Take 1 tablet (112 mcg total) by mouth daily in the early morning   losartan (COZAAR) 50 mg tablet   No No   Sig: TAKE ONE TABLET BY MOUTH DAILY   metoprolol tartrate (LOPRESSOR) 25 mg tablet   No No   Sig: TAKE ONE TABLET BY MOUTH EVERY 12 HOURS   pilocarpine (SALAGEN) 5 mg tablet   Yes No   Sig: Take 5 mg by mouth 2 (two) times a day   rOPINIRole (REQUIP) 0 5 mg tablet   Yes No   Sig: Take by mouth daily at bedtime   rOPINIRole (REQUIP) 1 mg tablet   Yes No   topiramate (TOPAMAX) 100 mg tablet   No No   Sig: Take 1 5 tablets (150 mg total) by mouth 2 (two) times a day   topiramate (TOPAMAX) 200 MG tablet   Yes No   topiramate (TOPAMAX) 50 MG tablet   Yes No   Sig: Take 50 mg by mouth 2 (two) times a day   traZODone (DESYREL) 50 mg tablet   No No   Sig: TAKE 1 TABLET BY MOUTH DAILY AT BEDTIME   trospium chloride (SANCTURA) 20 mg tablet   Yes No   Sig: Take 20 mg by mouth 2 (two) times a day   venlafaxine (EFFEXOR) 37 5 mg tablet   Yes No   Sig: Take 37 5 mg by mouth daily      Facility-Administered Medications: None       Past Medical History:   Diagnosis Date   • Anxiety    • Arthritis     oa cassandra knees   • Asthma     good control- no medications   • Yan's esophagus    • Bipolar affective disorder (HCC)    • Chronic pain disorder     lumbar   • COPD (chronic obstructive pulmonary disease) ([facility])    • CPAP (continuous positive airway pressure) dependence    • Degenerative disc disease at L5-S1 level    • Deliberate self-cutting     9/15/22per pt has not done recently-- does see a therapist and psychiatrist regularly   • Depression 09/16/2008   • Disease of thyroid gland     hypo   • MARTINEZ (dyspnea on exertion)     per pt "has had this with exertion and not new"   • Drug overdose 10/28/2008    suicide attempt and again drug overdose 7/2022--[facility] and than transferred to [facility]   • Dysphagia    • Dyspnea    • Edema     BLE   • Family history of blood clots    • GERD (gastroesophageal reflux disease)    • Headache(784 0)    • High blood pressure    • History of COVID-19 12/30/2020    Symptoms started on 12/30/2020 and then got worse  Today she is feeling a little bit better  She is a candidate for monoclonal antibody infusion and set for 1/6/21 @ 1pm at [facility]  01/07/21 - telemedicine -    • Hyperlipidemia    • Hypertension    • Knee pain, bilateral     Especially right   • Medical marijuana use    • Memory loss    • Migraines    • Obesity    • Overactive bladder    • Sjogren's disease ([address] )    • Sleep apnea    • Stress incontinence    • Suicidal ideations    • Teeth missing    • Tremor     per pt Tremors of Right Leg and both Arms   • Visual impairment    • Wears glasses        Past Surgical History:   Procedure Laterality Date   • BREAST CYST EXCISION Right 1989   • CARPAL TUNNEL RELEASE Left    • CHOLECYSTECTOMY  05/2003    Laparoscopic   • COLONOSCOPY      01/12/2009   • DILATION AND CURETTAGE OF UTERUS     • ELBOW SURGERY Left     x2   No hardware   • ESOPHAGOGASTRODUODENOSCOPY     • FOOT SURGERY Left     Plantar fasciotomy   • HERNIA REPAIR     • HYSTERECTOMY      laporoscopic, partial   • KNEE ARTHROSCOPY Bilateral    • OOPHORECTOMY Left 10/2015   • WV GASTROCNEMIUS RECESSION Left 02/24/2021 Procedure: RECESSION GASTROC OPEN;  Surgeon: Zoila Carcamo DPM;  Location: [address] [place] [address] MAIN OR;  Service: Podiatry   • AL RPR UMBILICAL HRNA 5 YRS/> REDUCIBLE N/A 04/24/2019    Procedure: REPAIR HERNIA UMBILICAL LAPAROSCOPIC W/ ROBOTICS;  Surgeon: Lenny Lopez MD;  Location: [facility] Main OR;  Service: General   • AL SPHINCTEROTOMY ANAL DIVISION SPHINCTER SPX N/A 08/31/2018    Procedure: EUA, LEFT PARTIAL INTERNAL SPHINCTEROTOMY;  Surgeon: Chhaya Gaxiola MD;  Location: [address] [place] [address] MAIN OR;  Service: Colorectal   • AL TNOT ELBOW LATERAL/MEDIAL DEBRIDE OPEN TDN RPR Left 09/20/2022    Procedure: RELEASE EPICONDYLAR ELBOW MEDIAL;  Surgeon: Bethany Cortez MD;  [facility];  Service: Orthopedics   • REDUCTION MAMMAPLASTY Bilateral 1999   • REPAIR LACERATION Left     left hand-5/18/2009   • REPLACEMENT TOTAL KNEE Right    • ROTATOR CUFF REPAIR Left    • TONSILECTOMY AND ADNOIDECTOMY     • US GUIDED MSK PROCEDURE  04/22/2021   • VEIN LIGATION Bilateral    • WISDOM TOOTH EXTRACTION         Family History   Problem Relation Age of Onset   • Kidney cancer Mother    • Diabetes Mother    • Depression Mother    • Stroke Mother    • Arthritis Mother    • Cancer Mother    • Psychiatric Illness Mother    • No Known Problems Father    • No Known Problems Sister    • No Known Problems Maternal Grandmother    • No Known Problems Maternal Grandfather    • No Known Problems Paternal Grandmother    • No Known Problems Paternal Grandfather    • Colon cancer Maternal Uncle    • Colon cancer Maternal Uncle    • Colon cancer Paternal Uncle    • Colon cancer Family    • Alcohol abuse Sister    • Asthma Sister      I have reviewed and agree with the history as documented      E-Cigarette/Vaping   • E-Cigarette Use Current Some Day User    • Comments medical THC      E-Cigarette/Vaping Substances   • Nicotine No    • THC Yes    • CBD No    • Flavoring No    • Other No    • Unknown No      Social History     Tobacco Use   • Smoking status: Former Packs/day: 2 00     Years: 30 00     Pack years: 60 00     Types: Cigarettes     Start date: 5     Quit date: 2018     Years since quittin 1   • Smokeless tobacco: Never   • Tobacco comments:     Started at age 14  Vaping Use   • Vaping Use: Some days   • Substances: THC   Substance Use Topics   • Alcohol use: Not Currently     Comment: Recovering alcoholic   • Drug use: Not Currently     Types: Marijuana, Methamphetamines     Comment: Medical marijuana       Review of Systems   Constitutional: Negative for chills and fever  HENT: Negative for congestion, rhinorrhea and sore throat  Eyes: Negative for visual disturbance  Respiratory: Positive for shortness of breath  Negative for cough  Cardiovascular: Positive for chest pain  Gastrointestinal: Negative for abdominal pain, diarrhea, nausea and vomiting  Genitourinary: Negative for dysuria, frequency and urgency  Musculoskeletal: Negative for back pain and gait problem  Neck stiffness: superficial cutting behavior  Skin: Negative for rash  Neurological: Positive for dizziness  Negative for syncope, speech difficulty, weakness and headaches  Psychiatric/Behavioral: Positive for self-injury  Negative for confusion and suicidal ideas  All other systems reviewed and are negative  Physical Exam  Physical Exam  Vitals and nursing note reviewed  Constitutional:       General: She is not in acute distress  Appearance: She is well-developed  She is not ill-appearing, toxic-appearing or diaphoretic  HENT:      Head: Normocephalic and atraumatic  Eyes:      Extraocular Movements: Extraocular movements intact  Conjunctiva/sclera: Conjunctivae normal       Pupils: Pupils are equal, round, and reactive to light  Cardiovascular:      Rate and Rhythm: Normal rate and regular rhythm  Pulses:           Radial pulses are 2+ on the right side and 2+ on the left side  Heart sounds: Normal heart sounds   No murmur heard   Pulmonary:      Effort: Pulmonary effort is normal  No tachypnea, accessory muscle usage or respiratory distress  Breath sounds: Normal breath sounds  No stridor  Abdominal:      Palpations: Abdomen is soft  Tenderness: There is no abdominal tenderness  There is no guarding or rebound  Musculoskeletal:         General: No deformity  Normal range of motion  Cervical back: Normal range of motion  Right lower leg: No tenderness  Left lower leg: No tenderness  Skin:     General: Skin is warm and dry  Capillary Refill: Capillary refill takes less than 2 seconds  Comments: Superficial linear cuts left volar forearm   Neurological:      Mental Status: She is alert and oriented to person, place, and time  Psychiatric:         Mood and Affect: Mood normal          Behavior: Behavior normal          Thought Content:  Thought content normal          Judgment: Judgment normal          Vital Signs  ED Triage Vitals [02/10/23 2050]   Temperature Pulse Respirations Blood Pressure SpO2   98 4 °F (36 9 °C) 76 18 141/82 94 %      Temp Source Heart Rate Source Patient Position - Orthostatic VS BP Location FiO2 (%)   Oral Monitor -- Right arm --      Pain Score       5           Vitals:    02/10/23 2050   BP: 141/82   Pulse: 76         Visual Acuity      ED Medications  Medications - No data to display    Diagnostic Studies  Results Reviewed     Procedure Component Value Units Date/Time    CBC and differential [[id number]] Collected: 02/10/23 2137    Lab Status: No result Specimen: Blood from Line, Venous     Comprehensive metabolic panel [[id number]] Collected: 02/10/23 2137    Lab Status: No result Specimen: Blood from Line, Venous     HS Troponin 0hr (reflex protocol) [[id number]] Collected: 02/10/23 2137    Lab Status: No result Specimen: Blood from Line, Venous                  XR chest 1 view portable    (Results Pending)              Procedures  ECG 12 Lead Documentation Only    Date/Time: 2/10/2023 9:49 PM  Performed by: Nader El MD  Authorized by: Nader El MD     Indications / Diagnosis:  Chest Pain  ECG reviewed by me, the ED Provider: yes    Patient location:  ED  Previous ECG:     Previous ECG:  Compared to current    Comparison ECG info:  8/7/22    Similarity:  No change  Interpretation:     Interpretation: normal    Rate:     ECG rate:  69 bpm    ECG rate assessment: normal    Rhythm:     Rhythm: sinus rhythm    Ectopy:     Ectopy: none    QRS:     QRS axis:  Normal  Conduction:     Conduction: normal    ST segments:     ST segments:  Normal  T waves:     T waves: normal    Comments:      No STEMI             ED Course  ED Course as of 02/11/23 0058   Fri Feb 10, 2023   2207 CBC and differential  No significant leukocytosis reassuring diff, normal H/H, normal platelets  2255 hs TnI 0hr: 4  wnl   2255 Comprehensive metabolic panel(!)  Reassuring, no end organ damage, no AG, normal bicarb  SBIRT 22yo+    Flowsheet Row Most Recent Value   SBIRT (25 yo +)    In order to provide better care to our patients, we are screening all of our patients for alcohol and drug use  Would it be okay to ask you these screening questions? Yes Filed at: 02/10/2023 2142   Initial Alcohol Screen: US AUDIT-C     1  How often do you have a drink containing alcohol? 0 Filed at: 02/10/2023 2142   2  How many drinks containing alcohol do you have on a typical day you are drinking? 0 Filed at: 02/10/2023 2142   3a  Male UNDER 65: How often do you have five or more drinks on one occasion? 0 Filed at: 02/10/2023 2142   3b  FEMALE Any Age, or MALE 65+: How often do you have 4 or more drinks on one occassion? 0 Filed at: 02/10/2023 2142   Audit-C Score 0 Filed at: 02/10/2023 2142   OLEG: How many times in the past year have you    Used an illegal drug or used a prescription medication for non-medical reasons?  Weekly Filed at: 02/10/2023 2142 DAST-10: In the past 12 months       1  Have you used drugs other than those required for medical reasons? 1 Filed at: 02/10/2023 2142   2  Do you use more than one drug at a time? 1  [marajuana, meth] Filed at: 02/10/2023 2142   3  Have you had medical problems as a result of your drug use (e g , memory loss, hepatitis, convulsions, bleeding, etc )? 0 Filed at: 02/10/2023 2142   4  Have you had "blackouts" or "flashbacks" as a result of drug use? YesNo 0 Filed at: 02/10/2023 2142   5  Do you ever feel bad or guilty about your drug use? 0 Filed at: 02/10/2023 2142   6  Does your spouse (or parent) ever complain about your involvement with drugs? 0 Filed at: 02/10/2023 2142   7  Have you neglected your family because of your use of drugs? 0 Filed at: 02/10/2023 2142   8  Have you engaged in illegal activities in order to obtain drugs? 0 Filed at: 02/10/2023 2142   9  Have you ever experienced withdrawal symptoms (felt sick) when you stopped taking drugs? 0 Filed at: 02/10/2023 2142   10  Are you always able to stop using drugs when you want to? 0 Filed at: 02/10/2023 2142   DAST-10 Score 2 Filed at: 02/10/2023 2142                    Medical Decision Making  46 yo female with multiple complaints including n/v/d/cp/sob in setting of increased stress/anxiety  Pt well appearing, no murmur, pulse differential, clinical signs of VTE or volume overload  Abdomen benign  Lung exam benign  No ambulatory tachycardia or hypoxia  Pt with very superficial self inflicted cuts to left volar forearm (no repair required)  Pt adamantly denies SI or gesture  No indication for 302  Pt with improved symptoms, reassuring work up safe for dispo to home  RTER precautions discussed and documented on discharge paperwork, pt and family endorsed good understanding of reasons to return  Chest pain: acute illness or injury  Amount and/or Complexity of Data Reviewed  External Data Reviewed: notes  Labs: ordered   Decision-making details documented in ED Course  Radiology: ordered and independent interpretation performed  ECG/medicine tests: ordered and independent interpretation performed  Risk  Decision regarding hospitalization  Disposition  Final diagnoses:   None     ED Disposition     None      Follow-up Information    None         Patient's Medications   Discharge Prescriptions    No medications on file       No discharge procedures on file      PDMP Review       Value Time User    PDMP Reviewed  Yes 9/20/2022 10:10 AM Joby Hadley PA-C          ED Provider  Electronically Signed by           Nader El MD  02/11/23 1331

## 2023-02-11 NOTE — TELEPHONE ENCOUNTER
Reason for Disposition  • Sounds like a life-threatening emergency to the triager    Answer Assessment - Initial Assessment Questions  1  LEVEL OF CONSCIOUSNESS: "How is he (she, the patient) acting right now?" (e g , alert-oriented, confused, lethargic, stuporous, comatose)        Confusion psych issues was not taking BP  meds feels sweaty SOB chest pain and lives alone  Feels like she will pass out  No one available to bring to ER   911 call placed   Cuts on arm from other day     Sweaty chest pain     2  ONSET: "When did the confusion start?"  (minutes, hours, days)      Tonight    3  PATTERN "Does this come and go, or has it been constant since it started?"  "Is it present now?"      Constant    4  ALCOHOL or DRUGS: "Has he been drinking alcohol or taking any drugs?"         unsure    5  NARCOTIC MEDICATIONS: "Has he been receiving any narcotic medications?" (e g , morphine, Vicodin)        Unsure    6  CAUSE: "What do you think is causing the confusion?"       Unsure has not taken BP meds in several days   7   OTHER SYMPTOMS: "Are there any other symptoms?" (e g , difficulty breathing, headache, fever, weakness)     SOB sweaty chest pain  Faint    Protocols used: CONFUSION - DELIRIUM-ADULT-

## 2023-02-11 NOTE — TELEPHONE ENCOUNTER
Patient called in complains of SOB , chest pain, feeling faint, sweaty  Reports has not taken BP HTN  meds for several days  Patient lives alone and has no one available to drive her to ER  911 call place d by triage nurse

## 2023-02-11 NOTE — TELEPHONE ENCOUNTER
Regarding: faint, shakiness, headache  ----- Message from Mavis Spencer sent at 2/10/2023  7:53 PM EST -----  "I'm real shaky and i feel faint, i havent been eating/drinking either and my head hurts so bad   i havent been taking my meds for the last 2-3 days, i did take them tonight i was going to start my new ones tomorrow  "

## 2023-02-12 LAB
ATRIAL RATE: 69 BPM
P AXIS: 46 DEGREES
PR INTERVAL: 174 MS
QRS AXIS: -21 DEGREES
QRSD INTERVAL: 88 MS
QT INTERVAL: 376 MS
QTC INTERVAL: 402 MS
T WAVE AXIS: 18 DEGREES
VENTRICULAR RATE: 69 BPM

## 2023-02-15 NOTE — PROGRESS NOTES
Name: Sarahi Suarez      : 1970      MRN: [id number]  Encounter Provider: JHONATAN Bolton  Encounter Date: 2023   Encounter department: [facility]     1  Intentional self-harm by unspecified sharp object, sequela [facility])  Assessment & Plan: Today left forearm is exposed   · Residual scarring to the left forearm noted, no open areas   Discussed life stressors   · Emotional support provided       2  Borderline personality disorder [facility])  Assessment & Plan:  Medications currently managed by psychiatry       3  Anxiety  Assessment & Plan:  Medications managed by psychiatry      4  Chronic obstructive pulmonary disease, unspecified COPD type [facility])  Assessment & Plan:  Presented to ED on 2/10 for chest pain   Cardiac and pulmonary workup (-)   Still taking fluticasone and albuterol            Subjective      ED on 02/10 Under a lot of stress lately and feeling more depressed than baseline  Psychiatrist  has been titrating her medication and making adjustments which is affecting how she is feeling, she also notes that it was the anniversary of her mother's death this past week which has made matters worse  Pt reports that she stopped taking her medication because of her poor mood and has made superficial cuts along the volar surface of her left arm  She has a history of this behavior when she is feeling anxious and stressed  She adamantly denies suicidal thoughts, gesture  PT also reports nausea, vomiting and diarrhea over last several days as well as chest tightness and SOB  EKG: Normal sinus rhythm   Labs/chest x-ray (-)      [facility] team was supposed to come out yesterday (2/15) but no one came  They come to the home on   She goes into the office on Monday, in which they saw her  Restarted medication for mental health- psychiatry changed medications to what they originally were       Still under going high life stressors at the moment which include anniversary of mothers, job strain, and eviction of housing  Review of Systems   Constitutional: Negative for chills and fever  HENT: Negative for ear pain and sore throat  Eyes: Negative for pain and visual disturbance  Respiratory: Negative for cough and shortness of breath  Cardiovascular: Negative for chest pain and palpitations  Gastrointestinal: Negative for abdominal pain and vomiting  Genitourinary: Negative for dysuria and hematuria  Musculoskeletal: Negative for arthralgias and back pain  Skin: Negative for color change and rash  Neurological: Negative for seizures and syncope  All other systems reviewed and are negative  Current Outpatient Medications on File Prior to Visit   Medication Sig   • albuterol (Ventolin HFA) 90 mcg/act inhaler Inhale 2 puffs every 6 (six) hours as needed for wheezing   • amLODIPine (NORVASC) 5 mg tablet TAKE 1 TABLET (5 MG TOTAL) BY MOUTH DAILY   • buPROPion (WELLBUTRIN XL) 300 mg 24 hr tablet Take 300 mg by mouth daily   • carBAMazepine (CARBATROL) 300 MG 12 hr capsule Take 1 capsule by mouth in the morning   • Cholecalciferol (Vitamin D3) 125 MCG (5000 UT) CAPS Take by mouth in the morning   • divalproex sodium (DEPAKOTE ER) 250 mg 24 hr tablet    • divalproex sodium (DEPAKOTE ER) 500 mg 24 hr tablet 500 mg 2 (two) times a day   • estradiol (ESTRACE) 0 5 MG tablet TAKE 1 TABLET (0 5 MG TOTAL) BY MOUTH DAILY   • Fesoterodine Fumarate ER (Toviaz) 4 MG TB24 Take 1 tablet by mouth daily   • fluticasone (FLOVENT HFA) 110 MCG/ACT inhaler Inhale 2 puffs as needed Rinse mouth after use     • furosemide (LASIX) 20 mg tablet TAKE 1 TABLET BY MOUTH DAILY   • haloperidol decanoate (Haldol Decanoate) 50 mg/mL injection every 30 (thirty) days   • hydrocortisone 2 5 % ointment Apply topically 2 (two) times a day   • levothyroxine 112 mcg tablet Take 1 tablet (112 mcg total) by mouth daily in the early morning   • losartan (COZAAR) 50 mg tablet TAKE ONE TABLET BY MOUTH DAILY   • Lurasidone HCl 60 MG TABS Take 1 tablet (60 mg total) by mouth 2 (two) times a day TAKEN IN THE MORNING AND AT NIGHT-----Latuda   • metoprolol tartrate (LOPRESSOR) 25 mg tablet TAKE ONE TABLET BY MOUTH EVERY 12 HOURS   • pilocarpine (SALAGEN) 5 mg tablet Take 5 mg by mouth 2 (two) times a day   • RABEprazole (ACIPHEX) 20 MG tablet Take 20 mg by mouth 2 (two) times a day   • rOPINIRole (REQUIP) 0 5 mg tablet Take by mouth daily at bedtime   • rOPINIRole (REQUIP) 1 mg tablet    • topiramate (TOPAMAX) 100 mg tablet Take 1 5 tablets (150 mg total) by mouth 2 (two) times a day   • topiramate (TOPAMAX) 200 MG tablet    • topiramate (TOPAMAX) 50 MG tablet Take 50 mg by mouth 2 (two) times a day   • traZODone (DESYREL) 50 mg tablet TAKE 1 TABLET BY MOUTH DAILY AT BEDTIME   • trospium chloride (SANCTURA) 20 mg tablet Take 20 mg by mouth 2 (two) times a day   • venlafaxine (EFFEXOR) 37 5 mg tablet Take 37 5 mg by mouth daily   • buPROPion (WELLBUTRIN XL) 150 mg 24 hr tablet Take 1 tablet (150 mg total) by mouth daily   • colestipol (COLESTID) 1 g tablet Take 1 tablet (1 g total) by mouth 2 (two) times a day   • divalproex sodium (DEPAKOTE) 250 mg EC tablet Take 3 tablets (750 mg total) by mouth every 12 (twelve) hours   • hydrOXYzine HCL (ATARAX) 25 mg tablet Take 1 tablet (25 mg total) by mouth every 6 (six) hours as needed for itching (mild anxiety)       Objective     /80 (BP Location: Left arm, Patient Position: Sitting, Cuff Size: Large)   Pulse 78   Temp (!) 97 2 °F (36 2 °C) (Temporal)   Wt 130 kg (287 lb 6 4 oz)   SpO2 99%   BMI 47 83 kg/m²     Physical Exam  Vitals and nursing note reviewed  Constitutional:       Appearance: Normal appearance  She is normal weight  HENT:      Head: Normocephalic  Right Ear: Tympanic membrane, ear canal and external ear normal  There is no impacted cerumen        Left Ear: Tympanic membrane, ear canal and external ear normal  There is no impacted cerumen  Nose: Nose normal       Mouth/Throat:      Mouth: Mucous membranes are moist       Pharynx: Oropharynx is clear  Eyes:      Extraocular Movements: Extraocular movements intact  Conjunctiva/sclera: Conjunctivae normal       Pupils: Pupils are equal, round, and reactive to light  Cardiovascular:      Rate and Rhythm: Normal rate and regular rhythm  Pulses: Normal pulses  Heart sounds: Normal heart sounds  Pulmonary:      Effort: Pulmonary effort is normal       Breath sounds: Normal breath sounds  Abdominal:      General: Bowel sounds are normal  There is no distension  Palpations: Abdomen is soft  Tenderness: There is no abdominal tenderness  Musculoskeletal:         General: Normal range of motion  Cervical back: Normal range of motion  Skin:     General: Skin is warm  Capillary Refill: Capillary refill takes less than 2 seconds  Neurological:      General: No focal deficit present  Mental Status: She is alert and oriented to person, place, and time  Mental status is at baseline  Psychiatric:         Mood and Affect: Mood normal          Behavior: Behavior normal          Thought Content:  Thought content normal          Judgment: Judgment normal        JHONATAN Pfeiffer

## 2023-02-16 ENCOUNTER — APPOINTMENT (OUTPATIENT)
Dept: PHYSICAL THERAPY | Age: 53
End: 2023-02-16

## 2023-02-16 ENCOUNTER — OFFICE VISIT (OUTPATIENT)
Dept: FAMILY MEDICINE CLINIC | Facility: CLINIC | Age: 53
End: 2023-02-16

## 2023-02-16 VITALS
HEART RATE: 78 BPM | TEMPERATURE: 97.2 F | WEIGHT: 287.4 LBS | BODY MASS INDEX: 47.83 KG/M2 | OXYGEN SATURATION: 99 % | DIASTOLIC BLOOD PRESSURE: 80 MMHG | SYSTOLIC BLOOD PRESSURE: 120 MMHG

## 2023-02-16 DIAGNOSIS — F60.3 BORDERLINE PERSONALITY DISORDER (HCC): ICD-10-CM

## 2023-02-16 DIAGNOSIS — F41.9 ANXIETY: ICD-10-CM

## 2023-02-16 DIAGNOSIS — J44.9 CHRONIC OBSTRUCTIVE PULMONARY DISEASE, UNSPECIFIED COPD TYPE (HCC): Chronic | ICD-10-CM
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
## 2023-02-16 NOTE — ASSESSMENT & PLAN NOTE
Presented to ED on 2/10 for chest pain   Cardiac and pulmonary workup (-)   Still taking fluticasone and albuterol

## 2023-02-16 NOTE — ASSESSMENT & PLAN NOTE
Today left forearm is exposed   · Residual scarring to the left forearm noted, no open areas   Discussed life stressors   · Emotional support provided

## 2023-02-23 ENCOUNTER — APPOINTMENT (OUTPATIENT)
Dept: PHYSICAL THERAPY | Age: 53
End: 2023-02-23

## 2023-02-23 ENCOUNTER — OFFICE VISIT (OUTPATIENT)
Dept: FAMILY MEDICINE CLINIC | Facility: CLINIC | Age: 53
End: 2023-02-23

## 2023-02-23 VITALS
HEART RATE: 88 BPM | HEIGHT: 65 IN | OXYGEN SATURATION: 96 % | BODY MASS INDEX: 48.38 KG/M2 | WEIGHT: 290.4 LBS | DIASTOLIC BLOOD PRESSURE: 88 MMHG | SYSTOLIC BLOOD PRESSURE: 148 MMHG | TEMPERATURE: 97.8 F

## 2023-02-23 DIAGNOSIS — H60.8X3 CHRONIC ECZEMATOUS OTITIS EXTERNA OF BOTH EARS: Primary | ICD-10-CM

## 2023-02-23 RX ORDER — MOMETASONE FUROATE 1 MG/ML
SOLUTION TOPICAL DAILY
Qty: 60 ML | Refills: 0 | Status: SHIPPED | OUTPATIENT
Start: 2023-02-23

## 2023-02-23 NOTE — ASSESSMENT & PLAN NOTE
Ear ache x1 day   Patient admits to scratching inner ear   · Ear canal is red and tender with signs of irritation   Mometasone lotion ordered   · Advised to limit scratching in inner ear to prevent further breakdown

## 2023-02-23 NOTE — LETTER
February 23, 2023     Patient: Nataliia Montejo  YOB: 1970  Date of Visit: 2/23/2023      To Whom it May Concern:    Cecile Lauren is under my professional care  Ramirez Billings was seen in my office on 2/23/2023  Ramirez Billings may return to work on 2/24/23  If you have any questions or concerns, please don't hesitate to call           Sincerely,             JHONATAN Mathews        CC: No Recipients

## 2023-02-23 NOTE — PROGRESS NOTES
Name: Xu Casillas      : 1970      MRN: [id number]  Encounter Provider: JHONATAN Palencia  Encounter Date: 2023   Encounter department: [address] [place] [address]  Chronic eczematous otitis externa of both ears  Assessment & Plan:  Ear ache x1 day   Patient admits to scratching inner ear   · Ear canal is red and tender with signs of irritation   Mometasone lotion ordered   · Advised to limit scratching in inner ear to prevent further breakdown     Orders:  -     mometasone (ELOCON) 0 1 % lotion; Apply topically daily          Subjective      Earache   There is pain in the left ear  This is a new problem  The current episode started yesterday  The problem occurs constantly  The problem has been rapidly worsening  There has been no fever  The pain is at a severity of 8/10  The pain is severe  Pertinent negatives include no abdominal pain, coughing, diarrhea, ear discharge, headaches, hearing loss, neck pain, rash, rhinorrhea, sore throat or vomiting  She has tried nothing for the symptoms  The treatment provided no relief  There is no history of a chronic ear infection  Review of Systems   Constitutional: Negative for activity change, chills, fatigue and fever  HENT: Positive for ear pain  Negative for congestion, dental problem, ear discharge, hearing loss, postnasal drip, rhinorrhea, sinus pressure, sinus pain, sore throat and trouble swallowing  Eyes: Negative for pain and visual disturbance  Respiratory: Negative for cough, chest tightness and shortness of breath  Cardiovascular: Negative for chest pain, palpitations and leg swelling  Gastrointestinal: Negative for abdominal pain, constipation, diarrhea, nausea and vomiting  Genitourinary: Negative for difficulty urinating, dysuria, hematuria and urgency  Musculoskeletal: Negative for arthralgias, back pain and neck pain  Skin: Negative for color change and rash     Neurological: Negative for dizziness, seizures, syncope and headaches  Psychiatric/Behavioral: Negative for dysphoric mood  The patient is not nervous/anxious  All other systems reviewed and are negative  Current Outpatient Medications on File Prior to Visit   Medication Sig   • albuterol (Ventolin HFA) 90 mcg/act inhaler Inhale 2 puffs every 6 (six) hours as needed for wheezing   • amLODIPine (NORVASC) 5 mg tablet TAKE 1 TABLET (5 MG TOTAL) BY MOUTH DAILY   • buPROPion (WELLBUTRIN XL) 300 mg 24 hr tablet Take 300 mg by mouth daily   • carBAMazepine (CARBATROL) 300 MG 12 hr capsule Take 1 capsule by mouth in the morning   • Cholecalciferol (Vitamin D3) 125 MCG (5000 UT) CAPS Take by mouth in the morning   • divalproex sodium (DEPAKOTE ER) 250 mg 24 hr tablet    • divalproex sodium (DEPAKOTE ER) 500 mg 24 hr tablet 500 mg 2 (two) times a day   • estradiol (ESTRACE) 0 5 MG tablet TAKE 1 TABLET (0 5 MG TOTAL) BY MOUTH DAILY   • Fesoterodine Fumarate ER (Toviaz) 4 MG TB24 Take 1 tablet by mouth daily   • fluticasone (FLOVENT HFA) 110 MCG/ACT inhaler Inhale 2 puffs as needed Rinse mouth after use     • furosemide (LASIX) 20 mg tablet TAKE 1 TABLET BY MOUTH DAILY   • haloperidol decanoate (Haldol Decanoate) 50 mg/mL injection every 30 (thirty) days   • hydrocortisone 2 5 % ointment Apply topically 2 (two) times a day   • levothyroxine 112 mcg tablet Take 1 tablet (112 mcg total) by mouth daily in the early morning   • losartan (COZAAR) 50 mg tablet TAKE ONE TABLET BY MOUTH DAILY   • Lurasidone HCl 60 MG TABS Take 1 tablet (60 mg total) by mouth 2 (two) times a day TAKEN IN THE MORNING AND AT NIGHT-----Latuda   • metoprolol tartrate (LOPRESSOR) 25 mg tablet TAKE ONE TABLET BY MOUTH EVERY 12 HOURS   • pilocarpine (SALAGEN) 5 mg tablet Take 5 mg by mouth 2 (two) times a day   • RABEprazole (ACIPHEX) 20 MG tablet Take 20 mg by mouth 2 (two) times a day   • rOPINIRole (REQUIP) 0 5 mg tablet Take by mouth daily at bedtime   • rOPINIRole (REQUIP) 1 mg tablet    • topiramate (TOPAMAX) 100 mg tablet Take 1 5 tablets (150 mg total) by mouth 2 (two) times a day   • topiramate (TOPAMAX) 200 MG tablet    • topiramate (TOPAMAX) 50 MG tablet Take 50 mg by mouth 2 (two) times a day   • trospium chloride (SANCTURA) 20 mg tablet Take 20 mg by mouth 2 (two) times a day   • venlafaxine (EFFEXOR) 37 5 mg tablet Take 37 5 mg by mouth daily   • [DISCONTINUED] traZODone (DESYREL) 50 mg tablet TAKE 1 TABLET BY MOUTH DAILY AT BEDTIME   • buPROPion (WELLBUTRIN XL) 150 mg 24 hr tablet Take 1 tablet (150 mg total) by mouth daily   • colestipol (COLESTID) 1 g tablet Take 1 tablet (1 g total) by mouth 2 (two) times a day   • divalproex sodium (DEPAKOTE) 250 mg EC tablet Take 3 tablets (750 mg total) by mouth every 12 (twelve) hours   • hydrOXYzine HCL (ATARAX) 25 mg tablet Take 1 tablet (25 mg total) by mouth every 6 (six) hours as needed for itching (mild anxiety)       Objective     /88 (BP Location: Left arm, Patient Position: Sitting, Cuff Size: Large)   Pulse 88   Temp 97 8 °F (36 6 °C) (Temporal)   Ht 5' 5" (1 651 m)   Wt 132 kg (290 lb 6 4 oz)   SpO2 96%   BMI 48 33 kg/m²     Physical Exam  Vitals and nursing note reviewed  Constitutional:       Appearance: Normal appearance  She is normal weight  HENT:      Head: Normocephalic  Right Ear: Tympanic membrane, ear canal and external ear normal  Laceration and tenderness present  No drainage or swelling  There is no impacted cerumen  Tympanic membrane is not injected, scarred, perforated, erythematous, retracted or bulging  Tympanic membrane has normal mobility  Left Ear: Tympanic membrane, ear canal and external ear normal  Laceration and tenderness present  No drainage or swelling  There is no impacted cerumen  Tympanic membrane is not injected, scarred, perforated, erythematous, retracted or bulging  Tympanic membrane has normal mobility        Nose: Nose normal  Mouth/Throat:      Mouth: Mucous membranes are moist       Pharynx: Oropharynx is clear  Eyes:      Extraocular Movements: Extraocular movements intact  Conjunctiva/sclera: Conjunctivae normal       Pupils: Pupils are equal, round, and reactive to light  Cardiovascular:      Rate and Rhythm: Normal rate and regular rhythm  Pulses: Normal pulses  Heart sounds: Normal heart sounds  Pulmonary:      Effort: Pulmonary effort is normal       Breath sounds: Normal breath sounds  Abdominal:      General: Bowel sounds are normal  There is no distension  Palpations: Abdomen is soft  Tenderness: There is no abdominal tenderness  Musculoskeletal:         General: Normal range of motion  Cervical back: Normal range of motion  Skin:     General: Skin is warm  Capillary Refill: Capillary refill takes less than 2 seconds  Neurological:      General: No focal deficit present  Mental Status: She is alert and oriented to person, place, and time  Mental status is at baseline  Psychiatric:         Mood and Affect: Mood normal          Behavior: Behavior normal          Thought Content:  Thought content normal          Judgment: Judgment normal        Oneil JHONATAN Tee

## 2023-02-27 DIAGNOSIS — I10 HYPERTENSION, UNSPECIFIED TYPE: ICD-10-CM

## 2023-02-28 RX ORDER — FUROSEMIDE 20 MG/1
TABLET ORAL
Qty: 30 TABLET | Refills: 1 | Status: SHIPPED | OUTPATIENT
Start: 2023-02-28

## 2023-03-02 ENCOUNTER — APPOINTMENT (OUTPATIENT)
Dept: PHYSICAL THERAPY | Age: 53
End: 2023-03-02

## 2023-03-06 PROBLEM — G47.00 INSOMNIA: Status: RESOLVED | Noted: 2023-01-12 | Resolved: 2023-03-06

## 2023-03-08 ENCOUNTER — OFFICE VISIT (OUTPATIENT)
Dept: PHYSICAL THERAPY | Age: 53
End: 2023-03-08

## 2023-03-08 ENCOUNTER — TELEPHONE (OUTPATIENT)
Dept: FAMILY MEDICINE CLINIC | Facility: CLINIC | Age: 53
End: 2023-03-08

## 2023-03-08 DIAGNOSIS — M25.551 RIGHT HIP PAIN: Primary | ICD-10-CM

## 2023-03-08 NOTE — PROGRESS NOTES
Daily Note     Today's date: 3/8/2023  Patient name: Princess Medina  : 1970  MRN: [id number]  Referring provider: JHONATAN Fairbanks  Dx:   Encounter Diagnosis     ICD-10-CM    1  Right hip pain  M25 [id number]                      Subjective: Patient reports that she has been busy with doctor appts and work duties  Patient reports that she has been trying to stretch at home when she can; hasn't been able to get into a stretching routine  Patient feeling sore at tired in her shoulders and upper back, from work yesterday  Objective: See treatment diary below      Assessment: Tolerated treatment well  Patient demonstrated fatigue post treatment, exhibited good technique with therapeutic exercises and would benefit from continued PT Initiated treatment today  Patient tolerating exercises well, no increase in hip pain with TE  Need to be careful of knees as patient plans to have a knee replacement next year as her arthritis has been worsening  Plan: Continue per plan of care        Precautions: psych history  Supine with wedge    Manuals - IE 3/8                                      Neuro Re-Ed                sidestepping        Step ups FW, lateral        squats                                Ther Ex                Nu step  x10'              IT band stretch  3x:20      Ham stretch  3x:20      Standing hip flexor stretch                bridges  2x10      SAQ  3# 2x10      SLR flex  10x      LAQ  2x10              SLR hip abd, stand  2x10      Ham curls  2x10      HR  20x                              Ther Activity                        Gait Training                        Modalities        MH vs CP PRN                Progress as able

## 2023-03-09 ENCOUNTER — APPOINTMENT (OUTPATIENT)
Dept: PHYSICAL THERAPY | Age: 53
End: 2023-03-09

## 2023-03-09 ENCOUNTER — APPOINTMENT (OUTPATIENT)
Dept: LAB | Facility: CLINIC | Age: 53
End: 2023-03-09

## 2023-03-09 ENCOUNTER — OFFICE VISIT (OUTPATIENT)
Dept: SLEEP CENTER | Facility: CLINIC | Age: 53
End: 2023-03-09

## 2023-03-09 ENCOUNTER — OFFICE VISIT (OUTPATIENT)
Dept: FAMILY MEDICINE CLINIC | Facility: CLINIC | Age: 53
End: 2023-03-09

## 2023-03-09 VITALS
SYSTOLIC BLOOD PRESSURE: 144 MMHG | DIASTOLIC BLOOD PRESSURE: 88 MMHG | OXYGEN SATURATION: 99 % | HEART RATE: 74 BPM | BODY MASS INDEX: 48.18 KG/M2 | TEMPERATURE: 97.6 F | HEIGHT: 65 IN | WEIGHT: 289.2 LBS

## 2023-03-09 VITALS
HEIGHT: 65 IN | DIASTOLIC BLOOD PRESSURE: 80 MMHG | BODY MASS INDEX: 47.98 KG/M2 | SYSTOLIC BLOOD PRESSURE: 138 MMHG | WEIGHT: 288 LBS

## 2023-03-09 DIAGNOSIS — E03.9 MYXEDEMA HEART DISEASE: ICD-10-CM

## 2023-03-09 DIAGNOSIS — F33.2 SEVERE EPISODE OF RECURRENT MAJOR DEPRESSIVE DISORDER, WITHOUT PSYCHOTIC FEATURES (HCC): Chronic | ICD-10-CM

## 2023-03-09 DIAGNOSIS — Z79.899 ENCOUNTER FOR LONG-TERM (CURRENT) USE OF OTHER MEDICATIONS: ICD-10-CM

## 2023-03-09 DIAGNOSIS — G47.00 INSOMNIA WITH SLEEP APNEA: ICD-10-CM

## 2023-03-09 DIAGNOSIS — M35.00 SICCA SYNDROME (HCC): ICD-10-CM

## 2023-03-09 DIAGNOSIS — E66.01 MORBID OBESITY WITH BMI OF 45.0-49.9, ADULT (HCC): ICD-10-CM

## 2023-03-09 DIAGNOSIS — F25.0 SCHIZOAFFECTIVE DISORDER, BIPOLAR TYPE (HCC): ICD-10-CM

## 2023-03-09 DIAGNOSIS — J45.909 ASTHMATIC BRONCHITIS WITHOUT COMPLICATION, UNSPECIFIED ASTHMA SEVERITY, UNSPECIFIED WHETHER PERSISTENT: ICD-10-CM

## 2023-03-09 DIAGNOSIS — E78.2 MIXED HYPERLIPIDEMIA: ICD-10-CM

## 2023-03-09 DIAGNOSIS — K21.9 GASTROESOPHAGEAL REFLUX DISEASE WITHOUT ESOPHAGITIS: ICD-10-CM

## 2023-03-09 DIAGNOSIS — F60.3 EXPLOSIVE PERSONALITY DISORDER (HCC): ICD-10-CM

## 2023-03-09 DIAGNOSIS — G47.30 INSOMNIA WITH SLEEP APNEA: ICD-10-CM

## 2023-03-09 DIAGNOSIS — I51.9 MYXEDEMA HEART DISEASE: ICD-10-CM

## 2023-03-09 DIAGNOSIS — F45.8 ANXIETY HYPERVENTILATION: ICD-10-CM

## 2023-03-09 DIAGNOSIS — I10 BENIGN ESSENTIAL HYPERTENSION: Primary | Chronic | ICD-10-CM

## 2023-03-09 DIAGNOSIS — R60.9 EDEMA, UNSPECIFIED TYPE: ICD-10-CM

## 2023-03-09 DIAGNOSIS — F31.81 BIPOLAR II DISORDER (HCC): ICD-10-CM

## 2023-03-09 DIAGNOSIS — G47.33 OSA (OBSTRUCTIVE SLEEP APNEA): Primary | Chronic | ICD-10-CM

## 2023-03-09 DIAGNOSIS — F41.9 ANXIETY HYPERVENTILATION: ICD-10-CM

## 2023-03-09 DIAGNOSIS — M54.2 NECK PAIN: ICD-10-CM

## 2023-03-09 DIAGNOSIS — F32.A DEPRESSIVE TYPE PSYCHOSIS: ICD-10-CM

## 2023-03-09 LAB
AMMONIA PLAS-SCNC: 32 UMOL/L (ref 11–35)
CHOLEST SERPL-MCNC: 234 MG/DL
HDLC SERPL-MCNC: 71 MG/DL
LDLC SERPL CALC-MCNC: 132 MG/DL (ref 0–100)
NONHDLC SERPL-MCNC: 163 MG/DL
TRIGL SERPL-MCNC: 153 MG/DL
VALPROATE SERPL-MCNC: 59 UG/ML (ref 50–100)

## 2023-03-09 RX ORDER — METHOCARBAMOL 500 MG/1
500 TABLET, FILM COATED ORAL 3 TIMES DAILY
Qty: 14 TABLET | Refills: 0 | Status: SHIPPED | OUTPATIENT
Start: 2023-03-09

## 2023-03-09 NOTE — PROGRESS NOTES
Progress Note - Sleep Medicine  Ting Molina 47 y o  female MRN: [id number]       Impression & Plan:     49 y  o F with PMHx of Sjogren's disease, HTN, GERD, Bipolar disease, Asthma, Yan's esophagus, COPD, who comes in for compliance evaluation while on CPAP    1   Severe MAG (AHI - 74 2) - on autoPAP 12-20   Compliance data shows improved use at 93% > 4 hours and average use of 6 hours and 51 minutes per night   She still notes excessive daytime sleepiness    Residual AHI - 0 8        - Patient still noting symptoms despite CPAP and pressures are running towards the upper limit with maxium at 19 3 cmH2O and median at 14 3  Reports panic attacks due to feelings of areophagia  - Will switch patient to BiPAP and reviewed rationale with patient to avoid discomfort at night  Set at 12-25 cmH2O with 4 cmH2O of support  -  Encouraged continues use every night for all of the night        -  I again reminded her of the risk of leaving sleep apnea untreated including hypertension, heart failure, arrhythmia, MI and stroke       2  Former smoker (25 pack years)- she carries a diagnosis of COPD      - PFT wasn't a reliable test due to suboptimal effort      - Noted hyperinflation and gas trapping      - Normal spirometry overall and no reversibility observed      - Suggestive more of COPD process rather than Asthma      - Educated on smoking marijuana also being contraindicated and recommended cessation vs alternative method of administration      3    Insomnia -       - Sleep maintenance greater than sleep onset      - Ramelteon was prescribed for patient at last visit but she is unsure about if she ever acquired this or trailed it      - Discussed and educated on negative impact of Methamphetamines on sleep and recommended continued efforts at cessation with therapist      - Will avoid addition of sleep aids at this time due to polypharmacy and lifestyle issues which would likely make her refractory to these interventions    4  Restless legs      - Subjectively improved per patient and states likely driven in part due to methamphetamine use      - 2/10/23: CBC WNL      - Continue Requip 1 5 mg PO QHS  Educated about possible impact to mood and impulsivity and encouraged discussions with outpatient psychiatrist     5  Bipolar affective disorder      - Could be fair contributor to poor sleep as well with exacerbation due to methamphetamine use      - Currently presents with pressured speech and increased rate of thought process      - Recommend continued follow up with outpatient psychiatrist and adjustments to mood stabilizers as instructed  She is on multiple mood stabilizers currently including Depakote, Tegretol, Latuda, and Topamax       - Educated about activating nature of Effexor and Wellbutrin   ______________________________________________________________________    HPI:    Ron Lezama presents today for follow-up of MAG on CPAP  Patient's compliance data is very good with 93% use and average usage of 6 hours and 51 minutes but she continues to experience excessive day time sleepiness  States she has no problems going to sleep but does have some issues with staying asleep  States that her restless legs are under better control on requip  States she is unaware if she is snoring or apneic at night anymore  AHI has reduced to 0 8  States she does feel more rested when she uses her CPAP correctly  Denies any troubles with breathing aside from when she has excess activity  Report she has discontinued cigarette use about 4-5 years ago and is now "just smoking medical marijuana " States does have dry mouth due to Sjogren's but denies headaches specifically on awakening  Does report feelings of discomfort and panic attacks 2/2 to feeling like "air is being forced "    Epiworth scale 11  Indicates she goes to sleep around 8-9 pm because she is so tired   States she wakes up at 5-5:30 am typically to get to work, go to appointments, or do her laundry  Indicates she wakes up many times during the day about 2 times or more, every few hours  States she had medical marijuana and has been "meddeling with meth" which her PCP is aware of  States she has a psychiatrist Dr Maxim Nash with [facility] who also aware of her use  States they are in the process of making medication changes to help with mood stabilization  Says she does a few lines in the morning, about 3-4 in the morning  Indicates "its a bad habit, its not like I really need it or want it " Reports she is seeing a drug/alcohol therapist who is working with her to quit but says "I'm getting to the point where I think I can quit " States she does try not to nap during but sometimes it becomes overwhelming and she succumbs to this  Review of Systems:  Review of Systems      Social history updates:  Social History     Tobacco Use   Smoking Status Former   • Packs/day: 2 00   • Years: 30 00   • Pack years: 60 00   • Types: Cigarettes   • Start date: 5   • Quit date: 2018   • Years since quittin 1   Smokeless Tobacco Never   Tobacco Comments    Started at age 14  Social History     Socioeconomic History   • Marital status: Single     Spouse name: Not on file   • Number of children: Not on file   • Years of education: Not on file   • Highest education level: Not on file   Occupational History   • Not on file   Tobacco Use   • Smoking status: Former     Packs/day: 2 00     Years: 30 00     Pack years: 60 00     Types: Cigarettes     Start date: 5     Quit date: 2018     Years since quittin 1   • Smokeless tobacco: Never   • Tobacco comments:     Started at age 14     Vaping Use   • Vaping Use: Some days   • Substances: THC   Substance and Sexual Activity   • Alcohol use: Not Currently     Comment: Recovering alcoholic   • Drug use: Not Currently     Types: Marijuana, Methamphetamines     Comment: Medical marijuana   • Sexual activity: Not Currently Partners: Male     Comment: defer   Other Topics Concern   • Not on file   Social History Narrative   • Not on file     Social Determinants of Health     Financial Resource Strain: High Risk   • Difficulty of Paying Living Expenses: Hard   Food Insecurity: Not on file   Transportation Needs: No Transportation Needs   • Lack of Transportation (Medical): No   • Lack of Transportation (Non-Medical): No   Physical Activity: Not on file   Stress: Not on file   Social Connections: Not on file   Intimate Partner Violence: Not on file   Housing Stability: Not on file       PhysicalExamination:  Vitals: There were no vitals taken for this visit  Diagnostic Data:  Labs:   I personally reviewed the most recent laboratory data pertinent to today's visit  Appointment on 03/09/2023   Component Date Value   • Cholesterol 03/09/2023 234 (H)    • Triglycerides 03/09/2023 153 (H)    • HDL, Direct 03/09/2023 71    • LDL Calculated 03/09/2023 132 (H)    • Non-HDL-Chol (CHOL-HDL) 03/09/2023 163    • Valproic Acid, Total 03/09/2023 59    • Ammonia 03/09/2023 32    Admission on 02/10/2023, Discharged on 02/11/2023   Component Date Value   • Ventricular Rate 02/10/2023 69    • Atrial Rate 02/10/2023 69    • KS Interval 02/10/2023 174    • QRSD Interval 02/10/2023 88    • QT Interval 02/10/2023 376    • QTC Interval 02/10/2023 402    • P Axis 02/10/2023 46    • QRS Axis 02/10/2023 -21    • T Wave Axis 02/10/2023 18    • WBC 02/10/2023 8 87    • RBC 02/10/2023 4 35    • Hemoglobin 02/10/2023 13 5    • Hematocrit 02/10/2023 41 3    • MCV 02/10/2023 95    • MCH 02/10/2023 31 0    • MCHC 02/10/2023 32 7    • RDW 02/10/2023 12 9    • MPV 02/10/2023 10 4    • Platelets 18/67/7881 172    • nRBC 02/10/2023 0    • Neutrophils Relative 02/10/2023 62    • Immat GRANS % 02/10/2023 0    • Lymphocytes Relative 02/10/2023 27    • Monocytes Relative 02/10/2023 10    • Eosinophils Relative 02/10/2023 1    • Basophils Relative 02/10/2023 0    • Neutrophils Absolute 02/10/2023 5 51    • Immature Grans Absolute 02/10/2023 0 03    • Lymphocytes Absolute 02/10/2023 2 35    • Monocytes Absolute 02/10/2023 0 86    • Eosinophils Absolute 02/10/2023 0 09    • Basophils Absolute 02/10/2023 0 03    • Sodium 02/10/2023 143    • Potassium 02/10/2023 3 6    • Chloride 02/10/2023 109 (H)    • CO2 02/10/2023 26    • ANION GAP 02/10/2023 8    • BUN 02/10/2023 12    • Creatinine 02/10/2023 0 49 (L)    • Glucose 02/10/2023 105    • Calcium 02/10/2023 9 0    • AST 02/10/2023 29    • ALT 02/10/2023 37    • Alkaline Phosphatase 02/10/2023 65    • Total Protein 02/10/2023 6 7    • Albumin 02/10/2023 3 8    • Total Bilirubin 02/10/2023 0 28    • eGFR 02/10/2023 113    • hs TnI 0hr 02/10/2023 4    Office Visit on 02/09/2023   Component Date Value   • LEUKOCYTE ESTERASE,UA 02/09/2023 neg    • NITRITE,UA 02/09/2023 neg    • SL AMB POCT UROBILINOGEN 02/09/2023 0 2    • POCT URINE PROTEIN 02/09/2023 300    •  PH,UA 02/09/2023 6 0    • BLOOD,UA 02/09/2023 neg    • SPECIFIC GRAVITY,UA 02/09/2023 1 030    • KETONES,UA 02/09/2023 5 0    • BILIRUBIN,UA 02/09/2023 neg    • GLUCOSE, UA 02/09/2023 neg    •  COLOR,UA 02/09/2023 yellow    • CLARITY,UA 02/09/2023 clear    Appointment on 01/06/2023   Component Date Value   • Ammonia 01/06/2023 50    Appointment on 01/05/2023   Component Date Value   • WBC 01/05/2023 9 33    • RBC 01/05/2023 4 21    • Hemoglobin 01/05/2023 14 0    • Hematocrit 01/05/2023 41 4    • MCV 01/05/2023 98    • MCH 01/05/2023 33 3    • MCHC 01/05/2023 33 8    • RDW 01/05/2023 13 5    • MPV 01/05/2023 12 1    • Platelets 83/26/4127 69 (L)    • nRBC 01/05/2023 0    • Neutrophils Relative 01/05/2023 65    • Immat GRANS % 01/05/2023 1    • Lymphocytes Relative 01/05/2023 23    • Monocytes Relative 01/05/2023 10    • Eosinophils Relative 01/05/2023 1    • Basophils Relative 01/05/2023 0    • Neutrophils Absolute 01/05/2023 6  11    • Immature Grans Absolute 01/05/2023 0 08 • Lymphocytes Absolute 01/05/2023 2 11    • Monocytes Absolute 01/05/2023 0 93    • Eosinophils Absolute 01/05/2023 0 07    • Basophils Absolute 01/05/2023 0 03    • Sodium 01/05/2023 140    • Potassium 01/05/2023 4 3    • Chloride 01/05/2023 110 (H)    • CO2 01/05/2023 26    • ANION GAP 01/05/2023 4    • BUN 01/05/2023 13    • Creatinine 01/05/2023 0 56 (L)    • Glucose, Fasting 01/05/2023 88    • Calcium 01/05/2023 9 0    • Corrected Calcium 01/05/2023 9 6    • AST 01/05/2023 14    • ALT 01/05/2023 29    • Alkaline Phosphatase 01/05/2023 77    • Total Protein 01/05/2023 7 0    • Albumin 01/05/2023 3 2 (L)    • Total Bilirubin 01/05/2023 0 24    • eGFR 01/05/2023 108    • Free T4 01/05/2023 0 87    • Hemoglobin A1C 01/05/2023 5 0    • EAG 01/05/2023 97    • Cholesterol 01/05/2023 235 (H)    • Triglycerides 01/05/2023 227 (H)    • HDL, Direct 01/05/2023 63    • LDL Calculated 01/05/2023 127 (H)    • Non-HDL-Chol (CHOL-HDL) 01/05/2023 172    • TSH 3RD GENERATON 01/05/2023 10 600 (H)    • Valproic Acid, Total 01/05/2023 25 (L)    Annual Exam on 12/28/2022   Component Date Value   • Case Report 12/28/2022                      Value:Gynecologic Cytology Report                       Case: [id number]                                  Authorizing Provider:  Katina Elizabeth DO       Collected:           12/28/2022 1147              Ordering Location:     [facility]      Received:            12/28/2022 1147              First Screen:          KIERAN Meeks                                                       Specimen:    LIQUID-BASED PAP, SCREENING, Cervix                                                       • Primary Interpretation 12/28/2022 Negative for intraepithelial lesion or malignancy    • Specimen Adequacy 12/28/2022 Satisfactory for evaluation  Endocervical/transformation zone component present  • Additional Information 12/28/2022                      Value: This result contains rich text formatting which cannot be displayed here  • LMP 12/28/2022     Office Visit on 12/28/2022   Component Date Value   • LEUKOCYTE ESTERASE,UA 12/28/2022 negative    • NITRITE,UA 12/28/2022 negative    • SL AMB POCT UROBILINOGEN 12/28/2022 negative    • POCT URINE PROTEIN 12/28/2022 small    •  PH,UA 12/28/2022 7 5    • BLOOD,UA 12/28/2022 negative    • SPECIFIC GRAVITY,UA 12/28/2022 1 000    • KETONES,UA 12/28/2022 negative    • BILIRUBIN,UA 12/28/2022 negative    • GLUCOSE, UA 12/28/2022 negative    •  COLOR,UA 12/28/2022 yellow    • CLARITY,UA 12/28/2022 clear    Appointment on 11/11/2022   Component Date Value   • Varicella IgG 11/11/2022 IMMUNE        CBC: No results found for: WBC, HGB, HCT, MCV, PLT, ADJUSTEDWBC, MCH, MCHC, RDW, MPV, NRBC, CMP: No results found for: SODIUM, K, CL, CO2, ANIONGAP, BUN, CREATININE, GLUCOSE, CALCIUM, AST, ALT, ALKPHOS, PROT, BILITOT, EGFR  Lab Results   Component Value Date    WBC 8 87 02/10/2023    HGB 13 5 02/10/2023    HCT 41 3 02/10/2023    MCV 95 02/10/2023     02/10/2023     Lab Results   Component Value Date    CALCIUM 9 0 02/10/2023    K 3 6 02/10/2023    CO2 26 02/10/2023     (H) 02/10/2023    BUN 12 02/10/2023    CREATININE 0 49 (L) 02/10/2023     No results found for: IGE  Lab Results   Component Value Date    ALT 37 02/10/2023    AST 29 02/10/2023    ALKPHOS 65 02/10/2023     No results found for: IRON, TIBC, FERRITIN  Lab Results   Component Value Date    [id number] 708 07/17/2022     Lab Results   Component Value Date    FOLATE 14 7 07/17/2022       Sleep studies:  Diagnostic: AHI 74 2  Titration: AHI 10 5 and 16 PLMs with PLM index of 2 6   CPAP auto set at 12-20 cmH2O  Split:    Compliance Data:  Type of CPAP:  Res Med                                   Percent usage: 93%                                   Average time used: 6 hours and 51 minutes                                  Time in large leak: 11 8                                   Residual AHI: 0 8                                         DO Kwesi Felder Mars's Psychiatric Resident

## 2023-03-09 NOTE — PROGRESS NOTES
Subjective:   Chief Complaint   Patient presents with   • Follow-up     6 months follow up , requesting tsh bloodwork    • Neck Pain     Hurt to turn head side to side    • Headache        Patient ID: Lucina Barlow is a 47 y o  female  Patient presents today for follow-up on chronic conditions and neck pain  Hypertension is managed on amlodipine 5mg, losartan 50mg, and metoprolol 25mg  Patient states that she takes her blood pressures at home but is unable to provide how the blood pressures have been ranging  Blood pressure at current visit is 144/88  Her hyperlipidemia is currently managed through diet modifications  Last lipid panel in January 2023, cholesterol=235, triglycerides=227, and HND=636  Patient meets with a psychiatrist once a month and a therapist once a week for the management of her schizoaffective and major depressive disorders  Today, patients states that she feels like she has a knot on the left side of her neck and that it is tensing up  Her next appointment for Botox is in one month, she is no longer seeing a chiropractor, and is not currently taking anything for pain relief  The need for physical therapy was discussed at this time  The following portions of the patient's history were reviewed and updated as appropriate: allergies, current medications, past family history, past medical history, past social history, past surgical history and problem list     Review of Systems   Constitutional: Negative for chills, fatigue and fever  Respiratory: Negative for chest tightness and shortness of breath  Cardiovascular: Negative for chest pain and palpitations  Musculoskeletal: Positive for neck pain  Psychiatric/Behavioral: Negative for dysphoric mood  Hallucinations: treated  The patient is not nervous/anxious (treated)                Objective:  Vitals:    03/09/23 0735   BP: 144/88   BP Location: Left arm   Patient Position: Sitting   Cuff Size: Adult   Pulse: 74   Temp: 97 6 °F (36 4 °C)   TempSrc: Temporal   SpO2: 99%   Weight: 131 kg (289 lb 3 2 oz)   Height: 5' 5" (1 651 m)      Physical Exam  Vitals reviewed  Constitutional:       Appearance: Normal appearance  She is obese  Cardiovascular:      Rate and Rhythm: Normal rate and regular rhythm  Pulses: Normal pulses  Heart sounds: Normal heart sounds  Pulmonary:      Effort: Pulmonary effort is normal       Breath sounds: Normal breath sounds  Musculoskeletal:      Cervical back: Normal range of motion  No tenderness  Skin:     General: Skin is warm and dry  Capillary Refill: Capillary refill takes less than 2 seconds  Neurological:      General: No focal deficit present  Mental Status: She is alert and oriented to person, place, and time  Psychiatric:         Mood and Affect: Mood normal          Behavior: Behavior normal            Assessment/Plan:    No problem-specific Assessment & Plan notes found for this encounter  Diagnoses and all orders for this visit:    Benign essential hypertension  Comments:  treated    Mixed hyperlipidemia  Comments:  diet and exercise reviewed    Schizoaffective disorder, bipolar type ([facility] )  Comments:  followed by psychiatry and psychology    Severe episode of recurrent major depressive disorder, without psychotic features ([facility] )  Comments:  followed by psychiatry and psychology    Morbid obesity with BMI of 45 0-49 9, adult ([facility] )  Comments:  diet and exercise reviewed    Neck pain  -     Ambulatory referral to Physical Therapy; Future  -     methocarbamol (ROBAXIN) 500 mg tablet;  Take 1 tablet (500 mg total) by mouth 3 (three) times a day

## 2023-03-09 NOTE — LETTER
March 9, 2023     Marc Lazo, [address]    Patient: Gage Tuttle   YOB: 1970   Date of Visit: 3/9/2023       Dear Dr Lauryn Glass: Thank you for referring Cherylin Sever to me for evaluation  Below are my notes for this consultation  If you have questions, please do not hesitate to call me  I look forward to following your patient along with you  Sincerely,        Lu Franco DO        CC: No Recipients  Ingrid Dunne DO  3/9/2023 11:24 AM  Attested  Progress Note - Sleep Medicine  Gage Tuttle 47 y o  female MRN: [id number]       Impression & Plan:     49 y  o F with PMHx of Sjogren's disease, HTN, GERD, Bipolar disease, Asthma, Yan's esophagus, COPD, who comes in for compliance evaluation while on CPAP    1   Severe MAG (AHI - 74 2) - on autoPAP 12-20   Compliance data shows improved use at 93% > 4 hours and average use of 6 hours and 51 minutes per night   She still notes excessive daytime sleepiness    Residual AHI - 0 8        - Patient still noting symptoms despite CPAP and pressures are running towards the upper limit with maxium at 19 3 cmH2O and median at 14 3  Reports panic attacks due to feelings of areophagia  - Will switch patient to BiPAP and reviewed rationale with patient to avoid discomfort at night  Set at 12-25 cmH2O with 4 cmH2O of support  -  Encouraged continues use every night for all of the night        -  I again reminded her of the risk of leaving sleep apnea untreated including hypertension, heart failure, arrhythmia, MI and stroke       2    Former smoker (25 pack years)- she carries a diagnosis of COPD      - PFT wasn't a reliable test due to suboptimal effort      - Noted hyperinflation and gas trapping      - Normal spirometry overall and no reversibility observed      - Suggestive more of COPD process rather than Asthma      - Educated on smoking marijuana also being contraindicated and recommended cessation vs alternative method of administration      3  Insomnia -       - Sleep maintenance greater than sleep onset      - Ramelteon was prescribed for patient at last visit but she is unsure about if she ever acquired this or trailed it      - Discussed and educated on negative impact of Methamphetamines on sleep and recommended continued efforts at cessation with therapist      - Will avoid addition of sleep aids at this time due to polypharmacy and lifestyle issues which would likely make her refractory to these interventions    4  Restless legs      - Subjectively improved per patient and states likely driven in part due to methamphetamine use      - 2/10/23: JOHN WNL      - Continue Requip 1 5 mg PO QHS  Educated about possible impact to mood and impulsivity and encouraged discussions with outpatient psychiatrist     5  Bipolar affective disorder      - Could be fair contributor to poor sleep as well with exacerbation due to methamphetamine use      - Currently presents with pressured speech and increased rate of thought process      - Recommend continued follow up with outpatient psychiatrist and adjustments to mood stabilizers as instructed  She is on multiple mood stabilizers currently including Depakote, Tegretol, Latuda, and Topamax       - Educated about activating nature of Effexor and Wellbutrin   ______________________________________________________________________    HPI:    Acemarisela Joshi presents today for follow-up of MAG on CPAP  Patient's compliance data is very good with 93% use and average usage of 6 hours and 51 minutes but she continues to experience excessive day time sleepiness  States she has no problems going to sleep but does have some issues with staying asleep  States that her restless legs are under better control on requip  States she is unaware if she is snoring or apneic at night anymore  AHI has reduced to 0 8   States she does feel more rested when she uses her CPAP correctly  Denies any troubles with breathing aside from when she has excess activity  Report she has discontinued cigarette use about 4-5 years ago and is now "just smoking medical marijuana " States does have dry mouth due to Sjogren's but denies headaches specifically on awakening  Does report feelings of discomfort and panic attacks 2/2 to feeling like "air is being forced "    Epiworth scale 11  Indicates she goes to sleep around 8-9 pm because she is so tired  States she wakes up at 5-5:30 am typically to get to work, go to appointments, or do her laundry  Indicates she wakes up many times during the day about 2 times or more, every few hours  States she had medical marijuana and has been "meddeling with meth" which her PCP is aware of  States she has a psychiatrist Dr Janie Mckeon with [facility] who also aware of her use  States they are in the process of making medication changes to help with mood stabilization  Says she does a few lines in the morning, about 3-4 in the morning  Indicates "its a bad habit, its not like I really need it or want it " Reports she is seeing a drug/alcohol therapist who is working with her to quit but says "I'm getting to the point where I think I can quit " States she does try not to nap during but sometimes it becomes overwhelming and she succumbs to this  Review of Systems:  Review of Systems      Social history updates:  Social History     Tobacco Use   Smoking Status Former   • Packs/day: 2 00   • Years: 30 00   • Pack years: 60 00   • Types: Cigarettes   • Start date: 5   • Quit date: 2018   • Years since quittin 1   Smokeless Tobacco Never   Tobacco Comments    Started at age 14       Social History     Socioeconomic History   • Marital status: Single     Spouse name: Not on file   • Number of children: Not on file   • Years of education: Not on file   • Highest education level: Not on file   Occupational History   • Not on file   Tobacco Use   • Smoking status: Former     Packs/day: 2 00     Years: 30 00     Pack years: 60 00     Types: Cigarettes     Start date: 5     Quit date: 2018     Years since quittin 1   • Smokeless tobacco: Never   • Tobacco comments:     Started at age 14  Vaping Use   • Vaping Use: Some days   • Substances: THC   Substance and Sexual Activity   • Alcohol use: Not Currently     Comment: Recovering alcoholic   • Drug use: Not Currently     Types: Marijuana, Methamphetamines     Comment: Medical marijuana   • Sexual activity: Not Currently     Partners: Male     Comment: defer   Other Topics Concern   • Not on file   Social History Narrative   • Not on file     Social Determinants of Health     Financial Resource Strain: High Risk   • Difficulty of Paying Living Expenses: Hard   Food Insecurity: Not on file   Transportation Needs: No Transportation Needs   • Lack of Transportation (Medical): No   • Lack of Transportation (Non-Medical): No   Physical Activity: Not on file   Stress: Not on file   Social Connections: Not on file   Intimate Partner Violence: Not on file   Housing Stability: Not on file       PhysicalExamination:  Vitals: There were no vitals taken for this visit  Diagnostic Data:  Labs:   I personally reviewed the most recent laboratory data pertinent to today's visit  Appointment on 2023   Component Date Value   • Cholesterol 2023 234 (H)    • Triglycerides 2023 153 (H)    • HDL, Direct 2023 71    • LDL Calculated 2023 132 (H)    • Non-HDL-Chol (CHOL-HDL) 2023 163    • Valproic Acid, Total 2023 59    • Ammonia 2023 32    Admission on 02/10/2023, Discharged on 2023   Component Date Value   • Ventricular Rate 02/10/2023 69    • Atrial Rate 02/10/2023 69    • AK Interval 02/10/2023 174    • QRSD Interval 02/10/2023 88    • QT Interval 02/10/2023 376    • QTC Interval 02/10/2023 402    • P Axis 02/10/2023 46    • QRS Axis 02/10/2023 -21    • T Wave Axis 02/10/2023 18    • WBC 02/10/2023 8 87    • RBC 02/10/2023 4 35    • Hemoglobin 02/10/2023 13 5    • Hematocrit 02/10/2023 41 3    • MCV 02/10/2023 95    • MCH 02/10/2023 31 0    • MCHC 02/10/2023 32 7    • RDW 02/10/2023 12 9    • MPV 02/10/2023 10 4    • Platelets 01/04/9755 172    • nRBC 02/10/2023 0    • Neutrophils Relative 02/10/2023 62    • Immat GRANS % 02/10/2023 0    • Lymphocytes Relative 02/10/2023 27    • Monocytes Relative 02/10/2023 10    • Eosinophils Relative 02/10/2023 1    • Basophils Relative 02/10/2023 0    • Neutrophils Absolute 02/10/2023 5 51    • Immature Grans Absolute 02/10/2023 0 03    • Lymphocytes Absolute 02/10/2023 2 35    • Monocytes Absolute 02/10/2023 0 86    • Eosinophils Absolute 02/10/2023 0 09    • Basophils Absolute 02/10/2023 0 03    • Sodium 02/10/2023 143    • Potassium 02/10/2023 3 6    • Chloride 02/10/2023 109 (H)    • CO2 02/10/2023 26    • ANION GAP 02/10/2023 8    • BUN 02/10/2023 12    • Creatinine 02/10/2023 0 49 (L)    • Glucose 02/10/2023 105    • Calcium 02/10/2023 9 0    • AST 02/10/2023 29    • ALT 02/10/2023 37    • Alkaline Phosphatase 02/10/2023 65    • Total Protein 02/10/2023 6 7    • Albumin 02/10/2023 3 8    • Total Bilirubin 02/10/2023 0 28    • eGFR 02/10/2023 113    • hs TnI 0hr 02/10/2023 4    Office Visit on 02/09/2023   Component Date Value   • LEUKOCYTE ESTERASE,UA 02/09/2023 neg    • NITRITE,UA 02/09/2023 neg    • SL AMB POCT UROBILINOGEN 02/09/2023 0 2    • POCT URINE PROTEIN 02/09/2023 300    •  PH,UA 02/09/2023 6 0    • BLOOD,UA 02/09/2023 neg    • SPECIFIC GRAVITY,UA 02/09/2023 1 030    • KETONES,UA 02/09/2023 5 0    • BILIRUBIN,UA 02/09/2023 neg    • GLUCOSE, UA 02/09/2023 neg    •  COLOR,UA 02/09/2023 yellow    • CLARITY,UA 02/09/2023 clear    Appointment on 01/06/2023   Component Date Value   • Ammonia 01/06/2023 50    Appointment on 01/05/2023   Component Date Value   • WBC 01/05/2023 9 33    • RBC 01/05/2023 4 21    • Hemoglobin 01/05/2023 14 0    • Hematocrit 01/05/2023 41 4    • MCV 01/05/2023 98    • MCH 01/05/2023 33 3    • MCHC 01/05/2023 33 8    • RDW 01/05/2023 13 5    • MPV 01/05/2023 12 1    • Platelets 67/51/7363 69 (L)    • nRBC 01/05/2023 0    • Neutrophils Relative 01/05/2023 65    • Immat GRANS % 01/05/2023 1    • Lymphocytes Relative 01/05/2023 23    • Monocytes Relative 01/05/2023 10    • Eosinophils Relative 01/05/2023 1    • Basophils Relative 01/05/2023 0    • Neutrophils Absolute 01/05/2023 6  11    • Immature Grans Absolute 01/05/2023 0 08    • Lymphocytes Absolute 01/05/2023 2 11    • Monocytes Absolute 01/05/2023 0 93    • Eosinophils Absolute 01/05/2023 0 07    • Basophils Absolute 01/05/2023 0 03    • Sodium 01/05/2023 140    • Potassium 01/05/2023 4 3    • Chloride 01/05/2023 110 (H)    • CO2 01/05/2023 26    • ANION GAP 01/05/2023 4    • BUN 01/05/2023 13    • Creatinine 01/05/2023 0 56 (L)    • Glucose, Fasting 01/05/2023 88    • Calcium 01/05/2023 9 0    • Corrected Calcium 01/05/2023 9 6    • AST 01/05/2023 14    • ALT 01/05/2023 29    • Alkaline Phosphatase 01/05/2023 77    • Total Protein 01/05/2023 7 0    • Albumin 01/05/2023 3 2 (L)    • Total Bilirubin 01/05/2023 0 24    • eGFR 01/05/2023 108    • Free T4 01/05/2023 0 87    • Hemoglobin A1C 01/05/2023 5 0    • EAG 01/05/2023 97    • Cholesterol 01/05/2023 235 (H)    • Triglycerides 01/05/2023 227 (H)    • HDL, Direct 01/05/2023 63    • LDL Calculated 01/05/2023 127 (H)    • Non-HDL-Chol (CHOL-HDL) 01/05/2023 172    • TSH 3RD GENERATON 01/05/2023 10 600 (H)    • Valproic Acid, Total 01/05/2023 25 (L)    Annual Exam on 12/28/2022   Component Date Value   • Case Report 12/28/2022                      Value:Gynecologic Cytology Report                       Case: [id number]                                  Authorizing Provider:  Unruly Boone DO       Collected:           12/28/2022 1147              Ordering Location:     [address] [place] [address] Received:            12/28/2022 1147              First Screen:          Adele Serrano, CT                                                       Specimen:    LIQUID-BASED PAP, SCREENING, Cervix                                                       • Primary Interpretation 12/28/2022 Negative for intraepithelial lesion or malignancy    • Specimen Adequacy 12/28/2022 Satisfactory for evaluation  Endocervical/transformation zone component present  • Additional Information 12/28/2022                      Value: This result contains rich text formatting which cannot be displayed here     • LMP 12/28/2022     Office Visit on 12/28/2022   Component Date Value   • LEUKOCYTE ESTERASE,UA 12/28/2022 negative    • NITRITE,UA 12/28/2022 negative    • SL AMB POCT UROBILINOGEN 12/28/2022 negative    • POCT URINE PROTEIN 12/28/2022 small    •  PH,UA 12/28/2022 7 5    • BLOOD,UA 12/28/2022 negative    • SPECIFIC GRAVITY,UA 12/28/2022 1 000    • KETONES,UA 12/28/2022 negative    • BILIRUBIN,UA 12/28/2022 negative    • GLUCOSE, UA 12/28/2022 negative    •  COLOR,UA 12/28/2022 yellow    • CLARITY,UA 12/28/2022 clear    Appointment on 11/11/2022   Component Date Value   • Varicella IgG 11/11/2022 IMMUNE        CBC: No results found for: WBC, HGB, HCT, MCV, PLT, ADJUSTEDWBC, MCH, MCHC, RDW, MPV, NRBC, CMP: No results found for: SODIUM, K, CL, CO2, ANIONGAP, BUN, CREATININE, GLUCOSE, CALCIUM, AST, ALT, ALKPHOS, PROT, BILITOT, EGFR  Lab Results   Component Value Date    WBC 8 87 02/10/2023    HGB 13 5 02/10/2023    HCT 41 3 02/10/2023    MCV 95 02/10/2023     02/10/2023     Lab Results   Component Value Date    CALCIUM 9 0 02/10/2023    K 3 6 02/10/2023    CO2 26 02/10/2023     (H) 02/10/2023    BUN 12 02/10/2023    CREATININE 0 49 (L) 02/10/2023     No results found for: IGE  Lab Results   Component Value Date    ALT 37 02/10/2023    AST 29 02/10/2023    ALKPHOS 65 02/10/2023     No results found for: IRON, TIBC, FERRITIN  Lab Results   Component Value Date    XYOZVTNO97 376 07/17/2022     Lab Results   Component Value Date    FOLATE 14 7 07/17/2022       Sleep studies:  Diagnostic: AHI 74 2  Titration: AHI 10 5 and 16 PLMs with PLM index of 2 6  CPAP auto set at 12-20 cmH2O  Split:    Compliance Data:  Type of CPAP:  Res Med                                   Percent usage: 93%                                   Average time used: 6 hours and 51 minutes                                  Time in large leak: 11 8                                   Residual AHI: 0 8                                         DO Meme Rocha 74 Psychiatric Resident  Attestation signed by Ladonna Issa DO at 3/9/2023  3:13 PM:  I have personally seen and examined the patient on 3/9//23 at 10:40 am  I discussed the patient with the resident including, but not limited to, verifying findings; reviewing labs and x-rays; developing the plan of care  I have reviewed the note and assessment performed by the resident and agree with the resident’s documented findings and plan of care with the following additions/exceptions  Please see my following comments for details and adjustments  Assessment/Plan   47 y  o  F with PMHx of Sjogren's disease, HTN, GERD, Bipolar disease, Asthma, Yan's esophagus, COPD, who comes in for MAG follow up  1   Severe MAG (AHI - 74 2) - on autoPAP 12-20  Compliance data shows optimal usage (CHASE Brady)  however, she still does not feel refreshed and struggles to breath against the pressure  Residual AHI - 0 8      -  For more optimal treatment and tolerance, will switch to autoBIPAP instead  Order placed  -  I again reminded her of the risk of leaving sleep apnea untreated including hypertension, heart failure, arrhythmia, MI and stroke  -  She may have a component of nocturnal hypoxia which may be causing her nocturnal interruptions  Will evaluate for that as well      2    Excessive daytime sleepiness - this likely occurs due to poor sleep quality, erratic schedule and methamphetamine use  -  She is working with her physicians to wean of amphetamines       -  She also should review her medication list and minimize her medications as possible as this is also likely contributing     3  Bipolar disease and insomnia - she is only several medications that are stimulating  I am concerned for possible hypomania and reduction of some of these medications would be beneficial    Former smoker- she carries a diagnosis of COPD but there are no PFTs noted  Check PFT to assess for obstructive sleep apnea  4   Former smoker/COPD - hyperinflation and gas trapping noted on PFT  -  Avoid smoking marijuana and amphetamine use       -  Hold on inhaler therapy at this time    Mrs Patsy Estrada returns to the office today to discuss her sleep and breathing  She states that she has been struggling staying awake during the day and to fall asleep and stay asleep at night  She is using her CPAP but struggles some nights with tolerance  She is not sure how much it is really helpings her daytime sleepiness  However, she has been abusing amphetamines as well as using marijuana at night  She follows with psychiatry and has been on multiple medications for that    She has been using her CPAP and does find it to be beneficial       /80   Ht 5' 5" (1 651 m)   Wt 131 kg (288 lb)   BMI 47 93 kg/m²   Physical Exam  General: Pleasant, Awake alert and oriented x 3, conversant without conversational dyspnea, NAD, normal affect  HEENT:  PERRL, Sclera noninjected, nonicteric OU, Nares patent,  no craniofacial abnormalities, Mucous membranes, moist, no oral lesions, normal dentition, Mallampati class 3  NECK: Trachea midline, no accessory muscle use, no stridor, no cervical or supraclavicular adenopathy, JVP not elevated  CARDIAC: Reg, single s1/S2, no m/r/g  PULM: CTA bilaterally no wheezing, rhonchi or rales  ABD: Normoactive bowel sounds, soft nontender, nondistended, no rebound, no rigidity, no guarding  EXT: No cyanosis, no clubbing, no edema, normal capillary refill  NEURO: no focal neurologic deficits, AAOx3, moving all extremities appropriately    Lab Results   Component Value Date    WBC 8 87 02/10/2023    HGB 13 5 02/10/2023    HCT 41 3 02/10/2023    MCV 95 02/10/2023     02/10/2023     Lab Results   Component Value Date    SODIUM 143 02/10/2023    K 3 6 02/10/2023     (H) 02/10/2023    CO2 26 02/10/2023    BUN 12 02/10/2023    CREATININE 0 49 (L) 02/10/2023    GLUC 105 02/10/2023    CALCIUM 9 0 02/10/2023     Lab Results   Component Value Date    ALT 37 02/10/2023    AST 29 02/10/2023    ALKPHOS 65 02/10/2023        Sleep studies:  Diagnostic: AHI 74 2  Titration: AHI 10 5 and 16 PLMs with PLM index of 2 6   CPAP auto set at 12-20 cmH2O  Split:     Compliance Data:  Type of CPAP:  Res Med                                   Percent usage: 93%                                   Average time used: 6 hours and 51 minutes                                  Time in large leak: 11 8                                   Residual AHI: 0 8

## 2023-03-09 NOTE — PATIENT INSTRUCTIONS
Heart Healthy Diet   WHAT YOU NEED TO KNOW:   A heart healthy diet is an eating plan low in unhealthy fats and sodium (salt)  The plan is high in healthy fats and fiber  A heart healthy diet helps improve your cholesterol levels and lowers your risk for heart disease and stroke  A dietitian will teach you how to read and understand food labels  DISCHARGE INSTRUCTIONS:   Heart healthy diet guidelines to follow:   Choose foods that contain healthy fats:      Unsaturated fats  include monounsaturated and polyunsaturated fats  Unsaturated fat is found in foods such as soybean, canola, olive, corn, and safflower oils  It is also found in soft tub margarine that is made with liquid vegetable oil  Omega-3 fat  is found in certain fish, such as salmon, tuna, and trout, and in walnuts and flaxseed  Eat fish high in omega-3 fats at least 2 times a week  Limit or do not have unhealthy fats:      Cholesterol  is found in animal foods, such as eggs and lobster, and in dairy products made from whole milk  Limit cholesterol to less than 200 mg each day  Saturated fat  is found in meats, such as gonzales and hamburger  It is also found in chicken or turkey skin, whole milk, and butter  Limit saturated fat to less than 7% of your total daily calories  Trans fat  is found in packaged foods, such as potato chips and cookies  It is also in hard margarine, some fried foods, and shortening  Do not eat foods that contain trans fats  Get 20 to 30 grams of fiber each day  Fruits, vegetables, whole-grain foods, and legumes (cooked beans) are good sources of fiber  Limit sodium as directed  You may be told to limit sodium, such as to 2,000 mg or less each day  Choose low-sodium or no-salt-added foods  Add little or no salt to food you prepare  Use herbs and spices in place of salt         Include the following in your heart healthy plan:  Ask your dietitian or healthcare provider how many servings to have each day from the following food groups:  Grains:      Whole-wheat breads, cereals, and pastas, and brown rice    Low-fat, low-sodium crackers and chips    Vegetables:      Broccoli, green beans, green peas, and spinach    Collards, kale, and lima beans    Carrots, sweet potatoes, tomatoes, and peppers    Canned vegetables with no salt added    Fruits:      Bananas, peaches, pears, and pineapple    Grapes, raisins, and dates    Oranges, tangerines, grapefruit, orange juice, and grapefruit juice    Apricots, mangoes, melons, and papaya    Raspberries and strawberries    Canned fruit with no added sugar    Low-fat dairy:      Nonfat (skim) milk, 1% milk, and low-fat almond, cashew, or soy milks fortified with calcium    Low-fat cheese, regular or frozen yogurt, and cottage cheese    Meats and proteins:      Lean cuts of beef and pork (loin, leg, round), skinless chicken and turkey    Legumes, soy products, egg whites, or nuts    Limit or do not include the following in your heart healthy plan:   Foods and liquids that contain unhealthy fats and oils:      Whole or 2% milk, cream cheese, sour cream, or cheese    High-fat cuts of beef (T-bone steaks, ribs), chicken or turkey with skin, and organ meats such as liver    Butter, stick margarine, shortening, and cooking oils such as coconut or palm oil    Foods and liquids high in sodium:      Packaged foods, such as frozen dinners, cookies, macaroni and cheese, and cereals with more than 300 mg of sodium per serving    Vegetables with added sodium, such as instant potatoes, vegetables with added sauces, or regular canned vegetables    Cured or smoked meats, such as hot dogs, gonzales, and sausage    High-sodium ketchup, barbecue sauce, salad dressing, pickles, olives, soy sauce, or miso    Foods and liquids high in sugar:      Candy, cake, cookies, pies, or doughnuts    Soft drinks (soda), sports drinks, or sweetened tea    Canned or dry mixes for cakes, soups, sauces, or gravies    Other healthy heart guidelines:   Do not smoke  Nicotine and other chemicals in cigarettes and cigars can cause lung and heart damage  Ask your healthcare provider for information if you currently smoke and need help to quit  E-cigarettes or smokeless tobacco still contain nicotine  Talk to your provider before you use these products  Limit or do not drink alcohol as directed  Alcohol can damage your heart and raise your blood pressure  Your provider may give you specific daily and weekly limits  The general recommended limit is 1 drink a day for women 21 or older and for men 72 or older  Do not have more than 3 drinks within 24 hours or 7 within a week  The recommended limit is 2 drinks a day for men 24to 59years of age  Do not have more than 4 drinks within 24 hours or 14 within a week  A drink of alcohol is 12 ounces of beer, 5 ounces of wine, or 1½ ounces of liquor  Maintain a healthy weight  Extra body weight makes your heart work harder  Ask your provider what a healthy weight is for you  He or she can help you create a safe weight loss plan, if needed  Exercise regularly  Exercise can help you maintain a healthy weight and improve your blood pressure and cholesterol levels  Regular exercise can also decrease your risk for heart problems  Ask your provider about the best exercise plan for you  Do not start an exercise program without asking your provider  Follow up with your doctor or cardiologist as directed:  Write down your questions so you remember to ask them during your visits  © Copyright [facility] 2022 Information is for End User's use only and may not be sold, redistributed or otherwise used for commercial purposes  The above information is an  only  It is not intended as medical advice for individual conditions or treatments  Talk to your doctor, nurse or pharmacist before following any medical regimen to see if it is safe and effective for you

## 2023-03-13 ENCOUNTER — TELEPHONE (OUTPATIENT)
Dept: SLEEP CENTER | Facility: CLINIC | Age: 53
End: 2023-03-13

## 2023-03-13 NOTE — TELEPHONE ENCOUNTER
Rx for BiPAP, office note and CPAP study sent to [facility] via [address]  No diagnostic study on chart    Per notes, patient had diagnostic study in 2018 at [facility]

## 2023-03-15 ENCOUNTER — OFFICE VISIT (OUTPATIENT)
Dept: PHYSICAL THERAPY | Age: 53
End: 2023-03-15

## 2023-03-15 ENCOUNTER — APPOINTMENT (OUTPATIENT)
Dept: LAB | Facility: CLINIC | Age: 53
End: 2023-03-15

## 2023-03-15 DIAGNOSIS — M25.551 RIGHT HIP PAIN: Primary | ICD-10-CM

## 2023-03-15 DIAGNOSIS — E03.9 HYPOTHYROIDISM: ICD-10-CM

## 2023-03-15 DIAGNOSIS — M54.2 CERVICALGIA: ICD-10-CM

## 2023-03-15 LAB
T4 FREE SERPL-MCNC: 0.99 NG/DL (ref 0.76–1.46)
TSH SERPL DL<=0.05 MIU/L-ACNC: 4.51 UIU/ML (ref 0.45–4.5)

## 2023-03-15 NOTE — PROGRESS NOTES
PT Re-Evaluation     Today's date: 3/15/2023  Patient name: Ahmet Cantor  : 1970  MRN: [id number]  Referring provider: JHONATAN Norman  Dx:   Encounter Diagnosis     ICD-10-CM    1  Right hip pain  M25 551       2  Cervicalgia  M54 2                      Assessment  Assessment details: Patient seen for PT re-assessment for cervical pain  Patient presents with decreased and painful cervical movements, no complaints of radicular pain; presents as possible postural weakness and poor posture as cause for cervical pain  PT implemented manual work and new exercises for home for cervical spine  Patient would be a good candidate to add exercises for cervical pain as well  Patient is making progress as well with hip pain, modified treatment for addition of cervical assessment and exercises today  Impairments: abnormal or restricted ROM, activity intolerance, impaired physical strength, lacks appropriate home exercise program, pain with function, poor posture  and poor body mechanics  Functional limitations: Patient reports hip pain with twisting, standing, walking  Neck pain with reading, watching TVUnderstanding of Dx/Px/POC: good   Prognosis: good    Goals  Impairment Goals to be met within 4 weeks  - Decrease pain by 25% with activity progressing  - Improve ROM to [facility] hip ER progressing  - Increase strength by 1/2 MMT throughout progressing       Functional Goals to be met within 4-6 weeks  - Patient will be independent with HEP progressing  - Reduce hip pain with twisting and turning    progressing    Goals added:   - Decrease cervical pain to 3/10 at rest and with reading progressing  - Improve postural strength progressing  - Patient to be I with HEP progressing        Plan  Patient would benefit from: skilled physical therapy  Planned modality interventions: cryotherapy and thermotherapy: hydrocollator packs  Planned therapy interventions: abdominal trunk stabilization, joint mobilization, manual therapy, neuromuscular re-education, patient education, postural training, strengthening, stretching, therapeutic activities, therapeutic exercise, home exercise program, gait training, body mechanics training and balance  Frequency: 1x week  Duration in weeks: 4  Treatment plan discussed with: family        Subjective Evaluation    History of Present Illness  Mechanism of injury: Patient reports that she has been having neck pain, L sided at the base of the skull, near her ear  Patient has seen a chiropractor off/on, currently not seeing  Patient discussed her neck pain with her PCP and was recommended for PT for the cervical spine as well  Patient does feel the hip is getting better, is trying to stretch at home as best she can with her work schedule  Pain  Current pain rating: 3  At best pain rating: 3  At worst pain ratin  Location: R hip ; cervical spine 5/10 currently 8/10 at worst  Quality: dull ache and sharp  Aggravating factors: sitting, standing, walking and stair climbing  Progression: no change    Social Support    Employment status: working    Diagnostic Tests  X-ray: normal  Treatments  Previous treatment: occupational therapy  Patient Goals  Patient goals for therapy: decreased pain, increased motion, increased strength and independence with ADLs/IADLs          Objective     Concurrent Complaints  Positive for disturbed sleep and headaches  Negative for night pain, dizziness, faints, nausea/motion sickness, tinnitus, trouble swallowing, difficulty breathing, shortness of breath, respiratory pain and visual change    Additional Special Questions  H/o migraines, does have botox injections for migraines- feels the botox has been managing her migraines       Patient with a constant headache at the L side of her cervical spine, will radiate up to the L lateral head    Postural Observations  Seated posture: fair        Neurological Testing     Sensation   Cervical/Thoracic   Left   Intact: light touch    Right   Intact: light touch    Active Range of Motion   Cervical/Thoracic Spine       Cervical    Flexion: 50 degrees   Extension: 37 degrees      Left lateral flexion: 17 degrees     with pain  Right lateral flexion: 20 degrees     with pain  Left rotation: 62 degrees  Right rotation: 63 degrees         Left Shoulder   Flexion: WFL    Right Shoulder   Flexion: WFL  Left Hip   Normal active range of motion  Flexion: with pain  External rotation (90/90): with pain    Right Hip   Normal active range of motion  Left Knee   Normal active range of motion    Right Knee   Normal active range of motion  Left Ankle/Foot   Normal active range of motion    Right Ankle/Foot   Normal active range of motion    Additional Active Range of Motion Details  L hip pain with flexion and limited with ER on L compared to R    Strength/Myotome Testing     Left Shoulder     Planes of Motion   Flexion: 4   Abduction: 4     Right Shoulder     Planes of Motion   Flexion: 4   Abduction: 4     Left Hip   Planes of Motion   Flexion: 4  Extension: 4-  Abduction: 4  Adduction: 4    Right Hip   Planes of Motion   Flexion: 4  Extension: 4-  Abduction: 4  Adduction: 4    Left Knee   Flexion: 4  Extension: 4    Right Knee   Flexion: 4  Extension: 4    Left Ankle/Foot   Dorsiflexion: 5  Plantar flexion: 5    Right Ankle/Foot   Dorsiflexion: 5  Plantar flexion: 5  Neuro Exam:     Headaches   Patient reports headaches: Yes                Precautions: psych history  Supine with wedge    Manuals 2/9- IE 3/8 3/15        Cervical SOR, manual distraction x 5'        L UT stretch 3x:20        L levator stretch 3x:20             Neuro Re-Ed                sidestepping   -     Step ups FW, lateral   -     squats   -                             Ther Ex                Nu step  x10' x10'             IT band stretch  3x:20 5x:20     Ham stretch  3x:20 5x:20     Standing hip flexor stretch                bridges  2x10 2x10     SAQ  3# 2x10 3# 2x10 SLR flex  10x 10x     LAQ  2x10              SLR hip abd, stand  2x10 -     Ham curls  2x10 -     HR  20x -                             Ther Activity                        Gait Training                        Modalities        MH vs CP PRN   MH cervical 10' seated             Progress as able

## 2023-03-16 DIAGNOSIS — E03.9 ACQUIRED HYPOTHYROIDISM: Primary | Chronic | ICD-10-CM

## 2023-03-16 LAB

## 2023-03-16 RX ORDER — LEVOTHYROXINE SODIUM 0.12 MG/1
125 TABLET ORAL
Qty: 90 TABLET | Refills: 3 | Status: SHIPPED | OUTPATIENT
Start: 2023-03-16

## 2023-03-22 ENCOUNTER — OFFICE VISIT (OUTPATIENT)
Dept: PHYSICAL THERAPY | Age: 53
End: 2023-03-22

## 2023-03-22 DIAGNOSIS — M25.551 RIGHT HIP PAIN: Primary | ICD-10-CM

## 2023-03-22 DIAGNOSIS — M54.2 CERVICALGIA: ICD-10-CM

## 2023-03-22 NOTE — PROGRESS NOTES
Daily Note     Today's date: 3/22/2023  Patient name: Blair Barboza  : 1970  MRN: [id number]  Referring provider: JHONATAN Renee  Dx:   Encounter Diagnosis     ICD-10-CM    1  Right hip pain  M25 551       2  Cervicalgia  M54 2                      Subjective: Patient reports that her neck is feeling better with her stretches and exercises; however, still having neck pain  Patient also reports less hip pain today compared to previous sessions  Neck pain 5/10, hip pain /10 today  Objective: See treatment diary below      Assessment: Tolerated treatment well  Patient demonstrated fatigue post treatment, exhibited good technique with therapeutic exercises and would benefit from continued PT Progressed reps where able to tolerate  Progressing well overall with therapy for both cervical spine pain and for LE strengthening  Plan: Continue per plan of care  Scheduled for additional 4 weeks, making progress and strength is improving slowly, pain decreasing       Precautions: psych history  Supine with wedge    Manuals - IE 3/8 3/15 3/22       Cervical SOR, manual distraction x 5' x5'       L UT stretch 3x:20 3x:20       L levator stretch 3x:20 3x:20            Neuro Re-Ed                sidestepping   -     Step ups FW, lateral   -     squats   -                             Ther Ex                Nu step  x10' x10' x10'            IT band stretch  3x:20 5x:20 5x:20    Ham stretch  3x:20 5x:20 5x:20    Standing hip flexor stretch                bridges  2x10 2x10 2x10    SAQ  3# 2x10 3# 2x10 3# 2x10    SLR flex  10x 10x 15x    LAQ  2x10  10x            SLR hip abd, stand  2x10 -     Ham curls  2x10 -     HR  20x -                             Ther Activity                        Gait Training                        Modalities        MH vs CP PRN    cervical 10' seated NT            Progress as able

## 2023-03-23 ENCOUNTER — OFFICE VISIT (OUTPATIENT)
Dept: FAMILY MEDICINE CLINIC | Facility: CLINIC | Age: 53
End: 2023-03-23

## 2023-03-23 VITALS
HEIGHT: 65 IN | OXYGEN SATURATION: 96 % | TEMPERATURE: 97.4 F | SYSTOLIC BLOOD PRESSURE: 110 MMHG | WEIGHT: 283 LBS | BODY MASS INDEX: 47.15 KG/M2 | HEART RATE: 91 BPM | DIASTOLIC BLOOD PRESSURE: 70 MMHG

## 2023-03-23 DIAGNOSIS — R30.0 DYSURIA: Primary | ICD-10-CM

## 2023-03-23 LAB
AMORPH URATE CRY URNS QL MICRO: ABNORMAL
BACTERIA UR QL AUTO: ABNORMAL /HPF
BILIRUB UR QL STRIP: NEGATIVE
CLARITY UR: CLEAR
COLOR UR: YELLOW
DME PARACHUTE DELIVERY DATE ACTUAL: NORMAL
DME PARACHUTE DELIVERY DATE REQUESTED: NORMAL
DME PARACHUTE ITEM DESCRIPTION: NORMAL
DME PARACHUTE ORDER STATUS: NORMAL
DME PARACHUTE SUPPLIER NAME: NORMAL
DME PARACHUTE SUPPLIER PHONE: NORMAL
GLUCOSE UR STRIP-MCNC: NEGATIVE MG/DL
HGB UR QL STRIP.AUTO: NEGATIVE
KETONES UR STRIP-MCNC: NEGATIVE MG/DL
LEUKOCYTE ESTERASE UR QL STRIP: ABNORMAL
MUCOUS THREADS UR QL AUTO: ABNORMAL
NITRITE UR QL STRIP: NEGATIVE
NON-SQ EPI CELLS URNS QL MICRO: ABNORMAL /HPF
PH UR STRIP.AUTO: 6.5 [PH]
PROT UR STRIP-MCNC: NEGATIVE MG/DL
RBC #/AREA URNS AUTO: ABNORMAL /HPF
SL AMB  POCT GLUCOSE, UA: ABNORMAL
SL AMB LEUKOCYTE ESTERASE,UA: ABNORMAL
SL AMB POCT BILIRUBIN,UA: ABNORMAL
SL AMB POCT BLOOD,UA: ABNORMAL
SL AMB POCT CLARITY,UA: CLEAR
SL AMB POCT COLOR,UA: YELLOW
SL AMB POCT KETONES,UA: ABNORMAL
SL AMB POCT NITRITE,UA: ABNORMAL
SL AMB POCT PH,UA: 6.5
SL AMB POCT SPECIFIC GRAVITY,UA: 1.01
SL AMB POCT URINE PROTEIN: ABNORMAL
SL AMB POCT UROBILINOGEN: ABNORMAL
SP GR UR STRIP.AUTO: 1.02 (ref 1–1.03)
UROBILINOGEN UR STRIP-ACNC: <2 MG/DL
WBC #/AREA URNS AUTO: ABNORMAL /HPF

## 2023-03-23 RX ORDER — PHENAZOPYRIDINE HYDROCHLORIDE 100 MG/1
100 TABLET, FILM COATED ORAL 3 TIMES DAILY PRN
Qty: 10 TABLET | Refills: 0 | Status: SHIPPED | OUTPATIENT
Start: 2023-03-23

## 2023-03-23 NOTE — LETTER
March 23, 2023     Patient: Yung Angeles  YOB: 1970  Date of Visit: 3/23/2023      To Whom it May Concern:    Kayley Lieberman is under my professional care  Bev Resendiz was seen in my office on 3/23/2023  Bev Resendiz may return to work on 03/27/2023  If you have any questions or concerns, please don't hesitate to call           Sincerely,          JHONATAN Monteiro        CC:   No Recipients

## 2023-03-23 NOTE — PROGRESS NOTES
Subjective:   Chief Complaint   Patient presents with   • Frequent UTI     Discomfort , incomplete emptying  Patient ID: Ana Shepherd is a 47 y o  female  Patient presents today for painful urination  She reports ongoing urinary problems and has received pelvic therapy in the past  She states that she "forgets" to drink and believes this is contributing to her urinary discomfort  Reports increased urination, slight discomfort, incomplete emptying, and a strong odor  Denies any blood in the urine, abdominal pain, or cramping  She has not tried any over-the-counter medications  The following portions of the patient's history were reviewed and updated as appropriate: allergies, current medications, past family history, past medical history, past social history, past surgical history and problem list     Review of Systems   Constitutional: Negative for chills, fatigue and fever  Respiratory: Negative for chest tightness and shortness of breath  Cardiovascular: Negative for chest pain and palpitations  Genitourinary: Positive for difficulty urinating and urgency  Negative for dysuria, frequency, hematuria, pelvic pain, vaginal bleeding, vaginal discharge and vaginal pain  Psychiatric/Behavioral: Negative for dysphoric mood (treated)  The patient is not nervous/anxious (treated)  Objective:  Vitals:    03/23/23 1514   BP: 110/70   BP Location: Left arm   Patient Position: Sitting   Cuff Size: Large   Pulse: 91   Temp: (!) 97 4 °F (36 3 °C)   TempSrc: Tympanic   SpO2: 96%   Weight: 128 kg (283 lb)   Height: 5' 5" (1 651 m)      Physical Exam  Vitals reviewed  Constitutional:       Appearance: Normal appearance  She is obese  Cardiovascular:      Rate and Rhythm: Normal rate and regular rhythm  Pulses: Normal pulses  Heart sounds: Normal heart sounds  Pulmonary:      Effort: Pulmonary effort is normal       Breath sounds: Normal breath sounds     Abdominal: General: Bowel sounds are normal       Palpations: Abdomen is soft  Tenderness: There is no abdominal tenderness  There is no right CVA tenderness or left CVA tenderness  Skin:     General: Skin is warm and dry  Capillary Refill: Capillary refill takes less than 2 seconds  Neurological:      General: No focal deficit present  Mental Status: She is alert and oriented to person, place, and time  Psychiatric:         Mood and Affect: Mood normal          Behavior: Behavior normal            Assessment/Plan:    No problem-specific Assessment & Plan notes found for this encounter  Diagnoses and all orders for this visit:    Dysuria  -     phenazopyridine (PYRIDIUM) 100 mg tablet;  Take 1 tablet (100 mg total) by mouth 3 (three) times a day as needed for bladder spasms  -     UA w Reflex to Microscopic w Reflex to Culture - Clinic Collect  -     POCT urine dip

## 2023-03-28 ENCOUNTER — OFFICE VISIT (OUTPATIENT)
Dept: FAMILY MEDICINE CLINIC | Facility: CLINIC | Age: 53
End: 2023-03-28

## 2023-03-28 VITALS
BODY MASS INDEX: 48.72 KG/M2 | HEIGHT: 65 IN | OXYGEN SATURATION: 96 % | HEART RATE: 79 BPM | TEMPERATURE: 96.4 F | SYSTOLIC BLOOD PRESSURE: 124 MMHG | WEIGHT: 292.4 LBS | DIASTOLIC BLOOD PRESSURE: 86 MMHG

## 2023-03-28 DIAGNOSIS — D69.6 PLATELETS DECREASED (HCC): ICD-10-CM

## 2023-03-28 DIAGNOSIS — L25.9 CONTACT DERMATITIS, UNSPECIFIED CONTACT DERMATITIS TYPE, UNSPECIFIED TRIGGER: Primary | ICD-10-CM

## 2023-03-28 NOTE — LETTER
March 28, 2023     Patient: Heavenly Greene  YOB: 1970  Date of Visit: 3/28/2023      To Whom it May Concern:    Igor Willard is under my professional care  Dasha Monaco was seen in my office on 3/28/2023  Dasha Monaco may return to work on 03/31/2023  Please excuse patient from work on 03/28/2023  If you have any questions or concerns, please don't hesitate to call           Sincerely,              JHONATAN Jimenes

## 2023-03-28 NOTE — PROGRESS NOTES
"  Subjective:   Chief Complaint   Patient presents with   • Eye Problem        Patient ID: Yuni Nova is a 47 y o  female  Presents today with itchy, dry, red eyes x1 week  Patient states she has been crying a lot recently due to her supervisor leaving and she believes this has started the flare up, by her constantly rubbing her eyes  She reports this has happened in the past, but she thinks its been about a year since her last episode  She has applied vaseline around her eyes and that has significantly helped the burning sensation  She has also used warm compresses as needed for relief  She reports changes in her vision due to the swelling and pain around her eyes  The following portions of the patient's history were reviewed and updated as appropriate: allergies, current medications, past family history, past medical history, past social history, past surgical history and problem list     Review of Systems   Constitutional: Negative for fever  Eyes: Negative for photophobia, pain (area around eyes painful), discharge, redness, itching and visual disturbance  Respiratory: Negative for shortness of breath  Cardiovascular: Negative for chest pain and palpitations  Skin: Positive for rash (around bilateral eyes)  Psychiatric/Behavioral: Negative for dysphoric mood  The patient is not nervous/anxious  Objective:  Vitals:    03/28/23 1104   BP: 124/86   BP Location: Left arm   Patient Position: Sitting   Cuff Size: Large   Pulse: 79   Temp: (!) 96 4 °F (35 8 °C)   SpO2: 96%   Weight: 133 kg (292 lb 6 4 oz)   Height: 5' 5\" (1 651 m)      Physical Exam  Constitutional:       Appearance: Normal appearance  She is obese  Eyes:      Extraocular Movements: Extraocular movements intact  Conjunctiva/sclera: Conjunctivae normal       Pupils: Pupils are equal, round, and reactive to light  Comments: erythema and edema around both eyes bilaterally      Cardiovascular:      " Rate and Rhythm: Normal rate and regular rhythm  Pulses: Normal pulses  Heart sounds: Normal heart sounds  No murmur heard  No friction rub  No gallop  Pulmonary:      Effort: Pulmonary effort is normal  No respiratory distress  Breath sounds: Normal breath sounds  No stridor  No wheezing, rhonchi or rales  Chest:      Chest wall: No tenderness  Skin:     General: Skin is warm and dry  Capillary Refill: Capillary refill takes less than 2 seconds  Findings: Erythema and rash present  Rash is macular (around bilateral eyes)  Neurological:      Mental Status: She is alert and oriented to person, place, and time  Psychiatric:         Mood and Affect: Mood normal          Behavior: Behavior normal            Assessment/Plan:    No problem-specific Assessment & Plan notes found for this encounter  Diagnoses and all orders for this visit:    Contact dermatitis, unspecified contact dermatitis type, unspecified trigger  -     hydrocortisone 2 5 % ointment;  Apply topically 2 (two) times a day    Platelets decreased (HCC)  Comments:  Platelet count Feb 5360 normal at 172

## 2023-03-29 ENCOUNTER — OFFICE VISIT (OUTPATIENT)
Dept: PHYSICAL THERAPY | Age: 53
End: 2023-03-29

## 2023-03-29 DIAGNOSIS — M54.2 CERVICALGIA: ICD-10-CM

## 2023-03-29 DIAGNOSIS — M25.551 RIGHT HIP PAIN: Primary | ICD-10-CM

## 2023-03-29 DIAGNOSIS — I10 HYPERTENSION, UNSPECIFIED TYPE: ICD-10-CM

## 2023-03-29 RX ORDER — LOSARTAN POTASSIUM 50 MG/1
TABLET ORAL
Qty: 30 TABLET | Refills: 9 | Status: SHIPPED | OUTPATIENT
Start: 2023-03-29

## 2023-03-29 NOTE — PROGRESS NOTES
Daily Note     Today's date: 3/29/2023  Patient name: Shanta Vasquez  : 1970  MRN: [id number]  Referring provider: JHONATAN Louis  Dx:   Encounter Diagnosis     ICD-10-CM    1  Right hip pain  M25 551       2  Cervicalgia  M54 2                      Subjective: Patient reports that her hip and her neck are feeling better with PT  Patient coming in to PT today with very red and swollen eyes; does note this is better than yesterday  Patient reports that she was crying a lot, had dry eyes and was rubbing them constantly which caused them to swell  Patient has seen her PCP for this and is using her drops or cream appropriately  Objective: See treatment diary below      Assessment: Tolerated treatment well  Patient demonstrated fatigue post treatment, exhibited good technique with therapeutic exercises and would benefit from continued PT  Patient still having tightness at L side, especially with UT stretch  Patient does feel stretching and exercising is helping with her hip pain  Limited by B knee OA with TE- still modifying exercises to allow for knee pain  Patient has been trying her exercises at home and is tolerating therapy well  Recommending patient to continue  Plan: Continue per plan of care  Patient will be having an EGD procedure coming up end of April- may need to hold/dc PT pending surgery       Precautions: psych history  Supine with wedge    Manuals - IE 3/8 3/15 3/22 3/29      Cervical SOR, manual distraction x 5' x5' x5'      L UT stretch 3x:20 3x:20 5x:20      L levator stretch 3x:20 3x:20 5x:20           Neuro Re-Ed                sidestepping   -     Step ups FW, lateral   -     squats   -                             Ther Ex                Nu step  x10' x10' x10' x10' L1           IT band stretch  3x:20 5x:20 5x:20 5x:20   Ham stretch  3x:20 5x:20 5x:20    Standing hip flexor stretch                bridges  2x10 2x10 2x10 2x10   SAQ  3# 2x10 3# 2x10 3# 2x10 3# 2x10   SLR flex  10x 10x 15x 2x10   LAQ  2x10  10x 2x10           SLR hip abd, stand  2x10 -     Ham curls  2x10 -     HR  20x -                             Ther Activity                        Gait Training                        Modalities        MH vs CP PRN   MH cervical 10' seated NT NT           Progress as able

## 2023-04-03 ENCOUNTER — TELEPHONE (OUTPATIENT)
Dept: OBGYN CLINIC | Facility: CLINIC | Age: 53
End: 2023-04-03

## 2023-04-05 ENCOUNTER — OFFICE VISIT (OUTPATIENT)
Dept: OCCUPATIONAL THERAPY | Age: 53
End: 2023-04-05

## 2023-04-05 ENCOUNTER — APPOINTMENT (OUTPATIENT)
Dept: PHYSICAL THERAPY | Age: 53
End: 2023-04-05

## 2023-04-05 DIAGNOSIS — M20.032 SWAN-NECK DEFORMITY OF FINGER, LEFT: Primary | ICD-10-CM

## 2023-04-05 NOTE — PROGRESS NOTES
Splint    Diagnosis:   1  [place]-neck deformity of finger, left           Indication: pt has a swan neck deformity     Location: Right  long finger  Supplies: Orthotics  Thermoplastic material    Splint type: FO static - oval 8 splint  Wearing Schedule: wear as often as possible, especially when active to prevent PIP hyperextension  Describe Position:  PIP flexion     Precautions: swan neck deformity     Sized for oval 8 splint sizes 6, 7, 8    Patient or Caregiver expresses understanding of wearing Schedule and Precautions? Yes  Patient or Caregiver able to don/doff orthotic independently? Yes    Written orders provided to patient?  Yes  Orders Obtained: Written  Orders Obtained from: Dr Mary Lou Gardner

## 2023-04-05 NOTE — TELEPHONE ENCOUNTER
Left VM for patient to call office that this medication she wanted is  not substitute estradiol  The medication she is referring to is a treatment for osteoporosis  She is taking estradiol for menopausal symptoms and therefore is not interchangeable  She can come in for an office visit for further treatment options

## 2023-04-25 DIAGNOSIS — I10 HYPERTENSION, UNSPECIFIED TYPE: ICD-10-CM

## 2023-04-25 RX ORDER — FUROSEMIDE 20 MG/1
TABLET ORAL
Qty: 30 TABLET | Refills: 0 | Status: SHIPPED | OUTPATIENT
Start: 2023-04-25

## 2023-04-26 ENCOUNTER — APPOINTMENT (OUTPATIENT)
Dept: PHYSICAL THERAPY | Age: 53
End: 2023-04-26

## 2023-04-26 NOTE — PROGRESS NOTES
Daily Note     Today's date: 2023  Patient name: Tish Ness  : 1970  MRN: [id number]  Referring provider: JHONATAN Jennings  Dx: No diagnosis found  Subjective: ***      Objective: See treatment diary below      Assessment: Tolerated treatment {Tolerated treatment :[id number]}  Patient {assessment:[id number]}      Plan: Continue per plan of care        Precautions: psych history  Supine with wedge    Manuals 4/12 4/19  3/22 3/29    Cervical SOR, manual distraction x 5'    Cervical SOR, manual distraction x 5'  x5' x5'    L UT stretch 3x:20  L UT stretch 3x:20   3x:20 5x:20    L levator stretch 3x:20 L levator stretch 3x:20  3x:20 5x:20           Neuro Re-Ed                sidestepping        Step ups FW, lateral        squats                                Ther Ex                Nu step x10' x10'  x10' x10' L1           IT band stretch 3x:20 3x:20  5x:20 5x:20   Ham stretch 5x:20 3x:20  5x:20    Standing hip flexor stretch                bridges 2x10 2x10  2x10 2x10   SAQ 3# 2x10 3# 2x10  3# 2x10 3# 2x10   SLR flex Held back pain 10x  15x 2x10   LAQ - held knee pain 2x10  10x 2x10    Supine DLS marches 2# U51 Supine DLS marches 2# W13      SLR hip abd, stand -       Ham curls -       HR -                               Ther Activity                        Gait Training                        Modalities        MH vs CP PRN Declined today   NT NT           Progress as able

## 2023-05-03 ENCOUNTER — OFFICE VISIT (OUTPATIENT)
Dept: FAMILY MEDICINE CLINIC | Facility: CLINIC | Age: 53
End: 2023-05-03

## 2023-05-03 ENCOUNTER — OFFICE VISIT (OUTPATIENT)
Dept: PHYSICAL THERAPY | Age: 53
End: 2023-05-03

## 2023-05-03 VITALS
HEIGHT: 65 IN | WEIGHT: 280.4 LBS | DIASTOLIC BLOOD PRESSURE: 78 MMHG | SYSTOLIC BLOOD PRESSURE: 124 MMHG | BODY MASS INDEX: 46.72 KG/M2 | TEMPERATURE: 97.8 F | HEART RATE: 95 BPM | OXYGEN SATURATION: 97 %

## 2023-05-03 DIAGNOSIS — M25.551 RIGHT HIP PAIN: Primary | ICD-10-CM

## 2023-05-03 DIAGNOSIS — M54.2 CERVICALGIA: ICD-10-CM

## 2023-05-03 DIAGNOSIS — L85.3 XEROSIS OF SKIN: Primary | ICD-10-CM

## 2023-05-03 NOTE — PROGRESS NOTES
"     Subjective:   Chief Complaint   Patient presents with    heel crack     Dry, hurts at night , hurts with pressure         Patient ID: Kate Lo is a 47 y o  female  Presents today for complaints of left heel crack  This has been an ongoing issue for several months  She has been applying lotion without improvement  The following portions of the patient's history were reviewed and updated as appropriate: allergies, current medications, past family history, past medical history, past social history, past surgical history and problem list     Review of Systems   Constitutional: Negative for chills, fatigue and fever  Respiratory: Negative for cough and shortness of breath  Cardiovascular: Negative for chest pain and palpitations  Skin: Positive for wound (left heel)  Psychiatric/Behavioral: Negative for dysphoric mood (being treated)  The patient is not nervous/anxious (being treated)  Objective:  Vitals:    05/03/23 1450   BP: 124/78   BP Location: Left arm   Patient Position: Sitting   Cuff Size: Large   Pulse: 95   Temp: 97 8 °F (36 6 °C)   TempSrc: Temporal   SpO2: 97%   Weight: 127 kg (280 lb 6 4 oz)   Height: 5' 5\" (1 651 m)      Physical Exam  Vitals reviewed  Constitutional:       Appearance: Normal appearance  She is obese  Cardiovascular:      Rate and Rhythm: Normal rate and regular rhythm  Pulses: Normal pulses  Heart sounds: Normal heart sounds  Pulmonary:      Effort: Pulmonary effort is normal       Breath sounds: Normal breath sounds  Skin:     General: Skin is warm and dry  Capillary Refill: Capillary refill takes less than 2 seconds  Findings: Wound (left heel) present  Neurological:      Mental Status: She is alert and oriented to person, place, and time     Psychiatric:         Mood and Affect: Mood normal          Behavior: Behavior normal            Assessment/Plan:    No problem-specific Assessment & Plan notes found for " this encounter         Diagnoses and all orders for this visit:    Xerosis of skin  Comments:  Apply lotion twice daily and at night after minerva lotion cover with plastic wrap

## 2023-05-03 NOTE — PROGRESS NOTES
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 day  Objective: See treatment diary below      Assessment: Tolerated treatment well  Patient demonstrated fatigue post treatment, exhibited good technique with therapeutic exercises and would benefit from continued PT Patient tighter on the L side compared to the R side  Held off on knee exercises today 2* scheduling error on PT end; patient has also been having more knee pain overall and will be seeing her doctor and having blood work  Plan: Continue per plan of care        Precautions: psych history  Supine with wedge    Manuals 4/12 4/19 5/3 3/22 3/29    Cervical SOR, manual distraction x 5'    Cervical SOR, manual distraction x 5' Cervical SOR, manual distraction x 5' x5' x5'    L UT stretch 3x:20  L UT stretch 3x:20  L UT stretch 3x:20 3x:20 5x:20    L levator stretch 3x:20 L levator stretch 3x:20 L levator stretch 3x:20 3x:20 5x:20           Neuro Re-Ed                sidestepping   -     Step ups FW, lateral   -     squats   -                             Ther Ex                Nu step x10' x10' - x10' x10' L1           IT band stretch 3x:20 3x:20 - 5x:20 5x:20   Ham stretch 5x:20 3x:20 - 5x:20    Standing hip flexor stretch                bridges 2x10 2x10 - 2x10 2x10   SAQ 3# 2x10 3# 2x10 - 3# 2x10 3# 2x10   SLR flex Held back pain 10x - 15x 2x10   LAQ - held knee pain 2x10  10x 2x10    Supine DLS marches 2# Y69 Supine DLS marches 2# Q45      SLR hip abd, stand -  -     Ham curls -  -     HR -  -                             Ther Activity Gait Training                        Modalities        MH vs CP PRN Declined today  MH cervical 10' seated NT NT           Progress as able

## 2023-05-05 ENCOUNTER — HOSPITAL ENCOUNTER (EMERGENCY)
Facility: HOSPITAL | Age: 53
Discharge: HOME | End: 2023-05-05
Attending: EMERGENCY MEDICINE

## 2023-05-05 ENCOUNTER — APPOINTMENT (EMERGENCY)
Dept: VASCULAR ULTRASOUND | Facility: HOSPITAL | Age: 53
End: 2023-05-05

## 2023-05-05 ENCOUNTER — APPOINTMENT (EMERGENCY)
Dept: RADIOLOGY | Facility: HOSPITAL | Age: 53
End: 2023-05-05

## 2023-05-05 VITALS
DIASTOLIC BLOOD PRESSURE: 81 MMHG | SYSTOLIC BLOOD PRESSURE: 151 MMHG | RESPIRATION RATE: 16 BRPM | OXYGEN SATURATION: 95 % | HEART RATE: 77 BPM | TEMPERATURE: 98.2 F

## 2023-05-05 DIAGNOSIS — S76.311A STRAIN OF RIGHT HAMSTRING MUSCLE, INITIAL ENCOUNTER: ICD-10-CM

## 2023-05-05 DIAGNOSIS — M25.561 RIGHT KNEE PAIN: Primary | ICD-10-CM

## 2023-05-05 RX ORDER — KETOROLAC TROMETHAMINE 30 MG/ML
15 INJECTION, SOLUTION INTRAMUSCULAR; INTRAVENOUS ONCE
Status: COMPLETED | OUTPATIENT
Start: 2023-05-05 | End: 2023-05-05

## 2023-05-05 RX ORDER — ACETAMINOPHEN 325 MG/1
975 TABLET ORAL ONCE
Status: COMPLETED | OUTPATIENT
Start: 2023-05-05 | End: 2023-05-05

## 2023-05-05 RX ADMIN — ACETAMINOPHEN 975 MG: 325 TABLET ORAL at 12:14

## 2023-05-05 RX ADMIN — KETOROLAC TROMETHAMINE 15 MG: 30 INJECTION, SOLUTION INTRAMUSCULAR; INTRAVENOUS at 12:14

## 2023-05-05 NOTE — Clinical Note
Chet Faye was seen and treated in our emergency department on 5/5/2023  Avoid frequent bending or heavy lifting  Allow patient to sit and elevate the extremity as needed  Diagnosis:     Catina Love  may return to work on return date  She may return on this date: 05/09/2023         If you have any questions or concerns, please don't hesitate to call        Patti James PA-C    ______________________________           _______________          _______________  Hospital Representative                              Date                                Time

## 2023-05-05 NOTE — ED PROVIDER NOTES
"History  Chief Complaint   Patient presents with    Knee Pain     Pt had knee replacement last February  Today was getting out of the car and felt like she pulled a muscle behind her R knee  Patient is a 46 y/o female with a PMHx of arthritis, asthma, COPD, GERD, HLD, HTN and sleep apnea, presenting to the ED for evaluation of right knee pain x1 month  Patient had a right total knee arthroplasty on 2/23/22 at [facility]  She states that she was doing well up until a month ago when she started to develop pain and stiffness in the joint  She denies any falls, injury or changes in activity level preceding this  She feels like the knee intermittently \"locks up\" and she has difficulty bending the knee when this occurs  She reports intermittent swelling of the knee but does not feel that it is significantly swollen at this time  She denies any fevers, chills or redness/warmth of the joint  She denies any sensation of the knee giving out on her  She denies any numbness/weakness in the extremity  She is ambulating with the use of a cane but has had difficulty ambulating secondary to pain  She was seen by her surgeon 3 days ago and had x-rays that were unremarkable  She was encouraged to rest, ice the knee and take NSAIDs such as Aleve for the next 2 weeks  She states that she was getting out of the car today when she felt a sudden pull behind her right knee and is now having worsening pain which prompted her to come to the ED for evaluation  Prior to Admission Medications   Prescriptions Last Dose Informant Patient Reported? Taking?    Cholecalciferol (Vitamin D3) 125 MCG (5000 UT) CAPS   Yes No   Sig: Take by mouth in the morning   Fesoterodine Fumarate ER (Toviaz) 4 MG TB24   Yes No   Sig: Take 1 tablet by mouth daily   Lurasidone HCl 60 MG TABS   No No   Sig: Take 1 tablet (60 mg total) by mouth 2 (two) times a day TAKEN IN THE MORNING AND AT NIGHT-----Latuda   RABEprazole (ACIPHEX) 20 MG tablet   Yes No   Sig: " Take 20 mg by mouth 2 (two) times a day   albuterol (Ventolin HFA) 90 mcg/act inhaler   No No   Sig: Inhale 2 puffs every 6 (six) hours as needed for wheezing   amLODIPine (NORVASC) 5 mg tablet   No No   Sig: TAKE 1 TABLET (5 MG TOTAL) BY MOUTH DAILY   buPROPion (WELLBUTRIN XL) 150 mg 24 hr tablet   No No   Sig: Take 1 tablet (150 mg total) by mouth daily   Patient not taking: Reported on 3/9/2023   buPROPion (WELLBUTRIN XL) 300 mg 24 hr tablet   Yes No   Sig: Take 300 mg by mouth daily   carBAMazepine (CARBATROL) 300 MG 12 hr capsule   Yes No   Sig: Take 1 capsule by mouth in the morning   colestipol (COLESTID) 1 g tablet   No No   Sig: Take 1 tablet (1 g total) by mouth 2 (two) times a day   divalproex sodium (DEPAKOTE ER) 250 mg 24 hr tablet   Yes No   divalproex sodium (DEPAKOTE ER) 500 mg 24 hr tablet   Yes No   Si mg 2 (two) times a day   divalproex sodium (DEPAKOTE) 250 mg EC tablet   No No   Sig: Take 3 tablets (750 mg total) by mouth every 12 (twelve) hours   estradiol (ESTRACE) 0 5 MG tablet   No No   Sig: Take 1 tablet (0 5 mg total) by mouth daily   fluticasone (FLOVENT HFA) 110 MCG/ACT inhaler   Yes No   Sig: Inhale 2 puffs as needed Rinse mouth after use     furosemide (LASIX) 20 mg tablet   No No   Sig: TAKE 1 TABLET BY MOUTH DAILY   haloperidol decanoate (Haldol Decanoate) 50 mg/mL injection   Yes No   Sig: every 30 (thirty) days   hydrOXYzine HCL (ATARAX) 25 mg tablet   No No   Sig: Take 1 tablet (25 mg total) by mouth every 6 (six) hours as needed for itching (mild anxiety)   hydrocortisone 2 5 % ointment   No No   Sig: Apply topically 2 (two) times a day   hydrocortisone 2 5 % ointment   No No   Sig: Apply topically 2 (two) times a day   levothyroxine (Euthyrox) 125 mcg tablet   No No   Sig: Take 1 tablet (125 mcg total) by mouth daily in the early morning   losartan (COZAAR) 50 mg tablet   No No   Sig: TAKE ONE TABLET BY MOUTH DAILY   methocarbamol (ROBAXIN) 500 mg tablet   No No   Sig: Take "1 tablet (500 mg total) by mouth 3 (three) times a day   metoprolol tartrate (LOPRESSOR) 25 mg tablet   No No   Sig: TAKE ONE TABLET BY MOUTH EVERY 12 HOURS   mometasone (ELOCON) 0 1 % lotion   No No   Sig: Apply topically daily   phenazopyridine (PYRIDIUM) 100 mg tablet   No No   Sig: Take 1 tablet (100 mg total) by mouth 3 (three) times a day as needed for bladder spasms   pilocarpine (SALAGEN) 5 mg tablet   Yes No   Sig: Take 5 mg by mouth 2 (two) times a day   rOPINIRole (REQUIP) 0 5 mg tablet   Yes No   Sig: Take by mouth daily at bedtime   rOPINIRole (REQUIP) 1 mg tablet   Yes No   topiramate (TOPAMAX) 100 mg tablet   No No   Sig: Take 1 5 tablets (150 mg total) by mouth 2 (two) times a day   topiramate (TOPAMAX) 200 MG tablet   Yes No   topiramate (TOPAMAX) 50 MG tablet   Yes No   Sig: Take 50 mg by mouth 2 (two) times a day   trospium chloride (SANCTURA) 20 mg tablet   Yes No   Sig: Take 20 mg by mouth 2 (two) times a day   venlafaxine (EFFEXOR) 37 5 mg tablet   Yes No   Sig: Take 37 5 mg by mouth daily      Facility-Administered Medications: None       Past Medical History:   Diagnosis Date    Anxiety     Arthritis     oa cassandra knees    Asthma     good control- no medications    Yan's esophagus     Bipolar affective disorder (HCC)     Chronic pain disorder     lumbar    COPD (chronic obstructive pulmonary disease) ([facility])     CPAP (continuous positive airway pressure) dependence     Degenerative disc disease at L5-S1 level     Deliberate self-cutting     9/15/22per pt has not done recently-- does see a therapist and psychiatrist regularly    Depression 09/16/2008    Disease of thyroid gland     hypo    MARTINEZ (dyspnea on exertion)     per pt \"has had this with exertion and not new\"    Drug overdose 10/28/2008    suicide attempt and again drug overdose 7/2022-- [facility] floor and than transferred to [facility]    Dysphagia     Dyspnea     Edema     BLE    Family history of blood clots     " GERD (gastroesophageal reflux disease)     Headache(784 0)     High blood pressure     History of COVID-19 12/30/2020    Symptoms started on 12/30/2020 and then got worse  Today she is feeling a little bit better  She is a candidate for monoclonal antibody infusion and set for 1/6/21 @ 1pm [facility]  01/07/21 - telemedicine -     Hyperlipidemia     Hypertension     Knee pain, bilateral     Especially right    Medical marijuana use     Memory loss     Migraines     Obesity     Overactive bladder     Sjogren's disease ([address] )     Sleep apnea     Stress incontinence     Suicidal ideations     Teeth missing     Tremor     per pt Tremors of Right Leg and both Arms    Visual impairment     Wears glasses        Past Surgical History:   Procedure Laterality Date    BREAST CYST EXCISION Right 1989    CARPAL TUNNEL RELEASE Left     CHOLECYSTECTOMY  05/2003    Laparoscopic    COLONOSCOPY      01/12/2009    DILATION AND CURETTAGE OF UTERUS      ELBOW SURGERY Left     x2   No hardware    ESOPHAGOGASTRODUODENOSCOPY      FOOT SURGERY Left     Plantar fasciotomy    HERNIA REPAIR      HYSTERECTOMY      laporoscopic, partial    KNEE ARTHROSCOPY Bilateral     OOPHORECTOMY Left 10/2015    NJ GASTROCNEMIUS RECESSION Left 02/24/2021    Procedure: RECESSION GASTROC OPEN;  Surgeon: Arielle Ward DPM;  Location: [address] [place] [address] OR;  Service: Podiatry    NJ RPR UMBILICAL HRNA 5 YRS/> REDUCIBLE N/A 04/24/2019    Procedure: REPAIR HERNIA UMBILICAL LAPAROSCOPIC W/ ROBOTICS;  Surgeon: Amaury Valente MD;  Location: [facility] OR;  Service: General    NJ SPHINCTEROTOMY ANAL DIVISION SPHINCTER [facility] N/A 08/31/2018    Procedure: EUA, LEFT PARTIAL INTERNAL SPHINCTEROTOMY;  Surgeon: Allison Mandel MD;  Location: [address] [place] [address] MAIN OR;  Service: Colorectal    NJ TNOT ELBOW LATERAL/MEDIAL DEBRIDE OPEN TDN RPR Left 09/20/2022    Procedure: RELEASE EPICONDYLAR ELBOW MEDIAL;  Surgeon: Killian Pratt MD;  [facility]; Service: Orthopedics    REDUCTION MAMMAPLASTY Bilateral 1999    REPAIR LACERATION Left     left hand-2009    REPLACEMENT TOTAL KNEE Right     ROTATOR CUFF REPAIR Left     TONSILECTOMY AND ADNOIDECTOMY      US GUIDED MSK PROCEDURE  2021    VEIN LIGATION Bilateral     WISDOM TOOTH EXTRACTION         Family History   Problem Relation Age of Onset    Kidney cancer Mother     Diabetes Mother     Depression Mother     Stroke Mother     Arthritis Mother     Cancer Mother     Psychiatric Illness Mother     No Known Problems Father     No Known Problems Sister     No Known Problems Maternal Grandmother     No Known Problems Maternal Grandfather     No Known Problems Paternal Grandmother     No Known Problems Paternal Grandfather     Colon cancer Maternal Uncle     Colon cancer Maternal Uncle     Colon cancer Paternal Uncle     Colon cancer Family     Alcohol abuse Sister     Asthma Sister      I have reviewed and agree with the history as documented  E-Cigarette/Vaping    E-Cigarette Use Current Some Day User     Comments medical THC      E-Cigarette/Vaping Substances    Nicotine No     THC Yes     CBD No     Flavoring No     Other No     Unknown No      Social History     Tobacco Use    Smoking status: Former     Packs/day: 2 00     Years: 30 00     Pack years: 60 00     Types: Cigarettes     Start date: 5     Quit date: 2018     Years since quittin 3    Smokeless tobacco: Never    Tobacco comments:     Started at age 14  Vaping Use    Vaping Use: Some days    Substances: THC   Substance Use Topics    Alcohol use: Not Currently     Comment: Recovering alcoholic    Drug use: Not Currently     Types: Marijuana, Methamphetamines     Comment: Medical marijuana       Review of Systems   Constitutional: Negative for appetite change, chills, fatigue and fever  HENT: Negative for congestion, rhinorrhea, sinus pressure, sinus pain and sore throat      Eyes: Negative for photophobia and visual disturbance  Respiratory: Negative for cough, shortness of breath and wheezing  Cardiovascular: Negative for chest pain, palpitations and leg swelling  Gastrointestinal: Negative for abdominal pain, blood in stool, constipation, diarrhea, nausea and vomiting  Genitourinary: Negative for difficulty urinating, dysuria, flank pain, frequency, hematuria and urgency  Musculoskeletal: Positive for arthralgias (right knee pain)  Negative for back pain, joint swelling, myalgias and neck pain  Neurological: Negative for dizziness, syncope, weakness, light-headedness and headaches  All other systems reviewed and are negative  Physical Exam  Physical Exam  Vitals and nursing note reviewed  Constitutional:       General: She is awake  Appearance: Normal appearance  She is well-developed  She is not toxic-appearing or diaphoretic  HENT:      Head: Normocephalic and atraumatic  Right Ear: External ear normal       Left Ear: External ear normal       Nose: Nose normal       Mouth/Throat:      Lips: Pink  Mouth: Mucous membranes are moist    Eyes:      General: Lids are normal  No scleral icterus  Conjunctiva/sclera: Conjunctivae normal       Pupils: Pupils are equal, round, and reactive to light  Cardiovascular:      Rate and Rhythm: Normal rate and regular rhythm  Pulses: Normal pulses  Radial pulses are 2+ on the right side and 2+ on the left side  Heart sounds: Normal heart sounds, S1 normal and S2 normal    Pulmonary:      Effort: Pulmonary effort is normal  No accessory muscle usage  Breath sounds: Normal breath sounds  No stridor  No decreased breath sounds, wheezing, rhonchi or rales  Abdominal:      General: Abdomen is flat  Bowel sounds are normal  There is no distension  Palpations: Abdomen is soft  Tenderness: There is no abdominal tenderness   There is no right CVA tenderness, left CVA tenderness, guarding or rebound  Musculoskeletal:      Cervical back: Full passive range of motion without pain and neck supple  No signs of trauma  No pain with movement  Right lower leg: No edema  Left lower leg: No edema  Comments: Right knee:  Tenderness primarily over the posterolateral aspect of the distal thigh/hamstring  There is also tenderness over the lateral aspect of the joint and popliteal fossa  Very mild soft tissue swelling compared to left  No overlying erythema/warmth  No appreciable joint effusion or joint laxity  Active ROM slightly decreased secondary to pain  Increased pain with flexion  Normal passive ROM  PT/DP pulses 2+  Sensation intact  Cap refill <2 seconds  Incision is well-healed with no evidence of infection  Lymphadenopathy:      Cervical: No cervical adenopathy  Skin:     General: Skin is warm and dry  Capillary Refill: Capillary refill takes less than 2 seconds  Coloration: Skin is not cyanotic, jaundiced or pale  Neurological:      Mental Status: She is alert and oriented to person, place, and time  GCS: GCS eye subscore is 4  GCS verbal subscore is 5  GCS motor subscore is 6  Gait: Gait normal    Psychiatric:         Mood and Affect: Mood normal          Speech: Speech normal          Behavior: Behavior is cooperative           Vital Signs  ED Triage Vitals [05/05/23 1107]   Temperature Pulse Respirations Blood Pressure SpO2   98 2 °F (36 8 °C) 77 16 151/81 95 %      Temp Source Heart Rate Source Patient Position - Orthostatic VS BP Location FiO2 (%)   Oral Monitor Sitting Right arm --      Pain Score       9           Vitals:    05/05/23 1107   BP: 151/81   Pulse: 77   Patient Position - Orthostatic VS: Sitting         Visual Acuity      ED Medications  Medications   ketorolac (TORADOL) injection 15 mg (15 mg Intramuscular Given 5/5/23 1214)   acetaminophen (TYLENOL) tablet 975 mg (975 mg Oral Given 5/5/23 1214)       Diagnostic Studies  Results Reviewed     None                 VAS lower limb venous duplex study, unilateral/limited   Final Result by Negrita Archuleta MD (05/05 9940)      XR knee 4+ views Right injury   Final Result by Crystal Arteaga MD (05/05 6345)      Unremarkable appearance of total knee arthroplasty  Workstation performed: [id number]                    Procedures  Procedures         ED Course  ED Course as of 05/06/23 1018   Fri May 05, 2023   1130 Venous duplex negative for DVT  Medical Decision Making  Patient is a 46 y/o female with a PMHx of arthritis, asthma, COPD, GERD, HLD, HTN and sleep apnea, presenting to the ED for evaluation of right knee pain x1 month  Given pain over posterior thigh/popliteal karla, a duplex was obtained to rule out DVT and is negative  X-ray unremarkable  Patient has pain primarily over the right posterior thigh/hamstring muscles, suspect hamstring muscle sprain/strain given sudden pain when stepping out of car  Exam is not consistent with a septic joint  Patient was encouraged to rest, ice and elevated the extremity and take motrin and/or tylenol as needed for pain  She has been having ongoing issues with stiffness and pain in the right knee for the past month and will need close follow-up with her orthopedic surgeon for continued management as she may require further imaging such as MRI or physical therapy if symptoms persist    The management plan was discussed in detail with the patient at bedside and all questions were answered  Strict ED return instructions were discussed at bedside  Prior to discharge, both verbal and written instructions were provided  We discussed the signs and symptoms that should prompt the patient to return to the ED  All questions were answered and the patient was comfortable with the plan of care and discharged home   The patient agrees to return to the Emergency Department for concerns and/or progression of illness  Amount and/or Complexity of Data Reviewed  Radiology: ordered  Risk  OTC drugs  Prescription drug management  Disposition  Final diagnoses:   Right knee pain   Strain of right hamstring muscle, initial encounter     Time reflects when diagnosis was documented in both MDM as applicable and the Disposition within this note     Time User Action Codes Description Comment    5/5/2023  1:51 PM Steph Rivero Add [M25 561] Right knee pain     5/5/2023  1:51 PM Steph Rivero Add [E63 109J] Strain of right hamstring muscle, initial encounter       ED Disposition     ED Disposition   Discharge    Condition   Stable    Date/Time   Fri May 5, 2023  1:51 PM    Comment   Zaid Kern [facility] discharge to home/self care                 Follow-up Information     Follow up With Specialties Details Why Contact Info Additional Doron Hernandez,  Orthopedic Surgery Schedule an appointment as soon as possible for a visit  Re-evaluation, If symptoms worsen [address] [facility]  [address] [facility] Emergency Department Emergency Medicine  If symptoms worsen [address] [facility] [address] [facility] Emergency Department, [address], [place], [place], [facility]          Discharge Medication List as of 5/5/2023  1:54 PM      START taking these medications    Details   Diclofenac Sodium (VOLTAREN) 1 % Apply 2 g topically 3 (three) times a day as needed (pain) For pain to affected area, Starting Fri 5/5/2023, Normal         CONTINUE these medications which have NOT CHANGED    Details   albuterol (Ventolin HFA) 90 mcg/act inhaler Inhale 2 puffs every 6 (six) hours as needed for wheezing, Starting Mon 6/20/2022, Normal      amLODIPine (NORVASC) 5 mg tablet TAKE 1 TABLET (5 MG TOTAL) BY MOUTH DAILY, Starting Wed 1/25/2023, Normal      buPROPion (WELLBUTRIN XL) 300 mg 24 hr tablet Take 300 mg by mouth daily, Starting Fri 9/16/2022, Historical Med      carBAMazepine (CARBATROL) 300 MG 12 hr capsule Take 1 capsule by mouth in the morning, Starting Wed 11/23/2022, Historical Med      Cholecalciferol (Vitamin D3) 125 MCG (5000 UT) CAPS Take by mouth in the morning, Starting Wed 5/25/2022, Historical Med      colestipol (COLESTID) 1 g tablet Take 1 tablet (1 g total) by mouth 2 (two) times a day, Starting Sat 7/16/2022, Until Thu 12/15/2022, No Print      !! divalproex sodium (DEPAKOTE ER) 250 mg 24 hr tablet Starting Mon 9/26/2022, Historical Med      !! divalproex sodium (DEPAKOTE ER) 500 mg 24 hr tablet 500 mg 2 (two) times a day, Starting Wed 9/14/2022, Historical Med      divalproex sodium (DEPAKOTE) 250 mg EC tablet Take 3 tablets (750 mg total) by mouth every 12 (twelve) hours, Starting Mon 7/25/2022, Until Thu 12/15/2022, Normal      estradiol (ESTRACE) 0 5 MG tablet Take 1 tablet (0 5 mg total) by mouth daily, Starting Thu 5/4/2023, Normal      Fesoterodine Fumarate ER (Toviaz) 4 MG TB24 Take 1 tablet by mouth daily, Historical Med      fluticasone (FLOVENT HFA) 110 MCG/ACT inhaler Inhale 2 puffs as needed Rinse mouth after use , Historical Med      furosemide (LASIX) 20 mg tablet TAKE 1 TABLET BY MOUTH DAILY, Normal      haloperidol decanoate (Haldol Decanoate) 50 mg/mL injection every 30 (thirty) days, Starting Wed 8/3/2022, Historical Med      !! hydrocortisone 2 5 % ointment Apply topically 2 (two) times a day, Starting Tue 10/25/2022, Normal      !! hydrocortisone 2 5 % ointment Apply topically 2 (two) times a day, Starting Tue 3/28/2023, Normal      hydrOXYzine HCL (ATARAX) 25 mg tablet Take 1 tablet (25 mg total) by mouth every 6 (six) hours as needed for itching (mild anxiety), Starting Tue 7/26/2022, Until Wed 12/28/2022 at 2359, Normal      levothyroxine (Euthyrox) 125 mcg tablet Take 1 tablet (125 mcg total) by mouth daily in the early morning, Starting Thu 3/16/2023, Normal      losartan (COZAAR) 50 mg tablet TAKE ONE TABLET BY MOUTH DAILY, Normal      Lurasidone HCl 60 MG TABS Take 1 tablet (60 mg total) by mouth 2 (two) times a day TAKEN IN THE MORNING AND AT NIGHT-----Latuda, Starting Thu 1/26/2023, Until Sat 2/25/2023, No Print      methocarbamol (ROBAXIN) 500 mg tablet Take 1 tablet (500 mg total) by mouth 3 (three) times a day, Starting Thu 3/9/2023, Normal      metoprolol tartrate (LOPRESSOR) 25 mg tablet TAKE ONE TABLET BY MOUTH EVERY 12 HOURS, Normal      mometasone (ELOCON) 0 1 % lotion Apply topically daily, Starting Thu 2/23/2023, Normal      phenazopyridine (PYRIDIUM) 100 mg tablet Take 1 tablet (100 mg total) by mouth 3 (three) times a day as needed for bladder spasms, Starting Thu 3/23/2023, Normal      pilocarpine (SALAGEN) 5 mg tablet Take 5 mg by mouth 2 (two) times a day, Historical Med      RABEprazole (ACIPHEX) 20 MG tablet Take 20 mg by mouth 2 (two) times a day, Historical Med      !! rOPINIRole (REQUIP) 0 5 mg tablet Take by mouth daily at bedtime, Starting Wed 5/25/2022, Historical Med      !! rOPINIRole (REQUIP) 1 mg tablet Starting Mon 9/26/2022, Historical Med      !! topiramate (TOPAMAX) 200 MG tablet Starting Wed 1/18/2023, Historical Med      !! topiramate (TOPAMAX) 50 MG tablet Take 50 mg by mouth 2 (two) times a day, Starting Wed 10/12/2022, Historical Med      trospium chloride (SANCTURA) 20 mg tablet Take 20 mg by mouth 2 (two) times a day, Starting Thu 1/12/2023, Until Fri 1/12/2024, Historical Med      venlafaxine (EFFEXOR) 37 5 mg tablet Take 37 5 mg by mouth daily, Starting Wed 10/19/2022, Historical Med       !! - Potential duplicate medications found  Please discuss with provider  No discharge procedures on file      PDMP Review       Value Time User    PDMP Reviewed  Yes 9/20/2022 10:10 AM Irma Mayen PA-C          ED Provider  Electronically Signed by           Wallace Lara PA-C  05/06/23 1018

## 2023-05-10 ENCOUNTER — OFFICE VISIT (OUTPATIENT)
Dept: PHYSICAL THERAPY | Age: 53
End: 2023-05-10

## 2023-05-10 DIAGNOSIS — M54.2 CERVICALGIA: ICD-10-CM

## 2023-05-10 DIAGNOSIS — M25.551 RIGHT HIP PAIN: Primary | ICD-10-CM

## 2023-05-10 NOTE — PROGRESS NOTES
Daily Note     Today's date: 5/10/2023  Patient name: Nallely Lopez  : 1970  MRN: [id number]  Referring provider: JHONATAN Hearn  Dx:   Encounter Diagnosis     ICD-10-CM    1  Right hip pain  M25 551       2  Cervicalgia  M54 2                      Subjective: Patient reports that she was in the ED yesterday because she couldn't flex the knee after she got in the car  Patient feeling very sore in the knee today, wanting to not do leg exercises today in fear of aggravating the knee  Objective: See treatment diary below      Assessment: Tolerated treatment well  Patient demonstrated fatigue post treatment, exhibited good technique with therapeutic exercises and would benefit from continued PT Continued with manual work only for the cervical spine as patient has been having increased stress at home and with knee pain, etc  Patient tight in cervical spine, tolerating manual work well today  Plan: Continue per plan of care        Precautions: psych history  Supine with wedge    Manuals 4/12 4/19 5/3 5/10 3/29    Cervical SOR, manual distraction x 5'    Cervical SOR, manual distraction x 5' Cervical SOR, manual distraction x 5' x5' x5'    L UT stretch 3x:20  L UT stretch 3x:20  L UT stretch 3x:20 3x:20 5x:20    L levator stretch 3x:20 L levator stretch 3x:20 L levator stretch 3x:20 3x:20 5x:20           Neuro Re-Ed                sidestepping   -     Step ups FW, lateral   -     squats   -                             Ther Ex                Nu step x10' x10' - - x10' L1           IT band stretch 3x:20 3x:20 - - 5x:20   Ham stretch 5x:20 3x:20 - -    Standing hip flexor stretch                bridges 2x10 2x10 - - 2x10   SAQ 3# 2x10 3# 2x10 - - 3# 2x10   SLR flex Held back pain 10x - - 2x10   LAQ - held knee pain 2x10   2x10    Supine DLS marches 2# G79 Supine DLS march 2# L16      SLR hip abd, stand -  - -    Ham curls -  - -    HR -  - -                            Ther Activity Gait Training                        Modalities        MH vs CP PRN Declined today  MH cervical 10' seated NT NT           Progress as able

## 2023-05-17 ENCOUNTER — OFFICE VISIT (OUTPATIENT)
Dept: PHYSICAL THERAPY | Age: 53
End: 2023-05-17

## 2023-05-17 DIAGNOSIS — M54.2 CERVICALGIA: ICD-10-CM

## 2023-05-17 DIAGNOSIS — M25.551 RIGHT HIP PAIN: Primary | ICD-10-CM

## 2023-05-17 NOTE — PROGRESS NOTES
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, having a hard time with call outs  Patient reports that her knee and back still bother her, wants to focus on the neck only  Neck pain still stiff and painful  Patient plans to change her posture to reduce her neck pain, thinks its from her poor sitting posture as well  Objective: See treatment diary below      Assessment: Tolerated treatment well  Patient demonstrated fatigue post treatment, exhibited good technique with therapeutic exercises and would benefit from continued PT   Discussed patient's concerns, recommended her to stay in touch with her ACT team as well as patient plans to call the county to discuss further  Patient does present with increased tightness at L sided cervical spine  Responding well to TE  Plan: Continue per plan of care        Precautions: psych history  Supine with wedge    Manuals 4/12 4/19 5/3 5/10 5/17    Cervical SOR, manual distraction x 5'    Cervical SOR, manual distraction x 5' Cervical SOR, manual distraction x 5' x5' x5'    L UT stretch 3x:20  L UT stretch 3x:20  L UT stretch 3x:20 3x:20 5x:20    L levator stretch 3x:20 L levator stretch 3x:20 L levator stretch 3x:20 3x:20 5x:20           Neuro Re-Ed                sidestepping   -     Step ups FW, lateral   -     squats   -                             Ther Ex             NT --- TE below   Nu step x10' x10' - - x10' L1           IT band stretch 3x:20 3x:20 - - 5x:20   Ham stretch 5x:20 3x:20 - -    Standing hip flexor stretch                bridges 2x10 2x10 - - 2x10   SAQ 3# 2x10 3# 2x10 - - 3# 2x10   SLR flex Held back pain 10x - - 2x10   LAQ - held knee pain 2x10   2x10    Supine DLS marches 2# U33 Supine DLS marches 2# T46      SLR hip abd, stand -  - -    Ham curls -  - -    HR -  - -                            Ther Activity                        Gait Training                        Modalities        MH vs CP PRN Declined today  MH cervical 10' seated NT NT           Progress as able

## 2023-05-24 ENCOUNTER — APPOINTMENT (OUTPATIENT)
Dept: PHYSICAL THERAPY | Age: 53
End: 2023-05-24
Payer: MEDICARE

## 2023-05-24 DIAGNOSIS — I10 HYPERTENSION, UNSPECIFIED TYPE: ICD-10-CM

## 2023-05-24 RX ORDER — FUROSEMIDE 20 MG/1
TABLET ORAL
Qty: 30 TABLET | Refills: 2 | Status: SHIPPED | OUTPATIENT
Start: 2023-05-24

## 2023-05-31 ENCOUNTER — TELEPHONE (OUTPATIENT)
Dept: NEUROLOGY | Facility: CLINIC | Age: 53
End: 2023-05-31

## 2023-05-31 ENCOUNTER — OFFICE VISIT (OUTPATIENT)
Dept: PHYSICAL THERAPY | Age: 53
End: 2023-05-31

## 2023-05-31 ENCOUNTER — TELEMEDICINE (OUTPATIENT)
Dept: NEUROLOGY | Facility: CLINIC | Age: 53
End: 2023-05-31

## 2023-05-31 DIAGNOSIS — G43.709 CHRONIC MIGRAINE WITHOUT AURA WITHOUT STATUS MIGRAINOSUS, NOT INTRACTABLE: Primary | ICD-10-CM

## 2023-05-31 DIAGNOSIS — M54.2 CERVICALGIA: ICD-10-CM

## 2023-05-31 DIAGNOSIS — I10 BENIGN ESSENTIAL HYPERTENSION: Chronic | ICD-10-CM

## 2023-05-31 DIAGNOSIS — M54.2 NECK PAIN: ICD-10-CM

## 2023-05-31 DIAGNOSIS — M54.2 CERVICALGIA: Primary | ICD-10-CM

## 2023-05-31 DIAGNOSIS — M25.551 RIGHT HIP PAIN: Primary | ICD-10-CM

## 2023-05-31 DIAGNOSIS — G47.33 OSA (OBSTRUCTIVE SLEEP APNEA): Chronic | ICD-10-CM

## 2023-05-31 RX ORDER — METHOCARBAMOL 500 MG/1
500 TABLET, FILM COATED ORAL 3 TIMES DAILY PRN
Qty: 45 TABLET | Refills: 2 | Status: SHIPPED | OUTPATIENT
Start: 2023-05-31
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
## 2023-05-31 NOTE — PROGRESS NOTES
PT Discharge    Today's date: 2023  Patient name: Odilon Ortiz  : 1970  MRN: [id number]  Referring provider: JHONATAN Us  Dx:   Encounter Diagnosis     ICD-10-CM    1  Right hip pain  M25 551       2  Cervicalgia  M54 2                      Assessment  Assessment details: Patient seen for PT discharge assessment  Patient still presents with L sided neck pain, does understand and will be working on her posture at home, will try laying in her bed vs the recliner to sleep  PT recommending patient to trial her exercises at home, stretch her neck to continue with alleviating her sx  No significant reduction in neck pain with PT  Patient has not been able to tolerate therapy for hip and knees in ~ 3-4 weeks, has been following up with ortho for her knee pain  Patient to be discharged to [facility]  Impairments: abnormal or restricted ROM, activity intolerance, impaired physical strength, lacks appropriate home exercise program, pain with function, poor posture  and poor body mechanics  Functional limitations: Patient reports hip pain with twisting, standing, walking  Neck pain with reading, watching TVUnderstanding of Dx/Px/POC: good   Prognosis: good    Goals  Impairment Goals to be met within 4 weeks  - Decrease pain by 25% with activity PARTIALLY MET  - Improve ROM to [facility] hip ER PARTIALLY MET  - Increase strength by 1/2 MMT throughout PARTIALLY MET       Functional Goals to be met within 4-6 weeks  - Patient will be independent with HEP MET  - Reduce hip pain with twisting and turning    PARTIALLY MET    Goals added:   - Decrease cervical pain to 3/10 at rest and with reading NOT MET  - Improve postural strength progressing  - Patient to be I with HEP MET        Plan  Patient would benefit from: skilled physical therapy  Planned modality interventions: cryotherapy and thermotherapy: hydrocollator packs  Planned therapy interventions: abdominal trunk stabilization, joint mobilization, "manual therapy, neuromuscular re-education, patient education, postural training, strengthening, stretching, therapeutic activities, therapeutic exercise, home exercise program, gait training, body mechanics training and balance  Frequency: 1x week  Duration in weeks: 4  Treatment plan discussed with: family        Subjective Evaluation    History of Present Illness  Mechanism of injury: Patient reports that her neck is still bothering her, still has a \"kink\" in her neck, has not changed or reduced with therapy  Patient does feel better in the moment, but has no lasting relief with therapy  Pain  Current pain rating: 3  At best pain rating: 3  At worst pain ratin  Location: R hip ; cervical spine 5/10 currently 8/10 at worst  Quality: dull ache and sharp  Aggravating factors: sitting, standing, walking and stair climbing  Progression: no change    Social Support    Employment status: working    Diagnostic Tests  X-ray: normal  Treatments  Previous treatment: occupational therapy  Patient Goals  Patient goals for therapy: decreased pain, increased motion, increased strength and independence with ADLs/IADLs          Objective     Concurrent Complaints  Positive for disturbed sleep and headaches  Negative for night pain, dizziness, faints, nausea/motion sickness, tinnitus, trouble swallowing, difficulty breathing, shortness of breath, respiratory pain and visual change    Additional Special Questions  H/o migraines, does have botox injections for migraines- feels the botox has been managing her migraines       Patient with a constant headache at the L side of her cervical spine, will radiate up to the L lateral head    Postural Observations  Seated posture: fair        Neurological Testing     Sensation   Cervical/Thoracic   Left   Intact: light touch    Right   Intact: light touch    Active Range of Motion   Cervical/Thoracic Spine       Cervical    Flexion: 50 degrees   Extension: 37 degrees      Left lateral " flexion: 17 degrees     with pain  Right lateral flexion: 20 degrees     with pain  Left rotation: 62 degrees  Right rotation: 63 degrees         Left Shoulder   Flexion: WFL    Right Shoulder   Flexion: WFL  Left Hip   Normal active range of motion  Flexion: with pain  External rotation (90/90): with pain    Right Hip   Normal active range of motion  Left Knee   Normal active range of motion    Right Knee   Normal active range of motion  Left Ankle/Foot   Normal active range of motion    Right Ankle/Foot   Normal active range of motion    Additional Active Range of Motion Details  L hip pain with flexion and limited with ER on L compared to R    Strength/Myotome Testing     Left Shoulder     Planes of Motion   Flexion: 4   Abduction: 4     Right Shoulder     Planes of Motion   Flexion: 4   Abduction: 4     Left Hip   Planes of Motion   Flexion: 4  Extension: 4-  Abduction: 4  Adduction: 4    Right Hip   Planes of Motion   Flexion: 4  Extension: 4-  Abduction: 4  Adduction: 4    Left Knee   Flexion: 4  Extension: 4    Right Knee   Flexion: 4  Extension: 4    Left Ankle/Foot   Dorsiflexion: 5  Plantar flexion: 5    Right Ankle/Foot   Dorsiflexion: 5  Plantar flexion: 5  Neuro Exam:     Headaches   Patient reports headaches: Yes                Precautions: psych history  Supine with wedge    Manuals 5/31 4/19 5/3 5/10 5/17    Cervical SOR, manual distraction x 5'    Cervical SOR, manual distraction x 5' Cervical SOR, manual distraction x 5' x5' x5'    L UT stretch 5x:20  L UT stretch 3x:20  L UT stretch 3x:20 3x:20 5x:20    L levator stretch 5x:20 L levator stretch 3x:20 L levator stretch 3x:20 3x:20 5x:20           Neuro Re-Ed No TE below               sidestepping   -     Step ups FW, lateral   -     squats   -                             Ther Ex             NT --- TE below   Nu step  x10' - - x10' L1           IT band stretch  3x:20 - - 5x:20   Ham stretch  3x:20 - -    Standing hip flexor stretch bridges  2x10 - - 2x10   SAQ  3# 2x10 - - 3# 2x10   SLR flex  10x - - 2x10   LAQ  2x10   2x10     Supine DLS marches 2# H46      SLR hip abd, stand   - -    Ham curls   - -    HR   - -                            Ther Activity                        Gait Training                        Modalities        MH vs CP PRN x10' cervical spine  MH cervical 10' seated NT NT           Progress as able

## 2023-05-31 NOTE — TELEPHONE ENCOUNTER
Received VM Transcription:    Good afternoon  Pharmacist at [address] [place] [address]  We have a prescription for Ms Mercer Phalen, date of birth is [birth year]  She had a prescription for a Methocarbamol 500 mg  The insurance  did not pay for it  But however, if the doctor would like to change it, they do pay for Tizanidine, or Cyclobenzaprine  Any questions? Give us a call back at [phone]  Thank you so much  Nilesh Bhatt, please advise  Thank you!

## 2023-05-31 NOTE — PATIENT INSTRUCTIONS
Preventative:  Continue Botox every 3 months  Continue all medications from other providers    May use methocarbamol 500 mg every 8 hours as needed for your neck pain    Abortive:  Please call us if you are unable to break your headaches or would like to have another medication as a rescue
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
## 2023-05-31 NOTE — ASSESSMENT & PLAN NOTE
Recommend continuing following with sleep medicine as directed    We discussed that this is imperative for her health

## 2023-06-01 DIAGNOSIS — M54.2 CERVICALGIA: Primary | ICD-10-CM

## 2023-06-01 RX ORDER — CYCLOBENZAPRINE HCL 10 MG
10 TABLET ORAL
Qty: 90 TABLET | Refills: 0 | Status: SHIPPED | OUTPATIENT
Start: 2023-06-01

## 2023-06-07 ENCOUNTER — EVALUATION (OUTPATIENT)
Dept: PHYSICAL THERAPY | Facility: CLINIC | Age: 53
End: 2023-06-07
Payer: MEDICARE

## 2023-06-07 VITALS
TEMPERATURE: 97.9 F | DIASTOLIC BLOOD PRESSURE: 90 MMHG | HEART RATE: 96 BPM | OXYGEN SATURATION: 96 % | SYSTOLIC BLOOD PRESSURE: 138 MMHG

## 2023-06-07 DIAGNOSIS — M54.2 CERVICALGIA: ICD-10-CM

## 2023-06-07 DIAGNOSIS — M54.2 CHRONIC NECK PAIN: Primary | ICD-10-CM

## 2023-06-07 DIAGNOSIS — G89.29 CHRONIC NECK PAIN: Primary | ICD-10-CM

## 2023-06-07 PROCEDURE — 97112 NEUROMUSCULAR REEDUCATION: CPT | Performed by: PHYSICAL THERAPIST

## 2023-06-07 PROCEDURE — 97161 PT EVAL LOW COMPLEX 20 MIN: CPT | Performed by: PHYSICAL THERAPIST

## 2023-06-07 NOTE — PROGRESS NOTES
PT Evaluation     Today's date: 2023  Patient name: Darwin Pina  : 1970  MRN: [id number]  Referring provider: JHONATAN Bradshaw  Dx:   Encounter Diagnosis     ICD-10-CM    1  Chronic neck pain  M54 2     G89 29       2  Cervicalgia  M54 2 Ambulatory Referral to [facility] PT                     Assessment  Assessment details: Darwin Pina is a 47 y o  female who presents with cervical mobility deficits due to poor posture which is resulting in altered kinematics with rotation  Due to these impairments, the patient has difficulty performing activities such as doing her gem art, looking over her shoulder, or any other activities requiring cervical rotation  Patient would benefit from skilled physical therapy to address the impairments, improve their level of function, and to improve their overall quality of life  Impairments: abnormal or restricted ROM, abnormal movement, activity intolerance, lacks appropriate home exercise program, pain with function and poor posture   Prognosis details: Positive prognostic indicators include: absence of observed red flags, positive attitude towards recovery, good understanding of diagnosis and treatment plan   Negative prognostic indicators include: PMhx      Goals  Short Term Goals: to be achieved by 4 weeks  1) Patient to be independent with basic HEP  2) Decrease pain to 2/10 at its worst   3) Increase cervical spine rotation ROM by 5-10 degrees in all deficient planes  4) Increase UE strength by 1/2 MMT grade in all deficient planes  5) Improve joint mobility in cervical spine to normal     Long Term Goals: to be achieved by discharge  1) FOTO equal to or greater than expected  2) Patient to be independent with comprehensive HEP  3) Cervical spine ROM WNL all planes to improve a/iadls        Plan  Patient would benefit from: PT eval and skilled physical therapy  Planned modality interventions: low level laser therapy  Planned therapy interventions: manual therapy, neuromuscular re-education, patient education, self care, therapeutic activities, therapeutic exercise, activity modification and home exercise program  Frequency: 2x/week for 4-6 weeks  Treatment plan discussed with: patient        Subjective Evaluation    History of Present Illness  Mechanism of injury: History: Pt presents with L sided neck pain  She was previously receiving PT but reports that it did not help  She has hx of migraines which she feels like is being managed with botox injections  She also sees a chiropractor and in April she got her neck cracked which increased her neck pain  Has hx of elbow and nerve issues but nothing new since cervical pain started  She notes having bad posture at home and tends to have fwd posture  Denies any sleeping issues due to the neck  Aggravating: L rotation and L SB   Alleviating: TENS, massage   24 hours: worse at night   Functional limitations: can do all ADLs but feels discomfort with it   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   Imaging: none  Red flags: Angie Arredondo denies a new onset of Bladder incontinence, Bowel dysfunction, Dizziness, Dysphagia, Drop attacks, Diplopia, Clumsy or unsteadiness, Tingling, Numbness and Saddle anesthesia       Pain  Current pain rating: 3  At best pain rating: 3  At worst pain ratin  Quality: dull ache          Objective     Postural Observations  Seated posture: poor  Standing posture: poor  Correction of posture: makes symptoms better        Neurological Testing     Sensation   Cervical/Thoracic   Left   Intact: light touch    Right   Intact: light touch    Active Range of Motion   Cervical/Thoracic Spine       Cervical    Flexion: 55 degrees   Extension: 35 degrees      Left lateral flexion: 20 degrees     with pain  Right lateral flexion: 20 degrees     with pain  Left rotation: 55 degrees with pain  Right rotation: 45 degrees             Joint Play Pain: C3, C4 and C5     Strength/Myotome Testing     Left Shoulder     Planes of Motion   Flexion: 4+   Abduction: 4+   External rotation at 0°: 5     Right Shoulder     Planes of Motion   Flexion: 4+   Abduction: 4+   External rotation at 0°: 5     Left Elbow   Flexion: 5  Extension: 5    Right Elbow   Flexion: 5  Extension: 5    Left Wrist/Hand   Wrist extension: 5  Wrist flexion: 5    Right Wrist/Hand   Wrist extension: 5  Wrist flexion: 5             Precautions: chronic migraines, s/p R TKA 2/20222, tremors       Manuals 6/7            Cervical side glides              Cervical upglides             Suboccipital stretch                           Neuro Re-Ed             TB Row HEP RTB             TB straight arm pull down  HEP RTB             TB B ER  HEP RTB            Bent over horiz abd              Seated lat pull down              Serratus wall clock             Prone I              Prone W                           Ther Ex             UBE              SNAG                                                                                           Ther Activity                                       Pt Edu              Posture, sitting position for TV, phone, and gem art  ANKUSH                          Modalities

## 2023-06-14 ENCOUNTER — APPOINTMENT (OUTPATIENT)
Dept: PHYSICAL THERAPY | Facility: CLINIC | Age: 53
End: 2023-06-14
Payer: MEDICARE

## 2023-06-15 ENCOUNTER — OFFICE VISIT (OUTPATIENT)
Dept: PHYSICAL THERAPY | Facility: CLINIC | Age: 53
End: 2023-06-15
Payer: MEDICARE

## 2023-06-15 ENCOUNTER — RA CDI HCC (OUTPATIENT)
Dept: OTHER | Facility: HOSPITAL | Age: 53
End: 2023-06-15

## 2023-06-15 DIAGNOSIS — M54.2 CERVICALGIA: ICD-10-CM

## 2023-06-15 DIAGNOSIS — G89.29 CHRONIC NECK PAIN: Primary | ICD-10-CM

## 2023-06-15 DIAGNOSIS — M54.2 CHRONIC NECK PAIN: Primary | ICD-10-CM

## 2023-06-15 PROCEDURE — 97140 MANUAL THERAPY 1/> REGIONS: CPT | Performed by: PHYSICAL THERAPIST

## 2023-06-15 PROCEDURE — 97110 THERAPEUTIC EXERCISES: CPT | Performed by: PHYSICAL THERAPIST

## 2023-06-15 PROCEDURE — 97112 NEUROMUSCULAR REEDUCATION: CPT | Performed by: PHYSICAL THERAPIST

## 2023-06-15 NOTE — PROGRESS NOTES
"Daily Note     Today's date: 6/15/2023  Patient name: Darwin Pina  : 1970  MRN: [id number]  Referring provider: JHONATAN Bradshaw  Dx:   Encounter Diagnosis     ICD-10-CM    1  Chronic neck pain  M54 2     G89 29       2  Cervicalgia  M54 2                      Subjective: Pt reports having a busy week and tried to do her HEP a few times       Objective: See treatment diary below      Assessment: Patient did well with treatment today  Focused on periscap exercises for postural control which patient found to be helpful and decreased symptoms compared to when she walked in to treatment  Pt to continue with current HEP and being mindful of posture at home  Plan: Continue per plan of care        Precautions: chronic migraines, s/p R TKA , tremors     1:1 with -8892  Manuals 6/7 6/15           Cervical side glides   Lower Cspine GII-III           Cervical upglides             Suboccipital stretch   ANKUSH                        Neuro Re-Ed             TB Row HEP RTB  RTB 3x10           TB straight arm pull down  HEP RTB  RTB 3x10           TB B ER  HEP RTB RTB 3x10           Bent over horiz abd              Seated lat pull down              Serratus wall clock             Prone I              Prone W              Bent over row   3# 3x10 ea side            Bent over straight arm extension   3# 3x10 ea side                        Ther Ex             UBE   3/3           SNAG             Thoracic extension over bolster on table   10x5\"           Pec stretch at door   4x15\"                                                               Ther Activity                                       Pt Edu              Posture, sitting position for TV, phone, and gem art  ANKUSH                          Modalities                                            "

## 2023-06-20 ENCOUNTER — TELEPHONE (OUTPATIENT)
Dept: NEUROLOGY | Facility: CLINIC | Age: 53
End: 2023-06-20

## 2023-06-21 ENCOUNTER — APPOINTMENT (OUTPATIENT)
Dept: PHYSICAL THERAPY | Facility: CLINIC | Age: 53
End: 2023-06-21
Payer: MEDICARE

## 2023-06-21 DIAGNOSIS — I10 HYPERTENSION, UNSPECIFIED TYPE: ICD-10-CM

## 2023-06-22 ENCOUNTER — OFFICE VISIT (OUTPATIENT)
Dept: PHYSICAL THERAPY | Facility: CLINIC | Age: 53
End: 2023-06-22
Payer: MEDICARE

## 2023-06-22 DIAGNOSIS — G89.29 CHRONIC NECK PAIN: Primary | ICD-10-CM

## 2023-06-22 DIAGNOSIS — M54.2 CERVICALGIA: ICD-10-CM

## 2023-06-22 DIAGNOSIS — M54.2 CHRONIC NECK PAIN: Primary | ICD-10-CM

## 2023-06-22 PROCEDURE — 97110 THERAPEUTIC EXERCISES: CPT | Performed by: PHYSICAL THERAPIST

## 2023-06-22 PROCEDURE — 97112 NEUROMUSCULAR REEDUCATION: CPT | Performed by: PHYSICAL THERAPIST

## 2023-06-22 NOTE — PROGRESS NOTES
"Daily Note     Today's date: 2023  Patient name: Najma Gallo  : 1970  MRN: [id number]  Referring provider: JHONATAN Fowler  Dx:   Encounter Diagnosis     ICD-10-CM    1  Chronic neck pain  M54 2     G89 29       2  Cervicalgia  M54 2                      Subjective: Pt reports feeling a little sore yesterday but wasn't too bad after last session       Objective: See treatment diary below      Assessment: Focused on thoracic and cervical mobility to improve range of motion  Improved mobility noted after stretching  Cueing required during exercises to decrease upper trap compensations  Continue to progress thoracic extension and postural strengthening  Plan: Continue per plan of care        Precautions: chronic migraines, s/p R TKA , tremors     1:1 with -1328  Manuals 6/7 6/15 6/22          Cervical side glides   Lower Cspine GII-III           Cervical upglides             Suboccipital stretch   ANKUSH                        Neuro Re-Ed             TB Row HEP RTB  RTB 3x10 RTB 3x10          TB straight arm pull down  HEP RTB  RTB 3x10 RTB 3x10          TB B ER  HEP RTB RTB 3x10 RTB 3x10          Bent over horiz abd              Seated lat pull down              Serratus wall clock             Prone I              Prone W              Bent over row   3# 3x10 ea side  3# 3x10 ea side          Bent over straight arm extension   3# 3x10 ea side 3# 3x10 ea side                       Ther Ex             UBE   3/3 3/3          SNAG   x10 ea dir (rot + ext)          Thoracic extension over bolster on table   10x5\" 10x5\"          Pec stretch at door   4x15\" 4x15\"          Upper trap stretch   3x15\"          Levator stretch   3x15\"                                    Ther Activity                                       Pt Edu              Posture, sitting position for TV, phone, and gem art  ANKUSH                          Modalities                                            "

## 2023-06-28 ENCOUNTER — OFFICE VISIT (OUTPATIENT)
Dept: PHYSICAL THERAPY | Facility: CLINIC | Age: 53
End: 2023-06-28
Payer: MEDICARE

## 2023-06-28 DIAGNOSIS — G89.29 CHRONIC NECK PAIN: Primary | ICD-10-CM

## 2023-06-28 DIAGNOSIS — M54.2 CERVICALGIA: ICD-10-CM

## 2023-06-28 DIAGNOSIS — M54.2 CHRONIC NECK PAIN: Primary | ICD-10-CM

## 2023-06-28 PROCEDURE — 97112 NEUROMUSCULAR REEDUCATION: CPT

## 2023-06-28 PROCEDURE — 97110 THERAPEUTIC EXERCISES: CPT

## 2023-06-28 NOTE — PROGRESS NOTES
"Daily Note     Today's date: 2023  Patient name: Lynette Milan  : 1970  MRN: [id number]  Referring provider: JHONATAN Villalpando  Dx:   Encounter Diagnosis     ICD-10-CM    1  Chronic neck pain  M54 2     G89 29       2  Cervicalgia  M54 2                      Subjective: Pt states compliance w/ current HEP  Objective: See treatment diary below      Assessment: Tolerated treatment well  Patient would benefit from continued PT  Occasional cues needed to maintain proper technique  No adverse effects noted w/ TE performed  Plan: Progress treatment as tolerated         Precautions: chronic migraines, s/p R TKA , tremors       Manuals 6/7 6/15 6/22 6/28         Cervical side glides   Lower Cspine GII-III           Cervical upglides             Suboccipital stretch   ANKUSH                        Neuro Re-Ed             TB Row HEP RTB  RTB 3x10 RTB 3x10 RTB 3x10         TB straight arm pull down  HEP RTB  RTB 3x10 RTB 3x10 RTB 3x10         TB B ER  HEP RTB RTB 3x10 RTB 3x10 RTB 3x10         Bent over horiz abd              Seated lat pull down              Serratus wall clock             Prone I              Prone W              Bent over row   3# 3x10 ea side  3# 3x10 ea side 3# 3x10 ea side         Bent over straight arm extension   3# 3x10 ea side 3# 3x10 ea side 3# 3x10 ea side                      Ther Ex             UBE   3/3 3/3 3'/3'         SNAG   x10 ea dir (rot + ext) x10 ea dir (rot+ext)         Thoracic extension over bolster on table   10x5\" 10x5\" nv         Pec stretch at door   4x15\" 4x15\" 4x15\"         Upper trap stretch   3x15\" 3x15\"         Levator stretch   3x15\" 3x15\"                                   Ther Activity                                       Pt Edu              Posture, sitting position for TV, phone, and gem art  ANKUSH                          Modalities                                          1:1 4:38-5:16pm    "

## 2023-06-29 ENCOUNTER — OFFICE VISIT (OUTPATIENT)
Dept: PHYSICAL THERAPY | Facility: CLINIC | Age: 53
End: 2023-06-29
Payer: MEDICARE

## 2023-06-29 DIAGNOSIS — M54.2 CERVICALGIA: ICD-10-CM

## 2023-06-29 DIAGNOSIS — G89.29 CHRONIC NECK PAIN: Primary | ICD-10-CM

## 2023-06-29 DIAGNOSIS — M54.2 CHRONIC NECK PAIN: Primary | ICD-10-CM

## 2023-06-29 PROCEDURE — 97110 THERAPEUTIC EXERCISES: CPT

## 2023-06-29 PROCEDURE — 97140 MANUAL THERAPY 1/> REGIONS: CPT

## 2023-06-29 PROCEDURE — 97112 NEUROMUSCULAR REEDUCATION: CPT

## 2023-06-29 NOTE — PROGRESS NOTES
"Daily Note     Today's date: 2023  Patient name: Katelynn Wilson  : 1970  MRN: [id number]  Referring provider: JHONATAN Joy  Dx:   Encounter Diagnosis     ICD-10-CM    1  Chronic neck pain  M54 2     G89 29       2  Cervicalgia  M54 2           Start Time:   Stop Time: 926  Total time in clinic (min): 40 minutes    Subjective: Pt reports she's at a 5/10 on the lat  Side of her neck  Objective: See treatment diary below      Assessment: Tolerated treatment fair and tolerated progression in weights from 3# to 4# with no c/o pain  Patient demonstrated fatigue post treatment, exhibited good technique with therapeutic exercises and would benefit from continued PT  Pt reported down to 2-3/10 pain level by conclusion of the session  Plan: Continue per plan of care  Progress treatment as tolerated         Precautions: chronic migraines, s/p R TKA , tremors       Manuals 6/7 6/15 6/22 6/28 6/29        Cervical side glides   Lower Cspine GII-III           Cervical upglides             Suboccipital stretch   ANKUSH   RE        STM L, UT     RE        Neuro Re-Ed             TB Row HEP RTB  RTB 3x10 RTB 3x10 RTB 3x10 RTB 3x10        TB straight arm pull down  HEP RTB  RTB 3x10 RTB 3x10 RTB 3x10 RTB 3x10        TB B ER  HEP RTB RTB 3x10 RTB 3x10 RTB 3x10 RTB 3x10        Bent over horiz abd              Seated lat pull down              Serratus wall clock             Prone I              Prone W              Bent over row   3# 3x10 ea side  3# 3x10 ea side 3# 3x10 ea side 4# 3x10 ea        Bent over straight arm extension   3# 3x10 ea side 3# 3x10 ea side 3# 3x10 ea side 4# 3x10 ea                     Ther Ex             UBE   3/3 3/3 3'/3' 3'/3'        SNAG   x10 ea dir (rot + ext) x10 ea dir (rot+ext) 10x ea (rot  & ext )        Thoracic extension over bolster on table   10x5\" 10x5\" nv 5\"x10 in chair        Pec stretch at door   4x15\" 4x15\" 4x15\" 4x15\"        Upper trap stretch   3x15\" " "3x15\" 3x15\"        Levator stretch   3x15\" 3x15\" 3x15\"                                  Ther Activity                                       Pt Edu              Posture, sitting position for TV, phone, and gem art  ANKUSH                          Modalities                                            "

## 2023-07-05 ENCOUNTER — OFFICE VISIT (OUTPATIENT)
Dept: PHYSICAL THERAPY | Facility: CLINIC | Age: 53
End: 2023-07-05
Payer: MEDICARE

## 2023-07-05 DIAGNOSIS — G89.29 CHRONIC NECK PAIN: Primary | ICD-10-CM

## 2023-07-05 DIAGNOSIS — M54.2 CHRONIC NECK PAIN: Primary | ICD-10-CM

## 2023-07-05 DIAGNOSIS — M54.2 CERVICALGIA: ICD-10-CM

## 2023-07-05 PROCEDURE — 97112 NEUROMUSCULAR REEDUCATION: CPT

## 2023-07-05 PROCEDURE — 97110 THERAPEUTIC EXERCISES: CPT

## 2023-07-05 PROCEDURE — 97140 MANUAL THERAPY 1/> REGIONS: CPT

## 2023-07-05 NOTE — PROGRESS NOTES
Daily Note     Today's date: 2023  Patient name: Linnea Lazo  : 1970  MRN: [id number]  Referring provider: Marylee Humble, CRNP  Dx:   Encounter Diagnosis     ICD-10-CM    1. Chronic neck pain  M54.2     G89.29       2. Cervicalgia  M54.2                      Subjective: Patient admits to sitting with poor posture Saturday while in her recliner and on her phone. Increased pain for several days as a result but was able to find some relief with performance of HEP and use of home TENS unit. Pain 6/10. Objective: See treatment diary below      Assessment: Tolerated treatment well. Pain ( left UT ) most severe with right UT stretching. All others tolerated well despite reported pain levels. Continues with hypertonicity throughout C-S and suboccipitals. Positive response to [facility] reporting improved C-S AROM and decreased pain/stiffness. Patient demonstrated fatigue post treatment, exhibited good technique with therapeutic exercises and would benefit from continued PT. Plan: Continue per plan of care. Progress treatment as tolerated. Precautions: chronic migraines, s/p R TKA , tremors       Manuals 6/7 6/15 6/22 6/28 6/29 7/5       Cervical side glides   Lower Cspine GII-III           Cervical upglides             Suboccipital stretch   ANKUSH   RE CM  5'       STM L, UT     RE CM  10'       Neuro Re-Ed             TB Row HEP RTB  RTB 3x10 RTB 3x10 RTB 3x10 RTB 3x10 RTB  3x10       TB straight arm pull down  HEP RTB  RTB 3x10 RTB 3x10 RTB 3x10 RTB 3x10 RTB  3x10       TB B ER  HEP RTB RTB 3x10 RTB 3x10 RTB 3x10 RTB 3x10 RTB  3x10       Bent over horiz abd              Seated lat pull down              Serratus wall clock             Prone I              Prone W              Bent over row   3# 3x10 ea side  3# 3x10 ea side 3# 3x10 ea side 4# 3x10 ea 4# 3x10 ea. Bent over straight arm extension   3# 3x10 ea side 3# 3x10 ea side 3# 3x10 ea side 4# 3x10 ea 4# 3x10 ea. Ther Ex      7/5       UBE   3/3 3/3 3'/3' 3'/3' 3'/3'       SNAG   x10 ea dir (rot + ext) x10 ea dir (rot+ext) 10x ea (rot. & ext.) 10x ea  ( rot. & ext. )       Thoracic extension over bolster on table   10x5" 10x5" nv 5"x10 in chair 5"x10 in chair       Pec stretch at door   4x15" 4x15" 4x15" 4x15" 4x15"        Upper trap stretch   3x15" 3x15" 3x15" 3x15"       Levator stretch   3x15" 3x15" 3x15" 3x15"                                 Ther Activity      7/5                                 Pt Edu       7/5       Posture, sitting position for TV, phone, and gem art  ANKUSH                          Modalities

## 2023-07-06 ENCOUNTER — OFFICE VISIT (OUTPATIENT)
Dept: FAMILY MEDICINE CLINIC | Facility: CLINIC | Age: 53
End: 2023-07-06
Payer: MEDICARE

## 2023-07-06 ENCOUNTER — HOSPITAL ENCOUNTER (OUTPATIENT)
Dept: RADIOLOGY | Age: 53
Discharge: HOME | End: 2023-07-06
Payer: MEDICARE

## 2023-07-06 VITALS — BODY MASS INDEX: 47.09 KG/M2 | HEIGHT: 66 IN | WEIGHT: 293 LBS

## 2023-07-06 VITALS
OXYGEN SATURATION: 97 % | HEART RATE: 84 BPM | DIASTOLIC BLOOD PRESSURE: 84 MMHG | HEIGHT: 66 IN | SYSTOLIC BLOOD PRESSURE: 128 MMHG | WEIGHT: 293 LBS | BODY MASS INDEX: 47.09 KG/M2 | TEMPERATURE: 98.2 F

## 2023-07-06 DIAGNOSIS — Z12.31 ENCOUNTER FOR SCREENING MAMMOGRAM FOR MALIGNANT NEOPLASM OF BREAST: ICD-10-CM

## 2023-07-06 DIAGNOSIS — R60.0 BILATERAL LEG EDEMA: Primary | ICD-10-CM

## 2023-07-06 PROCEDURE — 77067 SCR MAMMO BI INCL CAD: CPT

## 2023-07-06 PROCEDURE — 77063 BREAST TOMOSYNTHESIS BI: CPT

## 2023-07-06 PROCEDURE — 99213 OFFICE O/P EST LOW 20 MIN: CPT

## 2023-07-06 RX ORDER — FUROSEMIDE 20 MG/1
20 TABLET ORAL DAILY
Qty: 5 TABLET | Refills: 0 | Status: SHIPPED | OUTPATIENT
Start: 2023-07-06

## 2023-07-06 NOTE — PROGRESS NOTES
Name: Linda Mercado      : 1970      MRN: [id number]  Encounter Provider: JHONATAN Bragg  Encounter Date: 2023   Encounter department: [address] [place] [address] [place]     1. Bilateral leg edema  Comments:  Recurrent issue. Agreed to short term (5 day) lasix increase to help with swelling. New compression stockings ordered. Encouraged elevation of legs. Orders:  -     Compression Stocking  -     furosemide (LASIX) 20 mg tablet; Take 1 tablet (20 mg total) by mouth daily           Subjective      Bilateral leg swelling   Presents today for bilateral leg swelling. Symptoms have been present for over a month. Has been previously seen by provider where she was given keflex for cellulitis. She was encouraged to elevate legs with pillows at least 3 times a day. Today still is having residual leg swelling. Does not want to increase her daily dose of furosemide because she has a problem with urinary incontinence. Patient has compression stockings but does not wear them as they are too tight and hurt the skin around her knees    Review of Systems   Constitutional: Negative for activity change, chills, fatigue and fever. HENT: Negative for congestion, ear pain, rhinorrhea, sore throat and trouble swallowing. Eyes: Negative for pain and visual disturbance. Respiratory: Negative for cough, chest tightness and shortness of breath. Cardiovascular: Negative for chest pain, palpitations and leg swelling. Gastrointestinal: Negative for abdominal pain, constipation, diarrhea, nausea and vomiting. Genitourinary: Negative for difficulty urinating, dysuria, hematuria and urgency. Musculoskeletal: Negative for arthralgias and back pain. Skin: Negative for color change and rash. Neurological: Negative for dizziness, seizures, syncope and headaches. Psychiatric/Behavioral: Negative for dysphoric mood. The patient is not nervous/anxious.     All other systems reviewed and are negative. Current Outpatient Medications on File Prior to Visit   Medication Sig   • albuterol (Ventolin HFA) 90 mcg/act inhaler Inhale 2 puffs every 6 (six) hours as needed for wheezing   • amLODIPine (NORVASC) 5 mg tablet TAKE 1 TABLET (5 MG TOTAL) BY MOUTH DAILY   • buPROPion (WELLBUTRIN XL) 300 mg 24 hr tablet Take 300 mg by mouth daily   • carBAMazepine (CARBATROL) 300 MG 12 hr capsule Take 1 capsule by mouth in the morning   • Cholecalciferol (Vitamin D3) 125 MCG (5000 UT) CAPS Take by mouth in the morning   • cyclobenzaprine (FLEXERIL) 10 mg tablet Take 1 tablet (10 mg total) by mouth daily at bedtime   • Diclofenac Sodium (VOLTAREN) 1 % Apply 2 g topically 3 (three) times a day as needed (pain) For pain to affected area   • divalproex sodium (DEPAKOTE ER) 250 mg 24 hr tablet    • divalproex sodium (DEPAKOTE ER) 500 mg 24 hr tablet 500 mg 2 (two) times a day   • estradiol (ESTRACE) 0.5 MG tablet Take 1 tablet (0.5 mg total) by mouth daily   • Fesoterodine Fumarate ER (Toviaz) 4 MG TB24 Take 1 tablet by mouth daily   • fluticasone (FLOVENT HFA) 110 MCG/ACT inhaler Inhale 2 puffs as needed Rinse mouth after use.    • furosemide (LASIX) 20 mg tablet TAKE 1 TABLET BY MOUTH DAILY   • haloperidol decanoate (Haldol Decanoate) 50 mg/mL injection every 30 (thirty) days   • hydrocortisone 2.5 % ointment Apply topically 2 (two) times a day   • hydrocortisone 2.5 % ointment Apply topically 2 (two) times a day   • levothyroxine (Euthyrox) 125 mcg tablet Take 1 tablet (125 mcg total) by mouth daily in the early morning   • losartan (COZAAR) 50 mg tablet TAKE ONE TABLET BY MOUTH DAILY   • methocarbamol (ROBAXIN) 500 mg tablet Take 1 tablet (500 mg total) by mouth 3 (three) times a day as needed for muscle spasms   • metoprolol tartrate (LOPRESSOR) 25 mg tablet TAKE ONE TABLET BY MOUTH EVERY 12 HOURS   • mometasone (ELOCON) 0.1 % lotion Apply topically daily   • phenazopyridine (PYRIDIUM) 100 mg tablet Take 1 tablet (100 mg total) by mouth 3 (three) times a day as needed for bladder spasms   • pilocarpine (SALAGEN) 5 mg tablet Take 5 mg by mouth 2 (two) times a day   • RABEprazole (ACIPHEX) 20 MG tablet Take 20 mg by mouth 2 (two) times a day   • rOPINIRole (REQUIP) 0.5 mg tablet Take by mouth daily at bedtime   • rOPINIRole (REQUIP) 1 mg tablet    • topiramate (TOPAMAX) 200 MG tablet    • topiramate (TOPAMAX) 50 MG tablet Take 50 mg by mouth 2 (two) times a day   • trospium chloride (SANCTURA) 20 mg tablet Take 20 mg by mouth 2 (two) times a day   • venlafaxine (EFFEXOR) 37.5 mg tablet Take 37.5 mg by mouth daily   • buPROPion (WELLBUTRIN XL) 150 mg 24 hr tablet Take 1 tablet (150 mg total) by mouth daily (Patient not taking: Reported on 3/9/2023)   • colestipol (COLESTID) 1 g tablet Take 1 tablet (1 g total) by mouth 2 (two) times a day   • divalproex sodium (DEPAKOTE) 250 mg EC tablet Take 3 tablets (750 mg total) by mouth every 12 (twelve) hours   • hydrOXYzine HCL (ATARAX) 25 mg tablet Take 1 tablet (25 mg total) by mouth every 6 (six) hours as needed for itching (mild anxiety)   • Lurasidone HCl 60 MG TABS Take 1 tablet (60 mg total) by mouth 2 (two) times a day TAKEN IN THE MORNING AND AT NIGHT-----Latuda   • topiramate (TOPAMAX) 100 mg tablet Take 1.5 tablets (150 mg total) by mouth 2 (two) times a day       Objective     /84 (BP Location: Left arm, Patient Position: Sitting, Cuff Size: Adult)   Pulse 84   Temp 98.2 °F (36.8 °C) (Temporal)   Ht 5' 6" (1.676 m)   Wt (!) 137 kg (301 lb)   SpO2 97%   BMI 48.58 kg/m²     Physical Exam  Vitals and nursing note reviewed. Constitutional:       Appearance: Normal appearance. She is normal weight. HENT:      Head: Normocephalic. Right Ear: Tympanic membrane, ear canal and external ear normal. There is no impacted cerumen. Left Ear: Tympanic membrane, ear canal and external ear normal. There is no impacted cerumen.       Nose: Nose normal. Mouth/Throat:      Mouth: Mucous membranes are moist.      Pharynx: Oropharynx is clear. Eyes:      Extraocular Movements: Extraocular movements intact. Conjunctiva/sclera: Conjunctivae normal.      Pupils: Pupils are equal, round, and reactive to light. Cardiovascular:      Rate and Rhythm: Normal rate and regular rhythm. Pulses: Normal pulses. Dorsalis pedis pulses are 2+ on the right side and 2+ on the left side. Posterior tibial pulses are 2+ on the right side and 2+ on the left side. Heart sounds: Normal heart sounds. Pulmonary:      Effort: Pulmonary effort is normal.      Breath sounds: Normal breath sounds. Abdominal:      General: Bowel sounds are normal. There is no distension. Palpations: Abdomen is soft. Tenderness: There is no abdominal tenderness. Musculoskeletal:      Cervical back: Normal range of motion. Right lower le+ Edema present. Left lower le+ Edema present. Skin:     General: Skin is warm. Capillary Refill: Capillary refill takes less than 2 seconds. Neurological:      General: No focal deficit present. Mental Status: She is alert and oriented to person, place, and time. Mental status is at baseline. Psychiatric:         Mood and Affect: Mood normal.         Behavior: Behavior normal.         Thought Content: Thought content normal.         Judgment: Judgment normal.       Patient Instructions     Leg Edema   WHAT YOU NEED TO KNOW:   Leg edema is swelling caused by fluid buildup. Your legs may swell if you sit or stand for long periods of time, are pregnant, or are injured. Swelling may also occur if you have heart failure or circulation problems. This means that your heart does not pump blood through your body as it should. DISCHARGE INSTRUCTIONS:   Call your local emergency number (060 in the [address]) for any of the following:   • You cannot walk.     • You have chest pain or trouble breathing that is worse when you lie down. • You suddenly feel lightheaded and have trouble breathing. • You have new and sudden chest pain. You may have more pain when you take deep breaths or cough. • You cough up blood. Return to the emergency department if:   • You feel faint or confused. • Your skin turns blue or gray. • Your leg feels warm, tender, and painful. It may be swollen and red. Call your doctor if:   • You have a fever or feel more tired than usual.    • The veins in your legs look larger than usual. They may look full or bulging. • Your legs itch or feel heavy. • You have red or white areas or sores on your legs. The skin may also appear dimpled or have indentations. • You are gaining weight. • You have trouble moving your ankles. • The swelling does not go away, or other parts of your body swell. • You have questions or concerns about your condition or care. Self-care:   • Elevate your legs. Raise your legs above the level of your heart as often as you can. This will help decrease swelling and pain. Prop your legs on pillows or blankets to keep them elevated comfortably. • Wear pressure stockings, if directed. These tight stockings put pressure on your legs to promote blood flow and prevent blood clots. Put them on before you get out of bed. Wear the stockings during the day. Do not wear them while you sleep. • Stay active. Do not stand or sit for long periods of time. Ask your healthcare provider about the best exercise plan for you. • Eat healthy foods. Healthy foods include fruits, vegetables, whole-grain breads, low-fat dairy products, beans, lean meats, and fish. Ask if you need to be on a special diet. • Limit sodium (salt). Salt will make your body hold even more fluid. Your healthcare provider will tell you how many milligrams (mg) of salt you can have each day.        Follow up with your doctor as directed:  Write down your questions so you remember to ask them during your visits. © Copyright [employer] 2022 Information is for End User's use only and may not be sold, redistributed or otherwise used for commercial purposes. The above information is an  only. It is not intended as medical advice for individual conditions or treatments. Talk to your doctor, nurse or pharmacist before following any medical regimen to see if it is safe and effective for you.         JHONATAN Gregorio

## 2023-07-06 NOTE — PATIENT INSTRUCTIONS
Leg Edema   WHAT YOU NEED TO KNOW:   Leg edema is swelling caused by fluid buildup. Your legs may swell if you sit or stand for long periods of time, are pregnant, or are injured. Swelling may also occur if you have heart failure or circulation problems. This means that your heart does not pump blood through your body as it should. DISCHARGE INSTRUCTIONS:   Call your local emergency number (911 in the [address]) for any of the following: You cannot walk. You have chest pain or trouble breathing that is worse when you lie down. You suddenly feel lightheaded and have trouble breathing. You have new and sudden chest pain. You may have more pain when you take deep breaths or cough. You cough up blood. Return to the emergency department if:   You feel faint or confused. Your skin turns blue or gray. Your leg feels warm, tender, and painful. It may be swollen and red. Call your doctor if:   You have a fever or feel more tired than usual.    The veins in your legs look larger than usual. They may look full or bulging. Your legs itch or feel heavy. You have red or white areas or sores on your legs. The skin may also appear dimpled or have indentations. You are gaining weight. You have trouble moving your ankles. The swelling does not go away, or other parts of your body swell. You have questions or concerns about your condition or care. Self-care:   Elevate your legs. Raise your legs above the level of your heart as often as you can. This will help decrease swelling and pain. Prop your legs on pillows or blankets to keep them elevated comfortably. Wear pressure stockings, if directed. These tight stockings put pressure on your legs to promote blood flow and prevent blood clots. Put them on before you get out of bed. Wear the stockings during the day. Do not wear them while you sleep. Stay active. Do not stand or sit for long periods of time.  Ask your healthcare provider about the best exercise plan for you. Eat healthy foods. Healthy foods include fruits, vegetables, whole-grain breads, low-fat dairy products, beans, lean meats, and fish. Ask if you need to be on a special diet. Limit sodium (salt). Salt will make your body hold even more fluid. Your healthcare provider will tell you how many milligrams (mg) of salt you can have each day. Follow up with your doctor as directed:  Write down your questions so you remember to ask them during your visits. © [facility] [employer] 2022 Information is for End User's use only and may not be sold, redistributed or otherwise used for commercial purposes. The above information is an  only. It is not intended as medical advice for individual conditions or treatments. Talk to your doctor, nurse or pharmacist before following any medical regimen to see if it is safe and effective for you.

## 2023-07-10 ENCOUNTER — TELEPHONE (OUTPATIENT)
Dept: OBGYN CLINIC | Facility: CLINIC | Age: 53
End: 2023-07-10

## 2023-07-10 NOTE — TELEPHONE ENCOUNTER
pt states she has "sore spot" near (L) labial, external, does not see any redness but feels like "cut" & unimproved sine 5/2023. Recommended to schedule appointment - yearly due 12/2023. Problem appointment scheduled.

## 2023-07-11 ENCOUNTER — OFFICE VISIT (OUTPATIENT)
Dept: OBGYN CLINIC | Facility: CLINIC | Age: 53
End: 2023-07-11
Payer: MEDICARE

## 2023-07-11 VITALS
WEIGHT: 293 LBS | DIASTOLIC BLOOD PRESSURE: 80 MMHG | BODY MASS INDEX: 47.09 KG/M2 | SYSTOLIC BLOOD PRESSURE: 124 MMHG | HEIGHT: 66 IN

## 2023-07-11 DIAGNOSIS — F19.10 OTHER PSYCHOACTIVE SUBSTANCE ABUSE, UNCOMPLICATED (HCC): ICD-10-CM

## 2023-07-11 DIAGNOSIS — M35.00 SJOGREN'S SYNDROME, WITH UNSPECIFIED ORGAN INVOLVEMENT (HCC): ICD-10-CM

## 2023-07-11 DIAGNOSIS — N76.3 SUBACUTE VULVITIS: Primary | ICD-10-CM

## 2023-07-11 PROCEDURE — 99213 OFFICE O/P EST LOW 20 MIN: CPT | Performed by: OBSTETRICS & GYNECOLOGY

## 2023-07-11 RX ORDER — CLOTRIMAZOLE AND BETAMETHASONE DIPROPIONATE 10; .64 MG/G; MG/G
CREAM TOPICAL 2 TIMES DAILY
Qty: 30 G | Refills: 0 | Status: SHIPPED | OUTPATIENT
Start: 2023-07-11

## 2023-07-11 RX ORDER — PREDNISONE 20 MG/1
TABLET ORAL
COMMUNITY

## 2023-07-11 RX ORDER — ERGOCALCIFEROL 1.25 MG/1
CAPSULE ORAL
COMMUNITY

## 2023-07-11 RX ORDER — DEXAMETHASONE 1 MG
TABLET ORAL
COMMUNITY

## 2023-07-11 RX ORDER — CARBOXYMETHYLCELLULOSE SODIUM 5 MG/ML
SOLUTION/ DROPS OPHTHALMIC
COMMUNITY

## 2023-07-11 RX ORDER — MEMANTINE HYDROCHLORIDE 10 MG/1
TABLET ORAL
COMMUNITY

## 2023-07-11 RX ORDER — CYPROHEPTADINE HYDROCHLORIDE 4 MG/1
TABLET ORAL
COMMUNITY

## 2023-07-11 NOTE — PROGRESS NOTES
Assessment/Plan:  Patient reassured. We will try Lotrisone cream locally x7 days. She will call with update in 1 to 2 weeks. Resume annual GYN exam as scheduled. No problem-specific Assessment & Plan notes found for this encounter. Diagnoses and all orders for this visit:    Subacute vulvitis  -     clotrimazole-betamethasone (LOTRISONE) 1-0.05 % cream; Apply topically 2 (two) times a day    Sjogren's syndrome, with unspecified organ involvement (HCC)    Other psychoactive substance abuse, uncomplicated (HCC)    Other orders  -     carboxymethylcellulose (REFRESH PLUS) 0.5 % SOLN  -     cyproheptadine (PERIACTIN) 4 mg tablet  -     dexamethasone (DECADRON) 1 mg tablet  -     ergocalciferol (ERGOCALCIFEROL) 1.25 MG (96622 UT) capsule  -     memantine (NAMENDA) 10 mg tablet  -     predniSONE 20 mg tablet  -     triamcinolone (KENALOG) 0.1 % ointment;  (Patient not taking: Reported on 7/11/2023)          Subjective:      Patient ID: Leesa Merida is a 47 y.o. female. HPI     This is a very pleasant 81-year-old female G0 who presents complaining of left labial tenderness over the last 2 months. She describes this as discomfort. She denies any vaginal bleeding or spotting. No vaginal discharge. Patient is not sexually active. history of supracervical hysterectomy, unilateral salpingo-oophorectomy in her early 45s due to irregular bleeding. She has been on oral estrogen replacement therapy for the last 6 years. She does follow-up with urogynecology for overactive bladder. The following portions of the patient's history were reviewed and updated as appropriate: allergies, current medications, past family history, past medical history, past social history, past surgical history and problem list.    Review of Systems   Constitutional: Negative for fatigue, fever and unexpected weight change. Respiratory: Negative for cough, chest tightness, shortness of breath and wheezing.     Cardiovascular: Negative. Negative for chest pain and palpitations. Gastrointestinal: Negative. Negative for abdominal distention, abdominal pain, blood in stool, constipation, diarrhea, nausea and vomiting. Genitourinary: Negative. Negative for difficulty urinating, dyspareunia, dysuria, flank pain, frequency, genital sores, hematuria, pelvic pain, urgency, vaginal bleeding, vaginal discharge and vaginal pain. Skin: Negative for rash. Objective:      /80   Ht 5' 6" (1.676 m)   Wt 136 kg (298 lb 12.8 oz)   BMI 48.23 kg/m²          Physical Exam  Constitutional:       Appearance: Normal appearance. Abdominal:      General: Bowel sounds are normal. There is no distension. Palpations: Abdomen is soft. Tenderness: There is no abdominal tenderness. There is no guarding or rebound. Genitourinary:     Labia:         Right: No rash, tenderness, lesion or injury. Left: Tenderness present. No rash, lesion or injury. Vagina: No signs of injury. No vaginal discharge, tenderness or lesions. Cervix: No cervical motion tenderness, discharge, friability, lesion, erythema or cervical bleeding. Uterus: Absent. Neurological:      Mental Status: She is alert and oriented to person, place, and time. Psychiatric:         Behavior: Behavior normal.       Localized erythema left inferior labia, no obvious lesions. The vagina is evident of slight estrogen deficiency. No vaginal discharge.   Vaginal pH is normal.

## 2023-07-12 ENCOUNTER — APPOINTMENT (OUTPATIENT)
Dept: PHYSICAL THERAPY | Facility: CLINIC | Age: 53
End: 2023-07-12
Payer: MEDICARE

## 2023-07-12 DIAGNOSIS — I10 HYPERTENSION, UNSPECIFIED TYPE: ICD-10-CM

## 2023-07-12 RX ORDER — AMLODIPINE BESYLATE 5 MG/1
5 TABLET ORAL DAILY
Qty: 90 TABLET | Refills: 3 | Status: ON HOLD | OUTPATIENT
Start: 2023-07-12

## 2023-07-12 NOTE — PROGRESS NOTES
Daily Note     Today's date: 2023  Patient name: Claudeen Limerick  : 1970  MRN: [id number]  Referring provider: JHONATAN Richards  Dx:   Encounter Diagnosis     ICD-10-CM    1. Chronic neck pain  M54.2     G89.29           Start Time: 1037  Stop Time: 1117  Total time in clinic (min): 40 minutes    Subjective: The patient presents today with a report of L UT tightness with a "knot feeling.". The patient reports slight improvement in symptoms since previous session. Objective: See treatment diary below      Assessment: The PT continued focusing on stretching and recruiting muscle activation during today's session. The patient's program was maintained with continued performance of a variety of exercises  to promote proper muscle activation with decreased compensations. The patient tolerated manual and active treatment well today. The patient would benefit from further skilled PT services. Plan: Continue per plan of care. Precautions: chronic migraines, s/p R TKA , tremors       Manuals 6/7 6/15 6/22 6/28 6/29 7/5 7/13      Cervical side glides   Lower Cspine GII-III           Cervical upglides             Suboccipital stretch   ANKUSH   RE CM  5' AL  5'      STM L, UT     RE CM  10' AL  10'      Neuro Re-Ed            TB Row HEP RTB  RTB 3x10 RTB 3x10 RTB 3x10 RTB 3x10 RTB  3x10 AL  R TB  3x10      TB straight arm pull down  HEP RTB  RTB 3x10 RTB 3x10 RTB 3x10 RTB 3x10 RTB  3x10 AL  R TB  3x10      TB B ER  HEP RTB RTB 3x10 RTB 3x10 RTB 3x10 RTB 3x10 RTB  3x10 AL  R TB  3x10      Bent over horiz abd              Seated lat pull down              Serratus wall clock             Prone I              Prone W              Bent over row   3# 3x10 ea side  3# 3x10 ea side 3# 3x10 ea side 4# 3x10 ea 4# 3x10 ea. AL  4#  3x10 each      Bent over straight arm extension   3# 3x10 ea side 3# 3x10 ea side 3# 3x10 ea side 4# 3x10 ea 4# 3x10 ea.  AL  4#  3x10                   Ther Ex       7/13      UBE   3/3 3/3 3'/3' 3'/3' 3'/3' 3'/3'      SNAG   x10 ea dir (rot + ext) x10 ea dir (rot+ext) 10x ea (rot. & ext.) 10x ea  ( rot. & ext. ) 10x each (rot.  And ext)       Thoracic extension over bolster on table   10x5" 10x5" nv 5"x10 in chair 5"x10 in chair AL  In chair   5"x10      Pec stretch at door   4x15" 4x15" 4x15" 4x15" 4x15"        Upper trap stretch   3x15" 3x15" 3x15" 3x15" AL  3x15"      Levator stretch   3x15" 3x15" 3x15" 3x15" AL  3x15"                                Ther Activity      7/5                                 Pt Edu       7/5       Posture, sitting position for TV, phone, and gem art  ANKUSH                          Modalities

## 2023-07-13 ENCOUNTER — OFFICE VISIT (OUTPATIENT)
Dept: PHYSICAL THERAPY | Facility: CLINIC | Age: 53
End: 2023-07-13
Payer: MEDICARE

## 2023-07-13 DIAGNOSIS — M54.2 CHRONIC NECK PAIN: Primary | ICD-10-CM

## 2023-07-13 DIAGNOSIS — G89.29 CHRONIC NECK PAIN: Primary | ICD-10-CM

## 2023-07-13 PROCEDURE — 97140 MANUAL THERAPY 1/> REGIONS: CPT

## 2023-07-13 PROCEDURE — 97112 NEUROMUSCULAR REEDUCATION: CPT

## 2023-07-13 PROCEDURE — 97110 THERAPEUTIC EXERCISES: CPT

## 2023-07-17 ENCOUNTER — OFFICE VISIT (OUTPATIENT)
Dept: FAMILY MEDICINE CLINIC | Facility: CLINIC | Age: 53
End: 2023-07-17
Payer: MEDICARE

## 2023-07-17 VITALS
OXYGEN SATURATION: 97 % | BODY MASS INDEX: 48.49 KG/M2 | RESPIRATION RATE: 20 BRPM | TEMPERATURE: 97.9 F | DIASTOLIC BLOOD PRESSURE: 80 MMHG | SYSTOLIC BLOOD PRESSURE: 120 MMHG | WEIGHT: 293 LBS | HEART RATE: 75 BPM

## 2023-07-17 DIAGNOSIS — M77.01 MEDIAL EPICONDYLITIS OF RIGHT ELBOW: Primary | ICD-10-CM

## 2023-07-17 DIAGNOSIS — M21.611 BUNION, RIGHT: ICD-10-CM

## 2023-07-17 PROCEDURE — 99213 OFFICE O/P EST LOW 20 MIN: CPT

## 2023-07-17 NOTE — PATIENT INSTRUCTIONS
Tennis Elbow Exercises   AMBULATORY CARE:   Tennis elbow exercises  help decrease pain in your elbow, forearm, wrist, and hand. They also help strengthen your arm muscles and prevent more injury. Call your doctor if:   You have increased pain or weakness in your arm, wrist, hand, or fingers. You have new numbness or tingling in your arm, hand, or fingers. You have questions or concerns about tennis elbow exercises. What you need to know about exercise safety:   Start slowly. These are beginning exercises. Ask your healthcare provider if you need to see a physical therapist for more advanced exercises. As you get stronger, you may be able to do more sets of each exercise or add weights. Stop if you feel pain. It is normal to feel some discomfort at first, but you should not feel pain. Regular exercise will help decrease your discomfort over time. Follow the exercise program recommended by your healthcare provider. He or she will tell you which exercises are best for your condition. He or she will also tell you how many repetitions to do and how often you should do the exercises. Perform tennis elbow stretches safely:  Ask your healthcare provider how often to do these stretches:  Finger extensions:  Hold your fingers close together. Put a rubber band around the outside of your fingers and thumb. Spread your fingers apart and then slowly bring them together without letting the rubber band fall off. Repeat 10 times. Wrist flexor stretch:  Hold your arm straight out in front of you with your palm facing down. Use your other hand to grasp your fingers. Keep your elbow straight and slowly bend your hand back. Your fingertips should point up and your palm should face away from you. Do this until you feel a stretch in the top of your wrist. Hold for 10 seconds. Repeat 5 times. Wrist extensor stretch: This stretch is the opposite of the wrist flexor stretch.  Hold your arm straight out in front of you with your palm facing down. Use your other hand to grasp your fingers. Keep your elbow straight and slowly bend your hand down. Your fingertips should point down and your palm should face you. Do this until you feel a stretch in your wrist. Hold for 10 seconds. Repeat 5 times. Perform tennis elbow strengthening exercises safely:  Always stretch before you do strengthening exercises. As you get stronger, your healthcare provider may tell you to you add weights or more repetitions to your strengthening exercises. He or she will tell you how much weight to use. Wrist curls:  Hold your arm out in front of you. Turn your palm so that it faces up and hold a weight in your hand. Ask your healthcare provider how much weight you should use. Slowly bend and straighten your wrist 30 times. Bicep curls:  Place your hand under your injured elbow for support. Turn your palm so that it faces up and hold a weight in your hand. Ask your healthcare provider how much weight you should use. Slowly bend and straighten your elbow 30 times. :  Hold a soft rubber ball or tennis ball in your hand. Squeeze the ball as hard as you can and hold this position. Ask your healthcare provider how long to hold this position. Repeat this exercise as directed by your healthcare provider. Follow up with your doctor as directed:  Write down your questions so you remember to ask them during your visits. © Copyright Liss Arriaga 2022 Information is for End User's use only and may not be sold, redistributed or otherwise used for commercial purposes. The above information is an  only. It is not intended as medical advice for individual conditions or treatments. Talk to your doctor, nurse or pharmacist before following any medical regimen to see if it is safe and effective for you.

## 2023-07-17 NOTE — PROGRESS NOTES
Name: Uriel Porter      : 1970      MRN: [id number]  Encounter Provider: JHONATAN Greenfield  Encounter Date: 2023   Encounter department: [facility] [place] [facility]unez     1. Medial epicondylitis of right elbow  Assessment & Plan:  Muscular pain over the medial epicondyle related to over use at work   Has brace from previous epicondylitis but no relief of pain   Relief is provided from rest   Exercises given   Voltaren gel to use as needed. Orders:  -     Diclofenac Sodium (VOLTAREN) 1 %; Apply 2 g topically 4 (four) times a day    2. Bunion, right  -     Ambulatory Referral to Podiatry; Future         Subjective      Arm Pain   The incident occurred more than 1 week ago. The incident occurred at work. The injury mechanism was repetitive motion. The pain is present in the right elbow. The quality of the pain is described as shooting. The pain radiates to the right arm. The pain is at a severity of 5/10. The pain is mild. The pain has been constant since the incident. Associated symptoms include numbness and tingling. Pertinent negatives include no chest pain. The symptoms are aggravated by movement. She has tried nothing for the symptoms. The treatment provided no relief. Review of Systems   Constitutional: Negative for activity change, chills, fatigue and fever. HENT: Negative for congestion, ear pain, rhinorrhea, sore throat and trouble swallowing. Eyes: Negative for pain and visual disturbance. Respiratory: Negative for cough, chest tightness and shortness of breath. Cardiovascular: Negative for chest pain, palpitations and leg swelling. Gastrointestinal: Negative for abdominal pain, constipation, diarrhea, nausea and vomiting. Genitourinary: Negative for difficulty urinating, dysuria, hematuria and urgency. Musculoskeletal: Positive for myalgias. Negative for arthralgias and back pain. Skin: Negative for color change and rash. Neurological: Positive for tingling and numbness. Negative for dizziness, seizures, syncope and headaches. Psychiatric/Behavioral: Negative for dysphoric mood. The patient is not nervous/anxious. All other systems reviewed and are negative. Current Outpatient Medications on File Prior to Visit   Medication Sig   • albuterol (Ventolin HFA) 90 mcg/act inhaler Inhale 2 puffs every 6 (six) hours as needed for wheezing   • amLODIPine (NORVASC) 5 mg tablet TAKE 1 TABLET (5 MG TOTAL) BY MOUTH DAILY   • buPROPion (WELLBUTRIN XL) 300 mg 24 hr tablet Take 300 mg by mouth daily   • carBAMazepine (CARBATROL) 300 MG 12 hr capsule Take 1 capsule by mouth in the morning   • carboxymethylcellulose (REFRESH PLUS) 0.5 % SOLN    • Cholecalciferol (Vitamin D3) 125 MCG (5000 UT) CAPS Take by mouth in the morning   • clotrimazole-betamethasone (LOTRISONE) 1-0.05 % cream Apply topically 2 (two) times a day   • cyclobenzaprine (FLEXERIL) 10 mg tablet Take 1 tablet (10 mg total) by mouth daily at bedtime   • cyproheptadine (PERIACTIN) 4 mg tablet    • dexamethasone (DECADRON) 1 mg tablet    • Diclofenac Sodium (VOLTAREN) 1 % Apply 2 g topically 3 (three) times a day as needed (pain) For pain to affected area   • divalproex sodium (DEPAKOTE ER) 250 mg 24 hr tablet    • divalproex sodium (DEPAKOTE ER) 500 mg 24 hr tablet 500 mg 2 (two) times a day   • ergocalciferol (ERGOCALCIFEROL) 1.25 MG (47861 UT) capsule    • estradiol (ESTRACE) 0.5 MG tablet Take 1 tablet (0.5 mg total) by mouth daily   • Fesoterodine Fumarate ER (Toviaz) 4 MG TB24 Take 1 tablet by mouth daily   • fluticasone (FLOVENT HFA) 110 MCG/ACT inhaler Inhale 2 puffs as needed Rinse mouth after use.    • furosemide (LASIX) 20 mg tablet TAKE 1 TABLET BY MOUTH DAILY   • furosemide (LASIX) 20 mg tablet Take 1 tablet (20 mg total) by mouth daily   • haloperidol decanoate (Haldol Decanoate) 50 mg/mL injection every 30 (thirty) days   • hydrocortisone 2.5 % ointment Apply topically 2 (two) times a day   • hydrocortisone 2.5 % ointment Apply topically 2 (two) times a day   • levothyroxine (Euthyrox) 125 mcg tablet Take 1 tablet (125 mcg total) by mouth daily in the early morning   • losartan (COZAAR) 50 mg tablet TAKE ONE TABLET BY MOUTH DAILY   • memantine (NAMENDA) 10 mg tablet    • metoprolol tartrate (LOPRESSOR) 25 mg tablet TAKE ONE TABLET BY MOUTH EVERY 12 HOURS   • phenazopyridine (PYRIDIUM) 100 mg tablet Take 1 tablet (100 mg total) by mouth 3 (three) times a day as needed for bladder spasms   • pilocarpine (SALAGEN) 5 mg tablet Take 5 mg by mouth 2 (two) times a day   • predniSONE 20 mg tablet    • RABEprazole (ACIPHEX) 20 MG tablet Take 20 mg by mouth 2 (two) times a day   • rOPINIRole (REQUIP) 0.5 mg tablet Take by mouth daily at bedtime   • rOPINIRole (REQUIP) 1 mg tablet    • topiramate (TOPAMAX) 200 MG tablet    • trospium chloride (SANCTURA) 20 mg tablet Take 20 mg by mouth 2 (two) times a day   • venlafaxine (EFFEXOR) 37.5 mg tablet Take 37.5 mg by mouth daily   • buPROPion (WELLBUTRIN XL) 150 mg 24 hr tablet Take 1 tablet (150 mg total) by mouth daily (Patient not taking: Reported on 3/9/2023)   • colestipol (COLESTID) 1 g tablet Take 1 tablet (1 g total) by mouth 2 (two) times a day   • divalproex sodium (DEPAKOTE) 250 mg EC tablet Take 3 tablets (750 mg total) by mouth every 12 (twelve) hours   • hydrOXYzine HCL (ATARAX) 25 mg tablet Take 1 tablet (25 mg total) by mouth every 6 (six) hours as needed for itching (mild anxiety)   • Lurasidone HCl 60 MG TABS Take 1 tablet (60 mg total) by mouth 2 (two) times a day TAKEN IN THE MORNING AND AT NIGHT-----Latuda   • methocarbamol (ROBAXIN) 500 mg tablet Take 1 tablet (500 mg total) by mouth 3 (three) times a day as needed for muscle spasms (Patient not taking: Reported on 7/11/2023)   • mometasone (ELOCON) 0.1 % lotion Apply topically daily (Patient not taking: Reported on 7/11/2023)   • topiramate (TOPAMAX) 100 mg tablet Take 1.5 tablets (150 mg total) by mouth 2 (two) times a day   • topiramate (TOPAMAX) 50 MG tablet Take 50 mg by mouth 2 (two) times a day (Patient not taking: Reported on 7/11/2023)   • triamcinolone (KENALOG) 0.1 % ointment  (Patient not taking: Reported on 7/11/2023)       Objective     /80 (BP Location: Left arm, Patient Position: Sitting, Cuff Size: Large)   Pulse 75   Temp 97.9 °F (36.6 °C) (Temporal)   Resp 20   Wt (!) 136 kg (300 lb 6.4 oz)   SpO2 97%   BMI 48.49 kg/m²     Physical Exam  Vitals and nursing note reviewed. Constitutional:       Appearance: Normal appearance. She is normal weight. HENT:      Head: Normocephalic. Right Ear: Tympanic membrane, ear canal and external ear normal. There is no impacted cerumen. Left Ear: Tympanic membrane, ear canal and external ear normal. There is no impacted cerumen. Nose: Nose normal.      Mouth/Throat:      Mouth: Mucous membranes are moist.      Pharynx: Oropharynx is clear. Eyes:      Extraocular Movements: Extraocular movements intact. Conjunctiva/sclera: Conjunctivae normal.      Pupils: Pupils are equal, round, and reactive to light. Cardiovascular:      Rate and Rhythm: Normal rate and regular rhythm. Pulses: Normal pulses. Heart sounds: Normal heart sounds. Pulmonary:      Effort: Pulmonary effort is normal.      Breath sounds: Normal breath sounds. Abdominal:      General: Bowel sounds are normal. There is no distension. Palpations: Abdomen is soft. Tenderness: There is no abdominal tenderness. Musculoskeletal:         General: Normal range of motion. Right elbow: Tenderness present in medial epicondyle. Cervical back: Normal range of motion. Skin:     General: Skin is warm. Capillary Refill: Capillary refill takes less than 2 seconds. Neurological:      General: No focal deficit present. Mental Status: She is alert and oriented to person, place, and time.  Mental status is at baseline. Psychiatric:         Mood and Affect: Mood normal.         Behavior: Behavior normal.         Thought Content: Thought content normal.         Judgment: Judgment normal.       Patient Instructions     Tennis Elbow Exercises   AMBULATORY CARE:   Tennis elbow exercises  help decrease pain in your elbow, forearm, wrist, and hand. They also help strengthen your arm muscles and prevent more injury. Call your doctor if:   • You have increased pain or weakness in your arm, wrist, hand, or fingers. • You have new numbness or tingling in your arm, hand, or fingers. • You have questions or concerns about tennis elbow exercises. What you need to know about exercise safety:   • Start slowly. These are beginning exercises. Ask your healthcare provider if you need to see a physical therapist for more advanced exercises. As you get stronger, you may be able to do more sets of each exercise or add weights. • Stop if you feel pain. It is normal to feel some discomfort at first, but you should not feel pain. Regular exercise will help decrease your discomfort over time. • Follow the exercise program recommended by your healthcare provider. He or she will tell you which exercises are best for your condition. He or she will also tell you how many repetitions to do and how often you should do the exercises. Perform tennis elbow stretches safely:  Ask your healthcare provider how often to do these stretches:  • Finger extensions:  Hold your fingers close together. Put a rubber band around the outside of your fingers and thumb. Spread your fingers apart and then slowly bring them together without letting the rubber band fall off. Repeat 10 times. • Wrist flexor stretch:  Hold your arm straight out in front of you with your palm facing down. Use your other hand to grasp your fingers. Keep your elbow straight and slowly bend your hand back.  Your fingertips should point up and your palm should face away from you. Do this until you feel a stretch in the top of your wrist. Hold for 10 seconds. Repeat 5 times. • Wrist extensor stretch: This stretch is the opposite of the wrist flexor stretch. Hold your arm straight out in front of you with your palm facing down. Use your other hand to grasp your fingers. Keep your elbow straight and slowly bend your hand down. Your fingertips should point down and your palm should face you. Do this until you feel a stretch in your wrist. Hold for 10 seconds. Repeat 5 times. Perform tennis elbow strengthening exercises safely:  Always stretch before you do strengthening exercises. As you get stronger, your healthcare provider may tell you to you add weights or more repetitions to your strengthening exercises. He or she will tell you how much weight to use. • Wrist curls:  Hold your arm out in front of you. Turn your palm so that it faces up and hold a weight in your hand. Ask your healthcare provider how much weight you should use. Slowly bend and straighten your wrist 30 times. • Bicep curls:  Place your hand under your injured elbow for support. Turn your palm so that it faces up and hold a weight in your hand. Ask your healthcare provider how much weight you should use. Slowly bend and straighten your elbow 30 times. • :  Hold a soft rubber ball or tennis ball in your hand. Squeeze the ball as hard as you can and hold this position. Ask your healthcare provider how long to hold this position. Repeat this exercise as directed by your healthcare provider. Follow up with your doctor as directed:  Write down your questions so you remember to ask them during your visits. © Copyright Nas Murray 2022 Information is for End User's use only and may not be sold, redistributed or otherwise used for commercial purposes. The above information is an  only.  It is not intended as medical advice for individual conditions or treatments. Talk to your doctor, nurse or pharmacist before following any medical regimen to see if it is safe and effective for you.         JHONATAN Ace

## 2023-07-17 NOTE — LETTER
July 17, 2023     Patient: Yair Berumen  YOB: 1970  Date of Visit: 7/17/2023      To Whom it May Concern:    Linda Sharma is under my professional care. Justino Cerrato was seen in my office on 7/17/2023. Justino Cerrato may return to work on 7/17/23 . May be late for work related to appointment    If you have any questions or concerns, please don't hesitate to call.          Sincerely,          JHONATAN Kennedy        CC: No Recipients

## 2023-07-17 NOTE — ASSESSMENT & PLAN NOTE
Muscular pain over the medial epicondyle related to over use at work   Has brace from previous epicondylitis but no relief of pain   Relief is provided from rest   Exercises given   Voltaren gel to use as needed.

## 2023-07-19 ENCOUNTER — OFFICE VISIT (OUTPATIENT)
Dept: PHYSICAL THERAPY | Facility: CLINIC | Age: 53
End: 2023-07-19
Payer: MEDICARE

## 2023-07-19 DIAGNOSIS — M54.2 CHRONIC NECK PAIN: Primary | ICD-10-CM

## 2023-07-19 DIAGNOSIS — M54.2 CERVICALGIA: ICD-10-CM

## 2023-07-19 DIAGNOSIS — G89.29 CHRONIC NECK PAIN: Primary | ICD-10-CM

## 2023-07-19 PROCEDURE — 97140 MANUAL THERAPY 1/> REGIONS: CPT

## 2023-07-19 PROCEDURE — 97110 THERAPEUTIC EXERCISES: CPT

## 2023-07-19 PROCEDURE — 97112 NEUROMUSCULAR REEDUCATION: CPT

## 2023-07-19 NOTE — PROGRESS NOTES
Daily Note     Today's date: 2023  Patient name: Leesa Merida  : 1970  MRN: [id number]  Referring provider: JHONATAN Munson  Dx:   Encounter Diagnosis     ICD-10-CM    1. Chronic neck pain  M54.2     G89.29       2. Cervicalgia  M54.2                      Subjective: Pt states her neck has been better and sx have not been as "intense"; however, states she still has a "kink" in her neck. Objective: See treatment diary below      Assessment: Tolerated treatment well. Patient exhibited good technique with therapeutic exercises and would benefit from continued PT. Good tolerance to manual techniques. TTP L cervical paraspinals, suboccipitals persists. Fatigued by end of tx session. Plan: Progress treatment as tolerated. Precautions: chronic migraines, s/p R TKA 2022, tremors       Manuals 6/7 6/15 6/22 6/28 6/29 7/5 7/13 7/19     Cervical side glides   Lower Cspine GII-III           Cervical upglides             Suboccipital stretch   ANKUSH   RE CM  5' AL  5' LM     STM L, UT     RE CM  10' AL  10' LM     Neuro Re-Ed            TB Row HEP RTB  RTB 3x10 RTB 3x10 RTB 3x10 RTB 3x10 RTB  3x10 AL  R TB  3x10 RTB 3x10     TB straight arm pull down  HEP RTB  RTB 3x10 RTB 3x10 RTB 3x10 RTB 3x10 RTB  3x10 AL  R TB  3x10 RTB 3x10     TB B ER  HEP RTB RTB 3x10 RTB 3x10 RTB 3x10 RTB 3x10 RTB  3x10 AL  R TB  3x10 RTB 3x10     Bent over horiz abd              Seated lat pull down              Serratus wall clock             Prone I              Prone W              Bent over row   3# 3x10 ea side  3# 3x10 ea side 3# 3x10 ea side 4# 3x10 ea 4# 3x10 ea. AL  4#  3x10 each 4# 3x10 ea     Bent over straight arm extension   3# 3x10 ea side 3# 3x10 ea side 3# 3x10 ea side 4# 3x10 ea 4# 3x10 ea. AL  4#  3x10 4# 3x10                  Ther Ex            UBE   33 3/3 3'/3' 3'/3' 3'/3' 3'/3' 3'/3'     SNAG   x10 ea dir (rot + ext) x10 ea dir (rot+ext) 10x ea (rot. & ext.) 10x ea  ( rot. & ext. ) 10x each (rot.  And ext)  10x ea (rot and ext)     Thoracic extension over bolster on table   10x5" 10x5" nv 5"x10 in chair 5"x10 in chair AL  In chair   5"x10 In chair 10x5"     Pec stretch at door   4x15" 4x15" 4x15" 4x15" 4x15"        Upper trap stretch   3x15" 3x15" 3x15" 3x15" AL  3x15" 3x15"      Levator stretch   3x15" 3x15" 3x15" 3x15" AL  3x15" 3x15"                                Ther Activity      7/5                                 Pt Edu       7/5       Posture, sitting position for TV, phone, and gem art  ANKUSH                          Modalities

## 2023-07-20 ENCOUNTER — PROCEDURE VISIT (OUTPATIENT)
Dept: NEUROLOGY | Facility: CLINIC | Age: 53
End: 2023-07-20
Payer: MEDICARE

## 2023-07-20 VITALS — TEMPERATURE: 98 F | HEART RATE: 76 BPM | DIASTOLIC BLOOD PRESSURE: 76 MMHG | SYSTOLIC BLOOD PRESSURE: 157 MMHG

## 2023-07-20 DIAGNOSIS — G43.709 CHRONIC MIGRAINE WITHOUT AURA WITHOUT STATUS MIGRAINOSUS, NOT INTRACTABLE: Primary | ICD-10-CM

## 2023-07-20 PROCEDURE — 64615 CHEMODENERV MUSC MIGRAINE: CPT | Performed by: PHYSICIAN ASSISTANT

## 2023-07-20 NOTE — PROGRESS NOTES
Universal Protocol   Consent: Verbal consent obtained. Written consent obtained. Risks and benefits: risks, benefits and alternatives were discussed  Consent given by: patient  Time out: Immediately prior to procedure a "time out" was called to verify the correct patient, procedure, equipment, support staff and site/side marked as required. Patient understanding: patient states understanding of the procedure being performed  Patient consent: the patient's understanding of the procedure matches consent given  Procedure consent: procedure consent matches procedure scheduled  Relevant documents: relevant documents present and verified  Patient identity confirmed: verbally with patient        Chemodenervation     Date/Time 7/20/2023 10:00 AM     Performed by  Nolvia Valentine PA-C   Authorized by Nolvia Valentine PA-C       Pre-procedure details      Prepped With: Alcohol     Anesthesia  (see MAR for exact dosages):      Anesthesia method:  None   Procedure details     Position:  Upright   Botox     Botox Type:  Type A    Brand:  Botox    mL's of Botulinum Toxin:  200    Final Concentration per CC:  50 units    Needle Gauge:  30 G 2.5 inch   Procedures     Botox Procedures: chronic headache      Indications: migraines     Injection Location      Head / Face:  L superior cervical paraspinal, R superior cervical paraspinal, L , R , L frontalis, R frontalis, L medial occipitalis, R medial occipitalis, procerus, R temporalis, L temporalis, R superior trapezius and L superior trapezius    L  injection amount:  5 unit(s)    R  injection amount:  5 unit(s)    L lateral frontalis:  5 unit(s)    R lateral frontalis:  5 unit(s)    L medial frontalis:  5 unit(s)    R medial frontalis:  5 unit(s)    L temporalis injection amount:  20 unit(s)    R temporalis injection amount:  20 unit(s)    Procerus injection amount:  5 unit(s)    L medial occipitalis injection amount:  15 unit(s)    R medial occipitalis injection amount:  15 unit(s)    L superior cervical paraspinal injection amount:  10 unit(s)    R superior cervical paraspinal injection amount:  10 unit(s)    L superior trapezius injection amount:  15 unit(s)    R superior trapezius injection amount:  15 unit(s)   Total Units     Total units used:  200    Total units discarded:  0   Post-procedure details      Chemodenervation:  Chronic migraine    Facial Nerve Location[de-identified]  Bilateral facial nerve    Patient tolerance of procedure:   Tolerated well, no immediate complications   Comments       20 units left scm  10 units occipitalis  15 units left slpenius  All medically necessary

## 2023-07-26 ENCOUNTER — OFFICE VISIT (OUTPATIENT)
Dept: PHYSICAL THERAPY | Facility: CLINIC | Age: 53
End: 2023-07-26
Payer: MEDICARE

## 2023-07-26 DIAGNOSIS — G89.29 CHRONIC NECK PAIN: Primary | ICD-10-CM

## 2023-07-26 DIAGNOSIS — M54.2 CERVICALGIA: ICD-10-CM

## 2023-07-26 DIAGNOSIS — M54.2 CHRONIC NECK PAIN: Primary | ICD-10-CM

## 2023-07-26 PROCEDURE — 97110 THERAPEUTIC EXERCISES: CPT

## 2023-07-26 PROCEDURE — 97140 MANUAL THERAPY 1/> REGIONS: CPT

## 2023-07-26 PROCEDURE — 97112 NEUROMUSCULAR REEDUCATION: CPT

## 2023-07-26 NOTE — PROGRESS NOTES
Daily Note     Today's date: 2023  Patient name: Chilango Loyd  : 1970  MRN: [id number]  Referring provider: JHONATAN Lopez  Dx:   Encounter Diagnosis     ICD-10-CM    1. Chronic neck pain  M54.2     G89.29       2. Cervicalgia  M54.2                      Subjective: Pt c/o increased incidence of headaches since botox injections last week; advised pt to make MD aware of sx. Pt states she has good and bad days, and is not 100%. Objective: See treatment diary below      Assessment: Tolerated treatment well. Patient exhibited good technique with therapeutic exercises and would benefit from continued PT. No adverse effects noted w/ manual tx. Mild fatigue following TE performed. Plan: Probable DC to [facility]P. Pt would like to return to MD to discuss other options for treatment. Precautions: chronic migraines, s/p R TKA 2022, tremors       Manuals 6/7 6/15 6/22 6/28 6/29 7/5 7/13 7/19 7/26    Cervical side glides   Lower Cspine GII-III           Cervical upglides             Suboccipital stretch   ANKUSH   RE CM  5' AL  5' LM LM    STM L, UT     RE CM  10' AL  10' LM LM    Neuro Re-Ed            TB Row HEP RTB  RTB 3x10 RTB 3x10 RTB 3x10 RTB 3x10 RTB  3x10 AL  R TB  3x10 RTB 3x10 RTB 3x10    TB straight arm pull down  HEP RTB  RTB 3x10 RTB 3x10 RTB 3x10 RTB 3x10 RTB  3x10 AL  R TB  3x10 RTB 3x10 RTB 3x10    TB B ER  HEP RTB RTB 3x10 RTB 3x10 RTB 3x10 RTB 3x10 RTB  3x10 AL  R TB  3x10 RTB 3x10 RTB 3x10    Bent over horiz abd              Seated lat pull down              Serratus wall clock             Prone I              Prone W              Bent over row   3# 3x10 ea side  3# 3x10 ea side 3# 3x10 ea side 4# 3x10 ea 4# 3x10 ea. AL  4#  3x10 each 4# 3x10 ea 4# 3x10 ea    Bent over straight arm extension   3# 3x10 ea side 3# 3x10 ea side 3# 3x10 ea side 4# 3x10 ea 4# 3x10 ea.  AL  4#  3x10 4# 3x10 4# 3x10                 Ther Ex      7/5 7/13      UBE   33 3/3 3'/3' 3'/3' 3'/3' 3'/3' 3'/3' 3'/3'    SNAG   x10 ea dir (rot + ext) x10 ea dir (rot+ext) 10x ea (rot. & ext.) 10x ea  ( rot. & ext. ) 10x each (rot.  And ext)  10x ea (rot and ext) 10x ea (rot and ext)    Thoracic extension over bolster on table   10x5" 10x5" nv 5"x10 in chair 5"x10 in chair AL  In chair   5"x10 In chair 10x5" In chair 10x5"    Pec stretch at door   4x15" 4x15" 4x15" 4x15" 4x15"        Upper trap stretch   3x15" 3x15" 3x15" 3x15" AL  3x15" 3x15"      Levator stretch   3x15" 3x15" 3x15" 3x15" AL  3x15" 3x15"                                Ther Activity      7/5                                 Pt Edu       7/5       Posture, sitting position for TV, phone, and gem art  ANKUSH                          Modalities

## 2023-07-27 ENCOUNTER — TELEPHONE (OUTPATIENT)
Dept: FAMILY MEDICINE CLINIC | Facility: CLINIC | Age: 53
End: 2023-07-27

## 2023-07-27 ENCOUNTER — APPOINTMENT (OUTPATIENT)
Dept: PHYSICAL THERAPY | Facility: CLINIC | Age: 53
End: 2023-07-27
Payer: MEDICARE

## 2023-07-27 DIAGNOSIS — M54.2 CERVICALGIA: Primary | ICD-10-CM

## 2023-07-27 NOTE — TELEPHONE ENCOUNTER
Patient wants to know the next step because she still have pain in neck and she said nothing is really working.

## 2023-07-28 ENCOUNTER — HOSPITAL ENCOUNTER (EMERGENCY)
Facility: HOSPITAL | Age: 53
Discharge: HOME | End: 2023-07-28
Attending: EMERGENCY MEDICINE
Payer: MEDICARE

## 2023-07-28 VITALS
TEMPERATURE: 97.9 F | DIASTOLIC BLOOD PRESSURE: 69 MMHG | RESPIRATION RATE: 18 BRPM | HEART RATE: 72 BPM | WEIGHT: 293 LBS | OXYGEN SATURATION: 92 % | SYSTOLIC BLOOD PRESSURE: 130 MMHG | BODY MASS INDEX: 47.33 KG/M2

## 2023-07-28 DIAGNOSIS — R06.00 DYSPNEA, UNSPECIFIED TYPE: ICD-10-CM

## 2023-07-28 DIAGNOSIS — J45.21 MILD INTERMITTENT ASTHMA WITH EXACERBATION: Primary | ICD-10-CM

## 2023-07-28 LAB
2HR DELTA HS TROPONIN: >0 NG/L
ALBUMIN SERPL BCP-MCNC: 3.7 G/DL (ref 3.5–5)
ALP SERPL-CCNC: 48 U/L (ref 34–104)
ALT SERPL W P-5'-P-CCNC: 13 U/L (ref 7–52)
ANION GAP SERPL CALCULATED.3IONS-SCNC: 5 MMOL/L
AST SERPL W P-5'-P-CCNC: 27 U/L (ref 13–39)
ATRIAL RATE: 67 BPM
BASOPHILS # BLD AUTO: 0.05 THOUSANDS/ÂΜL (ref 0–0.1)
BASOPHILS NFR BLD AUTO: 1 % (ref 0–1)
BILIRUB SERPL-MCNC: 0.27 MG/DL (ref 0.2–1)
BUN SERPL-MCNC: 15 MG/DL (ref 5–25)
CALCIUM SERPL-MCNC: 8.9 MG/DL (ref 8.4–10.2)
CARDIAC TROPONIN I PNL SERPL HS: 2 NG/L
CARDIAC TROPONIN I PNL SERPL HS: <2 NG/L
CHLORIDE SERPL-SCNC: 108 MMOL/L (ref 96–108)
CO2 SERPL-SCNC: 25 MMOL/L (ref 21–32)
CREAT SERPL-MCNC: 0.66 MG/DL (ref 0.6–1.3)
EOSINOPHIL # BLD AUTO: 0.1 THOUSAND/ÂΜL (ref 0–0.61)
EOSINOPHIL NFR BLD AUTO: 1 % (ref 0–6)
ERYTHROCYTE [DISTWIDTH] IN BLOOD BY AUTOMATED COUNT: 13.5 % (ref 11.6–15.1)
GFR SERPL CREATININE-BSD FRML MDRD: 102 ML/MIN/1.73SQ M
GLUCOSE SERPL-MCNC: 84 MG/DL (ref 65–140)
HCT VFR BLD AUTO: 42.9 % (ref 34.8–46.1)
HGB BLD-MCNC: 13.8 G/DL (ref 11.5–15.4)
IMM GRANULOCYTES # BLD AUTO: 0.12 THOUSAND/UL (ref 0–0.2)
IMM GRANULOCYTES NFR BLD AUTO: 1 % (ref 0–2)
LYMPHOCYTES # BLD AUTO: 3.38 THOUSANDS/ÂΜL (ref 0.6–4.47)
LYMPHOCYTES NFR BLD AUTO: 38 % (ref 14–44)
MCH RBC QN AUTO: 30.8 PG (ref 26.8–34.3)
MCHC RBC AUTO-ENTMCNC: 32.2 G/DL (ref 31.4–37.4)
MCV RBC AUTO: 96 FL (ref 82–98)
MONOCYTES # BLD AUTO: 1.02 THOUSAND/ÂΜL (ref 0.17–1.22)
MONOCYTES NFR BLD AUTO: 11 % (ref 4–12)
NEUTROPHILS # BLD AUTO: 4.31 THOUSANDS/ÂΜL (ref 1.85–7.62)
NEUTS SEG NFR BLD AUTO: 48 % (ref 43–75)
NRBC BLD AUTO-RTO: 0 /100 WBCS
P AXIS: 33 DEGREES
PLATELET # BLD AUTO: 176 THOUSANDS/UL (ref 149–390)
PMV BLD AUTO: 11.6 FL (ref 8.9–12.7)
POTASSIUM SERPL-SCNC: 4.8 MMOL/L (ref 3.5–5.3)
PR INTERVAL: 202 MS
PROT SERPL-MCNC: 6.7 G/DL (ref 6.4–8.4)
QRS AXIS: -5 DEGREES
QRSD INTERVAL: 88 MS
QT INTERVAL: 388 MS
QTC INTERVAL: 409 MS
RBC # BLD AUTO: 4.48 MILLION/UL (ref 3.81–5.12)
SODIUM SERPL-SCNC: 138 MMOL/L (ref 135–147)
T WAVE AXIS: 28 DEGREES
VENTRICULAR RATE: 67 BPM
WBC # BLD AUTO: 8.98 THOUSAND/UL (ref 4.31–10.16)

## 2023-07-28 PROCEDURE — 85025 COMPLETE CBC W/AUTO DIFF WBC: CPT | Performed by: EMERGENCY MEDICINE

## 2023-07-28 PROCEDURE — 99285 EMERGENCY DEPT VISIT HI MDM: CPT

## 2023-07-28 PROCEDURE — 84484 ASSAY OF TROPONIN QUANT: CPT | Performed by: EMERGENCY MEDICINE

## 2023-07-28 PROCEDURE — 36415 COLL VENOUS BLD VENIPUNCTURE: CPT | Performed by: EMERGENCY MEDICINE

## 2023-07-28 PROCEDURE — 80053 COMPREHEN METABOLIC PANEL: CPT | Performed by: EMERGENCY MEDICINE

## 2023-07-28 PROCEDURE — 99284 EMERGENCY DEPT VISIT MOD MDM: CPT | Performed by: EMERGENCY MEDICINE

## 2023-07-28 PROCEDURE — 93005 ELECTROCARDIOGRAM TRACING: CPT

## 2023-07-28 NOTE — ED PROVIDER NOTES
History  Chief Complaint   Patient presents with   • Shortness of Breath     Reports when ambulating pt becoming increased SOB. Pt has been using rescue inhaler often with minimal relief. Reports CP, received 1 nitro and 324 of aspirin by EMS. Also reports meth use every day. History provided by:  Patient  Shortness of Breath  Severity:  Moderate  Onset quality:  Gradual  Duration:  1 hour  Timing:  Constant  Progression:  Unchanged  Chronicity:  New  Context: not URI    Context comment:  Shortness of breath, history of asthma, says she had chest tightness no pain and asthma symptoms earlier today lasted about an hour took 2 puffs of her inhaler now feels improved  Relieved by: Inhaler  Worsened by:  Nothing  Associated symptoms: no abdominal pain, no chest pain ( More of a chest tightness, not true pain), no cough, no diaphoresis, no fever, no headaches, no neck pain, no rash, no sore throat, no vomiting and no wheezing        Prior to Admission Medications   Prescriptions Last Dose Informant Patient Reported? Taking?    Cholecalciferol (Vitamin D3) 125 MCG (5000 UT) CAPS  Self Yes No   Sig: Take by mouth in the morning   Diclofenac Sodium (VOLTAREN) 1 %   No No   Sig: Apply 2 g topically 3 (three) times a day as needed (pain) For pain to affected area   Diclofenac Sodium (VOLTAREN) 1 %   No No   Sig: Apply 2 g topically 4 (four) times a day   Fesoterodine Fumarate ER (Toviaz) 4 MG TB24  Self Yes No   Sig: Take 1 tablet by mouth daily   Lurasidone HCl 60 MG TABS   No No   Sig: Take 1 tablet (60 mg total) by mouth 2 (two) times a day TAKEN IN THE MORNING AND AT NIGHT-----Latuda   RABEprazole (ACIPHEX) 20 MG tablet  Self Yes No   Sig: Take 20 mg by mouth 2 (two) times a day   albuterol (Ventolin HFA) 90 mcg/act inhaler  Self No No   Sig: Inhale 2 puffs every 6 (six) hours as needed for wheezing   amLODIPine (NORVASC) 5 mg tablet   No No   Sig: TAKE 1 TABLET (5 MG TOTAL) BY MOUTH DAILY   buPROPion [facility] XL) 150 mg 24 hr tablet  Self No No   Sig: Take 1 tablet (150 mg total) by mouth daily   Patient not taking: Reported on 3/9/2023   buPROPion (WELLBUTRIN XL) 300 mg 24 hr tablet  Self Yes No   Sig: Take 300 mg by mouth daily   carBAMazepine (CARBATROL) 300 MG 12 hr capsule  Self Yes No   Sig: Take 1 capsule by mouth in the morning   carboxymethylcellulose (REFRESH PLUS) 0.5 % SOLN   Yes No   clotrimazole-betamethasone (LOTRISONE) 1-0.05 % cream   No No   Sig: Apply topically 2 (two) times a day   colestipol (COLESTID) 1 g tablet  Self No No   Sig: Take 1 tablet (1 g total) by mouth 2 (two) times a day   cyclobenzaprine (FLEXERIL) 10 mg tablet   No No   Sig: Take 1 tablet (10 mg total) by mouth daily at bedtime   cyproheptadine (PERIACTIN) 4 mg tablet   Yes No   dexamethasone (DECADRON) 1 mg tablet   Yes No   divalproex sodium (DEPAKOTE ER) 250 mg 24 hr tablet  Self Yes No   divalproex sodium (DEPAKOTE ER) 500 mg 24 hr tablet  Self Yes No   Si mg 2 (two) times a day   divalproex sodium (DEPAKOTE) 250 mg EC tablet  Self No No   Sig: Take 3 tablets (750 mg total) by mouth every 12 (twelve) hours   ergocalciferol (ERGOCALCIFEROL) 1.25 MG (74176 UT) capsule   Yes No   estradiol (ESTRACE) 0.5 MG tablet   No No   Sig: Take 1 tablet (0.5 mg total) by mouth daily   fluticasone (FLOVENT HFA) 110 MCG/ACT inhaler  Self Yes No   Sig: Inhale 2 puffs as needed Rinse mouth after use.    furosemide (LASIX) 20 mg tablet   No No   Sig: TAKE 1 TABLET BY MOUTH DAILY   furosemide (LASIX) 20 mg tablet   No No   Sig: Take 1 tablet (20 mg total) by mouth daily   haloperidol decanoate (Haldol Decanoate) 50 mg/mL injection  Self Yes No   Sig: every 30 (thirty) days   hydrOXYzine HCL (ATARAX) 25 mg tablet  Self No No   Sig: Take 1 tablet (25 mg total) by mouth every 6 (six) hours as needed for itching (mild anxiety)   hydrocortisone 2.5 % ointment  Self No No   Sig: Apply topically 2 (two) times a day   hydrocortisone 2.5 % ointment   No No   Sig: Apply topically 2 (two) times a day   levothyroxine (Euthyrox) 125 mcg tablet  Self No No   Sig: Take 1 tablet (125 mcg total) by mouth daily in the early morning   losartan (COZAAR) 50 mg tablet   No No   Sig: TAKE ONE TABLET BY MOUTH DAILY   memantine (NAMENDA) 10 mg tablet   Yes No   methocarbamol (ROBAXIN) 500 mg tablet   No No   Sig: Take 1 tablet (500 mg total) by mouth 3 (three) times a day as needed for muscle spasms   Patient not taking: Reported on 7/11/2023   metoprolol tartrate (LOPRESSOR) 25 mg tablet   No No   Sig: TAKE ONE TABLET BY MOUTH EVERY 12 HOURS   mometasone (ELOCON) 0.1 % lotion  Self No No   Sig: Apply topically daily   Patient not taking: Reported on 7/11/2023   phenazopyridine (PYRIDIUM) 100 mg tablet   No No   Sig: Take 1 tablet (100 mg total) by mouth 3 (three) times a day as needed for bladder spasms   pilocarpine (SALAGEN) 5 mg tablet  Self Yes No   Sig: Take 5 mg by mouth 2 (two) times a day   predniSONE 20 mg tablet   Yes No   rOPINIRole (REQUIP) 0.5 mg tablet  Self Yes No   Sig: Take by mouth daily at bedtime   rOPINIRole (REQUIP) 1 mg tablet  Self Yes No   topiramate (TOPAMAX) 100 mg tablet   No No   Sig: Take 1.5 tablets (150 mg total) by mouth 2 (two) times a day   topiramate (TOPAMAX) 200 MG tablet  Self Yes No   topiramate (TOPAMAX) 50 MG tablet  Self Yes No   Sig: Take 50 mg by mouth 2 (two) times a day   Patient not taking: Reported on 7/11/2023   triamcinolone (KENALOG) 0.1 % ointment   Yes No   Patient not taking: Reported on 7/11/2023   trospium chloride (SANCTURA) 20 mg tablet  Self Yes No   Sig: Take 20 mg by mouth 2 (two) times a day   venlafaxine (EFFEXOR) 37.5 mg tablet  Self Yes No   Sig: Take 37.5 mg by mouth daily      Facility-Administered Medications: None       Past Medical History:   Diagnosis Date   • Anxiety    • Arthritis     oa cassandra knees   • Asthma     good control- no medications   • Yan's esophagus    • Bipolar affective disorder ([address])    • Cannabis abuse with unspecified cannabis-induced disorder [facility])    • Chronic pain disorder     lumbar   • COPD (chronic obstructive pulmonary disease) ([facility])    • CPAP (continuous positive airway pressure) dependence    • Degenerative disc disease at L5-S1 level    • Deliberate self-cutting     9/15/22per pt has not done recently-- does see a therapist and psychiatrist regularly   • Depression 09/16/2008   • Disease of thyroid gland     hypo   • MARTINEZ (dyspnea on exertion)     per pt "has had this with exertion and not new"   • Drug overdose 10/28/2008    suicide attempt and again drug overdose 7/2022--[facility]Medical floor and than transferred to [facility]   • Dysphagia    • Dyspnea    • Edema     BLE   • Family history of blood clots    • GERD (gastroesophageal reflux disease)    • Headache(784.0)    • Headache, tension-type    • High blood pressure    • History of COVID-19 12/30/2020    Symptoms started on 12/30/2020 and then got worse. Today she is feeling a little bit better. She is a candidate for monoclonal antibody infusion and set for 1/6/21 @ 1pm at [facility]. 01/07/21 - telemedicine -    • Hyperlipidemia    • Hypertension    • Knee pain, bilateral     Especially right   • Medical marijuana use    • Memory loss    • Migraines    • Obesity    • Overactive bladder    • Sjogren's disease ([address])    • Sleep apnea    • Stress incontinence    • Suicidal ideations    • Teeth missing    • Tremor     per pt Tremors of Right Leg and both Arms   • Visual impairment    • Wears glasses        Past Surgical History:   Procedure Laterality Date   • BREAST CYST EXCISION Right 1989   • CARPAL TUNNEL RELEASE Left    • CHOLECYSTECTOMY  05/2003    Laparoscopic   • COLONOSCOPY      01/12/2009   • DILATION AND CURETTAGE OF UTERUS     • ELBOW SURGERY Left     x2.  No hardware   • ESOPHAGOGASTRODUODENOSCOPY     • FOOT SURGERY Left     Plantar fasciotomy   • HERNIA REPAIR     • HYSTERECTOMY      laporoscopic, partial   • KNEE ARTHROSCOPY Bilateral    • OOPHORECTOMY Left 10/2015   • PA GASTROCNEMIUS RECESSION Left 02/24/2021    Procedure: RECESSION GASTROC OPEN;  Surgeon: Brice Le DPM;  Location: [facility] MAIN OR;  Service: Podiatry   • PA RPR UMBILICAL HRNA 5 YRS/> REDUCIBLE N/A 04/24/2019    Procedure: REPAIR HERNIA UMBILICAL LAPAROSCOPIC W/ ROBOTICS;  Surgeon: Ruddy Gentile MD;  Location: [facility] Main OR;  Service: General   • PA SPHINCTEROTOMY ANAL DIVISION SPHINCTER SPX N/A 08/31/2018    Procedure: EUA, LEFT PARTIAL INTERNAL SPHINCTEROTOMY;  Surgeon: Lilia Villa MD;  Location: [facility] MAIN OR;  Service: Colorectal   • PA TNOT ELBOW LATERAL/MEDIAL DEBRIDE OPEN TDN RPR Left 09/20/2022    Procedure: RELEASE EPICONDYLAR ELBOW MEDIAL;  Surgeon: Justin Juárez MD;  [facility] OR;  Service: Orthopedics   • REDUCTION MAMMAPLASTY Bilateral 1999   • REPAIR LACERATION Left     left hand-5/18/2009   • REPLACEMENT TOTAL KNEE Right    • ROTATOR CUFF REPAIR Left    • TONSILECTOMY AND ADNOIDECTOMY     • US GUIDED MSK PROCEDURE  04/22/2021   • VASCULAR SURGERY     • VEIN LIGATION Bilateral    • WISDOM TOOTH EXTRACTION         Family History   Problem Relation Age of Onset   • Kidney cancer Mother    • Diabetes Mother    • Depression Mother    • Stroke Mother    • Arthritis Mother    • Cancer Mother    • Psychiatric Illness Mother    • No Known Problems Father    • No Known Problems Sister    • No Known Problems Maternal Grandmother    • No Known Problems Maternal Grandfather    • No Known Problems Paternal Grandmother    • No Known Problems Paternal Grandfather    • Colon cancer Maternal Uncle    • Colon cancer Maternal Uncle    • Colon cancer Paternal Uncle    • Colon cancer Family    • Alcohol abuse Sister    • Asthma Sister      I have reviewed and agree with the history as documented.     E-Cigarette/Vaping   • E-Cigarette Use Current Some Day User    • Comments [facility]      E-Cigarette/Vaping Substances   • Nicotine No    • THC Yes • CBD No    • Flavoring No    • Other No    • Unknown No      Social History     Tobacco Use   • Smoking status: Former     Packs/day: 2.00     Years: 30.00     Total pack years: 60.00     Types: Cigarettes     Start date: 5     Quit date: 2018     Years since quittin.5   • Smokeless tobacco: Never   • Tobacco comments:     Started at age 14. Vaping Use   • Vaping Use: Some days   • Substances: THC   Substance Use Topics   • Alcohol use: Not Currently     Comment: Recovering alcoholic   • Drug use: Yes     Types: Marijuana, Methamphetamines     Comment: Medical marijuana       Review of Systems   Constitutional: Negative for activity change, chills, diaphoresis and fever. HENT: Negative for congestion, sinus pressure and sore throat. Eyes: Negative for pain and visual disturbance. Respiratory: Positive for chest tightness and shortness of breath. Negative for cough, wheezing and stridor. Cardiovascular: Negative for chest pain ( More of a chest tightness, not true pain) and palpitations. Gastrointestinal: Negative for abdominal distention, abdominal pain, constipation, diarrhea, nausea and vomiting. Genitourinary: Negative for dysuria and frequency. Musculoskeletal: Negative for neck pain and neck stiffness. Skin: Negative for rash. Neurological: Negative for dizziness, speech difficulty, light-headedness, numbness and headaches. Physical Exam  Physical Exam  Vitals reviewed. Constitutional:       General: She is not in acute distress. Appearance: She is well-developed. She is not diaphoretic. HENT:      Head: Normocephalic and atraumatic. Right Ear: External ear normal.      Left Ear: External ear normal.      Nose: Nose normal.   Eyes:      General:         Right eye: No discharge. Left eye: No discharge. Pupils: Pupils are equal, round, and reactive to light. Neck:      Trachea: No tracheal deviation.    Cardiovascular:      Rate and Rhythm: Normal rate and regular rhythm. Heart sounds: Normal heart sounds. No murmur heard. Pulmonary:      Effort: Pulmonary effort is normal. No respiratory distress. Breath sounds: Normal breath sounds. No stridor. Comments: Clear lung fields bilaterally no wheezing no rales no rhonchi no respiratory distress  Abdominal:      General: There is no distension. Palpations: Abdomen is soft. Tenderness: There is no abdominal tenderness. There is no guarding or rebound. Musculoskeletal:         General: Normal range of motion. Cervical back: Normal range of motion and neck supple. Skin:     General: Skin is warm and dry. Coloration: Skin is not pale. Findings: No erythema. Neurological:      General: No focal deficit present. Mental Status: She is alert and oriented to person, place, and time.          Vital Signs  ED Triage Vitals [07/28/23 1132]   Temperature Pulse Respirations Blood Pressure SpO2   97.9 °F (36.6 °C) 69 20 133/73 95 %      Temp Source Heart Rate Source Patient Position - Orthostatic VS BP Location FiO2 (%)   Oral Monitor -- -- --      Pain Score       --           Vitals:    07/28/23 1132 07/28/23 1300   BP: 133/73 130/69   Pulse: 69 72         Visual Acuity      ED Medications  Medications - No data to display    Diagnostic Studies  Results Reviewed     Procedure Component Value Units Date/Time    HS Troponin I 2hr [[id number]]  (Normal) Collected: 07/28/23 1405    Lab Status: Final result Specimen: Blood from Arm, Left Updated: 07/28/23 1438     hs TnI 2hr 2 ng/L      Delta 2hr hsTnI >0 ng/L     Comprehensive metabolic panel [[id number]] Collected: 07/28/23 1251    Lab Status: Final result Specimen: Blood from Arm, Left Updated: 07/28/23 1330     Sodium 138 mmol/L      Potassium 4.8 mmol/L      Chloride 108 mmol/L      CO2 25 mmol/L      ANION GAP 5 mmol/L      BUN 15 mg/dL      Creatinine 0.66 mg/dL      Glucose 84 mg/dL      Calcium 8.9 mg/dL      AST 27 U/L      ALT 13 U/L      Alkaline Phosphatase 48 U/L      Total Protein 6.7 g/dL      Albumin 3.7 g/dL      Total Bilirubin 0.27 mg/dL      eGFR 102 ml/min/1.73sq m     Narrative:      [facility] (CKD):   •  Stage 1 with normal or high GFR (GFR > 90 mL/min/1.73 square meters)  •  Stage 2 Mild CKD (GFR = 60-89 mL/min/1.73 square meters)  •  Stage 3A Moderate CKD (GFR = 45-59 mL/min/1.73 square meters)  •  Stage 3B Moderate CKD (GFR = 30-44 mL/min/1.73 square meters)  •  Stage 4 Severe CKD (GFR = 15-29 mL/min/1.73 square meters)  •  Stage 5 End Stage CKD (GFR <15 mL/min/1.73 square meters)  Note: GFR calculation is accurate only with a steady state creatinine    HS Troponin I 4hr [[id number]]     Lab Status: No result Specimen: Blood     HS Troponin 0hr (reflex protocol) [[id number]]  (Normal) Collected: 07/28/23 1147    Lab Status: Final result Specimen: Blood from Arm, Left Updated: 07/28/23 1220     hs TnI 0hr <2 ng/L     CBC and differential [[id number]] Collected: 07/28/23 1147    Lab Status: Final result Specimen: Blood from Arm, Left Updated: 07/28/23 1158     WBC 8.98 Thousand/uL      RBC 4.48 Million/uL      Hemoglobin 13.8 g/dL      Hematocrit 42.9 %      MCV 96 fL      MCH 30.8 pg      MCHC 32.2 g/dL      RDW 13.5 %      MPV 11.6 fL      Platelets 010 Thousands/uL      nRBC 0 /100 WBCs      Neutrophils Relative 48 %      Immat GRANS % 1 %      Lymphocytes Relative 38 %      Monocytes Relative 11 %      Eosinophils Relative 1 %      Basophils Relative 1 %      Neutrophils Absolute 4.31 Thousands/µL      Immature Grans Absolute 0.12 Thousand/uL      Lymphocytes Absolute 3.38 Thousands/µL      Monocytes Absolute 1.02 Thousand/µL      Eosinophils Absolute 0.10 Thousand/µL      Basophils Absolute 0.05 Thousands/µL                  No orders to display              Procedures  ECG 12 Lead Documentation Only    Date/Time: 7/28/2023 12:03 PM    Performed by: Atif Luna DO  Authorized by: Atif Luna DO    ECG reviewed by me, the ED Provider: yes    Patient location:  ED  Interpretation:     Interpretation: normal    Rate:     ECG rate:  67    ECG rate assessment: normal    Rhythm:     Rhythm: sinus rhythm    Ectopy:     Ectopy: none    QRS:     QRS axis:  Normal    QRS intervals:  Normal  Conduction:     Conduction: normal    ST segments:     ST segments:  Normal  T waves:     T waves: normal               ED Course  ED Course as of 07/28/23 1452   Fri Jul 28, 2023   1221 Repeat evaluation still no wheezing, await delta troponin   1445 Second troponin negative.,  Will discharge                                             Medical Decision Making        Initial ED assessment: 47year-old chest tightness shortness of breath, completely improved with using albuterol inhaler    Initial DDx includes but is not limited to:   Asthma exacerbation, due to chest tightness, concern for possible ACS    Initial ED plan:   Delta troponins, not wheezing now so we will hold off on continue breathing treatments but if patient has recurrent symptoms we will reexamine and likely initiate breathing treatments        Final ED summary/disposition:   After evaluation and workup in the emergency department, troponin negative. Patient discharged    Amount and/or Complexity of Data Reviewed  Labs: ordered.           Disposition  Final diagnoses:   Dyspnea, unspecified type   Mild intermittent asthma with exacerbation     Time reflects when diagnosis was documented in both MDM as applicable and the Disposition within this note     Time User Action Codes Description Comment    7/28/2023  2:46 PM Sana BAILEY Add [R06.00] Dyspnea, unspecified type     7/28/2023  2:46 PM Sana BAILEY Add [J45.21] Mild intermittent asthma with exacerbation     7/28/2023  2:46 PM Sana BAILEY Modify [R06.00] Dyspnea, unspecified type     7/28/2023  2:46 PM Kalia Reese Modify [T59.79] Mild intermittent asthma with exacerbation       ED Disposition     ED Disposition   Discharge    Condition   Stable    Date/Time   Fri Jul 28, 2023  2:45 PM    Comment   [facility] to home/self care. Follow-up Information    None         Patient's Medications   Discharge Prescriptions    No medications on file       No discharge procedures on file.     PDMP Review       Value Time User    PDMP Reviewed  Yes 9/20/2022 10:10 AM Caterina Copeland PA-C          ED Provider  Electronically Signed by           Jasmin Neal DO  07/28/23 1453

## 2023-07-28 NOTE — Clinical Note
Haleigh Brown was seen and treated in our emergency department on 7/28/2023. No restrictions            Diagnosis:     Claris Homans  may return to work on return date. She may return on this date: 07/29/2023         If you have any questions or concerns, please don't hesitate to call.       [address] Schwartz, DO    ______________________________           _______________          _______________  Hospital Representative                              Date                                Time

## 2023-07-31 ENCOUNTER — TELEPHONE (OUTPATIENT)
Dept: FAMILY MEDICINE CLINIC | Facility: CLINIC | Age: 53
End: 2023-07-31

## 2023-07-31 DIAGNOSIS — G47.33 OSA (OBSTRUCTIVE SLEEP APNEA): Primary | Chronic | ICD-10-CM

## 2023-07-31 LAB
ATRIAL RATE: 67 BPM
P AXIS: 33 DEGREES
PR INTERVAL: 202 MS
QRS AXIS: -5 DEGREES
QRSD INTERVAL: 88 MS
QT INTERVAL: 388 MS
QTC INTERVAL: 409 MS
T WAVE AXIS: 28 DEGREES
VENTRICULAR RATE: 67 BPM

## 2023-07-31 PROCEDURE — 93010 ELECTROCARDIOGRAM REPORT: CPT | Performed by: INTERNAL MEDICINE

## 2023-07-31 NOTE — TELEPHONE ENCOUNTER
Patient is having trouble with sleep doctor would like to be referred to another doctor she can not get in with the one she has.

## 2023-08-10 ENCOUNTER — CONSULT (OUTPATIENT)
Dept: NEUROSURGERY | Facility: CLINIC | Age: 53
End: 2023-08-10
Payer: MEDICARE

## 2023-08-10 VITALS
SYSTOLIC BLOOD PRESSURE: 132 MMHG | RESPIRATION RATE: 19 BRPM | TEMPERATURE: 97.8 F | DIASTOLIC BLOOD PRESSURE: 82 MMHG | HEART RATE: 68 BPM | BODY MASS INDEX: 46.77 KG/M2 | HEIGHT: 66 IN | WEIGHT: 291 LBS | OXYGEN SATURATION: 95 %

## 2023-08-10 DIAGNOSIS — M54.2 CERVICALGIA: Primary | ICD-10-CM

## 2023-08-10 PROCEDURE — 99204 OFFICE O/P NEW MOD 45 MIN: CPT | Performed by: NURSE PRACTITIONER

## 2023-08-10 NOTE — ASSESSMENT & PLAN NOTE
Presents for evaluation of neck pain  · Follows with neurology and s/p botox for cervicogenic migraine, last on 7/20/2023. · Has been completing regular PT 3-7/2023 for cervicalgia and right hip pain. · Suffers from neck pain x many years but worsening since chiropractic manipulation in May. · Occasionally takes Flexeril with minimal relief. · Exam is non-focal. No radiculopathy or myelopathy. Plan:  · Discussed that at this point, pain is quite localized and I do not suspect any compressive etiology. · She does have thoracic kyphosis on exam and would recommend continuing PT for this. · Referral placed to pain management. · Follow up with any new or worsening symptoms.
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
## 2023-08-14 ENCOUNTER — RA CDI HCC (OUTPATIENT)
Dept: OTHER | Facility: HOSPITAL | Age: 53
End: 2023-08-14

## 2023-08-14 DIAGNOSIS — I10 HYPERTENSION, UNSPECIFIED TYPE: ICD-10-CM

## 2023-08-14 RX ORDER — FUROSEMIDE 20 MG/1
TABLET ORAL
Qty: 30 TABLET | Refills: 1 | Status: SHIPPED | OUTPATIENT
Start: 2023-08-14

## 2023-08-17 ENCOUNTER — CONSULT (OUTPATIENT)
Dept: PAIN MEDICINE | Facility: CLINIC | Age: 53
End: 2023-08-17
Payer: MEDICARE

## 2023-08-17 VITALS
DIASTOLIC BLOOD PRESSURE: 78 MMHG | SYSTOLIC BLOOD PRESSURE: 122 MMHG | HEIGHT: 66 IN | BODY MASS INDEX: 46.45 KG/M2 | WEIGHT: 289 LBS

## 2023-08-17 DIAGNOSIS — M54.2 CERVICALGIA: Primary | ICD-10-CM

## 2023-08-17 PROCEDURE — 99204 OFFICE O/P NEW MOD 45 MIN: CPT | Performed by: ANESTHESIOLOGY

## 2023-08-17 RX ORDER — AMMONIUM LACTATE 12 G/100G
CREAM TOPICAL
COMMUNITY
Start: 2023-08-02
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
## 2023-08-18 ENCOUNTER — OFFICE VISIT (OUTPATIENT)
Dept: FAMILY MEDICINE CLINIC | Facility: CLINIC | Age: 53
End: 2023-08-18
Payer: MEDICARE

## 2023-08-18 VITALS
WEIGHT: 290.6 LBS | DIASTOLIC BLOOD PRESSURE: 80 MMHG | TEMPERATURE: 97.9 F | BODY MASS INDEX: 46.9 KG/M2 | SYSTOLIC BLOOD PRESSURE: 120 MMHG | OXYGEN SATURATION: 99 % | RESPIRATION RATE: 20 BRPM | HEART RATE: 74 BPM

## 2023-08-18 DIAGNOSIS — R11.0 NAUSEA: Primary | ICD-10-CM

## 2023-08-18 DIAGNOSIS — B37.2 CANDIDIASIS OF SKIN: ICD-10-CM

## 2023-08-18 PROCEDURE — 99214 OFFICE O/P EST MOD 30 MIN: CPT

## 2023-08-18 RX ORDER — ONDANSETRON 4 MG/1
4 TABLET, ORALLY DISINTEGRATING ORAL EVERY 6 HOURS PRN
Qty: 20 TABLET | Refills: 0 | Status: SHIPPED | OUTPATIENT
Start: 2023-08-18

## 2023-08-18 RX ORDER — NYSTATIN 100000 [USP'U]/G
POWDER TOPICAL 2 TIMES DAILY
Qty: 60 G | Refills: 0 | Status: SHIPPED | OUTPATIENT
Start: 2023-08-18

## 2023-08-18 NOTE — PROGRESS NOTES
Name: Kamini Spencer      : 1970      MRN: [id number]  Encounter Provider: JHONATAN Clements  Encounter Date: 2023   Encounter department: [facility]unez     1. Nausea  -     ondansetron (ZOFRAN-ODT) 4 mg disintegrating tablet; Take 1 tablet (4 mg total) by mouth every 6 (six) hours as needed for nausea or vomiting    2. Candidiasis of skin  Comments:  Erythema and irritation underneath abdominal skin. Nystatin powder ordered. Encouraged use of pillowcase underneath abdominal fold ehile sleeping   Orders:  -     nystatin (MYCOSTATIN) powder; Apply topically 2 (two) times a day           Subjective      Presents today with nausea and diarrhea   Diarrhea has improved with use of imodium  Nausea has continued    Diarrhea   This is a new problem. The current episode started in the past 7 days. The problem occurs less than 2 times per day. The problem has been gradually improving. The stool consistency is described as watery. The patient states that diarrhea does not awaken her from sleep. Associated symptoms include abdominal pain. Pertinent negatives include no arthralgias, bloating, chills, coughing, fever, headaches, increased  flatus, myalgias, sweats, URI, vomiting or weight loss. Nothing aggravates the symptoms. There are no known risk factors. She has tried anti-motility drug for the symptoms. The treatment provided significant relief. Nausea  This is a new problem. The current episode started in the past 7 days. The problem occurs 2 to 4 times per day. The problem has been unchanged. Associated symptoms include abdominal pain and nausea. Pertinent negatives include no arthralgias, chest pain, chills, congestion, coughing, fatigue, fever, headaches, myalgias, rash, sore throat or vomiting. Nothing aggravates the symptoms. She has tried nothing for the symptoms. The treatment provided no relief.      Review of Systems   Constitutional: Negative for activity change, chills, fatigue, fever and weight loss. HENT: Negative for congestion, ear pain, rhinorrhea, sore throat and trouble swallowing. Eyes: Negative for pain and visual disturbance. Respiratory: Negative for cough, chest tightness and shortness of breath. Cardiovascular: Negative for chest pain, palpitations and leg swelling. Gastrointestinal: Positive for abdominal pain, diarrhea and nausea. Negative for bloating, constipation, flatus and vomiting. Genitourinary: Negative for difficulty urinating, dysuria, hematuria and urgency. Musculoskeletal: Negative for arthralgias, back pain and myalgias. Skin: Negative for color change and rash. Neurological: Negative for dizziness, seizures, syncope and headaches. Psychiatric/Behavioral: Negative for dysphoric mood. The patient is not nervous/anxious. All other systems reviewed and are negative.       Current Outpatient Medications on File Prior to Visit   Medication Sig   • albuterol (Ventolin HFA) 90 mcg/act inhaler Inhale 2 puffs every 6 (six) hours as needed for wheezing   • amLODIPine (NORVASC) 5 mg tablet TAKE 1 TABLET (5 MG TOTAL) BY MOUTH DAILY   • ammonium lactate (LAC-HYDRIN) 12 % cream    • buPROPion (WELLBUTRIN XL) 300 mg 24 hr tablet Take 300 mg by mouth daily   • carBAMazepine (CARBATROL) 300 MG 12 hr capsule Take 1 capsule by mouth in the morning   • Cholecalciferol (Vitamin D3) 125 MCG (5000 UT) CAPS Take by mouth in the morning   • cyclobenzaprine (FLEXERIL) 10 mg tablet Take 1 tablet (10 mg total) by mouth daily at bedtime   • cyproheptadine (PERIACTIN) 4 mg tablet    • dexamethasone (DECADRON) 1 mg tablet    • Diclofenac Sodium (VOLTAREN) 1 % Apply 2 g topically 3 (three) times a day as needed (pain) For pain to affected area   • Diclofenac Sodium (VOLTAREN) 1 % Apply 2 g topically 4 (four) times a day   • divalproex sodium (DEPAKOTE ER) 250 mg 24 hr tablet    • divalproex sodium (DEPAKOTE ER) 500 mg 24 hr tablet 500 mg 2 (two) times a day   • ergocalciferol (ERGOCALCIFEROL) 1.25 MG (20746 UT) capsule    • estradiol (ESTRACE) 0.5 MG tablet Take 1 tablet (0.5 mg total) by mouth daily   • Fesoterodine Fumarate ER (Toviaz) 4 MG TB24 Take 1 tablet by mouth daily   • fluticasone (FLOVENT HFA) 110 MCG/ACT inhaler Inhale 2 puffs as needed Rinse mouth after use.    • furosemide (LASIX) 20 mg tablet TAKE 1 TABLET BY MOUTH DAILY   • haloperidol decanoate (Haldol Decanoate) 50 mg/mL injection every 30 (thirty) days   • hydrocortisone 2.5 % ointment Apply topically 2 (two) times a day   • levothyroxine (Euthyrox) 125 mcg tablet Take 1 tablet (125 mcg total) by mouth daily in the early morning   • losartan (COZAAR) 50 mg tablet TAKE ONE TABLET BY MOUTH DAILY   • memantine (NAMENDA) 10 mg tablet    • metoprolol tartrate (LOPRESSOR) 25 mg tablet TAKE ONE TABLET BY MOUTH EVERY 12 HOURS   • phenazopyridine (PYRIDIUM) 100 mg tablet Take 1 tablet (100 mg total) by mouth 3 (three) times a day as needed for bladder spasms   • pilocarpine (SALAGEN) 5 mg tablet Take 5 mg by mouth 2 (two) times a day   • predniSONE 20 mg tablet    • RABEprazole (ACIPHEX) 20 MG tablet Take 20 mg by mouth 2 (two) times a day   • rOPINIRole (REQUIP) 0.5 mg tablet Take by mouth daily at bedtime   • rOPINIRole (REQUIP) 1 mg tablet    • topiramate (TOPAMAX) 200 MG tablet    • topiramate (TOPAMAX) 50 MG tablet Take 50 mg by mouth 2 (two) times a day   • trospium chloride (SANCTURA) 20 mg tablet Take 20 mg by mouth 2 (two) times a day   • venlafaxine (EFFEXOR) 37.5 mg tablet Take 37.5 mg by mouth daily   • buPROPion (WELLBUTRIN XL) 150 mg 24 hr tablet Take 1 tablet (150 mg total) by mouth daily   • carboxymethylcellulose (REFRESH PLUS) 0.5 % SOLN  (Patient not taking: Reported on 8/10/2023)   • clotrimazole-betamethasone (LOTRISONE) 1-0.05 % cream Apply topically 2 (two) times a day (Patient not taking: Reported on 8/10/2023)   • colestipol (COLESTID) 1 g tablet Take 1 tablet (1 g total) by mouth 2 (two) times a day   • divalproex sodium (DEPAKOTE) 250 mg EC tablet Take 3 tablets (750 mg total) by mouth every 12 (twelve) hours   • furosemide (LASIX) 20 mg tablet Take 1 tablet (20 mg total) by mouth daily (Patient not taking: Reported on 8/10/2023)   • hydrocortisone 2.5 % ointment Apply topically 2 (two) times a day (Patient not taking: Reported on 8/18/2023)   • hydrOXYzine HCL (ATARAX) 25 mg tablet Take 1 tablet (25 mg total) by mouth every 6 (six) hours as needed for itching (mild anxiety)   • Lurasidone HCl 60 MG TABS Take 1 tablet (60 mg total) by mouth 2 (two) times a day TAKEN IN THE MORNING AND AT NIGHT-----Latuda   • methocarbamol (ROBAXIN) 500 mg tablet Take 1 tablet (500 mg total) by mouth 3 (three) times a day as needed for muscle spasms (Patient not taking: Reported on 7/11/2023)   • mometasone (ELOCON) 0.1 % lotion Apply topically daily (Patient not taking: Reported on 7/11/2023)   • topiramate (TOPAMAX) 100 mg tablet Take 1.5 tablets (150 mg total) by mouth 2 (two) times a day   • triamcinolone (KENALOG) 0.1 % ointment  (Patient not taking: Reported on 7/11/2023)       Objective     /80 (BP Location: Left arm, Patient Position: Sitting, Cuff Size: Large)   Pulse 74   Temp 97.9 °F (36.6 °C) (Temporal)   Resp 20   Wt 132 kg (290 lb 9.6 oz)   SpO2 99%   BMI 46.90 kg/m²     Physical Exam  Vitals and nursing note reviewed. Constitutional:       Appearance: Normal appearance. She is normal weight. HENT:      Head: Normocephalic. Right Ear: Tympanic membrane, ear canal and external ear normal. There is no impacted cerumen. Left Ear: Tympanic membrane, ear canal and external ear normal. There is no impacted cerumen. Nose: Nose normal.      Mouth/Throat:      Mouth: Mucous membranes are moist.      Pharynx: Oropharynx is clear. Eyes:      Extraocular Movements: Extraocular movements intact.       Conjunctiva/sclera: Conjunctivae normal. Pupils: Pupils are equal, round, and reactive to light. Cardiovascular:      Rate and Rhythm: Normal rate and regular rhythm. Pulses: Normal pulses. Heart sounds: Normal heart sounds. Pulmonary:      Effort: Pulmonary effort is normal.      Breath sounds: Normal breath sounds. Abdominal:      General: Bowel sounds are normal. There is no distension. Palpations: Abdomen is soft. Tenderness: There is no abdominal tenderness. Musculoskeletal:         General: Normal range of motion. Cervical back: Normal range of motion. Skin:     General: Skin is warm. Capillary Refill: Capillary refill takes less than 2 seconds. Neurological:      General: No focal deficit present. Mental Status: She is alert and oriented to person, place, and time. Mental status is at baseline. Psychiatric:         Mood and Affect: Mood normal.         Behavior: Behavior normal.         Thought Content:  Thought content normal.         Judgment: Judgment normal.       JHONATAN Campbell

## 2023-08-22 ENCOUNTER — NURSE TRIAGE (OUTPATIENT)
Dept: OTHER | Facility: OTHER | Age: 53
End: 2023-08-22

## 2023-08-22 ENCOUNTER — APPOINTMENT (EMERGENCY)
Dept: CT IMAGING | Facility: HOSPITAL | Age: 53
End: 2023-08-22
Payer: MEDICARE

## 2023-08-22 ENCOUNTER — HOSPITAL ENCOUNTER (EMERGENCY)
Facility: HOSPITAL | Age: 53
Discharge: HOME | End: 2023-08-22
Attending: EMERGENCY MEDICINE | Admitting: EMERGENCY MEDICINE
Payer: MEDICARE

## 2023-08-22 VITALS
WEIGHT: 293 LBS | HEART RATE: 74 BPM | BODY MASS INDEX: 48.07 KG/M2 | DIASTOLIC BLOOD PRESSURE: 84 MMHG | SYSTOLIC BLOOD PRESSURE: 193 MMHG | OXYGEN SATURATION: 94 % | RESPIRATION RATE: 18 BRPM | TEMPERATURE: 98.3 F

## 2023-08-22 DIAGNOSIS — R10.84 GENERALIZED ABDOMINAL PAIN: ICD-10-CM

## 2023-08-22 DIAGNOSIS — R11.0 NAUSEA: ICD-10-CM

## 2023-08-22 DIAGNOSIS — K56.41 FECAL IMPACTION (HCC): Primary | ICD-10-CM

## 2023-08-22 LAB
ALBUMIN SERPL BCP-MCNC: 4 G/DL (ref 3.5–5)
ALP SERPL-CCNC: 55 U/L (ref 34–104)
ALT SERPL W P-5'-P-CCNC: 14 U/L (ref 7–52)
ANION GAP SERPL CALCULATED.3IONS-SCNC: 8 MMOL/L
AST SERPL W P-5'-P-CCNC: 12 U/L (ref 13–39)
BASOPHILS # BLD AUTO: 0.03 THOUSANDS/ÂΜL (ref 0–0.1)
BASOPHILS NFR BLD AUTO: 0 % (ref 0–1)
BILIRUB SERPL-MCNC: 0.28 MG/DL (ref 0.2–1)
BUN SERPL-MCNC: 13 MG/DL (ref 5–25)
CALCIUM SERPL-MCNC: 9 MG/DL (ref 8.4–10.2)
CHLORIDE SERPL-SCNC: 107 MMOL/L (ref 96–108)
CO2 SERPL-SCNC: 26 MMOL/L (ref 21–32)
CREAT SERPL-MCNC: 0.69 MG/DL (ref 0.6–1.3)
EOSINOPHIL # BLD AUTO: 0.1 THOUSAND/ÂΜL (ref 0–0.61)
EOSINOPHIL NFR BLD AUTO: 1 % (ref 0–6)
ERYTHROCYTE [DISTWIDTH] IN BLOOD BY AUTOMATED COUNT: 13.2 % (ref 11.6–15.1)
GFR SERPL CREATININE-BSD FRML MDRD: 101 ML/MIN/1.73SQ M
GLUCOSE SERPL-MCNC: 84 MG/DL (ref 65–140)
HCT VFR BLD AUTO: 46.1 % (ref 34.8–46.1)
HGB BLD-MCNC: 14.4 G/DL (ref 11.5–15.4)
IMM GRANULOCYTES # BLD AUTO: 0.08 THOUSAND/UL (ref 0–0.2)
IMM GRANULOCYTES NFR BLD AUTO: 1 % (ref 0–2)
LACTATE SERPL-SCNC: 1.6 MMOL/L (ref 0.5–2)
LIPASE SERPL-CCNC: 20 U/L (ref 11–82)
LYMPHOCYTES # BLD AUTO: 2.65 THOUSANDS/ÂΜL (ref 0.6–4.47)
LYMPHOCYTES NFR BLD AUTO: 28 % (ref 14–44)
MCH RBC QN AUTO: 30.4 PG (ref 26.8–34.3)
MCHC RBC AUTO-ENTMCNC: 31.2 G/DL (ref 31.4–37.4)
MCV RBC AUTO: 97 FL (ref 82–98)
MONOCYTES # BLD AUTO: 0.94 THOUSAND/ÂΜL (ref 0.17–1.22)
MONOCYTES NFR BLD AUTO: 10 % (ref 4–12)
NEUTROPHILS # BLD AUTO: 5.71 THOUSANDS/ÂΜL (ref 1.85–7.62)
NEUTS SEG NFR BLD AUTO: 60 % (ref 43–75)
NRBC BLD AUTO-RTO: 0 /100 WBCS
PLATELET # BLD AUTO: 149 THOUSANDS/UL (ref 149–390)
PMV BLD AUTO: 10.8 FL (ref 8.9–12.7)
POTASSIUM SERPL-SCNC: 4.5 MMOL/L (ref 3.5–5.3)
PROT SERPL-MCNC: 7.1 G/DL (ref 6.4–8.4)
RBC # BLD AUTO: 4.74 MILLION/UL (ref 3.81–5.12)
SODIUM SERPL-SCNC: 141 MMOL/L (ref 135–147)
WBC # BLD AUTO: 9.51 THOUSAND/UL (ref 4.31–10.16)

## 2023-08-22 PROCEDURE — 96361 HYDRATE IV INFUSION ADD-ON: CPT

## 2023-08-22 PROCEDURE — 99285 EMERGENCY DEPT VISIT HI MDM: CPT

## 2023-08-22 PROCEDURE — 80053 COMPREHEN METABOLIC PANEL: CPT

## 2023-08-22 PROCEDURE — 99285 EMERGENCY DEPT VISIT HI MDM: CPT | Performed by: EMERGENCY MEDICINE

## 2023-08-22 PROCEDURE — 83605 ASSAY OF LACTIC ACID: CPT

## 2023-08-22 PROCEDURE — 83690 ASSAY OF LIPASE: CPT

## 2023-08-22 PROCEDURE — 36415 COLL VENOUS BLD VENIPUNCTURE: CPT

## 2023-08-22 PROCEDURE — 96375 TX/PRO/DX INJ NEW DRUG ADDON: CPT

## 2023-08-22 PROCEDURE — 96374 THER/PROPH/DIAG INJ IV PUSH: CPT

## 2023-08-22 PROCEDURE — 74177 CT ABD & PELVIS W/CONTRAST: CPT

## 2023-08-22 PROCEDURE — 85025 COMPLETE CBC W/AUTO DIFF WBC: CPT

## 2023-08-22 PROCEDURE — G1004 CDSM NDSC: HCPCS

## 2023-08-22 RX ORDER — HYDROMORPHONE HCL/PF 1 MG/ML
0.5 SYRINGE (ML) INJECTION ONCE
Status: DISCONTINUED | OUTPATIENT
Start: 2023-08-22 | End: 2023-08-22

## 2023-08-22 RX ORDER — KETOROLAC TROMETHAMINE 30 MG/ML
15 INJECTION, SOLUTION INTRAMUSCULAR; INTRAVENOUS ONCE
Status: COMPLETED | OUTPATIENT
Start: 2023-08-22 | End: 2023-08-22

## 2023-08-22 RX ORDER — ACETAMINOPHEN 325 MG/1
650 TABLET ORAL ONCE
Status: COMPLETED | OUTPATIENT
Start: 2023-08-22 | End: 2023-08-22

## 2023-08-22 RX ORDER — ONDANSETRON 2 MG/ML
4 INJECTION INTRAMUSCULAR; INTRAVENOUS ONCE
Status: COMPLETED | OUTPATIENT
Start: 2023-08-22 | End: 2023-08-22

## 2023-08-22 RX ADMIN — IOHEXOL 100 ML: 350 INJECTION, SOLUTION INTRAVENOUS at 04:05

## 2023-08-22 RX ADMIN — SODIUM CHLORIDE 1000 ML: 0.9 INJECTION, SOLUTION INTRAVENOUS at 03:23

## 2023-08-22 RX ADMIN — KETOROLAC TROMETHAMINE 15 MG: 30 INJECTION, SOLUTION INTRAMUSCULAR; INTRAVENOUS at 05:24

## 2023-08-22 RX ADMIN — ONDANSETRON 4 MG: 2 INJECTION INTRAMUSCULAR; INTRAVENOUS at 03:22

## 2023-08-22 RX ADMIN — ACETAMINOPHEN 650 MG: 325 TABLET, FILM COATED ORAL at 05:24

## 2023-08-22 NOTE — ED PROVIDER NOTES
History  Chief Complaint   Patient presents with   • Abdominal Pain     Pt c/o nausea/diarrhea x2 weeks, then constipation over the weekend. States she has rectal pain starting yesterday. Denies blood in stool, fevers at home. Last bowel movement this morning. Last zofran at 2100 last night. Pt denies SI/HI/hallucinations. Self-inflicted cuts to left wrist noted. Pt states therapist is aware. Patient is a 50-year-old female with PMH of HTN, HLD, GERD, Yan's esophagus, asthma, COPD, anxiety/depression, bipolar disorder, and substance abuse (prior meth use) presenting for evaluation of 2 weeks of nausea and 1 day of rectal pressure. The patient notes over the last 2 weeks having mild nausea 2-4 times daily for which she saw primary care 4 days ago and was prescribed ondansetron. She has been taking this new prescription and notes it relieves her nausea, however the nausea seems to return. She notes having loose bowel movements at baseline. She notes typically having a normal bowel in the morning preceded by 2-3 looser stools throughout the day. She notes a slight increase in this loose stool over the last 2 weeks with a total of 4-5 BMs daily (denies blood or melena) for which she has been taking Imodium with relief of her symptoms. However, starting 3 days ago she had an abrupt cessation of her stools. Over the last day she has developed lower abdominal discomfort and a strong pressure-like sensation in her rectum and has been unable to stool. She denies recent illnesses, fevers, chills, CP/SOB, vomiting, and urinary complaints. History provided by:  Patient   used: No        Prior to Admission Medications   Prescriptions Last Dose Informant Patient Reported? Taking?    Cholecalciferol (Vitamin D3) 125 MCG (5000 UT) CAPS  Self Yes No   Sig: Take by mouth in the morning   Diclofenac Sodium (VOLTAREN) 1 %   No No   Sig: Apply 2 g topically 3 (three) times a day as needed (pain) For pain to affected area   Diclofenac Sodium (VOLTAREN) 1 %   No No   Sig: Apply 2 g topically 4 (four) times a day   Fesoterodine Fumarate ER (Toviaz) 4 MG TB24  Self Yes No   Sig: Take 1 tablet by mouth daily   Lurasidone HCl 60 MG TABS   No No   Sig: Take 1 tablet (60 mg total) by mouth 2 (two) times a day TAKEN IN THE MORNING AND AT NIGHT-----Latuda   RABEprazole (ACIPHEX) 20 MG tablet  Self Yes No   Sig: Take 20 mg by mouth 2 (two) times a day   albuterol (Ventolin HFA) 90 mcg/act inhaler  Self No No   Sig: Inhale 2 puffs every 6 (six) hours as needed for wheezing   amLODIPine (NORVASC) 5 mg tablet   No No   Sig: TAKE 1 TABLET (5 MG TOTAL) BY MOUTH DAILY   ammonium lactate (LAC-HYDRIN) 12 % cream   Yes No   buPROPion (WELLBUTRIN XL) 150 mg 24 hr tablet  Self No No   Sig: Take 1 tablet (150 mg total) by mouth daily   buPROPion (WELLBUTRIN XL) 300 mg 24 hr tablet  Self Yes No   Sig: Take 300 mg by mouth daily   carBAMazepine (CARBATROL) 300 MG 12 hr capsule  Self Yes No   Sig: Take 1 capsule by mouth in the morning   carboxymethylcellulose (REFRESH PLUS) 0.5 % SOLN   Yes No   Patient not taking: Reported on 8/10/2023   clotrimazole-betamethasone (LOTRISONE) 1-0.05 % cream   No No   Sig: Apply topically 2 (two) times a day   Patient not taking: Reported on 8/10/2023   colestipol (COLESTID) 1 g tablet  Self No No   Sig: Take 1 tablet (1 g total) by mouth 2 (two) times a day   cyclobenzaprine (FLEXERIL) 10 mg tablet   No No   Sig: Take 1 tablet (10 mg total) by mouth daily at bedtime   cyproheptadine (PERIACTIN) 4 mg tablet   Yes No   dexamethasone (DECADRON) 1 mg tablet  Self Yes No   divalproex sodium (DEPAKOTE ER) 250 mg 24 hr tablet  Self Yes No   divalproex sodium (DEPAKOTE ER) 500 mg 24 hr tablet  Self Yes No   Si mg 2 (two) times a day   divalproex sodium (DEPAKOTE) 250 mg EC tablet  Self No No   Sig: Take 3 tablets (750 mg total) by mouth every 12 (twelve) hours   ergocalciferol (ERGOCALCIFEROL) 1.25 MG (89258 UT) capsule   Yes No   estradiol (ESTRACE) 0.5 MG tablet   No No   Sig: Take 1 tablet (0.5 mg total) by mouth daily   fluticasone (FLOVENT HFA) 110 MCG/ACT inhaler  Self Yes No   Sig: Inhale 2 puffs as needed Rinse mouth after use.    furosemide (LASIX) 20 mg tablet   No No   Sig: Take 1 tablet (20 mg total) by mouth daily   Patient not taking: Reported on 8/10/2023   furosemide (LASIX) 20 mg tablet   No No   Sig: TAKE 1 TABLET BY MOUTH DAILY   haloperidol decanoate (Haldol Decanoate) 50 mg/mL injection  Self Yes No   Sig: every 30 (thirty) days   hydrOXYzine HCL (ATARAX) 25 mg tablet  Self No No   Sig: Take 1 tablet (25 mg total) by mouth every 6 (six) hours as needed for itching (mild anxiety)   hydrocortisone 2.5 % ointment  Self No No   Sig: Apply topically 2 (two) times a day   hydrocortisone 2.5 % ointment   No No   Sig: Apply topically 2 (two) times a day   Patient not taking: Reported on 8/18/2023   levothyroxine (Euthyrox) 125 mcg tablet  Self No No   Sig: Take 1 tablet (125 mcg total) by mouth daily in the early morning   losartan (COZAAR) 50 mg tablet   No No   Sig: TAKE ONE TABLET BY MOUTH DAILY   memantine (NAMENDA) 10 mg tablet   Yes No   methocarbamol (ROBAXIN) 500 mg tablet   No No   Sig: Take 1 tablet (500 mg total) by mouth 3 (three) times a day as needed for muscle spasms   Patient not taking: Reported on 7/11/2023   metoprolol tartrate (LOPRESSOR) 25 mg tablet   No No   Sig: TAKE ONE TABLET BY MOUTH EVERY 12 HOURS   mometasone (ELOCON) 0.1 % lotion  Self No No   Sig: Apply topically daily   Patient not taking: Reported on 7/11/2023   nystatin (MYCOSTATIN) powder   No No   Sig: Apply topically 2 (two) times a day   ondansetron (ZOFRAN-ODT) 4 mg disintegrating tablet   No No   Sig: Take 1 tablet (4 mg total) by mouth every 6 (six) hours as needed for nausea or vomiting   phenazopyridine (PYRIDIUM) 100 mg tablet   No No   Sig: Take 1 tablet (100 mg total) by mouth 3 (three) times a day as needed for bladder spasms   pilocarpine (SALAGEN) 5 mg tablet  Self Yes No   Sig: Take 5 mg by mouth 2 (two) times a day   predniSONE 20 mg tablet   Yes No   rOPINIRole (REQUIP) 0.5 mg tablet  Self Yes No   Sig: Take by mouth daily at bedtime   rOPINIRole (REQUIP) 1 mg tablet  Self Yes No   topiramate (TOPAMAX) 100 mg tablet   No No   Sig: Take 1.5 tablets (150 mg total) by mouth 2 (two) times a day   topiramate (TOPAMAX) 200 MG tablet  Self Yes No   topiramate (TOPAMAX) 50 MG tablet  Self Yes No   Sig: Take 50 mg by mouth 2 (two) times a day   triamcinolone (KENALOG) 0.1 % ointment   Yes No   Patient not taking: Reported on 7/11/2023   trospium chloride (SANCTURA) 20 mg tablet  Self Yes No   Sig: Take 20 mg by mouth 2 (two) times a day   venlafaxine (EFFEXOR) 37.5 mg tablet  Self Yes No   Sig: Take 37.5 mg by mouth daily      Facility-Administered Medications: None       Past Medical History:   Diagnosis Date   • Anxiety    • Arthritis     oa cassandra knees   • Asthma     good control- no medications   • Yan's esophagus    • Bipolar affective disorder ([address])    • Cannabis abuse with unspecified 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HCC)    • Chronic pain disorder     lumbar   • COPD (chronic obstructive pulmonary disease) (HCC)    • CPAP (continuous positive airway pressure) dependence    • Degenerative disc disease at L5-S1 level    • Deliberate self-cutting     9/15/22per pt has not done recently-- does see a therapist and psychiatrist regularly   • Depression 09/16/2008   • Disease of thyroid gland     hypo   • MARTINEZ (dyspnea on exertion)     per pt "has had this with exertion and not new"   • Drug overdose 10/28/2008    suicide attempt and again drug overdose 7/2022--[facility] and than transferred to [facility]   • Dysphagia    • Dyspnea    • Edema     BLE   • Family history of blood clots    • GERD (gastroesophageal reflux disease)    • Headache(784.0)    • Headache, tension-type    • High blood pressure    • History of COVID-19 12/30/2020    Symptoms started on 12/30/2020 and then got worse. Today she is feeling a little bit better. She is a candidate for monoclonal antibody infusion and set for 1/6/21 @ 1pm at [facility]. 01/07/21 - telemedicine -    • Hyperlipidemia    • Hypertension    • Knee pain, bilateral     Especially right   • Medical marijuana use    • Memory loss    • Migraines    • Obesity    • Overactive bladder    • Sjogren's disease ([address])    • Sleep apnea    • Stress incontinence    • Suicidal ideations    • Teeth missing    • Tremor     per pt Tremors of Right Leg and both Arms   • Visual impairment    • Wears glasses        Past Surgical History:   Procedure Laterality Date   • BREAST CYST EXCISION Right 1989   • CARPAL TUNNEL RELEASE Left    • CHOLECYSTECTOMY  05/2003    Laparoscopic   • COLONOSCOPY      01/12/2009   • DILATION AND CURETTAGE OF UTERUS     • ELBOW SURGERY Left     x2.  No hardware   • ESOPHAGOGASTRODUODENOSCOPY     • FOOT SURGERY Left     Plantar fasciotomy   • HERNIA REPAIR     • HYSTERECTOMY      laporoscopic, partial   • KNEE ARTHROSCOPY Bilateral    • OOPHORECTOMY Left 10/2015   • DC GASTROCNEMIUS RECESSION Left 02/24/2021    Procedure: RECESSION GASTROC OPEN;  Surgeon: Marlon Armstrong DPM;  Location: [address] [place] [address] MAIN OR;  Service: Podiatry   • DC RPR UMBILICAL HRNA 5 YRS/> REDUCIBLE N/A 04/24/2019    Procedure: REPAIR HERNIA UMBILICAL LAPAROSCOPIC W/ ROBOTICS;  Surgeon: Adriel Lemos MD;  Location: [facility] Main OR;  Service: General   • DC SPHINCTEROTOMY ANAL DIVISION SPHINCTER [facility] N/A 08/31/2018    Procedure: EUA, LEFT PARTIAL INTERNAL SPHINCTEROTOMY;  Surgeon: Palma Azul MD;  Location: [address] [place] [address] MAIN OR;  Service: Colorectal   • DC TNOT ELBOW LATERAL/MEDIAL DEBRIDE OPEN TDN RPR Left 09/20/2022    Procedure: RELEASE EPICONDYLAR ELBOW MEDIAL;  Surgeon: Satya Ash MD;  Location: [facility] MAIN OR;  Service: Orthopedics   • REDUCTION MAMMAPLASTY Bilateral 1999   • REPAIR LACERATION Left left hand-2009   • REPLACEMENT TOTAL KNEE Right    • ROTATOR CUFF REPAIR Left    • TONSILECTOMY AND ADNOIDECTOMY     • US GUIDED MSK PROCEDURE  2021   • VASCULAR SURGERY     • VEIN LIGATION Bilateral    • WISDOM TOOTH EXTRACTION         Family History   Problem Relation Age of Onset   • Kidney cancer Mother    • Diabetes Mother    • Depression Mother    • Stroke Mother    • Arthritis Mother    • Cancer Mother    • Psychiatric Illness Mother    • No Known Problems Father    • No Known Problems Sister    • No Known Problems Maternal Grandmother    • No Known Problems Maternal Grandfather    • No Known Problems Paternal Grandmother    • No Known Problems Paternal Grandfather    • Colon cancer Maternal Uncle    • Colon cancer Maternal Uncle    • Colon cancer Paternal Uncle    • Colon cancer Family    • Alcohol abuse Sister    • Asthma Sister      I have reviewed and agree with the history as documented. E-Cigarette/Vaping   • E-Cigarette Use Current Some Day User    • Comments medical THC      E-Cigarette/Vaping Substances   • Nicotine No    • THC Yes    • CBD No    • Flavoring No    • Other No    • Unknown No      Social History     Tobacco Use   • Smoking status: Former     Packs/day: 2.00     Years: 30.00     Total pack years: 60.00     Types: Cigarettes     Start date: 5     Quit date: 2018     Years since quittin.6   • Smokeless tobacco: Never   • Tobacco comments:     Started at age 14. Vaping Use   • Vaping Use: Some days   • Substances: THC   Substance Use Topics   • Alcohol use: Not Currently     Comment: Recovering alcoholic   • Drug use: Yes     Types: Marijuana, Methamphetamines     Comment: Medical marijuana       Review of Systems   Constitutional: Positive for appetite change (Decreased x2 weeks). Negative for chills, diaphoresis, fatigue, fever and unexpected weight change. HENT: Negative for congestion, sore throat and trouble swallowing.     Respiratory: Negative for cough and shortness of breath. Cardiovascular: Negative for chest pain, palpitations and leg swelling. Gastrointestinal: Positive for abdominal distention (Feels more bloated than normal), abdominal pain (Currently denies, notes generalized abdominal aching intermittently x2 weeks), constipation (X3 days), diarrhea (X2 weeks, has not had any bowel movement in the last 3 days), nausea and rectal pain (Described as rectal pressure). Negative for anal bleeding, blood in stool and vomiting. Genitourinary: Negative. Musculoskeletal: Negative for back pain and gait problem. Skin: Positive for wound (Self-inflicted wounds to left wrist). Negative for color change, pallor and rash. Neurological: Negative for dizziness, syncope, weakness, light-headedness and headaches. All other systems reviewed and are negative. Physical Exam  Physical Exam  Vitals and nursing note reviewed. Constitutional:       General: She is awake. She is in acute distress (Evidencing moderate discomfort, positioning self in bed on right side-lying, clenching butt cheeks together, evidencing mild distress). Appearance: Normal appearance. She is well-developed. She is obese. She is not ill-appearing, toxic-appearing or diaphoretic. HENT:      Head: Normocephalic and atraumatic. Jaw: There is normal jaw occlusion. Nose: Nose normal.      Mouth/Throat:      Lips: Pink. No lesions. Mouth: Mucous membranes are moist.      Pharynx: Oropharynx is clear. Uvula midline. Eyes:      General: Lids are normal. Vision grossly intact. Gaze aligned appropriately. Extraocular Movements: Extraocular movements intact. Conjunctiva/sclera: Conjunctivae normal.      Pupils: Pupils are equal, round, and reactive to light. Neck:      Trachea: Phonation normal. No abnormal tracheal secretions. Cardiovascular:      Rate and Rhythm: Normal rate.       Pulses:           Radial pulses are 2+ on the right side and 2+ on the left side. Dorsalis pedis pulses are 2+ on the right side and 2+ on the left side. Heart sounds: Normal heart sounds, S1 normal and S2 normal. No murmur heard. Pulmonary:      Effort: Pulmonary effort is normal. No tachypnea or respiratory distress. Breath sounds: Normal breath sounds and air entry. No stridor, decreased air movement or transmitted upper airway sounds. No decreased breath sounds. Abdominal:      General: Bowel sounds are normal. There is no abdominal bruit. Palpations: Abdomen is soft. There is no shifting dullness or pulsatile mass. Tenderness: There is abdominal tenderness (Very mild tenderness) in the right lower quadrant, suprapubic area and left lower quadrant. There is no right CVA tenderness, left CVA tenderness, guarding or rebound. Negative signs include Lopez's sign. Comments: Rounded abdomen   Genitourinary:     Rectum: Guaiac result positive. Tenderness present. No mass, anal fissure, external hemorrhoid or internal hemorrhoid. Normal anal tone. Comments: Firm, rounded stool ball noted to rectal vault on digital examination. Unable to digitally disimpact. Plan made with patient for enema. Musculoskeletal:         General: Normal range of motion. Cervical back: Normal range of motion and neck supple. Right lower leg: No edema. Left lower leg: No edema. Comments: MAYS, 5/5 strength throughout, sensation intact, no focal joint swelling. Ambulatory with steady gait. Skin:     General: Skin is warm and dry. Capillary Refill: Capillary refill takes less than 2 seconds. Findings: Wound (Linear, superficial abrasions x2 to left wrist.  No evidence of surrounding erythema or warmth. Patient notes they are self-inflicted from a "few days ago". ) present. No rash. Neurological:      General: No focal deficit present. Mental Status: She is alert and oriented to person, place, and time. Mental status is at baseline. GCS: GCS eye subscore is 4. GCS verbal subscore is 5. GCS motor subscore is 6. Psychiatric:         Mood and Affect: Mood is anxious. Behavior: Behavior is cooperative. Thought Content: Thought content is not delusional. Thought content does not include homicidal or suicidal ideation. Thought content does not include homicidal or suicidal plan.          Vital Signs  ED Triage Vitals [08/22/23 0143]   Temperature Pulse Respirations Blood Pressure SpO2   98.3 °F (36.8 °C) 84 20 (!) 174/96 95 %      Temp Source Heart Rate Source Patient Position - Orthostatic VS BP Location FiO2 (%)   Oral Monitor Sitting Right arm --      Pain Score       8           Vitals:    08/22/23 0143 08/22/23 0145 08/22/23 0230 08/22/23 0529   BP: (!) 174/96 (!) 174/96 135/69 (!) 193/84   Pulse: 84 77 72 74   Patient Position - Orthostatic VS: Sitting Sitting Lying Lying         Visual Acuity      ED Medications  Medications   sodium chloride 0.9 % bolus 1,000 mL (0 mL Intravenous Stopped 8/22/23 0423)   ondansetron (ZOFRAN) injection 4 mg (4 mg Intravenous Given 8/22/23 0322)   iohexol (OMNIPAQUE) 350 MG/ML injection (MULTI-DOSE) 100 mL (100 mL Intravenous Given 8/22/23 0405)   ketorolac (TORADOL) injection 15 mg (15 mg Intravenous Given 8/22/23 0524)   acetaminophen (TYLENOL) tablet 650 mg (650 mg Oral Given 8/22/23 0524)       Diagnostic Studies  Results Reviewed     Procedure Component Value Units Date/Time    CBC and differential [[id number]]  (Abnormal) Collected: 08/22/23 0315    Lab Status: Final result Specimen: Blood from Arm, Right Updated: 08/22/23 0444     WBC 9.51 Thousand/uL      RBC 4.74 Million/uL      Hemoglobin 14.4 g/dL      Hematocrit 46.1 %      MCV 97 fL      MCH 30.4 pg      MCHC 31.2 g/dL      RDW 13.2 %      MPV 10.8 fL      Platelets 958 Thousands/uL      nRBC 0 /100 WBCs      Neutrophils Relative 60 %      Immat GRANS % 1 %      Lymphocytes Relative 28 %      Monocytes Relative 10 % Eosinophils Relative 1 %      Basophils Relative 0 %      Neutrophils Absolute 5.71 Thousands/µL      Immature Grans Absolute 0.08 Thousand/uL      Lymphocytes Absolute 2.65 Thousands/µL      Monocytes Absolute 0.94 Thousand/µL      Eosinophils Absolute 0.10 Thousand/µL      Basophils Absolute 0.03 Thousands/µL     Lipase [[id number]]  (Normal) Collected: 08/22/23 0315    Lab Status: Final result Specimen: Blood from Arm, Right Updated: 08/22/23 0349     Lipase 20 u/L     Lactic acid, plasma (w/reflex if result > 2.0) [[id number]]  (Normal) Collected: 08/22/23 0315    Lab Status: Final result Specimen: Blood from Arm, Right Updated: 08/22/23 0349     LACTIC ACID 1.6 mmol/L     Narrative:      Result may be elevated if tourniquet was used during collection.     Comprehensive metabolic panel [[id number]]  (Abnormal) Collected: 08/22/23 0315    Lab Status: Final result Specimen: Blood from Arm, Right Updated: 08/22/23 0349     Sodium 141 mmol/L      Potassium 4.5 mmol/L      Chloride 107 mmol/L      CO2 26 mmol/L      ANION GAP 8 mmol/L      BUN 13 mg/dL      Creatinine 0.69 mg/dL      Glucose 84 mg/dL      Calcium 9.0 mg/dL      AST 12 U/L      ALT 14 U/L      Alkaline Phosphatase 55 U/L      Total Protein 7.1 g/dL      Albumin 4.0 g/dL      Total Bilirubin 0.28 mg/dL      eGFR 101 ml/min/1.73sq m     Narrative:      [facility] guidelines for Chronic Kidney Disease (CKD):   •  Stage 1 with normal or high GFR (GFR > 90 mL/min/1.73 square meters)  •  Stage 2 Mild CKD (GFR = 60-89 mL/min/1.73 square meters)  •  Stage 3A Moderate CKD (GFR = 45-59 mL/min/1.73 square meters)  •  Stage 3B Moderate CKD (GFR = 30-44 mL/min/1.73 square meters)  •  Stage 4 Severe CKD (GFR = 15-29 mL/min/1.73 square meters)  •  Stage 5 End Stage CKD (GFR <15 mL/min/1.73 square meters)  Note: GFR calculation is accurate only with a steady state creatinine                 CT abdomen pelvis with contrast   Final Result by Jayro Deras Finesse Mcclain MD (08/22 7949)      No acute intra-abdominal abnormality. No free air or free fluid. Normal appendix visualized. Workstation performed: [id number]                    Procedures  Procedures         ED Course  ED Course as of 08/22/23 0923   Tue Aug 22, 2023   0158 Triage vital signs with elevated BP of 174/96, all other vital signs within normal limits and stable   0352 LACTIC ACID: 1.6  WNL   0353 Comprehensive metabolic panel(!)  No electrolyte derangement, no MINNIE, normal random glucose, no transaminitis or elevated total bilirubin to suggest acute hepatobiliary pathology   0353 Lipase: 20  Acute pancreatitis less likely   0439 CT abdomen pelvis with contrast  FINDINGS:     ABDOMEN     LOWER CHEST:  No clinically significant abnormality identified in the visualized lower chest.     LIVER/BILIARY TREE:  Unremarkable.     GALLBLADDER:  Gallbladder is surgically absent.     SPLEEN:  Unremarkable.     PANCREAS:  Unremarkable.     ADRENAL GLANDS:  Unremarkable.     KIDNEYS/URETERS:  No hydronephrosis or urinary tract calculus. One or more sharply circumscribed subcentimeter renal hypodensities are present, too small to accurately characterize, and statistically most likely benign findings. According to recent   literature (Radiology 2019) no further workup of these findings is recommended.     STOMACH AND BOWEL:  Unremarkable.     APPENDIX:  A normal appendix was visualized.     ABDOMINOPELVIC CAVITY:  No ascites. No pneumoperitoneum. No lymphadenopathy.     VESSELS:  Unremarkable for patient's age.     PELVIS     REPRODUCTIVE ORGANS:  Unremarkable for patient's age.     URINARY BLADDER:  Unremarkable.     ABDOMINAL WALL/INGUINAL REGIONS:  Unremarkable.     OSSEOUS STRUCTURES:  No acute fracture or destructive osseous lesion.     IMPRESSION:     No acute intra-abdominal abnormality. No free air or free fluid.     Normal appendix visualized.    0507 CBC and differential(!)  No leukocytosis or gross anemia   0540 Patient passed p.o. challenge without difficulty. She notes overall she feels much improved. Plan for stool softener as needed and close follow-up with PCP. Patient in agreement with plan and has no further questions at this time. She is with improved appearance, in no acute distress, and ambulatory with steady gait at time discharge. Medical Decision Making  DDx including but not limited to: Constipation, obstipation, pseudo-obstruction, fecal impaction, bowel obstruction    #Fecal impaction  -Hard firm stool in rectal vault on digital exam  -Enema administered and patient passed 2 large balls of stool. She notes the pain in her rectum is now gone. She notes ongoing slight burning/mild discomfort at the rectum but that it is vastly improved. #Nausea and lower abd pain  -Blood work reviewed and within normal limits. -CT imaging stable without acute infectious or surgical intra-abdominal pathology.  -I discussed obtaining UA, however patient defers need for this testing as she has no urinary tract symptoms and overall feels markedly improved after having bowel movement in the ED. -Given IV Zofran and had vast improvement in symptoms. P.o. challenge initiated and passed without difficulty. Fecal impaction [facility]): acute illness or injury  Generalized abdominal pain: acute illness or injury  Nausea: acute illness or injury  Amount and/or Complexity of Data Reviewed  Labs: ordered. Decision-making details documented in ED Course. Radiology: ordered. Decision-making details documented in ED Course. Risk  OTC drugs. Prescription drug management.           Disposition  Final diagnoses:   Fecal impaction ([address])   Generalized abdominal pain   Nausea     Time reflects when diagnosis was documented in both MDM as applicable and the Disposition within this note     Time User Action Codes Description Comment    8/22/2023  5:33 AM June Aguirre Add [K56.41] Fecal impaction ([address])     8/22/2023  5:33 AM Se Sea Add [R10.84] Generalized abdominal pain     8/22/2023  5:33 AM Se Sea Add [R11.0] Nausea       ED Disposition     ED Disposition   Discharge    Condition   Stable    Date/Time   Tue Aug 22, 2023  5:33 AM    Comment   [facility] discharge to home/self care.                Follow-up Information     Follow up With Specialties Details Why Contact Info    Angela Fraser, [address] [place] [address] Nurse Practitioner Schedule an appointment as soon as possible for a visit in 2 days  [address] [place], [place] [id number] [address]  [phone]            Discharge Medication List as of 8/22/2023  5:36 AM      CONTINUE these medications which have NOT CHANGED    Details   albuterol (Ventolin HFA) 90 mcg/act inhaler Inhale 2 puffs every 6 (six) hours as needed for wheezing, Starting Mon 6/20/2022, Normal      amLODIPine (NORVASC) 5 mg tablet TAKE 1 TABLET (5 MG TOTAL) BY MOUTH DAILY, Starting Wed 7/12/2023, Normal      ammonium lactate (LAC-HYDRIN) 12 % cream Starting Wed 8/2/2023, Historical Med      buPROPion (WELLBUTRIN XL) 300 mg 24 hr tablet Take 300 mg by mouth daily, Starting Fri 9/16/2022, Historical Med      carBAMazepine (CARBATROL) 300 MG 12 hr capsule Take 1 capsule by mouth in the morning, Starting Wed 11/23/2022, Historical Med      carboxymethylcellulose (REFRESH PLUS) 0.5 % SOLN Historical Med      Cholecalciferol (Vitamin D3) 125 MCG (5000 UT) CAPS Take by mouth in the morning, Starting Wed 5/25/2022, Historical Med      clotrimazole-betamethasone (LOTRISONE) 1-0.05 % cream Apply topically 2 (two) times a day, Starting Tue 7/11/2023, Normal      colestipol (COLESTID) 1 g tablet Take 1 tablet (1 g total) by mouth 2 (two) times a day, Starting Sat 7/16/2022, Until Thu 8/10/2023, No Print      cyclobenzaprine (FLEXERIL) 10 mg tablet Take 1 tablet (10 mg total) by mouth daily at bedtime, Starting Thu 6/1/2023, Normal      cyproheptadine (PERIACTIN) 4 mg tablet Historical Med      dexamethasone (DECADRON) 1 mg tablet Historical Med      !! Diclofenac Sodium (VOLTAREN) 1 % Apply 2 g topically 3 (three) times a day as needed (pain) For pain to affected area, Starting Fri 5/5/2023, Normal      !!  Diclofenac Sodium (VOLTAREN) 1 % Apply 2 g topically 4 (four) times a day, Starting Mon 7/17/2023, Normal      !! divalproex sodium (DEPAKOTE ER) 250 mg 24 hr tablet Starting Mon 9/26/2022, Historical Med      !! divalproex sodium (DEPAKOTE ER) 500 mg 24 hr tablet 500 mg 2 (two) times a day, Starting Wed 9/14/2022, Historical Med      divalproex sodium (DEPAKOTE) 250 mg EC tablet Take 3 tablets (750 mg total) by mouth every 12 (twelve) hours, Starting Mon 7/25/2022, Until Thu 12/15/2022, Normal      ergocalciferol (ERGOCALCIFEROL) 1.25 MG (95323 UT) capsule Historical Med      estradiol (ESTRACE) 0.5 MG tablet Take 1 tablet (0.5 mg total) by mouth daily, Starting Thu 5/4/2023, Normal      Fesoterodine Fumarate ER (Toviaz) 4 MG TB24 Take 1 tablet by mouth daily, Historical Med      fluticasone (FLOVENT HFA) 110 MCG/ACT inhaler Inhale 2 puffs as needed Rinse mouth after use., Historical Med      !! furosemide (LASIX) 20 mg tablet Take 1 tablet (20 mg total) by mouth daily, Starting Thu 7/6/2023, Normal      !! furosemide (LASIX) 20 mg tablet TAKE 1 TABLET BY MOUTH DAILY, Normal      haloperidol decanoate (Haldol Decanoate) 50 mg/mL injection every 30 (thirty) days, Starting Wed 8/3/2022, Historical Med      !! hydrocortisone 2.5 % ointment Apply topically 2 (two) times a day, Starting Tue 10/25/2022, Normal      !! hydrocortisone 2.5 % ointment Apply topically 2 (two) times a day, Starting Tue 3/28/2023, Normal      hydrOXYzine HCL (ATARAX) 25 mg tablet Take 1 tablet (25 mg total) by mouth every 6 (six) hours as needed for itching (mild anxiety), Starting Tue 7/26/2022, Until u 8/10/2023 at 2359, Normal      levothyroxine (Euthyrox) 125 mcg tablet Take 1 tablet (125 mcg total) by mouth daily in the early morning, Starting Thu 3/16/2023, Normal      losartan (COZAAR) 50 mg tablet TAKE ONE TABLET BY MOUTH DAILY, Normal      Lurasidone HCl 60 MG TABS Take 1 tablet (60 mg total) by mouth 2 (two) times a day TAKEN IN THE MORNING AND AT NIGHT-----Latuda, Starting Thu 1/26/2023, Until Sat 2/25/2023, No Print      memantine (NAMENDA) 10 mg tablet Historical Med      methocarbamol (ROBAXIN) 500 mg tablet Take 1 tablet (500 mg total) by mouth 3 (three) times a day as needed for muscle spasms, Starting Wed 5/31/2023, Normal      metoprolol tartrate (LOPRESSOR) 25 mg tablet TAKE ONE TABLET BY MOUTH EVERY 12 HOURS, Normal      mometasone (ELOCON) 0.1 % lotion Apply topically daily, Starting Thu 2/23/2023, Normal      nystatin (MYCOSTATIN) powder Apply topically 2 (two) times a day, Starting Fri 8/18/2023, Normal      ondansetron (ZOFRAN-ODT) 4 mg disintegrating tablet Take 1 tablet (4 mg total) by mouth every 6 (six) hours as needed for nausea or vomiting, Starting Fri 8/18/2023, Normal      phenazopyridine (PYRIDIUM) 100 mg tablet Take 1 tablet (100 mg total) by mouth 3 (three) times a day as needed for bladder spasms, Starting Thu 3/23/2023, Normal      pilocarpine (SALAGEN) 5 mg tablet Take 5 mg by mouth 2 (two) times a day, Historical Med      predniSONE 20 mg tablet Historical Med      RABEprazole (ACIPHEX) 20 MG tablet Take 20 mg by mouth 2 (two) times a day, Historical Med      !! rOPINIRole (REQUIP) 0.5 mg tablet Take by mouth daily at bedtime, Starting Wed 5/25/2022, Historical Med      !! rOPINIRole (REQUIP) 1 mg tablet Starting Mon 9/26/2022, Historical Med      !! topiramate (TOPAMAX) 200 MG tablet Starting Wed 1/18/2023, Historical Med      !! topiramate (TOPAMAX) 50 MG tablet Take 50 mg by mouth 2 (two) times a day, Starting Wed 10/12/2022, Historical Med      triamcinolone (KENALOG) 0.1 % ointment Historical Med      trospium chloride (SANCTURA) 20 mg tablet Take 20 mg by mouth 2 (two) times a day, Starting Thu 1/12/2023, Until Fri 1/12/2024, Historical Med      venlafaxine (EFFEXOR) 37.5 mg tablet Take 37.5 mg by mouth daily, Starting Wed 10/19/2022, Historical Med       !! - Potential duplicate medications found. Please discuss with provider. No discharge procedures on file.     PDMP Review       Value Time User    PDMP Reviewed  Yes 9/20/2022 10:10 AM Frosty Aschoff, PA-C          ED Provider  Electronically Signed by           Mamta Trinidad, [address] [place] [address]  08/22/23 2497

## 2023-08-22 NOTE — TELEPHONE ENCOUNTER
Patient calls in stating 2 weeks ago she started with diarrhea , nausea and abdominal pain. Today patient was straining severely to have a BM and since than she started with severe pain in her rectum that is constant . Rates rectal pain an 8. Nausea continues and she has taken Zofran to help with the nausea. Denies fever, vomiting or blood form the rectum. Reccommended ER evaluation.

## 2023-08-22 NOTE — ED NOTES
Patient reports doing meth earlier today. Patient is very jittery.      Bismark Mortensen RN  08/22/23 9461

## 2023-08-22 NOTE — DISCHARGE INSTRUCTIONS
Schedule an appointment with your primary care provider for reevaluation of your symptoms in the next 2 to 3 days. Please also discuss your elevated BP readings today and have your blood pressure rechecked. As discussed, please try to stay hydrated as this will prevent constipation. Use stool softener, docusate (Colace) 100mg as needed to prevent pain with bowel movements. Return to the ER if you develop new or worsening pain, fever, vomiting, difficulty breathing, blood in vomit or stool, weakness, confusion, or lethargy.

## 2023-08-22 NOTE — ED NOTES
Two RN's tried twice each to get an IV on patient. A third RN is in room now assessing patient for an IV line needed for blood work and CT scan.      Antelmo Sandoval RN  08/22/23 7517

## 2023-08-22 NOTE — TELEPHONE ENCOUNTER
Regarding: rectal strain  ----- Message from [employer] sent at 8/22/2023 12:14 AM EDT -----  "My nausea from Friday has gotten worst. I think I strained myself when trying to go to the bathroom being that I am constipated. It hurts so bad when I try to go to the to stand or sit.  I don't know what to do."

## 2023-08-22 NOTE — TELEPHONE ENCOUNTER
Reason for Disposition  • [1] Sudden onset rectal pain AND [2] constipated (straining, rectal pressure or fullness) AND [3] NOT better after SITZ bath, suppository or enema  • SEVERE rectal pain (e.g., excruciating, unable to have a bowel movement)    Additional Information  • Rectal pain or itching is main symptom    Answer Assessment - Initial Assessment Questions  1. STOOL PATTERN OR FREQUENCY: "How often do you pass bowel movements (BMs)?"  (Normal range: tid to q 3 days)  "When was the last BM passed?"       strain to have a BM and now with severe pain in rectum constant pain in rectum   Pain started this morning after small BM two weeks of diarrhea      On zofran for nausea    Has not had a BM for days     Intermittent moderate abdominal pain with severe nausea and no BM for days     2. STRAINING: "Do you have to strain to have a BM?"        Yes       3. RECTAL PAIN: "Does your rectum hurt when the stool comes out?" If Yes, ask: "Do you have hemorrhoids? How bad is the pain?"  (Scale 1-10; or mild, moderate, severe)         Severe       4. STOOL COMPOSITION: "Are the stools hard?"        Diarrhea x 2 weeks     5. BLOOD ON STOOLS: "Has there been any blood on the toilet tissue or on the surface of the BM?" If Yes, ask: "When was the last time?"         denies    6. CHRONIC CONSTIPATION: "Is this a new problem for you?"  If no, ask: "How long have you had this problem?" (days, weeks, months)         Denies      7. CHANGES IN DIET OR HYDRATION: "Have there been any recent changes in your diet?" "How much fluids are you drinking consuming on a daily basis?"  "How much have you had to drink today?"       Could be dehydrated    8. MEDICATIONS: "Have you been taking any new medications?" "Are you taking any narcotic pain medications?" (e.g., Vicoden, Percocet, morphine, dilaudid)       denies      9.  LAXATIVES: "Have you been using any stool softeners, laxatives, or enemas?"  If yes, ask "What, how often, and when was the last time?"    Denies      10. ACTIVITY:  "How much walking do you do every day? on a daily basis?"  "Has your activity level decreased in the past week?"              11. CAUSE: "What do you think is causing the constipation?"         Unsure     12. OTHER SYMPTOMS: "Do you have any other symptoms?" (e.g., abdominal pain, bloating, fever, vomiting)         Nausea denies fever and no vomiting     13.  MEDICAL HISTORY: "Do you have a history of hemorrhoids, rectal fissures, or rectal surgery or rectal abscess?"           Hx of rectal fissure    Protocols used: RECTAL SYMPTOMS-ADULT-, CONSTIPATION-ADULT-AH

## 2023-08-23 ENCOUNTER — NURSE TRIAGE (OUTPATIENT)
Dept: OTHER | Facility: OTHER | Age: 53
End: 2023-08-23

## 2023-08-23 ENCOUNTER — OFFICE VISIT (OUTPATIENT)
Dept: FAMILY MEDICINE CLINIC | Facility: CLINIC | Age: 53
End: 2023-08-23
Payer: MEDICARE

## 2023-08-23 VITALS
OXYGEN SATURATION: 94 % | DIASTOLIC BLOOD PRESSURE: 72 MMHG | HEART RATE: 78 BPM | BODY MASS INDEX: 46.77 KG/M2 | HEIGHT: 66 IN | SYSTOLIC BLOOD PRESSURE: 112 MMHG | WEIGHT: 291 LBS | TEMPERATURE: 97.2 F

## 2023-08-23 DIAGNOSIS — A09 DIARRHEA OF INFECTIOUS ORIGIN: Primary | ICD-10-CM

## 2023-08-23 PROCEDURE — 99213 OFFICE O/P EST LOW 20 MIN: CPT

## 2023-08-23 NOTE — TELEPHONE ENCOUNTER
Regarding: blood in stool  ----- Message from Reji Tyson sent at 8/23/2023 12:25 AM EDT -----  Pt called, " there is blood in my stool and it looks bright red."

## 2023-08-23 NOTE — ASSESSMENT & PLAN NOTE
Reoccurring diarrhea for the past 2 weeks  Has tried Imodium with minimal relief of symptoms however presented to the ER on 08/22/2023 and was impacted  Now presents with frequent diarrhea and blood in her stools   Blood in her stools likely from irritation  Cultures to be collected including C. difficile, ova/parasites, and Giardia  If these results are negative and diarrhea does not resolve GI referral would be the next appropriate step

## 2023-08-23 NOTE — PROGRESS NOTES
Name: Andre Drake      : 1970      MRN: [id number]  Encounter Provider: JHONATAN Durham  Encounter Date: 2023   Encounter department: [facility] En[facility] Antunez     1. Diarrhea of infectious origin  Assessment & Plan:  Reoccurring diarrhea for the past 2 weeks  Has tried Imodium with minimal relief of symptoms however presented to the ER on 2023 and was impacted  Now presents with frequent diarrhea and blood in her stools   Blood in her stools likely from irritation  Cultures to be collected including C. difficile, ova/parasites, and Giardia  If these results are negative and diarrhea does not resolve GI referral would be the next appropriate step    Orders:  -     Giardia antigen; Future  -     Clostridium difficile toxin by PCR; Future  -     Ova and parasite examination; Future         Subjective      Saw patient on  for nausea and diarrhea. Diarrhea resolved but still had persistent nausea. Zofran was ordered. Patient presented to the emergency department on 23 with abdominal pain. 2 weeks of nausea and 1 day of rectal pressure. Increase in loose stools over the last 2 weeks with a total of 4-5 BMs daily (denies blood or melena) for which she has been taking Imodium with relief of her symptoms. 23 abrupt stool cessation with developing lower abdominal discomfort and a strong pressure-like sensation in her rectum and has been unable to stool. CT ab/pelvis: No acute intra-abdominal abnormality. In the ED was manually disimpacted. Discharged home. Presents to the office with return of symptoms of diarrhea and nausea. Diarrhea   This is a recurrent problem. The current episode started in the past 7 days. The problem occurs 5 to 10 times per day. The problem has been gradually worsening. The stool consistency is described as bloody. The patient states that diarrhea awakens her from sleep.  Associated symptoms include abdominal pain and increased flatus. Pertinent negatives include no arthralgias, bloating, chills, coughing, fever, headaches, vomiting or weight loss. Nothing aggravates the symptoms. There are no known risk factors. She has tried anti-motility drug for the symptoms. The treatment provided no relief. Review of Systems   Constitutional: Negative for activity change, chills, fatigue, fever and weight loss. HENT: Negative for congestion, ear pain, rhinorrhea, sore throat and trouble swallowing. Eyes: Negative for pain and visual disturbance. Respiratory: Negative for cough, chest tightness and shortness of breath. Cardiovascular: Negative for chest pain, palpitations and leg swelling. Gastrointestinal: Positive for abdominal pain, blood in stool, diarrhea, flatus and rectal pain. Negative for bloating, constipation, nausea and vomiting. Genitourinary: Negative for difficulty urinating, dysuria, hematuria and urgency. Musculoskeletal: Negative for arthralgias and back pain. Skin: Negative for color change and rash. Neurological: Negative for dizziness, seizures, syncope and headaches. Psychiatric/Behavioral: Negative for dysphoric mood. The patient is not nervous/anxious. All other systems reviewed and are negative.       Current Outpatient Medications on File Prior to Visit   Medication Sig   • albuterol (Ventolin HFA) 90 mcg/act inhaler Inhale 2 puffs every 6 (six) hours as needed for wheezing   • amLODIPine (NORVASC) 5 mg tablet TAKE 1 TABLET (5 MG TOTAL) BY MOUTH DAILY   • ammonium lactate (LAC-HYDRIN) 12 % cream    • buPROPion (WELLBUTRIN XL) 300 mg 24 hr tablet Take 300 mg by mouth daily   • carBAMazepine (CARBATROL) 300 MG 12 hr capsule Take 1 capsule by mouth in the morning   • Cholecalciferol (Vitamin D3) 125 MCG (5000 UT) CAPS Take by mouth in the morning   • cyclobenzaprine (FLEXERIL) 10 mg tablet Take 1 tablet (10 mg total) by mouth daily at bedtime   • cyproheptadine (PERIACTIN) 4 mg tablet    • dexamethasone (DECADRON) 1 mg tablet    • Diclofenac Sodium (VOLTAREN) 1 % Apply 2 g topically 3 (three) times a day as needed (pain) For pain to affected area   • Diclofenac Sodium (VOLTAREN) 1 % Apply 2 g topically 4 (four) times a day   • divalproex sodium (DEPAKOTE ER) 250 mg 24 hr tablet    • divalproex sodium (DEPAKOTE ER) 500 mg 24 hr tablet 500 mg 2 (two) times a day   • ergocalciferol (ERGOCALCIFEROL) 1.25 MG (74313 UT) capsule    • estradiol (ESTRACE) 0.5 MG tablet Take 1 tablet (0.5 mg total) by mouth daily   • Fesoterodine Fumarate ER (Toviaz) 4 MG TB24 Take 1 tablet by mouth daily   • fluticasone (FLOVENT HFA) 110 MCG/ACT inhaler Inhale 2 puffs as needed Rinse mouth after use.    • furosemide (LASIX) 20 mg tablet TAKE 1 TABLET BY MOUTH DAILY   • haloperidol decanoate (Haldol Decanoate) 50 mg/mL injection every 30 (thirty) days   • hydrocortisone 2.5 % ointment Apply topically 2 (two) times a day   • levothyroxine (Euthyrox) 125 mcg tablet Take 1 tablet (125 mcg total) by mouth daily in the early morning   • losartan (COZAAR) 50 mg tablet TAKE ONE TABLET BY MOUTH DAILY   • memantine (NAMENDA) 10 mg tablet    • metoprolol tartrate (LOPRESSOR) 25 mg tablet TAKE ONE TABLET BY MOUTH EVERY 12 HOURS   • nystatin (MYCOSTATIN) powder Apply topically 2 (two) times a day   • ondansetron (ZOFRAN-ODT) 4 mg disintegrating tablet Take 1 tablet (4 mg total) by mouth every 6 (six) hours as needed for nausea or vomiting   • phenazopyridine (PYRIDIUM) 100 mg tablet Take 1 tablet (100 mg total) by mouth 3 (three) times a day as needed for bladder spasms   • pilocarpine (SALAGEN) 5 mg tablet Take 5 mg by mouth 2 (two) times a day   • predniSONE 20 mg tablet    • RABEprazole (ACIPHEX) 20 MG tablet Take 20 mg by mouth 2 (two) times a day   • rOPINIRole (REQUIP) 0.5 mg tablet Take by mouth daily at bedtime   • rOPINIRole (REQUIP) 1 mg tablet    • topiramate (TOPAMAX) 200 MG tablet    • topiramate (TOPAMAX) 50 MG tablet Take 50 mg by mouth 2 (two) times a day   • trospium chloride (SANCTURA) 20 mg tablet Take 20 mg by mouth 2 (two) times a day   • venlafaxine (EFFEXOR) 37.5 mg tablet Take 37.5 mg by mouth daily   • buPROPion (WELLBUTRIN XL) 150 mg 24 hr tablet Take 1 tablet (150 mg total) by mouth daily   • carboxymethylcellulose (REFRESH PLUS) 0.5 % SOLN  (Patient not taking: Reported on 8/10/2023)   • clotrimazole-betamethasone (LOTRISONE) 1-0.05 % cream Apply topically 2 (two) times a day (Patient not taking: Reported on 8/10/2023)   • colestipol (COLESTID) 1 g tablet Take 1 tablet (1 g total) by mouth 2 (two) times a day   • divalproex sodium (DEPAKOTE) 250 mg EC tablet Take 3 tablets (750 mg total) by mouth every 12 (twelve) hours   • furosemide (LASIX) 20 mg tablet Take 1 tablet (20 mg total) by mouth daily (Patient not taking: Reported on 8/10/2023)   • hydrocortisone 2.5 % ointment Apply topically 2 (two) times a day (Patient not taking: Reported on 8/18/2023)   • hydrOXYzine HCL (ATARAX) 25 mg tablet Take 1 tablet (25 mg total) by mouth every 6 (six) hours as needed for itching (mild anxiety)   • Lurasidone HCl 60 MG TABS Take 1 tablet (60 mg total) by mouth 2 (two) times a day TAKEN IN THE MORNING AND AT NIGHT-----Latuda   • methocarbamol (ROBAXIN) 500 mg tablet Take 1 tablet (500 mg total) by mouth 3 (three) times a day as needed for muscle spasms (Patient not taking: Reported on 7/11/2023)   • mometasone (ELOCON) 0.1 % lotion Apply topically daily (Patient not taking: Reported on 7/11/2023)   • topiramate (TOPAMAX) 100 mg tablet Take 1.5 tablets (150 mg total) by mouth 2 (two) times a day   • triamcinolone (KENALOG) 0.1 % ointment  (Patient not taking: Reported on 7/11/2023)       Objective     /72 (BP Location: Left arm, Patient Position: Sitting, Cuff Size: Large)   Pulse 78   Temp (!) 97.2 °F (36.2 °C)   Ht 5' 6" (1.676 m)   Wt 132 kg (291 lb)   SpO2 94%   BMI 46.97 kg/m²     Physical Exam  Vitals and nursing note reviewed. Constitutional:       Appearance: Normal appearance. She is normal weight. HENT:      Head: Normocephalic. Right Ear: Tympanic membrane, ear canal and external ear normal. There is no impacted cerumen. Left Ear: Tympanic membrane, ear canal and external ear normal. There is no impacted cerumen. Nose: Nose normal.      Mouth/Throat:      Mouth: Mucous membranes are moist.      Pharynx: Oropharynx is clear. Eyes:      Extraocular Movements: Extraocular movements intact. Conjunctiva/sclera: Conjunctivae normal.      Pupils: Pupils are equal, round, and reactive to light. Cardiovascular:      Rate and Rhythm: Normal rate and regular rhythm. Pulses: Normal pulses. Heart sounds: Normal heart sounds. Pulmonary:      Effort: Pulmonary effort is normal.      Breath sounds: Normal breath sounds. Abdominal:      General: Bowel sounds are normal. There is no distension. Palpations: Abdomen is soft. Tenderness: There is no abdominal tenderness. Hernia: No hernia is present. Musculoskeletal:         General: Normal range of motion. Cervical back: Normal range of motion. Skin:     General: Skin is warm. Capillary Refill: Capillary refill takes less than 2 seconds. Neurological:      General: No focal deficit present. Mental Status: She is alert and oriented to person, place, and time. Mental status is at baseline. Psychiatric:         Mood and Affect: Mood normal.         Behavior: Behavior normal.         Thought Content:  Thought content normal.         Judgment: Judgment normal.       JHONATAN Santoyo

## 2023-08-25 ENCOUNTER — HOSPITAL ENCOUNTER (OUTPATIENT)
Dept: MRI IMAGING | Facility: HOSPITAL | Age: 53
Discharge: HOME | End: 2023-08-25
Payer: MEDICARE

## 2023-08-25 ENCOUNTER — PATIENT MESSAGE (OUTPATIENT)
Dept: PAIN MEDICINE | Facility: CLINIC | Age: 53
End: 2023-08-25

## 2023-08-25 DIAGNOSIS — M54.2 CERVICALGIA: ICD-10-CM

## 2023-08-25 PROCEDURE — 72141 MRI NECK SPINE W/O DYE: CPT

## 2023-08-25 PROCEDURE — G1004 CDSM NDSC: HCPCS

## 2023-08-28 ENCOUNTER — TELEPHONE (OUTPATIENT)
Age: 53
End: 2023-08-28

## 2023-08-28 DIAGNOSIS — F41.9 ANXIETY: ICD-10-CM

## 2023-08-28 DIAGNOSIS — M54.2 CERVICALGIA: Primary | ICD-10-CM

## 2023-08-28 NOTE — TELEPHONE ENCOUNTER
Duplicate task: Mychart msg sent by pt this morning has been sent Bradley Alvarado for review. RN s/w pt and advised her of same.

## 2023-08-28 NOTE — TELEPHONE ENCOUNTER
Caller: patient    Doctor: Donato Luke    Reason for call: could not complete her MRI due to her tremors. Please advise.     Call back#: [phone]

## 2023-08-29 NOTE — TELEPHONE ENCOUNTER
How Severe Are Your Spot(S)?: mild I dont see this on her med list  Please advise What Is The Reason For Today's Visit?: Full Body Skin Examination What Is The Reason For Today's Visit? (Being Monitored For X): concerning skin lesions on an annual basis

## 2023-08-29 NOTE — TELEPHONE ENCOUNTER
Caller: pt    Doctor: virgil    Reason for call: pt would like to get her mri sooner, she said she can't wait until December.     Call back#: [phone]

## 2023-08-29 NOTE — TELEPHONE ENCOUNTER
S/w the patient and she stated she scheduled her MRI and she has to wait until December and she can't wait that long and she started immediately crying on the phone. Unfortunately I told her I have no control over the MRI department. Encouraged her to contact her insurance company to see if they approve out of network, [facility] or [facility]. Inquired about her pain and reviewed that if her pain is above a 10 then she should seek treatment at the ED. Any loss of B & B and she stated no. Reviewed that unfortunately it's not an emergency. She was pretty emotional and support was ongoing. Just ANNE.  Thanks

## 2023-08-29 NOTE — PATIENT COMMUNICATION
I honestly do not know if the tremors are secondary to neurologic disturbance or if they are related to her current methamphetamine abuse. When is her next appointment with neurology?

## 2023-08-29 NOTE — TELEPHONE ENCOUNTER
.Caller: pt    Doctor: Phil Mayorga    Reason for call: Please follow up with pt about her MRI.  She would like to get this scheduled asap, before her FU w/ [facility]     Call back#: [phone]

## 2023-08-29 NOTE — TELEPHONE ENCOUNTER
Axel Wray RN  to Preet Burnss, DO        8/28/23  7:34 AM  Pls advise. Would ativan assist w/ tremors or sedation for MRI? Thank you!

## 2023-08-30 RX ORDER — DIAZEPAM 5 MG/1
TABLET ORAL
Qty: 2 TABLET | Refills: 0 | Status: SHIPPED | OUTPATIENT
Start: 2023-08-30 | End: 2023-09-03

## 2023-08-30 NOTE — TELEPHONE ENCOUNTER
Since the patient was unable to tolerate MRI, we will try Valium as premedication which will hopefully minimize anxiety and tremor for image to be completed.   2 tablets of diazepam sent to pharmacy on file for preparation for imaging

## 2023-08-30 NOTE — TELEPHONE ENCOUNTER
Caller: Nanda Henriquez PT    Doctor: Dr Hector Alaniz    Reason for call: MRI results are ready to be reviewed     Call back#: [phone]

## 2023-08-31 ENCOUNTER — TELEPHONE (OUTPATIENT)
Dept: FAMILY MEDICINE CLINIC | Facility: CLINIC | Age: 53
End: 2023-08-31

## 2023-08-31 ENCOUNTER — TELEPHONE (OUTPATIENT)
Dept: NEUROLOGY | Facility: CLINIC | Age: 53
End: 2023-08-31

## 2023-08-31 DIAGNOSIS — R25.1 TREMORS OF NERVOUS SYSTEM: Primary | ICD-10-CM

## 2023-08-31 NOTE — TELEPHONE ENCOUNTER
Called pt and states that she always has neck issue. She has been going to chiropractor but the last time she went chiropractor was beginning of May. Chiropractor was able to crack the side of her neck. Her tremors started in May. She is unsure if it's related to possible neck injury or not. She called her PCP bec her hand tremors are worsening. Her PCP wanted her to see a neurologist. She is aware that [facility] only see migraine pts. Pt states that she is agreeable to see other provider. Preferred [place] ([place] area. Pt states that she also saw her PM who ordered cervical MRI. Her MRI appt is scheduled in Dec.     Cb [phone], ok to leave a detailed message     Dr Annika Marshall, ok to schedule this pt w/ you?

## 2023-08-31 NOTE — TELEPHONE ENCOUNTER
Patient sees Reed Wilkinson for headaches. But now she is having tremors in hands and legs. She said it started from a neck injury which she saw a chiropractor. She cant hold the phone without shaking and has a hard time driving. Please call patient at [phone].

## 2023-08-31 NOTE — TELEPHONE ENCOUNTER
Caller: ana    Doctor: virgil    Reason for call: pt needs a script for her mri to be sent to [facility] but it needs to have on the script that it is with sedation.  Fax: [phone]    Call back#: [phone]

## 2023-08-31 NOTE — PATIENT COMMUNICATION
MRI of the cervical spine without contrast with sedation has been ordered per the patient's request please ensure prescription for Valium that was sent to pharmacy is discontinued since she requires sedation

## 2023-08-31 NOTE — TELEPHONE ENCOUNTER
MRI of the cervical spine with sedation has been ordered.   Please call the pharmacy to discontinue order for Valium

## 2023-09-01 ENCOUNTER — HOSPITAL ENCOUNTER (INPATIENT)
Facility: HOSPITAL | Age: 53
LOS: 1 days | Discharge: HOME | End: 2023-09-03
Attending: EMERGENCY MEDICINE | Admitting: INTERNAL MEDICINE
Payer: MEDICARE

## 2023-09-01 ENCOUNTER — APPOINTMENT (EMERGENCY)
Dept: RADIOLOGY | Facility: HOSPITAL | Age: 53
End: 2023-09-01
Payer: MEDICARE

## 2023-09-01 ENCOUNTER — APPOINTMENT (EMERGENCY)
Dept: CT IMAGING | Facility: HOSPITAL | Age: 53
End: 2023-09-01
Payer: MEDICARE
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
## 2023-09-01 DIAGNOSIS — I26.99 PULMONARY EMBOLISM (HCC): Primary | ICD-10-CM

## 2023-09-01 DIAGNOSIS — F19.10 SUBSTANCE ABUSE (HCC): ICD-10-CM

## 2023-09-01 DIAGNOSIS — I26.92 ACUTE SADDLE PULMONARY EMBOLISM WITHOUT ACUTE COR PULMONALE (HCC): ICD-10-CM

## 2023-09-01 DIAGNOSIS — F25.0 SCHIZOAFFECTIVE DISORDER, BIPOLAR TYPE (HCC): ICD-10-CM

## 2023-09-01 LAB
ALBUMIN SERPL BCP-MCNC: 4 G/DL (ref 3.5–5)
ALP SERPL-CCNC: 52 U/L (ref 34–104)
ALT SERPL W P-5'-P-CCNC: 14 U/L (ref 7–52)
ANION GAP SERPL CALCULATED.3IONS-SCNC: 11 MMOL/L
APTT PPP: 30 SECONDS (ref 23–37)
AST SERPL W P-5'-P-CCNC: 15 U/L (ref 13–39)
BASOPHILS # BLD AUTO: 0.05 THOUSANDS/ÂΜL (ref 0–0.1)
BASOPHILS NFR BLD AUTO: 1 % (ref 0–1)
BILIRUB SERPL-MCNC: 0.36 MG/DL (ref 0.2–1)
BNP SERPL-MCNC: 37 PG/ML (ref 0–100)
BUN SERPL-MCNC: 14 MG/DL (ref 5–25)
CALCIUM SERPL-MCNC: 8.8 MG/DL (ref 8.4–10.2)
CARDIAC TROPONIN I PNL SERPL HS: 66 NG/L
CHLORIDE SERPL-SCNC: 108 MMOL/L (ref 96–108)
CO2 SERPL-SCNC: 22 MMOL/L (ref 21–32)
CREAT SERPL-MCNC: 0.64 MG/DL (ref 0.6–1.3)
D DIMER PPP FEU-MCNC: >20 UG/ML FEU
EOSINOPHIL # BLD AUTO: 0.12 THOUSAND/ÂΜL (ref 0–0.61)
EOSINOPHIL NFR BLD AUTO: 1 % (ref 0–6)
ERYTHROCYTE [DISTWIDTH] IN BLOOD BY AUTOMATED COUNT: 13.2 % (ref 11.6–15.1)
GFR SERPL CREATININE-BSD FRML MDRD: 103 ML/MIN/1.73SQ M
GLUCOSE SERPL-MCNC: 81 MG/DL (ref 65–140)
HCT VFR BLD AUTO: 45.4 % (ref 34.8–46.1)
HGB BLD-MCNC: 14.5 G/DL (ref 11.5–15.4)
IMM GRANULOCYTES # BLD AUTO: 0.07 THOUSAND/UL (ref 0–0.2)
IMM GRANULOCYTES NFR BLD AUTO: 1 % (ref 0–2)
INR PPP: 1.02 (ref 0.84–1.19)
LYMPHOCYTES # BLD AUTO: 2.72 THOUSANDS/ÂΜL (ref 0.6–4.47)
LYMPHOCYTES NFR BLD AUTO: 28 % (ref 14–44)
MCH RBC QN AUTO: 30.1 PG (ref 26.8–34.3)
MCHC RBC AUTO-ENTMCNC: 31.9 G/DL (ref 31.4–37.4)
MCV RBC AUTO: 94 FL (ref 82–98)
MONOCYTES # BLD AUTO: 1.15 THOUSAND/ÂΜL (ref 0.17–1.22)
MONOCYTES NFR BLD AUTO: 12 % (ref 4–12)
NEUTROPHILS # BLD AUTO: 5.75 THOUSANDS/ÂΜL (ref 1.85–7.62)
NEUTS SEG NFR BLD AUTO: 57 % (ref 43–75)
NRBC BLD AUTO-RTO: 0 /100 WBCS
PLATELET # BLD AUTO: 152 THOUSANDS/UL (ref 149–390)
PMV BLD AUTO: 10.6 FL (ref 8.9–12.7)
POTASSIUM SERPL-SCNC: 3.9 MMOL/L (ref 3.5–5.3)
PROT SERPL-MCNC: 7 G/DL (ref 6.4–8.4)
PROTHROMBIN TIME: 14.1 SECONDS (ref 11.6–14.5)
RBC # BLD AUTO: 4.82 MILLION/UL (ref 3.81–5.12)
SODIUM SERPL-SCNC: 141 MMOL/L (ref 135–147)
WBC # BLD AUTO: 9.86 THOUSAND/UL (ref 4.31–10.16)

## 2023-09-01 PROCEDURE — 85610 PROTHROMBIN TIME: CPT

## 2023-09-01 PROCEDURE — 85379 FIBRIN DEGRADATION QUANT: CPT

## 2023-09-01 PROCEDURE — 71045 X-RAY EXAM CHEST 1 VIEW: CPT

## 2023-09-01 PROCEDURE — G1004 CDSM NDSC: HCPCS

## 2023-09-01 PROCEDURE — 84484 ASSAY OF TROPONIN QUANT: CPT

## 2023-09-01 PROCEDURE — 71275 CT ANGIOGRAPHY CHEST: CPT

## 2023-09-01 PROCEDURE — 93005 ELECTROCARDIOGRAM TRACING: CPT

## 2023-09-01 PROCEDURE — 99291 CRITICAL CARE FIRST HOUR: CPT | Performed by: EMERGENCY MEDICINE

## 2023-09-01 PROCEDURE — 36415 COLL VENOUS BLD VENIPUNCTURE: CPT

## 2023-09-01 PROCEDURE — 96375 TX/PRO/DX INJ NEW DRUG ADDON: CPT

## 2023-09-01 PROCEDURE — 96365 THER/PROPH/DIAG IV INF INIT: CPT

## 2023-09-01 PROCEDURE — 80053 COMPREHEN METABOLIC PANEL: CPT

## 2023-09-01 PROCEDURE — 85730 THROMBOPLASTIN TIME PARTIAL: CPT

## 2023-09-01 PROCEDURE — 99285 EMERGENCY DEPT VISIT HI MDM: CPT

## 2023-09-01 PROCEDURE — 83880 ASSAY OF NATRIURETIC PEPTIDE: CPT

## 2023-09-01 PROCEDURE — 85025 COMPLETE CBC W/AUTO DIFF WBC: CPT

## 2023-09-01 RX ORDER — HEPARIN SODIUM 10000 [USP'U]/100ML
3-30 INJECTION, SOLUTION INTRAVENOUS
Status: DISCONTINUED | OUTPATIENT
Start: 2023-09-01 | End: 2023-09-03

## 2023-09-01 RX ORDER — HEPARIN SODIUM 1000 [USP'U]/ML
5000 INJECTION, SOLUTION INTRAVENOUS; SUBCUTANEOUS EVERY 6 HOURS PRN
Status: DISCONTINUED | OUTPATIENT
Start: 2023-09-01 | End: 2023-09-03

## 2023-09-01 RX ORDER — HEPARIN SODIUM 1000 [USP'U]/ML
10000 INJECTION, SOLUTION INTRAVENOUS; SUBCUTANEOUS ONCE
Status: COMPLETED | OUTPATIENT
Start: 2023-09-01 | End: 2023-09-01

## 2023-09-01 RX ORDER — HEPARIN SODIUM 1000 [USP'U]/ML
10000 INJECTION, SOLUTION INTRAVENOUS; SUBCUTANEOUS EVERY 6 HOURS PRN
Status: DISCONTINUED | OUTPATIENT
Start: 2023-09-01 | End: 2023-09-03

## 2023-09-01 RX ADMIN — HEPARIN SODIUM 18 UNITS/KG/HR: 10000 INJECTION, SOLUTION INTRAVENOUS at 22:40

## 2023-09-01 RX ADMIN — HEPARIN SODIUM 10000 UNITS: 1000 INJECTION INTRAVENOUS; SUBCUTANEOUS at 22:39

## 2023-09-01 RX ADMIN — IOHEXOL 75 ML: 350 INJECTION, SOLUTION INTRAVENOUS at 22:17

## 2023-09-01 NOTE — ED NOTES
ED [facility] states patient eloped. Primary RN notified.       Jose Elizabeth RN  09/01/23 7097

## 2023-09-01 NOTE — TELEPHONE ENCOUNTER
Hello,     I have called the patient back to help schedule an OVL with Kenn Olivo as per below notes but patient stated she can not wait until After her MRI is done which it's scheduled for December. Can you please review patient's case and advise if we can offer an appointment with you in [facility]? Appointment would be for her tremors, and she stated the hand tremors are preventing her from driving and the normal daily activities. Patient has not been seen yet by any other attending than  who is no longer with us.        Thank you,     Elizabeth Watt

## 2023-09-01 NOTE — ED ATTENDING ATTESTATION
9/1/2023  I, Chucky Jaimes MD, saw and evaluated the patient. I have discussed the patient with the resident/non-physician practitioner and agree with the resident's/non-physician practitioner's findings, Plan of Care, and MDM as documented in the resident's/non-physician practitioner's note, except where noted. All available labs and Radiology studies were reviewed. I was present for key portions of any procedure(s) performed by the resident/non-physician practitioner and I was immediately available to provide assistance. At this point I agree with the current assessment done in the Emergency Department. I have conducted an independent evaluation of this patient a history and physical is as follows:    52-year-old female presented for evaluation of dyspnea beginning this afternoon. She also reports some chest tightness with inspiration. States she feels okay at rest but walking or other activities make her feel short of breath. She denies any amilcar chest pain, fever, chills, cough. She has a history of methamphetamine abuse and did use this afternoon. She does not feel her symptoms are related to meth as she does this almost every day and usage has not recently changed. Has a history of chronic peripheral edema. Also notes recent infection of the right second toe, currently following with podiatry. On exam here she is in no acute distress. Her breath sounds are clear. Heart sounds are normal.  She does have trace to 1+ pitting edema of both legs. Tremor of the right lower extremity which she reports is chronic. Has a history of COPD and did try albuterol inhaler, denies any improvement after this. Differential diagnosis includes ACS, respiratory infection, pulmonary embolism, COPD exacerbation. Will check EKG, labs, chest imaging, reevaluate.     ED Course  ED Course as of 09/02/23 0000   Fri Sep 01, 2023   2149 hs TnI 0hr(!): 66   2149 D-Dimer, Quant(!): >20.00   2150 Chest x-ray reviewed. No significant changes. 2150 Given elevation in D-dimer will send for CTA of the chest.   2210 EKG reviewed. Sinus rhythm. No acute ischemic changes. No ectopy. 2332 Reassessed patient. Breathing comfortably on 2 L nasal cannula. Saddle pulmonary embolus on CT with right heart strain. Heparin already infusing. 2337 We will discuss with critical care team regarding possible thrombolytics/thrombectomy. 1231 Resident discussed case with the critical care attending. Given that patient is hemodynamically stable, acute thrombolytic intervention not indicated. Plan admission to medical service, monitored bed.          Critical Care Time  CriticalCare Time    Date/Time: 9/1/2023 11:58 PM    Performed by: Sameera Parisi MD  Authorized by: Sameera Parisi MD    Critical care provider statement:     Critical care time (minutes):  35    Critical care time was exclusive of:  Separately billable procedures and treating other patients and teaching time    Critical care was necessary to treat or prevent imminent or life-threatening deterioration of the following conditions:  Respiratory failure    Critical care was time spent personally by me on the following activities:  Obtaining history from patient or surrogate, development of treatment plan with patient or surrogate, discussions with consultants, evaluation of patient's response to treatment, examination of patient, ordering and performing treatments and interventions, ordering and review of laboratory studies, ordering and review of radiographic studies, re-evaluation of patient's condition and review of old charts

## 2023-09-01 NOTE — ED NOTES
Received notification that the patient was out in the waiting room, agitated. Found patient standing in zone 1 hallway with ED Charge Jt Medina. Pt noted to be leaning against wall, eyes lowered to the floor, with B/L UE tremors. Pt refused to answer questions posed by this RN. After reassurance, patient was transported to ED 23 via wheelchair by this RN. Once in ED 23, patient was noted to be dyspneic with dark purple lips (RA O2 Sat 92%), chest pressure (my lungs) and having increased B/L UE/LE tremors. Pt is now more communicative and verbalized that she started having SOB around 1720 tonight while working on crafts. Pt admitted she self administered 2 puffs of her Albuterol inhaler then called 911 due to negative improvement of symptoms. Pt also admitted that she has an ACT team and attempted to contact them this morning "but unable to reach them so then I ended up cutting myself and using" Pt clarified that she used a utility knife blade to cut her left anterior forearm (superficial horizontal laceration noted-no active bleed. Tetanus status unknown). Pt stated she has been "a cutter since I was a teenager". Pt stated "I just cut to relieve the stress, I don't cut to kill myself". Pt denies SI/HI/AH/VH at present time. Pt also clarified that she snorted Meth this morning to help "medicate myself". Pt admitted she has been using Meth "everyday for years". Pt declined assistance for rehab. Pt stated "I don't want to go to rehab because I don't want my Father to find out". After RN assessment, patient requested the chance "to call my ACT Team". Encouragement provided.       09/01/23 2032       Saima Schmid RN  09/01/23 2033       Saima Schmid RN  09/01/23 2035

## 2023-09-02 ENCOUNTER — APPOINTMENT (INPATIENT)
Dept: VASCULAR ULTRASOUND | Facility: HOSPITAL | Age: 53
End: 2023-09-02
Payer: MEDICARE

## 2023-09-02 ENCOUNTER — APPOINTMENT (INPATIENT)
Dept: NON INVASIVE DIAGNOSTICS | Facility: HOSPITAL | Age: 53
End: 2023-09-02
Payer: MEDICARE

## 2023-09-02 PROBLEM — R77.8 ELEVATED TROPONIN: Status: ACTIVE | Noted: 2023-09-02

## 2023-09-02 PROBLEM — I26.99 ACUTE PULMONARY EMBOLISM (HCC): Status: ACTIVE | Noted: 2023-09-02

## 2023-09-02 PROBLEM — I26.92 ACUTE SADDLE PULMONARY EMBOLISM (HCC): Status: ACTIVE | Noted: 2023-09-02

## 2023-09-02 PROBLEM — R79.89 ELEVATED TROPONIN: Status: ACTIVE | Noted: 2023-09-02

## 2023-09-02 PROBLEM — J96.01 ACUTE RESPIRATORY FAILURE WITH HYPOXIA (HCC): Status: ACTIVE | Noted: 2023-09-02

## 2023-09-02 LAB
2HR DELTA HS TROPONIN: 41 NG/L
4HR DELTA HS TROPONIN: 47 NG/L
AMPHETAMINES SERPL QL SCN: POSITIVE
ANION GAP SERPL CALCULATED.3IONS-SCNC: 11 MMOL/L
AORTIC ROOT: 2.9 CM
APICAL FOUR CHAMBER EJECTION FRACTION: 73 %
APTT PPP: 166 SECONDS (ref 23–37)
APTT PPP: 81 SECONDS (ref 23–37)
APTT PPP: >210 SECONDS (ref 23–37)
ATRIAL RATE: 77 BPM
ATRIAL RATE: 81 BPM
ATRIAL RATE: 84 BPM
BARBITURATES UR QL: NEGATIVE
BENZODIAZ UR QL: NEGATIVE
BUN SERPL-MCNC: 11 MG/DL (ref 5–25)
CALCIUM SERPL-MCNC: 8.2 MG/DL (ref 8.4–10.2)
CARDIAC TROPONIN I PNL SERPL HS: 107 NG/L
CARDIAC TROPONIN I PNL SERPL HS: 113 NG/L
CARDIAC TROPONIN I PNL SERPL HS: 67 NG/L (ref 8–18)
CHLORIDE SERPL-SCNC: 107 MMOL/L (ref 96–108)
CO2 SERPL-SCNC: 22 MMOL/L (ref 21–32)
COCAINE UR QL: NEGATIVE
CREAT SERPL-MCNC: 0.51 MG/DL (ref 0.6–1.3)
E WAVE DECELERATION TIME: 174 MS
ERYTHROCYTE [DISTWIDTH] IN BLOOD BY AUTOMATED COUNT: 13.3 % (ref 11.6–15.1)
FRACTIONAL SHORTENING: 35 (ref 28–44)
GFR SERPL CREATININE-BSD FRML MDRD: 111 ML/MIN/1.73SQ M
GLUCOSE SERPL-MCNC: 92 MG/DL (ref 65–140)
HCT VFR BLD AUTO: 43.6 % (ref 34.8–46.1)
HGB BLD-MCNC: 14.1 G/DL (ref 11.5–15.4)
INTERVENTRICULAR SEPTUM IN DIASTOLE (PARASTERNAL SHORT AXIS VIEW): 1 CM
INTERVENTRICULAR SEPTUM: 1 CM (ref 0.6–1.1)
LAAS-AP4: 19.9 CM2
LEFT ATRIUM SIZE: 4.7 CM
LEFT INTERNAL DIMENSION IN SYSTOLE: 3.1 CM (ref 2.1–4)
LEFT VENTRICULAR INTERNAL DIMENSION IN DIASTOLE: 4.8 CM (ref 3.5–6)
LEFT VENTRICULAR POSTERIOR WALL IN END DIASTOLE: 1 CM
LEFT VENTRICULAR STROKE VOLUME: 70 ML
LVSV (TEICH): 70 ML
MCH RBC QN AUTO: 30.7 PG (ref 26.8–34.3)
MCHC RBC AUTO-ENTMCNC: 32.3 G/DL (ref 31.4–37.4)
MCV RBC AUTO: 95 FL (ref 82–98)
METHADONE UR QL: NEGATIVE
MV E'TISSUE VEL-SEP: 11 CM/S
MV PEAK A VEL: 1.02 M/S
MV PEAK E VEL: 83 CM/S
MV STENOSIS PRESSURE HALF TIME: 51 MS
MV VALVE AREA P 1/2 METHOD: 4.31
OPIATES UR QL SCN: NEGATIVE
OXYCODONE+OXYMORPHONE UR QL SCN: NEGATIVE
P AXIS: 50 DEGREES
P AXIS: 58 DEGREES
P AXIS: 8 DEGREES
PCP UR QL: NEGATIVE
PLATELET # BLD AUTO: 143 THOUSANDS/UL (ref 149–390)
PMV BLD AUTO: 10.6 FL (ref 8.9–12.7)
POTASSIUM SERPL-SCNC: 3.6 MMOL/L (ref 3.5–5.3)
PR INTERVAL: 184 MS
PR INTERVAL: 198 MS
PR INTERVAL: 200 MS
QRS AXIS: -15 DEGREES
QRS AXIS: 22 DEGREES
QRS AXIS: 69 DEGREES
QRSD INTERVAL: 86 MS
QRSD INTERVAL: 86 MS
QRSD INTERVAL: 92 MS
QT INTERVAL: 356 MS
QT INTERVAL: 366 MS
QT INTERVAL: 366 MS
QTC INTERVAL: 414 MS
QTC INTERVAL: 420 MS
QTC INTERVAL: 425 MS
RA PRESSURE ESTIMATED: 3 MMHG
RBC # BLD AUTO: 4.59 MILLION/UL (ref 3.81–5.12)
RIGHT ATRIAL 2D VOLUME: 44 ML
RIGHT ATRIUM AREA SYSTOLE A4C: 16.7 CM2
RIGHT VENTRICLE ID DIMENSION: 4 CM
RV PSP: 60 MMHG
SL CV LV EF: 60
SL CV PED ECHO LEFT VENTRICLE DIASTOLIC VOLUME (MOD BIPLANE) 2D: 107 ML
SL CV PED ECHO LEFT VENTRICLE SYSTOLIC VOLUME (MOD BIPLANE) 2D: 37 ML
SODIUM SERPL-SCNC: 140 MMOL/L (ref 135–147)
T WAVE AXIS: 38 DEGREES
T WAVE AXIS: 7 DEGREES
T WAVE AXIS: 8 DEGREES
THC UR QL: POSITIVE
TR MAX PG: 57 MMHG
TR PEAK VELOCITY: 3.8 M/S
TRICUSPID ANNULAR PLANE SYSTOLIC EXCURSION: 2.7 CM
TRICUSPID VALVE PEAK REGURGITATION VELOCITY: 3.76 M/S
VENTRICULAR RATE: 77 BPM
VENTRICULAR RATE: 81 BPM
VENTRICULAR RATE: 84 BPM
WBC # BLD AUTO: 9.92 THOUSAND/UL (ref 4.31–10.16)

## 2023-09-02 PROCEDURE — 93010 ELECTROCARDIOGRAM REPORT: CPT | Performed by: INTERNAL MEDICINE

## 2023-09-02 PROCEDURE — 85730 THROMBOPLASTIN TIME PARTIAL: CPT

## 2023-09-02 PROCEDURE — C8929 TTE W OR WO FOL WCON,DOPPLER: HCPCS

## 2023-09-02 PROCEDURE — 93970 EXTREMITY STUDY: CPT | Performed by: INTERNAL MEDICINE

## 2023-09-02 PROCEDURE — G0426 INPT/ED TELECONSULT50: HCPCS | Performed by: GENERAL PRACTICE

## 2023-09-02 PROCEDURE — 85027 COMPLETE CBC AUTOMATED: CPT

## 2023-09-02 PROCEDURE — 93306 TTE W/DOPPLER COMPLETE: CPT | Performed by: INTERNAL MEDICINE

## 2023-09-02 PROCEDURE — 94760 N-INVAS EAR/PLS OXIMETRY 1: CPT

## 2023-09-02 PROCEDURE — 84484 ASSAY OF TROPONIN QUANT: CPT

## 2023-09-02 PROCEDURE — 80048 BASIC METABOLIC PNL TOTAL CA: CPT

## 2023-09-02 PROCEDURE — 93005 ELECTROCARDIOGRAM TRACING: CPT

## 2023-09-02 PROCEDURE — 93970 EXTREMITY STUDY: CPT

## 2023-09-02 PROCEDURE — 94660 CPAP INITIATION&MGMT: CPT

## 2023-09-02 PROCEDURE — 85730 THROMBOPLASTIN TIME PARTIAL: CPT | Performed by: INTERNAL MEDICINE

## 2023-09-02 PROCEDURE — 80307 DRUG TEST PRSMV CHEM ANLYZR: CPT | Performed by: INTERNAL MEDICINE

## 2023-09-02 PROCEDURE — 99223 1ST HOSP IP/OBS HIGH 75: CPT | Performed by: INTERNAL MEDICINE

## 2023-09-02 RX ORDER — AMLODIPINE BESYLATE 5 MG/1
5 TABLET ORAL DAILY
Status: DISCONTINUED | OUTPATIENT
Start: 2023-09-02 | End: 2023-09-03 | Stop reason: HOSPADM

## 2023-09-02 RX ORDER — ROPINIROLE 1 MG/1
1 TABLET, FILM COATED ORAL
Status: DISCONTINUED | OUTPATIENT
Start: 2023-09-02 | End: 2023-09-03 | Stop reason: HOSPADM

## 2023-09-02 RX ORDER — ONDANSETRON 4 MG/1
4 TABLET, ORALLY DISINTEGRATING ORAL EVERY 6 HOURS PRN
Status: DISCONTINUED | OUTPATIENT
Start: 2023-09-02 | End: 2023-09-03 | Stop reason: HOSPADM

## 2023-09-02 RX ORDER — MEMANTINE HYDROCHLORIDE 10 MG/1
10 TABLET ORAL DAILY
Status: DISCONTINUED | OUTPATIENT
Start: 2023-09-02 | End: 2023-09-03 | Stop reason: HOSPADM

## 2023-09-02 RX ORDER — BUPROPION HYDROCHLORIDE 150 MG/1
450 TABLET ORAL DAILY
Status: DISCONTINUED | OUTPATIENT
Start: 2023-09-02 | End: 2023-09-03 | Stop reason: HOSPADM

## 2023-09-02 RX ORDER — BUPROPION HYDROCHLORIDE 150 MG/1
150 TABLET ORAL DAILY
Status: DISCONTINUED | OUTPATIENT
Start: 2023-09-02 | End: 2023-09-02

## 2023-09-02 RX ORDER — ONDANSETRON 2 MG/ML
4 INJECTION INTRAMUSCULAR; INTRAVENOUS EVERY 6 HOURS PRN
Status: DISCONTINUED | OUTPATIENT
Start: 2023-09-02 | End: 2023-09-02

## 2023-09-02 RX ORDER — LURASIDONE HYDROCHLORIDE 20 MG/1
60 TABLET, FILM COATED ORAL 2 TIMES DAILY
Status: DISCONTINUED | OUTPATIENT
Start: 2023-09-02 | End: 2023-09-03 | Stop reason: HOSPADM

## 2023-09-02 RX ORDER — LEVOTHYROXINE SODIUM 0.12 MG/1
125 TABLET ORAL
Status: DISCONTINUED | OUTPATIENT
Start: 2023-09-02 | End: 2023-09-03 | Stop reason: HOSPADM

## 2023-09-02 RX ORDER — CEPHALEXIN 500 MG/1
500 CAPSULE ORAL 4 TIMES DAILY
COMMUNITY
Start: 2023-08-31 | End: 2023-09-12

## 2023-09-02 RX ORDER — MONTELUKAST SODIUM 4 MG/1
1 TABLET, CHEWABLE ORAL 2 TIMES DAILY
Status: DISCONTINUED | OUTPATIENT
Start: 2023-09-02 | End: 2023-09-03 | Stop reason: HOSPADM

## 2023-09-02 RX ORDER — CEPHALEXIN 500 MG/1
500 CAPSULE ORAL EVERY 6 HOURS SCHEDULED
Status: DISCONTINUED | OUTPATIENT
Start: 2023-09-02 | End: 2023-09-03 | Stop reason: HOSPADM

## 2023-09-02 RX ORDER — OXYBUTYNIN CHLORIDE 5 MG/1
5 TABLET ORAL 2 TIMES DAILY
Status: DISCONTINUED | OUTPATIENT
Start: 2023-09-02 | End: 2023-09-03 | Stop reason: HOSPADM

## 2023-09-02 RX ORDER — LOSARTAN POTASSIUM 50 MG/1
50 TABLET ORAL DAILY
Status: DISCONTINUED | OUTPATIENT
Start: 2023-09-02 | End: 2023-09-03 | Stop reason: HOSPADM

## 2023-09-02 RX ORDER — PANTOPRAZOLE SODIUM 40 MG/1
40 TABLET, DELAYED RELEASE ORAL
Status: DISCONTINUED | OUTPATIENT
Start: 2023-09-02 | End: 2023-09-03 | Stop reason: HOSPADM

## 2023-09-02 RX ORDER — CARBAMAZEPINE 100 MG/1
300 TABLET, EXTENDED RELEASE ORAL 2 TIMES DAILY
Status: DISCONTINUED | OUTPATIENT
Start: 2023-09-02 | End: 2023-09-03 | Stop reason: HOSPADM

## 2023-09-02 RX ORDER — BUPROPION HYDROCHLORIDE 150 MG/1
300 TABLET ORAL DAILY
Status: DISCONTINUED | OUTPATIENT
Start: 2023-09-02 | End: 2023-09-02 | Stop reason: SDUPTHER

## 2023-09-02 RX ORDER — ALBUTEROL SULFATE 90 UG/1
2 AEROSOL, METERED RESPIRATORY (INHALATION) EVERY 6 HOURS PRN
Status: DISCONTINUED | OUTPATIENT
Start: 2023-09-02 | End: 2023-09-03 | Stop reason: HOSPADM

## 2023-09-02 RX ORDER — VENLAFAXINE 37.5 MG/1
37.5 TABLET ORAL DAILY
Status: DISCONTINUED | OUTPATIENT
Start: 2023-09-02 | End: 2023-09-03 | Stop reason: HOSPADM

## 2023-09-02 RX ORDER — POTASSIUM CHLORIDE 20 MEQ/1
40 TABLET, EXTENDED RELEASE ORAL ONCE
Status: COMPLETED | OUTPATIENT
Start: 2023-09-02 | End: 2023-09-02

## 2023-09-02 RX ORDER — FUROSEMIDE 20 MG/1
20 TABLET ORAL DAILY
Status: DISCONTINUED | OUTPATIENT
Start: 2023-09-02 | End: 2023-09-03 | Stop reason: HOSPADM

## 2023-09-02 RX ORDER — ACETAMINOPHEN 325 MG/1
650 TABLET ORAL EVERY 6 HOURS PRN
Status: DISCONTINUED | OUTPATIENT
Start: 2023-09-02 | End: 2023-09-03 | Stop reason: HOSPADM

## 2023-09-02 RX ORDER — CEPHALEXIN 500 MG/1
500 CAPSULE ORAL EVERY 6 HOURS SCHEDULED
Status: DISCONTINUED | OUTPATIENT
Start: 2023-09-02 | End: 2023-09-02

## 2023-09-02 RX ADMIN — METOPROLOL TARTRATE 25 MG: 25 TABLET, FILM COATED ORAL at 14:15

## 2023-09-02 RX ADMIN — ONDANSETRON 4 MG: 4 TABLET, ORALLY DISINTEGRATING ORAL at 11:21

## 2023-09-02 RX ADMIN — PANTOPRAZOLE SODIUM 40 MG: 40 TABLET, DELAYED RELEASE ORAL at 17:16

## 2023-09-02 RX ADMIN — TOPIRAMATE 150 MG: 100 TABLET, FILM COATED ORAL at 17:16

## 2023-09-02 RX ADMIN — DIVALPROEX SODIUM 750 MG: 250 TABLET, DELAYED RELEASE ORAL at 21:27

## 2023-09-02 RX ADMIN — LURASIDONE HYDROCHLORIDE 60 MG: 20 TABLET, COATED ORAL at 08:33

## 2023-09-02 RX ADMIN — LURASIDONE HYDROCHLORIDE 60 MG: 20 TABLET, COATED ORAL at 21:25

## 2023-09-02 RX ADMIN — ROPINIROLE HYDROCHLORIDE 1 MG: 1 TABLET, FILM COATED ORAL at 21:25

## 2023-09-02 RX ADMIN — OXYBUTYNIN CHLORIDE 5 MG: 5 TABLET ORAL at 17:16

## 2023-09-02 RX ADMIN — VENLAFAXINE 37.5 MG: 37.5 TABLET ORAL at 08:31

## 2023-09-02 RX ADMIN — CEPHALEXIN 500 MG: 500 CAPSULE ORAL at 02:44

## 2023-09-02 RX ADMIN — CEPHALEXIN 500 MG: 500 CAPSULE ORAL at 05:25

## 2023-09-02 RX ADMIN — PERFLUTREN 0.4 ML/MIN: 6.52 INJECTION, SUSPENSION INTRAVENOUS at 15:48

## 2023-09-02 RX ADMIN — METOPROLOL TARTRATE 25 MG: 25 TABLET, FILM COATED ORAL at 02:44

## 2023-09-02 RX ADMIN — CEPHALEXIN 500 MG: 500 CAPSULE ORAL at 17:16

## 2023-09-02 RX ADMIN — COLESTIPOL HYDROCHLORIDE 1 G: 1 TABLET, FILM COATED ORAL at 19:38

## 2023-09-02 RX ADMIN — OXYBUTYNIN CHLORIDE 5 MG: 5 TABLET ORAL at 08:31

## 2023-09-02 RX ADMIN — BUPROPION HYDROCHLORIDE 450 MG: 150 TABLET, FILM COATED, EXTENDED RELEASE ORAL at 08:31

## 2023-09-02 RX ADMIN — HEPARIN SODIUM 15 UNITS/KG/HR: 10000 INJECTION, SOLUTION INTRAVENOUS at 10:42

## 2023-09-02 RX ADMIN — MEMANTINE 10 MG: 10 TABLET ORAL at 08:31

## 2023-09-02 RX ADMIN — DIVALPROEX SODIUM 750 MG: 250 TABLET, DELAYED RELEASE ORAL at 08:34

## 2023-09-02 RX ADMIN — LEVOTHYROXINE SODIUM 125 MCG: 125 TABLET ORAL at 05:25

## 2023-09-02 RX ADMIN — CEPHALEXIN 500 MG: 500 CAPSULE ORAL at 11:21

## 2023-09-02 RX ADMIN — FUROSEMIDE 20 MG: 20 TABLET ORAL at 08:31

## 2023-09-02 RX ADMIN — PANTOPRAZOLE SODIUM 40 MG: 40 TABLET, DELAYED RELEASE ORAL at 08:31

## 2023-09-02 RX ADMIN — LOSARTAN POTASSIUM 50 MG: 50 TABLET, FILM COATED ORAL at 08:31

## 2023-09-02 RX ADMIN — CARBAMAZEPINE 300 MG: 100 TABLET, EXTENDED RELEASE ORAL at 17:19

## 2023-09-02 RX ADMIN — AMLODIPINE BESYLATE 5 MG: 5 TABLET ORAL at 08:31

## 2023-09-02 RX ADMIN — CARBAMAZEPINE 300 MG: 100 TABLET, EXTENDED RELEASE ORAL at 08:34

## 2023-09-02 RX ADMIN — TOPIRAMATE 150 MG: 100 TABLET, FILM COATED ORAL at 08:31

## 2023-09-02 RX ADMIN — POTASSIUM CHLORIDE 40 MEQ: 1500 TABLET, EXTENDED RELEASE ORAL at 08:31

## 2023-09-02 NOTE — ASSESSMENT & PLAN NOTE
· History of substance abuse methamphetamine, marijuana admitting using at least 2 lines of methamphetamine today  · Check UDS

## 2023-09-02 NOTE — H&P
[address] [facility]  H&P  Name: Hipolito Rodas 47 y.o. female I MRN: [id number]  Unit/Bed#: S -01 I Date of Admission: 9/1/2023   Date of Service: 9/2/2023 I Hospital Day: 0      Assessment/Plan   * Acute saddle pulmonary embolism ([address])  Assessment & Plan  · POA started dyspnea, operatory chest tightness worsening upon ambulation no amilcar chest pain, fevers, chills, cough. · No personal history DVT/PE  · At the ED D-dimer elevated over 20.00  · CT PE significant for "Large saddle embolus is seen which extends into the right and left lower lobar and several bilateral lower lobe segmental pulmonary arterial branches. There is associated evidence of right heart strain."  · At the ED patient started on IV heparin drip and subsequently critical care attending was contacted no indication for thrombectomy patient okay to be admitted to [address] [place] [address]. · Plan  · Continue telemetry  · Continue monitoring respiratory status likely patient has remained off oxygen and not hypoxic on room air. · Continue IV heparin drip consider switching to Lovenox 1 mg/kg versus DOACs in the next 24 -48 hours. Prescription for Eliquis starter pack sent for price check.   · Check echocardiogram  · Check vas duplex ultrasound  · Consider further work-up coagulopathy given strong family history of mother side for coagulopathy  · Patient will benefit outpatient hematology for further management evaluation upon discharge suspect patient will need lifetime anticoagulation  · Holding home estradiol given acute PE    Acute respiratory failure with hypoxia ([address])  Assessment & Plan  · POA noted saturating 90% on room air which was placed on 2 L nasal cannula  · Likely in the setting of acute PE with associated history of MAG versus obesity hypoventilation syndrome  · Continue oxygen as her mentation keep oxygen>88%  · BiPAP at bedtime    Elevated troponin  Assessment & Plan  · POA troponin noted  awaiting 4-hour collection  · Likely type 2 non MI troponin elevation in the setting of acute saddle PE with right heart strain on CT PE versus stimulant drug induce    · Plan  · Continue trending troponin home to plateau  · Check echocardiogram  · Continue IV heparin for PE    Intentional self-harm by unspecified sharp object, sequela ([address])  Assessment & Plan  · History suicide attempt back in July 2022 with urine history of self mutilating. · Patient currently follows with Behavioral Health on the outpatient setting. · POA expressed inability to reach out to [facility] team today and subsequently admitting self mutilation with utility knife blade to cut her left anterior forearm. · At the ED patient able to reach out to [address] [facility] team.  · Upon my evaluation patient noted anxious,denied active suicidal ideation however did mention and admits history of self-mutilation and methamphetamine use due to increased anxiety. Upon questioning about current CODE STATUS explaining CPR and intubation patient noted significantly anxious, avoiding eye contact however answering Level 1 as her CODE STATUS. · Plan:  · Start 1:1 given hx suicide attempt and intentional self-mutilation. · Inpatient Consult behavioral health/psych evaluation appreciate recommendations while inpatient  · Concern during conversation about CODE STATUS unclear intrusive thoughts may have taken placed in terms of SI. Schizoaffective disorder, bipolar type [facility])  Assessment & Plan  · History of schizoaffective disorder bipolar  · Unable to confirm patient current  dosage of Depakote and regimen for patient at this time given patient has multiple dosage prescribed however per chart review last behavioral health encounter July 2022 patient was started on Depakote 750 mg twice daily. · patients father will bring home medication pre pack informtion to verify given patient gets medication already organized for self administering daily.     · Plan  · Continue home Depakote 750 mg twice daily  · Continue home carbamazepine 300 mg twice daily  · Continue home Wellbutrin 300 mg and 150 mg tablet  · Continue home lurasidone 60 mg every morning, every afternoon  · Continue home Effexor 37.5 mg once a day  · Rest of plan as above    Substance abuse ([facility])  Assessment & Plan  · History of substance abuse methamphetamine, marijuana admitting using at least 2 lines of methamphetamine today  · Check UDS      Benign essential hypertension  Assessment & Plan  · History hypertension  Blood Pressure: 148/89  · Acceptable    · Plan  · Continue home Norvasc 5 mg once a day,   · continue home Lasix 20 mg once a day,   · continue home losartan 50 mg once a day,   · continue home Lopressor 25 mg twice daily    COPD (chronic obstructive pulmonary disease) ([facility])  Assessment & Plan  · Continue home albuterol as needed every 6 hours  · Continue Arnuity Ellipta for home Flovent per pharmacy formulary    MAG (obstructive sleep apnea)  Assessment & Plan  · Continue BiPAP at bedtime    Chronic migraine without aura without status migrainosus, not intractable  Assessment & Plan  · Continue home Topamax 150 mg twice daily       VTE Pharmacologic Prophylaxis: VTE Score: 8 High Risk (Score >/= 5) - Pharmacological DVT Prophylaxis Ordered: heparin drip. Sequential Compression Devices Ordered. Code Status: Level 1 - Full Code Patient  Discussion with family: Patient declined call to . Anticipated Length of Stay: Patient will be admitted on an inpatient basis with an anticipated length of stay of greater than 2 midnights secondary to Acute saddle pulmonary embolism.     Chief Complaint: Shortness of breath chest tightness    History of Present Illness:  Aly Curtis is a 47 y.o. female with a PMH of schizoaffective disorder/bipolar with suicidal history, substance abuse, COPD, hypertension, MAG, hypothyroidism, hyperlipidemia, obesity who presents after noted dyspnea on exertion that started early afternoon prior to admission with associated chest tightness mostly during inspiration. Patient denies any recent traveling, no recent surgeries however does endorsed sedentary life. Patient endorsed that he has a strong family history of DVT/PE in her grandmother and her mother however is unaware of any actual diagnosis and patient has not been worked up for the same. Patient endorsed chronic lower extremity edema and most recently was noted by her therapist swelling bilaterally however denied any pain or tenderness. Patient denies any fevers, chills, chest pain, cough, abdominal pain as well as no urinary symptoms. Patient endorsed history of substance abuse including methamphetamine and marijuana endorsing that her last intake of methamphetamine this afternoon due to anxiety, she does not feel her symptoms are related due to her meth as she does consume daily usage and this has not changed recently. Patient endorsed that has an appointment with her therapist on Monday for which "possibility to start on ADHD medication in order for her to wean herself off her methamphetamine use"    Patient also endorsed that has self mutilated since teen years has ACT team and has attempted to contact them in the morning however unable to reach them up subsequently cutting herself using "utility knife blade" and does this to relieve stressed however denied doing so as coping mechanism and not as a tool to "kill herself". Upon my evaluation patient did endorsed history of suicide attempt, current history of substance abuse and self-mutilation. Endorsed that follows closely with her therapist for which will have an appointment on Monday. Also endorsed compliance with medications, her medications are prepacked for which she does not recall doses and contact with does endorse compliance with what ever the agency packs herself. She is denying actively suicidal ideation or intrusive thoughts at this time.     Of note endorse that was recently seen in the emergency department for which was started on Keflex for right second toe cellulitis and fecal disimpacted with subsequent loose stools although endorse that this has been chronic. At the ED CT a PE significant for acute saddle pulmonary embolism which subsequently was started on IV heparin drip. Patient was also noted and she was with associated bilateral upper and lower extremity tremors subsequently able to contact her ACT team providing reassurance and assistance with improvement in her anxiety. Review of Systems:  Review of Systems   Constitutional: Negative for chills and fever. HENT: Negative for ear pain and sore throat. Eyes: Negative for pain and visual disturbance. Respiratory: Positive for shortness of breath. Negative for cough. Cardiovascular: Positive for leg swelling. Negative for chest pain and palpitations. Gastrointestinal: Positive for constipation and diarrhea. Negative for abdominal distention, abdominal pain, nausea and vomiting. Genitourinary: Positive for difficulty urinating. Negative for dysuria, flank pain and hematuria. Chronic difficulty urinary   Musculoskeletal: Positive for arthralgias. Negative for back pain and joint swelling. Skin: Positive for wound. Negative for color change and rash. Neurological: Positive for tremors. Negative for dizziness, seizures, syncope, light-headedness and headaches. Psychiatric/Behavioral: Positive for self-injury. Negative for suicidal ideas. The patient is nervous/anxious. All other systems reviewed and are negative.       Past Medical and Surgical History:   Past Medical History:   Diagnosis Date   • Anxiety    • Arthritis     oa cassandra knees   • Asthma     good control- no medications   • Yan's esophagus    • Bipolar affective disorder ([address])    • Cannabis abuse with unspecified 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[address])    • Chronic pain disorder     lumbar   • COPD (chronic obstructive pulmonary disease) (HCC)    • CPAP (continuous positive airway pressure) dependence    • Degenerative disc disease at L5-S1 level    • Deliberate self-cutting     9/15/22per pt has not done recently-- does see a therapist and psychiatrist regularly   • Depression 09/16/2008   • Disease of thyroid gland     hypo   • MARTINEZ (dyspnea on exertion)     per pt "has had this with exertion and not new"   • Drug overdose 10/28/2008    suicide attempt and again drug overdose 7/2022--[facility]Medical floor and than transferred to [facility]   • Dysphagia    • Dyspnea    • Edema     BLE   • Family history of blood clots    • GERD (gastroesophageal reflux disease)    • Headache(784.0)    • Headache, tension-type    • High blood pressure    • History of COVID-19 12/30/2020    Symptoms started on 12/30/2020 and then got worse. Today she is feeling a little bit better. She is a candidate for monoclonal antibody infusion and set for 1/6/21 @ 1pm at [facility]. 01/07/21 - telemedicine -    • Hyperlipidemia    • Hypertension    • Knee pain, bilateral     Especially right   • Medical marijuana use    • Memory loss    • Migraines    • Obesity    • Overactive bladder    • Sjogren's disease ([address])    • Sleep apnea    • Stress incontinence    • Suicidal ideations    • Teeth missing    • Tremor     per pt Tremors of Right Leg and both Arms   • Visual impairment    • Wears glasses        Past Surgical History:   Procedure Laterality Date   • BREAST CYST EXCISION Right 1989   • CARPAL TUNNEL RELEASE Left    • CHOLECYSTECTOMY  05/2003    Laparoscopic   • COLONOSCOPY      01/12/2009   • DILATION AND CURETTAGE OF UTERUS     • ELBOW SURGERY Left     x2.  No hardware   • ESOPHAGOGASTRODUODENOSCOPY     • FOOT SURGERY Left     Plantar fasciotomy   • HERNIA REPAIR     • HYSTERECTOMY      laporoscopic, partial   • KNEE ARTHROSCOPY Bilateral    • OOPHORECTOMY Left 10/2015   • [place] GASTROCNEMIUS RECESSION Left 02/24/2021    Procedure: Kaylyn Hendricks GASTROC OPEN;  Surgeon: Sudeep Price DPM;  Location: [address] [place] [address] MAIN OR;  Service: Podiatry   • VT RPR UMBILICAL HRNA 5 YRS/> REDUCIBLE N/A 04/24/2019    Procedure: REPAIR HERNIA UMBILICAL LAPAROSCOPIC W/ ROBOTICS;  Surgeon: Norval Hodgkin, MD;  Location: [facility] Main OR;  Service: General   • VT SPHINCTEROTOMY ANAL DIVISION SPHINCTER [address] [facility] [address] N/A 08/31/2018    Procedure: EUA, LEFT PARTIAL INTERNAL SPHINCTEROTOMY;  Surgeon: William Sinclair MD;  Location: [address] [place] [address] MAIN OR;  Service: Colorectal   • VT TNOT ELBOW LATERAL/MEDIAL DEBRIDE OPEN TDN RPR Left 09/20/2022    Procedure: RELEASE EPICONDYLAR ELBOW MEDIAL;  Surgeon: Lashay Adams MD;  Location: [facility] MAIN OR;  Service: Orthopedics   • REDUCTION MAMMAPLASTY Bilateral 1999   • REPAIR LACERATION Left     left hand-5/18/2009   • REPLACEMENT TOTAL KNEE Right    • ROTATOR CUFF REPAIR Left    • TONSILECTOMY AND ADNOIDECTOMY     • US GUIDED MSK PROCEDURE  04/22/2021   • VASCULAR SURGERY     • VEIN LIGATION Bilateral    • WISDOM TOOTH EXTRACTION         Meds/Allergies:  Prior to Admission medications    Medication Sig Start Date End Date Taking?  Authorizing Provider   albuterol (Ventolin HFA) 90 mcg/act inhaler Inhale 2 puffs every 6 (six) hours as needed for wheezing 6/20/22  Yes JHONATAN Rondon   amLODIPine (NORVASC) 5 mg tablet TAKE 1 TABLET (5 MG TOTAL) BY MOUTH DAILY 7/12/23  Yes Phylliss Curling, CRNP   buPROPion (WELLBUTRIN XL) 150 mg 24 hr tablet Take 1 tablet (150 mg total) by mouth daily 7/26/22 9/2/23 Yes Sami Godinez,    buPROPion (WELLBUTRIN XL) 300 mg 24 hr tablet Take 300 mg by mouth daily 9/16/22  Yes Historical Provider, MD   cephalexin (KEFLEX) 500 mg capsule Take 500 mg by mouth 4 (four) times a day 8/31/23 9/7/23 Yes Historical Provider, MD   colestipol (COLESTID) 1 g tablet Take 1 tablet (1 g total) by mouth 2 (two) times a day 7/16/22 9/2/23 Yes Shannon Navarro MD   Diclofenac Sodium (VOLTAREN) 1 % Apply 2 g topically 4 (four) times a day 7/17/23  Yes Toño Dash Flavia Elias   estradiol (ESTRACE) 0.5 MG tablet Take 1 tablet (0.5 mg total) by mouth daily 5/4/23  Yes Jeffrey Foster DO   Fesoterodine Fumarate ER (Toviaz) 4 MG TB24 Take 1 tablet by mouth daily   Yes Historical Provider, MD   furosemide (LASIX) 20 mg tablet TAKE 1 TABLET BY MOUTH DAILY 8/14/23  Yes Verner Law, CRNP   haloperidol decanoate (Haldol Decanoate) 50 mg/mL injection every 30 (thirty) days 8/3/22  Yes Historical Provider, MD   levothyroxine (Euthyrox) 125 mcg tablet Take 1 tablet (125 mcg total) by mouth daily in the early morning 3/16/23  Yes Sherrine Lennox, CRNP   losartan (COZAAR) 50 mg tablet TAKE ONE TABLET BY MOUTH DAILY 3/29/23  Yes Sherrine Lennox, CRNP   Lurasidone HCl 60 MG TABS Take 1 tablet (60 mg total) by mouth 2 (two) times a day TAKEN IN THE MORNING AND AT NIGHT-----[place] 1/26/23 9/2/23 Yes Verner Law, CRNP   metoprolol tartrate (LOPRESSOR) 25 mg tablet TAKE ONE TABLET BY MOUTH EVERY 12 HOURS 6/21/23  Yes Verner Law, CRNP   ondansetron (ZOFRAN-ODT) 4 mg disintegrating tablet Take 1 tablet (4 mg total) by mouth every 6 (six) hours as needed for nausea or vomiting 8/18/23  Yes Verner Law, CRNP   pilocarpine (SALAGEN) 5 mg tablet Take 5 mg by mouth 2 (two) times a day   Yes Historical Provider, MD   RABEprazole (ACIPHEX) 20 MG tablet Take 20 mg by mouth 2 (two) times a day   Yes Historical Provider, MD   rOPINIRole (REQUIP) 1 mg tablet  9/26/22  Yes Historical Provider, MD   topiramate (TOPAMAX) 200 MG tablet  1/18/23  Yes Historical Provider, MD   ammonium lactate (LAC-HYDRIN) 12 % cream  8/2/23   Historical Provider, MD   carBAMazepine (CARBATROL) 300 MG 12 hr capsule Take 1 capsule by mouth in the morning 11/23/22   Historical Provider, MD   carboxymethylcellulose (REFRESH PLUS) 0.5 % SOLN     Historical Provider, MD   Cholecalciferol (Vitamin D3) 125 MCG (5000 UT) CAPS Take by mouth in the morning 5/25/22   Historical Provider, MD   clotrimazole-betamethasone [facility] Plate) 1-0.05 % cream Apply topically 2 (two) times a day  Patient not taking: Reported on 8/10/2023 7/11/23   Ania Joshi DO   cyclobenzaprine (FLEXERIL) 10 mg tablet Take 1 tablet (10 mg total) by mouth daily at bedtime  Patient not taking: Reported on 9/2/2023 6/1/23   Daily Collins PA-C   cyproheptadine (PERIACTIN) 4 mg tablet     Historical Provider, MD   dexamethasone (DECADRON) 1 mg tablet     Historical Provider, MD   diazepam (VALIUM) 5 mg tablet Take 1 tablet 1 hour prior to MRI and may take additional tablet 15 minutes prior to MRI as needed for anxiety  Patient not taking: Reported on 9/2/2023 8/30/23   Tonya Hernandez,    Diclofenac Sodium (VOLTAREN) 1 % Apply 2 g topically 3 (three) times a day as needed (pain) For pain to affected area 5/5/23   Zoila Urbina PA-C   divalproex sodium (DEPAKOTE ER) 250 mg 24 hr tablet  9/26/22   Historical Provider, MD   divalproex sodium (DEPAKOTE ER) 500 mg 24 hr tablet 500 mg 2 (two) times a day 9/14/22   Historical Provider, MD   divalproex sodium (DEPAKOTE) 250 mg EC tablet Take 3 tablets (750 mg total) by mouth every 12 (twelve) hours 7/25/22 12/15/22  Al Crockett DO   ergocalciferol (ERGOCALCIFEROL) 1.25 MG (14602 UT) capsule     Historical Provider, MD   fluticasone (FLOVENT HFA) 110 MCG/ACT inhaler Inhale 2 puffs as needed Rinse mouth after use.   Patient not taking: Reported on 9/2/2023    Historical Provider, MD   furosemide (LASIX) 20 mg tablet Take 1 tablet (20 mg total) by mouth daily  Patient not taking: Reported on 8/10/2023 7/6/23   Marge Feeling, CRNP   hydrocortisone 2.5 % ointment Apply topically 2 (two) times a day 10/25/22   Driss Dark, CRNP   hydrocortisone 2.5 % ointment Apply topically 2 (two) times a day  Patient not taking: Reported on 8/18/2023 3/28/23   Driss Dark, CRNP   hydrOXYzine HCL (ATARAX) 25 mg tablet Take 1 tablet (25 mg total) by mouth every 6 (six) hours as needed for itching (mild anxiety)  Patient not taking: Reported on 9/2/2023 7/26/22 8/10/23  Brittni Ding DO   memantine (NAMENDA) 10 mg tablet     Historical Provider, MD   methocarbamol (ROBAXIN) 500 mg tablet Take 1 tablet (500 mg total) by mouth 3 (three) times a day as needed for muscle spasms  Patient not taking: Reported on 7/11/2023 5/31/23   Jalyn Ricci PA-C   mometasone (ELOCON) 0.1 % lotion Apply topically daily  Patient not taking: Reported on 7/11/2023 2/23/23   JHONATAN Mendoza   nystatin (MYCOSTATIN) powder Apply topically 2 (two) times a day  Patient not taking: Reported on 9/2/2023 8/18/23   JHONATAN Mendoza   phenazopyridine (PYRIDIUM) 100 mg tablet Take 1 tablet (100 mg total) by mouth 3 (three) times a day as needed for bladder spasms 3/23/23   JHONATAN Bernstein   predniSONE 20 mg tablet     Historical Provider, MD   rOPINIRole (REQUIP) 0.5 mg tablet Take by mouth daily at bedtime  Patient not taking: Reported on 9/2/2023 5/25/22   Historical Provider, MD   topiramate (TOPAMAX) 100 mg tablet Take 1.5 tablets (150 mg total) by mouth 2 (two) times a day 1/26/23 2/25/23  JHONATAN Mendoza   topiramate (TOPAMAX) 50 MG tablet Take 50 mg by mouth 2 (two) times a day 10/12/22   Historical Provider, MD   triamcinolone (KENALOG) 0.1 % ointment     Historical Provider, MD   trospium chloride (SANCTURA) 20 mg tablet Take 20 mg by mouth 2 (two) times a day 1/12/23 1/12/24  Historical Provider, MD   venlafaxine [facility]) 37.5 mg tablet Take 37.5 mg by mouth daily 10/19/22   Historical Provider, MD     Lab review, medications with patient personally and epic encounters. Allergies: Allergies   Allergen Reactions   • Percocet [Oxycodone-Acetaminophen] Headache     Severe headaches   (denies issues with Tylenol)   • Povidone Iodine Rash     Unsure if betadine or gauze post surgical. Got rash on leg.    Has  Had itchy rashes after every surgery prep and IV insertion   • Chlorhexidine Rash     Per pt "able to use the liquid soap--thinkd reaction from the sponges or wipes"       Social History:  Marital Status: Single   Occupation:   Patient Pre-hospital Living Situation: Alone  Patient Pre-hospital Level of Mobility: walks  Patient Pre-hospital Diet Restrictions: None  Substance Use History:   Social History     Substance and Sexual Activity   Alcohol Use Not Currently    Comment: Recovering alcoholic     Social History     Tobacco Use   Smoking Status Former   • Packs/day: 2.00   • Years: 30.00   • Total pack years: 60.00   • Types: Cigarettes   • Start date: 5   • Quit date: 2018   • Years since quittin.6   Smokeless Tobacco Never   Tobacco Comments    Started at age 14. Social History     Substance and Sexual Activity   Drug Use Yes   • Types: Marijuana, Methamphetamines    Comment: Medical marijuana       Family History:  Family History   Problem Relation Age of Onset   • Kidney cancer Mother    • Diabetes Mother    • Depression Mother    • Stroke Mother    • Arthritis Mother    • Cancer Mother    • Psychiatric Illness Mother    • No Known Problems Father    • No Known Problems Sister    • No Known Problems Maternal Grandmother    • No Known Problems Maternal Grandfather    • No Known Problems Paternal Grandmother    • No Known Problems Paternal Grandfather    • Colon cancer Maternal Uncle    • Colon cancer Maternal Uncle    • Colon cancer Paternal Uncle    • Colon cancer Family    • Alcohol abuse Sister    • Asthma Sister        Physical Exam:     Vitals:   Blood Pressure: 148/89 (23 011)  Pulse: 81 (23 011)  Temperature: 98.4 °F (36.9 °C) (23)  Temp Source: Axillary (23)  Respirations: 20 (23 2200)  Weight - Scale: 133 kg (293 lb 6.9 oz) (23)  SpO2: 96 % (23)    Physical Exam  Vitals and nursing note reviewed. Constitutional:       General: She is not in acute distress. Appearance: She is well-developed. She is obese. She is not ill-appearing.    HENT:      Head: Normocephalic and atraumatic. Right Ear: External ear normal.      Left Ear: External ear normal.      Nose: Nose normal.      Mouth/Throat:      Mouth: Mucous membranes are moist.   Eyes:      General: No scleral icterus. Extraocular Movements: Extraocular movements intact. Conjunctiva/sclera: Conjunctivae normal.      Pupils: Pupils are equal, round, and reactive to light. Cardiovascular:      Rate and Rhythm: Normal rate and regular rhythm. Pulses: Normal pulses. Heart sounds: Normal heart sounds. No murmur heard. Pulmonary:      Effort: Pulmonary effort is normal. No respiratory distress. Breath sounds: Normal breath sounds. Abdominal:      General: Bowel sounds are normal. There is no distension. Palpations: Abdomen is soft. Tenderness: There is no abdominal tenderness. There is no right CVA tenderness or left CVA tenderness. Comments: Abdomen obese however soft, non tender to palpation. Musculoskeletal:         General: No swelling. Cervical back: Normal range of motion and neck supple. Right lower leg: Edema present. Left lower leg: Edema present. Right foot: Bunion present. Left foot: Bunion present. Feet:       Comments: Bilateral lower extremity nonpitting edema with associated lipedema   Skin:     General: Skin is warm and dry. Capillary Refill: Capillary refill takes less than 2 seconds. Neurological:      General: No focal deficit present. Mental Status: She is alert. Psychiatric:         Attention and Perception: Attention normal.         Mood and Affect: Mood is anxious. Speech: Speech normal.         Behavior: Behavior normal. Behavior is cooperative. Thought Content: Thought content normal. Thought content is not delusional. Thought content does not include homicidal or suicidal ideation. Thought content does not include suicidal plan.          Cognition and Memory: Cognition normal. Judgment: Judgment normal.          Additional Data:     Lab Results:  Results from last 7 days   Lab Units 09/01/23  2100   WBC Thousand/uL 9.86   HEMOGLOBIN g/dL 14.5   HEMATOCRIT % 45.4   PLATELETS Thousands/uL 152   NEUTROS PCT % 57   LYMPHS PCT % 28   MONOS PCT % 12   EOS PCT % 1     Results from last 7 days   Lab Units 09/01/23  2100   SODIUM mmol/L 141   POTASSIUM mmol/L 3.9   CHLORIDE mmol/L 108   CO2 mmol/L 22   BUN mg/dL 14   CREATININE mg/dL 0.64   ANION GAP mmol/L 11   CALCIUM mg/dL 8.8   ALBUMIN g/dL 4.0   TOTAL BILIRUBIN mg/dL 0.36   ALK PHOS U/L 52   ALT U/L 14   AST U/L 15   GLUCOSE RANDOM mg/dL 81     Results from last 7 days   Lab Units 09/01/23  2100   INR  1.02                   Lines/Drains:  Invasive Devices     Peripheral Intravenous Line  Duration           Peripheral IV 09/01/23 Left;Ventral (anterior) Forearm <1 day                    Imaging: Reviewed radiology reports from this admission including: chest CT scan  CTA ED chest PE study   Final Result by Adolfo Garrett DO (09/01 2336)      Large saddle embolus is seen which extends into the right and left lower lobar and several bilateral lower lobe segmental pulmonary arterial branches. There is associated evidence of right heart strain. Subtle lobulated contour of the liver may suggest a component of hepatic cirrhosis. Other findings as above. I personally discussed this study with Dr. Teddy Kitchen in the [facility] ED ED on 9/1/2023 11:28 PM.            Workstation performed: [id number]         XR chest 1 view portable    (Results Pending)   VAS lower limb venous duplex study, complete bilateral    (Results Pending)       EKG and Other Studies Reviewed on Admission:   · EKG: No EKG obtained. ** Please Note: This note has been constructed using a voice recognition system.  **

## 2023-09-02 NOTE — ASSESSMENT & PLAN NOTE
· POA noted saturating 90% on room air which was placed on 2 L nasal cannula  · Weaned off to room air  · Likely in the setting of acute PE with associated history of MAG versus obesity hypoventilation syndrome  · Continue oxygen as her mentation keep oxygen>88%  · BiPAP at bedtime

## 2023-09-02 NOTE — ASSESSMENT & PLAN NOTE
· History of schizoaffective disorder bipolar  · Unable to confirm patient current  dosage of Depakote and regimen for patient at this time given patient has multiple dosage prescribed however per chart review last behavioral health encounter July 2022 patient was started on Depakote 750 mg twice daily. · patients father will bring home medication pre pack informtion to verify given patient gets medication already organized for self administering daily.     · Plan  · Continue home Depakote 750 mg twice daily  · Continue home carbamazepine 300 mg twice daily  · Continue home Wellbutrin 300 mg and 150 mg tablet  · Continue home lurasidone 60 mg every morning, every afternoon  · Continue home Effexor 37.5 mg once a day  · Rest of plan as above

## 2023-09-02 NOTE — CONSULTS
TeleConsultation - [facility] Orestes Mason 47 y.o. female MRN: [id number]  Unit/Bed#: S -01 Encounter: [id number]        REQUIRED DOCUMENTATION:     1. This service was provided via Telemedicine. 2. Provider located at [facility].  3. TeleMed provider: Best Velazquez MD.  4. Identify all parties in room with patient during tele consult: Patient   5. Patient was then informed that this was a Telemedicine visit and that the exam was being conducted confidentially over secure lines. My office door was closed. No one else was in the room. Patient acknowledged consent and understanding of privacy and security of the Telemedicine visit, and gave us permission to have the assistant stay in the room in order to assist with the history and to conduct the exam.  I informed the patient that I have reviewed their record in Epic and presented the opportunity for them to ask any questions regarding the visit today. The patient agreed to participate. Discussed with Radhika Sandoval MD      Assessment/Plan     Present on Admission:  • Schizoaffective disorder, bipolar type ([address])  • COPD (chronic obstructive pulmonary disease) ([address])  • Substance abuse ([address])  • Benign essential hypertension  • MAG (obstructive sleep apnea)  • Chronic migraine without aura without status migrainosus, not intractable    Assessment:    Schizoaffective Disorder, bipolar type     Patient presents with a recent episode of nonsuicidal self-harm and some endorsed depressed mood but is well-established with the [facility] Team and recommend follow-up she has been in contact with them. Discussed with patient the use of naltrexone 50 mg daily for self-harm behaviors wishes to defer this to outpatient psychiatric team. Patient without any indication for voluntary or involuntary IP psychiatric admission.      Treatment Plan:    Planned Medication Changes:    -None     Current Medications:     Current Facility-Administered Medications Medication Dose Route Frequency Provider Last Rate   • acetaminophen  650 mg Oral Q6H PRN Amandeep Montes MD     • albuterol  2 puff Inhalation Q6H PRN Amandeep Montes MD     • amLODIPine  5 mg Oral Daily Amandeep Montes MD     • buPROPion  450 mg Oral Daily Amandeep Montes MD     • carBAMazepine  300 mg Oral BID Amandeep Montes MD     • cephalexin  500 mg Oral [facility] Amandeep Montes MD     • colestipol  1 g Oral BID Amandeep Montes MD     • Diclofenac Sodium  2 g Topical 4x Daily Amandeep Montes MD     • divalproex sodium  750 mg Oral Q12H [facility] [facility], [place]     • fluticasone  1 puff Inhalation Daily Amandeep Montes MD     • furosemide  20 mg Oral Daily Amandeepcollin Montes MD     • heparin (porcine)  3-30 Units/kg/hr (Order-Specific) Intravenous Titrated Oseas Hillman MD 15 Units/kg/hr (09/02/23 1042)   • heparin (porcine)  10,000 Units Intravenous Q6H PRN Oseas Hillman MD     • heparin (porcine)  5,000 Units Intravenous Q6H PRN Oseas Hillman MD     • levothyroxine  125 mcg Oral Early Morning Amandeep Montes MD     • losartan  50 mg Oral Daily Amandeep Montes MD     • lurasidone  60 mg Oral BID Amandeep Montes MD     • memantine  10 mg Oral Daily Amandeep Montes MD     • metoprolol tartrate  25 mg Oral Q12H Amandeep Montes MD     • ondansetron  4 mg Oral Q6H PRN Amandeep Montes MD     • oxybutynin  5 mg Oral BID Amandeep Montes MD     • pantoprazole  40 mg Oral BID AC Amandeep Montes MD     • rOPINIRole  1 mg Oral HS Amandeep Montes MD     • topiramate  150 mg Oral BID Amandeep Montes MD     • venlafaxine  37.5 mg Oral Daily Amandeep Montes MD         Risks / Benefits of Treatment:    Risks, benefits, and possible side effects of medications explained to patient and patient verbalizes understanding. Other treatment modalities recommended as indicated:    · psychotherapy  · outpatient referral      Consults  Physician Requesting Consult: Ivan Elmore MD  Principal Problem:Acute saddle pulmonary embolism [facility])    Reason for Consult: Psych Evaluation      History of Present Illness      Patient reports that she has been feeling depressed but she has an ACT team and that she contacted the on call team and that she already spoke to someone. Patient states that she had an episode of cutting for relief but had no intention to kill herself. Patient states that her care team is aware of her difficulties as well as daily meth use. Patient states that she has been achieving more clean days and hopes to get on medication for ADHD. Patient reports that she had 3 weeks where she went without cutting. Patient denied any current SI/HI/AVH or other acute psychiatric complaints. Psychiatric Review Of Systems:    sleep: no  appetite changes: yes  weight changes: no  energy/anergy: yes  interest/pleasure/anhedonia: yes  somatic symptoms: no  anxiety/panic: yes  geovany: no  guilty/hopeless: no  self injurious behavior/risky behavior: no    Historical Information     Past Psychiatric History:     Psychiatric Hospitalizations:   • Several past inpatient psychiatric admissions  Outpatient Treatment History:   • Yes  Suicide Attempts:   • Yes, Several attempts by overdose  History of self-harm:   • Yes, history of self-abusive behavior  Violence History:   • no  Past Psychiatric medication trials: Unsure     Substance Abuse History: Methampetamine daily intranasal and cannabis. Patient denied any history of other illicit substance use or alcohol use.         Family Psychiatric History:  Denied         Social History:    Education: high school diploma/GED  Learning Disabilities: Denied  Marital history: single  Children: Denied  Living arrangement, social support: The patient alone. Occupational History: on permanent disability  Functioning Relationships: good support system. Other Pertinent History: None    Traumatic History:     Abuse: None  Other Traumatic Events: none    Past Medical History:   Diagnosis Date   • Anxiety    • Arthritis     oa cassandra knees   • Asthma     good control- no medications   • Yan's esophagus    • Bipolar affective disorder ([address])    • Cannabis abuse with unspecified 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[address])    • Chronic pain disorder     lumbar   • COPD (chronic obstructive pulmonary disease) ([facility])    • CPAP (continuous positive airway pressure) dependence    • Degenerative disc disease at L5-S1 level    • Deliberate self-cutting     9/15/22per pt has not done recently-- does see a therapist and psychiatrist regularly   • Depression 09/16/2008   • Disease of thyroid gland     hypo   • MARTINEZ (dyspnea on exertion)     per pt "has had this with exertion and not new"   • Drug overdose 10/28/2008    suicide attempt and again drug overdose 7/2022--[facility] -Medical floor and than transferred to [facility]   • Dysphagia    • Dyspnea    • Edema     BLE   • Family history of blood clots    • GERD (gastroesophageal reflux disease)    • Headache(784.0)    • Headache, tension-type    • High blood pressure    • History of COVID-19 12/30/2020    Symptoms started on 12/30/2020 and then got worse. Today she is feeling a little bit better.   She is a candidate for monoclonal antibody infusion and set for 1/6/21 @ 1pm at [facility]. 01/07/21 - telemedicine -    • Hyperlipidemia    • Hypertension    • Knee pain, bilateral     Especially right   • Medical marijuana use    • Memory loss    • Migraines    • Obesity    • Overactive bladder    • Sjogren's disease ([address])    • Sleep apnea    • Stress incontinence    • Suicidal ideations    • Teeth missing    • Tremor     per pt Tremors of Right Leg and both Arms   • Visual impairment    • Wears glasses        Medical Review Of Systems:    Review of Systems    Meds/Allergies     all current active meds have been reviewed  Allergies   Allergen Reactions   • Percocet [Oxycodone-Acetaminophen] Headache     Severe headaches   (denies issues with Tylenol)   • Povidone Iodine Rash     Unsure if betadine or gauze post surgical. Got rash on leg. Has  Had itchy rashes after every surgery prep and IV insertion   • Chlorhexidine Rash     Per pt "able to use the liquid soap--thinkd reaction from the sponges or wipes"       Objective     Vital signs in last 24 hours:  Temp:  [98.2 °F (36.8 °C)-98.7 °F (37.1 °C)] 98.4 °F (36.9 °C)  HR:  [67-90] 67  Resp:  [17-28] 18  BP: (113-156)/(63-91) 131/77      Intake/Output Summary (Last 24 hours) at 9/2/2023 1208  Last data filed at 9/2/2023 1047  Gross per 24 hour   Intake 210 ml   Output 700 ml   Net -490 ml       Mental Status Evaluation:    Appearance:  age appropriate   Behavior:  normal   Speech:  normal pitch and normal volume   Mood:  normal   Affect:  normal   Language: naming objects   Thought Process:  normal   Associations intact associations   Thought Content:  normal   Perceptual Disturbances: None   Risk Potential: Suicidal Ideations none  Homicidal Ideations none  Potential for Aggression No   Sensorium:  person, place and time/date   Cognition:  recent and remote memory grossly intact   Consciousness:  alert    Attention: attention span and concentration were age appropriate   Intellect: within normal limits   Fund of Knowledge: awareness of current events: President   Insight:  limited   Judgment: limited   Muscle Strength:  Muscle Tone: normal NFT  normal   Gait/Station: normal gait/station   Motor Activity: no abnormal movements       Lab Results: I have personally reviewed all pertinent laboratory/tests results.      Most Recent Labs:   Lab Results   Component Value Date    WBC 9.92 09/02/2023    RBC 4.59 09/02/2023    HGB 14.1 09/02/2023    HCT 43.6 09/02/2023     (L) 09/02/2023 RDW 13.3 09/02/2023    NEUTROABS 5.75 09/01/2023    SODIUM 140 09/02/2023    K 3.6 09/02/2023     09/02/2023    CO2 22 09/02/2023    BUN 11 09/02/2023    CREATININE 0.51 (L) 09/02/2023    GLUC 92 09/02/2023    GLUF 88 01/05/2023    CALCIUM 8.2 (L) 09/02/2023    AST 15 09/01/2023    ALT 14 09/01/2023    ALKPHOS 52 09/01/2023    TP 7.0 09/01/2023    ALB 4.0 09/01/2023    TBILI 0.36 09/01/2023    CHOLESTEROL 234 (H) 03/09/2023    HDL 71 03/09/2023    TRIG 153 (H) 03/09/2023    LDLCALC 132 (H) 03/09/2023    [address] 163 03/09/2023    VALPROICTOT 59 03/09/2023    AMMONIA 32 03/09/2023    UOI1YBRQTYJA 4.510 (H) 03/15/2023    FREET4 0.99 03/15/2023    T3FREE 2.27 (L) 10/16/2019    PREGSERUM Negative 01/06/2022    RPR Non-Reactive 07/17/2022    HGBA1C 5.0 01/05/2023    EAG 97 01/05/2023       Imaging Studies: VAS lower limb venous duplex study, complete bilateral    Result Date: 9/2/2023  Narrative:  [facility] REPORT CLINICAL: Indications: Patient presents with recent discovery of pulmonary embolism and physician wants to determine potential source. Patient reports shortness of breath. Operative History: Patient denies any cardiovascular procedures Risk Factors The patient has history of HTN and previous smoking (quit 1-5yrs ago). She has no history of Diabetes. CONCLUSION: RIGHT LOWER LIMB: No evidence of acute or chronic deep vein thrombosis. No evidence of superficial thrombophlebitis noted. Doppler evaluation shows a normal response to augmentation maneuvers. . Popliteal, posterior tibial and anterior tibial arterial Doppler waveform's are triphasic. LEFT LOWER LIMB Acute, non occlusive DVT identified in the distal femoral, popliteal and (one) peroneal veins. No evidence of superficial thrombophlebitis noted. Doppler evaluation shows a normal response to augmentation maneuvers. Popliteal, posterior tibial and anterior tibial arterial Doppler waveform's are triphasic.   Technical findings were given to Tj Larkin MD via [employer]. XR chest 1 view portable    Result Date: 9/2/2023  Narrative: CHEST INDICATION:   SOB. COMPARISON: 2/10/2023 EXAM PERFORMED/VIEWS:  XR CHEST PORTABLE Single view FINDINGS: Cardiomediastinal silhouette appears unremarkable. The lungs are clear. No pneumothorax or pleural effusion. Osseous structures appear within normal limits for patient age. Impression: No acute cardiopulmonary disease. Findings are stable Workstation performed: [id number]     CTA ED chest PE study    Result Date: 9/1/2023  Narrative: CTA - CHEST WITH IV CONTRAST - PULMONARY ANGIOGRAM INDICATION:   Dyspnea, elevated dimer. COMPARISON: Chest x-ray dated the same TECHNIQUE: CTA examination of the chest was performed using angiographic technique according to a protocol specifically tailored to evaluate for pulmonary embolism. Multiplanar 2D reformatted images were created from the source data. In addition, coronal 3D MIP postprocessing was performed on the acquisition scanner. Radiation dose length product (DLP) for this visit:  745 mGy-cm . This examination, like all CT scans performed in the [facility], was performed utilizing techniques to minimize radiation dose exposure, including the use of iterative reconstruction and automated exposure control. IV Contrast:  75 mL of iohexol (OMNIPAQUE) FINDINGS: PULMONARY ARTERIAL TREE: Large saddle embolus is seen which extends into the right and left lower lobar and several bilateral lower lobe segmental pulmonary arterial branches. LUNGS: Lungs appear grossly clear. PLEURA:  Unremarkable. HEART/GREAT VESSELS: There is evidence of right heart strain. Mild atherosclerosis; no aortic aneurysm. MEDIASTINUM AND RUEL:  Unremarkable. CHEST WALL AND LOWER NECK:   Unremarkable. VISUALIZED STRUCTURES IN THE UPPER ABDOMEN: Status post cholecystectomy. Subtle lobulated contour of the liver may suggest a component of hepatic cirrhosis.  OSSEOUS STRUCTURES: No acute fracture or destructive osseous lesion. Impression: Large saddle embolus is seen which extends into the right and left lower lobar and several bilateral lower lobe segmental pulmonary arterial branches. There is associated evidence of right heart strain. Subtle lobulated contour of the liver may suggest a component of hepatic cirrhosis. Other findings as above. I personally discussed this study with Dr. Sami Nichols in the [facility] ED on 9/1/2023 11:28 PM. Workstation performed: [id number]     XR toe right great min 2 view    Result Date: 8/31/2023  Narrative: 3 view right second toe Indication: Nontraumatic right second toe pain, swelling, and redness. The views of the right second toe do not reveal a fracture or dislocation. Cortex of the bones appear intact with no radiographic evidence for osteomyelitis. Patient has a mild hallux valgus deformity. Impression: Impression: Unremarkable three-view right second toe. If there is concern for osteomyelitis other imaging study such as MRI is recommended as MRI would be more sensitive and specific compared to plain films. Workstation:[id number]    MRI cervical spine wo contrast    Result Date: 8/29/2023  Narrative: MRI CERVICAL SPINE WITHOUT CONTRAST incomplete examination DUE TO CLAUSTROPHOBIA INDICATION: M54.2: Cervicalgia. COMPARISON:  None. TECHNIQUE:  Multiplanar, multisequence imaging of the cervical spine was performed. . IMAGE QUALITY: Nondiagnostic examination. Only sagittal T2 weighted imaging was able to be obtained and is markedly degraded by patient motion. FINDINGS: Minimal anterior subluxation of C4 in relation to C5. No gross evidence of compression fracture or cord compression. Unfortunately the examination is otherwise nondiagnostic. Impression: Incomplete MRI as patient could only tolerate one sequence which is significantly degraded by patient motion.  Recommend patient return with premedication or with anesthesia to complete the examination. The study was marked in EPIC for significant notification. Workstation performed: [id number]     CT abdomen pelvis with contrast    Result Date: 8/22/2023  Narrative: CT ABDOMEN AND PELVIS WITH IV CONTRAST INDICATION:   Abdominal pain, acute, nonlocalized 2 weeks of nausea, generalized abd pain, 1 day of rectal pain and pressure. COMPARISON: CT abdomen pelvis dated October 17, 2008. TECHNIQUE:  CT examination of the abdomen and pelvis was performed. Multiplanar 2D reformatted images were created from the source data. This examination, like all CT scans performed in the [facility], was performed utilizing techniques to minimize radiation dose exposure, including the use of iterative reconstruction and automated exposure control. Radiation dose length product (DLP) for this visit:  1737 mGy-cm IV Contrast:  100 mL of iohexol (OMNIPAQUE) Enteric Contrast:  Enteric contrast was not administered. FINDINGS: ABDOMEN LOWER CHEST:  No clinically significant abnormality identified in the visualized lower chest. LIVER/BILIARY TREE:  Unremarkable. GALLBLADDER:  Gallbladder is surgically absent. SPLEEN:  Unremarkable. PANCREAS:  Unremarkable. ADRENAL GLANDS:  Unremarkable. KIDNEYS/URETERS:  No hydronephrosis or urinary tract calculus. One or more sharply circumscribed subcentimeter renal hypodensities are present, too small to accurately characterize, and statistically most likely benign findings. According to recent literature (Radiology 2019) no further workup of these findings is recommended. STOMACH AND BOWEL:  Unremarkable. APPENDIX:  A normal appendix was visualized. ABDOMINOPELVIC CAVITY:  No ascites. No pneumoperitoneum. No lymphadenopathy. VESSELS:  Unremarkable for patient's age. PELVIS REPRODUCTIVE ORGANS:  Unremarkable for patient's age. URINARY BLADDER:  Unremarkable. ABDOMINAL WALL/INGUINAL REGIONS:  Unremarkable. OSSEOUS STRUCTURES:  No acute fracture or destructive osseous lesion. Impression: No acute intra-abdominal abnormality. No free air or free fluid. Normal appendix visualized. Workstation performed: [id number]     EKG/Pathology/Other Studies:   Lab Results   Component Value Date    VENTRATE 81 09/02/2023    ATRIALRATE 81 09/02/2023    PRINT 200 09/02/2023    QRSDINT 92 09/02/2023    QTINT 366 09/02/2023    QTCINT 425 09/02/2023    PAXIS 58 09/02/2023    QRSAXIS -15 09/02/2023    TWAVEAXIS 38 09/02/2023        Code Status: Level 1 - Full Code  Advance Directive and Living Will:      Power of :    POLST:      Screenings:    1. Nutrition Screening  Nutrition Assessment (completed by Staff):      2. Pain Screening  Pain Screening: Pain Assessment  Pain Assessment Tool: 0-10  Pain Score: 0    3. Suicide Screening  ED Crisis Suicide Risk Assessment:        Counseling / Coordination of Care: Total floor / unit time spent today 30 minutes. Greater than 50% of total time was spent with the patient and / or family counseling and / or coordination of care. A description of the counseling / coordination of care: Direct Patient Care, Chart Review, and Documentation.

## 2023-09-02 NOTE — ED PROVIDER NOTES
History  Chief Complaint   Patient presents with   • Shortness of Breath     Pt reports MARTINEZ that started about an hour ago. Pt reports use of meth and is unsure if it is related. + hx of asthma, used albuterol today and states it didn't help      Patient is a 47year old female with history of methamphetamine use and asthma who presented to ED for SOB and chest discomfort. Initially, patient left without being seen but then came back shortly after and was evaluated by me. She stated that she woke up this morning feeling short of breath and initially thought it was due to her asthma but did not get any relief from her albuterol inhaler. Later she felt that the SOB felt different than her usual asthma SOB. She also endorsed feeling some vague chest discomfort and tightness around this time, substernal and non-radiating, no overt pain. She endorsed using methamphetamine earlier in the day but not any more than her usual amount. She also endorsed many stressors today and in the past few days and stated that she has been trying to quit the methamphetamine use but used today due to those stressors. She stated that she had never had this chest discomfort before or this specific manifestation of SOB. Endorses bilateral lower lower extremity edema but states that this is chronic, no pain or erythema. Prior to Admission Medications   Prescriptions Last Dose Informant Patient Reported? Taking?    Cholecalciferol (Vitamin D3) 125 MCG (5000 UT) CAPS  Self Yes No   Sig: Take by mouth in the morning   Diclofenac Sodium (VOLTAREN) 1 %   No No   Sig: Apply 2 g topically 3 (three) times a day as needed (pain) For pain to affected area   Diclofenac Sodium (VOLTAREN) 1 % 9/2/2023  No Yes   Sig: Apply 2 g topically 4 (four) times a day   Fesoterodine Fumarate ER (Toviaz) 4 MG TB24 9/1/2023 Self Yes Yes   Sig: Take 1 tablet by mouth daily   Lurasidone HCl 60 MG TABS 9/1/2023  No Yes   Sig: Take 1 tablet (60 mg total) by mouth 2 (two) times a day TAKEN IN THE MORNING AND AT NIGHT-----Latuda   RABEprazole (ACIPHEX) 20 MG tablet 2023 Self Yes Yes   Sig: Take 20 mg by mouth 2 (two) times a day   albuterol (Ventolin HFA) 90 mcg/act inhaler 2023 Self No Yes   Sig: Inhale 2 puffs every 6 (six) hours as needed for wheezing   amLODIPine (NORVASC) 5 mg tablet 2023  No Yes   Sig: TAKE 1 TABLET (5 MG TOTAL) BY MOUTH DAILY   ammonium lactate (LAC-HYDRIN) 12 % cream   Yes No   buPROPion (WELLBUTRIN XL) 150 mg 24 hr tablet 2023 Self No Yes   Sig: Take 1 tablet (150 mg total) by mouth daily   buPROPion (WELLBUTRIN XL) 300 mg 24 hr tablet 2023 Self Yes Yes   Sig: Take 300 mg by mouth daily   carBAMazepine (CARBATROL) 300 MG 12 hr capsule  Self Yes No   Sig: Take 1 capsule by mouth in the morning   carboxymethylcellulose (REFRESH PLUS) 0.5 % SOLN   Yes No   Patient not taking: Reported on 8/10/2023   cephalexin (KEFLEX) 500 mg capsule 2023  Yes Yes   Sig: Take 500 mg by mouth 4 (four) times a day   clotrimazole-betamethasone (LOTRISONE) 1-0.05 % cream   No No   Sig: Apply topically 2 (two) times a day   Patient not taking: Reported on 8/10/2023   colestipol (COLESTID) 1 g tablet 2023 Self No Yes   Sig: Take 1 tablet (1 g total) by mouth 2 (two) times a day   cyclobenzaprine (FLEXERIL) 10 mg tablet Not Taking  No No   Sig: Take 1 tablet (10 mg total) by mouth daily at bedtime   Patient not taking: Reported on 2023   cyproheptadine (PERIACTIN) 4 mg tablet   Yes No   dexamethasone (DECADRON) 1 mg tablet Not Taking Self Yes No   Patient not taking: Reported on 2023   diazepam (VALIUM) 5 mg tablet Not Taking  No No   Sig: Take 1 tablet 1 hour prior to MRI and may take additional tablet 15 minutes prior to MRI as needed for anxiety   Patient not taking: Reported on 2023   divalproex sodium (DEPAKOTE ER) 250 mg 24 hr tablet  Self Yes No   divalproex sodium (DEPAKOTE ER) 500 mg 24 hr tablet  Self Yes No   Si mg 2 (two) times a day   divalproex sodium (DEPAKOTE) 250 mg EC tablet  Self No No   Sig: Take 3 tablets (750 mg total) by mouth every 12 (twelve) hours   ergocalciferol (ERGOCALCIFEROL) 1.25 MG (85710 UT) capsule Unknown  Yes No   estradiol (ESTRACE) 0.5 MG tablet 9/2/2023  No Yes   Sig: Take 1 tablet (0.5 mg total) by mouth daily   fluticasone (FLOVENT HFA) 110 MCG/ACT inhaler Not Taking Self Yes No   Sig: Inhale 2 puffs as needed Rinse mouth after use.    Patient not taking: Reported on 9/2/2023   furosemide (LASIX) 20 mg tablet   No No   Sig: Take 1 tablet (20 mg total) by mouth daily   Patient not taking: Reported on 8/10/2023   furosemide (LASIX) 20 mg tablet 9/2/2023  No Yes   Sig: TAKE 1 TABLET BY MOUTH DAILY   haloperidol decanoate (Haldol Decanoate) 50 mg/mL injection 9/1/2023 Self Yes Yes   Sig: every 30 (thirty) days   hydrOXYzine HCL (ATARAX) 25 mg tablet Not Taking Self No No   Sig: Take 1 tablet (25 mg total) by mouth every 6 (six) hours as needed for itching (mild anxiety)   Patient not taking: Reported on 9/2/2023   hydrocortisone 2.5 % ointment  Self No No   Sig: Apply topically 2 (two) times a day   hydrocortisone 2.5 % ointment   No No   Sig: Apply topically 2 (two) times a day   Patient not taking: Reported on 8/18/2023   levothyroxine (Euthyrox) 125 mcg tablet 9/1/2023 Self No Yes   Sig: Take 1 tablet (125 mcg total) by mouth daily in the early morning   losartan (COZAAR) 50 mg tablet 9/1/2023  No Yes   Sig: TAKE ONE TABLET BY MOUTH DAILY   memantine (NAMENDA) 10 mg tablet Unknown  Yes No   methocarbamol (ROBAXIN) 500 mg tablet Not Taking  No No   Sig: Take 1 tablet (500 mg total) by mouth 3 (three) times a day as needed for muscle spasms   Patient not taking: Reported on 7/11/2023   metoprolol tartrate (LOPRESSOR) 25 mg tablet 9/1/2023  No Yes   Sig: TAKE ONE TABLET BY MOUTH EVERY 12 HOURS   mometasone (ELOCON) 0.1 % lotion  Self No No   Sig: Apply topically daily   Patient not taking: Reported on 7/11/2023   nystatin (MYCOSTATIN) powder Not Taking  No No   Sig: Apply topically 2 (two) times a day   Patient not taking: Reported on 9/2/2023   ondansetron (ZOFRAN-ODT) 4 mg disintegrating tablet Past Week  No Yes   Sig: Take 1 tablet (4 mg total) by mouth every 6 (six) hours as needed for nausea or vomiting   phenazopyridine (PYRIDIUM) 100 mg tablet Unknown  No No   Sig: Take 1 tablet (100 mg total) by mouth 3 (three) times a day as needed for bladder spasms   pilocarpine (SALAGEN) 5 mg tablet 9/1/2023 Self Yes Yes   Sig: Take 5 mg by mouth 2 (two) times a day   predniSONE 20 mg tablet Unknown  Yes No   rOPINIRole (REQUIP) 0.5 mg tablet Not Taking Self Yes No   Sig: Take by mouth daily at bedtime   Patient not taking: Reported on 9/2/2023   rOPINIRole (REQUIP) 1 mg tablet 9/1/2023 Self Yes Yes   topiramate (TOPAMAX) 100 mg tablet   No No   Sig: Take 1.5 tablets (150 mg total) by mouth 2 (two) times a day   topiramate (TOPAMAX) 200 MG tablet 9/1/2023 Self Yes Yes   topiramate (TOPAMAX) 50 MG tablet  Self Yes No   Sig: Take 50 mg by mouth 2 (two) times a day   triamcinolone (KENALOG) 0.1 % ointment   Yes No   Patient not taking: Reported on 7/11/2023   trospium chloride (SANCTURA) 20 mg tablet Unknown Self Yes No   Sig: Take 20 mg by mouth 2 (two) times a day   venlafaxine (EFFEXOR) 37.5 mg tablet Unknown Self Yes No   Sig: Take 37.5 mg by mouth daily      Facility-Administered Medications: None       Past Medical History:   Diagnosis Date   • Anxiety    • Arthritis     oa cassandra knees   • Asthma     good control- no medications   • Yan's esophagus    • Bipolar affective disorder (HCC)    • Cannabis abuse with unspecified 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HCC)    • Chronic pain disorder     lumbar   • COPD (chronic obstructive pulmonary disease) ([facility])    • CPAP (continuous positive airway pressure) dependence    • Degenerative disc disease at L5-S1 level    • Deliberate self-cutting     9/15/22per pt has not done recently-- does see a therapist and psychiatrist regularly   • Depression 09/16/2008   • Disease of thyroid gland     hypo   • MARTINEZ (dyspnea on exertion)     per pt "has had this with exertion and not new"   • Drug overdose 10/28/2008    suicide attempt and again drug overdose 7/2022-- [facility] floor and than transferred to [facility]   • Dysphagia    • Dyspnea    • Edema     BLE   • Family history of blood clots    • GERD (gastroesophageal reflux disease)    • Headache(784.0)    • Headache, tension-type    • High blood pressure    • History of COVID-19 12/30/2020    Symptoms started on 12/30/2020 and then got worse. Today she is feeling a little bit better. She is a candidate for monoclonal antibody infusion and set for 1/6/21 @ 1pm at [facility]. 01/07/21 - telemedicine -    • Hyperlipidemia    • Hypertension    • Knee pain, bilateral     Especially right   • Medical marijuana use    • Memory loss    • Migraines    • Obesity    • Overactive bladder    • Sjogren's disease ([address])    • Sleep apnea    • Stress incontinence    • Suicidal ideations    • Teeth missing    • Tremor     per pt Tremors of Right Leg and both Arms   • Visual impairment    • Wears glasses        Past Surgical History:   Procedure Laterality Date   • BREAST CYST EXCISION Right 1989   • CARPAL TUNNEL RELEASE Left    • CHOLECYSTECTOMY  05/2003    Laparoscopic   • COLONOSCOPY      01/12/2009   • DILATION AND CURETTAGE OF UTERUS     • ELBOW SURGERY Left     x2.  No hardware   • ESOPHAGOGASTRODUODENOSCOPY     • FOOT SURGERY Left     Plantar fasciotomy   • HERNIA REPAIR     • HYSTERECTOMY      laporoscopic, partial   • KNEE ARTHROSCOPY Bilateral    • OOPHORECTOMY Left 10/2015   • MI GASTROCNEMIUS RECESSION Left 02/24/2021    Procedure: RECESSION GASTROC OPEN;  Surgeon: Jakub Montoya DPM;  Location: [address] [place] [address] MAIN OR;  Service: Podiatry   • MI RPR UMBILICAL HRNA 5 YRS/> REDUCIBLE N/A 04/24/2019    Procedure: REPAIR HERNIA UMBILICAL LAPAROSCOPIC W/ ROBOTICS;  Surgeon: Rip Gonzalez MD;  Location: [facility] Main OR;  Service: General   • HI SPHINCTEROTOMY ANAL DIVISION SPHINCTER SPX N/A 2018    Procedure: EUA, LEFT PARTIAL INTERNAL SPHINCTEROTOMY;  Surgeon: Jasmyn Shaffer MD;  Location: [address] [place] [address] MAIN OR;  Service: Colorectal   • HI TNOT ELBOW LATERAL/MEDIAL DEBRIDE OPEN TDN RPR Left 2022    Procedure: RELEASE EPICONDYLAR ELBOW MEDIAL;  Surgeon: Vicky Oliver MD;  Location: [facility] MAIN OR;  Service: Orthopedics   • REDUCTION MAMMAPLASTY Bilateral    • REPAIR LACERATION Left     left hand-2009   • REPLACEMENT TOTAL KNEE Right    • ROTATOR CUFF REPAIR Left    • TONSILECTOMY AND ADNOIDECTOMY     • US GUIDED MSK PROCEDURE  2021   • VASCULAR SURGERY     • VEIN LIGATION Bilateral    • WISDOM TOOTH EXTRACTION         Family History   Problem Relation Age of Onset   • Kidney cancer Mother    • Diabetes Mother    • Depression Mother    • Stroke Mother    • Arthritis Mother    • Cancer Mother    • Psychiatric Illness Mother    • No Known Problems Father    • No Known Problems Sister    • No Known Problems Maternal Grandmother    • No Known Problems Maternal Grandfather    • No Known Problems Paternal Grandmother    • No Known Problems Paternal Grandfather    • Colon cancer Maternal Uncle    • Colon cancer Maternal Uncle    • Colon cancer Paternal Uncle    • Colon cancer Family    • Alcohol abuse Sister    • Asthma Sister      I have reviewed and agree with the history as documented.     E-Cigarette/Vaping   • E-Cigarette Use Current Some Day User    • Comments medical THC      E-Cigarette/Vaping Substances   • Nicotine No    • THC Yes    • CBD No    • Flavoring No    • Other No    • Unknown No      Social History     Tobacco Use   • Smoking status: Former     Packs/day: 2.00     Years: 30.00     Total pack years: 60.00     Types: Cigarettes     Start date: 5     Quit date: 2018     Years since quittin.6   • Smokeless tobacco: Never   • Tobacco comments:     Started at age 14. Vaping Use   • Vaping Use: Some days   • Substances: THC   Substance Use Topics   • Alcohol use: Not Currently     Comment: Recovering alcoholic   • Drug use: Yes     Types: Marijuana, Methamphetamines     Comment: Medical marijuana        Review of Systems   Constitutional: Positive for fatigue. Negative for chills and fever. HENT: Negative for congestion, rhinorrhea and sneezing. Respiratory: Positive for chest tightness and shortness of breath. Negative for cough. Cardiovascular: Positive for chest pain and leg swelling. Negative for palpitations. B/l leg swelling that patient states is chronic   Gastrointestinal: Negative for abdominal pain, nausea and vomiting. Genitourinary: Negative for dysuria and urgency. Musculoskeletal: Negative for arthralgias and back pain. Skin: Negative for color change and pallor. Neurological: Negative for syncope and light-headedness. Psychiatric/Behavioral: Negative for agitation, behavioral problems and confusion. Physical Exam  ED Triage Vitals   Temperature Pulse Respirations Blood Pressure SpO2   09/01/23 1816 09/01/23 1813 09/01/23 1813 09/01/23 1816 09/01/23 1813   98.7 °F (37.1 °C) 90 17 119/71 95 %      Temp Source Heart Rate Source Patient Position - Orthostatic VS BP Location FiO2 (%)   09/01/23 1816 09/01/23 1813 09/01/23 1813 09/01/23 1813 --   Oral Monitor Lying Right arm       Pain Score       09/01/23 1930       3             Orthostatic Vital Signs  Vitals:    09/03/23 0109 09/03/23 0109 09/03/23 0438 09/03/23 0737   BP: 116/75 116/75 120/70 125/72   Pulse: 68 67 61 61   Patient Position - Orthostatic VS:           Physical Exam  Constitutional:       Appearance: She is well-developed. HENT:      Head: Normocephalic and atraumatic. Mouth/Throat:      Mouth: Mucous membranes are moist.      Pharynx: Oropharynx is clear. Eyes:      Extraocular Movements: Extraocular movements intact. Pupils: Pupils are equal, round, and reactive to light. Cardiovascular:      Rate and Rhythm: Normal rate and regular rhythm. Pulses: Normal pulses. Heart sounds: Normal heart sounds. Pulmonary:      Effort: Pulmonary effort is normal.      Breath sounds: Normal breath sounds. Abdominal:      Palpations: Abdomen is soft. Skin:     General: Skin is warm and dry. Capillary Refill: Capillary refill takes less than 2 seconds. Neurological:      General: No focal deficit present. Mental Status: She is alert and oriented to person, place, and time.    Psychiatric:         Mood and Affect: Mood normal.         ED Medications  Medications   heparin (porcine) 25,000 units in 0.45% NaCl 250 mL infusion (premix) (10 Units/kg/hr × 125 kg (Order-Specific) Intravenous Restarted 9/3/23 4890)   heparin (porcine) injection 10,000 Units (has no administration in time range)   heparin (porcine) injection 5,000 Units (has no administration in time range)   acetaminophen (TYLENOL) tablet 650 mg (has no administration in time range)   amLODIPine (NORVASC) tablet 5 mg (5 mg Oral Given 9/3/23 0836)   carBAMazepine (TEGretol XR) 12 hr tablet 300 mg (300 mg Oral Given 9/3/23 0839)   colestipol (COLESTID) tablet 1 g (1 g Oral Given 9/3/23 0837)   Diclofenac Sodium (VOLTAREN) 1 % topical gel 2 g (2 g Topical Not Given 9/3/23 0839)   furosemide (LASIX) tablet 20 mg (20 mg Oral Given 9/3/23 0836)   levothyroxine tablet 125 mcg (125 mcg Oral Given 9/3/23 0529)   lurasidone (LATUDA) tablet 60 mg (60 mg Oral Given 9/3/23 0839)   pantoprazole (PROTONIX) EC tablet 40 mg (40 mg Oral Given 9/3/23 0836)   oxybutynin (DITROPAN) tablet 5 mg (5 mg Oral Given 9/3/23 0836)   venlafaxine (EFFEXOR) tablet 37.5 mg (37.5 mg Oral Given 9/3/23 0836)   albuterol (PROVENTIL HFA,VENTOLIN HFA) inhaler 2 puff (has no administration in time range)   ondansetron (ZOFRAN-ODT) dispersible tablet 4 mg (4 mg Oral Given 9/2/23 1121)   losartan (COZAAR) tablet 50 mg (50 mg Oral Given 9/3/23 0836)   memantine (NAMENDA) tablet 10 mg (10 mg Oral Given 9/3/23 0836)   metoprolol tartrate (LOPRESSOR) tablet 25 mg (25 mg Oral Given 9/3/23 0109)   rOPINIRole (REQUIP) tablet 1 mg (1 mg Oral Given 9/2/23 2125)   fluticasone (ARNUITY ELLIPTA) 100 MCG/ACT inhaler 1 puff (1 puff Inhalation Not Given 9/3/23 0840)   buPROPion (WELLBUTRIN XL) 24 hr tablet 450 mg (450 mg Oral Given 9/3/23 0836)   divalproex sodium (DEPAKOTE) DR tablet 750 mg (750 mg Oral Given 9/3/23 0839)   topiramate (TOPAMAX) tablet 150 mg (150 mg Oral Given 9/3/23 0835)   cephalexin (KEFLEX) capsule 500 mg (500 mg Oral Given 9/3/23 0529)   LORazepam (ATIVAN) injection 0.5 mg (has no administration in time range)   iohexol (OMNIPAQUE) 350 MG/ML injection (MULTI-DOSE) 75 mL (75 mL Intravenous Given 9/1/23 2217)   heparin (porcine) injection 10,000 Units (10,000 Units Intravenous Given 9/1/23 2239)   potassium chloride (K-DUR,KLOR-CON) CR tablet 40 mEq (40 mEq Oral Given 9/2/23 0831)   perflutren lipid microsphere (DEFINITY) injection (0.4 mL/min Intravenous Given 9/2/23 1548)       Diagnostic Studies  Results Reviewed     Procedure Component Value Units Date/Time    CBC and Platelet [[id number]]  (Abnormal) Collected: 09/02/23 0517    Lab Status: Final result Specimen: Blood from Arm, Left Updated: 09/02/23 0550     WBC 9.92 Thousand/uL      RBC 4.59 Million/uL      Hemoglobin 14.1 g/dL      Hematocrit 43.6 %      MCV 95 fL      MCH 30.7 pg      MCHC 32.3 g/dL      RDW 13.3 %      Platelets 137 Thousands/uL      MPV 10.6 fL     Basic metabolic panel [[id number]]  (Abnormal) Collected: 09/02/23 0517    Lab Status: Final result Specimen: Blood from Arm, Left Updated: 09/02/23 0546     Sodium 140 mmol/L      Potassium 3.6 mmol/L      Chloride 107 mmol/L      CO2 22 mmol/L      ANION GAP 11 mmol/L      BUN 11 mg/dL      Creatinine 0.51 mg/dL      Glucose 92 mg/dL      Calcium 8.2 mg/dL      eGFR 111 ml/min/1.73sq m     Narrative:      [facility] guidelines for Chronic Kidney Disease (CKD):   •  Stage 1 with normal or high GFR (GFR > 90 mL/min/1.73 square meters)  •  Stage 2 Mild CKD (GFR = 60-89 mL/min/1.73 square meters)  •  Stage 3A Moderate CKD (GFR = 45-59 mL/min/1.73 square meters)  •  Stage 3B Moderate CKD (GFR = 30-44 mL/min/1.73 square meters)  •  Stage 4 Severe CKD (GFR = 15-29 mL/min/1.73 square meters)  •  Stage 5 End Stage CKD (GFR <15 mL/min/1.73 square meters)  Note: GFR calculation is accurate only with a steady state creatinine    HS Troponin I 4hr [[id number]]  (Abnormal) Collected: 09/02/23 0250    Lab Status: Final result Specimen: Blood from Arm, Left Updated: 09/02/23 0324     hs TnI 4hr 113 ng/L      Delta 4hr hsTnI 47 ng/L     HS Troponin I 2hr [[id number]]  (Abnormal) Collected: 09/01/23 2320    Lab Status: Final result Specimen: Blood from Arm, Right Updated: 09/02/23 0006     hs TnI 2hr 107 ng/L      Delta 2hr hsTnI 41 ng/L     APTT [[id number]]  (Normal) Collected: 09/01/23 2100    Lab Status: Final result Specimen: Blood from Arm, Left Updated: 09/01/23 2236     PTT 30 seconds     Protime-INR [[id number]]  (Normal) Collected: 09/01/23 2100    Lab Status: Final result Specimen: Blood from Arm, Left Updated: 09/01/23 2236     Protime 14.1 seconds      INR 1.02    D-dimer, quantitative [[id number]]  (Abnormal) Collected: 09/01/23 2100    Lab Status: Final result Specimen: Blood from Arm, Left Updated: 09/01/23 2148     D-Dimer, Quant >20.00 ug/ml FEU     Narrative: In the evaluation for possible pulmonary embolism, in the appropriate (Well's Score of 4 or less) patient, the age adjusted d-dimer cutoff for this patient can be calculated as:    Age x 0.01 (in ug/mL) for Age-adjusted D-dimer exclusion threshold for a patient over 50 years.     HS Troponin 0hr (reflex protocol) [[id number]]  (Abnormal) Collected: 09/01/23 2100    Lab Status: Final result Specimen: Blood from Arm, Left Updated: 09/01/23 2142     hs TnI 0hr 66 ng/L     B-Type Natriuretic Peptide(BNP) [[id number]]  (Normal) Collected: 09/01/23 2100    Lab Status: Final result Specimen: Blood from Arm, Left Updated: 09/01/23 2141     BNP 37 pg/mL     Comprehensive metabolic panel [[id number]] Collected: 09/01/23 2100    Lab Status: Final result Specimen: Blood from Arm, Left Updated: 09/01/23 2133     Sodium 141 mmol/L      Potassium 3.9 mmol/L      Chloride 108 mmol/L      CO2 22 mmol/L      ANION GAP 11 mmol/L      BUN 14 mg/dL      Creatinine 0.64 mg/dL      Glucose 81 mg/dL      Calcium 8.8 mg/dL      AST 15 U/L      ALT 14 U/L      Alkaline Phosphatase 52 U/L      Total Protein 7.0 g/dL      Albumin 4.0 g/dL      Total Bilirubin 0.36 mg/dL      eGFR 103 ml/min/1.73sq m     Narrative:      [facility] guidelines for Chronic Kidney Disease (CKD):   •  Stage 1 with normal or high GFR (GFR > 90 mL/min/1.73 square meters)  •  Stage 2 Mild CKD (GFR = 60-89 mL/min/1.73 square meters)  •  Stage 3A Moderate CKD (GFR = 45-59 mL/min/1.73 square meters)  •  Stage 3B Moderate CKD (GFR = 30-44 mL/min/1.73 square meters)  •  Stage 4 Severe CKD (GFR = 15-29 mL/min/1.73 square meters)  •  Stage 5 End Stage CKD (GFR <15 mL/min/1.73 square meters)  Note: GFR calculation is accurate only with a steady state creatinine    CBC and differential [[id number]] Collected: 09/01/23 2100    Lab Status: Final result Specimen: Blood from Arm, Left Updated: 09/01/23 2123     WBC 9.86 Thousand/uL      RBC 4.82 Million/uL      Hemoglobin 14.5 g/dL      Hematocrit 45.4 %      MCV 94 fL      MCH 30.1 pg      MCHC 31.9 g/dL      RDW 13.2 %      MPV 10.6 fL      Platelets 071 Thousands/uL      nRBC 0 /100 WBCs      Neutrophils Relative 57 %      Immat GRANS % 1 %      Lymphocytes Relative 28 %      Monocytes Relative 12 %      Eosinophils Relative 1 %      Basophils Relative 1 %      Neutrophils Absolute 5.75 Thousands/µL Immature Grans Absolute 0.07 Thousand/uL      Lymphocytes Absolute 2.72 Thousands/µL      Monocytes Absolute 1.15 Thousand/µL      Eosinophils Absolute 0.12 Thousand/µL      Basophils Absolute 0.05 Thousands/µL                  VAS lower limb venous duplex study, complete bilateral   Final Result by Shay Cosme MD (09/02 4581)      CTA ED chest PE study   Final Result by Oj Harris DO (09/01 6299)      Large saddle embolus is seen which extends into the right and left lower lobar and several bilateral lower lobe segmental pulmonary arterial branches. There is associated evidence of right heart strain. Subtle lobulated contour of the liver may suggest a component of hepatic cirrhosis. Other findings as above. I personally discussed this study with Dr. Steve Trinidad in the [facility] ED on 9/1/2023 11:28 PM.            Workstation performed: [id number]         XR chest 1 view portable   Final Result by Herve Bryant MD (09/02 8549)      No acute cardiopulmonary disease.       Findings are stable            Workstation performed: [id number]               Procedures  ECG 12 Lead Documentation Only    Date/Time: 9/3/2023 9:56 AM    Performed by: Gerry Martinez MD  Authorized by: Gerry Martinez MD    Indications / Diagnosis:  Chest Pain  ECG reviewed by me, the ED Provider: yes    Patient location:  ED  Interpretation:     Interpretation: non-specific    Rate:     ECG rate:  81    ECG rate assessment: normal    Rhythm:     Rhythm: sinus rhythm    Ectopy:     Ectopy: none    QRS:     QRS axis:  Normal    QRS intervals:  Normal  Conduction:     Conduction: normal    T waves:     T waves: non-specific            ED Course     -Patient left department without being seen initially, then came back and was evaluated by me  -Initial troponin 66, d dimer>20, CT PE study ordered  -SpO2 hovering in low 90s, 2L NC initiated with good response  -Initial CT read by ED provider suggestive of large PE, heparin initiated  -Patient continues to look well, satting well on 2 L NC, remains with minimal chest discomfort, no other symptoms  -Official radiology read for CT PE study came back suggestive of large saddle emoblus PE with right heart strain  -Conversation had with critical care attending Dr. Campos Roy regarding whether the patient needs thrombolytics or other intervention and whether PERT team response needs to be initiated. Dr. Campos Roy stated that even though she has right heart strain and elevated troponin to hold off on PERT initiation, lytics, or other intervention as she is well appearing and without signs of hemodynamic instability or impending deterioration. Stated that we should admit to medicine for now. -Patient admitted to medicine                      SBIRT 20yo+    Flowsheet Row Most Recent Value   Initial Alcohol Screen: US AUDIT-C     1. How often do you have a drink containing alcohol? 0 Filed at: 09/01/2023 1815   2. How many drinks containing alcohol do you have on a typical day you are drinking? 0 Filed at: 09/01/2023 1815   3b. FEMALE Any Age, or MALE 65+: How often do you have 4 or more drinks on one occassion? 0 Filed at: 09/01/2023 1815   Audit-C Score 0 Filed at: 09/01/2023 1815   OLEG: How many times in the past year have you. .. Used an illegal drug or used a prescription medication for non-medical reasons? Daily or Almost Daily Filed at: 09/01/2023 1815   DAST-10: In the past 12 months. ..    1. Have you used drugs other than those required for medical reasons? 1 Filed at: 09/01/2023 1815   2. Do you use more than one drug at a time? 1 Filed at: 09/01/2023 1815   3. Have you had medical problems as a result of your drug use (e.g., memory loss, hepatitis, convulsions, bleeding, etc.)? 0 Filed at: 09/01/2023 1815   4. Have you had "blackouts" or "flashbacks" as a result of drug use? YesNo 0 Filed at: 09/01/2023 1815   5. Do you ever feel bad or guilty about your drug use?  1 Filed at: 09/01/2023 1815   6. Does your spouse (or parent) ever complain about your involvement with drugs? 0 Filed at: 09/01/2023 1815   7. Have you neglected your family because of your use of drugs? 0 Filed at: 09/01/2023 1815   8. Have you engaged in illegal activities in order to obtain drugs? 0 Filed at: 09/01/2023 1815   9. Have you ever experienced withdrawal symptoms (felt sick) when you stopped taking drugs? 0 Filed at: 09/01/2023 1815   10. Are you always able to stop using drugs when you want to? 1 Filed at: 09/01/2023 1815   DAST-10 Score 4 Filed at: 09/01/2023 1815                Medical Decision Making  Patient came to ED with SOB and vague chest discomfort, d dimer and troponin were elevated with normal ecg and CT PE study was done revealing large saddle embolus PE with right heart strain. Before radiology read came back heparin was initiated based off ED provider interpretation. Patient remained hemodynamically stable and without signs of impending deterioration throughout the ED stay, on 2L O2 NC. Conversation had with critical care attending Dr. Doris Davila regarding whether the patient needs thrombolytics or other intervention and whether PERT team response needs to be initiated. Dr. Doris Davila stated that even though she has right heart strain and elevated troponin to hold off on PERT initiation, lytics, or other intervention as she is well appearing and without signs of hemodynamic instability or impending deterioration. Stated that we should admit to medicine for now. Patient admitted to medicine with telemetry. Amount and/or Complexity of Data Reviewed  Labs: ordered. Radiology: ordered. Risk  Prescription drug management. Decision regarding hospitalization.             Disposition  Final diagnoses:   Pulmonary embolism ([address])     Time reflects when diagnosis was documented in both MDM as applicable and the Disposition within this note     Time User Action Codes Description Comment    9/2/2023 12:05 AM Myra Denise Add [I26.99] Pulmonary embolism ([address])     9/2/2023  1:01 AM Hall Zachjenny Mosesa Add Clint. [id number]] Intentional self-harm by unspecified sharp object, sequela ([address])     9/2/2023  1:01 AM Rosario Rojas [F19.10] Substance abuse ([address] [facility])     9/2/2023  1:01 AM [address] [place] [address] [F25.0] Schizoaffective disorder, bipolar type ([address] [facility])     9/2/2023  4:28 AM [address] [place] [address] [I26.92] Acute saddle pulmonary embolism without acute cor pulmonale [facility])       ED Disposition     ED Disposition   Admit    Condition   Stable    Date/Time   Sat Sep 2, 2023 12:05 AM    Comment   Case was discussed with ERIKA and the patient's admission status was agreed to be Admission Status: inpatient status to the service of Dr. Halina Briggs . Follow-up Information    None         Current Discharge Medication List      START taking these medications    Details   apixaban (ELIQUIS) 5 mg Take 2 tablets (10 mg total) by mouth 2 (two) times a day for 7 days, THEN 1 tablet (5 mg total) 2 (two) times a day for 23 days. Qty: 74 tablet, Refills: 0    Comments: Eliquis Starter Pack  Associated Diagnoses: Acute saddle pulmonary embolism without acute cor pulmonale (HCC)         CONTINUE these medications which have NOT CHANGED    Details   albuterol (Ventolin HFA) 90 mcg/act inhaler Inhale 2 puffs every 6 (six) hours as needed for wheezing  Qty: 18 g, Refills: 5    Comments: Substitution to a formulary equivalent within the same pharmaceutical class is authorized.   Associated Diagnoses: Chronic obstructive pulmonary disease, unspecified COPD type (HCC)      amLODIPine (NORVASC) 5 mg tablet TAKE 1 TABLET (5 MG TOTAL) BY MOUTH DAILY  Qty: 90 tablet, Refills: 3    Associated Diagnoses: Hypertension, unspecified type      buPROPion (WELLBUTRIN XL) 300 mg 24 hr tablet Take 300 mg by mouth daily      cephalexin (KEFLEX) 500 mg capsule Take 500 mg by mouth 4 (four) times a day      colestipol (COLESTID) 1 g tablet Take 1 tablet (1 g total) by mouth 2 (two) times a day  Qty: 60 tablet, Refills: 0    Associated Diagnoses: Mixed hyperlipidemia      !!  Diclofenac Sodium (VOLTAREN) 1 % Apply 2 g topically 4 (four) times a day  Qty: 150 g, Refills: 0    Associated Diagnoses: Medial epicondylitis of right elbow      estradiol (ESTRACE) 0.5 MG tablet Take 1 tablet (0.5 mg total) by mouth daily  Qty: 30 tablet, Refills: 4    Associated Diagnoses: Vasomotor symptoms due to menopause      Fesoterodine Fumarate ER (Toviaz) 4 MG TB24 Take 1 tablet by mouth daily      !! furosemide (LASIX) 20 mg tablet TAKE 1 TABLET BY MOUTH DAILY  Qty: 30 tablet, Refills: 1    Associated Diagnoses: Hypertension, unspecified type      haloperidol decanoate (Haldol Decanoate) 50 mg/mL injection every 30 (thirty) days      levothyroxine (Euthyrox) 125 mcg tablet Take 1 tablet (125 mcg total) by mouth daily in the early morning  Qty: 90 tablet, Refills: 3    Associated Diagnoses: Acquired hypothyroidism      losartan (COZAAR) 50 mg tablet TAKE ONE TABLET BY MOUTH DAILY  Qty: 30 tablet, Refills: 9    Associated Diagnoses: Hypertension, unspecified type      Lurasidone HCl 60 MG TABS Take 1 tablet (60 mg total) by mouth 2 (two) times a day TAKEN IN THE MORNING AND AT NIGHT-----Latuda  Qty: 60 tablet, Refills: 0    Associated Diagnoses: Borderline personality disorder (HCC)      metoprolol tartrate (LOPRESSOR) 25 mg tablet TAKE ONE TABLET BY MOUTH EVERY 12 HOURS  Qty: 60 tablet, Refills: 2    Associated Diagnoses: Hypertension, unspecified type      ondansetron (ZOFRAN-ODT) 4 mg disintegrating tablet Take 1 tablet (4 mg total) by mouth every 6 (six) hours as needed for nausea or vomiting  Qty: 20 tablet, Refills: 0    Associated Diagnoses: Nausea      pilocarpine (SALAGEN) 5 mg tablet Take 5 mg by mouth 2 (two) times a day      RABEprazole (ACIPHEX) 20 MG tablet Take 20 mg by mouth 2 (two) times a day      !! rOPINIRole (REQUIP) 1 mg tablet !! topiramate (TOPAMAX) 200 MG tablet       ammonium lactate (LAC-HYDRIN) 12 % cream       carBAMazepine (CARBATROL) 300 MG 12 hr capsule Take 1 capsule by mouth in the morning      carboxymethylcellulose (REFRESH PLUS) 0.5 % SOLN       Cholecalciferol (Vitamin D3) 125 MCG (5000 UT) CAPS Take by mouth in the morning      clotrimazole-betamethasone (LOTRISONE) 1-0.05 % cream Apply topically 2 (two) times a day  Qty: 30 g, Refills: 0    Associated Diagnoses: Subacute vulvitis      cyclobenzaprine (FLEXERIL) 10 mg tablet Take 1 tablet (10 mg total) by mouth daily at bedtime  Qty: 90 tablet, Refills: 0    Associated Diagnoses: Cervicalgia      cyproheptadine (PERIACTIN) 4 mg tablet       dexamethasone (DECADRON) 1 mg tablet       diazepam (VALIUM) 5 mg tablet Take 1 tablet 1 hour prior to MRI and may take additional tablet 15 minutes prior to MRI as needed for anxiety  Qty: 2 tablet, Refills: 0    Associated Diagnoses: Anxiety      !! Diclofenac Sodium (VOLTAREN) 1 % Apply 2 g topically 3 (three) times a day as needed (pain) For pain to affected area  Qty: 100 g, Refills: 0    Associated Diagnoses: Right knee pain      !! divalproex sodium (DEPAKOTE ER) 250 mg 24 hr tablet       !! divalproex sodium (DEPAKOTE ER) 500 mg 24 hr tablet 500 mg 2 (two) times a day      divalproex sodium (DEPAKOTE) 250 mg EC tablet Take 3 tablets (750 mg total) by mouth every 12 (twelve) hours  Qty: 180 tablet, Refills: 0    Associated Diagnoses: Mood disorder (HCC)      ergocalciferol (ERGOCALCIFEROL) 1.25 MG (00736 UT) capsule       fluticasone (FLOVENT HFA) 110 MCG/ACT inhaler Inhale 2 puffs as needed Rinse mouth after use.       !! furosemide (LASIX) 20 mg tablet Take 1 tablet (20 mg total) by mouth daily  Qty: 5 tablet, Refills: 0    Associated Diagnoses: Bilateral leg edema      !! hydrocortisone 2.5 % ointment Apply topically 2 (two) times a day  Qty: 30 g, Refills: 0    Associated Diagnoses: Contact dermatitis, unspecified contact dermatitis type, unspecified trigger      !! hydrocortisone 2.5 % ointment Apply topically 2 (two) times a day  Qty: 30 g, Refills: 0    Associated Diagnoses: Contact dermatitis, unspecified contact dermatitis type, unspecified trigger      hydrOXYzine HCL (ATARAX) 25 mg tablet Take 1 tablet (25 mg total) by mouth every 6 (six) hours as needed for itching (mild anxiety)  Qty: 60 tablet, Refills: 0    Associated Diagnoses: Anxiety      memantine (NAMENDA) 10 mg tablet       methocarbamol (ROBAXIN) 500 mg tablet Take 1 tablet (500 mg total) by mouth 3 (three) times a day as needed for muscle spasms  Qty: 45 tablet, Refills: 2    Associated Diagnoses: Neck pain      mometasone (ELOCON) 0.1 % lotion Apply topically daily  Qty: 60 mL, Refills: 0    Associated Diagnoses: Chronic eczematous otitis externa of both ears      nystatin (MYCOSTATIN) powder Apply topically 2 (two) times a day  Qty: 60 g, Refills: 0    Associated Diagnoses: Candidiasis of skin      phenazopyridine (PYRIDIUM) 100 mg tablet Take 1 tablet (100 mg total) by mouth 3 (three) times a day as needed for bladder spasms  Qty: 10 tablet, Refills: 0    Associated Diagnoses: Dysuria      predniSONE 20 mg tablet       !! rOPINIRole (REQUIP) 0.5 mg tablet Take by mouth daily at bedtime      !! topiramate (TOPAMAX) 50 MG tablet Take 50 mg by mouth 2 (two) times a day      triamcinolone (KENALOG) 0.1 % ointment       trospium chloride (SANCTURA) 20 mg tablet Take 20 mg by mouth 2 (two) times a day      venlafaxine (EFFEXOR) 37.5 mg tablet Take 37.5 mg by mouth daily       ! ! - Potential duplicate medications found. Please discuss with provider. No discharge procedures on file. PDMP Review       Value Time User    PDMP Reviewed  Yes 8/30/2023  4:36 PM Bailey Tian, DO           ED Provider  Attending physically available and evaluated Zulma Tate. I managed the patient along with the ED Attending.     Electronically Signed by         Randall Samayoa Padma Hill MD  09/03/23 1049

## 2023-09-02 NOTE — ASSESSMENT & PLAN NOTE
· History hypertension  Blood Pressure: 127/78  · Acceptable    · Plan  · Continue home Norvasc 5 mg once a day,   · continue home Lasix 20 mg once a day,   · continue home losartan 50 mg once a day,   · continue home Lopressor 25 mg twice daily

## 2023-09-02 NOTE — ASSESSMENT & PLAN NOTE
· POA started dyspnea, operatory chest tightness worsening upon ambulation no amilcar chest pain, fevers, chills, cough. · No personal history DVT/PE  · At the ED D-dimer elevated over 20.00  · CT PE significant for "Large saddle embolus is seen which extends into the right and left lower lobar and several bilateral lower lobe segmental pulmonary arterial branches. There is associated evidence of right heart strain."  · At the ED patient started on IV heparin drip and subsequently critical care attending was contacted no indication for thrombectomy patient okay to be admitted to [address] [place] [address]. · Echocardiogram was remarkable for pulmonary hypertension, otherwise unremarkable  · Left lower extremity Doppler showed nonocclusive DVT, most likely the source of saddle PE    Plan  · Continue telemetry  · Continue monitoring respiratory status likely patient has remained off oxygen and not hypoxic on room air.   · Continue IV heparin drip, will switch to Eliquis after price check is done  · Consider further work-up coagulopathy given strong family history of mother side for coagulopathy  · Patient will benefit outpatient hematology for further management evaluation upon discharge suspect patient will need lifetime anticoagulation  · Holding home estradiol given acute PE

## 2023-09-02 NOTE — ASSESSMENT & PLAN NOTE
· History of schizoaffective disorder bipolar    · Plan  · Continue home Depakote 750 mg twice daily  · Continue home carbamazepine 300 mg twice daily  · Continue home Wellbutrin 300 mg and 150 mg tablet  · Continue home lurasidone 60 mg every morning, every afternoon  · Continue home Effexor 37.5 mg once a day  · Rest of plan as above

## 2023-09-02 NOTE — ASSESSMENT & PLAN NOTE
· History hypertension  Blood Pressure: 148/89  · Acceptable    · Plan  · Continue home Norvasc 5 mg once a day,   · continue home Lasix 20 mg once a day,   · continue home losartan 50 mg once a day,   · continue home Lopressor 25 mg twice daily

## 2023-09-02 NOTE — ASSESSMENT & PLAN NOTE
· Not in exacerbation  · Continue home albuterol as needed every 6 hours  · Continue Arnuity Ellipta for home Flovent per pharmacy formulary

## 2023-09-02 NOTE — ASSESSMENT & PLAN NOTE
· POA troponins noted  · Likely type 2 non MI troponin elevation in the setting of acute saddle PE with right heart strain on CT PE versus stimulant drug induce  · Most likely in the setting of the PE    · Plan  · Continue trending troponin home to plateau  · Treat underlying cause

## 2023-09-02 NOTE — ASSESSMENT & PLAN NOTE
· History suicide attempt back in July 2022 with urine history of self mutilating. · Patient currently follows with Behavioral Health on the outpatient setting. · POA expressed inability to reach out to [address] [facility] team today and subsequently admitting self mutilation with utility knife blade to cut her left anterior forearm. · At the ED patient able to reach out to [address] [facility] team.  · Patient denied active suicidal ideation however did mention and admits history of self-mutilation and methamphetamine use due to increased anxiety. · Upon questioning from admitting provider about current CODE STATUS explaining CPR and intubation patient noted significantly anxious, avoiding eye contact however answering Level 1 as her CODE STATUS.     Plan:  · Inpatient Consult behavioral health/psych evaluation appreciate recommendations while inpatient  · Naltrexone 50 mg recommended by behavioral health for self-harming behaviors, but patient deferred to outpatient psych team

## 2023-09-02 NOTE — ASSESSMENT & PLAN NOTE
· History of substance abuse methamphetamine, marijuana admitting using at least 2 lines of methamphetamine on the admission day  · Counseled regarding stopping use of illicit drugs

## 2023-09-02 NOTE — ED NOTES
Pt completed lengthy phone conversation with ACT Team. Pt noted to be calmer with decreased tremors. Pt agreeable to stay for MD evaluation/treatment.        Renee Mujica RN  09/01/23 1147

## 2023-09-02 NOTE — ED NOTES
Pt laying on stretcher with HOB elevated. RA O2 90-91% while conversing with ED Resident. Pt denies CP/SOB at present time.  Pt placed on 2L NC [address], RN  09/01/23 8182

## 2023-09-02 NOTE — ASSESSMENT & PLAN NOTE
· POA noted saturating 90% on room air which was placed on 2 L nasal cannula  · Likely in the setting of acute PE with associated history of MAG versus obesity hypoventilation syndrome  · Continue oxygen as her mentation keep oxygen>88%  · BiPAP at bedtime

## 2023-09-02 NOTE — ASSESSMENT & PLAN NOTE
· History suicide attempt back in July 2022 with urine history of self mutilating. · Patient currently follows with Behavioral Health on the outpatient setting. · POA expressed inability to reach out to [address] [facility] team today and subsequently admitting self mutilation with utility knife blade to cut her left anterior forearm. · At the ED patient able to reach out to [address] [facility] team.  · Upon my evaluation patient noted anxious,denied active suicidal ideation however did mention and admits history of self-mutilation and methamphetamine use due to increased anxiety. Upon questioning about current CODE STATUS explaining CPR and intubation patient noted significantly anxious, avoiding eye contact however answering Level 1 as her CODE STATUS. · Plan:  · Start 1:1 given hx suicide attempt and intentional self-mutilation.   · Inpatient Consult behavioral health/psych evaluation appreciate recommendations while inpatient  · Concern during conversation about CODE STATUS unclear intrusive thoughts may have taken placed in terms of SI.

## 2023-09-02 NOTE — ASSESSMENT & PLAN NOTE
· POA troponin noted  awaiting 4-hour collection  · Likely type 2 non MI troponin elevation in the setting of acute saddle PE with right heart strain on CT PE versus stimulant drug induce    · Plan  · Continue trending troponin home to plateau  · Check echocardiogram  · Continue IV heparin for PE

## 2023-09-02 NOTE — ASSESSMENT & PLAN NOTE
· POA started dyspnea, operatory chest tightness worsening upon ambulation no amilcar chest pain, fevers, chills, cough. · No personal history DVT/PE  · At the ED D-dimer elevated over 20.00  · CT PE significant for "Large saddle embolus is seen which extends into the right and left lower lobar and several bilateral lower lobe segmental pulmonary arterial branches. There is associated evidence of right heart strain."  · At the ED patient started on IV heparin drip and subsequently critical care attending was contacted no indication for thrombectomy patient okay to be admitted to [address] [place] [address]. · Plan  · Continue telemetry  · Continue monitoring respiratory status likely patient has remained off oxygen and not hypoxic on room air.   · Continue IV heparin drip consider switching to Lovenox 1 mg/kg versus DOACs in the next 24 hours  · Check echocardiogram  · Check vas duplex ultrasound  · Consider further work-up coagulopathy given strong family history of mother side for coagulopathy  · Patient will benefit outpatient hematology for further management evaluation upon discharge suspect patient will need lifetime anticoagulation  · Holding home estradiol given acute PE

## 2023-09-02 NOTE — ASSESSMENT & PLAN NOTE
· Continue home albuterol as needed every 6 hours  · Continue Reeda Lighter for home Flovent per pharmacy formulary

## 2023-09-03 VITALS
SYSTOLIC BLOOD PRESSURE: 125 MMHG | DIASTOLIC BLOOD PRESSURE: 72 MMHG | WEIGHT: 293 LBS | TEMPERATURE: 98.5 F | RESPIRATION RATE: 17 BRPM | OXYGEN SATURATION: 93 % | HEART RATE: 61 BPM | HEIGHT: 66 IN | BODY MASS INDEX: 47.09 KG/M2

## 2023-09-03 LAB — APTT PPP: 92 SECONDS (ref 23–37)

## 2023-09-03 PROCEDURE — 99239 HOSP IP/OBS DSCHRG MGMT >30: CPT | Performed by: INTERNAL MEDICINE

## 2023-09-03 PROCEDURE — 85730 THROMBOPLASTIN TIME PARTIAL: CPT | Performed by: INTERNAL MEDICINE

## 2023-09-03 RX ORDER — LORAZEPAM 2 MG/ML
0.5 INJECTION INTRAMUSCULAR ONCE
Status: COMPLETED | OUTPATIENT
Start: 2023-09-03 | End: 2023-09-03

## 2023-09-03 RX ADMIN — LURASIDONE HYDROCHLORIDE 60 MG: 20 TABLET, COATED ORAL at 08:39

## 2023-09-03 RX ADMIN — PANTOPRAZOLE SODIUM 40 MG: 40 TABLET, DELAYED RELEASE ORAL at 08:36

## 2023-09-03 RX ADMIN — CEPHALEXIN 500 MG: 500 CAPSULE ORAL at 05:29

## 2023-09-03 RX ADMIN — METOPROLOL TARTRATE 25 MG: 25 TABLET, FILM COATED ORAL at 01:09

## 2023-09-03 RX ADMIN — DIVALPROEX SODIUM 750 MG: 250 TABLET, DELAYED RELEASE ORAL at 08:39

## 2023-09-03 RX ADMIN — FUROSEMIDE 20 MG: 20 TABLET ORAL at 08:36

## 2023-09-03 RX ADMIN — VENLAFAXINE 37.5 MG: 37.5 TABLET ORAL at 08:36

## 2023-09-03 RX ADMIN — CARBAMAZEPINE 300 MG: 100 TABLET, EXTENDED RELEASE ORAL at 08:39

## 2023-09-03 RX ADMIN — LEVOTHYROXINE SODIUM 125 MCG: 125 TABLET ORAL at 05:29

## 2023-09-03 RX ADMIN — CEPHALEXIN 500 MG: 500 CAPSULE ORAL at 01:07

## 2023-09-03 RX ADMIN — TOPIRAMATE 150 MG: 100 TABLET, FILM COATED ORAL at 08:35

## 2023-09-03 RX ADMIN — LORAZEPAM 0.5 MG: 2 INJECTION INTRAMUSCULAR; INTRAVENOUS at 10:49

## 2023-09-03 RX ADMIN — COLESTIPOL HYDROCHLORIDE 1 G: 1 TABLET, FILM COATED ORAL at 08:37

## 2023-09-03 RX ADMIN — HEPARIN SODIUM 12 UNITS/KG/HR: 10000 INJECTION, SOLUTION INTRAVENOUS at 02:22

## 2023-09-03 RX ADMIN — BUPROPION HYDROCHLORIDE 450 MG: 150 TABLET, FILM COATED, EXTENDED RELEASE ORAL at 08:36

## 2023-09-03 RX ADMIN — MEMANTINE 10 MG: 10 TABLET ORAL at 08:36

## 2023-09-03 RX ADMIN — LOSARTAN POTASSIUM 50 MG: 50 TABLET, FILM COATED ORAL at 08:36

## 2023-09-03 RX ADMIN — OXYBUTYNIN CHLORIDE 5 MG: 5 TABLET ORAL at 08:36

## 2023-09-03 RX ADMIN — AMLODIPINE BESYLATE 5 MG: 5 TABLET ORAL at 08:36

## 2023-09-03 NOTE — ASSESSMENT & PLAN NOTE
· History hypertension  Blood Pressure: 125/72  · Acceptable    · Plan  · Continue home Norvasc 5 mg once a day,   · continue home Lasix 20 mg once a day,   · continue home losartan 50 mg once a day,   · continue home Lopressor 25 mg twice daily

## 2023-09-03 NOTE — ASSESSMENT & PLAN NOTE
· POA troponins noted  · Likely type 2 non MI troponin elevation in the setting of acute saddle PE with right heart strain on CT PE versus stimulant drug induce  · Most likely in the setting of the PE, troponins platoed    · Plan  · Treat underlying cause

## 2023-09-03 NOTE — PLAN OF CARE
Problem: MOBILITY - ADULT  Goal: Maintain or return to baseline ADL function  Description: INTERVENTIONS:  -  Assess patient's ability to carry out ADLs; assess patient's baseline for ADL function and identify physical deficits which impact ability to perform ADLs (bathing, care of mouth/teeth, toileting, grooming, dressing, etc.)  - Assess/evaluate cause of self-care deficits   - Assess range of motion  - Assess patient's mobility; develop plan if impaired  - Assess patient's need for assistive devices and provide as appropriate  - Encourage maximum independence but intervene and supervise when necessary  - Involve family in performance of ADLs  - Assess for home care needs following discharge   - Consider OT consult to assist with ADL evaluation and planning for discharge  - Provide patient education as appropriate  Outcome: Progressing  Goal: Maintains/Returns to pre admission functional level  Description: INTERVENTIONS:  - Perform BMAT or MOVE assessment daily.   - Set and communicate daily mobility goal to care team and patient/family/caregiver. - Collaborate with rehabilitation services on mobility goals if consulted  - Perform Range of Motion  times a day. - Reposition patient every  hours.   - Dangle patient  times a day  - Stand patient  times a day  - Ambulate patient times a day  - Out of bed to chair  times a day   - Out of bed for meals  times a day  - Out of bed for toileting  - Record patient progress and toleration of activity level   Outcome: Progressing

## 2023-09-03 NOTE — DISCHARGE SUMMARY
[address] [facility]  Discharge- [facility]  1970, 47 y.o. female MRN: [id number]  Unit/Bed#: S -01 Encounter: [id number]  Primary Care Provider: JHONATAN Fountain   Date and time admitted to hospital: 9/1/2023  6:09 PM    * Acute saddle pulmonary embolism ([address])  Assessment & Plan  · POA started dyspnea, operatory chest tightness worsening upon ambulation no amilcar chest pain, fevers, chills, cough. · No personal history DVT/PE  · At the ED D-dimer elevated over 20.00  · CT PE significant for "Large saddle embolus is seen which extends into the right and left lower lobar and several bilateral lower lobe segmental pulmonary arterial branches. There is associated evidence of right heart strain."  · At the ED patient started on IV heparin drip and subsequently critical care attending was contacted no indication for thrombectomy patient okay to be admitted to [facility].   · Echocardiogram was remarkable for pulmonary hypertension, otherwise unremarkable  · Left lower extremity Doppler showed nonocclusive DVT, most likely the source of saddle PE    Plan  · Start Eliquis  · Patient will benefit outpatient hematology for further management evaluation upon discharge suspect patient will need lifetime anticoagulation  · Holding home estradiol given acute PE    Acute respiratory failure with hypoxia ([address])  Assessment & Plan  · POA noted saturating 90% on room air which was placed on 2 L nasal cannula  · Weaned off to room air  · Likely in the setting of acute PE with associated history of MAG versus obesity hypoventilation syndrome  · BiPAP at bedtime    Elevated troponin  Assessment & Plan  · POA troponins noted  · Likely type 2 non MI troponin elevation in the setting of acute saddle PE with right heart strain on CT PE versus stimulant drug induce  · Most likely in the setting of the PE, troponins platoed    · Plan  · Treat underlying cause    Chronic migraine without aura without status migrainosus, not intractable  Assessment & Plan  · Continue home Topamax 150 mg twice daily    Intentional self-harm by unspecified sharp object, sequela ([address])  Assessment & Plan  · History suicide attempt back in July 2022 with urine history of self mutilating. · Patient currently follows with Behavioral Health on the outpatient setting. · POA expressed inability to reach out to [address] [facility] team today and subsequently admitting self mutilation with utility knife blade to cut her left anterior forearm. · At the ED patient able to reach out to [address] [facility] team.  · Patient denied active suicidal ideation however did mention and admits history of self-mutilation and methamphetamine use due to increased anxiety. · Upon questioning from admitting provider about current CODE STATUS explaining CPR and intubation patient noted significantly anxious, avoiding eye contact however answering Level 1 as her CODE STATUS.     Plan:  · Inpatient Consult behavioral health/psych evaluation appreciate recommendations while inpatient  · Naltrexone 50 mg recommended by behavioral health for self-harming behaviors, but patient deferred to outpatient psych team    Substance abuse [facility])  Assessment & Plan  · History of substance abuse methamphetamine, marijuana admitting using at least 2 lines of methamphetamine on the admission day  · Counseled regarding stopping use of illicit drugs      COPD (chronic obstructive pulmonary disease) ([address])  Assessment & Plan  · Not in exacerbation  · Continue home albuterol as needed every 6 hours  · Continue Chandni Fisherman for home Flovent per pharmacy formulary    MAG (obstructive sleep apnea)  Assessment & Plan  · Continue BiPAP at bedtime    Schizoaffective disorder, bipolar type ([address])  Assessment & Plan  · History of schizoaffective disorder bipolar    · Plan  · Continue home Depakote 750 mg twice daily  · Continue home carbamazepine 300 mg twice daily  · Continue home Wellbutrin 300 mg and 150 mg tablet  · Continue home lurasidone 60 mg every morning, every afternoon  · Continue home Effexor 37.5 mg once a day  · Rest of plan as above    Benign essential hypertension  Assessment & Plan  · History hypertension  Blood Pressure: 125/72  · Acceptable    · Plan  · Continue home Norvasc 5 mg once a day,   · continue home Lasix 20 mg once a day,   · continue home losartan 50 mg once a day,   · continue home Lopressor 25 mg twice daily      Medical Problems     Resolved Problems  Date Reviewed: 9/3/2023   None       Discharging Resident: Valery Dye MD  Discharging Attending: Laure oWods MD  PCP: Angela Fraser, [facility]  Admission Date:   Admission Orders (From admission, onward)     Ordered        09/02/23 1049  Inpatient Admission  Once            09/02/23 1047  Place in Observation  Once            09/02/23 0006  INPATIENT ADMISSION  Once                      Discharge Date: 09/03/23    Consultations During Hospital Stay:  · Behavioral health    Procedures Performed:   · None    Significant Findings / Test Results:   VAS lower limb venous duplex study, complete bilateral   Final Result by Christianne Felty, MD (09/02 1858)      CTA ED chest PE study   Final Result by Cuca Orozco DO (09/01 4921)      Large saddle embolus is seen which extends into the right and left lower lobar and several bilateral lower lobe segmental pulmonary arterial branches. There is associated evidence of right heart strain. Subtle lobulated contour of the liver may suggest a component of hepatic cirrhosis. Other findings as above. I personally discussed this study with Dr. Yoana Boateng in the [facility] ED on 9/1/2023 11:28 PM.            Workstation performed: [id number]         XR chest 1 view portable   Final Result by Edel Robledo MD (09/02 3425)      No acute cardiopulmonary disease.       Findings are stable            Workstation performed: [id number]             · Echo: EF 60%, right ventricular systolic pressure elevated to 60, normal right ventricular function, mild mitral regurgitation and moderate tricuspid regurgitation. · Nonocclusive DVT in the left lower extremity    Incidental Findings:   · None     Test Results Pending at Discharge (will require follow up): · None     Outpatient Tests Requested:  · None    Complications:  None    Reason for Admission: Saddle PE    Hospital Course:   Jonathan Marcus is a 47 y.o. female patient with PMH of schizoaffective disorder/bipolar with suicidal history, substance abuse, COPD, hypertension, MAG, hypothyroidism, hyperlipidemia, obesity who presented after noted dyspnea on exertion that started early afternoon prior to admission with associated chest tightness mostly during inspiration. Patient denies any recent traveling, no recent surgeries however does endorsed sedentary life. Patient endorsed that she has a strong family history of DVT/PE in her grandmother and her mother however is unaware of any actual diagnosis and patient has not been worked up for the same. Patient endorsed chronic lower extremity edema and most recently was noted by her therapist swelling bilaterally however denied any pain or tenderness, and just had some discomfort int he left lower extremity. With further work-up she was found to have saddle PE seen on CTA PE. Patient had acute respiratory failure with hypoxia requiring 2 L of nasal cannula to saturate in the 90s but then was weaned off to room air. Further work-up was done by checking echo that showed right end-systolic pressure 60, EF normal, right ventricular function normal.  Patient was also found to have left lower extremity nonocclusive acute DVT. She was treated with heparin drip and then was transitioned to NOAC getting discharged today, will follow-up with hematology, PCP outpatient. Patient is happy and says that she is ready to go home.         Please see above list of diagnoses and related plan for additional information. Condition at Discharge: good    Discharge Day Visit / Exam:   Subjective:  Patient denies any SOB, and says that she is feeling better compared to before. Vitals: Blood Pressure: 125/72 (09/03/23 0737)  Pulse: 61 (09/03/23 0737)  Temperature: 98.5 °F (36.9 °C) (09/02/23 2217)  Temp Source: Axillary (09/02/23 2217)  Respirations: 17 (09/03/23 0737)  Height: 5' 6" (167.6 cm) (09/02/23 1548)  Weight - Scale: 133 kg (293 lb 10.4 oz) (09/03/23 0536)  SpO2: 93 % (09/03/23 0737)  Exam:   Physical Exam  Vitals and nursing note reviewed. Constitutional:       General: She is not in acute distress. Appearance: She is well-developed. HENT:      Head: Normocephalic and atraumatic. Eyes:      Conjunctiva/sclera: Conjunctivae normal.   Cardiovascular:      Rate and Rhythm: Normal rate and regular rhythm. Heart sounds: No murmur heard. Pulmonary:      Effort: Pulmonary effort is normal. No respiratory distress. Breath sounds: Normal breath sounds. Abdominal:      Palpations: Abdomen is soft. Tenderness: There is no abdominal tenderness. Musculoskeletal:         General: No swelling. Cervical back: Neck supple. Skin:     General: Skin is warm and dry. Capillary Refill: Capillary refill takes less than 2 seconds. Neurological:      Mental Status: She is alert. Psychiatric:         Mood and Affect: Mood normal.          Discussion with Family: Patient declined call to . Discharge instructions/Information to patient and family:   See after visit summary for information provided to patient and family. Provisions for Follow-Up Care:  See after visit summary for information related to follow-up care and any pertinent home health orders. Disposition:   Home    Planned Readmission: None    Discharge Medications:  See after visit summary for reconciled discharge medications provided to patient and/or family.       **Please Note: This note may have been constructed using a voice recognition system**

## 2023-09-03 NOTE — ASSESSMENT & PLAN NOTE
· History suicide attempt back in July 2022 with urine history of self mutilating. · Patient currently follows with Behavioral Health on the outpatient setting. · POA expressed inability to reach out to [address] [facility] team today and subsequently admitting self mutilation with utility knife blade to cut her left anterior forearm. · At the ED patient able to reach out to [address] [facility] team.  · Patient denied active suicidal ideation however did mention and admits history of self-mutilation and methamphetamine use due to increased anxiety. · Upon questioning from admitting provider about current CODE STATUS explaining CPR and intubation patient noted significantly anxious, avoiding eye contact however answering Level 1 as her CODE STATUS.     Plan:  · Inpatient Consult behavioral health/psych evaluation appreciate recommendations while inpatient  · Naltrexone 50 mg recommended by behavioral health for self-harming behaviors, but patient deferred to outpatient psych team

## 2023-09-03 NOTE — NURSING NOTE
Patient heard from hallway with crying outbursts and throwing objects in her room. Patient observed pulling off telemetry leads and stated "my pharmacy is closed, I want to go home"     Patient inconsolable at this time, therefore [facility] resident 5 notified. Dr Tony Maki at the bedside to assess patient. Medication administered per order.

## 2023-09-03 NOTE — ASSESSMENT & PLAN NOTE
· POA started dyspnea, operatory chest tightness worsening upon ambulation no amilcar chest pain, fevers, chills, cough. · No personal history DVT/PE  · At the ED D-dimer elevated over 20.00  · CT PE significant for "Large saddle embolus is seen which extends into the right and left lower lobar and several bilateral lower lobe segmental pulmonary arterial branches. There is associated evidence of right heart strain."  · At the ED patient started on IV heparin drip and subsequently critical care attending was contacted no indication for thrombectomy patient okay to be admitted to [address] [place] [address].   · Echocardiogram was remarkable for pulmonary hypertension, otherwise unremarkable  · Left lower extremity Doppler showed nonocclusive DVT, most likely the source of saddle PE    Plan  · Start Eliquis  · Patient will benefit outpatient hematology for further management evaluation upon discharge suspect patient will need lifetime anticoagulation  · Holding home estradiol given acute PE

## 2023-09-03 NOTE — CASE MANAGEMENT
Case Management Progress Note    Patient name Axel Teague  Location S [address] [place] [place] [address] [address] [place] [id number] MRN [id number]  : 1970 Date 9/3/2023       LOS (days): 1  Geometric Mean LOS (GMLOS) (days):   Days to [facility]:        OBJECTIVE:        Current admission status: Inpatient  Preferred Pharmacy:   [address] [facility] [place], [facility], [facility].  [address] [facility] [address]  Phone: [phone] Fax: [address]  ([place]) [place] [address]  Phone: [phone] Fax: [phone]    Primary Care Provider: JHONATAN Richard    Primary Insurance: MEDICARE  Secondary Insurance: [facility] NOTE:  CM made several attempts to contact with Pt's local [address] [place] [address] in an attempt to complete a price check on Eliquis but was unable to get an answer. Dr. Brandy Rey has forward the Eliquis script to [facility], and CM will follow up.

## 2023-09-03 NOTE — DISCHARGE INSTR - AVS FIRST PAGE
Dear Aly Curtis,     It was our pleasure to care for you here at [facility]. It is our hope that we were always able to exceed the expected standards for your care during your stay. You were hospitalized due to blood clots. You were cared for on the S2nd floor by Kerrie Donald MD under the service of Jewels Aguiar MD with the [facility] who covers for your primary care physician (PCP), JHONATAN Michael, while you were hospitalized. If you have any questions or concerns related to this hospitalization, you may contact us at [phone]. For follow up as well as any medication refills, we recommend that you follow up with your primary care physician. A registered nurse will reach out to you by phone within a few days after your discharge to answer any additional questions that you may have after going home. However, at this time we provide for you here, the most important instructions / recommendations at discharge:     Notable Medication Adjustments -   Start taking Eliquis 10 mg two times daily for 7 days and then 5 mg two times daily for 6 months altogether (unless instructed otherwise on hematology follow up).   Continue your home medications  Testing Required after Discharge -   None  ** Please contact your PCP to request testing orders for any of the testing recommended here **  Important follow up information -   Please follow up with hematology to investigate the reason of your blood clots and to finalize your blood thinner regimen  Please follow up with your PCP  Other Instructions -   Come back to the hospital for worsening of the symptoms or any concerning issues  Please review this entire after visit summary as additional general instructions including medication list, appointments, activity, diet, any pertinent wound care, and other additional recommendations from your care team that may be provided for you.      Sincerely,     Ricardo Villegas MD

## 2023-09-03 NOTE — ASSESSMENT & PLAN NOTE
· POA noted saturating 90% on room air which was placed on 2 L nasal cannula  · Weaned off to room air  · Likely in the setting of acute PE with associated history of MAG versus obesity hypoventilation syndrome  · BiPAP at bedtime

## 2023-09-03 NOTE — CASE MANAGEMENT
Case Management Progress Note    Patient name Kuldeep Cristina  Location S [address] [place] [address] [address] [facility] [place] 712[address]07 MRN [id number]  : 1970 Date 9/3/2023       LOS (days): 1  Geometric Mean LOS (GMLOS) (days):   Days to [facility]:        OBJECTIVE:        Current admission status: Inpatient  Preferred Pharmacy:   [facility] [place], [address] [facility].  [address] [place] [address]  Phone: [phone] Fax: [address] [facility] [address]  Phone: [phone] Fax: [phone]    Primary Care Provider: JHONATAN Greenfield    Primary Insurance: MEDICARE  Secondary Insurance: [facility]    PROGRESS NOTE:  [address] reported the Pt would have a financial responsibility of $0 copay for Eliquis. CM made Dr. Trevor Casillas aware.

## 2023-09-04 ENCOUNTER — TRANSITIONAL CARE MANAGEMENT (OUTPATIENT)
Dept: FAMILY MEDICINE CLINIC | Facility: CLINIC | Age: 53
End: 2023-09-04

## 2023-09-05 DIAGNOSIS — Z71.89 COORDINATION OF COMPLEX CARE: Primary | ICD-10-CM

## 2023-09-06 ENCOUNTER — OFFICE VISIT (OUTPATIENT)
Dept: FAMILY MEDICINE CLINIC | Facility: CLINIC | Age: 53
End: 2023-09-06

## 2023-09-06 ENCOUNTER — HOSPITAL ENCOUNTER (INPATIENT)
Facility: HOSPITAL | Age: 53
LOS: 5 days | Discharge: HOME | DRG: 175 | End: 2023-09-12
Attending: EMERGENCY MEDICINE | Admitting: INTERNAL MEDICINE
Payer: MEDICARE

## 2023-09-06 ENCOUNTER — PATIENT OUTREACH (OUTPATIENT)
Dept: FAMILY MEDICINE CLINIC | Facility: CLINIC | Age: 53
End: 2023-09-06

## 2023-09-06 ENCOUNTER — APPOINTMENT (EMERGENCY)
Dept: RADIOLOGY | Facility: HOSPITAL | Age: 53
DRG: 175 | End: 2023-09-06
Payer: MEDICARE

## 2023-09-06 ENCOUNTER — NURSE TRIAGE (OUTPATIENT)
Dept: OTHER | Facility: OTHER | Age: 53
End: 2023-09-06

## 2023-09-06 VITALS
DIASTOLIC BLOOD PRESSURE: 78 MMHG | HEIGHT: 66 IN | HEART RATE: 71 BPM | BODY MASS INDEX: 45.64 KG/M2 | WEIGHT: 284 LBS | OXYGEN SATURATION: 97 % | TEMPERATURE: 97.2 F | SYSTOLIC BLOOD PRESSURE: 104 MMHG

## 2023-09-06 DIAGNOSIS — F25.0 SCHIZOAFFECTIVE DISORDER, BIPOLAR TYPE (HCC): ICD-10-CM

## 2023-09-06 DIAGNOSIS — F41.9 ANXIETY: ICD-10-CM

## 2023-09-06 DIAGNOSIS — L85.3 XEROSIS OF SKIN: ICD-10-CM

## 2023-09-06 DIAGNOSIS — R60.0 BILATERAL LEG EDEMA: ICD-10-CM

## 2023-09-06 DIAGNOSIS — I26.92 ACUTE SADDLE PULMONARY EMBOLISM, UNSPECIFIED WHETHER ACUTE COR PULMONALE PRESENT (HCC): Primary | ICD-10-CM

## 2023-09-06 DIAGNOSIS — R06.02 SOB (SHORTNESS OF BREATH): ICD-10-CM

## 2023-09-06 DIAGNOSIS — R07.9 CHEST PAIN: ICD-10-CM

## 2023-09-06 DIAGNOSIS — I26.92 SADDLE EMBOLISM OF PULMONARY ARTERY (HCC): Primary | ICD-10-CM

## 2023-09-06 LAB
APTT PPP: 31 SECONDS (ref 23–37)
BASE EX.OXY STD BLDV CALC-SCNC: 95.3 % (ref 60–80)
BASE EXCESS BLDV CALC-SCNC: -2.1 MMOL/L
BASOPHILS # BLD AUTO: 0.03 THOUSANDS/ÂΜL (ref 0–0.1)
BASOPHILS NFR BLD AUTO: 0 % (ref 0–1)
EOSINOPHIL # BLD AUTO: 0.1 THOUSAND/ÂΜL (ref 0–0.61)
EOSINOPHIL NFR BLD AUTO: 1 % (ref 0–6)
ERYTHROCYTE [DISTWIDTH] IN BLOOD BY AUTOMATED COUNT: 13.4 % (ref 11.6–15.1)
GLUCOSE SERPL-MCNC: 107 MG/DL (ref 65–140)
HCO3 BLDV-SCNC: 21.9 MMOL/L (ref 24–30)
HCT VFR BLD AUTO: 45.8 % (ref 34.8–46.1)
HGB BLD-MCNC: 14.6 G/DL (ref 11.5–15.4)
IMM GRANULOCYTES # BLD AUTO: 0.16 THOUSAND/UL (ref 0–0.2)
IMM GRANULOCYTES NFR BLD AUTO: 2 % (ref 0–2)
INR PPP: 1.02 (ref 0.84–1.19)
LYMPHOCYTES # BLD AUTO: 2.69 THOUSANDS/ÂΜL (ref 0.6–4.47)
LYMPHOCYTES NFR BLD AUTO: 27 % (ref 14–44)
MCH RBC QN AUTO: 30.6 PG (ref 26.8–34.3)
MCHC RBC AUTO-ENTMCNC: 31.9 G/DL (ref 31.4–37.4)
MCV RBC AUTO: 96 FL (ref 82–98)
MONOCYTES # BLD AUTO: 0.9 THOUSAND/ÂΜL (ref 0.17–1.22)
MONOCYTES NFR BLD AUTO: 9 % (ref 4–12)
NEUTROPHILS # BLD AUTO: 5.97 THOUSANDS/ÂΜL (ref 1.85–7.62)
NEUTS SEG NFR BLD AUTO: 61 % (ref 43–75)
NRBC BLD AUTO-RTO: 0 /100 WBCS
O2 CT BLDV-SCNC: 20.9 ML/DL
PCO2 BLDV: 35.9 MM HG (ref 42–50)
PH BLDV: 7.4 [PH] (ref 7.3–7.4)
PLATELET # BLD AUTO: 152 THOUSANDS/UL (ref 149–390)
PMV BLD AUTO: 11.1 FL (ref 8.9–12.7)
PO2 BLDV: 130.2 MM HG (ref 35–45)
PROTHROMBIN TIME: 14.1 SECONDS (ref 11.6–14.5)
RBC # BLD AUTO: 4.77 MILLION/UL (ref 3.81–5.12)
WBC # BLD AUTO: 9.85 THOUSAND/UL (ref 4.31–10.16)

## 2023-09-06 PROCEDURE — 99285 EMERGENCY DEPT VISIT HI MDM: CPT | Performed by: EMERGENCY MEDICINE

## 2023-09-06 PROCEDURE — 83735 ASSAY OF MAGNESIUM: CPT

## 2023-09-06 PROCEDURE — 96375 TX/PRO/DX INJ NEW DRUG ADDON: CPT

## 2023-09-06 PROCEDURE — 82077 ASSAY SPEC XCP UR&BREATH IA: CPT

## 2023-09-06 PROCEDURE — 36415 COLL VENOUS BLD VENIPUNCTURE: CPT

## 2023-09-06 PROCEDURE — 85025 COMPLETE CBC W/AUTO DIFF WBC: CPT

## 2023-09-06 PROCEDURE — 83880 ASSAY OF NATRIURETIC PEPTIDE: CPT

## 2023-09-06 PROCEDURE — 99285 EMERGENCY DEPT VISIT HI MDM: CPT

## 2023-09-06 PROCEDURE — 80053 COMPREHEN METABOLIC PANEL: CPT

## 2023-09-06 PROCEDURE — 80143 DRUG ASSAY ACETAMINOPHEN: CPT

## 2023-09-06 PROCEDURE — 0241U HB NFCT DS VIR RESP RNA 4 TRGT: CPT

## 2023-09-06 PROCEDURE — 80179 DRUG ASSAY SALICYLATE: CPT

## 2023-09-06 PROCEDURE — 71045 X-RAY EXAM CHEST 1 VIEW: CPT

## 2023-09-06 PROCEDURE — 93005 ELECTROCARDIOGRAM TRACING: CPT

## 2023-09-06 PROCEDURE — 84703 CHORIONIC GONADOTROPIN ASSAY: CPT

## 2023-09-06 PROCEDURE — 82805 BLOOD GASES W/O2 SATURATION: CPT

## 2023-09-06 PROCEDURE — 84484 ASSAY OF TROPONIN QUANT: CPT

## 2023-09-06 PROCEDURE — 85610 PROTHROMBIN TIME: CPT

## 2023-09-06 PROCEDURE — 85730 THROMBOPLASTIN TIME PARTIAL: CPT

## 2023-09-06 PROCEDURE — 82948 REAGENT STRIP/BLOOD GLUCOSE: CPT

## 2023-09-06 RX ORDER — FUROSEMIDE 20 MG/1
20 TABLET ORAL DAILY
Qty: 5 TABLET | Refills: 0 | Status: SHIPPED | OUTPATIENT
Start: 2023-09-06

## 2023-09-06 RX ORDER — LORAZEPAM 2 MG/ML
INJECTION INTRAMUSCULAR
Status: DISCONTINUED
Start: 2023-09-06 | End: 2023-09-07 | Stop reason: WASHOUT

## 2023-09-06 RX ORDER — CLOBETASOL PROPIONATE 0.5 MG/G
OINTMENT TOPICAL
Qty: 30 G | Refills: 0 | Status: SHIPPED | OUTPATIENT
Start: 2023-09-06

## 2023-09-06 RX ORDER — LORAZEPAM 2 MG/ML
0.5 INJECTION INTRAMUSCULAR ONCE
Status: COMPLETED | OUTPATIENT
Start: 2023-09-06 | End: 2023-09-06

## 2023-09-06 RX ADMIN — LORAZEPAM 0.5 MG: 2 INJECTION INTRAMUSCULAR; INTRAVENOUS at 23:39

## 2023-09-06 NOTE — TELEPHONE ENCOUNTER
Caller: ana    Doctor: virgil    Reason for call: pt is now on blood thinners. Will this be ok with her MRI?     Call back#: [phone]

## 2023-09-06 NOTE — LETTER
September 6, 2023     Patient: Lisa Miles  YOB: 1970  Date of Visit: 9/6/2023      To Whom it May Concern:    Alessandra Mcfarland is under my professional care. Poppy Ba was seen in my office on 9/6/2023. Poppy Ba is on blood thinner, eliquis. Would like this known before Dentist appointment. If you have any questions or concerns, please don't hesitate to call.          Sincerely,          JHONATAN Odell        CC: No Recipients

## 2023-09-06 NOTE — TELEPHONE ENCOUNTER
S/w pt and advised no contraindication to taking blood thinners and having MRI done. Pt verbalized understanding and appreciation.

## 2023-09-06 NOTE — LETTER
[address] [place] [address]  Dept: [phone]    September 12, 2023     Patient: Fany Pressley   YOB: 1970   Date of Visit: 9/6/2023       To Whom it May Concern:    Nesha Mcfadden is under my professional care. She was seen in the hospital from 9/6/2023 to 09/12/23. Please excuse Jose Antonio Garza for any missed days that occurred during this hospitalization. She may return to work on September 22, 2023 without limitations. If you have any questions or concerns, please don't hesitate to call.          Sincerely,          Kayla Browning, DO

## 2023-09-06 NOTE — Clinical Note
Case was discussed with ERIKA and the patient's admission status was agreed to be Admission Status: observation status to the service of Dr. Aamir Linn .

## 2023-09-06 NOTE — LETTER
September 6, 2023     Patient: Ted Jeronimo  YOB: 1970  Date of Visit: 9/6/2023      To Whom it May Concern:    Monique Manley is under my professional care. Keren Ash was seen in my office on 9/6/2023. Keren Ash may return to work on 09/11/23 . If you have any questions or concerns, please don't hesitate to call.          Sincerely,          JHONATAN Saravia        CC: No Recipients

## 2023-09-06 NOTE — PROGRESS NOTES
Assessment & Plan     1. Acute saddle pulmonary embolism, unspecified whether acute cor pulmonale present [facility])  Assessment & Plan:  Presented to the ED on 09/01 with dyspnea, chest tightness/pain, fevers, chills, cough   · Elevated D-Dimer >20.0   · CT PE: Large saddle embolus into the right and left lower lobes and several bilateral lower lobe segmental pulmonary arterial branches associated with right heart strain  · Started on IV heparin drip at home and transition to Eliquis   · Left lower extremity Doppler: Nonocclusive DVT  · Estradiol discontinued  Follow-up with hematology for further management      2. Xerosis of skin  -     clobetasol (TEMOVATE) 0.05 % ointment; Apply to affected areas sparingly. Do not exceed 2 weeks of treatment. Subjective     Transitional Care Management Review:   Rashmi Titus is a 47 y.o. female here for TCM follow up. During the TCM phone call patient stated:  TCM Call     Date and time call was made  9/4/2023  6:00 PM    Hospital care reviewed  Records not available    Patient was hospitialized at  [address] [place] [address]    Date of Admission  09/01/23    Date of discharge  09/03/23    Diagnosis  Acute saddle pulmonary embolism    Disposition  Home    Were the patients medications reviewed and updated  Yes    Current Symptoms  None      TCM Call     Post hospital issues  None    Should patient be enrolled in anticoag monitoring? Yes    Scheduled for follow up?   Yes    Did you obtain your prescribed medications  Yes    Do you need help managing your prescriptions or medications  No    Is transportation to your appointment needed  Yes    I have advised the patient to call PCP with any new or worsening symptoms  emp    Living Arrangements  Alone    Are you recieving any outpatient services  No    What type of services      Are you recieving home care services  No    Are you using any community resources  No    Which resources are you recieving   Current waiver services  No    Have you fallen in the last 12 months  No    Interperter language line needed  No    Counseling  Patient        09/01  presented to the ED after noted dyspnea on exertion with associated chest tightness mostly during inspiration. Endorses sedentary life, family history of DVT/PE in her grandmother and her mother, chronic lower extremity edema and discomfort int he left lower extremity. Saddle PE seen on CTA PE. Patient was in acute respiratory failure with hypoxia requiring 2 L of nasal cannula to saturate in the 90s but then was weaned off to room air. Echo that showed right end-systolic pressure 60, EF normal.  Left lower extremity nonocclusive acute DVT. She was treated with heparin drip and then was transitioned to NOAC getting discharged today, will follow-up with hematology. Review of Systems   Constitutional: Negative for activity change and chills. HENT: Negative for ear pain and trouble swallowing. Eyes: Negative for pain and visual disturbance. Respiratory: Negative for chest tightness. Cardiovascular: Negative for chest pain, palpitations and leg swelling. Gastrointestinal: Negative for abdominal pain, constipation and nausea. Genitourinary: Negative for difficulty urinating, dysuria, hematuria and urgency. Musculoskeletal: Negative for arthralgias and back pain. Skin: Negative for color change. Neurological: Negative for dizziness, seizures, syncope and headaches. Psychiatric/Behavioral: Negative for dysphoric mood. The patient is not nervous/anxious. All other systems reviewed and are negative. Objective     /78 (BP Location: Left arm, Patient Position: Sitting, Cuff Size: Adult)   Pulse 71   Temp (!) 97.2 °F (36.2 °C) (Temporal)   Ht 5' 6" (1.676 m)   Wt 129 kg (284 lb)   SpO2 97%   BMI 45.84 kg/m²      Physical Exam  Vitals and nursing note reviewed. Constitutional:       General: She is not in acute distress. Appearance: Normal appearance. She is well-developed. HENT:      Head: Normocephalic and atraumatic. Right Ear: Tympanic membrane, ear canal and external ear normal. There is no impacted cerumen. Left Ear: Tympanic membrane, ear canal and external ear normal. There is no impacted cerumen. Nose: Nose normal.      Mouth/Throat:      Mouth: Mucous membranes are moist.   Eyes:      Conjunctiva/sclera: Conjunctivae normal.      Pupils: Pupils are equal, round, and reactive to light. Cardiovascular:      Rate and Rhythm: Normal rate and regular rhythm. Heart sounds: No murmur heard. Pulmonary:      Effort: Pulmonary effort is normal. No respiratory distress. Breath sounds: Normal breath sounds. Abdominal:      Palpations: Abdomen is soft. Tenderness: There is no abdominal tenderness. Musculoskeletal:         General: Normal range of motion. Cervical back: Neck supple. Right lower leg: No edema. Left lower leg: No edema. Skin:     General: Skin is warm and dry. Capillary Refill: Capillary refill takes less than 2 seconds. Neurological:      General: No focal deficit present. Mental Status: She is alert and oriented to person, place, and time. Mental status is at baseline.    Psychiatric:         Mood and Affect: Mood normal.         Behavior: Behavior normal.       Medications have been reviewed by provider in current encounter    JHONATAN Nunez

## 2023-09-06 NOTE — ASSESSMENT & PLAN NOTE
Presented to the ED on 09/01 with dyspnea, chest tightness/pain, fevers, chills, cough   · Elevated D-Dimer >20.0   · CT PE: Large saddle embolus into the right and left lower lobes and several bilateral lower lobe segmental pulmonary arterial branches associated with right heart strain  · Started on IV heparin drip at home and transition to Eliquis   · Left lower extremity Doppler: Nonocclusive DVT  · Estradiol discontinued  Follow-up with hematology for further management

## 2023-09-07 ENCOUNTER — APPOINTMENT (EMERGENCY)
Dept: CT IMAGING | Facility: HOSPITAL | Age: 53
DRG: 175 | End: 2023-09-07
Payer: MEDICARE

## 2023-09-07 ENCOUNTER — APPOINTMENT (OUTPATIENT)
Dept: NON INVASIVE DIAGNOSTICS | Facility: HOSPITAL | Age: 53
DRG: 175 | End: 2023-09-07
Payer: MEDICARE

## 2023-09-07 PROBLEM — J96.00 ACUTE RESPIRATORY FAILURE (HCC): Status: ACTIVE | Noted: 2023-09-02

## 2023-09-07 LAB
2HR DELTA HS TROPONIN: 0 NG/L
4HR DELTA HS TROPONIN: 0 NG/L
ALBUMIN SERPL BCP-MCNC: 3.7 G/DL (ref 3.5–5)
ALP SERPL-CCNC: 56 U/L (ref 34–104)
ALT SERPL W P-5'-P-CCNC: 35 U/L (ref 7–52)
AMPHETAMINES SERPL QL SCN: POSITIVE
ANION GAP SERPL CALCULATED.3IONS-SCNC: 7 MMOL/L
ANION GAP SERPL CALCULATED.3IONS-SCNC: 7 MMOL/L
APAP SERPL-MCNC: <10 UG/ML (ref 10–20)
APICAL FOUR CHAMBER EJECTION FRACTION: 62 %
APTT PPP: >210 SECONDS (ref 23–37)
AST SERPL W P-5'-P-CCNC: 25 U/L (ref 13–39)
BACTERIA UR QL AUTO: ABNORMAL /HPF
BARBITURATES UR QL: NEGATIVE
BASOPHILS # BLD AUTO: 0.06 THOUSANDS/ÂΜL (ref 0–0.1)
BASOPHILS NFR BLD AUTO: 1 % (ref 0–1)
BENZODIAZ UR QL: NEGATIVE
BILIRUB SERPL-MCNC: 0.2 MG/DL (ref 0.2–1)
BILIRUB UR QL STRIP: NEGATIVE
BNP SERPL-MCNC: 25 PG/ML (ref 0–100)
BUN SERPL-MCNC: 12 MG/DL (ref 5–25)
BUN SERPL-MCNC: 13 MG/DL (ref 5–25)
CALCIUM SERPL-MCNC: 8.5 MG/DL (ref 8.4–10.2)
CALCIUM SERPL-MCNC: 8.6 MG/DL (ref 8.4–10.2)
CARDIAC TROPONIN I PNL SERPL HS: 3 NG/L
CHLORIDE SERPL-SCNC: 109 MMOL/L (ref 96–108)
CHLORIDE SERPL-SCNC: 111 MMOL/L (ref 96–108)
CLARITY UR: CLEAR
CO2 SERPL-SCNC: 24 MMOL/L (ref 21–32)
CO2 SERPL-SCNC: 25 MMOL/L (ref 21–32)
COCAINE UR QL: NEGATIVE
COLOR UR: ABNORMAL
CREAT SERPL-MCNC: 0.63 MG/DL (ref 0.6–1.3)
CREAT SERPL-MCNC: 0.73 MG/DL (ref 0.6–1.3)
EOSINOPHIL # BLD AUTO: 0.12 THOUSAND/ÂΜL (ref 0–0.61)
EOSINOPHIL NFR BLD AUTO: 1 % (ref 0–6)
ERYTHROCYTE [DISTWIDTH] IN BLOOD BY AUTOMATED COUNT: 13.7 % (ref 11.6–15.1)
ETHANOL SERPL-MCNC: <10 MG/DL
FLUAV RNA RESP QL NAA+PROBE: NEGATIVE
FLUBV RNA RESP QL NAA+PROBE: NEGATIVE
GFR SERPL CREATININE-BSD FRML MDRD: 104 ML/MIN/1.73SQ M
GFR SERPL CREATININE-BSD FRML MDRD: 95 ML/MIN/1.73SQ M
GLUCOSE SERPL-MCNC: 102 MG/DL (ref 65–140)
GLUCOSE SERPL-MCNC: 105 MG/DL (ref 65–140)
GLUCOSE UR STRIP-MCNC: NEGATIVE MG/DL
HCG SERPL QL: NEGATIVE
HCT VFR BLD AUTO: 43.8 % (ref 34.8–46.1)
HGB BLD-MCNC: 14.1 G/DL (ref 11.5–15.4)
HGB UR QL STRIP.AUTO: NEGATIVE
IMM GRANULOCYTES # BLD AUTO: 0.14 THOUSAND/UL (ref 0–0.2)
IMM GRANULOCYTES NFR BLD AUTO: 2 % (ref 0–2)
KETONES UR STRIP-MCNC: NEGATIVE MG/DL
LAAS-AP2: 16.6 CM2
LAAS-AP4: 20.5 CM2
LEFT ATRIUM VOLUME (MOD BIPLANE): 48 ML
LEUKOCYTE ESTERASE UR QL STRIP: NEGATIVE
LYMPHOCYTES # BLD AUTO: 3.04 THOUSANDS/ÂΜL (ref 0.6–4.47)
LYMPHOCYTES NFR BLD AUTO: 32 % (ref 14–44)
MAGNESIUM SERPL-MCNC: 2.2 MG/DL (ref 1.9–2.7)
MCH RBC QN AUTO: 30.7 PG (ref 26.8–34.3)
MCHC RBC AUTO-ENTMCNC: 32.2 G/DL (ref 31.4–37.4)
MCV RBC AUTO: 95 FL (ref 82–98)
METHADONE UR QL: NEGATIVE
MONOCYTES # BLD AUTO: 0.86 THOUSAND/ÂΜL (ref 0.17–1.22)
MONOCYTES NFR BLD AUTO: 9 % (ref 4–12)
MUCOUS THREADS UR QL AUTO: ABNORMAL
NEUTROPHILS # BLD AUTO: 5.42 THOUSANDS/ÂΜL (ref 1.85–7.62)
NEUTS SEG NFR BLD AUTO: 55 % (ref 43–75)
NITRITE UR QL STRIP: NEGATIVE
NON-SQ EPI CELLS URNS QL MICRO: ABNORMAL /HPF
NRBC BLD AUTO-RTO: 0 /100 WBCS
OPIATES UR QL SCN: NEGATIVE
OXYCODONE+OXYMORPHONE UR QL SCN: NEGATIVE
PCP UR QL: NEGATIVE
PH UR STRIP.AUTO: 6.5 [PH]
PLATELET # BLD AUTO: 146 THOUSANDS/UL (ref 149–390)
PMV BLD AUTO: 10.5 FL (ref 8.9–12.7)
POTASSIUM SERPL-SCNC: 3.9 MMOL/L (ref 3.5–5.3)
POTASSIUM SERPL-SCNC: 4 MMOL/L (ref 3.5–5.3)
PROT SERPL-MCNC: 6.1 G/DL (ref 6.4–8.4)
PROT UR STRIP-MCNC: ABNORMAL MG/DL
RA PRESSURE ESTIMATED: 8 MMHG
RBC # BLD AUTO: 4.6 MILLION/UL (ref 3.81–5.12)
RBC #/AREA URNS AUTO: ABNORMAL /HPF
RIGHT ATRIAL 2D VOLUME: 43 ML
RIGHT ATRIUM AREA SYSTOLE A4C: 16.4 CM2
RIGHT VENTRICLE ID DIMENSION: 3.8 CM
RSV RNA RESP QL NAA+PROBE: NEGATIVE
RV PSP: 43 MMHG
SALICYLATES SERPL-MCNC: <5 MG/DL (ref 3–20)
SARS-COV-2 RNA RESP QL NAA+PROBE: NEGATIVE
SL CV LEFT ATRIUM LENGTH A2C: 5.6 CM
SL CV LV EF: 60
SODIUM SERPL-SCNC: 140 MMOL/L (ref 135–147)
SODIUM SERPL-SCNC: 143 MMOL/L (ref 135–147)
SP GR UR STRIP.AUTO: >=1.05 (ref 1–1.03)
THC UR QL: POSITIVE
TR MAX PG: 35 MMHG
TR PEAK VELOCITY: 3 M/S
TRICUSPID ANNULAR PLANE SYSTOLIC EXCURSION: 2 CM
TRICUSPID VALVE PEAK REGURGITATION VELOCITY: 2.95 M/S
UROBILINOGEN UR STRIP-ACNC: <2 MG/DL
WBC # BLD AUTO: 9.64 THOUSAND/UL (ref 4.31–10.16)
WBC #/AREA URNS AUTO: ABNORMAL /HPF

## 2023-09-07 PROCEDURE — 93325 DOPPLER ECHO COLOR FLOW MAPG: CPT | Performed by: INTERNAL MEDICINE

## 2023-09-07 PROCEDURE — 96365 THER/PROPH/DIAG IV INF INIT: CPT

## 2023-09-07 PROCEDURE — 93321 DOPPLER ECHO F-UP/LMTD STD: CPT | Performed by: INTERNAL MEDICINE

## 2023-09-07 PROCEDURE — 81001 URINALYSIS AUTO W/SCOPE: CPT

## 2023-09-07 PROCEDURE — 93321 DOPPLER ECHO F-UP/LMTD STD: CPT

## 2023-09-07 PROCEDURE — 93308 TTE F-UP OR LMTD: CPT

## 2023-09-07 PROCEDURE — 80307 DRUG TEST PRSMV CHEM ANLYZR: CPT

## 2023-09-07 PROCEDURE — 85025 COMPLETE CBC W/AUTO DIFF WBC: CPT | Performed by: INTERNAL MEDICINE

## 2023-09-07 PROCEDURE — 93308 TTE F-UP OR LMTD: CPT | Performed by: INTERNAL MEDICINE

## 2023-09-07 PROCEDURE — 71275 CT ANGIOGRAPHY CHEST: CPT

## 2023-09-07 PROCEDURE — 80048 BASIC METABOLIC PNL TOTAL CA: CPT | Performed by: INTERNAL MEDICINE

## 2023-09-07 PROCEDURE — 36415 COLL VENOUS BLD VENIPUNCTURE: CPT

## 2023-09-07 PROCEDURE — 84484 ASSAY OF TROPONIN QUANT: CPT

## 2023-09-07 PROCEDURE — 99223 1ST HOSP IP/OBS HIGH 75: CPT | Performed by: INTERNAL MEDICINE

## 2023-09-07 PROCEDURE — G1004 CDSM NDSC: HCPCS

## 2023-09-07 PROCEDURE — 96375 TX/PRO/DX INJ NEW DRUG ADDON: CPT

## 2023-09-07 PROCEDURE — 93325 DOPPLER ECHO COLOR FLOW MAPG: CPT

## 2023-09-07 PROCEDURE — 85730 THROMBOPLASTIN TIME PARTIAL: CPT | Performed by: INTERNAL MEDICINE

## 2023-09-07 RX ORDER — VENLAFAXINE 37.5 MG/1
37.5 TABLET ORAL DAILY
Status: DISCONTINUED | OUTPATIENT
Start: 2023-09-07 | End: 2023-09-12 | Stop reason: HOSPADM

## 2023-09-07 RX ORDER — FUROSEMIDE 20 MG/1
20 TABLET ORAL DAILY
Status: DISCONTINUED | OUTPATIENT
Start: 2023-09-07 | End: 2023-09-12 | Stop reason: HOSPADM

## 2023-09-07 RX ORDER — ALBUTEROL SULFATE 90 UG/1
2 AEROSOL, METERED RESPIRATORY (INHALATION) EVERY 6 HOURS PRN
Status: DISCONTINUED | OUTPATIENT
Start: 2023-09-07 | End: 2023-09-12 | Stop reason: HOSPADM

## 2023-09-07 RX ORDER — HEPARIN SODIUM 1000 [USP'U]/ML
5000 INJECTION, SOLUTION INTRAVENOUS; SUBCUTANEOUS EVERY 6 HOURS PRN
Status: DISCONTINUED | OUTPATIENT
Start: 2023-09-07 | End: 2023-09-07

## 2023-09-07 RX ORDER — LURASIDONE HYDROCHLORIDE 20 MG/1
60 TABLET, FILM COATED ORAL 2 TIMES DAILY
Status: DISCONTINUED | OUTPATIENT
Start: 2023-09-07 | End: 2023-09-12 | Stop reason: HOSPADM

## 2023-09-07 RX ORDER — ENOXAPARIN SODIUM 150 MG/ML
1 INJECTION SUBCUTANEOUS EVERY 12 HOURS
Status: DISCONTINUED | OUTPATIENT
Start: 2023-09-07 | End: 2023-09-12 | Stop reason: HOSPADM

## 2023-09-07 RX ORDER — CARBAMAZEPINE 100 MG/1
300 TABLET, EXTENDED RELEASE ORAL EVERY 12 HOURS
Status: DISCONTINUED | OUTPATIENT
Start: 2023-09-07 | End: 2023-09-12 | Stop reason: HOSPADM

## 2023-09-07 RX ORDER — OXYBUTYNIN CHLORIDE 5 MG/1
5 TABLET ORAL 2 TIMES DAILY
Status: DISCONTINUED | OUTPATIENT
Start: 2023-09-07 | End: 2023-09-12 | Stop reason: HOSPADM

## 2023-09-07 RX ORDER — HEPARIN SODIUM 1000 [USP'U]/ML
10000 INJECTION, SOLUTION INTRAVENOUS; SUBCUTANEOUS EVERY 6 HOURS PRN
Status: DISCONTINUED | OUTPATIENT
Start: 2023-09-07 | End: 2023-09-07

## 2023-09-07 RX ORDER — MONTELUKAST SODIUM 4 MG/1
1 TABLET, CHEWABLE ORAL 2 TIMES DAILY
Status: DISCONTINUED | OUTPATIENT
Start: 2023-09-07 | End: 2023-09-09

## 2023-09-07 RX ORDER — LEVOTHYROXINE SODIUM 0.12 MG/1
125 TABLET ORAL
Status: DISCONTINUED | OUTPATIENT
Start: 2023-09-07 | End: 2023-09-12 | Stop reason: HOSPADM

## 2023-09-07 RX ORDER — PILOCARPINE HYDROCHLORIDE 5 MG/1
5 TABLET, FILM COATED ORAL 2 TIMES DAILY
Status: DISCONTINUED | OUTPATIENT
Start: 2023-09-07 | End: 2023-09-12 | Stop reason: HOSPADM

## 2023-09-07 RX ORDER — CEPHALEXIN 500 MG/1
500 CAPSULE ORAL 4 TIMES DAILY
Status: COMPLETED | OUTPATIENT
Start: 2023-09-07 | End: 2023-09-07

## 2023-09-07 RX ORDER — ROPINIROLE 1 MG/1
1 TABLET, FILM COATED ORAL
Status: DISCONTINUED | OUTPATIENT
Start: 2023-09-07 | End: 2023-09-12 | Stop reason: HOSPADM

## 2023-09-07 RX ORDER — AMLODIPINE BESYLATE 5 MG/1
5 TABLET ORAL DAILY
Status: DISCONTINUED | OUTPATIENT
Start: 2023-09-07 | End: 2023-09-12 | Stop reason: HOSPADM

## 2023-09-07 RX ORDER — BUPROPION HYDROCHLORIDE 150 MG/1
450 TABLET ORAL DAILY
Status: DISCONTINUED | OUTPATIENT
Start: 2023-09-07 | End: 2023-09-12 | Stop reason: HOSPADM

## 2023-09-07 RX ORDER — MELATONIN
1000 DAILY
Status: DISCONTINUED | OUTPATIENT
Start: 2023-09-07 | End: 2023-09-12 | Stop reason: HOSPADM

## 2023-09-07 RX ORDER — MEMANTINE HYDROCHLORIDE 10 MG/1
10 TABLET ORAL DAILY
Status: DISCONTINUED | OUTPATIENT
Start: 2023-09-07 | End: 2023-09-12 | Stop reason: HOSPADM

## 2023-09-07 RX ORDER — HEPARIN SODIUM 10000 [USP'U]/100ML
3-30 INJECTION, SOLUTION INTRAVENOUS
Status: DISCONTINUED | OUTPATIENT
Start: 2023-09-07 | End: 2023-09-07

## 2023-09-07 RX ORDER — LOSARTAN POTASSIUM 50 MG/1
50 TABLET ORAL DAILY
Status: DISCONTINUED | OUTPATIENT
Start: 2023-09-07 | End: 2023-09-12 | Stop reason: HOSPADM

## 2023-09-07 RX ORDER — HYDROXYZINE HYDROCHLORIDE 25 MG/1
25 TABLET, FILM COATED ORAL EVERY 6 HOURS PRN
Status: DISCONTINUED | OUTPATIENT
Start: 2023-09-07 | End: 2023-09-12 | Stop reason: HOSPADM

## 2023-09-07 RX ORDER — HEPARIN SODIUM 1000 [USP'U]/ML
10000 INJECTION, SOLUTION INTRAVENOUS; SUBCUTANEOUS ONCE
Status: COMPLETED | OUTPATIENT
Start: 2023-09-07 | End: 2023-09-07

## 2023-09-07 RX ADMIN — HYDROXYZINE HYDROCHLORIDE 25 MG: 25 TABLET ORAL at 21:49

## 2023-09-07 RX ADMIN — ENOXAPARIN SODIUM 135 MG: 150 INJECTION SUBCUTANEOUS at 10:46

## 2023-09-07 RX ADMIN — HEPARIN SODIUM 10000 UNITS: 1000 INJECTION INTRAVENOUS; SUBCUTANEOUS at 01:42

## 2023-09-07 RX ADMIN — DIVALPROEX SODIUM 750 MG: 250 TABLET, DELAYED RELEASE ORAL at 21:49

## 2023-09-07 RX ADMIN — LEVOTHYROXINE SODIUM 125 MCG: 125 TABLET ORAL at 06:17

## 2023-09-07 RX ADMIN — BUPROPION HYDROCHLORIDE 450 MG: 150 TABLET, FILM COATED, EXTENDED RELEASE ORAL at 08:31

## 2023-09-07 RX ADMIN — HYDROXYZINE HYDROCHLORIDE 25 MG: 25 TABLET ORAL at 15:52

## 2023-09-07 RX ADMIN — TOPIRAMATE 150 MG: 100 TABLET, FILM COATED ORAL at 18:24

## 2023-09-07 RX ADMIN — CEPHALEXIN 500 MG: 500 CAPSULE ORAL at 12:46

## 2023-09-07 RX ADMIN — OXYBUTYNIN CHLORIDE 5 MG: 5 TABLET ORAL at 08:31

## 2023-09-07 RX ADMIN — FUROSEMIDE 20 MG: 20 TABLET ORAL at 08:38

## 2023-09-07 RX ADMIN — IOHEXOL 85 ML: 350 INJECTION, SOLUTION INTRAVENOUS at 00:32

## 2023-09-07 RX ADMIN — PILOCARPINE HYDROCHLORIDE 5 MG: 5 TABLET, FILM COATED ORAL at 18:25

## 2023-09-07 RX ADMIN — Medication 1000 UNITS: at 08:32

## 2023-09-07 RX ADMIN — OXYBUTYNIN CHLORIDE 5 MG: 5 TABLET ORAL at 18:24

## 2023-09-07 RX ADMIN — TOPIRAMATE 150 MG: 100 TABLET, FILM COATED ORAL at 08:31

## 2023-09-07 RX ADMIN — MEMANTINE 10 MG: 10 TABLET ORAL at 08:31

## 2023-09-07 RX ADMIN — CEPHALEXIN 500 MG: 500 CAPSULE ORAL at 18:24

## 2023-09-07 RX ADMIN — ENOXAPARIN SODIUM 135 MG: 150 INJECTION SUBCUTANEOUS at 21:49

## 2023-09-07 RX ADMIN — CARBAMAZEPINE 300 MG: 100 TABLET, EXTENDED RELEASE ORAL at 08:36

## 2023-09-07 RX ADMIN — LOSARTAN POTASSIUM 50 MG: 50 TABLET, FILM COATED ORAL at 08:32

## 2023-09-07 RX ADMIN — HEPARIN SODIUM 18 UNITS/KG/HR: 10000 INJECTION, SOLUTION INTRAVENOUS at 01:47

## 2023-09-07 RX ADMIN — CEPHALEXIN 500 MG: 500 CAPSULE ORAL at 21:49

## 2023-09-07 RX ADMIN — VENLAFAXINE 37.5 MG: 37.5 TABLET ORAL at 08:32

## 2023-09-07 RX ADMIN — ALBUTEROL SULFATE 2 PUFF: 90 AEROSOL, METERED RESPIRATORY (INHALATION) at 09:21

## 2023-09-07 RX ADMIN — METOPROLOL TARTRATE 25 MG: 25 TABLET, FILM COATED ORAL at 08:31

## 2023-09-07 RX ADMIN — METOPROLOL TARTRATE 25 MG: 25 TABLET, FILM COATED ORAL at 21:49

## 2023-09-07 RX ADMIN — CARBAMAZEPINE 300 MG: 100 TABLET, EXTENDED RELEASE ORAL at 21:48

## 2023-09-07 RX ADMIN — CEPHALEXIN 500 MG: 500 CAPSULE ORAL at 06:17

## 2023-09-07 RX ADMIN — DIVALPROEX SODIUM 750 MG: 250 TABLET, DELAYED RELEASE ORAL at 08:35

## 2023-09-07 RX ADMIN — PILOCARPINE HYDROCHLORIDE 5 MG: 5 TABLET, FILM COATED ORAL at 08:35

## 2023-09-07 RX ADMIN — ROPINIROLE HYDROCHLORIDE 1 MG: 1 TABLET, FILM COATED ORAL at 21:49

## 2023-09-07 RX ADMIN — AMLODIPINE BESYLATE 5 MG: 5 TABLET ORAL at 08:32

## 2023-09-07 RX ADMIN — LURASIDONE HYDROCHLORIDE 60 MG: 20 TABLET, COATED ORAL at 08:35

## 2023-09-07 RX ADMIN — LURASIDONE HYDROCHLORIDE 60 MG: 20 TABLET, COATED ORAL at 21:49

## 2023-09-07 NOTE — ASSESSMENT & PLAN NOTE
· Presents with worsening MARTINEZ and chest pain for 1-2 days  · CXR NAD  · Recent admission from 9/2 to 9/3 with dyspnea, CP and cough and was found to have a large PE  · Also has a history of COPD and MAG, not on home oxygen  · Placed on 1L with sp02 93-95%, wean as tolerated  · Wears CPAP @ night

## 2023-09-07 NOTE — ASSESSMENT & PLAN NOTE
· History of schizoaffective disorder bipolar  · Continue home Depakote 750 mg twice daily  · Continue home carbamazepine 300 mg twice daily  · Continue home Wellbutrin 300 mg and 150 mg tablet  · Continue home lurasidone 60 mg every morning, every afternoon  · Continue home Effexor 37.5 mg once a day  · Rest of plan as above

## 2023-09-07 NOTE — ASSESSMENT & PLAN NOTE
· Continue home albuterol as needed every 6 hours  · Continue Juvenal Clark for home Flovent per pharmacy formulary

## 2023-09-07 NOTE — ASSESSMENT & PLAN NOTE
· History of substance abuse methamphetamine, marijuana admitting last use on 9/2   · Counseled regarding cessation of illicit drugs  · Symptomatic management  · Continue to monitor on telemetry

## 2023-09-07 NOTE — PLAN OF CARE
Problem: PAIN - ADULT  Goal: Verbalizes/displays adequate comfort level or baseline comfort level  Description: Interventions:  - Encourage patient to monitor pain and request assistance  - Assess pain using appropriate pain scale  - Administer analgesics based on type and severity of pain and evaluate response  - Implement non-pharmacological measures as appropriate and evaluate response  - Consider cultural and social influences on pain and pain management  - Notify physician/advanced practitioner if interventions unsuccessful or patient reports new pain  Outcome: Progressing     Problem: INFECTION - ADULT  Goal: Absence or prevention of progression during hospitalization  Description: INTERVENTIONS:  - Assess and monitor for signs and symptoms of infection  - Monitor lab/diagnostic results  - Monitor all insertion sites, i.e. indwelling lines, tubes, and drains  - Monitor endotracheal if appropriate and nasal secretions for changes in amount and color  - [place] appropriate cooling/warming therapies per order  - Administer medications as ordered  - Instruct and encourage patient and family to use good hand hygiene technique  - Identify and instruct in appropriate isolation precautions for identified infection/condition  Outcome: Progressing  Goal: Absence of fever/infection during neutropenic period  Description: INTERVENTIONS:  - Monitor WBC    Outcome: Progressing     Problem: SAFETY ADULT  Goal: Patient will remain free of falls  Description: INTERVENTIONS:  - Educate patient/family on patient safety including physical limitations  - Instruct patient to call for assistance with activity   - Consult OT/PT to assist with strengthening/mobility   - Keep Call bell within reach  - Keep bed low and locked with side rails adjusted as appropriate  - Keep care items and personal belongings within reach  - Initiate and maintain comfort rounds  - Make Fall Risk Sign visible to staff  - Offer Toileting in advance of need  - Initiate/Maintain alarm  - Obtain necessary fall risk management equipment:   - Apply yellow socks and bracelet for high fall risk patients  - Consider moving patient to room near nurses station  Outcome: Progressing  Goal: Maintain or return to baseline ADL function  Description: INTERVENTIONS:  -  Assess patient's ability to carry out ADLs; assess patient's baseline for ADL function and identify physical deficits which impact ability to perform ADLs (bathing, care of mouth/teeth, toileting, grooming, dressing, etc.)  - Assess/evaluate cause of self-care deficits   - Assess range of motion  - Assess patient's mobility; develop plan if impaired  - Assess patient's need for assistive devices and provide as appropriate  - Encourage maximum independence but intervene and supervise when necessary  - Involve family in performance of ADLs  - Assess for home care needs following discharge   - Consider OT consult to assist with ADL evaluation and planning for discharge  - Provide patient education as appropriate  Outcome: Progressing  Goal: Maintains/Returns to pre admission functional level  Description: INTERVENTIONS:  - Perform BMAT or MOVE assessment daily.   - Set and communicate daily mobility goal to care team and patient/family/caregiver.    - Collaborate with rehabilitation services on mobility goals if consulted  - Perform Range of Motion   - Reposition patient   - Dangle patient  - Stand patient   - Ambulate patient   - Out of bed to chair  - Out of bed for meals   - Out of bed for toileting  - Record patient progress and toleration of activity level   Outcome: Progressing     Problem: DISCHARGE PLANNING  Goal: Discharge to home or other facility with appropriate resources  Description: INTERVENTIONS:  - Identify barriers to discharge w/patient and caregiver  - Arrange for needed discharge resources and transportation as appropriate  - Identify discharge learning needs (meds, wound care, etc.)  - Arrange for interpretive services to assist at discharge as needed  - Refer to Case Management Department for coordinating discharge planning if the patient needs post-hospital services based on physician/advanced practitioner order or complex needs related to functional status, cognitive ability, or social support system  Outcome: Progressing     Problem: Knowledge Deficit  Goal: Patient/family/caregiver demonstrates understanding of disease process, treatment plan, medications, and discharge instructions  Description: Complete learning assessment and assess knowledge base.   Interventions:  - Provide teaching at level of understanding  - Provide teaching via preferred learning methods  Outcome: Progressing

## 2023-09-07 NOTE — TELEPHONE ENCOUNTER
Regarding: SOB/ Chest pain  ----- Message from [id number] sent at 9/6/2023 10:00 PM EDT -----  "I was taken to the ER on Friday for blood clots in my lung.  I am experiencing shortness of breath, tired and a lot of chest pain"

## 2023-09-07 NOTE — TELEPHONE ENCOUNTER
Reason for Disposition  • Sounds like a life-threatening emergency to the triager  • [1] MODERATE difficulty breathing (e.g., speaks in phrases, SOB even at rest, pulse 100-120) AND [2] NEW-onset or WORSE than normal    Answer Assessment - Initial Assessment Questions  1. RESPIRATORY STATUS: "Describe your breathing?" (e.g., wheezing, shortness of breath, unable to speak, severe coughing)       Sob and chest pain  2. ONSET: "When did this breathing problem begin?"       Sob started since her pulmonary embolism last Friday but has become more pronounced within the last hour tonight. 3. PATTERN "Does the difficult breathing come and go, or has it been constant since it started?"       Intermittent becoming more constant  4. SEVERITY: "How bad is your breathing?" (e.g., mild, moderate, severe)     - MILD: No SOB at rest, mild SOB with walking, speaks normally in sentences, can lay down, no retractions, pulse < 100.     - MODERATE: SOB at rest, SOB with minimal exertion and prefers to sit, cannot lie down flat, speaks in phrases, mild retractions, audible wheezing, pulse 100-120.     - SEVERE: Very SOB at rest, speaks in single words, struggling to breathe, sitting hunched forward, retractions, pulse > 120       Symptoms are worse if she tries to lay down. Chest pain is new and is speaking in phrases. 5. RECURRENT SYMPTOM: "Have you had difficulty breathing before?" If Yes, ask: "When was the last time?" and "What happened that time?"     She had   Mildly sob  when she had the pulmonary emboli's last Friday. 6. CARDIAC HISTORY: "Do you have any history of heart disease?" (e.g., heart attack, angina, bypass surgery, angioplasty)       none  7. LUNG HISTORY: "Do you have any history of lung disease?"  (e.g., pulmonary embolus, asthma, emphysema)      Yes pulmonary embolus  8. CAUSE: "What do you think is causing the breathing problem?"       unsure  9.  OTHER SYMPTOMS: "Do you have any other symptoms? (e.g., dizziness, runny nose, cough, chest pain, fever)      Chest pain    Protocols used: BREATHING DIFFICULTY-ADULT-AH

## 2023-09-07 NOTE — QUICK NOTE
Patient seen and examined at bedside. She is notably quite anxious. She states she is concerned about her shortness of breath, as scheduled has recurred since her discharge. However she denies any ongoing chest pain, SOB, abdominal pain, headache, or other acute concerns at this time. She states she is mostly short of breath when she is up and walking around. Informed by patient's nurse on rounds that she had just been resulted a critical PTT >210. Heparin drip was stopped and order was placed to resume Eliquis 5mg BID. However, our team was shortly thereafter contacted by pharmacist stating there is a major drug-drug interaction between Eliquis and patient's other home medication of Tegretol XR that will lead to decreased concentration of Eliquis. Per pharmacy, Tegretol will induce warfarin and Xarelto as well, so warfarin dosing would require monitoring. Patient started on therapeutic Lovenox at this time. Patient's father Gian Barney was contacted and updated. All questions answered to his satisfaction.     Hamilton Carr, DO

## 2023-09-07 NOTE — ED ATTENDING ATTESTATION
9/6/2023  IJeff DO, saw and evaluated the patient. I have discussed the patient with the resident/non-physician practitioner and agree with the resident's/non-physician practitioner's findings, Plan of Care, and MDM as documented in the resident's/non-physician practitioner's note, except where noted. All available labs and Radiology studies were reviewed. I was present for key portions of any procedure(s) performed by the resident/non-physician practitioner and I was immediately available to provide assistance. At this point I agree with the current assessment done in the Emergency Department. I have conducted an independent evaluation of this patient a history and physical is as follows:    52-year-old female, history of schizoaffective disorder, recent hospital admission with saddle pulmonary embolism and acute DVT. He stayed for 2 days, she was anticoagulated, no thrombectomy performed. He is come back today with worsening chest pain and shortness of breath. Patient is a poor historian. PE:  The patient is ill appearing, non-toxic, in NAD. She is low 90s pulse ox, on 2 L nasal cannula . head: normocephalic, atraumatic. HEENT: mucous membranes moist.  Lungs: CTA b/l, no resp distress. Heart: RRR. No M/R/G. Abdomen: NT, ND, no R/R/G. Neuro: CN2-12 intact, GCS 15. Normal strength and sensation, normal speech and gait. Cap refill < 2 sec, skin warm and dry. No rashes or lesions. Psych, flat affect. Poor story. Repeat PE study, again confirms stable saddle PE, with a 1.1 RV to LV ratio. Her PESI score is less than 3. Will place on heparin drip, I question the patient's compliance on Eliquis.   Given recurrence of symptoms, she does warrant admission to the hospital.          ED Course         Critical Care Time  Procedures

## 2023-09-07 NOTE — ASSESSMENT & PLAN NOTE
Blood Pressure: 125/69      Plan  · Continue home Norvasc 5 mg once a day,   · continue home Lasix 20 mg once a day,   · continue home losartan 50 mg once a day,   · continue home Lopressor 25 mg twice daily

## 2023-09-07 NOTE — H&P
[address] [facility]  H&P  Name: Nelly Arambula 47 y.o. female I MRN: [id number]  Unit/Bed#: S -01 I Date of Admission: 9/6/2023   Date of Service: 9/7/2023 I Hospital Day: 0      Assessment/Plan   * Acute respiratory failure ([address])  Assessment & Plan  · Presents with worsening MARTINEZ and chest pain for 1-2 days  · CXR NAD  · Recent admission from 9/2 to 9/3 with dyspnea, CP and cough and was found to have a large PE  · Also has a history of COPD and MAG, not on home oxygen  · Placed on 1L with sp02 93-95%, wean as tolerated  · Wears CPAP @ night    Acute saddle pulmonary embolism ([address])  Assessment & Plan  · Recent admission 9/2-9/3 for large PE associated with right heart strain. She was placed on IV heparin drip, transitioned to Eliquis starting on 9/3  · Returns today for MARTINEZ and CP, ED workup unremarkable. Repeat CTA showed persistent saddle pulmonary embolism, clot burden is not significantly changed since prior exam.  · ECHO on 9/2 revealed LVEF of 60%.  Systolic function is normal. Wall motion is normal. Diastolic function is normal.  · Patient endorses compliance to Eliquis regimen since discharge    Plan  · Transitioned back to heparin gtt in the ED, will continue  · Eliquis Held  · Consider restarting on DOAC while inpatient   · Continue telemetry      Schizoaffective disorder, bipolar type ([address])  Assessment & Plan  · History of schizoaffective disorder bipolar  · Continue home Depakote 750 mg twice daily  · Continue home carbamazepine 300 mg twice daily  · Continue home Wellbutrin 300 mg and 150 mg tablet  · Continue home lurasidone 60 mg every morning, every afternoon  · Continue home Effexor 37.5 mg once a day  · Rest of plan as above    Substance abuse ([address])  Assessment & Plan  · History of substance abuse methamphetamine, marijuana admitting last use on 9/2   · Counseled regarding cessation of illicit drugs  · Symptomatic management  · Continue to monitor on telemetry      COPD (chronic obstructive pulmonary disease) (HCC)  Assessment & Plan  · Continue home albuterol as needed every 6 hours  · Continue Carla Osiel for home Flovent per pharmacy formulary    MAG (obstructive sleep apnea)  Assessment & Plan  · Continue CPAP at bedtime    Sjogren's syndrome (HCC)  Assessment & Plan  · PTA pilocarpine    Overactive bladder  Assessment & Plan  · PTA Trospium chloride (Sanctura)--nonformulary  · Continue with oxybutynin    Hypertension  Assessment & Plan  Blood Pressure: 125/69      Plan  · Continue home Norvasc 5 mg once a day,   · continue home Lasix 20 mg once a day,   · continue home losartan 50 mg once a day,   · continue home Lopressor 25 mg twice daily    Hypothyroidism  Assessment & Plan  · Continue PTA levothyroxine       VTE Pharmacologic Prophylaxis: VTE Score: 13 High Risk (Score >/= 5) - Pharmacological DVT Prophylaxis Ordered: heparin drip. Sequential Compression Devices Ordered. Code Status: Level 1 - Full Code   Discussion with family: Attempted to update  (father) via phone. Unable to contact. Anticipated Length of Stay: Patient will be admitted on an observation basis with an anticipated length of stay of less than 2 midnights secondary to acute respiratory failure. Chief Complaint: Shortness of breath on exertion and Chest pain/discomfort    History of Present Illness:  Kalani Grimes is a 47 y.o. female with a PMH of recently diagnosed saddle PE, COPD, MAG, schizoaffective disorder/bipolar with history of suicidal ideation/attempt, Sjogren's syndrome and substance abuse who presents with worsening shortness of breath on exertion and chest pain/discomfort x 1-2 days. The patient was recently hospitalized from 9/2-9/3 for similar symptoms and was found to have a large saddle pulmonary embolus with right heart strain, but normal findings on echocardiogram. Risk factors included a strong family history of DVT/PE, estrogen use and obesity.   He was started on a heparin infusion and transitioned to DOAC. The patient improved symptomatically and was stable for discharge. Today, the patient admits to feeling improved following her discharge, however she started to get SOB again. She endorses compliance with Eliquis daily as prescribed. In the ED, work-up was unremarkable. Repeat CTA revealed persistent saddle pulmonary embolism with clot burden not significantly changed since prior exam.  Eliquis was held and the patient was transition back to heparin infusion. The patient will be admitted for continued management of acute PE and acute respiratory symptoms. Review of Systems:  Review of Systems   Constitutional: Negative for chills and fever. Respiratory: Positive for shortness of breath. Negative for cough. Cardiovascular: Positive for chest pain and leg swelling. Negative for palpitations. Gastrointestinal: Negative for abdominal pain, diarrhea, nausea and vomiting. Genitourinary: Negative for dysuria. Musculoskeletal: Positive for arthralgias. Neurological: Negative for dizziness and light-headedness. Psychiatric/Behavioral: Positive for dysphoric mood. Negative for confusion and suicidal ideas. The patient is nervous/anxious.         Past Medical and Surgical History:   Past Medical History:   Diagnosis Date   • Anxiety    • Arthritis     oa cassandra knees   • Asthma     good control- no medications   • Yan's esophagus    • Bipolar affective disorder ([address])    • Cannabis abuse with unspecified 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[address])    • Chronic pain disorder     lumbar   • COPD (chronic obstructive pulmonary disease) (HCC)    • CPAP (continuous positive airway pressure) dependence    • Degenerative disc disease at L5-S1 level    • Deliberate self-cutting     9/15/22per pt has not done recently-- does see a therapist and psychiatrist regularly   • Depression 09/16/2008   • Disease of thyroid gland     hypo   • MARTINEZ (dyspnea on exertion)     per pt "has had this with exertion and not new"   • Drug overdose 10/28/2008    suicide attempt and again drug overdose 7/2022-- [facility] floor and than transferred to [facility]   • Dysphagia    • Dyspnea    • Edema     BLE   • Family history of blood clots    • GERD (gastroesophageal reflux disease)    • Headache(784.0)    • Headache, tension-type    • High blood pressure    • History of COVID-19 12/30/2020    Symptoms started on 12/30/2020 and then got worse. Today she is feeling a little bit better. She is a candidate for monoclonal antibody infusion and set for 1/6/21 @ 1pm at [facility]. 01/07/21 - telemedicine -    • Hyperlipidemia    • Hypertension    • Knee pain, bilateral     Especially right   • Medical marijuana use    • Memory loss    • Migraines    • Obesity    • Overactive bladder    • Sjogren's disease ([address])    • Sleep apnea    • Stress incontinence    • Suicidal ideations    • Teeth missing    • Tremor     per pt Tremors of Right Leg and both Arms   • Visual impairment    • Wears glasses        Past Surgical History:   Procedure Laterality Date   • BREAST CYST EXCISION Right 1989   • CARPAL TUNNEL RELEASE Left    • CHOLECYSTECTOMY  05/2003    Laparoscopic   • COLONOSCOPY      01/12/2009   • DILATION AND CURETTAGE OF UTERUS     • ELBOW SURGERY Left     x2.  No hardware   • ESOPHAGOGASTRODUODENOSCOPY     • FOOT SURGERY Left     Plantar fasciotomy   • HERNIA REPAIR     • HYSTERECTOMY      laporoscopic, partial   • KNEE ARTHROSCOPY Bilateral    • OOPHORECTOMY Left 10/2015   • CT GASTROCNEMIUS RECESSION Left 02/24/2021    Procedure: RECESSION GASTROC OPEN;  Surgeon: Isac Angelucci, DPM;  Location: [facility] MAIN OR;  Service: Podiatry   • CT RPR UMBILICAL HRNA 5 YRS/> REDUCIBLE N/A 04/24/2019    Procedure: REPAIR HERNIA UMBILICAL LAPAROSCOPIC W/ ROBOTICS;  Surgeon: Sari Hui MD;  Location: [facility] Main OR;  Service: General   • CT SPHINCTEROTOMY ANAL DIVISION SPHINCTER [facility] N/A 08/31/2018    Procedure: EUA, LEFT PARTIAL INTERNAL SPHINCTEROTOMY;  Surgeon: Malissa Frye MD;  Location: [address] [place] [address] MAIN OR;  Service: Colorectal   • CT TNOT ELBOW LATERAL/MEDIAL DEBRIDE OPEN TDN RPR Left 09/20/2022    Procedure: RELEASE EPICONDYLAR ELBOW MEDIAL;  Surgeon: Betty Bedolla MD;  Location: [facility] MAIN OR;  Service: Orthopedics   • REDUCTION MAMMAPLASTY Bilateral 1999   • REPAIR LACERATION Left     left hand-5/18/2009   • REPLACEMENT TOTAL KNEE Right    • ROTATOR CUFF REPAIR Left    • TONSILECTOMY AND ADNOIDECTOMY     • US GUIDED MSK PROCEDURE  04/22/2021   • VASCULAR SURGERY     • VEIN LIGATION Bilateral    • WISDOM TOOTH EXTRACTION         Meds/Allergies:  Prior to Admission medications    Medication Sig Start Date End Date Taking? Authorizing Provider   albuterol (Ventolin HFA) 90 mcg/act inhaler Inhale 2 puffs every 6 (six) hours as needed for wheezing 6/20/22  Yes JHONATAN Nava   amLODIPine (NORVASC) 5 mg tablet TAKE 1 TABLET (5 MG TOTAL) BY MOUTH DAILY 7/12/23  Yes JHONATAN Osorio   apixaban (ELIQUIS) 5 mg Take 2 tablets (10 mg total) by mouth 2 (two) times a day for 7 days, THEN 1 tablet (5 mg total) 2 (two) times a day for 23 days, THEN 1 tablet (5 mg total) 2 (two) times a day. 9/3/23 3/1/24 Yes Chioma Crandall MD   buPROPion (WELLBUTRIN XL) 300 mg 24 hr tablet Take 300 mg by mouth daily 9/16/22  Yes Historical Provider, MD   cephalexin (KEFLEX) 500 mg capsule Take 500 mg by mouth 4 (four) times a day 8/31/23 9/7/23 Yes Historical Provider, MD   Cholecalciferol (Vitamin D3) 125 MCG (5000 UT) CAPS Take by mouth in the morning 5/25/22  Yes Historical Provider, MD   clobetasol (TEMOVATE) 0.05 % ointment Apply to affected areas sparingly. Do not exceed 2 weeks of treatment.  9/6/23  Yes JHONATAN Osorio   Diclofenac Sodium (VOLTAREN) 1 % Apply 2 g topically 4 (four) times a day 7/17/23  Yes JHONATAN Osorio   ergocalciferol (ERGOCALCIFEROL) 1.25 MG (52084 UT) capsule    Yes Historical Provider, MD estradiol (ESTRACE) 0.5 MG tablet Take 1 tablet (0.5 mg total) by mouth daily 5/4/23  Yes Luz Myles,    furosemide (LASIX) 20 mg tablet Take 1 tablet (20 mg total) by mouth daily 9/6/23  Yes JHONATAN Ecsobar   haloperidol decanoate (Haldol Decanoate) 50 mg/mL injection every 30 (thirty) days 8/3/22  Yes Historical Provider, MD   hydrocortisone 2.5 % ointment Apply topically 2 (two) times a day 10/25/22  Yes JHONATAN Ugarte   levothyroxine (Euthyrox) 125 mcg tablet Take 1 tablet (125 mcg total) by mouth daily in the early morning 3/16/23  Yes JHONATAN Ugarte   losartan (COZAAR) 50 mg tablet TAKE ONE TABLET BY MOUTH DAILY 3/29/23  Yes JHONATAN Ugarte   metoprolol tartrate (LOPRESSOR) 25 mg tablet TAKE ONE TABLET BY MOUTH EVERY 12 HOURS 6/21/23  Yes JHONATAN Escobar   ondansetron (ZOFRAN-ODT) 4 mg disintegrating tablet Take 1 tablet (4 mg total) by mouth every 6 (six) hours as needed for nausea or vomiting 8/18/23  Yes JHONATAN Escobar   RABEprazole (ACIPHEX) 20 MG tablet Take 20 mg by mouth 2 (two) times a day   Yes Historical Provider, MD   rOPINIRole (REQUIP) 1 mg tablet  9/26/22  Yes Historical Provider, MD   trospium chloride (SANCTURA) 20 mg tablet Take 20 mg by mouth 2 (two) times a day 1/12/23 1/12/24 Yes Historical Provider, MD   ammonium lactate (LAC-HYDRIN) 12 % cream  8/2/23   Historical Provider, MD   buPROPion (WELLBUTRIN XL) 150 mg 24 hr tablet Take 1 tablet (150 mg total) by mouth daily 7/26/22 9/2/23  Edmar Montalvo DO   carBAMazepine (CARBATROL) 300 MG 12 hr capsule Take 1 capsule by mouth in the morning 11/23/22   Historical Provider, MD   colestipol (COLESTID) 1 g tablet Take 1 tablet (1 g total) by mouth 2 (two) times a day 7/16/22 9/2/23  [place] Cristal, MD   cyproheptadine (PERIACTIN) 4 mg tablet     Historical Provider, MD   Diclofenac Sodium (VOLTAREN) 1 % Apply 2 g topically 3 (three) times a day as needed (pain) For pain to affected area  Patient not taking: Reported on 9/7/2023 5/5/23   Zoila Urbina PA-C   divalproex sodium (DEPAKOTE) 250 mg EC tablet Take 3 tablets (750 mg total) by mouth every 12 (twelve) hours 7/25/22 12/15/22  Neita Blizzard, DO   Fesoterodine Fumarate ER (Toviaz) 4 MG TB24 Take 1 tablet by mouth daily    Historical Provider, MD   fluticasone (FLOVENT HFA) 110 MCG/ACT inhaler Inhale 2 puffs as needed Rinse mouth after use. Historical Provider, MD   Lurasidone HCl 60 MG TABS Take 1 tablet (60 mg total) by mouth 2 (two) times a day TAKEN IN THE MORNING AND AT NIGHT-----[place] 1/26/23 9/2/23  JHONATAN Yanes   memantine [facility]) 10 mg tablet     Historical Provider, MD   pilocarpine (SALAGEN) 5 mg tablet Take 5 mg by mouth 2 (two) times a day    Historical Provider, MD   topiramate (TOPAMAX) 100 mg tablet Take 1.5 tablets (150 mg total) by mouth 2 (two) times a day 1/26/23 2/25/23  JHONATAN Yanes   venlafaxine [facility]) 37.5 mg tablet Take 37.5 mg by mouth daily 10/19/22   Historical Provider, MD TAFOYA have reviewed home medications using recent Epic encounter. Allergies: Allergies   Allergen Reactions   • Percocet [Oxycodone-Acetaminophen] Headache     Severe headaches   (denies issues with Tylenol)   • Povidone Iodine Rash     Unsure if betadine or gauze post surgical. Got rash on leg.    Has  Had itchy rashes after every surgery prep and IV insertion   • Chlorhexidine Rash     Per pt "able to use the liquid soap--thinkd reaction from the sponges or wipes"       Social History:  Marital Status: Single   Occupation:  Patient Pre-hospital Living Situation: Home, Alone  Patient Pre-hospital Level of Mobility: walks  Patient Pre-hospital Diet Restrictions: none  Substance Use History:   Social History     Substance and Sexual Activity   Alcohol Use Not Currently    Comment: Recovering alcoholic     Social History     Tobacco Use   Smoking Status Former   • Packs/day: 2.00   • Years: 30.00   • Total pack years: 60.00   • Types: Cigarettes   • Start date: 5   • Quit date: 2018   • Years since quittin.6   Smokeless Tobacco Never   Tobacco Comments    Started at age 14. Social History     Substance and Sexual Activity   Drug Use Yes   • Types: Marijuana, Methamphetamines    Comment: Medical marijuana       Family History:  Family History   Problem Relation Age of Onset   • Kidney cancer Mother    • Diabetes Mother    • Depression Mother    • Stroke Mother    • Arthritis Mother    • Cancer Mother    • Psychiatric Illness Mother    • No Known Problems Father    • No Known Problems Sister    • No Known Problems Maternal Grandmother    • No Known Problems Maternal Grandfather    • No Known Problems Paternal Grandmother    • No Known Problems Paternal Grandfather    • Colon cancer Maternal Uncle    • Colon cancer Maternal Uncle    • Colon cancer Paternal Uncle    • Colon cancer Family    • Alcohol abuse Sister    • Asthma Sister        Physical Exam:     Vitals:   Blood Pressure: 125/69 (23 034)  Pulse: 59 (23 034)  Temperature: 97.5 °F (36.4 °C) (23 0348)  Temp Source: Oral (23 034)  Respirations: 20 (23 0230)  SpO2: 95 % (23)    Physical Exam  Vitals reviewed. Constitutional:       General: She is sleeping. She is not in acute distress. Appearance: She is morbidly obese. She is not toxic-appearing. HENT:      Head: Normocephalic and atraumatic. Right Ear: External ear normal.      Left Ear: External ear normal.      Mouth/Throat:      Mouth: Mucous membranes are moist.      Pharynx: Oropharynx is clear. Eyes:      Extraocular Movements: Extraocular movements intact. Conjunctiva/sclera: Conjunctivae normal.   Cardiovascular:      Rate and Rhythm: Normal rate and regular rhythm. Pulses: Normal pulses. Pulmonary:      Effort: Pulmonary effort is normal. No respiratory distress. Breath sounds: Normal breath sounds. No wheezing, rhonchi or rales.    Abdominal: Palpations: Abdomen is soft. Tenderness: There is no abdominal tenderness. There is no guarding or rebound. Musculoskeletal:      Right lower le+ Edema present. Left lower le+ Edema present. Skin:     General: Skin is warm and dry. Neurological:      Mental Status: She is oriented to person, place, and time and easily aroused. Psychiatric:         Mood and Affect: Mood is anxious and depressed. Affect is blunt. Behavior: Behavior is withdrawn. Additional Data:     Lab Results:  Results from last 7 days   Lab Units 23  2335   WBC Thousand/uL 9.85   HEMOGLOBIN g/dL 14.6   HEMATOCRIT % 45.8   PLATELETS Thousands/uL 152   NEUTROS PCT % 61   LYMPHS PCT % 27   MONOS PCT % 9   EOS PCT % 1     Results from last 7 days   Lab Units 23  2335   SODIUM mmol/L 143   POTASSIUM mmol/L 3.9   CHLORIDE mmol/L 111*   CO2 mmol/L 25   BUN mg/dL 12   CREATININE mg/dL 0.73   ANION GAP mmol/L 7   CALCIUM mg/dL 8.6   ALBUMIN g/dL 3.7   TOTAL BILIRUBIN mg/dL 0.20   ALK PHOS U/L 56   ALT U/L 35   AST U/L 25   GLUCOSE RANDOM mg/dL 105     Results from last 7 days   Lab Units 23  2335   INR  1.02     Results from last 7 days   Lab Units 23  2334   POC GLUCOSE mg/dl 107               Lines/Drains:  Invasive Devices     Peripheral Intravenous Line  Duration           Peripheral IV 23 Distal;Right;Upper;Ventral (anterior) Arm <1 day    Peripheral IV 23 Left Antecubital <1 day                    Imaging: Reviewed radiology reports from this admission including: chest CT scan  CTA ED chest PE study   Final Result by Trish Wilson MD (136)      Persistent saddle pulmonary embolism extending into the bilateral lower lobar to segmental branches, clot burden is not significantly changed since prior exam.      The calculated ratio of right ventricular to left ventricular diameter (RV/LV ratio) is 1.1. There is a critical finding of acute PE with RV/LV ratio >0.9. Based on PERT algorithm recommendations:     Order STAT biomarkers including troponin, NT-BNP or BNP     Calculate PESI score:   o  If PESI score falls in I-III, with negative biomarkers, please order a PERT priority ECHO. o  If PESI score falls in IV-V or with positive biomarkers, please order STAT ECHO and alert the [place] PERT via [facility] at [phone]. The study was marked in [facility] for immediate notification. Workstation performed: [id number]         XR chest 1 view portable   ED Interpretation by Jasbir Christensen DO (09/07 6622)   No acute cardiopulmonary process visualized. EKG and Other Studies Reviewed on Admission:   · EKG: NSR. HR 73.    ** Please Note: This note has been constructed using a voice recognition system.  **

## 2023-09-07 NOTE — CASE MANAGEMENT
Case Management Assessment    Patient name Amy Euceda  Location S /S -84 MRN [id number]  : 1970 Date 2023       Current Admission Date: 2023  Current Admission Diagnosis:Acute respiratory failure [facility])   Patient Active Problem List    Diagnosis Date Noted   • Acute saddle pulmonary embolism ([address]) 2023   • Elevated troponin 2023   • Acute respiratory failure ([address]) 2023   • Cervicalgia 08/10/2023   • Medial epicondylitis of right elbow 2023   • Bilateral leg edema 2023   • Chronic migraine without aura without status migrainosus, not intractable 2023   • Chronic eczematous otitis externa of both ears 2023   • Intentional self-harm by unspecified sharp object, sequela ([address]) 2023   • Suicidal deliberate poisoning ([address]) 2022   • Knee pain, right 2022   • Platelets decreased ([address]) 2022   • GERD (gastroesophageal reflux disease) 2022   • Diarrhea 2022   • Ecchymosis 2022   • Anxiety 2022   • Borderline personality disorder ([address]) 2022   • Contusion of elbow 2021   • Cognitive impairment 10/07/2021   • Substance abuse ([address]) 2021   • Potential for cognitive impairment 2021   • Mood disorder ([address]) 2021   • Chronic tension-type headache, intractable 03/10/2021   • Morbid obesity with BMI of 40.0-44.9, adult ([address]) 2021   • History of COVID-19 2020   • Memory difficulties 2020   • BMI 45.0-49.9, adult ([address]) 2020   • Mixed hyperlipidemia 10/30/2019   • COPD (chronic obstructive pulmonary disease) ([address]) 2019   • Major depressive disorder 2019   • Sjogren's syndrome ([address]) 2019   • Primary osteoarthritis of both knees 2018   • MAG (obstructive sleep apnea) 2018   • Medial epicondylitis of elbow, left 2018   • Hemorrhage of rectum and anus 10/02/2017   • Overactive bladder 2017   • Schizoaffective disorder, bipolar type ([address] [facility]) 03/17/2017   • Hypothyroidism 03/17/2017   • Restrictive lung disease 02/01/2017   • Family history of renal cancer 04/26/2016   • Microhematuria 12/23/2015   • Yan's esophagus determined by biopsy 10/19/2015   • Medical clearance for psychiatric admission 09/14/2015   • DDD (degenerative disc disease), lumbar 04/09/2015   • Spondylosis of lumbosacral joint without myelopathy 04/09/2015   • Urinary incontinence 03/31/2015   • Benign essential hypertension 07/02/2014   • Edema 03/26/2014   • Chronic low back pain 04/07/2012   • Hypertension 10/25/2008      LOS (days): 0  Geometric Mean LOS (GMLOS) (days):   Days to GMLOS:     OBJECTIVE:  PATIENT READMITTED TO HOSPITAL  Risk of Unplanned Readmission Score: 28.99         Current admission status: Inpatient       Preferred Pharmacy:   [address] [facility], [address] [facility]  [address] [facility] [address]  Phone: [phone] Fax: [phone] [address]  (Deneice Tracy) [place] [address]  Phone: [phone] Fax: [phone]    Primary Care Provider: JHONATAN Michael    Primary Insurance: MEDICARE  Secondary Insurance: [address] [place] [address]    ASSESSMENT:  Active Health Care Proxies    There are no active Health Care Proxies on file. Patient Information  Admitted from[de-identified] Home  Mental Status: Alert  During Assessment patient was accompanied by: Not accompanied during assessment  Assessment information provided by[de-identified] Patient  Primary Caregiver: Self  Support Systems: Family members  [place] of Residence: [address] do you live in?: [address] entry access options.  Select all that apply.: No steps to enter home, Elevator  Type of Current Residence: Apartment  Floor Level: 4  Upon entering residence, is there a bedroom on the main floor (no further steps)?: Yes  Upon entering residence, is there a bathroom on the main floor (no further steps)?: Yes  In the last 12 months, was there a time when you were not able to pay the mortgage or rent on time?: No  In the last 12 months, was there a time when you did not have a steady place to sleep or slept in a shelter (including now)?: No  Homeless/housing insecurity resource given?: N/A  Living Arrangements: Lives Alone    Activities of Daily Living Prior to Admission  Functional Status: Independent  Completes ADLs independently?: Yes  Ambulates independently?: Yes  Does patient use assisted devices?: No  Does patient currently own DME?: Yes  What DME does the patient currently own?: [employer]  Does patient have a history of Outpatient Therapy (PT/OT)?: Yes  Does the patient have a history of Short-Term Rehab?: No  Does patient have a history of HHC?: Yes ([facility])  Does patient currently have [address]?: No         Patient Information Continued  Within the past 12 months, you worried that your food would run out before you got the money to buy more.: Never true  Within the past 12 months, the food you bought just didn't last and you didn't have money to get more.: Never true  Food insecurity resource given?: N/A  Does patient have a history of substance abuse?: Yes  Historical substance use preference: [address] of Transport to Appts[de-identified] Drives Self  In the past 12 months, has lack of transportation kept you from medical appointments or from getting medications?: No  In the past 12 months, has lack of transportation kept you from meetings, work, or from getting things needed for daily living?: No  Was application for public transport provided?: N/A    CM met with patient at bedside re: assessment and consult received due to Meth use. Patient lives alone in 4th floor apartment, elevator access. Pt independent with ADLS, does not use any dme at baseline, has hx of OPPT and HHC with [facility]. Pt reports drives self to appointments. Confirmed PCP Dennise Witt) and preferred pharmacy [facility]). Patient reported daily use of meth but has not used in the last 6 days. Pt relayed she is quitting on her own, with [facility] as her supports. Patient aware CATCH to see her during current admission for information re: rehab info and resources.

## 2023-09-07 NOTE — ED PROVIDER NOTES
History  Chief Complaint   Patient presents with   • Shortness of Breath     Pt presents with SOB since last week when she was diagnosed with a large clot in one lung. Pt states her breathing got worse today and was not relived with her BiPAP. +CP/+SOB     48 yo F history of schizoaffective disorder, recent admission for PE, presents to the emergency department with chest pain, shortness of breath. Patient states has been ongoing however worsened today. Patient was discharged home from recent admission for PE with 5 mg Eliquis. Patient patient been taking as prescribed. Patient denies fevers, chills, Vern pain, nausea, vomiting. Patient notes that she has used meth in the past however has not used in the last 5 days. Denies any other drug use. Prior to Admission Medications   Prescriptions Last Dose Informant Patient Reported? Taking?    Cholecalciferol (Vitamin D3) 125 MCG (5000 UT) CAPS 9/6/2023 Self Yes Yes   Sig: Take by mouth in the morning   Diclofenac Sodium (VOLTAREN) 1 % Not Taking  No No   Sig: Apply 2 g topically 3 (three) times a day as needed (pain) For pain to affected area   Patient not taking: Reported on 9/7/2023   Diclofenac Sodium (VOLTAREN) 1 % 9/6/2023  No Yes   Sig: Apply 2 g topically 4 (four) times a day   Fesoterodine Fumarate ER (Toviaz) 4 MG TB24  Self Yes No   Sig: Take 1 tablet by mouth daily   Lurasidone HCl 60 MG TABS   No No   Sig: Take 1 tablet (60 mg total) by mouth 2 (two) times a day TAKEN IN THE MORNING AND AT NIGHT-----Latuda   RABEprazole (ACIPHEX) 20 MG tablet 9/6/2023 Self Yes Yes   Sig: Take 20 mg by mouth 2 (two) times a day   albuterol (Ventolin HFA) 90 mcg/act inhaler 9/6/2023 Self No Yes   Sig: Inhale 2 puffs every 6 (six) hours as needed for wheezing   amLODIPine (NORVASC) 5 mg tablet 9/6/2023  No Yes   Sig: TAKE 1 TABLET (5 MG TOTAL) BY MOUTH DAILY   ammonium lactate (LAC-HYDRIN) 12 % cream   Yes No   apixaban (ELIQUIS) 5 mg 9/6/2023  No Yes   Sig: Take 2 tablets (10 mg total) by mouth 2 (two) times a day for 7 days, THEN 1 tablet (5 mg total) 2 (two) times a day for 23 days, THEN 1 tablet (5 mg total) 2 (two) times a day. buPROPion (WELLBUTRIN XL) 150 mg 24 hr tablet  Self No No   Sig: Take 1 tablet (150 mg total) by mouth daily   buPROPion (WELLBUTRIN XL) 300 mg 24 hr tablet 9/6/2023 Self Yes Yes   Sig: Take 300 mg by mouth daily   carBAMazepine (CARBATROL) 300 MG 12 hr capsule Unknown Self Yes No   Sig: Take 1 capsule by mouth in the morning   cephalexin (KEFLEX) 500 mg capsule 9/6/2023  Yes Yes   Sig: Take 500 mg by mouth 4 (four) times a day   clobetasol (TEMOVATE) 0.05 % ointment 9/6/2023  No Yes   Sig: Apply to affected areas sparingly. Do not exceed 2 weeks of treatment. colestipol (COLESTID) 1 g tablet  Self No No   Sig: Take 1 tablet (1 g total) by mouth 2 (two) times a day   cyproheptadine (PERIACTIN) 4 mg tablet   Yes No   divalproex sodium (DEPAKOTE) 250 mg EC tablet  Self No No   Sig: Take 3 tablets (750 mg total) by mouth every 12 (twelve) hours   ergocalciferol (ERGOCALCIFEROL) 1.25 MG (46067 UT) capsule 9/6/2023  Yes Yes   estradiol (ESTRACE) 0.5 MG tablet 9/6/2023  No Yes   Sig: Take 1 tablet (0.5 mg total) by mouth daily   fluticasone (FLOVENT HFA) 110 MCG/ACT inhaler  Self Yes No   Sig: Inhale 2 puffs as needed Rinse mouth after use.    furosemide (LASIX) 20 mg tablet 9/6/2023  No Yes   Sig: Take 1 tablet (20 mg total) by mouth daily   haloperidol decanoate (Haldol Decanoate) 50 mg/mL injection Past Month Self Yes Yes   Sig: every 30 (thirty) days   hydrocortisone 2.5 % ointment 9/6/2023 Self No Yes   Sig: Apply topically 2 (two) times a day   levothyroxine (Euthyrox) 125 mcg tablet 9/6/2023 Self No Yes   Sig: Take 1 tablet (125 mcg total) by mouth daily in the early morning   losartan (COZAAR) 50 mg tablet 9/6/2023  No Yes   Sig: TAKE ONE TABLET BY MOUTH DAILY   memantine (NAMENDA) 10 mg tablet Unknown  Yes No   metoprolol tartrate (LOPRESSOR) 25 mg tablet 9/6/2023  No Yes   Sig: TAKE ONE TABLET BY MOUTH EVERY 12 HOURS   ondansetron (ZOFRAN-ODT) 4 mg disintegrating tablet Past Week  No Yes   Sig: Take 1 tablet (4 mg total) by mouth every 6 (six) hours as needed for nausea or vomiting   pilocarpine (SALAGEN) 5 mg tablet  Self Yes No   Sig: Take 5 mg by mouth 2 (two) times a day   rOPINIRole (REQUIP) 1 mg tablet 9/6/2023 Self Yes Yes   topiramate (TOPAMAX) 100 mg tablet   No No   Sig: Take 1.5 tablets (150 mg total) by mouth 2 (two) times a day   trospium chloride (SANCTURA) 20 mg tablet 9/6/2023 Self Yes Yes   Sig: Take 20 mg by mouth 2 (two) times a day   venlafaxine (EFFEXOR) 37.5 mg tablet Unknown Self Yes No   Sig: Take 37.5 mg by mouth daily      Facility-Administered Medications: None       Past Medical History:   Diagnosis Date   • Anxiety    • Arthritis     oa cassandra knees   • Asthma     good control- no medications   • Yan's esophagus    • Bipolar affective disorder ([address])    • Cannabis abuse with unspecified 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HCC)    • Chronic pain disorder     lumbar   • COPD (chronic obstructive pulmonary disease) (HCC)    • CPAP (continuous positive airway pressure) dependence    • Degenerative disc disease at L5-S1 level    • Deliberate self-cutting     9/15/22per pt has not done recently-- does see a therapist and psychiatrist regularly   • Depression 09/16/2008   • Disease of thyroid gland     hypo   • MARTINEZ (dyspnea on exertion)     per pt "has had this with exertion and not new"   • Drug overdose 10/28/2008    suicide attempt and again drug overdose 7/2022-- [facility] floor and than transferred to [facility]   • Dysphagia    • Dyspnea    • Edema     BLE   • Family history of blood clots    • GERD (gastroesophageal reflux disease)    • Headache(784.0)    • Headache, tension-type    • High blood pressure    • History of COVID-19 12/30/2020    Symptoms started on 12/30/2020 and then got worse.   Today she is feeling a little bit better. She is a candidate for monoclonal antibody infusion and set for 1/6/21 @ 1pm at [facility]. 01/07/21 - telemedicine -    • Hyperlipidemia    • Hypertension    • Knee pain, bilateral     Especially right   • Medical marijuana use    • Memory loss    • Migraines    • Obesity    • Overactive bladder    • Sjogren's disease ([address])    • Sleep apnea    • Stress incontinence    • Suicidal ideations    • Teeth missing    • Tremor     per pt Tremors of Right Leg and both Arms   • Visual impairment    • Wears glasses        Past Surgical History:   Procedure Laterality Date   • BREAST CYST EXCISION Right 1989   • CARPAL TUNNEL RELEASE Left    • CHOLECYSTECTOMY  05/2003    Laparoscopic   • COLONOSCOPY      01/12/2009   • DILATION AND CURETTAGE OF UTERUS     • ELBOW SURGERY Left     x2.  No hardware   • ESOPHAGOGASTRODUODENOSCOPY     • FOOT SURGERY Left     Plantar fasciotomy   • HERNIA REPAIR     • HYSTERECTOMY      laporoscopic, partial   • KNEE ARTHROSCOPY Bilateral    • OOPHORECTOMY Left 10/2015   • NY GASTROCNEMIUS RECESSION Left 02/24/2021    Procedure: RECESSION GASTROC OPEN;  Surgeon: Dottie Pierre DPM;  Location: [address] [place] [address] MAIN OR;  Service: Podiatry   • NY RPR UMBILICAL HRNA 5 YRS/> REDUCIBLE N/A 04/24/2019    Procedure: REPAIR HERNIA UMBILICAL LAPAROSCOPIC W/ ROBOTICS;  Surgeon: Nancey Prader, MD;  Location: [facility] Main OR;  Service: General   • NY SPHINCTEROTOMY ANAL DIVISION SPHINCTER [facility] N/A 08/31/2018    Procedure: EUA, LEFT PARTIAL INTERNAL SPHINCTEROTOMY;  Surgeon: Milad Ayers MD;  Location: [address] [place] [address] MAIN OR;  Service: Colorectal   • NY TNOT ELBOW LATERAL/MEDIAL DEBRIDE OPEN TDN RPR Left 09/20/2022    Procedure: RELEASE EPICONDYLAR ELBOW MEDIAL;  Surgeon: Vilma Adan MD;  Location: [facility] MAIN OR;  Service: Orthopedics   • REDUCTION MAMMAPLASTY Bilateral 1999   • REPAIR LACERATION Left     left hand-5/18/2009   • REPLACEMENT TOTAL KNEE Right    • ROTATOR CUFF REPAIR Left    • TONSILECTOMY AND ADNOIDECTOMY     • US GUIDED MSK PROCEDURE  2021   • VASCULAR SURGERY     • VEIN LIGATION Bilateral    • WISDOM TOOTH EXTRACTION         Family History   Problem Relation Age of Onset   • Kidney cancer Mother    • Diabetes Mother    • Depression Mother    • Stroke Mother    • Arthritis Mother    • Cancer Mother    • Psychiatric Illness Mother    • No Known Problems Father    • No Known Problems Sister    • No Known Problems Maternal Grandmother    • No Known Problems Maternal Grandfather    • No Known Problems Paternal Grandmother    • No Known Problems Paternal Grandfather    • Colon cancer Maternal Uncle    • Colon cancer Maternal Uncle    • Colon cancer Paternal Uncle    • Colon cancer Family    • Alcohol abuse Sister    • Asthma Sister      I have reviewed and agree with the history as documented. E-Cigarette/Vaping   • E-Cigarette Use Current Some Day User    • Comments medical THC      E-Cigarette/Vaping Substances   • Nicotine No    • THC Yes    • CBD No    • Flavoring No    • Other No    • Unknown No      Social History     Tobacco Use   • Smoking status: Former     Packs/day: 2.00     Years: 30.00     Total pack years: 60.00     Types: Cigarettes     Start date: 5     Quit date: 2018     Years since quittin.6   • Smokeless tobacco: Never   • Tobacco comments:     Started at age 14. Vaping Use   • Vaping Use: Some days   • Substances: THC   Substance Use Topics   • Alcohol use: Not Currently     Comment: Recovering alcoholic   • Drug use: Yes     Types: Marijuana, Methamphetamines     Comment: Medical marijuana        Review of Systems   Constitutional: Negative for chills and fever. HENT: Negative for ear pain and sore throat. Eyes: Negative for pain and visual disturbance. Respiratory: Positive for shortness of breath. Negative for cough. Cardiovascular: Positive for chest pain and leg swelling. Negative for palpitations.    Gastrointestinal: Negative for abdominal pain, constipation, diarrhea, nausea and vomiting. Genitourinary: Negative for dysuria and hematuria. Musculoskeletal: Negative for arthralgias and back pain. Skin: Negative for color change and rash. Neurological: Negative for seizures and syncope. All other systems reviewed and are negative. Physical Exam  ED Triage Vitals [23 2248]   Temperature Pulse Respirations Blood Pressure SpO2   98.3 °F (36.8 °C) 74 22 125/70 97 %      Temp Source Heart Rate Source Patient Position - Orthostatic VS BP Location FiO2 (%)   Oral Monitor Sitting Right arm --      Pain Score       5             Orthostatic Vital Signs  Vitals:    23 0115 23 0130 23 0230 23 0348   BP: 106/60 110/59 127/68 125/69   Pulse: 62 61 58 59   Patient Position - Orthostatic VS:    Lying       Physical Exam  Vitals and nursing note reviewed. Constitutional:       General: She is awake. She is not in acute distress. Appearance: She is well-developed. She is ill-appearing (Chronically ill). Interventions: Nasal cannula in place. Comments: Anxious appearing   HENT:      Head: Normocephalic and atraumatic. Eyes:      Conjunctiva/sclera: Conjunctivae normal.   Cardiovascular:      Rate and Rhythm: Normal rate and regular rhythm. Pulses: Normal pulses. Heart sounds: No murmur heard. Pulmonary:      Effort: Pulmonary effort is normal. No respiratory distress. Breath sounds: Normal breath sounds. No wheezing. Abdominal:      Palpations: Abdomen is soft. Tenderness: There is no abdominal tenderness. Musculoskeletal:         General: No swelling. Cervical back: Neck supple. Right lower le+ Pitting Edema present. Left lower le+ Pitting Edema present. Skin:     General: Skin is warm and dry. Capillary Refill: Capillary refill takes less than 2 seconds. Neurological:      General: No focal deficit present. Mental Status: She is alert.    Psychiatric: Mood and Affect: Affect is blunt. Behavior: Behavior is withdrawn.          ED Medications  Medications   heparin (porcine) 25,000 units in 0.45% NaCl 250 mL infusion (premix) (18 Units/kg/hr × 125 kg (Order-Specific) Intravenous New Bag 9/7/23 0147)   heparin (porcine) injection 10,000 Units (has no administration in time range)   heparin (porcine) injection 5,000 Units (has no administration in time range)   LORazepam (ATIVAN) injection 0.5 mg ( Intravenous Not Given 9/7/23 0346)   iohexol (OMNIPAQUE) 350 MG/ML injection (MULTI-DOSE) 85 mL (85 mL Intravenous Given 9/7/23 0032)   heparin (porcine) injection 10,000 Units (10,000 Units Intravenous Given 9/7/23 0142)       Diagnostic Studies  Results Reviewed     Procedure Component Value Units Date/Time    APTT [[id number]]     Lab Status: No result Specimen: Blood     Rapid drug screen, urine [[id number]] Collected: 09/07/23 0401    Lab Status: No result Specimen: Urine, Other     UA w Reflex to Microscopic w Reflex to Culture [[id number]] Collected: 09/07/23 0401    Lab Status: No result Specimen: Urine, Other     HS Troponin I 2hr [[id number]]  (Normal) Collected: 09/07/23 0153    Lab Status: Final result Specimen: Blood from Arm, Left Updated: 09/07/23 0229     hs TnI 2hr 3 ng/L      Delta 2hr hsTnI 0 ng/L     B-Type Natriuretic Peptide(BNP) [[id number]]  (Normal) Collected: 09/06/23 2335    Lab Status: Final result Specimen: Blood from Arm, Left Updated: 09/07/23 0155     BNP 25 pg/mL     hCG, qualitative pregnancy [[id number]]  (Normal) Collected: 09/06/23 2335    Lab Status: Final result Specimen: Blood from Arm, Left Updated: 09/07/23 0118     Preg, Serum Negative    FLU/RSV/COVID - if FLU/RSV clinically relevant [[id number]]  (Normal) Collected: 09/06/23 2335    Lab Status: Final result Specimen: Nares from Nose Updated: 09/07/23 0029     SARS-CoV-2 Negative     INFLUENZA A PCR Negative     INFLUENZA B PCR Negative     RSV PCR Negative    Narrative: FOR PEDIATRIC PATIENTS - copy/paste COVID Guidelines URL to browser: [email or web address]    SARS-CoV-2 assay is a Nucleic Acid Amplification assay intended for the  qualitative detection of nucleic acid from SARS-CoV-2 in nasopharyngeal  swabs. Results are for the presumptive identification of SARS-CoV-2 RNA. Positive results are indicative of infection with SARS-CoV-2, the virus  causing COVID-19, but do not rule out bacterial infection or co-infection  with other viruses. Laboratories within the [facility] and its  territories are required to report all positive results to the appropriate  public health authorities. Negative results do not preclude SARS-CoV-2  infection and should not be used as the sole basis for treatment or other  patient management decisions. Negative results must be combined with  clinical observations, patient history, and epidemiological information. This test has not been FDA cleared or approved. This test has been authorized by FDA under an Emergency Use Authorization  (EUA). This test is only authorized for the duration of time the  declaration that circumstances exist justifying the authorization of the  emergency use of an in vitro diagnostic tests for detection of SARS-CoV-2  virus and/or diagnosis of COVID-19 infection under section 564(b)(1) of  the Act, 21 U. S.C. 723ROP-2(H)(4), unless the authorization is terminated  or revoked sooner. The test has been validated but independent review by FDA  and CLIA is pending. Test performed using [employer] Gene[employer]pert: This RT-PCR assay targets N2,  a region unique to SARS-CoV-2. A conserved region in the E-gene was chosen  for pan-Sarbecovirus detection which includes SARS-CoV-2. According to CMS-2020-01-R, this platform meets the definition of high-throughput technology.     HS Troponin I 4hr [[id number]]     Lab Status: No result Specimen: Blood     HS Troponin 0hr (reflex protocol) [[id number]]  (Normal) Collected: 09/06/23 2335    Lab Status: Final result Specimen: Blood from Arm, Left Updated: 09/07/23 0010     hs TnI 0hr 3 ng/L     Comprehensive metabolic panel [[id number]]  (Abnormal) Collected: 09/06/23 2335    Lab Status: Final result Specimen: Blood from Arm, Left Updated: 09/07/23 0004     Sodium 143 mmol/L      Potassium 3.9 mmol/L      Chloride 111 mmol/L      CO2 25 mmol/L      ANION GAP 7 mmol/L      BUN 12 mg/dL      Creatinine 0.73 mg/dL      Glucose 105 mg/dL      Calcium 8.6 mg/dL      AST 25 U/L      ALT 35 U/L      Alkaline Phosphatase 56 U/L      Total Protein 6.1 g/dL      Albumin 3.7 g/dL      Total Bilirubin 0.20 mg/dL      eGFR 95 ml/min/1.73sq m     Narrative:      [facility] guidelines for Chronic Kidney Disease (CKD):   •  Stage 1 with normal or high GFR (GFR > 90 mL/min/1.73 square meters)  •  Stage 2 Mild CKD (GFR = 60-89 mL/min/1.73 square meters)  •  Stage 3A Moderate CKD (GFR = 45-59 mL/min/1.73 square meters)  •  Stage 3B Moderate CKD (GFR = 30-44 mL/min/1.73 square meters)  •  Stage 4 Severe CKD (GFR = 15-29 mL/min/1.73 square meters)  •  Stage 5 End Stage CKD (GFR <15 mL/min/1.73 square meters)  Note: GFR calculation is accurate only with a steady state creatinine    Magnesium [[id number]]  (Normal) Collected: 09/06/23 2335    Lab Status: Final result Specimen: Blood from Arm, Left Updated: 09/07/23 0004     Magnesium 2.2 mg/dL     Salicylate level [[id number]]  (Normal) Collected: 09/06/23 2335    Lab Status: Final result Specimen: Blood from Arm, Left Updated: 45/41/30 7228     Salicylate Lvl <5 mg/dL     Acetaminophen level-If concentration is detectable, please discuss with medical  on call.  [[id number]]  (Abnormal) Collected: 09/06/23 2335    Lab Status: Final result Specimen: Blood from Arm, Left Updated: 09/07/23 0004     Acetaminophen Level <10 ug/mL     Ethanol [[id number]]  (Normal) Collected: 09/06/23 2738 Lab Status: Final result Specimen: Blood from Arm, Left Updated: 09/07/23 0001     Ethanol Lvl <10 mg/dL     Protime-INR [[id number]]  (Normal) Collected: 09/06/23 2335    Lab Status: Final result Specimen: Blood from Arm, Left Updated: 09/06/23 2358     Protime 14.1 seconds      INR 1.02    APTT [[id number]]  (Normal) Collected: 09/06/23 2335    Lab Status: Final result Specimen: Blood from Arm, Left Updated: 09/06/23 2358     PTT 31 seconds     Blood gas, venous [[id number]]  (Abnormal) Collected: 09/06/23 2335    Lab Status: Final result Specimen: Blood from Arm, Left Updated: 09/06/23 2350     pH, Corbin 7.404     pCO2, [place]bin 35.9 mm Hg      pO2, [place] 130.2 mm Hg      HCO3, [place] 21.9 mmol/L      Base Excess, [place]bin -2.1 mmol/L      O2 Content, [place]bin 20.9 ml/dL      O2 HGB, VENOUS 95.3 %     CBC and differential [[id number]] Collected: 09/06/23 2335    Lab Status: Final result Specimen: Blood from Arm, Left Updated: 09/06/23 2347     WBC 9.85 Thousand/uL      RBC 4.77 Million/uL      Hemoglobin 14.6 g/dL      Hematocrit 45.8 %      MCV 96 fL      MCH 30.6 pg      MCHC 31.9 g/dL      RDW 13.4 %      MPV 11.1 fL      Platelets 762 Thousands/uL      nRBC 0 /100 WBCs      Neutrophils Relative 61 %      Immat GRANS % 2 %      Lymphocytes Relative 27 %      Monocytes Relative 9 %      Eosinophils Relative 1 %      Basophils Relative 0 %      Neutrophils Absolute 5.97 Thousands/µL      Immature Grans Absolute 0.16 Thousand/uL      Lymphocytes Absolute 2.69 Thousands/µL      Monocytes Absolute 0.90 Thousand/µL      Eosinophils Absolute 0.10 Thousand/µL      Basophils Absolute 0.03 Thousands/µL     Fingerstick Glucose (POCT) [[id number]]  (Normal) Collected: 09/06/23 2334    Lab Status: Final result Updated: 09/06/23 2338     POC Glucose 107 mg/dl                  CTA ED chest PE study   Final Result by Sneha Mcgrath MD (09/07 0136)      Persistent saddle pulmonary embolism extending into the bilateral lower lobar to segmental branches, clot burden is not significantly changed since prior exam.      The calculated ratio of right ventricular to left ventricular diameter (RV/LV ratio) is 1.1. There is a critical finding of acute PE with RV/LV ratio >0.9. Based on PERT algorithm recommendations:     Order STAT biomarkers including troponin, NT-BNP or BNP     Calculate PESI score:   o  If PESI score falls in I-III, with negative biomarkers, please order a PERT priority ECHO. o  If PESI score falls in IV-V or with positive biomarkers, please order STAT ECHO and alert the campus PERT via [address] at [phone]. The study was marked in [facility] for immediate notification. Workstation performed: [id number]         XR chest 1 view portable   ED Interpretation by Myra Webb DO (09/07 3693)   No acute cardiopulmonary process visualized. Procedures  ECG 12 Lead Documentation Only    Date/Time: 9/7/2023 4:04 AM    Performed by: Myra Webb DO  Authorized by: Myra Webb DO    Patient location:  ED  Previous ECG:     Previous ECG:  Compared to current    Comparison ECG info:  9/2/23    Similarity:  No change  Interpretation:     Interpretation: non-specific    Rate:     ECG rate:  73    ECG rate assessment: normal    Rhythm:     Rhythm: sinus rhythm    Ectopy:     Ectopy: none    QRS:     QRS axis:  Normal    QRS intervals:  Normal  Conduction:     Conduction: normal    ST segments:     ST segments:  Normal  T waves:     T waves: normal            ED Course  ED Course as of 09/07/23 0407   Wed Sep 06, 2023   2258 Blood Pressure: 125/70   2258 Temperature: 98.3 °F (36.8 °C)   2258 Pulse: 74   2258 Respirations: 22   2258 SpO2: 97 %   2258 46 yo F history of schizoaffective disorder, recent admission for PE, presents to the emergency department with chest pain, shortness of breath. Patient states has been ongoing however worsened today. Patient was discharged home from recent admission for PE with 5 mg Eliquis. Patient patient been taking as prescribed. Patient denies fevers, chills, Venr pain, nausea, vomiting. Patient notes that she has used meth in the past however has not used in the last 5 days. Denies any other drug use. Physical exam: Anxious appearing female, O2 on 3 L. Patient is not on oxygen at home. Heart is regular rate and rhythm, lungs are clear to auscultation bilaterally. Abdomen soft nontender. Ecchymosis to bilateral forearms. Asymmetric swelling to left lower extremity. Patient states he is aware DVTs. Concern for: Worsening PE versus ACS versus viral illness versus pneumonia   2339 POC Glucose: 107  When nursing staff came into the room to place an IV, patient had eyes open. Responding to questioning. Patient then closes her eyes and stop responding to questioning but her feet were shaking. Patient moved arm away from face with arm drop test.  After checking blood sugar, patient started responding to verbal questioning again. 2353 Blood gas, venous(!)  7.404/35.9/130.2/21.9   2353 CBC and differential  WNL   2359 PTT: 31   2359 PROTIME: 14.1   2359 POCT INR: 1.02   Thu Sep 07, 2023   0002 MEDICAL ALCOHOL: <10   0011 Comprehensive metabolic panel(!)  Slight increase in chloride. Otherwise normal electrolytes. Normal kidney values. 8142 SALICYLATE LEVEL: <5   0011 ACETAMINOPHEN LEVEL(!): <10   0011 hs TnI 0hr: 3  EKG shows normal sinus rhythm rate 73. No acute ST elevations or depressions. Normal axis, normal QRS complex, normal QTc. Will obtain 2-hour troponin. 0011 Magnesium: 2.2   0035 SARS-COV-2: Negative   0036 INFLU A PCR: Negative   0036 INFLU B PCR: Negative   0036 RSV PCR: Negative   0108 XR chest 1 view portable  No acute cardiopulmonary process visualized.    0134 PREGNANCY, SERUM: Negative   0140 CTA ED chest PE study  Persistent saddle pulmonary embolism extending into the bilateral lower lobar to segmental branches, clot burden is not significantly changed since prior exam.     The calculated ratio of right ventricular to left ventricular diameter (RV/LV ratio) is 1.1. There is a critical finding of acute PE with RV/LV ratio >0.9. Based on PERT algorithm recommendations:    Order STAT biomarkers including troponin, NT-BNP or BNP    Calculate PESI score:  o  If PESI score falls in I-III, with negative biomarkers, please order a PERT priority ECHO. o  If PESI score falls in IV-V or with positive biomarkers, please order STAT ECHO and alert the campus PERT via [facility] at [phone].   0205 BNP: 25  PESI score: I-III   6611 Spoke with the admitting team, who agreed with admission. This was relayed to patient. She expressed understanding. Also discussed with patient also at this time. She expressed understanding. Patient asked me to call her father, I attempted to call her father but there was no answer. Patient was given the opportunity ask questions the emergency department. All questions concerns were addressed the emergency department. 0231 hs TnI 2hr: 3  Delta 0                                       Medical Decision Making  See ED course for more details of medical decision making    Amount and/or Complexity of Data Reviewed  Labs: ordered. Decision-making details documented in ED Course. Radiology: ordered and independent interpretation performed. Decision-making details documented in ED Course. Risk  Prescription drug management. Decision regarding hospitalization.             Disposition  Final diagnoses:   Saddle embolism of pulmonary artery (HCC)   SOB (shortness of breath)   Chest pain     Time reflects when diagnosis was documented in both MDM as applicable and the Disposition within this note     Time User Action Codes Description Comment    9/7/2023  2:41 AM Jaswinder CABRERA Add [I26.92] Saddle embolism of pulmonary artery ([address])     9/7/2023  2:41 AM Jules Steward Add [R06.02] SOB (shortness of breath)     9/7/2023  2:41 AM Vaishali Pérez [address] [R07.9] Chest pain       ED Disposition     ED Disposition   Admit    Condition   Stable    Date/Time   Thu Sep 7, 2023  2:42 AM    Comment   Case was discussed with ERIKA and the patient's admission status was agreed to be Admission Status: observation status to the service of Dr. Althea Senior . Follow-up Information    None         Current Discharge Medication List      CONTINUE these medications which have NOT CHANGED    Details   albuterol (Ventolin HFA) 90 mcg/act inhaler Inhale 2 puffs every 6 (six) hours as needed for wheezing  Qty: 18 g, Refills: 5    Comments: Substitution to a formulary equivalent within the same pharmaceutical class is authorized. Associated Diagnoses: Chronic obstructive pulmonary disease, unspecified COPD type ([facility])      amLODIPine (NORVASC) 5 mg tablet TAKE 1 TABLET (5 MG TOTAL) BY MOUTH DAILY  Qty: 90 tablet, Refills: 3    Associated Diagnoses: Hypertension, unspecified type      apixaban (ELIQUIS) 5 mg Take 2 tablets (10 mg total) by mouth 2 (two) times a day for 7 days, THEN 1 tablet (5 mg total) 2 (two) times a day for 23 days, THEN 1 tablet (5 mg total) 2 (two) times a day. Qty: 74 tablet, Refills: 0    Comments: Eliquis Starter Pack  Associated Diagnoses: Acute saddle pulmonary embolism without acute cor pulmonale ([facility])      buPROPion (WELLBUTRIN XL) 300 mg 24 hr tablet Take 300 mg by mouth daily      cephalexin (KEFLEX) 500 mg capsule Take 500 mg by mouth 4 (four) times a day      Cholecalciferol (Vitamin D3) 125 MCG (5000 UT) CAPS Take by mouth in the morning      clobetasol (TEMOVATE) 0.05 % ointment Apply to affected areas sparingly. Do not exceed 2 weeks of treatment. Qty: 30 g, Refills: 0    Associated Diagnoses: Xerosis of skin      !!  Diclofenac Sodium (VOLTAREN) 1 % Apply 2 g topically 4 (four) times a day  Qty: 150 g, Refills: 0    Associated Diagnoses: Medial epicondylitis of right elbow      ergocalciferol (ERGOCALCIFEROL) 1.25 MG (33700 UT) capsule       estradiol (ESTRACE) 0.5 MG tablet Take 1 tablet (0.5 mg total) by mouth daily  Qty: 30 tablet, Refills: 4    Associated Diagnoses: Vasomotor symptoms due to menopause      furosemide (LASIX) 20 mg tablet Take 1 tablet (20 mg total) by mouth daily  Qty: 5 tablet, Refills: 0    Associated Diagnoses: Bilateral leg edema      haloperidol decanoate (Haldol Decanoate) 50 mg/mL injection every 30 (thirty) days      hydrocortisone 2.5 % ointment Apply topically 2 (two) times a day  Qty: 30 g, Refills: 0    Associated Diagnoses: Contact dermatitis, unspecified contact dermatitis type, unspecified trigger      levothyroxine (Euthyrox) 125 mcg tablet Take 1 tablet (125 mcg total) by mouth daily in the early morning  Qty: 90 tablet, Refills: 3    Associated Diagnoses: Acquired hypothyroidism      losartan (COZAAR) 50 mg tablet TAKE ONE TABLET BY MOUTH DAILY  Qty: 30 tablet, Refills: 9    Associated Diagnoses: Hypertension, unspecified type      metoprolol tartrate (LOPRESSOR) 25 mg tablet TAKE ONE TABLET BY MOUTH EVERY 12 HOURS  Qty: 60 tablet, Refills: 2    Associated Diagnoses: Hypertension, unspecified type      ondansetron (ZOFRAN-ODT) 4 mg disintegrating tablet Take 1 tablet (4 mg total) by mouth every 6 (six) hours as needed for nausea or vomiting  Qty: 20 tablet, Refills: 0    Associated Diagnoses: Nausea      RABEprazole (ACIPHEX) 20 MG tablet Take 20 mg by mouth 2 (two) times a day      rOPINIRole (REQUIP) 1 mg tablet       trospium chloride (SANCTURA) 20 mg tablet Take 20 mg by mouth 2 (two) times a day      ammonium lactate (LAC-HYDRIN) 12 % cream       carBAMazepine (CARBATROL) 300 MG 12 hr capsule Take 1 capsule by mouth in the morning      colestipol (COLESTID) 1 g tablet Take 1 tablet (1 g total) by mouth 2 (two) times a day  Qty: 60 tablet, Refills: 0    Associated Diagnoses: Mixed hyperlipidemia      cyproheptadine (PERIACTIN) 4 mg tablet       !!  Diclofenac Sodium (VOLTAREN) 1 % Apply 2 g topically 3 (three) times a day as needed (pain) For pain to affected area  Qty: 100 g, Refills: 0    Associated Diagnoses: Right knee pain      divalproex sodium (DEPAKOTE) 250 mg EC tablet Take 3 tablets (750 mg total) by mouth every 12 (twelve) hours  Qty: 180 tablet, Refills: 0    Associated Diagnoses: Mood disorder (HCC)      Fesoterodine Fumarate ER (Toviaz) 4 MG TB24 Take 1 tablet by mouth daily      fluticasone (FLOVENT HFA) 110 MCG/ACT inhaler Inhale 2 puffs as needed Rinse mouth after use. Lurasidone HCl 60 MG TABS Take 1 tablet (60 mg total) by mouth 2 (two) times a day TAKEN IN THE MORNING AND AT NIGHT-----Latuda  Qty: 60 tablet, Refills: 0    Associated Diagnoses: Borderline personality disorder (HCC)      memantine (NAMENDA) 10 mg tablet       pilocarpine (SALAGEN) 5 mg tablet Take 5 mg by mouth 2 (two) times a day      topiramate (TOPAMAX) 100 mg tablet Take 1.5 tablets (150 mg total) by mouth 2 (two) times a day  Qty: 90 tablet, Refills: 0    Associated Diagnoses: Schizoaffective disorder, bipolar type (HCC)      venlafaxine (EFFEXOR) 37.5 mg tablet Take 37.5 mg by mouth daily       ! ! - Potential duplicate medications found. Please discuss with provider. No discharge procedures on file. PDMP Review       Value Time User    PDMP Reviewed  Yes 8/30/2023  4:36 PM Aria Barreto DO           ED Provider  Attending physically available and evaluated Hipolito Rodas. I managed the patient along with the ED Attending.     Electronically Signed by         Jennifer Plaza DO  09/07/23 [id number]

## 2023-09-07 NOTE — ASSESSMENT & PLAN NOTE
· Recent admission 9/2-9/3 for large PE associated with right heart strain. She was placed on IV heparin drip, transitioned to Eliquis starting on 9/3  · Returns today for MARTINEZ and CP, ED workup unremarkable. Repeat CTA showed persistent saddle pulmonary embolism, clot burden is not significantly changed since prior exam.  · ECHO on 9/2 revealed LVEF of 60%.  Systolic function is normal. Wall motion is normal. Diastolic function is normal.  · Patient endorses compliance to Eliquis regimen since discharge    Plan  · Transitioned back to heparin gtt in the ED, will continue  · Eliquis Held  · Consider restarting on DOAC while inpatient   · Continue telemetry

## 2023-09-08 LAB
ANION GAP SERPL CALCULATED.3IONS-SCNC: 8 MMOL/L
ATRIAL RATE: 73 BPM
BASOPHILS # BLD AUTO: 0.04 THOUSANDS/ÂΜL (ref 0–0.1)
BASOPHILS NFR BLD AUTO: 1 % (ref 0–1)
BUN SERPL-MCNC: 13 MG/DL (ref 5–25)
CALCIUM SERPL-MCNC: 8.6 MG/DL (ref 8.4–10.2)
CHLORIDE SERPL-SCNC: 110 MMOL/L (ref 96–108)
CO2 SERPL-SCNC: 22 MMOL/L (ref 21–32)
CREAT SERPL-MCNC: 0.58 MG/DL (ref 0.6–1.3)
EOSINOPHIL # BLD AUTO: 0.09 THOUSAND/ÂΜL (ref 0–0.61)
EOSINOPHIL NFR BLD AUTO: 1 % (ref 0–6)
ERYTHROCYTE [DISTWIDTH] IN BLOOD BY AUTOMATED COUNT: 13.9 % (ref 11.6–15.1)
GFR SERPL CREATININE-BSD FRML MDRD: 107 ML/MIN/1.73SQ M
GLUCOSE SERPL-MCNC: 98 MG/DL (ref 65–140)
HCT VFR BLD AUTO: 45 % (ref 34.8–46.1)
HGB BLD-MCNC: 14.4 G/DL (ref 11.5–15.4)
IMM GRANULOCYTES # BLD AUTO: 0.11 THOUSAND/UL (ref 0–0.2)
IMM GRANULOCYTES NFR BLD AUTO: 1 % (ref 0–2)
LYMPHOCYTES # BLD AUTO: 2.85 THOUSANDS/ÂΜL (ref 0.6–4.47)
LYMPHOCYTES NFR BLD AUTO: 34 % (ref 14–44)
MCH RBC QN AUTO: 30.8 PG (ref 26.8–34.3)
MCHC RBC AUTO-ENTMCNC: 32 G/DL (ref 31.4–37.4)
MCV RBC AUTO: 96 FL (ref 82–98)
MONOCYTES # BLD AUTO: 0.82 THOUSAND/ÂΜL (ref 0.17–1.22)
MONOCYTES NFR BLD AUTO: 10 % (ref 4–12)
NEUTROPHILS # BLD AUTO: 4.45 THOUSANDS/ÂΜL (ref 1.85–7.62)
NEUTS SEG NFR BLD AUTO: 53 % (ref 43–75)
NRBC BLD AUTO-RTO: 0 /100 WBCS
P AXIS: 64 DEGREES
PLATELET # BLD AUTO: 150 THOUSANDS/UL (ref 149–390)
PMV BLD AUTO: 10.4 FL (ref 8.9–12.7)
POTASSIUM SERPL-SCNC: 4.1 MMOL/L (ref 3.5–5.3)
PR INTERVAL: 198 MS
QRS AXIS: -16 DEGREES
QRSD INTERVAL: 94 MS
QT INTERVAL: 382 MS
QTC INTERVAL: 420 MS
RBC # BLD AUTO: 4.68 MILLION/UL (ref 3.81–5.12)
SODIUM SERPL-SCNC: 140 MMOL/L (ref 135–147)
T WAVE AXIS: 59 DEGREES
VENTRICULAR RATE: 73 BPM
WBC # BLD AUTO: 8.36 THOUSAND/UL (ref 4.31–10.16)

## 2023-09-08 PROCEDURE — 93010 ELECTROCARDIOGRAM REPORT: CPT | Performed by: INTERNAL MEDICINE

## 2023-09-08 PROCEDURE — 80048 BASIC METABOLIC PNL TOTAL CA: CPT

## 2023-09-08 PROCEDURE — 85025 COMPLETE CBC W/AUTO DIFF WBC: CPT

## 2023-09-08 PROCEDURE — 99232 SBSQ HOSP IP/OBS MODERATE 35: CPT | Performed by: INTERNAL MEDICINE

## 2023-09-08 RX ORDER — WARFARIN SODIUM 5 MG/1
5 TABLET ORAL
Status: DISCONTINUED | OUTPATIENT
Start: 2023-09-08 | End: 2023-09-11

## 2023-09-08 RX ADMIN — PILOCARPINE HYDROCHLORIDE 5 MG: 5 TABLET, FILM COATED ORAL at 10:26

## 2023-09-08 RX ADMIN — LURASIDONE HYDROCHLORIDE 60 MG: 20 TABLET, COATED ORAL at 10:25

## 2023-09-08 RX ADMIN — ENOXAPARIN SODIUM 135 MG: 150 INJECTION SUBCUTANEOUS at 23:09

## 2023-09-08 RX ADMIN — WARFARIN SODIUM 5 MG: 5 TABLET ORAL at 17:08

## 2023-09-08 RX ADMIN — ENOXAPARIN SODIUM 135 MG: 150 INJECTION SUBCUTANEOUS at 12:59

## 2023-09-08 RX ADMIN — PILOCARPINE HYDROCHLORIDE 5 MG: 5 TABLET, FILM COATED ORAL at 17:10

## 2023-09-08 RX ADMIN — VENLAFAXINE 37.5 MG: 37.5 TABLET ORAL at 10:22

## 2023-09-08 RX ADMIN — CARBAMAZEPINE 300 MG: 100 TABLET, EXTENDED RELEASE ORAL at 10:25

## 2023-09-08 RX ADMIN — DIVALPROEX SODIUM 750 MG: 250 TABLET, DELAYED RELEASE ORAL at 10:26

## 2023-09-08 RX ADMIN — OXYBUTYNIN CHLORIDE 5 MG: 5 TABLET ORAL at 17:08

## 2023-09-08 RX ADMIN — TOPIRAMATE 150 MG: 100 TABLET, FILM COATED ORAL at 17:08

## 2023-09-08 RX ADMIN — OXYBUTYNIN CHLORIDE 5 MG: 5 TABLET ORAL at 10:22

## 2023-09-08 RX ADMIN — MEMANTINE 10 MG: 10 TABLET ORAL at 10:22

## 2023-09-08 RX ADMIN — LEVOTHYROXINE SODIUM 125 MCG: 125 TABLET ORAL at 05:45

## 2023-09-08 RX ADMIN — TOPIRAMATE 150 MG: 100 TABLET, FILM COATED ORAL at 10:21

## 2023-09-08 RX ADMIN — BUPROPION HYDROCHLORIDE 450 MG: 150 TABLET, FILM COATED, EXTENDED RELEASE ORAL at 10:21

## 2023-09-08 RX ADMIN — HYDROXYZINE HYDROCHLORIDE 25 MG: 25 TABLET ORAL at 10:21

## 2023-09-08 RX ADMIN — HYDROXYZINE HYDROCHLORIDE 25 MG: 25 TABLET ORAL at 17:08

## 2023-09-08 RX ADMIN — Medication 1000 UNITS: at 10:21

## 2023-09-08 NOTE — ASSESSMENT & PLAN NOTE
Patient has not been requiring supplemental oxygen throughout her stay. Goal SPO2 greater than 88%.     - Continue home albuterol as needed every 6 hours  - Continue Delanna Shade for home Flovent per pharmacy formulary

## 2023-09-08 NOTE — ASSESSMENT & PLAN NOTE
History of schizoaffective disorder bipolar  Patient is currently on a one-to-one due to concerns for her behavior in the room during rounds the morning of 9/8, she was noted to be wailing, covering her face, and kicking and thrashing about in the bed    - Continue home Depakote 750 mg twice daily; carbamazepine 300 mg twice daily; home Wellbutrin 300 mg and 150 mg tablet; home lurasidone 60 mg every morning, every afternoon; home Effexor 37.5 mg once a day

## 2023-09-08 NOTE — PROGRESS NOTES
[facility]  Progress Note  Name: Sandra Stiles  MRN: [id number]  Unit/Bed#: S -34 I Date of Admission: 9/6/2023   Date of Service: 9/8/2023 I Hospital Day: 1    Assessment/Plan   * Acute respiratory failure [facility])  Assessment & Plan  Presents with worsening MARTINEZ and chest pain for 1-2 days. States these are similar to symptoms she experienced prior to her previous admission 9/2-9/3 when she presented with dyspnea, CP, and cough and was found to have saddle PE. Also has a history of COPD and MAG, not on home oxygen    Currently not requiring supplemental oxygen, satting well on room air.   - Continue BIPAP @ night  - Plan for desaturation test/home O2 evaluation prior to d/c    Acute saddle pulmonary embolism ([address])  Assessment & Plan  Recent admission 9/2-9/3 for large PE associated with right heart strain. She was placed on IV heparin drip, transitioned to Eliquis starting on 9/3  Returns today for MARTINEZ and CP, ED workup unremarkable. Repeat CTA showed persistent saddle pulmonary embolism, clot burden is not significantly changed since prior exam.  ECHO on 9/2 revealed LVEF of 60%. Systolic function is normal. Wall motion is normal. Diastolic function is normal.  Patient endorses compliance to Eliquis regimen since discharge. She was placed on heparin drip at the time patient however in attempting to transition back to Eliquis concern was raised by pharmacy team for drug-drug interaction with this and carbamazepine. A similar interaction decreasing the efficacy of warfarin is observed in interaction with carbamazepine, however this will need to be monitored with serial PT/INR checks. Patient noted by nursing to have removed her own telemetry in the room. Plan  -Bridge to warfarin from therapeutic Lovenox. 5 mg first dose tonight  -Recheck PT/INR in the morning  -Continuous pulse ox    Anxiety  Assessment & Plan  Patient noted to be significantly anxious in the room. Additionally, received multiple notifications by RN throughout the day the patient has been anxious requiring multiple doses of as needed Atarax    -Ordered p.o. Atarax ordered 25 mg every 6 hours as needed    Schizoaffective disorder, bipolar type [facility])  Assessment & Plan  History of schizoaffective disorder bipolar  Patient is currently on a one-to-one due to concerns for her behavior in the room during rounds the morning of 9/8, she was noted to be wailing, covering her face, and kicking and thrashing about in the bed    - Continue home Depakote 750 mg twice daily; carbamazepine 300 mg twice daily; home Wellbutrin 300 mg and 150 mg tablet; home lurasidone 60 mg every morning, every afternoon; home Effexor 37.5 mg once a day    Substance abuse ([address])  Assessment & Plan  History of substance abuse methamphetamine, marijuana admitting last use on 9/2. Patient states on 9 8 that she has been feeling incredibly tired over the past few days and wants to sleep all day. We did discuss this may be due to her recent decrease in use of methamphetamine. Of note, patient removed telemetry yesterday per nursing.    - Counseled regarding cessation of illicit drugs  - Symptomatic management      COPD (chronic obstructive pulmonary disease) ([address])  Assessment & Plan  Patient has not been requiring supplemental oxygen throughout her stay. Goal SPO2 greater than 88%. - Continue home albuterol as needed every 6 hours  - Continue Alize Merino for home Flovent per pharmacy formulary    Sjogren's syndrome [facility])  Assessment & Plan  PTA pilocarpine    Overactive bladder  Assessment & Plan  PTA Trospium chloride (Sanctura)--nonformulary  Continue with oxybutynin  Currently on [facility]    Hypertension  Assessment & Plan  BP Readings from Last 3 Encounters:   09/08/23 104/76   09/06/23 104/78   09/03/23 125/72     Reviewed and acceptable.     Plan  - Continue prior to arrival regimen of Norvasc 5 mg once a day, Lasix 20 mg once a day, losartan 50 mg once a day, Lopressor 25 mg twice daily    MAG (obstructive sleep apnea)  Assessment & Plan  Continue BIPAP qhs    Hypothyroidism  Assessment & Plan  Continue PTA levothyroxine 125mcg qd           VTE Pharmacologic Prophylaxis: VTE Score: 13 High Risk (Score >/= 5) - Pharmacological DVT Prophylaxis Ordered: warfarin (Coumadin). Sequential Compression Devices Ordered. Patient Centered Rounds: I performed bedside rounds with nursing staff today. Discussions with Specialists or Other Care Team Provider: nursing    Education and Discussions with Family / Patient: Updated  (daughter) via phone. Current Length of Stay: 1 day(s)  Current Patient Status: Inpatient   Discharge Plan: Anticipate discharge in 48 hrs to home. Code Status: Level 1 - Full Code    Subjective:   Seen and examined at bedside. During my initial entry to the room this morning, she was difficult to wake from sleep but ultimately responsive to verbal stimuli, opening her eyes. On my second entry to the room, she was apologetic for being difficult to wake earlier, pleasantly conversant with me and stating that she has been quite tired over the past few days which potentially could be due to her withdrawing from methamphetamine use chronically. She continues to feel tired at this time, however she denies any ongoing chest pain, shortness of breath, or other acute concerns. When my attending and I entered the room during rounds, Rupa Carranza was noted to be holding her hands over her face, wailing incoherently, and kicking in the bed. She did not directly respond to any questions at that time. She stated she has been stuck in bed all day and was told yesterday she will be leaving today. We explained to her that we need to start her on warfarin, which will require monitoring her PT/INR levels and will require to stay in the hospital for some additional time.   At this news, she began to thrash more violently, swinging her arms and legs while kicking objects on the floor, and continued wailing. One-to-one observation order was placed due to concern for patient's safety at this time. Objective:     Vitals:   No data recorded. HR:  [55-58] 58  Resp:  [24] 24  BP: (104-115)/(68-76) 104/76  SpO2:  [94 %-97 %] 97 %  Body mass index is 45.9 kg/m². Input and Output Summary (last 24 hours): Intake/Output Summary (Last 24 hours) at 2023 1416  Last data filed at 2023 2149  Gross per 24 hour   Intake --   Output 900 ml   Net -900 ml       Physical Exam:   Physical Exam  Vitals reviewed. Constitutional:       General: She is sleeping. She is not in acute distress. Appearance: She is not toxic-appearing. HENT:      Head: Normocephalic and atraumatic. Right Ear: External ear normal.      Left Ear: External ear normal.   Eyes:      Extraocular Movements: Extraocular movements intact. Conjunctiva/sclera: Conjunctivae normal.   Cardiovascular:      Rate and Rhythm: Normal rate and regular rhythm. Pulses: Normal pulses. Pulmonary:      Effort: Pulmonary effort is normal. No respiratory distress. Breath sounds: Normal breath sounds. No wheezing, rhonchi or rales. Abdominal:      Palpations: Abdomen is soft. Tenderness: There is no abdominal tenderness. There is no guarding or rebound. Musculoskeletal:         General: Normal range of motion. Right lower le+ Edema present. Left lower le+ Edema present. Skin:     General: Skin is warm and dry. Neurological:      Mental Status: She is oriented to person, place, and time and easily aroused. Psychiatric:         Mood and Affect: Mood is anxious and depressed. Affect is labile. Comments: Initially, patient was cooperative. When returning to room on rounds, patient was agitated.           Additional Data:     Labs:  Results from last 7 days   Lab Units 23  0547   WBC Thousand/uL 8.36   HEMOGLOBIN g/dL 14.4   HEMATOCRIT % 45.0   PLATELETS Thousands/uL 150   NEUTROS PCT % 53   LYMPHS PCT % 34   MONOS PCT % 10   EOS PCT % 1     Results from last 7 days   Lab Units 09/08/23  0547 09/07/23  0824 09/06/23  2335   SODIUM mmol/L 140   < > 143   POTASSIUM mmol/L 4.1   < > 3.9   CHLORIDE mmol/L 110*   < > 111*   CO2 mmol/L 22   < > 25   BUN mg/dL 13   < > 12   CREATININE mg/dL 0.58*   < > 0.73   ANION GAP mmol/L 8   < > 7   CALCIUM mg/dL 8.6   < > 8.6   ALBUMIN g/dL  --   --  3.7   TOTAL BILIRUBIN mg/dL  --   --  0.20   ALK PHOS U/L  --   --  56   ALT U/L  --   --  35   AST U/L  --   --  25   GLUCOSE RANDOM mg/dL 98   < > 105    < > = values in this interval not displayed. Results from last 7 days   Lab Units 09/06/23  2335   INR  1.02     Results from last 7 days   Lab Units 09/06/23  2334   POC GLUCOSE mg/dl 107                Lines/Drains:  Invasive Devices     Peripheral Intravenous Line  Duration           Peripheral IV 09/06/23 Distal;Right;Upper;Ventral (anterior) Arm 1 day    Peripheral IV 09/06/23 Left Antecubital 1 day                  Telemetry:  Telemetry Orders (From admission, onward)             24 Hour Telemetry Monitoring  Continuous x 24 Hours (Telem)        Question:  Reason for 24 Hour Telemetry  Answer:  Pulmonary Embolism                 Telemetry Reviewed: Patient removed her telemetry yesterday, unable to review.   Indication for Continued Telemetry Use: PE             Imaging: Reviewed radiology reports from this admission including: CTA ed chest pe study    Recent Cultures (last 7 days):         Last 24 Hours Medication List:   Current Facility-Administered Medications   Medication Dose Route Frequency Provider Last Rate   • albuterol  2 puff Inhalation Q6H PRN Karen Meadows MD     • amLODIPine  5 mg Oral Daily Karen Meadows MD     • buPROPion  450 mg Oral Daily Karen Meadows MD     • carBAMazepine  300 mg Oral Q12H Karen Meadows MD     • cholecalciferol  1,000 Units Oral Daily Niesha Mash MD Gino     • colestipol  1 g Oral BID Marcel Pérez MD     • divalproex sodium  750 mg Oral Q12H [facility] Marcel Pérez MD     • enoxaparin  1 mg/kg Subcutaneous Q12H Valarie Arzate DO     • fluticasone  1 puff Inhalation Daily Marcel Pérez MD     • furosemide  20 mg Oral Daily Marcel Pérez MD     • hydrOXYzine HCL  25 mg Oral Q6H PRN Tammy Perez MD     • levothyroxine  125 mcg Oral Early Morning Marcel Pérez MD     • losartan  50 mg Oral Daily Marcel Pérez MD     • lurasidone  60 mg Oral BID Marcel Pérez MD     • memantine  10 mg Oral Daily Marcel Pérez MD     • metoprolol tartrate  25 mg Oral Q12H Rizwan Ulloa MD     • oxybutynin  5 mg Oral BID Marcel Pérez MD     • pilocarpine  5 mg Oral BID Marcel Pérez MD     • rOPINIRole  1 mg Oral HS Marcel Pérez MD     • topiramate  150 mg Oral BID Marcel Pérez MD     • venlafaxine  37.5 mg Oral Daily Marcel Pérez MD     • warfarin  5 mg Oral Daily (warfarin) Amandeep Matias DO          Today, Patient Was Seen By: Amandeep Matias DO    **Please Note: This note may have been constructed using a voice recognition system. **

## 2023-09-08 NOTE — ASSESSMENT & PLAN NOTE
Patient noted to be significantly anxious in the room. Additionally, received multiple notifications by RN throughout the day the patient has been anxious requiring multiple doses of as needed Atarax    -Ordered p.o.  Atarax ordered 25 mg every 6 hours as needed

## 2023-09-08 NOTE — ASSESSMENT & PLAN NOTE
Presents with worsening MARTINEZ and chest pain for 1-2 days. States these are similar to symptoms she experienced prior to her previous admission 9/2-9/3 when she presented with dyspnea, CP, and cough and was found to have saddle PE.   Also has a history of COPD and MAG, not on home oxygen    Currently not requiring supplemental oxygen, satting well on room air.   - Continue BIPAP @ night  - Plan for desaturation test/home O2 evaluation prior to d/c

## 2023-09-08 NOTE — PLAN OF CARE
Problem: PAIN - ADULT  Goal: Verbalizes/displays adequate comfort level or baseline comfort level  Description: Interventions:  - Encourage patient to monitor pain and request assistance  - Assess pain using appropriate pain scale  - Administer analgesics based on type and severity of pain and evaluate response  - Implement non-pharmacological measures as appropriate and evaluate response  - Consider cultural and social influences on pain and pain management  - Notify physician/advanced practitioner if interventions unsuccessful or patient reports new pain  Outcome: Progressing     Problem: SAFETY ADULT  Goal: Patient will remain free of falls  Description: INTERVENTIONS:  - Educate patient/family on patient safety including physical limitations  - Instruct patient to call for assistance with activity   - Consult OT/PT to assist with strengthening/mobility   - Keep Call bell within reach  - Keep bed low and locked with side rails adjusted as appropriate  - Keep care items and personal belongings within reach  - Initiate and maintain comfort rounds  - Make Fall Risk Sign visible to staff  - Offer Toileting every 2 Hours, in advance of need  - Initiate/Maintain bed/ chair alarm  - Obtain necessary fall risk management equipment  - Apply yellow socks and bracelet for high fall risk patients  - Consider moving patient to room near nurses station  Outcome: Progressing  Goal: Maintain or return to baseline ADL function  Description: INTERVENTIONS:  -  Assess patient's ability to carry out ADLs; assess patient's baseline for ADL function and identify physical deficits which impact ability to perform ADLs (bathing, care of mouth/teeth, toileting, grooming, dressing, etc.)  - Assess/evaluate cause of self-care deficits   - Assess range of motion  - Assess patient's mobility; develop plan if impaired  - Assess patient's need for assistive devices and provide as appropriate  - Encourage maximum independence but intervene and supervise when necessary  - Involve family in performance of ADLs  - Assess for home care needs following discharge   - Consider OT consult to assist with ADL evaluation and planning for discharge  - Provide patient education as appropriate  Outcome: Progressing  Goal: Maintains/Returns to pre admission functional level  Description: INTERVENTIONS:  - Perform BMAT or MOVE assessment daily.   - Set and communicate daily mobility goal to care team and patient/family/caregiver. - Collaborate with rehabilitation services on mobility goals if consulted  - Ambulate patient 3 times a day  - Out of bed to chair 2 times a day   - Out of bed for meals 2 times a day  - Out of bed for toileting  - Record patient progress and toleration of activity level   Outcome: Progressing     Problem: DISCHARGE PLANNING  Goal: Discharge to home or other facility with appropriate resources  Description: INTERVENTIONS:  - Identify barriers to discharge w/patient and caregiver  - Arrange for needed discharge resources and transportation as appropriate  - Identify discharge learning needs (meds, wound care, etc.)  - Arrange for interpretive services to assist at discharge as needed  - Refer to Case Management Department for coordinating discharge planning if the patient needs post-hospital services based on physician/advanced practitioner order or complex needs related to functional status, cognitive ability, or social support system  Outcome: Progressing     Problem: Knowledge Deficit  Goal: Patient/family/caregiver demonstrates understanding of disease process, treatment plan, medications, and discharge instructions  Description: Complete learning assessment and assess knowledge base.   Interventions:  - Provide teaching at level of understanding  - Provide teaching via preferred learning methods  Outcome: Progressing

## 2023-09-08 NOTE — ASSESSMENT & PLAN NOTE
History of substance abuse methamphetamine, marijuana admitting last use on 9/2. Patient states on 9 8 that she has been feeling incredibly tired over the past few days and wants to sleep all day. We did discuss this may be due to her recent decrease in use of methamphetamine.     Of note, patient removed telemetry yesterday per nursing.    - Counseled regarding cessation of illicit drugs  - Symptomatic management

## 2023-09-08 NOTE — ASSESSMENT & PLAN NOTE
BP Readings from Last 3 Encounters:   09/08/23 104/76   09/06/23 104/78   09/03/23 125/72     Reviewed and acceptable.     Plan  - Continue prior to arrival regimen of Norvasc 5 mg once a day, Lasix 20 mg once a day, losartan 50 mg once a day, Lopressor 25 mg twice daily

## 2023-09-08 NOTE — ASSESSMENT & PLAN NOTE
Recent admission 9/2-9/3 for large PE associated with right heart strain. She was placed on IV heparin drip, transitioned to Eliquis starting on 9/3  Returns today for MARTINEZ and CP, ED workup unremarkable. Repeat CTA showed persistent saddle pulmonary embolism, clot burden is not significantly changed since prior exam.  ECHO on 9/2 revealed LVEF of 60%. Systolic function is normal. Wall motion is normal. Diastolic function is normal.  Patient endorses compliance to Eliquis regimen since discharge. She was placed on heparin drip at the time patient however in attempting to transition back to Eliquis concern was raised by pharmacy team for drug-drug interaction with this and carbamazepine. A similar interaction decreasing the efficacy of warfarin is observed in interaction with carbamazepine, however this will need to be monitored with serial PT/INR checks. Patient noted by nursing to have removed her own telemetry in the room. Plan  -Bridge to warfarin from therapeutic Lovenox.  5 mg first dose tonight  -Recheck PT/INR in the morning  -Continuous pulse ox

## 2023-09-09 PROBLEM — I47.29 NON-SUSTAINED VENTRICULAR TACHYCARDIA (HCC): Status: ACTIVE | Noted: 2023-09-09

## 2023-09-09 LAB
ALBUMIN SERPL BCP-MCNC: 3.7 G/DL (ref 3.5–5)
ALP SERPL-CCNC: 52 U/L (ref 34–104)
ALT SERPL W P-5'-P-CCNC: 24 U/L (ref 7–52)
ANION GAP SERPL CALCULATED.3IONS-SCNC: 9 MMOL/L
AST SERPL W P-5'-P-CCNC: 16 U/L (ref 13–39)
BASOPHILS # BLD AUTO: 0.04 THOUSANDS/ÂΜL (ref 0–0.1)
BASOPHILS NFR BLD AUTO: 1 % (ref 0–1)
BILIRUB SERPL-MCNC: 0.25 MG/DL (ref 0.2–1)
BUN SERPL-MCNC: 15 MG/DL (ref 5–25)
CALCIUM SERPL-MCNC: 8.8 MG/DL (ref 8.4–10.2)
CHLORIDE SERPL-SCNC: 109 MMOL/L (ref 96–108)
CO2 SERPL-SCNC: 21 MMOL/L (ref 21–32)
CREAT SERPL-MCNC: 0.53 MG/DL (ref 0.6–1.3)
EOSINOPHIL # BLD AUTO: 0.04 THOUSAND/ÂΜL (ref 0–0.61)
EOSINOPHIL NFR BLD AUTO: 1 % (ref 0–6)
ERYTHROCYTE [DISTWIDTH] IN BLOOD BY AUTOMATED COUNT: 13.7 % (ref 11.6–15.1)
GFR SERPL CREATININE-BSD FRML MDRD: 110 ML/MIN/1.73SQ M
GLUCOSE SERPL-MCNC: 102 MG/DL (ref 65–140)
HCT VFR BLD AUTO: 47.1 % (ref 34.8–46.1)
HGB BLD-MCNC: 14.9 G/DL (ref 11.5–15.4)
IMM GRANULOCYTES # BLD AUTO: 0.07 THOUSAND/UL (ref 0–0.2)
IMM GRANULOCYTES NFR BLD AUTO: 1 % (ref 0–2)
INR PPP: 1.06 (ref 0.84–1.19)
LYMPHOCYTES # BLD AUTO: 2.16 THOUSANDS/ÂΜL (ref 0.6–4.47)
LYMPHOCYTES NFR BLD AUTO: 34 % (ref 14–44)
MCH RBC QN AUTO: 30.5 PG (ref 26.8–34.3)
MCHC RBC AUTO-ENTMCNC: 31.6 G/DL (ref 31.4–37.4)
MCV RBC AUTO: 96 FL (ref 82–98)
MONOCYTES # BLD AUTO: 0.65 THOUSAND/ÂΜL (ref 0.17–1.22)
MONOCYTES NFR BLD AUTO: 10 % (ref 4–12)
NEUTROPHILS # BLD AUTO: 3.48 THOUSANDS/ÂΜL (ref 1.85–7.62)
NEUTS SEG NFR BLD AUTO: 53 % (ref 43–75)
NRBC BLD AUTO-RTO: 0 /100 WBCS
PLATELET # BLD AUTO: 159 THOUSANDS/UL (ref 149–390)
PMV BLD AUTO: 10.4 FL (ref 8.9–12.7)
POTASSIUM SERPL-SCNC: 3.9 MMOL/L (ref 3.5–5.3)
PROT SERPL-MCNC: 6.6 G/DL (ref 6.4–8.4)
PROTHROMBIN TIME: 14.4 SECONDS (ref 11.6–14.5)
RBC # BLD AUTO: 4.89 MILLION/UL (ref 3.81–5.12)
SODIUM SERPL-SCNC: 139 MMOL/L (ref 135–147)
WBC # BLD AUTO: 6.44 THOUSAND/UL (ref 4.31–10.16)

## 2023-09-09 PROCEDURE — 85025 COMPLETE CBC W/AUTO DIFF WBC: CPT

## 2023-09-09 PROCEDURE — 80053 COMPREHEN METABOLIC PANEL: CPT

## 2023-09-09 PROCEDURE — 99232 SBSQ HOSP IP/OBS MODERATE 35: CPT | Performed by: INTERNAL MEDICINE

## 2023-09-09 PROCEDURE — G0426 INPT/ED TELECONSULT50: HCPCS | Performed by: GENERAL PRACTICE

## 2023-09-09 PROCEDURE — 85610 PROTHROMBIN TIME: CPT

## 2023-09-09 RX ORDER — LOPERAMIDE HYDROCHLORIDE 2 MG/1
2 CAPSULE ORAL 3 TIMES DAILY PRN
Status: DISCONTINUED | OUTPATIENT
Start: 2023-09-09 | End: 2023-09-12 | Stop reason: HOSPADM

## 2023-09-09 RX ORDER — MONTELUKAST SODIUM 4 MG/1
1 TABLET, CHEWABLE ORAL 2 TIMES DAILY
Status: DISCONTINUED | OUTPATIENT
Start: 2023-09-09 | End: 2023-09-12 | Stop reason: HOSPADM

## 2023-09-09 RX ADMIN — LEVOTHYROXINE SODIUM 125 MCG: 125 TABLET ORAL at 06:17

## 2023-09-09 RX ADMIN — VENLAFAXINE 37.5 MG: 37.5 TABLET ORAL at 09:26

## 2023-09-09 RX ADMIN — LOPERAMIDE HYDROCHLORIDE 2 MG: 2 CAPSULE ORAL at 10:10

## 2023-09-09 RX ADMIN — PILOCARPINE HYDROCHLORIDE 5 MG: 5 TABLET, FILM COATED ORAL at 18:09

## 2023-09-09 RX ADMIN — WARFARIN SODIUM 5 MG: 5 TABLET ORAL at 18:09

## 2023-09-09 RX ADMIN — PILOCARPINE HYDROCHLORIDE 5 MG: 5 TABLET, FILM COATED ORAL at 09:29

## 2023-09-09 RX ADMIN — COLESTIPOL HYDROCHLORIDE 1 G: 1 TABLET ORAL at 16:29

## 2023-09-09 RX ADMIN — TOPIRAMATE 150 MG: 100 TABLET, FILM COATED ORAL at 09:26

## 2023-09-09 RX ADMIN — MEMANTINE 10 MG: 10 TABLET ORAL at 09:26

## 2023-09-09 RX ADMIN — HYDROXYZINE HYDROCHLORIDE 25 MG: 25 TABLET ORAL at 18:09

## 2023-09-09 RX ADMIN — FUROSEMIDE 20 MG: 20 TABLET ORAL at 09:26

## 2023-09-09 RX ADMIN — Medication 1000 UNITS: at 09:26

## 2023-09-09 RX ADMIN — LURASIDONE HYDROCHLORIDE 60 MG: 20 TABLET, COATED ORAL at 09:28

## 2023-09-09 RX ADMIN — LOSARTAN POTASSIUM 50 MG: 50 TABLET, FILM COATED ORAL at 09:26

## 2023-09-09 RX ADMIN — DIVALPROEX SODIUM 750 MG: 250 TABLET, DELAYED RELEASE ORAL at 20:42

## 2023-09-09 RX ADMIN — OXYBUTYNIN CHLORIDE 5 MG: 5 TABLET ORAL at 09:26

## 2023-09-09 RX ADMIN — OXYBUTYNIN CHLORIDE 5 MG: 5 TABLET ORAL at 18:08

## 2023-09-09 RX ADMIN — FLUTICASONE FUROATE 1 PUFF: 100 POWDER RESPIRATORY (INHALATION) at 09:30

## 2023-09-09 RX ADMIN — CARBAMAZEPINE 300 MG: 100 TABLET, EXTENDED RELEASE ORAL at 23:31

## 2023-09-09 RX ADMIN — LOPERAMIDE HYDROCHLORIDE 2 MG: 2 CAPSULE ORAL at 13:30

## 2023-09-09 RX ADMIN — ENOXAPARIN SODIUM 135 MG: 150 INJECTION SUBCUTANEOUS at 13:21

## 2023-09-09 RX ADMIN — TOPIRAMATE 150 MG: 100 TABLET, FILM COATED ORAL at 18:09

## 2023-09-09 RX ADMIN — AMLODIPINE BESYLATE 5 MG: 5 TABLET ORAL at 09:26

## 2023-09-09 RX ADMIN — ROPINIROLE HYDROCHLORIDE 1 MG: 1 TABLET, FILM COATED ORAL at 20:41

## 2023-09-09 RX ADMIN — LURASIDONE HYDROCHLORIDE 60 MG: 20 TABLET, COATED ORAL at 23:31

## 2023-09-09 RX ADMIN — ENOXAPARIN SODIUM 135 MG: 150 INJECTION SUBCUTANEOUS at 23:29

## 2023-09-09 RX ADMIN — DIVALPROEX SODIUM 750 MG: 250 TABLET, DELAYED RELEASE ORAL at 09:28

## 2023-09-09 RX ADMIN — HYDROXYZINE HYDROCHLORIDE 25 MG: 25 TABLET ORAL at 12:04

## 2023-09-09 RX ADMIN — BUPROPION HYDROCHLORIDE 450 MG: 150 TABLET, FILM COATED, EXTENDED RELEASE ORAL at 09:26

## 2023-09-09 RX ADMIN — METOPROLOL TARTRATE 25 MG: 25 TABLET, FILM COATED ORAL at 09:26

## 2023-09-09 RX ADMIN — CARBAMAZEPINE 300 MG: 100 TABLET, EXTENDED RELEASE ORAL at 09:27

## 2023-09-09 RX ADMIN — METOPROLOL TARTRATE 37.5 MG: 25 TABLET, FILM COATED ORAL at 20:41

## 2023-09-09 NOTE — ASSESSMENT & PLAN NOTE
Reported 16 beat run of ventricular tachycardia overnight, patient was asymptomatic. Recent echocardiogram does not show depressed EF. Electrolytes within normal limits. Currently on Lopressor with a heart rate in the 60s. If continued nonsustained V. tach will increase Lopressor.

## 2023-09-09 NOTE — PROGRESS NOTES
[address] [facility]  Progress Note  Name: John Hunter  MRN: [id number]  Unit/Bed#: S -85 I Date of Admission: 9/6/2023   Date of Service: 9/9/2023 I Hospital Day: 2    Assessment/Plan   Acute saddle pulmonary embolism [facility])  Assessment & Plan  Patient had a recent admission on September 2 with a large PE associated right heart strain. She was placed on IV heparin and transition to Eliquis on September 3. She returned to the emergency department with dyspnea on exertion with an unremarkable emergency room work-up. Repeat CTA showed persistent saddle pulmonary embolus with an unchanged clot burden. Patient had endorsed compliance with Eliquis since discharge however she was transitioned back to heparin drip. Pharmacy reviewed her medical regimen and felt that there might be a drug drug interaction with carbamazepine decreasing the availability of Eliquis and thus recommended Coumadin. Patient is currently on a Lovenox to Coumadin bridge.     -Continue Coumadin 5 mg daily  -Continue daily INR, INR today 1  -Continue Lovenox bridge    Non-sustained ventricular tachycardia (HCC)  Assessment & Plan  Reported 16 beat run of ventricular tachycardia overnight, patient was asymptomatic. Recent echocardiogram does not show depressed EF. Electrolytes within normal limits. Currently on Lopressor with a heart rate in the 60s. If continued nonsustained V. tach will increase Lopressor. Substance abuse [facility])  Assessment & Plan  History of substance abuse with methamphetamine and marijuana with the last use on September 2.      -Case management did discussed with the patient opportunities for rehab and cessation      COPD (chronic obstructive pulmonary disease) ([address])  Assessment & Plan  Patient is without wheezes or hypoxia on exam this morning.       Continue home albuterol as needed as well as Ellipta (substituted for Flovent)    Sjogren's syndrome ([address])  Assessment & Plan  Continue prior to admission pilocarpine    Hypertension  Assessment & Plan  BP Readings from Last 3 Encounters:   09/09/23 136/94   09/06/23 104/78   09/03/23 125/72     Acceptable range. Plan  -Continue PTA regimen of amlodipine, Lasix, losartan and metoprolol    MAG (obstructive sleep apnea)  Assessment & Plan  Continue BiPAP nocturnal    Hypothyroidism  Assessment & Plan  Continue PTA levothyroxine    Schizoaffective disorder, bipolar type ([address])  Assessment & Plan  History of schizoaffective disorder border with bipolar. Was on a one-to-one yesterday due to concerns of aggressive behavior. This morning was resting comfortably. · Continue home Depakote twice daily  · Continue carbamazepine 300 mg twice daily  · Continue Wellbutrin 450 mg  · Continue home lurasidone and Effexor            VTE Pharmacologic Prophylaxis: VTE Score: 13 Moderate Risk (Score 3-4) - Pharmacological DVT Prophylaxis Ordered: enoxaparin (Lovenox). Patient Centered Rounds: I performed bedside rounds with nursing staff today. Discussions with Specialists or Other Care Team Provider: n/a    Education and Discussions with Family / Patient: Patient declined call to . Total Time Spent on Date of Encounter in care of patient: 35 minutes This time was spent on one or more of the following: performing physical exam; counseling and coordination of care; obtaining or reviewing history; documenting in the medical record; reviewing/ordering tests, medications or procedures; communicating with other healthcare professionals and discussing with patient's family/caregivers. Current Length of Stay: 2 day(s)  Current Patient Status: Inpatient   Certification Statement: The patient will continue to require additional inpatient hospital stay due to lovenox to coumadin bridge  Discharge Plan: Anticipate discharge in 48-72 hrs to home with home services. Code Status: Level 1 - Full Code    Subjective:   Patient is sleeping on BiPAP.   She denies any concerns when she is woken up. Objective:     Vitals:   Temp (24hrs), Av.3 °F (36.8 °C), Min:98.1 °F (36.7 °C), Max:98.4 °F (36.9 °C)    Temp:  [98.1 °F (36.7 °C)-98.4 °F (36.9 °C)] 98.4 °F (36.9 °C)  HR:  [62-69] 69  Resp:  [16-18] 16  BP: (136-160)/() 136/94  SpO2:  [95 %-96 %] 95 %  Body mass index is 45.9 kg/m². Input and Output Summary (last 24 hours):   No intake or output data in the 24 hours ending 23 1240    Physical Exam:   Physical Exam  Vitals and nursing note reviewed. Constitutional:       Appearance: Normal appearance. She is not ill-appearing or diaphoretic. HENT:      Head: Normocephalic. Nose: Nose normal. No congestion. Mouth/Throat:      Mouth: Mucous membranes are moist.      Pharynx: No oropharyngeal exudate. Eyes:      General: No scleral icterus. Conjunctiva/sclera: Conjunctivae normal.   Cardiovascular:      Rate and Rhythm: Normal rate and regular rhythm. Pulmonary:      Effort: Pulmonary effort is normal. No respiratory distress. Breath sounds: No wheezing. Abdominal:      General: Bowel sounds are normal. There is no distension. Palpations: Abdomen is soft. Tenderness: There is no abdominal tenderness. Musculoskeletal:      Cervical back: Normal range of motion and neck supple. No rigidity. Skin:     General: Skin is warm. Coloration: Skin is not jaundiced. Neurological:      Mental Status: She is alert.           Additional Data:     Labs:  Results from last 7 days   Lab Units 23  0617   WBC Thousand/uL 6.44   HEMOGLOBIN g/dL 14.9   HEMATOCRIT % 47.1*   PLATELETS Thousands/uL 159   NEUTROS PCT % 53   LYMPHS PCT % 34   MONOS PCT % 10   EOS PCT % 1     Results from last 7 days   Lab Units 23  0617   SODIUM mmol/L 139   POTASSIUM mmol/L 3.9   CHLORIDE mmol/L 109*   CO2 mmol/L 21   BUN mg/dL 15   CREATININE mg/dL 0.53*   ANION GAP mmol/L 9   CALCIUM mg/dL 8.8   ALBUMIN g/dL 3.7   TOTAL BILIRUBIN mg/dL 0.25   ALK PHOS U/L 52   ALT U/L 24   AST U/L 16   GLUCOSE RANDOM mg/dL 102     Results from last 7 days   Lab Units 09/09/23  0617   INR  1.06     Results from last 7 days   Lab Units 09/06/23  2334   POC GLUCOSE mg/dl 107               Lines/Drains:  Invasive Devices     Peripheral Intravenous Line  Duration           Peripheral IV 09/06/23 Distal;Right;Upper;Ventral (anterior) Arm 2 days    Peripheral IV 09/06/23 Left Antecubital 2 days                  Telemetry:  Telemetry Orders (From admission, onward)             24 Hour Telemetry Monitoring  Continuous x 24 Hours (Telem)        Question:  Reason for 24 Hour Telemetry  Answer:  Pulmonary Embolism                 Telemetry Reviewed: Normal Sinus Rhythm  Indication for Continued Telemetry Use: Arrthymias requiring medical therapy             Imaging: Personally reviewed the following imaging: ECHO    Recent Cultures (last 7 days):         Last 24 Hours Medication List:   Current Facility-Administered Medications   Medication Dose Route Frequency Provider Last Rate   • albuterol  2 puff Inhalation Q6H PRN Asuncion Rush MD     • amLODIPine  5 mg Oral Daily Asuncion Ruhs MD     • buPROPion  450 mg Oral Daily Asuncion Rush MD     • carBAMazepine  300 mg Oral Q12H Asuncion Rush MD     • cholecalciferol  1,000 Units Oral Daily Asuncion Rush MD     • colestipol  1 g Oral BID Asuncion Rush MD     • divalproex sodium  750 mg Oral Q12H Jasmin Man MD     • enoxaparin  1 mg/kg Subcutaneous Q12H Mary Jo Schofield DO     • fluticasone  1 puff Inhalation Daily Asuncion Rush MD     • furosemide  20 mg Oral Daily Asuncion Rush MD     • hydrOXYzine HCL  25 mg Oral Q6H PRN Rickie Saint, MD     • levothyroxine  125 mcg Oral Early Morning Asuncion Rush MD     • loperamide  2 mg Oral TID PRN Rebecca Huang MD     • losartan  50 mg Oral Daily Asuncion Rush MD     • lurasidone  60 mg Oral BID Asuncion Rush MD     • memantine  10 mg Oral Daily Mishel Sprague MD     • metoprolol tartrate  25 mg Oral Q12H Esperanza Katz MD     • oxybutynin  5 mg Oral BID Mishel Sprague MD     • pilocarpine  5 mg Oral BID Mishel Sprague MD     • rOPINIRole  1 mg Oral HS Mishel Sprague MD     • topiramate  150 mg Oral BID Mishel Sprague MD     • venlafaxine  37.5 mg Oral Daily Mishel Sprague MD     • warfarin  5 mg Oral Daily (warfarin) Yamini Singh, DO          Today, Patient Was Seen By: Esperanza Katz MD    **Please Note: This note may have been constructed using a voice recognition system. **

## 2023-09-09 NOTE — ASSESSMENT & PLAN NOTE
Patient is without wheezes or hypoxia on exam this morning.       Continue home albuterol as needed as well as Ellipta (substituted for Flovent)

## 2023-09-09 NOTE — ASSESSMENT & PLAN NOTE
BP Readings from Last 3 Encounters:   09/09/23 136/94   09/06/23 104/78   09/03/23 125/72     Acceptable range.     Plan  -Continue PTA regimen of amlodipine, Lasix, losartan and metoprolol

## 2023-09-09 NOTE — ASSESSMENT & PLAN NOTE
Patient had a recent admission on September 2 with a large PE associated right heart strain. She was placed on IV heparin and transition to Eliquis on September 3. She returned to the emergency department with dyspnea on exertion with an unremarkable emergency room work-up. Repeat CTA showed persistent saddle pulmonary embolus with an unchanged clot burden. Patient had endorsed compliance with Eliquis since discharge however she was transitioned back to heparin drip. Pharmacy reviewed her medical regimen and felt that there might be a drug drug interaction with carbamazepine decreasing the availability of Eliquis and thus recommended Coumadin.   Patient is currently on a Lovenox to Coumadin bridge.     -Continue Coumadin 5 mg daily  -Continue daily INR, INR today 1  -Continue Lovenox bridge

## 2023-09-09 NOTE — ASSESSMENT & PLAN NOTE
History of schizoaffective disorder border with bipolar. Was on a one-to-one yesterday due to concerns of aggressive behavior. This morning was resting comfortably.      · Continue home Depakote twice daily  · Continue carbamazepine 300 mg twice daily  · Continue Wellbutrin 450 mg  · Continue home lurasidone and Effexor

## 2023-09-09 NOTE — ASSESSMENT & PLAN NOTE
History of substance abuse with methamphetamine and marijuana with the last use on September 2.      -Case management did discussed with the patient opportunities for rehab and cessation

## 2023-09-10 PROBLEM — I47.29 NON-SUSTAINED VENTRICULAR TACHYCARDIA (HCC): Status: RESOLVED | Noted: 2023-09-09 | Resolved: 2023-09-10

## 2023-09-10 LAB
ANION GAP SERPL CALCULATED.3IONS-SCNC: 9 MMOL/L
BASOPHILS # BLD AUTO: 0.05 THOUSANDS/ÂΜL (ref 0–0.1)
BASOPHILS NFR BLD AUTO: 1 % (ref 0–1)
BUN SERPL-MCNC: 13 MG/DL (ref 5–25)
CALCIUM SERPL-MCNC: 8.8 MG/DL (ref 8.4–10.2)
CHLORIDE SERPL-SCNC: 107 MMOL/L (ref 96–108)
CO2 SERPL-SCNC: 24 MMOL/L (ref 21–32)
CREAT SERPL-MCNC: 0.68 MG/DL (ref 0.6–1.3)
EOSINOPHIL # BLD AUTO: 0.06 THOUSAND/ÂΜL (ref 0–0.61)
EOSINOPHIL NFR BLD AUTO: 1 % (ref 0–6)
ERYTHROCYTE [DISTWIDTH] IN BLOOD BY AUTOMATED COUNT: 13.7 % (ref 11.6–15.1)
GFR SERPL CREATININE-BSD FRML MDRD: 101 ML/MIN/1.73SQ M
GLUCOSE SERPL-MCNC: 89 MG/DL (ref 65–140)
HCT VFR BLD AUTO: 45.9 % (ref 34.8–46.1)
HGB BLD-MCNC: 14.6 G/DL (ref 11.5–15.4)
IMM GRANULOCYTES # BLD AUTO: 0.11 THOUSAND/UL (ref 0–0.2)
IMM GRANULOCYTES NFR BLD AUTO: 2 % (ref 0–2)
INR PPP: 1.44 (ref 0.84–1.19)
LYMPHOCYTES # BLD AUTO: 2.31 THOUSANDS/ÂΜL (ref 0.6–4.47)
LYMPHOCYTES NFR BLD AUTO: 34 % (ref 14–44)
MCH RBC QN AUTO: 30.2 PG (ref 26.8–34.3)
MCHC RBC AUTO-ENTMCNC: 31.8 G/DL (ref 31.4–37.4)
MCV RBC AUTO: 95 FL (ref 82–98)
MONOCYTES # BLD AUTO: 0.7 THOUSAND/ÂΜL (ref 0.17–1.22)
MONOCYTES NFR BLD AUTO: 10 % (ref 4–12)
NEUTROPHILS # BLD AUTO: 3.5 THOUSANDS/ÂΜL (ref 1.85–7.62)
NEUTS SEG NFR BLD AUTO: 52 % (ref 43–75)
NRBC BLD AUTO-RTO: 0 /100 WBCS
PLATELET # BLD AUTO: 168 THOUSANDS/UL (ref 149–390)
PMV BLD AUTO: 10.3 FL (ref 8.9–12.7)
POTASSIUM SERPL-SCNC: 4.1 MMOL/L (ref 3.5–5.3)
PROTHROMBIN TIME: 18.3 SECONDS (ref 11.6–14.5)
RBC # BLD AUTO: 4.84 MILLION/UL (ref 3.81–5.12)
SODIUM SERPL-SCNC: 140 MMOL/L (ref 135–147)
WBC # BLD AUTO: 6.73 THOUSAND/UL (ref 4.31–10.16)

## 2023-09-10 PROCEDURE — 85610 PROTHROMBIN TIME: CPT

## 2023-09-10 PROCEDURE — 99232 SBSQ HOSP IP/OBS MODERATE 35: CPT | Performed by: INTERNAL MEDICINE

## 2023-09-10 PROCEDURE — 80048 BASIC METABOLIC PNL TOTAL CA: CPT

## 2023-09-10 PROCEDURE — 85025 COMPLETE CBC W/AUTO DIFF WBC: CPT

## 2023-09-10 RX ORDER — LORAZEPAM 2 MG/ML
1 INJECTION INTRAMUSCULAR ONCE
Status: DISCONTINUED | OUTPATIENT
Start: 2023-09-10 | End: 2023-09-10

## 2023-09-10 RX ORDER — LORAZEPAM 2 MG/ML
2 INJECTION INTRAMUSCULAR ONCE
Status: COMPLETED | OUTPATIENT
Start: 2023-09-10 | End: 2023-09-10

## 2023-09-10 RX ORDER — DIPHENHYDRAMINE HYDROCHLORIDE, ZINC ACETATE 2; .1 G/100G; G/100G
CREAM TOPICAL 3 TIMES DAILY PRN
Status: DISCONTINUED | OUTPATIENT
Start: 2023-09-10 | End: 2023-09-12 | Stop reason: HOSPADM

## 2023-09-10 RX ADMIN — DIVALPROEX SODIUM 750 MG: 250 TABLET, DELAYED RELEASE ORAL at 09:35

## 2023-09-10 RX ADMIN — MEMANTINE 10 MG: 10 TABLET ORAL at 09:36

## 2023-09-10 RX ADMIN — TOPIRAMATE 150 MG: 100 TABLET, FILM COATED ORAL at 09:36

## 2023-09-10 RX ADMIN — DIPHENHYDRAMINE HYDROCHLORIDE, ZINC ACETATE: 2; .1 CREAM TOPICAL at 14:03

## 2023-09-10 RX ADMIN — LORAZEPAM 2 MG: 2 INJECTION INTRAMUSCULAR; INTRAVENOUS at 20:57

## 2023-09-10 RX ADMIN — LOSARTAN POTASSIUM 50 MG: 50 TABLET, FILM COATED ORAL at 09:36

## 2023-09-10 RX ADMIN — BUPROPION HYDROCHLORIDE 450 MG: 150 TABLET, FILM COATED, EXTENDED RELEASE ORAL at 09:36

## 2023-09-10 RX ADMIN — ROPINIROLE HYDROCHLORIDE 1 MG: 1 TABLET, FILM COATED ORAL at 21:16

## 2023-09-10 RX ADMIN — DIVALPROEX SODIUM 750 MG: 250 TABLET, DELAYED RELEASE ORAL at 21:18

## 2023-09-10 RX ADMIN — FLUTICASONE FUROATE 1 PUFF: 100 POWDER RESPIRATORY (INHALATION) at 09:35

## 2023-09-10 RX ADMIN — TOPIRAMATE 150 MG: 100 TABLET, FILM COATED ORAL at 17:04

## 2023-09-10 RX ADMIN — COLESTIPOL HYDROCHLORIDE 1 G: 1 TABLET ORAL at 09:35

## 2023-09-10 RX ADMIN — CARBAMAZEPINE 300 MG: 100 TABLET, EXTENDED RELEASE ORAL at 21:19

## 2023-09-10 RX ADMIN — AMLODIPINE BESYLATE 5 MG: 5 TABLET ORAL at 09:36

## 2023-09-10 RX ADMIN — LURASIDONE HYDROCHLORIDE 60 MG: 20 TABLET, COATED ORAL at 10:07

## 2023-09-10 RX ADMIN — METOPROLOL TARTRATE 37.5 MG: 25 TABLET, FILM COATED ORAL at 21:16

## 2023-09-10 RX ADMIN — METOPROLOL TARTRATE 37.5 MG: 25 TABLET, FILM COATED ORAL at 09:36

## 2023-09-10 RX ADMIN — COLESTIPOL HYDROCHLORIDE 1 G: 1 TABLET ORAL at 21:23

## 2023-09-10 RX ADMIN — PILOCARPINE HYDROCHLORIDE 5 MG: 5 TABLET, FILM COATED ORAL at 09:35

## 2023-09-10 RX ADMIN — OXYBUTYNIN CHLORIDE 5 MG: 5 TABLET ORAL at 09:36

## 2023-09-10 RX ADMIN — Medication 1000 UNITS: at 09:36

## 2023-09-10 RX ADMIN — OXYBUTYNIN CHLORIDE 5 MG: 5 TABLET ORAL at 17:04

## 2023-09-10 RX ADMIN — HYDROXYZINE HYDROCHLORIDE 25 MG: 25 TABLET ORAL at 10:14

## 2023-09-10 RX ADMIN — FUROSEMIDE 20 MG: 20 TABLET ORAL at 09:37

## 2023-09-10 RX ADMIN — ENOXAPARIN SODIUM 135 MG: 150 INJECTION SUBCUTANEOUS at 11:59

## 2023-09-10 RX ADMIN — WARFARIN SODIUM 5 MG: 5 TABLET ORAL at 17:04

## 2023-09-10 RX ADMIN — PILOCARPINE HYDROCHLORIDE 5 MG: 5 TABLET, FILM COATED ORAL at 17:04

## 2023-09-10 RX ADMIN — LURASIDONE HYDROCHLORIDE 60 MG: 20 TABLET, COATED ORAL at 21:17

## 2023-09-10 RX ADMIN — VENLAFAXINE 37.5 MG: 37.5 TABLET ORAL at 09:36

## 2023-09-10 RX ADMIN — LEVOTHYROXINE SODIUM 125 MCG: 125 TABLET ORAL at 06:27

## 2023-09-10 RX ADMIN — CARBAMAZEPINE 300 MG: 100 TABLET, EXTENDED RELEASE ORAL at 10:07

## 2023-09-10 NOTE — ASSESSMENT & PLAN NOTE
History of schizoaffective disorder border with bipolar. Was on a one-to-one on Friday due to concerns of aggressive behavior. This morning was resting comfortably. However, RN reported she continues to have outbursts when she does not get her way.     · Continue home Depakote twice daily  · Continue carbamazepine 300 mg twice daily  · Continue Wellbutrin 450 mg  · Continue home lurasidone and Effexor

## 2023-09-10 NOTE — PROGRESS NOTES
[address] [facility]  Progress Note  Name: India Miranda  MRN: [id number]  Unit/Bed#: S -98 I Date of Admission: 9/6/2023   Date of Service: 9/10/2023 I Hospital Day: 3    Assessment/Plan   * Acute respiratory failure [facility])  Assessment & Plan  Presents with worsening MARTINEZ and chest pain for 1-2 days. States these are similar to symptoms she experienced prior to her previous admission 9/2-9/3 when she presented with dyspnea, CP, and cough and was found to have saddle PE. Also has a history of COPD and MAG, not on home oxygen. She states she primarily becomes short of breath when having long conversations or exerting herself by walking. Currently not requiring supplemental oxygen, satting well on room air.   - Continue BIPAP @ night  - Plan for desaturation test/home O2 evaluation prior to d/c    Acute saddle pulmonary embolism [facility])  Assessment & Plan  Patient had a recent admission on September 2 with a large PE associated right heart strain. She was placed on IV heparin and transition to Eliquis on September 3. She returned to the emergency department with dyspnea on exertion with an unremarkable emergency room work-up. Repeat CTA showed persistent saddle pulmonary embolus with an unchanged clot burden. Patient had endorsed compliance with Eliquis since discharge however she was transitioned back to heparin drip. Pharmacy reviewed her medical regimen and felt that there might be a drug drug interaction with carbamazepine decreasing the availability of Eliquis and thus recommended Coumadin. Patient is currently on a Lovenox to Coumadin bridge. Recent Labs     09/09/23  0617 09/10/23  0913   INR 1.06 1.44*     -Continue Coumadin 5 mg daily, first dose was received on 9/9 with improvement in INR at 48hrs today  -Continue trending daily INR  -Continue Lovenox bridge    Anxiety  Assessment & Plan  Patient noted to be significantly anxious in the room.   Additionally, received multiple notifications by RN throughout the day the patient has been anxious requiring multiple doses of as needed Atarax    -Ordered p.o. Atarax ordered 25 mg every 6 hours as needed    Schizoaffective disorder, bipolar type [facility])  Assessment & Plan  History of schizoaffective disorder border with bipolar. Was on a one-to-one on Friday due to concerns of aggressive behavior. This morning was resting comfortably. However, RN reported she continues to have outbursts when she does not get her way. · Continue home Depakote twice daily  · Continue carbamazepine 300 mg twice daily  · Continue Wellbutrin 450 mg  · Continue home lurasidone and Effexor    Substance abuse ([address])  Assessment & Plan  History of substance abuse with methamphetamine and marijuana with the last use on September 2. Urine tox screen positive for amphetamines, THC on admission.    -Case management did discuss with the patient opportunities for rehab and cessation      COPD (chronic obstructive pulmonary disease) ([address])  Assessment & Plan  Patient is again without wheezes or hypoxia on exam this morning. BiPAP worn overnight. Continue home albuterol as needed as well as Ellipta (substituted for Flovent)    Sjogren's syndrome ([address])  Assessment & Plan  Continue prior to admission pilocarpine    Overactive bladder  Assessment & Plan  PTA Trospium chloride (Sanctura)--nonformulary  Continue with oxybutynin  Currently on [facility]    Hypertension  Assessment & Plan  Blood Pressure: 123/59    Blood pressure reviewed and acceptable. Plan  -Continue PTA regimen of amlodipine, Lasix, losartan and metoprolol    MAG (obstructive sleep apnea)  Assessment & Plan  Continue BiPAP nocturnal    Hypothyroidism  Assessment & Plan  Continue PTA levothyroxine 125mg qd    Non-sustained ventricular tachycardia (HCC)-resolved as of 9/10/2023  Assessment & Plan  Reported 16 beat run of ventricular tachycardia overnight 9/8-9/9, patient was asymptomatic.   Recent echocardiogram does not show depressed EF. Electrolytes within normal limits. Currently on Lopressor with a heart rate in the 60s. If continued nonsustained V. tach will increase Lopressor. No repeat events o/n -9/10. 3VTE Pharmacologic Prophylaxis: VTE Score: 13 High Risk (Score >/= 5) - Pharmacological DVT Prophylaxis Ordered: lovenox bridging to warfarin. Sequential Compression Devices Ordered. Patient Centered Rounds: I performed bedside rounds with nursing staff today. Discussions with Specialists or Other Care Team Provider: nursing    Education and Discussions with Family / Patient: Patient declined call to . Current Length of Stay: 3 day(s)  Current Patient Status: Inpatient   Discharge Plan: Anticipate discharge in 24-48 hrs to home. Code Status: Level 1 - Full Code    Subjective:   Seen and examined at bedside. She is resting comfortably and reports no acute concerns. However, per nursing patient became agitated earlier when there was an issue with her breakfast order and shattered glassware/dishes by throwing items into the hallway. Objective:     Vitals:   Temp (24hrs), Av.4 °F (36.9 °C), Min:98.4 °F (36.9 °C), Max:98.4 °F (36.9 °C)    Temp:  [98.4 °F (36.9 °C)] 98.4 °F (36.9 °C)  HR:  [56-67] 56  Resp:  [16] 16  BP: (106-123)/(59-62) 123/59  SpO2:  [93 %] 93 %  Body mass index is 45.9 kg/m². Input and Output Summary (last 24 hours):   No intake or output data in the 24 hours ending 09/10/23 1211    Physical Exam:   Physical Exam  Vitals reviewed. Constitutional:       General: She is sleeping. She is not in acute distress. Appearance: She is not toxic-appearing. Comments: Sleeping upon initial entry, awakens to voice and physical touch   HENT:      Head: Normocephalic and atraumatic. Right Ear: External ear normal.      Left Ear: External ear normal.   Eyes:      Extraocular Movements: Extraocular movements intact. Conjunctiva/sclera: Conjunctivae normal.   Cardiovascular:      Rate and Rhythm: Normal rate and regular rhythm. Pulses: Normal pulses. Pulmonary:      Effort: Pulmonary effort is normal. No respiratory distress. Breath sounds: Normal breath sounds. No wheezing, rhonchi or rales. Abdominal:      Palpations: Abdomen is soft. Tenderness: There is no abdominal tenderness. There is no guarding or rebound. Musculoskeletal:         General: Normal range of motion. Right lower le+ Edema present. Left lower le+ Edema present. Skin:     General: Skin is warm and dry. Neurological:      Mental Status: She is oriented to person, place, and time and easily aroused. Psychiatric:         Mood and Affect: Mood is anxious and depressed. Affect is labile.           Additional Data:     Labs:  Results from last 7 days   Lab Units 09/10/23  0913   WBC Thousand/uL 6.73   HEMOGLOBIN g/dL 14.6   HEMATOCRIT % 45.9   PLATELETS Thousands/uL 168   NEUTROS PCT % 52   LYMPHS PCT % 34   MONOS PCT % 10   EOS PCT % 1     Results from last 7 days   Lab Units 09/10/23  0913 23  0617   SODIUM mmol/L 140 139   POTASSIUM mmol/L 4.1 3.9   CHLORIDE mmol/L 107 109*   CO2 mmol/L 24 21   BUN mg/dL 13 15   CREATININE mg/dL 0.68 0.53*   ANION GAP mmol/L 9 9   CALCIUM mg/dL 8.8 8.8   ALBUMIN g/dL  --  3.7   TOTAL BILIRUBIN mg/dL  --  0.25   ALK PHOS U/L  --  52   ALT U/L  --  24   AST U/L  --  16   GLUCOSE RANDOM mg/dL 89 102     Results from last 7 days   Lab Units 09/10/23  0913   INR  1.44*     Results from last 7 days   Lab Units 23  2334   POC GLUCOSE mg/dl 107               Lines/Drains:  Invasive Devices     Peripheral Intravenous Line  Duration           Peripheral IV 23 Distal;Right;Upper;Ventral (anterior) Arm 3 days    Peripheral IV 23 Left Antecubital 3 days                  Telemetry:  Telemetry Orders (From admission, onward)             24 Hour Telemetry Monitoring  Continuous x 24 Hours (Telem)        Question:  Reason for 24 Hour Telemetry  Answer:  Pulmonary Embolism                 Telemetry Reviewed: Normal Sinus Rhythm  Indication for Continued Telemetry Use: No indication for continued use. Will discontinue.                Imaging: Reviewed radiology reports from this admission including: chest xray and chest CT scan    Recent Cultures (last 7 days):         Last 24 Hours Medication List:   Current Facility-Administered Medications   Medication Dose Route Frequency Provider Last Rate   • albuterol  2 puff Inhalation Q6H PRN Mishel Sprague MD     • amLODIPine  5 mg Oral Daily Mishel Sprague MD     • buPROPion  450 mg Oral Daily Mishel Sprague MD     • carBAMazepine  300 mg Oral Q12H Mishel Sprague MD     • cholecalciferol  1,000 Units Oral Daily Mishel Sprague MD     • colestipol  1 g Oral BID Esperanza Katz MD     • diphenhydrAMINE-zinc acetate   Topical TID PRN Yuli Anders DO     • divalproex sodium  750 mg Oral Q12H Esperanza Katz MD     • enoxaparin  1 mg/kg Subcutaneous Q12H Britni Garcia DO     • fluticasone  1 puff Inhalation Daily Mishel Sprague MD     • furosemide  20 mg Oral Daily Mishel Sprague MD     • hydrOXYzine HCL  25 mg Oral Q6H PRN Angel Hunter MD     • levothyroxine  125 mcg Oral Early Morning Mishel Sprague MD     • loperamide  2 mg Oral TID PRN Sabine Peña MD     • losartan  50 mg Oral Daily Mishel Sprague MD     • lurasidone  60 mg Oral BID Mishel Sprague MD     • memantine  10 mg Oral Daily Mishel Sprague MD     • metoprolol tartrate  37.5 mg Oral Q12H [address]  Esperanza Katz MD     • oxybutynin  5 mg Oral BID Mishel Sprague MD     • pilocarpine  5 mg Oral BID Mishel Sprague MD     • rOPINIRole  1 mg Oral HS Mishel Sprague MD     • topiramate  150 mg Oral BID Mishel Sprague MD     • venlafaxine  37.5 mg Oral Daily Mishel Sprague MD     • warfarin  5 mg Oral Daily (warfarin) Yamini Singh, DO Today, Patient Was Seen By: Larissa Ordaz DO    **Please Note: This note may have been constructed using a voice recognition system. **

## 2023-09-10 NOTE — ASSESSMENT & PLAN NOTE
Patient is again without wheezes or hypoxia on exam this morning. BiPAP worn overnight.     Continue home albuterol as needed as well as Ellipta (substituted for Flovent)

## 2023-09-10 NOTE — ASSESSMENT & PLAN NOTE
Reported 16 beat run of ventricular tachycardia overnight 9/8-9/9, patient was asymptomatic. Recent echocardiogram does not show depressed EF. Electrolytes within normal limits. Currently on Lopressor with a heart rate in the 60s. If continued nonsustained V. tach will increase Lopressor. No repeat events o/n 9/9-9/10.

## 2023-09-10 NOTE — ASSESSMENT & PLAN NOTE
Patient had a recent admission on September 2 with a large PE associated right heart strain. She was placed on IV heparin and transition to Eliquis on September 3. She returned to the emergency department with dyspnea on exertion with an unremarkable emergency room work-up. Repeat CTA showed persistent saddle pulmonary embolus with an unchanged clot burden. Patient had endorsed compliance with Eliquis since discharge however she was transitioned back to heparin drip. Pharmacy reviewed her medical regimen and felt that there might be a drug drug interaction with carbamazepine decreasing the availability of Eliquis and thus recommended Coumadin. Patient is currently on a Lovenox to Coumadin bridge.      Recent Labs     09/09/23  0617 09/10/23  0913   INR 1.06 1.44*     -Continue Coumadin 5 mg daily, first dose was received on 9/9 with improvement in INR at 48hrs today  -Continue trending daily INR  -Continue Lovenox bridge

## 2023-09-10 NOTE — ASSESSMENT & PLAN NOTE
Presents with worsening MARTINEZ and chest pain for 1-2 days. States these are similar to symptoms she experienced prior to her previous admission 9/2-9/3 when she presented with dyspnea, CP, and cough and was found to have saddle PE. Also has a history of COPD and MAG, not on home oxygen. She states she primarily becomes short of breath when having long conversations or exerting herself by walking.     Currently not requiring supplemental oxygen, satting well on room air.   - Continue BIPAP @ night  - Plan for desaturation test/home O2 evaluation prior to d/c

## 2023-09-10 NOTE — ASSESSMENT & PLAN NOTE
History of substance abuse with methamphetamine and marijuana with the last use on September 2.   Urine tox screen positive for amphetamines, THC on admission.    -Case management did discuss with the patient opportunities for rehab and cessation

## 2023-09-10 NOTE — CONSULTS
TeleConsultation - [facility] Orestes Mason 47 y.o. female MRN: [id number]  Unit/Bed#: S -01 Encounter: [id number]        REQUIRED DOCUMENTATION:     1. This service was provided via Telemedicine. 2. Provider located at [facility].  3. TeleMed provider: Rosa Quintanilla MD.  4. Identify all parties in room with patient during tele consult: Patient   5. Patient was then informed that this was a Telemedicine visit and that the exam was being conducted confidentially over secure lines. My office door was closed. No one else was in the room. Patient acknowledged consent and understanding of privacy and security of the Telemedicine visit, and gave us permission to have the assistant stay in the room in order to assist with the history and to conduct the exam.  I informed the patient that I have reviewed their record in Epic and presented the opportunity for them to ask any questions regarding the visit today. The patient agreed to participate. Discussed with Nuvia Estes MD    Assessment/Plan     Present on Admission:  • Schizoaffective disorder, bipolar type ([address])  • Sjogren's syndrome ([address])  • Overactive bladder  • MAG (obstructive sleep apnea)  • Hypothyroidism  • COPD (chronic obstructive pulmonary disease) (HCC)  • Acute saddle pulmonary embolism ([facility])  • Acute respiratory failure (HCC)  • Substance abuse ([address])  • Hypertension  • Anxiety    Assessment:    Schizoaffective Disorder, bipolar type     Patient presents with mood lability on exam likely related to cessation of meth as patient also with hypersomnia. Patient symptoms do not be related to mental illness as well as team or is aware of her admission.   Patient though any indicated voluntary psychiatric admission or one-to-one    Treatment Plan:    Planned Medication Changes:    -None     Current Medications:     Current Facility-Administered Medications   Medication Dose Route Frequency Provider Last Rate   • albuterol  2 puff Inhalation Q6H PRN Shawna Cardona MD     • amLODIPine  5 mg Oral Daily Shawan Cardona MD     • buPROPion  450 mg Oral Daily Shawna Cardona MD     • carBAMazepine  300 mg Oral Q12H Shawna Cardona MD     • cholecalciferol  1,000 Units Oral Daily Shawna Cardona MD     • colestipol  1 g Oral BID Sameera Stout MD     • divalproex sodium  750 mg Oral Q12H Sameera Stout MD     • enoxaparin  1 mg/kg Subcutaneous Q12H Aaron Reed DO     • fluticasone  1 puff Inhalation Daily Shawna Cardona MD     • furosemide  20 mg Oral Daily Shawna Cardona MD     • hydrOXYzine HCL  25 mg Oral Q6H PRN Coral Velazquez MD     • levothyroxine  125 mcg Oral Early Morning Shawna Cardona MD     • loperamide  2 mg Oral TID PRN Jyotsna Mitchell MD     • losartan  50 mg Oral Daily Shawna Cardona MD     • lurasidone  60 mg Oral BID Shawna Cardona MD     • memantine  10 mg Oral Daily Shawna Cardona MD     • metoprolol tartrate  37.5 mg Oral Q12H [facility] Sameera Stout MD     • oxybutynin  5 mg Oral BID Shawna Cardona MD     • pilocarpine  5 mg Oral BID Shawna Cardona MD     • rOPINIRole  1 mg Oral HS Shawna Cardona MD     • topiramate  150 mg Oral BID Shawna Cardona MD     • venlafaxine  37.5 mg Oral Daily Shawna Cardona MD     • warfarin  5 mg Oral Daily (warfarin) Yuli Anders DO         Risks / Benefits of Treatment:    Risks, benefits, and possible side effects of medications explained to patient and patient verbalizes understanding.       Other treatment modalities recommended as indicated:    · psychotherapy  · outpatient referral      Inpatient consult to Psychiatry  Consult performed by: Milo Choudhury MD  Consult ordered by: Meme Hamlin MD        Physician Requesting Consult: Sameera Stout MD  Principal Problem:Acute respiratory failure [facility])    Reason for Consult: Psych Evaluation      History of Present Illness      Patient reports that she had a bad call with her ACT team      Psychiatric Review Of Systems:    sleep: yes  appetite changes: yes  weight changes: no  energy/anergy: no  interest/pleasure/anhedonia: no  somatic symptoms: no  anxiety/panic: no  geovany: no  guilty/hopeless: no  self injurious behavior/risky behavior: no    Historical Information     Past Psychiatric History:     Psychiatric Hospitalizations:   • multiple past inpatient psychiatric admissions  Outpatient Treatment History:   • Yes  Suicide Attempts:   • Yes, one attempt by overdose  History of self-harm:   • None  Violence History:   • no  Past Psychiatric medication trials: Multiple     Substance Abuse History: Chronic Methamphetamine Abuse         Family Psychiatric History:  Denied         Social History:    Education: high school diploma/GED  Learning Disabilities: Denied  Marital history: single  Living arrangement, social support: The patient lives in home with parents and extended family. Occupational History: on permanent disability  Functioning Relationships: good support system.   Other Pertinent History: None    Traumatic History:     Abuse: None  Other Traumatic Events: none    Past Medical History:   Diagnosis Date   • Anxiety    • Arthritis     oa cassandra knees   • Asthma     good control- no medications   • Yan's esophagus    • Bipolar affective disorder ([address])    • Cannabis abuse with unspecified 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[address])    • Chronic pain disorder     lumbar   • COPD (chronic obstructive pulmonary disease) (HCC)    • CPAP (continuous positive airway pressure) dependence    • Degenerative disc disease at L5-S1 level    • Deliberate self-cutting     9/15/22per pt has not done recently-- does see a therapist and psychiatrist regularly   • Depression 09/16/2008   • Disease of thyroid gland     hypo   • MARTINEZ (dyspnea on exertion)     per pt "has had this with exertion and not new"   • Drug overdose 10/28/2008    suicide attempt and again drug overdose 7/2022-- [facility] floor and than transferred to [facility]   • Dysphagia    • Dyspnea    • Edema     BLE   • Family history of blood clots    • GERD (gastroesophageal reflux disease)    • Headache(784.0)    • Headache, tension-type    • High blood pressure    • History of COVID-19 12/30/2020    Symptoms started on 12/30/2020 and then got worse. Today she is feeling a little bit better. She is a candidate for monoclonal antibody infusion and set for 1/6/21 @ 1pm at [facility]. 01/07/21 - telemedicine -    • Hyperlipidemia    • Hypertension    • Knee pain, bilateral     Especially right   • Medical marijuana use    • Memory loss    • Migraines    • Obesity    • Overactive bladder    • Sjogren's disease ([address])    • Sleep apnea    • Stress incontinence    • Suicidal ideations    • Teeth missing    • Tremor     per pt Tremors of Right Leg and both Arms   • Visual impairment    • Wears glasses        Medical Review Of Systems:    Review of Systems    Meds/Allergies     all current active meds have been reviewed  Allergies   Allergen Reactions   • Percocet [Oxycodone-Acetaminophen] Headache     Severe headaches   (denies issues with Tylenol)   • Povidone Iodine Rash     Unsure if betadine or gauze post surgical. Got rash on leg.    Has  Had itchy rashes after every surgery prep and IV insertion   • Chlorhexidine Rash     Per pt "able to use the liquid soap--thinkd reaction from the sponges or wipes"       Objective     Vital signs in last 24 hours:  Temp:  [98.4 °F (36.9 °C)] 98.4 °F (36.9 °C)  HR:  [62-69] 67  Resp:  [16-18] 16  BP: (106-144)/(62-94) 106/62    No intake or output data in the 24 hours ending 09/09/23 2020    Mental Status Evaluation:    Appearance:  age appropriate   Behavior:  normal   Speech:  normal pitch and normal volume   Mood:  normal   Affect:  normal   Language: naming objects   Thought Process:  normal   Associations intact associations   Thought Content:  normal   Perceptual Disturbances: None   Risk Potential: Suicidal Ideations none  Homicidal Ideations none  Potential for Aggression No   Sensorium:  person, place and time/date   Cognition:  recent and remote memory grossly intact   Consciousness:  alert    Attention: attention span and concentration were age appropriate   Intellect: within normal limits   Fund of Knowledge: awareness of current events: President   Insight:  limited   Judgment: limited   Muscle Strength:  Muscle Tone: normal NFT  normal   Gait/Station: normal gait/station   Motor Activity: no abnormal movements       Lab Results: I have personally reviewed all pertinent laboratory/tests results. Most Recent Labs:   Lab Results   Component Value Date    WBC 6.44 09/09/2023    RBC 4.89 09/09/2023    HGB 14.9 09/09/2023    HCT 47.1 (H) 09/09/2023     09/09/2023    RDW 13.7 09/09/2023    NEUTROABS 3.48 09/09/2023    SODIUM 139 09/09/2023    K 3.9 09/09/2023     (H) 09/09/2023    CO2 21 09/09/2023    BUN 15 09/09/2023    CREATININE 0.53 (L) 09/09/2023    GLUC 102 09/09/2023    GLUF 88 01/05/2023    CALCIUM 8.8 09/09/2023    AST 16 09/09/2023    ALT 24 09/09/2023    ALKPHOS 52 09/09/2023    TP 6.6 09/09/2023    ALB 3.7 09/09/2023    TBILI 0.25 09/09/2023    CHOLESTEROL 234 (H) 03/09/2023    HDL 71 03/09/2023    TRIG 153 (H) 03/09/2023    LDLCALC 132 (H) 03/09/2023    [address] 163 03/09/2023    VALPROICTOT 59 03/09/2023    AMMONIA 32 03/09/2023    UPB2HXLTQWAR 4.510 (H) 03/15/2023    FREET4 0.99 03/15/2023    T3FREE 2.27 (L) 10/16/2019    PREGSERUM Negative 09/06/2023    RPR Non-Reactive 07/17/2022    HGBA1C 5.0 01/05/2023    EAG 97 01/05/2023       Imaging Studies: Echo follow up/limited w/ contrast if indicated    Result Date: 9/7/2023  Narrative: •  Left Ventricle: The left ventricular ejection fraction is 60%. Systolic function is normal. •  Right Ventricle: Right ventricular cavity size is normal. Systolic function is normal. •  Tricuspid Valve: The right ventricular systolic pressure is mildly elevated.  The estimated right ventricular systolic pressure is 75.54 mmHg. XR chest 1 view portable    Result Date: 9/7/2023  Narrative: CHEST INDICATION:   CP, SOB. COMPARISON: 9/1/2023 EXAM PERFORMED/VIEWS:  XR CHEST PORTABLE FINDINGS: Cardiomediastinal silhouette appears unremarkable. The lungs are clear. No pneumothorax or pleural effusion. Osseous structures appear within normal limits for patient age. Impression: No acute cardiopulmonary disease. Workstation performed: [id number]     CTA ED chest PE study    Result Date: 9/7/2023  Narrative: CTA - CHEST WITH IV CONTRAST - PULMONARY ANGIOGRAM INDICATION:   Chest pain. Shortness of breath. Known pulmonary embolism. COMPARISON: CT of the chest on September 1, 2023. TECHNIQUE: CTA examination of the chest was performed using angiographic technique according to a protocol specifically tailored to evaluate for pulmonary embolism. Multiplanar 2D reformatted images were created from the source data. In addition, coronal 3D MIP postprocessing was performed on the acquisition scanner. Radiation dose length product (DLP) for this visit:  751 mGy-cm . This examination, like all CT scans performed in the [facility], was performed utilizing techniques to minimize radiation dose exposure, including the use of iterative reconstruction and automated exposure control. IV Contrast:  85 mL of iohexol (OMNIPAQUE) FINDINGS: PULMONARY ARTERIAL TREE: There is redemonstrated saddle pulmonary embolism within the main pulmonary arteries extending into bilateral lower lobe lobar to segmental branches. Clot burden is not significantly changed since prior exam. LUNGS: Mild atelectatic changes. No focal consolidation. There is no tracheal or endobronchial lesion. PLEURA: Trace left pleural effusion, new since prior exam. HEART/GREAT VESSELS: There is evidence of right heart strain. No pericardial effusion. No thoracic aortic aneurysm. MEDIASTINUM AND RUEL:  Unremarkable. CHEST WALL AND LOWER NECK:   Unremarkable. VISUALIZED STRUCTURES IN THE UPPER ABDOMEN: Status post cholecystectomy. OSSEOUS STRUCTURES:  No acute fracture or destructive osseous lesion. Impression: Persistent saddle pulmonary embolism extending into the bilateral lower lobar to segmental branches, clot burden is not significantly changed since prior exam. The calculated ratio of right ventricular to left ventricular diameter (RV/LV ratio) is 1.1. There is a critical finding of acute PE with RV/LV ratio >0.9. Based on PERT algorithm recommendations:   Order STAT biomarkers including troponin, NT-BNP or BNP   Calculate PESI score: o  If PESI score falls in I-III, with negative biomarkers, please order a PERT priority ECHO. o  If PESI score falls in IV-V or with positive biomarkers, please order STAT ECHO and alert the campus PERT via [address] at [phone]. The study was marked in [facility] for immediate notification. Workstation performed: [id number]     Echo complete w/ contrast if indicated    Result Date: 9/2/2023  Narrative: •  Left Ventricle: Left ventricular cavity size is normal. Wall thickness is normal. The left ventricular ejection fraction is 60%. Systolic function is normal. Wall motion is normal. Diastolic function is normal. •  Right Ventricle: Right ventricular cavity size is normal. Systolic function is normal. •  Left Atrium: The atrium is mildly dilated. •  Mitral Valve: There is mild regurgitation. •  Tricuspid Valve: There is mild to moderate regurgitation. The estimated right ventricular systolic pressure is 82.79 mmHg. •  Pulmonic Valve: There is mild regurgitation. VAS lower limb venous duplex study, complete bilateral    Result Date: 9/2/2023  Narrative:  [facility] REPORT CLINICAL: Indications: Patient presents with recent discovery of pulmonary embolism and physician wants to determine potential source. Patient reports shortness of breath.  Operative History: Patient denies any cardiovascular procedures Risk Factors The patient has history of HTN and previous smoking (quit 1-5yrs ago). She has no history of Diabetes. FINDINGS:  Segment Left   Impression  FV Dist Non Occlusive Acute  Popliteal Non Occlusive Acute  Peroneal Non Occlusive Acute     CONCLUSION: Impression: RIGHT LOWER LIMB: No evidence of acute or chronic deep vein thrombosis. No evidence of superficial thrombophlebitis noted. Doppler evaluation shows a normal response to augmentation maneuvers. . Popliteal, posterior tibial and anterior tibial arterial Doppler waveform's are triphasic. LEFT LOWER LIMB Acute, non occlusive DVT identified in the distal femoral, popliteal and (one) peroneal veins. No evidence of superficial thrombophlebitis noted. Doppler evaluation shows a normal response to augmentation maneuvers. Popliteal, posterior tibial and anterior tibial arterial Doppler waveform's are triphasic. Technical findings were given to Kmii Boland MD via [employer]. SIGNATURE: Electronically Signed by: Luna Neal MD [facility] Sekou Serranoker on 2023-09-02 12:04:55 PM    XR chest 1 view portable    Result Date: 9/2/2023  Narrative: CHEST INDICATION:   SOB. COMPARISON: 2/10/2023 EXAM PERFORMED/VIEWS:  XR CHEST PORTABLE Single view FINDINGS: Cardiomediastinal silhouette appears unremarkable. The lungs are clear. No pneumothorax or pleural effusion. Osseous structures appear within normal limits for patient age. Impression: No acute cardiopulmonary disease. Findings are stable Workstation performed: [id number]     CTA ED chest PE study    Result Date: 9/1/2023  Narrative: CTA - CHEST WITH IV CONTRAST - PULMONARY ANGIOGRAM INDICATION:   Dyspnea, elevated dimer. COMPARISON: Chest x-ray dated the same TECHNIQUE: CTA examination of the chest was performed using angiographic technique according to a protocol specifically tailored to evaluate for pulmonary embolism.   Multiplanar 2D reformatted images were created from the source data. In addition, coronal 3D MIP postprocessing was performed on the acquisition scanner. Radiation dose length product (DLP) for this visit:  745 mGy-cm . This examination, like all CT scans performed in the [facility], was performed utilizing techniques to minimize radiation dose exposure, including the use of iterative reconstruction and automated exposure control. IV Contrast:  75 mL of iohexol (OMNIPAQUE) FINDINGS: PULMONARY ARTERIAL TREE: Large saddle embolus is seen which extends into the right and left lower lobar and several bilateral lower lobe segmental pulmonary arterial branches. LUNGS: Lungs appear grossly clear. PLEURA:  Unremarkable. HEART/GREAT VESSELS: There is evidence of right heart strain. Mild atherosclerosis; no aortic aneurysm. MEDIASTINUM AND RUEL:  Unremarkable. CHEST WALL AND LOWER NECK:   Unremarkable. VISUALIZED STRUCTURES IN THE UPPER ABDOMEN: Status post cholecystectomy. Subtle lobulated contour of the liver may suggest a component of hepatic cirrhosis. OSSEOUS STRUCTURES:  No acute fracture or destructive osseous lesion. Impression: Large saddle embolus is seen which extends into the right and left lower lobar and several bilateral lower lobe segmental pulmonary arterial branches. There is associated evidence of right heart strain. Subtle lobulated contour of the liver may suggest a component of hepatic cirrhosis. Other findings as above. I personally discussed this study with Dr. Jayro Gamboa in the [facility] ED on 9/1/2023 11:28 PM. Workstation performed: [id number]     XR toe right great min 2 view    Result Date: 8/31/2023  Narrative: 3 view right second toe Indication: Nontraumatic right second toe pain, swelling, and redness. The views of the right second toe do not reveal a fracture or dislocation. Cortex of the bones appear intact with no radiographic evidence for osteomyelitis. Patient has a mild hallux valgus deformity.     Impression: Impression: Unremarkable three-view right second toe. If there is concern for osteomyelitis other imaging study such as MRI is recommended as MRI would be more sensitive and specific compared to plain films. Workstation:[id number]    MRI cervical spine wo contrast    Result Date: 8/29/2023  Narrative: MRI CERVICAL SPINE WITHOUT CONTRAST incomplete examination DUE TO CLAUSTROPHOBIA INDICATION: M54.2: Cervicalgia. COMPARISON:  None. TECHNIQUE:  Multiplanar, multisequence imaging of the cervical spine was performed. . IMAGE QUALITY: Nondiagnostic examination. Only sagittal T2 weighted imaging was able to be obtained and is markedly degraded by patient motion. FINDINGS: Minimal anterior subluxation of C4 in relation to C5. No gross evidence of compression fracture or cord compression. Unfortunately the examination is otherwise nondiagnostic. Impression: Incomplete MRI as patient could only tolerate one sequence which is significantly degraded by patient motion. Recommend patient return with premedication or with anesthesia to complete the examination. The study was marked in EPIC for significant notification. Workstation performed: [id number]     CT abdomen pelvis with contrast    Result Date: 8/22/2023  Narrative: CT ABDOMEN AND PELVIS WITH IV CONTRAST INDICATION:   Abdominal pain, acute, nonlocalized 2 weeks of nausea, generalized abd pain, 1 day of rectal pain and pressure. COMPARISON: CT abdomen pelvis dated October 17, 2008. TECHNIQUE:  CT examination of the abdomen and pelvis was performed. Multiplanar 2D reformatted images were created from the source data. This examination, like all CT scans performed in the [facility], was performed utilizing techniques to minimize radiation dose exposure, including the use of iterative reconstruction and automated exposure control.  Radiation dose length product (DLP) for this visit:  1737 mGy-cm IV Contrast:  100 mL of iohexol (OMNIPAQUE) Enteric Contrast:  Enteric contrast was not administered. FINDINGS: ABDOMEN LOWER CHEST:  No clinically significant abnormality identified in the visualized lower chest. LIVER/BILIARY TREE:  Unremarkable. GALLBLADDER:  Gallbladder is surgically absent. SPLEEN:  Unremarkable. PANCREAS:  Unremarkable. ADRENAL GLANDS:  Unremarkable. KIDNEYS/URETERS:  No hydronephrosis or urinary tract calculus. One or more sharply circumscribed subcentimeter renal hypodensities are present, too small to accurately characterize, and statistically most likely benign findings. According to recent literature (Radiology 2019) no further workup of these findings is recommended. STOMACH AND BOWEL:  Unremarkable. APPENDIX:  A normal appendix was visualized. ABDOMINOPELVIC CAVITY:  No ascites. No pneumoperitoneum. No lymphadenopathy. VESSELS:  Unremarkable for patient's age. PELVIS REPRODUCTIVE ORGANS:  Unremarkable for patient's age. URINARY BLADDER:  Unremarkable. ABDOMINAL WALL/INGUINAL REGIONS:  Unremarkable. OSSEOUS STRUCTURES:  No acute fracture or destructive osseous lesion. Impression: No acute intra-abdominal abnormality. No free air or free fluid. Normal appendix visualized. Workstation performed: [id number]     EKG/Pathology/Other Studies:   Lab Results   Component Value Date    VENTRATE 73 09/06/2023    ATRIALRATE 73 09/06/2023    PRINT 198 09/06/2023    QRSDINT 94 09/06/2023    QTINT 382 09/06/2023    QTCINT 420 09/06/2023    PAXIS 64 09/06/2023    QRSAXIS -16 09/06/2023    TWAVEAXIS 59 09/06/2023        Code Status: Level 1 - Full Code  Advance Directive and Living Will:      Power of :    POLST:      Screenings:    1. Nutrition Screening  Nutrition Assessment (completed by Staff): Nutrition  Feeding: Able to feed self  Diet Type: Regular/House  Appetite: Good    2. Pain Screening  Pain Screening: Pain Assessment  Pain Assessment Tool: 0-10  Pain Score: 0  Pain Location/Orientation: Location: Chest  Multiple Pain Sites: No    3.  Suicide Screening  ED Crisis Suicide Risk Assessment:        Counseling / Coordination of Care: Total floor / unit time spent today 30 minutes. Greater than 50% of total time was spent with the patient and / or family counseling and / or coordination of care. A description of the counseling / coordination of care: Direct Patient Care, Chart Review, and Documentation.

## 2023-09-10 NOTE — ASSESSMENT & PLAN NOTE
Blood Pressure: 123/59    Blood pressure reviewed and acceptable.     Plan  -Continue PTA regimen of amlodipine, Lasix, losartan and metoprolol

## 2023-09-11 LAB
ALBUMIN SERPL BCP-MCNC: 3.7 G/DL (ref 3.5–5)
ALP SERPL-CCNC: 53 U/L (ref 34–104)
ALT SERPL W P-5'-P-CCNC: 50 U/L (ref 7–52)
ANION GAP SERPL CALCULATED.3IONS-SCNC: 8 MMOL/L
AST SERPL W P-5'-P-CCNC: 41 U/L (ref 13–39)
BASOPHILS # BLD AUTO: 0.05 THOUSANDS/ÂΜL (ref 0–0.1)
BASOPHILS NFR BLD AUTO: 1 % (ref 0–1)
BILIRUB SERPL-MCNC: 0.22 MG/DL (ref 0.2–1)
BUN SERPL-MCNC: 16 MG/DL (ref 5–25)
CALCIUM SERPL-MCNC: 8.9 MG/DL (ref 8.4–10.2)
CHLORIDE SERPL-SCNC: 107 MMOL/L (ref 96–108)
CO2 SERPL-SCNC: 25 MMOL/L (ref 21–32)
CREAT SERPL-MCNC: 0.74 MG/DL (ref 0.6–1.3)
EOSINOPHIL # BLD AUTO: 0.06 THOUSAND/ÂΜL (ref 0–0.61)
EOSINOPHIL NFR BLD AUTO: 1 % (ref 0–6)
ERYTHROCYTE [DISTWIDTH] IN BLOOD BY AUTOMATED COUNT: 13.5 % (ref 11.6–15.1)
GFR SERPL CREATININE-BSD FRML MDRD: 94 ML/MIN/1.73SQ M
GLUCOSE SERPL-MCNC: 105 MG/DL (ref 65–140)
HCT VFR BLD AUTO: 46.5 % (ref 34.8–46.1)
HGB BLD-MCNC: 14.8 G/DL (ref 11.5–15.4)
IMM GRANULOCYTES # BLD AUTO: 0.1 THOUSAND/UL (ref 0–0.2)
IMM GRANULOCYTES NFR BLD AUTO: 1 % (ref 0–2)
INR PPP: 1.73 (ref 0.84–1.19)
LYMPHOCYTES # BLD AUTO: 3.24 THOUSANDS/ÂΜL (ref 0.6–4.47)
LYMPHOCYTES NFR BLD AUTO: 36 % (ref 14–44)
MCH RBC QN AUTO: 30.6 PG (ref 26.8–34.3)
MCHC RBC AUTO-ENTMCNC: 31.8 G/DL (ref 31.4–37.4)
MCV RBC AUTO: 96 FL (ref 82–98)
MONOCYTES # BLD AUTO: 1.12 THOUSAND/ÂΜL (ref 0.17–1.22)
MONOCYTES NFR BLD AUTO: 12 % (ref 4–12)
NEUTROPHILS # BLD AUTO: 4.47 THOUSANDS/ÂΜL (ref 1.85–7.62)
NEUTS SEG NFR BLD AUTO: 49 % (ref 43–75)
NRBC BLD AUTO-RTO: 0 /100 WBCS
PLATELET # BLD AUTO: 170 THOUSANDS/UL (ref 149–390)
PMV BLD AUTO: 10.4 FL (ref 8.9–12.7)
POTASSIUM SERPL-SCNC: 4.7 MMOL/L (ref 3.5–5.3)
PROT SERPL-MCNC: 6.9 G/DL (ref 6.4–8.4)
PROTHROMBIN TIME: 21.1 SECONDS (ref 11.6–14.5)
RBC # BLD AUTO: 4.83 MILLION/UL (ref 3.81–5.12)
SODIUM SERPL-SCNC: 140 MMOL/L (ref 135–147)
WBC # BLD AUTO: 9.04 THOUSAND/UL (ref 4.31–10.16)

## 2023-09-11 PROCEDURE — 80053 COMPREHEN METABOLIC PANEL: CPT

## 2023-09-11 PROCEDURE — 99232 SBSQ HOSP IP/OBS MODERATE 35: CPT | Performed by: INTERNAL MEDICINE

## 2023-09-11 PROCEDURE — 85025 COMPLETE CBC W/AUTO DIFF WBC: CPT

## 2023-09-11 PROCEDURE — 85610 PROTHROMBIN TIME: CPT

## 2023-09-11 RX ORDER — WARFARIN SODIUM 6 MG/1
6 TABLET ORAL
Status: DISCONTINUED | OUTPATIENT
Start: 2023-09-11 | End: 2023-09-12 | Stop reason: HOSPADM

## 2023-09-11 RX ADMIN — Medication 1000 UNITS: at 09:36

## 2023-09-11 RX ADMIN — COLESTIPOL HYDROCHLORIDE 1 G: 1 TABLET ORAL at 09:38

## 2023-09-11 RX ADMIN — DIVALPROEX SODIUM 750 MG: 250 TABLET, DELAYED RELEASE ORAL at 20:44

## 2023-09-11 RX ADMIN — TOPIRAMATE 150 MG: 100 TABLET, FILM COATED ORAL at 17:10

## 2023-09-11 RX ADMIN — LOSARTAN POTASSIUM 50 MG: 50 TABLET, FILM COATED ORAL at 09:36

## 2023-09-11 RX ADMIN — OXYBUTYNIN CHLORIDE 5 MG: 5 TABLET ORAL at 17:11

## 2023-09-11 RX ADMIN — METOPROLOL TARTRATE 37.5 MG: 25 TABLET, FILM COATED ORAL at 09:36

## 2023-09-11 RX ADMIN — COLESTIPOL HYDROCHLORIDE 1 G: 1 TABLET ORAL at 21:01

## 2023-09-11 RX ADMIN — ENOXAPARIN SODIUM 135 MG: 150 INJECTION SUBCUTANEOUS at 00:00

## 2023-09-11 RX ADMIN — VENLAFAXINE 37.5 MG: 37.5 TABLET ORAL at 09:37

## 2023-09-11 RX ADMIN — WARFARIN SODIUM 6 MG: 6 TABLET ORAL at 17:11

## 2023-09-11 RX ADMIN — CARBAMAZEPINE 300 MG: 100 TABLET, EXTENDED RELEASE ORAL at 09:39

## 2023-09-11 RX ADMIN — LURASIDONE HYDROCHLORIDE 60 MG: 20 TABLET, COATED ORAL at 09:38

## 2023-09-11 RX ADMIN — DIVALPROEX SODIUM 750 MG: 250 TABLET, DELAYED RELEASE ORAL at 09:38

## 2023-09-11 RX ADMIN — ENOXAPARIN SODIUM 135 MG: 150 INJECTION SUBCUTANEOUS at 21:10

## 2023-09-11 RX ADMIN — TOPIRAMATE 150 MG: 100 TABLET, FILM COATED ORAL at 09:36

## 2023-09-11 RX ADMIN — CARBAMAZEPINE 300 MG: 100 TABLET, EXTENDED RELEASE ORAL at 20:43

## 2023-09-11 RX ADMIN — AMLODIPINE BESYLATE 5 MG: 5 TABLET ORAL at 09:36

## 2023-09-11 RX ADMIN — PILOCARPINE HYDROCHLORIDE 5 MG: 5 TABLET, FILM COATED ORAL at 09:39

## 2023-09-11 RX ADMIN — BUPROPION HYDROCHLORIDE 450 MG: 150 TABLET, FILM COATED, EXTENDED RELEASE ORAL at 09:36

## 2023-09-11 RX ADMIN — OXYBUTYNIN CHLORIDE 5 MG: 5 TABLET ORAL at 09:36

## 2023-09-11 RX ADMIN — DIPHENHYDRAMINE HYDROCHLORIDE, ZINC ACETATE: 2; .1 CREAM TOPICAL at 09:45

## 2023-09-11 RX ADMIN — PILOCARPINE HYDROCHLORIDE 5 MG: 5 TABLET, FILM COATED ORAL at 17:11

## 2023-09-11 RX ADMIN — MEMANTINE 10 MG: 10 TABLET ORAL at 09:36

## 2023-09-11 RX ADMIN — FUROSEMIDE 20 MG: 20 TABLET ORAL at 09:37

## 2023-09-11 RX ADMIN — LURASIDONE HYDROCHLORIDE 60 MG: 20 TABLET, COATED ORAL at 20:45

## 2023-09-11 RX ADMIN — ROPINIROLE HYDROCHLORIDE 1 MG: 1 TABLET, FILM COATED ORAL at 20:42

## 2023-09-11 RX ADMIN — ENOXAPARIN SODIUM 135 MG: 150 INJECTION SUBCUTANEOUS at 12:47

## 2023-09-11 RX ADMIN — FLUTICASONE FUROATE 1 PUFF: 100 POWDER RESPIRATORY (INHALATION) at 09:46

## 2023-09-11 RX ADMIN — METOPROLOL TARTRATE 37.5 MG: 25 TABLET, FILM COATED ORAL at 20:42

## 2023-09-11 NOTE — ASSESSMENT & PLAN NOTE
Patient had a recent admission on September 2 with a large PE associated right heart strain. She was placed on IV heparin and transition to Eliquis on September 3. She returned to the emergency department with dyspnea on exertion with an unremarkable emergency room work-up. Repeat CTA showed persistent saddle pulmonary embolus with an unchanged clot burden. Patient had endorsed compliance with Eliquis since discharge however she was transitioned back to heparin drip. Pharmacy reviewed her medical regimen and felt that there might be a drug drug interaction with carbamazepine decreasing the availability of Eliquis and thus recommended Coumadin. Patient is currently on a Lovenox to Coumadin bridge.      Recent Labs     09/09/23  0617 09/10/23  0913 09/11/23  0517   INR 1.06 1.44* 1.73*     -Increase to coumadin 6 mg daily  -Continue trending daily INR  -Continue Lovenox bridge  -Follow-up with patient's PCP regarding outpatient Coumadin prescription  -Follow-up with Coumadin clinic as needed to determine future prescriptions and monitoring

## 2023-09-11 NOTE — PLAN OF CARE
Problem: PAIN - ADULT  Goal: Verbalizes/displays adequate comfort level or baseline comfort level  Description: Interventions:  - Encourage patient to monitor pain and request assistance  - Assess pain using appropriate pain scale  - Administer analgesics based on type and severity of pain and evaluate response  - Implement non-pharmacological measures as appropriate and evaluate response  - Consider cultural and social influences on pain and pain management  - Notify physician/advanced practitioner if interventions unsuccessful or patient reports new pain  Outcome: Progressing     Problem: INFECTION - ADULT  Goal: Absence or prevention of progression during hospitalization  Description: INTERVENTIONS:  - Assess and monitor for signs and symptoms of infection  - Monitor lab/diagnostic results  - Monitor all insertion sites, i.e. indwelling lines, tubes, and drains  - Monitor endotracheal if appropriate and nasal secretions for changes in amount and color  - [place]et appropriate cooling/warming therapies per order  - Administer medications as ordered  - Instruct and encourage patient and family to use good hand hygiene technique  - Identify and instruct in appropriate isolation precautions for identified infection/condition  Outcome: Progressing  Goal: Absence of fever/infection during neutropenic period  Description: INTERVENTIONS:  - Monitor WBC    Outcome: Progressing     Problem: SAFETY ADULT  Goal: Patient will remain free of falls  Description: INTERVENTIONS:  - Educate patient/family on patient safety including physical limitations  - Instruct patient to call for assistance with activity   - Consult OT/PT to assist with strengthening/mobility   - Keep Call bell within reach  - Keep bed low and locked with side rails adjusted as appropriate  - Keep care items and personal belongings within reach  - Initiate and maintain comfort rounds  - Make Fall Risk Sign visible to staff  - Offer Toileting every 2 Hours, in advance of need  - Initiate/Maintain bed  alarm  - Obtain necessary fall risk management equipment  - Apply yellow socks and bracelet for high fall risk patients  - Consider moving patient to room near nurses station  Outcome: Progressing  Goal: Maintain or return to baseline ADL function  Description: INTERVENTIONS:  -  Assess patient's ability to carry out ADLs; assess patient's baseline for ADL function and identify physical deficits which impact ability to perform ADLs (bathing, care of mouth/teeth, toileting, grooming, dressing, etc.)  - Assess/evaluate cause of self-care deficits   - Assess range of motion  - Assess patient's mobility; develop plan if impaired  - Assess patient's need for assistive devices and provide as appropriate  - Encourage maximum independence but intervene and supervise when necessary  - Involve family in performance of ADLs  - Assess for home care needs following discharge   - Consider OT consult to assist with ADL evaluation and planning for discharge  - Provide patient education as appropriate  Outcome: Progressing  Goal: Maintains/Returns to pre admission functional level  Description: INTERVENTIONS:  - Perform BMAT or MOVE assessment daily.   - Set and communicate daily mobility goal to care team and patient/family/caregiver. - Collaborate with rehabilitation services on mobility goals if consulted  - Perform Range of Motion 3 times a day. - Reposition patient every 2 hours.   - Dangle patient 3 times a day  - Stand patient 3 times a day  - Ambulate patient 3 times a day  - Out of bed to chair 3 times a day   - Out of bed for meals 3 times a day  - Out of bed for toileting  - Record patient progress and toleration of activity level   Outcome: Progressing     Problem: DISCHARGE PLANNING  Goal: Discharge to home or other facility with appropriate resources  Description: INTERVENTIONS:  - Identify barriers to discharge w/patient and caregiver  - Arrange for needed discharge resources and transportation as appropriate  - Identify discharge learning needs (meds, wound care, etc.)  - Arrange for interpretive services to assist at discharge as needed  - Refer to Case Management Department for coordinating discharge planning if the patient needs post-hospital services based on physician/advanced practitioner order or complex needs related to functional status, cognitive ability, or social support system  Outcome: Progressing     Problem: Knowledge Deficit  Goal: Patient/family/caregiver demonstrates understanding of disease process, treatment plan, medications, and discharge instructions  Description: Complete learning assessment and assess knowledge base.   Interventions:  - Provide teaching at level of understanding  - Provide teaching via preferred learning methods  Outcome: Progressing

## 2023-09-11 NOTE — ASSESSMENT & PLAN NOTE
Blood Pressure: 153/78    Blood pressure reviewed and acceptable.     Plan  -Continue PTA regimen of amlodipine, Lasix, losartan and metoprolol

## 2023-09-11 NOTE — PROGRESS NOTES
[facility]  Progress Note  Name: Marshal Roca  MRN: [id number]  Unit/Bed#: S -21 I Date of Admission: 9/6/2023   Date of Service: 9/11/2023 I Hospital Day: 4    Assessment/Plan   * Acute respiratory failure [facility])  Assessment & Plan  Presents with worsening MARTINEZ and chest pain for 1-2 days. States these are similar to symptoms she experienced prior to her previous admission 9/2-9/3 when she presented with dyspnea, CP, and cough and was found to have saddle PE. Also has a history of COPD and MAG, not on home oxygen. She states she primarily becomes short of breath when having long conversations or exerting herself by walking. Currently not requiring supplemental oxygen, satting well on room air.   - Continue BIPAP @ night  - Plan for desaturation test/home O2 evaluation prior to d/c    Acute saddle pulmonary embolism [facility])  Assessment & Plan  Patient had a recent admission on September 2 with a large PE associated right heart strain. She was placed on IV heparin and transition to Eliquis on September 3. She returned to the emergency department with dyspnea on exertion with an unremarkable emergency room work-up. Repeat CTA showed persistent saddle pulmonary embolus with an unchanged clot burden. Patient had endorsed compliance with Eliquis since discharge however she was transitioned back to heparin drip. Pharmacy reviewed her medical regimen and felt that there might be a drug drug interaction with carbamazepine decreasing the availability of Eliquis and thus recommended Coumadin. Patient is currently on a Lovenox to Coumadin bridge.      Recent Labs     09/09/23  0617 09/10/23  0913 09/11/23  0517   INR 1.06 1.44* 1.73*     -Increase to coumadin 6 mg daily  -Continue trending daily INR  -Continue Lovenox bridge  -Follow-up with patient's PCP regarding outpatient Coumadin prescription  -Follow-up with Coumadin clinic as needed to determine future prescriptions and monitoring    Anxiety  Assessment & Plan  Patient noted to be significantly anxious in the room. Additionally, received multiple notifications by RN throughout the day the patient has been anxious requiring multiple doses of as needed Atarax    -Ordered p.o. Atarax ordered 25 mg every 6 hours as needed    Schizoaffective disorder, bipolar type [facility])  Assessment & Plan  History of schizoaffective disorder border with bipolar. Was on a one-to-one on Friday due to concerns of aggressive behavior. This morning required control team as patient was walking in the hallways hitting items on tabletops and calling staff "liars."  She continues to have outbursts when she does not get her way. · Continue home Depakote twice daily  · Continue carbamazepine 300 mg twice daily  · Continue Wellbutrin 450 mg  · Continue home lurasidone and Effexor    Substance abuse ([address])  Assessment & Plan  History of substance abuse with methamphetamine and marijuana with the last use on September 2. Urine tox screen positive for amphetamines, THC on admission.    -Case management did discuss with the patient opportunities for rehab and cessation      COPD (chronic obstructive pulmonary disease) ([address])  Assessment & Plan  Patient is again without wheezes or hypoxia on exam this morning. BiPAP worn overnight. Continue home albuterol as needed as well as Ellipta (substituted for Flovent)    Sjogren's syndrome ([address])  Assessment & Plan  Continue prior to admission pilocarpine    Overactive bladder  Assessment & Plan  PTA Trospium chloride (Sanctura)--nonformulary  Continue with oxybutynin  Currently on [facility]    Hypertension  Assessment & Plan  Blood Pressure: 153/78    Blood pressure reviewed and acceptable.     Plan  -Continue PTA regimen of amlodipine, Lasix, losartan and metoprolol    MAG (obstructive sleep apnea)  Assessment & Plan  Continue BiPAP nocturnal    Hypothyroidism  Assessment & Plan  Continue PTA levothyroxine 125mg qd VTE Pharmacologic Prophylaxis: VTE Score: 13 High Risk (Score >/= 5) - Pharmacological DVT Prophylaxis Ordered: lovenox bridging to warfarin. Sequential Compression Devices Ordered. Patient Centered Rounds: I performed bedside rounds with nursing staff today. Discussions with Specialists or Other Care Team Provider: nursing, control team personnel    Education and Discussions with Family / Patient: Updated  (father) via phone. Current Length of Stay: 4 day(s)  Current Patient Status: Inpatient   Discharge Plan: Anticipate discharge in 24-48 hrs to home. Code Status: Level 1 - Full Code    Subjective:   Patient seen and examined at bedside. Noted to be resting comfortably on initial exam.    During rounds, overhead page for control team was called due to patient walking in the hallways, hitting objects on tabletops outside the elevator. Patient reported that she was upset because her therapist had not come to see her in the hospital as she was told she would. She stated she wanted her AVS team contacted and to have them present at bedside right away. Case management agreed to contact team expeditiously. Patient continued to remain agitated, stating that personnel were "liars" with regard to offers to contact her team and that we were trying to trick her to get her to go back into her room. Patient ultimately was convinced to return to room without further incident at that time. During discussion on rounds in room with provider, patient requested to shower. Patient informed that she could shower so long as one-to-one supervision was provided due to concerns for patient's safety at this time. She was agreeable to this.     Objective:     Vitals:   Temp (24hrs), Av.2 °F (36.8 °C), Min:98 °F (36.7 °C), Max:98.6 °F (37 °C)    Temp:  [98 °F (36.7 °C)-98.6 °F (37 °C)] 98.6 °F (37 °C)  HR:  [66-86] 86  Resp:  [18] 18  BP: (114-153)/(65-78) 153/78  SpO2:  [94 %-96 %] 96 %  Body mass index is 45.9 kg/m². Input and Output Summary (last 24 hours):   No intake or output data in the 24 hours ending 23 1416    Physical Exam:   Physical Exam  Vitals reviewed. Constitutional:       General: She is sleeping. She is not in acute distress. Appearance: She is not toxic-appearing. Comments: Sleeping upon initial entry, awakens to voice and physical touch   HENT:      Head: Normocephalic and atraumatic. Right Ear: External ear normal.      Left Ear: External ear normal.   Eyes:      Extraocular Movements: Extraocular movements intact. Conjunctiva/sclera: Conjunctivae normal.   Cardiovascular:      Rate and Rhythm: Normal rate and regular rhythm. Pulses: Normal pulses. Pulmonary:      Effort: Pulmonary effort is normal. No respiratory distress. Breath sounds: Normal breath sounds. No wheezing, rhonchi or rales. Abdominal:      Palpations: Abdomen is soft. Tenderness: There is no abdominal tenderness. There is no guarding or rebound. Musculoskeletal:         General: Normal range of motion. Right lower le+ Edema present. Left lower le+ Edema present. Skin:     General: Skin is warm and dry. Neurological:      Mental Status: She is oriented to person, place, and time and easily aroused. Psychiatric:         Mood and Affect: Mood is anxious and depressed. Affect is labile.           Additional Data:     Labs:  Results from last 7 days   Lab Units 23  0517   WBC Thousand/uL 9.04   HEMOGLOBIN g/dL 14.8   HEMATOCRIT % 46.5*   PLATELETS Thousands/uL 170   NEUTROS PCT % 49   LYMPHS PCT % 36   MONOS PCT % 12   EOS PCT % 1     Results from last 7 days   Lab Units 23  0517   SODIUM mmol/L 140   POTASSIUM mmol/L 4.7   CHLORIDE mmol/L 107   CO2 mmol/L 25   BUN mg/dL 16   CREATININE mg/dL 0.74   ANION GAP mmol/L 8   CALCIUM mg/dL 8.9   ALBUMIN g/dL 3.7   TOTAL BILIRUBIN mg/dL 0.22   ALK PHOS U/L 53   ALT U/L 50   AST U/L 41*   GLUCOSE RANDOM mg/dL 105     Results from last 7 days   Lab Units 09/11/23  0517   INR  1.73*     Results from last 7 days   Lab Units 09/06/23  2334   POC GLUCOSE mg/dl 107               Lines/Drains:  Invasive Devices     Peripheral Intravenous Line  Duration           Peripheral IV 09/10/23 Left Antecubital <1 day                       Imaging: Reviewed radiology reports from this admission including: chest xray and chest CT scan    Recent Cultures (last 7 days):         Last 24 Hours Medication List:   Current Facility-Administered Medications   Medication Dose Route Frequency Provider Last Rate   • albuterol  2 puff Inhalation Q6H PRN Jeffery Huitron MD     • amLODIPine  5 mg Oral Daily Jeffery Huitron MD     • buPROPion  450 mg Oral Daily Jeffery Huitron MD     • carBAMazepine  300 mg Oral Q12H Jeffery Huitron MD     • cholecalciferol  1,000 Units Oral Daily Jeffery Huitron MD     • colestipol  1 g Oral BID Sarah Reyes MD     • diphenhydrAMINE-zinc acetate   Topical TID PRN Yuli Anders DO     • divalproex sodium  750 mg Oral Q12H Sarah Reyes MD     • enoxaparin  1 mg/kg Subcutaneous Q12H Liam Rosales, DO     • fluticasone  1 puff Inhalation Daily Jeffery Huitron MD     • furosemide  20 mg Oral Daily Jeffery Huitron MD     • hydrOXYzine HCL  25 mg Oral Q6H PRN Ariel Joe MD     • levothyroxine  125 mcg Oral Early Morning Jeffery Huitron MD     • loperamide  2 mg Oral TID PRN Magdalena Thompson MD     • losartan  50 mg Oral Daily Jeffery Huitron MD     • lurasidone  60 mg Oral BID Jeffery Huitron MD     • memantine  10 mg Oral Daily Jeffery Huitron MD     • metoprolol tartrate  37.5 mg Oral Q12H [address] Sarah Reyes MD     • oxybutynin  5 mg Oral BID Jeffery Huitron MD     • pilocarpine  5 mg Oral BID Jeffery Huitron MD     • rOPINIRole  1 mg Oral HS Jeffery Huitron MD     • topiramate  150 mg Oral BID Jeffery Huitron MD     • venlafaxine  37.5 mg Oral Daily Jeffery Huitron MD     • warfarin  6 mg Oral Daily (warfarin) Cat Pike DO          Today, Patient Was Seen By: Cat Pike DO    **Please Note: This note may have been constructed using a voice recognition system. **

## 2023-09-11 NOTE — CASE MANAGEMENT
Case Management Discharge Planning Note    Patient name Anabell Ceja  Location S /S -10 MRN [id number]  : 1970 Date 2023       Current Admission Date: 2023  Current Admission Diagnosis:Acute respiratory failure [facility])   Patient Active Problem List    Diagnosis Date Noted   • Acute saddle pulmonary embolism ([address]) 2023   • Elevated troponin 2023   • Acute respiratory failure ([address]) 2023   • Cervicalgia 08/10/2023   • Medial epicondylitis of right elbow 2023   • Bilateral leg edema 2023   • Chronic migraine without aura without status migrainosus, not intractable 2023   • Chronic eczematous otitis externa of both ears 2023   • Intentional self-harm by unspecified sharp object, sequela ([address]) 2023   • Suicidal deliberate poisoning ([address]) 2022   • Knee pain, right 2022   • Platelets decreased ([address]) 2022   • GERD (gastroesophageal reflux disease) 2022   • Diarrhea 2022   • Ecchymosis 2022   • Anxiety 2022   • Borderline personality disorder ([address]) 2022   • Contusion of elbow 2021   • Cognitive impairment 10/07/2021   • Substance abuse ([address]) 2021   • Potential for cognitive impairment 2021   • Mood disorder ([address]) 2021   • Chronic tension-type headache, intractable 03/10/2021   • Morbid obesity with BMI of 40.0-44.9, adult ([address]) 2021   • History of COVID-19 2020   • Memory difficulties 2020   • BMI 45.0-49.9, adult ([address]) 2020   • Mixed hyperlipidemia 10/30/2019   • COPD (chronic obstructive pulmonary disease) ([address]) 2019   • Major depressive disorder 2019   • Sjogren's syndrome ([address]) 2019   • Primary osteoarthritis of both knees 2018   • MAG (obstructive sleep apnea) 2018   • Medial epicondylitis of elbow, left 2018   • Hemorrhage of rectum and anus 10/02/2017   • Overactive bladder 2017   • Schizoaffective disorder, bipolar type ([address] [facility]) 03/17/2017   • Hypothyroidism 03/17/2017   • Restrictive lung disease 02/01/2017   • Family history of renal cancer 04/26/2016   • Microhematuria 12/23/2015   • Yan's esophagus determined by biopsy 10/19/2015   • Medical clearance for psychiatric admission 09/14/2015   • DDD (degenerative disc disease), lumbar 04/09/2015   • Spondylosis of lumbosacral joint without myelopathy 04/09/2015   • Urinary incontinence 03/31/2015   • Benign essential hypertension 07/02/2014   • Edema 03/26/2014   • Chronic low back pain 04/07/2012   • Hypertension 10/25/2008      LOS (days): 4  Geometric Mean LOS (GMLOS) (days): 3.90  Days to GMLOS:0.1     OBJECTIVE:  Risk of Unplanned Readmission Score: 32.91         Current admission status: Inpatient   Preferred Pharmacy:   [address] [place], [address]  [address] [facility] [address]  Phone: [phone] Fax: [phone] Sil[address]ano Mckeon  ([place] ([place] [address] [address]  Phone: [phone] Fax: [phone]    Primary Care Provider: JHONATAN Mcdowell    Primary Insurance: MEDICARE  Secondary Insurance: [address] [facility] DETAILS:  CM contacted  ACT at . CM spoke with [employer]. CM introduced self and role.  ACT aware that patient is still currently hospitalized and requesting to speak with team member. Mark Warner stated a  from  [facility] will be out today to speak with patient at bedside. CM provided Room location/facility information to Mark Warner. CM spoke with patient and nursing at bedside. CM introduced self and role. Patient updated CM spoke with  ACT [employer].  ACT will be out today to speak with her directly at bedside. Patient expressed understanding. Patient requesting to shower. Nursing updated SLIM already with request.     Charge nurse updated.

## 2023-09-11 NOTE — PROGRESS NOTES
Patient stated that she wants to go home and leave the hospital against medical advice. Patient observed throwing objects across the room. Redirection ineffective. Patient noted to be diaphoreticand shaky while  pacing back and forth in the room and hallway. SLIM and security aware and at bedside. N/o: ativan x1. IV. Medication administered. Patient currently resting in bed. Vs wnl. Safety measures in place. Will continue to monitor.

## 2023-09-11 NOTE — QUICK NOTE
Paged that patient was agitated, getting dressed and threatening to leave AMA due to anxiety, insomnia, palpitations, and sweats. Was informed that pt was also throwing objects at staff. Security and nursing staff was present at bedside upon my arrival.    By the time of my eval she was more calm and asking for help to change out of her gown so that she may leave. I asked her when was the last time she used any substances. She admitted to a history of alcoholism years ago but has been many years sober, however complains of occasional anxiety which is alleviated by ativan. Reports she still uses meth however last used 9 days ago.  Pt amenable to my , evaluation, and offer of one-time dose of ativan to help with her anxiety and lack of sleep.     2 mg IV Ativan x1 ordered at this time

## 2023-09-11 NOTE — ASSESSMENT & PLAN NOTE
History of schizoaffective disorder border with bipolar. Was on a one-to-one on Friday due to concerns of aggressive behavior. This morning required control team as patient was walking in the hallways hitting items on tabletops and calling staff "liars."  She continues to have outbursts when she does not get her way.     · Continue home Depakote twice daily  · Continue carbamazepine 300 mg twice daily  · Continue Wellbutrin 450 mg  · Continue home lurasidone and Effexor

## 2023-09-11 NOTE — PLAN OF CARE
Problem: PAIN - ADULT  Goal: Verbalizes/displays adequate comfort level or baseline comfort level  Description: Interventions:  - Encourage patient to monitor pain and request assistance  - Assess pain using appropriate pain scale  - Administer analgesics based on type and severity of pain and evaluate response  - Implement non-pharmacological measures as appropriate and evaluate response  - Consider cultural and social influences on pain and pain management  - Notify physician/advanced practitioner if interventions unsuccessful or patient reports new pain  Outcome: Progressing     Problem: INFECTION - ADULT  Goal: Absence or prevention of progression during hospitalization  Description: INTERVENTIONS:  - Assess and monitor for signs and symptoms of infection  - Monitor lab/diagnostic results  - Monitor all insertion sites, i.e. indwelling lines, tubes, and drains  - Monitor endotracheal if appropriate and nasal secretions for changes in amount and color  - Jackson appropriate cooling/warming therapies per order  - Administer medications as ordered  - Instruct and encourage patient and family to use good hand hygiene technique  - Identify and instruct in appropriate isolation precautions for identified infection/condition  Outcome: Progressing  Goal: Absence of fever/infection during neutropenic period  Description: INTERVENTIONS:  - Monitor WBC    Outcome: Progressing     Problem: INFECTION - ADULT  Goal: Absence of fever/infection during neutropenic period  Description: INTERVENTIONS:  - Monitor WBC    Outcome: Progressing     Problem: SAFETY ADULT  Goal: Patient will remain free of falls  Description: INTERVENTIONS:  - Educate patient/family on patient safety including physical limitations  - Instruct patient to call for assistance with activity   - Consult OT/PT to assist with strengthening/mobility   - Keep Call bell within reach  - Keep bed low and locked with side rails adjusted as appropriate  - Keep care items and personal belongings within reach  - Initiate and maintain comfort rounds  - Make Fall Risk Sign visible to staff  - Offer Toileting every  Hours, in advance of need  - Initiate/Maintain alarm  - Obtain necessary fall risk management equipment:   - Apply yellow socks and bracelet for high fall risk patients  - Consider moving patient to room near nurses station  Outcome: Progressing  Goal: Maintain or return to baseline ADL function  Description: INTERVENTIONS:  -  Assess patient's ability to carry out ADLs; assess patient's baseline for ADL function and identify physical deficits which impact ability to perform ADLs (bathing, care of mouth/teeth, toileting, grooming, dressing, etc.)  - Assess/evaluate cause of self-care deficits   - Assess range of motion  - Assess patient's mobility; develop plan if impaired  - Assess patient's need for assistive devices and provide as appropriate  - Encourage maximum independence but intervene and supervise when necessary  - Involve family in performance of ADLs  - Assess for home care needs following discharge   - Consider OT consult to assist with ADL evaluation and planning for discharge  - Provide patient education as appropriate  Outcome: Progressing  Goal: Maintains/Returns to pre admission functional level  Description: INTERVENTIONS:  - Perform BMAT or MOVE assessment daily.   - Set and communicate daily mobility goal to care team and patient/family/caregiver. - Collaborate with rehabilitation services on mobility goals if consulted  - Perform Range of Motion times a day. - Reposition patient every hours.   - Dangle patient  times a day  - Stand patient  times a day  - Ambulate patient  times a day  - Out of bed to chair  times a day   - Out of bed for meals  times a day  - Out of bed for toileting  - Record patient progress and toleration of activity level   Outcome: Progressing     Problem: DISCHARGE PLANNING  Goal: Discharge to home or other facility with appropriate resources  Description: INTERVENTIONS:  - Identify barriers to discharge w/patient and caregiver  - Arrange for needed discharge resources and transportation as appropriate  - Identify discharge learning needs (meds, wound care, etc.)  - Arrange for interpretive services to assist at discharge as needed  - Refer to Case Management Department for coordinating discharge planning if the patient needs post-hospital services based on physician/advanced practitioner order or complex needs related to functional status, cognitive ability, or social support system  Outcome: Progressing     Problem: Knowledge Deficit  Goal: Patient/family/caregiver demonstrates understanding of disease process, treatment plan, medications, and discharge instructions  Description: Complete learning assessment and assess knowledge base.   Interventions:  - Provide teaching at level of understanding  - Provide teaching via preferred learning methods  Outcome: Progressing

## 2023-09-12 VITALS
HEART RATE: 60 BPM | OXYGEN SATURATION: 91 % | DIASTOLIC BLOOD PRESSURE: 70 MMHG | SYSTOLIC BLOOD PRESSURE: 131 MMHG | RESPIRATION RATE: 18 BRPM | TEMPERATURE: 97.5 F | HEIGHT: 66 IN | BODY MASS INDEX: 45.71 KG/M2 | WEIGHT: 284.39 LBS

## 2023-09-12 LAB
ALBUMIN SERPL BCP-MCNC: 3.4 G/DL (ref 3.5–5)
ALP SERPL-CCNC: 44 U/L (ref 34–104)
ALT SERPL W P-5'-P-CCNC: 51 U/L (ref 7–52)
ANION GAP SERPL CALCULATED.3IONS-SCNC: 8 MMOL/L
AST SERPL W P-5'-P-CCNC: 32 U/L (ref 13–39)
BILIRUB SERPL-MCNC: 0.2 MG/DL (ref 0.2–1)
BUN SERPL-MCNC: 13 MG/DL (ref 5–25)
CALCIUM ALBUM COR SERPL-MCNC: 8.9 MG/DL (ref 8.3–10.1)
CALCIUM SERPL-MCNC: 8.4 MG/DL (ref 8.4–10.2)
CHLORIDE SERPL-SCNC: 107 MMOL/L (ref 96–108)
CO2 SERPL-SCNC: 24 MMOL/L (ref 21–32)
CREAT SERPL-MCNC: 0.65 MG/DL (ref 0.6–1.3)
GFR SERPL CREATININE-BSD FRML MDRD: 103 ML/MIN/1.73SQ M
GLUCOSE SERPL-MCNC: 93 MG/DL (ref 65–140)
INR PPP: 2.22 (ref 0.84–1.19)
POTASSIUM SERPL-SCNC: 4.1 MMOL/L (ref 3.5–5.3)
PROT SERPL-MCNC: 6.1 G/DL (ref 6.4–8.4)
PROTHROMBIN TIME: 25.5 SECONDS (ref 11.6–14.5)
SODIUM SERPL-SCNC: 139 MMOL/L (ref 135–147)

## 2023-09-12 PROCEDURE — 99238 HOSP IP/OBS DSCHRG MGMT 30/<: CPT | Performed by: FAMILY MEDICINE

## 2023-09-12 PROCEDURE — 85610 PROTHROMBIN TIME: CPT

## 2023-09-12 PROCEDURE — 80053 COMPREHEN METABOLIC PANEL: CPT

## 2023-09-12 RX ORDER — WARFARIN SODIUM 6 MG/1
TABLET ORAL
Qty: 90 TABLET | Refills: 0 | Status: SHIPPED | OUTPATIENT
Start: 2023-09-12

## 2023-09-12 RX ADMIN — DIVALPROEX SODIUM 750 MG: 250 TABLET, DELAYED RELEASE ORAL at 09:35

## 2023-09-12 RX ADMIN — COLESTIPOL HYDROCHLORIDE 1 G: 1 TABLET ORAL at 09:34

## 2023-09-12 RX ADMIN — MEMANTINE 10 MG: 10 TABLET ORAL at 09:35

## 2023-09-12 RX ADMIN — METOPROLOL TARTRATE 37.5 MG: 25 TABLET, FILM COATED ORAL at 09:35

## 2023-09-12 RX ADMIN — OXYBUTYNIN CHLORIDE 5 MG: 5 TABLET ORAL at 09:35

## 2023-09-12 RX ADMIN — TOPIRAMATE 150 MG: 100 TABLET, FILM COATED ORAL at 09:35

## 2023-09-12 RX ADMIN — ENOXAPARIN SODIUM 135 MG: 150 INJECTION SUBCUTANEOUS at 10:11

## 2023-09-12 RX ADMIN — LOSARTAN POTASSIUM 50 MG: 50 TABLET, FILM COATED ORAL at 09:34

## 2023-09-12 RX ADMIN — WARFARIN SODIUM 6 MG: 6 TABLET ORAL at 14:01

## 2023-09-12 RX ADMIN — LEVOTHYROXINE SODIUM 125 MCG: 125 TABLET ORAL at 06:35

## 2023-09-12 RX ADMIN — PILOCARPINE HYDROCHLORIDE 5 MG: 5 TABLET, FILM COATED ORAL at 09:36

## 2023-09-12 RX ADMIN — VENLAFAXINE 37.5 MG: 37.5 TABLET ORAL at 09:35

## 2023-09-12 RX ADMIN — CARBAMAZEPINE 300 MG: 100 TABLET, EXTENDED RELEASE ORAL at 10:11

## 2023-09-12 RX ADMIN — FLUTICASONE FUROATE 1 PUFF: 100 POWDER RESPIRATORY (INHALATION) at 09:36

## 2023-09-12 RX ADMIN — AMLODIPINE BESYLATE 5 MG: 5 TABLET ORAL at 09:35

## 2023-09-12 RX ADMIN — FUROSEMIDE 20 MG: 20 TABLET ORAL at 09:34

## 2023-09-12 RX ADMIN — LURASIDONE HYDROCHLORIDE 60 MG: 20 TABLET, COATED ORAL at 09:34

## 2023-09-12 RX ADMIN — Medication 1000 UNITS: at 09:34

## 2023-09-12 RX ADMIN — BUPROPION HYDROCHLORIDE 450 MG: 150 TABLET, FILM COATED, EXTENDED RELEASE ORAL at 09:34

## 2023-09-12 NOTE — ASSESSMENT & PLAN NOTE
History of schizoaffective disorder border with bipolar. Was on a one-to-one on Friday due to concerns of aggressive behavior. Yesterday morning required control team as patient was walking in the hallways hitting items on tabletops and calling staff "liars."  She continues to have outbursts when she does not get her way.     · Continue home Depakote twice daily  · Continue carbamazepine 300 mg twice daily  · Continue Wellbutrin 450 mg  · Continue home lurasidone and Effexor

## 2023-09-12 NOTE — DISCHARGE SUMMARY
[address] [facility]  Discharge- Nory Pill 1970, 47 y.o. female MRN: [id number]  Unit/Bed#: S -01 Encounter: [id number]  Primary Care Provider: JHONATAN Hu   Date and time admitted to hospital: 9/6/2023 10:42 PM    * Acute respiratory failure [facility])  Assessment & Plan  Presents with worsening MARTINEZ and chest pain for 1-2 days. States these are similar to symptoms she experienced prior to her previous admission 9/2-9/3 when she presented with dyspnea, CP, and cough and was found to have saddle PE. Also has a history of COPD and MAG, not on home oxygen. She states she primarily becomes short of breath when having long conversations or exerting herself by walking. Currently not requiring supplemental oxygen, satting well on room air.   - Continue BIPAP @ night following discharge    Acute saddle pulmonary embolism [facility])  Assessment & Plan  Patient had a recent admission on September 2 with a large PE associated right heart strain. She was placed on IV heparin and transition to Eliquis on September 3. She returned to the emergency department with dyspnea on exertion with an unremarkable emergency room work-up. Repeat CTA showed persistent saddle pulmonary embolus with an unchanged clot burden. Patient had endorsed compliance with Eliquis since discharge however she was transitioned back to heparin drip. Pharmacy reviewed her medical regimen and felt that there might be a drug drug interaction with carbamazepine decreasing the availability of Eliquis and thus recommended Coumadin. Patient bridged from therapeutic Lovenox to Coumadin. Recent Labs     09/10/23  0913 09/11/23  0517 09/12/23  0633   INR 1.44* 1.73* 2.22*     Patient will need frequent monitoring of PT/INR due to known drug reaction between carbamazepine and warfarin.     -D/c on coumadin 6 mg daily  -F/u repeat INR within 48hrs of discharge  -Ambulatory referral to hematology/oncology clinic placed to continue to monitor patient's PT/INR and assist with coumadin prescription; patient reports her PCP does not prescribe her blood thinners      Anxiety  Assessment & Plan  Patient noted to be significantly anxious in the room and required multiple doses of PRN Atarax throughout hospitalization. She also became somewhat paranoid, requiring control team to be called one day following leaving her room and stating that people were "lying to her" to get her back into her room. She had multiple outbursts throughout her stay and was reported to be throwing objects at staff, breaking dishware and glassware in the hallway, and was personally observed by me to be thrashing in bed and kicking items into the floor and attempting to break display holders for brochures by the elevator when agitated. -Recommend follow up with psychiatry for medication management as needed      Schizoaffective disorder, bipolar type [facility])  Assessment & Plan  History of schizoaffective disorder border with bipolar. Was on a one-to-one on Friday due to concerns of aggressive behavior. Yesterday morning required control team as patient was walking in the hallways hitting items on tabletops and calling staff "liars."  She continues to have outbursts when she does not get her way. · Continue home Depakote twice daily  · Continue carbamazepine 300 mg twice daily  · Continue Wellbutrin 450 mg  · Continue home lurasidone and Effexor    Substance abuse ([facility])  Assessment & Plan  History of substance abuse with methamphetamine and marijuana with the last use on September 2. Urine tox screen positive for amphetamines, THC on admission.    -Case management did discuss with the patient opportunities for rehab and cessation      COPD (chronic obstructive pulmonary disease) ([address] )  Assessment & Plan  Patient compliant with BiPAP overnight.   She was not noted to have wheezes or shortness of breath during any of my examinations while in the hospital.    -Continue BiPAP nightly  -Continue home albuterol as needed as well as PTA Ellipta     Sjogren's syndrome (HCC)  Assessment & Plan  Continue PTA pilocarpine    Overactive bladder  Assessment & Plan  Resume PTA trospium chloride (Sanctura) 20mg BID    Hypertension  Assessment & Plan  Blood Pressure: 131/70    Blood pressure reviewed and acceptable. Plan  -Continue PTA regimen of amlodipine, Lasix, losartan and metoprolol    MAG (obstructive sleep apnea)  Assessment & Plan  Continue BiPAP nightly    Hypothyroidism  Assessment & Plan  Continue PTA levothyroxine 125mg qd        Medical Problems     Resolved Problems  Date Reviewed: 9/12/2023          Resolved    Non-sustained ventricular tachycardia ([address]) 9/10/2023     Resolved by  Jase Rodriguez DO        Discharging Resident: Jase Rodriguez DO  Discharging Attending: Marianna Gar MD  PCP: JHONATAN William  Admission Date:   Admission Orders (From admission, onward)     Ordered        09/07/23 1528  Inpatient Admission  Once            09/07/23 0242  Place in Observation  Once                      Discharge Date: 09/12/23    Consultations During Hospital Stay:  · Behavioral health    Procedures Performed:   · None    Significant Findings / Test Results:   XR chest 1 view portable    Result Date: 9/7/2023  Impression: No acute cardiopulmonary disease. Workstation performed: [id number]     CTA ED chest PE study    Result Date: 9/7/2023  Impression: Persistent saddle pulmonary embolism extending into the bilateral lower lobar to segmental branches, clot burden is not significantly changed since prior exam. The calculated ratio of right ventricular to left ventricular diameter (RV/LV ratio) is 1.1. There is a critical finding of acute PE with RV/LV ratio >0.9.  Based on PERT algorithm recommendations:   Order STAT biomarkers including troponin, NT-BNP or BNP   Calculate PESI score: o  If PESI score falls in I-III, with negative biomarkers, please order a PERT priority ECHO. o  If PESI score falls in IV-V or with positive biomarkers, please order STAT ECHO and alert the campus PERT via [address] at [phone]. The study was marked in [facility] for immediate notification. Workstation performed: [id number]     Incidental Findings:   · None     Test Results Pending at Discharge (will require follow up): · None     Outpatient Tests Requested:  · Follow-up PT/INR in 48 hours    Complications: None    Reason for Admission: Shortness of breath    Hospital Course:   Guy Falcon is a 47 y.o. female patient who originally presented to the hospital on 9/6/2023 due to shortness of breath which she states was similar to that experienced prior to her last admission, during which she was diagnosed with saddle pulmonary embolus. She underwent CT scan in the ED on 9/7 and was found to have PE unchanged from last scan on 9/1. She was initially placed on heparin drip for this. However, when attempting to change from heparin drip back to her prior prescribed Eliquis at discharge, our team was notified by pharmacy of significant drug-drug interaction between Eliquis and carbamazepine and recommended warfarin alternative due to ability to monitor . She was started on warfarin 5 mg daily and then up-titrated to 6 mg daily warfarin to achieve therapeutic PTT/INR. Patient reported her primary care physician is unable to prescribe blood thinners for her; for this reason, an ambulatory referral to hematologist was placed on discharge along with a repeat PT/INR ordered for her to perform in approximately 48 hours. Of note, she did have multiple outbursts each day during hospitalization during which she would become destructive of property and throw items at staff members. Control team was required during morning hours on 9/11 due to patient's refusal to remain in room and agitation leading to outburst in the hallway.   Patient was noted to be apologetic following these events, stating "I have a bad temper, don't I?"  However, she appeared to have poor insight at the time of these events and continued to have outbursts whenever she does not get her way or when things were changed without her warning, e.g. when dispo planning required alteration or when breakfast delivered to her room was missing an item ordered. On day of discharge, her INR was found to be therapeutic between goal range of 2 and 3. She is stable for discharge at this time with close follow-up with hematology and PCP for additional PT/INR monitoring and titration of warfarin dose as needed. Please see above list of diagnoses and related plan for additional information. One-to-one staff monitoring is still in place at the time of my visit this morning. Condition at Discharge: stable    Discharge Day Visit / Exam:   Subjective:  Evaluated at bedside. She is noted to be eating breakfast without any acute agitation and does not have any acute concerns at this time. Vitals: Blood Pressure: 131/70 (09/12/23 0900)  Pulse: 60 (09/12/23 0900)  Temperature: 97.5 °F (36.4 °C) (09/12/23 0900)  Temp Source: Oral (09/12/23 0900)  Respirations: 18 (09/12/23 0900)  Height: 5' 6" (167.6 cm) (09/07/23 1310)  Weight - Scale: 129 kg (284 lb 6.3 oz) (09/07/23 1310)  SpO2: 91 % (09/12/23 0900)  Exam:   Physical Exam  Vitals reviewed. Constitutional:       General: She is not in acute distress. Appearance: She is not ill-appearing, toxic-appearing or diaphoretic. Comments: Sitting upright in bed, eating breakfast   HENT:      Head: Normocephalic and atraumatic. Right Ear: External ear normal.      Left Ear: External ear normal.   Eyes:      Extraocular Movements: Extraocular movements intact. Conjunctiva/sclera: Conjunctivae normal.   Cardiovascular:      Rate and Rhythm: Normal rate and regular rhythm. Pulses: Normal pulses.    Pulmonary:      Effort: Pulmonary effort is normal. No respiratory distress. Breath sounds: Normal breath sounds. No wheezing, rhonchi or rales. Abdominal:      Palpations: Abdomen is soft. Tenderness: There is no abdominal tenderness. There is no guarding or rebound. Musculoskeletal:         General: Normal range of motion. Right lower le+ Edema present. Left lower le+ Edema present. Skin:     General: Skin is warm and dry. Neurological:      Mental Status: She is alert, oriented to person, place, and time and easily aroused. Psychiatric:         Mood and Affect: Mood is anxious and depressed. Affect is labile. Discharge instructions/Information to patient and family:   See after visit summary for information provided to patient and family. Provisions for Follow-Up Care:  See after visit summary for information related to follow-up care and any pertinent home health orders. Disposition:   Home    Planned Readmission: none    Discharge Medications:  See after visit summary for reconciled discharge medications provided to patient and/or family.       **Please Note: This note may have been constructed using a voice recognition system**

## 2023-09-12 NOTE — ASSESSMENT & PLAN NOTE
Patient had a recent admission on September 2 with a large PE associated right heart strain. She was placed on IV heparin and transition to Eliquis on September 3. She returned to the emergency department with dyspnea on exertion with an unremarkable emergency room work-up. Repeat CTA showed persistent saddle pulmonary embolus with an unchanged clot burden. Patient had endorsed compliance with Eliquis since discharge however she was transitioned back to heparin drip. Pharmacy reviewed her medical regimen and felt that there might be a drug drug interaction with carbamazepine decreasing the availability of Eliquis and thus recommended Coumadin. Patient bridged from therapeutic Lovenox to Coumadin. Recent Labs     09/10/23  0913 09/11/23  0517 09/12/23  0633   INR 1.44* 1.73* 2.22*     Patient will need frequent monitoring of PT/INR due to known drug reaction between carbamazepine and warfarin.     -D/c on coumadin 6 mg daily  -F/u repeat INR within 48hrs of discharge  -Ambulatory referral to hematology/oncology clinic placed to continue to monitor patient's PT/INR and assist with coumadin prescription; patient reports her PCP does not prescribe her blood thinners

## 2023-09-12 NOTE — DISCHARGE INSTR - AVS FIRST PAGE
Dear Hipolito Rodas,     It was our pleasure to care for you here at [facility]. It is our hope that we were always able to exceed the expected standards for your care during your stay. You were hospitalized due to shortness of breath. You were cared for on the [facility] floor by Amandeep Matias DO under the service of Indira Florez MD with the [facility] who covers for your primary care physician (PCP), JHONATAN Etienne, while you were hospitalized. If you have any questions or concerns related to this hospitalization, you may contact us at [phone]. For follow up as well as any medication refills, we recommend that you follow up with your primary care physician. A registered nurse will reach out to you by phone within a few days after your discharge to answer any additional questions that you may have after going home. However, at this time we provide for you here, the most important instructions / recommendations at discharge:     Notable Medication Adjustments -   STOP taking apixaban (Eliquis) at this time  START taking warfarin (Coumadin) 6 mg daily   Testing Required after Discharge -   Lab test: PT/INR. Follow-up with hematology to have this scheduled after the first test, which you will need to have done in 2 days. Important follow up information -   Follow-up with hematology regarding your ongoing blood thinners. A referral has been placed in your behalf. Please review this entire after visit summary as additional general instructions including medication list, appointments, activity, diet, any pertinent wound care, and other additional recommendations from your care team that may be provided for you.       Sincerely,     Amandeep Matias DO

## 2023-09-12 NOTE — ASSESSMENT & PLAN NOTE
Patient noted to be significantly anxious in the room and required multiple doses of PRN Atarax throughout hospitalization. She also became somewhat paranoid, requiring control team to be called one day following leaving her room and stating that people were "lying to her" to get her back into her room. She had multiple outbursts throughout her stay and was reported to be throwing objects at staff, breaking dishware and glassware in the hallway, and was personally observed by me to be thrashing in bed and kicking items into the floor and attempting to break display holders for brochures by the elevator when agitated.     -Recommend follow up with psychiatry for medication management as needed

## 2023-09-12 NOTE — ASSESSMENT & PLAN NOTE
Blood Pressure: 131/70    Blood pressure reviewed and acceptable.     Plan  -Continue PTA regimen of amlodipine, Lasix, losartan and metoprolol

## 2023-09-12 NOTE — ASSESSMENT & PLAN NOTE
Patient compliant with BiPAP overnight.   She was not noted to have wheezes or shortness of breath during any of my examinations while in the hospital.    -Continue BiPAP nightly  -Continue home albuterol as needed as well as PTA Ellipta

## 2023-09-12 NOTE — ASSESSMENT & PLAN NOTE
Presents with worsening MARTINEZ and chest pain for 1-2 days. States these are similar to symptoms she experienced prior to her previous admission 9/2-9/3 when she presented with dyspnea, CP, and cough and was found to have saddle PE. Also has a history of COPD and MAG, not on home oxygen. She states she primarily becomes short of breath when having long conversations or exerting herself by walking.     Currently not requiring supplemental oxygen, satting well on room air.   - Continue BIPAP @ night following discharge

## 2023-09-13 ENCOUNTER — TELEPHONE (OUTPATIENT)
Dept: HEMATOLOGY ONCOLOGY | Facility: CLINIC | Age: 53
End: 2023-09-13

## 2023-09-13 DIAGNOSIS — I10 HYPERTENSION, UNSPECIFIED TYPE: ICD-10-CM

## 2023-09-13 NOTE — TELEPHONE ENCOUNTER
I called Leonor Richards in response to a referral that was received for patient to establish care with Hematology. Outreach was made to schedule a consultation. A consultation was scheduled for patient during this call.  Patient is scheduled on 10/5/23 at 9:00Am with Diana Camara at the [facility]

## 2023-09-14 ENCOUNTER — PATIENT OUTREACH (OUTPATIENT)
Dept: FAMILY MEDICINE CLINIC | Facility: CLINIC | Age: 53
End: 2023-09-14

## 2023-09-14 ENCOUNTER — TRANSITIONAL CARE MANAGEMENT (OUTPATIENT)
Dept: FAMILY MEDICINE CLINIC | Facility: CLINIC | Age: 53
End: 2023-09-14

## 2023-09-14 ENCOUNTER — APPOINTMENT (OUTPATIENT)
Dept: LAB | Facility: CLINIC | Age: 53
End: 2023-09-14
Payer: MEDICARE

## 2023-09-14 DIAGNOSIS — A09 DIARRHEA OF INFECTIOUS ORIGIN: ICD-10-CM

## 2023-09-14 DIAGNOSIS — E03.9 ACQUIRED HYPOTHYROIDISM: Chronic | ICD-10-CM

## 2023-09-14 DIAGNOSIS — I26.92 SADDLE EMBOLISM OF PULMONARY ARTERY (HCC): ICD-10-CM

## 2023-09-14 LAB
INR PPP: 2.19 (ref 0.84–1.19)
PROTHROMBIN TIME: 24.6 SECONDS (ref 11.6–14.5)

## 2023-09-14 PROCEDURE — 85610 PROTHROMBIN TIME: CPT

## 2023-09-14 PROCEDURE — 36415 COLL VENOUS BLD VENIPUNCTURE: CPT

## 2023-09-15 ENCOUNTER — APPOINTMENT (EMERGENCY)
Dept: RADIOLOGY | Facility: HOSPITAL | Age: 53
End: 2023-09-15
Payer: MEDICARE

## 2023-09-15 ENCOUNTER — HOSPITAL ENCOUNTER (EMERGENCY)
Facility: HOSPITAL | Age: 53
Discharge: HOME | End: 2023-09-15
Attending: EMERGENCY MEDICINE
Payer: MEDICARE

## 2023-09-15 ENCOUNTER — NURSE TRIAGE (OUTPATIENT)
Dept: OTHER | Facility: OTHER | Age: 53
End: 2023-09-15

## 2023-09-15 ENCOUNTER — OFFICE VISIT (OUTPATIENT)
Dept: FAMILY MEDICINE CLINIC | Facility: CLINIC | Age: 53
End: 2023-09-15
Payer: MEDICARE

## 2023-09-15 VITALS
RESPIRATION RATE: 16 BRPM | TEMPERATURE: 98 F | DIASTOLIC BLOOD PRESSURE: 92 MMHG | OXYGEN SATURATION: 91 % | SYSTOLIC BLOOD PRESSURE: 159 MMHG | HEART RATE: 74 BPM

## 2023-09-15 VITALS
SYSTOLIC BLOOD PRESSURE: 116 MMHG | DIASTOLIC BLOOD PRESSURE: 80 MMHG | HEIGHT: 66 IN | WEIGHT: 288 LBS | HEART RATE: 75 BPM | BODY MASS INDEX: 46.28 KG/M2 | OXYGEN SATURATION: 94 % | TEMPERATURE: 97.7 F

## 2023-09-15 DIAGNOSIS — L25.9 CONTACT DERMATITIS AND ECZEMA: ICD-10-CM

## 2023-09-15 DIAGNOSIS — F41.9 ANXIETY: ICD-10-CM

## 2023-09-15 DIAGNOSIS — J44.9 CHRONIC OBSTRUCTIVE PULMONARY DISEASE, UNSPECIFIED COPD TYPE (HCC): ICD-10-CM

## 2023-09-15 DIAGNOSIS — R07.9 CHEST PAIN: Primary | ICD-10-CM

## 2023-09-15 DIAGNOSIS — I26.92 ACUTE SADDLE PULMONARY EMBOLISM, UNSPECIFIED WHETHER ACUTE COR PULMONALE PRESENT (HCC): Primary | ICD-10-CM

## 2023-09-15 LAB
ALBUMIN SERPL BCP-MCNC: 3.8 G/DL (ref 3.5–5)
ALP SERPL-CCNC: 49 U/L (ref 34–104)
ALT SERPL W P-5'-P-CCNC: 47 U/L (ref 7–52)
ANION GAP SERPL CALCULATED.3IONS-SCNC: 10 MMOL/L
AST SERPL W P-5'-P-CCNC: 21 U/L (ref 13–39)
ATRIAL RATE: 73 BPM
BASOPHILS # BLD AUTO: 0.06 THOUSANDS/ÂΜL (ref 0–0.1)
BASOPHILS NFR BLD AUTO: 1 % (ref 0–1)
BILIRUB SERPL-MCNC: 0.19 MG/DL (ref 0.2–1)
BUN SERPL-MCNC: 16 MG/DL (ref 5–25)
CALCIUM SERPL-MCNC: 9.1 MG/DL (ref 8.4–10.2)
CARDIAC TROPONIN I PNL SERPL HS: 3 NG/L
CHLORIDE SERPL-SCNC: 108 MMOL/L (ref 96–108)
CO2 SERPL-SCNC: 23 MMOL/L (ref 21–32)
CREAT SERPL-MCNC: 0.73 MG/DL (ref 0.6–1.3)
EOSINOPHIL # BLD AUTO: 0.1 THOUSAND/ÂΜL (ref 0–0.61)
EOSINOPHIL NFR BLD AUTO: 1 % (ref 0–6)
ERYTHROCYTE [DISTWIDTH] IN BLOOD BY AUTOMATED COUNT: 13.6 % (ref 11.6–15.1)
GFR SERPL CREATININE-BSD FRML MDRD: 95 ML/MIN/1.73SQ M
GLUCOSE SERPL-MCNC: 111 MG/DL (ref 65–140)
HCT VFR BLD AUTO: 46.6 % (ref 34.8–46.1)
HGB BLD-MCNC: 14.6 G/DL (ref 11.5–15.4)
IMM GRANULOCYTES # BLD AUTO: 0.16 THOUSAND/UL (ref 0–0.2)
IMM GRANULOCYTES NFR BLD AUTO: 2 % (ref 0–2)
LYMPHOCYTES # BLD AUTO: 3.04 THOUSANDS/ÂΜL (ref 0.6–4.47)
LYMPHOCYTES NFR BLD AUTO: 39 % (ref 14–44)
MCH RBC QN AUTO: 30.3 PG (ref 26.8–34.3)
MCHC RBC AUTO-ENTMCNC: 31.3 G/DL (ref 31.4–37.4)
MCV RBC AUTO: 97 FL (ref 82–98)
MONOCYTES # BLD AUTO: 0.9 THOUSAND/ÂΜL (ref 0.17–1.22)
MONOCYTES NFR BLD AUTO: 11 % (ref 4–12)
NEUTROPHILS # BLD AUTO: 3.64 THOUSANDS/ÂΜL (ref 1.85–7.62)
NEUTS SEG NFR BLD AUTO: 46 % (ref 43–75)
NRBC BLD AUTO-RTO: 0 /100 WBCS
P AXIS: 59 DEGREES
PLATELET # BLD AUTO: 152 THOUSANDS/UL (ref 149–390)
PMV BLD AUTO: 10.4 FL (ref 8.9–12.7)
POTASSIUM SERPL-SCNC: 4.1 MMOL/L (ref 3.5–5.3)
PR INTERVAL: 190 MS
PROT SERPL-MCNC: 6.9 G/DL (ref 6.4–8.4)
QRS AXIS: -5 DEGREES
QRSD INTERVAL: 94 MS
QT INTERVAL: 388 MS
QTC INTERVAL: 427 MS
RBC # BLD AUTO: 4.82 MILLION/UL (ref 3.81–5.12)
SODIUM SERPL-SCNC: 141 MMOL/L (ref 135–147)
T WAVE AXIS: 67 DEGREES
VENTRICULAR RATE: 73 BPM
WBC # BLD AUTO: 7.9 THOUSAND/UL (ref 4.31–10.16)

## 2023-09-15 PROCEDURE — 85025 COMPLETE CBC W/AUTO DIFF WBC: CPT | Performed by: EMERGENCY MEDICINE

## 2023-09-15 PROCEDURE — 80053 COMPREHEN METABOLIC PANEL: CPT | Performed by: EMERGENCY MEDICINE

## 2023-09-15 PROCEDURE — 99285 EMERGENCY DEPT VISIT HI MDM: CPT

## 2023-09-15 PROCEDURE — 93010 ELECTROCARDIOGRAM REPORT: CPT | Performed by: INTERNAL MEDICINE

## 2023-09-15 PROCEDURE — 71046 X-RAY EXAM CHEST 2 VIEWS: CPT

## 2023-09-15 PROCEDURE — 36415 COLL VENOUS BLD VENIPUNCTURE: CPT

## 2023-09-15 PROCEDURE — 99214 OFFICE O/P EST MOD 30 MIN: CPT | Performed by: FAMILY MEDICINE

## 2023-09-15 PROCEDURE — 84484 ASSAY OF TROPONIN QUANT: CPT | Performed by: EMERGENCY MEDICINE

## 2023-09-15 PROCEDURE — 93005 ELECTROCARDIOGRAM TRACING: CPT

## 2023-09-15 PROCEDURE — 99285 EMERGENCY DEPT VISIT HI MDM: CPT | Performed by: EMERGENCY MEDICINE

## 2023-09-15 RX ORDER — ALBUTEROL SULFATE 90 UG/1
2 AEROSOL, METERED RESPIRATORY (INHALATION) EVERY 6 HOURS PRN
Qty: 18 G | Refills: 5 | Status: SHIPPED | OUTPATIENT
Start: 2023-09-15

## 2023-09-15 RX ORDER — HYDROXYZINE 50 MG/1
50 TABLET, FILM COATED ORAL
Qty: 10 TABLET | Refills: 0 | Status: SHIPPED | OUTPATIENT
Start: 2023-09-15

## 2023-09-15 RX ORDER — LORAZEPAM 1 MG/1
1 TABLET ORAL ONCE
Status: COMPLETED | OUTPATIENT
Start: 2023-09-15 | End: 2023-09-15

## 2023-09-15 RX ORDER — PREDNISONE 20 MG/1
40 TABLET ORAL DAILY
Qty: 6 TABLET | Refills: 0 | Status: SHIPPED | OUTPATIENT
Start: 2023-09-15 | End: 2023-09-18

## 2023-09-15 RX ADMIN — LORAZEPAM 1 MG: 1 TABLET ORAL at 08:17

## 2023-09-15 NOTE — ED ATTENDING ATTESTATION
9/15/2023  I, Vicky Angelo MD, saw and evaluated the patient. I have discussed the patient with the resident/non-physician practitioner and agree with the resident's/non-physician practitioner's findings, Plan of Care, and MDM as documented in the resident's/non-physician practitioner's note, except where noted. All available labs and Radiology studies were reviewed. I was present for key portions of any procedure(s) performed by the resident/non-physician practitioner and I was immediately available to provide assistance. At this point I agree with the current assessment done in the Emergency Department. I have conducted an independent evaluation of this patient a history and physical is as follows:    S:  Chief Complaint   Patient presents with   • Chest Pain     Patient states she is having difficulty breathing, patient has hx of COPD. EMS gave 324 of ASA due to L sided chest pain, does not radiate. Patient on 2L O2 NC. Nba Riggs is a 47 y.o. female who presents with the chief complaint of anxiety about going back to work, paying her rent and the sensation that she can't take a deep breath. She has a recent pmh significant for saddle pulmonary embolism (was admitted 2x over the past month, last discharged 3 days ago). She is satting 95-97% on room air but reports with activity she feels short of breath and that she is unable to take a satisfyingly deep breath. Triage note mentions 2L NC 02 but the patient is not on 02 at home and oxygenation is at or above 95% with room air. O:  ED Triage Vitals [09/15/23 0647]   Temperature Pulse Respirations Blood Pressure SpO2   98 °F (36.7 °C) 77 16 141/90 96 %      Temp Source Heart Rate Source Patient Position - Orthostatic VS BP Location FiO2 (%)   Oral Monitor Lying Right arm --      Pain Score       --         Physical Exam  Vitals and nursing note reviewed. Constitutional:       General: She is in acute distress.       Appearance: She is well-developed. She is obese. HENT:      Head: Normocephalic and atraumatic. Eyes:      Pupils: Pupils are equal, round, and reactive to light. Neck:      Vascular: No JVD. Cardiovascular:      Rate and Rhythm: Normal rate and regular rhythm. Heart sounds: Normal heart sounds. No murmur heard. No friction rub. No gallop. Pulmonary:      Effort: Pulmonary effort is normal. No respiratory distress. Breath sounds: Normal breath sounds. No wheezing or rales. Chest:      Chest wall: No tenderness. Musculoskeletal:         General: No tenderness. Normal range of motion. Cervical back: Normal range of motion. Skin:     General: Skin is warm and dry. Neurological:      General: No focal deficit present. Mental Status: She is alert and oriented to person, place, and time. Psychiatric:         Mood and Affect: Mood is anxious. Affect is labile and tearful. Behavior: Behavior normal.         Thought Content:  Thought content normal.         Judgment: Judgment normal.       A/P:  Background: 47 y.o. female with shortness of breath and anxiety    Differential DX includes but is not limited to: continued effects of known pe,  pneumonia, pleurisy, doubt dissection,  musculoskeletal chest pain, less likely atypical acs/mi,    Plan: cardiac workup, chest xray, probable discharge with op resources         ED Course     Labs Reviewed   CBC AND DIFFERENTIAL - Abnormal       Result Value Ref Range Status    WBC 7.90  4.31 - 10.16 Thousand/uL Final    RBC 4.82  3.81 - 5.12 Million/uL Final    Hemoglobin 14.6  11.5 - 15.4 g/dL Final    Hematocrit 46.6 (*) 34.8 - 46.1 % Final    MCV 97  82 - 98 fL Final    MCH 30.3  26.8 - 34.3 pg Final    MCHC 31.3 (*) 31.4 - 37.4 g/dL Final    RDW 13.6  11.6 - 15.1 % Final    MPV 10.4  8.9 - 12.7 fL Final    Platelets 635  194 - 390 Thousands/uL Final    nRBC 0  /100 WBCs Final    Neutrophils Relative 46  43 - 75 % Final    Immat GRANS % 2  0 - 2 % Final Lymphocytes Relative 39  14 - 44 % Final    Monocytes Relative 11  4 - 12 % Final    Eosinophils Relative 1  0 - 6 % Final    Basophils Relative 1  0 - 1 % Final    Neutrophils Absolute 3.64  1.85 - 7.62 Thousands/µL Final    Immature Grans Absolute 0.16  0.00 - 0.20 Thousand/uL Final    Lymphocytes Absolute 3.04  0.60 - 4.47 Thousands/µL Final    Monocytes Absolute 0.90  0.17 - 1.22 Thousand/µL Final    Eosinophils Absolute 0.10  0.00 - 0.61 Thousand/µL Final    Basophils Absolute 0.06  0.00 - 0.10 Thousands/µL Final   COMPREHENSIVE METABOLIC PANEL - Abnormal    Sodium 141  135 - 147 mmol/L Final    Potassium 4.1  3.5 - 5.3 mmol/L Final    Chloride 108  96 - 108 mmol/L Final    CO2 23  21 - 32 mmol/L Final    ANION GAP 10  mmol/L Final    BUN 16  5 - 25 mg/dL Final    Creatinine 0.73  0.60 - 1.30 mg/dL Final    Comment: Standardized to IDMS reference method    Glucose 111  65 - 140 mg/dL Final    Comment: If the patient is fasting, the ADA then defines impaired fasting glucose as > 100 mg/dL and diabetes as > or equal to 123 mg/dL. Calcium 9.1  8.4 - 10.2 mg/dL Final    AST 21  13 - 39 U/L Final    ALT 47  7 - 52 U/L Final    Comment: Specimen collection should occur prior to Sulfasalazine administration due to the potential for falsely depressed results. Alkaline Phosphatase 49  34 - 104 U/L Final    Total Protein 6.9  6.4 - 8.4 g/dL Final    Albumin 3.8  3.5 - 5.0 g/dL Final    Total Bilirubin 0.19 (*) 0.20 - 1.00 mg/dL Final    Comment: Use of this assay is not recommended for patients undergoing treatment with eltrombopag due to the potential for falsely elevated results. N-acetyl-p-benzoquinone imine (metabolite of Acetaminophen) will generate erroneously low results in samples for patients that have taken an overdose of Acetaminophen.     eGFR 95  ml/min/1.73sq m Final    Narrative:     [place] guidelines for Chronic Kidney Disease (CKD):   •  Stage 1 with normal or high GFR (GFR > 90 mL/min/1.73 square meters)  •  Stage 2 Mild CKD (GFR = 60-89 mL/min/1.73 square meters)  •  Stage 3A Moderate CKD (GFR = 45-59 mL/min/1.73 square meters)  •  Stage 3B Moderate CKD (GFR = 30-44 mL/min/1.73 square meters)  •  Stage 4 Severe CKD (GFR = 15-29 mL/min/1.73 square meters)  •  Stage 5 End Stage CKD (GFR <15 mL/min/1.73 square meters)  Note: GFR calculation is accurate only with a steady state creatinine   HS TROPONIN I 0HR - Normal    hs TnI 0hr 3  "Refer to ACS Flowchart"- see link ng/L Final    Comment:                                              Initial (time 0) result  If >=50 ng/L, Myocardial injury suggested ;  Type of myocardial injury and treatment strategy  to be determined. If 5-49 ng/L, a delta result at 2 hours and or 4 hours will be needed to further evaluate. If <4 ng/L, and chest pain has been >3 hours since onset, patient may qualify for discharge based on the HEART score in the ED. If <5 ng/L and <3hours since onset of chest pain, a delta result at 2 hours will be needed to further evaluate. HS Troponin 99th Percentile URL of a Health Population=12 ng/L with a 95% Confidence Interval of 8-18 ng/L. Second Troponin (time 2 hours)  If calculated delta >= 20 ng/L,  Myocardial injury suggested ; Type of myocardial injury and treatment strategy to be determined. If 5-49 ng/L and the calculated delta is 5-19 ng/L, consult medical service for evaluation. Continue evaluation for ischemia on ecg and other possible etiology and repeat hs troponin at 4 hours. If delta is <5 ng/L at 2 hours, consider discharge based on risk stratification via the HEART score (if in ED), or DAVID risk score in IP/Observation. HS Troponin 99th Percentile URL of a Health Population=12 ng/L with a 95% Confidence Interval of 8-18 ng/L.   LIGHT BLUE TOP     XR chest 2 views   ED Interpretation   This film was interpreted independently by me. .  No infiltrate, cardiac silhouette normal, no pleural effusions or pulmonary edema. Final Result      No acute cardiopulmonary disease. Workstation performed: [id number]           Medications   LORazepam (ATIVAN) tablet 1 mg (1 mg Oral Given 9/15/23 0817)         Critical Care Time  ECG 12 Lead Documentation Only    Date/Time: 9/15/2023 8:00 AM    Performed by: hSannon Herrera MD  Authorized by: Shannon Herrera MD    Indications / Diagnosis:  Shortness of breath  ECG reviewed by me, the ED Provider: yes    Patient location:  ED  Previous ECG:     Previous ECG:  Compared to current    Comparison ECG info:  09/06/2023    Similarity:  No change  Interpretation:     Interpretation: normal    Rate:     ECG rate:  73    ECG rate assessment: normal    Rhythm:     Rhythm: sinus rhythm    Ectopy:     Ectopy: none    QRS:     QRS axis:  Normal    QRS intervals:  Normal  Conduction:     Conduction: normal    ST segments:     ST segments:  Normal  T waves:     T waves: normal          Time reflects when diagnosis was documented in both MDM as applicable and the Disposition within this note     Time User Action Codes Description Comment    9/15/2023  8:32 AM Shittou, Leopold Peed Add [R07.9] Chest pain       ED Disposition     ED Disposition   Discharge    Condition   Stable    Date/Time   Fri Sep 15, 2023  8:32 AM    Comment   [facility] discharge to home/self care.                Follow-up Information     Follow up With Specialties Details Why [address], [address] [facility] Nurse Practitioner   [address] [facility]  [address] [facility] [address]  [phone]

## 2023-09-15 NOTE — ASSESSMENT & PLAN NOTE
From IV tape and hospitalization on bilateral inner elbow region.  - itchy, erythematous  - prednisone x3 days  - f/u PRN

## 2023-09-15 NOTE — DISCHARGE INSTRUCTIONS
Your work-up in the emergency department are unremarkable. Follow-up with your PCP  Return sooner to the ED if you experience worsening symptoms.

## 2023-09-15 NOTE — TELEPHONE ENCOUNTER
Regarding: chest tightness  ----- Message from [facility] sent at 9/15/2023  5:40 AM EDT -----  "I am experiencing some chest tightness.  Not sure if its just my anxiety."

## 2023-09-15 NOTE — TELEPHONE ENCOUNTER
Reason for Disposition  • [1] Chest pain lasts > 5 minutes AND [2] age > 40    Answer Assessment - Initial Assessment Questions  1. LOCATION: "Where does it hurt?"        Center of chest    2. RADIATION: "Does the pain go anywhere else?" (e.g., into neck, jaw, arms, back)       Denies     3. ONSET: "When did the chest pain begin?" (Minutes, hours or days)       1am    4. PATTERN "Does the pain come and go, or has it been constant since it started?"  "Does it get worse with exertion?"       Constant    5. DURATION: "How long does it last" (e.g., seconds, minutes, hours)      Constant     6. SEVERITY: "How bad is the pain?"  (e.g., Scale 1-10; mild, moderate, or severe)     - MILD (1-3): doesn't interfere with normal activities      - MODERATE (4-7): interferes with normal activities or awakens from sleep     - SEVERE (8-10): excruciating pain, unable to do any normal activities        Mild to moderate    7. CARDIAC RISK FACTORS: "Do you have any history of heart problems or risk factors for heart disease?" (e.g., angina, prior heart attack; diabetes, high blood pressure, high cholesterol, smoker, or strong family history of heart disease)      HTN, former smoker    8. PULMONARY RISK FACTORS: "Do you have any history of lung disease?"  (e.g., blood clots in lung, asthma, emphysema, birth control pills)      PE- just got out of hospital on Tuesday 9. CAUSE: "What do you think is causing the chest pain?"      ? Anxiety and stress    10. OTHER SYMPTOMS: "Do you have any other symptoms?" (e.g., dizziness, nausea, vomiting, sweating, fever, difficulty breathing, cough)        Worsening tremors, upset stomach "because I went on an eating binge yesterday"- had diarrhea earlier, slightly increased SOB    Protocol disposition discussed with patient (call 911 now). Patient verbalized understanding and is agreeable.     Protocols used: CHEST PAIN-ADULT-

## 2023-09-15 NOTE — PROGRESS NOTES
Name: Kalani Grimes      : 1970      MRN: [id number]  Encounter Provider: Yovanny Armstrong DO  Encounter Date: 9/15/2023   Encounter department: [facility] Enrique Antunez     1. Acute saddle pulmonary embolism, unspecified whether acute cor pulmonale present [facility])  Assessment & Plan:  Continue warfarin 6mg daily  - repeat INT weekly, yesterday 2.14 (range 2-3 normal)  - follows with heme onc outpatient      2. Chronic obstructive pulmonary disease, unspecified COPD type ([address])  Assessment & Plan:  Continue bipap nightly  - home albuterol as needed as well as PTA ellipta    Orders:  -     albuterol (Ventolin HFA) 90 mcg/act inhaler; Inhale 2 puffs every 6 (six) hours as needed for wheezing    3. Contact dermatitis and eczema  Assessment & Plan:  From IV tape and hospitalization on bilateral inner elbow region.  - itchy, erythematous  - prednisone x3 days  - f/u PRN    Orders:  -     predniSONE 20 mg tablet; Take 2 tablets (40 mg total) by mouth daily for 3 days    4. Anxiety  Assessment & Plan:  Worsened from saddle PE.  - prescribed short course of atarax    Orders:  -     hydrOXYzine HCL (ATARAX) 50 mg tablet; Take 1 tablet (50 mg total) by mouth daily at bedtime           Subjective      Pt is a 48 yo F here for TCM related to saddle PE. Currently on eliquis, not tachycardic or hypoxic currently. Does feel short of breath at times. Overall doing okay, is feeling anxious and has dermatitis from IV tape. Review of Systems   Constitutional: Negative for chills and fever. HENT: Negative for ear pain and sore throat. Eyes: Negative for pain and visual disturbance. Respiratory: Positive for shortness of breath. Negative for cough. Cardiovascular: Negative for chest pain and palpitations. Gastrointestinal: Negative for abdominal pain and vomiting. Genitourinary: Negative for dysuria and hematuria. Musculoskeletal: Negative for arthralgias and back pain. Skin: Positive for color change and rash. Neurological: Negative for seizures and syncope. All other systems reviewed and are negative. Current Outpatient Medications on File Prior to Visit   Medication Sig   • amLODIPine (NORVASC) 5 mg tablet TAKE 1 TABLET (5 MG TOTAL) BY MOUTH DAILY   • ammonium lactate (LAC-HYDRIN) 12 % cream    • buPROPion (WELLBUTRIN XL) 150 mg 24 hr tablet Take 1 tablet (150 mg total) by mouth daily   • buPROPion (WELLBUTRIN XL) 300 mg 24 hr tablet Take 300 mg by mouth daily   • carBAMazepine (CARBATROL) 300 MG 12 hr capsule Take 1 capsule by mouth in the morning   • Cholecalciferol (Vitamin D3) 125 MCG (5000 UT) CAPS Take by mouth in the morning   • clobetasol (TEMOVATE) 0.05 % ointment Apply to affected areas sparingly. Do not exceed 2 weeks of treatment. • colestipol (COLESTID) 1 g tablet Take 1 tablet (1 g total) by mouth 2 (two) times a day   • cyproheptadine (PERIACTIN) 4 mg tablet    • Diclofenac Sodium (VOLTAREN) 1 % Apply 2 g topically 4 (four) times a day   • ergocalciferol (ERGOCALCIFEROL) 1.25 MG (04175 UT) capsule    • estradiol (ESTRACE) 0.5 MG tablet Take 1 tablet (0.5 mg total) by mouth daily   • Fesoterodine Fumarate ER (Toviaz) 4 MG TB24 Take 1 tablet by mouth daily   • fluticasone (FLOVENT HFA) 110 MCG/ACT inhaler Inhale 2 puffs as needed Rinse mouth after use.    • furosemide (LASIX) 20 mg tablet Take 1 tablet (20 mg total) by mouth daily   • haloperidol decanoate (Haldol Decanoate) 50 mg/mL injection every 30 (thirty) days   • hydrocortisone 2.5 % ointment Apply topically 2 (two) times a day   • levothyroxine (Euthyrox) 125 mcg tablet Take 1 tablet (125 mcg total) by mouth daily in the early morning   • losartan (COZAAR) 50 mg tablet TAKE ONE TABLET BY MOUTH DAILY   • memantine (NAMENDA) 10 mg tablet    • metoprolol tartrate (LOPRESSOR) 25 mg tablet TAKE ONE TABLET BY MOUTH EVERY 12 HOURS   • ondansetron (ZOFRAN-ODT) 4 mg disintegrating tablet Take 1 tablet (4 mg total) by mouth every 6 (six) hours as needed for nausea or vomiting   • pilocarpine (SALAGEN) 5 mg tablet Take 5 mg by mouth 2 (two) times a day   • RABEprazole (ACIPHEX) 20 MG tablet Take 20 mg by mouth 2 (two) times a day   • rOPINIRole (REQUIP) 1 mg tablet    • trospium chloride (SANCTURA) 20 mg tablet Take 20 mg by mouth 2 (two) times a day   • venlafaxine (EFFEXOR) 37.5 mg tablet Take 37.5 mg by mouth daily   • warfarin (COUMADIN) 6 mg tablet Take 6mg (one tablet) daily every day by mouth. • [DISCONTINUED] albuterol (Ventolin HFA) 90 mcg/act inhaler Inhale 2 puffs every 6 (six) hours as needed for wheezing   • Diclofenac Sodium (VOLTAREN) 1 % Apply 2 g topically 3 (three) times a day as needed (pain) For pain to affected area (Patient not taking: Reported on 9/7/2023)   • divalproex sodium (DEPAKOTE) 250 mg EC tablet Take 3 tablets (750 mg total) by mouth every 12 (twelve) hours   • Lurasidone HCl 60 MG TABS Take 1 tablet (60 mg total) by mouth 2 (two) times a day TAKEN IN THE MORNING AND AT NIGHT-----Latuda   • topiramate (TOPAMAX) 100 mg tablet Take 1.5 tablets (150 mg total) by mouth 2 (two) times a day       Objective     /80 (BP Location: Left arm, Patient Position: Sitting, Cuff Size: Adult)   Pulse 75   Temp 97.7 °F (36.5 °C) (Temporal)   Ht 5' 6" (1.676 m)   Wt 131 kg (288 lb)   SpO2 94%   BMI 46.48 kg/m²     Physical Exam  Vitals reviewed. Constitutional:       Appearance: Normal appearance. She is normal weight. HENT:      Head: Normocephalic. Right Ear: Tympanic membrane, ear canal and external ear normal. There is no impacted cerumen. Left Ear: Tympanic membrane, ear canal and external ear normal. There is no impacted cerumen. Nose: Nose normal.      Mouth/Throat:      Mouth: Mucous membranes are moist.      Pharynx: Oropharynx is clear. Eyes:      Extraocular Movements: Extraocular movements intact.       Conjunctiva/sclera: Conjunctivae normal.      Pupils: Pupils are equal, round, and reactive to light. Cardiovascular:      Rate and Rhythm: Normal rate and regular rhythm. Pulses: Normal pulses. Heart sounds: Normal heart sounds. Pulmonary:      Effort: Pulmonary effort is normal.      Breath sounds: Normal breath sounds. Abdominal:      General: Bowel sounds are normal. There is no distension. Palpations: Abdomen is soft. Tenderness: There is no abdominal tenderness. Hernia: No hernia is present. Musculoskeletal:         General: Normal range of motion. Cervical back: Normal range of motion. Skin:     General: Skin is warm. Capillary Refill: Capillary refill takes less than 2 seconds. Findings: Erythema and rash present. Neurological:      General: No focal deficit present. Mental Status: She is alert and oriented to person, place, and time. Mental status is at baseline. Psychiatric:         Mood and Affect: Mood normal.         Behavior: Behavior normal.         Thought Content:  Thought content normal.         Judgment: Judgment normal.       Mario Schultz DO

## 2023-09-15 NOTE — ED NOTES
Patient aware she was dc'd and a/w ride and told to wait in room. Pt eloped to [address] with IV still in arm. IV dc'd at this time.  Pt a/w brent in [facility], RN  09/15/23 5920

## 2023-09-15 NOTE — ED PROVIDER NOTES
History  Chief Complaint   Patient presents with   • Chest Pain     Patient states she is having difficulty breathing, patient has hx of COPD. EMS gave 324 of ASA due to L sided chest pain, does not radiate. Patient on 2L O2 NC. (Zayra Corrales) Zayra Corrales is a 47 y.o. female who identifies as female    They presented to the emergency department on September 15, 2023. Patient presents with:  Chest Pain: Patient states she is having difficulty breathing, patient has hx of COPD. EMS gave 324 of ASA due to L sided chest pain, does not radiate. Patient on 2L O2 NC. .    The patient states that  she was discharged from the hospital 4days ago. The  next day, she was okay but then the following day, she started thinking about her rent that is due in a few days and then started feeling anxious about it. Patient also reported that her fax machine was not working and she is frustrated about how everything is " not working for her" . She reported that last night she could not sleep thinking about all the activities that she has to do i.e. fixing her printer, paying  her rent and started feeling short of breath. Later in the night, patient started having chest pressure and at 6 AM this morning, she called her family doctor who recommended she come to the ED. Patient denies palpitations, nausea, or any other complaint at this time. Allergies include:  -- Percocet Oxycodone-Acetaminophen-- Headache   --  Severe headaches   (denies issues with Tylenol)  -- Povidone Iodine -- Rash   --  Unsure if betadine or gauze post surgical. Got            rash on leg.  Has  Had itchy rashes after every            surgery prep and IV insertion  -- Chlorhexidine -- Rash   --  Per pt "able to use the liquid soap--thinkd            reaction from the sponges or wipes"      Immunizations:    Immunization History  Administered            Date(s) Administered   COVID-19 PFIZER VACCINE 0.3 ML IM                         03/31/2021 04/21/2021 12/13/2021 05/09/2022     COVID-19 Pfizer vac (Edwar-sucrose, gray cap) 12 yr+ IM                         05/09/2022     INFLUENZA             08/07/2014  07/29/2015  10/10/2018                           11/10/2022     Influenza Split       07/29/2015     Influenza, recombinant, quadrivalent,injectable, preservative free                         11/27/2018  09/20/2021  11/10/2022     Tdap                  01/26/2009 07/23/2018     Tuberculin Skin Test-PPD Intradermal                         12/10/2018  01/26/2022    Immunizations Reviewed. Prior to Admission Medications   Prescriptions Last Dose Informant Patient Reported? Taking?    Cholecalciferol (Vitamin D3) 125 MCG (5000 UT) CAPS  Self Yes No   Sig: Take by mouth in the morning   Diclofenac Sodium (VOLTAREN) 1 %   No No   Sig: Apply 2 g topically 3 (three) times a day as needed (pain) For pain to affected area   Patient not taking: Reported on 9/7/2023   Diclofenac Sodium (VOLTAREN) 1 %   No No   Sig: Apply 2 g topically 4 (four) times a day   Fesoterodine Fumarate ER (Toviaz) 4 MG TB24  Self Yes No   Sig: Take 1 tablet by mouth daily   Lurasidone HCl 60 MG TABS   No No   Sig: Take 1 tablet (60 mg total) by mouth 2 (two) times a day TAKEN IN THE MORNING AND AT NIGHT-----Latuda   RABEprazole (ACIPHEX) 20 MG tablet  Self Yes No   Sig: Take 20 mg by mouth 2 (two) times a day   albuterol (Ventolin HFA) 90 mcg/act inhaler  Self No No   Sig: Inhale 2 puffs every 6 (six) hours as needed for wheezing   amLODIPine (NORVASC) 5 mg tablet   No No   Sig: TAKE 1 TABLET (5 MG TOTAL) BY MOUTH DAILY   ammonium lactate (LAC-HYDRIN) 12 % cream   Yes No   buPROPion (WELLBUTRIN XL) 150 mg 24 hr tablet  Self No No   Sig: Take 1 tablet (150 mg total) by mouth daily   buPROPion (WELLBUTRIN XL) 300 mg 24 hr tablet  Self Yes No   Sig: Take 300 mg by mouth daily   carBAMazepine (CARBATROL) 300 MG 12 hr capsule  Self Yes No   Sig: Take 1 capsule by mouth in the morning   clobetasol (TEMOVATE) 0.05 % ointment   No No   Sig: Apply to affected areas sparingly. Do not exceed 2 weeks of treatment. colestipol (COLESTID) 1 g tablet  Self No No   Sig: Take 1 tablet (1 g total) by mouth 2 (two) times a day   cyproheptadine (PERIACTIN) 4 mg tablet   Yes No   divalproex sodium (DEPAKOTE) 250 mg EC tablet  Self No No   Sig: Take 3 tablets (750 mg total) by mouth every 12 (twelve) hours   ergocalciferol (ERGOCALCIFEROL) 1.25 MG (13205 UT) capsule   Yes No   estradiol (ESTRACE) 0.5 MG tablet   No No   Sig: Take 1 tablet (0.5 mg total) by mouth daily   fluticasone (FLOVENT HFA) 110 MCG/ACT inhaler  Self Yes No   Sig: Inhale 2 puffs as needed Rinse mouth after use.    furosemide (LASIX) 20 mg tablet   No No   Sig: Take 1 tablet (20 mg total) by mouth daily   haloperidol decanoate (Haldol Decanoate) 50 mg/mL injection  Self Yes No   Sig: every 30 (thirty) days   hydrocortisone 2.5 % ointment  Self No No   Sig: Apply topically 2 (two) times a day   levothyroxine (Euthyrox) 125 mcg tablet  Self No No   Sig: Take 1 tablet (125 mcg total) by mouth daily in the early morning   losartan (COZAAR) 50 mg tablet   No No   Sig: TAKE ONE TABLET BY MOUTH DAILY   memantine (NAMENDA) 10 mg tablet   Yes No   metoprolol tartrate (LOPRESSOR) 25 mg tablet   No No   Sig: TAKE ONE TABLET BY MOUTH EVERY 12 HOURS   ondansetron (ZOFRAN-ODT) 4 mg disintegrating tablet   No No   Sig: Take 1 tablet (4 mg total) by mouth every 6 (six) hours as needed for nausea or vomiting   pilocarpine (SALAGEN) 5 mg tablet  Self Yes No   Sig: Take 5 mg by mouth 2 (two) times a day   rOPINIRole (REQUIP) 1 mg tablet  Self Yes No   topiramate (TOPAMAX) 100 mg tablet   No No   Sig: Take 1.5 tablets (150 mg total) by mouth 2 (two) times a day   trospium chloride (SANCTURA) 20 mg tablet  Self Yes No   Sig: Take 20 mg by mouth 2 (two) times a day   venlafaxine (EFFEXOR) 37.5 mg tablet  Self Yes No   Sig: Take 37.5 mg by mouth daily   warfarin (COUMADIN) 6 mg tablet   No No   Sig: Take 6mg (one tablet) daily every day by mouth. Facility-Administered Medications: None       Past Medical History:   Diagnosis Date   • Anxiety    • Arthritis     oa cassandra knees   • Asthma     good control- no medications   • Yan's esophagus    • Bipolar affective disorder ([address])    • Cannabis abuse with unspecified 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[address])    • Chronic pain disorder     lumbar   • COPD (chronic obstructive pulmonary disease) ([facility])    • CPAP (continuous positive airway pressure) dependence    • Degenerative disc disease at L5-S1 level    • Deliberate self-cutting     9/15/22per pt has not done recently-- does see a therapist and psychiatrist regularly   • Depression 09/16/2008   • Disease of thyroid gland     hypo   • MARTINEZ (dyspnea on exertion)     per pt "has had this with exertion and not new"   • Drug overdose 10/28/2008    suicide attempt and again drug overdose 7/2022--[facility] floor and than transferred to [facility]   • Dysphagia    • Dyspnea    • Edema     BLE   • Family history of blood clots    • GERD (gastroesophageal reflux disease)    • Headache(784.0)    • Headache, tension-type    • High blood pressure    • History of COVID-19 12/30/2020    Symptoms started on 12/30/2020 and then got worse. Today she is feeling a little bit better.   She is a candidate for monoclonal antibody infusion and set for 1/6/21 @ 1pm at [facility]. 01/07/21 - telemedicine -    • Hyperlipidemia    • Hypertension    • Knee pain, bilateral     Especially right   • Medical marijuana use    • Memory loss    • Migraines    • Obesity    • Overactive bladder    • Sjogren's disease ([address])    • Sleep apnea    • Stress incontinence    • Suicidal ideations    • Teeth missing    • Tremor     per pt Tremors of Right Leg and both Arms   • Visual impairment    • Wears glasses        Past Surgical History:   Procedure Laterality Date   • BREAST CYST EXCISION Right 1989   • CARPAL TUNNEL RELEASE Left    • CHOLECYSTECTOMY  05/2003    Laparoscopic   • COLONOSCOPY      01/12/2009   • DILATION AND CURETTAGE OF UTERUS     • ELBOW SURGERY Left     x2.  No hardware   • ESOPHAGOGASTRODUODENOSCOPY     • FOOT SURGERY Left     Plantar fasciotomy   • HERNIA REPAIR     • HYSTERECTOMY      laporoscopic, partial   • KNEE ARTHROSCOPY Bilateral    • OOPHORECTOMY Left 10/2015   • WA GASTROCNEMIUS RECESSION Left 02/24/2021    Procedure: RECESSION GASTROC OPEN;  Surgeon: Isac Angelucci, DPM;  Location: [facility] MAIN OR;  Service: Podiatry   • WA RPR UMBILICAL HRNA 5 YRS/> REDUCIBLE N/A 04/24/2019    Procedure: REPAIR HERNIA UMBILICAL LAPAROSCOPIC W/ ROBOTICS;  Surgeon: Sari Hui MD;  Location: [facility] Main OR;  Service: General   • WA SPHINCTEROTOMY ANAL DIVISION SPHINCTER [facility] N/A 08/31/2018    Procedure: EUA, LEFT PARTIAL INTERNAL SPHINCTEROTOMY;  Surgeon: Keren Montes De Oca MD;  Location: [facility] MAIN OR;  Service: Colorectal   • WA TNOT ELBOW LATERAL/MEDIAL DEBRIDE OPEN TDN RPR Left 09/20/2022    Procedure: RELEASE EPICONDYLAR ELBOW MEDIAL;  Surgeon: Merlin Side, MD;  Location[facility];  Service: Orthopedics   • REDUCTION MAMMAPLASTY Bilateral 1999   • REPAIR LACERATION Left     left hand-5/18/2009   • REPLACEMENT TOTAL KNEE Right    • ROTATOR CUFF REPAIR Left    • TONSILECTOMY AND ADNOIDECTOMY     • US GUIDED MSK PROCEDURE  04/22/2021   • VASCULAR SURGERY     • VEIN LIGATION Bilateral    • WISDOM TOOTH EXTRACTION         Family History   Problem Relation Age of Onset   • Kidney cancer Mother    • Diabetes Mother    • Depression Mother    • Stroke Mother    • Arthritis Mother    • Cancer Mother    • Psychiatric Illness Mother    • No Known Problems Father    • No Known Problems Sister    • No Known Problems Maternal Grandmother    • No Known Problems Maternal Grandfather    • No Known Problems Paternal Grandmother    • No Known Problems Paternal Grandfather    • Colon cancer Maternal Uncle    • Colon cancer Maternal Uncle    • Colon cancer Paternal Uncle    • Colon cancer Family    • Alcohol abuse Sister    • Asthma Sister      I have reviewed and agree with the history as documented. E-Cigarette/Vaping   • E-Cigarette Use Current Some Day User    • Comments medical THC      E-Cigarette/Vaping Substances   • Nicotine No    • THC Yes    • CBD No    • Flavoring No    • Other No    • Unknown No      Social History     Tobacco Use   • Smoking status: Former     Packs/day: 2.00     Years: 30.00     Total pack years: 60.00     Types: Cigarettes     Start date:  [facility]     Quit date: 2018     Years since quittin.7   • Smokeless tobacco: Never   • Tobacco comments:     Started at age 14. Vaping Use   • Vaping Use: Some days   • Substances: THC   Substance Use Topics   • Alcohol use: Not Currently     Comment: Recovering alcoholic   • Drug use: Yes     Types: Marijuana, Methamphetamines     Comment: Medical marijuana        Review of Systems   Constitutional: Negative for chills and fever. HENT: Negative for ear pain and sore throat. Eyes: Negative for pain and visual disturbance. Respiratory: Positive for shortness of breath. Negative for cough. Cardiovascular: Positive for chest pain (Pressure). Negative for palpitations. Gastrointestinal: Negative for abdominal pain and vomiting. Genitourinary: Negative for dysuria and hematuria. Musculoskeletal: Negative for arthralgias and back pain. Skin: Negative for color change and rash. Neurological: Negative for seizures and syncope. All other systems reviewed and are negative.       Physical Exam  ED Triage Vitals [09/15/23 0647]   Temperature Pulse Respirations Blood Pressure SpO2   98 °F (36.7 °C) 77 16 141/90 96 %      Temp Source Heart Rate Source Patient Position - Orthostatic VS BP Location FiO2 (%)   Oral Monitor Lying Right arm --      Pain Score       --             Orthostatic Vital Signs  Vitals:    09/15/23 7636 09/15/23 0700 09/15/23 0730   BP: 141/90 141/98 159/92   Pulse: 77 84 74   Patient Position - Orthostatic VS: Lying         Physical Exam  Vitals and nursing note reviewed. Constitutional:       General: She is not in acute distress. Appearance: She is well-developed. Comments: Patient at bedside, crying due to feeling overwhelmed. HENT:      Head: Normocephalic and atraumatic. Eyes:      Conjunctiva/sclera: Conjunctivae normal.   Cardiovascular:      Rate and Rhythm: Normal rate and regular rhythm. Heart sounds: No murmur heard. Pulmonary:      Effort: Pulmonary effort is normal. No respiratory distress. Breath sounds: Normal breath sounds. Abdominal:      Palpations: Abdomen is soft. Tenderness: There is no abdominal tenderness. Musculoskeletal:         General: No swelling. Cervical back: Neck supple. Skin:     General: Skin is warm and dry. Capillary Refill: Capillary refill takes less than 2 seconds. Neurological:      Mental Status: She is alert.    Psychiatric:         Mood and Affect: Mood normal.         ED Medications  Medications   LORazepam (ATIVAN) tablet 1 mg (1 mg Oral Given 9/15/23 0817)       Diagnostic Studies  Results Reviewed     Procedure Component Value Units Date/Time    HS Troponin I 4hr [[id number]]     Lab Status: No result Specimen: Blood     Comprehensive metabolic panel [[id number]]  (Abnormal) Collected: 09/15/23 0657    Lab Status: Final result Specimen: Blood from Arm, Left Updated: 09/15/23 0733     Sodium 141 mmol/L      Potassium 4.1 mmol/L      Chloride 108 mmol/L      CO2 23 mmol/L      ANION GAP 10 mmol/L      BUN 16 mg/dL      Creatinine 0.73 mg/dL      Glucose 111 mg/dL      Calcium 9.1 mg/dL      AST 21 U/L      ALT 47 U/L      Alkaline Phosphatase 49 U/L      Total Protein 6.9 g/dL      Albumin 3.8 g/dL      Total Bilirubin 0.19 mg/dL      eGFR 95 ml/min/1.73sq m     Narrative:      [facility] guidelines for Chronic Kidney Disease (CKD):   •  Stage 1 with normal or high GFR (GFR > 90 mL/min/1.73 square meters)  •  Stage 2 Mild CKD (GFR = 60-89 mL/min/1.73 square meters)  •  Stage 3A Moderate CKD (GFR = 45-59 mL/min/1.73 square meters)  •  Stage 3B Moderate CKD (GFR = 30-44 mL/min/1.73 square meters)  •  Stage 4 Severe CKD (GFR = 15-29 mL/min/1.73 square meters)  •  Stage 5 End Stage CKD (GFR <15 mL/min/1.73 square meters)  Note: GFR calculation is accurate only with a steady state creatinine    HS Troponin I 2hr [[id number]]     Lab Status: No result Specimen: Blood     HS Troponin 0hr (reflex protocol) [[id number]]  (Normal) Collected: 09/15/23 0657    Lab Status: Final result Specimen: Blood from Arm, Left Updated: 09/15/23 0731     hs TnI 0hr 3 ng/L     CBC and differential [[id number]]  (Abnormal) Collected: 09/15/23 0657    Lab Status: Final result Specimen: Blood from Arm, Left Updated: 09/15/23 0705     WBC 7.90 Thousand/uL      RBC 4.82 Million/uL      Hemoglobin 14.6 g/dL      Hematocrit 46.6 %      MCV 97 fL      MCH 30.3 pg      MCHC 31.3 g/dL      RDW 13.6 %      MPV 10.4 fL      Platelets 644 Thousands/uL      nRBC 0 /100 WBCs      Neutrophils Relative 46 %      Immat GRANS % 2 %      Lymphocytes Relative 39 %      Monocytes Relative 11 %      Eosinophils Relative 1 %      Basophils Relative 1 %      Neutrophils Absolute 3.64 Thousands/µL      Immature Grans Absolute 0.16 Thousand/uL      Lymphocytes Absolute 3.04 Thousands/µL      Monocytes Absolute 0.90 Thousand/µL      Eosinophils Absolute 0.10 Thousand/µL      Basophils Absolute 0.06 Thousands/µL                  XR chest 2 views   ED Interpretation by Vicky Angelo MD (09/15 9212)   This film was interpreted independently by me. .  No infiltrate, cardiac silhouette normal, no pleural effusions or pulmonary edema. Final Result by Lisha Rogel MD (09/15 0677)      No acute cardiopulmonary disease. Workstation performed: [id number]               Procedures  ECG 12 Lead Documentation Only    Date/Time: 9/15/2023 9:29 AM    Performed by: Luz Headley MD  Authorized by: Luz Headley MD    Indications / Diagnosis:  Chest pain  ECG reviewed by me, the ED Provider: yes    Patient location:  ED  Previous ECG:     Previous ECG:  Compared to current    Comparison ECG info:  September 6, 2023    Similarity:  No change    Comparison to cardiac monitor: Yes    Interpretation:     Interpretation: normal    Rate:     ECG rate:  73 bpm    ECG rate assessment: normal    Rhythm:     Rhythm: sinus rhythm    Ectopy:     Ectopy: none    QRS:     QRS axis:  Normal    QRS intervals:  Normal  Conduction:     Conduction: normal    ST segments:     ST segments:  Normal  T waves:     T waves: normal    Comments:      Ventricular rate 73 bpm, AR interval 190 ms, QRS duration 94 ms  ms, QTc 427 ms  No acute ST segment elevation or depression. Normal sinus rhythm. ED Course  ED Course as of 09/15/23 1039   Fri Sep 15, 2023   10 15-year-old female past medical history of hypertension and COPD presented for evaluation of shortness of breath in the middle of the night. 0741 CBC and differential(!)  Wnl, normal H&H   0742 Comprehensive metabolic panel(!)  Wnl, no anion gap, no MINNIE. Mildly decreased T. bili.   0742 hs TnI 0hr: 3   0818 XR chest 2 views  No acute cardiopulmonary disease. 3278 On further examination, patient reported she felt anxious. Gave 1 mg of Ativan after which patient reported feeling better and stable for discharge. Patient discharged with return precautions. Medical Decision Making  Patient presents with:  Chest Pain: Patient states she is having difficulty breathing, patient has hx of COPD. EMS gave 324 of ASA due to L sided chest pain, does not radiate. Patient on 2L O2 NC.        Patient seen and examined noted to have shortness of breath, chest pressure. Temp:  (98 °F (36.7 °C)) 98 °F (36.7 °C)  HR:  (74-84) 74  Resp:  (16) 16  BP: (141-159)/(90-98) 159/92    Differential diagnosis includes but is not limited to PE, ACS, anxiety among others.       Due to patient's history and presentation the following laboratory evidence was collected:  Recent Results (from the past 12 hour(s))  -CBC and differential:   Collection Time: 09/15/23  6:57 AM       Result                      Value             Ref Range           WBC                         7.90              4.31 - 10.16*       RBC                         4.82              3.81 - 5.12 *       Hemoglobin                  14.6              11.5 - 15.4 *       Hematocrit                  46.6 (H)          34.8 - 46.1 %       MCV                         97                82 - 98 fL          MCH                         30.3              26.8 - 34.3 *       MCHC                        31.3 (L)          31.4 - 37.4 *       RDW                         13.6              11.6 - 15.1 %       MPV                         10.4              8.9 - 12.7 fL       Platelets                   152               149 - 390 Th*       nRBC                        0                 /100 WBCs           Neutrophils Relative        46                43 - 75 %           Immat GRANS %               2                 0 - 2 %             Lymphocytes Relative        39                14 - 44 %           Monocytes Relative          11                4 - 12 %            Eosinophils Relative        1                 0 - 6 %             Basophils Relative          1                 0 - 1 %             Neutrophils Absolute        3.64              1.85 - 7.62 *       Immature Grans Absolute     0.16              0.00 - 0.20 *       Lymphocytes Absolute        3.04              0.60 - 4.47 *       Monocytes Absolute          0.90              0.17 - 1.22 *       Eosinophils Absolute        0.10              0.00 - 0.61 *       Basophils Absolute          0.06              0.00 - 0.10 *  -Comprehensive metabolic panel:   Collection Time: 09/15/23  6:57 AM       Result                      Value             Ref Range           Sodium                      141               135 - 147 mm*       Potassium                   4.1               3.5 - 5.3 mm*       Chloride                    108               96 - 108 mmo*       CO2                         23                21 - 32 mmol*       ANION GAP                   10                mmol/L              BUN                         16                5 - 25 mg/dL        Creatinine                  0.73              0.60 - 1.30 *       Glucose                     111               65 - 140 mg/*       Calcium                     9.1               8.4 - 10.2 m*       AST                         21                13 - 39 U/L         ALT                         47                7 - 52 U/L          Alkaline Phosphatase        49                34 - 104 U/L        Total Protein               6.9               6.4 - 8.4 g/*       Albumin                     3.8               3.5 - 5.0 g/*       Total Bilirubin             0.19 (L)          0.20 - 1.00 *       eGFR                        95                ml/min/1.73s*  -HS Troponin 0hr (reflex protocol):   Collection Time: 09/15/23  6:57 AM       Result                      Value             Ref Range           hs TnI 0hr                  3                 "Refer to [facility]*  -ECG 12 lead:   Collection Time: 09/15/23  7:17 AM       Result                      Value             Ref Range           Ventricular Rate            73                BPM                 Atrial Rate                 73                BPM                 AR Interval                 190               ms                  QRSD Interval               94                ms                  QT Interval                 388               ms                  QTC Interval 427               ms                  P Axis                      59                degrees             QRS Axis                    -5                degrees             T Wave Axis                 67                degrees          Due to patient's history and presentation the following imaging was collected:  XR chest 2 views   ED Interpretation    This film was interpreted independently by me. .No infiltrate, cardiac silhouette normal, no pleural effusions or pulmonary edema. Final Result        No acute cardiopulmonary disease. Workstation performed: [id number]         No results found. EKG: interpreted by myself as above. Patient was administered:      Medication Administration - last 24 hours from 09/14/2023 0933 to   09/15/2023 1212       Date/Time Order Dose Route Action Action by     09/15/2023 0817 EDT LORazepam (ATIVAN) tablet 1 mg 1 mg Oral Given Charlie Chan RN        Patient appears well, is nontoxic appearing, she expresses understanding and agrees with plan of care at this time. In light of this patient would benefit from outpatient management. Patient with unremarkable work-up. I Suspect that patient's symptoms is related to her anxiety. Patient discharge with return precaution. Chest pain: acute illness or injury  Amount and/or Complexity of Data Reviewed  Labs: ordered. Decision-making details documented in ED Course. Radiology: ordered and independent interpretation performed. Decision-making details documented in ED Course. Risk  Prescription drug management.             Disposition  Final diagnoses:   Chest pain     Time reflects when diagnosis was documented in both MDM as applicable and the Disposition within this note     Time User Action Codes Description Comment    9/15/2023  8:32 AM Amanda Plasencia Add [R07.9] Chest pain       ED Disposition     ED Disposition   Discharge    Condition   Stable    Date/Time   Fri Sep 15, 2023  8:32 AM    Comment   [place] discharge to home/self care. Follow-up Information     Follow up With Specialties Details Why Contact Info    Yari Shaw, [address] [facility] Nurse Practitioner   [address] [place] [address]  [phone]            Discharge Medication List as of 9/15/2023  8:32 AM      CONTINUE these medications which have NOT CHANGED    Details   albuterol (Ventolin HFA) 90 mcg/act inhaler Inhale 2 puffs every 6 (six) hours as needed for wheezing, Starting Mon 6/20/2022, Normal      amLODIPine (NORVASC) 5 mg tablet TAKE 1 TABLET (5 MG TOTAL) BY MOUTH DAILY, Starting Wed 7/12/2023, Normal      ammonium lactate (LAC-HYDRIN) 12 % cream Starting Wed 8/2/2023, Historical Med      buPROPion (WELLBUTRIN XL) 300 mg 24 hr tablet Take 300 mg by mouth daily, Starting Fri 9/16/2022, Historical Med      carBAMazepine (CARBATROL) 300 MG 12 hr capsule Take 1 capsule by mouth in the morning, Starting Wed 11/23/2022, Historical Med      Cholecalciferol (Vitamin D3) 125 MCG (5000 UT) CAPS Take by mouth in the morning, Starting Wed 5/25/2022, Historical Med      clobetasol (TEMOVATE) 0.05 % ointment Apply to affected areas sparingly. Do not exceed 2 weeks of treatment., Normal      colestipol (COLESTID) 1 g tablet Take 1 tablet (1 g total) by mouth 2 (two) times a day, Starting Sat 7/16/2022, Until Sat 9/2/2023, No Print      cyproheptadine (PERIACTIN) 4 mg tablet Historical Med      !! Diclofenac Sodium (VOLTAREN) 1 % Apply 2 g topically 3 (three) times a day as needed (pain) For pain to affected area, Starting Fri 5/5/2023, Normal      !!  Diclofenac Sodium (VOLTAREN) 1 % Apply 2 g topically 4 (four) times a day, Starting Mon 7/17/2023, Normal      divalproex sodium (DEPAKOTE) 250 mg EC tablet Take 3 tablets (750 mg total) by mouth every 12 (twelve) hours, Starting Mon 7/25/2022, Until Thu 12/15/2022, Normal      ergocalciferol (ERGOCALCIFEROL) 1.25 MG (96117 UT) capsule Historical Med estradiol (ESTRACE) 0.5 MG tablet Take 1 tablet (0.5 mg total) by mouth daily, Starting Thu 5/4/2023, Normal      Fesoterodine Fumarate ER (Toviaz) 4 MG TB24 Take 1 tablet by mouth daily, Historical Med      fluticasone (FLOVENT HFA) 110 MCG/ACT inhaler Inhale 2 puffs as needed Rinse mouth after use., Historical Med      furosemide (LASIX) 20 mg tablet Take 1 tablet (20 mg total) by mouth daily, Starting Wed 9/6/2023, Normal      haloperidol decanoate (Haldol Decanoate) 50 mg/mL injection every 30 (thirty) days, Starting Wed 8/3/2022, Historical Med      hydrocortisone 2.5 % ointment Apply topically 2 (two) times a day, Starting Tue 10/25/2022, Normal      levothyroxine (Euthyrox) 125 mcg tablet Take 1 tablet (125 mcg total) by mouth daily in the early morning, Starting Thu 3/16/2023, Normal      losartan (COZAAR) 50 mg tablet TAKE ONE TABLET BY MOUTH DAILY, Normal      Lurasidone HCl 60 MG TABS Take 1 tablet (60 mg total) by mouth 2 (two) times a day TAKEN IN THE MORNING AND AT NIGHT-----[place], Starting Thu 1/26/2023, Until Sat 9/2/2023, No Print      memantine (NAMENDA) 10 mg tablet Historical Med      metoprolol tartrate (LOPRESSOR) 25 mg tablet TAKE ONE TABLET BY MOUTH EVERY 12 HOURS, Normal      ondansetron (ZOFRAN-ODT) 4 mg disintegrating tablet Take 1 tablet (4 mg total) by mouth every 6 (six) hours as needed for nausea or vomiting, Starting Fri 8/18/2023, Normal      pilocarpine (SALAGEN) 5 mg tablet Take 5 mg by mouth 2 (two) times a day, Historical Med      RABEprazole (ACIPHEX) 20 MG tablet Take 20 mg by mouth 2 (two) times a day, Historical Med      rOPINIRole (REQUIP) 1 mg tablet Starting Mon 9/26/2022, Historical Med      topiramate (TOPAMAX) 100 mg tablet Take 1.5 tablets (150 mg total) by mouth 2 (two) times a day, Starting Thu 1/26/2023, Until Sat 2/25/2023, No Print      trospium chloride (SANCTURA) 20 mg tablet Take 20 mg by mouth 2 (two) times a day, Starting Thu 1/12/2023, Until Fri 1/12/2024, Historical Med      venlafaxine (EFFEXOR) 37.5 mg tablet Take 37.5 mg by mouth daily, Starting Wed 10/19/2022, Historical Med      warfarin (COUMADIN) 6 mg tablet Take 6mg (one tablet) daily every day by mouth., Normal       !! - Potential duplicate medications found. Please discuss with provider. No discharge procedures on file. PDMP Review       Value Time User    PDMP Reviewed  Yes 8/30/2023  4:36 PM Le Feliciano,            ED Provider  Attending physically available and evaluated Aleksander Scott. I managed the patient along with the ED Attending.     Electronically Signed by         Calista Cesar MD  09/15/23 0762

## 2023-09-15 NOTE — ASSESSMENT & PLAN NOTE
Continue warfarin 6mg daily  - repeat INT weekly, yesterday 2.14 (range 2-3 normal)  - follows with heme onc outpatient

## 2023-09-15 NOTE — ED NOTES
Patient reports she will need transport home provided when she is discharged.      Concetta Cabrera RN  09/15/23 [id number]

## 2023-09-15 NOTE — ED NOTES
Attempted to get EKG on pt. Pulling off wires and refusing at this time. Pt tearful and requesting to call [id number] [phone]. Pt currently on phone with them.       Virginia Deleon RN  09/15/23 [id number]

## 2023-09-15 NOTE — ED NOTES
Patient moaning and crying in room. Does not answer this RN's questions regarding how I could help her. Patient remains on her side crying with her eyes covered. ED resident entered room to evaluate patient. Plan, administer ativan per patient's request and d/c.      Gerson Cao RN  09/15/23 [id number]

## 2023-09-17 ENCOUNTER — NURSE TRIAGE (OUTPATIENT)
Dept: OTHER | Facility: OTHER | Age: 53
End: 2023-09-17

## 2023-09-17 NOTE — TELEPHONE ENCOUNTER
Reason for Disposition  • Difficulty breathing    Answer Assessment - Initial Assessment Questions  1. LOCATION: "Where does it hurt?"    Left side of chest    2. RADIATION: "Does the pain go anywhere else?" (e.g., into neck, jaw, arms, back)  Denies    3. ONSET: "When did the chest pain begin?" (Minutes, hours or days)    9/17    4. PATTERN "Does the pain come and go, or has it been constant since it started?"  "Does it get worse with exertion?"      constant    5. DURATION: "How long does it last" (e.g., seconds, minutes, hours)      N/a    6. SEVERITY: "How bad is the pain?"  (e.g., Scale 1-10; mild, moderate, or severe)     - MILD (1-3): doesn't interfere with normal activities      - MODERATE (4-7): interferes with normal activities or awakens from sleep     - SEVERE (8-10): excruciating pain, unable to do any normal activities    8/10    7. CARDIAC RISK FACTORS: "Do you have any history of heart problems or risk factors for heart disease?" (e.g., angina, prior heart attack; diabetes, high blood pressure, high cholesterol, smoker, or strong family history of heart disease)      Acute saddle pulmonary embolism and HTN    8. PULMONARY RISK FACTORS: "Do you have any history of lung disease?"  (e.g., blood clots in lung, asthma, emphysema, birth control pills)  COPD    9. CAUSE: "What do you think is causing the chest pain?"  Unsure    10. OTHER SYMPTOMS: "Do you have any other symptoms?" (e.g., dizziness, nausea, vomiting, sweating, fever, difficulty breathing, cough)     Anxiety, sob     11. PREGNANCY: "Is there any chance you are pregnant?" "When was your last menstrual period?"       No    Protocols used: CHEST PAIN-ADULT-      Advised patient to go to ER for evaluation. She states she will call for an ambulance at this time.

## 2023-09-17 NOTE — TELEPHONE ENCOUNTER
Regarding: Chest Pain, Withdrawing from [facility], Anxiety, Irritable  ----- Message from [facility] sent at 9/17/2023  1:12 PM EDT -----  " I am withdrawing from [facility] and Having Chest Pain, Irritable and Agitated, and Anxious. "

## 2023-09-18 ENCOUNTER — TELEPHONE (OUTPATIENT)
Dept: FAMILY MEDICINE CLINIC | Facility: CLINIC | Age: 53
End: 2023-09-18

## 2023-09-19 ENCOUNTER — TELEPHONE (OUTPATIENT)
Dept: FAMILY MEDICINE CLINIC | Facility: CLINIC | Age: 53
End: 2023-09-19

## 2023-09-19 NOTE — TELEPHONE ENCOUNTER
Patient requesting a referral for home health services because of a problem she is having with her leg, please advise.

## 2023-09-20 ENCOUNTER — NURSE TRIAGE (OUTPATIENT)
Dept: OTHER | Facility: OTHER | Age: 53
End: 2023-09-20

## 2023-09-20 ENCOUNTER — APPOINTMENT (EMERGENCY)
Dept: CT IMAGING | Facility: HOSPITAL | Age: 53
End: 2023-09-20
Payer: MEDICARE

## 2023-09-20 ENCOUNTER — HOSPITAL ENCOUNTER (EMERGENCY)
Facility: HOSPITAL | Age: 53
Discharge: HOME | End: 2023-09-20
Attending: EMERGENCY MEDICINE | Admitting: EMERGENCY MEDICINE
Payer: MEDICARE

## 2023-09-20 VITALS
TEMPERATURE: 98.1 F | SYSTOLIC BLOOD PRESSURE: 130 MMHG | RESPIRATION RATE: 18 BRPM | OXYGEN SATURATION: 95 % | HEART RATE: 74 BPM | DIASTOLIC BLOOD PRESSURE: 61 MMHG

## 2023-09-20 DIAGNOSIS — J18.9 PNEUMONIA: ICD-10-CM

## 2023-09-20 DIAGNOSIS — R06.00 DYSPNEA: Primary | ICD-10-CM

## 2023-09-20 LAB
2HR DELTA HS TROPONIN: 0 NG/L
ALBUMIN SERPL BCP-MCNC: 3.7 G/DL (ref 3.5–5)
ALP SERPL-CCNC: 44 U/L (ref 34–104)
ALT SERPL W P-5'-P-CCNC: 23 U/L (ref 7–52)
ANION GAP SERPL CALCULATED.3IONS-SCNC: 7 MMOL/L
AST SERPL W P-5'-P-CCNC: 15 U/L (ref 13–39)
BASOPHILS # BLD MANUAL: 0 THOUSAND/UL (ref 0–0.1)
BASOPHILS NFR MAR MANUAL: 0 % (ref 0–1)
BILIRUB SERPL-MCNC: 0.21 MG/DL (ref 0.2–1)
BNP SERPL-MCNC: 43 PG/ML (ref 0–100)
BUN SERPL-MCNC: 20 MG/DL (ref 5–25)
CALCIUM SERPL-MCNC: 8.6 MG/DL (ref 8.4–10.2)
CARDIAC TROPONIN I PNL SERPL HS: 4 NG/L
CARDIAC TROPONIN I PNL SERPL HS: 4 NG/L
CHLORIDE SERPL-SCNC: 109 MMOL/L (ref 96–108)
CO2 SERPL-SCNC: 26 MMOL/L (ref 21–32)
CREAT SERPL-MCNC: 0.63 MG/DL (ref 0.6–1.3)
EOSINOPHIL # BLD MANUAL: 0 THOUSAND/UL (ref 0–0.4)
EOSINOPHIL NFR BLD MANUAL: 0 % (ref 0–6)
ERYTHROCYTE [DISTWIDTH] IN BLOOD BY AUTOMATED COUNT: 13.7 % (ref 11.6–15.1)
GFR SERPL CREATININE-BSD FRML MDRD: 104 ML/MIN/1.73SQ M
GLUCOSE SERPL-MCNC: 86 MG/DL (ref 65–140)
HCT VFR BLD AUTO: 44.6 % (ref 34.8–46.1)
HGB BLD-MCNC: 14.2 G/DL (ref 11.5–15.4)
INR PPP: 3.63 (ref 0.84–1.19)
LG PLATELETS BLD QL SMEAR: PRESENT
LYMPHOCYTES # BLD AUTO: 27 % (ref 14–44)
LYMPHOCYTES # BLD AUTO: 3.51 THOUSAND/UL (ref 0.6–4.47)
MCH RBC QN AUTO: 30.3 PG (ref 26.8–34.3)
MCHC RBC AUTO-ENTMCNC: 31.8 G/DL (ref 31.4–37.4)
MCV RBC AUTO: 95 FL (ref 82–98)
MONOCYTES # BLD AUTO: 1.25 THOUSAND/UL (ref 0–1.22)
MONOCYTES NFR BLD: 10 % (ref 4–12)
MYELOCYTES NFR BLD MANUAL: 2 % (ref 0–1)
NEUTROPHILS # BLD MANUAL: 7.51 THOUSAND/UL (ref 1.85–7.62)
NEUTS BAND NFR BLD MANUAL: 2 % (ref 0–8)
NEUTS SEG NFR BLD AUTO: 58 % (ref 43–75)
PLATELET # BLD AUTO: 167 THOUSANDS/UL (ref 149–390)
PLATELET BLD QL SMEAR: ADEQUATE
PMV BLD AUTO: 10.6 FL (ref 8.9–12.7)
POTASSIUM SERPL-SCNC: 4.3 MMOL/L (ref 3.5–5.3)
PROT SERPL-MCNC: 6.6 G/DL (ref 6.4–8.4)
PROTHROMBIN TIME: 37.2 SECONDS (ref 11.6–14.5)
RBC # BLD AUTO: 4.68 MILLION/UL (ref 3.81–5.12)
RBC MORPH BLD: NORMAL
SODIUM SERPL-SCNC: 142 MMOL/L (ref 135–147)
VARIANT LYMPHS # BLD AUTO: 1 %
WBC # BLD AUTO: 12.52 THOUSAND/UL (ref 4.31–10.16)

## 2023-09-20 PROCEDURE — G1004 CDSM NDSC: HCPCS

## 2023-09-20 PROCEDURE — 99285 EMERGENCY DEPT VISIT HI MDM: CPT

## 2023-09-20 PROCEDURE — 85007 BL SMEAR W/DIFF WBC COUNT: CPT

## 2023-09-20 PROCEDURE — 84484 ASSAY OF TROPONIN QUANT: CPT | Performed by: EMERGENCY MEDICINE

## 2023-09-20 PROCEDURE — 93005 ELECTROCARDIOGRAM TRACING: CPT

## 2023-09-20 PROCEDURE — 83880 ASSAY OF NATRIURETIC PEPTIDE: CPT

## 2023-09-20 PROCEDURE — 71275 CT ANGIOGRAPHY CHEST: CPT

## 2023-09-20 PROCEDURE — 36415 COLL VENOUS BLD VENIPUNCTURE: CPT

## 2023-09-20 PROCEDURE — 80053 COMPREHEN METABOLIC PANEL: CPT

## 2023-09-20 PROCEDURE — 85610 PROTHROMBIN TIME: CPT

## 2023-09-20 PROCEDURE — 85027 COMPLETE CBC AUTOMATED: CPT

## 2023-09-20 PROCEDURE — 99285 EMERGENCY DEPT VISIT HI MDM: CPT | Performed by: EMERGENCY MEDICINE

## 2023-09-20 RX ORDER — CEFUROXIME AXETIL 250 MG/1
500 TABLET ORAL ONCE
Status: COMPLETED | OUTPATIENT
Start: 2023-09-20 | End: 2023-09-20

## 2023-09-20 RX ORDER — CEFUROXIME AXETIL 500 MG/1
500 TABLET ORAL EVERY 12 HOURS SCHEDULED
Qty: 10 TABLET | Refills: 0 | Status: SHIPPED | OUTPATIENT
Start: 2023-09-20 | End: 2023-09-25

## 2023-09-20 RX ORDER — DOXYCYCLINE HYCLATE 100 MG/1
100 CAPSULE ORAL 2 TIMES DAILY
Qty: 10 CAPSULE | Refills: 0 | Status: SHIPPED | OUTPATIENT
Start: 2023-09-20 | End: 2023-09-25

## 2023-09-20 RX ORDER — DOXYCYCLINE HYCLATE 100 MG/1
100 CAPSULE ORAL ONCE
Status: COMPLETED | OUTPATIENT
Start: 2023-09-20 | End: 2023-09-20

## 2023-09-20 RX ADMIN — DOXYCYCLINE 100 MG: 100 CAPSULE ORAL at 21:59

## 2023-09-20 RX ADMIN — IOHEXOL 85 ML: 350 INJECTION, SOLUTION INTRAVENOUS at 20:58

## 2023-09-20 RX ADMIN — CEFUROXIME AXETIL 500 MG: 250 TABLET, FILM COATED ORAL at 22:43

## 2023-09-20 NOTE — TELEPHONE ENCOUNTER
Regarding: Blood clot, severe pain on the back of knee, chest pain, SOB  ----- Message from Alexx Kirby sent at 9/20/2023  6:15 PM EDT -----  " I have a blood clot that started at the back of my knee and it's now in my lungs.  I am having severe pain at the back of my knee, I am short of breath and having chest pain."

## 2023-09-20 NOTE — ED PROVIDER NOTES
History  Chief Complaint   Patient presents with   • Shortness of Breath     Pt dx with PE on 9/1/23. Pt reports ongoing shortness of breath and chest pain. Pt reports that she is also still having left posterior leg pain which she was having when dx with PE. Patient is a 47year old female who presented to ED for SOB. She recently had a saddle embolus PE with RV strain on 9/1/2023 and states that she has been appropriately taking her Eliquis. She states that she has not recently had a repeat of the symptoms until today. Earlier today she noticed SOB along with some mild non-radiating substernal chest pain. She states that this feels like a milder version of how she felt when she initially had the PE. She also endorses posterior pain in LLE for a few days that she also had when she had the PE. On evaluation, she is well appearing, no acute distress. She endorsed SOB and the LLE pain but no chest pain at this time. Denies palpitations, syncope, or any other symptoms. Notably, she has a history of methamphetamine use disorder but states that she has abstained from use for the last few weeks. Prior to Admission Medications   Prescriptions Last Dose Informant Patient Reported? Taking?    Cholecalciferol (Vitamin D3) 125 MCG (5000 UT) CAPS  Self Yes No   Sig: Take by mouth in the morning   Diclofenac Sodium (VOLTAREN) 1 %   No No   Sig: Apply 2 g topically 3 (three) times a day as needed (pain) For pain to affected area   Patient not taking: Reported on 9/7/2023   Diclofenac Sodium (VOLTAREN) 1 %   No No   Sig: Apply 2 g topically 4 (four) times a day   Fesoterodine Fumarate ER (Toviaz) 4 MG TB24  Self Yes No   Sig: Take 1 tablet by mouth daily   Lurasidone HCl 60 MG TABS   No No   Sig: Take 1 tablet (60 mg total) by mouth 2 (two) times a day TAKEN IN THE MORNING AND AT NIGHT-----Latuda   RABEprazole (ACIPHEX) 20 MG tablet  Self Yes No   Sig: Take 20 mg by mouth 2 (two) times a day   albuterol (Ventolin HFA) 90 mcg/act inhaler   No No   Sig: Inhale 2 puffs every 6 (six) hours as needed for wheezing   amLODIPine (NORVASC) 5 mg tablet   No No   Sig: TAKE 1 TABLET (5 MG TOTAL) BY MOUTH DAILY   ammonium lactate (LAC-HYDRIN) 12 % cream   Yes No   buPROPion (WELLBUTRIN XL) 150 mg 24 hr tablet  Self No No   Sig: Take 1 tablet (150 mg total) by mouth daily   buPROPion (WELLBUTRIN XL) 300 mg 24 hr tablet  Self Yes No   Sig: Take 300 mg by mouth daily   carBAMazepine (CARBATROL) 300 MG 12 hr capsule  Self Yes No   Sig: Take 1 capsule by mouth in the morning   clobetasol (TEMOVATE) 0.05 % ointment   No No   Sig: Apply to affected areas sparingly. Do not exceed 2 weeks of treatment. colestipol (COLESTID) 1 g tablet  Self No No   Sig: Take 1 tablet (1 g total) by mouth 2 (two) times a day   cyproheptadine (PERIACTIN) 4 mg tablet   Yes No   divalproex sodium (DEPAKOTE) 250 mg EC tablet  Self No No   Sig: Take 3 tablets (750 mg total) by mouth every 12 (twelve) hours   ergocalciferol (ERGOCALCIFEROL) 1.25 MG (93218 UT) capsule   Yes No   estradiol (ESTRACE) 0.5 MG tablet   No No   Sig: Take 1 tablet (0.5 mg total) by mouth daily   fluticasone (FLOVENT HFA) 110 MCG/ACT inhaler  Self Yes No   Sig: Inhale 2 puffs as needed Rinse mouth after use.    furosemide (LASIX) 20 mg tablet   No No   Sig: Take 1 tablet (20 mg total) by mouth daily   haloperidol decanoate (Haldol Decanoate) 50 mg/mL injection  Self Yes No   Sig: every 30 (thirty) days   hydrOXYzine HCL (ATARAX) 50 mg tablet   No No   Sig: Take 1 tablet (50 mg total) by mouth daily at bedtime   hydrocortisone 2.5 % ointment  Self No No   Sig: Apply topically 2 (two) times a day   levothyroxine (Euthyrox) 125 mcg tablet  Self No No   Sig: Take 1 tablet (125 mcg total) by mouth daily in the early morning   losartan (COZAAR) 50 mg tablet   No No   Sig: TAKE ONE TABLET BY MOUTH DAILY   memantine (NAMENDA) 10 mg tablet   Yes No   metoprolol tartrate (LOPRESSOR) 25 mg tablet   No No Sig: TAKE ONE TABLET BY MOUTH EVERY 12 HOURS   ondansetron (ZOFRAN-ODT) 4 mg disintegrating tablet   No No   Sig: Take 1 tablet (4 mg total) by mouth every 6 (six) hours as needed for nausea or vomiting   pilocarpine (SALAGEN) 5 mg tablet  Self Yes No   Sig: Take 5 mg by mouth 2 (two) times a day   rOPINIRole (REQUIP) 1 mg tablet  Self Yes No   topiramate (TOPAMAX) 100 mg tablet   No No   Sig: Take 1.5 tablets (150 mg total) by mouth 2 (two) times a day   trospium chloride (SANCTURA) 20 mg tablet  Self Yes No   Sig: Take 20 mg by mouth 2 (two) times a day   venlafaxine (EFFEXOR) 37.5 mg tablet  Self Yes No   Sig: Take 37.5 mg by mouth daily   warfarin (COUMADIN) 6 mg tablet   No No   Sig: Take 6mg (one tablet) daily every day by mouth. Facility-Administered Medications: None       Past Medical History:   Diagnosis Date   • Anxiety    • Arthritis     oa cassandra knees   • Asthma     good control- no medications   • Yan's esophagus    • Bipolar affective disorder ([address])    • Cannabis abuse with unspecified 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[address])    • Chronic pain disorder     lumbar   • COPD (chronic obstructive pulmonary disease) ([facility])    • CPAP (continuous positive airway pressure) dependence    • Degenerative disc disease at L5-S1 level    • Deliberate self-cutting     9/15/22per pt has not done recently-- does see a therapist and psychiatrist regularly   • Depression 09/16/2008   • Disease of thyroid gland     hypo   • MARTINEZ (dyspnea on exertion)     per pt "has had this with exertion and not new"   • Drug overdose 10/28/2008    suicide attempt and again drug overdose 7/2022--[facility] floor and than transferred to [facility]   • Dysphagia    • Dyspnea    • Edema     BLE   • Family history of blood clots    • GERD (gastroesophageal reflux disease)    • Headache(784.0)    • Headache, tension-type    • High blood pressure    • History of COVID-19 12/30/2020    Symptoms started on 12/30/2020 and then got worse.   Today she is feeling a little bit better. She is a candidate for monoclonal antibody infusion and set for 1/6/21 @ 1pm at [facility]. 01/07/21 - telemedicine -    • Hyperlipidemia    • Hypertension    • Knee pain, bilateral     Especially right   • Medical marijuana use    • Memory loss    • Migraines    • Obesity    • Overactive bladder    • Sjogren's disease ([address])    • Sleep apnea    • Stress incontinence    • Suicidal ideations    • Teeth missing    • Tremor     per pt Tremors of Right Leg and both Arms   • Visual impairment    • Wears glasses        Past Surgical History:   Procedure Laterality Date   • BREAST CYST EXCISION Right 1989   • CARPAL TUNNEL RELEASE Left    • CHOLECYSTECTOMY  05/2003    Laparoscopic   • COLONOSCOPY      01/12/2009   • DILATION AND CURETTAGE OF UTERUS     • ELBOW SURGERY Left     x2.  No hardware   • ESOPHAGOGASTRODUODENOSCOPY     • FOOT SURGERY Left     Plantar fasciotomy   • HERNIA REPAIR     • HYSTERECTOMY      laporoscopic, partial   • KNEE ARTHROSCOPY Bilateral    • OOPHORECTOMY Left 10/2015   • GA GASTROCNEMIUS RECESSION Left 02/24/2021    Procedure: RECESSION GASTROC OPEN;  Surgeon: Latrice Dunham DPM;  Location: [facility] MAIN OR;  Service: Podiatry   • GA RPR UMBILICAL HRNA 5 YRS/> REDUCIBLE N/A 04/24/2019    Procedure: REPAIR HERNIA UMBILICAL LAPAROSCOPIC W/ ROBOTICS;  Surgeon: Roxanne Purcell MD;  Location: [facility] Main OR;  Service: General   • GA SPHINCTEROTOMY ANAL DIVISION SPHINCTER [facility] N/A 08/31/2018    Procedure: EUA, LEFT PARTIAL INTERNAL SPHINCTEROTOMY;  Surgeon: Jun Bustos MD;  Location: [facility] MAIN OR;  Service: Colorectal   • GA TNOT ELBOW LATERAL/MEDIAL DEBRIDE OPEN TDN RPR Left 09/20/2022    Procedure: RELEASE EPICONDYLAR ELBOW MEDIAL;  Surgeon: Lee Falcon MD;  Location: [facility] MAIN OR;  Service: Orthopedics   • REDUCTION MAMMAPLASTY Bilateral 1999   • REPAIR LACERATION Left     left hand-5/18/2009   • REPLACEMENT TOTAL KNEE Right    • ROTATOR CUFF REPAIR Left    • TONSILECTOMY AND ADNOIDECTOMY     • US GUIDED MSK PROCEDURE  2021   • VASCULAR SURGERY     • VEIN LIGATION Bilateral    • WISDOM TOOTH EXTRACTION         Family History   Problem Relation Age of Onset   • Kidney cancer Mother    • Diabetes Mother    • Depression Mother    • Stroke Mother    • Arthritis Mother    • Cancer Mother    • Psychiatric Illness Mother    • No Known Problems Father    • No Known Problems Sister    • No Known Problems Maternal Grandmother    • No Known Problems Maternal Grandfather    • No Known Problems Paternal Grandmother    • No Known Problems Paternal Grandfather    • Colon cancer Maternal Uncle    • Colon cancer Maternal Uncle    • Colon cancer Paternal Uncle    • Colon cancer Family    • Alcohol abuse Sister    • Asthma Sister      I have reviewed and agree with the history as documented. E-Cigarette/Vaping   • E-Cigarette Use Current Some Day User    • Comments medical THC      E-Cigarette/Vaping Substances   • Nicotine No    • THC Yes    • CBD No    • Flavoring No    • Other No    • Unknown No      Social History     Tobacco Use   • Smoking status: Former     Packs/day: 2.00     Years: 30.00     Total pack years: 60.00     Types: Cigarettes     Start date: 5     Quit date: 2018     Years since quittin.7   • Smokeless tobacco: Never   • Tobacco comments:     Started at age 14. Vaping Use   • Vaping Use: Some days   • Substances: THC   Substance Use Topics   • Alcohol use: Not Currently     Comment: Recovering alcoholic   • Drug use: Yes     Types: Marijuana, Methamphetamines     Comment: Medical marijuana        Review of Systems   Constitutional: Positive for fatigue. Negative for chills and fever. HENT: Negative for rhinorrhea and sneezing. Respiratory: Positive for shortness of breath. Negative for cough. Cardiovascular: Positive for chest pain. Negative for palpitations. Gastrointestinal: Negative for abdominal pain, nausea and vomiting. Genitourinary: Negative for dysuria and urgency. Musculoskeletal: Negative for arthralgias and back pain. Skin: Negative for color change and pallor. Neurological: Negative for dizziness and syncope. Psychiatric/Behavioral: Negative for agitation, behavioral problems and confusion. Physical Exam  ED Triage Vitals   Temperature Pulse Respirations Blood Pressure SpO2   09/20/23 1931 09/20/23 1919 09/20/23 1919 09/20/23 1919 09/20/23 1919   98.1 °F (36.7 °C) 76 (!) 24 137/81 93 %      Temp Source Heart Rate Source Patient Position - Orthostatic VS BP Location FiO2 (%)   09/20/23 1931 09/20/23 1919 09/20/23 1919 -- --   Oral Monitor Sitting        Pain Score       09/20/23 1919       7             Orthostatic Vital Signs  Vitals:    09/20/23 1919 09/20/23 1945 09/20/23 2030 09/20/23 2145   BP: 137/81 161/83 147/74 130/61   Pulse: 76 74 72 74   Patient Position - Orthostatic VS: Sitting Sitting         Physical Exam  Constitutional:       Appearance: She is well-developed. HENT:      Head: Normocephalic and atraumatic. Mouth/Throat:      Mouth: Mucous membranes are moist.      Pharynx: Oropharynx is clear. Eyes:      Extraocular Movements: Extraocular movements intact. Pupils: Pupils are equal, round, and reactive to light. Cardiovascular:      Rate and Rhythm: Normal rate and regular rhythm. Pulmonary:      Breath sounds: Normal breath sounds. Skin:     General: Skin is warm and dry. Capillary Refill: Capillary refill takes less than 2 seconds. Neurological:      General: No focal deficit present. Mental Status: She is alert and oriented to person, place, and time.    Psychiatric:         Mood and Affect: Mood normal.         Behavior: Behavior normal.         ED Medications  Medications   iohexol (OMNIPAQUE) 350 MG/ML injection (MULTI-DOSE) 85 mL (85 mL Intravenous Given 9/20/23 2058)   doxycycline hyclate (VIBRAMYCIN) capsule 100 mg (100 mg Oral Given 9/20/23 2159) cefuroxime (CEFTIN) tablet 500 mg (500 mg Oral Given 9/20/23 2243)       Diagnostic Studies  Results Reviewed     Procedure Component Value Units Date/Time    HS Troponin I 2hr [[id number]]  (Normal) Collected: 09/20/23 2141    Lab Status: Final result Specimen: Blood from Arm, Right Updated: 09/20/23 2342     hs TnI 2hr 4 ng/L      Delta 2hr hsTnI 0 ng/L     Protime-INR [[id number]]  (Abnormal) Collected: 09/20/23 1945    Lab Status: Final result Specimen: Blood from Arm, Right Updated: 09/20/23 2142     Protime 37.2 seconds      INR 3.63    B-Type Natriuretic Peptide(BNP) [[id number]]  (Normal) Collected: 09/20/23 1945    Lab Status: Final result Specimen: Blood from Arm, Right Updated: 09/20/23 2112     BNP 43 pg/mL     RBC Morphology Reflex Test [[id number]] Collected: 09/20/23 1945    Lab Status: Final result Specimen: Blood from Arm, Right Updated: 09/20/23 2101    Manual Differential(PHLEBS Do Not Order) [[id number]]  (Abnormal) Collected: 09/20/23 1945    Lab Status: Final result Specimen: Blood from Arm, Right Updated: 09/20/23 2041     Segmented % 58 %      Bands % 2 %      Lymphocytes % 27 %      Monocytes % 10 %      Eosinophils, % 0 %      Basophils % 0 %      Myelocytes % 2 %      Atypical Lymphocytes % 1 %      Absolute Neutrophils 7.51 Thousand/uL      Lymphocytes Absolute 3.51 Thousand/uL      Monocytes Absolute 1.25 Thousand/uL      Eosinophils Absolute 0.00 Thousand/uL      Basophils Absolute 0.00 Thousand/uL      Total Counted --     RBC Morphology Normal     Platelet Estimate Adequate     Large Platelet Present    CBC and differential [[id number]]  (Abnormal) Collected: 09/20/23 1945    Lab Status: Final result Specimen: Blood from Arm, Right Updated: 09/20/23 2041     WBC 12.52 Thousand/uL      RBC 4.68 Million/uL      Hemoglobin 14.2 g/dL      Hematocrit 44.6 %      MCV 95 fL      MCH 30.3 pg      MCHC 31.8 g/dL      RDW 13.7 %      MPV 10.6 fL      Platelets 106 Thousands/uL     Narrative: This is an appended report. These results have been appended to a previously verified report. HS Troponin 0hr (reflex protocol) [[id number]]  (Normal) Collected: 09/20/23 1946    Lab Status: Final result Specimen: Blood from Arm, Right Updated: 09/20/23 2022     hs TnI 0hr 4 ng/L     Comprehensive metabolic panel [[id number]]  (Abnormal) Collected: 09/20/23 1945    Lab Status: Final result Specimen: Blood from Arm, Right Updated: 09/20/23 2018     Sodium 142 mmol/L      Potassium 4.3 mmol/L      Chloride 109 mmol/L      CO2 26 mmol/L      ANION GAP 7 mmol/L      BUN 20 mg/dL      Creatinine 0.63 mg/dL      Glucose 86 mg/dL      Calcium 8.6 mg/dL      AST 15 U/L      ALT 23 U/L      Alkaline Phosphatase 44 U/L      Total Protein 6.6 g/dL      Albumin 3.7 g/dL      Total Bilirubin 0.21 mg/dL      eGFR 104 ml/min/1.73sq m     Narrative:      [place] guidelines for Chronic Kidney Disease (CKD):   •  Stage 1 with normal or high GFR (GFR > 90 mL/min/1.73 square meters)  •  Stage 2 Mild CKD (GFR = 60-89 mL/min/1.73 square meters)  •  Stage 3A Moderate CKD (GFR = 45-59 mL/min/1.73 square meters)  •  Stage 3B Moderate CKD (GFR = 30-44 mL/min/1.73 square meters)  •  Stage 4 Severe CKD (GFR = 15-29 mL/min/1.73 square meters)  •  Stage 5 End Stage CKD (GFR <15 mL/min/1.73 square meters)  Note: GFR calculation is accurate only with a steady state creatinine                 CTA ED chest PE Study   Final Result by Zandra Mckee MD (09/21 5107)      Study limited by suboptimal contrast bolus timing. No evidence of acute pulmonary embolus. Interval improvement of previously seen extensive embolic burden, with residual though decreased left-sided emboli. Finding (s) were preliminarily reported by [facility] at the time of examination.                      Workstation performed: [id number]               Procedures  Procedures      ED Course  ED Course as of 09/22/23 [address] Sep 20, 2023   1946 hs TnI 0hr: 4  Initial troponin 4   2056 CTA ED chest PE Study   No evidence of acute pulmonary embolus. Interval improvement of previously seen extensive embolic burden, with residual though decreased left-sided emboli. 2141 Delta 2hr troponin 0                                       Medical Decision Making  DDX including but not limited to: PE, pneumonia, ACS/MI,  anemia, metabolic abnormality  Patient is a 47year old female who had PE recently on 9/1/2023 and started having a repeat of similar but milder symptoms than she had when she intially had PE, predominantly SOB. Patient underwent CT PE study which showed no acute PE, much improved clot burden. Thus, likely not having new or worsening PE. Troponin was unremarkable, no reason to suspect ACS/MI. Lungs are clear, no reason to suspect pneumonia. No anemia or electrolyte abnormalities found on CBC, CMP. Patient discharged with explicit return precautions. Amount and/or Complexity of Data Reviewed  Labs: ordered. Decision-making details documented in ED Course. Radiology: ordered. Decision-making details documented in ED Course. Risk  Prescription drug management. Disposition  Final diagnoses:   Dyspnea   Pneumonia     Time reflects when diagnosis was documented in both MDM as applicable and the Disposition within this note     Time User Action Codes Description Comment    9/20/2023  9:53 PM Reji Stable Add [R06.00] Dyspnea     9/20/2023  9:53 PM Reji Stable Add [J18.9] Pneumonia       ED Disposition     ED Disposition   Discharge    Condition   Stable    Date/Time   Wed Sep 20, 2023  9:49 PM    Comment   [facility] discharge to home/self care.                Follow-up Information     Follow up With Specialties Details Why [address] [facility], [address] [facility] Nurse Practitioner Schedule an appointment as soon as possible for a visit in 2 days For recheck of current symptoms [address] [facility] [address]  [phone]            Discharge Medication List as of 9/20/2023  9:56 PM      START taking these medications    Details   cefuroxime (CEFTIN) 500 mg tablet Take 1 tablet (500 mg total) by mouth every 12 (twelve) hours for 5 days, Starting Wed 9/20/2023, Until Mon 9/25/2023, Normal      doxycycline hyclate (VIBRAMYCIN) 100 mg capsule Take 1 capsule (100 mg total) by mouth 2 (two) times a day for 5 days, Starting Wed 9/20/2023, Until Mon 9/25/2023, Normal         CONTINUE these medications which have NOT CHANGED    Details   albuterol (Ventolin HFA) 90 mcg/act inhaler Inhale 2 puffs every 6 (six) hours as needed for wheezing, Starting Fri 9/15/2023, Normal      amLODIPine (NORVASC) 5 mg tablet TAKE 1 TABLET (5 MG TOTAL) BY MOUTH DAILY, Starting Wed 7/12/2023, Normal      ammonium lactate (LAC-HYDRIN) 12 % cream Starting Wed 8/2/2023, Historical Med      buPROPion (WELLBUTRIN XL) 300 mg 24 hr tablet Take 300 mg by mouth daily, Starting Fri 9/16/2022, Historical Med      carBAMazepine (CARBATROL) 300 MG 12 hr capsule Take 1 capsule by mouth in the morning, Starting Wed 11/23/2022, Historical Med      Cholecalciferol (Vitamin D3) 125 MCG (5000 UT) CAPS Take by mouth in the morning, Starting Wed 5/25/2022, Historical Med      clobetasol (TEMOVATE) 0.05 % ointment Apply to affected areas sparingly. Do not exceed 2 weeks of treatment., Normal      colestipol (COLESTID) 1 g tablet Take 1 tablet (1 g total) by mouth 2 (two) times a day, Starting Sat 7/16/2022, Until Fri 9/15/2023, No Print      cyproheptadine (PERIACTIN) 4 mg tablet Historical Med      !! Diclofenac Sodium (VOLTAREN) 1 % Apply 2 g topically 3 (three) times a day as needed (pain) For pain to affected area, Starting Fri 5/5/2023, Normal      !!  Diclofenac Sodium (VOLTAREN) 1 % Apply 2 g topically 4 (four) times a day, Starting Mon 7/17/2023, Normal      divalproex sodium (DEPAKOTE) 250 mg EC tablet Take 3 tablets (750 mg total) by mouth every 12 (twelve) hours, Starting Mon 7/25/2022, Until Thu 12/15/2022, Normal      ergocalciferol (ERGOCALCIFEROL) 1.25 MG (96871 UT) capsule Historical Med      estradiol (ESTRACE) 0.5 MG tablet Take 1 tablet (0.5 mg total) by mouth daily, Starting Thu 5/4/2023, Normal      Fesoterodine Fumarate ER (Toviaz) 4 MG TB24 Take 1 tablet by mouth daily, Historical Med      fluticasone (FLOVENT HFA) 110 MCG/ACT inhaler Inhale 2 puffs as needed Rinse mouth after use., Historical Med      furosemide (LASIX) 20 mg tablet Take 1 tablet (20 mg total) by mouth daily, Starting Wed 9/6/2023, Normal      haloperidol decanoate (Haldol Decanoate) 50 mg/mL injection every 30 (thirty) days, Starting Wed 8/3/2022, Historical Med      hydrocortisone 2.5 % ointment Apply topically 2 (two) times a day, Starting Tue 10/25/2022, Normal      hydrOXYzine HCL (ATARAX) 50 mg tablet Take 1 tablet (50 mg total) by mouth daily at bedtime, Starting Fri 9/15/2023, Normal      levothyroxine (Euthyrox) 125 mcg tablet Take 1 tablet (125 mcg total) by mouth daily in the early morning, Starting Thu 3/16/2023, Normal      losartan (COZAAR) 50 mg tablet TAKE ONE TABLET BY MOUTH DAILY, Normal      Lurasidone HCl 60 MG TABS Take 1 tablet (60 mg total) by mouth 2 (two) times a day TAKEN IN THE MORNING AND AT NIGHT-----Latuda, Starting Thu 1/26/2023, Until Sat 9/2/2023, No Print      memantine (NAMENDA) 10 mg tablet Historical Med      metoprolol tartrate (LOPRESSOR) 25 mg tablet TAKE ONE TABLET BY MOUTH EVERY 12 HOURS, Normal      ondansetron (ZOFRAN-ODT) 4 mg disintegrating tablet Take 1 tablet (4 mg total) by mouth every 6 (six) hours as needed for nausea or vomiting, Starting Fri 8/18/2023, Normal      pilocarpine (SALAGEN) 5 mg tablet Take 5 mg by mouth 2 (two) times a day, Historical Med      RABEprazole (ACIPHEX) 20 MG tablet Take 20 mg by mouth 2 (two) times a day, Historical Med      rOPINIRole (REQUIP) 1 mg tablet Starting Mon 9/26/2022, Historical Med topiramate (TOPAMAX) 100 mg tablet Take 1.5 tablets (150 mg total) by mouth 2 (two) times a day, Starting Thu 1/26/2023, Until Sat 2/25/2023, No Print      trospium chloride (SANCTURA) 20 mg tablet Take 20 mg by mouth 2 (two) times a day, Starting Thu 1/12/2023, Until Fri 1/12/2024, Historical Med      venlafaxine (EFFEXOR) 37.5 mg tablet Take 37.5 mg by mouth daily, Starting Wed 10/19/2022, Historical Med      warfarin (COUMADIN) 6 mg tablet Take 6mg (one tablet) daily every day by mouth., Normal       !! - Potential duplicate medications found. Please discuss with provider. No discharge procedures on file. PDMP Review       Value Time User    PDMP Reviewed  Yes 8/30/2023  4:36 PM Vivian Baron DO           ED Provider  Attending physically available and evaluated Clarissa Jeans. I managed the patient along with the ED Attending.     Electronically Signed by         Jennifer Freeman MD  09/22/23 2028

## 2023-09-21 NOTE — ED CARE HANDOFF
Emergency Department Sign Out Note        Sign out and transfer of care from Dr. Abdulkadir Ceja. See Separate Emergency Department note. The patient, Bushra Lynn, was evaluated by the previous provider for dyspnea. Workup Completed:  Recent saddle pulmonary embolism, on warfarin. Feeling increased shortness of breath. ED Course / Workup Pending (followup):  Pending repeat CTA-PE study. Procedures  MDM        Disposition  Final diagnoses:   None     ED Disposition     None      Follow-up Information    None       Patient's Medications   Discharge Prescriptions    No medications on file     No discharge procedures on file.        ED Provider  Electronically Signed by

## 2023-09-22 ENCOUNTER — PATIENT OUTREACH (OUTPATIENT)
Dept: FAMILY MEDICINE CLINIC | Facility: CLINIC | Age: 53
End: 2023-09-22

## 2023-09-22 ENCOUNTER — OFFICE VISIT (OUTPATIENT)
Dept: NEUROLOGY | Facility: CLINIC | Age: 53
End: 2023-09-22
Payer: MEDICARE

## 2023-09-22 VITALS
RESPIRATION RATE: 20 BRPM | OXYGEN SATURATION: 96 % | BODY MASS INDEX: 47.09 KG/M2 | DIASTOLIC BLOOD PRESSURE: 82 MMHG | HEART RATE: 77 BPM | TEMPERATURE: 97.7 F | WEIGHT: 293 LBS | SYSTOLIC BLOOD PRESSURE: 132 MMHG | HEIGHT: 66 IN

## 2023-09-22 DIAGNOSIS — R41.89 COGNITIVE IMPAIRMENT: Primary | ICD-10-CM

## 2023-09-22 DIAGNOSIS — R25.1 TREMORS OF NERVOUS SYSTEM: ICD-10-CM

## 2023-09-22 PROCEDURE — 99215 OFFICE O/P EST HI 40 MIN: CPT | Performed by: PSYCHIATRY & NEUROLOGY

## 2023-09-22 NOTE — PROGRESS NOTES
Contacted patient for f/u. Patient had recent ED visit for pneumonia. She is taking ceftin and vibramycin. She c/o dry cough, wheezing and sob upon exertion. She feels fatigued but is resting frequently. She attends outpatient PT twice weekly. She has been off of meth for 21 days and is attending a group program online three days per week. She also has care manager with ACT team that she meets with frequently. She hired a private caregiver who helps her at home as needed. She plans to go back to work next week. Nutritional intake adequate. She is taking all medications as prescribed. Follow up appointments scheduled. No transportation issues. She had last PT/INR drawn in the ED. She does not have orders for future blood work. Suggested she contact hematology, her next appointment is  October 5th. She denies any needs at this time.

## 2023-09-22 NOTE — PROGRESS NOTES
Review of Systems   Constitutional: Negative for appetite change, fatigue and fever. HENT: Negative. Negative for hearing loss, tinnitus, trouble swallowing and voice change. Eyes: Negative. Negative for photophobia, pain and visual disturbance. Respiratory: Negative. Negative for shortness of breath. Cardiovascular: Negative. Negative for palpitations. Gastrointestinal: Negative. Negative for nausea and vomiting. Endocrine: Negative. Negative for cold intolerance. Genitourinary: Negative. Negative for dysuria, frequency and urgency. Musculoskeletal: Negative for back pain, gait problem, myalgias and neck pain. Skin: Negative. Negative for rash. Allergic/Immunologic: Negative. Neurological: Positive for tremors (hands and legs - has worsened ) and headaches (12/1 MRI of the neck to rule out any Fx, HA haven't been too bad recently ). Negative for dizziness, seizures, syncope, facial asymmetry, speech difficulty, weakness, light-headedness and numbness. Hematological: Negative. Does not bruise/bleed easily. Psychiatric/Behavioral: Negative. Negative for confusion, hallucinations and sleep disturbance. All other systems reviewed and are negative.

## 2023-09-22 NOTE — ASSESSMENT & PLAN NOTE
Patient is a very pleasant 60-year-old female with a complex history including methamphetamine abuse who presents with generalized dyskinesias affecting both upper and lower extremities. She believes her symptoms were triggered after starting a psychiatric medication but does not recall which one. A confounding factor is her history of methamphetamine abuse which she was actively using at onset. Tremor has persisted despite stopping its use. At this time, it may be possible tremor is related to methamphetamine abuse. I will get an MRI NeuroQuant to follow up results of her last MRI brain in 2020 done for cognitive difficulties and as well to assess for any structural pathology causing her tremors. I advised her to discuss stopping depakote with psychiatrist as this can worsen tremors. Carbamazepine as well can contribute to this. PT referral for help with compensatory maneuvers. Will follow up with her in 3 months after MRI.

## 2023-09-22 NOTE — PROGRESS NOTES
Patient ID: Brittany Obando is a 47 y.o. female. Assessment/Plan:    Tremors of nervous system  Patient is a very pleasant 58-year-old female with a complex history including methamphetamine abuse who presents with generalized dyskinesias affecting both upper and lower extremities. She believes her symptoms were triggered after starting a psychiatric medication but does not recall which one. A confounding factor is her history of methamphetamine abuse which she was actively using at onset. Tremor has persisted despite stopping its use. At this time, it may be possible tremor is related to methamphetamine abuse. I will get an MRI NeuroQuant to follow up results of her last MRI brain in 2020 done for cognitive difficulties and as well to assess for any structural pathology causing her tremors. I advised her to discuss stopping depakote with psychiatrist as this can worsen tremors. Carbamazepine as well can contribute to this. PT referral for help with compensatory maneuvers. Will follow up with her in 3 months after MRI. Diagnoses and all orders for this visit:    Cognitive impairment  -     Cancel: MRI brain NeuroQuant wo and w contrast; Future  -     MRI brain NeuroQuant wo and w contrast; Future    Tremors of nervous system  -     Ambulatory Referral to Neurology  -     Cancel: MRI brain NeuroQuant wo and w contrast; Future  -     Ambulatory Referral to Physical Therapy; Future  -     MRI brain NeuroQuant wo and w contrast; Future         Subjective:    HPI     Patient is a very pleasant 58-year-old female with complex medical history including methamphetamine abuse, recent PE on warfarin, depression and anxiety, mood disorder, schizoaffective disorder, hypothyroidism, MAG on BiPAP and Sjogren's syndrome who presents for evaluation of tremors. Patient's tremor started when she started one of her psych medications around 2 to 3 years ago. She does not recall which medication specifically.   The tremor persisted but definitely got worse over the last year. She reports the tremor involves both arms and both legs, arms worse than legs. She denies head tremor. Anxiety makes tremor worse. Trying to relax and calm down may make it better but nothing completely suppresses them. Patient abused meth for 5 to 7 years approximately. She currently has been 20 days off and her first attempt at detoxing. Her tremors were a little worse immediately prior stopping meth use however they have gotten significantly worse after stopping meth. She reports being on Depakote for a few years. Reports Xanax likely helps her tremor which she takes at night to help fall asleep. The following portions of the patient's history were reviewed and updated as appropriate: allergies, current medications, past family history, past medical history, past social history, past surgical history and problem list and ros. Objective:    Blood pressure 132/82, pulse 77, temperature 97.7 °F (36.5 °C), temperature source Temporal, resp. rate 20, height 5' 6" (1.676 m), weight (!) 139 kg (305 lb 6.4 oz), SpO2 96 %, not currently breastfeeding. Physical Exam  Eyes:      General: Lids are normal.      Extraocular Movements: Extraocular movements intact. Neurological:      Motor: Motor strength is normal.     Deep Tendon Reflexes:      Reflex Scores:       Bicep reflexes are 1+ on the right side and 1+ on the left side. Brachioradialis reflexes are 1+ on the right side and 1+ on the left side. Patellar reflexes are 0 on the right side. Psychiatric:         Speech: Speech normal.         Neurological Exam  Mental Status  Awake, alert and oriented to person, place and time. Speech is normal. Language is fluent with no aphasia. Attention and concentration are normal.    Cranial Nerves  CN II: Visual fields full to confrontation. CN III, IV, VI: Extraocular movements intact bilaterally.  Normal lids and orbits bilaterally. CN V: Facial sensation is normal.  CN VII: Full and symmetric facial movement. CN VIII: Hearing is normal.  CN IX, X: Palate elevates symmetrically  CN XI: Shoulder shrug strength is normal.  CN XII: Tongue midline without atrophy or fasciculations. Motor  Increased muscle bulk throughout. Normal muscle tone. The following abnormal movements were seen: Strength is 5/5 throughout all four extremities. Mod amplitude and mod-high freq tremor in upper and lower extremities, slight oscillating component to it   Tremor dampens with distraction and with action   . Sensory  Light touch is normal in upper and lower extremities. Reflexes                                            Right                      Left  Brachioradialis                    1+                         1+  Biceps                                 1+                         1+  Patellar                               0                            Coordination  Right: Rapid alternating movement normal.Left: Rapid alternating movement normal.  Tremor dampens on finger to nose, but slightly more prominent on L more than R .    Gait    Tremor dampens with walking . ROS:    Review of Systems      Review of Systems   Constitutional: Negative for appetite change, fatigue and fever. HENT: Negative. Negative for hearing loss, tinnitus, trouble swallowing and voice change. Eyes: Negative. Negative for photophobia, pain and visual disturbance. Respiratory: Negative. Negative for shortness of breath. Cardiovascular: Negative. Negative for palpitations. Gastrointestinal: Negative. Negative for nausea and vomiting. Endocrine: Negative. Negative for cold intolerance. Genitourinary: Negative. Negative for dysuria, frequency and urgency. Musculoskeletal: Negative for back pain, gait problem, myalgias and neck pain. Skin: Negative. Negative for rash. Allergic/Immunologic: Negative.     Neurological: Positive for tremors (hands and legs - has worsened ) and headaches (12/1 MRI of the neck to rule out any Fx, HA haven't been too bad recently ). Negative for dizziness, seizures, syncope, facial asymmetry, speech difficulty, weakness, light-headedness and numbness. Hematological: Negative. Does not bruise/bleed easily. Psychiatric/Behavioral: Negative. Negative for confusion, hallucinations and sleep disturbance. All other systems reviewed and are negative.

## 2023-09-22 NOTE — PATIENT INSTRUCTIONS
Workup:    MRI brain neuroquant     Management:  Discuss with psych discontinuing depakote   Physical therapy   Keep a diary of things that make your tremor better or worse, especially medications.

## 2023-09-23 DIAGNOSIS — N95.1 VASOMOTOR SYMPTOMS DUE TO MENOPAUSE: ICD-10-CM

## 2023-09-23 NOTE — ED ATTENDING ATTESTATION
9/20/2023  IMere DO, saw and evaluated the patient. I have discussed the patient with the resident/non-physician practitioner and agree with the resident's/non-physician practitioner's findings, Plan of Care, and MDM as documented in the resident's/non-physician practitioner's note, except where noted. All available labs and Radiology studies were reviewed. I was present for key portions of any procedure(s) performed by the resident/non-physician practitioner and I was immediately available to provide assistance. At this point I agree with the current assessment done in the Emergency Department. I have conducted an independent evaluation of this patient a history and physical is as follows:  58-year-old female presents the emergency department for evaluation of shortness of breath and chest pain. Patient had an admission earlier this month when she was diagnosed with a saddle pulmonary embolism with right ventricular strain. She was initially started on a DOAC, there was concerns that this was not effective and she was switched to warfarin which she has been compliant with. Patient states that she was feeling well for several days however today she began to feel some mild nonradiating substernal chest pain with shortness of breath. She does endorse that this is not as severe as it was when she was initially diagnosed with pulmonary embolism. Patient denies nausea, vomiting, diarrhea. No fevers or chills. No syncope or palpitations. Patient states that she has been clean from methamphetamine for the past 19 days. Past medical history: Hypertension, depression, pulmonary embolism, methamphetamine abuse, schizoaffective disorder, bipolar type, COPD, Sjogren's syndrome    Physical exam: Patient is anxious with tremulous upper extremities. Mucous membranes are moist.  Neck is supple. Heart is regular rate and rhythm without ectopy.   Lungs are clear to auscultation with few scattered wheezes noted.  Abdomen is soft, obese, nontender nondistended with normoactive bowel sounds. 1+ pitting edema bilateral lower extremities. Mild tenderness with palpation of the left calf. Negative Homans. No focal motor or sensory deficits. Assessment: 58-year-old female with recent pulmonary embolism presents with chest pain and shortness of breath. Differential diagnosis includes but is not limited to recurrence or progression of pulmonary embolism, medication noncompliance, acute coronary syndrome, pneumonia, cardiac dysrhythmia, anemia, COPD exacerbation    Plan: We will check CTA of the chest to evaluate clot burden, check EKG and electrolytes, provide oxygen as needed, check troponin reassess  ED Course  ED Course as of 09/23/23 0744   Wed Sep 20, 2023   2145 Patient reevaluated by me. She is resting comfortably. She appears less anxious than previous. I reviewed her CT scan with her. She has no PE at this time. She is adequately anticoagulated. CT does mention that patient might have trace infiltrates. Due to patient's recurrent episodes of shortness of breath will treat with a course of antibiotics. First dose of oral antibiotic will be given here. Patient can be discharged. 2211 CT interpreted by radiology V rad is negative for PE, trace bibasilar atelectasis and/or infiltrate similar to prior correlate clinically for possible pneumonia.          Critical Care Time  Procedures

## 2023-09-25 LAB
ATRIAL RATE: 70 BPM
P AXIS: 26 DEGREES
PR INTERVAL: 160 MS
QRS AXIS: 9 DEGREES
QRSD INTERVAL: 80 MS
QT INTERVAL: 382 MS
QTC INTERVAL: 412 MS
T WAVE AXIS: 23 DEGREES
VENTRICULAR RATE: 70 BPM

## 2023-09-25 PROCEDURE — 93010 ELECTROCARDIOGRAM REPORT: CPT | Performed by: INTERNAL MEDICINE

## 2023-09-26 ENCOUNTER — HOSPITAL ENCOUNTER (INPATIENT)
Facility: HOSPITAL | Age: 53
LOS: 1 days | End: 2023-09-28
Attending: EMERGENCY MEDICINE | Admitting: INTERNAL MEDICINE
Payer: MEDICARE

## 2023-09-26 ENCOUNTER — APPOINTMENT (EMERGENCY)
Dept: RADIOLOGY | Facility: HOSPITAL | Age: 53
End: 2023-09-26
Payer: MEDICARE

## 2023-09-26 DIAGNOSIS — T50.902A INTENTIONAL OVERDOSE OF DRUG IN TABLET FORM (HCC): Primary | ICD-10-CM

## 2023-09-26 DIAGNOSIS — R79.1 SUPRATHERAPEUTIC INR: ICD-10-CM

## 2023-09-26 DIAGNOSIS — R45.851 SUICIDAL IDEATION: ICD-10-CM

## 2023-09-26 DIAGNOSIS — R79.89 ELEVATED TSH: ICD-10-CM

## 2023-09-26 DIAGNOSIS — R45.851 SUICIDAL INTENT: ICD-10-CM

## 2023-09-26 DIAGNOSIS — Z00.8 MEDICAL CLEARANCE FOR PSYCHIATRIC ADMISSION: ICD-10-CM

## 2023-09-26 LAB
AMPHETAMINES SERPL QL SCN: NEGATIVE
BARBITURATES UR QL: NEGATIVE
BASOPHILS # BLD AUTO: 0.04 THOUSANDS/ÂΜL (ref 0–0.1)
BASOPHILS NFR BLD AUTO: 0 % (ref 0–1)
BENZODIAZ UR QL: NEGATIVE
COCAINE UR QL: NEGATIVE
EOSINOPHIL # BLD AUTO: 0.13 THOUSAND/ÂΜL (ref 0–0.61)
EOSINOPHIL NFR BLD AUTO: 1 % (ref 0–6)
ERYTHROCYTE [DISTWIDTH] IN BLOOD BY AUTOMATED COUNT: 14.1 % (ref 11.6–15.1)
ETHANOL EXG-MCNC: 0 MG/DL
HCT VFR BLD AUTO: 42.1 % (ref 34.8–46.1)
HGB BLD-MCNC: 13.1 G/DL (ref 11.5–15.4)
IMM GRANULOCYTES # BLD AUTO: 0.17 THOUSAND/UL (ref 0–0.2)
IMM GRANULOCYTES NFR BLD AUTO: 2 % (ref 0–2)
LYMPHOCYTES # BLD AUTO: 2.79 THOUSANDS/ÂΜL (ref 0.6–4.47)
LYMPHOCYTES NFR BLD AUTO: 28 % (ref 14–44)
MCH RBC QN AUTO: 29.8 PG (ref 26.8–34.3)
MCHC RBC AUTO-ENTMCNC: 31.1 G/DL (ref 31.4–37.4)
MCV RBC AUTO: 96 FL (ref 82–98)
METHADONE UR QL: NEGATIVE
MONOCYTES # BLD AUTO: 1.03 THOUSAND/ÂΜL (ref 0.17–1.22)
MONOCYTES NFR BLD AUTO: 10 % (ref 4–12)
NEUTROPHILS # BLD AUTO: 5.88 THOUSANDS/ÂΜL (ref 1.85–7.62)
NEUTS SEG NFR BLD AUTO: 59 % (ref 43–75)
NRBC BLD AUTO-RTO: 0 /100 WBCS
OPIATES UR QL SCN: NEGATIVE
OXYCODONE+OXYMORPHONE UR QL SCN: NEGATIVE
PCP UR QL: NEGATIVE
PLATELET # BLD AUTO: 159 THOUSANDS/UL (ref 149–390)
PMV BLD AUTO: 10.2 FL (ref 8.9–12.7)
RBC # BLD AUTO: 4.39 MILLION/UL (ref 3.81–5.12)
THC UR QL: POSITIVE
WBC # BLD AUTO: 10.04 THOUSAND/UL (ref 4.31–10.16)

## 2023-09-26 PROCEDURE — 82077 ASSAY SPEC XCP UR&BREATH IA: CPT

## 2023-09-26 PROCEDURE — 80307 DRUG TEST PRSMV CHEM ANLYZR: CPT | Performed by: EMERGENCY MEDICINE

## 2023-09-26 PROCEDURE — 96374 THER/PROPH/DIAG INJ IV PUSH: CPT

## 2023-09-26 PROCEDURE — 80053 COMPREHEN METABOLIC PANEL: CPT

## 2023-09-26 PROCEDURE — 82075 ASSAY OF BREATH ETHANOL: CPT | Performed by: EMERGENCY MEDICINE

## 2023-09-26 PROCEDURE — 80179 DRUG ASSAY SALICYLATE: CPT

## 2023-09-26 PROCEDURE — 82550 ASSAY OF CK (CPK): CPT

## 2023-09-26 PROCEDURE — 85025 COMPLETE CBC W/AUTO DIFF WBC: CPT

## 2023-09-26 PROCEDURE — 85730 THROMBOPLASTIN TIME PARTIAL: CPT | Performed by: EMERGENCY MEDICINE

## 2023-09-26 PROCEDURE — 84439 ASSAY OF FREE THYROXINE: CPT

## 2023-09-26 PROCEDURE — 85610 PROTHROMBIN TIME: CPT | Performed by: EMERGENCY MEDICINE

## 2023-09-26 PROCEDURE — 36415 COLL VENOUS BLD VENIPUNCTURE: CPT

## 2023-09-26 PROCEDURE — 71045 X-RAY EXAM CHEST 1 VIEW: CPT

## 2023-09-26 PROCEDURE — 99285 EMERGENCY DEPT VISIT HI MDM: CPT

## 2023-09-26 PROCEDURE — 93005 ELECTROCARDIOGRAM TRACING: CPT

## 2023-09-26 PROCEDURE — 83690 ASSAY OF LIPASE: CPT

## 2023-09-26 PROCEDURE — 99285 EMERGENCY DEPT VISIT HI MDM: CPT | Performed by: EMERGENCY MEDICINE

## 2023-09-26 PROCEDURE — 84443 ASSAY THYROID STIM HORMONE: CPT

## 2023-09-26 PROCEDURE — 80143 DRUG ASSAY ACETAMINOPHEN: CPT

## 2023-09-26 PROCEDURE — 96361 HYDRATE IV INFUSION ADD-ON: CPT

## 2023-09-26 RX ORDER — ONDANSETRON 2 MG/ML
4 INJECTION INTRAMUSCULAR; INTRAVENOUS ONCE
Status: COMPLETED | OUTPATIENT
Start: 2023-09-26 | End: 2023-09-26

## 2023-09-26 RX ADMIN — SODIUM CHLORIDE 1000 ML: 0.9 INJECTION, SOLUTION INTRAVENOUS at 23:40

## 2023-09-26 RX ADMIN — ONDANSETRON 4 MG: 2 INJECTION INTRAMUSCULAR; INTRAVENOUS at 23:48

## 2023-09-27 PROBLEM — Z86.711 HISTORY OF PULMONARY EMBOLUS (PE): Status: ACTIVE | Noted: 2023-09-02

## 2023-09-27 PROBLEM — T50.902A INTENTIONAL OVERDOSE (HCC): Status: ACTIVE | Noted: 2023-09-27

## 2023-09-27 LAB
ALBUMIN SERPL BCP-MCNC: 3.5 G/DL (ref 3.5–5)
ALP SERPL-CCNC: 45 U/L (ref 34–104)
ALT SERPL W P-5'-P-CCNC: 15 U/L (ref 7–52)
ANION GAP SERPL CALCULATED.3IONS-SCNC: 9 MMOL/L
APAP SERPL-MCNC: <10 UG/ML (ref 10–20)
APTT PPP: 61 SECONDS (ref 23–37)
AST SERPL W P-5'-P-CCNC: 14 U/L (ref 13–39)
ATRIAL RATE: 80 BPM
BILIRUB SERPL-MCNC: 0.19 MG/DL (ref 0.2–1)
BUN SERPL-MCNC: 15 MG/DL (ref 5–25)
CALCIUM SERPL-MCNC: 8.4 MG/DL (ref 8.4–10.2)
CHLORIDE SERPL-SCNC: 109 MMOL/L (ref 96–108)
CK SERPL-CCNC: 52 U/L (ref 26–192)
CO2 SERPL-SCNC: 21 MMOL/L (ref 21–32)
CREAT SERPL-MCNC: 0.64 MG/DL (ref 0.6–1.3)
ETHANOL SERPL-MCNC: <10 MG/DL
GFR SERPL CREATININE-BSD FRML MDRD: 103 ML/MIN/1.73SQ M
GLUCOSE SERPL-MCNC: 102 MG/DL (ref 65–140)
INR PPP: 4.34 (ref 0.84–1.19)
INR PPP: 4.36 (ref 0.84–1.19)
LIPASE SERPL-CCNC: 27 U/L (ref 11–82)
P AXIS: 47 DEGREES
POTASSIUM SERPL-SCNC: 3.9 MMOL/L (ref 3.5–5.3)
PR INTERVAL: 174 MS
PROT SERPL-MCNC: 5.9 G/DL (ref 6.4–8.4)
PROTHROMBIN TIME: 42.7 SECONDS (ref 11.6–14.5)
PROTHROMBIN TIME: 42.9 SECONDS (ref 11.6–14.5)
QRS AXIS: 14 DEGREES
QRSD INTERVAL: 86 MS
QT INTERVAL: 358 MS
QTC INTERVAL: 412 MS
SALICYLATES SERPL-MCNC: <5 MG/DL (ref 3–20)
SODIUM SERPL-SCNC: 139 MMOL/L (ref 135–147)
T WAVE AXIS: 56 DEGREES
T4 FREE SERPL-MCNC: 0.8 NG/DL (ref 0.61–1.12)
TSH SERPL DL<=0.05 MIU/L-ACNC: 14.26 UIU/ML (ref 0.45–4.5)
VENTRICULAR RATE: 80 BPM

## 2023-09-27 PROCEDURE — 96361 HYDRATE IV INFUSION ADD-ON: CPT

## 2023-09-27 PROCEDURE — 93010 ELECTROCARDIOGRAM REPORT: CPT | Performed by: INTERNAL MEDICINE

## 2023-09-27 PROCEDURE — 99223 1ST HOSP IP/OBS HIGH 75: CPT | Performed by: INTERNAL MEDICINE

## 2023-09-27 PROCEDURE — 96375 TX/PRO/DX INJ NEW DRUG ADDON: CPT

## 2023-09-27 PROCEDURE — 85610 PROTHROMBIN TIME: CPT | Performed by: INTERNAL MEDICINE

## 2023-09-27 RX ORDER — WATER 1000 ML/1000ML
INJECTION, SOLUTION INTRAVENOUS
Status: COMPLETED
Start: 2023-09-27 | End: 2023-09-27

## 2023-09-27 RX ORDER — TOPIRAMATE 100 MG/1
200 TABLET, FILM COATED ORAL 2 TIMES DAILY
Status: DISCONTINUED | OUTPATIENT
Start: 2023-09-27 | End: 2023-09-28 | Stop reason: HOSPADM

## 2023-09-27 RX ORDER — ZIPRASIDONE MESYLATE 20 MG/ML
20 INJECTION, POWDER, LYOPHILIZED, FOR SOLUTION INTRAMUSCULAR EVERY 4 HOURS PRN
Status: DISCONTINUED | OUTPATIENT
Start: 2023-09-27 | End: 2023-09-28 | Stop reason: HOSPADM

## 2023-09-27 RX ORDER — ESTRADIOL 1 MG/1
0.5 TABLET ORAL DAILY
Status: DISCONTINUED | OUTPATIENT
Start: 2023-09-27 | End: 2023-09-28 | Stop reason: HOSPADM

## 2023-09-27 RX ORDER — LEVOTHYROXINE SODIUM 0.12 MG/1
125 TABLET ORAL
Status: DISCONTINUED | OUTPATIENT
Start: 2023-09-27 | End: 2023-09-28 | Stop reason: HOSPADM

## 2023-09-27 RX ORDER — BUPROPION HYDROCHLORIDE 150 MG/1
300 TABLET ORAL DAILY
Status: DISCONTINUED | OUTPATIENT
Start: 2023-09-27 | End: 2023-09-28 | Stop reason: HOSPADM

## 2023-09-27 RX ORDER — AMLODIPINE BESYLATE 5 MG/1
5 TABLET ORAL DAILY
Status: DISCONTINUED | OUTPATIENT
Start: 2023-09-27 | End: 2023-09-28 | Stop reason: HOSPADM

## 2023-09-27 RX ORDER — OXYBUTYNIN CHLORIDE 5 MG/1
5 TABLET ORAL 2 TIMES DAILY
Status: DISCONTINUED | OUTPATIENT
Start: 2023-09-27 | End: 2023-09-28 | Stop reason: HOSPADM

## 2023-09-27 RX ORDER — LORAZEPAM 2 MG/ML
1 INJECTION INTRAMUSCULAR ONCE
Status: COMPLETED | OUTPATIENT
Start: 2023-09-27 | End: 2023-09-27

## 2023-09-27 RX ORDER — ROPINIROLE 1 MG/1
1 TABLET, FILM COATED ORAL ONCE
Status: COMPLETED | OUTPATIENT
Start: 2023-09-27 | End: 2023-09-27

## 2023-09-27 RX ORDER — HALOPERIDOL DECANOATE 50 MG/ML
50 INJECTION INTRAMUSCULAR ONCE
Status: COMPLETED | OUTPATIENT
Start: 2023-09-27 | End: 2023-09-27

## 2023-09-27 RX ORDER — ONDANSETRON 4 MG/1
4 TABLET, ORALLY DISINTEGRATING ORAL EVERY 6 HOURS PRN
Status: DISCONTINUED | OUTPATIENT
Start: 2023-09-27 | End: 2023-09-28 | Stop reason: HOSPADM

## 2023-09-27 RX ORDER — ALBUTEROL SULFATE 90 UG/1
2 AEROSOL, METERED RESPIRATORY (INHALATION) EVERY 6 HOURS PRN
Status: DISCONTINUED | OUTPATIENT
Start: 2023-09-27 | End: 2023-09-28 | Stop reason: HOSPADM

## 2023-09-27 RX ORDER — PANTOPRAZOLE SODIUM 20 MG/1
20 TABLET, DELAYED RELEASE ORAL
Status: DISCONTINUED | OUTPATIENT
Start: 2023-09-27 | End: 2023-09-28 | Stop reason: HOSPADM

## 2023-09-27 RX ORDER — FUROSEMIDE 20 MG/1
20 TABLET ORAL DAILY
Status: DISCONTINUED | OUTPATIENT
Start: 2023-09-27 | End: 2023-09-28 | Stop reason: HOSPADM

## 2023-09-27 RX ORDER — OLANZAPINE 10 MG/1
INJECTION, POWDER, LYOPHILIZED, FOR SOLUTION INTRAMUSCULAR
Status: COMPLETED
Start: 2023-09-27 | End: 2023-09-27

## 2023-09-27 RX ADMIN — ESTRADIOL 0.5 MG: 1 TABLET ORAL at 09:30

## 2023-09-27 RX ADMIN — FUROSEMIDE 20 MG: 20 TABLET ORAL at 09:31

## 2023-09-27 RX ADMIN — TOPIRAMATE 200 MG: 100 TABLET, FILM COATED ORAL at 09:31

## 2023-09-27 RX ADMIN — FLUTICASONE FUROATE 1 PUFF: 100 POWDER RESPIRATORY (INHALATION) at 09:29

## 2023-09-27 RX ADMIN — PANTOPRAZOLE SODIUM 20 MG: 20 TABLET, DELAYED RELEASE ORAL at 17:18

## 2023-09-27 RX ADMIN — OXYBUTYNIN CHLORIDE 5 MG: 5 TABLET ORAL at 09:31

## 2023-09-27 RX ADMIN — OXYBUTYNIN CHLORIDE 5 MG: 5 TABLET ORAL at 17:18

## 2023-09-27 RX ADMIN — LORAZEPAM 1 MG: 2 INJECTION INTRAMUSCULAR; INTRAVENOUS at 02:47

## 2023-09-27 RX ADMIN — WATER 10 ML: 1 INJECTION INTRAMUSCULAR; INTRAVENOUS; SUBCUTANEOUS at 01:03

## 2023-09-27 RX ADMIN — HALOPERIDOL DECANOATE 50 MG: 50 INJECTION INTRAMUSCULAR at 17:18

## 2023-09-27 RX ADMIN — OLANZAPINE 10 MG: 10 INJECTION, POWDER, FOR SOLUTION INTRAMUSCULAR at 01:02

## 2023-09-27 RX ADMIN — BUPROPION HYDROCHLORIDE 300 MG: 150 TABLET, FILM COATED, EXTENDED RELEASE ORAL at 09:30

## 2023-09-27 RX ADMIN — TOPIRAMATE 200 MG: 100 TABLET, FILM COATED ORAL at 17:18

## 2023-09-27 RX ADMIN — ROPINIROLE HYDROCHLORIDE 1 MG: 1 TABLET, FILM COATED ORAL at 02:12

## 2023-09-27 NOTE — H&P
[address] [facility]  H&P  Name: Guy Falcon 47 y.o. female I MRN: [id number]  Unit/Bed#: S -01 I Date of Admission: 9/26/2023   Date of Service: 9/27/2023 I Hospital Day: 0      Assessment/Plan   * Intentional overdose [facility])  Assessment & Plan  · Past medical history of prior suicide attempts. Presented with suicide attempt by taking 25 pills of Flexeril 10 mg pills. Per ED note, patient called EMS herself and wanted to voluntarily commit herself to inpatient psych facility. In the ED patient received lorazepam and olanzapine, by the time I went to see her she was in deep sleep. · CMP, CBC unremarkable  · INR supratherapeutic at 4.34, tox screen positive for THC. · EKG normal sinus rhythm 80  · Chest x-ray concerning for congestion or atelectasis on left side. Plan:  · Per poison control, monitor  for 6 to 8 hours. · Follow-up on formal read of CXR. · Consult psych, appreciate recommendations      History of pulmonary embolus (PE)  Assessment & Plan  · INR supratherapeutic at 4.34.  · She was on DOAC but there was some concerns about drug to drug interactions with her psych meds thus she was switched to warfarin. Plan:  · Hold warfarin for now. Hypothyroidism  Assessment & Plan  · TSH elevated at 14.259, free T4 = .80 indicating subclinical hypothyroidism. · Home dose levothyroxine 125 mcg. Plan:  · Resume levothyroxine 125 mcg. Major depressive disorder  Assessment & Plan  · Per ED note, patient reports feeling depressed and anxious regarding her medical problems and increased stress about her new job. She sees a therapist 3 times a week and psychiatrist as well. Plan:  · Consult psychology, appreciate recommendations/med review. VTE Pharmacologic Prophylaxis:   Supratherapeutic INR on warfarin. Code Status: Level 1 - Full Code    Discussion with family: Patient was in deep sleep, by the time I saw her. Please ask her again in the morning. Yvette Liao Anticipated Length of Stay: Patient will be admitted on an observation basis with an anticipated length of stay of less than 2 midnights secondary to Intentional overdose. .    Chief Complaint: Intentional overdose. History of Present Illness:  Nory Crenshaw is a 47 y.o. female with a PMH of prior suicide attempts via cutting wrist,methamphetamine abuse, depression, anxiety, schizoaffective disorder, hypothyroidism, MAG on BiPAP, COPD, prior PE on warfarin who presents with suicide attempt by taking 25 of 10 mg flexeril pills. Per ED note, positive for suicidal ideation with plan. No homicidal ideation or plan. Patient wanted to voluntarily commit herself to inpatient psych facility. She also endorsed to nausea, weakness and shortness of breath. She also reported feeling depressed and anxious surrounding her medical problems and increased stress about her new job. She lives alone in [facility] and sees a therapist 3 times a week as a psychiatrist. Michial Latin to recreational marijuana use. GCS 15 at the ED.     Review of Systems:  Review of Systems   Unable to perform ROS: Other       Past Medical and Surgical History:   Past Medical History:   Diagnosis Date   • Anxiety    • Arthritis     oa cassandra knees   • Asthma     good control- no medications   • Yan's esophagus    • Bipolar affective disorder ([address])    • Cannabis abuse with unspecified 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[address])    • Chronic pain disorder     lumbar   • COPD (chronic obstructive pulmonary disease) (HCC)    • CPAP (continuous positive airway pressure) dependence    • Degenerative disc disease at L5-S1 level    • Deliberate self-cutting     9/15/22per pt has not done recently-- does see a therapist and psychiatrist regularly   • Depression 09/16/2008   • Disease of thyroid gland     hypo   • MARTINEZ (dyspnea on exertion)     per pt "has had this with exertion and not new"   • Drug overdose 10/28/2008    suicide attempt and again drug overdose 7/2022--[facility] floor and than transferred to [facility]   • Dysphagia    • Dyspnea    • Edema     BLE   • Family history of blood clots    • GERD (gastroesophageal reflux disease)    • Headache(784.0)    • Headache, tension-type    • High blood pressure    • History of COVID-19 12/30/2020    Symptoms started on 12/30/2020 and then got worse. Today she is feeling a little bit better. She is a candidate for monoclonal antibody infusion and set for 1/6/21 @ 1pm at [facility]. 01/07/21 - telemedicine -    • Hyperlipidemia    • Hypertension    • Knee pain, bilateral     Especially right   • Medical marijuana use    • Memory loss    • Migraines    • Obesity    • Overactive bladder    • Sjogren's disease ([address])    • Sleep apnea    • Stress incontinence    • Suicidal ideations    • Teeth missing    • Tremor     per pt Tremors of Right Leg and both Arms   • Visual impairment    • Wears glasses        Past Surgical History:   Procedure Laterality Date   • BREAST CYST EXCISION Right 1989   • CARPAL TUNNEL RELEASE Left    • CHOLECYSTECTOMY  05/2003    Laparoscopic   • COLONOSCOPY      01/12/2009   • DILATION AND CURETTAGE OF UTERUS     • ELBOW SURGERY Left     x2.  No hardware   • ESOPHAGOGASTRODUODENOSCOPY     • FOOT SURGERY Left     Plantar fasciotomy   • HERNIA REPAIR     • HYSTERECTOMY      laporoscopic, partial   • KNEE ARTHROSCOPY Bilateral    • OOPHORECTOMY Left 10/2015   • TX GASTROCNEMIUS RECESSION Left 02/24/2021    Procedure: RECESSION GASTROC OPEN;  Surgeon: Devora Hoffman DPM;  Location: [address] [place] [address] OR;  Service: Podiatry   • TX RPR UMBILICAL HRNA 5 YRS/> REDUCIBLE N/A 04/24/2019    Procedure: REPAIR HERNIA UMBILICAL LAPAROSCOPIC W/ ROBOTICS;  Surgeon: Mg Lerner MD;  Location: [facility] Main OR;  Service: General   • TX SPHINCTEROTOMY ANAL DIVISION SPHINCTER SPX N/A 08/31/2018    Procedure: EUA, LEFT PARTIAL INTERNAL SPHINCTEROTOMY;  Surgeon: Shahrzad Rodriguez MD;  Location: [address] [place] [address] MAIN OR;  Service: Colorectal   • TX TNOT ELBOW LATERAL/MEDIAL DEBRIDE OPEN TDN RPR Left 09/20/2022    Procedure: RELEASE EPICONDYLAR ELBOW MEDIAL;  Surgeon: Liyah Penny MD;  Location: [facility] MAIN OR;  Service: Orthopedics   • REDUCTION MAMMAPLASTY Bilateral 1999   • REPAIR LACERATION Left     left hand-5/18/2009   • REPLACEMENT TOTAL KNEE Right    • ROTATOR CUFF REPAIR Left    • TONSILECTOMY AND ADNOIDECTOMY     • US GUIDED MSK PROCEDURE  04/22/2021   • VASCULAR SURGERY     • VEIN LIGATION Bilateral    • WISDOM TOOTH EXTRACTION         Meds/Allergies:  Prior to Admission medications    Medication Sig Start Date End Date Taking?  Authorizing Provider   amLODIPine (NORVASC) 5 mg tablet TAKE 1 TABLET (5 MG TOTAL) BY MOUTH DAILY 7/12/23  Yes JHONATAN Uribe   buPROPion (WELLBUTRIN XL) 300 mg 24 hr tablet Take 300 mg by mouth daily 9/16/22  Yes Historical Provider, MD   estradiol (ESTRACE) 0.5 MG tablet Take 1 tablet (0.5 mg total) by mouth daily 5/4/23  Yes Bernice Vasquez DO   furosemide (LASIX) 20 mg tablet Take 1 tablet (20 mg total) by mouth daily 9/6/23  Yes JHONATAN Uribe   levothyroxine (Euthyrox) 125 mcg tablet Take 1 tablet (125 mcg total) by mouth daily in the early morning 3/16/23  Yes JHONATAN Montelongo   losartan (COZAAR) 50 mg tablet TAKE ONE TABLET BY MOUTH DAILY 3/29/23  Yes JHONATAN Montelongo   metoprolol tartrate (LOPRESSOR) 25 mg tablet TAKE ONE TABLET BY MOUTH EVERY 12 HOURS 9/13/23  Yes JHONATAN Uribe   RABEprazole (ACIPHEX) 20 MG tablet Take 20 mg by mouth 2 (two) times a day   Yes Historical Provider, MD   rOPINIRole (REQUIP) 1 mg tablet  9/26/22  Yes Historical Provider, MD   topiramate (TOPAMAX) 100 mg tablet Take 1.5 tablets (150 mg total) by mouth 2 (two) times a day  Patient taking differently: Take 200 mg by mouth 2 (two) times a day 1/26/23 9/27/23 Yes JHONATAN Uribe   trospium chloride (SANCTURA) 20 mg tablet Take 20 mg by mouth 2 (two) times a day 1/12/23 1/12/24 Yes Historical Provider, MD albuterol (Ventolin HFA) 90 mcg/act inhaler Inhale 2 puffs every 6 (six) hours as needed for wheezing 9/15/23   Guera Mehta DO   ammonium lactate (LAC-HYDRIN) 12 % cream  8/2/23   Historical Provider, MD   buPROPion (WELLBUTRIN XL) 150 mg 24 hr tablet Take 1 tablet (150 mg total) by mouth daily  Patient not taking: Reported on 9/22/2023 7/26/22 9/15/23  Clarita Castillo DO   carBAMazepine (CARBATROL) 300 MG 12 hr capsule Take 1 capsule by mouth in the morning 11/23/22   Historical Provider, MD   Cholecalciferol (Vitamin D3) 125 MCG (5000 UT) CAPS Take by mouth in the morning 5/25/22   Historical Provider, MD   clobetasol (TEMOVATE) 0.05 % ointment Apply to affected areas sparingly. Do not exceed 2 weeks of treatment. 9/6/23   JHONATAN Griffiths   colestipol (COLESTID) 1 g tablet Take 1 tablet (1 g total) by mouth 2 (two) times a day 7/16/22 9/22/23  Wong Reynolds MD   cyproheptadine (PERIACTIN) 4 mg tablet     Historical Provider, MD   Diclofenac Sodium (VOLTAREN) 1 % Apply 2 g topically 3 (three) times a day as needed (pain) For pain to affected area 5/5/23   Zoila Urbina PA-C   Diclofenac Sodium (VOLTAREN) 1 % Apply 2 g topically 4 (four) times a day 7/17/23   JHONATAN Griffiths   divalproex sodium (DEPAKOTE) 250 mg EC tablet Take 3 tablets (750 mg total) by mouth every 12 (twelve) hours 7/25/22 9/22/23  Clarita Castillo DO   ergocalciferol (ERGOCALCIFEROL) 1.25 MG (24238 UT) capsule     Historical Provider, MD   Fesoterodine Fumarate ER (Toviaz) 4 MG TB24 Take 1 tablet by mouth daily    Historical Provider, MD   fluticasone (FLOVENT HFA) 110 MCG/ACT inhaler Inhale 2 puffs as needed Rinse mouth after use.     Historical Provider, MD   haloperidol decanoate (Haldol Decanoate) 50 mg/mL injection every 30 (thirty) days 8/3/22   Historical Provider, MD   hydrocortisone 2.5 % ointment Apply topically 2 (two) times a day 10/25/22   JHONATAN Fair   hydrOXYzine HCL (ATARAX) 50 mg tablet Take 1 tablet (50 mg total) by mouth daily at bedtime 9/15/23   Alisa Johnson DO   Lurasidone HCl 60 MG TABS Take 1 tablet (60 mg total) by mouth 2 (two) times a day TAKEN IN THE MORNING AND AT NIGHT-----[place]uda 23  JHONATAN Etienne   memantine [facility]) 10 mg tablet     Historical Provider, MD   ondansetron (ZOFRAN-ODT) 4 mg disintegrating tablet Take 1 tablet (4 mg total) by mouth every 6 (six) hours as needed for nausea or vomiting 23   JHONATAN Etienne   pilocarpine (SALAGEN) 5 mg tablet Take 5 mg by mouth 2 (two) times a day    Historical Provider, MD   venlafaxine [facility]) 37.5 mg tablet Take 37.5 mg by mouth daily 10/19/22   Historical Provider, MD   warfarin (COUMADIN) 6 mg tablet Take 6mg (one tablet) daily every day by mouth. 23   Amandeep Matias DO     I have reviewed home medications using recent Epic encounter. Allergies: Allergies   Allergen Reactions   • Percocet [Oxycodone-Acetaminophen] Headache     Severe headaches   (denies issues with Tylenol)   • Povidone Iodine Rash     Unsure if betadine or gauze post surgical. Got rash on leg. Has  Had itchy rashes after every surgery prep and IV insertion   • Medical Tape Hives   • Chlorhexidine Rash     Per pt "able to use the liquid soap--thinkd reaction from the sponges or wipes"       Social History:  Marital Status: Single   Occupation:   Patient Pre-hospital Living Situation: Home  Patient Pre-hospital Level of Mobility: walks  Patient Pre-hospital Diet Restrictions:   Substance Use History:   Social History     Substance and Sexual Activity   Alcohol Use Not Currently    Comment: Recovering alcoholic     Social History     Tobacco Use   Smoking Status Former   • Packs/day: 2.00   • Years: 30.00   • Total pack years: 60.00   • Types: Cigarettes   • Start date: 1985   • Quit date: 2018   • Years since quittin.7   Smokeless Tobacco Never   Tobacco Comments    Started at age 14.      Social History     Substance and Sexual Activity   Drug Use Yes   • Types: Marijuana, Methamphetamines    Comment: Trying to get off meth       Family History:  Family History   Problem Relation Age of Onset   • Kidney cancer Mother    • Diabetes Mother    • Depression Mother    • Stroke Mother    • Arthritis Mother    • Cancer Mother    • Psychiatric Illness Mother    • No Known Problems Father    • No Known Problems Sister    • No Known Problems Maternal Grandmother    • No Known Problems Maternal Grandfather    • No Known Problems Paternal Grandmother    • No Known Problems Paternal Grandfather    • Colon cancer Maternal Uncle    • Colon cancer Maternal Uncle    • Colon cancer Paternal Uncle    • Colon cancer Family    • Alcohol abuse Sister    • Asthma Sister        Physical Exam:     Vitals:   Blood Pressure: 135/71 (09/27/23 0330)  Pulse: 78 (09/27/23 0330)  Temperature: 97.7 °F (36.5 °C) (09/27/23 0330)  Temp Source: Oral (09/26/23 2236)  Respirations: 20 (09/27/23 0300)  SpO2: (!) 89 % (09/27/23 0330)    Physical Exam  Constitutional:       Appearance: Normal appearance. HENT:      Head: Normocephalic and atraumatic. Mouth/Throat:      Mouth: Mucous membranes are moist.      Pharynx: Oropharynx is clear. Cardiovascular:      Rate and Rhythm: Normal rate and regular rhythm. Pulmonary:      Effort: Pulmonary effort is normal.      Breath sounds: Normal breath sounds. Abdominal:      General: Abdomen is flat. Palpations: Abdomen is soft. Skin:     General: Skin is warm and dry. Capillary Refill: Capillary refill takes less than 2 seconds. Neurological:      Mental Status: She is alert.           Additional Data:     Lab Results:  Results from last 7 days   Lab Units 09/26/23  2338 09/20/23  1945   WBC Thousand/uL 10.04 12.52*   HEMOGLOBIN g/dL 13.1 14.2   HEMATOCRIT % 42.1 44.6   PLATELETS Thousands/uL 159 167   BANDS PCT %  --  2   NEUTROS PCT % 59  --    LYMPHS PCT % 28  --    LYMPHO PCT %  --  27   MONOS PCT % 10  -- MONO PCT %  --  10   EOS PCT % 1 0     Results from last 7 days   Lab Units 09/26/23  2338   SODIUM mmol/L 139   POTASSIUM mmol/L 3.9   CHLORIDE mmol/L 109*   CO2 mmol/L 21   BUN mg/dL 15   CREATININE mg/dL 0.64   ANION GAP mmol/L 9   CALCIUM mg/dL 8.4   ALBUMIN g/dL 3.5   TOTAL BILIRUBIN mg/dL 0.19*   ALK PHOS U/L 45   ALT U/L 15   AST U/L 14   GLUCOSE RANDOM mg/dL 102     Results from last 7 days   Lab Units 09/26/23  2338   INR  4.34*                   Lines/Drains:  Invasive Devices     Peripheral Intravenous Line  Duration           Peripheral IV 09/26/23 Right;Ventral (anterior) Forearm <1 day                  Imaging: Reviewed radiology reports from this admission including: chest xray  XR chest 1 view portable   ED Interpretation by Yaz Mariscal MD (09/26 3613)   No acute cardiopulmonary disease similar to last CXR from 9/15/2023      Final Result by Albina Rodríguez MD (09/27 1372)   Persistent subsegmental atelectasis left lung base               Workstation performed: [id number]               EKG and Other Studies Reviewed on Admission:   · EKG: NSR. HR 80.    ** Please Note: This note has been constructed using a voice recognition system.  **

## 2023-09-27 NOTE — CASE MANAGEMENT
Case Management Discharge Planning Note    Patient name Zulma Pelayo S /S -01 MRN [id number]  : 1970 Date 2023       Current Admission Date: 2023  Current Admission Diagnosis:Intentional overdose [facility])   Patient Active Problem List    Diagnosis Date Noted   • Intentional overdose ([address]) 2023   • Tremors of nervous system 2023   • Contact dermatitis and eczema 09/15/2023   • History of pulmonary embolus (PE) 2023   • Elevated troponin 2023   • Acute respiratory failure ([address]) 2023   • Cervicalgia 08/10/2023   • Medial epicondylitis of right elbow 2023   • Bilateral leg edema 2023   • Chronic migraine without aura without status migrainosus, not intractable 2023   • Chronic eczematous otitis externa of both ears 2023   • Intentional self-harm by unspecified sharp object, sequela ([address]) 2023   • Suicidal deliberate poisoning ([address]) 2022   • Knee pain, right 2022   • Platelets decreased ([address]) 2022   • GERD (gastroesophageal reflux disease) 2022   • Diarrhea 2022   • Ecchymosis 2022   • Anxiety 2022   • Borderline personality disorder ([address]) 2022   • Contusion of elbow 2021   • Cognitive impairment 10/07/2021   • Substance abuse ([address]) 2021   • Potential for cognitive impairment 2021   • Mood disorder ([address]) 2021   • Chronic tension-type headache, intractable 03/10/2021   • Morbid obesity with BMI of 40.0-44.9, adult ([address]) 2021   • History of COVID-19 2020   • Memory difficulties 2020   • BMI 45.0-49.9, adult ([address]) 2020   • Mixed hyperlipidemia 10/30/2019   • COPD (chronic obstructive pulmonary disease) ([address]) 2019   • Major depressive disorder 2019   • Sjogren's syndrome ([address]) 2019   • Primary osteoarthritis of both knees 2018   • MAG (obstructive sleep apnea) 2018   • Medial epicondylitis of elbow, left 04/03/2018   • Hemorrhage of rectum and anus 10/02/2017   • Overactive bladder 06/08/2017   • Schizoaffective disorder, bipolar type ([address]) 03/17/2017   • Hypothyroidism 03/17/2017   • Restrictive lung disease 02/01/2017   • Family history of renal cancer 04/26/2016   • Microhematuria 12/23/2015   • Yan's esophagus determined by biopsy 10/19/2015   • Medical clearance for psychiatric admission 09/14/2015   • DDD (degenerative disc disease), lumbar 04/09/2015   • Spondylosis of lumbosacral joint without myelopathy 04/09/2015   • Urinary incontinence 03/31/2015   • Benign essential hypertension 07/02/2014   • Edema 03/26/2014   • Chronic low back pain 04/07/2012   • Hypertension 10/25/2008      LOS (days): 0  Geometric Mean LOS (GMLOS) (days): 2.40  Days to GMLOS:2.3     OBJECTIVE:  Risk of Unplanned Readmission Score: 42.28         Current admission status: Inpatient   Preferred Pharmacy:   [address] [place] [address] [place], [address] [place] [address]  Phone: [phone] Fax: [phone]    Primary Care Provider: Cisco Fabry, CRNP    Primary Insurance: MEDICARE  Secondary Insurance: Winsome Randhawa    DISCHARGE DETAILS:   faxed [phone] and clinical documentation to following facilities to review for inpatient psych placement:    [facility]

## 2023-09-27 NOTE — ASSESSMENT & PLAN NOTE
· Will follow up to resume home medications.     Plan:  · Consult psychology, appreciate recommendations

## 2023-09-27 NOTE — ED PROVIDER NOTES
History  Chief Complaint   Patient presents with   • Overdose - Intentional     Pt took about 25 10mg pills of flexeril around 945pm tonight. +SI. Recovering Meth users, been clean for 26 days. Went to meeting today but was still not feeling well when she returned home. Pt reports shakiness and abdominal discomfort. Pt called EMS looking for help. 81-year-old female with bipolar, prior suicide attempts via cutting wrists and overdose presenting to the ED due to intentional Flexeril overdose. Patient states approximately half hour prior to ED arrival, she took 25 10 mg pills of Flexeril. Patient called EMS on herself. (+) suicidal ideation with plan. No homicidal ideation or plan. Patient is interested in voluntary committing herself to inpatient psych facility. Denies alcohol use. Does use recreational marijuana. Denies taking other medications. Upon arrival to the ED, patient reports feeling fatigued, nausea. Pt reports feeling depressed and anxious surrounding her medical problems, increased stress about starting a new job today. Lives alone in [facility]. Sees a therapist 3x/week, has psychiatrist. Denies vision changes, chest pain, abdominal pain, urinary symptoms, URI symptoms, fever or chills. Prior to Admission Medications   Prescriptions Last Dose Informant Patient Reported? Taking?    Cholecalciferol (Vitamin D3) 125 MCG (5000 UT) CAPS  Self Yes No   Sig: Take by mouth in the morning   Diclofenac Sodium (VOLTAREN) 1 %  Self No No   Sig: Apply 2 g topically 3 (three) times a day as needed (pain) For pain to affected area   Diclofenac Sodium (VOLTAREN) 1 %  Self No No   Sig: Apply 2 g topically 4 (four) times a day   Fesoterodine Fumarate ER (Toviaz) 4 MG TB24  Self Yes No   Sig: Take 1 tablet by mouth daily   Lurasidone HCl 60 MG TABS  Self No No   Sig: Take 1 tablet (60 mg total) by mouth 2 (two) times a day TAKEN IN THE MORNING AND AT NIGHT-----Latuda   RABEprazole (ACIPHEX) 20 MG tablet 9/27/2023 Self Yes Yes   Sig: Take 20 mg by mouth 2 (two) times a day   albuterol (Ventolin HFA) 90 mcg/act inhaler  Self No No   Sig: Inhale 2 puffs every 6 (six) hours as needed for wheezing   amLODIPine (NORVASC) 5 mg tablet 9/27/2023 Self No Yes   Sig: TAKE 1 TABLET (5 MG TOTAL) BY MOUTH DAILY   ammonium lactate (LAC-HYDRIN) 12 % cream  Self Yes No   buPROPion (WELLBUTRIN XL) 150 mg 24 hr tablet  Self No No   Sig: Take 1 tablet (150 mg total) by mouth daily   Patient not taking: Reported on 9/22/2023   buPROPion (WELLBUTRIN XL) 300 mg 24 hr tablet 9/27/2023 Self Yes Yes   Sig: Take 300 mg by mouth daily   carBAMazepine (CARBATROL) 300 MG 12 hr capsule  Self Yes No   Sig: Take 1 capsule by mouth in the morning   cefuroxime (CEFTIN) 500 mg tablet  Self No No   Sig: Take 1 tablet (500 mg total) by mouth every 12 (twelve) hours for 5 days   clobetasol (TEMOVATE) 0.05 % ointment  Self No No   Sig: Apply to affected areas sparingly. Do not exceed 2 weeks of treatment. colestipol (COLESTID) 1 g tablet  Self No No   Sig: Take 1 tablet (1 g total) by mouth 2 (two) times a day   cyproheptadine (PERIACTIN) 4 mg tablet  Self Yes No   divalproex sodium (DEPAKOTE) 250 mg EC tablet  Self No No   Sig: Take 3 tablets (750 mg total) by mouth every 12 (twelve) hours   doxycycline hyclate (VIBRAMYCIN) 100 mg capsule  Self No No   Sig: Take 1 capsule (100 mg total) by mouth 2 (two) times a day for 5 days   ergocalciferol (ERGOCALCIFEROL) 1.25 MG (35023 UT) capsule  Self Yes No   estradiol (ESTRACE) 0.5 MG tablet 9/27/2023 Self No Yes   Sig: Take 1 tablet (0.5 mg total) by mouth daily   fluticasone (FLOVENT HFA) 110 MCG/ACT inhaler  Self Yes No   Sig: Inhale 2 puffs as needed Rinse mouth after use.    furosemide (LASIX) 20 mg tablet 9/27/2023 Self No Yes   Sig: Take 1 tablet (20 mg total) by mouth daily   haloperidol decanoate (Haldol Decanoate) 50 mg/mL injection  Self Yes No   Sig: every 30 (thirty) days   hydrOXYzine HCL (ATARAX) 50 mg tablet  Self No No   Sig: Take 1 tablet (50 mg total) by mouth daily at bedtime   hydrocortisone 2.5 % ointment  Self No No   Sig: Apply topically 2 (two) times a day   levothyroxine (Euthyrox) 125 mcg tablet 9/27/2023 Self No Yes   Sig: Take 1 tablet (125 mcg total) by mouth daily in the early morning   losartan (COZAAR) 50 mg tablet 9/27/2023 Self No Yes   Sig: TAKE ONE TABLET BY MOUTH DAILY   memantine (NAMENDA) 10 mg tablet  Self Yes No   metoprolol tartrate (LOPRESSOR) 25 mg tablet 9/27/2023 Self No Yes   Sig: TAKE ONE TABLET BY MOUTH EVERY 12 HOURS   ondansetron (ZOFRAN-ODT) 4 mg disintegrating tablet  Self No No   Sig: Take 1 tablet (4 mg total) by mouth every 6 (six) hours as needed for nausea or vomiting   pilocarpine (SALAGEN) 5 mg tablet  Self Yes No   Sig: Take 5 mg by mouth 2 (two) times a day   rOPINIRole (REQUIP) 1 mg tablet 9/27/2023 Self Yes Yes   topiramate (TOPAMAX) 100 mg tablet 9/27/2023 Self No Yes   Sig: Take 1.5 tablets (150 mg total) by mouth 2 (two) times a day   Patient taking differently: Take 200 mg by mouth 2 (two) times a day   trospium chloride (SANCTURA) 20 mg tablet 9/27/2023 Self Yes Yes   Sig: Take 20 mg by mouth 2 (two) times a day   venlafaxine (EFFEXOR) 37.5 mg tablet  Self Yes No   Sig: Take 37.5 mg by mouth daily   warfarin (COUMADIN) 6 mg tablet  Self No No   Sig: Take 6mg (one tablet) daily every day by mouth.       Facility-Administered Medications: None       Past Medical History:   Diagnosis Date   • Anxiety    • Arthritis     oa cassandra knees   • Asthma     good control- no medications   • Yan's esophagus    • Bipolar affective disorder ([address])    • Cannabis abuse with unspecified 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[address])    • Chronic pain disorder     lumbar   • COPD (chronic obstructive pulmonary disease) (HCC)    • CPAP (continuous positive airway pressure) dependence    • Degenerative disc disease at L5-S1 level    • Deliberate self-cutting     9/15/22per pt has not done recently-- does see a therapist and psychiatrist regularly   • Depression 09/16/2008   • Disease of thyroid gland     hypo   • MARTINEZ (dyspnea on exertion)     per pt "has had this with exertion and not new"   • Drug overdose 10/28/2008    suicide attempt and again drug overdose 7/2022-- [facility] floor and than transferred to [facility]   • Dysphagia    • Dyspnea    • Edema     BLE   • Family history of blood clots    • GERD (gastroesophageal reflux disease)    • Headache(784.0)    • Headache, tension-type    • High blood pressure    • History of COVID-19 12/30/2020    Symptoms started on 12/30/2020 and then got worse. Today she is feeling a little bit better. She is a candidate for monoclonal antibody infusion and set for 1/6/21 @ 1pm at [facility]. 01/07/21 - telemedicine -    • Hyperlipidemia    • Hypertension    • Knee pain, bilateral     Especially right   • Medical marijuana use    • Memory loss    • Migraines    • Obesity    • Overactive bladder    • Sjogren's disease ([address])    • Sleep apnea    • Stress incontinence    • Suicidal ideations    • Teeth missing    • Tremor     per pt Tremors of Right Leg and both Arms   • Visual impairment    • Wears glasses        Past Surgical History:   Procedure Laterality Date   • BREAST CYST EXCISION Right 1989   • CARPAL TUNNEL RELEASE Left    • CHOLECYSTECTOMY  05/2003    Laparoscopic   • COLONOSCOPY      01/12/2009   • DILATION AND CURETTAGE OF UTERUS     • ELBOW SURGERY Left     x2.  No hardware   • ESOPHAGOGASTRODUODENOSCOPY     • FOOT SURGERY Left     Plantar fasciotomy   • HERNIA REPAIR     • HYSTERECTOMY      laporoscopic, partial   • KNEE ARTHROSCOPY Bilateral    • OOPHORECTOMY Left 10/2015   • SC GASTROCNEMIUS RECESSION Left 02/24/2021    Procedure: RECESSION GASTROC OPEN;  Surgeon: Marissa Rivers DPM;  Location: [address] [place] [address] OR;  Service: Podiatry   • SC RPR UMBILICAL HRNA 5 YRS/> REDUCIBLE N/A 04/24/2019    Procedure: REPAIR HERNIA UMBILICAL LAPAROSCOPIC W/ ROBOTICS;  Surgeon: Arliss Curling, MD;  Location: [facility] OR;  Service: General   • ME SPHINCTEROTOMY ANAL DIVISION SPHINCTER SPX N/A 2018    Procedure: EUA, LEFT PARTIAL INTERNAL SPHINCTEROTOMY;  Surgeon: Pablito Leos MD;  Location: [facility] MAIN OR;  Service: Colorectal   • ME TNOT ELBOW LATERAL/MEDIAL DEBRIDE OPEN TDN RPR Left 2022    Procedure: RELEASE EPICONDYLAR ELBOW MEDIAL;  Surgeon: Duran Osborn MD;  Location: [facility] MAIN OR;  Service: Orthopedics   • REDUCTION MAMMAPLASTY Bilateral    • REPAIR LACERATION Left     left hand-2009   • REPLACEMENT TOTAL KNEE Right    • ROTATOR CUFF REPAIR Left    • TONSILECTOMY AND ADNOIDECTOMY     • US GUIDED MSK PROCEDURE  2021   • VASCULAR SURGERY     • VEIN LIGATION Bilateral    • WISDOM TOOTH EXTRACTION         Family History   Problem Relation Age of Onset   • Kidney cancer Mother    • Diabetes Mother    • Depression Mother    • Stroke Mother    • Arthritis Mother    • Cancer Mother    • Psychiatric Illness Mother    • No Known Problems Father    • No Known Problems Sister    • No Known Problems Maternal Grandmother    • No Known Problems Maternal Grandfather    • No Known Problems Paternal Grandmother    • No Known Problems Paternal Grandfather    • Colon cancer Maternal Uncle    • Colon cancer Maternal Uncle    • Colon cancer Paternal Uncle    • Colon cancer Family    • Alcohol abuse Sister    • Asthma Sister      I have reviewed and agree with the history as documented.     E-Cigarette/Vaping   • E-Cigarette Use Current Some Day User    • Comments medical THC      E-Cigarette/Vaping Substances   • Nicotine No    • THC Yes    • CBD No    • Flavoring No    • Other No    • Unknown No      Social History     Tobacco Use   • Smoking status: Former     Packs/day: 2.00     Years: 30.00     Total pack years: 60.00     Types: Cigarettes     Start date: 1985     Quit date: 2018     Years since quittin.7   • Smokeless tobacco: Never   • Tobacco comments:     Started at age 14. Vaping Use   • Vaping Use: Some days   • Substances: THC   Substance Use Topics   • Alcohol use: Not Currently     Comment: Recovering alcoholic   • Drug use: Yes     Types: Marijuana, Methamphetamines     Comment: Trying to get off meth        Review of Systems   Constitutional: Positive for fatigue. Negative for chills and fever. HENT: Negative for ear pain and sore throat. Eyes: Negative for pain and visual disturbance. Respiratory: Negative for cough and shortness of breath. Cardiovascular: Negative for chest pain and palpitations. Gastrointestinal: Positive for nausea. Negative for abdominal pain and vomiting. Genitourinary: Negative for dysuria and hematuria. Musculoskeletal: Negative for arthralgias and back pain. Skin: Negative for color change and rash. Neurological: Negative for seizures and syncope. Psychiatric/Behavioral: Positive for suicidal ideas. Negative for hallucinations. The patient is nervous/anxious. All other systems reviewed and are negative. Physical Exam  ED Triage Vitals   Temperature Pulse Respirations Blood Pressure SpO2   09/26/23 2236 09/26/23 2236 09/26/23 2236 09/26/23 2236 09/26/23 2236   98.7 °F (37.1 °C) 82 22 162/75 94 %      Temp Source Heart Rate Source Patient Position - Orthostatic VS BP Location FiO2 (%)   09/26/23 2236 09/26/23 2236 09/27/23 0230 09/26/23 2236 --   Oral Monitor Lying Right arm       Pain Score       --                    Orthostatic Vital Signs  Vitals:    09/27/23 0130 09/27/23 0221 09/27/23 0230 09/27/23 0300   BP: 120/57 147/76 147/76 120/63   Pulse: 86 88 86 79   Patient Position - Orthostatic VS:   Lying        Physical Exam  Vitals and nursing note reviewed. Constitutional:       General: She is not in acute distress. Appearance: She is well-developed. She is obese. HENT:      Head: Normocephalic and atraumatic.       Nose: Nose normal. Mouth/Throat:      Mouth: Mucous membranes are moist.      Pharynx: Oropharynx is clear. Eyes:      Conjunctiva/sclera: Conjunctivae normal.      Pupils: Pupils are equal, round, and reactive to light. Cardiovascular:      Rate and Rhythm: Normal rate and regular rhythm. Pulses: Normal pulses. Radial pulses are 2+ on the right side and 2+ on the left side. Dorsalis pedis pulses are 2+ on the right side and 2+ on the left side. Heart sounds: Normal heart sounds, S1 normal and S2 normal. No murmur heard. Pulmonary:      Effort: Pulmonary effort is normal. No respiratory distress. Breath sounds: Normal breath sounds and air entry. Abdominal:      Palpations: Abdomen is soft. Tenderness: There is no abdominal tenderness. Musculoskeletal:         General: No swelling. Cervical back: Normal range of motion and neck supple. Right lower leg: No edema. Left lower leg: No edema. Skin:     General: Skin is warm and dry. Capillary Refill: Capillary refill takes less than 2 seconds. Neurological:      General: No focal deficit present. Mental Status: She is alert and oriented to person, place, and time. GCS: GCS eye subscore is 4. GCS verbal subscore is 5. GCS motor subscore is 6. Psychiatric:         Attention and Perception: Attention and perception normal.         Mood and Affect: Mood is depressed. Affect is flat. Behavior: Behavior is withdrawn. Behavior is cooperative. Thought Content: Thought content is not paranoid or delusional. Thought content includes suicidal ideation. Thought content does not include homicidal ideation. Thought content includes suicidal plan. Thought content does not include homicidal plan. Comments: Very cooperative, flat affect.          ED Medications  Medications   sodium chloride 0.9 % bolus 1,000 mL (0 mL Intravenous Stopped 9/27/23 1576)   ondansetron (ZOFRAN) injection 4 mg (4 mg Intravenous Given 9/26/23 2348)   OLANZapine (ZyPREXA) 10 mg IM injection **ADS Override Pull** (10 mg  Given 9/27/23 0102)   sterile water injection **ADS Override Pull** (10 mL  Given 9/27/23 0103)   rOPINIRole (REQUIP) tablet 1 mg (1 mg Oral Given 9/27/23 0212)   LORazepam (ATIVAN) injection 1 mg (1 mg Intravenous Given 9/27/23 0247)       Diagnostic Studies  Results Reviewed     Procedure Component Value Units Date/Time    Comprehensive metabolic panel [[id number]]  (Abnormal) Collected: 09/26/23 2338    Lab Status: Final result Specimen: Blood from Arm, Right Updated: 09/27/23 0116     Sodium 139 mmol/L      Potassium 3.9 mmol/L      Chloride 109 mmol/L      CO2 21 mmol/L      ANION GAP 9 mmol/L      BUN 15 mg/dL      Creatinine 0.64 mg/dL      Glucose 102 mg/dL      Calcium 8.4 mg/dL      AST 14 U/L      ALT 15 U/L      Alkaline Phosphatase 45 U/L      Total Protein 5.9 g/dL      Albumin 3.5 g/dL      Total Bilirubin 0.19 mg/dL      eGFR 103 ml/min/1.73sq m     Narrative:      [place] guidelines for Chronic Kidney Disease (CKD):   •  Stage 1 with normal or high GFR (GFR > 90 mL/min/1.73 square meters)  •  Stage 2 Mild CKD (GFR = 60-89 mL/min/1.73 square meters)  •  Stage 3A Moderate CKD (GFR = 45-59 mL/min/1.73 square meters)  •  Stage 3B Moderate CKD (GFR = 30-44 mL/min/1.73 square meters)  •  Stage 4 Severe CKD (GFR = 15-29 mL/min/1.73 square meters)  •  Stage 5 End Stage CKD (GFR <15 mL/min/1.73 square meters)  Note: GFR calculation is accurate only with a steady state creatinine    Lipase [[id number]]  (Normal) Collected: 09/26/23 2338    Lab Status: Final result Specimen: Blood from Arm, Right Updated: 09/27/23 0116     Lipase 27 u/L     Salicylate level [[id number]]  (Normal) Collected: 09/26/23 2338    Lab Status: Final result Specimen: Blood from Arm, Right Updated: 86/06/01 0071     Salicylate Lvl <5 mg/dL     Acetaminophen level-If concentration is detectable, please discuss with medical  on call. [[id number]]  (Abnormal) Collected: 09/26/23 2338    Lab Status: Final result Specimen: Blood from Arm, Right Updated: 09/27/23 0116     Acetaminophen Level <10 ug/mL     CK [[id number]]  (Normal) Collected: 09/26/23 2338    Lab Status: Final result Specimen: Blood from Arm, Right Updated: 09/27/23 0045     Total CK 52 U/L     Ethanol [[id number]]  (Normal) Collected: 09/26/23 2338    Lab Status: Final result Specimen: Blood from Arm, Right Updated: 09/27/23 0045     Ethanol Lvl <10 mg/dL     Protime-INR [[id number]]  (Abnormal) Collected: 09/26/23 2338    Lab Status: Final result Specimen: Blood from Arm, Right Updated: 09/27/23 0044     Protime 42.7 seconds      INR 4.34    Narrative:      Verified by repeat    APTT [[id number]]  (Abnormal) Collected: 09/26/23 2338    Lab Status: Final result Specimen: Blood from Arm, Right Updated: 09/27/23 0044     PTT 61 seconds     Narrative:      Verified by repeat    TSH, 3rd generation with Free T4 reflex [[id number]]  (Abnormal) Collected: 09/26/23 2338    Lab Status: Final result Specimen: Blood from Arm, Right Updated: 09/27/23 0031     TSH 3RD GENERATON 14.259 uIU/mL     T4, free [[id number]] Collected: 09/26/23 2338    Lab Status:  In process Specimen: Blood from Arm, Right Updated: 09/27/23 0031    CBC and differential [[id number]]  (Abnormal) Collected: 09/26/23 2338    Lab Status: Final result Specimen: Blood from Arm, Right Updated: 09/26/23 2350     WBC 10.04 Thousand/uL      RBC 4.39 Million/uL      Hemoglobin 13.1 g/dL      Hematocrit 42.1 %      MCV 96 fL      MCH 29.8 pg      MCHC 31.1 g/dL      RDW 14.1 %      MPV 10.2 fL      Platelets 044 Thousands/uL      nRBC 0 /100 WBCs      Neutrophils Relative 59 %      Immat GRANS % 2 %      Lymphocytes Relative 28 %      Monocytes Relative 10 %      Eosinophils Relative 1 %      Basophils Relative 0 %      Neutrophils Absolute 5.88 Thousands/µL      Immature Grans Absolute 0.17 Thousand/uL Lymphocytes Absolute 2.79 Thousands/µL      Monocytes Absolute 1.03 Thousand/µL      Eosinophils Absolute 0.13 Thousand/µL      Basophils Absolute 0.04 Thousands/µL     Rapid drug screen, urine [[id number]]  (Abnormal) Collected: 09/26/23 2250    Lab Status: Final result Specimen: Urine, Clean Catch Updated: 09/26/23 2318     Amph/Meth UR Negative     Barbiturate Ur Negative     Benzodiazepine Urine Negative     Cocaine Urine Negative     Methadone Urine Negative     Opiate Urine Negative     PCP Ur Negative     THC Urine Positive     Oxycodone Urine Negative    Narrative:      Presumptive report. If requested, specimen will be sent to reference lab for confirmation. FOR MEDICAL PURPOSES ONLY. IF CONFIRMATION NEEDED PLEASE CONTACT THE LAB WITHIN 5 DAYS.     Drug Screen Cutoff Levels:  AMPHETAMINE/METHAMPHETAMINES  1000 ng/mL  BARBITURATES     200 ng/mL  BENZODIAZEPINES     200 ng/mL  COCAINE      300 ng/mL  METHADONE      300 ng/mL  OPIATES      300 ng/mL  PHENCYCLIDINE     25 ng/mL  THC       50 ng/mL  OXYCODONE      100 ng/mL    POCT alcohol breath test [[id number]]  (Normal) Resulted: 09/26/23 2251    Lab Status: Final result Updated: 09/26/23 2252     EXTBreath Alcohol 0.00                 XR chest 1 view portable   ED Interpretation by Jess Sellers MD (09/26 2351)   No acute cardiopulmonary disease similar to last CXR from 9/15/2023            Procedures  ECG 12 Lead Documentation Only    Date/Time: 9/26/2023 11:44 PM    Performed by: Jess Sellers MD  Authorized by: Jess Sellers MD    Indications / Diagnosis:  Overdose  Patient location:  ED  Previous ECG:     Previous ECG:  Compared to current    Comparison ECG info:  9/20/23    Similarity:  No change  Interpretation:     Interpretation: normal    Rate:     ECG rate:  80    ECG rate assessment: normal    Rhythm:     Rhythm: sinus rhythm    Ectopy:     Ectopy: none    QRS:     QRS axis:  Normal    QRS intervals:  Normal  Conduction:     Conduction: normal    ST segments:     ST segments:  Normal  T waves:     T waves: normal    Comments:      NSR 80 bpm, normal axis, normal intervals, no ST elevations or depressions, no T wave inversions. ED Course  ED Course as of 09/27/23 0325   Tue Sep 26, 2023   2316 EXTBreath Alcohol: 0.00   [facility] URINE(!): Positive  UDS otherwise negative   2351 CBC wnl   Wed Sep 27, 2023   0003 Text from RN; pt refusing charcoal. I spoke with pt, discussed risks/benefits to charcoal; pt still adamantly refusing at this time because she "does not want to drink it". Will not give charcoal at this time   0034 TSH 3RD GENERATON(!): 14.259  Pt already on Levothyroxine   0103 POCT INR(!): 4.34  Supratherapeutic; will hold coumadin tonight   0230 Patient cursing at PCA, ripped pure wick off and threw it across the room. Patient now resting in bed. Ativan ordered. [facility] with poison control: states to watch pt for 6-8 hrs    0256 SLIM texted for [address] Making  78-year-old female with bipolar, prior suicide attempts via cutting wrists and overdose presenting to the ED due to intentional Flexeril overdose. Patient states approximately half hour prior to ED arrival, she took 25 10 mg pills of Flexeril. Patient called EMS on herself. (+) suicidal ideation with plan. No homicidal ideation or plan. Patient is interested in voluntary committing herself to inpatient psych facility. Denies alcohol use. Does use recreational marijuana. Denies taking other medications. Upon arrival to the ED, patient reports feeling fatigued, nausea. Pt reports feeling depressed and anxious surrounding her medical problems, increased stress about starting a new job today. Lives alone in [facility]. Sees a therapist 3x/week, has psychiatrist.    Will obtain labs, ECG, CXR. Labs notable for positive THC on UDS, supratherapeutic INR while on Coumadin.  Elevated TSH, patient has known hypothyroidism, is already on levothyroxine. Patient remained alert and oriented throughout ED stay. Case was discussed with poison control who recommended observation for 6 to 8 hours. Will admit to internal medicine prior to psych clearance. Amount and/or Complexity of Data Reviewed  External Data Reviewed: notes. Labs: ordered. Decision-making details documented in ED Course. Radiology: ordered and independent interpretation performed. ECG/medicine tests: ordered and independent interpretation performed. Risk  Prescription drug management. Parenteral controlled substances. Decision regarding hospitalization. Disposition  Final diagnoses:   Intentional overdose of drug in tablet form ([address])   Suicidal ideation   Suicidal intent   Supratherapeutic INR   Elevated TSH     Time reflects when diagnosis was documented in both MDM as applicable and the Disposition within this note     Time User Action Codes Description Comment    9/26/2023 11:22 PM Elmer Peter Intentional overdose of drug in tablet form ([address])     9/26/2023 11:22 PM Miah Deleon Add [U11.770] Suicidal ideation     9/26/2023 11:23 PM [place], [address] [place] Suicidal intent     9/27/2023  2:54 AM Miah Deleon Add [R79.1] Supratherapeutic INR     9/27/2023  2:54 AM Miah Deleon Add [R79.89] Elevated TSH       ED Disposition     ED Disposition   Admit    Condition   Stable    Date/Time   Wed Sep 27, 2023  3:03 AM    Comment   Case was discussed with SLIM AP and the patient's admission status was agreed to be Admission Status: observation status to the service of Dr. Willis Caro .           Follow-up Information    None         Patient's Medications   Discharge Prescriptions    No medications on file     No discharge procedures on file.     PDMP Review       Value Time User    PDMP Reviewed  Yes 9/26/2023 11:01 PM Kya Rivera MD           ED Provider  Attending physically available and evaluated Kalani Grimes. I managed the patient along with the ED Attending.     Electronically Signed by         Aníbal Sheppard MD  09/27/23 6926

## 2023-09-27 NOTE — ASSESSMENT & PLAN NOTE
· Past medical history of prior suicide attempts. Presented with suicide attempt by taking 25 pills of Flexeril 10 mg pills. Per ED note, patient called EMS herself and wanted to voluntarily commit herself to inpatient psych facility. · 9/27 patient signed a 201. Did attempt to flee later in the day so control team called. Plan:  · Patient medically stable for transfer to inpatient psych facility.

## 2023-09-27 NOTE — QUICK NOTE
Patient insisting on leaving. Pt threw shayy across the room as well as other items in the room. Pt ripped IV out and walked out of the room to the stairwell refusing to stop and return to the room. Control team called.

## 2023-09-27 NOTE — ED NOTES
This RN spoke to the on call rep, Gilford Sexton, for [facility] at [phone] per pt request. Per [facility] rep, pt called [facility] multiple times today after an incident at work where the pt did not get along with a coworker. Per [facility] rep, pt did not say anything about SI and later in the day IOP sent police to pts residence where pt reportedly denied SI again. Per [facility] rep pt has a history of borderline behavior and has an appointment tomorrow at noon with Dr. Ca Tesfaye. [facility] rep will be stopping by in the morning to speak to pt and crisis worker. Provider aware.       Serena Tejeda, GUS  09/27/23 0003

## 2023-09-27 NOTE — ASSESSMENT & PLAN NOTE
· Per ED note, patient reports feeling depressed and anxious regarding her medical problems and increased stress about her new job. She sees a therapist 3 times a week and psychiatrist as well. Plan:  · Consult psychology, appreciate recommendations/med review.

## 2023-09-27 NOTE — PLAN OF CARE
Problem: Potential for Falls  Goal: Patient will remain free of falls  Description: INTERVENTIONS:  - Educate patient/family on patient safety including physical limitations  - Instruct patient to call for assistance with activity   - Consult OT/PT to assist with strengthening/mobility   - Keep Call bell within reach  - Keep bed low and locked with side rails adjusted as appropriate  - Keep care items and personal belongings within reach  - Initiate and maintain comfort rounds  - Make Fall Risk Sign visible to staff  - Offer Toileting every  Hours, in advance of need  - Initiate/Maintain alarm  - Obtain necessary fall risk management equipment:   - Apply yellow socks and bracelet for high fall risk patients  - Consider moving patient to room near nurses station  Outcome: Progressing     Problem: MOBILITY - ADULT  Goal: Maintain or return to baseline ADL function  Description: INTERVENTIONS:  -  Assess patient's ability to carry out ADLs; assess patient's baseline for ADL function and identify physical deficits which impact ability to perform ADLs (bathing, care of mouth/teeth, toileting, grooming, dressing, etc.)  - Assess/evaluate cause of self-care deficits   - Assess range of motion  - Assess patient's mobility; develop plan if impaired  - Assess patient's need for assistive devices and provide as appropriate  - Encourage maximum independence but intervene and supervise when necessary  - Involve family in performance of ADLs  - Assess for home care needs following discharge   - Consider OT consult to assist with ADL evaluation and planning for discharge  - Provide patient education as appropriate  Outcome: Progressing  Goal: Maintains/Returns to pre admission functional level  Description: INTERVENTIONS:  - Perform BMAT or MOVE assessment daily.   - Set and communicate daily mobility goal to care team and patient/family/caregiver.    - Collaborate with rehabilitation services on mobility goals if consulted  - Perform Range of Motion  times a day. - Reposition patient every  hours.   - Dangle patient  times a day  - Stand patient  times a day  - Ambulate patient  times a day  - Out of bed to chair  times a day   - Out of bed for meals times a day  - Out of bed for toileting  - Record patient progress and toleration of activity level   Outcome: Progressing     Problem: SAFETY,RESTRAINT: NV/NON-SELF DESTRUCTIVE BEHAVIOR  Goal: Remains free of harm/injury (restraint for non violent/non self-detsructive behavior)  Description: INTERVENTIONS:  - Instruct patient/family regarding restraint use   - Assess and monitor physiologic and psychological status   - Provide interventions and comfort measures to meet assessed patient needs   - Identify and implement measures to help patient regain control  - Assess readiness for release of restraint   Outcome: Progressing  Goal: Returns to optimal restraint-free functioning  Description: INTERVENTIONS:  - Assess the patient's behavior and symptoms that indicate continued need for restraint  - Identify and implement measures to help patient regain control  - Assess readiness for release of restraint   Outcome: Progressing

## 2023-09-27 NOTE — ED ATTENDING ATTESTATION
9/26/2023  I, Yoli Lombardo MD, saw and evaluated the patient. I have discussed the patient with the resident/non-physician practitioner and agree with the resident's/non-physician practitioner's findings, Plan of Care, and MDM as documented in the resident's/non-physician practitioner's note, except where noted. All available labs and Radiology studies were reviewed. I was present for key portions of any procedure(s) performed by the resident/non-physician practitioner and I was immediately available to provide assistance. At this point I agree with the current assessment done in the Emergency Department. I have conducted an independent evaluation of this patient a history and physical is as follows: Patient is a 47year old female who took 25 10 mg flexeril pills tonight about 45 minutes prior to arrival in suicide attempt tonight. (+) nausea. (+) weakness and sob. Has sleep apnea. Has had prior suicidal attempts. No fever. No vomiting. Uses medical marijuana. Has had prior hysterectomy. Patient is on coumadin for PE. Was last seen at [address] [place] [address] in [facility] on 9/22/23 for cognitive impairment. [facility] website checked on this patient and last Rx filled was on 8/30/23 for valium for 1 day supply. NCAT. Moist mucous membranes. Lungs clear. Heart regular without murmur. Abdomen soft and nontender. Good bowel sounds. (+) LE edema. Multiple ecchymoses noted. No focal deficits. Somewhat flat affect. DDx including but not limited to: intentional overdose, accidental overdose, metabolic abnormality, depression, suicide attempt; doubt intracranial process, cardiac etiology; substance abuse; doubt aspiration pneumonitis. Will check labs, EKG, CXR and give activated charcoal and will notify poison control.      ED Course         Critical Care Time  Procedures

## 2023-09-27 NOTE — ASSESSMENT & PLAN NOTE
· Past medical history of prior suicide attempts. Presented with suicide attempt by taking 25 pills of Flexeril 10 mg pills. Per ED note, patient called EMS herself and wanted to voluntarily commit herself to inpatient psych facility. In the ED patient received lorazepam and olanzapine, by the time I went to see her she was in deep sleep. · CMP, CBC unremarkable  · INR supratherapeutic at 4.34, tox screen positive for THC, TSH elevated 14.259  · EKG normal sinus rhythm 80  · Chest x-ray concerning for congestion or atelectasis on left side. Plan:  · Per poison control, monitor in observation for 6 to 8 hours.   · Consult psych, appreciate recommendations

## 2023-09-27 NOTE — ASSESSMENT & PLAN NOTE
· TSH elevated at 14.259, free T4 = .80 indicating subclinical hypothyroidism. · Home dose levothyroxine 125 mcg. Plan:  · Resume levothyroxine 125 mcg.

## 2023-09-27 NOTE — PLAN OF CARE
Problem: SAFETY,RESTRAINT: NV/NON-SELF DESTRUCTIVE BEHAVIOR  Goal: Remains free of harm/injury (restraint for non violent/non self-detsructive behavior)  Description: INTERVENTIONS:  - Instruct patient/family regarding restraint use   - Assess and monitor physiologic and psychological status   - Provide interventions and comfort measures to meet assessed patient needs   - Identify and implement measures to help patient regain control  - Assess readiness for release of restraint   9/27/2023 0627 by Rodolfo Kelly RN  Outcome: Completed  9/27/2023 0543 by Rodolfo Kelly RN  Outcome: Progressing  Goal: Returns to optimal restraint-free functioning  Description: INTERVENTIONS:  - Assess the patient's behavior and symptoms that indicate continued need for restraint  - Identify and implement measures to help patient regain control  - Assess readiness for release of restraint   9/27/2023 0627 by Rodolfo Kelly RN  Outcome: Completed  9/27/2023 0543 by Rodolfo Kelly RN  Outcome: Progressing

## 2023-09-27 NOTE — ASSESSMENT & PLAN NOTE
Lab Results   Component Value Date    INR 2.35 (H) 09/28/2023    INR 4.36 (H) 09/27/2023   Home warfarin dose 6mg daily    Plan:  · Resume warfarin at 4mg daily.

## 2023-09-27 NOTE — ASSESSMENT & PLAN NOTE
· INR supratherapeutic at 4.34.  · She was on DOAC but there was some concerns about drug to drug interactions with her psych meds thus she was switched to warfarin. Plan:  · Hold warfarin for now.

## 2023-09-27 NOTE — TELEMEDICINE
TeleConsultation - [facility] 47 y.o. female MRN: [id number]  Unit/Bed#: S -01 Encounter: [id number]        REQUIRED DOCUMENTATION:     1. This service was provided via Telemedicine. 2. Provider located at [facility].  3. TeleMed provider: David Roblero MD.  4. Identify all parties in room with patient during tele consult:  pt  5. Patient was then informed that this was a Telemedicine visit and that the exam was being conducted confidentially over secure lines. My office door was closed. No one else was in the room. Patient acknowledged consent and understanding of privacy and security of the Telemedicine visit, and gave us permission to have the assistant stay in the room in order to assist with the history and to conduct the exam.  I informed the patient that I have reviewed their record in Epic and presented the opportunity for them to ask any questions regarding the visit today. The patient agreed to participate. Assessment/Plan     Present on Admission:  • Hypothyroidism  • History of pulmonary embolus (PE)  • Major depressive disorder  • Intentional overdose [facility])    Assessment:    41-year-old female who reports history of bipolar disorder, anxiety, ADHD, methamphetamine use disorder who presents following intentional overdose in a suicide attempt. Treatment Plan:    Upon medical clearance, inpatient psychiatric treatment is indicated voluntarily, and if not then involuntarily, for provision of precautions, further diagnostic evaluation and treatment stabilization. Continual observation suicide precautions are indicated. When medically cleared to do so, recommend resuming preadmission psychiatric medications following pharmacy verification. Reconsult psychiatry as needed.     Current Medications:     Current Facility-Administered Medications   Medication Dose Route Frequency Provider Last Rate   • albuterol  2 puff Inhalation Q6H PRN Miller Bella DO     • amLODIPine  5 mg Oral Daily Basanta Shayne, DO     • buPROPion  300 mg Oral Daily Basanta Shayne, DO     • estradiol  0.5 mg Oral Daily Basanta Shayne, DO     • fluticasone  1 puff Inhalation Daily Basanta Shayne, DO     • furosemide  20 mg Oral Daily Basanta Shayne, DO     • levothyroxine  125 mcg Oral Early Morning Basanta Shayne, DO     • ondansetron  4 mg Oral Q6H PRN Basanta Shayne, DO     • oxybutynin  5 mg Oral BID Basanta Shayne, DO     • pantoprazole  20 mg Oral BID AC Basanta Shayne, DO     • topiramate  200 mg Oral BID Basanta Shayne, DO         Risks / Benefits of Treatment:    Risks, benefits, and possible side effects of medications explained to patient and patient verbalizes understanding. Other treatment modalities recommended as indicated:    · Inpatient psychiatric treatment      Inpatient consult to Psychiatry  Consult performed by: Renee Phillips MD  Consult ordered by: Kimmie Perera DO        Physician Requesting Consult: Tameka Suarez MD  Principal Problem:Intentional overdose [facility])    Reason for Consult: Intentional overdose      History of Present Illness      Patient is a 47 y.o. female who has presented to the hospital where the resident hospitalist has documented the following in the H&P:  Aleksander Scott is a 47 y.o. female with a PMH of prior suicide attempts via cutting wrist,methamphetamine abuse, depression, anxiety, schizoaffective disorder, hypothyroidism, MAG on BiPAP, COPD, prior PE on warfarin who presents with suicide attempt by taking 25 of 10 mg flexeril pills. Per ED note, positive for suicidal ideation with plan. No homicidal ideation or plan. Patient wanted to voluntarily commit herself to inpatient psych facility. She also endorsed to nausea, weakness and shortness of breath. She also reported feeling depressed and anxious surrounding her medical problems and increased stress about her new job.  She lives alone in [facility] and sees a therapist 3 times a week as a psychiatrist. Endorses to recreational marijuana use. GCS 15 at the ED.     Review of Systems:  Review of Systems   Unable to perform ROS: Other         Past Medical and Surgical History:   Medical History        Past Medical History:   Diagnosis Date   • Anxiety     • Arthritis       oa cassandra knees   • Asthma       good control- no medications   • Yan's esophagus     • Bipolar affective disorder (HCC)     • Cannabis abuse with unspecified 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HCC)     • Chronic pain disorder       lumbar   • COPD (chronic obstructive pulmonary disease) (HCC)     • CPAP (continuous positive airway pressure) dependence     • Degenerative disc disease at L5-S1 level     • Deliberate self-cutting       9/15/22per pt has not done recently-- does see a therapist and psychiatrist regularly   • Depression 09/16/2008   • Disease of thyroid gland       hypo   • MARTINEZ (dyspnea on exertion)       per pt "has had this with exertion and not new"   • Drug overdose 10/28/2008     suicide attempt and again drug overdose 7/2022--[facility] -Medical floor and than transferred to [facility]   • Dysphagia     • Dyspnea     • Edema       BLE   • Family history of blood clots     • GERD (gastroesophageal reflux disease)     • Headache(784.0)     • Headache, tension-type     • High blood pressure     • History of COVID-19 12/30/2020     Symptoms started on 12/30/2020 and then got worse. Today she is feeling a little bit better.   She is a candidate for monoclonal antibody infusion and set for 1/6/21 @ 1pm at [facility]. 01/07/21 - telemedicine -    • Hyperlipidemia     • Hypertension     • Knee pain, bilateral       Especially right   • Medical marijuana use     • Memory loss     • Migraines     • Obesity     • Overactive bladder     • Sjogren's disease ([address])     • Sleep apnea     • Stress incontinence     • Suicidal ideations     • Teeth missing     • Tremor       per pt Tremors of Right Leg and both Arms   • Visual impairment     • Wears glasses              Surgical History         Past Surgical History:   Procedure Laterality Date   • BREAST CYST EXCISION Right 1989   • CARPAL TUNNEL RELEASE Left     • CHOLECYSTECTOMY   05/2003     Laparoscopic   • COLONOSCOPY         01/12/2009   • DILATION AND CURETTAGE OF UTERUS       • ELBOW SURGERY Left       x2. No hardware   • ESOPHAGOGASTRODUODENOSCOPY       • FOOT SURGERY Left       Plantar fasciotomy   • HERNIA REPAIR       • HYSTERECTOMY         laporoscopic, partial   • KNEE ARTHROSCOPY Bilateral     • OOPHORECTOMY Left 10/2015   • NC GASTROCNEMIUS RECESSION Left 02/24/2021     Procedure: RECESSION GASTROC OPEN;  Surgeon: Marissa Rivers DPM;  Location: [address] [place] [address] MAIN OR;  Service: Podiatry   • NC RPR UMBILICAL HRNA 5 YRS/> REDUCIBLE N/A 04/24/2019     Procedure: REPAIR HERNIA UMBILICAL LAPAROSCOPIC W/ ROBOTICS;  Surgeon: Cat Borrego MD;  Location: [facility] Main OR;  Service: General   • NC SPHINCTEROTOMY ANAL DIVISION SPHINCTER SPX N/A 08/31/2018     Procedure: EUA, LEFT PARTIAL INTERNAL SPHINCTEROTOMY;  Surgeon: Cathleen Ren MD;  Location: [address] [place] [address] OR;  Service: Colorectal   • NC TNOT ELBOW LATERAL/MEDIAL DEBRIDE OPEN TDN RPR Left 09/20/2022     Procedure: RELEASE EPICONDYLAR ELBOW MEDIAL;  Surgeon: Darlene Quiroga MD;  Location: [facility] MAIN OR;  Service: Orthopedics   • REDUCTION MAMMAPLASTY Bilateral 1999   • REPAIR LACERATION Left       left hand-5/18/2009   • REPLACEMENT TOTAL KNEE Right     • ROTATOR CUFF REPAIR Left     • TONSILECTOMY AND ADNOIDECTOMY       • US GUIDED MSK PROCEDURE   04/22/2021   • VASCULAR SURGERY       • VEIN LIGATION Bilateral     • WISDOM TOOTH EXTRACTION                Meds/Allergies:          Prior to Admission medications    Medication Sig Start Date End Date Taking?  Authorizing Provider   amLODIPine (NORVASC) 5 mg tablet TAKE 1 TABLET (5 MG TOTAL) BY MOUTH DAILY 7/12/23   Yes JHONATAN Hu   buPROPion (WELLBUTRIN XL) 300 mg 24 hr tablet Take 300 mg by mouth daily 9/16/22   Yes Historical Provider, MD   estradiol (ESTRACE) 0.5 MG tablet Take 1 tablet (0.5 mg total) by mouth daily 5/4/23   Yes Nat Agosto DO   furosemide (LASIX) 20 mg tablet Take 1 tablet (20 mg total) by mouth daily 9/6/23   Yes JHONATAN William   levothyroxine (Euthyrox) 125 mcg tablet Take 1 tablet (125 mcg total) by mouth daily in the early morning 3/16/23   Yes JHONATAN Ewing   losartan (COZAAR) 50 mg tablet TAKE ONE TABLET BY MOUTH DAILY 3/29/23   Yes JHONATAN Ewing   metoprolol tartrate (LOPRESSOR) 25 mg tablet TAKE ONE TABLET BY MOUTH EVERY 12 HOURS 9/13/23   Yes JHONATAN William   RABEprazole (ACIPHEX) 20 MG tablet Take 20 mg by mouth 2 (two) times a day     Yes Historical Provider, MD   rOPINIRole (REQUIP) 1 mg tablet   9/26/22   Yes Historical Provider, MD   topiramate (TOPAMAX) 100 mg tablet Take 1.5 tablets (150 mg total) by mouth 2 (two) times a day  Patient taking differently: Take 200 mg by mouth 2 (two) times a day 1/26/23 9/27/23 Yes JHONATAN William   trospium chloride (SANCTURA) 20 mg tablet Take 20 mg by mouth 2 (two) times a day 1/12/23 1/12/24 Yes Historical Provider, MD   albuterol (Ventolin HFA) 90 mcg/act inhaler Inhale 2 puffs every 6 (six) hours as needed for wheezing 9/15/23     Gaurav Goodman DO   ammonium lactate (LAC-HYDRIN) 12 % cream   8/2/23     Historical Provider, MD   buPROPion (WELLBUTRIN XL) 150 mg 24 hr tablet Take 1 tablet (150 mg total) by mouth daily  Patient not taking: Reported on 9/22/2023 7/26/22 9/15/23   Miguel Godinez DO   carBAMazepine (CARBATROL) 300 MG 12 hr capsule Take 1 capsule by mouth in the morning 11/23/22     Historical Provider, MD   Cholecalciferol (Vitamin D3) 125 MCG (5000 UT) CAPS Take by mouth in the morning 5/25/22     Historical Provider, MD   clobetasol (TEMOVATE) 0.05 % ointment Apply to affected areas sparingly. Do not exceed 2 weeks of treatment.  9/6/23     JHONATAN William   colestipol (COLESTID) 1 g tablet Take 1 tablet (1 g total) by mouth 2 (two) times a day 7/16/22 9/22/23   Varun Eden MD   cyproheptadine (PERIACTIN) 4 mg tablet         Historical Provider, MD   Diclofenac Sodium (VOLTAREN) 1 % Apply 2 g topically 3 (three) times a day as needed (pain) For pain to affected area 5/5/23     Zoila Urbina PA-C   Diclofenac Sodium (VOLTAREN) 1 % Apply 2 g topically 4 (four) times a day 7/17/23     JHONATAN Etienne   divalproex sodium (DEPAKOTE) 250 mg EC tablet Take 3 tablets (750 mg total) by mouth every 12 (twelve) hours 7/25/22 9/22/23   Troy Nieves DO   ergocalciferol (ERGOCALCIFEROL) 1.25 MG (99225 UT) capsule         Historical Provider, MD   Fesoterodine Fumarate ER (Toviaz) 4 MG TB24 Take 1 tablet by mouth daily       Historical Provider, MD   fluticasone (FLOVENT HFA) 110 MCG/ACT inhaler Inhale 2 puffs as needed Rinse mouth after use.       Historical Provider, MD   haloperidol decanoate (Haldol Decanoate) 50 mg/mL injection every 30 (thirty) days 8/3/22     Historical Provider, MD   hydrocortisone 2.5 % ointment Apply topically 2 (two) times a day 10/25/22     JHONATAN Martinez   hydrOXYzine HCL (ATARAX) 50 mg tablet Take 1 tablet (50 mg total) by mouth daily at bedtime 9/15/23     Alisa Johnson DO   Lurasidone HCl 60 MG TABS Take 1 tablet (60 mg total) by mouth 2 (two) times a day TAKEN IN THE MORNING AND AT NIGHT-----[place] 1/26/23 9/22/23   JHONATAN Etienne   memantine (NAMENDA) 10 mg tablet         Historical Provider, MD   ondansetron (ZOFRAN-ODT) 4 mg disintegrating tablet Take 1 tablet (4 mg total) by mouth every 6 (six) hours as needed for nausea or vomiting 8/18/23     JHONATAN Etienne   pilocarpine (SALAGEN) 5 mg tablet Take 5 mg by mouth 2 (two) times a day       Historical Provider, MD   venlafaxine [facility]) 37.5 mg tablet Take 37.5 mg by mouth daily 10/19/22     Historical Provider, MD   warfarin (COUMADIN) 6 mg tablet Take 6mg (one tablet) daily every day by mouth.  9/12/23     Marylene No MOMO Anders, DO      I have reviewed home medications using recent Epic encounter.     Allergies: Allergies   Allergen Reactions   • Percocet [Oxycodone-Acetaminophen] Headache       Severe headaches   (denies issues with Tylenol)   • Povidone Iodine Rash       Unsure if betadine or gauze post surgical. Got rash on leg. Has  Had itchy rashes after every surgery prep and IV insertion   • Medical Tape Hives   • Chlorhexidine Rash       Per pt "able to use the liquid soap--thinkd reaction from the sponges or wipes"         Social History:  Marital Status: Single   Occupation:   Patient Pre-hospital Living Situation: Home  Patient Pre-hospital Level of Mobility: walks  Patient Pre-hospital Diet Restrictions:   Substance Use History:   Social History           Substance and Sexual Activity   Alcohol Use Not Currently     Comment: Recovering alcoholic      Social History           Tobacco Use   Smoking Status Former   • Packs/day: 2.00   • Years: 30.00   • Total pack years: 60.00   • Types: Cigarettes   • Start date: 1985   • Quit date: 2018   • Years since quittin.7   Smokeless Tobacco Never   Tobacco Comments     Started at age 14.      Social History           Substance and Sexual Activity   Drug Use Yes   • Types: Marijuana, Methamphetamines     Comment: Trying to get off meth       In meeting with the patient, she admits her intentional overdose was a suicide attempt. She states she has been having intensifying suicidal ideation with plans to overdose over recent weeks. She expresses remorse that her suicide attempt was not successful. She was very vague as to what stressors may have played a role. She indicated that work stress was part of the picture although later stated that she enjoys her work. Past psychiatric history: Patient reports history of bipolar disorder, depression, anxiety, ADHD and rule out of borderline personality disorder.   She is seeing a psychiatrist currently has been scheduled to see her psychiatrist today. Social history: The patient is single with no children. She states she works part-time. She has sided work stressors playing a role in her suicide attempt but later stated that she enjoys her work. She reports no abuse. Family history: The patient believes that her mother and sister had mental health issues. Substance use history: As above. Patient reports she has been clean from methamphetamines since September 2, 2023. She states her psychiatrist had plans to start her on Strattera today to take the place of the methamphetamine in treating her ADHD. Mental status examination: The patient is alert and well oriented in all spheres. Affect is depressed. Speech is unremarkable. At times she appeared to be overwhelmed with emotion and did not respond to questions. During this times it appeared that she might have been responding to internal stimuli although she denied this. Thought process was logical and linear. Thought content was reality based. Associations were tight. Memory appeared to be intact in all spheres. She appears to be of average intelligence by use vocabulary, general fund of knowledge, sentence structure and syntax. She admits to suicidal ideation and suicide attempt and expresses remorse that this suicide attempt was not successful. She appeared to be overwhelmed with sadness when expressing her remorse regarding her saliva. She denies homicidal ideation. She denies hallucinations and other psychotic features. She has demonstrated impaired insight and judgment.     Past Medical History:   Diagnosis Date   • Anxiety    • Arthritis     oa cassandra knees   • Asthma     good control- no medications   • Yan's esophagus    • Bipolar affective disorder ([address])    • Cannabis abuse with unspecified 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[address])    • Chronic pain disorder     lumbar   • COPD (chronic obstructive pulmonary disease) (HCC)    • CPAP (continuous positive airway pressure) dependence    • Degenerative disc disease at L5-S1 level    • Deliberate self-cutting     9/15/22per pt has not done recently-- does see a therapist and psychiatrist regularly   • Depression 09/16/2008   • Disease of thyroid gland     hypo   • MARTINEZ (dyspnea on exertion)     per pt "has had this with exertion and not new"   • Drug overdose 10/28/2008    suicide attempt and again drug overdose 7/2022--[facility] floor and than transferred to [facility]   • Dysphagia    • Dyspnea    • Edema     BLE   • Family history of blood clots    • GERD (gastroesophageal reflux disease)    • Headache(784.0)    • Headache, tension-type    • High blood pressure    • History of COVID-19 12/30/2020    Symptoms started on 12/30/2020 and then got worse. Today she is feeling a little bit better. She is a candidate for monoclonal antibody infusion and set for 1/6/21 @ 1pm at [facility]. 01/07/21 - telemedicine -    • Hyperlipidemia    • Hypertension    • Knee pain, bilateral     Especially right   • Medical marijuana use    • Memory loss    • Migraines    • Obesity    • Overactive bladder    • Sjogren's disease ([address])    • Sleep apnea    • Stress incontinence    • Suicidal ideations    • Teeth missing    • Tremor     per pt Tremors of Right Leg and both Arms   • Visual impairment    • Wears glasses        Medical Review Of Systems:    Review of Systems    Meds/Allergies     all current active meds have been reviewed  Allergies   Allergen Reactions   • Percocet [Oxycodone-Acetaminophen] Headache     Severe headaches   (denies issues with Tylenol)   • Povidone Iodine Rash     Unsure if betadine or gauze post surgical. Got rash on leg.    Has  Had itchy rashes after every surgery prep and IV insertion   • Medical Tape Hives   • Chlorhexidine Rash     Per pt "able to use the liquid soap--thinkd reaction from the sponges or wipes"       Objective     Vital signs in last 24 hours:  Temp:  [97.7 °F (36.5 °C)-98.7 °F (37.1 °C)] 97.8 °F (36.6 °C)  HR:  [71-88] 75  Resp:  [16-22] 16  BP: (105-162)/(57-86) 115/61      Intake/Output Summary (Last 24 hours) at 9/27/2023 1423  Last data filed at 9/27/2023 1324  Gross per 24 hour   Intake 420 ml   Output --   Net 420 ml       Lab Results: I have personally reviewed all pertinent laboratory/tests results. Imaging Studies: XR chest 1 view portable    Result Date: 9/27/2023  Narrative: CHEST INDICATION:   sob. COMPARISON: 9/20/2023 CT EXAM PERFORMED/VIEWS:  XR CHEST PORTABLE Single view FINDINGS: Cardiomediastinal silhouette appears unremarkable. Persistent subsegmental atelectasis left lung base No pneumothorax or pleural effusion. Osseous structures appear within normal limits for patient age. Impression: Persistent subsegmental atelectasis left lung base Workstation performed: [id number]     CTA ED chest PE Study    Result Date: 9/21/2023  Narrative: CTA - CHEST WITH IV CONTRAST - PULMONARY ANGIOGRAM INDICATION:   Pulmonary embolism (PE) suspected, high prob Known PE with SOB, c/p. COMPARISON: CT 9/7/2023 and 9/1/2023. TECHNIQUE: CTA examination of the chest was performed using angiographic technique according to a protocol specifically tailored to evaluate for pulmonary embolism. Multiplanar 2D reformatted images were created from the source data. In addition, coronal 3D MIP postprocessing was performed on the acquisition scanner. Radiation dose length product (DLP) for this visit:  694 mGy-cm . This examination, like all CT scans performed in the [place], was performed utilizing techniques to minimize radiation dose exposure, including the use of iterative reconstruction and automated exposure control. IV Contrast:  85 mL of iohexol (OMNIPAQUE) FINDINGS: PULMONARY ARTERIAL TREE: Evaluation is limited by suboptimal bolus timing. Interval resolution of previously seen saddle embolus and right-sided emboli.  There is decreased thrombus in the distal left main pulmonary artery extending partially into left upper and left lower lobe segmental arteries. No new emboli appreciated. LUNGS: Mild bibasilar subsegmental atelectasis. There is no tracheal or endobronchial lesion. PLEURA:  Unremarkable. HEART/GREAT VESSELS:  Unremarkable for patient's age. No thoracic aortic aneurysm. MEDIASTINUM AND RUEL:  Unremarkable. CHEST WALL AND LOWER NECK:   Unremarkable. VISUALIZED STRUCTURES IN THE UPPER ABDOMEN:  Unremarkable. OSSEOUS STRUCTURES:  No acute fracture or destructive osseous lesion. Impression: Study limited by suboptimal contrast bolus timing. No evidence of acute pulmonary embolus. Interval improvement of previously seen extensive embolic burden, with residual though decreased left-sided emboli. Finding (s) were preliminarily reported by [facility] service at the time of examination. Workstation performed: [id number]     XR chest 2 views    Result Date: 9/15/2023  Narrative: CHEST INDICATION:   Chest Pain. COMPARISON: 09/06/2023 EXAM PERFORMED/VIEWS:  XR CHEST PA & LATERAL The frontal view was performed utilizing dual energy radiographic technique. Images: 4 FINDINGS: Cardiomediastinal silhouette appears unremarkable. The lungs are clear. No pneumothorax or pleural effusion. Osseous structures appear within normal limits for patient age. Impression: No acute cardiopulmonary disease. Workstation performed: [id number]     Echo follow up/limited w/ contrast if indicated    Result Date: 9/7/2023  Narrative: •  Left Ventricle: The left ventricular ejection fraction is 60%. Systolic function is normal. •  Right Ventricle: Right ventricular cavity size is normal. Systolic function is normal. •  Tricuspid Valve: The right ventricular systolic pressure is mildly elevated. The estimated right ventricular systolic pressure is 16.56 mmHg.      XR chest 1 view portable    Result Date: 9/7/2023  Narrative: CHEST INDICATION:   CP, SOB. COMPARISON: 9/1/2023 EXAM PERFORMED/VIEWS:  XR CHEST PORTABLE FINDINGS: Cardiomediastinal silhouette appears unremarkable. The lungs are clear. No pneumothorax or pleural effusion. Osseous structures appear within normal limits for patient age. Impression: No acute cardiopulmonary disease. Workstation performed: [id number]     CTA ED chest PE study    Result Date: 9/7/2023  Narrative: CTA - CHEST WITH IV CONTRAST - PULMONARY ANGIOGRAM INDICATION:   Chest pain. Shortness of breath. Known pulmonary embolism. COMPARISON: CT of the chest on September 1, 2023. TECHNIQUE: CTA examination of the chest was performed using angiographic technique according to a protocol specifically tailored to evaluate for pulmonary embolism. Multiplanar 2D reformatted images were created from the source data. In addition, coronal 3D MIP postprocessing was performed on the acquisition scanner. Radiation dose length product (DLP) for this visit:  751 mGy-cm . This examination, like all CT scans performed in the [facility], was performed utilizing techniques to minimize radiation dose exposure, including the use of iterative reconstruction and automated exposure control. IV Contrast:  85 mL of iohexol (OMNIPAQUE) FINDINGS: PULMONARY ARTERIAL TREE: There is redemonstrated saddle pulmonary embolism within the main pulmonary arteries extending into bilateral lower lobe lobar to segmental branches. Clot burden is not significantly changed since prior exam. LUNGS: Mild atelectatic changes. No focal consolidation. There is no tracheal or endobronchial lesion. PLEURA: Trace left pleural effusion, new since prior exam. HEART/GREAT VESSELS: There is evidence of right heart strain. No pericardial effusion. No thoracic aortic aneurysm. MEDIASTINUM AND RUEL:  Unremarkable. CHEST WALL AND LOWER NECK:   Unremarkable. VISUALIZED STRUCTURES IN THE UPPER ABDOMEN: Status post cholecystectomy.  OSSEOUS STRUCTURES:  No acute fracture or destructive osseous lesion. Impression: Persistent saddle pulmonary embolism extending into the bilateral lower lobar to segmental branches, clot burden is not significantly changed since prior exam. The calculated ratio of right ventricular to left ventricular diameter (RV/LV ratio) is 1.1. There is a critical finding of acute PE with RV/LV ratio >0.9. Based on PERT algorithm recommendations:   Order STAT biomarkers including troponin, NT-BNP or BNP   Calculate PESI score: o  If PESI score falls in I-III, with negative biomarkers, please order a PERT priority ECHO. o  If PESI score falls in IV-V or with positive biomarkers, please order STAT ECHO and alert the campus PERT via [address] at [phone]. The study was marked in [facility] for immediate notification. Workstation performed: [id number]     Echo complete w/ contrast if indicated    Result Date: 9/2/2023  Narrative: •  Left Ventricle: Left ventricular cavity size is normal. Wall thickness is normal. The left ventricular ejection fraction is 60%. Systolic function is normal. Wall motion is normal. Diastolic function is normal. •  Right Ventricle: Right ventricular cavity size is normal. Systolic function is normal. •  Left Atrium: The atrium is mildly dilated. •  Mitral Valve: There is mild regurgitation. •  Tricuspid Valve: There is mild to moderate regurgitation. The estimated right ventricular systolic pressure is 73.72 mmHg. •  Pulmonic Valve: There is mild regurgitation. VAS lower limb venous duplex study, complete bilateral    Result Date: 9/2/2023  Narrative:  [facility] REPORT CLINICAL: Indications: Patient presents with recent discovery of pulmonary embolism and physician wants to determine potential source. Patient reports shortness of breath. Operative History: Patient denies any cardiovascular procedures Risk Factors The patient has history of HTN and previous smoking (quit 1-5yrs ago). She has no history of Diabetes.   FINDINGS:  Segment Left Impression  FV Dist Non Occlusive Acute  Popliteal Non Occlusive Acute  Peroneal Non Occlusive Acute     CONCLUSION: Impression: RIGHT LOWER LIMB: No evidence of acute or chronic deep vein thrombosis. No evidence of superficial thrombophlebitis noted. Doppler evaluation shows a normal response to augmentation maneuvers. . Popliteal, posterior tibial and anterior tibial arterial Doppler waveform's are triphasic. LEFT LOWER LIMB Acute, non occlusive DVT identified in the distal femoral, popliteal and (one) peroneal veins. No evidence of superficial thrombophlebitis noted. Doppler evaluation shows a normal response to augmentation maneuvers. Popliteal, posterior tibial and anterior tibial arterial Doppler waveform's are triphasic. Technical findings were given to Bahman Vang MD via [employer]. SIGNATURE: Electronically Signed by: Lory Riggs MD [facility] Jose Matt on 2023-09-02 12:04:55 PM    XR chest 1 view portable    Result Date: 9/2/2023  Narrative: CHEST INDICATION:   SOB. COMPARISON: 2/10/2023 EXAM PERFORMED/VIEWS:  XR CHEST PORTABLE Single view FINDINGS: Cardiomediastinal silhouette appears unremarkable. The lungs are clear. No pneumothorax or pleural effusion. Osseous structures appear within normal limits for patient age. Impression: No acute cardiopulmonary disease. Findings are stable Workstation performed: [id number]     CTA ED chest PE study    Result Date: 9/1/2023  Narrative: CTA - CHEST WITH IV CONTRAST - PULMONARY ANGIOGRAM INDICATION:   Dyspnea, elevated dimer. COMPARISON: Chest x-ray dated the same TECHNIQUE: CTA examination of the chest was performed using angiographic technique according to a protocol specifically tailored to evaluate for pulmonary embolism. Multiplanar 2D reformatted images were created from the source data. In addition, coronal 3D MIP postprocessing was performed on the acquisition scanner. Radiation dose length product (DLP) for this visit:  745 mGy-cm .   This examination, like all CT scans performed in the [facility], was performed utilizing techniques to minimize radiation dose exposure, including the use of iterative reconstruction and automated exposure control. IV Contrast:  75 mL of iohexol (OMNIPAQUE) FINDINGS: PULMONARY ARTERIAL TREE: Large saddle embolus is seen which extends into the right and left lower lobar and several bilateral lower lobe segmental pulmonary arterial branches. LUNGS: Lungs appear grossly clear. PLEURA:  Unremarkable. HEART/GREAT VESSELS: There is evidence of right heart strain. Mild atherosclerosis; no aortic aneurysm. MEDIASTINUM AND RUEL:  Unremarkable. CHEST WALL AND LOWER NECK:   Unremarkable. VISUALIZED STRUCTURES IN THE UPPER ABDOMEN: Status post cholecystectomy. Subtle lobulated contour of the liver may suggest a component of hepatic cirrhosis. OSSEOUS STRUCTURES:  No acute fracture or destructive osseous lesion. Impression: Large saddle embolus is seen which extends into the right and left lower lobar and several bilateral lower lobe segmental pulmonary arterial branches. There is associated evidence of right heart strain. Subtle lobulated contour of the liver may suggest a component of hepatic cirrhosis. Other findings as above. I personally discussed this study with Dr. Lilli Jean-Baptiste in the [facility] ED on 9/1/2023 11:28 PM. Workstation performed: [id number]     XR toe right great min 2 view    Result Date: 8/31/2023  Narrative: 3 view right second toe Indication: Nontraumatic right second toe pain, swelling, and redness. The views of the right second toe do not reveal a fracture or dislocation. Cortex of the bones appear intact with no radiographic evidence for osteomyelitis. Patient has a mild hallux valgus deformity. Impression: Impression: Unremarkable three-view right second toe.  If there is concern for osteomyelitis other imaging study such as MRI is recommended as MRI would be more sensitive and specific compared to plain films. Workstation:[id number]    EKG/Pathology/Other Studies:   Lab Results   Component Value Date    VENTRATE 80 09/26/2023    ATRIALRATE 80 09/26/2023    PRINT 174 09/26/2023    QRSDINT 86 09/26/2023    QTINT 358 09/26/2023    QTCINT 412 09/26/2023    PAXIS 47 09/26/2023    QRSAXIS 14 09/26/2023    TWAVEAXIS 56 09/26/2023        Code Status: Level 1 - Full Code  Advance Directive and Living Will:      Power of :    POLST:      Screenings:    1. Nutrition Screening  · Not available on chart    2. Pain Screening  Not available on chart    3. Suicide Screening  Not available on chart    Counseling / Coordination of Care: Total floor / unit time spent today 30 minutes. Greater than 50% of total time was spent with the patient and / or family counseling and / or coordination of care. A description of the counseling / coordination of care: Chart review, patient evaluation, coordination communication with staff, nursing and provider.

## 2023-09-27 NOTE — ED NOTES
Pt continually getting out of bed. Pt is unsteady when standing. Pt unable to be redirected back to bed by this RN and tech. Security was called and pt sat in bed allowing 10mg Zyprexa given IM per Dr. Doris Paul. Pt resting in bed, no outward signs of distress.       Debora Morales RN  09/27/23 0117

## 2023-09-28 ENCOUNTER — HOSPITAL ENCOUNTER (INPATIENT)
Facility: HOSPITAL | Age: 53
LOS: 8 days | Discharge: HOME | End: 2023-10-06
Attending: STUDENT IN AN ORGANIZED HEALTH CARE EDUCATION/TRAINING PROGRAM | Admitting: STUDENT IN AN ORGANIZED HEALTH CARE EDUCATION/TRAINING PROGRAM
Payer: MEDICARE

## 2023-09-28 ENCOUNTER — TELEPHONE (OUTPATIENT)
Dept: HEMATOLOGY ONCOLOGY | Facility: CLINIC | Age: 53
End: 2023-09-28

## 2023-09-28 VITALS
HEART RATE: 77 BPM | DIASTOLIC BLOOD PRESSURE: 77 MMHG | RESPIRATION RATE: 18 BRPM | HEIGHT: 66 IN | WEIGHT: 293 LBS | SYSTOLIC BLOOD PRESSURE: 139 MMHG | OXYGEN SATURATION: 95 % | TEMPERATURE: 97.7 F | BODY MASS INDEX: 47.09 KG/M2

## 2023-09-28 DIAGNOSIS — E03.9 ACQUIRED HYPOTHYROIDISM: Chronic | ICD-10-CM

## 2023-09-28 DIAGNOSIS — G89.29 CHRONIC LOW BACK PAIN: ICD-10-CM

## 2023-09-28 DIAGNOSIS — F25.0 SCHIZOAFFECTIVE DISORDER, BIPOLAR TYPE (HCC): Primary | Chronic | ICD-10-CM

## 2023-09-28 DIAGNOSIS — E66.01 MORBID OBESITY WITH BMI OF 45.0-49.9, ADULT (HCC): ICD-10-CM

## 2023-09-28 DIAGNOSIS — N32.81 OVERACTIVE BLADDER: Chronic | ICD-10-CM

## 2023-09-28 DIAGNOSIS — R41.3 MEMORY DIFFICULTIES: ICD-10-CM

## 2023-09-28 DIAGNOSIS — K21.9 GERD (GASTROESOPHAGEAL REFLUX DISEASE): ICD-10-CM

## 2023-09-28 DIAGNOSIS — M54.50 CHRONIC LOW BACK PAIN: ICD-10-CM

## 2023-09-28 DIAGNOSIS — Z86.711 HISTORY OF PULMONARY EMBOLISM: ICD-10-CM

## 2023-09-28 DIAGNOSIS — E55.9 VITAMIN D DEFICIENCY, UNSPECIFIED: ICD-10-CM

## 2023-09-28 DIAGNOSIS — I10 HYPERTENSION: ICD-10-CM

## 2023-09-28 DIAGNOSIS — Z00.8 MEDICAL CLEARANCE FOR PSYCHIATRIC ADMISSION: ICD-10-CM

## 2023-09-28 LAB
INR PPP: 2.35 (ref 0.84–1.19)
PROTHROMBIN TIME: 26.6 SECONDS (ref 11.6–14.5)

## 2023-09-28 PROCEDURE — 85610 PROTHROMBIN TIME: CPT | Performed by: INTERNAL MEDICINE

## 2023-09-28 PROCEDURE — NC001 PR NO CHARGE: Performed by: INTERNAL MEDICINE

## 2023-09-28 PROCEDURE — 99239 HOSP IP/OBS DSCHRG MGMT >30: CPT | Performed by: INTERNAL MEDICINE

## 2023-09-28 RX ORDER — TOPIRAMATE 100 MG/1
200 TABLET, FILM COATED ORAL 2 TIMES DAILY
Status: CANCELLED | OUTPATIENT
Start: 2023-09-28

## 2023-09-28 RX ORDER — IBUPROFEN 400 MG/1
400 TABLET ORAL EVERY 6 HOURS PRN
Status: DISCONTINUED | OUTPATIENT
Start: 2023-09-28 | End: 2023-10-06 | Stop reason: HOSPADM

## 2023-09-28 RX ORDER — IBUPROFEN 600 MG/1
600 TABLET ORAL EVERY 8 HOURS PRN
Status: DISCONTINUED | OUTPATIENT
Start: 2023-09-28 | End: 2023-10-06 | Stop reason: HOSPADM

## 2023-09-28 RX ORDER — AMOXICILLIN 250 MG
1 CAPSULE ORAL DAILY PRN
Status: CANCELLED | OUTPATIENT
Start: 2023-09-28

## 2023-09-28 RX ORDER — OLANZAPINE 5 MG/1
5 TABLET ORAL
Status: DISCONTINUED | OUTPATIENT
Start: 2023-09-28 | End: 2023-10-06 | Stop reason: HOSPADM

## 2023-09-28 RX ORDER — LORAZEPAM 1 MG/1
1 TABLET ORAL
Status: CANCELLED | OUTPATIENT
Start: 2023-09-28

## 2023-09-28 RX ORDER — OLANZAPINE 2.5 MG/1
2.5 TABLET ORAL
Status: DISCONTINUED | OUTPATIENT
Start: 2023-09-28 | End: 2023-10-06 | Stop reason: HOSPADM

## 2023-09-28 RX ORDER — OLANZAPINE 2.5 MG/1
2.5 TABLET ORAL
Status: CANCELLED | OUTPATIENT
Start: 2023-09-28

## 2023-09-28 RX ORDER — HYDROXYZINE HYDROCHLORIDE 25 MG/1
25 TABLET, FILM COATED ORAL
Status: DISCONTINUED | OUTPATIENT
Start: 2023-09-28 | End: 2023-10-06 | Stop reason: HOSPADM

## 2023-09-28 RX ORDER — HYDROXYZINE 50 MG/1
50 TABLET, FILM COATED ORAL
Status: CANCELLED | OUTPATIENT
Start: 2023-09-28

## 2023-09-28 RX ORDER — HALOPERIDOL DECANOATE 50 MG/ML
50 INJECTION INTRAMUSCULAR ONCE
Status: CANCELLED | OUTPATIENT
Start: 2023-09-28 | End: 2023-09-28

## 2023-09-28 RX ORDER — WARFARIN SODIUM 4 MG/1
4 TABLET ORAL
Status: DISCONTINUED | OUTPATIENT
Start: 2023-09-28 | End: 2023-09-28

## 2023-09-28 RX ORDER — TRAZODONE HYDROCHLORIDE 50 MG/1
50 TABLET ORAL
Status: DISCONTINUED | OUTPATIENT
Start: 2023-09-28 | End: 2023-10-06 | Stop reason: HOSPADM

## 2023-09-28 RX ORDER — POLYETHYLENE GLYCOL 3350 17 G/17G
17 POWDER, FOR SOLUTION ORAL DAILY PRN
Status: DISCONTINUED | OUTPATIENT
Start: 2023-09-28 | End: 2023-10-06 | Stop reason: HOSPADM

## 2023-09-28 RX ORDER — BUPROPION HYDROCHLORIDE 150 MG/1
300 TABLET ORAL DAILY
Status: CANCELLED | OUTPATIENT
Start: 2023-09-29

## 2023-09-28 RX ORDER — OLANZAPINE 10 MG/1
5 INJECTION, POWDER, LYOPHILIZED, FOR SOLUTION INTRAMUSCULAR
Status: CANCELLED | OUTPATIENT
Start: 2023-09-28

## 2023-09-28 RX ORDER — WATER 1000 ML/1000ML
INJECTION, SOLUTION INTRAVENOUS
Status: COMPLETED
Start: 2023-09-28 | End: 2023-09-28

## 2023-09-28 RX ORDER — HYDROXYZINE 50 MG/1
50 TABLET, FILM COATED ORAL
Status: DISCONTINUED | OUTPATIENT
Start: 2023-09-28 | End: 2023-10-06 | Stop reason: HOSPADM

## 2023-09-28 RX ORDER — BUPROPION HYDROCHLORIDE 300 MG/1
300 TABLET ORAL DAILY
Status: DISCONTINUED | OUTPATIENT
Start: 2023-09-29 | End: 2023-10-06 | Stop reason: HOSPADM

## 2023-09-28 RX ORDER — OLANZAPINE 2.5 MG/1
5 TABLET ORAL
Status: CANCELLED | OUTPATIENT
Start: 2023-09-28

## 2023-09-28 RX ORDER — POLYETHYLENE GLYCOL 3350 17 G/17G
17 POWDER, FOR SOLUTION ORAL DAILY PRN
Status: CANCELLED | OUTPATIENT
Start: 2023-09-28

## 2023-09-28 RX ORDER — TOPIRAMATE 100 MG/1
200 TABLET, FILM COATED ORAL 2 TIMES DAILY
Status: DISCONTINUED | OUTPATIENT
Start: 2023-09-29 | End: 2023-10-06 | Stop reason: HOSPADM

## 2023-09-28 RX ORDER — MELATONIN
1000 DAILY
Status: CANCELLED | OUTPATIENT
Start: 2023-09-29

## 2023-09-28 RX ORDER — TRAZODONE HYDROCHLORIDE 50 MG/1
50 TABLET ORAL
Status: CANCELLED | OUTPATIENT
Start: 2023-09-28

## 2023-09-28 RX ORDER — HALOPERIDOL DECANOATE 50 MG/ML
50 INJECTION INTRAMUSCULAR ONCE
Status: DISCONTINUED | OUTPATIENT
Start: 2023-09-28 | End: 2023-09-29

## 2023-09-28 RX ORDER — WARFARIN SODIUM 6 MG/1
6 TABLET ORAL
Status: DISCONTINUED | OUTPATIENT
Start: 2023-09-28 | End: 2023-09-28 | Stop reason: HOSPADM

## 2023-09-28 RX ORDER — LORAZEPAM 1 MG/1
1 TABLET ORAL
Status: DISCONTINUED | OUTPATIENT
Start: 2023-09-28 | End: 2023-09-29

## 2023-09-28 RX ORDER — LORAZEPAM 2 MG/ML
1 INJECTION INTRAMUSCULAR
Status: CANCELLED | OUTPATIENT
Start: 2023-09-28

## 2023-09-28 RX ORDER — IBUPROFEN 200 MG
200 TABLET ORAL EVERY 6 HOURS PRN
Status: CANCELLED | OUTPATIENT
Start: 2023-09-28

## 2023-09-28 RX ORDER — IBUPROFEN 600 MG/1
600 TABLET ORAL EVERY 8 HOURS PRN
Status: CANCELLED | OUTPATIENT
Start: 2023-09-28

## 2023-09-28 RX ORDER — IBUPROFEN 400 MG/1
400 TABLET ORAL EVERY 6 HOURS PRN
Status: CANCELLED | OUTPATIENT
Start: 2023-09-28

## 2023-09-28 RX ORDER — IBUPROFEN 400 MG/1
200 TABLET ORAL EVERY 6 HOURS PRN
Status: DISCONTINUED | OUTPATIENT
Start: 2023-09-28 | End: 2023-10-06 | Stop reason: HOSPADM

## 2023-09-28 RX ORDER — OLANZAPINE 10 MG/1
5 INJECTION, POWDER, LYOPHILIZED, FOR SOLUTION INTRAMUSCULAR
Status: DISCONTINUED | OUTPATIENT
Start: 2023-09-28 | End: 2023-10-06 | Stop reason: HOSPADM

## 2023-09-28 RX ORDER — LORAZEPAM 2 MG/ML
1 INJECTION INTRAMUSCULAR
Status: DISCONTINUED | OUTPATIENT
Start: 2023-09-28 | End: 2023-10-06 | Stop reason: HOSPADM

## 2023-09-28 RX ORDER — MELATONIN
1000 DAILY
Status: DISCONTINUED | OUTPATIENT
Start: 2023-09-29 | End: 2023-10-06 | Stop reason: HOSPADM

## 2023-09-28 RX ORDER — HYDROXYZINE HYDROCHLORIDE 25 MG/1
25 TABLET, FILM COATED ORAL
Status: CANCELLED | OUTPATIENT
Start: 2023-09-28

## 2023-09-28 RX ORDER — AMOXICILLIN 250 MG
1 CAPSULE ORAL DAILY PRN
Status: DISCONTINUED | OUTPATIENT
Start: 2023-09-28 | End: 2023-10-06 | Stop reason: HOSPADM

## 2023-09-28 RX ADMIN — BUPROPION HYDROCHLORIDE 300 MG: 150 TABLET, FILM COATED, EXTENDED RELEASE ORAL at 08:34

## 2023-09-28 RX ADMIN — TOPIRAMATE 200 MG: 100 TABLET, FILM COATED ORAL at 08:34

## 2023-09-28 RX ADMIN — OXYBUTYNIN CHLORIDE 5 MG: 5 TABLET ORAL at 08:34

## 2023-09-28 RX ADMIN — TOPIRAMATE 200 MG: 100 TABLET, FILM COATED ORAL at 17:45

## 2023-09-28 RX ADMIN — LEVOTHYROXINE SODIUM 125 MCG: 125 TABLET ORAL at 06:11

## 2023-09-28 RX ADMIN — DIVALPROEX SODIUM 750 MG: 250 TABLET, DELAYED RELEASE ORAL at 09:37

## 2023-09-28 RX ADMIN — WARFARIN SODIUM 6 MG: 6 TABLET ORAL at 17:45

## 2023-09-28 RX ADMIN — OXYBUTYNIN CHLORIDE 5 MG: 5 TABLET ORAL at 17:45

## 2023-09-28 RX ADMIN — FLUTICASONE FUROATE 1 PUFF: 100 POWDER RESPIRATORY (INHALATION) at 08:38

## 2023-09-28 RX ADMIN — DIVALPROEX SODIUM 750 MG: 250 TABLET, DELAYED RELEASE ORAL at 21:10

## 2023-09-28 RX ADMIN — AMLODIPINE BESYLATE 5 MG: 5 TABLET ORAL at 08:34

## 2023-09-28 RX ADMIN — ESTRADIOL 0.5 MG: 1 TABLET ORAL at 08:34

## 2023-09-28 RX ADMIN — PANTOPRAZOLE SODIUM 20 MG: 20 TABLET, DELAYED RELEASE ORAL at 17:45

## 2023-09-28 RX ADMIN — FUROSEMIDE 20 MG: 20 TABLET ORAL at 08:34

## 2023-09-28 RX ADMIN — ZIPRASIDONE MESYLATE 20 MG: 20 INJECTION, POWDER, LYOPHILIZED, FOR SOLUTION INTRAMUSCULAR at 13:16

## 2023-09-28 RX ADMIN — PANTOPRAZOLE SODIUM 20 MG: 20 TABLET, DELAYED RELEASE ORAL at 06:11

## 2023-09-28 RX ADMIN — WATER 10 ML: 1 INJECTION INTRAMUSCULAR; INTRAVENOUS; SUBCUTANEOUS at 13:16

## 2023-09-28 NOTE — PROGRESS NOTES
[address] [facility]  Progress Note  Name: Bel Talbert  MRN: [id number]  Unit/Bed#: S -01 I Date of Admission: 2023   Date of Service: 2023 I Hospital Day: 1    Assessment/Plan   * Intentional overdose [facility])  Assessment & Plan  · Past medical history of prior suicide attempts. Presented with suicide attempt by taking 25 pills of Flexeril 10 mg pills. Per ED note, patient called EMS herself and wanted to voluntarily commit herself to inpatient psych facility. ·  patient signed a 201. Did attempt to flee later in the day so control team called. Plan:  · Patient medically stable for transfer to inpatient psych facility. History of pulmonary embolus (PE)  Assessment & Plan  Lab Results   Component Value Date    INR 2.35 (H) 2023    INR 4.36 (H) 2023   Home warfarin dose 6mg daily    Plan:  · Resume warfarin at 6mg daily. Major depressive disorder  Assessment & Plan  · Will follow up to resume home medications. Plan:  · Consult psychology, appreciate recommendations    Hypothyroidism  Assessment & Plan  · Continue home levothyroxine 125 mcg. VTE Pharmacologic Prophylaxis:   High Risk (Score >/= 5) - Pharmacological DVT Prophylaxis Ordered: warfarin (Coumadin). Sequential Compression Devices Ordered. Patient Centered Rounds: I performed bedside rounds with nursing staff today. Discussions with Specialists or Other Care Team Provider:     Current Length of Stay: 1 day(s)  Current Patient Status: Inpatient   Discharge Plan: Anticipate discharge later today or tomorrow to inpatient psych. Code Status: Level 1 - Full Code    Subjective:   Patient sitting in bedside chair today. Says she is feeling better and in a better mood today.     Objective:     Vitals:   Temp (24hrs), Av.2 °F (36.8 °C), Min:97.8 °F (36.6 °C), Max:98.7 °F (37.1 °C)    Temp:  [97.8 °F (36.6 °C)-98.7 °F (37.1 °C)] 97.8 °F (36.6 °C)  HR:  [76-87] 76  Resp:  [17-22] 20  BP: (124-140)/(67-68) 140/67  SpO2:  [92 %-98 %] 98 %  Body mass index is 48.42 kg/m². Input and Output Summary (last 24 hours):   No intake or output data in the 24 hours ending 09/28/23 1336    Physical Exam:   Physical Exam  Constitutional:       Appearance: She is obese. She is not ill-appearing. Cardiovascular:      Rate and Rhythm: Normal rate and regular rhythm. Heart sounds: No murmur heard. Pulmonary:      Effort: Pulmonary effort is normal. No respiratory distress. Breath sounds: Normal breath sounds. Neurological:      Mental Status: She is alert and oriented to person, place, and time. Psychiatric:      Comments: Appears anxious          Additional Data:     Labs:  Results from last 7 days   Lab Units 09/26/23  2338   WBC Thousand/uL 10.04   HEMOGLOBIN g/dL 13.1   HEMATOCRIT % 42.1   PLATELETS Thousands/uL 159   NEUTROS PCT % 59   LYMPHS PCT % 28   MONOS PCT % 10   EOS PCT % 1     Results from last 7 days   Lab Units 09/26/23  2338   SODIUM mmol/L 139   POTASSIUM mmol/L 3.9   CHLORIDE mmol/L 109*   CO2 mmol/L 21   BUN mg/dL 15   CREATININE mg/dL 0.64   ANION GAP mmol/L 9   CALCIUM mg/dL 8.4   ALBUMIN g/dL 3.5   TOTAL BILIRUBIN mg/dL 0.19*   ALK PHOS U/L 45   ALT U/L 15   AST U/L 14   GLUCOSE RANDOM mg/dL 102     Results from last 7 days   Lab Units 09/28/23  0552   INR  2.35*                   Lines/Drains:  Invasive Devices     None                       Imaging: No pertinent imaging reviewed.     Recent Cultures (last 7 days):         Last 24 Hours Medication List:   Current Facility-Administered Medications   Medication Dose Route Frequency Provider Last Rate   • albuterol  2 puff Inhalation Q6H PRN Basanta Shayne, DO     • amLODIPine  5 mg Oral Daily Basanta Shayne, DO     • buPROPion  300 mg Oral Daily Basanta Shayne, DO     • divalproex sodium  750 mg Oral Q12H [facility] [place] Donnia Lanes, DO     • estradiol  0.5 mg Oral Daily Basanta Shayne, DO     • fluticasone  1 puff Inhalation Daily Basanta Shayne, DO     • furosemide  20 mg Oral Daily Basanta Shayne, DO     • levothyroxine  125 mcg Oral Early Morning Basanta Shayne, DO     • ondansetron  4 mg Oral Q6H PRN Basanta Shayne, DO     • oxybutynin  5 mg Oral BID Basanta Shayne, DO     • pantoprazole  20 mg Oral BID AC Basanta Shayne, DO     • topiramate  200 mg Oral BID Basanta Shayne, DO     • warfarin  6 mg Oral Daily (warfarin) John Rivers DO     • ziprasidone  20 mg Intramuscular Q4H PRN Rosales Luciano MD          Today, Patient Was Seen By: John Rivers DO    **Please Note: This note may have been constructed using a voice recognition system. **

## 2023-09-28 NOTE — NURSING NOTE
Control team called at approximately noon due to patient agitated and attempting to leave hospital. 1:1 at bedside noted pt to be throwing items while in her room before going into hallway. She went down stairwell to first floor. Once there she was effectively redirected, seated in wheelchair, and escorted back to pt room. Restraints were nearby but not applied as pt become some what more cooperative and they were not needed at that time. IM Geodon given per order once in pt room.

## 2023-09-28 NOTE — PROGRESS NOTES
Consultation - [place] 47 y.o. female MRN: [id number]  Unit/Bed#: S -01 Encounter: [id number]      Chief Complaint: "I attempted to overdose and end my life"    History of Present Illness   Physician Requesting Consult: Shilpa Connolly MD  Reason for Consult / Principal Problem: Psychological Evaluation    Brittany Obando is a 47 y.o. female with past medical history of bipolar disorder, schizoaffective disorder who presented to the ED after a suicide attempt. Initially signed a 201 commitment for voluntary treatment. Today, patient stated that she no longer wants to seek inpatient treatment and intends to sign 72-hour notice on admission to psychiatric unit. Psych consulted for potential two-doc 302. Per Initial Psychiatric Consult  "Patient is a 47 y.o. female who has presented to the hospital where the resident hospitalist has documented the following in the H&P:  Humberto Oconnell a 47 y.o. female with a PMH of prior suicide attempts via cutting wrist,methamphetamine abuse, depression, anxiety, schizoaffective disorder, hypothyroidism, MAG on BiPAP, COPD, prior PE on warfarin who presents with suicide attempt by taking 25 of 10 mg flexeril pills. Per ED note, positive for suicidal ideation with plan. No homicidal ideation or plan. Patient wanted to voluntarily commit herself to inpatient psych facility. She also endorsed to nausea, weakness and shortness of breath. She also reported feeling depressed and anxious surrounding her medical problems and increased stress about her new job. She lives alone in [facility] and sees a therapist 3 times a week as a psychiatrist. Susan Record to recreational marijuana use. GCS 15 at the ED."    On examination, patient states that she would like discharge soon after admission to a behavioral health unit. She already signed a 201.  She states that she attempted suicide in the context of worsening stress related to a saddle pulmonary embolism that she was diagnosed with and medically managed earlier this month. She states that she had been considering ending her life for several days prior to her attempt. She had a wellness check after missing a group therapy appointment earlier in the week, and admits to lying to the person doing the wellness check that she was feeling okay and did not have SI. She shortly afterwards overdosed on her home flexeril medication. She took herself to the emergency department shortly after the overdose as she was remorseful of her behavior. She states that she wants to live to help take care of her dad and cat. She would like discharge home now as she feels better. Notes several outpatient follow up appointments for for mental health as well as medical follow up related to saddle PE. Upon further discussion, patient agrees to reschedule outpatient medical follow up and continue for 201 admission so that she can have her medication managed quickly.  She notes that she would like her Depakote discontinued and Strattera initiated as per recommendation by her outpatient psychiatrist.      Psychiatric Review Of Systems:  sleep: no  appetite changes: no  weight changes: no  energy/anergy: no  interest/pleasure/anhedonia: no  somatic symptoms: no  anxiety/panic: yes  geovany: no  guilty/hopeless: no  self injurious behavior/risky behavior: yes    Historical Information   Past Psychiatric History:   Multiple inpatient hospitalizations, most recently one year ago  Currently in treatment with ACT team psychiatrist.  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 Other Pertinent History: None    Traumatic History:   Abuse: Defer  Other Traumatic Events: Defer    Past Medical History:   Diagnosis Date   • Anxiety    • Arthritis     oa cassandra knees   • Asthma     good control- no medications   • Yan's esophagus    • Bipolar affective disorder ([address])    • Cannabis abuse with unspecified 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[address])    • Chronic pain disorder     lumbar   • COPD (chronic obstructive pulmonary disease) (HCC)    • CPAP (continuous positive airway pressure) dependence    • Degenerative disc disease at L5-S1 level    • Deliberate self-cutting     9/15/22per pt has not done recently-- does see a therapist and psychiatrist regularly   • Depression 09/16/2008   • Disease of thyroid gland     hypo   • MARTINEZ (dyspnea on exertion)     per pt "has had this with exertion and not new"   • Drug overdose 10/28/2008    suicide attempt and again drug overdose 7/2022--[facility] floor and than transferred to [facility]   • Dysphagia    • Dyspnea    • Edema     BLE   • Family history of blood clots    • GERD (gastroesophageal reflux disease)    • Headache(784.0)    • Headache, tension-type    • High blood pressure    • History of COVID-19 12/30/2020    Symptoms started on 12/30/2020 and then got worse. Today she is feeling a little bit better.   She is a candidate for monoclonal antibody infusion and set for 1/6/21 @ 1pm at [facility]. 01/07/21 - telemedicine -    • Hyperlipidemia    • Hypertension    • Knee pain, bilateral     Especially right   • Medical marijuana use    • Memory loss    • Migraines    • Obesity    • Overactive bladder    • Sjogren's disease ([address])    • Sleep apnea    • Stress incontinence    • Suicidal ideations    • Teeth missing    • Tremor     per pt Tremors of Right Leg and both Arms   • Visual impairment    • Wears glasses        Medical Review Of Systems:  Review of Systems - Negative except anxiety, depression    Meds/Allergies   all current active meds have been reviewed  Allergies   Allergen Reactions   • Percocet [Oxycodone-Acetaminophen] Headache     Severe headaches   (denies issues with Tylenol)   • Povidone Iodine Rash     Unsure if betadine or gauze post surgical. Got rash on leg.    Has  Had itchy rashes after every surgery prep and IV insertion   • Medical Tape Hives   • Chlorhexidine Rash     Per pt "able to use the liquid soap--thinkd reaction from the sponges or wipes"       Objective   Vital signs in last 24 hours:  Temp:  [97.7 °F (36.5 °C)-98.2 °F (36.8 °C)] 97.7 °F (36.5 °C)  HR:  [76-80] 77  Resp:  [18-22] 18  BP: (124-140)/(67-77) 139/77    No intake or output data in the 24 hours ending 09/28/23 1956    Mental Status Evaluation:  Appearance:  appears older than stated age, disheveled and overweight    Behavior:  calm and cooperative   Speech:  normal rate and normal volume   Mood:  anxious   Affect:  Labile   Language: naming objects and repeating phrases   Thought Process:  goal directed and organized   Associations: intact associations   Thought Content:  no delusions elicited   Perceptual Disturbances: Denies auditory or visual hallucinations   Risk Potential: Suicidal Ideations none, Homicidal Ideations none and Potential for Aggression No   Sensorium:  person, place, time/date and situation   Memory:  recent and remote memory grossly intact   Cognition:  recent and remote memory grossly intact   Consciousness:  alert and awake    Attention: attention span and concentration were age appropriate   Intellect: within normal limits   Fund of Knowledge: awareness of current events: fair, past history: fair and vocabulary: fair   Insight:  limited   Judgment: limited   Muscle Strength and Tone: face, neck and torso   Gait/Station: normal gait/station   Motor Activity: no abnormal movements     Lab Results:  I have personally reviewed all pertinent laboratory/tests results. Code Status: )Level 1 - Full Code    Assessment/Plan     Diagnosis:  Bipolar I Disorder    Plan:   • Continue medical management per primary team.  • Continue psychotropic medication as scheduled. • Patient willing to continue on 201 commitment and complete inpatient treatment. No need for 302 at this time. • Reconsult psychiatry with questions. Will sign off at this time. Risks, benefits and possible side effects of Medications:   Risks, benefits, and possible side effects of medications explained to patient and patient verbalizes understanding.         Ledy Jacome MD  09/28/23

## 2023-09-28 NOTE — CASE MANAGEMENT
Case Management Discharge Planning Note    Patient name Bushra Pelayo S /S -01 MRN [id number]  : 1970 Date 2023       Current Admission Date: 2023  Current Admission Diagnosis:Intentional overdose [facility])   Patient Active Problem List    Diagnosis Date Noted   • Intentional overdose ([address]) 2023   • Tremors of nervous system 2023   • Contact dermatitis and eczema 09/15/2023   • History of pulmonary embolus (PE) 2023   • Elevated troponin 2023   • Acute respiratory failure ([address]) 2023   • Cervicalgia 08/10/2023   • Medial epicondylitis of right elbow 2023   • Bilateral leg edema 2023   • Chronic migraine without aura without status migrainosus, not intractable 2023   • Chronic eczematous otitis externa of both ears 2023   • Intentional self-harm by unspecified sharp object, sequela ([address]) 2023   • Suicidal deliberate poisoning ([address]) 2022   • Knee pain, right 2022   • Platelets decreased ([address]) 2022   • GERD (gastroesophageal reflux disease) 2022   • Diarrhea 2022   • Ecchymosis 2022   • Anxiety 2022   • Borderline personality disorder ([address]) 2022   • Contusion of elbow 2021   • Cognitive impairment 10/07/2021   • Substance abuse ([address]) 2021   • Potential for cognitive impairment 2021   • Mood disorder ([address]) 2021   • Chronic tension-type headache, intractable 03/10/2021   • Morbid obesity with BMI of 40.0-44.9, adult ([address]) 2021   • History of COVID-19 2020   • Memory difficulties 2020   • BMI 45.0-49.9, adult ([address]) 2020   • Mixed hyperlipidemia 10/30/2019   • COPD (chronic obstructive pulmonary disease) ([address]) 2019   • Major depressive disorder 2019   • Sjogren's syndrome ([address]) 2019   • Primary osteoarthritis of both knees 2018   • MAG (obstructive sleep apnea) 2018   • Medial epicondylitis of elbow, left 04/03/2018   • Hemorrhage of rectum and anus 10/02/2017   • Overactive bladder 06/08/2017   • Schizoaffective disorder, bipolar type ([address]) 03/17/2017   • Hypothyroidism 03/17/2017   • Restrictive lung disease 02/01/2017   • Family history of renal cancer 04/26/2016   • Microhematuria 12/23/2015   • Yan's esophagus determined by biopsy 10/19/2015   • Medical clearance for psychiatric admission 09/14/2015   • DDD (degenerative disc disease), lumbar 04/09/2015   • Spondylosis of lumbosacral joint without myelopathy 04/09/2015   • Urinary incontinence 03/31/2015   • Benign essential hypertension 07/02/2014   • Edema 03/26/2014   • Chronic low back pain 04/07/2012   • Hypertension 10/25/2008      LOS (days): 1  Geometric Mean LOS (GMLOS) (days): 2.40  Days to GMLOS:1.3     OBJECTIVE:  Risk of Unplanned Readmission Score: 42.4         Current admission status: Inpatient   Preferred Pharmacy:   [address] [place] [address] [place] [address]  Phone: [phone] Fax: [phone]    Primary Care Provider: JHONATAN Durham    Primary Insurance: MEDICARE  Secondary Insurance: [facility]    DISCHARGE DETAILS:    Discharge planning discussed with[de-identified] Patient  Freedom of Choice: Yes  Comments - Freedom of Choice: CM informed patient she was accepted at THE [facility] unit and accepted bed offer. CM contacted family/caregiver?: Yes  Were Treatment Team discharge recommendations reviewed with patient/caregiver?: Yes  Did patient/caregiver verbalize understanding of patient care needs?: N/A- going to facility  Were patient/caregiver advised of the risks associated with not following Treatment Team discharge recommendations?: Yes    Contacts  Patient Contacts: Patient  Relationship to Patient[de-identified] Other (Comment) (Self)  Contact Method:  In Person  Reason/Outcome: Discharge [facility]         Is the patient interested in [facility] at discharge?: No    DME Referral Provided  Referral made for DME?: No    Other Referral/Resources/Interventions Provided:  Interventions: Psychiatrist  Referral Comments: [place]    Would you like to participate in our [address] service program?  : No - Declined    Treatment Team Recommendation: Inpatient Behavioral Health  Discharge Destination Plan[de-identified] Inpatient Behavioral Health  Transport at Discharge : BLS Ambulance     Number/Name of Dispatcher: [employer]     ETA of Transport (Date): 09/28/23  ETA of Transport (Time): 4749     Transfer Mode: [employer] Name, [address] [facility] [address] : [address]  Receiving Facility/Agency Phone Number: [phone]

## 2023-09-28 NOTE — DISCHARGE SUMMARY
[address] [facility]  Discharge- Cristopher Matthews 1970, 47 y.o. female MRN: [id number]  Unit/Bed#: S -01 Encounter: [id number]  Primary Care Provider: JHONATAN Hollingsworth   Date and time admitted to hospital: 9/26/2023 10:31 PM    * Intentional overdose [facility])  Assessment & Plan  · Past medical history of prior suicide attempts. Presented with suicide attempt by taking 25 pills of Flexeril 10 mg pills. Per ED note, patient called EMS herself and wanted to voluntarily commit herself to inpatient psych facility. · 9/27 patient signed a 201. Did attempt to flee later in the day so control team called. Plan:  · Patient medically stable for transfer to inpatient psych facility. Will be transferred to [facility]      History of pulmonary embolus (PE)  Assessment & Plan  Lab Results   Component Value Date    INR 2.35 (H) 09/28/2023    INR 4.36 (H) 09/27/2023   Home warfarin dose 6mg daily    Plan:  · Resume warfarin at 6mg daily. Major depressive disorder  Assessment & Plan  · Will follow up to resume home medications. Plan:  · Consult psychology, appreciate recommendations    Hypothyroidism  Assessment & Plan  · Continue home levothyroxine 125 mcg.       Medical Problems     Resolved Problems  Date Reviewed: 9/28/2023   None       Discharging Resident: William Graham DO  Discharging Attending: Lieutenant Rush MD  PCP: Parish Paige, [address] [place] [address]  Admission Date:   Admission Orders (From admission, onward)     Ordered        09/27/23 1337  Inpatient Admission  Once            09/27/23 0304  Place in Observation  Once            Signed and Held  ED TO DIFFERENT CAMPUS [address] UNIT or INPATIENT MEDICAL UNIT to [facility] (using Discharge Readmit Navigator) - Admit Patient to [facility] Unit  Once                      Discharge Date: 09/28/23    Consultations During Hospital Stay:  · Toxicology, Psychiatry    Procedures Performed:   · None    Significant Findings / Test Results:   · INR supratherpueitc      Test Results Pending at Discharge (will require follow up): · None     Outpatient Tests Requested:  · None    Complications:  None    Reason for Admission: Suicide attempt     Hospital Course:   Fany Pressley is a 47 y.o. female patient who originally presented to the hospital on 9/26/2023 due to a suicide attempt. She intentionally took a large amount cyclobenzaprine in a plan to harm herself. She presented to the ED after having second thoughts. She signed a 201 form for voluntary commitment to inpatient psychiatry hospital. Psychiatry agreed that she met criteria for either voluntary or involuntary commitment. She did have the control team activated twice for attempts at [facility] from the hospital. She was transferred to [facility] for psychiatric care. Please see above list of diagnoses and related plan for additional information. Condition at Discharge: good    Discharge Day Visit / Exam:   * Please refer to separate progress note for these details *    Discharge instructions/Information to patient and family:   See after visit summary for information provided to patient and family. Provisions for Follow-Up Care:  See after visit summary for information related to follow-up care and any pertinent home health orders. Disposition:   Inpatient Psychiatry at [address] [place] [address] Readmission: Yes    Discharge Medications:  See after visit summary for reconciled discharge medications provided to patient and/or family.       **Please Note: This note may have been constructed using a voice recognition system**

## 2023-09-28 NOTE — PLAN OF CARE
Problem: Potential for Falls  Goal: Patient will remain free of falls  Description: INTERVENTIONS:  - Educate patient/family on patient safety including physical limitations  - Instruct patient to call for assistance with activity   - Consult OT/PT to assist with strengthening/mobility   - Keep Call bell within reach  - Keep bed low and locked with side rails adjusted as appropriate  - Keep care items and personal belongings within reach  - Initiate and maintain comfort rounds  - Make Fall Risk Sign visible to staff  - Offer Toileting every 2 Hours, in advance of need  - Initiate/Maintain bed alarm  - Obtain necessary fall risk management equipment  - Apply yellow socks and bracelet for high fall risk patients  - Consider moving patient to room near nurses station  Outcome: Progressing     Problem: MOBILITY - ADULT  Goal: Maintain or return to baseline ADL function  Description: INTERVENTIONS:  -  Assess patient's ability to carry out ADLs; assess patient's baseline for ADL function and identify physical deficits which impact ability to perform ADLs (bathing, care of mouth/teeth, toileting, grooming, dressing, etc.)  - Assess/evaluate cause of self-care deficits   - Assess range of motion  - Assess patient's mobility; develop plan if impaired  - Assess patient's need for assistive devices and provide as appropriate  - Encourage maximum independence but intervene and supervise when necessary  - Involve family in performance of ADLs  - Assess for home care needs following discharge   - Consider OT consult to assist with ADL evaluation and planning for discharge  - Provide patient education as appropriate  Outcome: Progressing  Goal: Maintains/Returns to pre admission functional level  Description: INTERVENTIONS:  - Perform BMAT or MOVE assessment daily.   - Set and communicate daily mobility goal to care team and patient/family/caregiver.    - Collaborate with rehabilitation services on mobility goals if consulted  - Perform Range of Motion 4 times a day. - Reposition patient every 2 hours.   - Dangle patient 2 times a day  - Stand patient 2 times a day  - Ambulate patient 2 times a day  - Out of bed to chair 2 times a day   - Out of bed for meals 2 times a day  - Out of bed for toileting  - Record patient progress and toleration of activity level   Outcome: Progressing     Problem: SAFETY,RESTRAINT: NV/NON-SELF DESTRUCTIVE BEHAVIOR  Goal: Remains free of harm/injury (restraint for non violent/non self-detsructive behavior)  Description: INTERVENTIONS:  - Instruct patient/family regarding restraint use   - Assess and monitor physiologic and psychological status   - Provide interventions and comfort measures to meet assessed patient needs   - Identify and implement measures to help patient regain control  - Assess readiness for release of restraint   Outcome: Progressing  Goal: Returns to optimal restraint-free functioning  Description: INTERVENTIONS:  - Assess the patient's behavior and symptoms that indicate continued need for restraint  - Identify and implement measures to help patient regain control  - Assess readiness for release of restraint   Outcome: Progressing

## 2023-09-28 NOTE — ASSESSMENT & PLAN NOTE
Lab Results   Component Value Date    INR 2.35 (H) 09/28/2023    INR 4.36 (H) 09/27/2023   Home warfarin dose 6mg daily    Plan:  · Resume warfarin at 6mg daily.

## 2023-09-28 NOTE — ASSESSMENT & PLAN NOTE
· Past medical history of prior suicide attempts. Presented with suicide attempt by taking 25 pills of Flexeril 10 mg pills. Per ED note, patient called EMS herself and wanted to voluntarily commit herself to inpatient psych facility. · 9/27 patient signed a 201. Did attempt to flee later in the day so control team called. Plan:  · Patient medically stable for transfer to inpatient psych facility.  Will be transferred to [facility]

## 2023-09-28 NOTE — PLAN OF CARE
Problem: INFECTION - ADULT  Goal: Absence or prevention of progression during hospitalization  Description: INTERVENTIONS:  - Assess and monitor for signs and symptoms of infection  - Monitor lab/diagnostic results  - Monitor all insertion sites, i.e. indwelling lines, tubes, and drains  - Monitor endotracheal if appropriate and nasal secretions for changes in amount and color  - Draper appropriate cooling/warming therapies per order  - Administer medications as ordered  - Instruct and encourage patient and family to use good hand hygiene technique  - Identify and instruct in appropriate isolation precautions for identified infection/condition  Outcome: Progressing  Goal: Absence of fever/infection during neutropenic period  Description: INTERVENTIONS:  - Monitor WBC    Outcome: Progressing     Problem: SAFETY ADULT  Goal: Patient will remain free of falls  Description: INTERVENTIONS:  - Educate patient/family on patient safety including physical limitations  - Instruct patient to call for assistance with activity   - Consult OT/PT to assist with strengthening/mobility   - Keep Call bell within reach  - Keep bed low and locked with side rails adjusted as appropriate  - Keep care items and personal belongings within reach  - Initiate and maintain comfort rounds  - Make Fall Risk Sign visible to staff  - Offer Toileting every  Hours, in advance of need  - Initiate/Maintain alarm  - Obtain necessary fall risk management equipment:   - Apply yellow socks and bracelet for high fall risk patients  - Consider moving patient to room near nurses station  Outcome: Progressing  Goal: Maintain or return to baseline ADL function  Description: INTERVENTIONS:  -  Assess patient's ability to carry out ADLs; assess patient's baseline for ADL function and identify physical deficits which impact ability to perform ADLs (bathing, care of mouth/teeth, toileting, grooming, dressing, etc.)  - Assess/evaluate cause of self-care deficits   - Assess range of motion  - Assess patient's mobility; develop plan if impaired  - Assess patient's need for assistive devices and provide as appropriate  - Encourage maximum independence but intervene and supervise when necessary  - Involve family in performance of ADLs  - Assess for home care needs following discharge   - Consider OT consult to assist with ADL evaluation and planning for discharge  - Provide patient education as appropriate  Outcome: Progressing  Goal: Maintains/Returns to pre admission functional level  Description: INTERVENTIONS:  - Perform BMAT or MOVE assessment daily.   - Set and communicate daily mobility goal to care team and patient/family/caregiver. - Collaborate with rehabilitation services on mobility goals if consulted  - Perform Range of Motion  times a day. - Reposition patient every  hours. - Dangle patient  times a day  - Stand patient  times a day  - Ambulate patient  times a day  - Out of bed to chair  times a day   - Out of bed for meals  times a day  - Out of bed for toileting  - Record patient progress and toleration of activity level   Outcome: Progressing     Problem: Ineffective Coping  Goal: Cooperates with admission process  Description: Interventions:   - Complete admission process  Outcome: Progressing  Goal: Identifies ineffective coping skills  Outcome: Progressing  Goal: Identifies healthy coping skills  Outcome: Progressing  Goal: Demonstrates healthy coping skills  Outcome: Progressing  Goal: Participates in unit activities  Description: Interventions:  - Provide therapeutic environment   - Provide required programming   - Redirect inappropriate behaviors   Outcome: Progressing  Goal: Understands least restrictive measures  Description: Interventions:  - Utilize least restrictive behavior  Outcome: Progressing     Problem: Anxiety  Goal: Anxiety is at manageable level  Description: Interventions:  - Assess and monitor patient's anxiety level.    - Monitor for signs and symptoms (heart palpitations, chest pain, shortness of breath, headaches, nausea, feeling jumpy, restlessness, irritable, apprehensive). - Collaborate with interdisciplinary team and initiate plan and interventions as ordered.   - [place] patient to unit/surroundings  - Explain treatment plan  - Encourage participation in care  - Encourage verbalization of concerns/fears  - Identify coping mechanisms  - Assist in developing anxiety-reducing skills  - Administer/offer alternative therapies  - Limit or eliminate stimulants  Outcome: Progressing

## 2023-09-28 NOTE — TELEPHONE ENCOUNTER
Appointment Change  Cancel, Reschedule, Change to Virtual      Who are you speaking with? Patient   If it is not the patient, is the caller listed on the communication consent form? N/A   Which provider is the appointment scheduled with? Zeke Cheng PA-C   When was the original appointment scheduled? Please list date and time 10/5/23 9am   At which location is the appointment scheduled to take place? [id number]   Was the appointment rescheduled? Was the appointment changed from an in person visit to a virtual visit? If so, please list the details of the change. 10/13/23 1pm   What is the reason for the appointment change? Pt in hospital       Was [facility] transport scheduled? N/A   Does [facility] transport need to be scheduled for the new visit (if applicable) N/A   Does the patient need an infusion appointment rescheduled? N/A   Does the patient have an upcoming infusion appointment scheduled? If so, when? No   Is the patient undergoing chemotherapy? N/A   For appointments cancelled with less than 24 hours:  Was the no-show policy reviewed?  N/A

## 2023-09-29 PROBLEM — E66.01 MORBID OBESITY WITH BMI OF 45.0-49.9, ADULT (HCC): Status: ACTIVE | Noted: 2023-09-29

## 2023-09-29 PROBLEM — F19.10 POLYSUBSTANCE ABUSE (HCC): Status: ACTIVE | Noted: 2023-09-29

## 2023-09-29 PROBLEM — G25.81 RESTLESS LEG SYNDROME: Status: ACTIVE | Noted: 2023-09-29

## 2023-09-29 LAB
25(OH)D3 SERPL-MCNC: 24.8 NG/ML (ref 30–100)
ALBUMIN SERPL BCP-MCNC: 3.5 G/DL (ref 3.5–5)
ALP SERPL-CCNC: 44 U/L (ref 34–104)
ALT SERPL W P-5'-P-CCNC: 17 U/L (ref 7–52)
ANION GAP SERPL CALCULATED.3IONS-SCNC: 10 MMOL/L
AST SERPL W P-5'-P-CCNC: 15 U/L (ref 13–39)
ATRIAL RATE: 83 BPM
BASOPHILS # BLD AUTO: 0.04 THOUSANDS/ÂΜL (ref 0–0.1)
BASOPHILS NFR BLD AUTO: 1 % (ref 0–1)
BILIRUB SERPL-MCNC: 0.29 MG/DL (ref 0.2–1)
BUN SERPL-MCNC: 12 MG/DL (ref 5–25)
CALCIUM SERPL-MCNC: 8.4 MG/DL (ref 8.4–10.2)
CHLORIDE SERPL-SCNC: 108 MMOL/L (ref 96–108)
CO2 SERPL-SCNC: 21 MMOL/L (ref 21–32)
CREAT SERPL-MCNC: 0.58 MG/DL (ref 0.6–1.3)
EOSINOPHIL # BLD AUTO: 0.09 THOUSAND/ÂΜL (ref 0–0.61)
EOSINOPHIL NFR BLD AUTO: 1 % (ref 0–6)
ERYTHROCYTE [DISTWIDTH] IN BLOOD BY AUTOMATED COUNT: 13.9 % (ref 11.6–15.1)
FOLATE SERPL-MCNC: 19.2 NG/ML
GFR SERPL CREATININE-BSD FRML MDRD: 107 ML/MIN/1.73SQ M
GLUCOSE P FAST SERPL-MCNC: 97 MG/DL (ref 65–99)
GLUCOSE SERPL-MCNC: 97 MG/DL (ref 65–140)
HCT VFR BLD AUTO: 41.8 % (ref 34.8–46.1)
HGB BLD-MCNC: 13.2 G/DL (ref 11.5–15.4)
IMM GRANULOCYTES # BLD AUTO: 0.1 THOUSAND/UL (ref 0–0.2)
IMM GRANULOCYTES NFR BLD AUTO: 1 % (ref 0–2)
INR PPP: 1.62 (ref 0.84–1.19)
LYMPHOCYTES # BLD AUTO: 2.15 THOUSANDS/ÂΜL (ref 0.6–4.47)
LYMPHOCYTES NFR BLD AUTO: 25 % (ref 14–44)
MAGNESIUM SERPL-MCNC: 2.1 MG/DL (ref 1.9–2.7)
MCH RBC QN AUTO: 30 PG (ref 26.8–34.3)
MCHC RBC AUTO-ENTMCNC: 31.6 G/DL (ref 31.4–37.4)
MCV RBC AUTO: 95 FL (ref 82–98)
MONOCYTES # BLD AUTO: 0.88 THOUSAND/ÂΜL (ref 0.17–1.22)
MONOCYTES NFR BLD AUTO: 10 % (ref 4–12)
NEUTROPHILS # BLD AUTO: 5.46 THOUSANDS/ÂΜL (ref 1.85–7.62)
NEUTS SEG NFR BLD AUTO: 62 % (ref 43–75)
NRBC BLD AUTO-RTO: 0 /100 WBCS
P AXIS: 62 DEGREES
PHOSPHATE SERPL-MCNC: 3.9 MG/DL (ref 2.7–4.5)
PLATELET # BLD AUTO: 161 THOUSANDS/UL (ref 149–390)
PMV BLD AUTO: 10.1 FL (ref 8.9–12.7)
POTASSIUM SERPL-SCNC: 3.8 MMOL/L (ref 3.5–5.3)
PR INTERVAL: 188 MS
PROT SERPL-MCNC: 6 G/DL (ref 6.4–8.4)
PROTHROMBIN TIME: 19.5 SECONDS (ref 11.6–14.5)
QRS AXIS: -25 DEGREES
QRSD INTERVAL: 92 MS
QT INTERVAL: 370 MS
QTC INTERVAL: 434 MS
RBC # BLD AUTO: 4.4 MILLION/UL (ref 3.81–5.12)
SODIUM SERPL-SCNC: 139 MMOL/L (ref 135–147)
T WAVE AXIS: 34 DEGREES
T4 FREE SERPL-MCNC: 0.84 NG/DL (ref 0.61–1.12)
TSH SERPL DL<=0.05 MIU/L-ACNC: 7 UIU/ML (ref 0.45–4.5)
VALPROATE SERPL-MCNC: 34 UG/ML (ref 50–100)
VENTRICULAR RATE: 83 BPM
VIT B12 SERPL-MCNC: 510 PG/ML (ref 180–914)
WBC # BLD AUTO: 8.72 THOUSAND/UL (ref 4.31–10.16)

## 2023-09-29 PROCEDURE — 83735 ASSAY OF MAGNESIUM: CPT

## 2023-09-29 PROCEDURE — 99223 1ST HOSP IP/OBS HIGH 75: CPT | Performed by: STUDENT IN AN ORGANIZED HEALTH CARE EDUCATION/TRAINING PROGRAM

## 2023-09-29 PROCEDURE — 93005 ELECTROCARDIOGRAM TRACING: CPT

## 2023-09-29 PROCEDURE — 99233 SBSQ HOSP IP/OBS HIGH 50: CPT | Performed by: STUDENT IN AN ORGANIZED HEALTH CARE EDUCATION/TRAINING PROGRAM

## 2023-09-29 PROCEDURE — 82607 VITAMIN B-12: CPT

## 2023-09-29 PROCEDURE — 85610 PROTHROMBIN TIME: CPT | Performed by: NURSE PRACTITIONER

## 2023-09-29 PROCEDURE — 82306 VITAMIN D 25 HYDROXY: CPT

## 2023-09-29 PROCEDURE — 84439 ASSAY OF FREE THYROXINE: CPT

## 2023-09-29 PROCEDURE — 80053 COMPREHEN METABOLIC PANEL: CPT

## 2023-09-29 PROCEDURE — 84100 ASSAY OF PHOSPHORUS: CPT

## 2023-09-29 PROCEDURE — 93010 ELECTROCARDIOGRAM REPORT: CPT | Performed by: INTERNAL MEDICINE

## 2023-09-29 PROCEDURE — 80164 ASSAY DIPROPYLACETIC ACD TOT: CPT

## 2023-09-29 PROCEDURE — 84443 ASSAY THYROID STIM HORMONE: CPT

## 2023-09-29 PROCEDURE — 99222 1ST HOSP IP/OBS MODERATE 55: CPT | Performed by: NURSE PRACTITIONER

## 2023-09-29 PROCEDURE — 82746 ASSAY OF FOLIC ACID SERUM: CPT

## 2023-09-29 PROCEDURE — 85025 COMPLETE CBC W/AUTO DIFF WBC: CPT

## 2023-09-29 RX ORDER — ESTRADIOL 1 MG/1
0.5 TABLET ORAL DAILY
Status: DISCONTINUED | OUTPATIENT
Start: 2023-09-29 | End: 2023-10-06 | Stop reason: HOSPADM

## 2023-09-29 RX ORDER — CHLORPROMAZINE HYDROCHLORIDE 25 MG/ML
100 INJECTION INTRAMUSCULAR ONCE
Status: DISCONTINUED | OUTPATIENT
Start: 2023-09-29 | End: 2023-09-29

## 2023-09-29 RX ORDER — PILOCARPINE HYDROCHLORIDE 5 MG/1
5 TABLET, FILM COATED ORAL 2 TIMES DAILY
Status: DISCONTINUED | OUTPATIENT
Start: 2023-09-29 | End: 2023-09-29

## 2023-09-29 RX ORDER — MEMANTINE HYDROCHLORIDE 10 MG/1
10 TABLET ORAL DAILY
Status: DISCONTINUED | OUTPATIENT
Start: 2023-09-29 | End: 2023-10-06 | Stop reason: HOSPADM

## 2023-09-29 RX ORDER — HYDROXYZINE 50 MG/1
100 TABLET, FILM COATED ORAL EVERY 6 HOURS PRN
Status: DISCONTINUED | OUTPATIENT
Start: 2023-09-29 | End: 2023-10-06 | Stop reason: HOSPADM

## 2023-09-29 RX ORDER — ALBUTEROL SULFATE 90 UG/1
2 AEROSOL, METERED RESPIRATORY (INHALATION) EVERY 4 HOURS PRN
Status: DISCONTINUED | OUTPATIENT
Start: 2023-09-29 | End: 2023-10-06 | Stop reason: HOSPADM

## 2023-09-29 RX ORDER — WARFARIN SODIUM 6 MG/1
3 TABLET ORAL
Status: COMPLETED | OUTPATIENT
Start: 2023-09-29 | End: 2023-09-29

## 2023-09-29 RX ORDER — ROPINIROLE 1 MG/1
1 TABLET, FILM COATED ORAL
Status: DISCONTINUED | OUTPATIENT
Start: 2023-09-29 | End: 2023-10-06 | Stop reason: HOSPADM

## 2023-09-29 RX ORDER — AMLODIPINE BESYLATE 5 MG/1
5 TABLET ORAL DAILY
Status: DISCONTINUED | OUTPATIENT
Start: 2023-09-29 | End: 2023-10-06 | Stop reason: HOSPADM

## 2023-09-29 RX ORDER — LEVOTHYROXINE SODIUM 0.12 MG/1
125 TABLET ORAL
Status: DISCONTINUED | OUTPATIENT
Start: 2023-09-29 | End: 2023-10-06 | Stop reason: HOSPADM

## 2023-09-29 RX ORDER — FUROSEMIDE 20 MG/1
20 TABLET ORAL DAILY
Status: DISCONTINUED | OUTPATIENT
Start: 2023-09-29 | End: 2023-10-06 | Stop reason: HOSPADM

## 2023-09-29 RX ORDER — LOSARTAN POTASSIUM 50 MG/1
50 TABLET ORAL DAILY
Status: DISCONTINUED | OUTPATIENT
Start: 2023-09-29 | End: 2023-10-06 | Stop reason: HOSPADM

## 2023-09-29 RX ORDER — PANTOPRAZOLE SODIUM 20 MG/1
20 TABLET, DELAYED RELEASE ORAL
Status: DISCONTINUED | OUTPATIENT
Start: 2023-09-30 | End: 2023-10-06 | Stop reason: HOSPADM

## 2023-09-29 RX ORDER — NALTREXONE HYDROCHLORIDE 50 MG/1
25 TABLET, FILM COATED ORAL DAILY
Status: DISCONTINUED | OUTPATIENT
Start: 2023-09-29 | End: 2023-09-30

## 2023-09-29 RX ORDER — OXYBUTYNIN CHLORIDE 5 MG/1
5 TABLET ORAL 2 TIMES DAILY
Status: DISCONTINUED | OUTPATIENT
Start: 2023-09-29 | End: 2023-10-06 | Stop reason: HOSPADM

## 2023-09-29 RX ORDER — CYPROHEPTADINE HYDROCHLORIDE 4 MG/1
4 TABLET ORAL 3 TIMES DAILY PRN
Status: DISCONTINUED | OUTPATIENT
Start: 2023-09-29 | End: 2023-10-06 | Stop reason: HOSPADM

## 2023-09-29 RX ADMIN — TOPIRAMATE 200 MG: 100 TABLET, FILM COATED ORAL at 08:14

## 2023-09-29 RX ADMIN — HYDROXYZINE HYDROCHLORIDE 50 MG: 50 TABLET, FILM COATED ORAL at 10:06

## 2023-09-29 RX ADMIN — DIVALPROEX SODIUM 750 MG: 250 TABLET, DELAYED RELEASE ORAL at 21:02

## 2023-09-29 RX ADMIN — MEMANTINE 10 MG: 10 TABLET ORAL at 14:12

## 2023-09-29 RX ADMIN — WARFARIN SODIUM 3 MG: 6 TABLET ORAL at 18:56

## 2023-09-29 RX ADMIN — CHOLECALCIFEROL TAB 25 MCG (1000 UNIT) 1000 UNITS: 25 TAB at 08:14

## 2023-09-29 RX ADMIN — FUROSEMIDE 20 MG: 20 TABLET ORAL at 14:12

## 2023-09-29 RX ADMIN — OXYBUTYNIN CHLORIDE 5 MG: 5 TABLET ORAL at 18:56

## 2023-09-29 RX ADMIN — TOPIRAMATE 200 MG: 100 TABLET, FILM COATED ORAL at 17:00

## 2023-09-29 RX ADMIN — LOSARTAN POTASSIUM 50 MG: 50 TABLET, FILM COATED ORAL at 14:12

## 2023-09-29 RX ADMIN — LEVOTHYROXINE SODIUM 125 MCG: 125 TABLET ORAL at 09:27

## 2023-09-29 RX ADMIN — METOPROLOL TARTRATE 25 MG: 25 TABLET, FILM COATED ORAL at 21:02

## 2023-09-29 RX ADMIN — METOPROLOL TARTRATE 25 MG: 25 TABLET, FILM COATED ORAL at 14:11

## 2023-09-29 RX ADMIN — OXYBUTYNIN CHLORIDE 5 MG: 5 TABLET ORAL at 14:16

## 2023-09-29 RX ADMIN — ESTRADIOL 0.5 MG: 1 TABLET ORAL at 14:11

## 2023-09-29 RX ADMIN — BUPROPION HYDROCHLORIDE 300 MG: 300 TABLET, EXTENDED RELEASE ORAL at 08:14

## 2023-09-29 RX ADMIN — NALTREXONE HYDROCHLORIDE 25 MG: 50 TABLET, FILM COATED ORAL at 11:59

## 2023-09-29 RX ADMIN — DIVALPROEX SODIUM 750 MG: 250 TABLET, DELAYED RELEASE ORAL at 08:14

## 2023-09-29 RX ADMIN — AMLODIPINE BESYLATE 5 MG: 5 TABLET ORAL at 14:11

## 2023-09-29 NOTE — H&P
Psychiatric Evaluation - Behavioral Health     Identification Data:Blanca Chauhan Kulwinder 47 y.o. female MRN: [id number]  Unit/Bed#: [facility] 736-14 Encounter: [id number]    Chief Complaint:     History of present illness:    Dayana Riley is a 47 y.o.  female, single (no children), working with people with disability (3 days/ week, 7h/day), domiciled alone with her cat w/ PMH of multiple medical conditions including obesity (BMI: 52), MAG (on CPAP), COPD, h/o PE,  HTN, hypothyroidism, Sjogren's syndrome, overactive bladder, GERD, chronic LBP, DDD, migraine, and PPH of Schizoaffective disorder, Borderline Personality Disorder, polysubstance abuse (methamphetamines [radha since 9/2/23 as per patient), THC, alcohol), multiple prior psychiatric admissions, multiple prior SA / gestures, h/o self-injurious behavior who was BIB EMS activated by the patient following SA via OD on 25 tablets of Flexaril 10 mg on 9/26/23. The patient was evaluated by psychiatry consult on 9/27/23. She signed 201 and got admitted to the inpatient psychiatry unit 6B for further psychiatric stabilization. The pt was visited on the unit; chart reviewed. On approach, the patient was irritable, yelling in her room, ruminating on her clothes being lost, stated that she is not willing to talk and wanted to sign 72 h letter, making threatening gestures to the writer and tried to slam the door when the writer was entering the room. The patient was verbally redirectable and did not require IM meds at the time, later apologized the writer and became more pleasant and cooperative. The patient reported feeling more depressed over a month in the context of multiple psychosocial stressors including medical issues, stressors at work and noncompliance with her medication for about 2 weeks.   She noted that she took a leave of absence around her mother's death anniversary on August 18 and then went back to work temporarily but had to take medical leave due to pulmonary emboli. She went back to work on Monday and worked for 2 days but reportedly felt very overwhelmed and stressed out at work. She also noted that she started the program for substance abuse at [facility] and only attended 1 group which was overwhelming to the patient. She stated that she was not able to handle the stressors, overwhelmed with her situation and when she was reported the online with [facility], she overdosed on Flexaril, and then she called 911 was brought into the hospital.  She admitted poor frustration tolerance getting angry and irritable easily and also reported poor attention and focus and as per patient her psychiatrist wanted to start her on Strattera for possible ADHD. She reported interrupted sleep despite using the CPAP machine waking up every 2 hours or so but sleeps for 6 to 7 hours on average. She denied any auditory hallucination lately but reported very visual hallucinations. She noted that she had a full-blown manic episode earlier in summer but was not able to remember the exact time. When the patient expressed distress over her clothes reportedly being lost in the Emergency Department, she was asked if she still harbored thoughts of wanting to die and why the loss of her clothes upset her. She responded, Lashae Larisa were my favorite clothes. I don’t want to die anymore.” Denied SI/HI, intent or plan upon direct inquiry at this time. The patient agreed to verbalize any suicidal thoughts, frustrations or concerns to the nursing staff, immediately. She noted that loss of her mother in hospice care was traumatic to her and she has recurrent memories but denied any flashbacks or nightmares. She denied any history of eating disorder. She reported having "OCD", stated that everything should be in certain way and if someone messed up with her desk she gets frustrated at work I would not be able to work until fix her desk.   She also reported washing her hands multiple times especially while at work. She smokes "medical marijuana". Has a h/o methamphetamine use disorder, reportedly clean since 9/2/23. Denied drinking alcohol (last use was a couple of years ago as per patient) or other illicit substance use. The patient received Haldol Dec 50 mg IM on 9/27/23. Maintained on Wellbutrin  mg daily, Depakote 750 mg nightly and Topamax 200 mg BID. She was started on Naltrexone 25 mg daily for impulsivity. List of meds to be confirmed by the ACT team, and doses to be adjusted as indicated. Psychiatric Review Of Systems:  Pertinent items are noted in HPI; all others negative    Historical Information     Past Psychiatric History:   Past Inpatient Psychiatric Treatment:   Multiple past inpatient psychiatric admissions  Past Outpatient Psychiatric Treatment:    Currently in outpatient psychiatric treatment with a psychiatrist and f/u with [facility]  Past Suicide Attempts: yes, multiple attempts via OD and h/o cutting herself  Past Violent Behavior: yes: h/o aggression and violence in previous admission and also in the ED required IM GEodon  Past Psychiatric Medication Trials: patient does not remember and multiple psychiatric medication trials    Substance Abuse History:  She smokes "medical marijuana". Has a h/o methamphetamine use disorder, reportedly clean since 9/2/23. Denied drinking alcohol (last use was a couple of years ago as per patient) or other illicit substance use.    Social History     Substance and Sexual Activity   Alcohol Use Not Currently    Comment: Recovering alcoholic     Social History     Substance and Sexual Activity   Drug Use Yes   • Types: Marijuana, Methamphetamines    Comment: Trying to get off meth         Family Psychiatric History:   Family History   Problem Relation Age of Onset   • Kidney cancer Mother    • Diabetes Mother    • Depression Mother    • Stroke Mother    • Arthritis Mother    • Cancer Mother    • Psychiatric Illness Mother    • No Known Problems Father    • No Known Problems Sister    • No Known Problems Maternal Grandmother    • No Known Problems Maternal Grandfather    • No Known Problems Paternal Grandmother    • No Known Problems Paternal Grandfather    • Colon cancer Maternal Uncle    • Colon cancer Maternal Uncle    • Colon cancer Paternal Uncle    • Colon cancer Family    • Alcohol abuse Sister    • Asthma Sister        Social History:  Social History     Socioeconomic History   • Marital status: Single     Spouse name: Not on file   • Number of children: Not on file   • Years of education: Not on file   • Highest education level: Not on file   Occupational History   • Not on file   Tobacco Use   • Smoking status: Former     Packs/day: 2.00     Years: 30.00     Total pack years: 60.00     Types: Cigarettes     Start date: 1985     Quit date: 2018     Years since quittin.7   • Smokeless tobacco: Never   • Tobacco comments:     Started at age 14. Vaping Use   • Vaping Use: Some days   • Substances: THC   Substance and Sexual Activity   • Alcohol use: Not Currently     Comment: Recovering alcoholic   • Drug use: Yes     Types: Marijuana, Methamphetamines     Comment: Trying to get off meth   • Sexual activity: Not Currently     Partners: Male     Comment: defer   Other Topics Concern   • Not on file   Social History Narrative   • Not on file     Social Determinants of Health     Financial Resource Strain: High Risk (2022)    Overall Financial Resource Strain (CARDIA)    • Difficulty of Paying Living Expenses: Hard   Food Insecurity: No Food Insecurity (2023)    Hunger Vital Sign    • Worried About Running Out of Food in the Last Year: Never true    • Ran Out of Food in the Last Year: Never true   Transportation Needs: No Transportation Needs (2023)    PRAPARE - Transportation    • Lack of Transportation (Medical): No    • Lack of Transportation (Non-Medical):  No   Physical Activity: Not on file   Stress: Not on file   Social Connections: Not on file   Intimate Partner Violence: Not on file   Housing Stability: Unknown (9/7/2023)    Housing Stability Vital Sign    • Unable to Pay for Housing in the Last Year: No    • Number of Places Lived in the Last Year: Not on file    • Unstable Housing in the Last Year: No       Developmental:  Education: 10th grade - got her GED  Marital history: single  Children: no  Living arrangement, social support: lives alone with her cat  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;  21h per week)  Access to firearms: denied    Traumatic History:   Abuse:none is reported  Other Traumatic Events: mother's death was traumatic to the patient (reportedly was in hospice care)    Past Medical History:   Diagnosis Date   • Anxiety    • Arthritis     oa cassandra knees   • Asthma     good control- no medications   • Yan's esophagus    • Bipolar affective disorder ([address])    • Cannabis abuse with unspecified 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[address])    • Chronic pain disorder     lumbar   • COPD (chronic obstructive pulmonary disease) ([address])    • CPAP (continuous positive airway pressure) dependence    • Degenerative disc disease at L5-S1 level    • Deliberate self-cutting     9/15/22per pt has not done recently-- does see a therapist and psychiatrist regularly   • Depression 09/16/2008   • Disease of thyroid gland     hypo   • MARTINEZ (dyspnea on exertion)     per pt "has had this with exertion and not new"   • Drug overdose 10/28/2008    suicide attempt and again drug overdose 7/2022--[facility] floor and than transferred to [facility]   • Dysphagia    • Dyspnea    • Edema     BLE   • Family history of blood clots    • GERD (gastroesophageal reflux disease)    • Headache(784.0)    • Headache, tension-type    • High blood pressure    • History of COVID-19 12/30/2020    Symptoms started on 12/30/2020 and then got worse. Today she is feeling a little bit better.   She is a candidate for monoclonal antibody infusion and set for 1/6/21 @ 1pm at [facility]. 01/07/21 - telemedicine -    • Hyperlipidemia    • Hypertension    • Knee pain, bilateral     Especially right   • Medical marijuana use    • Memory loss    • Migraines    • Obesity    • Overactive bladder    • Sjogren's disease ([address])    • Sleep apnea    • Stress incontinence    • Suicidal ideations    • Teeth missing    • Tremor     per pt Tremors of Right Leg and both Arms   • Visual impairment    • Wears glasses        Medical Review Of Systems:  Pertinent items are noted in HPI; all others negative    Meds/Allergies   all current active meds have been reviewed, current meds:   Current Facility-Administered Medications   Medication Dose Route Frequency   • buPROPion (WELLBUTRIN XL) 24 hr tablet 300 mg  300 mg Oral Daily   • cholecalciferol (VITAMIN D3) tablet 1,000 Units  1,000 Units Oral Daily   • divalproex sodium (DEPAKOTE) DR tablet 750 mg  750 mg Oral Q12H [address]   • hydrOXYzine HCL (ATARAX) tablet 100 mg  100 mg Oral Q6H PRN   • hydrOXYzine HCL (ATARAX) tablet 25 mg  25 mg Oral Q6H PRN Max 4/day   • hydrOXYzine HCL (ATARAX) tablet 50 mg  50 mg Oral Q6H PRN Max 4/day   • ibuprofen (MOTRIN) tablet 200 mg  200 mg Oral Q6H PRN   • ibuprofen (MOTRIN) tablet 400 mg  400 mg Oral Q6H PRN   • ibuprofen (MOTRIN) tablet 600 mg  600 mg Oral Q8H PRN   • levothyroxine tablet 125 mcg  125 mcg Oral Early Morning   • LORazepam (ATIVAN) injection 1 mg  1 mg Intramuscular Q6H PRN Max 3/day   • naltrexone (REVIA) tablet 25 mg  25 mg Oral Daily   • OLANZapine (ZyPREXA) IM injection 5 mg  5 mg Intramuscular Q3H PRN Max 3/day   • OLANZapine (ZyPREXA) tablet 2.5 mg  2.5 mg Oral Q4H PRN Max 6/day   • OLANZapine (ZyPREXA) tablet 5 mg  5 mg Oral Q4H PRN Max 3/day   • OLANZapine (ZyPREXA) tablet 5 mg  5 mg Oral Q3H PRN Max 3/day   • polyethylene glycol (MIRALAX) packet 17 g  17 g Oral Daily PRN   • senna-docusate sodium (SENOKOT S) 8.6-50 mg per tablet 1 tablet  1 tablet Oral Daily PRN   • topiramate (TOPAMAX) tablet 200 mg  200 mg Oral BID   • traZODone (DESYREL) tablet 50 mg  50 mg Oral HS PRN    and PTA meds:   Prior to Admission Medications   Prescriptions Last Dose Informant Patient Reported? Taking? Cholecalciferol (Vitamin D3) 125 MCG (5000 UT) CAPS 9/28/2023 Self Yes Yes   Sig: Take by mouth in the morning   Diclofenac Sodium (VOLTAREN) 1 % Not Taking Self No No   Sig: Apply 2 g topically 3 (three) times a day as needed (pain) For pain to affected area   Patient not taking: Reported on 9/28/2023   Diclofenac Sodium (VOLTAREN) 1 % Not Taking Self No No   Sig: Apply 2 g topically 4 (four) times a day   Patient not taking: Reported on 9/28/2023   Fesoterodine Fumarate ER (Toviaz) 4 MG TB24 9/28/2023 Self Yes Yes   Sig: Take 1 tablet by mouth daily   Lurasidone HCl 60 MG TABS  Self No No   Sig: Take 1 tablet (60 mg total) by mouth 2 (two) times a day TAKEN IN THE MORNING AND AT NIGHT-----Latuda   RABEprazole (ACIPHEX) 20 MG tablet 9/28/2023 Self Yes Yes   Sig: Take 20 mg by mouth 2 (two) times a day   albuterol (Ventolin HFA) 90 mcg/act inhaler 9/28/2023 Self No Yes   Sig: Inhale 2 puffs every 6 (six) hours as needed for wheezing   amLODIPine (NORVASC) 5 mg tablet 9/28/2023 Self No Yes   Sig: TAKE 1 TABLET (5 MG TOTAL) BY MOUTH DAILY   ammonium lactate (LAC-HYDRIN) 12 % cream Not Taking Self Yes No   Patient not taking: Reported on 9/28/2023   buPROPion (WELLBUTRIN XL) 150 mg 24 hr tablet  Self No No   Sig: Take 1 tablet (150 mg total) by mouth daily   Patient not taking: Reported on 9/22/2023   buPROPion (WELLBUTRIN XL) 300 mg 24 hr tablet 9/28/2023 Self Yes Yes   Sig: Take 300 mg by mouth daily   carBAMazepine (CARBATROL) 300 MG 12 hr capsule 9/28/2023 Self Yes Yes   Sig: Take 1 capsule by mouth in the morning   clobetasol (TEMOVATE) 0.05 % ointment Not Taking Self No No   Sig: Apply to affected areas sparingly. Do not exceed 2 weeks of treatment.    Patient not taking: Reported on 9/28/2023   colestipol (COLESTID) 1 g tablet  Self No No   Sig: Take 1 tablet (1 g total) by mouth 2 (two) times a day   cyproheptadine (PERIACTIN) 4 mg tablet Not Taking Self Yes No   Patient not taking: Reported on 9/28/2023   divalproex sodium (DEPAKOTE) 250 mg EC tablet  Self No No   Sig: Take 3 tablets (750 mg total) by mouth every 12 (twelve) hours   ergocalciferol (ERGOCALCIFEROL) 1.25 MG (62514 UT) capsule Not Taking Self Yes No   Patient not taking: Reported on 9/28/2023   estradiol (ESTRACE) 0.5 MG tablet 9/28/2023 Self No Yes   Sig: Take 1 tablet (0.5 mg total) by mouth daily   fluticasone (FLOVENT HFA) 110 MCG/ACT inhaler Not Taking Self Yes No   Sig: Inhale 2 puffs as needed Rinse mouth after use.    Patient not taking: Reported on 9/28/2023   furosemide (LASIX) 20 mg tablet 9/28/2023 Self No Yes   Sig: Take 1 tablet (20 mg total) by mouth daily   haloperidol decanoate (Haldol Decanoate) 50 mg/mL injection 9/27/2023 Self Yes Yes   Sig: every 30 (thirty) days   hydrOXYzine HCL (ATARAX) 50 mg tablet Past Week Self No Yes   Sig: Take 1 tablet (50 mg total) by mouth daily at bedtime   hydrocortisone 2.5 % ointment Past Month Self No Yes   Sig: Apply topically 2 (two) times a day   levothyroxine (Euthyrox) 125 mcg tablet 9/28/2023 Self No Yes   Sig: Take 1 tablet (125 mcg total) by mouth daily in the early morning   losartan (COZAAR) 50 mg tablet 9/28/2023 Self No Yes   Sig: TAKE ONE TABLET BY MOUTH DAILY   memantine (NAMENDA) 10 mg tablet 9/28/2023 Self Yes Yes   metoprolol tartrate (LOPRESSOR) 25 mg tablet 9/28/2023 Self No Yes   Sig: TAKE ONE TABLET BY MOUTH EVERY 12 HOURS   ondansetron (ZOFRAN-ODT) 4 mg disintegrating tablet Not Taking Self No No   Sig: Take 1 tablet (4 mg total) by mouth every 6 (six) hours as needed for nausea or vomiting   Patient not taking: Reported on 9/28/2023   pilocarpine (SALAGEN) 5 mg tablet 9/28/2023 Self Yes Yes   Sig: Take 5 mg by mouth 2 (two) times a day   rOPINIRole (REQUIP) 1 mg tablet 9/28/2023 Self Yes Yes   topiramate (TOPAMAX) 100 mg tablet  Self No No   Sig: Take 1.5 tablets (150 mg total) by mouth 2 (two) times a day   Patient taking differently: Take 200 mg by mouth 2 (two) times a day   topiramate (TOPAMAX) 200 MG tablet   Yes No   trospium chloride (SANCTURA) 20 mg tablet 9/28/2023 Self Yes Yes   Sig: Take 20 mg by mouth 2 (two) times a day   venlafaxine (EFFEXOR) 37.5 mg tablet 9/28/2023 Self Yes Yes   Sig: Take 37.5 mg by mouth daily   warfarin (COUMADIN) 6 mg tablet 9/28/2023 Self No Yes   Sig: Take 6mg (one tablet) daily every day by mouth. Facility-Administered Medications: None     Allergies   Allergen Reactions   • Percocet [Oxycodone-Acetaminophen] Headache     Severe headaches   (denies issues with Tylenol)   • Povidone Iodine Rash     Unsure if betadine or gauze post surgical. Got rash on leg.    Has  Had itchy rashes after every surgery prep and IV insertion   • Medical Tape Hives   • Chlorhexidine Rash     Per pt "able to use the liquid soap--thinkd reaction from the sponges or wipes"     Objective      Mental Status Evaluation:  Appearance and attitude: appeared as stated age, cooperative and attentive, dressed in hospital attire, overweight, tattooed, with good hygiene  Eye contact: good  Motor Function: within normal limits, intact gait, No PMA/PMR  Gait/station: normal gait/station and normal balance  Speech: normal for rate, rhythm, volume, latency, amount  Language: No overt abnormality  Mood/affect: labile / Affect was tearful, labile, hyper-intense, mood-congruent  Thought Processes: linear, concrete  Thought content: denied suicidal ideations or homicidal ideations, no overt delusions elicited, preoccupied with her clothes being lost  Associations: concrete associations  Perceptual disturbances: no auditory hallucinations, vague visual hallucinations  Orientation: oriented to time, person, place and to the situational context  Cognitive Function: intact  Memory: not cooperative with formal MMSE  Intellect: unable to assess  Fund of knowledge: aware of current events, aware of past history and vocabulary average  Impulse control: impulsive at times  Insight/judgment: fair/fair    Lab Results: I have personally reviewed pertinent lab results.         WBC   Date Value Ref Range Status   09/29/2023 8.72 4.31 - 10.16 Thousand/uL Final     WBC, UA   Date Value Ref Range Status   09/07/2023 1-2 None Seen, 1-2 /hpf Final     MCV   Date Value Ref Range Status   09/29/2023 95 82 - 98 fL Final     Lab Results   Component Value Date    BUN 12 09/29/2023    SODIUM 139 09/29/2023    CO2 21 09/29/2023     Lab Results   Component Value Date    ALKPHOS 44 09/29/2023     No results found for: "CKMB"  Lab Results   Component Value Date    TSH 3.90 08/24/2017     INR   Date Value Ref Range Status   09/28/2023 2.35 (H) 0.84 - 1.19 Final   09/27/2023 4.36 (H) 0.84 - 1.19 Final   09/26/2023 4.34 (H) 0.84 - 1.19 Final     No results found for: "APTT"  No results found for: "PHENO"  Sodium   Date Value Ref Range Status   09/29/2023 139 135 - 147 mmol/L Final   09/06/2018 138 135 - 146 mmol/L Final     BUN   Date Value Ref Range Status   09/29/2023 12 5 - 25 mg/dL Final   09/06/2018 7 7 - 25 mg/dL Final     SL AMB BUN/CREATININE RATIO   Date Value Ref Range Status   09/35/2422 NOT APPLICABLE 6 - 22 (calc) Final     Creatinine   Date Value Ref Range Status   09/29/2023 0.58 (L) 0.60 - 1.30 mg/dL Final     Comment:     Standardized to IDMS reference method     TSH   Date Value Ref Range Status   08/24/2017 3.90 mIU/L Final     Comment:               Reference Range                         > or = 20 Years  0.40-4.50                              Pregnancy Ranges            First trimester    0.26-2.66            Second trimester   0.55-2.73            Third trimester    0.43-2.91       TSH 3RD GENERATON   Date Value Ref Range Status   09/29/2023 7.002 (H) 0.450 - 4.500 uIU/mL Final     Comment:     The recommended reference ranges for TSH during pregnancy are as follows:   First trimester 0.1 to 2.5 uIU/mL   Second trimester  0.2 to 3.0 uIU/mL   Third trimester 0.3 to 3.0 uIU/m    Note: Normal ranges may not apply to patients who are transgender, non-binary, or whose legal sex, sex at birth, and gender identity differ. Adult TSH (3rd generation) reference range follows the recommended guidelines of the [employer] Association, January, 2020.      WBC   Date Value Ref Range Status   09/29/2023 8.72 4.31 - 10.16 Thousand/uL Final     WBC, UA   Date Value Ref Range Status   09/07/2023 1-2 None Seen, 1-2 /hpf Final     No components found for: "B12"  Lab Results   Component Value Date    FOLATE 14.7 07/17/2022     Lab Results   Component Value Date    RPR Non-Reactive 07/17/2022         Imaging Studies: reviewed    EKG, Pathology, and Other Studies: reviewed - new EKG ordered    Code Status:Full code    Patient Strengths/Assets: communication skills, family ties, patient is on a voluntary commitment, well known to [facility]    Patient Barriers/Limitations: difficulty adapting, noncompliant with medication, poor physical health, substance abuse    Suicide/Homicide Risk Assessment:    Risk of Harm to Self:   Nursing Suicide Risk Assessment Last 24 hours: C-SSRS Risk (Since Last Contact)  Calculated C-SSRS Risk Score (Since Last Contact): No Risk Indicated  Current Specific Risk Factors include: recent serious suicide attempt, self abusive behavior, diagnosis of mood disorder, health problems, substance use  Protective Factors: no current suicidal ideation, ability to communicate with staff on the unit, able to contract for safety on the unit  Based on today's assessment, Poncho Mail presents the following risk of harm to self: chronically high risk; low at this time    Risk of Harm to Others:  Nursing Homicide Risk Assessment: Violence Risk to Others: Denies within past 6 months  Current Specific Risk Factors include: behavior suggesting impulsivity, behavior suggesting loss of control, recent history of violent behavior, multiple stressors  Protective Factors: no current homicidal ideation, able to communicate with staff on the unit  Based on today's assessment, Poppy Ba presents the following risk of harm to others: chronically elevated; low at this time    The following interventions are recommended: behavioral checks every 7 minutes, continued hospitalization on locked unit    Assessment/Plan     Principal Problem:    Schizoaffective disorder, bipolar type ([address])  Active Problems:    Borderline personality disorder (HCC)    Polysubstance abuse (HCC)    MAG (obstructive sleep apnea)    Plan:   Risks, benefits and possible side effects of Medications:   Risks, benefits, and possible side effects of medications explained to patient and patient verbalizes understanding.       - Expand collat information and confirm the list of meds  - EKG  - f/u SLIM recs regarding the medical problems  - Continue Haldol Dec 50 mg IM q4 weeks; doses to be adjusted as indicated  - Continue medication titration and treatment plan; adjust medication to optimize treatment response and as clinically indicated.      Scheduled medications:  Current Facility-Administered Medications   Medication Dose Route Frequency Provider Last Rate   • buPROPion  300 mg Oral Daily JHONATAN Uribe     • cholecalciferol  1,000 Units Oral Daily JHONATAN Uribe     • divalproex sodium  750 mg Oral Q12H [address] JHONATAN Uribe     • hydrOXYzine HCL  100 mg Oral Q6H PRN Nati Flores MD     • hydrOXYzine HCL  25 mg Oral Q6H PRN Max 4/day JHONATAN Uribe     • hydrOXYzine HCL  50 mg Oral Q6H PRN Max 4/day JHONATAN Uribe     • ibuprofen  200 mg Oral Q6H PRN JHONATAN Uribe     • ibuprofen  400 mg Oral Q6H PRN JHONATAN Uribe     • ibuprofen  600 mg Oral Q8H PRN JHONATAN Uribe     • levothyroxine  125 mcg Oral Early Morning JHONATAN Berg     • LORazepam  1 mg Intramuscular Q6H PRN Max 3/day JHONATAN Escobar     • naltrexone  25 mg Oral Daily Roxanne oPlk MD     • OLANZapine  5 mg Intramuscular Q3H PRN Max 3/day JHONATAN Escobar     • OLANZapine  2.5 mg Oral Q4H PRN Max 6/day LAURA EscobarNP     • OLANZapine  5 mg Oral Q4H PRN Max 3/day JHONATAN Escobar     • OLANZapine  5 mg Oral Q3H PRN Max 3/day JessicaJHONATAN Cameron     • polyethylene glycol  17 g Oral Daily PRN JHONATAN Escobar     • senna-docusate sodium  1 tablet Oral Daily PRN JHONATAN Escobar     • topiramate  200 mg Oral BID JHONATAN Escobar     • traZODone  50 mg Oral HS PRN JHONATAN Escobar          PRN:  •  hydrOXYzine HCL  •  hydrOXYzine HCL  •  hydrOXYzine HCL  •  ibuprofen  •  ibuprofen  •  ibuprofen  •  LORazepam  •  OLANZapine  •  OLANZapine  •  OLANZapine  •  OLANZapine  •  polyethylene glycol  •  senna-docusate sodium  •  traZODone    - Observation: routine    - VS: as per unit protocol  - Legal status:     - Diet: Regular diet  - Psychoeducation (benefits and potential risks) discussed, importance of compliance with the psychiatric treatment reiterated, and the patient verbalized understanding of the matter  - Encourage group attendance and milieu therapy     - The pt was educated and agreed to verbalize any suicidal thoughts, frustrations or concerns to the nursing staff, immediately. - Dispo:  To be determined       Next of Kin  Extended Emergency Contact Information  Primary Emergency Contact: Luigi Mason  Address: [address] [place] [address],[address] Floor, [facility] [place] [address] [address] Phone: [phone]  Mobile Phone: [phone]  Relation: Father  Secondary Emergency Contact: Leonor Ji  Address: [address], [address] [place] [address] of [address] [facility] Phone: [phone]  Mobile Phone: [phone]  Relation: Edil Florez MD  Attending [address]

## 2023-09-29 NOTE — ASSESSMENT & PLAN NOTE
· Patient takes daily Coumadin her dose was 6 mg daily her INR was supratherapeutic yesterday 9/28/2023 her INR was 2.35  · She is going to have a stat INR right now on 9/29/2023  · I will then dose her Coumadin for tonight  · She will have a repeat level tomorrow on 9/30/2023 and we will attempt to get her on the appropriate dosage to have an INR between 2 and 3  · After discussion with the patient the blood clot was discovered on 9/1/2023 she was put on Eliquis and when she went back in the hospital on 9/6/2023 they transitioned her to Coumadin she was discharged a week after that with no clear-cut plan of who was going to manage her Coumadin. · I have spoken at length with the care manager Perla Robles that we will need to get somebody to manage her Coumadin when she goes home  · Patient does state that she has an outpatient hematology appointment with a [facility] hematologist on 10/13/2023 Perla Robles is going to look into that.

## 2023-09-29 NOTE — ASSESSMENT & PLAN NOTE
· Patient has an old injury to her right elbow  · She uses Voltaren gel topically 4 times daily as needed pain

## 2023-09-29 NOTE — ASSESSMENT & PLAN NOTE
· Patient has a longstanding history greater than 1 year of polysubstance abuse including marijuana  · Patient needs polysubstance medication cessation education

## 2023-09-29 NOTE — NURSING NOTE
Pt got agitated this morning after speaking with psychiatrist. Provider initially ordered IM, but later told RN that it was no longer indicated. Pt given 50mg of PRN Atarax at 1006 for anxiety. Pt preoccupied with losing her belongings. Pt reports anxiety and depression. Denies SI/HI. Initially refused scheduled Synthroid this morning, but was later agreeable. Denies any unmet needs at this time.

## 2023-09-29 NOTE — ASSESSMENT & PLAN NOTE
· She has a rash under her eyes  · She uses hydrocortisone 2.5 mg ointment to under her eyes twice daily bilateral eyes as needed itch

## 2023-09-29 NOTE — PROGRESS NOTES
09/29/23 1436   Team Meeting   Meeting Type Tx Team Meeting   Initial Conference Date 09/29/23   Next Conference Date 10/27/23   Team Members Present   Team Members Present Physician;Nurse;   Physician Team Member Dr. Fatoumata Robbins Team Member [facility] Management Team Member Brianne Jones   Patient/Family Present   Patient Present Yes   Patient's Family Present No     Tx plan reviewed with the patient. The patient is in agreement with her goals and signed. The patient had no questions or concerns regarding her treatment plan. A copy of the patient's tx plan was put into her d/c folder and another copy put in for scanning.
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
## 2023-09-29 NOTE — PLAN OF CARE
Problem: INFECTION - ADULT  Goal: Absence or prevention of progression during hospitalization  Description: INTERVENTIONS:  - Assess and monitor for signs and symptoms of infection  - Monitor lab/diagnostic results  - Monitor all insertion sites, i.e. indwelling lines, tubes, and drains  - Monitor endotracheal if appropriate and nasal secretions for changes in amount and color  - [place] appropriate cooling/warming therapies per order  - Administer medications as ordered  - Instruct and encourage patient and family to use good hand hygiene technique  - Identify and instruct in appropriate isolation precautions for identified infection/condition  Outcome: Progressing  Goal: Absence of fever/infection during neutropenic period  Description: INTERVENTIONS:  - Monitor WBC    Outcome: Progressing     Problem: SAFETY ADULT  Goal: Patient will remain free of falls  Description: INTERVENTIONS:  - Educate patient/family on patient safety including physical limitations  - Instruct patient to call for assistance with activity   - Consult OT/PT to assist with strengthening/mobility   - Keep Call bell within reach  - Keep bed low and locked with side rails adjusted as appropriate  - Keep care items and personal belongings within reach  - Initiate and maintain comfort rounds  - Make Fall Risk Sign visible to staff  - Apply yellow socks and bracelet for high fall risk patients  - Consider moving patient to room near nurses station  Outcome: Progressing  Goal: Maintain or return to baseline ADL function  Description: INTERVENTIONS:  -  Assess patient's ability to carry out ADLs; assess patient's baseline for ADL function and identify physical deficits which impact ability to perform ADLs (bathing, care of mouth/teeth, toileting, grooming, dressing, etc.)  - Assess/evaluate cause of self-care deficits   - Assess range of motion  - Assess patient's mobility; develop plan if impaired  - Assess patient's need for assistive devices and provide as appropriate  - Encourage maximum independence but intervene and supervise when necessary  - Involve family in performance of ADLs  - Assess for home care needs following discharge   - Consider OT consult to assist with ADL evaluation and planning for discharge  - Provide patient education as appropriate  Outcome: Progressing  Goal: Maintains/Returns to pre admission functional level  Description: INTERVENTIONS:  - Perform BMAT or MOVE assessment daily.   - Set and communicate daily mobility goal to care team and patient/family/caregiver. - Collaborate with rehabilitation services on mobility goals if consulted  - Out of bed for toileting  - Record patient progress and toleration of activity level   Outcome: Progressing     Problem: Ineffective Coping  Goal: Cooperates with admission process  Description: Interventions:   - Complete admission process  Outcome: Progressing  Goal: Identifies ineffective coping skills  Outcome: Progressing  Goal: Identifies healthy coping skills  Outcome: Progressing  Goal: Demonstrates healthy coping skills  Outcome: Progressing  Goal: Participates in unit activities  Description: Interventions:  - Provide therapeutic environment   - Provide required programming   - Redirect inappropriate behaviors   Outcome: Progressing  Goal: Understands least restrictive measures  Description: Interventions:  - Utilize least restrictive behavior  Outcome: Progressing     Problem: Anxiety  Goal: Anxiety is at manageable level  Description: Interventions:  - Assess and monitor patient's anxiety level. - Monitor for signs and symptoms (heart palpitations, chest pain, shortness of breath, headaches, nausea, feeling jumpy, restlessness, irritable, apprehensive). - Collaborate with interdisciplinary team and initiate plan and interventions as ordered.   - [place] patient to unit/surroundings  - Explain treatment plan  - Encourage participation in care  - Encourage verbalization of concerns/fears  - Identify coping mechanisms  - Assist in developing anxiety-reducing skills  - Administer/offer alternative therapies  - Limit or eliminate stimulants  Outcome: Progressing

## 2023-09-29 NOTE — CONSULTS
[address] [facility]  Consult  Name: Carmen Albarran 47 y.o. female I MRN: [id number]  Unit/Bed#: Eufemia Dubois 644-35 I Date of Admission: 9/28/2023   Date of Service: 9/29/2023 I Hospital Day: 1    Inpatient consult for Medical Clearance for [address] [address] patient  Consult performed by: JHONATAN Deal  Consult ordered by:  JHONATAN Cuevas          Assessment/Plan   Medical clearance for psychiatric admission  Assessment & Plan  · Vital signs stable afebrile patient seen and examined by myself Labs from today 9/29/2023 reviewed sodium 138 potassium 3.8 creatinine 0.58 TSH 7.02  · Patient medically cleared for admit  · [facility] IM will sign off please call with any questions or concerns    Restless leg syndrome  Assessment & Plan  · Patient will continue to take her Requip 1 mg p.o. every 24 hours as needed restless leg syndrome    Morbid obesity with BMI of 45.0-49.9, adult ([facility])  Assessment & Plan  · Patient's current BMI 47.08  · Patient needs lifestyle modification  · Consider once outpatient a rolling and a weight loss management program    Polysubstance abuse ([address])  Assessment & Plan  · Patient has a longstanding history greater than 1 year of polysubstance abuse including marijuana  · Patient needs polysubstance medication cessation education    Contact dermatitis and eczema  Assessment & Plan  · She has a rash under her eyes  · She uses hydrocortisone 2.5 mg ointment to under her eyes twice daily bilateral eyes as needed itch    History of pulmonary embolism  Assessment & Plan  · Patient takes daily Coumadin her dose was 6 mg daily her INR was supratherapeutic yesterday 9/28/2023 her INR was 2.35  · She is going to have a stat INR right now on 9/29/2023  · I will then dose her Coumadin for tonight  · She will have a repeat level tomorrow on 9/30/2023 and we will attempt to get her on the appropriate dosage to have an INR between 2 and 3  · After discussion with the patient the blood clot was discovered on 9/1/2023 she was put on Eliquis and when she went back in the hospital on 9/6/2023 they transitioned her to Coumadin she was discharged a week after that with no clear-cut plan of who was going to manage her Coumadin. · I have spoken at length with the care manager Lukas Vasquez that we will need to get somebody to manage her Coumadin when she goes home  · Patient does state that she has an outpatient hematology appointment with a [facility] hematologist on 10/13/2023 Lukas Vasquez is going to look into that.     Contusion of elbow  Assessment & Plan  · Patient has an old injury to her right elbow  · She uses Voltaren gel topically 4 times daily as needed pain    Chronic tension-type headache, intractable  Assessment & Plan  · She will continue to take her Topamax 200 mg p.o. twice daily    Mixed hyperlipidemia  Assessment & Plan  · At home she takes CO LES TIP OL for her hyperlipidemia  · That is nonformulary at this institution  · She is going to just wait and take it when she gets home she does not want to try another agent    COPD (chronic obstructive pulmonary disease) ([address] [facility])  Assessment & Plan  · Patient will have an albuterol metered-dose inhaler 2 puffs every 4 as needed cough shortness of breath  · she will use fluticasone 2 puffs inhaled daily    Sjogren's syndrome ([address] [facility])  Assessment & Plan  · Patient takes Namenda which she takes for this syndrome and she will continue that  · She takes another medicine COOK CT URA that we do not have and no one is able to bring and  · She takes KALRA CT IN that we do not have here at this pharmacy and she has no one to bring     Overactive bladder  Assessment & Plan  · She will continue on her home dose of Ditropan 5 mg p.o. twice daily    Hypothyroidism  Assessment & Plan  · Patient has hypothyroidism her TSH level today was 7.002  · She will continue on her home dose of levothyroxine 125 mcg p.o. daily  · T4 is 0.84 from today    Benign essential hypertension  Assessment & Plan  · Patient will continue with her home regimen  · Amlodipine 5 mg p.o. daily  · Lasix 20 mg p.o. daily  · Losartan 50 mg p.o. daily  · Metoprolol 25 mg p.o. every 12 hours  · We will monitor her blood pressure closely and we will add or delete agents as needed    Yan's esophagus determined by biopsy  Assessment & Plan  · Patient will continue with her Prilosec 20 mg p.o. daily           Counseling / Coordination of Care Time: 1 hour. Greater than 50% of total time spent on patient counseling and coordination of care. Collaboration of Care: Were Recommendations Directly Discussed with Primary Treatment Team? - No     History of Present Illness:    Edna Rodrigez is a 47 y.o. female who is originally admitted to the psychiatry service due to intentional suicide attempt with Flexeril we are consulted for medical clearance for admission to [facility] and treatment of underlying psychiatric illness. 9/26/2023 patient called 911 from her home and was taken to [facility] ER and was admitted for an intentional overdose Flexeril she had second thoughts and called 911. She signed a 201 form for voluntary commitment to inpatient psych hospital.  The control team had to be activated twice as patient tried to elope from the hospital she has since been transferred to [facility] for continuation of psychiatric treatment. Patient has a long complicated medical history hypertension hyperlipidemia GERD hypothyroidism Sjogren's longstanding psych history hypothyroidism COPD overactive bladder migraines. On evaluation patient denies any physical complaints such as headache, dizziness, chest pain, shortness of breath, nausea/vomiting/diarrhea at this time. Inpatient admission for psychiatric evaluation. Labs ECG were reviewed. Review of Systems:    Review of Systems   Constitutional: Positive for fatigue. Negative for chills and fever.    HENT: Negative for ear pain and sore throat. Eyes: Negative for pain and visual disturbance. Respiratory: Negative for cough and shortness of breath. Cardiovascular: Negative for chest pain and palpitations. Gastrointestinal: Negative for abdominal pain and vomiting. Genitourinary: Positive for frequency. Negative for dysuria and hematuria. Musculoskeletal: Negative for arthralgias and back pain. Skin: Positive for rash. Negative for color change. Neurological: Negative for seizures and syncope. Psychiatric/Behavioral: The patient is nervous/anxious. All other systems reviewed and are negative. Past Medical and Surgical History:     Past Medical History:   Diagnosis Date   • Anxiety    • Arthritis     oa cassandra knees   • Asthma     good control- no medications   • Yan's esophagus    • Bipolar affective disorder ([address])    • Cannabis abuse with unspecified 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[address])    • Chronic pain disorder     lumbar   • COPD (chronic obstructive pulmonary disease) ([facility])    • CPAP (continuous positive airway pressure) dependence    • Degenerative disc disease at L5-S1 level    • Deliberate self-cutting     9/15/22per pt has not done recently-- does see a therapist and psychiatrist regularly   • Depression 09/16/2008   • Disease of thyroid gland     hypo   • MARTINEZ (dyspnea on exertion)     per pt "has had this with exertion and not new"   • Drug overdose 10/28/2008    suicide attempt and again drug overdose 7/2022--[facility]Medical floor and than transferred to [facility]   • Dysphagia    • Dyspnea    • Edema     BLE   • Family history of blood clots    • GERD (gastroesophageal reflux disease)    • Headache(784.0)    • Headache, tension-type    • High blood pressure    • History of COVID-19 12/30/2020    Symptoms started on 12/30/2020 and then got worse. Today she is feeling a little bit better.   She is a candidate for monoclonal antibody infusion and set for 1/6/21 @ 1pm at [facility]. 01/07/21 - telemedicine -    • Hyperlipidemia    • Hypertension    • Knee pain, bilateral     Especially right   • Medical marijuana use    • Memory loss    • Migraines    • Obesity    • Overactive bladder    • Sjogren's disease ([address])    • Sleep apnea    • Stress incontinence    • Suicidal ideations    • Teeth missing    • Tremor     per pt Tremors of Right Leg and both Arms   • Visual impairment    • Wears glasses        Past Surgical History:   Procedure Laterality Date   • BREAST CYST EXCISION Right 1989   • CARPAL TUNNEL RELEASE Left    • CHOLECYSTECTOMY  05/2003    Laparoscopic   • COLONOSCOPY      01/12/2009   • DILATION AND CURETTAGE OF UTERUS     • ELBOW SURGERY Left     x2.  No hardware   • ESOPHAGOGASTRODUODENOSCOPY     • FOOT SURGERY Left     Plantar fasciotomy   • HERNIA REPAIR     • HYSTERECTOMY      laporoscopic, partial   • KNEE ARTHROSCOPY Bilateral    • OOPHORECTOMY Left 10/2015   • FL GASTROCNEMIUS RECESSION Left 02/24/2021    Procedure: RECESSION GASTROC OPEN;  Surgeon: Sakshi Velasquez DPM;  Location: [address] [place] [address] OR;  Service: Podiatry   • FL RPR UMBILICAL HRNA 5 YRS/> REDUCIBLE N/A 04/24/2019    Procedure: REPAIR HERNIA UMBILICAL LAPAROSCOPIC W/ ROBOTICS;  Surgeon: Sandy Malave MD;  Location: [facility] OR;  Service: General   • FL SPHINCTEROTOMY ANAL DIVISION SPHINCTER [facility] N/A 08/31/2018    Procedure: EUA, LEFT PARTIAL INTERNAL SPHINCTEROTOMY;  Surgeon: Harvinder Neville MD;  Location: [address] [place] [address] OR;  Service: Colorectal   • FL TNOT ELBOW LATERAL/MEDIAL DEBRIDE OPEN TDN RPR Left 09/20/2022    Procedure: RELEASE EPICONDYLAR ELBOW MEDIAL;  Surgeon: Marbella Duran MD;  Location: [facility] OR;  Service: Orthopedics   • REDUCTION MAMMAPLASTY Bilateral 1999   • REPAIR LACERATION Left     left hand-5/18/2009   • REPLACEMENT TOTAL KNEE Right    • ROTATOR CUFF REPAIR Left    • TONSILECTOMY AND ADNOIDECTOMY     • US GUIDED MSK PROCEDURE  04/22/2021   • VASCULAR SURGERY     • VEIN LIGATION Bilateral    • WISDOM TOOTH EXTRACTION Meds/Allergies:    all medications and allergies reviewed    Allergies: Allergies   Allergen Reactions   • Percocet [Oxycodone-Acetaminophen] Headache     Severe headaches   (denies issues with Tylenol)   • Povidone Iodine Rash     Unsure if betadine or gauze post surgical. Got rash on leg. Has  Had itchy rashes after every surgery prep and IV insertion   • Medical Tape Hives   • Chlorhexidine Rash     Per pt "able to use the liquid soap--thinkd reaction from the sponges or wipes"       Social History:     Marital Status: Single    Substance Use History:   Social History     Substance and Sexual Activity   Alcohol Use Not Currently    Comment: Recovering alcoholic     Social History     Tobacco Use   Smoking Status Former   • Packs/day: 2.00   • Years: 30.00   • Total pack years: 60.00   • Types: Cigarettes   • Start date: 1985   • Quit date: 2018   • Years since quittin.7   Smokeless Tobacco Never   Tobacco Comments    Started at age 14. Social History     Substance and Sexual Activity   Drug Use Yes   • Types: Marijuana, Methamphetamines    Comment: Trying to get off meth       Family History:    non-contributory    Physical Exam:     Vitals:   Blood Pressure: 142/77 (23 1410)  Pulse: 96 (23 1411)  Temperature: 98.6 °F (37 °C) (23 0753)  Temp Source: Temporal (23 0753)  Respirations: 16 (23 0753)  Height: 5' 6" (167.6 cm) (23)  Weight - Scale: 132 kg (291 lb 10.7 oz) (23)  SpO2: 93 % (23 0753)    Physical Exam  Constitutional:       General: She is not in acute distress. Appearance: Normal appearance. She is not ill-appearing. HENT:      Head: Normocephalic and atraumatic. Nose: Nose normal.      Mouth/Throat:      Mouth: Mucous membranes are moist.   Eyes:      Extraocular Movements: Extraocular movements intact. Cardiovascular:      Rate and Rhythm: Normal rate and regular rhythm.       Heart sounds: Normal heart sounds. Pulmonary:      Breath sounds: Normal breath sounds. No wheezing. Abdominal:      General: Abdomen is flat. Bowel sounds are normal.      Palpations: Abdomen is soft. Skin:     General: Skin is warm and dry. Capillary Refill: Capillary refill takes more than 3 seconds. Neurological:      Mental Status: She is alert and oriented to person, place, and time. Additional Data:     Lab Results: I have personally reviewed pertinent reports. Results from last 7 days   Lab Units 09/29/23  0442   WBC Thousand/uL 8.72   HEMOGLOBIN g/dL 13.2   HEMATOCRIT % 41.8   PLATELETS Thousands/uL 161   NEUTROS PCT % 62   LYMPHS PCT % 25   MONOS PCT % 10   EOS PCT % 1     Results from last 7 days   Lab Units 09/29/23  0442   SODIUM mmol/L 139   POTASSIUM mmol/L 3.8   CHLORIDE mmol/L 108   CO2 mmol/L 21   BUN mg/dL 12   CREATININE mg/dL 0.58*   ANION GAP mmol/L 10   CALCIUM mg/dL 8.4   ALBUMIN g/dL 3.5   TOTAL BILIRUBIN mg/dL 0.29   ALK PHOS U/L 44   ALT U/L 17   AST U/L 15   GLUCOSE RANDOM mg/dL 97     Results from last 7 days   Lab Units 09/28/23  0552   INR  2.35*         Lab Results   Component Value Date/Time    HGBA1C 5.0 01/05/2023 11:53 AM    HGBA1C 5.2 02/22/2022 08:28 AM    HGBA1C 4.9 12/18/2020 09:07 AM           EKG, Pathology, and Other Studies Reviewed on Admission:   EKG 9/26/2023 twelve-lead EKG reviewed by myself as well as interpreted by myself ventricular rate 80 QT interval 358 QTc 412 normal sinus rhythm normal EKG when compared with an EKG of September 20, 2023 there is no new acute EKG findings  ** Please Note: This note has been constructed using a voice recognition system.  **    I have spent a total time of 60 minutes on 09/29/23 in caring for this patient including Risks and benefits of tx options, Instructions for management, Patient and family education, Importance of tx compliance, Risk factor reductions, Impressions, Counseling / Coordination of care, Documenting in the medical record, Reviewing / ordering tests, medicine, procedures  , Obtaining or reviewing history   and Communicating with other healthcare professionals .

## 2023-09-29 NOTE — ASSESSMENT & PLAN NOTE
· Vital signs stable afebrile patient seen and examined by myself Labs from today 9/29/2023 reviewed sodium 138 potassium 3.8 creatinine 0.58 TSH 7.02  · Patient medically cleared for admit  · [facility] IM will sign off please call with any questions or concerns

## 2023-09-29 NOTE — NURSING NOTE
Patient is a 201 admission from [facility]. Patient reports that she became overwhelmed with her medical problems and starting a new job so she took 25 10 mg tablets of Flexaril and then called 911. She reports that this is her fifth suicide attempt and that she has attempted overdose and cutting her wrists in the past. She currently endorses anxiety and depression, that has gotten worse since her mother's death in 2021, and is able to contract for safety. Patient reports having hand tremors d/t detoxing from methamphetamines and reports being drug free for 27 days. She reports currently recovering from a PE that she was diagnosed with on 9/3/2023. She has bruising on her back, left arm, and lower abdomen that she reports is from taking anticoagulants. She has +2 pitting edema in her bilateral lower extremities. She currently has no complaints of pain. Patient was encouraged to inform staff of any needs or concerns.

## 2023-09-29 NOTE — ASSESSMENT & PLAN NOTE
· Patient will continue with her home regimen  · Amlodipine 5 mg p.o. daily  · Lasix 20 mg p.o. daily  · Losartan 50 mg p.o. daily  · Metoprolol 25 mg p.o. every 12 hours  · We will monitor her blood pressure closely and we will add or delete agents as needed

## 2023-09-29 NOTE — ASSESSMENT & PLAN NOTE
· Patient will have an albuterol metered-dose inhaler 2 puffs every 4 as needed cough shortness of breath  · she will use fluticasone 2 puffs inhaled daily

## 2023-09-29 NOTE — PROGRESS NOTES
09/29/23 1100   Activity/Group Checklist   Group Life Skills  (topic types of happiness.)   Attendance Attended   Attendance Duration (min) 31-45   Interactions Interacted appropriately  (jovial and laughing at  when an error was made on the white board. Pt. stated pride in her sense of humor as a coing skill.)   Affect/Mood Appropriate;Bright   Goals Achieved Able to engage in interactions; Discussed coping strategies

## 2023-09-29 NOTE — TREATMENT TEAM
Pt attended 1 am group. Pt elected not to engage in group discussion and declined. Pt in afternoon apologized for being late stating she was having a procedure done. 09/29/23 1000   Activity/Group Checklist   Group Community meeting   Attendance Attended   Attendance Duration (min) 31-45   Interactions Other (Comment)  (scant, dismissed talking)   Affect/Mood Constricted   Goals Achieved Identified feelings; Discussed coping strategies; Able to listen to others; Able to engage in interactions; Able to reflect/comment on own behavior;Able to manage/cope with feelings;Verbalized increased hopefulness; Able to self-disclose; Able to recieve feedback; Increased hopefulness

## 2023-09-29 NOTE — ASSESSMENT & PLAN NOTE
· Patient's current BMI 47.08  · Patient needs lifestyle modification  · Consider once outpatient a rolling and a weight loss management program

## 2023-09-29 NOTE — CASE MANAGEMENT
CM sent a secure e-mail to the patient's [facility] CM, Ike Rater, requesting the patient's most recent medication list. CM awaiting response.

## 2023-09-29 NOTE — TREATMENT PLAN
TREATMENT PLAN REVIEW - [address] [facility] 47 y.o. 1970 female MRN: [id number]    [address]: Celestine Connors [facility] 477-58 Encounter: [id number]          Admit Date/Time:  9/28/2023  7:04 PM    Treatment Team: Attending Provider: Valentin Valle MD; Patient Care Assistant: Christy Simmons; Patient Care Assistant: Brenda Geiger; Registered Nurse: Bentley Roberts RN; Patient Care Assistant: Travis Payne; : Malini King LMSW; Occupational Therapy Assistant: Jerry Doyle; Nurse Manager: Dilcia Briones RN; Licensed Practical Nurse:  Mis Ryan LPN    Diagnosis: Principal Problem:    Schizoaffective disorder, bipolar type ([address])  Active Problems:    Borderline personality disorder ([address])    Polysubstance abuse (HCC)    MAG (obstructive sleep apnea)      Patient Strengths/Assets: communication skills, family ties, patient is on a voluntary commitment, well known to the [facility]    Patient Barriers/Limitations: difficulty adapting, noncompliant with medication, poor physical health, poor reasoning ability, substance abuse    Short Term Goals: decrease in depressive symptoms, decrease in psychotic symptoms, decrease in suicidal thoughts, decrease in self abusive behaviors, decrease in level of agitation, ability to stay safe on the unit, ability to stay free of restraints, improvement in insight, improvement in reality testing, mood stabilization    Long Term Goals: resolution of depressive symptoms, stabilization of mood, free of suicidal thoughts, free of homicidal thoughts, no self abusive behavior, resolution of psychotic symptoms, improvement in reality testing, improved insight, acceptance of need for psychiatric treatment, acceptance of need for psychiatric follow up after discharge    Progress Towards Goals: starting psychiatric medications as prescribed    Recommended Treatment: medication management, patient medication education, group therapy, milieu therapy, continued Behavioral Health psychiatric evaluation/assessment process    Treatment Frequency: daily medication monitoring, group and milieu therapy daily, monitoring through interdisciplinary rounds, monitoring through weekly patient care conferences    Expected Discharge Date:  14 days    Discharge Plan: referral for outpatient psychotherapy, follow up with outpatient drug and alcohol rehabilitation program, follow up with partial hospitalization program and outpatient psychiatrist, return to previous living arrangement    Treatment Plan Created/Updated By: Jesse Meadows MD

## 2023-09-29 NOTE — TREATMENT TEAM
09/29/23 0726   Team Meeting   Meeting Type Daily Rounds   Initial Conference Date 09/29/23   Team Members Present   Team Members Present Physician;Nurse;;; Occupational Therapist   Physician Team Member Dr. Whalen Spotted, [facility]   Nursing Team Member Aamir Starks, [address] [facility] Management Team Member [address] [place] [address] Work Team Member Claudia   OT Team Member Lj Lopez   Patient/Family Present   Patient Present No   Patient's Family Present No     New 201 admit from last night due to a suicide attempt w/ OD on medication. Called EMS on herself. Patient admitted from [address]. Hx of past suicide attempts, hx of methamphetamine use. Medication compliant.

## 2023-09-29 NOTE — ASSESSMENT & PLAN NOTE
· At home she takes CO LES TIP OL for her hyperlipidemia  · That is nonformulary at this institution  · She is going to just wait and take it when she gets home she does not want to try another agent

## 2023-09-29 NOTE — ASSESSMENT & PLAN NOTE
· Patient has hypothyroidism her TSH level today was 7.002  · She will continue on her home dose of levothyroxine 125 mcg p.o. daily  · T4 is 0.84 from today

## 2023-09-29 NOTE — ASSESSMENT & PLAN NOTE
· Patient takes Namenda which she takes for this syndrome and she will continue that  · She takes another medicine COOK CT URA that we do not have and no one is able to bring and  · She takes KARLA CT IN that we do not have here at this pharmacy and she has no one to bring

## 2023-09-29 NOTE — CMS CERTIFICATION NOTE
Certification: Based upon physical, mental and social evaluations, I certify that inpatient psychiatric services are medically necessary for this patient for a duration of 21 midnights for the treatment of Schizoaffective disorder, bipolar type [facility])    Available alternative community resources do not meet the patient's mental health care needs. I further attest that an established written individualized plan of care has been implemented and is outlined in the patient's medical records.

## 2023-09-30 LAB
INR PPP: 1.83 (ref 0.84–1.19)
PROTHROMBIN TIME: 21.5 SECONDS (ref 11.6–14.5)

## 2023-09-30 PROCEDURE — 85610 PROTHROMBIN TIME: CPT | Performed by: NURSE PRACTITIONER

## 2023-09-30 PROCEDURE — 99232 SBSQ HOSP IP/OBS MODERATE 35: CPT | Performed by: STUDENT IN AN ORGANIZED HEALTH CARE EDUCATION/TRAINING PROGRAM

## 2023-09-30 RX ORDER — WARFARIN SODIUM 5 MG/1
5 TABLET ORAL
Status: COMPLETED | OUTPATIENT
Start: 2023-09-30 | End: 2023-10-01

## 2023-09-30 RX ORDER — CLONIDINE HYDROCHLORIDE 0.1 MG/1
0.1 TABLET ORAL EVERY 12 HOURS SCHEDULED
Status: DISCONTINUED | OUTPATIENT
Start: 2023-09-30 | End: 2023-10-03

## 2023-09-30 RX ORDER — NALTREXONE HYDROCHLORIDE 50 MG/1
50 TABLET, FILM COATED ORAL DAILY
Status: DISCONTINUED | OUTPATIENT
Start: 2023-10-01 | End: 2023-10-06 | Stop reason: HOSPADM

## 2023-09-30 RX ADMIN — BUPROPION HYDROCHLORIDE 300 MG: 300 TABLET, EXTENDED RELEASE ORAL at 09:27

## 2023-09-30 RX ADMIN — CHOLECALCIFEROL TAB 25 MCG (1000 UNIT) 1000 UNITS: 25 TAB at 09:28

## 2023-09-30 RX ADMIN — PANTOPRAZOLE SODIUM 20 MG: 20 TABLET, DELAYED RELEASE ORAL at 05:55

## 2023-09-30 RX ADMIN — LOSARTAN POTASSIUM 50 MG: 50 TABLET, FILM COATED ORAL at 09:27

## 2023-09-30 RX ADMIN — TOPIRAMATE 200 MG: 100 TABLET, FILM COATED ORAL at 09:27

## 2023-09-30 RX ADMIN — TOPIRAMATE 200 MG: 100 TABLET, FILM COATED ORAL at 17:04

## 2023-09-30 RX ADMIN — FUROSEMIDE 20 MG: 20 TABLET ORAL at 09:28

## 2023-09-30 RX ADMIN — HYDROXYZINE HYDROCHLORIDE 50 MG: 50 TABLET, FILM COATED ORAL at 13:07

## 2023-09-30 RX ADMIN — DIVALPROEX SODIUM 750 MG: 250 TABLET, DELAYED RELEASE ORAL at 09:28

## 2023-09-30 RX ADMIN — MEMANTINE 10 MG: 10 TABLET ORAL at 09:28

## 2023-09-30 RX ADMIN — ESTRADIOL 0.5 MG: 1 TABLET ORAL at 09:27

## 2023-09-30 RX ADMIN — LEVOTHYROXINE SODIUM 125 MCG: 125 TABLET ORAL at 05:55

## 2023-09-30 RX ADMIN — NALTREXONE HYDROCHLORIDE 25 MG: 50 TABLET, FILM COATED ORAL at 09:27

## 2023-09-30 RX ADMIN — OXYBUTYNIN CHLORIDE 5 MG: 5 TABLET ORAL at 17:05

## 2023-09-30 RX ADMIN — OXYBUTYNIN CHLORIDE 5 MG: 5 TABLET ORAL at 09:42

## 2023-09-30 RX ADMIN — AMLODIPINE BESYLATE 5 MG: 5 TABLET ORAL at 09:28

## 2023-09-30 RX ADMIN — OLANZAPINE 5 MG: 5 TABLET, FILM COATED ORAL at 07:41

## 2023-09-30 RX ADMIN — METOPROLOL TARTRATE 25 MG: 25 TABLET, FILM COATED ORAL at 09:28

## 2023-09-30 RX ADMIN — WARFARIN SODIUM 5 MG: 5 TABLET ORAL at 17:04

## 2023-09-30 RX ADMIN — CLONIDINE HYDROCHLORIDE 0.1 MG: 0.1 TABLET ORAL at 14:18

## 2023-09-30 NOTE — PROGRESS NOTES
Progress Note - [facility] Marilyn 47 y.o. female MRN: [id number]  Unit/Bed#: [facility] 094-09 Encounter: [id number]       Patient was visited on unit for continuing care; chart reviewed and discussed with the nursing staff. On approach, the patient was calm and cooperative. She reported better mood since being in the hospital but continues to report poor frustration tolerance. She was resentful about her behavior earlier this morning when she acted out as her knee was not met immediately. She talked about her parents and became tearful was talking about her belated mother. She noted that her 77-year-old father lives alone and she has been closer to him since her mother passed away. She has a brother who is dealing with his own issues and is not very involved taking care of their father. She noted that she feels bad not being there more for your father. Denied any changes in appetite, and energy level. No problem initiating and maintaining sleep. Denied A/VH currently. Denied SI/HI, intent or plan upon direct inquiry at this time. Patient continues to be selectively interactive with staff and peers. She continues to get frustrated easily especially when her mom needs not being met immediately. She received hydroxyzine 5 mg p.o. as needed at 741 this morning for agitation and Atarax 50 mg as needed at 1307 for anxiety. As per Frances Calderon RN's note this morning: "Patient with increasing agitation at start of shift. She was heard making noises in her room and was found sitting bedside with her hands over her face sobbing. Upon assessment she became irritable reporting "all I wanted was a towel to wash up so I don't smell."  Patient was re-educated on unit procedures and was not accepting as it was during vital signs and change of shift and her cooperation was needed.   Patient assured she would get what she needed for hygiene but shouted at this nurse and she was instructed to lower voice and be respectful and she said ok. This nurse left patient to review chart to see if there were any PRNs available and a loud noise was heard in patient room and upon return to room patient bedside drawers were pushed sideways against wall and patient began yelling "all I want is something for anxiety that the  said I could get."  Patient reminded that she did not ask for anything for anxiety and that her chart was being reviewed. Patient not accepting of redirection to leave room for 15 minutes with less stimulation. Security was called to assist with moving patient to another area for de-escalation as she was not cooperative. Patient given 5mg Zyprexa PO @ 1482."    Patient accepted all offered medications and reported feeling better. No adverse effects of medications noted or reported. The patient received Haldol Dec 50 mg IM on 9/27/23. Maintained on Wellbutrin  mg daily, Depakote 750 mg nightly and Topamax 200 mg BID. She was started on Naltrexone 25 mg daily for impulsivity which uptitrated to 50 mg daily. Also, was started on Clonidine 0.1 mg BID for impulsivity and poor frustration tolerance. Trough VPA level to be checked on 10/2/23. I called the ACT team and left a VM on 9/29/23; list of most recent meds to be confirmed by the ACT team, and doses to be adjusted as indicated.      Current Mental Status Evaluation:  Appearance and attitude: appeared as stated age, dressed in hospital attire, overweight, tattooed, with good hygiene  Eye contact: good  Motor Function: within normal limits, intact gait, No PMA/PMR  Gait/station: normal gait/station and normal balance  Speech: normal for rate, rhythm, volume, latency, amount  Language: No overt abnormality  Mood/affect: dysphoric / Affect was constricted, mood-congruent  Thought Processes: linear, concrete  Thought content: denied suicidal ideations or homicidal ideations, no overt delusions elicited  Associations: concrete associations  Perceptual disturbances: denies Auditory/Visual/Tactile Hallucinations  Orientation: oriented to time, person, place and to the situational context  Cognitive Function: intact  Memory: recent and remote memory grossly intact  Intellect: unable to assess  Fund of knowledge: aware of current events, aware of past history and vocabulary average  Impulse control: impulsive at times  Insight/judgment: fair/fair    Vital signs in last 24 hours:    Temp:  [96.2 °F (35.7 °C)-97.6 °F (36.4 °C)] 96.2 °F (35.7 °C)  HR:  [62-96] 62  Resp:  [16-17] 17  BP: (122-162)/(77-84) 143/84    Laboratory results: I have personally reviewed all pertinent laboratory/tests results    Results from the past 24 hours:   Recent Results (from the past 24 hour(s))   Protime-INR    Collection Time: 09/29/23  7:00 PM   Result Value Ref Range    Protime 19.5 (H) 11.6 - 14.5 seconds    INR 1.62 (H) 0.84 - 1.19   Protime-INR    Collection Time: 09/30/23  5:40 AM   Result Value Ref Range    Protime 21.5 (H) 11.6 - 14.5 seconds    INR 1.83 (H) 0.84 - 1.19       Progress Toward Goals: minimal improvement    Assessment:  Principal Problem:    Schizoaffective disorder, bipolar type (HCC)  Active Problems:    Borderline personality disorder (HCC)    Polysubstance abuse (HCC)    Yan's esophagus determined by biopsy    Benign essential hypertension    Hypothyroidism    MAG (obstructive sleep apnea)    Overactive bladder    Sjogren's syndrome (HCC)    COPD (chronic obstructive pulmonary disease) (HCC)    Mixed hyperlipidemia    Medical clearance for psychiatric admission    Chronic tension-type headache, intractable    Contusion of elbow    History of pulmonary embolism    Contact dermatitis and eczema    Morbid obesity with BMI of 45.0-49.9, adult (HCC)    Restless leg syndrome        Plan:  - f/u SLIM recs regarding the medical problems  - Expand collat information from the ACT  - Trough VPA level on 10/2/23  - Continue medication titration and treatment plan; adjust medication to optimize treatment response and as clinically indicated.      Scheduled medications:  Current Facility-Administered Medications   Medication Dose Route Frequency Provider Last Rate   • albuterol  2 puff Inhalation Q4H PRN JHONATAN Berg     • amLODIPine  5 mg Oral Daily JHONATAN Berg     • buPROPion  300 mg Oral Daily JHONATAN Casillas     • cholecalciferol  1,000 Units Oral Daily JHONATAN Casillas     • cloNIDine  0.1 mg Oral Q12H [facility] Carri Gonzales MD     • cyproheptadine  4 mg Oral TID PRN JHONATAN Berg     • Diclofenac Sodium  2 g Topical 4x Daily PRN JHONATAN Berg     • divalproex sodium  750 mg Oral Q12H [facility] JHONATAN Casillas     • estradiol  0.5 mg Oral Daily JHONATAN Berg     • furosemide  20 mg Oral Daily JHONATAN Berg     • hydrocortisone   Topical 4x Daily PRN JHONATAN Berg     • hydrOXYzine HCL  100 mg Oral Q6H PRN Carri Gonzales MD     • hydrOXYzine HCL  25 mg Oral Q6H PRN Max 4/day JHONATAN Casillas     • hydrOXYzine HCL  50 mg Oral Q6H PRN Max 4/day JHONATAN Casillas     • ibuprofen  200 mg Oral Q6H PRN JHONATAN Casillas     • ibuprofen  400 mg Oral Q6H PRN JHONATAN Casillas     • ibuprofen  600 mg Oral Q8H PRN JHONATAN Casillas     • levothyroxine  125 mcg Oral Early Morning JHONATAN Berg     • LORazepam  1 mg Intramuscular Q6H PRN Max 3/day JHONATAN Casillas     • losartan  50 mg Oral Daily JHONATAN Berg     • memantine  10 mg Oral Daily JHONATAN Berg     • metoprolol tartrate  25 mg Oral Q12H [facility] JHONATAN Berg     • [START ON 10/1/2023] naltrexone  50 mg Oral Daily Carri Gonzales MD     • OLANZapine  5 mg Intramuscular Q3H PRN Max 3/day Max Mayes, CRNP     • OLANZapine  2.5 mg Oral Q4H PRN Max 6/day JHONATAN Casillas     • OLANZapine  5 mg Oral Q4H PRN Max 3/day JHONATAN Casillas • OLANZapine  5 mg Oral Q3H PRN Max 3/day JHONATAN Cuevas     • oxybutynin  5 mg Oral BID JHONATAN Berg     • pantoprazole  20 mg Oral Early Morning JHONATAN Berg     • polyethylene glycol  17 g Oral Daily PRN JHONATAN Cuevas     • rOPINIRole  1 mg Oral HS PRN JHONATAN Deal     • senna-docusate sodium  1 tablet Oral Daily PRN JHONATAN Cuevas     • topiramate  200 mg Oral BID JHONATAN Cuevas     • traZODone  50 mg Oral HS PRN JHONATAN Cuevas     • warfarin  5 mg Oral Daily (warfarin) JHONATAN Berg          PRN:  •  albuterol  •  cyproheptadine  •  Diclofenac Sodium  •  hydrocortisone  •  hydrOXYzine HCL  •  hydrOXYzine HCL  •  hydrOXYzine HCL  •  ibuprofen  •  ibuprofen  •  ibuprofen  •  LORazepam  •  OLANZapine  •  OLANZapine  •  OLANZapine  •  OLANZapine  •  polyethylene glycol  •  rOPINIRole  •  senna-docusate sodium  •  traZODone    - Observation: routine    - VS: as per unit protocol  - Diet: Regular diet  - Psychoeducation (benefits and potential risks) discussed, importance of compliance with the psychiatric treatment reiterated, and the patient verbalized understanding of the matter  - Encourage group attendance and milieu therapy    - The pt was educated and agreed to verbalize any suicidal thoughts, frustrations or concerns to the nursing staff, immediately.     - Dispo: TBD       Next of Kin  · Extended Emergency Contact Information  · Primary Emergency Contact: Luigi Mason  · Address: [address] [place] [address]  ·          [facility] [address]  · Home Phone: [phone]  · Mobile Phone: [phone]  · Relation: Father  · Secondary Emergency Contact: Leonor Ji  · Address: [address] [place] [address]  ·          [address]  · Home Phone: [phone]  · Mobile Phone: [phone]  · Relation: Friend      Counseling / Coordination of Care  Patient's progress reviewed with nursing staff. Medications, treatment progress and treatment plan reviewed with patient. Medication changes discussed with patient. Medication education provided to patient. Supportive therapy provided to patient. Cognitive techniques utilized during the session. Reassurance and supportive therapy provided. Reoriented to reality and reassured. Encouraged participation in milieu and group therapy on the unit. Crisis/safety plan discussed with patient.      Roel Schmidt MD  Attending [address] [place] [address] Drive

## 2023-09-30 NOTE — PLAN OF CARE
Problem: INFECTION - ADULT  Goal: Absence or prevention of progression during hospitalization  Description: INTERVENTIONS:  - Assess and monitor for signs and symptoms of infection  - Monitor lab/diagnostic results  - Monitor all insertion sites, i.e. indwelling lines, tubes, and drains  - Monitor endotracheal if appropriate and nasal secretions for changes in amount and color  - [place] appropriate cooling/warming therapies per order  - Administer medications as ordered  - Instruct and encourage patient and family to use good hand hygiene technique  - Identify and instruct in appropriate isolation precautions for identified infection/condition  Outcome: Progressing  Goal: Absence of fever/infection during neutropenic period  Description: INTERVENTIONS:  - Monitor WBC    Outcome: Progressing     Problem: SAFETY ADULT  Goal: Patient will remain free of falls  Description: INTERVENTIONS:  - Educate patient/family on patient safety including physical limitations  - Instruct patient to call for assistance with activity   - Consult OT/PT to assist with strengthening/mobility   - Keep Call bell within reach  - Keep bed low and locked with side rails adjusted as appropriate  - Keep care items and personal belongings within reach  - Initiate and maintain comfort rounds  - Make Fall Risk Sign visible to staff  - Apply yellow socks and bracelet for high fall risk patients  - Consider moving patient to room near nurses station  Outcome: Progressing  Goal: Maintain or return to baseline ADL function  Description: INTERVENTIONS:  -  Assess patient's ability to carry out ADLs; assess patient's baseline for ADL function and identify physical deficits which impact ability to perform ADLs (bathing, care of mouth/teeth, toileting, grooming, dressing, etc.)  - Assess/evaluate cause of self-care deficits   - Assess range of motion  - Assess patient's mobility; develop plan if impaired  - Assess patient's need for assistive devices and provide as appropriate  - Encourage maximum independence but intervene and supervise when necessary  - Involve family in performance of ADLs  - Assess for home care needs following discharge   - Consider OT consult to assist with ADL evaluation and planning for discharge  - Provide patient education as appropriate  Outcome: Progressing  Goal: Maintains/Returns to pre admission functional level  Description: INTERVENTIONS:  - Perform BMAT or MOVE assessment daily.   - Set and communicate daily mobility goal to care team and patient/family/caregiver. - Collaborate with rehabilitation services on mobility goals if consulted  - Out of bed for toileting  - Record patient progress and toleration of activity level   Outcome: Progressing     Problem: Ineffective Coping  Goal: Cooperates with admission process  Description: Interventions:   - Complete admission process  Outcome: Progressing  Goal: Identifies ineffective coping skills  Outcome: Progressing  Goal: Identifies healthy coping skills  Outcome: Progressing  Goal: Demonstrates healthy coping skills  Outcome: Progressing  Goal: Understands least restrictive measures  Description: Interventions:  - Utilize least restrictive behavior  Outcome: Progressing     Problem: Anxiety  Goal: Anxiety is at manageable level  Description: Interventions:  - Assess and monitor patient's anxiety level. - Monitor for signs and symptoms (heart palpitations, chest pain, shortness of breath, headaches, nausea, feeling jumpy, restlessness, irritable, apprehensive). - Collaborate with interdisciplinary team and initiate plan and interventions as ordered.   - [place] patient to unit/surroundings  - Explain treatment plan  - Encourage participation in care  - Encourage verbalization of concerns/fears  - Identify coping mechanisms  - Assist in developing anxiety-reducing skills  - Administer/offer alternative therapies  - Limit or eliminate stimulants  Outcome: Progressing Problem: DISCHARGE PLANNING - CARE MANAGEMENT  Goal: Discharge to post-acute care or home with appropriate resources  Description: INTERVENTIONS:  - Conduct assessment to determine patient/family and health care team treatment goals, and need for post-acute services based on payer coverage, community resources, and patient preferences, and barriers to discharge  - Address psychosocial, clinical, and financial barriers to discharge as identified in assessment in conjunction with the patient/family and health care team  - Arrange appropriate level of post-acute services according to patient’s   needs and preference and payer coverage in collaboration with the physician and health care team  - Communicate with and update the patient/family, physician, and health care team regarding progress on the discharge plan  - Arrange appropriate transportation to post-acute venues  Outcome: Progressing

## 2023-09-30 NOTE — TREATMENT TEAM
09/30/23 1307   Dillon Anxiety Scale   Anxious Mood 3   Tension 3   Fears 2   Insomnia 0   Intellectual 2   Depressed Mood 2   Somatic Complaints: Muscular 0   Somatic Complaints: Sensory 0   Cardiovascular Symptoms 0   Respiratory Symptoms 0   Gastrointestinal Symptoms 0   Genitourinary Symptoms 0   Autonomic Symptoms 3   Behavior at Interview 3   Dillon Anxiety Score 18       Patient asked for and was given 50mg Atarax for moderate anxiety. She reports to the nursing station tearful and shaking that she was making a telephone call to her father and something went wrong with the telephone and her father was not able to hear her and that this did not happen yesterday with the telephone. Patient was assisted with making the telephone call to her father from the telephone near the nursing station.

## 2023-09-30 NOTE — NURSING NOTE
Patient visible on the unit sitting with peers in the dayroom for the majority of the shift, bright and social with staff and peers. Patient cooperative with assessment questions on approach, reporting "better" but moderate depression and anxiety, denies SI/HI/AVH. Patient compliant with medications and meals, appetite fair. No further signs of distress noted.

## 2023-09-30 NOTE — NURSING NOTE
Patient's agitation has resolved and has eaten breakfast, showered and actively participating in morning art therapy. She reports she is sorry for her earlier behavior and will work on "my short temper and anxiety". Safety plan reviewed again with patient and she is visible on the millieu and sociable with select peers.

## 2023-09-30 NOTE — NURSING NOTE
Patient calm, cooperative, and sociable with select peers. She is able to make needs known and reports sleeping well. She denies anxiety and depression at this time and reports no HI/SI/AVH. Patient takes medications as scheduled.

## 2023-09-30 NOTE — NURSING NOTE
Patient with increasing agitation at start of shift. She was heard making noises in her room and was found sitting bedside with her hands over her face sobbing. Upon assessment she became irritable reporting "all I wanted was a towel to wash up so I don't smell."  Patient was re-educated on unit procedures and was not accepting as it was during vital signs and change of shift and her cooperation was needed. Patient assured she would get what she needed for hygiene but shouted at this nurse and she was instructed to lower voice and be respectful and she said ok. This nurse left patient to review chart to see if there were any PRNs available and a loud noise was heard in patient room and upon return to room patient bedside drawers were pushed sideways against wall and patient began yelling "all I want is something for anxiety that the  said I could get."  Patient reminded that she did not ask for anything for anxiety and that her chart was being reviewed. Patient not accepting of redirection to leave room for 15 minutes with less stimulation. Security was called to assist with moving patient to another area for de-escalation as she was not cooperative. Patient given 5mg Zyprexa PO @ 0741.

## 2023-10-01 PROCEDURE — 99232 SBSQ HOSP IP/OBS MODERATE 35: CPT | Performed by: STUDENT IN AN ORGANIZED HEALTH CARE EDUCATION/TRAINING PROGRAM

## 2023-10-01 RX ADMIN — NALTREXONE HYDROCHLORIDE 50 MG: 50 TABLET, FILM COATED ORAL at 08:35

## 2023-10-01 RX ADMIN — WARFARIN SODIUM 5 MG: 5 TABLET ORAL at 17:24

## 2023-10-01 RX ADMIN — LOSARTAN POTASSIUM 50 MG: 50 TABLET, FILM COATED ORAL at 08:35

## 2023-10-01 RX ADMIN — CHOLECALCIFEROL TAB 25 MCG (1000 UNIT) 1000 UNITS: 25 TAB at 08:35

## 2023-10-01 RX ADMIN — TOPIRAMATE 200 MG: 100 TABLET, FILM COATED ORAL at 08:35

## 2023-10-01 RX ADMIN — DIVALPROEX SODIUM 750 MG: 250 TABLET, DELAYED RELEASE ORAL at 08:35

## 2023-10-01 RX ADMIN — OXYBUTYNIN CHLORIDE 5 MG: 5 TABLET ORAL at 08:35

## 2023-10-01 RX ADMIN — PANTOPRAZOLE SODIUM 20 MG: 20 TABLET, DELAYED RELEASE ORAL at 05:58

## 2023-10-01 RX ADMIN — TOPIRAMATE 200 MG: 100 TABLET, FILM COATED ORAL at 17:24

## 2023-10-01 RX ADMIN — OXYBUTYNIN CHLORIDE 5 MG: 5 TABLET ORAL at 17:24

## 2023-10-01 RX ADMIN — LEVOTHYROXINE SODIUM 125 MCG: 125 TABLET ORAL at 05:58

## 2023-10-01 RX ADMIN — ESTRADIOL 0.5 MG: 1 TABLET ORAL at 08:34

## 2023-10-01 RX ADMIN — FUROSEMIDE 20 MG: 20 TABLET ORAL at 08:35

## 2023-10-01 RX ADMIN — METOPROLOL TARTRATE 25 MG: 25 TABLET, FILM COATED ORAL at 08:35

## 2023-10-01 RX ADMIN — BUPROPION HYDROCHLORIDE 300 MG: 300 TABLET, EXTENDED RELEASE ORAL at 08:35

## 2023-10-01 RX ADMIN — CLONIDINE HYDROCHLORIDE 0.1 MG: 0.1 TABLET ORAL at 21:16

## 2023-10-01 RX ADMIN — HYDROXYZINE HYDROCHLORIDE 100 MG: 50 TABLET, FILM COATED ORAL at 15:05

## 2023-10-01 RX ADMIN — MEMANTINE 10 MG: 10 TABLET ORAL at 08:35

## 2023-10-01 RX ADMIN — DIVALPROEX SODIUM 750 MG: 250 TABLET, DELAYED RELEASE ORAL at 21:16

## 2023-10-01 RX ADMIN — CLONIDINE HYDROCHLORIDE 0.1 MG: 0.1 TABLET ORAL at 08:36

## 2023-10-01 RX ADMIN — METOPROLOL TARTRATE 25 MG: 25 TABLET, FILM COATED ORAL at 21:16

## 2023-10-01 RX ADMIN — AMLODIPINE BESYLATE 5 MG: 5 TABLET ORAL at 08:36

## 2023-10-01 NOTE — NURSING NOTE
Patient irritable and stormed out of dining area to the nursing station during dinner to report to this nurse that she ordered a hamburger and received a cheeseburger. She was counseled again on appropriate behaviors and that the MHT was already working to resolve the issue. Patient continued with agitation "now my potatoes are going to be cold."  She was counseled again that her meal will be taken care of and she will receive it shortly. She was offered beverages in the interim and refused. Patient was asked to return to the dining room to wait patiently. Patient brightened after meal received and consumed and continued being sociable with peers, laughing, talking and watching television.

## 2023-10-01 NOTE — TREATMENT TEAM
10/01/23 1505   Dillon Anxiety Scale   Anxious Mood 4   Tension 4   Fears 4   Insomnia 2   Intellectual 2   Depressed Mood 3   Somatic Complaints: Muscular 0   Somatic Complaints: Sensory 0   Cardiovascular Symptoms 0   Respiratory Symptoms 0   Gastrointestinal Symptoms 0   Genitourinary Symptoms 0   Autonomic Symptoms 2   Behavior at Interview 4   Dillon Anxiety Score 25       Patient reports to nursing station tearful and shifting weight from one foot to another and sqeezing anxiety ball and saying that she is anxious and wants something for anxiety and someone to talk to. She was given 100mg Atarax @ 97 70 84 for severe anxiety. Patient reports she is upset because of a telephone call with her father who is temporary caregiver for her cat. She reports her father said the cat is crying and that makes her upset that she is not there to take care of her cat. She was counseled and allowed to acknowledge again that she reports she is 30 days drug free and looking forward to discharge; she is fortunate to have someone care for her cat and continue to set small goals on how to manage difficult situations. She was accepting and brightened after 15 minutes and returned to the dayroom with peers. Patient reports 100mg Atarax was effective.

## 2023-10-01 NOTE — PLAN OF CARE
Problem: INFECTION - ADULT  Goal: Absence or prevention of progression during hospitalization  Description: INTERVENTIONS:  - Assess and monitor for signs and symptoms of infection  - Monitor lab/diagnostic results  - Monitor all insertion sites, i.e. indwelling lines, tubes, and drains  - Monitor endotracheal if appropriate and nasal secretions for changes in amount and color  - [place] appropriate cooling/warming therapies per order  - Administer medications as ordered  - Instruct and encourage patient and family to use good hand hygiene technique  - Identify and instruct in appropriate isolation precautions for identified infection/condition  Outcome: Progressing  Goal: Absence of fever/infection during neutropenic period  Description: INTERVENTIONS:  - Monitor WBC    Outcome: Progressing     Problem: SAFETY ADULT  Goal: Patient will remain free of falls  Description: INTERVENTIONS:  - Educate patient/family on patient safety including physical limitations  - Instruct patient to call for assistance with activity   - Consult OT/PT to assist with strengthening/mobility   - Keep Call bell within reach  - Keep bed low and locked with side rails adjusted as appropriate  - Keep care items and personal belongings within reach  - Initiate and maintain comfort rounds  - Make Fall Risk Sign visible to staff  - Apply yellow socks and bracelet for high fall risk patients  - Consider moving patient to room near nurses station  Outcome: Progressing  Goal: Maintain or return to baseline ADL function  Description: INTERVENTIONS:  -  Assess patient's ability to carry out ADLs; assess patient's baseline for ADL function and identify physical deficits which impact ability to perform ADLs (bathing, care of mouth/teeth, toileting, grooming, dressing, etc.)  - Assess/evaluate cause of self-care deficits   - Assess range of motion  - Assess patient's mobility; develop plan if impaired  - Assess patient's need for assistive devices and provide as appropriate  - Encourage maximum independence but intervene and supervise when necessary  - Involve family in performance of ADLs  - Assess for home care needs following discharge   - Consider OT consult to assist with ADL evaluation and planning for discharge  - Provide patient education as appropriate  Outcome: Progressing  Goal: Maintains/Returns to pre admission functional level  Description: INTERVENTIONS:  - Perform BMAT or MOVE assessment daily.   - Set and communicate daily mobility goal to care team and patient/family/caregiver. - Collaborate with rehabilitation services on mobility goals if consulted  - Out of bed for toileting  - Record patient progress and toleration of activity level   Outcome: Progressing     Problem: Ineffective Coping  Goal: Cooperates with admission process  Description: Interventions:   - Complete admission process  Outcome: Progressing  Goal: Identifies ineffective coping skills  Outcome: Progressing  Goal: Identifies healthy coping skills  Outcome: Progressing  Goal: Demonstrates healthy coping skills  Outcome: Progressing  Goal: Understands least restrictive measures  Description: Interventions:  - Utilize least restrictive behavior  Outcome: Progressing     Problem: Anxiety  Goal: Anxiety is at manageable level  Description: Interventions:  - Assess and monitor patient's anxiety level. - Monitor for signs and symptoms (heart palpitations, chest pain, shortness of breath, headaches, nausea, feeling jumpy, restlessness, irritable, apprehensive). - Collaborate with interdisciplinary team and initiate plan and interventions as ordered.   - [place] patient to unit/surroundings  - Explain treatment plan  - Encourage participation in care  - Encourage verbalization of concerns/fears  - Identify coping mechanisms  - Assist in developing anxiety-reducing skills  - Administer/offer alternative therapies  - Limit or eliminate stimulants  Outcome: Progressing Problem: DISCHARGE PLANNING - CARE MANAGEMENT  Goal: Discharge to post-acute care or home with appropriate resources  Description: INTERVENTIONS:  - Conduct assessment to determine patient/family and health care team treatment goals, and need for post-acute services based on payer coverage, community resources, and patient preferences, and barriers to discharge  - Address psychosocial, clinical, and financial barriers to discharge as identified in assessment in conjunction with the patient/family and health care team  - Arrange appropriate level of post-acute services according to patient’s   needs and preference and payer coverage in collaboration with the physician and health care team  - Communicate with and update the patient/family, physician, and health care team regarding progress on the discharge plan  - Arrange appropriate transportation to post-acute venues  Outcome: Progressing

## 2023-10-01 NOTE — NURSING NOTE
Patient calm, cooperative, visible on millieu, socializing with peers and able to make needs known. She reports sleeping well and denies depression,  HI/SI/AVH, reports some anxiety and has a good appetite. She takes medications as scheduled. During assessment patient reflected that she is now sober 30 days and looks toward the future to return to her job and home.

## 2023-10-01 NOTE — PROGRESS NOTES
Progress Note - Keaton Sanders 47 y.o. female MRN: [id number]  Unit/Bed#: [facility] 846-47 Encounter: [id number]       Patient was visited on unit for continuing care; chart reviewed and discussed with the nursing staff. On approach, the patient was calm and cooperative. She reported feeling better she slept 9.5 hours last night as showed on her CPAP machine. She was happy about being clean for 1 month. She reported having no intense cravings for using meth. She was happy that she can save $300 a month she is to pay on drugs and appeared future oriented and goal-directed, stated that she would like to stay clean and continue the groups at [facility]. She endorsed better mood and denied intense anxiety symptoms. However, continues to have poor frustration tolerance and stated: " When things do not go my way I snap". Denied any changes in appetite, and energy level. No problem initiating and maintaining sleep. Denied A/VH currently. Denied SI/HI, intent or plan upon direct inquiry at this time. Patient continues to be visible in the milieu and interacts with staff and peers. No reports of aggression or self-injurious behavior on unit. No PRN medications used in the past 24 hours. As per Chiquita Martínez RN's note last night, "The patient was visible all evening in the dining room. She was calm and cooperative at the beginning of shift. When it came time for night time medications, she got irritable and agitated. The patient wouldn't take her medications due a peer, needing medication before her. This nurse offered the patient two different time, but she refused."    Patient accepted all offered medications (excep night dose of meds last night) and reported feeling better. No adverse effects of medications noted or reported. The patient received Haldol Dec 50 mg IM on 9/27/23. Maintained on Wellbutrin  mg daily, Depakote 750 mg nightly and Topamax 200 mg BID.  She was started on Naltrexone 25 mg daily for impulsivity which uptitrated to 50 mg daily. Also, was started on Clonidine 0.1 mg BID on 9/30/23 for impulsivity and poor frustration tolerance. Trough VPA level to be checked on 10/2/23. I called the ACT team and left a VM on 9/29/23; list of most recent meds to be confirmed by the ACT team, and doses to be adjusted as indicated.       Current Mental Status Evaluation:  Appearance and attitude: appeared as stated age, dressed in hospital attire, tattooed, with good hygiene  Eye contact: good  Motor Function: within normal limits, No PMA/PMR  Gait/station: Not observed  Speech: normal for rate, rhythm, volume, latency, amount  Language: No overt abnormality  Mood/affect: "better" / Affect was constricted but reactive, mood congruent, brighter  Thought Processes: linear  Thought content: denies suicidal ideation or homicidal ideation; no delusions or first rank symptoms  Associations: concrete associations  Perceptual disturbances: denies Auditory/Visual/Tactile Hallucinations  Orientation: oriented to time, person, place and to the situational context  Cognitive Function: intact  Memory: recent and remote memory grossly intact  Intellect: unable to assess  Fund of knowledge: aware of current events, aware of past history and vocabulary average  Impulse control: impulsive at times  Insight/judgment: fair/fair    Pain: denied  Pain scale: 0      Vital signs in last 24 hours:    Temp:  [97.1 °F (36.2 °C)-97.5 °F (36.4 °C)] 97.5 °F (36.4 °C)  HR:  [64] 64  Resp:  [16-17] 16  BP: ()/(57-72) 130/72    Laboratory results: I have personally reviewed all pertinent laboratory/tests results    Results from the past 24 hours: No results found for this or any previous visit (from the past 24 hour(s)).     Progress Toward Goals: improving    Assessment:  Principal Problem:    Schizoaffective disorder, bipolar type (HCC)  Active Problems:    Borderline personality disorder (HCC)    Polysubstance abuse ([facility])    Yan's esophagus determined by biopsy    Benign essential hypertension    Hypothyroidism    MAG (obstructive sleep apnea)    Overactive bladder    Sjogren's syndrome (HCC)    COPD (chronic obstructive pulmonary disease) ([facility])    Mixed hyperlipidemia    Medical clearance for psychiatric admission    Chronic tension-type headache, intractable    Contusion of elbow    History of pulmonary embolism    Contact dermatitis and eczema    Morbid obesity with BMI of 45.0-49.9, adult (HCC)    Restless leg syndrome        Plan:  - f/u [facility] recs regarding the medical problems  - Continue medication titration and treatment plan; adjust medication to optimize treatment response and as clinically indicated.      Scheduled medications:  Current Facility-Administered Medications   Medication Dose Route Frequency Provider Last Rate   • albuterol  2 puff Inhalation Q4H PRN JHONATAN Berg     • amLODIPine  5 mg Oral Daily JHONATAN Berg     • buPROPion  300 mg Oral Daily JHONATAN Mckeon     • cholecalciferol  1,000 Units Oral Daily LAURA MckeonNP     • cloNIDine  0.1 mg Oral Q12H [address] [facility] Chyrel Castleman, MD     • cyproheptadine  4 mg Oral TID PRN JHONATAN Berg     • Diclofenac Sodium  2 g Topical 4x Daily PRN JHONATAN Berg     • divalproex sodium  750 mg Oral Q12H [address] [facility] JHONATAN Mckeon     • estradiol  0.5 mg Oral Daily JHONATAN Berg     • furosemide  20 mg Oral Daily JHONATAN Berg     • hydrocortisone   Topical 4x Daily PRN JHONATAN Berg     • hydrOXYzine HCL  100 mg Oral Q6H PRN Chyrel Castleman, MD     • hydrOXYzine HCL  25 mg Oral Q6H PRN Max 4/day JHONATAN Mckeon     • hydrOXYzine HCL  50 mg Oral Q6H PRN Max 4/day JHONATAN Mckeon     • ibuprofen  200 mg Oral Q6H PRN JHONATAN Mckeon     • ibuprofen  400 mg Oral Q6H PRN JHONATAN Mckeon     • ibuprofen  600 mg Oral Q8H PRN JHONATAN Mckeon     • levothyroxine  125 mcg Oral Early Morning JHONATAN Berg     • LORazepam  1 mg Intramuscular Q6H PRN Max 3/day JHONATAN Cuevas     • losartan  50 mg Oral Daily JHONATAN Berg     • memantine  10 mg Oral Daily JHONATAN Berg     • metoprolol tartrate  25 mg Oral Q12H [facility] JHONATAN Berg     • naltrexone  50 mg Oral Daily Danny Jean-Baptiste MD     • OLANZapine  5 mg Intramuscular Q3H PRN Max 3/day JHONATAN Cuevas     • OLANZapine  2.5 mg Oral Q4H PRN Max 6/day JHONATAN Cuevas     • OLANZapine  5 mg Oral Q4H PRN Max 3/day JHONATAN Cuevas     • OLANZapine  5 mg Oral Q3H PRN Max 3/day JHONATAN Cuevas     • oxybutynin  5 mg Oral BID JHONATAN Berg     • pantoprazole  20 mg Oral Early Morning JHONATAN Berg     • polyethylene glycol  17 g Oral Daily PRN JHONATAN Cuevas     • rOPINIRole  1 mg Oral HS PRN JHONATAN Deal     • senna-docusate sodium  1 tablet Oral Daily PRN JHONATAN Cuevas     • topiramate  200 mg Oral BID JHONATAN Cuevas     • traZODone  50 mg Oral HS PRN JHONATAN Cuevas     • warfarin  5 mg Oral Daily (warfarin) JHONATAN Berg          PRN:  •  albuterol  •  cyproheptadine  •  Diclofenac Sodium  •  hydrocortisone  •  hydrOXYzine HCL  •  hydrOXYzine HCL  •  hydrOXYzine HCL  •  ibuprofen  •  ibuprofen  •  ibuprofen  •  LORazepam  •  OLANZapine  •  OLANZapine  •  OLANZapine  •  OLANZapine  •  polyethylene glycol  •  rOPINIRole  •  senna-docusate sodium  •  traZODone    - Observation: routine    - VS: as per unit protocol  - Diet: Regular diet  - Psychoeducation (benefits and potential risks) discussed, importance of compliance with the psychiatric treatment reiterated, and the patient verbalized understanding of the matter  - Encourage group attendance and milieu therapy    - The pt was educated and agreed to verbalize any suicidal thoughts, frustrations or concerns to the nursing staff, immediately. - Dispo: TBD       Next of Kin  · Extended Emergency Contact Information  · Primary Emergency Contact: Luigi Mason  · Address: [address] [place] [address]  ·          [place], [facility] [address]  · Home Phone: [phone]  · Mobile Phone: [phone]  · Relation: Father  · Secondary Emergency Contact: Leonor Ji  · Address: [address] [place] [address]  ·          [address]  · Home Phone: [phone]  · Mobile Phone: [phone]  · Relation: Friend      Counseling / Coordination of Care  Patient's progress reviewed with nursing staff. Medications, treatment progress and treatment plan reviewed with patient. Coping strategies reviewed with patient. Cognitive techniques utilized during the session. Reassurance and supportive therapy provided. Reoriented to reality and reassured. Encouraged participation in milieu and group therapy on the unit.      Haleigh Villalba MD  Attending [address] [place] [address]

## 2023-10-01 NOTE — NURSING NOTE
The patient was visible all evening in the dining room. She was calm and cooperative at the beginning of shift. When it came time for night time medications, she got irritable and agitated. The patient wouldn't take her medications due a peer, needing medication before her. This nurse offered the patient two different time, but she refused. Patient denied depression and anxiety. Denied SI/HI/AVH. Safety checks ongoing.

## 2023-10-02 LAB
INR PPP: 2.05 (ref 0.84–1.19)
PROTHROMBIN TIME: 23.5 SECONDS (ref 11.6–14.5)
VALPROATE SERPL-MCNC: 27 UG/ML (ref 50–100)

## 2023-10-02 PROCEDURE — 80164 ASSAY DIPROPYLACETIC ACD TOT: CPT | Performed by: STUDENT IN AN ORGANIZED HEALTH CARE EDUCATION/TRAINING PROGRAM

## 2023-10-02 PROCEDURE — 85610 PROTHROMBIN TIME: CPT | Performed by: NURSE PRACTITIONER

## 2023-10-02 PROCEDURE — 99232 SBSQ HOSP IP/OBS MODERATE 35: CPT | Performed by: STUDENT IN AN ORGANIZED HEALTH CARE EDUCATION/TRAINING PROGRAM

## 2023-10-02 RX ORDER — DIVALPROEX SODIUM 500 MG/1
1000 TABLET, DELAYED RELEASE ORAL
Status: DISCONTINUED | OUTPATIENT
Start: 2023-10-02 | End: 2023-10-03

## 2023-10-02 RX ORDER — WARFARIN SODIUM 5 MG/1
5 TABLET ORAL
Status: COMPLETED | OUTPATIENT
Start: 2023-10-02 | End: 2023-10-04

## 2023-10-02 RX ORDER — DIVALPROEX SODIUM 500 MG/1
1000 TABLET, DELAYED RELEASE ORAL EVERY 12 HOURS SCHEDULED
Status: DISCONTINUED | OUTPATIENT
Start: 2023-10-02 | End: 2023-10-02

## 2023-10-02 RX ADMIN — CLONIDINE HYDROCHLORIDE 0.1 MG: 0.1 TABLET ORAL at 21:28

## 2023-10-02 RX ADMIN — CLONIDINE HYDROCHLORIDE 0.1 MG: 0.1 TABLET ORAL at 08:21

## 2023-10-02 RX ADMIN — CHOLECALCIFEROL TAB 25 MCG (1000 UNIT) 1000 UNITS: 25 TAB at 08:20

## 2023-10-02 RX ADMIN — FUROSEMIDE 20 MG: 20 TABLET ORAL at 08:21

## 2023-10-02 RX ADMIN — TOPIRAMATE 200 MG: 100 TABLET, FILM COATED ORAL at 17:06

## 2023-10-02 RX ADMIN — OXYBUTYNIN CHLORIDE 5 MG: 5 TABLET ORAL at 17:06

## 2023-10-02 RX ADMIN — METOPROLOL TARTRATE 25 MG: 25 TABLET, FILM COATED ORAL at 21:28

## 2023-10-02 RX ADMIN — NALTREXONE HYDROCHLORIDE 50 MG: 50 TABLET, FILM COATED ORAL at 08:21

## 2023-10-02 RX ADMIN — WARFARIN SODIUM 5 MG: 5 TABLET ORAL at 17:06

## 2023-10-02 RX ADMIN — PANTOPRAZOLE SODIUM 20 MG: 20 TABLET, DELAYED RELEASE ORAL at 05:54

## 2023-10-02 RX ADMIN — MEMANTINE 10 MG: 10 TABLET ORAL at 08:21

## 2023-10-02 RX ADMIN — LOSARTAN POTASSIUM 50 MG: 50 TABLET, FILM COATED ORAL at 08:20

## 2023-10-02 RX ADMIN — METOPROLOL TARTRATE 25 MG: 25 TABLET, FILM COATED ORAL at 08:21

## 2023-10-02 RX ADMIN — TOPIRAMATE 200 MG: 100 TABLET, FILM COATED ORAL at 08:21

## 2023-10-02 RX ADMIN — AMLODIPINE BESYLATE 5 MG: 5 TABLET ORAL at 08:21

## 2023-10-02 RX ADMIN — OXYBUTYNIN CHLORIDE 5 MG: 5 TABLET ORAL at 08:23

## 2023-10-02 RX ADMIN — DIVALPROEX SODIUM 750 MG: 250 TABLET, DELAYED RELEASE ORAL at 08:21

## 2023-10-02 RX ADMIN — BUPROPION HYDROCHLORIDE 300 MG: 300 TABLET, EXTENDED RELEASE ORAL at 08:21

## 2023-10-02 RX ADMIN — DIVALPROEX SODIUM 1000 MG: 500 TABLET, DELAYED RELEASE ORAL at 21:28

## 2023-10-02 RX ADMIN — ESTRADIOL 0.5 MG: 1 TABLET ORAL at 08:20

## 2023-10-02 RX ADMIN — LEVOTHYROXINE SODIUM 125 MCG: 125 TABLET ORAL at 05:54

## 2023-10-02 NOTE — PLAN OF CARE
Problem: INFECTION - ADULT  Goal: Absence or prevention of progression during hospitalization  Description: INTERVENTIONS:  - Assess and monitor for signs and symptoms of infection  - Monitor lab/diagnostic results  - Monitor all insertion sites, i.e. indwelling lines, tubes, and drains  - Monitor endotracheal if appropriate and nasal secretions for changes in amount and color  - [place] appropriate cooling/warming therapies per order  - Administer medications as ordered  - Instruct and encourage patient and family to use good hand hygiene technique  - Identify and instruct in appropriate isolation precautions for identified infection/condition  Outcome: Progressing  Goal: Absence of fever/infection during neutropenic period  Description: INTERVENTIONS:  - Monitor WBC    Outcome: Progressing     Problem: SAFETY ADULT  Goal: Patient will remain free of falls  Description: INTERVENTIONS:  - Educate patient/family on patient safety including physical limitations  - Instruct patient to call for assistance with activity   - Consult OT/PT to assist with strengthening/mobility   - Keep Call bell within reach  - Keep bed low and locked with side rails adjusted as appropriate  - Keep care items and personal belongings within reach  - Initiate and maintain comfort rounds  - Make Fall Risk Sign visible to staff  - Apply yellow socks and bracelet for high fall risk patients  - Consider moving patient to room near nurses station  Outcome: Progressing  Goal: Maintain or return to baseline ADL function  Description: INTERVENTIONS:  -  Assess patient's ability to carry out ADLs; assess patient's baseline for ADL function and identify physical deficits which impact ability to perform ADLs (bathing, care of mouth/teeth, toileting, grooming, dressing, etc.)  - Assess/evaluate cause of self-care deficits   - Assess range of motion  - Assess patient's mobility; develop plan if impaired  - Assess patient's need for assistive devices and provide as appropriate  - Encourage maximum independence but intervene and supervise when necessary  - Involve family in performance of ADLs  - Assess for home care needs following discharge   - Consider OT consult to assist with ADL evaluation and planning for discharge  - Provide patient education as appropriate  Outcome: Progressing  Goal: Maintains/Returns to pre admission functional level  Description: INTERVENTIONS:  - Perform BMAT or MOVE assessment daily.   - Set and communicate daily mobility goal to care team and patient/family/caregiver. - Collaborate with rehabilitation services on mobility goals if consulted  - Out of bed for toileting  - Record patient progress and toleration of activity level   Outcome: Progressing     Problem: Ineffective Coping  Goal: Cooperates with admission process  Description: Interventions:   - Complete admission process  Outcome: Progressing  Goal: Identifies ineffective coping skills  Outcome: Progressing  Goal: Identifies healthy coping skills  Outcome: Progressing  Goal: Demonstrates healthy coping skills  Outcome: Progressing  Goal: Understands least restrictive measures  Description: Interventions:  - Utilize least restrictive behavior  Outcome: Progressing     Problem: Anxiety  Goal: Anxiety is at manageable level  Description: Interventions:  - Assess and monitor patient's anxiety level. - Monitor for signs and symptoms (heart palpitations, chest pain, shortness of breath, headaches, nausea, feeling jumpy, restlessness, irritable, apprehensive). - Collaborate with interdisciplinary team and initiate plan and interventions as ordered.   - [place] patient to unit/surroundings  - Explain treatment plan  - Encourage participation in care  - Encourage verbalization of concerns/fears  - Identify coping mechanisms  - Assist in developing anxiety-reducing skills  - Administer/offer alternative therapies  - Limit or eliminate stimulants  Outcome: Progressing Problem: DISCHARGE PLANNING - CARE MANAGEMENT  Goal: Discharge to post-acute care or home with appropriate resources  Description: INTERVENTIONS:  - Conduct assessment to determine patient/family and health care team treatment goals, and need for post-acute services based on payer coverage, community resources, and patient preferences, and barriers to discharge  - Address psychosocial, clinical, and financial barriers to discharge as identified in assessment in conjunction with the patient/family and health care team  - Arrange appropriate level of post-acute services according to patient’s   needs and preference and payer coverage in collaboration with the physician and health care team  - Communicate with and update the patient/family, physician, and health care team regarding progress on the discharge plan  - Arrange appropriate transportation to post-acute venues  Outcome: Progressing

## 2023-10-02 NOTE — TREATMENT TEAM
CM received a call from [address] [place] [address], the patient's D&A counselor through [phone]. Annemarie reported that she would fax CM over the patient's most recent medication list. [address] [place] [address] reported that the patient has been increasingly attention seeking in behaviors, calling [facility] on call number multiple times throughout the day/night when not receiving what she wants/feels she needs. Annemarie reported [facility] is trying to put in boundaries with the patient. CM reported that the patient would likely be ready to d/c by the end of the week. Annemarie expressed understanding.

## 2023-10-02 NOTE — PROGRESS NOTES
Progress Note - Keaton Sanders 47 y.o. female MRN: [id number]  Unit/Bed#: [facility] 541-32 Encounter: [id number]       Patient was visited on unit for continuing care; chart reviewed and discussed with multidisciplinary treatment team. On approach, the patient was calm and cooperative. She reported feeling better since being on the current regimen, and endorsed better control of her frustration. She noted that she got upset last night about the mistake in her food order, but reportedly was able to control her anger after talking to the nurse and did not act out. She stated that she feels "accomplished" and is "proud of [her]self" being able to stay clean for a month and denied any intense cravings for methamphetamine. Denied any changes in appetite, and energy level. No problem initiating and maintaining sleep. Denied A/VH currently. Denied SI/HI, intent or plan upon direct inquiry at this time. She appeared goal-directed and future oriented, and asked if she could be discharged on Thursday to see her therapist in the afternoon and is willing to continue outpatient follow-up with [facility] and [facility] to stay clean. Patient continues to be visible in the milieu and interacts with staff and peers. No reports of aggression or self-injurious behavior on unit. No PRN medications used in the past 24 hours except Atarax 100 mg PRN at 1505 yesterday for anxiety when was reportedly upset about her cat. As per Heather Villatoro RN's note yesterday: "Patient irritable and stormed out of dining area to the nursing station during dinner to report to this nurse that she ordered a hamburger and received a cheeseburger. She was counseled again on appropriate behaviors and that the MHT was already working to resolve the issue. Patient continued with agitation "now my potatoes are going to be cold."  She was counseled again that her meal will be taken care of and she will receive it shortly.   She was offered beverages in the interim and refused. Patient was asked to return to the dining room to wait patiently. Patient brightened after meal received and consumed and continued being sociable with peers, laughing, talking and watching television."    Patient accepted all offered medications and reported feeling better. No adverse effects of medications noted or reported. The patient received Haldol Dec 50 mg IM on 9/27/23. Maintained on Wellbutrin  mg daily, Depakote 750 mg nightly and Topamax 200 mg BID. She was started on Naltrexone 25 mg daily for impulsivity which uptitrated to 50 mg daily. Also, was started on Clonidine 0.1 mg BID on 9/30/23 for impulsivity and poor frustration tolerance.  VPA level was 27 this morning. Depakote increased to 750/1000 mg on 10/2/23 and VPA level to be checked on Thursday. Tentative discharge on Thursday following further stabilization and dose adjustments with outpatient f/u with -[facility].      Current Mental Status Evaluation:  Appearance and attitude: appeared as stated age, casually dressed, tattooed, with good hygiene  Eye contact: good  Motor Function: within normal limits, intact gait, No PMA/PMR  Gait/station: normal gait/station and normal balance  Speech: normal for rate, rhythm, volume, latency, amount  Language: No overt abnormality  Mood/affect: less dysphoric, "good" / Affect was euthymic, reactive, in full range, normal intensity and mood congruent  Thought Processes: sequential and goal-directed  Thought content: denied suicidal ideations or homicidal ideations, no overt delusions elicited  Associations: concrete associations  Perceptual disturbances: denies Auditory/Visual/Tactile Hallucinations  Orientation: oriented to time, person, place and to the situational context  Cognitive Function: intact  Memory: recent and remote memory grossly intact  Intellect: unable to assess  Fund of knowledge: aware of current events, aware of past history and vocabulary average  Impulse control: impulsive at times  Insight/judgment: fair/fair    Pain: denied  Pain scale: 0      Vital signs in last 24 hours:    Temp:  [96.8 °F (36 °C)-97.3 °F (36.3 °C)] 97.3 °F (36.3 °C)  HR:  [65-74] 65  Resp:  [16] 16  BP: (110-125)/(58-68) 116/68    Laboratory results: I have personally reviewed all pertinent laboratory/tests results    Results from the past 24 hours:   Recent Results (from the past 24 hour(s))   Protime-INR    Collection Time: 10/02/23  4:42 AM   Result Value Ref Range    Protime 23.5 (H) 11.6 - 14.5 seconds    INR 2.05 (H) 0.84 - 1.19   Valproic acid level, total    Collection Time: 10/02/23  4:42 AM   Result Value Ref Range    Valproic Acid, Total 27 (L) 50 - 100 ug/mL       Progress Toward Goals: gradual improvement    Assessment:  Principal Problem:    Schizoaffective disorder, bipolar type ([facility])  Active Problems:    Borderline personality disorder (HCC)    Polysubstance abuse ([facility])    Yan's esophagus determined by biopsy    Benign essential hypertension    Hypothyroidism    MAG (obstructive sleep apnea)    Overactive bladder    Sjogren's syndrome ([facility])    COPD (chronic obstructive pulmonary disease) ([facility])    Mixed hyperlipidemia    Medical clearance for psychiatric admission    Chronic tension-type headache, intractable    Contusion of elbow    History of pulmonary embolism    Contact dermatitis and eczema    Morbid obesity with BMI of 45.0-49.9, adult (HCC)    Restless leg syndrome        Plan:  - f/u [facility] recs regarding the medical problems  - Continue medication titration and treatment plan; adjust medication to optimize treatment response and as clinically indicated.      Scheduled medications:  Current Facility-Administered Medications   Medication Dose Route Frequency Provider Last Rate   • albuterol  2 puff Inhalation Q4H PRN JHONATAN Berg     • amLODIPine  5 mg Oral Daily JHONATAN Berg     • buPROPion  300 mg Oral Daily Max Mayes CRNP     • cholecalciferol  1,000 Units Oral Daily LisetLAURA RuffinNP     • cloNIDine  0.1 mg Oral Q12H [facility] Johny Brunner, MD     • cyproheptadine  4 mg Oral TID PRN JHONATAN Berg     • Diclofenac Sodium  2 g Topical 4x Daily PRN JHONATAN Berg     • divalproex sodium  1,000 mg Oral HS Johny Brunner, MD     • [START ON 10/3/2023] divalproex sodium  750 mg Oral Daily Johny Brunner, MD     • estradiol  0.5 mg Oral Daily JHNOATAN Berg     • furosemide  20 mg Oral Daily JHONATAN Berg     • hydrocortisone   Topical 4x Daily PRN JHONATAN Berg     • hydrOXYzine HCL  100 mg Oral Q6H PRN Johny Brunner, MD     • hydrOXYzine HCL  25 mg Oral Q6H PRN Max 4/day Liset Bristle, CRNP     • hydrOXYzine HCL  50 mg Oral Q6H PRN Max 4/day Liset Bristle, CRNP     • ibuprofen  200 mg Oral Q6H PRN Liset Bristsalma, CRNP     • ibuprofen  400 mg Oral Q6H PRN Liset Bristsalma, CRNP     • ibuprofen  600 mg Oral Q8H PRN Liset Bristle, CRNP     • levothyroxine  125 mcg Oral Early Morning JHONATAN Berg     • LORazepam  1 mg Intramuscular Q6H PRN Max 3/day Liset Bristsalma, CRNP     • losartan  50 mg Oral Daily JHONATAN Berg     • memantine  10 mg Oral Daily JHONATAN Berg     • metoprolol tartrate  25 mg Oral Q12H [facility] JHONATAN Berg     • naltrexone  50 mg Oral Daily Johny Brunner, MD     • OLANZapine  5 mg Intramuscular Q3H PRN Max 3/day Liset Bristle, CRNP     • OLANZapine  2.5 mg Oral Q4H PRN Max 6/day Liset Bristle, CRNP     • OLANZapine  5 mg Oral Q4H PRN Max 3/day Liset Bristle, CRNP     • OLANZapine  5 mg Oral Q3H PRN Max 3/day Liset Bristle, CRNP     • oxybutynin  5 mg Oral BID JHONATAN Berg     • pantoprazole  20 mg Oral Early Morning JHONATAN Berg     • polyethylene glycol  17 g Oral Daily PRN JHONATAN Mcallister     • rOPINIRole  1 mg Oral HS PRN JHONATAN Berg     • senna-docusate sodium  1 tablet Oral Daily PRN JHONATAN Ramirez     • topiramate  200 mg Oral BID JHONATAN Ramirez     • traZODone  50 mg Oral HS PRN JHONATAN Ramirez     • warfarin  5 mg Oral Daily (warfarin) JHONATAN Berg          PRN:  •  albuterol  •  cyproheptadine  •  Diclofenac Sodium  •  hydrocortisone  •  hydrOXYzine HCL  •  hydrOXYzine HCL  •  hydrOXYzine HCL  •  ibuprofen  •  ibuprofen  •  ibuprofen  •  LORazepam  •  OLANZapine  •  OLANZapine  •  OLANZapine  •  OLANZapine  •  polyethylene glycol  •  rOPINIRole  •  senna-docusate sodium  •  traZODone    - Observation: routine    - VS: as per unit protocol  - Diet: Regular diet  - Psychoeducation (benefits and potential risks) discussed, importance of compliance with the psychiatric treatment reiterated, and the patient verbalized understanding of the matter  - Encourage group attendance and milieu therapy    - The pt was educated and agreed to verbalize any suicidal thoughts, frustrations or concerns to the nursing staff, immediately. - Dispo: TBD       Next of Kin  · Extended Emergency Contact Information  · Primary Emergency Contact: Luigi Mason  · Address: [address] [place] [address]  ·          [address]  · Home Phone: [phone]  · Mobile Phone: [phone]  · Relation: Father  · Secondary Emergency Contact: Leonor Ji  · Address: [address] [place] [address]  ·          [address]  · Home Phone: [phone]  · Mobile Phone: [phone]  · Relation: Friend      Counseling / Coordination of Care  Patient's progress discussed with staff in treatment team meeting. Medications, treatment progress and treatment plan reviewed with patient. Medication changes discussed with patient. Coping mechanisms including anger management techniques reviewed with patient. Supportive therapy provided to patient. Cognitive techniques utilized during the session.   Reassurance and supportive therapy provided. Reoriented to reality and reassured. Encouraged participation in milieu and group therapy on the unit.      Johny Brunner, MD  Attending [address] [place] [address] Drive

## 2023-10-02 NOTE — NURSING NOTE
Patient cooperative and visible on millieu and has an irritable edge but is redirectable with behavior management and limit setting. She is sociable with peers and reports she is looking forward to discharge this week so that she can return to work next week. She makes her needs known, takes medications as scheduled and denies anxiety, depression, HI/SI/AVH.

## 2023-10-02 NOTE — TREATMENT TEAM
Pt completed  relapse prevention plan. Pt signed and copy in chart. Crisis and warmline phone numbers provided. Pt noted signs and symptoms upon admission as "anger and overdose". Pt attend groups often. Pharmacy [facility]. 10/02/23 1100   Activity/Group Checklist   Group Admission/Discharge   Attendance Attended   Attendance Duration (min) 16-30   Interactions Interacted appropriately   Affect/Mood Appropriate   Goals Achieved Identified feelings; Identified relapse prevention strategies; Increased hopefulness; Able to listen to others; Identified resources and support systems; Displayed empathy;Able to engage in interactions; Able to reflect/comment on own behavior;Able to manage/cope with feelings;Verbalized increased hopefulness; Able to self-disclose; Able to recieve feedback

## 2023-10-02 NOTE — CASE MANAGEMENT
DAVID spoke to the patient who reported that she is hoping to get home Thursday morning to meet with Annemarie from [facility] in the afternoon and then return to work the following day. The attending was in agreement with Thursday d/c. DAVID called Annemarie at [facility], no answer, CM left VM updating Annemarie on the plan.

## 2023-10-02 NOTE — TREATMENT TEAM
Pt attended all groups. Pt pleasant and visible. Pt able to engage and self reflect. Crisis, DELIA, 65 and warmline phone numbers reviewed. Pt did indicate that she would like to donate money to an animal cause/shelter/tigers since she will no longer be spending on meth. Encouraged to to consult with ACT team or others in order to make beneficial and safe decisions. 10/02/23 1330   Activity/Group Checklist   Group Other (Comment)  (community support and resources)   Attendance Attended   Attendance Duration (min) 31-45   Interactions Interacted appropriately   Affect/Mood Bright; Appropriate   Goals Achieved Identified feelings; Discussed coping strategies; Able to listen to others; Able to engage in interactions; Able to reflect/comment on own behavior;Verbalized increased hopefulness; Able to manage/cope with feelings; Able to self-disclose; Able to recieve feedback; Identified resources and support systems

## 2023-10-03 PROCEDURE — 99232 SBSQ HOSP IP/OBS MODERATE 35: CPT | Performed by: STUDENT IN AN ORGANIZED HEALTH CARE EDUCATION/TRAINING PROGRAM

## 2023-10-03 RX ORDER — DIVALPROEX SODIUM 500 MG/1
1000 TABLET, DELAYED RELEASE ORAL
Status: DISCONTINUED | OUTPATIENT
Start: 2023-10-03 | End: 2023-10-06 | Stop reason: HOSPADM

## 2023-10-03 RX ORDER — CLONIDINE HYDROCHLORIDE 0.1 MG/1
0.1 TABLET ORAL EVERY 12 HOURS SCHEDULED
Status: DISCONTINUED | OUTPATIENT
Start: 2023-10-03 | End: 2023-10-06 | Stop reason: HOSPADM

## 2023-10-03 RX ORDER — LURASIDONE HYDROCHLORIDE 40 MG/1
40 TABLET, FILM COATED ORAL
Status: DISCONTINUED | OUTPATIENT
Start: 2023-10-03 | End: 2023-10-06 | Stop reason: HOSPADM

## 2023-10-03 RX ORDER — LIDOCAINE 50 MG/G
1 PATCH TOPICAL DAILY
Status: DISCONTINUED | OUTPATIENT
Start: 2023-10-03 | End: 2023-10-06 | Stop reason: HOSPADM

## 2023-10-03 RX ADMIN — AMLODIPINE BESYLATE 5 MG: 5 TABLET ORAL at 09:14

## 2023-10-03 RX ADMIN — TOPIRAMATE 200 MG: 100 TABLET, FILM COATED ORAL at 17:08

## 2023-10-03 RX ADMIN — BUPROPION HYDROCHLORIDE 300 MG: 300 TABLET, EXTENDED RELEASE ORAL at 09:12

## 2023-10-03 RX ADMIN — IBUPROFEN 400 MG: 400 TABLET ORAL at 12:18

## 2023-10-03 RX ADMIN — ESTRADIOL 0.5 MG: 1 TABLET ORAL at 09:13

## 2023-10-03 RX ADMIN — CLONIDINE HYDROCHLORIDE 0.1 MG: 0.1 TABLET ORAL at 09:14

## 2023-10-03 RX ADMIN — OXYBUTYNIN CHLORIDE 5 MG: 5 TABLET ORAL at 17:11

## 2023-10-03 RX ADMIN — LURASIDONE HYDROCHLORIDE 40 MG: 40 TABLET, COATED ORAL at 09:15

## 2023-10-03 RX ADMIN — NALTREXONE HYDROCHLORIDE 50 MG: 50 TABLET, FILM COATED ORAL at 09:13

## 2023-10-03 RX ADMIN — LOSARTAN POTASSIUM 50 MG: 50 TABLET, FILM COATED ORAL at 09:13

## 2023-10-03 RX ADMIN — METOPROLOL TARTRATE 25 MG: 25 TABLET, FILM COATED ORAL at 09:12

## 2023-10-03 RX ADMIN — OXYBUTYNIN CHLORIDE 5 MG: 5 TABLET ORAL at 09:19

## 2023-10-03 RX ADMIN — PANTOPRAZOLE SODIUM 20 MG: 20 TABLET, DELAYED RELEASE ORAL at 05:58

## 2023-10-03 RX ADMIN — DIVALPROEX SODIUM 750 MG: 250 TABLET, DELAYED RELEASE ORAL at 09:13

## 2023-10-03 RX ADMIN — MEMANTINE 10 MG: 10 TABLET ORAL at 09:13

## 2023-10-03 RX ADMIN — LIDOCAINE 1 PATCH: 50 PATCH CUTANEOUS at 15:22

## 2023-10-03 RX ADMIN — LEVOTHYROXINE SODIUM 125 MCG: 125 TABLET ORAL at 05:58

## 2023-10-03 RX ADMIN — FUROSEMIDE 20 MG: 20 TABLET ORAL at 09:13

## 2023-10-03 RX ADMIN — TOPIRAMATE 200 MG: 100 TABLET, FILM COATED ORAL at 09:13

## 2023-10-03 RX ADMIN — WARFARIN SODIUM 5 MG: 5 TABLET ORAL at 17:08

## 2023-10-03 RX ADMIN — HYDROXYZINE HYDROCHLORIDE 50 MG: 50 TABLET, FILM COATED ORAL at 10:05

## 2023-10-03 RX ADMIN — CHOLECALCIFEROL TAB 25 MCG (1000 UNIT) 1000 UNITS: 25 TAB at 09:13

## 2023-10-03 RX ADMIN — DIVALPROEX SODIUM 1000 MG: 500 TABLET, DELAYED RELEASE ORAL at 20:09

## 2023-10-03 NOTE — TREATMENT TEAM
Pt attended anger management group. Pt was cooperative and able to self reflect. Pt was able to problem solve and discuss boundaries. Pt did indicate she needed to walk around after group and feeling restless. 10/03/23 1300   Activity/Group Checklist   Group Anger management   Attendance Attended   Attendance Duration (min) 46-60   Interactions Interacted appropriately   Affect/Mood Appropriate   Goals Achieved Identified feelings; Identified relapse prevention strategies; Increased hopefulness; Discussed coping strategies; Able to engage in interactions; Able to listen to others; Able to reflect/comment on own behavior;Verbalized increased hopefulness; Able to manage/cope with feelings; Able to self-disclose; Able to recieve feedback     .

## 2023-10-03 NOTE — PROGRESS NOTES
Progress Note - Keaton Sanders 47 y.o. female MRN: [id number]  Unit/Bed#: [facility] 650-07 Encounter: [id number]       Patient was visited on unit for continuing care; chart reviewed and discussed with multidisciplinary treatment team. On approach, the patient was calm and cooperative. She was initially on irritable edge, frustrated when her needs not met immediately. Later, she became more pleasant, apologized for her anger outburst last evening, and reported feeling better today. Denied any changes in appetite, and energy level. No problem initiating and maintaining sleep. Denied A/VH currently. Denied SI/HI, intent or plan upon direct inquiry at this time. Patient continues to be visible in the milieu and interacts with staff and peers. as per nursing report yesterday as documented by Chelsea Lester RN: "Pt approached nursing station at 2055 and requested for her CPAP, HS meds, and a gown. Pt reassured and she returned to the pt's dinning room. Moments later, loud bangs were heard from the dinning and it was pt flipping chairs, crying and cursing. Pt became more irritable when staff attempted to redirect. Walk through requested security and pt was escorted to her room. Pt continues with crying spells and blaming staff saying that staff was not fast enough with her medication, CPAP, and the gown she requested. Pt's behaviors were noted just 5 minutes after she had requested for medication, CPAP, and the gown."    Patient accepted all offered medications and reported feeling better. No adverse effects of medications noted or reported. The patient received Haldol Dec 50 mg IM on 9/27/23. Maintained on Wellbutrin  mg daily, Depakote 750 mg nightly and Topamax 200 mg BID. She was started on Naltrexone 25 mg daily for impulsivity which uptitrated to 50 mg daily. Also, was started on Clonidine 0.1 mg BID on 9/30/23 for impulsivity and poor frustration tolerance.  VPA level was 27 this morning. Depakote increased to 750/1000 mg on 10/2/23 and VPA level to be checked on Thursday. The patient was restarted on Latuda 40 mg daily; doses to be adjusted as indicated. Current Mental Status Evaluation:  Appearance and attitude: appeared as stated age, dressed in hospital attire, overweight, tattooed, with good hygiene  Eye contact: good  Motor Function: within normal limits, intact gait, No PMA/PMR  Gait/station: normal gait/station and normal balance  Speech: normal for rate, rhythm, volume, latency, amount  Language: No overt abnormality  Mood/affect: dysphoric / Affect was constricted but reactive, mood congruent  Thought Processes: concrete  Thought content: denied suicidal ideations or homicidal ideations, no overt delusions elicited  Associations: concrete associations  Perceptual disturbances: denies Auditory/Visual/Tactile Hallucinations  Orientation: oriented to time, person, place and to the situational context  Cognitive Function: intact  Memory: recent and remote memory grossly intact  Intellect: unable to assess  Fund of knowledge: aware of current events, aware of past history and vocabulary average  Impulse control: impulsive at times  Insight/judgment: fair/fair    Vital signs in last 24 hours:    Temp:  [97.7 °F (36.5 °C)-98.6 °F (37 °C)] 97.7 °F (36.5 °C)  HR:  [61-68] 61  Resp:  [16-18] 16  BP: (104-135)/(52-72) 123/67    Laboratory results: I have personally reviewed all pertinent laboratory/tests results    Results from the past 24 hours: No results found for this or any previous visit (from the past 24 hour(s)).     Progress Toward Goals: limited improvement    Assessment:  Principal Problem:    Schizoaffective disorder, bipolar type (HCC)  Active Problems:    Borderline personality disorder (HCC)    Polysubstance abuse ([address])    Yan's esophagus determined by biopsy    Benign essential hypertension    Hypothyroidism    MAG (obstructive sleep apnea)    Overactive bladder    Sjogren's syndrome (HCC)    COPD (chronic obstructive pulmonary disease) (HCC)    Mixed hyperlipidemia    Medical clearance for psychiatric admission    Chronic tension-type headache, intractable    Contusion of elbow    History of pulmonary embolism    Contact dermatitis and eczema    Morbid obesity with BMI of 45.0-49.9, adult (HCC)    Restless leg syndrome        Plan:  - f/u [facility] recs regarding the medical problems  - Check VPA level on Thursday  - Continue medication titration and treatment plan; adjust medication to optimize treatment response and as clinically indicated.      Scheduled medications:  Current Facility-Administered Medications   Medication Dose Route Frequency Provider Last Rate   • albuterol  2 puff Inhalation Q4H PRN JHONATAN Berg     • amLODIPine  5 mg Oral Daily JHONATAN Berg     • buPROPion  300 mg Oral Daily JHONATAN Gaspar     • cholecalciferol  1,000 Units Oral Daily JHONATAN Gaspar     • cloNIDine  0.1 mg Oral Q12H [address] Tarun Suarez MD     • cyproheptadine  4 mg Oral TID PRN JHONATAN Berg     • Diclofenac Sodium  2 g Topical 4x Daily PRN JHONATAN Berg     • divalproex sodium  1,000 mg Oral HS Tarun Suarez MD     • divalproex sodium  750 mg Oral Daily Tarun Suarez MD     • estradiol  0.5 mg Oral Daily JHONATAN Berg     • furosemide  20 mg Oral Daily JHONATAN Berg     • hydrocortisone   Topical 4x Daily PRN JHONATAN Berg     • hydrOXYzine HCL  100 mg Oral Q6H PRN Tarun Suarez MD     • hydrOXYzine HCL  25 mg Oral Q6H PRN Max 4/day JHONATAN Gaspar     • hydrOXYzine HCL  50 mg Oral Q6H PRN Max 4/day JHONATAN Gaspar     • ibuprofen  200 mg Oral Q6H PRN JHONATAN Gaspar     • ibuprofen  400 mg Oral Q6H PRN JHONATAN Gaspar     • ibuprofen  600 mg Oral Q8H PRN JHONATAN Gaspar     • levothyroxine  125 mcg Oral Early Morning JHONATAN Berg     • LORazepam  1 mg Intramuscular Q6H PRN Max 3/day JHONATAN Mckeon     • losartan  50 mg Oral Daily JHONATAN Berg     • lurasidone  40 mg Oral Daily With Breakfast Chyrel Castleman, MD     • memantine  10 mg Oral Daily JHONATAN Berg     • metoprolol tartrate  25 mg Oral Q12H [facility] JHONATAN Berg     • naltrexone  50 mg Oral Daily Chyrel Castleman, MD     • OLANZapine  5 mg Intramuscular Q3H PRN Max 3/day JHONATAN Mckeon     • OLANZapine  2.5 mg Oral Q4H PRN Max 6/day JHONATAN Mckeon     • OLANZapine  5 mg Oral Q4H PRN Max 3/day JHONATAN Mckeon     • OLANZapine  5 mg Oral Q3H PRN Max 3/day JHONATAN Mckeon     • oxybutynin  5 mg Oral BID JHONATAN Berg     • pantoprazole  20 mg Oral Early Morning JHONATAN Berg     • polyethylene glycol  17 g Oral Daily PRN JHONATAN Mckeon     • rOPINIRole  1 mg Oral HS PRN [facility] JHONATAN Velasquez     • senna-docusate sodium  1 tablet Oral Daily PRN JHONATAN Mckeon     • topiramate  200 mg Oral BID JHONATAN Mckeon     • traZODone  50 mg Oral HS PRN JHONATAN Mckeon     • warfarin  5 mg Oral Daily (warfarin) JHONATAN Berg          PRN:  •  albuterol  •  cyproheptadine  •  Diclofenac Sodium  •  hydrocortisone  •  hydrOXYzine HCL  •  hydrOXYzine HCL  •  hydrOXYzine HCL  •  ibuprofen  •  ibuprofen  •  ibuprofen  •  LORazepam  •  OLANZapine  •  OLANZapine  •  OLANZapine  •  OLANZapine  •  polyethylene glycol  •  rOPINIRole  •  senna-docusate sodium  •  traZODone    - Observation: routine    - VS: as per unit protocol  - Diet: Regular diet  - Psychoeducation (benefits and potential risks) discussed, importance of compliance with the psychiatric treatment reiterated, and the patient verbalized understanding of the matter  - Encourage group attendance and milieu therapy    - The pt was educated and agreed to verbalize any suicidal thoughts, frustrations or concerns to the nursing staff, immediately. - Dispo: TBD       Next of Kin  · Extended Emergency Contact Information  · Primary Emergency Contact: Luigi Mason  · Address: [address] [place] [address]  ·          [place] [address]  · Home Phone: [phone]  · Mobile Phone: [phone]  · Relation: Father  · Secondary Emergency Contact: Leonor Ji  · Address: [address] [place] [address]  ·          [address]  · Home Phone: [phone]  · Mobile Phone: [phone]  · Relation: Friend      Counseling / Coordination of Care  Patient's progress discussed with staff in treatment team meeting. Medications, treatment progress and treatment plan reviewed with patient. Medication changes discussed with patient. Supportive therapy provided to patient. Cognitive techniques utilized during the session. Reassurance and supportive therapy provided. Reoriented to reality and reassured. Encouraged participation in milieu and group therapy on the unit. Crisis/safety plan discussed with patient. Discharge plan discussed with patient.      Tarun Suarez MD  Attending [address] [place] [address] Drive

## 2023-10-03 NOTE — NURSING NOTE
Pt is visible on the unit and social with peers. Pt was given Prn Atarax for anxiety which pt reported was effective. Prn Motrin given c/o pain pt reported was not effective. Pt other wise takes medication as schduled and has good intake at meals time. pt denies SI/HI/A/VH this shift.

## 2023-10-03 NOTE — NURSING NOTE
Pt approached nursing station at 2055 and requested for her CPAP, HS meds, and a gown. Pt reassured and she returned to the pt's dinning room. Moments later, loud bangs were heard from the dinning and it was pt flipping chairs, crying and cursing. Pt became more irritable when staff attempted to redirect. Walk through requested security and pt was escorted to her room. Pt continues with crying spells and blaming staff saying that staff was not fast enough with her medication, CPAP, and the gown she requested. Pt's behaviors were noted just 5 minutes after she had requested for medication, CPAP, and the gown.

## 2023-10-03 NOTE — CASE MANAGEMENT
CM received a CM from [facility] @ [phone]  (personal cell) regarding the patient's end of week d/c. CM called Blanca Crouch back, no answer, left  requesting a call back. CM received call back from Blanca Crouch. Blanca Crouch asked that CM keep [facility] updated on the patient's d/c date plan so they can schedule their visits accordingly. DAVID reported the current plan is for Friday. Blanca Crouch reported if the patient d/c to home on Friday they would see her on Monday. CM agreed to keep [facility] updated.

## 2023-10-03 NOTE — PLAN OF CARE
Problem: INFECTION - ADULT  Goal: Absence or prevention of progression during hospitalization  Description: INTERVENTIONS:  - Assess and monitor for signs and symptoms of infection  - Monitor lab/diagnostic results  - Monitor all insertion sites, i.e. indwelling lines, tubes, and drains  - Monitor endotracheal if appropriate and nasal secretions for changes in amount and color  - [place] appropriate cooling/warming therapies per order  - Administer medications as ordered  - Instruct and encourage patient and family to use good hand hygiene technique  - Identify and instruct in appropriate isolation precautions for identified infection/condition  Outcome: Progressing  Goal: Absence of fever/infection during neutropenic period  Description: INTERVENTIONS:  - Monitor WBC    Outcome: Progressing     Problem: SAFETY ADULT  Goal: Patient will remain free of falls  Description: INTERVENTIONS:  - Educate patient/family on patient safety including physical limitations  - Instruct patient to call for assistance with activity   - Consult OT/PT to assist with strengthening/mobility   - Keep Call bell within reach  - Keep bed low and locked with side rails adjusted as appropriate  - Keep care items and personal belongings within reach  - Initiate and maintain comfort rounds  - Make Fall Risk Sign visible to staff  - Offer Toileting every  Hours, in advance of need  - Initiate/Maintain larm  - Obtain necessary fall risk management equipment:   - Apply yellow socks and bracelet for high fall risk patients  - Consider moving patient to room near nurses station  Outcome: Progressing  Goal: Maintain or return to baseline ADL function  Description: INTERVENTIONS:  -  Assess patient's ability to carry out ADLs; assess patient's baseline for ADL function and identify physical deficits which impact ability to perform ADLs (bathing, care of mouth/teeth, toileting, grooming, dressing, etc.)  - Assess/evaluate cause of self-care deficits   - Assess range of motion  - Assess patient's mobility; develop plan if impaired  - Assess patient's need for assistive devices and provide as appropriate  - Encourage maximum independence but intervene and supervise when necessary  - Involve family in performance of ADLs  - Assess for home care needs following discharge   - Consider OT consult to assist with ADL evaluation and planning for discharge  - Provide patient education as appropriate  Outcome: Progressing  Goal: Maintains/Returns to pre admission functional level  Description: INTERVENTIONS:  - Perform BMAT or MOVE assessment daily.   - Set and communicate daily mobility goal to care team and patient/family/caregiver. - Collaborate with rehabilitation services on mobility goals if consulted  - Perform Range of Motion  times a day. - Reposition patient every hours.   - Dangle patient  times a day  - Stand patient times a day  - Ambulate patient times a day  - Out of bed to chair times a day   - Out of bed for meals  times a day  - Out of bed for toileting  - Record patient progress and toleration of activity level   Outcome: Progressing

## 2023-10-03 NOTE — TREATMENT TEAM
10/03/23 0740   Team Meeting   Meeting Type Daily Rounds   Initial Conference Date 10/03/23   Team Members Present   Team Members Present Physician;Nurse;;; Occupational Therapist   Physician Team Member Dr. Octavio Reagan Team Member [address] [facility], [address] [facility] Management Team Member [address] [place] [address] Work Team Member Claudia   OT Team Member Dianna Harrison   Patient/Family Present   Patient Present No   Patient's Family Present No     Patient had an episode of flipping chairs, cursing, shouting, and crying last night due to not getting requested gown, CPAP, and medication within 5 minutes of asking. Patient's d/c moved to Friday due to outburst, if the patient has any other aggressive outbursts, patient will not be d/c this week.

## 2023-10-04 PROCEDURE — 99232 SBSQ HOSP IP/OBS MODERATE 35: CPT | Performed by: STUDENT IN AN ORGANIZED HEALTH CARE EDUCATION/TRAINING PROGRAM

## 2023-10-04 RX ORDER — LURASIDONE HYDROCHLORIDE 20 MG/1
40 TABLET, FILM COATED ORAL
Status: DISCONTINUED | OUTPATIENT
Start: 2023-10-04 | End: 2023-10-06 | Stop reason: HOSPADM

## 2023-10-04 RX ADMIN — DIVALPROEX SODIUM 750 MG: 250 TABLET, DELAYED RELEASE ORAL at 08:30

## 2023-10-04 RX ADMIN — LIDOCAINE 1 PATCH: 50 PATCH CUTANEOUS at 08:32

## 2023-10-04 RX ADMIN — LOSARTAN POTASSIUM 50 MG: 50 TABLET, FILM COATED ORAL at 08:30

## 2023-10-04 RX ADMIN — LEVOTHYROXINE SODIUM 125 MCG: 125 TABLET ORAL at 05:56

## 2023-10-04 RX ADMIN — TOPIRAMATE 200 MG: 100 TABLET, FILM COATED ORAL at 08:29

## 2023-10-04 RX ADMIN — CLONIDINE HYDROCHLORIDE 0.1 MG: 0.1 TABLET ORAL at 08:31

## 2023-10-04 RX ADMIN — DIVALPROEX SODIUM 1000 MG: 500 TABLET, DELAYED RELEASE ORAL at 20:33

## 2023-10-04 RX ADMIN — CLONIDINE HYDROCHLORIDE 0.1 MG: 0.1 TABLET ORAL at 20:33

## 2023-10-04 RX ADMIN — NALTREXONE HYDROCHLORIDE 50 MG: 50 TABLET, FILM COATED ORAL at 08:31

## 2023-10-04 RX ADMIN — LURASIDONE HYDROCHLORIDE 40 MG: 20 TABLET, FILM COATED ORAL at 17:33

## 2023-10-04 RX ADMIN — FUROSEMIDE 20 MG: 20 TABLET ORAL at 08:31

## 2023-10-04 RX ADMIN — AMLODIPINE BESYLATE 5 MG: 5 TABLET ORAL at 08:31

## 2023-10-04 RX ADMIN — PANTOPRAZOLE SODIUM 20 MG: 20 TABLET, DELAYED RELEASE ORAL at 05:56

## 2023-10-04 RX ADMIN — LURASIDONE HYDROCHLORIDE 40 MG: 40 TABLET, COATED ORAL at 08:29

## 2023-10-04 RX ADMIN — HYDROXYZINE HYDROCHLORIDE 50 MG: 50 TABLET, FILM COATED ORAL at 20:34

## 2023-10-04 RX ADMIN — OXYBUTYNIN CHLORIDE 5 MG: 5 TABLET ORAL at 08:31

## 2023-10-04 RX ADMIN — METOPROLOL TARTRATE 25 MG: 25 TABLET, FILM COATED ORAL at 20:33

## 2023-10-04 RX ADMIN — OXYBUTYNIN CHLORIDE 5 MG: 5 TABLET ORAL at 17:03

## 2023-10-04 RX ADMIN — ESTRADIOL 0.5 MG: 1 TABLET ORAL at 08:30

## 2023-10-04 RX ADMIN — CHOLECALCIFEROL TAB 25 MCG (1000 UNIT) 1000 UNITS: 25 TAB at 08:30

## 2023-10-04 RX ADMIN — METOPROLOL TARTRATE 25 MG: 25 TABLET, FILM COATED ORAL at 08:30

## 2023-10-04 RX ADMIN — MEMANTINE 10 MG: 10 TABLET ORAL at 08:31

## 2023-10-04 RX ADMIN — WARFARIN SODIUM 5 MG: 5 TABLET ORAL at 17:03

## 2023-10-04 RX ADMIN — TOPIRAMATE 200 MG: 100 TABLET, FILM COATED ORAL at 17:03

## 2023-10-04 RX ADMIN — BUPROPION HYDROCHLORIDE 300 MG: 300 TABLET, EXTENDED RELEASE ORAL at 08:30

## 2023-10-04 NOTE — TREATMENT TEAM
Pt attended al l groups; Claude Metz Pt indicated she needs to work on her anger and was remorseful for outburst she had on unit that delayed her d/c. Pt hopeful of d/c. Pt noted she plans on getting back into her guitars. 10/04/23 1000   Activity/Group Checklist   Group Community meeting   Attendance Attended   Attendance Duration (min) 31-45   Interactions Interacted appropriately   Affect/Mood Appropriate   Goals Achieved Identified feelings; Identified relapse prevention strategies; Discussed coping strategies; Increased hopefulness; Able to listen to others; Able to engage in interactions; Able to reflect/comment on own behavior;Able to manage/cope with feelings;Verbalized increased hopefulness; Able to self-disclose; Able to recieve feedback

## 2023-10-04 NOTE — PLAN OF CARE
Problem: INFECTION - ADULT  Goal: Absence or prevention of progression during hospitalization  Description: INTERVENTIONS:  - Assess and monitor for signs and symptoms of infection  - Monitor lab/diagnostic results  - Monitor all insertion sites, i.e. indwelling lines, tubes, and drains  - Monitor endotracheal if appropriate and nasal secretions for changes in amount and color  - [place] appropriate cooling/warming therapies per order  - Administer medications as ordered  - Instruct and encourage patient and family to use good hand hygiene technique  - Identify and instruct in appropriate isolation precautions for identified infection/condition  Outcome: Progressing  Goal: Absence of fever/infection during neutropenic period  Description: INTERVENTIONS:  - Monitor WBC    Outcome: Progressing     Problem: SAFETY ADULT  Goal: Patient will remain free of falls  Description: INTERVENTIONS:  - Educate patient/family on patient safety including physical limitations  - Instruct patient to call for assistance with activity   - Consult OT/PT to assist with strengthening/mobility   - Keep Call bell within reach  - Keep bed low and locked with side rails adjusted as appropriate  - Keep care items and personal belongings within reach  - Initiate and maintain comfort rounds  - Make Fall Risk Sign visible to staff  - Offer Toileting every  Hours, in advance of need  - Initiate/Maintain alarm  - Obtain necessary fall risk management equipment:   - Apply yellow socks and bracelet for high fall risk patients  - Consider moving patient to room near nurses station  Outcome: Progressing  Goal: Maintain or return to baseline ADL function  Description: INTERVENTIONS:  -  Assess patient's ability to carry out ADLs; assess patient's baseline for ADL function and identify physical deficits which impact ability to perform ADLs (bathing, care of mouth/teeth, toileting, grooming, dressing, etc.)  - Assess/evaluate cause of self-care deficits   - Assess range of motion  - Assess patient's mobility; develop plan if impaired  - Assess patient's need for assistive devices and provide as appropriate  - Encourage maximum independence but intervene and supervise when necessary  - Involve family in performance of ADLs  - Assess for home care needs following discharge   - Consider OT consult to assist with ADL evaluation and planning for discharge  - Provide patient education as appropriate  Outcome: Progressing  Goal: Maintains/Returns to pre admission functional level  Description: INTERVENTIONS:  - Perform BMAT or MOVE assessment daily.   - Set and communicate daily mobility goal to care team and patient/family/caregiver. - Collaborate with rehabilitation services on mobility goals if consulted  - Perform Range of Motion  times a day. - Reposition patient every hours.   - Dangle patient  times a day  - Stand patient  times a day  - Ambulate patient  times a day  - Out of bed to chair  times a day   - Out of bed for meals times a day  - Out of bed for toileting  - Record patient progress and toleration of activity level   Outcome: Progressing

## 2023-10-04 NOTE — PLAN OF CARE
Pt did not attend group when prompted or offered.      Problem: Alteration in Thoughts and Perception  Goal: Attend and participate in unit activities, including therapeutic, recreational, and educational groups  Description: Interventions:  -Encourage Visitation and family involvement in care  Outcome: Progressing

## 2023-10-04 NOTE — NURSING NOTE
Pt visible on the unit and interacts with peers. Pt is pleasant ,cooperative and attends groups. Pt takes medications as scheduled and has good intake at meals time. Pt denies SI/HI/AVH including depression and anxiety. Pt offers no concerns at this time.

## 2023-10-04 NOTE — TREATMENT TEAM
10/04/23 0724   Team Meeting   Meeting Type Daily Rounds   Initial Conference Date 10/04/23   Team Members Present   Team Members Present Physician;Nurse;;; Occupational Therapist   Physician Team Member Dr. Gracie Perry Team Member [facility] Management Team Member [address] [place] [address] Work Team Member Claudia   OT Team Member Efren Haines   Patient/Family Present   Patient Present No   Patient's Family Present No     The patient is pleasant, social, remaining in good behavioral control. MD adjusted medications. Planned d/c home on Friday 10/6/23 as long as the patient remains in good behavioral control.

## 2023-10-04 NOTE — PROGRESS NOTES
Progress Note - Keaton Sanders 47 y.o. female MRN: [id number]  Unit/Bed#: Hari Roberson 795-68 Encounter: [id number]       Patient was visited on unit for continuing care; chart reviewed and discussed with multidisciplinary treatment team. On approach, the patient was calm, pleasant and cooperative. Reported feeling better this morning with better frustration tolerance. She noted that changing the timing of her medication helped her to sleep well and feels refreshed today. Denied any changes in appetite, and energy level. Denied A/VH currently. Denied SI/HI, intent or plan upon direct inquiry at this time. Patient continues to be visible in the milieu and interacts with staff and peers. No reports of aggression or self-injurious behavior on unit. No PRN medications used in the past 24 hours except Atarax 50 mg at 10 AM yesterday morning for anxiety. Patient accepted all offered medications and reported feeling better. No adverse effects of medications noted or reported. The patient received Haldol Dec 50 mg IM on 9/27/23. Maintained on Wellbutrin  mg daily, Depakote 750 mg nightly and Topamax 200 mg BID. She was started on Naltrexone 25 mg daily for impulsivity which uptitrated to 50 mg daily. Also, was started on Clonidine 0.1 mg BID on 9/30/23 for impulsivity and poor frustration tolerance.  VPA level was 27 this morning. Depakote increased to 750/1000 mg on 10/2/23 and VPA level to be checked on Thursday. The patient was restarted on Latuda 40 mg daily and does uptitrated to 40 mg BID on 10/4/23; doses to be adjusted as indicated.      Current Mental Status Evaluation:  Appearance and attitude: appeared as stated age, casually dressed, tattooed, with good hygiene  Eye contact: good  Motor Function: within normal limits, intact gait, No PMA/PMR  Gait/station: normal gait/station and normal balance  Speech: normal for rate, rhythm, volume, latency, amount  Language: No overt abnormality  Mood/affect: less dysphoric / Affect was constricted but reactive, mood congruent, brighter  Thought Processes: linear  Thought content: denies suicidal ideation or homicidal ideation; no delusions or first rank symptoms  Associations: concrete associations  Perceptual disturbances: denies Auditory/Visual/Tactile Hallucinations  Orientation: oriented to time, person, place and to the situational context  Cognitive Function: intact  Memory: recent and remote memory grossly intact  Intellect: unable to assess  Fund of knowledge: aware of current events, aware of past history and vocabulary average  Impulse control: good  Insight/judgment: fair/fair    Pain: denied  Pain scale: 0      Vital signs in last 24 hours:    Temp:  [97.5 °F (36.4 °C)-97.8 °F (36.6 °C)] 97.8 °F (36.6 °C)  HR:  [61-72] 61  Resp:  [16-17] 16  BP: (109-128)/(57-71) 128/70    Laboratory results: I have personally reviewed all pertinent laboratory/tests results    Results from the past 24 hours: No results found for this or any previous visit (from the past 24 hour(s)).     Progress Toward Goals: improving    Assessment:  Principal Problem:    Schizoaffective disorder, bipolar type (HCC)  Active Problems:    Borderline personality disorder (HCC)    Polysubstance abuse (HCC)    Yan's esophagus determined by biopsy    Benign essential hypertension    Hypothyroidism    MAG (obstructive sleep apnea)    Overactive bladder    Sjogren's syndrome (HCC)    COPD (chronic obstructive pulmonary disease) (HCC)    Mixed hyperlipidemia    Medical clearance for psychiatric admission    Chronic tension-type headache, intractable    Contusion of elbow    History of pulmonary embolism    Contact dermatitis and eczema    Morbid obesity with BMI of 45.0-49.9, adult (HCC)    Restless leg syndrome        Plan:  - f/u SLIM recs regarding the medical problems  - Check trough VPA level tomorrow  - Continue medication titration and treatment plan; adjust medication to optimize treatment response and as clinically indicated.      Scheduled medications:  Current Facility-Administered Medications   Medication Dose Route Frequency Provider Last Rate   • albuterol  2 puff Inhalation Q4H PRN JHONATAN Berg     • amLODIPine  5 mg Oral Daily JHONATAN Berg     • buPROPion  300 mg Oral Daily JHNOATAN Nicholson     • cholecalciferol  1,000 Units Oral Daily JHONATAN Nicholson     • cloNIDine  0.1 mg Oral Q12H [address] Raman Khan MD     • cyproheptadine  4 mg Oral TID PRN JHONATAN Berg     • Diclofenac Sodium  2 g Topical 4x Daily PRN JHONATAN Berg     • divalproex sodium  1,000 mg Oral HS Raman Khan MD     • divalproex sodium  750 mg Oral Daily Raman Khan MD     • estradiol  0.5 mg Oral Daily JHONATAN Berg     • furosemide  20 mg Oral Daily JHONATAN Berg     • hydrocortisone   Topical 4x Daily PRN JHONATAN Berg     • hydrOXYzine HCL  100 mg Oral Q6H PRN Raman Khan MD     • hydrOXYzine HCL  25 mg Oral Q6H PRN Max 4/day JHONATAN Nicholson     • hydrOXYzine HCL  50 mg Oral Q6H PRN Max 4/day LAURA NicholsonNP     • ibuprofen  200 mg Oral Q6H PRN JHONATAN Nicholson     • ibuprofen  400 mg Oral Q6H PRN JHONATAN Nicholson     • ibuprofen  600 mg Oral Q8H PRN JHONATAN Nicholson     • levothyroxine  125 mcg Oral Early Morning JHONATAN Berg     • lidocaine  1 patch Topical Daily JHONATAN Berg     • LORazepam  1 mg Intramuscular Q6H PRN Max 3/day JHONATAN Nicholson     • losartan  50 mg Oral Daily JHONATAN Berg     • lurasidone  40 mg Oral Daily With Breakfast Raman Khan MD     • lurasidone  40 mg Oral Daily With Latesha Leon MD     • memantine  10 mg Oral Daily Dianna Gerow-Herron, CRNP     • metoprolol tartrate  25 mg Oral Q12H [address] JHONATAN Huffman     • naltrexone  50 mg Oral Daily Raman Khan MD     • OLANZapine  5 mg Intramuscular Q3H PRN Max 3/day Hien Quirk, CRNP     • OLANZapine  2.5 mg Oral Q4H PRN Max 6/day Hien Quirk, CRNP     • OLANZapine  5 mg Oral Q4H PRN Max 3/day Hien Quirk, CRNP     • OLANZapine  5 mg Oral Q3H PRN Max 3/day Hien Quirk, CRNP     • oxybutynin  5 mg Oral BID JHONATAN Berg     • pantoprazole  20 mg Oral Early Morning JHONATAN Berg     • polyethylene glycol  17 g Oral Daily PRN Hien Quirk, CRNP     • rOPINIRole  1 mg Oral HS PRN Michael Castrejon, JHONATAN     • senna-docusate sodium  1 tablet Oral Daily PRN Hien Quirk, CRNP     • topiramate  200 mg Oral BID Hien Quirk, CRNP     • traZODone  50 mg Oral HS PRN Hien Quirk, CRNP     • warfarin  5 mg Oral Daily (warfarin) JHONATAN Berg          PRN:  •  albuterol  •  cyproheptadine  •  Diclofenac Sodium  •  hydrocortisone  •  hydrOXYzine HCL  •  hydrOXYzine HCL  •  hydrOXYzine HCL  •  ibuprofen  •  ibuprofen  •  ibuprofen  •  LORazepam  •  OLANZapine  •  OLANZapine  •  OLANZapine  •  OLANZapine  •  polyethylene glycol  •  rOPINIRole  •  senna-docusate sodium  •  traZODone    - Observation: routine    - VS: as per unit protocol  - Diet: Regular diet  - Psychoeducation (benefits and potential risks) discussed, importance of compliance with the psychiatric treatment reiterated, and the patient verbalized understanding of the matter  - Encourage group attendance and milieu therapy    - The pt was educated and agreed to verbalize any suicidal thoughts, frustrations or concerns to the nursing staff, immediately.     - Dispo: TBD       Next of Kin  · Extended Emergency Contact Information  · Primary Emergency Contact: Luigi Mason  · Address: [address] [place] [address]  ·          [address]  · Home Phone: [phone]  · Mobile Phone: [phone]  · Relation: Father  · Secondary Emergency Contact: Leonor Ji  · Address: [address] [place] [address] [address] [facility]  ·          [facility], [address] [facility]  · Home Phone: [phone]  · Mobile Phone: [phone]  · Relation: Friend      Counseling / Coordination of Care  Patient's progress discussed with staff in treatment team meeting. Medications, treatment progress and treatment plan reviewed with patient. Medication changes discussed with patient. Supportive therapy provided to patient. Cognitive techniques utilized during the session. Reassurance and supportive therapy provided. Reoriented to reality and reassured. Encouraged participation in milieu and group therapy on the unit. Crisis/safety plan discussed with patient.      Valentin Valle MD  Attending [address]

## 2023-10-04 NOTE — CASE MANAGEMENT
The patient has the follow OP appts scheduled:    Oct13 CONSULT HE with JHONATAN Paniagua  Friday Oct 13, 2023 1:00 PM (Arrive by 12:45 PM)  Please arrive 15 minutes before appointment time  [facility], [address], [place], [address]  [phone]    [id number] Primary Care Office Visit with JHONATAN Hollingsworth  Wednesday Oct 18, 2023 10:00 AM (Arrive by 9:45 AM)  [address] [facility], [address] [place] [address], [id number] [phone]    [id number] OFFICE VISIT with Hugo Avila PA-C  Thursday Oct 19, 2023 10:00 AM Darrion Alex by 9:45 AM)  Neurology [facility], [address] [place] [address],   [address] [place] [address], [id number] [phone]        The patient will also continued to be followed by [facility] ACT team.

## 2023-10-04 NOTE — PROGRESS NOTES
10/04/23 1100   Activity/Group Checklist   Group Wellness  (guieded progressive muscle relaxation.)   Attendance Attended   Attendance Duration (min) 16-30   Interactions Interacted appropriately  (sarcastic  yet appropriately social and jovial.Well focused in relaxation task.)   Affect/Mood Appropriate;Calm   Goals Achieved Able to engage in interactions; Displayed empathy;Discussed self-esteem issues

## 2023-10-05 LAB
ALBUMIN SERPL BCP-MCNC: 3.6 G/DL (ref 3.5–5)
ALP SERPL-CCNC: 42 U/L (ref 34–104)
ALT SERPL W P-5'-P-CCNC: 12 U/L (ref 7–52)
ANION GAP SERPL CALCULATED.3IONS-SCNC: 9 MMOL/L
AST SERPL W P-5'-P-CCNC: 11 U/L (ref 13–39)
BILIRUB SERPL-MCNC: 0.24 MG/DL (ref 0.2–1)
BUN SERPL-MCNC: 16 MG/DL (ref 5–25)
CALCIUM SERPL-MCNC: 8.5 MG/DL (ref 8.4–10.2)
CHLORIDE SERPL-SCNC: 106 MMOL/L (ref 96–108)
CO2 SERPL-SCNC: 23 MMOL/L (ref 21–32)
CREAT SERPL-MCNC: 0.58 MG/DL (ref 0.6–1.3)
GFR SERPL CREATININE-BSD FRML MDRD: 107 ML/MIN/1.73SQ M
GLUCOSE P FAST SERPL-MCNC: 94 MG/DL (ref 65–99)
GLUCOSE SERPL-MCNC: 94 MG/DL (ref 65–140)
INR PPP: 2.66 (ref 0.84–1.19)
POTASSIUM SERPL-SCNC: 4 MMOL/L (ref 3.5–5.3)
PROT SERPL-MCNC: 6.1 G/DL (ref 6.4–8.4)
PROTHROMBIN TIME: 28.6 SECONDS (ref 11.6–14.5)
SODIUM SERPL-SCNC: 138 MMOL/L (ref 135–147)
VALPROATE SERPL-MCNC: 71 UG/ML (ref 50–100)

## 2023-10-05 PROCEDURE — 80164 ASSAY DIPROPYLACETIC ACD TOT: CPT | Performed by: STUDENT IN AN ORGANIZED HEALTH CARE EDUCATION/TRAINING PROGRAM

## 2023-10-05 PROCEDURE — 85610 PROTHROMBIN TIME: CPT | Performed by: NURSE PRACTITIONER

## 2023-10-05 PROCEDURE — 80053 COMPREHEN METABOLIC PANEL: CPT | Performed by: STUDENT IN AN ORGANIZED HEALTH CARE EDUCATION/TRAINING PROGRAM

## 2023-10-05 PROCEDURE — 99232 SBSQ HOSP IP/OBS MODERATE 35: CPT | Performed by: STUDENT IN AN ORGANIZED HEALTH CARE EDUCATION/TRAINING PROGRAM

## 2023-10-05 RX ORDER — WARFARIN SODIUM 2 MG/1
4 TABLET ORAL
Status: DISCONTINUED | OUTPATIENT
Start: 2023-10-05 | End: 2023-10-06 | Stop reason: HOSPADM

## 2023-10-05 RX ADMIN — DIVALPROEX SODIUM 1000 MG: 500 TABLET, DELAYED RELEASE ORAL at 20:17

## 2023-10-05 RX ADMIN — AMLODIPINE BESYLATE 5 MG: 5 TABLET ORAL at 08:33

## 2023-10-05 RX ADMIN — TOPIRAMATE 200 MG: 100 TABLET, FILM COATED ORAL at 08:33

## 2023-10-05 RX ADMIN — DIVALPROEX SODIUM 750 MG: 250 TABLET, DELAYED RELEASE ORAL at 08:33

## 2023-10-05 RX ADMIN — OXYBUTYNIN CHLORIDE 5 MG: 5 TABLET ORAL at 17:02

## 2023-10-05 RX ADMIN — LOSARTAN POTASSIUM 50 MG: 50 TABLET, FILM COATED ORAL at 08:32

## 2023-10-05 RX ADMIN — CHOLECALCIFEROL TAB 25 MCG (1000 UNIT) 1000 UNITS: 25 TAB at 08:32

## 2023-10-05 RX ADMIN — FUROSEMIDE 20 MG: 20 TABLET ORAL at 08:34

## 2023-10-05 RX ADMIN — LIDOCAINE 1 PATCH: 50 PATCH CUTANEOUS at 08:34

## 2023-10-05 RX ADMIN — BUPROPION HYDROCHLORIDE 300 MG: 300 TABLET, EXTENDED RELEASE ORAL at 08:32

## 2023-10-05 RX ADMIN — NALTREXONE HYDROCHLORIDE 50 MG: 50 TABLET, FILM COATED ORAL at 08:34

## 2023-10-05 RX ADMIN — LURASIDONE HYDROCHLORIDE 40 MG: 20 TABLET, FILM COATED ORAL at 17:02

## 2023-10-05 RX ADMIN — PANTOPRAZOLE SODIUM 20 MG: 20 TABLET, DELAYED RELEASE ORAL at 06:39

## 2023-10-05 RX ADMIN — OXYBUTYNIN CHLORIDE 5 MG: 5 TABLET ORAL at 08:33

## 2023-10-05 RX ADMIN — CLONIDINE HYDROCHLORIDE 0.1 MG: 0.1 TABLET ORAL at 20:16

## 2023-10-05 RX ADMIN — METOPROLOL TARTRATE 25 MG: 25 TABLET, FILM COATED ORAL at 08:33

## 2023-10-05 RX ADMIN — METOPROLOL TARTRATE 25 MG: 25 TABLET, FILM COATED ORAL at 20:17

## 2023-10-05 RX ADMIN — ESTRADIOL 0.5 MG: 1 TABLET ORAL at 08:32

## 2023-10-05 RX ADMIN — MEMANTINE 10 MG: 10 TABLET ORAL at 08:32

## 2023-10-05 RX ADMIN — WARFARIN SODIUM 4 MG: 2 TABLET ORAL at 17:02

## 2023-10-05 RX ADMIN — CLONIDINE HYDROCHLORIDE 0.1 MG: 0.1 TABLET ORAL at 08:34

## 2023-10-05 RX ADMIN — LEVOTHYROXINE SODIUM 125 MCG: 125 TABLET ORAL at 06:39

## 2023-10-05 RX ADMIN — LURASIDONE HYDROCHLORIDE 40 MG: 40 TABLET, COATED ORAL at 08:38

## 2023-10-05 RX ADMIN — TOPIRAMATE 200 MG: 100 TABLET, FILM COATED ORAL at 17:02

## 2023-10-05 NOTE — DISCHARGE SUMMARY
Discharge Summary - [address] 47 y.o. female MRN: [id number]  Unit/Bed#: [facility] 548-92 Encounter: [id number]     Admission Date: 9/28/2023         Discharge Date: 10/06/2023     Attending Psychiatrist: Shreyas Ureña MD    Reason for Admission/HPI:     Per HPI from admission H&P obtained by Dr. Shreyas Ureña on 09/29/2023:    "Ashlyn Marin is a 47 y.o.  female, single (no children), working with people with disability (3 days/ week, 7h/day), domiciled alone with her cat w/ PMH of multiple medical conditions including obesity (BMI: 52), MAG (on CPAP), COPD, h/o PE,  HTN, hypothyroidism, Sjogren's syndrome, overactive bladder, GERD, chronic LBP, DDD, migraine, and PPH of Schizoaffective disorder, Borderline Personality Disorder, polysubstance abuse (methamphetamines [radha since 9/2/23 as per patient), THC, alcohol), multiple prior psychiatric admissions, multiple prior SA / gestures, h/o self-injurious behavior who was BIB EMS activated by the patient following SA via OD on 25 tablets of Flexaril 10 mg on 9/26/23. The patient was evaluated by psychiatry consult on 9/27/23. She signed 201 and got admitted to the inpatient psychiatry unit 6B for further psychiatric stabilization.       The pt was visited on the unit; chart reviewed. On approach, the patient was irritable, yelling in her room, ruminating on her clothes being lost, stated that she is not willing to talk and wanted to sign 72 h letter, making threatening gestures to the writer and tried to slam the door when the writer was entering the room. The patient was verbally redirectable and did not require IM meds at the time, later apologized the writer and became more pleasant and cooperative.      The patient reported feeling more depressed over a month in the context of multiple psychosocial stressors including medical issues, stressors at work and noncompliance with her medication for about 2 weeks.   She noted that she took a leave of absence around her mother's death anniversary on August 18 and then went back to work temporarily but had to take medical leave due to pulmonary emboli. She went back to work on Monday and worked for 2 days but reportedly felt very overwhelmed and stressed out at work. She also noted that she started the program for substance abuse at [facility] and only attended 1 group which was overwhelming to the patient. She stated that she was not able to handle the stressors, overwhelmed with her situation and when she was reported the online with [facility], she overdosed on Flexaril, and then she called 911 was brought into the hospital.  She admitted poor frustration tolerance getting angry and irritable easily and also reported poor attention and focus and as per patient her psychiatrist wanted to start her on Strattera for possible ADHD. She reported interrupted sleep despite using the CPAP machine waking up every 2 hours or so but sleeps for 6 to 7 hours on average. She denied any auditory hallucination lately but reported very visual hallucinations. She noted that she had a full-blown manic episode earlier in summer but was not able to remember the exact time. When the patient expressed distress over her clothes reportedly being lost in the Emergency Department, she was asked if she still harbored thoughts of wanting to die and why the loss of her clothes upset her. She responded, Clide Soja were my favorite clothes. I don’t want to die anymore.” Denied SI/HI, intent or plan upon direct inquiry at this time. The patient agreed to verbalize any suicidal thoughts, frustrations or concerns to the nursing staff, immediately.     She noted that loss of her mother in hospice care was traumatic to her and she has recurrent memories but denied any flashbacks or nightmares. She denied any history of eating disorder.   She reported having "OCD", stated that everything should be in certain way and if someone messed up with her desk she gets frustrated at work I would not be able to work until fix her desk. She also reported washing her hands multiple times especially while at work.      She smokes "medical marijuana". Has a h/o methamphetamine use disorder, reportedly clean since 9/2/23. Denied drinking alcohol (last use was a couple of years ago as per patient) or other illicit substance use.      The patient received Haldol Dec 50 mg IM on 9/27/23. Maintained on Wellbutrin  mg daily, Depakote 750 mg nightly and Topamax 200 mg BID. She was started on Naltrexone 25 mg daily for impulsivity. List of meds to be confirmed by the ACT team, and doses to be adjusted as indicated."       Social History     Tobacco History     Smoking Status  Former Smoking Start Date  1/1/1985 Quit Date  1/2/2018 Smoking Frequency  2.00 packs/day for 30.00 years (60.00 ttl pk-yrs)    Smoking Tobacco Type  Cigarettes from 1/1/1985 to 1/2/2018    Smokeless Tobacco Use  Never    Tobacco Comments  Started at age 14. Alcohol History     Alcohol Use Status  Not Currently Drinks/Week  0 Glasses of wine, 0 Cans of beer, 0 Shots of liquor, 0 Standard drinks or equivalent per week Comment  Recovering alcoholic          Drug Use     Drug Use Status  Yes Types  Marijuana, Methamphetamines Comment  Trying to get off meth          Sexual Activity     Sexually Active  Not Currently Partners  Male Comment  defer          Activities of Daily Living    Not Asked               Additional Substance Use Detail     Questions Responses    Problems Due to Past Use of Alcohol?  No    Substance Use Assessment Substance use within the past 12 months    Alcohol Use Frequency Denies use in past 12 months    Cannabis frequency Daily    Comment: 3 or more times/week on 9/12/2018 3 or more times/week -> Daily on 7/21/2022     Heroin Frequency Denies use in past 12 months    Alcohol Drink of Choice wine coolers    Cannabis method Smoke    Comment: Smoke on 9/12/2018 1st Use of Alcohol 16years old    Cannabis 3t Use 16years old    Last Use of Alcohol & Amount 3 years ago 1 wine cooler    Cannabis last use 7/13/22    Comment: Has a medical marijuana card     Longest Abstinence from Alcohol 3 years    Cannabis Longest Abstinence unclear    Cocaine frequency 3 or more times/week    Comment: Never used on 12/22/2021 Never used -> 3 or more times/week on 7/21/2022     Crack Cocaine Frequency Denies use in past 12 months    Methamphetamine Frequency Experimented    Methamphetamine Method Snort    Cocaine First Use 16years old    Methamphetamine 3t Use 37years old    Cocaine last use     Comment: quit in her 29's     Methamphetamine Last Use & Amount 7/13/22    Cocaine Longest Abstinence Over 10-20 years    Methamphetamine Longest Abstinence unclear    Narcotic Frequency Denies use in past 12 months    Benzodiazepine Frequency Denies use in past 12 months    Amphetamine frequency Denies use in past 12 months    Barbituate Frequency Denies use use in past 12 months    Inhalant frequency Never used    Comment: Never used on 7/6/2021     Hallucinogen frequency Never used    Comment: Never used on 7/6/2021     Ecstasy frequency Never used    Comment: Never used on 7/6/2021     Other drug frequency Never used    Comment: Never used on 12/22/2021     Opiate frequency Denies use in past 12 months    Not reviewed.           Past Medical History:   Diagnosis Date   • Anxiety    • Arthritis     oa cassandra knees   • Asthma     good control- no medications   • Yan's esophagus    • Bipolar affective disorder ([address])    • Cannabis abuse with unspecified 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[address])    • Chronic pain disorder     lumbar   • COPD (chronic obstructive pulmonary disease) ([facility])    • CPAP (continuous positive airway pressure) dependence    • Degenerative disc disease at L5-S1 level    • Deliberate self-cutting     9/15/22per pt has not done recently-- does see a therapist and psychiatrist regularly • Depression 09/16/2008   • Disease of thyroid gland     hypo   • MARTINEZ (dyspnea on exertion)     per pt "has had this with exertion and not new"   • Drug overdose 10/28/2008    suicide attempt and again drug overdose 7/2022--SL -Medical floor and than transferred to [facility]   • Dysphagia    • Dyspnea    • Edema     BLE   • Family history of blood clots    • GERD (gastroesophageal reflux disease)    • Headache(784.0)    • Headache, tension-type    • High blood pressure    • History of COVID-19 12/30/2020    Symptoms started on 12/30/2020 and then got worse. Today she is feeling a little bit better. She is a candidate for monoclonal antibody infusion and set for 1/6/21 @ 1pm at [facility]. 01/07/21 - telemedicine -    • Hyperlipidemia    • Hypertension    • Knee pain, bilateral     Especially right   • Medical marijuana use    • Memory loss    • Migraines    • Obesity    • Overactive bladder    • Sjogren's disease ([address])    • Sleep apnea    • Stress incontinence    • Suicidal ideations    • Teeth missing    • Tremor     per pt Tremors of Right Leg and both Arms   • Visual impairment    • Wears glasses      Past Surgical History:   Procedure Laterality Date   • BREAST CYST EXCISION Right 1989   • CARPAL TUNNEL RELEASE Left    • CHOLECYSTECTOMY  05/2003    Laparoscopic   • COLONOSCOPY      01/12/2009   • DILATION AND CURETTAGE OF UTERUS     • ELBOW SURGERY Left     x2.  No hardware   • ESOPHAGOGASTRODUODENOSCOPY     • FOOT SURGERY Left     Plantar fasciotomy   • HERNIA REPAIR     • HYSTERECTOMY      laporoscopic, partial   • KNEE ARTHROSCOPY Bilateral    • OOPHORECTOMY Left 10/2015   • VT GASTROCNEMIUS RECESSION Left 02/24/2021    Procedure: RECESSION GASTROC OPEN;  Surgeon: Quinn Schwab, DPM;  Location: [address] [place] [address] OR;  Service: Podiatry   • VT RPR UMBILICAL HRNA 5 YRS/> REDUCIBLE N/A 04/24/2019    Procedure: REPAIR HERNIA UMBILICAL LAPAROSCOPIC W/ ROBOTICS;  Surgeon: Nuvia Raygoza MD;  Location: [facility] OR;  Service: General   • IA SPHINCTEROTOMY ANAL DIVISION SPHINCTER 44 [facility] N/A 08/31/2018    Procedure: EUA, LEFT PARTIAL INTERNAL SPHINCTEROTOMY;  Surgeon: Pablito Leos MD;  Location: [facility] MAIN OR;  Service: Colorectal   • IA TNOT ELBOW LATERAL/MEDIAL DEBRIDE OPEN TDN RPR Left 09/20/2022    Procedure: RELEASE EPICONDYLAR ELBOW MEDIAL;  Surgeon: Duran Osborn MD;  [facility]: [facility] MAIN OR;  Service: Orthopedics   • REDUCTION MAMMAPLASTY Bilateral 1999   • REPAIR LACERATION Left     left hand-5/18/2009   • REPLACEMENT TOTAL KNEE Right    • ROTATOR CUFF REPAIR Left    • TONSILECTOMY AND ADNOIDECTOMY     • US GUIDED MSK PROCEDURE  04/22/2021   • VASCULAR SURGERY     • VEIN LIGATION Bilateral    • WISDOM TOOTH EXTRACTION         Medications: All current active medications have been reviewed. Medications prior to admission:    Prior to Admission Medications   Prescriptions Last Dose Informant Patient Reported? Taking?    Cholecalciferol (Vitamin D3) 125 MCG (5000 UT) CAPS 9/28/2023 Self Yes Yes   Sig: Take by mouth in the morning   Diclofenac Sodium (VOLTAREN) 1 % Not Taking Self No No   Sig: Apply 2 g topically 3 (three) times a day as needed (pain) For pain to affected area   Patient not taking: Reported on 9/28/2023   Diclofenac Sodium (VOLTAREN) 1 % Not Taking Self No No   Sig: Apply 2 g topically 4 (four) times a day   Patient not taking: Reported on 9/28/2023   Fesoterodine Fumarate ER (Toviaz) 4 MG TB24 9/28/2023 Self Yes Yes   Sig: Take 1 tablet by mouth daily   Lurasidone HCl 60 MG TABS  Self No No   Sig: Take 1 tablet (60 mg total) by mouth 2 (two) times a day TAKEN IN THE MORNING AND AT NIGHT-----Latuda   RABEprazole (ACIPHEX) 20 MG tablet 9/28/2023 Self Yes Yes   Sig: Take 20 mg by mouth 2 (two) times a day   albuterol (Ventolin HFA) 90 mcg/act inhaler 9/28/2023 Self No Yes   Sig: Inhale 2 puffs every 6 (six) hours as needed for wheezing   amLODIPine (NORVASC) 5 mg tablet 9/28/2023 Self No Yes   Sig: TAKE 1 TABLET (5 MG TOTAL) BY MOUTH DAILY   ammonium lactate (LAC-HYDRIN) 12 % cream Not Taking Self Yes No   Patient not taking: Reported on 9/28/2023   buPROPion (WELLBUTRIN XL) 150 mg 24 hr tablet  Self No No   Sig: Take 1 tablet (150 mg total) by mouth daily   Patient not taking: Reported on 9/22/2023   buPROPion (WELLBUTRIN XL) 300 mg 24 hr tablet 9/28/2023 Self Yes Yes   Sig: Take 300 mg by mouth daily   carBAMazepine (CARBATROL) 300 MG 12 hr capsule 9/28/2023 Self Yes Yes   Sig: Take 1 capsule by mouth in the morning   clobetasol (TEMOVATE) 0.05 % ointment Not Taking Self No No   Sig: Apply to affected areas sparingly. Do not exceed 2 weeks of treatment. Patient not taking: Reported on 9/28/2023   colestipol (COLESTID) 1 g tablet  Self No No   Sig: Take 1 tablet (1 g total) by mouth 2 (two) times a day   cyproheptadine (PERIACTIN) 4 mg tablet Not Taking Self Yes No   Patient not taking: Reported on 9/28/2023   divalproex sodium (DEPAKOTE) 250 mg EC tablet  Self No No   Sig: Take 3 tablets (750 mg total) by mouth every 12 (twelve) hours   ergocalciferol (ERGOCALCIFEROL) 1.25 MG (59274 UT) capsule Not Taking Self Yes No   Patient not taking: Reported on 9/28/2023   estradiol (ESTRACE) 0.5 MG tablet 9/28/2023 Self No Yes   Sig: Take 1 tablet (0.5 mg total) by mouth daily   fluticasone (FLOVENT HFA) 110 MCG/ACT inhaler Not Taking Self Yes No   Sig: Inhale 2 puffs as needed Rinse mouth after use.    Patient not taking: Reported on 9/28/2023   furosemide (LASIX) 20 mg tablet 9/28/2023 Self No Yes   Sig: Take 1 tablet (20 mg total) by mouth daily   haloperidol decanoate (Haldol Decanoate) 50 mg/mL injection 9/27/2023 Self Yes Yes   Sig: every 30 (thirty) days   hydrOXYzine HCL (ATARAX) 50 mg tablet Past Week Self No Yes   Sig: Take 1 tablet (50 mg total) by mouth daily at bedtime   hydrocortisone 2.5 % ointment Past Month Self No Yes   Sig: Apply topically 2 (two) times a day   levothyroxine (Euthyrox) 125 mcg tablet 9/28/2023 Self No Yes   Sig: Take 1 tablet (125 mcg total) by mouth daily in the early morning   losartan (COZAAR) 50 mg tablet 9/28/2023 Self No Yes   Sig: TAKE ONE TABLET BY MOUTH DAILY   memantine (NAMENDA) 10 mg tablet 9/28/2023 Self Yes Yes   metoprolol tartrate (LOPRESSOR) 25 mg tablet 9/28/2023 Self No Yes   Sig: TAKE ONE TABLET BY MOUTH EVERY 12 HOURS   ondansetron (ZOFRAN-ODT) 4 mg disintegrating tablet Not Taking Self No No   Sig: Take 1 tablet (4 mg total) by mouth every 6 (six) hours as needed for nausea or vomiting   Patient not taking: Reported on 9/28/2023   pilocarpine (SALAGEN) 5 mg tablet 9/28/2023 Self Yes Yes   Sig: Take 5 mg by mouth 2 (two) times a day   rOPINIRole (REQUIP) 1 mg tablet 9/28/2023 Self Yes Yes   topiramate (TOPAMAX) 100 mg tablet  Self No No   Sig: Take 1.5 tablets (150 mg total) by mouth 2 (two) times a day   Patient taking differently: Take 200 mg by mouth 2 (two) times a day   topiramate (TOPAMAX) 200 MG tablet   Yes No   trospium chloride (SANCTURA) 20 mg tablet 9/28/2023 Self Yes Yes   Sig: Take 20 mg by mouth 2 (two) times a day   venlafaxine (EFFEXOR) 37.5 mg tablet 9/28/2023 Self Yes Yes   Sig: Take 37.5 mg by mouth daily   warfarin (COUMADIN) 6 mg tablet 9/28/2023 Self No Yes   Sig: Take 6mg (one tablet) daily every day by mouth. Facility-Administered Medications: None       Allergies: Allergies   Allergen Reactions   • Percocet [Oxycodone-Acetaminophen] Headache     Severe headaches   (denies issues with Tylenol)   • Povidone Iodine Rash     Unsure if betadine or gauze post surgical. Got rash on leg.    Has  Had itchy rashes after every surgery prep and IV insertion   • Medical Tape Hives   • Chlorhexidine Rash     Per pt "able to use the liquid soap--thinkd reaction from the sponges or wipes"       Objective     Vital signs in last 24 hours:    Temp:  [97.1 °F (36.2 °C)-97.5 °F (36.4 °C)] 97.5 °F (36.4 °C)  HR:  [61-70] 62  Resp:  [16] 16  BP: (106126)/(59-72) 126/72      Intake/Output Summary (Last 24 hours) at 10/5/2023 1232  Last data filed at 10/5/2023 1213  Gross per 24 hour   Intake 1940 ml   Output --   Net 1940 ml       Hospital Course:     Igor Du was admitted to the inpatient psychiatric unit and started on Behavioral Health checks every 7 minutes. During the hospitalization she was attending individual therapy, group therapy, milieu therapy and occupational therapy. [facility] medications were adjusted over the hospital stay. To address depressive symptoms, mood instability, irritability, self-abusive behavior, flashbacks, nightmares and insomnia, Igor Du was treated with antidepressant Wellbutrin XL, mood stabilizer Depakote ER and Topamax, antipsychotic medication Latuda and Haldol Decanoate, anxiolytic medication Clonidine and medication to control impulsivity Naltrexone. Medication doses were adjusted during the hospital course. Wellbutrin XL was continued at 300mg PO daily. Depakote ER was adjusted to 750mg PO daily and 1000mg PO at HS (last VPA level 71 on 10/4/2023). Topamax was continued at 200mg PO BID. Ab[place] was adjusted to 40mg BID (with breakfast and dinner). Haldol Decanoate was continued at 50mg IM every 30 days, she last received prior to admission on 9/27/2023. She will continue with this after discharge through her ACT team. Naltrexone was added and titrated to 50mg PO daily. Clonidine was added and adjusted to 0.1mg PO every 12 hours.  Prior to beginning of treatment medications risks and benefits and possible side effects including risk of liver impairment related to treatment with Depakote, risk of parkinsonian symptoms, Tardive Dyskinesia and metabolic syndrome related to treatment with antipsychotic medications, risk of cardiovascular events in elderly related to treatment with antipsychotic medications and risk of impaired next-day mental alertness, complex sleep-related behavior and dependence related to treatment with hypnotic medications were reviewed with Wilmon Hammans. She verbalized understanding and agreement for treatment. Upon admission Wilmon Hammans was seen by medical service for medical clearance for inpatient treatment and medical follow up. Luiss symptoms slowly improved over the hospital course. Initially after admission she was still feeling depressed, anxious, frustrated, overwhelmed, labile and irritable. With adjustment of medications and therapeutic milieu her symptoms slowly improved. At the end of treatment Wilmon Hammans was more stable. Her mood was more stable at the time of discharge. Wilmon Hammans denied suicidal ideation, intent or plan at the time of discharge and denied homicidal ideation, intent or plan at the time of discharge. There was no overt psychosis at the time of discharge. Wilmon Hammans was participating appropriately in milieu at the time of discharge. Behavior was appropriate on the unit at the time of discharge. Sleep and appetite were improved. Since Wilmon Hammans was doing well at the end of the hospitalization, treatment team felt that she could be safely discharged to outpatient care. The outpatient follow up with {[facility] Hospital Course Follow up:41872} was arranged by the unit  upon discharge.     Mental Status at Time of Discharge:     Appearance:  {EFO EXAM; GENERAL IGQVE:99312}   Behavior:  {EFO SL IP Exam; behavior:41636}   Speech:  {EFO SL IP findings; speech:35794}   Mood:  {EFO EXAM; MOOD LESS/MORE:48376}   Affect:  {EFO AFFECT LESS/MORE:92853}   Thought Process:  {EFO MISC; THOUGHT PROCESS:76664}   Associations: {EFO THOUGHT ASSOCIATIONS:91370}   Thought Content:  {EFO SL IP Exam; psych thought content:38032}   Perceptual Disturbances: {EFO Perceptual Disturbances:96622}   Risk Potential: Suicidal ideation - {EFO SI/HI with/without plan:98756}  Homicidal ideation - {EFO HI with/without plan:96499}  Potential for aggression - {Potential for aggression:46032::"No"}   Sensorium:  {EFO ORIENTED/DISORIENTED:47419}   Memory: {EFO EXAM; PSYCH COGNITION:03238}   Consciousness:  {EFO Consciousness:23046::"alert","awake"}   Attention/Concentration: {EFO EXAM; NEURO PED ATTENTION:18995}   Insight:  {EFO EXAM; PSYCH INSIGHT/JUDGEMENT:98778}   Judgment: {EFO EXAM; PSYCH INSIGHT/JUDGEMENT:90404}   Gait/Station: {EFO SL IP  Gait/Station:48530}   Motor Activity: {EFO SL IP Psych Motor Activity:22913::"no abnormal movements"}       Admission Diagnosis:    Principal Problem:    Schizoaffective disorder, bipolar type ([address])  Active Problems:    Yan's esophagus determined by biopsy    Benign essential hypertension    Hypothyroidism    MAG (obstructive sleep apnea)    Overactive bladder    Sjogren's syndrome (HCC)    COPD (chronic obstructive pulmonary disease) (HCC)    Mixed hyperlipidemia    Medical clearance for psychiatric admission    Chronic tension-type headache, intractable    Contusion of elbow    Borderline personality disorder ([address])    History of pulmonary embolism    Contact dermatitis and eczema    Polysubstance abuse ([address])    Morbid obesity with BMI of 45.0-49.9, adult (HCC)    Restless leg syndrome      Discharge Diagnosis:     Principal Problem:    Schizoaffective disorder, bipolar type ([address])  Active Problems:    Yan's esophagus determined by biopsy    Benign essential hypertension    Hypothyroidism    MAG (obstructive sleep apnea)    Overactive bladder    Sjogren's syndrome (HCC)    COPD (chronic obstructive pulmonary disease) (HCC)    Mixed hyperlipidemia    Medical clearance for psychiatric admission    Chronic tension-type headache, intractable    Contusion of elbow    Borderline personality disorder ([address])    History of pulmonary embolism    Contact dermatitis and eczema    Polysubstance abuse ([address])    Morbid obesity with BMI of 45.0-49.9, adult (HCC)    Restless leg syndrome  Resolved Problems:    * No resolved hospital problems.  *      Lab Results:   I have personally reviewed all pertinent laboratory/tests results. Most Recent Labs:   Lab Results   Component Value Date    WBC 8.72 09/29/2023    RBC 4.40 09/29/2023    HGB 13.2 09/29/2023    HCT 41.8 09/29/2023     09/29/2023    RDW 13.9 09/29/2023    TOTANEUTABS 7.51 09/20/2023    NEUTROABS 5.46 09/29/2023    SODIUM 138 10/05/2023    K 4.0 10/05/2023     10/05/2023    CO2 23 10/05/2023    BUN 16 10/05/2023    CREATININE 0.58 (L) 10/05/2023    GLUC 94 10/05/2023    GLUF 94 10/05/2023    CALCIUM 8.5 10/05/2023    AST 11 (L) 10/05/2023    ALT 12 10/05/2023    ALKPHOS 42 10/05/2023    TP 6.1 (L) 10/05/2023    ALB 3.6 10/05/2023    TBILI 0.24 10/05/2023    CHOLESTEROL 234 (H) 03/09/2023    HDL 71 03/09/2023    TRIG 153 (H) 03/09/2023    LDLCALC 132 (H) 03/09/2023    [address] 163 03/09/2023    VALPROICTOT 71 10/05/2023    AMMONIA 32 03/09/2023    [id number] 7.002 (H) 09/29/2023    FREET4 0.84 09/29/2023    T3FREE 2.27 (L) 10/16/2019    PREGUR negative 07/13/2022    PREGSERUM Negative 09/06/2023    RPR Non-Reactive 07/17/2022    HGBA1C 5.0 01/05/2023    EAG 97 01/05/2023       Discharge Medications:    See after visit summary for all reconciled discharge medications provided to patient and family. Discharge instructions/Information to patient and family:     See after visit summary for information provided to patient and family. Provisions for Follow-Up Care:    See after visit summary for information related to follow-up care and any pertinent home health orders. Discharge Statement:    I spent 35 minutes discharging the patient. This time was spent on the day of discharge. I had direct contact with the patient on the day of discharge. Additional documentation is required if more than 30 minutes were spent on discharge:    I reviewed with Wilmon Hammans importance of compliance with medications and outpatient treatment after discharge. I discussed the medication regimen and possible side effects of the medications with Wilmon Hammans prior to discharge.  At the time of discharge she was tolerating psychiatric medications. I discussed outpatient follow up with Teresa Quispe. I reviewed with Teresa Quispe crisis plan and safety plan upon discharge. I discussed with Teresa Quispe recommendation to follow up with outpatient drug and alcohol counseling and AA meetings. Teresa Quispe agreed to abstain from drug and alcohol use after discharge. Teresa Quispe was competent to understand risks and benefits of withholding information and risks and benefits of her actions. Discharge on Two Antipsychotic Medications: Yes - Teresa Quispe is being discharged on 2 antipsychotic agents (Latuda and Haldol Decanoate) due to the history of at least 3 antipsychotic medication trials and failure of multiple trials of monotherapy.     [address] JHONATAN Figueroa 10/05/23

## 2023-10-05 NOTE — PLAN OF CARE
Pt attends groups and visible.      Problem: Ineffective Coping  Goal: Participates in unit activities  Description: Interventions:  - Provide therapeutic environment   - Provide required programming   - Redirect inappropriate behaviors   Outcome: Progressing

## 2023-10-05 NOTE — PROGRESS NOTES
Progress Note - Keaton Sanders 47 y.o. female MRN: [id number]  Unit/Bed#: [facility] Monday 384-20 Encounter: [id number]    Patient was seen today for continuation of care, records reviewed and patient was discussed with the morning case review team. Per staff, Urszula Wagner has been in good behavioral control. She is compliant with meals and medications. She has been attending and participating in groups. Urszula Wagner was seen today for psychiatric follow-up. On assessment today, Urszula Wagner was seen talking to peers in the group room and requested to have interview in the group room. Urszula Wagner was bright, pleasant and cooperative with the interview. She states she is looking forward to discharge tomorrow. She is future thinking and goal oriented. We discussed current medications and she has an understanding. She reports having follow up with her treatment team on Monday. She is hoping to go out for lunch tomorrow. Urszula Wagner had no complaints today and feels her mood is "good". Urszula Wagner denies acute suicidal/self-harm ideation/intent/plan upon direct inquiry at this time. Ursuzla Wagner remains behaviorally appropriate, no agitation or aggression noted on exam or in report. Urszula Wagner also denies HI/AH/VH, and does not appear overtly manic. No overt delusions or paranoia are verbalized. Urszula Wagner remains adherent to her current psychotropic medication regimen and denies any side effects from medications, as well as none noted on exam.    Urszula Wagner has remain in behavioral control, we will plan for discharge tomorrow. Continue Wellbutrin  mg daily, Depakote 750 mg daily and 1000mg PO at HS (last VPA 10/5/2023 was 71), Topamax 200 mg BID, Naltrexone 50 mg daily, Clonidine 0.1 mg BID,  Latuda 40 mg daily BID on 10/4/23. She will also continue Haldol Decanoate 50mg q 30 days which she last received 9/27/2023.      Vitals:  Vitals:    10/05/23 0831   BP: 126/72   Pulse:    Resp:    Temp:    SpO2:        Laboratory Results:    I have personally reviewed all pertinent laboratory/tests results.   Most Recent Labs:   Lab Results   Component Value Date    WBC 8.72 09/29/2023    RBC 4.40 09/29/2023    HGB 13.2 09/29/2023    HCT 41.8 09/29/2023     09/29/2023    RDW 13.9 09/29/2023    TOTANEUTABS 7.51 09/20/2023    NEUTROABS 5.46 09/29/2023    SODIUM 138 10/05/2023    K 4.0 10/05/2023     10/05/2023    CO2 23 10/05/2023    BUN 16 10/05/2023    CREATININE 0.58 (L) 10/05/2023    GLUC 94 10/05/2023    GLUF 94 10/05/2023    CALCIUM 8.5 10/05/2023    AST 11 (L) 10/05/2023    ALT 12 10/05/2023    ALKPHOS 42 10/05/2023    TP 6.1 (L) 10/05/2023    ALB 3.6 10/05/2023    TBILI 0.24 10/05/2023    CHOLESTEROL 234 (H) 03/09/2023    HDL 71 03/09/2023    TRIG 153 (H) 03/09/2023    LDLCALC 132 (H) 03/09/2023    [address] 163 03/09/2023    VALPROICTOT 71 10/05/2023    AMMONIA 32 03/09/2023    TYA9CBQEHNEC 7.002 (H) 09/29/2023    FREET4 0.84 09/29/2023    T3FREE 2.27 (L) 10/16/2019    PREGUR negative 07/13/2022    PREGSERUM Negative 09/06/2023    RPR Non-Reactive 07/17/2022    HGBA1C 5.0 01/05/2023    EAG 97 01/05/2023       Psychiatric Review of Systems:  Behavior over the last 24 hours:  improved  Sleep: adequate  Appetite: adequate  Medication side effects: denies  ROS: no complaints, denies shortness of breath or chest pain and all other systems are negative for acute changes    Mental Status Evaluation:    Appearance:  casually dressed, adequate grooming   Behavior:  pleasant, cooperative, calm   Speech:  normal rate and volume, fluent, clear   Mood:  "good"   Affect:  appropriate   Thought Process:  goal directed, linear   Associations: intact associations   Thought Content:  no overt delusions   Perceptual Disturbances: denies auditory or visual hallucinations when asked, does not appear responding to internal stimuli   Risk Potential: Suicidal ideation - None  Homicidal ideation - None  Potential for aggression - No   Sensorium:  oriented to person, place, time/date and situation   Memory:  recent and remote memory grossly intact   Consciousness:  alert and awake   Attention/Concentration: attention span and concentration are age appropriate   Insight:  improving   Judgment: improving   Gait/Station: normal gait/station   Motor Activity: no abnormal movements     Progress Toward Goals:   Michelle Brewster is progressing towards goals of inpatient psychiatric treatment by continued medication compliance and is attending therapeutic modalities on the milieu. However, the patient continues to require inpatient psychiatric hospitalization for continued medication management and titration to optimize symptom reduction, improve sleep hygiene, and demonstrate adequate self-care.     Risk of Harm to Self:   Nursing Suicide Risk Assessment Last 24 hours: C-SSRS Risk (Since Last Contact)  Calculated C-SSRS Risk Score (Since Last Contact): No Risk Indicated  Current Specific Risk Factors include: diagnosis of mood disorder  Protective Factors: no current suicidal ideation, ability to communicate with staff on the unit, able to contract for safety on the unit, taking medications as ordered on the unit, improved mood, responds to redirection  Based on today's assessment, Michelle Brewster presents the following risk of harm to self: minimal    Risk of Harm to Others:  Nursing Homicide Risk Assessment: Violence Risk to Others: Denies within past 6 months  Current Specific Risk Factors include: diagnosis of mood disorder  Protective Factors: no current homicidal ideation, impulse control is improving, improved mood, compliant with medications on the unit as ordered, compliant with unit milieu, follows staff redirection  Based on today's assessment, Michelle Brewster presents the following risk of harm to others: minimal    The following interventions are recommended: behavioral checks every 7 minutes, continued hospitalization on locked unit      Assessment/Plan   Principal Problem:    Schizoaffective disorder, bipolar type [facility])  Active Problems:    Yan's esophagus determined by biopsy    Benign essential hypertension    Hypothyroidism    MAG (obstructive sleep apnea)    Overactive bladder    Sjogren's syndrome (HCC)    COPD (chronic obstructive pulmonary disease) ([facility])    Mixed hyperlipidemia    Medical clearance for psychiatric admission    Chronic tension-type headache, intractable    Contusion of elbow    Borderline personality disorder ([address])    History of pulmonary embolism    Contact dermatitis and eczema    Polysubstance abuse ([address])    Morbid obesity with BMI of 45.0-49.9, adult ([address])    Restless leg syndrome      Recommended Treatment: Treatment plan and medication changes discussed and per the attending physician the plan is: 1. Continue with group therapy, milieu therapy and occupational therapy  2. Behavioral Health checks every 7 minutes  3. Continue frequent safety checks and vitals per unit protocol  4. Continue with SLIM medical management as indicated  5. Continue with current medication regimen  6. Will review labs in the a.m. 7.Disposition Planning: Discharge planning and efforts remain ongoing    Behavioral Health Medications: all current active meds have been reviewed and continue current psychiatric medications.   Current Facility-Administered Medications   Medication Dose Route Frequency Provider Last Rate   • albuterol  2 puff Inhalation Q4H PRN JHONATAN Berg     • amLODIPine  5 mg Oral Daily JHONATAN Berg     • buPROPion  300 mg Oral Daily JHONATAN Houser     • cholecalciferol  1,000 Units Oral Daily JHONATAN Houser     • cloNIDine  0.1 mg Oral Q12H Tenzin Sargent MD     • cyproheptadine  4 mg Oral TID PRN JHONATAN Berg     • Diclofenac Sodium  2 g Topical 4x Daily PRN JHONATAN Berg     • divalproex sodium  1,000 mg Oral HS Roel Schmidt MD     • divalproex sodium  750 mg Oral Daily Roel Schmidt MD     • estradiol  0.5 mg Oral Daily JHONATAN Berg     • furosemide  20 mg Oral Daily JHONATAN Berg     • hydrocortisone   Topical 4x Daily PRN JHONATAN Berg     • hydrOXYzine HCL  100 mg Oral Q6H PRN Tarun Suarez MD     • hydrOXYzine HCL  25 mg Oral Q6H PRN Max 4/day Davied Phenes, CRNP     • hydrOXYzine HCL  50 mg Oral Q6H PRN Max 4/day Davied Phenes, CRNP     • ibuprofen  200 mg Oral Q6H PRN Davied Phenes, CRNP     • ibuprofen  400 mg Oral Q6H PRN Davied Phenes, CRNP     • ibuprofen  600 mg Oral Q8H PRN Davied Phenes, CRNP     • levothyroxine  125 mcg Oral Early Morning JHONATAN Berg     • lidocaine  1 patch Topical Daily JHONATAN Berg     • LORazepam  1 mg Intramuscular Q6H PRN Max 3/day Davied Phenes, CRNP     • losartan  50 mg Oral Daily JHONATAN Berg     • lurasidone  40 mg Oral Daily With Breakfast Tarun Suarez MD     • lurasidone  40 mg Oral Daily With Jhonny Rios MD     • memantine  10 mg Oral Daily JHONATAN Berg     • metoprolol tartrate  25 mg Oral Q12H [address] JHONATAN Berg     • naltrexone  50 mg Oral Daily Tarun Suarez MD     • OLANZapine  5 mg Intramuscular Q3H PRN Max 3/day Davied Phenes, CRNP     • OLANZapine  2.5 mg Oral Q4H PRN Max 6/day Davied Phenes, CRNP     • OLANZapine  5 mg Oral Q4H PRN Max 3/day Davied Phenes, CRNP     • OLANZapine  5 mg Oral Q3H PRN Max 3/day Davied Phenes, CRNP     • oxybutynin  5 mg Oral BID JHONATAN Berg     • pantoprazole  20 mg Oral Early Morning JHONATAN Berg     • polyethylene glycol  17 g Oral Daily PRN Davied Phenes, CRNP     • rOPINIRole  1 mg Oral HS PRN JHONATAN Berg     • senna-docusate sodium  1 tablet Oral Daily PRN Davied Phenes, CRNP     • topiramate  200 mg Oral BID Davied Phenes, CRNP     • traZODone  50 mg Oral HS PRN JHONATAN Gaspar         Risks / Benefits of Treatment:  Risks, benefits, and possible side effects of medications explained to patient and patient verbalizes understanding and agreement for treatment. Counseling / Coordination of Care:  Patient's progress reviewed with nursing staff. Medications, treatment progress and treatment plan reviewed with patient. Supportive counseling provided to the patient. Total floor/unit time spent today 25 minutes. Greater than 50% of total time was spent with the patient and / or family counseling and / or coordination of care. A description of the counseling / coordination of care: medication education, treatment plan, supportive therapy.

## 2023-10-05 NOTE — PROGRESS NOTES
10/05/23 1330   Activity/Group Checklist   Group Life Skills  (52 things to try once in life.)   Attendance Attended  (joined late apologizing for having napped too long.)   Attendance Duration (min) 0-15   Interactions Interacted appropriately  (pt.  shared that she is learning how to play the guitar and is proud of self for being able to play atleast one song.)   Affect/Mood Appropriate;Calm   Goals Achieved Able to engage in interactions; Able to self-disclose; Able to reflect/comment on own behavior

## 2023-10-05 NOTE — DISCHARGE INSTR - OTHER ORDERS
You are being discharged home. [facility]: [address]  [address] [facility], [address]  [address] [facility]  [phone] ([phone])  A 24/7 toll-free number for individuals in [facility] who are seeking a listening ear for additional support in their recovery from mental illness. [address] [place] [address] is a unique social rehabilitation program. It is a place to come and relax and have some fun, meet new people and chat with friends, while having a snack and taking part in the various activities of the [facility]. Each month we invite guest speakers who provide education on various topics of interest. Together, we publish a bi-monthly newsletter that contains our monthly activity calendars. We support one another and share life experiences in recovery. We have a van run daily for those who are unable to get to and from the [facility] on their own. If you require transportation, please call before 3 PM on the day transportation is needed for . Come join us and have some fun. Some of the outings we offer are: bowling, miniature golf, going out to eat at different restaurants, and shopping at different thrift stores in the neighboring community. At the [address], we have holiday parties, Evan Leventhal, workshops and discussion groups on various topics     Friends of the [facility] are active participants in [address] [place] [address], from our [employer] to our newsletter and volunteer program.     All are welcome to share ideas, goals, and/or objectives that might be of interest to friends attending the   [facility].      A Casual Week At The [facility]:     Monday  Community Outings  Speakers  Tuesday  [employer] and Anish on alternate weeks. Wednesday  [employer]  Games  Thursday  [address]  and [facility]  Friday  Movie and [employer]  Monthly Birthday Parties  Recovery Partnership  [address] [facility] [place] [facility], [address], [address]  [phone]  Hours  Monday-Friday 12pm- 8pm  Ana Luisa Marta transportation provided Tuesday through Friday. HOW TO GET SUBSTANCE ABUSE HELP:  If you or someone you know has a drug or alcohol problem, there is help:  [address]: [address]: [phone]  An assessment is the first step. In addition to those listed there are other programs available in the area but assessment is best to determine an appropriate level of care. If you DO NOT have Medical Assistance (MA) or [employer], an assessment can be scheduled at one of these providers:  [address] [facility]  [address] [facility], [address] [place] [address]  [phone]   [facility]  [address] [facility],2 And 3 S Floors., [address]  [address] [facility]. MIK, [address] [facility]  [address] [facility]  [address] COLEMAN, [facility]   Step by [address]  [address] [facility]., [address]  [address] [facility]., [address]  [phone]     If you [address], an assessment can be scheduled at one of these providers:  [place] on Alcohol & Drug Abuse  [facility]., [address]  830 [facility]  [address] [facility], [address] [place] [address]  150 [facility] D&A Intake Unit  [address]  1113 [facility], 1st Floor, Gabriel HOUSER  [phone]  1 [facility], [facility] [address] [facility]  1700 [facility],2 And 3 S Floors., [address]  2501 [facility]  [facility]. [facility], [address]  [phone]   NET ([address])  [address] 1801 [facility], [facility], [facility]  [address]  [address], [facility]   Step by 112 [address] [facility], [facility]  [address] [facility], [address] [facility]  1002 [facility] [address] [facility], [address] [facility]  [phone]     If you [address] [facility], an assessment can be scheduled at one of these providers. Please contact these Providers to determine if they are in your network plan:  [facility] D&A Intake Unit  [address] [facility] [address] 1113 [facility], 1st Floor, [facility], [facility]  [address] [place] [address],2 And 3 S Floors., [facility], [address] [facility]  [phone]   [address] [facility]. [facility], [address]  [phone]   NET ([address] [facility] [facility] [address])  [address]  1801 [facility], [facility], [facility]  [address]  [address] [place] [address] [address] [facility]., [facility], [address]

## 2023-10-05 NOTE — DISCHARGE INSTR - APPOINTMENTS
Pamela Marin or Rosemarie, our [place] [employer] [place], will be calling you after your discharge, on the phone number that you provided. They will be available as an additional support, if needed. If you wish to speak with one of them, you may contact Pamela Marin at [phone] or Rosemary Hughes at [phone].

## 2023-10-05 NOTE — TREATMENT TEAM
10/05/23 0748   Team Meeting   Meeting Type Daily Rounds   Initial Conference Date 10/05/23   Team Members Present   Team Members Present Physician;Nurse;;; Occupational Therapist   Physician Team Member Dr. Sandrine Henson Member [facility] [address] Management Team Member [address] [place] [address] Work Team Member Claudia   OT Team Member Marianna Marie   Patient/Family Present   Patient Present No   Patient's Family Present No     Cooperative, medication compliant, participates in groups. The patient has remained in good behavioral control and will d/c tomorrow at 11h back home.

## 2023-10-05 NOTE — CASE MANAGEMENT
CM spoke to the patient who reported she was looking forward to leaving tomorrow 10/6/23. The patient reported her father would be coming at 7h35 tomorrow to pick her up. CM confirmed the patient has an LV ACT doctor's appt on Monday 10/9/23. The patient reported no questions or concerns regarding her d/c plan.

## 2023-10-05 NOTE — TREATMENT TEAM
Pt attended community meeting. Pt was able to remain in behavioral control and able to stay for duration of group. Another peer had to be removed from group who was making negative comments. Pt did put head down and remain constricted after initially engaging in group process. Alerted Nursing who assisted pt and provide further support and acknowledgement of pt working toward d/c.     10/05/23 1000   Activity/Group Checklist   Group Community meeting   Attendance Attended   Attendance Duration (min) 31-45   Interactions Other (Comment)  (understood redirection)   Affect/Mood Constricted   Goals Achieved Identified feelings; Discussed coping strategies; Able to listen to others; Able to engage in interactions; Able to manage/cope with feelings; Able to self-disclose; Able to recieve feedback

## 2023-10-05 NOTE — NURSING NOTE
Pt is visible on the unit and social with peers. Pt takes medication as scheduled and has good intake meals time. Pt denies anxiety ,depression including HI/SI/AVH at this time. Pt reported looking forward to discharged tomorrow. Pt offers no concern this shift.

## 2023-10-05 NOTE — PLAN OF CARE
Problem: INFECTION - ADULT  Goal: Absence or prevention of progression during hospitalization  Description: INTERVENTIONS:  - Assess and monitor for signs and symptoms of infection  - Monitor lab/diagnostic results  - Monitor all insertion sites, i.e. indwelling lines, tubes, and drains  - Monitor endotracheal if appropriate and nasal secretions for changes in amount and color  - Hope appropriate cooling/warming therapies per order  - Administer medications as ordered  - Instruct and encourage patient and family to use good hand hygiene technique  - Identify and instruct in appropriate isolation precautions for identified infection/condition  Outcome: Progressing  Goal: Absence of fever/infection during neutropenic period  Description: INTERVENTIONS:  - Monitor WBC    Outcome: Progressing     Problem: SAFETY ADULT  Goal: Patient will remain free of falls  Description: INTERVENTIONS:  - Educate patient/family on patient safety including physical limitations  - Instruct patient to call for assistance with activity   - Consult OT/PT to assist with strengthening/mobility   - Keep Call bell within reach  - Keep bed low and locked with side rails adjusted as appropriate  - Keep care items and personal belongings within reach  - Initiate and maintain comfort rounds  - Make Fall Risk Sign visible to staff  - Offer Toileting every  Hours, in advance of need  - Initiate/Maintain alarm  - Obtain necessary fall risk management equipment:   - Apply yellow socks and bracelet for high fall risk patients  - Consider moving patient to room near nurses station  Outcome: Progressing  Goal: Maintain or return to baseline ADL function  Description: INTERVENTIONS:  -  Assess patient's ability to carry out ADLs; assess patient's baseline for ADL function and identify physical deficits which impact ability to perform ADLs (bathing, care of mouth/teeth, toileting, grooming, dressing, etc.)  - Assess/evaluate cause of self-care deficits   - Assess range of motion  - Assess patient's mobility; develop plan if impaired  - Assess patient's need for assistive devices and provide as appropriate  - Encourage maximum independence but intervene and supervise when necessary  - Involve family in performance of ADLs  - Assess for home care needs following discharge   - Consider OT consult to assist with ADL evaluation and planning for discharge  - Provide patient education as appropriate  Outcome: Progressing  Goal: Maintains/Returns to pre admission functional level  Description: INTERVENTIONS:  - Perform BMAT or MOVE assessment daily.   - Set and communicate daily mobility goal to care team and patient/family/caregiver. - Collaborate with rehabilitation services on mobility goals if consulted  - Perform Range of Motion  times a day. - Reposition patient every hours.   - Dangle patient  times a day  - Stand patient  times a day  - Ambulate patient times a day  - Out of bed to chair  times a day   - Out of bed for meals times a day  - Out of bed for toileting  - Record patient progress and toleration of activity level   Outcome: Progressing

## 2023-10-06 VITALS
OXYGEN SATURATION: 96 % | BODY MASS INDEX: 47.09 KG/M2 | RESPIRATION RATE: 16 BRPM | HEIGHT: 66 IN | SYSTOLIC BLOOD PRESSURE: 139 MMHG | HEART RATE: 62 BPM | WEIGHT: 293 LBS | DIASTOLIC BLOOD PRESSURE: 69 MMHG | TEMPERATURE: 97.3 F

## 2023-10-06 DIAGNOSIS — J44.9 CHRONIC OBSTRUCTIVE PULMONARY DISEASE, UNSPECIFIED COPD TYPE (HCC): Primary | ICD-10-CM

## 2023-10-06 PROCEDURE — 99239 HOSP IP/OBS DSCHRG MGMT >30: CPT | Performed by: PSYCHIATRY & NEUROLOGY

## 2023-10-06 RX ORDER — NALTREXONE HYDROCHLORIDE 50 MG/1
50 TABLET, FILM COATED ORAL DAILY
Qty: 30 TABLET | Refills: 0 | Status: SHIPPED | OUTPATIENT
Start: 2023-10-06 | End: 2023-11-05

## 2023-10-06 RX ORDER — LIDOCAINE 50 MG/G
1 PATCH TOPICAL DAILY
Refills: 0
Start: 2023-10-06

## 2023-10-06 RX ORDER — PANTOPRAZOLE SODIUM 20 MG/1
20 TABLET, DELAYED RELEASE ORAL
Qty: 30 TABLET | Refills: 0 | Status: SHIPPED | OUTPATIENT
Start: 2023-10-06 | End: 2023-11-05

## 2023-10-06 RX ORDER — CLONIDINE HYDROCHLORIDE 0.1 MG/1
0.1 TABLET ORAL EVERY 12 HOURS SCHEDULED
Qty: 60 TABLET | Refills: 0 | Status: SHIPPED | OUTPATIENT
Start: 2023-10-06 | End: 2023-11-05

## 2023-10-06 RX ORDER — DIVALPROEX SODIUM 500 MG/1
1000 TABLET, DELAYED RELEASE ORAL
Qty: 60 TABLET | Refills: 0 | Status: SHIPPED | OUTPATIENT
Start: 2023-10-06 | End: 2023-11-05

## 2023-10-06 RX ORDER — FUROSEMIDE 20 MG/1
20 TABLET ORAL DAILY
Qty: 30 TABLET | Refills: 3 | Status: SHIPPED | OUTPATIENT
Start: 2023-10-06

## 2023-10-06 RX ORDER — LURASIDONE HYDROCHLORIDE 40 MG/1
40 TABLET, FILM COATED ORAL
Qty: 30 TABLET | Refills: 0 | Status: SHIPPED | OUTPATIENT
Start: 2023-10-06 | End: 2023-11-05

## 2023-10-06 RX ORDER — WARFARIN SODIUM 4 MG/1
4 TABLET ORAL
Qty: 30 TABLET | Refills: 0 | Status: SHIPPED | OUTPATIENT
Start: 2023-10-06 | End: 2023-11-05

## 2023-10-06 RX ORDER — BUPROPION HYDROCHLORIDE 300 MG/1
300 TABLET ORAL DAILY
Qty: 30 TABLET | Refills: 0 | Status: SHIPPED | OUTPATIENT
Start: 2023-10-06 | End: 2023-11-05

## 2023-10-06 RX ORDER — OXYBUTYNIN CHLORIDE 5 MG/1
5 TABLET ORAL 2 TIMES DAILY
Qty: 60 TABLET | Refills: 0 | Status: SHIPPED | OUTPATIENT
Start: 2023-10-06 | End: 2023-11-05

## 2023-10-06 RX ORDER — MEMANTINE HYDROCHLORIDE 10 MG/1
10 TABLET ORAL DAILY
Qty: 30 TABLET | Refills: 0 | Status: SHIPPED | OUTPATIENT
Start: 2023-10-06 | End: 2023-11-05

## 2023-10-06 RX ORDER — DIVALPROEX SODIUM 250 MG/1
750 TABLET, DELAYED RELEASE ORAL DAILY
Qty: 90 TABLET | Refills: 0 | Status: SHIPPED | OUTPATIENT
Start: 2023-10-06 | End: 2023-11-05

## 2023-10-06 RX ORDER — LEVOTHYROXINE SODIUM 0.12 MG/1
125 TABLET ORAL
Qty: 30 TABLET | Refills: 0 | Status: SHIPPED | OUTPATIENT
Start: 2023-10-06 | End: 2023-11-05

## 2023-10-06 RX ORDER — MELATONIN
1000 DAILY
Qty: 30 TABLET | Refills: 0 | Status: SHIPPED | OUTPATIENT
Start: 2023-10-06 | End: 2023-11-05

## 2023-10-06 RX ADMIN — LURASIDONE HYDROCHLORIDE 40 MG: 40 TABLET, COATED ORAL at 08:07

## 2023-10-06 RX ADMIN — ESTRADIOL 0.5 MG: 1 TABLET ORAL at 08:04

## 2023-10-06 RX ADMIN — NALTREXONE HYDROCHLORIDE 50 MG: 50 TABLET, FILM COATED ORAL at 08:08

## 2023-10-06 RX ADMIN — CHOLECALCIFEROL TAB 25 MCG (1000 UNIT) 1000 UNITS: 25 TAB at 08:07

## 2023-10-06 RX ADMIN — LOSARTAN POTASSIUM 50 MG: 50 TABLET, FILM COATED ORAL at 08:05

## 2023-10-06 RX ADMIN — CLONIDINE HYDROCHLORIDE 0.1 MG: 0.1 TABLET ORAL at 08:07

## 2023-10-06 RX ADMIN — LEVOTHYROXINE SODIUM 125 MCG: 125 TABLET ORAL at 06:28

## 2023-10-06 RX ADMIN — METOPROLOL TARTRATE 25 MG: 25 TABLET, FILM COATED ORAL at 08:06

## 2023-10-06 RX ADMIN — AMLODIPINE BESYLATE 5 MG: 5 TABLET ORAL at 08:08

## 2023-10-06 RX ADMIN — BUPROPION HYDROCHLORIDE 300 MG: 300 TABLET, EXTENDED RELEASE ORAL at 08:05

## 2023-10-06 RX ADMIN — MEMANTINE 10 MG: 10 TABLET ORAL at 08:08

## 2023-10-06 RX ADMIN — OXYBUTYNIN CHLORIDE 5 MG: 5 TABLET ORAL at 08:18

## 2023-10-06 RX ADMIN — PANTOPRAZOLE SODIUM 20 MG: 20 TABLET, DELAYED RELEASE ORAL at 06:28

## 2023-10-06 RX ADMIN — DIVALPROEX SODIUM 750 MG: 250 TABLET, DELAYED RELEASE ORAL at 08:05

## 2023-10-06 RX ADMIN — TOPIRAMATE 200 MG: 100 TABLET, FILM COATED ORAL at 08:06

## 2023-10-06 RX ADMIN — FUROSEMIDE 20 MG: 20 TABLET ORAL at 08:07

## 2023-10-06 NOTE — TREATMENT TEAM
10/06/23 0735   Team Meeting   Meeting Type Daily Rounds   Initial Conference Date 10/06/23   Team Members Present   Team Members Present Physician;Nurse;;   Physician Team Member Dr. Catarina Pack, Dr. Melburn Litten Team Member [id number] Management Team Member [address] [place] [address] Work Team Member Kelly Mckinley   Patient/Family Present   Patient Present No   Patient's Family Present No     Patient is d/c to home today at [phone]. The patient has remained in excellent behavioral control and is medication compliant. All aftercare arranged.

## 2023-10-06 NOTE — PLAN OF CARE
Problem: INFECTION - ADULT  Goal: Absence or prevention of progression during hospitalization  Description: INTERVENTIONS:  - Assess and monitor for signs and symptoms of infection  - Monitor lab/diagnostic results  - Monitor all insertion sites, i.e. indwelling lines, tubes, and drains  - Monitor endotracheal if appropriate and nasal secretions for changes in amount and color  - [facility] appropriate cooling/warming therapies per order  - Administer medications as ordered  - Instruct and encourage patient and family to use good hand hygiene technique  - Identify and instruct in appropriate isolation precautions for identified infection/condition  Outcome: Completed  Goal: Absence of fever/infection during neutropenic period  Description: INTERVENTIONS:  - Monitor WBC    Outcome: Completed     Problem: SAFETY ADULT  Goal: Patient will remain free of falls  Description: INTERVENTIONS:  - Educate patient/family on patient safety including physical limitations  - Instruct patient to call for assistance with activity   - Consult OT/PT to assist with strengthening/mobility   - Keep Call bell within reach  - Keep bed low and locked with side rails adjusted as appropriate  - Keep care items and personal belongings within reach  - Initiate and maintain comfort rounds  - Make Fall Risk Sign visible to staff  - Offer Toileting every  Hours, in advance of need  - Initiate/Maintain alarm  - Obtain necessary fall risk management equipment:   - Apply yellow socks and bracelet for high fall risk patients  - Consider moving patient to room near nurses station  Outcome: Completed  Goal: Maintain or return to baseline ADL function  Description: INTERVENTIONS:  -  Assess patient's ability to carry out ADLs; assess patient's baseline for ADL function and identify physical deficits which impact ability to perform ADLs (bathing, care of mouth/teeth, toileting, grooming, dressing, etc.)  - Assess/evaluate cause of self-care deficits   - Assess range of motion  - Assess patient's mobility; develop plan if impaired  - Assess patient's need for assistive devices and provide as appropriate  - Encourage maximum independence but intervene and supervise when necessary  - Involve family in performance of ADLs  - Assess for home care needs following discharge   - Consider OT consult to assist with ADL evaluation and planning for discharge  - Provide patient education as appropriate  Outcome: Completed  Goal: Maintains/Returns to pre admission functional level  Description: INTERVENTIONS:  - Perform BMAT or MOVE assessment daily.   - Set and communicate daily mobility goal to care team and patient/family/caregiver. - Collaborate with rehabilitation services on mobility goals if consulted  - Perform Range of Motion times a day. - Reposition patient every  hours. - Dangle patient  times a day  - Stand patient times a day  - Ambulate patient  times a day  - Out of bed to chair  times a day   - Out of bed for meals  times a day  - Out of bed for toileting  - Record patient progress and toleration of activity level   Outcome: Completed     Problem: Ineffective Coping  Goal: Cooperates with admission process  Description: Interventions:   - Complete admission process  Outcome: Completed  Goal: Identifies ineffective coping skills  Outcome: Completed  Goal: Identifies healthy coping skills  Outcome: Completed  Goal: Demonstrates healthy coping skills  Outcome: Completed  Goal: Participates in unit activities  Description: Interventions:  - Provide therapeutic environment   - Provide required programming   - Redirect inappropriate behaviors   Outcome: Completed  Goal: Understands least restrictive measures  Description: Interventions:  - Utilize least restrictive behavior  Outcome: Completed     Problem: Anxiety  Goal: Anxiety is at manageable level  Description: Interventions:  - Assess and monitor patient's anxiety level.    - Monitor for signs and symptoms (heart palpitations, chest pain, shortness of breath, headaches, nausea, feeling jumpy, restlessness, irritable, apprehensive). - Collaborate with interdisciplinary team and initiate plan and interventions as ordered.   - [place] patient to unit/surroundings  - Explain treatment plan  - Encourage participation in care  - Encourage verbalization of concerns/fears  - Identify coping mechanisms  - Assist in developing anxiety-reducing skills  - Administer/offer alternative therapies  - Limit or eliminate stimulants  Outcome: Completed     Problem: Alteration in Thoughts and Perception  Goal: Attend and participate in unit activities, including therapeutic, recreational, and educational groups  Description: Interventions:  -Encourage Visitation and family involvement in care  Outcome: Completed     Problem: DISCHARGE PLANNING - CARE MANAGEMENT  Goal: Discharge to post-acute care or home with appropriate resources  Description: INTERVENTIONS:  - Conduct assessment to determine patient/family and health care team treatment goals, and need for post-acute services based on payer coverage, community resources, and patient preferences, and barriers to discharge  - Address psychosocial, clinical, and financial barriers to discharge as identified in assessment in conjunction with the patient/family and health care team  - Arrange appropriate level of post-acute services according to patient’s   needs and preference and payer coverage in collaboration with the physician and health care team  - Communicate with and update the patient/family, physician, and health care team regarding progress on the discharge plan  - Arrange appropriate transportation to post-acute venues  Outcome: Completed

## 2023-10-06 NOTE — PLAN OF CARE
Problem: INFECTION - ADULT  Goal: Absence or prevention of progression during hospitalization  Description: INTERVENTIONS:  - Assess and monitor for signs and symptoms of infection  - Monitor lab/diagnostic results  - Monitor all insertion sites, i.e. indwelling lines, tubes, and drains  - Monitor endotracheal if appropriate and nasal secretions for changes in amount and color  - [place] appropriate cooling/warming therapies per order  - Administer medications as ordered  - Instruct and encourage patient and family to use good hand hygiene technique  - Identify and instruct in appropriate isolation precautions for identified infection/condition  Outcome: Progressing  Goal: Absence of fever/infection during neutropenic period  Description: INTERVENTIONS:  - Monitor WBC    Outcome: Progressing     Problem: SAFETY ADULT  Goal: Patient will remain free of falls  Description: INTERVENTIONS:  - Educate patient/family on patient safety including physical limitations  - Instruct patient to call for assistance with activity   - Consult OT/PT to assist with strengthening/mobility   - Keep Call bell within reach  - Keep bed low and locked with side rails adjusted as appropriate  - Keep care items and personal belongings within reach  - Initiate and maintain comfort rounds  - Make Fall Risk Sign visible to staff  - Offer Toileting every  Hours, in advance of need  - Initiate/Maintain alarm  - Obtain necessary fall risk management equipment:   - Apply yellow socks and bracelet for high fall risk patients  - Consider moving patient to room near nurses station  Outcome: Progressing  Goal: Maintain or return to baseline ADL function  Description: INTERVENTIONS:  -  Assess patient's ability to carry out ADLs; assess patient's baseline for ADL function and identify physical deficits which impact ability to perform ADLs (bathing, care of mouth/teeth, toileting, grooming, dressing, etc.)  - Assess/evaluate cause of self-care deficits   - Assess range of motion  - Assess patient's mobility; develop plan if impaired  - Assess patient's need for assistive devices and provide as appropriate  - Encourage maximum independence but intervene and supervise when necessary  - Involve family in performance of ADLs  - Assess for home care needs following discharge   - Consider OT consult to assist with ADL evaluation and planning for discharge  - Provide patient education as appropriate  Outcome: Progressing  Goal: Maintains/Returns to pre admission functional level  Description: INTERVENTIONS:  - Perform BMAT or MOVE assessment daily.   - Set and communicate daily mobility goal to care team and patient/family/caregiver. - Collaborate with rehabilitation services on mobility goals if consulted  - Perform Range of Motion  times a day. - Reposition patient every  hours. - Dangle patient  times a day  - Stand patient  times a day  - Ambulate patient  times a day  - Out of bed to chair  times a day   - Out of bed for meals  times a day  - Out of bed for toileting  - Record patient progress and toleration of activity level   Outcome: Progressing     Problem: Ineffective Coping  Goal: Cooperates with admission process  Description: Interventions:   - Complete admission process  Outcome: Progressing  Goal: Identifies ineffective coping skills  Outcome: Progressing  Goal: Identifies healthy coping skills  Outcome: Progressing  Goal: Demonstrates healthy coping skills  Outcome: Progressing  Goal: Participates in unit activities  Description: Interventions:  - Provide therapeutic environment   - Provide required programming   - Redirect inappropriate behaviors   Outcome: Progressing  Goal: Understands least restrictive measures  Description: Interventions:  - Utilize least restrictive behavior  Outcome: Progressing     Problem: Anxiety  Goal: Anxiety is at manageable level  Description: Interventions:  - Assess and monitor patient's anxiety level.    - Monitor for signs and symptoms (heart palpitations, chest pain, shortness of breath, headaches, nausea, feeling jumpy, restlessness, irritable, apprehensive). - Collaborate with interdisciplinary team and initiate plan and interventions as ordered.   - [facility] patient to unit/surroundings  - Explain treatment plan  - Encourage participation in care  - Encourage verbalization of concerns/fears  - Identify coping mechanisms  - Assist in developing anxiety-reducing skills  - Administer/offer alternative therapies  - Limit or eliminate stimulants  Outcome: Progressing     Problem: Alteration in Thoughts and Perception  Goal: Attend and participate in unit activities, including therapeutic, recreational, and educational groups  Description: Interventions:  -Encourage Visitation and family involvement in care  Outcome: Progressing     Problem: DISCHARGE PLANNING - CARE MANAGEMENT  Goal: Discharge to post-acute care or home with appropriate resources  Description: INTERVENTIONS:  - Conduct assessment to determine patient/family and health care team treatment goals, and need for post-acute services based on payer coverage, community resources, and patient preferences, and barriers to discharge  - Address psychosocial, clinical, and financial barriers to discharge as identified in assessment in conjunction with the patient/family and health care team  - Arrange appropriate level of post-acute services according to patient’s   needs and preference and payer coverage in collaboration with the physician and health care team  - Communicate with and update the patient/family, physician, and health care team regarding progress on the discharge plan  - Arrange appropriate transportation to post-acute venues  Outcome: Progressing

## 2023-10-06 NOTE — PLAN OF CARE
Problem: DISCHARGE PLANNING - CARE MANAGEMENT  Goal: Discharge to post-acute care or home with appropriate resources  Description: INTERVENTIONS:  - Conduct assessment to determine patient/family and health care team treatment goals, and need for post-acute services based on payer coverage, community resources, and patient preferences, and barriers to discharge  - Address psychosocial, clinical, and financial barriers to discharge as identified in assessment in conjunction with the patient/family and health care team  - Arrange appropriate level of post-acute services according to patient’s   needs and preference and payer coverage in collaboration with the physician and health care team  - Communicate with and update the patient/family, physician, and health care team regarding progress on the discharge plan  - Arrange appropriate transportation to post-acute venues  Outcome: Adequate for Discharge    The patient is cleared to d/c home today. The patient's father will be picking her up at 11h30. The patient is in agreement with her d/c plan. The patient has the following appts scheduled:    -Hematology: Friday 10/13/23 @ 1:00pm  -PCP: Wednesday 10/18/23 @ 10:00am  -Neurology: Thursday 10/19/23 @ 10:00am  -[facility] ACT: As scheduled    Additional resources put into the patient's AVS including the crisis number. The patient signed her IMM notice.

## 2023-10-06 NOTE — BH TRANSITION RECORD
Contact Information: If you have any questions, concerns, pended studies, tests and/or procedures, or emergencies regarding your inpatient behavioral health visit. Please contact [facility] [address] [facility] [phone] and ask to speak to a , nurse or physician. A contact is available 24 hours/ 7 days a week at this number. Summary of Procedures Performed During your Stay:  Below is a list of major procedures performed during your hospital stay and a summary of results:  - No major procedures performed. Pending Studies (From admission, onward)    None        Please follow up on the above pending studies with your PCP and/or referring provider.

## 2023-10-06 NOTE — NURSING NOTE
Patient is for discharge today. Belongings and valuables returned to the patient . AVS reviewed with the patient and she had no  further questions. Patient stated she has no depression or anxiety. Denies SI,HI.

## 2023-10-08 NOTE — DISCHARGE SUMMARY
Discharge Summary - [address] 47 y.o. female MRN: [id number]  Unit/Bed#: [facility] 826-59 Encounter: [id number]     Admission Date: 9/28/2023         Discharge Date: 10/06/2023 11:52 AM    Attending Psychiatrist: Laureen Carrasco DO    Reason for Admission/HPI:     Per HPI from admission H&P obtained by Dr. Chanel Cartagena on 09/29/2023:    "Anabell Ceja is a 47 y.o.  female, single (no children), working with people with disability (3 days/ week, 7h/day), domiciled alone with her cat w/ PMH of multiple medical conditions including obesity (BMI: 52), MAG (on CPAP), COPD, h/o PE,  HTN, hypothyroidism, Sjogren's syndrome, overactive bladder, GERD, chronic LBP, DDD, migraine, and PPH of Schizoaffective disorder, Borderline Personality Disorder, polysubstance abuse (methamphetamines [radha since 9/2/23 as per patient), THC, alcohol), multiple prior psychiatric admissions, multiple prior SA / gestures, h/o self-injurious behavior who was BIB EMS activated by the patient following SA via OD on 25 tablets of Flexaril 10 mg on 9/26/23. The patient was evaluated by psychiatry consult on 9/27/23. She signed 201 and got admitted to the inpatient psychiatry unit 6B for further psychiatric stabilization.       The pt was visited on the unit; chart reviewed. On approach, the patient was irritable, yelling in her room, ruminating on her clothes being lost, stated that she is not willing to talk and wanted to sign 72 h letter, making threatening gestures to the writer and tried to slam the door when the writer was entering the room. The patient was verbally redirectable and did not require IM meds at the time, later apologized the writer and became more pleasant and cooperative.      The patient reported feeling more depressed over a month in the context of multiple psychosocial stressors including medical issues, stressors at work and noncompliance with her medication for about 2 weeks.   She noted that she took a leave of absence around her mother's death anniversary on August 18 and then went back to work temporarily but had to take medical leave due to pulmonary emboli. She went back to work on Monday and worked for 2 days but reportedly felt very overwhelmed and stressed out at work. She also noted that she started the program for substance abuse at [facility] and only attended 1 group which was overwhelming to the patient. She stated that she was not able to handle the stressors, overwhelmed with her situation and when she was reported the online with [facility], she overdosed on Flexaril, and then she called 911 was brought into the hospital.  She admitted poor frustration tolerance getting angry and irritable easily and also reported poor attention and focus and as per patient her psychiatrist wanted to start her on Strattera for possible ADHD. She reported interrupted sleep despite using the CPAP machine waking up every 2 hours or so but sleeps for 6 to 7 hours on average. She denied any auditory hallucination lately but reported very visual hallucinations. She noted that she had a full-blown manic episode earlier in summer but was not able to remember the exact time. When the patient expressed distress over her clothes reportedly being lost in the Emergency Department, she was asked if she still harbored thoughts of wanting to die and why the loss of her clothes upset her. She responded, Clide Soja were my favorite clothes. I don’t want to die anymore.” Denied SI/HI, intent or plan upon direct inquiry at this time. The patient agreed to verbalize any suicidal thoughts, frustrations or concerns to the nursing staff, immediately.     She noted that loss of her mother in hospice care was traumatic to her and she has recurrent memories but denied any flashbacks or nightmares. She denied any history of eating disorder.   She reported having "OCD", stated that everything should be in certain way and if someone messed up with her desk she gets frustrated at work I would not be able to work until fix her desk. She also reported washing her hands multiple times especially while at work.      She smokes "medical marijuana". Has a h/o methamphetamine use disorder, reportedly clean since 9/2/23. Denied drinking alcohol (last use was a couple of years ago as per patient) or other illicit substance use.      The patient received Haldol Dec 50 mg IM on 9/27/23. Maintained on Wellbutrin  mg daily, Depakote 750 mg nightly and Topamax 200 mg BID. She was started on Naltrexone 25 mg daily for impulsivity. List of meds to be confirmed by the ACT team, and doses to be adjusted as indicated."       Social History     Tobacco History     Smoking Status  Former Smoking Start Date  1/1/1985 Quit Date  1/2/2018 Smoking Frequency  2.00 packs/day for 30.00 years (60.00 ttl pk-yrs)    Smoking Tobacco Type  Cigarettes from 1/1/1985 to 1/2/2018    Smokeless Tobacco Use  Never    Tobacco Comments  Started at age 14. Alcohol History     Alcohol Use Status  Not Currently Drinks/Week  0 Glasses of wine, 0 Cans of beer, 0 Shots of liquor, 0 Standard drinks or equivalent per week Comment  Recovering alcoholic          Drug Use     Drug Use Status  Yes Types  Marijuana, Methamphetamines Comment  Trying to get off meth          Sexual Activity     Sexually Active  Not Currently Partners  Male Comment  defer          Activities of Daily Living    Not Asked               Additional Substance Use Detail     Questions Responses    Problems Due to Past Use of Alcohol?  No    Substance Use Assessment Substance use within the past 12 months    Alcohol Use Frequency Denies use in past 12 months    Cannabis frequency Daily    Comment: 3 or more times/week on 9/12/2018 3 or more times/week -> Daily on 7/21/2022     Heroin Frequency Denies use in past 12 months    Alcohol Drink of Choice wine coolers    Cannabis method Smoke    Comment: Smoke on 9/12/2018     1st Use of Alcohol 16years old    Cannabis 3t Use 16years old    Last Use of Alcohol & Amount 3 years ago 1 wine cooler    Cannabis last use 7/13/22    Comment: Has a medical marijuana card     Longest Abstinence from Alcohol 3 years    Cannabis Longest Abstinence unclear    Cocaine frequency 3 or more times/week    Comment: Never used on 12/22/2021 Never used -> 3 or more times/week on 7/21/2022     Crack Cocaine Frequency Denies use in past 12 months    Methamphetamine Frequency Experimented    Methamphetamine Method Snort    Cocaine First Use 16years old    Methamphetamine 3t Use 37years old    Cocaine last use     Comment: quit in her 29's     Methamphetamine Last Use & Amount 7/13/22    Cocaine Longest Abstinence Over 10-20 years    Methamphetamine Longest Abstinence unclear    Narcotic Frequency Denies use in past 12 months    Benzodiazepine Frequency Denies use in past 12 months    Amphetamine frequency Denies use in past 12 months    Barbituate Frequency Denies use use in past 12 months    Inhalant frequency Never used    Comment: Never used on 7/6/2021     Hallucinogen frequency Never used    Comment: Never used on 7/6/2021     Ecstasy frequency Never used    Comment: Never used on 7/6/2021     Other drug frequency Never used    Comment: Never used on 12/22/2021     Opiate frequency Denies use in past 12 months    Not reviewed.           Past Medical History:   Diagnosis Date   • Anxiety    • Arthritis     oa cassandra knees   • Asthma     good control- no medications   • Yan's esophagus    • Bipolar affective disorder ([address])    • Cannabis abuse with unspecified 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[address])    • Chronic pain disorder     lumbar   • COPD (chronic obstructive pulmonary disease) (HCC)    • CPAP (continuous positive airway pressure) dependence    • Degenerative disc disease at L5-S1 level    • Deliberate self-cutting     9/15/22per pt has not done recently-- does see a therapist and psychiatrist regularly   • Depression 09/16/2008   • Disease of thyroid gland     hypo   • MARTINEZ (dyspnea on exertion)     per pt "has had this with exertion and not new"   • Drug overdose 10/28/2008    suicide attempt and again drug overdose 7/2022-- [facility] floor and than transferred to [facility]   • Dysphagia    • Dyspnea    • Edema     BLE   • Family history of blood clots    • GERD (gastroesophageal reflux disease)    • Headache(784.0)    • Headache, tension-type    • High blood pressure    • History of COVID-19 12/30/2020    Symptoms started on 12/30/2020 and then got worse. Today she is feeling a little bit better. She is a candidate for monoclonal antibody infusion and set for 1/6/21 @ 1pm at [facility]. 01/07/21 - telemedicine -    • Hyperlipidemia    • Hypertension    • Knee pain, bilateral     Especially right   • Medical marijuana use    • Memory loss    • Migraines    • Obesity    • Overactive bladder    • Sjogren's disease ([address])    • Sleep apnea    • Stress incontinence    • Suicidal ideations    • Teeth missing    • Tremor     per pt Tremors of Right Leg and both Arms   • Visual impairment    • Wears glasses      Past Surgical History:   Procedure Laterality Date   • BREAST CYST EXCISION Right 1989   • CARPAL TUNNEL RELEASE Left    • CHOLECYSTECTOMY  05/2003    Laparoscopic   • COLONOSCOPY      01/12/2009   • DILATION AND CURETTAGE OF UTERUS     • ELBOW SURGERY Left     x2.  No hardware   • ESOPHAGOGASTRODUODENOSCOPY     • FOOT SURGERY Left     Plantar fasciotomy   • HERNIA REPAIR     • HYSTERECTOMY      laporoscopic, partial   • KNEE ARTHROSCOPY Bilateral    • OOPHORECTOMY Left 10/2015   • WY GASTROCNEMIUS RECESSION Left 02/24/2021    Procedure: RECESSION GASTROC OPEN;  Surgeon: Jakub Montoya DPM;  Location: [address] [place] [address] OR;  Service: Podiatry   • WY RPR UMBILICAL HRNA 5 YRS/> REDUCIBLE N/A 04/24/2019    Procedure: REPAIR HERNIA UMBILICAL LAPAROSCOPIC W/ ROBOTICS;  Surgeon: Rishi Hugo MD;  Location: [facility] Main OR;  Service: General   • VA SPHINCTEROTOMY ANAL DIVISION SPHINCTER SPX N/A 08/31/2018    Procedure: EUA, LEFT PARTIAL INTERNAL SPHINCTEROTOMY;  Surgeon: Michelle Cardona MD;  Location: [address] [place] [address] MAIN OR;  Service: Colorectal   • VA TNOT ELBOW LATERAL/MEDIAL DEBRIDE OPEN TDN RPR Left 09/20/2022    Procedure: RELEASE EPICONDYLAR ELBOW MEDIAL;  Surgeon: Eladio Meléndez MD;  [facility];  Service: Orthopedics   • REDUCTION MAMMAPLASTY Bilateral 1999   • REPAIR LACERATION Left     left hand-5/18/2009   • REPLACEMENT TOTAL KNEE Right    • ROTATOR CUFF REPAIR Left    • TONSILECTOMY AND ADNOIDECTOMY     • US GUIDED MSK PROCEDURE  04/22/2021   • VASCULAR SURGERY     • VEIN LIGATION Bilateral    • WISDOM TOOTH EXTRACTION         Medications: All current active medications have been reviewed. Medications prior to admission:    Prior to Admission Medications   Prescriptions Last Dose Informant Patient Reported? Taking?    Cholecalciferol (Vitamin D3) 125 MCG (5000 UT) CAPS 9/28/2023 Self Yes Yes   Sig: Take by mouth in the morning   Diclofenac Sodium (VOLTAREN) 1 % Not Taking Self No No   Sig: Apply 2 g topically 3 (three) times a day as needed (pain) For pain to affected area   Patient not taking: Reported on 9/28/2023   Diclofenac Sodium (VOLTAREN) 1 % Not Taking Self No No   Sig: Apply 2 g topically 4 (four) times a day   Fesoterodine Fumarate ER (Toviaz) 4 MG TB24 9/28/2023 Self Yes Yes   Sig: Take 1 tablet by mouth daily   Lurasidone HCl 60 MG TABS  Self No No   Sig: Take 1 tablet (60 mg total) by mouth 2 (two) times a day TAKEN IN THE MORNING AND AT NIGHT-----Latuda   RABEprazole (ACIPHEX) 20 MG tablet 9/28/2023 Self Yes Yes   Sig: Take 20 mg by mouth 2 (two) times a day   albuterol (Ventolin HFA) 90 mcg/act inhaler 9/28/2023 Self No Yes   Sig: Inhale 2 puffs every 6 (six) hours as needed for wheezing   amLODIPine (NORVASC) 5 mg tablet 9/28/2023 Self No Yes   Sig: TAKE 1 TABLET (5 MG TOTAL) BY MOUTH DAILY   ammonium lactate (LAC-HYDRIN) 12 % cream Not Taking Self Yes No   Patient not taking: Reported on 9/28/2023   buPROPion (WELLBUTRIN XL) 150 mg 24 hr tablet  Self No No   Sig: Take 1 tablet (150 mg total) by mouth daily   Patient not taking: Reported on 9/22/2023   buPROPion (WELLBUTRIN XL) 300 mg 24 hr tablet 9/28/2023 Self Yes No   Sig: Take 300 mg by mouth daily   carBAMazepine (CARBATROL) 300 MG 12 hr capsule 9/28/2023 Self Yes Yes   Sig: Take 1 capsule by mouth in the morning   clobetasol (TEMOVATE) 0.05 % ointment Not Taking Self No No   Sig: Apply to affected areas sparingly. Do not exceed 2 weeks of treatment. Patient not taking: Reported on 9/28/2023   colestipol (COLESTID) 1 g tablet  Self No No   Sig: Take 1 tablet (1 g total) by mouth 2 (two) times a day   cyproheptadine (PERIACTIN) 4 mg tablet Not Taking Self Yes No   Patient not taking: Reported on 9/28/2023   divalproex sodium (DEPAKOTE) 250 mg EC tablet  Self No No   Sig: Take 3 tablets (750 mg total) by mouth every 12 (twelve) hours   ergocalciferol (ERGOCALCIFEROL) 1.25 MG (67368 UT) capsule Not Taking Self Yes No   Patient not taking: Reported on 9/28/2023   estradiol (ESTRACE) 0.5 MG tablet 9/28/2023 Self No Yes   Sig: Take 1 tablet (0.5 mg total) by mouth daily   fluticasone (FLOVENT HFA) 110 MCG/ACT inhaler Not Taking Self Yes No   Sig: Inhale 2 puffs as needed Rinse mouth after use.    Patient not taking: Reported on 9/28/2023   haloperidol decanoate (Haldol Decanoate) 50 mg/mL injection 9/27/2023 Self Yes Yes   Sig: every 30 (thirty) days   hydrOXYzine HCL (ATARAX) 50 mg tablet Past Week Self No Yes   Sig: Take 1 tablet (50 mg total) by mouth daily at bedtime   hydrocortisone 2.5 % ointment Past Month Self No Yes   Sig: Apply topically 2 (two) times a day   levothyroxine (Euthyrox) 125 mcg tablet 9/28/2023 Self No No   Sig: Take 1 tablet (125 mcg total) by mouth daily in the early morning   losartan (COZAAR) 50 mg tablet 9/28/2023 Self No Yes   Sig: TAKE ONE TABLET BY MOUTH DAILY   memantine (NAMENDA) 10 mg tablet 9/28/2023 Self Yes Yes   metoprolol tartrate (LOPRESSOR) 25 mg tablet 9/28/2023 Self No Yes   Sig: TAKE ONE TABLET BY MOUTH EVERY 12 HOURS   ondansetron (ZOFRAN-ODT) 4 mg disintegrating tablet Not Taking Self No No   Sig: Take 1 tablet (4 mg total) by mouth every 6 (six) hours as needed for nausea or vomiting   Patient not taking: Reported on 9/28/2023   pilocarpine (SALAGEN) 5 mg tablet 9/28/2023 Self Yes Yes   Sig: Take 5 mg by mouth 2 (two) times a day   rOPINIRole (REQUIP) 1 mg tablet 9/28/2023 Self Yes Yes   topiramate (TOPAMAX) 100 mg tablet  Self No No   Sig: Take 1.5 tablets (150 mg total) by mouth 2 (two) times a day   Patient taking differently: Take 200 mg by mouth 2 (two) times a day   topiramate (TOPAMAX) 200 MG tablet   Yes No   trospium chloride (SANCTURA) 20 mg tablet 9/28/2023 Self Yes Yes   Sig: Take 20 mg by mouth 2 (two) times a day   venlafaxine (EFFEXOR) 37.5 mg tablet 9/28/2023 Self Yes Yes   Sig: Take 37.5 mg by mouth daily   warfarin (COUMADIN) 6 mg tablet 9/28/2023 Self No Yes   Sig: Take 6mg (one tablet) daily every day by mouth. Facility-Administered Medications: None       Allergies: Allergies   Allergen Reactions   • Percocet [Oxycodone-Acetaminophen] Headache     Severe headaches   (denies issues with Tylenol)   • Povidone Iodine Rash     Unsure if betadine or gauze post surgical. Got rash on leg. Has  Had itchy rashes after every surgery prep and IV insertion   • Medical Tape Hives   • Chlorhexidine Rash     Per pt "able to use the liquid soap--thinkd reaction from the sponges or wipes"       Objective     Vital signs in last 24 hours:         No intake or output data in the 24 hours ending 10/08/23 [address]:     Raman Baires was admitted to the inpatient psychiatric unit and started on [facility] checks every 7 minutes.  During the hospitalization she was attending individual therapy, group therapy, milieu therapy and occupational therapy. [facility] medications were adjusted over the hospital stay. To address depressive symptoms, mood instability, irritability, self-abusive behavior, flashbacks, nightmares and insomnia, Gayle Sorto was treated with antidepressant Wellbutrin XL, mood stabilizer Depakote ER and Topamax, antipsychotic medication Latuda and Haldol Decanoate, anxiolytic medication Clonidine and medication to control impulsivity Naltrexone. Medication doses were adjusted during the hospital course. Wellbutrin XL was continued at 300mg PO daily. Depakote ER was adjusted to 750mg PO daily and 1000mg PO at HS (last VPA level 71 on 10/4/2023). Topamax was continued at 200mg PO BID. Ana Adair was adjusted to 40mg BID (with breakfast and dinner). Haldol Decanoate was continued at 50mg IM every 30 days, she last received prior to admission on 9/27/2023. She will continue with this after discharge through her ACT team. Naltrexone was added and titrated to 50mg PO daily. Clonidine was added and adjusted to 0.1mg PO every 12 hours. Prior to beginning of treatment medications risks and benefits and possible side effects including risk of liver impairment related to treatment with Depakote, risk of parkinsonian symptoms, Tardive Dyskinesia and metabolic syndrome related to treatment with antipsychotic medications, risk of cardiovascular events in elderly related to treatment with antipsychotic medications and risk of impaired next-day mental alertness, complex sleep-related behavior and dependence related to treatment with hypnotic medications were reviewed with Gayle Sorto. She verbalized understanding and agreement for treatment. Upon admission Gayle Sorto was seen by medical service for medical clearance for inpatient treatment and medical follow up. Luiss symptoms slowly improved over the hospital course.  Initially after admission she was still feeling depressed, anxious, frustrated, overwhelmed, labile and irritable. With adjustment of medications and therapeutic milieu her symptoms slowly improved. At the end of treatment Zechariah Huerta was more stable. Her mood was more stable at the time of discharge. Zechariah Huerta denied suicidal ideation, intent or plan at the time of discharge and denied homicidal ideation, intent or plan at the time of discharge. There was no overt psychosis at the time of discharge. Zechariah Huerta was participating appropriately in milieu at the time of discharge. Behavior was appropriate on the unit at the time of discharge. Sleep and appetite were improved. Since Zechariah Huerta was doing well at the end of the hospitalization, treatment team felt that she could be safely discharged to outpatient care.       Mental Status at Time of Discharge:     Appearance:  casually dressed, dressed appropriately, adequate grooming   Behavior:  pleasant, cooperative, calm   Speech:  normal rate and volume   Mood:  "Good"   Affect:  appropriate   Thought Process:  goal directed   Associations: intact associations   Thought Content:  No overt delusions or paranoia were verbalized to this writer   Perceptual Disturbances: denies auditory or visual hallucinations when asked, does not appear responding to internal stimuli   Risk Potential: Suicidal ideation - None  Homicidal ideation - None  Potential for aggression - No   Sensorium:  oriented to person, place and time/date   Memory:  recent and remote memory grossly intact   Consciousness:  alert and awake   Attention/Concentration: attention span and concentration are age appropriate   Insight:  improved   Judgment: improved   Gait/Station: normal gait/station   Motor Activity: no abnormal movements       Admission Diagnosis:    Principal Problem:    Schizoaffective disorder, bipolar type ([address])  Active Problems:    Yan's esophagus determined by biopsy    Benign essential hypertension    Hypothyroidism    MAG (obstructive sleep apnea)    Overactive bladder    Sjogren's syndrome (HCC)    COPD (chronic obstructive pulmonary disease) ([facility])    Mixed hyperlipidemia    Medical clearance for psychiatric admission    Chronic tension-type headache, intractable    Contusion of elbow    Borderline personality disorder ([address])    History of pulmonary embolism    Contact dermatitis and eczema    Polysubstance abuse ([address])    Morbid obesity with BMI of 45.0-49.9, adult (HCC)    Restless leg syndrome      Discharge Diagnosis:     Principal Problem:    Schizoaffective disorder, bipolar type ([address])  Active Problems:    Yan's esophagus determined by biopsy    Benign essential hypertension    Hypothyroidism    MAG (obstructive sleep apnea)    Overactive bladder    Sjogren's syndrome (HCC)    COPD (chronic obstructive pulmonary disease) ([facility])    Mixed hyperlipidemia    Medical clearance for psychiatric admission    Chronic tension-type headache, intractable    Contusion of elbow    Borderline personality disorder ([address])    History of pulmonary embolism    Contact dermatitis and eczema    Polysubstance abuse ([address])    Morbid obesity with BMI of 45.0-49.9, adult ([facility])    Restless leg syndrome  Resolved Problems:    * No resolved hospital problems. *      Lab Results:   I have personally reviewed all pertinent laboratory/tests results.   Most Recent Labs:   Lab Results   Component Value Date    WBC 8.72 09/29/2023    RBC 4.40 09/29/2023    HGB 13.2 09/29/2023    HCT 41.8 09/29/2023     09/29/2023    RDW 13.9 09/29/2023    TOTANEUTABS 7.51 09/20/2023    NEUTROABS 5.46 09/29/2023    SODIUM 138 10/05/2023    K 4.0 10/05/2023     10/05/2023    CO2 23 10/05/2023    BUN 16 10/05/2023    CREATININE 0.58 (L) 10/05/2023    GLUC 94 10/05/2023    GLUF 94 10/05/2023    CALCIUM 8.5 10/05/2023    AST 11 (L) 10/05/2023    ALT 12 10/05/2023    ALKPHOS 42 10/05/2023    TP 6.1 (L) 10/05/2023    ALB 3.6 10/05/2023    TBILI 0.24 10/05/2023    CHOLESTEROL 234 (H) 03/09/2023    HDL 71 03/09/2023    TRIG 153 (H) 03/09/2023    LDLCALC 132 (H) 03/09/2023    [address] 163 03/09/2023    VALPROICTOT 71 10/05/2023    AMMONIA 32 03/09/2023    YHB5RWFNNSYO 7.002 (H) 09/29/2023    FREET4 0.84 09/29/2023    T3FREE 2.27 (L) 10/16/2019    PREGUR negative 07/13/2022    PREGSERUM Negative 09/06/2023    RPR Non-Reactive 07/17/2022    HGBA1C 5.0 01/05/2023    EAG 97 01/05/2023       Discharge Medications:    See after visit summary for all reconciled discharge medications provided to patient and family. Discharge instructions/Information to patient and family:     See after visit summary for information provided to patient and family. Provisions for Follow-Up Care:    See after visit summary for information related to follow-up care and any pertinent home health orders. Discharge Statement:    I spent 35 minutes discharging the patient. This time was spent on the day of discharge. I had direct contact with the patient on the day of discharge. Additional documentation is required if more than 30 minutes were spent on discharge:    I reviewed with Poppy Bestshaw importance of compliance with medications and outpatient treatment after discharge. I discussed the medication regimen and possible side effects of the medications with Poppy Ba prior to discharge. At the time of discharge she was tolerating psychiatric medications. I discussed outpatient follow up with Poppy Ba. I reviewed with Poppy Ba crisis plan and safety plan upon discharge. I discussed with Poppy Jesenia recommendation to follow up with outpatient drug and alcohol counseling and AA meetings. Poppy Ba agreed to abstain from drug and alcohol use after discharge. Poppy Ba was competent to understand risks and benefits of withholding information and risks and benefits of her actions.     Discharge on Two Antipsychotic Medications: Yes - Poppy Ba is being discharged on 2 antipsychotic agents (Latuda and Haldol Decanoate) due to the history of at least 3 antipsychotic medication trials and failure of multiple trials of monotherapy.     Zenon Ballard, DO 10/08/23

## 2023-10-13 ENCOUNTER — APPOINTMENT (OUTPATIENT)
Dept: LAB | Facility: CLINIC | Age: 53
End: 2023-10-13
Payer: MEDICARE

## 2023-10-13 ENCOUNTER — OFFICE VISIT (OUTPATIENT)
Dept: HEMATOLOGY ONCOLOGY | Facility: CLINIC | Age: 53
End: 2023-10-13
Payer: MEDICARE

## 2023-10-13 VITALS
OXYGEN SATURATION: 95 % | SYSTOLIC BLOOD PRESSURE: 132 MMHG | DIASTOLIC BLOOD PRESSURE: 80 MMHG | WEIGHT: 293 LBS | HEART RATE: 69 BPM | TEMPERATURE: 97.8 F | BODY MASS INDEX: 50.68 KG/M2

## 2023-10-13 DIAGNOSIS — I26.92 ACUTE SADDLE PULMONARY EMBOLISM, UNSPECIFIED WHETHER ACUTE COR PULMONALE PRESENT (HCC): ICD-10-CM

## 2023-10-13 DIAGNOSIS — I82.4Z2 ACUTE DEEP VEIN THROMBOSIS OF LOWER LEG, LEFT (HCC): ICD-10-CM

## 2023-10-13 DIAGNOSIS — I26.92 ACUTE SADDLE PULMONARY EMBOLISM, UNSPECIFIED WHETHER ACUTE COR PULMONALE PRESENT (HCC): Primary | ICD-10-CM

## 2023-10-13 LAB
INR PPP: 3.09 (ref 0.84–1.19)
PROTHROMBIN TIME: 31.2 SECONDS (ref 11.6–14.5)

## 2023-10-13 PROCEDURE — 85610 PROTHROMBIN TIME: CPT

## 2023-10-13 PROCEDURE — 99205 OFFICE O/P NEW HI 60 MIN: CPT

## 2023-10-13 PROCEDURE — 36415 COLL VENOUS BLD VENIPUNCTURE: CPT

## 2023-10-13 RX ORDER — ATOMOXETINE 40 MG/1
CAPSULE ORAL
COMMUNITY
Start: 2023-10-13

## 2023-10-13 RX ORDER — ROPINIROLE 1 MG/1
1 TABLET, FILM COATED ORAL
COMMUNITY
Start: 2023-10-13

## 2023-10-13 NOTE — LETTER
October 13, 2023     Patient: Jermaine Merino  YOB: 1970  Date of Visit: 10/13/2023      To Whom it May Concern:    Essie Balderas is under my professional care. Jameson Chinese was seen in my office on 10/13/2023. Please excuse patient from work half day today. If you have any questions or concerns, please don't hesitate to call.          Sincerely,              JHONATAN Gandhi        CC: No Recipients

## 2023-10-13 NOTE — PROGRESS NOTES
Oncology Outpatient Consult Note  Zayra Corrales 47 y.o. female MRN: @ Encounter: [id number]        Date:  10/13/2023        CC: Saddle pulmonary embolism and left lower extremity DVT      HPI:  Zayra Corrales is seen for initial consultation 10/13/2023 regarding saddle pulmonary embolism and left lower extremity DVT. Patient has a significant PMH of suicide attempts, most recent in September 2023 by taking 25 pills of Flexeril, she signed a 201. Patient also has a long history of cutting herself, major depressive disorder, hypothyroidism, varus esophagus, GERD, COPD, HTN, chronic migraines, osteoarthritis, tremors schizoaffective disorder, bipolar type. Patient also has a long history of drug abuse, reports she has been smoking methamphetamine for a "long time, a couple lines a day."  She reports she has been clean for the past month. Rola Henson is also in counseling 2 times a week. She restarted her employment hospitalization with [employer] this week. Patient has a history of a hysterectomy "years ago."  Patient has a 10-year history of smoking cigarettes (2 packs a day), quit 5 years ago. She smokes medical marijuana, reports mainly at night to sleep. 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 She used to drink 6 pack of beer a day for 8 years. Has a history of right knee replacement surgery in February. Patient in the office using a cane. Labs:  9/29/2023: Hgb 13.2, MCV 5, WBC 8.72, platelets 350,629, vitamin B12 510, folate 19.2. Recent hospitalizations:    On 9/1/23, patient presented to the ED for increased shortness of breath and chest discomfort that she has never felt before. Patient reports she snorted 2 lines of methamphetamine that day due to some stressors. CT chest PE study showed a large saddle embolus extending into the right and left lower lobar and bilateral lower lobe segmental pulmonary arterial branches with evidence of right heart strain.   Subtle lobulated contour of the liver may suggest component of hepatic cirrhosis. On 9/2/23, venous duplex revealed  a nonocclusive DVT in the left lower extremity in the distal femoral, popliteal and 1 peroneal veins. On 9/6/2023, patient return to the ED for increased shortness of breath, no relief with BiPAP. Patient was placed on a heparin drip. On 9 7 patient's PTT was greater than 210 and heparin GTT was placed on hold. Per pharmacist, there is a major drug drug interaction between Eliquis and patient's Tegretol XR which leads to decreased concentration of Eliquis. Patient was then started on therapeutic Lovenox. He was then bridged to Coumadin and was discharged on 9/12/2023.  9/7/23: CT chest PE study: Persistent saddle pulmonary embolism extending into the bilateral lower lobar to segmental branches, clot burden is not significantly changed since prior exam     On 9/15/23, patient returned to the ED for difficulty breathing, she was discharged the same day with albuterol as needed and recommended BiPAP nightly. On 9/20/2023, patient returned to the ED for ongoing shortness of breath, chest pain and left lower extremity pain. CT chest PE study:    Interval resolution of previously seen saddle embolus and right-sided emboli. There is decreased thrombus in the distal left main pulmonary artery extending partially into left upper and left lower lobe segmental arteries. No new emboli appreciated. On 9/26/23, patient presented to the ED with intentional overdose by taking 2510 mg pills of Flexeril. She had reported that she was a recovering meth user and been clean for 26 days at that time. Patient was admitted to behavioral health unit was discharged 10/6/2023. Patient's last INR was checked at the behavioral health unit, INR of 2.66. She reports that her PCPs office does not manage Coumadin. She is currently taking Coumadin 4 mg daily and reports compliance. Reports she has not missed a dose.     Patient reports she does have her father and brother who she is close with and finds them to be supportive. Family history: Mother-history of DVTs  Grandmother-history of DVTs    ROS: As stated in history of present illness otherwise her 14 point review of systems today was negative. ECOG PS: 2    Test Results:    Imaging: XR chest 1 view portable    Result Date: 9/27/2023  Narrative: CHEST INDICATION:   sob. COMPARISON: 9/20/2023 CT EXAM PERFORMED/VIEWS:  XR CHEST PORTABLE Single view FINDINGS: Cardiomediastinal silhouette appears unremarkable. Persistent subsegmental atelectasis left lung base No pneumothorax or pleural effusion. Osseous structures appear within normal limits for patient age. Impression: Persistent subsegmental atelectasis left lung base Workstation performed: [id number]     CTA ED chest PE Study    Result Date: 9/21/2023  Narrative: CTA - CHEST WITH IV CONTRAST - PULMONARY ANGIOGRAM INDICATION:   Pulmonary embolism (PE) suspected, high prob Known PE with SOB, c/p. COMPARISON: CT 9/7/2023 and 9/1/2023. TECHNIQUE: CTA examination of the chest was performed using angiographic technique according to a protocol specifically tailored to evaluate for pulmonary embolism. Multiplanar 2D reformatted images were created from the source data. In addition, coronal 3D MIP postprocessing was performed on the acquisition scanner. Radiation dose length product (DLP) for this visit:  694 mGy-cm . This examination, like all CT scans performed in the [facility], was performed utilizing techniques to minimize radiation dose exposure, including the use of iterative reconstruction and automated exposure control. IV Contrast:  85 mL of iohexol (OMNIPAQUE) FINDINGS: PULMONARY ARTERIAL TREE: Evaluation is limited by suboptimal bolus timing. Interval resolution of previously seen saddle embolus and right-sided emboli.  There is decreased thrombus in the distal left main pulmonary artery extending partially into left upper and left lower lobe segmental arteries. No new emboli appreciated. LUNGS: Mild bibasilar subsegmental atelectasis. There is no tracheal or endobronchial lesion. PLEURA:  Unremarkable. HEART/GREAT VESSELS:  Unremarkable for patient's age. No thoracic aortic aneurysm. MEDIASTINUM AND RUEL:  Unremarkable. CHEST WALL AND LOWER NECK:   Unremarkable. VISUALIZED STRUCTURES IN THE UPPER ABDOMEN:  Unremarkable. OSSEOUS STRUCTURES:  No acute fracture or destructive osseous lesion. Impression: Study limited by suboptimal contrast bolus timing. No evidence of acute pulmonary embolus. Interval improvement of previously seen extensive embolic burden, with residual though decreased left-sided emboli. Finding (s) were preliminarily reported by [facility] service at the time of examination. Workstation performed: [id number]     XR chest 2 views    Result Date: 9/15/2023  Narrative: CHEST INDICATION:   Chest Pain. COMPARISON: 09/06/2023 EXAM PERFORMED/VIEWS:  XR CHEST PA & LATERAL The frontal view was performed utilizing dual energy radiographic technique. Images: 4 FINDINGS: Cardiomediastinal silhouette appears unremarkable. The lungs are clear. No pneumothorax or pleural effusion. Osseous structures appear within normal limits for patient age. Impression: No acute cardiopulmonary disease.  Workstation performed: [id number]       Labs:   Lab Results   Component Value Date    WBC 8.72 09/29/2023    HGB 13.2 09/29/2023    HCT 41.8 09/29/2023    MCV 95 09/29/2023     09/29/2023     Lab Results   Component Value Date    K 4.0 10/05/2023     10/05/2023    CO2 23 10/05/2023    BUN 16 10/05/2023    CREATININE 0.58 (L) 10/05/2023    GLUF 94 10/05/2023    CALCIUM 8.5 10/05/2023    CORRECTEDCA 8.9 09/12/2023    AST 11 (L) 10/05/2023    ALT 12 10/05/2023    ALKPHOS 42 10/05/2023    EGFR 107 10/05/2023       Lab Results   Component Value Date    [id number] 510 09/29/2023       Active Problems:   Patient Active Problem List   Diagnosis    Yan's esophagus determined by biopsy    Benign essential hypertension    Schizoaffective disorder, bipolar type ([facility])    Chronic low back pain    DDD (degenerative disc disease), lumbar    Edema    Family history of renal cancer    Hypothyroidism    Microhematuria    MAG (obstructive sleep apnea)    Spondylosis of lumbosacral joint without myelopathy    Hypertension    Overactive bladder    Primary osteoarthritis of both knees    Urinary incontinence    Major depressive disorder    Sjogren's syndrome ([facility])    COPD (chronic obstructive pulmonary disease) ([facility])    Mixed hyperlipidemia    BMI 45.0-49.9, adult ([address])    Memory difficulties    History of COVID-19    Morbid obesity with BMI of 40.0-44.9, adult ([facility])    Medical clearance for psychiatric admission    Hemorrhage of rectum and anus    Medial epicondylitis of elbow, left    Restrictive lung disease    Chronic tension-type headache, intractable    Potential for cognitive impairment    Mood disorder ([facility])    Substance abuse ([facility])    Cognitive impairment    Contusion of elbow    GERD (gastroesophageal reflux disease)    Diarrhea    Ecchymosis    Anxiety    Borderline personality disorder ([facility])    Platelets decreased ([facility])    Knee pain, right    Suicidal deliberate poisoning ([address])    Intentional self-harm by unspecified sharp object, sequela ([address])    Chronic eczematous otitis externa of both ears    Chronic migraine without aura without status migrainosus, not intractable    Bilateral leg edema    Medial epicondylitis of right elbow    Cervicalgia    History of pulmonary embolism    Elevated troponin    Acute respiratory failure ([facility])    Contact dermatitis and eczema    Tremors of nervous system    Intentional overdose ([address])    Polysubstance abuse ([address])    Morbid obesity with BMI of 45.0-49.9, adult ([address])    Restless leg syndrome       Past Medical History:   Past Medical History:   Diagnosis Date Anxiety     Arthritis     oa cassandra knees    Asthma     good control- no medications    Yan's esophagus     Bipolar affective disorder (HCC)     Cannabis abuse with unspecified 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HCC)     Chronic pain disorder     lumbar    COPD (chronic obstructive pulmonary disease) (HCC)     CPAP (continuous positive airway pressure) dependence     Degenerative disc disease at L5-S1 level     Deliberate self-cutting     9/15/22per pt has not done recently-- does see a therapist and psychiatrist regularly    Depression 09/16/2008    Disease of thyroid gland     hypo    MARTINEZ (dyspnea on exertion)     per pt "has had this with exertion and not new"    Drug overdose 10/28/2008    suicide attempt and again drug overdose 7/2022-- [facility] floor and than transferred to [facility]    Dysphagia     Dyspnea     Edema     BLE    Family history of blood clots     GERD (gastroesophageal reflux disease)     Headache(784.0)     Headache, tension-type     High blood pressure     History of COVID-19 12/30/2020    Symptoms started on 12/30/2020 and then got worse. Today she is feeling a little bit better. She is a candidate for monoclonal antibody infusion and set for 1/6/21 @ 1pm at [facility]. 01/07/21 - telemedicine -     Hyperlipidemia     Hypertension     Knee pain, bilateral     Especially right    Medical marijuana use     Memory loss     Migraines     Obesity     Overactive bladder     Sjogren's disease ([address])     Sleep apnea     Stress incontinence     Suicidal ideations     Teeth missing     Tremor     per pt Tremors of Right Leg and both Arms    Visual impairment     Wears glasses        Surgical History:   Past Surgical History:   Procedure Laterality Date    BREAST CYST EXCISION Right 1989    CARPAL TUNNEL RELEASE Left     CHOLECYSTECTOMY  05/2003    Laparoscopic    COLONOSCOPY      01/12/2009    DILATION AND CURETTAGE OF UTERUS      ELBOW SURGERY Left     x2.  No hardware ESOPHAGOGASTRODUODENOSCOPY      FOOT SURGERY Left     Plantar fasciotomy    HERNIA REPAIR      HYSTERECTOMY      laporoscopic, partial    KNEE ARTHROSCOPY Bilateral     OOPHORECTOMY Left 10/2015    TX GASTROCNEMIUS RECESSION Left 02/24/2021    Procedure: RECESSION GASTROC OPEN;  Surgeon: Lieutenant Noy DPM;  Location: [address] [place] [address] MAIN OR;  Service: Podiatry    TX RPR UMBILICAL HRNA 5 YRS/> REDUCIBLE N/A 04/24/2019    Procedure: REPAIR HERNIA UMBILICAL LAPAROSCOPIC W/ ROBOTICS;  Surgeon: Arliss Curling, MD;  Location: [facility] Main OR;  Service: General    TX SPHINCTEROTOMY ANAL DIVISION SPHINCTER [facility] N/A 08/31/2018    Procedure: EUA, LEFT PARTIAL INTERNAL SPHINCTEROTOMY;  Surgeon: Pablito Leos MD;  Location: [address] [place] [address] MAIN OR;  Service: Colorectal    TX TNOT ELBOW LATERAL/MEDIAL DEBRIDE OPEN TDN RPR Left 09/20/2022    Procedure: RELEASE EPICONDYLAR ELBOW MEDIAL;  Surgeon: Duran Osborn MD;  Location: [facility] MAIN OR;  Service: Orthopedics    REDUCTION MAMMAPLASTY Bilateral 1999    REPAIR LACERATION Left     left hand-5/18/2009    REPLACEMENT TOTAL KNEE Right     ROTATOR CUFF REPAIR Left     TONSILECTOMY AND ADNOIDECTOMY      US GUIDED MSK PROCEDURE  04/22/2021    VASCULAR SURGERY      VEIN LIGATION Bilateral     WISDOM TOOTH EXTRACTION         Family History:    Family History   Problem Relation Age of Onset    Kidney cancer Mother     Diabetes Mother     Depression Mother     Stroke Mother     Arthritis Mother     Cancer Mother     Psychiatric Illness Mother     No Known Problems Father     No Known Problems Sister     No Known Problems Maternal Grandmother     No Known Problems Maternal Grandfather     No Known Problems Paternal Grandmother     No Known Problems Paternal Grandfather     Colon cancer Maternal Uncle     Colon cancer Maternal Uncle     Colon cancer Paternal Uncle     Colon cancer Family     Alcohol abuse Sister     Asthma Sister        Cancer-related family history includes Cancer in her mother; Colon cancer in her family, maternal uncle, maternal uncle, and paternal uncle; Kidney cancer in her mother. Social History:   Social History     Socioeconomic History    Marital status: Single     Spouse name: Not on file    Number of children: Not on file    Years of education: Not on file    Highest education level: Not on file   Occupational History    Not on file   Tobacco Use    Smoking status: Former     Packs/day: 2.00     Years: 30.00     Total pack years: 60.00     Types: Cigarettes     Start date: 1985     Quit date: 2018     Years since quittin.7    Smokeless tobacco: Never    Tobacco comments:     Started at age 14. Vaping Use    Vaping Use: Some days    Substances: THC   Substance and Sexual Activity    Alcohol use: Not Currently     Comment: Recovering alcoholic    Drug use: Yes     Types: Marijuana, Methamphetamines     Comment: Trying to get off meth    Sexual activity: Not Currently     Partners: Male     Comment: defer   Other Topics Concern    Not on file   Social History Narrative    Not on file     Social Determinants of Health     Financial Resource Strain: High Risk (2022)    Overall Financial Resource Strain (CARDIA)     Difficulty of Paying Living Expenses: Hard   Food Insecurity: No Food Insecurity (2023)    Hunger Vital Sign     Worried About Running Out of Food in the Last Year: Never true     Ran Out of Food in the Last Year: Never true   Transportation Needs: No Transportation Needs (2023)    PRAPARE - Transportation     Lack of Transportation (Medical): No     Lack of Transportation (Non-Medical):  No   Physical Activity: Not on file   Stress: Not on file   Social Connections: Not on file   Intimate Partner Violence: Not on file   Housing Stability: Unknown (2023)    Housing Stability Vital Sign     Unable to Pay for Housing in the Last Year: No     Number of Places Lived in the Last Year: Not on file     Unstable Housing in the Last Year: No       Current Medications: Current Outpatient Medications   Medication Sig Dispense Refill    albuterol (Ventolin HFA) 90 mcg/act inhaler Inhale 2 puffs every 6 (six) hours as needed for wheezing 18 g 5    amLODIPine (NORVASC) 5 mg tablet TAKE 1 TABLET (5 MG TOTAL) BY MOUTH DAILY 90 tablet 3    buPROPion (WELLBUTRIN XL) 300 mg 24 hr tablet Take 1 tablet (300 mg total) by mouth daily 30 tablet 0    cholecalciferol (VITAMIN D3) 1,000 units tablet Take 1 tablet (1,000 Units total) by mouth daily 30 tablet 0    cloNIDine (CATAPRES) 0.1 mg tablet Take 1 tablet (0.1 mg total) by mouth every 12 (twelve) hours 60 tablet 0    Diclofenac Sodium (VOLTAREN) 1 % Apply 2 g topically 4 (four) times a day 150 g 0    divalproex sodium (DEPAKOTE) 250 mg DR tablet Take 3 tablets (750 mg total) by mouth in the morning 90 tablet 0    divalproex sodium (DEPAKOTE) 500 mg DR tablet Take 2 tablets (1,000 mg total) by mouth daily at bedtime 60 tablet 0    estradiol (ESTRACE) 0.5 MG tablet Take 1 tablet (0.5 mg total) by mouth daily 30 tablet 4    furosemide (LASIX) 20 mg tablet Take 1 tablet (20 mg total) by mouth daily 30 tablet 3    haloperidol decanoate (Haldol Decanoate) 50 mg/mL injection every 30 (thirty) days      hydrocortisone 2.5 % ointment Apply topically 2 (two) times a day 30 g 0    levothyroxine (Euthyrox) 125 mcg tablet Take 1 tablet (125 mcg total) by mouth daily in the early morning 30 tablet 0    lidocaine (LIDODERM) 5 % Apply 1 patch topically over 12 hours daily Remove & Discard patch within 12 hours or as directed by MD  0    losartan (COZAAR) 50 mg tablet TAKE ONE TABLET BY MOUTH DAILY 30 tablet 9    lurasidone (LATUDA) 40 mg tablet Take 1 tablet (40 mg total) by mouth daily with breakfast 30 tablet 0    lurasidone (LATUDA) 40 mg tablet Take 1 tablet (40 mg total) by mouth daily with dinner 30 tablet 0    memantine (NAMENDA) 10 mg tablet Take 1 tablet (10 mg total) by mouth daily 30 tablet 0    metoprolol tartrate (LOPRESSOR) 25 mg tablet TAKE ONE TABLET BY MOUTH EVERY 12 HOURS 60 tablet 1    naltrexone (REVIA) 50 mg tablet Take 1 tablet (50 mg total) by mouth daily 30 tablet 0    oxybutynin (DITROPAN) 5 mg tablet Take 1 tablet (5 mg total) by mouth 2 (two) times a day 60 tablet 0    pantoprazole (PROTONIX) 20 mg tablet Take 1 tablet (20 mg total) by mouth daily in the early morning 30 tablet 0    pilocarpine (SALAGEN) 5 mg tablet Take 5 mg by mouth 2 (two) times a day      RABEprazole (ACIPHEX) 20 MG tablet Take 20 mg by mouth 2 (two) times a day      rOPINIRole (REQUIP) 1 mg tablet 1 mg      topiramate (TOPAMAX) 200 MG tablet Take 1 tablet (200 mg total) by mouth 2 (two) times a day 60 tablet 0    trospium chloride (SANCTURA) 20 mg tablet Take 20 mg by mouth 2 (two) times a day      warfarin (COUMADIN) 4 mg tablet Take 1 tablet (4 mg total) by mouth daily 30 tablet 0    atoMOXetine (STRATTERA) 40 mg capsule  (Patient not taking: Reported on 10/13/2023)       No current facility-administered medications for this visit. Allergies: Allergies   Allergen Reactions    Percocet [Oxycodone-Acetaminophen] Headache     Severe headaches   (denies issues with Tylenol)    Povidone Iodine Rash     Unsure if betadine or gauze post surgical. Got rash on leg. Has  Had itchy rashes after every surgery prep and IV insertion    Medical Tape Hives    Chlorhexidine Rash     Per pt "able to use the liquid soap--thinkd reaction from the sponges or wipes"         Physical Exam:    Body surface area is 2.42 meters squared.     Wt Readings from Last 3 Encounters:   10/13/23 (!) 142 kg (314 lb)   09/27/23 136 kg (300 lb)   09/22/23 (!) 139 kg (305 lb 6.4 oz)        Temp Readings from Last 3 Encounters:   10/13/23 97.8 °F (36.6 °C) (Temporal)   09/28/23 97.7 °F (36.5 °C) (Oral)   09/22/23 97.7 °F (36.5 °C) (Temporal)        BP Readings from Last 3 Encounters:   10/13/23 132/80   09/28/23 139/77   09/22/23 132/82         Pulse Readings from Last 3 Encounters:   10/13/23 69 09/28/23 77   09/22/23 77     @LASTSAO2(3)@    Physical Exam     Constitutional   General appearance: No acute distress, well appearing and well nourished. Eyes   Conjunctiva and lids: No swelling, erythema or discharge. Pupils and irises: Equal, round and reactive to light. Ears, Nose, Mouth, and Throat   External inspection of ears and nose: Normal.    Nasal mucosa, septum, and turbinates: Normal without edema or erythema. Oropharynx: Normal with no erythema, edema, exudate or lesions. Pulmonary   Respiratory effort: No increased work of breathing or signs of respiratory distress. Auscultation of lungs: Clear to auscultation. Cardiovascular   Palpation of heart: Normal PMI, no thrills. Auscultation of heart: Normal rate and rhythm, normal S1 and S2, without murmurs. Examination of extremities for edema and/or varicosities: Normal.    Carotid pulses: Normal.    Abdomen   Abdomen: Non-tender, no masses. Liver and spleen: No hepatomegaly or splenomegaly. Lymphatic   Palpation of lymph nodes in neck: No lymphadenopathy. Musculoskeletal   Gait and station: Normal.    Digits and nails: Normal without clubbing or cyanosis. Inspection/palpation of joints, bones, and muscles: Normal.    Skin   Skin and subcutaneous tissue: Normal without rashes or lesions. Neurologic   Cranial nerves: Cranial nerves 2-12 intact. Sensation: No sensory loss. Psychiatric   Orientation to person, place, and time: Normal.    Mood and affect: Normal.          Assessment/ Plan: Reviewed patient's history and hospital stays in detail. We discussed her pulmonary embolism was likely due to long history of drug use. Due to large saddle PE right heart strain and left lower extremity DVT, recommend patient continue anticoagulation with Coumadin indefinitely with INR goal between 2 -3. Patient is currently on Coumadin 4 mg daily.     Sofy Porter on 30 days sober from methamphetamine use and strongly encouraged her to continue complete cessation of methamphetamine. In addition, encourage patient to continue counseling twice a week. Spoke with pharmacist at patient's home pharmacy, [facility] Jayleen Ghosh, who reported patient has been off of Tegretol since February. We reviewed her medication list in detail and found that there is a moderate drug-drug interaction between Depakote and Eliquis. Reviewed with patient, due to the drug-drug interaction between Depakote and Eliquis, she will need to be on Coumadin indefinitely. There are no interactions between Coumadin and Depakote. Shantelle Mccarthy will obtain PT/INR after office visit and I will call her with further instructions. Discussed obtaining PT/INR weekly on Wednesdays, on patient's day off, until therapeutic dose is determined and we can discuss frequency of labs at that time. Patient agreeable with plan. Discussed the importance of compliance with taking the medication every day around the same time and not missing a dose. If a dose is missed, NOT to take a double dose the next day to make up for the missed dose. Discussed safety concerns while on anticoagulation:  Ambulate in a clear path, avoid falls. If patient falls, to call 911 and get to the hospital, especially if there is a head injury. If patient notices abnormal bleeding, to go to the hospital.  Patient verbalized understanding    Provided patient with direct RN phone number should any questions or concerns come up in the future. Encouraged her to continue counseling. She is aware she can contact us for worsening symptoms and/or if she has any additional questions or concerns. I spent 60 minutes in chart review, face to face counseling, coordination of care, and documentation.

## 2023-10-14 ENCOUNTER — TELEPHONE (OUTPATIENT)
Dept: HEMATOLOGY ONCOLOGY | Facility: CLINIC | Age: 53
End: 2023-10-14

## 2023-10-14 NOTE — TELEPHONE ENCOUNTER
Spoke with Sriram Gillespie to review her INR of 3.09. She already took her dose of Coumadin 4 mg daily today. Informed her to hold tomorrow's dose and restart Coumadin 4 mg on Monday. Going forward we will be getting weekly PT/INRs on Wednesday mornings, which is her day off. I will call her later in the day to go over instructions for Coumadin. Informed Anna Mariekatelin Jose Miguel that Depakote and Eliquis have an interaction, therefore, she will continue to be on Coumadin. Patient understands the instructions provided for her Coumadin and voiced understanding on the reasoning behind staying on Coumadin. She is aware to contact us if she should have any additional questions/concerns.

## 2023-10-15 ENCOUNTER — HOSPITAL ENCOUNTER (EMERGENCY)
Facility: HOSPITAL | Age: 53
Discharge: HOME | End: 2023-10-15
Attending: EMERGENCY MEDICINE
Payer: MEDICARE

## 2023-10-15 ENCOUNTER — NURSE TRIAGE (OUTPATIENT)
Dept: OTHER | Facility: OTHER | Age: 53
End: 2023-10-15

## 2023-10-15 ENCOUNTER — APPOINTMENT (EMERGENCY)
Dept: RADIOLOGY | Facility: HOSPITAL | Age: 53
End: 2023-10-15
Payer: MEDICARE

## 2023-10-15 VITALS
SYSTOLIC BLOOD PRESSURE: 124 MMHG | HEART RATE: 66 BPM | RESPIRATION RATE: 18 BRPM | BODY MASS INDEX: 47.09 KG/M2 | OXYGEN SATURATION: 97 % | TEMPERATURE: 97.9 F | HEIGHT: 66 IN | WEIGHT: 293 LBS | DIASTOLIC BLOOD PRESSURE: 60 MMHG

## 2023-10-15 DIAGNOSIS — R60.0 BILATERAL LOWER EXTREMITY EDEMA: Primary | ICD-10-CM

## 2023-10-15 LAB
ALBUMIN SERPL BCP-MCNC: 3.7 G/DL (ref 3.5–5)
ALP SERPL-CCNC: 50 U/L (ref 34–104)
ALT SERPL W P-5'-P-CCNC: 10 U/L (ref 7–52)
ANION GAP SERPL CALCULATED.3IONS-SCNC: 8 MMOL/L
APTT PPP: 54 SECONDS (ref 23–37)
AST SERPL W P-5'-P-CCNC: 12 U/L (ref 13–39)
BASOPHILS # BLD MANUAL: 0 THOUSAND/UL (ref 0–0.1)
BASOPHILS NFR MAR MANUAL: 0 % (ref 0–1)
BILIRUB SERPL-MCNC: 0.2 MG/DL (ref 0.2–1)
BNP SERPL-MCNC: 34 PG/ML (ref 0–100)
BUN SERPL-MCNC: 19 MG/DL (ref 5–25)
CALCIUM SERPL-MCNC: 8.5 MG/DL (ref 8.4–10.2)
CARDIAC TROPONIN I PNL SERPL HS: 3 NG/L
CHLORIDE SERPL-SCNC: 108 MMOL/L (ref 96–108)
CO2 SERPL-SCNC: 25 MMOL/L (ref 21–32)
CREAT SERPL-MCNC: 0.57 MG/DL (ref 0.6–1.3)
EOSINOPHIL # BLD MANUAL: 0.09 THOUSAND/UL (ref 0–0.4)
EOSINOPHIL NFR BLD MANUAL: 1 % (ref 0–6)
ERYTHROCYTE [DISTWIDTH] IN BLOOD BY AUTOMATED COUNT: 14.3 % (ref 11.6–15.1)
GFR SERPL CREATININE-BSD FRML MDRD: 106 ML/MIN/1.73SQ M
GLUCOSE SERPL-MCNC: 82 MG/DL (ref 65–140)
HCT VFR BLD AUTO: 43.9 % (ref 34.8–46.1)
HGB BLD-MCNC: 13.8 G/DL (ref 11.5–15.4)
INR PPP: 3.05 (ref 0.84–1.19)
LG PLATELETS BLD QL SMEAR: PRESENT
LYMPHOCYTES # BLD AUTO: 3.06 THOUSAND/UL (ref 0.6–4.47)
LYMPHOCYTES # BLD AUTO: 32 % (ref 14–44)
MCH RBC QN AUTO: 30 PG (ref 26.8–34.3)
MCHC RBC AUTO-ENTMCNC: 31.4 G/DL (ref 31.4–37.4)
MCV RBC AUTO: 95 FL (ref 82–98)
METAMYELOCYTES NFR BLD MANUAL: 1 % (ref 0–1)
MONOCYTES # BLD AUTO: 0.94 THOUSAND/UL (ref 0–1.22)
MONOCYTES NFR BLD: 11 % (ref 4–12)
NEUTROPHILS # BLD MANUAL: 4.34 THOUSAND/UL (ref 1.85–7.62)
NEUTS SEG NFR BLD AUTO: 51 % (ref 43–75)
PLATELET # BLD AUTO: 149 THOUSANDS/UL (ref 149–390)
PLATELET BLD QL SMEAR: ADEQUATE
PMV BLD AUTO: 10.1 FL (ref 8.9–12.7)
POTASSIUM SERPL-SCNC: 4.1 MMOL/L (ref 3.5–5.3)
PROT SERPL-MCNC: 6.6 G/DL (ref 6.4–8.4)
PROTHROMBIN TIME: 32.5 SECONDS (ref 11.6–14.5)
RBC # BLD AUTO: 4.6 MILLION/UL (ref 3.81–5.12)
RBC MORPH BLD: NORMAL
SODIUM SERPL-SCNC: 141 MMOL/L (ref 135–147)
VARIANT LYMPHS # BLD AUTO: 4 %
WBC # BLD AUTO: 8.5 THOUSAND/UL (ref 4.31–10.16)

## 2023-10-15 PROCEDURE — 80053 COMPREHEN METABOLIC PANEL: CPT

## 2023-10-15 PROCEDURE — 71045 X-RAY EXAM CHEST 1 VIEW: CPT

## 2023-10-15 PROCEDURE — 84484 ASSAY OF TROPONIN QUANT: CPT

## 2023-10-15 PROCEDURE — 85730 THROMBOPLASTIN TIME PARTIAL: CPT

## 2023-10-15 PROCEDURE — 93005 ELECTROCARDIOGRAM TRACING: CPT

## 2023-10-15 PROCEDURE — 36415 COLL VENOUS BLD VENIPUNCTURE: CPT

## 2023-10-15 PROCEDURE — 85007 BL SMEAR W/DIFF WBC COUNT: CPT

## 2023-10-15 PROCEDURE — 83880 ASSAY OF NATRIURETIC PEPTIDE: CPT

## 2023-10-15 PROCEDURE — 85610 PROTHROMBIN TIME: CPT

## 2023-10-15 PROCEDURE — 85027 COMPLETE CBC AUTOMATED: CPT

## 2023-10-15 RX ORDER — FUROSEMIDE 10 MG/ML
25 INJECTION INTRAMUSCULAR; INTRAVENOUS ONCE
Status: COMPLETED | OUTPATIENT
Start: 2023-10-15 | End: 2023-10-15

## 2023-10-15 RX ADMIN — FUROSEMIDE 25 MG: 10 INJECTION, SOLUTION INTRAMUSCULAR; INTRAVENOUS at 14:01

## 2023-10-15 NOTE — ED PROVIDER NOTES
History  Chief Complaint   Patient presents with    Leg Swelling     Pt here from home d/t increased b/l lower leg edema. Patient states have noticed they been swelling more than normal over the past week but this morning got worse  Pt has been ambulating as normal and taking lasix      Alyse Gordillo is a 47year old female with a history of hypertension, hyperlipidemia, bilateral lower extremity edema, pulmonary embolism, presenting with worsening bilateral lower extremity edema. She has chronic edema, but over the last week she has had worsening swelling in both of her legs, and this morning she had swelling in her feet which is unusual for her. She notes pressure in her legs, but no significant tenderness on one leg or the other. She denies any shortness of breath or chest pain. She takes 20 mg of Lasix daily and describes that she has been taking it as prescribed. Of note, patient was hospitalized a little over 1 month ago and diagnosed with pulmonary embolism and left lower extremity DVT, she is currently taking Coumadin. She has follow-up appointment scheduled with her PCP on Wednesday. History provided by:  Patient   used: No        Prior to Admission Medications   Prescriptions Last Dose Informant Patient Reported? Taking?    Diclofenac Sodium (VOLTAREN) 1 %  Self No No   Sig: Apply 2 g topically 4 (four) times a day   RABEprazole (ACIPHEX) 20 MG tablet  Self Yes No   Sig: Take 20 mg by mouth 2 (two) times a day   albuterol (Ventolin HFA) 90 mcg/act inhaler  Self No No   Sig: Inhale 2 puffs every 6 (six) hours as needed for wheezing   amLODIPine (NORVASC) 5 mg tablet  Self No No   Sig: TAKE 1 TABLET (5 MG TOTAL) BY MOUTH DAILY   atoMOXetine (STRATTERA) 40 mg capsule  Self Yes No   Patient not taking: Reported on 10/13/2023   buPROPion (WELLBUTRIN XL) 300 mg 24 hr tablet  Self No No   Sig: Take 1 tablet (300 mg total) by mouth daily   cholecalciferol (VITAMIN D3) 1,000 units tablet  Self No No   Sig: Take 1 tablet (1,000 Units total) by mouth daily   cloNIDine (CATAPRES) 0.1 mg tablet  Self No No   Sig: Take 1 tablet (0.1 mg total) by mouth every 12 (twelve) hours   divalproex sodium (DEPAKOTE) 250 mg DR tablet  Self No No   Sig: Take 3 tablets (750 mg total) by mouth in the morning   divalproex sodium (DEPAKOTE) 500 mg DR tablet  Self No No   Sig: Take 2 tablets (1,000 mg total) by mouth daily at bedtime   estradiol (ESTRACE) 0.5 MG tablet  Self No No   Sig: Take 1 tablet (0.5 mg total) by mouth daily   furosemide (LASIX) 20 mg tablet  Self No No   Sig: Take 1 tablet (20 mg total) by mouth daily   haloperidol decanoate (Haldol Decanoate) 50 mg/mL injection  Self Yes No   Sig: every 30 (thirty) days   hydrocortisone 2.5 % ointment  Self No No   Sig: Apply topically 2 (two) times a day   levothyroxine (Euthyrox) 125 mcg tablet  Self No No   Sig: Take 1 tablet (125 mcg total) by mouth daily in the early morning   lidocaine (LIDODERM) 5 %  Self No No   Sig: Apply 1 patch topically over 12 hours daily Remove & Discard patch within 12 hours or as directed by MD   losartan (COZAAR) 50 mg tablet  Self No No   Sig: TAKE ONE TABLET BY MOUTH DAILY   lurasidone (LATUDA) 40 mg tablet  Self No No   Sig: Take 1 tablet (40 mg total) by mouth daily with breakfast   lurasidone (LATUDA) 40 mg tablet  Self No No   Sig: Take 1 tablet (40 mg total) by mouth daily with dinner   memantine (NAMENDA) 10 mg tablet  Self No No   Sig: Take 1 tablet (10 mg total) by mouth daily   metoprolol tartrate (LOPRESSOR) 25 mg tablet  Self No No   Sig: TAKE ONE TABLET BY MOUTH EVERY 12 HOURS   naltrexone (REVIA) 50 mg tablet  Self No No   Sig: Take 1 tablet (50 mg total) by mouth daily   oxybutynin (DITROPAN) 5 mg tablet  Self No No   Sig: Take 1 tablet (5 mg total) by mouth 2 (two) times a day   pantoprazole (PROTONIX) 20 mg tablet  Self No No   Sig: Take 1 tablet (20 mg total) by mouth daily in the early morning   pilocarpine (SALAGEN) 5 mg tablet  Self Yes No   Sig: Take 5 mg by mouth 2 (two) times a day   rOPINIRole (REQUIP) 1 mg tablet  Self Yes No   Si mg   topiramate (TOPAMAX) 200 MG tablet  Self No No   Sig: Take 1 tablet (200 mg total) by mouth 2 (two) times a day   trospium chloride (SANCTURA) 20 mg tablet  Self Yes No   Sig: Take 20 mg by mouth 2 (two) times a day   warfarin (COUMADIN) 4 mg tablet  Self No No   Sig: Take 1 tablet (4 mg total) by mouth daily      Facility-Administered Medications: None       Past Medical History:   Diagnosis Date    Anxiety     Arthritis     oa cassandra knees    Asthma     good control- no medications    Yan's esophagus     Bipolar affective disorder (HCC)     Cannabis abuse with unspecified 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HCC)     Chronic pain disorder     lumbar    COPD (chronic obstructive pulmonary disease) (HCC)     CPAP (continuous positive airway pressure) dependence     Degenerative disc disease at L5-S1 level     Deliberate self-cutting     9/15/22per pt has not done recently-- does see a therapist and psychiatrist regularly    Depression 2008    Disease of thyroid gland     hypo    MARTINEZ (dyspnea on exertion)     per pt "has had this with exertion and not new"    Drug overdose 10/28/2008    suicide attempt and again drug overdose 2022-- [facility] and than transferred to [facility]    Dysphagia     Dyspnea     Edema     BLE    Family history of blood clots     GERD (gastroesophageal reflux disease)     Headache(784.0)     Headache, tension-type     High blood pressure     History of COVID-19 2020    Symptoms started on 2020 and then got worse. Today she is feeling a little bit better.   She is a candidate for monoclonal antibody infusion and set for 21 @ 1pm at [facility]. 21 - telemedicine -     Hyperlipidemia     Hypertension     Knee pain, bilateral     Especially right    Medical marijuana use     Memory loss     Migraines     Obesity     Overactive bladder     Pulmonary emboli (HCC)     Sjogren's disease ([address])     Sleep apnea     Stress incontinence     Suicidal ideations     Teeth missing     Tremor     per pt Tremors of Right Leg and both Arms    Visual impairment     Wears glasses        Past Surgical History:   Procedure Laterality Date    BREAST CYST EXCISION Right 1989    CARPAL TUNNEL RELEASE Left     CHOLECYSTECTOMY  05/2003    Laparoscopic    COLONOSCOPY      01/12/2009    DILATION AND CURETTAGE OF UTERUS      ELBOW SURGERY Left     x2.  No hardware    ESOPHAGOGASTRODUODENOSCOPY      FOOT SURGERY Left     Plantar fasciotomy    HERNIA REPAIR      HYSTERECTOMY      laporoscopic, partial    KNEE ARTHROSCOPY Bilateral     OOPHORECTOMY Left 10/2015    MI GASTROCNEMIUS RECESSION Left 02/24/2021    Procedure: RECESSION GASTROC OPEN;  Surgeon: Lane Mead DPM;  Location: [facility] MAIN OR;  Service: Podiatry    MI RPR UMBILICAL HRNA 5 YRS/> REDUCIBLE N/A 04/24/2019    Procedure: REPAIR HERNIA UMBILICAL LAPAROSCOPIC W/ ROBOTICS;  Surgeon: Shanti Uriarte MD;  Location: [facility] Main OR;  Service: General    MI SPHINCTEROTOMY ANAL DIVISION SPHINCTER SPX N/A 08/31/2018    Procedure: EUA, LEFT PARTIAL INTERNAL SPHINCTEROTOMY;  Surgeon: Angel Luis Fong MD;  Location: [facility] MAIN OR;  Service: Colorectal    MI TNOT ELBOW LATERAL/MEDIAL DEBRIDE OPEN TDN RPR Left 09/20/2022    Procedure: RELEASE EPICONDYLAR ELBOW MEDIAL;  Surgeon: Ceci Boland MD;  Location[facility] MAIN OR;  Service: Orthopedics    REDUCTION MAMMAPLASTY Bilateral 1999    REPAIR LACERATION Left     left hand-5/18/2009    REPLACEMENT TOTAL KNEE Right     ROTATOR CUFF REPAIR Left     TONSILECTOMY AND ADNOIDECTOMY      US GUIDED MSK PROCEDURE  04/22/2021    VASCULAR SURGERY      VEIN LIGATION Bilateral     WISDOM TOOTH EXTRACTION         Family History   Problem Relation Age of Onset    Kidney cancer Mother     Diabetes Mother     Depression Mother     Stroke Mother     Arthritis Mother     Cancer Mother Psychiatric Illness Mother     No Known Problems Father     No Known Problems Sister     No Known Problems Maternal Grandmother     No Known Problems Maternal Grandfather     No Known Problems Paternal Grandmother     No Known Problems Paternal Grandfather     Colon cancer Maternal Uncle     Colon cancer Maternal Uncle     Colon cancer Paternal Uncle     Colon cancer Family     Alcohol abuse Sister     Asthma Sister      I have reviewed and agree with the history as documented. E-Cigarette/Vaping    E-Cigarette Use Current Some Day User     Comments medical THC      E-Cigarette/Vaping Substances    Nicotine No     THC Yes     CBD No     Flavoring No     Other No     Unknown No      Social History     Tobacco Use    Smoking status: Former     Packs/day: 2.00     Years: 30.00     Total pack years: 60.00     Types: Cigarettes     Start date: 1985     Quit date: 2018     Years since quittin.7    Smokeless tobacco: Never    Tobacco comments:     Started at age 14. Vaping Use    Vaping Use: Some days    Substances: THC   Substance Use Topics    Alcohol use: Not Currently     Comment: Recovering alcoholic    Drug use: Yes     Types: Marijuana, Methamphetamines     Comment: once month off meth        Review of Systems   Constitutional:  Negative for chills and fever. HENT:  Negative for ear pain and sore throat. Eyes:  Negative for pain and visual disturbance. Respiratory:  Negative for cough and shortness of breath. Cardiovascular:  Positive for leg swelling. Negative for chest pain and palpitations. Gastrointestinal:  Negative for abdominal pain and vomiting. Genitourinary:  Negative for dysuria and hematuria. Musculoskeletal:  Negative for arthralgias and back pain. Skin:  Negative for color change and rash. Neurological:  Negative for seizures and syncope. All other systems reviewed and are negative.       Physical Exam  ED Triage Vitals   Temperature Pulse Respirations Blood Pressure SpO2   10/15/23 1332 10/15/23 1332 10/15/23 1332 10/15/23 1332 10/15/23 1332   97.9 °F (36.6 °C) 66 20 135/70 93 %      Temp Source Heart Rate Source Patient Position - Orthostatic VS BP Location FiO2 (%)   10/15/23 1332 10/15/23 1332 10/15/23 1517 10/15/23 1517 --   Oral Monitor Sitting Right arm       Pain Score       --                    Orthostatic Vital Signs  Vitals:    10/15/23 1332 10/15/23 1401 10/15/23 1517 10/15/23 1530   BP: 135/70 120/58 124/60    Pulse: 66 62 65 66   Patient Position - Orthostatic VS:   Sitting        Physical Exam  Vitals and nursing note reviewed. Constitutional:       Appearance: Normal appearance. HENT:      Head: Normocephalic and atraumatic. Mouth/Throat:      Mouth: Mucous membranes are moist.      Pharynx: Oropharynx is clear. Eyes:      General: No scleral icterus. Conjunctiva/sclera: Conjunctivae normal.   Cardiovascular:      Rate and Rhythm: Normal rate and regular rhythm. Heart sounds: Normal heart sounds. Pulmonary:      Effort: Pulmonary effort is normal. No respiratory distress. Breath sounds: Normal breath sounds. Abdominal:      General: Abdomen is flat. There is no distension. Tenderness: There is no abdominal tenderness. Musculoskeletal:         General: No tenderness or signs of injury. Cervical back: Neck supple. No rigidity. Right lower leg: No tenderness. 3+ Edema present. Left lower leg: No tenderness. 3+ Edema present. Comments: Patient has edema from her knees the whole way down to both of her feet, nonpitting, no significant areas of tenderness, neurovascularly intact distally. Skin:     General: Skin is warm. Coloration: Skin is not jaundiced. Findings: No erythema or rash. Neurological:      General: No focal deficit present. Mental Status: She is alert. Mental status is at baseline.    Psychiatric:         Mood and Affect: Mood normal.         Behavior: Behavior normal. ED Medications  Medications   furosemide (LASIX) injection 25 mg (25 mg Intravenous Given 10/15/23 1401)       Diagnostic Studies  Results Reviewed       Procedure Component Value Units Date/Time    RBC Morphology Reflex Test [[id number]] Collected: 10/15/23 1400    Lab Status: Final result Specimen: Blood from Arm, Left Updated: 10/15/23 1602    B-Type Natriuretic Peptide(BNP) [[id number]]  (Normal) Collected: 10/15/23 1400    Lab Status: Final result Specimen: Blood from Arm, Left Updated: 10/15/23 1539     BNP 34 pg/mL     Protime-INR [[id number]]  (Abnormal) Collected: 10/15/23 1438    Lab Status: Final result Specimen: Blood from Arm, Left Updated: 10/15/23 1504     Protime 32.5 seconds      INR 3.05    APTT [[id number]]  (Abnormal) Collected: 10/15/23 1438    Lab Status: Final result Specimen: Blood from Arm, Left Updated: 10/15/23 1504     PTT 54 seconds     CBC and differential [[id number]]  (Normal) Collected: 10/15/23 1400    Lab Status: Final result Specimen: Blood from Arm, Left Updated: 10/15/23 1502     WBC 8.50 Thousand/uL      RBC 4.60 Million/uL      Hemoglobin 13.8 g/dL      Hematocrit 43.9 %      MCV 95 fL      MCH 30.0 pg      MCHC 31.4 g/dL      RDW 14.3 %      MPV 10.1 fL      Platelets 698 Thousands/uL     Narrative: This is an appended report. These results have been appended to a previously verified report.     Manual Differential(PHLEBS Do Not Order) [[id number]]  (Abnormal) Collected: 10/15/23 1400    Lab Status: Final result Specimen: Blood from Arm, Left Updated: 10/15/23 1502     Segmented % 51 %      Lymphocytes % 32 %      Monocytes % 11 %      Eosinophils, % 1 %      Basophils % 0 %      Metamyelocytes% 1 %      Atypical Lymphocytes % 4 %      Absolute Neutrophils 4.34 Thousand/uL      Lymphocytes Absolute 3.06 Thousand/uL      Monocytes Absolute 0.94 Thousand/uL      Eosinophils Absolute 0.09 Thousand/uL      Basophils Absolute 0.00 Thousand/uL      Total Counted --     RBC Morphology Normal     Platelet Estimate Adequate     Large Platelet Present    HS Troponin 0hr (reflex protocol) [[id number]]  (Normal) Collected: 10/15/23 1400    Lab Status: Final result Specimen: Blood from Arm, Left Updated: 10/15/23 1450     hs TnI 0hr 3 ng/L     Comprehensive metabolic panel [[id number]]  (Abnormal) Collected: 10/15/23 1400    Lab Status: Final result Specimen: Blood from Arm, Left Updated: 10/15/23 1444     Sodium 141 mmol/L      Potassium 4.1 mmol/L      Chloride 108 mmol/L      CO2 25 mmol/L      ANION GAP 8 mmol/L      BUN 19 mg/dL      Creatinine 0.57 mg/dL      Glucose 82 mg/dL      Calcium 8.5 mg/dL      AST 12 U/L      ALT 10 U/L      Alkaline Phosphatase 50 U/L      Total Protein 6.6 g/dL      Albumin 3.7 g/dL      Total Bilirubin 0.20 mg/dL      eGFR 106 ml/min/1.73sq m     Narrative:      [facility] guidelines for Chronic Kidney Disease (CKD):     Stage 1 with normal or high GFR (GFR > 90 mL/min/1.73 square meters)    Stage 2 Mild CKD (GFR = 60-89 mL/min/1.73 square meters)    Stage 3A Moderate CKD (GFR = 45-59 mL/min/1.73 square meters)    Stage 3B Moderate CKD (GFR = 30-44 mL/min/1.73 square meters)    Stage 4 Severe CKD (GFR = 15-29 mL/min/1.73 square meters)    Stage 5 End Stage CKD (GFR <15 mL/min/1.73 square meters)  Note: GFR calculation is accurate only with a steady state creatinine                   XR chest 1 view portable   ED Interpretation by Ruddy Cheng DO (10/15 1446)   No acute cardiopulmonary abnormalities visualized            Procedures  ECG 12 Lead Documentation Only    Date/Time: 10/15/2023 2:37 PM    Performed by: Ruddy Cheng DO  Authorized by: Ruddy Cheng DO    Indications / Diagnosis:  Bilateral lower extremity edema  ECG reviewed by me, the ED Provider: yes    Patient location:  ED  Previous ECG:     Previous ECG:  Compared to current    Similarity:  No change  Interpretation: Interpretation: normal    Rate:     ECG rate:  64    ECG rate assessment: normal    Rhythm:     Rhythm: sinus rhythm and A-V block      Rhythm comment:  First-degree AV block  Ectopy:     Ectopy: none    QRS:     QRS axis:  Normal    QRS intervals:  Normal  Conduction:     Conduction: normal    ST segments:     ST segments:  Normal  T waves:     T waves: normal          ED Course             HEART Risk Score      Flowsheet Row Most Recent Value   Heart Score Risk Calculator    History 0 Filed at: 10/15/2023 1522   ECG 0 Filed at: 10/15/2023 1522   Age 1 Filed at: 10/15/2023 1522   Risk Factors 2 Filed at: 10/15/2023 1522   Troponin 0 Filed at: 10/15/2023 1522   HEART Score 3 Filed at: 10/15/2023 1522                        SBIRT 20yo+      Flowsheet Row Most Recent Value   Initial Alcohol Screen: US AUDIT-C     1. How often do you have a drink containing alcohol? 0 Filed at: 10/15/2023 1333   2. How many drinks containing alcohol do you have on a typical day you are drinking? 0 Filed at: 10/15/2023 1333   3a. Male UNDER 65: How often do you have five or more drinks on one occasion? 0 Filed at: 10/15/2023 1333   3b. FEMALE Any Age, or MALE 65+: How often do you have 4 or more drinks on one occassion? 0 Filed at: 10/15/2023 1333   Audit-C Score 0 Filed at: 10/15/2023 1333   OLEG: How many times in the past year have you. .. Used an illegal drug or used a prescription medication for non-medical reasons? Never Filed at: 10/15/2023 [address] [place] [address],[address]  Barrett Nogueira is a 47year old female with a history of hypertension, hyperlipidemia, bilateral lower extremity edema, pulmonary embolism, presenting with worsening bilateral lower extremity edema. With her history of lower extremity edema, she takes 20 mg of Lasix daily. She always has lower extremity edema, the last week it is worsened and she now has edema of her feet chest pain or shortness of breath.   Patient was overall well-appearing on exam, vital signs were unremarkable, heart lung sounds are normal.  Bilateral lower extremity edema, nonpitting, extending from knees to bilateral feet. No tenderness to palpation. Neurovascularly intact distally. Suspect that her edema today is related to her chronic edema, lower suspicion for acute cardiac cause, renal cause, as she is otherwise asymptomatic and overall well-appearing. Patient's lab work was unremarkable, renal function was normal and at baseline, troponin of 3, BNP was unremarkable. She is slightly supratherapeutic on her Coumadin. Chest x-ray unremarkable. Patient was given 25 mg of Lasix here in the ED. I recommended that she double her dose of Lasix that she follows up with her PCP in several days, I gave very strict return precautions, she was understanding and agreeable to plan. Problems Addressed:  Bilateral lower extremity edema: acute illness or injury    Amount and/or Complexity of Data Reviewed  Labs: ordered. Radiology: ordered and independent interpretation performed. Risk  Prescription drug management. Disposition  Final diagnoses:   Bilateral lower extremity edema     Time reflects when diagnosis was documented in both MDM as applicable and the Disposition within this note       Time User Action Codes Description Comment    10/15/2023  3:21 PM Vasile Morales Add [R60.0] Bilateral lower extremity edema           ED Disposition       ED Disposition   Discharge    Condition   Stable    Date/Time   Sun Oct 15, 2023 0447 [id number] [facility] 5 discharge to home/self care.                    Follow-up Information       Follow up With Specialties Details Why [address] Nurse Practitioner Schedule an appointment as soon as possible for a visit   [address] [facility] 352 [address]  [phone]              Discharge Medication List as of 10/15/2023  3:22 PM        CONTINUE these medications which have NOT CHANGED    Details   albuterol (Ventolin HFA) 90 mcg/act inhaler Inhale 2 puffs every 6 (six) hours as needed for wheezing, Starting Fri 9/15/2023, Normal      amLODIPine (NORVASC) 5 mg tablet TAKE 1 TABLET (5 MG TOTAL) BY MOUTH DAILY, Starting Wed 7/12/2023, Normal      atoMOXetine (STRATTERA) 40 mg capsule Historical Med      buPROPion (WELLBUTRIN XL) 300 mg 24 hr tablet Take 1 tablet (300 mg total) by mouth daily, Starting Fri 10/6/2023, Until Sun 11/5/2023, Normal      cholecalciferol (VITAMIN D3) 1,000 units tablet Take 1 tablet (1,000 Units total) by mouth daily, Starting Fri 10/6/2023, Until Sun 11/5/2023, Normal      cloNIDine (CATAPRES) 0.1 mg tablet Take 1 tablet (0.1 mg total) by mouth every 12 (twelve) hours, Starting Fri 10/6/2023, Until Sun 11/5/2023, Normal      Diclofenac Sodium (VOLTAREN) 1 % Apply 2 g topically 4 (four) times a day, Starting Mon 7/17/2023, Normal      !! divalproex sodium (DEPAKOTE) 250 mg DR tablet Take 3 tablets (750 mg total) by mouth in the morning, Starting Fri 10/6/2023, Until Sun 11/5/2023, Normal      !! divalproex sodium (DEPAKOTE) 500 mg DR tablet Take 2 tablets (1,000 mg total) by mouth daily at bedtime, Starting Fri 10/6/2023, Until Sun 11/5/2023, Normal      estradiol (ESTRACE) 0.5 MG tablet Take 1 tablet (0.5 mg total) by mouth daily, Starting Thu 5/4/2023, Normal      furosemide (LASIX) 20 mg tablet Take 1 tablet (20 mg total) by mouth daily, Starting Fri 10/6/2023, Normal      haloperidol decanoate (Haldol Decanoate) 50 mg/mL injection every 30 (thirty) days, Starting Wed 8/3/2022, Historical Med      hydrocortisone 2.5 % ointment Apply topically 2 (two) times a day, Starting Tue 10/25/2022, Normal      levothyroxine (Euthyrox) 125 mcg tablet Take 1 tablet (125 mcg total) by mouth daily in the early morning, Starting Fri 10/6/2023, Until Sun 11/5/2023, Normal      lidocaine (LIDODERM) 5 % Apply 1 patch topically over 12 hours daily Remove & Discard patch within 12 hours or as directed by MD, Starting Fri 10/6/2023, No Print      losartan (COZAAR) 50 mg tablet TAKE ONE TABLET BY MOUTH DAILY, Normal      !! lurasidone (LATUDA) 40 mg tablet Take 1 tablet (40 mg total) by mouth daily with breakfast, Starting Fri 10/6/2023, Until Sun 11/5/2023, Normal      !! lurasidone (LATUDA) 40 mg tablet Take 1 tablet (40 mg total) by mouth daily with dinner, Starting Fri 10/6/2023, Until Sun 11/5/2023, Normal      memantine (NAMENDA) 10 mg tablet Take 1 tablet (10 mg total) by mouth daily, Starting Fri 10/6/2023, Until Sun 11/5/2023, Normal      metoprolol tartrate (LOPRESSOR) 25 mg tablet TAKE ONE TABLET BY MOUTH EVERY 12 HOURS, Normal      naltrexone (REVIA) 50 mg tablet Take 1 tablet (50 mg total) by mouth daily, Starting Fri 10/6/2023, Until Sun 11/5/2023, Normal      oxybutynin (DITROPAN) 5 mg tablet Take 1 tablet (5 mg total) by mouth 2 (two) times a day, Starting Fri 10/6/2023, Until Sun 11/5/2023, Normal      pantoprazole (PROTONIX) 20 mg tablet Take 1 tablet (20 mg total) by mouth daily in the early morning, Starting Fri 10/6/2023, Until Sun 11/5/2023, Normal      pilocarpine (SALAGEN) 5 mg tablet Take 5 mg by mouth 2 (two) times a day, Historical Med      RABEprazole (ACIPHEX) 20 MG tablet Take 20 mg by mouth 2 (two) times a day, Historical Med      rOPINIRole (REQUIP) 1 mg tablet 1 mg, Starting Fri 10/13/2023, Historical Med      topiramate (TOPAMAX) 200 MG tablet Take 1 tablet (200 mg total) by mouth 2 (two) times a day, Starting Fri 10/6/2023, Until Sun 11/5/2023, Normal      trospium chloride (SANCTURA) 20 mg tablet Take 20 mg by mouth 2 (two) times a day, Starting Thu 1/12/2023, Until Fri 1/12/2024, Historical Med      warfarin (COUMADIN) 4 mg tablet Take 1 tablet (4 mg total) by mouth daily, Starting Fri 10/6/2023, Until Sun 11/5/2023, Normal       !! - Potential duplicate medications found. Please discuss with provider. No discharge procedures on file.     PDMP Review         Value Time User PDMP Reviewed  Yes 9/29/2023  8:09 AM Danny Jean-Baptiste MD             ED Provider  Attending physically available and evaluated Carmen Albarran. I managed the patient along with the ED Attending.     Electronically Signed by           Donita Canavan, DO  10/15/23 7210

## 2023-10-15 NOTE — TELEPHONE ENCOUNTER
Reason for Disposition   [1] Thigh, calf, or ankle swelling AND [2] bilateral AND [3] 1 side is more swollen    Answer Assessment - Initial Assessment Questions  1. ONSET: "When did the swelling start?" (e.g., minutes, hours, days)      Noticed feet were swollen about a week ago, but looks worse today    2. LOCATION: "What part of the leg is swollen?"  "Are both legs swollen or just one leg?"      Both legs/knee down to feet. Right leg is worse than left    3. SEVERITY: "How bad is the swelling?" (e.g., localized; mild, moderate, severe)   - Localized - small area of swelling localized to one leg   - MILD pedal edema - swelling limited to foot and ankle, pitting edema < 1/4 inch (6 mm) deep, rest and elevation eliminate most or all swelling   - MODERATE edema - swelling of lower leg to knee, pitting edema > 1/4 inch (6 mm) deep, rest and elevation only partially reduce swelling   - SEVERE edema - swelling extends above knee, facial or hand swelling present       Moderate    4. REDNESS: "Does the swelling look red or infected?"      Both shins down to ankle appear to be red    5. PAIN: "Is the swelling painful to touch?" If Yes, ask: "How painful is it?"   (Scale 1-10; mild, moderate or severe)      Right now, 7/10    6. FEVER: "Do you have a fever?" If Yes, ask: "What is it, how was it measured, and when did it start?"       No fever    7. CAUSE: "What do you think is causing the leg swelling?"      Unknown. Recent DVT    8. MEDICAL HISTORY: "Do you have a history of heart failure, kidney disease, liver failure, or cancer?"      See chart    9. RECURRENT SYMPTOM: "Have you had leg swelling before?" If Yes, ask: "When was the last time?" "What happened that time?"      Yes - on Lasix, but states she has missed doses "here and there"    10.  OTHER SYMPTOMS: "Do you have any other symptoms?" (e.g., chest pain, difficulty breathing)        No other symptoms    Protocols used: Leg Swelling and [facility] advice and recommendations given. Instructed what to monitor for and when to call back. Questions and concerns addressed. Patient verbalized understanding and states she will try and get a ride [facility] to get a further assessment.

## 2023-10-15 NOTE — TELEPHONE ENCOUNTER
Regarding: Swelling in the legs  ----- Message from Jos Kern sent at 10/15/2023 12:08 PM EDT -----  "I am calling back because my leg and my feet are swelling so bad "    ----- Message from [facility] sent at 10/15/2023 10:32 AM EDT -----  "Pt called in with some swelling in both feet mostly on the right due to the knee replacement is that something she should be concerned about?'

## 2023-10-16 ENCOUNTER — PATIENT OUTREACH (OUTPATIENT)
Dept: CASE MANAGEMENT | Facility: OTHER | Age: 53
End: 2023-10-16

## 2023-10-16 ENCOUNTER — EPISODE CHANGES (OUTPATIENT)
Dept: CASE MANAGEMENT | Facility: OTHER | Age: 53
End: 2023-10-16

## 2023-10-16 PROBLEM — I82.4Z2 ACUTE DEEP VEIN THROMBOSIS OF LOWER LEG, LEFT (HCC): Status: ACTIVE | Noted: 2023-10-16

## 2023-10-16 PROBLEM — I26.92 ACUTE SADDLE PULMONARY EMBOLISM (HCC): Status: ACTIVE | Noted: 2023-10-16

## 2023-10-16 NOTE — PROGRESS NOTES
Outpatient Care Management Note:  In basket referral for the [employer] program received. Chart review completed. Chart review completed for the following sections:  Recent Vital Signs  Allergies/Contradictions  Medication Review   History   [facility]   Problem List  Immunizations  Past hospitalizations and major procedures, including surgery  Significant past illnesses and treatment history including: History and Physical, Other provider notes, PT, OT, ST, Medications/Infusions/Transfusions, Diet/Fluid restrictions, and lab and radiology results  Relevant past medications related to the patient's condition  Outreach call scheduled.

## 2023-10-17 ENCOUNTER — TELEPHONE (OUTPATIENT)
Dept: NEUROLOGY | Facility: CLINIC | Age: 53
End: 2023-10-17

## 2023-10-17 LAB
ATRIAL RATE: 64 BPM
P AXIS: 36 DEGREES
PR INTERVAL: 210 MS
QRS AXIS: -23 DEGREES
QRSD INTERVAL: 88 MS
QT INTERVAL: 396 MS
QTC INTERVAL: 408 MS
T WAVE AXIS: 54 DEGREES
VENTRICULAR RATE: 64 BPM

## 2023-10-17 PROCEDURE — 93010 ELECTROCARDIOGRAM REPORT: CPT | Performed by: INTERNAL MEDICINE

## 2023-10-17 NOTE — TELEPHONE ENCOUNTER
Patient called wants medicine for tremors. It is increasing patient does not know what to do. It is interfering with her job she couldn't go to work today. She also want Dr Galo Engle  to contact her  psych doctor to see what medication she can take.

## 2023-10-17 NOTE — ED ATTENDING ATTESTATION
10/15/2023  INevaeh DO, saw and evaluated the patient. I have discussed the patient with the resident/non-physician practitioner and agree with the resident's/non-physician practitioner's findings, Plan of Care, and MDM as documented in the resident's/non-physician practitioner's note, except where noted. All available labs and Radiology studies were reviewed. I was present for key portions of any procedure(s) performed by the resident/non-physician practitioner and I was immediately available to provide assistance. At this point I agree with the current assessment done in the Emergency Department. I have conducted an independent evaluation of this patient a history and physical is as follows:    41-year-old female coming into the ED for evaluation of bilateral lower extremity edema. Has history of CHF, PE on Coumadin. She denies any shortness of breath. She does takes Lasix 20 mg daily, as prescribed. PE:  The patient is well appearing, non-toxic, in NAD. She is morbidly obese. Head: normocephalic, atraumatic. HEENT: mucous membranes moist.  Lungs: CTA b/l, no resp distress. Heart: RRR. No M/R/G. Abdomen: NT, ND, no R/R/G. Neuro: CN2-12 intact, GCS 15. Normal strength and sensation, normal speech and gait. Cap refill < 2 sec, skin warm and dry. No rashes or lesions. MSK: b/l lower leg pitting edema, equal b/l, no calf tenderness, neg dari's sign b/l. Treat for mild CHF exacerbation. Fortunately, she is not short of breath and does not exhibit signs of pulmonary edema or pleural effusions. IV dose of Lasix given in the ED, stable for discharge home, with increased oral Lasix regimen, follow-up with PCP as scheduled this week.       ED Course         Critical Care Time  Procedures

## 2023-10-18 ENCOUNTER — APPOINTMENT (OUTPATIENT)
Dept: LAB | Facility: CLINIC | Age: 53
End: 2023-10-18
Payer: MEDICARE

## 2023-10-18 ENCOUNTER — TELEPHONE (OUTPATIENT)
Dept: HEMATOLOGY ONCOLOGY | Facility: CLINIC | Age: 53
End: 2023-10-18

## 2023-10-18 ENCOUNTER — OFFICE VISIT (OUTPATIENT)
Dept: FAMILY MEDICINE CLINIC | Facility: CLINIC | Age: 53
End: 2023-10-18

## 2023-10-18 VITALS
HEIGHT: 66 IN | SYSTOLIC BLOOD PRESSURE: 124 MMHG | WEIGHT: 293 LBS | HEART RATE: 65 BPM | TEMPERATURE: 97.8 F | BODY MASS INDEX: 47.09 KG/M2 | OXYGEN SATURATION: 98 % | DIASTOLIC BLOOD PRESSURE: 70 MMHG

## 2023-10-18 DIAGNOSIS — G89.29 CHRONIC PAIN OF LEFT KNEE: ICD-10-CM

## 2023-10-18 DIAGNOSIS — Z23 ENCOUNTER FOR IMMUNIZATION: ICD-10-CM

## 2023-10-18 DIAGNOSIS — J44.9 CHRONIC OBSTRUCTIVE PULMONARY DISEASE, UNSPECIFIED COPD TYPE (HCC): ICD-10-CM

## 2023-10-18 DIAGNOSIS — R60.9 EDEMA, UNSPECIFIED TYPE: ICD-10-CM

## 2023-10-18 DIAGNOSIS — M25.562 CHRONIC PAIN OF LEFT KNEE: ICD-10-CM

## 2023-10-18 DIAGNOSIS — I10 HYPERTENSION, UNSPECIFIED TYPE: ICD-10-CM

## 2023-10-18 DIAGNOSIS — M17.0 PRIMARY OSTEOARTHRITIS OF BOTH KNEES: ICD-10-CM

## 2023-10-18 DIAGNOSIS — M77.01 MEDIAL EPICONDYLITIS OF RIGHT ELBOW: ICD-10-CM

## 2023-10-18 PROBLEM — K62.5 HEMORRHAGE OF RECTUM AND ANUS: Status: RESOLVED | Noted: 2017-10-02 | Resolved: 2023-10-18

## 2023-10-18 PROBLEM — E66.01 MORBID OBESITY WITH BMI OF 40.0-44.9, ADULT (HCC): Status: RESOLVED | Noted: 2021-01-04 | Resolved: 2023-10-18

## 2023-10-18 PROBLEM — E66.01 MORBID OBESITY WITH BMI OF 45.0-49.9, ADULT (HCC): Status: RESOLVED | Noted: 2023-09-29 | Resolved: 2023-10-18

## 2023-10-18 LAB
INR PPP: 2.67 (ref 0.84–1.19)
PROTHROMBIN TIME: 27.9 SECONDS (ref 11.6–14.5)

## 2023-10-18 PROCEDURE — 85610 PROTHROMBIN TIME: CPT

## 2023-10-18 PROCEDURE — 36415 COLL VENOUS BLD VENIPUNCTURE: CPT

## 2023-10-18 RX ORDER — FUROSEMIDE 40 MG/1
40 TABLET ORAL DAILY
Qty: 60 TABLET | Refills: 0 | Status: SHIPPED | OUTPATIENT
Start: 2023-10-18

## 2023-10-18 NOTE — ASSESSMENT & PLAN NOTE
Muscular pain over the medial epicondyle related to over use at work   Has brace from previous epicondylitis but no relief of pain   Relief is provided from rest   Exercises given   Voltaren gel to use as needed.    Previous interventions failed   Referral placed to orthopedics

## 2023-10-18 NOTE — ASSESSMENT & PLAN NOTE
Recurrent COPD exacerbations and shortness of breath   Was previously increased to 20mg BID from ER and told to follow up with PCP   New medication: Furosemide 40mg once daily   Continue to use albuterol inhaler as needed and fluticasone daily

## 2023-10-18 NOTE — ASSESSMENT & PLAN NOTE
Chronic lower extremity edema   Previous medication: Furosemide 20mg  Recurrent COPD exacerbations and shortness of breath   Was previously increased to 20mg BID from ER and told to follow up with PCP   New medication: Furosemide 40mg once daily

## 2023-10-18 NOTE — TELEPHONE ENCOUNTER
LVM for corey to inform her that her INR is therapeutic, continue taking coumadin 4 mg daily. Next check is next Wednesday.

## 2023-10-18 NOTE — ASSESSMENT & PLAN NOTE
Current weight 318lbs   BMI 51.33   Encouraged diet and lifestyle modifications   Referral to weight management placed.

## 2023-10-18 NOTE — TELEPHONE ENCOUNTER
Patient called in crying this morning, states she needs tremors medication was unable to do her job and that her job is on the line if nothing gets straightened out as far as her medication. Patient also stated that Dr Shaun Gonzales is suppose to call Dr Julian Dc in [facility].  Please assist.

## 2023-10-18 NOTE — PROGRESS NOTES
Name: Yamini Finney      : 1970      MRN: [id number]  Encounter Provider: JHONATAN Queen  Encounter Date: 10/18/2023   Encounter department: [address] [place] [address] [place]     1. BMI 50.0-59.9, adult ([facility])  Assessment & Plan:  Current weight 318lbs   BMI 51.33   Encouraged diet and lifestyle modifications   Referral to weight management placed. Orders:  -     Ambulatory referral to Weight Management; Future    2. Chronic obstructive pulmonary disease, unspecified COPD type ([address])  Assessment & Plan:  Recurrent COPD exacerbations and shortness of breath   Was previously increased to 20mg BID from ER and told to follow up with PCP   New medication: Furosemide 40mg once daily   Continue to use albuterol inhaler as needed and fluticasone daily    Orders:  -     furosemide (LASIX) 40 mg tablet; Take 1 tablet (40 mg total) by mouth daily    3. Chronic pain of left knee  -     Ambulatory Referral to Orthopedic Surgery; Future    4. Medial epicondylitis of right elbow  Assessment & Plan:  Muscular pain over the medial epicondyle related to over use at work   Has brace from previous epicondylitis but no relief of pain   Relief is provided from rest   Exercises given   Voltaren gel to use as needed. Previous interventions failed   Referral placed to orthopedics    Orders:  -     Ambulatory Referral to Orthopedic Surgery; Future    5. Encounter for immunization  -     influenza vaccine, quadrivalent, 0.5 mL, preservative-free, for adult and pediatric patients 6 mos+ (AFLURIA, FLUARIX, FLULAVAL, FLUZONE)    6. Edema, unspecified type  Assessment & Plan:  Chronic lower extremity edema   Previous medication: Furosemide 20mg  Recurrent COPD exacerbations and shortness of breath   Was previously increased to 20mg BID from ER and told to follow up with PCP   New medication: Furosemide 40mg once daily       7.  Primary osteoarthritis of both knees  Assessment & Plan:  Recurrent pain   Ambulates with cane   Referral to orthopedics made              Subjective      Here for multiple concerns   Recurring pain in the right elbow and left knee would like a referral to orthopedics   Interested in weight loss medications and management       Review of Systems   Constitutional:  Negative for activity change, chills, fatigue and fever. HENT:  Negative for congestion, ear pain, rhinorrhea, sore throat and trouble swallowing. Eyes:  Negative for pain and visual disturbance. Respiratory:  Negative for cough, chest tightness and shortness of breath. Cardiovascular:  Negative for chest pain, palpitations and leg swelling. Gastrointestinal:  Negative for abdominal pain, constipation, diarrhea, nausea and vomiting. Genitourinary:  Negative for difficulty urinating, dysuria, hematuria and urgency. Musculoskeletal:  Negative for arthralgias and back pain. Skin:  Negative for color change and rash. Neurological:  Negative for dizziness, seizures, syncope and headaches. Psychiatric/Behavioral:  Negative for dysphoric mood. The patient is not nervous/anxious. All other systems reviewed and are negative.       Current Outpatient Medications on File Prior to Visit   Medication Sig    albuterol (Ventolin HFA) 90 mcg/act inhaler Inhale 2 puffs every 6 (six) hours as needed for wheezing    amLODIPine (NORVASC) 5 mg tablet TAKE 1 TABLET (5 MG TOTAL) BY MOUTH DAILY    buPROPion (WELLBUTRIN XL) 300 mg 24 hr tablet Take 1 tablet (300 mg total) by mouth daily    cholecalciferol (VITAMIN D3) 1,000 units tablet Take 1 tablet (1,000 Units total) by mouth daily    cloNIDine (CATAPRES) 0.1 mg tablet Take 1 tablet (0.1 mg total) by mouth every 12 (twelve) hours    Diclofenac Sodium (VOLTAREN) 1 % Apply 2 g topically 4 (four) times a day    divalproex sodium (DEPAKOTE) 250 mg DR tablet Take 3 tablets (750 mg total) by mouth in the morning    divalproex sodium (DEPAKOTE) 500 mg DR tablet Take 2 tablets (1,000 mg total) by mouth daily at bedtime    estradiol (ESTRACE) 0.5 MG tablet Take 1 tablet (0.5 mg total) by mouth daily    haloperidol decanoate (Haldol Decanoate) 50 mg/mL injection every 30 (thirty) days    hydrocortisone 2.5 % ointment Apply topically 2 (two) times a day    levothyroxine (Euthyrox) 125 mcg tablet Take 1 tablet (125 mcg total) by mouth daily in the early morning    lidocaine (LIDODERM) 5 % Apply 1 patch topically over 12 hours daily Remove & Discard patch within 12 hours or as directed by MD    losartan (COZAAR) 50 mg tablet TAKE ONE TABLET BY MOUTH DAILY    lurasidone (LATUDA) 40 mg tablet Take 1 tablet (40 mg total) by mouth daily with breakfast    lurasidone (LATUDA) 40 mg tablet Take 1 tablet (40 mg total) by mouth daily with dinner    memantine (NAMENDA) 10 mg tablet Take 1 tablet (10 mg total) by mouth daily    metoprolol tartrate (LOPRESSOR) 25 mg tablet TAKE ONE TABLET BY MOUTH EVERY 12 HOURS    naltrexone (REVIA) 50 mg tablet Take 1 tablet (50 mg total) by mouth daily    oxybutynin (DITROPAN) 5 mg tablet Take 1 tablet (5 mg total) by mouth 2 (two) times a day    pantoprazole (PROTONIX) 20 mg tablet Take 1 tablet (20 mg total) by mouth daily in the early morning    pilocarpine (SALAGEN) 5 mg tablet Take 5 mg by mouth 2 (two) times a day    RABEprazole (ACIPHEX) 20 MG tablet Take 20 mg by mouth 2 (two) times a day    rOPINIRole (REQUIP) 1 mg tablet 1 mg    topiramate (TOPAMAX) 200 MG tablet Take 1 tablet (200 mg total) by mouth 2 (two) times a day    trospium chloride (SANCTURA) 20 mg tablet Take 20 mg by mouth 2 (two) times a day    warfarin (COUMADIN) 4 mg tablet Take 1 tablet (4 mg total) by mouth daily    [DISCONTINUED] furosemide (LASIX) 20 mg tablet Take 1 tablet (20 mg total) by mouth daily    atoMOXetine (STRATTERA) 40 mg capsule  (Patient not taking: Reported on 10/13/2023)       Objective     /70 (BP Location: Left arm, Patient Position: Sitting, Cuff Size: Adult) Pulse 65   Temp 97.8 °F (36.6 °C) (Temporal)   Ht 5' 6" (1.676 m)   Wt (!) 144 kg (318 lb)   SpO2 98%   BMI 51.33 kg/m²     Physical Exam  Vitals and nursing note reviewed. Constitutional:       Appearance: Normal appearance. She is normal weight. HENT:      Head: Normocephalic. Right Ear: Tympanic membrane, ear canal and external ear normal. There is no impacted cerumen. Left Ear: Tympanic membrane, ear canal and external ear normal. There is no impacted cerumen. Nose: Nose normal.      Mouth/Throat:      Mouth: Mucous membranes are moist.      Pharynx: Oropharynx is clear. Eyes:      Extraocular Movements: Extraocular movements intact. Conjunctiva/sclera: Conjunctivae normal.      Pupils: Pupils are equal, round, and reactive to light. Cardiovascular:      Rate and Rhythm: Normal rate and regular rhythm. Pulses: Normal pulses. Heart sounds: Normal heart sounds. Pulmonary:      Effort: Pulmonary effort is normal.      Breath sounds: Normal breath sounds. Abdominal:      General: Bowel sounds are normal. There is no distension. Palpations: Abdomen is soft. Tenderness: There is no abdominal tenderness. Musculoskeletal:         General: Normal range of motion. Cervical back: Normal range of motion. Skin:     General: Skin is warm. Capillary Refill: Capillary refill takes less than 2 seconds. Neurological:      General: No focal deficit present. Mental Status: She is alert and oriented to person, place, and time. Mental status is at baseline. Psychiatric:         Mood and Affect: Mood normal.         Behavior: Behavior normal.         Thought Content:  Thought content normal.         Judgment: Judgment normal.       Cisco Fabry, CRNP

## 2023-10-19 ENCOUNTER — PROCEDURE VISIT (OUTPATIENT)
Dept: NEUROLOGY | Facility: CLINIC | Age: 53
End: 2023-10-19

## 2023-10-19 VITALS — DIASTOLIC BLOOD PRESSURE: 75 MMHG | SYSTOLIC BLOOD PRESSURE: 138 MMHG | HEART RATE: 62 BPM | TEMPERATURE: 97.8 F

## 2023-10-19 DIAGNOSIS — G43.709 CHRONIC MIGRAINE WITHOUT AURA WITHOUT STATUS MIGRAINOSUS, NOT INTRACTABLE: Primary | ICD-10-CM

## 2023-10-19 NOTE — PROGRESS NOTES
Universal Protocol   Consent: Verbal consent obtained. Written consent obtained. Risks and benefits: risks, benefits and alternatives were discussed  Consent given by: patient  Time out: Immediately prior to procedure a "time out" was called to verify the correct patient, procedure, equipment, support staff and site/side marked as required. Patient understanding: patient states understanding of the procedure being performed  Patient consent: the patient's understanding of the procedure matches consent given  Procedure consent: procedure consent matches procedure scheduled  Relevant documents: relevant documents present and verified  Patient identity confirmed: verbally with patient      Chemodenervation     Date/Time  10/19/2023 10:00 AM     Performed by  Will Thornton PA-C   Authorized by  Will Thornton PA-C     Pre-procedure details      Prepped With: Alcohol     Anesthesia  (see MAR for exact dosages):      Anesthesia method:  None   Procedure details      Position:  Upright   Botox      Botox Type:  Type A    Brand:  Botox    mL's of Botulinum Toxin:  200    Final Concentration per CC:  50 units    Needle Gauge:  30 G 2.5 inch   Procedures      Botox Procedures: chronic headache      Indications: migraines     Injection Location      Head / Face:  L superior cervical paraspinal, R superior cervical paraspinal, L , R , L frontalis, R frontalis, L medial occipitalis, R medial occipitalis, procerus, R temporalis, L temporalis, R superior trapezius and L superior trapezius    L  injection amount:  5 unit(s)    R  injection amount:  5 unit(s)    L lateral frontalis:  5 unit(s)    R lateral frontalis:  5 unit(s)    L medial frontalis:  5 unit(s)    R medial frontalis:  5 unit(s)    L temporalis injection amount:  20 unit(s)    R temporalis injection amount:  20 unit(s)    Procerus injection amount:  5 unit(s)    L medial occipitalis injection amount:  15 unit(s)    R medial occipitalis injection amount:  15 unit(s)    L superior cervical paraspinal injection amount:  10 unit(s)    R superior cervical paraspinal injection amount:  10 unit(s)    L superior trapezius injection amount:  15 unit(s)    R superior trapezius injection amount:  15 unit(s)   Total Units      Total units used:  200    Total units discarded:  0   Post-procedure details      Chemodenervation:  Chronic migraine    Facial Nerve Location[de-identified]  Bilateral facial nerve    Patient tolerance of procedure:   Tolerated well, no immediate complications   Comments        20 units left scm  10 units occipitalis  15 units left slpenius  All medically necessary

## 2023-10-19 NOTE — TELEPHONE ENCOUNTER
Per Dr. Werner Lima teams message - I reached out to the patient since she has been trying to get in contact with a nurse to discuss tremors. I spoke to patient, she states her tremors are getting worse and she is unable to do her job, she had to leave work early in the morning on Tuesday since her tremors started on Monday during work, she will be missing tomorrow because her tremors continue the same. She states her tremors began to worsen after they increased her Depakote dosage. her psychiatrist is Dr Vivi Thomas: [phone]    Patient would appreciate if you can contact them to discuss lowering her dosage.  Please assist.

## 2023-10-20 ENCOUNTER — PATIENT OUTREACH (OUTPATIENT)
Dept: CASE MANAGEMENT | Facility: OTHER | Age: 53
End: 2023-10-20

## 2023-10-20 NOTE — PROGRESS NOTES
Outpatient Care Management Note:  Outreach call attempted to Ms. Tonya Rosas regarding BYP. Message left for patient to please return call. Contact information left on message.

## 2023-10-23 ENCOUNTER — TELEPHONE (OUTPATIENT)
Dept: HEMATOLOGY ONCOLOGY | Facility: CLINIC | Age: 53
End: 2023-10-23

## 2023-10-23 DIAGNOSIS — Z86.711 HISTORY OF PULMONARY EMBOLISM: ICD-10-CM

## 2023-10-23 RX ORDER — WARFARIN SODIUM 4 MG/1
4 TABLET ORAL
Qty: 30 TABLET | Refills: 0 | Status: SHIPPED | OUTPATIENT
Start: 2023-10-23 | End: 2023-11-22

## 2023-10-23 NOTE — TELEPHONE ENCOUNTER
Spoke with patient to let her know the refill request has been sent to [address] [place] [address] to sign and will be filled at the [facility].  Pt verbalized understanding

## 2023-10-23 NOTE — TELEPHONE ENCOUNTER
Medication Refill Request   Who are you speaking with? Patient   If it is not the patient, are they listed on an active communication consent form? N/A   Which medication is being requested for refill? Please list medication name and dosage  Warfarin (coumadin) 4mg   How many pills does the patient have left? 10   Preferred Pharmacy / Address    [address] [facility], [address] [facility]  [address] [facility]  [address] [address]      Who is the prescribing provider?  JHONATAN Lebron   Call back number  [phone]   Relevant Information Patient states this was from when she was in the hospital last and would like to see if Heather can refill it for her

## 2023-10-24 ENCOUNTER — PATIENT OUTREACH (OUTPATIENT)
Dept: CASE MANAGEMENT | Facility: OTHER | Age: 53
End: 2023-10-24

## 2023-10-24 NOTE — PROGRESS NOTES
Outpatient Care Management Note:  Outreach call placed to Ms. Tonya Pate Introduced myself and the [employer] program.  Explained that it is a voluntary program involving myself (an RN), a SW CM and CM OC as needed to assist with symptom management and social needs that may be present. Jose Antonio Shankar is interested in enrolling in the program.     Jose Antonio Shankar describes her health as "so-so."  She has had recent issues with pulmonary embolism and COVID. Jose Antonio Shankar also had a recent [place] inpatient admission. Jose Antonio Shankar verbalizes chronic lower extremity swelling for which her Lasix was recently increased. She does not weigh daily and states this is not from heart failure. Jose Antonio Shankar describes an increase in her COPD/asthma symptoms since having COVID. She has an inhaler that she uses as needed. Denies being on any scheduled medications for COPD. She is agreeable to receiving education on COPD and preventing exacerbations. Jose Antonio Shankar has MAG and uses CPAP. Per Jose Antonio Shankar, her insurance only pays for a full mask that causes panic attacks at times. Advised her I would reach out to sleep medicine to determine if they have any resources for a partial mask. She is agreeable to same' Jose Antonio Shankar quit smoking 5-6 years ago and does not feel she is at risk for starting. She does not think a referral to RT CM is needed at this time. Jose Antonio Shankar had a recent appointment with her PCP who placed a referral to weight management. Jose Antonio Shankar has not called to schedule that at this time. She knows the appointment will be several months out and her other illness's take priority at this time. She is attempting to lose weight on her own. She is agreeable to receiving information on a health diet. Jose Antonio Shankar describes herself as a picky eater. Jose Antonio Shankar sees neurology for chronic migraines as well as memory issues. She receives Botox for the migraines and is on Namenda for memory issues. Jose Antonio Shankar is active with [facility]. She sees the counselor twice weekly and the psychiatrist monthly. Richard Dodson has also started an outpatient program for her drug use. She states she has been drug free for about 2 months. Richard Dodson lives alone in a single story, fourth floor apartment. There is an elevator available. She is independent with ADL's as well as IADL's and is able to drive herself to appointments. Richard Dodson works part time three days a week. She is currently not working due to tremors from her medications but is planning on returning to work this Friday. Richard Dodson is agreeable to a follow up call in one week. She would like her welcome packet mailed to her. Message sent to [facility] regarding same. Barriers Identified:  No barrier present    Factor Assessment and Evaluation:    Initial Assessment of patient's health status. Care manager conclusion based on data collected: Richard Dodson describes her health as fair. Documentation of clinical history, including medications. Care manager conclusion based on data collected: Reviewed current medical issues as well as medications. Medications are in pill packs. [facility] assists in disposing of any discontinued medications. Initial assessment of the activities of daily living. Care manager conclusion based on data collected: iRchard Dodson is independent with her ADL's and ADL's. Initial assessment of cognitive functions. Care manager conclusion based on data collected: Richard Dodson is alert and oriented. She verbalizes issues with her memory and is active with neurology for same. Evaluation of cultural and linguistic needs, preferences or limitations. Care manager conclusion based on data collected: Richard Dodson speaks, reads and writes English. No  needed. No issues understanding her medications or instructions pertaining to her health. Evaluation of visual and hearing needs, preferences or limitations. Care manager conclusion based on data collected: Richard Dodson wears glasses and is able to see with out issue. No hearing deficits    10.  Evaluation of caregiver resources and involvement. Care manager conclusion based on data collected: Melissa Espinoza is independent in all aspects of her care. Her father is her emergency contact and provides some assistance as needed. Care Planning:   Care Plan Evaluation   Referrals or Resources-education on COPD and weight loss mailed to Melissa Espinoza. Message sent to sleep medicine regarding CPAP mask. Referral/Resource Follow-up: CM need to follow-up with patient/caregiver at a future outreach due to resources being provided today. Planning for continuity of care-will follow up in one week. Collaborative approaches-note routed to [facility] CM. Patient/Caregiver desired level of involvement-Blanca is actively involved in all aspects of her [place] care.     Barrier Analysis: No barriers identified   Follow-up Schedule: High  Communication of self-management plans to patients: Verbally reviewed with patient/caregiver

## 2023-10-25 ENCOUNTER — TELEPHONE (OUTPATIENT)
Dept: HEMATOLOGY ONCOLOGY | Facility: CLINIC | Age: 53
End: 2023-10-25

## 2023-10-25 ENCOUNTER — APPOINTMENT (OUTPATIENT)
Dept: LAB | Facility: CLINIC | Age: 53
End: 2023-10-25
Payer: MEDICARE

## 2023-10-25 NOTE — TELEPHONE ENCOUNTER
Spoke with Lay Rubio, informed her that her INR is in therapeutic range, 2.96, to continue taking the coumadin 4 mg daily. Patient will  her refills on Friday. Patient voiced understanding.

## 2023-10-27 NOTE — TELEPHONE ENCOUNTER
Patient called in was upset because according to her, Dr Shaun Gonzales was not doing her job and did not reach out to Dr Nadai Snow in regards to her medication. Patient wanted to switch providers, I let patient know that Dr Shaun Gonzales did reach out to Dr Nadia Snow in regards to medication. Patient then apologized and said that she will not change provider and that she will let the medication do it's work and see if there is any improvements within 30-90 days. She will call with status once she notices if there is any changes.

## 2023-10-29 ENCOUNTER — APPOINTMENT (EMERGENCY)
Dept: VASCULAR ULTRASOUND | Facility: HOSPITAL | Age: 53
End: 2023-10-29
Payer: MEDICARE

## 2023-10-29 ENCOUNTER — HOSPITAL ENCOUNTER (EMERGENCY)
Facility: HOSPITAL | Age: 53
Discharge: HOME | End: 2023-10-29
Attending: EMERGENCY MEDICINE
Payer: MEDICARE

## 2023-10-29 ENCOUNTER — NURSE TRIAGE (OUTPATIENT)
Dept: OTHER | Facility: OTHER | Age: 53
End: 2023-10-29

## 2023-10-29 VITALS
HEART RATE: 62 BPM | DIASTOLIC BLOOD PRESSURE: 77 MMHG | OXYGEN SATURATION: 97 % | SYSTOLIC BLOOD PRESSURE: 128 MMHG | RESPIRATION RATE: 22 BRPM | BODY MASS INDEX: 51.33 KG/M2 | WEIGHT: 293 LBS | TEMPERATURE: 98 F

## 2023-10-29 DIAGNOSIS — R60.0 BILATERAL LOWER EXTREMITY EDEMA: Primary | ICD-10-CM

## 2023-10-29 DIAGNOSIS — Z51.81 SUBTHERAPEUTIC ANTICOAGULATION: ICD-10-CM

## 2023-10-29 DIAGNOSIS — Z79.01 SUBTHERAPEUTIC ANTICOAGULATION: ICD-10-CM

## 2023-10-29 LAB
ALBUMIN SERPL BCP-MCNC: 4 G/DL (ref 3.5–5)
ALP SERPL-CCNC: 52 U/L (ref 34–104)
ALT SERPL W P-5'-P-CCNC: 11 U/L (ref 7–52)
ANION GAP SERPL CALCULATED.3IONS-SCNC: 9 MMOL/L
APTT PPP: 28 SECONDS (ref 23–37)
AST SERPL W P-5'-P-CCNC: 16 U/L (ref 13–39)
ATRIAL RATE: 62 BPM
BASOPHILS # BLD AUTO: 0.08 THOUSANDS/ÂΜL (ref 0–0.1)
BASOPHILS NFR BLD AUTO: 1 % (ref 0–1)
BILIRUB SERPL-MCNC: 0.28 MG/DL (ref 0.2–1)
BNP SERPL-MCNC: 12 PG/ML (ref 0–100)
BUN SERPL-MCNC: 16 MG/DL (ref 5–25)
CALCIUM SERPL-MCNC: 8.8 MG/DL (ref 8.4–10.2)
CARDIAC TROPONIN I PNL SERPL HS: 3 NG/L
CHLORIDE SERPL-SCNC: 107 MMOL/L (ref 96–108)
CO2 SERPL-SCNC: 23 MMOL/L (ref 21–32)
CREAT SERPL-MCNC: 0.61 MG/DL (ref 0.6–1.3)
EOSINOPHIL # BLD AUTO: 0.11 THOUSAND/ÂΜL (ref 0–0.61)
EOSINOPHIL NFR BLD AUTO: 1 % (ref 0–6)
ERYTHROCYTE [DISTWIDTH] IN BLOOD BY AUTOMATED COUNT: 14.4 % (ref 11.6–15.1)
GFR SERPL CREATININE-BSD FRML MDRD: 104 ML/MIN/1.73SQ M
GLUCOSE SERPL-MCNC: 91 MG/DL (ref 65–140)
HCT VFR BLD AUTO: 46.1 % (ref 34.8–46.1)
HGB BLD-MCNC: 14.2 G/DL (ref 11.5–15.4)
IMM GRANULOCYTES # BLD AUTO: 0.15 THOUSAND/UL (ref 0–0.2)
IMM GRANULOCYTES NFR BLD AUTO: 2 % (ref 0–2)
INR PPP: 1.75 (ref 0.84–1.19)
LYMPHOCYTES # BLD AUTO: 2.92 THOUSANDS/ÂΜL (ref 0.6–4.47)
LYMPHOCYTES NFR BLD AUTO: 35 % (ref 14–44)
MCH RBC QN AUTO: 30 PG (ref 26.8–34.3)
MCHC RBC AUTO-ENTMCNC: 30.8 G/DL (ref 31.4–37.4)
MCV RBC AUTO: 98 FL (ref 82–98)
MONOCYTES # BLD AUTO: 0.93 THOUSAND/ÂΜL (ref 0.17–1.22)
MONOCYTES NFR BLD AUTO: 11 % (ref 4–12)
NEUTROPHILS # BLD AUTO: 4.08 THOUSANDS/ÂΜL (ref 1.85–7.62)
NEUTS SEG NFR BLD AUTO: 50 % (ref 43–75)
NRBC BLD AUTO-RTO: 0 /100 WBCS
P AXIS: 50 DEGREES
PLATELET # BLD AUTO: 159 THOUSANDS/UL (ref 149–390)
PMV BLD AUTO: 10.5 FL (ref 8.9–12.7)
POTASSIUM SERPL-SCNC: 3.9 MMOL/L (ref 3.5–5.3)
PR INTERVAL: 218 MS
PROT SERPL-MCNC: 7.1 G/DL (ref 6.4–8.4)
PROTHROMBIN TIME: 21.2 SECONDS (ref 11.6–14.5)
QRS AXIS: -12 DEGREES
QRSD INTERVAL: 92 MS
QT INTERVAL: 388 MS
QTC INTERVAL: 393 MS
RBC # BLD AUTO: 4.73 MILLION/UL (ref 3.81–5.12)
SODIUM SERPL-SCNC: 139 MMOL/L (ref 135–147)
T WAVE AXIS: 65 DEGREES
VENTRICULAR RATE: 62 BPM
WBC # BLD AUTO: 8.27 THOUSAND/UL (ref 4.31–10.16)

## 2023-10-29 PROCEDURE — 99285 EMERGENCY DEPT VISIT HI MDM: CPT

## 2023-10-29 PROCEDURE — 84484 ASSAY OF TROPONIN QUANT: CPT | Performed by: EMERGENCY MEDICINE

## 2023-10-29 PROCEDURE — 93005 ELECTROCARDIOGRAM TRACING: CPT

## 2023-10-29 PROCEDURE — 93971 EXTREMITY STUDY: CPT | Performed by: SURGERY

## 2023-10-29 PROCEDURE — 85025 COMPLETE CBC W/AUTO DIFF WBC: CPT | Performed by: EMERGENCY MEDICINE

## 2023-10-29 PROCEDURE — 85730 THROMBOPLASTIN TIME PARTIAL: CPT | Performed by: EMERGENCY MEDICINE

## 2023-10-29 PROCEDURE — 83880 ASSAY OF NATRIURETIC PEPTIDE: CPT | Performed by: EMERGENCY MEDICINE

## 2023-10-29 PROCEDURE — 80053 COMPREHEN METABOLIC PANEL: CPT | Performed by: EMERGENCY MEDICINE

## 2023-10-29 PROCEDURE — 36415 COLL VENOUS BLD VENIPUNCTURE: CPT | Performed by: EMERGENCY MEDICINE

## 2023-10-29 PROCEDURE — 99285 EMERGENCY DEPT VISIT HI MDM: CPT | Performed by: EMERGENCY MEDICINE

## 2023-10-29 PROCEDURE — 93010 ELECTROCARDIOGRAM REPORT: CPT | Performed by: INTERNAL MEDICINE

## 2023-10-29 PROCEDURE — 85610 PROTHROMBIN TIME: CPT | Performed by: EMERGENCY MEDICINE

## 2023-10-29 PROCEDURE — 93971 EXTREMITY STUDY: CPT

## 2023-10-29 NOTE — DISCHARGE INSTRUCTIONS
Call your hematologist in the morning with regard to your INR being low. Keep your appointment with your primary care doctor for your leg swelling  Apply ACE bandages to assist with compression.  Elevate extremities as much as possible  Return with worsening swelling, chest pain, trouble breathing, redness, fever

## 2023-10-29 NOTE — TELEPHONE ENCOUNTER
Due to patient's symptoms and recent history of blood clot, advised patient to go to the ED. Patient verbalized understanding; advised if it is a blood clot, can be fatal if it travels to the lungs. Patient is going to see if she can find ride; otherwise advised patient to call for EMS.

## 2023-10-29 NOTE — TELEPHONE ENCOUNTER
Reason for Disposition   SEVERE leg swelling (e.g., swelling extends above knee, entire leg is swollen, weeping fluid)    Answer Assessment - Initial Assessment Questions  1. ONSET: "When did the swelling start?" (e.g., minutes, hours, days)      Patient stated that her leg swelling started a few weeks ago. Patient stated that she was put on lasix by her PCP. Patient has severe leg pain and swelling. 2. LOCATION: "What part of the leg is swollen?"  "Are both legs swollen or just one leg?"      Ankle and foot, on left side. Patient stated that it is now blistering near the ankle and is draining. Patient reports a small area by the ankle that is draining clear fluid. 3. SEVERITY: "How bad is the swelling?" (e.g., localized; mild, moderate, severe)   - Localized - small area of swelling localized to one leg   - MILD pedal edema - swelling limited to foot and ankle, pitting edema < 1/4 inch (6 mm) deep, rest and elevation eliminate most or all swelling   - MODERATE edema - swelling of lower leg to knee, pitting edema > 1/4 inch (6 mm) deep, rest and elevation only partially reduce swelling   - SEVERE edema - swelling extends above knee, facial or hand swelling present       Swelling is the entire leg up to the knee. 4. REDNESS: "Does the swelling look red or infected?"      Yes, is red towards the bottom of the foot. 5. PAIN: "Is the swelling painful to touch?" If Yes, ask: "How painful is it?"   (Scale 1-10; mild, moderate or severe)      Patient stated she has pain in the left leg, but unsure is related to the knee or leg. Patient is having severe pain. 6. FEVER: "Do you have a fever?" If Yes, ask: "What is it, how was it measured, and when did it start?"       Denies  7. CAUSE: "What do you think is causing the leg swelling?"      Patient has a history of blood clots that went to her lungs. Patient stated it was in September.    8. MEDICAL HISTORY: "Do you have a history of heart failure, kidney disease, liver failure, or cancer?"      Denies  9. RECURRENT SYMPTOM: "Have you had leg swelling before?" If Yes, ask: "When was the last time?" "What happened that time?"      Unsure  10.  OTHER SYMPTOMS: "Do you have any other symptoms?" (e.g., chest pain, difficulty breathing)        Denies    Protocols used: Leg Swelling and Edema-ADULT-AH

## 2023-10-29 NOTE — ED PROVIDER NOTES
History  Chief Complaint   Patient presents with    Leg Swelling     Patient here for eval of increased left leg pain and swelling that worsened a few days ago. Pt states they have been swollen for  months and water pill was increased. Pt states she will be getting a left knee replacement. +1 pitting edema B/L, pain and warm to the touch. Pt reports left lower leg is oozing. Unable to ambulate. Denies SOB. 69-year-old female with a history of hypertension, COPD, lower extremity edema, DVT and PE on warfarin presenting for evaluation of worsening leg swelling and left lower extremity pain. Patient was seen in the emergency department several weeks ago for bilateral lower extremity edema, and her Lasix was increased to 40 mg daily. Patient followed up with her primary care provider, who kept this dose of Lasix. However, patient states she has persistent leg swelling that is worsening in the left lower extremity and causing oozing from her mid shin. Denies trauma or injury to the area. Patient also notes persistent left lower extremity pain due to arthritis and is due to see orthopedic surgery for possible knee replacement. Patient has been taking her Coumadin as prescribed and has had therapeutic INRs. Notes a constant throbbing pain in her left lower extremity. Ambulates with a cane at baseline. Denies fever, chills, upper respiratory symptoms, chest pain, shortness of breath, nausea, vomiting, abdominal pain, dysuria. Has not been taking anything else for pain at home. Prior to Admission Medications   Prescriptions Last Dose Informant Patient Reported? Taking?    Diclofenac Sodium (VOLTAREN) 1 %  Self No No   Sig: Apply 2 g topically 4 (four) times a day   Patient not taking: Reported on 10/24/2023   RABEprazole (ACIPHEX) 20 MG tablet  Self Yes No   Sig: Take 20 mg by mouth 2 (two) times a day   albuterol (Ventolin HFA) 90 mcg/act inhaler  Self No No   Sig: Inhale 2 puffs every 6 (six) hours as needed for wheezing   amLODIPine (NORVASC) 5 mg tablet  Self No No   Sig: TAKE 1 TABLET (5 MG TOTAL) BY MOUTH DAILY   atoMOXetine (STRATTERA) 40 mg capsule  Self Yes No   Patient not taking: Reported on 10/13/2023   buPROPion (WELLBUTRIN XL) 300 mg 24 hr tablet  Self No No   Sig: Take 1 tablet (300 mg total) by mouth daily   cholecalciferol (VITAMIN D3) 1,000 units tablet  Self No No   Sig: Take 1 tablet (1,000 Units total) by mouth daily   cloNIDine (CATAPRES) 0.1 mg tablet  Self No No   Sig: Take 1 tablet (0.1 mg total) by mouth every 12 (twelve) hours   divalproex sodium (DEPAKOTE) 250 mg DR tablet  Self No No   Sig: Take 3 tablets (750 mg total) by mouth in the morning   divalproex sodium (DEPAKOTE) 500 mg DR tablet  Self No No   Sig: Take 2 tablets (1,000 mg total) by mouth daily at bedtime   estradiol (ESTRACE) 0.5 MG tablet  Self No No   Sig: Take 1 tablet (0.5 mg total) by mouth daily   furosemide (LASIX) 40 mg tablet   No No   Sig: Take 1 tablet (40 mg total) by mouth daily   haloperidol decanoate (Haldol Decanoate) 50 mg/mL injection  Self Yes No   Sig: every 30 (thirty) days   hydrocortisone 2.5 % ointment  Self No No   Sig: Apply topically 2 (two) times a day   Patient not taking: Reported on 10/24/2023   levothyroxine (Euthyrox) 125 mcg tablet  Self No No   Sig: Take 1 tablet (125 mcg total) by mouth daily in the early morning   lidocaine (LIDODERM) 5 %  Self No No   Sig: Apply 1 patch topically over 12 hours daily Remove & Discard patch within 12 hours or as directed by MD   losartan (COZAAR) 50 mg tablet  Self No No   Sig: TAKE ONE TABLET BY MOUTH DAILY   lurasidone (LATUDA) 40 mg tablet  Self No No   Sig: Take 1 tablet (40 mg total) by mouth daily with breakfast   lurasidone (LATUDA) 40 mg tablet  Self No No   Sig: Take 1 tablet (40 mg total) by mouth daily with dinner   memantine (NAMENDA) 10 mg tablet  Self No No   Sig: Take 1 tablet (10 mg total) by mouth daily   metoprolol tartrate (LOPRESSOR) 25 mg tablet   No No   Sig: TAKE ONE TABLET BY MOUTH EVERY 12 HOURS   naltrexone (REVIA) 50 mg tablet  Self No No   Sig: Take 1 tablet (50 mg total) by mouth daily   oxybutynin (DITROPAN) 5 mg tablet  Self No No   Sig: Take 1 tablet (5 mg total) by mouth 2 (two) times a day   pantoprazole (PROTONIX) 20 mg tablet  Self No No   Sig: Take 1 tablet (20 mg total) by mouth daily in the early morning   pilocarpine (SALAGEN) 5 mg tablet  Self Yes No   Sig: Take 5 mg by mouth 2 (two) times a day   rOPINIRole (REQUIP) 1 mg tablet  Self Yes No   Si mg   topiramate (TOPAMAX) 200 MG tablet  Self No No   Sig: Take 1 tablet (200 mg total) by mouth 2 (two) times a day   trospium chloride (SANCTURA) 20 mg tablet  Self Yes No   Sig: Take 20 mg by mouth 2 (two) times a day   warfarin (COUMADIN) 4 mg tablet   No No   Sig: Take 1 tablet (4 mg total) by mouth daily      Facility-Administered Medications: None       Past Medical History:   Diagnosis Date    Anxiety     Arthritis     oa cassandra knees    Asthma     good control- no medications    Yan's esophagus     Bipolar affective disorder (HCC)     Cannabis abuse with unspecified 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HCC)     Chronic pain disorder     lumbar    COPD (chronic obstructive pulmonary disease) (HCC)     CPAP (continuous positive airway pressure) dependence     Degenerative disc disease at L5-S1 level     Deliberate self-cutting     9/15/22per pt has not done recently-- does see a therapist and psychiatrist regularly    Depression 2008    Disease of thyroid gland     hypo    MARTINEZ (dyspnea on exertion)     per pt "has had this with exertion and not new"    Drug overdose 10/28/2008    suicide attempt and again drug overdose 2022-- [facility] and than transferred to [facility]    Dysphagia     Dyspnea     Edema     BLE    Family history of blood clots     GERD (gastroesophageal reflux disease)     Headache(784.0)     Headache, tension-type     Hemorrhage of rectum and anus 10/02/2017    Formatting of this note might be different from the original.  Added automatically from request for surgery [id number]    High blood pressure     History of COVID-19 12/30/2020    Symptoms started on 12/30/2020 and then got worse. Today she is feeling a little bit better. She is a candidate for monoclonal antibody infusion and set for 1/6/21 @ 1pm at [facility]. 01/07/21 - telemedicine -     Hyperlipidemia     Hypertension     Knee pain, bilateral     Especially right    Medical marijuana use     Memory loss     Migraines     Obesity     Overactive bladder     Pulmonary emboli (HCC)     Sjogren's disease ([address])     Sleep apnea     Stress incontinence     Suicidal ideations     Teeth missing     Tremor     per pt Tremors of Right Leg and both Arms    Visual impairment     Wears glasses        Past Surgical History:   Procedure Laterality Date    BREAST CYST EXCISION Right 1989    CARPAL TUNNEL RELEASE Left     CHOLECYSTECTOMY  05/2003    Laparoscopic    COLONOSCOPY      01/12/2009    DILATION AND CURETTAGE OF UTERUS      ELBOW SURGERY Left     x2.  No hardware    ESOPHAGOGASTRODUODENOSCOPY      FOOT SURGERY Left     Plantar fasciotomy    HERNIA REPAIR      HYSTERECTOMY      laporoscopic, partial    KNEE ARTHROSCOPY Bilateral     OOPHORECTOMY Left 10/2015    LA GASTROCNEMIUS RECESSION Left 02/24/2021    Procedure: RECESSION GASTROC OPEN;  Surgeon: Peter Flowers DPM;  Location: [address] [place] [address] OR;  Service: Podiatry    LA RPR UMBILICAL HRNA 5 YRS/> REDUCIBLE N/A 04/24/2019    Procedure: REPAIR HERNIA UMBILICAL LAPAROSCOPIC W/ ROBOTICS;  Surgeon: Mary Dunaway MD;  Location[facility];  Service: General    LA SPHINCTEROTOMY ANAL DIVISION SPHINCTER [facility] N/A 08/31/2018    Procedure: EUA, LEFT PARTIAL INTERNAL SPHINCTEROTOMY;  Surgeon: Sanju Cabrera MD;  Location: [address] [place] [address] OR;  Service: Colorectal    LA TNOT ELBOW LATERAL/MEDIAL DEBRIDE OPEN TDN RPR Left 09/20/2022    Procedure: RELEASE EPICONDYLAR ELBOW MEDIAL;  Surgeon: Marianna Menjivar MD;  Location: [facility] OR;  Service: Orthopedics    REDUCTION MAMMAPLASTY Bilateral 1999    REPAIR LACERATION Left     left hand-2009    REPLACEMENT TOTAL KNEE Right     ROTATOR CUFF REPAIR Left     TONSILECTOMY AND ADNOIDECTOMY      US GUIDED MSK PROCEDURE  2021    VASCULAR SURGERY      VEIN LIGATION Bilateral     WISDOM TOOTH EXTRACTION         Family History   Problem Relation Age of Onset    Kidney cancer Mother     Diabetes Mother     Depression Mother     Stroke Mother     Arthritis Mother     Cancer Mother     Psychiatric Illness Mother     No Known Problems Father     No Known Problems Sister     No Known Problems Maternal Grandmother     No Known Problems Maternal Grandfather     No Known Problems Paternal Grandmother     No Known Problems Paternal Grandfather     Colon cancer Maternal Uncle     Colon cancer Maternal Uncle     Colon cancer Paternal Uncle     Colon cancer Family     Alcohol abuse Sister     Asthma Sister      I have reviewed and agree with the history as documented. E-Cigarette/Vaping    E-Cigarette Use Current Some Day User     Comments medical THC      E-Cigarette/Vaping Substances    Nicotine No     THC Yes     CBD No     Flavoring No     Other No     Unknown No      Social History     Tobacco Use    Smoking status: Former     Packs/day: 2.00     Years: 30.00     Total pack years: 60.00     Types: Cigarettes     Start date: 1985     Quit date: 2018     Years since quittin.8    Smokeless tobacco: Never    Tobacco comments:     Started at age 14. Vaping Use    Vaping Use: Some days    Substances: THC   Substance Use Topics    Alcohol use: Not Currently     Comment: Recovering alcoholic    Drug use: Yes     Types: Marijuana, Methamphetamines     Comment: once month off meth       Review of Systems   Constitutional:  Negative for chills and fever. HENT:  Negative for congestion, rhinorrhea and sore throat.     Eyes: Negative for pain, discharge and visual disturbance. Respiratory:  Negative for cough and shortness of breath. Cardiovascular:  Positive for leg swelling. Negative for chest pain and palpitations. Gastrointestinal:  Negative for abdominal pain, diarrhea, nausea and vomiting. Genitourinary:  Negative for dysuria, frequency and hematuria. Musculoskeletal:  Positive for arthralgias. Negative for myalgias. Skin:  Positive for color change. Negative for rash. Neurological:  Negative for dizziness, weakness, light-headedness, numbness and headaches. Hematological:  Does not bruise/bleed easily. Physical Exam  Physical Exam  Vitals and nursing note reviewed. Constitutional:       General: She is not in acute distress. Appearance: Normal appearance. She is obese. HENT:      Head: Normocephalic and atraumatic. Nose: Nose normal.      Mouth/Throat:      Mouth: Mucous membranes are moist.   Eyes:      General: No scleral icterus. Right eye: No discharge. Left eye: No discharge. Conjunctiva/sclera: Conjunctivae normal.   Cardiovascular:      Rate and Rhythm: Normal rate and regular rhythm. Pulmonary:      Effort: Pulmonary effort is normal. No respiratory distress. Breath sounds: No wheezing, rhonchi or rales. Abdominal:      General: There is no distension. Palpations: Abdomen is soft. Tenderness: There is no abdominal tenderness. There is no guarding or rebound. Musculoskeletal:         General: No deformity. Cervical back: Neck supple. Right lower leg: Edema present. Left lower leg: Edema (left greater than right, pitting, mild serosanguinous drainage) present. Comments: Left lower extremity tenderness over deep venous system and posterior knee. Compartments are soft in the bilateral lower extremities. No right lower extremity tenderness palpation. Intact flexion and extension at the bilateral hips, knees, and ankles. Sensation is intact and equal.  No significant overlying skin changes; chronic venous stasis changes present bilateral lower extremities   Skin:     General: Skin is warm and dry. Capillary Refill: Capillary refill takes less than 2 seconds. Findings: No erythema or rash. Neurological:      General: No focal deficit present. Mental Status: She is alert and oriented to person, place, and time.    Psychiatric:         Mood and Affect: Mood normal.         Behavior: Behavior normal.         Vital Signs  ED Triage Vitals [10/29/23 1105]   Temperature Pulse Respirations Blood Pressure SpO2   98 °F (36.7 °C) 72 22 162/88 97 %      Temp Source Heart Rate Source Patient Position - Orthostatic VS BP Location FiO2 (%)   Oral Monitor Sitting Right arm --      Pain Score       10 - Worst Possible Pain           Vitals:    10/29/23 1105 10/29/23 1300   BP: 162/88 128/77   Pulse: 72 62   Patient Position - Orthostatic VS: Sitting Sitting         Visual Acuity      ED Medications  Medications - No data to display    Diagnostic Studies  Results Reviewed       Procedure Component Value Units Date/Time    HS Troponin 0hr (reflex protocol) [[id number]]  (Normal) Collected: 10/29/23 1201    Lab Status: Final result Specimen: Blood from Arm, Left Updated: 10/29/23 1243     hs TnI 0hr 3 ng/L     B-Type Natriuretic Peptide(BNP) [[id number]]  (Normal) Collected: 10/29/23 1201    Lab Status: Final result Specimen: Blood from Arm, Left Updated: 10/29/23 1242     BNP 12 pg/mL     Protime-INR [[id number]]  (Abnormal) Collected: 10/29/23 1201    Lab Status: Final result Specimen: Blood from Arm, Left Updated: 10/29/23 1241     Protime 21.2 seconds      INR 1.75    APTT [[id number]]  (Normal) Collected: 10/29/23 1201    Lab Status: Final result Specimen: Blood from Arm, Left Updated: 10/29/23 1241     PTT 28 seconds     Comprehensive metabolic panel [[id number]] Collected: 10/29/23 1201    Lab Status: Final result Specimen: Blood from Arm, Left Updated: 10/29/23 1236     Sodium 139 mmol/L      Potassium 3.9 mmol/L      Chloride 107 mmol/L      CO2 23 mmol/L      ANION GAP 9 mmol/L      BUN 16 mg/dL      Creatinine 0.61 mg/dL      Glucose 91 mg/dL      Calcium 8.8 mg/dL      AST 16 U/L      ALT 11 U/L      Alkaline Phosphatase 52 U/L      Total Protein 7.1 g/dL      Albumin 4.0 g/dL      Total Bilirubin 0.28 mg/dL      eGFR 104 ml/min/1.73sq m     Narrative:      National Kidney Disease Foundation guidelines for Chronic Kidney Disease (CKD):     Stage 1 with normal or high GFR (GFR > 90 mL/min/1.73 square meters)    Stage 2 Mild CKD (GFR = 60-89 mL/min/1.73 square meters)    Stage 3A Moderate CKD (GFR = 45-59 mL/min/1.73 square meters)    Stage 3B Moderate CKD (GFR = 30-44 mL/min/1.73 square meters)    Stage 4 Severe CKD (GFR = 15-29 mL/min/1.73 square meters)    Stage 5 End Stage CKD (GFR <15 mL/min/1.73 square meters)  Note: GFR calculation is accurate only with a steady state creatinine    CBC and differential [[id number]]  (Abnormal) Collected: 10/29/23 1201    Lab Status: Final result Specimen: Blood from Arm, Left Updated: 10/29/23 1223     WBC 8.27 Thousand/uL      RBC 4.73 Million/uL      Hemoglobin 14.2 g/dL      Hematocrit 46.1 %      MCV 98 fL      MCH 30.0 pg      MCHC 30.8 g/dL      RDW 14.4 %      MPV 10.5 fL      Platelets 996 Thousands/uL      nRBC 0 /100 WBCs      Neutrophils Relative 50 %      Immat GRANS % 2 %      Lymphocytes Relative 35 %      Monocytes Relative 11 %      Eosinophils Relative 1 %      Basophils Relative 1 %      Neutrophils Absolute 4.08 Thousands/µL      Immature Grans Absolute 0.15 Thousand/uL      Lymphocytes Absolute 2.92 Thousands/µL      Monocytes Absolute 0.93 Thousand/µL      Eosinophils Absolute 0.11 Thousand/µL      Basophils Absolute 0.08 Thousands/µL                    VAS lower limb venous duplex study, unilateral/limited    (Results Pending)              Procedures  ECG 12 Lead Documentation Only    Date/Time: 10/29/2023 12:30 PM    Performed by: Areli Bran MD  Authorized by: Areli Bran MD    Indications / Diagnosis:  Eval for ischemia and dysrhythmia  ECG reviewed by me, the ED Provider: yes    Patient location:  ED  Previous ECG:     Previous ECG:  Compared to current    Comparison ECG info:  10/15/2023    Similarity:  No change  Interpretation:     Interpretation: normal    Rate:     ECG rate:  62    ECG rate assessment: normal    Rhythm:     Rhythm: sinus rhythm    Ectopy:     Ectopy: none    QRS:     QRS axis:  Normal    QRS intervals:  Normal  Conduction:     Conduction: abnormal      Abnormal conduction: 1st degree    ST segments:     ST segments:  Normal  T waves:     T waves: normal             ED Course  ED Course as of 10/29/23 1553   Sun Oct 29, 2023   1234 Per vascular tech, LE venous doppler negative for DVT   1234 CBC and differential(!)  Grossly normal   1236 Comprehensive metabolic panel  Grossly normal   1251 hs TnI 0hr: 3   1252 PTT: 28   1252 BNP: 12   1252 POCT INR(!): 1.75                               SBIRT 20yo+      Flowsheet Row Most Recent Value   Initial Alcohol Screen: US AUDIT-C     1. How often do you have a drink containing alcohol? 0 Filed at: 10/29/2023 1117   2. How many drinks containing alcohol do you have on a typical day you are drinking? 0 Filed at: 10/29/2023 1117   3b. FEMALE Any Age, or MALE 65+: How often do you have 4 or more drinks on one occassion? 0 Filed at: 10/29/2023 1117   Audit-C Score 0 Filed at: 10/29/2023 1117   OLEG: How many times in the past year have you. .. Used an illegal drug or used a prescription medication for non-medical reasons? Never Filed at: 10/29/2023 1117                      Medical Decision Making  46-year-old female presenting for evaluation of worsening lower extremity edema and left lower extremity pain.   Differential diagnosis includes dysrhythmia, coronary ischemia, electrolyte derangement, heart failure, renal failure, liver failure, dependent edema, DVT. Patient has significant bilateral extremity edema but no evidence of overlying skin infection, cellulitis, or abscess. EKG shows normal sinus rhythm without evidence of ischemia or malignant dysrhythmia. CBC and CMP are grossly normal.  Initial troponin is 3; do not suspect ACS at this time. BNP is normal.  INR is subtherapeutic at 1.75. Lower extremity venous duplex obtained to evaluate for DVT; no evidence of acute DVT in the left lower extremity. No evidence of compartment syndrome on exam.  Suspect worsening dependent edema. Patient's left lower extremity pain may also be exacerbated by chronic arthritis. Offered admission to the patient versus discharge home with close outpatient follow-up. Patient states she has follow-up with primary care provider tomorrow. Feel this is appropriate for discharge home with follow-up tomorrow with discussion of titration of her Lasix. Ace bandages applied to her lower extremities, and patient advised to continue compression and elevation at home. Patient advised to call her hematologist tomorrow with regard to her subtherapeutic INR. Return precautions thoroughly discussed. Patient is in agreement and understanding of these instructions. Amount and/or Complexity of Data Reviewed  Labs: ordered. Decision-making details documented in ED Course.              Disposition  Final diagnoses:   Bilateral lower extremity edema   Subtherapeutic anticoagulation     Time reflects when diagnosis was documented in both MDM as applicable and the Disposition within this note       Time User Action Codes Description Comment    10/29/2023  1:27 PM Milka Henson Add [R60.0] Bilateral lower extremity edema     10/29/2023  1:28 PM Milka Henson Add [Z51.81,  Z79.01] Subtherapeutic anticoagulation           ED Disposition       ED Disposition   Discharge    Condition   Stable    Date/Time   Sun Oct 29, 2023 [facility] discharge to home/self care.                    Follow-up Information       Follow up With Specialties Details Why [address], [address] [facility] Nurse Practitioner On 10/30/2023  [address] [address] [address]  [phone]              Discharge Medication List as of 10/29/2023  1:29 PM        CONTINUE these medications which have NOT CHANGED    Details   albuterol (Ventolin HFA) 90 mcg/act inhaler Inhale 2 puffs every 6 (six) hours as needed for wheezing, Starting Fri 9/15/2023, Normal      amLODIPine (NORVASC) 5 mg tablet TAKE 1 TABLET (5 MG TOTAL) BY MOUTH DAILY, Starting Wed 7/12/2023, Normal      atoMOXetine (STRATTERA) 40 mg capsule Historical Med      buPROPion (WELLBUTRIN XL) 300 mg 24 hr tablet Take 1 tablet (300 mg total) by mouth daily, Starting Fri 10/6/2023, Until Sun 11/5/2023, Normal      cholecalciferol (VITAMIN D3) 1,000 units tablet Take 1 tablet (1,000 Units total) by mouth daily, Starting Fri 10/6/2023, Until Sun 11/5/2023, Normal      cloNIDine (CATAPRES) 0.1 mg tablet Take 1 tablet (0.1 mg total) by mouth every 12 (twelve) hours, Starting Fri 10/6/2023, Until Sun 11/5/2023, Normal      Diclofenac Sodium (VOLTAREN) 1 % Apply 2 g topically 4 (four) times a day, Starting Mon 7/17/2023, Normal      !! divalproex sodium (DEPAKOTE) 250 mg DR tablet Take 3 tablets (750 mg total) by mouth in the morning, Starting Fri 10/6/2023, Until Sun 11/5/2023, Normal      !! divalproex sodium (DEPAKOTE) 500 mg DR tablet Take 2 tablets (1,000 mg total) by mouth daily at bedtime, Starting Fri 10/6/2023, Until Sun 11/5/2023, Normal      estradiol (ESTRACE) 0.5 MG tablet Take 1 tablet (0.5 mg total) by mouth daily, Starting Thu 5/4/2023, Normal      furosemide (LASIX) 40 mg tablet Take 1 tablet (40 mg total) by mouth daily, Starting Wed 10/18/2023, Normal      haloperidol decanoate (Haldol Decanoate) 50 mg/mL injection every 30 (thirty) days, Starting Wed 8/3/2022, Historical Med      hydrocortisone 2.5 % ointment Apply topically 2 (two) times a day, Starting Tue 10/25/2022, Normal      levothyroxine (Euthyrox) 125 mcg tablet Take 1 tablet (125 mcg total) by mouth daily in the early morning, Starting Fri 10/6/2023, Until Sun 11/5/2023, Normal      lidocaine (LIDODERM) 5 % Apply 1 patch topically over 12 hours daily Remove & Discard patch within 12 hours or as directed by MD, Starting Fri 10/6/2023, No Print      losartan (COZAAR) 50 mg tablet TAKE ONE TABLET BY MOUTH DAILY, Normal      !! lurasidone (LATUDA) 40 mg tablet Take 1 tablet (40 mg total) by mouth daily with breakfast, Starting Fri 10/6/2023, Until Sun 11/5/2023, Normal      !! lurasidone (LATUDA) 40 mg tablet Take 1 tablet (40 mg total) by mouth daily with dinner, Starting Fri 10/6/2023, Until Sun 11/5/2023, Normal      memantine (NAMENDA) 10 mg tablet Take 1 tablet (10 mg total) by mouth daily, Starting Fri 10/6/2023, Until Sun 11/5/2023, Normal      metoprolol tartrate (LOPRESSOR) 25 mg tablet TAKE ONE TABLET BY MOUTH EVERY 12 HOURS, Normal      naltrexone (REVIA) 50 mg tablet Take 1 tablet (50 mg total) by mouth daily, Starting Fri 10/6/2023, Until Sun 11/5/2023, Normal      oxybutynin (DITROPAN) 5 mg tablet Take 1 tablet (5 mg total) by mouth 2 (two) times a day, Starting Fri 10/6/2023, Until Sun 11/5/2023, Normal      pantoprazole (PROTONIX) 20 mg tablet Take 1 tablet (20 mg total) by mouth daily in the early morning, Starting Fri 10/6/2023, Until Sun 11/5/2023, Normal      pilocarpine (SALAGEN) 5 mg tablet Take 5 mg by mouth 2 (two) times a day, Historical Med      RABEprazole (ACIPHEX) 20 MG tablet Take 20 mg by mouth 2 (two) times a day, Historical Med      rOPINIRole (REQUIP) 1 mg tablet 1 mg, Starting Fri 10/13/2023, Historical Med      topiramate (TOPAMAX) 200 MG tablet Take 1 tablet (200 mg total) by mouth 2 (two) times a day, Starting Fri 10/6/2023, Until Sun 11/5/2023, Normal      trospium chloride (SANCTURA) 20 mg tablet Take 20 mg by mouth 2 (two) times a day, Starting Thu 1/12/2023, Until Fri 1/12/2024, Historical Med      warfarin (COUMADIN) 4 mg tablet Take 1 tablet (4 mg total) by mouth daily, Starting Mon 10/23/2023, Until Wed 11/22/2023, Normal       !! - Potential duplicate medications found. Please discuss with provider. No discharge procedures on file.     PDMP Review         Value Time User    PDMP Reviewed  Yes 9/29/2023  8:09 AM Penny Nichols MD            ED Provider  Electronically Signed by             Wiley Montgomery MD  10/29/23 0161

## 2023-10-30 ENCOUNTER — OFFICE VISIT (OUTPATIENT)
Dept: FAMILY MEDICINE CLINIC | Facility: CLINIC | Age: 53
End: 2023-10-30
Payer: MEDICARE

## 2023-10-30 ENCOUNTER — PATIENT OUTREACH (OUTPATIENT)
Dept: CASE MANAGEMENT | Facility: OTHER | Age: 53
End: 2023-10-30

## 2023-10-30 ENCOUNTER — TELEPHONE (OUTPATIENT)
Dept: HEMATOLOGY ONCOLOGY | Facility: CLINIC | Age: 53
End: 2023-10-30

## 2023-10-30 VITALS
BODY MASS INDEX: 50.13 KG/M2 | HEART RATE: 68 BPM | OXYGEN SATURATION: 99 % | SYSTOLIC BLOOD PRESSURE: 106 MMHG | DIASTOLIC BLOOD PRESSURE: 72 MMHG | WEIGHT: 293 LBS | TEMPERATURE: 97.1 F

## 2023-10-30 DIAGNOSIS — Z86.711 HISTORY OF PULMONARY EMBOLISM: Primary | ICD-10-CM

## 2023-10-30 DIAGNOSIS — I26.92 ACUTE SADDLE PULMONARY EMBOLISM, UNSPECIFIED WHETHER ACUTE COR PULMONALE PRESENT (HCC): ICD-10-CM

## 2023-10-30 DIAGNOSIS — I89.0 LYMPHEDEMA: Primary | ICD-10-CM

## 2023-10-30 PROCEDURE — 99213 OFFICE O/P EST LOW 20 MIN: CPT

## 2023-10-30 RX ORDER — WARFARIN SODIUM 2 MG/1
TABLET ORAL
Qty: 30 TABLET | Refills: 2 | Status: SHIPPED | OUTPATIENT
Start: 2023-10-30

## 2023-10-30 NOTE — PROGRESS NOTES
Name: Soledad Morales      : 1970      MRN: [id number]  Encounter Provider: JHONATAN Mcdowell  Encounter Date: 10/30/2023   Encounter department: [address] [place] [address] [place]     1. Lymphedema  Assessment & Plan:  Worsening bilateral lower extremity edema   (+) 2 edema present   Now scant amount of drainage present with open areas   Taking Furosemide 40mg daily, this was previously increased. Referral to lymphedema clinic     Orders:  -     Ambulatory Referral to PT/OT Lymphedema Therapy; Future           Subjective      Presents today for worsening bilateral leg swelling. This has been a recurring issue but has worsened over the past 3 days. Presented to the emergency room yesterday and work up was negative and discharged home. Small area of oozing coming from the left lateral leg with 2 open areas. The drainage is clear. Patient is on 40mg of Lasix daily. Review of Systems   Constitutional:  Negative for activity change, chills, fatigue and fever. HENT:  Negative for congestion, ear pain, rhinorrhea, sore throat and trouble swallowing. Eyes:  Negative for pain and visual disturbance. Respiratory:  Negative for cough, chest tightness and shortness of breath. Cardiovascular:  Negative for chest pain, palpitations and leg swelling. Gastrointestinal:  Negative for abdominal pain, constipation, diarrhea, nausea and vomiting. Genitourinary:  Negative for difficulty urinating, dysuria, hematuria and urgency. Musculoskeletal:  Positive for joint swelling. Negative for arthralgias and back pain. Skin:  Positive for wound. Negative for color change and rash. Neurological:  Negative for dizziness, seizures, syncope and headaches. Psychiatric/Behavioral:  Negative for dysphoric mood. The patient is not nervous/anxious. All other systems reviewed and are negative.       Current Outpatient Medications on File Prior to Visit   Medication Sig albuterol (Ventolin HFA) 90 mcg/act inhaler Inhale 2 puffs every 6 (six) hours as needed for wheezing    amLODIPine (NORVASC) 5 mg tablet TAKE 1 TABLET (5 MG TOTAL) BY MOUTH DAILY    buPROPion (WELLBUTRIN XL) 300 mg 24 hr tablet Take 1 tablet (300 mg total) by mouth daily    cholecalciferol (VITAMIN D3) 1,000 units tablet Take 1 tablet (1,000 Units total) by mouth daily    cloNIDine (CATAPRES) 0.1 mg tablet Take 1 tablet (0.1 mg total) by mouth every 12 (twelve) hours    divalproex sodium (DEPAKOTE) 250 mg DR tablet Take 3 tablets (750 mg total) by mouth in the morning    divalproex sodium (DEPAKOTE) 500 mg DR tablet Take 2 tablets (1,000 mg total) by mouth daily at bedtime    estradiol (ESTRACE) 0.5 MG tablet Take 1 tablet (0.5 mg total) by mouth daily    furosemide (LASIX) 40 mg tablet Take 1 tablet (40 mg total) by mouth daily    haloperidol decanoate (Haldol Decanoate) 50 mg/mL injection every 30 (thirty) days    levothyroxine (Euthyrox) 125 mcg tablet Take 1 tablet (125 mcg total) by mouth daily in the early morning    lidocaine (LIDODERM) 5 % Apply 1 patch topically over 12 hours daily Remove & Discard patch within 12 hours or as directed by MD    losartan (COZAAR) 50 mg tablet TAKE ONE TABLET BY MOUTH DAILY    lurasidone (LATUDA) 40 mg tablet Take 1 tablet (40 mg total) by mouth daily with breakfast    lurasidone (LATUDA) 40 mg tablet Take 1 tablet (40 mg total) by mouth daily with dinner    memantine (NAMENDA) 10 mg tablet Take 1 tablet (10 mg total) by mouth daily    metoprolol tartrate (LOPRESSOR) 25 mg tablet TAKE ONE TABLET BY MOUTH EVERY 12 HOURS    naltrexone (REVIA) 50 mg tablet Take 1 tablet (50 mg total) by mouth daily    oxybutynin (DITROPAN) 5 mg tablet Take 1 tablet (5 mg total) by mouth 2 (two) times a day    pantoprazole (PROTONIX) 20 mg tablet Take 1 tablet (20 mg total) by mouth daily in the early morning    pilocarpine (SALAGEN) 5 mg tablet Take 5 mg by mouth 2 (two) times a day RABEprazole (ACIPHEX) 20 MG tablet Take 20 mg by mouth 2 (two) times a day    rOPINIRole (REQUIP) 1 mg tablet 1 mg    topiramate (TOPAMAX) 200 MG tablet Take 1 tablet (200 mg total) by mouth 2 (two) times a day    trospium chloride (SANCTURA) 20 mg tablet Take 20 mg by mouth 2 (two) times a day    warfarin (COUMADIN) 4 mg tablet Take 1 tablet (4 mg total) by mouth daily    atoMOXetine (STRATTERA) 40 mg capsule  (Patient not taking: Reported on 10/13/2023)    Diclofenac Sodium (VOLTAREN) 1 % Apply 2 g topically 4 (four) times a day (Patient not taking: Reported on 10/24/2023)    hydrocortisone 2.5 % ointment Apply topically 2 (two) times a day (Patient not taking: Reported on 10/24/2023)       Objective     /72 (BP Location: Left arm, Patient Position: Sitting, Cuff Size: Large)   Pulse 68   Temp (!) 97.1 °F (36.2 °C)   Wt (!) 141 kg (310 lb 9.6 oz)   SpO2 99%   BMI 50.13 kg/m²     Physical Exam  Vitals and nursing note reviewed. Constitutional:       Appearance: Normal appearance. She is normal weight. HENT:      Head: Normocephalic. Right Ear: Tympanic membrane, ear canal and external ear normal. There is no impacted cerumen. Left Ear: Tympanic membrane, ear canal and external ear normal. There is no impacted cerumen. Nose: Nose normal.      Mouth/Throat:      Mouth: Mucous membranes are moist.      Pharynx: Oropharynx is clear. Eyes:      Extraocular Movements: Extraocular movements intact. Conjunctiva/sclera: Conjunctivae normal.      Pupils: Pupils are equal, round, and reactive to light. Cardiovascular:      Rate and Rhythm: Normal rate and regular rhythm. Pulses: Normal pulses. Heart sounds: Normal heart sounds. Pulmonary:      Effort: Pulmonary effort is normal.      Breath sounds: Normal breath sounds. Abdominal:      General: Bowel sounds are normal. There is no distension. Palpations: Abdomen is soft. Tenderness:  There is no abdominal tenderness. Musculoskeletal:         General: Normal range of motion. Cervical back: Normal range of motion. Right lower le+ Pitting Edema present. Left lower le+ Pitting Edema present. Legs:       Comments: Small open areas, scant amount of serosanguinous drainage coming from area   Skin:     General: Skin is warm. Capillary Refill: Capillary refill takes less than 2 seconds. Neurological:      General: No focal deficit present. Mental Status: She is alert and oriented to person, place, and time. Mental status is at baseline. Psychiatric:         Mood and Affect: Mood normal.         Behavior: Behavior normal.         Thought Content:  Thought content normal.         Judgment: Judgment normal.       JHONATAN Orona

## 2023-10-30 NOTE — PATIENT INSTRUCTIONS
Lymphedema   WHAT YOU NEED TO KNOW:   There is no cure for lymphedema. Treatment can relieve symptoms and prevent lymphedema from getting worse. DISCHARGE INSTRUCTIONS:   Contact your healthcare provider or lymphedema specialist if:   You have a fever or chills. You have an open area of skin that looks red or swollen, or drains pus. Your symptoms, such as swelling or pain, get worse. Your arms or legs feel heavy, or you cannot move them. Your skin becomes hard, thick, or rough. You have a skin wound that will not heal.     Your shoes, clothes, or jewelry feel tighter. You have questions or concerns about your condition or care. Self-care:   Elevate  your arm or leg above the level of your heart as often as you can. This will help decrease swelling and pain. Prop your arm or leg on pillows or blankets to keep it elevated comfortably. Wear compression socks, sleeves, or bandages  as directed. Compression devices must be fitted by a healthcare provider. Compression devices may need to be replaced every 3 to 6 months. Exercise  can help you maintain or regain function of your arm or leg. Ask your healthcare provider what type of exercise to do and how often to do it. Start slow, take breaks, and gradually do more each day. Do not do vigorous, repeated exercises. Watch for changes in your arm or leg during exercise. Stop and rest if you have swelling, increased pain, or heaviness. Elevate your arm or leg above the level of your heart. Change your position often  to help move lymphatic fluid through your body. Do not sit or  one position for more than 30 minutes. Do not cross your legs when you sit. These actions can cause lymphatic fluid to buildup. Maintain a healthy weight. Ask your healthcare provider what you should weigh. Weight loss may improve your symptoms.  If you need to lose weight, your healthcare provider can help you create a weight loss program.    Prevent infection with proper skin care:  A skin infection can make lymphedema worse. Do the following to decrease your risk for a skin infection in your arm or leg with lymphedema:  Wash your skin gently and dry it well. Use a mild soap to wash your skin. Gently pat your skin dry after you bathe. Apply a mild cream or lotion to moisturize your skin and prevent dryness or cracking. Keep your feet clean and dry. Protect your skin from injury. Wear gloves when you garden and wash dishes. Cut your nails straight across to prevent injury to your fingers and toes. Use sunscreen and insect repellant to avoid burns and punctures. Wear slippers in the house. Wear shoes when you go outside. Check your skin every day for signs of infection. Signs of infection include redness, swelling, increased heat, or pus. You may also have a fever or chills. Care for cuts, scratches or burns. Apply antibiotic ointment to cuts and other small breaks in the skin. Apply a cold pack or cold water to a burn for 15 minutes. Then wash it with soap and water. Cover scratches, cuts, or burns with a clean, dry gauze or bandage as directed. Keep the area clean and dry. Tell healthcare providers that you have lymphedema. Tell them not to do, IVs, blood draws, and blood pressure readings in the arm or leg with lymphedema. If there is lymphedema in both arms, ask them to take your blood pressure on your leg. Do not allow flu shots or vaccinations in your arm with lymphedema. Follow up with your healthcare provider or lymphedema specialist as directed: You will need regular visits so healthcare providers can examine the affected areas. You may also be referred to a clinic that specializes in lymphedema treatment. Write down your questions so you remember to ask them during your visits.   © Copyright [employer] 2023 Information is for End User's use only and may not be sold, redistributed or otherwise used for commercial purposes. The above information is an  only. It is not intended as medical advice for individual conditions or treatments. Talk to your doctor, nurse or pharmacist before following any medical regimen to see if it is safe and effective for you.

## 2023-10-30 NOTE — LETTER
October 30, 2023     Patient: Aly Curtis  YOB: 1970  Date of Visit: 10/30/2023      To Whom it May Concern:    Godwin Td is under my professional care. Chau was seen in my office on 10/30/2023. Chau may return to work on 10/30/23 . If you have any questions or concerns, please don't hesitate to call.          Sincerely,          JHONATAN Michael        CC: No Recipients

## 2023-10-30 NOTE — TELEPHONE ENCOUNTER
Spoke with Barrett Nogueira, reviewed her INR yesterday was 1.75. She reports she has not missed a dose. Informed her presenting 2 mg tablets to her pharmacy. Patient is actually out and said she is near her pharmacy so she will pick it up today. Asked her to take 6 mg total today until Thursday, 11-23. She will get a PT/INR on Thursday and I will call her with the results. Patient voiced understanding.

## 2023-10-30 NOTE — PROGRESS NOTES
Outpatient Care Management Note:  In basket ER alert received. Chart review completed. Currently has appointment with PCP this morning. Outreach call scheduled.

## 2023-10-30 NOTE — ASSESSMENT & PLAN NOTE
Worsening bilateral lower extremity edema   (+) 2 edema present   Now scant amount of drainage present with open areas   Taking Furosemide 40mg daily, this was previously increased.    Referral to lymphedema clinic

## 2023-10-30 NOTE — PROGRESS NOTES
Outpatient Care Management Note:  Call attempted to Jame Vargas after recent ER visit. Message left for patient to please return call. Contact information left on message. PCP note from this morning reviewed as well as message from hematology regarding Coumadin. Transitions:   Type: Unplanned  Provider notification within 2 days of discharge  PCP: Heidi ISRAEL  Notified via: ADT alert  Specialty Provider: Dr. Jocelin Alamo  Notified via: ADT alert  Other Care Team Member: RAIN HERNANDEZ, Hematology  Notified via: ADT alert  Collaboration with discharge team: reviewed HP IP/OP [facility]  Communication of changes to care plans to patient/caregiver:  provided AVS on ER discharge as well as discharge from PCP office. Will review with Jame Vargas when able. Message left this call.

## 2023-10-30 NOTE — TELEPHONE ENCOUNTER
Patient Call    Who are you speaking with? Patient    If it is not the patient, are they listed on an active communication consent form? Yes   What is the reason for this call? INR = 2 to inform ms. Ramirez   Does this require a call back? No   If a call back is required, please list best call back number [phone]    If a call back is required, advise that a message will be forwarded to their care team and someone will return their call as soon as possible. Did you relay this information to the patient?  Yes

## 2023-10-31 ENCOUNTER — PATIENT OUTREACH (OUTPATIENT)
Dept: CASE MANAGEMENT | Facility: HOSPITAL | Age: 53
End: 2023-10-31

## 2023-10-31 NOTE — PROGRESS NOTES
RAIN HERNANDEZ outreached pt and introduced self as RAIN CM for BYP. Pt was agreeable to outreach and completing assessment today. Pt shared that she has Medicare and [place] and her insurance is covering her healthcare needs. Pt stated that she is not having difficulty affording any of her other needs at this time. Pt drives herself places. Pt stated that her knee is not affecting her ability to drive, but she does have difficulty getting in and out of the car. There is elevator in her apartment building. Pt uses a cane. Stated she is having a very difficult time getting around d/t needing a knee replacement. Stated that she works 3 days a week at [employer] in [place]. She sits most of the time at work. She stated her job is pretty flexible with her. She does not need a caregiver at this time. Pt stated she has completed [employer]. She does not want any information on other types of ACP documentation at this time. Pt has no Presybeterian or cultural considerations. Pt stated she has bipolar d/o, anxiety and depression. She is working with [facility] but is "having a hard time" with the program. Pt stated they will not talk to her now because she filed a complaint. She is dealing with [employer] now. Pt became upset and stated that the agency will not tell her when her appointments are. RAIN HERNANDEZ offered to do three way call with pt to  [facility] and pt declined stating "they will not talk". RAIN HERNANDEZ encouraged pt to call the agency to discuss a BAY for RAIN HERNANDEZ and provided name and contact information. Pt stated she will think about doing this. Pt was agreeable to another call next week to discuss care planning d/t pt being upset today. RAIN HERNANDEZ will remain available for any other needs. Note routed to RN CM. Barriers Identified:  Potential behavioral health or cognitive impairment problems impact care    Factor Assessment and Evaluation:    Initial assessment of social determinants of health.    Care manager conclusion based on data collected: Pt does not need assistance with financial resources at this time. She denies having informal social supports, but does call the Peer Line for support if she needs it. Pt does not need a caregiver at this time. Initial assessment of health beliefs and behaviors. Care manager conclusion based on data collected: Pt denied having any cultural or Mandaen preferences. Initial assessment of life-planning activities. Care manager conclusion based on data collected: Pt stated she has completed the [employer] document. RAIN HERNANDEZ explained that she may bring that document to her provider's office to have it scanned into her chart. Pt stated she will do this. She is not interested in information on other types of ACP documents at this time. Evaluation of available benefits within the organization. Care manager conclusion based on data collected: Pt does not have any financial needs at this time. She expressed understanding that OP CM team may be able to assist with resources if she does need any in the future. Evaluation of community resources. Care manager conclusion based on data collected: Pt has an ACT team with  [facility]. She shared that she is not sure where things stand with the program at this time, as she and the program are communicating with [facility] about a complaint that pt filed. Pt was encouraged to call  [facility] to provide RAIN 's information to have a BAY on file. Pt stated she will think about doing this. She declined RAIN HERNANDEZ's offer to complete a three way call to discuss this today. RAIN HERNANDEZ and pt will discuss if she has interest in finding alternative [facility] providers during next call.

## 2023-11-01 ENCOUNTER — PATIENT OUTREACH (OUTPATIENT)
Dept: CASE MANAGEMENT | Facility: OTHER | Age: 53
End: 2023-11-01

## 2023-11-01 NOTE — PROGRESS NOTES
Outpatient Care Management Note:  Call placed to Melissa Espinoza after recent ER visit, she has also had an appointment with her PCP since that visit. Per Melissa Espinoza, the swelling is improved and her leg is no longer draining. She is not applying the compression wraps due to the improvement. Encouraged her to keep her legs wrapped since the swelling has been occurring intermittently. Discussed importance of elevating her legs when sitting as well as not crossing her legs. Encouraged her to increase her activity such as walking. Per Melissa Espinoza, she does stand at her job but only works a few hours three times a week. She is also attending therapy sessions at [facility] for her plantar fasciitis. Discussed referral for PT/OT/lymphedema clinic. She is waiting for them to call her about scheduling. Reviewed medications. Due to a low INR, Blanca's dose of coumadin was increased to 6 mg daily. She is aware she is to have lab work tomorrow. Denies any signs of bleeding. Denies any other needs at this time. Care Planning:   Care Plan Evaluation   Referrals or Resources no new referrals at this time. Referral/Resource Follow-up: CM need to follow-up with patient/caregiver at a future outreach due to educational information not received yet. Planning for continuity of care-will follow up in approximately two weeks. Collaborative approaches-note routed to RAIN CM. Patient/Caregiver desired level of involvement-Blanca remains involved in her care.     Barrier Analysis: No barriers identified   Follow-up Schedule: Medium  Communication of self-management plans to patients: Verbally reviewed with patient/caregiver

## 2023-11-02 ENCOUNTER — HOSPITAL ENCOUNTER (EMERGENCY)
Facility: HOSPITAL | Age: 53
Discharge: HOME | End: 2023-11-02
Attending: EMERGENCY MEDICINE
Payer: MEDICARE

## 2023-11-02 ENCOUNTER — PATIENT OUTREACH (OUTPATIENT)
Dept: CASE MANAGEMENT | Facility: HOSPITAL | Age: 53
End: 2023-11-02

## 2023-11-02 ENCOUNTER — TELEPHONE (OUTPATIENT)
Dept: HEMATOLOGY ONCOLOGY | Facility: CLINIC | Age: 53
End: 2023-11-02

## 2023-11-02 VITALS
OXYGEN SATURATION: 96 % | RESPIRATION RATE: 18 BRPM | SYSTOLIC BLOOD PRESSURE: 145 MMHG | HEART RATE: 81 BPM | TEMPERATURE: 98.1 F | DIASTOLIC BLOOD PRESSURE: 70 MMHG

## 2023-11-02 DIAGNOSIS — R45.851 SUICIDAL IDEATION: Primary | ICD-10-CM

## 2023-11-02 LAB
ETHANOL EXG-MCNC: 0 MG/DL
INR PPP: 2.89 (ref 0.84–1.19)
PROTHROMBIN TIME: 29.7 SECONDS (ref 11.6–14.5)

## 2023-11-02 PROCEDURE — 82075 ASSAY OF BREATH ETHANOL: CPT | Performed by: EMERGENCY MEDICINE

## 2023-11-02 PROCEDURE — 36415 COLL VENOUS BLD VENIPUNCTURE: CPT | Performed by: EMERGENCY MEDICINE

## 2023-11-02 PROCEDURE — 99284 EMERGENCY DEPT VISIT MOD MDM: CPT | Performed by: EMERGENCY MEDICINE

## 2023-11-02 PROCEDURE — 85610 PROTHROMBIN TIME: CPT | Performed by: EMERGENCY MEDICINE

## 2023-11-02 PROCEDURE — 99283 EMERGENCY DEPT VISIT LOW MDM: CPT

## 2023-11-02 NOTE — PROGRESS NOTES
RAIN DAVID received ADT alert that pt was seen at ED d/t suicidal ideation. Pt was seen by crisis and [facility] and d/c home with OP f/u. PCP was not included on ADT alert that RAIN HERNANDEZ received so an IB was sent to PCP. Hospital risk assessment was completed within the last 30 days. Call placed to pt to f/u. Pt stated she is feeling okay. She stated that she is waiting to hear from [facility] about when her appointments are scheduled for. Pt confirmed she has information on crisis line. Pt denied having any needs for RAIN HERNANDEZ to assist with at this time and expressed understanding that she may call should she need assistance. RAIN HERNANDEZ will outreach pt next week. Note routed to RN CM. Chart review completed for the following sections:  Recent Vital Signs  Allergies/Contradictions  Medication Review   History   [facility]   Problem List  Immunizations  Past hospitalizations and major procedures, including surgery  Significant past illnesses and treatment history including: History and Physical and Other provider notes  Relevant past medications related to the patient's condition        Transitions:   Type: Unplanned  Provider notification within 2 days of discharge  PCP: JHONATAN Orona  Notified via: Felicitas  Specialty Provider: [facility], [facility]  Notified via: N/A Provider from [facility] called 911 so pt could be transported to ED. BALDO KATHLEEN was present at ED with pt for evaluation.   Other Care Team Member: N/A  Notified via: N/A  Collaboration with discharge team: telephonic  Communication of changes to care plans to patient/caregiver: verbally reviewed with patient/caregiver

## 2023-11-02 NOTE — DISCHARGE INSTRUCTIONS
Your INR was 2.89. Call your hematologist to let them know this was drawn in the ER. Come back for increasing suicidal thoughts or if you no longer feel safe and are at risk to harm yourself. This writer discussed the patient's current presentation and recommended discharge plan with Dr. Dennise Mosqueda. They agree with the patient being discharged at this time to follow up outpatient with established providers to include [facility] and [facility]. Resource lists for partial hospitalization programs provided. Advised to utilize natural and existing supports. Advised for safety planning and effective coping skills. Advised to return to ED if you begin having thoughts of suicide. [place] Crisis Number: Adele Kirkland [phone]. National Suicide/Crisis Lifeline: [employer]  Crisis Text Line: Text Connect to 071768. [employer] - Wellness twin that connects you to a mental health professional.     This writer discussed discharge plans with the patient and [facility], who agrees with and understands the discharge plans. SAFETY PLAN  Warning Signs (thoughts, images, mood, behavior, situations) of a potential crisis:  Urges or thoughts to harm self      Coping Skills (what can I do to take my mind off the problem, or to keep myself safe: Playing Guarnic. Working at [employer] [facility] [employer]. Music. Go for a walk. Adequate rest.  Deep breathing techniques. Outside Support (who can I reach out to for support and help:  Dad, [facility], [facility].

## 2023-11-02 NOTE — ED NOTES
Crisis intervention specialist (CIS) met with the patient with [facility] worker at bedside, to complete intake / safety risk assessments. Patient arrived by EMS after her outpatient substance use therapist at [facility] called 911 for suicidal ideations. On assessment the patient was fully alert and oriented, withdrawn, mild restlessness, squeezing a stress ball, polite, cooperative. Patient has a mental health and substance use history. Is connected with [facility] and [facility]. Patient reports that she was discharged from [facility] at the beginning of October. Patient states she was admitted to [facility] after she intentionally overdosed. Today the patient became upset about some communication with her ACT team, "felt slighted". Patient was at home and speaking with her therapist when she disclosed having thoughts of hurting herself (no plan). Patient denies any current suicidal ideation; intent or plan. Patient denies any intent or plan. Patient does have a positive history of suicide attempts as referenced above. Patient denies any self-harm, homicidal ideations or psychosis. Patient endorses chronic depression and anhedonia. ACT worker reports the patient does appear to be much calmer than before. Patient believes this is secondary to her newly prescribed medication, Strattera. Normal sleep and appetite. She does have chronic medical issues which she is compliant with taking all medications. 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 Patient denies any ETOH use. Patient reports medication compliance. She has no access to weapons including firearms. 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 Patient contracts for safety and does not feel she needs inpatient,  [facility] is in agreement. [facility] will continue to monitor the patient and can provide transportation home upon discharge. Completed safety plan with the patient and [facility]. See separate note for the plan.

## 2023-11-02 NOTE — ED NOTES
ACT nurse and [address] at bedside      Katia Phelan, [address]  11/02/23 2281

## 2023-11-02 NOTE — ED PROVIDER NOTES
History  Chief Complaint   Patient presents with    Psychiatric Evaluation     Per EMS, Hang called EMS for "thoughts"  was seen at [address] [place] [address] Saturday for Edema. Pt wanted to refuse transport to hospital today. Pt called ACT team and states they are on their way here. Pt in recovery x2 months. Hx meth use. Brett Spencer Previous OD in Sept with Flexeril per ems     48 y/o female hx of obesity , MAG, COPD, PE on warfarin,  HTN, hypothyroidism, Sjogren's syndrome, overactive bladder, GERD, chronic LBP, DDD, migraine, and PPH of Schizoaffective disorder, Borderline Personality Disorder, polysubstance abuse, HTN, HLD, drug overdose. Patient presents to the emergency department for suicidal ideation without plan. Denies having a plan or attempting to kill herself recently. Denies homicidal ideation. Denies hallucinations. Denies any physical complaints today. States that she is at her normal state health today. She has an ACT team which are on the way. She states that she has been taking 6 mg of warfarin each morning recently as her INR was low. She is due for an INR check today per patient        Suicidal  Presenting symptoms: suicidal thoughts    Patient accompanied by: act team.  Onset quality:  Sudden      Prior to Admission Medications   Prescriptions Last Dose Informant Patient Reported? Taking?    Diclofenac Sodium (VOLTAREN) 1 %  Self No No   Sig: Apply 2 g topically 4 (four) times a day   Patient not taking: Reported on 10/24/2023   RABEprazole (ACIPHEX) 20 MG tablet  Self Yes No   Sig: Take 20 mg by mouth 2 (two) times a day   albuterol (Ventolin HFA) 90 mcg/act inhaler  Self No No   Sig: Inhale 2 puffs every 6 (six) hours as needed for wheezing   Patient not taking: Reported on 11/1/2023   amLODIPine (NORVASC) 5 mg tablet  Self No No   Sig: TAKE 1 TABLET (5 MG TOTAL) BY MOUTH DAILY   atoMOXetine (STRATTERA) 40 mg capsule  Self Yes No   Patient not taking: Reported on 10/13/2023   buPROPion [facility] XL) 300 mg 24 hr tablet  Self No No   Sig: Take 1 tablet (300 mg total) by mouth daily   cholecalciferol (VITAMIN D3) 1,000 units tablet  Self No No   Sig: Take 1 tablet (1,000 Units total) by mouth daily   cloNIDine (CATAPRES) 0.1 mg tablet  Self No No   Sig: Take 1 tablet (0.1 mg total) by mouth every 12 (twelve) hours   divalproex sodium (DEPAKOTE) 250 mg DR tablet  Self No No   Sig: Take 3 tablets (750 mg total) by mouth in the morning   divalproex sodium (DEPAKOTE) 500 mg DR tablet  Self No No   Sig: Take 2 tablets (1,000 mg total) by mouth daily at bedtime   estradiol (ESTRACE) 0.5 MG tablet  Self No No   Sig: Take 1 tablet (0.5 mg total) by mouth daily   furosemide (LASIX) 40 mg tablet   No No   Sig: Take 1 tablet (40 mg total) by mouth daily   haloperidol decanoate (Haldol Decanoate) 50 mg/mL injection  Self Yes No   Sig: every 30 (thirty) days   hydrocortisone 2.5 % ointment  Self No No   Sig: Apply topically 2 (two) times a day   Patient not taking: Reported on 10/24/2023   levothyroxine (Euthyrox) 125 mcg tablet  Self No No   Sig: Take 1 tablet (125 mcg total) by mouth daily in the early morning   lidocaine (LIDODERM) 5 %  Self No No   Sig: Apply 1 patch topically over 12 hours daily Remove & Discard patch within 12 hours or as directed by MD   losartan (COZAAR) 50 mg tablet  Self No No   Sig: TAKE ONE TABLET BY MOUTH DAILY   lurasidone (LATUDA) 40 mg tablet  Self No No   Sig: Take 1 tablet (40 mg total) by mouth daily with breakfast   lurasidone (LATUDA) 40 mg tablet  Self No No   Sig: Take 1 tablet (40 mg total) by mouth daily with dinner   memantine (NAMENDA) 10 mg tablet  Self No No   Sig: Take 1 tablet (10 mg total) by mouth daily   metoprolol tartrate (LOPRESSOR) 25 mg tablet   No No   Sig: TAKE ONE TABLET BY MOUTH EVERY 12 HOURS   naltrexone (REVIA) 50 mg tablet  Self No No   Sig: Take 1 tablet (50 mg total) by mouth daily   oxybutynin (DITROPAN) 5 mg tablet  Self No No   Sig: Take 1 tablet (5 mg total) by mouth 2 (two) times a day   pantoprazole (PROTONIX) 20 mg tablet  Self No No   Sig: Take 1 tablet (20 mg total) by mouth daily in the early morning   pilocarpine (SALAGEN) 5 mg tablet  Self Yes No   Sig: Take 5 mg by mouth 2 (two) times a day   rOPINIRole (REQUIP) 1 mg tablet  Self Yes No   Si mg   topiramate (TOPAMAX) 200 MG tablet  Self No No   Sig: Take 1 tablet (200 mg total) by mouth 2 (two) times a day   trospium chloride (SANCTURA) 20 mg tablet  Self Yes No   Sig: Take 20 mg by mouth 2 (two) times a day   warfarin (COUMADIN) 4 mg tablet   No No   Sig: Take 1 tablet (4 mg total) by mouth daily   warfarin (Coumadin) 2 mg tablet   No No   Sig: Take as directed      Facility-Administered Medications: None       Past Medical History:   Diagnosis Date    Anxiety     Arthritis     oa cassandra knees    Asthma     good control- no medications    Yan's esophagus     Bipolar affective disorder (HCC)     Cannabis abuse with unspecified 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HCC)     Chronic pain disorder     lumbar    COPD (chronic obstructive pulmonary disease) (HCC)     CPAP (continuous positive airway pressure) dependence     Degenerative disc disease at L5-S1 level     Deliberate self-cutting     9/15/22per pt has not done recently-- does see a therapist and psychiatrist regularly    Depression 2008    Disease of thyroid gland     hypo    MARTINEZ (dyspnea on exertion)     per pt "has had this with exertion and not new"    Drug overdose 10/28/2008    suicide attempt and again drug overdose 2022-- [facility] floor and than transferred to [facility]    Dysphagia     Dyspnea     Edema     BLE    Family history of blood clots     GERD (gastroesophageal reflux disease)     Headache(784.0)     Headache, tension-type     Hemorrhage of rectum and anus 10/02/2017    Formatting of this note might be different from the original.  Added automatically from request for surgery [id number]    High blood pressure     History of COVID-19 12/30/2020    Symptoms started on 12/30/2020 and then got worse. Today she is feeling a little bit better. She is a candidate for monoclonal antibody infusion and set for 1/6/21 @ 1pm at [facility]. 01/07/21 - telemedicine -     Hyperlipidemia     Hypertension     Knee pain, bilateral     Especially right    Medical marijuana use     Memory loss     Migraines     Obesity     Overactive bladder     Pulmonary emboli (HCC)     Sjogren's disease ([address])     Sleep apnea     Stress incontinence     Suicidal ideations     Teeth missing     Tremor     per pt Tremors of Right Leg and both Arms    Visual impairment     Wears glasses        Past Surgical History:   Procedure Laterality Date    BREAST CYST EXCISION Right 1989    CARPAL TUNNEL RELEASE Left     CHOLECYSTECTOMY  05/2003    Laparoscopic    COLONOSCOPY      01/12/2009    DILATION AND CURETTAGE OF UTERUS      ELBOW SURGERY Left     x2.  No hardware    ESOPHAGOGASTRODUODENOSCOPY      FOOT SURGERY Left     Plantar fasciotomy    HERNIA REPAIR      HYSTERECTOMY      laporoscopic, partial    KNEE ARTHROSCOPY Bilateral     OOPHORECTOMY Left 10/2015    VT GASTROCNEMIUS RECESSION Left 02/24/2021    Procedure: RECESSION GASTROC OPEN;  Surgeon: Duke Miller DPM;  Location: [address] [place] [address] MAIN OR;  Service: Podiatry    VT RPR UMBILICAL HRNA 5 YRS/> REDUCIBLE N/A 04/24/2019    Procedure: REPAIR HERNIA UMBILICAL LAPAROSCOPIC W/ ROBOTICS;  Surgeon: Chad Varela MD;  Location: [facility] Main OR;  Service: General    VT SPHINCTEROTOMY ANAL DIVISION SPHINCTER [facility] ER N/A 08/31/2018    Procedure: EUA, LEFT PARTIAL INTERNAL SPHINCTEROTOMY;  Surgeon: Kimi Pederson MD;  Location: [address] [place] [address] MAIN OR;  Service: Colorectal    VT TNOT ELBOW LATERAL/MEDIAL DEBRIDE OPEN TDN RPR Left 09/20/2022    Procedure: RELEASE EPICONDYLAR ELBOW MEDIAL;  Surgeon: Vinay Torres MD;  Location: [facility] MAIN OR;  Service: Orthopedics    REDUCTION MAMMAPLASTY Bilateral 1999    REPAIR LACERATION Left     left hand-2009    REPLACEMENT TOTAL KNEE Right     ROTATOR CUFF REPAIR Left     TONSILECTOMY AND ADNOIDECTOMY      US GUIDED MSK PROCEDURE  2021    VASCULAR SURGERY      VEIN LIGATION Bilateral     WISDOM TOOTH EXTRACTION         Family History   Problem Relation Age of Onset    Kidney cancer Mother     Diabetes Mother     Depression Mother     Stroke Mother     Arthritis Mother     Cancer Mother     Psychiatric Illness Mother     No Known Problems Father     No Known Problems Sister     No Known Problems Maternal Grandmother     No Known Problems Maternal Grandfather     No Known Problems Paternal Grandmother     No Known Problems Paternal Grandfather     Colon cancer Maternal Uncle     Colon cancer Maternal Uncle     Colon cancer Paternal Uncle     Colon cancer Family     Alcohol abuse Sister     Asthma Sister      I have reviewed and agree with the history as documented. E-Cigarette/Vaping    E-Cigarette Use Current Some Day User     Comments medical THC      E-Cigarette/Vaping Substances    Nicotine No     THC Yes     CBD No     Flavoring No     Other No     Unknown No      Social History     Tobacco Use    Smoking status: Former     Packs/day: 2.00     Years: 30.00     Total pack years: 60.00     Types: Cigarettes     Start date: 1985     Quit date: 2018     Years since quittin.8    Smokeless tobacco: Never    Tobacco comments:     Started at age 14. Vaping Use    Vaping Use: Some days    Substances: THC   Substance Use Topics    Alcohol use: Not Currently     Comment: Recovering alcoholic    Drug use: Yes     Types: Marijuana, Methamphetamines     Comment: once month off meth       Review of Systems   Psychiatric/Behavioral:  Positive for suicidal ideas. All other systems reviewed and are negative. Physical Exam  Physical Exam  Vitals and nursing note reviewed. Constitutional:       General: She is not in acute distress. Appearance: Normal appearance. She is obese.  She is not ill-appearing. HENT:      Head: Normocephalic and atraumatic. Right Ear: External ear normal.      Left Ear: External ear normal.      Nose: Nose normal.      Mouth/Throat:      Mouth: Mucous membranes are moist.   Eyes:      General:         Right eye: No discharge. Left eye: No discharge. Conjunctiva/sclera: Conjunctivae normal.   Cardiovascular:      Rate and Rhythm: Normal rate and regular rhythm. Pulses: Normal pulses. Heart sounds: No murmur heard. Pulmonary:      Effort: Pulmonary effort is normal.      Breath sounds: Normal breath sounds. Abdominal:      General: Abdomen is flat. There is no distension. Tenderness: There is no abdominal tenderness. Musculoskeletal:         General: Normal range of motion. Cervical back: Normal range of motion. Skin:     General: Skin is warm. Capillary Refill: Capillary refill takes less than 2 seconds. Findings: No rash. Neurological:      General: No focal deficit present. Mental Status: She is alert. Mental status is at baseline. Psychiatric:      Comments: Admits SI. Denies HI.   Denies hallucinations         Vital Signs  ED Triage Vitals [11/02/23 0940]   Temperature Pulse Respirations Blood Pressure SpO2   98.1 °F (36.7 °C) 81 18 145/70 96 %      Temp Source Heart Rate Source Patient Position - Orthostatic VS BP Location FiO2 (%)   Oral Monitor Sitting Right arm --      Pain Score       --           Vitals:    11/02/23 0940   BP: 145/70   Pulse: 81   Patient Position - Orthostatic VS: Sitting         Visual Acuity      ED Medications  Medications - No data to display    Diagnostic Studies  Results Reviewed       Procedure Component Value Units Date/Time    Protime-INR [[id number]]  (Abnormal) Collected: 11/02/23 1002    Lab Status: Final result Specimen: Blood from Arm, Right Updated: 11/02/23 1027     Protime 29.7 seconds      INR 2.89    POCT alcohol breath test [[id number]]  (Normal) Resulted: 11/02/23 1008    Lab Status: Final result Updated: 11/02/23 1008     EXTBreath Alcohol 0.000                   No orders to display              Procedures  Procedures         ED Course                               SBIRT 22yo+      Flowsheet Row Most Recent Value   Initial Alcohol Screen: US AUDIT-C     1. How often do you have a drink containing alcohol? 0 Filed at: 11/02/2023 0943   2. How many drinks containing alcohol do you have on a typical day you are drinking? 0 Filed at: 11/02/2023 0943   3a. Male UNDER 65: How often do you have five or more drinks on one occasion? 0 Filed at: 11/02/2023 0943   3b. FEMALE Any Age, or MALE 65+: How often do you have 4 or more drinks on one occassion? 0 Filed at: 11/02/2023 0943   Audit-C Score 0 Filed at: 11/02/2023 9908   OLEG: How many times in the past year have you. .. Used an illegal drug or used a prescription medication for non-medical reasons? Never Filed at: 11/02/2023 0236                      Medical Decision Making  Patient with suicidal ideation. She contracts for safety. She has no plan. She is declining a 201. ACT team arrived. They also feel the patient is safe for discharge. She has multiple resources outpatient. She asked that I check her INR as she is due today. INR is 2.97. Recommended that she call her hematologist with the value today. We will discharge home at patient's request.  Return precautions discussed. Problems Addressed:  Suicidal ideation: acute illness or injury    Amount and/or Complexity of Data Reviewed  Labs: ordered.              Disposition  Final diagnoses:   Suicidal ideation     Time reflects when diagnosis was documented in both MDM as applicable and the Disposition within this note       Time User Action Codes Description Comment    11/2/2023 10:48 AM Navid Palma Add [L25.997] Suicidal ideation           ED Disposition       ED Disposition   Discharge    Condition   Stable    Date/Time   Thu Nov 2, 2023 [facility] discharge to home/self care.                    Follow-up Information       Follow up With Specialties Details Why Contact Info Additional Information    JHONATAN Chopra Nurse Practitioner  For re-evaluation as soon as possible [address] [place] [address] [phone]       [address] [facility] Emergency Medicine  If symptoms worsen [address] [phone] [address] Emergency Department, [facility] [address], [address] [facility]            Discharge Medication List as of 11/2/2023 10:56 AM        CONTINUE these medications which have NOT CHANGED    Details   albuterol (Ventolin HFA) 90 mcg/act inhaler Inhale 2 puffs every 6 (six) hours as needed for wheezing, Starting Fri 9/15/2023, Normal      amLODIPine (NORVASC) 5 mg tablet TAKE 1 TABLET (5 MG TOTAL) BY MOUTH DAILY, Starting Wed 7/12/2023, Normal      atoMOXetine (STRATTERA) 40 mg capsule Historical Med      buPROPion (WELLBUTRIN XL) 300 mg 24 hr tablet Take 1 tablet (300 mg total) by mouth daily, Starting Fri 10/6/2023, Until Sun 11/5/2023, Normal      cholecalciferol (VITAMIN D3) 1,000 units tablet Take 1 tablet (1,000 Units total) by mouth daily, Starting Fri 10/6/2023, Until Sun 11/5/2023, Normal      cloNIDine (CATAPRES) 0.1 mg tablet Take 1 tablet (0.1 mg total) by mouth every 12 (twelve) hours, Starting Fri 10/6/2023, Until Sun 11/5/2023, Normal      Diclofenac Sodium (VOLTAREN) 1 % Apply 2 g topically 4 (four) times a day, Starting Mon 7/17/2023, Normal      !! divalproex sodium (DEPAKOTE) 250 mg DR tablet Take 3 tablets (750 mg total) by mouth in the morning, Starting Fri 10/6/2023, Until Sun 11/5/2023, Normal      !! divalproex sodium (DEPAKOTE) 500 mg DR tablet Take 2 tablets (1,000 mg total) by mouth daily at bedtime, Starting Fri 10/6/2023, Until Sun 11/5/2023, Normal      estradiol (ESTRACE) 0.5 MG tablet Take 1 tablet (0.5 mg total) by mouth daily, Starting Thu 5/4/2023, Normal      furosemide (LASIX) 40 mg tablet Take 1 tablet (40 mg total) by mouth daily, Starting Wed 10/18/2023, Normal      haloperidol decanoate (Haldol Decanoate) 50 mg/mL injection every 30 (thirty) days, Starting Wed 8/3/2022, Historical Med      hydrocortisone 2.5 % ointment Apply topically 2 (two) times a day, Starting Tue 10/25/2022, Normal      levothyroxine (Euthyrox) 125 mcg tablet Take 1 tablet (125 mcg total) by mouth daily in the early morning, Starting Fri 10/6/2023, Until Sun 11/5/2023, Normal      lidocaine (LIDODERM) 5 % Apply 1 patch topically over 12 hours daily Remove & Discard patch within 12 hours or as directed by MD, Starting Fri 10/6/2023, No Print      losartan (COZAAR) 50 mg tablet TAKE ONE TABLET BY MOUTH DAILY, Normal      !! lurasidone (LATUDA) 40 mg tablet Take 1 tablet (40 mg total) by mouth daily with breakfast, Starting Fri 10/6/2023, Until Sun 11/5/2023, Normal      !! lurasidone (LATUDA) 40 mg tablet Take 1 tablet (40 mg total) by mouth daily with dinner, Starting Fri 10/6/2023, Until Sun 11/5/2023, Normal      memantine (NAMENDA) 10 mg tablet Take 1 tablet (10 mg total) by mouth daily, Starting Fri 10/6/2023, Until Sun 11/5/2023, Normal      metoprolol tartrate (LOPRESSOR) 25 mg tablet TAKE ONE TABLET BY MOUTH EVERY 12 HOURS, Normal      naltrexone (REVIA) 50 mg tablet Take 1 tablet (50 mg total) by mouth daily, Starting Fri 10/6/2023, Until Sun 11/5/2023, Normal      oxybutynin (DITROPAN) 5 mg tablet Take 1 tablet (5 mg total) by mouth 2 (two) times a day, Starting Fri 10/6/2023, Until Sun 11/5/2023, Normal      pantoprazole (PROTONIX) 20 mg tablet Take 1 tablet (20 mg total) by mouth daily in the early morning, Starting Fri 10/6/2023, Until Sun 11/5/2023, Normal      pilocarpine (SALAGEN) 5 mg tablet Take 5 mg by mouth 2 (two) times a day, Historical Med      RABEprazole (ACIPHEX) 20 MG tablet Take 20 mg by mouth 2 (two) times a day, Historical Med      rOPINIRole (REQUIP) 1 mg tablet 1 mg, Starting Fri 10/13/2023, Historical Med      topiramate (TOPAMAX) 200 MG tablet Take 1 tablet (200 mg total) by mouth 2 (two) times a day, Starting Fri 10/6/2023, Until Sun 11/5/2023, Normal      trospium chloride (SANCTURA) 20 mg tablet Take 20 mg by mouth 2 (two) times a day, Starting Thu 1/12/2023, Until Fri 1/12/2024, Historical Med      !! warfarin (Coumadin) 2 mg tablet Take as directed, Normal      !! warfarin (COUMADIN) 4 mg tablet Take 1 tablet (4 mg total) by mouth daily, Starting Mon 10/23/2023, Until Wed 11/22/2023, Normal       !! - Potential duplicate medications found. Please discuss with provider. No discharge procedures on file.     PDMP Review         Value Time User    PDMP Reviewed  Yes 9/29/2023  8:09 AM Johny Brunner, MD            ED Provider  Electronically Signed by             Chema Thao DO  11/02/23 5848

## 2023-11-02 NOTE — ED NOTES
This writer discussed the patient's current presentation and recommended discharge plan with Dr. Melody Penny. They agree with the patient being discharged at this time to follow up outpatient with established providers to include [facility] and [facility]. Resources for partial hospitalization programs provided. Advised to utilize natural and existing supports. Advised for safety planning and effective coping skills. Advised to return to ED if you begin having thoughts of suicide. [place] Crisis Number: James 729-986-8375. National Suicide/Crisis Lifeline: [employer]  Crisis Text Line: Text Connect to 398899. [employer] - Wellness twin that connects you to a mental health professional.    This writer discussed discharge plans with the patient and [facility], who agrees with and understands the discharge plans. SAFETY PLAN  Warning Signs (thoughts, images, mood, behavior, situations) of a potential crisis:  Urges or thoughts to harm self      Coping Skills (what can I do to take my mind off the problem, or to keep myself safe: Playing RR[employer]. Working at [address]. Music. Go for a walk. Adequate rest.  Deep breathing techniques. Outside Support (who can I reach out to for support and help:  Dad, [facility], [facility].

## 2023-11-02 NOTE — TELEPHONE ENCOUNTER
Spoke with patient to let her know [facility]  has reviewed her INR and would like her to continue on 6mg daily and have INR rechecked on . Patient verbalized understanding and will continue on that dose. Pt aware to call back with any questions or concerns.

## 2023-11-02 NOTE — TELEPHONE ENCOUNTER
Patient Call    Who are you speaking with? Patient    If it is not the patient, are they listed on an active communication consent form? N/A   What is the reason for this call? She asked what dose of warfarin she should be taking   Does this require a call back? Yes   If a call back is required, please list best call back number [phone]   If a call back is required, advise that a message will be forwarded to their care team and someone will return their call as soon as possible. Did you relay this information to the patient?  Yes

## 2023-11-03 ENCOUNTER — PATIENT OUTREACH (OUTPATIENT)
Dept: CASE MANAGEMENT | Facility: OTHER | Age: 53
End: 2023-11-03

## 2023-11-03 ENCOUNTER — OFFICE VISIT (OUTPATIENT)
Dept: FAMILY MEDICINE CLINIC | Facility: CLINIC | Age: 53
End: 2023-11-03
Payer: MEDICARE

## 2023-11-03 VITALS
HEART RATE: 75 BPM | WEIGHT: 293 LBS | DIASTOLIC BLOOD PRESSURE: 80 MMHG | TEMPERATURE: 97.3 F | BODY MASS INDEX: 49.39 KG/M2 | RESPIRATION RATE: 18 BRPM | SYSTOLIC BLOOD PRESSURE: 130 MMHG | OXYGEN SATURATION: 99 %

## 2023-11-03 DIAGNOSIS — B37.2 CANDIDA INFECTION OF FLEXURAL SKIN: Primary | ICD-10-CM

## 2023-11-03 PROCEDURE — 99214 OFFICE O/P EST MOD 30 MIN: CPT | Performed by: FAMILY MEDICINE

## 2023-11-03 RX ORDER — NYSTATIN 100000 [USP'U]/G
POWDER TOPICAL 4 TIMES DAILY
Qty: 60 G | Refills: 1 | Status: SHIPPED | OUTPATIENT
Start: 2023-11-03 | End: 2023-11-08

## 2023-11-03 NOTE — LETTER
November 3, 2023     Patient: Andre Drake  YOB: 1970  Date of Visit: 11/3/2023      To Whom it May Concern:    Lashawn Pelletier is under my professional care. Alyse Gordillo was seen in my office on 11/3/2023. Alyse Gordillo will be late to work today due to seeing me in clinic. If you have any questions or concerns, please don't hesitate to call.          Sincerely,          Raúl Moore DO        CC: No Recipients 151.8

## 2023-11-03 NOTE — ASSESSMENT & PLAN NOTE
Beefy red appearing rash, itchy and burning sensation x1 week.  Has occurred in the past, resolved with time + hydrocortisone cream  - appears to be fungal  - nystatin powder ordered QID  - f/u PRN

## 2023-11-03 NOTE — PROGRESS NOTES
Outpatient Care Management Note:  Call placed to [facility] after recent ER visit to review medications and check for any needs. [facility] states that they did not change any of her BH medications. She is also to remain on 6 mg of Coumadin and have repeat lab work done on 11/8/2023. No signs of bleeding noticed. [facility] has returned to work but had to leave early today die to her ongoing knee pain. She is scheduled to see orthopedics next week. At [facility]. Per [facility], her employer suggested she may need to take medical leave if she is unable to work her schedule. Supportive listening provided. She was seen by her PCP for a rash on her neck and prescribed Nystatin for same. Discussed keeping the area clean and dry as much as possible. [facility] states this has happened in the past as well. [facility] states she does have an appointment with  [facility] on 34/1/3666 but she is uncertain if she needs to go to their office or if they are coming to her home. Encouraged to call and confirm. [facility] did receive the education that was sent to her but has not had a chance to review it at this time. Care Planning:   Care Plan Evaluation   Referrals or Resources-no new referral or resources. Referral/Resource Follow-up: Patient/Caregiver started the process to complete referral/resource and the following is pending -education was received but she has not reviewed it. Planning for continuity of care-follow up call in two weeks. Collaborative approaches-note routed to RAIN HERNANDEZ. Patient/Caregiver desired level of involvement-Blanca remains actively involved in her care. Barrier Analysis: No barriers identified   Follow-up Schedule: Medium  Communication of self-management plans to patients: Verbally reviewed with patient/caregiver    Welcome Packet not received. Irina SINCLAIR Made aware of same and will mail new one.

## 2023-11-03 NOTE — PROGRESS NOTES
Name: Hipolito Rodas      : 1970      MRN: [id number]  Encounter Provider: Alisa Johnson DO  Encounter Date: 11/3/2023   Encounter department: [address] [place] [address] [place]     1. Candida infection of flexural skin  Assessment & Plan:  Beefy red appearing rash, itchy and burning sensation x1 week. Has occurred in the past, resolved with time + hydrocortisone cream  - appears to be fungal  - nystatin powder ordered QID  - f/u PRN    Orders:  -     nystatin (MYCOSTATIN) powder; Apply topically 4 (four) times a day for 5 days           Subjective      Recurrent neck rash x1 week    Rash  This is a recurrent problem. The current episode started in the past 7 days. The problem is unchanged. The affected locations include the neck. The problem is moderate. The rash is characterized by blistering, burning, itchiness, dryness and redness. She was exposed to nothing. The rash first occurred at home. Pertinent negatives include no cough, fever, shortness of breath, sore throat or vomiting. Past treatments include topical steroids. The treatment provided mild relief. Her past medical history is significant for allergies and eczema. There were no sick contacts. Review of Systems   Constitutional:  Negative for chills and fever. HENT:  Negative for ear pain and sore throat. Eyes:  Negative for pain and visual disturbance. Respiratory:  Negative for cough and shortness of breath. Cardiovascular:  Negative for chest pain and palpitations. Gastrointestinal:  Negative for abdominal pain and vomiting. Genitourinary:  Negative for dysuria and hematuria. Musculoskeletal:  Negative for arthralgias and back pain. Skin:  Positive for rash. Negative for color change. Neurological:  Negative for seizures and syncope. All other systems reviewed and are negative.       Current Outpatient Medications on File Prior to Visit   Medication Sig   • amLODIPine (NORVASC) 5 mg tablet TAKE 1 TABLET (5 MG TOTAL) BY MOUTH DAILY   • atoMOXetine (STRATTERA) 40 mg capsule    • buPROPion (WELLBUTRIN XL) 300 mg 24 hr tablet Take 1 tablet (300 mg total) by mouth daily   • cholecalciferol (VITAMIN D3) 1,000 units tablet Take 1 tablet (1,000 Units total) by mouth daily   • cloNIDine (CATAPRES) 0.1 mg tablet Take 1 tablet (0.1 mg total) by mouth every 12 (twelve) hours   • divalproex sodium (DEPAKOTE) 250 mg DR tablet Take 3 tablets (750 mg total) by mouth in the morning   • divalproex sodium (DEPAKOTE) 500 mg DR tablet Take 2 tablets (1,000 mg total) by mouth daily at bedtime   • estradiol (ESTRACE) 0.5 MG tablet Take 1 tablet (0.5 mg total) by mouth daily   • furosemide (LASIX) 40 mg tablet Take 1 tablet (40 mg total) by mouth daily   • haloperidol decanoate (Haldol Decanoate) 50 mg/mL injection every 30 (thirty) days   • levothyroxine (Euthyrox) 125 mcg tablet Take 1 tablet (125 mcg total) by mouth daily in the early morning   • lidocaine (LIDODERM) 5 % Apply 1 patch topically over 12 hours daily Remove & Discard patch within 12 hours or as directed by MD   • losartan (COZAAR) 50 mg tablet TAKE ONE TABLET BY MOUTH DAILY   • lurasidone (LATUDA) 40 mg tablet Take 1 tablet (40 mg total) by mouth daily with breakfast   • lurasidone (LATUDA) 40 mg tablet Take 1 tablet (40 mg total) by mouth daily with dinner   • memantine (NAMENDA) 10 mg tablet Take 1 tablet (10 mg total) by mouth daily   • metoprolol tartrate (LOPRESSOR) 25 mg tablet TAKE ONE TABLET BY MOUTH EVERY 12 HOURS   • naltrexone (REVIA) 50 mg tablet Take 1 tablet (50 mg total) by mouth daily   • oxybutynin (DITROPAN) 5 mg tablet Take 1 tablet (5 mg total) by mouth 2 (two) times a day   • pantoprazole (PROTONIX) 20 mg tablet Take 1 tablet (20 mg total) by mouth daily in the early morning   • pilocarpine (SALAGEN) 5 mg tablet Take 5 mg by mouth 2 (two) times a day   • RABEprazole (ACIPHEX) 20 MG tablet Take 20 mg by mouth 2 (two) times a day   • rOPINIRole (REQUIP) 1 mg tablet 1 mg   • topiramate (TOPAMAX) 200 MG tablet Take 1 tablet (200 mg total) by mouth 2 (two) times a day   • trospium chloride (SANCTURA) 20 mg tablet Take 20 mg by mouth 2 (two) times a day   • warfarin (Coumadin) 2 mg tablet Take as directed   • warfarin (COUMADIN) 4 mg tablet Take 1 tablet (4 mg total) by mouth daily   • albuterol (Ventolin HFA) 90 mcg/act inhaler Inhale 2 puffs every 6 (six) hours as needed for wheezing (Patient not taking: Reported on 11/1/2023)   • Diclofenac Sodium (VOLTAREN) 1 % Apply 2 g topically 4 (four) times a day (Patient not taking: Reported on 10/24/2023)   • hydrocortisone 2.5 % ointment Apply topically 2 (two) times a day (Patient not taking: Reported on 10/24/2023)       Objective     /80 (BP Location: Left arm, Patient Position: Sitting, Cuff Size: Large)   Pulse 75   Temp (!) 97.3 °F (36.3 °C) (Temporal)   Resp 18   Wt (!) 139 kg (306 lb)   SpO2 99%   BMI 49.39 kg/m²     Physical Exam  Vitals reviewed. Constitutional:       General: She is not in acute distress. Appearance: Normal appearance. She is obese. She is not ill-appearing or diaphoretic. HENT:      Head: Normocephalic and atraumatic. Nose: Nose normal. No congestion or rhinorrhea. Mouth/Throat:      Mouth: Mucous membranes are moist.      Pharynx: Oropharynx is clear. No oropharyngeal exudate. Eyes:      General: No scleral icterus. Conjunctiva/sclera: Conjunctivae normal.      Pupils: Pupils are equal, round, and reactive to light. Cardiovascular:      Rate and Rhythm: Normal rate and regular rhythm. Pulses: Normal pulses. Heart sounds: No murmur heard. Pulmonary:      Effort: Pulmonary effort is normal.      Breath sounds: Normal breath sounds. No wheezing. Chest:      Chest wall: No tenderness. Abdominal:      General: Bowel sounds are normal.      Palpations: Abdomen is soft. There is no mass. Tenderness:  There is no abdominal tenderness. There is no right CVA tenderness or left CVA tenderness. Musculoskeletal:         General: Normal range of motion. Cervical back: Normal range of motion. Skin:     General: Skin is warm. Capillary Refill: Capillary refill takes less than 2 seconds. Findings: Erythema and rash present. No bruising. Comments: Neck rash, circumscribed, beefy red appearance, moist appearing   Neurological:      Mental Status: She is alert and oriented to person, place, and time.    Psychiatric:         Mood and Affect: Mood normal.         Behavior: Behavior normal.       Estee Hopkins, DO

## 2023-11-04 ENCOUNTER — NURSE TRIAGE (OUTPATIENT)
Dept: OTHER | Facility: OTHER | Age: 53
End: 2023-11-04

## 2023-11-04 ENCOUNTER — HOSPITAL ENCOUNTER (EMERGENCY)
Facility: HOSPITAL | Age: 53
Discharge: HOME | End: 2023-11-04
Attending: EMERGENCY MEDICINE
Payer: MEDICARE

## 2023-11-04 VITALS
DIASTOLIC BLOOD PRESSURE: 77 MMHG | SYSTOLIC BLOOD PRESSURE: 166 MMHG | RESPIRATION RATE: 22 BRPM | OXYGEN SATURATION: 98 % | TEMPERATURE: 98.2 F | HEART RATE: 67 BPM

## 2023-11-04 DIAGNOSIS — R07.89 CHEST DISCOMFORT: Primary | ICD-10-CM

## 2023-11-04 LAB
2HR DELTA HS TROPONIN: 0 NG/L
ALBUMIN SERPL BCP-MCNC: 4.5 G/DL (ref 3.5–5)
ALP SERPL-CCNC: 58 U/L (ref 34–104)
ALT SERPL W P-5'-P-CCNC: 11 U/L (ref 7–52)
ANION GAP SERPL CALCULATED.3IONS-SCNC: 8 MMOL/L
AST SERPL W P-5'-P-CCNC: 13 U/L (ref 13–39)
BASOPHILS # BLD AUTO: 0.05 THOUSANDS/ÂΜL (ref 0–0.1)
BASOPHILS NFR BLD AUTO: 0 % (ref 0–1)
BILIRUB SERPL-MCNC: 0.28 MG/DL (ref 0.2–1)
BUN SERPL-MCNC: 13 MG/DL (ref 5–25)
CALCIUM SERPL-MCNC: 9.2 MG/DL (ref 8.4–10.2)
CARDIAC TROPONIN I PNL SERPL HS: 3 NG/L
CARDIAC TROPONIN I PNL SERPL HS: 3 NG/L
CHLORIDE SERPL-SCNC: 106 MMOL/L (ref 96–108)
CO2 SERPL-SCNC: 27 MMOL/L (ref 21–32)
CREAT SERPL-MCNC: 0.62 MG/DL (ref 0.6–1.3)
EOSINOPHIL # BLD AUTO: 0.03 THOUSAND/ÂΜL (ref 0–0.61)
EOSINOPHIL NFR BLD AUTO: 0 % (ref 0–6)
ERYTHROCYTE [DISTWIDTH] IN BLOOD BY AUTOMATED COUNT: 14.3 % (ref 11.6–15.1)
GFR SERPL CREATININE-BSD FRML MDRD: 104 ML/MIN/1.73SQ M
GLUCOSE SERPL-MCNC: 99 MG/DL (ref 65–140)
HCT VFR BLD AUTO: 46.7 % (ref 34.8–46.1)
HGB BLD-MCNC: 14.9 G/DL (ref 11.5–15.4)
IMM GRANULOCYTES # BLD AUTO: 0.12 THOUSAND/UL (ref 0–0.2)
IMM GRANULOCYTES NFR BLD AUTO: 1 % (ref 0–2)
LYMPHOCYTES # BLD AUTO: 2.8 THOUSANDS/ÂΜL (ref 0.6–4.47)
LYMPHOCYTES NFR BLD AUTO: 25 % (ref 14–44)
MCH RBC QN AUTO: 30.2 PG (ref 26.8–34.3)
MCHC RBC AUTO-ENTMCNC: 31.9 G/DL (ref 31.4–37.4)
MCV RBC AUTO: 95 FL (ref 82–98)
MONOCYTES # BLD AUTO: 1.09 THOUSAND/ÂΜL (ref 0.17–1.22)
MONOCYTES NFR BLD AUTO: 10 % (ref 4–12)
NEUTROPHILS # BLD AUTO: 7.23 THOUSANDS/ÂΜL (ref 1.85–7.62)
NEUTS SEG NFR BLD AUTO: 64 % (ref 43–75)
NRBC BLD AUTO-RTO: 0 /100 WBCS
PLATELET # BLD AUTO: 173 THOUSANDS/UL (ref 149–390)
PMV BLD AUTO: 10.3 FL (ref 8.9–12.7)
POTASSIUM SERPL-SCNC: 4.3 MMOL/L (ref 3.5–5.3)
PROT SERPL-MCNC: 7.7 G/DL (ref 6.4–8.4)
RBC # BLD AUTO: 4.93 MILLION/UL (ref 3.81–5.12)
SODIUM SERPL-SCNC: 141 MMOL/L (ref 135–147)
WBC # BLD AUTO: 11.32 THOUSAND/UL (ref 4.31–10.16)

## 2023-11-04 PROCEDURE — 93005 ELECTROCARDIOGRAM TRACING: CPT

## 2023-11-04 PROCEDURE — 99285 EMERGENCY DEPT VISIT HI MDM: CPT

## 2023-11-04 PROCEDURE — 99285 EMERGENCY DEPT VISIT HI MDM: CPT | Performed by: EMERGENCY MEDICINE

## 2023-11-04 PROCEDURE — 80053 COMPREHEN METABOLIC PANEL: CPT | Performed by: EMERGENCY MEDICINE

## 2023-11-04 PROCEDURE — 84484 ASSAY OF TROPONIN QUANT: CPT | Performed by: EMERGENCY MEDICINE

## 2023-11-04 PROCEDURE — 36415 COLL VENOUS BLD VENIPUNCTURE: CPT

## 2023-11-04 PROCEDURE — 85025 COMPLETE CBC W/AUTO DIFF WBC: CPT | Performed by: EMERGENCY MEDICINE

## 2023-11-04 NOTE — TELEPHONE ENCOUNTER
Patient calling in initially to discuss a rash on her neck but then began to develop chest pains over the last hour. Patient stated she has been experiencing a sharp chest pain that comes and goes every few minutes, rates pain 5/10. Patient stated the pain does not radiate to any other part of her body. Patient stated in September she was diagnosed with a blood clot in her lungs, denies any SOB or trouble breathing. Recommended patient go to the nearest ED at this time. Patient stated she would go to the [facility] ED but unsure if she would be able to get a ride. Recommended patient call EMS if necessary to get transportation to the hospital, patient verbalized understanding and stated she would feel comfortable calling EMS for transport if needed.

## 2023-11-04 NOTE — TELEPHONE ENCOUNTER
Regarding: rash on neck/burning  ----- Message from Manasa Rivers sent at 11/4/2023  1:25 PM EDT -----  " I am now starting to have chest pain"    ----- Message from Keyur Alamo sent at 11/4/2023 12:15 PM EDT -----  Pt stated, "I was seen for a rash that I have on my neck. My provider gave me a powder to use four times a day. Since using this powder my neck is burning.  I would like to speak to a nurse."
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
## 2023-11-04 NOTE — ED ATTENDING ATTESTATION
11/4/2023  I, Williams Hung MD, saw and evaluated the patient. I have discussed the patient with the resident/non-physician practitioner and agree with the resident's/non-physician practitioner's findings, Plan of Care, and MDM as documented in the resident's/non-physician practitioner's note, except where noted. All available labs and Radiology studies were reviewed. I was present for key portions of any procedure(s) performed by the resident/non-physician practitioner and I was immediately available to provide assistance. At this point I agree with the current assessment done in the Emergency Department. I have conducted an independent evaluation of this patient a history and physical is as follows:    61-year-old female presented for evaluation of upper central chest pain beginning today at rest.  Not specifically worse with exertion. Has improved throughout the day. Minimal symptoms at this time. Denies any new shortness of breath or exercise intolerance. 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 Last INR 2 days ago was in the therapeutic range. Vitals are normal here. Plan EKG, labs, reevaluate. Differential diagnosis includes ACS, GERD, musculoskeletal strain. Unlikely recurrent pulmonary embolism. ED Course  ED Course as of 11/04/23 1817   Sat Nov 04, 2023   1616 EKG reviewed: NSR at 64. No acute ischemic changes. Similar to prior on 10/29/23.   1702 hs TnI 0hr: 3   1813 hs TnI 2hr: 3   1813 Delta 2hr hsTnI: 0   1813 Plan discharge home. HEART score 3. PCP follow-up. Return to ED if symptoms worsen.          Critical Care Time  Procedures

## 2023-11-04 NOTE — ED PROVIDER NOTES
History  Chief Complaint   Patient presents with    Chest Pain     Patient here for eval of chest pain that started today, states it does not radiate anywhere. Pt states she has a Hx of blood clots. Denies any other sx. HPI 60-year-old female with history of anxiety, asthma, Yan's esophagus, bipolar disorder, COPD, MAG, hypertension, hyperlipidemia, Sjogren's disease, PE on warfarin presents to the emergency department for evaluation of substernal nonradiating chest pain came on acutely today. She denies pleuritic chest pain. She denies any missed doses of her warfarin. She occasionally feels short of breath. Prior to Admission Medications   Prescriptions Last Dose Informant Patient Reported? Taking?    Diclofenac Sodium (VOLTAREN) 1 %  Self No No   Sig: Apply 2 g topically 4 (four) times a day   Patient not taking: Reported on 10/24/2023   RABEprazole (ACIPHEX) 20 MG tablet  Self Yes No   Sig: Take 20 mg by mouth 2 (two) times a day   albuterol (Ventolin HFA) 90 mcg/act inhaler  Self No No   Sig: Inhale 2 puffs every 6 (six) hours as needed for wheezing   Patient not taking: Reported on 11/1/2023   amLODIPine (NORVASC) 5 mg tablet  Self No No   Sig: TAKE 1 TABLET (5 MG TOTAL) BY MOUTH DAILY   atoMOXetine (STRATTERA) 40 mg capsule  Self Yes No   Patient not taking: Reported on 11/3/2023   buPROPion (WELLBUTRIN XL) 300 mg 24 hr tablet  Self No No   Sig: Take 1 tablet (300 mg total) by mouth daily   cholecalciferol (VITAMIN D3) 1,000 units tablet  Self No No   Sig: Take 1 tablet (1,000 Units total) by mouth daily   cloNIDine (CATAPRES) 0.1 mg tablet  Self No No   Sig: Take 1 tablet (0.1 mg total) by mouth every 12 (twelve) hours   divalproex sodium (DEPAKOTE) 250 mg DR tablet  Self No No   Sig: Take 3 tablets (750 mg total) by mouth in the morning   divalproex sodium (DEPAKOTE) 500 mg DR tablet  Self No No   Sig: Take 2 tablets (1,000 mg total) by mouth daily at bedtime   estradiol (ESTRACE) 0.5 MG tablet  Self No No   Sig: Take 1 tablet (0.5 mg total) by mouth daily   furosemide (LASIX) 40 mg tablet   No No   Sig: Take 1 tablet (40 mg total) by mouth daily   haloperidol decanoate (Haldol Decanoate) 50 mg/mL injection  Self Yes No   Sig: every 30 (thirty) days   hydrocortisone 2.5 % ointment  Self No No   Sig: Apply topically 2 (two) times a day   Patient not taking: Reported on 11/3/2023   levothyroxine (Euthyrox) 125 mcg tablet  Self No No   Sig: Take 1 tablet (125 mcg total) by mouth daily in the early morning   lidocaine (LIDODERM) 5 %  Self No No   Sig: Apply 1 patch topically over 12 hours daily Remove & Discard patch within 12 hours or as directed by MD   losartan (COZAAR) 50 mg tablet  Self No No   Sig: TAKE ONE TABLET BY MOUTH DAILY   lurasidone (LATUDA) 40 mg tablet  Self No No   Sig: Take 1 tablet (40 mg total) by mouth daily with breakfast   lurasidone (LATUDA) 40 mg tablet  Self No No   Sig: Take 1 tablet (40 mg total) by mouth daily with dinner   memantine (NAMENDA) 10 mg tablet  Self No No   Sig: Take 1 tablet (10 mg total) by mouth daily   metoprolol tartrate (LOPRESSOR) 25 mg tablet   No No   Sig: TAKE ONE TABLET BY MOUTH EVERY 12 HOURS   naltrexone (REVIA) 50 mg tablet  Self No No   Sig: Take 1 tablet (50 mg total) by mouth daily   nystatin (MYCOSTATIN) powder   No No   Sig: Apply topically 4 (four) times a day for 5 days   oxybutynin (DITROPAN) 5 mg tablet  Self No No   Sig: Take 1 tablet (5 mg total) by mouth 2 (two) times a day   pantoprazole (PROTONIX) 20 mg tablet  Self No No   Sig: Take 1 tablet (20 mg total) by mouth daily in the early morning   pilocarpine (SALAGEN) 5 mg tablet  Self Yes No   Sig: Take 5 mg by mouth 2 (two) times a day   rOPINIRole (REQUIP) 1 mg tablet  Self Yes No   Si mg   topiramate (TOPAMAX) 200 MG tablet  Self No No   Sig: Take 1 tablet (200 mg total) by mouth 2 (two) times a day   trospium chloride (SANCTURA) 20 mg tablet  Self Yes No   Sig: Take 20 mg by mouth 2 (two) times a day   warfarin (COUMADIN) 4 mg tablet  Self No No   Sig: Take 1 tablet (4 mg total) by mouth daily   warfarin (Coumadin) 2 mg tablet   No No   Sig: Take as directed      Facility-Administered Medications: None       Past Medical History:   Diagnosis Date    Anxiety     Arthritis     oa cassandra knees    Asthma     good control- no medications    Yan's esophagus     Bipolar affective disorder (HCC)     Cannabis abuse with unspecified 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HCC)     Chronic pain disorder     lumbar    COPD (chronic obstructive pulmonary disease) ([facility])     CPAP (continuous positive airway pressure) dependence     Degenerative disc disease at L5-S1 level     Deliberate self-cutting     9/15/22per pt has not done recently-- does see a therapist and psychiatrist regularly    Depression 09/16/2008    Disease of thyroid gland     hypo    MARTINEZ (dyspnea on exertion)     per pt "has had this with exertion and not new"    Drug overdose 10/28/2008    suicide attempt and again drug overdose 7/2022-- [facility] floor and than transferred to [address] [place] [address] Psych    Dysphagia     Dyspnea     Edema     BLE    Family history of blood clots     GERD (gastroesophageal reflux disease)     Headache(784.0)     Headache, tension-type     Hemorrhage of rectum and anus 10/02/2017    Formatting of this note might be different from the original.  Added automatically from request for surgery [id number]    High blood pressure     History of COVID-19 12/30/2020    Symptoms started on 12/30/2020 and then got worse. Today she is feeling a little bit better.   She is a candidate for monoclonal antibody infusion and set for 1/6/21 @ 1pm at [facility]. 01/07/21 - telemedicine -     Hyperlipidemia     Hypertension     Knee pain, bilateral     Especially right    Medical marijuana use     Memory loss     Migraines     Obesity     Overactive bladder     Pulmonary emboli ([address])     Sjogren's disease ([address])     Sleep apnea     Stress incontinence     Suicidal ideations     Teeth missing     Tremor     per pt Tremors of Right Leg and both Arms    Visual impairment     Wears glasses        Past Surgical History:   Procedure Laterality Date    BREAST CYST EXCISION Right 1989    CARPAL TUNNEL RELEASE Left     CHOLECYSTECTOMY  05/2003    Laparoscopic    COLONOSCOPY      01/12/2009    DILATION AND CURETTAGE OF UTERUS      ELBOW SURGERY Left     x2.  No hardware    ESOPHAGOGASTRODUODENOSCOPY      FOOT SURGERY Left     Plantar fasciotomy    HERNIA REPAIR      HYSTERECTOMY      laporoscopic, partial    KNEE ARTHROSCOPY Bilateral     OOPHORECTOMY Left 10/2015    AZ GASTROCNEMIUS RECESSION Left 02/24/2021    Procedure: RECESSION GASTROC OPEN;  Surgeon: Chet Talbot DPM;  Location: [facility] MAIN OR;  Service: Podiatry    AZ RPR UMBILICAL HRNA 5 YRS/> REDUCIBLE N/A 04/24/2019    Procedure: REPAIR HERNIA UMBILICAL LAPAROSCOPIC W/ ROBOTICS;  Surgeon: Rip Gonzalez MD;  Location: [facility] Main OR;  Service: General    AZ SPHINCTEROTOMY ANAL DIVISION SPHINCTER SPX N/A 08/31/2018    Procedure: EUA, LEFT PARTIAL INTERNAL SPHINCTEROTOMY;  Surgeon: Jasmyn Shaffer MD;  Location: [facility] MAIN OR;  Service: Colorectal    AZ TNOT ELBOW LATERAL/MEDIAL DEBRIDE OPEN TDN RPR Left 09/20/2022    Procedure: RELEASE EPICONDYLAR ELBOW MEDIAL;  Surgeon: Vicky Oliver MD;  Location: [facility] MAIN OR;  Service: Orthopedics    REDUCTION MAMMAPLASTY Bilateral 1999    REPAIR LACERATION Left     left hand-5/18/2009    REPLACEMENT TOTAL KNEE Right     ROTATOR CUFF REPAIR Left     TONSILECTOMY AND ADNOIDECTOMY      US GUIDED MSK PROCEDURE  04/22/2021    VASCULAR SURGERY      VEIN LIGATION Bilateral     WISDOM TOOTH EXTRACTION         Family History   Problem Relation Age of Onset    Kidney cancer Mother     Diabetes Mother     Depression Mother     Stroke Mother     Arthritis Mother     Cancer Mother     Psychiatric Illness Mother     No Known Problems Father     No Known Problems Sister     No Known Problems Maternal Grandmother     No Known Problems Maternal Grandfather     No Known Problems Paternal Grandmother     No Known Problems Paternal Grandfather     Colon cancer Maternal Uncle     Colon cancer Maternal Uncle     Colon cancer Paternal Uncle     Colon cancer Family     Alcohol abuse Sister     Asthma Sister      I have reviewed and agree with the history as documented. E-Cigarette/Vaping    E-Cigarette Use Current Some Day User     Comments medical THC      E-Cigarette/Vaping Substances    Nicotine No     THC Yes     CBD No     Flavoring No     Other No     Unknown No      Social History     Tobacco Use    Smoking status: Former     Packs/day: 2.00     Years: 30.00     Total pack years: 60.00     Types: Cigarettes     Start date: 1985     Quit date: 2018     Years since quittin.8    Smokeless tobacco: Never    Tobacco comments:     Started at age 14. Vaping Use    Vaping Use: Some days    Substances: THC   Substance Use Topics    Alcohol use: Not Currently     Comment: Recovering alcoholic    Drug use: Yes     Types: Marijuana, Methamphetamines     Comment: once month off meth        Review of Systems   Constitutional:  Negative for chills and fever. HENT:  Negative for ear pain and sore throat. Eyes:  Negative for visual disturbance. Respiratory:  Positive for chest tightness and shortness of breath. Negative for cough. Cardiovascular:  Positive for chest pain. Negative for leg swelling. Gastrointestinal:  Negative for abdominal pain, blood in stool, constipation, diarrhea, nausea and vomiting. Genitourinary:  Negative for dysuria and hematuria. Musculoskeletal:  Negative for neck pain and neck stiffness. Neurological:  Negative for dizziness, syncope, weakness and light-headedness. All other systems reviewed and are negative.       Physical Exam  ED Triage Vitals   Temperature Pulse Respirations Blood Pressure SpO2   23 1549 23 1535 23 1535 11/04/23 1535 11/04/23 1535   98.2 °F (36.8 °C) 83 22 139/83 95 %      Temp Source Heart Rate Source Patient Position - Orthostatic VS BP Location FiO2 (%)   11/04/23 1549 11/04/23 1535 11/04/23 1535 11/04/23 1535 --   Oral Monitor Sitting Right arm       Pain Score       11/04/23 1825       No Pain             Orthostatic Vital Signs  Vitals:    11/04/23 1535 11/04/23 1822   BP: 139/83 166/77   Pulse: 83 67   Patient Position - Orthostatic VS: Sitting Sitting       Physical Exam  Vitals and nursing note reviewed. Constitutional:       General: She is not in acute distress. Appearance: She is well-developed. She is not ill-appearing, toxic-appearing or diaphoretic. HENT:      Head: Normocephalic and atraumatic. Eyes:      Extraocular Movements: Extraocular movements intact. Conjunctiva/sclera: Conjunctivae normal.   Cardiovascular:      Rate and Rhythm: Normal rate and regular rhythm. Heart sounds: Normal heart sounds. Heart sounds not distant. No murmur heard. No systolic murmur is present. No diastolic murmur is present. No friction rub. No gallop. No S3 or S4 sounds. Pulmonary:      Effort: Pulmonary effort is normal. No tachypnea, accessory muscle usage or respiratory distress. Breath sounds: Normal breath sounds. No stridor. No decreased breath sounds, wheezing, rhonchi or rales. Abdominal:      Palpations: Abdomen is soft. Tenderness: There is no abdominal tenderness. There is no guarding or rebound. Musculoskeletal:         General: No swelling. Cervical back: Neck supple. Right lower leg: No tenderness. No edema. Left lower leg: No tenderness. No edema. Skin:     General: Skin is warm and dry. Capillary Refill: Capillary refill takes less than 2 seconds. Neurological:      General: No focal deficit present. Mental Status: She is alert and oriented to person, place, and time. Psychiatric:         Mood and Affect: Mood is anxious. Behavior: Behavior is agitated.          ED Medications  Medications - No data to display    Diagnostic Studies  Results Reviewed       Procedure Component Value Units Date/Time    HS Troponin I 2hr [[id number]]  (Normal) Collected: 11/04/23 1719    Lab Status: Final result Specimen: Blood from Arm, Right Updated: 11/04/23 1751     hs TnI 2hr 3 ng/L      Delta 2hr hsTnI 0 ng/L     HS Troponin 0hr (reflex protocol) [[id number]]  (Normal) Collected: 11/04/23 1540    Lab Status: Final result Specimen: Blood from Arm, Right Updated: 11/04/23 1617     hs TnI 0hr 3 ng/L     Comprehensive metabolic panel [[id number]] Collected: 11/04/23 1540    Lab Status: Final result Specimen: Blood from Arm, Right Updated: 11/04/23 1611     Sodium 141 mmol/L      Potassium 4.3 mmol/L      Chloride 106 mmol/L      CO2 27 mmol/L      ANION GAP 8 mmol/L      BUN 13 mg/dL      Creatinine 0.62 mg/dL      Glucose 99 mg/dL      Calcium 9.2 mg/dL      AST 13 U/L      ALT 11 U/L      Alkaline Phosphatase 58 U/L      Total Protein 7.7 g/dL      Albumin 4.5 g/dL      Total Bilirubin 0.28 mg/dL      eGFR 104 ml/min/1.73sq m     Narrative:      [place] guidelines for Chronic Kidney Disease (CKD):     Stage 1 with normal or high GFR (GFR > 90 mL/min/1.73 square meters)    Stage 2 Mild CKD (GFR = 60-89 mL/min/1.73 square meters)    Stage 3A Moderate CKD (GFR = 45-59 mL/min/1.73 square meters)    Stage 3B Moderate CKD (GFR = 30-44 mL/min/1.73 square meters)    Stage 4 Severe CKD (GFR = 15-29 mL/min/1.73 square meters)    Stage 5 End Stage CKD (GFR <15 mL/min/1.73 square meters)  Note: GFR calculation is accurate only with a steady state creatinine    CBC and differential [[id number]]  (Abnormal) Collected: 11/04/23 1540    Lab Status: Final result Specimen: Blood from Arm, Right Updated: 11/04/23 1558     WBC 11.32 Thousand/uL      RBC 4.93 Million/uL      Hemoglobin 14.9 g/dL      Hematocrit 46.7 %      MCV 95 fL      MCH 30.2 pg      MCHC 31.9 g/dL      RDW 14.3 %      MPV 10.3 fL      Platelets 001 Thousands/uL      nRBC 0 /100 WBCs      Neutrophils Relative 64 %      Immat GRANS % 1 %      Lymphocytes Relative 25 %      Monocytes Relative 10 %      Eosinophils Relative 0 %      Basophils Relative 0 %      Neutrophils Absolute 7.23 Thousands/µL      Immature Grans Absolute 0.12 Thousand/uL      Lymphocytes Absolute 2.80 Thousands/µL      Monocytes Absolute 1.09 Thousand/µL      Eosinophils Absolute 0.03 Thousand/µL      Basophils Absolute 0.05 Thousands/µL                    No orders to display         Procedures  ECG 12 Lead Documentation Only    Date/Time: 11/4/2023 4:45 PM    Performed by: Gael Vazquez MD  Authorized by: Gael Vazquez MD    Indications / Diagnosis:  Chest pain  ECG reviewed by me, the ED Provider: yes    Patient location:  ED  Previous ECG:     Previous ECG:  Compared to current  Interpretation:     Interpretation: abnormal    Rate:     ECG rate:  64    ECG rate assessment: normal    Rhythm:     Rhythm: sinus rhythm    Ectopy:     Ectopy: none    QRS:     QRS axis:  Left    QRS intervals:  Normal  Conduction:     Conduction: abnormal      Abnormal conduction: 1st degree    ST segments:     ST segments:  Normal  T waves:     T waves: normal    Comments:      Qtc 389        ED Course  ED Course as of 11/07/23 1649   Sat Nov 04, 2023   1704 hs TnI 0hr: 3             HEART Risk Score      Flowsheet Row Most Recent Value   Heart Score Risk Calculator    History 0 Filed at: 11/04/2023 1814   ECG 0 Filed at: 11/04/2023 1814   Age 1 Filed at: 11/04/2023 1814   Risk Factors 2 Filed at: 11/04/2023 1814   Troponin 0 Filed at: 11/04/2023 1814   HEART Score 3 Filed at: 11/04/2023 1814                        SBIRT 20yo+      Flowsheet Row Most Recent Value   Initial Alcohol Screen: US AUDIT-C     1. How often do you have a drink containing alcohol? 0 Filed at: 11/04/2023 1644   2.  How many drinks containing alcohol do you have on a typical day you are drinking? 0 Filed at: 11/04/2023 1644   3a. Male UNDER 65: How often do you have five or more drinks on one occasion? 0 Filed at: 11/04/2023 1644   3b. FEMALE Any Age, or MALE 65+: How often do you have 4 or more drinks on one occassion? 0 Filed at: 11/04/2023 1644   Audit-C Score 0 Filed at: 11/04/2023 1644   OLEG: How many times in the past year have you. .. Used an illegal drug or used a prescription medication for non-medical reasons? Never Filed at: 11/04/2023 1644                  Medical Decision Making  41-year-old female with history of anxiety, asthma, Yan's esophagus, bipolar disorder, COPD, MAG, hypertension, hyperlipidemia, Sjogren's disease, PE on warfarin presents to the emergency department for evaluation of substernal nonradiating chest pain came on acutely today. Differential diagnosis includes but not limited to: ACS/MI, anxiety, musculoskeletal, GERD    Here in the department, the patient is hemodynamically stable and afebrile, nontachycardic with normal work of breathing satting 98% on room air. She is anxious and agitated. Nontoxic-appearing. Her physical exam is unremarkable. EKG without evidence of acute ischemic changes. Laboratory work-up was unremarkable. Initial troponin is 3, 2-hour delta 0. Heart score 3. Discussed results with patient. Discussion of strict return precautions. Patient discharged home in stable condition. Amount and/or Complexity of Data Reviewed  Labs: ordered. Decision-making details documented in ED Course.           Disposition  Final diagnoses:   Chest discomfort     Time reflects when diagnosis was documented in both MDM as applicable and the Disposition within this note       Time User Action Codes Description Comment    11/4/2023  6:15 PM [employer] Add [R07.89] Chest discomfort           ED Disposition       ED Disposition   Discharge    Condition   Stable    Date/Time   Sat Nov 4, 2023 1405 [facility] discharge to home/self care.                    Follow-up Information       Follow up With Specialties Details Why Contact Info Additional [address], [address] [facility] Nurse Practitioner   [address] [phone]       [address] [facility] Emergency Medicine  If symptoms worsen [address]  Emergency Department, [place], [place], [address]            Discharge Medication List as of 11/4/2023  6:16 PM        CONTINUE these medications which have NOT CHANGED    Details   albuterol (Ventolin HFA) 90 mcg/act inhaler Inhale 2 puffs every 6 (six) hours as needed for wheezing, Starting Fri 9/15/2023, Normal      amLODIPine (NORVASC) 5 mg tablet TAKE 1 TABLET (5 MG TOTAL) BY MOUTH DAILY, Starting Wed 7/12/2023, Normal      atoMOXetine (STRATTERA) 40 mg capsule Historical Med      buPROPion (WELLBUTRIN XL) 300 mg 24 hr tablet Take 1 tablet (300 mg total) by mouth daily, Starting Fri 10/6/2023, Until Sun 11/5/2023, Normal      cholecalciferol (VITAMIN D3) 1,000 units tablet Take 1 tablet (1,000 Units total) by mouth daily, Starting Fri 10/6/2023, Until Sun 11/5/2023, Normal      cloNIDine (CATAPRES) 0.1 mg tablet Take 1 tablet (0.1 mg total) by mouth every 12 (twelve) hours, Starting Fri 10/6/2023, Until Sun 11/5/2023, Normal      Diclofenac Sodium (VOLTAREN) 1 % Apply 2 g topically 4 (four) times a day, Starting Mon 7/17/2023, Normal      !! divalproex sodium (DEPAKOTE) 250 mg DR tablet Take 3 tablets (750 mg total) by mouth in the morning, Starting Fri 10/6/2023, Until Sun 11/5/2023, Normal      !! divalproex sodium (DEPAKOTE) 500 mg DR tablet Take 2 tablets (1,000 mg total) by mouth daily at bedtime, Starting Fri 10/6/2023, Until Sun 11/5/2023, Normal      estradiol (ESTRACE) 0.5 MG tablet Take 1 tablet (0.5 mg total) by mouth daily, Starting Thu 5/4/2023, Normal      furosemide (LASIX) 40 mg tablet Take 1 tablet (40 mg total) by mouth daily, Starting Wed 10/18/2023, Normal      haloperidol decanoate (Haldol Decanoate) 50 mg/mL injection every 30 (thirty) days, Starting Wed 8/3/2022, Historical Med      hydrocortisone 2.5 % ointment Apply topically 2 (two) times a day, Starting Tue 10/25/2022, Normal      levothyroxine (Euthyrox) 125 mcg tablet Take 1 tablet (125 mcg total) by mouth daily in the early morning, Starting Fri 10/6/2023, Until Sun 11/5/2023, Normal      lidocaine (LIDODERM) 5 % Apply 1 patch topically over 12 hours daily Remove & Discard patch within 12 hours or as directed by MD, Starting Fri 10/6/2023, No Print      losartan (COZAAR) 50 mg tablet TAKE ONE TABLET BY MOUTH DAILY, Normal      !! lurasidone (LATUDA) 40 mg tablet Take 1 tablet (40 mg total) by mouth daily with breakfast, Starting Fri 10/6/2023, Until Sun 11/5/2023, Normal      !! lurasidone (LATUDA) 40 mg tablet Take 1 tablet (40 mg total) by mouth daily with dinner, Starting Fri 10/6/2023, Until Sun 11/5/2023, Normal      memantine (NAMENDA) 10 mg tablet Take 1 tablet (10 mg total) by mouth daily, Starting Fri 10/6/2023, Until Sun 11/5/2023, Normal      metoprolol tartrate (LOPRESSOR) 25 mg tablet TAKE ONE TABLET BY MOUTH EVERY 12 HOURS, Normal      naltrexone (REVIA) 50 mg tablet Take 1 tablet (50 mg total) by mouth daily, Starting Fri 10/6/2023, Until Sun 11/5/2023, Normal      nystatin (MYCOSTATIN) powder Apply topically 4 (four) times a day for 5 days, Starting Fri 11/3/2023, Until Wed 11/8/2023, Normal      oxybutynin (DITROPAN) 5 mg tablet Take 1 tablet (5 mg total) by mouth 2 (two) times a day, Starting Fri 10/6/2023, Until Sun 11/5/2023, Normal      pantoprazole (PROTONIX) 20 mg tablet Take 1 tablet (20 mg total) by mouth daily in the early morning, Starting Fri 10/6/2023, Until Sun 11/5/2023, Normal      pilocarpine (SALAGEN) 5 mg tablet Take 5 mg by mouth 2 (two) times a day, Historical Med      RABEprazole (ACIPHEX) 20 MG tablet Take 20 mg by mouth 2 (two) times a day, Historical Med      rOPINIRole (REQUIP) 1 mg tablet 1 mg, Starting Fri 10/13/2023, Historical Med      topiramate (TOPAMAX) 200 MG tablet Take 1 tablet (200 mg total) by mouth 2 (two) times a day, Starting Fri 10/6/2023, Until Sun 11/5/2023, Normal      trospium chloride (SANCTURA) 20 mg tablet Take 20 mg by mouth 2 (two) times a day, Starting Thu 1/12/2023, Until Fri 1/12/2024, Historical Med      !! warfarin (Coumadin) 2 mg tablet Take as directed, Normal      !! warfarin (COUMADIN) 4 mg tablet Take 1 tablet (4 mg total) by mouth daily, Starting Mon 10/23/2023, Until Wed 11/22/2023, Normal       !! - Potential duplicate medications found. Please discuss with provider. No discharge procedures on file. PDMP Review         Value Time User    PDMP Reviewed  Yes 9/29/2023  8:09 AM Candace Mary MD             ED Provider  Attending physically available and evaluated Jermaine Merino. I managed the patient along with the ED Attending.     Electronically Signed by           Bryan Rader MD  11/07/23 2159

## 2023-11-04 NOTE — TELEPHONE ENCOUNTER
Reason for Disposition  • History of prior "blood clot" in leg or lungs (i.e., deep vein thrombosis, pulmonary embolism)    Answer Assessment - Initial Assessment Questions  1. LOCATION: "Where does it hurt?"        Center of chest     2. RADIATION: "Does the pain go anywhere else?" (e.g., into neck, jaw, arms, back)      Denies any radiation     3. ONSET: "When did the chest pain begin?" (Minutes, hours or days)       Today within last hour     4. PATTERN "Does the pain come and go, or has it been constant since it started?"  "Does it get worse with exertion?"       Comes and goes     5. DURATION: "How long does it last" (e.g., seconds, minutes, hours)      Lasts for about a minute and stops for a few minutes and then restarts    6. SEVERITY: "How bad is the pain?"  (e.g., Scale 1-10; mild, moderate, or severe)     - MILD (1-3): doesn't interfere with normal activities      - MODERATE (4-7): interferes with normal activities or awakens from sleep     - SEVERE (8-10): excruciating pain, unable to do any normal activities        Sharp pain 5/10    7. CARDIAC RISK FACTORS: "Do you have any history of heart problems or risk factors for heart disease?" (e.g., angina, prior heart attack; diabetes, high blood pressure, high cholesterol, smoker, or strong family history of heart disease)      Hypertension     8. PULMONARY RISK FACTORS: "Do you have any history of lung disease?"  (e.g., blood clots in lung, asthma, emphysema, birth control pills)      PE in lung dx in September, asthma, COPD     9. CAUSE: "What do you think is causing the chest pain?"      Unknown     10.  OTHER SYMPTOMS: "Do you have any other symptoms?" (e.g., dizziness, nausea, vomiting, sweating, fever, difficulty breathing, cough)        Rash on neck    Protocols used: Chest Pain-ADULT-

## 2023-11-05 LAB
ATRIAL RATE: 64 BPM
P AXIS: 38 DEGREES
PR INTERVAL: 200 MS
QRS AXIS: -7 DEGREES
QRSD INTERVAL: 86 MS
QT INTERVAL: 378 MS
QTC INTERVAL: 389 MS
T WAVE AXIS: 44 DEGREES
VENTRICULAR RATE: 64 BPM

## 2023-11-05 PROCEDURE — 93010 ELECTROCARDIOGRAM REPORT: CPT | Performed by: INTERNAL MEDICINE

## 2023-11-06 ENCOUNTER — TELEPHONE (OUTPATIENT)
Dept: PSYCHIATRY | Facility: CLINIC | Age: 53
End: 2023-11-06

## 2023-11-06 ENCOUNTER — HOSPITAL ENCOUNTER (INPATIENT)
Facility: HOSPITAL | Age: 53
LOS: 3 days | DRG: 917 | End: 2023-11-10
Attending: EMERGENCY MEDICINE | Admitting: INTERNAL MEDICINE
Payer: MEDICARE

## 2023-11-06 ENCOUNTER — PATIENT OUTREACH (OUTPATIENT)
Dept: CASE MANAGEMENT | Facility: OTHER | Age: 53
End: 2023-11-06

## 2023-11-06 DIAGNOSIS — Z00.8 MEDICAL CLEARANCE FOR PSYCHIATRIC ADMISSION: ICD-10-CM

## 2023-11-06 DIAGNOSIS — T50.902A INTENTIONAL OVERDOSE, INITIAL ENCOUNTER (HCC): Primary | ICD-10-CM

## 2023-11-06 DIAGNOSIS — T14.91XA SUICIDE ATTEMPT (HCC): ICD-10-CM

## 2023-11-06 LAB
ALBUMIN SERPL BCP-MCNC: 4 G/DL (ref 3.5–5)
ALP SERPL-CCNC: 49 U/L (ref 34–104)
ALT SERPL W P-5'-P-CCNC: 11 U/L (ref 7–52)
AMMONIA PLAS-SCNC: 63 UMOL/L (ref 18–72)
AMPHETAMINES SERPL QL SCN: NEGATIVE
ANION GAP SERPL CALCULATED.3IONS-SCNC: 9 MMOL/L
APAP SERPL-MCNC: <10 UG/ML (ref 10–20)
APTT PPP: 50 SECONDS (ref 23–37)
AST SERPL W P-5'-P-CCNC: 12 U/L (ref 13–39)
BARBITURATES UR QL: NEGATIVE
BASE EX.OXY STD BLDV CALC-SCNC: 91.3 % (ref 60–80)
BASE EX.OXY STD BLDV CALC-SCNC: 94.8 % (ref 60–80)
BASE EXCESS BLDV CALC-SCNC: -1.8 MMOL/L
BASE EXCESS BLDV CALC-SCNC: -5.6 MMOL/L
BASOPHILS # BLD AUTO: 0.05 THOUSANDS/ÂΜL (ref 0–0.1)
BASOPHILS NFR BLD AUTO: 1 % (ref 0–1)
BENZODIAZ UR QL: NEGATIVE
BILIRUB SERPL-MCNC: 0.23 MG/DL (ref 0.2–1)
BILIRUB UR QL STRIP: NEGATIVE
BUN SERPL-MCNC: 15 MG/DL (ref 5–25)
CALCIUM SERPL-MCNC: 9 MG/DL (ref 8.4–10.2)
CHLORIDE SERPL-SCNC: 107 MMOL/L (ref 96–108)
CLARITY UR: ABNORMAL
CO2 SERPL-SCNC: 23 MMOL/L (ref 21–32)
COCAINE UR QL: NEGATIVE
COLOR UR: YELLOW
CREAT SERPL-MCNC: 0.73 MG/DL (ref 0.6–1.3)
EOSINOPHIL # BLD AUTO: 0.09 THOUSAND/ÂΜL (ref 0–0.61)
EOSINOPHIL NFR BLD AUTO: 1 % (ref 0–6)
ERYTHROCYTE [DISTWIDTH] IN BLOOD BY AUTOMATED COUNT: 14.1 % (ref 11.6–15.1)
ETHANOL SERPL-MCNC: <10 MG/DL
GFR SERPL CREATININE-BSD FRML MDRD: 94 ML/MIN/1.73SQ M
GLUCOSE SERPL-MCNC: 95 MG/DL (ref 65–140)
GLUCOSE SERPL-MCNC: 95 MG/DL (ref 65–140)
GLUCOSE UR STRIP-MCNC: NEGATIVE MG/DL
HCG SERPL QL: NEGATIVE
HCO3 BLDV-SCNC: 17.1 MMOL/L (ref 24–30)
HCO3 BLDV-SCNC: 22.1 MMOL/L (ref 24–30)
HCT VFR BLD AUTO: 45.2 % (ref 34.8–46.1)
HGB BLD-MCNC: 14.6 G/DL (ref 11.5–15.4)
HGB UR QL STRIP.AUTO: NEGATIVE
IMM GRANULOCYTES # BLD AUTO: 0.07 THOUSAND/UL (ref 0–0.2)
IMM GRANULOCYTES NFR BLD AUTO: 1 % (ref 0–2)
INR PPP: 2.97 (ref 0.84–1.19)
KETONES UR STRIP-MCNC: NEGATIVE MG/DL
LEUKOCYTE ESTERASE UR QL STRIP: NEGATIVE
LYMPHOCYTES # BLD AUTO: 2.7 THOUSANDS/ÂΜL (ref 0.6–4.47)
LYMPHOCYTES NFR BLD AUTO: 26 % (ref 14–44)
MCH RBC QN AUTO: 30 PG (ref 26.8–34.3)
MCHC RBC AUTO-ENTMCNC: 32.3 G/DL (ref 31.4–37.4)
MCV RBC AUTO: 93 FL (ref 82–98)
METHADONE UR QL: NEGATIVE
MONOCYTES # BLD AUTO: 0.93 THOUSAND/ÂΜL (ref 0.17–1.22)
MONOCYTES NFR BLD AUTO: 9 % (ref 4–12)
NEUTROPHILS # BLD AUTO: 6.52 THOUSANDS/ÂΜL (ref 1.85–7.62)
NEUTS SEG NFR BLD AUTO: 62 % (ref 43–75)
NITRITE UR QL STRIP: NEGATIVE
NRBC BLD AUTO-RTO: 0 /100 WBCS
O2 CT BLDV-SCNC: 18.2 ML/DL
O2 CT BLDV-SCNC: 19.5 ML/DL
OPIATES UR QL SCN: NEGATIVE
OXYCODONE+OXYMORPHONE UR QL SCN: NEGATIVE
PCO2 BLDV: 26.9 MM HG (ref 42–50)
PCO2 BLDV: 35.5 MM HG (ref 42–50)
PCP UR QL: NEGATIVE
PH BLDV: 7.41 [PH] (ref 7.3–7.4)
PH BLDV: 7.42 [PH] (ref 7.3–7.4)
PH UR STRIP.AUTO: 7.5 [PH]
PLATELET # BLD AUTO: 147 THOUSANDS/UL (ref 149–390)
PMV BLD AUTO: 10 FL (ref 8.9–12.7)
PO2 BLDV: 68.4 MM HG (ref 35–45)
PO2 BLDV: 91 MM HG (ref 35–45)
POTASSIUM SERPL-SCNC: 3.8 MMOL/L (ref 3.5–5.3)
PROT SERPL-MCNC: 7.2 G/DL (ref 6.4–8.4)
PROT UR STRIP-MCNC: NEGATIVE MG/DL
PROTHROMBIN TIME: 30.3 SECONDS (ref 11.6–14.5)
RBC # BLD AUTO: 4.87 MILLION/UL (ref 3.81–5.12)
SALICYLATES SERPL-MCNC: <5 MG/DL (ref 3–20)
SODIUM SERPL-SCNC: 139 MMOL/L (ref 135–147)
SP GR UR STRIP.AUTO: 1.02 (ref 1–1.03)
T4 FREE SERPL-MCNC: 0.81 NG/DL (ref 0.61–1.12)
THC UR QL: POSITIVE
TSH SERPL DL<=0.05 MIU/L-ACNC: 8.65 UIU/ML (ref 0.45–4.5)
UROBILINOGEN UR QL STRIP.AUTO: 0.2 E.U./DL
VALPROATE SERPL-MCNC: 58 UG/ML (ref 50–100)
WBC # BLD AUTO: 10.36 THOUSAND/UL (ref 4.31–10.16)

## 2023-11-06 PROCEDURE — 82948 REAGENT STRIP/BLOOD GLUCOSE: CPT

## 2023-11-06 PROCEDURE — 99449 NTRPROF PH1/NTRNET/EHR 31/>: CPT | Performed by: EMERGENCY MEDICINE

## 2023-11-06 PROCEDURE — 84703 CHORIONIC GONADOTROPIN ASSAY: CPT | Performed by: EMERGENCY MEDICINE

## 2023-11-06 PROCEDURE — 93005 ELECTROCARDIOGRAM TRACING: CPT

## 2023-11-06 PROCEDURE — 82805 BLOOD GASES W/O2 SATURATION: CPT | Performed by: INTERNAL MEDICINE

## 2023-11-06 PROCEDURE — 99223 1ST HOSP IP/OBS HIGH 75: CPT | Performed by: PHYSICIAN ASSISTANT

## 2023-11-06 PROCEDURE — 80143 DRUG ASSAY ACETAMINOPHEN: CPT | Performed by: EMERGENCY MEDICINE

## 2023-11-06 PROCEDURE — 36415 COLL VENOUS BLD VENIPUNCTURE: CPT | Performed by: EMERGENCY MEDICINE

## 2023-11-06 PROCEDURE — 84443 ASSAY THYROID STIM HORMONE: CPT | Performed by: EMERGENCY MEDICINE

## 2023-11-06 PROCEDURE — 85025 COMPLETE CBC W/AUTO DIFF WBC: CPT | Performed by: EMERGENCY MEDICINE

## 2023-11-06 PROCEDURE — 82805 BLOOD GASES W/O2 SATURATION: CPT | Performed by: EMERGENCY MEDICINE

## 2023-11-06 PROCEDURE — 85730 THROMBOPLASTIN TIME PARTIAL: CPT | Performed by: EMERGENCY MEDICINE

## 2023-11-06 PROCEDURE — 81003 URINALYSIS AUTO W/O SCOPE: CPT | Performed by: EMERGENCY MEDICINE

## 2023-11-06 PROCEDURE — 80179 DRUG ASSAY SALICYLATE: CPT | Performed by: EMERGENCY MEDICINE

## 2023-11-06 PROCEDURE — 99285 EMERGENCY DEPT VISIT HI MDM: CPT

## 2023-11-06 PROCEDURE — 85610 PROTHROMBIN TIME: CPT | Performed by: EMERGENCY MEDICINE

## 2023-11-06 PROCEDURE — 80053 COMPREHEN METABOLIC PANEL: CPT | Performed by: EMERGENCY MEDICINE

## 2023-11-06 PROCEDURE — 82140 ASSAY OF AMMONIA: CPT | Performed by: EMERGENCY MEDICINE

## 2023-11-06 PROCEDURE — 82077 ASSAY SPEC XCP UR&BREATH IA: CPT | Performed by: EMERGENCY MEDICINE

## 2023-11-06 PROCEDURE — 84439 ASSAY OF FREE THYROXINE: CPT | Performed by: EMERGENCY MEDICINE

## 2023-11-06 PROCEDURE — 80164 ASSAY DIPROPYLACETIC ACD TOT: CPT | Performed by: EMERGENCY MEDICINE

## 2023-11-06 PROCEDURE — 80307 DRUG TEST PRSMV CHEM ANLYZR: CPT | Performed by: EMERGENCY MEDICINE

## 2023-11-06 RX ORDER — FUROSEMIDE 40 MG/1
40 TABLET ORAL DAILY
Status: DISCONTINUED | OUTPATIENT
Start: 2023-11-06 | End: 2023-11-10 | Stop reason: HOSPADM

## 2023-11-06 RX ORDER — LORAZEPAM 2 MG/ML
1 INJECTION INTRAMUSCULAR EVERY 4 HOURS PRN
Status: DISCONTINUED | OUTPATIENT
Start: 2023-11-06 | End: 2023-11-07

## 2023-11-06 RX ORDER — LOSARTAN POTASSIUM 50 MG/1
50 TABLET ORAL DAILY
Status: DISCONTINUED | OUTPATIENT
Start: 2023-11-06 | End: 2023-11-10 | Stop reason: HOSPADM

## 2023-11-06 RX ORDER — CLONIDINE HYDROCHLORIDE 0.1 MG/1
0.1 TABLET ORAL EVERY 12 HOURS SCHEDULED
Status: DISCONTINUED | OUTPATIENT
Start: 2023-11-06 | End: 2023-11-10 | Stop reason: HOSPADM

## 2023-11-06 RX ORDER — SODIUM CHLORIDE, SODIUM LACTATE, POTASSIUM CHLORIDE, CALCIUM CHLORIDE 600; 310; 30; 20 MG/100ML; MG/100ML; MG/100ML; MG/100ML
100 INJECTION, SOLUTION INTRAVENOUS CONTINUOUS
Status: DISCONTINUED | OUTPATIENT
Start: 2023-11-06 | End: 2023-11-08

## 2023-11-06 RX ORDER — TOPIRAMATE 100 MG/1
200 TABLET, FILM COATED ORAL 2 TIMES DAILY
Status: DISCONTINUED | OUTPATIENT
Start: 2023-11-06 | End: 2023-11-06

## 2023-11-06 RX ORDER — NALTREXONE HYDROCHLORIDE 50 MG/1
50 TABLET, FILM COATED ORAL DAILY
Status: DISCONTINUED | OUTPATIENT
Start: 2023-11-06 | End: 2023-11-10 | Stop reason: HOSPADM

## 2023-11-06 RX ORDER — NYSTATIN 100000 [USP'U]/G
POWDER TOPICAL 2 TIMES DAILY
Status: DISCONTINUED | OUTPATIENT
Start: 2023-11-06 | End: 2023-11-10 | Stop reason: HOSPADM

## 2023-11-06 RX ORDER — MELATONIN
1000 DAILY
Status: DISCONTINUED | OUTPATIENT
Start: 2023-11-06 | End: 2023-11-10 | Stop reason: HOSPADM

## 2023-11-06 RX ORDER — LEVOTHYROXINE SODIUM 0.12 MG/1
125 TABLET ORAL
Status: DISCONTINUED | OUTPATIENT
Start: 2023-11-07 | End: 2023-11-10 | Stop reason: HOSPADM

## 2023-11-06 RX ORDER — ESTRADIOL 1 MG/1
0.5 TABLET ORAL DAILY
Status: DISCONTINUED | OUTPATIENT
Start: 2023-11-06 | End: 2023-11-06

## 2023-11-06 RX ORDER — DIVALPROEX SODIUM 500 MG/1
1000 TABLET, DELAYED RELEASE ORAL
Status: DISCONTINUED | OUTPATIENT
Start: 2023-11-06 | End: 2023-11-06

## 2023-11-06 RX ORDER — AMLODIPINE BESYLATE 5 MG/1
5 TABLET ORAL DAILY
Status: DISCONTINUED | OUTPATIENT
Start: 2023-11-06 | End: 2023-11-10 | Stop reason: HOSPADM

## 2023-11-06 RX ORDER — PANTOPRAZOLE SODIUM 20 MG/1
20 TABLET, DELAYED RELEASE ORAL
Status: DISCONTINUED | OUTPATIENT
Start: 2023-11-07 | End: 2023-11-10 | Stop reason: HOSPADM

## 2023-11-06 RX ORDER — WARFARIN SODIUM 4 MG/1
4 TABLET ORAL DAILY
Status: DISCONTINUED | OUTPATIENT
Start: 2023-11-06 | End: 2023-11-10

## 2023-11-06 RX ADMIN — SODIUM CHLORIDE, SODIUM LACTATE, POTASSIUM CHLORIDE, AND CALCIUM CHLORIDE 100 ML/HR: .6; .31; .03; .02 INJECTION, SOLUTION INTRAVENOUS at 18:52

## 2023-11-06 RX ADMIN — AMLODIPINE BESYLATE 5 MG: 5 TABLET ORAL at 17:40

## 2023-11-06 RX ADMIN — Medication 1000 UNITS: at 17:40

## 2023-11-06 RX ADMIN — LOSARTAN POTASSIUM 50 MG: 50 TABLET, FILM COATED ORAL at 17:40

## 2023-11-06 RX ADMIN — NYSTATIN: 100000 POWDER TOPICAL at 17:40

## 2023-11-06 RX ADMIN — FUROSEMIDE 40 MG: 40 TABLET ORAL at 17:40

## 2023-11-06 RX ADMIN — DIVALPROEX SODIUM 750 MG: 500 TABLET, DELAYED RELEASE ORAL at 17:40

## 2023-11-06 RX ADMIN — TOPIRAMATE 200 MG: 100 TABLET ORAL at 17:40

## 2023-11-06 RX ADMIN — WARFARIN SODIUM 4 MG: 4 TABLET ORAL at 17:41

## 2023-11-06 RX ADMIN — NALTREXONE HYDROCHLORIDE 50 MG: 50 TABLET, FILM COATED ORAL at 17:40

## 2023-11-06 RX ADMIN — CLONIDINE HYDROCHLORIDE 0.1 MG: 0.1 TABLET ORAL at 17:40

## 2023-11-06 NOTE — CONSULTS
INTERPROFESSIONAL (PHONE) [id number] [facility] Toxicology  Aleksander Scott 47 y.o. female MRN: [id number]  Unit/Bed#: ED 04 Encounter: [id number]      Reason for Consult / Principal Problem: overdose  Inpatient consult to Toxicology  Consult performed by: Geovany Valiente MD  Consult ordered by: Darius Foster MD        11/06/23      ASSESSMENT:  26-year-old female with intentional cyclobenzaprine overdose. RECOMMENDATIONS:  Cyclobenzaprine is a centrally acting muscle relaxent. It is structurally related to tricyclic antidepressants, but exhibits minimal cardiotoxic effects. In overdose it can produce anticholinergic affects, such as tachycardia, dilated pupils, and delirium. Usually, it affects the CNS by causing decreased alertness and sedation. Treatment is supportive. Physostigmine has reportedly been used in overdose to reverse anticholinergic symptoms, however it is not generally needed, and may potentially cause seizures. Given that the patient is also on Depakote, I would check a valproate level and ammonia level. If these are supratherapeutic/elevated, please reach out to the  on-call. Bupropion (Wellbutrin) is a unicyclic anti-depressant. It works primarily by inhibiting the re-uptake of dopamine, with lesser inhibition on the re-uptake of serotonin and norepinephrine. It comes primarily in extended release formulations. Bupropion is rapidly absorbed by the GI tract and is metabolized to hydroxybupropion in the liver. This metabolite is thought to be responsible for the medication's neurotoxicity, however the mechanism is unclear. Other metabolites have similar activity as bupropion but decreased activity. The metabolites of Bupropion are excreted primarily in the urine. Bupropion reaches peak serum levels within 2-5 hours depending on the formulation and hydroxybupropion levels peak between 3 and 7 hours after ingestion.  In massive overdose, absorption may be delayed. Acute toxicity may result in tachycardia, hypertension, GI symptoms, agitation, seizures, and QRS widening. These symptoms can last up to 48 hours after an acute ingestion. There are case reports of delayed cardiotoxicity, manifest as sodium channel blockade and QRS widening, in ingestions involving sustained release formulations. These cases  showed EKG abnormalities within 8-12 hours of the time of ingestion. 1.  Patient will be needed to be admitted for 24 hours due to delayed seizures     2. Seizure precautions     3. Benzodiazepines for seizures and agitation     4. Avoid neuroleptics, anticholinergic, and serotonergic agents at this time     5. Q1 EKGs while patient is tachycardic  - if QRS > 120, give 1-2 mEq per kg sodium bicarbonate  - goal: QRS < 120, titrate to pH 7.55, K > 4.0  - if QRS widening is refractory to sodium bicarbonate, please administer IV lidocaine or hypertonic saline     6. As with all overdoses, recommend social work/psychiatry when appropriate. 7.  Given that patient has already exhibited self-injurous behavior, consider switching patient to an antidepressant that is not associated with high risk and severe toxicity in the setting of an overdose.    - The following antidepressant have high risk of toxicity in the setting of an overdose:              A) Wellbutrin (Buproprion)                  - wide QRS, cardiac arrhythmias, delayed seizures up to 24 hours              B) Celexa (Citalopram)                  - long QTc, delayed cardiac arrhythmias up to 24 hrs, seizures              C) Lexapra (Escitalopram)                  - long QTc, delayed cardiac arrhythmias up to 24 hrs, seizures              D) Effexor (Venlafaxine)                  - wide QRS, long QTc, cardiac arrhythmias, seizures,                                    hypoglycemia, serotonin syndrome      For further questions, please contact the medical  on call via [place] [employer] or throughl the [facility]  Service or [facility] [employer]. Please see additional teaching note below:    Medical Toxicology  [address] [facility]  Anticholinergic Syndrome  Updated 03/04/2008    Background: Anticholinergic intoxication can occur from exposure to a wide variety of prescription and over-the-counter medications and numerous plants and mushrooms. Common drugs that have anticholinergic activities include: antihistamines, antipsychotics, antispasmodics, skeletal muscle relaxants, and tricyclic antidepressants (TCAs). Plants and mushrooms containing anticholinergic alkaloids include: jimsonweed (Datura stramonium), deadly nightshade (Atropa belladonna), and fly agaric (Rozina Line). Mechanism of Toxicity:  Competitively antagonize the effect of acetylcholine at peripheral muscarinic receptors. They mostly affect exocrine glands (such as sweating and salivation) and smooth muscle. Inhibition of muscarinic activities in the heart leads to a rapid heartbeat. Pharmacokinetics: Absorption may be delayed due to its effect on GI motility. Duration of toxic effect can be quite prolonged (e.g. benztropine  2-3 days)  Toxic dose: The range of toxicity is highly variable and unpredictable. Examples of Fatal doses from case reports: Young Child: 1-2 mg Atropine, instilled in the eye  Adult: 32 mg Atropine, IM injection  Minimal effect: increased heart rate and dry mouth after 360 mg of trospium chloride  Clinical presentation: Anticholinergic syndrome is characterized by warm, dry, flushed skin, dry mucous membranes, mydriasis, delirium, tachycardia, ileus, and urinary retention. Jerky myoclonic movements and choreoathetosis are common and can lead to rhabdomyolysis. Hyperthermia, coma, respiratory arrest and seizures may occur. Diagnosis: Based on history of exposure and typical features of anticholinergic syndrome.  Trial dose of physostigmine can be used to confirm the diagnosis, especially with rapid reversal of the syndrome. Laboratory studies: Not generally available for the anticholinergics, but other labs can be useful such as electrolytes, glucose, CPK, and ECG. Treatment:   ABC  Seizure and muscular hyperactivity: Should be treated with benzodiazepines. Treat aggressively to prevent hyperthermia and rhabdomyolysis and their resultant complications. If unsuccessful use phenobarbital or propofol. Delirium: Treat with benzodiazepines, if resistant to benzodiazepines consider treating with physostigmine. GI decontamination maybe helpful in delayed presentation secondary to decreased GI motility provided that the patient is awake and alert. Enhanced elimination: Procedures such as hemodialysis and repeated-dose activated charcoal are not effective in removing anticholinergic agents. Physostigmine:   Pharmacology: Physostigmine is a carbamate and a reversible inhibitor of acetylcholinesterase. It increases acetylcholine concentration, causing stimulation of both muscarinic and nicotinic receptors. It also can penetrate the blood-brain barrier. It has nonspecific arousal effects. Onset of action: 3-8 minutes after parenteral administration. Duration of action: 30-90 minutes. Elimination half-life: 15-40 minutes. Indications:  Severe anticholinergic syndrome from antimuscarinic agents. Generally used to reverse delirium resistant to benzodiazepines. Diagnostically: To differentiate functional psychosis from anticholinergic delirium. Contraindications:  Should not be used as an antidote for cyclic antidepressant overdose because it may worsen cardiac conduction disturbance, cause bradyarrythmias or asystole, and aggravate or precipitate seizures. Do not use physostigmine with concurrent use of depolarizing neuromuscular blockers (e.g. succinylcholine). Known hypersensitivity to agent or preservative.   Relative contraindication may include: Asthma, peripheral vascular disease, intestinal and bladder blockade, parkinsonian syndrome, and AV Block. Adverse effects:  Bradycardia, heart block, and asystole. Seizure (particularly with rapid administration or excessive dose). Nausea, vomiting, hypersalivation, and diarrhea. Bronchorrhea and bronchospasm. Fasciculation and muscle weakness. Dosage:  Adult: 0.5-2 mg slow IV push (£ 1 mg/min). Children: 0.02 mg/kg slow IV push (£ 0.5 mg/min). Repeat as needed every 10-30 minutes. Precautions: Patient should be on a cardiac monitor. Atropine should be kept at bedside to treat excessive muscarinic stimulation. Should not be administered IM or as a continuous infusion. It is better to undertreat than to overtreat. Physostigmine toxicity results when used in excess or in the absence of antimuscarinic toxicity. References:  Elizabeth Sales. Poisoning & Drug Overdose. 2007. Betty LAWTON. Betty’s Toxicologic Emergencies. 2006. Hx and PE limited by the dynamics of a phone consultation. I have not personally interviewed or evaluated the patient, but only advised based on the information provided to me. Primary provider is responsible for all clinical decisions. Pertinent history, physical exam and clinical findings and course discussed: Leticia Pope is a 47y.o. year old female who presents with intentional overdose of cyclobenzaprine. Patient past medical history is significant for schizoaffective disorder and previous intentional overdoses. Patient reports taking 41 x 10 mg of cyclobenzaprine 2 hours prior to arrival in the emergency department. Denies any other ingestion, the patient is noted to be both on valproate and bupropion. On arrival, patient has normal vital signs, no complaints, and no toxidrome on physical exam.    Review of systems and physical exam not performed by me.     Historical Information   Past Medical History:   Diagnosis Date    Anxiety     Arthritis     oa cassandra knees    Asthma     good control- no medications Yan's esophagus     Bipolar affective disorder (HCC)     Cannabis abuse with unspecified 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HCC)     Chronic pain disorder     lumbar    COPD (chronic obstructive pulmonary disease) (HCC)     CPAP (continuous positive airway pressure) dependence     Degenerative disc disease at L5-S1 level     Deliberate self-cutting     9/15/22per pt has not done recently-- does see a therapist and psychiatrist regularly    Depression 09/16/2008    Disease of thyroid gland     hypo    MARTINEZ (dyspnea on exertion)     per pt "has had this with exertion and not new"    Drug overdose 10/28/2008    suicide attempt and again drug overdose 7/2022-- [facility] floor and than transferred to [facility]    Dysphagia     Dyspnea     Edema     BLE    Family history of blood clots     GERD (gastroesophageal reflux disease)     Headache(784.0)     Headache, tension-type     Hemorrhage of rectum and anus 10/02/2017    Formatting of this note might be different from the original.  Added automatically from request for surgery [id number]    High blood pressure     History of COVID-19 12/30/2020    Symptoms started on 12/30/2020 and then got worse. Today she is feeling a little bit better.   She is a candidate for monoclonal antibody infusion and set for 1/6/21 @ 1pm at [facility]. 01/07/21 - telemedicine -     Hyperlipidemia     Hypertension     Knee pain, bilateral     Especially right    Medical marijuana use     Memory loss     Migraines     Obesity     Overactive bladder     Pulmonary emboli (HCC)     Sjogren's disease ([address])     Sleep apnea     Stress incontinence     Suicidal ideations     Teeth missing     Tremor     per pt Tremors of Right Leg and both Arms    Visual impairment     Wears glasses      Past Surgical History:   Procedure Laterality Date    BREAST CYST EXCISION Right 1989    CARPAL TUNNEL RELEASE Left     CHOLECYSTECTOMY  05/2003    Laparoscopic    COLONOSCOPY      01/12/2009    DILATION AND CURETTAGE OF UTERUS      ELBOW SURGERY Left     x2. No hardware    ESOPHAGOGASTRODUODENOSCOPY      FOOT SURGERY Left     Plantar fasciotomy    HERNIA REPAIR      HYSTERECTOMY      laporoscopic, partial    KNEE ARTHROSCOPY Bilateral     OOPHORECTOMY Left 10/2015    CT GASTROCNEMIUS RECESSION Left 2021    Procedure: RECESSION GASTROC OPEN;  Surgeon: Mandy Meza DPM;  Location: [address] [place] [address] MAIN OR;  Service: Podiatry    CT RPR UMBILICAL HRNA 5 YRS/> REDUCIBLE N/A 2019    Procedure: REPAIR HERNIA UMBILICAL LAPAROSCOPIC W/ ROBOTICS;  Surgeon: Humphrey Riec MD;  Location: [facility] Main OR;  Service: General    CT SPHINCTEROTOMY ANAL DIVISION SPHINCTER SPX N/A 2018    Procedure: EUA, LEFT PARTIAL INTERNAL SPHINCTEROTOMY;  Surgeon: Gabriela Garcia MD;  Location: [address] [place] [address] MAIN OR;  Service: Colorectal    CT TNOT ELBOW LATERAL/MEDIAL DEBRIDE OPEN TDN RPR Left 2022    Procedure: RELEASE EPICONDYLAR ELBOW MEDIAL;  Surgeon: James Villafuerte MD;  Location[facility] MAIN OR;  Service: Orthopedics    REDUCTION MAMMAPLASTY Bilateral 1999    REPAIR LACERATION Left     left hand-2009    REPLACEMENT TOTAL KNEE Right     ROTATOR CUFF REPAIR Left     TONSILECTOMY AND ADNOIDECTOMY      US GUIDED MSK PROCEDURE  2021    VASCULAR SURGERY      VEIN LIGATION Bilateral     WISDOM TOOTH EXTRACTION       Social History   Social History     Substance and Sexual Activity   Alcohol Use Not Currently    Comment: Recovering alcoholic     Social History     Substance and Sexual Activity   Drug Use Yes    Types: Marijuana, Methamphetamines    Comment: once month off meth     Social History     Tobacco Use   Smoking Status Former    Packs/day: 2.00    Years: 30.00    Total pack years: 60.00    Types: Cigarettes    Start date: 1985    Quit date: 2018    Years since quittin.8   Smokeless Tobacco Never   Tobacco Comments    Started at age 14.      Family History   Problem Relation Age of Onset    Kidney cancer Mother     Diabetes Mother Depression Mother     Stroke Mother     Arthritis Mother     Cancer Mother     Psychiatric Illness Mother     No Known Problems Father     No Known Problems Sister     No Known Problems Maternal Grandmother     No Known Problems Maternal Grandfather     No Known Problems Paternal Grandmother     No Known Problems Paternal Grandfather     Colon cancer Maternal Uncle     Colon cancer Maternal Uncle     Colon cancer Paternal Uncle     Colon cancer Family     Alcohol abuse Sister     Asthma Sister         Prior to Admission medications    Medication Sig Start Date End Date Taking?  Authorizing Provider   albuterol (Ventolin HFA) 90 mcg/act inhaler Inhale 2 puffs every 6 (six) hours as needed for wheezing  Patient not taking: Reported on 11/1/2023 9/15/23   Kristian Gonzalez,    amLODIPine (NORVASC) 5 mg tablet TAKE 1 TABLET (5 MG TOTAL) BY MOUTH DAILY 7/12/23   JHONATAN Michael   atoMOXetine (STRATTERA) 40 mg capsule  10/13/23   Historical Provider, MD   buPROPion (WELLBUTRIN XL) 300 mg 24 hr tablet Take 1 tablet (300 mg total) by mouth daily 10/6/23 11/5/23  Atif Ocampo DO   cholecalciferol (VITAMIN D3) 1,000 units tablet Take 1 tablet (1,000 Units total) by mouth daily 10/6/23 11/5/23  Ava Russell DO   cloNIDine (CATAPRES) 0.1 mg tablet Take 1 tablet (0.1 mg total) by mouth every 12 (twelve) hours 10/6/23 11/5/23  Eurjob Russell DO   Diclofenac Sodium (VOLTAREN) 1 % Apply 2 g topically 4 (four) times a day  Patient not taking: Reported on 10/24/2023 7/17/23   JHONATAN Michael   divalproex sodium (DEPAKOTE) 250 mg DR tablet Take 3 tablets (750 mg total) by mouth in the morning 10/6/23 11/5/23  Ava Russell DO   divalproex sodium (DEPAKOTE) 500 mg DR tablet Take 2 tablets (1,000 mg total) by mouth daily at bedtime 10/6/23 11/5/23  Atif Ocampo DO   estradiol (ESTRACE) 0.5 MG tablet Take 1 tablet (0.5 mg total) by mouth daily 5/4/23   Herman Ferguson DO   furosemide (LASIX) 40 mg tablet Take 1 tablet (40 mg total) by mouth daily 10/18/23   JHONATAN Arevalo   haloperidol decanoate (Haldol Decanoate) 50 mg/mL injection every 30 (thirty) days 8/3/22   Historical Provider, MD   hydrocortisone 2.5 % ointment Apply topically 2 (two) times a day  Patient not taking: Reported on 11/3/2023 10/25/22   JHONATAN Denise   levothyroxine (Euthyrox) 125 mcg tablet Take 1 tablet (125 mcg total) by mouth daily in the early morning 10/6/23 11/5/23  Karina Russell DO   lidocaine (LIDODERM) 5 % Apply 1 patch topically over 12 hours daily Remove & Discard patch within 12 hours or as directed by MD 10/6/23   Vinod Lua DO   losartan (COZAAR) 50 mg tablet TAKE ONE TABLET BY MOUTH DAILY 3/29/23   JHONATAN Denise   lurasidone (LATUDA) 40 mg tablet Take 1 tablet (40 mg total) by mouth daily with breakfast 10/6/23 11/5/23  Karina Russell DO   lurasidone (LATUDA) 40 mg tablet Take 1 tablet (40 mg total) by mouth daily with dinner 10/6/23 11/5/23  Karina Russell DO   memantine (NAMENDA) 10 mg tablet Take 1 tablet (10 mg total) by mouth daily 10/6/23 11/5/23  Kairna Russell DO   metoprolol tartrate (LOPRESSOR) 25 mg tablet TAKE ONE TABLET BY MOUTH EVERY 12 HOURS 10/18/23   JHONATAN Arevalo   naltrexone (REVIA) 50 mg tablet Take 1 tablet (50 mg total) by mouth daily 10/6/23 11/5/23  Karina Russell DO   nystatin (MYCOSTATIN) powder Apply topically 4 (four) times a day for 5 days 11/3/23 11/8/23  Nahed Partida,    oxybutynin (DITROPAN) 5 mg tablet Take 1 tablet (5 mg total) by mouth 2 (two) times a day 10/6/23 11/5/23  Karina Russell DO   pantoprazole (PROTONIX) 20 mg tablet Take 1 tablet (20 mg total) by mouth daily in the early morning 10/6/23 11/5/23  Garry Russell DO   pilocarpine (SALAGEN) 5 mg tablet Take 5 mg by mouth 2 (two) times a day    Historical Provider, MD   RABEprazole (ACIPHEX) 20 MG tablet Take 20 mg by mouth 2 (two) times a day    Historical Provider, MD rOPINIRole (REQUIP) 1 mg tablet 1 mg 10/13/23   Historical Provider, MD   topiramate (TOPAMAX) 200 MG tablet Take 1 tablet (200 mg total) by mouth 2 (two) times a day 10/6/23 11/5/23  Pierce Severs, DO   trospium chloride (SANCTURA) 20 mg tablet Take 20 mg by mouth 2 (two) times a day 1/12/23 1/12/24  Historical Provider, MD   warfarin (Coumadin) 2 mg tablet Take as directed 10/30/23   JHONATAN Lee   warfarin (COUMADIN) 4 mg tablet Take 1 tablet (4 mg total) by mouth daily 10/23/23 11/22/23  JHONATAN Lee       No current facility-administered medications for this encounter. Allergies   Allergen Reactions    Percocet [Oxycodone-Acetaminophen] Headache     Severe headaches   (denies issues with Tylenol)    Povidone Iodine Rash     Unsure if betadine or gauze post surgical. Got rash on leg. Has  Had itchy rashes after every surgery prep and IV insertion    Medical Tape Hives    Chlorhexidine Rash     Per pt "able to use the liquid soap--thinkd reaction from the sponges or wipes"       Objective     No intake or output data in the 24 hours ending 11/06/23 0921    Invasive Devices:   Peripheral IV 11/06/23 Left Antecubital (Active)   Site Assessment WDL; Clean;Dry; Intact 11/06/23 0708   Dressing Type Transparent 11/06/23 0708   Line Status Blood return noted; Flushed 11/06/23 0708   Dressing Status Clean;Dry; Intact 11/06/23 0708       Vitals   Vitals:    11/06/23 0653   BP: 147/69   TempSrc: Oral   Pulse: 64   Resp: 16   Patient Position - Orthostatic VS: Sitting   Temp: 98.3 °F (36.8 °C)         EKG, Pathology, and/or Other Studies: I have personally reviewed pertinent reports. NSR 62, QRS 88, Qtc 401, no terminal R, no BHARGAV or STD    Lab Results: I have personally reviewed pertinent reports.       Labs:    Results from last 7 days   Lab Units 11/06/23  0710 11/04/23  1540   WBC Thousand/uL 10.36* 11.32*   HEMOGLOBIN g/dL 14.6 14.9   HEMATOCRIT % 45.2 46.7*   PLATELETS Thousands/uL 147* 173 NEUTROS PCT % 62 64   LYMPHS PCT % 26 25   MONOS PCT % 9 10   EOS PCT % 1 0      Results from last 7 days   Lab Units 11/06/23  0710   SODIUM mmol/L 139   POTASSIUM mmol/L 3.8   CHLORIDE mmol/L 107   CO2 mmol/L 23   BUN mg/dL 15   CREATININE mg/dL 0.73   CALCIUM mg/dL 9.0   ALK PHOS U/L 49   ALT U/L 11   AST U/L 12*      Results from last 7 days   Lab Units 11/06/23  0710 11/02/23  1002   INR  2.97* 2.89*   PTT seconds 50*  --          No results found for: "TROPONINI"  Results from last 7 days   Lab Units 11/06/23  0719   PH AMAURY  7.422*   PCO2 AMAURY mm Hg 26.9*   PO2 AMAURY mm Hg 68.4*   HCO3 AMAURY mmol/L 17.1*   O2 CONTENT AMAURY ml/dL 18.2   O2 HGB, VENOUS % 91.3*     Results from last 7 days   Lab Units 11/06/23  0710   ACETAMINOPHEN LVL ug/mL <10*   ETHANOL LVL mg/dL <81   SALICYLATE LVL mg/dL <5     Invalid input(s): "EXTPREGUR"      Imaging Studies: I have personally reviewed pertinent reports. Counseling / Coordination of Care  Total time spent today 35 minutes. This was a phone consultation.

## 2023-11-06 NOTE — ASSESSMENT & PLAN NOTE
Patient was brought in after taking 40 tablets of flexeril 10 mg; intentional self harm  Toxicology was consulted- per their consult, watch for anticholinergic affects, such as tachycardia, dilated pupils, and delirium as well as decreased alertness and sedation.    1:1 observation and psych consult-defer to them regarding her home psych meds  Monitor for prolonged QTc, continue tele  Monitor for seizures

## 2023-11-06 NOTE — ED PROVIDER NOTES
History  Chief Complaint   Patient presents with    Overdose - Intentional     Arrives via EMS with report of having taken 41 pills of flexeril approx 75 mins pta. When asked if she was trying to end her life by taking the medication patient states "sort of." Patient responds "a little bit" to question of whether she is still having any suicidal thoughts at this time. 59-year-old female with history of schizoaffective disorder with prior intentional overdoses in the past presents to the emergency department for evaluation after an intentional overdose. The patient states that approximately 2 hours prior to arrival she took 41 10 mg Flexeril pills. She states that she knows it was this amount because she counted them. She states that she did this because she did not want to live anymore. She denies ingesting any other medication or illicit substances. She states that she called her ACT team about an hour after the ingestion who called for an ambulance. She states that she is feels tired but otherwise has no complaints. She denies headache, blurry vision, chest pain, shortness of breath, abdominal pain, fever, chills, nausea, vomiting and diarrhea. Prior to Admission Medications   Prescriptions Last Dose Informant Patient Reported? Taking?    Diclofenac Sodium (VOLTAREN) 1 %  Self No No   Sig: Apply 2 g topically 4 (four) times a day   Patient not taking: Reported on 10/24/2023   RABEprazole (ACIPHEX) 20 MG tablet  Self Yes No   Sig: Take 20 mg by mouth 2 (two) times a day   albuterol (Ventolin HFA) 90 mcg/act inhaler  Self No No   Sig: Inhale 2 puffs every 6 (six) hours as needed for wheezing   Patient not taking: Reported on 11/1/2023   amLODIPine (NORVASC) 5 mg tablet  Self No No   Sig: TAKE 1 TABLET (5 MG TOTAL) BY MOUTH DAILY   atoMOXetine (STRATTERA) 40 mg capsule  Self Yes No   Patient not taking: Reported on 11/3/2023   buPROPion (WELLBUTRIN XL) 300 mg 24 hr tablet  Self No No   Sig: Take 1 tablet (300 mg total) by mouth daily   cholecalciferol (VITAMIN D3) 1,000 units tablet  Self No No   Sig: Take 1 tablet (1,000 Units total) by mouth daily   cloNIDine (CATAPRES) 0.1 mg tablet  Self No No   Sig: Take 1 tablet (0.1 mg total) by mouth every 12 (twelve) hours   divalproex sodium (DEPAKOTE) 250 mg DR tablet  Self No No   Sig: Take 3 tablets (750 mg total) by mouth in the morning   divalproex sodium (DEPAKOTE) 500 mg DR tablet  Self No No   Sig: Take 2 tablets (1,000 mg total) by mouth daily at bedtime   estradiol (ESTRACE) 0.5 MG tablet  Self No No   Sig: Take 1 tablet (0.5 mg total) by mouth daily   furosemide (LASIX) 40 mg tablet   No No   Sig: Take 1 tablet (40 mg total) by mouth daily   haloperidol decanoate (Haldol Decanoate) 50 mg/mL injection  Self Yes No   Sig: every 30 (thirty) days   hydrocortisone 2.5 % ointment  Self No No   Sig: Apply topically 2 (two) times a day   Patient not taking: Reported on 11/3/2023   levothyroxine (Euthyrox) 125 mcg tablet  Self No No   Sig: Take 1 tablet (125 mcg total) by mouth daily in the early morning   lidocaine (LIDODERM) 5 %  Self No No   Sig: Apply 1 patch topically over 12 hours daily Remove & Discard patch within 12 hours or as directed by MD   losartan (COZAAR) 50 mg tablet  Self No No   Sig: TAKE ONE TABLET BY MOUTH DAILY   lurasidone (LATUDA) 40 mg tablet  Self No No   Sig: Take 1 tablet (40 mg total) by mouth daily with breakfast   lurasidone (LATUDA) 40 mg tablet  Self No No   Sig: Take 1 tablet (40 mg total) by mouth daily with dinner   memantine (NAMENDA) 10 mg tablet  Self No No   Sig: Take 1 tablet (10 mg total) by mouth daily   metoprolol tartrate (LOPRESSOR) 25 mg tablet   No No   Sig: TAKE ONE TABLET BY MOUTH EVERY 12 HOURS   naltrexone (REVIA) 50 mg tablet  Self No No   Sig: Take 1 tablet (50 mg total) by mouth daily   nystatin (MYCOSTATIN) powder   No No   Sig: Apply topically 4 (four) times a day for 5 days   oxybutynin (DITROPAN) 5 mg tablet  Self No No   Sig: Take 1 tablet (5 mg total) by mouth 2 (two) times a day   pantoprazole (PROTONIX) 20 mg tablet  Self No No   Sig: Take 1 tablet (20 mg total) by mouth daily in the early morning   pilocarpine (SALAGEN) 5 mg tablet  Self Yes No   Sig: Take 5 mg by mouth 2 (two) times a day   rOPINIRole (REQUIP) 1 mg tablet  Self Yes No   Si mg   topiramate (TOPAMAX) 200 MG tablet  Self No No   Sig: Take 1 tablet (200 mg total) by mouth 2 (two) times a day   trospium chloride (SANCTURA) 20 mg tablet  Self Yes No   Sig: Take 20 mg by mouth 2 (two) times a day   warfarin (COUMADIN) 4 mg tablet  Self No No   Sig: Take 1 tablet (4 mg total) by mouth daily   warfarin (Coumadin) 2 mg tablet   No No   Sig: Take as directed      Facility-Administered Medications: None       Past Medical History:   Diagnosis Date    Anxiety     Arthritis     oa cassandra knees    Asthma     good control- no medications    Yan's esophagus     Bipolar affective disorder (HCC)     Cannabis abuse with unspecified 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HCC)     Chronic pain disorder     lumbar    COPD (chronic obstructive pulmonary disease) (HCC)     CPAP (continuous positive airway pressure) dependence     Degenerative disc disease at L5-S1 level     Deliberate self-cutting     9/15/22per pt has not done recently-- does see a therapist and psychiatrist regularly    Depression 2008    Disease of thyroid gland     hypo    MARTINEZ (dyspnea on exertion)     per pt "has had this with exertion and not new"    Drug overdose 10/28/2008    suicide attempt and again drug overdose 2022-- [facility] floor and than transferred to [facility]    Dysphagia     Dyspnea     Edema     BLE    Family history of blood clots     GERD (gastroesophageal reflux disease)     Headache(784.0)     Headache, tension-type     Hemorrhage of rectum and anus 10/02/2017    Formatting of this note might be different from the original.  Added automatically from request for surgery [id number]    High blood pressure     History of COVID-19 12/30/2020    Symptoms started on 12/30/2020 and then got worse. Today she is feeling a little bit better. She is a candidate for monoclonal antibody infusion and set for 1/6/21 @ 1pm at [facility]. 01/07/21 - telemedicine -     Hyperlipidemia     Hypertension     Knee pain, bilateral     Especially right    Medical marijuana use     Memory loss     Migraines     Obesity     Overactive bladder     Pulmonary emboli (HCC)     Sjogren's disease ([address])     Sleep apnea     Stress incontinence     Suicidal ideations     Teeth missing     Tremor     per pt Tremors of Right Leg and both Arms    Visual impairment     Wears glasses        Past Surgical History:   Procedure Laterality Date    BREAST CYST EXCISION Right 1989    CARPAL TUNNEL RELEASE Left     CHOLECYSTECTOMY  05/2003    Laparoscopic    COLONOSCOPY      01/12/2009    DILATION AND CURETTAGE OF UTERUS      ELBOW SURGERY Left     x2.  No hardware    ESOPHAGOGASTRODUODENOSCOPY      FOOT SURGERY Left     Plantar fasciotomy    HERNIA REPAIR      HYSTERECTOMY      laporoscopic, partial    KNEE ARTHROSCOPY Bilateral     OOPHORECTOMY Left 10/2015    VA GASTROCNEMIUS RECESSION Left 02/24/2021    Procedure: RECESSION GASTROC OPEN;  Surgeon: Katy Geronimo DPM;  Location: [address] [place] [address] MAIN OR;  Service: Podiatry    VA RPR UMBILICAL HRNA 5 YRS/> REDUCIBLE N/A 04/24/2019    Procedure: REPAIR HERNIA UMBILICAL LAPAROSCOPIC W/ ROBOTICS;  Surgeon: Lillian Read MD;  Location: [facility] Main OR;  Service: General    VA SPHINCTEROTOMY ANAL DIVISION SPHINCTER [facility] N/A 08/31/2018    Procedure: EUA, LEFT PARTIAL INTERNAL SPHINCTEROTOMY;  Surgeon: Eliecer Alvarez MD;  Location: [address] [place] [address] MAIN OR;  Service: Colorectal    VA TNOT ELBOW LATERAL/MEDIAL DEBRIDE OPEN TDN RPR Left 09/20/2022    Procedure: RELEASE EPICONDYLAR ELBOW MEDIAL;  Surgeon: Susie Bender MD;  Location: [facility] MAIN OR;  Service: [address] [place] [address] Bilateral 1999    REPAIR LACERATION Left     left hand-2009    REPLACEMENT TOTAL KNEE Right     ROTATOR CUFF REPAIR Left     TONSILECTOMY AND ADNOIDECTOMY      US GUIDED MSK PROCEDURE  2021    VASCULAR SURGERY      VEIN LIGATION Bilateral     WISDOM TOOTH EXTRACTION         Family History   Problem Relation Age of Onset    Kidney cancer Mother     Diabetes Mother     Depression Mother     Stroke Mother     Arthritis Mother     Cancer Mother     Psychiatric Illness Mother     No Known Problems Father     No Known Problems Sister     No Known Problems Maternal Grandmother     No Known Problems Maternal Grandfather     No Known Problems Paternal Grandmother     No Known Problems Paternal Grandfather     Colon cancer Maternal Uncle     Colon cancer Maternal Uncle     Colon cancer Paternal Uncle     Colon cancer Family     Alcohol abuse Sister     Asthma Sister      I have reviewed and agree with the history as documented. E-Cigarette/Vaping    E-Cigarette Use Current Some Day User     Comments medical THC      E-Cigarette/Vaping Substances    Nicotine No     THC Yes     CBD No     Flavoring No     Other No     Unknown No      Social History     Tobacco Use    Smoking status: Former     Packs/day: 2.00     Years: 30.00     Total pack years: 60.00     Types: Cigarettes     Start date: 1985     Quit date: 2018     Years since quittin.8    Smokeless tobacco: Never    Tobacco comments:     Started at age 14. Vaping Use    Vaping Use: Some days    Substances: THC   Substance Use Topics    Alcohol use: Not Currently     Comment: Recovering alcoholic    Drug use: Yes     Types: Marijuana, Methamphetamines     Comment: once month off meth       Review of Systems   Constitutional:  Positive for fatigue. Negative for chills and fever. HENT:  Negative for ear pain and sore throat. Eyes:  Negative for pain and visual disturbance. Respiratory:  Negative for cough and shortness of breath. Cardiovascular:  Negative for chest pain and palpitations. Gastrointestinal:  Negative for abdominal pain and vomiting. Genitourinary:  Negative for dysuria and hematuria. Musculoskeletal:  Negative for arthralgias and back pain. Skin:  Negative for color change and rash. Neurological:  Negative for seizures and syncope. Psychiatric/Behavioral:  Positive for self-injury and suicidal ideas. All other systems reviewed and are negative. Physical Exam  Physical Exam  Vitals and nursing note reviewed. Constitutional:       General: She is not in acute distress. Appearance: She is well-developed. She is obese. HENT:      Head: Normocephalic and atraumatic. Eyes:      Extraocular Movements: Extraocular movements intact. Conjunctiva/sclera: Conjunctivae normal.      Pupils: Pupils are equal, round, and reactive to light. Cardiovascular:      Rate and Rhythm: Normal rate and regular rhythm. Heart sounds: No murmur heard. Pulmonary:      Effort: Pulmonary effort is normal. No respiratory distress. Breath sounds: Normal breath sounds. Abdominal:      Palpations: Abdomen is soft. Tenderness: There is no abdominal tenderness. Musculoskeletal:         General: No swelling. Cervical back: Neck supple. Skin:     General: Skin is warm and dry. Capillary Refill: Capillary refill takes less than 2 seconds. Neurological:      Mental Status: She is alert and oriented to person, place, and time. Cranial Nerves: Cranial nerves 2-12 are intact. Sensory: Sensation is intact. Motor: Motor function is intact. Coordination: Coordination is intact. Psychiatric:         Attention and Perception: She does not perceive auditory or visual hallucinations. Mood and Affect: Mood normal. Affect is flat. Thought Content: Thought content includes suicidal ideation. Thought content does not include homicidal ideation.  Thought content includes suicidal plan. Thought content does not include homicidal plan.          Vital Signs  ED Triage Vitals [11/06/23 0653]   Temperature Pulse Respirations Blood Pressure SpO2   98.3 °F (36.8 °C) 64 16 147/69 97 %      Temp Source Heart Rate Source Patient Position - Orthostatic VS BP Location FiO2 (%)   Oral Monitor Sitting Left arm --      Pain Score       No Pain           Vitals:    11/06/23 1030 11/06/23 1145 11/06/23 1156 11/06/23 1308   BP: 140/78  163/87 119/63   Pulse: 66 67 68 67   Patient Position - Orthostatic VS:   Sitting Lying         Visual Acuity      ED Medications  Medications   furosemide (LASIX) tablet 40 mg (has no administration in time range)   estradiol (ESTRACE) tablet 0.5 mg (has no administration in time range)   amLODIPine (NORVASC) tablet 5 mg (has no administration in time range)   cholecalciferol (VITAMIN D3) tablet 1,000 Units (has no administration in time range)   cloNIDine (CATAPRES) tablet 0.1 mg (has no administration in time range)   divalproex sodium (DEPAKOTE) DR tablet 750 mg (has no administration in time range)   divalproex sodium (DEPAKOTE) DR tablet 1,000 mg (has no administration in time range)   levothyroxine tablet 125 mcg (has no administration in time range)   nystatin (MYCOSTATIN) powder (has no administration in time range)   naltrexone (REVIA) tablet 50 mg (has no administration in time range)   topiramate (TOPAMAX) tablet 200 mg (has no administration in time range)   warfarin (COUMADIN) tablet 4 mg (has no administration in time range)   pantoprazole (PROTONIX) EC tablet 20 mg (has no administration in time range)   losartan (COZAAR) tablet 50 mg (has no administration in time range)   LORazepam (ATIVAN) injection 1 mg (has no administration in time range)       Diagnostic Studies  Results Reviewed       Procedure Component Value Units Date/Time    T4, free [[id number]]  (Normal) Collected: 11/06/23 0710    Lab Status: Final result Specimen: Blood from Arm, Left Updated: 11/06/23 1129     Free T4 0.81 ng/dL     Narrative:        "Therapeutic range for patients medicated with thyroid disorders: 0.61-1.24 ng/dL."    Ammonia [[id number]]  (Normal) Collected: 11/06/23 0956    Lab Status: Final result Specimen: Blood from Arm, Left Updated: 11/06/23 1026     Ammonia 63 umol/L     Valproic acid level, total [[id number]]  (Normal) Collected: 11/06/23 0710    Lab Status: Final result Specimen: Blood from Arm, Left Updated: 11/06/23 1023     Valproic Acid, Total 58 ug/mL     Rapid drug screen, urine [[id number]]  (Abnormal) Collected: 11/06/23 0845    Lab Status: Final result Specimen: Urine, Clean Catch Updated: 11/06/23 0915     Amph/Meth UR Negative     Barbiturate Ur Negative     Benzodiazepine Urine Negative     Cocaine Urine Negative     Methadone Urine Negative     Opiate Urine Negative     PCP Ur Negative     THC Urine Positive     Oxycodone Urine Negative    Narrative:      Presumptive report. If requested, specimen will be sent to reference lab for confirmation. FOR MEDICAL PURPOSES ONLY. IF CONFIRMATION NEEDED PLEASE CONTACT THE LAB WITHIN 5 DAYS.     Drug Screen Cutoff Levels:  AMPHETAMINE/METHAMPHETAMINES  1000 ng/mL  BARBITURATES     200 ng/mL  BENZODIAZEPINES     200 ng/mL  COCAINE      300 ng/mL  METHADONE      300 ng/mL  OPIATES      300 ng/mL  PHENCYCLIDINE     25 ng/mL  THC       50 ng/mL  OXYCODONE      100 ng/mL    UA (URINE) with reflex to Scope [[id number]]  (Abnormal) Collected: 11/06/23 0845    Lab Status: Final result Specimen: Urine, Clean Catch Updated: 11/06/23 0851     Color, UA Yellow     Clarity, UA Slightly Cloudy     Specific Gravity, UA 1.020     pH, UA 7.5     Leukocytes, UA Negative     Nitrite, UA Negative     Protein, UA Negative mg/dl      Glucose, UA Negative mg/dl      Ketones, UA Negative mg/dl      Urobilinogen, UA 0.2 E.U./dl      Bilirubin, UA Negative     Occult Blood, UA Negative    TSH, 3rd generation with Free T4 reflex [[id number]] (Abnormal) Collected: 11/06/23 0710    Lab Status: Final result Specimen: Blood from Arm, Left Updated: 11/06/23 0752     TSH 3RD GENERATON 8.652 uIU/mL     Pregnancy Test (HCG Qualitative) [[id number]]  (Normal) Collected: 11/06/23 0710    Lab Status: Final result Specimen: Blood from Arm, Left Updated: 11/06/23 0745     Preg, Serum Negative    Salicylate level [[id number]]  (Normal) Collected: 11/06/23 0710    Lab Status: Final result Specimen: Blood from Arm, Left Updated: 77/94/03 3291     Salicylate Lvl <5 mg/dL     Acetaminophen level-If concentration is detectable, please discuss with medical  on call.  [[id number]]  (Abnormal) Collected: 11/06/23 0710    Lab Status: Final result Specimen: Blood from Arm, Left Updated: 11/06/23 0740     Acetaminophen Level <10 ug/mL     Ethanol [[id number]]  (Normal) Collected: 11/06/23 0710    Lab Status: Final result Specimen: Blood from Arm, Left Updated: 11/06/23 0740     Ethanol Lvl <10 mg/dL     Comprehensive metabolic panel [[id number]]  (Abnormal) Collected: 11/06/23 0710    Lab Status: Final result Specimen: Blood from Arm, Left Updated: 11/06/23 0740     Sodium 139 mmol/L      Potassium 3.8 mmol/L      Chloride 107 mmol/L      CO2 23 mmol/L      ANION GAP 9 mmol/L      BUN 15 mg/dL      Creatinine 0.73 mg/dL      Glucose 95 mg/dL      Calcium 9.0 mg/dL      AST 12 U/L      ALT 11 U/L      Alkaline Phosphatase 49 U/L      Total Protein 7.2 g/dL      Albumin 4.0 g/dL      Total Bilirubin 0.23 mg/dL      eGFR 94 ml/min/1.73sq m     Narrative:      [place] guidelines for Chronic Kidney Disease (CKD):     Stage 1 with normal or high GFR (GFR > 90 mL/min/1.73 square meters)    Stage 2 Mild CKD (GFR = 60-89 mL/min/1.73 square meters)    Stage 3A Moderate CKD (GFR = 45-59 mL/min/1.73 square meters)    Stage 3B Moderate CKD (GFR = 30-44 mL/min/1.73 square meters)    Stage 4 Severe CKD (GFR = 15-29 mL/min/1.73 square meters)    Stage 5 End Stage CKD (GFR <15 mL/min/1.73 square meters)  Note: GFR calculation is accurate only with a steady state creatinine    Protime-INR [[id number]]  (Abnormal) Collected: 11/06/23 0710    Lab Status: Final result Specimen: Blood from Arm, Left Updated: 11/06/23 0730     Protime 30.3 seconds      INR 2.97    APTT [[id number]]  (Abnormal) Collected: 11/06/23 0710    Lab Status: Final result Specimen: Blood from Arm, Left Updated: 11/06/23 0730     PTT 50 seconds     Blood gas, venous [[id number]]  (Abnormal) Collected: 11/06/23 0719    Lab Status: Final result Specimen: Blood from Arm, Left Updated: 11/06/23 0727     pH, [place] 7.422     pCO2, [place] 26.9 mm Hg      pO2, [place] 68.4 mm Hg      HCO3, [place] 17.1 mmol/L      Base Excess, [place] -5.6 mmol/L      O2 Content, [place] 18.2 ml/dL      O2 HGB, VENOUS 91.3 %     CBC and differential [[id number]]  (Abnormal) Collected: 11/06/23 0710    Lab Status: Final result Specimen: Blood from Arm, Left Updated: 11/06/23 0719     WBC 10.36 Thousand/uL      RBC 4.87 Million/uL      Hemoglobin 14.6 g/dL      Hematocrit 45.2 %      MCV 93 fL      MCH 30.0 pg      MCHC 32.3 g/dL      RDW 14.1 %      MPV 10.0 fL      Platelets 453 Thousands/uL      nRBC 0 /100 WBCs      Neutrophils Relative 62 %      Immat GRANS % 1 %      Lymphocytes Relative 26 %      Monocytes Relative 9 %      Eosinophils Relative 1 %      Basophils Relative 1 %      Neutrophils Absolute 6.52 Thousands/µL      Immature Grans Absolute 0.07 Thousand/uL      Lymphocytes Absolute 2.70 Thousands/µL      Monocytes Absolute 0.93 Thousand/µL      Eosinophils Absolute 0.09 Thousand/µL      Basophils Absolute 0.05 Thousands/µL                    No orders to display              Procedures  ECG 12 Lead Documentation Only    Date/Time: 11/6/2023 7:15 AM    Performed by: Kaelyn Davis MD  Authorized by: Kaelyn Davis MD    ECG reviewed by me, the ED Provider: yes    Patient location:  ED  Previous ECG:     Previous ECG: Unavailable    Comparison to cardiac monitor: Yes    Interpretation:     Interpretation: normal    Rate:     ECG rate assessment: normal    Rhythm:     Rhythm: sinus rhythm    Ectopy:     Ectopy: none    QRS:     QRS axis:  Normal  Conduction:     Conduction: normal    ST segments:     ST segments:  Normal  T waves:     T waves: normal    CriticalCare Time    Date/Time: 11/6/2023 2:44 PM    Performed by: Miguel Angel Mccoy MD  Authorized by: Miguel Angel Mccoy MD    Critical care provider statement:     Critical care time (minutes):  35    Critical care start time:  11/6/2023 7:15 AM    Critical care end time:  11/6/2023 7:50 AM    Critical care time was exclusive of:  Separately billable procedures and treating other patients and teaching time    Critical care was necessary to treat or prevent imminent or life-threatening deterioration of the following conditions:  Toxidrome    Critical care was time spent personally by me on the following activities:  Obtaining history from patient or surrogate, development of treatment plan with patient or surrogate, discussions with consultants, evaluation of patient's response to treatment, examination of patient, ordering and performing treatments and interventions, ordering and review of laboratory studies, re-evaluation of patient's condition and review of old charts    I assumed direction of critical care for this patient from another provider in my specialty: no    ECG 12 Lead Documentation Only    Date/Time: 11/6/2023 11:35 AM    Performed by: Miguel Angel Mccoy MD  Authorized by: Miguel Angel Mccoy MD    ECG reviewed by me, the ED Provider: yes    Patient location:  ED  Previous ECG:     Previous ECG:  Compared to current    Similarity:  No change    Comparison to cardiac monitor: Yes    Interpretation:     Interpretation: normal    Rate:     ECG rate assessment: normal    Rhythm:     Rhythm: sinus rhythm    Ectopy:     Ectopy: none    QRS:     QRS axis: Normal  Conduction:     Conduction: normal    ST segments:     ST segments:  Normal  T waves:     T waves: normal             ED Course  ED Course as of 11/06/23 1500   Mon Nov 06, 2023   0713 Discussed with poison control. Watch for 6 to 8 hours. Can give Ativan for agitation. Repeat EKG in 4 hours. Watch for qrs widening. If greater than 100 give bicarb   0848 Discussed with on-call  who agrees with recommendations from poison control. Biggest concern is anticholinergic, toxidrome and antihistamine sedation. Will observe here in the ER and get a repeat EKG at the 4-hour [place] to evaluate for QRS widening. SBIRT 20yo+      Flowsheet Row Most Recent Value   Initial Alcohol Screen: US AUDIT-C     1. How often do you have a drink containing alcohol? 0 Filed at: 11/06/2023 0656   Audit-C Score 0 Filed at: 11/06/2023 4359   OLEG: How many times in the past year have you. .. Used an illegal drug or used a prescription medication for non-medical reasons? Once or Twice Filed at: 11/06/2023 2123                      Medical Decision Making  50-year-old female presented to the emergency department for evaluation after an intentional overdose on Flexeril. On arrival the patient was awake, alert, and oriented. Vital signs are within normal limits. Case was discussed with poison control and recommendations followed. The case was also later discussed with the on-call  and as the patient is on bupropion XL she is at risk for delayed seizure. Patient was closely monitored and 4-hour repeat EKG with no QRS widening. Patient will be admitted for close observation. She will need to be continued on one-to-one observation and will need a psychiatric evaluation when medically cleared. The case was discussed with the admitting team and the patient was admitted to the hospital for further treatment and evaluation.     Amount and/or Complexity of Data Reviewed  External Data Reviewed: labs, ECG and notes. Details: Prior labs, EKG and notes reviewed for comparison. Labs: ordered. ECG/medicine tests: ordered and independent interpretation performed. Discussion of management or test interpretation with external provider(s): Discussed with [facility] as well as the on-call .    Risk  Decision regarding hospitalization. Disposition  Final diagnoses:   Intentional overdose, initial encounter ([address])   Suicide attempt [facility])     Time reflects when diagnosis was documented in both MDM as applicable and the Disposition within this note       Time User Action Codes Description Comment    11/6/2023  7:07 AM Ane Lingo Add [T50.902A] Intentional overdose, initial encounter [facility])     11/6/2023 11:32 AM Ane Lingo Add [T14.91XA] Suicide attempt [facility])           ED Disposition       ED Disposition   Admit    Condition   Stable    Date/Time   Mon Nov 6, 2023 1132    Comment   Case was discussed with vivian and the patient's admission status was agreed to be Admission Status: observation status to the service of Dr. Edie Em . Follow-up Information    None         Current Discharge Medication List        CONTINUE these medications which have NOT CHANGED    Details   albuterol (Ventolin HFA) 90 mcg/act inhaler Inhale 2 puffs every 6 (six) hours as needed for wheezing  Qty: 18 g, Refills: 5    Comments: Substitution to a formulary equivalent within the same pharmaceutical class is authorized.   Associated Diagnoses: Chronic obstructive pulmonary disease, unspecified COPD type (HCC)      amLODIPine (NORVASC) 5 mg tablet TAKE 1 TABLET (5 MG TOTAL) BY MOUTH DAILY  Qty: 90 tablet, Refills: 3    Associated Diagnoses: Hypertension, unspecified type      atoMOXetine (STRATTERA) 40 mg capsule       buPROPion (WELLBUTRIN XL) 300 mg 24 hr tablet Take 1 tablet (300 mg total) by mouth daily  Qty: 30 tablet, Refills: 0    Associated Diagnoses: Schizoaffective disorder, bipolar type (Formerly [facility])      cholecalciferol (VITAMIN D3) 1,000 units tablet Take 1 tablet (1,000 Units total) by mouth daily  Qty: 30 tablet, Refills: 0    Associated Diagnoses: Vitamin D deficiency, unspecified      cloNIDine (CATAPRES) 0.1 mg tablet Take 1 tablet (0.1 mg total) by mouth every 12 (twelve) hours  Qty: 60 tablet, Refills: 0    Associated Diagnoses: Hypertension      Diclofenac Sodium (VOLTAREN) 1 % Apply 2 g topically 4 (four) times a day  Qty: 150 g, Refills: 0    Associated Diagnoses: Medial epicondylitis of right elbow      divalproex sodium (DEPAKOTE) 250 mg DR tablet Take 3 tablets (750 mg total) by mouth in the morning  Qty: 90 tablet, Refills: 0    Associated Diagnoses: Schizoaffective disorder, bipolar type ([facility])      divalproex sodium (DEPAKOTE) 500 mg DR tablet Take 2 tablets (1,000 mg total) by mouth daily at bedtime  Qty: 60 tablet, Refills: 0    Associated Diagnoses: Schizoaffective disorder, bipolar type ([facility])      estradiol (ESTRACE) 0.5 MG tablet Take 1 tablet (0.5 mg total) by mouth daily  Qty: 30 tablet, Refills: 4    Associated Diagnoses: Vasomotor symptoms due to menopause      furosemide (LASIX) 40 mg tablet Take 1 tablet (40 mg total) by mouth daily  Qty: 60 tablet, Refills: 0    Associated Diagnoses: Chronic obstructive pulmonary disease, unspecified COPD type ([facility])      haloperidol decanoate (Haldol Decanoate) 50 mg/mL injection every 30 (thirty) days      hydrocortisone 2.5 % ointment Apply topically 2 (two) times a day  Qty: 30 g, Refills: 0    Associated Diagnoses: Contact dermatitis, unspecified contact dermatitis type, unspecified trigger      levothyroxine (Euthyrox) 125 mcg tablet Take 1 tablet (125 mcg total) by mouth daily in the early morning  Qty: 30 tablet, Refills: 0    Associated Diagnoses: Acquired hypothyroidism      lidocaine (LIDODERM) 5 % Apply 1 patch topically over 12 hours daily Remove & Discard patch within 12 hours or as directed by MD  Refills: 0 Associated Diagnoses: Chronic low back pain      losartan (COZAAR) 50 mg tablet TAKE ONE TABLET BY MOUTH DAILY  Qty: 30 tablet, Refills: 9    Associated Diagnoses: Hypertension, unspecified type      lurasidone (LATUDA) 40 mg tablet Take 1 tablet (40 mg total) by mouth daily with breakfast  Qty: 30 tablet, Refills: 0    Associated Diagnoses: Schizoaffective disorder, bipolar type (HCC)      lurasidone (LATUDA) 40 mg tablet Take 1 tablet (40 mg total) by mouth daily with dinner  Qty: 30 tablet, Refills: 0    Associated Diagnoses: Schizoaffective disorder, bipolar type (HCC)      memantine (NAMENDA) 10 mg tablet Take 1 tablet (10 mg total) by mouth daily  Qty: 30 tablet, Refills: 0    Associated Diagnoses: Memory difficulties      metoprolol tartrate (LOPRESSOR) 25 mg tablet TAKE ONE TABLET BY MOUTH EVERY 12 HOURS  Qty: 180 tablet, Refills: 0    Associated Diagnoses: Hypertension, unspecified type      naltrexone (REVIA) 50 mg tablet Take 1 tablet (50 mg total) by mouth daily  Qty: 30 tablet, Refills: 0    Associated Diagnoses:  Morbid obesity with BMI of 45.0-49.9, adult ([facility])      nystatin (MYCOSTATIN) powder Apply topically 4 (four) times a day for 5 days  Qty: 60 g, Refills: 1    Associated Diagnoses: Candida infection of flexural skin      oxybutynin (DITROPAN) 5 mg tablet Take 1 tablet (5 mg total) by mouth 2 (two) times a day  Qty: 60 tablet, Refills: 0    Associated Diagnoses: Overactive bladder      pantoprazole (PROTONIX) 20 mg tablet Take 1 tablet (20 mg total) by mouth daily in the early morning  Qty: 30 tablet, Refills: 0    Associated Diagnoses: GERD (gastroesophageal reflux disease)      pilocarpine (SALAGEN) 5 mg tablet Take 5 mg by mouth 2 (two) times a day      RABEprazole (ACIPHEX) 20 MG tablet Take 20 mg by mouth 2 (two) times a day      rOPINIRole (REQUIP) 1 mg tablet 1 mg      topiramate (TOPAMAX) 200 MG tablet Take 1 tablet (200 mg total) by mouth 2 (two) times a day  Qty: 60 tablet, Refills: 0    Associated Diagnoses: Schizoaffective disorder, bipolar type (HCC)      trospium chloride (SANCTURA) 20 mg tablet Take 20 mg by mouth 2 (two) times a day      !! warfarin (Coumadin) 2 mg tablet Take as directed  Qty: 30 tablet, Refills: 2    Associated Diagnoses: History of pulmonary embolism; Acute saddle pulmonary embolism, unspecified whether acute cor pulmonale present ([address])      ! ! warfarin (COUMADIN) 4 mg tablet Take 1 tablet (4 mg total) by mouth daily  Qty: 30 tablet, Refills: 0    Associated Diagnoses: History of pulmonary embolism       !! - Potential duplicate medications found. Please discuss with provider. No discharge procedures on file.     PDMP Review         Value Time User    PDMP Reviewed  Yes 9/29/2023  8:09 AM Ella Swift MD            ED Provider  Electronically Signed by             Sushant Whaley MD  11/06/23 9320

## 2023-11-06 NOTE — PROGRESS NOTES
Outpatient Care Management Note:  In basket ER alert received X 2. Chart notes reviewed. Currently in ER for intentional OD. No call to patient at this time, will attempt outreach when appropriate. Transitions:   Type: Unplanned  Provider notification within 2 days of discharge  PCP: Kerline Banuelos  Notified via: ADT alert  Specialty Provider: [facility]  Notified via: notified by patient and ER  Other Care Team Member: RAIN Modi  Notified via: ADT alert  Collaboration with discharge team: reviewed HP IP/OP 1 [facility] [place], chart notes reviewed. Communication of changes to care plans to patient/caregiver:  Call to Teresa Quispe not appropriate at this time. Will contact when appropriate.

## 2023-11-06 NOTE — QUICK NOTE
I was asked to evaluate the patient by RN as patient was delirious, patient is sleepy, lethargic but arousable, answering simple questions. Pupils are equally reactive, able to maintain airway, heart rate of 70s, blood pressure stable 130s/70s, respiratory rate of 20, O2 saturation is 95% on room air, patient was resumed on Depakote and Topamax which will be held. Twelve-lead EKG was done which shows normal sinus rhythm QTc of 415ms. We will continue telemetry monitoring and close every 2 hours vitals check and will upgrade to stepdown 2. Will check venous blood gas.

## 2023-11-06 NOTE — TELEPHONE ENCOUNTER
Inaptient psych consult ordered and placed on the [facility] telepsych queue to be seen virtual by an [facility] provider.

## 2023-11-07 ENCOUNTER — PATIENT OUTREACH (OUTPATIENT)
Dept: CASE MANAGEMENT | Facility: OTHER | Age: 53
End: 2023-11-07

## 2023-11-07 LAB
ANION GAP SERPL CALCULATED.3IONS-SCNC: 11 MMOL/L
BUN SERPL-MCNC: 13 MG/DL (ref 5–25)
CALCIUM SERPL-MCNC: 9 MG/DL (ref 8.4–10.2)
CHLORIDE SERPL-SCNC: 108 MMOL/L (ref 96–108)
CO2 SERPL-SCNC: 21 MMOL/L (ref 21–32)
CREAT SERPL-MCNC: 0.57 MG/DL (ref 0.6–1.3)
GFR SERPL CREATININE-BSD FRML MDRD: 106 ML/MIN/1.73SQ M
GLUCOSE P FAST SERPL-MCNC: 100 MG/DL (ref 65–99)
GLUCOSE SERPL-MCNC: 100 MG/DL (ref 65–140)
INR PPP: 1.98 (ref 0.84–1.19)
POTASSIUM SERPL-SCNC: 3.8 MMOL/L (ref 3.5–5.3)
PROTHROMBIN TIME: 22.2 SECONDS (ref 11.6–14.5)
SODIUM SERPL-SCNC: 140 MMOL/L (ref 135–147)

## 2023-11-07 PROCEDURE — 99223 1ST HOSP IP/OBS HIGH 75: CPT | Performed by: PSYCHIATRY & NEUROLOGY

## 2023-11-07 PROCEDURE — 80048 BASIC METABOLIC PNL TOTAL CA: CPT | Performed by: PHYSICIAN ASSISTANT

## 2023-11-07 PROCEDURE — 93005 ELECTROCARDIOGRAM TRACING: CPT

## 2023-11-07 PROCEDURE — 85610 PROTHROMBIN TIME: CPT | Performed by: PHYSICIAN ASSISTANT

## 2023-11-07 PROCEDURE — 99232 SBSQ HOSP IP/OBS MODERATE 35: CPT | Performed by: PHYSICIAN ASSISTANT

## 2023-11-07 RX ORDER — LURASIDONE HYDROCHLORIDE 40 MG/1
40 TABLET, FILM COATED ORAL 2 TIMES DAILY
Status: DISCONTINUED | OUTPATIENT
Start: 2023-11-07 | End: 2023-11-10

## 2023-11-07 RX ORDER — LORAZEPAM 2 MG/ML
1 INJECTION INTRAMUSCULAR EVERY 6 HOURS PRN
Status: DISCONTINUED | OUTPATIENT
Start: 2023-11-07 | End: 2023-11-08

## 2023-11-07 RX ORDER — ATOMOXETINE 40 MG/1
40 CAPSULE ORAL DAILY
Status: DISCONTINUED | OUTPATIENT
Start: 2023-11-08 | End: 2023-11-10 | Stop reason: HOSPADM

## 2023-11-07 RX ORDER — BUPROPION HYDROCHLORIDE 150 MG/1
300 TABLET ORAL DAILY
Status: DISCONTINUED | OUTPATIENT
Start: 2023-11-08 | End: 2023-11-10 | Stop reason: HOSPADM

## 2023-11-07 RX ORDER — TOPIRAMATE 100 MG/1
200 TABLET, FILM COATED ORAL 2 TIMES DAILY
Status: DISCONTINUED | OUTPATIENT
Start: 2023-11-07 | End: 2023-11-10 | Stop reason: HOSPADM

## 2023-11-07 RX ADMIN — NYSTATIN: 100000 POWDER TOPICAL at 09:14

## 2023-11-07 RX ADMIN — LEVOTHYROXINE SODIUM 125 MCG: 125 TABLET ORAL at 05:06

## 2023-11-07 RX ADMIN — FUROSEMIDE 40 MG: 40 TABLET ORAL at 09:09

## 2023-11-07 RX ADMIN — SODIUM CHLORIDE, SODIUM LACTATE, POTASSIUM CHLORIDE, AND CALCIUM CHLORIDE 100 ML/HR: .6; .31; .03; .02 INJECTION, SOLUTION INTRAVENOUS at 05:07

## 2023-11-07 RX ADMIN — Medication 1000 UNITS: at 09:09

## 2023-11-07 RX ADMIN — TOPIRAMATE 200 MG: 100 TABLET ORAL at 17:13

## 2023-11-07 RX ADMIN — AMLODIPINE BESYLATE 5 MG: 5 TABLET ORAL at 09:09

## 2023-11-07 RX ADMIN — WARFARIN SODIUM 4 MG: 4 TABLET ORAL at 09:09

## 2023-11-07 RX ADMIN — CLONIDINE HYDROCHLORIDE 0.1 MG: 0.1 TABLET ORAL at 20:40

## 2023-11-07 RX ADMIN — PANTOPRAZOLE SODIUM 20 MG: 20 TABLET, DELAYED RELEASE ORAL at 05:06

## 2023-11-07 RX ADMIN — DIVALPROEX SODIUM 750 MG: 500 TABLET, DELAYED RELEASE ORAL at 09:09

## 2023-11-07 RX ADMIN — NYSTATIN: 100000 POWDER TOPICAL at 17:16

## 2023-11-07 RX ADMIN — LURASIDONE HYDROCHLORIDE 40 MG: 40 TABLET, FILM COATED ORAL at 20:40

## 2023-11-07 RX ADMIN — METOPROLOL TARTRATE 25 MG: 25 TABLET, FILM COATED ORAL at 20:40

## 2023-11-07 RX ADMIN — LORAZEPAM 1 MG: 2 INJECTION INTRAMUSCULAR; INTRAVENOUS at 18:53

## 2023-11-07 RX ADMIN — NALTREXONE HYDROCHLORIDE 50 MG: 50 TABLET, FILM COATED ORAL at 09:13

## 2023-11-07 RX ADMIN — CLONIDINE HYDROCHLORIDE 0.1 MG: 0.1 TABLET ORAL at 09:09

## 2023-11-07 RX ADMIN — LOSARTAN POTASSIUM 50 MG: 50 TABLET, FILM COATED ORAL at 09:09

## 2023-11-07 NOTE — PROGRESS NOTES
Welcome packet sent to patient with the following information via Mail:  Case management program brochure   Patient Rights and Responsibilities   Information on how to file a complaint and how complaints are handled.    Life planning information   Access to Care pamphlet
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
## 2023-11-07 NOTE — CASE MANAGEMENT
Case Management Assessment & Discharge Planning Note    Patient name Guy Falcon  Location /-87 MRN [id number]  : 1970 Date 2023       Current Admission Date: 2023  Current Admission Diagnosis:Intentional overdose [facility])   Patient Active Problem List    Diagnosis Date Noted    Candida infection of flexural skin 2023    Lymphedema 10/30/2023    Acute saddle pulmonary embolism ([address]) 10/16/2023    Acute deep vein thrombosis of lower leg, left ([address]) 10/16/2023    Polysubstance abuse ([address]) 2023    Restless leg syndrome 2023    Intentional overdose ([address]) 2023    Tremors of nervous system 2023    Contact dermatitis and eczema 09/15/2023    History of pulmonary embolism 2023    Elevated troponin 2023    Acute respiratory failure ([address]) 2023    Cervicalgia 08/10/2023    Medial epicondylitis of right elbow 2023    Bilateral leg edema 2023    Chronic migraine without aura without status migrainosus, not intractable 2023    Chronic eczematous otitis externa of both ears 2023    Intentional self-harm by unspecified sharp object, sequela ([address]) 2023    Suicidal deliberate poisoning ([address]) 2022    Knee pain, right 2022    Platelets decreased ([address]) 2022    GERD (gastroesophageal reflux disease) 2022    Diarrhea 2022    Ecchymosis 2022    Anxiety 2022    Borderline personality disorder ([address]) 2022    Contusion of elbow 2021    Cognitive impairment 10/07/2021    Substance abuse ([address]) 2021    Potential for cognitive impairment 2021    Mood disorder ([address]) 2021    Chronic tension-type headache, intractable 03/10/2021    History of COVID-19 2020    Memory difficulties 2020    BMI 45.0-49.9, adult ([address]) 2020    Mixed hyperlipidemia 10/30/2019    COPD (chronic obstructive pulmonary disease) ([address]) 2019    Major depressive disorder 05/23/2019    Sjogren's syndrome ([address]) 05/23/2019    Primary osteoarthritis of both knees 08/08/2018    MAG (obstructive sleep apnea) 04/24/2018    Medial epicondylitis of elbow, left 04/03/2018    Overactive bladder 06/08/2017    Schizoaffective disorder, bipolar type ([address]) 03/17/2017    Hypothyroidism 03/17/2017    Restrictive lung disease 02/01/2017    Family history of renal cancer 04/26/2016    Microhematuria 12/23/2015    Yan's esophagus determined by biopsy 10/19/2015    Medical clearance for psychiatric admission 09/14/2015    DDD (degenerative disc disease), lumbar 04/09/2015    Spondylosis of lumbosacral joint without myelopathy 04/09/2015    Urinary incontinence 03/31/2015    Benign essential hypertension 07/02/2014    Edema 03/26/2014    Chronic low back pain 04/07/2012    Hypertension 10/25/2008      LOS (days): 0  Geometric Mean LOS (GMLOS) (days):   Days to GMLOS:     OBJECTIVE:              Current admission status: Observation       Preferred Pharmacy:   [address] [place] [address], [address] [place] [address]  Phone: [phone] Fax: [phone]    Primary Care Provider: JHONATAN Orona    Primary Insurance: MEDICARE  Secondary Insurance: Iftikhar Swanson    ASSESSMENT:  Active Health Care Proxies    There are no active Health Care Proxies on file. Readmission Root Cause  30 Day Readmission: No    Patient Information  Admitted from[de-identified] Home  Mental Status: Alert  During Assessment patient was accompanied by: Not accompanied during assessment  Assessment information provided by[de-identified] Patient  Support Systems: Self, Therapist, Friends/neighbors, Parent  Home entry access options.  Select all that apply.: No steps to enter home  Type of Current Residence: Apartment  Floor Level: 4  Upon entering residence, is there a bedroom on the main floor (no further steps)?: Yes  Upon entering residence, is there a bathroom on the main floor (no further steps)?: Yes  In the last 12 months, was there a time when you were not able to pay the mortgage or rent on time?: No  In the last 12 months, was there a time when you did not have a steady place to sleep or slept in a shelter (including now)?: No  Homeless/housing insecurity resource given?: N/A  Living Arrangements: Lives Alone    Activities of Daily Living Prior to Admission  Functional Status: Independent  Completes ADLs independently?: Yes  Ambulates independently?: Yes  Does patient use assisted devices?: Yes  Assisted Devices (DME) used: Straight Cane  Does patient currently own DME?: Yes  What DME does the patient currently own?: Straight Cane  Does patient have a history of Outpatient Therapy (PT/OT)?: Yes  Does the patient have a history of Short-Term Rehab?: No  Does patient have a history of HHC?: No  Does patient currently have [address]?: No         Patient Information Continued  Income Source: Pension/half-way  Does patient have prescription coverage?: Yes  Within the past 12 months, you worried that your food would run out before you got the money to buy more.: Never true  Within the past 12 months, the food you bought just didn't last and you didn't have money to get more.: Never true  Food insecurity resource given?: N/A  Does patient receive dialysis treatments?: No  Does patient have a history of substance abuse?: Yes  History of Withdrawal Symptoms: Denies past symptoms  Is patient currently in treatment for substance abuse?: N/A - sober  Current Status[de-identified] 201  Does patient have a history of Mental Health Diagnosis?: Yes  Is patient receiving treatment for mental health?: No. Treatment options were provided.   Has patient received inpatient treatment related to mental health in the last 2 years?: Yes ([address])         Means of Transportation  In the past 12 months, has lack of transportation kept you from medical appointments or from getting medications?: No  In the past 12 months, has lack of transportation kept you from meetings, work, or from getting things needed for daily living?: No  Was application for public transport provided?: N/A        DISCHARGE DETAILS:    Discharge planning discussed with[de-identified] patient  Freedom of Choice: Yes  Comments - Freedom of Choice: Cm met with patient to review role of Cm. patient in agreement with dc 201 as confirmed by psych. Cm will complete bed search once medical stability is confirmed. CM contacted family/caregiver?: No- see comments  Were Treatment Team discharge recommendations reviewed with patient/caregiver?: Yes  Did patient/caregiver verbalize understanding of patient care needs?: Yes  Were patient/caregiver advised of the risks associated with not following Treatment Team discharge recommendations?: Yes         Requested [address] [facility]         Is the patient interested in [facility] at discharge?: No    DME Referral Provided  Referral made for DME?: No    Other Referral/Resources/Interventions Provided:  Referral Comments: psych 201-bed search started.          Treatment Team Recommendation: Inpatient Behavioral Health  Discharge Destination Plan[de-identified] Inpatient Behavioral Health

## 2023-11-07 NOTE — QUICK NOTE
Patient tried to elope and was found to be in the lobby floor and she was brought back to the room with the help of nursing supervisor and the staff and security personnel. Patient was placed on four-point restraint. Will give Ativan IM doses as needed as patient is pulled out the IV access. Patient will be evaluated by psychiatrist in a.m. and will be a psych hold on the floor.

## 2023-11-07 NOTE — PLAN OF CARE
Problem: SAFETY ADULT  Goal: Patient will remain free of falls  Description: INTERVENTIONS:  - Educate patient/family on patient safety including physical limitations  - Instruct patient to call for assistance with activity   - Consult OT/PT to assist with strengthening/mobility   - Keep Call bell within reach  - Keep bed low and locked with side rails adjusted as appropriate  - Keep care items and personal belongings within reach  - Initiate and maintain comfort rounds  - Make Fall Risk Sign visible to staff  - Offer Toileting every 2 Hours, in advance of need  - Initiate/Maintain bed and chair alarm  - Apply yellow socks and bracelet for high fall risk patients  - Consider moving patient to room near nurses station  Outcome: Progressing     Problem: DISCHARGE PLANNING  Goal: Discharge to home or other facility with appropriate resources  Description: INTERVENTIONS:  - Identify barriers to discharge w/patient and caregiver  - Arrange for needed discharge resources and transportation as appropriate  - Identify discharge learning needs (meds, wound care, etc.)  - Arrange for interpretive services to assist at discharge as needed  - Refer to Case Management Department for coordinating discharge planning if the patient needs post-hospital services based on physician/advanced practitioner order or complex needs related to functional status, cognitive ability, or social support system  Outcome: Progressing     Problem: Alteration in Thoughts and Perception  Goal: Treatment Goal: Gain control of psychotic behaviors/thinking, reduce/eliminate presenting symptoms and demonstrate improved reality functioning upon discharge  Outcome: Progressing  Goal: Verbalize thoughts and feelings  Description: Interventions:  - Promote a nonjudgmental and trusting relationship with the patient through active listening and therapeutic communication  - Assess patient's level of functioning, behavior and potential for risk  - Engage patient in 1 on 1 interactions  - Encourage patient to express fears, feelings, frustrations, and discuss symptoms    - [place] patient to reality, help patient recognize reality-based thinking   - Administer medications as ordered and assess for potential side effects  - Provide the patient education related to the signs and symptoms of the illness and desired effects of prescribed medications  Outcome: Progressing  Goal: Refrain from acting on delusional thinking/internal stimuli  Description: Interventions:  - Monitor patient closely, per order   - Utilize least restrictive measures   - Set reasonable limits, give positive feedback for acceptable   - Administer medications as ordered and monitor of potential side effects  Outcome: Progressing  Goal: Agree to be compliant with medication regime, as prescribed and report medication side effects  Description: Interventions:  - Offer appropriate PRN medication and supervise ingestion; conduct AIMS, as needed   Outcome: Progressing  Goal: Attend and participate in unit activities, including therapeutic, recreational, and educational groups  Description: Interventions:  -Encourage Visitation and family involvement in care  Outcome: Progressing  Goal: Recognize dysfunctional thoughts, communicate reality-based thoughts at the time of discharge  Description: Interventions:  - Provide medication and psycho-education to assist patient in compliance and developing insight into his/her illness   Outcome: Progressing  Goal: Complete daily ADLs, including personal hygiene independently, as able  Description: Interventions:  - Observe, teach, and assist patient with ADLS  - Monitor and promote a balance of rest/activity, with adequate nutrition and elimination   Outcome: Progressing     Problem: Risk for Self Injury/Neglect  Goal: Treatment Goal: Remain safe during length of stay, learn and adopt new coping skills, and be free of self-injurious ideation, impulses and acts at the time of discharge  Outcome: Progressing  Goal: Verbalize thoughts and feelings  Description: Interventions:  - Assess and re-assess patient's lethality and potential for self-injury  - Engage patient in 1:1 interactions, daily, for a minimum of 15 minutes  - Encourage patient to express feelings, fears, frustrations, hopes  - Establish rapport/trust with patient   Outcome: Progressing  Goal: Refrain from harming self  Description: Interventions:  - Monitor patient closely, per order  - Develop a trusting relationship  - Supervise medication ingestion, monitor effects and side effects   Outcome: Progressing  Goal: Attend and participate in unit activities, including therapeutic, recreational, and educational groups  Description: Interventions:  - Provide therapeutic and educational activities daily, encourage attendance and participation, and document same in the medical record  - Obtain collateral information, encourage visitation and family involvement in care   Outcome: Progressing  Goal: Recognize maladaptive responses and adopt new coping mechanisms  Outcome: Progressing  Goal: Complete daily ADLs, including personal hygiene independently, as able  Description: Interventions:  - Observe, teach, and assist patient with ADLS  - Monitor and promote a balance of rest/activity, with adequate nutrition and elimination  Outcome: Progressing     Problem: Risk for Violence/Aggression Toward Others  Goal: Treatment Goal: Refrain from acts of violence/aggression during length of stay, and demonstrate improved impulse control at the time of discharge  Outcome: Progressing  Goal: Verbalize thoughts and feelings  Description: Interventions:  - Assess and re-assess patient's level of risk, every waking shift  - Engage patient in 1:1 interactions, daily, for a minimum of 15 minutes   - Allow patient to express feelings and frustrations in a safe and non-threatening manner   - Establish rapport/trust with patient   Outcome: Progressing  Goal: Refrain from harming others  Outcome: Progressing  Goal: Refrain from destructive acts on the environment or property  Outcome: Progressing  Goal: Control angry outbursts  Description: Interventions:  - Monitor patient closely, per order  - Ensure early verbal de-escalation  - Monitor prn medication needs  - Set reasonable/therapeutic limits, outline behavioral expectations, and consequences   - Provide a non-threatening milieu, utilizing the least restrictive interventions   Outcome: Progressing  Goal: Attend and participate in unit activities, including therapeutic, recreational, and educational groups  Description: Interventions:  - Provide therapeutic and educational activities daily, encourage attendance and participation, and document same in the medical record   Outcome: Progressing  Goal: Identify appropriate positive anger management techniques  Description: Interventions:  - Offer anger management and coping skills groups   - Staff will provide positive feedback for appropriate anger control  Outcome: Progressing     Problem: NEUROSENSORY - ADULT  Goal: Achieves stable or improved neurological status  Description: INTERVENTIONS  - Monitor and report changes in neurological status  - Monitor vital signs such as temperature, blood pressure, glucose, and any other labs ordered   - Initiate measures to prevent increased intracranial pressure  - Monitor for seizure activity and implement precautions if appropriate      Outcome: Progressing  Goal: Remains free of injury related to seizures activity  Description: INTERVENTIONS  - Maintain airway, patient safety  and administer oxygen as ordered  - Monitor patient for seizure activity, document and report duration and description of seizure to physician/advanced practitioner  - If seizure occurs,  ensure patient safety during seizure  - Reorient patient post seizure  - Seizure pads on all 4 side rails  - Instruct patient/family to notify RN of any seizure activity including if an aura is experienced  - Instruct patient/family to call for assistance with activity based on nursing assessment  - Administer anti-seizure medications if ordered    Outcome: Progressing  Goal: Achieves maximal functionality and self care  Description: INTERVENTIONS  - Monitor swallowing and airway patency with patient fatigue and changes in neurological status  - Encourage and assist patient to increase activity and self care.    - Encourage visually impaired, hearing impaired and aphasic patients to use assistive/communication devices  Outcome: Progressing

## 2023-11-07 NOTE — PROGRESS NOTES
[address]  Progress Note  Name: Vani Morrow  MRN: [id number]  Unit/Bed#: -01 I Date of Admission: 11/6/2023   Date of Service: 11/7/2023 I Hospital Day: 0    Addendum per d/w psych we can resume Blanca's meds and patient will require inpatient psych treatment. Assessment/Plan   * Intentional overdose [facility])  Assessment & Plan  Patient was brought in after taking 40 tablets of flexeril 10 mg; intentional self harm  Toxicology was consulted- per their consult, watch for anticholinergic affects, such as tachycardia, dilated pupils, and delirium as well as decreased alertness and sedation. 1:1 observation  Pending psych consult-defer to them regarding med mgmt and dispo planning  Serial EKGs to monitor for prolonged QTc, continue tele  Monitor for seizures    History of pulmonary embolism  Assessment & Plan  Continue coumadin with goal INR 2-3    Bilateral leg edema  Assessment & Plan  On lasix 40 mg daily  Recommend reducing sodium intake and elevating legs    MAG (obstructive sleep apnea)  Assessment & Plan  Brought in CPAP from home    BMI 45.0-49.9, adult ([address])  Assessment & Plan  BMI 48--recommend weight loss    Benign essential hypertension  Assessment & Plan  On clonidine, cozaar and norvasc         VTE Pharmacologic Prophylaxis:   coumadin    Patient Centered Rounds: spoke with RN  Discussions with Specialists or Other Care Team Provider: case mgmt    Education and Discussions with Family / Patient:     Total Time Spent on Date of Encounter in care of patient: 25 mins. This time was spent on one or more of the following: performing physical exam; counseling and coordination of care; obtaining or reviewing history; documenting in the medical record; reviewing/ordering tests, medications or procedures; communicating with other healthcare professionals and discussing with patient's family/caregivers.     Current Length of Stay: 0 day(s)  Current Patient Status: Observation Certification Statement: The patient, admitted on an observation basis, will POSSIBLY now require > 2 midnight hospital stay due to pending psych  Discharge Plan: medically stable for discharge to psychiatry when bed available    Code Status: Level 1 - Full Code    Subjective:   Patient requesting a shower and offers no complaints or concerns. Tells me that she always has some hand tremor and denies any new abnormal movements. 1:! At bedside reports she had a "tantrum" last night, and pulled out her IV but was eventually able to be calmed down    Objective:     Vitals:   Temp (24hrs), Av.6 °F (36.4 °C), Min:97 °F (36.1 °C), Max:98.3 °F (36.8 °C)    Temp:  [97 °F (36.1 °C)-98.3 °F (36.8 °C)] 98 °F (36.7 °C)  HR:  [67-80] 80  Resp:  [16-21] 16  BP: (103-163)/(53-90) 117/58  SpO2:  [92 %-95 %] 94 %  Body mass index is 48.26 kg/m². Input and Output Summary (last 24 hours):   No intake or output data in the 24 hours ending 23 1142    Physical Exam:   Physical Exam  Vitals reviewed. Constitutional:       General: She is not in acute distress. Appearance: She is obese. She is not ill-appearing, toxic-appearing or diaphoretic. Comments: Patient was sleeping comfortably with her cpap on when I entered initially, but a short time later was awake and OOB to chair and nontoxic appearing   HENT:      Head: Normocephalic. Eyes:      General: No scleral icterus. Right eye: No discharge. Left eye: No discharge. Cardiovascular:      Rate and Rhythm: Normal rate and regular rhythm. Heart sounds: No murmur heard. Pulmonary:      Effort: No respiratory distress. Breath sounds: No stridor. No wheezing, rhonchi or rales. Abdominal:      General: There is no distension. Tenderness: There is no guarding. Musculoskeletal:      Right lower leg: Edema present. Left lower leg: Edema present.       Comments: Mild BLE edema unchanged   Skin:     Coloration: Skin is not jaundiced or pale. Findings: No bruising, erythema, lesion or rash. Neurological:      Mental Status: Mental status is at baseline.           Additional Data:     Labs:  Results from last 7 days   Lab Units 11/06/23  0710   WBC Thousand/uL 10.36*   HEMOGLOBIN g/dL 14.6   HEMATOCRIT % 45.2   PLATELETS Thousands/uL 147*   NEUTROS PCT % 62   LYMPHS PCT % 26   MONOS PCT % 9   EOS PCT % 1     Results from last 7 days   Lab Units 11/07/23  0459 11/06/23  0710   SODIUM mmol/L 140 139   POTASSIUM mmol/L 3.8 3.8   CHLORIDE mmol/L 108 107   CO2 mmol/L 21 23   BUN mg/dL 13 15   CREATININE mg/dL 0.57* 0.73   ANION GAP mmol/L 11 9   CALCIUM mg/dL 9.0 9.0   ALBUMIN g/dL  --  4.0   TOTAL BILIRUBIN mg/dL  --  0.23   ALK PHOS U/L  --  49   ALT U/L  --  11   AST U/L  --  12*   GLUCOSE RANDOM mg/dL 100 95     Results from last 7 days   Lab Units 11/07/23  0459   INR  1.98*     Results from last 7 days   Lab Units 11/06/23  1817   POC GLUCOSE mg/dl 95               Lines/Drains:  Invasive Devices       Peripheral Intravenous Line  Duration             Peripheral IV 11/07/23 Proximal;Right;Ventral (anterior) Forearm <1 day                    Telemetry:  Telemetry Orders (From admission, onward)               24 Hour Telemetry Monitoring  Continuous x 24 Hours (Telem)        Question:  Reason for 24 Hour Telemetry  Answer:  Drug overdose known to cause cardiac arrhythmias (e.g. - Cocaine, TCA, Amphetamines, Phenothiazines, Digoxin, etc.) Meds known to need cardiac monitoring: Amiodarone, Dopamine, Dobutamine, Phenytoin                     Telemetry Reviewed: Normal Sinus Rhythm  Indication for Continued Telemetry Use: Arrthymias requiring medical therapy intentional OD., monitor Qt         Reviewed updated EKG    Imaging:     Recent Cultures (last 7 days):         Last 24 Hours Medication List:   Current Facility-Administered Medications   Medication Dose Route Frequency Provider Last Rate    amLODIPine  5 mg Oral Daily Zoila ERICKSON Pemberton      cholecalciferol  1,000 Units Oral Daily Zoila Pemberton, ERICKSON      cloNIDine  0.1 mg Oral Q12H [facility] Zoila Pemberton PA-C      divalproex sodium  750 mg Oral Daily Zoila Pemberton, ERICKSON      furosemide  40 mg Oral Daily Zoila Pemberton PA-C      lactated ringers  100 mL/hr Intravenous Continuous Yung Castaneda  mL/hr (11/07/23 1175)    levothyroxine  125 mcg Oral Early Morning Zoila Pemberton PA-C      LORazepam  1 mg Intravenous Q4H PRN Zoila Pemberton PA-C      losartan  50 mg Oral Daily Zoila Pemberton, ERICKSON      naltrexone  50 mg Oral Daily Zoila Pemberton, ERICKSON      nystatin   Topical BID Zoila Pemberton PA-C      pantoprazole  20 mg Oral Early Morning Zoila Pemberton, ERICKSON      warfarin  4 mg Oral Daily Zoila Pemberton PA-C          Today, Patient Was Seen By: Tvao Guevara PA-C    **Please Note: This note may have been constructed using a voice recognition system. **

## 2023-11-07 NOTE — ASSESSMENT & PLAN NOTE
Patient was brought in after taking 40 tablets of flexeril 10 mg; intentional self harm  Toxicology was consulted- per their consult, watch for anticholinergic affects, such as tachycardia, dilated pupils, and delirium as well as decreased alertness and sedation.    1:1 observation  Pending psych consult-defer to them regarding med mgmt and dispo planning  Serial EKGs to monitor for prolonged QTc, continue tele  Monitor for seizures

## 2023-11-08 ENCOUNTER — PATIENT OUTREACH (OUTPATIENT)
Dept: CASE MANAGEMENT | Facility: OTHER | Age: 53
End: 2023-11-08

## 2023-11-08 PROBLEM — G92.9 TOXIC ENCEPHALOPATHY: Status: ACTIVE | Noted: 2023-11-08

## 2023-11-08 LAB
INR PPP: 1.85 (ref 0.84–1.19)
PROTHROMBIN TIME: 21 SECONDS (ref 11.6–14.5)

## 2023-11-08 PROCEDURE — 99232 SBSQ HOSP IP/OBS MODERATE 35: CPT | Performed by: INTERNAL MEDICINE

## 2023-11-08 PROCEDURE — 99232 SBSQ HOSP IP/OBS MODERATE 35: CPT | Performed by: PSYCHIATRY & NEUROLOGY

## 2023-11-08 PROCEDURE — 85610 PROTHROMBIN TIME: CPT | Performed by: PHYSICIAN ASSISTANT

## 2023-11-08 PROCEDURE — 93005 ELECTROCARDIOGRAM TRACING: CPT

## 2023-11-08 RX ORDER — LORAZEPAM 2 MG/ML
0.5 INJECTION INTRAMUSCULAR EVERY 6 HOURS PRN
Status: DISCONTINUED | OUTPATIENT
Start: 2023-11-08 | End: 2023-11-10

## 2023-11-08 RX ORDER — LORAZEPAM 2 MG/ML
1 INJECTION INTRAMUSCULAR EVERY 6 HOURS PRN
Status: DISCONTINUED | OUTPATIENT
Start: 2023-11-08 | End: 2023-11-08

## 2023-11-08 RX ORDER — LORAZEPAM 1 MG/1
1 TABLET ORAL EVERY 8 HOURS PRN
Status: DISCONTINUED | OUTPATIENT
Start: 2023-11-08 | End: 2023-11-10

## 2023-11-08 RX ORDER — LORAZEPAM 1 MG/1
1 TABLET ORAL ONCE
Status: COMPLETED | OUTPATIENT
Start: 2023-11-08 | End: 2023-11-08

## 2023-11-08 RX ADMIN — TOPIRAMATE 200 MG: 100 TABLET ORAL at 08:51

## 2023-11-08 RX ADMIN — PANTOPRAZOLE SODIUM 20 MG: 20 TABLET, DELAYED RELEASE ORAL at 05:47

## 2023-11-08 RX ADMIN — LOSARTAN POTASSIUM 50 MG: 50 TABLET, FILM COATED ORAL at 08:52

## 2023-11-08 RX ADMIN — METOPROLOL TARTRATE 25 MG: 25 TABLET, FILM COATED ORAL at 08:50

## 2023-11-08 RX ADMIN — LURASIDONE HYDROCHLORIDE 40 MG: 40 TABLET, FILM COATED ORAL at 20:43

## 2023-11-08 RX ADMIN — LORAZEPAM 1 MG: 1 TABLET ORAL at 12:14

## 2023-11-08 RX ADMIN — CLONIDINE HYDROCHLORIDE 0.1 MG: 0.1 TABLET ORAL at 20:43

## 2023-11-08 RX ADMIN — NYSTATIN 1 APPLICATION: 100000 POWDER TOPICAL at 08:57

## 2023-11-08 RX ADMIN — NALTREXONE HYDROCHLORIDE 50 MG: 50 TABLET, FILM COATED ORAL at 08:53

## 2023-11-08 RX ADMIN — Medication 1000 UNITS: at 08:51

## 2023-11-08 RX ADMIN — NYSTATIN 1 APPLICATION: 100000 POWDER TOPICAL at 18:17

## 2023-11-08 RX ADMIN — DIVALPROEX SODIUM 750 MG: 500 TABLET, DELAYED RELEASE ORAL at 08:51

## 2023-11-08 RX ADMIN — METOPROLOL TARTRATE 25 MG: 25 TABLET, FILM COATED ORAL at 20:43

## 2023-11-08 RX ADMIN — LORAZEPAM 1 MG: 1 TABLET ORAL at 10:26

## 2023-11-08 RX ADMIN — CLONIDINE HYDROCHLORIDE 0.1 MG: 0.1 TABLET ORAL at 08:52

## 2023-11-08 RX ADMIN — LEVOTHYROXINE SODIUM 125 MCG: 125 TABLET ORAL at 05:47

## 2023-11-08 RX ADMIN — BUPROPION HYDROCHLORIDE 300 MG: 150 TABLET, FILM COATED, EXTENDED RELEASE ORAL at 08:51

## 2023-11-08 RX ADMIN — WARFARIN SODIUM 4 MG: 4 TABLET ORAL at 08:52

## 2023-11-08 RX ADMIN — ATOMOXETINE 40 MG: 40 CAPSULE ORAL at 08:52

## 2023-11-08 RX ADMIN — FUROSEMIDE 40 MG: 40 TABLET ORAL at 08:51

## 2023-11-08 RX ADMIN — LORAZEPAM 0.5 MG: 2 INJECTION INTRAMUSCULAR; INTRAVENOUS at 21:21

## 2023-11-08 RX ADMIN — TOPIRAMATE 200 MG: 100 TABLET ORAL at 18:16

## 2023-11-08 RX ADMIN — AMLODIPINE BESYLATE 5 MG: 5 TABLET ORAL at 08:51

## 2023-11-08 RX ADMIN — LURASIDONE HYDROCHLORIDE 40 MG: 40 TABLET, FILM COATED ORAL at 08:58

## 2023-11-08 RX ADMIN — LORAZEPAM 1 MG: 1 TABLET ORAL at 18:16

## 2023-11-08 NOTE — ASSESSMENT & PLAN NOTE
Toxic encephalopathy secondary to intentional overdose evidenced by lethargy and delirium.   Resolved

## 2023-11-08 NOTE — PROGRESS NOTES
Documentation clarification      Assessment/Plan   Toxic encephalopathy  Assessment & Plan  Toxic encephalopathy secondary to intentional overdose evidenced by lethargy and delirium.   Resolved           Bria Dockery DO

## 2023-11-08 NOTE — ASSESSMENT & PLAN NOTE
History of mood disorder PE hypertension obesity and edema who presents for intentional drug overdose of cyclobenzaprine  Medically has been stable. No events on telemetry. Will discontinue. QTc on today's  which has remained stable. Appreciate toxicology recommendations. Seen by psychiatry. 302 signed. Medically stable for transfer when bed available. Mood disorder: Continue wellbutrin depakote lurasidone and topiramate.

## 2023-11-08 NOTE — PLAN OF CARE
Problem: PAIN - ADULT  Goal: Verbalizes/displays adequate comfort level or baseline comfort level  Description: Interventions:  - Encourage patient to monitor pain and request assistance  - Assess pain using appropriate pain scale  - Administer analgesics based on type and severity of pain and evaluate response  - Implement non-pharmacological measures as appropriate and evaluate response  - Consider cultural and social influences on pain and pain management  - Notify physician/advanced practitioner if interventions unsuccessful or patient reports new pain  Outcome: Progressing     Problem: SAFETY ADULT  Goal: Patient will remain free of falls  Description: INTERVENTIONS:  - Educate patient/family on patient safety including physical limitations  - Instruct patient to call for assistance with activity   - Consult OT/PT to assist with strengthening/mobility   - Keep Call bell within reach  - Keep bed low and locked with side rails adjusted as appropriate  - Keep care items and personal belongings within reach  - Initiate and maintain comfort rounds  - Offer Toileting every 2 Hours, in advance of need  - Initiate/Maintain bed alarm  - Obtain necessary fall risk management equipment: continual observation  - Apply yellow socks and bracelet for high fall risk patients  - Consider moving patient to room near nurses station  Outcome: Progressing     Problem: Alteration in Thoughts and Perception  Goal: Treatment Goal: Gain control of psychotic behaviors/thinking, reduce/eliminate presenting symptoms and demonstrate improved reality functioning upon discharge  Outcome: Progressing  Goal: Agree to be compliant with medication regime, as prescribed and report medication side effects  Description: Interventions:  - Offer appropriate PRN medication and supervise ingestion; conduct AIMS, as needed   Outcome: Progressing     Problem: Risk for Self Injury/Neglect  Goal: Refrain from harming self  Description: Interventions:  - Monitor patient closely, per order  - Develop a trusting relationship  - Supervise medication ingestion, monitor effects and side effects   Outcome: Progressing     Problem: NEUROSENSORY - ADULT  Goal: Remains free of injury related to seizures activity  Description: INTERVENTIONS  - Maintain airway, patient safety  and administer oxygen as ordered  - Monitor patient for seizure activity, document and report duration and description of seizure to physician/advanced practitioner  - If seizure occurs,  ensure patient safety during seizure  - Reorient patient post seizure  - Seizure pads on all 4 side rails  - Instruct patient/family to notify RN of any seizure activity including if an aura is experienced  - Instruct patient/family to call for assistance with activity based on nursing assessment  - Administer anti-seizure medications if ordered    Outcome: Progressing

## 2023-11-08 NOTE — QUICK NOTE
PORFIRIO moore and I were trying to get the patient changed into paper scrubs but at the moment she is having anxiety and is not responding to anything we say and will not move out of the recliner

## 2023-11-08 NOTE — PROGRESS NOTES
Internal medicine note. Patient medically stable for transfer to psychiatric unit when bed available. Formal progress note to follow.     Relchrista Collazo, DO

## 2023-11-08 NOTE — PROGRESS NOTES
[address]  Progress Note  Name: Bakari Segal  MRN: [id number]  Unit/Bed#: -01 I Date of Admission: 11/6/2023   Date of Service: 11/8/2023 I Hospital Day: 1    Assessment/Plan   * Intentional overdose [facility])  Assessment & Plan  History of mood disorder PE hypertension obesity and edema who presents for intentional drug overdose of cyclobenzaprine  Medically has been stable. No events on telemetry. Will discontinue. QTc on today's  which has remained stable. Appreciate toxicology recommendations. Seen by psychiatry. 302 signed. Medically stable for transfer when bed available. Mood disorder: Continue wellbutrin depakote lurasidone and topiramate. History of pulmonary embolism  Assessment & Plan  INR slightly subtherapeutic. Continue 4 mg daily for now    Results from last 7 days   Lab Units 11/08/23  0435 11/07/23  0459 11/06/23  0710 11/02/23  1002   INR  1.85* 1.98* 2.97* 2.89*       Bilateral leg edema  Assessment & Plan  Chronic edema on furosemide daily    BMI 45.0-49.9, adult (HCC)  Assessment & Plan  Body mass index is 48.26 kg/m². MAG (obstructive sleep apnea)  Assessment & Plan  CPAP brought in from home    Hypothyroidism  Assessment & Plan  Continue levothyroxine    Benign essential hypertension  Assessment & Plan  Blood pressure stable continue losartan metoprolol clonidine and amlodipine. Hold for hypotension. VTE Pharmacologic Prophylaxis:   High Risk (Score >/= 5) - Pharmacological DVT Prophylaxis Ordered: warfarin (Coumadin). Sequential Compression Devices Ordered. Patient Centered Rounds: I have performed bedside rounds with nursing staff today. Discussions with Specialists or Other Care Team Provider: Case management    Education and Discussions with Family / Patient: Updated  (father) via phone. Time Spent for Care:    This time was spent on one or more of the following: performing physical exam; counseling and coordination of care; obtaining or reviewing history; documenting in the medical record; reviewing/ordering tests, medications or procedures; communicating with other healthcare professionals and discussing with patient's family/caregivers. Current Length of Stay: 1 day(s)  Current Patient Status: Inpatient   Certification Statement:  Medically stable for transfer to psychiatric facility when bed available  Discharge Plan:  Awaiting discharge to psychiatric facility    Code Status: Level 1 - Full Code      Subjective:   Patient seen and examined. Tried to elope last night. Now tearful. Objective:   Vitals: Blood pressure 116/83, pulse 63, temperature (!) 97.2 °F (36.2 °C), temperature source Tympanic, resp. rate 20, height 5' 6" (1.676 m), weight 136 kg (299 lb), SpO2 94 %, not currently breastfeeding. Intake/Output Summary (Last 24 hours) at 11/8/2023 1022  Last data filed at 11/8/2023 0501  Gross per 24 hour   Intake 200 ml   Output 400 ml   Net -200 ml       Physical Exam  Vitals reviewed. Constitutional:       General: She is not in acute distress. Appearance: Normal appearance. She is obese. HENT:      Head: Atraumatic. Cardiovascular:      Rate and Rhythm: Regular rhythm. Pulmonary:      Breath sounds: Decreased breath sounds present. No wheezing. Abdominal:      General: Bowel sounds are normal.      Palpations: Abdomen is soft. Tenderness: There is no guarding or rebound. Musculoskeletal:         General: No swelling. Skin:     General: Skin is warm. Neurological:      Mental Status: She is alert. Psychiatric:         Mood and Affect: Affect is tearful.        Additional Data:   Labs:  Results from last 7 days   Lab Units 11/08/23  0435 11/07/23  0459 11/06/23  0710 11/04/23  1540   WBC Thousand/uL  --   --  10.36* 11.32*   HEMOGLOBIN g/dL  --   --  14.6 14.9   PLATELETS Thousands/uL  --   --  147* 173   MCV fL  --   --  93 95   INR  1.85* 1.98* 2.97*  --      Results from last 7 days   Lab Units 23  0459 23  0710 23  1540   SODIUM mmol/L 140 139 141   POTASSIUM mmol/L 3.8 3.8 4.3   CHLORIDE mmol/L 108 107 106   CO2 mmol/L 21 23 27   ANION GAP mmol/L 11 9 8   BUN mg/dL 13 15 13   CREATININE mg/dL 0.57* 0.73 0.62   CALCIUM mg/dL 9.0 9.0 9.2   ALBUMIN g/dL  --  4.0 4.5   TOTAL BILIRUBIN mg/dL  --  0.23 0.28   ALK PHOS U/L  --  49 58   ALT U/L  --  11 11   AST U/L  --  12* 13   EGFR ml/min/1.73sq m 106 94 104   GLUCOSE RANDOM mg/dL 100 95 99             Results from last 7 days   Lab Units 23  1719 23  1540   HS TNI 0HR ng/L  --  3   HS TNI 2HR ng/L 3  --               Results from last 7 days   Lab Units 23  1817   POC GLUCOSE mg/dl 95         Results from last 7 days   Lab Units 23  0710   TSH 3RD GENERATON uIU/mL 8.652*   FREE T4 ng/dL 0.81     * I Have Reviewed All Lab Data Listed Above. Cultures:                   Lines/Drains:  Invasive Devices       None                 Telemetry:   Telemetry Orders (From admission, onward)               24 Hour Telemetry Monitoring  Continuous x 24 Hours (Telem)           Question:  Reason for 24 Hour Telemetry  Answer:  Drug overdose known to cause cardiac arrhythmias (e.g. - Cocaine, TCA, Amphetamines, Phenothiazines, Digoxin, etc.) Meds known to need cardiac monitoring: Amiodarone, Dopamine, Dobutamine, Phenytoin                      Imaging:  Imaging Reports Reviewed Today Include:   No results found.     Scheduled Meds:  Current Facility-Administered Medications   Medication Dose Route Frequency Provider Last Rate    amLODIPine  5 mg Oral Daily Zoila Pemberton PA-C      atoMOXetine  40 mg Oral Daily Zoila Pemberton PA-C      buPROPion  300 mg Oral Daily Zoila Pemberton PA-C      cholecalciferol  1,000 Units Oral Daily Zoila Pemberton PA-C      cloNIDine  0.1 mg Oral Q12H [address] Zoila Pemberton PA-C      divalproex sodium  750 mg Oral Daily Zoila Pemberton PA-C      furosemide  40 mg Oral Daily Zoila Pemberton PA-C      lactated ringers  100 mL/hr Intravenous Continuous Shannon Mascorro  mL/hr (11/07/23 0507)    levothyroxine  125 mcg Oral Early Morning Zoila Pemberton, ERICKSON      LORazepam  1 mg Oral Q8H PRN Noel Reddy DO      losartan  50 mg Oral Daily Zoila Pemberton, ERICKSON      lurasidone  40 mg Oral BID Zoila Pemberton, PA-LUIS      metoprolol tartrate  25 mg Oral Q12H [facility] Zoila Pemberton, ERICKSON      naltrexone  50 mg Oral Daily Zoila Pemberton, ERICKSON      nystatin   Topical BID Zoila Pemberton, PA-LUIS      pantoprazole  20 mg Oral Early Morning Zoila Pemberton, PA-LUIS      topiramate  200 mg Oral BID Zoila Pemberton, PA-LUIS      warfarin  4 mg Oral Daily Zoila Pemberton, ERICKSON         Today, Patient Was Seen By: Denisha Moura DO    ** Please Note: Dictation voice to text software may have been used in the creation of this document.  **

## 2023-11-08 NOTE — PROGRESS NOTES
Progress Note - Keaton Sanders 47 y.o. female MRN: [id number]  Unit/Bed#: -01 Encounter: [id number]          I came to see the patient for continuity of care. Per primary team, patient tried to elope last night. She was placed in restraints and given prn Ativan IM. Patient was minimally cooperative with interview today. She did not verbally respond to questioning and had downcast eyes. She shook her head when asked if she was experiencing any physical complaints or adverse effects to restarting her psychiatric medications. Patient did not answer verbally to other questions rather stated "what's the point". Behavior over the last 24 hours:  regressed  Sleep: unable to assess due to lack of cooperation  Appetite: unable to assess due to lack of cooperation  Medication side effects: shook her head when asked  ROS: Unable to assess due to lack of cooperation     Mental Status Evaluation:  Appearance:  appears stated age, overweight , tattooed , and sitting upright in chair   Behavior:  minimally cooperative and downcast eyes   Speech:  scant   Mood:  Unable to assess due to lack of cooperation   Affect:  Constricted and Dysphoric   Language: Unable to assess due to lack of cooperation   Thought Process:  Poverty of thought   Associations: Unable to assess due to lack of cooperation   Thought Content:  Unable to assess due to lack of cooperation   Perceptual Disturbances: Unable to assess due to lack of cooperation   Risk Potential: S/p intentional overdose of flexeril.  Unable to assess further due to lack of cooperation   Sensorium:  person, place, and situation   Memory:  recent and remote memory: unable to assess due to lack of cooperation   Cognition:  recent and remote memory: unable to assess due to lack of cooperation   Consciousness:  alert and awake    Attention: Unable to assess due to lack of cooperation   Intellect: Unable to assess due to lack of cooperation   [facility] Knowledge: Unable to assess due to lack of cooperation   Insight:  poor   Judgment: poor   Muscle Strength and Tone: Not assessed   Gait/Station: Not assessed   Motor Activity: no abnormal movements         Assessment/Plan  Dayana Riley is a 47 y.o. female with past medical history of hypertension, obesity, history of pulmonary embolism, MAG, schizoaffective disorder bipolar type who presented to the ED after an intentional overdose and is admitted for the same. Patient was seen for psychiatric consultation yesterday. Patient had reported having taken an overdose of 41 Flexeril 10 mg pills. Patient signed 61 51 81 for voluntary inpatient psychiatric commitment. Patient's home psychiatric medications were restarted. Per primary team, patient tried to elope last night. She was placed in restraints and given prn Ativan IM. Patient was minimally cooperative with interview today. She did not verbally respond to questioning and had downcast eyes. She shook her head when asked if she was experiencing any physical complaints or adverse effects to restarting her psychiatric medications. Patient did not answer verbally to other questions rather stated "what's the point". Patient continues to require inpatient psychiatric hospitalization. She exhibits poor insight and poor judgment. 302 initiated and upheld.       Diagnosis:  Mood disorder unspecified  Differential diagnosis includes bipolar disorder, mixed, severe versus schizoaffective disorder bipolar type    Recommended Treatment:   Continue medical management  Continue one-to-one observation  Patient requires inpatient psychiatric hospitalization  302 initiated and upheld  Patient is currently on an involuntary inpatient psychiatric commitment  Continue Wellbutrin  mg daily  Continue Strattera 40 mg daily  Continue Latuda 40 mg BID with meals  Continue Topamax 200 mg BID  Continue Depakote 750 mg daily    Valproic acid level for Friday morning  Can continue lorazepam 1 mg prn q8hr anxiety  Received patient's medication list from ACT team from  however list does not appear to be complete. Recommend following up with patient's ACT team again regarding patient's medication list and in particular patient's Depakote dosing   I will defer psychiatric medication changes at this time to inpatient psychiatric team  Discussed with the primary team  Please contact Jessi for psychiatric follow-up for tomorrow   For overnights and weekends please contact Jessi for psychiatric purposes  I will follow-up with patient on Friday if she continues to be hospitalized here      Medications: all current active meds have been reviewed. See recommended treatment above. Risks, benefits and possible side effects of Medications:     Risks, benefits, and possible side effects of medications have been previously explained to the patient. No new medication changes today. Labs: I have personally reviewed all pertinent laboratory results. I have personally reviewed all pertinent laboratory/tests results. Labs in last 72 hours:   Recent Labs     11/06/23  0710 11/06/23  0956 11/07/23  0459   WBC 10.36*  --   --    RBC 4.87  --   --    HGB 14.6  --   --    HCT 45.2  --   --    *  --   --    RDW 14.1  --   --    NEUTROABS 6.52  --   --    SODIUM 139  --  140   K 3.8  --  3.8     --  108   CO2 23  --  21   BUN 15  --  13   CREATININE 0.73  --  0.57*   GLUC 95  --  100   GLUF  --   --  100*   CALCIUM 9.0  --  9.0   AST 12*  --   --    ALT 11  --   --    ALKPHOS 49  --   --    TP 7.2  --   --    ALB 4.0  --   --    TBILI 0.23  --   --    VALPROICTOT 58  --   --    AMMONIA  --  63  --    [id number] 8.652*  --   --    FREET4 0.81  --   --    PREGSERUM Negative  --   --          Edra Pilling, DO  11/08/23      This note has been constructed using a voice recognition system. There may be translation, syntax,  or grammatical errors.  If you have any questions, please contact the dictating provider.

## 2023-11-08 NOTE — NURSING NOTE
Attempted to offer patient paper scrubs for safety. Pt. Will not verbally respond. Offered to remove tele wires and masimo for pt. Comfort, pt declines.

## 2023-11-08 NOTE — PLAN OF CARE
Problem: Risk for Violence/Aggression Toward Others  Goal: Refrain from destructive acts on the environment or property  Outcome: Progressing

## 2023-11-08 NOTE — PROGRESS NOTES
Outpatient Care Management Note:  In basket alert received that status was changed from observation to admission. Chart notes reviewed.

## 2023-11-08 NOTE — ASSESSMENT & PLAN NOTE
INR slightly subtherapeutic.   Continue 4 mg daily for now    Results from last 7 days   Lab Units 11/08/23  0435 11/07/23  0459 11/06/23  0710 11/02/23  1002   INR  1.85* 1.98* 2.97* 2.89*

## 2023-11-08 NOTE — CASE MANAGEMENT
Case Management Discharge Planning Note    Patient name Jonathan Notice  Location /-50 MRN [id number]  : 1970 Date 2023       Current Admission Date: 2023  Current Admission Diagnosis:Intentional overdose [facility])   Patient Active Problem List    Diagnosis Date Noted    Toxic encephalopathy 2023    Candida infection of flexural skin 2023    Lymphedema 10/30/2023    Acute saddle pulmonary embolism ([address]) 10/16/2023    Acute deep vein thrombosis of lower leg, left ([address]) 10/16/2023    Polysubstance abuse ([address]) 2023    Restless leg syndrome 2023    Intentional overdose ([address]) 2023    Tremors of nervous system 2023    Contact dermatitis and eczema 09/15/2023    History of pulmonary embolism 2023    Elevated troponin 2023    Acute respiratory failure ([address]) 2023    Cervicalgia 08/10/2023    Medial epicondylitis of right elbow 2023    Bilateral leg edema 2023    Chronic migraine without aura without status migrainosus, not intractable 2023    Chronic eczematous otitis externa of both ears 2023    Intentional self-harm by unspecified sharp object, sequela ([address]) 2023    Suicidal deliberate poisoning ([address]) 2022    Knee pain, right 2022    Platelets decreased ([address]) 2022    GERD (gastroesophageal reflux disease) 2022    Diarrhea 2022    Ecchymosis 2022    Anxiety 2022    Borderline personality disorder ([address]) 2022    Contusion of elbow 2021    Cognitive impairment 10/07/2021    Substance abuse ([address]) 2021    Potential for cognitive impairment 2021    Mood disorder ([address]) 2021    Chronic tension-type headache, intractable 03/10/2021    History of COVID-19 2020    Memory difficulties 2020    BMI 45.0-49.9, adult ([address]) 2020    Mixed hyperlipidemia 10/30/2019    COPD (chronic obstructive pulmonary disease) ([address]) 2019    Major depressive disorder 05/23/2019    Sjogren's syndrome ([address]) 05/23/2019    Primary osteoarthritis of both knees 08/08/2018    MAG (obstructive sleep apnea) 04/24/2018    Medial epicondylitis of elbow, left 04/03/2018    Overactive bladder 06/08/2017    Schizoaffective disorder, bipolar type ([address]) 03/17/2017    Hypothyroidism 03/17/2017    Restrictive lung disease 02/01/2017    Family history of renal cancer 04/26/2016    Microhematuria 12/23/2015    Yan's esophagus determined by biopsy 10/19/2015    Medical clearance for psychiatric admission 09/14/2015    DDD (degenerative disc disease), lumbar 04/09/2015    Spondylosis of lumbosacral joint without myelopathy 04/09/2015    Urinary incontinence 03/31/2015    Benign essential hypertension 07/02/2014    Edema 03/26/2014    Chronic low back pain 04/07/2012    Hypertension 10/25/2008      LOS (days): 1  Geometric Mean LOS (GMLOS) (days): 2.50  Days to GMLOS:1.7     OBJECTIVE:  Risk of Unplanned Readmission Score: 57.62         Current admission status: Inpatient   Preferred Pharmacy:   [facility], [address] [address]  Phone: [phone] Fax: [phone]    Primary Care Provider: JHONATAN Chapman    Primary Insurance: MEDICARE  Secondary Insurance: [facility]    DISCHARGE DETAILS:    Discharge planning discussed with[de-identified] patient, MD  Freedom of Choice: Yes  Comments - Freedom of Choice: Cm informed that after hours, patient attempted to elope from hospital. Patient is now a 302. Cm is attempting to locate an accepting inpatient behavioral health unit for patient. Cm read patient her rights and answered all questions. Cm will follow.   CM contacted family/caregiver?: No- see comments  Were Treatment Team discharge recommendations reviewed with patient/caregiver?: Yes  Did patient/caregiver verbalize understanding of patient care needs?: Yes  Were patient/caregiver advised of the risks associated with not following Treatment Team discharge recommendations?: Yes         Requested [address]         Is the patient interested in [address] at discharge?: No    DME Referral Provided  Referral made for DME?: No    Other Referral/Resources/Interventions Provided:  Referral Comments: 302 bed search started.          Treatment Team Recommendation: Inpatient Behavioral Health  Discharge Destination Plan[de-identified] Inpatient Behavioral Health

## 2023-11-09 ENCOUNTER — PATIENT OUTREACH (OUTPATIENT)
Dept: CASE MANAGEMENT | Facility: HOSPITAL | Age: 53
End: 2023-11-09

## 2023-11-09 LAB
ALBUMIN SERPL BCP-MCNC: 3.7 G/DL (ref 3.5–5)
ALP SERPL-CCNC: 47 U/L (ref 34–104)
ALT SERPL W P-5'-P-CCNC: 10 U/L (ref 7–52)
ANION GAP SERPL CALCULATED.3IONS-SCNC: 10 MMOL/L
AST SERPL W P-5'-P-CCNC: 12 U/L (ref 13–39)
BILIRUB SERPL-MCNC: 0.31 MG/DL (ref 0.2–1)
BUN SERPL-MCNC: 18 MG/DL (ref 5–25)
CALCIUM SERPL-MCNC: 9 MG/DL (ref 8.4–10.2)
CHLORIDE SERPL-SCNC: 106 MMOL/L (ref 96–108)
CO2 SERPL-SCNC: 23 MMOL/L (ref 21–32)
CREAT SERPL-MCNC: 0.61 MG/DL (ref 0.6–1.3)
ERYTHROCYTE [DISTWIDTH] IN BLOOD BY AUTOMATED COUNT: 14 % (ref 11.6–15.1)
GFR SERPL CREATININE-BSD FRML MDRD: 104 ML/MIN/1.73SQ M
GLUCOSE SERPL-MCNC: 95 MG/DL (ref 65–140)
HCT VFR BLD AUTO: 43.1 % (ref 34.8–46.1)
HGB BLD-MCNC: 13.5 G/DL (ref 11.5–15.4)
INR PPP: 1.98 (ref 0.84–1.19)
MCH RBC QN AUTO: 29.7 PG (ref 26.8–34.3)
MCHC RBC AUTO-ENTMCNC: 31.3 G/DL (ref 31.4–37.4)
MCV RBC AUTO: 95 FL (ref 82–98)
PLATELET # BLD AUTO: 156 THOUSANDS/UL (ref 149–390)
PMV BLD AUTO: 10.9 FL (ref 8.9–12.7)
POTASSIUM SERPL-SCNC: 3.7 MMOL/L (ref 3.5–5.3)
PROT SERPL-MCNC: 6.6 G/DL (ref 6.4–8.4)
PROTHROMBIN TIME: 22.1 SECONDS (ref 11.6–14.5)
RBC # BLD AUTO: 4.54 MILLION/UL (ref 3.81–5.12)
SODIUM SERPL-SCNC: 139 MMOL/L (ref 135–147)
WBC # BLD AUTO: 9.45 THOUSAND/UL (ref 4.31–10.16)

## 2023-11-09 PROCEDURE — 80053 COMPREHEN METABOLIC PANEL: CPT | Performed by: INTERNAL MEDICINE

## 2023-11-09 PROCEDURE — 85027 COMPLETE CBC AUTOMATED: CPT | Performed by: INTERNAL MEDICINE

## 2023-11-09 PROCEDURE — 99232 SBSQ HOSP IP/OBS MODERATE 35: CPT | Performed by: PHYSICIAN ASSISTANT

## 2023-11-09 PROCEDURE — 85610 PROTHROMBIN TIME: CPT | Performed by: INTERNAL MEDICINE

## 2023-11-09 RX ADMIN — LURASIDONE HYDROCHLORIDE 40 MG: 40 TABLET, FILM COATED ORAL at 09:05

## 2023-11-09 RX ADMIN — TOPIRAMATE 200 MG: 100 TABLET ORAL at 17:49

## 2023-11-09 RX ADMIN — WARFARIN SODIUM 4 MG: 4 TABLET ORAL at 09:04

## 2023-11-09 RX ADMIN — METOPROLOL TARTRATE 25 MG: 25 TABLET, FILM COATED ORAL at 09:05

## 2023-11-09 RX ADMIN — LORAZEPAM 1 MG: 1 TABLET ORAL at 12:52

## 2023-11-09 RX ADMIN — TOPIRAMATE 200 MG: 100 TABLET ORAL at 09:03

## 2023-11-09 RX ADMIN — LORAZEPAM 0.5 MG: 2 INJECTION INTRAMUSCULAR; INTRAVENOUS at 17:09

## 2023-11-09 RX ADMIN — NALTREXONE HYDROCHLORIDE 50 MG: 50 TABLET, FILM COATED ORAL at 09:05

## 2023-11-09 RX ADMIN — PANTOPRAZOLE SODIUM 20 MG: 20 TABLET, DELAYED RELEASE ORAL at 06:42

## 2023-11-09 RX ADMIN — BUPROPION HYDROCHLORIDE 300 MG: 150 TABLET, FILM COATED, EXTENDED RELEASE ORAL at 09:05

## 2023-11-09 RX ADMIN — DIVALPROEX SODIUM 750 MG: 500 TABLET, DELAYED RELEASE ORAL at 09:05

## 2023-11-09 RX ADMIN — NYSTATIN: 100000 POWDER TOPICAL at 09:06

## 2023-11-09 RX ADMIN — LEVOTHYROXINE SODIUM 125 MCG: 125 TABLET ORAL at 06:42

## 2023-11-09 RX ADMIN — LURASIDONE HYDROCHLORIDE 40 MG: 40 TABLET, FILM COATED ORAL at 22:44

## 2023-11-09 RX ADMIN — NYSTATIN: 100000 POWDER TOPICAL at 17:49

## 2023-11-09 RX ADMIN — ATOMOXETINE 40 MG: 40 CAPSULE ORAL at 09:05

## 2023-11-09 RX ADMIN — METOPROLOL TARTRATE 25 MG: 25 TABLET, FILM COATED ORAL at 22:44

## 2023-11-09 RX ADMIN — FUROSEMIDE 40 MG: 40 TABLET ORAL at 09:04

## 2023-11-09 RX ADMIN — CLONIDINE HYDROCHLORIDE 0.1 MG: 0.1 TABLET ORAL at 22:44

## 2023-11-09 RX ADMIN — Medication 1000 UNITS: at 09:05

## 2023-11-09 RX ADMIN — CLONIDINE HYDROCHLORIDE 0.1 MG: 0.1 TABLET ORAL at 09:04

## 2023-11-09 NOTE — ASSESSMENT & PLAN NOTE
Presented with lethargy, delirium on admission. Likely in setting of Flexeril overdose. Coma panel, valproic acid levels, ammonia levels wnl  UDS: + THC. UA unremarkable, VBG acceptable. Resolved, back to baseline mentation. A&O x4.

## 2023-11-09 NOTE — PROGRESS NOTES
[address]  Progress Note  Name: Chai Maza  MRN: [id number]  Unit/Bed#: -01 I Date of Admission: 11/6/2023   Date of Service: 11/9/2023 I Hospital Day: 2    Assessment/Plan   * Intentional overdose [facility])  Assessment & Plan  Presented after intentional drug overdose of cyclobenzaprine. Hx mood disorder, PE on warfarin, obesity. Toxicology evaluated in ED, recommended 24 hours monitoring for seizures  Medically has been stable, no events noted on telemetry with nl QTc/QRS on EKGs  Psychiatry evaluated, inpatient psychiatric commitment indicated with 302 signed  Continue Wellbutrin, Strattera, Latuda, Topamax, Depakote, lorazepam PRN  Continue 1:1 observation, supportive care  Remains medically stable/cleared for inpatient psychiatric placement when bed available    Toxic encephalopathy  Assessment & Plan  Presented with lethargy, delirium on admission. Likely in setting of Flexeril overdose. Coma panel, valproic acid levels, ammonia levels wnl  UDS: + THC. UA unremarkable, VBG acceptable. Resolved, back to baseline mentation. A&O x4.      Benign essential hypertension  Assessment & Plan  BP intermittently fluctuating, soft today but otherwise stable  Holding Norvasc, losartan dose for today  Continue home clonidine, Lasix, Lopressor  Monitor for hypotension, adjust parameters PRN    History of pulmonary embolism  Assessment & Plan  History of PE, anticoagulated on Coumadin  INR slightly subtherapeutic, 1.98 today  Continue warfarin 4 mg daily for now  Goal INR 2-3, adjust dose as indicated    Results from last 7 days   Lab Units 11/09/23  0445 11/08/23  0435 11/07/23  0459 11/06/23  0710   INR  1.98* 1.85* 1.98* 2.97*         Bilateral leg edema  Assessment & Plan  History of chronic bilateral lower extremity edema  Clinically euvolemic, at baseline this admission  Continue home PO Lasix dosing     BMI 45.0-49.9, adult (HCC)  Assessment & Plan  Body mass index is 48.26 kg/m². Affects all levels of care  Dietary/lifestyle modifications encouraged    MAG (obstructive sleep apnea)  Assessment & Plan  Continue CPAP qHS, supplies brought in from home    Hypothyroidism  Assessment & Plan  TSH elevated 8.652, nl free T4 this admission  Likely subclinical in setting of medication overdose  Continue home levothyroxine dose             VTE Pharmacologic Prophylaxis: VTE Score: 6 High Risk (Score >/= 5) - Pharmacological DVT Prophylaxis Ordered: warfarin (Coumadin). Sequential Compression Devices Ordered. Patient Centered Rounds: I performed bedside rounds with nursing staff today. Discussions with Specialists or Other Care Team Provider: Case management    Education and Discussions with Family / Patient: Patient declined call to . Total Time Spent on Date of Encounter in care of patient: 35 mins. This time was spent on one or more of the following: performing physical exam; counseling and coordination of care; obtaining or reviewing history; documenting in the medical record; reviewing/ordering tests, medications or procedures; communicating with other healthcare professionals and discussing with patient's family/caregivers. Current Length of Stay: 2 day(s)  Current Patient Status: Inpatient   Certification Statement: The patient will continue to require additional inpatient hospital stay due to pending inpatient psych placement  Discharge Plan: Anticipate discharge later today or tomorrow to inpatient psych. Code Status: Level 1 - Full Code    Subjective:   Patient is seen at bedside this a.m., sitting up in chair, tearful/crying throughout examination. Patient expressed frustration with awaiting inpatient psychiatric placement, no acute events or behavioral disturbances overnight.     Objective:     Vitals:   Temp (24hrs), Av.8 °F (36.6 °C), Min:97.3 °F (36.3 °C), Max:98.3 °F (36.8 °C)    Temp:  [97.3 °F (36.3 °C)-98.3 °F (36.8 °C)] 97.3 °F (36.3 °C)  HR: [70-75] 75  Resp:  [16-20] 16  BP: (102-123)/(43-74) 111/43  SpO2:  [96 %] 96 %  Body mass index is 48.26 kg/m². Input and Output Summary (last 24 hours): Intake/Output Summary (Last 24 hours) at 11/9/2023 1203  Last data filed at 11/9/2023 0845  Gross per 24 hour   Intake 550 ml   Output --   Net 550 ml       Physical Exam:   Physical Exam  Constitutional:       General: She is not in acute distress. Appearance: She is obese. She is not ill-appearing, toxic-appearing or diaphoretic. Cardiovascular:      Rate and Rhythm: Normal rate and regular rhythm. Pulses: Normal pulses. Heart sounds: Normal heart sounds. Pulmonary:      Effort: Pulmonary effort is normal. No respiratory distress. Breath sounds: Normal breath sounds. Abdominal:      General: Bowel sounds are normal. There is no distension. Palpations: Abdomen is soft. Tenderness: There is no abdominal tenderness. Musculoskeletal:         General: No swelling or tenderness. Skin:     General: Skin is warm. Neurological:      General: No focal deficit present. Mental Status: She is alert and oriented to person, place, and time. Psychiatric:      Comments: Crying/tearful, frustrated and anxious. Avoids eye contact by covering face/eyes with hands.           Additional Data:     Labs:  Results from last 7 days   Lab Units 11/09/23  0445 11/06/23  0710   WBC Thousand/uL 9.45 10.36*   HEMOGLOBIN g/dL 13.5 14.6   HEMATOCRIT % 43.1 45.2   PLATELETS Thousands/uL 156 147*   NEUTROS PCT %  --  62   LYMPHS PCT %  --  26   MONOS PCT %  --  9   EOS PCT %  --  1     Results from last 7 days   Lab Units 11/09/23  0445   SODIUM mmol/L 139   POTASSIUM mmol/L 3.7   CHLORIDE mmol/L 106   CO2 mmol/L 23   BUN mg/dL 18   CREATININE mg/dL 0.61   ANION GAP mmol/L 10   CALCIUM mg/dL 9.0   ALBUMIN g/dL 3.7   TOTAL BILIRUBIN mg/dL 0.31   ALK PHOS U/L 47   ALT U/L 10   AST U/L 12*   GLUCOSE RANDOM mg/dL 95     Results from last 7 days Lab Units 11/09/23  0445   INR  1.98*     Results from last 7 days   Lab Units 11/06/23  1817   POC GLUCOSE mg/dl 95               Lines/Drains:  Invasive Devices       None                         Imaging: No pertinent imaging reviewed. Recent Cultures (last 7 days):         Last 24 Hours Medication List:   Current Facility-Administered Medications   Medication Dose Route Frequency Provider Last Rate    amLODIPine  5 mg Oral Daily [facility], PA-C      atoMOXetine  40 mg Oral Daily [facility], PA-C      buPROPion  300 mg Oral Daily [facility], PA-C      cholecalciferol  1,000 Units Oral Daily [facility], PA-C      cloNIDine  0.1 mg Oral Q12H [facility], PA-C      divalproex sodium  750 mg Oral Daily [facility], PA-C      furosemide  40 mg Oral Daily [facility], PA-C      levothyroxine  125 mcg Oral Early Morning [facility], PA-C      LORazepam  0.5 mg Intramuscular Q6H PRN Xi Quiñonez, PA-C      LORazepam  1 mg Oral Q8H PRN Noel Reddy DO      losartan  50 mg Oral Daily [facility], PA-C      lurasidone  40 mg Oral BID [facility], PA-C      metoprolol tartrate  25 mg Oral Q12H [facility], PA-C      naltrexone  50 mg Oral Daily [facility], PA-C      nystatin   Topical BID [facility], PA-C      pantoprazole  20 mg Oral Early Morning [facility], PA-C      topiramate  200 mg Oral BID [facility], PA-C      warfarin  4 mg Oral Daily [facility], PA-C          Today, Patient Was Seen By: Vivi Torres PA-C    **Please Note: This note may have been constructed using a voice recognition system. **

## 2023-11-09 NOTE — PLAN OF CARE
Problem: PAIN - ADULT  Goal: Verbalizes/displays adequate comfort level or baseline comfort level  Description: Interventions:  - Encourage patient to monitor pain and request assistance  - Assess pain using appropriate pain scale  - Administer analgesics based on type and severity of pain and evaluate response  - Implement non-pharmacological measures as appropriate and evaluate response  - Consider cultural and social influences on pain and pain management  - Notify physician/advanced practitioner if interventions unsuccessful or patient reports new pain  Outcome: Progressing     Problem: SAFETY ADULT  Goal: Maintain or return to baseline ADL function  Description: INTERVENTIONS:  -  Assess patient's ability to carry out ADLs; assess patient's baseline for ADL function and identify physical deficits which impact ability to perform ADLs (bathing, care of mouth/teeth, toileting, grooming, dressing, etc.)  - Assess/evaluate cause of self-care deficits   - Assess range of motion  - Assess patient's mobility; develop plan if impaired  - Assess patient's need for assistive devices and provide as appropriate  - Encourage maximum independence but intervene and supervise when necessary  - Involve family in performance of ADLs  - Assess for home care needs following discharge   - Consider OT consult to assist with ADL evaluation and planning for discharge  - Provide patient education as appropriate  Outcome: Progressing     Problem: Risk for Self Injury/Neglect  Goal: Refrain from harming self  Description: Interventions:  - Monitor patient closely, per order  - Develop a trusting relationship  - Supervise medication ingestion, monitor effects and side effects   Outcome: Progressing

## 2023-11-09 NOTE — NURSING NOTE
Patient was frustrated and decided to walk around the hospital.  Fidel Sweet called me to let me know the patient needed something for anxiety. When I went to investigate patient was walking down the hallway and not responding to our requests to return back to her room. I called for security and a control team was called as well. We met patient near [facility] and we were able to calm her down and have her sit in a wheelchair and bring her back to her room. Once in room patient was given ativan PO and she took medication with no issues. Patient is calm and sitting in her room. No restraints needed at this time.

## 2023-11-09 NOTE — PROGRESS NOTES
RAIN HERNANDEZ reviewed chart d/t pt being scheduled for outreach. Pt is currently admitted on 302. IP CM is searching for bed for pt. RAIN HERNANDEZ will continue to review chart and f/u with pt following d/c.

## 2023-11-09 NOTE — ASSESSMENT & PLAN NOTE
History of chronic bilateral lower extremity edema  Clinically euvolemic, at baseline this admission  Continue home PO Lasix dosing

## 2023-11-09 NOTE — ASSESSMENT & PLAN NOTE
BP intermittently fluctuating, soft today but otherwise stable  Holding Norvasc, losartan dose for today  Continue home clonidine, Lasix, Lopressor  Monitor for hypotension, adjust parameters PRN

## 2023-11-09 NOTE — ASSESSMENT & PLAN NOTE
Body mass index is 48.26 kg/m².   Affects all levels of care  Dietary/lifestyle modifications encouraged

## 2023-11-09 NOTE — ASSESSMENT & PLAN NOTE
History of PE, anticoagulated on Coumadin  INR slightly subtherapeutic, 1.98 today  Continue warfarin 4 mg daily for now  Goal INR 2-3, adjust dose as indicated    Results from last 7 days   Lab Units 11/09/23  0445 11/08/23  0435 11/07/23  0459 11/06/23  0710   INR  1.98* 1.85* 1.98* 2.97*

## 2023-11-09 NOTE — ASSESSMENT & PLAN NOTE
TSH elevated 8.652, nl free T4 this admission  Likely subclinical in setting of medication overdose  Continue home levothyroxine dose

## 2023-11-09 NOTE — CASE MANAGEMENT
Case Management Discharge Planning Note    Patient name Kuldeep Cristina  Location /-45 MRN [id number]  : 1970 Date 2023       Current Admission Date: 2023  Current Admission Diagnosis:Intentional overdose [facility])   Patient Active Problem List    Diagnosis Date Noted    Toxic encephalopathy 2023    Candida infection of flexural skin 2023    Lymphedema 10/30/2023    Acute saddle pulmonary embolism ([address]) 10/16/2023    Acute deep vein thrombosis of lower leg, left ([address]) 10/16/2023    Polysubstance abuse ([address]) 2023    Restless leg syndrome 2023    Intentional overdose ([address]) 2023    Tremors of nervous system 2023    Contact dermatitis and eczema 09/15/2023    History of pulmonary embolism 2023    Elevated troponin 2023    Acute respiratory failure ([address]) 2023    Cervicalgia 08/10/2023    Medial epicondylitis of right elbow 2023    Bilateral leg edema 2023    Chronic migraine without aura without status migrainosus, not intractable 2023    Chronic eczematous otitis externa of both ears 2023    Intentional self-harm by unspecified sharp object, sequela ([address]) 2023    Suicidal deliberate poisoning ([address]) 2022    Knee pain, right 2022    Platelets decreased ([address]) 2022    GERD (gastroesophageal reflux disease) 2022    Diarrhea 2022    Ecchymosis 2022    Anxiety 2022    Borderline personality disorder ([address]) 2022    Contusion of elbow 2021    Cognitive impairment 10/07/2021    Substance abuse ([address]) 2021    Potential for cognitive impairment 2021    Mood disorder ([address]) 2021    Chronic tension-type headache, intractable 03/10/2021    History of COVID-19 2020    Memory difficulties 2020    BMI 45.0-49.9, adult ([address]) 2020    Mixed hyperlipidemia 10/30/2019    COPD (chronic obstructive pulmonary disease) ([address]) 2019    Major depressive disorder 05/23/2019    Sjogren's syndrome ([address]) 05/23/2019    Primary osteoarthritis of both knees 08/08/2018    MAG (obstructive sleep apnea) 04/24/2018    Medial epicondylitis of elbow, left 04/03/2018    Overactive bladder 06/08/2017    Schizoaffective disorder, bipolar type ([address]) 03/17/2017    Hypothyroidism 03/17/2017    Restrictive lung disease 02/01/2017    Family history of renal cancer 04/26/2016    Microhematuria 12/23/2015    Yan's esophagus determined by biopsy 10/19/2015    Medical clearance for psychiatric admission 09/14/2015    DDD (degenerative disc disease), lumbar 04/09/2015    Spondylosis of lumbosacral joint without myelopathy 04/09/2015    Urinary incontinence 03/31/2015    Benign essential hypertension 07/02/2014    Edema 03/26/2014    Chronic low back pain 04/07/2012    Hypertension 10/25/2008      LOS (days): 2  Geometric Mean LOS (GMLOS) (days): 3.90  Days to GMLOS:1.9     OBJECTIVE:  Risk of Unplanned Readmission Score: 58.42         Current admission status: Inpatient   Preferred Pharmacy:   [facility], [address] [address]  Phone: [phone] Fax: [phone]    Primary Care Provider: JHONATAN Yanes    Primary Insurance: MEDICARE  Secondary Insurance: [facility]    DISCHARGE DETAILS:  CM contacted [address] [place] [address] at [phone]. Per [facility], they have beds opening up tomorrow. Admissions requested CM resend fax to [phone] to review. DAVID spoke with Annemarie from [address] [place] [address] . Annemarie updated patient is a 37 and CM currently working on placement. CM will update once an available bed is determined.

## 2023-11-09 NOTE — PLAN OF CARE
Problem: PAIN - ADULT  Goal: Verbalizes/displays adequate comfort level or baseline comfort level  Description: Interventions:  - Encourage patient to monitor pain and request assistance  - Assess pain using appropriate pain scale  - Administer analgesics based on type and severity of pain and evaluate response  - Implement non-pharmacological measures as appropriate and evaluate response  - Consider cultural and social influences on pain and pain management  - Notify physician/advanced practitioner if interventions unsuccessful or patient reports new pain  Outcome: Progressing     Problem: SAFETY ADULT  Goal: Patient will remain free of falls  Description: INTERVENTIONS:  - Educate patient/family on patient safety including physical limitations  - Instruct patient to call for assistance with activity   - Consult OT/PT to assist with strengthening/mobility   - Keep Call bell within reach  - Keep bed low and locked with side rails adjusted as appropriate  - Keep care items and personal belongings within reach  - Initiate and maintain comfort rounds  - Make Fall Risk Sign visible to staff  - Offer Toileting every  Hours, in advance of need  - Initiate/Maintain alarm  - Obtain necessary fall risk management equipment:   - Apply yellow socks and bracelet for high fall risk patients  - Consider moving patient to room near nurses station  Outcome: Progressing  Goal: Maintain or return to baseline ADL function  Description: INTERVENTIONS:  -  Assess patient's ability to carry out ADLs; assess patient's baseline for ADL function and identify physical deficits which impact ability to perform ADLs (bathing, care of mouth/teeth, toileting, grooming, dressing, etc.)  - Assess/evaluate cause of self-care deficits   - Assess range of motion  - Assess patient's mobility; develop plan if impaired  - Assess patient's need for assistive devices and provide as appropriate  - Encourage maximum independence but intervene and supervise when necessary  - Involve family in performance of ADLs  - Assess for home care needs following discharge   - Consider OT consult to assist with ADL evaluation and planning for discharge  - Provide patient education as appropriate  Outcome: Progressing  Goal: Maintains/Returns to pre admission functional level  Description: INTERVENTIONS:  - Perform BMAT or MOVE assessment daily.   - Set and communicate daily mobility goal to care team and patient/family/caregiver. - Collaborate with rehabilitation services on mobility goals if consulted  - Perform Range of Motion  times a day. - Reposition patient every  hours. - Dangle patient  times a day  - Stand patient  times a day  - Ambulate patient  times a day  - Out of bed to chair  times a day   - Out of bed for meals times a day  - Out of bed for toileting  - Record patient progress and toleration of activity level   Outcome: Progressing     Problem: DISCHARGE PLANNING  Goal: Discharge to home or other facility with appropriate resources  Description: INTERVENTIONS:  - Identify barriers to discharge w/patient and caregiver  - Arrange for needed discharge resources and transportation as appropriate  - Identify discharge learning needs (meds, wound care, etc.)  - Arrange for interpretive services to assist at discharge as needed  - Refer to Case Management Department for coordinating discharge planning if the patient needs post-hospital services based on physician/advanced practitioner order or complex needs related to functional status, cognitive ability, or social support system  Outcome: Progressing     Problem: Knowledge Deficit  Goal: Patient/family/caregiver demonstrates understanding of disease process, treatment plan, medications, and discharge instructions  Description: Complete learning assessment and assess knowledge base.   Interventions:  - Provide teaching at level of understanding  - Provide teaching via preferred learning methods  Outcome: Progressing Problem: Alteration in Thoughts and Perception  Goal: Treatment Goal: Gain control of psychotic behaviors/thinking, reduce/eliminate presenting symptoms and demonstrate improved reality functioning upon discharge  Outcome: Progressing  Goal: Verbalize thoughts and feelings  Description: Interventions:  - Promote a nonjudgmental and trusting relationship with the patient through active listening and therapeutic communication  - Assess patient's level of functioning, behavior and potential for risk  - Engage patient in 1 on 1 interactions  - Encourage patient to express fears, feelings, frustrations, and discuss symptoms    - [place] patient to reality, help patient recognize reality-based thinking   - Administer medications as ordered and assess for potential side effects  - Provide the patient education related to the signs and symptoms of the illness and desired effects of prescribed medications  Outcome: Progressing  Goal: Refrain from acting on delusional thinking/internal stimuli  Description: Interventions:  - Monitor patient closely, per order   - Utilize least restrictive measures   - Set reasonable limits, give positive feedback for acceptable   - Administer medications as ordered and monitor of potential side effects  Outcome: Progressing  Goal: Agree to be compliant with medication regime, as prescribed and report medication side effects  Description: Interventions:  - Offer appropriate PRN medication and supervise ingestion; conduct AIMS, as needed   Outcome: Progressing  Goal: Attend and participate in unit activities, including therapeutic, recreational, and educational groups  Description: Interventions:  -Encourage Visitation and family involvement in care  Outcome: Progressing  Goal: Recognize dysfunctional thoughts, communicate reality-based thoughts at the time of discharge  Description: Interventions:  - Provide medication and psycho-education to assist patient in compliance and developing insight into his/her illness   Outcome: Progressing  Goal: Complete daily ADLs, including personal hygiene independently, as able  Description: Interventions:  - Observe, teach, and assist patient with ADLS  - Monitor and promote a balance of rest/activity, with adequate nutrition and elimination   Outcome: Progressing

## 2023-11-09 NOTE — CASE MANAGEMENT
Case Management Discharge Planning Note    Patient name Clarissa Jeans  Location /-44 MRN [id number]  : 1970 Date 2023       Current Admission Date: 2023  Current Admission Diagnosis:Intentional overdose [facility])   Patient Active Problem List    Diagnosis Date Noted    Toxic encephalopathy 2023    Candida infection of flexural skin 2023    Lymphedema 10/30/2023    Acute saddle pulmonary embolism ([address]) 10/16/2023    Acute deep vein thrombosis of lower leg, left ([address]) 10/16/2023    Polysubstance abuse ([address]) 2023    Restless leg syndrome 2023    Intentional overdose ([address]) 2023    Tremors of nervous system 2023    Contact dermatitis and eczema 09/15/2023    History of pulmonary embolism 2023    Elevated troponin 2023    Acute respiratory failure ([address]) 2023    Cervicalgia 08/10/2023    Medial epicondylitis of right elbow 2023    Bilateral leg edema 2023    Chronic migraine without aura without status migrainosus, not intractable 2023    Chronic eczematous otitis externa of both ears 2023    Intentional self-harm by unspecified sharp object, sequela ([address]) 2023    Suicidal deliberate poisoning ([address]) 2022    Knee pain, right 2022    Platelets decreased ([address]) 2022    GERD (gastroesophageal reflux disease) 2022    Diarrhea 2022    Ecchymosis 2022    Anxiety 2022    Borderline personality disorder ([address]) 2022    Contusion of elbow 2021    Cognitive impairment 10/07/2021    Substance abuse ([address]) 2021    Potential for cognitive impairment 2021    Mood disorder ([address]) 2021    Chronic tension-type headache, intractable 03/10/2021    History of COVID-19 2020    Memory difficulties 2020    BMI 45.0-49.9, adult ([address]) 2020    Mixed hyperlipidemia 10/30/2019    COPD (chronic obstructive pulmonary disease) ([address]) 2019    Major depressive disorder 05/23/2019    Sjogren's syndrome ([address]) 05/23/2019    Primary osteoarthritis of both knees 08/08/2018    MAG (obstructive sleep apnea) 04/24/2018    Medial epicondylitis of elbow, left 04/03/2018    Overactive bladder 06/08/2017    Schizoaffective disorder, bipolar type ([address]) 03/17/2017    Hypothyroidism 03/17/2017    Restrictive lung disease 02/01/2017    Family history of renal cancer 04/26/2016    Microhematuria 12/23/2015    Yan's esophagus determined by biopsy 10/19/2015    Medical clearance for psychiatric admission 09/14/2015    DDD (degenerative disc disease), lumbar 04/09/2015    Spondylosis of lumbosacral joint without myelopathy 04/09/2015    Urinary incontinence 03/31/2015    Benign essential hypertension 07/02/2014    Edema 03/26/2014    Chronic low back pain 04/07/2012    Hypertension 10/25/2008      LOS (days): 2  Geometric Mean LOS (GMLOS) (days): 3.90  Days to GMLOS:2     OBJECTIVE:  Risk of Unplanned Readmission Score: 58.65         Current admission status: Inpatient   Preferred Pharmacy:   [facility], [address] [facility]  [address] [facility] [address]  Phone: [phone] Fax: [phone]    Primary Care Provider: JHONATAN Mcdowell    Primary Insurance: MEDICARE  Secondary Insurance: [address] [facility] DETAILS:  CM faxed [phone] and [phone] to [facility] at .      CM sent referrals for 302 placement to following facilities:  [facility]-no bed available  [facility] referral for review  [address] [place] [address]-unable to accommodate  [facility]-no available beds  [facility], may have bed tomorrow 11/10

## 2023-11-09 NOTE — ASSESSMENT & PLAN NOTE
Presented after intentional drug overdose of cyclobenzaprine. Hx mood disorder, PE on warfarin, obesity.    Toxicology evaluated in ED, recommended 24 hours monitoring for seizures  Medically has been stable, no events noted on telemetry with nl QTc/QRS on EKGs  Psychiatry evaluated, inpatient psychiatric commitment indicated with 302 signed  Continue Wellbutrin, Strattera, Latuda, Topamax, Depakote, lorazepam PRN  Continue 1:1 observation, supportive care  Remains medically stable/cleared for inpatient psychiatric placement when bed available

## 2023-11-10 ENCOUNTER — HOSPITAL ENCOUNTER (INPATIENT)
Facility: HOSPITAL | Age: 53
LOS: 7 days | Discharge: HOME | End: 2023-11-17
Attending: STUDENT IN AN ORGANIZED HEALTH CARE EDUCATION/TRAINING PROGRAM | Admitting: STUDENT IN AN ORGANIZED HEALTH CARE EDUCATION/TRAINING PROGRAM
Payer: MEDICARE

## 2023-11-10 VITALS
HEIGHT: 66 IN | TEMPERATURE: 97.9 F | SYSTOLIC BLOOD PRESSURE: 141 MMHG | OXYGEN SATURATION: 98 % | WEIGHT: 293 LBS | BODY MASS INDEX: 47.09 KG/M2 | RESPIRATION RATE: 17 BRPM | DIASTOLIC BLOOD PRESSURE: 87 MMHG | HEART RATE: 63 BPM

## 2023-11-10 DIAGNOSIS — R41.89 COGNITIVE IMPAIRMENT: ICD-10-CM

## 2023-11-10 DIAGNOSIS — I10 HYPERTENSION, UNSPECIFIED TYPE: ICD-10-CM

## 2023-11-10 DIAGNOSIS — B37.2 CANDIDA INFECTION OF FLEXURAL SKIN: ICD-10-CM

## 2023-11-10 DIAGNOSIS — E03.9 ACQUIRED HYPOTHYROIDISM: Chronic | ICD-10-CM

## 2023-11-10 DIAGNOSIS — E66.01 MORBID OBESITY WITH BMI OF 45.0-49.9, ADULT (HCC): ICD-10-CM

## 2023-11-10 DIAGNOSIS — I10 HYPERTENSION: ICD-10-CM

## 2023-11-10 DIAGNOSIS — I26.92 ACUTE SADDLE PULMONARY EMBOLISM (HCC): ICD-10-CM

## 2023-11-10 DIAGNOSIS — F25.0 SCHIZOAFFECTIVE DISORDER, BIPOLAR TYPE (HCC): Primary | Chronic | ICD-10-CM

## 2023-11-10 DIAGNOSIS — Z86.711 HISTORY OF PULMONARY EMBOLISM: ICD-10-CM

## 2023-11-10 DIAGNOSIS — I10 BENIGN ESSENTIAL HYPERTENSION: Chronic | ICD-10-CM

## 2023-11-10 DIAGNOSIS — I82.4Z2 ACUTE DEEP VEIN THROMBOSIS OF LOWER LEG, LEFT (HCC): ICD-10-CM

## 2023-11-10 DIAGNOSIS — E55.9 VITAMIN D DEFICIENCY, UNSPECIFIED: ICD-10-CM

## 2023-11-10 DIAGNOSIS — Z00.8 MEDICAL CLEARANCE FOR PSYCHIATRIC ADMISSION: ICD-10-CM

## 2023-11-10 LAB
INR PPP: 1.96 (ref 0.84–1.19)
PROTHROMBIN TIME: 22 SECONDS (ref 11.6–14.5)
VALPROATE SERPL-MCNC: 23 UG/ML (ref 50–100)

## 2023-11-10 PROCEDURE — 80164 ASSAY DIPROPYLACETIC ACD TOT: CPT | Performed by: PSYCHIATRY & NEUROLOGY

## 2023-11-10 PROCEDURE — 85610 PROTHROMBIN TIME: CPT | Performed by: PHYSICIAN ASSISTANT

## 2023-11-10 PROCEDURE — 99239 HOSP IP/OBS DSCHRG MGMT >30: CPT | Performed by: PHYSICIAN ASSISTANT

## 2023-11-10 PROCEDURE — 99232 SBSQ HOSP IP/OBS MODERATE 35: CPT | Performed by: PSYCHIATRY & NEUROLOGY

## 2023-11-10 PROCEDURE — 99232 SBSQ HOSP IP/OBS MODERATE 35: CPT | Performed by: PHYSICIAN ASSISTANT

## 2023-11-10 RX ORDER — BUPROPION HYDROCHLORIDE 150 MG/1
300 TABLET ORAL DAILY
Status: CANCELLED | OUTPATIENT
Start: 2023-11-11

## 2023-11-10 RX ORDER — MELATONIN
1000 DAILY
Status: CANCELLED | OUTPATIENT
Start: 2023-11-11

## 2023-11-10 RX ORDER — LORAZEPAM 2 MG/ML
1 INJECTION INTRAMUSCULAR
Status: DISCONTINUED | OUTPATIENT
Start: 2023-11-10 | End: 2023-11-17 | Stop reason: HOSPADM

## 2023-11-10 RX ORDER — HYDROXYZINE HYDROCHLORIDE 25 MG/1
25 TABLET, FILM COATED ORAL
Status: DISCONTINUED | OUTPATIENT
Start: 2023-11-10 | End: 2023-11-17 | Stop reason: HOSPADM

## 2023-11-10 RX ORDER — CLONIDINE HYDROCHLORIDE 0.1 MG/1
0.1 TABLET ORAL EVERY 12 HOURS SCHEDULED
Status: DISCONTINUED | OUTPATIENT
Start: 2023-11-10 | End: 2023-11-17 | Stop reason: HOSPADM

## 2023-11-10 RX ORDER — IBUPROFEN 400 MG/1
200 TABLET ORAL EVERY 6 HOURS PRN
Status: DISCONTINUED | OUTPATIENT
Start: 2023-11-10 | End: 2023-11-17 | Stop reason: HOSPADM

## 2023-11-10 RX ORDER — OLANZAPINE 10 MG/2ML
7.5 INJECTION, POWDER, FOR SOLUTION INTRAMUSCULAR EVERY 6 HOURS PRN
Status: DISCONTINUED | OUTPATIENT
Start: 2023-11-10 | End: 2023-11-10

## 2023-11-10 RX ORDER — OLANZAPINE 2.5 MG/1
5 TABLET ORAL
Status: CANCELLED | OUTPATIENT
Start: 2023-11-10

## 2023-11-10 RX ORDER — IBUPROFEN 600 MG/1
600 TABLET ORAL EVERY 8 HOURS PRN
Status: CANCELLED | OUTPATIENT
Start: 2023-11-10

## 2023-11-10 RX ORDER — WATER 10 ML/10ML
INJECTION INTRAMUSCULAR; INTRAVENOUS; SUBCUTANEOUS
Status: COMPLETED
Start: 2023-11-10 | End: 2023-11-10

## 2023-11-10 RX ORDER — LORAZEPAM 1 MG/1
1 TABLET ORAL
Status: DISCONTINUED | OUTPATIENT
Start: 2023-11-10 | End: 2023-11-17 | Stop reason: HOSPADM

## 2023-11-10 RX ORDER — LORAZEPAM 0.5 MG/1
0.5 TABLET ORAL EVERY 8 HOURS PRN
Status: CANCELLED | OUTPATIENT
Start: 2023-11-10

## 2023-11-10 RX ORDER — TRAZODONE HYDROCHLORIDE 50 MG/1
50 TABLET ORAL
Status: CANCELLED | OUTPATIENT
Start: 2023-11-10

## 2023-11-10 RX ORDER — HYDROXYZINE HYDROCHLORIDE 25 MG/1
25 TABLET, FILM COATED ORAL
Status: CANCELLED | OUTPATIENT
Start: 2023-11-10

## 2023-11-10 RX ORDER — MELATONIN
1000 DAILY
Status: DISCONTINUED | OUTPATIENT
Start: 2023-11-11 | End: 2023-11-17 | Stop reason: HOSPADM

## 2023-11-10 RX ORDER — OXCARBAZEPINE 150 MG/1
150 TABLET, FILM COATED ORAL EVERY 12 HOURS SCHEDULED
Status: DISCONTINUED | OUTPATIENT
Start: 2023-11-10 | End: 2023-11-13

## 2023-11-10 RX ORDER — OXCARBAZEPINE 150 MG/1
150 TABLET, FILM COATED ORAL EVERY 12 HOURS SCHEDULED
Status: CANCELLED | OUTPATIENT
Start: 2023-11-10

## 2023-11-10 RX ORDER — TRAZODONE HYDROCHLORIDE 50 MG/1
50 TABLET ORAL
Status: DISCONTINUED | OUTPATIENT
Start: 2023-11-10 | End: 2023-11-14

## 2023-11-10 RX ORDER — IBUPROFEN 400 MG/1
400 TABLET ORAL EVERY 6 HOURS PRN
Status: CANCELLED | OUTPATIENT
Start: 2023-11-10

## 2023-11-10 RX ORDER — NALTREXONE HYDROCHLORIDE 50 MG/1
50 TABLET, FILM COATED ORAL DAILY
Status: CANCELLED | OUTPATIENT
Start: 2023-11-11

## 2023-11-10 RX ORDER — IBUPROFEN 400 MG/1
400 TABLET ORAL EVERY 6 HOURS PRN
Status: DISCONTINUED | OUTPATIENT
Start: 2023-11-10 | End: 2023-11-17 | Stop reason: HOSPADM

## 2023-11-10 RX ORDER — HYDROXYZINE 50 MG/1
50 TABLET, FILM COATED ORAL
Status: DISCONTINUED | OUTPATIENT
Start: 2023-11-10 | End: 2023-11-17 | Stop reason: HOSPADM

## 2023-11-10 RX ORDER — NALTREXONE HYDROCHLORIDE 50 MG/1
50 TABLET, FILM COATED ORAL DAILY
Status: DISCONTINUED | OUTPATIENT
Start: 2023-11-11 | End: 2023-11-17 | Stop reason: HOSPADM

## 2023-11-10 RX ORDER — HYDROXYZINE HYDROCHLORIDE 25 MG/1
50 TABLET, FILM COATED ORAL
Status: CANCELLED | OUTPATIENT
Start: 2023-11-10

## 2023-11-10 RX ORDER — OLANZAPINE 5 MG/1
5 TABLET ORAL
Status: DISCONTINUED | OUTPATIENT
Start: 2023-11-10 | End: 2023-11-17 | Stop reason: HOSPADM

## 2023-11-10 RX ORDER — IBUPROFEN 200 MG
200 TABLET ORAL EVERY 6 HOURS PRN
Status: CANCELLED | OUTPATIENT
Start: 2023-11-10

## 2023-11-10 RX ORDER — OXCARBAZEPINE 150 MG/1
150 TABLET, FILM COATED ORAL EVERY 12 HOURS SCHEDULED
Status: DISCONTINUED | OUTPATIENT
Start: 2023-11-10 | End: 2023-11-10 | Stop reason: HOSPADM

## 2023-11-10 RX ORDER — WARFARIN SODIUM 5 MG/1
5 TABLET ORAL DAILY
Status: DISCONTINUED | OUTPATIENT
Start: 2023-11-10 | End: 2023-11-10 | Stop reason: HOSPADM

## 2023-11-10 RX ORDER — LURASIDONE HYDROCHLORIDE 40 MG/1
40 TABLET, FILM COATED ORAL
Status: DISCONTINUED | OUTPATIENT
Start: 2023-11-10 | End: 2023-11-10 | Stop reason: HOSPADM

## 2023-11-10 RX ORDER — LORAZEPAM 0.5 MG/1
0.5 TABLET ORAL EVERY 8 HOURS PRN
Status: DISCONTINUED | OUTPATIENT
Start: 2023-11-10 | End: 2023-11-10 | Stop reason: HOSPADM

## 2023-11-10 RX ORDER — OLANZAPINE 2.5 MG/1
7.5 TABLET ORAL
Status: DISCONTINUED | OUTPATIENT
Start: 2023-11-10 | End: 2023-11-10 | Stop reason: HOSPADM

## 2023-11-10 RX ORDER — LORAZEPAM 2 MG/ML
0.5 INJECTION INTRAMUSCULAR ONCE
Status: COMPLETED | OUTPATIENT
Start: 2023-11-10 | End: 2023-11-10

## 2023-11-10 RX ORDER — OLANZAPINE 10 MG/2ML
5 INJECTION, POWDER, FOR SOLUTION INTRAMUSCULAR
Status: DISCONTINUED | OUTPATIENT
Start: 2023-11-10 | End: 2023-11-17 | Stop reason: HOSPADM

## 2023-11-10 RX ORDER — AMOXICILLIN 250 MG
1 CAPSULE ORAL DAILY PRN
Status: DISCONTINUED | OUTPATIENT
Start: 2023-11-10 | End: 2023-11-17 | Stop reason: HOSPADM

## 2023-11-10 RX ORDER — OLANZAPINE 10 MG/2ML
7.5 INJECTION, POWDER, FOR SOLUTION INTRAMUSCULAR ONCE
Status: DISCONTINUED | OUTPATIENT
Start: 2023-11-10 | End: 2023-11-10

## 2023-11-10 RX ORDER — AMOXICILLIN 250 MG
1 CAPSULE ORAL DAILY PRN
Status: CANCELLED | OUTPATIENT
Start: 2023-11-10

## 2023-11-10 RX ORDER — OLANZAPINE 10 MG/2ML
5 INJECTION, POWDER, FOR SOLUTION INTRAMUSCULAR
Status: CANCELLED | OUTPATIENT
Start: 2023-11-10

## 2023-11-10 RX ORDER — CLONIDINE HYDROCHLORIDE 0.1 MG/1
0.1 TABLET ORAL EVERY 12 HOURS SCHEDULED
Status: CANCELLED | OUTPATIENT
Start: 2023-11-10

## 2023-11-10 RX ORDER — POLYETHYLENE GLYCOL 3350 17 G/17G
17 POWDER, FOR SOLUTION ORAL DAILY PRN
Status: DISCONTINUED | OUTPATIENT
Start: 2023-11-10 | End: 2023-11-17 | Stop reason: HOSPADM

## 2023-11-10 RX ORDER — LORAZEPAM 2 MG/ML
1 INJECTION INTRAMUSCULAR
Status: CANCELLED | OUTPATIENT
Start: 2023-11-10

## 2023-11-10 RX ORDER — OLANZAPINE 2.5 MG/1
2.5 TABLET ORAL
Status: CANCELLED | OUTPATIENT
Start: 2023-11-10

## 2023-11-10 RX ORDER — BUPROPION HYDROCHLORIDE 300 MG/1
300 TABLET ORAL DAILY
Status: DISCONTINUED | OUTPATIENT
Start: 2023-11-11 | End: 2023-11-17 | Stop reason: HOSPADM

## 2023-11-10 RX ORDER — POLYETHYLENE GLYCOL 3350 17 G/17G
17 POWDER, FOR SOLUTION ORAL DAILY PRN
Status: CANCELLED | OUTPATIENT
Start: 2023-11-10

## 2023-11-10 RX ORDER — TOPIRAMATE 100 MG/1
200 TABLET, FILM COATED ORAL 2 TIMES DAILY
Status: CANCELLED | OUTPATIENT
Start: 2023-11-10

## 2023-11-10 RX ORDER — OLANZAPINE 2.5 MG/1
2.5 TABLET, FILM COATED ORAL
Status: DISCONTINUED | OUTPATIENT
Start: 2023-11-10 | End: 2023-11-17 | Stop reason: HOSPADM

## 2023-11-10 RX ORDER — OLANZAPINE 10 MG/2ML
7.5 INJECTION, POWDER, FOR SOLUTION INTRAMUSCULAR
Status: DISCONTINUED | OUTPATIENT
Start: 2023-11-10 | End: 2023-11-10 | Stop reason: HOSPADM

## 2023-11-10 RX ORDER — IBUPROFEN 600 MG/1
600 TABLET ORAL EVERY 8 HOURS PRN
Status: DISCONTINUED | OUTPATIENT
Start: 2023-11-10 | End: 2023-11-17 | Stop reason: HOSPADM

## 2023-11-10 RX ORDER — LORAZEPAM 1 MG/1
1 TABLET ORAL
Status: CANCELLED | OUTPATIENT
Start: 2023-11-10

## 2023-11-10 RX ORDER — TOPIRAMATE 100 MG/1
200 TABLET, FILM COATED ORAL 2 TIMES DAILY
Status: DISCONTINUED | OUTPATIENT
Start: 2023-11-11 | End: 2023-11-17 | Stop reason: HOSPADM

## 2023-11-10 RX ORDER — LURASIDONE HYDROCHLORIDE 20 MG/1
20 TABLET, FILM COATED ORAL
Status: COMPLETED | OUTPATIENT
Start: 2023-11-10 | End: 2023-11-10

## 2023-11-10 RX ADMIN — Medication 1000 UNITS: at 09:03

## 2023-11-10 RX ADMIN — DIVALPROEX SODIUM 750 MG: 500 TABLET, DELAYED RELEASE ORAL at 09:02

## 2023-11-10 RX ADMIN — OLANZAPINE 7.5 MG: 10 INJECTION, POWDER, FOR SOLUTION INTRAMUSCULAR at 13:59

## 2023-11-10 RX ADMIN — TOPIRAMATE 200 MG: 100 TABLET ORAL at 17:00

## 2023-11-10 RX ADMIN — WARFARIN SODIUM 5 MG: 5 TABLET ORAL at 09:03

## 2023-11-10 RX ADMIN — LOSARTAN POTASSIUM 50 MG: 50 TABLET, FILM COATED ORAL at 09:02

## 2023-11-10 RX ADMIN — NALTREXONE HYDROCHLORIDE 50 MG: 50 TABLET, FILM COATED ORAL at 09:03

## 2023-11-10 RX ADMIN — AMLODIPINE BESYLATE 5 MG: 5 TABLET ORAL at 09:02

## 2023-11-10 RX ADMIN — LORAZEPAM 0.5 MG: 2 INJECTION INTRAMUSCULAR; INTRAVENOUS at 12:31

## 2023-11-10 RX ADMIN — OXCARBAZEPINE 150 MG: 150 TABLET, FILM COATED ORAL at 21:32

## 2023-11-10 RX ADMIN — LURASIDONE HYDROCHLORIDE 40 MG: 40 TABLET, FILM COATED ORAL at 09:03

## 2023-11-10 RX ADMIN — CLONIDINE HYDROCHLORIDE 0.1 MG: 0.1 TABLET ORAL at 21:32

## 2023-11-10 RX ADMIN — METOPROLOL TARTRATE 25 MG: 25 TABLET, FILM COATED ORAL at 09:03

## 2023-11-10 RX ADMIN — LEVOTHYROXINE SODIUM 125 MCG: 125 TABLET ORAL at 06:36

## 2023-11-10 RX ADMIN — LORAZEPAM 0.5 MG: 2 INJECTION INTRAMUSCULAR; INTRAVENOUS at 13:46

## 2023-11-10 RX ADMIN — WATER 10 ML: 1 INJECTION INTRAMUSCULAR; INTRAVENOUS; SUBCUTANEOUS at 14:23

## 2023-11-10 RX ADMIN — LURASIDONE HYDROCHLORIDE 40 MG: 40 TABLET, FILM COATED ORAL at 16:58

## 2023-11-10 RX ADMIN — TOPIRAMATE 200 MG: 100 TABLET ORAL at 09:03

## 2023-11-10 RX ADMIN — BUPROPION HYDROCHLORIDE 300 MG: 150 TABLET, FILM COATED, EXTENDED RELEASE ORAL at 09:02

## 2023-11-10 RX ADMIN — LURASIDONE HYDROCHLORIDE 20 MG: 20 TABLET, COATED ORAL at 11:56

## 2023-11-10 RX ADMIN — OXCARBAZEPINE 150 MG: 150 TABLET, FILM COATED ORAL at 14:23

## 2023-11-10 RX ADMIN — NYSTATIN: 100000 POWDER TOPICAL at 09:01

## 2023-11-10 RX ADMIN — CLONIDINE HYDROCHLORIDE 0.1 MG: 0.1 TABLET ORAL at 09:02

## 2023-11-10 RX ADMIN — ATOMOXETINE 40 MG: 40 CAPSULE ORAL at 09:02

## 2023-11-10 RX ADMIN — PANTOPRAZOLE SODIUM 20 MG: 20 TABLET, DELAYED RELEASE ORAL at 06:36

## 2023-11-10 RX ADMIN — LORAZEPAM 1 MG: 1 TABLET ORAL at 12:16

## 2023-11-10 RX ADMIN — FUROSEMIDE 40 MG: 40 TABLET ORAL at 09:02

## 2023-11-10 NOTE — CASE MANAGEMENT
Case Management Discharge Planning Note    Patient name Aly Curtis  Location /-07 MRN [id number]  : 1970 Date 11/10/2023       Current Admission Date: 2023  Current Admission Diagnosis:Intentional overdose [facility])   Patient Active Problem List    Diagnosis Date Noted    Toxic encephalopathy 2023    Candida infection of flexural skin 2023    Lymphedema 10/30/2023    Acute saddle pulmonary embolism ([address]) 10/16/2023    Acute deep vein thrombosis of lower leg, left ([address]) 10/16/2023    Polysubstance abuse ([address]) 2023    Restless leg syndrome 2023    Intentional overdose ([address]) 2023    Tremors of nervous system 2023    Contact dermatitis and eczema 09/15/2023    History of pulmonary embolism 2023    Elevated troponin 2023    Acute respiratory failure ([address]) 2023    Cervicalgia 08/10/2023    Medial epicondylitis of right elbow 2023    Bilateral leg edema 2023    Chronic migraine without aura without status migrainosus, not intractable 2023    Chronic eczematous otitis externa of both ears 2023    Intentional self-harm by unspecified sharp object, sequela ([address]) 2023    Suicidal deliberate poisoning ([address]) 2022    Knee pain, right 2022    Platelets decreased ([address]) 2022    GERD (gastroesophageal reflux disease) 2022    Diarrhea 2022    Ecchymosis 2022    Anxiety 2022    Borderline personality disorder ([address]) 2022    Contusion of elbow 2021    Cognitive impairment 10/07/2021    Substance abuse ([address]) 2021    Potential for cognitive impairment 2021    Mood disorder ([address]) 2021    Chronic tension-type headache, intractable 03/10/2021    History of COVID-19 2020    Memory difficulties 2020    BMI 45.0-49.9, adult ([address]) 2020    Mixed hyperlipidemia 10/30/2019    COPD (chronic obstructive pulmonary disease) ([address]) 2019    Major depressive disorder 05/23/2019    Sjogren's syndrome ([address]) 05/23/2019    Primary osteoarthritis of both knees 08/08/2018    MAG (obstructive sleep apnea) 04/24/2018    Medial epicondylitis of elbow, left 04/03/2018    Overactive bladder 06/08/2017    Schizoaffective disorder, bipolar type ([address]) 03/17/2017    Hypothyroidism 03/17/2017    Restrictive lung disease 02/01/2017    Family history of renal cancer 04/26/2016    Microhematuria 12/23/2015    Yan's esophagus determined by biopsy 10/19/2015    Medical clearance for psychiatric admission 09/14/2015    DDD (degenerative disc disease), lumbar 04/09/2015    Spondylosis of lumbosacral joint without myelopathy 04/09/2015    Urinary incontinence 03/31/2015    Benign essential hypertension 07/02/2014    Edema 03/26/2014    Chronic low back pain 04/07/2012    Hypertension 10/25/2008      LOS (days): 3  Geometric Mean LOS (GMLOS) (days): 3.90  Days to GMLOS:0.9     OBJECTIVE:  Risk of Unplanned Readmission Score: 59.08         Current admission status: Inpatient   Preferred Pharmacy:   [address], [address] [address]  Phone: [phone] Fax: [phone]    Primary Care Provider: JHONATAN Hercules    Primary Insurance: MEDICARE  Secondary Insurance: [address] [facility] DETAILS:  CM contacted the following facilities for inpatient 302 placement:  [facility]-No, cannot accept due to unable to get to court on Monday  [facility]-No, due to CPAP  [facility]-No Beds  [facility]-No 302 before todays date 11/10  [facility]-no bed today, can call back tomorrow after 1100  [facility]-unable to accept due to medical complexity  [facility]-Not in service anymore  -Faxed and will review  [facility], on que for an open bed. CM contact information provided.    [facility]-denied  [facility]-no beds    [facility] able to [facility] at [address] Dr. Winnie Rodriguez accepted. Unit report number . CM sent referral via [employer] for BLS, requested [phone]. Waiting for  time. Nursing and SLIM updated with plan of care. CM contacted Gail at [phone] for coordination of benefits. Representative stated [facility] will need to call once patient is at facility.                                                                                                          Accepting Facility Name, [address] [place] [address]  Receiving Facility/Agency Phone Number: [phone]

## 2023-11-10 NOTE — NURSING NOTE
At [phone] patient noted to become more agitated. IM ativan given as ordered at 1346. After ativan injection, patient started to pick things up in the room and throw them. She is upset that she is not being discharged from the hospital today. She started walking to hallway. Control team called at this time. Patient kicked wet floor sign and proceeded to stairwell. Patient transported back to room in wheelchair. IM zyprexa given as ordered at 1400. Patient is now laying in her bed watching tv. She is awake/alert, calm, and cooperative at this time. Will continue to monitor.

## 2023-11-10 NOTE — PLAN OF CARE
Problem: PAIN - ADULT  Goal: Verbalizes/displays adequate comfort level or baseline comfort level  Description: Interventions:  - Encourage patient to monitor pain and request assistance  - Assess pain using appropriate pain scale  - Administer analgesics based on type and severity of pain and evaluate response  - Implement non-pharmacological measures as appropriate and evaluate response  - Consider cultural and social influences on pain and pain management  - Notify physician/advanced practitioner if interventions unsuccessful or patient reports new pain  Outcome: Progressing     Problem: Risk for Self Injury/Neglect  Goal: Refrain from harming self  Description: Interventions:  - Monitor patient closely, per order  - Develop a trusting relationship  - Supervise medication ingestion, monitor effects and side effects   Outcome: Progressing

## 2023-11-10 NOTE — ASSESSMENT & PLAN NOTE
BP intermittently fluctuating, overall stable today  Continue home clonidine, Lasix, Lopressor, Norvasc, losartan  Monitor for hypotension, adjust parameters PRN

## 2023-11-10 NOTE — DISCHARGE SUMMARY
[address]  Discharge- Anabell Ceja 1970, 47 y.o. female MRN: [id number]  Unit/Bed#: [facility] Encounter: [id number]  Primary Care Provider: JHONATAN Escobar   Date and time admitted to hospital: 11/6/2023  6:45 AM    * Intentional overdose [facility])  Assessment & Plan  Presented after intentional drug overdose, admitted to taking 40 tablets of 10 mg Flexeril. Hx schizoaffective disorder. Toxicology evaluated in ED, admitted for monitoring for seizures, anticholinergic adverse effects  Medically has been stable, no events noted on telemetry with nl QTc/QRS on EKGs  Psychiatry evaluated, inpatient psychiatric commitment indicated with 302 signed  Continue Wellbutrin, Strattera, Latuda, Topamax, Depakote, lorazepam PRN  Continue 1:1 observation, high risk for elopement with prior attempts  Remains medically stable/cleared for inpatient psychiatric placement when bed available    Toxic encephalopathy  Assessment & Plan  Presented with lethargy, delirium on admission. Likely in setting of Flexeril overdose. Coma panel, valproic acid levels, ammonia levels wnl on admission  Repeat valproic acid levels decreased at 23, can monitor outpatient PRN  UDS: + THC. UA unremarkable, VBG acceptable. Resolved, back to baseline mentation. A&O x4.      Benign essential hypertension  Assessment & Plan  BP intermittently fluctuating, overall stable today  Continue home clonidine, Lasix, Lopressor, Norvasc, losartan  Monitor for hypotension, adjust parameters PRN    History of pulmonary embolism  Assessment & Plan  History of PE, anticoagulated on Coumadin  INR slightly subtherapeutic, 1.96 today  Increase warfarin 4->5 mg daily for now  Goal INR 2-3, adjust dose as indicated    Results from last 7 days   Lab Units 11/10/23  0505 11/09/23  0445 11/08/23  0435 11/07/23  0459   INR  1.96* 1.98* 1.85* 1.98*         Bilateral leg edema  Assessment & Plan  History of chronic bilateral lower extremity edema  Clinically euvolemic, at baseline this admission  Continue home PO Lasix dosing     BMI 45.0-49.9, adult (HCC)  Assessment & Plan  Body mass index is 48.26 kg/m². Affects all levels of care  Dietary/lifestyle modifications encouraged    MAG (obstructive sleep apnea)  Assessment & Plan  Continue CPAP qHS, supplies brought in from home    Hypothyroidism  Assessment & Plan  TSH elevated 8.652, nl free T4 this admission  Likely subclinical in setting of medication overdose  Continue home levothyroxine dose      Medical Problems       Resolved Problems  Date Reviewed: 11/10/2023   None       Discharging Physician / Practitioner: Cass Flowers PA-C  PCP: Kwesi Pineda, [address] [place] [address]  Admission Date:   Admission Orders (From admission, onward)       Ordered        11/07/23 1628  Inpatient Admission  Once            11/06/23 1135  Place in Observation  Once            Signed and Held  ED TO DIFFERENT CAMPUS [address] UNIT or INPATIENT MEDICAL UNIT to [facility] (using Discharge Readmit Navigator) - Admit Patient to [facility] Unit  Once                          Discharge Date: 11/10/23    Consultations During Hospital Stay:  Toxicology  Psychiatry    Procedures Performed:   None    Significant Findings / Test Results:   None    Incidental Findings:   None    Test Results Pending at Discharge (will require follow up): None     Outpatient Tests Requested:   Follow-up with psychiatry outpatient for ongoing management of schizoaffective disorder  Follow-up with PCP for management of chronic conditions  Obtain routine PT/INR labs within 3-5 days from discharge, then can check weekly    Complications: None    Reason for Admission: Intentional overdose of Flexeril or cyclobenzaprine    Hospital Course:   Riley Garnett is a 47 y.o. female patient, PMH schizoaffective disorder, PE anticoagulated on Coumadin, chronic bilateral lower extremity edema, obesity, MAG on CPAP qHS, hypothyroidism, HTN, who originally presented to the hospital on 11/6/2023 due to intentional drug overdose of cyclobenzaprine or Flexeril. Patient was brought in after admitting to taking 40 tablets of Flexeril 10 mg, lethargic and delirious but hemodynamically stable on arrival to ED. Toxicology was consulted, patient was admitted for monitoring of anticholinergic adverse effects. Extensive lab work was overall unremarkable, patient did not have any adverse effects or seizure activity, QTc on EKGs were not prolonged, mentation had returned to baseline. Psychiatry evaluated patient, recommending inpatient psychiatric treatment with 302 signed. Patient was placed on 1:1 observation, noted multiple attempted elopements, behavioral disturbances and agitation requiring medication adjustment. Ultimately patient was deemed medically stable for inpatient psychiatric treatment, accepted at [address] [place] [address]. Please see above list of diagnoses and related plan for additional information. *Please see separate progress note from today for physical exam findings. *    Discussion with Family: Patient declined call to . Discharge instructions/Information to patient and family:   See after visit summary for information provided to patient and family. Provisions for Follow-Up Care:  See after visit summary for information related to follow-up care and any pertinent home health orders. Disposition:   Inpatient Psychiatry at [address] [place] [address]    Planned Readmission: Readmission to [address] [place] [address]     Discharge Statement:  I spent 45 minutes discharging the patient. This time was spent on the day of discharge. I had direct contact with the patient on the day of discharge. Greater than 50% of the total time was spent examining patient, answering all patient questions, arranging and discussing plan of care with patient as well as directly providing post-discharge instructions.   Additional time then spent on discharge activities. Discharge Medications:  See after visit summary for reconciled discharge medications provided to patient and/or family.       **Please Note: This note may have been constructed using a voice recognition system**

## 2023-11-10 NOTE — NURSING NOTE
Patient asked for anxiety medication. PO Ativan given as ordered. 15 minutes after that the patient became agitated punching the wall with her right arm multiple times. Control team called at this time. IM ativan given as ordered. Soon after ativan injection patient remained calm and cooperative.

## 2023-11-10 NOTE — ASSESSMENT & PLAN NOTE
History of PE, anticoagulated on Coumadin  INR slightly subtherapeutic, 1.96 today  Increase warfarin 4->5 mg daily for now  Goal INR 2-3, adjust dose as indicated    Results from last 7 days   Lab Units 11/10/23  0505 11/09/23  0445 11/08/23  0435 11/07/23  0459   INR  1.96* 1.98* 1.85* 1.98*

## 2023-11-10 NOTE — PROGRESS NOTES
Progress Note - Keaton Sanders 47 y.o. female MRN: [id number]  Unit/Bed#: -01 Encounter: [id number]          I came to see the patient for continuity of care. Per staff patient attempted to elope again yesterday. Patient initially was not verbally answering questions, her eyes were downcast.  She reports being upset regarding being hospitalized. She was tearful during the evaluation. She verbalized apathetic statements such as "fuck it". She was perseverative regarding returning home and did not appear to acknowledge the seriousness of her overdose. She reports adequate sleep and appetite. She denies current suicidal or homicidal ideation, plan, or intent. When assessing for hallucinations patient stated that "the other night" she felt that she was "talking a lot when falling asleep". She denies auditory or visual hallucinations. As interview progressed patient became somewhat more engaged and cooperative. When asked about her home Depakote regimen patient states that she does not remember her dosages. States that she was taking "1 to 2" tablets of Depakote at night and does not remember how much she was taking in the morning. Reports that she had been experiencing tremors due to the Depakote and states that the plan by her psychiatrist was to taper off this medication and onto a different mood stabilizer. She reports having experienced tremors in her hands and legs and states that she has been experiencing those tremors today. States that she does not want to take Depakote anymore. She reports feeling depressed. She reports that she is interested in medication change to improve her condition.       Behavior over the last 24 hours: Slight progression  Sleep: normal  Appetite: normal  Medication side effects: None reported  ROS: no complaints verbalized    Mental Status Evaluation:  Appearance:  appears stated age, overweight , tattooed , sitting upright in chair, and wearing glasses   Behavior:  Initially minimally cooperative, cooperation increased as interview progressed. Initially downcast eyes-eye contact slightly improved as interview progressed. She appeared to be restless and tremulous in her feet and handsAnd tremulous in her feet bilaterally and right hand   Speech:  soft and scant   Mood:  depressed   Affect:  Dysphoric and tearful   Language: naming objects and repeating phrases   Thought Process:  perserverative   Associations: perseverative   Thought Content:  Negativistic thinking   Perceptual Disturbances: Denies auditory or visual hallucinations and Does not appear to be responding to internal stimuli. Appears to be internally preoccupied   Risk Potential: Status post intentional overdose of Flexeril. She denies current suicidal or homicidal ideation, plan, intent   Sensorium:  person, place, and situation   Memory:  recent and remote memory grossly intact   Cognition:  recent and remote memory grossly intact   Consciousness:  alert and awake    Attention: attention span and concentration were age appropriate   Intellect: within normal limits   Fund of Knowledge: awareness of current events: Fair, past history: Fair, and vocabulary: Fair   Insight:  poor   Judgment: poor   Muscle Strength and Tone: Not assessed   Gait/Station: Not assessed   Motor Activity: Patient appears to be intermittently tremulous in bilateral feet and right hand. This appeared to stop once noting this to the patient         Assessment/Plan  Hipolito Rodas is a 47 y.o. female with past medical history of hypertension, obesity, history of pulmonary embolism, MAG, schizoaffective disorder bipolar type who presented to the ED after an intentional overdose and is admitted for the same. Psychiatry saw the patient for psychiatric consultation on 11/7/2023 and has been following with the patient during her hospitalization. Patient had reported having taken an overdose of 41 Flexeril 10 mg pills. She had initially signed a 201 for voluntary patient psychiatric commitment however per primary team patient had subsequently tried to elope and was placed in restraints and given as needed Ativan IM. 302 was subsequently initiated and upheld. Patient endorses current depression. She made apathetic statements and verbalized negativistic thinking. She denies current suicidal or homicidal ideation, plan, or intent. She was perseverative regarding returning home and did not appear to acknowledge the seriousness of her overdose. When assessing for hallucinations patient stated that recently having felt that she was "talking a lot when falling asleep" though denies auditory or visual hallucinations. Discussed patient's Depakote she states that she is unsure of her home dosage. Patient's valproic acid level from this morning was 23. Patient has been receiving a decreased dosage of 750 mg qhs compared to her dosing in the chart of 750 mg daily and 1000 mg qhs. The initial medication list from ACT team did not appear to be complete and the Depakote dosing was not listed. Requested case management to contact ACT team in regards to obtaining full medication list again. Patient reports that she had been experiencing tremors due to the Depakote and states that the plan by her psychiatrist was to taper off this medication onto a different mood stabilizer. She reports having experienced tremors in her hands and legs and states that she has been experiencing those tremors today. States that she does not want to take Depakote anymore. Discussed plan to discontinue Depakote.   Discussed consideration for starting another mood stabilizer such as oxcarbazepine however discussed plan to at this point trying to improve patient's conditions by optimizing her current medications as opposed to starting a new medication (and therefore possibly increasing risk of polypharmacy) and patient verbalized interest, understanding, and agreement. Also patient's presentation appears to be currently depressed and less hypomanic compared to initial evaluation. Therefore discussed plan to increase dosage of Latuda for depression and mood (Darrol Blade is a second generation antipsychotic and generally this class of medications have mood stabilizing properties) and patient verbalized interest, understanding, and was in agreement with the plan. Patient continues to require inpatient psychiatric hospitalization. 303 paperwork completed. Diagnosis:  Mood disorder unspecified  Differential diagnosis includes bipolar disorder, depressed versus mixed, severe versus schizoaffective disorder bipolar type    Recommended Treatment:   Continue medical management  Continue one-to-one observation  Patient continues to require inpatient psychiatric hospitalization  Patient is currently under a 302 involuntary inpatient psychiatric commitment  303 paperwork completed   Increase Latuda to 60 mg daily with breakfast and 40 mg daily with dinner starting tomorrow  Will give one dose of Latuda 20 mg daily with lunch today to increase dosage of Latuda starting today  Continue Wellbutrin  mg daily  Continue Strattera 40 mg daily  Continue Topamax 200 mg BID  Valproic acid level today is 23. Patient has been receiving a decreased dosage of 750 mg qhs during her current hospitalization, compared to her dosing in the chart of 750 mg daily and 1000 mg qhs. The initial medication list from ACT team did not appear to be complete and the Depakote dosing was not listed. Requested case management to contact ACT team in regards to obtaining full medication list again. Patient reports that she had been experiencing tremors due to the Depakote and states that the plan by her psychiatrist was to taper off this medication onto a different mood stabilizer. She reports having experienced tremors in her hands and legs and states that she has been experiencing those tremors today. States that she does not want to take Depakote anymore. Discussed plan to discontinue Depakote. Discontinue Depakote  If patient exhibits hypomanic/manic symptoms, can consider starting patient on another mood stabilizer such as oxcarbazepine 150 mg BID  Discussed with the primary team  I will follow-up with patient on Monday if she continues to be hospitalized. Please contact [facility] over the weekend for psychiatric follow up  For overnights and weekends please contact [facility] for psychiatric purposes     Addendum 1:04 PM  Received [employer] message from primary team stating that patient tried to hurt herself and was banging her arms on the wall, control team was called and patient was given Ativan prn. We discussed need for improved mood stabilization and treatment of agitation. Due to this recent episode of agitation, impulsivity, and unstable mood discussed recommendation to start oxcarbazepine 150 mg BID for mood stabilization and to order prn medications for agitation. Start Oxcarbazepine 150 mg BID   Recommend monitoring for decreases in blood cell counts (CBC), for hyponatremia, and for rash (maculopapular eruption, Nitish-Agustin syndrome) with this medication  Zyprexa 7.5 mg every 6 hours prn severe agitation with Zyprexa 7.5 mg IM alternative if oral route is not available     Do not administer without 1 hour of lorazepam (and vice versa)  D/C Ativan IM prn agitation  Decrease lorazepam prn from 1 to 0.5 mg q8hr prn anxiety to avoid patient receiving an increased dosage of benzodiazepines     Medications: all current active meds have been reviewed. See recommended treatment above. Risks, benefits and possible side effects of Medications:     Risks, benefits, and possible side effects of medications explained to patient and patient verbalizes understanding. Labs: I have personally reviewed all pertinent laboratory results.      I have personally reviewed all pertinent laboratory/tests results. Labs in last 72 hours:   Recent Labs     11/09/23  0445 11/10/23  0505   WBC 9.45  --    RBC 4.54  --    HGB 13.5  --    HCT 43.1  --      --    RDW 14.0  --    SODIUM 139  --    K 3.7  --      --    CO2 23  --    BUN 18  --    CREATININE 0.61  --    GLUC 95  --    CALCIUM 9.0  --    AST 12*  --    ALT 10  --    ALKPHOS 47  --    TP 6.6  --    ALB 3.7  --    TBILI 0.31  --    VALPROICTOT  --  23*         David Rousseau,   11/10/23      This note has been constructed using a voice recognition system. There may be translation, syntax,  or grammatical errors. If you have any questions, please contact the dictating provider.

## 2023-11-10 NOTE — ASSESSMENT & PLAN NOTE
Presented after intentional drug overdose, admitted to taking 40 tablets of 10 mg Flexeril. Hx schizoaffective disorder.    Toxicology evaluated in ED, admitted for monitoring for seizures, anticholinergic adverse effects  Medically has been stable, no events noted on telemetry with nl QTc/QRS on EKGs  Psychiatry evaluated, inpatient psychiatric commitment indicated with 302 signed  Continue Wellbutrin, Strattera, Latuda, Topamax, Depakote, lorazepam PRN  Continue 1:1 observation, high risk for elopement with prior attempts  Remains medically stable/cleared for inpatient psychiatric placement when bed available

## 2023-11-10 NOTE — CASE MANAGEMENT
Case Management Discharge Planning Note    Patient name Linda Carranza  Location /-65 MRN [id number]  : 1970 Date 11/10/2023       Current Admission Date: 2023  Current Admission Diagnosis:Intentional overdose [facility])   Patient Active Problem List    Diagnosis Date Noted    Toxic encephalopathy 2023    Candida infection of flexural skin 2023    Lymphedema 10/30/2023    Acute saddle pulmonary embolism ([address]) 10/16/2023    Acute deep vein thrombosis of lower leg, left ([address]) 10/16/2023    Polysubstance abuse ([address]) 2023    Restless leg syndrome 2023    Intentional overdose ([address]) 2023    Tremors of nervous system 2023    Contact dermatitis and eczema 09/15/2023    History of pulmonary embolism 2023    Elevated troponin 2023    Acute respiratory failure ([address]) 2023    Cervicalgia 08/10/2023    Medial epicondylitis of right elbow 2023    Bilateral leg edema 2023    Chronic migraine without aura without status migrainosus, not intractable 2023    Chronic eczematous otitis externa of both ears 2023    Intentional self-harm by unspecified sharp object, sequela ([address]) 2023    Suicidal deliberate poisoning ([address]) 2022    Knee pain, right 2022    Platelets decreased ([address]) 2022    GERD (gastroesophageal reflux disease) 2022    Diarrhea 2022    Ecchymosis 2022    Anxiety 2022    Borderline personality disorder ([address]) 2022    Contusion of elbow 2021    Cognitive impairment 10/07/2021    Substance abuse ([address]) 2021    Potential for cognitive impairment 2021    Mood disorder ([address]) 2021    Chronic tension-type headache, intractable 03/10/2021    History of COVID-19 2020    Memory difficulties 2020    BMI 45.0-49.9, adult ([address]) 2020    Mixed hyperlipidemia 10/30/2019    COPD (chronic obstructive pulmonary disease) ([facility] [address] [facility]) 2019    Major depressive disorder 05/23/2019    Sjogren's syndrome ([address]) 05/23/2019    Primary osteoarthritis of both knees 08/08/2018    MAG (obstructive sleep apnea) 04/24/2018    Medial epicondylitis of elbow, left 04/03/2018    Overactive bladder 06/08/2017    Schizoaffective disorder, bipolar type ([address]) 03/17/2017    Hypothyroidism 03/17/2017    Restrictive lung disease 02/01/2017    Family history of renal cancer 04/26/2016    Microhematuria 12/23/2015    Yan's esophagus determined by biopsy 10/19/2015    Medical clearance for psychiatric admission 09/14/2015    DDD (degenerative disc disease), lumbar 04/09/2015    Spondylosis of lumbosacral joint without myelopathy 04/09/2015    Urinary incontinence 03/31/2015    Benign essential hypertension 07/02/2014    Edema 03/26/2014    Chronic low back pain 04/07/2012    Hypertension 10/25/2008      LOS (days): 3  Geometric Mean LOS (GMLOS) (days): 3.90  Days to GMLOS:0.9     OBJECTIVE:  Risk of Unplanned Readmission Score: 60.35         Current admission status: Inpatient   Preferred Pharmacy:   [facility], [address] [facility]  [address] [facility] [address]  Phone: [phone] Fax: [phone]    Primary Care Provider: JHONATAN William    Primary Insurance: MEDICARE  Secondary Insurance: [facility]    DISCHARGE DETAILS:  CM updated per [employer], patient's  time is 1900 via [address]. CM updated crisis liaison and nursing with transport time.                                                                                                      Accepting Facility Name, [address] [place] [address]  Receiving Facility/Agency Phone Number: [phone]

## 2023-11-10 NOTE — ASSESSMENT & PLAN NOTE
Presented with lethargy, delirium on admission. Likely in setting of Flexeril overdose. Coma panel, valproic acid levels, ammonia levels wnl on admission  Repeat valproic acid levels decreased at 23, can monitor outpatient PRN  UDS: + THC. UA unremarkable, VBG acceptable. Resolved, back to baseline mentation. A&O x4.

## 2023-11-11 PROBLEM — F43.21 GRIEF: Status: ACTIVE | Noted: 2023-11-11

## 2023-11-11 LAB
25(OH)D3 SERPL-MCNC: 25.7 NG/ML (ref 30–100)
ALBUMIN SERPL BCP-MCNC: 3.6 G/DL (ref 3.5–5)
ALP SERPL-CCNC: 48 U/L (ref 34–104)
ALT SERPL W P-5'-P-CCNC: 12 U/L (ref 7–52)
ANION GAP SERPL CALCULATED.3IONS-SCNC: 9 MMOL/L
AST SERPL W P-5'-P-CCNC: 13 U/L (ref 13–39)
ATRIAL RATE: 62 BPM
ATRIAL RATE: 67 BPM
ATRIAL RATE: 70 BPM
ATRIAL RATE: 72 BPM
ATRIAL RATE: 74 BPM
ATRIAL RATE: 77 BPM
ATRIAL RATE: 81 BPM
BASOPHILS # BLD AUTO: 0.04 THOUSANDS/ÂΜL (ref 0–0.1)
BASOPHILS NFR BLD AUTO: 1 % (ref 0–1)
BILIRUB SERPL-MCNC: 0.29 MG/DL (ref 0.2–1)
BUN SERPL-MCNC: 18 MG/DL (ref 5–25)
CALCIUM SERPL-MCNC: 9 MG/DL (ref 8.4–10.2)
CHLORIDE SERPL-SCNC: 107 MMOL/L (ref 96–108)
CO2 SERPL-SCNC: 24 MMOL/L (ref 21–32)
CREAT SERPL-MCNC: 0.61 MG/DL (ref 0.6–1.3)
EOSINOPHIL # BLD AUTO: 0.11 THOUSAND/ÂΜL (ref 0–0.61)
EOSINOPHIL NFR BLD AUTO: 1 % (ref 0–6)
ERYTHROCYTE [DISTWIDTH] IN BLOOD BY AUTOMATED COUNT: 14 % (ref 11.6–15.1)
FOLATE SERPL-MCNC: 20.1 NG/ML
GFR SERPL CREATININE-BSD FRML MDRD: 104 ML/MIN/1.73SQ M
GLUCOSE P FAST SERPL-MCNC: 96 MG/DL (ref 65–99)
GLUCOSE SERPL-MCNC: 96 MG/DL (ref 65–140)
HCT VFR BLD AUTO: 43.4 % (ref 34.8–46.1)
HGB BLD-MCNC: 13.9 G/DL (ref 11.5–15.4)
IMM GRANULOCYTES # BLD AUTO: 0.08 THOUSAND/UL (ref 0–0.2)
IMM GRANULOCYTES NFR BLD AUTO: 1 % (ref 0–2)
INR PPP: 2.03 (ref 0.84–1.19)
LYMPHOCYTES # BLD AUTO: 2.09 THOUSANDS/ÂΜL (ref 0.6–4.47)
LYMPHOCYTES NFR BLD AUTO: 24 % (ref 14–44)
MAGNESIUM SERPL-MCNC: 2.1 MG/DL (ref 1.9–2.7)
MCH RBC QN AUTO: 30 PG (ref 26.8–34.3)
MCHC RBC AUTO-ENTMCNC: 32 G/DL (ref 31.4–37.4)
MCV RBC AUTO: 94 FL (ref 82–98)
MONOCYTES # BLD AUTO: 0.8 THOUSAND/ÂΜL (ref 0.17–1.22)
MONOCYTES NFR BLD AUTO: 9 % (ref 4–12)
NEUTROPHILS # BLD AUTO: 5.75 THOUSANDS/ÂΜL (ref 1.85–7.62)
NEUTS SEG NFR BLD AUTO: 64 % (ref 43–75)
NRBC BLD AUTO-RTO: 0 /100 WBCS
P AXIS: 0 DEGREES
P AXIS: 30 DEGREES
P AXIS: 32 DEGREES
P AXIS: 32 DEGREES
P AXIS: 33 DEGREES
P AXIS: 62 DEGREES
P AXIS: 64 DEGREES
PHOSPHATE SERPL-MCNC: 3.5 MG/DL (ref 2.7–4.5)
PLATELET # BLD AUTO: 150 THOUSANDS/UL (ref 149–390)
PMV BLD AUTO: 10.5 FL (ref 8.9–12.7)
POTASSIUM SERPL-SCNC: 3.8 MMOL/L (ref 3.5–5.3)
PR INTERVAL: 177 MS
PR INTERVAL: 188 MS
PR INTERVAL: 192 MS
PR INTERVAL: 192 MS
PR INTERVAL: 198 MS
PR INTERVAL: 200 MS
PR INTERVAL: 201 MS
PROT SERPL-MCNC: 6.3 G/DL (ref 6.4–8.4)
PROTHROMBIN TIME: 23.3 SECONDS (ref 11.6–14.5)
QRS AXIS: -13 DEGREES
QRS AXIS: -18 DEGREES
QRS AXIS: -25 DEGREES
QRS AXIS: -26 DEGREES
QRS AXIS: -26 DEGREES
QRS AXIS: -27 DEGREES
QRS AXIS: -34 DEGREES
QRSD INTERVAL: 88 MS
QRSD INTERVAL: 88 MS
QRSD INTERVAL: 90 MS
QRSD INTERVAL: 90 MS
QRSD INTERVAL: 92 MS
QRSD INTERVAL: 97 MS
QRSD INTERVAL: 98 MS
QT INTERVAL: 372 MS
QT INTERVAL: 374 MS
QT INTERVAL: 378 MS
QT INTERVAL: 382 MS
QT INTERVAL: 384 MS
QT INTERVAL: 386 MS
QT INTERVAL: 396 MS
QTC INTERVAL: 401 MS
QTC INTERVAL: 401 MS
QTC INTERVAL: 406 MS
QTC INTERVAL: 413 MS
QTC INTERVAL: 415 MS
QTC INTERVAL: 432 MS
QTC INTERVAL: 446 MS
RBC # BLD AUTO: 4.64 MILLION/UL (ref 3.81–5.12)
SODIUM SERPL-SCNC: 140 MMOL/L (ref 135–147)
T WAVE AXIS: 25 DEGREES
T WAVE AXIS: 31 DEGREES
T WAVE AXIS: 36 DEGREES
T WAVE AXIS: 39 DEGREES
T WAVE AXIS: 43 DEGREES
T WAVE AXIS: 44 DEGREES
T WAVE AXIS: 51 DEGREES
TSH SERPL DL<=0.05 MIU/L-ACNC: 2.44 UIU/ML (ref 0.45–4.5)
VENTRICULAR RATE: 62 BPM
VENTRICULAR RATE: 67 BPM
VENTRICULAR RATE: 70 BPM
VENTRICULAR RATE: 72 BPM
VENTRICULAR RATE: 74 BPM
VENTRICULAR RATE: 77 BPM
VENTRICULAR RATE: 80 BPM
VIT B12 SERPL-MCNC: 418 PG/ML (ref 180–914)
WBC # BLD AUTO: 8.87 THOUSAND/UL (ref 4.31–10.16)

## 2023-11-11 PROCEDURE — 84443 ASSAY THYROID STIM HORMONE: CPT

## 2023-11-11 PROCEDURE — 85610 PROTHROMBIN TIME: CPT | Performed by: PHYSICIAN ASSISTANT

## 2023-11-11 PROCEDURE — 99223 1ST HOSP IP/OBS HIGH 75: CPT | Performed by: STUDENT IN AN ORGANIZED HEALTH CARE EDUCATION/TRAINING PROGRAM

## 2023-11-11 PROCEDURE — 85025 COMPLETE CBC W/AUTO DIFF WBC: CPT

## 2023-11-11 PROCEDURE — 82607 VITAMIN B-12: CPT

## 2023-11-11 PROCEDURE — 99221 1ST HOSP IP/OBS SF/LOW 40: CPT | Performed by: PHYSICIAN ASSISTANT

## 2023-11-11 PROCEDURE — 83735 ASSAY OF MAGNESIUM: CPT

## 2023-11-11 PROCEDURE — 93010 ELECTROCARDIOGRAM REPORT: CPT | Performed by: INTERNAL MEDICINE

## 2023-11-11 PROCEDURE — 82746 ASSAY OF FOLIC ACID SERUM: CPT

## 2023-11-11 PROCEDURE — 84100 ASSAY OF PHOSPHORUS: CPT

## 2023-11-11 PROCEDURE — 82306 VITAMIN D 25 HYDROXY: CPT

## 2023-11-11 PROCEDURE — 80053 COMPREHEN METABOLIC PANEL: CPT

## 2023-11-11 RX ORDER — ALBUTEROL SULFATE 90 UG/1
2 AEROSOL, METERED RESPIRATORY (INHALATION) EVERY 4 HOURS PRN
Status: DISCONTINUED | OUTPATIENT
Start: 2023-11-11 | End: 2023-11-17 | Stop reason: HOSPADM

## 2023-11-11 RX ORDER — NYSTATIN 100000 [USP'U]/G
POWDER TOPICAL 2 TIMES DAILY
Status: DISCONTINUED | OUTPATIENT
Start: 2023-11-11 | End: 2023-11-17 | Stop reason: HOSPADM

## 2023-11-11 RX ORDER — FUROSEMIDE 20 MG/1
20 TABLET ORAL DAILY
Status: DISCONTINUED | OUTPATIENT
Start: 2023-11-11 | End: 2023-11-17 | Stop reason: HOSPADM

## 2023-11-11 RX ORDER — LEVOTHYROXINE SODIUM 0.12 MG/1
125 TABLET ORAL
Status: DISCONTINUED | OUTPATIENT
Start: 2023-11-11 | End: 2023-11-17 | Stop reason: HOSPADM

## 2023-11-11 RX ORDER — WARFARIN SODIUM 2 MG/1
4 TABLET ORAL
Status: DISCONTINUED | OUTPATIENT
Start: 2023-11-11 | End: 2023-11-12

## 2023-11-11 RX ORDER — LOSARTAN POTASSIUM 50 MG/1
50 TABLET ORAL DAILY
Status: DISCONTINUED | OUTPATIENT
Start: 2023-11-11 | End: 2023-11-16

## 2023-11-11 RX ORDER — ATOMOXETINE 40 MG/1
40 CAPSULE ORAL DAILY
Status: DISCONTINUED | OUTPATIENT
Start: 2023-11-11 | End: 2023-11-16

## 2023-11-11 RX ADMIN — HYDROXYZINE HYDROCHLORIDE 50 MG: 50 TABLET, FILM COATED ORAL at 15:57

## 2023-11-11 RX ADMIN — CLONIDINE HYDROCHLORIDE 0.1 MG: 0.1 TABLET ORAL at 08:13

## 2023-11-11 RX ADMIN — LOSARTAN POTASSIUM 50 MG: 50 TABLET, FILM COATED ORAL at 11:27

## 2023-11-11 RX ADMIN — NALTREXONE HYDROCHLORIDE 50 MG: 50 TABLET, FILM COATED ORAL at 08:13

## 2023-11-11 RX ADMIN — TOPIRAMATE 200 MG: 100 TABLET, FILM COATED ORAL at 08:13

## 2023-11-11 RX ADMIN — CHOLECALCIFEROL TAB 25 MCG (1000 UNIT) 1000 UNITS: 25 TAB at 08:13

## 2023-11-11 RX ADMIN — TOPIRAMATE 200 MG: 100 TABLET, FILM COATED ORAL at 17:11

## 2023-11-11 RX ADMIN — ATOMOXETINE 40 MG: 40 CAPSULE ORAL at 14:04

## 2023-11-11 RX ADMIN — FUROSEMIDE 20 MG: 20 TABLET ORAL at 11:27

## 2023-11-11 RX ADMIN — LEVOTHYROXINE SODIUM 125 MCG: 125 TABLET ORAL at 11:27

## 2023-11-11 RX ADMIN — LURASIDONE HYDROCHLORIDE 60 MG: 40 TABLET, COATED ORAL at 08:12

## 2023-11-11 RX ADMIN — BUPROPION HYDROCHLORIDE 300 MG: 300 TABLET, EXTENDED RELEASE ORAL at 08:13

## 2023-11-11 RX ADMIN — OXCARBAZEPINE 150 MG: 150 TABLET, FILM COATED ORAL at 08:13

## 2023-11-11 RX ADMIN — WARFARIN SODIUM 4 MG: 2 TABLET ORAL at 17:11

## 2023-11-11 RX ADMIN — CLONIDINE HYDROCHLORIDE 0.1 MG: 0.1 TABLET ORAL at 21:12

## 2023-11-11 RX ADMIN — NYSTATIN 1 APPLICATION: 100000 POWDER TOPICAL at 11:29

## 2023-11-11 RX ADMIN — OXCARBAZEPINE 150 MG: 150 TABLET, FILM COATED ORAL at 21:12

## 2023-11-11 NOTE — CONSULTS
[facility]  Consult  Name: Anatoly Eckert 47 y.o. female I MRN: [id number]  Unit/Bed#: [facility] Monday 550-71 I Date of Admission: 11/10/2023   Date of Service: 11/11/2023 I Hospital Day: 1    Inpatient consult for Medical Clearance for [facility] patient  Consult performed by: Sincere Nguyễn PA-C  Consult ordered by: JHONATAN Starkey        Assessment/Plan   Medical clearance for psychiatric admission  Assessment & Plan  Patient is medically cleared for admission to [facility] and treatment of underlying psychiatric illness based on available results  No acute medical needs. Medicine will sign off. Please call with additional questions or concerns    Schizoaffective disorder, bipolar type [facility])  Assessment & Plan  Patient admitted to the [facility] on an involuntary 303 commitment basis secondary to depression and suicide attempt by overdose on 40 tablets of Flexeril  Initially seen at [address] [place] [address] for evaluation  Was recently hospitalized at [facility] from 9/29 - 10/6    History of pulmonary embolism  Assessment & Plan  INR remains slightly therapeutic with most recent 2.03  Continue Coumadin 4 mg daily  INR in a.m.     COPD (chronic obstructive pulmonary disease) ([facility])  Assessment & Plan  Continue albuterol as needed, not in acute exacerbation    Sjogren's syndrome ([address])  Assessment & Plan  Maintained on [place] as an outpatient however not on hospital formulary    Overactive bladder  Assessment & Plan  Maintained on oxybutynin 5 mg twice daily    Hypothyroidism  Assessment & Plan  Continue levothyroxine 125 mcg daily    Edema  Assessment & Plan  With known history of chronic bilateral lower extremity edema  Maintained on furosemide daily    Benign essential hypertension  Assessment & Plan  With known history of hypertension  Blood pressure currently stable  Maintained on losartan 50 mg daily, metoprolol 25 mg twice daily, clonidine 0.1 mg twice daily, and amlodipine 5 mg daily  Hold amlodipine and metoprolol at this time given well controlled Bps without  Restart amlodipine and metoprolol if needed       ECT Clearance:  History of recent seizure or stroke:  No  History of pheochromocytoma:  No  History of active bleeding (Intracranial hemorrhage, aneurysm or AVM):  No  History of metallic implants in the head or neck:  No  History of increased intracranial pressure with mass effect:  No  History of cervical spine fracture or instability:  No  Has the patient had an EKG in the past 3 months? Results: Normal Sinus Rhythm  What is the QTc interval? 406    Based on above criteria, Patient is medically cleared for ECT should it be recommended. Recommendations for Discharge:  Louise Oro will sign off - please call with questions or concerns. Follow up with PCP upon discharge. Counseling / Coordination of Care Time: 30 minutes. Greater than 50% of total time spent on patient counseling and coordination of care. Collaboration of Care: Were Recommendations Directly Discussed with Primary Treatment Team? - Yes     History of Present Illness:    Amy Euceda is a 47 y.o. female with a past history of schizoaffective disorder, pulmonary embolism anticoagulated with Coumadin admission, chronic bilateral lower extremity edema, obesity, MAG on CPAP, hypothyroidism, hypertension who is originally admitted to the psychiatric service due to suicide attempt. We are consulted for medical clearance for psychiatric hospitalization and medical management. The patient was originally brought to [address] [place] [address] on 11/6/2023 due to an intentional drug overdose of 40 tablets of Flexeril. Toxicology was consulted at that time however she remained stable and did not exhibit any symptoms of anticholinergic effects.   Ultimately, psychiatry evaluated the patient and recommended inpatient psychiatric treatment, 302 was signed and the patient was ultimately transferred to [facility]. Of note, she was recently admitted to [address] [place] [address] from 9/29 - 10/6. Review of Systems:    Review of Systems   Constitutional:  Negative for appetite change, chills, fever and unexpected weight change. HENT:  Negative for hearing loss, rhinorrhea and sore throat. Eyes:  Negative for pain, redness and visual disturbance. Respiratory:  Negative for cough, chest tightness and shortness of breath. Cardiovascular:  Negative for chest pain, palpitations and leg swelling. Gastrointestinal:  Negative for constipation, diarrhea, nausea and vomiting. Endocrine: Negative for polydipsia, polyphagia and polyuria. Genitourinary:  Negative for dysuria, frequency and urgency. Neurological:  Negative for dizziness, light-headedness and headaches. Psychiatric/Behavioral:  Positive for suicidal ideas. The patient is nervous/anxious.         Past Medical and Surgical History:     Past Medical History:   Diagnosis Date    Anxiety     Arthritis     oa cassandra knees    Asthma     good control- no medications    Yan's esophagus     Bipolar affective disorder ([address])     Cannabis abuse with unspecified 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[facility])     Chronic pain disorder     lumbar    COPD (chronic obstructive pulmonary disease) ([facility])     CPAP (continuous positive airway pressure) dependence     Degenerative disc disease at L5-S1 level     Deliberate self-cutting     9/15/22per pt has not done recently-- does see a therapist and psychiatrist regularly    Depression 09/16/2008    Disease of thyroid gland     hypo    MARTINEZ (dyspnea on exertion)     per pt "has had this with exertion and not new"    Drug overdose 10/28/2008    suicide attempt and again drug overdose 7/2022-- [facility] floor and than transferred to [facility]    Dysphagia     Dyspnea     Edema     BLE    Family history of blood clots     GERD (gastroesophageal reflux disease)     Headache(784.0)     Headache, tension-type     Hemorrhage of rectum and anus 10/02/2017    Formatting of this note might be different from the original.  Added automatically from request for surgery [id number]    High blood pressure     History of COVID-19 12/30/2020    Symptoms started on 12/30/2020 and then got worse. Today she is feeling a little bit better. She is a candidate for monoclonal antibody infusion and set for 1/6/21 @ 1pm at [facility]. 01/07/21 - telemedicine -     Hyperlipidemia     Hypertension     Knee pain, bilateral     Especially right    Medical marijuana use     Memory loss     Migraines     Obesity     Overactive bladder     Pulmonary emboli (HCC)     Sjogren's disease ([address])     Sleep apnea     Stress incontinence     Suicidal ideations     Teeth missing     Tremor     per pt Tremors of Right Leg and both Arms    Visual impairment     Wears glasses        Past Surgical History:   Procedure Laterality Date    BREAST CYST EXCISION Right 1989    CARPAL TUNNEL RELEASE Left     CHOLECYSTECTOMY  05/2003    Laparoscopic    COLONOSCOPY      01/12/2009    DILATION AND CURETTAGE OF UTERUS      ELBOW SURGERY Left     x2.  No hardware    ESOPHAGOGASTRODUODENOSCOPY      FOOT SURGERY Left     Plantar fasciotomy    HERNIA REPAIR      HYSTERECTOMY      laporoscopic, partial    KNEE ARTHROSCOPY Bilateral     OOPHORECTOMY Left 10/2015    NV GASTROCNEMIUS RECESSION Left 02/24/2021    Procedure: RECESSION GASTROC OPEN;  Surgeon: Ernesto Bustillo DPM;  Location: [address] [place] [address] MAIN OR;  Service: Podiatry    NV RPR UMBILICAL HRNA 5 YRS/> REDUCIBLE N/A 04/24/2019    Procedure: REPAIR HERNIA UMBILICAL LAPAROSCOPIC W/ ROBOTICS;  Surgeon: Meredith Sanderson MD;  Location: [facility] Main OR;  Service: General    NV SPHINCTEROTOMY ANAL DIVISION SPHINCTER SPX N/A 08/31/2018    Procedure: EUA, LEFT PARTIAL INTERNAL SPHINCTEROTOMY;  Surgeon: Gulshan Garnett MD;  Location: [address] [place] [address] MAIN OR;  Service: Colorectal    NV TNOT ELBOW LATERAL/MEDIAL DEBRIDE OPEN TDN RPR Left 09/20/2022    Procedure: RELEASE EPICONDYLAR ELBOW MEDIAL;  Surgeon: Cody De Oliveira MD;  Location: [facility] MAIN OR;  Service: Orthopedics    REDUCTION MAMMAPLASTY Bilateral 1999    REPAIR LACERATION Left     left hand-5/18/2009    REPLACEMENT TOTAL KNEE Right     ROTATOR CUFF REPAIR Left     TONSILECTOMY AND ADNOIDECTOMY      US GUIDED MSK PROCEDURE  04/22/2021    VASCULAR SURGERY      VEIN LIGATION Bilateral     WISDOM TOOTH EXTRACTION         Meds/Allergies:    PTA meds:   Prior to Admission Medications   Prescriptions Last Dose Informant Patient Reported? Taking?    Diclofenac Sodium (VOLTAREN) 1 % Not Taking Self No No   Sig: Apply 2 g topically 4 (four) times a day   Patient not taking: Reported on 10/24/2023   RABEprazole (ACIPHEX) 20 MG tablet Past Week Self Yes Yes   Sig: Take 20 mg by mouth 2 (two) times a day   albuterol (Ventolin HFA) 90 mcg/act inhaler Past Month Self No Yes   Sig: Inhale 2 puffs every 6 (six) hours as needed for wheezing   amLODIPine (NORVASC) 5 mg tablet 11/10/2023 Self No Yes   Sig: TAKE 1 TABLET (5 MG TOTAL) BY MOUTH DAILY   atoMOXetine (STRATTERA) 40 mg capsule 11/10/2023 Self Yes Yes   buPROPion (WELLBUTRIN XL) 300 mg 24 hr tablet 11/10/2023 Self No Yes   Sig: Take 1 tablet (300 mg total) by mouth daily   cholecalciferol (VITAMIN D3) 1,000 units tablet 11/10/2023 Self No Yes   Sig: Take 1 tablet (1,000 Units total) by mouth daily   cloNIDine (CATAPRES) 0.1 mg tablet 11/10/2023 Self No Yes   Sig: Take 1 tablet (0.1 mg total) by mouth every 12 (twelve) hours   divalproex sodium (DEPAKOTE) 250 mg DR tablet 11/10/2023 Self No Yes   Sig: Take 3 tablets (750 mg total) by mouth in the morning   divalproex sodium (DEPAKOTE) 500 mg DR tablet Past Week Self No Yes   Sig: Take 2 tablets (1,000 mg total) by mouth daily at bedtime   estradiol (ESTRACE) 0.5 MG tablet Not Taking Self No No   Sig: Take 1 tablet (0.5 mg total) by mouth daily   Patient not taking: Reported on 11/10/2023   furosemide (LASIX) 40 mg tablet 11/10/2023  No Yes   Sig: Take 1 tablet (40 mg total) by mouth daily   haloperidol decanoate (Haldol Decanoate) 50 mg/mL injection Past Week Self Yes Yes   Sig: every 30 (thirty) days   hydrocortisone 2.5 % ointment  Self No No   Sig: Apply topically 2 (two) times a day   Patient not taking: Reported on 11/3/2023   levothyroxine (Euthyrox) 125 mcg tablet 11/10/2023 Self No Yes   Sig: Take 1 tablet (125 mcg total) by mouth daily in the early morning   lidocaine (LIDODERM) 5 % Not Taking Self No No   Sig: Apply 1 patch topically over 12 hours daily Remove & Discard patch within 12 hours or as directed by MD   Patient not taking: Reported on 11/10/2023   losartan (COZAAR) 50 mg tablet 11/10/2023 Self No Yes   Sig: TAKE ONE TABLET BY MOUTH DAILY   lurasidone (LATUDA) 40 mg tablet 11/10/2023 Self No Yes   Sig: Take 1 tablet (40 mg total) by mouth daily with breakfast   lurasidone (LATUDA) 40 mg tablet 11/10/2023 Self No Yes   Sig: Take 1 tablet (40 mg total) by mouth daily with dinner   memantine (NAMENDA) 10 mg tablet  Self No No   Sig: Take 1 tablet (10 mg total) by mouth daily   metoprolol tartrate (LOPRESSOR) 25 mg tablet 11/10/2023  No Yes   Sig: TAKE ONE TABLET BY MOUTH EVERY 12 HOURS   naltrexone (REVIA) 50 mg tablet 11/10/2023 Self No Yes   Sig: Take 1 tablet (50 mg total) by mouth daily   nystatin (MYCOSTATIN) powder 11/10/2023  No Yes   Sig: Apply topically 4 (four) times a day for 5 days   oxybutynin (DITROPAN) 5 mg tablet  Self No No   Sig: Take 1 tablet (5 mg total) by mouth 2 (two) times a day   pantoprazole (PROTONIX) 20 mg tablet 11/10/2023 Self No Yes   Sig: Take 1 tablet (20 mg total) by mouth daily in the early morning   pilocarpine (SALAGEN) 5 mg tablet Past Week Self Yes Yes   Sig: Take 5 mg by mouth 2 (two) times a day   rOPINIRole (REQUIP) 1 mg tablet Past Week Self Yes Yes   Si mg   topiramate (TOPAMAX) 200 MG tablet 11/10/2023 Self No Yes   Sig: Take 1 tablet (200 mg total) by mouth 2 (two) times a day   trospium chloride (SANCTURA) 20 mg tablet Past Week Self Yes Yes   Sig: Take 20 mg by mouth 2 (two) times a day   warfarin (COUMADIN) 4 mg tablet 2023 Self No Yes   Sig: Take 1 tablet (4 mg total) by mouth daily   warfarin (Coumadin) 2 mg tablet   No No   Sig: Take as directed      Facility-Administered Medications: None       Allergies: Allergies   Allergen Reactions    Percocet [Oxycodone-Acetaminophen] Headache     Severe headaches   (denies issues with Tylenol)    Povidone Iodine Rash     Unsure if betadine or gauze post surgical. Got rash on leg. Has  Had itchy rashes after every surgery prep and IV insertion    Medical Tape Hives    Chlorhexidine Rash     Per pt "able to use the liquid soap--thinkd reaction from the sponges or wipes"       Social History:     Marital Status: Single    Substance Use History:   Social History     Substance and Sexual Activity   Alcohol Use Not Currently    Comment: Recovering alcoholic     Social History     Tobacco Use   Smoking Status Former    Packs/day: 2.00    Years: 30.00    Total pack years: 60.00    Types: Cigarettes    Start date: 1985    Quit date: 2018    Years since quittin.8   Smokeless Tobacco Never   Tobacco Comments    Started at age 14.      Social History     Substance and Sexual Activity   Drug Use Yes    Types: Marijuana, Methamphetamines    Comment: 2 months off meth       Family History:    Family History   Problem Relation Age of Onset    Kidney cancer Mother     Diabetes Mother     Depression Mother     Stroke Mother     Arthritis Mother     Cancer Mother     Psychiatric Illness Mother     No Known Problems Father     No Known Problems Sister     No Known Problems Maternal Grandmother     No Known Problems Maternal Grandfather     No Known Problems Paternal Grandmother     No Known Problems Paternal Grandfather     Colon cancer Maternal Uncle     Colon cancer Maternal Uncle     Colon cancer Paternal Uncle     Colon cancer Family     Alcohol abuse Sister     Asthma Sister        Physical Exam:     Vitals:   Blood Pressure: 132/63 (11/11/23 0753)  Pulse: 65 (11/11/23 0753)  Temperature: (!) 97.2 °F (36.2 °C) (11/11/23 0753)  Temp Source: Temporal (11/11/23 0752)  Respirations: 17 (11/11/23 0753)  Height: 5' 6" (167.6 cm) (11/11/23 1027)  Weight - Scale: 135 kg (297 lb 1.6 oz) (11/11/23 0753)  SpO2: 96 % (11/11/23 0813)    Physical Exam  Vitals and nursing note reviewed. Constitutional:       General: She is not in acute distress. Appearance: Normal appearance. She is obese. She is not ill-appearing, toxic-appearing or diaphoretic. Comments: tearful   HENT:      Head: Normocephalic and atraumatic. Cardiovascular:      Rate and Rhythm: Normal rate and regular rhythm. Heart sounds: No murmur heard. No friction rub. No gallop. Pulmonary:      Effort: Pulmonary effort is normal. No respiratory distress. Breath sounds: Normal breath sounds. No wheezing, rhonchi or rales. Abdominal:      General: Abdomen is flat. Bowel sounds are normal. There is no distension. Palpations: Abdomen is soft. Tenderness: There is no abdominal tenderness. Musculoskeletal:      Right lower leg: No edema. Left lower leg: No edema. Skin:     General: Skin is warm and dry. Coloration: Skin is not jaundiced or pale. Neurological:      General: No focal deficit present. Mental Status: She is alert. Mental status is at baseline. Psychiatric:         Mood and Affect: Affect is tearful. Thought Content: Thought content includes suicidal ideation.          Additional Data:     Lab Results:     Results from last 7 days   Lab Units 11/11/23  0603   WBC Thousand/uL 8.87   HEMOGLOBIN g/dL 13.9   HEMATOCRIT % 43.4   PLATELETS Thousands/uL 150   NEUTROS PCT % 64   LYMPHS PCT % 24   MONOS PCT % 9   EOS PCT % 1     Results from last 7 days   Lab Units 11/11/23  0603   SODIUM mmol/L 140   POTASSIUM mmol/L 3.8 CHLORIDE mmol/L 107   CO2 mmol/L 24   BUN mg/dL 18   CREATININE mg/dL 0.61   ANION GAP mmol/L 9   CALCIUM mg/dL 9.0   ALBUMIN g/dL 3.6   TOTAL BILIRUBIN mg/dL 0.29   ALK PHOS U/L 48   ALT U/L 12   AST U/L 13   GLUCOSE RANDOM mg/dL 96     Results from last 7 days   Lab Units 11/11/23  0618   INR  2.03*         Lab Results   Component Value Date/Time    HGBA1C 5.0 01/05/2023 11:53 AM    HGBA1C 5.2 02/22/2022 08:28 AM    HGBA1C 4.9 12/18/2020 09:07 AM     Results from last 7 days   Lab Units 11/06/23  1817   POC GLUCOSE mg/dl 95           Imaging: I have personally reviewed pertinent reports. No orders to display       EKG, Pathology, and Other Studies Reviewed on Admission:   EKG: see above documentation    ** Please Note: This note has been constructed using a voice recognition system.  **

## 2023-11-11 NOTE — PLAN OF CARE
Problem: Ineffective Coping  Goal: Cooperates with admission process  Description: Interventions:   - Complete admission process  Outcome: Not Progressing  Goal: Identifies ineffective coping skills  Outcome: Not Progressing  Goal: Identifies healthy coping skills  Outcome: Not Progressing  Goal: Demonstrates healthy coping skills  Outcome: Not Progressing  Goal: Participates in unit activities  Description: Interventions:  - Provide therapeutic environment   - Provide required programming   - Redirect inappropriate behaviors   Outcome: Not Progressing  Goal: Patient/Family participate in treatment and DC plans  Description: Interventions:  - Provide therapeutic environment  Outcome: Not Progressing  Goal: Patient/Family verbalizes awareness of resources  Outcome: Not Progressing  Goal: Understands least restrictive measures  Description: Interventions:  - Utilize least restrictive behavior  Outcome: Not Progressing  Goal: Free from restraint events  Description: - Utilize least restrictive measures   - Provide behavioral interventions   - Redirect inappropriate behaviors   Outcome: Not Progressing     Problem: Risk for Self Injury/Neglect  Goal: Treatment Goal: Remain safe during length of stay, learn and adopt new coping skills, and be free of self-injurious ideation, impulses and acts at the time of discharge  Outcome: Not Progressing  Goal: Verbalize thoughts and feelings  Description: Interventions:  - Assess and re-assess patient's lethality and potential for self-injury  - Engage patient in 1:1 interactions, daily, for a minimum of 15 minutes  - Encourage patient to express feelings, fears, frustrations, hopes  - Establish rapport/trust with patient   Outcome: Not Progressing  Goal: Refrain from harming self  Description: Interventions:  - Monitor patient closely, per order  - Develop a trusting relationship  - Supervise medication ingestion, monitor effects and side effects   Outcome: Not Progressing  Goal: Attend and participate in unit activities, including therapeutic, recreational, and educational groups  Description: Interventions:  - Provide therapeutic and educational activities daily, encourage attendance and participation, and document same in the medical record  - Obtain collateral information, encourage visitation and family involvement in care   Outcome: Not Progressing  Goal: Recognize maladaptive responses and adopt new coping mechanisms  Outcome: Not Progressing  Goal: Complete daily ADLs, including personal hygiene independently, as able  Description: Interventions:  - Observe, teach, and assist patient with ADLS  - Monitor and promote a balance of rest/activity, with adequate nutrition and elimination  Outcome: Not Progressing     Problem: Depression  Goal: Treatment Goal: Demonstrate behavioral control of depressive symptoms, verbalize feelings of improved mood/affect, and adopt new coping skills prior to discharge  Outcome: Not Progressing  Goal: Verbalize thoughts and feelings  Description: Interventions:  - Assess and re-assess patient's level of risk   - Engage patient in 1:1 interactions, daily, for a minimum of 15 minutes   - Encourage patient to express feelings, fears, frustrations, hopes   Outcome: Not Progressing  Goal: Refrain from harming self  Description: Interventions:  - Monitor patient closely, per order   - Supervise medication ingestion, monitor effects and side effects   Outcome: Not Progressing  Goal: Refrain from isolation  Description: Interventions:  - Develop a trusting relationship   - Encourage socialization   Outcome: Not Progressing  Goal: Refrain from self-neglect  Outcome: Not Progressing  Goal: Attend and participate in unit activities, including therapeutic, recreational, and educational groups  Description: Interventions:  - Provide therapeutic and educational activities daily, encourage attendance and participation, and document same in the medical record   Outcome: Not Progressing  Goal: Complete daily ADLs, including personal hygiene independently, as able  Description: Interventions:  - Observe, teach, and assist patient with ADLS  -  Monitor and promote a balance of rest/activity, with adequate nutrition and elimination   Outcome: Not Progressing     Problem: Anxiety  Goal: Anxiety is at manageable level  Description: Interventions:  - Assess and monitor patient's anxiety level. - Monitor for signs and symptoms (heart palpitations, chest pain, shortness of breath, headaches, nausea, feeling jumpy, restlessness, irritable, apprehensive). - Collaborate with interdisciplinary team and initiate plan and interventions as ordered.   - [place] patient to unit/surroundings  - Explain treatment plan  - Encourage participation in care  - Encourage verbalization of concerns/fears  - Identify coping mechanisms  - Assist in developing anxiety-reducing skills  - Administer/offer alternative therapies  - Limit or eliminate stimulants  Outcome: Not Progressing

## 2023-11-11 NOTE — ASSESSMENT & PLAN NOTE
Patient is medically cleared for admission to [facility] and treatment of underlying psychiatric illness based on available results  No acute medical needs. Medicine will sign off.  Please call with additional questions or concerns

## 2023-11-11 NOTE — NURSING NOTE
Patient is medication and meal compliant. No complaints of pain or discomfort. Patient endorses depression and voiced concerns that she was not getting all her medications specifically her Strattera for ADHD. Patient denies anxiety or current SI, AH or VH. Patient was admitted for suicide attempt with flexeril after which she called her ACT team to get help. Patient reports she wants to stay on the medication that she was taking despite the current suicide attempt. Poor insight noted into her disease process and the use of psychiatric medications at home. Patient was visible on the unit and social with select peers. Will continue to monitor patient for changes in mood or behavior.

## 2023-11-11 NOTE — NURSING NOTE
Patient is a [facility] admit from [facility] due to intentional overdosed on 41 tablets of flexeril 10 mg. AN hour after the pills were ingested patient called her ACT team staff who then called the EMS and patient was transported to the ED. Hx of multiple SA, HTN, GERD, COPD, PE, bipolar affective d/o, anxiety, depression, Sjogren syndrome, Asthma, and visual impairment. Patient was cooperative with the admission process and endorses anxiety and depression, denies SI/HI, A/V/H and pain. Skin intact with excoriation under her left breast, groin area, bruising on b/l arm and +2 edema on b/l LE. Positive for THC. Q 7 minutes safety checks initiated.

## 2023-11-11 NOTE — ASSESSMENT & PLAN NOTE
Patient admitted to the [facility] on an involuntary 303 commitment basis secondary to depression and suicide attempt by overdose on 40 tablets of Flexeril  Initially seen at [address] [place] [address] for evaluation  Was recently hospitalized at [facility] from 9/29 - 10/6

## 2023-11-11 NOTE — ASSESSMENT & PLAN NOTE
With known history of hypertension  Blood pressure currently stable  Maintained on losartan 50 mg daily, metoprolol 25 mg twice daily, clonidine 0.1 mg twice daily, and amlodipine 5 mg daily  Hold amlodipine and metoprolol at this time given well controlled Bps without  Restart amlodipine and metoprolol if needed

## 2023-11-11 NOTE — H&P
Psychiatric Evaluation - Behavioral Health     Identification Data:Blanca Askew 47 y.o. female MRN: [id number]  Unit/Bed#: [facility] 793-57 Encounter: [id number]    Chief Complaint: suicide attempt by overdose    History of Present Illness     Hasmukh Weber is a 47 y.o. female with a history of Schizoaffective Disorder and personality disorder who was admitted to the inpatient older adult psychiatric unit on a involuntary 303 commitment basis due to depression and suicide attempt by overdose on 40 tablets of Flexaril . Melissa Espinoza overdosed on 11/06 and was admitted to [facility] for evaluation. She was most recently hospitalized in [facility] older adult [facility] from 09/29 to 10/06. She was seen by Dr. Brittany Silveira for consultation at [facility] and followed during her admission. Per his last note before she was discharged from [place] medical floor: "I came to see the patient for continuity of care. Per staff patient attempted to elope again yesterday. Patient initially was not verbally answering questions, her eyes were downcast.  She reports being upset regarding being hospitalized. She was tearful during the evaluation. She verbalized apathetic statements such as "fuck it". She was perseverative regarding returning home and did not appear to acknowledge the seriousness of her overdose. She reports adequate sleep and appetite. She denies current suicidal or homicidal ideation, plan, or intent. When assessing for hallucinations patient stated that "the other night" she felt that she was "talking a lot when falling asleep". She denies auditory or visual hallucinations. As interview progressed patient became somewhat more engaged and cooperative. When asked about her home Depakote regimen patient states that she does not remember her dosages. States that she was taking "1 to 2" tablets of Depakote at night and does not remember how much she was taking in the morning.   Reports that she had been experiencing tremors due to the Depakote and states that the plan by her psychiatrist was to taper off this medication and onto a different mood stabilizer. She reports having experienced tremors in her hands and legs and states that she has been experiencing those tremors today. States that she does not want to take Depakote anymore. She reports feeling depressed. She reports that she is interested in medication change to improve her condition."   Addendum at 1:04 pm 11/10: "Received [employer] message from primary team stating that patient tried to hurt herself and was banging her arms on the wall, control team was called and patient was given Ativan prn. We discussed need for improved mood stabilization and treatment of agitation. Due to this recent episode of agitation, impulsivity, and unstable mood discussed recommendation to start oxcarbazepine 150 mg BID for mood stabilization and to order prn medications for agitation."  Patient was transferred to this unit on latuda 60mg daily, strattera 40mg daily, wellbutrin Xl 300mg daily, topamax 200mg BID and trileptal 150mg BID. On evaluation in the inpatient psychiatric unit Alyse Gordillo states she is feeling "okay" and is hoping for discharge soon. States that she regrets her suicide attempt. States that she does not feel as depressed, and admits that she had been feeling depressed and more anhedonic before her suicide attempt. Admits that "it was a mistake ". States she has been feeling more depressed over the past 2 years after her mother . She states that her mother was on hospice, and she was providing support for her before she passed. States that she has been hospitalized several times since then, most recently about a month ago. Admits that she does want to get better, is hopeful that this medication change will help. States that she is already noted that she is coloring more and feels more motivated.   States that she wants to go back to work, and wants to go home and see her pet cat. States that she also has to be there for her father. She denies any side effects from her medication changed to Trileptal, and is hopeful this will help reduce her tremors. She states she would like to restart her Strattera, she states that this is helping her following her relapse on methamphetamines. 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 She denies current thoughts to herself or anyone else, denies any hallucinations. She states that she does want to see how she responds to treatment, and is going to continue coloring here on the unit to help relax.         Psychiatric Review Of Systems:    Sleep changes: no change  Appetite changes:decreased  Weight changes: no  Energy: yes, decreased before admission  Interest/pleasure/: decreased, but improving somewhat now  Anhedonia: yes  Anxiety: worrying daily  Pooja: past manic episodes, mood swings, lasting several days in a row  Guilt:  yes  Hopeless:  no  Self injurious behavior/risky behavior: yes, was cutting self on the hospital unit  Suicidal ideation: status post suicide attempt by overdose on medications  Homicidal ideation: no  Auditory hallucinations: no  Visual hallucinations: no  Delusional thinking: no  Eating disorder history: no  Obsessive/compulsive symptoms: no    Historical Information     Past Psychiatric History:     Past Inpatient Psychiatric Treatment:   Multiple past inpatient psychiatric admissions  Most recently in [facility] [place] 9/28-10/06/2023  Past Outpatient Psychiatric Treatment:    Currently in outpatient psychiatric treatment with a psychiatrist, works with [facility] ACT team  Past Suicide Attempts: yes, yes, by overdose on medications  Past Violent Behavior:none  Past Psychiatric Medication Trials: multiple psychiatric medication trials     Substance Abuse History:    Social History       Tobacco History       Smoking Status  Former Smoking Start Date  1/1/1985 Quit Date  1/2/2018 Smoking Frequency  2.00 packs/day for 30.00 years (60.00 ttl pk-yrs)    Smoking Tobacco Type  Cigarettes from 1/1/1985 to 1/2/2018      Smokeless Tobacco Use  Never      Tobacco Comments  Started at age 14. Alcohol History       Alcohol Use Status  Not Currently Drinks/Week  0 Glasses of wine, 0 Cans of beer, 0 Shots of liquor, 0 Standard drinks or equivalent per week Comment  Recovering alcoholic              Drug Use       Drug Use Status  Yes Types  Marijuana, Methamphetamines Comment  2 months off meth              Sexual Activity       Sexually Active  Not Currently Partners  Male Comment  defer              Activities of Daily Living    Not Asked                 Additional Substance Use Detail       Questions Responses    Problems Due to Past Use of Alcohol? No    Problems Due to Past Use of Substances?  No    Substance Use Assessment Substance use within the past 12 months    Alcohol Use Frequency Denies use in past 12 months    Cannabis frequency Daily    Comment: 3 or more times/week on 9/12/2018 3 or more times/week -> Daily on 7/21/2022     Heroin Frequency Denies use in past 12 months    Alcohol Drink of Choice wine coolers    Cannabis method Smoke    Comment: Smoke on 9/12/2018     1st Use of Alcohol 16years old    Cannabis 3t Use 16years old    Last Use of Alcohol & Amount 3 years ago 1 wine cooler    Cannabis last use 7/13/22    Comment: Has a medical marijuana card     Longest Abstinence from Alcohol 3 years    Cannabis Longest Abstinence unclear    Cocaine frequency Past regular use    Comment: Never used on 12/22/2021 Never used -> 3 or more times/week on 7/21/2022 3 or more times/week -> Past regular use on 11/10/2023     Crack Cocaine Frequency Denies use in past 12 months    Methamphetamine Frequency Experimented    Methamphetamine Method Snort    Cocaine First Use 16years old    Methamphetamine 3t Use 37years old    Cocaine last use     Comment: quit in her 30's Methamphetamine Last Use & Amount 23    Cocaine Longest Abstinence Over 10-20 years    Methamphetamine Longest Abstinence unclear    Narcotic Frequency Denies use in past 12 months    Benzodiazepine Frequency Denies use in past 12 months    Amphetamine frequency Denies use in past 12 months    Barbituate Frequency Denies use use in past 12 months    Inhalant frequency Never used    Comment: Never used on 2021     Hallucinogen frequency Never used    Comment: Never used on 2021     Ecstasy frequency Never used    Comment: Never used on 2021     Other drug frequency Never used    Comment: Never used on 2021     Opiate frequency Denies use in past 12 months    Not reviewed. I have assessed this patient for substance use within the past 12 months    Alcohol use: denies use, denies current use  Recreational drug use: uses medical marijuana, has history of methamphetamine abuse, sober since early 2023. Family Psychiatric History: family hx of depression in her mother.      Social History:    Education: high school diploma/GED  Marital History: single  Children: none  Living Arrangement: lives alone in an apartment  Occupational History: works for [employer]  Functioning Relationships: good support system  Legal History: none   History: None    Traumatic History: mother's death traumatic,  on hospice care in       Past Medical History:    History of seizure or head trauma    Past Medical History:   Diagnosis Date    Anxiety     Arthritis     oa cassandra knees    Asthma     good control- no medications    Yan's esophagus     Bipolar affective disorder ([address])     Cannabis abuse with unspecified 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HCC)     Chronic pain disorder     lumbar    COPD (chronic obstructive pulmonary disease) (HCC)     CPAP (continuous positive airway pressure) dependence     Degenerative disc disease at L5-S1 level     Deliberate self-cutting     9/15/22per pt has not done recently-- does see a therapist and psychiatrist regularly    Depression 09/16/2008    Disease of thyroid gland     hypo    MARTINEZ (dyspnea on exertion)     per pt "has had this with exertion and not new"    Drug overdose 10/28/2008    suicide attempt and again drug overdose 7/2022-- [facility] floor and than transferred to [facility]    Dysphagia     Dyspnea     Edema     BLE    Family history of blood clots     GERD (gastroesophageal reflux disease)     Headache(784.0)     Headache, tension-type     Hemorrhage of rectum and anus 10/02/2017    Formatting of this note might be different from the original.  Added automatically from request for surgery [id number]    High blood pressure     History of COVID-19 12/30/2020    Symptoms started on 12/30/2020 and then got worse. Today she is feeling a little bit better. She is a candidate for monoclonal antibody infusion and set for 1/6/21 @ 1pm at [facility]. 01/07/21 - telemedicine -     Hyperlipidemia     Hypertension     Knee pain, bilateral     Especially right    Medical marijuana use     Memory loss     Migraines     Obesity     Overactive bladder     Pulmonary emboli (HCC)     Sjogren's disease ([address])     Sleep apnea     Stress incontinence     Suicidal ideations     Teeth missing     Tremor     per pt Tremors of Right Leg and both Arms    Visual impairment     Wears glasses      Past Surgical History:   Procedure Laterality Date    BREAST CYST EXCISION Right 1989    CARPAL TUNNEL RELEASE Left     CHOLECYSTECTOMY  05/2003    Laparoscopic    COLONOSCOPY      01/12/2009    DILATION AND CURETTAGE OF UTERUS      ELBOW SURGERY Left     x2.  No hardware    ESOPHAGOGASTRODUODENOSCOPY      FOOT SURGERY Left     Plantar fasciotomy    HERNIA REPAIR      HYSTERECTOMY      laporoscopic, partial    KNEE ARTHROSCOPY Bilateral     OOPHORECTOMY Left 10/2015    TN GASTROCNEMIUS RECESSION Left 02/24/2021    Procedure: RECESSION GASTROC OPEN;  Surgeon: Duke Miller DPM; Location:  MAIN OR;  Service: Podiatry    WI RPR UMBILICAL HRNA 5 YRS/> REDUCIBLE N/A 04/24/2019    Procedure: REPAIR HERNIA UMBILICAL LAPAROSCOPIC W/ ROBOTICS;  Surgeon: Humphrey Rice MD;  Location: [facility] Main OR;  Service: General    WI SPHINCTEROTOMY ANAL DIVISION SPHINCTER SPX N/A 08/31/2018    Procedure: EUA, LEFT PARTIAL INTERNAL SPHINCTEROTOMY;  Surgeon: Gabriela Garcia MD;  [facility] MAIN OR;  Service: Colorectal    WI TNOT ELBOW LATERAL/MEDIAL DEBRIDE OPEN TDN RPR Left 09/20/2022    Procedure: RELEASE EPICONDYLAR ELBOW MEDIAL;  Surgeon: James Villafuerte MD;  [facility];  Service: Orthopedics    REDUCTION MAMMAPLASTY Bilateral 1999    REPAIR LACERATION Left     left hand-5/18/2009    REPLACEMENT TOTAL KNEE Right     ROTATOR CUFF REPAIR Left     TONSILECTOMY AND ADNOIDECTOMY      [address] [facility] PROCEDURE  04/22/2021    VASCULAR SURGERY      VEIN LIGATION Bilateral     WISDOM TOOTH EXTRACTION         Medical Review Of Systems:    Pertinent items are noted in HPI. Allergies: Allergies   Allergen Reactions    Percocet [Oxycodone-Acetaminophen] Headache     Severe headaches   (denies issues with Tylenol)    Povidone Iodine Rash     Unsure if betadine or gauze post surgical. Got rash on leg. Has  Had itchy rashes after every surgery prep and IV insertion    Medical Tape Hives    Chlorhexidine Rash     Per pt "able to use the liquid soap--thinkd reaction from the sponges or wipes"       Medications: All current active medications have been reviewed.     OBJECTIVE:    Vital signs in last 24 hours:    Temp:  [97.2 °F (36.2 °C)-97.8 °F (36.6 °C)] 97.2 °F (36.2 °C)  HR:  [65-81] 81  Resp:  [17-18] 17  BP: (122-132)/(58-74) 131/74      Intake/Output Summary (Last 24 hours) at 11/11/2023 1305  Last data filed at 11/11/2023 1208  Gross per 24 hour   Intake 840 ml   Output --   Net 840 ml        Mental Status Evaluation:    Appearance:  dressed appropriately, improved grooming, overweight, tattooed   Behavior:  guarded, less agitated, more appropriate, fair eye contact   Speech:  normal rate and volume   Mood:  less anxious, still somewhat dysphoric   Affect:  constricted, tearful   Language: naming objects and repeating phrases   Thought Process:  linear, normal rate of thoughts   Associations: intact associations   Thought Content:  no overt delusions   Perceptual Disturbances: no auditory hallucinations, no visual hallucinations   Risk Potential: Suicidal ideation - None at present, remorseful about suicide attempt, contracts for safety on the unit, would talk to staff if not feeling safe on the unit, No active suicidal ideation  Homicidal ideation - None  Potential for aggression - No   Sensorium:  oriented to person, place, and time/date   Memory:  recent and remote memory grossly intact   Consciousness:  alert and awake   Attention: attention span and concentration are age appropriate   Intellect: within normal limits   Fund of Knowledge: awareness of current events: yes   Insight:  improving   Judgment: fair and improving   Muscle Strength Muscle Tone: normal  normal   Gait/Station: normal gait/station, normal balance   Motor Activity: no abnormal movements       Laboratory Results: I have personally reviewed all pertinent laboratory/tests results. Imaging Studies: VAS lower limb venous duplex study, unilateral/limited    Result Date: 10/29/2023  Narrative:  [facility] REPORT CLINICAL: Indications: Patient presents with left lower extremity edema x 2 days. Operative History: Patient denies any cardiovascular procedures Risk Factors The patient has history of HTN, DVT and previous smoking (quit 1-5yrs ago). She has no history of Diabetes. CONCLUSION: Impression: RIGHT LOWER LIMB LIMITED: Evaluation shows no evidence of thrombus in the common femoral vein. Doppler evaluation shows a normal response to augmentation maneuvers.   LEFT LOWER LIMB: No evidence of acute deep vein thrombosis. Difficult to rule out chronic findings in the calf vessels. No evidence of superficial thrombophlebitis noted. Doppler evaluation shows a normal response to augmentation maneuvers. Popliteal, posterior tibial and anterior tibial arterial Doppler waveform's are triphasic/hyperemic. Technically difficult/limited study. Some segments may be poorly visualized on today's exam.  Technical findings were given to Army Safia HORN via [email or web address]. SIGNATURE: Electronically Signed by: Jet Potts MD on 2023-10-29 07:50:21 PM    XR chest 1 view portable    Result Date: 10/16/2023  Narrative: CHEST INDICATION:   Lower extremity edema. COMPARISON: 9/26/2023 EXAM PERFORMED/VIEWS:  XR CHEST PORTABLE FINDINGS: Cardiomediastinal silhouette appears unremarkable. The lungs are clear. No pneumothorax or pleural effusion. Osseous structures appear within normal limits for patient age. Impression: No acute findings. Workstation performed: [id number]       Code Status: Level 1 - Full Code  Advance Directive and Living Will: <no information>    Assessment/Plan   Principal Problem:    Schizoaffective disorder, bipolar type ([address])  Active Problems:    Benign essential hypertension    Edema    Hypothyroidism    Overactive bladder    Sjogren's syndrome (HCC)    COPD (chronic obstructive pulmonary disease) ([address])    Medical clearance for psychiatric admission    History of pulmonary embolism    Grief      Patient Strengths: ability for insight, compliant with medication, general fund of knowledge, motivation for treatment/growth, reasoning ability, stable housing     Patient Barriers: low self esteem, grief    Treatment Plan:   Continue with current medications including  Latuda 60mg daily for mood stabilization  Wellbutrin XL 300mg daily for depression  Strattera 40mg daily for depression  Topamax 200mg BID for mood stabilization  Trileptal 150mg BID for mood stabilization. Could increase further, but just started 11/10. Continue d/c of depakote. Planned Treatment and Medication Changes: All current active medications have been reviewed  Encourage group therapy, milieu therapy and occupational therapy  Behavioral Health checks every 7 minutes  Appreciate ACT team involvement and collateral should be obtained with them      Risks / Benefits of Treatment:    Risks, benefits, and possible side effects of medications explained to patient and patient verbalizes understanding and agreement for treatment. Counseling / Coordination of Care:    Patient's presentation on admission and proposed treatment plan discussed with treatment team.  Diagnosis, medication changes and treatment plan reviewed with patient. Inpatient Psychiatric Certification:    Estimated length of stay: 7 midnights  CERTIFICATION of Patient Admission. Required At Time Of Admission    I certify that services are required to be given on an inpatient basis because of the above named patient's need for psychiatric care on a continuous basis for the condition(s) for which she is receiving; Active treatment which could reasonably be expect to improve the patient's condition.   Diagnostic Studies    [facility], DO  11/11/23      [address] [place] [address], DO 11/11/23

## 2023-11-11 NOTE — NURSING NOTE
Patient complained of peers triggering her anxiety. Patient requested medication that she reports she had at the last hospital (Atarax). PRN Atarax given at [phone] with effectiveness noted and no further complaints of anxiety from the patient. Will continue to monitor patient status.

## 2023-11-11 NOTE — TREATMENT PLAN
TREATMENT PLAN REVIEW - [address] [place] [address] 47 y.o. 1970 female MRN: [id number]    [address] [facility] Encounter: [id number]          Admit Date/Time:  11/10/2023  8:07 PM    Treatment Team: Attending Provider: Ella Swift MD; Registered Nurse: Richard Nazario RN; Patient Care Assistant: Sunny Castillo; Registered Nurse: Filemon Flynn RN; Physician Assistant: Arianna Armenta PA-C; Patient Care Assistant: Shanda Quarles;  Patient Care Assistant: Ling Nair    Diagnosis: Principal Problem:    Schizoaffective disorder, bipolar type ([address])  Active Problems:    Benign essential hypertension    Edema    Hypothyroidism    Overactive bladder    Sjogren's syndrome ([facility])    COPD (chronic obstructive pulmonary disease) ([facility])    Medical clearance for psychiatric admission    History of pulmonary embolism    Grief      Patient Strengths/Assets: communication skills, financial means, interpersonal skills, motivated, negotiates basic needs, patient is willing to work on problems    Patient Barriers/Limitations: difficulty adapting    Short Term Goals: decrease in depressive symptoms, decrease in suicidal thoughts, ability to stay safe on the unit, improvement in self care, mood stabilization    Long Term Goals: stabilization of mood, free of suicidal thoughts, adequate self care, adequate sleep, adequate appetite    Progress Towards Goals: starting psychiatric medications as prescribed, insight is slowly improving    Recommended Treatment: medication management, patient medication education, group therapy, milieu therapy, continued Behavioral Health psychiatric evaluation/assessment process    Treatment Frequency: daily medication monitoring, group and milieu therapy daily, monitoring through interdisciplinary rounds, monitoring through weekly patient care conferences    Expected Discharge Date:  11/17/2023    Discharge Plan: referrals as indicated, return to previous ACT team    Treatment Plan Created/Updated By: Matthew Pendleton DO

## 2023-11-12 LAB
INR PPP: 1.8 (ref 0.84–1.19)
PROTHROMBIN TIME: 21.2 SECONDS (ref 11.6–14.5)

## 2023-11-12 PROCEDURE — 85610 PROTHROMBIN TIME: CPT | Performed by: PHYSICIAN ASSISTANT

## 2023-11-12 PROCEDURE — 99232 SBSQ HOSP IP/OBS MODERATE 35: CPT | Performed by: STUDENT IN AN ORGANIZED HEALTH CARE EDUCATION/TRAINING PROGRAM

## 2023-11-12 RX ORDER — WARFARIN SODIUM 5 MG/1
5 TABLET ORAL
Status: DISCONTINUED | OUTPATIENT
Start: 2023-11-12 | End: 2023-11-14

## 2023-11-12 RX ADMIN — TOPIRAMATE 200 MG: 100 TABLET, FILM COATED ORAL at 17:07

## 2023-11-12 RX ADMIN — TOPIRAMATE 200 MG: 100 TABLET, FILM COATED ORAL at 08:38

## 2023-11-12 RX ADMIN — CLONIDINE HYDROCHLORIDE 0.1 MG: 0.1 TABLET ORAL at 08:38

## 2023-11-12 RX ADMIN — LEVOTHYROXINE SODIUM 125 MCG: 125 TABLET ORAL at 05:35

## 2023-11-12 RX ADMIN — NALTREXONE HYDROCHLORIDE 50 MG: 50 TABLET, FILM COATED ORAL at 08:37

## 2023-11-12 RX ADMIN — LURASIDONE HYDROCHLORIDE 60 MG: 40 TABLET, COATED ORAL at 08:37

## 2023-11-12 RX ADMIN — OXCARBAZEPINE 150 MG: 150 TABLET, FILM COATED ORAL at 08:37

## 2023-11-12 RX ADMIN — WARFARIN SODIUM 5 MG: 5 TABLET ORAL at 17:07

## 2023-11-12 RX ADMIN — CHOLECALCIFEROL TAB 25 MCG (1000 UNIT) 1000 UNITS: 25 TAB at 08:37

## 2023-11-12 RX ADMIN — ATOMOXETINE 40 MG: 40 CAPSULE ORAL at 08:36

## 2023-11-12 RX ADMIN — BUPROPION HYDROCHLORIDE 300 MG: 300 TABLET, EXTENDED RELEASE ORAL at 08:37

## 2023-11-12 RX ADMIN — OXCARBAZEPINE 150 MG: 150 TABLET, FILM COATED ORAL at 21:15

## 2023-11-12 RX ADMIN — NYSTATIN 1 APPLICATION: 100000 POWDER TOPICAL at 21:29

## 2023-11-12 RX ADMIN — FUROSEMIDE 20 MG: 20 TABLET ORAL at 08:38

## 2023-11-12 RX ADMIN — LOSARTAN POTASSIUM 50 MG: 50 TABLET, FILM COATED ORAL at 08:37

## 2023-11-12 RX ADMIN — CLONIDINE HYDROCHLORIDE 0.1 MG: 0.1 TABLET ORAL at 21:15

## 2023-11-12 RX ADMIN — NYSTATIN: 100000 POWDER TOPICAL at 08:38
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
## 2023-11-12 NOTE — NURSING NOTE
Patient is visible on the unit and social with select peers. Patient endorses depression, denies SI/HI, A/VH, and anxiety. Patient is compliant with all medications and cooperative with care. Patient asked this writer to assist with finding grief support groups. This writer left a note on SBAR for the treatment team. Appetite is good.
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
## 2023-11-12 NOTE — QUICK NOTE
INR subtherapeutic today at 1.8. Goal is 2.0 -3.0. Patient appears to be either slightly below or slightly above 2.0 on warfarin 4mg daily. Per chart review, she was taking warfarin 6mg daily prior to admission. Will increase warfarin to 5mg daily for now. Recheck INR 11/14.

## 2023-11-12 NOTE — CASE MANAGEMENT
Case Management Progress Note    Patient name Leticia Pope  Location [facility] 660/[facility] 212-29 MRN [id number]  : 1970 Date 2023       LOS (days): 2  Geometric Mean LOS (GMLOS) (days):   Days to [address]:        OBJECTIVE:        Current admission status: Inpatient Psych  Preferred Pharmacy:   [id number], [address] [facility], [address]  Phone: [phone] Fax: [phone]    Primary Care Provider: JHONATAN Juan    Primary Insurance: MEDICARE  Secondary Insurance:  [address] [place]    PROGRESS NOTE:    CM received notification from [facility] that patient has a 303 hearing scheduled over ZOOM for  at 8:15am.     Cm notified via email to Dr. Yee Nj, accepting physician at treatment center, and UR teams about 303 hearing time and ZOOM link phone numbers.

## 2023-11-13 LAB
BACTERIA UR QL AUTO: ABNORMAL /HPF
BILIRUB UR QL STRIP: NEGATIVE
CLARITY UR: CLEAR
COLOR UR: ABNORMAL
GLUCOSE UR STRIP-MCNC: NEGATIVE MG/DL
HGB UR QL STRIP.AUTO: 10
KETONES UR STRIP-MCNC: NEGATIVE MG/DL
LEUKOCYTE ESTERASE UR QL STRIP: 25
MUCOUS THREADS UR QL AUTO: ABNORMAL
NITRITE UR QL STRIP: NEGATIVE
NON-SQ EPI CELLS URNS QL MICRO: ABNORMAL /HPF
PH UR STRIP.AUTO: 5 [PH]
PROT UR STRIP-MCNC: ABNORMAL MG/DL
RBC #/AREA URNS AUTO: ABNORMAL /HPF
SP GR UR STRIP.AUTO: 1.02 (ref 1–1.04)
UROBILINOGEN UA: NEGATIVE MG/DL
WBC #/AREA URNS AUTO: ABNORMAL /HPF

## 2023-11-13 PROCEDURE — 87086 URINE CULTURE/COLONY COUNT: CPT | Performed by: NURSE PRACTITIONER

## 2023-11-13 PROCEDURE — 81001 URINALYSIS AUTO W/SCOPE: CPT | Performed by: NURSE PRACTITIONER

## 2023-11-13 PROCEDURE — 87147 CULTURE TYPE IMMUNOLOGIC: CPT | Performed by: NURSE PRACTITIONER

## 2023-11-13 PROCEDURE — 81003 URINALYSIS AUTO W/O SCOPE: CPT | Performed by: NURSE PRACTITIONER

## 2023-11-13 PROCEDURE — 99232 SBSQ HOSP IP/OBS MODERATE 35: CPT | Performed by: STUDENT IN AN ORGANIZED HEALTH CARE EDUCATION/TRAINING PROGRAM

## 2023-11-13 RX ORDER — HALOPERIDOL DECANOATE 50 MG/ML
50 INJECTION INTRAMUSCULAR
Status: DISCONTINUED | OUTPATIENT
Start: 2023-11-22 | End: 2023-11-17 | Stop reason: HOSPADM

## 2023-11-13 RX ORDER — CEFPODOXIME PROXETIL 200 MG/1
400 TABLET, FILM COATED ORAL 2 TIMES DAILY WITH MEALS
Status: DISCONTINUED | OUTPATIENT
Start: 2023-11-13 | End: 2023-11-17 | Stop reason: HOSPADM

## 2023-11-13 RX ORDER — OXCARBAZEPINE 300 MG/1
300 TABLET, FILM COATED ORAL EVERY 12 HOURS SCHEDULED
Status: DISCONTINUED | OUTPATIENT
Start: 2023-11-13 | End: 2023-11-17 | Stop reason: HOSPADM

## 2023-11-13 RX ADMIN — FUROSEMIDE 20 MG: 20 TABLET ORAL at 09:02

## 2023-11-13 RX ADMIN — NYSTATIN: 100000 POWDER TOPICAL at 09:04

## 2023-11-13 RX ADMIN — TOPIRAMATE 200 MG: 100 TABLET, FILM COATED ORAL at 09:02

## 2023-11-13 RX ADMIN — WARFARIN SODIUM 5 MG: 5 TABLET ORAL at 17:05

## 2023-11-13 RX ADMIN — CEFPODOXIME PROXETIL 400 MG: 200 TABLET, FILM COATED ORAL at 17:06

## 2023-11-13 RX ADMIN — NYSTATIN: 100000 POWDER TOPICAL at 21:21

## 2023-11-13 RX ADMIN — CLONIDINE HYDROCHLORIDE 0.1 MG: 0.1 TABLET ORAL at 21:20

## 2023-11-13 RX ADMIN — CLONIDINE HYDROCHLORIDE 0.1 MG: 0.1 TABLET ORAL at 09:03

## 2023-11-13 RX ADMIN — TRAZODONE HYDROCHLORIDE 50 MG: 50 TABLET ORAL at 21:20

## 2023-11-13 RX ADMIN — ATOMOXETINE 40 MG: 40 CAPSULE ORAL at 09:02

## 2023-11-13 RX ADMIN — TOPIRAMATE 200 MG: 100 TABLET, FILM COATED ORAL at 17:05

## 2023-11-13 RX ADMIN — BUPROPION HYDROCHLORIDE 300 MG: 300 TABLET, EXTENDED RELEASE ORAL at 09:02

## 2023-11-13 RX ADMIN — HYDROXYZINE HYDROCHLORIDE 50 MG: 50 TABLET, FILM COATED ORAL at 21:20

## 2023-11-13 RX ADMIN — LURASIDONE HYDROCHLORIDE 60 MG: 40 TABLET, COATED ORAL at 09:02

## 2023-11-13 RX ADMIN — LEVOTHYROXINE SODIUM 125 MCG: 125 TABLET ORAL at 05:30

## 2023-11-13 RX ADMIN — NALTREXONE HYDROCHLORIDE 50 MG: 50 TABLET, FILM COATED ORAL at 09:02

## 2023-11-13 RX ADMIN — OXCARBAZEPINE 300 MG: 300 TABLET, FILM COATED ORAL at 21:20

## 2023-11-13 RX ADMIN — OXCARBAZEPINE 150 MG: 150 TABLET, FILM COATED ORAL at 09:02

## 2023-11-13 RX ADMIN — CHOLECALCIFEROL TAB 25 MCG (1000 UNIT) 1000 UNITS: 25 TAB at 09:03

## 2023-11-13 RX ADMIN — LOSARTAN POTASSIUM 50 MG: 50 TABLET, FILM COATED ORAL at 09:02

## 2023-11-13 NOTE — NURSING NOTE
Patient was visible in the milieu with minimal peer interaction. Denies all psych s/s. No complaints offered. No behaviors noted. Calm and cooperative with care. Uses CPAP at night. Took her HS medications. Safety checks ongoing.

## 2023-11-13 NOTE — TREATMENT TEAM
Pt attended all groups. Pt able to self reflect and cooperative. Pt did note that her father provided support and that has been refreshing. Pt noted he provided positive feedback when she advised him of hospitalization and stated she hopes to have medication adjustment and d/c at end of week (not confirmed with doctor). Pt indicated she would like to volunteer and get involved in community/ attend [employer] with friend. Pt aware of Crisis, DELIA, warmline and 988 numbers that were discussed in group.      11/13/23 1330   Activity/Group Checklist   Group Other (Comment)  (Community support and info)   Attendance Attended   Attendance Duration (min) 46-60   Interactions Interacted appropriately   Affect/Mood Appropriate   Goals Achieved Identified feelings; Identified relapse prevention strategies; Able to listen to others; Able to engage in interactions; Able to reflect/comment on own behavior;Verbalized increased hopefulness; Able to self-disclose; Able to manage/cope with feelings; Identified resources and support systems; Able to recieve feedback; Discussed self-esteem issues; Discussed coping strategies

## 2023-11-13 NOTE — PROGRESS NOTES
11/13/23 1154   Team Meeting   Meeting Type Tx Team Meeting   Initial Conference Date 11/13/23   Next Conference Date 12/11/23   Team Members Present   Team Members Present Physician;Nurse;   Physician Team Member Dr Clarisse Griffin Team Member [facility] Management Team Member [facility] TRANSITIONAL CARE & REHABILITATION   Patient/Family Present   Patient Present Yes   Patient's Family Present No     Met with the patient to go over their treatment plan. Goals discussed were to have a decrease in depressive symptoms, decrease in suicidal thoughts, ability to stay safe on the unit, improvement in self care, mood stabilization. Goals can be attained through medication management and group/milieu therapy. Patient agrees with their treatment plan.

## 2023-11-13 NOTE — QUICK NOTE
Notified by nursing regarding patient's urinary frequency and urgency. Prior UA within normal limits. Will recheck a UA.

## 2023-11-13 NOTE — CASE MANAGEMENT
Called [facility] at [phone] and left a voicemail to find out when the last Haldol Decanoate was given to the patient and what the dose was.  Awaiting callback

## 2023-11-13 NOTE — CASE MANAGEMENT
CM received a fax from  [facility] that reported the patient last received her injection of Haldol Dec 50mg on 10/25/23 and that her next injection is due 11/22/23. Provider made aware.

## 2023-11-13 NOTE — TREATMENT TEAM
Pt completed admission self assessment. Pt known form previous hospitalization. Pt note biggest stressor upon admission as "bored, depressed". Pt likes most about her life "my cat, my family, my job" and least about her life "being alone a lot low self esteem". Pt indicated she completed high school and employed. Pt stated she likes "music, arts and crafts. Pt would like groups on self esteem, anger management, , assertiveness an coping with symptoms of my illness. Pt left blank at d/c she delma be able to. Encouraged pt to attend groups and make needs known. Pt attends groups. 11/13/23 1100   Activity/Group Checklist   Group Admission/Discharge   Attendance Attended   Attendance Duration (min) 16-30   Interactions Interacted appropriately   Affect/Mood Appropriate   Goals Achieved Identified feelings; Identified relapse prevention strategies; Discussed coping strategies; Able to listen to others; Able to engage in interactions; Able to reflect/comment on own behavior;Able to manage/cope with feelings;Verbalized increased hopefulness; Able to self-disclose; Able to recieve feedback

## 2023-11-13 NOTE — NURSING NOTE
Patient is visible on the unit and social with peers. Patient had 303 hearing this AM and it was upheld; patient was tearful. Patient endorses anxiety and depression, denies SI/HI, A/VH. Patient is compliant with all medications and cooperative with care. Appetite is good.

## 2023-11-13 NOTE — CMS CERTIFICATION NOTE
Certification: Based upon physical, mental and social evaluations, I certify that inpatient psychiatric services are medically necessary for this patient for a duration of 21 midnights for the treatment of Schizoaffective disorder, bipolar type [facility])  Available alternative community resources do not meet the patient's mental health care needs. I further attest that an established written individualized plan of care has been implemented and is outlined in the patient's medical records.

## 2023-11-13 NOTE — PROGRESS NOTES
11/13/23 0826   Team Meeting   Meeting Type Daily Rounds   Initial Conference Date 11/13/23   Next Conference Date 11/14/23   Team Members Present   Team Members Present Physician;Nurse;;; Occupational Therapist   Physician Team Member Dr Radha Devi, Dr Cynthia De Paz, Dr Nora Brower Team Member [facility] Management Team Member [employer] Team Member Margarito Baker   OT Team Member Bridgett Tejada   Patient/Family Present   Patient Present No   Patient's Family Present No     Add Caroline - pharmacy: 303 hearing this morning, here for an OD on flexeril (took 41 pills), has an [facility] team, depressed and tearful, check on haldol dec with the ACT team

## 2023-11-13 NOTE — PROGRESS NOTES
Progress Note - Marie Mason 47 y.o. female MRN: [id number]  Unit/Bed#: Eduarda Rowley 847-29 Encounter: [id number]       Patient was visited on unit for continuing care; chart reviewed and discussed with multidisciplinary treatment team. On approach, the patient was calm and cooperative. The patient was remorseful about her suicide attempt reportedly overdosed on 41 pills and noted that she was very "bored" as she was out of work for a while and started working 3 days a week but sometimes not having enough things to do and have been staying at work to be around people and not feeling bored. She noted that her outpatient psychiatrist reportedly has been tapering down her Depakote due to "tremors" and she has been feeling better since the psych consult team stopped the Depakote and restarted her on Trileptal.  She mentioned her cat and her father as well as her access to therapy via ACT team and going to dual diagnosis services as her main support system, and stated that she has been clean for more than 2 months and denied any Methamphetamine or other illicit substance abuse since last discharge. She reported using Medical Marijuana $50 / week) for pain and sleep. She reported feeling better since being in the hospital. Denied any changes in appetite, and energy level. Reported interrupted sleep last night. Denied A/VH currently. Denied SI/HI, intent or plan upon direct inquiry at this time. Patient continues to be visible in the milieu and interacts with staff and peers. No reports of aggression or self-injurious behavior on unit. No PRN medications used in the past 24 hours. Patient accepted all offered medications and reported feeling better. No adverse effects of medications noted or reported. The patient reportedly received Haldol Dec 50 mg IM (monthly dose) on 10/25/23 (next dose due on 11/22/23).  She was last discharged on Latuda 40 mg BID which restarted as 40 mg daily and then increased to 60 mg daily by the consult team, and also Depakote was discontinued and started on Trileptal 150 mg BID which is being uptitrated to 300 mg BID. Other meds maintained the same dose. Doses to be adjusted as indicated.      Current Mental Status Evaluation:  Appearance and attitude: appeared as stated age, cooperative and attentive, casually dressed, tattooed, wearing eyeglasses, with good hygiene  Eye contact: good  Motor Function: within normal limits, intact gait, No PMA/PMR  Gait/station: normal gait/station and normal balance  Speech: normal for rate, rhythm, volume, latency, amount  Language: No overt abnormality  Mood/affect: depressed / Affect was constricted but reactive, mood congruent  Thought Processes: sequential and goal-directed  Thought content: denied suicidal ideations or homicidal ideations, no overt delusions elicited  Associations: concrete associations  Perceptual disturbances: denies Auditory/Visual/Tactile Hallucinations  Orientation: oriented to time, person, place and to the situational context  Cognitive Function: intact  Memory: recent and remote memory grossly intact  Intellect: unable to assess  Fund of knowledge: aware of current events, aware of past history, and vocabulary average  Impulse control: good  Insight/judgment: fair/fair    Pain: reported having pain in L knee  Pain scale: 6      Vital signs in last 24 hours:    Temp:  [97 °F (36.1 °C)-97.1 °F (36.2 °C)] 97.1 °F (36.2 °C)  HR:  [] 89  Resp:  [16-18] 16  BP: (114-150)/(75-79) 150/79    Laboratory results: I have personally reviewed all pertinent laboratory/tests results    Results from the past 24 hours:   Recent Results (from the past 24 hour(s))   UA w Reflex to Microscopic w Reflex to Culture    Collection Time: 11/13/23 11:32 AM    Specimen: Urine, Clean Catch   Result Value Ref Range    Color, UA Maria Guadalupe (A) Straw, Yellow, Pale Yellow    Clarity, UA Clear Clear, Other    Specific Gravity, UA 1.025 1.003 - 1.040    pH, UA 5.0 4.5, 5.0, 5.5, 6.0, 6.5, 7.0, 7.5, 8.0    Leukocytes, UA 25.0 (A) Negative    Nitrite, UA Negative Negative    Protein, UA 15 (Trace) (A) Negative mg/dl    Glucose, UA Negative Negative mg/dl    Ketones, UA Negative Negative mg/dl    Bilirubin, UA Negative Negative    Occult Blood, UA 10.0 (A) Negative    UROBILINOGEN UA Negative 1.0, Negative mg/dL   Urine Microscopic    Collection Time: 11/13/23 11:32 AM   Result Value Ref Range    RBC, UA 0-1 None Seen, 0-1, 1-2, 2-4, 0-5 /hpf    WBC, UA 10-20 (A) None Seen, 0-1, 1-2, 0-5, 2-4 /hpf    Epithelial Cells Occasional None Seen, Occasional /hpf    Bacteria, UA Moderate (A) None Seen, Occasional /hpf    MUCUS THREADS Moderate (A) None Seen       Progress Toward Goals: gradual improvement    Assessment:  Principal Problem:    Schizoaffective disorder, bipolar type ([facility])  Active Problems:    Benign essential hypertension    Edema    Hypothyroidism    Overactive bladder    Sjogren's syndrome ([facility])    COPD (chronic obstructive pulmonary disease) ([facility])    Medical clearance for psychiatric admission    History of pulmonary embolism    Grief        Plan:  - Legal status: 303  - f/u SLIM recs regarding the medical problems  - Haldol Dec 50 mg IM q4 weeks (next dose due on 11/22/23)  - The patient has a history of at least 3 antipsychotic medication trials and at this time requires treatment with 2 antipsychotic agents due to multiple failed trials of monotherapy  - Continue medication titration and treatment plan; adjust medication to optimize treatment response and as clinically indicated.      Scheduled medications:  Current Facility-Administered Medications   Medication Dose Route Frequency Provider Last Rate    albuterol  2 puff Inhalation Q4H PRN Radha Smyth PA-C      atoMOXetine  40 mg Oral Daily Luigi Prajapati DO      buPROPion  300 mg Oral Daily JHONATAN Nicholson      cholecalciferol  1,000 Units Oral Daily JHONATAN Nicholson cloNIDine  0.1 mg Oral Q12H [address] [facility], CRNP      furosemide  20 mg Oral Daily Zena Wellington      [START ON 11/22/2023]  haloperidol decanoate  50 mg Intramuscular - haloperidol decanoate Q28 Days Tish Austin MD      hydrOXYzine HCL  25 mg Oral Q6H PRN Max 4/day Haven Dalorene, CRNP      hydrOXYzine HCL  50 mg Oral Q6H PRN Max 4/day Haven Dalorene, CRNP      ibuprofen  200 mg Oral Q6H PRN Haven Dauer, CRNP      ibuprofen  400 mg Oral Q6H PRN Haven Dauer, CRNP      ibuprofen  600 mg Oral Q8H PRN Haven Dalorene, CRNP      levothyroxine  125 mcg Oral Early Morning Sanjay steele PA-C      LORazepam  1 mg Intramuscular Q6H PRN Max 3/day Haven Dalorene, CRNP      LORazepam  1 mg Oral Q6H PRN Max 3/day Haven Dalorene, CRNP      losartan  50 mg Oral Daily Sanjay steele PA-C      lurasidone  60 mg Oral Daily With Breakfast Havecollin Ding, CRNP      naltrexone  50 mg Oral Daily Haven Dalorene, CRNP      nystatin   Topical BID Sanjay steele PA-C      OLANZapine  5 mg Intramuscular Q3H PRN Max 3/day Haven Dalorene, CRNP      OLANZapine  2.5 mg Oral Q4H PRN Max 6/day Haven Dalorene, CRNP      OLANZapine  5 mg Oral Q4H PRN Max 3/day Haven Dalorene, CRNP      OLANZapine  5 mg Oral Q3H PRN Max 3/day Haven Dalorene, CRNP      OXcarbazepine  300 mg Oral Q12H [facility] Tish Austin MD      polyethylene glycol  17 g Oral Daily PRN Haven Dalorene, LAURANP      senna-docusate sodium  1 tablet Oral Daily PRN Haven Dalorene, CRNP      topiramate  200 mg Oral BID Havecollin Ding, LAURANP      traZODone  50 mg Oral HS PRN Havecollin Ding, LAURANP      warfarin  5 mg Oral Daily (warfarin) Ingris No PA-C          PRN:    albuterol    hydrOXYzine HCL    hydrOXYzine HCL    ibuprofen    ibuprofen    ibuprofen    LORazepam    LORazepam    OLANZapine    OLANZapine    OLANZapine    OLANZapine    polyethylene glycol    senna-docusate sodium    traZODone    - Observation: routine    - VS: as per unit protocol  - Diet: Regular diet  - Psychoeducation (benefits and potential risks) discussed, importance of compliance with the psychiatric treatment reiterated, and the patient verbalized understanding of the matter  - Encourage group attendance and milieu therapy    - The pt was educated and agreed to verbalize any suicidal thoughts, frustrations or concerns to the nursing staff, immediately. - Dispo: TBD       Next of Kin  Extended Emergency Contact Information  Primary Emergency Contact: Luigi Mason  Address: [address] [place] [address] of [address] [place] Phone: [phone]  Mobile Phone: [phone]  Relation: Father  Secondary Emergency Contact: Leonor Ji  Address: [address] of [address] [place] Phone: [phone]  Mobile Phone: [phone]  Relation: Friend      Counseling / Coordination of Care  Patient's progress discussed with staff in treatment team meeting. Medications, treatment progress and treatment plan reviewed with patient. Medication changes discussed with patient. Medication education provided to patient. Coping with stress reviewed with patient. Coping skills reviewed with patient. Importance of follow up for substance abuse issues discussed with patient. Supportive therapy provided to patient. Cognitive techniques utilized during the session. Reassurance and supportive therapy provided. Reoriented to reality and reassured. Encouraged participation in milieu and group therapy on the unit. Crisis/safety plan discussed with patient.      Devonte Garcia MD  Attending [facility]

## 2023-11-13 NOTE — PLAN OF CARE
Pt attends groups and participates.     Problem: Ineffective Coping  Goal: Participates in unit activities  Description: Interventions:  - Provide therapeutic environment   - Provide required programming   - Redirect inappropriate behaviors   Outcome: Progressing
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
## 2023-11-13 NOTE — PROGRESS NOTES
11/13/23 1156   Admission   Release of Information Signed Yes  ([facility]; [address];  Priscilla Course (father) [phone])

## 2023-11-14 ENCOUNTER — PATIENT OUTREACH (OUTPATIENT)
Dept: CASE MANAGEMENT | Facility: HOSPITAL | Age: 53
End: 2023-11-14

## 2023-11-14 LAB
BACTERIA UR CULT: NORMAL
INR PPP: 1.87 (ref 0.84–1.19)
PROTHROMBIN TIME: 21.8 SECONDS (ref 11.6–14.5)

## 2023-11-14 PROCEDURE — 85610 PROTHROMBIN TIME: CPT | Performed by: PHYSICIAN ASSISTANT

## 2023-11-14 PROCEDURE — 99232 SBSQ HOSP IP/OBS MODERATE 35: CPT | Performed by: STUDENT IN AN ORGANIZED HEALTH CARE EDUCATION/TRAINING PROGRAM

## 2023-11-14 RX ORDER — LANOLIN ALCOHOL/MO/W.PET/CERES
3 CREAM (GRAM) TOPICAL
Status: DISCONTINUED | OUTPATIENT
Start: 2023-11-14 | End: 2023-11-17 | Stop reason: HOSPADM

## 2023-11-14 RX ORDER — TRAZODONE HYDROCHLORIDE 100 MG/1
100 TABLET ORAL
Status: DISCONTINUED | OUTPATIENT
Start: 2023-11-14 | End: 2023-11-17 | Stop reason: HOSPADM

## 2023-11-14 RX ORDER — WARFARIN SODIUM 6 MG/1
6 TABLET ORAL
Status: DISCONTINUED | OUTPATIENT
Start: 2023-11-14 | End: 2023-11-17 | Stop reason: HOSPADM

## 2023-11-14 RX ADMIN — CEFPODOXIME PROXETIL 400 MG: 200 TABLET, FILM COATED ORAL at 08:32

## 2023-11-14 RX ADMIN — TOPIRAMATE 200 MG: 100 TABLET, FILM COATED ORAL at 08:31

## 2023-11-14 RX ADMIN — CHOLECALCIFEROL TAB 25 MCG (1000 UNIT) 1000 UNITS: 25 TAB at 08:31

## 2023-11-14 RX ADMIN — NYSTATIN: 100000 POWDER TOPICAL at 08:34

## 2023-11-14 RX ADMIN — CEFPODOXIME PROXETIL 400 MG: 200 TABLET, FILM COATED ORAL at 17:17

## 2023-11-14 RX ADMIN — LEVOTHYROXINE SODIUM 125 MCG: 125 TABLET ORAL at 05:43

## 2023-11-14 RX ADMIN — NALTREXONE HYDROCHLORIDE 50 MG: 50 TABLET, FILM COATED ORAL at 08:31

## 2023-11-14 RX ADMIN — CLONIDINE HYDROCHLORIDE 0.1 MG: 0.1 TABLET ORAL at 08:31

## 2023-11-14 RX ADMIN — OXCARBAZEPINE 300 MG: 300 TABLET, FILM COATED ORAL at 21:07

## 2023-11-14 RX ADMIN — ATOMOXETINE 40 MG: 40 CAPSULE ORAL at 08:31

## 2023-11-14 RX ADMIN — HYDROXYZINE HYDROCHLORIDE 50 MG: 50 TABLET, FILM COATED ORAL at 10:13

## 2023-11-14 RX ADMIN — OXCARBAZEPINE 300 MG: 300 TABLET, FILM COATED ORAL at 08:31

## 2023-11-14 RX ADMIN — LOSARTAN POTASSIUM 50 MG: 50 TABLET, FILM COATED ORAL at 08:31

## 2023-11-14 RX ADMIN — MELATONIN TAB 3 MG 3 MG: 3 TAB at 21:07

## 2023-11-14 RX ADMIN — LORAZEPAM 1 MG: 1 TABLET ORAL at 01:09

## 2023-11-14 RX ADMIN — FUROSEMIDE 20 MG: 20 TABLET ORAL at 08:31

## 2023-11-14 RX ADMIN — WARFARIN SODIUM 6 MG: 6 TABLET ORAL at 17:17

## 2023-11-14 RX ADMIN — TRAZODONE HYDROCHLORIDE 100 MG: 100 TABLET ORAL at 21:11

## 2023-11-14 RX ADMIN — TOPIRAMATE 200 MG: 100 TABLET, FILM COATED ORAL at 17:17

## 2023-11-14 RX ADMIN — BUPROPION HYDROCHLORIDE 300 MG: 300 TABLET, EXTENDED RELEASE ORAL at 08:31

## 2023-11-14 RX ADMIN — CLONIDINE HYDROCHLORIDE 0.1 MG: 0.1 TABLET ORAL at 21:07

## 2023-11-14 RX ADMIN — LURASIDONE HYDROCHLORIDE 60 MG: 40 TABLET, COATED ORAL at 08:31

## 2023-11-14 NOTE — NURSING NOTE
Patient endorses anxiety thinking about her situation and requested prn. HAM score is 18. Prn atarax 50 mg was administered at 2120.

## 2023-11-14 NOTE — PROGRESS NOTES
Progress Note - Keaton Sanders 47 y.o. female MRN: [id number]  Unit/Bed#: Tonie Sellers 721-99 Encounter: [id number]       Patient was visited on unit for continuing care; chart reviewed and discussed with multidisciplinary treatment team. On approach, the patient was calm and cooperative. Reported feeling better in general, and looks forward to getting discharged, reportedly misses her cat. Denied any changes in appetite, and energy level. She reported problem falling asleep last night and related the issue to being in a new environment. She noted that she is not interested in receiving scheduled sleep medication though and usually sleeps well at home. Denied A/VH currently. Denied SI/HI, intent or plan upon direct inquiry at this time. Patient continues to be visible in the milieu and interacts with staff and peers. No reports of aggression or self-injurious behavior on unit. Received trazodone 50 mg at 2120 last night for insomnia with poor response and Ativan 1 mg p.o. as needed at 0109 for anxiety. Patient accepted all offered medications and reported feeling better. No adverse effects of medications noted or reported. The patient reportedly received Haldol Dec 50 mg IM (monthly dose) on 10/25/23 (next dose due on 11/22/23). She was last discharged on Latuda 40 mg BID which restarted as 40 mg daily and then increased to 60 mg daily by the consult team, and also Depakote was discontinued and started on Trileptal 150 mg BID which uptitrated to 300 mg BID on 11/13/23. Started on Melatonin 3 mg nightly for sleep and Trazodone PRN increased to 100 mg. Doses to be adjusted as indicated.      Current Mental Status Evaluation:  Appearance and attitude: appeared as stated age, casually dressed, wearing eyeglasses, with good hygiene  Eye contact: good  Motor Function: within normal limits, intact gait, No PMA/PMR  Gait/station: normal gait/station and normal balance  Speech: normal for rate, rhythm, volume, latency, amount  Language: No overt abnormality  Mood/affect: less anxious, less depressed / Affect was constricted but reactive, mood congruent  Thought Processes: sequential and goal-directed  Thought content: denies suicidal ideation or homicidal ideation; no delusions or first rank symptoms  Associations: concrete associations  Perceptual disturbances: denies Auditory/Visual/Tactile Hallucinations  Orientation: oriented to time, person, place and to the situational context  Cognitive Function: intact  Memory: recent and remote memory grossly intact  Intellect: unable to assess  Fund of knowledge: aware of current events, aware of past history, and vocabulary average  Impulse control: good  Insight/judgment: fair/fair    Vital signs in last 24 hours:    Temp:  [97.7 °F (36.5 °C)-98.3 °F (36.8 °C)] 97.7 °F (36.5 °C)  HR:  [88-91] 88  Resp:  [16] 16  BP: (139-164)/(67-95) 139/67    Laboratory results: I have personally reviewed all pertinent laboratory/tests results    Results from the past 24 hours:   Recent Results (from the past 24 hour(s))   Protime-INR    Collection Time: 11/14/23  5:55 AM   Result Value Ref Range    Protime 21.8 (H) 11.6 - 14.5 seconds    INR 1.87 (H) 0.84 - 1.19       Progress Toward Goals: some improvement    Assessment:  Principal Problem:    Schizoaffective disorder, bipolar type ([address])  Active Problems:    Benign essential hypertension    Edema    Hypothyroidism    Overactive bladder    Sjogren's syndrome (HCC)    COPD (chronic obstructive pulmonary disease) ([facility])    Medical clearance for psychiatric admission    History of pulmonary embolism    Grief        Plan:  - f/u SLIM recs regarding the medical problems  - Haldol Dec 50 mg IM q4 weeks (next dose due on 11/22/23)  - Continue medication titration and treatment plan; adjust medication to optimize treatment response and as clinically indicated.      Scheduled medications:  Current Facility-Administered Medications Medication Dose Route Frequency Provider Last Rate    albuterol  2 puff Inhalation Q4H PRN Miranda Mendez PA-C      atoMOXetine  40 mg Oral Daily Hilda Prajapati DO      buPROPion  300 mg Oral Daily JHONATAN Houser      cefpodoxime  400 mg Oral BID With Meals Smithwickmarisela Patino, JHONATAN      cholecalciferol  1,000 Units Oral Daily Rizwan Trejo, JHONATAN      cloNIDine  0.1 mg Oral Q12H [address] [place] [address], JHONATAN      furosemide  20 mg Oral Daily Zena Rondon      [START ON 11/22/2023]  haloperidol decanoate  50 mg Intramuscular - haloperidol decanoate Q28 Days Roel Schmidt MD      hydrOXYzine HCL  25 mg Oral Q6H PRN Max 4/day Rizwan Trejo, JHONATAN      hydrOXYzine HCL  50 mg Oral Q6H PRN Max 4/day Rizwan Trejo, JHONATAN      ibuprofen  200 mg Oral Q6H PRN Rizwan Trejo, JHONATAN      ibuprofen  400 mg Oral Q6H PRN Rizwan Trejo, JHONATAN      ibuprofen  600 mg Oral Q8H PRN JHONATAN Houser      levothyroxine  125 mcg Oral Early Morning Miranda Mendez PA-C      LORazepam  1 mg Intramuscular Q6H PRN Max 3/day JHONATAN Houser      LORazepam  1 mg Oral Q6H PRN Max 3/day Rizwan Trejo, JHONATAN      losartan  50 mg Oral Daily Miranda Mendez PA-C      lurasidone  60 mg Oral Daily With Breakfast JHONATAN Houser      melatonin  3 mg Oral HS Roel Schmidt MD      naltrexone  50 mg Oral Daily Rizwan Trejo, JHONATAN      nystatin   Topical BID Miranda Mendez PA-C      OLANZapine  5 mg Intramuscular Q3H PRN Max 3/day JHONATAN Houser      OLANZapine  2.5 mg Oral Q4H PRN Max 6/day JHONATAN Houser      OLANZapine  5 mg Oral Q4H PRN Max 3/day Rizwan Trejo, JHONATAN      OLANZapine  5 mg Oral Q3H PRN Max 3/day Rizwan Trejo, JHONATAN      OXcarbazepine  300 mg Oral Q12H Tenzin Sargent MD      polyethylene glycol  17 g Oral Daily PRN JHONATAN Houser      senna-docusate sodium  1 tablet Oral Daily PRN Rizwan Trejo, JHONATAN      topiramate  200 mg Oral BID JHONATAN Houser      traZODone  100 mg Oral HS PRN Samantha Montes De Oca MD      warfarin  5 mg Oral Daily (warfarin) Raul Olsen PA-C          PRN:    albuterol    hydrOXYzine HCL    hydrOXYzine HCL    ibuprofen    ibuprofen    ibuprofen    LORazepam    LORazepam    OLANZapine    OLANZapine    OLANZapine    OLANZapine    polyethylene glycol    senna-docusate sodium    traZODone    - Observation: routine    - VS: as per unit protocol  - Diet: Regular diet  - Psychoeducation (benefits and potential risks) discussed, importance of compliance with the psychiatric treatment reiterated, and the patient verbalized understanding of the matter  - Encourage group attendance and milieu therapy    - The pt was educated and agreed to verbalize any suicidal thoughts, frustrations or concerns to the nursing staff, immediately. - Dispo: TBD       Next of Kin  Extended Emergency Contact Information  Primary Emergency Contact: FerminspeedyLuigi  Address: [address] [place] [address] of [address] [place] Phone: [phone]  Mobile Phone: [phone]  Relation: Father  Secondary Emergency Contact: Homero Jimy  Address: [address] [facility], [address] [place] [address] of [address] [facility] Phone: [phone]  Mobile Phone: [phone]  Relation: Friend      Counseling / Coordination of Care  Patient's progress discussed with staff in treatment team meeting. Medications, treatment progress and treatment plan reviewed with patient. Medication changes discussed with patient. Coping with anxiety and stress reviewed with patient. Coping skills reviewed with patient. Supportive therapy provided to patient. Cognitive techniques utilized during the session. Reassurance and supportive therapy provided. Reoriented to reality and reassured. Encouraged participation in milieu and group therapy on the unit. Crisis/safety plan discussed with patient.      Samantha Montes De Oca MD  Attending [address] [place] [address] [facility]

## 2023-11-14 NOTE — NURSING NOTE
Pt anxious and irritable at times with constricted affect. Good appetite and steady gait. VSS. Med-compliant. Denied SI. Monitored for safety and support.

## 2023-11-14 NOTE — PROGRESS NOTES
11/14/23 1330   Activity/Group Checklist   Group Pet therapy   Attendance Attended   Attendance Duration (min) 16-30   Interactions Interacted appropriately  (Pt. interacitve with therpy dog and peers in her immediate sitting area.)   Affect/Mood Appropriate;Bright;Normal range   Goals Achieved Able to engage in interactions

## 2023-11-14 NOTE — NURSING NOTE
Patient requested prn trazodone for insomnia at night time medication pass. Trazodone 50 mg was administered at 2120.

## 2023-11-14 NOTE — PROGRESS NOTES
11/14/23 0751   Team Meeting   Meeting Type Daily Rounds   Initial Conference Date 11/14/23   Next Conference Date 11/15/23   Team Members Present   Team Members Present Physician;Nurse;;; Occupational Therapist   Physician Team Member Dr Donald Mackey, Dr Aleja Olivares Team Member [facility] Management Team Member [address] [place] [address] Work Team Member Claudia TALBOT Team Member Efren Haines   Patient/Family Present   Patient Present No   Patient's Family Present No     Calm, cooperative, med comp, discharge Friday.

## 2023-11-14 NOTE — QUICK NOTE
INR goal 2-3, INR still subtherapeutic at 1.87. D8 of warfarin, has received 5 mg x 2 days without improvement. Will increase to 6 mg daily and recheck INR in a.m. Patient on 6 mg daily prior to admission. Urine culture still pending, continue Vantin and follow-up urine culture results. Patient has remained afebrile. Hemodynamically stable.

## 2023-11-14 NOTE — TREATMENT TEAM
Pt completed relapse prevention plan. Pt signed and copy in chart. Pt noted signs and symptoms upon admission as : "Depression, bored and overdose". Crisis, 988 and warmline phone numbers provided. Pt attends groups and make needs known. 11/14/23 1415   Activity/Group Checklist   Group Admission/Discharge   Attendance Attended   Attendance Duration (min) 16-30   Interactions Interacted appropriately   Affect/Mood Appropriate   Goals Achieved Identified feelings; Discussed coping strategies; Able to listen to others; Able to engage in interactions

## 2023-11-14 NOTE — PLAN OF CARE

## 2023-11-14 NOTE — NURSING NOTE
Patient is awake and approached nurses station reporting anxiety. Patient is tearful, and states that she's upset about her current situation and would like to go home. Patient states she tried other coping mechanism before approaching this writer. HAM score is 29. Prn ativan 1 mg was administered at 0109.

## 2023-11-14 NOTE — CASE MANAGEMENT
303 hearing completed on 11/13 with [facility] MH/DS. Pt did attend. Dr. Blu Park participated and testified. Up to 20 additional days of IP psych tx approved. 303 expires on 12/3.

## 2023-11-14 NOTE — NURSING NOTE
Patient is calm and cooperative with care. Denies anxiety, depression, SI/HI/AH/VH. Medication compliant. Patient is visible in the dayroom sociable with peers. Patient encouraged to make needs known. Safety checks ongoing.

## 2023-11-14 NOTE — NURSING NOTE
Upon reassessment prn atarax 50 mg was mildly effective. Patient states she feels "a little better".

## 2023-11-14 NOTE — NURSING NOTE
Upon reassessment, prn ativan 1 mg  appears effective. Patient is sleeping, easily aroused. Safety checks ongoing.

## 2023-11-14 NOTE — PROGRESS NOTES
RAIN HERNANDEZ reviewed chart. Pt is admitted to [facility] at [facility]. Pt is admitted on 303. RAIN HERNANDEZ will continue to review chart as needed and remain available for OP CM needs when pt is d/c.

## 2023-11-15 ENCOUNTER — PATIENT OUTREACH (OUTPATIENT)
Dept: CASE MANAGEMENT | Facility: HOSPITAL | Age: 53
End: 2023-11-15

## 2023-11-15 LAB
INR PPP: 2.15 (ref 0.84–1.19)
PROTHROMBIN TIME: 24.3 SECONDS (ref 11.6–14.5)

## 2023-11-15 PROCEDURE — 99232 SBSQ HOSP IP/OBS MODERATE 35: CPT | Performed by: STUDENT IN AN ORGANIZED HEALTH CARE EDUCATION/TRAINING PROGRAM

## 2023-11-15 PROCEDURE — 85610 PROTHROMBIN TIME: CPT

## 2023-11-15 RX ADMIN — ATOMOXETINE 40 MG: 40 CAPSULE ORAL at 08:21

## 2023-11-15 RX ADMIN — TRAZODONE HYDROCHLORIDE 100 MG: 100 TABLET ORAL at 21:15

## 2023-11-15 RX ADMIN — TOPIRAMATE 200 MG: 100 TABLET, FILM COATED ORAL at 17:08

## 2023-11-15 RX ADMIN — OXCARBAZEPINE 300 MG: 300 TABLET, FILM COATED ORAL at 08:10

## 2023-11-15 RX ADMIN — NALTREXONE HYDROCHLORIDE 50 MG: 50 TABLET, FILM COATED ORAL at 08:10

## 2023-11-15 RX ADMIN — CEFPODOXIME PROXETIL 400 MG: 200 TABLET, FILM COATED ORAL at 16:57

## 2023-11-15 RX ADMIN — CLONIDINE HYDROCHLORIDE 0.1 MG: 0.1 TABLET ORAL at 08:10

## 2023-11-15 RX ADMIN — LURASIDONE HYDROCHLORIDE 60 MG: 40 TABLET, COATED ORAL at 08:21

## 2023-11-15 RX ADMIN — WARFARIN SODIUM 6 MG: 6 TABLET ORAL at 17:08

## 2023-11-15 RX ADMIN — LOSARTAN POTASSIUM 50 MG: 50 TABLET, FILM COATED ORAL at 08:10

## 2023-11-15 RX ADMIN — FUROSEMIDE 20 MG: 20 TABLET ORAL at 08:10

## 2023-11-15 RX ADMIN — LEVOTHYROXINE SODIUM 125 MCG: 125 TABLET ORAL at 06:13

## 2023-11-15 RX ADMIN — BUPROPION HYDROCHLORIDE 300 MG: 300 TABLET, EXTENDED RELEASE ORAL at 08:10

## 2023-11-15 RX ADMIN — NYSTATIN: 100000 POWDER TOPICAL at 08:21

## 2023-11-15 RX ADMIN — OXCARBAZEPINE 300 MG: 300 TABLET, FILM COATED ORAL at 21:15

## 2023-11-15 RX ADMIN — CHOLECALCIFEROL TAB 25 MCG (1000 UNIT) 1000 UNITS: 25 TAB at 08:10

## 2023-11-15 RX ADMIN — CEFPODOXIME PROXETIL 400 MG: 200 TABLET, FILM COATED ORAL at 08:21

## 2023-11-15 RX ADMIN — CLONIDINE HYDROCHLORIDE 0.1 MG: 0.1 TABLET ORAL at 21:15

## 2023-11-15 RX ADMIN — MELATONIN TAB 3 MG 3 MG: 3 TAB at 21:15

## 2023-11-15 RX ADMIN — TOPIRAMATE 200 MG: 100 TABLET, FILM COATED ORAL at 08:10

## 2023-11-15 NOTE — NURSING NOTE
Patient visible on the unit sitting with peers in the dayroom for the majority of the shift, social with staff on approach and to make needs known. Patient soft spoken and minimal in conversation, reporting "some" depression and anxiety although states this is improving. Denies SI/HI/AVH. Patient compliant with medications and meals, appetite good. PRN trazodone given with HS medications at 2111. No further signs of distress noted.

## 2023-11-15 NOTE — NURSING NOTE
Pt pleasant and med-compliant with good appetite and steady gait. VSS. Did not attend group. Denied SI. Monitored for safety and support.

## 2023-11-15 NOTE — PROGRESS NOTES
11/15/23 1100   Activity/Group Checklist   Group Life Skills  (topic happiness,52 things to try once in life.)   Attendance Attended   Attendance Duration (min) 31-45   Interactions Interacted appropriately  (Pt.appropriately recalled many experiances that made her life garcia in the past.P.t social and smiling appropriately.)   Affect/Mood Appropriate;Bright;Normal range   Goals Achieved Able to engage in interactions; Discussed coping strategies

## 2023-11-15 NOTE — PROGRESS NOTES
Outpatient Care Management Note: IB ADT alert message received. Chart notes reviewed. Pt transitioned to [facility] 11/10/23 and continues to be currently admitted. Outreach call placed to Sam Whyte. Left detailed message on VM informing pt we will continue to follow her throughout her admission and will be available to her when she is discharged. Contact information also provided. Transitions:   Type: Unplanned  Provider notification within 2 days of discharge  PCP: Saima Tomlin  Notified via: ADT alert  Specialty Provider: [facility]   Notified via: previously notified of admission by pt and ED   Other Care Team Member: JUDY Pérez  Notified via: ADT alert  Collaboration with discharge team: reviewed HP IP/OP [facility] and reviewed Case Management notes  Communication of changes to care plans to patient/caregiver: VM left to Sam Whyte. Plan to follow up at discharge.      Note routed to Konstantin Callahan RN CM on care team.

## 2023-11-15 NOTE — PROGRESS NOTES
Progress Note - Keaton Sanders 47 y.o. female MRN: [id number]  Unit/Bed#: [facility] 139-92 Encounter: [id number]       Patient was visited on unit for continuing care; chart reviewed and discussed with multidisciplinary treatment team. On approach, the patient was calm and cooperative. She reported feeling better in general, and reportedly slept well last night after took Trazodone PRN. Denied any changes in appetite, and energy level. No problem initiating and maintaining sleep. Denied A/VH currently. Denied SI/HI, intent or plan upon direct inquiry at this time and appeared future oriented. She noted that she wants to take a couple of weeks off after getting discharged to relax and spend time with her cat. Patient continues to be visible in the milieu and interacts with staff and peers. No reports of aggression or self-injurious behavior on unit. Received Atarax 50 mg p.o. as needed at 1013 yesterday for anxiety and trazodone 100 mg p.o. as needed at 2111 for insomnia. Patient accepted all offered medications and reported feeling better. No adverse effects of medications noted or reported. The patient reportedly received Haldol Dec 50 mg IM (monthly dose) on 10/25/23 (next dose due on 11/22/23). She was last discharged on Latuda 40 mg BID which restarted as 40 mg daily and then increased to 60 mg daily by the consult team, and also Depakote was discontinued and started on Trileptal 150 mg BID which uptitrated to 300 mg BID on 11/13/23. Started on Melatonin 3 mg nightly for sleep and Trazodone PRN increased to 100 mg on 11/15/23. Doses to be adjusted as indicated. Tentative discharge on Friday.      Current Mental Status Evaluation:  Appearance and attitude: appeared as stated age, casually dressed, wearing eyeglasses, with good hygiene  Eye contact: good  Motor Function: within normal limits, intact gait, No PMA/PMR  Gait/station: normal gait/station and normal balance  Speech: normal for rate, rhythm, volume, latency, amount  Language: No overt abnormality  Mood/affect:  "better"  / Affect was constricted but reactive, mood congruent  Thought Processes: sequential and goal-directed  Thought content: denies suicidal ideation or homicidal ideation; no delusions or first rank symptoms  Associations: concrete associations  Perceptual disturbances: denies Auditory/Visual/Tactile Hallucinations  Orientation: oriented to time, person, place and to the situational context  Cognitive Function: intact  Memory: recent and remote memory grossly intact  Intellect: unable to assess  Fund of knowledge: aware of current events, aware of past history, and vocabulary average  Impulse control: good  Insight/judgment: fair/fair    Vital signs in last 24 hours:    Temp:  [97.3 °F (36.3 °C)-97.6 °F (36.4 °C)] 97.3 °F (36.3 °C)  HR:  [81-95] 81  Resp:  [17-18] 18  BP: (141-146)/(74-81) 146/81    Laboratory results: I have personally reviewed all pertinent laboratory/tests results    Results from the past 24 hours:   Recent Results (from the past 24 hour(s))   Protime-INR    Collection Time: 11/15/23  5:54 AM   Result Value Ref Range    Protime 24.3 (H) 11.6 - 14.5 seconds    INR 2.15 (H) 0.84 - 1.19       Progress Toward Goals: improving    Assessment:  Principal Problem:    Schizoaffective disorder, bipolar type (HCC)  Active Problems:    Benign essential hypertension    Edema    Hypothyroidism    Overactive bladder    Sjogren's syndrome (HCC)    COPD (chronic obstructive pulmonary disease) ([facility])    Medical clearance for psychiatric admission    History of pulmonary embolism    Grief        Plan:  - f/u [facility] recs regarding the medical problems  - Haldol Dec 50 mg IM q4 weeks (next dose due on 11/22/23)  - Continue medication titration and treatment plan; adjust medication to optimize treatment response and as clinically indicated.      Scheduled medications:  Current Facility-Administered Medications   Medication Dose Route Frequency Provider Last Rate    albuterol  2 puff Inhalation Q4H PRN Kaylee Duarte PA-C      atoMOXetine  40 mg Oral Daily Marycruz Prajapati DO      buPROPion  300 mg Oral Daily Nicki Julissa, CRNP      cefpodoxime  400 mg Oral BID With Meals Lizett Emily, CRELKIN      cholecalciferol  1,000 Units Oral Daily Nicki Madura, CRNP      cloNIDine  0.1 mg Oral Q12H [address] [place] [address], CRNP      furosemide  20 mg Oral Daily Zena Parra      [START ON 11/22/2023]  haloperidol decanoate  50 mg Intramuscular - haloperidol decanoate Q28 Days Chyrel Castleman, MD      hydrOXYzine HCL  25 mg Oral Q6H PRN Max 4/day Nicki Madura, CRNP      hydrOXYzine HCL  50 mg Oral Q6H PRN Max 4/day Nicki Madura, CRNP      ibuprofen  200 mg Oral Q6H PRN Nicki Madura, CRNP      ibuprofen  400 mg Oral Q6H PRN Nicki Madura, CRNP      ibuprofen  600 mg Oral Q8H PRN Nicki Madura, CRNP      levothyroxine  125 mcg Oral Early Morning Kaylee Duarte PA-C      LORazepam  1 mg Intramuscular Q6H PRN Max 3/day Nicki Madura, CRNP      LORazepam  1 mg Oral Q6H PRN Max 3/day Nicki Madura, CRNP      losartan  50 mg Oral Daily Kaylee Duarte PA-C      lurasidone  60 mg Oral Daily With Breakfast Nicki Madlisset, CRNP      melatonin  3 mg Oral HS Chyrel Castleman, MD      naltrexone  50 mg Oral Daily Nicki Madura, CRNP      nystatin   Topical BID Kaylee Duarte PA-C      OLANZapine  5 mg Intramuscular Q3H PRN Max 3/day Nicki Madura, CRNP      OLANZapine  2.5 mg Oral Q4H PRN Max 6/day Nicki Madura, CRNP      OLANZapine  5 mg Oral Q4H PRN Max 3/day Nicki Madura, CRNP      OLANZapine  5 mg Oral Q3H PRN Max 3/day Nicki Madura, CRNP      OXcarbazepine  300 mg Oral Q12H Magno Donovan MD      polyethylene glycol  17 g Oral Daily PRN Nicki Madura, CRNP      senna-docusate sodium  1 tablet Oral Daily PRN Nicki Madura, CRNP      topiramate  200 mg Oral BID Nicki Madura, CRNP      traZODone  100 mg Oral HS PRN Tiara Carballo MD      warfarin  6 mg Oral Daily (warfarin) Demond Nair PA-C          PRN:    albuterol    hydrOXYzine HCL    hydrOXYzine HCL    ibuprofen    ibuprofen    ibuprofen    LORazepam    LORazepam    OLANZapine    OLANZapine    OLANZapine    OLANZapine    polyethylene glycol    senna-docusate sodium    traZODone    - Observation: routine    - VS: as per unit protocol  - Diet: Regular diet  - Psychoeducation (benefits and potential risks) discussed, importance of compliance with the psychiatric treatment reiterated, and the patient verbalized understanding of the matter  - Encourage group attendance and milieu therapy    - The pt was educated and agreed to verbalize any suicidal thoughts, frustrations or concerns to the nursing staff, immediately. - Dispo: TBD       Next of Kin  Extended Emergency Contact Information  Primary Emergency Contact: Luigi Mason  Address: [address] [place] [address] of [address] [employer] Phone: [phone]  Mobile Phone: [phone]  Relation: Father  Secondary Emergency Contact: GarrisonLeonor  Address: [address] [place] [address] of [address] [employer] Phone: [phone]  Mobile Phone: [phone]  Relation: Friend      Counseling / Coordination of Care  Patient's progress discussed with staff in treatment team meeting. Medications, treatment progress and treatment plan reviewed with patient. Medication education provided to patient. Coping skills reviewed with patient. Importance of follow up for substance abuse issues discussed with patient. Supportive therapy provided to patient. Cognitive techniques utilized during the session. Reassurance and supportive therapy provided. Encouraged participation in milieu and group therapy on the unit. Crisis/safety plan discussed with patient.      Tiara Carballo MD  Attending [address] [place] [address]

## 2023-11-15 NOTE — CASE MANAGEMENT
Called [facility] and spoke to Mert Fontenot to advise that the patient will be discharged on 11/17. She advised that the ACT team will be out to see the patient on Monday. Mert Fontenot then transferred me to the nurse, Jeanine, who informed this writer that the patient was due for her haldol dec on 11/22 and she received 50 mg. Since she will be home at that time they will be giving her the SNOWDEN when it is due. They request that at discharge we fax the labs, discharge summary, med list, and psych eval to them at [phone].

## 2023-11-15 NOTE — NURSING NOTE
Patient calm and cooperative. Visible on unit; social with select peers. Compliant with meals and medications. Hopeful for discharge this week. Denies all s/s at this time. Safety checks ongoing.

## 2023-11-15 NOTE — PROGRESS NOTES
11/15/23 0757   Team Meeting   Meeting Type Daily Rounds   Initial Conference Date 11/15/23   Next Conference Date 11/16/23   Team Members Present   Team Members Present Physician;Nurse;;; Occupational Therapist   Physician Team Member Dr Blair Hoskins, Dr Germania Naranjo Team Member [facility] Management Team Member [address] [place] [address] Work Team Member Claudia   OT Team Member [place]   Patient/Family Present   Patient Present No   Patient's Family Present No     Add [address] [facility] - pharmacy: Discharge Friday at 11 am, room was moved and has a somatic panic attack and said she could not walk.  Room moved back

## 2023-11-15 NOTE — TREATMENT TEAM
Pt attended all groups. Pt calm and cooperative. Pt hopeful of d/c on Friday. Pt mentioned that she has a positive friend who she plans on also going to [employer] together. Pt did indicate she is not dependent on the person and does not call her all the time. Pt plans on spending Thanksgiving with her Dad.      11/15/23 1330   Activity/Group Checklist   Group Other (Comment)  (Healthy communication and positive traits)   Attendance Attended   Attendance Duration (min) 31-45   Interactions Interacted appropriately   Affect/Mood Appropriate   Goals Achieved Identified feelings; Discussed coping strategies; Increased hopefulness; Able to listen to others; Able to engage in interactions; Able to reflect/comment on own behavior;Able to manage/cope with feelings;Verbalized increased hopefulness; Able to self-disclose; Able to recieve feedback; Able to give feedback to another

## 2023-11-16 ENCOUNTER — TRANSITIONAL CARE MANAGEMENT (OUTPATIENT)
Dept: FAMILY MEDICINE CLINIC | Facility: CLINIC | Age: 53
End: 2023-11-16

## 2023-11-16 PROCEDURE — 99232 SBSQ HOSP IP/OBS MODERATE 35: CPT | Performed by: STUDENT IN AN ORGANIZED HEALTH CARE EDUCATION/TRAINING PROGRAM

## 2023-11-16 RX ORDER — HALOPERIDOL DECANOATE 50 MG/ML
50 INJECTION INTRAMUSCULAR
Qty: 1 ML | Refills: 0 | Status: ON HOLD
Start: 2023-11-22

## 2023-11-16 RX ORDER — LANOLIN ALCOHOL/MO/W.PET/CERES
3 CREAM (GRAM) TOPICAL
Qty: 30 TABLET | Refills: 1 | Status: ON HOLD | OUTPATIENT
Start: 2023-11-16 | End: 2024-01-15

## 2023-11-16 RX ORDER — LOSARTAN POTASSIUM 50 MG/1
50 TABLET ORAL DAILY
Qty: 30 TABLET | Refills: 1 | Status: SHIPPED | OUTPATIENT
Start: 2023-11-16 | End: 2023-11-17

## 2023-11-16 RX ORDER — NYSTATIN 100000 [USP'U]/G
POWDER TOPICAL 2 TIMES DAILY
Qty: 15 G | Refills: 1 | Status: SHIPPED | OUTPATIENT
Start: 2023-11-16 | End: 2023-11-27 | Stop reason: ALTCHOICE

## 2023-11-16 RX ORDER — MELATONIN
1000 DAILY
Qty: 30 TABLET | Refills: 1 | Status: ON HOLD | OUTPATIENT
Start: 2023-11-16 | End: 2024-01-15

## 2023-11-16 RX ORDER — FUROSEMIDE 20 MG/1
20 TABLET ORAL DAILY
Qty: 30 TABLET | Refills: 1 | Status: ON HOLD | OUTPATIENT
Start: 2023-11-17 | End: 2024-01-16

## 2023-11-16 RX ORDER — BUPROPION HYDROCHLORIDE 300 MG/1
300 TABLET ORAL DAILY
Qty: 30 TABLET | Refills: 1 | Status: ON HOLD | OUTPATIENT
Start: 2023-11-16 | End: 2024-01-15

## 2023-11-16 RX ORDER — CLONIDINE HYDROCHLORIDE 0.1 MG/1
0.1 TABLET ORAL EVERY 12 HOURS SCHEDULED
Qty: 60 TABLET | Refills: 0 | Status: ON HOLD | OUTPATIENT
Start: 2023-11-16 | End: 2023-12-16

## 2023-11-16 RX ORDER — NALTREXONE HYDROCHLORIDE 50 MG/1
50 TABLET, FILM COATED ORAL DAILY
Qty: 30 TABLET | Refills: 1 | Status: ON HOLD | OUTPATIENT
Start: 2023-11-16 | End: 2024-01-15

## 2023-11-16 RX ORDER — LOSARTAN POTASSIUM 50 MG/1
100 TABLET ORAL DAILY
Status: DISCONTINUED | OUTPATIENT
Start: 2023-11-17 | End: 2023-11-17 | Stop reason: HOSPADM

## 2023-11-16 RX ORDER — ATOMOXETINE 40 MG/1
40 CAPSULE ORAL DAILY
Status: DISCONTINUED | OUTPATIENT
Start: 2023-11-17 | End: 2023-11-17 | Stop reason: HOSPADM

## 2023-11-16 RX ORDER — CEFPODOXIME PROXETIL 200 MG/1
400 TABLET, FILM COATED ORAL 2 TIMES DAILY WITH MEALS
Qty: 8 TABLET | Refills: 0 | Status: SHIPPED | OUTPATIENT
Start: 2023-11-16 | End: 2023-11-18

## 2023-11-16 RX ORDER — ATOMOXETINE 40 MG/1
40 CAPSULE ORAL DAILY
Qty: 30 CAPSULE | Refills: 1 | Status: SHIPPED | OUTPATIENT
Start: 2023-11-17 | End: 2023-11-17

## 2023-11-16 RX ORDER — OXCARBAZEPINE 300 MG/1
300 TABLET, FILM COATED ORAL EVERY 12 HOURS SCHEDULED
Qty: 60 TABLET | Refills: 1 | Status: ON HOLD | OUTPATIENT
Start: 2023-11-16 | End: 2024-01-15

## 2023-11-16 RX ORDER — LEVOTHYROXINE SODIUM 0.12 MG/1
125 TABLET ORAL
Qty: 30 TABLET | Refills: 1 | Status: ON HOLD | OUTPATIENT
Start: 2023-11-16 | End: 2024-01-15

## 2023-11-16 RX ORDER — LURASIDONE HYDROCHLORIDE 60 MG/1
60 TABLET, FILM COATED ORAL
Qty: 30 TABLET | Refills: 1 | Status: ON HOLD | OUTPATIENT
Start: 2023-11-17 | End: 2024-01-16

## 2023-11-16 RX ORDER — TRAZODONE HYDROCHLORIDE 100 MG/1
100 TABLET ORAL
Qty: 45 TABLET | Refills: 1 | Status: SHIPPED | OUTPATIENT
Start: 2023-11-16 | End: 2023-11-27 | Stop reason: ALTCHOICE

## 2023-11-16 RX ADMIN — BUPROPION HYDROCHLORIDE 300 MG: 300 TABLET, EXTENDED RELEASE ORAL at 08:04

## 2023-11-16 RX ADMIN — FUROSEMIDE 20 MG: 20 TABLET ORAL at 08:04

## 2023-11-16 RX ADMIN — OXCARBAZEPINE 300 MG: 300 TABLET, FILM COATED ORAL at 21:12

## 2023-11-16 RX ADMIN — CEFPODOXIME PROXETIL 400 MG: 200 TABLET, FILM COATED ORAL at 07:57

## 2023-11-16 RX ADMIN — CLONIDINE HYDROCHLORIDE 0.1 MG: 0.1 TABLET ORAL at 21:12

## 2023-11-16 RX ADMIN — MELATONIN TAB 3 MG 3 MG: 3 TAB at 21:12

## 2023-11-16 RX ADMIN — NYSTATIN: 100000 POWDER TOPICAL at 21:12

## 2023-11-16 RX ADMIN — TRAZODONE HYDROCHLORIDE 100 MG: 100 TABLET ORAL at 21:12

## 2023-11-16 RX ADMIN — CLONIDINE HYDROCHLORIDE 0.1 MG: 0.1 TABLET ORAL at 08:04

## 2023-11-16 RX ADMIN — LURASIDONE HYDROCHLORIDE 60 MG: 40 TABLET, COATED ORAL at 07:58

## 2023-11-16 RX ADMIN — WARFARIN SODIUM 6 MG: 6 TABLET ORAL at 17:16

## 2023-11-16 RX ADMIN — NYSTATIN: 100000 POWDER TOPICAL at 08:05

## 2023-11-16 RX ADMIN — NALTREXONE HYDROCHLORIDE 50 MG: 50 TABLET, FILM COATED ORAL at 08:04

## 2023-11-16 RX ADMIN — TOPIRAMATE 200 MG: 100 TABLET, FILM COATED ORAL at 08:04

## 2023-11-16 RX ADMIN — LOSARTAN POTASSIUM 50 MG: 50 TABLET, FILM COATED ORAL at 08:04

## 2023-11-16 RX ADMIN — TOPIRAMATE 200 MG: 100 TABLET, FILM COATED ORAL at 17:16

## 2023-11-16 RX ADMIN — CEFPODOXIME PROXETIL 400 MG: 200 TABLET, FILM COATED ORAL at 17:16

## 2023-11-16 RX ADMIN — LEVOTHYROXINE SODIUM 125 MCG: 125 TABLET ORAL at 06:08

## 2023-11-16 RX ADMIN — OXCARBAZEPINE 300 MG: 300 TABLET, FILM COATED ORAL at 08:04

## 2023-11-16 RX ADMIN — CHOLECALCIFEROL TAB 25 MCG (1000 UNIT) 1000 UNITS: 25 TAB at 08:21

## 2023-11-16 RX ADMIN — ATOMOXETINE 40 MG: 40 CAPSULE ORAL at 08:04

## 2023-11-16 RX ADMIN — POLYETHYLENE GLYCOL 3350 17 G: 17 POWDER, FOR SOLUTION ORAL at 14:59

## 2023-11-16 NOTE — DISCHARGE INSTR - OTHER ORDERS
You are being discharged to [address], [address], [address] . Triggers you have identified during your hospitalization that led to your admission include suicidal ideation, and distressed mood. Coping skills you have identified during your hospitalization include stress balls, and talking to someone. If you are unable to deal with your distressed mood alone please contact your ACT team, or your father. If that is not effective and you continue to have suicidal ideation, distressed mood, feeling overwhelmed, and/or are in crisis please contact [facility] at [phone], dial 911 or go to the nearest emergency center. [address] [facility]: [phone] or [phone] - 7 days a week from 6 am to 2 am    A warm line is a phone service for people who are looking for support, engagement, and hope during non-emergency mental health struggles or distress. A warm line is staffed by peers who have personal or lived experience with mental health disorders and who can listen, share, and offer a sense of recovery. A warm line is different from a crisis line or hotline, which are intended for emergency situations    National Suicide Hotline: [address] [facility]    Crisis Text Line: 480482      Javon Gentile, our [address], will be calling you after your discharge, on the phone number that you provided. They will be available as an additional support, if needed. If you wish to speak with one of them, you may contact Hanane Ash at [phone] or Malathi Taylor at [phone].

## 2023-11-16 NOTE — BH TRANSITION RECORD
Contact Information: If you have any questions, concerns, pended studies, tests and/or procedures, or emergencies regarding your inpatient behavioral health visit. Please contact [facility] older adult behavioral health unit 6B [phone] and ask to speak to a , nurse or physician. A contact is available 24 hours/ 7 days a week at this number. Summary of Procedures Performed During your Stay:  Below is a list of major procedures performed during your hospital stay and a summary of results:  - Cardiac Procedures/Studies: ECG- Sinus Rhythm . Pending Studies (From admission, onward)       Start     Ordered    11/17/23 0600  Protime-INR  Once         11/15/23 1033                  Please follow up on the above pending studies with your PCP and/or referring provider.

## 2023-11-16 NOTE — TREATMENT TEAM
Pt attended group. Pt calm and cooperative. Pt hopeful of d.c tomorrow. Pt noted she spoke to her Dad this morning and apologized and can not wait to spend time with him. 11/16/23 1100   Activity/Group Checklist   Group Other (Comment)  (Meditation and mindfulness)   Attendance Attended   Attendance Duration (min) 31-45   Interactions Interacted appropriately   Affect/Mood Appropriate   Goals Achieved Identified feelings; Discussed coping strategies; Discussed self-esteem issues; Able to listen to others; Able to engage in interactions; Able to manage/cope with feelings; Able to reflect/comment on own behavior;Verbalized increased hopefulness; Able to self-disclose

## 2023-11-16 NOTE — PROGRESS NOTES
Progress Note - Marie Mason 47 y.o. female MRN: [id number]  Unit/Bed#: Lorri Grier 020-51 Encounter: [id number]    Patient was seen today for continuation of care, records reviewed and patient was discussed with the morning case review team.    Mert Fontenot was seen today for psychiatric follow-up. On assessment today, Mert Fontenot was pleasant. Excited about discharge tomorrow, Mert Fontenot states that she is looking forward to being home with her cat "Nehemiah". Depression and anxiety reported as overall improved, patient is tolerating recent cross titration to Trileptal well with no adverse effects reported. Sleep also stated as improved, as needed trazodone utilized and effective overnight. We will continue with current plan of care including discharge to home tomorrow with follow-up resources established by case management. Mert Fontenot denies acute suicidal/self-harm ideation/intent/plan upon direct inquiry at this time. Mert Fontenot remains behaviorally appropriate, no agitation or aggression noted on exam or in report. Mert Fontenot also denies HI/AH/VH, and does not appear overtly manic. No overt delusions or paranoia are verbalized. Impulse control is intact. Mert Fontenot remains adherent to her current psychotropic medication regimen and denies any side effects from medications, as well as none noted on exam.    Vitals:  Vitals:    11/16/23 0727   BP: 162/89   Pulse: 88   Resp: 17   Temp: (!) 97.1 °F (36.2 °C)   SpO2: 96%       Laboratory Results:  I have personally reviewed all pertinent laboratory/tests results.   Most Recent Labs:   Lab Results   Component Value Date    WBC 8.87 11/11/2023    RBC 4.64 11/11/2023    HGB 13.9 11/11/2023    HCT 43.4 11/11/2023     11/11/2023    RDW 14.0 11/11/2023    TOTANEUTABS 4.34 10/15/2023    NEUTROABS 5.75 11/11/2023    SODIUM 140 11/11/2023    K 3.8 11/11/2023     11/11/2023    CO2 24 11/11/2023    BUN 18 11/11/2023    CREATININE 0.61 11/11/2023    GLUC 96 11/11/2023    GLUF 96 11/11/2023    CALCIUM 9.0 11/11/2023    AST 13 11/11/2023    ALT 12 11/11/2023    ALKPHOS 48 11/11/2023    TP 6.3 (L) 11/11/2023    ALB 3.6 11/11/2023    TBILI 0.29 11/11/2023    CHOLESTEROL 234 (H) 03/09/2023    HDL 71 03/09/2023    TRIG 153 (H) 03/09/2023    LDLCALC 132 (H) 03/09/2023    NONHDLC 163 03/09/2023    VALPROICTOT 23 (L) 11/10/2023    AMMONIA 63 11/06/2023    FXV8KBZNJBWY 2.445 11/11/2023    FREET4 0.81 11/06/2023    T3FREE 2.27 (L) 10/16/2019    PREGUR negative 07/13/2022    PREGSERUM Negative 11/06/2023    RPR Non-Reactive 07/17/2022    HGBA1C 5.0 01/05/2023    EAG 97 01/05/2023       Psychiatric Review of Systems:  Behavior over the last 24 hours:  unchanged. Sleep: better  Appetite: better  Medication side effects: none  ROS: no complaints, denies shortness of breath or chest pain and all other systems are negative for acute changes    Mental Status Evaluation:  Appearance:  dressed appropriately, adequate grooming   Behavior:  calm   Speech:  normal rate and volume   Mood:  less anxious, less depressed   Affect:  normal range and intensity   Thought Process:  goal directed, linear   Thought Content:  no overt delusions   Perceptual Disturbances: denies auditory hallucinations when asked, does not appear responding to internal stimuli   Risk Potential: Suicidal ideation - None  Homicidal ideation - None  Potential for aggression - No   Memory:  recent and remote memory grossly intact   Sensorium  person and place      Consciousness:  alert and awake   Attention: attention span and concentration are age appropriate   Insight:  improving   Judgment: improving   Gait/Station: normal gait/station   Motor Activity: no abnormal movements   Progress Toward Goals:   [facility] is progressing towards goals of inpatient psychiatric treatment by continued medication compliance and is attending therapeutic modalities on the milieu.  However, the patient continues to require inpatient psychiatric hospitalization for continued medication management and titration to optimize symptom reduction, improve sleep hygiene, and demonstrate adequate self-care. Assessment/Plan   Principal Problem:    Schizoaffective disorder, bipolar type ([address])  Active Problems:    Benign essential hypertension    Edema    Hypothyroidism    Overactive bladder    Sjogren's syndrome (HCC)    COPD (chronic obstructive pulmonary disease) ([facility])    Medical clearance for psychiatric admission    History of pulmonary embolism    Grief      Recommended Treatment: Treatment plan and medication changes discussed and per the attending physician the plan is: 1. Continue with group therapy, milieu therapy and occupational therapy  2. Behavioral Health checks every 7 minutes  3. Continue frequent safety checks and vitals per unit protocol  4. Continue with SLIM medical management as indicated  5. Continue with current medication regimen  6. Will review labs in the a.m. 7.Disposition Planning: Discharge planning and efforts remain ongoing    Behavioral Health Medications: all current active meds have been reviewed and continue current psychiatric medications.   Current Facility-Administered Medications   Medication Dose Route Frequency Provider Last Rate    albuterol  2 puff Inhalation Q4H PRN Kaylee Duarte PA-C      atoMOXetine  40 mg Oral Daily Marycruz Prajapati DO      buPROPion  300 mg Oral Daily JHONATAN Mckeon      cefpodoxime  400 mg Oral BID With Meals JHONATAN Stern      cholecalciferol  1,000 Units Oral Daily JHONATAN Mckeon      cloNIDine  0.1 mg Oral Q12H [address] [place], [place] [address]JHONATAN      furosemide  20 mg Oral Daily Zena Parra      [START ON 11/22/2023]  haloperidol decanoate  50 mg Intramuscular - haloperidol decanoate Q28 Days Chyrel Castleman, MD      hydrOXYzine HCL  25 mg Oral Q6H PRN Max 4/day JHONATAN Mckeon      hydrOXYzine HCL  50 mg Oral Q6H PRN Max 4/day JHONATAN Mckeon      ibuprofen  200 mg Oral Q6H PRN Tami Dale, JHONATAN      ibuprofen  400 mg Oral Q6H PRN Mayalberto Dale, JHONATAN      ibuprofen  600 mg Oral Q8H PRN Mayalberto Dale, JHONATAN      levothyroxine  125 mcg Oral Early Morning Kya Chung PA-C      LORazepam  1 mg Intramuscular Q6H PRN Max 3/day Mayalberto Dale, JHONATAN      LORazepam  1 mg Oral Q6H PRN Max 3/day Tami Dale, JHONATAN      losartan  50 mg Oral Daily Kya ERICKSON Chung      lurasidone  60 mg Oral Daily With Breakfast Mayalberto Dale, JHONATAN      melatonin  3 mg Oral HS Livia Buitrago MD      naltrexone  50 mg Oral Daily Mayalberto Dale, JHONATAN      nystatin   Topical BID Kya Chung PA-C      OLANZapine  5 mg Intramuscular Q3H PRN Max 3/day Mayalberto Dale, JHONATAN      OLANZapine  2.5 mg Oral Q4H PRN Max 6/day Mayalberto Dale, JHONATAN      OLANZapine  5 mg Oral Q4H PRN Max 3/day Mayablerto Dale, JHONATAN      OLANZapine  5 mg Oral Q3H PRN Max 3/day Mayalberto Dale, JHONATAN      OXcarbazepine  300 mg Oral Q12H Noreen Velarde MD      polyethylene glycol  17 g Oral Daily PRN Mayalberto Dale, JHONATAN      senna-docusate sodium  1 tablet Oral Daily PRN MayJHONATAN Pierce      topiramate  200 mg Oral BID Mayalberto Dale, JHONATAN      traZODone  100 mg Oral HS PRN Livia Buitrago MD      warfarin  6 mg Oral Daily (warfarin) Susie Huerta PA-C         Risks / Benefits of Treatment:  Risks, benefits, and possible side effects of medications explained to patient and patient verbalizes understanding and agreement for treatment. Counseling / Coordination of Care:  Patient's progress reviewed with nursing staff. Medications, treatment progress and treatment plan reviewed with patient. Supportive counseling provided to the patient. Total floor/unit time spent today 25 minutes. Greater than 50% of total time was spent with the patient and / or family counseling and / or coordination of care. A description of the counseling / coordination of care: medication education, treatment plan, supportive therapy.     This note was completed in part utilizing Dragon dictation Software. Grammatical, translation, syntax errors, random word insertions, spelling mistakes, and incomplete sentences may be an occasional consequence of this system secondary to software limitations with voice recognition, ambient noise, and hardware issues.  If you have any questions or concerns about the content, text, or information contained within the body of this dictation, please contact the provider for clarification

## 2023-11-16 NOTE — PROGRESS NOTES
11/16/23 0726   Team Meeting   Meeting Type Daily Rounds   Initial Conference Date 11/16/23   Next Conference Date 11/17/23   Team Members Present   Team Members Present Physician;Nurse;;; Occupational Therapist   Physician Team Member Dr Lakesha Gold, Dr Joby Lopez Team Member [facility] Management Team Member [address] [place] [address] Work Team Member Claudia   OT Team Member Grisel Harris   Patient/Family Present   Patient Present No   Patient's Family Present No     Discharge tomorrow at 6 am. Work note dated 2 weeks out.

## 2023-11-16 NOTE — CASE MANAGEMENT
Case Management Discharge Planning Note    Patient name Jonathan Notice  Location [facility] 040-30 MRN [id number]  : 1970 Date 2023       Current Admission Date: 11/10/2023  Current Admission Diagnosis:Schizoaffective disorder, bipolar type [facility])   Patient Active Problem List    Diagnosis Date Noted    Grief 2023    Toxic encephalopathy 2023    Candida infection of flexural skin 2023    Lymphedema 10/30/2023    Acute saddle pulmonary embolism ([address]) 10/16/2023    Acute deep vein thrombosis of lower leg, left ([address]) 10/16/2023    Polysubstance abuse ([address]) 2023    Restless leg syndrome 2023    Intentional overdose ([address]) 2023    Tremors of nervous system 2023    Contact dermatitis and eczema 09/15/2023    History of pulmonary embolism 2023    Elevated troponin 2023    Acute respiratory failure ([address]) 2023    Cervicalgia 08/10/2023    Medial epicondylitis of right elbow 2023    Bilateral leg edema 2023    Chronic migraine without aura without status migrainosus, not intractable 2023    Chronic eczematous otitis externa of both ears 2023    Intentional self-harm by unspecified sharp object, sequela ([address]) 2023    Suicidal deliberate poisoning ([address]) 2022    Knee pain, right 2022    Platelets decreased ([address]) 2022    GERD (gastroesophageal reflux disease) 2022    Diarrhea 2022    Ecchymosis 2022    Anxiety 2022    Borderline personality disorder ([address]) 2022    Contusion of elbow 2021    Cognitive impairment 10/07/2021    Substance abuse ([address]) 2021    Potential for cognitive impairment 2021    Mood disorder ([address]) 2021    Chronic tension-type headache, intractable 03/10/2021    History of COVID-19 2020    Memory difficulties 2020    BMI 45.0-49.9, adult ([address]) 2020    Mixed hyperlipidemia 10/30/2019    COPD (chronic obstructive pulmonary disease) ([address]) 06/12/2019    Major depressive disorder 05/23/2019    Sjogren's syndrome ([address]) 05/23/2019    Primary osteoarthritis of both knees 08/08/2018    MAG (obstructive sleep apnea) 04/24/2018    Medial epicondylitis of elbow, left 04/03/2018    Overactive bladder 06/08/2017    Schizoaffective disorder, bipolar type ([address]) 03/17/2017    Hypothyroidism 03/17/2017    Restrictive lung disease 02/01/2017    Family history of renal cancer 04/26/2016    Microhematuria 12/23/2015    Yan's esophagus determined by biopsy 10/19/2015    Medical clearance for psychiatric admission 09/14/2015    DDD (degenerative disc disease), lumbar 04/09/2015    Spondylosis of lumbosacral joint without myelopathy 04/09/2015    Urinary incontinence 03/31/2015    Benign essential hypertension 07/02/2014    Edema 03/26/2014    Chronic low back pain 04/07/2012    Hypertension 10/25/2008      LOS (days): 6  Geometric Mean LOS (GMLOS) (days):   Days to GMLOS:     OBJECTIVE:  Risk of Unplanned Readmission Score: 59.02         Current admission status: Inpatient Psych   Preferred Pharmacy:   [facility], [address] [address]  Phone: [phone] Fax: [phone]    Primary Care Provider: JHONATAN Arevalo    Primary Insurance: MEDICARE  Secondary Insurance:  [address] [place]    DISCHARGE DETAILS:    Discharge planning discussed with[de-identified] Patient and [facility] team  Freedom of Choice: Yes                   Contacts  Patient Contacts: Jaswinder Morrow (father)  Relationship to Patient[de-identified] Family  Contact Method: Phone  Phone Number: [phone]  Reason/Outcome: Continuity of Care, Emergency Contact, Discharge Planning              Other Referral/Resources/Interventions Provided:  Referrals Provided[de-identified] Crisis Hotline    Would you like to participate in our [facility] service program?  : No - Declined          Transport at Discharge : Automobile Accompanied by: Family member     IMM Given (Date):: 11/16/23  IMM Given to[de-identified] Patient          CM met with PT for PT check in. PT was pleasant in conversation, reviewed discharge plan along with follow up care and supports, PT in agreement with all. PT denies si/hi/ah/vh depression. Does report some anxiety related to discharge. PT expressed gratitude. Reviewed IMM, PT declined right to appeal, feels she is ready for discharge and signed.      Aftercare:   ACT - 11/20/23 patient will go to ACT to meet with team, 11/22/23 she will get her [facility] - 11/27/23 @ 1:20 pm with Clinton Vasquez Breath sounds clear and equal bilaterally.

## 2023-11-16 NOTE — NURSING NOTE
Pt pleasant and med-compliant with good appetite and steady gait. VSS. Denied SI. Monitored for safety and support.

## 2023-11-16 NOTE — PROGRESS NOTES
11/16/23 1457   Discharge [address] Parent;Friend; Family members   Type of Current Residence Private residence   [address] No   Other Referral/Resources/Interventions Provided:   Referrals Provided: Crisis Hotline   Discharge Communications   Discharge planning discussed with: Patient and [facility] team   Freedom of Choice Yes   IMM Given (Date): 11/16/23   IMM Given to: Patient   Transportation at Discharge? No   Transport at Discharge  [address] by Family member   Contacts   Patient Contacts Daniela Eye (father)   Relationship to Patient: Family   Contact Method Phone   Phone Number [phone]   Reason/Outcome Continuity of Care;Emergency Contact; Discharge Planning   [facility]   Would you like to participate in our [id number] service program?   No - Declined

## 2023-11-17 ENCOUNTER — PATIENT OUTREACH (OUTPATIENT)
Dept: CASE MANAGEMENT | Facility: HOSPITAL | Age: 53
End: 2023-11-17

## 2023-11-17 ENCOUNTER — TELEPHONE (OUTPATIENT)
Dept: PSYCHIATRY | Facility: CLINIC | Age: 53
End: 2023-11-17

## 2023-11-17 VITALS
WEIGHT: 293 LBS | HEART RATE: 80 BPM | RESPIRATION RATE: 18 BRPM | OXYGEN SATURATION: 96 % | DIASTOLIC BLOOD PRESSURE: 77 MMHG | SYSTOLIC BLOOD PRESSURE: 129 MMHG | TEMPERATURE: 97.1 F | HEIGHT: 66 IN | BODY MASS INDEX: 47.09 KG/M2

## 2023-11-17 LAB
INR PPP: 2.45 (ref 0.84–1.19)
PROTHROMBIN TIME: 26.9 SECONDS (ref 11.6–14.5)

## 2023-11-17 PROCEDURE — 99238 HOSP IP/OBS DSCHRG MGMT 30/<: CPT | Performed by: STUDENT IN AN ORGANIZED HEALTH CARE EDUCATION/TRAINING PROGRAM

## 2023-11-17 PROCEDURE — 85610 PROTHROMBIN TIME: CPT | Performed by: NURSE PRACTITIONER

## 2023-11-17 PROCEDURE — 99232 SBSQ HOSP IP/OBS MODERATE 35: CPT | Performed by: STUDENT IN AN ORGANIZED HEALTH CARE EDUCATION/TRAINING PROGRAM

## 2023-11-17 RX ORDER — WARFARIN SODIUM 5 MG/1
5 TABLET ORAL
Qty: 30 TABLET | Refills: 0 | Status: SHIPPED | OUTPATIENT
Start: 2023-11-17 | End: 2023-11-30 | Stop reason: SDUPTHER

## 2023-11-17 RX ORDER — LOSARTAN POTASSIUM 100 MG/1
100 TABLET ORAL DAILY
Qty: 30 TABLET | Refills: 1 | Status: ON HOLD | OUTPATIENT
Start: 2023-11-17 | End: 2024-01-16

## 2023-11-17 RX ORDER — ATOMOXETINE 40 MG/1
40 CAPSULE ORAL DAILY
Qty: 30 CAPSULE | Refills: 1 | Status: ON HOLD | OUTPATIENT
Start: 2023-11-17 | End: 2024-01-16

## 2023-11-17 RX ADMIN — LURASIDONE HYDROCHLORIDE 60 MG: 40 TABLET, COATED ORAL at 09:15

## 2023-11-17 RX ADMIN — CLONIDINE HYDROCHLORIDE 0.1 MG: 0.1 TABLET ORAL at 08:36

## 2023-11-17 RX ADMIN — FUROSEMIDE 20 MG: 20 TABLET ORAL at 08:36

## 2023-11-17 RX ADMIN — LEVOTHYROXINE SODIUM 125 MCG: 125 TABLET ORAL at 06:25

## 2023-11-17 RX ADMIN — TOPIRAMATE 200 MG: 100 TABLET, FILM COATED ORAL at 08:37

## 2023-11-17 RX ADMIN — CHOLECALCIFEROL TAB 25 MCG (1000 UNIT) 1000 UNITS: 25 TAB at 08:37

## 2023-11-17 RX ADMIN — BUPROPION HYDROCHLORIDE 300 MG: 300 TABLET, EXTENDED RELEASE ORAL at 08:36

## 2023-11-17 RX ADMIN — CEFPODOXIME PROXETIL 400 MG: 200 TABLET, FILM COATED ORAL at 08:35

## 2023-11-17 RX ADMIN — LOSARTAN POTASSIUM 100 MG: 50 TABLET, FILM COATED ORAL at 08:37

## 2023-11-17 RX ADMIN — ATOMOXETINE 40 MG: 40 CAPSULE ORAL at 08:36

## 2023-11-17 RX ADMIN — OXCARBAZEPINE 300 MG: 300 TABLET, FILM COATED ORAL at 08:37

## 2023-11-17 RX ADMIN — NALTREXONE HYDROCHLORIDE 50 MG: 50 TABLET, FILM COATED ORAL at 08:43

## 2023-11-17 NOTE — DISCHARGE INSTR - AVS FIRST PAGE
Take coumadin 5 mg daily. Check INR within 1 week.  Follow-up with PCP for INR check and Coumadin dosing

## 2023-11-17 NOTE — PROGRESS NOTES
11/17/23 0730   Team Meeting   Meeting Type Daily Rounds   Initial Conference Date 11/17/23   Next Conference Date 11/20/23   Team Members Present   Team Members Present Physician;Nurse;;; Occupational Therapist   Physician Team Member Dr Arely Hagen, Dr Lauri Sinha, Dr. Aleena Dangelo Team Member [facility] Management Team Member [facility] Work Team Member Claudia TALBOT Team Member Juaquin Brink   Patient/Family Present   Patient Present No   Patient's Family Present No     Discharge today at 11 am, will resume services with [facility].

## 2023-11-17 NOTE — PROGRESS NOTES
Outpatient Care Management Note: ADT alert received pt discharged from [facility]. Follow up call placed to Jose Antonio Shankar. Left detailed message on  explaining I am covering CM for Duy Murphy RN CM on care team. Included call back contact information. Jose Antonio Shankar returned this CM's call. She states that she is feeling much better. States that there were some med changes during inpatient stay. Depakote discontinued due to worsening tremors. Jose Antonio Shankar feels she still has some tremors but they are getting better. They also increased her dose of latuda. Jose Antonio Shankar reports that she will be going to the pharmacy when she is off the phone with this CM to  prescriptions at pharmacy. She normally gets them bubble packed starting on Saturday. Called pharmacy and confirmed they will give her medication doses for tonight as well. Per chart pharmacist called NP to confirm medications. Confirmed upcoming appointment for TCM on 11/27/23. She states she will write this on her calendar. Also confirmed she has appointment schedule with her therapist on Monday and psychiatrist on Wednesday. We also spoke about obtaining blood work for coumadin dosing. She will get blood work done and follow up at PCP appointment. Jose Antonio Shankar shares she will be inquiring about getting a tattoo to honor her mother who passed 2 years ago. Jose Antonio Shankar understands that she is on blood thinners and this may not be possible. Jose Antonio Shankar also shares Thanksgiving plans of getting together with her brother and father this year. Therapeutic listening provided. We spoke about getting back on track with health goals now that she is discharged. Plan for follow up call with Dorthea Gowers in couple weeks time. Pt states she will try to get back on track in her routine. She will be returning to work in 2 weeks. She is appreciative of the call.      Transitions:   Type: Unplanned  Provider notification within 2 days of discharge  PCP: Amrit Morgan   Notified via: ADT alert  Specialty Provider: [facility] Notified via: IP CM faxed discharge summary   Other Care Team Member: RAIN Vasquez   Notified via: ADT alert  Collaboration with discharge team: reviewed HP IP/OP [facility] and reviewed Case Management notes  Communication of changes to care plans to patient/caregiver: verbally reviewed with patient/caregiver       Care Planning:   Care Plan Evaluation   Referrals or Resources: no new referrals    Referral/Resource Follow-up: CM need to follow-up with patient/caregiver at a future outreach due to recent discharge   Planning for continuity of care: follow up with [place] RN    Collaborative approaches: note routed to RAIN HERNANDEZ   Patient/Caregiver desired level of involvement: Poncho Polanco remains actively involved with her care.     Barrier Analysis: No barriers identified   Follow-up Schedule: Medium  Communication of self-management plans to patients: Verbally reviewed with patient/caregiver

## 2023-11-17 NOTE — QUICK NOTE
Notified by nursing regarding patient's discharge today and need for Coumadin dosing. Currently, patient is taking Coumadin 6 mg daily. Her INR has increased nicely and is therapeutic. Will recommend patient take Coumadin 5 mg daily on discharge, check INR within 1 week, and follow-up with PCP for INR check and Coumadin dosing.

## 2023-11-17 NOTE — NURSING NOTE
Pt pleasant and med-compliant with good appetite and steady gait. Pt expressed understanding of d/c meds and f/u instructions. Pt left unit with all her belongings and escorted by staff to lobby to meet her father for transport home at . Monitored for safety and support.

## 2023-11-17 NOTE — PROGRESS NOTES
Discharge Summary - [address] 47 y.o. female MRN: [id number]  Unit/Bed#: [facility] Crandall 835-02 Encounter: [id number]     Admission Date:   Admission Orders (From admission, onward)       Ordered        11/10/23 2045  ED TO DIFFERENT CAMPUS [facility] UNIT or INPATIENT MEDICAL UNIT to [facility] UNIT (using Discharge Readmit Navigator) - Admit Patient to [facility]  Once                                Discharge Date: 11/17/23     Attending Psychiatrist: Pippa Mishra MD     Admission Diagnosis:    Principal Problem:    Schizoaffective disorder, bipolar type ([facility])  Active Problems:    Benign essential hypertension    Edema    Hypothyroidism    Overactive bladder    Sjogren's syndrome (HCC)    COPD (chronic obstructive pulmonary disease) ([facility])    Medical clearance for psychiatric admission    History of pulmonary embolism    Grief      Discharge Diagnosis:     Principal Problem:    Schizoaffective disorder, bipolar type ([facility])  Active Problems:    Benign essential hypertension    Edema    Hypothyroidism    Overactive bladder    Sjogren's syndrome (HCC)    COPD (chronic obstructive pulmonary disease) ([facility])    Medical clearance for psychiatric admission    History of pulmonary embolism    Grief  Resolved Problems:    * No resolved hospital problems. *      Reason for Admission/HPI:     As per Dr. Mirian Joshi:     Brittany Obando is a 47 y.o. female with a history of Schizoaffective Disorder and personality disorder who was admitted to the inpatient older adult psychiatric unit on a involuntary 303 commitment basis due to depression and suicide attempt by overdose on 40 tablets of Flexaril . Tylene Galeazzi overdosed on 11/06 and was admitted to [facility] for evaluation. She was most recently hospitalized in [facility] older adult [address] from 09/29 to 10/06. She was seen by Dr. Matteo Mcdonald for consultation at [facility] and followed during her admission.  Per his last note before she was discharged from [facility] medical floor: "I came to see the patient for continuity of care. Per staff patient attempted to elope again yesterday. Patient initially was not verbally answering questions, her eyes were downcast.  She reports being upset regarding being hospitalized. She was tearful during the evaluation. She verbalized apathetic statements such as "fuck it". She was perseverative regarding returning home and did not appear to acknowledge the seriousness of her overdose. She reports adequate sleep and appetite. She denies current suicidal or homicidal ideation, plan, or intent. When assessing for hallucinations patient stated that "the other night" she felt that she was "talking a lot when falling asleep". She denies auditory or visual hallucinations. As interview progressed patient became somewhat more engaged and cooperative. When asked about her home Depakote regimen patient states that she does not remember her dosages. States that she was taking "1 to 2" tablets of Depakote at night and does not remember how much she was taking in the morning. Reports that she had been experiencing tremors due to the Depakote and states that the plan by her psychiatrist was to taper off this medication and onto a different mood stabilizer. She reports having experienced tremors in her hands and legs and states that she has been experiencing those tremors today. States that she does not want to take Depakote anymore. She reports feeling depressed. She reports that she is interested in medication change to improve her condition."   Addendum at 1:04 pm 11/10: "Received [employer] message from primary team stating that patient tried to hurt herself and was banging her arms on the wall, control team was called and patient was given Ativan prn. We discussed need for improved mood stabilization and treatment of agitation.   Due to this recent episode of agitation, impulsivity, and unstable mood discussed recommendation to start oxcarbazepine 150 mg BID for mood stabilization and to order prn medications for agitation."  Patient was transferred to this unit on latuda 60mg daily, strattera 40mg daily, wellbutrin Xl 300mg daily, topamax 200mg BID and trileptal 150mg BID. On evaluation in the inpatient psychiatric unit Jameson Colin states she is feeling "okay" and is hoping for discharge soon. States that she regrets her suicide attempt. States that she does not feel as depressed, and admits that she had been feeling depressed and more anhedonic before her suicide attempt. Admits that "it was a mistake ". States she has been feeling more depressed over the past 2 years after her mother . She states that her mother was on hospice, and she was providing support for her before she passed. States that she has been hospitalized several times since then, most recently about a month ago. Admits that she does want to get better, is hopeful that this medication change will help. States that she is already noted that she is coloring more and feels more motivated. States that she wants to go back to work, and wants to go home and see her pet cat. States that she also has to be there for her father. She denies any side effects from her medication changed to Trileptal, and is hopeful this will help reduce her tremors. She states she would like to restart her Strattera, she states that this is helping her following her relapse on methamphetamines. 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 She denies current thoughts to herself or anyone else, denies any hallucinations. She states that she does want to see how she responds to treatment, and is going to continue coloring here on the unit to help relax.     Past Medical History:   Diagnosis Date    Anxiety     Arthritis     oa cassandra knees    Asthma     good control- no medications    Yan's esophagus     Bipolar affective disorder ([address])     Cannabis abuse with unspecified 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HCC)     Chronic pain disorder     lumbar    COPD (chronic obstructive pulmonary disease) (HCC)     CPAP (continuous positive airway pressure) dependence     Degenerative disc disease at L5-S1 level     Deliberate self-cutting     9/15/22per pt has not done recently-- does see a therapist and psychiatrist regularly    Depression 09/16/2008    Disease of thyroid gland     hypo    MARTINEZ (dyspnea on exertion)     per pt "has had this with exertion and not new"    Drug overdose 10/28/2008    suicide attempt and again drug overdose 7/2022--[facility] floor and than transferred to [facility]    Dysphagia     Dyspnea     Edema     BLE    Family history of blood clots     GERD (gastroesophageal reflux disease)     Headache(784.0)     Headache, tension-type     Hemorrhage of rectum and anus 10/02/2017    Formatting of this note might be different from the original.  Added automatically from request for surgery [id number]    High blood pressure     History of COVID-19 12/30/2020    Symptoms started on 12/30/2020 and then got worse. Today she is feeling a little bit better. She is a candidate for monoclonal antibody infusion and set for 1/6/21 @ 1pm at [facility]. 01/07/21 - telemedicine -     Hyperlipidemia     Hypertension     Knee pain, bilateral     Especially right    Medical marijuana use     Memory loss     Migraines     Obesity     Overactive bladder     Pulmonary emboli (HCC)     Sjogren's disease ([address])     Sleep apnea     Stress incontinence     Suicidal ideations     Teeth missing     Tremor     per pt Tremors of Right Leg and both Arms    Visual impairment     Wears glasses      Past Surgical History:   Procedure Laterality Date    BREAST CYST EXCISION Right 1989    CARPAL TUNNEL RELEASE Left     CHOLECYSTECTOMY  05/2003    Laparoscopic    COLONOSCOPY      01/12/2009    DILATION AND CURETTAGE OF UTERUS      ELBOW SURGERY Left     x2.  No hardware ESOPHAGOGASTRODUODENOSCOPY      FOOT SURGERY Left     Plantar fasciotomy    HERNIA REPAIR      HYSTERECTOMY      laporoscopic, partial    KNEE ARTHROSCOPY Bilateral     OOPHORECTOMY Left 10/2015    TN GASTROCNEMIUS RECESSION Left 02/24/2021    Procedure: RECESSION GASTROC OPEN;  Surgeon: Lane Mead DPM;  Location: [address] [place] [address] MAIN OR;  Service: Podiatry    TN RPR UMBILICAL HRNA 5 YRS/> REDUCIBLE N/A 04/24/2019    Procedure: REPAIR HERNIA UMBILICAL LAPAROSCOPIC W/ ROBOTICS;  Surgeon: Shanti Uriarte MD;  Location: [facility] Main OR;  Service: General    TN SPHINCTEROTOMY ANAL DIVISION SPHINCTER [facility] N/A 08/31/2018    Procedure: EUA, LEFT PARTIAL INTERNAL SPHINCTEROTOMY;  Surgeon: Angel Luis Fong MD;  Location: [address] [place] [address] MAIN OR;  Service: Colorectal    TN TNOT ELBOW LATERAL/MEDIAL DEBRIDE OPEN TDN RPR Left 09/20/2022    Procedure: RELEASE EPICONDYLAR ELBOW MEDIAL;  Surgeon: Ceci Boland MD;  Location: [facility] MAIN OR;  Service: Orthopedics    REDUCTION MAMMAPLASTY Bilateral 1999    REPAIR LACERATION Left     left hand-5/18/2009    REPLACEMENT TOTAL KNEE Right     ROTATOR CUFF REPAIR Left     TONSILECTOMY AND ADNOIDECTOMY      US GUIDED MSK PROCEDURE  04/22/2021    VASCULAR SURGERY      VEIN LIGATION Bilateral     WISDOM TOOTH EXTRACTION         Medications: All current active medications have been reviewed. Medications prior to admission:    Prior to Admission Medications   Prescriptions Last Dose Informant Patient Reported? Taking?    Diclofenac Sodium (VOLTAREN) 1 % Not Taking Self No No   Sig: Apply 2 g topically 4 (four) times a day   Patient not taking: Reported on 10/24/2023   RABEprazole (ACIPHEX) 20 MG tablet Past Week Self Yes Yes   Sig: Take 20 mg by mouth 2 (two) times a day   albuterol (Ventolin HFA) 90 mcg/act inhaler Past Month Self No Yes   Sig: Inhale 2 puffs every 6 (six) hours as needed for wheezing   amLODIPine (NORVASC) 5 mg tablet 11/10/2023 Self No Yes   Sig: TAKE 1 TABLET (5 MG TOTAL) BY MOUTH DAILY atoMOXetine (STRATTERA) 40 mg capsule 11/10/2023 Self Yes Yes   buPROPion (WELLBUTRIN XL) 300 mg 24 hr tablet 11/10/2023 Self No Yes   Sig: Take 1 tablet (300 mg total) by mouth daily   cholecalciferol (VITAMIN D3) 1,000 units tablet 11/10/2023 Self No Yes   Sig: Take 1 tablet (1,000 Units total) by mouth daily   cloNIDine (CATAPRES) 0.1 mg tablet 11/10/2023 Self No Yes   Sig: Take 1 tablet (0.1 mg total) by mouth every 12 (twelve) hours   divalproex sodium (DEPAKOTE) 250 mg DR tablet 11/10/2023 Self No Yes   Sig: Take 3 tablets (750 mg total) by mouth in the morning   divalproex sodium (DEPAKOTE) 500 mg DR tablet Past Week Self No Yes   Sig: Take 2 tablets (1,000 mg total) by mouth daily at bedtime   estradiol (ESTRACE) 0.5 MG tablet Not Taking Self No No   Sig: Take 1 tablet (0.5 mg total) by mouth daily   Patient not taking: Reported on 11/10/2023   furosemide (LASIX) 40 mg tablet 11/10/2023  No Yes   Sig: Take 1 tablet (40 mg total) by mouth daily   haloperidol decanoate (Haldol Decanoate) 50 mg/mL injection Past Week Self Yes Yes   Sig: every 30 (thirty) days   hydrocortisone 2.5 % ointment  Self No No   Sig: Apply topically 2 (two) times a day   Patient not taking: Reported on 11/3/2023   levothyroxine (Euthyrox) 125 mcg tablet 11/10/2023 Self No Yes   Sig: Take 1 tablet (125 mcg total) by mouth daily in the early morning   lidocaine (LIDODERM) 5 % Not Taking Self No No   Sig: Apply 1 patch topically over 12 hours daily Remove & Discard patch within 12 hours or as directed by MD   Patient not taking: Reported on 11/10/2023   losartan (COZAAR) 50 mg tablet 11/10/2023 Self No Yes   Sig: TAKE ONE TABLET BY MOUTH DAILY   lurasidone (LATUDA) 40 mg tablet 11/10/2023 Self No Yes   Sig: Take 1 tablet (40 mg total) by mouth daily with breakfast   lurasidone (LATUDA) 40 mg tablet 11/10/2023 Self No Yes   Sig: Take 1 tablet (40 mg total) by mouth daily with dinner   memantine (NAMENDA) 10 mg tablet  Self No No   Sig: Take 1 tablet (10 mg total) by mouth daily   metoprolol tartrate (LOPRESSOR) 25 mg tablet 11/10/2023  No Yes   Sig: TAKE ONE TABLET BY MOUTH EVERY 12 HOURS   naltrexone (REVIA) 50 mg tablet 11/10/2023 Self No Yes   Sig: Take 1 tablet (50 mg total) by mouth daily   nystatin (MYCOSTATIN) powder 11/10/2023  No Yes   Sig: Apply topically 4 (four) times a day for 5 days   oxybutynin (DITROPAN) 5 mg tablet  Self No No   Sig: Take 1 tablet (5 mg total) by mouth 2 (two) times a day   pantoprazole (PROTONIX) 20 mg tablet 11/10/2023 Self No Yes   Sig: Take 1 tablet (20 mg total) by mouth daily in the early morning   pilocarpine (SALAGEN) 5 mg tablet Past Week Self Yes Yes   Sig: Take 5 mg by mouth 2 (two) times a day   rOPINIRole (REQUIP) 1 mg tablet Past Week Self Yes Yes   Si mg   topiramate (TOPAMAX) 200 MG tablet 11/10/2023 Self No Yes   Sig: Take 1 tablet (200 mg total) by mouth 2 (two) times a day   trospium chloride (SANCTURA) 20 mg tablet Past Week Self Yes Yes   Sig: Take 20 mg by mouth 2 (two) times a day   warfarin (COUMADIN) 4 mg tablet 2023 Self No Yes   Sig: Take 1 tablet (4 mg total) by mouth daily   warfarin (Coumadin) 2 mg tablet   No No   Sig: Take as directed      Facility-Administered Medications: None       Allergies: Allergies   Allergen Reactions    Percocet [Oxycodone-Acetaminophen] Headache     Severe headaches   (denies issues with Tylenol)    Povidone Iodine Rash     Unsure if betadine or gauze post surgical. Got rash on leg. Has  Had itchy rashes after every surgery prep and IV insertion    Medical Tape Hives    Chlorhexidine Rash     Per pt "able to use the liquid soap--thinkd reaction from the sponges or wipes"       Please refer to the initial H&P for full details.       Vital signs in last 24 hours:    Temp:  [97 °F (36.1 °C)-97.8 °F (36.6 °C)] 97.1 °F (36.2 °C)  HR:  [80-87] 80  Resp:  [16-18] (P) 18  BP: (129-167)/(77-97) 129/77      Intake/Output Summary (Last 24 hours) at 11/17/2023 1031  Last data filed at 11/17/2023 0837  Gross per 24 hour   Intake 1420 ml   Output --   Net 1420 ml         Hospital Course: On admission, Shantelle Mccarthy was admitted to the inpatient psychiatric unit and started on Behavioral Health checks every 7 minutes. During the hospitalization she was encouraged to attend individual therapy, group therapy, milieu therapy and occupational therapy. Upon admission she was seen by medical service for medical clearance for inpatient treatment and medical follow up. Admitted on November 11, 2023 secondary to increase in depression as well as suicide attempt by overdose of Flexeril. Patient transferred from [facility] after medical stabilization. Home dose of Depakote transition to Trileptal on medical unit. Home medications as well as PDMP reviewed. Patient continued on Latuda 60 mg daily for mood stabilization, Strattera 40 mg daily for ADHD, Wellbutrin  mg daily for depressive symptoms, Topamax 200 mg twice daily for mood stabilization and history of seizures, clonidine 0.1 mg twice daily for ADHD/impulsivity and Trileptal 150 mg twice daily for mood. Patient also noted to have monthly dose of Haldol DEC on 10/25/2023, with next dose to be received on 11/22/2023. Patient condition continued to improve, with Shantelle cMcarthy noted to be participating well on the unit including attending groups. Discharged on 2 antipsychotics secondary to multiple failures of monotherapy, patient does not exhibit nor express adverse effects. In agreement with discharge today, patient continues to express positive outlook. Plan if feeling unsafe discussed with patient in agreement to reach out to ACT team, crisis hotline or established peer group. Protective factors also stated as her family and her cat. Shantelle Mccarthy expresses no thoughts of SI/HI. No access to guns. No AVH.   We will continue with current plan of care including discharge today, with follow-up with ACT team.     [facility] [place] medications were titrated as appropriate until discharge, including:    Strattera 40 mg daily for ADHD  Wellbutrin 300 mg daily for depressive symptoms  Clonidine 0.1 mg twice daily for ADHD and impulsivity  Haldol DEC 50 mg q. 28 days for schizoaffective disorder; next dose to be administered on 11/22/2023  Latuda 60 mg daily for mood adjunct and schizoaffective disorder  Melatonin 3 mg nightly for insomnia   naltrexone 50 mg daily for impulsivity   Trileptal 300 mg twice dailyfor mood stabilization  Topamax 200 mg twice daily for mood instability and history of seizures    Prior to beginning of treatment medications risks and benefits and possible side effects including risk of liver impairment and agranulocytosis related to treatment with Tegretol, risk of hyponatremia related to treatment with Trileptal, risk of parkinsonian symptoms, Tardive Dyskinesia and metabolic syndrome related to treatment with antipsychotic medications, risk of cardiovascular events in elderly related to treatment with antipsychotic medications, and risk of suicidality and serotonin syndrome related to treatment with antidepressants were reviewed with Melissa Espinoza. Melissa Espinoza verbalized understanding and agreement for treatment. Blanca tolerated these medications with no acute side effects. The patient's mood brightened over the course of treatment, and she was seen in [facility] interacting appropriately with peers. Melissa Espinoza did not demonstrate dangerous behavior to self or others during her inpatient stay. On the day of discharge, she denied suicidal ideation, intent or plan at the time of discharge and denied homicidal ideation, intent or plan at the time of discharge. she was participating appropriately in milieu at the time of discharge. Behavior was appropriate on the unit at the time of discharge. Sleep and appetite were improved.       Since she was doing well at the end of the hospitalization, treatment team felt that she could be safely discharged to outpatient care. The outpatient follow up with [facility] was arranged by the unit  upon discharge. I reviewed with Barrett Nogueira the importance of compliance with medications and outpatient treatment after discharge., I discussed the medication regimen and possible side effects of the medications with Barrett Nogueira prior to discharge. At the time of discharge she was tolerating psychiatric medications. , I discussed outpatient follow up with Barrett Nogueira., I reviewed with Barrett Nogueira crisis plan and safety plan upon discharge., Barrett Nogueira was competent to understand risks and benefits of withholding information and risks and benefits of her actions. , and Barrett Nogueira agreed to abstain from drug and alcohol use after discharge. The patient understands the importance of taking their medications and attending their outpatient appointments. The patient knows that if there are concerns for safety to utilize their coping skills and can call the suicide hotline as well as 911 if there are concerns for safety. Behavioral Health Medications: all current active meds have been reviewed and continue current psychiatric medications. Discharge on Two Antipsychotic Medications: Yes - Barrett Nogueira is being discharged on 2 antipsychotic agents (Latuda and Haldol Decanoate) due to the history of at least 3 antipsychotic medication trials and failure of multiple trials of monotherapy. Labs/Imaging:   I have personally reviewed all pertinent laboratory/tests results.   Most Recent Labs:   Lab Results   Component Value Date    WBC 8.87 11/11/2023    RBC 4.64 11/11/2023    HGB 13.9 11/11/2023    HCT 43.4 11/11/2023     11/11/2023    RDW 14.0 11/11/2023    TOTANEUTABS 4.34 10/15/2023    NEUTROABS 5.75 11/11/2023    SODIUM 140 11/11/2023    K 3.8 11/11/2023     11/11/2023    CO2 24 11/11/2023    BUN 18 11/11/2023    CREATININE 0.61 11/11/2023    GLUC 96 11/11/2023    GLUF 96 11/11/2023    CALCIUM 9.0 11/11/2023    AST 13 11/11/2023 ALT 12 11/11/2023    ALKPHOS 48 11/11/2023    TP 6.3 (L) 11/11/2023    ALB 3.6 11/11/2023    TBILI 0.29 11/11/2023    CHOLESTEROL 234 (H) 03/09/2023    HDL 71 03/09/2023    TRIG 153 (H) 03/09/2023    LDLCALC 132 (H) 03/09/2023    NONHDLC 163 03/09/2023    VALPROICTOT 23 (L) 11/10/2023    AMMONIA 63 11/06/2023    YGW7ITZOTOWB 2.445 11/11/2023    FREET4 0.81 11/06/2023    T3FREE 2.27 (L) 10/16/2019    PREGUR negative 07/13/2022    PREGSERUM Negative 11/06/2023    RPR Non-Reactive 07/17/2022    HGBA1C 5.0 01/05/2023    EAG 97 01/05/2023       Mental Status at time of Discharge:   Appearance:  age appropriate, dressed appropriately, looks stated age, sitting comfortably in chair, adequate hygiene and grooming, cooperative with interview, good eye contact    Behavior:  cooperative   Speech:  normal rate, normal volume, normal pitch, fluent, clear, and coherent   Mood:  "good"   Affect:  mood-congruent   Language Within normal limits   Thought Process:  organized, logical, goal directed, normal rate of thoughts   Associations: intact associations      Thought Content:  No verbalized delusions   Perceptual Disturbances: Denies auditory or visual hallucinations and Does not appear to be responding to internal stimuli   Risk Potential: Denies suicidal or homicidal ideation, plan, or intent   Sensorium:  person, place, time, and current situation   Cognition:  Grossly intact   Consciousness:  alert and awake   Attention: attention span and concentration were age appropriate   Insight:  fair   Judgment: fair   Intellect appears to be of average intelligence   Gait/Station: normal gait/station   Motor Activity: no abnormal movements     Suicide/Homicide Risk Assessment:  Risk of Harm to Self:   The following ratings are based on assessment at the time of discharge  Demographic risk factors include: , age: over 48 or older  Historical Risk Factors include: none  Current Specific Risk Factors include: recent inpatient psychiatric admission - being discharged today  Protective Factors: no current suicidal ideation  Weapons/Firearms: none. The following steps have been taken to ensure weapons are properly secured: not applicable  Based on today's assessment, Chau presents the following risk of harm to self: minimal    Risk of Harm to Others: The following ratings are based on assessment at the time of discharge  Demographic Risk Factors include: none. Historical Risk Factors include: none. Current Specific Risk Factors include: none  Protective Factors: no current homicidal ideation  Weapons/Firearms: none. The following steps have been taken to ensure weapons are properly secured: not applicable  Based on today's assessment, Chau presents the following risk of harm to others: minimal    The following interventions are recommended: outpatient follow up with a psychiatrist, outpatient follow up with [facility] (ACT)    Discharge Medications:  See list above, as well as the after visit summary for all reconciled discharge medications provided to patient and family. Discharge instructions/Information to patient and family:   See after visit summary for information provided to patient and family. Provisions for Follow-Up Care:  See after visit summary for information related to follow-up care and any pertinent home health orders. JHONATAN Shanks 11/17/23      This note was completed in part utilizing Dragon dictation Software. Grammatical, translation, syntax errors, random word insertions, spelling mistakes, and incomplete sentences may be an occasional consequence of this system secondary to software limitations with voice recognition, ambient noise, and hardware issues. If you have any questions or concerns about the content, text, or information contained within the body of this dictation, please contact the provider for clarification.

## 2023-11-17 NOTE — PLAN OF CARE
Problem: Ineffective Coping  Goal: Cooperates with admission process  Description: Interventions:   - Complete admission process  Outcome: Progressing  Goal: Identifies ineffective coping skills  Outcome: Progressing  Goal: Identifies healthy coping skills  Outcome: Progressing  Goal: Demonstrates healthy coping skills  Outcome: Progressing  Goal: Participates in unit activities  Description: Interventions:  - Provide therapeutic environment   - Provide required programming   - Redirect inappropriate behaviors   Outcome: Progressing  Goal: Patient/Family participate in treatment and DC plans  Description: Interventions:  - Provide therapeutic environment  Outcome: Progressing  Goal: Patient/Family verbalizes awareness of resources  Outcome: Progressing  Goal: Understands least restrictive measures  Description: Interventions:  - Utilize least restrictive behavior  Outcome: Progressing  Goal: Free from restraint events  Description: - Utilize least restrictive measures   - Provide behavioral interventions   - Redirect inappropriate behaviors   Outcome: Progressing     Problem: Risk for Self Injury/Neglect  Goal: Treatment Goal: Remain safe during length of stay, learn and adopt new coping skills, and be free of self-injurious ideation, impulses and acts at the time of discharge  Outcome: Progressing  Goal: Verbalize thoughts and feelings  Description: Interventions:  - Assess and re-assess patient's lethality and potential for self-injury  - Engage patient in 1:1 interactions, daily, for a minimum of 15 minutes  - Encourage patient to express feelings, fears, frustrations, hopes  - Establish rapport/trust with patient   Outcome: Progressing  Goal: Refrain from harming self  Description: Interventions:  - Monitor patient closely, per order  - Develop a trusting relationship  - Supervise medication ingestion, monitor effects and side effects   Outcome: Progressing  Goal: Attend and participate in unit activities, including therapeutic, recreational, and educational groups  Description: Interventions:  - Provide therapeutic and educational activities daily, encourage attendance and participation, and document same in the medical record  - Obtain collateral information, encourage visitation and family involvement in care   Outcome: Progressing  Goal: Recognize maladaptive responses and adopt new coping mechanisms  Outcome: Progressing  Goal: Complete daily ADLs, including personal hygiene independently, as able  Description: Interventions:  - Observe, teach, and assist patient with ADLS  - Monitor and promote a balance of rest/activity, with adequate nutrition and elimination  Outcome: Progressing     Problem: Depression  Goal: Treatment Goal: Demonstrate behavioral control of depressive symptoms, verbalize feelings of improved mood/affect, and adopt new coping skills prior to discharge  Outcome: Progressing  Goal: Verbalize thoughts and feelings  Description: Interventions:  - Assess and re-assess patient's level of risk   - Engage patient in 1:1 interactions, daily, for a minimum of 15 minutes   - Encourage patient to express feelings, fears, frustrations, hopes   Outcome: Progressing  Goal: Refrain from harming self  Description: Interventions:  - Monitor patient closely, per order   - Supervise medication ingestion, monitor effects and side effects   Outcome: Progressing  Goal: Refrain from isolation  Description: Interventions:  - Develop a trusting relationship   - Encourage socialization   Outcome: Progressing  Goal: Refrain from self-neglect  Outcome: Progressing  Goal: Attend and participate in unit activities, including therapeutic, recreational, and educational groups  Description: Interventions:  - Provide therapeutic and educational activities daily, encourage attendance and participation, and document same in the medical record   Outcome: Progressing  Goal: Complete daily ADLs, including personal hygiene independently, as able  Description: Interventions:  - Observe, teach, and assist patient with ADLS  -  Monitor and promote a balance of rest/activity, with adequate nutrition and elimination   Outcome: Progressing     Problem: DISCHARGE PLANNING - CARE MANAGEMENT  Goal: Discharge to post-acute care or home with appropriate resources  Description: INTERVENTIONS:  - Conduct assessment to determine patient/family and health care team treatment goals, and need for post-acute services based on payer coverage, community resources, and patient preferences, and barriers to discharge  - Address psychosocial, clinical, and financial barriers to discharge as identified in assessment in conjunction with the patient/family and health care team  - Arrange appropriate level of post-acute services according to patient’s   needs and preference and payer coverage in collaboration with the physician and health care team  - Communicate with and update the patient/family, physician, and health care team regarding progress on the discharge plan  - Arrange appropriate transportation to post-acute venues  Outcome: Progressing

## 2023-11-17 NOTE — PLAN OF CARE
Pt attends group and participates.      Problem: Risk for Self Injury/Neglect  Goal: Attend and participate in unit activities, including therapeutic, recreational, and educational groups  Description: Interventions:  - Provide therapeutic and educational activities daily, encourage attendance and participation, and document same in the medical record  - Obtain collateral information, encourage visitation and family involvement in care   Outcome: Progressing

## 2023-11-17 NOTE — TELEPHONE ENCOUNTER
The pharmacist contacted the office, requesting a call back from the nurse practitioner to discuss what medications the patient is currently on. The writer informed the pharmacist that he would notify the nurse practitioner of the call. The nurse practitioner will be in contact at her earliest convenience. Please Review. Thank You.

## 2023-11-18 ENCOUNTER — NURSE TRIAGE (OUTPATIENT)
Dept: OTHER | Facility: OTHER | Age: 53
End: 2023-11-18

## 2023-11-18 NOTE — TELEPHONE ENCOUNTER
Regarding: Constipation  ----- Message from Tata Germain sent at 11/18/2023  4:35 PM EST -----  " I have not been able to go to the bathroom for one week now.  I am bloated and in pain."

## 2023-11-19 ENCOUNTER — APPOINTMENT (EMERGENCY)
Dept: CT IMAGING | Facility: HOSPITAL | Age: 53
End: 2023-11-19
Payer: MEDICARE

## 2023-11-19 ENCOUNTER — HOSPITAL ENCOUNTER (EMERGENCY)
Facility: HOSPITAL | Age: 53
Discharge: HOME | End: 2023-11-19
Attending: INTERNAL MEDICINE
Payer: MEDICARE

## 2023-11-19 ENCOUNTER — NURSE TRIAGE (OUTPATIENT)
Dept: OTHER | Facility: OTHER | Age: 53
End: 2023-11-19

## 2023-11-19 VITALS
DIASTOLIC BLOOD PRESSURE: 78 MMHG | HEART RATE: 84 BPM | OXYGEN SATURATION: 95 % | RESPIRATION RATE: 18 BRPM | TEMPERATURE: 98 F | SYSTOLIC BLOOD PRESSURE: 141 MMHG

## 2023-11-19 DIAGNOSIS — K59.00 CONSTIPATION, UNSPECIFIED CONSTIPATION TYPE: ICD-10-CM

## 2023-11-19 DIAGNOSIS — R10.32 LEFT LOWER QUADRANT ABDOMINAL PAIN: Primary | ICD-10-CM

## 2023-11-19 LAB
ALBUMIN SERPL BCP-MCNC: 3.8 G/DL (ref 3.5–5)
ALP SERPL-CCNC: 55 U/L (ref 34–104)
ALT SERPL W P-5'-P-CCNC: 20 U/L (ref 7–52)
ANION GAP SERPL CALCULATED.3IONS-SCNC: 7 MMOL/L
AST SERPL W P-5'-P-CCNC: 14 U/L (ref 13–39)
BASOPHILS # BLD MANUAL: 0 THOUSAND/UL (ref 0–0.1)
BASOPHILS NFR MAR MANUAL: 0 % (ref 0–1)
BILIRUB SERPL-MCNC: 0.22 MG/DL (ref 0.2–1)
BUN SERPL-MCNC: 16 MG/DL (ref 5–25)
CALCIUM SERPL-MCNC: 8.9 MG/DL (ref 8.4–10.2)
CHLORIDE SERPL-SCNC: 105 MMOL/L (ref 96–108)
CO2 SERPL-SCNC: 24 MMOL/L (ref 21–32)
CREAT SERPL-MCNC: 0.6 MG/DL (ref 0.6–1.3)
EOSINOPHIL # BLD MANUAL: 0 THOUSAND/UL (ref 0–0.4)
EOSINOPHIL NFR BLD MANUAL: 0 % (ref 0–6)
ERYTHROCYTE [DISTWIDTH] IN BLOOD BY AUTOMATED COUNT: 13.9 % (ref 11.6–15.1)
GFR SERPL CREATININE-BSD FRML MDRD: 105 ML/MIN/1.73SQ M
GLUCOSE SERPL-MCNC: 92 MG/DL (ref 65–140)
HCT VFR BLD AUTO: 42.1 % (ref 34.8–46.1)
HGB BLD-MCNC: 13.5 G/DL (ref 11.5–15.4)
LIPASE SERPL-CCNC: 25 U/L (ref 11–82)
LYMPHOCYTES # BLD AUTO: 2.33 THOUSAND/UL (ref 0.6–4.47)
LYMPHOCYTES # BLD AUTO: 28 % (ref 14–44)
MCH RBC QN AUTO: 29.9 PG (ref 26.8–34.3)
MCHC RBC AUTO-ENTMCNC: 32.1 G/DL (ref 31.4–37.4)
MCV RBC AUTO: 93 FL (ref 82–98)
MONOCYTES # BLD AUTO: 0.7 THOUSAND/UL (ref 0–1.22)
MONOCYTES NFR BLD: 9 % (ref 4–12)
NEUTROPHILS # BLD MANUAL: 4.73 THOUSAND/UL (ref 1.85–7.62)
NEUTS SEG NFR BLD AUTO: 61 % (ref 43–75)
PLATELET # BLD AUTO: 172 THOUSANDS/UL (ref 149–390)
PLATELET BLD QL SMEAR: ADEQUATE
PMV BLD AUTO: 10.3 FL (ref 8.9–12.7)
POTASSIUM SERPL-SCNC: 3.6 MMOL/L (ref 3.5–5.3)
PROT SERPL-MCNC: 6.8 G/DL (ref 6.4–8.4)
RBC # BLD AUTO: 4.52 MILLION/UL (ref 3.81–5.12)
RBC MORPH BLD: NORMAL
SODIUM SERPL-SCNC: 136 MMOL/L (ref 135–147)
VARIANT LYMPHS # BLD AUTO: 2 %
WBC # BLD AUTO: 7.75 THOUSAND/UL (ref 4.31–10.16)

## 2023-11-19 PROCEDURE — 80053 COMPREHEN METABOLIC PANEL: CPT | Performed by: INTERNAL MEDICINE

## 2023-11-19 PROCEDURE — 85027 COMPLETE CBC AUTOMATED: CPT | Performed by: INTERNAL MEDICINE

## 2023-11-19 PROCEDURE — 36415 COLL VENOUS BLD VENIPUNCTURE: CPT | Performed by: INTERNAL MEDICINE

## 2023-11-19 PROCEDURE — G1004 CDSM NDSC: HCPCS

## 2023-11-19 PROCEDURE — 74177 CT ABD & PELVIS W/CONTRAST: CPT

## 2023-11-19 PROCEDURE — 83690 ASSAY OF LIPASE: CPT | Performed by: INTERNAL MEDICINE

## 2023-11-19 PROCEDURE — 99285 EMERGENCY DEPT VISIT HI MDM: CPT

## 2023-11-19 PROCEDURE — 99285 EMERGENCY DEPT VISIT HI MDM: CPT | Performed by: INTERNAL MEDICINE

## 2023-11-19 PROCEDURE — 96361 HYDRATE IV INFUSION ADD-ON: CPT

## 2023-11-19 PROCEDURE — 85007 BL SMEAR W/DIFF WBC COUNT: CPT | Performed by: INTERNAL MEDICINE

## 2023-11-19 PROCEDURE — 96360 HYDRATION IV INFUSION INIT: CPT

## 2023-11-19 RX ADMIN — IOHEXOL 100 ML: 350 INJECTION, SOLUTION INTRAVENOUS at 11:50

## 2023-11-19 RX ADMIN — SODIUM CHLORIDE 1000 ML: 0.9 INJECTION, SOLUTION INTRAVENOUS at 11:15

## 2023-11-19 NOTE — ED PROVIDER NOTES
History  Chief Complaint   Patient presents with    Abdominal Pain     Pt reports having constipation x6 days. Has been using metamucil and took laxatives yesterday. States she had a small amount of stool this morning and developed abdominal pain around 0600. Slightly nauseated no vomiting. This is a 47years old, came for having abdominal pain. Patient stated she is constipated. Patient described with LLQ abdominal pain but she denies any vomiting or fever. Patient was admitted to a psychiatric facility and just discharged 2 days ago. Patient was prescribed with Metamucil and she has small BM 2 days ago and another 1 this AM.  Patient has no urinary symptoms. Patient is morbidly obese. Patient has history of surgery for umbilical hernia repair. Patient sitting comfortable at the ER with no distress. Patient has history of COPD, bipolar disorder, hypertension, Sjogren syndrome. Prior to Admission Medications   Prescriptions Last Dose Informant Patient Reported? Taking?    OXcarbazepine (TRILEPTAL) 300 mg tablet   No No   Sig: Take 1 tablet (300 mg total) by mouth every 12 (twelve) hours   atoMOXetine (STRATTERA) 40 mg capsule   No No   Sig: Take 1 capsule (40 mg total) by mouth daily   buPROPion (WELLBUTRIN XL) 300 mg 24 hr tablet   No No   Sig: Take 1 tablet (300 mg total) by mouth daily   cefpodoxime (VANTIN) 200 mg tablet   No No   Sig: Take 2 tablets (400 mg total) by mouth 2 (two) times a day with meals for 4 doses   cholecalciferol (VITAMIN D3) 1,000 units tablet   No No   Sig: Take 1 tablet (1,000 Units total) by mouth daily   cloNIDine (CATAPRES) 0.1 mg tablet   No No   Sig: Take 1 tablet (0.1 mg total) by mouth every 12 (twelve) hours   furosemide (LASIX) 20 mg tablet   No No   Sig: Take 1 tablet (20 mg total) by mouth daily Do not start before 2023.   haloperidol decanoate (Haldol Decanoate) 50 mg/mL injection   No No   Si mL (50 mg total) by Intramuscular - haloperidol decanoate route every 28 days Do not start before 2023. levothyroxine (Euthyrox) 125 mcg tablet   No No   Sig: Take 1 tablet (125 mcg total) by mouth daily in the early morning   losartan (COZAAR) 100 MG tablet   No No   Sig: Take 1 tablet (100 mg total) by mouth daily   lurasidone 60 MG TABS   No No   Sig: Take 1 tablet (60 mg total) by mouth daily with breakfast Do not start before 2023.    melatonin 3 mg   No No   Sig: Take 1 tablet (3 mg total) by mouth daily at bedtime   naltrexone (REVIA) 50 mg tablet   No No   Sig: Take 1 tablet (50 mg total) by mouth daily   nystatin (MYCOSTATIN) powder   No No   Sig: Apply topically 2 (two) times a day   pantoprazole (PROTONIX) 20 mg tablet  Self No No   Sig: Take 1 tablet (20 mg total) by mouth daily in the early morning   rOPINIRole (REQUIP) 1 mg tablet  Self Yes No   Si mg   topiramate (TOPAMAX) 200 MG tablet   No No   Sig: Take 1 tablet (200 mg total) by mouth 2 (two) times a day   traZODone (DESYREL) 100 mg tablet   No No   Sig: Take 1 tablet (100 mg total) by mouth daily at bedtime as needed for sleep   trospium chloride (SANCTURA) 20 mg tablet  Self Yes No   Sig: Take 20 mg by mouth 2 (two) times a day   warfarin (COUMADIN) 5 mg tablet   No No   Sig: Take 1 tablet (5 mg total) by mouth daily      Facility-Administered Medications: None       Past Medical History:   Diagnosis Date    Anxiety     Arthritis     oa cassandra knees    Asthma     good control- no medications    Yan's esophagus     Bipolar affective disorder (HCC)     Cannabis abuse with unspecified 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HCC)     Chronic pain disorder     lumbar    COPD (chronic obstructive pulmonary disease) (HCC)     CPAP (continuous positive airway pressure) dependence     Degenerative disc disease at L5-S1 level     Deliberate self-cutting     9/15/22per pt has not done recently-- does see a therapist and psychiatrist regularly    Depression 2008    Disease of thyroid gland     hypo    MARTINEZ (dyspnea on exertion)     per pt "has had this with exertion and not new"    Drug overdose 10/28/2008    suicide attempt and again drug overdose 7/2022-- [facility] floor and than transferred to [facility]    Dysphagia     Dyspnea     Edema     BLE    Family history of blood clots     GERD (gastroesophageal reflux disease)     Headache(784.0)     Headache, tension-type     Hemorrhage of rectum and anus 10/02/2017    Formatting of this note might be different from the original.  Added automatically from request for surgery [id number]    High blood pressure     History of COVID-19 12/30/2020    Symptoms started on 12/30/2020 and then got worse. Today she is feeling a little bit better. She is a candidate for monoclonal antibody infusion and set for 1/6/21 @ 1pm at [facility]. 01/07/21 - telemedicine -     Hyperlipidemia     Hypertension     Knee pain, bilateral     Especially right    Medical marijuana use     Memory loss     Migraines     Obesity     Overactive bladder     Pulmonary emboli (HCC)     Sjogren's disease ([address])     Sleep apnea     Stress incontinence     Suicidal ideations     Teeth missing     Tremor     per pt Tremors of Right Leg and both Arms    Visual impairment     Wears glasses        Past Surgical History:   Procedure Laterality Date    BREAST CYST EXCISION Right 1989    CARPAL TUNNEL RELEASE Left     CHOLECYSTECTOMY  05/2003    Laparoscopic    COLONOSCOPY      01/12/2009    DILATION AND CURETTAGE OF UTERUS      ELBOW SURGERY Left     x2.  No hardware    ESOPHAGOGASTRODUODENOSCOPY      FOOT SURGERY Left     Plantar fasciotomy    HERNIA REPAIR      HYSTERECTOMY      laporoscopic, partial    KNEE ARTHROSCOPY Bilateral     OOPHORECTOMY Left 10/2015    UT GASTROCNEMIUS RECESSION Left 02/24/2021    Procedure: RECESSION GASTROC OPEN;  Surgeon: Quinn Schwab, DPM;  Location: [facility] MAIN OR;  Service: Podiatry    UT RPR UMBILICAL HRNA 5 YRS/> REDUCIBLE N/A 04/24/2019 Procedure: REPAIR HERNIA UMBILICAL LAPAROSCOPIC W/ ROBOTICS;  Surgeon: Mg Lerner MD;  Location: [facility] Main OR;  Service: General    IA SPHINCTEROTOMY ANAL DIVISION SPHINCTER [address] [facility] N/A 2018    Procedure: EUA, LEFT PARTIAL INTERNAL SPHINCTEROTOMY;  Surgeon: Shahrzad Rodriguez MD;  Location: [address] MAIN OR;  Service: Colorectal    IA TNOT ELBOW LATERAL/MEDIAL DEBRIDE OPEN TDN RPR Left 2022    Procedure: RELEASE EPICONDYLAR ELBOW MEDIAL;  Surgeon: Adam Morgan MD;  Location: [facility] MAIN OR;  Service: Orthopedics    REDUCTION MAMMAPLASTY Bilateral 1999    REPAIR LACERATION Left     left hand-2009    REPLACEMENT TOTAL KNEE Right     ROTATOR CUFF REPAIR Left     TONSILECTOMY AND ADNOIDECTOMY      US GUIDED MSK PROCEDURE  2021    VASCULAR SURGERY      VEIN LIGATION Bilateral     WISDOM TOOTH EXTRACTION         Family History   Problem Relation Age of Onset    Kidney cancer Mother     Diabetes Mother     Depression Mother     Stroke Mother     Arthritis Mother     Cancer Mother     Psychiatric Illness Mother     No Known Problems Father     No Known Problems Sister     No Known Problems Maternal Grandmother     No Known Problems Maternal Grandfather     No Known Problems Paternal Grandmother     No Known Problems Paternal Grandfather     Colon cancer Maternal Uncle     Colon cancer Maternal Uncle     Colon cancer Paternal Uncle     Colon cancer Family     Alcohol abuse Sister     Asthma Sister      I have reviewed and agree with the history as documented.     E-Cigarette/Vaping    E-Cigarette Use Never User     Comments medical THC      E-Cigarette/Vaping Substances    Nicotine No     THC Yes     CBD No     Flavoring No     Other No     Unknown No      Social History     Tobacco Use    Smoking status: Former     Packs/day: 2.00     Years: 30.00     Total pack years: 60.00     Types: Cigarettes     Start date: 1985     Quit date: 2018     Years since quittin.8    Smokeless tobacco: Never Tobacco comments:     Started at age 14. Vaping Use    Vaping Use: Never used   Substance Use Topics    Alcohol use: Not Currently     Comment: Recovering alcoholic    Drug use: Yes     Types: Marijuana, Methamphetamines     Comment: 2 months off meth       Review of Systems   Constitutional:  Negative for fatigue and fever. HENT:  Negative for congestion, ear discharge, ear pain, sneezing, sore throat and trouble swallowing. Respiratory:  Negative for cough and shortness of breath. Cardiovascular:  Negative for chest pain and palpitations. Gastrointestinal:  Positive for abdominal pain and constipation. Negative for diarrhea, nausea and vomiting. Genitourinary:  Negative for difficulty urinating, dysuria, flank pain and frequency. Musculoskeletal:  Negative for back pain, myalgias, neck pain and neck stiffness. Skin:  Negative for color change, pallor and rash. Neurological:  Negative for dizziness, tremors, weakness, light-headedness and headaches. Hematological:  Negative for adenopathy. Psychiatric/Behavioral:  Negative for agitation and behavioral problems. Physical Exam  Physical Exam  Vitals and nursing note reviewed. Constitutional:       General: She is not in acute distress. Appearance: She is well-developed. She is obese. She is not ill-appearing, toxic-appearing or diaphoretic. HENT:      Head: Normocephalic and atraumatic. Mouth/Throat:      Pharynx: No pharyngeal swelling or oropharyngeal exudate. Eyes:      General: No scleral icterus. Extraocular Movements: Extraocular movements intact. Pupils: Pupils are equal, round, and reactive to light. Cardiovascular:      Rate and Rhythm: Normal rate and regular rhythm. Heart sounds: Normal heart sounds. No murmur heard. No friction rub. No gallop. Pulmonary:      Effort: Pulmonary effort is normal. No respiratory distress. Breath sounds: Normal breath sounds.  No wheezing, rhonchi or rales.   Chest:      Chest wall: No tenderness. Abdominal:      General: Abdomen is flat. Bowel sounds are normal. There is no distension or abdominal bruit. Palpations: Abdomen is soft. There is no shifting dullness, fluid wave, hepatomegaly, splenomegaly, mass or pulsatile mass. Tenderness: There is abdominal tenderness in the left lower quadrant. There is no right CVA tenderness, left CVA tenderness, guarding or rebound. Negative signs include Lopez's sign, Rovsing's sign, McBurney's sign, psoas sign and obturator sign. Hernia: No hernia is present. Musculoskeletal:         General: No tenderness or deformity. Normal range of motion. Cervical back: Normal range of motion and neck supple. Skin:     General: Skin is warm and dry. Capillary Refill: Capillary refill takes less than 2 seconds. Coloration: Skin is not cyanotic, jaundiced, mottled or pale. Findings: No erythema or rash. Neurological:      Mental Status: She is alert and oriented to person, place, and time.    Psychiatric:         Behavior: Behavior normal.         Vital Signs  ED Triage Vitals   Temperature Pulse Respirations Blood Pressure SpO2   11/19/23 1037 11/19/23 1037 11/19/23 1037 11/19/23 1037 11/19/23 1037   98 °F (36.7 °C) 90 20 165/82 97 %      Temp Source Heart Rate Source Patient Position - Orthostatic VS BP Location FiO2 (%)   11/19/23 1037 11/19/23 1322 11/19/23 1322 11/19/23 1322 --   Oral Monitor Sitting Right arm       Pain Score       11/19/23 1037       7           Vitals:    11/19/23 1037 11/19/23 1322   BP: 165/82 141/78   Pulse: 90 84   Patient Position - Orthostatic VS:  Sitting         Visual Acuity      ED Medications  Medications   sodium chloride 0.9 % bolus 1,000 mL (0 mL Intravenous Stopped 11/19/23 1325)   iohexol (OMNIPAQUE) 350 MG/ML injection (SINGLE-DOSE) 100 mL (100 mL Intravenous Given 11/19/23 1150)       Diagnostic Studies  Results Reviewed       Procedure Component Value Units Date/Time    RBC Morphology Reflex Test [[id number]] Collected: 11/19/23 1114    Lab Status: Final result Specimen: Blood from Arm, Left Updated: 11/19/23 1301    CBC and differential [[id number]]  (Normal) Collected: 11/19/23 1114    Lab Status: Final result Specimen: Blood from Arm, Left Updated: 11/19/23 1211     WBC 7.75 Thousand/uL      RBC 4.52 Million/uL      Hemoglobin 13.5 g/dL      Hematocrit 42.1 %      MCV 93 fL      MCH 29.9 pg      MCHC 32.1 g/dL      RDW 13.9 %      MPV 10.3 fL      Platelets 869 Thousands/uL     Narrative: This is an appended report. These results have been appended to a previously verified report.     Manual Differential(PHLEBS Do Not Order) [[id number]]  (Abnormal) Collected: 11/19/23 1114    Lab Status: Final result Specimen: Blood from Arm, Left Updated: 11/19/23 1211     Segmented % 61 %      Lymphocytes % 28 %      Monocytes % 9 %      Eosinophils, % 0 %      Basophils % 0 %      Atypical Lymphocytes % 2 %      Absolute Neutrophils 4.73 Thousand/uL      Lymphocytes Absolute 2.33 Thousand/uL      Monocytes Absolute 0.70 Thousand/uL      Eosinophils Absolute 0.00 Thousand/uL      Basophils Absolute 0.00 Thousand/uL      Total Counted --     RBC Morphology Normal     Platelet Estimate Adequate    Comprehensive metabolic panel [[id number]] Collected: 11/19/23 1114    Lab Status: Final result Specimen: Blood from Arm, Left Updated: 11/19/23 1137     Sodium 136 mmol/L      Potassium 3.6 mmol/L      Chloride 105 mmol/L      CO2 24 mmol/L      ANION GAP 7 mmol/L      BUN 16 mg/dL      Creatinine 0.60 mg/dL      Glucose 92 mg/dL      Calcium 8.9 mg/dL      AST 14 U/L      ALT 20 U/L      Alkaline Phosphatase 55 U/L      Total Protein 6.8 g/dL      Albumin 3.8 g/dL      Total Bilirubin 0.22 mg/dL      eGFR 105 ml/min/1.73sq m     Narrative:      [facility] (CKD):     Stage 1 with normal or high GFR (GFR > 90 mL/min/1.73 square meters)    Stage 2 Mild CKD (GFR = 60-89 mL/min/1.73 square meters)    Stage 3A Moderate CKD (GFR = 45-59 mL/min/1.73 square meters)    Stage 3B Moderate CKD (GFR = 30-44 mL/min/1.73 square meters)    Stage 4 Severe CKD (GFR = 15-29 mL/min/1.73 square meters)    Stage 5 End Stage CKD (GFR <15 mL/min/1.73 square meters)  Note: GFR calculation is accurate only with a steady state creatinine    Lipase [[id number]]  (Normal) Collected: 11/19/23 1114    Lab Status: Final result Specimen: Blood from Arm, Left Updated: 11/19/23 1137     Lipase 25 u/L                    CT abdomen pelvis with contrast   Final Result by Kiera Crouch MD (11/19 1246)   1. Mild constipation noted. 2. No acute inflammatory or infectious process with a normal appendix identified. Workstation performed: [id number]                    Procedures  Procedures         ED Course                                             Medical Decision Making  This is a 47years old morbidly obese came for having constipation and LLQ abdominal pain. Patient take Metamucil and she has small BM 2 days ago and early this morning. Patient has no fever she has nausea no vomiting. Patient denies any urinary symptoms. Patient just discharged from a psych facility 2 days ago and she has history of bipolar disorder and anxiety. Physical exam pertinent for mild tenderness at the LLQ area. Labs reviewed came back WBCs and CMP are within normal limits. CT abdomen pelvis shows no acute inflammatory infectious process mild constipation normal appendix. Patient instructed to drink plenty of water. Giving vegetables she can continue on Metamucil or take MiraLAX and Senokot all would help her constipation. The patient constipation is very mild. Amount and/or Complexity of Data Reviewed  Labs: ordered. Details: WBC; WNL. CMP; WNL  Radiology: ordered. Details: CT abdomen pelvis with IV contrast shows mild constipation.   No acute inflammatory infectious process. Normal appendix. Risk  Prescription drug management. Disposition  Final diagnoses:   Left lower quadrant abdominal pain   Constipation, unspecified constipation type     Time reflects when diagnosis was documented in both MDM as applicable and the Disposition within this note       Time User Action Codes Description Comment    11/19/2023  1:01 PM Aneta Taylor Add [R10.32] Left lower quadrant abdominal pain     11/19/2023  1:01 PM Aneta Taylor Add [K59.00] Constipation, unspecified constipation type           ED Disposition       ED Disposition   Discharge    Condition   Stable    Date/Time   Sun Nov 19, 2023  1:01 PM    Comment   Faina Parsley [facility] discharge to home/self care.                    Follow-up Information       Follow up With Specialties Details Why Contact Info    JHONATAN Arevalo Nurse Practitioner In 1 week  [address] [place] [address]  [phone]              Discharge Medication List as of 11/19/2023  1:02 PM        CONTINUE these medications which have NOT CHANGED    Details   atoMOXetine (STRATTERA) 40 mg capsule Take 1 capsule (40 mg total) by mouth daily, Starting Fri 11/17/2023, Until Tue 1/16/2024, Normal      buPROPion (WELLBUTRIN XL) 300 mg 24 hr tablet Take 1 tablet (300 mg total) by mouth daily, Starting Thu 11/16/2023, Until Mon 1/15/2024, Normal      cholecalciferol (VITAMIN D3) 1,000 units tablet Take 1 tablet (1,000 Units total) by mouth daily, Starting Thu 11/16/2023, Until Mon 1/15/2024, Normal      cloNIDine (CATAPRES) 0.1 mg tablet Take 1 tablet (0.1 mg total) by mouth every 12 (twelve) hours, Starting Thu 11/16/2023, Until Sat 12/16/2023, Normal      furosemide (LASIX) 20 mg tablet Take 1 tablet (20 mg total) by mouth daily Do not start before November 17, 2023., Starting Fri 11/17/2023, Until Tue 1/16/2024, Normal      haloperidol decanoate (Haldol Decanoate) 50 mg/mL injection 1 mL (50 mg total) by Intramuscular - haloperidol decanoate route every 28 days Do not start before November 22, 2023., Starting Wed 11/22/2023, No Print      levothyroxine (Euthyrox) 125 mcg tablet Take 1 tablet (125 mcg total) by mouth daily in the early morning, Starting Thu 11/16/2023, Until Mon 1/15/2024, Normal      losartan (COZAAR) 100 MG tablet Take 1 tablet (100 mg total) by mouth daily, Starting Fri 11/17/2023, Until Tue 1/16/2024, Normal      lurasidone 60 MG TABS Take 1 tablet (60 mg total) by mouth daily with breakfast Do not start before November 17, 2023., Starting Fri 11/17/2023, Until Tue 1/16/2024, Normal      melatonin 3 mg Take 1 tablet (3 mg total) by mouth daily at bedtime, Starting Thu 11/16/2023, Until Mon 1/15/2024, Normal      naltrexone (REVIA) 50 mg tablet Take 1 tablet (50 mg total) by mouth daily, Starting Thu 11/16/2023, Until Mon 1/15/2024, Normal      nystatin (MYCOSTATIN) powder Apply topically 2 (two) times a day, Starting Thu 11/16/2023, Until Sat 12/16/2023, Normal      OXcarbazepine (TRILEPTAL) 300 mg tablet Take 1 tablet (300 mg total) by mouth every 12 (twelve) hours, Starting Thu 11/16/2023, Until Mon 1/15/2024, Normal      pantoprazole (PROTONIX) 20 mg tablet Take 1 tablet (20 mg total) by mouth daily in the early morning, Starting Fri 10/6/2023, Until Fri 11/10/2023, Normal      rOPINIRole (REQUIP) 1 mg tablet 1 mg, Starting Fri 10/13/2023, Historical Med      topiramate (TOPAMAX) 200 MG tablet Take 1 tablet (200 mg total) by mouth 2 (two) times a day, Starting Thu 11/16/2023, Until Mon 1/15/2024, Normal      traZODone (DESYREL) 100 mg tablet Take 1 tablet (100 mg total) by mouth daily at bedtime as needed for sleep, Starting Thu 11/16/2023, Until Wed 2/14/2024 at 2359, Normal      trospium chloride (SANCTURA) 20 mg tablet Take 20 mg by mouth 2 (two) times a day, Starting Thu 1/12/2023, Until Fri 1/12/2024, Historical Med      warfarin (COUMADIN) 5 mg tablet Take 1 tablet (5 mg total) by mouth daily, Starting Fri 11/17/2023, Until Sun 12/17/2023, Normal           STOP taking these medications       cefpodoxime (VANTIN) 200 mg tablet Comments:   Reason for Stopping:               No discharge procedures on file.     PDMP Review         Value Time User    PDMP Reviewed  Yes 11/10/2023  2:54 PM Jessica Leblanc, [place]            ED Provider  Electronically Signed by             Krystina Christensen MD  11/20/23 9718

## 2023-11-19 NOTE — TELEPHONE ENCOUNTER
Reason for Disposition  • [1] Constipation persists > 1 week AND [2] no improvement after using CARE ADVICE    Answer Assessment - Initial Assessment Questions  1. STOOL PATTERN OR FREQUENCY: "How often do you pass bowel movements (BMs)?"  (Normal range: tid to q 3 days)  "When was the last BM passed?"        Small BM this morning. Last BM was on Monday 11/13    2. STRAINING: "Do you have to strain to have a BM?"       No     3. RECTAL PAIN: "Does your rectum hurt when the stool comes out?" If Yes, ask: "Do you have hemorrhoids? How bad is the pain?"  (Scale 1-10; or mild, moderate, severe)      No    4. STOOL COMPOSITION: "Are the stools hard?"       Formed but loose this morning. 5. BLOOD ON STOOLS: "Has there been any blood on the toilet tissue or on the surface of the BM?" If Yes, ask: "When was the last time?"       Denies    6. CHRONIC CONSTIPATION: "Is this a new problem for you?"  If no, ask: "How long have you had this problem?" (days, weeks, months)       Denies    7. CHANGES IN DIET OR HYDRATION: "Have there been any recent changes in your diet?" "How much fluids are you drinking consuming on a daily basis?"  "How much have you had to drink today?"      Is drinking fluids    8. MEDICATIONS: "Have you been taking any new medications?" "Are you taking any narcotic pain medications?" (e.g., Vicoden, Percocet, morphine, dilaudid)      One new psych med    9. LAXATIVES: "Have you been using any stool softeners, laxatives, or enemas?"  If yes, ask "What, how often, and when was the last time?"  10. ACTIVITY:  "How much walking do you do every day? on a daily basis?"  "Has your activity level decreased in the past week?"         Took 3 Dulcolax tablets yesterday. 11. CAUSE: "What do you think is causing the constipation?"         Unknown    12. OTHER SYMPTOMS: "Do you have any other symptoms?" (e.g., abdominal pain, bloating, fever, vomiting)        Off and on abdominal pain since 7:30 am. Denies fever. Abdomen is soft to touch but feels bloated.     Protocols used: Constipation-ADULT-AH

## 2023-11-19 NOTE — TELEPHONE ENCOUNTER
Regarding: Abd Pain  ----- Message from Luis A Barfield sent at 11/19/2023  7:38 AM EST -----  "I have not been able to go to the bathroom for one week now. They told me to try laxatives at the hospital last night I took them and went a little bit this morning.  Now I am in a lot of pain in my stomach"

## 2023-11-19 NOTE — DISCHARGE INSTRUCTIONS
Follow-up with your PMD..  Drink plenty of water. Take Metamucil or MiraLAX once daily. Take Senokot 1 tablet at nighttime. Labs Reviewed   MANUAL DIFFERENTIAL(PHLEBS DO NOT ORDER) - Abnormal       Result Value Ref Range Status    Segmented % 61  43 - 75 % Final    Lymphocytes % 28  14 - 44 % Final    Monocytes % 9  4 - 12 % Final    Eosinophils, % 0  0 - 6 % Final    Basophils % 0  0 - 1 % Final    Atypical Lymphocytes % 2 (*) <=0 % Final    Absolute Neutrophils 4.73  1.85 - 7.62 Thousand/uL Final    Lymphocytes Absolute 2.33  0.60 - 4.47 Thousand/uL Final    Monocytes Absolute 0.70  0.00 - 1.22 Thousand/uL Final    Eosinophils Absolute 0.00  0.00 - 0.40 Thousand/uL Final    Basophils Absolute 0.00  0.00 - 0.10 Thousand/uL Final    Total Counted     Final    RBC Morphology Normal   Final    Platelet Estimate Adequate  Adequate Final   CBC AND DIFFERENTIAL - Normal    WBC 7.75  4.31 - 10.16 Thousand/uL Final    RBC 4.52  3.81 - 5.12 Million/uL Final    Hemoglobin 13.5  11.5 - 15.4 g/dL Final    Hematocrit 42.1  34.8 - 46.1 % Final    MCV 93  82 - 98 fL Final    MCH 29.9  26.8 - 34.3 pg Final    MCHC 32.1  31.4 - 37.4 g/dL Final    RDW 13.9  11.6 - 15.1 % Final    MPV 10.3  8.9 - 12.7 fL Final    Platelets 618  352 - 390 Thousands/uL Final    Narrative: This is an appended report. These results have been appended to a previously verified report. LIPASE - Normal    Lipase 25  11 - 82 u/L Final   COMPREHENSIVE METABOLIC PANEL    Sodium 414  135 - 147 mmol/L Final    Potassium 3.6  3.5 - 5.3 mmol/L Final    Chloride 105  96 - 108 mmol/L Final    CO2 24  21 - 32 mmol/L Final    ANION GAP 7  mmol/L Final    BUN 16  5 - 25 mg/dL Final    Creatinine 0.60  0.60 - 1.30 mg/dL Final    Comment: Standardized to IDMS reference method    Glucose 92  65 - 140 mg/dL Final    Comment: If the patient is fasting, the ADA then defines impaired fasting glucose as > 100 mg/dL and diabetes as > or equal to 123 mg/dL.     Calcium 8.9  8.4 - 10.2 mg/dL Final    AST 14  13 - 39 U/L Final    ALT 20  7 - 52 U/L Final    Comment: Specimen collection should occur prior to Sulfasalazine administration due to the potential for falsely depressed results. Alkaline Phosphatase 55  34 - 104 U/L Final    Total Protein 6.8  6.4 - 8.4 g/dL Final    Albumin 3.8  3.5 - 5.0 g/dL Final    Total Bilirubin 0.22  0.20 - 1.00 mg/dL Final    Comment: Use of this assay is not recommended for patients undergoing treatment with eltrombopag due to the potential for falsely elevated results. N-acetyl-p-benzoquinone imine (metabolite of Acetaminophen) will generate erroneously low results in samples for patients that have taken an overdose of Acetaminophen. eGFR 105  ml/min/1.73sq m Final    Narrative:     [place] guidelines for Chronic Kidney Disease (CKD):     Stage 1 with normal or high GFR (GFR > 90 mL/min/1.73 square meters)    Stage 2 Mild CKD (GFR = 60-89 mL/min/1.73 square meters)    Stage 3A Moderate CKD (GFR = 45-59 mL/min/1.73 square meters)    Stage 3B Moderate CKD (GFR = 30-44 mL/min/1.73 square meters)    Stage 4 Severe CKD (GFR = 15-29 mL/min/1.73 square meters)    Stage 5 End Stage CKD (GFR <15 mL/min/1.73 square meters)  Note: GFR calculation is accurate only with a steady state creatinine   RBC MORPHOLOGY REFLEX TEST     CT abdomen pelvis with contrast   Final Result   1. Mild constipation noted. 2. No acute inflammatory or infectious process with a normal appendix identified.                   Workstation performed: [id number]

## 2023-11-20 ENCOUNTER — TELEPHONE (OUTPATIENT)
Dept: HEMATOLOGY ONCOLOGY | Facility: CLINIC | Age: 53
End: 2023-11-20

## 2023-11-20 ENCOUNTER — TELEPHONE (OUTPATIENT)
Dept: PULMONOLOGY | Facility: CLINIC | Age: 53
End: 2023-11-20

## 2023-11-20 ENCOUNTER — PATIENT OUTREACH (OUTPATIENT)
Dept: CASE MANAGEMENT | Facility: OTHER | Age: 53
End: 2023-11-20

## 2023-11-20 NOTE — TELEPHONE ENCOUNTER
Patient Call    Who are you speaking with? Patient    If it is not the patient, are they listed on an active communication consent form? Yes   What is the reason for this call? Out of hospital, is a tatoo on hand ok with blood thinners? Does this require a call back? Yes   If a call back is required, please list best call back number [phone]    If a call back is required, advise that a message will be forwarded to their care team and someone will return their call as soon as possible. Did you relay this information to the patient?  Yes

## 2023-11-20 NOTE — PROGRESS NOTES
Outpatient Care Management Note:  Call placed to Mark Warner after recent ER visit. She has moved her bowels after taking Dulcolax and abdominal pain has resolved. Mark Warner remains drug free for over 2 months. Reviewed recent medication changes. She did have an appointment with her therapist today and will see her psychiatrist on 11/22/2023. Her tremors have improved since the Depakote was discontinued. Mark Warner continues to struggle with left knee pain. She has an appointment with [facility] orthopedics tomorrow and is hopeful they will develop a plan that can provide some relief so she is able to be more active. Mark Warner has an appointment with bariatrics in March and is trying to be more mindful of what she is currently eating. Mark Warner has no symptoms of COPD exacerbation. Denies any cough or SOB. Reviewed symptoms that would require a call to her PCP as she does not see pulmonary medicine. Denies any needs at this time. Encouraged to call with any questions or concerns. Care Planning:   Care Plan Evaluation   Referrals or Resources-no new referrals   Referral/Resource Follow-up: CM need to follow-up with patient/caregiver at a future outreach due to due to recent hospitalization, Mark Warner has not reviewed education mailed to her. Planning for continuity of care-will outreach in approximately two weeks. Collaborative approaches-message routed to  CM   Patient/Caregiver desired level of involvement-Blanca remains actively involved in her care.     Barrier Analysis: No barriers identified   Follow-up Schedule: Medium  Communication of self-management plans to patients: Verbally reviewed with patient/caregiver

## 2023-11-20 NOTE — TELEPHONE ENCOUNTER
Pt left v/m at [address] to resched appt that needed to be canc. Called pt back and first avail was not until Jan.  She is going to contact [facility] where original appt was sched and will call back if she wants to sched here.

## 2023-11-20 NOTE — PROGRESS NOTES
Outpatient Care Management Note:  In basket ER alert received. Chart notes reviewed. Outreach scheduled.

## 2023-11-21 ENCOUNTER — PATIENT OUTREACH (OUTPATIENT)
Dept: CASE MANAGEMENT | Facility: HOSPITAL | Age: 53
End: 2023-11-21

## 2023-11-21 ENCOUNTER — TELEPHONE (OUTPATIENT)
Dept: HEMATOLOGY ONCOLOGY | Facility: CLINIC | Age: 53
End: 2023-11-21

## 2023-11-21 NOTE — PROGRESS NOTES
Pt shared that she has an appointment with her psychiatrist tomorrow with [facility]. Pt had therapy yesterday and is upset it was a 20 minute visit instead of one hour. Pt stated she will ask to sign a release for RAIN HERNANDEZ to call [facility] at her appointment tomorrow. Pt stated [facility] care team includes:  Annemarie Brower [facility] therapist  Cailin (?) another therapist on team  Dr. Ralph Nieves psychiatrist    Pt stated she will reach out if she has any additional needs. She is agreeable to RAIN HERNANDEZ outreaching [facility] next week to discuss their services. [phone]; spoke with Amie Mtz (director). RAIN HERNANDEZ shared about conversation with pt regarding wanting to know her appointment time. Amie Mtz shared that their team is working with pt to develop more effective communication and would prefer to not have a third party involved in finding out pt's appointment times. RAIN HERNANDEZ agreed that they are pt's [address] [place] [address] team and appreciated Etta's feedback. RAIN HERNANDEZ asked how to best support pt and Amie Mtz suggested encouraging pt to call [facility] and ask to speak with staff, Barrett Nogueira, to find out her appointment time. Spoke with pt and shared that she can call [facility] and speak with staff person, Barrett Nogueira, to ask for her appointment time. Pt stated she is not sure that she will do that. RAIN HERNANDEZ offered supportive listening, but encouraged pt to call, or wait for [facility] to call her, so she can be prepared for tomorrow. RAIN HERNANDEZ will follow pt in surveillance d/t no care plan at this time. Will remain available should any needs arise. Note routed to RN CM.     Follow-up Schedule: Surveillance

## 2023-11-21 NOTE — TELEPHONE ENCOUNTER
Spoke with Chau, reviewed she should hold off on the tattoo for now. Patient agreeable. She was hospitalized for two weeks and is currently on Coumadin 5 mg daily. Advised her to get her PT/INR tomorrow (every Wednesday) as we had discussed. I will see her back in the office on 11/28. Patient agreeable. 0

## 2023-11-21 NOTE — TELEPHONE ENCOUNTER
Patient Call    Who are you speaking with? Patient    If it is not the patient, are they listed on an active communication consent form? N/A   What is the reason for this call? Patient calling in regards to her recent hospitalization and whether or not she needs to have labs drawn tomorrow, 11/22/23. Patient has labs drawn on every Wednesday before she was in the hospital. Patient has labs done on 11/19/23 and wanted to know if she still needed her labs done on 11/22/23 still. Patient would like a call back in regards to this. Does this require a call back? Yes   If a call back is required, please list best call back number [phone]   If a call back is required, advise that a message will be forwarded to their care team and someone will return their call as soon as possible. Did you relay this information to the patient?  Yes

## 2023-11-21 NOTE — DISCHARGE SUMMARY
Discharge Summary - [address] 47 y.o. female MRN: [id number]  Unit/Bed#: [facility] 311-44 Encounter: [id number]     Admission Date:   Admission Orders (From admission, onward)          Ordered         11/10/23 2045   ED TO DIFFERENT CAMPUS [facility] UNIT or INPATIENT MEDICAL UNIT to [facility] UNIT (using Discharge Readmit Navigator) - Admit Patient to [facility] Unit  Once                                     Discharge Date: 11/17/23      Attending Psychiatrist: Spencer Hernadez MD      Admission Diagnosis:     Principal Problem:    Schizoaffective disorder, bipolar type ([facility])  Active Problems:    Benign essential hypertension    Edema    Hypothyroidism    Overactive bladder    Sjogren's syndrome (HCC)    COPD (chronic obstructive pulmonary disease) ([facility])    Medical clearance for psychiatric admission    History of pulmonary embolism    Grief        Discharge Diagnosis:     Principal Problem:    Schizoaffective disorder, bipolar type ([address] [facility])  Active Problems:    Benign essential hypertension    Edema    Hypothyroidism    Overactive bladder    Sjogren's syndrome (HCC)    COPD (chronic obstructive pulmonary disease) ([facility])    Medical clearance for psychiatric admission    History of pulmonary embolism    Grief  Resolved Problems:    * No resolved hospital problems. *        Reason for Admission/HPI:      As per Dr. Neris Jeffrey:      Zulma Tate is a 47 y.o. female with a history of Schizoaffective Disorder and personality disorder who was admitted to the inpatient older adult psychiatric unit on a involuntary 303 commitment basis due to depression and suicide attempt by overdose on 40 tablets of Flexaril . Mert Fontenot overdosed on 11/06 and was admitted to [facility] for evaluation. She was most recently hospitalized in [facility] older adult [address] from 09/29 to 10/06. She was seen by Dr. Bladimir Pool for consultation at [facility]) and followed during her admission.  Per his last note before she was discharged from [facility]) medical floor: "I came to see the patient for continuity of care. Per staff patient attempted to elope again yesterday. Patient initially was not verbally answering questions, her eyes were downcast.  She reports being upset regarding being hospitalized. She was tearful during the evaluation. She verbalized apathetic statements such as "fuck it". She was perseverative regarding returning home and did not appear to acknowledge the seriousness of her overdose. She reports adequate sleep and appetite. She denies current suicidal or homicidal ideation, plan, or intent. When assessing for hallucinations patient stated that "the other night" she felt that she was "talking a lot when falling asleep". She denies auditory or visual hallucinations. As interview progressed patient became somewhat more engaged and cooperative. When asked about her home Depakote regimen patient states that she does not remember her dosages. States that she was taking "1 to 2" tablets of Depakote at night and does not remember how much she was taking in the morning. Reports that she had been experiencing tremors due to the Depakote and states that the plan by her psychiatrist was to taper off this medication and onto a different mood stabilizer. She reports having experienced tremors in her hands and legs and states that she has been experiencing those tremors today. States that she does not want to take Depakote anymore. She reports feeling depressed. She reports that she is interested in medication change to improve her condition."   Addendum at 1:04 pm 11/10: "Received [employer] message from primary team stating that patient tried to hurt herself and was banging her arms on the wall, control team was called and patient was given Ativan prn. We discussed need for improved mood stabilization and treatment of agitation.   Due to this recent episode of agitation, impulsivity, and unstable mood discussed recommendation to start oxcarbazepine 150 mg BID for mood stabilization and to order prn medications for agitation."  Patient was transferred to this unit on latuda 60mg daily, strattera 40mg daily, wellbutrin Xl 300mg daily, topamax 200mg BID and trileptal 150mg BID. On evaluation in the inpatient psychiatric unit Alyse Gordillo states she is feeling "okay" and is hoping for discharge soon. States that she regrets her suicide attempt. States that she does not feel as depressed, and admits that she had been feeling depressed and more anhedonic before her suicide attempt. Admits that "it was a mistake ". States she has been feeling more depressed over the past 2 years after her mother . She states that her mother was on hospice, and she was providing support for her before she passed. States that she has been hospitalized several times since then, most recently about a month ago. Admits that she does want to get better, is hopeful that this medication change will help. States that she is already noted that she is coloring more and feels more motivated. States that she wants to go back to work, and wants to go home and see her pet cat. States that she also has to be there for her father. She denies any side effects from her medication changed to Trileptal, and is hopeful this will help reduce her tremors. She states she would like to restart her Strattera, she states that this is helping her following her relapse on methamphetamines. 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 She denies current thoughts to herself or anyone else, denies any hallucinations. She states that she does want to see how she responds to treatment, and is going to continue coloring here on the unit to help relax.      Medical History        Past Medical History:   Diagnosis Date    Anxiety      Arthritis       oa cassandra knees    Asthma       good control- no medications    Yan's esophagus      Bipolar affective disorder ([address])      Cannabis abuse with unspecified 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[address])      Chronic pain disorder       lumbar    COPD (chronic obstructive pulmonary disease) (HCC)      CPAP (continuous positive airway pressure) dependence      Degenerative disc disease at L5-S1 level      Deliberate self-cutting       9/15/22per pt has not done recently-- does see a therapist and psychiatrist regularly    Depression 09/16/2008    Disease of thyroid gland       hypo    MARTINEZ (dyspnea on exertion)       per pt "has had this with exertion and not new"    Drug overdose 10/28/2008     suicide attempt and again drug overdose 7/2022-- [facility]-Medical floor and than transferred to [facility] Psych    Dysphagia      Dyspnea      Edema       BLE    Family history of blood clots      GERD (gastroesophageal reflux disease)      Headache(784.0)      Headache, tension-type      Hemorrhage of rectum and anus 10/02/2017     Formatting of this note might be different from the original.  Added automatically from request for surgery [id number]    High blood pressure      History of COVID-19 12/30/2020     Symptoms started on 12/30/2020 and then got worse. Today she is feeling a little bit better.   She is a candidate for monoclonal antibody infusion and set for 1/6/21 @ 1pm at [facility]. 01/07/21 - telemedicine -     Hyperlipidemia      Hypertension      Knee pain, bilateral       Especially right    Medical marijuana use      Memory loss      Migraines      Obesity      Overactive bladder      Pulmonary emboli (HCC)      Sjogren's disease ([address])      Sleep apnea      Stress incontinence      Suicidal ideations      Teeth missing      Tremor       per pt Tremors of Right Leg and both Arms    Visual impairment      Wears glasses           Surgical History         Past Surgical History:   Procedure Laterality Date    BREAST CYST EXCISION Right 1989    CARPAL TUNNEL RELEASE Left      CHOLECYSTECTOMY   05/2003     Laparoscopic COLONOSCOPY         01/12/2009    DILATION AND CURETTAGE OF UTERUS        ELBOW SURGERY Left       x2. No hardware    ESOPHAGOGASTRODUODENOSCOPY        FOOT SURGERY Left       Plantar fasciotomy    HERNIA REPAIR        HYSTERECTOMY         laporoscopic, partial    KNEE ARTHROSCOPY Bilateral      OOPHORECTOMY Left 10/2015    GA GASTROCNEMIUS RECESSION Left 02/24/2021     Procedure: RECESSION GASTROC OPEN;  Surgeon: Quinn Schwab, DPM;  Location: [address] [place] [address] MAIN OR;  Service: Podiatry    GA RPR UMBILICAL HRNA 5 YRS/> REDUCIBLE N/A 04/24/2019     Procedure: REPAIR HERNIA UMBILICAL LAPAROSCOPIC W/ ROBOTICS;  Surgeon: Nuvia Raygoza MD;  Location: [facility] Main OR;  Service: General    GA SPHINCTEROTOMY ANAL DIVISION SPHINCTER [facility] N/A 08/31/2018     Procedure: EUA, LEFT PARTIAL INTERNAL SPHINCTEROTOMY;  Surgeon: Pelon Lobo MD;  Location: [address] [place] [address] MAIN OR;  Service: Colorectal    GA TNOT ELBOW LATERAL/MEDIAL DEBRIDE OPEN TDN RPR Left 09/20/2022     Procedure: RELEASE EPICONDYLAR ELBOW MEDIAL;  Surgeon: Obdulio Dos Santos MD;  Location: [facility] MAIN OR;  Service: Orthopedics    REDUCTION MAMMAPLASTY Bilateral 1999    REPAIR LACERATION Left       left hand-5/18/2009    REPLACEMENT TOTAL KNEE Right      ROTATOR CUFF REPAIR Left      TONSILECTOMY AND ADNOIDECTOMY        US GUIDED MSK PROCEDURE   04/22/2021    VASCULAR SURGERY        VEIN LIGATION Bilateral      WISDOM TOOTH EXTRACTION                Medications: All current active medications have been reviewed. Medications prior to admission:    Prior to Admission Medications   Prescriptions Last Dose Informant Patient Reported? Taking?    Diclofenac Sodium (VOLTAREN) 1 % Not Taking Self No No   Sig: Apply 2 g topically 4 (four) times a day   Patient not taking: Reported on 10/24/2023   RABEprazole (ACIPHEX) 20 MG tablet Past Week Self Yes Yes   Sig: Take 20 mg by mouth 2 (two) times a day   albuterol (Ventolin HFA) 90 mcg/act inhaler Past Month Self No Yes   Sig: Inhale 2 puffs every 6 (six) hours as needed for wheezing   amLODIPine (NORVASC) 5 mg tablet 11/10/2023 Self No Yes   Sig: TAKE 1 TABLET (5 MG TOTAL) BY MOUTH DAILY   atoMOXetine (STRATTERA) 40 mg capsule 11/10/2023 Self Yes Yes   buPROPion (WELLBUTRIN XL) 300 mg 24 hr tablet 11/10/2023 Self No Yes   Sig: Take 1 tablet (300 mg total) by mouth daily   cholecalciferol (VITAMIN D3) 1,000 units tablet 11/10/2023 Self No Yes   Sig: Take 1 tablet (1,000 Units total) by mouth daily   cloNIDine (CATAPRES) 0.1 mg tablet 11/10/2023 Self No Yes   Sig: Take 1 tablet (0.1 mg total) by mouth every 12 (twelve) hours   divalproex sodium (DEPAKOTE) 250 mg DR tablet 11/10/2023 Self No Yes   Sig: Take 3 tablets (750 mg total) by mouth in the morning   divalproex sodium (DEPAKOTE) 500 mg DR tablet Past Week Self No Yes   Sig: Take 2 tablets (1,000 mg total) by mouth daily at bedtime   estradiol (ESTRACE) 0.5 MG tablet Not Taking Self No No   Sig: Take 1 tablet (0.5 mg total) by mouth daily   Patient not taking: Reported on 11/10/2023   furosemide (LASIX) 40 mg tablet 11/10/2023   No Yes   Sig: Take 1 tablet (40 mg total) by mouth daily   haloperidol decanoate (Haldol Decanoate) 50 mg/mL injection Past Week Self Yes Yes   Sig: every 30 (thirty) days   hydrocortisone 2.5 % ointment   Self No No   Sig: Apply topically 2 (two) times a day   Patient not taking: Reported on 11/3/2023   levothyroxine (Euthyrox) 125 mcg tablet 11/10/2023 Self No Yes   Sig: Take 1 tablet (125 mcg total) by mouth daily in the early morning   lidocaine (LIDODERM) 5 % Not Taking Self No No   Sig: Apply 1 patch topically over 12 hours daily Remove & Discard patch within 12 hours or as directed by MD   Patient not taking: Reported on 11/10/2023   losartan (COZAAR) 50 mg tablet 11/10/2023 Self No Yes   Sig: TAKE ONE TABLET BY MOUTH DAILY   lurasidone (LATUDA) 40 mg tablet 11/10/2023 Self No Yes   Sig: Take 1 tablet (40 mg total) by mouth daily with breakfast   lurasidone (LATUDA) 40 mg tablet 11/10/2023 Self No Yes   Sig: Take 1 tablet (40 mg total) by mouth daily with dinner   memantine (NAMENDA) 10 mg tablet   Self No No   Sig: Take 1 tablet (10 mg total) by mouth daily   metoprolol tartrate (LOPRESSOR) 25 mg tablet 11/10/2023   No Yes   Sig: TAKE ONE TABLET BY MOUTH EVERY 12 HOURS   naltrexone (REVIA) 50 mg tablet 11/10/2023 Self No Yes   Sig: Take 1 tablet (50 mg total) by mouth daily   nystatin (MYCOSTATIN) powder 11/10/2023   No Yes   Sig: Apply topically 4 (four) times a day for 5 days   oxybutynin (DITROPAN) 5 mg tablet   Self No No   Sig: Take 1 tablet (5 mg total) by mouth 2 (two) times a day   pantoprazole (PROTONIX) 20 mg tablet 11/10/2023 Self No Yes   Sig: Take 1 tablet (20 mg total) by mouth daily in the early morning   pilocarpine (SALAGEN) 5 mg tablet Past Week Self Yes Yes   Sig: Take 5 mg by mouth 2 (two) times a day   rOPINIRole (REQUIP) 1 mg tablet Past Week Self Yes Yes   Si mg   topiramate (TOPAMAX) 200 MG tablet 11/10/2023 Self No Yes   Sig: Take 1 tablet (200 mg total) by mouth 2 (two) times a day   trospium chloride (SANCTURA) 20 mg tablet Past Week Self Yes Yes   Sig: Take 20 mg by mouth 2 (two) times a day   warfarin (COUMADIN) 4 mg tablet 2023 Self No Yes   Sig: Take 1 tablet (4 mg total) by mouth daily   warfarin (Coumadin) 2 mg tablet     No No   Sig: Take as directed      Facility-Administered Medications: None         Allergies: Allergies   Allergen Reactions    Percocet [Oxycodone-Acetaminophen] Headache       Severe headaches   (denies issues with Tylenol)    Povidone Iodine Rash       Unsure if betadine or gauze post surgical. Got rash on leg. Has  Had itchy rashes after every surgery prep and IV insertion    Medical Tape Hives    Chlorhexidine Rash       Per pt "able to use the liquid soap--thinkd reaction from the sponges or wipes"         Please refer to the initial H&P for full details.         Vital signs in last 24 hours:     Temp: [97 °F (36.1 °C)-97.8 °F (36.6 °C)] 97.1 °F (36.2 °C)  HR:  [80-87] 80  Resp:  [16-18] (P) 18  BP: (129-167)/(77-97) 129/77        Intake/Output Summary (Last 24 hours) at 11/17/2023 1031  Last data filed at 11/17/2023 0837      Gross per 24 hour   Intake 1420 ml   Output --   Net 1420 ml            Hospital Course: On admission, Gayle Sorto was admitted to the inpatient psychiatric unit and started on Behavioral Health checks every 7 minutes. During the hospitalization she was encouraged to attend individual therapy, group therapy, milieu therapy and occupational therapy. Upon admission she was seen by medical service for medical clearance for inpatient treatment and medical follow up. Admitted on November 11, 2023 secondary to increase in depression as well as suicide attempt by overdose of Flexeril. Patient transferred from [facility] after medical stabilization. Home dose of Depakote transition to Trileptal on medical unit. Home medications as well as PDMP reviewed. Patient continued on Latuda 60 mg daily for mood stabilization, Strattera 40 mg daily for ADHD, Wellbutrin  mg daily for depressive symptoms, Topamax 200 mg twice daily for mood stabilization and history of seizures, clonidine 0.1 mg twice daily for ADHD/impulsivity and Trileptal 150 mg twice daily for mood. Patient also noted to have monthly dose of Haldol DEC on 10/25/2023, with next dose to be received on 11/22/2023. Patient condition continued to improve, with Gayle Sorto noted to be participating well on the unit including attending groups. Discharged on 2 antipsychotics secondary to multiple failures of monotherapy, patient does not exhibit nor express adverse effects. In agreement with discharge today, patient continues to express positive outlook. Plan if feeling unsafe discussed with patient in agreement to reach out to ACT team, crisis hotline or established peer group. Protective factors also stated as her family and her cat.   Gayle Sorto expresses no thoughts of SI/HI. No access to guns. No AVH. We will continue with current plan of care including discharge today, with follow-up with ACT team.      Blanca's medications were titrated as appropriate until discharge, including:     Strattera 40 mg daily for ADHD  Wellbutrin 300 mg daily for depressive symptoms  Clonidine 0.1 mg twice daily for ADHD and impulsivity  Haldol DEC 50 mg q. 28 days for schizoaffective disorder; next dose to be administered on 11/22/2023  Latuda 60 mg daily for mood adjunct and schizoaffective disorder  Melatonin 3 mg nightly for insomnia   naltrexone 50 mg daily for impulsivity   Trileptal 300 mg twice dailyfor mood stabilization  Topamax 200 mg twice daily for mood instability and history of seizures     Prior to beginning of treatment medications risks and benefits and possible side effects including risk of liver impairment and agranulocytosis related to treatment with Tegretol, risk of hyponatremia related to treatment with Trileptal, risk of parkinsonian symptoms, Tardive Dyskinesia and metabolic syndrome related to treatment with antipsychotic medications, risk of cardiovascular events in elderly related to treatment with antipsychotic medications, and risk of suicidality and serotonin syndrome related to treatment with antidepressants were reviewed with Ignacio Knapp. Ignacio Knapp verbalized understanding and agreement for treatment. Blanca tolerated these medications with no acute side effects. The patient's mood brightened over the course of treatment, and she was seen in [facility] interacting appropriately with peers. Ignacio Knapp did not demonstrate dangerous behavior to self or others during her inpatient stay. On the day of discharge, she denied suicidal ideation, intent or plan at the time of discharge and denied homicidal ideation, intent or plan at the time of discharge. she was participating appropriately in milieu at the time of discharge.  Behavior was appropriate on the unit at the time of discharge. Sleep and appetite were improved. Since she was doing well at the end of the hospitalization, treatment team felt that she could be safely discharged to outpatient care. The outpatient follow up with [facility] was arranged by the unit  upon discharge. I reviewed with Teresa Quispe the importance of compliance with medications and outpatient treatment after discharge., I discussed the medication regimen and possible side effects of the medications with Teresa Quispe prior to discharge. At the time of discharge she was tolerating psychiatric medications. , I discussed outpatient follow up with Teresa Quispe., I reviewed with Teresa Quispe crisis plan and safety plan upon discharge., Teresa Quispe was competent to understand risks and benefits of withholding information and risks and benefits of her actions. , and Teresa Quispe agreed to abstain from drug and alcohol use after discharge. The patient understands the importance of taking their medications and attending their outpatient appointments. The patient knows that if there are concerns for safety to utilize their coping skills and can call the suicide hotline as well as 911 if there are concerns for safety. Behavioral Health Medications: all current active meds have been reviewed and continue current psychiatric medications. Discharge on Two Antipsychotic Medications: Yes - Teresa Quispe is being discharged on 2 antipsychotic agents (Latuda and Haldol Decanoate) due to the history of at least 3 antipsychotic medication trials and failure of multiple trials of monotherapy. Labs/Imaging:   I have personally reviewed all pertinent laboratory/tests results.   Most Recent Labs:         Lab Results   Component Value Date     WBC 8.87 11/11/2023     RBC 4.64 11/11/2023     HGB 13.9 11/11/2023     HCT 43.4 11/11/2023      11/11/2023     RDW 14.0 11/11/2023     TOTANEUTABS 4.34 10/15/2023     NEUTROABS 5.75 11/11/2023     SODIUM 140 11/11/2023     K 3.8 11/11/2023      11/11/2023     CO2 24 11/11/2023     BUN 18 11/11/2023     CREATININE 0.61 11/11/2023     GLUC 96 11/11/2023     GLUF 96 11/11/2023     CALCIUM 9.0 11/11/2023     AST 13 11/11/2023     ALT 12 11/11/2023     ALKPHOS 48 11/11/2023     TP 6.3 (L) 11/11/2023     ALB 3.6 11/11/2023     TBILI 0.29 11/11/2023     CHOLESTEROL 234 (H) 03/09/2023     HDL 71 03/09/2023     TRIG 153 (H) 03/09/2023     LDLCALC 132 (H) 03/09/2023     NONHDLC 163 03/09/2023     VALPROICTOT 23 (L) 11/10/2023     AMMONIA 63 11/06/2023     JXX9DQDMOSJM 2.445 11/11/2023     FREET4 0.81 11/06/2023     T3FREE 2.27 (L) 10/16/2019     PREGUR negative 07/13/2022     PREGSERUM Negative 11/06/2023     RPR Non-Reactive 07/17/2022     HGBA1C 5.0 01/05/2023     EAG 97 01/05/2023         Mental Status at time of Discharge:   Appearance:  age appropriate, dressed appropriately, looks stated age, sitting comfortably in chair, adequate hygiene and grooming, cooperative with interview, good eye contact    Behavior:  cooperative   Speech:  normal rate, normal volume, normal pitch, fluent, clear, and coherent   Mood:  "good"   Affect:  mood-congruent   Language Within normal limits   Thought Process:  organized, logical, goal directed, normal rate of thoughts   Associations: intact associations      Thought Content:  No verbalized delusions   Perceptual Disturbances: Denies auditory or visual hallucinations and Does not appear to be responding to internal stimuli   Risk Potential: Denies suicidal or homicidal ideation, plan, or intent   Sensorium:  person, place, time, and current situation   Cognition:  Grossly intact   Consciousness:  alert and awake   Attention: attention span and concentration were age appropriate   Insight:  fair   Judgment: fair   Intellect appears to be of average intelligence   Gait/Station: normal gait/station   Motor Activity: no abnormal movements      Suicide/Homicide Risk Assessment:  Risk of Harm to Self:   The following ratings are based on assessment at the time of discharge  Demographic risk factors include: , age: over 48 or older  Historical Risk Factors include: none  Current Specific Risk Factors include: recent inpatient psychiatric admission - being discharged today  Protective Factors: no current suicidal ideation  Weapons/Firearms: none. The following steps have been taken to ensure weapons are properly secured: not applicable  Based on today's assessment, Keren Ash presents the following risk of harm to self: minimal     Risk of Harm to Others: The following ratings are based on assessment at the time of discharge  Demographic Risk Factors include: none. Historical Risk Factors include: none. Current Specific Risk Factors include: none  Protective Factors: no current homicidal ideation  Weapons/Firearms: none. The following steps have been taken to ensure weapons are properly secured: not applicable  Based on today's assessment, Keren Ash presents the following risk of harm to others: minimal     The following interventions are recommended: outpatient follow up with a psychiatrist, outpatient follow up with [facility] (ACT)     Discharge Medications:  See list above, as well as the after visit summary for all reconciled discharge medications provided to patient and family. Discharge instructions/Information to patient and family:   See after visit summary for information provided to patient and family. Provisions for Follow-Up Care:  See after visit summary for information related to follow-up care and any pertinent home health orders. JHONATAN Murrell 11/17/23        This note was completed in part utilizing Dragon dictation Software.  Grammatical, translation, syntax errors, random word insertions, spelling mistakes, and incomplete sentences may be an occasional consequence of this system secondary to software limitations with voice recognition, ambient noise, and hardware issues. If you have any questions or concerns about the content, text, or information contained within the body of this dictation, please contact the provider for clarification.

## 2023-11-21 NOTE — PRE-PROCEDURE INSTRUCTIONS
Pre-Surgery Instructions:   Medication Instructions    atoMOXetine (STRATTERA) 40 mg capsule     buPROPion (WELLBUTRIN XL) 300 mg 24 hr tablet     cholecalciferol (VITAMIN D3) 1,000 units tablet     cloNIDine (CATAPRES) 0.1 mg tablet     furosemide (LASIX) 20 mg tablet     [START ON 11/22/2023] haloperidol decanoate (Haldol Decanoate) 50 mg/mL injection     levothyroxine (Euthyrox) 125 mcg tablet     losartan (COZAAR) 100 MG tablet     lurasidone 60 MG TABS     melatonin 3 mg     naltrexone (REVIA) 50 mg tablet     nystatin (MYCOSTATIN) powder     OXcarbazepine (TRILEPTAL) 300 mg tablet     pantoprazole (PROTONIX) 20 mg tablet     rOPINIRole (REQUIP) 1 mg tablet     topiramate (TOPAMAX) 200 MG tablet     traZODone (DESYREL) 100 mg tablet     trospium chloride (SANCTURA) 20 mg tablet     warfarin (COUMADIN) 5 mg tablet       PER PATIENT SHE WISHES TO HOLD ALL MEDICATIONS UNTIL AFTER THE MRI     The patient should have nothing to eat or drink after 11 pm the night before the MRI. The patient may take their medications with a sip of water at least 2 hours prior to their arrival time. You will receive a call the evening before your MRI appointment with additional instructions. Please leave your jewelry and valuables at home, wedding rings are the exception. Please bring your physician order, insurance cards, a form of photo ID and a list of your medications with you. Arrive 15 minutes prior to your appointment time in order to register. Please bring any prior CT or MRI studies of this area that were not performed at a [facility].

## 2023-11-22 ENCOUNTER — HOSPITAL ENCOUNTER (EMERGENCY)
Facility: HOSPITAL | Age: 53
Discharge: HOME | End: 2023-11-22
Attending: EMERGENCY MEDICINE
Payer: MEDICARE

## 2023-11-22 ENCOUNTER — APPOINTMENT (EMERGENCY)
Dept: RADIOLOGY | Facility: HOSPITAL | Age: 53
End: 2023-11-22
Payer: MEDICARE

## 2023-11-22 ENCOUNTER — APPOINTMENT (EMERGENCY)
Dept: CT IMAGING | Facility: HOSPITAL | Age: 53
End: 2023-11-22
Payer: MEDICARE

## 2023-11-22 ENCOUNTER — PATIENT OUTREACH (OUTPATIENT)
Dept: CASE MANAGEMENT | Facility: OTHER | Age: 53
End: 2023-11-22

## 2023-11-22 ENCOUNTER — TELEPHONE (OUTPATIENT)
Dept: HEMATOLOGY ONCOLOGY | Facility: CLINIC | Age: 53
End: 2023-11-22

## 2023-11-22 VITALS
HEART RATE: 86 BPM | RESPIRATION RATE: 14 BRPM | DIASTOLIC BLOOD PRESSURE: 84 MMHG | SYSTOLIC BLOOD PRESSURE: 159 MMHG | OXYGEN SATURATION: 92 % | TEMPERATURE: 98.3 F

## 2023-11-22 DIAGNOSIS — R07.9 CHEST PAIN IN ADULT: Primary | ICD-10-CM

## 2023-11-22 LAB
2HR DELTA HS TROPONIN: 1 NG/L
ALBUMIN SERPL BCP-MCNC: 4.1 G/DL (ref 3.5–5)
ALP SERPL-CCNC: 61 U/L (ref 34–104)
ALT SERPL W P-5'-P-CCNC: 20 U/L (ref 7–52)
ANION GAP SERPL CALCULATED.3IONS-SCNC: 11 MMOL/L
APTT PPP: 41 SECONDS (ref 23–37)
AST SERPL W P-5'-P-CCNC: 15 U/L (ref 13–39)
ATRIAL RATE: 85 BPM
BASOPHILS # BLD AUTO: 0.02 THOUSANDS/ÂΜL (ref 0–0.1)
BASOPHILS NFR BLD AUTO: 0 % (ref 0–1)
BILIRUB SERPL-MCNC: 0.2 MG/DL (ref 0.2–1)
BNP SERPL-MCNC: 40 PG/ML (ref 0–100)
BUN SERPL-MCNC: 11 MG/DL (ref 5–25)
CALCIUM SERPL-MCNC: 9.3 MG/DL (ref 8.4–10.2)
CARDIAC TROPONIN I PNL SERPL HS: 4 NG/L
CARDIAC TROPONIN I PNL SERPL HS: 5 NG/L
CHLORIDE SERPL-SCNC: 108 MMOL/L (ref 96–108)
CO2 SERPL-SCNC: 18 MMOL/L (ref 21–32)
CREAT SERPL-MCNC: 0.6 MG/DL (ref 0.6–1.3)
EOSINOPHIL # BLD AUTO: 0 THOUSAND/ÂΜL (ref 0–0.61)
EOSINOPHIL NFR BLD AUTO: 0 % (ref 0–6)
ERYTHROCYTE [DISTWIDTH] IN BLOOD BY AUTOMATED COUNT: 13.7 % (ref 11.6–15.1)
GFR SERPL CREATININE-BSD FRML MDRD: 105 ML/MIN/1.73SQ M
GLUCOSE SERPL-MCNC: 126 MG/DL (ref 65–140)
HCT VFR BLD AUTO: 47.2 % (ref 34.8–46.1)
HGB BLD-MCNC: 14.7 G/DL (ref 11.5–15.4)
IMM GRANULOCYTES # BLD AUTO: 0.23 THOUSAND/UL (ref 0–0.2)
IMM GRANULOCYTES NFR BLD AUTO: 2 % (ref 0–2)
INR PPP: 2.21 (ref 0.84–1.19)
LYMPHOCYTES # BLD AUTO: 1.09 THOUSANDS/ÂΜL (ref 0.6–4.47)
LYMPHOCYTES NFR BLD AUTO: 9 % (ref 14–44)
MAGNESIUM SERPL-MCNC: 2.2 MG/DL (ref 1.9–2.7)
MCH RBC QN AUTO: 30.1 PG (ref 26.8–34.3)
MCHC RBC AUTO-ENTMCNC: 31.1 G/DL (ref 31.4–37.4)
MCV RBC AUTO: 97 FL (ref 82–98)
MONOCYTES # BLD AUTO: 0.32 THOUSAND/ÂΜL (ref 0.17–1.22)
MONOCYTES NFR BLD AUTO: 3 % (ref 4–12)
NEUTROPHILS # BLD AUTO: 9.99 THOUSANDS/ÂΜL (ref 1.85–7.62)
NEUTS SEG NFR BLD AUTO: 86 % (ref 43–75)
NRBC BLD AUTO-RTO: 0 /100 WBCS
P AXIS: 54 DEGREES
PLATELET # BLD AUTO: 189 THOUSANDS/UL (ref 149–390)
PMV BLD AUTO: 10.7 FL (ref 8.9–12.7)
POTASSIUM SERPL-SCNC: 4.1 MMOL/L (ref 3.5–5.3)
PR INTERVAL: 200 MS
PROT SERPL-MCNC: 7.6 G/DL (ref 6.4–8.4)
PROTHROMBIN TIME: 24.2 SECONDS (ref 11.6–14.5)
QRS AXIS: -40 DEGREES
QRSD INTERVAL: 104 MS
QT INTERVAL: 360 MS
QTC INTERVAL: 428 MS
RBC # BLD AUTO: 4.89 MILLION/UL (ref 3.81–5.12)
SODIUM SERPL-SCNC: 137 MMOL/L (ref 135–147)
T WAVE AXIS: 59 DEGREES
VENTRICULAR RATE: 85 BPM
WBC # BLD AUTO: 11.65 THOUSAND/UL (ref 4.31–10.16)

## 2023-11-22 PROCEDURE — 71045 X-RAY EXAM CHEST 1 VIEW: CPT

## 2023-11-22 PROCEDURE — 93010 ELECTROCARDIOGRAM REPORT: CPT | Performed by: INTERNAL MEDICINE

## 2023-11-22 PROCEDURE — 71275 CT ANGIOGRAPHY CHEST: CPT

## 2023-11-22 PROCEDURE — 99285 EMERGENCY DEPT VISIT HI MDM: CPT | Performed by: EMERGENCY MEDICINE

## 2023-11-22 PROCEDURE — 85025 COMPLETE CBC W/AUTO DIFF WBC: CPT | Performed by: EMERGENCY MEDICINE

## 2023-11-22 PROCEDURE — 96361 HYDRATE IV INFUSION ADD-ON: CPT

## 2023-11-22 PROCEDURE — 36415 COLL VENOUS BLD VENIPUNCTURE: CPT | Performed by: EMERGENCY MEDICINE

## 2023-11-22 PROCEDURE — 85730 THROMBOPLASTIN TIME PARTIAL: CPT | Performed by: EMERGENCY MEDICINE

## 2023-11-22 PROCEDURE — 84484 ASSAY OF TROPONIN QUANT: CPT | Performed by: EMERGENCY MEDICINE

## 2023-11-22 PROCEDURE — 83880 ASSAY OF NATRIURETIC PEPTIDE: CPT | Performed by: EMERGENCY MEDICINE

## 2023-11-22 PROCEDURE — 99285 EMERGENCY DEPT VISIT HI MDM: CPT

## 2023-11-22 PROCEDURE — 85610 PROTHROMBIN TIME: CPT | Performed by: EMERGENCY MEDICINE

## 2023-11-22 PROCEDURE — 83735 ASSAY OF MAGNESIUM: CPT | Performed by: EMERGENCY MEDICINE

## 2023-11-22 PROCEDURE — 96374 THER/PROPH/DIAG INJ IV PUSH: CPT

## 2023-11-22 PROCEDURE — 93005 ELECTROCARDIOGRAM TRACING: CPT

## 2023-11-22 PROCEDURE — 80053 COMPREHEN METABOLIC PANEL: CPT | Performed by: EMERGENCY MEDICINE

## 2023-11-22 RX ORDER — ACETAMINOPHEN 325 MG/1
975 TABLET ORAL ONCE
Status: COMPLETED | OUTPATIENT
Start: 2023-11-22 | End: 2023-11-22

## 2023-11-22 RX ORDER — LORAZEPAM 1 MG/1
1 TABLET ORAL ONCE
Status: COMPLETED | OUTPATIENT
Start: 2023-11-22 | End: 2023-11-22

## 2023-11-22 RX ORDER — KETOROLAC TROMETHAMINE 30 MG/ML
15 INJECTION, SOLUTION INTRAMUSCULAR; INTRAVENOUS ONCE
Status: COMPLETED | OUTPATIENT
Start: 2023-11-22 | End: 2023-11-22

## 2023-11-22 RX ADMIN — ACETAMINOPHEN 975 MG: 325 TABLET, FILM COATED ORAL at 04:08

## 2023-11-22 RX ADMIN — SODIUM CHLORIDE 1000 ML: 0.9 INJECTION, SOLUTION INTRAVENOUS at 05:55

## 2023-11-22 RX ADMIN — KETOROLAC TROMETHAMINE 15 MG: 30 INJECTION INTRAMUSCULAR; INTRAVENOUS at 05:55

## 2023-11-22 RX ADMIN — LORAZEPAM 1 MG: 1 TABLET ORAL at 06:42

## 2023-11-22 RX ADMIN — IOHEXOL 100 ML: 350 INJECTION, SOLUTION INTRAVENOUS at 05:21

## 2023-11-22 RX ADMIN — SODIUM CHLORIDE 1000 ML: 0.9 INJECTION, SOLUTION INTRAVENOUS at 04:14

## 2023-11-22 NOTE — TELEPHONE ENCOUNTER
Spoke with patient. She was in the hospital this morning regarding chest pain. States she is feeling better. PT/INR was done at the hospital, 2.21. Informed her to continue taking Coumadin 5 mg daily and have her recheck her INR next Wednesday. Patient voiced understanding. She is aware to contact us for any additional questions/concerns.

## 2023-11-22 NOTE — DISCHARGE INSTRUCTIONS
Follow up with your primary doctor/outpatient providers, and return to the emergency department for new or worsening symptoms. CTA - CHEST WITH IV CONTRAST - PULMONARY ANGIOGRAM     INDICATION:   chest pain, dyspnea. COMPARISON: 9/20/2023. TECHNIQUE: CTA examination of the chest was performed using angiographic technique according to a protocol specifically tailored to evaluate for pulmonary embolism. Multiplanar 2D reformatted images were created from the source data. In addition,   coronal 3D MIP postprocessing was performed on the acquisition scanner. Radiation dose length product (DLP) for this visit:  760.6 mGy-cm . This examination, like all CT scans performed in the [facility], was performed utilizing techniques to minimize radiation dose exposure, including the use of iterative   reconstruction and automated exposure control. IV Contrast:  100 mL of iohexol (OMNIPAQUE)     FINDINGS:     Exam limited by mild motion artifact. PULMONARY ARTERIAL TREE:  No pulmonary embolism is seen to the segmental level with evaluation of the more peripheral subsegmental pulmonary arteries limited by timing of contrast bolus and mild motion artifact. LUNGS: Central airways are patent. There is no tracheal or endobronchial lesion. No lobar airspace consolidation no suspicious pulmonary parenchymal mass. Mild bibasilar dependent atelectasis. PLEURA:  Unremarkable without pneumothorax or pleural effusions. HEART/GREAT VESSELS: Heart is normal in size. No pericardial effusion. No thoracic aortic aneurysm or dissection. Visualized proximal thoracic great vessels are patent with normal course and caliber. MEDIASTINUM AND RUEL:  Unremarkable without mass or lymphadenopathy. Visualized thyroid glands appear grossly unremarkable. Esophagus is normal in course and caliber. No significant prevertebral soft tissue swelling or mass noted. .     CHEST WALL AND LOWER NECK: Unremarkable. VISUALIZED STRUCTURES IN THE UPPER ABDOMEN: No free air or free fluid. Hepatomegaly noted. Gallbladder is surgically absent. Visualized portions of the spleen, pancreas, adrenal glands, stomach, and upper abdominal bowel loops are grossly unremarkable. .     OSSEOUS STRUCTURES:  No acute fracture or destructive osseous lesion. Mild multilevel degenerative changes of the thoracic spine. IMPRESSION:     1. No pulmonary embolism is seen to the segmental level with evaluation of the more peripheral subsegmental pulmonary arteries limited by timing of contrast bolus and mild motion artifact. 2.  No thoracic aortic aneurysm or dissection. 3.  No acute intrathoracic abnormalities with ancillary findings detailed above.

## 2023-11-22 NOTE — ED PROVIDER NOTES
History  Chief Complaint   Patient presents with    Chest Pain     Pt arrived EMS w/ c/o of chest pressure that started 0230 this morning and a slight pain when she inhaled. No meds taken for chest pressure. Patient is a 60-year-old female seen in the emergency department with concern for substernal chest pressure which began at approximately 2:30 AM.  Patient notes that pain is worse with deep inspiration. Patient notes no radiation of the chest pain. Patient notes no other definite clear exacerbating or alleviating factors for her symptoms. Patient notes mild shortness of breath. Patient notes history of pulmonary embolism, which she states was diagnosed around September of this year. Patient states that she takes warfarin. Patient notes no recent long distance travel or hemoptysis. Patient states that she did not take any medication for symptom control this evening prior to evaluation in the emergency department. Prior to Admission Medications   Prescriptions Last Dose Informant Patient Reported? Taking? OXcarbazepine (TRILEPTAL) 300 mg tablet   No No   Sig: Take 1 tablet (300 mg total) by mouth every 12 (twelve) hours   atoMOXetine (STRATTERA) 40 mg capsule   No No   Sig: Take 1 capsule (40 mg total) by mouth daily   buPROPion (WELLBUTRIN XL) 300 mg 24 hr tablet   No No   Sig: Take 1 tablet (300 mg total) by mouth daily   cholecalciferol (VITAMIN D3) 1,000 units tablet   No No   Sig: Take 1 tablet (1,000 Units total) by mouth daily   cloNIDine (CATAPRES) 0.1 mg tablet   No No   Sig: Take 1 tablet (0.1 mg total) by mouth every 12 (twelve) hours   furosemide (LASIX) 20 mg tablet   No No   Sig: Take 1 tablet (20 mg total) by mouth daily Do not start before 2023.   haloperidol decanoate (Haldol Decanoate) 50 mg/mL injection   No No   Si mL (50 mg total) by Intramuscular - haloperidol decanoate route every 28 days Do not start before 2023.    levothyroxine (Euthyrox) 125 mcg tablet   No No   Sig: Take 1 tablet (125 mcg total) by mouth daily in the early morning   losartan (COZAAR) 100 MG tablet   No No   Sig: Take 1 tablet (100 mg total) by mouth daily   lurasidone 60 MG TABS   No No   Sig: Take 1 tablet (60 mg total) by mouth daily with breakfast Do not start before 2023.    melatonin 3 mg   No No   Sig: Take 1 tablet (3 mg total) by mouth daily at bedtime   naltrexone (REVIA) 50 mg tablet   No No   Sig: Take 1 tablet (50 mg total) by mouth daily   nystatin (MYCOSTATIN) powder   No No   Sig: Apply topically 2 (two) times a day   pantoprazole (PROTONIX) 20 mg tablet  Self No No   Sig: Take 1 tablet (20 mg total) by mouth daily in the early morning   rOPINIRole (REQUIP) 1 mg tablet  Self Yes No   Si mg   topiramate (TOPAMAX) 200 MG tablet   No No   Sig: Take 1 tablet (200 mg total) by mouth 2 (two) times a day   traZODone (DESYREL) 100 mg tablet   No No   Sig: Take 1 tablet (100 mg total) by mouth daily at bedtime as needed for sleep   trospium chloride (SANCTURA) 20 mg tablet  Self Yes No   Sig: Take 20 mg by mouth 2 (two) times a day   warfarin (COUMADIN) 5 mg tablet   No No   Sig: Take 1 tablet (5 mg total) by mouth daily      Facility-Administered Medications: None       Past Medical History:   Diagnosis Date    Anxiety     Arthritis     oa cassandra knees    Asthma     good control- no medications    Yan's esophagus     Bipolar affective disorder (HCC)     Cannabis abuse with unspecified 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HCC)     Chronic pain disorder     lumbar    COPD (chronic obstructive pulmonary disease) (HCC)     CPAP (continuous positive airway pressure) dependence     Degenerative disc disease at L5-S1 level     Deliberate self-cutting     9/15/22per pt has not done recently-- does see a therapist and psychiatrist regularly    Depression 2008    Disease of thyroid gland     hypo    MARTINEZ (dyspnea on exertion)     per pt "has had this with exertion and not new"    Drug overdose 10/28/2008    suicide attempt and again drug overdose 7/2022--[facility] floor and than transferred to [facility]    Dysphagia     Dyspnea     Edema     BLE    Family history of blood clots     GERD (gastroesophageal reflux disease)     Headache(784.0)     Headache, tension-type     Hemorrhage of rectum and anus 10/02/2017    Formatting of this note might be different from the original.  Added automatically from request for surgery [id number]    High blood pressure     History of COVID-19 12/30/2020    Symptoms started on 12/30/2020 and then got worse. Today she is feeling a little bit better. She is a candidate for monoclonal antibody infusion and set for 1/6/21 @ 1pm at [facility]. 01/07/21 - telemedicine -     Hyperlipidemia     Hypertension     Knee pain, bilateral     Especially right    Medical marijuana use     Memory loss     Migraines     Obesity     Overactive bladder     Pulmonary emboli (HCC)     Sjogren's disease ([address])     Sleep apnea     Stress incontinence     Suicidal ideations     Teeth missing     Tremor     per pt Tremors of Right Leg and both Arms    Visual impairment     Wears glasses        Past Surgical History:   Procedure Laterality Date    BREAST CYST EXCISION Right 1989    CARPAL TUNNEL RELEASE Left     CHOLECYSTECTOMY  05/2003    Laparoscopic    COLONOSCOPY      01/12/2009    DILATION AND CURETTAGE OF UTERUS      ELBOW SURGERY Left     x2.  No hardware    ESOPHAGOGASTRODUODENOSCOPY      FOOT SURGERY Left     Plantar fasciotomy    HERNIA REPAIR      HYSTERECTOMY      laporoscopic, partial    KNEE ARTHROSCOPY Bilateral     OOPHORECTOMY Left 10/2015    MO GASTROCNEMIUS RECESSION Left 02/24/2021    Procedure: RECESSION GASTROC OPEN;  Surgeon: Jakub Montoya DPM;  Location: [address] [place] [address] MAIN OR;  Service: Podiatry    MO RPR UMBILICAL HRNA 5 YRS/> REDUCIBLE N/A 04/24/2019    Procedure: REPAIR HERNIA UMBILICAL LAPAROSCOPIC W/ ROBOTICS;  Surgeon: Rishi Hugo MD; Location: [facility] Main OR;  Service: General    MD SPHINCTEROTOMY ANAL DIVISION SPHINCTER SPX N/A 2018    Procedure: EUA, LEFT PARTIAL INTERNAL SPHINCTEROTOMY;  Surgeon: Jesus Downing MD;  Location: [address] [place] [address] MAIN OR;  Service: Colorectal    MD TNOT ELBOW LATERAL/MEDIAL DEBRIDE OPEN TDN RPR Left 2022    Procedure: RELEASE EPICONDYLAR ELBOW MEDIAL;  Surgeon: Rosemary Martin MD;  [facility] MAIN OR;  Service: Orthopedics    REDUCTION MAMMAPLASTY Bilateral 1999    REPAIR LACERATION Left     left hand-2009    REPLACEMENT TOTAL KNEE Right     ROTATOR CUFF REPAIR Left     TONSILECTOMY AND ADNOIDECTOMY      US GUIDED MSK PROCEDURE  2021    VASCULAR SURGERY      VEIN LIGATION Bilateral     WISDOM TOOTH EXTRACTION         Family History   Problem Relation Age of Onset    Kidney cancer Mother     Diabetes Mother     Depression Mother     Stroke Mother     Arthritis Mother     Cancer Mother     Psychiatric Illness Mother     No Known Problems Father     No Known Problems Sister     No Known Problems Maternal Grandmother     No Known Problems Maternal Grandfather     No Known Problems Paternal Grandmother     No Known Problems Paternal Grandfather     Colon cancer Maternal Uncle     Colon cancer Maternal Uncle     Colon cancer Paternal Uncle     Colon cancer Family     Alcohol abuse Sister     Asthma Sister      I have reviewed and agree with the history as documented. E-Cigarette/Vaping    E-Cigarette Use Never User     Comments medical THC      E-Cigarette/Vaping Substances    Nicotine No     THC Yes     CBD No     Flavoring No     Other No     Unknown No      Social History     Tobacco Use    Smoking status: Former     Packs/day: 2.00     Years: 30.00     Total pack years: 60.00     Types: Cigarettes     Start date: 1985     Quit date: 2018     Years since quittin.8    Smokeless tobacco: Never    Tobacco comments:     Started at age 14.    Vaping Use    Vaping Use: Never used   Substance Use Topics    Alcohol use: Not Currently     Comment: Recovering alcoholic    Drug use: Yes     Types: Marijuana, Methamphetamines     Comment: 2 months off meth       Review of Systems   Constitutional:  Negative for chills and fever. HENT:  Negative for ear pain and sore throat. Eyes:  Negative for pain and visual disturbance. Respiratory:  Positive for shortness of breath. Negative for wheezing. Cardiovascular:  Positive for chest pain. Negative for palpitations. Gastrointestinal:  Negative for abdominal pain and vomiting. Genitourinary:  Negative for decreased urine volume and difficulty urinating. Musculoskeletal:  Negative for gait problem and neck stiffness. Skin:  Negative for color change and rash. Neurological:  Negative for seizures and syncope. Psychiatric/Behavioral:  Negative for agitation and confusion. All other systems reviewed and are negative. Physical Exam  Physical Exam  Vitals and nursing note reviewed. Constitutional:       Appearance: She is well-developed. Comments: Appears anxious   HENT:      Head: Normocephalic and atraumatic. Right Ear: External ear normal.      Left Ear: External ear normal.      Nose: Nose normal.      Mouth/Throat:      Pharynx: Oropharynx is clear. Eyes:      General: No scleral icterus. Conjunctiva/sclera: Conjunctivae normal.   Cardiovascular:      Rate and Rhythm: Normal rate and regular rhythm. Heart sounds: No murmur heard. Pulmonary:      Effort: Pulmonary effort is normal. No respiratory distress. Breath sounds: Normal breath sounds. Abdominal:      General: There is no distension. Palpations: Abdomen is soft. Tenderness: There is no abdominal tenderness. Musculoskeletal:         General: No deformity or signs of injury. Cervical back: Normal range of motion and neck supple. Skin:     General: Skin is warm and dry. Neurological:      General: No focal deficit present.       Mental Status: She is alert. Cranial Nerves: No cranial nerve deficit. Sensory: No sensory deficit. Psychiatric:         Mood and Affect: Mood is anxious. Thought Content:  Thought content normal.         Vital Signs  ED Triage Vitals [11/22/23 0355]   Temperature Pulse Respirations Blood Pressure SpO2   98.3 °F (36.8 °C) 87 20 159/84 92 %      Temp Source Heart Rate Source Patient Position - Orthostatic VS BP Location FiO2 (%)   Oral Monitor Lying Left arm --      Pain Score       No Pain           Vitals:    11/22/23 0355 11/22/23 0500   BP: 159/84    Pulse: 87 86   Patient Position - Orthostatic VS: Lying          Visual Acuity      ED Medications  Medications   sodium chloride 0.9 % bolus 1,000 mL (1,000 mL Intravenous New Bag 11/22/23 0555)   LORazepam (ATIVAN) tablet 1 mg (has no administration in time range)   acetaminophen (TYLENOL) tablet 975 mg (975 mg Oral Given 11/22/23 0408)   sodium chloride 0.9 % bolus 1,000 mL (0 mL Intravenous Stopped 11/22/23 0542)   iohexol (OMNIPAQUE) 350 MG/ML injection (SINGLE-DOSE) 100 mL (100 mL Intravenous Given 11/22/23 0521)   ketorolac (TORADOL) injection 15 mg (15 mg Intravenous Given 11/22/23 0555)       Diagnostic Studies  Results Reviewed       Procedure Component Value Units Date/Time    HS Troponin I 2hr [[id number]]  (Normal) Collected: 11/22/23 0553    Lab Status: Final result Specimen: Blood from Arm, Left Updated: 11/22/23 0634     hs TnI 2hr 5 ng/L      Delta 2hr hsTnI 1 ng/L     B-Type Natriuretic Peptide(BNP) [[id number]]  (Normal) Collected: 11/22/23 0354    Lab Status: Final result Specimen: Blood from Arm, Right Updated: 11/22/23 0431     BNP 40 pg/mL     Comprehensive metabolic panel [[id number]]  (Abnormal) Collected: 11/22/23 0354    Lab Status: Final result Specimen: Blood from Arm, Right Updated: 11/22/23 0428     Sodium 137 mmol/L      Potassium 4.1 mmol/L      Chloride 108 mmol/L      CO2 18 mmol/L      ANION GAP 11 mmol/L      BUN 11 mg/dL Creatinine 0.60 mg/dL      Glucose 126 mg/dL      Calcium 9.3 mg/dL      AST 15 U/L      ALT 20 U/L      Alkaline Phosphatase 61 U/L      Total Protein 7.6 g/dL      Albumin 4.1 g/dL      Total Bilirubin 0.20 mg/dL      eGFR 105 ml/min/1.73sq m     Narrative:      [facility] guidelines for Chronic Kidney Disease (CKD):     Stage 1 with normal or high GFR (GFR > 90 mL/min/1.73 square meters)    Stage 2 Mild CKD (GFR = 60-89 mL/min/1.73 square meters)    Stage 3A Moderate CKD (GFR = 45-59 mL/min/1.73 square meters)    Stage 3B Moderate CKD (GFR = 30-44 mL/min/1.73 square meters)    Stage 4 Severe CKD (GFR = 15-29 mL/min/1.73 square meters)    Stage 5 End Stage CKD (GFR <15 mL/min/1.73 square meters)  Note: GFR calculation is accurate only with a steady state creatinine    Magnesium [[id number]]  (Normal) Collected: 11/22/23 0354    Lab Status: Final result Specimen: Blood from Arm, Right Updated: 11/22/23 0428     Magnesium 2.2 mg/dL     HS Troponin 0hr (reflex protocol) [[id number]]  (Normal) Collected: 11/22/23 0354    Lab Status: Final result Specimen: Blood from Arm, Right Updated: 11/22/23 0426     hs TnI 0hr 4 ng/L     HS Troponin I 4hr [[id number]]     Lab Status: No result Specimen: Blood     APTT [[id number]]  (Abnormal) Collected: 11/22/23 0356    Lab Status: Final result Specimen: Blood from Arm, Right Updated: 11/22/23 0415     PTT 41 seconds     Protime-INR [[id number]]  (Abnormal) Collected: 11/22/23 0356    Lab Status: Final result Specimen: Blood from Arm, Right Updated: 11/22/23 0415     Protime 24.2 seconds      INR 2.21    CBC and differential [[id number]]  (Abnormal) Collected: 11/22/23 0354    Lab Status: Final result Specimen: Blood from Arm, Right Updated: 11/22/23 0403     WBC 11.65 Thousand/uL      RBC 4.89 Million/uL      Hemoglobin 14.7 g/dL      Hematocrit 47.2 %      MCV 97 fL      MCH 30.1 pg      MCHC 31.1 g/dL      RDW 13.7 %      MPV 10.7 fL      Platelets 982 Thousands/uL      nRBC 0 /100 WBCs      Neutrophils Relative 86 %      Immat GRANS % 2 %      Lymphocytes Relative 9 %      Monocytes Relative 3 %      Eosinophils Relative 0 %      Basophils Relative 0 %      Neutrophils Absolute 9.99 Thousands/µL      Immature Grans Absolute 0.23 Thousand/uL      Lymphocytes Absolute 1.09 Thousands/µL      Monocytes Absolute 0.32 Thousand/µL      Eosinophils Absolute 0.00 Thousand/µL      Basophils Absolute 0.02 Thousands/µL                    CTA ED chest PE Study   Final Result by Sarah Zacarias MD (11/22 5062)      1. No pulmonary embolism is seen to the segmental level with evaluation of the more peripheral subsegmental pulmonary arteries limited by timing of contrast bolus and mild motion artifact. 2.  No thoracic aortic aneurysm or dissection. 3.  No acute intrathoracic abnormalities with ancillary findings detailed above. Workstation performed: [id number]         XR chest 1 view portable   ED Interpretation by Mercedes Hernández MD (11/22 8999)   No infiltrate or pneumothorax                 Procedures  ECG 12 Lead Documentation Only    Date/Time: 11/22/2023 4:00 AM    Performed by: Mercedes Hernández MD  Authorized by: Mercedes Hernández MD    Indications / Diagnosis:  Chest pain  ECG reviewed by me, the ED Provider: yes    Patient location:  ED  Rate:     ECG rate:  85    ECG rate assessment: normal    Rhythm:     Rhythm: sinus rhythm    QRS:     QRS axis:  Left  ST segments:     ST segments:  Non-specific  T waves:     T waves: non-specific    Comments:      Normal sinus rhythm at 85, left axis deviation, , , QTc 428, nonspecific ST-T wave abnormality, no definite evidence of acute ischemia           ED Course  ED Course as of 11/22/23 0640   Wed Nov 22, 2023   3077 CTA chest-        IMPRESSION:     1.   No pulmonary embolism is seen to the segmental level with evaluation of the more peripheral subsegmental pulmonary arteries limited by timing of contrast bolus and mild motion artifact. 2.  No thoracic aortic aneurysm or dissection. 3.  No acute intrathoracic abnormalities with ancillary findings detailed above. HEART Risk Score      Flowsheet Row Most Recent Value   Heart Score Risk Calculator    History 1 Filed at: 11/22/2023 0539   ECG 1 Filed at: 11/22/2023 0539   Age 1 Filed at: 11/22/2023 0539   Risk Factors 2 Filed at: 11/22/2023 0539   Troponin 0 Filed at: 11/22/2023 0539   HEART Score 5 Filed at: 11/22/2023 2229                          SBIRT 20yo+      Flowsheet Row Most Recent Value   Initial Alcohol Screen: US AUDIT-C     1. How often do you have a drink containing alcohol? 0 Filed at: 11/22/2023 0350   2. How many drinks containing alcohol do you have on a typical day you are drinking? 0 Filed at: 11/22/2023 0350   3b. FEMALE Any Age, or MALE 65+: How often do you have 4 or more drinks on one occassion? 0 Filed at: 11/22/2023 0350   Audit-C Score 0 Filed at: 11/22/2023 6516   OLEG: How many times in the past year have you. .. Used an illegal drug or used a prescription medication for non-medical reasons? Never Filed at: 11/22/2023 0350                      Medical Decision Making  Patient is a 50-year-old female seen in the emergency department with concern for chest pain. EKG was obtained and noted. Chest x-ray showed no infiltrate or pneumothorax. Patient was treated with medication for symptom control, with good effect. Laboratory evaluation remarkable for low CO2 of 18, elevated PTT of 41, elevated PT of 24.2, elevated INR of 2.21, elevated white blood cell count of 11.65, 86% neutrophils, elevated hematocrit of 47.2, serial troponins of 4, 5. CTA chest was obtained to evaluate for pulmonary embolism or other acute abnormality. CT imaging was obtained and noted. No definite cause of the patient's symptoms was discovered. Evaluation is not consistent with ACS, PE, pneumothorax, or pneumonia.  HEART score was reviewed, and admission is not indicated at this time. Patient's symptoms might be due to anxiety. Plan to have patient follow up with PCP/outpatient providers. Patient stable for discharge home. Discharge instructions were reviewed with patient. Problems Addressed:  Chest pain in adult: acute illness or injury    Amount and/or Complexity of Data Reviewed  Labs: ordered. Decision-making details documented in ED Course. Radiology: ordered and independent interpretation performed. Decision-making details documented in ED Course. ECG/medicine tests: ordered and independent interpretation performed. Decision-making details documented in ED Course. Risk  OTC drugs. Prescription drug management. Disposition  Final diagnoses:   Chest pain in adult     Time reflects when diagnosis was documented in both MDM as applicable and the Disposition within this note       Time User Action Codes Description Comment    11/22/2023  3:48 AM Bismark Vincent Add [R07.9] Chest pain in adult           ED Disposition       ED Disposition   Discharge    Condition   Stable    Date/Time   Wed Nov 22, 2023 [address] discharge to home/self care.                    Follow-up Information       Follow up With Specialties Details Why Contact Info Additional [address] [place], [place] Nurse Practitioner Call in 1 day  [address] [facility] [address] [facility] Cardiology Call in 1 day  [address] [facility] [phone]  [address] [facility] [address] [place], [address] [facility]            Patient's Medications   Discharge Prescriptions    No medications on file           PDMP Review         Value Time User    PDMP Reviewed  Yes 11/10/2023  2:54 PM [place], [place]            ED Provider  Electronically Signed by             Michael Crandall MD  11/22/23 2016 Marquis Sera MD  11/22/23 5589

## 2023-11-22 NOTE — ED NOTES
Discharge reviewed with pt. Pt verbalized understanding and has no further questions at this time. Pt ambulatory off unit with steady gait.      Lauryn Gould RN  11/22/23 5820

## 2023-11-22 NOTE — PROGRESS NOTES
Outpatient Care Management Note:  In basket ER alert received. Chart review completed. Call placed to Poppy Ba. She presented to ER with chest pain that began around 2:30 AM.  Per Poppy Ba, she used her inhaler prior to going to the ER and the ER gave her medication for anxiety. She is unsure if the pressure was from anxiety or her breathing. Discussed allowing inhaler a few minutes to work when using, verbalized understanding. States she still has some chest pressure but it is improved. Denies any cough or SOB. Discussed using inhaler to see if lingering chest pressure improves. Discussed results of chest CTA and re-assured her there was no sign of previous PE. Poppy Ba had her appointment with [facility] orthopedics yesterday and received a cortisone injection to her left knee. States she is already experiencing some relief. Poppy Ba has not made any changes to her diet due to recent hospitalization. She does have an appointment with bariatrics in March. Reviewed Blanca's upcoming appointments. She has PT today and will then see her psychiatrist through [facility]. Poppy Ba has appointments with her PCP and her therapist from [facility] on 11/27/23. She sees her D&A counselor on 11/30/2023. No changes in her medications since last outreach. Transitions:   Type: Unplanned  Provider notification within 2 days of discharge  PCP: Ellen Gomez  Notified via: ADT alert  Specialty Provider: [facility]  Notified via: Patient has appointment this date  Other Care Team Member: Sarah Darling  Notified via: ADT alert  Collaboration with discharge team: reviewed HP IP/OP [facility]  Communication of changes to care plans to patient/caregiver: verbally reviewed with patient/caregiver    Poppy Ba does not feel she needs additional education on COPD. She is not ready to work on her weight loss at this time due to [id number] issues which Sarah RODRIGEZ CM discussed with [facility] yesterday. Will transition to a low follow up schedule at this time.   Poppy Ba is aware she may call with questions or concerns. Follow-up Schedule:  Low

## 2023-11-25 ENCOUNTER — HOSPITAL ENCOUNTER (EMERGENCY)
Facility: HOSPITAL | Age: 53
Discharge: HOME | End: 2023-11-25
Attending: EMERGENCY MEDICINE
Payer: MEDICARE

## 2023-11-25 VITALS
TEMPERATURE: 97.7 F | SYSTOLIC BLOOD PRESSURE: 185 MMHG | WEIGHT: 293 LBS | HEART RATE: 71 BPM | DIASTOLIC BLOOD PRESSURE: 82 MMHG | HEIGHT: 66 IN | RESPIRATION RATE: 22 BRPM | OXYGEN SATURATION: 96 % | BODY MASS INDEX: 47.09 KG/M2

## 2023-11-25 DIAGNOSIS — T50.901A ACCIDENTAL OVERDOSE, INITIAL ENCOUNTER: ICD-10-CM

## 2023-11-25 DIAGNOSIS — T43.211A: Primary | ICD-10-CM

## 2023-11-25 LAB
ALBUMIN SERPL BCP-MCNC: 3.9 G/DL (ref 3.5–5)
ALP SERPL-CCNC: 57 U/L (ref 34–104)
ALT SERPL W P-5'-P-CCNC: 20 U/L (ref 7–52)
ANION GAP SERPL CALCULATED.3IONS-SCNC: 8 MMOL/L
APAP SERPL-MCNC: <2 UG/ML (ref 10–20)
AST SERPL W P-5'-P-CCNC: 14 U/L (ref 13–39)
BASOPHILS # BLD AUTO: 0.02 THOUSANDS/ÂΜL (ref 0–0.1)
BASOPHILS NFR BLD AUTO: 0 % (ref 0–1)
BILIRUB SERPL-MCNC: 0.2 MG/DL (ref 0.2–1)
BUN SERPL-MCNC: 19 MG/DL (ref 5–25)
CALCIUM SERPL-MCNC: 9.3 MG/DL (ref 8.4–10.2)
CHLORIDE SERPL-SCNC: 106 MMOL/L (ref 96–108)
CO2 SERPL-SCNC: 25 MMOL/L (ref 21–32)
CREAT SERPL-MCNC: 0.64 MG/DL (ref 0.6–1.3)
EOSINOPHIL # BLD AUTO: 0.02 THOUSAND/ÂΜL (ref 0–0.61)
EOSINOPHIL NFR BLD AUTO: 0 % (ref 0–6)
ERYTHROCYTE [DISTWIDTH] IN BLOOD BY AUTOMATED COUNT: 13.9 % (ref 11.6–15.1)
ETHANOL SERPL-MCNC: <10 MG/DL
GFR SERPL CREATININE-BSD FRML MDRD: 102 ML/MIN/1.73SQ M
GLUCOSE SERPL-MCNC: 105 MG/DL (ref 65–140)
HCT VFR BLD AUTO: 46.5 % (ref 34.8–46.1)
HGB BLD-MCNC: 15.4 G/DL (ref 11.5–15.4)
IMM GRANULOCYTES # BLD AUTO: 0.13 THOUSAND/UL (ref 0–0.2)
IMM GRANULOCYTES NFR BLD AUTO: 1 % (ref 0–2)
LYMPHOCYTES # BLD AUTO: 1.9 THOUSANDS/ÂΜL (ref 0.6–4.47)
LYMPHOCYTES NFR BLD AUTO: 18 % (ref 14–44)
MCH RBC QN AUTO: 30.3 PG (ref 26.8–34.3)
MCHC RBC AUTO-ENTMCNC: 33.1 G/DL (ref 31.4–37.4)
MCV RBC AUTO: 91 FL (ref 82–98)
MONOCYTES # BLD AUTO: 0.93 THOUSAND/ÂΜL (ref 0.17–1.22)
MONOCYTES NFR BLD AUTO: 9 % (ref 4–12)
NEUTROPHILS # BLD AUTO: 7.48 THOUSANDS/ÂΜL (ref 1.85–7.62)
NEUTS SEG NFR BLD AUTO: 72 % (ref 43–75)
NRBC BLD AUTO-RTO: 0 /100 WBCS
PLATELET # BLD AUTO: 180 THOUSANDS/UL (ref 149–390)
PMV BLD AUTO: 10.6 FL (ref 8.9–12.7)
POTASSIUM SERPL-SCNC: 3.8 MMOL/L (ref 3.5–5.3)
PROT SERPL-MCNC: 7.2 G/DL (ref 6.4–8.4)
RBC # BLD AUTO: 5.09 MILLION/UL (ref 3.81–5.12)
SALICYLATES SERPL-MCNC: <5 MG/DL (ref 3–20)
SODIUM SERPL-SCNC: 139 MMOL/L (ref 135–147)
WBC # BLD AUTO: 10.48 THOUSAND/UL (ref 4.31–10.16)

## 2023-11-25 PROCEDURE — 85025 COMPLETE CBC W/AUTO DIFF WBC: CPT | Performed by: EMERGENCY MEDICINE

## 2023-11-25 PROCEDURE — 99285 EMERGENCY DEPT VISIT HI MDM: CPT | Performed by: EMERGENCY MEDICINE

## 2023-11-25 PROCEDURE — 80179 DRUG ASSAY SALICYLATE: CPT | Performed by: EMERGENCY MEDICINE

## 2023-11-25 PROCEDURE — 82077 ASSAY SPEC XCP UR&BREATH IA: CPT | Performed by: EMERGENCY MEDICINE

## 2023-11-25 PROCEDURE — 99284 EMERGENCY DEPT VISIT MOD MDM: CPT

## 2023-11-25 PROCEDURE — 36415 COLL VENOUS BLD VENIPUNCTURE: CPT | Performed by: EMERGENCY MEDICINE

## 2023-11-25 PROCEDURE — 80143 DRUG ASSAY ACETAMINOPHEN: CPT | Performed by: EMERGENCY MEDICINE

## 2023-11-25 PROCEDURE — 80053 COMPREHEN METABOLIC PANEL: CPT | Performed by: EMERGENCY MEDICINE

## 2023-11-25 NOTE — ED PROVIDER NOTES
History  Chief Complaint   Patient presents with    Overdose - Accidental     Pt presents to the ED after accidentally taking 6-8 trazodone at home. Pt denies SI and is stating she was confused while taking medicine at home     HPI    Prior to Admission Medications   Prescriptions Last Dose Informant Patient Reported? Taking? OXcarbazepine (TRILEPTAL) 300 mg tablet   No No   Sig: Take 1 tablet (300 mg total) by mouth every 12 (twelve) hours   atoMOXetine (STRATTERA) 40 mg capsule   No No   Sig: Take 1 capsule (40 mg total) by mouth daily   buPROPion (WELLBUTRIN XL) 300 mg 24 hr tablet   No No   Sig: Take 1 tablet (300 mg total) by mouth daily   cholecalciferol (VITAMIN D3) 1,000 units tablet   No No   Sig: Take 1 tablet (1,000 Units total) by mouth daily   cloNIDine (CATAPRES) 0.1 mg tablet   No No   Sig: Take 1 tablet (0.1 mg total) by mouth every 12 (twelve) hours   furosemide (LASIX) 20 mg tablet   No No   Sig: Take 1 tablet (20 mg total) by mouth daily Do not start before 2023.   haloperidol decanoate (Haldol Decanoate) 50 mg/mL injection   No No   Si mL (50 mg total) by Intramuscular - haloperidol decanoate route every 28 days Do not start before 2023. levothyroxine (Euthyrox) 125 mcg tablet   No No   Sig: Take 1 tablet (125 mcg total) by mouth daily in the early morning   losartan (COZAAR) 100 MG tablet   No No   Sig: Take 1 tablet (100 mg total) by mouth daily   lurasidone 60 MG TABS   No No   Sig: Take 1 tablet (60 mg total) by mouth daily with breakfast Do not start before 2023.    melatonin 3 mg   No No   Sig: Take 1 tablet (3 mg total) by mouth daily at bedtime   naltrexone (REVIA) 50 mg tablet   No No   Sig: Take 1 tablet (50 mg total) by mouth daily   nystatin (MYCOSTATIN) powder   No No   Sig: Apply topically 2 (two) times a day   pantoprazole (PROTONIX) 20 mg tablet  Self No No   Sig: Take 1 tablet (20 mg total) by mouth daily in the early morning rOPINIRole (REQUIP) 1 mg tablet  Self Yes No   Si mg   topiramate (TOPAMAX) 200 MG tablet   No No   Sig: Take 1 tablet (200 mg total) by mouth 2 (two) times a day   traZODone (DESYREL) 100 mg tablet   No No   Sig: Take 1 tablet (100 mg total) by mouth daily at bedtime as needed for sleep   trospium chloride (SANCTURA) 20 mg tablet  Self Yes No   Sig: Take 20 mg by mouth 2 (two) times a day   warfarin (COUMADIN) 5 mg tablet   No No   Sig: Take 1 tablet (5 mg total) by mouth daily      Facility-Administered Medications: None       Past Medical History:   Diagnosis Date    Anxiety     Arthritis     oa cassandra knees    Asthma     good control- no medications    Yan's esophagus     Bipolar affective disorder (HCC)     Cannabis abuse with unspecified 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HCC)     Chronic pain disorder     lumbar    COPD (chronic obstructive pulmonary disease) (HCC)     CPAP (continuous positive airway pressure) dependence     Degenerative disc disease at L5-S1 level     Deliberate self-cutting     9/15/22per pt has not done recently-- does see a therapist and psychiatrist regularly    Depression 2008    Disease of thyroid gland     hypo    MARTINEZ (dyspnea on exertion)     per pt "has had this with exertion and not new"    Drug overdose 10/28/2008    suicide attempt and again drug overdose 2022-- [facility] floor and than transferred to [facility]    Dysphagia     Dyspnea     Edema     BLE    Family history of blood clots     GERD (gastroesophageal reflux disease)     Headache(784.0)     Headache, tension-type     Hemorrhage of rectum and anus 10/02/2017    Formatting of this note might be different from the original.  Added automatically from request for surgery [id number]    High blood pressure     History of COVID-19 2020    Symptoms started on 2020 and then got worse. Today she is feeling a little bit better.   She is a candidate for monoclonal antibody infusion and set for 21 @ 1pm at [facility]. 01/07/21 - telemedicine -     Hyperlipidemia     Hypertension     Knee pain, bilateral     Especially right    Medical marijuana use     Memory loss     Migraines     Obesity     Overactive bladder     Pulmonary emboli (HCC)     Sjogren's disease ([address])     Sleep apnea     Stress incontinence     Suicidal ideations     Teeth missing     Tremor     per pt Tremors of Right Leg and both Arms    Visual impairment     Wears glasses        Past Surgical History:   Procedure Laterality Date    BREAST CYST EXCISION Right 1989    CARPAL TUNNEL RELEASE Left     CHOLECYSTECTOMY  05/2003    Laparoscopic    COLONOSCOPY      01/12/2009    DILATION AND CURETTAGE OF UTERUS      ELBOW SURGERY Left     x2.  No hardware    ESOPHAGOGASTRODUODENOSCOPY      FOOT SURGERY Left     Plantar fasciotomy    HERNIA REPAIR      HYSTERECTOMY      laporoscopic, partial    KNEE ARTHROSCOPY Bilateral     OOPHORECTOMY Left 10/2015    AR GASTROCNEMIUS RECESSION Left 02/24/2021    Procedure: RECESSION GASTROC OPEN;  Surgeon: Sina Shepherd DPM;  Location: [address] [place] [address] OR;  Service: Podiatry    AR RPR UMBILICAL HRNA 5 YRS/> REDUCIBLE N/A 04/24/2019    Procedure: REPAIR HERNIA UMBILICAL LAPAROSCOPIC W/ ROBOTICS;  Surgeon: Kirby Michael MD;  Location: [facility] OR;  Service: General    AR SPHINCTEROTOMY ANAL DIVISION SPHINCTER [facility] N/A 08/31/2018    Procedure: EUA, LEFT PARTIAL INTERNAL SPHINCTEROTOMY;  Surgeon: Ricky Aj MD;  Location: [address] [place] [address] OR;  Service: Colorectal    AR TNOT ELBOW LATERAL/MEDIAL DEBRIDE OPEN TDN RPR Left 09/20/2022    Procedure: RELEASE EPICONDYLAR ELBOW MEDIAL;  Surgeon: Marcia Browning MD;  Location: [facility] OR;  Service: Orthopedics    REDUCTION MAMMAPLASTY Bilateral 1999    REPAIR LACERATION Left     left hand-5/18/2009    REPLACEMENT TOTAL KNEE Right     ROTATOR CUFF REPAIR Left     TONSILECTOMY AND ADNOIDECTOMY      US GUIDED MSK PROCEDURE  04/22/2021    VASCULAR SURGERY      VEIN LIGATION Bilateral WISDOM TOOTH EXTRACTION         Family History   Problem Relation Age of Onset    Kidney cancer Mother     Diabetes Mother     Depression Mother     Stroke Mother     Arthritis Mother     Cancer Mother     Psychiatric Illness Mother     No Known Problems Father     No Known Problems Sister     No Known Problems Maternal Grandmother     No Known Problems Maternal Grandfather     No Known Problems Paternal Grandmother     No Known Problems Paternal Grandfather     Colon cancer Maternal Uncle     Colon cancer Maternal Uncle     Colon cancer Paternal Uncle     Colon cancer Family     Alcohol abuse Sister     Asthma Sister      I have reviewed and agree with the history as documented. E-Cigarette/Vaping    E-Cigarette Use Never User     Comments medical THC      E-Cigarette/Vaping Substances    Nicotine No     THC Yes     CBD No     Flavoring No     Other No     Unknown No      Social History     Tobacco Use    Smoking status: Former     Packs/day: 2.00     Years: 30.00     Total pack years: 60.00     Types: Cigarettes     Start date: 1985     Quit date: 2018     Years since quittin.8    Smokeless tobacco: Never    Tobacco comments:     Started at age 14. Vaping Use    Vaping Use: Never used   Substance Use Topics    Alcohol use: Not Currently     Comment: Recovering alcoholic    Drug use: Yes     Types: Marijuana, Methamphetamines     Comment: 2 months off meth       Review of Systems   Constitutional:  Negative for chills and fever. HENT:  Negative for congestion, rhinorrhea and sore throat. Respiratory:  Negative for cough and shortness of breath. Cardiovascular:  Negative for chest pain and palpitations. Gastrointestinal:  Negative for abdominal pain, diarrhea, nausea and vomiting. Genitourinary:  Negative for dysuria and hematuria. Musculoskeletal:  Negative for back pain and neck pain. Neurological:  Negative for dizziness, weakness, light-headedness, numbness and headaches. Psychiatric/Behavioral:  Negative for dysphoric mood, self-injury and suicidal ideas. The patient is nervous/anxious. All other systems reviewed and are negative. Physical Exam  Physical Exam  Vitals and nursing note reviewed. Constitutional:       General: She is not in acute distress. Appearance: Normal appearance. She is normal weight. She is not ill-appearing, toxic-appearing or diaphoretic. HENT:      Head: Normocephalic and atraumatic. Right Ear: External ear normal.      Left Ear: External ear normal.      Nose: Nose normal. No congestion or rhinorrhea. Mouth/Throat:      Mouth: Mucous membranes are moist.      Pharynx: Oropharynx is clear. No oropharyngeal exudate or posterior oropharyngeal erythema. Eyes:      Extraocular Movements: Extraocular movements intact. Conjunctiva/sclera: Conjunctivae normal.      Pupils: Pupils are equal, round, and reactive to light. Cardiovascular:      Rate and Rhythm: Normal rate and regular rhythm. Pulses: Normal pulses. Heart sounds: Normal heart sounds. No murmur heard. Pulmonary:      Effort: Pulmonary effort is normal. No respiratory distress. Breath sounds: Normal breath sounds. No wheezing or rales. Abdominal:      General: Abdomen is flat. Bowel sounds are normal. There is no distension. Palpations: Abdomen is soft. Tenderness: There is no abdominal tenderness. There is no right CVA tenderness, left CVA tenderness or guarding. Musculoskeletal:         General: No swelling or tenderness. Normal range of motion. Cervical back: Normal range of motion and neck supple. No tenderness. Skin:     General: Skin is warm and dry. Capillary Refill: Capillary refill takes less than 2 seconds. Neurological:      General: No focal deficit present. Mental Status: She is alert and oriented to person, place, and time. Cranial Nerves: No cranial nerve deficit. Sensory: No sensory deficit. Motor: No weakness. Vital Signs  ED Triage Vitals [11/25/23 0038]   Temperature Pulse Respirations Blood Pressure SpO2   98.5 °F (36.9 °C) 71 22 (!) 204/95 96 %      Temp Source Heart Rate Source Patient Position - Orthostatic VS BP Location FiO2 (%)   Oral Monitor Sitting Left arm --      Pain Score       No Pain           Vitals:    11/25/23 0038 11/25/23 0045   BP: (!) 204/95 (!) 185/82   Pulse: 71    Patient Position - Orthostatic VS: Sitting Sitting         Visual Acuity      ED Medications  Medications - No data to display    Diagnostic Studies  Results Reviewed       Procedure Component Value Units Date/Time    Salicylate level [[id number]]  (Normal) Collected: 11/25/23 0146    Lab Status: Final result Specimen: Blood from Arm, Left Updated: 45/83/63 7894     Salicylate Lvl <5 mg/dL     Acetaminophen level-If concentration is detectable, please discuss with medical  on call.  [[id number]]  (Abnormal) Collected: 11/25/23 0146    Lab Status: Final result Specimen: Blood from Arm, Left Updated: 11/25/23 0214     Acetaminophen Level <2 ug/mL     Comprehensive metabolic panel [[id number]] Collected: 11/25/23 0146    Lab Status: Final result Specimen: Blood from Arm, Left Updated: 11/25/23 9388     Sodium 139 mmol/L      Potassium 3.8 mmol/L      Chloride 106 mmol/L      CO2 25 mmol/L      ANION GAP 8 mmol/L      BUN 19 mg/dL      Creatinine 0.64 mg/dL      Glucose 105 mg/dL      Calcium 9.3 mg/dL      AST 14 U/L      ALT 20 U/L      Alkaline Phosphatase 57 U/L      Total Protein 7.2 g/dL      Albumin 3.9 g/dL      Total Bilirubin 0.20 mg/dL      eGFR 102 ml/min/1.73sq m     Narrative:      [place] guidelines for Chronic Kidney Disease (CKD):     Stage 1 with normal or high GFR (GFR > 90 mL/min/1.73 square meters)    Stage 2 Mild CKD (GFR = 60-89 mL/min/1.73 square meters)    Stage 3A Moderate CKD (GFR = 45-59 mL/min/1.73 square meters)    Stage 3B Moderate CKD (GFR = 30-44 mL/min/1.73 square meters)    Stage 4 Severe CKD (GFR = 15-29 mL/min/1.73 square meters)    Stage 5 End Stage CKD (GFR <15 mL/min/1.73 square meters)  Note: GFR calculation is accurate only with a steady state creatinine    Ethanol [[id number]]  (Normal) Collected: 11/25/23 0146    Lab Status: Final result Specimen: Blood from Arm, Left Updated: 11/25/23 0208     Ethanol Lvl <10 mg/dL     CBC and differential [[id number]]  (Abnormal) Collected: 11/25/23 0146    Lab Status: Final result Specimen: Blood from Arm, Left Updated: 11/25/23 0151     WBC 10.48 Thousand/uL      RBC 5.09 Million/uL      Hemoglobin 15.4 g/dL      Hematocrit 46.5 %      MCV 91 fL      MCH 30.3 pg      MCHC 33.1 g/dL      RDW 13.9 %      MPV 10.6 fL      Platelets 559 Thousands/uL      nRBC 0 /100 WBCs      Neutrophils Relative 72 %      Immat GRANS % 1 %      Lymphocytes Relative 18 %      Monocytes Relative 9 %      Eosinophils Relative 0 %      Basophils Relative 0 %      Neutrophils Absolute 7.48 Thousands/µL      Immature Grans Absolute 0.13 Thousand/uL      Lymphocytes Absolute 1.90 Thousands/µL      Monocytes Absolute 0.93 Thousand/µL      Eosinophils Absolute 0.02 Thousand/µL      Basophils Absolute 0.02 Thousands/µL                    No orders to display              Procedures  Procedures         ED Course  ED Course as of 11/25/23 0600   Sat Nov 25, 2023   0117 Seen with accidental trazodone overdose due to confusion with her medications tonight. Reports taking 6-8 trazodone at approximately 11:30 PM.  Call placed to poison control center, recommending basic labs, coma panel, ECG and cardiac monitoring. Monitoring QTc duration, if prolonged over 500 ms, will give magnesium. Monitoring QRS duration, if over 100 ms, will require bicarb bolus. Observation period  4-6 hours.    2279 Procedure Note: EKG  Date/Time: 11/25/23 1:32 AM   Interpreted by: Estela Blair MD  Indications / Diagnosis: Accidental overdose  ECG reviewed by me, the ED Physician: yes   The EKG demonstrates:  Rhythm: normal sinus rhythm 73 bpm  Intervals: normal intervals, Qtc 418 ms  Axis: left axis deviation  QRS/Blocks: normal QRS, 90 ms  ST Changes: No acute ST-T wave changes, no STD/BHARGAV. No STEMI.   0202 WBC(!): 10.48   0202 Hemoglobin: 15.4   0202 Platelet Count: 493   0224 MEDICAL ALCOHOL: <85   0045 SALICYLATE LEVEL: <5   0224 ACETAMINOPHEN LEVEL(!): <2   0224 Comprehensive metabolic panel  All WNL                               SBIRT 20yo+      Flowsheet Row Most Recent Value   Initial Alcohol Screen: US AUDIT-C     1. How often do you have a drink containing alcohol? 0 Filed at: 11/25/2023 0153   2. How many drinks containing alcohol do you have on a typical day you are drinking? 0 Filed at: 11/25/2023 0153   3b. FEMALE Any Age, or MALE 65+: How often do you have 4 or more drinks on one occassion? 0 Filed at: 11/25/2023 0153   Audit-C Score 0 Filed at: 11/25/2023 0153   OLEG: How many times in the past year have you. .. Used an illegal drug or used a prescription medication for non-medical reasons? Never Filed at: 11/25/2023 0153                      Medical Decision Making  Amount and/or Complexity of Data Reviewed  Labs: ordered. Decision-making details documented in ED Course.              Disposition  Final diagnoses:   Accidental overdose, initial encounter   Accidental overdose of trazodone     Time reflects when diagnosis was documented in both MDM as applicable and the Disposition within this note       Time User Action Codes Description Comment    11/25/2023  5:45 AM Luna Ángel Add [T50.901A] Accidental overdose, initial encounter     11/25/2023  5:45 AM Luna Ángel Add [C11.340S] Accidental overdose of trazodone     11/25/2023  5:45 AM Luna Ángel Modify [T50.901A] Accidental overdose, initial encounter     11/25/2023  5:45 AM Luna Ángel Modify [I04.285G] Accidental overdose of trazodone           ED Disposition       ED Disposition Discharge    Condition   Stable    Date/Time   Sat Nov 25, 2023 4212 [address] discharge to home/self care. Follow-up Information       Follow up With Specialties Details Why Contact Info Additional Information    Ny Clemens [facility] Nurse Practitioner Call in 1 week For follow-up [address] [phone]       [address] [facility] Emergency Medicine Go to  If symptoms worsen [address] [phone]  [address] [facility] Emergency Department, [address] [place] [address] Dr, [facility]            Patient's Medications   Discharge Prescriptions    No medications on file       No discharge procedures on file.     PDMP Review         Value Time User    PDMP Reviewed  Yes 11/10/2023  2:54 PM Jayson Sever, [facility]            ED Provider  Electronically Signed by Accidental overdose, initial encounter     11/25/2023  5:45 AM Ngozi Esquivel Add [X58.340T] Accidental overdose of trazodone     11/25/2023  5:45 AM Ngozi Bidding Modify [T50.901A] Accidental overdose, initial encounter     11/25/2023  5:45 AM Ngozi Bidding Modify [A29.255I] Accidental overdose of trazodone           ED Disposition       ED Disposition   Discharge    Condition   Stable    Date/Time   Sat Nov 25, 2023 0545    [address] discharge to home/self care. Follow-up Information       Follow up With Specialties Details Why Contact Info Additional Information    Zoraida Gillespie [address] [place] [address] Nurse Practitioner Call in 1 week For follow-up [address] [phone]       [facility] Emergency Medicine Go to  If symptoms worsen [address] [place] [phone] 1356 [facility] Emergency Department, [address] [place] [address] Dr, [address] [place] [address]            Patient's Medications   Discharge Prescriptions    No medications on file       No discharge procedures on file.     PDMP Review         Value Time User    PDMP Reviewed  Yes 11/10/2023  2:54 PM Liset Bedoya, [address] [place] [address]            ED Provider  Electronically Signed by             Ngozi Esquivel MD  12/13/23 6587

## 2023-11-27 ENCOUNTER — OFFICE VISIT (OUTPATIENT)
Dept: FAMILY MEDICINE CLINIC | Facility: CLINIC | Age: 53
End: 2023-11-27
Payer: MEDICARE

## 2023-11-27 ENCOUNTER — PATIENT OUTREACH (OUTPATIENT)
Dept: CASE MANAGEMENT | Facility: OTHER | Age: 53
End: 2023-11-27

## 2023-11-27 VITALS
SYSTOLIC BLOOD PRESSURE: 128 MMHG | TEMPERATURE: 98.2 F | DIASTOLIC BLOOD PRESSURE: 82 MMHG | OXYGEN SATURATION: 100 % | HEART RATE: 85 BPM | HEIGHT: 66 IN | WEIGHT: 293 LBS | BODY MASS INDEX: 47.09 KG/M2

## 2023-11-27 DIAGNOSIS — Z92.89 HOSPITALIZATION WITHIN LAST 30 DAYS: Primary | ICD-10-CM

## 2023-11-27 DIAGNOSIS — I10 BENIGN ESSENTIAL HYPERTENSION: Chronic | ICD-10-CM

## 2023-11-27 DIAGNOSIS — G92.9 TOXIC ENCEPHALOPATHY: ICD-10-CM

## 2023-11-27 DIAGNOSIS — T50.902A INTENTIONAL OVERDOSE, INITIAL ENCOUNTER (HCC): ICD-10-CM

## 2023-11-27 DIAGNOSIS — G25.81 RESTLESS LEG SYNDROME: ICD-10-CM

## 2023-11-27 DIAGNOSIS — F25.0 SCHIZOAFFECTIVE DISORDER, BIPOLAR TYPE (HCC): Chronic | ICD-10-CM

## 2023-11-27 DIAGNOSIS — T65.92XD: ICD-10-CM

## 2023-11-27 PROBLEM — L25.9 CONTACT DERMATITIS AND ECZEMA: Status: RESOLVED | Noted: 2023-09-15 | Resolved: 2023-11-27

## 2023-11-27 PROBLEM — M77.01 MEDIAL EPICONDYLITIS OF RIGHT ELBOW: Status: RESOLVED | Noted: 2023-07-17 | Resolved: 2023-11-27

## 2023-11-27 PROBLEM — M77.02 MEDIAL EPICONDYLITIS OF ELBOW, LEFT: Status: RESOLVED | Noted: 2018-04-03 | Resolved: 2023-11-27

## 2023-11-27 PROBLEM — K21.9 GERD (GASTROESOPHAGEAL REFLUX DISEASE): Status: RESOLVED | Noted: 2022-01-06 | Resolved: 2023-11-27

## 2023-11-27 PROBLEM — J98.4 RESTRICTIVE LUNG DISEASE: Status: RESOLVED | Noted: 2017-02-01 | Resolved: 2023-11-27

## 2023-11-27 PROBLEM — J96.00 ACUTE RESPIRATORY FAILURE (HCC): Status: RESOLVED | Noted: 2023-09-02 | Resolved: 2023-11-27

## 2023-11-27 PROBLEM — M17.0 PRIMARY OSTEOARTHRITIS OF BOTH KNEES: Status: RESOLVED | Noted: 2018-08-08 | Resolved: 2023-11-27

## 2023-11-27 PROCEDURE — 99496 TRANSJ CARE MGMT HIGH F2F 7D: CPT

## 2023-11-27 NOTE — ASSESSMENT & PLAN NOTE
Patient has a history of this last notable in 2022 when patient took 14 pills of Depakote   Most recently patient took 40 tablets of Flexeril tablets on 11/06/23 11/25/23 Patient presented to the ER after taking 6-8 pills of Trazodone 100mg  Follows with [facility] Psychiatry

## 2023-11-27 NOTE — PROGRESS NOTES
Outpatient Care Management Note:  In basket ER alert received. Chart review completed. Outreach scheduled.

## 2023-11-27 NOTE — ASSESSMENT & PLAN NOTE
11/10/23 Admission   Lethargy & delirium  Related to Flexeril overdose. Coma panel, valproic acid levels, ammonia levels wnl on admission  UDS: + THC.   Stable upon discharge   Doing well today 11/27/23

## 2023-11-27 NOTE — PROGRESS NOTES
Outpatient Care Management Note:  Call placed to Michelle Brewster after recent ER visit. States that she has been in a "fog" the last few days and could not remember whether or not she took her trazadone. Asked how she took 6-8 tablets if she could only remember once. No answer received other than stating the inpatient [facility] "messed her medications all up."  Discussed psychiatrist appointment since that visit. Per Michelle Brewster he made medication changes on 11/22/2023 but she had not been taking the new medications. She states the pharmacy would not do new pill packs until after she see her PCP because they don't like to keep making changes. Questioned if she was able to tell me medication changes that were made and she is unable. Michelle Brewster did not  any new medications from the pharmacy in order to start on the correct doses. Michelle Brewster states she did call [facility] after she accidentally Od'd and that she has an appointment with her therapist this afternoon after her PCP appointment. Discussed the need for Michelle Brewster to take responsibility for her care, including taking the correct medications as she has had multiple ER and hospital visits related to hr BH. Call disconnected by Michelle Brewster at that time. Call paced to [facility] to confirm they were aware of her ER visit. Message left for  Michelle Brewster with information and my contact information. Also [phone] to PCP office as Michelle Brewster is unable to provide one. Transitions:   Type: Unplanned  Provider notification within 2 days of discharge  PCP: Albertina  Notified via: ADT alert  Specialty Provider: [facility]  Notified via: Telephonic  Other Care Team Member: RAIN HERNANDEZ  Notified via: ADT alert  Collaboration with discharge team: reviewed HP IP/OP [facility] and telephonic  Communication of changes to care plans to patient/caregiver: verbally reviewed with patient/caregiver  Note routed to PCP.

## 2023-11-27 NOTE — PATIENT INSTRUCTIONS
Suicide Prevention   WHAT YOU NEED TO KNOW:   You may see suicide as the only way to escape emotional or physical pain and suffering. Help is available from people who care about you, and from professionals trained in suicide prevention. Prevention includes everything you and others can do to stop you from taking your life. DISCHARGE INSTRUCTIONS:   Call your local emergency number (911 in the [place]), or ask someone to call if:   You do something on purpose to hurt yourself. You make a plan to attempt suicide. Call your doctor or therapist, or have someone close to you call if:   You act out in anger, are reckless, or are abusing alcohol or drugs. You have serious thoughts of suicide, even with treatment. You have more thoughts of suicide when you are alone. You stop eating, or you begin to smoke cigarettes or drink alcohol. You have questions or concerns about your condition or care. Warning signs of suicide: The following can help you and others recognize that you are struggling:  Talking about your plan for attempting suicide, or wanting to read or write about death or suicide    Cutting yourself, burning your skin with cigarettes, or driving recklessly    Drug or alcohol use, not taking your prescribed medicine, or taking too much    Not wanting to spend time with others or doing things you enjoy, feeling bored, or not wanting anyone to praise you    Changes in your appetite, sleep habits, energy levels, or weight    Feeling angry, or lashing out at others    A need to give away or throw away your belongings    Often skipping work    Suddenly not taking medicine for a mental illness without talking to your healthcare provider    Suddenly not going to therapy    The following are always available to help you:    Contact a suicide prevention organization:     For the 988 Suicide and Crisis Lifeline:     Call or text [phone] 41 [phone] 73 a chat on [email or web address]/chat     Call 0-714.685.3466 (9-056-958-TALK)    For the Suicide Hotline, call 2-961.125.7800 (8-778-LCAHJOU)  What to do if you are having thoughts of suicide:  Call your local emergency number (911 in the [address]). Talk to someone you trust.  Be honest about your thoughts and feelings about suicide. You can call a suicide prevention center if you do not want to talk to someone you know. Contact your therapist.  Your therapist will help you create a crisis plan to follow if you have thoughts about hurting yourself. The plan will include the names of people to call during a crisis. Share your plan with friends and family. Ask someone to stay with you if a crisis occurs. Your healthcare provider can give you a list of therapists if you do not have one. Ask someone to remove items that can be harmful. Do not keep medicines, weapons, or alcohol in your home. Treatment for suicidal thoughts:   Medicines  may be given to prevent mood swings, or to decrease anxiety or depression. You will need to take all medicines as directed. Do not stop taking your medicine without talking to your healthcare provider. A sudden stop can be harmful. It may take 4 to 6 weeks for the medicine to help you feel better. Suicide risk assessment  means healthcare providers will ask questions about your suicide thoughts and plans. They will ask how often you think about suicide and if you have tried it before. They will ask if you have begun to hurt yourself, such as with cutting or reckless driving. They may ask if you have access to weapons or drugs. A safety plan  includes a list of people or groups to contact if you have suicidal feelings again. The list may include friends, family members, a spiritual leader, and others you trust. You may be asked to make a verbal agreement or sign a contract that you will not try to harm yourself. A therapist  can help you identify and change negative feelings or beliefs about yourself.  This may help change the way you feel and act. A therapist can also help you find ways to cope with things that cannot be changed. A therapist can also help if you use alcohol or drugs and need help to quit. Drugs and alcohol can make suicidal feelings worse and make you more likely to act on them. Positive things you can do for yourself:   Exercise as directed. Exercise can lift your mood, give you more energy, and make it easier to sleep. Eat a variety of healthy foods. Healthy foods include fruits, vegetables, whole-grain breads, lean meats, fish, low-fat dairy products, and beans. Try to eat regularly even if you do not feel hungry. Depression can increase from a lack of nutrition or if you are hungry for long periods of time. Create a sleep routine. Try to go to bed and wake up at the same time every day. Let your healthcare provider know if you are having trouble sleeping. Follow up with your doctor or therapist as directed:  Write down your questions so you remember to ask them during your visits. For support and more information:   [address] [place] [address] , [address] [place] [address]  Phone: [phone]  Web Address: Eve OR https://Rapp IT Up/chat/  Suicide Awareness Voices of Education  [address] [facility] [place]. [address]  Phone: 3- 440 - [phone]  Web Address: Propable. org or https://Fusion Telecommunications.org/find-help/international-resources/    © Copyright Merative 2023 Information is for End User's use only and may not be sold, redistributed or otherwise used for commercial purposes. The above information is an  only. It is not intended as medical advice for individual conditions or treatments. Talk to your doctor, nurse or pharmacist before following any medical regimen to see if it is safe and effective for you.

## 2023-11-27 NOTE — ASSESSMENT & PLAN NOTE
Under the management of psychiatry   Follow with [facility]   Current medication: Bupropion 300mg, Haldol 50mg IM injection [id number], Lurasidone 60mg, Oxcarbazepine 300mg BID

## 2023-11-27 NOTE — PROGRESS NOTES
Assessment & Plan     1. Hospitalization within last 30 days    2. Suicidal deliberate poisoning, subsequent encounter  Assessment & Plan:  Patient has a history of this last notable in 2022 when patient took 14 pills of Depakote   Most recently patient took 40 tablets of Flexeril tablets on 11/06/23 11/25/23 Patient presented to the ER after taking 6-8 pills of Trazodone 100mg  Follows with [facility]iatry       3. Intentional overdose, initial encounter [facility])  Assessment & Plan:  Presented to the hospital on 11/06/23 with intentional drug overdose  Admitted to taking 40 tablets of 10 mg Flexeril. Hx schizoaffective disorder. Flexeril was discontinued from medication list.     Psychiatry evaluated, inpatient psychiatric commitment indicated with 302 signed  Transferred to [address] [place] [address] for inpatient admission- discharged on 11/16/23       4. Benign essential hypertension  Assessment & Plan: Today's /82  Current medication:  Losartan 50mg, Lasix 20mg, Clonidine 0.1mg   Medications adjusted during admission, metoprolol and amlodipine discontinues   BP stable today, okay with changes made   Advised patient on symptoms of hypotension & severe HTN  Limit salt-intake & caffeine. DASH diet discussed, minimize stress level  Weight reduction efforts via improved diet & increased exercise        5. Toxic encephalopathy  Assessment & Plan:  11/10/23 Admission   Lethargy & delirium  Related to Flexeril overdose. Coma panel, valproic acid levels, ammonia levels wnl on admission  UDS: + THC. Stable upon discharge   Doing well today 11/27/23        6. Restless leg syndrome  Assessment & Plan:  Current medication: Ropinirole 1mg       7.  Schizoaffective disorder, bipolar type ([address])  Assessment & Plan:  Under the management of psychiatry   Follow with [facility]   Current medication: Bupropion 300mg, Haldol 50mg IM injection [id number], Lurasidone 60mg, Oxcarbazepine 300mg BID            Subjective     Transitional Care Management Review:   Clarissa Jeans is a 47 y.o. female here for TCM follow up. During the TCM phone call patient stated:  TCM Call       Date and time call was made  9/14/2023  8:36 AM    Hospital care reviewed  Records not available    Patient was hospitialized at  [address] [place] [address]    Date of Admission  09/06/23    Date of discharge  09/12/23    Diagnosis  Acute saddle pulmonary embolism    Disposition  Home    Were the patients medications reviewed and updated  Yes    Current Symptoms  None          TCM Call       Post hospital issues  None    Should patient be enrolled in anticoag monitoring? No    Scheduled for follow up? Yes    Did you obtain your prescribed medications  Yes    Do you need help managing your prescriptions or medications  No    Is transportation to your appointment needed  No    I have advised the patient to call PCP with any new or worsening symptoms  [address]  None    Are you recieving any outpatient services  No    What type of services      Are you recieving home care services  No    Are you using any community resources  No    Which resources are you recieving      Current waiver services  No    Have you fallen in the last 12 months  No    Interperter language line needed  No    Counseling  Patient          Presented to the hospital on 11/6/2023 due to intentional drug overdose after a taking 40 tablets of Flexeril 10 mg. Lethargic and delirious but hemodynamically stable on arrival to ED. Psychiatry evaluated patient, recommending inpatient psychiatric treatment with 302 signed. Patient was placed on 1:1 observation, noted multiple attempted elopements, behavioral disturbances and agitation requiring medication adjustment.  Ultimately patient was deemed medically stable for inpatient psychiatric treatment, accepted at [address] [place] [address]    Admitted to the inpatient older adult psychiatric unit on a involuntary 303 commitment basis  She was seen by Dr. Saul Martinez for consultation at [facility] ([place]) and followed during her admission. Does not recall home Depakote regimen patient states that she does not remember her dosages. States that she was taking "1 to 2" tablets of Depakote at night and does not remember how much she was taking in the morning. Reports that she had been experiencing tremors due to the Depakote and states that the plan by her psychiatrist was to taper off this medication and onto a different mood stabilizer. She reports having experienced tremors in her hands and legs and states that she has been experiencing those tremors today. During admission patient tried to hurt herself and was banging her arms on the wall, control team was called and patient was given Ativan prn. Due to this recent episode of agitation, impulsivity, and unstable mood discussed recommendation to start oxcarbazepine 150 mg BID for mood stabilization and to order prn medications for agitation. 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 She denies  thoughts to hurt herself or anyone else, denies any hallucinations upon discharge. Discharged home on 11/10 and presented to the ED on two separate occasions for Abdominal pain (11/10) and chest pain (11/19)     Presented to the hospital on 11/25 with a reported accidental overdose on approximately 6-8 Trazodone 100mg       Review of Systems   Constitutional:  Negative for activity change, chills, fatigue and fever. HENT:  Negative for congestion, ear pain, rhinorrhea, sore throat and trouble swallowing. Eyes:  Negative for pain and visual disturbance. Respiratory:  Negative for cough, chest tightness and shortness of breath. Cardiovascular:  Negative for chest pain, palpitations and leg swelling. Gastrointestinal:  Negative for abdominal pain, constipation, diarrhea, nausea and vomiting.    Genitourinary:  Negative for difficulty urinating, dysuria, hematuria and urgency. Musculoskeletal:  Negative for arthralgias and back pain. Skin:  Negative for color change and rash. Neurological:  Negative for dizziness, seizures, syncope and headaches. Psychiatric/Behavioral:  Positive for dysphoric mood. Negative for decreased concentration and suicidal ideas. The patient is not nervous/anxious. All other systems reviewed and are negative. Objective     /82 (BP Location: Left arm, Patient Position: Sitting, Cuff Size: Adult)   Pulse 85   Temp 98.2 °F (36.8 °C) (Temporal)   Ht 5' 6" (1.676 m)   Wt 136 kg (300 lb)   SpO2 100%   BMI 48.42 kg/m²      Physical Exam  Vitals and nursing note reviewed. Constitutional:       General: She is not in acute distress. Appearance: Normal appearance. She is well-developed. HENT:      Head: Normocephalic and atraumatic. Right Ear: Tympanic membrane, ear canal and external ear normal. There is no impacted cerumen. Left Ear: Tympanic membrane, ear canal and external ear normal. There is no impacted cerumen. Nose: Nose normal.      Mouth/Throat:      Mouth: Mucous membranes are moist.      Pharynx: Oropharynx is clear. Eyes:      Extraocular Movements: Extraocular movements intact. Conjunctiva/sclera: Conjunctivae normal.      Pupils: Pupils are equal, round, and reactive to light. Cardiovascular:      Rate and Rhythm: Normal rate and regular rhythm. Pulses: Normal pulses. Heart sounds: Normal heart sounds. No murmur heard. Pulmonary:      Effort: Pulmonary effort is normal. No respiratory distress. Breath sounds: Normal breath sounds. Abdominal:      General: Bowel sounds are normal.      Palpations: Abdomen is soft. Tenderness: There is no abdominal tenderness. Musculoskeletal:         General: Normal range of motion. Cervical back: Normal range of motion and neck supple. Right lower leg: No edema.       Left lower leg: No edema. Skin:     General: Skin is warm and dry. Capillary Refill: Capillary refill takes less than 2 seconds. Neurological:      General: No focal deficit present. Mental Status: She is alert and oriented to person, place, and time. Mental status is at baseline. Psychiatric:         Mood and Affect: Mood normal.         Behavior: Behavior normal.         Thought Content: Thought content normal.         Judgment: Judgment normal.       Medications have been reviewed by provider in current encounter  Patient Instructions   Suicide Prevention   WHAT YOU NEED TO KNOW:   You may see suicide as the only way to escape emotional or physical pain and suffering. Help is available from people who care about you, and from professionals trained in suicide prevention. Prevention includes everything you and others can do to stop you from taking your life. DISCHARGE INSTRUCTIONS:   Call your local emergency number (911 in the [place]), or ask someone to call if:   You do something on purpose to hurt yourself. You make a plan to attempt suicide. Call your doctor or therapist, or have someone close to you call if:   You act out in anger, are reckless, or are abusing alcohol or drugs. You have serious thoughts of suicide, even with treatment. You have more thoughts of suicide when you are alone. You stop eating, or you begin to smoke cigarettes or drink alcohol. You have questions or concerns about your condition or care. Warning signs of suicide:   The following can help you and others recognize that you are struggling:  Talking about your plan for attempting suicide, or wanting to read or write about death or suicide    Cutting yourself, burning your skin with cigarettes, or driving recklessly    Drug or alcohol use, not taking your prescribed medicine, or taking too much    Not wanting to spend time with others or doing things you enjoy, feeling bored, or not wanting anyone to praise you    Changes in your appetite, sleep habits, energy levels, or weight    Feeling angry, or lashing out at others    A need to give away or throw away your belongings    Often skipping work    Suddenly not taking medicine for a mental illness without talking to your healthcare provider    Suddenly not going to therapy    The following are always available to help you:    Contact a suicide prevention organization: For the 988 Suicide and Crisis Lifeline:     Call or text [phone] [phone] a chat on [email or web address]/chat     Call 3-872.229.3201 (2-466-633-TALK)    For the Suicide Hotline, call 4-618.770.9023 (7-299-QBKMUPC)  What to do if you are having thoughts of suicide:  Call your local emergency number (911 in the [address]). Talk to someone you trust.  Be honest about your thoughts and feelings about suicide. You can call a suicide prevention center if you do not want to talk to someone you know. Contact your therapist.  Your therapist will help you create a crisis plan to follow if you have thoughts about hurting yourself. The plan will include the names of people to call during a crisis. Share your plan with friends and family. Ask someone to stay with you if a crisis occurs. Your healthcare provider can give you a list of therapists if you do not have one. Ask someone to remove items that can be harmful. Do not keep medicines, weapons, or alcohol in your home. Treatment for suicidal thoughts:   Medicines  may be given to prevent mood swings, or to decrease anxiety or depression. You will need to take all medicines as directed. Do not stop taking your medicine without talking to your healthcare provider. A sudden stop can be harmful. It may take 4 to 6 weeks for the medicine to help you feel better. Suicide risk assessment  means healthcare providers will ask questions about your suicide thoughts and plans. They will ask how often you think about suicide and if you have tried it before.  They will ask if you have begun to hurt yourself, such as with cutting or reckless driving. They may ask if you have access to weapons or drugs. A safety plan  includes a list of people or groups to contact if you have suicidal feelings again. The list may include friends, family members, a spiritual leader, and others you trust. You may be asked to make a verbal agreement or sign a contract that you will not try to harm yourself. A therapist  can help you identify and change negative feelings or beliefs about yourself. This may help change the way you feel and act. A therapist can also help you find ways to cope with things that cannot be changed. A therapist can also help if you use alcohol or drugs and need help to quit. Drugs and alcohol can make suicidal feelings worse and make you more likely to act on them. Positive things you can do for yourself:   Exercise as directed. Exercise can lift your mood, give you more energy, and make it easier to sleep. Eat a variety of healthy foods. Healthy foods include fruits, vegetables, whole-grain breads, lean meats, fish, low-fat dairy products, and beans. Try to eat regularly even if you do not feel hungry. Depression can increase from a lack of nutrition or if you are hungry for long periods of time. Create a sleep routine. Try to go to bed and wake up at the same time every day. Let your healthcare provider know if you are having trouble sleeping. Follow up with your doctor or therapist as directed:  Write down your questions so you remember to ask them during your visits. For support and more information:   [address] [place] [address] , [address] [place] [address]  Phone: 5- [phone]  Web Address: Kiva. org OR https://Common Curriculum.CloudEngine/chat/  Suicide Awareness Voices of Education  [address] [facility]. [address] [place]. [address] [facility] , [address] [place] [address]  Phone: 3- 424 - [phone]  Web Address: Readz. org or https://save.org/find-help/international-resources/    © Copyright Brianna Santana 2023 Information is for End User's use only and may not be sold, redistributed or otherwise used for commercial purposes. The above information is an  only. It is not intended as medical advice for individual conditions or treatments. Talk to your doctor, nurse or pharmacist before following any medical regimen to see if it is safe and effective for you.       JHONATAN Santoyo

## 2023-11-27 NOTE — ASSESSMENT & PLAN NOTE
Presented to the hospital on 11/06/23 with intentional drug overdose  Admitted to taking 40 tablets of 10 mg Flexeril. Hx schizoaffective disorder.    Flexeril was discontinued from medication list.     Psychiatry evaluated, inpatient psychiatric commitment indicated with 302 signed  Transferred to [facility] for inpatient admission- discharged on 11/16/23

## 2023-11-27 NOTE — ASSESSMENT & PLAN NOTE
Today's /82  Current medication:  Losartan 50mg, Lasix 20mg, Clonidine 0.1mg   Medications adjusted during admission, metoprolol and amlodipine discontinues   BP stable today, okay with changes made   Advised patient on symptoms of hypotension & severe HTN  Limit salt-intake & caffeine.  DASH diet discussed, minimize stress level  Weight reduction efforts via improved diet & increased exercise

## 2023-11-28 ENCOUNTER — OFFICE VISIT (OUTPATIENT)
Dept: HEMATOLOGY ONCOLOGY | Facility: CLINIC | Age: 53
End: 2023-11-28
Payer: MEDICARE

## 2023-11-28 VITALS
TEMPERATURE: 97.6 F | RESPIRATION RATE: 18 BRPM | SYSTOLIC BLOOD PRESSURE: 142 MMHG | DIASTOLIC BLOOD PRESSURE: 86 MMHG | HEIGHT: 66 IN | WEIGHT: 293 LBS | HEART RATE: 88 BPM | OXYGEN SATURATION: 97 % | BODY MASS INDEX: 47.09 KG/M2

## 2023-11-28 DIAGNOSIS — Z51.81 ANTICOAGULATION MANAGEMENT ENCOUNTER: ICD-10-CM

## 2023-11-28 DIAGNOSIS — Z79.01 ANTICOAGULATION MANAGEMENT ENCOUNTER: ICD-10-CM

## 2023-11-28 DIAGNOSIS — Z86.718 HISTORY OF DVT (DEEP VEIN THROMBOSIS): Primary | ICD-10-CM

## 2023-11-28 DIAGNOSIS — Z86.711 HISTORY OF PULMONARY EMBOLISM: ICD-10-CM

## 2023-11-28 PROCEDURE — 99214 OFFICE O/P EST MOD 30 MIN: CPT

## 2023-11-28 NOTE — PROGRESS NOTES
HEMATOLOGY / [address] [facility] FOLLOW UP NOTE    Primary Care Provider: JHONATAN Greenfield  Referring Provider:    MRN: [id number]  : 1970    Reason for Encounter: Coumadin management           Interval History: Patient presents in the office for Coumadin management for saddle PE on 2023 and left lower extremity DVT. Patient has a PMH significant for suicidal ideations and attempts, major depressive disorder, hypothyroidism, Yan's esophagus, GERD, COPD, hypertension, chronic migraines, osteoarthritis, tremors, schizoaffective disorder bipolar type. Patient has a long history of substance abuse, reports she has been clean for the past couple of months. Patient was placed on Coumadin due to interaction with her psychiatric medication Tegretol. Patient had multiple hospitalizations since last office visit in October. Patient had an incident of she was admitted to the [facility] health unit for 2 weeks due tensional overdose taking 40 tablets of Flexeril on 2023. She presented to the ED on 23 for abdominal pain/constipation on 2023, then on 23 presented to the ED for chest pain, on 2023 presented to the ED again for accidental overdose taking 60 trazodone at home. Patient reports her mental health is not "great at this time."  When asked if patient is suicidal, has a plan, she responded she is not suicidal and does not have a plan, however, her mental state is not stable at this time. during her most recent hospitalization, she reports she did not receive the correct doses of her psychiatric medication. She had a visit with a psychiatrist and and therapist this week who are helping her through this and adjusting her medications. Patient reports she is not cutting herself.      PT/INR:  2023: 2.21  2023: 2.45 -discharge on Coumadin 5 mg daily  11/15/2023: 2.15  2023: 1.87  2023: 1.80  2023: 2.03  11/10/2023: 1.96  2023: 2.97 - admission on Coumadin 10 mg daily    REVIEW OF SYSTEMS:  Please note that a 14-point review of systems was performed to include Constitutional, HEENT, Respiratory, CVS, GI, , Musculoskeletal, Integumentary, Neurologic, Rheumatologic, Endocrinologic, Psychiatric, Lymphatic, and Hematologic/Oncologic systems were reviewed and are negative unless otherwise stated in HPI. Positive and negative findings pertinent to this evaluation are incorporated into the history of present illness. ECOG PS: 1    HPI:  Cristopher Matthews was seen for initial consultation 10/13/2023 regarding saddle pulmonary embolism and left lower extremity DVT. Patient has a significant PMH of suicide attempts, most recent in September 2023 by taking 25 pills of Flexeril, she signed a 201. Patient also has a long history of cutting herself, major depressive disorder, hypothyroidism, varus esophagus, GERD, COPD, HTN, chronic migraines, osteoarthritis, tremors schizoaffective disorder, bipolar type. Patient also has a long history of drug abuse, reports she has been smoking methamphetamine for a "long time, a couple lines a day."  She reports she has been clean for the past month. Shantelle Mccarthy is also in counseling 2 times a week. She restarted her employment hospitalization with [employer] this week. Patient has a history of a hysterectomy "years ago."  Patient has a 10-year history of smoking cigarettes (2 packs a day), quit 5 years ago. She smokes medical marijuana, reports mainly at night to sleep. 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 She used to drink 6 pack of beer a day for 8 years. Has a history of right knee replacement surgery in February. Patient in the office using a cane. Labs:  9/29/2023: Hgb 13.2, MCV 5, WBC 8.72, platelets 727,020, vitamin B12 510, folate 19.2.      Recent hospitalizations:     On 9/1/23, patient presented to the ED for increased shortness of breath and chest discomfort that she has never felt before. Patient reports she snorted 2 lines of methamphetamine that day due to some stressors. CT chest PE study showed a large saddle embolus extending into the right and left lower lobar and bilateral lower lobe segmental pulmonary arterial branches with evidence of right heart strain. Subtle lobulated contour of the liver may suggest component of hepatic cirrhosis. On 9/2/23, venous duplex revealed  a nonocclusive DVT in the left lower extremity in the distal femoral, popliteal and 1 peroneal veins. On 9/6/2023, patient return to the ED for increased shortness of breath, no relief with BiPAP. Patient was placed on a heparin drip. On 9 7 patient's PTT was greater than 210 and heparin GTT was placed on hold. Per pharmacist, there is a major drug drug interaction between Eliquis and patient's Tegretol XR which leads to decreased concentration of Eliquis. Patient was then started on therapeutic Lovenox. He was then bridged to Coumadin and was discharged on 9/12/2023.  9/7/23: CT chest PE study: Persistent saddle pulmonary embolism extending into the bilateral lower lobar to segmental branches, clot burden is not significantly changed since prior exam      On 9/15/23, patient returned to the ED for difficulty breathing, she was discharged the same day with albuterol as needed and recommended BiPAP nightly. On 9/20/2023, patient returned to the ED for ongoing shortness of breath, chest pain and left lower extremity pain. CT chest PE study:    Interval resolution of previously seen saddle embolus and right-sided emboli. There is decreased thrombus in the distal left main pulmonary artery extending partially into left upper and left lower lobe segmental arteries. No new emboli appreciated. On 9/26/23, patient presented to the ED with intentional overdose by taking 2510 mg pills of Flexeril. She had reported that she was a recovering meth user and been clean for 26 days at that time. Patient was admitted to behavioral health unit was discharged 10/6/2023. PROBLEM LIST:  Patient Active Problem List   Diagnosis    Yan's esophagus determined by biopsy    Benign essential hypertension    Schizoaffective disorder, bipolar type ([facility])    Chronic low back pain    Edema    Family history of renal cancer    Hypothyroidism    MAG (obstructive sleep apnea)    Overactive bladder    Urinary incontinence    Major depressive disorder    Sjogren's syndrome (HCC)    COPD (chronic obstructive pulmonary disease) ([facility])    Mixed hyperlipidemia    BMI 45.0-49.9, adult ([address])    Memory difficulties    History of COVID-19    Medical clearance for psychiatric admission    Chronic tension-type headache, intractable    Potential for cognitive impairment    Mood disorder ([facility])    Substance abuse ([facility])    Cognitive impairment    Contusion of elbow    Diarrhea    Ecchymosis    Anxiety    Borderline personality disorder ([address])    Platelets decreased ([facility])    Knee pain, right    Suicidal deliberate poisoning ([address])    Intentional self-harm by unspecified sharp object, sequela ([address])    Chronic eczematous otitis externa of both ears    Chronic migraine without aura without status migrainosus, not intractable    Bilateral leg edema    Cervicalgia    History of pulmonary embolism    Elevated troponin    Tremors of nervous system    Intentional overdose ([facility])    Polysubstance abuse ([address])    Restless leg syndrome    Acute saddle pulmonary embolism ([facility])    Acute deep vein thrombosis of lower leg, left ([facility])    Lymphedema    Toxic encephalopathy    Grief       Assessment / Plan: We reviewed patient's labs during hospitalization, they were 3 instances where she was subtherapeutic. Since the hospitalization, patient reports she has not missed any doses. She is currently on Coumadin 5 mg daily. We will continue with weekly labs due to stable INR.   She will continue to get her blood work every Wednesday, she will get the next one tomorrow. I will call her with the results. For patient we will do a close follow-up of 3 months. She is agreeable with plan. Extensive emotional support provided. She is aware to contact us if she has any additional questions/concerns. I spent 30 minutes on chart review, face to face counseling time, coordination of care and documentation. Past Medical History:   has a past medical history of Anxiety, Arthritis, Asthma, Yan's esophagus, Bipolar affective disorder ([address]), Cannabis abuse with unspecified 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[address]), Chronic pain disorder, COPD (chronic obstructive pulmonary disease) ([address]), CPAP (continuous positive airway pressure) dependence, DDD (degenerative disc disease), lumbar (04/09/2015), Degenerative disc disease at L5-S1 level, Deliberate self-cutting, Depression (09/16/2008), Disease of thyroid gland, MARTINEZ (dyspnea on exertion), Drug overdose (10/28/2008), Dysphagia, Dyspnea, Edema, Family history of blood clots, GERD (gastroesophageal reflux disease), Headache(784.0), Headache, tension-type, Hemorrhage of rectum and anus (10/02/2017), High blood pressure, History of COVID-19 (12/30/2020), Hyperlipidemia, Hypertension, Knee pain, bilateral, Medial epicondylitis of elbow, left (04/03/2018), Medial epicondylitis of right elbow (07/17/2023), Medical marijuana use, Memory loss, Migraines, Obesity, Overactive bladder, Primary osteoarthritis of both knees (08/08/2018), Pulmonary emboli ([address]), Restrictive lung disease (02/01/2017), Sjogren's disease ([address]), Sleep apnea, Stress incontinence, Suicidal ideations, Teeth missing, Tremor, Visual impairment, and Wears glasses. PAST SURGICAL HISTORY:   has a past surgical history that includes REPAIR LACERATION (Left); Colonoscopy; TONSILECTOMY AND ADNOIDECTOMY; Esophagogastroduodenoscopy; Vein ligation (Bilateral);  Elbow surgery (Left); Dilation and curettage of uterus; pr sphincterotomy anal division sphincter spx (N/A, 08/31/2018); Carpal tunnel release (Left); Knee arthroscopy (Bilateral); Cholecystectomy (05/2003); Foot surgery (Left); [place] tooth extraction; pr rpr umbilical hrna 5 yrs/> reducible (N/A, 04/24/2019); Hysterectomy; Oophorectomy (Left, 10/2015); Reduction mammaplasty (Bilateral, 1999); Rotator cuff repair (Left); pr gastrocnemius recession (Left, 02/24/2021); US guided msk procedure (04/22/2021); Breast cyst excision (Right, 1989); Replacement total knee (Right); pr tnot elbow lateral/medial debride open tdn rpr (Left, 09/20/2022); Hernia repair; and Vascular surgery.     CURRENT MEDICATIONS  Current Outpatient Medications   Medication Sig Dispense Refill    atoMOXetine (STRATTERA) 40 mg capsule Take 1 capsule (40 mg total) by mouth daily 30 capsule 1    buPROPion (WELLBUTRIN XL) 300 mg 24 hr tablet Take 1 tablet (300 mg total) by mouth daily 30 tablet 1    cholecalciferol (VITAMIN D3) 1,000 units tablet Take 1 tablet (1,000 Units total) by mouth daily 30 tablet 1    cloNIDine (CATAPRES) 0.1 mg tablet Take 1 tablet (0.1 mg total) by mouth every 12 (twelve) hours 60 tablet 0    furosemide (LASIX) 20 mg tablet Take 1 tablet (20 mg total) by mouth daily Do not start before November 17, 2023. 30 tablet 1    haloperidol decanoate (Haldol Decanoate) 50 mg/mL injection 1 mL (50 mg total) by Intramuscular - haloperidol decanoate route every 28 days Do not start before November 22, 2023. 1 mL 0    levothyroxine (Euthyrox) 125 mcg tablet Take 1 tablet (125 mcg total) by mouth daily in the early morning 30 tablet 1    losartan (COZAAR) 100 MG tablet Take 1 tablet (100 mg total) by mouth daily 30 tablet 1    lurasidone 60 MG TABS Take 1 tablet (60 mg total) by mouth daily with breakfast Do not start before November 17, 2023. 30 tablet 1    melatonin 3 mg Take 1 tablet (3 mg total) by mouth daily at bedtime 30 tablet 1    naltrexone (REVIA) 50 mg tablet Take 1 tablet (50 mg total) by mouth daily 30 tablet 1    OXcarbazepine (TRILEPTAL) 300 mg tablet Take 1 tablet (300 mg total) by mouth every 12 (twelve) hours 60 tablet 1    rOPINIRole (REQUIP) 1 mg tablet 1 mg      topiramate (TOPAMAX) 200 MG tablet Take 1 tablet (200 mg total) by mouth 2 (two) times a day 60 tablet 1    trospium chloride (SANCTURA) 20 mg tablet Take 20 mg by mouth 2 (two) times a day      warfarin (COUMADIN) 5 mg tablet Take 1 tablet (5 mg total) by mouth daily 30 tablet 0     No current facility-administered medications for this visit. [unfilled]    SOCIAL HISTORY:   reports that she quit smoking about 5 years ago. Her smoking use included cigarettes. She started smoking about 38 years ago. She has a 60.00 pack-year smoking history. She has never used smokeless tobacco. She reports that she does not currently use alcohol. She reports current drug use. Drugs: Marijuana and Methamphetamines. FAMILY HISTORY:  family history includes Alcohol abuse in her sister; Arthritis in her mother; Asthma in her sister; Cancer in her mother; Colon cancer in her family, maternal uncle, maternal uncle, and paternal uncle; Depression in her mother; Diabetes in her mother; Kidney cancer in her mother; No Known Problems in her father, maternal grandfather, maternal grandmother, paternal grandfather, paternal grandmother, and sister; Psychiatric Illness in her mother; Stroke in her mother. ALLERGIES:  is allergic to percocet [oxycodone-acetaminophen], povidone iodine, and chlorhexidine. Physical Exam:  Vital Signs:   Visit Vitals  /86 (BP Location: Right arm, Patient Position: Sitting, Cuff Size: Adult)   Pulse 88   Temp 97.6 °F (36.4 °C) (Temporal)   Resp 18   Ht 5' 6" (1.676 m)   Wt (!) 138 kg (305 lb)   SpO2 97%   BMI 49.23 kg/m²   OB Status Hysterectomy   Smoking Status Former   BSA 2.39 m²     Body mass index is 49.23 kg/m². Body surface area is 2.39 meters squared.     GEN: Alert, awake oriented x3, in no acute distress  HEENT- No pallor, icterus, cyanosis, no oral mucosal lesions,   LAD - no palpable cervical, clavicle, axillary, inguinal LAD  Heart- normal S1 S2, regular rate and rhythm, No murmur, rubs.    Lungs- clear breathing sound bilateral.   Abdomen- soft, Non tender, bowel sounds present  Extremities- No cyanosis, clubbing, edema  Neuro- No focal neurological deficit    Labs:  Lab Results   Component Value Date    WBC 10.48 (H) 11/25/2023    HGB 15.4 11/25/2023    HCT 46.5 (H) 11/25/2023    MCV 91 11/25/2023     11/25/2023     Lab Results   Component Value Date    SODIUM 139 11/25/2023    K 3.8 11/25/2023     11/25/2023    CO2 25 11/25/2023    AGAP 8 11/25/2023    BUN 19 11/25/2023    CREATININE 0.64 11/25/2023    GLUC 105 11/25/2023    GLUF 96 11/11/2023    CALCIUM 9.3 11/25/2023    AST 14 11/25/2023    ALT 20 11/25/2023    ALKPHOS 57 11/25/2023    TP 7.2 11/25/2023    TBILI 0.20 11/25/2023    EGFR 102 11/25/2023

## 2023-11-29 ENCOUNTER — TRANSCRIBE ORDERS (OUTPATIENT)
Dept: LAB | Facility: CLINIC | Age: 53
End: 2023-11-29

## 2023-11-29 ENCOUNTER — TELEPHONE (OUTPATIENT)
Dept: HEMATOLOGY ONCOLOGY | Facility: CLINIC | Age: 53
End: 2023-11-29

## 2023-11-29 ENCOUNTER — CONSULT (OUTPATIENT)
Dept: FAMILY MEDICINE CLINIC | Facility: CLINIC | Age: 53
End: 2023-11-29
Payer: MEDICARE

## 2023-11-29 ENCOUNTER — APPOINTMENT (OUTPATIENT)
Dept: LAB | Facility: CLINIC | Age: 53
DRG: 885 | End: 2023-11-29
Payer: MEDICARE

## 2023-11-29 VITALS
OXYGEN SATURATION: 100 % | TEMPERATURE: 98.2 F | HEIGHT: 66 IN | WEIGHT: 293 LBS | HEART RATE: 56 BPM | SYSTOLIC BLOOD PRESSURE: 128 MMHG | BODY MASS INDEX: 47.09 KG/M2 | DIASTOLIC BLOOD PRESSURE: 90 MMHG

## 2023-11-29 DIAGNOSIS — F45.8 ANXIETY HYPERVENTILATION: ICD-10-CM

## 2023-11-29 DIAGNOSIS — R25.1 TREMORS OF NERVOUS SYSTEM: ICD-10-CM

## 2023-11-29 DIAGNOSIS — F32.A DEPRESSIVE TYPE PSYCHOSIS: Primary | ICD-10-CM

## 2023-11-29 DIAGNOSIS — F40.240 CLAUSTROPHOBIA: ICD-10-CM

## 2023-11-29 DIAGNOSIS — F42.2 MIXED OBSESSIONAL THOUGHTS AND ACTS: ICD-10-CM

## 2023-11-29 DIAGNOSIS — Z01.818 PREOP EXAMINATION: Primary | ICD-10-CM

## 2023-11-29 DIAGNOSIS — F41.9 ANXIETY HYPERVENTILATION: ICD-10-CM

## 2023-11-29 DIAGNOSIS — F60.3 EXPLOSIVE PERSONALITY DISORDER (HCC): ICD-10-CM

## 2023-11-29 DIAGNOSIS — F32.A DEPRESSIVE TYPE PSYCHOSIS: ICD-10-CM

## 2023-11-29 DIAGNOSIS — I10 BENIGN ESSENTIAL HYPERTENSION: Chronic | ICD-10-CM

## 2023-11-29 PROCEDURE — 99214 OFFICE O/P EST MOD 30 MIN: CPT

## 2023-11-29 NOTE — LETTER
2023     Ana Alcantara, [address] [place]    Patient: Fany Pressley   YOB: 1970   Date of Visit: 2023       Dear Dr. Nancy House: Thank you for referring Nesha Mcfadden to me for evaluation. Below are my notes for this consultation. If you have questions, please do not hesitate to call me. I look forward to following your patient along with you. Sincerely,        Verner Law, CRNP        CC: No Recipients    Verner Law, [address] [place] [address]  2023 10:22 AM  Cosign Needed  [facility] PRE-OPERATIVE EVALUATION  [facility]    NAME: Fany Pressley  AGE: 47 y.o. SEX: female  : 1970     DATE: 2023    [facility] Pre-Operative Evaluation      Chief Complaint: Pre-operative Evaluation     Surgery: MRI with Sedation   Anticipated Date of Surgery: 23  Referring Provider: Ana Alcantara MD        History of Present Illness:     Fany Pressley is a 47 y.o. female who presents to the office today for a preoperative consultation at the request of surgeon, Nancy House MD, who plans on performing MRI with sedation on 23. Planned anesthesia is IV sedation. Patient has a bleeding risk of: recent abnormal bleeding (Pulmonary Embolism). Patient does not have objections to receiving blood products if needed. Current anti-platelet/anti-coagulation medications that the patient is prescribed includes: Warfarin (Coumadin or Linnell Searing).       Assessment of Chronic Conditions:   - COPD: albuterol  - Hypertension: well controlled on medications      Assessment of Cardiac Risk:  Reports unstable or severe angina or MI in the last 6 weeks or history of stent placement in the last year   Denies decompensated heart failure (e.g. New onset heart failure, NYHA functional class IV heart failure, or worsening existing heart failure)  Denies significant arrhythmias such as high grade AV block, symptomatic ventricular arrhythmia, newly recognized ventricular tachycardia, supraventricular tachycardia with resting heart rate >100, or symptomatic bradycardia  Denies severe heart valve disease including aortic stenosis or symptomatic mitral stenosis     Exercise Capacity:  Able to walk 4 blocks without symptoms?: Yes  Able to walk 2 flights without symptoms?: Yes    Prior Anesthesia Reactions: No     Personal history of venous thromboembolic disease? Yes recent PE on coumadin    History of steroid use for >2 weeks within last year? No         Review of Systems:     Review of Systems   Constitutional:  Negative for activity change, chills, fatigue and fever. HENT:  Negative for congestion, ear pain, rhinorrhea, sore throat and trouble swallowing. Eyes:  Negative for pain and visual disturbance. Respiratory:  Negative for cough, chest tightness and shortness of breath. Cardiovascular:  Negative for chest pain, palpitations and leg swelling. Gastrointestinal:  Negative for abdominal pain, constipation, diarrhea, nausea and vomiting. Genitourinary:  Negative for difficulty urinating, dysuria, hematuria and urgency. Musculoskeletal:  Negative for arthralgias and back pain. Skin:  Negative for color change and rash. Neurological:  Negative for dizziness, seizures, syncope and headaches. Psychiatric/Behavioral:  Negative for dysphoric mood. The patient is not nervous/anxious. All other systems reviewed and are negative.       Current Problem List:     Patient Active Problem List   Diagnosis   • Yan's esophagus determined by biopsy   • Benign essential hypertension   • Schizoaffective disorder, bipolar type ([address])   • Chronic low back pain   • Edema   • Family history of renal cancer   • Hypothyroidism   • MAG (obstructive sleep apnea)   • Overactive bladder   • Urinary incontinence   • Major depressive disorder   • Sjogren's syndrome ([address])   • COPD (chronic obstructive pulmonary disease) ([address])   • Mixed hyperlipidemia   • BMI 45.0-49.9, adult ([address])   • Memory difficulties   • History of COVID-19   • Medical clearance for psychiatric admission   • Chronic tension-type headache, intractable   • Potential for cognitive impairment   • Mood disorder (HCC)   • Substance abuse ([address])   • Cognitive impairment   • Contusion of elbow   • Diarrhea   • Ecchymosis   • Anxiety   • Borderline personality disorder ([facility])   • Platelets decreased ([facility])   • Knee pain, right   • Suicidal deliberate poisoning ([address])   • Intentional self-harm by unspecified sharp object, sequela ([facility])   • Chronic eczematous otitis externa of both ears   • Chronic migraine without aura without status migrainosus, not intractable   • Bilateral leg edema   • Cervicalgia   • History of pulmonary embolism   • Elevated troponin   • Tremors of nervous system   • Intentional overdose ([facility])   • Polysubstance abuse ([facility])   • Restless leg syndrome   • Acute saddle pulmonary embolism ([facility])   • Acute deep vein thrombosis of lower leg, left ([facility])   • Lymphedema   • Toxic encephalopathy   • Grief       Allergies: Allergies   Allergen Reactions   • Percocet [Oxycodone-Acetaminophen] Headache     Severe headaches   (denies issues with Tylenol)   • Povidone Iodine Rash     Unsure if betadine or gauze post surgical. Got rash on leg.    Has  Had itchy rashes after every surgery prep and IV insertion   • Chlorhexidine Rash     Per pt "able to use the liquid soap--thinkd reaction from the sponges or wipes"       Current Medications:       Current Outpatient Medications:   •  atoMOXetine (STRATTERA) 40 mg capsule, Take 1 capsule (40 mg total) by mouth daily, Disp: 30 capsule, Rfl: 1  •  buPROPion (WELLBUTRIN XL) 300 mg 24 hr tablet, Take 1 tablet (300 mg total) by mouth daily, Disp: 30 tablet, Rfl: 1  •  cholecalciferol (VITAMIN D3) 1,000 units tablet, Take 1 tablet (1,000 Units total) by mouth daily, Disp: 30 tablet, Rfl: 1  •  cloNIDine (CATAPRES) 0.1 mg tablet, Take 1 tablet (0.1 mg total) by mouth every 12 (twelve) hours, Disp: 60 tablet, Rfl: 0  •  furosemide (LASIX) 20 mg tablet, Take 1 tablet (20 mg total) by mouth daily Do not start before November 17, 2023., Disp: 30 tablet, Rfl: 1  •  haloperidol decanoate (Haldol Decanoate) 50 mg/mL injection, 1 mL (50 mg total) by Intramuscular - haloperidol decanoate route every 28 days Do not start before November 22, 2023., Disp: 1 mL, Rfl: 0  •  levothyroxine (Euthyrox) 125 mcg tablet, Take 1 tablet (125 mcg total) by mouth daily in the early morning, Disp: 30 tablet, Rfl: 1  •  losartan (COZAAR) 100 MG tablet, Take 1 tablet (100 mg total) by mouth daily, Disp: 30 tablet, Rfl: 1  •  lurasidone 60 MG TABS, Take 1 tablet (60 mg total) by mouth daily with breakfast Do not start before November 17, 2023., Disp: 30 tablet, Rfl: 1  •  melatonin 3 mg, Take 1 tablet (3 mg total) by mouth daily at bedtime, Disp: 30 tablet, Rfl: 1  •  naltrexone (REVIA) 50 mg tablet, Take 1 tablet (50 mg total) by mouth daily, Disp: 30 tablet, Rfl: 1  •  OXcarbazepine (TRILEPTAL) 300 mg tablet, Take 1 tablet (300 mg total) by mouth every 12 (twelve) hours, Disp: 60 tablet, Rfl: 1  •  rOPINIRole (REQUIP) 1 mg tablet, 1 mg, Disp: , Rfl:   •  topiramate (TOPAMAX) 200 MG tablet, Take 1 tablet (200 mg total) by mouth 2 (two) times a day, Disp: 60 tablet, Rfl: 1  •  trospium chloride (SANCTURA) 20 mg tablet, Take 20 mg by mouth 2 (two) times a day, Disp: , Rfl:   •  warfarin (COUMADIN) 5 mg tablet, Take 1 tablet (5 mg total) by mouth daily, Disp: 30 tablet, Rfl: 0    Past Medical History:       Past Medical History:   Diagnosis Date   • Anxiety    • Arthritis     oa cassandra knees   • Asthma     good control- no medications   • Yan's esophagus    • Bipolar affective disorder (HCC)    • Cannabis abuse with unspecified 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HCC)    • Chronic pain disorder     lumbar   • COPD (chronic obstructive pulmonary disease) ([address])    • CPAP (continuous positive airway pressure) dependence    • DDD (degenerative disc disease), lumbar 04/09/2015   • Degenerative disc disease at L5-S1 level    • Deliberate self-cutting     9/15/22per pt has not done recently-- does see a therapist and psychiatrist regularly   • Depression 09/16/2008   • Disease of thyroid gland     hypo   • MARTINEZ (dyspnea on exertion)     per pt "has had this with exertion and not new"   • Drug overdose 10/28/2008    suicide attempt and again drug overdose 7/2022--[facility] -Medical floor and than transferred to [facility]   • Dysphagia    • Dyspnea    • Edema     BLE   • Family history of blood clots    • GERD (gastroesophageal reflux disease)    • Headache(784.0)    • Headache, tension-type    • Hemorrhage of rectum and anus 10/02/2017    Formatting of this note might be different from the original.  Added automatically from request for surgery [id number]   • High blood pressure    • History of COVID-19 12/30/2020    Symptoms started on 12/30/2020 and then got worse. Today she is feeling a little bit better. She is a candidate for monoclonal antibody infusion and set for 1/6/21 @ 1pm at [facility]. 01/07/21 - telemedicine -    • Hyperlipidemia    • Hypertension    • Knee pain, bilateral     Especially right   • Medial epicondylitis of elbow, left 04/03/2018    Formatting of this note might be different from the original.  Added automatically from request for surgery [id number]   • Medial epicondylitis of right elbow 07/17/2023   • Medical marijuana use    • Memory loss    • Migraines    • Obesity    • Overactive bladder    • Primary osteoarthritis of both knees 08/08/2018   • Pulmonary emboli [facility])    • Restrictive lung disease 02/01/2017    Last Assessment & Plan:   Formatting of this note might be different from the original.  I reviewed this problem throughout the rest of the note. Please refer to the other assessments for details.    • Sjogren's disease ([address])    • Sleep apnea • Stress incontinence    • Suicidal ideations    • Teeth missing    • Tremor     per pt Tremors of Right Leg and both Arms   • Visual impairment    • Wears glasses         Past Surgical History:   Procedure Laterality Date   • BREAST CYST EXCISION Right 1989   • CARPAL TUNNEL RELEASE Left    • CHOLECYSTECTOMY  05/2003    Laparoscopic   • COLONOSCOPY      01/12/2009   • DILATION AND CURETTAGE OF UTERUS     • ELBOW SURGERY Left     x2.  No hardware   • ESOPHAGOGASTRODUODENOSCOPY     • FOOT SURGERY Left     Plantar fasciotomy   • HERNIA REPAIR     • HYSTERECTOMY      laporoscopic, partial   • KNEE ARTHROSCOPY Bilateral    • OOPHORECTOMY Left 10/2015   • TN GASTROCNEMIUS RECESSION Left 02/24/2021    Procedure: RECESSION GASTROC OPEN;  Surgeon: Marissa Rivers DPM;  Location: [address] [place] [address] MAIN OR;  Service: Podiatry   • TN RPR UMBILICAL HRNA 5 YRS/> REDUCIBLE N/A 04/24/2019    Procedure: REPAIR HERNIA UMBILICAL LAPAROSCOPIC W/ ROBOTICS;  Surgeon: Cat Borrego MD;  Location: [facility] Main OR;  Service: General   • TN SPHINCTEROTOMY ANAL DIVISION SPHINCTER 44 [facility] N/A 08/31/2018    Procedure: EUA, LEFT PARTIAL INTERNAL SPHINCTEROTOMY;  Surgeon: Cathleen Ren MD;  Location: [address] [place] [address] MAIN OR;  Service: Colorectal   • TN TNOT ELBOW LATERAL/MEDIAL DEBRIDE OPEN TDN RPR Left 09/20/2022    Procedure: RELEASE EPICONDYLAR ELBOW MEDIAL;  Surgeon: Darlene Quiroga MD;  Location: [facility] MAIN OR;  Service: Orthopedics   • REDUCTION MAMMAPLASTY Bilateral 1999   • REPAIR LACERATION Left     left hand-5/18/2009   • REPLACEMENT TOTAL KNEE Right    • ROTATOR CUFF REPAIR Left    • TONSILECTOMY AND ADNOIDECTOMY     • US GUIDED MSK PROCEDURE  04/22/2021   • VASCULAR SURGERY     • VEIN LIGATION Bilateral    • WISDOM TOOTH EXTRACTION          Family History   Problem Relation Age of Onset   • Kidney cancer Mother    • Diabetes Mother    • Depression Mother    • Stroke Mother    • Arthritis Mother    • Cancer Mother    • Psychiatric Illness Mother    • No Known Problems Father    • No Known Problems Sister    • No Known Problems Maternal Grandmother    • No Known Problems Maternal Grandfather    • No Known Problems Paternal Grandmother    • No Known Problems Paternal Grandfather    • Colon cancer Maternal Uncle    • Colon cancer Maternal Uncle    • Colon cancer Paternal Uncle    • Colon cancer Family    • Alcohol abuse Sister    • Asthma Sister         Social History     Socioeconomic History   • Marital status: Single     Spouse name: Not on file   • Number of children: Not on file   • Years of education: Not on file   • Highest education level: Not on file   Occupational History   • Not on file   Tobacco Use   • Smoking status: Former     Packs/day: 2.00     Years: 30.00     Total pack years: 60.00     Types: Cigarettes     Start date: 1985     Quit date: 2018     Years since quittin.9   • Smokeless tobacco: Never   • Tobacco comments:     Started at age 14. Vaping Use   • Vaping Use: Never used   Substance and Sexual Activity   • Alcohol use: Not Currently     Comment: Recovering alcoholic   • Drug use: Yes     Types: Marijuana, Methamphetamines     Comment: 2 months off meth   • Sexual activity: Not Currently     Partners: Male     Comment: defer   Other Topics Concern   • Not on file   Social History Narrative   • Not on file     Social Determinants of Health     Financial Resource Strain: High Risk (2022)    Overall Financial Resource Strain (CARDIA)    • Difficulty of Paying Living Expenses: Hard   Food Insecurity: No Food Insecurity (2023)    Hunger Vital Sign    • Worried About Running Out of Food in the Last Year: Never true    • Ran Out of Food in the Last Year: Never true   Transportation Needs: No Transportation Needs (2023)    PRAPARE - Transportation    • Lack of Transportation (Medical): No    • Lack of Transportation (Non-Medical):  No   Physical Activity: Not on file   Stress: Not on file   Social Connections: Not on file Intimate Partner Violence: Not on file   Housing Stability: Unknown (11/7/2023)    Housing Stability Vital Sign    • Unable to Pay for Housing in the Last Year: No    • Number of Places Lived in the Last Year: Not on file    • Unstable Housing in the Last Year: No        Physical Exam:     /90 (BP Location: Left arm, Patient Position: Sitting, Cuff Size: Large)   Pulse 56   Temp 98.2 °F (36.8 °C) (Temporal)   Ht 5' 6" (1.676 m)   Wt (!) 138 kg (305 lb)   SpO2 100%   BMI 49.23 kg/m²     Physical Exam  Vitals and nursing note reviewed. Constitutional:       Appearance: Normal appearance. She is normal weight. HENT:      Head: Normocephalic. Right Ear: Tympanic membrane, ear canal and external ear normal. There is no impacted cerumen. Left Ear: Tympanic membrane, ear canal and external ear normal. There is no impacted cerumen. Nose: Nose normal.      Mouth/Throat:      Mouth: Mucous membranes are moist.      Pharynx: Oropharynx is clear. Eyes:      Extraocular Movements: Extraocular movements intact. Conjunctiva/sclera: Conjunctivae normal.      Pupils: Pupils are equal, round, and reactive to light. Cardiovascular:      Rate and Rhythm: Normal rate and regular rhythm. Pulses: Normal pulses. Heart sounds: Normal heart sounds. Pulmonary:      Effort: Pulmonary effort is normal.      Breath sounds: Normal breath sounds. Abdominal:      General: Bowel sounds are normal. There is no distension. Palpations: Abdomen is soft. Tenderness: There is no abdominal tenderness. Musculoskeletal:         General: Normal range of motion. Cervical back: Normal range of motion. Skin:     General: Skin is warm. Capillary Refill: Capillary refill takes less than 2 seconds. Neurological:      General: No focal deficit present. Mental Status: She is alert and oriented to person, place, and time. Mental status is at baseline.    Psychiatric:         Mood and Affect: Mood normal.         Behavior: Behavior normal.         Thought Content: Thought content normal.         Judgment: Judgment normal.          Data:     Pre-operative work-up    Laboratory Results: I have personally reviewed the pertinent laboratory results/reports      EKG: I have personally reviewed pertinent reports. Chest x-ray: I have personally reviewed pertinent reports. Previous cardiopulmonary studies within the past year:  Echocardiogram: 09/23  Cardiac Catheterization: n/a  Stress Test: n/a  Pulmonary Function Testing:n/a      Assessment & Recommendations:     1. Preop examination        2. Claustrophobia        3. Tremors of nervous system        4. Benign essential hypertension            Pre-Op Evaluation Assessment  47 y.o. female with planned surgery: MRI with sedation. Known risk factors for perioperative complications: Chronic pulmonary disease. Current medications which may produce withdrawal symptoms if withheld perioperatively: oxcarbazepine, lurasidone, haldol, clonidine . Pre-Op Evaluation Plan  1. Further preoperative workup as follows:   - None; no further preoperative work-up is required    2. Medication Management/Recommendations:   - Patient should continue antihypertensive medications up through and including the day of surgery. - Patient should hold diuretic medication the day of surgery. - Warfarin management: Discontinue warfarin 11/30/23 days before surgery and resume 12/02/23    3. Prophylaxis for cardiac events with perioperative beta-blockers: not indicated. 4. Patient requires further consultation with: None    Clearance  Patient is CLEARED for surgery without any additional cardiac testing.      Parish Paige, [address] [place] [id number]  Phone#  [phone]  Fax#  [phone]

## 2023-11-29 NOTE — PROGRESS NOTES
[facility] PRE-OPERATIVE EVALUATION  [facility] PHYSICIAN GROUP - [facility]    NAME: Brittany Smoker  AGE: 47 y.o. SEX: female  : 1970     DATE: 2023    [facility] Pre-Operative Evaluation      Chief Complaint: Pre-operative Evaluation     Surgery: MRI with Sedation   Anticipated Date of Surgery: 23  Referring Provider: Lavinia Rodas MD        History of Present Illness:     Brittany Smoker is a 47 y.o. female who presents to the office today for a preoperative consultation at the request of surgeon, Danita Quarles MD, who plans on performing MRI with sedation on 23. Planned anesthesia is IV sedation. Patient has a bleeding risk of: recent abnormal bleeding (Pulmonary Embolism). Patient does not have objections to receiving blood products if needed. Current anti-platelet/anti-coagulation medications that the patient is prescribed includes: Warfarin (Coumadin or [place]). Assessment of Chronic Conditions:   - COPD: albuterol  - Hypertension: well controlled on medications      Assessment of Cardiac Risk:  Reports unstable or severe angina or MI in the last 6 weeks or history of stent placement in the last year   Denies decompensated heart failure (e.g. New onset heart failure, NYHA functional class IV heart failure, or worsening existing heart failure)  Denies significant arrhythmias such as high grade AV block, symptomatic ventricular arrhythmia, newly recognized ventricular tachycardia, supraventricular tachycardia with resting heart rate >100, or symptomatic bradycardia  Denies severe heart valve disease including aortic stenosis or symptomatic mitral stenosis     Exercise Capacity:  Able to walk 4 blocks without symptoms?: Yes  Able to walk 2 flights without symptoms?: Yes    Prior Anesthesia Reactions: No     Personal history of venous thromboembolic disease?  Yes recent PE on coumadin    History of steroid use for >2 weeks within last year? No         Review of Systems:     Review of Systems   Constitutional:  Negative for activity change, chills, fatigue and fever. HENT:  Negative for congestion, ear pain, rhinorrhea, sore throat and trouble swallowing. Eyes:  Negative for pain and visual disturbance. Respiratory:  Negative for cough, chest tightness and shortness of breath. Cardiovascular:  Negative for chest pain, palpitations and leg swelling. Gastrointestinal:  Negative for abdominal pain, constipation, diarrhea, nausea and vomiting. Genitourinary:  Negative for difficulty urinating, dysuria, hematuria and urgency. Musculoskeletal:  Negative for arthralgias and back pain. Skin:  Negative for color change and rash. Neurological:  Negative for dizziness, seizures, syncope and headaches. Psychiatric/Behavioral:  Negative for dysphoric mood. The patient is not nervous/anxious. All other systems reviewed and are negative.       Current Problem List:     Patient Active Problem List   Diagnosis    Yan's esophagus determined by biopsy    Benign essential hypertension    Schizoaffective disorder, bipolar type ([address])    Chronic low back pain    Edema    Family history of renal cancer    Hypothyroidism    MAG (obstructive sleep apnea)    Overactive bladder    Urinary incontinence    Major depressive disorder    Sjogren's syndrome (HCC)    COPD (chronic obstructive pulmonary disease) ([address])    Mixed hyperlipidemia    BMI 45.0-49.9, adult ([address])    Memory difficulties    History of COVID-19    Medical clearance for psychiatric admission    Chronic tension-type headache, intractable    Potential for cognitive impairment    Mood disorder (HCC)    Substance abuse ([address])    Cognitive impairment    Contusion of elbow    Diarrhea    Ecchymosis    Anxiety    Borderline personality disorder ([address])    Platelets decreased ([address])    Knee pain, right    Suicidal deliberate poisoning ([address])    Intentional self-harm by unspecified sharp object, sequela (HCC)    Chronic eczematous otitis externa of both ears    Chronic migraine without aura without status migrainosus, not intractable    Bilateral leg edema    Cervicalgia    History of pulmonary embolism    Elevated troponin    Tremors of nervous system    Intentional overdose (HCC)    Polysubstance abuse (HCC)    Restless leg syndrome    Acute saddle pulmonary embolism (HCC)    Acute deep vein thrombosis of lower leg, left (HCC)    Lymphedema    Toxic encephalopathy    Grief       Allergies: Allergies   Allergen Reactions    Percocet [Oxycodone-Acetaminophen] Headache     Severe headaches   (denies issues with Tylenol)    Povidone Iodine Rash     Unsure if betadine or gauze post surgical. Got rash on leg.    Has  Had itchy rashes after every surgery prep and IV insertion    Chlorhexidine Rash     Per pt "able to use the liquid soap--thinkd reaction from the sponges or wipes"       Current Medications:       Current Outpatient Medications:     atoMOXetine (STRATTERA) 40 mg capsule, Take 1 capsule (40 mg total) by mouth daily, Disp: 30 capsule, Rfl: 1    buPROPion (WELLBUTRIN XL) 300 mg 24 hr tablet, Take 1 tablet (300 mg total) by mouth daily, Disp: 30 tablet, Rfl: 1    cholecalciferol (VITAMIN D3) 1,000 units tablet, Take 1 tablet (1,000 Units total) by mouth daily, Disp: 30 tablet, Rfl: 1    cloNIDine (CATAPRES) 0.1 mg tablet, Take 1 tablet (0.1 mg total) by mouth every 12 (twelve) hours, Disp: 60 tablet, Rfl: 0    furosemide (LASIX) 20 mg tablet, Take 1 tablet (20 mg total) by mouth daily Do not start before November 17, 2023., Disp: 30 tablet, Rfl: 1    haloperidol decanoate (Haldol Decanoate) 50 mg/mL injection, 1 mL (50 mg total) by Intramuscular - haloperidol decanoate route every 28 days Do not start before November 22, 2023., Disp: 1 mL, Rfl: 0    levothyroxine (Euthyrox) 125 mcg tablet, Take 1 tablet (125 mcg total) by mouth daily in the early morning, Disp: 30 tablet, Rfl: 1    losartan (COZAAR) 100 MG tablet, Take 1 tablet (100 mg total) by mouth daily, Disp: 30 tablet, Rfl: 1    lurasidone 60 MG TABS, Take 1 tablet (60 mg total) by mouth daily with breakfast Do not start before November 17, 2023., Disp: 30 tablet, Rfl: 1    melatonin 3 mg, Take 1 tablet (3 mg total) by mouth daily at bedtime, Disp: 30 tablet, Rfl: 1    naltrexone (REVIA) 50 mg tablet, Take 1 tablet (50 mg total) by mouth daily, Disp: 30 tablet, Rfl: 1    OXcarbazepine (TRILEPTAL) 300 mg tablet, Take 1 tablet (300 mg total) by mouth every 12 (twelve) hours, Disp: 60 tablet, Rfl: 1    rOPINIRole (REQUIP) 1 mg tablet, 1 mg, Disp: , Rfl:     topiramate (TOPAMAX) 200 MG tablet, Take 1 tablet (200 mg total) by mouth 2 (two) times a day, Disp: 60 tablet, Rfl: 1    trospium chloride (SANCTURA) 20 mg tablet, Take 20 mg by mouth 2 (two) times a day, Disp: , Rfl:     warfarin (COUMADIN) 5 mg tablet, Take 1 tablet (5 mg total) by mouth daily, Disp: 30 tablet, Rfl: 0    Past Medical History:       Past Medical History:   Diagnosis Date    Anxiety     Arthritis     oa cassandra knees    Asthma     good control- no medications    Yan's esophagus     Bipolar affective disorder (HCC)     Cannabis abuse with unspecified 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HCC)     Chronic pain disorder     lumbar    COPD (chronic obstructive pulmonary disease) (HCC)     CPAP (continuous positive airway pressure) dependence     DDD (degenerative disc disease), lumbar 04/09/2015    Degenerative disc disease at L5-S1 level     Deliberate self-cutting     9/15/22per pt has not done recently-- does see a therapist and psychiatrist regularly    Depression 09/16/2008    Disease of thyroid gland     hypo    MARTINEZ (dyspnea on exertion)     per pt "has had this with exertion and not new"    Drug overdose 10/28/2008    suicide attempt and again drug overdose 7/2022-- [facility] floor and than transferred to [facility]    Dysphagia     Dyspnea     Edema     BLE    Family history of blood clots     GERD (gastroesophageal reflux disease)     Headache(784.0)     Headache, tension-type     Hemorrhage of rectum and anus 10/02/2017    Formatting of this note might be different from the original.  Added automatically from request for surgery [id number]    High blood pressure     History of COVID-19 12/30/2020    Symptoms started on 12/30/2020 and then got worse. Today she is feeling a little bit better. She is a candidate for monoclonal antibody infusion and set for 1/6/21 @ 1pm at [facility]. 01/07/21 - telemedicine -     Hyperlipidemia     Hypertension     Knee pain, bilateral     Especially right    Medial epicondylitis of elbow, left 04/03/2018    Formatting of this note might be different from the original.  Added automatically from request for surgery [id number]    Medial epicondylitis of right elbow 07/17/2023    Medical marijuana use     Memory loss     Migraines     Obesity     Overactive bladder     Primary osteoarthritis of both knees 08/08/2018    Pulmonary emboli (HCC)     Restrictive lung disease 02/01/2017    Last Assessment & Plan:   Formatting of this note might be different from the original.  I reviewed this problem throughout the rest of the note. Please refer to the other assessments for details. Sjogren's disease ([address])     Sleep apnea     Stress incontinence     Suicidal ideations     Teeth missing     Tremor     per pt Tremors of Right Leg and both Arms    Visual impairment     Wears glasses         Past Surgical History:   Procedure Laterality Date    BREAST CYST EXCISION Right 1989    CARPAL TUNNEL RELEASE Left     CHOLECYSTECTOMY  05/2003    Laparoscopic    COLONOSCOPY      01/12/2009    DILATION AND CURETTAGE OF UTERUS      ELBOW SURGERY Left     x2.  No hardware    ESOPHAGOGASTRODUODENOSCOPY      FOOT SURGERY Left     Plantar fasciotomy    HERNIA REPAIR      HYSTERECTOMY      laporoscopic, partial    KNEE ARTHROSCOPY Bilateral     OOPHORECTOMY Left 10/2015    NC GASTROCNEMIUS RECESSION Left 02/24/2021    Procedure: RECESSION GASTROC OPEN;  Surgeon: Maddy Wylie DPM;  Location: [address] [place] [address] MAIN OR;  Service: Podiatry    ME RPR UMBILICAL HRNA 5 YRS/> REDUCIBLE N/A 04/24/2019    Procedure: REPAIR HERNIA UMBILICAL LAPAROSCOPIC W/ ROBOTICS;  Surgeon: Yehuda Urban MD;  Location: [facility] Main OR;  Service: General    ME SPHINCTEROTOMY ANAL DIVISION SPHINCTER SPX N/A 08/31/2018    Procedure: EUA, LEFT PARTIAL INTERNAL SPHINCTEROTOMY;  Surgeon: Jose Antonio Draper MD;  Location: [address] [place] [address] MAIN OR;  Service: Colorectal    ME TNOT ELBOW LATERAL/MEDIAL DEBRIDE OPEN TDN RPR Left 09/20/2022    Procedure: RELEASE EPICONDYLAR ELBOW MEDIAL;  Surgeon: Kaye Chaney MD;  [facility] MAIN OR;  Service: Orthopedics    REDUCTION MAMMAPLASTY Bilateral 1999    REPAIR LACERATION Left     left hand-5/18/2009    REPLACEMENT TOTAL KNEE Right     ROTATOR CUFF REPAIR Left     TONSILECTOMY AND ADNOIDECTOMY      US GUIDED MSK PROCEDURE  04/22/2021    VASCULAR SURGERY      VEIN LIGATION Bilateral     WISDOM TOOTH EXTRACTION          Family History   Problem Relation Age of Onset    Kidney cancer Mother     Diabetes Mother     Depression Mother     Stroke Mother     Arthritis Mother     Cancer Mother     Psychiatric Illness Mother     No Known Problems Father     No Known Problems Sister     No Known Problems Maternal Grandmother     No Known Problems Maternal Grandfather     No Known Problems Paternal Grandmother     No Known Problems Paternal Grandfather     Colon cancer Maternal Uncle     Colon cancer Maternal Uncle     Colon cancer Paternal Uncle     Colon cancer Family     Alcohol abuse Sister     Asthma Sister         Social History     Socioeconomic History    Marital status: Single     Spouse name: Not on file    Number of children: Not on file    Years of education: Not on file    Highest education level: Not on file   Occupational History    Not on file   Tobacco Use    Smoking status: Former     Packs/day: 2.00     Years: 30.00     Total pack years: 60.00     Types: Cigarettes     Start date: 1985     Quit date: 2018     Years since quittin.9    Smokeless tobacco: Never    Tobacco comments:     Started at age 14. Vaping Use    Vaping Use: Never used   Substance and Sexual Activity    Alcohol use: Not Currently     Comment: Recovering alcoholic    Drug use: Yes     Types: Marijuana, Methamphetamines     Comment: 2 months off meth    Sexual activity: Not Currently     Partners: Male     Comment: defer   Other Topics Concern    Not on file   Social History Narrative    Not on file     Social Determinants of Health     Financial Resource Strain: High Risk (2022)    Overall Financial Resource Strain (CARDIA)     Difficulty of Paying Living Expenses: Hard   Food Insecurity: No Food Insecurity (2023)    Hunger Vital Sign     Worried About Running Out of Food in the Last Year: Never true     Ran Out of Food in the Last Year: Never true   Transportation Needs: No Transportation Needs (2023)    PRAPARE - Transportation     Lack of Transportation (Medical): No     Lack of Transportation (Non-Medical): No   Physical Activity: Not on file   Stress: Not on file   Social Connections: Not on file   Intimate Partner Violence: Not on file   Housing Stability: Unknown (2023)    Housing Stability Vital Sign     Unable to Pay for Housing in the Last Year: No     Number of Places Lived in the Last Year: Not on file     Unstable Housing in the Last Year: No        Physical Exam:     /90 (BP Location: Left arm, Patient Position: Sitting, Cuff Size: Large)   Pulse 56   Temp 98.2 °F (36.8 °C) (Temporal)   Ht 5' 6" (1.676 m)   Wt (!) 138 kg (305 lb)   SpO2 100%   BMI 49.23 kg/m²     Physical Exam  Vitals and nursing note reviewed. Constitutional:       Appearance: Normal appearance. She is normal weight. HENT:      Head: Normocephalic.       Right Ear: Tympanic membrane, ear canal and external ear normal. There is no impacted cerumen. Left Ear: Tympanic membrane, ear canal and external ear normal. There is no impacted cerumen. Nose: Nose normal.      Mouth/Throat:      Mouth: Mucous membranes are moist.      Pharynx: Oropharynx is clear. Eyes:      Extraocular Movements: Extraocular movements intact. Conjunctiva/sclera: Conjunctivae normal.      Pupils: Pupils are equal, round, and reactive to light. Cardiovascular:      Rate and Rhythm: Normal rate and regular rhythm. Pulses: Normal pulses. Heart sounds: Normal heart sounds. Pulmonary:      Effort: Pulmonary effort is normal.      Breath sounds: Normal breath sounds. Abdominal:      General: Bowel sounds are normal. There is no distension. Palpations: Abdomen is soft. Tenderness: There is no abdominal tenderness. Musculoskeletal:         General: Normal range of motion. Cervical back: Normal range of motion. Skin:     General: Skin is warm. Capillary Refill: Capillary refill takes less than 2 seconds. Neurological:      General: No focal deficit present. Mental Status: She is alert and oriented to person, place, and time. Mental status is at baseline. Psychiatric:         Mood and Affect: Mood normal.         Behavior: Behavior normal.         Thought Content: Thought content normal.         Judgment: Judgment normal.          Data:     Pre-operative work-up    Laboratory Results: I have personally reviewed the pertinent laboratory results/reports      EKG: I have personally reviewed pertinent reports. Chest x-ray: I have personally reviewed pertinent reports. Previous cardiopulmonary studies within the past year:  Echocardiogram: 09/23  Cardiac Catheterization: n/a  Stress Test: n/a  Pulmonary Function Testing:n/a      Assessment & Recommendations:     1. Preop examination        2. Claustrophobia        3. Tremors of nervous system        4.  Benign essential hypertension Pre-Op Evaluation Assessment  47 y.o. female with planned surgery: MRI with sedation. Known risk factors for perioperative complications: Chronic pulmonary disease. Current medications which may produce withdrawal symptoms if withheld perioperatively: oxcarbazepine, lurasidone, haldol, clonidine . Pre-Op Evaluation Plan  1. Further preoperative workup as follows:   - None; no further preoperative work-up is required    2. Medication Management/Recommendations:   - Patient should continue antihypertensive medications up through and including the day of surgery. - Patient should hold diuretic medication the day of surgery. - Warfarin management: Discontinue warfarin 11/30/23 days before surgery and resume 12/02/23    3. Prophylaxis for cardiac events with perioperative beta-blockers: not indicated. 4. Patient requires further consultation with: None    Clearance  Patient is CLEARED for surgery without any additional cardiac testing.      Lyndsay Avalos, [address] [address]  Phone#  [phone]  Fax#  [phone]

## 2023-11-30 ENCOUNTER — TELEPHONE (OUTPATIENT)
Dept: HEMATOLOGY ONCOLOGY | Facility: CLINIC | Age: 53
End: 2023-11-30

## 2023-11-30 ENCOUNTER — TELEPHONE (OUTPATIENT)
Dept: PAIN MEDICINE | Facility: CLINIC | Age: 53
End: 2023-11-30

## 2023-11-30 PROBLEM — Z86.718 HISTORY OF DVT (DEEP VEIN THROMBOSIS): Status: ACTIVE | Noted: 2023-11-30

## 2023-11-30 RX ORDER — WARFARIN SODIUM 5 MG/1
5 TABLET ORAL
Qty: 90 TABLET | Refills: 0 | Status: ON HOLD | OUTPATIENT
Start: 2023-11-30 | End: 2024-02-28

## 2023-11-30 NOTE — TELEPHONE ENCOUNTER
[facility] for Addie Screen that her INR is 1.91, I'd like her to increase her dose to Coumadin 7 mg daily. We will recheck her INR next Wednesday. Asked patient to call me back to let me know she received my message. Direct teams number provided.

## 2023-11-30 NOTE — TELEPHONE ENCOUNTER
Fortunately I do not have any recommendations.   I have no control over the department of radiology schedule

## 2023-11-30 NOTE — TELEPHONE ENCOUNTER
Caller: Jose Antonio Garza     Doctor: Amber Weir     Reason for call: patient waited 3 months to get this MRI with sedation 12/1 due to her tremors they just called her to reschedule for another 3 months out because of the sedation they made a mistake and scheduled her at the wrong location. She was in tears stating this is not fair I waited my three months make somebody else wait!  Is there anything we can do to help her get a sooner appt 2/14 is now her new appt     Call back#: [phone]

## 2023-11-30 NOTE — TELEPHONE ENCOUNTER
Reached out to the [facility] and to see if she received my message last night, she reports she did not. Informed her that her INR was 1.91, is subtherapeutic. Therefore, I would like her to start taking Coumadin 7 mg daily, 5 mg tablet plus a 2 mg tablet. Patient voiced understanding and she will repeat her INR next week. I will call her with the results.

## 2023-12-01 ENCOUNTER — APPOINTMENT (EMERGENCY)
Dept: RADIOLOGY | Facility: HOSPITAL | Age: 53
End: 2023-12-01
Payer: MEDICARE

## 2023-12-01 ENCOUNTER — PATIENT OUTREACH (OUTPATIENT)
Dept: CASE MANAGEMENT | Facility: OTHER | Age: 53
End: 2023-12-01

## 2023-12-01 ENCOUNTER — HOSPITAL ENCOUNTER (INPATIENT)
Facility: HOSPITAL | Age: 53
LOS: 62 days | Discharge: HOME | DRG: 885 | End: 2024-02-01
Attending: STUDENT IN AN ORGANIZED HEALTH CARE EDUCATION/TRAINING PROGRAM | Admitting: STUDENT IN AN ORGANIZED HEALTH CARE EDUCATION/TRAINING PROGRAM
Payer: MEDICARE

## 2023-12-01 ENCOUNTER — HOSPITAL ENCOUNTER (EMERGENCY)
Facility: HOSPITAL | Age: 53
End: 2023-12-01
Attending: EMERGENCY MEDICINE
Payer: MEDICARE

## 2023-12-01 ENCOUNTER — HOSPITAL ENCOUNTER (OUTPATIENT)
Dept: MRI IMAGING | Facility: HOSPITAL | Age: 53
Discharge: HOME | End: 2023-12-01
Attending: ANESTHESIOLOGY

## 2023-12-01 VITALS
DIASTOLIC BLOOD PRESSURE: 99 MMHG | WEIGHT: 293 LBS | RESPIRATION RATE: 14 BRPM | OXYGEN SATURATION: 96 % | TEMPERATURE: 97.9 F | BODY MASS INDEX: 47.09 KG/M2 | HEART RATE: 84 BPM | HEIGHT: 66 IN | SYSTOLIC BLOOD PRESSURE: 167 MMHG

## 2023-12-01 DIAGNOSIS — I10 HYPERTENSION: ICD-10-CM

## 2023-12-01 DIAGNOSIS — L60.0 INGROWN TOENAIL OF LEFT FOOT: ICD-10-CM

## 2023-12-01 DIAGNOSIS — F25.0 SCHIZOAFFECTIVE DISORDER, BIPOLAR TYPE (HCC): Primary | Chronic | ICD-10-CM

## 2023-12-01 DIAGNOSIS — Z00.8 MEDICAL CLEARANCE FOR PSYCHIATRIC ADMISSION: ICD-10-CM

## 2023-12-01 DIAGNOSIS — R32 URINARY INCONTINENCE, UNSPECIFIED TYPE: ICD-10-CM

## 2023-12-01 DIAGNOSIS — T50.902A INTENTIONAL DRUG OVERDOSE, INITIAL ENCOUNTER (HCC): Primary | ICD-10-CM

## 2023-12-01 DIAGNOSIS — I10 BENIGN ESSENTIAL HYPERTENSION: Chronic | ICD-10-CM

## 2023-12-01 DIAGNOSIS — Z86.711 HISTORY OF PULMONARY EMBOLISM: ICD-10-CM

## 2023-12-01 DIAGNOSIS — R41.89 COGNITIVE IMPAIRMENT: ICD-10-CM

## 2023-12-01 DIAGNOSIS — E66.01 MORBID OBESITY WITH BMI OF 45.0-49.9, ADULT (HCC): ICD-10-CM

## 2023-12-01 DIAGNOSIS — E03.9 ACQUIRED HYPOTHYROIDISM: Chronic | ICD-10-CM

## 2023-12-01 DIAGNOSIS — L60.0 INGROWN TOENAIL: ICD-10-CM

## 2023-12-01 LAB
ALBUMIN SERPL BCP-MCNC: 3.8 G/DL (ref 3.5–5)
ALP SERPL-CCNC: 62 U/L (ref 34–104)
ALT SERPL W P-5'-P-CCNC: 15 U/L (ref 7–52)
AMPHETAMINES SERPL QL SCN: NEGATIVE
ANION GAP SERPL CALCULATED.3IONS-SCNC: 9 MMOL/L
APAP SERPL-MCNC: <2 UG/ML (ref 10–20)
APTT PPP: 40 SECONDS (ref 23–37)
AST SERPL W P-5'-P-CCNC: 13 U/L (ref 13–39)
BARBITURATES UR QL: NEGATIVE
BASOPHILS # BLD AUTO: 0.04 THOUSANDS/ÂΜL (ref 0–0.1)
BASOPHILS NFR BLD AUTO: 0 % (ref 0–1)
BENZODIAZ UR QL: NEGATIVE
BILIRUB SERPL-MCNC: 0.25 MG/DL (ref 0.2–1)
BUN SERPL-MCNC: 15 MG/DL (ref 5–25)
CALCIUM SERPL-MCNC: 9.3 MG/DL (ref 8.4–10.2)
CARDIAC TROPONIN I PNL SERPL HS: 4 NG/L
CHLORIDE SERPL-SCNC: 105 MMOL/L (ref 96–108)
CO2 SERPL-SCNC: 23 MMOL/L (ref 21–32)
COCAINE UR QL: NEGATIVE
CREAT SERPL-MCNC: 0.64 MG/DL (ref 0.6–1.3)
EOSINOPHIL # BLD AUTO: 0.07 THOUSAND/ÂΜL (ref 0–0.61)
EOSINOPHIL NFR BLD AUTO: 1 % (ref 0–6)
ERYTHROCYTE [DISTWIDTH] IN BLOOD BY AUTOMATED COUNT: 14.2 % (ref 11.6–15.1)
ETHANOL SERPL-MCNC: <10 MG/DL
GFR SERPL CREATININE-BSD FRML MDRD: 102 ML/MIN/1.73SQ M
GLUCOSE SERPL-MCNC: 88 MG/DL (ref 65–140)
GLUCOSE SERPL-MCNC: 93 MG/DL (ref 65–140)
HCT VFR BLD AUTO: 43.4 % (ref 34.8–46.1)
HGB BLD-MCNC: 14.2 G/DL (ref 11.5–15.4)
IMM GRANULOCYTES # BLD AUTO: 0.2 THOUSAND/UL (ref 0–0.2)
IMM GRANULOCYTES NFR BLD AUTO: 2 % (ref 0–2)
INR PPP: 1.98 (ref 0.84–1.19)
LYMPHOCYTES # BLD AUTO: 2.32 THOUSANDS/ÂΜL (ref 0.6–4.47)
LYMPHOCYTES NFR BLD AUTO: 25 % (ref 14–44)
MAGNESIUM SERPL-MCNC: 2.1 MG/DL (ref 1.9–2.7)
MCH RBC QN AUTO: 29.9 PG (ref 26.8–34.3)
MCHC RBC AUTO-ENTMCNC: 32.7 G/DL (ref 31.4–37.4)
MCV RBC AUTO: 91 FL (ref 82–98)
METHADONE UR QL: NEGATIVE
MONOCYTES # BLD AUTO: 0.79 THOUSAND/ÂΜL (ref 0.17–1.22)
MONOCYTES NFR BLD AUTO: 8 % (ref 4–12)
NEUTROPHILS # BLD AUTO: 6.05 THOUSANDS/ÂΜL (ref 1.85–7.62)
NEUTS SEG NFR BLD AUTO: 64 % (ref 43–75)
NRBC BLD AUTO-RTO: 0 /100 WBCS
OPIATES UR QL SCN: NEGATIVE
OXYCODONE+OXYMORPHONE UR QL SCN: NEGATIVE
PCP UR QL: NEGATIVE
PLATELET # BLD AUTO: 181 THOUSANDS/UL (ref 149–390)
PMV BLD AUTO: 10.2 FL (ref 8.9–12.7)
POTASSIUM SERPL-SCNC: 3.8 MMOL/L (ref 3.5–5.3)
PROT SERPL-MCNC: 7 G/DL (ref 6.4–8.4)
PROTHROMBIN TIME: 22.1 SECONDS (ref 11.6–14.5)
RBC # BLD AUTO: 4.75 MILLION/UL (ref 3.81–5.12)
SALICYLATES SERPL-MCNC: <5 MG/DL (ref 3–20)
SODIUM SERPL-SCNC: 137 MMOL/L (ref 135–147)
THC UR QL: POSITIVE
WBC # BLD AUTO: 9.47 THOUSAND/UL (ref 4.31–10.16)

## 2023-12-01 PROCEDURE — 85610 PROTHROMBIN TIME: CPT | Performed by: EMERGENCY MEDICINE

## 2023-12-01 PROCEDURE — 93005 ELECTROCARDIOGRAM TRACING: CPT

## 2023-12-01 PROCEDURE — 36415 COLL VENOUS BLD VENIPUNCTURE: CPT | Performed by: EMERGENCY MEDICINE

## 2023-12-01 PROCEDURE — 82077 ASSAY SPEC XCP UR&BREATH IA: CPT | Performed by: EMERGENCY MEDICINE

## 2023-12-01 PROCEDURE — 80179 DRUG ASSAY SALICYLATE: CPT | Performed by: EMERGENCY MEDICINE

## 2023-12-01 PROCEDURE — 85730 THROMBOPLASTIN TIME PARTIAL: CPT | Performed by: EMERGENCY MEDICINE

## 2023-12-01 PROCEDURE — 83735 ASSAY OF MAGNESIUM: CPT | Performed by: EMERGENCY MEDICINE

## 2023-12-01 PROCEDURE — 80307 DRUG TEST PRSMV CHEM ANLYZR: CPT | Performed by: EMERGENCY MEDICINE

## 2023-12-01 PROCEDURE — 82948 REAGENT STRIP/BLOOD GLUCOSE: CPT

## 2023-12-01 PROCEDURE — 99285 EMERGENCY DEPT VISIT HI MDM: CPT

## 2023-12-01 PROCEDURE — 99285 EMERGENCY DEPT VISIT HI MDM: CPT | Performed by: EMERGENCY MEDICINE

## 2023-12-01 PROCEDURE — 80143 DRUG ASSAY ACETAMINOPHEN: CPT | Performed by: EMERGENCY MEDICINE

## 2023-12-01 PROCEDURE — 80053 COMPREHEN METABOLIC PANEL: CPT | Performed by: EMERGENCY MEDICINE

## 2023-12-01 PROCEDURE — 84484 ASSAY OF TROPONIN QUANT: CPT | Performed by: EMERGENCY MEDICINE

## 2023-12-01 PROCEDURE — 85025 COMPLETE CBC W/AUTO DIFF WBC: CPT | Performed by: EMERGENCY MEDICINE

## 2023-12-01 PROCEDURE — 71045 X-RAY EXAM CHEST 1 VIEW: CPT

## 2023-12-01 RX ORDER — OXCARBAZEPINE 150 MG/1
300 TABLET, FILM COATED ORAL EVERY 12 HOURS SCHEDULED
Status: DISCONTINUED | OUTPATIENT
Start: 2023-12-01 | End: 2023-12-01 | Stop reason: HOSPADM

## 2023-12-01 RX ORDER — NALTREXONE HYDROCHLORIDE 50 MG/1
50 TABLET, FILM COATED ORAL DAILY
Status: DISCONTINUED | OUTPATIENT
Start: 2023-12-01 | End: 2023-12-01 | Stop reason: HOSPADM

## 2023-12-01 RX ORDER — RISPERIDONE 1 MG/1
1 TABLET ORAL
Status: CANCELLED | OUTPATIENT
Start: 2023-12-01

## 2023-12-01 RX ORDER — IBUPROFEN 200 MG
200 TABLET ORAL EVERY 6 HOURS PRN
Status: CANCELLED | OUTPATIENT
Start: 2023-12-01

## 2023-12-01 RX ORDER — TOPIRAMATE 100 MG/1
200 TABLET, FILM COATED ORAL 2 TIMES DAILY
Status: DISCONTINUED | OUTPATIENT
Start: 2023-12-01 | End: 2023-12-01 | Stop reason: HOSPADM

## 2023-12-01 RX ORDER — IBUPROFEN 400 MG/1
200 TABLET ORAL EVERY 6 HOURS PRN
Status: DISCONTINUED | OUTPATIENT
Start: 2023-12-01 | End: 2024-02-01 | Stop reason: HOSPADM

## 2023-12-01 RX ORDER — AMOXICILLIN 250 MG
1 CAPSULE ORAL DAILY PRN
Status: CANCELLED | OUTPATIENT
Start: 2023-12-01

## 2023-12-01 RX ORDER — ATOMOXETINE 40 MG/1
40 CAPSULE ORAL DAILY
Status: DISCONTINUED | OUTPATIENT
Start: 2023-12-01 | End: 2023-12-01 | Stop reason: HOSPADM

## 2023-12-01 RX ORDER — HYDROXYZINE HYDROCHLORIDE 25 MG/1
25 TABLET, FILM COATED ORAL
Status: DISCONTINUED | OUTPATIENT
Start: 2023-12-01 | End: 2024-02-01 | Stop reason: HOSPADM

## 2023-12-01 RX ORDER — LORAZEPAM 2 MG/ML
1 INJECTION INTRAMUSCULAR
Status: DISCONTINUED | OUTPATIENT
Start: 2023-12-01 | End: 2024-02-01 | Stop reason: HOSPADM

## 2023-12-01 RX ORDER — IBUPROFEN 600 MG/1
600 TABLET ORAL EVERY 8 HOURS PRN
Status: CANCELLED | OUTPATIENT
Start: 2023-12-01

## 2023-12-01 RX ORDER — HYDROXYZINE HYDROCHLORIDE 25 MG/1
25 TABLET, FILM COATED ORAL
Status: CANCELLED | OUTPATIENT
Start: 2023-12-01

## 2023-12-01 RX ORDER — POLYETHYLENE GLYCOL 3350 17 G/17G
17 POWDER, FOR SOLUTION ORAL DAILY PRN
Status: DISCONTINUED | OUTPATIENT
Start: 2023-12-01 | End: 2024-02-01 | Stop reason: HOSPADM

## 2023-12-01 RX ORDER — RISPERIDONE 0.25 MG/1
0.25 TABLET ORAL
Status: DISCONTINUED | OUTPATIENT
Start: 2023-12-01 | End: 2023-12-06

## 2023-12-01 RX ORDER — RISPERIDONE 0.25 MG/1
0.5 TABLET ORAL
Status: CANCELLED | OUTPATIENT
Start: 2023-12-01

## 2023-12-01 RX ORDER — CLONIDINE HYDROCHLORIDE 0.1 MG/1
0.1 TABLET ORAL EVERY 12 HOURS SCHEDULED
Status: DISCONTINUED | OUTPATIENT
Start: 2023-12-01 | End: 2023-12-01 | Stop reason: HOSPADM

## 2023-12-01 RX ORDER — ROPINIROLE 2 MG/1
2 TABLET, FILM COATED ORAL
COMMUNITY
End: 2024-02-01

## 2023-12-01 RX ORDER — RISPERIDONE 0.25 MG/1
0.25 TABLET ORAL
Status: CANCELLED | OUTPATIENT
Start: 2023-12-01

## 2023-12-01 RX ORDER — LANOLIN ALCOHOL/MO/W.PET/CERES
3 CREAM (GRAM) TOPICAL
Status: CANCELLED | OUTPATIENT
Start: 2023-12-01

## 2023-12-01 RX ORDER — TRAZODONE HYDROCHLORIDE 50 MG/1
50 TABLET ORAL
Status: DISCONTINUED | OUTPATIENT
Start: 2023-12-01 | End: 2024-02-01 | Stop reason: HOSPADM

## 2023-12-01 RX ORDER — LOSARTAN POTASSIUM 50 MG/1
100 TABLET ORAL DAILY
Status: DISCONTINUED | OUTPATIENT
Start: 2023-12-01 | End: 2023-12-01 | Stop reason: HOSPADM

## 2023-12-01 RX ORDER — HALOPERIDOL 5 MG/ML
5 INJECTION INTRAMUSCULAR
Status: DISCONTINUED | OUTPATIENT
Start: 2023-12-01 | End: 2023-12-06

## 2023-12-01 RX ORDER — IBUPROFEN 400 MG/1
400 TABLET ORAL EVERY 6 HOURS PRN
Status: DISCONTINUED | OUTPATIENT
Start: 2023-12-01 | End: 2024-02-01 | Stop reason: HOSPADM

## 2023-12-01 RX ORDER — LEVOTHYROXINE SODIUM 0.12 MG/1
125 TABLET ORAL
Status: DISCONTINUED | OUTPATIENT
Start: 2023-12-01 | End: 2023-12-01 | Stop reason: HOSPADM

## 2023-12-01 RX ORDER — HALOPERIDOL 5 MG/ML
5 INJECTION INTRAMUSCULAR
Status: CANCELLED | OUTPATIENT
Start: 2023-12-01

## 2023-12-01 RX ORDER — RISPERIDONE 1 MG/1
1 TABLET ORAL
Status: DISCONTINUED | OUTPATIENT
Start: 2023-12-01 | End: 2023-12-06

## 2023-12-01 RX ORDER — FUROSEMIDE 40 MG/1
20 TABLET ORAL DAILY
Status: DISCONTINUED | OUTPATIENT
Start: 2023-12-01 | End: 2023-12-01 | Stop reason: HOSPADM

## 2023-12-01 RX ORDER — LORAZEPAM 1 MG/1
1 TABLET ORAL
Status: DISCONTINUED | OUTPATIENT
Start: 2023-12-01 | End: 2024-02-01 | Stop reason: HOSPADM

## 2023-12-01 RX ORDER — HYDROXYZINE 50 MG/1
50 TABLET, FILM COATED ORAL
Status: DISCONTINUED | OUTPATIENT
Start: 2023-12-01 | End: 2024-02-01 | Stop reason: HOSPADM

## 2023-12-01 RX ORDER — TRAZODONE HYDROCHLORIDE 50 MG/1
50 TABLET ORAL
Status: CANCELLED | OUTPATIENT
Start: 2023-12-01

## 2023-12-01 RX ORDER — LANOLIN ALCOHOL/MO/W.PET/CERES
3 CREAM (GRAM) TOPICAL
Status: DISCONTINUED | OUTPATIENT
Start: 2023-12-01 | End: 2023-12-02

## 2023-12-01 RX ORDER — LORAZEPAM 1 MG/1
1 TABLET ORAL
Status: CANCELLED | OUTPATIENT
Start: 2023-12-01

## 2023-12-01 RX ORDER — POLYETHYLENE GLYCOL 3350 17 G/17G
17 POWDER, FOR SOLUTION ORAL DAILY PRN
Status: CANCELLED | OUTPATIENT
Start: 2023-12-01

## 2023-12-01 RX ORDER — ROPINIROLE 1 MG/1
1 TABLET, FILM COATED ORAL
Status: DISCONTINUED | OUTPATIENT
Start: 2023-12-01 | End: 2023-12-01 | Stop reason: HOSPADM

## 2023-12-01 RX ORDER — LURASIDONE HYDROCHLORIDE 20 MG/1
60 TABLET, FILM COATED ORAL
Status: DISCONTINUED | OUTPATIENT
Start: 2023-12-02 | End: 2023-12-01 | Stop reason: HOSPADM

## 2023-12-01 RX ORDER — LORAZEPAM 2 MG/ML
1 INJECTION INTRAMUSCULAR
Status: CANCELLED | OUTPATIENT
Start: 2023-12-01

## 2023-12-01 RX ORDER — IBUPROFEN 400 MG/1
400 TABLET ORAL EVERY 6 HOURS PRN
Status: CANCELLED | OUTPATIENT
Start: 2023-12-01

## 2023-12-01 RX ORDER — RISPERIDONE 0.5 MG/1
0.5 TABLET ORAL
Status: DISCONTINUED | OUTPATIENT
Start: 2023-12-01 | End: 2023-12-06

## 2023-12-01 RX ORDER — AMOXICILLIN 250 MG
1 CAPSULE ORAL DAILY PRN
Status: DISCONTINUED | OUTPATIENT
Start: 2023-12-01 | End: 2024-02-01 | Stop reason: HOSPADM

## 2023-12-01 RX ORDER — LANOLIN ALCOHOL/MO/W.PET/CERES
3 CREAM (GRAM) TOPICAL
Status: DISCONTINUED | OUTPATIENT
Start: 2023-12-01 | End: 2023-12-01 | Stop reason: HOSPADM

## 2023-12-01 RX ORDER — IBUPROFEN 600 MG/1
600 TABLET ORAL EVERY 8 HOURS PRN
Status: DISCONTINUED | OUTPATIENT
Start: 2023-12-01 | End: 2024-02-01 | Stop reason: HOSPADM

## 2023-12-01 RX ORDER — HYDROXYZINE HYDROCHLORIDE 25 MG/1
50 TABLET, FILM COATED ORAL
Status: CANCELLED | OUTPATIENT
Start: 2023-12-01

## 2023-12-01 RX ORDER — MELATONIN
1000 DAILY
Status: DISCONTINUED | OUTPATIENT
Start: 2023-12-01 | End: 2023-12-01 | Stop reason: HOSPADM

## 2023-12-01 RX ORDER — BUPROPION HYDROCHLORIDE 150 MG/1
300 TABLET ORAL DAILY
Status: DISCONTINUED | OUTPATIENT
Start: 2023-12-01 | End: 2023-12-01 | Stop reason: HOSPADM

## 2023-12-01 RX ADMIN — MELATONIN TAB 3 MG 3 MG: 3 TAB at 21:50

## 2023-12-01 NOTE — EMTALA/ACUTE CARE TRANSFER
Critical access hospital EMERGENCY DEPARTMENT  [address] [place] [address]  Dept: [phone]      EMTALA TRANSFER CONSENT    NAME Jermaine Merino                                         1970                              MRN [id number]    I have been informed of my rights regarding examination, treatment, and transfer   by Dr. Ozzy Salas DO    Benefits: Specialized equipment and/or services available at the receiving facility (Include comment)________________________    Risks: Potential for delay in receiving treatment, Potential deterioration of medical condition      Consent for Transfer:  I acknowledge that my medical condition has been evaluated and explained to me by the emergency department physician or other qualified medical person and/or my attending physician, who has recommended that I be transferred to the service of  Accepting Physician: Ana Simpson at [address] [place] [address] [address] Name, [facility] : [address]. The above potential benefits of such transfer, the potential risks associated with such transfer, and the probable risks of not being transferred have been explained to me, and I fully understand them. The doctor has explained that, in my case, the benefits of transfer outweigh the risks. I agree to be transferred. I authorize the performance of emergency medical procedures and treatments upon me in both transit and upon arrival at the receiving facility. Additionally, I authorize the release of any and all medical records to the receiving facility and request they be transported with me, if possible. I understand that the safest mode of transportation during a medical emergency is an ambulance and that the Hospital advocates the use of this mode of transport.  Risks of traveling to the receiving facility by car, including absence of medical control, life sustaining equipment, such as oxygen, and medical personnel has been explained to me and I fully understand them.    ([address])  [  ]  I consent to the stated transfer and to be transported by ambulance/helicopter. [  ]  I consent to the stated transfer, but refuse transportation by ambulance and accept full responsibility for my transportation by car. I understand the risks of non-ambulance transfers and I exonerate the Hospital and its staff from any deterioration in my condition that results from this refusal.    X___________________________________________    DATE  23  TIME________  Signature of patient or legally responsible individual signing on patient behalf           RELATIONSHIP TO PATIENT_________________________          Provider Certification    NAME Nory Crenshaw                                         1970                              MRN [id number]    A medical screening exam was performed on the above named patient. Based on the examination:    Condition Necessitating Transfer The primary encounter diagnosis was Intentional drug overdose, initial encounter ([address]). A diagnosis of Medical clearance for psychiatric admission was also pertinent to this visit.     Patient Condition: The patient has been stabilized such that within reasonable medical probability, no material deterioration of the patient condition or the condition of the unborn child(jessica) is likely to result from the transfer    Reason for Transfer: Level of Care needed not available at this facility, Patient/Family request, No bed available at level of patient's needs    Transfer Requirements: [address]   Space available and qualified personnel available for treatment as acknowledged by    Agreed to accept transfer and to provide appropriate medical treatment as acknowledged by       Simona Barrera  Appropriate medical records of the examination and treatment of the patient are provided at the time of transfer   [address] _______  Transfer will be performed by qualified personnel from and appropriate transfer equipment as required, including the use of necessary and appropriate life support measures. Provider Certification: I have examined the patient and explained the following risks and benefits of being transferred/refusing transfer to the patient/family:  General risk, such as traffic hazards, adverse weather conditions, rough terrain or turbulence, possible failure of equipment (including vehicle or aircraft), or consequences of actions of persons outside the control of the transport personnel, Unanticipated needs of medical equipment and personnel during transport, Risk of worsening condition, The possibility of a transport vehicle being unavailable      Based on these reasonable risks and benefits to the patient and/or the unborn child(jessica), and based upon the information available at the time of the patient’s examination, I certify that the medical benefits reasonably to be expected from the provision of appropriate medical treatments at another medical facility outweigh the increasing risks, if any, to the individual’s medical condition, and in the case of labor to the unborn child, from effecting the transfer.     X____________________________________________ DATE 12/01/23        TIME_______      ORIGINAL - SEND TO MEDICAL RECORDS   COPY - SEND WITH PATIENT DURING TRANSFER

## 2023-12-01 NOTE — ED NOTES
Patient is a 71-year-old female with longstanding mental illness to include schizoaffective disorder who arrived by EMS for intentional overdose. Patient has had multiple suicide attempts and subsequent inpatient [facility] admissions. Last [facility] admission was at [address] [place] [address] from 11/10-11/17/23. Patient is connected with [facility]. Patient now is medically cleared. On assessment patient was fully oriented, blunted, logical, soft spoken, calm and cooperative. Patient states that [facility] "messed my meds up". Patient  reports chronic pain and feels like she has no purpose . Yesterday she the "got bad news" that her MRI has been postponed for another 6 months which exacerbated her hopelessness. Today at approximately 0530 she ingested 21 trazodone pills as an attempt to end her life. Patient states "All I could find in the bottle. .I called my friend for Vicodin but couldn't get them."  Patient states that she called her [facility] worker following the overdose and they called 911. Regarding current thoughts of hurting herself, she nods her heads yes. Patient endorses feelings of hopelessness and anhedonia. States all she does is lay around on day in her chair and sleep. 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 Patient states she has not cut her herself/self harmed since being prescribed coumadin last September. Patient denies HI/AVH. Patient states that her appetite and sleep fluctuates. Reports she sleeps in a recliner due to chronic pain. States she is 2 months sober of all drugs/ETOH though is prescribed medical marijuana. Patient is connected with [facility]. No longer sees a therapist at [facility] because" I was double dipping with insurance". Lives alone with Cat. Called her father to take care of her cat. Denies access to firearms or legal issues. Patient signed 12. Rights and 72-hour notice were explained and understood.  Patient is requesting admission at [facility] because "I need a hospital type bed to sleep". Patient gave verbal consent for [facility] to be updated. Faxed 302 to Intake. Completed Crisis, safety risk assessments.

## 2023-12-01 NOTE — ED NOTES
Patient is accepted at [facility]  Patient is accepted by Dr. Yobani Olmos per Jennyfer Vegas will  at 2015        Nurse report is to be called to [phone] prior to patient transfer.

## 2023-12-01 NOTE — PROGRESS NOTES
Outpatient Care Management Note; In basket ER alert received. Chart review completed. Plan is for admission to [facility] after medically stable due to suicide attempt. Call attempted to Jameson Dixie to let her know we are aware of admission and available. Voice mail left with information. Call placed to [facility] to make them aware of admission    Transitions:   Type: Unplanned  Provider notification within 2 days of discharge  PCP: Jethro Adams  Notified via: ADT alert  Specialty Provider: [facility]  Notified via: Telephonic  Other Care Team Member: RAIN HERNANDEZ  Notified via: ADT alert  Collaboration with discharge team: reviewed HP IP/OP [facility]  Communication of changes to care plans to patient/caregiver:  Will review after discharge

## 2023-12-01 NOTE — ED CARE HANDOFF
Emergency Department Sign Out Note        Sign out and transfer of care from Dr Lucy Salguero. See Separate Emergency Department note. The patient, Brittany Smoker, was evaluated by the previous provider for suicide attempt. Took 21 pills of 100 mg trazodone. Pt reports nausea and fatigue, mild dyspnea. Poison control contacted. 6 hour observation from ingestion recommended. Hx of obesity , MAG, COPD, PE on warfarin,  HTN, hypothyroidism, Sjogren's syndrome, overactive bladder, GERD, chronic LBP, DDD, migraine, and PPH of Schizoaffective disorder, Borderline Personality Disorder, polysubstance abuse, HTN, HLD, drug overdose. Workup Completed:  Pending labs. ECG completed. Sinus rhythm    Per poison control watch for seizure, bradycardia, hypotension, lethargy. 6 hours obs from time of ingestion (5 hours)      Labs Reviewed   RAPID DRUG SCREEN, URINE - Abnormal       Result Value Ref Range Status    Amph/Meth UR Negative  Negative Final    Barbiturate Ur Negative  Negative Final    Benzodiazepine Urine Negative  Negative Final    Cocaine Urine Negative  Negative Final    Methadone Urine Negative  Negative Final    Opiate Urine Negative  Negative Final    PCP Ur Negative  Negative Final    THC Urine Positive (*) Negative Final    Oxycodone Urine Negative  Negative Final    Narrative:     Presumptive report. If requested, specimen will be sent to reference lab for confirmation. FOR MEDICAL PURPOSES ONLY. IF CONFIRMATION NEEDED PLEASE CONTACT THE LAB WITHIN 5 DAYS.     Drug Screen Cutoff Levels:  AMPHETAMINE/METHAMPHETAMINES  1000 ng/mL  BARBITURATES     200 ng/mL  BENZODIAZEPINES     200 ng/mL  COCAINE      300 ng/mL  METHADONE      300 ng/mL  OPIATES      300 ng/mL  PHENCYCLIDINE     25 ng/mL  THC       50 ng/mL  OXYCODONE      100 ng/mL   ACETAMINOPHEN LEVEL - Abnormal    Acetaminophen Level <2 (*) 10 - 20 ug/mL Final   PROTIME-INR - Abnormal    Protime 22.1 (*) 11.6 - 14.5 seconds Final    INR 1.98 (*) 0.84 - 1.19 Final   APTT - Abnormal    PTT 40 (*) 23 - 37 seconds Final    Comment: Therapeutic Heparin Range =  60-90 seconds   MAGNESIUM - Normal    Magnesium 2.1  1.9 - 2.7 mg/dL Final   HS TROPONIN I 0HR - Normal    hs TnI 0hr 4  "Refer to ACS Flowchart"- see link ng/L Final    Comment:                                              Initial (time 0) result  If >=50 ng/L, Myocardial injury suggested ;  Type of myocardial injury and treatment strategy  to be determined. If 5-49 ng/L, a delta result at 2 hours and or 4 hours will be needed to further evaluate. If <4 ng/L, and chest pain has been >3 hours since onset, patient may qualify for discharge based on the HEART score in the ED. If <5 ng/L and <3hours since onset of chest pain, a delta result at 2 hours will be needed to further evaluate. HS Troponin 99th Percentile URL of a Health Population=12 ng/L with a 95% Confidence Interval of 8-18 ng/L. Second Troponin (time 2 hours)  If calculated delta >= 20 ng/L,  Myocardial injury suggested ; Type of myocardial injury and treatment strategy to be determined. If 5-49 ng/L and the calculated delta is 5-19 ng/L, consult medical service for evaluation. Continue evaluation for ischemia on ecg and other possible etiology and repeat hs troponin at 4 hours. If delta is <5 ng/L at 2 hours, consider discharge based on risk stratification via the HEART score (if in ED), or DAVID risk score in IP/Observation. HS Troponin 99th Percentile URL of a Health Population=12 ng/L with a 95% Confidence Interval of 8-18 ng/L.    MEDICAL ALCOHOL - Normal    Ethanol Lvl <10  <10 mg/dL Final   SALICYLATE LEVEL - Normal    Salicylate Lvl <5  3 - 20 mg/dL Final   POCT GLUCOSE - Normal    POC Glucose 88  65 - 140 mg/dl Final   CBC AND DIFFERENTIAL    WBC 9.47  4.31 - 10.16 Thousand/uL Final    RBC 4.75  3.81 - 5.12 Million/uL Final    Hemoglobin 14.2  11.5 - 15.4 g/dL Final    Hematocrit 43.4  34.8 - 46.1 % Final    MCV 91  82 - 98 fL Final    MCH 29.9  26.8 - 34.3 pg Final    MCHC 32.7  31.4 - 37.4 g/dL Final    RDW 14.2  11.6 - 15.1 % Final    MPV 10.2  8.9 - 12.7 fL Final    Platelets 908  473 - 390 Thousands/uL Final    nRBC 0  /100 WBCs Final    Neutrophils Relative 64  43 - 75 % Final    Immat GRANS % 2  0 - 2 % Final    Lymphocytes Relative 25  14 - 44 % Final    Monocytes Relative 8  4 - 12 % Final    Eosinophils Relative 1  0 - 6 % Final    Basophils Relative 0  0 - 1 % Final    Neutrophils Absolute 6.05  1.85 - 7.62 Thousands/µL Final    Immature Grans Absolute 0.20  0.00 - 0.20 Thousand/uL Final    Lymphocytes Absolute 2.32  0.60 - 4.47 Thousands/µL Final    Monocytes Absolute 0.79  0.17 - 1.22 Thousand/µL Final    Eosinophils Absolute 0.07  0.00 - 0.61 Thousand/µL Final    Basophils Absolute 0.04  0.00 - 0.10 Thousands/µL Final   COMPREHENSIVE METABOLIC PANEL    Sodium 286  135 - 147 mmol/L Final    Potassium 3.8  3.5 - 5.3 mmol/L Final    Chloride 105  96 - 108 mmol/L Final    CO2 23  21 - 32 mmol/L Final    ANION GAP 9  mmol/L Final    BUN 15  5 - 25 mg/dL Final    Creatinine 0.64  0.60 - 1.30 mg/dL Final    Comment: Standardized to IDMS reference method    Glucose 93  65 - 140 mg/dL Final    Comment: If the patient is fasting, the ADA then defines impaired fasting glucose as > 100 mg/dL and diabetes as > or equal to 123 mg/dL. Calcium 9.3  8.4 - 10.2 mg/dL Final    AST 13  13 - 39 U/L Final    ALT 15  7 - 52 U/L Final    Comment: Specimen collection should occur prior to Sulfasalazine administration due to the potential for falsely depressed results. Alkaline Phosphatase 62  34 - 104 U/L Final    Total Protein 7.0  6.4 - 8.4 g/dL Final    Albumin 3.8  3.5 - 5.0 g/dL Final    Total Bilirubin 0.25  0.20 - 1.00 mg/dL Final    Comment: Use of this assay is not recommended for patients undergoing treatment with eltrombopag due to the potential for falsely elevated results.   N-acetyl-p-benzoquinone imine (metabolite of Acetaminophen) will generate erroneously low results in samples for patients that have taken an overdose of Acetaminophen. eGFR 102  ml/min/1.73sq m Final    Narrative:     [place] guidelines for Chronic Kidney Disease (CKD):     Stage 1 with normal or high GFR (GFR > 90 mL/min/1.73 square meters)    Stage 2 Mild CKD (GFR = 60-89 mL/min/1.73 square meters)    Stage 3A Moderate CKD (GFR = 45-59 mL/min/1.73 square meters)    Stage 3B Moderate CKD (GFR = 30-44 mL/min/1.73 square meters)    Stage 4 Severe CKD (GFR = 15-29 mL/min/1.73 square meters)    Stage 5 End Stage CKD (GFR <15 mL/min/1.73 square meters)  Note: GFR calculation is accurate only with a steady state creatinine   COMA PANEL    Narrative: The following orders were created for panel order Coma panel. Procedure                               Abnormality         Status                     ---------                               -----------         ------                     DUIUIEC[[id number]]                      Normal              Final result               Salicylate CJWPM[[id number]]             Normal              Final result               Acetaminophen level-If c. Ayden Negus Ayden Negus [[id number]]  Abnormal            Final result                 Please view results for these tests on the individual orders. ED Course / Workup Pending (followup): Patient was evaluated medically. Did not become symptomatic. Labs without significant findings. INR just slightly subtherapeutic but recently started on higher dosage of warfarin. Patient is medically cleared for psychiatric hospitalization. Signed out at the end of my shift pending transportation to [facility] after being accepted. ED Course as of 12/01/23 1613   Fri Dec 01, 2023   1092 Patient resting comfortably. Wakes easily when I walk into the room. 1128 Patient reports minimal complaints.   Reports mild nausea that she states is related to her being hungry. Patient is 6 hours postingestion. Patient is medically cleared for psychiatric hospitalization. Procedures  Medical Decision Making  Problems Addressed:  Intentional drug overdose, initial encounter [facility]): acute illness or injury    Amount and/or Complexity of Data Reviewed  Labs: ordered. Radiology: ordered and independent interpretation performed. Risk  OTC drugs. Prescription drug management. Decision regarding hospitalization. Physical Exam  Vitals and nursing note reviewed. Constitutional:       General: She is not in acute distress. Appearance: Normal appearance. She is obese. She is not ill-appearing. HENT:      Head: Normocephalic and atraumatic. Right Ear: External ear normal.      Left Ear: External ear normal.      Nose: Nose normal.      Mouth/Throat:      Mouth: Mucous membranes are moist.   Eyes:      General:         Right eye: No discharge. Left eye: No discharge. Conjunctiva/sclera: Conjunctivae normal.   Cardiovascular:      Rate and Rhythm: Normal rate and regular rhythm. Pulses: Normal pulses. Heart sounds: No murmur heard. Pulmonary:      Effort: Pulmonary effort is normal.      Breath sounds: Normal breath sounds. Abdominal:      General: Abdomen is flat. There is no distension. Tenderness: There is no abdominal tenderness. There is no guarding or rebound. Comments: She notes feeling nauseous when I palpate abdomen but no focal tenderness. Musculoskeletal:         General: Normal range of motion. Cervical back: Normal range of motion. Skin:     General: Skin is warm. Capillary Refill: Capillary refill takes less than 2 seconds. Findings: No rash. Neurological:      General: No focal deficit present. Mental Status: She is alert. Mental status is at baseline.    Psychiatric:         Mood and Affect: Mood normal.         Behavior: Behavior normal.               Disposition  Final diagnoses:   Intentional drug overdose, initial encounter [facility])     Time reflects when diagnosis was documented in both MDM as applicable and the Disposition within this note       Time User Action Codes Description Comment    12/1/2023  7:05 AM Ciro Murdock Add [T50.902A] Intentional drug overdose, initial encounter ([address])     12/1/2023  3:20 PM Orly Villagomez Add [Z00.8] Medical clearance for psychiatric admission           ED Disposition       ED Disposition   Transfer to [address] [place] [address]   --    Date/Time   Fri Dec 1, 2023 11:30 AM    Comment   Linda Carranza should be transferred out to behavioral health and has been medically cleared.                 MD Documentation      Refugio Woods Most Recent Value   Patient Condition The patient has been stabilized such that within reasonable medical probability, no material deterioration of the patient condition or the condition of the unborn child(jessica) is likely to result from the transfer   Reason for Transfer Level of Care needed not available at this facility, Patient/Family request, No bed available at level of patient's needs   Benefits of Transfer Specialized equipment and/or services available at the receiving facility (Include comment)________________________   Risks of Transfer Potential for delay in receiving treatment, Potential deterioration of medical condition   Accepting Physician [address] Name, [facility]B   Sending MD Dr. Estrella Hamilton   Provider Certification General risk, such as traffic hazards, adverse weather conditions, rough terrain or turbulence, possible failure of equipment (including vehicle or aircraft), or consequences of actions of persons outside the control of the transport personnel, Unanticipated needs of medical equipment and personnel during transport, Risk of worsening condition, The possibility of a transport vehicle being unavailable          RN Documentation      Flowsheet Row Most Recent Value   Accepting Facility Name, [facility]          Follow-up Information    None       Patient's Medications   Discharge Prescriptions    No medications on file     No discharge procedures on file.        ED Provider  Electronically Signed by     Silvia Corey DO  12/01/23 9849

## 2023-12-01 NOTE — ED PROVIDER NOTES
History  Chief Complaint   Patient presents with    Overdose - Intentional     Pt presents to ED via EMS after pt ingested twenty one 100mg trazodone tabs trying to kill herself. Pt had attempted suicide multiple times in the past.     60-year-old female with history of bipolar disorder, schizoaffective disorder, COPD, hypertension, hyperlipidemia, PE on Coumadin who presents for evaluation after intentional overdose. Patient reports feeling suicidal since her discharge 2 weeks ago after "they messed up all my meds."  This morning approximately 1.5 hours prior to arrival, patient reports taking 21 100 mg trazodone tablets in a suicide attempt. She complains of mild nausea and feeling tired. She also states she feels slightly short of breath. She denies chest pain, abdominal pain, vomiting, fever. She denies ingestion of any other substances. Prior to Admission Medications   Prescriptions Last Dose Informant Patient Reported? Taking? OXcarbazepine (TRILEPTAL) 300 mg tablet   No No   Sig: Take 1 tablet (300 mg total) by mouth every 12 (twelve) hours   atoMOXetine (STRATTERA) 40 mg capsule   No No   Sig: Take 1 capsule (40 mg total) by mouth daily   buPROPion (WELLBUTRIN XL) 300 mg 24 hr tablet   No No   Sig: Take 1 tablet (300 mg total) by mouth daily   cholecalciferol (VITAMIN D3) 1,000 units tablet   No No   Sig: Take 1 tablet (1,000 Units total) by mouth daily   cloNIDine (CATAPRES) 0.1 mg tablet   No No   Sig: Take 1 tablet (0.1 mg total) by mouth every 12 (twelve) hours   furosemide (LASIX) 20 mg tablet   No No   Sig: Take 1 tablet (20 mg total) by mouth daily Do not start before 2023.   haloperidol decanoate (Haldol Decanoate) 50 mg/mL injection   No No   Si mL (50 mg total) by Intramuscular - haloperidol decanoate route every 28 days Do not start before 2023.    levothyroxine (Euthyrox) 125 mcg tablet   No No   Sig: Take 1 tablet (125 mcg total) by mouth daily in the early morning   losartan (COZAAR) 100 MG tablet   No No   Sig: Take 1 tablet (100 mg total) by mouth daily   lurasidone 60 MG TABS   No No   Sig: Take 1 tablet (60 mg total) by mouth daily with breakfast Do not start before 2023.    melatonin 3 mg   No No   Sig: Take 1 tablet (3 mg total) by mouth daily at bedtime   naltrexone (REVIA) 50 mg tablet   No No   Sig: Take 1 tablet (50 mg total) by mouth daily   rOPINIRole (REQUIP) 1 mg tablet  Self Yes No   Si mg   topiramate (TOPAMAX) 200 MG tablet   No No   Sig: Take 1 tablet (200 mg total) by mouth 2 (two) times a day   trospium chloride (SANCTURA) 20 mg tablet  Self Yes No   Sig: Take 20 mg by mouth 2 (two) times a day   warfarin (COUMADIN) 5 mg tablet   No No   Sig: Take 1 tablet (5 mg total) by mouth daily      Facility-Administered Medications: None       Past Medical History:   Diagnosis Date    Anxiety     Arthritis     oa cassandra knees    Asthma     good control- no medications    Yan's esophagus     Bipolar affective disorder (HCC)     Cannabis abuse with unspecified 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HCC)     Chronic pain disorder     lumbar    COPD (chronic obstructive pulmonary disease) (HCC)     CPAP (continuous positive airway pressure) dependence     DDD (degenerative disc disease), lumbar 2015    Degenerative disc disease at L5-S1 level     Deliberate self-cutting     9/15/22per pt has not done recently-- does see a therapist and psychiatrist regularly    Depression 2008    Disease of thyroid gland     hypo    MARTINEZ (dyspnea on exertion)     per pt "has had this with exertion and not new"    Drug overdose 10/28/2008    suicide attempt and again drug overdose 2022-- [facility] floor and than transferred to [facility]    Dysphagia     Dyspnea     Edema     BLE    Family history of blood clots     GERD (gastroesophageal reflux disease)     Headache(784.0)     Headache, tension-type     Hemorrhage of rectum and anus 10/02/2017 Formatting of this note might be different from the original.  Added automatically from request for surgery [id number]    High blood pressure     History of COVID-19 12/30/2020    Symptoms started on 12/30/2020 and then got worse. Today she is feeling a little bit better. She is a candidate for monoclonal antibody infusion and set for 1/6/21 @ 1pm at [facility]. 01/07/21 - telemedicine -     Hyperlipidemia     Hypertension     Knee pain, bilateral     Especially right    Medial epicondylitis of elbow, left 04/03/2018    Formatting of this note might be different from the original.  Added automatically from request for surgery [id number]    Medial epicondylitis of right elbow 07/17/2023    Medical marijuana use     Memory loss     Migraines     Obesity     Overactive bladder     Primary osteoarthritis of both knees 08/08/2018    Pulmonary emboli (HCC)     Restrictive lung disease 02/01/2017    Last Assessment & Plan:   Formatting of this note might be different from the original.  I reviewed this problem throughout the rest of the note. Please refer to the other assessments for details. Sjogren's disease ([address])     Sleep apnea     Stress incontinence     Suicidal ideations     Teeth missing     Tremor     per pt Tremors of Right Leg and both Arms    Visual impairment     Wears glasses        Past Surgical History:   Procedure Laterality Date    BREAST CYST EXCISION Right 1989    CARPAL TUNNEL RELEASE Left     CHOLECYSTECTOMY  05/2003    Laparoscopic    COLONOSCOPY      01/12/2009    DILATION AND CURETTAGE OF UTERUS      ELBOW SURGERY Left     x2.  No hardware    ESOPHAGOGASTRODUODENOSCOPY      FOOT SURGERY Left     Plantar fasciotomy    HERNIA REPAIR      HYSTERECTOMY      laporoscopic, partial    KNEE ARTHROSCOPY Bilateral     OOPHORECTOMY Left 10/2015    ME GASTROCNEMIUS RECESSION Left 02/24/2021    Procedure: RECESSION GASTROC OPEN;  Surgeon: Katy Geronimo DPM;  Location: [address] [place] [address] MAIN OR;  Service: Podiatry    ME RPR UMBILICAL HRNA 5 YRS/> REDUCIBLE N/A 04/24/2019    Procedure: REPAIR HERNIA UMBILICAL LAPAROSCOPIC W/ ROBOTICS;  Surgeon: Dayana Cartagena MD;  Location: [facility] Main OR;  Service: General    MS SPHINCTEROTOMY ANAL DIVISION SPHINCTER 44 [facility] N/A 08/31/2018    Procedure: EUA, LEFT PARTIAL INTERNAL SPHINCTEROTOMY;  Surgeon: Ashlie Leon MD;  Location: [address] [place] [address] MAIN OR;  Service: Colorectal    MS TNOT ELBOW LATERAL/MEDIAL DEBRIDE OPEN TDN RPR Left 09/20/2022    Procedure: RELEASE EPICONDYLAR ELBOW MEDIAL;  Surgeon: Emani Gonzalez MD;  Location: [facility] MAIN OR;  Service: Orthopedics    REDUCTION MAMMAPLASTY Bilateral 1999    REPAIR LACERATION Left     left hand-5/18/2009    REPLACEMENT TOTAL KNEE Right     ROTATOR CUFF REPAIR Left     TONSILECTOMY AND ADNOIDECTOMY      US GUIDED MSK PROCEDURE  04/22/2021    VASCULAR SURGERY      VEIN LIGATION Bilateral     WISDOM TOOTH EXTRACTION         Family History   Problem Relation Age of Onset    Kidney cancer Mother     Diabetes Mother     Depression Mother     Stroke Mother     Arthritis Mother     Cancer Mother     Psychiatric Illness Mother     No Known Problems Father     No Known Problems Sister     No Known Problems Maternal Grandmother     No Known Problems Maternal Grandfather     No Known Problems Paternal Grandmother     No Known Problems Paternal Grandfather     Colon cancer Maternal Uncle     Colon cancer Maternal Uncle     Colon cancer Paternal Uncle     Colon cancer Family     Alcohol abuse Sister     Asthma Sister      I have reviewed and agree with the history as documented.     E-Cigarette/Vaping    E-Cigarette Use Never User     Comments medical THC      E-Cigarette/Vaping Substances    Nicotine No     THC Yes     CBD No     Flavoring No     Other No     Unknown No      Social History     Tobacco Use    Smoking status: Former     Packs/day: 2.00     Years: 30.00     Total pack years: 60.00     Types: Cigarettes     Start date: 1/1/1985     Quit date: 1/2/2018     Years since quittin.9    Smokeless tobacco: Never    Tobacco comments:     Started at age 14. Vaping Use    Vaping Use: Never used   Substance Use Topics    Alcohol use: Not Currently     Comment: Recovering alcoholic    Drug use: Yes     Types: Marijuana, Methamphetamines     Comment: 2 months off meth       Review of Systems   Constitutional:  Negative for chills and fever. Respiratory:  Positive for shortness of breath. Negative for cough. Cardiovascular:  Negative for chest pain. Gastrointestinal:  Positive for nausea. Negative for abdominal pain and vomiting. Genitourinary:  Negative for dysuria and flank pain. Musculoskeletal:  Negative for gait problem. Skin:  Negative for rash. Neurological:  Negative for weakness and light-headedness. All other systems reviewed and are negative. Physical Exam  Physical Exam  Vitals and nursing note reviewed. Constitutional:       General: She is not in acute distress. Appearance: She is well-developed. She is not ill-appearing. HENT:      Head: Normocephalic and atraumatic. Nose: Nose normal.      Mouth/Throat:      Mouth: Mucous membranes are moist.   Eyes:      Extraocular Movements: Extraocular movements intact. Conjunctiva/sclera: Conjunctivae normal.      Pupils: Pupils are equal, round, and reactive to light. Cardiovascular:      Rate and Rhythm: Normal rate and regular rhythm. Heart sounds: No murmur heard. No friction rub. No gallop. Pulmonary:      Effort: Pulmonary effort is normal.      Breath sounds: Normal breath sounds. No wheezing, rhonchi or rales. Abdominal:      General: There is no distension. Palpations: Abdomen is soft. Tenderness: There is no abdominal tenderness. Musculoskeletal:         General: No swelling or tenderness. Normal range of motion. Cervical back: Normal range of motion and neck supple. Skin:     General: Skin is warm and dry. Coloration: Skin is not pale. Findings: No rash. Neurological:      General: No focal deficit present. Mental Status: She is alert and oriented to person, place, and time. Cranial Nerves: No cranial nerve deficit. Sensory: No sensory deficit. Motor: No weakness. Psychiatric:         Behavior: Behavior normal.         Vital Signs  ED Triage Vitals   Temperature Pulse Respirations Blood Pressure SpO2   12/01/23 0647 12/01/23 0642 12/01/23 0642 12/01/23 0642 12/01/23 0642   97.9 °F (36.6 °C) 74 18 153/72 94 %      Temp Source Heart Rate Source Patient Position - Orthostatic VS BP Location FiO2 (%)   12/01/23 0647 -- -- -- --   Oral          Pain Score       --                  Vitals:    12/01/23 0642   BP: 153/72   Pulse: 74         Visual Acuity      ED Medications  Medications - No data to display    Diagnostic Studies  Results Reviewed       Procedure Component Value Units Date/Time    Protime-INR [[id number]] Collected: 12/01/23 0703    Lab Status: In process Specimen: Blood from Arm, Right Updated: 12/01/23 0705    APTT [[id number]] Collected: 12/01/23 0703    Lab Status:  In process Specimen: Blood from Arm, Right Updated: 12/01/23 0705    CBC and differential [[id number]] Collected: 12/01/23 0656    Lab Status: Final result Specimen: Blood from Arm, Right Updated: 12/01/23 0704     WBC 9.47 Thousand/uL      RBC 4.75 Million/uL      Hemoglobin 14.2 g/dL      Hematocrit 43.4 %      MCV 91 fL      MCH 29.9 pg      MCHC 32.7 g/dL      RDW 14.2 %      MPV 10.2 fL      Platelets 132 Thousands/uL      nRBC 0 /100 WBCs      Neutrophils Relative 64 %      Immat GRANS % 2 %      Lymphocytes Relative 25 %      Monocytes Relative 8 %      Eosinophils Relative 1 %      Basophils Relative 0 %      Neutrophils Absolute 6.05 Thousands/µL      Immature Grans Absolute 0.20 Thousand/uL      Lymphocytes Absolute 2.32 Thousands/µL      Monocytes Absolute 0.79 Thousand/µL      Eosinophils Absolute 0.07 Thousand/µL      Basophils Absolute 0.04 Thousands/µL     Ethanol [[id number]] Collected: 12/01/23 0656    Lab Status: In process Specimen: Blood from Arm, Right Updated: 12/01/23 0701    Comprehensive metabolic panel [[id number]] Collected: 12/01/23 0656    Lab Status: In process Specimen: Blood from Arm, Right Updated: 12/01/23 0701    Magnesium [[id number]] Collected: 12/01/23 0656    Lab Status: In process Specimen: Blood from Arm, Right Updated: 12/01/23 0701    HS Troponin 0hr (reflex protocol) [[id number]] Collected: 12/01/23 0656    Lab Status: In process Specimen: Blood from Arm, Right Updated: 08/15/32 0696    Salicylate level [[id number]] Collected: 12/01/23 0656    Lab Status: In process Specimen: Blood from Arm, Right Updated: 12/01/23 0701    Acetaminophen level-If concentration is detectable, please discuss with medical  on call. [[id number]] Collected: 12/01/23 0656    Lab Status: In process Specimen: Blood from Arm, Right Updated: 12/01/23 0701    Rapid drug screen, urine [[id number]]     Lab Status: No result Specimen: Urine                    XR chest 1 view portable    (Results Pending)              Procedures  Procedures         ED Course  ED Course as of 12/01/23 0706   Fri Dec 01, 2023   0655 Discussed with Karol from poison control. Watch for seizures, bradycardia, hypotension, lethargy. 6 hour observation. 3028 Procedure Note: EKG  Date/Time: 12/01/23 6:49 AM   Interpreted by: Chan Schneider  Indications / Diagnosis: overdose  ECG reviewed by me, the ED Provider: yes   The EKG demonstrates:  Rhythm: rate 73, normal sinus  Intervals: normal intervals  Axis: normal axis  QRS/Blocks: normal QRS  ST Changes: No acute ST Changes, no STD/BHARGAV. Medical Decision Making  58-year-old female presenting for evaluation after intentional overdose of trazodone. Patient complaining of nausea, mild shortness of breath, lethargy.   Vital signs stable on arrival.  Will evaluate for coingestions with coma panel and UDS. Will also evaluate for ACS, arrhythmia, pneumonia, pneumothorax given patient's shortness of breath. Discussed with poison control. Plan for psychiatric admission once medically cleared. Signed out to Dr. Nj Herrera pending labs and observation. Problems Addressed:  Intentional drug overdose, initial encounter [facility]): acute illness or injury    Amount and/or Complexity of Data Reviewed  Labs: ordered. Radiology: ordered. ECG/medicine tests: ordered and independent interpretation performed. Decision-making details documented in ED Course. Disposition  Final diagnoses:   Intentional drug overdose, initial encounter [facility])     Time reflects when diagnosis was documented in both MDM as applicable and the Disposition within this note       Time User Action Codes Description Comment    12/1/2023  7:05 AM Idalmis Fall Add [T50.902A] Intentional drug overdose, initial encounter [facility])           ED Disposition       None          Follow-up Information    None         Patient's Medications   Discharge Prescriptions    No medications on file       No discharge procedures on file.     PDMP Review         Value Time User    PDMP Reviewed  Yes 11/10/2023  2:54 PM Rio Kee, [address] [place] [address]            ED Provider  Electronically Signed by             Temo Douglas MD  12/01/23 1995

## 2023-12-02 PROBLEM — L60.0 INGROWN TOENAIL: Status: ACTIVE | Noted: 2023-12-02

## 2023-12-02 LAB
25(OH)D3 SERPL-MCNC: 19.3 NG/ML (ref 30–100)
ALBUMIN SERPL BCP-MCNC: 3.5 G/DL (ref 3.5–5)
ALP SERPL-CCNC: 55 U/L (ref 34–104)
ALT SERPL W P-5'-P-CCNC: 13 U/L (ref 7–52)
ANION GAP SERPL CALCULATED.3IONS-SCNC: 11 MMOL/L
AST SERPL W P-5'-P-CCNC: 13 U/L (ref 13–39)
ATRIAL RATE: 77 BPM
BASOPHILS # BLD AUTO: 0.04 THOUSANDS/ÂΜL (ref 0–0.1)
BASOPHILS NFR BLD AUTO: 0 % (ref 0–1)
BILIRUB SERPL-MCNC: 0.25 MG/DL (ref 0.2–1)
BUN SERPL-MCNC: 13 MG/DL (ref 5–25)
CALCIUM SERPL-MCNC: 8.7 MG/DL (ref 8.4–10.2)
CHLORIDE SERPL-SCNC: 107 MMOL/L (ref 96–108)
CO2 SERPL-SCNC: 21 MMOL/L (ref 21–32)
CREAT SERPL-MCNC: 0.62 MG/DL (ref 0.6–1.3)
EOSINOPHIL # BLD AUTO: 0.06 THOUSAND/ÂΜL (ref 0–0.61)
EOSINOPHIL NFR BLD AUTO: 1 % (ref 0–6)
ERYTHROCYTE [DISTWIDTH] IN BLOOD BY AUTOMATED COUNT: 14.2 % (ref 11.6–15.1)
FOLATE SERPL-MCNC: 21 NG/ML
GFR SERPL CREATININE-BSD FRML MDRD: 104 ML/MIN/1.73SQ M
GLUCOSE P FAST SERPL-MCNC: 107 MG/DL (ref 65–99)
GLUCOSE SERPL-MCNC: 107 MG/DL (ref 65–140)
HCT VFR BLD AUTO: 40.5 % (ref 34.8–46.1)
HGB BLD-MCNC: 12.9 G/DL (ref 11.5–15.4)
IMM GRANULOCYTES # BLD AUTO: 0.1 THOUSAND/UL (ref 0–0.2)
IMM GRANULOCYTES NFR BLD AUTO: 1 % (ref 0–2)
LYMPHOCYTES # BLD AUTO: 2.24 THOUSANDS/ÂΜL (ref 0.6–4.47)
LYMPHOCYTES NFR BLD AUTO: 25 % (ref 14–44)
MAGNESIUM SERPL-MCNC: 2.3 MG/DL (ref 1.9–2.7)
MCH RBC QN AUTO: 29.7 PG (ref 26.8–34.3)
MCHC RBC AUTO-ENTMCNC: 31.9 G/DL (ref 31.4–37.4)
MCV RBC AUTO: 93 FL (ref 82–98)
MONOCYTES # BLD AUTO: 0.75 THOUSAND/ÂΜL (ref 0.17–1.22)
MONOCYTES NFR BLD AUTO: 8 % (ref 4–12)
NEUTROPHILS # BLD AUTO: 5.77 THOUSANDS/ÂΜL (ref 1.85–7.62)
NEUTS SEG NFR BLD AUTO: 65 % (ref 43–75)
NRBC BLD AUTO-RTO: 0 /100 WBCS
P AXIS: 47 DEGREES
PHOSPHATE SERPL-MCNC: 3.4 MG/DL (ref 2.7–4.5)
PLATELET # BLD AUTO: 164 THOUSANDS/UL (ref 149–390)
PMV BLD AUTO: 10.9 FL (ref 8.9–12.7)
POTASSIUM SERPL-SCNC: 3.8 MMOL/L (ref 3.5–5.3)
PR INTERVAL: 180 MS
PROT SERPL-MCNC: 6 G/DL (ref 6.4–8.4)
QRS AXIS: -12 DEGREES
QRSD INTERVAL: 90 MS
QT INTERVAL: 388 MS
QTC INTERVAL: 439 MS
RBC # BLD AUTO: 4.34 MILLION/UL (ref 3.81–5.12)
SODIUM SERPL-SCNC: 139 MMOL/L (ref 135–147)
T WAVE AXIS: 18 DEGREES
TSH SERPL DL<=0.05 MIU/L-ACNC: 4.32 UIU/ML (ref 0.45–4.5)
VENTRICULAR RATE: 77 BPM
VIT B12 SERPL-MCNC: 235 PG/ML (ref 180–914)
WBC # BLD AUTO: 8.96 THOUSAND/UL (ref 4.31–10.16)

## 2023-12-02 PROCEDURE — 82607 VITAMIN B-12: CPT

## 2023-12-02 PROCEDURE — 80053 COMPREHEN METABOLIC PANEL: CPT

## 2023-12-02 PROCEDURE — 93010 ELECTROCARDIOGRAM REPORT: CPT | Performed by: STUDENT IN AN ORGANIZED HEALTH CARE EDUCATION/TRAINING PROGRAM

## 2023-12-02 PROCEDURE — 82306 VITAMIN D 25 HYDROXY: CPT

## 2023-12-02 PROCEDURE — 85025 COMPLETE CBC W/AUTO DIFF WBC: CPT

## 2023-12-02 PROCEDURE — 84443 ASSAY THYROID STIM HORMONE: CPT

## 2023-12-02 PROCEDURE — 99222 1ST HOSP IP/OBS MODERATE 55: CPT | Performed by: STUDENT IN AN ORGANIZED HEALTH CARE EDUCATION/TRAINING PROGRAM

## 2023-12-02 PROCEDURE — 83735 ASSAY OF MAGNESIUM: CPT

## 2023-12-02 PROCEDURE — 84100 ASSAY OF PHOSPHORUS: CPT

## 2023-12-02 PROCEDURE — 82746 ASSAY OF FOLIC ACID SERUM: CPT

## 2023-12-02 PROCEDURE — 99222 1ST HOSP IP/OBS MODERATE 55: CPT | Performed by: NURSE PRACTITIONER

## 2023-12-02 RX ORDER — LEVOTHYROXINE SODIUM 0.12 MG/1
125 TABLET ORAL
Status: DISCONTINUED | OUTPATIENT
Start: 2023-12-02 | End: 2024-02-01 | Stop reason: HOSPADM

## 2023-12-02 RX ORDER — NALTREXONE HYDROCHLORIDE 50 MG/1
50 TABLET, FILM COATED ORAL DAILY
Status: DISCONTINUED | OUTPATIENT
Start: 2023-12-02 | End: 2024-02-01 | Stop reason: HOSPADM

## 2023-12-02 RX ORDER — LURASIDONE HYDROCHLORIDE 80 MG/1
80 TABLET, FILM COATED ORAL
Status: DISCONTINUED | OUTPATIENT
Start: 2023-12-02 | End: 2023-12-05

## 2023-12-02 RX ORDER — CEPHALEXIN 500 MG/1
500 CAPSULE ORAL EVERY 6 HOURS SCHEDULED
Status: COMPLETED | OUTPATIENT
Start: 2023-12-02 | End: 2023-12-07

## 2023-12-02 RX ORDER — CLONIDINE HYDROCHLORIDE 0.1 MG/1
0.1 TABLET ORAL EVERY 12 HOURS SCHEDULED
Status: DISCONTINUED | OUTPATIENT
Start: 2023-12-02 | End: 2024-01-02

## 2023-12-02 RX ORDER — LANOLIN ALCOHOL/MO/W.PET/CERES
3 CREAM (GRAM) TOPICAL
Status: DISCONTINUED | OUTPATIENT
Start: 2023-12-02 | End: 2023-12-05

## 2023-12-02 RX ORDER — PANTOPRAZOLE SODIUM 40 MG/1
40 TABLET, DELAYED RELEASE ORAL
Status: DISCONTINUED | OUTPATIENT
Start: 2023-12-02 | End: 2024-02-01 | Stop reason: HOSPADM

## 2023-12-02 RX ORDER — TOPIRAMATE 100 MG/1
200 TABLET, FILM COATED ORAL 2 TIMES DAILY
Status: DISCONTINUED | OUTPATIENT
Start: 2023-12-02 | End: 2024-02-01 | Stop reason: HOSPADM

## 2023-12-02 RX ORDER — FUROSEMIDE 20 MG/1
20 TABLET ORAL DAILY
Status: DISCONTINUED | OUTPATIENT
Start: 2023-12-02 | End: 2024-02-01 | Stop reason: HOSPADM

## 2023-12-02 RX ORDER — MAGNESIUM HYDROXIDE/ALUMINUM HYDROXICE/SIMETHICONE 120; 1200; 1200 MG/30ML; MG/30ML; MG/30ML
30 SUSPENSION ORAL EVERY 4 HOURS PRN
Status: DISCONTINUED | OUTPATIENT
Start: 2023-12-02 | End: 2024-02-01 | Stop reason: HOSPADM

## 2023-12-02 RX ORDER — ATOMOXETINE 40 MG/1
40 CAPSULE ORAL DAILY
Status: DISCONTINUED | OUTPATIENT
Start: 2023-12-02 | End: 2024-02-01 | Stop reason: HOSPADM

## 2023-12-02 RX ORDER — WARFARIN SODIUM 5 MG/1
10 TABLET ORAL
Status: DISCONTINUED | OUTPATIENT
Start: 2023-12-02 | End: 2023-12-07

## 2023-12-02 RX ORDER — MELATONIN
1000 DAILY
Status: DISCONTINUED | OUTPATIENT
Start: 2023-12-02 | End: 2023-12-04

## 2023-12-02 RX ORDER — LOSARTAN POTASSIUM 50 MG/1
100 TABLET ORAL DAILY
Status: DISCONTINUED | OUTPATIENT
Start: 2023-12-02 | End: 2024-02-01 | Stop reason: HOSPADM

## 2023-12-02 RX ORDER — OXCARBAZEPINE 300 MG/1
300 TABLET, FILM COATED ORAL EVERY 12 HOURS SCHEDULED
Status: DISCONTINUED | OUTPATIENT
Start: 2023-12-02 | End: 2023-12-08

## 2023-12-02 RX ORDER — LOPERAMIDE HYDROCHLORIDE 2 MG/1
2 CAPSULE ORAL EVERY 4 HOURS PRN
Status: DISCONTINUED | OUTPATIENT
Start: 2023-12-02 | End: 2024-02-01 | Stop reason: HOSPADM

## 2023-12-02 RX ORDER — OXYBUTYNIN CHLORIDE 5 MG/1
5 TABLET ORAL 2 TIMES DAILY
Status: DISCONTINUED | OUTPATIENT
Start: 2023-12-02 | End: 2024-02-01 | Stop reason: HOSPADM

## 2023-12-02 RX ADMIN — OXCARBAZEPINE 300 MG: 300 TABLET, FILM COATED ORAL at 21:25

## 2023-12-02 RX ADMIN — BUPROPION HYDROCHLORIDE 450 MG: 300 TABLET, EXTENDED RELEASE ORAL at 09:29

## 2023-12-02 RX ADMIN — TOPIRAMATE 200 MG: 100 TABLET, FILM COATED ORAL at 17:05

## 2023-12-02 RX ADMIN — PANTOPRAZOLE SODIUM 40 MG: 40 TABLET, DELAYED RELEASE ORAL at 09:48

## 2023-12-02 RX ADMIN — TOPIRAMATE 200 MG: 100 TABLET, FILM COATED ORAL at 09:29

## 2023-12-02 RX ADMIN — LURASIDONE HYDROCHLORIDE 80 MG: 80 TABLET, FILM COATED ORAL at 09:29

## 2023-12-02 RX ADMIN — LOSARTAN POTASSIUM 100 MG: 50 TABLET, FILM COATED ORAL at 09:48

## 2023-12-02 RX ADMIN — MELATONIN TAB 3 MG 3 MG: 3 TAB at 21:25

## 2023-12-02 RX ADMIN — OXCARBAZEPINE 300 MG: 300 TABLET, FILM COATED ORAL at 09:29

## 2023-12-02 RX ADMIN — CEPHALEXIN 500 MG: 500 CAPSULE ORAL at 23:13

## 2023-12-02 RX ADMIN — CEPHALEXIN 500 MG: 500 CAPSULE ORAL at 17:05

## 2023-12-02 RX ADMIN — WARFARIN SODIUM 10 MG: 5 TABLET ORAL at 17:05

## 2023-12-02 RX ADMIN — OXYBUTYNIN CHLORIDE 5 MG: 5 TABLET ORAL at 12:46

## 2023-12-02 RX ADMIN — ATOMOXETINE 40 MG: 40 CAPSULE ORAL at 09:29

## 2023-12-02 RX ADMIN — LOPERAMIDE HYDROCHLORIDE 2 MG: 2 CAPSULE ORAL at 16:39

## 2023-12-02 RX ADMIN — OXYBUTYNIN CHLORIDE 5 MG: 5 TABLET ORAL at 17:14

## 2023-12-02 RX ADMIN — CEPHALEXIN 500 MG: 500 CAPSULE ORAL at 12:02

## 2023-12-02 RX ADMIN — FUROSEMIDE 20 MG: 20 TABLET ORAL at 09:48

## 2023-12-02 RX ADMIN — LEVOTHYROXINE SODIUM 125 MCG: 125 TABLET ORAL at 09:48

## 2023-12-02 RX ADMIN — CHOLECALCIFEROL TAB 25 MCG (1000 UNIT) 1000 UNITS: 25 TAB at 09:50

## 2023-12-02 RX ADMIN — CLONIDINE HYDROCHLORIDE 0.1 MG: 0.1 TABLET ORAL at 09:29

## 2023-12-02 RX ADMIN — CLONIDINE HYDROCHLORIDE 0.1 MG: 0.1 TABLET ORAL at 21:25

## 2023-12-02 RX ADMIN — NALTREXONE HYDROCHLORIDE 50 MG: 50 TABLET, FILM COATED ORAL at 09:29

## 2023-12-02 NOTE — ASSESSMENT & PLAN NOTE
Ingrown right great toe nail. Seen by podiatry as outpatient.  Continue Keflex 500 mg eery 6 hours to complete a 7-day course of therapy

## 2023-12-02 NOTE — PLAN OF CARE
Problem: Ineffective Coping  Goal: Cooperates with admission process  Description: Interventions:   - Complete admission process  Outcome: Not Progressing  Goal: Identifies ineffective coping skills  Outcome: Not Progressing  Goal: Identifies healthy coping skills  Outcome: Not Progressing  Goal: Demonstrates healthy coping skills  Outcome: Not Progressing  Goal: Participates in unit activities  Description: Interventions:  - Provide therapeutic environment   - Provide required programming   - Redirect inappropriate behaviors   Outcome: Not Progressing  Goal: Patient/Family participate in treatment and DC plans  Description: Interventions:  - Provide therapeutic environment  Outcome: Not Progressing  Goal: Patient/Family verbalizes awareness of resources  Outcome: Not Progressing  Goal: Understands least restrictive measures  Description: Interventions:  - Utilize least restrictive behavior  Outcome: Not Progressing  Goal: Free from restraint events  Description: - Utilize least restrictive measures   - Provide behavioral interventions   - Redirect inappropriate behaviors   Outcome: Not Progressing     Problem: Risk for Self Injury/Neglect  Goal: Treatment Goal: Remain safe during length of stay, learn and adopt new coping skills, and be free of self-injurious ideation, impulses and acts at the time of discharge  Outcome: Not Progressing  Goal: Verbalize thoughts and feelings  Description: Interventions:  - Assess and re-assess patient's lethality and potential for self-injury  - Engage patient in 1:1 interactions, daily, for a minimum of 15 minutes  - Encourage patient to express feelings, fears, frustrations, hopes  - Establish rapport/trust with patient   Outcome: Not Progressing  Goal: Refrain from harming self  Description: Interventions:  - Monitor patient closely, per order  - Develop a trusting relationship  - Supervise medication ingestion, monitor effects and side effects   Outcome: Not  Progressing  Goal: Attend and participate in unit activities, including therapeutic, recreational, and educational groups  Description: Interventions:  - Provide therapeutic and educational activities daily, encourage attendance and participation, and document same in the medical record  - Obtain collateral information, encourage visitation and family involvement in care   Outcome: Not Progressing  Goal: Recognize maladaptive responses and adopt new coping mechanisms  Outcome: Not Progressing  Goal: Complete daily ADLs, including personal hygiene independently, as able  Description: Interventions:  - Observe, teach, and assist patient with ADLS  - Monitor and promote a balance of rest/activity, with adequate nutrition and elimination  Outcome: Not Progressing     Problem: Depression  Goal: Treatment Goal: Demonstrate behavioral control of depressive symptoms, verbalize feelings of improved mood/affect, and adopt new coping skills prior to discharge  Outcome: Not Progressing  Goal: Verbalize thoughts and feelings  Description: Interventions:  - Assess and re-assess patient's level of risk   - Engage patient in 1:1 interactions, daily, for a minimum of 15 minutes   - Encourage patient to express feelings, fears, frustrations, hopes   Outcome: Not Progressing  Goal: Refrain from harming self  Description: Interventions:  - Monitor patient closely, per order   - Supervise medication ingestion, monitor effects and side effects   Outcome: Not Progressing  Goal: Refrain from isolation  Description: Interventions:  - Develop a trusting relationship   - Encourage socialization   Outcome: Not Progressing  Goal: Refrain from self-neglect  Outcome: Not Progressing  Goal: Attend and participate in unit activities, including therapeutic, recreational, and educational groups  Description: Interventions:  - Provide therapeutic and educational activities daily, encourage attendance and participation, and document same in the medical  record   Outcome: Not Progressing  Goal: Complete daily ADLs, including personal hygiene independently, as able  Description: Interventions:  - Observe, teach, and assist patient with ADLS  -  Monitor and promote a balance of rest/activity, with adequate nutrition and elimination   Outcome: Not Progressing     Problem: Anxiety  Goal: Anxiety is at manageable level  Description: Interventions:  - Assess and monitor patient's anxiety level.   - Monitor for signs and symptoms (heart palpitations, chest pain, shortness of breath, headaches, nausea, feeling jumpy, restlessness, irritable, apprehensive).   - Collaborate with interdisciplinary team and initiate plan and interventions as ordered.  - [place] patient to unit/surroundings  - Explain treatment plan  - Encourage participation in care  - Encourage verbalization of concerns/fears  - Identify coping mechanisms  - Assist in developing anxiety-reducing skills  - Administer/offer alternative therapies  - Limit or eliminate stimulants  Outcome: Not Progressing     Problem: Risk for Violence/Aggression Toward Others  Goal: Treatment Goal: Refrain from acts of violence/aggression during length of stay, and demonstrate improved impulse control at the time of discharge  Outcome: Not Progressing  Goal: Verbalize thoughts and feelings  Description: Interventions:  - Assess and re-assess patient's level of risk, every waking shift  - Engage patient in 1:1 interactions, daily, for a minimum of 15 minutes   - Allow patient to express feelings and frustrations in a safe and non-threatening manner   - Establish rapport/trust with patient   Outcome: Not Progressing  Goal: Refrain from harming others  Outcome: Not Progressing  Goal: Refrain from destructive acts on the environment or property  Outcome: Not Progressing  Goal: Control angry outbursts  Description: Interventions:  - Monitor patient closely, per order  - Ensure early verbal de-escalation  - Monitor prn medication  needs  - Set reasonable/therapeutic limits, outline behavioral expectations, and consequences   - Provide a non-threatening milieu, utilizing the least restrictive interventions   Outcome: Not Progressing  Goal: Attend and participate in unit activities, including therapeutic, recreational, and educational groups  Description: Interventions:  - Provide therapeutic and educational activities daily, encourage attendance and participation, and document same in the medical record   Outcome: Not Progressing  Goal: Identify appropriate positive anger management techniques  Description: Interventions:  - Offer anger management and coping skills groups   - Staff will provide positive feedback for appropriate anger control  Outcome: Not Progressing

## 2023-12-02 NOTE — NURSING NOTE
Patient is a 201 [place] [facility] due to intentional overdosed on 21 tablets trazodone 100 mg. Per ED report patient felt suicidal since her discharge here 2 weeks ago as a result of all her medications being messed up. Patient stated she also got bad news that her MRI has been postponed for another 6 months which exacerbated her hopelessness. Patient has hx of multiple SA. Hx of schizoaffective d/o bipolar type, MDD, mood d/o, anxiety, borderline personality d/o, PE, polysubstance abuse, Sjogren's syndrome. Patient was cooperative with the admission process and endorses depression, denies anxiety, SI/HI, A/V/H and pain. Skin intact. Oriented x 4. Q 7 minutes safety checks initiated.

## 2023-12-02 NOTE — ASSESSMENT & PLAN NOTE
"CTA chest in September 2023: \"Large saddle embolus is seen which extends into the right and left lower lobar and several bilateral lower lobe segmental pulmonary arterial branches. There is associated evidence of right heart strain.\"  Repeat imaging showed improvement in clot burden followed by resolution of clot, however, was subopitmal imaging   Maintained on Coumadin 7 mg daily  INR slightly subtherapeutic at 1.99  Increase Coumadin to 10 mg daily  Monitor INR, goal 2-3   Will likely need to lower Coumadin dosing once INR becomes therapeutic   "

## 2023-12-02 NOTE — ED NOTES
Pt transported to [facility] via [facility]. Belongings removed from [address] [place] [address] cabinet and given to [address] [place] [address] crew.       Arnel Lay RN  12/01/23 2015

## 2023-12-02 NOTE — ASSESSMENT & PLAN NOTE
Patient is medically cleared for admission to [facility] and treatment of underlying psychiatric illness based on available results  No acute medical needs. Medicine will sign off. Please call with additional questions or concerns

## 2023-12-02 NOTE — TREATMENT PLAN
TREATMENT PLAN REVIEW - Behavioral Health Blanca Mason 53 y.o. 1970 female MRN: [id number]    [facility] 6B [facility] Room / Bed: [facility] 651-02 Encounter: [id number]          Admit Date/Time:  12/1/2023  8:47 PM    Treatment Team: Attending Provider: Frederick Smith MD; Patient Care Assistant: Aaliyah Miles; Registered Nurse: Aram Meyer RN; Patient Care Assistant: Noemi Hale; Patient Care Assistant: Shira Ureña; Advanced Practitioner: JHONATAN Fields    Diagnosis: Principal Problem:    Schizoaffective disorder, bipolar type (HCC)  Active Problems:    Benign essential hypertension    Edema    Hypothyroidism    Overactive bladder    Sjogren's syndrome (HCC)    Medical clearance for psychiatric admission    Anxiety    Borderline personality disorder (HCC)    History of pulmonary embolism      Patient Strengths/Assets: ability for insight, capable of independent living, interpersonal skills    Patient Barriers/Limitations: lack of social/family support, low self esteem, poor past treatment response    Short Term Goals: decrease in depressive symptoms, decrease in suicidal thoughts, improvement in self care    Long Term Goals: improvement in depression, free of suicidal thoughts, improvement in reality testing    Progress Towards Goals: starting psychiatric medications as prescribed    Recommended Treatment: medication management, patient medication education, group therapy, milieu therapy, continued Behavioral Health psychiatric evaluation/assessment process    Treatment Frequency: daily medication monitoring, group and milieu therapy daily, monitoring through interdisciplinary rounds, monitoring through weekly patient care conferences    Expected Discharge Date:  12/10/2023    Discharge Plan: return to previous work    Treatment Plan Created/Updated By: Anatoly Prajapati DO

## 2023-12-02 NOTE — NURSING NOTE
"Patient is medication and meal compliant. No complaints of pain or discomfort. Patient endorses anxiety and depression reporting that \"I'm just here to get my medications straightened out\".  Patient denies current SI, AH or VH.  Patient was discharged from behavioral health on 11/10/23 and then attempted to overdose on Trazodone at home and subsequently came back to the hospital. Will continue to monitor patient for changes in mood or behavior.  "

## 2023-12-02 NOTE — H&P
"Psychiatric Evaluation - Behavioral Health     Identification Data:Blanca Maosn 53 y.o. female MRN: [id number]  Unit/Bed#: [facility] 651-02 Encounter: [id number]    Chief Complaint: \"I wanted to die\" and suicide attempt by overdose    History of Present Illness     Blanca Mason is a 53 y.o. female with a history of Schizoaffective Disorder who was admitted to the inpatient older adult psychiatric unit on a voluntary 201 commitment basis due to  overdose on Trazodone 100mg 21 tablets on Thursday night 11/30.  Medically cleared in [facility] ED .  Per crisis note from 12/01: \"Patient is a 53-year-old female with longstanding mental illness to include schizoaffective disorder who arrived by EMS for intentional overdose.  Patient has had multiple suicide attempts and subsequent inpatient  admissions.  Last  admission was at [facility] from 11/10-11/17/23. Patient is connected with [facility].  Patient now is medically cleared. On assessment patient was fully oriented, blunted, logical, soft spoken, calm and cooperative.  Patient states that [place] \"messed my meds up\".  Patient  reports chronic pain and feels like she has no purpose . Yesterday she the \"got bad news\" that her MRI has been postponed for another 6 months which exacerbated her hopelessness. Today at approximately 0530 she ingested 21 trazodone pills as an attempt to end her life. Patient states \"All I could find in the bottle..I called my friend for Vicodin but couldn't get them.\"  Patient states that she called her [facility] worker following the overdose and they called 911. Regarding current thoughts of hurting herself, she nods her heads yes. Patient endorses feelings of hopelessness and anhedonia. States all she does is lay around on day in her chair and sleep. 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 Patient states she has not cut her herself/self harmed since being prescribed coumadin " "last September. Patient denies HI/AVH.  Patient states that her appetite and sleep fluctuates.  Reports she sleeps in a recliner due to chronic pain. States she is 2 months sober of all drugs/ETOH though is prescribed medical marijuana. Patient is connected with [facility].  No longer sees a therapist at [facility] because\" I was double dipping with insurance\".  Lives alone with Cat. Called her father to take care of her cat. Denies access to firearms or legal issues.      Patient signed 201.  Rights and 72-hour notice were explained and understood. Patient is requesting admission at [facility] because \"I need a hospital type bed to sleep\".  Patient gave verbal consent for [facility] to be updated. \"    On evaluation in the inpatient psychiatric unit Blanca states she regrets her suicide attempt.  States she feels that she is not taking medications with because they were not in a posterior pack.  States that she was feeling more depressed, and began having suicidal thoughts, felt that she was \"bored\".  States that she has not been working, and she actually reached out to her friends to try and get Vicodin with a plan to overdose to kill herself.  States that she is feeling frustrated because she is not able to get more MRI, as she was told that she has to get special sedation and this will take 3 months to get this done.  States that she was feeling overwhelmed and \"like nothing goes well\".  States she does have people depending on her, and she wants to go back to work. Denies thoughts to hurt herself now, regrets her suicide attempt. States she feels safe here on the unit.   States she did followup with her haldol injection on 11/22. States she just had some medication adjustments, including increasing her Wellbutrin and Latuda. States she wants to continue with this plan, but has not started the increased dose yet.       Psychiatric Review Of Systems:    Sleep changes: decreased  Appetite changes:no, no change  Weight " changes: no  Energy: decreased  Interest/pleasure/: decreased  Anhedonia: no  Anxiety: worrying  Pooja: past manic episodes, past mixed episodes, history of periods of irritable mood, lasting several days in a row  Guilt:  yes  Hopeless:  yes  Self injurious behavior/risky behavior: no  Suicidal ideation: no  Homicidal ideation: no  Auditory hallucinations: no  Visual hallucinations: no  Delusional thinking: no  Eating disorder history: no  Obsessive/compulsive symptoms: no    Historical Information     Past Psychiatric History:     Past Inpatient Psychiatric Treatment:   Multiple past inpatient psychiatric admissions  Most recently in [facility] 9/28-10/06/2023  Past Outpatient Psychiatric Treatment:    Currently in outpatient psychiatric treatment with a psychiatrist, works with [facility] team  Past Suicide Attempts: yes, yes, by overdose on medications  Past Violent Behavior:none  Past Psychiatric Medication Trials: multiple psychiatric medication trials        Substance Abuse History:    Social History       Tobacco History       Smoking Status  Former Smoking Start Date  1/1/1985 Quit Date  1/2/2018 Smoking Frequency  2.00 packs/day for 30.00 years (60.00 ttl pk-yrs)    Smoking Tobacco Type  Cigarettes from 1/1/1985 to 1/2/2018      Smokeless Tobacco Use  Never      Tobacco Comments  Started at age 16.              Alcohol History       Alcohol Use Status  Not Currently Drinks/Week  0 Glasses of wine, 0 Cans of beer, 0 Shots of liquor, 0 Standard drinks or equivalent per week Comment  Recovering alcoholic              Drug Use       Drug Use Status  Yes Types  Marijuana, Methamphetamines Comment  2 months off meth              Sexual Activity       Sexually Active  Not Currently Partners  Male Comment  defer              Activities of Daily Living    Not Asked                 Additional Substance Use Detail       Questions Responses    Problems Due to Past Use of Alcohol? No    Problems Due to Past  Use of Substances? No    Substance Use Assessment Substance use within the past 12 months    Alcohol Use Frequency Denies use in past 12 months    Cannabis frequency Daily    Comment: 3 or more times/week on 9/12/2018 3 or more times/week -> Daily on 7/21/2022     Heroin Frequency Denies use in past 12 months    Alcohol Drink of Choice wine coolers    Cannabis method Smoke    Comment: Smoke on 9/12/2018     1st Use of Alcohol 15 years old    Cannabis 1st Use 15 years old    Last Use of Alcohol & Amount 3 years ago 1 wine cooler    Cannabis last use 7/13/22    Comment: Has a medical marijuana card     Longest Abstinence from Alcohol 3 years    Cannabis Longest Abstinence unclear    Cocaine frequency Past regular use    Comment: Never used on 12/22/2021 Never used -> 3 or more times/week on 7/21/2022 3 or more times/week -> Past regular use on 11/10/2023     Crack Cocaine Frequency Denies use in past 12 months    Methamphetamine Frequency Experimented    Methamphetamine Method Snort    Cocaine First Use 15 years old    Methamphetamine 1st Use 41 years old    Cocaine last use     Comment: quit in her 30's     Methamphetamine Last Use & Amount 7/13/22    Cocaine Longest Abstinence Over 10-20 years    Methamphetamine Longest Abstinence unclear    Narcotic Frequency Denies use in past 12 months    Benzodiazepine Frequency Denies use in past 12 months    Amphetamine frequency Denies use in past 12 months    Barbituate Frequency Denies use use in past 12 months    Inhalant frequency Never used    Comment: Never used on 7/6/2021     Hallucinogen frequency Never used    Comment: Never used on 7/6/2021     Ecstasy frequency Never used    Comment: Never used on 7/6/2021     Other drug frequency Never used    Comment: Never used on 12/22/2021     Opiate frequency Denies use in past 12 months    Not reviewed.          I have assessed this patient for substance use within the past 12 months    Alcohol use: none  "currently  Recreational drug use: hx of methamphetamine abuse, has not relapsed, been sober approx 3 months.    Family Psychiatric History: fam hx of depression    Social History:    Education: high school diploma/GED  Marital History: single  Children: none  Living Arrangement: lives alone in an apartment  Occupational History: works [employer]  Functioning Relationships: good support system  Legal History: none   History: None    Traumatic History:  mother's death traumatic,  on hospice care in .       Past Medical History:    History of seizure or head trauma: denies    Past Medical History:   Diagnosis Date    Anxiety     Arthritis     oa cassandra knees    Asthma     good control- no medications    Yan's esophagus     Bipolar affective disorder (HCC)     Cannabis abuse with unspecified 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HCC)     Chronic pain disorder     lumbar    COPD (chronic obstructive pulmonary disease) (HCC)     CPAP (continuous positive airway pressure) dependence     DDD (degenerative disc disease), lumbar 2015    Degenerative disc disease at L5-S1 level     Deliberate self-cutting     9/15/22per pt has not done recently-- does see a therapist and psychiatrist regularly    Depression 2008    Disease of thyroid gland     hypo    MARTINEZ (dyspnea on exertion)     per pt \"has had this with exertion and not new\"    Drug overdose 10/28/2008    suicide attempt and again drug overdose 2022-- [facility] floor and than transferred to [facility]    Dysphagia     Dyspnea     Edema     BLE    Family history of blood clots     GERD (gastroesophageal reflux disease)     Headache(784.0)     Headache, tension-type     Hemorrhage of rectum and anus 10/02/2017    Formatting of this note might be different from the original.  Added automatically from request for surgery [id number]    High blood pressure     History of COVID-19 2020    Symptoms started on 2020 and then got worse.  Today she is feeling a " little bit better.  She is a candidate for monoclonal antibody infusion and set for 1/6/21 @ 1pm at [facility]. 01/07/21 - telemedicine -     Hyperlipidemia     Hypertension     Knee pain, bilateral     Especially right    Medial epicondylitis of elbow, left 04/03/2018    Formatting of this note might be different from the original.  Added automatically from request for surgery [id number]    Medial epicondylitis of right elbow 07/17/2023    Medical marijuana use     Memory loss     Migraines     Obesity     Overactive bladder     Primary osteoarthritis of both knees 08/08/2018    Pulmonary emboli (HCC)     Restrictive lung disease 02/01/2017    Last Assessment & Plan:   Formatting of this note might be different from the original.  I reviewed this problem throughout the rest of the note. Please refer to the other assessments for details.    Sjogren's disease (HCC)     Sleep apnea     Stress incontinence     Suicidal ideations     Teeth missing     Tremor     per pt Tremors of Right Leg and both Arms    Visual impairment     Wears glasses      Past Surgical History:   Procedure Laterality Date    BREAST CYST EXCISION Right 1989    CARPAL TUNNEL RELEASE Left     CHOLECYSTECTOMY  05/2003    Laparoscopic    COLONOSCOPY      01/12/2009    DILATION AND CURETTAGE OF UTERUS      ELBOW SURGERY Left     x2. No hardware    ESOPHAGOGASTRODUODENOSCOPY      FOOT SURGERY Left     Plantar fasciotomy    HERNIA REPAIR      HYSTERECTOMY      laporoscopic, partial    KNEE ARTHROSCOPY Bilateral     OOPHORECTOMY Left 10/2015    KS GASTROCNEMIUS RECESSION Left 02/24/2021    Procedure: RECESSION GASTROC OPEN;  Surgeon: Nomi Yang DPM;  Location:  MAIN OR;  Service: Podiatry    KS RPR UMBILICAL HRNA 5 YRS/> REDUCIBLE N/A 04/24/2019    Procedure: REPAIR HERNIA UMBILICAL LAPAROSCOPIC W/ ROBOTICS;  Surgeon: Anahi Colon MD;  Location: [facility] Main OR;  Service: General    KS SPHINCTEROTOMY ANAL DIVISION SPHINCTER SPX N/A 08/31/2018     "Procedure: EUA, LEFT PARTIAL INTERNAL SPHINCTEROTOMY;  Surgeon: Tien Reyes MD;  Location:  MAIN OR;  Service: Colorectal    ID TNOT ELBOW LATERAL/MEDIAL DEBRIDE OPEN TDN RPR Left 09/20/2022    Procedure: RELEASE EPICONDYLAR ELBOW MEDIAL;  Surgeon: Kyree Winkler MD;  Location: [facility] MAIN OR;  Service: Orthopedics    REDUCTION MAMMAPLASTY Bilateral 1999    REPAIR LACERATION Left     left hand-5/18/2009    REPLACEMENT TOTAL KNEE Right     ROTATOR CUFF REPAIR Left     TONSILECTOMY AND ADNOIDECTOMY      US GUIDED MSK PROCEDURE  04/22/2021    VASCULAR SURGERY      VEIN LIGATION Bilateral     WISDOM TOOTH EXTRACTION         Medical Review Of Systems:    A comprehensive review of systems was negative.    Allergies:    Allergies   Allergen Reactions    Percocet [Oxycodone-Acetaminophen] Headache     Severe headaches   (denies issues with Tylenol)    Povidone Iodine Rash     Unsure if betadine or gauze post surgical. Got rash on leg.   Has  Had itchy rashes after every surgery prep and IV insertion    Chlorhexidine Rash     Per pt \"able to use the liquid soap--thinkd reaction from the sponges or wipes\"       Medications:     All current active medications have been reviewed.    OBJECTIVE:    Vital signs in last 24 hours:    Temp:  [97.5 °F (36.4 °C)-98.6 °F (37 °C)] 98.6 °F (37 °C)  HR:  [71-84] 75  Resp:  [14-18] 16  BP: (129-167)/(58-99) 145/79      Intake/Output Summary (Last 24 hours) at 12/2/2023 0915  Last data filed at 12/2/2023 0831  Gross per 24 hour   Intake 280 ml   Output --   Net 280 ml        Mental Status Evaluation:    Appearance:  age appropriate, casually dressed, overweight   Behavior:  guarded   Speech:  normal rate, normal volume   Mood:  dysphoric, anxious   Affect:  constricted   Language: naming objects and repeating phrases   Thought Process:  goal directed, linear   Associations: intact associations   Thought Content:  normal, no overt delusions   Perceptual Disturbances: none   Risk " Potential: Suicidal ideation - None at present, status post suicide attempt by overdose on trazodone, remorseful about suicide attempt, contracts for safety on the unit, would talk to staff if not feeling safe on the unit  Homicidal ideation - None  Potential for aggression - No   Sensorium:  oriented to person, place, and time/date   Memory:  recent and remote memory grossly intact   Consciousness:  alert and awake   Attention: attention span and concentration are age appropriate   Intellect: within normal limits   Fund of Knowledge: awareness of current events: yes  past history: yes   Insight:  limited   Judgment: limited   Muscle Strength Muscle Tone: normal  normal   Gait/Station: normal gait/station, normal balance   Motor Activity: no abnormal movements       Laboratory Results: I have personally reviewed all pertinent laboratory/tests results.    Imaging Studies: XR chest 1 view portable    Result Date: 12/1/2023  Narrative: CHEST INDICATION:   shortness of breath. COMPARISON: 11/22/2023 EXAM PERFORMED/VIEWS:  XR CHEST PORTABLE Single view FINDINGS: Cardiomediastinal silhouette appears unremarkable. The lungs are clear.  No pneumothorax or pleural effusion. Osseous structures appear within normal limits for patient age.     Impression: No acute cardiopulmonary disease. Findings are stable Workstation performed: [id number]     XR knee 4+ vw left injury    Result Date: 11/29/2023  Narrative: This result has an attachment that is not available. AP, PA FLEX, LAT, and Amo views of Left knee show moderate degenerative changes with moderate loss of the medial joint space. There is moderate osteophyte formation and subchondral sclerosis. No fractures or dislocation. There is a  varus deformity. This report is limited to orthopaedic interpretation.    XR chest 1 view portable    Result Date: 11/22/2023  Narrative: CHEST INDICATION:   chest pain. COMPARISON: 10/15/2023 EXAM PERFORMED/VIEWS:  XR CHEST PORTABLE  FINDINGS: Cardiomediastinal silhouette appears unremarkable. The lungs are clear.  No pneumothorax or pleural effusion. Osseous structures appear within normal limits for patient age.     Impression: No acute cardiopulmonary disease. Workstation performed: [id number]     CTA ED chest PE Study    Result Date: 11/22/2023  Narrative: CTA - CHEST WITH IV CONTRAST - PULMONARY ANGIOGRAM INDICATION:   chest pain, dyspnea. COMPARISON: 9/20/2023. TECHNIQUE: CTA examination of the chest was performed using angiographic technique according to a protocol specifically tailored to evaluate for pulmonary embolism.  Multiplanar 2D reformatted images were created from the source data. In addition, coronal 3D MIP postprocessing was performed on the acquisition scanner. Radiation dose length product (DLP) for this visit:  760.6 mGy-cm .  This examination, like all CT scans performed in the [facility], was performed utilizing techniques to minimize radiation dose exposure, including the use of iterative reconstruction and automated exposure control. IV Contrast:  100 mL of iohexol (OMNIPAQUE) FINDINGS: Exam limited by mild motion artifact. PULMONARY ARTERIAL TREE:  No pulmonary embolism is seen to the segmental level with evaluation of the more peripheral subsegmental pulmonary arteries limited by timing of contrast bolus and mild motion artifact. LUNGS: Central airways are patent.  There is no tracheal or endobronchial lesion. No lobar airspace consolidation no suspicious pulmonary parenchymal mass. Mild bibasilar dependent atelectasis. PLEURA:  Unremarkable without pneumothorax or pleural effusions. HEART/GREAT VESSELS: Heart is normal in size. No pericardial effusion. No thoracic aortic aneurysm or dissection. Visualized proximal thoracic great vessels are patent with normal course and caliber. MEDIASTINUM AND RUEL:  Unremarkable without mass or lymphadenopathy. Visualized thyroid glands appear grossly unremarkable.  Esophagus is normal in course and caliber. No significant prevertebral soft tissue swelling or mass noted.. CHEST WALL AND LOWER NECK:   Unremarkable. VISUALIZED STRUCTURES IN THE UPPER ABDOMEN: No free air or free fluid. Hepatomegaly noted. Gallbladder is surgically absent. Visualized portions of the spleen, pancreas, adrenal glands, stomach, and upper abdominal bowel loops are grossly unremarkable.. OSSEOUS STRUCTURES:  No acute fracture or destructive osseous lesion. Mild multilevel degenerative changes of the thoracic spine.     Impression: 1.  No pulmonary embolism is seen to the segmental level with evaluation of the more peripheral subsegmental pulmonary arteries limited by timing of contrast bolus and mild motion artifact. 2.  No thoracic aortic aneurysm or dissection. 3.  No acute intrathoracic abnormalities with ancillary findings detailed above. Workstation performed: [id number]     CT abdomen pelvis with contrast    Result Date: 11/19/2023  Narrative: CT ABDOMEN AND PELVIS WITH IV CONTRAST INDICATION:   LLQ abdominal pain LLQ abdomien pain. COMPARISON: 8/22/2023 TECHNIQUE:  CT examination of the abdomen and pelvis was performed. Multiplanar 2D reformatted images were created from the source data. This examination, like all CT scans performed in the [facility], was performed utilizing techniques to minimize radiation dose exposure, including the use of iterative reconstruction and automated exposure control. Radiation dose length product (DLP) for this visit:  1967.5 mGy-cm IV Contrast:  100 mL of iohexol (OMNIPAQUE) Enteric Contrast:  Enteric contrast was not administered. FINDINGS: ABDOMEN LOWER CHEST:  No clinically significant abnormality identified in the visualized lower chest. LIVER/BILIARY TREE: Hepatomegaly noted. GALLBLADDER: Gallbladder is surgically absent. SPLEEN:  Unremarkable. PANCREAS:  Unremarkable. ADRENAL GLANDS:  Unremarkable. KIDNEYS/URETERS:  Unremarkable. No  hydronephrosis. STOMACH AND BOWEL: There is colonic diverticulosis without evidence of acute diverticulitis. Mild constipation. APPENDIX: A normal appendix was visualized. ABDOMINOPELVIC CAVITY:  No ascites.  No pneumoperitoneum.  No lymphadenopathy. VESSELS:  Unremarkable for patient's age. PELVIS REPRODUCTIVE ORGANS:  Unremarkable for patient's age. URINARY BLADDER:  Unremarkable. ABDOMINAL WALL/INGUINAL REGIONS:  Unremarkable. OSSEOUS STRUCTURES:  No acute fracture or destructive osseous lesion.     Impression: 1. Mild constipation noted. 2. No acute inflammatory or infectious process with a normal appendix identified. Workstation performed: [id number]       Code Status: Level 1 - Full Code  Advance Directive and Living Will: <no information>    Assessment/Plan   Principal Problem:    Schizoaffective disorder, bipolar type (HCC)  Active Problems:    Medical clearance for psychiatric admission    Anxiety    Borderline personality disorder (HCC)      Patient Strengths: ability for insight, adapts well, compliant with medication, motivated, sense of humor, self reliant     Patient Barriers: low self esteem, poor physical health, unresourceful    Treatment Plan:     Planned Treatment and Medication Changes:  Will restart patient's psychotropic medications including Latuda 80 mg daily, Wellbutrin  mg daily, Topamax 200 mg twice daily, naltrexone 50 mg daily, melatonin 3 mg nightly, clonidine 0.1 mg twice daily, atomoxetine 40 mg daily, and Trileptal 300 mg twice daily.      All current active medications have been reviewed  Encourage group therapy, milieu therapy and occupational therapy  Behavioral Health checks every 7 minutes      Risks / Benefits of Treatment:    Risks, benefits, and possible side effects of medications explained to patient and patient verbalizes understanding and agreement for treatment.    Counseling / Coordination of Care:    Total floor / unit time spent today 45 minutes. Greater than  50% of total time was spent with the patient and / or family counseling and / or coordination of care. A description of the counseling / coordination of care:   Patient's presentation on admission and proposed treatment plan discussed with treatment team.  Diagnosis, medication changes and treatment plan reviewed with patient.    Inpatient Psychiatric Certification:    Estimated length of stay: 7 midnights  CERTIFICATION of Patient Admission. Required At Time Of Admission    I certify that services are required to be given on an inpatient basis because of the above named patient's need for psychiatric care on a continuous basis for the condition(s) for which she is receiving; Active treatment which could reasonably be expect to improve the patient's condition.  Diagnostic Studies    Anatoly Prajapati, DO  12/02/23      Anatoly Prajapati, DO 12/02/23

## 2023-12-02 NOTE — PLAN OF CARE
Problem: Ineffective Coping  Goal: Cooperates with admission process  Description: Interventions:   - Complete admission process  12/2/2023 0127 by Oscar Puente RN  Outcome: Not Progressing  12/1/2023 2148 by Oscar Puente RN  Outcome: Not Progressing  Goal: Identifies ineffective coping skills  12/2/2023 0127 by Oscar Puente RN  Outcome: Not Progressing  12/1/2023 2148 by Oscar Puente RN  Outcome: Not Progressing  Goal: Identifies healthy coping skills  12/2/2023 0127 by Oscar Puente RN  Outcome: Not Progressing  12/1/2023 2148 by Oscar Puente RN  Outcome: Not Progressing  Goal: Demonstrates healthy coping skills  12/2/2023 0127 by Oscar Puente RN  Outcome: Not Progressing  12/1/2023 2148 by Oscar Puente RN  Outcome: Not Progressing  Goal: Participates in unit activities  Description: Interventions:  - Provide therapeutic environment   - Provide required programming   - Redirect inappropriate behaviors   12/2/2023 0127 by Oscar Puente RN  Outcome: Not Progressing  12/1/2023 2148 by Oscar Puente RN  Outcome: Not Progressing  Goal: Patient/Family participate in treatment and DC plans  Description: Interventions:  - Provide therapeutic environment  12/2/2023 0127 by Oscar Puente RN  Outcome: Not Progressing  12/1/2023 2148 by Oscar Puente RN  Outcome: Not Progressing  Goal: Patient/Family verbalizes awareness of resources  12/2/2023 0127 by Oscar Puente RN  Outcome: Not Progressing  12/1/2023 2148 by Oscar Puente RN  Outcome: Not Progressing  Goal: Understands least restrictive measures  Description: Interventions:  - Utilize least restrictive behavior  12/2/2023 0127 by Oscar Puente RN  Outcome: Not Progressing  12/1/2023 2148 by Oscar Puente RN  Outcome: Not Progressing  Goal: Free from restraint events  Description: - Utilize least restrictive measures   - Provide behavioral interventions   - Redirect inappropriate behaviors    12/2/2023 0127 by Oscar Puente RN  Outcome: Not Progressing  12/1/2023 2148 by Oscar Puente RN  Outcome: Not Progressing     Problem: Risk for Self Injury/Neglect  Goal: Treatment Goal: Remain safe during length of stay, learn and adopt new coping skills, and be free of self-injurious ideation, impulses and acts at the time of discharge  12/2/2023 0127 by Oscar Puente RN  Outcome: Not Progressing  12/1/2023 2148 by Oscar Puente RN  Outcome: Not Progressing  Goal: Verbalize thoughts and feelings  Description: Interventions:  - Assess and re-assess patient's lethality and potential for self-injury  - Engage patient in 1:1 interactions, daily, for a minimum of 15 minutes  - Encourage patient to express feelings, fears, frustrations, hopes  - Establish rapport/trust with patient   12/2/2023 0127 by Oscar Puente RN  Outcome: Not Progressing  12/1/2023 2148 by Oscar Puente RN  Outcome: Not Progressing  Goal: Refrain from harming self  Description: Interventions:  - Monitor patient closely, per order  - Develop a trusting relationship  - Supervise medication ingestion, monitor effects and side effects   12/2/2023 0127 by Oscar Puente RN  Outcome: Not Progressing  12/1/2023 2148 by Oscar Puente RN  Outcome: Not Progressing  Goal: Attend and participate in unit activities, including therapeutic, recreational, and educational groups  Description: Interventions:  - Provide therapeutic and educational activities daily, encourage attendance and participation, and document same in the medical record  - Obtain collateral information, encourage visitation and family involvement in care   12/2/2023 0127 by Oscar Puente RN  Outcome: Not Progressing  12/1/2023 2148 by Oscar Puente RN  Outcome: Not Progressing  Goal: Recognize maladaptive responses and adopt new coping mechanisms  12/2/2023 0127 by Oscar Puente RN  Outcome: Not Progressing  12/1/2023 2148 by Oscar Puente  RN  Outcome: Not Progressing  Goal: Complete daily ADLs, including personal hygiene independently, as able  Description: Interventions:  - Observe, teach, and assist patient with ADLS  - Monitor and promote a balance of rest/activity, with adequate nutrition and elimination  12/2/2023 0127 by Oscar Puente RN  Outcome: Not Progressing  12/1/2023 2148 by Oscar Puente RN  Outcome: Not Progressing     Problem: Depression  Goal: Treatment Goal: Demonstrate behavioral control of depressive symptoms, verbalize feelings of improved mood/affect, and adopt new coping skills prior to discharge  12/2/2023 0127 by Oscar Puente RN  Outcome: Not Progressing  12/1/2023 2148 by Oscar Puente RN  Outcome: Not Progressing  Goal: Verbalize thoughts and feelings  Description: Interventions:  - Assess and re-assess patient's level of risk   - Engage patient in 1:1 interactions, daily, for a minimum of 15 minutes   - Encourage patient to express feelings, fears, frustrations, hopes   12/2/2023 0127 by Oscar Puente RN  Outcome: Not Progressing  12/1/2023 2148 by Oscar Puente RN  Outcome: Not Progressing  Goal: Refrain from harming self  Description: Interventions:  - Monitor patient closely, per order   - Supervise medication ingestion, monitor effects and side effects   12/2/2023 0127 by Oscar Puente RN  Outcome: Not Progressing  12/1/2023 2148 by Oscar Puente RN  Outcome: Not Progressing  Goal: Refrain from isolation  Description: Interventions:  - Develop a trusting relationship   - Encourage socialization   12/2/2023 0127 by Oscar Puente RN  Outcome: Not Progressing  12/1/2023 2148 by Oscar Puente RN  Outcome: Not Progressing  Goal: Refrain from self-neglect  12/2/2023 0127 by Oscar Puente RN  Outcome: Not Progressing  12/1/2023 2148 by Oscar Puente RN  Outcome: Not Progressing  Goal: Attend and participate in unit activities, including therapeutic, recreational, and  educational groups  Description: Interventions:  - Provide therapeutic and educational activities daily, encourage attendance and participation, and document same in the medical record   12/2/2023 0127 by Oscar Puente RN  Outcome: Not Progressing  12/1/2023 2148 by Oscar Puente RN  Outcome: Not Progressing  Goal: Complete daily ADLs, including personal hygiene independently, as able  Description: Interventions:  - Observe, teach, and assist patient with ADLS  -  Monitor and promote a balance of rest/activity, with adequate nutrition and elimination   12/2/2023 0127 by Oscar Puente RN  Outcome: Not Progressing  12/1/2023 2148 by Oscar Puente RN  Outcome: Not Progressing     Problem: Anxiety  Goal: Anxiety is at manageable level  Description: Interventions:  - Assess and monitor patient's anxiety level.   - Monitor for signs and symptoms (heart palpitations, chest pain, shortness of breath, headaches, nausea, feeling jumpy, restlessness, irritable, apprehensive).   - Collaborate with interdisciplinary team and initiate plan and interventions as ordered.  - [place] patient to unit/surroundings  - Explain treatment plan  - Encourage participation in care  - Encourage verbalization of concerns/fears  - Identify coping mechanisms  - Assist in developing anxiety-reducing skills  - Administer/offer alternative therapies  - Limit or eliminate stimulants  12/2/2023 0127 by Oscar Puente RN  Outcome: Not Progressing  12/1/2023 2148 by Oscar Puente RN  Outcome: Not Progressing     Problem: Risk for Violence/Aggression Toward Others  Goal: Treatment Goal: Refrain from acts of violence/aggression during length of stay, and demonstrate improved impulse control at the time of discharge  12/2/2023 0127 by Oscar Puente RN  Outcome: Not Progressing  12/1/2023 2148 by Oscar Puente RN  Outcome: Not Progressing  Goal: Verbalize thoughts and feelings  Description: Interventions:  - Assess and  re-assess patient's level of risk, every waking shift  - Engage patient in 1:1 interactions, daily, for a minimum of 15 minutes   - Allow patient to express feelings and frustrations in a safe and non-threatening manner   - Establish rapport/trust with patient   12/2/2023 0127 by Oscar Puente RN  Outcome: Not Progressing  12/1/2023 2148 by Oscar Puente RN  Outcome: Not Progressing  Goal: Refrain from harming others  12/2/2023 0127 by Oscar Puente RN  Outcome: Not Progressing  12/1/2023 2148 by Oscar Puente RN  Outcome: Not Progressing  Goal: Refrain from destructive acts on the environment or property  12/2/2023 0127 by Oscar Puente RN  Outcome: Not Progressing  12/1/2023 2148 by Oscar Puente RN  Outcome: Not Progressing  Goal: Control angry outbursts  Description: Interventions:  - Monitor patient closely, per order  - Ensure early verbal de-escalation  - Monitor prn medication needs  - Set reasonable/therapeutic limits, outline behavioral expectations, and consequences   - Provide a non-threatening milieu, utilizing the least restrictive interventions   12/2/2023 0127 by Oscar Puente RN  Outcome: Not Progressing  12/1/2023 2148 by Oscar Puente RN  Outcome: Not Progressing  Goal: Attend and participate in unit activities, including therapeutic, recreational, and educational groups  Description: Interventions:  - Provide therapeutic and educational activities daily, encourage attendance and participation, and document same in the medical record   12/2/2023 0127 by Oscar Puente RN  Outcome: Not Progressing  12/1/2023 2148 by Oscar Puente RN  Outcome: Not Progressing  Goal: Identify appropriate positive anger management techniques  Description: Interventions:  - Offer anger management and coping skills groups   - Staff will provide positive feedback for appropriate anger control  12/2/2023 0127 by Oscar Puente RN  Outcome: Not Progressing  12/1/2023 2148 by Oscar  Bhavya Puente, RN  Outcome: Not Progressing

## 2023-12-03 LAB
ATRIAL RATE: 73 BPM
INR PPP: 2.35 (ref 0.84–1.19)
P AXIS: 65 DEGREES
PR INTERVAL: 188 MS
PROTHROMBIN TIME: 26 SECONDS (ref 11.6–14.5)
QRS AXIS: -12 DEGREES
QRSD INTERVAL: 92 MS
QT INTERVAL: 384 MS
QTC INTERVAL: 423 MS
T WAVE AXIS: 30 DEGREES
VENTRICULAR RATE: 73 BPM

## 2023-12-03 PROCEDURE — 99232 SBSQ HOSP IP/OBS MODERATE 35: CPT | Performed by: STUDENT IN AN ORGANIZED HEALTH CARE EDUCATION/TRAINING PROGRAM

## 2023-12-03 PROCEDURE — 85610 PROTHROMBIN TIME: CPT | Performed by: NURSE PRACTITIONER

## 2023-12-03 RX ORDER — SACCHAROMYCES BOULARDII 250 MG
250 CAPSULE ORAL 2 TIMES DAILY
Status: DISCONTINUED | OUTPATIENT
Start: 2023-12-03 | End: 2024-02-01 | Stop reason: HOSPADM

## 2023-12-03 RX ADMIN — CLONIDINE HYDROCHLORIDE 0.1 MG: 0.1 TABLET ORAL at 08:39

## 2023-12-03 RX ADMIN — CHOLECALCIFEROL TAB 25 MCG (1000 UNIT) 1000 UNITS: 25 TAB at 08:39

## 2023-12-03 RX ADMIN — MELATONIN TAB 3 MG 3 MG: 3 TAB at 21:09

## 2023-12-03 RX ADMIN — OXCARBAZEPINE 300 MG: 300 TABLET, FILM COATED ORAL at 21:09

## 2023-12-03 RX ADMIN — CEPHALEXIN 500 MG: 500 CAPSULE ORAL at 05:57

## 2023-12-03 RX ADMIN — LURASIDONE HYDROCHLORIDE 80 MG: 80 TABLET, FILM COATED ORAL at 08:39

## 2023-12-03 RX ADMIN — LOPERAMIDE HYDROCHLORIDE 2 MG: 2 CAPSULE ORAL at 17:11

## 2023-12-03 RX ADMIN — Medication 250 MG: at 13:18

## 2023-12-03 RX ADMIN — OXYBUTYNIN CHLORIDE 5 MG: 5 TABLET ORAL at 08:45

## 2023-12-03 RX ADMIN — CLONIDINE HYDROCHLORIDE 0.1 MG: 0.1 TABLET ORAL at 21:09

## 2023-12-03 RX ADMIN — CEPHALEXIN 500 MG: 500 CAPSULE ORAL at 23:35

## 2023-12-03 RX ADMIN — LEVOTHYROXINE SODIUM 125 MCG: 125 TABLET ORAL at 05:57

## 2023-12-03 RX ADMIN — Medication 250 MG: at 17:08

## 2023-12-03 RX ADMIN — CEPHALEXIN 500 MG: 500 CAPSULE ORAL at 17:08

## 2023-12-03 RX ADMIN — PANTOPRAZOLE SODIUM 40 MG: 40 TABLET, DELAYED RELEASE ORAL at 05:57

## 2023-12-03 RX ADMIN — OXCARBAZEPINE 300 MG: 300 TABLET, FILM COATED ORAL at 08:39

## 2023-12-03 RX ADMIN — WARFARIN SODIUM 10 MG: 5 TABLET ORAL at 17:08

## 2023-12-03 RX ADMIN — FUROSEMIDE 20 MG: 20 TABLET ORAL at 08:39

## 2023-12-03 RX ADMIN — ATOMOXETINE 40 MG: 40 CAPSULE ORAL at 08:39

## 2023-12-03 RX ADMIN — LOSARTAN POTASSIUM 100 MG: 50 TABLET, FILM COATED ORAL at 08:39

## 2023-12-03 RX ADMIN — OXYBUTYNIN CHLORIDE 5 MG: 5 TABLET ORAL at 17:12

## 2023-12-03 RX ADMIN — NALTREXONE HYDROCHLORIDE 50 MG: 50 TABLET, FILM COATED ORAL at 08:39

## 2023-12-03 RX ADMIN — TOPIRAMATE 200 MG: 100 TABLET, FILM COATED ORAL at 17:08

## 2023-12-03 RX ADMIN — TOPIRAMATE 200 MG: 100 TABLET, FILM COATED ORAL at 08:39

## 2023-12-03 RX ADMIN — BUPROPION HYDROCHLORIDE 450 MG: 300 TABLET, EXTENDED RELEASE ORAL at 08:39

## 2023-12-03 RX ADMIN — CEPHALEXIN 500 MG: 500 CAPSULE ORAL at 13:15

## 2023-12-03 NOTE — NURSING NOTE
"Pt calm and cooperative. Pleasant on approach Visible and social w/ peers and staff. Spends time watching TV, listening to music, coloring, and socializing w/ female peers. Pt denies SI/HI. Denies psychosis. Seems to minimize events leading up to admission. States, \"I'm just here to get my medications right.\" Staff support provided. Q7's maintained. Will cont to offer support as needed.   "

## 2023-12-03 NOTE — ED NOTES
Insurance Authorization for admission:   Phone call placed to [facility] to complete COB.  Phone number: [phone].     Spoke to Velia.     15 days approved.  Level of care: Inpatient Behavioral Health.  Review on 12/16/2023 to be completed with Cey[facility] (ext. [id number])  Authorization # [id number].

## 2023-12-03 NOTE — PROGRESS NOTES
"Progress Note - Behavioral Health     Blanca Mason 53 y.o. female MRN: [id number]   Unit/Bed#: [facility] 651-02 Encounter: [id number]    Behavior over the last 24 hours: improved.     Blanca seen today for followup.  States that she is feeling \"pretty good\", and she spoke with her father recently.  He states that he is at home with her cat, and he expressed no ill will towards her for her suicide attempt.  She admits that she feels guilty for disappointing him, and she states that there are other people that she feels care about her.  She states that she wants to work on getting back to work, as well as getting back into playing music.  She denies signs or self-diagnosed, regrets her suicide attempt.  States she wants to work more on coping skills.  Denies any side effects from her current medications, states that she wants to get back on them because \"I know they help \".  States that she has been experiencing some diarrhea, which she reports may be due to being on the antibiotic.  Per nursing staff. Compliant with medications. Participates more in milieu. Attends groups. Interacts appropriately with peers.    Sleep: normal, improved  Appetite: improving  Medication side effects: No   ROS: all other systems are negative    Mental Status Evaluation:    Appearance:  dressed appropriately, looks stated age, overweight   Behavior:  pleasant, cooperative, calm   Speech:  normal rate and volume   Mood:  improved, less anxious, less dysphoric   Affect:  brighter   Thought Process:  organized, logical, coherent, goal directed   Associations: intact associations   Thought Content:  no overt delusions   Perceptual Disturbances: no auditory hallucinations, no visual hallucinations, does not appear responding to internal stimuli   Risk Potential: Suicidal ideation - None at present, status post suicide attempt, remorseful about suicide attempt, remorseful now, contracts for safety on the unit, would talk to staff if not feeling " safe on the unit  Homicidal ideation - None at present  Potential for aggression - No   Sensorium:  oriented to person, place, and time/date   Memory:  recent and remote memory grossly intact   Consciousness:  alert and awake   Attention/Concentration: attention span and concentration are age appropriate   Insight:  age appropriate   Judgment: improving   Gait/Station: normal gait/station, normal balance   Motor Activity: no abnormal movements     Vital signs in last 24 hours:    Temp:  [97.1 °F (36.2 °C)-97.6 °F (36.4 °C)] 97.5 °F (36.4 °C)  HR:  [65-76] 73  Resp:  [16] 16  BP: (121-143)/(70-79) 138/75    Laboratory results: I have personally reviewed all pertinent laboratory/tests results    Results from the past 24 hours:   Recent Results (from the past 24 hour(s))   Protime-INR    Collection Time: 12/03/23  5:40 AM   Result Value Ref Range    Protime 26.0 (H) 11.6 - 14.5 seconds    INR 2.35 (H) 0.84 - 1.19       Suicide/Homicide Risk Assessment:    Risk of Harm to Self:   Nursing Suicide Risk Assessment Last 24 hours: C-SSRS Risk (Since Last Contact)  Calculated C-SSRS Risk Score (Since Last Contact): No Risk Indicated  Current Specific Risk Factors include: recent suicide attempt, diagnosis of depression, feelings of guilt or self blame  Protective Factors: Protective Factors: The patient has desire to live, The patient does not want to die, The patient has hope for the future, no current suicidal ideation, ability to adapt to change, ability to make plans for the future, improved depressive symptoms, improved anxiety symptoms, compliant with mental health treatment  Based on today's assessment, Blanca presents the following risk of harm to self: low    Risk of Harm to Others:  Nursing Homicide Risk Assessment: Violence Risk to Others: Denies within past 6 months  Based on today's assessment, [place] presents the following risk of harm to others: none    The following interventions are recommended: behavioral checks  every 7 minutes, continued hospitalization on locked unit    Progress Toward Goals: improving slowly, less anxious, working on coping skills    Assessment/Plan   Principal Problem:    Schizoaffective disorder, bipolar type (HCC)  Active Problems:    Benign essential hypertension    Edema    Hypothyroidism    Overactive bladder    Sjogren's syndrome (HCC)    Medical clearance for psychiatric admission    Anxiety    Borderline personality disorder (HCC)    History of pulmonary embolism    Ingrown toenail      Recommended Treatment:     Planned medication and treatment changes:    All current active medications have been reviewed  Encourage group therapy, milieu therapy and occupational therapy  Behavioral Health checks every 7 minutes  Patient was continued on home medications. Started on probiotic due to diarrhea.   Continue current medications:    Current Facility-Administered Medications   Medication Dose Route Frequency Provider Last Rate    aluminum-magnesium hydroxide-simethicone  30 mL Oral Q4H PRN JHONATAN Fields      atoMOXetine  40 mg Oral Daily Anatoly Prajapati, DO      buPROPion  450 mg Oral Daily Anatoly Prajapati, DO      cephalexin  500 mg Oral Q6H [facility] JHONATAN Fields      cholecalciferol  1,000 Units Oral Daily Wendy Anderson, LAURANP      cloNIDine  0.1 mg Oral Q12H [facility] Anatoly Prajapati, DO      furosemide  20 mg Oral Daily LAURA FieldsNP      haloperidol lactate  5 mg Intramuscular Q4H PRN Max 4/day Virginia Mccullough, LAURANP      hydrOXYzine HCL  25 mg Oral Q6H PRN Max 4/day Virginia Mccullough, CRNP      hydrOXYzine HCL  50 mg Oral Q6H PRN Max 4/day Virginia Mccullough, LAURANP      ibuprofen  200 mg Oral Q6H PRN Virginia Mccullough, CRNP      ibuprofen  400 mg Oral Q6H PRN Virginia Mccullough, CRNP      ibuprofen  600 mg Oral Q8H PRN Virginia Mccullough, CRELKIN      levothyroxine  125 mcg Oral Early Morning Wendy Anderson, LAURANP      loperamide  2 mg Oral Q4H PRN Anatoly Rodriguez  Alo, DO      LORazepam  1 mg Intramuscular Q6H PRN Max 3/day Virginia Mccullough, CRNP      LORazepam  1 mg Oral Q6H PRN Max 3/day Virginia Mccullough, JHONATAN      losartan  100 mg Oral Daily Wendy Jennifer Anderson, CRNP      lurasidone  80 mg Oral Daily With Breakfast Anatoly Prajapati, DO      melatonin  3 mg Oral HS Anatoly Prajapati, DO      naltrexone  50 mg Oral Daily Anatoly Prajapati, DO      OXcarbazepine  300 mg Oral Q12H [facility] Anatoly Prajapati, DO      oxybutynin  5 mg Oral BID Wendy Anderson, CRNP      pantoprazole  40 mg Oral Early Morning Wendy Anderson, CRNP      polyethylene glycol  17 g Oral Daily PRN Virginia Mccullough, CRNP      risperiDONE  0.25 mg Oral Q4H PRN Max 6/day Virginai Mccullough, CRNP      risperiDONE  0.5 mg Oral Q4H PRN Max 3/day Virginia Mccullough, CRNP      risperiDONE  1 mg Oral Q2H PRN Max 3/day Virginia Mccullough, JHONATAN      saccharomyces boulardii  250 mg Oral BID Anatoly Prajapati, DO      senna-docusate sodium  1 tablet Oral Daily PRN Virginia Mccullough, CRNP      topiramate  200 mg Oral BID Anatoly Prajapati, DO      traZODone  50 mg Oral HS PRN Virginia Mccullough, LAURANP      warfarin  10 mg Oral Daily (warfarin) Wendy Anderson, JHONATAN       Risks / Benefits of Treatment:    Risks, benefits, and possible side effects of medications explained to patient and patient verbalizes understanding and agreement for treatment.    Counseling / Coordination of Care:    Total floor / unit time spent today 35 minutes. Greater than 50% of total time was spent with the patient and / or family counseling and / or coordination of care. A description of counseling / coordination of care:  Patient's progress discussed with staff in treatment team meeting.  Medications, treatment progress and treatment plan reviewed with patient.  Medication education provided to patient.  Patient's progress reviewed with case management staff.  Coping with anxiety, medical illness, ongoing anxiety, and stress  reviewed with patient.  Importance of medication and treatment compliance reviewed with patient.  Reassurance and supportive therapy provided.    Anatoly Prajapati, DO 12/03/23

## 2023-12-03 NOTE — PLAN OF CARE
Problem: Ineffective Coping  Goal: Cooperates with admission process  Description: Interventions:   - Complete admission process  Outcome: Progressing  Goal: Identifies ineffective coping skills  Outcome: Progressing  Goal: Identifies healthy coping skills  Outcome: Progressing  Goal: Demonstrates healthy coping skills  Outcome: Progressing  Goal: Participates in unit activities  Description: Interventions:  - Provide therapeutic environment   - Provide required programming   - Redirect inappropriate behaviors   Outcome: Progressing  Goal: Patient/Family participate in treatment and DC plans  Description: Interventions:  - Provide therapeutic environment  Outcome: Progressing  Goal: Patient/Family verbalizes awareness of resources  Outcome: Progressing  Goal: Understands least restrictive measures  Description: Interventions:  - Utilize least restrictive behavior  Outcome: Progressing  Goal: Free from restraint events  Description: - Utilize least restrictive measures   - Provide behavioral interventions   - Redirect inappropriate behaviors   Outcome: Progressing     Problem: Risk for Self Injury/Neglect  Goal: Treatment Goal: Remain safe during length of stay, learn and adopt new coping skills, and be free of self-injurious ideation, impulses and acts at the time of discharge  Outcome: Progressing  Goal: Verbalize thoughts and feelings  Description: Interventions:  - Assess and re-assess patient's lethality and potential for self-injury  - Engage patient in 1:1 interactions, daily, for a minimum of 15 minutes  - Encourage patient to express feelings, fears, frustrations, hopes  - Establish rapport/trust with patient   Outcome: Progressing  Goal: Refrain from harming self  Description: Interventions:  - Monitor patient closely, per order  - Develop a trusting relationship  - Supervise medication ingestion, monitor effects and side effects   Outcome: Progressing  Goal: Attend and participate in unit activities,  including therapeutic, recreational, and educational groups  Description: Interventions:  - Provide therapeutic and educational activities daily, encourage attendance and participation, and document same in the medical record  - Obtain collateral information, encourage visitation and family involvement in care   Outcome: Progressing  Goal: Recognize maladaptive responses and adopt new coping mechanisms  Outcome: Progressing  Goal: Complete daily ADLs, including personal hygiene independently, as able  Description: Interventions:  - Observe, teach, and assist patient with ADLS  - Monitor and promote a balance of rest/activity, with adequate nutrition and elimination  Outcome: Progressing     Problem: Depression  Goal: Treatment Goal: Demonstrate behavioral control of depressive symptoms, verbalize feelings of improved mood/affect, and adopt new coping skills prior to discharge  Outcome: Progressing  Goal: Verbalize thoughts and feelings  Description: Interventions:  - Assess and re-assess patient's level of risk   - Engage patient in 1:1 interactions, daily, for a minimum of 15 minutes   - Encourage patient to express feelings, fears, frustrations, hopes   Outcome: Progressing  Goal: Refrain from harming self  Description: Interventions:  - Monitor patient closely, per order   - Supervise medication ingestion, monitor effects and side effects   Outcome: Progressing  Goal: Refrain from isolation  Description: Interventions:  - Develop a trusting relationship   - Encourage socialization   Outcome: Progressing  Goal: Refrain from self-neglect  Outcome: Progressing  Goal: Attend and participate in unit activities, including therapeutic, recreational, and educational groups  Description: Interventions:  - Provide therapeutic and educational activities daily, encourage attendance and participation, and document same in the medical record   Outcome: Progressing  Goal: Complete daily ADLs, including personal hygiene  independently, as able  Description: Interventions:  - Observe, teach, and assist patient with ADLS  -  Monitor and promote a balance of rest/activity, with adequate nutrition and elimination   Outcome: Progressing     Problem: Anxiety  Goal: Anxiety is at manageable level  Description: Interventions:  - Assess and monitor patient's anxiety level.   - Monitor for signs and symptoms (heart palpitations, chest pain, shortness of breath, headaches, nausea, feeling jumpy, restlessness, irritable, apprehensive).   - Collaborate with interdisciplinary team and initiate plan and interventions as ordered.  - [place] patient to unit/surroundings  - Explain treatment plan  - Encourage participation in care  - Encourage verbalization of concerns/fears  - Identify coping mechanisms  - Assist in developing anxiety-reducing skills  - Administer/offer alternative therapies  - Limit or eliminate stimulants  Outcome: Progressing     Problem: Risk for Violence/Aggression Toward Others  Goal: Treatment Goal: Refrain from acts of violence/aggression during length of stay, and demonstrate improved impulse control at the time of discharge  Outcome: Progressing  Goal: Verbalize thoughts and feelings  Description: Interventions:  - Assess and re-assess patient's level of risk, every waking shift  - Engage patient in 1:1 interactions, daily, for a minimum of 15 minutes   - Allow patient to express feelings and frustrations in a safe and non-threatening manner   - Establish rapport/trust with patient   Outcome: Progressing  Goal: Refrain from harming others  Outcome: Progressing  Goal: Refrain from destructive acts on the environment or property  Outcome: Progressing  Goal: Control angry outbursts  Description: Interventions:  - Monitor patient closely, per order  - Ensure early verbal de-escalation  - Monitor prn medication needs  - Set reasonable/therapeutic limits, outline behavioral expectations, and consequences   - Provide a  non-threatening milieu, utilizing the least restrictive interventions   Outcome: Progressing  Goal: Attend and participate in unit activities, including therapeutic, recreational, and educational groups  Description: Interventions:  - Provide therapeutic and educational activities daily, encourage attendance and participation, and document same in the medical record   Outcome: Progressing  Goal: Identify appropriate positive anger management techniques  Description: Interventions:  - Offer anger management and coping skills groups   - Staff will provide positive feedback for appropriate anger control  Outcome: Progressing

## 2023-12-04 ENCOUNTER — PATIENT OUTREACH (OUTPATIENT)
Dept: CASE MANAGEMENT | Facility: OTHER | Age: 53
End: 2023-12-04

## 2023-12-04 PROCEDURE — 99232 SBSQ HOSP IP/OBS MODERATE 35: CPT | Performed by: STUDENT IN AN ORGANIZED HEALTH CARE EDUCATION/TRAINING PROGRAM

## 2023-12-04 RX ORDER — ERGOCALCIFEROL 1.25 MG/1
50000 CAPSULE ORAL WEEKLY
Status: COMPLETED | OUTPATIENT
Start: 2023-12-04 | End: 2024-01-22

## 2023-12-04 RX ORDER — CYANOCOBALAMIN 1000 UG/ML
1000 INJECTION, SOLUTION INTRAMUSCULAR; SUBCUTANEOUS
Status: DISCONTINUED | OUTPATIENT
Start: 2023-12-04 | End: 2024-02-01 | Stop reason: HOSPADM

## 2023-12-04 RX ORDER — HALOPERIDOL DECANOATE 50 MG/ML
50 INJECTION INTRAMUSCULAR ONCE
Status: DISCONTINUED | OUTPATIENT
Start: 2023-12-20 | End: 2023-12-06

## 2023-12-04 RX ADMIN — CYANOCOBALAMIN 1000 MCG: 1000 INJECTION INTRAMUSCULAR; SUBCUTANEOUS at 12:04

## 2023-12-04 RX ADMIN — LOSARTAN POTASSIUM 100 MG: 50 TABLET, FILM COATED ORAL at 08:42

## 2023-12-04 RX ADMIN — CLONIDINE HYDROCHLORIDE 0.1 MG: 0.1 TABLET ORAL at 21:39

## 2023-12-04 RX ADMIN — Medication 250 MG: at 17:00

## 2023-12-04 RX ADMIN — LORAZEPAM 1 MG: 2 INJECTION INTRAMUSCULAR; INTRAVENOUS at 10:07

## 2023-12-04 RX ADMIN — OXCARBAZEPINE 300 MG: 300 TABLET, FILM COATED ORAL at 08:42

## 2023-12-04 RX ADMIN — ERGOCALCIFEROL 50000 UNITS: 1.25 CAPSULE ORAL at 12:03

## 2023-12-04 RX ADMIN — BUPROPION HYDROCHLORIDE 450 MG: 300 TABLET, EXTENDED RELEASE ORAL at 08:41

## 2023-12-04 RX ADMIN — OXYBUTYNIN CHLORIDE 5 MG: 5 TABLET ORAL at 17:01

## 2023-12-04 RX ADMIN — TOPIRAMATE 200 MG: 100 TABLET, FILM COATED ORAL at 17:00

## 2023-12-04 RX ADMIN — OXCARBAZEPINE 300 MG: 300 TABLET, FILM COATED ORAL at 21:39

## 2023-12-04 RX ADMIN — NALTREXONE HYDROCHLORIDE 50 MG: 50 TABLET, FILM COATED ORAL at 08:42

## 2023-12-04 RX ADMIN — Medication 250 MG: at 08:42

## 2023-12-04 RX ADMIN — WARFARIN SODIUM 10 MG: 5 TABLET ORAL at 17:00

## 2023-12-04 RX ADMIN — CEPHALEXIN 500 MG: 500 CAPSULE ORAL at 17:01

## 2023-12-04 RX ADMIN — ATOMOXETINE 40 MG: 40 CAPSULE ORAL at 08:41

## 2023-12-04 RX ADMIN — FUROSEMIDE 20 MG: 20 TABLET ORAL at 08:42

## 2023-12-04 RX ADMIN — CEPHALEXIN 500 MG: 500 CAPSULE ORAL at 06:21

## 2023-12-04 RX ADMIN — PANTOPRAZOLE SODIUM 40 MG: 40 TABLET, DELAYED RELEASE ORAL at 06:21

## 2023-12-04 RX ADMIN — CEPHALEXIN 500 MG: 500 CAPSULE ORAL at 12:03

## 2023-12-04 RX ADMIN — OXYBUTYNIN CHLORIDE 5 MG: 5 TABLET ORAL at 08:42

## 2023-12-04 RX ADMIN — LEVOTHYROXINE SODIUM 125 MCG: 125 TABLET ORAL at 06:21

## 2023-12-04 RX ADMIN — CHOLECALCIFEROL TAB 25 MCG (1000 UNIT) 1000 UNITS: 25 TAB at 08:42

## 2023-12-04 RX ADMIN — TOPIRAMATE 200 MG: 100 TABLET, FILM COATED ORAL at 08:42

## 2023-12-04 RX ADMIN — CLONIDINE HYDROCHLORIDE 0.1 MG: 0.1 TABLET ORAL at 08:42

## 2023-12-04 RX ADMIN — CEPHALEXIN 500 MG: 500 CAPSULE ORAL at 23:39

## 2023-12-04 RX ADMIN — LURASIDONE HYDROCHLORIDE 80 MG: 80 TABLET, FILM COATED ORAL at 08:42

## 2023-12-04 RX ADMIN — MELATONIN TAB 3 MG 3 MG: 3 TAB at 21:39

## 2023-12-04 NOTE — PROGRESS NOTES
Outpatient Care Management Note:  Late entry. Received call from Ananya at  [facility]. They were aware of Blanca's most recent ER admission. Lay Rubio called them after taking the overdose. Per Lawence Nondenominational they will attempt to meet with Lay Rubio while she is inpatient for planning as Lay Rubio has made multiple attempts over the last 2 months.

## 2023-12-04 NOTE — PROGRESS NOTES
"  Progress Note - Behavioral Health   Balnca Mason 53 y.o. female MRN: [id number]  Unit/Bed#: [facility] 651-02 Encounter: [id number]       Patient was visited on unit for continuing care; chart reviewed and discussed with multidisciplinary treatment team. On approach, the patient was initially calm and superficially cooperative, but later became irritable, agitated and tearful during the interview.  The patient noted that her medication doses were reportedly decreased and she felt she needed higher dose and then her MRI was scheduled in February which made her upset; she was supposed to have a meeting on Monday before returning to her job and was overwhelmed.  She noted that she has been partially compliant with her medication and reportedly missed a couple of a.m. doses and a couple of p.m. doses recently.  The patient was not able to clarify the reason she did not contact her [facility]  or providers for med changes.  Initially she noted that she thinks her current doses as being increased are \"good enough\" and she would like to go back home for [place].  Patient's current presentation, safety concerns and inability to function safely in community which led to 3 suicide attempts/gestures over past 2 months (overdose on 9/26/2023, 11/6/2023 and most recently on 12/1/2023) reviewed with the patient and the plan for further dose adjustments and referral to [facility] discussed with the patient.  The patient became tearful, started hyperventilating and crying while ruminating on going home, missing her cat and father and was not verbally redirectable.  The patient received 1 mg IM as needed for anxiety and irritability as declined p.o. and requested IM. Denied A/VH currently. Denied SI/HI, intent or plan upon direct inquiry at this time. The patient agreed to verbalize any negative thoughts or concerns to the nursing staff, immediately.     Patient continues to be intermittently visible in the milieu and interacts with " select staff and peers. No reports of aggression or self-injurious behavior on unit. Patient accepted all offered medications and reported feeling better. No adverse effects of medications noted or reported.  The patient was restarted on Strattera 40 mg daily, Wellbutrin increased to 45 mg daily and Latuda increased to 80 mg p.o. daily upon admission on 12/1/2023; maintained on clonidine 0.1 mg BID, Trileptal 300 mg BID and Topamax 200 mg BID. SNODWEN Haldol Dec 50 mg IM is due on  12/20/23.  Trileptal level to be checked tomorrow morning. Doses to be adjusted as indicated. The patient benefits from referral to [facility] given the multiple recent hospitalizations and suicide attempts and inability to juan safe and functional in the community despite being connected to the [facility] services.       Current Mental Status Evaluation:  Appearance and attitude: appeared as stated age, casually dressed, tattooed, wearing eyeglasses, with good hygiene  Eye contact: fair  Motor Function: within normal limits, intact gait, No PMA/PMR  Gait/station: normal gait/station and normal balance  Speech: normal for rate, rhythm, volume, latency, amount  Language: No overt abnormality  Mood/affect: depressed, dysphoric, irritable / Affect was constricted, tearful, hyper-intense, mood-congruent  Thought Processes: concrete, circumstantial  Thought content: denied suicidal ideations or homicidal ideations, negative thinking, ruminating thoughts  Associations: concrete associations, perseverative  Perceptual disturbances: denies Auditory/Visual/Tactile Hallucinations  Orientation: oriented to time, person, place and to the situational context  Cognitive Function: intact  Memory: recent and remote memory grossly intact  Intellect: unable to assess  Fund of knowledge: aware of current events, aware of past history, and vocabulary average  Impulse control: impulsive at times  Insight/judgment: limited/limited    Vital signs in last 24 hours:    Temp:  [96.9  °F (36.1 °C)-97.6 °F (36.4 °C)] 97.6 °F (36.4 °C)  HR:  [71-79] 79  Resp:  [16-18] 18  BP: (119-138)/(60-76) 132/76    Laboratory results: I have personally reviewed all pertinent laboratory/tests results    Results from the past 24 hours: No results found for this or any previous visit (from the past 24 hour(s)).    Progress Toward Goals: no significant improvement    Assessment:  Principal Problem:    Schizoaffective disorder, bipolar type (HCC)  Active Problems:    Borderline personality disorder (HCC)    Benign essential hypertension    Edema    Hypothyroidism    Overactive bladder    Sjogren's syndrome (HCC)    Medical clearance for psychiatric admission    Anxiety    History of pulmonary embolism    Ingrown toenail        Plan:  - f/u [facility] recs regarding the medical problems  - Continue medication titration and treatment plan; adjust medication to optimize treatment response and as clinically indicated.     Scheduled medications:  Current Facility-Administered Medications   Medication Dose Route Frequency Provider Last Rate    aluminum-magnesium hydroxide-simethicone  30 mL Oral Q4H PRN JHONATAN Fields      atoMOXetine  40 mg Oral Daily Anatoly Prajapati DO      buPROPion  450 mg Oral Daily Anatoly Prajapati DO      cephalexin  500 mg Oral Q6H [facility] JHONATAN Fields      cloNIDine  0.1 mg Oral Q12H [facility] Anatoly Prajapati DO      [START ON 12/5/2023] cyanocobalamin  1,000 mcg Oral Daily JHONATAN Berg      cyanocobalamin  1,000 mcg Intramuscular Q30 Days JHONATAN Berg      ergocalciferol  50,000 Units Oral Weekly JHONATAN Berg      furosemide  20 mg Oral Daily JHONATAN Fields      [START ON 12/20/2023]  haloperidol decanoate  50 mg Intramuscular - haloperidol decanoate Once Frederick Smith MD      haloperidol lactate  5 mg Intramuscular Q4H PRN Max 4/day JHONATAN Anne      hydrOXYzine HCL  25 mg Oral Q6H PRN Max 4/day [place]  Jamel, CRNP      hydrOXYzine HCL  50 mg Oral Q6H PRN Max 4/day Virginia Mccullough, CRNP      ibuprofen  200 mg Oral Q6H PRN Virginia Mccullough, CRNP      ibuprofen  400 mg Oral Q6H PRN Virginia Mccullough, CRNP      ibuprofen  600 mg Oral Q8H PRN Virginia Mccullough, CRNP      levothyroxine  125 mcg Oral Early Morning Wendy Anderson, CRNP      loperamide  2 mg Oral Q4H PRN Anatoly Prajapati, DO      LORazepam  1 mg Intramuscular Q6H PRN Max 3/day Virginia Mccullough, CRNP      LORazepam  1 mg Oral Q6H PRN Max 3/day Virginia Mccullough, CRNP      losartan  100 mg Oral Daily Wendy Anderson, CRNP      lurasidone  80 mg Oral Daily With Breakfast Anatoly Prajapati, DO      melatonin  3 mg Oral HS Anatoly Prajapati, DO      naltrexone  50 mg Oral Daily Anatoly Prajapati, DO      OXcarbazepine  300 mg Oral Q12H [facility] Anatoly Prajapati, DO      oxybutynin  5 mg Oral BID Wendy Anderson, CRNP      pantoprazole  40 mg Oral Early Morning Wendy Anderson, CRNP      polyethylene glycol  17 g Oral Daily PRN Virginia Mccullough, CRNP      risperiDONE  0.25 mg Oral Q4H PRN Max 6/day Virginia Mccullough, CRNP      risperiDONE  0.5 mg Oral Q4H PRN Max 3/day Virginia Mccullough, CRNP      risperiDONE  1 mg Oral Q2H PRN Max 3/day Virginia Mccullough, CRNP      saccharomyces boulardii  250 mg Oral BID Anatoly Prajapati, DO      senna-docusate sodium  1 tablet Oral Daily PRN Virginia Mccullough, CRNP      topiramate  200 mg Oral BID Anatoly Prajapati, DO      traZODone  50 mg Oral HS PRN Virginia Mccullough, CRNP      warfarin  10 mg Oral Daily (warfarin) Wendy Anderson, JHONATAN          PRN:    aluminum-magnesium hydroxide-simethicone    haloperidol lactate    hydrOXYzine HCL    hydrOXYzine HCL    ibuprofen    ibuprofen    ibuprofen    loperamide    LORazepam    LORazepam    polyethylene glycol    risperiDONE    risperiDONE    risperiDONE    senna-docusate sodium    traZODone    - Observation: routine    - VS: as per unit protocol  - Diet:  Regular diet  - Psychoeducation (benefits and potential risks) discussed, importance of compliance with the psychiatric treatment reiterated, and the patient verbalized understanding of the matter  - Encourage group attendance and milieu therapy    - The pt was educated and agreed to verbalize any suicidal thoughts, frustrations or concerns to the nursing staff, immediately.    - Dispo: TBD       Next of Kin  Extended Emergency Contact Information  Primary Emergency Contact: Luigi Mason  Address: [address] [place] [address] [facility]  Home Phone: [phone]  Mobile Phone: [phone]  Relation: Father  Secondary Emergency Contact: Leonor Ji  Address: [address] [place] [address] [facility]  Home Phone: [phone]  Mobile Phone: [phone]  Relation: Friend      Counseling / Coordination of Care  Patient's progress discussed with staff in treatment team meeting.  Medications, treatment progress and treatment plan reviewed with patient.  Medication education provided to patient.  Coping skills reviewed with patient.  Cognitive techniques utilized during the session.  Reassurance and supportive therapy provided.  Reoriented to reality and reassured.  Encouraged participation in milieu and group therapy on the unit.  Crisis/safety plan discussed with patient.  Discharge plan discussed with patient.     Frederick Smith MD  Attending Psychiatrist   [facility]

## 2023-12-04 NOTE — PLAN OF CARE
Problem: Ineffective Coping  Goal: Cooperates with admission process  Description: Interventions:   - Complete admission process  Outcome: Progressing  Goal: Identifies ineffective coping skills  Outcome: Progressing  Goal: Identifies healthy coping skills  Outcome: Progressing  Goal: Demonstrates healthy coping skills  Outcome: Progressing  Goal: Participates in unit activities  Description: Interventions:  - Provide therapeutic environment   - Provide required programming   - Redirect inappropriate behaviors   Outcome: Progressing  Goal: Patient/Family participate in treatment and DC plans  Description: Interventions:  - Provide therapeutic environment  Outcome: Progressing  Goal: Patient/Family verbalizes awareness of resources  Outcome: Progressing  Goal: Understands least restrictive measures  Description: Interventions:  - Utilize least restrictive behavior  Outcome: Progressing  Goal: Free from restraint events  Description: - Utilize least restrictive measures   - Provide behavioral interventions   - Redirect inappropriate behaviors   Outcome: Progressing     Problem: Risk for Self Injury/Neglect  Goal: Treatment Goal: Remain safe during length of stay, learn and adopt new coping skills, and be free of self-injurious ideation, impulses and acts at the time of discharge  Outcome: Progressing  Goal: Verbalize thoughts and feelings  Description: Interventions:  - Assess and re-assess patient's lethality and potential for self-injury  - Engage patient in 1:1 interactions, daily, for a minimum of 15 minutes  - Encourage patient to express feelings, fears, frustrations, hopes  - Establish rapport/trust with patient   Outcome: Progressing  Goal: Refrain from harming self  Description: Interventions:  - Monitor patient closely, per order  - Develop a trusting relationship  - Supervise medication ingestion, monitor effects and side effects   Outcome: Progressing  Goal: Attend and participate in unit activities,  including therapeutic, recreational, and educational groups  Description: Interventions:  - Provide therapeutic and educational activities daily, encourage attendance and participation, and document same in the medical record  - Obtain collateral information, encourage visitation and family involvement in care   Outcome: Progressing  Goal: Recognize maladaptive responses and adopt new coping mechanisms  Outcome: Progressing  Goal: Complete daily ADLs, including personal hygiene independently, as able  Description: Interventions:  - Observe, teach, and assist patient with ADLS  - Monitor and promote a balance of rest/activity, with adequate nutrition and elimination  Outcome: Progressing     Problem: Depression  Goal: Treatment Goal: Demonstrate behavioral control of depressive symptoms, verbalize feelings of improved mood/affect, and adopt new coping skills prior to discharge  Outcome: Progressing  Goal: Verbalize thoughts and feelings  Description: Interventions:  - Assess and re-assess patient's level of risk   - Engage patient in 1:1 interactions, daily, for a minimum of 15 minutes   - Encourage patient to express feelings, fears, frustrations, hopes   Outcome: Progressing  Goal: Refrain from harming self  Description: Interventions:  - Monitor patient closely, per order   - Supervise medication ingestion, monitor effects and side effects   Outcome: Progressing  Goal: Refrain from isolation  Description: Interventions:  - Develop a trusting relationship   - Encourage socialization   Outcome: Progressing  Goal: Refrain from self-neglect  Outcome: Progressing  Goal: Attend and participate in unit activities, including therapeutic, recreational, and educational groups  Description: Interventions:  - Provide therapeutic and educational activities daily, encourage attendance and participation, and document same in the medical record   Outcome: Progressing  Goal: Complete daily ADLs, including personal hygiene  independently, as able  Description: Interventions:  - Observe, teach, and assist patient with ADLS  -  Monitor and promote a balance of rest/activity, with adequate nutrition and elimination   Outcome: Progressing     Problem: Anxiety  Goal: Anxiety is at manageable level  Description: Interventions:  - Assess and monitor patient's anxiety level.   - Monitor for signs and symptoms (heart palpitations, chest pain, shortness of breath, headaches, nausea, feeling jumpy, restlessness, irritable, apprehensive).   - Collaborate with interdisciplinary team and initiate plan and interventions as ordered.  - [place] patient to unit/surroundings  - Explain treatment plan  - Encourage participation in care  - Encourage verbalization of concerns/fears  - Identify coping mechanisms  - Assist in developing anxiety-reducing skills  - Administer/offer alternative therapies  - Limit or eliminate stimulants  Outcome: Progressing     Problem: Risk for Violence/Aggression Toward Others  Goal: Treatment Goal: Refrain from acts of violence/aggression during length of stay, and demonstrate improved impulse control at the time of discharge  Outcome: Progressing  Goal: Verbalize thoughts and feelings  Description: Interventions:  - Assess and re-assess patient's level of risk, every waking shift  - Engage patient in 1:1 interactions, daily, for a minimum of 15 minutes   - Allow patient to express feelings and frustrations in a safe and non-threatening manner   - Establish rapport/trust with patient   Outcome: Progressing  Goal: Refrain from harming others  Outcome: Progressing  Goal: Refrain from destructive acts on the environment or property  Outcome: Progressing  Goal: Control angry outbursts  Description: Interventions:  - Monitor patient closely, per order  - Ensure early verbal de-escalation  - Monitor prn medication needs  - Set reasonable/therapeutic limits, outline behavioral expectations, and consequences   - Provide a  non-threatening milieu, utilizing the least restrictive interventions   Outcome: Progressing  Goal: Attend and participate in unit activities, including therapeutic, recreational, and educational groups  Description: Interventions:  - Provide therapeutic and educational activities daily, encourage attendance and participation, and document same in the medical record   Outcome: Progressing  Goal: Identify appropriate positive anger management techniques  Description: Interventions:  - Offer anger management and coping skills groups   - Staff will provide positive feedback for appropriate anger control  Outcome: Progressing

## 2023-12-04 NOTE — PROGRESS NOTES
12/04/23 1556   Team Meeting   Meeting Type Tx Team Meeting   Initial Conference Date 12/04/23   Next Conference Date 01/03/24   Team Members Present   Team Members Present Physician;;Nurse   Physician Team Member Dr Smith   Nursing Team Member [place]   Care Management Team Member Atiya   Patient/Family Present   Patient Present Yes   Patient's Family Present No     Met with the patient to go over their treatment plan. Goals discussed were to have a decrease in depressive symptoms, decrease in suicidal thoughts, improvement in self care. Goals can be attained through medication management and group/milieu therapy.     Patient agrees with their treatment plan.

## 2023-12-04 NOTE — CASE MANAGEMENT
Etta from  ACT called, she said that she wanted to have a meeting with Blanca, she does not think that ACT is working for her at this time. She will call the ACT team and get counseled and says she is feeling better, and then a little while later will call them back and state she took pills and tried to commit suicide. She advised that she could do this same behavior without ACT, she is not sure what they are doing for her at this point.     Did advise that we are looking into doing an EAC referral since this is the patient's third suicide attempt in a little over two months.     Attempted to call patient's father, Luigi ([phone]), to advise of patients admission as well as the possible referral to EAC. No answer, left voicemail.

## 2023-12-04 NOTE — NURSING NOTE
"Pt becomes upset while speaking to physician regarding current TX plan. Pt tearful and trembling. Expresses concern about her cat, and leaving her father to be alone on donal; as her Mad passed away approx 2 yrs ago. Pt feels like her Dad will be \"so disappointed in me.\" Staff reassurance and encouragement provided. PO Ativan 1mg IM administered for a minaya of 21. Will monitor for effectiveness.   "

## 2023-12-04 NOTE — PROGRESS NOTES
"   12/04/23 0749   Team Meeting   Meeting Type Daily Rounds   Initial Conference Date 12/04/23   Next Conference Date 12/05/23   Team Members Present   Team Members Present Physician;Nurse;;;Occupational Therapist   Physician Team Member Dr Smith, Dr Masters, SHEFALI Lovell   Nursing Team Member Aram   Care Management Team Member Atiya   Social Work Team Member Dalia TALBOT Team Member Yeison   Patient/Family Present   Patient Present No   Patient's Family Present No     201, re-admit previously discharged on 11/17, intentional OD of 21 trazodone, reports that her meds were \"messed up\" from previous admission, hopelessness, haldol dec will be maximized.  "
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
## 2023-12-04 NOTE — NURSING NOTE
Patient calm and cooperative. Patient denies psych s/s. Patient seen crying at one point but refused to tell writer what was bothering her. Appetite intact. VSS. Continual safety checks ongoing

## 2023-12-04 NOTE — CASE MANAGEMENT
Patient has [facility]- ACT services in the community. They are aware of pt's admission and are seeking to be involved in her treatment. They can be contacted at [phone].

## 2023-12-04 NOTE — PLAN OF CARE
Problem: Ineffective Coping  Goal: Participates in unit activities  Description: Interventions:  - Provide therapeutic environment   - Provide required programming   - Redirect inappropriate behaviors   Outcome: Progressing

## 2023-12-04 NOTE — NURSING NOTE
PRN Ativan effective. Pt describes feeling more relaxed and accepting of current/future TX plan; just has many questions for physician and  regarding EAC. This writer offers information, but encourages Pt to make a list in order to gain information at next meeting.  Pt was able to join the group on the unit and remained controlled in mood and behavior. Will cont to offer support as needed.

## 2023-12-04 NOTE — TREATMENT TEAM
"Pt was able to complete admission self assessment.  Pt signed and copy in chart.  Pt known from previous admissions.  Pt indicated biggest stressor \"meds and depression\".  Pt at d.c will be able to: \"take meds correctly\".  Pt attends groups.     12/04/23 1100   Activity/Group Checklist   Group Admission/Discharge   Attendance Attended   Attendance Duration (min) 16-30   Interactions Interacted appropriately   Affect/Mood Appropriate   Goals Achieved Identified feelings;Discussed coping strategies;Identified relapse prevention strategies;Able to listen to others;Able to engage in interactions;Identified resources and support systems;Able to reflect/comment on own behavior;Able to self-disclose;Verbalized increased hopefulness;Able to manage/cope with feelings        "

## 2023-12-04 NOTE — PROGRESS NOTES
Pt attended all groups.  Tearful in the am and blunt affect in afternoon.  Pt needed reminders of boundaries and unit rules.  (No hugs and not sharing personal contact details with other patients)  Another patient was offering to help her father when patient was sobbing/sharing her story to them.    Pt able to provide coping skills and self care needs.     12/04/23 1300   Activity/Group Checklist   Group Other (Comment)  (coping skills and self care)   Attendance Attended   Attendance Duration (min) 46-60   Interactions Interacted appropriately   Affect/Mood Blunted/flat   Goals Achieved Identified feelings;Discussed coping strategies;Able to listen to others;Able to engage in interactions;Able to self-disclose;Able to recieve feedback

## 2023-12-04 NOTE — PROGRESS NOTES
12/04/23 1534   Admission   Release of Information Signed Yes  ([facility]; [facility]; Luigi Mason (father) [phone])

## 2023-12-04 NOTE — PROGRESS NOTES
12/04/23 1527   Referral Data   Referral Reason Psych   County Information   County of Residence [place]   Readmission Root Cause   30 Day Readmission Yes   Who directed you to return to the hospital? Self   Did you understand whom to contact if you had questions or problems? Yes   Did you get your prescriptions before you left the hospital? No   Reason: Not preferred pharmacy   Were you able to get your prescriptions filled when you left the hospital? Yes   Did you take your medications as prescribed? No  (Patient reports that the ACT team changed some of her meds and then the pharmacy did not have the meds so she went without them for a little while.)   Were you able to get to your follow-up appointments? Yes   During previous admission, was a post-acute recommendation made? No   Patient was readmitted due to Patient got news about an MRI being pushed back 6 months and she felt hopelessness and OD'd on pills.   Action Plan EAC referral   Patient Information   Mental Status Alert   Primary Caregiver Self   Support System Immediate family   Taoist/Cultural Requests none   Legal Information   Tx Plan Signed Yes   Current Status: 201   Legal Issues denies   Health Care Proxy Appointed Yes - See Health Care Proxy section   Activities of Daily Living Prior to Admission   Functional Status Independent   Assistive Device No device needed   Living Arrangement Lives alone   Ambulation Independent   Access to Firearms   Access to Firearms No   Income Information   Income Source SSI/SSD  (part time employment and SSI)   Means of Transportation   Means of Transport to Appts: Drives Self

## 2023-12-04 NOTE — PROGRESS NOTES
Outpatient Care Management Note:  In basket admission alert received. Chart review completed. [facility] is aware of the admission per phone call on 12/1/2023. Transitions:   Type: Unplanned  Provider notification within 2 days of discharge  PCP: Rosalee Howe  Notified via: ADT alert  Specialty Provider: [facility]  Notified via: Telephonic  Other Care Team Member: RAIN HERNANDEZ  Notified via: ADT alert  Collaboration with discharge team: reviewed HP IP/OP [facility] and reviewed Case Management notes  Communication of changes to care plans to patient/caregiver: Will review with Mert Fontenot after discharge. Voice mail left making Mert Fontenot aware we are available if needed.

## 2023-12-05 LAB
ATRIAL RATE: 72 BPM
P AXIS: 50 DEGREES
PR INTERVAL: 194 MS
QRS AXIS: -18 DEGREES
QRSD INTERVAL: 94 MS
QT INTERVAL: 374 MS
QTC INTERVAL: 409 MS
T WAVE AXIS: 8 DEGREES
VENTRICULAR RATE: 72 BPM

## 2023-12-05 PROCEDURE — 93010 ELECTROCARDIOGRAM REPORT: CPT | Performed by: INTERNAL MEDICINE

## 2023-12-05 PROCEDURE — 86480 TB TEST CELL IMMUN MEASURE: CPT

## 2023-12-05 PROCEDURE — 93005 ELECTROCARDIOGRAM TRACING: CPT

## 2023-12-05 PROCEDURE — 99232 SBSQ HOSP IP/OBS MODERATE 35: CPT | Performed by: STUDENT IN AN ORGANIZED HEALTH CARE EDUCATION/TRAINING PROGRAM

## 2023-12-05 PROCEDURE — 80183 DRUG SCRN QUANT OXCARBAZEPIN: CPT | Performed by: STUDENT IN AN ORGANIZED HEALTH CARE EDUCATION/TRAINING PROGRAM

## 2023-12-05 RX ORDER — LANOLIN ALCOHOL/MO/W.PET/CERES
3 CREAM (GRAM) TOPICAL
Status: DISCONTINUED | OUTPATIENT
Start: 2023-12-05 | End: 2024-02-01 | Stop reason: HOSPADM

## 2023-12-05 RX ORDER — LURASIDONE HYDROCHLORIDE 80 MG/1
80 TABLET, FILM COATED ORAL
Status: DISCONTINUED | OUTPATIENT
Start: 2023-12-06 | End: 2023-12-07

## 2023-12-05 RX ADMIN — LURASIDONE HYDROCHLORIDE 80 MG: 80 TABLET, FILM COATED ORAL at 08:31

## 2023-12-05 RX ADMIN — CEPHALEXIN 500 MG: 500 CAPSULE ORAL at 12:29

## 2023-12-05 RX ADMIN — OXCARBAZEPINE 300 MG: 300 TABLET, FILM COATED ORAL at 08:32

## 2023-12-05 RX ADMIN — CEPHALEXIN 500 MG: 500 CAPSULE ORAL at 17:43

## 2023-12-05 RX ADMIN — LORAZEPAM 1 MG: 2 INJECTION INTRAMUSCULAR; INTRAVENOUS at 16:39

## 2023-12-05 RX ADMIN — CEPHALEXIN 500 MG: 500 CAPSULE ORAL at 06:15

## 2023-12-05 RX ADMIN — LEVOTHYROXINE SODIUM 125 MCG: 125 TABLET ORAL at 06:15

## 2023-12-05 RX ADMIN — OXYBUTYNIN CHLORIDE 5 MG: 5 TABLET ORAL at 17:43

## 2023-12-05 RX ADMIN — NALTREXONE HYDROCHLORIDE 50 MG: 50 TABLET, FILM COATED ORAL at 08:31

## 2023-12-05 RX ADMIN — Medication 250 MG: at 08:32

## 2023-12-05 RX ADMIN — OXYBUTYNIN CHLORIDE 5 MG: 5 TABLET ORAL at 08:36

## 2023-12-05 RX ADMIN — TOPIRAMATE 200 MG: 100 TABLET, FILM COATED ORAL at 08:32

## 2023-12-05 RX ADMIN — WARFARIN SODIUM 10 MG: 5 TABLET ORAL at 17:42

## 2023-12-05 RX ADMIN — CLONIDINE HYDROCHLORIDE 0.1 MG: 0.1 TABLET ORAL at 08:32

## 2023-12-05 RX ADMIN — BUPROPION HYDROCHLORIDE 450 MG: 300 TABLET, EXTENDED RELEASE ORAL at 08:31

## 2023-12-05 RX ADMIN — Medication 250 MG: at 17:43

## 2023-12-05 RX ADMIN — CYANOCOBALAMIN TAB 1000 MCG 1000 MCG: 1000 TAB at 08:31

## 2023-12-05 RX ADMIN — FUROSEMIDE 20 MG: 20 TABLET ORAL at 08:32

## 2023-12-05 RX ADMIN — MELATONIN TAB 3 MG 3 MG: 3 TAB at 22:02

## 2023-12-05 RX ADMIN — PANTOPRAZOLE SODIUM 40 MG: 40 TABLET, DELAYED RELEASE ORAL at 06:15

## 2023-12-05 RX ADMIN — TOPIRAMATE 200 MG: 100 TABLET, FILM COATED ORAL at 17:42

## 2023-12-05 RX ADMIN — LOSARTAN POTASSIUM 100 MG: 50 TABLET, FILM COATED ORAL at 08:31

## 2023-12-05 RX ADMIN — CLONIDINE HYDROCHLORIDE 0.1 MG: 0.1 TABLET ORAL at 22:02

## 2023-12-05 RX ADMIN — ATOMOXETINE 40 MG: 40 CAPSULE ORAL at 08:31

## 2023-12-05 RX ADMIN — OXCARBAZEPINE 300 MG: 300 TABLET, FILM COATED ORAL at 22:02
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
## 2023-12-05 NOTE — NURSING NOTE
"Patient stomped out of dayroom, went to room and slammed door shut. Writer went to patients room to assess the situation with another nurse. Patient was yelling and crying \"everybody got their meds and can go to sleep except me\". Patient informed med pass had just started and she will be getting her meds soon as well as her CPAP. Patient continued to slam bathroom door once more. Patient told to use her words instead. Once writer returned to give meds and CPAP, patient refused to look or turn at writer. Patient was shaking her leg and arms while lying in bed. After multiple attemtps to speak with patient and give meds, writer told patient CPAP was ready for her and I would [place] meds as refused.   "

## 2023-12-05 NOTE — NURSING NOTE
Pt is cooperative with care and interacts with peers. Pt is Medication and meals complaint this shift . Pt did report of chest pain Slim was made aware and EKG was done ans sent over. Pt  stated she is feeling better . Pt offers no concerns at this time.

## 2023-12-05 NOTE — PROGRESS NOTES
12/05/23 1330   Activity/Group Checklist   Group Pet therapy   Attendance Attended   Attendance Duration (min) 16-30   Interactions Interacted appropriately   Affect/Mood Appropriate;Bright;Normal range   Goals Achieved Able to engage in interactions

## 2023-12-05 NOTE — PROGRESS NOTES
"  Progress Note - Behavioral Health   Blanca Maosn 53 y.o. female MRN: [id number]  Unit/Bed#: [facility] 651-02 Encounter: [id number]       Patient was visited on unit for continuing care; chart reviewed and discussed with multidisciplinary treatment team. On approach, the patient was calmer and more cooperative, tearful at times during the interview.  The patient was preoccupied about going to the [facility] and the possibility of getting discharged by February.  She noted that she developed neck pain after a chiropractic session in May and her MRI is a scheduled in February.  The patient was initially minimizing all symptoms but when she was confronted about her anger outburst last night, she noted that she was waiting for her medication and also her CPAP and was upset as the nurse did not give them to her right away.  She admitted having \"anger issues\" and noted that she is willing to consider \"anger management\".  Denied any changes in appetite, and energy level. No problem initiating and maintaining sleep. Denied A/VH currently. Denied SI/HI, intent or plan upon direct inquiry at this time.    Patient continues to be visible in the milieu and interacts with staff and peers.continues to have poor frustration tolerance with episodes of irritability and outbursts when her needs not met immediately.  No PRN medications used in the past 24 hours since received Ativan 1 mg IM for irritability yesterday morning.    Patient accepted all offered medications and reported feeling better. No adverse effects of medications noted or reported.  The patient noted that she usually has supper as the main course and mostly skips her breakfast.  The timing of Latuda switched from daily with breakfast to daily with dinner; doses recently increased to 80 mg daily on 12/1/2023 and will be uptitrated as indicated.  SNOWDEN Haldol Dec 50 mg IM is due on 12/20/2023.  Pending Trileptal level.  The patient benefits from referral to [facility] given the " multiple recent hospitalizations and suicide attempts and inability to stay safe and functional in the community despite being connected to the ACT services.    Current Mental Status Evaluation:  Appearance and attitude: appeared as stated age, dressed in hospital attire, tattooed, wearing eyeglasses, with good hygiene  Eye contact: poor  Motor Function: within normal limits, intact gait, No PMA/PMR  Gait/station: normal gait/station and normal balance  Speech: talking in soft tone, with normal latency and amount  Language: No overt abnormality  Mood/affect: less dysphoric, less irritable / Affect was constricted, tearful, mood-congruent  Thought Processes: perseverative  Thought content: denied suicidal ideations or homicidal ideations, no overt delusions elicited, ruminating thoughts  Associations: concrete associations, perseverative  Perceptual disturbances: denies Auditory/Visual/Tactile Hallucinations  Orientation: oriented to time, person, place and to the situational context  Cognitive Function: intact  Memory: recent and remote memory grossly intact  Intellect: unable to assess  Fund of knowledge: aware of current events, aware of past history, and vocabulary average  Impulse control: impulsive at times  Insight/judgment: limited/limited    Pain: reported having pain in neck  Pain scale: 5      Vital signs in last 24 hours:    Temp:  [97.2 °F (36.2 °C)-97.5 °F (36.4 °C)] 97.2 °F (36.2 °C)  HR:  [74-76] 76  Resp:  [18-20] 20  BP: (114-123)/(68-73) 123/72    Laboratory results: I have personally reviewed all pertinent laboratory/tests results    Results from the past 24 hours: No results found for this or any previous visit (from the past 24 hour(s)).    Progress Toward Goals: slight improvement    Assessment:  Principal Problem:    Schizoaffective disorder, bipolar type (HCC)  Active Problems:    Borderline personality disorder (HCC)    Benign essential hypertension    Edema    Hypothyroidism    MAG  (obstructive sleep apnea)    Overactive bladder    Sjogren's syndrome (HCC)    Medical clearance for psychiatric admission    Anxiety    History of pulmonary embolism    Ingrown toenail        Plan:  - f/u [facility] recs regarding the medical problems  - Continue medication titration and treatment plan; adjust medication to optimize treatment response and as clinically indicated.     Scheduled medications:  Current Facility-Administered Medications   Medication Dose Route Frequency Provider Last Rate    aluminum-magnesium hydroxide-simethicone  30 mL Oral Q4H PRN JHONATAN Fields      atoMOXetine  40 mg Oral Daily Anatoly Prajapati, DO      buPROPion  450 mg Oral Daily Anatoly Prajapati, DO      cephalexin  500 mg Oral Q6H [facility] JHONATAN Fields      cloNIDine  0.1 mg Oral Q12H [facility] Anatoly Prajapati,       cyanocobalamin  1,000 mcg Oral Daily JHONATAN Berg      cyanocobalamin  1,000 mcg Intramuscular Q30 Days JHONATAN Berg      ergocalciferol  50,000 Units Oral Weekly JHONATAN Berg      furosemide  20 mg Oral Daily JHONATAN Fields      [START ON 12/20/2023]  haloperidol decanoate  50 mg Intramuscular - haloperidol decanoate Once Frederick Smith MD      haloperidol lactate  5 mg Intramuscular Q4H PRN Max 4/day JHONATAN Anne      hydrOXYzine HCL  25 mg Oral Q6H PRN Max 4/day Virginia Mccullough, JHONATAN      hydrOXYzine HCL  50 mg Oral Q6H PRN Max 4/day Virginia Mccullough, JHONATAN      ibuprofen  200 mg Oral Q6H PRN Virginia Mccullough, CRNP      ibuprofen  400 mg Oral Q6H PRN Virginia Mccullough, CRNP      ibuprofen  600 mg Oral Q8H PRN Virginia Mccullough, JHONATAN      levothyroxine  125 mcg Oral Early Morning JHONATAN Fields      loperamide  2 mg Oral Q4H PRN Anatoly Prajapati,       LORazepam  1 mg Intramuscular Q6H PRN Max 3/day JHONATAN Anne      LORazepam  1 mg Oral Q6H PRN Max 3/day JHONATAN Anne      losartan  100 mg Oral Daily  JHONATAN Fields      [START ON 12/6/2023] lurasidone  80 mg Oral Daily With Dinner Frederick Smith MD      melatonin  3 mg Oral HS Frederick Smith MD      naltrexone  50 mg Oral Daily Anatoly Prajapati, DO      OXcarbazepine  300 mg Oral Q12H [facility] Anatoly Prajapati, DO      oxybutynin  5 mg Oral BID JHONATAN Fields      pantoprazole  40 mg Oral Early Morning LAURA FieldsNP      polyethylene glycol  17 g Oral Daily PRN Virginia Mccullough, JHONATAN      risperiDONE  0.25 mg Oral Q4H PRN Max 6/day Virginia Mccullough, LAURANP      risperiDONE  0.5 mg Oral Q4H PRN Max 3/day Virginia Mccullough, LAURANP      risperiDONE  1 mg Oral Q2H PRN Max 3/day Virginia Mccullough, JHONATAN      saccharomyces boulardii  250 mg Oral BID Anatoly Prajapati, DO      senna-docusate sodium  1 tablet Oral Daily PRN JHONATAN Anne      topiramate  200 mg Oral BID Anatoly Prajapati, DO      traZODone  50 mg Oral HS PRN JHONATAN Anne      warfarin  10 mg Oral Daily (warfarin) JHONATAN Fields          PRN:    aluminum-magnesium hydroxide-simethicone    haloperidol lactate    hydrOXYzine HCL    hydrOXYzine HCL    ibuprofen    ibuprofen    ibuprofen    loperamide    LORazepam    LORazepam    polyethylene glycol    risperiDONE    risperiDONE    risperiDONE    senna-docusate sodium    traZODone    - Observation: routine    - VS: as per unit protocol  - Diet: Regular diet  - Psychoeducation (benefits and potential risks) discussed, importance of compliance with the psychiatric treatment reiterated, and the patient verbalized understanding of the matter  - Encourage group attendance and milieu therapy    - The pt was educated and agreed to verbalize any suicidal thoughts, frustrations or concerns to the nursing staff, immediately.    - Dispo: TBD       Next of Kin  Extended Emergency Contact Information  Primary Emergency Contact: Luigi Mason  Address: [address] [place] [id number]  [place]  Home Phone: [phone]  Mobile Phone: [phone]  Relation: Father  Secondary Emergency Contact: Leonor Ji  Address: [address] [place] [address] [facility] [place]  Home Phone: [phone]  Mobile Phone: [phone]  Relation: Friend      Counseling / Coordination of Care  Patient's progress discussed with staff in treatment team meeting.  Medications, treatment progress and treatment plan reviewed with patient.  Medication changes discussed with patient.  Coping with stress reviewed with patient.  Coping skills and anger management techniques reviewed with patient.  Supportive therapy provided to patient.  Cognitive techniques utilized during the session.  Reassurance and supportive therapy provided.  Reoriented to reality and reassured.  Encouraged participation in milieu and group therapy on the unit.  Crisis/safety plan discussed with patient.     Frederick Smith MD  Attending Psychiatrist   [facility]
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
## 2023-12-05 NOTE — PROGRESS NOTES
12/05/23 0806   Team Meeting   Meeting Type Daily Rounds   Initial Conference Date 12/05/23   Next Conference Date 12/06/23   Team Members Present   Team Members Present Physician;Nurse;;;Occupational Therapist   Physician Team Member SHEFALI Rodriguez M. Noonan   Nursing Team Member Jennifer   Care Management Team Member Atiya   Social Work Team Member Dalia   OT Team Member Yeison   Patient/Family Present   Patient Present No   Patient's Family Present No     Ruminating on [facility], stomped out of day room and slamming doors and throwing things, yelling and crying about meds and timing, refused meds after her outburst, IM yesterday, admitted to being non-compliant with meds after last discharge, asking [facility] if the MRI can get done here.

## 2023-12-05 NOTE — CASE MANAGEMENT
[facility] referral and request for a [facility] meeting was sent to Norberto Millan and Moi Silva with [facility] via email. [facility] was contacted in [place] as well and spoke to Cynthia.     Gail Marino was also Cc'd on the email.

## 2023-12-05 NOTE — PLAN OF CARE
Problem: Ineffective Coping  Goal: Cooperates with admission process  Description: Interventions:   - Complete admission process  Outcome: Progressing  Goal: Identifies healthy coping skills  Outcome: Progressing  Goal: Participates in unit activities  Description: Interventions:  - Provide therapeutic environment   - Provide required programming   - Redirect inappropriate behaviors   Outcome: Progressing  Goal: Patient/Family participate in treatment and DC plans  Description: Interventions:  - Provide therapeutic environment  Outcome: Progressing  Goal: Patient/Family verbalizes awareness of resources  Outcome: Progressing  Goal: Understands least restrictive measures  Description: Interventions:  - Utilize least restrictive behavior  Outcome: Progressing  Goal: Free from restraint events  Description: - Utilize least restrictive measures   - Provide behavioral interventions   - Redirect inappropriate behaviors   Outcome: Progressing     Problem: Risk for Self Injury/Neglect  Goal: Treatment Goal: Remain safe during length of stay, learn and adopt new coping skills, and be free of self-injurious ideation, impulses and acts at the time of discharge  Outcome: Progressing  Goal: Verbalize thoughts and feelings  Description: Interventions:  - Assess and re-assess patient's lethality and potential for self-injury  - Engage patient in 1:1 interactions, daily, for a minimum of 15 minutes  - Encourage patient to express feelings, fears, frustrations, hopes  - Establish rapport/trust with patient   Outcome: Progressing  Goal: Refrain from harming self  Description: Interventions:  - Monitor patient closely, per order  - Develop a trusting relationship  - Supervise medication ingestion, monitor effects and side effects   Outcome: Progressing  Goal: Attend and participate in unit activities, including therapeutic, recreational, and educational groups  Description: Interventions:  - Provide therapeutic and educational  activities daily, encourage attendance and participation, and document same in the medical record  - Obtain collateral information, encourage visitation and family involvement in care   Outcome: Progressing  Goal: Recognize maladaptive responses and adopt new coping mechanisms  Outcome: Progressing  Goal: Complete daily ADLs, including personal hygiene independently, as able  Description: Interventions:  - Observe, teach, and assist patient with ADLS  - Monitor and promote a balance of rest/activity, with adequate nutrition and elimination  Outcome: Progressing     Problem: Depression  Goal: Treatment Goal: Demonstrate behavioral control of depressive symptoms, verbalize feelings of improved mood/affect, and adopt new coping skills prior to discharge  Outcome: Progressing  Goal: Verbalize thoughts and feelings  Description: Interventions:  - Assess and re-assess patient's level of risk   - Engage patient in 1:1 interactions, daily, for a minimum of 15 minutes   - Encourage patient to express feelings, fears, frustrations, hopes   Outcome: Progressing  Goal: Refrain from harming self  Description: Interventions:  - Monitor patient closely, per order   - Supervise medication ingestion, monitor effects and side effects   Outcome: Progressing  Goal: Refrain from isolation  Description: Interventions:  - Develop a trusting relationship   - Encourage socialization   Outcome: Progressing  Goal: Refrain from self-neglect  Outcome: Progressing  Goal: Attend and participate in unit activities, including therapeutic, recreational, and educational groups  Description: Interventions:  - Provide therapeutic and educational activities daily, encourage attendance and participation, and document same in the medical record   Outcome: Progressing  Goal: Complete daily ADLs, including personal hygiene independently, as able  Description: Interventions:  - Observe, teach, and assist patient with ADLS  -  Monitor and promote a balance of  rest/activity, with adequate nutrition and elimination   Outcome: Progressing     Problem: Anxiety  Goal: Anxiety is at manageable level  Description: Interventions:  - Assess and monitor patient's anxiety level.   - Monitor for signs and symptoms (heart palpitations, chest pain, shortness of breath, headaches, nausea, feeling jumpy, restlessness, irritable, apprehensive).   - Collaborate with interdisciplinary team and initiate plan and interventions as ordered.  - [place] patient to unit/surroundings  - Explain treatment plan  - Encourage participation in care  - Encourage verbalization of concerns/fears  - Identify coping mechanisms  - Assist in developing anxiety-reducing skills  - Administer/offer alternative therapies  - Limit or eliminate stimulants  Outcome: Progressing     Problem: Risk for Violence/Aggression Toward Others  Goal: Treatment Goal: Refrain from acts of violence/aggression during length of stay, and demonstrate improved impulse control at the time of discharge  Outcome: Progressing  Goal: Verbalize thoughts and feelings  Description: Interventions:  - Assess and re-assess patient's level of risk, every waking shift  - Engage patient in 1:1 interactions, daily, for a minimum of 15 minutes   - Allow patient to express feelings and frustrations in a safe and non-threatening manner   - Establish rapport/trust with patient   Outcome: Progressing  Goal: Refrain from harming others  Outcome: Progressing  Goal: Refrain from destructive acts on the environment or property  Outcome: Progressing  Goal: Control angry outbursts  Description: Interventions:  - Monitor patient closely, per order  - Ensure early verbal de-escalation  - Monitor prn medication needs  - Set reasonable/therapeutic limits, outline behavioral expectations, and consequences   - Provide a non-threatening milieu, utilizing the least restrictive interventions   Outcome: Progressing  Goal: Attend and participate in unit activities,  including therapeutic, recreational, and educational groups  Description: Interventions:  - Provide therapeutic and educational activities daily, encourage attendance and participation, and document same in the medical record   Outcome: Progressing  Goal: Identify appropriate positive anger management techniques  Description: Interventions:  - Offer anger management and coping skills groups   - Staff will provide positive feedback for appropriate anger control  Outcome: Progressing     Problem: DISCHARGE PLANNING - CARE MANAGEMENT  Goal: Discharge to post-acute care or home with appropriate resources  Description: INTERVENTIONS:  - Conduct assessment to determine patient/family and health care team treatment goals, and need for post-acute services based on payer coverage, community resources, and patient preferences, and barriers to discharge  - Address psychosocial, clinical, and financial barriers to discharge as identified in assessment in conjunction with the patient/family and health care team  - Arrange appropriate level of post-acute services according to patient’s   needs and preference and payer coverage in collaboration with the physician and health care team  - Communicate with and update the patient/family, physician, and health care team regarding progress on the discharge plan  - Arrange appropriate transportation to post-acute venues  Outcome: Progressing

## 2023-12-05 NOTE — NURSING NOTE
Patient notably calmer on approach, able to verbalize the events leading up to restraint episode. Patient decreased to 2pt restraints at 1741 R arm L leg.
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
## 2023-12-05 NOTE — PLAN OF CARE
Problem: SAFETY, RESTRAINT - VIOLENT/SELF-DESTRUCTIVE  Goal: Remains free of harm/injury from restraints (Restraint for Violent/Self-Destructive Behavior)  Description: INTERVENTIONS:  - Instruct patient/family regarding restraint use   - Assess and monitor physiologic and psychological status   - Provide interventions and comfort measures to meet assessed patient needs   - Ensure continuous in person monitoring is provided   - Identify and implement measures to help patient regain control  - Assess readiness for release of restraint  Outcome: Not Progressing  Goal: Returns to optimal restraint-free functioning  Description: INTERVENTIONS:  - Assess the patient's behavior and symptoms that indicate continued need for restraint  - Identify and implement measures to help patient regain control  - Assess readiness for release of restraint   Outcome: Not Progressing

## 2023-12-06 LAB
GAMMA INTERFERON BACKGROUND BLD IA-ACNC: <0 IU/ML
INR PPP: 4.7 (ref 0.84–1.19)
M TB IFN-G BLD-IMP: NEGATIVE
M TB IFN-G CD4+ BCKGRND COR BLD-ACNC: 0.05 IU/ML
M TB IFN-G CD4+ BCKGRND COR BLD-ACNC: 0.06 IU/ML
MITOGEN IGNF BCKGRD COR BLD-ACNC: 10 IU/ML
PROTHROMBIN TIME: 44.4 SECONDS (ref 11.6–14.5)

## 2023-12-06 PROCEDURE — 99232 SBSQ HOSP IP/OBS MODERATE 35: CPT | Performed by: STUDENT IN AN ORGANIZED HEALTH CARE EDUCATION/TRAINING PROGRAM

## 2023-12-06 PROCEDURE — 85610 PROTHROMBIN TIME: CPT | Performed by: NURSE PRACTITIONER

## 2023-12-06 RX ORDER — OLANZAPINE 10 MG/2ML
10 INJECTION, POWDER, FOR SOLUTION INTRAMUSCULAR EVERY 6 HOURS PRN
Status: DISCONTINUED | OUTPATIENT
Start: 2023-12-06 | End: 2024-02-01 | Stop reason: HOSPADM

## 2023-12-06 RX ORDER — OLANZAPINE 2.5 MG/1
2.5 TABLET, FILM COATED ORAL
Status: DISCONTINUED | OUTPATIENT
Start: 2023-12-06 | End: 2024-02-01 | Stop reason: HOSPADM

## 2023-12-06 RX ORDER — OLANZAPINE 10 MG/2ML
5 INJECTION, POWDER, FOR SOLUTION INTRAMUSCULAR EVERY 6 HOURS PRN
Status: DISCONTINUED | OUTPATIENT
Start: 2023-12-06 | End: 2024-02-01 | Stop reason: HOSPADM

## 2023-12-06 RX ORDER — PALIPERIDONE 3 MG/1
3 TABLET, EXTENDED RELEASE ORAL DAILY
Status: DISCONTINUED | OUTPATIENT
Start: 2023-12-06 | End: 2023-12-07

## 2023-12-06 RX ORDER — OLANZAPINE 5 MG/1
5 TABLET ORAL EVERY 6 HOURS PRN
Status: DISCONTINUED | OUTPATIENT
Start: 2023-12-06 | End: 2024-02-01 | Stop reason: HOSPADM

## 2023-12-06 RX ORDER — OLANZAPINE 10 MG/1
10 TABLET ORAL EVERY 6 HOURS PRN
Status: DISCONTINUED | OUTPATIENT
Start: 2023-12-06 | End: 2024-02-01 | Stop reason: HOSPADM

## 2023-12-06 RX ADMIN — RISPERIDONE 0.5 MG: 0.5 TABLET ORAL at 05:25

## 2023-12-06 RX ADMIN — CLONIDINE HYDROCHLORIDE 0.1 MG: 0.1 TABLET ORAL at 08:28

## 2023-12-06 RX ADMIN — LEVOTHYROXINE SODIUM 125 MCG: 125 TABLET ORAL at 05:01

## 2023-12-06 RX ADMIN — OXYBUTYNIN CHLORIDE 5 MG: 5 TABLET ORAL at 18:16

## 2023-12-06 RX ADMIN — TOPIRAMATE 200 MG: 100 TABLET, FILM COATED ORAL at 17:53

## 2023-12-06 RX ADMIN — CYANOCOBALAMIN TAB 1000 MCG 1000 MCG: 1000 TAB at 08:27

## 2023-12-06 RX ADMIN — CEPHALEXIN 500 MG: 500 CAPSULE ORAL at 00:13

## 2023-12-06 RX ADMIN — PALIPERIDONE 3 MG: 3 TABLET, EXTENDED RELEASE ORAL at 10:29

## 2023-12-06 RX ADMIN — ATOMOXETINE 40 MG: 40 CAPSULE ORAL at 08:28

## 2023-12-06 RX ADMIN — OXYBUTYNIN CHLORIDE 5 MG: 5 TABLET ORAL at 08:30

## 2023-12-06 RX ADMIN — CLONIDINE HYDROCHLORIDE 0.1 MG: 0.1 TABLET ORAL at 21:25

## 2023-12-06 RX ADMIN — CEPHALEXIN 500 MG: 500 CAPSULE ORAL at 17:53

## 2023-12-06 RX ADMIN — Medication 250 MG: at 08:29

## 2023-12-06 RX ADMIN — CEPHALEXIN 500 MG: 500 CAPSULE ORAL at 12:08

## 2023-12-06 RX ADMIN — BUPROPION HYDROCHLORIDE 450 MG: 300 TABLET, EXTENDED RELEASE ORAL at 08:27

## 2023-12-06 RX ADMIN — FUROSEMIDE 20 MG: 20 TABLET ORAL at 08:29

## 2023-12-06 RX ADMIN — CEPHALEXIN 500 MG: 500 CAPSULE ORAL at 05:01

## 2023-12-06 RX ADMIN — TOPIRAMATE 200 MG: 100 TABLET, FILM COATED ORAL at 08:28

## 2023-12-06 RX ADMIN — Medication 250 MG: at 17:53

## 2023-12-06 RX ADMIN — OXCARBAZEPINE 300 MG: 300 TABLET, FILM COATED ORAL at 08:27

## 2023-12-06 RX ADMIN — HYDROXYZINE HYDROCHLORIDE 50 MG: 50 TABLET, FILM COATED ORAL at 14:37

## 2023-12-06 RX ADMIN — MELATONIN TAB 3 MG 3 MG: 3 TAB at 21:25

## 2023-12-06 RX ADMIN — OXCARBAZEPINE 300 MG: 300 TABLET, FILM COATED ORAL at 21:26

## 2023-12-06 RX ADMIN — OLANZAPINE 5 MG: 5 TABLET, FILM COATED ORAL at 16:46

## 2023-12-06 RX ADMIN — NALTREXONE HYDROCHLORIDE 50 MG: 50 TABLET, FILM COATED ORAL at 08:28

## 2023-12-06 RX ADMIN — PANTOPRAZOLE SODIUM 40 MG: 40 TABLET, DELAYED RELEASE ORAL at 05:01

## 2023-12-06 RX ADMIN — LOSARTAN POTASSIUM 100 MG: 50 TABLET, FILM COATED ORAL at 08:27

## 2023-12-06 RX ADMIN — LURASIDONE HYDROCHLORIDE 80 MG: 80 TABLET, FILM COATED ORAL at 17:53

## 2023-12-06 NOTE — NURSING NOTE
"After risperidone 0.5 mg administration, this writer sat in room with pt. Pt stated she was worried that her father would not be able to care for her cat. Pt tearfully stated, \"I've had him for 9 years, he's attached to me.\" Pt reassured. Pt asked to identify one positive outcome of being inpatient here. Pt was unable to identify one, and pt stated that she would try to find one as today progressed. Pt stated that she feels better after conversation with this writer.   "

## 2023-12-06 NOTE — TREATMENT TEAM
"   12/06/23 0520   Broset Violence Checklist   Assessment type Reassessment   Irritability 1   Confusion 0   Boisterousness 1   Threatening physical violence 0   Verbal threats 0   Violence 0   Broset score 2     Pt approached this writer in the brown and stated \"Can I talk to you?\" Pt tearful and visibly upset. Upon entering pt's room, the pt began rubbing hands together and rocking back and forth. Pt tearfully stated \"I'm really worried about my cat.\" Pt visibly anxious and agitated. Pt states \"Why can't I just go home?\" Pt asked if she felt agitated and the pt nodded head in response. Pt offered PO PRN, and pt accepted. Pt provided PRN Risperidone 0.5 mg at 0525.  "

## 2023-12-06 NOTE — NURSING NOTE
Pt is visible on the unit and attends groups. Pt is calm and cooperative with care at this time. Pt reports feeling better today. Pt takes medications as scheduled and has good intake at meals time. Pt denies psych s/sat current time . Will maintain q 7 mins checks.

## 2023-12-06 NOTE — NURSING NOTE
Pt given Prn Atarax 50 mg c/o anxiety due to peer yelling at her. Will follow up for effectiveness.

## 2023-12-06 NOTE — NURSING NOTE
"Patient shaking in the dining room, refusing dinner and to speak to staff. With encouragement patient reporting anger and \"getting worked up\" about not seeing . Patient difficult to reassure. Broset score is 2. Patient accepting of PRN zyprexa 5mg at 1646.  "

## 2023-12-06 NOTE — PROGRESS NOTES
Progress Note - Behavioral Health   Blanca Mason 53 y.o. female MRN: [id number]  Unit/Bed#: [facility] 651-02 Encounter: [id number]       Patient was visited on unit for continuing care; chart reviewed and discussed with multidisciplinary treatment team. On approach, the patient was calm and cooperative.  She reported feeling upset about being in the hospital and concerned about her cat as reportedly her father told her that he would not be able to take care of her cat for more than a week.  As per nursing report, the patient became irritable yesterday, threw a container of toiletries at nurse and was not verbally redirectable required restraints and received Ativan 1 mg IM at 1639 yesterday and also received Risperdal 0.5 mg p.o. as needed at 5:25 AM this morning for irritability.     She reported feeling better today and noted that she has realized that she should stay in the hospital to get better.  She stated that she will contact her neighbor who helped her in the past taking care of her cat and also reportedly notified her job that she is in the hospital.  Endorsed good mood and appetite and denied any problem initiating or maintaining sleep last night. Denied A/VH currently. Denied SI/HI, intent or plan upon direct inquiry at this time.    Patient continues to be interactive with staff and peers.the patient has poor frustration tolerance especially when her needs not met immediately.    Patient accepted all offered medications and reported feeling better. No adverse effects of medications noted or reported.  Given the poor response to Latuda, history of noncompliance and to avoid polypharmacy, Latuda and Haldol Dec is being cross titrated to Invega and will consider SN[place] Invega Sustenna upon further dose adjustments.  Pending Trileptal level.  The patient benefits from referral to EAC given the multiple recent hospitalizations and suicide attempts and inability to stay safe and functional in the community  despite being connected to the ACT services.    Current Mental Status Evaluation:  Appearance and attitude: appeared as stated age, casually dressed, tattooed, wearing eyeglasses, with good hygiene  Eye contact: fair  Motor Function: within normal limits, intact gait, No PMA/PMR  Gait/station: normal gait/station and normal balance  Speech: talking in soft tone, with normal latency and amount  Language: No overt abnormality  Mood/affect: dysphoric / Affect was constricted, hyper-intense, mood-congruent  Thought Processes: concrete, preoccupied with her cat  Thought content: denied suicidal ideations or homicidal ideations, no overt delusions elicited, negative thinking, ruminating thoughts  Associations: concrete associations, perseverative  Perceptual disturbances: denies Auditory/Visual/Tactile Hallucinations  Orientation: oriented to time, person, place and to the situational context  Cognitive Function: intact  Memory: recent and remote memory grossly intact  Intellect: unable to assess  Fund of knowledge: aware of current events, aware of past history, and vocabulary average  Impulse control: impulsive at times  Insight/judgment: limited/limited    Vital signs in last 24 hours:    Temp:  [97.3 °F (36.3 °C)-97.5 °F (36.4 °C)] 97.3 °F (36.3 °C)  HR:  [70-85] 73  Resp:  [18-20] 20  BP: (114-144)/(58-73) 132/67    Laboratory results: I have personally reviewed all pertinent laboratory/tests results    Results from the past 24 hours:   Recent Results (from the past 24 hour(s))   ECG 12 lead    Collection Time: 12/05/23 12:08 PM   Result Value Ref Range    Ventricular Rate 72 BPM    Atrial Rate 72 BPM    OK Interval 194 ms    QRSD Interval 94 ms    QT Interval 374 ms    QTC Interval 409 ms    P Axis 50 degrees    QRS Axis -18 degrees    T Wave Axis 8 degrees   Protime-INR    Collection Time: 12/06/23  5:04 AM   Result Value Ref Range    Protime 44.4 (H) 11.6 - 14.5 seconds    INR 4.70 (H) 0.84 - 1.19       Progress  Toward Goals: no significant improvement    Assessment:  Principal Problem:    Schizoaffective disorder, bipolar type (HCC)  Active Problems:    Borderline personality disorder (HCC)    Benign essential hypertension    Edema    Hypothyroidism    MAG (obstructive sleep apnea)    Overactive bladder    Sjogren's syndrome (HCC)    Medical clearance for psychiatric admission    Anxiety    History of pulmonary embolism    Ingrown toenail        Plan:  - f/u [facility] recs regarding the medical problems  - Follow-up Trileptal level  - Continue medication titration and treatment plan; adjust medication to optimize treatment response and as clinically indicated.     Scheduled medications:  Current Facility-Administered Medications   Medication Dose Route Frequency Provider Last Rate    aluminum-magnesium hydroxide-simethicone  30 mL Oral Q4H PRN LAURA FieldsNP      atoMOXetine  40 mg Oral Daily Anatoly Prajapati, DO      buPROPion  450 mg Oral Daily Anatoly Prajapati, DO      cephalexin  500 mg Oral Q6H [facility] Wendy Anderson, LAURANP      cloNIDine  0.1 mg Oral Q12H [facility] Anatoly Prajapati, DO      cyanocobalamin  1,000 mcg Oral Daily Dianna Mcrae, LAURANP      cyanocobalamin  1,000 mcg Intramuscular Q30 Days JHONATAN Berg      ergocalciferol  50,000 Units Oral Weekly JHONATAN Berg      furosemide  20 mg Oral Daily Wendy Anderson, LAURANP      hydrOXYzine HCL  25 mg Oral Q6H PRN Max 4/day Virginia Mccullough, CRNP      hydrOXYzine HCL  50 mg Oral Q6H PRN Max 4/day Virginia Mccullough, LAURANP      ibuprofen  200 mg Oral Q6H PRN Virginia Mccullough, CRNP      ibuprofen  400 mg Oral Q6H PRN Virginia Mccullough, CRNP      ibuprofen  600 mg Oral Q8H PRN Virginia Mccullough, CRNP      levothyroxine  125 mcg Oral Early Morning Wendy Anderson, CRNP      loperamide  2 mg Oral Q4H PRN Anatoly Prajapati, DO      LORazepam  1 mg Intramuscular Q6H PRN Max 3/day Virginia Mccullough, LAURANP      LORazepam  1 mg Oral  Q6H PRN Max 3/day JHONATAN Anne      losartan  100 mg Oral Daily JHONATAN Fields      lurasidone  80 mg Oral Daily With Dinner Frederick Smith MD      melatonin  3 mg Oral HS Frederick Smith MD      naltrexone  50 mg Oral Daily Anatoly Prajapati,       OLANZapine  10 mg Oral Q6H PRN Frederick Smith MD      Or    OLANZapine  10 mg Intramuscular Q6H PRN Frederick Smith MD      OLANZapine  5 mg Oral Q6H PRN Frederick Smith MD      Or    OLANZapine  5 mg Intramuscular Q6H PRN Frederick Smith MD      OLANZapine  2.5 mg Oral Q3H PRN Frederick Smith MD      OXcarbazepine  300 mg Oral Q12H TASHA Anatoly Prajapati, DO      oxybutynin  5 mg Oral BID JHONATAN Fields      paliperidone  3 mg Oral Daily Frederick Smith MD      pantoprazole  40 mg Oral Early Morning JHONATAN Fields      polyethylene glycol  17 g Oral Daily PRN JHONATAN Anne      saccharomyces boulardii  250 mg Oral BID Anatoly Prajapati, DO      senna-docusate sodium  1 tablet Oral Daily PRN JHONATAN Anne      topiramate  200 mg Oral BID Anatoly Prajapati,       traZODone  50 mg Oral HS PRN JHONATAN Anne      warfarin  10 mg Oral Daily (warfarin) JHONATAN Fields          PRN:    aluminum-magnesium hydroxide-simethicone    hydrOXYzine HCL    hydrOXYzine HCL    ibuprofen    ibuprofen    ibuprofen    loperamide    LORazepam    LORazepam    OLANZapine **OR** OLANZapine    OLANZapine **OR** OLANZapine    OLANZapine    polyethylene glycol    senna-docusate sodium    traZODone    - Observation: routine    - VS: as per unit protocol  - Diet: Regular diet  - Psychoeducation (benefits and potential risks) discussed, importance of compliance with the psychiatric treatment reiterated, and the patient verbalized understanding of the matter  - Encourage group attendance and milieu therapy    - The pt was educated and agreed to verbalize any suicidal thoughts, frustrations or concerns to the nursing  staff, immediately.    - Dispo: TBD       Next of Kin  Extended Emergency Contact Information  Primary Emergency Contact: Luigi Mason  Address: [address] [place] [address] [place]  Home Phone: [phone]  Mobile Phone: [phone]  Relation: Father  Secondary Emergency Contact: Leonor Ji  Address: [address] [place] [address] [facility] [place]  Home Phone: [phone]  Mobile Phone: [phone]  Relation: Friend      Counseling / Coordination of Care  Patient's progress discussed with staff in treatment team meeting.  Medications, treatment progress and treatment plan reviewed with patient.  Medication changes discussed with patient.  Medication education provided to patient.  Coping skills reviewed with patient.  Supportive therapy provided to patient.  Cognitive techniques utilized during the session.  Reassurance and supportive therapy provided.  Reoriented to reality and reassured.  Encouraged participation in milieu and group therapy on the unit.  Crisis/safety plan discussed with patient.     Frederick Smith MD  Attending Psychiatrist   [facility]

## 2023-12-06 NOTE — PROGRESS NOTES
12/06/23 1100   Activity/Group Checklist   Group Wellness  (progressive muscle relaxation.)   Attendance Attended   Attendance Duration (min) 31-45   Interactions Interacted appropriately  (Pt. pleasant well focused and engaged actively in the prompted movements of the guided relaxation.)   Affect/Mood Appropriate;Calm;Normal range   Goals Achieved Able to listen to others;Able to engage in interactions

## 2023-12-06 NOTE — PLAN OF CARE
Problem: Ineffective Coping  Goal: Cooperates with admission process  Description: Interventions:   - Complete admission process  Outcome: Completed  Goal: Identifies ineffective coping skills  Outcome: Progressing  Goal: Identifies healthy coping skills  Outcome: Not Progressing  Goal: Demonstrates healthy coping skills  Outcome: Not Progressing  Goal: Participates in unit activities  Description: Interventions:  - Provide therapeutic environment   - Provide required programming   - Redirect inappropriate behaviors   Outcome: Progressing  Goal: Patient/Family participate in treatment and DC plans  Description: Interventions:  - Provide therapeutic environment  Outcome: Progressing  Goal: Patient/Family verbalizes awareness of resources  Outcome: Progressing  Goal: Understands least restrictive measures  Description: Interventions:  - Utilize least restrictive behavior  Outcome: Not Progressing  Goal: Free from restraint events  Description: - Utilize least restrictive measures   - Provide behavioral interventions   - Redirect inappropriate behaviors   Outcome: Not Progressing     Problem: Risk for Self Injury/Neglect  Goal: Treatment Goal: Remain safe during length of stay, learn and adopt new coping skills, and be free of self-injurious ideation, impulses and acts at the time of discharge  Outcome: Progressing  Goal: Verbalize thoughts and feelings  Description: Interventions:  - Assess and re-assess patient's lethality and potential for self-injury  - Engage patient in 1:1 interactions, daily, for a minimum of 15 minutes  - Encourage patient to express feelings, fears, frustrations, hopes  - Establish rapport/trust with patient   Outcome: Not Progressing  Goal: Refrain from harming self  Description: Interventions:  - Monitor patient closely, per order  - Develop a trusting relationship  - Supervise medication ingestion, monitor effects and side effects   Outcome: Progressing  Goal: Attend and participate in  unit activities, including therapeutic, recreational, and educational groups  Description: Interventions:  - Provide therapeutic and educational activities daily, encourage attendance and participation, and document same in the medical record  - Obtain collateral information, encourage visitation and family involvement in care   Outcome: Progressing  Goal: Recognize maladaptive responses and adopt new coping mechanisms  Outcome: Not Progressing  Goal: Complete daily ADLs, including personal hygiene independently, as able  Description: Interventions:  - Observe, teach, and assist patient with ADLS  - Monitor and promote a balance of rest/activity, with adequate nutrition and elimination  Outcome: Progressing     Problem: Depression  Goal: Treatment Goal: Demonstrate behavioral control of depressive symptoms, verbalize feelings of improved mood/affect, and adopt new coping skills prior to discharge  Outcome: Progressing  Goal: Verbalize thoughts and feelings  Description: Interventions:  - Assess and re-assess patient's level of risk   - Engage patient in 1:1 interactions, daily, for a minimum of 15 minutes   - Encourage patient to express feelings, fears, frustrations, hopes   Outcome: Progressing  Goal: Refrain from harming self  Description: Interventions:  - Monitor patient closely, per order   - Supervise medication ingestion, monitor effects and side effects   Outcome: Progressing  Goal: Refrain from isolation  Description: Interventions:  - Develop a trusting relationship   - Encourage socialization   Outcome: Progressing  Goal: Refrain from self-neglect  Outcome: Progressing  Goal: Attend and participate in unit activities, including therapeutic, recreational, and educational groups  Description: Interventions:  - Provide therapeutic and educational activities daily, encourage attendance and participation, and document same in the medical record   Outcome: Progressing  Goal: Complete daily ADLs, including  personal hygiene independently, as able  Description: Interventions:  - Observe, teach, and assist patient with ADLS  -  Monitor and promote a balance of rest/activity, with adequate nutrition and elimination   Outcome: Progressing     Problem: Anxiety  Goal: Anxiety is at manageable level  Description: Interventions:  - Assess and monitor patient's anxiety level.   - Monitor for signs and symptoms (heart palpitations, chest pain, shortness of breath, headaches, nausea, feeling jumpy, restlessness, irritable, apprehensive).   - Collaborate with interdisciplinary team and initiate plan and interventions as ordered.  - [place] patient to unit/surroundings  - Explain treatment plan  - Encourage participation in care  - Encourage verbalization of concerns/fears  - Identify coping mechanisms  - Assist in developing anxiety-reducing skills  - Administer/offer alternative therapies  - Limit or eliminate stimulants  Outcome: Progressing     Problem: Risk for Violence/Aggression Toward Others  Goal: Treatment Goal: Refrain from acts of violence/aggression during length of stay, and demonstrate improved impulse control at the time of discharge  Outcome: Not Progressing  Goal: Verbalize thoughts and feelings  Description: Interventions:  - Assess and re-assess patient's level of risk, every waking shift  - Engage patient in 1:1 interactions, daily, for a minimum of 15 minutes   - Allow patient to express feelings and frustrations in a safe and non-threatening manner   - Establish rapport/trust with patient   Outcome: Progressing  Goal: Refrain from harming others  Outcome: Not Progressing  Goal: Refrain from destructive acts on the environment or property  Outcome: Progressing  Goal: Control angry outbursts  Description: Interventions:  - Monitor patient closely, per order  - Ensure early verbal de-escalation  - Monitor prn medication needs  - Set reasonable/therapeutic limits, outline behavioral expectations, and consequences    - Provide a non-threatening milieu, utilizing the least restrictive interventions   Outcome: Not Progressing  Goal: Attend and participate in unit activities, including therapeutic, recreational, and educational groups  Description: Interventions:  - Provide therapeutic and educational activities daily, encourage attendance and participation, and document same in the medical record   Outcome: Progressing  Goal: Identify appropriate positive anger management techniques  Description: Interventions:  - Offer anger management and coping skills groups   - Staff will provide positive feedback for appropriate anger control  Outcome: Not Progressing     Problem: SAFETY, RESTRAINT - VIOLENT/SELF-DESTRUCTIVE  Goal: Remains free of harm/injury from restraints (Restraint for Violent/Self-Destructive Behavior)  Description: INTERVENTIONS:  - Instruct patient/family regarding restraint use   - Assess and monitor physiologic and psychological status   - Provide interventions and comfort measures to meet assessed patient needs   - Ensure continuous in person monitoring is provided   - Identify and implement measures to help patient regain control  - Assess readiness for release of restraint  Outcome: Progressing  Goal: Returns to optimal restraint-free functioning  Description: INTERVENTIONS:  - Assess the patient's behavior and symptoms that indicate continued need for restraint  - Identify and implement measures to help patient regain control  - Assess readiness for release of restraint   Outcome: Progressing

## 2023-12-06 NOTE — TREATMENT TEAM
"Pt attended all groups.  Pt constricted affect and superficial responses. Stating she is \"honest\" during discission on anger. Pt in afternoon expressed concern for her cat and stated she found someone to help and was relieved.        12/06/23 1000   Activity/Group Checklist   Group Anger management   Attendance Attended   Attendance Duration (min) 31-45   Interactions Interacted appropriately   Affect/Mood Constricted   Goals Achieved Identified feelings;Identified relapse prevention strategies;Identified triggers;Discussed self-esteem issues;Discussed coping strategies;Able to listen to others;Able to engage in interactions;Able to reflect/comment on own behavior;Verbalized increased hopefulness;Able to manage/cope with feelings;Able to self-disclose;Able to recieve feedback         "

## 2023-12-06 NOTE — NURSING NOTE
"Pt states she feels better following PRN risperidone. Pt agrees that she will inform staff if she begins to feel agitated/anxious again. Pt pleasant and polite stating \"Thank you, I hope you have a nice day.\" PRN Risperidone effective.  "

## 2023-12-06 NOTE — PROGRESS NOTES
12/06/23 0818   Team Meeting   Meeting Type Daily Rounds   Initial Conference Date 12/06/23   Next Conference Date 12/07/23   Team Members Present   Team Members Present Physician;Nurse;;;Occupational Therapist   Physician Team Member Dr Smith, SHEFALI Lovell   Nursing Team Member Reyes   Care Management Team Member Atiya   Social Work Team Member Dalia TALBOT Team Member Yeison   Patient/Family Present   Patient Present No   Patient's Family Present No     Add Peter - pharmacy: 4 point restraints last night, throwing things at nurse, upset about going to EAC and no one to watch her cat, per [place] she [place] not meet criteria for EAC at this time, she needs to be here a total of 45 days in a 12 month period or 2 303 commitments in a 12 month period (she has only had one), had 2 IM's in the past 2 days, chest pain EKG ok, PRN changed to Zyprexa, will switch to Invega possibly. Needs better behavioral control. Borderline, impulsive.

## 2023-12-06 NOTE — NURSING NOTE
"Pt isolated to room at the start of this shift. Pt guarded and irritable initially on approach. Pt stated she was feeling \"better.\" Pt refused to answer most assessment questions and reported \"I'm just worried about my cat.\" As conversation progressed, pt became more pleasant and bright. Pts gait remains steady and the pt remains independent for ADLs. Pt compliant with HS medications. Pt is able to and encouraged to inform staff of any needs.  "

## 2023-12-07 PROCEDURE — 99232 SBSQ HOSP IP/OBS MODERATE 35: CPT | Performed by: STUDENT IN AN ORGANIZED HEALTH CARE EDUCATION/TRAINING PROGRAM

## 2023-12-07 RX ORDER — PALIPERIDONE 6 MG/1
6 TABLET, EXTENDED RELEASE ORAL DAILY
Status: DISCONTINUED | OUTPATIENT
Start: 2023-12-08 | End: 2023-12-11

## 2023-12-07 RX ADMIN — LEVOTHYROXINE SODIUM 125 MCG: 125 TABLET ORAL at 06:15

## 2023-12-07 RX ADMIN — NALTREXONE HYDROCHLORIDE 50 MG: 50 TABLET, FILM COATED ORAL at 08:38

## 2023-12-07 RX ADMIN — CEPHALEXIN 500 MG: 500 CAPSULE ORAL at 06:15

## 2023-12-07 RX ADMIN — MELATONIN TAB 3 MG 3 MG: 3 TAB at 21:36

## 2023-12-07 RX ADMIN — OXYBUTYNIN CHLORIDE 5 MG: 5 TABLET ORAL at 08:38

## 2023-12-07 RX ADMIN — TOPIRAMATE 200 MG: 100 TABLET, FILM COATED ORAL at 17:11

## 2023-12-07 RX ADMIN — PANTOPRAZOLE SODIUM 40 MG: 40 TABLET, DELAYED RELEASE ORAL at 06:15

## 2023-12-07 RX ADMIN — CEPHALEXIN 500 MG: 500 CAPSULE ORAL at 01:00

## 2023-12-07 RX ADMIN — BUPROPION HYDROCHLORIDE 450 MG: 300 TABLET, EXTENDED RELEASE ORAL at 08:38

## 2023-12-07 RX ADMIN — Medication 250 MG: at 17:11

## 2023-12-07 RX ADMIN — CLONIDINE HYDROCHLORIDE 0.1 MG: 0.1 TABLET ORAL at 08:38

## 2023-12-07 RX ADMIN — OXCARBAZEPINE 300 MG: 300 TABLET, FILM COATED ORAL at 08:38

## 2023-12-07 RX ADMIN — ATOMOXETINE 40 MG: 40 CAPSULE ORAL at 08:38

## 2023-12-07 RX ADMIN — OXYBUTYNIN CHLORIDE 5 MG: 5 TABLET ORAL at 17:27

## 2023-12-07 RX ADMIN — PALIPERIDONE 3 MG: 3 TABLET, EXTENDED RELEASE ORAL at 08:38

## 2023-12-07 RX ADMIN — FUROSEMIDE 20 MG: 20 TABLET ORAL at 08:38

## 2023-12-07 RX ADMIN — CLONIDINE HYDROCHLORIDE 0.1 MG: 0.1 TABLET ORAL at 21:36

## 2023-12-07 RX ADMIN — TOPIRAMATE 200 MG: 100 TABLET, FILM COATED ORAL at 08:38

## 2023-12-07 RX ADMIN — LOSARTAN POTASSIUM 100 MG: 50 TABLET, FILM COATED ORAL at 08:37

## 2023-12-07 RX ADMIN — OXCARBAZEPINE 300 MG: 300 TABLET, FILM COATED ORAL at 21:36

## 2023-12-07 RX ADMIN — LURASIDONE HYDROCHLORIDE 60 MG: 40 TABLET, COATED ORAL at 17:11

## 2023-12-07 RX ADMIN — Medication 250 MG: at 08:38

## 2023-12-07 RX ADMIN — CYANOCOBALAMIN TAB 1000 MCG 1000 MCG: 1000 TAB at 08:38

## 2023-12-07 NOTE — PROGRESS NOTES
12/07/23 0735   Team Meeting   Meeting Type Daily Rounds   Initial Conference Date 12/07/23   Next Conference Date 12/08/23   Team Members Present   Team Members Present Physician;Nurse;;;Occupational Therapist   Physician Team Member MONIQUE Rodriguez T. Gonzalez   Nursing Team Member Reyes   Care Management Team Member Atiya   Social Work Team Member Dalia TALBOT Team Member Yeison   Patient/Family Present   Patient Present No   Patient's Family Present No     Refused dinner last night and refusing to talk to staff, CM spoke to patient she was upset about another peer and reported anger, PRN zyprexa given and effective, per [facility] warfarin held INR elevated, agreed to Invega Sustenna, will cross titrate meds

## 2023-12-07 NOTE — TREATMENT TEAM
Pt attended group.  Pt superficial and constricted..  Pt concerned about her Dad being able to visit her for [place].     12/07/23 1000   Activity/Group Checklist   Group Community meeting   Attendance Attended   Attendance Duration (min) 31-45   Interactions Other (Comment)  (superficial)   Affect/Mood Constricted   Goals Achieved Identified feelings;Identified relapse prevention strategies;Increased hopefulness;Discussed coping strategies;Able to engage in interactions;Able to listen to others;Able to reflect/comment on own behavior;Able to manage/cope with feelings;Verbalized increased hopefulness;Able to self-disclose

## 2023-12-07 NOTE — PLAN OF CARE
Problem: Ineffective Coping  Goal: Identifies ineffective coping skills  Outcome: Progressing  Goal: Identifies healthy coping skills  Outcome: Progressing  Goal: Demonstrates healthy coping skills  Outcome: Progressing  Goal: Participates in unit activities  Description: Interventions:  - Provide therapeutic environment   - Provide required programming   - Redirect inappropriate behaviors   Outcome: Progressing  Goal: Patient/Family participate in treatment and DC plans  Description: Interventions:  - Provide therapeutic environment  Outcome: Progressing  Goal: Patient/Family verbalizes awareness of resources  Outcome: Progressing  Goal: Understands least restrictive measures  Description: Interventions:  - Utilize least restrictive behavior  Outcome: Progressing  Goal: Free from restraint events  Description: - Utilize least restrictive measures   - Provide behavioral interventions   - Redirect inappropriate behaviors   Outcome: Progressing     Problem: Risk for Self Injury/Neglect  Goal: Treatment Goal: Remain safe during length of stay, learn and adopt new coping skills, and be free of self-injurious ideation, impulses and acts at the time of discharge  Outcome: Progressing  Goal: Verbalize thoughts and feelings  Description: Interventions:  - Assess and re-assess patient's lethality and potential for self-injury  - Engage patient in 1:1 interactions, daily, for a minimum of 15 minutes  - Encourage patient to express feelings, fears, frustrations, hopes  - Establish rapport/trust with patient   Outcome: Progressing  Goal: Refrain from harming self  Description: Interventions:  - Monitor patient closely, per order  - Develop a trusting relationship  - Supervise medication ingestion, monitor effects and side effects   Outcome: Progressing  Goal: Attend and participate in unit activities, including therapeutic, recreational, and educational groups  Description: Interventions:  - Provide therapeutic and  educational activities daily, encourage attendance and participation, and document same in the medical record  - Obtain collateral information, encourage visitation and family involvement in care   Outcome: Progressing  Goal: Recognize maladaptive responses and adopt new coping mechanisms  Outcome: Progressing  Goal: Complete daily ADLs, including personal hygiene independently, as able  Description: Interventions:  - Observe, teach, and assist patient with ADLS  - Monitor and promote a balance of rest/activity, with adequate nutrition and elimination  Outcome: Progressing     Problem: Depression  Goal: Treatment Goal: Demonstrate behavioral control of depressive symptoms, verbalize feelings of improved mood/affect, and adopt new coping skills prior to discharge  Outcome: Progressing  Goal: Verbalize thoughts and feelings  Description: Interventions:  - Assess and re-assess patient's level of risk   - Engage patient in 1:1 interactions, daily, for a minimum of 15 minutes   - Encourage patient to express feelings, fears, frustrations, hopes   Outcome: Progressing  Goal: Refrain from harming self  Description: Interventions:  - Monitor patient closely, per order   - Supervise medication ingestion, monitor effects and side effects   Outcome: Progressing  Goal: Refrain from isolation  Description: Interventions:  - Develop a trusting relationship   - Encourage socialization   Outcome: Progressing  Goal: Refrain from self-neglect  Outcome: Progressing  Goal: Attend and participate in unit activities, including therapeutic, recreational, and educational groups  Description: Interventions:  - Provide therapeutic and educational activities daily, encourage attendance and participation, and document same in the medical record   Outcome: Progressing  Goal: Complete daily ADLs, including personal hygiene independently, as able  Description: Interventions:  - Observe, teach, and assist patient with ADLS  -  Monitor and promote  a balance of rest/activity, with adequate nutrition and elimination   Outcome: Progressing     Problem: Anxiety  Goal: Anxiety is at manageable level  Description: Interventions:  - Assess and monitor patient's anxiety level.   - Monitor for signs and symptoms (heart palpitations, chest pain, shortness of breath, headaches, nausea, feeling jumpy, restlessness, irritable, apprehensive).   - Collaborate with interdisciplinary team and initiate plan and interventions as ordered.  - [place] patient to unit/surroundings  - Explain treatment plan  - Encourage participation in care  - Encourage verbalization of concerns/fears  - Identify coping mechanisms  - Assist in developing anxiety-reducing skills  - Administer/offer alternative therapies  - Limit or eliminate stimulants  Outcome: Progressing     Problem: Risk for Violence/Aggression Toward Others  Goal: Treatment Goal: Refrain from acts of violence/aggression during length of stay, and demonstrate improved impulse control at the time of discharge  Outcome: Progressing  Goal: Verbalize thoughts and feelings  Description: Interventions:  - Assess and re-assess patient's level of risk, every waking shift  - Engage patient in 1:1 interactions, daily, for a minimum of 15 minutes   - Allow patient to express feelings and frustrations in a safe and non-threatening manner   - Establish rapport/trust with patient   Outcome: Progressing  Goal: Refrain from harming others  Outcome: Progressing  Goal: Refrain from destructive acts on the environment or property  Outcome: Progressing  Goal: Control angry outbursts  Description: Interventions:  - Monitor patient closely, per order  - Ensure early verbal de-escalation  - Monitor prn medication needs  - Set reasonable/therapeutic limits, outline behavioral expectations, and consequences   - Provide a non-threatening milieu, utilizing the least restrictive interventions   Outcome: Progressing  Goal: Attend and participate in unit  activities, including therapeutic, recreational, and educational groups  Description: Interventions:  - Provide therapeutic and educational activities daily, encourage attendance and participation, and document same in the medical record   Outcome: Progressing  Goal: Identify appropriate positive anger management techniques  Description: Interventions:  - Offer anger management and coping skills groups   - Staff will provide positive feedback for appropriate anger control  Outcome: Progressing     Problem: SAFETY, RESTRAINT - VIOLENT/SELF-DESTRUCTIVE  Goal: Remains free of harm/injury from restraints (Restraint for Violent/Self-Destructive Behavior)  Description: INTERVENTIONS:  - Instruct patient/family regarding restraint use   - Assess and monitor physiologic and psychological status   - Provide interventions and comfort measures to meet assessed patient needs   - Ensure continuous in person monitoring is provided   - Identify and implement measures to help patient regain control  - Assess readiness for release of restraint  Outcome: Progressing  Goal: Returns to optimal restraint-free functioning  Description: INTERVENTIONS:  - Assess the patient's behavior and symptoms that indicate continued need for restraint  - Identify and implement measures to help patient regain control  - Assess readiness for release of restraint   Outcome: Progressing

## 2023-12-07 NOTE — PROGRESS NOTES
12/07/23 1330   Activity/Group Checklist   Group Life Skills  (happiness and letting go.)   Attendance Attended   Attendance Duration (min) 46-60   Interactions Interacted appropriately  (Pt. laughing at self as she disclosed her love of complaining.)   Affect/Mood Bright;Calm   Goals Achieved Able to listen to others;Able to engage in interactions;Discussed coping strategies

## 2023-12-07 NOTE — PROGRESS NOTES
Progress Note - Behavioral Health   Blanca Mason 53 y.o. female MRN: [id number]  Unit/Bed#: [facility] 651-02 Encounter: [id number]       Patient was visited on unit for continuing care; chart reviewed and discussed with multidisciplinary treatment team. On approach, the patient was calm and cooperative, and tearful at times.  She stated that she feels sad about staying the holiday season in the hospital and noted that she misses her father and her cat and became tearful while was talking about the [place] gift she had already bought for her father.  Otherwise, reported feeling better and noted that she tries to use the coping skills and work on anger management skills.  She denied any changes in appetite, and energy level. No problem initiating and maintaining sleep. Denied A/VH currently. Denied SI/HI, intent or plan upon direct inquiry at this time.    Patient continues to be visible in the milieu and interacts with staff and peers.continues to have poor frustration tolerance when her needs not met immediately with episodes of anger outbursts.  Received Atarax 50 mg p.o. as needed at 1437 for anxiety and Zyprexa 5 mg p.o. as needed at 1646 for irritability yesterday.    Patient accepted all offered medications and reported feeling better. No adverse effects of medications noted or reported. Given the poor response to Latuda, history of noncompliance and to avoid polypharmacy, Latuda and Haldol Dec are being cross titrated to Invega and will consider SNOWDEN Invega Sustenna upon further dose adjustments.  Pending Trileptal level.  The patient benefits from referral to [facility] given the multiple recent hospitalizations and suicide attempts and inability to stay safe and functional in the community despite being connected to the ACT services.    Current Mental Status Evaluation:  Appearance and attitude: appeared as stated age, casually dressed, tattooed, wearing eyeglasses, with good hygiene  Eye contact: good  Motor  Function: within normal limits, intact gait, No PMA/PMR  Gait/station: normal gait/station and normal balance  Speech: normal for rate, rhythm, volume, latency, amount  Language: No overt abnormality  Mood/affect: depressed, less anxious, less dysphoric / Affect was constricted but reactive, mood congruent, tearful  Thought Processes: concrete, less perseverative  Thought content: denied suicidal ideations or homicidal ideations, no overt delusions elicited, ruminating thoughts  Associations: concrete associations, perseverative  Perceptual disturbances: denies Auditory/Visual/Tactile Hallucinations  Orientation: oriented to time, person, place and to the situational context  Cognitive Function: intact  Memory: recent and remote memory grossly intact  Intellect: unable to assess  Fund of knowledge: aware of current events, aware of past history, and vocabulary average  Impulse control: good; impulsive at times  Insight/judgment: limited/limited    Vital signs in last 24 hours:    Temp:  [96.6 °F (35.9 °C)-96.9 °F (36.1 °C)] 96.6 °F (35.9 °C)  HR:  [75-84] 75  Resp:  [17-18] 18  BP: (121-128)/(59-78) 128/78    Laboratory results: I have personally reviewed all pertinent laboratory/tests results    Results from the past 24 hours: No results found for this or any previous visit (from the past 24 hour(s)).    Progress Toward Goals: slight improvement    Assessment:  Principal Problem:    Schizoaffective disorder, bipolar type (HCC)  Active Problems:    Borderline personality disorder (HCC)    Benign essential hypertension    Edema    Hypothyroidism    MAG (obstructive sleep apnea)    Overactive bladder    Sjogren's syndrome (HCC)    Medical clearance for psychiatric admission    Anxiety    History of pulmonary embolism    Ingrown toenail        Plan:  - f/u SLIM recs regarding the medical problems  - f/u Trileptal level  - Continue medication titration and treatment plan; adjust medication to optimize treatment response  and as clinically indicated.     Scheduled medications:  Current Facility-Administered Medications   Medication Dose Route Frequency Provider Last Rate    aluminum-magnesium hydroxide-simethicone  30 mL Oral Q4H PRN Wendy Anderson, JHONATAN      atoMOXetine  40 mg Oral Daily Anatoly Prajapati, DO      buPROPion  450 mg Oral Daily Anatoly Prajapati, DO      cloNIDine  0.1 mg Oral Q12H [facility] Anatoly Prajapati, DO      cyanocobalamin  1,000 mcg Oral Daily Dianna Mcrae, CRNP      cyanocobalamin  1,000 mcg Intramuscular Q30 Days Dianna Mcrae, CRNP      ergocalciferol  50,000 Units Oral Weekly Dianna Mcrae, JHONATAN      furosemide  20 mg Oral Daily Wendy Anderson, CRNP      hydrOXYzine HCL  25 mg Oral Q6H PRN Max 4/day Virginia Mccullough, CRNP      hydrOXYzine HCL  50 mg Oral Q6H PRN Max 4/day Virginia Mccullough, CRNP      ibuprofen  200 mg Oral Q6H PRN Virginia Mccullough, CRNP      ibuprofen  400 mg Oral Q6H PRN Virginia Mccullough, CRNP      ibuprofen  600 mg Oral Q8H PRN Virginia Mccullough, CRNP      levothyroxine  125 mcg Oral Early Morning Wendy Anderson, CRNP      loperamide  2 mg Oral Q4H PRN Anatoly Prajapati, DO      LORazepam  1 mg Intramuscular Q6H PRN Max 3/day Virginia Mccullough, CRNP      LORazepam  1 mg Oral Q6H PRN Max 3/day Virginia Mccullough, CRNP      losartan  100 mg Oral Daily Wendy Anderson, CRNP      lurasidone  60 mg Oral Daily With Dinner Frederick Smith MD      melatonin  3 mg Oral HS Frederick Smith MD      naltrexone  50 mg Oral Daily Anatoly Prajapati, DO      OLANZapine  10 mg Oral Q6H PRN Frederick Smith MD      Or    OLANZapine  10 mg Intramuscular Q6H PRN Frederick Smith MD      OLANZapine  5 mg Oral Q6H PRN Frederick Smith MD      Or    OLANZapine  5 mg Intramuscular Q6H PRN Frederick Smith MD      OLANZapine  2.5 mg Oral Q3H PRN Frederick Smith MD      OXcarbazepine  300 mg Oral Q12H [facility] Antaoly Prajapati, DO      oxybutynin  5 mg Oral BID JHONATAN Fields       [START ON 12/8/2023] paliperidone  6 mg Oral Daily Frederick Smith MD      pantoprazole  40 mg Oral Early Morning Wendy JHONATAN Yin      polyethylene glycol  17 g Oral Daily PRN JHONATAN Anne      saccharomyces boulardii  250 mg Oral BID Anatoly Prajapati, DO      senna-docusate sodium  1 tablet Oral Daily PRN JHONATAN Anne      topiramate  200 mg Oral BID Anatoly Prajapati, DO      traZODone  50 mg Oral HS PRN JHONATAN Anne          PRN:    aluminum-magnesium hydroxide-simethicone    hydrOXYzine HCL    hydrOXYzine HCL    ibuprofen    ibuprofen    ibuprofen    loperamide    LORazepam    LORazepam    OLANZapine **OR** OLANZapine    OLANZapine **OR** OLANZapine    OLANZapine    polyethylene glycol    senna-docusate sodium    traZODone    - Observation: routine    - VS: as per unit protocol  - Diet: Regular diet  - Psychoeducation (benefits and potential risks) discussed, importance of compliance with the psychiatric treatment reiterated, and the patient verbalized understanding of the matter  - Encourage group attendance and milieu therapy    - The pt was educated and agreed to verbalize any suicidal thoughts, frustrations or concerns to the nursing staff, immediately.    - Dispo: TBD       Next of Kin  Extended Emergency Contact Information  Primary Emergency Contact: Luigi Mason  Address: [address] [place] [address] [facility]  Home Phone: [phone]  Mobile Phone: [phone]  Relation: Father  Secondary Emergency Contact: GarrisonLeonor  Address: [address], [place] [id number] [place] [address] of [facility]  Home Phone: [phone]  Mobile Phone: [phone]  Relation: Friend      Counseling / Coordination of Care  Patient's progress discussed with staff in treatment team meeting.  Medications, treatment progress and treatment plan reviewed with patient.  Medication changes discussed with patient.  Medication education  provided to patient.  Coping skills reviewed with patient.  Supportive therapy provided to patient.  Cognitive techniques utilized during the session.  Reassurance and supportive therapy provided.  Reoriented to reality and reassured.  Encouraged participation in milieu and group therapy on the unit.  Crisis/safety plan discussed with patient.     Frederick Smith MD  Attending Psychiatrist   [facility]

## 2023-12-07 NOTE — NURSING NOTE
"Pt visible in the dayroom this evening, coloring with peers. Pt pleasant on approach but appeared anxious. Pt reports anxiety about her cat, but was able to report that she \"found someone to help care for my stephani.\" Pt reports this as her one positive from today. Pt reports \"just a little\" depression because \"I'll be here till the end of the month.\" Pt reassured. Pt denies all other psych symptoms including SI/HI/AH/VH. Pts gait remains steady and the pt remains independent for ADLs. Pt currently resting in bed with even, unlabored respirations with CPAP in place. Pt is able to and encouraged to inform staff of any needs.  "

## 2023-12-07 NOTE — NURSING NOTE
Pt is visible on the unit attending groups and social with peers. Pt reports some anxiety and  denies SI/HI/AH/VH at this time. Pt is medication compliant and has good intake at meals time. Will maintain q 7 mins checks.

## 2023-12-07 NOTE — QUICK NOTE
INR supratherapeutic @ 4.70 (12/6/23).  Coumadin was held last night.  Continue to hold Coumadin tonight and recheck INR in the AM.  Patient's home dose of Coumadin was 7 mg but given mildly sub-therapeutic INR on admission, it was increased to 10 mg daily.  Once INR trends down, back to therapeutic range, would restart Coumadin @ 7.5 mg daily.

## 2023-12-08 LAB
INR PPP: 3.72 (ref 0.84–1.19)
OXCARBAZEPINE SERPL-MCNC: 8 UG/ML (ref 10–35)
PROTHROMBIN TIME: 37.1 SECONDS (ref 11.6–14.5)

## 2023-12-08 PROCEDURE — 85610 PROTHROMBIN TIME: CPT | Performed by: PHYSICIAN ASSISTANT

## 2023-12-08 PROCEDURE — 99232 SBSQ HOSP IP/OBS MODERATE 35: CPT | Performed by: STUDENT IN AN ORGANIZED HEALTH CARE EDUCATION/TRAINING PROGRAM

## 2023-12-08 RX ADMIN — HYDROXYZINE HYDROCHLORIDE 50 MG: 50 TABLET, FILM COATED ORAL at 03:18

## 2023-12-08 RX ADMIN — OXYBUTYNIN CHLORIDE 5 MG: 5 TABLET ORAL at 17:06

## 2023-12-08 RX ADMIN — FUROSEMIDE 20 MG: 20 TABLET ORAL at 08:11

## 2023-12-08 RX ADMIN — CLONIDINE HYDROCHLORIDE 0.1 MG: 0.1 TABLET ORAL at 21:11

## 2023-12-08 RX ADMIN — LEVOTHYROXINE SODIUM 125 MCG: 125 TABLET ORAL at 06:10

## 2023-12-08 RX ADMIN — LURASIDONE HYDROCHLORIDE 60 MG: 40 TABLET, COATED ORAL at 17:06

## 2023-12-08 RX ADMIN — TOPIRAMATE 200 MG: 100 TABLET, FILM COATED ORAL at 08:10

## 2023-12-08 RX ADMIN — OXCARBAZEPINE 300 MG: 300 TABLET, FILM COATED ORAL at 08:10

## 2023-12-08 RX ADMIN — CLONIDINE HYDROCHLORIDE 0.1 MG: 0.1 TABLET ORAL at 08:10

## 2023-12-08 RX ADMIN — BUPROPION HYDROCHLORIDE 450 MG: 300 TABLET, EXTENDED RELEASE ORAL at 08:10

## 2023-12-08 RX ADMIN — MELATONIN TAB 3 MG 3 MG: 3 TAB at 21:11

## 2023-12-08 RX ADMIN — HYDROXYZINE HYDROCHLORIDE 50 MG: 50 TABLET, FILM COATED ORAL at 08:39

## 2023-12-08 RX ADMIN — TOPIRAMATE 200 MG: 100 TABLET, FILM COATED ORAL at 17:06

## 2023-12-08 RX ADMIN — ATOMOXETINE 40 MG: 40 CAPSULE ORAL at 08:10

## 2023-12-08 RX ADMIN — OXYBUTYNIN CHLORIDE 5 MG: 5 TABLET ORAL at 08:13

## 2023-12-08 RX ADMIN — PALIPERIDONE 6 MG: 6 TABLET, EXTENDED RELEASE ORAL at 08:10

## 2023-12-08 RX ADMIN — OXCARBAZEPINE 450 MG: 150 TABLET, FILM COATED ORAL at 21:11

## 2023-12-08 RX ADMIN — LOSARTAN POTASSIUM 100 MG: 50 TABLET, FILM COATED ORAL at 08:11

## 2023-12-08 RX ADMIN — Medication 250 MG: at 08:10

## 2023-12-08 RX ADMIN — CYANOCOBALAMIN TAB 1000 MCG 1000 MCG: 1000 TAB at 08:10

## 2023-12-08 RX ADMIN — NALTREXONE HYDROCHLORIDE 50 MG: 50 TABLET, FILM COATED ORAL at 08:10

## 2023-12-08 RX ADMIN — Medication 250 MG: at 17:06

## 2023-12-08 RX ADMIN — PANTOPRAZOLE SODIUM 40 MG: 40 TABLET, DELAYED RELEASE ORAL at 06:10

## 2023-12-08 NOTE — TREATMENT TEAM
"Pt attended all groups today.  Pt able to self reflect with prompting.  Pt did indicate low self esteem and self worth with low self motivation (need to be in structured setting or told).  Pt not able to identify strengths when prompted.  Provided more anger management sheets per request.  Also provided journal for pt to work on.   Pt unable to think of journal promts to write on.  Provided suggestions during brainstorming activity.  Other peers provided positive feedback and encouragement to pt.  Suggested pt write down positive affirmations during weekend.  Pt stated that 20 affirmations were \"too many\" to provide.  Indicated she could set a goal of writing 5 a day and she could complete when therapist returns from the weekend. Pt visible in dayroom.      12/08/23 1100   Activity/Group Checklist   Group Community meeting   Attendance Attended   Attendance Duration (min) 31-45   Interactions Interacted appropriately   Affect/Mood Appropriate   Goals Achieved Identified feelings;Identified relapse prevention strategies;Discussed coping strategies;Able to listen to others;Able to engage in interactions;Able to reflect/comment on own behavior;Verbalized increased hopefulness;Able to manage/cope with feelings;Able to self-disclose;Able to recieve feedback         "

## 2023-12-08 NOTE — NURSING NOTE
Patient is visible on the unit and social with peers. Patient endorses anxiety and depression. Denies SI/HI, A/VH. Patient is compliant with medications and cooperative with treatment. Patient was emotional this AM and required PRN Atarax for anxiety (see note.) Patient attended all groups. Appetite is good.

## 2023-12-08 NOTE — NURSING NOTE
Pt guarded at times sitting in milieu. Interacts with peers. Medication compliant. Appetite good. Denies SI.

## 2023-12-08 NOTE — NURSING NOTE
Patient requests to talk to this nurse. Patient presents sobbing with her heads in her hands. Patient states that it is so boring here and there aren't many groups. She reports that its not good for people to sit around all day and do nothing. Patient is upset that she will be here until after the holidays. Patient did try to read a book but was too upset to focus. Patient was offered and accepted Atarax 50 MG PO. Will reassess.

## 2023-12-08 NOTE — NURSING NOTE
The patient voiced that she is having racing thoughts and unable to sleep. Patient scored a minaya of 18. She was given Atarax 50 mg at 0318. Continue to monitor

## 2023-12-08 NOTE — NURSING NOTE
Pt cooperative spoke with her dad and he is bringing her personal care items tomorrow. Worried about her cat. Present in milieu interacts with peers and colors. Appetite good. Medication compliant denies SI.

## 2023-12-08 NOTE — PLAN OF CARE
Problem: Ineffective Coping  Goal: Identifies ineffective coping skills  Outcome: Progressing  Goal: Identifies healthy coping skills  Outcome: Progressing  Goal: Demonstrates healthy coping skills  Outcome: Progressing  Goal: Participates in unit activities  Description: Interventions:  - Provide therapeutic environment   - Provide required programming   - Redirect inappropriate behaviors   Outcome: Progressing  Goal: Patient/Family participate in treatment and DC plans  Description: Interventions:  - Provide therapeutic environment  Outcome: Progressing  Goal: Patient/Family verbalizes awareness of resources  Outcome: Progressing  Goal: Understands least restrictive measures  Description: Interventions:  - Utilize least restrictive behavior  Outcome: Progressing  Goal: Free from restraint events  Description: - Utilize least restrictive measures   - Provide behavioral interventions   - Redirect inappropriate behaviors   Outcome: Progressing     Problem: Risk for Self Injury/Neglect  Goal: Treatment Goal: Remain safe during length of stay, learn and adopt new coping skills, and be free of self-injurious ideation, impulses and acts at the time of discharge  Outcome: Progressing  Goal: Verbalize thoughts and feelings  Description: Interventions:  - Assess and re-assess patient's lethality and potential for self-injury  - Engage patient in 1:1 interactions, daily, for a minimum of 15 minutes  - Encourage patient to express feelings, fears, frustrations, hopes  - Establish rapport/trust with patient   Outcome: Progressing  Goal: Refrain from harming self  Description: Interventions:  - Monitor patient closely, per order  - Develop a trusting relationship  - Supervise medication ingestion, monitor effects and side effects   Outcome: Progressing  Goal: Attend and participate in unit activities, including therapeutic, recreational, and educational groups  Description: Interventions:  - Provide therapeutic and  educational activities daily, encourage attendance and participation, and document same in the medical record  - Obtain collateral information, encourage visitation and family involvement in care   Outcome: Progressing  Goal: Recognize maladaptive responses and adopt new coping mechanisms  Outcome: Progressing  Goal: Complete daily ADLs, including personal hygiene independently, as able  Description: Interventions:  - Observe, teach, and assist patient with ADLS  - Monitor and promote a balance of rest/activity, with adequate nutrition and elimination  Outcome: Progressing     Problem: Depression  Goal: Treatment Goal: Demonstrate behavioral control of depressive symptoms, verbalize feelings of improved mood/affect, and adopt new coping skills prior to discharge  Outcome: Progressing  Goal: Verbalize thoughts and feelings  Description: Interventions:  - Assess and re-assess patient's level of risk   - Engage patient in 1:1 interactions, daily, for a minimum of 15 minutes   - Encourage patient to express feelings, fears, frustrations, hopes   Outcome: Progressing  Goal: Refrain from harming self  Description: Interventions:  - Monitor patient closely, per order   - Supervise medication ingestion, monitor effects and side effects   Outcome: Progressing  Goal: Refrain from isolation  Description: Interventions:  - Develop a trusting relationship   - Encourage socialization   Outcome: Progressing  Goal: Refrain from self-neglect  Outcome: Progressing  Goal: Attend and participate in unit activities, including therapeutic, recreational, and educational groups  Description: Interventions:  - Provide therapeutic and educational activities daily, encourage attendance and participation, and document same in the medical record   Outcome: Progressing  Goal: Complete daily ADLs, including personal hygiene independently, as able  Description: Interventions:  - Observe, teach, and assist patient with ADLS  -  Monitor and promote  a balance of rest/activity, with adequate nutrition and elimination   Outcome: Progressing     Problem: Anxiety  Goal: Anxiety is at manageable level  Description: Interventions:  - Assess and monitor patient's anxiety level.   - Monitor for signs and symptoms (heart palpitations, chest pain, shortness of breath, headaches, nausea, feeling jumpy, restlessness, irritable, apprehensive).   - Collaborate with interdisciplinary team and initiate plan and interventions as ordered.  - [place] patient to unit/surroundings  - Explain treatment plan  - Encourage participation in care  - Encourage verbalization of concerns/fears  - Identify coping mechanisms  - Assist in developing anxiety-reducing skills  - Administer/offer alternative therapies  - Limit or eliminate stimulants  Outcome: Progressing     Problem: Risk for Violence/Aggression Toward Others  Goal: Treatment Goal: Refrain from acts of violence/aggression during length of stay, and demonstrate improved impulse control at the time of discharge  Outcome: Progressing  Goal: Verbalize thoughts and feelings  Description: Interventions:  - Assess and re-assess patient's level of risk, every waking shift  - Engage patient in 1:1 interactions, daily, for a minimum of 15 minutes   - Allow patient to express feelings and frustrations in a safe and non-threatening manner   - Establish rapport/trust with patient   Outcome: Progressing  Goal: Refrain from harming others  Outcome: Progressing  Goal: Refrain from destructive acts on the environment or property  Outcome: Progressing  Goal: Control angry outbursts  Description: Interventions:  - Monitor patient closely, per order  - Ensure early verbal de-escalation  - Monitor prn medication needs  - Set reasonable/therapeutic limits, outline behavioral expectations, and consequences   - Provide a non-threatening milieu, utilizing the least restrictive interventions   Outcome: Progressing  Goal: Attend and participate in unit  activities, including therapeutic, recreational, and educational groups  Description: Interventions:  - Provide therapeutic and educational activities daily, encourage attendance and participation, and document same in the medical record   Outcome: Progressing  Goal: Identify appropriate positive anger management techniques  Description: Interventions:  - Offer anger management and coping skills groups   - Staff will provide positive feedback for appropriate anger control  Outcome: Progressing

## 2023-12-08 NOTE — CASE MANAGEMENT
CM spoke with the patient, she reports feeling bored here and is upset that she will not be with her father for Helen. Advised that we are looking into arranging a visit with her and her father.

## 2023-12-08 NOTE — PROGRESS NOTES
12/08/23 0823   Team Meeting   Meeting Type Daily Rounds   Initial Conference Date 12/08/23   Next Conference Date 12/11/23   Team Members Present   Team Members Present Physician;Nurse;;;Occupational Therapist   Physician Team Member Dr Smith, SHEFALI Lovell   Nursing Team Member Reyes   Care Management Team Member Atiya   Social Work Team Member Dalia TALBOT Team Member Yeison   Patient/Family Present   Patient Present No   Patient's Family Present No     Reports racing thoughts and unable to sleep, atarax given at 0318, med compliant, coumadin help last night due to increased INR, no outbursts, preoccupied with wanting a visit with her father on Christmas. Discharge pending.

## 2023-12-08 NOTE — PROGRESS NOTES
"  Progress Note - Behavioral Health   Blanca Mason 53 y.o. female MRN: [id number]  Unit/Bed#: [facility] 651-02 Encounter: [id number]       Patient was visited on unit for continuing care; chart reviewed and discussed with multidisciplinary treatment team. On approach, the patient was calm and cooperative.  She noted that she got upset earlier this morning as one of the groups were shorter and was feeling \"bored\".  As per nursing report: \"Patient presents sobbing with her heads in her hands. Patient states that it is so boring here and there aren't many groups. She reports that its not good for people to sit around all day and do nothing. Patient is upset that she will be here until after the holidays. Patient did try to read a book but was too upset to focus. Patient was offered and accepted Atarax 50 MG PO.\"     She reported feeling upset about being in the hospital and not celebrating Helen with her father but at the same time feels that she should get better and \"show others\" that she is able to improve.  Denied any changes in appetite, and energy level.  Reported interrupted sleep last night due to anxiety but reportedly slept well after taking the Atarax as needed.  Denied A/VH currently. Denied SI/HI, intent or plan upon direct inquiry at this time.    Patient continues to be visible in the milieu and interacts with staff and peers.continues to have poor frustration tolerance while her needs not met immediately.  No reports of aggression or self-injurious behavior on unit.  Received Atarax 50 mg at 0318 and 0839 today for anxiety.    Patient accepted all offered medications and reported feeling better. No adverse effects of medications noted or reported. Given the poor response to Latuda, history of noncompliance and to avoid polypharmacy, Latuda and Haldol Dec are being cross titrated to Invega and will consider SNOWDEN Invega Sustenna upon further dose adjustments.  Trileptal level ordered on 12/5/23 was 8 " (results came back on 12/8/23) and Trileptal is being uptitrated to 450 mg BID; level to be checked on 12/12/23; doses to be adjusted as indicated.  The patient benefits from referral to [facility] given the multiple recent hospitalizations and suicide attempts and inability to stay safe and functional in the community despite being connected to the ACT services.    Current Mental Status Evaluation:  Appearance and attitude: appeared as stated age, casually dressed, tattooed, wearing eyeglasses, with good hygiene  Eye contact: good  Motor Function: within normal limits, intact gait, No PMA/PMR  Gait/station: normal gait/station and normal balance  Speech: normal for rate, rhythm, volume, latency, amount  Language: No overt abnormality  Mood/affect: depressed, anxious / Affect was constricted but reactive, mood congruent  Thought Processes: sequential and goal-directed, concrete  Thought content: denied suicidal ideations or homicidal ideations, no overt delusions elicited, ruminating thoughts  Associations: concrete associations  Perceptual disturbances: denies Auditory/Visual/Tactile Hallucinations  Orientation: oriented to time, person, place and to the situational context  Cognitive Function: intact  Memory: recent and remote memory grossly intact  Intellect: unable to assess  Fund of knowledge: aware of current events, aware of past history, and vocabulary average  Impulse control: impulsive at times  Insight/judgment: limited/limited    Vital signs in last 24 hours:    Temp:  [97.1 °F (36.2 °C)-97.6 °F (36.4 °C)] 97.1 °F (36.2 °C)  HR:  [71-90] 78  Resp:  [17-18] 17  BP: (112-141)/(57-81) 141/81    Laboratory results: I have personally reviewed all pertinent laboratory/tests results    Results from the past 24 hours:   Recent Results (from the past 24 hour(s))   Protime-INR    Collection Time: 12/08/23  5:10 AM   Result Value Ref Range    Protime 37.1 (H) 11.6 - 14.5 seconds    INR 3.72 (H) 0.84 - 1.19       Progress  Toward Goals: making slow improvement    Assessment:  Principal Problem:    Schizoaffective disorder, bipolar type (HCC)  Active Problems:    Borderline personality disorder (HCC)    Benign essential hypertension    Edema    Hypothyroidism    MAG (obstructive sleep apnea)    Overactive bladder    Sjogren's syndrome (HCC)    Medical clearance for psychiatric admission    Anxiety    History of pulmonary embolism    Ingrown toenail        Plan:  - f/u [facility] recs regarding the medical problems  - Check trough Trileptal level on 12/12/2023  - Continue medication titration and treatment plan; adjust medication to optimize treatment response and as clinically indicated.     Scheduled medications:  Current Facility-Administered Medications   Medication Dose Route Frequency Provider Last Rate    aluminum-magnesium hydroxide-simethicone  30 mL Oral Q4H PRN JHONATAN Fields      atoMOXetine  40 mg Oral Daily Anatoly Prajapati, DO      buPROPion  450 mg Oral Daily Anatoly Prajapati, DO      cloNIDine  0.1 mg Oral Q12H [facility] Anatoly Prajapati, DO      cyanocobalamin  1,000 mcg Oral Daily Dianna Mcrae, LAURANP      cyanocobalamin  1,000 mcg Intramuscular Q30 Days Dianna Mcrae, LAURANP      ergocalciferol  50,000 Units Oral Weekly Dianna Mcrae, JHONATAN      furosemide  20 mg Oral Daily Wendy Anderson, LAURANP      hydrOXYzine HCL  25 mg Oral Q6H PRN Max 4/day Virginia Mccullough, CRNP      hydrOXYzine HCL  50 mg Oral Q6H PRN Max 4/day Virginia Mccullough, CRNP      ibuprofen  200 mg Oral Q6H PRN Virginia Mccullough, CRNP      ibuprofen  400 mg Oral Q6H PRN Virginia Mccullough, CRNP      ibuprofen  600 mg Oral Q8H PRN Virginia Mccullough, CRNP      levothyroxine  125 mcg Oral Early Morning Wendy Anderson, CRNP      loperamide  2 mg Oral Q4H PRN Anatoly Prajapati, DO      LORazepam  1 mg Intramuscular Q6H PRN Max 3/day Virginia Mccullough, CRNP      LORazepam  1 mg Oral Q6H PRN Max 3/day Virginia Mccullough, CRNP      losartan   100 mg Oral Daily JHONATAN Fields      lurasidone  60 mg Oral Daily With Dinner Frederick Smith MD      melatonin  3 mg Oral HS Frederick Smith MD      naltrexone  50 mg Oral Daily Anatoly Prajapati, DO      OLANZapine  10 mg Oral Q6H PRN Frederick Smith MD      Or    OLANZapine  10 mg Intramuscular Q6H PRN Frederick Smith MD      OLANZapine  5 mg Oral Q6H PRN Frederick Smith MD      Or    OLANZapine  5 mg Intramuscular Q6H PRN Frederick Smith MD      OLANZapine  2.5 mg Oral Q3H PRN Frederick Smith MD      OXcarbazepine  300 mg Oral Q12H TASHA Anatoly Prajapati,       oxybutynin  5 mg Oral BID JHONATAN Fields      paliperidone  6 mg Oral Daily Frederick Smith MD      pantoprazole  40 mg Oral Early Morning JHONATAN Fields      polyethylene glycol  17 g Oral Daily PRN JHONATAN Anne      saccharomyces boulardii  250 mg Oral BID Anatoly Prajapati, DO      senna-docusate sodium  1 tablet Oral Daily PRN JHONATAN Anne      topiramate  200 mg Oral BID Anatoly Prajapati, DO      traZODone  50 mg Oral HS PRN JHONATAN Anne          PRN:    aluminum-magnesium hydroxide-simethicone    hydrOXYzine HCL    hydrOXYzine HCL    ibuprofen    ibuprofen    ibuprofen    loperamide    LORazepam    LORazepam    OLANZapine **OR** OLANZapine    OLANZapine **OR** OLANZapine    OLANZapine    polyethylene glycol    senna-docusate sodium    traZODone    - Observation: routine    - VS: as per unit protocol  - Diet: Regular diet  - Psychoeducation (benefits and potential risks) discussed, importance of compliance with the psychiatric treatment reiterated, and the patient verbalized understanding of the matter  - Encourage group attendance and milieu therapy    - The pt was educated and agreed to verbalize any suicidal thoughts, frustrations or concerns to the nursing staff, immediately.    - Dispo: TBD       Next of Kin  Extended Emergency Contact Information  Primary Emergency Contact:  Luigi Mason  Address: [address] [place] [address] [place]  Home Phone: [phone]  Mobile Phone: [phone]  Relation: Father  Secondary Emergency Contact: Leonor Ji  Address: [address] [place] [address] [facility] [place]  Home Phone: [phone]  Mobile Phone: [phone]  Relation: Friend      Counseling / Coordination of Care  Patient's progress discussed with staff in treatment team meeting.  Medications, treatment progress and treatment plan reviewed with patient.  Medication changes discussed with patient.  Medication education provided to patient.  Coping with stress and anger reviewed with patient.  Coping mechanisms reviewed with patient.  Supportive therapy provided to patient.  Cognitive techniques utilized during the session.  Reassurance and supportive therapy provided.  Reoriented to reality and reassured.  Encouraged participation in milieu and group therapy on the unit.  Crisis/safety plan discussed with patient.     Frederick Smith MD  Attending Psychiatrist   [facility]

## 2023-12-09 LAB
INR PPP: 1.99 (ref 0.84–1.19)
PROTHROMBIN TIME: 22.9 SECONDS (ref 11.6–14.5)

## 2023-12-09 PROCEDURE — 99232 SBSQ HOSP IP/OBS MODERATE 35: CPT

## 2023-12-09 PROCEDURE — 85610 PROTHROMBIN TIME: CPT | Performed by: NURSE PRACTITIONER

## 2023-12-09 RX ORDER — WARFARIN SODIUM 5 MG/1
7.5 TABLET ORAL
Status: COMPLETED | OUTPATIENT
Start: 2023-12-09 | End: 2023-12-11

## 2023-12-09 RX ADMIN — PANTOPRAZOLE SODIUM 40 MG: 40 TABLET, DELAYED RELEASE ORAL at 05:22

## 2023-12-09 RX ADMIN — HYDROXYZINE HYDROCHLORIDE 50 MG: 50 TABLET, FILM COATED ORAL at 08:10

## 2023-12-09 RX ADMIN — TOPIRAMATE 200 MG: 100 TABLET, FILM COATED ORAL at 08:09

## 2023-12-09 RX ADMIN — Medication 250 MG: at 08:09

## 2023-12-09 RX ADMIN — OXYBUTYNIN CHLORIDE 5 MG: 5 TABLET ORAL at 08:09

## 2023-12-09 RX ADMIN — LURASIDONE HYDROCHLORIDE 60 MG: 40 TABLET, COATED ORAL at 17:29

## 2023-12-09 RX ADMIN — NALTREXONE HYDROCHLORIDE 50 MG: 50 TABLET, FILM COATED ORAL at 08:09

## 2023-12-09 RX ADMIN — WARFARIN SODIUM 7.5 MG: 5 TABLET ORAL at 17:33

## 2023-12-09 RX ADMIN — MELATONIN TAB 3 MG 3 MG: 3 TAB at 21:40

## 2023-12-09 RX ADMIN — CLONIDINE HYDROCHLORIDE 0.1 MG: 0.1 TABLET ORAL at 21:40

## 2023-12-09 RX ADMIN — Medication 250 MG: at 17:29

## 2023-12-09 RX ADMIN — OXCARBAZEPINE 450 MG: 150 TABLET, FILM COATED ORAL at 08:09

## 2023-12-09 RX ADMIN — LEVOTHYROXINE SODIUM 125 MCG: 125 TABLET ORAL at 05:22

## 2023-12-09 RX ADMIN — CLONIDINE HYDROCHLORIDE 0.1 MG: 0.1 TABLET ORAL at 08:09

## 2023-12-09 RX ADMIN — OXYBUTYNIN CHLORIDE 5 MG: 5 TABLET ORAL at 17:32

## 2023-12-09 RX ADMIN — LOSARTAN POTASSIUM 100 MG: 50 TABLET, FILM COATED ORAL at 08:09

## 2023-12-09 RX ADMIN — OXCARBAZEPINE 450 MG: 150 TABLET, FILM COATED ORAL at 21:40

## 2023-12-09 RX ADMIN — CYANOCOBALAMIN TAB 1000 MCG 1000 MCG: 1000 TAB at 08:09

## 2023-12-09 RX ADMIN — TOPIRAMATE 200 MG: 100 TABLET, FILM COATED ORAL at 17:29

## 2023-12-09 RX ADMIN — PALIPERIDONE 6 MG: 6 TABLET, EXTENDED RELEASE ORAL at 08:09

## 2023-12-09 RX ADMIN — FUROSEMIDE 20 MG: 20 TABLET ORAL at 08:09

## 2023-12-09 RX ADMIN — BUPROPION HYDROCHLORIDE 450 MG: 300 TABLET, EXTENDED RELEASE ORAL at 08:09

## 2023-12-09 RX ADMIN — ATOMOXETINE 40 MG: 40 CAPSULE ORAL at 08:09

## 2023-12-09 NOTE — NURSING NOTE
Patient tearful this morning saying she is tired of waking up during the night. Patient reports noise from floors being cleaned and patient/staff talking in hallway. Reports being awake at approximately 0500 and unable to go back to sleep.

## 2023-12-09 NOTE — NURSING NOTE
"Pt is calm and cooperative. Pleasant on approach. Denies SI/HI. Denies psychosis. Endorses mild to moderate depression and anxiety at times. Describes feeling \"homesick\", and regrettable for events leading up to admission. Pt feels guilt for leaving pet cat. Significant staff 1:1 time provided at Pt request. Reassurance provided. Pt encouraged to attempt to change negative thoughts to positive as a coping skill. Pt somewhat receptive. Utilizes art as a coping skills. No acute behaviors or needs. Q7's maintained. Will cont to provide support as needed.   "

## 2023-12-09 NOTE — NURSING NOTE
"PRN Atarax effective. Pt describes improved mood. States, \"I do feel a lot better now, and was able to go to art group which helps.\" Staff support provided.   "

## 2023-12-09 NOTE — PROGRESS NOTES
"Progress Note - Behavioral Health   Blanca Mason 53 y.o. female MRN: [id number]  Unit/Bed#: [facility] 651-02 Encounter: [id number]      Behavior over the last 24 hours:  unchanged     Subjective: I saw Blanca for follow up and continuation of care. I have reviewed the chart and discussed progress with the nursing staff. Patient is at times irritable, anxious and labile. She was slamming doors yesterday. She is cooperative, visible and social. Reports mild depression. She is medication and meal compliant. She is attending groups. She remains in good behavorial control. PRNs in the last 24 hours include: [place].    On assessment, Blanca is seen in the activity room. She is \"better\" today due improved sleep. She rates depression 4/10 today, denies SI, HI, plan, intent or self-injurious behaviors. She is \"proud\" of herself for the way she resolved a conflict with the RN. She wanted to walk away from a discussion where she was being directed for slamming doors and has improved insight in her behavior. She is working on anger management worksheets and expresses remorse for events leading to admission. She becomes tearful about missing the holidays with her father and feels guilty about having others take care of her cat. Blanca does not voice any paranoia or delusions, denies A/VH and does not appear to be responding to internal stimuli.         Psychiatric Review of Systems:  Sleep: improved  Appetite: normal  Medication side effects: No   ROS: no complaints, all other systems are negative     Mental Status Evaluation:     Appearance:  age appropriate, casually dressed, dressed appropriately, adequate grooming, overweight, no distress   Behavior:  cooperative, calm, good eye contact   Speech:  normal rate, normal volume, normal pitch   Mood:  depressed   Affect:  Slightly brighter   Thought Process:  goal directed, perseverative   Thought Content:  no overt delusions, ruminating thoughts, no overt paranoia noted on exam "   Perceptual Disturbances: no auditory hallucinations, no visual hallucinations, does not appear responding to internal stimuli   Risk Potential: Suicidal ideation - None  Homicidal ideation - None  Potential for aggression - No   Memory:  recent and remote memory grossly intact   Consciousness:  alert and awake   Attention: attention span and concentration are age appropriate   Insight:  Fair, improving   Judgment: fair   Gait/Station: normal gait/station   Motor Activity: no abnormal movements      Progress Toward Goals: progressing slowly, depression is slowly improving. No significant changes in behaviors or clinical status over the last 24 hours.     Discharge Disposition: TBD referral to [facility]    Assessment/Plan   Principal Problem:    Schizoaffective disorder, bipolar type ([facility])  Active Problems:    Benign essential hypertension    Edema    Hypothyroidism    MAG (obstructive sleep apnea)    Overactive bladder    Sjogren's syndrome ([facility])    Medical clearance for psychiatric admission    Anxiety    Borderline personality disorder ([facility])    History of pulmonary embolism    Ingrown toenail      Treatment Plan:  Continue with group therapy, individual therapy and goals for admission  Behavioral Health checks every 7 minutes for safety  Observe progress over weekend  SLIM- following PT/INR  Consultation to Podiatry- Xray performed to assess for fracture of right second toe. Postop shoe for right foot with weightbearing.  Trileptal level, PT INR ordered for Tuesday 12/12/23  Latuda and Haldol Dec are being cross titrated to Invega/ Primary treatment considering SNOWDEN Invega Sustenna.   No changes today, continue current medications:      Current Facility-Administered Medications   Medication Dose Route Frequency Provider Last Rate    aluminum-magnesium hydroxide-simethicone  30 mL Oral Q4H PRN JHONATAN Fields      atoMOXetine  40 mg Oral Daily Anatoly Prajapati DO      buPROPion  450 mg Oral Daily Anatoly  Jennifer Prajapati, DO      cloNIDine  0.1 mg Oral Q12H TASHA Anatoly Prajapati, DO      cyanocobalamin  1,000 mcg Oral Daily Dianna Mcrae, CRNP      cyanocobalamin  1,000 mcg Intramuscular Q30 Days Dianna Mcrae, CRNP      ergocalciferol  50,000 Units Oral Weekly Dianna Mcrae, CRNP      furosemide  20 mg Oral Daily Wendy Anderson, CRNP      hydrOXYzine HCL  25 mg Oral Q6H PRN Max 4/day Virginia Mccullough, CRNP      hydrOXYzine HCL  50 mg Oral Q6H PRN Max 4/day Virginia Mccullough, CRNP      ibuprofen  200 mg Oral Q6H PRN Virginia Mccullough, CRNP      ibuprofen  400 mg Oral Q6H PRN Virginia Mccullough, CRNP      ibuprofen  600 mg Oral Q8H PRN Virginia Mccullough, CRNP      levothyroxine  125 mcg Oral Early Morning Wendy Anderson, CRNP      loperamide  2 mg Oral Q4H PRN Anatoly Prajapati, DO      LORazepam  1 mg Intramuscular Q6H PRN Max 3/day Virginia Mccullough, CRNP      LORazepam  1 mg Oral Q6H PRN Max 3/day Virginia Mccullough, CRNP      losartan  100 mg Oral Daily Wendy Anderson, CRNP      lurasidone  60 mg Oral Daily With Dinner Frederick Smith MD      melatonin  3 mg Oral HS Frederick Smith MD      naltrexone  50 mg Oral Daily Anatoly Prajapati, DO      OLANZapine  10 mg Oral Q6H PRN Frederick Smith MD      Or    OLANZapine  10 mg Intramuscular Q6H PRN Frederick Smith MD      OLANZapine  5 mg Oral Q6H PRN Frederick Smith MD      Or    OLANZapine  5 mg Intramuscular Q6H PRN Frederick Smith MD      OLANZapine  2.5 mg Oral Q3H PRN Frederick Smith MD      OXcarbazepine  450 mg Oral Q12H TASHA Frederick Smith MD      oxybutynin  5 mg Oral BID Wendy Anderson, CRNP      paliperidone  6 mg Oral Daily Frederick Smith MD      pantoprazole  40 mg Oral Early Morning Wendy Anderson, CRNP      polyethylene glycol  17 g Oral Daily PRN Virginia Mccullough, CRNP      saccharomyces boulardii  250 mg Oral BID DO troy Martel-docusate sodium  1 tablet Oral Daily PRN JHONATAN Anne       "topiramate  200 mg Oral BID Anatoly Prajapati,       traZODone  50 mg Oral HS PRN JHONATAN Anne      warfarin  7.5 mg Oral Daily (warfarin) JHONATAN Berg         Vitals:  Vitals:    12/10/23 0722   BP: 132/73   Pulse: 80   Resp: 18   Temp: (!) 97 °F (36.1 °C)   SpO2: 96%       Laboratory Results:  I have personally reviewed all pertinent laboratory/tests results.  Labs in last 72 hours: No results for input(s): \"WBC\", \"RBC\", \"HGB\", \"HCT\", \"PLT\", \"RDW\", \"TOTANEUTABS\", \"NEUTROABS\", \"SODIUM\", \"K\", \"CL\", \"CO2\", \"BUN\", \"CREATININE\", \"GLUC\", \"GLUF\", \"CALCIUM\", \"AST\", \"ALT\", \"ALKPHOS\", \"TP\", \"ALB\", \"TBILI\", \"CHOLESTEROL\", \"HDL\", \"TRIG\", \"LDLCALC\", \"VALPROICTOT\", \"CARBAMAZEPIN\", \"LITHIUM\", \"AMMONIA\", \"[id number]\", \"FREET4\", \"T3FREE\", \"PREGTESTUR\", \"PREGSERUM\", \"HCG\", \"HCGQUANT\", \"RPR\" in the last 72 hours.      Risks / Benefits of Treatment:    Risks, benefits, and possible side effects of medications explained to patient. Patient has limited understanding of risks and benefits of treatment at this time, but agrees to take medications as prescribed.    Counseling / Coordination of Care:    Total floor / unit time spent today 25 minutes. Greater than 50% of total time was spent with the patient and / or family counseling and / or coordination of care. A description of the counseling / coordination of care:   Patient's presentation on admission and proposed treatment plan discussed with treatment team.  Diagnosis, medication changes and treatment plan reviewed with patient.  Supportive therapy provided to patient.    This note has been constructed using a voice recognition system. There may be translation, syntax, or grammatical errors. If you have any questions, please contact the dictating author.  JHONATAN Melchor  12/10/23    "

## 2023-12-09 NOTE — PLAN OF CARE
Problem: Risk for Self Injury/Neglect  Goal: Treatment Goal: Remain safe during length of stay, learn and adopt new coping skills, and be free of self-injurious ideation, impulses and acts at the time of discharge  Outcome: Progressing  Goal: Verbalize thoughts and feelings  Description: Interventions:  - Assess and re-assess patient's lethality and potential for self-injury  - Engage patient in 1:1 interactions, daily, for a minimum of 15 minutes  - Encourage patient to express feelings, fears, frustrations, hopes  - Establish rapport/trust with patient   Outcome: Progressing  Goal: Refrain from harming self  Description: Interventions:  - Monitor patient closely, per order  - Develop a trusting relationship  - Supervise medication ingestion, monitor effects and side effects   Outcome: Progressing  Goal: Attend and participate in unit activities, including therapeutic, recreational, and educational groups  Description: Interventions:  - Provide therapeutic and educational activities daily, encourage attendance and participation, and document same in the medical record  - Obtain collateral information, encourage visitation and family involvement in care   Outcome: Progressing  Goal: Recognize maladaptive responses and adopt new coping mechanisms  Outcome: Progressing  Goal: Complete daily ADLs, including personal hygiene independently, as able  Description: Interventions:  - Observe, teach, and assist patient with ADLS  - Monitor and promote a balance of rest/activity, with adequate nutrition and elimination  Outcome: Progressing

## 2023-12-09 NOTE — PROGRESS NOTES
"Progress Note - Behavioral Health   Blanca Mason 53 y.o. female MRN: [id number]  Unit/Bed#: [facility] 651-02 Encounter: [id number]      Behavior over the last 24 hours:  unchanged    Subjective: I saw Blanca for follow up and continuation of care. I have reviewed the chart and discussed progress with the nursing staff. Patient is at times irritable or anxious. She is cooperative, visible and social. She is medication and meal compliant. She is attending groups. She remains in good behavorial control. PRNs in the last 24 hours include: Atarax 50 mg.    On assessment, Blanca is seen in the activity room. She is \"frustrated\" today due to poor sleep. She complains of a loud peer keeping her awake, which is why she required Atarax this morning because of being upset by this. She is feeling \"better\" but becomes tearful due to disposition of referral to [facility] and missing the holidays. She verbalizes this admission has really made her reflect on her suicide attempt and now \"I will never do it again\" as she is goal-oriented to remain out of the hospital. Contrarily, patient states being hospitalized is making her worse and because there are not enough group programming scheduled. Patient was directed to work on individual therapy, which she is completing anger management worksheets. She had a visit from her ACT team and therapist today. She denies SI, HI, plan, intent or self-injurious behaviors. Blanca does not voice any paranoia or delusions, denies A/VH and does not appear to be responding to internal stimuli.       Psychiatric Review of Systems:  Sleep: decreased  Appetite: normal  Medication side effects: No   ROS: no complaints, all other systems are negative    Mental Status Evaluation:    Appearance:  age appropriate, casually dressed, dressed appropriately, adequate grooming, overweight, no distress   Behavior:  cooperative, calm, good eye contact   Speech:  normal rate, normal volume, normal pitch   Mood:  \"Frustrated\"  "   Affect:  mood-congruent   Thought Process:  goal directed, perseverative   Thought Content:  no overt delusions, ruminating thoughts, no overt paranoia noted on exam   Perceptual Disturbances: no auditory hallucinations, no visual hallucinations, does not appear responding to internal stimuli   Risk Potential: Suicidal ideation - None  Homicidal ideation - None  Potential for aggression - No   Memory:  recent and remote memory grossly intact   Consciousness:  alert and awake   Attention: attention span and concentration are age appropriate   Insight:  poor   Judgment: fair   Gait/Station: normal gait/station   Motor Activity: no abnormal movements     Progress Toward Goals: no significant improvement today, depression is slowly improving. No significant changes in behaviors or clinical status over the last 24 hours.    Discharge Disposition: TBD referral to [facility]    Assessment/Plan   Principal Problem:    Schizoaffective disorder, bipolar type ([facility])  Active Problems:    Benign essential hypertension    Edema    Hypothyroidism    MAG (obstructive sleep apnea)    Overactive bladder    Sjogren's syndrome ([facility])    Medical clearance for psychiatric admission    Anxiety    Borderline personality disorder ([facility])    History of pulmonary embolism    Ingrown toenail      Treatment Plan:  Continue with group therapy, individual therapy and goals for admission  Behavioral Health checks every 7 minutes for safety  Observe progress over weekend   Latuda and Haldol Dec are being cross titrated to Invega/ Primary treatment considering SNOWDEN Invega Sustenna.   SLIM- Protime-INR today  Trileptal level ordered for 12/12/23  No changes, continue current medications:      Current Facility-Administered Medications   Medication Dose Route Frequency Provider Last Rate    aluminum-magnesium hydroxide-simethicone  30 mL Oral Q4H PRN JHONATAN Fields      atoMOXetine  40 mg Oral Daily Anatoly Prajapati DO      buPROPion  450 mg Oral  Daily Anatoly Prajapati, DO      cloNIDine  0.1 mg Oral Q12H TASHA Anatoly Prajapati, DO      cyanocobalamin  1,000 mcg Oral Daily Dianna Mcrae, CRNP      cyanocobalamin  1,000 mcg Intramuscular Q30 Days Dianna Mcrae, CRNP      ergocalciferol  50,000 Units Oral Weekly Dianna Mcrae, CRNP      furosemide  20 mg Oral Daily Wendy Anderson, CRNP      hydrOXYzine HCL  25 mg Oral Q6H PRN Max 4/day Virginia Mccullough, CRNP      hydrOXYzine HCL  50 mg Oral Q6H PRN Max 4/day Virginia Mccullough, CRNP      ibuprofen  200 mg Oral Q6H PRN Virginia Mccullough, CRNP      ibuprofen  400 mg Oral Q6H PRN Virginia Mccullough, CRNP      ibuprofen  600 mg Oral Q8H PRN Virginia Mccullough, CRNP      levothyroxine  125 mcg Oral Early Morning Wendy Anderson, CRNP      loperamide  2 mg Oral Q4H PRN Anatoly Prajapati, DO      LORazepam  1 mg Intramuscular Q6H PRN Max 3/day Virginia Mccullough, CRNP      LORazepam  1 mg Oral Q6H PRN Max 3/day Virginia Mccullough, CRNP      losartan  100 mg Oral Daily Wendy Anderson, CRNP      lurasidone  60 mg Oral Daily With Dinner Frederick Smith MD      melatonin  3 mg Oral HS Frederick Smith MD      naltrexone  50 mg Oral Daily Anatoly Prajapati, DO      OLANZapine  10 mg Oral Q6H PRN Frederick Smith MD      Or    OLANZapine  10 mg Intramuscular Q6H PRN Frederick Smith MD      OLANZapine  5 mg Oral Q6H PRN Frederick Smith MD      Or    OLANZapine  5 mg Intramuscular Q6H PRN Frederick Smith MD      OLANZapine  2.5 mg Oral Q3H PRN Frederick Smith MD      OXcarbazepine  450 mg Oral Q12H [facility] Frederick Smith MD      oxybutynin  5 mg Oral BID Wendy Anderson, CRNP      paliperidone  6 mg Oral Daily Frederick Smith MD      pantoprazole  40 mg Oral Early Morning Wendy Anderson, CRNP      polyethylene glycol  17 g Oral Daily PRN Virginia Mccullough, CRNP      saccharomyces boulardii  250 mg Oral BID DO troy Martel-docusate sodium  1 tablet Oral Daily PRN JHNOATAN Anne    "   topiramate  200 mg Oral BID Anatoly Prajapati,       traZODone  50 mg Oral HS PRN JHONATAN Anne      warfarin  7.5 mg Oral Daily (warfarin) JHONATAN Berg         Vitals:  Vitals:    12/09/23 0733   BP: 147/76   Pulse: 87   Resp: 16   Temp: (!) 97 °F (36.1 °C)   SpO2: 96%       Laboratory Results:  I have personally reviewed all pertinent laboratory/tests results.  Labs in last 72 hours: No results for input(s): \"WBC\", \"RBC\", \"HGB\", \"HCT\", \"PLT\", \"RDW\", \"TOTANEUTABS\", \"NEUTROABS\", \"SODIUM\", \"K\", \"CL\", \"CO2\", \"BUN\", \"CREATININE\", \"GLUC\", \"GLUF\", \"CALCIUM\", \"AST\", \"ALT\", \"ALKPHOS\", \"TP\", \"ALB\", \"TBILI\", \"CHOLESTEROL\", \"HDL\", \"TRIG\", \"LDLCALC\", \"VALPROICTOT\", \"CARBAMAZEPIN\", \"LITHIUM\", \"AMMONIA\", \"[id number]\", \"FREET4\", \"T3FREE\", \"PREGTESTUR\", \"PREGSERUM\", \"HCG\", \"HCGQUANT\", \"RPR\" in the last 72 hours.  Coagulation Panel   Lab Results   Component Value Date    DDIMER >20.00 (H) 09/01/2023    PROTIME 22.9 (H) 12/09/2023    INR 1.99 (H) 12/09/2023    PTT 40 (H) 12/01/2023         Risks / Benefits of Treatment:    Risks, benefits, and possible side effects of medications explained to patient and patient verbalizes understanding and agreement for treatment.    Counseling / Coordination of Care:    Total floor / unit time spent today 25 minutes. Greater than 50% of total time was spent with the patient and / or family counseling and / or coordination of care. A description of the counseling / coordination of care:   Patient's presentation on admission and proposed treatment plan discussed with treatment team.  Diagnosis, medication changes and treatment plan reviewed with patient.  Supportive therapy provided to patient.    This note has been constructed using a voice recognition system. There may be translation, syntax, or grammatical errors. If you have any questions, please contact the dictating author.  JHONATAN Melchor  12/09/23    "

## 2023-12-09 NOTE — NURSING NOTE
"Pt complains of anxiety. Tearful on approach. Describes \"poor sleep\" over past two nights. Pt has irritable edge on description of \"issues\" on unit that keep her awake. Requests PRN medication. 50 mg Atarax administered w/ scheduled am meds. Will monitor for effectiveness and encourage use of coping skills.   "

## 2023-12-10 ENCOUNTER — APPOINTMENT (INPATIENT)
Dept: RADIOLOGY | Facility: HOSPITAL | Age: 53
DRG: 885 | End: 2023-12-10
Payer: MEDICARE

## 2023-12-10 PROCEDURE — 73630 X-RAY EXAM OF FOOT: CPT

## 2023-12-10 RX ADMIN — CYANOCOBALAMIN TAB 1000 MCG 1000 MCG: 1000 TAB at 08:22

## 2023-12-10 RX ADMIN — SENNOSIDES AND DOCUSATE SODIUM 1 TABLET: 8.6; 5 TABLET ORAL at 08:22

## 2023-12-10 RX ADMIN — Medication 250 MG: at 08:22

## 2023-12-10 RX ADMIN — CLONIDINE HYDROCHLORIDE 0.1 MG: 0.1 TABLET ORAL at 08:22

## 2023-12-10 RX ADMIN — OXYBUTYNIN CHLORIDE 5 MG: 5 TABLET ORAL at 08:22

## 2023-12-10 RX ADMIN — MELATONIN TAB 3 MG 3 MG: 3 TAB at 21:42

## 2023-12-10 RX ADMIN — Medication 250 MG: at 17:03

## 2023-12-10 RX ADMIN — PANTOPRAZOLE SODIUM 40 MG: 40 TABLET, DELAYED RELEASE ORAL at 05:57

## 2023-12-10 RX ADMIN — NALTREXONE HYDROCHLORIDE 50 MG: 50 TABLET, FILM COATED ORAL at 08:22

## 2023-12-10 RX ADMIN — FUROSEMIDE 20 MG: 20 TABLET ORAL at 08:22

## 2023-12-10 RX ADMIN — OXCARBAZEPINE 450 MG: 150 TABLET, FILM COATED ORAL at 08:22

## 2023-12-10 RX ADMIN — PALIPERIDONE 6 MG: 6 TABLET, EXTENDED RELEASE ORAL at 08:22

## 2023-12-10 RX ADMIN — TOPIRAMATE 200 MG: 100 TABLET, FILM COATED ORAL at 08:21

## 2023-12-10 RX ADMIN — TOPIRAMATE 200 MG: 100 TABLET, FILM COATED ORAL at 17:03

## 2023-12-10 RX ADMIN — OXYBUTYNIN CHLORIDE 5 MG: 5 TABLET ORAL at 17:05

## 2023-12-10 RX ADMIN — LOSARTAN POTASSIUM 100 MG: 50 TABLET, FILM COATED ORAL at 08:22

## 2023-12-10 RX ADMIN — WARFARIN SODIUM 7.5 MG: 5 TABLET ORAL at 17:03

## 2023-12-10 RX ADMIN — OXCARBAZEPINE 450 MG: 150 TABLET, FILM COATED ORAL at 21:42

## 2023-12-10 RX ADMIN — BUPROPION HYDROCHLORIDE 450 MG: 300 TABLET, EXTENDED RELEASE ORAL at 08:22

## 2023-12-10 RX ADMIN — LEVOTHYROXINE SODIUM 125 MCG: 125 TABLET ORAL at 05:57

## 2023-12-10 RX ADMIN — LURASIDONE HYDROCHLORIDE 60 MG: 40 TABLET, COATED ORAL at 17:03

## 2023-12-10 RX ADMIN — CLONIDINE HYDROCHLORIDE 0.1 MG: 0.1 TABLET ORAL at 21:42

## 2023-12-10 RX ADMIN — ATOMOXETINE 40 MG: 40 CAPSULE ORAL at 08:22

## 2023-12-10 NOTE — PLAN OF CARE
Problem: Ineffective Coping  Goal: Identifies ineffective coping skills  Outcome: Progressing  Goal: Identifies healthy coping skills  Outcome: Progressing  Goal: Demonstrates healthy coping skills  Outcome: Progressing  Goal: Participates in unit activities  Description: Interventions:  - Provide therapeutic environment   - Provide required programming   - Redirect inappropriate behaviors   Outcome: Progressing  Goal: Patient/Family participate in treatment and DC plans  Description: Interventions:  - Provide therapeutic environment  Outcome: Progressing  Goal: Patient/Family verbalizes awareness of resources  Outcome: Progressing  Goal: Understands least restrictive measures  Description: Interventions:  - Utilize least restrictive behavior  Outcome: Progressing  Goal: Free from restraint events  Description: - Utilize least restrictive measures   - Provide behavioral interventions   - Redirect inappropriate behaviors   Outcome: Progressing     Problem: Risk for Self Injury/Neglect  Goal: Treatment Goal: Remain safe during length of stay, learn and adopt new coping skills, and be free of self-injurious ideation, impulses and acts at the time of discharge  Outcome: Progressing  Goal: Verbalize thoughts and feelings  Description: Interventions:  - Assess and re-assess patient's lethality and potential for self-injury  - Engage patient in 1:1 interactions, daily, for a minimum of 15 minutes  - Encourage patient to express feelings, fears, frustrations, hopes  - Establish rapport/trust with patient   Outcome: Progressing  Goal: Refrain from harming self  Description: Interventions:  - Monitor patient closely, per order  - Develop a trusting relationship  - Supervise medication ingestion, monitor effects and side effects   Outcome: Progressing  Goal: Recognize maladaptive responses and adopt new coping mechanisms  Outcome: Progressing  Goal: Complete daily ADLs, including personal hygiene independently, as  able  Description: Interventions:  - Observe, teach, and assist patient with ADLS  - Monitor and promote a balance of rest/activity, with adequate nutrition and elimination  Outcome: Progressing     Problem: Depression  Goal: Treatment Goal: Demonstrate behavioral control of depressive symptoms, verbalize feelings of improved mood/affect, and adopt new coping skills prior to discharge  Outcome: Progressing  Goal: Verbalize thoughts and feelings  Description: Interventions:  - Assess and re-assess patient's level of risk   - Engage patient in 1:1 interactions, daily, for a minimum of 15 minutes   - Encourage patient to express feelings, fears, frustrations, hopes   Outcome: Progressing  Goal: Refrain from harming self  Description: Interventions:  - Monitor patient closely, per order   - Supervise medication ingestion, monitor effects and side effects   Outcome: Progressing  Goal: Refrain from isolation  Description: Interventions:  - Develop a trusting relationship   - Encourage socialization   Outcome: Progressing  Goal: Refrain from self-neglect  Outcome: Progressing     Problem: Anxiety  Goal: Anxiety is at manageable level  Description: Interventions:  - Assess and monitor patient's anxiety level.   - Monitor for signs and symptoms (heart palpitations, chest pain, shortness of breath, headaches, nausea, feeling jumpy, restlessness, irritable, apprehensive).   - Collaborate with interdisciplinary team and initiate plan and interventions as ordered.  - [place] patient to unit/surroundings  - Explain treatment plan  - Encourage participation in care  - Encourage verbalization of concerns/fears  - Identify coping mechanisms  - Assist in developing anxiety-reducing skills  - Administer/offer alternative therapies  - Limit or eliminate stimulants  Outcome: Progressing

## 2023-12-10 NOTE — CONSULTS
[facility] - Consultation    Patient Information:   Blanca Mason 53 y.o. female MRN: [id number]  Unit/Bed#: [facility] 651-02 Encounter: [id number]  PCP: JHONATAN Armando  Date of Admission:  12/1/2023  Date of Consultation: 12/10/23  Requesting Physician: Frederick Smith MD      ASSESSMENT:    Blanca Mason is a 53 y.o. female with:    Right second toe proximal phalanx closed fracture versus contusion, no dislocation or displacement  Right second hammertoe deformity, worsening  Right foot pain  Right hallux lateral nail border ingrown toenail status post partial nail avulsion, resolving    PLAN:    Patient was seen and evaluated bedside with all questions and concerns addressed  Ordered right foot x-rays to assess for a fracture of the second toe  Ordered postop shoe for right foot.  Patient is weightbearing as tolerated to the right foot in the postop shoe.  Patient can take ibuprofen 600 mg p.o. every 6 hours as needed pain  No indications for antibiotics or surgery from podiatry standpoint.  Right hallux ingrown toenail is resolving, no need to apply any dressing to the site  Rest of care per primary team.      SUBJECTIVE    History of Present Illness:    Blanca Mason is a 53 y.o. female with past medical history of hypertension, hypothyroidism, anxiety, pulmonary embolism, and bipolar who presents with right second toe pain.  Patient is unsure when this started, however states that it is worsening and rates the pain a 6/10 during ambulation.  Patient denies any trauma to the right foot, however, does state she cannot 100% confirm this.  Denies any pain to her right hallux ingrown toenail site.  Denies any other lower extremity complaints at this time.    Review of Systems:    12 point review of systems is negative unless otherwise specified in the above HPI.    Past Medical and Surgical History:     Past Medical History:   Diagnosis Date    Anxiety     Arthritis     oa cassandra knees     "Asthma     good control- no medications    Yan's esophagus     Bipolar affective disorder (HCC)     Cannabis abuse with unspecified 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HCC)     Chronic pain disorder     lumbar    COPD (chronic obstructive pulmonary disease) (HCC)     CPAP (continuous positive airway pressure) dependence     DDD (degenerative disc disease), lumbar 04/09/2015    Degenerative disc disease at L5-S1 level     Deliberate self-cutting     9/15/22per pt has not done recently-- does see a therapist and psychiatrist regularly    Depression 09/16/2008    Disease of thyroid gland     hypo    MARTINEZ (dyspnea on exertion)     per pt \"has had this with exertion and not new\"    Drug overdose 10/28/2008    suicide attempt and again drug overdose 7/2022-- [facility] floor and than transferred to [facility]    Dysphagia     Dyspnea     Edema     BLE    Family history of blood clots     GERD (gastroesophageal reflux disease)     Headache(784.0)     Headache, tension-type     Hemorrhage of rectum and anus 10/02/2017    Formatting of this note might be different from the original.  Added automatically from request for surgery [id number]    High blood pressure     History of COVID-19 12/30/2020    Symptoms started on 12/30/2020 and then got worse.  Today she is feeling a little bit better.  She is a candidate for monoclonal antibody infusion and set for 1/6/21 @ 1pm at [facility]. 01/07/21 - telemedicine -     Hyperlipidemia     Hypertension     Knee pain, bilateral     Especially right    Medial epicondylitis of elbow, left 04/03/2018    Formatting of this note might be different from the original.  Added automatically from request for surgery [id number]    Medial epicondylitis of right elbow 07/17/2023    Medical marijuana use     Memory loss     Migraines     Obesity     Overactive bladder     Primary osteoarthritis of both knees 08/08/2018    Pulmonary emboli (HCC)     Restrictive lung disease 02/01/2017    Last Assessment & " Plan:   Formatting of this note might be different from the original.  I reviewed this problem throughout the rest of the note. Please refer to the other assessments for details.    Sjogren's disease (HCC)     Sleep apnea     Stress incontinence     Suicidal ideations     Teeth missing     Tremor     per pt Tremors of Right Leg and both Arms    Visual impairment     Wears glasses        Past Surgical History:   Procedure Laterality Date    BREAST CYST EXCISION Right 1989    CARPAL TUNNEL RELEASE Left     CHOLECYSTECTOMY  05/2003    Laparoscopic    COLONOSCOPY      01/12/2009    DILATION AND CURETTAGE OF UTERUS      ELBOW SURGERY Left     x2. No hardware    ESOPHAGOGASTRODUODENOSCOPY      FOOT SURGERY Left     Plantar fasciotomy    HERNIA REPAIR      HYSTERECTOMY      laporoscopic, partial    KNEE ARTHROSCOPY Bilateral     OOPHORECTOMY Left 10/2015    AR GASTROCNEMIUS RECESSION Left 02/24/2021    Procedure: RECESSION GASTROC OPEN;  Surgeon: Nomi Yang DPM;  Location:  MAIN OR;  Service: Podiatry    AR RPR UMBILICAL HRNA 5 YRS/> REDUCIBLE N/A 04/24/2019    Procedure: REPAIR HERNIA UMBILICAL LAPAROSCOPIC W/ ROBOTICS;  Surgeon: Anahi Colon MD;  Location: [facility] Main OR;  Service: General    AR SPHINCTEROTOMY ANAL DIVISION SPHINCTER SPX N/A 08/31/2018    Procedure: EUA, LEFT PARTIAL INTERNAL SPHINCTEROTOMY;  Surgeon: Tien Reyes MD;  Location:  MAIN OR;  Service: Colorectal    AR TNOT ELBOW LATERAL/MEDIAL DEBRIDE OPEN TDN RPR Left 09/20/2022    Procedure: RELEASE EPICONDYLAR ELBOW MEDIAL;  Surgeon: Kyree Winkler MD;  Location: [facility] MAIN OR;  Service: Orthopedics    REDUCTION MAMMAPLASTY Bilateral 1999    REPAIR LACERATION Left     left hand-5/18/2009    REPLACEMENT TOTAL KNEE Right     ROTATOR CUFF REPAIR Left     TONSILECTOMY AND ADNOIDECTOMY      US GUIDED MSK PROCEDURE  04/22/2021    VASCULAR SURGERY      VEIN LIGATION Bilateral     WISDOM TOOTH EXTRACTION         Meds/Allergies:    Medications  "Prior to Admission   Medication    atoMOXetine (STRATTERA) 40 mg capsule    buPROPion (WELLBUTRIN XL) 300 mg 24 hr tablet    cholecalciferol (VITAMIN D3) 1,000 units tablet    cloNIDine (CATAPRES) 0.1 mg tablet    furosemide (LASIX) 20 mg tablet    levothyroxine (Euthyrox) 125 mcg tablet    losartan (COZAAR) 100 MG tablet    lurasidone 60 MG TABS    naltrexone (REVIA) 50 mg tablet    OXcarbazepine (TRILEPTAL) 300 mg tablet    RABEprazole (ACIPHEX) 20 MG tablet    rOPINIRole (REQUIP) 1 mg tablet    rOPINIRole (REQUIP) 2 mg tablet    topiramate (TOPAMAX) 200 MG tablet    trospium chloride (SANCTURA) 20 mg tablet    warfarin (COUMADIN) 5 mg tablet    cephalexin (KEFLEX) 500 mg capsule    haloperidol decanoate (Haldol Decanoate) 50 mg/mL injection    melatonin 3 mg       Allergies   Allergen Reactions    Percocet [Oxycodone-Acetaminophen] Headache     Severe headaches   (denies issues with Tylenol)    Povidone Iodine Rash     Unsure if betadine or gauze post surgical. Got rash on leg.   Has  Had itchy rashes after every surgery prep and IV insertion    Chlorhexidine Rash     Per pt \"able to use the liquid soap--thinkd reaction from the sponges or wipes\"       Social History:     Marital Status: Single    Substance Use History:   Social History     Substance and Sexual Activity   Alcohol Use Not Currently    Comment: Recovering alcoholic     Social History     Tobacco Use   Smoking Status Former    Packs/day: 2.00    Years: 30.00    Total pack years: 60.00    Types: Cigarettes    Start date: 1985    Quit date: 2018    Years since quittin.9   Smokeless Tobacco Never   Tobacco Comments    Started at age 16.     Social History     Substance and Sexual Activity   Drug Use Yes    Types: Marijuana, Methamphetamines    Comment: 2 months off meth       Family History:    Family History   Problem Relation Age of Onset    Kidney cancer Mother     Diabetes Mother     Depression Mother     Stroke Mother     Arthritis " "Mother     Cancer Mother     Psychiatric Illness Mother     No Known Problems Father     No Known Problems Sister     No Known Problems Maternal Grandmother     No Known Problems Maternal Grandfather     No Known Problems Paternal Grandmother     No Known Problems Paternal Grandfather     Colon cancer Maternal Uncle     Colon cancer Maternal Uncle     Colon cancer Paternal Uncle     Colon cancer Family     Alcohol abuse Sister     Asthma Sister          OBJECTIVE:    Vitals:   Blood Pressure: 132/73 (12/10/23 0722)  Pulse: 80 (12/10/23 0722)  Temperature: (!) 97 °F (36.1 °C) (12/10/23 0722)  Temp Source: Temporal (12/10/23 0722)  Respirations: 18 (12/10/23 0722)  Height: 5' 6\" (167.6 cm) (12/01/23 2105)  Weight - Scale: 134 kg (295 lb 4.8 oz) (12/10/23 0959)  SpO2: 96 % (12/10/23 0722)    Physical Exam:     General Appearance: Alert, cooperative, no distress.  HEENT: Head normocephalic, atraumatic, without obvious abnormality.  Heart: Normal rate and rhythm.  Lungs: Non-labored breathing. No respiratory distress.  Abdomen: Without distension.  Psychiatric: AAOx3  Lower Extremity:  Vascular:   DP/PT pedal pulses on the left are weak. DP/PT pedal pulses on the right are weak. CRT brisk at the digits. Pedal hair is absent. 1+ edema noted at bilateral lower extremities. Skin temperature is within normal limits bilaterally.    Musculoskeletal:  MMT is 4/5 in all muscle compartments bilaterally. Passive ROM at the 1st MPJ and ankle joint are Normal bilaterally with the leg extended. Pain on palpation of the right second toe PIPJ, proximal phalanx, and dorsal second MTPJ.  There is pain during range of motion of the right second MPJ and PIPJ.  There is a severe flexible hammertoe deformity noted to the right second toe. No gross deformities noted.     Dermatological:  Ecchymosis, erythema, and mild edema to the right dorsal second MPJ and proximal second toe.  No open ulcerations noted bilaterally.  Right hallux lateral " "nail border status post partial nail avulsion for ingrown toenail, currently with a scab and no active drainage.  No pain on palpation of the right hallux.    Neurological:  Gross sensation is intact. Protective sensation is intact. Patient Denies numbness and/or paresthesias.        Additional Data:     Lab Results: I have personally reviewed pertinent labs including:              Invalid input(s): \"LABALBU\"  Results from last 7 days   Lab Units 12/09/23  0536   INR  1.99*       Cultures: I have personally reviewed pertinent cultures including:              Imaging: I have personally reviewed pertinent films in PACS.  EKG, Pathology, and Other Studies: I have personally reviewed pertinent reports.      "

## 2023-12-10 NOTE — NURSING NOTE
Pt calm and cooperative. Visible and social. +meals and meds. Denies SI/HI. Denies psychosis. No complaints or acute behaviors. No need to utilize PRN mediations. Q7's maintained. Will cont to provide support as needed.

## 2023-12-10 NOTE — NURSING NOTE
Patient endorses mild depression. Denies anxiety, SI/HI/AH/VH. Pleasant and cooperative with care. Medication compliant. Patient is visible in the dayroom sociable with peers. Patient denies any needs at this time. Safety checks ongoing.

## 2023-12-11 ENCOUNTER — TELEPHONE (OUTPATIENT)
Dept: NEUROLOGY | Facility: CLINIC | Age: 53
End: 2023-12-11

## 2023-12-11 PROCEDURE — 99232 SBSQ HOSP IP/OBS MODERATE 35: CPT | Performed by: STUDENT IN AN ORGANIZED HEALTH CARE EDUCATION/TRAINING PROGRAM

## 2023-12-11 RX ORDER — WATER 10 ML/10ML
INJECTION INTRAMUSCULAR; INTRAVENOUS; SUBCUTANEOUS
Status: COMPLETED
Start: 2023-12-11 | End: 2023-12-11

## 2023-12-11 RX ORDER — LURASIDONE HYDROCHLORIDE 40 MG/1
40 TABLET, FILM COATED ORAL
Status: DISCONTINUED | OUTPATIENT
Start: 2023-12-11 | End: 2023-12-13

## 2023-12-11 RX ADMIN — Medication 250 MG: at 08:34

## 2023-12-11 RX ADMIN — WARFARIN SODIUM 7.5 MG: 5 TABLET ORAL at 17:29

## 2023-12-11 RX ADMIN — OLANZAPINE 10 MG: 10 TABLET, FILM COATED ORAL at 11:31

## 2023-12-11 RX ADMIN — PALIPERIDONE 9 MG: 6 TABLET, EXTENDED RELEASE ORAL at 08:34

## 2023-12-11 RX ADMIN — OXCARBAZEPINE 450 MG: 150 TABLET, FILM COATED ORAL at 21:30

## 2023-12-11 RX ADMIN — LORAZEPAM 1 MG: 2 INJECTION INTRAMUSCULAR; INTRAVENOUS at 15:04

## 2023-12-11 RX ADMIN — OLANZAPINE 10 MG: 10 INJECTION, POWDER, LYOPHILIZED, FOR SOLUTION INTRAMUSCULAR at 16:58

## 2023-12-11 RX ADMIN — BUPROPION HYDROCHLORIDE 450 MG: 300 TABLET, EXTENDED RELEASE ORAL at 08:34

## 2023-12-11 RX ADMIN — TOPIRAMATE 200 MG: 100 TABLET, FILM COATED ORAL at 08:34

## 2023-12-11 RX ADMIN — WATER: 1 INJECTION, SOLUTION INTRAMUSCULAR; INTRAVENOUS; SUBCUTANEOUS at 16:58

## 2023-12-11 RX ADMIN — OXYBUTYNIN CHLORIDE 5 MG: 5 TABLET ORAL at 08:34

## 2023-12-11 RX ADMIN — Medication 250 MG: at 17:30

## 2023-12-11 RX ADMIN — LOSARTAN POTASSIUM 100 MG: 50 TABLET, FILM COATED ORAL at 08:34

## 2023-12-11 RX ADMIN — PANTOPRAZOLE SODIUM 40 MG: 40 TABLET, DELAYED RELEASE ORAL at 05:36

## 2023-12-11 RX ADMIN — MELATONIN TAB 3 MG 3 MG: 3 TAB at 21:34

## 2023-12-11 RX ADMIN — CLONIDINE HYDROCHLORIDE 0.1 MG: 0.1 TABLET ORAL at 08:34

## 2023-12-11 RX ADMIN — OXYBUTYNIN CHLORIDE 5 MG: 5 TABLET ORAL at 17:30

## 2023-12-11 RX ADMIN — ATOMOXETINE 40 MG: 40 CAPSULE ORAL at 08:34

## 2023-12-11 RX ADMIN — ERGOCALCIFEROL 50000 UNITS: 1.25 CAPSULE ORAL at 08:34

## 2023-12-11 RX ADMIN — FUROSEMIDE 20 MG: 20 TABLET ORAL at 08:34

## 2023-12-11 RX ADMIN — OXCARBAZEPINE 450 MG: 150 TABLET, FILM COATED ORAL at 08:34

## 2023-12-11 RX ADMIN — NALTREXONE HYDROCHLORIDE 50 MG: 50 TABLET, FILM COATED ORAL at 08:34

## 2023-12-11 RX ADMIN — LEVOTHYROXINE SODIUM 125 MCG: 125 TABLET ORAL at 05:36

## 2023-12-11 RX ADMIN — CYANOCOBALAMIN TAB 1000 MCG 1000 MCG: 1000 TAB at 08:34

## 2023-12-11 RX ADMIN — TOPIRAMATE 200 MG: 100 TABLET, FILM COATED ORAL at 17:30

## 2023-12-11 RX ADMIN — LURASIDONE HYDROCHLORIDE 40 MG: 40 TABLET, COATED ORAL at 17:30

## 2023-12-11 RX ADMIN — LORAZEPAM 1 MG: 1 TABLET ORAL at 09:45

## 2023-12-11 NOTE — QUICK NOTE
Right foot x-rays were reviewed.  No evidence of fractures to the second toe or the rest of the foot.  Therefore, this is likely simply a contusion versus pain from the hammertoe deformity.  Patient can be weightbearing to the right foot as tolerated in a postop shoe.  Patient can follow-up in office for further management of her hammertoe deformity.  Podiatry will sign off at this time, please reconsult as needed.  Thank you.

## 2023-12-11 NOTE — CASE MANAGEMENT
Blanca wanted to speak to  to see if this wrter could mention to the ACT team that she would like to get into the DBT group that they started. Advised that would be mentioned to them.     Patient got upset when it was mentioned that depending on when she is accepted at [facility], there is a process and it could total at most 6 months prior to getting back home, she then got upset and said that the psychiatrist told her that it would only be about 2-3 months and she said she hates being lied to. Advised that none of us have control over how long she will be at [facility], that will be determined by the facility that she goes to and how she responds to her treatment.     Patient had her back to this writer and was refusing to talk. Advised that she cannot behave like this and get so angry, this is the behavior and impulsivity that got her here in the first place. She said that ordinarily she would walk away and not talk, but she didn't do that. Eventually she was able to speak to this writer and express her feelings. This is progress and she is learning being here in this controlled setting and was able to recognize that.

## 2023-12-11 NOTE — TREATMENT TEAM
"Pt completed  relapse prevention plan. Pt signed and copy in chart.  Crisis, warmline and 988 numbers provided.  Pt noted signs an symptoms upon admission as \"depression increase and isolation\".  Pt did not indicate anger although she has been requesting anger management worksheets. Pt attends groups regularly.       12/11/23 1100   Activity/Group Checklist   Group Admission/Discharge   Attendance Attended   Attendance Duration (min) 16-30   Interactions Interacted appropriately   Affect/Mood Appropriate   Goals Achieved Identified feelings;Discussed safety plan;Identified relapse prevention strategies;Able to listen to others;Able to engage in interactions       "

## 2023-12-11 NOTE — PLAN OF CARE
Pt attends group and participates.     Problem: Ineffective Coping  Goal: Participates in unit activities  Description: Interventions:  - Provide therapeutic environment   - Provide required programming   - Redirect inappropriate behaviors   Outcome: Progressing

## 2023-12-11 NOTE — NURSING NOTE
PRN ativan appears to be effective. Pt is currently sitting calmly in room. Continues to be selectively mute w/ staff.

## 2023-12-11 NOTE — CASE MANAGEMENT
Patient waved this writer in to day room to advise that she needs to have her results from her xray that she had on her feet. She advised that no one is telling her anything.     Asked patient's nurse and she advised that she told the patient that she was in the middle of a task and would look as soon as she was done, patient did not want to wait. Then the patient was ignoring and not talking to the nurse when she was trying to rectify the situation.

## 2023-12-11 NOTE — NURSING NOTE
"Pt has been labile this morning. Becomes upset when breakfast tray is incorrect. Tearful and mute w/ staff. Refusing vital signs. Difficult to redirect. Strong encouragement for meds and vitals given. Pt able to briefly turn mood around and take scheduled meds w/ no issue., but again becomes tearful and irritable over breakfast tray. TeaRFUL w/ head in hands, poor eye contact. Crying\"I just want to go home.\" Staff support and reassurance provided. 1mg PO ativan given PRN for a minaya of 25. Will monitor for effectiveness and continue to provide support as needed.   "

## 2023-12-11 NOTE — NURSING NOTE
"Pt becomes agitated while sitting in dayroom. Exits room abruptly going to bedroom. Begins slamming doors and throwing things in room. Staff calls for security walkthrough. Pt complains to selective staff members that \"no one cares\", and describes feelings that needs are not being met. Remains selectively mute w/ this writer and difficult to redirect. Pt placed in brief supportive hold. 1mg ativan IM administered. Tolerated well. Pt reminded of unit expectations and protocol. Encouraged to utilize coping skills and remain controlled in behavior. Will monitor for medication effectiveness and cont to provide support and redirection as needed.   "

## 2023-12-11 NOTE — NURSING NOTE
"Patient reports \"some\" depression, denies anxiety, SI/HI/AH/VH. Patient states she had a great conversation on the phone with her dad and friend, it cheered her up. Patient is cooperative with care. Medication compliant. Patient is visible in the dayroom watching television with peers. Patient encouraged to make needs known. Safety checks ongoing.  "

## 2023-12-11 NOTE — PROGRESS NOTES
12/11/23 0802   Team Meeting   Meeting Type Daily Rounds   Initial Conference Date 12/11/23   Next Conference Date 12/12/23   Team Members Present   Team Members Present Physician;Nurse;;;Occupational Therapist   Physician Team Member Dr Smith, SHEFALI Lovell   Nursing Team Member Aram   Care Management Team Member Atiya   Social Work Team Member Dalia TALBOT Team Member Yeison   Patient/Family Present   Patient Present No   Patient's Family Present No     Podiatry, xray of left foot, surgical shoe, invega 9mg today, latuda decreased, will get SNOWDEN Invega sustenna. EAC  pending to resend at end of month.

## 2023-12-11 NOTE — CMS CERTIFICATION NOTE
Certification: Based upon physical, mental and social evaluations, I certify that inpatient psychiatric services are medically necessary for this patient for a duration of 21 midnights for the treatment of Schizoaffective disorder, bipolar type (HCC)  Available alternative community resources do not meet the patient's mental health care needs.  I further attest that an established written individualized plan of care has been implemented and is outlined in the patient's medical records.

## 2023-12-11 NOTE — PROGRESS NOTES
"Progress Note - Behavioral Health   Blanca Mason 53 y.o. female MRN: [id number]  Unit/Bed#: [facility] 651-02 Encounter: [id number]    Assessment/Plan   Principal Problem:    Schizoaffective disorder, bipolar type (HCC)  Active Problems:    Benign essential hypertension    Edema    Hypothyroidism    MAG (obstructive sleep apnea)    Overactive bladder    Sjogren's syndrome (HCC)    Medical clearance for psychiatric admission    Anxiety    Borderline personality disorder (HCC)    History of pulmonary embolism    Ingrown toenail      Recommended Treatment:   Cross-titrate Latuda to Invega with consideration for long-acting injectable: Decrease Latuda to 40 mg with dinner and increase Invega to 9 mg daily    Continue with group therapy, milieu therapy and occupational therapy.    Continue frequent safety checks and vitals per unit protocol.  Case discussed with treatment team.  Risks, benefits and possible side effects of Medications: Risks, benefits, and possible side effects of medications have previously been explained. No new medications at this time.    Current Legal Status: [facility]  Disposition: [facility] referral  ------------------------------------------------------------    Subjective: Per nursing report, Blanca has been labile on the unit -upset over lunch tray  but compliant with scheduled medications.  PRNs: Ativan 1 mg p.o. and Zyprexa 10 mg p.o. this morning    Today, Blanca was seen and evaluated for continuity of care. She reports feeling \"pretty good\", though notes she had a \"rough morning\" and was feeling \"homesick\". Patient states he became upset over some issues with her breakfast and normally though she would have \"climbed up\" and refused vitals/medications, she was able to think about things before acting and was cooperative.  She notes feeling \"proud\" due to this.  Patient was allowed to express her feelings and was provided positive reinforcement.  She reports having received total of 5 hours of sleep overnight " "due to some frequent awakenings and difficulty falling asleep.  She notes eating 3 meals and an adequate appetite for breakfast this morning.  She typically reports only eating 1 meal a day.  Patient reports she enjoys going to groups and is looking forward to working on anger management worksheets as well as attending anger management classes outpatient.  Patient reports tolerating current medication regimen adequately.  She denies SI/HI/AVH.    Progress Toward Goals: slow improvement    Psychiatric Review of Systems:  Behavior over the last 24 hours:  improving slowly  Sleep: frequent awakenings and difficulty falling asleep  Appetite: adequate, improving  Medication side effects: none verbalized  ROS: Complete review of systems is negative except as noted above.    Vital signs in last 24 hours:   Temp:  [97.2 °F (36.2 °C)] 97.2 °F (36.2 °C)  HR:  [100] 100  Resp:  [18] 18  BP: (148-157)/(61-78) 157/78    Mental Status Exam:  Appearance:  alert, good eye contact, appears stated age, appropriate grooming and hygiene, and obese   Behavior:  calm, cooperative, and sitting comfortably   Motor: normal gait and balance and some fidgeting   Speech:  spontaneous, clear, normal rate, normal volume, and coherent   Mood:  \"Pretty good\" \"rough morning\", \"homesick\"   Affect:  constricted   Thought Process:  Organized, logical, goal-directed   Thought Content: no verbalized delusions or overt paranoia   Perceptual disturbances: no reported hallucinations and does not appear to be responding to internal stimuli at this time   Risk Potential: No active or passive suicidal or homicidal ideation was verbalized during interview   Cognition: oriented to self and situation, appears to be of average intelligence, and cognition not formally tested   Insight:  Limited   Judgment: Limited     Current Medications:  Current Facility-Administered Medications   Medication Dose Route Frequency Provider Last Rate    aluminum-magnesium " hydroxide-simethicone  30 mL Oral Q4H PRN Wendyvern Anderson, CRNP      atoMOXetine  40 mg Oral Daily Anatoly Prajapati, DO      buPROPion  450 mg Oral Daily Anatoly Prajapati, DO      cloNIDine  0.1 mg Oral Q12H TASHA Anatoly Prajapati, DO      cyanocobalamin  1,000 mcg Oral Daily Dianna Mcrae, CRNP      cyanocobalamin  1,000 mcg Intramuscular Q30 Days Dianna Mcrae, CRNP      ergocalciferol  50,000 Units Oral Weekly Dianna Mcrae, CRNP      furosemide  20 mg Oral Daily Wendy Anderson, CRNP      hydrOXYzine HCL  25 mg Oral Q6H PRN Max 4/day Virginia Mccullough, CRNP      hydrOXYzine HCL  50 mg Oral Q6H PRN Max 4/day Vigrinia Mccullough, CRNP      ibuprofen  200 mg Oral Q6H PRN Virginia Mccullough, CRNP      ibuprofen  400 mg Oral Q6H PRN Virginia Mccullough, CRNP      ibuprofen  600 mg Oral Q8H PRN Virginia Mccullough, CRNP      levothyroxine  125 mcg Oral Early Morning Wendy Anderson, CRNP      loperamide  2 mg Oral Q4H PRN Anatoly Prajapati, DO      LORazepam  1 mg Intramuscular Q6H PRN Max 3/day Virginia Mccullough, CRNP      LORazepam  1 mg Oral Q6H PRN Max 3/day Virginia Mccullough, CRNP      losartan  100 mg Oral Daily Wendy Jennifer Anderson, CRNP      lurasidone  40 mg Oral Daily With Dinner Frederick Smith MD      melatonin  3 mg Oral HS Frederick Smith MD      naltrexone  50 mg Oral Daily Anatoly Prajapati, DO      OLANZapine  10 mg Oral Q6H PRN Frederick Smith MD      Or    OLANZapine  10 mg Intramuscular Q6H PRN Frederick Smith MD      OLANZapine  5 mg Oral Q6H PRN Frederick Smith MD      Or    OLANZapine  5 mg Intramuscular Q6H PRN Frederick Smith MD      OLANZapine  2.5 mg Oral Q3H PRN Frederick Smith MD      OXcarbazepine  450 mg Oral Q12H [facility] Frederick Smith MD      oxybutynin  5 mg Oral BID Wendy Anderson, CRNP      paliperidone  9 mg Oral Daily Frederick Smith MD      pantoprazole  40 mg Oral Early Morning JHONATAN Fields      polyethylene glycol  17 g Oral Daily PRN [place]  JHONATAN Mccullough      saccharomyces boulardii  250 mg Oral BID Anatoly Prajapati,       senna-docusate sodium  1 tablet Oral Daily PRN JHONATAN Anne      topiramate  200 mg Oral BID Anatoly Prajapati, DO      traZODone  50 mg Oral HS PRN JHONATAN Anne      warfarin  7.5 mg Oral Daily (warfarin) JHONATAN Berg         Behavioral Health Medications: all current active meds have been reviewed. Changes as in plan section above.    Laboratory results:  I have personally reviewed all pertinent laboratory/tests results.  No results found for this or any previous visit (from the past 48 hour(s)).     Hedy Masters DO

## 2023-12-11 NOTE — NURSING NOTE
PRN ativan minimally effective. Pt able to attend group and remain free from outbursts and negative behaviors. Continues to remain labile in mood and tearful at times. Will cont to provide support as needed.

## 2023-12-11 NOTE — NURSING NOTE
Pt remains labile w/ frequent mood shifts. Irritable edge, low frustration tolerance. Selectively mute. Attempts to staff split. Mute w/ nurse, and then complains to other staff members that nurse is not meeting patient's needs and requests. Verbalizes thoughts to throw and break things. Agreeable to taking PO Zyprexa for agitation. 10 mg PO administered. Will monitor for effectiveness.

## 2023-12-11 NOTE — NURSING NOTE
PRN Zyprexa somewhat effective. Pt able to be controlled in behaviors/ no outbursts, but remains irritable, demanding, and selectively mute. Will cont to monitor.

## 2023-12-11 NOTE — TREATMENT TEAM
"Pt attended all groups.  Constricted affect and did not engage with poor eye contact ton the afternoon.   After group pt requested more worksheets.  (Provided \"Conflict and me\")  Pt showed previous worksheets still work in progress.  Pt noted she gets struck on areas and examples of what she learned from interaction/what would you differently and where does anger feel physically.  Pt indicated that she was going to have visit from Act team this week(unconfirmed).      12/11/23 1330   Activity/Group Checklist   Group Other (Comment)  (community supports and affirmations)   Attendance Attended   Attendance Duration (min) 46-60   Interactions Did not interact   Affect/Mood Constricted   Goals Achieved Identified feelings;Identified relapse prevention strategies;Discussed coping strategies;Discussed self-esteem issues;Able to listen to others        "

## 2023-12-11 NOTE — PLAN OF CARE
Problem: Risk for Self Injury/Neglect  Goal: Treatment Goal: Remain safe during length of stay, learn and adopt new coping skills, and be free of self-injurious ideation, impulses and acts at the time of discharge  Outcome: Progressing  Goal: Verbalize thoughts and feelings  Description: Interventions:  - Assess and re-assess patient's lethality and potential for self-injury  - Engage patient in 1:1 interactions, daily, for a minimum of 15 minutes  - Encourage patient to express feelings, fears, frustrations, hopes  - Establish rapport/trust with patient   Outcome: Progressing  Goal: Refrain from harming self  Description: Interventions:  - Monitor patient closely, per order  - Develop a trusting relationship  - Supervise medication ingestion, monitor effects and side effects   Outcome: Progressing  Goal: Recognize maladaptive responses and adopt new coping mechanisms  Outcome: Progressing  Goal: Complete daily ADLs, including personal hygiene independently, as able  Description: Interventions:  - Observe, teach, and assist patient with ADLS  - Monitor and promote a balance of rest/activity, with adequate nutrition and elimination  Outcome: Progressing     Problem: Depression  Goal: Treatment Goal: Demonstrate behavioral control of depressive symptoms, verbalize feelings of improved mood/affect, and adopt new coping skills prior to discharge  Outcome: Progressing  Goal: Verbalize thoughts and feelings  Description: Interventions:  - Assess and re-assess patient's level of risk   - Engage patient in 1:1 interactions, daily, for a minimum of 15 minutes   - Encourage patient to express feelings, fears, frustrations, hopes   Outcome: Progressing  Goal: Refrain from harming self  Description: Interventions:  - Monitor patient closely, per order   - Supervise medication ingestion, monitor effects and side effects   Outcome: Progressing  Goal: Refrain from isolation  Description: Interventions:  - Develop a trusting  relationship   - Encourage socialization   Outcome: Progressing  Goal: Refrain from self-neglect  Outcome: Progressing  Goal: Complete daily ADLs, including personal hygiene independently, as able  Description: Interventions:  - Observe, teach, and assist patient with ADLS  -  Monitor and promote a balance of rest/activity, with adequate nutrition and elimination   Outcome: Progressing     Problem: Anxiety  Goal: Anxiety is at manageable level  Description: Interventions:  - Assess and monitor patient's anxiety level.   - Monitor for signs and symptoms (heart palpitations, chest pain, shortness of breath, headaches, nausea, feeling jumpy, restlessness, irritable, apprehensive).   - Collaborate with interdisciplinary team and initiate plan and interventions as ordered.  - [place] patient to unit/surroundings  - Explain treatment plan  - Encourage participation in care  - Encourage verbalization of concerns/fears  - Identify coping mechanisms  - Assist in developing anxiety-reducing skills  - Administer/offer alternative therapies  - Limit or eliminate stimulants  Outcome: Progressing     Problem: Risk for Violence/Aggression Toward Others  Goal: Treatment Goal: Refrain from acts of violence/aggression during length of stay, and demonstrate improved impulse control at the time of discharge  Outcome: Progressing  Goal: Verbalize thoughts and feelings  Description: Interventions:  - Assess and re-assess patient's level of risk, every waking shift  - Engage patient in 1:1 interactions, daily, for a minimum of 15 minutes   - Allow patient to express feelings and frustrations in a safe and non-threatening manner   - Establish rapport/trust with patient   Outcome: Progressing  Goal: Refrain from harming others  Outcome: Progressing  Goal: Refrain from destructive acts on the environment or property  Outcome: Progressing  Goal: Control angry outbursts  Description: Interventions:  - Monitor patient closely, per order  -  Ensure early verbal de-escalation  - Monitor prn medication needs  - Set reasonable/therapeutic limits, outline behavioral expectations, and consequences   - Provide a non-threatening milieu, utilizing the least restrictive interventions   Outcome: Progressing  Goal: Attend and participate in unit activities, including therapeutic, recreational, and educational groups  Description: Interventions:  - Provide therapeutic and educational activities daily, encourage attendance and participation, and document same in the medical record   Outcome: Progressing  Goal: Identify appropriate positive anger management techniques  Description: Interventions:  - Offer anger management and coping skills groups   - Staff will provide positive feedback for appropriate anger control  Outcome: Progressing

## 2023-12-11 NOTE — NURSING NOTE
"Pt requested a meeting w/ case management. Apon meeting w/ case management dinner arrived to the unit. When meeting ended.. Pt came to dining room to accept meal tray. Tray was not immediately distributed. Pt cursed at staff, went to room and slammed door. On assessment Pt was in BR; not responding to staff prompts. Apon staff entering BR Pt standing at sink making fists and gritting teeth. Pt not verbally responsive to assessment. Security called to unit for support. Pt agreeable to PRN Zyprexa. Agrees to remain free from violent outbursts. 10 mg IM Zyprexa administered to R delt. Tolerated well. Pt reminded of unit expectations, and educated on next steps for escalation in behaviors. . Pt takes no accountability for actions, and continues to blame others for behaviors. Pt states, \"No one cares about me. It's been a shitty day. Then they tell me I might have to be here a long time before I can even go to [facility].\" Staff offers encouragement that Pt's behaviors contribute to length of stay, and continues to promote utilization of coping skills. Pt currently not receptive, and lacks insight. Will monitor for medication effectiveness and assess the need for change of environment/seclusion.  "

## 2023-12-12 ENCOUNTER — DOCUMENTATION (OUTPATIENT)
Dept: CASE MANAGEMENT | Facility: HOSPITAL | Age: 53
End: 2023-12-12

## 2023-12-12 LAB
INR PPP: 2.33 (ref 0.84–1.19)
PROTHROMBIN TIME: 25.8 SECONDS (ref 11.6–14.5)

## 2023-12-12 PROCEDURE — 80183 DRUG SCRN QUANT OXCARBAZEPIN: CPT | Performed by: STUDENT IN AN ORGANIZED HEALTH CARE EDUCATION/TRAINING PROGRAM

## 2023-12-12 PROCEDURE — 99232 SBSQ HOSP IP/OBS MODERATE 35: CPT | Performed by: STUDENT IN AN ORGANIZED HEALTH CARE EDUCATION/TRAINING PROGRAM

## 2023-12-12 PROCEDURE — 85610 PROTHROMBIN TIME: CPT | Performed by: NURSE PRACTITIONER

## 2023-12-12 RX ORDER — WARFARIN SODIUM 5 MG/1
7.5 TABLET ORAL
Status: COMPLETED | OUTPATIENT
Start: 2023-12-12 | End: 2023-12-14

## 2023-12-12 RX ADMIN — LEVOTHYROXINE SODIUM 125 MCG: 125 TABLET ORAL at 05:51

## 2023-12-12 RX ADMIN — TOPIRAMATE 200 MG: 100 TABLET, FILM COATED ORAL at 08:20

## 2023-12-12 RX ADMIN — PANTOPRAZOLE SODIUM 40 MG: 40 TABLET, DELAYED RELEASE ORAL at 05:51

## 2023-12-12 RX ADMIN — CLONIDINE HYDROCHLORIDE 0.1 MG: 0.1 TABLET ORAL at 08:20

## 2023-12-12 RX ADMIN — FUROSEMIDE 20 MG: 20 TABLET ORAL at 08:20

## 2023-12-12 RX ADMIN — WARFARIN SODIUM 7.5 MG: 5 TABLET ORAL at 17:42

## 2023-12-12 RX ADMIN — CYANOCOBALAMIN TAB 1000 MCG 1000 MCG: 1000 TAB at 08:19

## 2023-12-12 RX ADMIN — Medication 250 MG: at 08:20

## 2023-12-12 RX ADMIN — OXCARBAZEPINE 450 MG: 150 TABLET, FILM COATED ORAL at 21:33

## 2023-12-12 RX ADMIN — OXYBUTYNIN CHLORIDE 5 MG: 5 TABLET ORAL at 08:20

## 2023-12-12 RX ADMIN — ATOMOXETINE 40 MG: 40 CAPSULE ORAL at 08:19

## 2023-12-12 RX ADMIN — LOSARTAN POTASSIUM 100 MG: 50 TABLET, FILM COATED ORAL at 08:19

## 2023-12-12 RX ADMIN — PALIPERIDONE 9 MG: 6 TABLET, EXTENDED RELEASE ORAL at 08:19

## 2023-12-12 RX ADMIN — MELATONIN TAB 3 MG 3 MG: 3 TAB at 21:33

## 2023-12-12 RX ADMIN — LURASIDONE HYDROCHLORIDE 40 MG: 40 TABLET, COATED ORAL at 16:26

## 2023-12-12 RX ADMIN — OLANZAPINE 10 MG: 10 TABLET, FILM COATED ORAL at 09:12

## 2023-12-12 RX ADMIN — CLONIDINE HYDROCHLORIDE 0.1 MG: 0.1 TABLET ORAL at 21:33

## 2023-12-12 RX ADMIN — NALTREXONE HYDROCHLORIDE 50 MG: 50 TABLET, FILM COATED ORAL at 08:19

## 2023-12-12 RX ADMIN — OXCARBAZEPINE 450 MG: 150 TABLET, FILM COATED ORAL at 08:19

## 2023-12-12 RX ADMIN — TOPIRAMATE 200 MG: 100 TABLET, FILM COATED ORAL at 17:41

## 2023-12-12 RX ADMIN — BUPROPION HYDROCHLORIDE 450 MG: 300 TABLET, EXTENDED RELEASE ORAL at 08:19

## 2023-12-12 RX ADMIN — OXYBUTYNIN CHLORIDE 5 MG: 5 TABLET ORAL at 17:46

## 2023-12-12 RX ADMIN — Medication 250 MG: at 17:41

## 2023-12-12 NOTE — PROGRESS NOTES
12/12/23 0838   Team Meeting   Meeting Type Daily Rounds   Initial Conference Date 12/12/23   Next Conference Date 12/13/23   Team Members Present   Team Members Present Physician;Nurse;   Physician Team Member Dr Smith, SHEFALI Lovell   Nursing Team Member Jennifer   Care Management Team Member Atiya   Patient/Family Present   Patient Present No   Patient's Family Present No     Required PRN ativan yesterday due to being aggressive, medications increased.

## 2023-12-12 NOTE — NURSING NOTE
Patient was visible and social in the milieu with select peers. Report depression, denies all other psych s/s. The correct size of a right orthopedic shoe was provided to patient today. C/o B/L foot pain 5/10 but declined prn pain medication. Had 100% for both breakfast/lunch. Took her medications. Safety checks ongoing.

## 2023-12-12 NOTE — BEHAVIORAL HEALTH HIGH UTILIZER
Patient Name:Blanca Mason MRN:  [id number]         : 1970     Age: 53 y.o.    Sex: female   Utilization History:  (# of ED visits & IP admits; reasons)  Pt had 17 [facility]-ED visits from 2023-2023. ED presentations were related to SI with no plan or with a plan to overdose on medications. Pt intentionally overdosed on Flexeril twice and Trazodone twice. Pt reports periodic non-compliance with her medications.       Medical presentations were related to chest pain, right knee pain, withdrawing from meth, asthma exacerbation, fecal impaction, abdominal pain, toe or leg pain, dyspnea, SOB, bilateral leg edema, acute Saddle PE, acute respiratory failure. Treatment Recommendations & Presentation:  Presentation in the ED: Pt typically presents self to ED's. ED visits were related to SI with no plan or with a plan to overdose on medications. Pt intentionally overdosed on Flexeril twice and Trazodone twice. Pt reports periodic non-compliance with her medications.       Medical presentations were related to chest pain, right knee pain, withdrawing from meth, asthma exacerbation, fecal impaction, abdominal pain, toe or leg pain, dyspnea, SOB, bilateral leg edema, acute Saddle PE, acute respiratory failure.       ED Recommendations: Following medical evaluation and treatment, allow pt time for de-escalation and for Provider to establish acute risk versus pt's chronic behavioral disturbances. Please contact pt's [facility] ACT team to develop a safe discharge plan for pt to return home at [phone]. Pt should be contacting her ACT team when in Crisis and formulating an action plan to include distress tolerance coping skills.    Home Medication Regimen: see most recent documentation in Epic, you can also verify pt's medications with her ACT team.     Recent Medical Work Ups:  (include Psych testing or ECT)    Comprehensive labs - ,   CT's -   Xrays -   VAS -   ECG - ,   Inpatient  Recommendations: Collaborate with pt's community resources to develop a comprehensive discharge plan.        Outpatient recommendations: Pt should continue her medical and mental health care with her community providers and take her medications as prescribed.     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     Pt lives alone in an apartment that is close to where her father resides and has the [facility] [employer] [facility] team. Pt's mother  a few years ago in  and pt reports this as a difficult issue for her.   Pt has experienced various medical issues in  and appears to be very preoccupied with her medical conditions, perceived medical issues and treatment and becomes frustrated if she feels she is not obtaining the medical care she needs in a timely manner. Pt has displayed demanding and agitated behaviors while on behavioral health units.  Pt was active in going to see her father in the community and helping him as well as working part-time approx 21 hours per week. It is unknown if pt is still working. Pt appears to have poor distress tolerance and impulsivity. Pt appears to want to control her discharge date from inpatient units.         Diagnoses/Significant Problems (Medical & Psychiatric):       Schizoaffective disorder, bipolar type, Borderline Personality Disorder  Active Problems:    Benign essential hypertension    Edema    Hypothyroidism    MAG    Overactive bladder    Sjogren's syndrome ([facility])    COPD (chronic obstructive pulmonary disease) ([facility])    Medical clearance for psychiatric admission    History of pulmonary embolism    Ingrown toenail     Drivers of repeated utilization:  preoccupation with medical concerns and issues, personality disordered behaviors,  limited to poor coping skills, impulsivity, secondary gains from ED visits and hospitalizations.                                             Existing Community Resources & Supports:       father  Luigi Mason - [phone]          Patient Medical & Psychiatric Care Team:  [facility] - [phone]  [facility] PCP - [phone]   Hematology Oncology    Care plan date: 12/12/23                   Author:  Dennise Napier RN                 Date reviewed with patient:

## 2023-12-12 NOTE — PLAN OF CARE
Pt. Spontaneously engaging in the day room with peers both in and out of structured group times.  Problem: Ineffective Coping  Goal: Participates in unit activities  Description: Interventions:  - Provide therapeutic environment   - Provide required programming   - Redirect inappropriate behaviors   Outcome: Progressing

## 2023-12-12 NOTE — PROGRESS NOTES
Pt. Displayed appropriate social interaction in the dayroom with peers staff and pet therapy dog.   12/12/23 1330   Activity/Group Checklist   Group Pet therapy   Attendance Attended   Attendance Duration (min) 16-30   Interactions Interacted appropriately   Affect/Mood Appropriate;Bright;Normal range   Goals Achieved Able to listen to others;Able to engage in interactions;Able to manage/cope with feelings;Discussed coping strategies

## 2023-12-12 NOTE — NURSING NOTE
Pt calm and cooperative, Pt pleasant upon approach, Pt visible on the unit seen in the dayroom watching TV, coloring and talking with peers, Pt denied all pain anxiety and depression, Pt denies HI and SI , Pt denied AH and VH, Pt took all scheduled HS meds, Q7 min safety checks maintained

## 2023-12-12 NOTE — NURSING NOTE
PRN Zyprexa appears to have been effective Pt spent a period of time quietly sitting in room. Was able to come out to eat dinner and remain controlled in the community. Is currently coloring in the dayroom. Pt apologized to staff but presents as superficial, and continues to blame others and lack accountability for actions. Will cont to remain safe on the unit. Staff to maintain Q7's and offer support and redirection as needed.

## 2023-12-12 NOTE — PLAN OF CARE
Problem: Depression  Goal: Treatment Goal: Demonstrate behavioral control of depressive symptoms, verbalize feelings of improved mood/affect, and adopt new coping skills prior to discharge  Outcome: Progressing  Goal: Verbalize thoughts and feelings  Description: Interventions:  - Assess and re-assess patient's level of risk   - Engage patient in 1:1 interactions, daily, for a minimum of 15 minutes   - Encourage patient to express feelings, fears, frustrations, hopes   Outcome: Progressing  Goal: Refrain from harming self  Description: Interventions:  - Monitor patient closely, per order   - Supervise medication ingestion, monitor effects and side effects   Outcome: Progressing  Goal: Refrain from isolation  Description: Interventions:  - Develop a trusting relationship   - Encourage socialization   Outcome: Progressing  Goal: Refrain from self-neglect  Outcome: Progressing  Goal: Complete daily ADLs, including personal hygiene independently, as able  Description: Interventions:  - Observe, teach, and assist patient with ADLS  -  Monitor and promote a balance of rest/activity, with adequate nutrition and elimination   Outcome: Progressing     Problem: Anxiety  Goal: Anxiety is at manageable level  Description: Interventions:  - Assess and monitor patient's anxiety level.   - Monitor for signs and symptoms (heart palpitations, chest pain, shortness of breath, headaches, nausea, feeling jumpy, restlessness, irritable, apprehensive).   - Collaborate with interdisciplinary team and initiate plan and interventions as ordered.  - [place] patient to unit/surroundings  - Explain treatment plan  - Encourage participation in care  - Encourage verbalization of concerns/fears  - Identify coping mechanisms  - Assist in developing anxiety-reducing skills  - Administer/offer alternative therapies  - Limit or eliminate stimulants  Outcome: Progressing     Problem: Risk for Violence/Aggression Toward Others  Goal: Treatment Goal:  Refrain from acts of violence/aggression during length of stay, and demonstrate improved impulse control at the time of discharge  Outcome: Progressing  Goal: Verbalize thoughts and feelings  Description: Interventions:  - Assess and re-assess patient's level of risk, every waking shift  - Engage patient in 1:1 interactions, daily, for a minimum of 15 minutes   - Allow patient to express feelings and frustrations in a safe and non-threatening manner   - Establish rapport/trust with patient   Outcome: Progressing  Goal: Refrain from harming others  Outcome: Progressing  Goal: Refrain from destructive acts on the environment or property  Outcome: Progressing  Goal: Control angry outbursts  Description: Interventions:  - Monitor patient closely, per order  - Ensure early verbal de-escalation  - Monitor prn medication needs  - Set reasonable/therapeutic limits, outline behavioral expectations, and consequences   - Provide a non-threatening milieu, utilizing the least restrictive interventions   Outcome: Progressing  Goal: Identify appropriate positive anger management techniques  Description: Interventions:  - Offer anger management and coping skills groups   - Staff will provide positive feedback for appropriate anger control  Outcome: Progressing

## 2023-12-12 NOTE — NURSING NOTE
"Patient is visible in room resting during shift. Patient refused to answer assessment questions, mute with this writer. This writer asked patient if she would like her CPAP machine filled with water, in response she nodded her head \"yes\". Medication compliant. Patient encouraged to make needs known. Safety checks ongoing.  "

## 2023-12-12 NOTE — PROGRESS NOTES
"Progress Note - Behavioral Health   Blanca Mason 53 y.o. female MRN: [id number]  Unit/Bed#: [facility] 651-02 Encounter: [id number]    Assessment/Plan   Principal Problem:    Schizoaffective disorder, bipolar type (HCC)  Active Problems:    Benign essential hypertension    Edema    Hypothyroidism    MAG (obstructive sleep apnea)    Overactive bladder    Sjogren's syndrome (HCC)    Medical clearance for psychiatric admission    Anxiety    Borderline personality disorder (HCC)    History of pulmonary embolism    Ingrown toenail      Recommended Treatment:   Cross tapering off of Latuda to Invega: no medication changes today; continue psychiatric medications as prescribed    Continue with group therapy, milieu therapy and occupational therapy.    Continue frequent safety checks and vitals per unit protocol.  Case discussed with treatment team.  Risks, benefits and possible side effects of Medications: Risks, benefits, and possible side effects of medications have been explained to the patient, who verbalizes understanding    Current Legal Status: 201  Disposition: EAC referral  ------------------------------------------------------------    Subjective: Per nursing report, Blanca has been limited in cooperativity on the unit but compliant with scheduled medications. She is manipulative at times, blames others, and required control team yesterday due to agitation/violent outbursts at dinnertime. PRNs: Ativan 1 mg IM, Zyprexa 10 mg IM.     Today, Blanca was seen and evaluated for continuity of care. On evaluation, patient is tearful, depressed and anxious. She has a stress ball in hand and is noted to be looking out the window. Patient sits on bed for interview. She becomes tearful and reports being \"tired of being treated like I am a crazy person\" and reports feeling like \"no one cares\" about her. \"I'm so emotional\" and reports she wants to be home.  Patient was allowed to express frustrations and was provided reassurance. She " "was perseverative regarding speaking with case management, her right foot pain and x-ray. She reports she called her outpatient foot doctor and left a message for him.  This writer went over her R foot XR report with patient and was encouraged to communicate needs with staff and prn pain medications if needed for hammer toe pain. She was upset regarding not having a fitting shoe, which was relayed to staff to help coordinate getting a fitting shoe for her. Patient was educated on coping strategies and went over DBT grounding skills. Patient was agreeable to journaling thoughts and using grounding techniques as well as anger management worksheets if she becomes upset or angry.  Patient denies SI/HI/AVH.    Progress Toward Goals: slowly improving    Psychiatric Review of Systems:  Behavior over the last 24 hours:  regressed  Sleep: improving  Appetite: adequate, improving  Medication side effects: none verbalized  ROS: Complete review of systems is negative except as noted above.    Vital signs in last 24 hours:   Temp:  [96.8 °F (36 °C)-97.4 °F (36.3 °C)] 97.4 °F (36.3 °C)  HR:  [84-94] 84  Resp:  [16-18] 18  BP: ()/(52-88) 137/88    Mental Status Exam:  Appearance:  alert, minimal eye contact, appears stated age, casually dressed, appropriate grooming and hygiene, and obese,  female in NAD   Behavior:  calm, cooperative, and sitting comfortably, covering face with hands at times when tearful   Motor: restless and fidgety and normal gait and balance   Speech:  spontaneous, clear, increased rate at times, loud at times and at times soft, coherent, and talkative   Mood:  \"No one cares\" \"so emotional\"   Affect:  anxious and depressed, tearful   Thought Process:  perseverative   Thought Content: no verbalized delusions or overt paranoia, ruminating thoughts, negative thoughts   Perceptual disturbances: no reported hallucinations and does not appear to be responding to internal stimuli at this time   Risk " Potential: No active or passive suicidal or homicidal ideation was verbalized during interview   Cognition: oriented to self and situation, appears to be of average intelligence, and cognition not formally tested   Insight:  Limited   Judgment: Poor     Current Medications:  Current Facility-Administered Medications   Medication Dose Route Frequency Provider Last Rate    aluminum-magnesium hydroxide-simethicone  30 mL Oral Q4H PRN Wendy Anderson, CRNP      atoMOXetine  40 mg Oral Daily Anatoly Prajapati, DO      buPROPion  450 mg Oral Daily Anatoly Prajapati, DO      cloNIDine  0.1 mg Oral Q12H TASHA Anatoly Prajapati, DO      cyanocobalamin  1,000 mcg Oral Daily Dianna Mcrae, CRNP      cyanocobalamin  1,000 mcg Intramuscular Q30 Days Dianna Mcrae, JHONATAN      ergocalciferol  50,000 Units Oral Weekly Dianna Mcrae, LAURANP      furosemide  20 mg Oral Daily Wendy Anderson, CRNP      hydrOXYzine HCL  25 mg Oral Q6H PRN Max 4/day Virginia Mccullough, CRNP      hydrOXYzine HCL  50 mg Oral Q6H PRN Max 4/day Virginia Mccullough, CRNP      ibuprofen  200 mg Oral Q6H PRN Virginia Mccullough, CRNP      ibuprofen  400 mg Oral Q6H PRN Virginia Mccullough, CRNP      ibuprofen  600 mg Oral Q8H PRN Virginia Mccullough, CRNP      levothyroxine  125 mcg Oral Early Morning Wendy Anderson, CRNP      loperamide  2 mg Oral Q4H PRN Anatoly Prajapati, DO      LORazepam  1 mg Intramuscular Q6H PRN Max 3/day Virginia Mccullough, CRNP      LORazepam  1 mg Oral Q6H PRN Max 3/day Virginia Mccullough, CRNP      losartan  100 mg Oral Daily Wendy Anderson, CRNP      lurasidone  40 mg Oral Daily With Dinner Frederick Smith MD      melatonin  3 mg Oral HS Frederick Smith MD      naltrexone  50 mg Oral Daily Anatoly Prajapati, DO      OLANZapine  10 mg Oral Q6H PRN Frederick Smith MD      Or    OLANZapine  10 mg Intramuscular Q6H PRN Frederick Smith MD      OLANZapine  5 mg Oral Q6H PRN Frederick Smith MD      Or    OLANZapine  5 mg  Intramuscular Q6H PRN Frederick Smith MD      OLANZapine  2.5 mg Oral Q3H PRN Frederick Smith MD      OXcarbazepine  450 mg Oral Q12H [facility] Frederick Smith MD      oxybutynin  5 mg Oral BID JHONATAN Fields      paliperidone  9 mg Oral Daily Frederick Smith MD      pantoprazole  40 mg Oral Early Morning JHONATAN Fields      polyethylene glycol  17 g Oral Daily PRN JHONATAN Anne      saccharomyces boulardii  250 mg Oral BID Anatoly Prajapati DO      senna-docusate sodium  1 tablet Oral Daily PRN JHONATAN Anne      topiramate  200 mg Oral BID Anatoly Prajapati DO      traZODone  50 mg Oral HS PRN JHONATAN Anne      warfarin  7.5 mg Oral Daily (warfarin) JHONATAN Berg         Behavioral Health Medications: all current active meds have been reviewed. Changes as in plan section above.    Laboratory results:  I have personally reviewed all pertinent laboratory/tests results.  Recent Results (from the past 48 hour(s))   Protime-INR    Collection Time: 12/12/23  5:48 AM   Result Value Ref Range    Protime 25.8 (H) 11.6 - 14.5 seconds    INR 2.33 (H) 0.84 - 1.19        Hedy Masters DO

## 2023-12-13 PROCEDURE — 99232 SBSQ HOSP IP/OBS MODERATE 35: CPT | Performed by: STUDENT IN AN ORGANIZED HEALTH CARE EDUCATION/TRAINING PROGRAM

## 2023-12-13 RX ORDER — PALIPERIDONE 6 MG/1
12 TABLET, EXTENDED RELEASE ORAL DAILY
Status: COMPLETED | OUTPATIENT
Start: 2023-12-14 | End: 2023-12-21

## 2023-12-13 RX ORDER — LURASIDONE HYDROCHLORIDE 20 MG/1
20 TABLET, FILM COATED ORAL
Status: COMPLETED | OUTPATIENT
Start: 2023-12-13 | End: 2023-12-14

## 2023-12-13 RX ADMIN — CLONIDINE HYDROCHLORIDE 0.1 MG: 0.1 TABLET ORAL at 08:25

## 2023-12-13 RX ADMIN — CYANOCOBALAMIN TAB 1000 MCG 1000 MCG: 1000 TAB at 08:24

## 2023-12-13 RX ADMIN — LOSARTAN POTASSIUM 100 MG: 50 TABLET, FILM COATED ORAL at 08:26

## 2023-12-13 RX ADMIN — PALIPERIDONE 9 MG: 6 TABLET, EXTENDED RELEASE ORAL at 08:25

## 2023-12-13 RX ADMIN — OXCARBAZEPINE 450 MG: 150 TABLET, FILM COATED ORAL at 21:30

## 2023-12-13 RX ADMIN — Medication 250 MG: at 17:03

## 2023-12-13 RX ADMIN — FUROSEMIDE 20 MG: 20 TABLET ORAL at 08:26

## 2023-12-13 RX ADMIN — OXCARBAZEPINE 450 MG: 150 TABLET, FILM COATED ORAL at 08:24

## 2023-12-13 RX ADMIN — WARFARIN SODIUM 7.5 MG: 5 TABLET ORAL at 17:03

## 2023-12-13 RX ADMIN — CLONIDINE HYDROCHLORIDE 0.1 MG: 0.1 TABLET ORAL at 21:30

## 2023-12-13 RX ADMIN — BUPROPION HYDROCHLORIDE 450 MG: 300 TABLET, EXTENDED RELEASE ORAL at 08:24

## 2023-12-13 RX ADMIN — TOPIRAMATE 200 MG: 100 TABLET, FILM COATED ORAL at 08:26

## 2023-12-13 RX ADMIN — OXYBUTYNIN CHLORIDE 5 MG: 5 TABLET ORAL at 17:03

## 2023-12-13 RX ADMIN — LEVOTHYROXINE SODIUM 125 MCG: 125 TABLET ORAL at 06:17

## 2023-12-13 RX ADMIN — LURASIDONE HYDROCHLORIDE 20 MG: 20 TABLET, FILM COATED ORAL at 17:03

## 2023-12-13 RX ADMIN — MELATONIN TAB 3 MG 3 MG: 3 TAB at 21:30

## 2023-12-13 RX ADMIN — OXYBUTYNIN CHLORIDE 5 MG: 5 TABLET ORAL at 08:25

## 2023-12-13 RX ADMIN — NALTREXONE HYDROCHLORIDE 50 MG: 50 TABLET, FILM COATED ORAL at 08:24

## 2023-12-13 RX ADMIN — TOPIRAMATE 200 MG: 100 TABLET, FILM COATED ORAL at 17:03

## 2023-12-13 RX ADMIN — PANTOPRAZOLE SODIUM 40 MG: 40 TABLET, DELAYED RELEASE ORAL at 06:17

## 2023-12-13 RX ADMIN — Medication 250 MG: at 08:24

## 2023-12-13 RX ADMIN — ATOMOXETINE 40 MG: 40 CAPSULE ORAL at 08:25

## 2023-12-13 NOTE — PROGRESS NOTES
12/13/23 0817   Team Meeting   Meeting Type Daily Rounds   Initial Conference Date 12/13/23   Next Conference Date 12/14/23   Team Members Present   Team Members Present Physician;Nurse;;;Occupational Therapist   Physician Team Member Dr Smith, SHEFALI Lovell   Nursing Team Member Jennifer   Care Management Team Member Atiya   Social Work Team Member Dalia TALBOT Team Member Yeisno   Patient/Family Present   Patient Present No   Patient's Family Present No     Add Peter - pharmacy: crying about placement, higher on invega, decreasing latuda, goal is invega sustenna, irritable.

## 2023-12-13 NOTE — PLAN OF CARE
Pt. Attended groups as scheduled and although initially quiet and angry was able to calm down with divisional activity tasks when in structured task groups.  Problem: Ineffective Coping  Goal: Participates in unit activities  Description: Interventions:  - Provide therapeutic environment   - Provide required programming   - Redirect inappropriate behaviors   Outcome: Progressing

## 2023-12-13 NOTE — PLAN OF CARE
Problem: Ineffective Coping  Goal: Identifies ineffective coping skills  Outcome: Progressing  Goal: Identifies healthy coping skills  Outcome: Progressing  Goal: Demonstrates healthy coping skills  Outcome: Not Progressing  Goal: Participates in unit activities  Description: Interventions:  - Provide therapeutic environment   - Provide required programming   - Redirect inappropriate behaviors   Outcome: Progressing  Goal: Patient/Family participate in treatment and DC plans  Description: Interventions:  - Provide therapeutic environment  Outcome: Progressing  Goal: Patient/Family verbalizes awareness of resources  Outcome: Progressing  Goal: Understands least restrictive measures  Description: Interventions:  - Utilize least restrictive behavior  Outcome: Progressing  Goal: Free from restraint events  Description: - Utilize least restrictive measures   - Provide behavioral interventions   - Redirect inappropriate behaviors   Outcome: Progressing     Problem: Risk for Self Injury/Neglect  Goal: Treatment Goal: Remain safe during length of stay, learn and adopt new coping skills, and be free of self-injurious ideation, impulses and acts at the time of discharge  Outcome: Progressing  Goal: Verbalize thoughts and feelings  Description: Interventions:  - Assess and re-assess patient's lethality and potential for self-injury  - Engage patient in 1:1 interactions, daily, for a minimum of 15 minutes  - Encourage patient to express feelings, fears, frustrations, hopes  - Establish rapport/trust with patient   Outcome: Progressing  Goal: Refrain from harming self  Description: Interventions:  - Monitor patient closely, per order  - Develop a trusting relationship  - Supervise medication ingestion, monitor effects and side effects   Outcome: Progressing  Goal: Attend and participate in unit activities, including therapeutic, recreational, and educational groups  Description: Interventions:  - Provide therapeutic and  educational activities daily, encourage attendance and participation, and document same in the medical record  - Obtain collateral information, encourage visitation and family involvement in care   Outcome: Progressing  Goal: Recognize maladaptive responses and adopt new coping mechanisms  Outcome: Progressing  Goal: Complete daily ADLs, including personal hygiene independently, as able  Description: Interventions:  - Observe, teach, and assist patient with ADLS  - Monitor and promote a balance of rest/activity, with adequate nutrition and elimination  Outcome: Progressing     Problem: Depression  Goal: Treatment Goal: Demonstrate behavioral control of depressive symptoms, verbalize feelings of improved mood/affect, and adopt new coping skills prior to discharge  Outcome: Progressing  Goal: Verbalize thoughts and feelings  Description: Interventions:  - Assess and re-assess patient's level of risk   - Engage patient in 1:1 interactions, daily, for a minimum of 15 minutes   - Encourage patient to express feelings, fears, frustrations, hopes   Outcome: Progressing  Goal: Refrain from harming self  Description: Interventions:  - Monitor patient closely, per order   - Supervise medication ingestion, monitor effects and side effects   Outcome: Progressing  Goal: Refrain from isolation  Description: Interventions:  - Develop a trusting relationship   - Encourage socialization   Outcome: Progressing  Goal: Refrain from self-neglect  Outcome: Progressing  Goal: Attend and participate in unit activities, including therapeutic, recreational, and educational groups  Description: Interventions:  - Provide therapeutic and educational activities daily, encourage attendance and participation, and document same in the medical record   Outcome: Progressing  Goal: Complete daily ADLs, including personal hygiene independently, as able  Description: Interventions:  - Observe, teach, and assist patient with ADLS  -  Monitor and promote  a balance of rest/activity, with adequate nutrition and elimination   Outcome: Progressing     Problem: Anxiety  Goal: Anxiety is at manageable level  Description: Interventions:  - Assess and monitor patient's anxiety level.   - Monitor for signs and symptoms (heart palpitations, chest pain, shortness of breath, headaches, nausea, feeling jumpy, restlessness, irritable, apprehensive).   - Collaborate with interdisciplinary team and initiate plan and interventions as ordered.  - [place] patient to unit/surroundings  - Explain treatment plan  - Encourage participation in care  - Encourage verbalization of concerns/fears  - Identify coping mechanisms  - Assist in developing anxiety-reducing skills  - Administer/offer alternative therapies  - Limit or eliminate stimulants  Outcome: Progressing     Problem: Risk for Violence/Aggression Toward Others  Goal: Treatment Goal: Refrain from acts of violence/aggression during length of stay, and demonstrate improved impulse control at the time of discharge  Outcome: Progressing  Goal: Verbalize thoughts and feelings  Description: Interventions:  - Assess and re-assess patient's level of risk, every waking shift  - Engage patient in 1:1 interactions, daily, for a minimum of 15 minutes   - Allow patient to express feelings and frustrations in a safe and non-threatening manner   - Establish rapport/trust with patient   Outcome: Progressing  Goal: Refrain from harming others  Outcome: Progressing  Goal: Refrain from destructive acts on the environment or property  Outcome: Progressing  Goal: Control angry outbursts  Description: Interventions:  - Monitor patient closely, per order  - Ensure early verbal de-escalation  - Monitor prn medication needs  - Set reasonable/therapeutic limits, outline behavioral expectations, and consequences   - Provide a non-threatening milieu, utilizing the least restrictive interventions   Outcome: Not Progressing  Goal: Attend and participate in unit  activities, including therapeutic, recreational, and educational groups  Description: Interventions:  - Provide therapeutic and educational activities daily, encourage attendance and participation, and document same in the medical record   Outcome: Progressing  Goal: Identify appropriate positive anger management techniques  Description: Interventions:  - Offer anger management and coping skills groups   - Staff will provide positive feedback for appropriate anger control  Outcome: Progressing     Problem: DISCHARGE PLANNING - CARE MANAGEMENT  Goal: Discharge to post-acute care or home with appropriate resources  Description: INTERVENTIONS:  - Conduct assessment to determine patient/family and health care team treatment goals, and need for post-acute services based on payer coverage, community resources, and patient preferences, and barriers to discharge  - Address psychosocial, clinical, and financial barriers to discharge as identified in assessment in conjunction with the patient/family and health care team  - Arrange appropriate level of post-acute services according to patient’s   needs and preference and payer coverage in collaboration with the physician and health care team  - Communicate with and update the patient/family, physician, and health care team regarding progress on the discharge plan  - Arrange appropriate transportation to post-acute venues  Outcome: Progressing     Problem: SAFETY, RESTRAINT - VIOLENT/SELF-DESTRUCTIVE  Goal: Remains free of harm/injury from restraints (Restraint for Violent/Self-Destructive Behavior)  Description: INTERVENTIONS:  - Instruct patient/family regarding restraint use   - Assess and monitor physiologic and psychological status   - Provide interventions and comfort measures to meet assessed patient needs   - Ensure continuous in person monitoring is provided   - Identify and implement measures to help patient regain control  - Assess readiness for release of  restraint  Outcome: Progressing  Goal: Returns to optimal restraint-free functioning  Description: INTERVENTIONS:  - Assess the patient's behavior and symptoms that indicate continued need for restraint  - Identify and implement measures to help patient regain control  - Assess readiness for release of restraint   Outcome: Progressing

## 2023-12-13 NOTE — NURSING NOTE
Pt is visible on the unit and interacts with some peers. Pt denies anxiety, depression and SI/HIAH/VH. Pt is medication compliant and good intake at meals time. Will maintain q 7 mins checks .

## 2023-12-13 NOTE — PROGRESS NOTES
"Progress Note - Behavioral Health   Blanca Mason 53 y.o. female MRN: [id number]  Unit/Bed#: [facility] 651-02 Encounter: [id number]    Assessment/Plan   Principal Problem:    Schizoaffective disorder, bipolar type (HCC)  Active Problems:    Benign essential hypertension    Edema    Hypothyroidism    MAG (obstructive sleep apnea)    Overactive bladder    Sjogren's syndrome (HCC)    Medical clearance for psychiatric admission    Anxiety    Borderline personality disorder (HCC)    History of pulmonary embolism    Ingrown toenail      Recommended Treatment:   Antipsychotic cross-tapering: Increase Invega to 12 mg daily starting tomorrow and decrease Latuda to 20 mg starting tonight  Consideration for SNOWDEN once stable on po medications  Continue all other psychiatric medications as prescribed.  F/U Trileptal level, active in process    Continue with group therapy, milieu therapy and occupational therapy.    Continue frequent safety checks and vitals per unit protocol.  Case discussed with treatment team.  Risks, benefits and possible side effects of Medications: Risks, benefits, and possible side effects of medications have been explained to the patient, who verbalizes understanding    Current Legal Status: 201  Disposition: EAC referral  ------------------------------------------------------------    Subjective: Per nursing report, Blanca has been tearful on the unit but compliant with scheduled medications. She is attending groups and interacting with peers appropriately   PRNs: zyprexa 10 mg yesterday AM    Today, Blanca was seen and evaluated for continuity of care. On evaluation, patient is less depressed and less tearful, anxious at times. She reports feeling \"better\" this morning and that she just \"hit a bump yesterday\". Patient states she was just feeling very \"homesick\" and becomes slightly tearful and shares she was able to express her feelings with her father and he is planning to bring some pants over for patient. " "She is able to re-gather her self during interview today and apologizes for her stating \"I do not want to jeopardize my stay here\". She reports having slept better overnight (10 hrs) and had an adequate appetite for breakfast this morning.  She attributes some of her improvement in sleep to the prn medications she received and states those helped calm her down, but she did not sleep afterwards until nighttime.  Patient is holding a book and hand, titled [place] and reports she is currently trying to read this book and switch over from previous book that she cannot really follow the story line of but has been able to understand this book better.  She reports tolerating current medication regimen adequately without any side effects.  Patient agreeable to continue cross tapering antipsychotic medications.  She has been attending groups and shares that there is Pap therapy with a dog named Debra who she remembers from her previous admission.  Patient currently reports her right toe pain is tolerable with the correct fitting shoe and has helped with both the pain and positioning of her foot.  Patient denies any SI/HI/AVH at this time. She is looking forward to being home as soon as she can.     Progress Toward Goals: slow improvement    Psychiatric Review of Systems:  Behavior over the last 24 hours:  improving slowly  Sleep: normal and improving  Appetite: adequate  Medication side effects: none verbalized  ROS: Complete review of systems is negative except as noted above.    Vital signs in last 24 hours:   Temp:  [97.3 °F (36.3 °C)-97.8 °F (36.6 °C)] 97.3 °F (36.3 °C)  HR:  [] 78  Resp:  [14-18] 17  BP: (124-139)/(55-85) 139/84    Mental Status Exam:  Appearance:  alert, good eye contact, appears stated age, appropriate grooming and hygiene, and obese, NAD,  F, wearing R shoe brace for hammertoe   Behavior:  calm, cooperative, and sitting comfortably   Motor: no abnormal movements and normal " "gait and balance   Speech:  spontaneous, clear, normal rate, normal volume, and coherent   Mood:  \"better\"   Affect:  mood-congruent, less depressed, and anxious at times, less tearful   Thought Process:  Less perseverative   Thought Content: no verbalized delusions or overt paranoia, less ruminating and negative thoughts    Perceptual disturbances: no reported hallucinations and does not appear to be responding to internal stimuli at this time   Risk Potential: No active or passive suicidal or homicidal ideation was verbalized during interview   Cognition: oriented to self and situation, appears to be of average intelligence, and cognition not formally tested   Insight:  Limited, improving slowly   Judgment: Limited     Current Medications:  Current Facility-Administered Medications   Medication Dose Route Frequency Provider Last Rate    aluminum-magnesium hydroxide-simethicone  30 mL Oral Q4H PRN JHONATAN Fields      atoMOXetine  40 mg Oral Daily Anatoly Prajapati, DO      buPROPion  450 mg Oral Daily Anatoly Prajapati, DO      cloNIDine  0.1 mg Oral Q12H [facility] Anatoly Prajapati, DO      cyanocobalamin  1,000 mcg Oral Daily LAURA BergNP      cyanocobalamin  1,000 mcg Intramuscular Q30 Days JHONATAN Berg      ergocalciferol  50,000 Units Oral Weekly JHONATAN Berg      furosemide  20 mg Oral Daily Wendy Anderson, JHONATAN      hydrOXYzine HCL  25 mg Oral Q6H PRN Max 4/day Virginia Mccullough, LAURANP      hydrOXYzine HCL  50 mg Oral Q6H PRN Max 4/day Virginia Mccullough, JHONATAN      ibuprofen  200 mg Oral Q6H PRN Virginia Mccullough, CRNP      ibuprofen  400 mg Oral Q6H PRN Virginia Mccullough, CRNP      ibuprofen  600 mg Oral Q8H PRN Virginia Mccullough, JHONATAN      levothyroxine  125 mcg Oral Early Morning Wendy Anderson, JHONATAN      loperamide  2 mg Oral Q4H PRN Anatoly Prajapati, DO      LORazepam  1 mg Intramuscular Q6H PRN Max 3/day Virginia Mccullough, JHONATAN      LORazepam  1 mg Oral " Q6H PRN Max 3/day JHONATAN Anne      losartan  100 mg Oral Daily JHONATAN Fields      lurasidone  20 mg Oral Daily With Dinner Frederick Smith MD      melatonin  3 mg Oral HS Frederick Smith MD      naltrexone  50 mg Oral Daily Anatoly Prajapati DO      OLANZapine  10 mg Oral Q6H PRN Frederick Smith MD      Or    OLANZapine  10 mg Intramuscular Q6H PRN Frederick Smith MD      OLANZapine  5 mg Oral Q6H PRN Frederick Smith MD      Or    OLANZapine  5 mg Intramuscular Q6H PRN Frederick Smith MD      OLANZapine  2.5 mg Oral Q3H PRN Frederick Smith MD      OXcarbazepine  450 mg Oral Q12H TASHA Frederick Smith MD      oxybutynin  5 mg Oral BID JHONATAN Fields      [START ON 12/14/2023] paliperidone  12 mg Oral Daily Frederick Smith MD      pantoprazole  40 mg Oral Early Morning JHONATAN Fields      polyethylene glycol  17 g Oral Daily PRN JHONATAN Anne      saccharomyces boulardii  250 mg Oral BID Anatoly Prajapati,       senna-docusate sodium  1 tablet Oral Daily PRN JHONATAN Anne      topiramate  200 mg Oral BID Anatoly Prajapati,       traZODone  50 mg Oral HS PRN JHONATAN Anne      warfarin  7.5 mg Oral Daily (warfarin) JHONATAN Berg         Behavioral Health Medications: all current active meds have been reviewed. Changes as in plan section above.    Laboratory results:  I have personally reviewed all pertinent laboratory/tests results.  Recent Results (from the past 48 hour(s))   Protime-INR    Collection Time: 12/12/23  5:48 AM   Result Value Ref Range    Protime 25.8 (H) 11.6 - 14.5 seconds    INR 2.33 (H) 0.84 - 1.19        Hedy Masters DO

## 2023-12-13 NOTE — PROGRESS NOTES
12/13/23 1100 12/13/23 7200   Activity/Group Checklist   Group Life Skills  (goal setting for the new year.) Wellness  (progressive muscle relaxation.)   Attendance Attended Attended   Attendance Duration (min) 31-45 16-30   Interactions Other (Comment)  (initially sat with poor eye contact and hands in fists,then displayed ability to laugh at a staff joke and interacted spontaneously for the remainer of the session.) Interacted appropriately   Affect/Mood Wide Appropriate;Calm;Normal range   Goals Achieved Able to engage in interactions;Able to listen to others;Discussed coping strategies Able to engage in interactions;Discussed coping strategies;Able to listen to others

## 2023-12-14 PROCEDURE — 99232 SBSQ HOSP IP/OBS MODERATE 35: CPT | Performed by: STUDENT IN AN ORGANIZED HEALTH CARE EDUCATION/TRAINING PROGRAM

## 2023-12-14 RX ADMIN — HYDROXYZINE HYDROCHLORIDE 50 MG: 50 TABLET, FILM COATED ORAL at 19:13

## 2023-12-14 RX ADMIN — NALTREXONE HYDROCHLORIDE 50 MG: 50 TABLET, FILM COATED ORAL at 08:26

## 2023-12-14 RX ADMIN — LURASIDONE HYDROCHLORIDE 20 MG: 20 TABLET, FILM COATED ORAL at 17:08

## 2023-12-14 RX ADMIN — OXYBUTYNIN CHLORIDE 5 MG: 5 TABLET ORAL at 17:08

## 2023-12-14 RX ADMIN — PANTOPRAZOLE SODIUM 40 MG: 40 TABLET, DELAYED RELEASE ORAL at 05:57

## 2023-12-14 RX ADMIN — CLONIDINE HYDROCHLORIDE 0.1 MG: 0.1 TABLET ORAL at 08:26

## 2023-12-14 RX ADMIN — TRAZODONE HYDROCHLORIDE 50 MG: 50 TABLET ORAL at 22:54

## 2023-12-14 RX ADMIN — FUROSEMIDE 20 MG: 20 TABLET ORAL at 08:26

## 2023-12-14 RX ADMIN — CLONIDINE HYDROCHLORIDE 0.1 MG: 0.1 TABLET ORAL at 21:32

## 2023-12-14 RX ADMIN — CYANOCOBALAMIN TAB 1000 MCG 1000 MCG: 1000 TAB at 08:27

## 2023-12-14 RX ADMIN — LOSARTAN POTASSIUM 100 MG: 50 TABLET, FILM COATED ORAL at 08:26

## 2023-12-14 RX ADMIN — OXCARBAZEPINE 450 MG: 150 TABLET, FILM COATED ORAL at 21:32

## 2023-12-14 RX ADMIN — Medication 250 MG: at 17:08

## 2023-12-14 RX ADMIN — PALIPERIDONE 12 MG: 6 TABLET, EXTENDED RELEASE ORAL at 08:26

## 2023-12-14 RX ADMIN — TOPIRAMATE 200 MG: 100 TABLET, FILM COATED ORAL at 08:26

## 2023-12-14 RX ADMIN — OXYBUTYNIN CHLORIDE 5 MG: 5 TABLET ORAL at 08:26

## 2023-12-14 RX ADMIN — BUPROPION HYDROCHLORIDE 450 MG: 300 TABLET, EXTENDED RELEASE ORAL at 08:26

## 2023-12-14 RX ADMIN — ATOMOXETINE 40 MG: 40 CAPSULE ORAL at 08:28

## 2023-12-14 RX ADMIN — LEVOTHYROXINE SODIUM 125 MCG: 125 TABLET ORAL at 05:57

## 2023-12-14 RX ADMIN — OXCARBAZEPINE 450 MG: 150 TABLET, FILM COATED ORAL at 08:26

## 2023-12-14 RX ADMIN — WARFARIN SODIUM 7.5 MG: 5 TABLET ORAL at 17:08

## 2023-12-14 RX ADMIN — TOPIRAMATE 200 MG: 100 TABLET, FILM COATED ORAL at 17:08

## 2023-12-14 RX ADMIN — MELATONIN TAB 3 MG 3 MG: 3 TAB at 21:32

## 2023-12-14 RX ADMIN — Medication 250 MG: at 08:26

## 2023-12-14 NOTE — PROGRESS NOTES
Progress Note - Behavioral Health   Blanca Mason 53 y.o. female MRN: [id number]  Unit/Bed#: [facility] 651-02 Encounter: [id number]    Assessment/Plan   Principal Problem:    Schizoaffective disorder, bipolar type (HCC)  Active Problems:    Benign essential hypertension    Edema    Hypothyroidism    MAG (obstructive sleep apnea)    Overactive bladder    Sjogren's syndrome (HCC)    Medical clearance for psychiatric admission    Anxiety    Borderline personality disorder (HCC)    History of pulmonary embolism    Ingrown toenail      Recommended Treatment:   No psychopharmacologic changes at this time continue all psychiatric medications as prescribed.  Plan for continuing to cross taper antipsychotics from Latuda to Invega with consideration for SNOWDEN once stable on p.o. medications  Podiatry consult for left ingrown toenail   Follow-up Trileptal level, pending    Continue with group therapy, milieu therapy and occupational therapy.    Continue frequent safety checks and vitals per unit protocol.  Case discussed with treatment team.  Risks, benefits and possible side effects of Medications: Risks, benefits, and possible side effects of medications have been explained to the patient, who verbalizes understanding    Current Legal Status: 201  Disposition: EAC referral   ------------------------------------------------------------    Subjective: Per nursing report, Blanca is compliant with scheduled medications.  Patient attended groups this morning and was noted to be initially quite angry but able to calm down with group activity tasks.  She was noted to be visible progress in the evening, calm and cooperative with staff and peers.  She did endorse some depression and anxiety but denied SI/HI/AVH.  PRNs: None    Today, Blanca was seen and evaluated for continuity of care. On evaluation, patient is less depressed, less anxious and brighter than previous, noted to be smiling on approach and during interview.  She reports feeling  "\"pretty good\" this morning. Patient states she slept well, receiving 7.5 hours of sleep overnight and used her CPAP machine to check sleep duration.  She reports adequate appetite for breakfast this morning.  She describes her energy as \"pretty good\", \"not too tired\".  Patient notes of some dry mouth this morning though attributes it to her history of Sjogren's syndrome. She denies any other side effects from medications.  She denies SI/HI/AVH at this time.  She is attending groups and reports having read for about an hour last night and is currently on Chapter 6 of the [employer] book.  She reports no problems with her shoe brace, though does note some left foot pain due to ingrown toenail.  Patient reports, smiling, \"only issue I have is my toe\" and is interested in having podiatry to assist with the ingrown toenail on left foot. Patient is agreeable to SNOWDEN once stable on the p.o. medication regimen.    This writer contacted [facility] MRI department to see if it would be possible for patient to receive MRI with anesthesia/sedation during her current admission, as previously ordered for outpatient.  Per department representative, MRIs with anesthesia are not conducted at [place].    Progress Toward Goals: slow improvement    Psychiatric Review of Systems:  Behavior over the last 24 hours:  improving slowly  Sleep: normal, 7.5 hours and used CPAP overnight  Appetite: adequate  Medication side effects: none verbalized  ROS: Complete review of systems is negative except as noted above.    Vital signs in last 24 hours:   Temp:  [97.5 °F (36.4 °C)-98.6 °F (37 °C)] 97.5 °F (36.4 °C)  HR:  [] 86  Resp:  [16-18] 18  BP: (151-154)/(71-87) 152/76    Mental Status Exam:  Appearance:  alert, good eye contact, appears stated age, appropriate grooming and hygiene, obese, and smiling, book in hand   Behavior:  calm, cooperative, and sitting comfortably   Motor: normal gait and balance and some " "fidgeting   Speech:  spontaneous, clear, normal rate, normal volume, and coherent   Mood:  \"Pretty good\"   Affect:  mood-congruent, brighter than previous, and less depressed, less anxious   Thought Process:  Organized, logical, goal-directed   Thought Content: no verbalized delusions or overt paranoia   Perceptual disturbances: no reported hallucinations and does not appear to be responding to internal stimuli at this time   Risk Potential: No active or passive suicidal or homicidal ideation was verbalized during interview   Cognition: oriented to self and situation, appears to be of average intelligence, and cognition not formally tested   Insight:  Limited   Judgment: Limited     Current Medications:  Current Facility-Administered Medications   Medication Dose Route Frequency Provider Last Rate    aluminum-magnesium hydroxide-simethicone  30 mL Oral Q4H PRN Wendy Anderson, JHONATAN      atoMOXetine  40 mg Oral Daily Anatoly Prajapati, DO      buPROPion  450 mg Oral Daily Anatoly Prajapati, DO      cloNIDine  0.1 mg Oral Q12H [facility] Anatoly Prajapati, DO      cyanocobalamin  1,000 mcg Oral Daily Dianna Mcrae, CRNP      cyanocobalamin  1,000 mcg Intramuscular Q30 Days Dianna Mcrae, LAURANP      ergocalciferol  50,000 Units Oral Weekly Dianna Mcrae, LAURANP      furosemide  20 mg Oral Daily Wendy Anderson, CRNP      hydrOXYzine HCL  25 mg Oral Q6H PRN Max 4/day Virginia Mccullough, CRNP      hydrOXYzine HCL  50 mg Oral Q6H PRN Max 4/day Virginia Mccullough, CRNP      ibuprofen  200 mg Oral Q6H PRN Virginia Mccullough, CRNP      ibuprofen  400 mg Oral Q6H PRN Virginia Mccullough, CRNP      ibuprofen  600 mg Oral Q8H PRN Virginia Mccullough, CRNP      levothyroxine  125 mcg Oral Early Morning Wendy Anderson, CRNP      loperamide  2 mg Oral Q4H PRN Anatoly Prajapati, DO      LORazepam  1 mg Intramuscular Q6H PRN Max 3/day Virginia Mccullough, CRNP      LORazepam  1 mg Oral Q6H PRN Max 3/day Virginia Mccullough, " JHONATAN      losartan  100 mg Oral Daily Wendy Anderson, JHONATAN      lurasidone  20 mg Oral Daily With Dinner Frederick Smith MD      melatonin  3 mg Oral HS Frederick Smith MD      naltrexone  50 mg Oral Daily Anatoly Prajapati DO      OLANZapine  10 mg Oral Q6H PRN Frederick Smith MD      Or    OLANZapine  10 mg Intramuscular Q6H PRN Frederick Smith MD      OLANZapine  5 mg Oral Q6H PRN Frederick Smith MD      Or    OLANZapine  5 mg Intramuscular Q6H PRN Frederick Smith MD      OLANZapine  2.5 mg Oral Q3H PRN Frederick Smith MD      OXcarbazepine  450 mg Oral Q12H [facility] Frederick Smith MD      oxybutynin  5 mg Oral BID Wendy Anderson, JHONATAN      paliperidone  12 mg Oral Daily Frederick Smith MD      pantoprazole  40 mg Oral Early Morning Wnedy Anderson, JHONATAN      polyethylene glycol  17 g Oral Daily PRN JHONATAN Anne      saccharomyces boulardii  250 mg Oral BID Anatoly Prajapati DO      senna-docusate sodium  1 tablet Oral Daily PRN JHONATAN Anne      topiramate  200 mg Oral BID Anatoly Prajapati DO      traZODone  50 mg Oral HS PRN JHONATAN Anne      warfarin  7.5 mg Oral Daily (warfarin) JHONATAN Berg         Behavioral Health Medications: all current active meds have been reviewed. Changes as in plan section above.    Laboratory results:  I have personally reviewed all pertinent laboratory/tests results.  No results found for this or any previous visit (from the past 48 hour(s)).     Hedy Masters DO

## 2023-12-14 NOTE — PROGRESS NOTES
Pt. Spontaneously expressing her feelings about discharge plans.   12/14/23 1400   Activity/Group Checklist   Group Other (Comment)   Attendance Attended   Attendance Duration (min) 0-15  (1-1)   Interactions Interacted appropriately  (Pt. discussed thoughts about next housing options post D/C Pt.stated that she misses her cat /is looking forward to her Father bringing a holiday meal. Pt. with mixed emotions about not returning home soon/hopeful that her meds will make her feel better.)   Affect/Mood Wide   Goals Achieved Able to engage in interactions;Able to self-disclose;Identified feelings;Identified resources and support systems

## 2023-12-14 NOTE — NURSING NOTE
The patient has been visible most the evening. She has been calm and cooperative with staff/peers. Patient endorses some depression and anxiety. Denies SI/HI/AVH. Patient has been med compliant. Safety checks ongoing.

## 2023-12-14 NOTE — PROGRESS NOTES
12/14/23 0751   Team Meeting   Meeting Type Daily Rounds   Initial Conference Date 12/14/23   Next Conference Date 12/15/23   Team Members Present   Team Members Present Physician;Nurse;;;Occupational Therapist   Physician Team Member Dr Smith, Dr Masters, SHEFALI Lovell   Nursing Team Member Jennifer   Care Management Team Member Atiya   Social Work Team Member Dalia   OT Team Member Yeison   Patient/Family Present   Patient Present No   Patient's Family Present No     No behaviors, latuda down, invega increased, SNOWDEN soon.

## 2023-12-14 NOTE — NURSING NOTE
Pt is calm and cooperative with care. Pt is visible on the unit and social with some peers. Pt reports some depression and anxiety. Denies SI/HI/AVH. Pt is medications and meals complaint. Will maintain q 7 mins checks.

## 2023-12-15 LAB
INR PPP: 3.61 (ref 0.84–1.19)
OXCARBAZEPINE SERPL-MCNC: 14 UG/ML (ref 10–35)
PROTHROMBIN TIME: 36.2 SECONDS (ref 11.6–14.5)

## 2023-12-15 PROCEDURE — 99232 SBSQ HOSP IP/OBS MODERATE 35: CPT | Performed by: STUDENT IN AN ORGANIZED HEALTH CARE EDUCATION/TRAINING PROGRAM

## 2023-12-15 PROCEDURE — 85610 PROTHROMBIN TIME: CPT | Performed by: NURSE PRACTITIONER

## 2023-12-15 RX ADMIN — LOSARTAN POTASSIUM 100 MG: 50 TABLET, FILM COATED ORAL at 08:16

## 2023-12-15 RX ADMIN — FUROSEMIDE 20 MG: 20 TABLET ORAL at 08:15

## 2023-12-15 RX ADMIN — PANTOPRAZOLE SODIUM 40 MG: 40 TABLET, DELAYED RELEASE ORAL at 05:36

## 2023-12-15 RX ADMIN — MELATONIN TAB 3 MG 3 MG: 3 TAB at 21:38

## 2023-12-15 RX ADMIN — TOPIRAMATE 200 MG: 100 TABLET, FILM COATED ORAL at 17:03

## 2023-12-15 RX ADMIN — OXYBUTYNIN CHLORIDE 5 MG: 5 TABLET ORAL at 08:15

## 2023-12-15 RX ADMIN — Medication 250 MG: at 08:14

## 2023-12-15 RX ADMIN — CLONIDINE HYDROCHLORIDE 0.1 MG: 0.1 TABLET ORAL at 08:14

## 2023-12-15 RX ADMIN — OXCARBAZEPINE 450 MG: 150 TABLET, FILM COATED ORAL at 21:38

## 2023-12-15 RX ADMIN — PALIPERIDONE 12 MG: 6 TABLET, EXTENDED RELEASE ORAL at 08:16

## 2023-12-15 RX ADMIN — Medication 250 MG: at 17:03

## 2023-12-15 RX ADMIN — CYANOCOBALAMIN TAB 1000 MCG 1000 MCG: 1000 TAB at 08:15

## 2023-12-15 RX ADMIN — NALTREXONE HYDROCHLORIDE 50 MG: 50 TABLET, FILM COATED ORAL at 08:14

## 2023-12-15 RX ADMIN — CLONIDINE HYDROCHLORIDE 0.1 MG: 0.1 TABLET ORAL at 21:38

## 2023-12-15 RX ADMIN — BUPROPION HYDROCHLORIDE 450 MG: 300 TABLET, EXTENDED RELEASE ORAL at 08:14

## 2023-12-15 RX ADMIN — LEVOTHYROXINE SODIUM 125 MCG: 125 TABLET ORAL at 05:36

## 2023-12-15 RX ADMIN — OXYBUTYNIN CHLORIDE 5 MG: 5 TABLET ORAL at 17:03

## 2023-12-15 RX ADMIN — TOPIRAMATE 200 MG: 100 TABLET, FILM COATED ORAL at 08:16

## 2023-12-15 RX ADMIN — ATOMOXETINE 40 MG: 40 CAPSULE ORAL at 08:15

## 2023-12-15 RX ADMIN — OXCARBAZEPINE 450 MG: 150 TABLET, FILM COATED ORAL at 08:13

## 2023-12-15 NOTE — TREATMENT TEAM
"   12/14/23 1913   Dillon Anxiety Scale   Anxious Mood 3   Tension 3   Fears 3   Insomnia 0   Intellectual 3   Depressed Mood 2   Somatic Complaints: Muscular 0   Somatic Complaints: Sensory 0   Cardiovascular Symptoms 0   Respiratory Symptoms 0   Gastrointestinal Symptoms 0   Genitourinary Symptoms 0   Autonomic Symptoms 1   Behavior at Interview 2   Dillon Anxiety Score 17     Pt requesting to speak to this writer. Pt became tearful stating \"my cat is looking at the calendar\" and \"I won't get to see him.\" Pt states she is feeling anxious about her cat and about his care. Pt accepting of PRN for anxiety. PRN Atarax 50 mg administered at 1913.  "

## 2023-12-15 NOTE — NURSING NOTE
Pt visible in room this evening. Pt pleasant on initial approach, but later became tearful and anxious. Pt provided PRN that was effective. Pt stated she was anxious about her cat. Pt also reports anxiety about discharge plan. Pt denies all other psych symptoms. Pt med/meal compliant. Pts gait remains steady and the pt remains independent for ADLs. Pt is able to and encouraged to inform staff of any needs.

## 2023-12-15 NOTE — NURSING NOTE
Pt is calm, cooperative and visible on unit. Pt denies depression and anxiety; denies SI/HI/AH/VH. Pt interacts appropriately with peers and reports sleeping well after taking PRN for insomnia. Pt is able to make needs known and is medication compliant. Will maintain q7 min checks.

## 2023-12-15 NOTE — TREATMENT TEAM
Pt attended all groups and visible.  Pt able to self express and more progressively positive thinking pattens demonstrated and motivated.  Pt approached therapist requesting anger management worksheets to work on during the weekend (Provided some on self esteem too).  Social with peers and supportive.       12/15/23 1345   Activity/Group Checklist   Group Other (Comment)  (positive reflection and self esteem)   Attendance Attended   Attendance Duration (min) 46-60   Interactions Interacted appropriately   Affect/Mood Appropriate;Calm   Goals Achieved Identified feelings;Discussed coping strategies;Displayed empathy;Discussed self-esteem issues;Able to listen to others;Able to reflect/comment on own behavior;Able to engage in interactions;Able to manage/cope with feelings;Verbalized increased hopefulness;Able to self-disclose;Able to recieve feedback

## 2023-12-15 NOTE — NURSING NOTE
Pt requested PRN for insomnia. Trazodone 50 mg administered at 2254. Pt currently resting in bed with even, unlabored respirations. CPAP intact. PRN Trazodone effective.

## 2023-12-15 NOTE — PROGRESS NOTES
"Progress Note - Behavioral Health   Blanca Mason 53 y.o. female MRN: [id number]  Unit/Bed#: [facility] 651-02 Encounter: [id number]    Assessment/Plan   Principal Problem:    Schizoaffective disorder, bipolar type (HCC)  Active Problems:    Benign essential hypertension    Edema    Hypothyroidism    MAG (obstructive sleep apnea)    Overactive bladder    Sjogren's syndrome (HCC)    Medical clearance for psychiatric admission    Anxiety    Borderline personality disorder (HCC)    History of pulmonary embolism    Ingrown toenail      Recommended Treatment:   No psychopharmacologic changes at this time. Patient has been cross-tapered off of Latuda and onto Invega 12 mg po daily. Consideration to initiate SNOWDEN Invega Sustenna once stable, likely over the weekend    Continue with group therapy, milieu therapy and occupational therapy.    Continue frequent safety checks and vitals per unit protocol.  Case discussed with treatment team.  Risks, benefits and possible side effects of Medications: Risks, benefits, and possible side effects of medications have previously been explained. No new medications at this time.    Current Legal Status: 201  Disposition: EAC referral  ------------------------------------------------------------    Subjective: Per nursing report, Blanca has been calm, cooperative on the unit, social with select peers and compliant with scheduled medications. Patient did become tearful yesterday night on 2 occasions regarding her cat and endorsed some anxiety regarding cat and discharge plans. This morning, patient was calm, denied depression/anxiety. PRNs: Atarax 50 mg po and trazodone 50 mg po    Today, Blanca was seen and evaluated for continuity of care. On evaluation, patient is constricted but reactive, pleasant. She shares she was out coloring in the day room earlier and just grabbed some \"linens\" as she plans to shower later in the day. She reports feeling \"pretty good\" this morning. Patient states she " "had some difficulty falling asleep but was able to return to sleep with trazodone. She reports an adequate appetite on the unit and has been eating 3 meals/day. She continues to attend groups. She reports she did some more reading before bed last night and is now on Chapter 13. Patient reports tolerating current medication regimen without any noted side effects. She has been trying to hydrate more. She spoke with father yesterday. Patient denies SI/HI/AVH at this time. Patient reports some mild L toe pain due to ingrown toenail but reports it is tolerable at this time.     Progress Toward Goals: slow improvement    Psychiatric Review of Systems:  Behavior over the last 24 hours:  improving slowly  Sleep: difficulty falling asleep - required prn  Appetite: adequate  Medication side effects: none verbalized  ROS: Complete review of systems is negative except as noted above.    Vital signs in last 24 hours:   Temp:  [97.2 °F (36.2 °C)-97.9 °F (36.6 °C)] 97.2 °F (36.2 °C)  HR:  [78-97] 78  Resp:  [17-18] 17  BP: (133-147)/(66-93) 147/93    Mental Status Exam:  Appearance:  alert, good eye contact, appears stated age, casually dressed, appropriate grooming and hygiene, and obese, NAD,  female, dressed in bomber jacket and cookie monster pajama pants   Behavior:  calm, cooperative, and sitting comfortably   Motor: no abnormal movements and normal gait and balance   Speech:  spontaneous, clear, normal rate, normal volume, and coherent   Mood:  \"Pretty good\"   Affect:  constricted and reactive at times    Thought Process:  Organized, logical, goal-directed   Thought Content: no verbalized delusions or overt paranoia   Perceptual disturbances: no reported hallucinations and does not appear to be responding to internal stimuli at this time   Risk Potential: No active or passive suicidal or homicidal ideation was verbalized during interview   Cognition: oriented to self and situation, appears to be of average " intelligence, and cognition not formally tested   Insight:  Limited   Judgment: Limited     Current Medications:  Current Facility-Administered Medications   Medication Dose Route Frequency Provider Last Rate    aluminum-magnesium hydroxide-simethicone  30 mL Oral Q4H PRN Wendy Anderson, JHONATAN      atoMOXetine  40 mg Oral Daily Anatoly Prajapati, DO      buPROPion  450 mg Oral Daily Anatoly Prajapati, DO      cloNIDine  0.1 mg Oral Q12H [facility] Anatoly Prajapati, DO      cyanocobalamin  1,000 mcg Oral Daily Dianna Mcrae, CRNP      cyanocobalamin  1,000 mcg Intramuscular Q30 Days Dianna Mcrae, CRNP      ergocalciferol  50,000 Units Oral Weekly Dianna Mcrae, JHONATAN      furosemide  20 mg Oral Daily Wendy Anderson, LAURANP      hydrOXYzine HCL  25 mg Oral Q6H PRN Max 4/day Virginia Mccullough, CRNP      hydrOXYzine HCL  50 mg Oral Q6H PRN Max 4/day Virginia Mccullough, CRNP      ibuprofen  200 mg Oral Q6H PRN Virginia Mccullough, CRNP      ibuprofen  400 mg Oral Q6H PRN Virginia Mccullough, CRNP      ibuprofen  600 mg Oral Q8H PRN Virginia Mccullough, CRNP      levothyroxine  125 mcg Oral Early Morning Wendy Anderson, LAURANP      loperamide  2 mg Oral Q4H PRN Anatoly Prajapati, DO      LORazepam  1 mg Intramuscular Q6H PRN Max 3/day Virginia Mccullough, CRNP      LORazepam  1 mg Oral Q6H PRN Max 3/day Virginia Mccullough, LAURANP      losartan  100 mg Oral Daily Wendy Anderson, CRNP      melatonin  3 mg Oral HS Frederick Smith MD      naltrexone  50 mg Oral Daily Anatoly Prajapati, DO      OLANZapine  10 mg Oral Q6H PRN Frederick Smith MD      Or    OLANZapine  10 mg Intramuscular Q6H PRN Frederick Smith MD      OLANZapine  5 mg Oral Q6H PRN Frederick Smith MD      Or    OLANZapine  5 mg Intramuscular Q6H PRN Frederick Smith MD      OLANZapine  2.5 mg Oral Q3H PRN Frederick Smith MD      OXcarbazepine  450 mg Oral Q12H [facility] Frederick Smith MD      oxybutynin  5 mg Oral BID JHONATAN Fields       paliperidone  12 mg Oral Daily Frederick Smith MD      pantoprazole  40 mg Oral Early Morning JHONATAN Fields      polyethylene glycol  17 g Oral Daily PRN JHONATAN Anne      saccharomyces boulardii  250 mg Oral BID Anatoly Prajapati, DO      senna-docusate sodium  1 tablet Oral Daily PRN JHONATAN Anne      topiramate  200 mg Oral BID Anatoly Prajapati,       traZODone  50 mg Oral HS PRN JHONATAN Anne         Behavioral Health Medications: all current active meds have been reviewed. Changes as in plan section above.    Laboratory results:  I have personally reviewed all pertinent laboratory/tests results.  Recent Results (from the past 48 hour(s))   Protime-INR    Collection Time: 12/15/23  4:49 AM   Result Value Ref Range    Protime 36.2 (H) 11.6 - 14.5 seconds    INR 3.61 (H) 0.84 - 1.19        Hedy Masters DO

## 2023-12-15 NOTE — PLAN OF CARE
Problem: Ineffective Coping  Goal: Identifies ineffective coping skills  Outcome: Progressing  Goal: Identifies healthy coping skills  Outcome: Progressing  Goal: Demonstrates healthy coping skills  Outcome: Progressing  Goal: Participates in unit activities  Description: Interventions:  - Provide therapeutic environment   - Provide required programming   - Redirect inappropriate behaviors   Outcome: Progressing  Goal: Patient/Family participate in treatment and DC plans  Description: Interventions:  - Provide therapeutic environment  Outcome: Progressing  Goal: Patient/Family verbalizes awareness of resources  Outcome: Progressing  Goal: Understands least restrictive measures  Description: Interventions:  - Utilize least restrictive behavior  Outcome: Progressing  Goal: Free from restraint events  Description: - Utilize least restrictive measures   - Provide behavioral interventions   - Redirect inappropriate behaviors   Outcome: Progressing     Problem: Risk for Self Injury/Neglect  Goal: Treatment Goal: Remain safe during length of stay, learn and adopt new coping skills, and be free of self-injurious ideation, impulses and acts at the time of discharge  Outcome: Progressing  Goal: Verbalize thoughts and feelings  Description: Interventions:  - Assess and re-assess patient's lethality and potential for self-injury  - Engage patient in 1:1 interactions, daily, for a minimum of 15 minutes  - Encourage patient to express feelings, fears, frustrations, hopes  - Establish rapport/trust with patient   Outcome: Progressing  Goal: Refrain from harming self  Description: Interventions:  - Monitor patient closely, per order  - Develop a trusting relationship  - Supervise medication ingestion, monitor effects and side effects   Outcome: Progressing  Goal: Attend and participate in unit activities, including therapeutic, recreational, and educational groups  Description: Interventions:  - Provide therapeutic and  educational activities daily, encourage attendance and participation, and document same in the medical record  - Obtain collateral information, encourage visitation and family involvement in care   Outcome: Progressing  Goal: Recognize maladaptive responses and adopt new coping mechanisms  Outcome: Progressing  Goal: Complete daily ADLs, including personal hygiene independently, as able  Description: Interventions:  - Observe, teach, and assist patient with ADLS  - Monitor and promote a balance of rest/activity, with adequate nutrition and elimination  Outcome: Progressing     Problem: Depression  Goal: Treatment Goal: Demonstrate behavioral control of depressive symptoms, verbalize feelings of improved mood/affect, and adopt new coping skills prior to discharge  Outcome: Progressing  Goal: Verbalize thoughts and feelings  Description: Interventions:  - Assess and re-assess patient's level of risk   - Engage patient in 1:1 interactions, daily, for a minimum of 15 minutes   - Encourage patient to express feelings, fears, frustrations, hopes   Outcome: Progressing  Goal: Refrain from harming self  Description: Interventions:  - Monitor patient closely, per order   - Supervise medication ingestion, monitor effects and side effects   Outcome: Progressing  Goal: Refrain from isolation  Description: Interventions:  - Develop a trusting relationship   - Encourage socialization   Outcome: Progressing  Goal: Refrain from self-neglect  Outcome: Progressing  Goal: Attend and participate in unit activities, including therapeutic, recreational, and educational groups  Description: Interventions:  - Provide therapeutic and educational activities daily, encourage attendance and participation, and document same in the medical record   Outcome: Progressing  Goal: Complete daily ADLs, including personal hygiene independently, as able  Description: Interventions:  - Observe, teach, and assist patient with ADLS  -  Monitor and promote  a balance of rest/activity, with adequate nutrition and elimination   Outcome: Progressing     Problem: Anxiety  Goal: Anxiety is at manageable level  Description: Interventions:  - Assess and monitor patient's anxiety level.   - Monitor for signs and symptoms (heart palpitations, chest pain, shortness of breath, headaches, nausea, feeling jumpy, restlessness, irritable, apprehensive).   - Collaborate with interdisciplinary team and initiate plan and interventions as ordered.  - [place] patient to unit/surroundings  - Explain treatment plan  - Encourage participation in care  - Encourage verbalization of concerns/fears  - Identify coping mechanisms  - Assist in developing anxiety-reducing skills  - Administer/offer alternative therapies  - Limit or eliminate stimulants  Outcome: Progressing     Problem: Risk for Violence/Aggression Toward Others  Goal: Treatment Goal: Refrain from acts of violence/aggression during length of stay, and demonstrate improved impulse control at the time of discharge  Outcome: Progressing  Goal: Verbalize thoughts and feelings  Description: Interventions:  - Assess and re-assess patient's level of risk, every waking shift  - Engage patient in 1:1 interactions, daily, for a minimum of 15 minutes   - Allow patient to express feelings and frustrations in a safe and non-threatening manner   - Establish rapport/trust with patient   Outcome: Progressing  Goal: Refrain from harming others  Outcome: Progressing  Goal: Refrain from destructive acts on the environment or property  Outcome: Progressing  Goal: Control angry outbursts  Description: Interventions:  - Monitor patient closely, per order  - Ensure early verbal de-escalation  - Monitor prn medication needs  - Set reasonable/therapeutic limits, outline behavioral expectations, and consequences   - Provide a non-threatening milieu, utilizing the least restrictive interventions   Outcome: Progressing  Goal: Attend and participate in unit  activities, including therapeutic, recreational, and educational groups  Description: Interventions:  - Provide therapeutic and educational activities daily, encourage attendance and participation, and document same in the medical record   Outcome: Progressing  Goal: Identify appropriate positive anger management techniques  Description: Interventions:  - Offer anger management and coping skills groups   - Staff will provide positive feedback for appropriate anger control  Outcome: Progressing     Problem: SAFETY, RESTRAINT - VIOLENT/SELF-DESTRUCTIVE  Goal: Remains free of harm/injury from restraints (Restraint for Violent/Self-Destructive Behavior)  Description: INTERVENTIONS:  - Instruct patient/family regarding restraint use   - Assess and monitor physiologic and psychological status   - Provide interventions and comfort measures to meet assessed patient needs   - Ensure continuous in person monitoring is provided   - Identify and implement measures to help patient regain control  - Assess readiness for release of restraint  Outcome: Progressing  Goal: Returns to optimal restraint-free functioning  Description: INTERVENTIONS:  - Assess the patient's behavior and symptoms that indicate continued need for restraint  - Identify and implement measures to help patient regain control  - Assess readiness for release of restraint   Outcome: Progressing

## 2023-12-15 NOTE — PROGRESS NOTES
12/15/23 0800   Team Meeting   Meeting Type Daily Rounds   Initial Conference Date 12/15/23   Next Conference Date 12/18/23   Team Members Present   Team Members Present Physician;Nurse;;;Occupational Therapist   Physician Team Member Dr Smith, SHEFALI Coto   Nursing Team Member Jennifer   Care Management Team Member Atiya   Social Work Team Member Dalia TALBOT Team Member Yeison   Patient/Family Present   Patient Present No   Patient's Family Present No     Patient is better, less irritable, PRN atarax and crying about cat, believes her dad is bringing in a holiday meal (we cannot have homemade items). Invega sustenna 234 this weekend.

## 2023-12-15 NOTE — CASE MANAGEMENT
Patient wants a visit with her father on [place], nurse manager advised that we could do [place] denice, patient was given the list of times, she will let this writer know when he can come. Also advised that we can only allow pre-packaged foods if he plans to bring in anything for her, she expressed understanding.

## 2023-12-16 PROCEDURE — 99232 SBSQ HOSP IP/OBS MODERATE 35: CPT | Performed by: STUDENT IN AN ORGANIZED HEALTH CARE EDUCATION/TRAINING PROGRAM

## 2023-12-16 RX ADMIN — NALTREXONE HYDROCHLORIDE 50 MG: 50 TABLET, FILM COATED ORAL at 08:21

## 2023-12-16 RX ADMIN — CYANOCOBALAMIN TAB 1000 MCG 1000 MCG: 1000 TAB at 08:21

## 2023-12-16 RX ADMIN — CLONIDINE HYDROCHLORIDE 0.1 MG: 0.1 TABLET ORAL at 21:33

## 2023-12-16 RX ADMIN — OXCARBAZEPINE 450 MG: 150 TABLET, FILM COATED ORAL at 21:33

## 2023-12-16 RX ADMIN — Medication 250 MG: at 08:22

## 2023-12-16 RX ADMIN — FUROSEMIDE 20 MG: 20 TABLET ORAL at 08:22

## 2023-12-16 RX ADMIN — OXYBUTYNIN CHLORIDE 5 MG: 5 TABLET ORAL at 17:12

## 2023-12-16 RX ADMIN — OXCARBAZEPINE 450 MG: 150 TABLET, FILM COATED ORAL at 08:22

## 2023-12-16 RX ADMIN — LEVOTHYROXINE SODIUM 125 MCG: 125 TABLET ORAL at 06:02

## 2023-12-16 RX ADMIN — TOPIRAMATE 200 MG: 100 TABLET, FILM COATED ORAL at 17:12

## 2023-12-16 RX ADMIN — LOSARTAN POTASSIUM 100 MG: 50 TABLET, FILM COATED ORAL at 08:21

## 2023-12-16 RX ADMIN — CLONIDINE HYDROCHLORIDE 0.1 MG: 0.1 TABLET ORAL at 08:21

## 2023-12-16 RX ADMIN — PANTOPRAZOLE SODIUM 40 MG: 40 TABLET, DELAYED RELEASE ORAL at 06:02

## 2023-12-16 RX ADMIN — TOPIRAMATE 200 MG: 100 TABLET, FILM COATED ORAL at 08:21

## 2023-12-16 RX ADMIN — OXYBUTYNIN CHLORIDE 5 MG: 5 TABLET ORAL at 08:21

## 2023-12-16 RX ADMIN — Medication 250 MG: at 17:12

## 2023-12-16 RX ADMIN — MELATONIN TAB 3 MG 3 MG: 3 TAB at 21:33

## 2023-12-16 RX ADMIN — BUPROPION HYDROCHLORIDE 450 MG: 300 TABLET, EXTENDED RELEASE ORAL at 08:21

## 2023-12-16 RX ADMIN — ATOMOXETINE 40 MG: 40 CAPSULE ORAL at 08:22

## 2023-12-16 RX ADMIN — PALIPERIDONE 12 MG: 6 TABLET, EXTENDED RELEASE ORAL at 08:21

## 2023-12-16 NOTE — PLAN OF CARE
Problem: Risk for Self Injury/Neglect  Goal: Treatment Goal: Remain safe during length of stay, learn and adopt new coping skills, and be free of self-injurious ideation, impulses and acts at the time of discharge  Outcome: Progressing  Goal: Verbalize thoughts and feelings  Description: Interventions:  - Assess and re-assess patient's lethality and potential for self-injury  - Engage patient in 1:1 interactions, daily, for a minimum of 15 minutes  - Encourage patient to express feelings, fears, frustrations, hopes  - Establish rapport/trust with patient   Outcome: Progressing  Goal: Refrain from harming self  Description: Interventions:  - Monitor patient closely, per order  - Develop a trusting relationship  - Supervise medication ingestion, monitor effects and side effects   Outcome: Progressing  Goal: Attend and participate in unit activities, including therapeutic, recreational, and educational groups  Description: Interventions:  - Provide therapeutic and educational activities daily, encourage attendance and participation, and document same in the medical record  - Obtain collateral information, encourage visitation and family involvement in care   Outcome: Progressing  Goal: Recognize maladaptive responses and adopt new coping mechanisms  Outcome: Progressing  Goal: Complete daily ADLs, including personal hygiene independently, as able  Description: Interventions:  - Observe, teach, and assist patient with ADLS  - Monitor and promote a balance of rest/activity, with adequate nutrition and elimination  Outcome: Progressing     Problem: Depression  Goal: Treatment Goal: Demonstrate behavioral control of depressive symptoms, verbalize feelings of improved mood/affect, and adopt new coping skills prior to discharge  Outcome: Progressing  Goal: Verbalize thoughts and feelings  Description: Interventions:  - Assess and re-assess patient's level of risk   - Engage patient in 1:1 interactions, daily, for a minimum  of 15 minutes   - Encourage patient to express feelings, fears, frustrations, hopes   Outcome: Progressing  Goal: Refrain from harming self  Description: Interventions:  - Monitor patient closely, per order   - Supervise medication ingestion, monitor effects and side effects   Outcome: Progressing  Goal: Refrain from isolation  Description: Interventions:  - Develop a trusting relationship   - Encourage socialization   Outcome: Progressing  Goal: Refrain from self-neglect  Outcome: Progressing  Goal: Attend and participate in unit activities, including therapeutic, recreational, and educational groups  Description: Interventions:  - Provide therapeutic and educational activities daily, encourage attendance and participation, and document same in the medical record   Outcome: Progressing  Goal: Complete daily ADLs, including personal hygiene independently, as able  Description: Interventions:  - Observe, teach, and assist patient with ADLS  -  Monitor and promote a balance of rest/activity, with adequate nutrition and elimination   Outcome: Progressing     Problem: Anxiety  Goal: Anxiety is at manageable level  Description: Interventions:  - Assess and monitor patient's anxiety level.   - Monitor for signs and symptoms (heart palpitations, chest pain, shortness of breath, headaches, nausea, feeling jumpy, restlessness, irritable, apprehensive).   - Collaborate with interdisciplinary team and initiate plan and interventions as ordered.  - [place] patient to unit/surroundings  - Explain treatment plan  - Encourage participation in care  - Encourage verbalization of concerns/fears  - Identify coping mechanisms  - Assist in developing anxiety-reducing skills  - Administer/offer alternative therapies  - Limit or eliminate stimulants  Outcome: Progressing     Problem: Risk for Violence/Aggression Toward Others  Goal: Treatment Goal: Refrain from acts of violence/aggression during length of stay, and demonstrate improved  impulse control at the time of discharge  Outcome: Progressing  Goal: Verbalize thoughts and feelings  Description: Interventions:  - Assess and re-assess patient's level of risk, every waking shift  - Engage patient in 1:1 interactions, daily, for a minimum of 15 minutes   - Allow patient to express feelings and frustrations in a safe and non-threatening manner   - Establish rapport/trust with patient   Outcome: Progressing  Goal: Refrain from harming others  Outcome: Progressing  Goal: Refrain from destructive acts on the environment or property  Outcome: Progressing  Goal: Control angry outbursts  Description: Interventions:  - Monitor patient closely, per order  - Ensure early verbal de-escalation  - Monitor prn medication needs  - Set reasonable/therapeutic limits, outline behavioral expectations, and consequences   - Provide a non-threatening milieu, utilizing the least restrictive interventions   Outcome: Progressing  Goal: Attend and participate in unit activities, including therapeutic, recreational, and educational groups  Description: Interventions:  - Provide therapeutic and educational activities daily, encourage attendance and participation, and document same in the medical record   Outcome: Progressing  Goal: Identify appropriate positive anger management techniques  Description: Interventions:  - Offer anger management and coping skills groups   - Staff will provide positive feedback for appropriate anger control  Outcome: Progressing     Problem: SAFETY, RESTRAINT - VIOLENT/SELF-DESTRUCTIVE  Goal: Remains free of harm/injury from restraints (Restraint for Violent/Self-Destructive Behavior)  Description: INTERVENTIONS:  - Instruct patient/family regarding restraint use   - Assess and monitor physiologic and psychological status   - Provide interventions and comfort measures to meet assessed patient needs   - Ensure continuous in person monitoring is provided   - Identify and implement measures to  help patient regain control  - Assess readiness for release of restraint  Outcome: Progressing  Goal: Returns to optimal restraint-free functioning  Description: INTERVENTIONS:  - Assess the patient's behavior and symptoms that indicate continued need for restraint  - Identify and implement measures to help patient regain control  - Assess readiness for release of restraint   Outcome: Progressing

## 2023-12-16 NOTE — NURSING NOTE
"Patient calm and cooperative this shift. Visible on unit, social with peers. Compliant with meals and medications. Reports sleeping well last night as well as \"feeling better.\" Denies any needs at this time. Safety checks ongoing.   "

## 2023-12-16 NOTE — PROGRESS NOTES
"  Progress Note - Behavioral Health   Blanca Mason 53 y.o. female MRN: [id number]  Unit/Bed#: [facility] 651-02 Encounter: [id number]       Patient was visited on unit for continuing care; chart reviewed and discussed with the nursing staff. On approach, the patient was calm and superficially cooperative. She reported feeling better and thinks that Invega has been helpful. She noted that despite hearing that her father would not be able to come to the hospital to visit her on [place], she was able to adjust well, and \"did not act out\". Denied any changes in appetite, and energy level. No problem initiating and maintaining sleep. Denied A/VH currently. Denied SI/HI, intent or plan upon direct inquiry at this time. The patient agreed to verbalize any negative thoughts or concerns to the nursing staff, immediately.     Patient continues to be visible in the milieu and interacts with staff and peers. No reports of aggression or self-injurious behavior on unit. The patient has been in better behavioral control despite poor frustration tolerance when her needs not met immediately. No PRN medications used in the past 24 hours.    Patient accepted all offered medications and reported feeling better. No adverse effects of medications noted or reported.  Latuda and Haldol Dec successfully cross titrated to Invega and dose optimized to 12 mg daily.  Given the history of poor compliance, the patient is being started on [place] Invega Sustenna 23 4 mg IM tomorrow with subsequent dose of 156 mg on 12/21/23; to be continued on Invega Sustenna 234 mg IM q4 weeks. Trileptal level was 14 on 12/12/2023.  The patient continues to work on anger management during the group therapy and independently, using the worksheets.  The patient benefits from referral to [facility] given the multiple recent hospitalizations and suicide attempts and inability to stay safe and functional in the community despite being connected to the ACT services.    Current " "Mental Status Evaluation:  Appearance and attitude: appeared as stated age, casually dressed, tattooed, wearing eyeglasses, with good hygiene  Eye contact: good  Motor Function: within normal limits, intact gait, No PMA/PMR  Gait/station: normal gait/station and normal balance  Speech: normal for rate, rhythm, volume, latency, amount  Language: No overt abnormality  Mood/affect: less dysphoric, \"better\"  / Affect was constricted but reactive, mood congruent  Thought Processes: linear, concrete  Thought content: denied suicidal ideations or homicidal ideations, no overt delusions elicited, ruminations  Associations: concrete associations  Perceptual disturbances: denies Auditory/Visual/Tactile Hallucinations  Orientation: oriented to time, person, place and to the situational context  Cognitive Function: intact  Memory: not cooperative with formal MMSE  Intellect: unable to assess  Fund of knowledge: aware of current events, aware of past history, and vocabulary average  Impulse control: good  Insight/judgment: limited/limited    Vital signs in last 24 hours:    Temp:  [97.2 °F (36.2 °C)-97.4 °F (36.3 °C)] 97.3 °F (36.3 °C)  HR:  [72-90] 79  Resp:  [17-18] 17  BP: (126-137)/(76-81) 126/76    Laboratory results: I have personally reviewed all pertinent laboratory/tests results    Results from the past 24 hours: No results found for this or any previous visit (from the past 24 hour(s)).    Progress Toward Goals: making slow improvement    Assessment:  Principal Problem:    Schizoaffective disorder, bipolar type (HCC)  Active Problems:    Borderline personality disorder (HCC)    Benign essential hypertension    Edema    Hypothyroidism    MAG (obstructive sleep apnea)    Overactive bladder    Sjogren's syndrome (HCC)    Medical clearance for psychiatric admission    Anxiety    History of pulmonary embolism    Ingrown toenail        Plan:  - f/u [facility] recs regarding the medical problems  - invega Sustenna 234 mg IM " tomorrow and subsequent dose of 156 mg on 12/21/23; to be continued on Invega Sustenna 234 mg IM q 4 weeks  - Continue medication titration and treatment plan; adjust medication to optimize treatment response and as clinically indicated.     Scheduled medications:  Current Facility-Administered Medications   Medication Dose Route Frequency Provider Last Rate    aluminum-magnesium hydroxide-simethicone  30 mL Oral Q4H PRN Wendy Anderson, JHONATAN      atoMOXetine  40 mg Oral Daily Anatoly Prajapati, DO      buPROPion  450 mg Oral Daily Anatoly Prajapati, DO      cloNIDine  0.1 mg Oral Q12H TASHA Anatoly Prajapati, DO      cyanocobalamin  1,000 mcg Oral Daily Dianna Mcrae, CRNP      cyanocobalamin  1,000 mcg Intramuscular Q30 Days Dianna Mcrae, JHONATAN      ergocalciferol  50,000 Units Oral Weekly Dianna Mcrae, JHONATAN      furosemide  20 mg Oral Daily Wendy Anderson, LAURANP      hydrOXYzine HCL  25 mg Oral Q6H PRN Max 4/day Virginia Mccullough, CRNP      hydrOXYzine HCL  50 mg Oral Q6H PRN Max 4/day Virginia Mccullough, CRNP      ibuprofen  200 mg Oral Q6H PRN Virginia Mccullough, CRNP      ibuprofen  400 mg Oral Q6H PRN Virginia Mccullough, CRNP      ibuprofen  600 mg Oral Q8H PRN Virginia Mccullough, CRNP      levothyroxine  125 mcg Oral Early Morning Wendy Anderson, CRNP      loperamide  2 mg Oral Q4H PRN Anatoly Prajapati, DO      LORazepam  1 mg Intramuscular Q6H PRN Max 3/day Virginia Mccullough, CRNP      LORazepam  1 mg Oral Q6H PRN Max 3/day Virginia Mccullough, CRNP      losartan  100 mg Oral Daily Wendy Anderson, CRNP      melatonin  3 mg Oral HS Frederick Smith MD      naltrexone  50 mg Oral Daily Anatoly Prajapati, DO      OLANZapine  10 mg Oral Q6H PRN Frederick Smith MD      Or    OLANZapine  10 mg Intramuscular Q6H PRN Frederick Smith MD      OLANZapine  5 mg Oral Q6H PRN Frederick Smith MD      Or    OLANZapine  5 mg Intramuscular Q6H PRN Frederick Smith MD      OLANZapine  2.5 mg Oral Q3H PRN  Frederick Smith MD      OXcarbazepine  450 mg Oral Q12H [facility] Frederick Smith MD      oxybutynin  5 mg Oral BID JHONATAN Fields      paliperidone  12 mg Oral Daily Frederick Smith MD      [START ON 12/22/2023] paliperidone  156 mg IM- Deltoid Once Frederick Smith MD      [START ON 12/17/2023] paliperidone  234 mg IM- Deltoid Once Frederick Smith MD      [START ON 1/15/2024] paliperidone  234 mg IM- Deltoid Q4 weeks Frederick Smith MD      pantoprazole  40 mg Oral Early Morning JHONATAN Fields      polyethylene glycol  17 g Oral Daily PRN JHONATAN Anne      saccharomyces boulardii  250 mg Oral BID Anatoly Prajapati,       senna-docusate sodium  1 tablet Oral Daily PRN JHONATAN Anne      topiramate  200 mg Oral BID Anatoly Prajapati,       traZODone  50 mg Oral HS PRN JHONATAN Anne          PRN:    aluminum-magnesium hydroxide-simethicone    hydrOXYzine HCL    hydrOXYzine HCL    ibuprofen    ibuprofen    ibuprofen    loperamide    LORazepam    LORazepam    OLANZapine **OR** OLANZapine    OLANZapine **OR** OLANZapine    OLANZapine    polyethylene glycol    senna-docusate sodium    traZODone    - Observation: routine    - VS: as per unit protocol  - Diet: Regular diet  - Psychoeducation (benefits and potential risks) discussed, importance of compliance with the psychiatric treatment reiterated, and the patient verbalized understanding of the matter  - Encourage group attendance and milieu therapy    - The pt was educated and agreed to verbalize any suicidal thoughts, frustrations or concerns to the nursing staff, immediately.    - Dispo: TBD       Next of Kin  Extended Emergency Contact Information  Primary Emergency Contact: Luigi Mason  Address: [address] [place] [address] [place]  Home Phone: [phone]  Mobile Phone: [phone]  Relation: Father  Secondary Emergency Contact: Leonor Ji  Address: [address]  [place] [address] [place]  Home Phone: [phone]  Mobile Phone: [phone]  Relation: Friend      Counseling / Coordination of Care    Patient's progress reviewed with nursing staff.  Medications, treatment progress and treatment plan reviewed with patient.  Medication changes discussed with patient.  Medication education provided to patient.  Reassurance and supportive therapy provided.  Reoriented to reality and reassured.  Encouraged participation in milieu and group therapy on the unit.     Frederick Smith MD  Attending Psychiatrist   [facility]

## 2023-12-16 NOTE — NURSING NOTE
Pt cooperative and present in milieu interacts with peers appropriately. Medication compliant Appetite good. Denies SI.

## 2023-12-17 LAB
INR PPP: 1.67 (ref 0.84–1.19)
PROTHROMBIN TIME: 20 SECONDS (ref 11.6–14.5)

## 2023-12-17 PROCEDURE — 85610 PROTHROMBIN TIME: CPT | Performed by: NURSE PRACTITIONER

## 2023-12-17 PROCEDURE — 99232 SBSQ HOSP IP/OBS MODERATE 35: CPT | Performed by: STUDENT IN AN ORGANIZED HEALTH CARE EDUCATION/TRAINING PROGRAM

## 2023-12-17 RX ADMIN — TOPIRAMATE 200 MG: 100 TABLET, FILM COATED ORAL at 08:01

## 2023-12-17 RX ADMIN — TOPIRAMATE 200 MG: 100 TABLET, FILM COATED ORAL at 17:13

## 2023-12-17 RX ADMIN — OXCARBAZEPINE 450 MG: 150 TABLET, FILM COATED ORAL at 08:01

## 2023-12-17 RX ADMIN — NALTREXONE HYDROCHLORIDE 50 MG: 50 TABLET, FILM COATED ORAL at 08:01

## 2023-12-17 RX ADMIN — OXYBUTYNIN CHLORIDE 5 MG: 5 TABLET ORAL at 17:13

## 2023-12-17 RX ADMIN — CLONIDINE HYDROCHLORIDE 0.1 MG: 0.1 TABLET ORAL at 08:02

## 2023-12-17 RX ADMIN — OXCARBAZEPINE 450 MG: 150 TABLET, FILM COATED ORAL at 21:41

## 2023-12-17 RX ADMIN — PALIPERIDONE 12 MG: 6 TABLET, EXTENDED RELEASE ORAL at 08:01

## 2023-12-17 RX ADMIN — WARFARIN SODIUM 7 MG: 5 TABLET ORAL at 17:13

## 2023-12-17 RX ADMIN — PANTOPRAZOLE SODIUM 40 MG: 40 TABLET, DELAYED RELEASE ORAL at 05:31

## 2023-12-17 RX ADMIN — CLONIDINE HYDROCHLORIDE 0.1 MG: 0.1 TABLET ORAL at 21:41

## 2023-12-17 RX ADMIN — LOSARTAN POTASSIUM 100 MG: 50 TABLET, FILM COATED ORAL at 08:02

## 2023-12-17 RX ADMIN — OXYBUTYNIN CHLORIDE 5 MG: 5 TABLET ORAL at 08:00

## 2023-12-17 RX ADMIN — Medication 250 MG: at 08:01

## 2023-12-17 RX ADMIN — PALIPERIDONE PALMITATE 234 MG: 234 INJECTION INTRAMUSCULAR at 08:31

## 2023-12-17 RX ADMIN — ATOMOXETINE 40 MG: 40 CAPSULE ORAL at 08:01

## 2023-12-17 RX ADMIN — Medication 250 MG: at 17:13

## 2023-12-17 RX ADMIN — FUROSEMIDE 20 MG: 20 TABLET ORAL at 08:02

## 2023-12-17 RX ADMIN — MELATONIN TAB 3 MG 3 MG: 3 TAB at 21:41

## 2023-12-17 RX ADMIN — HYDROXYZINE HYDROCHLORIDE 50 MG: 50 TABLET, FILM COATED ORAL at 10:37

## 2023-12-17 RX ADMIN — CYANOCOBALAMIN TAB 1000 MCG 1000 MCG: 1000 TAB at 08:01

## 2023-12-17 RX ADMIN — LEVOTHYROXINE SODIUM 125 MCG: 125 TABLET ORAL at 05:31

## 2023-12-17 RX ADMIN — BUPROPION HYDROCHLORIDE 450 MG: 300 TABLET, EXTENDED RELEASE ORAL at 08:01

## 2023-12-17 RX ADMIN — LORAZEPAM 1 MG: 1 TABLET ORAL at 12:47

## 2023-12-17 NOTE — NURSING NOTE
Patient is medication and meal compliant. No complaints of pain or discomfort. Patient endorses both depression and anxiety. Patient denies current SI, AH or VH. Patient complained of increased anxiety and requested medication for same. PRN Atarax given at 1037 with effectiveness noted and decrease in anxiety reported by patient. Will continue to monitor patient for changes in mood or behavior.

## 2023-12-17 NOTE — NURSING NOTE
Patient reports severe anxiety related to her thinking about her discharge plan and our extended acute care unit. Patient requested medication for same as she was tearful and unable to cope without it. PRN Ativan given at 1247 which she said was effective with no more tearful episodes noted at this time. Will continue to monitor patient for changes in mood or behavior.

## 2023-12-17 NOTE — PLAN OF CARE
Problem: Risk for Self Injury/Neglect  Goal: Treatment Goal: Remain safe during length of stay, learn and adopt new coping skills, and be free of self-injurious ideation, impulses and acts at the time of discharge  Outcome: Progressing  Goal: Verbalize thoughts and feelings  Description: Interventions:  - Assess and re-assess patient's lethality and potential for self-injury  - Engage patient in 1:1 interactions, daily, for a minimum of 15 minutes  - Encourage patient to express feelings, fears, frustrations, hopes  - Establish rapport/trust with patient   Outcome: Progressing  Goal: Refrain from harming self  Description: Interventions:  - Monitor patient closely, per order  - Develop a trusting relationship  - Supervise medication ingestion, monitor effects and side effects   Outcome: Progressing  Goal: Attend and participate in unit activities, including therapeutic, recreational, and educational groups  Description: Interventions:  - Provide therapeutic and educational activities daily, encourage attendance and participation, and document same in the medical record  - Obtain collateral information, encourage visitation and family involvement in care   Outcome: Progressing  Goal: Recognize maladaptive responses and adopt new coping mechanisms  Outcome: Progressing  Goal: Complete daily ADLs, including personal hygiene independently, as able  Description: Interventions:  - Observe, teach, and assist patient with ADLS  - Monitor and promote a balance of rest/activity, with adequate nutrition and elimination  Outcome: Progressing     Problem: Depression  Goal: Treatment Goal: Demonstrate behavioral control of depressive symptoms, verbalize feelings of improved mood/affect, and adopt new coping skills prior to discharge  Outcome: Progressing  Goal: Verbalize thoughts and feelings  Description: Interventions:  - Assess and re-assess patient's level of risk   - Engage patient in 1:1 interactions, daily, for a minimum  of 15 minutes   - Encourage patient to express feelings, fears, frustrations, hopes   Outcome: Progressing  Goal: Refrain from harming self  Description: Interventions:  - Monitor patient closely, per order   - Supervise medication ingestion, monitor effects and side effects   Outcome: Progressing  Goal: Refrain from isolation  Description: Interventions:  - Develop a trusting relationship   - Encourage socialization   Outcome: Progressing  Goal: Refrain from self-neglect  Outcome: Progressing  Goal: Attend and participate in unit activities, including therapeutic, recreational, and educational groups  Description: Interventions:  - Provide therapeutic and educational activities daily, encourage attendance and participation, and document same in the medical record   Outcome: Progressing  Goal: Complete daily ADLs, including personal hygiene independently, as able  Description: Interventions:  - Observe, teach, and assist patient with ADLS  -  Monitor and promote a balance of rest/activity, with adequate nutrition and elimination   Outcome: Progressing     Problem: Anxiety  Goal: Anxiety is at manageable level  Description: Interventions:  - Assess and monitor patient's anxiety level.   - Monitor for signs and symptoms (heart palpitations, chest pain, shortness of breath, headaches, nausea, feeling jumpy, restlessness, irritable, apprehensive).   - Collaborate with interdisciplinary team and initiate plan and interventions as ordered.  - [place] patient to unit/surroundings  - Explain treatment plan  - Encourage participation in care  - Encourage verbalization of concerns/fears  - Identify coping mechanisms  - Assist in developing anxiety-reducing skills  - Administer/offer alternative therapies  - Limit or eliminate stimulants  Outcome: Progressing     Problem: Risk for Violence/Aggression Toward Others  Goal: Treatment Goal: Refrain from acts of violence/aggression during length of stay, and demonstrate improved  impulse control at the time of discharge  Outcome: Progressing  Goal: Verbalize thoughts and feelings  Description: Interventions:  - Assess and re-assess patient's level of risk, every waking shift  - Engage patient in 1:1 interactions, daily, for a minimum of 15 minutes   - Allow patient to express feelings and frustrations in a safe and non-threatening manner   - Establish rapport/trust with patient   Outcome: Progressing  Goal: Refrain from harming others  Outcome: Progressing  Goal: Refrain from destructive acts on the environment or property  Outcome: Progressing  Goal: Control angry outbursts  Description: Interventions:  - Monitor patient closely, per order  - Ensure early verbal de-escalation  - Monitor prn medication needs  - Set reasonable/therapeutic limits, outline behavioral expectations, and consequences   - Provide a non-threatening milieu, utilizing the least restrictive interventions   Outcome: Progressing  Goal: Attend and participate in unit activities, including therapeutic, recreational, and educational groups  Description: Interventions:  - Provide therapeutic and educational activities daily, encourage attendance and participation, and document same in the medical record   Outcome: Progressing  Goal: Identify appropriate positive anger management techniques  Description: Interventions:  - Offer anger management and coping skills groups   - Staff will provide positive feedback for appropriate anger control  Outcome: Progressing     Problem: DISCHARGE PLANNING - CARE MANAGEMENT  Goal: Discharge to post-acute care or home with appropriate resources  Description: INTERVENTIONS:  - Conduct assessment to determine patient/family and health care team treatment goals, and need for post-acute services based on payer coverage, community resources, and patient preferences, and barriers to discharge  - Address psychosocial, clinical, and financial barriers to discharge as identified in assessment in  conjunction with the patient/family and health care team  - Arrange appropriate level of post-acute services according to patient’s   needs and preference and payer coverage in collaboration with the physician and health care team  - Communicate with and update the patient/family, physician, and health care team regarding progress on the discharge plan  - Arrange appropriate transportation to post-acute venues  Outcome: Progressing     Problem: SAFETY, RESTRAINT - VIOLENT/SELF-DESTRUCTIVE  Goal: Remains free of harm/injury from restraints (Restraint for Violent/Self-Destructive Behavior)  Description: INTERVENTIONS:  - Instruct patient/family regarding restraint use   - Assess and monitor physiologic and psychological status   - Provide interventions and comfort measures to meet assessed patient needs   - Ensure continuous in person monitoring is provided   - Identify and implement measures to help patient regain control  - Assess readiness for release of restraint  Outcome: Progressing  Goal: Returns to optimal restraint-free functioning  Description: INTERVENTIONS:  - Assess the patient's behavior and symptoms that indicate continued need for restraint  - Identify and implement measures to help patient regain control  - Assess readiness for release of restraint   Outcome: Progressing

## 2023-12-17 NOTE — PROGRESS NOTES
Progress Note - Behavioral Health   Blanca Mason 53 y.o. female MRN: [id number]  Unit/Bed#: [facility] 651-02 Encounter: [id number]       Patient was visited on unit for continuing care; chart reviewed and discussed with the nursing staff. On approach, the patient was calm and superficially cooperative.  Remains preoccupied with her cat and going home.  She noted that she has found the anger management worksheets very helpful and is trying to use the coping skills.  Denied any changes in mood, appetite, and energy level.  Reported interrupted sleep last night as her roommate was sick and up most of the night, as per patient.  Denied A/VH currently. Denied SI/HI, intent or plan upon direct inquiry at this time.    Patient continues to be visible in the milieu and interacts with staff and peers.the patient continues to have poor frustration tolerance when her needs not met immediately but has been in better behavioral control.  No reports of aggression or self-injurious behavior on unit.  Received Atarax p.o. as needed at 1037 for anxiety.    Patient accepted all offered medications and reported feeling better. No adverse effects of medications noted or reported. Latuda and Haldol Dec successfully cross titrated to Invega and dose optimized to 12 mg daily.  Given the history of poor compliance, the patient was started on SNOWDEN Invega Sustenna 234 mg IM (received first dose today) with subsequent dose of 156 mg on 12/21/23; to be continued on Invega Sustenna 234 mg IM q4 weeks. Trileptal level was 14 on 12/12/2023.  The patient continues to work on anger management during the group therapy and independently, using the worksheets.  The patient benefits from referral to [facility] given the multiple recent hospitalizations and suicide attempts and inability to stay safe and functional in the community despite being connected to the ACT services.    Current Mental Status Evaluation:  Appearance and attitude: appeared as stated  "age, casually dressed, tattooed, wearing eyeglasses, with good hygiene  Eye contact: good  Motor Function: within normal limits, intact gait, No PMA/PMR  Gait/station: normal gait/station and normal balance  Speech: talking in soft tone, with normal latency and amount  Language: No overt abnormality  Mood/affect:  \"better\"  / Affect was constricted but reactive, mood congruent  Thought Processes: ruminating  Thought content: denied suicidal ideations or homicidal ideations, no overt delusions elicited, ruminating thoughts  Associations: concrete associations  Perceptual disturbances: denies Auditory/Visual/Tactile Hallucinations  Orientation: oriented to time, person, place and to the situational context  Cognitive Function: intact  Memory: recent and remote memory grossly intact  Intellect: unable to assess  Fund of knowledge: aware of current events, aware of past history, and vocabulary average  Impulse control: good  Insight/judgment: limited/limited    Vital signs in last 24 hours:    Temp:  [97.1 °F (36.2 °C)-97.8 °F (36.6 °C)] 97.1 °F (36.2 °C)  HR:  [85-93] 85  Resp:  [15-18] 15  BP: (129-143)/(72-94) 143/81    Laboratory results: I have personally reviewed all pertinent laboratory/tests results    Results from the past 24 hours: No results found for this or any previous visit (from the past 24 hour(s)).    Progress Toward Goals: making slow improvement    Assessment:  Principal Problem:    Schizoaffective disorder, bipolar type (HCC)  Active Problems:    Borderline personality disorder (HCC)    Benign essential hypertension    Edema    Hypothyroidism    MAG (obstructive sleep apnea)    Overactive bladder    Sjogren's syndrome (HCC)    Medical clearance for psychiatric admission    Anxiety    History of pulmonary embolism    Ingrown toenail        Plan:  - f/u [facility] recs regarding the medical problems  - Invega Sustenna 234 mg IM today and subsequent dose of 156 mg on 12/21/2023; to be continued on Invega " Sustenna 234 mg IM every 4 weeks  - Continue medication titration and treatment plan; adjust medication to optimize treatment response and as clinically indicated.     Scheduled medications:  Current Facility-Administered Medications   Medication Dose Route Frequency Provider Last Rate    aluminum-magnesium hydroxide-simethicone  30 mL Oral Q4H PRN Wendy Anderson, JHONATAN      atoMOXetine  40 mg Oral Daily Anatoly Prajapati, DO      buPROPion  450 mg Oral Daily Anatoly Prajapati, DO      cloNIDine  0.1 mg Oral Q12H [facility]1983 Anatoly Prajapati, DO      cyanocobalamin  1,000 mcg Oral Daily Dianna Mcrae, CRNP      cyanocobalamin  1,000 mcg Intramuscular Q30 Days Dianna Mcrae, JHONATAN      ergocalciferol  50,000 Units Oral Weekly Dianna Mcrea, JHONATAN      furosemide  20 mg Oral Daily Wendy Anderson, CRNP      hydrOXYzine HCL  25 mg Oral Q6H PRN Max 4/day Virginia Mccullough, CRNP      hydrOXYzine HCL  50 mg Oral Q6H PRN Max 4/day Virginia Mccullough, CRNP      ibuprofen  200 mg Oral Q6H PRN Virginia Mccullough, CRNP      ibuprofen  400 mg Oral Q6H PRN Virginia Mccullough, CRNP      ibuprofen  600 mg Oral Q8H PRN Virginia Mccullough, CRNP      levothyroxine  125 mcg Oral Early Morning Wendy Anderson, CRNP      loperamide  2 mg Oral Q4H PRN Anatoly Prajapati, DO      LORazepam  1 mg Intramuscular Q6H PRN Max 3/day Virginia Mccullough, CRNP      LORazepam  1 mg Oral Q6H PRN Max 3/day Virginia Mccullough, LAURANP      losartan  100 mg Oral Daily Wendy Anderson, CRNP      melatonin  3 mg Oral HS Frederick Smith MD      naltrexone  50 mg Oral Daily Anatoly Prajapati, DO      OLANZapine  10 mg Oral Q6H PRN Frederick Smith MD      Or    OLANZapine  10 mg Intramuscular Q6H PRN Frederick Smith MD      OLANZapine  5 mg Oral Q6H PRN Frederick Smith MD      Or    OLANZapine  5 mg Intramuscular Q6H PRN Frederick Smith MD      OLANZapine  2.5 mg Oral Q3H PRN Frederick Smith MD      OXcarbazepine  450 mg Oral Q12H [facility] Frederick Smith,  MD      oxybutynin  5 mg Oral BID JHONATAN Fields      paliperidone  12 mg Oral Daily Frederick Smith MD      [START ON 12/21/2023] paliperidone  156 mg IM- Deltoid Once Frederick Smith MD      [START ON 1/15/2024] paliperidone  234 mg IM- Deltoid Q4 weeks Frederick Smith MD      pantoprazole  40 mg Oral Early Morning JHONATAN Fields      polyethylene glycol  17 g Oral Daily PRN JHONATAN Anne      saccharomyces boulardii  250 mg Oral BID Anatoly Prajapati, DO      senna-docusate sodium  1 tablet Oral Daily PRN JHONATAN Anne      topiramate  200 mg Oral BID Anatoly Prajapati, DO      traZODone  50 mg Oral HS PRN JHONATAN Anne          PRN:    aluminum-magnesium hydroxide-simethicone    hydrOXYzine HCL    hydrOXYzine HCL    ibuprofen    ibuprofen    ibuprofen    loperamide    LORazepam    LORazepam    OLANZapine **OR** OLANZapine    OLANZapine **OR** OLANZapine    OLANZapine    polyethylene glycol    senna-docusate sodium    traZODone    - Observation: routine    - VS: as per unit protocol  - Diet: Regular diet  - Psychoeducation (benefits and potential risks) discussed, importance of compliance with the psychiatric treatment reiterated, and the patient verbalized understanding of the matter  - Encourage group attendance and milieu therapy    - The pt was educated and agreed to verbalize any suicidal thoughts, frustrations or concerns to the nursing staff, immediately.    - Dispo: TBD       Next of Kin  Extended Emergency Contact Information  Primary Emergency Contact: Luigi Mason  Address: [address] [place] [address] [place]  Home Phone: [phone]  Mobile Phone: [phone]  Relation: Father  Secondary Emergency Contact: Leonro Ji  Address: [address] [place] [address] [facility] [place]  Home Phone: [phone]  Mobile Phone: [phone]  Relation: Friend      Counseling / Coordination  of Care    Patient's progress reviewed with nursing staff.  Medications, treatment progress and treatment plan reviewed with patient.  Medication changes discussed with patient.  Medication education provided to patient.  Reassurance and supportive therapy provided.  Reoriented to reality and reassured.  Encouraged participation in milieu and group therapy on the unit.     Frederick Smith MD  Attending Psychiatrist   [facility]

## 2023-12-18 LAB
INR PPP: 1.36 (ref 0.84–1.19)
PROTHROMBIN TIME: 17.1 SECONDS (ref 11.6–14.5)

## 2023-12-18 PROCEDURE — 99232 SBSQ HOSP IP/OBS MODERATE 35: CPT | Performed by: STUDENT IN AN ORGANIZED HEALTH CARE EDUCATION/TRAINING PROGRAM

## 2023-12-18 PROCEDURE — 85610 PROTHROMBIN TIME: CPT | Performed by: NURSE PRACTITIONER

## 2023-12-18 RX ADMIN — ATOMOXETINE 40 MG: 40 CAPSULE ORAL at 08:31

## 2023-12-18 RX ADMIN — CLONIDINE HYDROCHLORIDE 0.1 MG: 0.1 TABLET ORAL at 08:30

## 2023-12-18 RX ADMIN — MELATONIN TAB 3 MG 3 MG: 3 TAB at 21:41

## 2023-12-18 RX ADMIN — Medication 250 MG: at 08:31

## 2023-12-18 RX ADMIN — WARFARIN SODIUM 7 MG: 5 TABLET ORAL at 17:32

## 2023-12-18 RX ADMIN — BUPROPION HYDROCHLORIDE 450 MG: 300 TABLET, EXTENDED RELEASE ORAL at 08:30

## 2023-12-18 RX ADMIN — PANTOPRAZOLE SODIUM 40 MG: 40 TABLET, DELAYED RELEASE ORAL at 06:07

## 2023-12-18 RX ADMIN — LOSARTAN POTASSIUM 100 MG: 50 TABLET, FILM COATED ORAL at 08:30

## 2023-12-18 RX ADMIN — Medication 250 MG: at 17:06

## 2023-12-18 RX ADMIN — IBUPROFEN 400 MG: 400 TABLET ORAL at 18:20

## 2023-12-18 RX ADMIN — PALIPERIDONE 12 MG: 6 TABLET, EXTENDED RELEASE ORAL at 08:30

## 2023-12-18 RX ADMIN — OXYBUTYNIN CHLORIDE 5 MG: 5 TABLET ORAL at 17:32

## 2023-12-18 RX ADMIN — LEVOTHYROXINE SODIUM 125 MCG: 125 TABLET ORAL at 06:07

## 2023-12-18 RX ADMIN — OXCARBAZEPINE 450 MG: 150 TABLET, FILM COATED ORAL at 21:41

## 2023-12-18 RX ADMIN — CYANOCOBALAMIN TAB 1000 MCG 1000 MCG: 1000 TAB at 08:30

## 2023-12-18 RX ADMIN — OXCARBAZEPINE 450 MG: 150 TABLET, FILM COATED ORAL at 08:30

## 2023-12-18 RX ADMIN — OXYBUTYNIN CHLORIDE 5 MG: 5 TABLET ORAL at 08:30

## 2023-12-18 RX ADMIN — ERGOCALCIFEROL 50000 UNITS: 1.25 CAPSULE ORAL at 08:31

## 2023-12-18 RX ADMIN — TOPIRAMATE 200 MG: 100 TABLET, FILM COATED ORAL at 08:30

## 2023-12-18 RX ADMIN — FUROSEMIDE 20 MG: 20 TABLET ORAL at 08:30

## 2023-12-18 RX ADMIN — NALTREXONE HYDROCHLORIDE 50 MG: 50 TABLET, FILM COATED ORAL at 08:30

## 2023-12-18 RX ADMIN — CLONIDINE HYDROCHLORIDE 0.1 MG: 0.1 TABLET ORAL at 21:41

## 2023-12-18 RX ADMIN — TOPIRAMATE 200 MG: 100 TABLET, FILM COATED ORAL at 17:06

## 2023-12-18 NOTE — PROGRESS NOTES
Progress Note - Behavioral Health   Blanca Mason 53 y.o. female MRN: [id number]  Unit/Bed#: [facility] 651-02 Encounter: [id number]    Patient was seen today for continuation of care, records reviewed and patient was discussed with the morning case review team.    Blanca was seen today for psychiatric follow-up.  On assessment today, Blanca was labile and tearful.  Stating that she feels very homesick and misses her cats, patient then apologized to this writer about crying and states that she just cannot stop.  No changes in sleep or appetite reported, patient continues to be in better behavioral control with no agitation or aggression noted by staff.  As needed Atarax given at 1247 and effective, patient is encouraged to use coping mechanisms to help with breakthrough symptoms.  Cross titration from Latuda to Invega successful with no adverse effects.  SNOWDEN Invega Sustenna 234 mg IM to be continued q. 28 days post administration on 12/21/2023.  No SI/HI currently reported.  No AVH.  Patient continues to need inpatient hospitalization secondary to increased risk of suicidality and multiple admissions despite ACT services in the community.  We will place referral to [facility].     Blanca denies acute suicidal/self-harm ideation/intent/plan upon direct inquiry at this time.  Blanca remains behaviorally appropriate, no agitation or aggression noted on exam or in report.  Blanca also denies HI/AH/VH, and does not appear overtly manic.  No overt delusions or paranoia are verbalized. Impulse control is intact.  Blanca remains adherent to her current psychotropic medication regimen and denies any side effects from medications, as well as none noted on exam.    Vitals:  Vitals:    12/18/23 0758   BP: 135/83   Pulse: 90   Resp: 14   Temp: 98 °F (36.7 °C)   SpO2: 93%       Laboratory Results:  I have personally reviewed all pertinent laboratory/tests results.  Most Recent Labs:   Lab Results   Component Value Date    WBC 8.96 12/02/2023    RBC  4.34 12/02/2023    HGB 12.9 12/02/2023    HCT 40.5 12/02/2023     12/02/2023    RDW 14.2 12/02/2023    TOTANEUTABS 4.73 11/19/2023    NEUTROABS 5.77 12/02/2023    SODIUM 139 12/02/2023    K 3.8 12/02/2023     12/02/2023    CO2 21 12/02/2023    BUN 13 12/02/2023    CREATININE 0.62 12/02/2023    GLUC 107 12/02/2023    GLUF 107 (H) 12/02/2023    CALCIUM 8.7 12/02/2023    AST 13 12/02/2023    ALT 13 12/02/2023    ALKPHOS 55 12/02/2023    TP 6.0 (L) 12/02/2023    ALB 3.5 12/02/2023    TBILI 0.25 12/02/2023    CHOLESTEROL 234 (H) 03/09/2023    HDL 71 03/09/2023    TRIG 153 (H) 03/09/2023    LDLCALC 132 (H) 03/09/2023    NONHDLC 163 03/09/2023    VALPROICTOT 23 (L) 11/10/2023    AMMONIA 63 11/06/2023    [id number] 4.324 12/02/2023    FREET4 0.81 11/06/2023    T3FREE 2.27 (L) 10/16/2019    PREGUR negative 07/13/2022    PREGSERUM Negative 11/06/2023    RPR Non-Reactive 07/17/2022    HGBA1C 5.0 01/05/2023    EAG 97 01/05/2023       Psychiatric Review of Systems:  Behavior over the last 24 hours:  unchanged.   Sleep: good  Appetite: good  Medication side effects: none  ROS: no complaints, denies shortness of breath or chest pain and all other systems are negative for acute changes    Mental Status Evaluation:  Appearance:  dressed appropriately, adequate grooming   Behavior:  guarded, still at times agitated, superficially cooperative   Speech:  normal rate and volume   Mood:  depressed, labile, irritable   Affect:  constricted   Thought Process:  linear   Thought Content:  no overt delusions   Perceptual Disturbances: denies auditory hallucinations when asked, does not appear responding to internal stimuli   Risk Potential: Suicidal ideation - None  Homicidal ideation - None  Potential for aggression - No   Memory:  recent and remote memory grossly intact   Sensorium  person, place, and time/date      Consciousness:  alert and awake   Attention: attention span and concentration are age appropriate   Insight:   limited   Judgment: limited   Gait/Station: normal gait/station   Motor Activity: no abnormal movements   Progress Toward Goals:   Blanca is progressing towards goals of inpatient psychiatric treatment by continued medication compliance and is attending therapeutic modalities on the milieu. However, the patient continues to require inpatient psychiatric hospitalization for continued medication management and titration to optimize symptom reduction, improve sleep hygiene, and demonstrate adequate self-care.    Assessment/Plan   Principal Problem:    Schizoaffective disorder, bipolar type ([facility])  Active Problems:    Benign essential hypertension    Edema    Hypothyroidism    MAG (obstructive sleep apnea)    Overactive bladder    Sjogren's syndrome ([facility])    Medical clearance for psychiatric admission    Anxiety    Borderline personality disorder ([facility])    History of pulmonary embolism    Ingrown toenail      Recommended Treatment: Treatment plan and medication changes discussed and per the attending physician the plan is:    1.Continue with group therapy, milieu therapy and occupational therapy  2.Behavioral Health checks every 7 minutes  3.Continue frequent safety checks and vitals per unit protocol  4.Continue with [facility] medical management as indicated  5.Continue with current medication regimen  6.Will review labs in the a.m.  7.Disposition Planning: Discharge planning and efforts remain ongoing    Behavioral Health Medications: all current active meds have been reviewed and continue current psychiatric medications.  Current Facility-Administered Medications   Medication Dose Route Frequency Provider Last Rate    aluminum-magnesium hydroxide-simethicone  30 mL Oral Q4H PRN JHONATAN Fields      atoMOXetine  40 mg Oral Daily Anatoly Prajapati, DO      buPROPion  450 mg Oral Daily Anatoly Prajapati, DO      cloNIDine  0.1 mg Oral Q12H [facility] Anatoly Prajapati, DO      cyanocobalamin  1,000 mcg Oral Daily Dianna  Thor, CRNP      cyanocobalamin  1,000 mcg Intramuscular Q30 Days Dianna Mcrae, CRNP      ergocalciferol  50,000 Units Oral Weekly Dianna Mcrae, CRNP      furosemide  20 mg Oral Daily Wendy Anderson, CRNP      hydrOXYzine HCL  25 mg Oral Q6H PRN Max 4/day Virginia Mccullough, CRNP      hydrOXYzine HCL  50 mg Oral Q6H PRN Max 4/day Virginia Mccullough, CRNP      ibuprofen  200 mg Oral Q6H PRN Virginia Mccullough, CRNP      ibuprofen  400 mg Oral Q6H PRN Virginia Mccullough, CRNP      ibuprofen  600 mg Oral Q8H PRN Virginia Mccullough, CRNP      levothyroxine  125 mcg Oral Early Morning Wendy Anderson, CRNP      loperamide  2 mg Oral Q4H PRN Anatoly Prajapati, DO      LORazepam  1 mg Intramuscular Q6H PRN Max 3/day Virginia Mccullough, CRNP      LORazepam  1 mg Oral Q6H PRN Max 3/day Virginia Mccullough, CRNP      losartan  100 mg Oral Daily Wendy Anderson, CRNP      melatonin  3 mg Oral HS Frederick Smith MD      naltrexone  50 mg Oral Daily Anatoly Prajapati, DO      OLANZapine  10 mg Oral Q6H PRN Frederick Smith MD      Or    OLANZapine  10 mg Intramuscular Q6H PRN Frederick Smith MD      OLANZapine  5 mg Oral Q6H PRN Frederick Smith MD      Or    OLANZapine  5 mg Intramuscular Q6H PRN Frederick Smith MD      OLANZapine  2.5 mg Oral Q3H PRN Frederick Smith MD      OXcarbazepine  450 mg Oral Q12H TASHA Frederick Smith MD      oxybutynin  5 mg Oral BID Wendy Anderson, CRNP      paliperidone  12 mg Oral Daily Frederick Smith MD      [START ON 12/21/2023] paliperidone  156 mg IM- Deltoid Once Frederick Smith MD      [START ON 1/15/2024] paliperidone  234 mg IM- Deltoid Q4 weeks Frederick Smith MD      pantoprazole  40 mg Oral Early Morning Wendy Anderson, LAURANP      polyethylene glycol  17 g Oral Daily PRN Virginia Mccullough, CRNP      saccharomyces boulardii  250 mg Oral BID DO troy Martel-docusate sodium  1 tablet Oral Daily PRN JHONATAN Anne      topiramate  200 mg Oral BID  Anatoly Prajapati DO      traZODone  50 mg Oral HS PRN JHONATAN Anne      warfarin  7 mg Oral Daily (warfarin) JHONATAN Berg         Risks / Benefits of Treatment:  Risks, benefits, and possible side effects of medications explained to patient and patient verbalizes understanding and agreement for treatment.    Counseling / Coordination of Care:  Patient's progress reviewed with nursing staff.  Medications, treatment progress and treatment plan reviewed with patient.  Supportive counseling provided to the patient.    Total floor/unit time spent today 25 minutes. Greater than 50% of total time was spent with the patient and / or family counseling and / or coordination of care. A description of the counseling / coordination of care: medication education, treatment plan, supportive therapy.    This note was completed in part utilizing Dragon dictation Software. Grammatical, translation, syntax errors, random word insertions, spelling mistakes, and incomplete sentences may be an occasional consequence of this system secondary to software limitations with voice recognition, ambient noise, and hardware issues. If you have any questions or concerns about the content, text, or information contained within the body of this dictation, please contact the provider for clarification

## 2023-12-18 NOTE — NURSING NOTE
Pt constricted but social with select peers.  VSS.  Med-compliant.  Good appetite and steady gait.  Denied SI.  Did not attend group.  Pt redirected for demanding to have female peer removed from dayroom for RIS.  Monitored for safety and support.

## 2023-12-18 NOTE — CONSULTS
[facility] - Consultation    Patient Information:   Blanca Mason 53 y.o. female MRN: [id number]  Unit/Bed#: -02 Encounter: [id number]  PCP: JHONATAN Armando  Date of Admission:  12/1/2023  Date of Consultation: 12/18/23  Requesting Physician: Frederick Smith MD      ASSESSMENT:    Blanca Mason is a 53 y.o. female with:    Right second hammertoe deformity, stable  Right foot pain  Right hallux lateral nail border ingrown toenail status post partial nail avulsion, resolved  Onychomycosis x10  Painful toes B/L    PLAN:    Patient was seen and evaluated bedside with all questions and concerns addressed  Performed excisional debridement of painful mycotic toenails x10, reducing both length and thickness. Patient tolerated well.  R foot x-rays reviewed, no evidence of fracture. There is digital contractures noted consistent with the hammertoe deformity seen on examination.   Patient is weightbearing as tolerated to the right foot in the postop shoe.  Patient can take ibuprofen 600 mg p.o. every 6 hours as needed pain  No indications for antibiotics or surgery from podiatry standpoint.  Right hallux ingrown toenail is resolved, no need to apply any dressing to the site  Rest of care per primary team.      SUBJECTIVE    History of Present Illness:    Blanca Mason is a 53 y.o. female with past medical history of hypertension, hypothyroidism, anxiety, pulmonary embolism, and bipolar who presents with right second toe pain and painful toenails x10.  Current pain is a 4/10.  States that her toenails keep getting caught on her socks. Denies any pain to her right hallux ingrown toenail site.  Denies any other lower extremity complaints at this time.    Review of Systems:    12 point review of systems is negative unless otherwise specified in the above HPI.    Past Medical and Surgical History:     Past Medical History:   Diagnosis Date    Anxiety     Arthritis     oa cassandra knees    Asthma      "good control- no medications    Yan's esophagus     Bipolar affective disorder (HCC)     Cannabis abuse with unspecified 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HCC)     Chronic pain disorder     lumbar    COPD (chronic obstructive pulmonary disease) (HCC)     CPAP (continuous positive airway pressure) dependence     DDD (degenerative disc disease), lumbar 04/09/2015    Degenerative disc disease at L5-S1 level     Deliberate self-cutting     9/15/22per pt has not done recently-- does see a therapist and psychiatrist regularly    Depression 09/16/2008    Disease of thyroid gland     hypo    MARTINEZ (dyspnea on exertion)     per pt \"has had this with exertion and not new\"    Drug overdose 10/28/2008    suicide attempt and again drug overdose 7/2022-- [facility] floor and than transferred to \[facility]\"" Psych    Dysphagia     Dyspnea     Edema     BLE    Family history of blood clots     GERD (gastroesophageal reflux disease)     Headache(784.0)     Headache, tension-type     Hemorrhage of rectum and anus 10/02/2017    Formatting of this note might be different from the original.  Added automatically from request for surgery [id number]    High blood pressure     History of COVID-19 12/30/2020    Symptoms started on 12/30/2020 and then got worse.  Today she is feeling a little bit better.  She is a candidate for monoclonal antibody infusion and set for 1/6/21 @ 1pm at [facility]. 01/07/21 - telemedicine -     Hyperlipidemia     Hypertension     Knee pain, bilateral     Especially right    Medial epicondylitis of elbow, left 04/03/2018    Formatting of this note might be different from the original.  Added automatically from request for surgery [id number]    Medial epicondylitis of right elbow 07/17/2023    Medical marijuana use     Memory loss     Migraines     Obesity     Overactive bladder     Primary osteoarthritis of both knees 08/08/2018    Pulmonary emboli (HCC)     Restrictive lung disease 02/01/2017    Last Assessment & Plan:   " Formatting of this note might be different from the original.  I reviewed this problem throughout the rest of the note. Please refer to the other assessments for details.    Sjogren's disease (HCC)     Sleep apnea     Stress incontinence     Suicidal ideations     Teeth missing     Tremor     per pt Tremors of Right Leg and both Arms    Visual impairment     Wears glasses        Past Surgical History:   Procedure Laterality Date    BREAST CYST EXCISION Right 1989    CARPAL TUNNEL RELEASE Left     CHOLECYSTECTOMY  05/2003    Laparoscopic    COLONOSCOPY      01/12/2009    DILATION AND CURETTAGE OF UTERUS      ELBOW SURGERY Left     x2. No hardware    ESOPHAGOGASTRODUODENOSCOPY      FOOT SURGERY Left     Plantar fasciotomy    HERNIA REPAIR      HYSTERECTOMY      laporoscopic, partial    KNEE ARTHROSCOPY Bilateral     OOPHORECTOMY Left 10/2015    NY GASTROCNEMIUS RECESSION Left 02/24/2021    Procedure: RECESSION GASTROC OPEN;  Surgeon: Nomi Yang DPM;  Location:  MAIN OR;  Service: Podiatry    NY RPR UMBILICAL HRNA 5 YRS/> REDUCIBLE N/A 04/24/2019    Procedure: REPAIR HERNIA UMBILICAL LAPAROSCOPIC W/ ROBOTICS;  Surgeon: Anahi Colon MD;  Location: [facility] Main OR;  Service: General    NY SPHINCTEROTOMY ANAL DIVISION SPHINCTER SPX N/A 08/31/2018    Procedure: EUA, LEFT PARTIAL INTERNAL SPHINCTEROTOMY;  Surgeon: Tien Reyes MD;  Location:  MAIN OR;  Service: Colorectal    NY TNOT ELBOW LATERAL/MEDIAL DEBRIDE OPEN TDN RPR Left 09/20/2022    Procedure: RELEASE EPICONDYLAR ELBOW MEDIAL;  Surgeon: Kyree Winkler MD;  Location: [facility] MAIN OR;  Service: Orthopedics    REDUCTION MAMMAPLASTY Bilateral 1999    REPAIR LACERATION Left     left hand-5/18/2009    REPLACEMENT TOTAL KNEE Right     ROTATOR CUFF REPAIR Left     TONSILECTOMY AND ADNOIDECTOMY      US GUIDED MSK PROCEDURE  04/22/2021    VASCULAR SURGERY      VEIN LIGATION Bilateral     WISDOM TOOTH EXTRACTION         Meds/Allergies:    Medications Prior to  "Admission   Medication    atoMOXetine (STRATTERA) 40 mg capsule    buPROPion (WELLBUTRIN XL) 300 mg 24 hr tablet    cholecalciferol (VITAMIN D3) 1,000 units tablet    cloNIDine (CATAPRES) 0.1 mg tablet    furosemide (LASIX) 20 mg tablet    levothyroxine (Euthyrox) 125 mcg tablet    losartan (COZAAR) 100 MG tablet    lurasidone 60 MG TABS    naltrexone (REVIA) 50 mg tablet    OXcarbazepine (TRILEPTAL) 300 mg tablet    RABEprazole (ACIPHEX) 20 MG tablet    rOPINIRole (REQUIP) 1 mg tablet    rOPINIRole (REQUIP) 2 mg tablet    topiramate (TOPAMAX) 200 MG tablet    trospium chloride (SANCTURA) 20 mg tablet    warfarin (COUMADIN) 5 mg tablet    cephalexin (KEFLEX) 500 mg capsule    haloperidol decanoate (Haldol Decanoate) 50 mg/mL injection    melatonin 3 mg       Allergies   Allergen Reactions    Percocet [Oxycodone-Acetaminophen] Headache     Severe headaches   (denies issues with Tylenol)    Povidone Iodine Rash     Unsure if betadine or gauze post surgical. Got rash on leg.   Has  Had itchy rashes after every surgery prep and IV insertion    Chlorhexidine Rash     Per pt \"able to use the liquid soap--thinkd reaction from the sponges or wipes\"       Social History:     Marital Status: Single    Substance Use History:   Social History     Substance and Sexual Activity   Alcohol Use Not Currently    Comment: Recovering alcoholic     Social History     Tobacco Use   Smoking Status Former    Current packs/day: 0.00    Average packs/day: 2.0 packs/day for 33.0 years (66.0 ttl pk-yrs)    Types: Cigarettes    Start date: 1985    Quit date: 2018    Years since quittin.9   Smokeless Tobacco Never   Tobacco Comments    Started at age 16.     Social History     Substance and Sexual Activity   Drug Use Yes    Types: Marijuana, Methamphetamines    Comment: 2 months off meth       Family History:    Family History   Problem Relation Age of Onset    Kidney cancer Mother     Diabetes Mother     Depression Mother     Stroke " "Mother     Arthritis Mother     Cancer Mother     Psychiatric Illness Mother     No Known Problems Father     No Known Problems Sister     No Known Problems Maternal Grandmother     No Known Problems Maternal Grandfather     No Known Problems Paternal Grandmother     No Known Problems Paternal Grandfather     Colon cancer Maternal Uncle     Colon cancer Maternal Uncle     Colon cancer Paternal Uncle     Colon cancer Family     Alcohol abuse Sister     Asthma Sister          OBJECTIVE:    Vitals:   Blood Pressure: 137/85 (12/18/23 1507)  Pulse: 99 (12/18/23 1507)  Temperature: 98.1 °F (36.7 °C) (12/18/23 1507)  Temp Source: Temporal (12/18/23 1507)  Respirations: 16 (12/18/23 1507)  Height: 5' 6\" (167.6 cm) (12/01/23 2105)  Weight - Scale: (!) 136 kg (300 lb 11.2 oz) (12/17/23 0739)  SpO2: 94 % (12/18/23 1507)    Physical Exam:     General Appearance: Alert, cooperative, no distress.  HEENT: Head normocephalic, atraumatic, without obvious abnormality.  Heart: Normal rate and rhythm.  Lungs: Non-labored breathing. No respiratory distress.  Abdomen: Without distension.  Psychiatric: AAOx3  Lower Extremity:  Vascular:   DP/PT pedal pulses on the left are weak. DP/PT pedal pulses on the right are non palpable. CRT brisk at the digits. Pedal hair is absent. 1+ edema noted at bilateral lower extremities. Skin temperature is within normal limits bilaterally.    Musculoskeletal:  MMT is 4/5 in all muscle compartments bilaterally. Passive ROM at the 1st MPJ and ankle joint are Normal bilaterally with the leg extended. Pain on palpation of the right second toe PIPJ, proximal phalanx, and dorsal second MTPJ.  There is pain during range of motion of the right second MPJ and PIPJ.  There is a severe flexible hammertoe deformity noted to the right second toe. No gross deformities noted.     Dermatological:  Ecchymosis to the right 2nd toe is resolved.  No open ulcerations noted bilaterally.  Right hallux lateral nail border status " "post partial nail avulsion for ingrown toenail, currently with a scab and no active drainage.  No pain on palpation of the right hallux. Toenails x10 are thickened, elongated, discolored, with subungual debris present and pain on palpation of the nails.    Neurological:  Gross sensation is intact. Protective sensation is intact. Patient Denies numbness and/or paresthesias.        Additional Data:     Lab Results: I have personally reviewed pertinent labs including:              Invalid input(s): \"LABALBU\"  Results from last 7 days   Lab Units 12/18/23  0630   INR  1.36*       Cultures: I have personally reviewed pertinent cultures including:              Imaging: I have personally reviewed pertinent films in PACS.  EKG, Pathology, and Other Studies: I have personally reviewed pertinent reports.      "

## 2023-12-18 NOTE — CASE MANAGEMENT
CM spoke to the patient, she advised she was doing ok, but appeared upset and was superficial. Patients ACT team came to see her as we were speaking.     *update: patient came to this writers door to apologize for being short with this writer, states she is just sad that she will not be home for the holidays and misses her cat.

## 2023-12-18 NOTE — TREATMENT TEAM
Pt attended all groups.  Scant and constricted affect in am.  Pt squeezing stress ball and poor eye contact.      12/18/23 1000   Activity/Group Checklist   Group Community meeting   Attendance Attended   Attendance Duration (min) 46-60   Interactions Other (Comment)  (scant)   Affect/Mood Constricted   Goals Achieved Able to listen to others;Discussed coping strategies;Identified relapse prevention strategies        denies

## 2023-12-18 NOTE — PROGRESS NOTES
12/18/23 0754   Team Meeting   Meeting Type Daily Rounds   Initial Conference Date 12/18/23   Next Conference Date 12/19/23   Team Members Present   Team Members Present Physician;Nurse;;;Occupational Therapist   Physician Team Member Dr Smith, SHEFALI Lovell   Nursing Team Member Aram   Care Management Team Member Atiya   Social Work Team Member Dalia TALBOT Team Member Yeison   Patient/Family Present   Patient Present No   Patient's Family Present No     SNOWDEN given on Sunday, PRN atarax, PRN ativan, upset about [facility], superficial, SLIM had issues with coumadin, discontinue PO meds by time she has second SNOWDEN this week.

## 2023-12-18 NOTE — PLAN OF CARE
Problem: Risk for Self Injury/Neglect  Goal: Treatment Goal: Remain safe during length of stay, learn and adopt new coping skills, and be free of self-injurious ideation, impulses and acts at the time of discharge  Outcome: Progressing  Goal: Verbalize thoughts and feelings  Description: Interventions:  - Assess and re-assess patient's lethality and potential for self-injury  - Engage patient in 1:1 interactions, daily, for a minimum of 15 minutes  - Encourage patient to express feelings, fears, frustrations, hopes  - Establish rapport/trust with patient   Outcome: Progressing  Goal: Refrain from harming self  Description: Interventions:  - Monitor patient closely, per order  - Develop a trusting relationship  - Supervise medication ingestion, monitor effects and side effects   Outcome: Progressing  Goal: Attend and participate in unit activities, including therapeutic, recreational, and educational groups  Description: Interventions:  - Provide therapeutic and educational activities daily, encourage attendance and participation, and document same in the medical record  - Obtain collateral information, encourage visitation and family involvement in care   Outcome: Progressing  Goal: Recognize maladaptive responses and adopt new coping mechanisms  Outcome: Progressing  Goal: Complete daily ADLs, including personal hygiene independently, as able  Description: Interventions:  - Observe, teach, and assist patient with ADLS  - Monitor and promote a balance of rest/activity, with adequate nutrition and elimination  Outcome: Progressing     Problem: Depression  Goal: Treatment Goal: Demonstrate behavioral control of depressive symptoms, verbalize feelings of improved mood/affect, and adopt new coping skills prior to discharge  Outcome: Progressing  Goal: Verbalize thoughts and feelings  Description: Interventions:  - Assess and re-assess patient's level of risk   - Engage patient in 1:1 interactions, daily, for a minimum  of 15 minutes   - Encourage patient to express feelings, fears, frustrations, hopes   Outcome: Progressing  Goal: Refrain from harming self  Description: Interventions:  - Monitor patient closely, per order   - Supervise medication ingestion, monitor effects and side effects   Outcome: Progressing  Goal: Refrain from isolation  Description: Interventions:  - Develop a trusting relationship   - Encourage socialization   Outcome: Progressing  Goal: Refrain from self-neglect  Outcome: Progressing  Goal: Attend and participate in unit activities, including therapeutic, recreational, and educational groups  Description: Interventions:  - Provide therapeutic and educational activities daily, encourage attendance and participation, and document same in the medical record   Outcome: Progressing  Goal: Complete daily ADLs, including personal hygiene independently, as able  Description: Interventions:  - Observe, teach, and assist patient with ADLS  -  Monitor and promote a balance of rest/activity, with adequate nutrition and elimination   Outcome: Progressing     Problem: Anxiety  Goal: Anxiety is at manageable level  Description: Interventions:  - Assess and monitor patient's anxiety level.   - Monitor for signs and symptoms (heart palpitations, chest pain, shortness of breath, headaches, nausea, feeling jumpy, restlessness, irritable, apprehensive).   - Collaborate with interdisciplinary team and initiate plan and interventions as ordered.  - [place] patient to unit/surroundings  - Explain treatment plan  - Encourage participation in care  - Encourage verbalization of concerns/fears  - Identify coping mechanisms  - Assist in developing anxiety-reducing skills  - Administer/offer alternative therapies  - Limit or eliminate stimulants  Outcome: Progressing     Problem: Risk for Violence/Aggression Toward Others  Goal: Treatment Goal: Refrain from acts of violence/aggression during length of stay, and demonstrate improved  impulse control at the time of discharge  Outcome: Progressing  Goal: Verbalize thoughts and feelings  Description: Interventions:  - Assess and re-assess patient's level of risk, every waking shift  - Engage patient in 1:1 interactions, daily, for a minimum of 15 minutes   - Allow patient to express feelings and frustrations in a safe and non-threatening manner   - Establish rapport/trust with patient   Outcome: Progressing  Goal: Refrain from harming others  Outcome: Progressing  Goal: Refrain from destructive acts on the environment or property  Outcome: Progressing  Goal: Control angry outbursts  Description: Interventions:  - Monitor patient closely, per order  - Ensure early verbal de-escalation  - Monitor prn medication needs  - Set reasonable/therapeutic limits, outline behavioral expectations, and consequences   - Provide a non-threatening milieu, utilizing the least restrictive interventions   Outcome: Progressing  Goal: Attend and participate in unit activities, including therapeutic, recreational, and educational groups  Description: Interventions:  - Provide therapeutic and educational activities daily, encourage attendance and participation, and document same in the medical record   Outcome: Progressing  Goal: Identify appropriate positive anger management techniques  Description: Interventions:  - Offer anger management and coping skills groups   - Staff will provide positive feedback for appropriate anger control  Outcome: Progressing     Problem: DISCHARGE PLANNING - CARE MANAGEMENT  Goal: Discharge to post-acute care or home with appropriate resources  Description: INTERVENTIONS:  - Conduct assessment to determine patient/family and health care team treatment goals, and need for post-acute services based on payer coverage, community resources, and patient preferences, and barriers to discharge  - Address psychosocial, clinical, and financial barriers to discharge as identified in assessment in  conjunction with the patient/family and health care team  - Arrange appropriate level of post-acute services according to patient’s   needs and preference and payer coverage in collaboration with the physician and health care team  - Communicate with and update the patient/family, physician, and health care team regarding progress on the discharge plan  - Arrange appropriate transportation to post-acute venues  Outcome: Progressing     Problem: SAFETY, RESTRAINT - VIOLENT/SELF-DESTRUCTIVE  Goal: Remains free of harm/injury from restraints (Restraint for Violent/Self-Destructive Behavior)  Description: INTERVENTIONS:  - Instruct patient/family regarding restraint use   - Assess and monitor physiologic and psychological status   - Provide interventions and comfort measures to meet assessed patient needs   - Ensure continuous in person monitoring is provided   - Identify and implement measures to help patient regain control  - Assess readiness for release of restraint  Outcome: Progressing  Goal: Returns to optimal restraint-free functioning  Description: INTERVENTIONS:  - Assess the patient's behavior and symptoms that indicate continued need for restraint  - Identify and implement measures to help patient regain control  - Assess readiness for release of restraint   Outcome: Progressing

## 2023-12-19 LAB
INR PPP: 1.56 (ref 0.84–1.19)
PROTHROMBIN TIME: 19 SECONDS (ref 11.6–14.5)

## 2023-12-19 PROCEDURE — 99232 SBSQ HOSP IP/OBS MODERATE 35: CPT | Performed by: STUDENT IN AN ORGANIZED HEALTH CARE EDUCATION/TRAINING PROGRAM

## 2023-12-19 PROCEDURE — 85610 PROTHROMBIN TIME: CPT | Performed by: NURSE PRACTITIONER

## 2023-12-19 RX ADMIN — CYANOCOBALAMIN TAB 1000 MCG 1000 MCG: 1000 TAB at 09:03

## 2023-12-19 RX ADMIN — LOSARTAN POTASSIUM 100 MG: 50 TABLET, FILM COATED ORAL at 08:21

## 2023-12-19 RX ADMIN — OXCARBAZEPINE 450 MG: 150 TABLET, FILM COATED ORAL at 21:35

## 2023-12-19 RX ADMIN — Medication 250 MG: at 17:00

## 2023-12-19 RX ADMIN — OXYBUTYNIN CHLORIDE 5 MG: 5 TABLET ORAL at 09:03

## 2023-12-19 RX ADMIN — PALIPERIDONE 12 MG: 6 TABLET, EXTENDED RELEASE ORAL at 09:03

## 2023-12-19 RX ADMIN — WARFARIN SODIUM 7 MG: 5 TABLET ORAL at 17:00

## 2023-12-19 RX ADMIN — CLONIDINE HYDROCHLORIDE 0.1 MG: 0.1 TABLET ORAL at 21:35

## 2023-12-19 RX ADMIN — OXYBUTYNIN CHLORIDE 5 MG: 5 TABLET ORAL at 17:00

## 2023-12-19 RX ADMIN — PANTOPRAZOLE SODIUM 40 MG: 40 TABLET, DELAYED RELEASE ORAL at 06:27

## 2023-12-19 RX ADMIN — TOPIRAMATE 200 MG: 100 TABLET, FILM COATED ORAL at 09:03

## 2023-12-19 RX ADMIN — ATOMOXETINE 40 MG: 40 CAPSULE ORAL at 09:03

## 2023-12-19 RX ADMIN — NALTREXONE HYDROCHLORIDE 50 MG: 50 TABLET, FILM COATED ORAL at 09:03

## 2023-12-19 RX ADMIN — BUPROPION HYDROCHLORIDE 450 MG: 300 TABLET, EXTENDED RELEASE ORAL at 09:02

## 2023-12-19 RX ADMIN — LEVOTHYROXINE SODIUM 125 MCG: 125 TABLET ORAL at 06:27

## 2023-12-19 RX ADMIN — OXCARBAZEPINE 450 MG: 150 TABLET, FILM COATED ORAL at 09:03

## 2023-12-19 RX ADMIN — Medication 250 MG: at 09:03

## 2023-12-19 RX ADMIN — CLONIDINE HYDROCHLORIDE 0.1 MG: 0.1 TABLET ORAL at 08:21

## 2023-12-19 RX ADMIN — FUROSEMIDE 20 MG: 20 TABLET ORAL at 08:21

## 2023-12-19 RX ADMIN — OLANZAPINE 10 MG: 10 TABLET, FILM COATED ORAL at 11:36

## 2023-12-19 RX ADMIN — MELATONIN TAB 3 MG 3 MG: 3 TAB at 21:35

## 2023-12-19 RX ADMIN — TOPIRAMATE 200 MG: 100 TABLET, FILM COATED ORAL at 17:00

## 2023-12-19 NOTE — NURSING NOTE
Pt is bright on approach. She is social and visible in the milieu. She reports feeling better than she was this morning. Med/meal compliant and cooperative with care. Anxious at times. Pt currently denies any unmet needs.

## 2023-12-19 NOTE — NURSING NOTE
Pt depressed and tearful about not being at home. Present in milieu interacting with peers appropriately. Appetite good. Medication compliant. Denies SI.

## 2023-12-19 NOTE — PROGRESS NOTES
12/19/23 1330   Activity/Group Checklist   Group Pet therapy   Attendance Attended   Attendance Duration (min) 16-30   Interactions Interacted appropriately   Affect/Mood Bright;Normal range   Goals Achieved Able to engage in interactions

## 2023-12-19 NOTE — PROGRESS NOTES
12/19/23 0754   Team Meeting   Meeting Type Daily Rounds   Initial Conference Date 12/19/23   Next Conference Date 12/20/23   Team Members Present   Team Members Present Physician;Nurse;;;Occupational Therapist   Physician Team Member Dr Smith, Dr Collazo, SHEFALI Lovell   Nursing Team Member Jennifer   Care Management Team Member Atiya   Social Work Team Member Dalia   OT Team Member Yeison   Patient/Family Present   Patient Present No   Patient's Family Present No     Discharge pending, EAC possible, referral will be sent on 12/28/23.

## 2023-12-19 NOTE — NURSING NOTE
Pt very tearful after [facility], would not respond to this writer but accepted Zyprexa 10 mg po prn with positive effect.  Pt then able to process feelings with .  Good appetite and steady gait.  VSS.  Denied SI.  Monitored for safety and support.

## 2023-12-19 NOTE — PROGRESS NOTES
12/19/23 1000   Activity/Group Checklist   Group Other (Comment)  (Group Art Therapy/Psychodynamic, Open Choice with Process Discussion)   Attendance Attended   Attendance Duration (min) Greater than 60  (90 min group)   Interactions Did not interact   Affect/Mood Other (Comment)  (Patient had her head down and was weeping at times. Patient would not respond to any interventions this therapist offered.)

## 2023-12-19 NOTE — PROGRESS NOTES
Progress Note - Behavioral Health   Blanca Mason 53 y.o. female MRN: [id number]  Unit/Bed#: [facility] 651-02 Encounter: [id number]    Patient was seen today for continuation of care, records reviewed and patient was discussed with the morning case review team.    Blanca was seen today for psychiatric follow-up.  On assessment today, Blanca was preoccupied with discharge.  Asking this writer if she felt that possible transition to ECT was necessary, patient educated about current plan of care as well as previous admissions due to suicidal behavior.  In agreement that she needs to continue to work on her anger management as well as coping skills, patient does state that she feels that her father is more supportive and willing to assist her.  Encouraged to continue to attend groups as well as work on anger management exercises, no medication changes to be made at this time.  Cross titration of Latuda to Invega successful.  Patient continues to be on 12 mg daily last dose to be received on 12/21/2023.  Invega Sustenna 234 milligrams given on 12/17/2023 with subsequent dose of 156 on 12/21.  We will continue with [place] Invega Sustenna q. 28 days.  Trileptal 450 mg twice daily given for mood stability, level obtained on 12/12/23 at 14.  Patient will continue to need inpatient hospitalization due to needing higher level of care due to increased suicidal behavior.     Blanca denies acute current suicidal/self-harm ideation/intent/plan upon direct inquiry at this time.  Blanca remains behaviorally appropriate, no agitation or aggression noted on exam or in report.  Blanca also denies HI/AH/VH, and does not appear overtly manic.  No overt delusions or paranoia are verbalized. Impulse control is intact.  Blanca remains adherent to her current psychotropic medication regimen and denies any side effects from medications, as well as none noted on exam.    Vitals:  Vitals:    12/19/23 0740   BP: 148/89   Pulse: 82   Resp: 16   Temp: (!) 97 °F  (36.1 °C)   SpO2: 95%       Laboratory Results:  I have personally reviewed all pertinent laboratory/tests results.  Most Recent Labs:   Lab Results   Component Value Date    WBC 8.96 12/02/2023    RBC 4.34 12/02/2023    HGB 12.9 12/02/2023    HCT 40.5 12/02/2023     12/02/2023    RDW 14.2 12/02/2023    TOTANEUTABS 4.73 11/19/2023    NEUTROABS 5.77 12/02/2023    SODIUM 139 12/02/2023    K 3.8 12/02/2023     12/02/2023    CO2 21 12/02/2023    BUN 13 12/02/2023    CREATININE 0.62 12/02/2023    GLUC 107 12/02/2023    GLUF 107 (H) 12/02/2023    CALCIUM 8.7 12/02/2023    AST 13 12/02/2023    ALT 13 12/02/2023    ALKPHOS 55 12/02/2023    TP 6.0 (L) 12/02/2023    ALB 3.5 12/02/2023    TBILI 0.25 12/02/2023    CHOLESTEROL 234 (H) 03/09/2023    HDL 71 03/09/2023    TRIG 153 (H) 03/09/2023    LDLCALC 132 (H) 03/09/2023    NONHDLC 163 03/09/2023    VALPROICTOT 23 (L) 11/10/2023    AMMONIA 63 11/06/2023    WOI1EOOBMZWR 4.324 12/02/2023    FREET4 0.81 11/06/2023    T3FREE 2.27 (L) 10/16/2019    PREGUR negative 07/13/2022    PREGSERUM Negative 11/06/2023    RPR Non-Reactive 07/17/2022    HGBA1C 5.0 01/05/2023    EAG 97 01/05/2023       Psychiatric Review of Systems:  Behavior over the last 24 hours:  unchanged.   Sleep: good  Appetite: good  Medication side effects: none  ROS: no complaints, denies shortness of breath or chest pain and all other systems are negative for acute changes    Mental Status Evaluation:  Appearance:  dressed appropriately, adequate grooming   Behavior:  cooperative, calm, guarded   Speech:  normal rate and volume   Mood:  depressed, labile   Affect:  constricted   Thought Process:  linear   Thought Content:  no overt delusions   Perceptual Disturbances: denies auditory hallucinations when asked, does not appear responding to internal stimuli   Risk Potential: Suicidal ideation - None  Homicidal ideation - None  Potential for aggression - No   Memory:  recent and remote memory grossly intact    Sensorium  person, place, and time/date      Consciousness:  alert and awake   Attention: attention span and concentration are age appropriate   Insight:  limited   Judgment: limited   Gait/Station: normal gait/station   Motor Activity: no abnormal movements   Progress Toward Goals:   Blanca is progressing towards goals of inpatient psychiatric treatment by continued medication compliance and is attending therapeutic modalities on the milieu. However, the patient continues to require inpatient psychiatric hospitalization for continued medication management and titration to optimize symptom reduction, improve sleep hygiene, and demonstrate adequate self-care.    Assessment/Plan   Principal Problem:    Schizoaffective disorder, bipolar type ([facility])  Active Problems:    Benign essential hypertension    Edema    Hypothyroidism    MAG (obstructive sleep apnea)    Overactive bladder    Sjogren's syndrome ([facility])    Medical clearance for psychiatric admission    Anxiety    Borderline personality disorder ([facility])    History of pulmonary embolism    Ingrown toenail      Recommended Treatment: Treatment plan and medication changes discussed and per the attending physician the plan is:    1.Continue with group therapy, milieu therapy and occupational therapy  2.Behavioral Health checks every 7 minutes  3.Continue frequent safety checks and vitals per unit protocol  4.Continue with [facility] medical management as indicated  5.Continue with current medication regimen  6.Will review labs in the a.m.  7.Disposition Planning: Discharge planning and efforts remain ongoing    Behavioral Health Medications: all current active meds have been reviewed and continue current psychiatric medications.  Current Facility-Administered Medications   Medication Dose Route Frequency Provider Last Rate    aluminum-magnesium hydroxide-simethicone  30 mL Oral Q4H PRN JHONATAN Fields      atoMOXetine  40 mg Oral Daily Anatoly Prajapati DO       buPROPion  450 mg Oral Daily Anatoly Prajapati, DO      cloNIDine  0.1 mg Oral Q12H [facility] Anatoly Prajapati, DO      cyanocobalamin  1,000 mcg Oral Daily Dianna Mcrae, CRNP      cyanocobalamin  1,000 mcg Intramuscular Q30 Days Dianna Mcrae, CRNP      ergocalciferol  50,000 Units Oral Weekly Dianna Mcrae, CRNP      furosemide  20 mg Oral Daily Wendy Anderson, CRNP      hydrOXYzine HCL  25 mg Oral Q6H PRN Max 4/day Virginia Mccullough, CRNP      hydrOXYzine HCL  50 mg Oral Q6H PRN Max 4/day Virginia Mccullough, CRNP      ibuprofen  200 mg Oral Q6H PRN Virginia Mccullough, CRNP      ibuprofen  400 mg Oral Q6H PRN Virginia Mccullough, CRNP      ibuprofen  600 mg Oral Q8H PRN Virginia Mccullough, CRNP      levothyroxine  125 mcg Oral Early Morning Wendy Anderson, CRNP      loperamide  2 mg Oral Q4H PRN Anatoly Prajapati, DO      LORazepam  1 mg Intramuscular Q6H PRN Max 3/day Virginia Mccullough, CRNP      LORazepam  1 mg Oral Q6H PRN Max 3/day Virginia Mccullough, CRNP      losartan  100 mg Oral Daily Wendy Anderson, CRNP      melatonin  3 mg Oral HS Frederick Smith MD      naltrexone  50 mg Oral Daily Anatoly Prajapati, DO      OLANZapine  10 mg Oral Q6H PRN Frederick Smith MD      Or    OLANZapine  10 mg Intramuscular Q6H PRN Frederick Smith MD      OLANZapine  5 mg Oral Q6H PRN Frederick Smith MD      Or    OLANZapine  5 mg Intramuscular Q6H PRN Frederick Smith MD      OLANZapine  2.5 mg Oral Q3H PRN Frederick Smith MD      OXcarbazepine  450 mg Oral Q12H [facility] Frederick Smith MD      oxybutynin  5 mg Oral BID Wendy Anderson, CRNP      paliperidone  12 mg Oral Daily Frederick Smith MD      [START ON 12/21/2023] paliperidone  156 mg IM- Deltoid Once Frederick Smith MD      [START ON 1/15/2024] paliperidone  234 mg IM- Deltoid Q4 weeks Frederick Smith MD      pantoprazole  40 mg Oral Early Morning JHONATAN Fields      polyethylene glycol  17 g Oral Daily PRN JHONATAN Anne       saccharomyces boulardii  250 mg Oral BID Anatoly Prajapati, DO      senna-docusate sodium  1 tablet Oral Daily PRN JHONATAN Anne      topiramate  200 mg Oral BID Anatoly Prajapati, DO      traZODone  50 mg Oral HS PRN JHONATAN Anne      warfarin  7 mg Oral Daily (warfarin) JHONATAN Berg         Risks / Benefits of Treatment:  Risks, benefits, and possible side effects of medications explained to patient and patient verbalizes understanding and agreement for treatment.    Counseling / Coordination of Care:  Patient's progress reviewed with nursing staff.  Medications, treatment progress and treatment plan reviewed with patient.  Supportive counseling provided to the patient.    Total floor/unit time spent today 25 minutes. Greater than 50% of total time was spent with the patient and / or family counseling and / or coordination of care. A description of the counseling / coordination of care: medication education, treatment plan, supportive therapy.    This note was completed in part utilizing Dragon dictation Software. Grammatical, translation, syntax errors, random word insertions, spelling mistakes, and incomplete sentences may be an occasional consequence of this system secondary to software limitations with voice recognition, ambient noise, and hardware issues. If you have any questions or concerns about the content, text, or information contained within the body of this dictation, please contact the provider for clarification

## 2023-12-19 NOTE — PLAN OF CARE
Problem: Risk for Self Injury/Neglect  Goal: Treatment Goal: Remain safe during length of stay, learn and adopt new coping skills, and be free of self-injurious ideation, impulses and acts at the time of discharge  Outcome: Progressing  Goal: Verbalize thoughts and feelings  Description: Interventions:  - Assess and re-assess patient's lethality and potential for self-injury  - Engage patient in 1:1 interactions, daily, for a minimum of 15 minutes  - Encourage patient to express feelings, fears, frustrations, hopes  - Establish rapport/trust with patient   Outcome: Progressing  Goal: Refrain from harming self  Description: Interventions:  - Monitor patient closely, per order  - Develop a trusting relationship  - Supervise medication ingestion, monitor effects and side effects   Outcome: Progressing  Goal: Attend and participate in unit activities, including therapeutic, recreational, and educational groups  Description: Interventions:  - Provide therapeutic and educational activities daily, encourage attendance and participation, and document same in the medical record  - Obtain collateral information, encourage visitation and family involvement in care   Outcome: Progressing  Goal: Recognize maladaptive responses and adopt new coping mechanisms  Outcome: Progressing  Goal: Complete daily ADLs, including personal hygiene independently, as able  Description: Interventions:  - Observe, teach, and assist patient with ADLS  - Monitor and promote a balance of rest/activity, with adequate nutrition and elimination  Outcome: Progressing     Problem: Depression  Goal: Treatment Goal: Demonstrate behavioral control of depressive symptoms, verbalize feelings of improved mood/affect, and adopt new coping skills prior to discharge  Outcome: Progressing  Goal: Verbalize thoughts and feelings  Description: Interventions:  - Assess and re-assess patient's level of risk   - Engage patient in 1:1 interactions, daily, for a minimum  of 15 minutes   - Encourage patient to express feelings, fears, frustrations, hopes   Outcome: Progressing  Goal: Refrain from harming self  Description: Interventions:  - Monitor patient closely, per order   - Supervise medication ingestion, monitor effects and side effects   Outcome: Progressing  Goal: Refrain from isolation  Description: Interventions:  - Develop a trusting relationship   - Encourage socialization   Outcome: Progressing  Goal: Refrain from self-neglect  Outcome: Progressing  Goal: Attend and participate in unit activities, including therapeutic, recreational, and educational groups  Description: Interventions:  - Provide therapeutic and educational activities daily, encourage attendance and participation, and document same in the medical record   Outcome: Progressing  Goal: Complete daily ADLs, including personal hygiene independently, as able  Description: Interventions:  - Observe, teach, and assist patient with ADLS  -  Monitor and promote a balance of rest/activity, with adequate nutrition and elimination   Outcome: Progressing     Problem: Anxiety  Goal: Anxiety is at manageable level  Description: Interventions:  - Assess and monitor patient's anxiety level.   - Monitor for signs and symptoms (heart palpitations, chest pain, shortness of breath, headaches, nausea, feeling jumpy, restlessness, irritable, apprehensive).   - Collaborate with interdisciplinary team and initiate plan and interventions as ordered.  - [place] patient to unit/surroundings  - Explain treatment plan  - Encourage participation in care  - Encourage verbalization of concerns/fears  - Identify coping mechanisms  - Assist in developing anxiety-reducing skills  - Administer/offer alternative therapies  - Limit or eliminate stimulants  Outcome: Progressing     Problem: Risk for Violence/Aggression Toward Others  Goal: Treatment Goal: Refrain from acts of violence/aggression during length of stay, and demonstrate improved  impulse control at the time of discharge  Outcome: Progressing  Goal: Verbalize thoughts and feelings  Description: Interventions:  - Assess and re-assess patient's level of risk, every waking shift  - Engage patient in 1:1 interactions, daily, for a minimum of 15 minutes   - Allow patient to express feelings and frustrations in a safe and non-threatening manner   - Establish rapport/trust with patient   Outcome: Progressing  Goal: Refrain from harming others  Outcome: Progressing  Goal: Refrain from destructive acts on the environment or property  Outcome: Progressing  Goal: Control angry outbursts  Description: Interventions:  - Monitor patient closely, per order  - Ensure early verbal de-escalation  - Monitor prn medication needs  - Set reasonable/therapeutic limits, outline behavioral expectations, and consequences   - Provide a non-threatening milieu, utilizing the least restrictive interventions   Outcome: Progressing  Goal: Attend and participate in unit activities, including therapeutic, recreational, and educational groups  Description: Interventions:  - Provide therapeutic and educational activities daily, encourage attendance and participation, and document same in the medical record   Outcome: Progressing  Goal: Identify appropriate positive anger management techniques  Description: Interventions:  - Offer anger management and coping skills groups   - Staff will provide positive feedback for appropriate anger control  Outcome: Progressing     Problem: DISCHARGE PLANNING - CARE MANAGEMENT  Goal: Discharge to post-acute care or home with appropriate resources  Description: INTERVENTIONS:  - Conduct assessment to determine patient/family and health care team treatment goals, and need for post-acute services based on payer coverage, community resources, and patient preferences, and barriers to discharge  - Address psychosocial, clinical, and financial barriers to discharge as identified in assessment in  conjunction with the patient/family and health care team  - Arrange appropriate level of post-acute services according to patient’s   needs and preference and payer coverage in collaboration with the physician and health care team  - Communicate with and update the patient/family, physician, and health care team regarding progress on the discharge plan  - Arrange appropriate transportation to post-acute venues  Outcome: Progressing     Problem: SAFETY, RESTRAINT - VIOLENT/SELF-DESTRUCTIVE  Goal: Remains free of harm/injury from restraints (Restraint for Violent/Self-Destructive Behavior)  Description: INTERVENTIONS:  - Instruct patient/family regarding restraint use   - Assess and monitor physiologic and psychological status   - Provide interventions and comfort measures to meet assessed patient needs   - Ensure continuous in person monitoring is provided   - Identify and implement measures to help patient regain control  - Assess readiness for release of restraint  Outcome: Progressing  Goal: Returns to optimal restraint-free functioning  Description: INTERVENTIONS:  - Assess the patient's behavior and symptoms that indicate continued need for restraint  - Identify and implement measures to help patient regain control  - Assess readiness for release of restraint   Outcome: Progressing

## 2023-12-19 NOTE — CASE MANAGEMENT
Patient was crying and upset in the day room, she requested to speak to this writer. She mentioned that she hates being lied to and told different things by the providers. She advised that the nurse practitioner advised that the goal to get her discharged was by 2/12/24 so that she can get to her MRI. She then said the psychiatrist told her that he was unsure when she would discharge and it could take longer. So this made her upset and wants everyone to be on the same page. She also stated that she was woken up a couple times and could not get back to sleep last night due to the noise on the unit, and then once she got back to sleep she was woken up at 4 am to have blood drawn.     Advised the patient that we cannot control the time frame of how long an EAC referral takes. She also wants to just go home and is asking what she can do to prove that she is better and be able to go home instead of EAC.

## 2023-12-20 LAB
INR PPP: 1.99 (ref 0.84–1.19)
PROTHROMBIN TIME: 22.9 SECONDS (ref 11.6–14.5)

## 2023-12-20 PROCEDURE — 99232 SBSQ HOSP IP/OBS MODERATE 35: CPT | Performed by: STUDENT IN AN ORGANIZED HEALTH CARE EDUCATION/TRAINING PROGRAM

## 2023-12-20 PROCEDURE — 85610 PROTHROMBIN TIME: CPT | Performed by: NURSE PRACTITIONER

## 2023-12-20 RX ADMIN — OXYBUTYNIN CHLORIDE 5 MG: 5 TABLET ORAL at 09:22

## 2023-12-20 RX ADMIN — LOSARTAN POTASSIUM 100 MG: 50 TABLET, FILM COATED ORAL at 08:34

## 2023-12-20 RX ADMIN — OXYBUTYNIN CHLORIDE 5 MG: 5 TABLET ORAL at 17:05

## 2023-12-20 RX ADMIN — NALTREXONE HYDROCHLORIDE 50 MG: 50 TABLET, FILM COATED ORAL at 08:34

## 2023-12-20 RX ADMIN — CLONIDINE HYDROCHLORIDE 0.1 MG: 0.1 TABLET ORAL at 08:35

## 2023-12-20 RX ADMIN — BUPROPION HYDROCHLORIDE 450 MG: 300 TABLET, EXTENDED RELEASE ORAL at 08:34

## 2023-12-20 RX ADMIN — OXCARBAZEPINE 450 MG: 150 TABLET, FILM COATED ORAL at 21:56

## 2023-12-20 RX ADMIN — CLONIDINE HYDROCHLORIDE 0.1 MG: 0.1 TABLET ORAL at 21:57

## 2023-12-20 RX ADMIN — TRAZODONE HYDROCHLORIDE 50 MG: 50 TABLET ORAL at 21:56

## 2023-12-20 RX ADMIN — Medication 250 MG: at 09:22

## 2023-12-20 RX ADMIN — CYANOCOBALAMIN TAB 1000 MCG 1000 MCG: 1000 TAB at 09:22

## 2023-12-20 RX ADMIN — ATOMOXETINE 40 MG: 40 CAPSULE ORAL at 08:35

## 2023-12-20 RX ADMIN — FUROSEMIDE 20 MG: 20 TABLET ORAL at 08:34

## 2023-12-20 RX ADMIN — PANTOPRAZOLE SODIUM 40 MG: 40 TABLET, DELAYED RELEASE ORAL at 06:11

## 2023-12-20 RX ADMIN — MELATONIN TAB 3 MG 3 MG: 3 TAB at 21:57

## 2023-12-20 RX ADMIN — TOPIRAMATE 200 MG: 100 TABLET, FILM COATED ORAL at 08:35

## 2023-12-20 RX ADMIN — OXCARBAZEPINE 450 MG: 150 TABLET, FILM COATED ORAL at 08:34

## 2023-12-20 RX ADMIN — Medication 250 MG: at 17:04

## 2023-12-20 RX ADMIN — PALIPERIDONE 12 MG: 6 TABLET, EXTENDED RELEASE ORAL at 08:34

## 2023-12-20 RX ADMIN — WARFARIN SODIUM 7 MG: 5 TABLET ORAL at 17:04

## 2023-12-20 RX ADMIN — TOPIRAMATE 200 MG: 100 TABLET, FILM COATED ORAL at 17:05

## 2023-12-20 RX ADMIN — LEVOTHYROXINE SODIUM 125 MCG: 125 TABLET ORAL at 06:11

## 2023-12-20 NOTE — PROGRESS NOTES
Progress Note - Behavioral Health   Blanca Mason 53 y.o. female MRN: [id number]  Unit/Bed#: [facility] 651-02 Encounter: [id number]    Patient was seen today for continuation of care, records reviewed and patient was discussed with the morning case review team.    Blanca was seen today for psychiatric follow-up.  On assessment today, Blanca was pleasant but tearful during her interview.  Stating that she feels she has a lot of resources outpatient, patient continues to be preoccupied with discharge and that she feels ready to leave.  Plan of care again reviewed, patient educated about the use of coping mechanisms as well as anger management to help assist with labile moods.  She is also reminded of previous admissions due to suicidality despite established resources including ACT services.  Tolerating all medication changes well, SNOWDEN Invega Sustenna 234 mg given on 12/17/2023 with subsequent dose of 156 mg on 12/21/2023.  No medication changes to be made at this time, patient will benefit from EAC and elevated risk of suicidality.   continues to work on referral.    Blanca denies acute suicidal/self-harm ideation/intent/plan upon direct inquiry at this time.  Blanca remains behaviorally appropriate, no agitation or aggression noted on exam or in report.  Blanca also denies HI/AH/VH, and does not appear overtly manic.  No overt delusions or paranoia are verbalized. Impulse control is intact.  Blanca remains adherent to her current psychotropic medication regimen and denies any side effects from medications, as well as none noted on exam.    Vitals:  Vitals:    12/20/23 0724   BP: 146/72   Pulse: 74   Resp: 16   Temp: 97.6 °F (36.4 °C)   SpO2: 94%       Laboratory Results:  I have personally reviewed all pertinent laboratory/tests results.  Most Recent Labs:   Lab Results   Component Value Date    WBC 8.96 12/02/2023    RBC 4.34 12/02/2023    HGB 12.9 12/02/2023    HCT 40.5 12/02/2023     12/02/2023    RDW 14.2  12/02/2023    TOTANEUTABS 4.73 11/19/2023    NEUTROABS 5.77 12/02/2023    SODIUM 139 12/02/2023    K 3.8 12/02/2023     12/02/2023    CO2 21 12/02/2023    BUN 13 12/02/2023    CREATININE 0.62 12/02/2023    GLUC 107 12/02/2023    GLUF 107 (H) 12/02/2023    CALCIUM 8.7 12/02/2023    AST 13 12/02/2023    ALT 13 12/02/2023    ALKPHOS 55 12/02/2023    TP 6.0 (L) 12/02/2023    ALB 3.5 12/02/2023    TBILI 0.25 12/02/2023    CHOLESTEROL 234 (H) 03/09/2023    HDL 71 03/09/2023    TRIG 153 (H) 03/09/2023    LDLCALC 132 (H) 03/09/2023    NONHDLC 163 03/09/2023    VALPROICTOT 23 (L) 11/10/2023    AMMONIA 63 11/06/2023    API3RZFVURRA 4.324 12/02/2023    FREET4 0.81 11/06/2023    T3FREE 2.27 (L) 10/16/2019    PREGUR negative 07/13/2022    PREGSERUM Negative 11/06/2023    RPR Non-Reactive 07/17/2022    HGBA1C 5.0 01/05/2023    EAG 97 01/05/2023       Psychiatric Review of Systems:  Behavior over the last 24 hours:  unchanged.   Sleep: good  Appetite: good  Medication side effects:   ROS: no complaints, denies shortness of breath or chest pain and all other systems are negative for acute changes    Mental Status Evaluation:  Appearance:  dressed appropriately, adequate grooming   Behavior:  guarded   Speech:  normal rate and volume   Mood:  labile   Affect:  constricted   Thought Process:  linear   Thought Content:  no overt delusions   Perceptual Disturbances: denies auditory hallucinations when asked, does not appear responding to internal stimuli   Risk Potential: Suicidal ideation - None  Homicidal ideation - None  Potential for aggression - No   Memory:  recent and remote memory grossly intact   Sensorium  person, place, and time/date      Consciousness:  alert and awake   Attention: attention span and concentration are age appropriate   Insight:  limited   Judgment: limited   Gait/Station: normal gait/station   Motor Activity: no abnormal movements   Progress Toward Goals:   Blanca is progressing towards goals of  inpatient psychiatric treatment by continued medication compliance and is attending therapeutic modalities on the milieu. However, the patient continues to require inpatient psychiatric hospitalization for continued medication management and titration to optimize symptom reduction, improve sleep hygiene, and demonstrate adequate self-care.    Assessment/Plan   Principal Problem:    Schizoaffective disorder, bipolar type (HCC)  Active Problems:    Benign essential hypertension    Edema    Hypothyroidism    MAG (obstructive sleep apnea)    Overactive bladder    Sjogren's syndrome (HCC)    Medical clearance for psychiatric admission    Anxiety    Borderline personality disorder (HCC)    History of pulmonary embolism    Ingrown toenail      Recommended Treatment: Treatment plan and medication changes discussed and per the attending physician the plan is:    1.Continue with group therapy, milieu therapy and occupational therapy  2.Behavioral Health checks every 7 minutes  3.Continue frequent safety checks and vitals per unit protocol  4.Continue with [facility] medical management as indicated  5.Continue with current medication regimen  6.Will review labs in the a.m.  7.Disposition Planning: Discharge planning and efforts remain ongoing    Behavioral Health Medications: all current active meds have been reviewed and continue current psychiatric medications.  Current Facility-Administered Medications   Medication Dose Route Frequency Provider Last Rate    aluminum-magnesium hydroxide-simethicone  30 mL Oral Q4H PRN JHONATAN Fields      atoMOXetine  40 mg Oral Daily Anatoly Prajapati,       buPROPion  450 mg Oral Daily Anatoly Prajapati, DO      cloNIDine  0.1 mg Oral Q12H [facility] Anatoly Prajapati, DO      cyanocobalamin  1,000 mcg Oral Daily JHONATAN Berg      cyanocobalamin  1,000 mcg Intramuscular Q30 Days JHONATAN Berg      ergocalciferol  50,000 Units Oral Weekly JHONATAN Berg       furosemide  20 mg Oral Daily Wendy Anderson, CRNP      hydrOXYzine HCL  25 mg Oral Q6H PRN Max 4/day Virginia Mccullough, CRNP      hydrOXYzine HCL  50 mg Oral Q6H PRN Max 4/day Virginia Mccullough, CRNP      ibuprofen  200 mg Oral Q6H PRN Virginia Mccullough, CRNP      ibuprofen  400 mg Oral Q6H PRN Virginia Mccullough, CRNP      ibuprofen  600 mg Oral Q8H PRN Virginia Mccullough, CRNP      levothyroxine  125 mcg Oral Early Morning Wendy Anderson, CRNP      loperamide  2 mg Oral Q4H PRN Anatoly Prajapati, DO      LORazepam  1 mg Intramuscular Q6H PRN Max 3/day Virginia Mccullough, CRNP      LORazepam  1 mg Oral Q6H PRN Max 3/day Virginia Mccullough, CRNP      losartan  100 mg Oral Daily Wendy Anderson, CRNP      melatonin  3 mg Oral HS Frederick Smith MD      naltrexone  50 mg Oral Daily Anatoly Prajapati, DO      OLANZapine  10 mg Oral Q6H PRN Frederick Smith MD      Or    OLANZapine  10 mg Intramuscular Q6H PRN Frederick Smith MD      OLANZapine  5 mg Oral Q6H PRN Frederick Smith MD      Or    OLANZapine  5 mg Intramuscular Q6H PRN Frederick Smith MD      OLANZapine  2.5 mg Oral Q3H PRN Frederick Smith MD      OXcarbazepine  450 mg Oral Q12H TASHA Frederick Smith MD      oxybutynin  5 mg Oral BID Wendy Anderson, CRNP      paliperidone  12 mg Oral Daily Frederick Smith MD      [START ON 12/21/2023] paliperidone  156 mg IM- Deltoid Once Frederick Smith MD      [START ON 1/15/2024] paliperidone  234 mg IM- Deltoid Q4 weeks Frederick Smith MD      pantoprazole  40 mg Oral Early Morning Wendy Anderson, CRNP      polyethylene glycol  17 g Oral Daily PRN Virginia Mccullough, CRNP      saccharomyces boulardii  250 mg Oral BID Anatoly Prajapati, DO      senna-docusate sodium  1 tablet Oral Daily PRN Virginia Mccullough, CRNP      topiramate  200 mg Oral BID Anatoly Prajapati, DO      traZODone  50 mg Oral HS PRN JHONATAN Anne      warfarin  7 mg Oral Daily (warfarin) JHONATAN Fields         Risks /  Benefits of Treatment:  Risks, benefits, and possible side effects of medications explained to patient and patient verbalizes understanding and agreement for treatment.    Counseling / Coordination of Care:  Patient's progress reviewed with nursing staff.  Medications, treatment progress and treatment plan reviewed with patient.  Supportive counseling provided to the patient.    Total floor/unit time spent today 25 minutes. Greater than 50% of total time was spent with the patient and / or family counseling and / or coordination of care. A description of the counseling / coordination of care: medication education, treatment plan, supportive therapy.    This note was completed in part utilizing Dragon dictation Software. Grammatical, translation, syntax errors, random word insertions, spelling mistakes, and incomplete sentences may be an occasional consequence of this system secondary to software limitations with voice recognition, ambient noise, and hardware issues. If you have any questions or concerns about the content, text, or information contained within the body of this dictation, please contact the provider for clarification

## 2023-12-20 NOTE — PROGRESS NOTES
12/20/23 0756   Team Meeting   Meeting Type Daily Rounds   Initial Conference Date 12/20/23   Next Conference Date 12/21/23   Team Members Present   Team Members Present Physician;Nurse;;;Occupational Therapist   Physician Team Member Dr Smith, Dr Collazo, SHEFALI Lovell   Nursing Team Member Jennifer   Care Management Team Member Atiya   Social Work Team Member Dalia TALBOT Team Member Yeison   Patient/Family Present   Patient Present No   Patient's Family Present No     Complaining of being told different things from providers as far as her discharge, told CM that she was not going to eat her lunch because she needs to lose weight.

## 2023-12-20 NOTE — PLAN OF CARE
Problem: Ineffective Coping  Goal: Identifies ineffective coping skills  Outcome: Progressing  Goal: Identifies healthy coping skills  Outcome: Progressing  Goal: Demonstrates healthy coping skills  Outcome: Progressing  Goal: Participates in unit activities  Description: Interventions:  - Provide therapeutic environment   - Provide required programming   - Redirect inappropriate behaviors   Outcome: Progressing  Goal: Patient/Family participate in treatment and DC plans  Description: Interventions:  - Provide therapeutic environment  Outcome: Progressing  Goal: Patient/Family verbalizes awareness of resources  Outcome: Progressing  Goal: Understands least restrictive measures  Description: Interventions:  - Utilize least restrictive behavior  Outcome: Progressing  Goal: Free from restraint events  Description: - Utilize least restrictive measures   - Provide behavioral interventions   - Redirect inappropriate behaviors   Outcome: Progressing     Problem: Risk for Self Injury/Neglect  Goal: Treatment Goal: Remain safe during length of stay, learn and adopt new coping skills, and be free of self-injurious ideation, impulses and acts at the time of discharge  Outcome: Progressing  Goal: Verbalize thoughts and feelings  Description: Interventions:  - Assess and re-assess patient's lethality and potential for self-injury  - Engage patient in 1:1 interactions, daily, for a minimum of 15 minutes  - Encourage patient to express feelings, fears, frustrations, hopes  - Establish rapport/trust with patient   Outcome: Progressing  Goal: Refrain from harming self  Description: Interventions:  - Monitor patient closely, per order  - Develop a trusting relationship  - Supervise medication ingestion, monitor effects and side effects   Outcome: Progressing  Goal: Attend and participate in unit activities, including therapeutic, recreational, and educational groups  Description: Interventions:  - Provide therapeutic and  educational activities daily, encourage attendance and participation, and document same in the medical record  - Obtain collateral information, encourage visitation and family involvement in care   Outcome: Progressing  Goal: Recognize maladaptive responses and adopt new coping mechanisms  Outcome: Progressing  Goal: Complete daily ADLs, including personal hygiene independently, as able  Description: Interventions:  - Observe, teach, and assist patient with ADLS  - Monitor and promote a balance of rest/activity, with adequate nutrition and elimination  Outcome: Progressing     Problem: Depression  Goal: Treatment Goal: Demonstrate behavioral control of depressive symptoms, verbalize feelings of improved mood/affect, and adopt new coping skills prior to discharge  Outcome: Progressing  Goal: Verbalize thoughts and feelings  Description: Interventions:  - Assess and re-assess patient's level of risk   - Engage patient in 1:1 interactions, daily, for a minimum of 15 minutes   - Encourage patient to express feelings, fears, frustrations, hopes   Outcome: Progressing  Goal: Refrain from harming self  Description: Interventions:  - Monitor patient closely, per order   - Supervise medication ingestion, monitor effects and side effects   Outcome: Progressing  Goal: Refrain from isolation  Description: Interventions:  - Develop a trusting relationship   - Encourage socialization   Outcome: Progressing  Goal: Refrain from self-neglect  Outcome: Progressing  Goal: Attend and participate in unit activities, including therapeutic, recreational, and educational groups  Description: Interventions:  - Provide therapeutic and educational activities daily, encourage attendance and participation, and document same in the medical record   Outcome: Progressing  Goal: Complete daily ADLs, including personal hygiene independently, as able  Description: Interventions:  - Observe, teach, and assist patient with ADLS  -  Monitor and promote  a balance of rest/activity, with adequate nutrition and elimination   Outcome: Progressing     Problem: Anxiety  Goal: Anxiety is at manageable level  Description: Interventions:  - Assess and monitor patient's anxiety level.   - Monitor for signs and symptoms (heart palpitations, chest pain, shortness of breath, headaches, nausea, feeling jumpy, restlessness, irritable, apprehensive).   - Collaborate with interdisciplinary team and initiate plan and interventions as ordered.  - [place] patient to unit/surroundings  - Explain treatment plan  - Encourage participation in care  - Encourage verbalization of concerns/fears  - Identify coping mechanisms  - Assist in developing anxiety-reducing skills  - Administer/offer alternative therapies  - Limit or eliminate stimulants  Outcome: Progressing     Problem: Risk for Violence/Aggression Toward Others  Goal: Treatment Goal: Refrain from acts of violence/aggression during length of stay, and demonstrate improved impulse control at the time of discharge  Outcome: Progressing  Goal: Verbalize thoughts and feelings  Description: Interventions:  - Assess and re-assess patient's level of risk, every waking shift  - Engage patient in 1:1 interactions, daily, for a minimum of 15 minutes   - Allow patient to express feelings and frustrations in a safe and non-threatening manner   - Establish rapport/trust with patient   Outcome: Progressing  Goal: Refrain from harming others  Outcome: Progressing  Goal: Refrain from destructive acts on the environment or property  Outcome: Progressing  Goal: Control angry outbursts  Description: Interventions:  - Monitor patient closely, per order  - Ensure early verbal de-escalation  - Monitor prn medication needs  - Set reasonable/therapeutic limits, outline behavioral expectations, and consequences   - Provide a non-threatening milieu, utilizing the least restrictive interventions   Outcome: Progressing  Goal: Attend and participate in unit  activities, including therapeutic, recreational, and educational groups  Description: Interventions:  - Provide therapeutic and educational activities daily, encourage attendance and participation, and document same in the medical record   Outcome: Progressing  Goal: Identify appropriate positive anger management techniques  Description: Interventions:  - Offer anger management and coping skills groups   - Staff will provide positive feedback for appropriate anger control  Outcome: Progressing     Problem: SAFETY, RESTRAINT - VIOLENT/SELF-DESTRUCTIVE  Goal: Remains free of harm/injury from restraints (Restraint for Violent/Self-Destructive Behavior)  Description: INTERVENTIONS:  - Instruct patient/family regarding restraint use   - Assess and monitor physiologic and psychological status   - Provide interventions and comfort measures to meet assessed patient needs   - Ensure continuous in person monitoring is provided   - Identify and implement measures to help patient regain control  - Assess readiness for release of restraint  Outcome: Progressing  Goal: Returns to optimal restraint-free functioning  Description: INTERVENTIONS:  - Assess the patient's behavior and symptoms that indicate continued need for restraint  - Identify and implement measures to help patient regain control  - Assess readiness for release of restraint   Outcome: Progressing

## 2023-12-20 NOTE — PROGRESS NOTES
12/20/23 1100   Activity/Group Checklist   Group Life Skills  (humor topic as a coping skill.)   Attendance Attended   Attendance Duration (min) 31-45   Interactions Other (Comment)  (P.t joined group with poor eye contact and blunt affect.A peer spoke for her,stating that she had just gotten some bad news,Pt. did not expand on the new yet did become slowly more social as group progressed.)   Affect/Mood Blunted/flat   Goals Achieved Able to listen to others

## 2023-12-20 NOTE — NURSING NOTE
Pt anxious and tearful but able to get self together speaking with .  Good appetite and steady gait.  Attended group.  VSS.  Med-compliant.  Denied SI.  Monitored for safety and support.

## 2023-12-20 NOTE — NURSING NOTE
"Pt withdrawn to room for most of this shift. Pt appeared anxious on approach and reported feeling \"homesick.\" Pt reported frustration stating \"I don't know what more they want me to prove to let me go home.\" Pt verbalizes that she feels that she has had a better grasp on her anger management stating \"I had a problem yesterday, and I waited until I saw Atiya to talk about it.\" Pt states that in the past she would have become angry \"right away.\" Pt pleasant with this writer and shows improved insight. Pt med/meal compliant. Pts gait remains steady and the pt remains independent for ADLs. Pt currently resting with even, unlabored respirations. Pt utilizing CPAP machine. Pt able to and encouraged to inform staff of any needs.  "

## 2023-12-20 NOTE — TREATMENT TEAM
"Pt attended all groups.  Pt tearful in am.  Pt stated her protective factors are her support cat and her dad.  Pt completed revised BH release prevention plan.  Signed and copy in chart.   Pt indicated I have \"all of the triggers stressors \" listed on the form.      12/20/23 1330   Activity/Group Checklist   Group Other (Comment)  (Relapse prevention plan and community supports)   Attendance Attended   Attendance Duration (min) 46-60   Interactions Interacted appropriately   Affect/Mood Appropriate   Goals Achieved Identified feelings;Able to engage in interactions;Able to listen to others;Able to reflect/comment on own behavior;Able to manage/cope with feelings;Able to self-disclose;Verbalized increased hopefulness       "

## 2023-12-21 PROCEDURE — 99232 SBSQ HOSP IP/OBS MODERATE 35: CPT | Performed by: STUDENT IN AN ORGANIZED HEALTH CARE EDUCATION/TRAINING PROGRAM

## 2023-12-21 RX ADMIN — LOSARTAN POTASSIUM 100 MG: 50 TABLET, FILM COATED ORAL at 08:33

## 2023-12-21 RX ADMIN — PALIPERIDONE PALMITATE 156 MG: 156 INJECTION INTRAMUSCULAR at 12:26

## 2023-12-21 RX ADMIN — OXYBUTYNIN CHLORIDE 5 MG: 5 TABLET ORAL at 17:16

## 2023-12-21 RX ADMIN — PALIPERIDONE 12 MG: 6 TABLET, EXTENDED RELEASE ORAL at 08:33

## 2023-12-21 RX ADMIN — Medication 250 MG: at 08:33

## 2023-12-21 RX ADMIN — TOPIRAMATE 200 MG: 100 TABLET, FILM COATED ORAL at 08:33

## 2023-12-21 RX ADMIN — CLONIDINE HYDROCHLORIDE 0.1 MG: 0.1 TABLET ORAL at 08:33

## 2023-12-21 RX ADMIN — FUROSEMIDE 20 MG: 20 TABLET ORAL at 08:33

## 2023-12-21 RX ADMIN — PANTOPRAZOLE SODIUM 40 MG: 40 TABLET, DELAYED RELEASE ORAL at 06:21

## 2023-12-21 RX ADMIN — OXYBUTYNIN CHLORIDE 5 MG: 5 TABLET ORAL at 08:33

## 2023-12-21 RX ADMIN — ATOMOXETINE 40 MG: 40 CAPSULE ORAL at 08:33

## 2023-12-21 RX ADMIN — CLONIDINE HYDROCHLORIDE 0.1 MG: 0.1 TABLET ORAL at 21:41

## 2023-12-21 RX ADMIN — OXCARBAZEPINE 450 MG: 150 TABLET, FILM COATED ORAL at 21:41

## 2023-12-21 RX ADMIN — CYANOCOBALAMIN TAB 1000 MCG 1000 MCG: 1000 TAB at 08:33

## 2023-12-21 RX ADMIN — LEVOTHYROXINE SODIUM 125 MCG: 125 TABLET ORAL at 06:21

## 2023-12-21 RX ADMIN — WARFARIN SODIUM 7 MG: 5 TABLET ORAL at 17:16

## 2023-12-21 RX ADMIN — Medication 250 MG: at 17:17

## 2023-12-21 RX ADMIN — MELATONIN TAB 3 MG 3 MG: 3 TAB at 21:41

## 2023-12-21 RX ADMIN — OXCARBAZEPINE 450 MG: 150 TABLET, FILM COATED ORAL at 08:33

## 2023-12-21 RX ADMIN — BUPROPION HYDROCHLORIDE 450 MG: 300 TABLET, EXTENDED RELEASE ORAL at 08:33

## 2023-12-21 RX ADMIN — TOPIRAMATE 200 MG: 100 TABLET, FILM COATED ORAL at 17:17

## 2023-12-21 RX ADMIN — NALTREXONE HYDROCHLORIDE 50 MG: 50 TABLET, FILM COATED ORAL at 08:33

## 2023-12-21 NOTE — PROGRESS NOTES
12/21/23 0805   Team Meeting   Meeting Type Daily Rounds   Initial Conference Date 12/21/23   Next Conference Date 12/22/23   Team Members Present   Team Members Present Physician;Nurse;;;Occupational Therapist   Physician Team Member Dr Smith, Dr Collazo, Dr Quispe, Virginia ISRAEL   Nursing Team Member Jennifer   Care Management Team Member Atiya   Social Work Team Member Dalia   OT Team Member Yeison   Patient/Family Present   Patient Present No   Patient's Family Present No     Paid her bills yesterday with CM, more positive, endorses anxiety and depression related to possible EAC placement, crying because she misses her cat, second dose of Invega sustenna today, will discontinue PO meds.

## 2023-12-21 NOTE — NURSING NOTE
"The patient has been isolative this evening. She is calm and cooperative with staff/peers. Patient endorses anxiety and depression due to possible EAC placement. She explained that she misses her cat \"Edward\". Patient denies SI/HI/AVH. The patient was med compliant. She has been utilizing her BIPAP at night. Safety checks ongoing.   "

## 2023-12-21 NOTE — CASE MANAGEMENT
Sat with the patient today to help her pay a bill as well as yesterday.     Received a call from Janene Long from  [facility] asking for a copy of patients most recent med list and when she got her [place], sent that fax to [phone] and then called her and left a message at [phone].

## 2023-12-21 NOTE — PLAN OF CARE
Problem: Ineffective Coping  Goal: Identifies ineffective coping skills  Outcome: Progressing  Goal: Identifies healthy coping skills  Outcome: Progressing  Goal: Demonstrates healthy coping skills  Outcome: Progressing  Goal: Participates in unit activities  Description: Interventions:  - Provide therapeutic environment   - Provide required programming   - Redirect inappropriate behaviors   Outcome: Progressing  Goal: Patient/Family participate in treatment and DC plans  Description: Interventions:  - Provide therapeutic environment  Outcome: Progressing  Goal: Patient/Family verbalizes awareness of resources  Outcome: Progressing  Goal: Understands least restrictive measures  Description: Interventions:  - Utilize least restrictive behavior  Outcome: Progressing  Goal: Free from restraint events  Description: - Utilize least restrictive measures   - Provide behavioral interventions   - Redirect inappropriate behaviors   Outcome: Progressing     Problem: Risk for Self Injury/Neglect  Goal: Treatment Goal: Remain safe during length of stay, learn and adopt new coping skills, and be free of self-injurious ideation, impulses and acts at the time of discharge  Outcome: Progressing  Goal: Verbalize thoughts and feelings  Description: Interventions:  - Assess and re-assess patient's lethality and potential for self-injury  - Engage patient in 1:1 interactions, daily, for a minimum of 15 minutes  - Encourage patient to express feelings, fears, frustrations, hopes  - Establish rapport/trust with patient   Outcome: Progressing  Goal: Refrain from harming self  Description: Interventions:  - Monitor patient closely, per order  - Develop a trusting relationship  - Supervise medication ingestion, monitor effects and side effects   Outcome: Progressing  Goal: Attend and participate in unit activities, including therapeutic, recreational, and educational groups  Description: Interventions:  - Provide therapeutic and  educational activities daily, encourage attendance and participation, and document same in the medical record  - Obtain collateral information, encourage visitation and family involvement in care   Outcome: Progressing  Goal: Recognize maladaptive responses and adopt new coping mechanisms  Outcome: Progressing  Goal: Complete daily ADLs, including personal hygiene independently, as able  Description: Interventions:  - Observe, teach, and assist patient with ADLS  - Monitor and promote a balance of rest/activity, with adequate nutrition and elimination  Outcome: Progressing     Problem: Depression  Goal: Treatment Goal: Demonstrate behavioral control of depressive symptoms, verbalize feelings of improved mood/affect, and adopt new coping skills prior to discharge  Outcome: Progressing  Goal: Verbalize thoughts and feelings  Description: Interventions:  - Assess and re-assess patient's level of risk   - Engage patient in 1:1 interactions, daily, for a minimum of 15 minutes   - Encourage patient to express feelings, fears, frustrations, hopes   Outcome: Progressing  Goal: Refrain from harming self  Description: Interventions:  - Monitor patient closely, per order   - Supervise medication ingestion, monitor effects and side effects   Outcome: Progressing  Goal: Refrain from isolation  Description: Interventions:  - Develop a trusting relationship   - Encourage socialization   Outcome: Progressing  Goal: Refrain from self-neglect  Outcome: Progressing  Goal: Attend and participate in unit activities, including therapeutic, recreational, and educational groups  Description: Interventions:  - Provide therapeutic and educational activities daily, encourage attendance and participation, and document same in the medical record   Outcome: Progressing  Goal: Complete daily ADLs, including personal hygiene independently, as able  Description: Interventions:  - Observe, teach, and assist patient with ADLS  -  Monitor and promote  a balance of rest/activity, with adequate nutrition and elimination   Outcome: Progressing

## 2023-12-21 NOTE — NURSING NOTE
Pt anxious and constrcited but pleasant and med-compliant with no tearful episodes noted.  Good appetite and steady gait.  Attended and participated actively in group.  Denied SI.  VSS.  Monitored for safety and support.

## 2023-12-21 NOTE — TREATMENT TEAM
Pt attended groups.  Pt brighter and more positive affect,.  Pt spontaneous with holiday memories and reminiscing.  Pt identified with [employer] and the [employer] (stating he always got in trouble) and several holiday songs including Andriy Delacruz. Supportive of roommate who was tearful.      12/21/23 1000   Activity/Group Checklist   Group Community meeting   Attendance Attended   Attendance Duration (min) 31-45   Interactions Interacted appropriately   Affect/Mood Bright   Goals Achieved Identified feelings;Identified relapse prevention strategies;Able to listen to others;Able to engage in interactions;Able to reflect/comment on own behavior;Able to manage/cope with feelings;Able to self-disclose;Verbalized increased hopefulness;Discussed coping strategies;Displayed empathy;Discussed self-esteem issues

## 2023-12-21 NOTE — NURSING NOTE
The patient requested medication for sleep. Patient was given Trazodone 50 mg at 2156. This was effective. Continue to monitor.

## 2023-12-22 LAB
INR PPP: 2.42 (ref 0.84–1.19)
PROTHROMBIN TIME: 26.6 SECONDS (ref 11.6–14.5)

## 2023-12-22 PROCEDURE — 85610 PROTHROMBIN TIME: CPT | Performed by: NURSE PRACTITIONER

## 2023-12-22 PROCEDURE — 99232 SBSQ HOSP IP/OBS MODERATE 35: CPT

## 2023-12-22 RX ORDER — ESTRADIOL 0.5 MG/1
0.5 TABLET ORAL DAILY
Qty: 30 TABLET | Refills: 3 | OUTPATIENT
Start: 2023-12-22

## 2023-12-22 RX ADMIN — ATOMOXETINE 40 MG: 40 CAPSULE ORAL at 08:58

## 2023-12-22 RX ADMIN — LOSARTAN POTASSIUM 100 MG: 50 TABLET, FILM COATED ORAL at 08:33

## 2023-12-22 RX ADMIN — TOPIRAMATE 200 MG: 100 TABLET, FILM COATED ORAL at 08:33

## 2023-12-22 RX ADMIN — OXYBUTYNIN CHLORIDE 5 MG: 5 TABLET ORAL at 08:33

## 2023-12-22 RX ADMIN — MELATONIN TAB 3 MG 3 MG: 3 TAB at 22:04

## 2023-12-22 RX ADMIN — TOPIRAMATE 200 MG: 100 TABLET, FILM COATED ORAL at 17:10

## 2023-12-22 RX ADMIN — CLONIDINE HYDROCHLORIDE 0.1 MG: 0.1 TABLET ORAL at 22:05

## 2023-12-22 RX ADMIN — BUPROPION HYDROCHLORIDE 450 MG: 300 TABLET, EXTENDED RELEASE ORAL at 08:33

## 2023-12-22 RX ADMIN — LEVOTHYROXINE SODIUM 125 MCG: 125 TABLET ORAL at 05:43

## 2023-12-22 RX ADMIN — CLONIDINE HYDROCHLORIDE 0.1 MG: 0.1 TABLET ORAL at 08:33

## 2023-12-22 RX ADMIN — OXCARBAZEPINE 450 MG: 150 TABLET, FILM COATED ORAL at 08:58

## 2023-12-22 RX ADMIN — Medication 250 MG: at 17:10

## 2023-12-22 RX ADMIN — PANTOPRAZOLE SODIUM 40 MG: 40 TABLET, DELAYED RELEASE ORAL at 05:43

## 2023-12-22 RX ADMIN — OXYBUTYNIN CHLORIDE 5 MG: 5 TABLET ORAL at 17:10

## 2023-12-22 RX ADMIN — OXCARBAZEPINE 450 MG: 150 TABLET, FILM COATED ORAL at 22:04

## 2023-12-22 RX ADMIN — CYANOCOBALAMIN TAB 1000 MCG 1000 MCG: 1000 TAB at 08:58

## 2023-12-22 RX ADMIN — FUROSEMIDE 20 MG: 20 TABLET ORAL at 08:33

## 2023-12-22 RX ADMIN — WARFARIN SODIUM 7 MG: 5 TABLET ORAL at 17:10

## 2023-12-22 RX ADMIN — Medication 250 MG: at 08:33

## 2023-12-22 RX ADMIN — NALTREXONE HYDROCHLORIDE 50 MG: 50 TABLET, FILM COATED ORAL at 08:33

## 2023-12-22 NOTE — PROGRESS NOTES
Pt.emphasized the positive of spending time with her father.   12/22/23 1330   Activity/Group Checklist   Group Life Skills  (tasks on holiday name that song and grievances and positives of life.)   Attendance Attended   Attendance Duration (min) 46-60   Interactions Other (Comment)  (smiling throughout session especially when given the opportunity to states pet peeves.Pt. displayed overall bright affect and socially appropriate in session.)   Affect/Mood Bright;Normal range   Goals Achieved Able to listen to others;Able to engage in interactions;Discussed coping strategies;Identified feelings

## 2023-12-22 NOTE — TREATMENT TEAM
12/22/23 0820   Team Meeting   Meeting Type Daily Rounds   Initial Conference Date 12/22/23   Team Members Present   Team Members Present Physician;Nurse;Occupational Therapist;   Physician Team Member Dr Quispe, [place] ISRAEL, Kaela Alvarado PA-C, Giana ISRAEL, Dr Omer   Nursing Team Member Jennifer CURRY   Care Management Team Member Jennifer DSOUZA   OT Team Member Yeison   Patient/Family Present   Patient Present No   Patient's Family Present No     Humorous, social, reports working on her anger, poss EAC referral

## 2023-12-22 NOTE — NURSING NOTE
Pt anxious with even affect.  No tearfulness noted.  Good appetite and steady gait.  VSS.  Denied Si.  Monitored for safety and support.

## 2023-12-22 NOTE — PLAN OF CARE
Problem: Ineffective Coping  Goal: Identifies ineffective coping skills  Outcome: Progressing  Goal: Identifies healthy coping skills  Outcome: Progressing  Goal: Demonstrates healthy coping skills  Outcome: Progressing  Goal: Participates in unit activities  Description: Interventions:  - Provide therapeutic environment   - Provide required programming   - Redirect inappropriate behaviors   Outcome: Progressing  Goal: Patient/Family participate in treatment and DC plans  Description: Interventions:  - Provide therapeutic environment  Outcome: Progressing  Goal: Patient/Family verbalizes awareness of resources  Outcome: Progressing  Goal: Understands least restrictive measures  Description: Interventions:  - Utilize least restrictive behavior  Outcome: Progressing  Goal: Free from restraint events  Description: - Utilize least restrictive measures   - Provide behavioral interventions   - Redirect inappropriate behaviors   Outcome: Progressing     Problem: Risk for Self Injury/Neglect  Goal: Treatment Goal: Remain safe during length of stay, learn and adopt new coping skills, and be free of self-injurious ideation, impulses and acts at the time of discharge  Outcome: Progressing  Goal: Verbalize thoughts and feelings  Description: Interventions:  - Assess and re-assess patient's lethality and potential for self-injury  - Engage patient in 1:1 interactions, daily, for a minimum of 15 minutes  - Encourage patient to express feelings, fears, frustrations, hopes  - Establish rapport/trust with patient   Outcome: Progressing  Goal: Refrain from harming self  Description: Interventions:  - Monitor patient closely, per order  - Develop a trusting relationship  - Supervise medication ingestion, monitor effects and side effects   Outcome: Progressing  Goal: Attend and participate in unit activities, including therapeutic, recreational, and educational groups  Description: Interventions:  - Provide therapeutic and  educational activities daily, encourage attendance and participation, and document same in the medical record  - Obtain collateral information, encourage visitation and family involvement in care   Outcome: Progressing  Goal: Recognize maladaptive responses and adopt new coping mechanisms  Outcome: Progressing  Goal: Complete daily ADLs, including personal hygiene independently, as able  Description: Interventions:  - Observe, teach, and assist patient with ADLS  - Monitor and promote a balance of rest/activity, with adequate nutrition and elimination  Outcome: Progressing     Problem: Depression  Goal: Treatment Goal: Demonstrate behavioral control of depressive symptoms, verbalize feelings of improved mood/affect, and adopt new coping skills prior to discharge  Outcome: Progressing  Goal: Verbalize thoughts and feelings  Description: Interventions:  - Assess and re-assess patient's level of risk   - Engage patient in 1:1 interactions, daily, for a minimum of 15 minutes   - Encourage patient to express feelings, fears, frustrations, hopes   Outcome: Progressing  Goal: Refrain from harming self  Description: Interventions:  - Monitor patient closely, per order   - Supervise medication ingestion, monitor effects and side effects   Outcome: Progressing  Goal: Refrain from isolation  Description: Interventions:  - Develop a trusting relationship   - Encourage socialization   Outcome: Progressing  Goal: Refrain from self-neglect  Outcome: Progressing  Goal: Attend and participate in unit activities, including therapeutic, recreational, and educational groups  Description: Interventions:  - Provide therapeutic and educational activities daily, encourage attendance and participation, and document same in the medical record   Outcome: Progressing  Goal: Complete daily ADLs, including personal hygiene independently, as able  Description: Interventions:  - Observe, teach, and assist patient with ADLS  -  Monitor and promote  a balance of rest/activity, with adequate nutrition and elimination   Outcome: Progressing     Problem: Anxiety  Goal: Anxiety is at manageable level  Description: Interventions:  - Assess and monitor patient's anxiety level.   - Monitor for signs and symptoms (heart palpitations, chest pain, shortness of breath, headaches, nausea, feeling jumpy, restlessness, irritable, apprehensive).   - Collaborate with interdisciplinary team and initiate plan and interventions as ordered.  - [place] patient to unit/surroundings  - Explain treatment plan  - Encourage participation in care  - Encourage verbalization of concerns/fears  - Identify coping mechanisms  - Assist in developing anxiety-reducing skills  - Administer/offer alternative therapies  - Limit or eliminate stimulants  Outcome: Progressing     Problem: Risk for Violence/Aggression Toward Others  Goal: Treatment Goal: Refrain from acts of violence/aggression during length of stay, and demonstrate improved impulse control at the time of discharge  Outcome: Progressing  Goal: Verbalize thoughts and feelings  Description: Interventions:  - Assess and re-assess patient's level of risk, every waking shift  - Engage patient in 1:1 interactions, daily, for a minimum of 15 minutes   - Allow patient to express feelings and frustrations in a safe and non-threatening manner   - Establish rapport/trust with patient   Outcome: Progressing  Goal: Refrain from harming others  Outcome: Progressing  Goal: Refrain from destructive acts on the environment or property  Outcome: Progressing  Goal: Control angry outbursts  Description: Interventions:  - Monitor patient closely, per order  - Ensure early verbal de-escalation  - Monitor prn medication needs  - Set reasonable/therapeutic limits, outline behavioral expectations, and consequences   - Provide a non-threatening milieu, utilizing the least restrictive interventions   Outcome: Progressing  Goal: Attend and participate in unit  activities, including therapeutic, recreational, and educational groups  Description: Interventions:  - Provide therapeutic and educational activities daily, encourage attendance and participation, and document same in the medical record   Outcome: Progressing  Goal: Identify appropriate positive anger management techniques  Description: Interventions:  - Offer anger management and coping skills groups   - Staff will provide positive feedback for appropriate anger control  Outcome: Progressing     Problem: SAFETY, RESTRAINT - VIOLENT/SELF-DESTRUCTIVE  Goal: Remains free of harm/injury from restraints (Restraint for Violent/Self-Destructive Behavior)  Description: INTERVENTIONS:  - Instruct patient/family regarding restraint use   - Assess and monitor physiologic and psychological status   - Provide interventions and comfort measures to meet assessed patient needs   - Ensure continuous in person monitoring is provided   - Identify and implement measures to help patient regain control  - Assess readiness for release of restraint  Outcome: Progressing  Goal: Returns to optimal restraint-free functioning  Description: INTERVENTIONS:  - Assess the patient's behavior and symptoms that indicate continued need for restraint  - Identify and implement measures to help patient regain control  - Assess readiness for release of restraint   Outcome: Progressing

## 2023-12-22 NOTE — PROGRESS NOTES
Progress Note - Behavioral Health   Blanca Mason 53 y.o. female MRN: [id number]  Unit/Bed#: [facility] 651-02 Encounter: [id number]    Patient was seen today for continuation of care, records reviewed and patient was discussed with the morning case review team.    Blanca was seen today for psychiatric follow-up.  On assessment today, Blanca was calm and smiling during her interview.  Stating that she slept very well overnight, patient also states that she is making friends on the unit and is getting along well.  Mild depression associated with not being able to see her cat, anxiety is stated as more controlled.  No medication changes to be made at this time, SNOWDEN Invega Sustenna given on 12/21/2023 with no adverse effects noted.  Discharge disposition ongoing with patient most likely need a referral to [facility] due to elevated risk of suicidality.    Blanca denies acute suicidal/self-harm ideation/intent/plan upon direct inquiry at this time.  Blanca remains behaviorally appropriate, no agitation or aggression noted on exam or in report.  Blanca also denies HI/AH/VH, and does not appear overtly manic.  No overt delusions or paranoia are verbalized. Impulse control is intact.  Blanca remains adherent to her current psychotropic medication regimen and denies any side effects from medications, as well as none noted on exam.    Vitals:  Vitals:    12/22/23 0744   BP: 147/68   Pulse: 75   Resp: 17   Temp: (!) 97.1 °F (36.2 °C)   SpO2: 96%       Laboratory Results:  I have personally reviewed all pertinent laboratory/tests results.  Most Recent Labs:   Lab Results   Component Value Date    WBC 8.96 12/02/2023    RBC 4.34 12/02/2023    HGB 12.9 12/02/2023    HCT 40.5 12/02/2023     12/02/2023    RDW 14.2 12/02/2023    TOTANEUTABS 4.73 11/19/2023    NEUTROABS 5.77 12/02/2023    SODIUM 139 12/02/2023    K 3.8 12/02/2023     12/02/2023    CO2 21 12/02/2023    BUN 13 12/02/2023    CREATININE 0.62 12/02/2023    GLUC 107 12/02/2023     GLUF 107 (H) 12/02/2023    CALCIUM 8.7 12/02/2023    AST 13 12/02/2023    ALT 13 12/02/2023    ALKPHOS 55 12/02/2023    TP 6.0 (L) 12/02/2023    ALB 3.5 12/02/2023    TBILI 0.25 12/02/2023    CHOLESTEROL 234 (H) 03/09/2023    HDL 71 03/09/2023    TRIG 153 (H) 03/09/2023    LDLCALC 132 (H) 03/09/2023    NONHDLC 163 03/09/2023    VALPROICTOT 23 (L) 11/10/2023    AMMONIA 63 11/06/2023    RAV1YELNPJVB 4.324 12/02/2023    FREET4 0.81 11/06/2023    T3FREE 2.27 (L) 10/16/2019    PREGUR negative 07/13/2022    PREGSERUM Negative 11/06/2023    RPR Non-Reactive 07/17/2022    HGBA1C 5.0 01/05/2023    EAG 97 01/05/2023       Psychiatric Review of Systems:  Behavior over the last 24 hours:  unchanged.   Sleep: better  Appetite: good  Medication side effects: none  ROS: no complaints, denies shortness of breath or chest pain and all other systems are negative for acute changes    Mental Status Evaluation:  Appearance:  dressed appropriately, adequate grooming   Behavior:  calm, more redirectable   Speech:  normal rate and volume   Mood:  anxious, labile   Affect:  constricted   Thought Process:  linear   Thought Content:  no overt delusions   Perceptual Disturbances: denies auditory hallucinations when asked, does not appear responding to internal stimuli   Risk Potential: Suicidal ideation - None  Homicidal ideation - None  Potential for aggression - No   Memory:  recent and remote memory grossly intact   Sensorium  person, place, and time/date      Consciousness:  alert and awake   Attention: attention span and concentration are age appropriate   Insight:  limited   Judgment: limited   Gait/Station: normal gait/station   Motor Activity: no abnormal movements   Progress Toward Goals:   Blanca is progressing towards goals of inpatient psychiatric treatment by continued medication compliance and is attending therapeutic modalities on the milieu. However, the patient continues to require inpatient psychiatric hospitalization for  continued medication management and titration to optimize symptom reduction, improve sleep hygiene, and demonstrate adequate self-care.    Assessment/Plan   Principal Problem:    Schizoaffective disorder, bipolar type (HCC)  Active Problems:    Benign essential hypertension    Edema    Hypothyroidism    MAG (obstructive sleep apnea)    Overactive bladder    Sjogren's syndrome (HCC)    Medical clearance for psychiatric admission    Anxiety    Borderline personality disorder (HCC)    History of pulmonary embolism    Ingrown toenail      Recommended Treatment: Treatment plan and medication changes discussed and per the attending physician the plan is:    1.Continue with group therapy, milieu therapy and occupational therapy  2.Behavioral Health checks every 7 minutes  3.Continue frequent safety checks and vitals per unit protocol  4.Continue with [facility] medical management as indicated  5.Continue with current medication regimen  6.Will review labs in the a.m.  7.Disposition Planning: Discharge planning and efforts remain ongoing    Behavioral Health Medications: all current active meds have been reviewed and continue current psychiatric medications.  Current Facility-Administered Medications   Medication Dose Route Frequency Provider Last Rate    aluminum-magnesium hydroxide-simethicone  30 mL Oral Q4H PRN JHONATAN Fields      atoMOXetine  40 mg Oral Daily Anatoly Prajapati, DO      buPROPion  450 mg Oral Daily Anatoly Prajapati, DO      cloNIDine  0.1 mg Oral Q12H [facility] Anatoly Prajapati, DO      cyanocobalamin  1,000 mcg Oral Daily JHONATAN Berg      cyanocobalamin  1,000 mcg Intramuscular Q30 Days JHONATAN Berg      ergocalciferol  50,000 Units Oral Weekly JHONATAN Berg      furosemide  20 mg Oral Daily JHONATAN Fields      hydrOXYzine HCL  25 mg Oral Q6H PRN Max 4/day JHONATAN Anne      hydrOXYzine HCL  50 mg Oral Q6H PRN Max 4/day Virginia Mccullough  CRNP      ibuprofen  200 mg Oral Q6H PRN Virginia Mccullough, CRNP      ibuprofen  400 mg Oral Q6H PRN Virginia Mccullough, CRNP      ibuprofen  600 mg Oral Q8H PRN Virginia Mccullough, CRNP      levothyroxine  125 mcg Oral Early Morning Wendy Anderson, CRNP      loperamide  2 mg Oral Q4H PRN Anatoly Prajapati, DO      LORazepam  1 mg Intramuscular Q6H PRN Max 3/day Virginia Mccullough, CRNP      LORazepam  1 mg Oral Q6H PRN Max 3/day Virginia Mccullough, CRNP      losartan  100 mg Oral Daily Wendy Anderson, CRNP      melatonin  3 mg Oral HS Frederick Smith MD      naltrexone  50 mg Oral Daily Anatoly Prajapati, DO      OLANZapine  10 mg Oral Q6H PRN Frederick Smith MD      Or    OLANZapine  10 mg Intramuscular Q6H PRN Frederick Smith MD      OLANZapine  5 mg Oral Q6H PRN Frederick Smith MD      Or    OLANZapine  5 mg Intramuscular Q6H PRN Frederick Smith MD      OLANZapine  2.5 mg Oral Q3H PRN Frederick Smith MD      OXcarbazepine  450 mg Oral Q12H [facility] Frederick Smith MD      oxybutynin  5 mg Oral BID JHONATAN Fields      [START ON 1/15/2024] paliperidone  234 mg IM- Deltoid Q4 weeks Frederick Smith MD      pantoprazole  40 mg Oral Early Morning Wendy Anderson, JHONATAN      polyethylene glycol  17 g Oral Daily PRN Virginia Mccullough, JHONATAN      saccharomyces boulardii  250 mg Oral BID Anatoly Prajapati, DO      senna-docusate sodium  1 tablet Oral Daily PRN Virginia Mccullough, JHONATAN      topiramate  200 mg Oral BID Anatoly Prajapati, DO      traZODone  50 mg Oral HS PRN JHONATAN Anne      warfarin  7 mg Oral Daily (warfarin) JHONATAN Berg         Risks / Benefits of Treatment:  Risks, benefits, and possible side effects of medications explained to patient and patient verbalizes understanding and agreement for treatment.    Counseling / Coordination of Care:  Patient's progress reviewed with nursing staff.  Medications, treatment progress and treatment plan reviewed with patient.  Supportive  counseling provided to the patient.    Total floor/unit time spent today 25 minutes. Greater than 50% of total time was spent with the patient and / or family counseling and / or coordination of care. A description of the counseling / coordination of care: medication education, treatment plan, supportive therapy.    This note was completed in part utilizing Dragon dictation Software. Grammatical, translation, syntax errors, random word insertions, spelling mistakes, and incomplete sentences may be an occasional consequence of this system secondary to software limitations with voice recognition, ambient noise, and hardware issues. If you have any questions or concerns about the content, text, or information contained within the body of this dictation, please contact the provider for clarification

## 2023-12-22 NOTE — NURSING NOTE
"Pt visible throughout milieu this evening. Pt pleasant on approach and reported feeling \"better now that I got things situated.\" Pt denied all psych symptoms. Pt jovial with staff and peers. Pt med/meal compliant. Pts gait remains steady and the pt remains independent for ADLs. Pt currently wearing CPAP with even, unlabored respirations. Pt able to and encouraged to inform staff of any needs.  "

## 2023-12-23 PROCEDURE — 99232 SBSQ HOSP IP/OBS MODERATE 35: CPT | Performed by: PHYSICIAN ASSISTANT

## 2023-12-23 RX ADMIN — LEVOTHYROXINE SODIUM 125 MCG: 125 TABLET ORAL at 06:02

## 2023-12-23 RX ADMIN — OXCARBAZEPINE 450 MG: 150 TABLET, FILM COATED ORAL at 08:46

## 2023-12-23 RX ADMIN — Medication 250 MG: at 08:46

## 2023-12-23 RX ADMIN — BUPROPION HYDROCHLORIDE 450 MG: 300 TABLET, EXTENDED RELEASE ORAL at 08:46

## 2023-12-23 RX ADMIN — WARFARIN SODIUM 7 MG: 5 TABLET ORAL at 17:32

## 2023-12-23 RX ADMIN — OXYBUTYNIN CHLORIDE 5 MG: 5 TABLET ORAL at 17:32

## 2023-12-23 RX ADMIN — OXCARBAZEPINE 450 MG: 150 TABLET, FILM COATED ORAL at 21:44

## 2023-12-23 RX ADMIN — ATOMOXETINE 40 MG: 40 CAPSULE ORAL at 08:46

## 2023-12-23 RX ADMIN — OXYBUTYNIN CHLORIDE 5 MG: 5 TABLET ORAL at 08:46

## 2023-12-23 RX ADMIN — FUROSEMIDE 20 MG: 20 TABLET ORAL at 08:46

## 2023-12-23 RX ADMIN — PANTOPRAZOLE SODIUM 40 MG: 40 TABLET, DELAYED RELEASE ORAL at 06:02

## 2023-12-23 RX ADMIN — NALTREXONE HYDROCHLORIDE 50 MG: 50 TABLET, FILM COATED ORAL at 08:46

## 2023-12-23 RX ADMIN — MELATONIN TAB 3 MG 3 MG: 3 TAB at 21:44

## 2023-12-23 RX ADMIN — CYANOCOBALAMIN TAB 1000 MCG 1000 MCG: 1000 TAB at 08:46

## 2023-12-23 RX ADMIN — LOSARTAN POTASSIUM 100 MG: 50 TABLET, FILM COATED ORAL at 08:46

## 2023-12-23 RX ADMIN — TOPIRAMATE 200 MG: 100 TABLET, FILM COATED ORAL at 17:32

## 2023-12-23 RX ADMIN — Medication 250 MG: at 17:32

## 2023-12-23 RX ADMIN — CLONIDINE HYDROCHLORIDE 0.1 MG: 0.1 TABLET ORAL at 08:46

## 2023-12-23 RX ADMIN — CLONIDINE HYDROCHLORIDE 0.1 MG: 0.1 TABLET ORAL at 21:44

## 2023-12-23 RX ADMIN — TOPIRAMATE 200 MG: 100 TABLET, FILM COATED ORAL at 08:46

## 2023-12-23 NOTE — NURSING NOTE
"Pt cooperative and overall pleasant on approach. Labile in mood; w/ sudden mood shifts. Denies SI/HI./ Denies psychosis. [place] bright and hyper verbal at times. Focused on visitation w/ Dad tomorrow. Pt becomes paranoid, tearful, and anxious when staff questions family meeting, and informs Pt current staff was unaware of visitation. Pt states, \"I feel like I'm trying to be set up.\" Staff offers support and redirection. Significant 1:1 time provided. Pt is able to turn it around w/ no need to utilize PRN medications. Q7's maintained. Will cont to provide support and redirection as needed.   "

## 2023-12-23 NOTE — PLAN OF CARE
Problem: Risk for Self Injury/Neglect  Goal: Treatment Goal: Remain safe during length of stay, learn and adopt new coping skills, and be free of self-injurious ideation, impulses and acts at the time of discharge  Outcome: Progressing  Goal: Verbalize thoughts and feelings  Description: Interventions:  - Assess and re-assess patient's lethality and potential for self-injury  - Engage patient in 1:1 interactions, daily, for a minimum of 15 minutes  - Encourage patient to express feelings, fears, frustrations, hopes  - Establish rapport/trust with patient   Outcome: Progressing  Goal: Refrain from harming self  Description: Interventions:  - Monitor patient closely, per order  - Develop a trusting relationship  - Supervise medication ingestion, monitor effects and side effects   Outcome: Progressing  Goal: Attend and participate in unit activities, including therapeutic, recreational, and educational groups  Description: Interventions:  - Provide therapeutic and educational activities daily, encourage attendance and participation, and document same in the medical record  - Obtain collateral information, encourage visitation and family involvement in care   Outcome: Progressing  Goal: Recognize maladaptive responses and adopt new coping mechanisms  Outcome: Progressing  Goal: Complete daily ADLs, including personal hygiene independently, as able  Description: Interventions:  - Observe, teach, and assist patient with ADLS  - Monitor and promote a balance of rest/activity, with adequate nutrition and elimination  Outcome: Progressing     Problem: Depression  Goal: Treatment Goal: Demonstrate behavioral control of depressive symptoms, verbalize feelings of improved mood/affect, and adopt new coping skills prior to discharge  Outcome: Progressing  Goal: Verbalize thoughts and feelings  Description: Interventions:  - Assess and re-assess patient's level of risk   - Engage patient in 1:1 interactions, daily, for a minimum  of 15 minutes   - Encourage patient to express feelings, fears, frustrations, hopes   Outcome: Progressing  Goal: Refrain from harming self  Description: Interventions:  - Monitor patient closely, per order   - Supervise medication ingestion, monitor effects and side effects   Outcome: Progressing  Goal: Refrain from isolation  Description: Interventions:  - Develop a trusting relationship   - Encourage socialization   Outcome: Progressing  Goal: Refrain from self-neglect  Outcome: Progressing  Goal: Attend and participate in unit activities, including therapeutic, recreational, and educational groups  Description: Interventions:  - Provide therapeutic and educational activities daily, encourage attendance and participation, and document same in the medical record   Outcome: Progressing  Goal: Complete daily ADLs, including personal hygiene independently, as able  Description: Interventions:  - Observe, teach, and assist patient with ADLS  -  Monitor and promote a balance of rest/activity, with adequate nutrition and elimination   Outcome: Progressing     Problem: Anxiety  Goal: Anxiety is at manageable level  Description: Interventions:  - Assess and monitor patient's anxiety level.   - Monitor for signs and symptoms (heart palpitations, chest pain, shortness of breath, headaches, nausea, feeling jumpy, restlessness, irritable, apprehensive).   - Collaborate with interdisciplinary team and initiate plan and interventions as ordered.  - [place] patient to unit/surroundings  - Explain treatment plan  - Encourage participation in care  - Encourage verbalization of concerns/fears  - Identify coping mechanisms  - Assist in developing anxiety-reducing skills  - Administer/offer alternative therapies  - Limit or eliminate stimulants  Outcome: Progressing     Problem: Risk for Violence/Aggression Toward Others  Goal: Treatment Goal: Refrain from acts of violence/aggression during length of stay, and demonstrate improved  impulse control at the time of discharge  Outcome: Progressing  Goal: Verbalize thoughts and feelings  Description: Interventions:  - Assess and re-assess patient's level of risk, every waking shift  - Engage patient in 1:1 interactions, daily, for a minimum of 15 minutes   - Allow patient to express feelings and frustrations in a safe and non-threatening manner   - Establish rapport/trust with patient   Outcome: Progressing  Goal: Refrain from harming others  Outcome: Progressing  Goal: Refrain from destructive acts on the environment or property  Outcome: Progressing  Goal: Control angry outbursts  Description: Interventions:  - Monitor patient closely, per order  - Ensure early verbal de-escalation  - Monitor prn medication needs  - Set reasonable/therapeutic limits, outline behavioral expectations, and consequences   - Provide a non-threatening milieu, utilizing the least restrictive interventions   Outcome: Progressing  Goal: Attend and participate in unit activities, including therapeutic, recreational, and educational groups  Description: Interventions:  - Provide therapeutic and educational activities daily, encourage attendance and participation, and document same in the medical record   Outcome: Progressing  Goal: Identify appropriate positive anger management techniques  Description: Interventions:  - Offer anger management and coping skills groups   - Staff will provide positive feedback for appropriate anger control  Outcome: Progressing     Problem: SAFETY, RESTRAINT - VIOLENT/SELF-DESTRUCTIVE  Goal: Remains free of harm/injury from restraints (Restraint for Violent/Self-Destructive Behavior)  Description: INTERVENTIONS:  - Instruct patient/family regarding restraint use   - Assess and monitor physiologic and psychological status   - Provide interventions and comfort measures to meet assessed patient needs   - Ensure continuous in person monitoring is provided   - Identify and implement measures to  help patient regain control  - Assess readiness for release of restraint  Outcome: Progressing  Goal: Returns to optimal restraint-free functioning  Description: INTERVENTIONS:  - Assess the patient's behavior and symptoms that indicate continued need for restraint  - Identify and implement measures to help patient regain control  - Assess readiness for release of restraint   Outcome: Progressing
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
## 2023-12-23 NOTE — NURSING NOTE
Pt mildly cooperative. Present in milieu interacting with peers appropriately. Appetite good. Medication compliant. Denies SI.

## 2023-12-24 PROCEDURE — 99232 SBSQ HOSP IP/OBS MODERATE 35: CPT | Performed by: STUDENT IN AN ORGANIZED HEALTH CARE EDUCATION/TRAINING PROGRAM

## 2023-12-24 RX ADMIN — WARFARIN SODIUM 7 MG: 5 TABLET ORAL at 17:44

## 2023-12-24 RX ADMIN — MELATONIN TAB 3 MG 3 MG: 3 TAB at 21:31

## 2023-12-24 RX ADMIN — TRAZODONE HYDROCHLORIDE 50 MG: 50 TABLET ORAL at 21:46

## 2023-12-24 RX ADMIN — LEVOTHYROXINE SODIUM 125 MCG: 125 TABLET ORAL at 06:05

## 2023-12-24 RX ADMIN — TOPIRAMATE 200 MG: 100 TABLET, FILM COATED ORAL at 17:41

## 2023-12-24 RX ADMIN — FUROSEMIDE 20 MG: 20 TABLET ORAL at 08:10

## 2023-12-24 RX ADMIN — ATOMOXETINE 40 MG: 40 CAPSULE ORAL at 08:13

## 2023-12-24 RX ADMIN — OXCARBAZEPINE 450 MG: 150 TABLET, FILM COATED ORAL at 08:11

## 2023-12-24 RX ADMIN — OXYBUTYNIN CHLORIDE 5 MG: 5 TABLET ORAL at 08:10

## 2023-12-24 RX ADMIN — TOPIRAMATE 200 MG: 100 TABLET, FILM COATED ORAL at 08:10

## 2023-12-24 RX ADMIN — NALTREXONE HYDROCHLORIDE 50 MG: 50 TABLET, FILM COATED ORAL at 08:10

## 2023-12-24 RX ADMIN — HYDROXYZINE HYDROCHLORIDE 50 MG: 50 TABLET, FILM COATED ORAL at 09:56

## 2023-12-24 RX ADMIN — PANTOPRAZOLE SODIUM 40 MG: 40 TABLET, DELAYED RELEASE ORAL at 06:05

## 2023-12-24 RX ADMIN — LOSARTAN POTASSIUM 100 MG: 50 TABLET, FILM COATED ORAL at 08:10

## 2023-12-24 RX ADMIN — OXCARBAZEPINE 450 MG: 150 TABLET, FILM COATED ORAL at 21:31

## 2023-12-24 RX ADMIN — BUPROPION HYDROCHLORIDE 450 MG: 300 TABLET, EXTENDED RELEASE ORAL at 08:10

## 2023-12-24 RX ADMIN — CLONIDINE HYDROCHLORIDE 0.1 MG: 0.1 TABLET ORAL at 21:31

## 2023-12-24 RX ADMIN — CYANOCOBALAMIN TAB 1000 MCG 1000 MCG: 1000 TAB at 08:11

## 2023-12-24 RX ADMIN — OXYBUTYNIN CHLORIDE 5 MG: 5 TABLET ORAL at 17:44

## 2023-12-24 RX ADMIN — Medication 250 MG: at 17:42

## 2023-12-24 RX ADMIN — CLONIDINE HYDROCHLORIDE 0.1 MG: 0.1 TABLET ORAL at 08:11

## 2023-12-24 RX ADMIN — Medication 250 MG: at 08:11

## 2023-12-24 NOTE — NURSING NOTE
"Pt complains of \"tremors due to anxiety.\" Requests PRN medication.  Describes feeling anxious due to upcoming visit w/ Dad this am. Agrees she will be able to remain controlled in mood and behavior during/after family visit. 50 mg Atarax administered for a minaya of  18. Will monitor for effectiveness and continue to encourage use of coping skills    "

## 2023-12-24 NOTE — PLAN OF CARE

## 2023-12-24 NOTE — PROGRESS NOTES
"Progress Note - Behavioral Health     Blanca Mason 53 y.o. female MRN: [id number]  Unit/Bed#: [facility] 651-02 Encounter: [id number]      Blanca Mason was seen for continuing care and reviewed with treatment team.  Pt was pleasant on approach, though still a bit labile, though toned down today.  She was smiling as she reported some anxiety but also that \"I'm excited in a happy way\"  -- in anticipation of her father coming to visit her today.  She reports coloring a picture for him.  She was briefly almost tearful at the prospect of him not visiting but quickly regained full composure.   Pt presently denies depression, SI, HI, or psychotic Sxs.  She reported sleeping \"Good last night\" and appetite is \"Pretty good.\"  She is noted to have a tremor of Rt wrist and cassandra hands, but is not quite sure if it is solely due to her anxiety or a SE of her medicine.  She agreed to try prn Benztropine.      Per EMR and floor team, Pt has been calm, cooperative and medication compliant, though can be labile/tearful at times.  Today, she is more even, and has been visible in the milieu with appropriate behavior among peers.    Sleep:Good  Appetite: Good   Medication side effects: As per HPI    ROS: As per HPI, (All else negative)    Labs/Tests: Reviewed    Mental Status Evaluation:  Appearance:  Dressed, clean, good eye contact   Behavior:  Calm, cooperative, pleasant   Speech:  Clear, normal rate and volume   Mood:  Anxious   Affect:  Appropriately broad   Thought Process:  Organized, Goal directed, Ruminative   Associations: Intact associations   Thought Content:  No verbalized delusions   Perceptual Disturbances: Pt denies any hallucinations or paranoia and does not appear to be responding to internal stimuli   Risk Potential: Pt presently denies SI or HI    Sensorium:  Self, birthday, Place, Day of the week, Month, Year   Memory:  short term memory grossly intact   Consciousness:  alert, awake   Attention: Good   Insight:  " Partial   Judgment: Partial   Gait/Station: Normal gait/station   Motor Activity: No abnormal movements     Vitals:    12/23/23 1515 12/23/23 2042 12/24/23 0732 12/24/23 0941   BP: 160/90 140/65 140/65    BP Location: Right arm Left arm Right arm    Pulse: 87 80 72    Resp: 16 16 16    Temp: 97.5 °F (36.4 °C) (!) 96.8 °F (36 °C) (!) 96.4 °F (35.8 °C)    TempSrc: Temporal Temporal Temporal    SpO2: 95% 90% 97%    Weight:    135 kg (296 lb 12.8 oz)   Height:           Admission on 12/01/2023   Component Date Value    Sodium 12/02/2023 139     Potassium 12/02/2023 3.8     Chloride 12/02/2023 107     CO2 12/02/2023 21     ANION GAP 12/02/2023 11     BUN 12/02/2023 13     Creatinine 12/02/2023 0.62     Glucose 12/02/2023 107     Glucose, Fasting 12/02/2023 107 (H)     Calcium 12/02/2023 8.7     AST 12/02/2023 13     ALT 12/02/2023 13     Alkaline Phosphatase 12/02/2023 55     Total Protein 12/02/2023 6.0 (L)     Albumin 12/02/2023 3.5     Total Bilirubin 12/02/2023 0.25     eGFR 12/02/2023 104     Magnesium 12/02/2023 2.3     Phosphorus 12/02/2023 3.4     WBC 12/02/2023 8.96     RBC 12/02/2023 4.34     Hemoglobin 12/02/2023 12.9     Hematocrit 12/02/2023 40.5     MCV 12/02/2023 93     MCH 12/02/2023 29.7     MCHC 12/02/2023 31.9     RDW 12/02/2023 14.2     MPV 12/02/2023 10.9     Platelets 12/02/2023 164     nRBC 12/02/2023 0     Neutrophils Relative 12/02/2023 65     Immat GRANS % 12/02/2023 1     Lymphocytes Relative 12/02/2023 25     Monocytes Relative 12/02/2023 8     Eosinophils Relative 12/02/2023 1     Basophils Relative 12/02/2023 0     Neutrophils Absolute 12/02/2023 5.77     Immature Grans Absolute 12/02/2023 0.10     Lymphocytes Absolute 12/02/2023 2.24     Monocytes Absolute 12/02/2023 0.75     Eosinophils Absolute 12/02/2023 0.06     Basophils Absolute 12/02/2023 0.04     TSH 3RD GENERATON 12/02/2023 4.324     Vitamin B-12 12/02/2023 235     Folate 12/02/2023 21.0     Vit D, 25-Hydroxy 12/02/2023 19.3 (L)      Ventricular Rate 12/01/2023 77     Atrial Rate 12/01/2023 77     MT Interval 12/01/2023 180     QRSD Interval 12/01/2023 90     QT Interval 12/01/2023 388     QTC Interval 12/01/2023 439     P Axis 12/01/2023 47     QRS [place] 12/01/2023 -12     T Wave [place] 12/01/2023 18     Protime 12/03/2023 26.0 (H)     INR 12/03/2023 2.35 (H)     Oxcarbazepine 12/05/2023 8 (L)     QFT Nil 12/05/2023 <0.00     QFT TB1-NIL 12/05/2023 0.06     QFT TB2-NIL 12/05/2023 0.05     QFT Mitogen-NIL 12/05/2023 10.00     QFT Final Interpretation 12/05/2023 Negative     Ventricular Rate 12/05/2023 72     Atrial Rate 12/05/2023 72     MT Interval 12/05/2023 194     QRSD Interval 12/05/2023 94     QT Interval 12/05/2023 374     QTC Interval 12/05/2023 409     P Axis 12/05/2023 50     QRS [place] 12/05/2023 -18     T Wave [place] 12/05/2023 8     Protime 12/06/2023 44.4 (H)     INR 12/06/2023 4.70 (H)     Protime 12/08/2023 37.1 (H)     INR 12/08/2023 3.72 (H)     Protime 12/09/2023 22.9 (H)     INR 12/09/2023 1.99 (H)     Oxcarbazepine 12/12/2023 14     Protime 12/12/2023 25.8 (H)     INR 12/12/2023 2.33 (H)     Protime 12/15/2023 36.2 (H)     INR 12/15/2023 3.61 (H)     Protime 12/17/2023 20.0 (H)     INR 12/17/2023 1.67 (H)     Protime 12/18/2023 17.1 (H)     INR 12/18/2023 1.36 (H)     Protime 12/19/2023 19.0 (H)     INR 12/19/2023 1.56 (H)     Protime 12/20/2023 22.9 (H)     INR 12/20/2023 1.99 (H)     Protime 12/22/2023 26.6 (H)     INR 12/22/2023 2.42 (H)        Progress Toward Goals:  Based on today's interview and review of prior notes, Pt has remained stable through the weekend.  Continue the present medication regimen  Continue Invega Sustenna 234mg IM injection started 12/17/2023, followed by a 156mg dose on 12/21/2023.  Next dose due next month.     Assessment/Plan   Principal Problem:    Schizoaffective disorder, bipolar type (HCC)  Active Problems:    Benign essential hypertension    Edema    Hypothyroidism    MAG (obstructive sleep  apnea)    Overactive bladder    Sjogren's syndrome (HCC)    Medical clearance for psychiatric admission    Anxiety    Borderline personality disorder (HCC)    History of pulmonary embolism    Ingrown toenail      Recommended Treatment: Continue with pharmacotherapy, group therapy, milieu therapy and occupational therapy.  The patient will be maintained on the following medications:  Current Facility-Administered Medications   Medication Dose Route Frequency Provider Last Rate    aluminum-magnesium hydroxide-simethicone  30 mL Oral Q4H PRN Wendy Anderson, JHONATAN      atoMOXetine  40 mg Oral Daily Anatoly Prajapati, DO      buPROPion  450 mg Oral Daily Anatoly Prajapati, DO      cloNIDine  0.1 mg Oral Q12H TASHA Anatoly Prajapati, DO      cyanocobalamin  1,000 mcg Oral Daily Dianna Mcrae, CRNP      cyanocobalamin  1,000 mcg Intramuscular Q30 Days Dianna Mcrae, LAURANP      ergocalciferol  50,000 Units Oral Weekly Dianna Mcrae, LAURANP      furosemide  20 mg Oral Daily Wendy Anderson, CRNP      hydrOXYzine HCL  25 mg Oral Q6H PRN Max 4/day Virginia Mccullough, CRNP      hydrOXYzine HCL  50 mg Oral Q6H PRN Max 4/day Virginia Mccullough, CRNP      ibuprofen  200 mg Oral Q6H PRN Virginia Mccullough, CRNP      ibuprofen  400 mg Oral Q6H PRN Virginia Mccullough, CRNP      ibuprofen  600 mg Oral Q8H PRN Virginia Mccullough, CRNP      levothyroxine  125 mcg Oral Early Morning Wendy Anderson, CRNP      loperamide  2 mg Oral Q4H PRN Anatoly Prajapati, DO      LORazepam  1 mg Intramuscular Q6H PRN Max 3/day Virginia Mccullough, CRNP      LORazepam  1 mg Oral Q6H PRN Max 3/day Vriginia Mccullough, CRNP      losartan  100 mg Oral Daily Wendy Anderson, CRNP      melatonin  3 mg Oral HS Frederick Smith MD      naltrexone  50 mg Oral Daily Anatoly Prajapati, DO      OLANZapine  10 mg Oral Q6H PRN Frederick Smith MD      Or    OLANZapine  10 mg Intramuscular Q6H PRN Frederick Smith MD      OLANZapine  5 mg Oral Q6H PRN  Frederick Smith MD      Or    OLANZapine  5 mg Intramuscular Q6H PRN Frederick Smith MD      OLANZapine  2.5 mg Oral Q3H PRN Frederick Smith MD      OXcarbazepine  450 mg Oral Q12H TASHA Frederick Smith MD      oxybutynin  5 mg Oral BID JHONATAN Fields      [START ON 1/15/2024] paliperidone  234 mg IM- Deltoid Q4 weeks Frederick Smith MD      pantoprazole  40 mg Oral Early Morning JHONATAN Fields      polyethylene glycol  17 g Oral Daily PRN JHONATAN Anne      saccharomyces boulardii  250 mg Oral BID Anatoly Prajapati,       senna-docusate sodium  1 tablet Oral Daily PRN JHONATAN Anne      topiramate  200 mg Oral BID Anatoly Prajapati,       traZODone  50 mg Oral HS PRN JHONATAN Anne      warfarin  7 mg Oral Daily (warfarin) JHONATAN Berg         Risks, benefits and possible side effects of Medications:   Risks, benefits, and possible side effects of medications explained to patient and patient verbalizes understanding  Pt has h/o hysterectomy

## 2023-12-24 NOTE — NURSING NOTE
PRN Atarax effective. Pt describes feeling less anxious. No tremors noted. Enjoyed her family visit. Currently bright and elevated. Denies SI/HI. Denies psychosis. Visible and social on the unit. Q7's maintained. Support provided.

## 2023-12-24 NOTE — NURSING NOTE
Patient denies anxiety, depression, SI/HI/AH/VH. Medication compliant. Cooperative with care. Patient is visible in her room resting throughout shift. Patient encouraged to make needs known.Safety checks ongoing.

## 2023-12-25 LAB
INR PPP: 3.46 (ref 0.84–1.19)
PROTHROMBIN TIME: 35.1 SECONDS (ref 11.6–14.5)

## 2023-12-25 PROCEDURE — 99232 SBSQ HOSP IP/OBS MODERATE 35: CPT | Performed by: PSYCHIATRY & NEUROLOGY

## 2023-12-25 PROCEDURE — 85610 PROTHROMBIN TIME: CPT | Performed by: NURSE PRACTITIONER

## 2023-12-25 RX ORDER — WARFARIN SODIUM 2 MG/1
1 TABLET ORAL
Status: COMPLETED | OUTPATIENT
Start: 2023-12-25 | End: 2023-12-25

## 2023-12-25 RX ADMIN — NALTREXONE HYDROCHLORIDE 50 MG: 50 TABLET, FILM COATED ORAL at 09:12

## 2023-12-25 RX ADMIN — Medication 250 MG: at 09:12

## 2023-12-25 RX ADMIN — LOSARTAN POTASSIUM 100 MG: 50 TABLET, FILM COATED ORAL at 09:12

## 2023-12-25 RX ADMIN — WARFARIN SODIUM 1 MG: 2 TABLET ORAL at 17:00

## 2023-12-25 RX ADMIN — MELATONIN TAB 3 MG 3 MG: 3 TAB at 21:47

## 2023-12-25 RX ADMIN — CLONIDINE HYDROCHLORIDE 0.1 MG: 0.1 TABLET ORAL at 21:46

## 2023-12-25 RX ADMIN — OXYBUTYNIN CHLORIDE 5 MG: 5 TABLET ORAL at 09:12

## 2023-12-25 RX ADMIN — ERGOCALCIFEROL 50000 UNITS: 1.25 CAPSULE ORAL at 09:12

## 2023-12-25 RX ADMIN — CYANOCOBALAMIN TAB 1000 MCG 1000 MCG: 1000 TAB at 09:12

## 2023-12-25 RX ADMIN — OXCARBAZEPINE 450 MG: 150 TABLET, FILM COATED ORAL at 21:46

## 2023-12-25 RX ADMIN — FUROSEMIDE 20 MG: 20 TABLET ORAL at 09:12

## 2023-12-25 RX ADMIN — OXYBUTYNIN CHLORIDE 5 MG: 5 TABLET ORAL at 17:01

## 2023-12-25 RX ADMIN — TOPIRAMATE 200 MG: 100 TABLET, FILM COATED ORAL at 17:00

## 2023-12-25 RX ADMIN — BUPROPION HYDROCHLORIDE 450 MG: 300 TABLET, EXTENDED RELEASE ORAL at 09:12

## 2023-12-25 RX ADMIN — CLONIDINE HYDROCHLORIDE 0.1 MG: 0.1 TABLET ORAL at 09:12

## 2023-12-25 RX ADMIN — LEVOTHYROXINE SODIUM 125 MCG: 125 TABLET ORAL at 06:23

## 2023-12-25 RX ADMIN — TOPIRAMATE 200 MG: 100 TABLET, FILM COATED ORAL at 09:12

## 2023-12-25 RX ADMIN — PANTOPRAZOLE SODIUM 40 MG: 40 TABLET, DELAYED RELEASE ORAL at 06:23

## 2023-12-25 RX ADMIN — OXCARBAZEPINE 450 MG: 150 TABLET, FILM COATED ORAL at 09:12

## 2023-12-25 RX ADMIN — Medication 250 MG: at 17:00

## 2023-12-25 RX ADMIN — ATOMOXETINE 40 MG: 40 CAPSULE ORAL at 09:12
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
## 2023-12-25 NOTE — NURSING NOTE
"PRN Trazodone was effective. Patient slept throughout the night. Patient was tearful this morning d/t a peer standing near her room talking about [place]. She stated \"It's [place] and I shouldn't have to hear that.\" Support provided.   "
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
## 2023-12-26 LAB
INR PPP: 3.48 (ref 0.84–1.19)
PROTHROMBIN TIME: 35.2 SECONDS (ref 11.6–14.5)

## 2023-12-26 PROCEDURE — 85610 PROTHROMBIN TIME: CPT | Performed by: NURSE PRACTITIONER

## 2023-12-26 PROCEDURE — 99232 SBSQ HOSP IP/OBS MODERATE 35: CPT

## 2023-12-26 RX ORDER — WARFARIN SODIUM 2 MG/1
1 TABLET ORAL
Status: COMPLETED | OUTPATIENT
Start: 2023-12-26 | End: 2023-12-26

## 2023-12-26 RX ADMIN — BUPROPION HYDROCHLORIDE 450 MG: 300 TABLET, EXTENDED RELEASE ORAL at 08:19

## 2023-12-26 RX ADMIN — OXCARBAZEPINE 450 MG: 150 TABLET, FILM COATED ORAL at 21:40

## 2023-12-26 RX ADMIN — TOPIRAMATE 200 MG: 100 TABLET, FILM COATED ORAL at 08:19

## 2023-12-26 RX ADMIN — OXYBUTYNIN CHLORIDE 5 MG: 5 TABLET ORAL at 08:32

## 2023-12-26 RX ADMIN — FUROSEMIDE 20 MG: 20 TABLET ORAL at 08:19

## 2023-12-26 RX ADMIN — ATOMOXETINE 40 MG: 40 CAPSULE ORAL at 08:19

## 2023-12-26 RX ADMIN — CLONIDINE HYDROCHLORIDE 0.1 MG: 0.1 TABLET ORAL at 21:40

## 2023-12-26 RX ADMIN — Medication 250 MG: at 17:09

## 2023-12-26 RX ADMIN — OXYBUTYNIN CHLORIDE 5 MG: 5 TABLET ORAL at 17:09

## 2023-12-26 RX ADMIN — CYANOCOBALAMIN TAB 1000 MCG 1000 MCG: 1000 TAB at 08:19

## 2023-12-26 RX ADMIN — MELATONIN TAB 3 MG 3 MG: 3 TAB at 21:40

## 2023-12-26 RX ADMIN — Medication 250 MG: at 08:19

## 2023-12-26 RX ADMIN — CLONIDINE HYDROCHLORIDE 0.1 MG: 0.1 TABLET ORAL at 08:19

## 2023-12-26 RX ADMIN — TOPIRAMATE 200 MG: 100 TABLET, FILM COATED ORAL at 17:09

## 2023-12-26 RX ADMIN — WARFARIN SODIUM 1 MG: 2 TABLET ORAL at 17:09

## 2023-12-26 RX ADMIN — NALTREXONE HYDROCHLORIDE 50 MG: 50 TABLET, FILM COATED ORAL at 08:19

## 2023-12-26 RX ADMIN — PANTOPRAZOLE SODIUM 40 MG: 40 TABLET, DELAYED RELEASE ORAL at 05:53

## 2023-12-26 RX ADMIN — LEVOTHYROXINE SODIUM 125 MCG: 125 TABLET ORAL at 05:53

## 2023-12-26 RX ADMIN — OXCARBAZEPINE 450 MG: 150 TABLET, FILM COATED ORAL at 08:19

## 2023-12-26 RX ADMIN — LOSARTAN POTASSIUM 100 MG: 50 TABLET, FILM COATED ORAL at 08:19
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
## 2023-12-26 NOTE — NURSING NOTE
Pt social and visible in the milieu. She reports depression and anxiety, but is bright with peers. Med/meal compliant. No agitation or aggressive behaviors noted thus far. Pt currently denying any unmet needs.
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
## 2023-12-26 NOTE — PLAN OF CARE
Problem: Risk for Self Injury/Neglect  Goal: Treatment Goal: Remain safe during length of stay, learn and adopt new coping skills, and be free of self-injurious ideation, impulses and acts at the time of discharge  Outcome: Progressing  Goal: Verbalize thoughts and feelings  Description: Interventions:  - Assess and re-assess patient's lethality and potential for self-injury  - Engage patient in 1:1 interactions, daily, for a minimum of 15 minutes  - Encourage patient to express feelings, fears, frustrations, hopes  - Establish rapport/trust with patient   Outcome: Progressing  Goal: Refrain from harming self  Description: Interventions:  - Monitor patient closely, per order  - Develop a trusting relationship  - Supervise medication ingestion, monitor effects and side effects   Outcome: Progressing  Goal: Attend and participate in unit activities, including therapeutic, recreational, and educational groups  Description: Interventions:  - Provide therapeutic and educational activities daily, encourage attendance and participation, and document same in the medical record  - Obtain collateral information, encourage visitation and family involvement in care   Outcome: Progressing  Goal: Recognize maladaptive responses and adopt new coping mechanisms  Outcome: Progressing  Goal: Complete daily ADLs, including personal hygiene independently, as able  Description: Interventions:  - Observe, teach, and assist patient with ADLS  - Monitor and promote a balance of rest/activity, with adequate nutrition and elimination  Outcome: Progressing     Problem: Depression  Goal: Treatment Goal: Demonstrate behavioral control of depressive symptoms, verbalize feelings of improved mood/affect, and adopt new coping skills prior to discharge  Outcome: Progressing  Goal: Verbalize thoughts and feelings  Description: Interventions:  - Assess and re-assess patient's level of risk   - Engage patient in 1:1 interactions, daily, for a minimum  of 15 minutes   - Encourage patient to express feelings, fears, frustrations, hopes   Outcome: Progressing  Goal: Refrain from harming self  Description: Interventions:  - Monitor patient closely, per order   - Supervise medication ingestion, monitor effects and side effects   Outcome: Progressing  Goal: Refrain from isolation  Description: Interventions:  - Develop a trusting relationship   - Encourage socialization   Outcome: Progressing  Goal: Refrain from self-neglect  Outcome: Progressing  Goal: Attend and participate in unit activities, including therapeutic, recreational, and educational groups  Description: Interventions:  - Provide therapeutic and educational activities daily, encourage attendance and participation, and document same in the medical record   Outcome: Progressing  Goal: Complete daily ADLs, including personal hygiene independently, as able  Description: Interventions:  - Observe, teach, and assist patient with ADLS  -  Monitor and promote a balance of rest/activity, with adequate nutrition and elimination   Outcome: Progressing     Problem: Anxiety  Goal: Anxiety is at manageable level  Description: Interventions:  - Assess and monitor patient's anxiety level.   - Monitor for signs and symptoms (heart palpitations, chest pain, shortness of breath, headaches, nausea, feeling jumpy, restlessness, irritable, apprehensive).   - Collaborate with interdisciplinary team and initiate plan and interventions as ordered.  - [place] patient to unit/surroundings  - Explain treatment plan  - Encourage participation in care  - Encourage verbalization of concerns/fears  - Identify coping mechanisms  - Assist in developing anxiety-reducing skills  - Administer/offer alternative therapies  - Limit or eliminate stimulants  Outcome: Progressing     Problem: Risk for Violence/Aggression Toward Others  Goal: Treatment Goal: Refrain from acts of violence/aggression during length of stay, and demonstrate improved  impulse control at the time of discharge  Outcome: Progressing  Goal: Verbalize thoughts and feelings  Description: Interventions:  - Assess and re-assess patient's level of risk, every waking shift  - Engage patient in 1:1 interactions, daily, for a minimum of 15 minutes   - Allow patient to express feelings and frustrations in a safe and non-threatening manner   - Establish rapport/trust with patient   Outcome: Progressing  Goal: Refrain from harming others  Outcome: Progressing  Goal: Refrain from destructive acts on the environment or property  Outcome: Progressing  Goal: Control angry outbursts  Description: Interventions:  - Monitor patient closely, per order  - Ensure early verbal de-escalation  - Monitor prn medication needs  - Set reasonable/therapeutic limits, outline behavioral expectations, and consequences   - Provide a non-threatening milieu, utilizing the least restrictive interventions   Outcome: Progressing  Goal: Attend and participate in unit activities, including therapeutic, recreational, and educational groups  Description: Interventions:  - Provide therapeutic and educational activities daily, encourage attendance and participation, and document same in the medical record   Outcome: Progressing  Goal: Identify appropriate positive anger management techniques  Description: Interventions:  - Offer anger management and coping skills groups   - Staff will provide positive feedback for appropriate anger control  Outcome: Progressing     Problem: DISCHARGE PLANNING - CARE MANAGEMENT  Goal: Discharge to post-acute care or home with appropriate resources  Description: INTERVENTIONS:  - Conduct assessment to determine patient/family and health care team treatment goals, and need for post-acute services based on payer coverage, community resources, and patient preferences, and barriers to discharge  - Address psychosocial, clinical, and financial barriers to discharge as identified in assessment in  conjunction with the patient/family and health care team  - Arrange appropriate level of post-acute services according to patient’s   needs and preference and payer coverage in collaboration with the physician and health care team  - Communicate with and update the patient/family, physician, and health care team regarding progress on the discharge plan  - Arrange appropriate transportation to post-acute venues  Outcome: Progressing     Problem: SAFETY, RESTRAINT - VIOLENT/SELF-DESTRUCTIVE  Goal: Remains free of harm/injury from restraints (Restraint for Violent/Self-Destructive Behavior)  Description: INTERVENTIONS:  - Instruct patient/family regarding restraint use   - Assess and monitor physiologic and psychological status   - Provide interventions and comfort measures to meet assessed patient needs   - Ensure continuous in person monitoring is provided   - Identify and implement measures to help patient regain control  - Assess readiness for release of restraint  Outcome: Progressing  Goal: Returns to optimal restraint-free functioning  Description: INTERVENTIONS:  - Assess the patient's behavior and symptoms that indicate continued need for restraint  - Identify and implement measures to help patient regain control  - Assess readiness for release of restraint   Outcome: Progressing

## 2023-12-26 NOTE — CASE MANAGEMENT
Blanca asked to speak to this writer today because she had a complaint against a tech. She said that a tech was treating her different and being mean regarding her meal trays. Encouraged her to fill out a comment card regarding this.

## 2023-12-27 ENCOUNTER — PATIENT OUTREACH (OUTPATIENT)
Dept: CASE MANAGEMENT | Facility: HOSPITAL | Age: 53
End: 2023-12-27

## 2023-12-27 LAB
INR PPP: 2.26 (ref 0.84–1.19)
PROTHROMBIN TIME: 25.3 SECONDS (ref 11.6–14.5)

## 2023-12-27 PROCEDURE — 99232 SBSQ HOSP IP/OBS MODERATE 35: CPT | Performed by: STUDENT IN AN ORGANIZED HEALTH CARE EDUCATION/TRAINING PROGRAM

## 2023-12-27 PROCEDURE — 85610 PROTHROMBIN TIME: CPT | Performed by: NURSE PRACTITIONER

## 2023-12-27 RX ORDER — WARFARIN SODIUM 2 MG/1
6 TABLET ORAL
Status: COMPLETED | OUTPATIENT
Start: 2023-12-27 | End: 2023-12-29

## 2023-12-27 RX ADMIN — LEVOTHYROXINE SODIUM 125 MCG: 125 TABLET ORAL at 06:17

## 2023-12-27 RX ADMIN — BUPROPION HYDROCHLORIDE 450 MG: 300 TABLET, EXTENDED RELEASE ORAL at 08:07

## 2023-12-27 RX ADMIN — PANTOPRAZOLE SODIUM 40 MG: 40 TABLET, DELAYED RELEASE ORAL at 06:17

## 2023-12-27 RX ADMIN — FUROSEMIDE 20 MG: 20 TABLET ORAL at 08:08

## 2023-12-27 RX ADMIN — Medication 250 MG: at 08:09

## 2023-12-27 RX ADMIN — OXCARBAZEPINE 450 MG: 150 TABLET, FILM COATED ORAL at 22:00

## 2023-12-27 RX ADMIN — Medication 250 MG: at 17:08

## 2023-12-27 RX ADMIN — OXYBUTYNIN CHLORIDE 5 MG: 5 TABLET ORAL at 17:08

## 2023-12-27 RX ADMIN — OXYBUTYNIN CHLORIDE 5 MG: 5 TABLET ORAL at 09:33

## 2023-12-27 RX ADMIN — TOPIRAMATE 200 MG: 100 TABLET, FILM COATED ORAL at 08:09

## 2023-12-27 RX ADMIN — LOSARTAN POTASSIUM 100 MG: 50 TABLET, FILM COATED ORAL at 08:08

## 2023-12-27 RX ADMIN — CYANOCOBALAMIN TAB 1000 MCG 1000 MCG: 1000 TAB at 08:08

## 2023-12-27 RX ADMIN — TRAZODONE HYDROCHLORIDE 50 MG: 50 TABLET ORAL at 22:48

## 2023-12-27 RX ADMIN — WARFARIN SODIUM 6 MG: 2 TABLET ORAL at 17:08

## 2023-12-27 RX ADMIN — MELATONIN TAB 3 MG 3 MG: 3 TAB at 22:00

## 2023-12-27 RX ADMIN — NALTREXONE HYDROCHLORIDE 50 MG: 50 TABLET, FILM COATED ORAL at 08:08

## 2023-12-27 RX ADMIN — ATOMOXETINE 40 MG: 40 CAPSULE ORAL at 08:09

## 2023-12-27 RX ADMIN — TOPIRAMATE 200 MG: 100 TABLET, FILM COATED ORAL at 17:08

## 2023-12-27 RX ADMIN — OXCARBAZEPINE 450 MG: 150 TABLET, FILM COATED ORAL at 08:08

## 2023-12-27 RX ADMIN — CLONIDINE HYDROCHLORIDE 0.1 MG: 0.1 TABLET ORAL at 08:08

## 2023-12-27 RX ADMIN — CLONIDINE HYDROCHLORIDE 0.1 MG: 0.1 TABLET ORAL at 22:00

## 2023-12-27 NOTE — TREATMENT TEAM
Pt attended all groups.   Pt optimistic stating she would like to be d.c and utilize her coping skills.  Pt hopes to speak with doctor upon his return.  Pt is also aware of potential referral to EAC.         12/27/23 1330   Activity/Group Checklist   Group Self Esteem   Attendance Attended   Attendance Duration (min) 31-45   Interactions Interacted appropriately   Affect/Mood Calm   Goals Achieved Identified feelings;Discussed coping strategies;Able to self-disclose;Able to recieve feedback;Displayed empathy;Increased hopefulness;Discussed self-esteem issues

## 2023-12-27 NOTE — PROGRESS NOTES
12/27/23 1100   Activity/Group Checklist   Group Community meeting  (Walter Villatoro New Day and discussion on new year.)   Attendance Attended   Attendance Duration (min) 31-45   Interactions Interacted appropriately  (Initial facial grimacing then join group discussion on community supports and shared that she has a  community support team.)   Affect/Mood Calm   Goals Achieved Able to engage in interactions;Able to listen to others;Identified resources and support systems

## 2023-12-27 NOTE — PROGRESS NOTES
"Progress Note - Behavioral Health   Blanca Mason 53 y.o. female MRN: [id number]  Unit/Bed#: [facility] 651-02 Encounter: [id number]    Patient was seen today for continuation of care, records reviewed and patient was discussed with the morning case review team.    Blanca was seen today for psychiatric follow-up.  On assessment today, Blanca was tearful and withdrawn. Seen in her room, patient states that she is feeling \"down\" today and that she thinks certain staff are against her. Reassurance provided, patient states that she continues to use coping mechanisms to manage frustration and overall sadness. Tolerating all medication changes well, no changes in sleep or appetite reported. Impulse control much improved, no agitation or aggression reported by staff or peers. Discharge disposition ongoing as patient will need EAC referral due to elevated suicidal risk in the community with frequent admissions with thoughts of self-harm.     Blanca denies acute suicidal/self-harm ideation/intent/plan upon direct inquiry at this time.  Blanca remains behaviorally appropriate, no agitation or aggression noted on exam or in report.  Blanca also denies HI/AH/VH, and does not appear overtly manic.  No overt delusions or paranoia are verbalized.   Blanca remains adherent to her current psychotropic medication regimen and denies any side effects from medications, as well as none noted on exam.    Vitals:  Vitals:    12/27/23 0754   BP: 146/93   Pulse: 82   Resp: 18   Temp: (!) 97 °F (36.1 °C)   SpO2: 91%       Laboratory Results:  I have personally reviewed all pertinent laboratory/tests results.  Most Recent Labs:   Lab Results   Component Value Date    WBC 8.96 12/02/2023    RBC 4.34 12/02/2023    HGB 12.9 12/02/2023    HCT 40.5 12/02/2023     12/02/2023    RDW 14.2 12/02/2023    TOTANEUTABS 4.73 11/19/2023    NEUTROABS 5.77 12/02/2023    SODIUM 139 12/02/2023    K 3.8 12/02/2023     12/02/2023    CO2 21 12/02/2023    BUN 13 " 12/02/2023    CREATININE 0.62 12/02/2023    GLUC 107 12/02/2023    GLUF 107 (H) 12/02/2023    CALCIUM 8.7 12/02/2023    AST 13 12/02/2023    ALT 13 12/02/2023    ALKPHOS 55 12/02/2023    TP 6.0 (L) 12/02/2023    ALB 3.5 12/02/2023    TBILI 0.25 12/02/2023    CHOLESTEROL 234 (H) 03/09/2023    HDL 71 03/09/2023    TRIG 153 (H) 03/09/2023    LDLCALC 132 (H) 03/09/2023    NONHDLC 163 03/09/2023    VALPROICTOT 23 (L) 11/10/2023    AMMONIA 63 11/06/2023    LFQ3PDWRFYJI 4.324 12/02/2023    FREET4 0.81 11/06/2023    T3FREE 2.27 (L) 10/16/2019    PREGUR negative 07/13/2022    PREGSERUM Negative 11/06/2023    RPR Non-Reactive 07/17/2022    HGBA1C 5.0 01/05/2023    EAG 97 01/05/2023       Psychiatric Review of Systems:  Behavior over the last 24 hours:  unchanged.   Sleep: good  Appetite: good  Medication side effects: none  ROS: no complaints, denies shortness of breath or chest pain and all other systems are negative for acute changes    Mental Status Evaluation:  Appearance:  dressed appropriately, adequate grooming   Behavior:  cooperative   Speech:  normal rate and volume   Mood:  still at times irritable   Affect:  constricted   Thought Process:  linear   Thought Content:  no overt delusions   Perceptual Disturbances: denies auditory hallucinations when asked, does not appear responding to internal stimuli   Risk Potential: Suicidal ideation - None  Homicidal ideation - None  Potential for aggression - No   Memory:  recent and remote memory grossly intact   Sensorium  person, place, and time/date      Consciousness:  alert and awake   Attention: attention span and concentration are age appropriate   Insight:  partial   Judgment: partial   Gait/Station: normal gait/station   Motor Activity: no abnormal movements   Progress Toward Goals:   Blanca is progressing towards goals of inpatient psychiatric treatment by continued medication compliance and is attending therapeutic modalities on the milieu. However, the patient  continues to require inpatient psychiatric hospitalization for continued medication management and titration to optimize symptom reduction, improve sleep hygiene, and demonstrate adequate self-care.    Assessment/Plan   Principal Problem:    Schizoaffective disorder, bipolar type (HCC)  Active Problems:    Benign essential hypertension    Edema    Hypothyroidism    MAG (obstructive sleep apnea)    Overactive bladder    Sjogren's syndrome (HCC)    Medical clearance for psychiatric admission    Anxiety    Borderline personality disorder (HCC)    History of pulmonary embolism    Ingrown toenail      Recommended Treatment: Treatment plan and medication changes discussed and per the attending physician the plan is:    1.Continue with group therapy, milieu therapy and occupational therapy  2.Behavioral Health checks every 7 minutes  3.Continue frequent safety checks and vitals per unit protocol  4.Continue with [facility] medical management as indicated  5.Continue with current medication regimen  6.Will review labs in the a.m.  7.Disposition Planning: Discharge planning and efforts remain ongoing    Behavioral Health Medications: all current active meds have been reviewed and continue current psychiatric medications.  Current Facility-Administered Medications   Medication Dose Route Frequency Provider Last Rate    aluminum-magnesium hydroxide-simethicone  30 mL Oral Q4H PRN JHONATAN Fields      atoMOXetine  40 mg Oral Daily Anatoly Prajapati, DO      buPROPion  450 mg Oral Daily Anatoly Prajapati, DO      cloNIDine  0.1 mg Oral Q12H [facility] Anatoly Prajapati,       cyanocobalamin  1,000 mcg Oral Daily JHONATAN Berg      cyanocobalamin  1,000 mcg Intramuscular Q30 Days JHONATAN Berg      ergocalciferol  50,000 Units Oral Weekly JHONATAN Berg      furosemide  20 mg Oral Daily JHONATAN Fields      hydrOXYzine HCL  25 mg Oral Q6H PRN Max 4/day JHONATAN Anne       hydrOXYzine HCL  50 mg Oral Q6H PRN Max 4/day Virginia Mccullough, JHONATAN      ibuprofen  200 mg Oral Q6H PRN Virginia Mccullough, CRNP      ibuprofen  400 mg Oral Q6H PRN Virginia Mccullough, CRNP      ibuprofen  600 mg Oral Q8H PRN Virginia Mccullough, CRELKIN      levothyroxine  125 mcg Oral Early Morning Wendy Anderson, JHONATAN      loperamide  2 mg Oral Q4H PRN Anatoly Prajapati, DO      LORazepam  1 mg Intramuscular Q6H PRN Max 3/day Virginia Mccullough, JHONATAN      LORazepam  1 mg Oral Q6H PRN Max 3/day Virginia Mccullough, JHONATAN      losartan  100 mg Oral Daily Wendy Anderson, JHONATAN      melatonin  3 mg Oral HS Frederick Smith MD      naltrexone  50 mg Oral Daily Anatoly Prajapati, DO      OLANZapine  10 mg Oral Q6H PRN Frederick Smith MD      Or    OLANZapine  10 mg Intramuscular Q6H PRN Frederick Smith MD      OLANZapine  5 mg Oral Q6H PRN Frederick Smith MD      Or    OLANZapine  5 mg Intramuscular Q6H PRN Frederick Smith MD      OLANZapine  2.5 mg Oral Q3H PRN Frederick Smith MD      OXcarbazepine  450 mg Oral Q12H TASHA Frederick Smith MD      oxybutynin  5 mg Oral BID JHONATAN Fields      [START ON 1/15/2024] paliperidone  234 mg IM- Deltoid Q4 weeks Frederick Smith MD      pantoprazole  40 mg Oral Early Morning JHONATAN Fields      polyethylene glycol  17 g Oral Daily PRN Virginia Mccullough, JHONATAN      saccharomyces boulardii  250 mg Oral BID Anatoly Prajapati, DO      senna-docusate sodium  1 tablet Oral Daily PRN Virginia Mccullough, JHONATAN      topiramate  200 mg Oral BID Anatoly Prajapati, DO      traZODone  50 mg Oral HS PRN JHONATAN Anne      warfarin  6 mg Oral Daily (warfarin) JHONATAN Berg         Risks / Benefits of Treatment:  Risks, benefits, and possible side effects of medications explained to patient and patient verbalizes understanding and agreement for treatment.    Counseling / Coordination of Care:  Patient's progress reviewed with nursing staff.  Medications, treatment  progress and treatment plan reviewed with patient.  Supportive counseling provided to the patient.    Total floor/unit time spent today 25 minutes. Greater than 50% of total time was spent with the patient and / or family counseling and / or coordination of care. A description of the counseling / coordination of care: medication education, treatment plan, supportive therapy.    This note was completed in part utilizing Dragon dictation Software. Grammatical, translation, syntax errors, random word insertions, spelling mistakes, and incomplete sentences may be an occasional consequence of this system secondary to software limitations with voice recognition, ambient noise, and hardware issues. If you have any questions or concerns about the content, text, or information contained within the body of this dictation, please contact the provider for clarification

## 2023-12-27 NOTE — PROGRESS NOTES
RAIN CM reviewed chart. Pt currently admitted to [facility]. Plan for pt to go to [facility] after d/c. Will continue to remain available for any OP SW needs. Pt will continue to be followed in surveillance.

## 2023-12-27 NOTE — PROGRESS NOTES
12/27/23 0811   Team Meeting   Meeting Type Daily Rounds   Initial Conference Date 12/27/23   Next Conference Date 12/28/23   Team Members Present   Team Members Present Physician;Nurse;;;Occupational Therapist   Physician Team Member Dr Quispe, SHEFALI Camargo   Nursing Team Member Aram   Care Management Team Member Atiya   Social Work Team Member Dalia   OT Team Member Yeison   Patient/Family Present   Patient Present No   Patient's Family Present No     Upset with staff, possible EAC referral to go in next week, improving and showing restraint in situations where she would have acted out before. Discharge pending.

## 2023-12-27 NOTE — PLAN OF CARE
Problem: Ineffective Coping  Goal: Identifies ineffective coping skills  Outcome: Progressing  Goal: Identifies healthy coping skills  Outcome: Progressing  Goal: Demonstrates healthy coping skills  Outcome: Progressing  Goal: Participates in unit activities  Description: Interventions:  - Provide therapeutic environment   - Provide required programming   - Redirect inappropriate behaviors   Outcome: Progressing  Goal: Patient/Family participate in treatment and DC plans  Description: Interventions:  - Provide therapeutic environment  Outcome: Progressing  Goal: Patient/Family verbalizes awareness of resources  Outcome: Progressing  Goal: Understands least restrictive measures  Description: Interventions:  - Utilize least restrictive behavior  Outcome: Progressing  Goal: Free from restraint events  Description: - Utilize least restrictive measures   - Provide behavioral interventions   - Redirect inappropriate behaviors   Outcome: Progressing     Problem: Risk for Self Injury/Neglect  Goal: Treatment Goal: Remain safe during length of stay, learn and adopt new coping skills, and be free of self-injurious ideation, impulses and acts at the time of discharge  Outcome: Progressing  Goal: Verbalize thoughts and feelings  Description: Interventions:  - Assess and re-assess patient's lethality and potential for self-injury  - Engage patient in 1:1 interactions, daily, for a minimum of 15 minutes  - Encourage patient to express feelings, fears, frustrations, hopes  - Establish rapport/trust with patient   Outcome: Progressing  Goal: Refrain from harming self  Description: Interventions:  - Monitor patient closely, per order  - Develop a trusting relationship  - Supervise medication ingestion, monitor effects and side effects   Outcome: Progressing  Goal: Attend and participate in unit activities, including therapeutic, recreational, and educational groups  Description: Interventions:  - Provide therapeutic and  educational activities daily, encourage attendance and participation, and document same in the medical record  - Obtain collateral information, encourage visitation and family involvement in care   Outcome: Progressing  Goal: Recognize maladaptive responses and adopt new coping mechanisms  Outcome: Progressing  Goal: Complete daily ADLs, including personal hygiene independently, as able  Description: Interventions:  - Observe, teach, and assist patient with ADLS  - Monitor and promote a balance of rest/activity, with adequate nutrition and elimination  Outcome: Progressing

## 2023-12-27 NOTE — PLAN OF CARE
Problem: Risk for Self Injury/Neglect  Goal: Attend and participate in unit activities, including therapeutic, recreational, and educational groups  Description: Interventions:  - Provide therapeutic and educational activities daily, encourage attendance and participation, and document same in the medical record  - Obtain collateral information, encourage visitation and family involvement in care   Outcome: Progressing

## 2023-12-27 NOTE — NURSING NOTE
Patient is pleasant and cooperative , with poor concentration. Her behaviors and attitude is pleasant with good eye contact. Patient was found crying in her room stating she is not treated well by certain staff members. Patient was educated about focusing on positive things  and patient was Patient has bizarre appearance with worried affect. Good medication compliance. Impulsive behaviors not  observed. Reported depression 4/10 and Anxiety 3/4. Patient was hoping to play her guitar  Antony Si,HI. A,V/H.

## 2023-12-27 NOTE — NURSING NOTE
The patient was isolative to room most the evening. She was calm and cooperative. Patient denies endorse some anxiety and depression due to missing home. She denies SI/HI/AVH. Patient was med complaint. She continues to use her BPAP at . Safety checks ongoing.

## 2023-12-28 PROCEDURE — 99232 SBSQ HOSP IP/OBS MODERATE 35: CPT | Performed by: STUDENT IN AN ORGANIZED HEALTH CARE EDUCATION/TRAINING PROGRAM

## 2023-12-28 RX ORDER — WATER 10 ML/10ML
INJECTION INTRAMUSCULAR; INTRAVENOUS; SUBCUTANEOUS
Status: COMPLETED
Start: 2023-12-28 | End: 2023-12-28

## 2023-12-28 RX ADMIN — ATOMOXETINE 40 MG: 40 CAPSULE ORAL at 10:00

## 2023-12-28 RX ADMIN — CLONIDINE HYDROCHLORIDE 0.1 MG: 0.1 TABLET ORAL at 10:00

## 2023-12-28 RX ADMIN — LOSARTAN POTASSIUM 100 MG: 50 TABLET, FILM COATED ORAL at 10:00

## 2023-12-28 RX ADMIN — PANTOPRAZOLE SODIUM 40 MG: 40 TABLET, DELAYED RELEASE ORAL at 05:53

## 2023-12-28 RX ADMIN — BUPROPION HYDROCHLORIDE 450 MG: 300 TABLET, EXTENDED RELEASE ORAL at 10:00

## 2023-12-28 RX ADMIN — OXCARBAZEPINE 450 MG: 150 TABLET, FILM COATED ORAL at 21:14

## 2023-12-28 RX ADMIN — FUROSEMIDE 20 MG: 20 TABLET ORAL at 10:00

## 2023-12-28 RX ADMIN — OXCARBAZEPINE 450 MG: 150 TABLET, FILM COATED ORAL at 10:00

## 2023-12-28 RX ADMIN — LEVOTHYROXINE SODIUM 125 MCG: 125 TABLET ORAL at 05:53

## 2023-12-28 RX ADMIN — TOPIRAMATE 200 MG: 100 TABLET, FILM COATED ORAL at 17:09

## 2023-12-28 RX ADMIN — MELATONIN TAB 3 MG 3 MG: 3 TAB at 21:14

## 2023-12-28 RX ADMIN — CLONIDINE HYDROCHLORIDE 0.1 MG: 0.1 TABLET ORAL at 21:14

## 2023-12-28 RX ADMIN — OLANZAPINE 10 MG: 10 INJECTION, POWDER, LYOPHILIZED, FOR SOLUTION INTRAMUSCULAR at 18:09

## 2023-12-28 RX ADMIN — Medication 250 MG: at 17:09

## 2023-12-28 RX ADMIN — TOPIRAMATE 200 MG: 100 TABLET, FILM COATED ORAL at 10:00

## 2023-12-28 RX ADMIN — OXYBUTYNIN CHLORIDE 5 MG: 5 TABLET ORAL at 17:09

## 2023-12-28 RX ADMIN — WATER 2.1 ML: 1 INJECTION INTRAMUSCULAR; INTRAVENOUS; SUBCUTANEOUS at 18:09

## 2023-12-28 RX ADMIN — NALTREXONE HYDROCHLORIDE 50 MG: 50 TABLET, FILM COATED ORAL at 10:00

## 2023-12-28 RX ADMIN — WARFARIN SODIUM 6 MG: 2 TABLET ORAL at 17:09

## 2023-12-28 RX ADMIN — Medication 250 MG: at 10:00

## 2023-12-28 RX ADMIN — OXYBUTYNIN CHLORIDE 5 MG: 5 TABLET ORAL at 10:00

## 2023-12-28 RX ADMIN — CYANOCOBALAMIN TAB 1000 MCG 1000 MCG: 1000 TAB at 10:00

## 2023-12-28 NOTE — PROGRESS NOTES
"Progress Note - Behavioral Health   Blanca Mason 53 y.o. female MRN: [id number]  Unit/Bed#: [facility] 651-02 Encounter: [id number]    Patient was seen today for continuation of care, records reviewed and patient was discussed with the morning case review team.    Blanca was seen today for psychiatric follow-up.  On assessment today, Blanca was noted to be guarded and tearful during interview.  Repeated prompting needed for patient to participate, patient is currently preoccupied with staff member and states that \" Im tired of being treated like trash.\"  Education provided about rules of the unit, patient is also reassured that for treatment is always given.  Able to attend group later on in the day, patient continues to use coping mechanisms to deal with increased frustration.  No medication changes to be made at this time, SNOWDEN Invega Sustenna 234 mg to be given on 1/14/2024, patient tolerating all other medication changes well.  Discharge disposition ongoing as patient will need referral to [facility] due to increased suicidality in the community.    Blanca denies acute suicidal/self-harm ideation/intent/plan upon direct inquiry at this time.  Blanca remains behaviorally appropriate, no agitation or aggression noted on exam or in report.  Blanca also denies HI/AH/VH, and does not appear overtly manic.  No overt delusions or paranoia are verbalized. Impulse control is intact.  Blanca remains adherent to her current psychotropic medication regimen and denies any side effects from medications, as well as none noted on exam.    Vitals:  Vitals:    12/28/23 0959   BP: 170/85   Pulse: 80   Resp: 16   Temp: (!) 97.4 °F (36.3 °C)   SpO2: 95%       Laboratory Results:  I have personally reviewed all pertinent laboratory/tests results.  Most Recent Labs:   Lab Results   Component Value Date    WBC 8.96 12/02/2023    RBC 4.34 12/02/2023    HGB 12.9 12/02/2023    HCT 40.5 12/02/2023     12/02/2023    RDW 14.2 12/02/2023    TOTANEUTABS 4.73 " 11/19/2023    NEUTROABS 5.77 12/02/2023    SODIUM 139 12/02/2023    K 3.8 12/02/2023     12/02/2023    CO2 21 12/02/2023    BUN 13 12/02/2023    CREATININE 0.62 12/02/2023    GLUC 107 12/02/2023    GLUF 107 (H) 12/02/2023    CALCIUM 8.7 12/02/2023    AST 13 12/02/2023    ALT 13 12/02/2023    ALKPHOS 55 12/02/2023    TP 6.0 (L) 12/02/2023    ALB 3.5 12/02/2023    TBILI 0.25 12/02/2023    CHOLESTEROL 234 (H) 03/09/2023    HDL 71 03/09/2023    TRIG 153 (H) 03/09/2023    LDLCALC 132 (H) 03/09/2023    NONHDLC 163 03/09/2023    VALPROICTOT 23 (L) 11/10/2023    AMMONIA 63 11/06/2023    MYO5VHEWQALS 4.324 12/02/2023    FREET4 0.81 11/06/2023    T3FREE 2.27 (L) 10/16/2019    PREGUR negative 07/13/2022    PREGSERUM Negative 11/06/2023    RPR Non-Reactive 07/17/2022    HGBA1C 5.0 01/05/2023    EAG 97 01/05/2023       Psychiatric Review of Systems:  Behavior over the last 24 hours:  unchanged.   Sleep: good  Appetite:good   Medication side effects: better  ROS: no complaints, denies shortness of breath or chest pain and all other systems are negative for acute changes    Mental Status Evaluation:  Appearance:  dressed in hospital attire   Behavior:  demanding, guarded   Speech:  normal rate and volume   Mood:  labile, irritable   Affect:  constricted, tearful   Thought Process:  linear   Thought Content:  no overt delusions   Perceptual Disturbances: denies auditory hallucinations when asked, does not appear responding to internal stimuli   Risk Potential: Suicidal ideation - None  Homicidal ideation - None  Potential for aggression - No   Memory:  recent and remote memory grossly intact   Sensorium  person, place, and time/date      Consciousness:  alert and awake   Attention: attention span and concentration are age appropriate   Insight:  partial   Judgment: partial   Gait/Station: normal gait/station   Motor Activity: no abnormal movements   Progress Toward Goals:   Blanca is progressing towards goals of inpatient  psychiatric treatment by continued medication compliance and is attending therapeutic modalities on the milieu. However, the patient continues to require inpatient psychiatric hospitalization for continued medication management and titration to optimize symptom reduction, improve sleep hygiene, and demonstrate adequate self-care.    Assessment/Plan   Principal Problem:    Schizoaffective disorder, bipolar type (HCC)  Active Problems:    Benign essential hypertension    Edema    Hypothyroidism    MAG (obstructive sleep apnea)    Overactive bladder    Sjogren's syndrome (HCC)    Medical clearance for psychiatric admission    Anxiety    Borderline personality disorder ([facility])    History of pulmonary embolism    Ingrown toenail      Recommended Treatment: Treatment plan and medication changes discussed and per the attending physician the plan is:    1.Continue with group therapy, milieu therapy and occupational therapy  2.Behavioral Health checks every 7 minutes  3.Continue frequent safety checks and vitals per unit protocol  4.Continue with SLIM medical management as indicated  5.Continue with current medication regimen  6.Will review labs in the a.m.  7.Disposition Planning: Discharge planning and efforts remain ongoing    Behavioral Health Medications: all current active meds have been reviewed and continue current psychiatric medications.  Current Facility-Administered Medications   Medication Dose Route Frequency Provider Last Rate    aluminum-magnesium hydroxide-simethicone  30 mL Oral Q4H PRN JHONATAN Fields      atoMOXetine  40 mg Oral Daily Anatoly Prajapati,       buPROPion  450 mg Oral Daily Anatoly Prajapati, DO      cloNIDine  0.1 mg Oral Q12H [facility] Anatoly Prajapati, DO      cyanocobalamin  1,000 mcg Oral Daily JHONATAN Berg      cyanocobalamin  1,000 mcg Intramuscular Q30 Days JHONATAN Berg      ergocalciferol  50,000 Units Oral Weekly JHONATAN Berg       furosemide  20 mg Oral Daily Wendy Anderson, CRNP      hydrOXYzine HCL  25 mg Oral Q6H PRN Max 4/day Virginia Mccullough, CRNP      hydrOXYzine HCL  50 mg Oral Q6H PRN Max 4/day Virginia Mccullough, CRNP      ibuprofen  200 mg Oral Q6H PRN Virginia Mccullough, CRNP      ibuprofen  400 mg Oral Q6H PRN Virginia Mccullough, CRNP      ibuprofen  600 mg Oral Q8H PRN Virginia Mccullough, CRNP      levothyroxine  125 mcg Oral Early Morning Wendy Anderson, CRNP      loperamide  2 mg Oral Q4H PRN Anatoly Prajapati, DO      LORazepam  1 mg Intramuscular Q6H PRN Max 3/day Virginia Mccullough, CRNP      LORazepam  1 mg Oral Q6H PRN Max 3/day Virginia Mccullough, CRNP      losartan  100 mg Oral Daily Wendy Anderson, CRNP      melatonin  3 mg Oral HS Frederick Smith MD      naltrexone  50 mg Oral Daily Anatoly Prajapati, DO      OLANZapine  10 mg Oral Q6H PRN Frederick Smith MD      Or    OLANZapine  10 mg Intramuscular Q6H PRN Frederick Smith MD      OLANZapine  5 mg Oral Q6H PRN Frederick Smith MD      Or    OLANZapine  5 mg Intramuscular Q6H PRN Frederick Smith MD      OLANZapine  2.5 mg Oral Q3H PRN Frederick Smith MD      OXcarbazepine  450 mg Oral Q12H [facility]ASHA Frederick Smith MD      oxybutynin  5 mg Oral BID JHONATAN Fields      [START ON 1/15/2024] paliperidone  234 mg IM- Deltoid Q4 weeks Frederick Smith MD      pantoprazole  40 mg Oral Early Morning Wendy Anderson, LAURANP      polyethylene glycol  17 g Oral Daily PRN Virginia Mccullough, JHONATAN      saccharomyces boulardii  250 mg Oral BID Anatoly Prajapati, DO      senna-docusate sodium  1 tablet Oral Daily PRN Virginia Mccullough, JHONATAN      topiramate  200 mg Oral BID Anatoly Prajapati, DO      traZODone  50 mg Oral HS PRN Virginia Mccullough, LAURANP      warfarin  6 mg Oral Daily (warfarin) JHONATAN Berg         Risks / Benefits of Treatment:  Risks, benefits, and possible side effects of medications explained to patient and patient verbalizes understanding and  agreement for treatment.    Counseling / Coordination of Care:  Patient's progress reviewed with nursing staff.  Medications, treatment progress and treatment plan reviewed with patient.  Supportive counseling provided to the patient.    Total floor/unit time spent today 25 minutes. Greater than 50% of total time was spent with the patient and / or family counseling and / or coordination of care. A description of the counseling / coordination of care: medication education, treatment plan, supportive therapy.    This note was completed in part utilizing Dragon dictation Software. Grammatical, translation, syntax errors, random word insertions, spelling mistakes, and incomplete sentences may be an occasional consequence of this system secondary to software limitations with voice recognition, ambient noise, and hardware issues. If you have any questions or concerns about the content, text, or information contained within the body of this dictation, please contact the provider for clarification

## 2023-12-28 NOTE — NURSING NOTE
Pt later approached nurses station and stated that she was ready to take medication. VS obtained. Medication compliant. Pt apologetic for behavior this morning. Pt  states that she is just upset/anxious about being here and what discharge plans will be. Pt upset about not seeing ACT team last week and was worried if they were coming today.  Pt was calm and pleasant during assessment but appears depressed. Pt then went to attend group. Pt seen by [facility] Team this afternoon. Pt appears brighter this afternoon. Compliant with Lunch. No further concerns as of present. Plan of care ongoing.

## 2023-12-28 NOTE — PLAN OF CARE
Problem: Ineffective Coping  Goal: Identifies ineffective coping skills  Outcome: Progressing  Goal: Identifies healthy coping skills  Outcome: Progressing  Goal: Demonstrates healthy coping skills  Outcome: Progressing  Goal: Participates in unit activities  Description: Interventions:  - Provide therapeutic environment   - Provide required programming   - Redirect inappropriate behaviors   Outcome: Progressing  Goal: Patient/Family participate in treatment and DC plans  Description: Interventions:  - Provide therapeutic environment  Outcome: Progressing  Goal: Patient/Family verbalizes awareness of resources  Outcome: Progressing  Goal: Understands least restrictive measures  Description: Interventions:  - Utilize least restrictive behavior  Outcome: Progressing  Goal: Free from restraint events  Description: - Utilize least restrictive measures   - Provide behavioral interventions   - Redirect inappropriate behaviors   Outcome: Progressing     Problem: Risk for Self Injury/Neglect  Goal: Treatment Goal: Remain safe during length of stay, learn and adopt new coping skills, and be free of self-injurious ideation, impulses and acts at the time of discharge  Outcome: Progressing  Goal: Verbalize thoughts and feelings  Description: Interventions:  - Assess and re-assess patient's lethality and potential for self-injury  - Engage patient in 1:1 interactions, daily, for a minimum of 15 minutes  - Encourage patient to express feelings, fears, frustrations, hopes  - Establish rapport/trust with patient   Outcome: Progressing  Goal: Refrain from harming self  Description: Interventions:  - Monitor patient closely, per order  - Develop a trusting relationship  - Supervise medication ingestion, monitor effects and side effects   Outcome: Progressing  Goal: Attend and participate in unit activities, including therapeutic, recreational, and educational groups  Description: Interventions:  - Provide therapeutic and  educational activities daily, encourage attendance and participation, and document same in the medical record  - Obtain collateral information, encourage visitation and family involvement in care   Outcome: Progressing  Goal: Recognize maladaptive responses and adopt new coping mechanisms  Outcome: Progressing  Goal: Complete daily ADLs, including personal hygiene independently, as able  Description: Interventions:  - Observe, teach, and assist patient with ADLS  - Monitor and promote a balance of rest/activity, with adequate nutrition and elimination  Outcome: Progressing     Problem: Depression  Goal: Treatment Goal: Demonstrate behavioral control of depressive symptoms, verbalize feelings of improved mood/affect, and adopt new coping skills prior to discharge  Outcome: Progressing  Goal: Verbalize thoughts and feelings  Description: Interventions:  - Assess and re-assess patient's level of risk   - Engage patient in 1:1 interactions, daily, for a minimum of 15 minutes   - Encourage patient to express feelings, fears, frustrations, hopes   Outcome: Progressing  Goal: Refrain from harming self  Description: Interventions:  - Monitor patient closely, per order   - Supervise medication ingestion, monitor effects and side effects   Outcome: Progressing  Goal: Refrain from isolation  Description: Interventions:  - Develop a trusting relationship   - Encourage socialization   Outcome: Progressing  Goal: Refrain from self-neglect  Outcome: Progressing  Goal: Attend and participate in unit activities, including therapeutic, recreational, and educational groups  Description: Interventions:  - Provide therapeutic and educational activities daily, encourage attendance and participation, and document same in the medical record   Outcome: Progressing  Goal: Complete daily ADLs, including personal hygiene independently, as able  Description: Interventions:  - Observe, teach, and assist patient with ADLS  -  Monitor and promote  a balance of rest/activity, with adequate nutrition and elimination   Outcome: Progressing     Problem: Anxiety  Goal: Anxiety is at manageable level  Description: Interventions:  - Assess and monitor patient's anxiety level.   - Monitor for signs and symptoms (heart palpitations, chest pain, shortness of breath, headaches, nausea, feeling jumpy, restlessness, irritable, apprehensive).   - Collaborate with interdisciplinary team and initiate plan and interventions as ordered.  - [place] patient to unit/surroundings  - Explain treatment plan  - Encourage participation in care  - Encourage verbalization of concerns/fears  - Identify coping mechanisms  - Assist in developing anxiety-reducing skills  - Administer/offer alternative therapies  - Limit or eliminate stimulants  Outcome: Progressing     Problem: Risk for Violence/Aggression Toward Others  Goal: Treatment Goal: Refrain from acts of violence/aggression during length of stay, and demonstrate improved impulse control at the time of discharge  Outcome: Progressing  Goal: Verbalize thoughts and feelings  Description: Interventions:  - Assess and re-assess patient's level of risk, every waking shift  - Engage patient in 1:1 interactions, daily, for a minimum of 15 minutes   - Allow patient to express feelings and frustrations in a safe and non-threatening manner   - Establish rapport/trust with patient   Outcome: Progressing  Goal: Refrain from harming others  Outcome: Progressing  Goal: Refrain from destructive acts on the environment or property  Outcome: Progressing  Goal: Control angry outbursts  Description: Interventions:  - Monitor patient closely, per order  - Ensure early verbal de-escalation  - Monitor prn medication needs  - Set reasonable/therapeutic limits, outline behavioral expectations, and consequences   - Provide a non-threatening milieu, utilizing the least restrictive interventions   Outcome: Progressing  Goal: Attend and participate in unit  activities, including therapeutic, recreational, and educational groups  Description: Interventions:  - Provide therapeutic and educational activities daily, encourage attendance and participation, and document same in the medical record   Outcome: Progressing  Goal: Identify appropriate positive anger management techniques  Description: Interventions:  - Offer anger management and coping skills groups   - Staff will provide positive feedback for appropriate anger control  Outcome: Progressing     Problem: SAFETY, RESTRAINT - VIOLENT/SELF-DESTRUCTIVE  Goal: Remains free of harm/injury from restraints (Restraint for Violent/Self-Destructive Behavior)  Description: INTERVENTIONS:  - Instruct patient/family regarding restraint use   - Assess and monitor physiologic and psychological status   - Provide interventions and comfort measures to meet assessed patient needs   - Ensure continuous in person monitoring is provided   - Identify and implement measures to help patient regain control  - Assess readiness for release of restraint  Outcome: Progressing  Goal: Returns to optimal restraint-free functioning  Description: INTERVENTIONS:  - Assess the patient's behavior and symptoms that indicate continued need for restraint  - Identify and implement measures to help patient regain control  - Assess readiness for release of restraint   Outcome: Progressing

## 2023-12-28 NOTE — NURSING NOTE
"Tech informed nurse that Pt was making threatening remarks about another Pt. Per tech pt stated \" I am going to lose my shit on him if he doesn't stop walking past my room\". Upon this writer entering Pt's room. Pt appeared tense/agitated while sitting on bed with her fists clenched. Pt states a peer was causing her to become agitated because he is loud and keeps walking past her room. Pt states \" He is annoying me , he is too loud and he keeps walking up and down and no one is making it stop.  I also can't go watch tv because the other person bothers me too so I can't calm myself down because I have to isolate in my room\". Informed Pt that we can't restrict other pt's to their room simply because they are walking in the milieu and talking loudly. Pt then threw book across the room at the wall. Pt denied need for a prn. Educated Pt that at this time it would be best for her to take a prn for agitation so that her behavior doesn't continue to escalate and due to the fact that she is threatening other pt's safety. This writer then went to get prn medication ready. Other staff witnessed Pt pushing her bedside table across the room. Pt offered PRN PO Zyprexa and pt refused to speak to this writer and refused PO medication. Pt was shaking and clenching onto bedside rails. Pt continues to threaten that if the pt doesn't stop she will flip out.  Offered Pt PO medication several times. 1809 PRN Zyprexa 10 mg IM given in left deltoid. Pt educated on coping mechanisms and advised to remain in room until she felt she could be in behavioral control. No further concerns at this time.   "

## 2023-12-28 NOTE — NURSING NOTE
Pt withdrawn to room most of the shift. Tearful at times, blaming others. Appetite good. Medication compliant. Denies SI.

## 2023-12-28 NOTE — TREATMENT PLAN
TREATMENT PLAN REVIEW - Behavioral Health Blanca Mason 53 y.o. 1970 female MRN: [id number]    [facility] 6B [facility] Room / Bed: [facility] 651/[facility] 651-02 Encounter: [id number]          Admit Date/Time:  12/1/2023  8:47 PM    Treatment Team:   MD Amaury Danielle, DO Wendy Anderson, JHONATAN Law, DO Cassia Powers, FRANK Chavez RN Stacie Searle, SHONDA Fontenot    Diagnosis: Principal Problem:    Schizoaffective disorder, bipolar type (HCC)  Active Problems:    Benign essential hypertension    Edema    Hypothyroidism    MAG (obstructive sleep apnea)    Overactive bladder    Sjogren's syndrome ([facility])    Medical clearance for psychiatric admission    Anxiety    Borderline personality disorder ([facility])    History of pulmonary embolism    Ingrown toenail      Patient Strengths/Assets: cooperative, communication skills, compliant with medication, motivated, negotiates basic needs, patient is on a voluntary commitment, patient is willing to work on problems, reasoning ability    Patient Barriers/Limitations: difficulty adapting, limited support system, poor past treatment response, poor self-care, resistance to treatment, limited insight    Short Term Goals: decrease in depressive symptoms, decrease in anxiety symptoms, decrease in paranoid thoughts, ability to stay safe on the unit, ability to stay free of restraints, improvement in ability to express basic needs, improvement in insight, improvement in reasoning ability, improvement in self care, sleep improvement, improvement in appetite, mood stabilization, increase in group attendance, increase in socialization with peers on the unit, acceptance of need for psychiatric treatment, acceptance of psychiatric medications    Long Term Goals: improvement in depression, improvement in anxiety, stabilization of mood,  free of suicidal thoughts, free of homicidal thoughts, no self abusive behavior, improvement in reasoning ability, improved insight, able to express basic needs, acceptance of need for psychiatric medications, acceptance of need for psychiatric treatment, acceptance of need for psychiatric follow up after discharge, acceptance of psychiatric diagnosis, adequate self care, adequate sleep, adequate appetite, appropriate interaction with peers, stable living arrangements upon discharge, establishment of family support upon discharge    Progress Towards Goals: continue psychiatric medications as prescribed, progressing slowly, insight is slowly improving, participates in milieu therapy, reality testing is improving, less irritable, less paranoid, placement pending    Recommended Treatment: medication management, patient medication education, group therapy, milieu therapy, continued Behavioral Health psychiatric evaluation/assessment process    Treatment Frequency: daily medication monitoring, group and milieu therapy daily, monitoring through interdisciplinary rounds, monitoring through weekly patient care conferences    Expected Discharge Date:  1/27/24    Discharge Plan: referral to Extended Acute Care unit for a long term psychiatric treatment    Treatment Plan Created/Updated By: Isael Quispe MD

## 2023-12-28 NOTE — NURSING NOTE
"Pt wanted her armpits shaved by tech at the time that she asked the tech was busy. When the tech could shave her, the patient refused and said she would wait til tomorrow. When the rounder was doing the 7 minute checks they found her crying in the room and reported it to this nurse. Nurse went in to talk to her and she said \" I kept asking when she wasn't busy but she didn't have the time for me now I feel like I am just a bother.\"  This nurse went and obtained the electric razor and shaved the patients armpits.  "

## 2023-12-28 NOTE — NURSING NOTE
Pt observed sitting on bed crying.  Per tech Pt was upset that she didn't get her VS right away. This writer went in to speak to Pt and pt refused to discuss what she was upset about. Pt then refused to allow staff to do VS. Pt refusing Vs at 0930 ( multiple staff attempted).  Pt is also refusing to speak to this writer and refusing to take medication when offered.  Pt lying in bed with eyes closed and is refusing to speak or move. Breathing easy and unlabored. No s/s of distress or discomfort observed.

## 2023-12-28 NOTE — PROGRESS NOTES
12/28/23 0806   Team Meeting   Meeting Type Daily Rounds   Initial Conference Date 12/28/23   Next Conference Date 12/29/23   Team Members Present   Team Members Present Physician;Nurse;;;Occupational Therapist   Physician Team Member Dr Quispe, SHEFALI Camargo   Nursing Team Member Jennifer   Care Management Team Member Atiya   Social Work Team Member Dalia   OT Team Member Yeison   Patient/Family Present   Patient Present No   Patient's Family Present No     Preoccupied with tech and paranoid, wants to go home instead of [facility].

## 2023-12-28 NOTE — PROGRESS NOTES
12/28/23 1330   Activity/Group Checklist   Group Life Skills  (memory game and topic of self awarness.)   Attendance Attended   Attendance Duration (min) 46-60   Interactions Other (Comment)  (Pt. declined to join the game table for memory card task stating that she had a headache from someone else that talked too much.Pt. did express pride in self for being clean for several months and plans to stay out of the hospital next year.)   Affect/Mood Calm  (smiling more and giving self credit for her accomplishments.)   Goals Achieved Able to engage in interactions

## 2023-12-28 NOTE — TREATMENT TEAM
Pt attended am group (Smart goals).  Pt was able to review steps.  Pt indicated she wants to stay out of the hospital and take her meds correctly.  Pt indicated her team will be providing support and monitoring her. Pt spoke about her OD hx.      12/28/23 1000   Activity/Group Checklist   Group Community meeting   Attendance Attended   Attendance Duration (min) 31-45   Interactions Interacted appropriately   Affect/Mood Appropriate   Goals Achieved Identified feelings;Identified relapse prevention strategies;Identified triggers;Able to listen to others;Able to engage in interactions;Able to manage/cope with feelings;Able to reflect/comment on own behavior;Able to self-disclose;Able to recieve feedback

## 2023-12-29 PROCEDURE — 99232 SBSQ HOSP IP/OBS MODERATE 35: CPT | Performed by: STUDENT IN AN ORGANIZED HEALTH CARE EDUCATION/TRAINING PROGRAM

## 2023-12-29 RX ADMIN — MELATONIN TAB 3 MG 3 MG: 3 TAB at 21:45

## 2023-12-29 RX ADMIN — BUPROPION HYDROCHLORIDE 450 MG: 300 TABLET, EXTENDED RELEASE ORAL at 08:06

## 2023-12-29 RX ADMIN — TOPIRAMATE 200 MG: 100 TABLET, FILM COATED ORAL at 17:13

## 2023-12-29 RX ADMIN — LEVOTHYROXINE SODIUM 125 MCG: 125 TABLET ORAL at 06:01

## 2023-12-29 RX ADMIN — LOSARTAN POTASSIUM 100 MG: 50 TABLET, FILM COATED ORAL at 08:07

## 2023-12-29 RX ADMIN — OXYBUTYNIN CHLORIDE 5 MG: 5 TABLET ORAL at 17:13

## 2023-12-29 RX ADMIN — CYANOCOBALAMIN TAB 1000 MCG 1000 MCG: 1000 TAB at 08:06

## 2023-12-29 RX ADMIN — HYDROXYZINE HYDROCHLORIDE 50 MG: 50 TABLET, FILM COATED ORAL at 06:01

## 2023-12-29 RX ADMIN — FUROSEMIDE 20 MG: 20 TABLET ORAL at 08:06

## 2023-12-29 RX ADMIN — PANTOPRAZOLE SODIUM 40 MG: 40 TABLET, DELAYED RELEASE ORAL at 06:01

## 2023-12-29 RX ADMIN — NALTREXONE HYDROCHLORIDE 50 MG: 50 TABLET, FILM COATED ORAL at 08:06

## 2023-12-29 RX ADMIN — OXYBUTYNIN CHLORIDE 5 MG: 5 TABLET ORAL at 09:11

## 2023-12-29 RX ADMIN — OXCARBAZEPINE 450 MG: 150 TABLET, FILM COATED ORAL at 21:45

## 2023-12-29 RX ADMIN — Medication 250 MG: at 08:07

## 2023-12-29 RX ADMIN — TOPIRAMATE 200 MG: 100 TABLET, FILM COATED ORAL at 08:06

## 2023-12-29 RX ADMIN — WARFARIN SODIUM 6 MG: 2 TABLET ORAL at 17:13

## 2023-12-29 RX ADMIN — CLONIDINE HYDROCHLORIDE 0.1 MG: 0.1 TABLET ORAL at 08:06

## 2023-12-29 RX ADMIN — Medication 250 MG: at 17:13

## 2023-12-29 RX ADMIN — ATOMOXETINE 40 MG: 40 CAPSULE ORAL at 08:07

## 2023-12-29 RX ADMIN — OXCARBAZEPINE 450 MG: 150 TABLET, FILM COATED ORAL at 08:06

## 2023-12-29 NOTE — TREATMENT TEAM
"   12/29/23 0600   Dillon Anxiety Scale   Anxious Mood 3   Tension 3   Fears 3   Insomnia 0   Intellectual 2   Depressed Mood 0   Somatic Complaints: Muscular 2   Somatic Complaints: Sensory 0   Cardiovascular Symptoms 1   Respiratory Symptoms 0   Gastrointestinal Symptoms 0   Genitourinary Symptoms 0   Autonomic Symptoms 2   Behavior at Interview 2   Dillon Anxiety Score 18     Pt approached nurse's station visibly upset. Pt stated she feels that she is treated unfairly here. Pt reassured, but pt remained anxious and preoccupied. Pt offered PO PRN and pt was hesitant stating \"This will just go against me.\" Pt reminded that PRNs are to help her when she feels this way. Pt accepted PO PRN. Atarax 50 mg administered at 0601.  "

## 2023-12-29 NOTE — CASE MANAGEMENT
This morning as this writer was coming in for the day, the patient approached stating she was being singled out by a tech regarding her food trays and by the time she gets her food it is cold. She was told that the tech that she is saying is singling her out is not working with her at this time and someone else is assigned to assist her. She then said that when someone has an issue with a staff member they usually move that staff member to [facility] unit.     In the afternoon the patient asked to see this writer, she was upset that the staff was going to move a peer in her room that she said she didn't like because she is always talking. She reports that she is being singled out and treated badly and that we are testing her to make her act out. Patient was counseled and told that she is the only one that is in control of her behaviors and if she continues to act out and be paranoid regarding staff that she will hinder the progress that she has made thus far.     Patient calmed down after the talk and thanked this writer.

## 2023-12-29 NOTE — CMS CERTIFICATION NOTE
Recertification: Based upon physical, mental and social evaluations, I certify that inpatient psychiatric services continue to be medically necessary for this patient for a duration of 12 midnights for the treatment of  Schizoaffective disorder, bipolar type (HCC) Available alternative community resources still do not meet the patient's mental health care needs. I further attest that an established written individualized plan of care has been updated and is outlined in the patient's medical records.    Isale Quispe MD 12/29/23

## 2023-12-29 NOTE — PROGRESS NOTES
12/29/23 0750   Team Meeting   Meeting Type Daily Rounds   Initial Conference Date 12/29/23   Next Conference Date 01/02/24   Team Members Present   Team Members Present Physician;Nurse;;;Occupational Therapist   Physician Team Member Dr Quispe, SHEFALI Camargo   Nursing Team Member Misael   Care Management Team Member Atiya   Social Work Team Member Dalia   OT Team Member Yeison   Patient/Family Present   Patient Present No   Patient's Family Present No     SNOWDEN 12/14, did not get vitals and breakfast right away so complaining of staff, threats toward peers, threw book, attending groups. EAC possible, discharge pending.

## 2023-12-29 NOTE — NURSING NOTE
"Pt withdrawn to room this evening. Pt guarded upon approach. Pt cooperative with meds/meals but was scant in conversation. When asked assessment questions, pt did not answer questions. Pt provided HS medications and CPAP and patient replied \"thank you.\" Pt currently resting in bed with even, unlabored respirations. Pt is able to and encouraged to inform staff of any needs.  "

## 2023-12-29 NOTE — NURSING NOTE
"Pt anxious with tearful episode when informed of female peer being moved to her room.  Peer was switched with another female peer but patient insisted that staff was singling her out to \"test\" her.  Pt could not get herself together until she spoke with her .  Med-compliant with good appetite and steady gait.  VSS.  Attended group.  Denied SI.  Monitored for safety and support.  "

## 2023-12-29 NOTE — TREATMENT TEAM
Pt attended group.  Constricted affect, poor eye contact.  Pt indicates she wants to go home but unable to be accountable for her actions and behaviors.  Superficial responses.     12/29/23 1000   Activity/Group Checklist   Group Community meeting   Attendance Attended   Attendance Duration (min) 46-60   Interactions Other (Comment)  (poor eye contact, scant)   Affect/Mood Constricted   Goals Achieved Identified feelings;Identified relapse prevention strategies;Discussed coping strategies;Able to listen to others;Able to engage in interactions;Able to self-disclose

## 2023-12-29 NOTE — PLAN OF CARE
Problem: Ineffective Coping  Goal: Identifies ineffective coping skills  Outcome: Progressing  Goal: Identifies healthy coping skills  Outcome: Progressing  Goal: Demonstrates healthy coping skills  Outcome: Not Progressing  Goal: Participates in unit activities  Description: Interventions:  - Provide therapeutic environment   - Provide required programming   - Redirect inappropriate behaviors   Outcome: Progressing  Goal: Patient/Family participate in treatment and DC plans  Description: Interventions:  - Provide therapeutic environment  Outcome: Progressing  Goal: Patient/Family verbalizes awareness of resources  Outcome: Progressing  Goal: Understands least restrictive measures  Description: Interventions:  - Utilize least restrictive behavior  Outcome: Not Progressing  Goal: Free from restraint events  Description: - Utilize least restrictive measures   - Provide behavioral interventions   - Redirect inappropriate behaviors   Outcome: Progressing     Problem: Risk for Self Injury/Neglect  Goal: Treatment Goal: Remain safe during length of stay, learn and adopt new coping skills, and be free of self-injurious ideation, impulses and acts at the time of discharge  Outcome: Progressing  Goal: Verbalize thoughts and feelings  Description: Interventions:  - Assess and re-assess patient's lethality and potential for self-injury  - Engage patient in 1:1 interactions, daily, for a minimum of 15 minutes  - Encourage patient to express feelings, fears, frustrations, hopes  - Establish rapport/trust with patient   Outcome: Not Progressing  Goal: Refrain from harming self  Description: Interventions:  - Monitor patient closely, per order  - Develop a trusting relationship  - Supervise medication ingestion, monitor effects and side effects   Outcome: Progressing  Goal: Attend and participate in unit activities, including therapeutic, recreational, and educational groups  Description: Interventions:  - Provide therapeutic  and educational activities daily, encourage attendance and participation, and document same in the medical record  - Obtain collateral information, encourage visitation and family involvement in care   Outcome: Progressing  Goal: Recognize maladaptive responses and adopt new coping mechanisms  Outcome: Progressing  Goal: Complete daily ADLs, including personal hygiene independently, as able  Description: Interventions:  - Observe, teach, and assist patient with ADLS  - Monitor and promote a balance of rest/activity, with adequate nutrition and elimination  Outcome: Progressing     Problem: Depression  Goal: Treatment Goal: Demonstrate behavioral control of depressive symptoms, verbalize feelings of improved mood/affect, and adopt new coping skills prior to discharge  Outcome: Progressing  Goal: Verbalize thoughts and feelings  Description: Interventions:  - Assess and re-assess patient's level of risk   - Engage patient in 1:1 interactions, daily, for a minimum of 15 minutes   - Encourage patient to express feelings, fears, frustrations, hopes   Outcome: Progressing  Goal: Refrain from harming self  Description: Interventions:  - Monitor patient closely, per order   - Supervise medication ingestion, monitor effects and side effects   Outcome: Progressing  Goal: Refrain from isolation  Description: Interventions:  - Develop a trusting relationship   - Encourage socialization   Outcome: Progressing  Goal: Refrain from self-neglect  Outcome: Progressing  Goal: Attend and participate in unit activities, including therapeutic, recreational, and educational groups  Description: Interventions:  - Provide therapeutic and educational activities daily, encourage attendance and participation, and document same in the medical record   Outcome: Progressing  Goal: Complete daily ADLs, including personal hygiene independently, as able  Description: Interventions:  - Observe, teach, and assist patient with ADLS  -  Monitor and  promote a balance of rest/activity, with adequate nutrition and elimination   Outcome: Progressing     Problem: Anxiety  Goal: Anxiety is at manageable level  Description: Interventions:  - Assess and monitor patient's anxiety level.   - Monitor for signs and symptoms (heart palpitations, chest pain, shortness of breath, headaches, nausea, feeling jumpy, restlessness, irritable, apprehensive).   - Collaborate with interdisciplinary team and initiate plan and interventions as ordered.  - Stem patient to unit/surroundings  - Explain treatment plan  - Encourage participation in care  - Encourage verbalization of concerns/fears  - Identify coping mechanisms  - Assist in developing anxiety-reducing skills  - Administer/offer alternative therapies  - Limit or eliminate stimulants  Outcome: Not Progressing     Problem: Risk for Violence/Aggression Toward Others  Goal: Treatment Goal: Refrain from acts of violence/aggression during length of stay, and demonstrate improved impulse control at the time of discharge  Outcome: Progressing  Goal: Verbalize thoughts and feelings  Description: Interventions:  - Assess and re-assess patient's level of risk, every waking shift  - Engage patient in 1:1 interactions, daily, for a minimum of 15 minutes   - Allow patient to express feelings and frustrations in a safe and non-threatening manner   - Establish rapport/trust with patient   Outcome: Progressing  Goal: Refrain from harming others  Outcome: Progressing  Goal: Refrain from destructive acts on the environment or property  Outcome: Not Progressing  Goal: Control angry outbursts  Description: Interventions:  - Monitor patient closely, per order  - Ensure early verbal de-escalation  - Monitor prn medication needs  - Set reasonable/therapeutic limits, outline behavioral expectations, and consequences   - Provide a non-threatening milieu, utilizing the least restrictive interventions   Outcome: Not Progressing  Goal: Attend and  participate in unit activities, including therapeutic, recreational, and educational groups  Description: Interventions:  - Provide therapeutic and educational activities daily, encourage attendance and participation, and document same in the medical record   Outcome: Progressing  Goal: Identify appropriate positive anger management techniques  Description: Interventions:  - Offer anger management and coping skills groups   - Staff will provide positive feedback for appropriate anger control  Outcome: Progressing     Problem: SAFETY, RESTRAINT - VIOLENT/SELF-DESTRUCTIVE  Goal: Remains free of harm/injury from restraints (Restraint for Violent/Self-Destructive Behavior)  Description: INTERVENTIONS:  - Instruct patient/family regarding restraint use   - Assess and monitor physiologic and psychological status   - Provide interventions and comfort measures to meet assessed patient needs   - Ensure continuous in person monitoring is provided   - Identify and implement measures to help patient regain control  - Assess readiness for release of restraint  Outcome: Progressing  Goal: Returns to optimal restraint-free functioning  Description: INTERVENTIONS:  - Assess the patient's behavior and symptoms that indicate continued need for restraint  - Identify and implement measures to help patient regain control  - Assess readiness for release of restraint   Outcome: Progressing

## 2023-12-29 NOTE — PROGRESS NOTES
Progress Note - Behavioral Health   Blanca Mason 53 y.o. female MRN: [id number]  Unit/Bed#: [facility] 651-02 Encounter: [id number]    Patient was seen today for continuation of care, records reviewed and patient was discussed with the morning case review team.    Blanca was seen today for psychiatric follow-up.  On assessment today, Blanca was superficial and guarded.  Preoccupied with treatment she feels is unfair by staff, patient is also noted to be irritable with peers.  Zyprexa 10 mg IM given for increased agitation, as well as throwing books in her desk across the room.  Patient continues to have low frustration level, as well as exhibit lack of insight and judgment into her actions.  No medication changes to be made at this time, patient to be continued on current regimen.  Discharge disposition ongoing as patient will need EAC referral due to elevated suicidality in the community.    Blanca denies acute suicidal/self-harm ideation/intent/plan upon direct inquiry at this time.  Blanca remains behaviorally appropriate, no agitation or aggression noted on exam or in report.  Blanca also denies HI/AH/VH, and does not appear overtly manic.  No overt delusions or paranoia are verbalized. Impulse control is ongoing.  Blanca remains adherent to her current psychotropic medication regimen and denies any side effects from medications, as well as none noted on exam.    Vitals:  Vitals:    12/29/23 0747   BP: 170/80   Pulse: 78   Resp: 16   Temp: (!) 97.2 °F (36.2 °C)   SpO2: 96%       Laboratory Results:  I have personally reviewed all pertinent laboratory/tests results.  Most Recent Labs:   Lab Results   Component Value Date    WBC 8.96 12/02/2023    RBC 4.34 12/02/2023    HGB 12.9 12/02/2023    HCT 40.5 12/02/2023     12/02/2023    RDW 14.2 12/02/2023    TOTANEUTABS 4.73 11/19/2023    NEUTROABS 5.77 12/02/2023    SODIUM 139 12/02/2023    K 3.8 12/02/2023     12/02/2023    CO2 21 12/02/2023    BUN 13 12/02/2023     CREATININE 0.62 12/02/2023    GLUC 107 12/02/2023    GLUF 107 (H) 12/02/2023    CALCIUM 8.7 12/02/2023    AST 13 12/02/2023    ALT 13 12/02/2023    ALKPHOS 55 12/02/2023    TP 6.0 (L) 12/02/2023    ALB 3.5 12/02/2023    TBILI 0.25 12/02/2023    CHOLESTEROL 234 (H) 03/09/2023    HDL 71 03/09/2023    TRIG 153 (H) 03/09/2023    LDLCALC 132 (H) 03/09/2023    NONHDLC 163 03/09/2023    VALPROICTOT 23 (L) 11/10/2023    AMMONIA 63 11/06/2023    FFZ0TCAIVCMP 4.324 12/02/2023    FREET4 0.81 11/06/2023    T3FREE 2.27 (L) 10/16/2019    PREGUR negative 07/13/2022    PREGSERUM Negative 11/06/2023    RPR Non-Reactive 07/17/2022    HGBA1C 5.0 01/05/2023    EAG 97 01/05/2023       Psychiatric Review of Systems:  Behavior over the last 24 hours:  unchanged.   Sleep: good  Appetite: good  Medication side effects: none  ROS: no complaints, denies shortness of breath or chest pain and all other systems are negative for acute changes    Mental Status Evaluation:  Appearance:  dressed appropriately   Behavior:  agitated, demanding, guarded   Speech:  normal rate and volume   Mood:  irritable   Affect:  constricted   Thought Process:  linear   Thought Content:  no overt delusions, negative thoughts   Perceptual Disturbances: denies auditory hallucinations when asked, does not appear responding to internal stimuli   Risk Potential: Suicidal ideation - None  Homicidal ideation - None  Potential for aggression - No   Memory:  recent and remote memory grossly intact   Sensorium  person, place, and time/date      Consciousness:  alert and awake   Attention: attention span and concentration are age appropriate   Insight:  limited   Judgment: limited   Gait/Station: normal gait/station   Motor Activity: no abnormal movements   Progress Toward Goals:   Blanca is progressing towards goals of inpatient psychiatric treatment by continued medication compliance and is attending therapeutic modalities on the milieu. However, the patient continues to  require inpatient psychiatric hospitalization for continued medication management and titration to optimize symptom reduction, improve sleep hygiene, and demonstrate adequate self-care.    Assessment/Plan   Principal Problem:    Schizoaffective disorder, bipolar type (HCC)  Active Problems:    Benign essential hypertension    Edema    Hypothyroidism    MAG (obstructive sleep apnea)    Overactive bladder    Sjogren's syndrome (HCC)    Medical clearance for psychiatric admission    Anxiety    Borderline personality disorder (HCC)    History of pulmonary embolism    Ingrown toenail      Recommended Treatment: Treatment plan and medication changes discussed and per the attending physician the plan is:    1.Continue with group therapy, milieu therapy and occupational therapy  2.Behavioral Health checks every 7 minutes  3.Continue frequent safety checks and vitals per unit protocol  4.Continue with SLIM medical management as indicated  5.Continue with current medication regimen  6.Will review labs in the a.m.  7.Disposition Planning: Discharge planning and efforts remain ongoing    Behavioral Health Medications: all current active meds have been reviewed and continue current psychiatric medications.  Current Facility-Administered Medications   Medication Dose Route Frequency Provider Last Rate    aluminum-magnesium hydroxide-simethicone  30 mL Oral Q4H PRN JHONATAN Fields      atoMOXetine  40 mg Oral Daily Anatoly Prajapati,       buPROPion  450 mg Oral Daily Anatoly Prajapati, DO      cloNIDine  0.1 mg Oral Q12H [facility] Anatoly Prajapati,       cyanocobalamin  1,000 mcg Oral Daily JHONATAN Berg      cyanocobalamin  1,000 mcg Intramuscular Q30 Days JHONATAN Berg      ergocalciferol  50,000 Units Oral Weekly JHONATAN Berg      furosemide  20 mg Oral Daily JHONATAN Fields      hydrOXYzine HCL  25 mg Oral Q6H PRN Max 4/day JHONATAN Anne  HCL  50 mg Oral Q6H PRN Max 4/day Virginia Mccullough, CRNP      ibuprofen  200 mg Oral Q6H PRN Virginia Mccullough, CRNP      ibuprofen  400 mg Oral Q6H PRN Virginia Mccullough, CRNP      ibuprofen  600 mg Oral Q8H PRN Virginia Mccullough, CRNP      levothyroxine  125 mcg Oral Early Morning Wendy Anderson, LAURANP      loperamide  2 mg Oral Q4H PRN Anatoly Prajapati, DO      LORazepam  1 mg Intramuscular Q6H PRN Max 3/day Virginia Mccullough, CRNP      LORazepam  1 mg Oral Q6H PRN Max 3/day Virginia Mccullough, CRNP      losartan  100 mg Oral Daily Wendy Anderson, JHONATAN      melatonin  3 mg Oral HS Frederick Smith MD      naltrexone  50 mg Oral Daily Anatoly Prajapati, DO      OLANZapine  10 mg Oral Q6H PRN Frederick Smith MD      Or    OLANZapine  10 mg Intramuscular Q6H PRN Frederick Smith MD      OLANZapine  5 mg Oral Q6H PRN Frederick Smith MD      Or    OLANZapine  5 mg Intramuscular Q6H PRN Frederick Smith MD      OLANZapine  2.5 mg Oral Q3H PRN Frederick Smith MD      OXcarbazepine  450 mg Oral Q12H TASHA Frederick Smith MD      oxybutynin  5 mg Oral BID JHONATAN Fields      [START ON 1/15/2024] paliperidone  234 mg IM- Deltoid Q4 weeks Frederick Smith MD      pantoprazole  40 mg Oral Early Morning JHONATAN Fields      polyethylene glycol  17 g Oral Daily PRN Virginia Mccullough, JHONATAN      saccharomyces boulardii  250 mg Oral BID Anatoly Prajapati, DO      senna-docusate sodium  1 tablet Oral Daily PRN Virginia Mccullough, JHONATAN      topiramate  200 mg Oral BID Anatoly Prajapati, DO      traZODone  50 mg Oral HS PRN Virginia Mccullough, JHONATAN      warfarin  6 mg Oral Daily (warfarin) JHONATAN Berg         Risks / Benefits of Treatment:  Risks, benefits, and possible side effects of medications explained to patient and patient verbalizes understanding and agreement for treatment.    Counseling / Coordination of Care:  Patient's progress reviewed with nursing staff.  Medications, treatment progress and  treatment plan reviewed with patient.  Supportive counseling provided to the patient.    Total floor/unit time spent today 25 minutes. Greater than 50% of total time was spent with the patient and / or family counseling and / or coordination of care. A description of the counseling / coordination of care: medication education, treatment plan, supportive therapy.    This note was completed in part utilizing Dragon dictation Software. Grammatical, translation, syntax errors, random word insertions, spelling mistakes, and incomplete sentences may be an occasional consequence of this system secondary to software limitations with voice recognition, ambient noise, and hardware issues. If you have any questions or concerns about the content, text, or information contained within the body of this dictation, please contact the provider for clarification

## 2023-12-30 LAB
INR PPP: 1.89 (ref 0.84–1.19)
PROTHROMBIN TIME: 22 SECONDS (ref 11.6–14.5)

## 2023-12-30 PROCEDURE — 99232 SBSQ HOSP IP/OBS MODERATE 35: CPT

## 2023-12-30 PROCEDURE — 85610 PROTHROMBIN TIME: CPT | Performed by: NURSE PRACTITIONER

## 2023-12-30 RX ADMIN — Medication 250 MG: at 08:09

## 2023-12-30 RX ADMIN — Medication 250 MG: at 17:03

## 2023-12-30 RX ADMIN — LOSARTAN POTASSIUM 100 MG: 50 TABLET, FILM COATED ORAL at 08:09

## 2023-12-30 RX ADMIN — MELATONIN TAB 3 MG 3 MG: 3 TAB at 21:32

## 2023-12-30 RX ADMIN — CLONIDINE HYDROCHLORIDE 0.1 MG: 0.1 TABLET ORAL at 21:29

## 2023-12-30 RX ADMIN — BUPROPION HYDROCHLORIDE 450 MG: 300 TABLET, EXTENDED RELEASE ORAL at 08:09

## 2023-12-30 RX ADMIN — TOPIRAMATE 200 MG: 100 TABLET, FILM COATED ORAL at 17:03

## 2023-12-30 RX ADMIN — OXCARBAZEPINE 450 MG: 150 TABLET, FILM COATED ORAL at 21:29

## 2023-12-30 RX ADMIN — CLONIDINE HYDROCHLORIDE 0.1 MG: 0.1 TABLET ORAL at 08:09

## 2023-12-30 RX ADMIN — CYANOCOBALAMIN TAB 1000 MCG 1000 MCG: 1000 TAB at 08:09

## 2023-12-30 RX ADMIN — LEVOTHYROXINE SODIUM 125 MCG: 125 TABLET ORAL at 05:51

## 2023-12-30 RX ADMIN — OXCARBAZEPINE 450 MG: 150 TABLET, FILM COATED ORAL at 08:09

## 2023-12-30 RX ADMIN — PANTOPRAZOLE SODIUM 40 MG: 40 TABLET, DELAYED RELEASE ORAL at 05:51

## 2023-12-30 RX ADMIN — FUROSEMIDE 20 MG: 20 TABLET ORAL at 08:10

## 2023-12-30 RX ADMIN — NALTREXONE HYDROCHLORIDE 50 MG: 50 TABLET, FILM COATED ORAL at 08:09

## 2023-12-30 RX ADMIN — TOPIRAMATE 200 MG: 100 TABLET, FILM COATED ORAL at 08:09

## 2023-12-30 RX ADMIN — OXYBUTYNIN CHLORIDE 5 MG: 5 TABLET ORAL at 08:09

## 2023-12-30 RX ADMIN — OXYBUTYNIN CHLORIDE 5 MG: 5 TABLET ORAL at 17:03

## 2023-12-30 RX ADMIN — ATOMOXETINE 40 MG: 40 CAPSULE ORAL at 08:09

## 2023-12-30 NOTE — NURSING NOTE
Pt calm and cooperative. +meals and meds. Visible and selectively social on the unit. Spends more time isolative to self in room than previous shifts. Denies SI/HI. Denies psychosis. Spends time in the dining room watching TV, and reading alone in bedroom. Requests 1:1 time from staff after becoming upset on a phone call; upset and missing pet cat. Reassurance provided. No acute behaviors or need to utilize PRN medications. Q7's maintained. Will cont to provide support as needed.

## 2023-12-30 NOTE — NURSING NOTE
Pt irritable and blaming others. Tearful at times. Not able to control anger at times. Feels peers on the unit are getting more attention than her when it comes to the television. Denies SI. Medication compliant. Appetite good. .

## 2023-12-30 NOTE — PROGRESS NOTES
Progress Note - Behavioral Health   Blanca Mason 53 y.o. female MRN: [id number]  Unit/Bed#: [facility] 651-02 Encounter: [id number]    Patient was seen today for continuation of care, records reviewed and patient was discussed with the morning case review team.  Per staff, Blanca is meal and medication compliant.  Can become irritable and attention seeking at times.     Blanca was seen today for psychiatric follow-up.  On assessment today, Blanca was seen in the brown away from peers.  Blanca was calm and cooperative with the interview.  Blanca was remorseful today about an incident yesterday where she became upset and threw a book.  She states that she was upset about a roommate change.  We discussed using coping skills to address frustrating situations.  Blanca was somewhat receptive but continues to blame others for behaviors.  She feels her mood has improved since admission and reports she is eating and sleeping well.      Blanca denies acute suicidal/self-harm ideation/intent/plan upon direct inquiry at this time.  Blanca remains labile and irritable at times but no agitation or aggression noted on exam or in report today.  Blanca also denies HI/AH/VH, and does not appear overtly manic.  No overt delusions or paranoia are verbalized.  Blanca remains adherent to her current psychotropic medication regimen and denies any side effects from medications, as well as none noted on exam.    Recommended Treatment: Treatment plan and medication changes discussed and per the attending physician the plan is:    1.Continue with group therapy, milieu therapy and occupational therapy  2.Behavioral Health checks every 7 minutes  3.Continue frequent safety checks and vitals per unit protocol  4.Continue with [facility] medical management as indicated  5.Continue with current medication regimen, referral to [facility] pending  6.Will review labs in the a.m.  7.Disposition Planning: Discharge planning and efforts remain ongoing    Vitals:  Vitals:    12/30/23 0728    BP: 157/93   Pulse: 71   Resp: 16   Temp:    SpO2: 96%       Laboratory Results:  I have personally reviewed all pertinent laboratory/tests results.  Most Recent Labs:   Lab Results   Component Value Date    WBC 8.96 12/02/2023    RBC 4.34 12/02/2023    HGB 12.9 12/02/2023    HCT 40.5 12/02/2023     12/02/2023    RDW 14.2 12/02/2023    TOTANEUTABS 4.73 11/19/2023    NEUTROABS 5.77 12/02/2023    SODIUM 139 12/02/2023    K 3.8 12/02/2023     12/02/2023    CO2 21 12/02/2023    BUN 13 12/02/2023    CREATININE 0.62 12/02/2023    GLUC 107 12/02/2023    GLUF 107 (H) 12/02/2023    CALCIUM 8.7 12/02/2023    AST 13 12/02/2023    ALT 13 12/02/2023    ALKPHOS 55 12/02/2023    TP 6.0 (L) 12/02/2023    ALB 3.5 12/02/2023    TBILI 0.25 12/02/2023    CHOLESTEROL 234 (H) 03/09/2023    HDL 71 03/09/2023    TRIG 153 (H) 03/09/2023    LDLCALC 132 (H) 03/09/2023    NONHDLC 163 03/09/2023    VALPROICTOT 23 (L) 11/10/2023    AMMONIA 63 11/06/2023    [id number] 4.324 12/02/2023    FREET4 0.81 11/06/2023    T3FREE 2.27 (L) 10/16/2019    PREGUR negative 07/13/2022    PREGSERUM Negative 11/06/2023    RPR Non-Reactive 07/17/2022    HGBA1C 5.0 01/05/2023    EAG 97 01/05/2023       Psychiatric Review of Systems:  Behavior over the last 24 hours:  unchanged.   Sleep: adequate  Appetite: adequate  Medication side effects:  denies  ROS: no complaints, denies shortness of breath or chest pain and all other systems are negative for acute changes    Mental Status Evaluation:    Appearance:  marginal hygiene, dressed in hospital attire   Behavior:  pleasant, cooperative, calm   Speech:  normal rate and volume, fluent, clear   Mood:  anxious   Affect:  constricted   Thought Process:  goal directed, linear   Associations: intact associations   Thought Content:  some paranoia, negative thoughts, intrusive thoughts, ruminations   Perceptual Disturbances: denies auditory or visual hallucinations when asked, does not appear responding to  internal stimuli   Risk Potential: Suicidal ideation - None at present, contracts for safety on the unit, would talk to staff if not feeling safe on the unit  Homicidal ideation - None  Potential for aggression - No   Sensorium:  oriented to person, place, and situation   Memory:  recent memory intact   Consciousness:  alert and awake   Attention/Concentration: attention span and concentration appear shorter than expected for age   Insight:  impaired   Judgment: impaired   Gait/Station: normal gait/station   Motor Activity: no abnormal movements     Progress Toward Goals:   Blanca is progressing towards goals of inpatient psychiatric treatment by continued medication compliance and is attending therapeutic modalities on the milieu. However, the patient continues to require inpatient psychiatric hospitalization for continued medication management and titration to optimize symptom reduction, improve sleep hygiene, and demonstrate adequate self-care.    Risk of Harm to Self:   Nursing Suicide Risk Assessment Last 24 hours: C-SSRS Risk (Since Last Contact)  Calculated C-SSRS Risk Score (Since Last Contact): No Risk Indicated  Current Specific Risk Factors include: diagnosis of mood disorder  Protective Factors: no current suicidal ideation, ability to communicate with staff on the unit, able to contract for safety on the unit, responds to redirection  Based on today's assessment, Blanca presents the following risk of harm to self: low    Risk of Harm to Others:  Nursing Homicide Risk Assessment: Violence Risk to Others: Denies within past 6 months  Current Specific Risk Factors include: poor insight  Protective Factors: no current homicidal ideation, compliant with medications on the unit as ordered, compliant with unit milieu, follows staff redirection  Based on today's assessment, Blanca presents the following risk of harm to others: low    The following interventions are recommended: behavioral checks every 7 minutes,  continued hospitalization on locked unit      Assessment/Plan   Principal Problem:    Schizoaffective disorder, bipolar type (HCC)  Active Problems:    Benign essential hypertension    Edema    Hypothyroidism    MAG (obstructive sleep apnea)    Overactive bladder    Sjogren's syndrome (HCC)    Medical clearance for psychiatric admission    Anxiety    Borderline personality disorder ([facility])    History of pulmonary embolism    Ingrown toenail        Behavioral Health Medications: all current active meds have been reviewed and continue current psychiatric medications.  Current Facility-Administered Medications   Medication Dose Route Frequency Provider Last Rate    aluminum-magnesium hydroxide-simethicone  30 mL Oral Q4H PRN Wendy Anderson, LAURANP      atoMOXetine  40 mg Oral Daily Anatoly Prajapati, DO      buPROPion  450 mg Oral Daily Anatoly Prajapati, DO      cloNIDine  0.1 mg Oral Q12H TASHA Anatoly Prajapati, DO      cyanocobalamin  1,000 mcg Oral Daily Dianna Mcrae, CRNP      cyanocobalamin  1,000 mcg Intramuscular Q30 Days Dianna Mcrae, JHONATAN      ergocalciferol  50,000 Units Oral Weekly Dianna Mcrae, LAURANP      furosemide  20 mg Oral Daily Wendy Anderson, CRNP      hydrOXYzine HCL  25 mg Oral Q6H PRN Max 4/day Virginia Mccullough, CRNP      hydrOXYzine HCL  50 mg Oral Q6H PRN Max 4/day Virginia Mccullough, CRNP      ibuprofen  200 mg Oral Q6H PRN Virginia Mccullough, CRNP      ibuprofen  400 mg Oral Q6H PRN Virginia Mccullough, CRNP      ibuprofen  600 mg Oral Q8H PRN Virginia Mccullough, LAURANP      levothyroxine  125 mcg Oral Early Morning Wendy Anderson, CRNP      loperamide  2 mg Oral Q4H PRN Anatoly Prajapati, DO      LORazepam  1 mg Intramuscular Q6H PRN Max 3/day Virginia Mccullough, CRNP      LORazepam  1 mg Oral Q6H PRN Max 3/day Virginia Mccullough, LAURANP      losartan  100 mg Oral Daily Wendy Anderson, CRNP      melatonin  3 mg Oral HS Frederick Smith MD      naltrexone  50 mg Oral  Daily Anatoly Prajapati DO      OLANZapine  10 mg Oral Q6H PRN Frederick Smith MD      Or    OLANZapine  10 mg Intramuscular Q6H PRN Frederick Smith MD      OLANZapine  5 mg Oral Q6H PRN Frederick Smith MD      Or    OLANZapine  5 mg Intramuscular Q6H PRN Frederick Smith MD      OLANZapine  2.5 mg Oral Q3H PRN Frederick Smith MD      OXcarbazepine  450 mg Oral Q12H [facility] Frederick Smith MD      oxybutynin  5 mg Oral BID JHONATAN Fields      [START ON 1/15/2024] paliperidone  234 mg IM- Deltoid Q4 weeks Frederick Smith MD      pantoprazole  40 mg Oral Early Morning JHONATAN Fields      polyethylene glycol  17 g Oral Daily PRN JHONATAN Anne      saccharomyces boulardii  250 mg Oral BID Anatoly Prajapati DO      senna-docusate sodium  1 tablet Oral Daily PRN JHONATAN Anne      topiramate  200 mg Oral BID Anatoly Prajapati DO      traZODone  50 mg Oral HS PRN JHONATAN Anne         Risks / Benefits of Treatment:  Risks, benefits, and possible side effects of medications explained to patient and patient verbalizes understanding and agreement for treatment.    Counseling / Coordination of Care:  Patient's progress reviewed with nursing staff.  Medications, treatment progress and treatment plan reviewed with patient.  Supportive counseling provided to the patient.    Total floor/unit time spent today 25 minutes. Greater than 50% of total time was spent with the patient and / or family counseling and / or coordination of care. A description of the counseling / coordination of care: medication education, treatment plan, supportive therapy.

## 2023-12-30 NOTE — PLAN OF CARE
Problem: Ineffective Coping  Goal: Identifies ineffective coping skills  Outcome: Progressing  Goal: Identifies healthy coping skills  Outcome: Progressing  Goal: Demonstrates healthy coping skills  Outcome: Progressing  Goal: Participates in unit activities  Description: Interventions:  - Provide therapeutic environment   - Provide required programming   - Redirect inappropriate behaviors   Outcome: Progressing  Goal: Patient/Family participate in treatment and DC plans  Description: Interventions:  - Provide therapeutic environment  Outcome: Progressing  Goal: Patient/Family verbalizes awareness of resources  Outcome: Progressing  Goal: Understands least restrictive measures  Description: Interventions:  - Utilize least restrictive behavior  Outcome: Progressing  Goal: Free from restraint events  Description: - Utilize least restrictive measures   - Provide behavioral interventions   - Redirect inappropriate behaviors   Outcome: Progressing     Problem: Risk for Self Injury/Neglect  Goal: Treatment Goal: Remain safe during length of stay, learn and adopt new coping skills, and be free of self-injurious ideation, impulses and acts at the time of discharge  Outcome: Progressing  Goal: Verbalize thoughts and feelings  Description: Interventions:  - Assess and re-assess patient's lethality and potential for self-injury  - Engage patient in 1:1 interactions, daily, for a minimum of 15 minutes  - Encourage patient to express feelings, fears, frustrations, hopes  - Establish rapport/trust with patient   Outcome: Progressing  Goal: Refrain from harming self  Description: Interventions:  - Monitor patient closely, per order  - Develop a trusting relationship  - Supervise medication ingestion, monitor effects and side effects   Outcome: Progressing  Goal: Attend and participate in unit activities, including therapeutic, recreational, and educational groups  Description: Interventions:  - Provide therapeutic and  educational activities daily, encourage attendance and participation, and document same in the medical record  - Obtain collateral information, encourage visitation and family involvement in care   Outcome: Progressing  Goal: Recognize maladaptive responses and adopt new coping mechanisms  Outcome: Progressing  Goal: Complete daily ADLs, including personal hygiene independently, as able  Description: Interventions:  - Observe, teach, and assist patient with ADLS  - Monitor and promote a balance of rest/activity, with adequate nutrition and elimination  Outcome: Progressing     Problem: Depression  Goal: Treatment Goal: Demonstrate behavioral control of depressive symptoms, verbalize feelings of improved mood/affect, and adopt new coping skills prior to discharge  Outcome: Progressing  Goal: Verbalize thoughts and feelings  Description: Interventions:  - Assess and re-assess patient's level of risk   - Engage patient in 1:1 interactions, daily, for a minimum of 15 minutes   - Encourage patient to express feelings, fears, frustrations, hopes   Outcome: Progressing  Goal: Refrain from harming self  Description: Interventions:  - Monitor patient closely, per order   - Supervise medication ingestion, monitor effects and side effects   Outcome: Progressing  Goal: Refrain from isolation  Description: Interventions:  - Develop a trusting relationship   - Encourage socialization   Outcome: Progressing  Goal: Refrain from self-neglect  Outcome: Progressing     Problem: Anxiety  Goal: Anxiety is at manageable level  Description: Interventions:  - Assess and monitor patient's anxiety level.   - Monitor for signs and symptoms (heart palpitations, chest pain, shortness of breath, headaches, nausea, feeling jumpy, restlessness, irritable, apprehensive).   - Collaborate with interdisciplinary team and initiate plan and interventions as ordered.  - [place] patient to unit/surroundings  - Explain treatment plan  - Encourage  participation in care  - Encourage verbalization of concerns/fears  - Identify coping mechanisms  - Assist in developing anxiety-reducing skills  - Administer/offer alternative therapies  - Limit or eliminate stimulants  Outcome: Progressing

## 2023-12-31 PROCEDURE — 99232 SBSQ HOSP IP/OBS MODERATE 35: CPT

## 2023-12-31 RX ADMIN — CYANOCOBALAMIN TAB 1000 MCG 1000 MCG: 1000 TAB at 08:14

## 2023-12-31 RX ADMIN — MELATONIN TAB 3 MG 3 MG: 3 TAB at 21:34

## 2023-12-31 RX ADMIN — CLONIDINE HYDROCHLORIDE 0.1 MG: 0.1 TABLET ORAL at 21:34

## 2023-12-31 RX ADMIN — TRAZODONE HYDROCHLORIDE 50 MG: 50 TABLET ORAL at 01:41

## 2023-12-31 RX ADMIN — LORAZEPAM 1 MG: 1 TABLET ORAL at 09:11

## 2023-12-31 RX ADMIN — OXCARBAZEPINE 450 MG: 150 TABLET, FILM COATED ORAL at 21:34

## 2023-12-31 RX ADMIN — Medication 250 MG: at 08:14

## 2023-12-31 RX ADMIN — FUROSEMIDE 20 MG: 20 TABLET ORAL at 08:14

## 2023-12-31 RX ADMIN — CLONIDINE HYDROCHLORIDE 0.1 MG: 0.1 TABLET ORAL at 08:14

## 2023-12-31 RX ADMIN — LEVOTHYROXINE SODIUM 125 MCG: 125 TABLET ORAL at 05:50

## 2023-12-31 RX ADMIN — IBUPROFEN 600 MG: 600 TABLET ORAL at 03:45

## 2023-12-31 RX ADMIN — Medication 250 MG: at 17:04

## 2023-12-31 RX ADMIN — NALTREXONE HYDROCHLORIDE 50 MG: 50 TABLET, FILM COATED ORAL at 08:14

## 2023-12-31 RX ADMIN — ATOMOXETINE 40 MG: 40 CAPSULE ORAL at 08:14

## 2023-12-31 RX ADMIN — BUPROPION HYDROCHLORIDE 450 MG: 300 TABLET, EXTENDED RELEASE ORAL at 08:14

## 2023-12-31 RX ADMIN — TOPIRAMATE 200 MG: 100 TABLET, FILM COATED ORAL at 08:14

## 2023-12-31 RX ADMIN — OXYBUTYNIN CHLORIDE 5 MG: 5 TABLET ORAL at 17:04

## 2023-12-31 RX ADMIN — PANTOPRAZOLE SODIUM 40 MG: 40 TABLET, DELAYED RELEASE ORAL at 05:50

## 2023-12-31 RX ADMIN — TOPIRAMATE 200 MG: 100 TABLET, FILM COATED ORAL at 17:04

## 2023-12-31 RX ADMIN — LOSARTAN POTASSIUM 100 MG: 50 TABLET, FILM COATED ORAL at 08:14

## 2023-12-31 RX ADMIN — OXYBUTYNIN CHLORIDE 5 MG: 5 TABLET ORAL at 08:14

## 2023-12-31 RX ADMIN — OXCARBAZEPINE 450 MG: 150 TABLET, FILM COATED ORAL at 08:14

## 2023-12-31 NOTE — PLAN OF CARE
Problem: Depression  Goal: Treatment Goal: Demonstrate behavioral control of depressive symptoms, verbalize feelings of improved mood/affect, and adopt new coping skills prior to discharge  Outcome: Progressing  Goal: Verbalize thoughts and feelings  Description: Interventions:  - Assess and re-assess patient's level of risk   - Engage patient in 1:1 interactions, daily, for a minimum of 15 minutes   - Encourage patient to express feelings, fears, frustrations, hopes   Outcome: Progressing  Goal: Refrain from harming self  Description: Interventions:  - Monitor patient closely, per order   - Supervise medication ingestion, monitor effects and side effects   Outcome: Progressing  Goal: Refrain from isolation  Description: Interventions:  - Develop a trusting relationship   - Encourage socialization   Outcome: Progressing  Goal: Refrain from self-neglect  Outcome: Progressing  Goal: Complete daily ADLs, including personal hygiene independently, as able  Description: Interventions:  - Observe, teach, and assist patient with ADLS  -  Monitor and promote a balance of rest/activity, with adequate nutrition and elimination   Outcome: Progressing     Problem: Anxiety  Goal: Anxiety is at manageable level  Description: Interventions:  - Assess and monitor patient's anxiety level.   - Monitor for signs and symptoms (heart palpitations, chest pain, shortness of breath, headaches, nausea, feeling jumpy, restlessness, irritable, apprehensive).   - Collaborate with interdisciplinary team and initiate plan and interventions as ordered.  - [place] patient to unit/surroundings  - Explain treatment plan  - Encourage participation in care  - Encourage verbalization of concerns/fears  - Identify coping mechanisms  - Assist in developing anxiety-reducing skills  - Administer/offer alternative therapies  - Limit or eliminate stimulants  Outcome: Progressing     Problem: Risk for Violence/Aggression Toward Others  Goal: Treatment Goal:  Refrain from acts of violence/aggression during length of stay, and demonstrate improved impulse control at the time of discharge  Outcome: Progressing  Goal: Refrain from harming others  Outcome: Progressing  Goal: Refrain from destructive acts on the environment or property  Outcome: Progressing  Goal: Control angry outbursts  Description: Interventions:  - Monitor patient closely, per order  - Ensure early verbal de-escalation  - Monitor prn medication needs  - Set reasonable/therapeutic limits, outline behavioral expectations, and consequences   - Provide a non-threatening milieu, utilizing the least restrictive interventions   Outcome: Progressing

## 2023-12-31 NOTE — NURSING NOTE
Patient is withdrawn to room during shift. Denies all psych s/s. Cooperative with care. Medication compliant. Patient encouraged to make needs known. Safety checks ongoing.

## 2023-12-31 NOTE — PROGRESS NOTES
"Progress Note - Behavioral Health   Blanca Mason 53 y.o. female MRN: [id number]  Unit/Bed#: [facility] 651-02 Encounter: [id number]    Patient was seen today for continuation of care, records reviewed and patient was discussed with the morning case review team.  Per staff, Blanca with poor sleep last night, she received PRN trazodone which was not effective.     Blanca was seen today for psychiatric follow-up.  On assessment today, Blanca was seen in the group room away from peers.  Blanca reports poor sleep last night but her mood is \"okay\" today.  She states she will take a nap today if she feels increasing irritability.  Her thought process remains mostly negative. We discussed increase in PRN trazodone for insomnia, Blanca not agreeable at this time as she is concerned about daytime sedation.   After the interview, Blanca pulled this writer aside in the hallway, she stated she tried to nap but her roommate was being loud.  Blanca became tearful and was dismissive when discussing options she has for rest.  She was encouraged to use PRN at this time as she was become more anxious and agitated.  Nursing administered PRN ativan as ordered.  Blanca was seen a bit later in the hallway and reports feeling \"a little\" better since PRN.       Blanca denies acute suicidal/self-harm ideation/intent/plan upon direct inquiry at this time.  Blanca remains labile and irritable aggression or outbursts noted on exam or in report today.  Blanca also denies HI/AH/VH, and does not appear overtly manic nor does she present as internally preoccupied.  No overt delusions or paranoia are verbalized.  Blanca remains adherent to her current psychotropic medication regimen and denies any side effects from medications, as well as none noted on exam.    Recommended Treatment: Treatment plan and medication changes discussed and per the attending physician the plan is:    1.Continue with group therapy, milieu therapy and occupational therapy  2.Behavioral Health checks " every 7 minutes  3.Continue frequent safety checks and vitals per unit protocol  4.Continue with SL[facility] medical management as indicated  5.Continue with current medication regimen, referral to [facility] pending  6.Will review labs in the a.m.  7.Disposition Planning: Discharge planning and efforts remain ongoing    Vitals:  Vitals:    12/31/23 0747   BP: 131/73   Pulse: 90   Resp: 16   Temp: (!) 97 °F (36.1 °C)   SpO2: 93%       Laboratory Results:  I have personally reviewed all pertinent laboratory/tests results.  Most Recent Labs:   Lab Results   Component Value Date    WBC 8.96 12/02/2023    RBC 4.34 12/02/2023    HGB 12.9 12/02/2023    HCT 40.5 12/02/2023     12/02/2023    RDW 14.2 12/02/2023    TOTANEUTABS 4.73 11/19/2023    NEUTROABS 5.77 12/02/2023    SODIUM 139 12/02/2023    K 3.8 12/02/2023     12/02/2023    CO2 21 12/02/2023    BUN 13 12/02/2023    CREATININE 0.62 12/02/2023    GLUC 107 12/02/2023    GLUF 107 (H) 12/02/2023    CALCIUM 8.7 12/02/2023    AST 13 12/02/2023    ALT 13 12/02/2023    ALKPHOS 55 12/02/2023    TP 6.0 (L) 12/02/2023    ALB 3.5 12/02/2023    TBILI 0.25 12/02/2023    CHOLESTEROL 234 (H) 03/09/2023    HDL 71 03/09/2023    TRIG 153 (H) 03/09/2023    LDLCALC 132 (H) 03/09/2023    NONHDLC 163 03/09/2023    VALPROICTOT 23 (L) 11/10/2023    AMMONIA 63 11/06/2023    IYH3MTIRNTNY 4.324 12/02/2023    FREET4 0.81 11/06/2023    T3FREE 2.27 (L) 10/16/2019    PREGUR negative 07/13/2022    PREGSERUM Negative 11/06/2023    RPR Non-Reactive 07/17/2022    HGBA1C 5.0 01/05/2023    EAG 97 01/05/2023       Psychiatric Review of Systems:  Behavior over the last 24 hours:  unchanged.   Sleep: adequate  Appetite: adequate  Medication side effects:  denies  ROS: no complaints, denies shortness of breath or chest pain and all other systems are negative for acute changes    Mental Status Evaluation:    Appearance:  marginal hygiene, dressed in hospital attire   Behavior:  pleasant, cooperative, calm    Speech:  normal rate and volume, fluent, clear   Mood:  anxious, labile, irritable   Affect:  constricted   Thought Process:  linear, negative thinking   Associations: intact associations   Thought Content:  some paranoia, negative thoughts, intrusive thoughts, ruminations   Perceptual Disturbances: denies auditory or visual hallucinations when asked, does not appear responding to internal stimuli   Risk Potential: Suicidal ideation - None at present, contracts for safety on the unit, would talk to staff if not feeling safe on the unit  Homicidal ideation - None  Potential for aggression - No   Sensorium:  oriented to person, place, and situation   Memory:  recent memory intact   Consciousness:  alert and awake   Attention/Concentration: attention span and concentration appear shorter than expected for age   Insight:  impaired   Judgment: impaired   Gait/Station: normal gait/station   Motor Activity: no abnormal movements     Progress Toward Goals:   Blanca is progressing towards goals of inpatient psychiatric treatment by continued medication compliance and is attending therapeutic modalities on the milieu. However, the patient continues to require inpatient psychiatric hospitalization for continued medication management and titration to optimize symptom reduction, improve sleep hygiene, and demonstrate adequate self-care.    Risk of Harm to Self:   Nursing Suicide Risk Assessment Last 24 hours: C-SSRS Risk (Since Last Contact)  Calculated C-SSRS Risk Score (Since Last Contact): No Risk Indicated  Current Specific Risk Factors include: diagnosis of mood disorder  Protective Factors: no current suicidal ideation, ability to communicate with staff on the unit, able to contract for safety on the unit, responds to redirection  Based on today's assessment, Blanca presents the following risk of harm to self: low    Risk of Harm to Others:  Nursing Homicide Risk Assessment: Violence Risk to Others: Denies within past 6  months  Current Specific Risk Factors include: poor insight  Protective Factors: no current homicidal ideation, compliant with medications on the unit as ordered, compliant with unit milieu, follows staff redirection  Based on today's assessment, Blanca presents the following risk of harm to others: low    The following interventions are recommended: behavioral checks every 7 minutes, continued hospitalization on locked unit      Assessment/Plan   Principal Problem:    Schizoaffective disorder, bipolar type (HCC)  Active Problems:    Benign essential hypertension    Edema    Hypothyroidism    MAG (obstructive sleep apnea)    Overactive bladder    Sjogren's syndrome ([facility])    Medical clearance for psychiatric admission    Anxiety    Borderline personality disorder ([facility])    History of pulmonary embolism    Ingrown toenail        Behavioral Health Medications: all current active meds have been reviewed and continue current psychiatric medications.  Current Facility-Administered Medications   Medication Dose Route Frequency Provider Last Rate    aluminum-magnesium hydroxide-simethicone  30 mL Oral Q4H PRN Wendy Anderson, JHONATAN      atoMOXetine  40 mg Oral Daily Anatoly Prajapati, DO      buPROPion  450 mg Oral Daily Anatoly Prajapati, DO      cloNIDine  0.1 mg Oral Q12H [facility] Anatoly Prajapati, DO      cyanocobalamin  1,000 mcg Oral Daily Dianna Mcrae, CRNP      cyanocobalamin  1,000 mcg Intramuscular Q30 Days JHONATAN Berg      ergocalciferol  50,000 Units Oral Weekly JHONATAN Berg      furosemide  20 mg Oral Daily Wendy Anderson, LAURANP      hydrOXYzine HCL  25 mg Oral Q6H PRN Max 4/day Virginia Mccullough, CRNP      hydrOXYzine HCL  50 mg Oral Q6H PRN Max 4/day Virginia Mccullough, CRNP      ibuprofen  200 mg Oral Q6H PRN Virginia Mccullough, CRNP      ibuprofen  400 mg Oral Q6H PRN Virginia Mccullough, CRNP      ibuprofen  600 mg Oral Q8H PRN Virginia Mccullough, CRNP      levothyroxine  125 mcg  Oral Early Morning JHONATAN Fields      loperamide  2 mg Oral Q4H PRN Anatoly Prajapati,       LORazepam  1 mg Intramuscular Q6H PRN Max 3/day JHONATAN Anne      LORazepam  1 mg Oral Q6H PRN Max 3/day JHONATAN Anne      losartan  100 mg Oral Daily JHONATAN Fields      melatonin  3 mg Oral HS Frederick Smith MD      naltrexone  50 mg Oral Daily Anatoly Prajapati,       OLANZapine  10 mg Oral Q6H PRN Frederick Smith MD      Or    OLANZapine  10 mg Intramuscular Q6H PRN Frederick Smith MD      OLANZapine  5 mg Oral Q6H PRN Frederick Smith MD      Or    OLANZapine  5 mg Intramuscular Q6H PRN Frederick Smith MD      OLANZapine  2.5 mg Oral Q3H PRN Frederick Smith MD      OXcarbazepine  450 mg Oral Q12H [facility] Frederick Smith MD      oxybutynin  5 mg Oral BID JHONATAN Fields      [START ON 1/15/2024] paliperidone  234 mg IM- Deltoid Q4 weeks Frederick Smith MD      pantoprazole  40 mg Oral Early Morning JHONATAN Fields      polyethylene glycol  17 g Oral Daily PRN JHONATAN Anne      saccharomyces boulardii  250 mg Oral BID Anatoly Prjaapati DO      senna-docusate sodium  1 tablet Oral Daily PRN JHONATAN Anne      topiramate  200 mg Oral BID Anatoly Prajapati DO      traZODone  50 mg Oral HS PRN JHONATAN Anne         Risks / Benefits of Treatment:  Risks, benefits, and possible side effects of medications explained to patient and patient verbalizes understanding and agreement for treatment.    Counseling / Coordination of Care:  Patient's progress reviewed with nursing staff.  Medications, treatment progress and treatment plan reviewed with patient.  Supportive counseling provided to the patient.    Total floor/unit time spent today 25 minutes. Greater than 50% of total time was spent with the patient and / or family counseling and / or coordination of care. A description of the counseling / coordination of care: medication education,  treatment plan, supportive therapy.

## 2023-12-31 NOTE — NURSING NOTE
Patient c/o of headache and requested prn. Pain rating is 8/10. Prn ibuprofen 600 mg was administered at 0345

## 2023-12-31 NOTE — NURSING NOTE
PRN Ativan effective. Pt social in dayroom w/ peers; smiling and watching TV. Pt attempted to nap, but describes being unable to sleep.

## 2023-12-31 NOTE — NURSING NOTE
"Pt becomes anxious after speaking w/ NP. Describes poor sleep. Frustrated w/ roommate. Describes roommate as keeping her up at night, and \"slamming drawers and making noise\" this morning while trying to nap. Pt tearful and covering face w/ hands. Selectively mute w/ staff. Encouraged to utilize coping skills in order to remain controlled in mood and behavior. 1 mg Ativan administered for a minaya of 25. Will monitor for effectiveness and cont to offer encouragement as needed.   "

## 2023-12-31 NOTE — PROGRESS NOTES
"Progress Note - Behavioral Health   Blanca Mason 53 y.o. female MRN: [id number]  Unit/Bed#: [facility] 651-02 Encounter: [id number]      Behavior over the last 24 hours:  unchanged    Subjective: I saw Blanca for follow up and continuation of care. I have reviewed the chart and discussed progress with the nursing staff. Patient is calm, cooperative, visible and social. She can be labile and with somatic complaints. Denies psych symptoms today. She appears to have slept well last night without PRN. She is medication and meal compliant. She is attending groups. She remains in good behavorial control. PRNs in the last 24 hours include: Motrin 600 mg, Ativan 1 mg.    On assessment, Blanca is seen in the group room. She is \"okay\" today but endorses frustration about being admitted for the holidays. She is working on better mood control and finds PRNs helpful. She denies overt depression, anxiety, SI, HI, plan, intent or self-injurious behaviors. Blanca does not voice any paranoia or delusions, denies A/VH and does not appear to be responding to internal stimuli. She reports some daytimes tiredness as a side effect to her medication.       Psychiatric Review of Systems:  Sleep: insomnia reported  Appetite: normal  Medication side effects: Yes - daytime tiredness    ROS: no complaints, all other systems are negative    Mental Status Evaluation:    Appearance:  age appropriate, casually dressed, dressed appropriately, adequate grooming, overweight, no distress   Behavior:  cooperative, calm, good eye contact   Speech:  normal rate, normal volume, normal pitch   Mood:  \"Okay\"   Affect:  labile   Thought Process:  logical, goal directed   Thought Content:  no overt delusions, ruminating thoughts, no overt paranoia noted on exam   Perceptual Disturbances: no auditory hallucinations, no visual hallucinations, does not appear responding to internal stimuli   Risk Potential: Suicidal ideation - None at present  Homicidal ideation - None " at present  Potential for aggression - Not at present   Memory:  recent and remote memory grossly intact   Consciousness:  alert and awake   Attention: attention span and concentration are age appropriate   Insight:  poor   Judgment: fair   Gait/Station: normal gait/station   Motor Activity: no abnormal movements     Progress Toward Goals: no significant improvement today, placement pending. No significant changes in behaviors or clinical status over the last 24 hours.    Discharge Disposition: referral to [facility] pending    Assessment/Plan   Principal Problem:    Schizoaffective disorder, bipolar type ([facility])  Active Problems:    Benign essential hypertension    Edema    Hypothyroidism    MAG (obstructive sleep apnea)    Overactive bladder    Sjogren's syndrome ([facility])    Medical clearance for psychiatric admission    Anxiety    Borderline personality disorder ([facility])    History of pulmonary embolism    Ingrown toenail      Treatment Plan:  Continue with group therapy, individual therapy and goals for admission  Behavioral Health checks every 7 minutes for safety  Observe progress over weekend  No changes, continue current medications:      Current Facility-Administered Medications   Medication Dose Route Frequency Provider Last Rate    aluminum-magnesium hydroxide-simethicone  30 mL Oral Q4H PRN JHONATAN Fields      atoMOXetine  40 mg Oral Daily Anatoly Prajapati, DO      buPROPion  450 mg Oral Daily Anatoly Prajapati, DO      cloNIDine  0.1 mg Oral Q12H [facility] Anatoly Prajapati,       cyanocobalamin  1,000 mcg Oral Daily JHONATAN Berg      cyanocobalamin  1,000 mcg Intramuscular Q30 Days JHONATAN Berg      ergocalciferol  50,000 Units Oral Weekly JHONATAN Berg      furosemide  20 mg Oral Daily JHONATAN Fields      hydrOXYzine HCL  25 mg Oral Q6H PRN Max 4/day JHONATAN Anne      hydrOXYzine HCL  50 mg Oral Q6H PRN Max 4/day JHONATAN Anne       "ibuprofen  200 mg Oral Q6H PRN Virginia Mccullough, JHONATAN      ibuprofen  400 mg Oral Q6H PRN Virginia Mccullough, CRELKIN      ibuprofen  600 mg Oral Q8H PRN Virginia Mccullough, JHONATAN      levothyroxine  125 mcg Oral Early Morning Wendy Anderson, LAURANP      loperamide  2 mg Oral Q4H PRN Anatoly Prajapati, DO      LORazepam  1 mg Intramuscular Q6H PRN Max 3/day Virginia Mccullough, JHONATAN      LORazepam  1 mg Oral Q6H PRN Max 3/day Virginia Mccullough, JHONATAN      losartan  100 mg Oral Daily Wendy Anderson, LAURANP      melatonin  3 mg Oral HS Frederick Smith MD      naltrexone  50 mg Oral Daily Anatoly Prajapati, DO      OLANZapine  10 mg Oral Q6H PRN Frederick Smith MD      Or    OLANZapine  10 mg Intramuscular Q6H PRN Frederick Smith MD      OLANZapine  5 mg Oral Q6H PRN Frederick Smith MD      Or    OLANZapine  5 mg Intramuscular Q6H PRN Frederick Smith MD      OLANZapine  2.5 mg Oral Q3H PRN Frederick Smith MD      OXcarbazepine  450 mg Oral Q12H [facility] Frederick Smith MD      oxybutynin  5 mg Oral BID JHONATAN Fields      [START ON 1/15/2024] paliperidone  234 mg IM- Deltoid Q4 weeks Frederick Smith MD      pantoprazole  40 mg Oral Early Morning JHONATAN Fields      polyethylene glycol  17 g Oral Daily PRN JHONATAN Anne      saccharomyces boulardii  250 mg Oral BID Anatoly Prajapati, DO      senna-docusate sodium  1 tablet Oral Daily PRN JHONATAN Anne      topiramate  200 mg Oral BID Anatoly Prajapati, DO      traZODone  50 mg Oral HS PRN JHONATAN Anne         Vitals:  Vitals:    01/01/24 0739   BP: 133/71   Pulse: 93   Resp: 17   Temp: 98 °F (36.7 °C)   SpO2: 93%       Laboratory Results:  I have personally reviewed all pertinent laboratory/tests results.  Labs in last 72 hours: No results for input(s): \"WBC\", \"RBC\", \"HGB\", \"HCT\", \"PLT\", \"RDW\", \"TOTANEUTABS\", \"NEUTROABS\", \"SODIUM\", \"K\", \"CL\", \"CO2\", \"BUN\", \"CREATININE\", \"GLUC\", \"GLUF\", \"CALCIUM\", \"AST\", \"ALT\", \"ALKPHOS\", \"TP\", \"ALB\", " "\"TBILI\", \"CHOLESTEROL\", \"HDL\", \"TRIG\", \"LDLCALC\", \"VALPROICTOT\", \"CARBAMAZEPIN\", \"LITHIUM\", \"AMMONIA\", \"[id number]\", \"FREET4\", \"T3FREE\", \"PREGTESTUR\", \"PREGSERUM\", \"HCG\", \"HCGQUANT\", \"RPR\" in the last 72 hours.      Risks / Benefits of Treatment:    Risks, benefits, and possible side effects of medications explained to patient and patient verbalizes understanding and agreement for treatment.    Counseling / Coordination of Care:    Total floor / unit time spent today 25 minutes. Greater than 50% of total time was spent with the patient and / or family counseling and / or coordination of care. A description of the counseling / coordination of care:   Patient's presentation on admission and proposed treatment plan discussed with treatment team.  Diagnosis, medication changes and treatment plan reviewed with patient.  Supportive therapy provided to patient.    This note has been constructed using a voice recognition system. There may be translation, syntax, or grammatical errors. If you have any questions, please contact the dictating author.  JHONATAN Melchor  01/01/24    "

## 2024-01-01 PROCEDURE — 99232 SBSQ HOSP IP/OBS MODERATE 35: CPT

## 2024-01-01 RX ADMIN — CYANOCOBALAMIN TAB 1000 MCG 1000 MCG: 1000 TAB at 08:14

## 2024-01-01 RX ADMIN — BUPROPION HYDROCHLORIDE 450 MG: 300 TABLET, EXTENDED RELEASE ORAL at 08:14

## 2024-01-01 RX ADMIN — OXCARBAZEPINE 450 MG: 150 TABLET, FILM COATED ORAL at 21:17

## 2024-01-01 RX ADMIN — PANTOPRAZOLE SODIUM 40 MG: 40 TABLET, DELAYED RELEASE ORAL at 05:46

## 2024-01-01 RX ADMIN — OXYBUTYNIN CHLORIDE 5 MG: 5 TABLET ORAL at 08:14

## 2024-01-01 RX ADMIN — LOSARTAN POTASSIUM 100 MG: 50 TABLET, FILM COATED ORAL at 08:14

## 2024-01-01 RX ADMIN — TOPIRAMATE 200 MG: 100 TABLET, FILM COATED ORAL at 17:05

## 2024-01-01 RX ADMIN — ATOMOXETINE 40 MG: 40 CAPSULE ORAL at 08:14

## 2024-01-01 RX ADMIN — LEVOTHYROXINE SODIUM 125 MCG: 125 TABLET ORAL at 05:46

## 2024-01-01 RX ADMIN — NALTREXONE HYDROCHLORIDE 50 MG: 50 TABLET, FILM COATED ORAL at 08:14

## 2024-01-01 RX ADMIN — OXYBUTYNIN CHLORIDE 5 MG: 5 TABLET ORAL at 17:05

## 2024-01-01 RX ADMIN — TOPIRAMATE 200 MG: 100 TABLET, FILM COATED ORAL at 08:14

## 2024-01-01 RX ADMIN — Medication 250 MG: at 08:14

## 2024-01-01 RX ADMIN — FUROSEMIDE 20 MG: 20 TABLET ORAL at 08:14

## 2024-01-01 RX ADMIN — CLONIDINE HYDROCHLORIDE 0.1 MG: 0.1 TABLET ORAL at 21:17

## 2024-01-01 RX ADMIN — MELATONIN TAB 3 MG 3 MG: 3 TAB at 21:17

## 2024-01-01 RX ADMIN — CLONIDINE HYDROCHLORIDE 0.1 MG: 0.1 TABLET ORAL at 08:14

## 2024-01-01 RX ADMIN — ERGOCALCIFEROL 50000 UNITS: 1.25 CAPSULE ORAL at 08:14

## 2024-01-01 RX ADMIN — IBUPROFEN 400 MG: 400 TABLET ORAL at 11:23

## 2024-01-01 RX ADMIN — OXCARBAZEPINE 450 MG: 150 TABLET, FILM COATED ORAL at 08:14

## 2024-01-01 RX ADMIN — Medication 250 MG: at 17:05

## 2024-01-01 NOTE — NURSING NOTE
Pt calm and cooperative. Slightly irritable edge at times. Focused on the TV. Blaming others for monopolizing TV when in reality Pt often spends time choosing programs  Redirection provided. Tolerated well. Remains controlled in mood and behavior. No need to utilize PRN medications. +meals and meds. Denies SI/HI. Denies psychosis. Visible and social. Describes improved sleep last night due to earplugs. Support provided. Q7's maintained.

## 2024-01-01 NOTE — NURSING NOTE
Patient is calm and cooperative with care. Denies all psych s/s. Patient is visible in the dayroom watching television with peers. Medication compliant. Patient is able to make her needs known. Safety checks ongoing.

## 2024-01-01 NOTE — PLAN OF CARE
Problem: Depression  Goal: Treatment Goal: Demonstrate behavioral control of depressive symptoms, verbalize feelings of improved mood/affect, and adopt new coping skills prior to discharge  Outcome: Progressing  Goal: Verbalize thoughts and feelings  Description: Interventions:  - Assess and re-assess patient's level of risk   - Engage patient in 1:1 interactions, daily, for a minimum of 15 minutes   - Encourage patient to express feelings, fears, frustrations, hopes   Outcome: Progressing  Goal: Refrain from harming self  Description: Interventions:  - Monitor patient closely, per order   - Supervise medication ingestion, monitor effects and side effects   Outcome: Progressing  Goal: Refrain from isolation  Description: Interventions:  - Develop a trusting relationship   - Encourage socialization   Outcome: Progressing  Goal: Refrain from self-neglect  Outcome: Progressing  Goal: Complete daily ADLs, including personal hygiene independently, as able  Description: Interventions:  - Observe, teach, and assist patient with ADLS  -  Monitor and promote a balance of rest/activity, with adequate nutrition and elimination   Outcome: Progressing     Problem: Anxiety  Goal: Anxiety is at manageable level  Description: Interventions:  - Assess and monitor patient's anxiety level.   - Monitor for signs and symptoms (heart palpitations, chest pain, shortness of breath, headaches, nausea, feeling jumpy, restlessness, irritable, apprehensive).   - Collaborate with interdisciplinary team and initiate plan and interventions as ordered.  - [place] patient to unit/surroundings  - Explain treatment plan  - Encourage participation in care  - Encourage verbalization of concerns/fears  - Identify coping mechanisms  - Assist in developing anxiety-reducing skills  - Administer/offer alternative therapies  - Limit or eliminate stimulants  Outcome: Progressing     Problem: Risk for Violence/Aggression Toward Others  Goal: Treatment Goal:  Refrain from acts of violence/aggression during length of stay, and demonstrate improved impulse control at the time of discharge  Outcome: Progressing  Goal: Refrain from harming others  Outcome: Progressing  Goal: Refrain from destructive acts on the environment or property  Outcome: Progressing  Goal: Control angry outbursts  Description: Interventions:  - Monitor patient closely, per order  - Ensure early verbal de-escalation  - Monitor prn medication needs  - Set reasonable/therapeutic limits, outline behavioral expectations, and consequences   - Provide a non-threatening milieu, utilizing the least restrictive interventions   Outcome: Progressing  Goal: Identify appropriate positive anger management techniques  Description: Interventions:  - Offer anger management and coping skills groups   - Staff will provide positive feedback for appropriate anger control  Outcome: Progressing

## 2024-01-02 LAB
FLUAV RNA RESP QL NAA+PROBE: NEGATIVE
FLUBV RNA RESP QL NAA+PROBE: NEGATIVE
INR PPP: 1.08 (ref 0.84–1.19)
PROTHROMBIN TIME: 14.3 SECONDS (ref 11.6–14.5)
RSV RNA RESP QL NAA+PROBE: NEGATIVE
SARS-COV-2 RNA RESP QL NAA+PROBE: POSITIVE

## 2024-01-02 PROCEDURE — 0241U HB NFCT DS VIR RESP RNA 4 TRGT: CPT | Performed by: NURSE PRACTITIONER

## 2024-01-02 PROCEDURE — 85610 PROTHROMBIN TIME: CPT | Performed by: NURSE PRACTITIONER

## 2024-01-02 PROCEDURE — 99232 SBSQ HOSP IP/OBS MODERATE 35: CPT | Performed by: STUDENT IN AN ORGANIZED HEALTH CARE EDUCATION/TRAINING PROGRAM

## 2024-01-02 RX ORDER — WARFARIN SODIUM 2 MG/1
6 TABLET ORAL
Status: COMPLETED | OUTPATIENT
Start: 2024-01-02 | End: 2024-01-06

## 2024-01-02 RX ORDER — CLONIDINE HYDROCHLORIDE 0.1 MG/1
0.2 TABLET ORAL EVERY 12 HOURS SCHEDULED
Status: DISCONTINUED | OUTPATIENT
Start: 2024-01-02 | End: 2024-02-01 | Stop reason: HOSPADM

## 2024-01-02 RX ADMIN — ATOMOXETINE 40 MG: 40 CAPSULE ORAL at 08:22

## 2024-01-02 RX ADMIN — NALTREXONE HYDROCHLORIDE 50 MG: 50 TABLET, FILM COATED ORAL at 08:22

## 2024-01-02 RX ADMIN — Medication 250 MG: at 08:22

## 2024-01-02 RX ADMIN — OXCARBAZEPINE 450 MG: 150 TABLET, FILM COATED ORAL at 21:20

## 2024-01-02 RX ADMIN — OXCARBAZEPINE 450 MG: 150 TABLET, FILM COATED ORAL at 08:22

## 2024-01-02 RX ADMIN — FUROSEMIDE 20 MG: 20 TABLET ORAL at 08:22

## 2024-01-02 RX ADMIN — CYANOCOBALAMIN TAB 1000 MCG 1000 MCG: 1000 TAB at 08:22

## 2024-01-02 RX ADMIN — BUPROPION HYDROCHLORIDE 450 MG: 300 TABLET, EXTENDED RELEASE ORAL at 08:22

## 2024-01-02 RX ADMIN — MELATONIN TAB 3 MG 3 MG: 3 TAB at 21:20

## 2024-01-02 RX ADMIN — PANTOPRAZOLE SODIUM 40 MG: 40 TABLET, DELAYED RELEASE ORAL at 05:28

## 2024-01-02 RX ADMIN — LOSARTAN POTASSIUM 100 MG: 50 TABLET, FILM COATED ORAL at 08:22

## 2024-01-02 RX ADMIN — CLONIDINE HYDROCHLORIDE 0.2 MG: 0.1 TABLET ORAL at 21:20

## 2024-01-02 RX ADMIN — TOPIRAMATE 200 MG: 100 TABLET, FILM COATED ORAL at 08:22

## 2024-01-02 RX ADMIN — CLONIDINE HYDROCHLORIDE 0.1 MG: 0.1 TABLET ORAL at 08:22

## 2024-01-02 RX ADMIN — LORAZEPAM 1 MG: 1 TABLET ORAL at 10:13

## 2024-01-02 RX ADMIN — WARFARIN SODIUM 6 MG: 2 TABLET ORAL at 17:10

## 2024-01-02 RX ADMIN — Medication 250 MG: at 17:10

## 2024-01-02 RX ADMIN — OXYBUTYNIN CHLORIDE 5 MG: 5 TABLET ORAL at 17:10

## 2024-01-02 RX ADMIN — TOPIRAMATE 200 MG: 100 TABLET, FILM COATED ORAL at 17:10

## 2024-01-02 RX ADMIN — HYDROXYZINE HYDROCHLORIDE 50 MG: 50 TABLET, FILM COATED ORAL at 05:28

## 2024-01-02 RX ADMIN — LEVOTHYROXINE SODIUM 125 MCG: 125 TABLET ORAL at 05:28

## 2024-01-02 RX ADMIN — OXYBUTYNIN CHLORIDE 5 MG: 5 TABLET ORAL at 08:22

## 2024-01-02 RX ADMIN — IBUPROFEN 600 MG: 600 TABLET ORAL at 11:37

## 2024-01-02 NOTE — NURSING NOTE
"Pt is choosing to stay isolated in her room this morning and says it's because of a male peer \"in her business\". She is med/meal compliant. Pt is reporting body aches and a sore throat, Covid/flu/RSV swab in process. She reports feeling anxious about possibly going to [facility], emotional support provided. Pt currently coloring in her room, denies any unmet needs.   "

## 2024-01-02 NOTE — NURSING NOTE
Pt given 1mg of PRN Ativan  1013. Pt crying after speaking with her psychiatrist and was in emotional distress.

## 2024-01-02 NOTE — PROGRESS NOTES
Progress Note - Behavioral Health   Blanca Mason 53 y.o. female MRN: [id number]  Unit/Bed#: [facility] 651-02 Encounter: [id number]    Assessment/Plan   Principal Problem:    Schizoaffective disorder, bipolar type (HCC)  Active Problems:    Benign essential hypertension    Edema    Hypothyroidism    MAG (obstructive sleep apnea)    Overactive bladder    Sjogren's syndrome (HCC)    Medical clearance for psychiatric admission    Anxiety    Borderline personality disorder (HCC)    History of pulmonary embolism    Ingrown toenail      Recommended Treatment:   Increase clonidine to 0.2 mg twice daily for ongoing impulsivity and mood instability    Continue with group therapy, milieu therapy and occupational therapy.    Continue frequent safety checks and vitals per unit protocol.  Case discussed with treatment team.  Risks, benefits and possible side effects of Medications: Risks, benefits, and possible side effects of medications have been explained to the patient, who verbalizes understanding    ------------------------------------------------------------    Subjective: Per nursing report, Blanca has been cooperative on the unit and compliant with medications.  Today, Blanca is consenting for safety on the unit. She reports still feeling very anxious and overall upset.  As needed Ativan given at 1013 due to ongoing crying spells as well as labile mood.  Patient continues to have low frustration/resilience level.  Clonidine 0.1 mg twice daily to be increased to 0.2 mg due to ongoing impulsivity, and mood instability.  No SI/HI.  No AVH.  Discharge disposition ongoing with patient needing referral to E[facility] due to elevated suicidality in the community.     Progress Toward Goals: slow improvement    Psychiatric Review of Systems:  Behavior over the last 24 hours: unchanged  Sleep: normal  Appetite: adequate  Medication side effects: none verbalized  ROS: Complete review of systems is negative except as noted above.    Vital signs  in last 24 hours:  Temp:  [97 °F (36.1 °C)-97.6 °F (36.4 °C)] 97 °F (36.1 °C)  HR:  [82-94] 82  Resp:  [16] 16  BP: (104-136)/(65-72) 133/72    Mental Status Exam:  Appearance:  alert and appropriate grooming and hygiene   Behavior:  calm, cooperative, and laying in bed   Motor: no abnormal movements and normal gait and balance   Speech:  spontaneous and coherent   Mood:  irritable and labile   Affect:  constricted   Thought Process:  goal directed   Thought Content: no verbalized delusions or overt paranoia   Perceptual disturbances: no reported hallucinations and does not appear to be responding to internal stimuli at this time   Risk Potential: No active or passive suicidal or homicidal ideation was verbalized during interview   Cognition: oriented to self and situation and cognition not formally tested   Insight:  Limited   Judgment: Limited     Current Medications:  Current Facility-Administered Medications   Medication Dose Route Frequency Provider Last Rate    aluminum-magnesium hydroxide-simethicone  30 mL Oral Q4H PRN JHONATAN Fields      atoMOXetine  40 mg Oral Daily Anatoly Prajapati,       buPROPion  450 mg Oral Daily Anatoly Prajapati,       cloNIDine  0.2 mg Oral Q12H [facility] JHONATAN Anne      cyanocobalamin  1,000 mcg Oral Daily JHONATAN Berg      cyanocobalamin  1,000 mcg Intramuscular Q30 Days JHONATAN Berg      ergocalciferol  50,000 Units Oral Weekly JHONATAN Berg      furosemide  20 mg Oral Daily JHONATAN Fields      hydrOXYzine HCL  25 mg Oral Q6H PRN Max 4/day Virginia Mccullough, JHONATAN      hydrOXYzine HCL  50 mg Oral Q6H PRN Max 4/day Virginia Mccullough, JHONATAN      ibuprofen  200 mg Oral Q6H PRN Virginia Mccullough, LAURANP      ibuprofen  400 mg Oral Q6H PRN Virginia Mccullough, LAURANP      ibuprofen  600 mg Oral Q8H PRN Virginia Mccullough, JHONATAN      levothyroxine  125 mcg Oral Early Morning JHONATAN Fields      loperamide  2 mg Oral  Q4H PRN Anatoly Prajapati, DO      LORazepam  1 mg Intramuscular Q6H PRN Max 3/day JHONATAN Anne      LORazepam  1 mg Oral Q6H PRN Max 3/day JHONATAN Anne      losartan  100 mg Oral Daily JHONATAN Fields      melatonin  3 mg Oral HS Frederick Smith MD      naltrexone  50 mg Oral Daily Anatoly Prajapati DO      OLANZapine  10 mg Oral Q6H PRN Frederick Smith MD      Or    OLANZapine  10 mg Intramuscular Q6H PRN Frederick Smith MD      OLANZapine  5 mg Oral Q6H PRN Frederick Smith MD      Or    OLANZapine  5 mg Intramuscular Q6H PRN Frederick Smith MD      OLANZapine  2.5 mg Oral Q3H PRN Frederick Smith MD      OXcarbazepine  450 mg Oral Q12H [facility]HA Frederick Smith MD      oxybutynin  5 mg Oral BID JHONATAN Fields      [START ON 1/15/2024] paliperidone  234 mg IM- Deltoid Q4 weeks Frederick Smith MD      pantoprazole  40 mg Oral Early Morning JHONATAN Fields      polyethylene glycol  17 g Oral Daily PRN JHONATAN Anne      saccharomyces boulardii  250 mg Oral BID Anatoly Prajapati, DO      senna-docusate sodium  1 tablet Oral Daily PRN JHONATAN Anne      topiramate  200 mg Oral BID Anatoly Prajapati,       traZODone  50 mg Oral HS PRN JHONATAN Anne      warfarin  6 mg Oral Daily (warfarin) JHONATAN Berg         Behavioral Health Medications: all current active meds have been reviewed. Changes as in plan section above.    Laboratory results:  I have personally reviewed all pertinent laboratory/tests results.  Recent Results (from the past 48 hour(s))   Protime-INR    Collection Time: 01/02/24  9:24 AM   Result Value Ref Range    Protime 14.3 11.6 - 14.5 seconds    INR 1.08 0.84 - 1.19   COVID/FLU/RSV    Collection Time: 01/02/24  9:24 AM    Specimen: Nose; Nares   Result Value Ref Range    SARS-CoV-2 Positive (A) Negative    INFLUENZA A PCR Negative Negative    INFLUENZA B PCR Negative Negative    RSV PCR Negative Negative        [place]  JHONATAN Mike    This note was completed in part utilizing Dragon dictation Software. Grammatical, translation, syntax errors, random word insertions, spelling mistakes, and incomplete sentences may be an occasional consequence of this system secondary to software limitations with voice recognition, ambient noise, and hardware issues. If you have any questions or concerns about the content, text, or information contained within the body of this dictation, please contact the provider for clarification

## 2024-01-02 NOTE — TREATMENT TEAM
01/02/24 0526   Dillon Anxiety Scale   Anxious Mood 3   Tension 2   Fears 2   Insomnia 0   Intellectual 2   Depressed Mood 2   Somatic Complaints: Muscular 3   Somatic Complaints: Sensory 2   Cardiovascular Symptoms 0   Respiratory Symptoms 2   Gastrointestinal Symptoms 0   Genitourinary Symptoms 0   Autonomic Symptoms 0   Behavior at Interview 3   Dillon Anxiety Score 21     Patient approached nurses' station requesting prn. Atarax 50 mg was administered at 0528.

## 2024-01-02 NOTE — PLAN OF CARE
Problem: Risk for Self Injury/Neglect  Goal: Treatment Goal: Remain safe during length of stay, learn and adopt new coping skills, and be free of self-injurious ideation, impulses and acts at the time of discharge  Outcome: Progressing  Goal: Refrain from harming self  Description: Interventions:  - Monitor patient closely, per order  - Develop a trusting relationship  - Supervise medication ingestion, monitor effects and side effects   Outcome: Progressing  Goal: Recognize maladaptive responses and adopt new coping mechanisms  Outcome: Progressing  Goal: Complete daily ADLs, including personal hygiene independently, as able  Description: Interventions:  - Observe, teach, and assist patient with ADLS  - Monitor and promote a balance of rest/activity, with adequate nutrition and elimination  Outcome: Progressing     Problem: Depression  Goal: Treatment Goal: Demonstrate behavioral control of depressive symptoms, verbalize feelings of improved mood/affect, and adopt new coping skills prior to discharge  Outcome: Progressing  Goal: Verbalize thoughts and feelings  Description: Interventions:  - Assess and re-assess patient's level of risk   - Engage patient in 1:1 interactions, daily, for a minimum of 15 minutes   - Encourage patient to express feelings, fears, frustrations, hopes   Outcome: Progressing  Goal: Refrain from harming self  Description: Interventions:  - Monitor patient closely, per order   - Supervise medication ingestion, monitor effects and side effects   Outcome: Progressing  Goal: Refrain from isolation  Description: Interventions:  - Develop a trusting relationship   - Encourage socialization   Outcome: Not Progressing  Goal: Refrain from self-neglect  Outcome: Progressing     Problem: Anxiety  Goal: Anxiety is at manageable level  Description: Interventions:  - Assess and monitor patient's anxiety level.   - Monitor for signs and symptoms (heart palpitations, chest pain, shortness of breath,  headaches, nausea, feeling jumpy, restlessness, irritable, apprehensive).   - Collaborate with interdisciplinary team and initiate plan and interventions as ordered.  - [place] patient to unit/surroundings  - Explain treatment plan  - Encourage participation in care  - Encourage verbalization of concerns/fears  - Identify coping mechanisms  - Assist in developing anxiety-reducing skills  - Administer/offer alternative therapies  - Limit or eliminate stimulants  Outcome: Progressing

## 2024-01-02 NOTE — CASE MANAGEMENT
Pulling together the E[facility] referral to send tomorrow. Patient asked to see this writer and was not able to get to see her today, will see her tomorrow.

## 2024-01-02 NOTE — PROGRESS NOTES
12/29/23 0200   Team Meeting   Meeting Type Tx Team Meeting   Initial Conference Date 12/29/23   Next Conference Date 01/26/24   Team Members Present   Team Members Present Physician;Nurse;   Physician Team Member Dr Omer   Nursing Team Member Misael   Care Management Team Member Atiya   Patient/Family Present   Patient Present Yes   Patient's Family Present No     Met with the patient to go over their treatment plan. Goals discussed were to have a decrease in depressive symptoms, decrease in anxiety symptoms, decrease in paranoid thoughts, ability to stay safe on the unit, ability to stay free of restraints, improvement in ability to express basic needs, improvement in insight, improvement in reasoning ability, improvement in self care, sleep improvement, improvement in appetite, mood stabilization, increase in group attendance, increase in socialization with peers on the unit, acceptance of need for psychiatric treatment, acceptance of psychiatric medications. Goals can be attained through medication management and group/milieu therapy.     Patient agrees with their treatment plan.

## 2024-01-02 NOTE — SOCIAL WORK
Pt wearing mask in doorway of room with awareness of Covid symptoms and testing.  Pt indicated she wanted to f/u with nursing about symptoms and able to make needs known.  Anxious and pacing. Continue to monitor and offer therapeutic support.

## 2024-01-02 NOTE — NURSING NOTE
Patient is calm and cooperative with care. Withdrawn to room throughout shift. Denies all psych s/s. Medication complaint. Patient encouraged to make needs known. Safety checks ongoing

## 2024-01-02 NOTE — PROGRESS NOTES
01/02/24 0822   Team Meeting   Meeting Type Daily Rounds   Initial Conference Date 01/02/24   Next Conference Date 01/03/24   Team Members Present   Team Members Present Physician;Nurse;;;Occupational Therapist   Physician Team Member SHEFALI Rodriguez M. Noonan   Nursing Team Member Yolanda   Care Management Team Member Atiya   Social Work Team Member Dalia TALBOT Team Member Yeison   Patient/Family Present   Patient Present No   Patient's Family Present No     Episodes of crying, will send EAC referral.

## 2024-01-03 PROCEDURE — 99232 SBSQ HOSP IP/OBS MODERATE 35: CPT | Performed by: STUDENT IN AN ORGANIZED HEALTH CARE EDUCATION/TRAINING PROGRAM

## 2024-01-03 RX ADMIN — CLONIDINE HYDROCHLORIDE 0.2 MG: 0.1 TABLET ORAL at 21:31

## 2024-01-03 RX ADMIN — OXYBUTYNIN CHLORIDE 5 MG: 5 TABLET ORAL at 08:40

## 2024-01-03 RX ADMIN — Medication 250 MG: at 08:33

## 2024-01-03 RX ADMIN — CYANOCOBALAMIN 1000 MCG: 1000 INJECTION INTRAMUSCULAR; SUBCUTANEOUS at 08:35

## 2024-01-03 RX ADMIN — LEVOTHYROXINE SODIUM 125 MCG: 125 TABLET ORAL at 06:08

## 2024-01-03 RX ADMIN — Medication 250 MG: at 17:20

## 2024-01-03 RX ADMIN — LOSARTAN POTASSIUM 100 MG: 50 TABLET, FILM COATED ORAL at 08:34

## 2024-01-03 RX ADMIN — OXCARBAZEPINE 450 MG: 150 TABLET, FILM COATED ORAL at 21:31

## 2024-01-03 RX ADMIN — WARFARIN SODIUM 6 MG: 2 TABLET ORAL at 17:20

## 2024-01-03 RX ADMIN — CLONIDINE HYDROCHLORIDE 0.2 MG: 0.1 TABLET ORAL at 08:34

## 2024-01-03 RX ADMIN — TOPIRAMATE 200 MG: 100 TABLET, FILM COATED ORAL at 08:34

## 2024-01-03 RX ADMIN — OXYBUTYNIN CHLORIDE 5 MG: 5 TABLET ORAL at 17:20

## 2024-01-03 RX ADMIN — BUPROPION HYDROCHLORIDE 450 MG: 300 TABLET, EXTENDED RELEASE ORAL at 08:34

## 2024-01-03 RX ADMIN — MELATONIN TAB 3 MG 3 MG: 3 TAB at 21:31

## 2024-01-03 RX ADMIN — CYANOCOBALAMIN TAB 1000 MCG 1000 MCG: 1000 TAB at 08:34

## 2024-01-03 RX ADMIN — OXCARBAZEPINE 450 MG: 150 TABLET, FILM COATED ORAL at 08:34

## 2024-01-03 RX ADMIN — NALTREXONE HYDROCHLORIDE 50 MG: 50 TABLET, FILM COATED ORAL at 08:34

## 2024-01-03 RX ADMIN — FUROSEMIDE 20 MG: 20 TABLET ORAL at 08:34

## 2024-01-03 RX ADMIN — ATOMOXETINE 40 MG: 40 CAPSULE ORAL at 08:33

## 2024-01-03 RX ADMIN — PANTOPRAZOLE SODIUM 40 MG: 40 TABLET, DELAYED RELEASE ORAL at 06:08

## 2024-01-03 RX ADMIN — TOPIRAMATE 200 MG: 100 TABLET, FILM COATED ORAL at 17:20

## 2024-01-03 RX ADMIN — HYDROXYZINE HYDROCHLORIDE 50 MG: 50 TABLET, FILM COATED ORAL at 10:22

## 2024-01-03 NOTE — PROGRESS NOTES
01/03/24 0758   Team Meeting   Meeting Type Daily Rounds   Initial Conference Date 01/03/24   Next Conference Date 01/04/24   Team Members Present   Team Members Present Physician;Nurse;;;Occupational Therapist   Physician Team Member SHEFALI Rodriguez   Nursing Team Member Reyes   Care Management Team Member Atiya   Social Work Team Member Dalia   OT Team Member Yeison   Patient/Family Present   Patient Present No   Patient's Family Present No     COVID+, ativan given upset about EAC, wants to go home instead of E[facility], she reports that she is better, but still acting out.

## 2024-01-03 NOTE — PLAN OF CARE
Problem: Ineffective Coping  Goal: Identifies ineffective coping skills  Outcome: Progressing  Goal: Identifies healthy coping skills  Outcome: Progressing  Goal: Demonstrates healthy coping skills  Outcome: Progressing  Goal: Participates in unit activities  Description: Interventions:  - Provide therapeutic environment   - Provide required programming   - Redirect inappropriate behaviors   Outcome: Not Progressing  Goal: Patient/Family participate in treatment and DC plans  Description: Interventions:  - Provide therapeutic environment  Outcome: Progressing  Goal: Patient/Family verbalizes awareness of resources  Outcome: Progressing  Goal: Understands least restrictive measures  Description: Interventions:  - Utilize least restrictive behavior  Outcome: Progressing  Goal: Free from restraint events  Description: - Utilize least restrictive measures   - Provide behavioral interventions   - Redirect inappropriate behaviors   Outcome: Progressing     Problem: Risk for Self Injury/Neglect  Goal: Treatment Goal: Remain safe during length of stay, learn and adopt new coping skills, and be free of self-injurious ideation, impulses and acts at the time of discharge  Outcome: Not Progressing  Goal: Verbalize thoughts and feelings  Description: Interventions:  - Assess and re-assess patient's lethality and potential for self-injury  - Engage patient in 1:1 interactions, daily, for a minimum of 15 minutes  - Encourage patient to express feelings, fears, frustrations, hopes  - Establish rapport/trust with patient   Outcome: Progressing  Goal: Refrain from harming self  Description: Interventions:  - Monitor patient closely, per order  - Develop a trusting relationship  - Supervise medication ingestion, monitor effects and side effects   Outcome: Progressing  Goal: Recognize maladaptive responses and adopt new coping mechanisms  Outcome: Progressing  Goal: Complete daily ADLs, including personal hygiene independently, as  able  Description: Interventions:  - Observe, teach, and assist patient with ADLS  - Monitor and promote a balance of rest/activity, with adequate nutrition and elimination  Outcome: Progressing     Problem: Depression  Goal: Treatment Goal: Demonstrate behavioral control of depressive symptoms, verbalize feelings of improved mood/affect, and adopt new coping skills prior to discharge  Outcome: Progressing

## 2024-01-03 NOTE — NURSING NOTE
The patient was isolative to her room most the evening, she did come out with a mask on each time leaving room. Patient is calm and cooperative with this nurse. The patient endorses depression and anxiety. Denies SI/HI/AVH. The patient's only focus is going home to her cat. She was sad this evening, about having to go to an EAC. Patient was med compliant. Safety checks ongoing.

## 2024-01-03 NOTE — PROGRESS NOTES
Progress Note - Behavioral Health   Blanca Mason 53 y.o. female MRN: [id number]  Unit/Bed#: [facility] 651-02 Encounter: [id number]    Patient was seen today for continuation of care, records reviewed and patient was discussed with the morning case review team.    Blanca was seen today for psychiatric follow-up.  On assessment today, Blanca was superficially cooperative and evasive.  Preoccupied with discharge, patient continues to have lack of insight into her actions and easily blames others.  Also intermittently tearful and attention seeking, patient continues to receive as needed Ativan to assist with symptoms.  Invega Sustenna to be given on 1/14/2024, patient clonidine also increased to 0.2 mg twice daily for ongoing irritability and impulsivity.  Case management to make referral to EAC due to elevated suicidality in the community despite collaborative resources including ACT team.    Blanca denies acute suicidal/self-harm ideation/intent/plan upon direct inquiry at this time.  Blanca remains behaviorally appropriate, no agitation or aggression noted on exam or in report.  Blanca also denies HI/AH/VH, and does not appear overtly manic.  No overt delusions or paranoia are verbalized. Impulse control is intact.  Blanca remains adherent to her current psychotropic medication regimen and denies any side effects from medications, as well as none noted on exam.    Vitals:  Vitals:    01/03/24 0806   BP: 133/74   Pulse: 81   Resp: 18   Temp: 98 °F (36.7 °C)   SpO2: 93%       Laboratory Results:  I have personally reviewed all pertinent laboratory/tests results.  Most Recent Labs:   Lab Results   Component Value Date    WBC 8.96 12/02/2023    RBC 4.34 12/02/2023    HGB 12.9 12/02/2023    HCT 40.5 12/02/2023     12/02/2023    RDW 14.2 12/02/2023    TOTANEUTABS 4.73 11/19/2023    NEUTROABS 5.77 12/02/2023    SODIUM 139 12/02/2023    K 3.8 12/02/2023     12/02/2023    CO2 21 12/02/2023    BUN 13 12/02/2023    CREATININE  0.62 12/02/2023    GLUC 107 12/02/2023    GLUF 107 (H) 12/02/2023    CALCIUM 8.7 12/02/2023    AST 13 12/02/2023    ALT 13 12/02/2023    ALKPHOS 55 12/02/2023    TP 6.0 (L) 12/02/2023    ALB 3.5 12/02/2023    TBILI 0.25 12/02/2023    CHOLESTEROL 234 (H) 03/09/2023    HDL 71 03/09/2023    TRIG 153 (H) 03/09/2023    LDLCALC 132 (H) 03/09/2023    NONHDLC 163 03/09/2023    VALPROICTOT 23 (L) 11/10/2023    AMMONIA 63 11/06/2023    CKI2FOWNTASS 4.324 12/02/2023    FREET4 0.81 11/06/2023    T3FREE 2.27 (L) 10/16/2019    PREGUR negative 07/13/2022    PREGSERUM Negative 11/06/2023    RPR Non-Reactive 07/17/2022    HGBA1C 5.0 01/05/2023    EAG 97 01/05/2023       Psychiatric Review of Systems:  Behavior over the last 24 hours:  unchanged.   Sleep: good  Appetite: good  Medication side effects: none  ROS: no complaints, denies shortness of breath or chest pain and all other systems are negative for acute changes    Mental Status Evaluation:  Appearance:  adequate grooming   Behavior:  demanding, guarded   Speech:  normal rate and volume   Mood:  labile   Affect:  constricted, tearful   Thought Process:  linear   Thought Content:  no overt delusions   Perceptual Disturbances: denies auditory hallucinations when asked, does not appear responding to internal stimuli   Risk Potential: Suicidal ideation - None  Homicidal ideation - None  Potential for aggression - No   Memory:  recent and remote memory grossly intact   Sensorium  person, place, and time/date      Consciousness:  alert and awake   Attention: attention span and concentration are age appropriate   Insight:  limited   Judgment: limited   Gait/Station: normal gait/station   Motor Activity: no abnormal movements   Progress Toward Goals:   Blanca is progressing towards goals of inpatient psychiatric treatment by continued medication compliance and is attending therapeutic modalities on the milieu. However, the patient continues to require inpatient psychiatric  hospitalization for continued medication management and titration to optimize symptom reduction, improve sleep hygiene, and demonstrate adequate self-care.    Assessment/Plan   Principal Problem:    Schizoaffective disorder, bipolar type (HCC)  Active Problems:    Benign essential hypertension    Edema    Hypothyroidism    MAG (obstructive sleep apnea)    Overactive bladder    Sjogren's syndrome (HCC)    Medical clearance for psychiatric admission    Anxiety    Borderline personality disorder (HCC)    History of pulmonary embolism    Ingrown toenail      Recommended Treatment: Treatment plan and medication changes discussed and per the attending physician the plan is:    1.Continue with group therapy, milieu therapy and occupational therapy  2.Behavioral Health checks every 7 minutes  3.Continue frequent safety checks and vitals per unit protocol  4.Continue with [facility] medical management as indicated  5.Continue with current medication regimen  6.Will review labs in the a.m.  7.Disposition Planning: Discharge planning and efforts remain ongoing    Behavioral Health Medications: all current active meds have been reviewed and continue current psychiatric medications.  Current Facility-Administered Medications   Medication Dose Route Frequency Provider Last Rate    aluminum-magnesium hydroxide-simethicone  30 mL Oral Q4H PRN JHONATAN Fields      atoMOXetine  40 mg Oral Daily Anatoly Prajapati, DO      buPROPion  450 mg Oral Daily Anatoly Prajapati, DO      cloNIDine  0.2 mg Oral Q12H [facility] JHONATAN Anne      cyanocobalamin  1,000 mcg Oral Daily JHONATAN Berg      cyanocobalamin  1,000 mcg Intramuscular Q30 Days JHONATAN Berg      ergocalciferol  50,000 Units Oral Weekly JHONATAN Berg      furosemide  20 mg Oral Daily JHONATAN Fields      hydrOXYzine HCL  25 mg Oral Q6H PRN Max 4/day JHONATAN Anne      hydrOXYzine HCL  50 mg Oral Q6H PRN Max  4/day Virginia Mccullough, CRNP      ibuprofen  200 mg Oral Q6H PRN [place] Mccullough, CRNP      ibuprofen  400 mg Oral Q6H PRN [place] Mccullough, CRNP      ibuprofen  600 mg Oral Q8H PRN [place] Mccullough, CRNP      levothyroxine  125 mcg Oral Early Morning Wendy Anderson, CRNP      loperamide  2 mg Oral Q4H PRN Anatoly Prajapati, DO      LORazepam  1 mg Intramuscular Q6H PRN Max 3/day [place] Mccullough, CRNP      LORazepam  1 mg Oral Q6H PRN Max 3/day [place] Mccullough, CRNP      losartan  100 mg Oral Daily Wendy Anderson, CRNP      melatonin  3 mg Oral HS Frederick Smith MD      naltrexone  50 mg Oral Daily Anatoly Prajapati, DO      OLANZapine  10 mg Oral Q6H PRN Frederick Smith MD      Or    OLANZapine  10 mg Intramuscular Q6H PRN Frederick Smith MD      OLANZapine  5 mg Oral Q6H PRN Frederick Smith MD      Or    OLANZapine  5 mg Intramuscular Q6H PRN Frederick Smith MD      OLANZapine  2.5 mg Oral Q3H PRN Frederick Smith MD      OXcarbazepine  450 mg Oral Q12H [facility] Frederick Smith MD      oxybutynin  5 mg Oral BID JHONATAN Fields      [START ON 1/15/2024] paliperidone  234 mg IM- Deltoid Q4 weeks Frederick Smith MD      pantoprazole  40 mg Oral Early Morning JHONATAN Fields      polyethylene glycol  17 g Oral Daily PRN JHONATAN Anne      saccharomyces boulardii  250 mg Oral BID Anatoly Prajapati, DO      senna-docusate sodium  1 tablet Oral Daily PRN [place] Mccullough, JHONATAN      topiramate  200 mg Oral BID Anatoly Prajapati, DO      traZODone  50 mg Oral HS PRN JHONATAN Anne      warfarin  6 mg Oral Daily (warfarin) JHONATAN Berg         Risks / Benefits of Treatment:  Risks, benefits, and possible side effects of medications explained to patient and patient verbalizes understanding and agreement for treatment.    Counseling / Coordination of Care:  Patient's progress reviewed with nursing staff.  Medications, treatment progress and treatment plan reviewed with  patient.  Supportive counseling provided to the patient.    Total floor/unit time spent today 25 minutes. Greater than 50% of total time was spent with the patient and / or family counseling and / or coordination of care. A description of the counseling / coordination of care: medication education, treatment plan, supportive therapy.    This note was completed in part utilizing Dragon dictation Software. Grammatical, translation, syntax errors, random word insertions, spelling mistakes, and incomplete sentences may be an occasional consequence of this system secondary to software limitations with voice recognition, ambient noise, and hardware issues. If you have any questions or concerns about the content, text, or information contained within the body of this dictation, please contact the provider for clarification

## 2024-01-03 NOTE — PROGRESS NOTES
01/03/24 1100   Activity/Group Checklist   Group Life Skills  (topic facing fears.)   Attendance Attended   Attendance Duration (min) 31-45   Interactions Other (Comment)  (pt. sat away from peers and maintained mask for full session as instructed.Pt. tearful about plans to go to an EAC percieves it as being sent away forever. Peers offered support and hope for postive future opportunities.)   Affect/Mood Wide   Goals Achieved Able to engage in interactions;Able to listen to others;Identified feelings;Identified triggers;Discussed coping strategies

## 2024-01-03 NOTE — TREATMENT TEAM
"   01/03/24 1022   Dillon Anxiety Scale   Anxious Mood 4   Tension 2   Fears 4   Insomnia 2   Intellectual 2   Depressed Mood 2   Somatic Complaints: Muscular 0   Somatic Complaints: Sensory 0   Cardiovascular Symptoms 0   Respiratory Symptoms 0   Gastrointestinal Symptoms 0   Genitourinary Symptoms 0   Autonomic Symptoms 2   Behavior at Interview 2   Dillon Anxiety Score 20      Patient crying and feeling anxious. States she doesn't do anything to her peers, states \"they always think I am the mean one\" when asked who's they patient stated \"doctors\". Reported feeling anxious and wanting to speak to the doctor. Atarax 50 mg administered for moderate anxiety, provider notified.  "

## 2024-01-03 NOTE — NURSING NOTE
Patient cooperative with care. Isolative to room, withdrawn to room, wants to continue to participate in groups with mask on, tearful at times, Covid positive, and remains on isolation precautions. Endorses anxiety/depression related to her situation with placement and her cat. Patient counseled twice this tour. Patient stated she is making a decision to quit her job and give her cat up. Patient redirected to speak to  and discuss her concerns related to her situation. Patient denies SI/HI, AH/VH at this time. Safety precautions maintained.

## 2024-01-03 NOTE — PLAN OF CARE
Problem: Risk for Self Injury/Neglect  Goal: Attend and participate in unit activities, including therapeutic, recreational, and educational groups  Description: Interventions:  - Provide therapeutic and educational activities daily, encourage attendance and participation, and document same in the medical record  - Obtain collateral information, encourage visitation and family involvement in care   Outcome: Not Progressing

## 2024-01-04 ENCOUNTER — PATIENT OUTREACH (OUTPATIENT)
Dept: CASE MANAGEMENT | Facility: OTHER | Age: 54
End: 2024-01-04

## 2024-01-04 PROCEDURE — 99232 SBSQ HOSP IP/OBS MODERATE 35: CPT | Performed by: STUDENT IN AN ORGANIZED HEALTH CARE EDUCATION/TRAINING PROGRAM

## 2024-01-04 RX ORDER — WATER 10 ML/10ML
INJECTION INTRAMUSCULAR; INTRAVENOUS; SUBCUTANEOUS
Status: COMPLETED
Start: 2024-01-04 | End: 2024-01-04

## 2024-01-04 RX ADMIN — WARFARIN SODIUM 6 MG: 2 TABLET ORAL at 17:28

## 2024-01-04 RX ADMIN — MELATONIN TAB 3 MG 3 MG: 3 TAB at 21:39

## 2024-01-04 RX ADMIN — TOPIRAMATE 200 MG: 100 TABLET, FILM COATED ORAL at 17:28

## 2024-01-04 RX ADMIN — OXCARBAZEPINE 450 MG: 150 TABLET, FILM COATED ORAL at 21:39

## 2024-01-04 RX ADMIN — OLANZAPINE 10 MG: 10 INJECTION, POWDER, LYOPHILIZED, FOR SOLUTION INTRAMUSCULAR at 08:32

## 2024-01-04 RX ADMIN — OXYBUTYNIN CHLORIDE 5 MG: 5 TABLET ORAL at 17:28

## 2024-01-04 RX ADMIN — Medication 250 MG: at 17:27

## 2024-01-04 RX ADMIN — LEVOTHYROXINE SODIUM 125 MCG: 125 TABLET ORAL at 06:03

## 2024-01-04 RX ADMIN — CLONIDINE HYDROCHLORIDE 0.2 MG: 0.1 TABLET ORAL at 21:39

## 2024-01-04 RX ADMIN — PANTOPRAZOLE SODIUM 40 MG: 40 TABLET, DELAYED RELEASE ORAL at 06:03

## 2024-01-04 RX ADMIN — WATER 10 ML: 1 INJECTION INTRAMUSCULAR; INTRAVENOUS; SUBCUTANEOUS at 08:32

## 2024-01-04 NOTE — PROGRESS NOTES
Outpatient Care Management Note:  Chart completed on Blanca who remains inpatient.  Current plan is transfer to [facility].  Will continue to monitor and outreach once Blanca is discharged to home.

## 2024-01-04 NOTE — NURSING NOTE
"Patient visible on the unit to make needs known and occasional phone calls, otherwise withdrawn to room. Patient initially scant and minimal in conversation on approach, with encouragement patient begins crying. Patient states \"Im giving up, the doctors are too negative so I wont eat, drink, or do anything for myself anymore\", patient difficult to reassure. Patient reporting \"severe\" depression and anxiety at this time but denies SI/HI/AVH. Patient compliant with evening meal with encouragement, notably brighter mood after meal. Patient cooperative with medications. No further signs of distress noted.  "

## 2024-01-04 NOTE — NURSING NOTE
Pt tearful and angry, refused to have vital signs taken.  When asked for an explanation pt stated that the staff last night was afraid to touch her when taking her vital signs.  Stated that everyone is ignoring her and not talking to her.  Pt reminded that she has tested positive for Covid and it is unrealistic to expect the same amount of interactions.  Pt then spit out her meds and threw some across the room.  Pt informed that her behavior was child-like and she stated that she didn't want this writer near her.  Pt then accepted Zyprexa 10 mg IM to right deltoid by Charleen ORTIZ with positive effect.  Monitored for safety and support.

## 2024-01-04 NOTE — CASE MANAGEMENT
Patient's ACT team was supposed to come see her today, but since she has COVID they did not come. She was upset and wanted to know why they didn't call her instead. Called [facility] at [phone] and left a message for Annemarie to call the patient.

## 2024-01-04 NOTE — TREATMENT TEAM
Pt initially needed limit setting upon entering group.  Pt was swearing and rough with chair calling attention to herself.  Pt constricted affect and and irritable edge.  Pt (after speaking with ) did eventually did acknowledge that she enjoys music as coping skill and learned how to play guitar. Pt agreed she was able to learn a new skill and adapt to change.  (Working on self esteem, strengths and self)     01/04/24 1000   Activity/Group Checklist   Group Community meeting   Attendance Other (Comment)  (entered swearing and needed redirection, left w/ doctor and returned)   Attendance Duration (min) 31-45   Interactions Other (Comment)  (irritable)   Affect/Mood Angry;Constricted   Goals Achieved Identified feelings;Identified relapse prevention strategies;Able to listen to others;Able to engage in interactions;Able to reflect/comment on own behavior;Able to manage/cope with feelings;Verbalized increased hopefulness;Able to self-disclose;Able to recieve feedback

## 2024-01-04 NOTE — PROGRESS NOTES
01/04/24 0805   Team Meeting   Meeting Type Daily Rounds   Initial Conference Date 01/04/24   Next Conference Date 01/05/24   Team Members Present   Team Members Present Physician;Nurse;;;Occupational Therapist   Physician Team Member SHEFALI Rodriguez J. Stives   Nursing Team Member Jennifer   Care Management Team Member Atiya   Social Work Team Member Dalia   OT Team Member Yeison   Patient/Family Present   Patient Present No   Patient's Family Present No     IM this am for throwing her food tray, upset yesterday that staff told her she does not get along with people, reported that she is giving up and will stop eating, get rid of cat, and quit her job, EAC referral sent in and waiting on [facility] meeting, may give next dose of SNOWDEN on 7th instead of the 14th.

## 2024-01-04 NOTE — PROGRESS NOTES
01/04/24 1330   Activity/Group Checklist   Group Life Skills  (guided breathing relaxation task.)   Attendance Attended   Attendance Duration (min) 16-30   Interactions Did not interact   Affect/Mood Constricted   Goals Achieved Able to listen to others

## 2024-01-04 NOTE — PLAN OF CARE
Problem: Risk for Self Injury/Neglect  Goal: Treatment Goal: Remain safe during length of stay, learn and adopt new coping skills, and be free of self-injurious ideation, impulses and acts at the time of discharge  Outcome: Progressing  Goal: Verbalize thoughts and feelings  Description: Interventions:  - Assess and re-assess patient's lethality and potential for self-injury  - Engage patient in 1:1 interactions, daily, for a minimum of 15 minutes  - Encourage patient to express feelings, fears, frustrations, hopes  - Establish rapport/trust with patient   Outcome: Progressing  Goal: Refrain from harming self  Description: Interventions:  - Monitor patient closely, per order  - Develop a trusting relationship  - Supervise medication ingestion, monitor effects and side effects   Outcome: Progressing  Goal: Attend and participate in unit activities, including therapeutic, recreational, and educational groups  Description: Interventions:  - Provide therapeutic and educational activities daily, encourage attendance and participation, and document same in the medical record  - Obtain collateral information, encourage visitation and family involvement in care   Outcome: Progressing  Goal: Recognize maladaptive responses and adopt new coping mechanisms  Outcome: Progressing  Goal: Complete daily ADLs, including personal hygiene independently, as able  Description: Interventions:  - Observe, teach, and assist patient with ADLS  - Monitor and promote a balance of rest/activity, with adequate nutrition and elimination  Outcome: Progressing     Problem: Risk for Violence/Aggression Toward Others  Goal: Treatment Goal: Refrain from acts of violence/aggression during length of stay, and demonstrate improved impulse control at the time of discharge  Outcome: Progressing  Goal: Verbalize thoughts and feelings  Description: Interventions:  - Assess and re-assess patient's level of risk, every waking shift  - Engage patient in 1:1  interactions, daily, for a minimum of 15 minutes   - Allow patient to express feelings and frustrations in a safe and non-threatening manner   - Establish rapport/trust with patient   Outcome: Progressing  Goal: Refrain from harming others  Outcome: Progressing  Goal: Refrain from destructive acts on the environment or property  Outcome: Progressing  Goal: Control angry outbursts  Description: Interventions:  - Monitor patient closely, per order  - Ensure early verbal de-escalation  - Monitor prn medication needs  - Set reasonable/therapeutic limits, outline behavioral expectations, and consequences   - Provide a non-threatening milieu, utilizing the least restrictive interventions   Outcome: Progressing  Goal: Attend and participate in unit activities, including therapeutic, recreational, and educational groups  Description: Interventions:  - Provide therapeutic and educational activities daily, encourage attendance and participation, and document same in the medical record   Outcome: Progressing  Goal: Identify appropriate positive anger management techniques  Description: Interventions:  - Offer anger management and coping skills groups   - Staff will provide positive feedback for appropriate anger control  Outcome: Progressing

## 2024-01-04 NOTE — CONSULTS
"[facility] - Consultation    Patient Information:   Blanca Mason 53 y.o. female MRN: [id number]  Unit/Bed#: [facility] 651-02 Encounter: [id number]  PCP: JHONATAN Armando  Date of Admission:  12/1/2023  Date of Consultation: 01/04/24  Requesting Physician: Frederick Smith MD      ASSESSMENT:    Blanca Mason is a 53 y.o. female with:    Right hallux painful ingrown nail     PLAN:    Pt seen and evaluated   Right hallux slant back performed   Please have pt follow up as outpt as needed for recurrent ingrown nails - [facility]   Podiatry to sign off. Please reconsult as needed   Rest of care per primary team.    Antibiotics: none  Weight Bearing Status: FWB    SUBJECTIVE    History of Present Illness:    Blanca Mason is a 53 y.o. female with past medical history listed below who presents with right great toe pain. States that she sees Dr. Bautista for this as she has had recurrent ingrown nails. States that is has been bothering her for a while now but she has been unable to cut the nail herself.     Review of Systems:    12 point review of systems is negative unless otherwise specified in the above HPI.    Past Medical and Surgical History:     Past Medical History:   Diagnosis Date    Anxiety     Arthritis     oa cassandra knees    Asthma     good control- no medications    Yan's esophagus     Bipolar affective disorder (HCC)     Cannabis abuse with unspecified 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HCC)     Chronic pain disorder     lumbar    COPD (chronic obstructive pulmonary disease) (HCC)     CPAP (continuous positive airway pressure) dependence     DDD (degenerative disc disease), lumbar 04/09/2015    Degenerative disc disease at L5-S1 level     Deliberate self-cutting     9/15/22per pt has not done recently-- does see a therapist and psychiatrist regularly    Depression 09/16/2008    Disease of thyroid gland     hypo    MARTINEZ (dyspnea on exertion)     per pt \"has had this with " "exertion and not new\"    Drug overdose 10/28/2008    suicide attempt and again drug overdose 7/2022-- [facility] floor and than transferred to [facility]    Dysphagia     Dyspnea     Edema     BLE    Family history of blood clots     GERD (gastroesophageal reflux disease)     Headache(784.0)     Headache, tension-type     Hemorrhage of rectum and anus 10/02/2017    Formatting of this note might be different from the original.  Added automatically from request for surgery [id number]    High blood pressure     History of COVID-19 12/30/2020    Symptoms started on 12/30/2020 and then got worse.  Today she is feeling a little bit better.  She is a candidate for monoclonal antibody infusion and set for 1/6/21 @ 1pm at [facility]. 01/07/21 - telemedicine -     Hyperlipidemia     Hypertension     Knee pain, bilateral     Especially right    Medial epicondylitis of elbow, left 04/03/2018    Formatting of this note might be different from the original.  Added automatically from request for surgery [id number]    Medial epicondylitis of right elbow 07/17/2023    Medical marijuana use     Memory loss     Migraines     Obesity     Overactive bladder     Primary osteoarthritis of both knees 08/08/2018    Pulmonary emboli (HCC)     Restrictive lung disease 02/01/2017    Last Assessment & Plan:   Formatting of this note might be different from the original.  I reviewed this problem throughout the rest of the note. Please refer to the other assessments for details.    Sjogren's disease (HCC)     Sleep apnea     Stress incontinence     Suicidal ideations     Teeth missing     Tremor     per pt Tremors of Right Leg and both Arms    Visual impairment     Wears glasses        Past Surgical History:   Procedure Laterality Date    BREAST CYST EXCISION Right 1989    CARPAL TUNNEL RELEASE Left     CHOLECYSTECTOMY  05/2003    Laparoscopic    COLONOSCOPY      01/12/2009    DILATION AND CURETTAGE OF UTERUS      ELBOW SURGERY Left     x2. No " hardware    ESOPHAGOGASTRODUODENOSCOPY      FOOT SURGERY Left     Plantar fasciotomy    HERNIA REPAIR      HYSTERECTOMY      laporoscopic, partial    KNEE ARTHROSCOPY Bilateral     OOPHORECTOMY Left 10/2015    WI GASTROCNEMIUS RECESSION Left 02/24/2021    Procedure: RECESSION GASTROC OPEN;  Surgeon: Nomi Yang DPM;  Location:  MAIN OR;  Service: Podiatry    WI RPR UMBILICAL HRNA 5 YRS/> REDUCIBLE N/A 04/24/2019    Procedure: REPAIR HERNIA UMBILICAL LAPAROSCOPIC W/ ROBOTICS;  Surgeon: Anahi Colon MD;  Location: [facility] Main OR;  Service: General    WI SPHINCTEROTOMY ANAL DIVISION SPHINCTER SPX N/A 08/31/2018    Procedure: EUA, LEFT PARTIAL INTERNAL SPHINCTEROTOMY;  Surgeon: Tien Reyes MD;  Location: [facility] MAIN OR;  Service: Colorectal    WI TNOT ELBOW LATERAL/MEDIAL DEBRIDE OPEN TDN RPR Left 09/20/2022    Procedure: RELEASE EPICONDYLAR ELBOW MEDIAL;  Surgeon: Kyree Winkler MD;  [facility];  Service: Orthopedics    REDUCTION MAMMAPLASTY Bilateral 1999    REPAIR LACERATION Left     left hand-5/18/2009    REPLACEMENT TOTAL KNEE Right     ROTATOR CUFF REPAIR Left     TONSILECTOMY AND ADNOIDECTOMY      US GUIDED MSK PROCEDURE  04/22/2021    VASCULAR SURGERY      VEIN LIGATION Bilateral     WISDOM TOOTH EXTRACTION         Meds/Allergies:    Medications Prior to Admission   Medication    atoMOXetine (STRATTERA) 40 mg capsule    buPROPion (WELLBUTRIN XL) 300 mg 24 hr tablet    cholecalciferol (VITAMIN D3) 1,000 units tablet    cloNIDine (CATAPRES) 0.1 mg tablet    furosemide (LASIX) 20 mg tablet    levothyroxine (Euthyrox) 125 mcg tablet    losartan (COZAAR) 100 MG tablet    lurasidone 60 MG TABS    naltrexone (REVIA) 50 mg tablet    OXcarbazepine (TRILEPTAL) 300 mg tablet    RABEprazole (ACIPHEX) 20 MG tablet    rOPINIRole (REQUIP) 1 mg tablet    rOPINIRole (REQUIP) 2 mg tablet    topiramate (TOPAMAX) 200 MG tablet    trospium chloride (SANCTURA) 20 mg tablet    warfarin (COUMADIN) 5 mg tablet     "cephalexin (KEFLEX) 500 mg capsule    haloperidol decanoate (Haldol Decanoate) 50 mg/mL injection    melatonin 3 mg       Allergies   Allergen Reactions    Percocet [Oxycodone-Acetaminophen] Headache     Severe headaches   (denies issues with Tylenol)    Povidone Iodine Rash     Unsure if betadine or gauze post surgical. Got rash on leg.   Has  Had itchy rashes after every surgery prep and IV insertion    Chlorhexidine Rash     Per pt \"able to use the liquid soap--thinkd reaction from the sponges or wipes\"       Social History:     Marital Status: Single    Substance Use History:   Social History     Substance and Sexual Activity   Alcohol Use Not Currently    Comment: Recovering alcoholic     Social History     Tobacco Use   Smoking Status Former    Current packs/day: 0.00    Average packs/day: 2.0 packs/day for 33.0 years (66.0 ttl pk-yrs)    Types: Cigarettes    Start date: 1985    Quit date: 2018    Years since quittin.0   Smokeless Tobacco Never   Tobacco Comments    Started at age 16.     Social History     Substance and Sexual Activity   Drug Use Yes    Types: Marijuana, Methamphetamines    Comment: 2 months off meth       Family History:    Family History   Problem Relation Age of Onset    Kidney cancer Mother     Diabetes Mother     Depression Mother     Stroke Mother     Arthritis Mother     Cancer Mother     Psychiatric Illness Mother     No Known Problems Father     No Known Problems Sister     No Known Problems Maternal Grandmother     No Known Problems Maternal Grandfather     No Known Problems Paternal Grandmother     No Known Problems Paternal Grandfather     Colon cancer Maternal Uncle     Colon cancer Maternal Uncle     Colon cancer Paternal Uncle     Colon cancer Family     Alcohol abuse Sister     Asthma Sister          OBJECTIVE:    Vitals:   Blood Pressure: 120/59 (24)  Pulse: 85 (24)  Temperature: 97.7 °F (36.5 °C) (24)  Temp Source: Temporal " "(01/03/24 2116)  Respirations: 16 (01/03/24 2116)  Height: 5' 6\" (167.6 cm) (12/01/23 2105)  Weight - Scale: 135 kg (298 lb) (12/31/23 1009)  SpO2: 95 % (01/03/24 2116)    Physical Exam:     General Appearance: Alert, cooperative, no distress.  HEENT: Head normocephalic, atraumatic, without obvious abnormality.  Heart: Normal rate and rhythm.  Lungs: Non-labored breathing. No respiratory distress.  Abdomen: Without distension.  Psychiatric: AAOx3  Lower Extremity:  Vascular:   DP/PT pedal pulses on the left are present. DP/PT pedal pulses on the right are present. CRT brisk at the digits. Pedal hair is present. negative edema noted at bilateral lower extremities. Skin temperature is within normal limits bilaterally.    Musculoskeletal:  MMT is 5/5 in all muscle compartments bilaterally. No gross deformities noted.     Dermatological:  Skin is of normal appearance, temperature, and turgor with no open lesions or ulcerations noted.  -Right hallux with medial boarder of nail incurvated without clinical signs of infection     Neurological:  Gross sensation is intact. Protective sensation is intact. Patient Denies numbness and/or paresthesias.      Additional Data:     Lab Results: I have personally reviewed pertinent labs including:              Invalid input(s): \"LABALBU\"  Results from last 7 days   Lab Units 01/02/24  0924   INR  1.08       Cultures: I have personally reviewed pertinent cultures including:              Imaging: I have personally reviewed pertinent films in PACS.  EKG, Pathology, and Other Studies: I have personally reviewed pertinent reports.     "

## 2024-01-04 NOTE — CASE MANAGEMENT
Received a voicemail from Annemarie with [facility] ([phone]) regarding a status on the patient and the situation with [facility], will call her back tomorrow to update.

## 2024-01-04 NOTE — PROGRESS NOTES
"Progress Note - Behavioral Health   Blanca Mason 53 y.o. female MRN: [id number]  Unit/Bed#: [facility] 651-02 Encounter: [id number]    Patient was seen today for continuation of care, records reviewed and patient was discussed with the morning case review team.    Blanca was seen today for psychiatric follow-up.  On assessment today, Blanca was agitated and demanding.  Stating that she feels mistreated by staff, patient is noted to have received as needed IM Zyprexa 10 mg after throwing her food across the room and spitting out her medications.  Counseled on appropriate behavior, patient became more tearful and voiced to this writer that \" she wants her stuff back\".  Encouraged to use proper coping mechanisms including strategies learned during anger management, patient able to clean her room after consistent redirection.  No medication changes to be made at this time, will give Invega Sustenna 234 mg IM on 1/7/2024 due to increased irritability and low frustration tolerance.  Case management to refer patient to [facility] due to increased suicidality in the community as well as lack of coping skills impairing insight and judgment.    Blanca denies acute suicidal/self-harm ideation/intent/plan upon direct inquiry at this time.    Blanca also denies HI/AH/VH, and does not appear overtly manic.  No overt delusions or paranoia are verbalized. Impulse control is ongoing.  Blanca remains adherent to her current psychotropic medication regimen and denies any side effects from medications, as well as none noted on exam.    Vitals:  Vitals:    01/03/24 2116   BP: 120/59   Pulse: 85   Resp: 16   Temp: 97.7 °F (36.5 °C)   SpO2: 95%       Laboratory Results:  I have personally reviewed all pertinent laboratory/tests results.  Most Recent Labs:   Lab Results   Component Value Date    WBC 8.96 12/02/2023    RBC 4.34 12/02/2023    HGB 12.9 12/02/2023    HCT 40.5 12/02/2023     12/02/2023    RDW 14.2 12/02/2023    TOTANEUTABS 4.73 11/19/2023 "    NEUTROABS 5.77 12/02/2023    SODIUM 139 12/02/2023    K 3.8 12/02/2023     12/02/2023    CO2 21 12/02/2023    BUN 13 12/02/2023    CREATININE 0.62 12/02/2023    GLUC 107 12/02/2023    GLUF 107 (H) 12/02/2023    CALCIUM 8.7 12/02/2023    AST 13 12/02/2023    ALT 13 12/02/2023    ALKPHOS 55 12/02/2023    TP 6.0 (L) 12/02/2023    ALB 3.5 12/02/2023    TBILI 0.25 12/02/2023    CHOLESTEROL 234 (H) 03/09/2023    HDL 71 03/09/2023    TRIG 153 (H) 03/09/2023    LDLCALC 132 (H) 03/09/2023    NONHDLC 163 03/09/2023    VALPROICTOT 23 (L) 11/10/2023    AMMONIA 63 11/06/2023    HEU9ENLKJIDJ 4.324 12/02/2023    FREET4 0.81 11/06/2023    T3FREE 2.27 (L) 10/16/2019    PREGUR negative 07/13/2022    PREGSERUM Negative 11/06/2023    RPR Non-Reactive 07/17/2022    HGBA1C 5.0 01/05/2023    EAG 97 01/05/2023       Psychiatric Review of Systems:  Behavior over the last 24 hours:  unchanged.   Sleep: good  Appetite: good  Medication side effects: none  ROS: no complaints, denies shortness of breath or chest pain and all other systems are negative for acute changes    Mental Status Evaluation:  Appearance:  adequate grooming   Behavior:  agitated, angry, demanding   Speech:  normal rate and volume   Mood:  labile, irritable   Affect:  constricted, tearful   Thought Process:  linear   Thought Content:  no overt delusions   Perceptual Disturbances: denies auditory hallucinations when asked, does not appear responding to internal stimuli   Risk Potential: Suicidal ideation - None  Homicidal ideation - None  Potential for aggression - No   Memory:  recent and remote memory grossly intact   Sensorium  person, place, and time/date      Consciousness:  alert and awake   Attention: attention span and concentration are age appropriate   Insight:  poor   Judgment: poor   Gait/Station: normal gait/station   Motor Activity: no abnormal movements   Progress Toward Goals:   Blanca is progressing towards goals of inpatient psychiatric treatment by  continued medication compliance and is attending therapeutic modalities on the milieu. However, the patient continues to require inpatient psychiatric hospitalization for continued medication management and titration to optimize symptom reduction, improve sleep hygiene, and demonstrate adequate self-care.    Assessment/Plan   Principal Problem:    Schizoaffective disorder, bipolar type (HCC)  Active Problems:    Benign essential hypertension    Edema    Hypothyroidism    MAG (obstructive sleep apnea)    Overactive bladder    Sjogren's syndrome (HCC)    Medical clearance for psychiatric admission    Anxiety    Borderline personality disorder (HCC)    History of pulmonary embolism    Ingrown toenail      Recommended Treatment: Treatment plan and medication changes discussed and per the attending physician the plan is:    1.Continue with group therapy, milieu therapy and occupational therapy  2.Behavioral Health checks every 7 minutes  3.Continue frequent safety checks and vitals per unit protocol  4.Continue with SLIM medical management as indicated  5.Continue with current medication regimen  6.Will review labs in the a.m.  7.Disposition Planning: Discharge planning and efforts remain ongoing    Behavioral Health Medications: all current active meds have been reviewed and continue current psychiatric medications.  Current Facility-Administered Medications   Medication Dose Route Frequency Provider Last Rate    aluminum-magnesium hydroxide-simethicone  30 mL Oral Q4H PRN JHONATAN Fields      atoMOXetine  40 mg Oral Daily Anatoly Prajapati,       buPROPion  450 mg Oral Daily Anatoly Prajapati,       cloNIDine  0.2 mg Oral Q12H JHONATAN Martinez      cyanocobalamin  1,000 mcg Oral Daily JHONATAN Berg      cyanocobalamin  1,000 mcg Intramuscular Q30 Days JHONATAN Berg      ergocalciferol  50,000 Units Oral Weekly JHONATAN Berg      furosemide  20 mg Oral Daily  Wendy Anderson, JHONATAN      hydrOXYzine HCL  25 mg Oral Q6H PRN Max 4/day Virginia Mccullough, CRNP      hydrOXYzine HCL  50 mg Oral Q6H PRN Max 4/day Virginia Mccullough, LAURANP      ibuprofen  200 mg Oral Q6H PRN Virginia Mccullough, LAURANP      ibuprofen  400 mg Oral Q6H PRN Virginia Mccullough, LAURANP      ibuprofen  600 mg Oral Q8H PRN Virginia Mccullough, LAURANP      levothyroxine  125 mcg Oral Early Morning Wendy Anderson, LAURANP      loperamide  2 mg Oral Q4H PRN Anatoly Prajapati, DO      LORazepam  1 mg Intramuscular Q6H PRN Max 3/day Virginia Mccullough, CRNP      LORazepam  1 mg Oral Q6H PRN Max 3/day Virginia Mccullough, JHONATAN      losartan  100 mg Oral Daily Wendy Anderson, LAURANP      melatonin  3 mg Oral HS Frederick Smith MD      naltrexone  50 mg Oral Daily Anatoly Prajapati, DO      OLANZapine  10 mg Oral Q6H PRN Frederick Smith MD      Or    OLANZapine  10 mg Intramuscular Q6H PRN Frederick Smith MD      OLANZapine  5 mg Oral Q6H PRN Frederick Smith MD      Or    OLANZapine  5 mg Intramuscular Q6H PRN Frederick Smith MD      OLANZapine  2.5 mg Oral Q3H PRN Frederick Smith MD      OXcarbazepine  450 mg Oral Q12H [facility] Frederick Smith MD      oxybutynin  5 mg Oral BID JHONATAN Fields      [START ON 1/7/2024] paliperidone  234 mg IM- Deltoid Q4 weeks JHONATAN Anne      pantoprazole  40 mg Oral Early Morning Wendy Anderson, JHONATAN      polyethylene glycol  17 g Oral Daily PRN JHONATAN Anne      saccharomyces boulardii  250 mg Oral BID Anatoly Prajapati, DO      senna-docusate sodium  1 tablet Oral Daily PRN JHONATAN Anne      topiramate  200 mg Oral BID Anatoly Prajapati, DO      traZODone  50 mg Oral HS PRN JHONATAN Anne      warfarin  6 mg Oral Daily (warfarin) JHONATAN Berg         Risks / Benefits of Treatment:  Risks, benefits, and possible side effects of medications explained to patient and patient verbalizes understanding and agreement for  treatment.    Counseling / Coordination of Care:  Patient's progress reviewed with nursing staff.  Medications, treatment progress and treatment plan reviewed with patient.  Supportive counseling provided to the patient.    Total floor/unit time spent today 25 minutes. Greater than 50% of total time was spent with the patient and / or family counseling and / or coordination of care. A description of the counseling / coordination of care: medication education, treatment plan, supportive therapy.    This note was completed in part utilizing Dragon dictation Software. Grammatical, translation, syntax errors, random word insertions, spelling mistakes, and incomplete sentences may be an occasional consequence of this system secondary to software limitations with voice recognition, ambient noise, and hardware issues. If you have any questions or concerns about the content, text, or information contained within the body of this dictation, please contact the provider for clarification

## 2024-01-05 PROCEDURE — 99232 SBSQ HOSP IP/OBS MODERATE 35: CPT | Performed by: STUDENT IN AN ORGANIZED HEALTH CARE EDUCATION/TRAINING PROGRAM

## 2024-01-05 RX ADMIN — OXYBUTYNIN CHLORIDE 5 MG: 5 TABLET ORAL at 08:17

## 2024-01-05 RX ADMIN — PANTOPRAZOLE SODIUM 40 MG: 40 TABLET, DELAYED RELEASE ORAL at 06:22

## 2024-01-05 RX ADMIN — LOSARTAN POTASSIUM 100 MG: 50 TABLET, FILM COATED ORAL at 08:17

## 2024-01-05 RX ADMIN — LEVOTHYROXINE SODIUM 125 MCG: 125 TABLET ORAL at 06:22

## 2024-01-05 RX ADMIN — OXCARBAZEPINE 450 MG: 150 TABLET, FILM COATED ORAL at 08:17

## 2024-01-05 RX ADMIN — CLONIDINE HYDROCHLORIDE 0.2 MG: 0.1 TABLET ORAL at 21:43

## 2024-01-05 RX ADMIN — OXYBUTYNIN CHLORIDE 5 MG: 5 TABLET ORAL at 17:18

## 2024-01-05 RX ADMIN — ATOMOXETINE 40 MG: 40 CAPSULE ORAL at 08:16

## 2024-01-05 RX ADMIN — MELATONIN TAB 3 MG 3 MG: 3 TAB at 21:43

## 2024-01-05 RX ADMIN — NALTREXONE HYDROCHLORIDE 50 MG: 50 TABLET, FILM COATED ORAL at 08:17

## 2024-01-05 RX ADMIN — Medication 250 MG: at 08:16

## 2024-01-05 RX ADMIN — CLONIDINE HYDROCHLORIDE 0.2 MG: 0.1 TABLET ORAL at 08:17

## 2024-01-05 RX ADMIN — FUROSEMIDE 20 MG: 20 TABLET ORAL at 08:17

## 2024-01-05 RX ADMIN — OXCARBAZEPINE 450 MG: 150 TABLET, FILM COATED ORAL at 21:43

## 2024-01-05 RX ADMIN — Medication 250 MG: at 17:18

## 2024-01-05 RX ADMIN — HYDROXYZINE HYDROCHLORIDE 50 MG: 50 TABLET, FILM COATED ORAL at 02:57

## 2024-01-05 RX ADMIN — WARFARIN SODIUM 6 MG: 2 TABLET ORAL at 17:18

## 2024-01-05 RX ADMIN — BUPROPION HYDROCHLORIDE 450 MG: 300 TABLET, EXTENDED RELEASE ORAL at 08:16

## 2024-01-05 RX ADMIN — TOPIRAMATE 200 MG: 100 TABLET, FILM COATED ORAL at 17:18

## 2024-01-05 RX ADMIN — CYANOCOBALAMIN TAB 1000 MCG 1000 MCG: 1000 TAB at 08:17

## 2024-01-05 RX ADMIN — TOPIRAMATE 200 MG: 100 TABLET, FILM COATED ORAL at 08:16

## 2024-01-05 NOTE — NURSING NOTE
The patient requested medication for anxiety. Patient was given Atarax 50 mg at 0257 for a minaya of 23. Continue to monitor.

## 2024-01-05 NOTE — NURSING NOTE
The patient got up once during the night. She was given PRN medication, then fell back asleep. Continue to monitor

## 2024-01-05 NOTE — TREATMENT TEAM
Pt attended al groups.  Pt more engaged in afternoon.  Pt requested further worksheets on anger management which were provided by request.  Pt indicated she has an arrow head collection which makes her happy.  Pt did note her physical limitations and changes since having knee surgery.   Pt expressed concern about not being able to do those things anymore.      01/05/24 1330   Activity/Group Checklist   Group Other (Comment)  (positive reflection)   Attendance Attended   Attendance Duration (min) 31-45   Interactions Interacted appropriately   Affect/Mood Appropriate   Goals Achieved Identified feelings;Identified relapse prevention strategies;Discussed self-esteem issues;Discussed coping strategies;Able to reflect/comment on own behavior;Able to engage in interactions;Able to listen to others;Able to manage/cope with feelings;Verbalized increased hopefulness;Displayed empathy;Increased hopefulness

## 2024-01-05 NOTE — NURSING NOTE
Pt pleasant and med-compliant with no agitation noted.  Good appetite and steady gait.  Attended group.  VSS.  Denied SI.  Monitored for safety and support.

## 2024-01-05 NOTE — PLAN OF CARE
Problem: Ineffective Coping  Goal: Identifies ineffective coping skills  Outcome: Progressing  Goal: Identifies healthy coping skills  Outcome: Not Progressing  Goal: Demonstrates healthy coping skills  Outcome: Not Progressing  Goal: Participates in unit activities  Description: Interventions:  - Provide therapeutic environment   - Provide required programming   - Redirect inappropriate behaviors   Outcome: Not Progressing  Goal: Patient/Family participate in treatment and DC plans  Description: Interventions:  - Provide therapeutic environment  Outcome: Not Progressing  Goal: Patient/Family verbalizes awareness of resources  Outcome: Progressing  Goal: Understands least restrictive measures  Description: Interventions:  - Utilize least restrictive behavior  Outcome: Progressing  Goal: Free from restraint events  Description: - Utilize least restrictive measures   - Provide behavioral interventions   - Redirect inappropriate behaviors   Outcome: Progressing     Problem: Risk for Self Injury/Neglect  Goal: Treatment Goal: Remain safe during length of stay, learn and adopt new coping skills, and be free of self-injurious ideation, impulses and acts at the time of discharge  Outcome: Progressing  Goal: Verbalize thoughts and feelings  Description: Interventions:  - Assess and re-assess patient's lethality and potential for self-injury  - Engage patient in 1:1 interactions, daily, for a minimum of 15 minutes  - Encourage patient to express feelings, fears, frustrations, hopes  - Establish rapport/trust with patient   Outcome: Progressing  Goal: Refrain from harming self  Description: Interventions:  - Monitor patient closely, per order  - Develop a trusting relationship  - Supervise medication ingestion, monitor effects and side effects   Outcome: Progressing  Goal: Attend and participate in unit activities, including therapeutic, recreational, and educational groups  Description: Interventions:  - Provide  therapeutic and educational activities daily, encourage attendance and participation, and document same in the medical record  - Obtain collateral information, encourage visitation and family involvement in care   Outcome: Progressing  Goal: Recognize maladaptive responses and adopt new coping mechanisms  Outcome: Progressing  Goal: Complete daily ADLs, including personal hygiene independently, as able  Description: Interventions:  - Observe, teach, and assist patient with ADLS  - Monitor and promote a balance of rest/activity, with adequate nutrition and elimination  Outcome: Progressing     Problem: Depression  Goal: Treatment Goal: Demonstrate behavioral control of depressive symptoms, verbalize feelings of improved mood/affect, and adopt new coping skills prior to discharge  Outcome: Progressing  Goal: Verbalize thoughts and feelings  Description: Interventions:  - Assess and re-assess patient's level of risk   - Engage patient in 1:1 interactions, daily, for a minimum of 15 minutes   - Encourage patient to express feelings, fears, frustrations, hopes   Outcome: Progressing  Goal: Refrain from harming self  Description: Interventions:  - Monitor patient closely, per order   - Supervise medication ingestion, monitor effects and side effects   Outcome: Progressing  Goal: Refrain from isolation  Description: Interventions:  - Develop a trusting relationship   - Encourage socialization   Outcome: Progressing  Goal: Refrain from self-neglect  Outcome: Progressing  Goal: Attend and participate in unit activities, including therapeutic, recreational, and educational groups  Description: Interventions:  - Provide therapeutic and educational activities daily, encourage attendance and participation, and document same in the medical record   Outcome: Progressing  Goal: Complete daily ADLs, including personal hygiene independently, as able  Description: Interventions:  - Observe, teach, and assist patient with ADLS  -   Monitor and promote a balance of rest/activity, with adequate nutrition and elimination   Outcome: Progressing

## 2024-01-05 NOTE — PROGRESS NOTES
01/05/24 0855   Team Meeting   Meeting Type Daily Rounds   Initial Conference Date 01/05/24   Next Conference Date 01/08/24   Team Members Present   Team Members Present Physician;Nurse;;Occupational Therapist;   Physician Team Member SHEFALI Rodriguez J. Stives   Nursing Team Member Jennifer   Care Management Team Member Atiya   Social Work Team Member Dalia   OT Team Member Yeison   Patient/Family Present   Patient Present No   Patient's Family Present No     Atarax 50 at 2 am, tearful all day, upset that ACT team didn't come and see her and then they didn't call, has coloring books taken for her behaviors, discharge pending to [facility].

## 2024-01-05 NOTE — NURSING NOTE
Pt withdrawn and slightly tearful. Pt expressed poor behavioral skills today. Feels peers and staff test her limits and she can't control herself at time. Pt hoping tomorrow is a better day for her and that she will find ways to cope so she can go home. Appetite good. Medication compliant. Denies SI/HI.

## 2024-01-05 NOTE — TREATMENT TEAM
01/05/24 1100   Activity/Group Checklist   Group Life Skills  (seated exercise and trivia challenge.)   Attendance Attended   Attendance Duration (min) 16-30   Interactions Interacted appropriately   Affect/Mood Appropriate;Normal range   Goals Achieved Able to listen to others;Able to engage in interactions

## 2024-01-05 NOTE — CASE MANAGEMENT
Called and spoke to Annemarie ([phone]), the ACT team therapist -  to advise of the [facility] referral being in and with an update on Blanca's progress. Advised she will be here for a bit awaiting a decision on [facility] placement.

## 2024-01-05 NOTE — PROGRESS NOTES
Progress Note - Behavioral Health   Blanca Mason 53 y.o. female MRN: [id number]  Unit/Bed#: [facility] 651-02 Encounter: [id number]    Patient was seen today for continuation of care, records reviewed and patient was discussed with the morning case review team.    Blanca was seen today for psychiatric follow-up.  On assessment today, Blanca was irritable and evasive.  Scant in conversation today, patient states that she is doing well.  Encouraged to continue working on anger management, patient counseled again on unit rules and appropriate behavior.  Invega Sustenna to be given on 1/7/2024 due to increased irritability and low frustration tolerance, patient tolerating all medication changes well.  Discharge disposition ongoing as patient will need referral to the [facility] due to limited judgment and elevated suicidality in the community.    Blanca denies acute suicidal/self-harm ideation/intent/plan upon direct inquiry at this time.  Blanca remains behaviorally appropriate, no agitation or aggression noted on exam or in report.  Blanca also denies HI/AH/VH, and does not appear overtly manic.  No overt delusions or paranoia are verbalized. Impulse control is intact.  Blanca remains adherent to her current psychotropic medication regimen and denies any side effects from medications, as well as none noted on exam.    Vitals:  Vitals:    01/05/24 0801   BP: 148/77   Pulse: 82   Resp: 18   Temp: (!) 96.7 °F (35.9 °C)   SpO2: 98%       Laboratory Results:  I have personally reviewed all pertinent laboratory/tests results.  Most Recent Labs:   Lab Results   Component Value Date    WBC 8.96 12/02/2023    RBC 4.34 12/02/2023    HGB 12.9 12/02/2023    HCT 40.5 12/02/2023     12/02/2023    RDW 14.2 12/02/2023    TOTANEUTABS 4.73 11/19/2023    NEUTROABS 5.77 12/02/2023    SODIUM 139 12/02/2023    K 3.8 12/02/2023     12/02/2023    CO2 21 12/02/2023    BUN 13 12/02/2023    CREATININE 0.62 12/02/2023    GLUC 107 12/02/2023    GLUF 107  (H) 12/02/2023    CALCIUM 8.7 12/02/2023    AST 13 12/02/2023    ALT 13 12/02/2023    ALKPHOS 55 12/02/2023    TP 6.0 (L) 12/02/2023    ALB 3.5 12/02/2023    TBILI 0.25 12/02/2023    CHOLESTEROL 234 (H) 03/09/2023    HDL 71 03/09/2023    TRIG 153 (H) 03/09/2023    LDLCALC 132 (H) 03/09/2023    NONHDLC 163 03/09/2023    VALPROICTOT 23 (L) 11/10/2023    AMMONIA 63 11/06/2023    [id number] 4.324 12/02/2023    FREET4 0.81 11/06/2023    T3FREE 2.27 (L) 10/16/2019    PREGUR negative 07/13/2022    PREGSERUM Negative 11/06/2023    RPR Non-Reactive 07/17/2022    HGBA1C 5.0 01/05/2023    EAG 97 01/05/2023       Psychiatric Review of Systems:  Behavior over the last 24 hours:  unchanged.   Sleep: good  Appetite: good  Medication side effects: none  ROS: no complaints, denies shortness of breath or chest pain and all other systems are negative for acute changes    Mental Status Evaluation:  Appearance:  dressed appropriately   Behavior:  demanding, guarded   Speech:  normal rate and volume   Mood:  labile   Affect:  constricted   Thought Process:  linear   Thought Content:  no overt delusions   Perceptual Disturbances: denies auditory hallucinations when asked, does not appear responding to internal stimuli   Risk Potential: Suicidal ideation - None  Homicidal ideation - None  Potential for aggression - No   Memory:  recent and remote memory grossly intact   Sensorium  person, place, and time/date      Consciousness:  alert and awake   Attention: attention span and concentration are age appropriate   Insight:  partial   Judgment: partial   Gait/Station: normal gait/station   Motor Activity: no abnormal movements   Progress Toward Goals:   Blanca is progressing towards goals of inpatient psychiatric treatment by continued medication compliance and is attending therapeutic modalities on the milieu. However, the patient continues to require inpatient psychiatric hospitalization for continued medication management and titration to  optimize symptom reduction, improve sleep hygiene, and demonstrate adequate self-care.    Assessment/Plan   Principal Problem:    Schizoaffective disorder, bipolar type (HCC)  Active Problems:    Benign essential hypertension    Edema    Hypothyroidism    MAG (obstructive sleep apnea)    Overactive bladder    Sjogren's syndrome (HCC)    Medical clearance for psychiatric admission    Anxiety    Borderline personality disorder (HCC)    History of pulmonary embolism    Ingrown toenail      Recommended Treatment: Treatment plan and medication changes discussed and per the attending physician the plan is:    1.Continue with group therapy, milieu therapy and occupational therapy  2.Behavioral Health checks every 7 minutes  3.Continue frequent safety checks and vitals per unit protocol  4.Continue with SLIM medical management as indicated  5.Continue with current medication regimen  6.Will review labs in the a.m.  7.Disposition Planning: Discharge planning and efforts remain ongoing    Behavioral Health Medications: all current active meds have been reviewed and continue current psychiatric medications.  Current Facility-Administered Medications   Medication Dose Route Frequency Provider Last Rate    aluminum-magnesium hydroxide-simethicone  30 mL Oral Q4H PRN JHONATAN Fields      atoMOXetine  40 mg Oral Daily Anatoly Prajapati, DO      buPROPion  450 mg Oral Daily Anatoly Prajapati, DO      cloNIDine  0.2 mg Oral Q12H [facility] JHONATAN Anne      cyanocobalamin  1,000 mcg Oral Daily JHONATAN Berg      cyanocobalamin  1,000 mcg Intramuscular Q30 Days JHONATAN Berg      ergocalciferol  50,000 Units Oral Weekly JHONATAN Berg      furosemide  20 mg Oral Daily JHONATAN Fields      hydrOXYzine HCL  25 mg Oral Q6H PRN Max 4/day LAURA AnneNP      hydrOXYzine HCL  50 mg Oral Q6H PRN Max 4/day JHONATAN Anne      ibuprofen  200 mg Oral Q6H PRN Virginia  Jamel, JHONATAN      ibuprofen  400 mg Oral Q6H PRN Virginia Mccullough, JHONATAN      ibuprofen  600 mg Oral Q8H PRN JHONATAN Anne      levothyroxine  125 mcg Oral Early Morning Wendy Anderson, JHONATAN      loperamide  2 mg Oral Q4H PRN Anatoly Prajapati, DO      LORazepam  1 mg Intramuscular Q6H PRN Max 3/day JHONATAN Anne      LORazepam  1 mg Oral Q6H PRN Max 3/day JHONATAN Anne      losartan  100 mg Oral Daily Wendy Anderson, JHONATAN      melatonin  3 mg Oral HS Frederick Smith MD      naltrexone  50 mg Oral Daily Anatoly Prajapati, DO      OLANZapine  10 mg Oral Q6H PRN Frederick Smith MD      Or    OLANZapine  10 mg Intramuscular Q6H PRN Frederick Smith MD      OLANZapine  5 mg Oral Q6H PRN Frederick Smith MD      Or    OLANZapine  5 mg Intramuscular Q6H PRN Frederick Smith MD      OLANZapine  2.5 mg Oral Q3H PRN Frederick Smith MD      OXcarbazepine  450 mg Oral Q12H [facility] Frederick Smith MD      oxybutynin  5 mg Oral BID JHONATAN Fields      [START ON 1/7/2024] paliperidone  234 mg IM- Deltoid Q4 weeks JHONATAN Anne      pantoprazole  40 mg Oral Early Morning JHONATAN Fields      polyethylene glycol  17 g Oral Daily PRN JHONATAN Anne      saccharomyces boulardii  250 mg Oral BID Anatoly Prajapati, DO      senna-docusate sodium  1 tablet Oral Daily PRN JHONATAN Anne      topiramate  200 mg Oral BID Anatoly Prajapati, DO      traZODone  50 mg Oral HS PRN JHONATAN Anne      warfarin  6 mg Oral Daily (warfarin) JHONATAN Berg         Risks / Benefits of Treatment:  Risks, benefits, and possible side effects of medications explained to patient and patient verbalizes understanding and agreement for treatment.    Counseling / Coordination of Care:  Patient's progress reviewed with nursing staff.  Medications, treatment progress and treatment plan reviewed with patient.  Supportive counseling provided to the patient.    Total  floor/unit time spent today 25 minutes. Greater than 50% of total time was spent with the patient and / or family counseling and / or coordination of care. A description of the counseling / coordination of care: medication education, treatment plan, supportive therapy.    This note was completed in part utilizing Dragon dictation Software. Grammatical, translation, syntax errors, random word insertions, spelling mistakes, and incomplete sentences may be an occasional consequence of this system secondary to software limitations with voice recognition, ambient noise, and hardware issues. If you have any questions or concerns about the content, text, or information contained within the body of this dictation, please contact the provider for clarification

## 2024-01-06 PROCEDURE — 99232 SBSQ HOSP IP/OBS MODERATE 35: CPT | Performed by: PSYCHIATRY & NEUROLOGY

## 2024-01-06 RX ADMIN — OXCARBAZEPINE 450 MG: 150 TABLET, FILM COATED ORAL at 21:27

## 2024-01-06 RX ADMIN — Medication 250 MG: at 09:04

## 2024-01-06 RX ADMIN — LOSARTAN POTASSIUM 100 MG: 50 TABLET, FILM COATED ORAL at 09:04

## 2024-01-06 RX ADMIN — Medication 250 MG: at 17:20

## 2024-01-06 RX ADMIN — OXYBUTYNIN CHLORIDE 5 MG: 5 TABLET ORAL at 09:04

## 2024-01-06 RX ADMIN — ATOMOXETINE 40 MG: 40 CAPSULE ORAL at 09:04

## 2024-01-06 RX ADMIN — FUROSEMIDE 20 MG: 20 TABLET ORAL at 09:04

## 2024-01-06 RX ADMIN — PANTOPRAZOLE SODIUM 40 MG: 40 TABLET, DELAYED RELEASE ORAL at 05:50

## 2024-01-06 RX ADMIN — TOPIRAMATE 200 MG: 100 TABLET, FILM COATED ORAL at 17:20

## 2024-01-06 RX ADMIN — CLONIDINE HYDROCHLORIDE 0.2 MG: 0.1 TABLET ORAL at 21:27

## 2024-01-06 RX ADMIN — WARFARIN SODIUM 6 MG: 2 TABLET ORAL at 17:20

## 2024-01-06 RX ADMIN — NALTREXONE HYDROCHLORIDE 50 MG: 50 TABLET, FILM COATED ORAL at 09:04

## 2024-01-06 RX ADMIN — BUPROPION HYDROCHLORIDE 450 MG: 300 TABLET, EXTENDED RELEASE ORAL at 09:04

## 2024-01-06 RX ADMIN — TOPIRAMATE 200 MG: 100 TABLET, FILM COATED ORAL at 09:04

## 2024-01-06 RX ADMIN — HYDROXYZINE HYDROCHLORIDE 50 MG: 50 TABLET, FILM COATED ORAL at 12:59

## 2024-01-06 RX ADMIN — CYANOCOBALAMIN TAB 1000 MCG 1000 MCG: 1000 TAB at 09:05

## 2024-01-06 RX ADMIN — OXCARBAZEPINE 450 MG: 150 TABLET, FILM COATED ORAL at 09:04

## 2024-01-06 RX ADMIN — OXYBUTYNIN CHLORIDE 5 MG: 5 TABLET ORAL at 17:20

## 2024-01-06 RX ADMIN — CLONIDINE HYDROCHLORIDE 0.2 MG: 0.1 TABLET ORAL at 09:04

## 2024-01-06 RX ADMIN — MELATONIN TAB 3 MG 3 MG: 3 TAB at 21:27

## 2024-01-06 RX ADMIN — LEVOTHYROXINE SODIUM 125 MCG: 125 TABLET ORAL at 05:50

## 2024-01-06 NOTE — PROGRESS NOTES
"    Psychiatric Progress Note - Department of Behavioral Health   Blanca Mason 53 y.o. female MRN: [id number]  Unit/Bed#: [facility] 651-02 Encounter: [id number]    ASSESSMENT & PLAN     Diagnoses:   Principal Problem:    Schizoaffective disorder, bipolar type (HCC)  Active Problems:    Benign essential hypertension    Edema    Hypothyroidism    MAG (obstructive sleep apnea)    Overactive bladder    Sjogren's syndrome (HCC)    Medical clearance for psychiatric admission    Anxiety    Borderline personality disorder (HCC)    History of pulmonary embolism    Ingrown toenail      Treatment Recommendations/Precautions:  Continue to promote patient participation in therapeutic milieu.  Continue medical management per medicine.  Continue previously prescribed psychotropic medication regimen; see below.  Continue behavioral health checks q.7 minutes.   Continue vitals per behavioral health unit protocol.  Discharge date per primary team; 201 commitment status.    SUBJECTIVE     Patient evaluated this a.m. for continuity of care. Patient was discussed at length with nursing and treatment team. Per nursing, patient remains calm, cooperative, although isolative in the milieu secondary to COVID-19 precautions, adherent to her medications without any acute adverse effects. No acute distress is noted throughout evaluation. Blanca Mason denies suicidal/homicidal ideation in addition to thoughts of self-injury, receptive to crisis planning provided by this writer, contacting for safety in the inpatient setting, admitting to an ability to appropriately confide in staff including this writer.  Patient denies any/all psychiatric complaints/concerns including dysphoric mood like depression and anxiety despite possessing previous \" crying spells\" per staff, perseverating on discharge disposition, receptive to psychoeducation and supportive therapy provided by this writer    PSYCHIATRIC REVIEW OF SYSTEMS     Behavior over the last 24 " "hours:  unchanged  Sleep: adequate  Appetite: adequate  Medication side effects: No    REVIEW OF SYSTEMS   Review of systems: no complaints    OBJECTIVE     Vital Signs in Past 24 Hours:  Temp:  [96.5 °F (35.8 °C)-98.2 °F (36.8 °C)] 96.5 °F (35.8 °C)  HR:  [70-81] 70  Resp:  [17-18] 17  BP: (121-132)/(58-60) 121/60    Intake/Output in Past 24 hours:  I/O last 3 completed shifts:  In: 840 [P.O.:840]  Out: -   I/O this shift:  In: 360 [P.O.:360]  Out: -         Laboratory Results:  I have personally reviewed all pertinent laboratory/tests results.  Most Recent Labs:   Lab Results   Component Value Date    WBC 8.96 12/02/2023    RBC 4.34 12/02/2023    HGB 12.9 12/02/2023    HCT 40.5 12/02/2023     12/02/2023    RDW 14.2 12/02/2023    TOTANEUTABS 4.73 11/19/2023    NEUTROABS 5.77 12/02/2023    SODIUM 139 12/02/2023    K 3.8 12/02/2023     12/02/2023    CO2 21 12/02/2023    BUN 13 12/02/2023    CREATININE 0.62 12/02/2023    GLUC 107 12/02/2023    GLUF 107 (H) 12/02/2023    CALCIUM 8.7 12/02/2023    AST 13 12/02/2023    ALT 13 12/02/2023    ALKPHOS 55 12/02/2023    TP 6.0 (L) 12/02/2023    ALB 3.5 12/02/2023    TBILI 0.25 12/02/2023    CHOLESTEROL 234 (H) 03/09/2023    HDL 71 03/09/2023    TRIG 153 (H) 03/09/2023    LDLCALC 132 (H) 03/09/2023    NONHDLC 163 03/09/2023    VALPROICTOT 23 (L) 11/10/2023    AMMONIA 63 11/06/2023    [id number] 4.324 12/02/2023    FREET4 0.81 11/06/2023    T3FREE 2.27 (L) 10/16/2019    PREGUR negative 07/13/2022    PREGSERUM Negative 11/06/2023    RPR Non-Reactive 07/17/2022    HGBA1C 5.0 01/05/2023    EAG 97 01/05/2023     Labs in last 72 hours: No results for input(s): \"WBC\", \"RBC\", \"HGB\", \"HCT\", \"PLT\", \"RDW\", \"TOTANEUTABS\", \"NEUTROABS\", \"SODIUM\", \"K\", \"CL\", \"CO2\", \"BUN\", \"CREATININE\", \"GLUC\", \"GLUF\", \"CALCIUM\", \"AST\", \"ALT\", \"ALKPHOS\", \"TP\", \"ALB\", \"TBILI\", \"CHOLESTEROL\", \"HDL\", \"TRIG\", \"LDLCALC\", \"VALPROICTOT\", \"CARBAMAZEPIN\", \"LITHIUM\", \"AMMONIA\", \"[id number]\", \"FREET4\", " "\"T3FREE\", \"PREGTESTUR\", \"PREGSERUM\", \"HCG\", \"HCGQUANT\", \"RPR\" in the last 72 hours.  Admission Labs:   Admission on 12/01/2023   Component Date Value    Sodium 12/02/2023 139     Potassium 12/02/2023 3.8     Chloride 12/02/2023 107     CO2 12/02/2023 21     ANION GAP 12/02/2023 11     BUN 12/02/2023 13     Creatinine 12/02/2023 0.62     Glucose 12/02/2023 107     Glucose, Fasting 12/02/2023 107 (H)     Calcium 12/02/2023 8.7     AST 12/02/2023 13     ALT 12/02/2023 13     Alkaline Phosphatase 12/02/2023 55     Total Protein 12/02/2023 6.0 (L)     Albumin 12/02/2023 3.5     Total Bilirubin 12/02/2023 0.25     eGFR 12/02/2023 104     Magnesium 12/02/2023 2.3     Phosphorus 12/02/2023 3.4     WBC 12/02/2023 8.96     RBC 12/02/2023 4.34     Hemoglobin 12/02/2023 12.9     Hematocrit 12/02/2023 40.5     MCV 12/02/2023 93     MCH 12/02/2023 29.7     MCHC 12/02/2023 31.9     RDW 12/02/2023 14.2     MPV 12/02/2023 10.9     Platelets 12/02/2023 164     nRBC 12/02/2023 0     Neutrophils Relative 12/02/2023 65     Immat GRANS % 12/02/2023 1     Lymphocytes Relative 12/02/2023 25     Monocytes Relative 12/02/2023 8     Eosinophils Relative 12/02/2023 1     Basophils Relative 12/02/2023 0     Neutrophils Absolute 12/02/2023 5.77     Immature Grans Absolute 12/02/2023 0.10     Lymphocytes Absolute 12/02/2023 2.24     Monocytes Absolute 12/02/2023 0.75     Eosinophils Absolute 12/02/2023 0.06     Basophils Absolute 12/02/2023 0.04     TSH 3RD GENERATON 12/02/2023 4.324     Vitamin B-12 12/02/2023 235     Folate 12/02/2023 21.0     Vit D, 25-Hydroxy 12/02/2023 19.3 (L)     Ventricular Rate 12/01/2023 77     Atrial Rate 12/01/2023 77     MT Interval 12/01/2023 180     QRSD Interval 12/01/2023 90     QT Interval 12/01/2023 388     QTC Interval 12/01/2023 439     P Axis 12/01/2023 47     QRS [place] 12/01/2023 -12     T Wave [place] 12/01/2023 18     Protime 12/03/2023 26.0 (H)     INR 12/03/2023 2.35 (H)     Oxcarbazepine 12/05/2023 8 (L)  "    QFT Nil 12/05/2023 <0.00     QFT TB1-NIL 12/05/2023 0.06     QFT TB2-NIL 12/05/2023 0.05     QFT Mitogen-NIL 12/05/2023 10.00     QFT Final Interpretation 12/05/2023 Negative     Ventricular Rate 12/05/2023 72     Atrial Rate 12/05/2023 72     PA Interval 12/05/2023 194     QRSD Interval 12/05/2023 94     QT Interval 12/05/2023 374     QTC Interval 12/05/2023 409     P Axis 12/05/2023 50     QRS [place] 12/05/2023 -18     T Wave Axis 12/05/2023 8     Protime 12/06/2023 44.4 (H)     INR 12/06/2023 4.70 (H)     Protime 12/08/2023 37.1 (H)     INR 12/08/2023 3.72 (H)     Protime 12/09/2023 22.9 (H)     INR 12/09/2023 1.99 (H)     Oxcarbazepine 12/12/2023 14     Protime 12/12/2023 25.8 (H)     INR 12/12/2023 2.33 (H)     Protime 12/15/2023 36.2 (H)     INR 12/15/2023 3.61 (H)     Protime 12/17/2023 20.0 (H)     INR 12/17/2023 1.67 (H)     Protime 12/18/2023 17.1 (H)     INR 12/18/2023 1.36 (H)     Protime 12/19/2023 19.0 (H)     INR 12/19/2023 1.56 (H)     Protime 12/20/2023 22.9 (H)     INR 12/20/2023 1.99 (H)     Protime 12/22/2023 26.6 (H)     INR 12/22/2023 2.42 (H)     Protime 12/25/2023 35.1 (H)     INR 12/25/2023 3.46 (H)     Protime 12/26/2023 35.2 (H)     INR 12/26/2023 3.48 (H)     Protime 12/27/2023 25.3 (H)     INR 12/27/2023 2.26 (H)     Protime 12/30/2023 22.0 (H)     INR 12/30/2023 1.89 (H)     Protime 01/02/2024 14.3     INR 01/02/2024 1.08     SARS-CoV-2 01/02/2024 Positive (A)     INFLUENZA A PCR 01/02/2024 Negative     INFLUENZA B PCR 01/02/2024 Negative     RSV PCR 01/02/2024 Negative        Behavioral Health Medications: all current active meds have been reviewed, continue current psychiatric medications, and current meds:   Current Facility-Administered Medications   Medication Dose Route Frequency    aluminum-magnesium hydroxide-simethicone (MAALOX) oral suspension 30 mL  30 mL Oral Q4H PRN    atoMOXetine (STRATTERA) capsule 40 mg  40 mg Oral Daily    buPROPion (WELLBUTRIN XL) 24 hr tablet 450 mg   450 mg Oral Daily    cloNIDine (CATAPRES) tablet 0.2 mg  0.2 mg Oral Q12H TASHA    cyanocobalamin (VITAMIN B-12) tablet 1,000 mcg  1,000 mcg Oral Daily    cyanocobalamin injection 1,000 mcg  1,000 mcg Intramuscular Q30 Days    ergocalciferol (VITAMIN D2) capsule 50,000 Units  50,000 Units Oral Weekly    furosemide (LASIX) tablet 20 mg  20 mg Oral Daily    hydrOXYzine HCL (ATARAX) tablet 25 mg  25 mg Oral Q6H PRN Max 4/day    hydrOXYzine HCL (ATARAX) tablet 50 mg  50 mg Oral Q6H PRN Max 4/day    ibuprofen (MOTRIN) tablet 200 mg  200 mg Oral Q6H PRN    ibuprofen (MOTRIN) tablet 400 mg  400 mg Oral Q6H PRN    ibuprofen (MOTRIN) tablet 600 mg  600 mg Oral Q8H PRN    levothyroxine tablet 125 mcg  125 mcg Oral Early Morning    loperamide (IMODIUM) capsule 2 mg  2 mg Oral Q4H PRN    LORazepam (ATIVAN) injection 1 mg  1 mg Intramuscular Q6H PRN Max 3/day    LORazepam (ATIVAN) tablet 1 mg  1 mg Oral Q6H PRN Max 3/day    losartan (COZAAR) tablet 100 mg  100 mg Oral Daily    melatonin tablet 3 mg  3 mg Oral HS    naltrexone (REVIA) tablet 50 mg  50 mg Oral Daily    OLANZapine (ZyPREXA) tablet 10 mg  10 mg Oral Q6H PRN    Or    OLANZapine (ZyPREXA) IM injection 10 mg  10 mg Intramuscular Q6H PRN    OLANZapine (ZyPREXA) tablet 5 mg  5 mg Oral Q6H PRN    Or    OLANZapine (ZyPREXA) IM injection 5 mg  5 mg Intramuscular Q6H PRN    OLANZapine (ZyPREXA) tablet 2.5 mg  2.5 mg Oral Q3H PRN    OXcarbazepine (TRILEPTAL) tablet 450 mg  450 mg Oral Q12H TASHA    oxybutynin (DITROPAN) tablet 5 mg  5 mg Oral BID    [START ON 1/7/2024] paliperidone palmitate ER (INVEGA) IM- Deltoid injection 234 mg  234 mg IM- Deltoid Q4 weeks    pantoprazole (PROTONIX) EC tablet 40 mg  40 mg Oral Early Morning    polyethylene glycol (MIRALAX) packet 17 g  17 g Oral Daily PRN    saccharomyces boulardii (FLORASTOR) capsule 250 mg  250 mg Oral BID    senna-docusate sodium (SENOKOT S) 8.6-50 mg per tablet 1 tablet  1 tablet Oral Daily PRN    topiramate (TOPAMAX)  tablet 200 mg  200 mg Oral BID    traZODone (DESYREL) tablet 50 mg  50 mg Oral HS PRN    warfarin (COUMADIN) tablet 6 mg  6 mg Oral Daily (warfarin)   .    Risks, benefits and possible side effects of Medications:   Risks, benefits, and possible side effects of medications explained to patient and patient verbalizes understanding.      Mental Status Evaluation:  Appearance:  age appropriate, casually dressed, disheveled, and possessing appropriate hygiene, marginal grooming   Behavior:  Predominantly calm and cooperative although argumentative at times possessing appropriate eye contact   Speech:  normal pitch and normal volume   Mood:  euthymic   Affect:  increased in intensity and labile   Language naming objects and repeating phrases   Thought Process:  goal directed   Thought Content:  no overt obsessions or delusions   Perceptual Disturbances: None   Risk Potential: Suicidal Ideations none, Homicidal Ideations none, and Potential for Aggression No   Sensorium:  person, place, and time/date   Cognition:  recent and remote memory grossly intact   Consciousness:  alert and awake    Attention: attention span appeared shorter than expected for age   Insight:  limited   Judgment: limited   Intellect Not assessed   Gait/Station: Not assessed; sitting upright in chair   Motor Activity: no abnormal movements     Memory: Short and long term memory  fair     Progress Toward Goals: unchanged, as evidenced by their participation (or lack thereof) in individual, social and therapeutic milieu in addition to adherence to their medication regimen.    Recommended Treatment:   See above for assessment and plan.    Inpatient Psychiatric Certification: Based upon physical, mental and social evaluations, I certify that inpatient psychiatric services are medically necessary for this patient for a duration of  greater than 2  midnights for the treatment of Schizoaffective disorder, bipolar type (HCC) including psychotropic medication  management, participation in therapeutic milieu and referrals as indicated    Counseling/Coordination of Care    Total unit time spent today was greater than 15 minutes. Greater than 50% of total time was spent with the patient and/or patient's relatives and/or coordination of patient's care.     Patient's rights, confidentiality, exceptions to confidentiality, use of electronic medical record including appropriate staff access to medical record regarding behavioral health services and consent to treatment were reviewed.    Note Share:     This note was not shared with the patient due to reasonable likelihood of causing patient harm     This note has been constructed using a voice recognition system.    There may be translation, syntax, or grammatical errors. If you have any questions, please contact the dictating provider.    Jordan Christopher Holter,  01/06/24

## 2024-01-06 NOTE — PLAN OF CARE
Problem: Risk for Self Injury/Neglect  Goal: Treatment Goal: Remain safe during length of stay, learn and adopt new coping skills, and be free of self-injurious ideation, impulses and acts at the time of discharge  Outcome: Progressing  Goal: Verbalize thoughts and feelings  Description: Interventions:  - Assess and re-assess patient's lethality and potential for self-injury  - Engage patient in 1:1 interactions, daily, for a minimum of 15 minutes  - Encourage patient to express feelings, fears, frustrations, hopes  - Establish rapport/trust with patient   Outcome: Progressing  Goal: Refrain from harming self  Description: Interventions:  - Monitor patient closely, per order  - Develop a trusting relationship  - Supervise medication ingestion, monitor effects and side effects   Outcome: Progressing  Goal: Attend and participate in unit activities, including therapeutic, recreational, and educational groups  Description: Interventions:  - Provide therapeutic and educational activities daily, encourage attendance and participation, and document same in the medical record  - Obtain collateral information, encourage visitation and family involvement in care   Outcome: Progressing  Goal: Recognize maladaptive responses and adopt new coping mechanisms  Outcome: Progressing  Goal: Complete daily ADLs, including personal hygiene independently, as able  Description: Interventions:  - Observe, teach, and assist patient with ADLS  - Monitor and promote a balance of rest/activity, with adequate nutrition and elimination  Outcome: Progressing     Problem: Depression  Goal: Treatment Goal: Demonstrate behavioral control of depressive symptoms, verbalize feelings of improved mood/affect, and adopt new coping skills prior to discharge  Outcome: Progressing  Goal: Verbalize thoughts and feelings  Description: Interventions:  - Assess and re-assess patient's level of risk   - Engage patient in 1:1 interactions, daily, for a minimum  of 15 minutes   - Encourage patient to express feelings, fears, frustrations, hopes   Outcome: Progressing  Goal: Refrain from harming self  Description: Interventions:  - Monitor patient closely, per order   - Supervise medication ingestion, monitor effects and side effects   Outcome: Progressing  Goal: Refrain from isolation  Description: Interventions:  - Develop a trusting relationship   - Encourage socialization   Outcome: Progressing  Goal: Refrain from self-neglect  Outcome: Progressing  Goal: Attend and participate in unit activities, including therapeutic, recreational, and educational groups  Description: Interventions:  - Provide therapeutic and educational activities daily, encourage attendance and participation, and document same in the medical record   Outcome: Progressing  Goal: Complete daily ADLs, including personal hygiene independently, as able  Description: Interventions:  - Observe, teach, and assist patient with ADLS  -  Monitor and promote a balance of rest/activity, with adequate nutrition and elimination   Outcome: Progressing     Problem: Anxiety  Goal: Anxiety is at manageable level  Description: Interventions:  - Assess and monitor patient's anxiety level.   - Monitor for signs and symptoms (heart palpitations, chest pain, shortness of breath, headaches, nausea, feeling jumpy, restlessness, irritable, apprehensive).   - Collaborate with interdisciplinary team and initiate plan and interventions as ordered.  - [place] patient to unit/surroundings  - Explain treatment plan  - Encourage participation in care  - Encourage verbalization of concerns/fears  - Identify coping mechanisms  - Assist in developing anxiety-reducing skills  - Administer/offer alternative therapies  - Limit or eliminate stimulants  Outcome: Progressing     Problem: Risk for Violence/Aggression Toward Others  Goal: Treatment Goal: Refrain from acts of violence/aggression during length of stay, and demonstrate improved  impulse control at the time of discharge  Outcome: Progressing  Goal: Verbalize thoughts and feelings  Description: Interventions:  - Assess and re-assess patient's level of risk, every waking shift  - Engage patient in 1:1 interactions, daily, for a minimum of 15 minutes   - Allow patient to express feelings and frustrations in a safe and non-threatening manner   - Establish rapport/trust with patient   Outcome: Progressing  Goal: Refrain from harming others  Outcome: Progressing  Goal: Refrain from destructive acts on the environment or property  Outcome: Progressing  Goal: Control angry outbursts  Description: Interventions:  - Monitor patient closely, per order  - Ensure early verbal de-escalation  - Monitor prn medication needs  - Set reasonable/therapeutic limits, outline behavioral expectations, and consequences   - Provide a non-threatening milieu, utilizing the least restrictive interventions   Outcome: Progressing  Goal: Attend and participate in unit activities, including therapeutic, recreational, and educational groups  Description: Interventions:  - Provide therapeutic and educational activities daily, encourage attendance and participation, and document same in the medical record   Outcome: Progressing  Goal: Identify appropriate positive anger management techniques  Description: Interventions:  - Offer anger management and coping skills groups   - Staff will provide positive feedback for appropriate anger control  Outcome: Progressing     Problem: DISCHARGE PLANNING - CARE MANAGEMENT  Goal: Discharge to post-acute care or home with appropriate resources  Description: INTERVENTIONS:  - Conduct assessment to determine patient/family and health care team treatment goals, and need for post-acute services based on payer coverage, community resources, and patient preferences, and barriers to discharge  - Address psychosocial, clinical, and financial barriers to discharge as identified in assessment in  conjunction with the patient/family and health care team  - Arrange appropriate level of post-acute services according to patient’s   needs and preference and payer coverage in collaboration with the physician and health care team  - Communicate with and update the patient/family, physician, and health care team regarding progress on the discharge plan  - Arrange appropriate transportation to post-acute venues  Outcome: Progressing     Problem: SAFETY, RESTRAINT - VIOLENT/SELF-DESTRUCTIVE  Goal: Remains free of harm/injury from restraints (Restraint for Violent/Self-Destructive Behavior)  Description: INTERVENTIONS:  - Instruct patient/family regarding restraint use   - Assess and monitor physiologic and psychological status   - Provide interventions and comfort measures to meet assessed patient needs   - Ensure continuous in person monitoring is provided   - Identify and implement measures to help patient regain control  - Assess readiness for release of restraint  Outcome: Progressing  Goal: Returns to optimal restraint-free functioning  Description: INTERVENTIONS:  - Assess the patient's behavior and symptoms that indicate continued need for restraint  - Identify and implement measures to help patient regain control  - Assess readiness for release of restraint   Outcome: Progressing

## 2024-01-06 NOTE — NURSING NOTE
"Pt becomes anxious earlier in shift after having a \"confrontation\" during group. Pt describes feeling upset after being singled out by the person running group, and told she should not be out in the community due to being COVID+. Pt has attempted to utilize coping skills for past several hours, stating \" I don't want it to go against me taking PRN's\". Staff reassurance provided. Pt continues to describes increased feelings of anxiety, depression, and isolation while remaining  from peers due to prevention of spread of infection. Significant 1:1 time provided by staff. Pt agreeable to taking PRN medication for anxiety. 50 mg Atarax administered for a minaya of 17. Will monitor for effectiveness and cont to offer therapeutic support as needed.   "

## 2024-01-06 NOTE — NURSING NOTE
"PRN Atarax effective. Pt appears less anxious. Describes feeling \"less upset now\" Currently sitting quietly on thein the dining room watching TV w/ peers. Smiling and laughing at times. Maintaining social distancing, and complaint w/ wearing a mask.   "

## 2024-01-06 NOTE — NURSING NOTE
Pt noted with episodes of crying spells and irritability at dinner time. On approach by this RN, Pt made a preference to speak with a female staff. On an encounter with a female RN, pt reported being depressed and wants to leave. Reported to be depressed and upset about the ongoing discharge process. Pt reassured. CPAP in place at .

## 2024-01-07 LAB
INR PPP: 1.97 (ref 0.84–1.19)
PROTHROMBIN TIME: 22.8 SECONDS (ref 11.6–14.5)

## 2024-01-07 PROCEDURE — 85610 PROTHROMBIN TIME: CPT | Performed by: NURSE PRACTITIONER

## 2024-01-07 PROCEDURE — 99232 SBSQ HOSP IP/OBS MODERATE 35: CPT | Performed by: PSYCHIATRY & NEUROLOGY

## 2024-01-07 RX ORDER — WARFARIN SODIUM 2 MG/1
6 TABLET ORAL
Status: COMPLETED | OUTPATIENT
Start: 2024-01-07 | End: 2024-01-10

## 2024-01-07 RX ADMIN — BUPROPION HYDROCHLORIDE 450 MG: 300 TABLET, EXTENDED RELEASE ORAL at 08:42

## 2024-01-07 RX ADMIN — PALIPERIDONE PALMITATE 234 MG: 234 INJECTION INTRAMUSCULAR at 12:22

## 2024-01-07 RX ADMIN — PANTOPRAZOLE SODIUM 40 MG: 40 TABLET, DELAYED RELEASE ORAL at 05:54

## 2024-01-07 RX ADMIN — TOPIRAMATE 200 MG: 100 TABLET, FILM COATED ORAL at 08:43

## 2024-01-07 RX ADMIN — LOSARTAN POTASSIUM 100 MG: 50 TABLET, FILM COATED ORAL at 08:43

## 2024-01-07 RX ADMIN — MELATONIN TAB 3 MG 3 MG: 3 TAB at 21:01

## 2024-01-07 RX ADMIN — OXYBUTYNIN CHLORIDE 5 MG: 5 TABLET ORAL at 08:43

## 2024-01-07 RX ADMIN — CYANOCOBALAMIN TAB 1000 MCG 1000 MCG: 1000 TAB at 08:42

## 2024-01-07 RX ADMIN — OXCARBAZEPINE 450 MG: 150 TABLET, FILM COATED ORAL at 21:01

## 2024-01-07 RX ADMIN — LEVOTHYROXINE SODIUM 125 MCG: 125 TABLET ORAL at 05:54

## 2024-01-07 RX ADMIN — FUROSEMIDE 20 MG: 20 TABLET ORAL at 08:43

## 2024-01-07 RX ADMIN — Medication 250 MG: at 17:16

## 2024-01-07 RX ADMIN — TOPIRAMATE 200 MG: 100 TABLET, FILM COATED ORAL at 17:16

## 2024-01-07 RX ADMIN — ATOMOXETINE 40 MG: 40 CAPSULE ORAL at 08:43

## 2024-01-07 RX ADMIN — Medication 250 MG: at 08:43

## 2024-01-07 RX ADMIN — WARFARIN SODIUM 6 MG: 2 TABLET ORAL at 17:16

## 2024-01-07 RX ADMIN — OXCARBAZEPINE 450 MG: 150 TABLET, FILM COATED ORAL at 08:42

## 2024-01-07 RX ADMIN — HYDROXYZINE HYDROCHLORIDE 25 MG: 25 TABLET ORAL at 21:01

## 2024-01-07 RX ADMIN — CLONIDINE HYDROCHLORIDE 0.2 MG: 0.1 TABLET ORAL at 21:01

## 2024-01-07 RX ADMIN — NALTREXONE HYDROCHLORIDE 50 MG: 50 TABLET, FILM COATED ORAL at 08:43

## 2024-01-07 RX ADMIN — CLONIDINE HYDROCHLORIDE 0.2 MG: 0.1 TABLET ORAL at 08:42

## 2024-01-07 RX ADMIN — OXYBUTYNIN CHLORIDE 5 MG: 5 TABLET ORAL at 17:16

## 2024-01-07 NOTE — PROGRESS NOTES
Psychiatric Progress Note - Department of Behavioral Health   Blanca Mason 53 y.o. female MRN: [id number]  Unit/Bed#: [facility] 651-02 Encounter: [id number]    ASSESSMENT & PLAN     Diagnoses:   Principal Problem:    Schizoaffective disorder, bipolar type (HCC)  Active Problems:    Benign essential hypertension    Edema    Hypothyroidism    MAG (obstructive sleep apnea)    Overactive bladder    Sjogren's syndrome (HCC)    Medical clearance for psychiatric admission    Anxiety    Borderline personality disorder ([facility])    History of pulmonary embolism    Ingrown toenail      Treatment Recommendations/Precautions:  Continue to promote patient participation in therapeutic milieu.  Continue medical management per medicine.  Continue previously prescribed psychotropic medication regimen; see below.  Continue behavioral health checks q.7 minutes.   Continue vitals per behavioral health unit protocol.  Discharge date per primary team; 201 commitment status.    SUBJECTIVE     Patient evaluated this a.m. for continuity of care. Patient was discussed at length with nursing and treatment team. Per nursing, patient remains calm, cooperative, present in the milieu, adherent to her medications without any acute adverse effects. No acute distress is noted throughout evaluation. Blanca Mason denies suicidal/homicidal ideation in addition to thoughts of self-injury, receptive to crisis planning provided by this writer, contacting for safety in the inpatient setting, admitting to an ability to appropriately confide in staff including this writer. Patient denies any/all psychiatric complaints/concerns including dysphoric mood like depression and anxiety despite admitting to staff previous instances of depression and anxiety pertaining to interpersonal conflict involving peers, although appears somewhat superficial on approach.     PSYCHIATRIC REVIEW OF SYSTEMS     Behavior over the last 24 hours:  unchanged  Sleep:  "adequate  Appetite: adequate  Medication side effects: No    REVIEW OF SYSTEMS   Review of systems: no complaints    OBJECTIVE     Vital Signs in Past 24 Hours:  Temp:  [97.1 °F (36.2 °C)-99.9 °F (37.7 °C)] 97.8 °F (36.6 °C)  HR:  [66-82] 69  Resp:  [17-18] 17  BP: (125-133)/(56-65) 129/60    Intake/Output in Past 24 hours:  I/O last 3 completed shifts:  In: 1080 [P.O.:1080]  Out: -   I/O this shift:  In: 280 [P.O.:280]  Out: -         Laboratory Results:  I have personally reviewed all pertinent laboratory/tests results.  Most Recent Labs:   Lab Results   Component Value Date    WBC 8.96 12/02/2023    RBC 4.34 12/02/2023    HGB 12.9 12/02/2023    HCT 40.5 12/02/2023     12/02/2023    RDW 14.2 12/02/2023    TOTANEUTABS 4.73 11/19/2023    NEUTROABS 5.77 12/02/2023    SODIUM 139 12/02/2023    K 3.8 12/02/2023     12/02/2023    CO2 21 12/02/2023    BUN 13 12/02/2023    CREATININE 0.62 12/02/2023    GLUC 107 12/02/2023    GLUF 107 (H) 12/02/2023    CALCIUM 8.7 12/02/2023    AST 13 12/02/2023    ALT 13 12/02/2023    ALKPHOS 55 12/02/2023    TP 6.0 (L) 12/02/2023    ALB 3.5 12/02/2023    TBILI 0.25 12/02/2023    CHOLESTEROL 234 (H) 03/09/2023    HDL 71 03/09/2023    TRIG 153 (H) 03/09/2023    LDLCALC 132 (H) 03/09/2023    NONHDLC 163 03/09/2023    VALPROICTOT 23 (L) 11/10/2023    AMMONIA 63 11/06/2023    GNV2GOJEAHOT 4.324 12/02/2023    FREET4 0.81 11/06/2023    T3FREE 2.27 (L) 10/16/2019    PREGUR negative 07/13/2022    PREGSERUM Negative 11/06/2023    RPR Non-Reactive 07/17/2022    HGBA1C 5.0 01/05/2023    EAG 97 01/05/2023     Labs in last 72 hours: No results for input(s): \"WBC\", \"RBC\", \"HGB\", \"HCT\", \"PLT\", \"RDW\", \"TOTANEUTABS\", \"NEUTROABS\", \"SODIUM\", \"K\", \"CL\", \"CO2\", \"BUN\", \"CREATININE\", \"GLUC\", \"GLUF\", \"CALCIUM\", \"AST\", \"ALT\", \"ALKPHOS\", \"TP\", \"ALB\", \"TBILI\", \"CHOLESTEROL\", \"HDL\", \"TRIG\", \"LDLCALC\", \"VALPROICTOT\", \"CARBAMAZEPIN\", \"LITHIUM\", \"AMMONIA\", \"[id number]\", \"FREET4\", \"T3FREE\", \"PREGTESTUR\", " "\"PREGSERUM\", \"HCG\", \"HCGQUANT\", \"RPR\" in the last 72 hours.  Admission Labs:   Admission on 12/01/2023   Component Date Value    Sodium 12/02/2023 139     Potassium 12/02/2023 3.8     Chloride 12/02/2023 107     CO2 12/02/2023 21     ANION GAP 12/02/2023 11     BUN 12/02/2023 13     Creatinine 12/02/2023 0.62     Glucose 12/02/2023 107     Glucose, Fasting 12/02/2023 107 (H)     Calcium 12/02/2023 8.7     AST 12/02/2023 13     ALT 12/02/2023 13     Alkaline Phosphatase 12/02/2023 55     Total Protein 12/02/2023 6.0 (L)     Albumin 12/02/2023 3.5     Total Bilirubin 12/02/2023 0.25     eGFR 12/02/2023 104     Magnesium 12/02/2023 2.3     Phosphorus 12/02/2023 3.4     WBC 12/02/2023 8.96     RBC 12/02/2023 4.34     Hemoglobin 12/02/2023 12.9     Hematocrit 12/02/2023 40.5     MCV 12/02/2023 93     MCH 12/02/2023 29.7     MCHC 12/02/2023 31.9     RDW 12/02/2023 14.2     MPV 12/02/2023 10.9     Platelets 12/02/2023 164     nRBC 12/02/2023 0     Neutrophils Relative 12/02/2023 65     Immat GRANS % 12/02/2023 1     Lymphocytes Relative 12/02/2023 25     Monocytes Relative 12/02/2023 8     Eosinophils Relative 12/02/2023 1     Basophils Relative 12/02/2023 0     Neutrophils Absolute 12/02/2023 5.77     Immature Grans Absolute 12/02/2023 0.10     Lymphocytes Absolute 12/02/2023 2.24     Monocytes Absolute 12/02/2023 0.75     Eosinophils Absolute 12/02/2023 0.06     Basophils Absolute 12/02/2023 0.04     TSH 3RD GENERATON 12/02/2023 4.324     Vitamin B-12 12/02/2023 235     Folate 12/02/2023 21.0     Vit D, 25-Hydroxy 12/02/2023 19.3 (L)     Ventricular Rate 12/01/2023 77     Atrial Rate 12/01/2023 77     [place] Interval 12/01/2023 180     QRSD Interval 12/01/2023 90     QT Interval 12/01/2023 388     QTC Interval 12/01/2023 439     P Axis 12/01/2023 47     QRS [place] 12/01/2023 -12     T Wave [place] 12/01/2023 18     Protime 12/03/2023 26.0 (H)     INR 12/03/2023 2.35 (H)     Oxcarbazepine 12/05/2023 8 (L)     QFT Nil 12/05/2023 " <0.00     QFT TB1-NIL 12/05/2023 0.06     QFT TB2-NIL 12/05/2023 0.05     QFT Mitogen-NIL 12/05/2023 10.00     QFT Final Interpretation 12/05/2023 Negative     Ventricular Rate 12/05/2023 72     Atrial Rate 12/05/2023 72     [place] Interval 12/05/2023 194     QRSD Interval 12/05/2023 94     QT Interval 12/05/2023 374     QTC Interval 12/05/2023 409     P Axis 12/05/2023 50     QRS [place] 12/05/2023 -18     T Wave Axis 12/05/2023 8     Protime 12/06/2023 44.4 (H)     INR 12/06/2023 4.70 (H)     Protime 12/08/2023 37.1 (H)     INR 12/08/2023 3.72 (H)     Protime 12/09/2023 22.9 (H)     INR 12/09/2023 1.99 (H)     Oxcarbazepine 12/12/2023 14     Protime 12/12/2023 25.8 (H)     INR 12/12/2023 2.33 (H)     Protime 12/15/2023 36.2 (H)     INR 12/15/2023 3.61 (H)     Protime 12/17/2023 20.0 (H)     INR 12/17/2023 1.67 (H)     Protime 12/18/2023 17.1 (H)     INR 12/18/2023 1.36 (H)     Protime 12/19/2023 19.0 (H)     INR 12/19/2023 1.56 (H)     Protime 12/20/2023 22.9 (H)     INR 12/20/2023 1.99 (H)     Protime 12/22/2023 26.6 (H)     INR 12/22/2023 2.42 (H)     Protime 12/25/2023 35.1 (H)     INR 12/25/2023 3.46 (H)     Protime 12/26/2023 35.2 (H)     INR 12/26/2023 3.48 (H)     Protime 12/27/2023 25.3 (H)     INR 12/27/2023 2.26 (H)     Protime 12/30/2023 22.0 (H)     INR 12/30/2023 1.89 (H)     Protime 01/02/2024 14.3     INR 01/02/2024 1.08     SARS-CoV-2 01/02/2024 Positive (A)     INFLUENZA A PCR 01/02/2024 Negative     INFLUENZA B PCR 01/02/2024 Negative     RSV PCR 01/02/2024 Negative     Protime 01/07/2024 22.8 (H)     INR 01/07/2024 1.97 (H)        Behavioral Health Medications: all current active meds have been reviewed, continue current psychiatric medications, and current meds:   Current Facility-Administered Medications   Medication Dose Route Frequency    aluminum-magnesium hydroxide-simethicone (MAALOX) oral suspension 30 mL  30 mL Oral Q4H PRN    atoMOXetine (STRATTERA) capsule 40 mg  40 mg Oral Daily     buPROPion (WELLBUTRIN XL) 24 hr tablet 450 mg  450 mg Oral Daily    cloNIDine (CATAPRES) tablet 0.2 mg  0.2 mg Oral Q12H TASHA    cyanocobalamin (VITAMIN B-12) tablet 1,000 mcg  1,000 mcg Oral Daily    cyanocobalamin injection 1,000 mcg  1,000 mcg Intramuscular Q30 Days    ergocalciferol (VITAMIN D2) capsule 50,000 Units  50,000 Units Oral Weekly    furosemide (LASIX) tablet 20 mg  20 mg Oral Daily    hydrOXYzine HCL (ATARAX) tablet 25 mg  25 mg Oral Q6H PRN Max 4/day    hydrOXYzine HCL (ATARAX) tablet 50 mg  50 mg Oral Q6H PRN Max 4/day    ibuprofen (MOTRIN) tablet 200 mg  200 mg Oral Q6H PRN    ibuprofen (MOTRIN) tablet 400 mg  400 mg Oral Q6H PRN    ibuprofen (MOTRIN) tablet 600 mg  600 mg Oral Q8H PRN    levothyroxine tablet 125 mcg  125 mcg Oral Early Morning    loperamide (IMODIUM) capsule 2 mg  2 mg Oral Q4H PRN    LORazepam (ATIVAN) injection 1 mg  1 mg Intramuscular Q6H PRN Max 3/day    LORazepam (ATIVAN) tablet 1 mg  1 mg Oral Q6H PRN Max 3/day    losartan (COZAAR) tablet 100 mg  100 mg Oral Daily    melatonin tablet 3 mg  3 mg Oral HS    naltrexone (REVIA) tablet 50 mg  50 mg Oral Daily    OLANZapine (ZyPREXA) tablet 10 mg  10 mg Oral Q6H PRN    Or    OLANZapine (ZyPREXA) IM injection 10 mg  10 mg Intramuscular Q6H PRN    OLANZapine (ZyPREXA) tablet 5 mg  5 mg Oral Q6H PRN    Or    OLANZapine (ZyPREXA) IM injection 5 mg  5 mg Intramuscular Q6H PRN    OLANZapine (ZyPREXA) tablet 2.5 mg  2.5 mg Oral Q3H PRN    OXcarbazepine (TRILEPTAL) tablet 450 mg  450 mg Oral Q12H TASHA    oxybutynin (DITROPAN) tablet 5 mg  5 mg Oral BID    paliperidone palmitate ER (INVEGA) IM- Deltoid injection 234 mg  234 mg IM- Deltoid Q4 weeks    pantoprazole (PROTONIX) EC tablet 40 mg  40 mg Oral Early Morning    polyethylene glycol (MIRALAX) packet 17 g  17 g Oral Daily PRN    saccharomyces boulardii (FLORASTOR) capsule 250 mg  250 mg Oral BID    senna-docusate sodium (SENOKOT S) 8.6-50 mg per tablet 1 tablet  1 tablet Oral Daily PRN     topiramate (TOPAMAX) tablet 200 mg  200 mg Oral BID    traZODone (DESYREL) tablet 50 mg  50 mg Oral HS PRN    warfarin (COUMADIN) tablet 6 mg  6 mg Oral Daily (warfarin)   .    Risks, benefits and possible side effects of Medications:   Risks, benefits, and possible side effects of medications explained to patient and patient verbalizes understanding.      Mental Status Evaluation:  Appearance:  age appropriate, casually dressed, and disheveled   Behavior:  psychomotor retardation, calm and cooperative, slightly superficial   Speech:  normal pitch and normal volume, scant   Mood:  euthymic   Affect:  constricted and mood-incongruent   Language sparse   Thought Process:  goal directed   Thought Content:  No overt obsessions or delusions   Perceptual Disturbances: None   Risk Potential: Suicidal Ideations none, Homicidal Ideations none, and Potential for Aggression No   Sensorium:  person, place, and time/date   Cognition:  recent and remote memory grossly intact   Consciousness:  alert and awake    Attention: attention span appeared shorter than expected for age   Insight:  limited   Judgment: limited   Intellect Not assessed   Gait/Station: normal gait/station and normal balance   Motor Activity: no abnormal movements     Memory: Short and long term memory  fair     Progress Toward Goals: unchanged, as evidenced by their participation (or lack thereof) in individual, social and therapeutic milieu in addition to adherence to their medication regimen    Recommended Treatment:   See above for assessment and plan.    Inpatient Psychiatric Certification: Based upon physical, mental and social evaluations, I certify that inpatient psychiatric services are medically necessary for this patient for a duration of  greater than 2  midnights for the treatment of Schizoaffective disorder, bipolar type (HCC) including psychotropic medication management, participation in therapeutic milieu and referrals as  indicated    Counseling/Coordination of Care    Total unit time spent today was greater than 15 minutes. Greater than 50% of total time was spent with the patient and/or patient's relatives and/or coordination of patient's care.     Patient's rights, confidentiality, exceptions to confidentiality, use of electronic medical record including appropriate staff access to medical record regarding behavioral health services and consent to treatment were reviewed.    Note Share:     This note was not shared with the patient due to reasonable likelihood of causing patient harm     This note has been constructed using a voice recognition system.    There may be translation, syntax, or grammatical errors. If you have any questions, please contact the dictating provider.    Jordan Christopher Holter, DO 01/07/24

## 2024-01-07 NOTE — NURSING NOTE
Pt overall pleasant and cooperative. +meals and meds. Compliant w/ infection prevention precautions. Denies SI/HI. Denies psychosis. Remains free from outbursts or acute behaviors. No need to utilize PRN medications. + [place]. Tolerates well. Becomes tearful; seeking staff attention after becoming upset when interrupted on the phone by a peer. Requests staff 1:1 time. Brief 1:1 time provided. Pt becomes selectively mute w/ this . Staff offers reassurance and encourages Pt to notify staff of and further change in mood in order to receive appropriate PRN medication to prevent acute behaviors. Alternative coping skills strongly encouraged. Pt nods in acceptance, and does spend time coloring and reading quietly in room. Q7's maintained,. Will cont to provide support and redirection as needed.

## 2024-01-07 NOTE — PLAN OF CARE
Problem: Risk for Self Injury/Neglect  Goal: Treatment Goal: Remain safe during length of stay, learn and adopt new coping skills, and be free of self-injurious ideation, impulses and acts at the time of discharge  Outcome: Progressing  Goal: Verbalize thoughts and feelings  Description: Interventions:  - Assess and re-assess patient's lethality and potential for self-injury  - Engage patient in 1:1 interactions, daily, for a minimum of 15 minutes  - Encourage patient to express feelings, fears, frustrations, hopes  - Establish rapport/trust with patient   Outcome: Progressing  Goal: Refrain from harming self  Description: Interventions:  - Monitor patient closely, per order  - Develop a trusting relationship  - Supervise medication ingestion, monitor effects and side effects   Outcome: Progressing  Goal: Attend and participate in unit activities, including therapeutic, recreational, and educational groups  Description: Interventions:  - Provide therapeutic and educational activities daily, encourage attendance and participation, and document same in the medical record  - Obtain collateral information, encourage visitation and family involvement in care   Outcome: Progressing  Goal: Recognize maladaptive responses and adopt new coping mechanisms  Outcome: Progressing  Goal: Complete daily ADLs, including personal hygiene independently, as able  Description: Interventions:  - Observe, teach, and assist patient with ADLS  - Monitor and promote a balance of rest/activity, with adequate nutrition and elimination  Outcome: Progressing     Problem: Depression  Goal: Treatment Goal: Demonstrate behavioral control of depressive symptoms, verbalize feelings of improved mood/affect, and adopt new coping skills prior to discharge  Outcome: Progressing  Goal: Verbalize thoughts and feelings  Description: Interventions:  - Assess and re-assess patient's level of risk   - Engage patient in 1:1 interactions, daily, for a minimum  of 15 minutes   - Encourage patient to express feelings, fears, frustrations, hopes   Outcome: Progressing  Goal: Refrain from harming self  Description: Interventions:  - Monitor patient closely, per order   - Supervise medication ingestion, monitor effects and side effects   Outcome: Progressing  Goal: Refrain from isolation  Description: Interventions:  - Develop a trusting relationship   - Encourage socialization   Outcome: Progressing  Goal: Refrain from self-neglect  Outcome: Progressing  Goal: Attend and participate in unit activities, including therapeutic, recreational, and educational groups  Description: Interventions:  - Provide therapeutic and educational activities daily, encourage attendance and participation, and document same in the medical record   Outcome: Progressing  Goal: Complete daily ADLs, including personal hygiene independently, as able  Description: Interventions:  - Observe, teach, and assist patient with ADLS  -  Monitor and promote a balance of rest/activity, with adequate nutrition and elimination   Outcome: Progressing     Problem: Anxiety  Goal: Anxiety is at manageable level  Description: Interventions:  - Assess and monitor patient's anxiety level.   - Monitor for signs and symptoms (heart palpitations, chest pain, shortness of breath, headaches, nausea, feeling jumpy, restlessness, irritable, apprehensive).   - Collaborate with interdisciplinary team and initiate plan and interventions as ordered.  - [place] patient to unit/surroundings  - Explain treatment plan  - Encourage participation in care  - Encourage verbalization of concerns/fears  - Identify coping mechanisms  - Assist in developing anxiety-reducing skills  - Administer/offer alternative therapies  - Limit or eliminate stimulants  Outcome: Progressing     Problem: Risk for Violence/Aggression Toward Others  Goal: Treatment Goal: Refrain from acts of violence/aggression during length of stay, and demonstrate improved  impulse control at the time of discharge  Outcome: Progressing  Goal: Verbalize thoughts and feelings  Description: Interventions:  - Assess and re-assess patient's level of risk, every waking shift  - Engage patient in 1:1 interactions, daily, for a minimum of 15 minutes   - Allow patient to express feelings and frustrations in a safe and non-threatening manner   - Establish rapport/trust with patient   Outcome: Progressing  Goal: Refrain from harming others  Outcome: Progressing  Goal: Refrain from destructive acts on the environment or property  Outcome: Progressing  Goal: Control angry outbursts  Description: Interventions:  - Monitor patient closely, per order  - Ensure early verbal de-escalation  - Monitor prn medication needs  - Set reasonable/therapeutic limits, outline behavioral expectations, and consequences   - Provide a non-threatening milieu, utilizing the least restrictive interventions   Outcome: Progressing  Goal: Attend and participate in unit activities, including therapeutic, recreational, and educational groups  Description: Interventions:  - Provide therapeutic and educational activities daily, encourage attendance and participation, and document same in the medical record   Outcome: Progressing  Goal: Identify appropriate positive anger management techniques  Description: Interventions:  - Offer anger management and coping skills groups   - Staff will provide positive feedback for appropriate anger control  Outcome: Progressing     Problem: SAFETY, RESTRAINT - VIOLENT/SELF-DESTRUCTIVE  Goal: Remains free of harm/injury from restraints (Restraint for Violent/Self-Destructive Behavior)  Description: INTERVENTIONS:  - Instruct patient/family regarding restraint use   - Assess and monitor physiologic and psychological status   - Provide interventions and comfort measures to meet assessed patient needs   - Ensure continuous in person monitoring is provided   - Identify and implement measures to  help patient regain control  - Assess readiness for release of restraint  Outcome: Progressing  Goal: Returns to optimal restraint-free functioning  Description: INTERVENTIONS:  - Assess the patient's behavior and symptoms that indicate continued need for restraint  - Identify and implement measures to help patient regain control  - Assess readiness for release of restraint   Outcome: Progressing

## 2024-01-08 PROCEDURE — 99232 SBSQ HOSP IP/OBS MODERATE 35: CPT | Performed by: STUDENT IN AN ORGANIZED HEALTH CARE EDUCATION/TRAINING PROGRAM

## 2024-01-08 RX ORDER — BENZOCAINE/MENTHOL 6 MG-10 MG
LOZENGE MUCOUS MEMBRANE 4 TIMES DAILY PRN
Status: DISCONTINUED | OUTPATIENT
Start: 2024-01-08 | End: 2024-02-01 | Stop reason: HOSPADM

## 2024-01-08 RX ADMIN — CLONIDINE HYDROCHLORIDE 0.2 MG: 0.1 TABLET ORAL at 08:49

## 2024-01-08 RX ADMIN — Medication 250 MG: at 08:51

## 2024-01-08 RX ADMIN — CYANOCOBALAMIN TAB 1000 MCG 1000 MCG: 1000 TAB at 08:50

## 2024-01-08 RX ADMIN — OXYBUTYNIN CHLORIDE 5 MG: 5 TABLET ORAL at 08:50

## 2024-01-08 RX ADMIN — MELATONIN TAB 3 MG 3 MG: 3 TAB at 21:26

## 2024-01-08 RX ADMIN — OXYBUTYNIN CHLORIDE 5 MG: 5 TABLET ORAL at 17:23

## 2024-01-08 RX ADMIN — WARFARIN SODIUM 6 MG: 2 TABLET ORAL at 17:24

## 2024-01-08 RX ADMIN — FUROSEMIDE 20 MG: 20 TABLET ORAL at 08:51

## 2024-01-08 RX ADMIN — CLONIDINE HYDROCHLORIDE 0.2 MG: 0.1 TABLET ORAL at 21:26

## 2024-01-08 RX ADMIN — PANTOPRAZOLE SODIUM 40 MG: 40 TABLET, DELAYED RELEASE ORAL at 05:50

## 2024-01-08 RX ADMIN — TOPIRAMATE 200 MG: 100 TABLET, FILM COATED ORAL at 17:23

## 2024-01-08 RX ADMIN — LEVOTHYROXINE SODIUM 125 MCG: 125 TABLET ORAL at 05:50

## 2024-01-08 RX ADMIN — ERGOCALCIFEROL 50000 UNITS: 1.25 CAPSULE ORAL at 08:50

## 2024-01-08 RX ADMIN — OLANZAPINE 10 MG: 10 TABLET, FILM COATED ORAL at 10:03

## 2024-01-08 RX ADMIN — Medication 250 MG: at 17:23

## 2024-01-08 RX ADMIN — NALTREXONE HYDROCHLORIDE 50 MG: 50 TABLET, FILM COATED ORAL at 08:50

## 2024-01-08 RX ADMIN — BUPROPION HYDROCHLORIDE 450 MG: 300 TABLET, EXTENDED RELEASE ORAL at 08:50

## 2024-01-08 RX ADMIN — HYDROCORTISONE: 1 CREAM TOPICAL at 12:57

## 2024-01-08 RX ADMIN — OXCARBAZEPINE 450 MG: 150 TABLET, FILM COATED ORAL at 08:50

## 2024-01-08 RX ADMIN — ATOMOXETINE 40 MG: 40 CAPSULE ORAL at 08:50

## 2024-01-08 RX ADMIN — OXCARBAZEPINE 450 MG: 150 TABLET, FILM COATED ORAL at 21:25

## 2024-01-08 RX ADMIN — TOPIRAMATE 200 MG: 100 TABLET, FILM COATED ORAL at 08:50

## 2024-01-08 NOTE — NURSING NOTE
PRN Zyprexa appears to have been effective. Pt continues to be minimal or mute w/ this , but is currently calm, quiet, and and utilizing coping skills. Will cont to monitor

## 2024-01-08 NOTE — TREATMENT TEAM
Pt did not attend am group.  When questioned pt indicated she was with doctor.  When reminded she may attend group after speaking with doctor pt indicated she did not know the time of group.  Reminded pt that group time is same every day and also written in group room on white board which is updated daily.  Pt requested that we review anger management sheets.  Provided firm boundaries and reminded her that she needs to attend groups.  Individual time will not be provided without effort needs accountability to attend groups. Advised Nursing and doctor of pt comments. Encouraged pt to attend afternoon group.  Attention seeking , making excuses and looking of individual attention.   01/08/24 1000   Activity/Group Checklist   Group Community meeting   Attendance Refused

## 2024-01-08 NOTE — PROGRESS NOTES
Progress Note - Behavioral Health   Blanca Mason 53 y.o. female MRN: [id number]  Unit/Bed#: [facility] 651-02 Encounter: [id number]    Patient was seen today for continuation of care, records reviewed and patient was discussed with the morning case review team.    Blanca was seen today for psychiatric follow-up.  On assessment today, Blanca was seen at bedside coloring. Per staff, prior to interview, patient was requesting one on one time and was frustrated due to COVID isolation. She became frustrated & loud eventually requiring PO Zyprexa for agitation. On approach she is upset surrounding the same. Feels that she is being treated unfairly, often making reference to another male peer. Attempted re-education of unit protocol. Feels that she is being targeted and persecuted due to her medical diagnosis and was not very redirectable despite attempts. She did accept her Invega SNOWDEN on 1/7/24 which she seems to be tolerating well. Currently being recommended for [facility] for ongoing structure and treatment.      Blanca denies acute suicidal/self-harm ideation/intent/plan upon direct inquiry at this time.  Blanca continues to display a pattern of maladaptive behaviors but no worsening aggression.  Blanca also denies HI/AH/VH, and does not appear overtly manic.  Does make reference to some presecretory thoughts.  Impulse control is limited.  Blanca remains adherent to her current psychotropic medication regimen and denies any side effects from medications, as well as none noted on exam.    Vitals:  Vitals:    01/08/24 0745   BP: 110/59   Pulse: 76   Resp: 17   Temp: 98.6 °F (37 °C)   SpO2: 99%       Laboratory Results:  I have personally reviewed all pertinent laboratory/tests results.  Most Recent Labs:   Lab Results   Component Value Date    WBC 8.96 12/02/2023    RBC 4.34 12/02/2023    HGB 12.9 12/02/2023    HCT 40.5 12/02/2023     12/02/2023    RDW 14.2 12/02/2023    TOTANEUTABS 4.73 11/19/2023    NEUTROABS 5.77 12/02/2023     SODIUM 139 12/02/2023    K 3.8 12/02/2023     12/02/2023    CO2 21 12/02/2023    BUN 13 12/02/2023    CREATININE 0.62 12/02/2023    GLUC 107 12/02/2023    GLUF 107 (H) 12/02/2023    CALCIUM 8.7 12/02/2023    AST 13 12/02/2023    ALT 13 12/02/2023    ALKPHOS 55 12/02/2023    TP 6.0 (L) 12/02/2023    ALB 3.5 12/02/2023    TBILI 0.25 12/02/2023    CHOLESTEROL 234 (H) 03/09/2023    HDL 71 03/09/2023    TRIG 153 (H) 03/09/2023    LDLCALC 132 (H) 03/09/2023    NONHDLC 163 03/09/2023    VALPROICTOT 23 (L) 11/10/2023    AMMONIA 63 11/06/2023    OFO1LRCPODNJ 4.324 12/02/2023    FREET4 0.81 11/06/2023    T3FREE 2.27 (L) 10/16/2019    PREGUR negative 07/13/2022    PREGSERUM Negative 11/06/2023    RPR Non-Reactive 07/17/2022    HGBA1C 5.0 01/05/2023    EAG 97 01/05/2023       Psychiatric Review of Systems:  Behavior over the last 24 hours:  unchanged.   Sleep: fair  Appetite: fair  Medication side effects: Denies  ROS: no complaints, denies shortness of breath or chest pain and all other systems are negative for acute changes    Mental Status Evaluation:  Appearance:  age appropriate, casually dressed, dressed appropriately, marginal hygiene, facial dermatitis from mask   Behavior:  evasive   Speech:  scant   Mood:  depressed, anxious, irritable   Affect:  labile   Thought Process:  perseverative, concrete   Thought Content:  some paranoia   Perceptual Disturbances: no auditory hallucinations, no visual hallucinations   Risk Potential: Suicidal ideation - None at present  Homicidal ideation - None at present  Potential for aggression - No   Memory:  recent and remote memory grossly intact   Sensorium  person, place, and time/date      Consciousness:  alert and awake   Attention: attention span and concentration are age appropriate   Insight:  limited   Judgment: limited   Gait/Station: normal gait/station   Motor Activity: no abnormal movements   Progress Toward Goals:   Blanca is progressing towards goals of inpatient  psychiatric treatment by continued medication compliance and is attending therapeutic modalities on the milieu. However, the patient continues to require inpatient psychiatric hospitalization for continued medication management and titration to optimize symptom reduction, improve sleep hygiene, and demonstrate adequate self-care.    Assessment/Plan   Principal Problem:    Schizoaffective disorder, bipolar type (HCC)  Active Problems:    Benign essential hypertension    Edema    Hypothyroidism    MAG (obstructive sleep apnea)    Overactive bladder    Sjogren's syndrome (HCC)    Medical clearance for psychiatric admission    Anxiety    Borderline personality disorder ([facility])    History of pulmonary embolism    Ingrown toenail      Recommended Treatment: Treatment plan and medication changes discussed and per the attending physician the plan is:    1.Continue with group therapy, milieu therapy and occupational therapy  2.Behavioral Health checks every 7 minutes  3.Continue frequent safety checks and vitals per unit protocol  4.Continue with SLIM medical management as indicated  5.Continue with current medication regimen:  Atomoxetine 40 mg QAM for ADHD  Wellbutrin  mg QAM for depression  Clonidine 0.2 mg BID for ADHD & impulsivity  Naltrexone 50 mg QAM for impulsivity  Trileptal 450 mg BID for mood stabilization  Invega 234 every month for psychosis and mood  Topamax 200 mg BID for mood stabilization  6.Will review labs in the a.m.  7.Disposition Planning: Discharge planning and efforts remain ongoing; awaiting EAC referral & [facility] meeting this week    Behavioral Health Medications: all current active meds have been reviewed.  Current Facility-Administered Medications   Medication Dose Route Frequency Provider Last Rate    aluminum-magnesium hydroxide-simethicone  30 mL Oral Q4H PRN JHONATAN Fields      atoMOXetine  40 mg Oral Daily Anatoly Prajapati DO      buPROPion  450 mg Oral Daily Anatoly Rodriguez  Alo, DO      cloNIDine  0.2 mg Oral Q12H [facility] Virginia Mccullough, CRNP      cyanocobalamin  1,000 mcg Oral Daily Dianna Mcrae, CRNP      cyanocobalamin  1,000 mcg Intramuscular Q30 Days Dianna Mcrae, CRNP      ergocalciferol  50,000 Units Oral Weekly Dianna Mcrae, CRNP      furosemide  20 mg Oral Daily Wendy Anderson, CRNP      hydrocortisone   Topical 4x Daily PRN Dianna Mcrae, CRNP      hydrOXYzine HCL  25 mg Oral Q6H PRN Max 4/day Virginia Mccullough, CRNP      hydrOXYzine HCL  50 mg Oral Q6H PRN Max 4/day Virginia Mccullough, CRNP      ibuprofen  200 mg Oral Q6H PRN Virginia Mccullough, CRNP      ibuprofen  400 mg Oral Q6H PRN Virginia Mccullough, CRNP      ibuprofen  600 mg Oral Q8H PRN Virginia Mccullough, CRNP      levothyroxine  125 mcg Oral Early Morning Wendy Anderson, CRNP      loperamide  2 mg Oral Q4H PRN Anatoly Prajapati, DO      LORazepam  1 mg Intramuscular Q6H PRN Max 3/day Virginia Mccullough, CRNP      LORazepam  1 mg Oral Q6H PRN Max 3/day Virginia Mccullough, CRNP      losartan  100 mg Oral Daily Wendy Anderson, CRNP      melatonin  3 mg Oral HS Frederick Smith MD      naltrexone  50 mg Oral Daily Anatoly Prajapati, DO      OLANZapine  10 mg Oral Q6H PRN Frederick Smith MD      Or    OLANZapine  10 mg Intramuscular Q6H PRN Frederick Smith MD      OLANZapine  5 mg Oral Q6H PRN Frederick Smith MD      Or    OLANZapine  5 mg Intramuscular Q6H PRN Frederick Smith MD      OLANZapine  2.5 mg Oral Q3H PRN Frederick Smith MD      OXcarbazepine  450 mg Oral Q12H [facility] Frederick Smith MD      oxybutynin  5 mg Oral BID Wendy Anderson, CRNP      paliperidone  234 mg IM- Deltoid Q4 weeks Virginia Mccullough, CRNP      pantoprazole  40 mg Oral Early Morning Wendy Anderson, LAURANP      polyethylene glycol  17 g Oral Daily PRN Virginia Mccullough, CRNP      saccharomyces boulardii  250 mg Oral BID DO troy Martel-docusate sodium  1 tablet Oral Daily PRN Virginia Mccullough,  CRNP      topiramate  200 mg Oral BID Anatoly Prajapati DO      traZODone  50 mg Oral HS PRN JHONATAN Anne      warfarin  6 mg Oral Daily (warfarin) JHONATAN Berg         Risks / Benefits of Treatment:  Risks, benefits, and possible side effects of medications explained to patient and patient verbalizes understanding and agreement for treatment.    Counseling / Coordination of Care:  Patient's progress reviewed with nursing staff.  Medications, treatment progress and treatment plan reviewed with patient.  Supportive counseling provided to the patient.    Total floor/unit time spent today 25 minutes. Greater than 50% of total time was spent with the patient and / or family counseling and / or coordination of care. A description of the counseling / coordination of care: medication education, treatment plan, supportive therapy.

## 2024-01-08 NOTE — PLAN OF CARE
Problem: Risk for Self Injury/Neglect  Goal: Treatment Goal: Remain safe during length of stay, learn and adopt new coping skills, and be free of self-injurious ideation, impulses and acts at the time of discharge  Outcome: Progressing  Goal: Verbalize thoughts and feelings  Description: Interventions:  - Assess and re-assess patient's lethality and potential for self-injury  - Engage patient in 1:1 interactions, daily, for a minimum of 15 minutes  - Encourage patient to express feelings, fears, frustrations, hopes  - Establish rapport/trust with patient   Outcome: Progressing  Goal: Refrain from harming self  Description: Interventions:  - Monitor patient closely, per order  - Develop a trusting relationship  - Supervise medication ingestion, monitor effects and side effects   Outcome: Progressing  Goal: Recognize maladaptive responses and adopt new coping mechanisms  Outcome: Not Progressing     Problem: Depression  Goal: Treatment Goal: Demonstrate behavioral control of depressive symptoms, verbalize feelings of improved mood/affect, and adopt new coping skills prior to discharge  Outcome: Progressing  Goal: Verbalize thoughts and feelings  Description: Interventions:  - Assess and re-assess patient's level of risk   - Engage patient in 1:1 interactions, daily, for a minimum of 15 minutes   - Encourage patient to express feelings, fears, frustrations, hopes   Outcome: Progressing  Goal: Refrain from harming self  Description: Interventions:  - Monitor patient closely, per order   - Supervise medication ingestion, monitor effects and side effects   Outcome: Progressing  Goal: Refrain from isolation  Description: Interventions:  - Develop a trusting relationship   - Encourage socialization   Outcome: Not Progressing  Goal: Refrain from self-neglect  Outcome: Progressing  Goal: Complete daily ADLs, including personal hygiene independently, as able  Description: Interventions:  - Observe, teach, and assist patient  with ADLS  -  Monitor and promote a balance of rest/activity, with adequate nutrition and elimination   Outcome: Progressing     Problem: Anxiety  Goal: Anxiety is at manageable level  Description: Interventions:  - Assess and monitor patient's anxiety level.   - Monitor for signs and symptoms (heart palpitations, chest pain, shortness of breath, headaches, nausea, feeling jumpy, restlessness, irritable, apprehensive).   - Collaborate with interdisciplinary team and initiate plan and interventions as ordered.  - [place] patient to unit/surroundings  - Explain treatment plan  - Encourage participation in care  - Encourage verbalization of concerns/fears  - Identify coping mechanisms  - Assist in developing anxiety-reducing skills  - Administer/offer alternative therapies  - Limit or eliminate stimulants  Outcome: Not Progressing     Problem: Risk for Violence/Aggression Toward Others  Goal: Treatment Goal: Refrain from acts of violence/aggression during length of stay, and demonstrate improved impulse control at the time of discharge  Outcome: Progressing  Goal: Refrain from destructive acts on the environment or property  Outcome: Progressing  Goal: Control angry outbursts  Description: Interventions:  - Monitor patient closely, per order  - Ensure early verbal de-escalation  - Monitor prn medication needs  - Set reasonable/therapeutic limits, outline behavioral expectations, and consequences   - Provide a non-threatening milieu, utilizing the least restrictive interventions   Outcome: Progressing

## 2024-01-08 NOTE — PROGRESS NOTES
01/08/24 0816   Team Meeting   Meeting Type Daily Rounds   Initial Conference Date 01/08/24   Next Conference Date 01/09/24   Team Members Present   Team Members Present Physician;Nurse;;;Occupational Therapist   Physician Team Member SHEFALI Rodriguez, Dr Quispe   Nursing Team Member Aram   Care Management Team Member Atiya   Social Work Team Member Dalia TALBOT Team Member Yeison   Patient/Family Present   Patient Present No   Patient's Family Present No     CPC meeting on Friday regarding EAC placement, discharge pending.

## 2024-01-08 NOTE — NURSING NOTE
"Pt approaches nurse requesting 1:1 time. Staff informs Pt only brief 1:1 time can be provided at this time to address concerns. Pt expresses frustration and feelings of isolation due to COVID DX. Pt describes feelings of persecution, and has been ruminating on snack time last night. Pt states, \"I am sick of being in my room alone. That other patient gets to eat out there.. Everyone treats me differently! It's not fair!\" Pt is reminded of unit protocols, and offered reassurance that they apply to all patients. Pt escalates in mood and tone; getting louder and repetitive in content. Staff sets firm boundaries; encourages utilization of coping skills and focus on self and own TX in place of others. Staff offers PRN medication for agitation. Pt becomes selectively mute w/ poor eye contact. Zyprexa 10 mg offered and accepted w/ encouragement. Will monitor for effectiveness and cont to provide support and redirection as needed.   "

## 2024-01-08 NOTE — NURSING NOTE
"Patient is tearful and accusatory towards stating that she feels everyone is treating her differently because of her Covid positive diagnosis. Patient was reassured and educated on the COVID protocols. Patient verbalized she understood. Patient then became increasingly upset stating a staff member during groups on Saturday 1/6/24 embarrassed her and stated \"I need medication for my anxiety\". HAM score is 8. Prn atarax 25 mg was administered at 2101.  Upon reassessment prn atarax was effective. Patient thanked this writer for speaking with her. Patient is encouraged to make any other needs known. Safety checks ongoing.  "

## 2024-01-08 NOTE — PROGRESS NOTES
"Pt attended afternoon group.  Pt more engaged and focused on [facility] recovery needs. Pt requested to review anger management worksheets and noted she will try to go to staff for support rather than peers to deal with her concerns/anger. Encouraged to to review her actions/behaviors/impulsive actions and ask what she would do differently /change.  Also \"looking at the bigger picture\" when she gets angry/blames.   01/08/24 1330   Activity/Group Checklist   Group Other (Comment)  (Community support and resources)   Attendance Attended   Attendance Duration (min) 31-45   Interactions Interacted appropriately   Affect/Mood Appropriate   Goals Achieved Identified feelings;Identified relapse prevention strategies;Able to engage in interactions;Able to listen to others;Increased hopefulness;Discussed coping strategies;Able to self-disclose;Verbalized increased hopefulness;Able to reflect/comment on own behavior;Able to manage/cope with feelings               "

## 2024-01-08 NOTE — CASE MANAGEMENT
CPC meeting will be Friday, 1/12/24 at 10 am with the county, [place], [place] [facility], [place], and the patient.

## 2024-01-09 PROCEDURE — 99232 SBSQ HOSP IP/OBS MODERATE 35: CPT | Performed by: STUDENT IN AN ORGANIZED HEALTH CARE EDUCATION/TRAINING PROGRAM

## 2024-01-09 RX ADMIN — TOPIRAMATE 200 MG: 100 TABLET, FILM COATED ORAL at 08:08

## 2024-01-09 RX ADMIN — CLONIDINE HYDROCHLORIDE 0.2 MG: 0.1 TABLET ORAL at 21:35

## 2024-01-09 RX ADMIN — OXYBUTYNIN CHLORIDE 5 MG: 5 TABLET ORAL at 17:12

## 2024-01-09 RX ADMIN — TOPIRAMATE 200 MG: 100 TABLET, FILM COATED ORAL at 17:11

## 2024-01-09 RX ADMIN — NALTREXONE HYDROCHLORIDE 50 MG: 50 TABLET, FILM COATED ORAL at 08:09

## 2024-01-09 RX ADMIN — Medication 250 MG: at 08:09

## 2024-01-09 RX ADMIN — PANTOPRAZOLE SODIUM 40 MG: 40 TABLET, DELAYED RELEASE ORAL at 05:57

## 2024-01-09 RX ADMIN — ATOMOXETINE 40 MG: 40 CAPSULE ORAL at 08:07

## 2024-01-09 RX ADMIN — OXYBUTYNIN CHLORIDE 5 MG: 5 TABLET ORAL at 08:14

## 2024-01-09 RX ADMIN — CYANOCOBALAMIN TAB 1000 MCG 1000 MCG: 1000 TAB at 08:07

## 2024-01-09 RX ADMIN — OXCARBAZEPINE 450 MG: 150 TABLET, FILM COATED ORAL at 21:35

## 2024-01-09 RX ADMIN — LEVOTHYROXINE SODIUM 125 MCG: 125 TABLET ORAL at 05:57

## 2024-01-09 RX ADMIN — LOSARTAN POTASSIUM 100 MG: 50 TABLET, FILM COATED ORAL at 08:08

## 2024-01-09 RX ADMIN — WARFARIN SODIUM 6 MG: 2 TABLET ORAL at 17:12

## 2024-01-09 RX ADMIN — OXCARBAZEPINE 450 MG: 150 TABLET, FILM COATED ORAL at 08:08

## 2024-01-09 RX ADMIN — Medication 250 MG: at 17:12

## 2024-01-09 RX ADMIN — FUROSEMIDE 20 MG: 20 TABLET ORAL at 08:08

## 2024-01-09 RX ADMIN — MELATONIN TAB 3 MG 3 MG: 3 TAB at 21:35

## 2024-01-09 RX ADMIN — CLONIDINE HYDROCHLORIDE 0.2 MG: 0.1 TABLET ORAL at 08:08

## 2024-01-09 RX ADMIN — BUPROPION HYDROCHLORIDE 450 MG: 300 TABLET, EXTENDED RELEASE ORAL at 08:07

## 2024-01-09 NOTE — NURSING NOTE
Pt is calm and cooperation with care this shift . Pt visible on the unit at times other wise isolative to self and room. Pt is medications and meals compliant . Pt denies having anxiety, depression ,SI/HI/AVH at this this time . Pt offers no concerns at this time.

## 2024-01-09 NOTE — NURSING NOTE
Patient is withdrawn to room thorough out shift. Denies anxiety, depression, SI/HI/AH/VH. Cooperative with care. Medication compliant. Patient encouraged to make needs known. Safety checks ongoing..

## 2024-01-09 NOTE — PROGRESS NOTES
"Progress Note - Behavioral Health   Blanca Mason 53 y.o. female MRN: [id number]  Unit/Bed#: [facility] 651-02 Encounter: [id number]    Patient was seen today for continuation of care, records reviewed and patient was discussed with the morning case review team.    Blanca was seen today for psychiatric follow-up.  On assessment today, Blanca was seen at bedside coloring. Per staff, continues to exhibit maladaptive and splitting behaviors. She is slightly more cooperative today but with poor eye contact. Visibly appears depressed and labile. Frustrated about not, \"not going home.\" Reminded of [facility] meeting this Friday. Does require some redirection from being preoccupied with \"unfairness\" from specific staff and peers. Adhering to COVID-19 protocol. Re-discussed treatment and her group attendance as well.      Blanca denies acute suicidal/self-harm ideation/intent/plan upon direct inquiry at this time.  Blanca continues to display a pattern of maladaptive behaviors but no worsening aggression.  Blanca also denies HI/AH/VH, and does not appear overtly manic. Still paranoid with some staff and peers. Impulse control is limited.  Blanca remains adherent to her current psychotropic medication regimen and denies any side effects from medications, as well as none noted on exam.    Vitals:  Vitals:    01/09/24 0759   BP: 136/79   Pulse: 75   Resp: 18   Temp: (!) 97 °F (36.1 °C)   SpO2: 98%       Laboratory Results:  I have personally reviewed all pertinent laboratory/tests results.  Most Recent Labs:   Lab Results   Component Value Date    WBC 8.96 12/02/2023    RBC 4.34 12/02/2023    HGB 12.9 12/02/2023    HCT 40.5 12/02/2023     12/02/2023    RDW 14.2 12/02/2023    TOTANEUTABS 4.73 11/19/2023    NEUTROABS 5.77 12/02/2023    SODIUM 139 12/02/2023    K 3.8 12/02/2023     12/02/2023    CO2 21 12/02/2023    BUN 13 12/02/2023    CREATININE 0.62 12/02/2023    GLUC 107 12/02/2023    GLUF 107 (H) 12/02/2023    CALCIUM 8.7 12/02/2023 "    AST 13 12/02/2023    ALT 13 12/02/2023    ALKPHOS 55 12/02/2023    TP 6.0 (L) 12/02/2023    ALB 3.5 12/02/2023    TBILI 0.25 12/02/2023    CHOLESTEROL 234 (H) 03/09/2023    HDL 71 03/09/2023    TRIG 153 (H) 03/09/2023    LDLCALC 132 (H) 03/09/2023    NONHDLC 163 03/09/2023    VALPROICTOT 23 (L) 11/10/2023    AMMONIA 63 11/06/2023    [id number] 4.324 12/02/2023    FREET4 0.81 11/06/2023    T3FREE 2.27 (L) 10/16/2019    PREGUR negative 07/13/2022    PREGSERUM Negative 11/06/2023    RPR Non-Reactive 07/17/2022    HGBA1C 5.0 01/05/2023    EAG 97 01/05/2023       Psychiatric Review of Systems:  Behavior over the last 24 hours:  unchanged.   Sleep: fair  Appetite: fair  Medication side effects: Denies  ROS: no complaints, denies shortness of breath or chest pain and all other systems are negative for acute changes    Mental Status Evaluation:  Appearance:  age appropriate, casually dressed, dressed appropriately, marginal hygiene, improved facial dermatitis   Behavior:  guarded, slightly more cooperative & redirectable   Speech:  scant   Mood:  depressed, anxious   Affect:  labile   Thought Process:  perseverative, concrete   Thought Content:  some paranoia   Perceptual Disturbances: no auditory hallucinations, no visual hallucinations   Risk Potential: Suicidal ideation - None at present  Homicidal ideation - None at present  Potential for aggression - No   Memory:  recent and remote memory grossly intact   Sensorium  person, place, and time/date      Consciousness:  alert and awake   Attention: attention span and concentration are age appropriate   Insight:  limited   Judgment: limited   Gait/Station: normal gait/station   Motor Activity: no abnormal movements   Progress Toward Goals:   Blanca is progressing towards goals of inpatient psychiatric treatment by continued medication compliance and is attending therapeutic modalities on the milieu. However, the patient continues to require inpatient psychiatric  hospitalization for continued medication management and titration to optimize symptom reduction, improve sleep hygiene, and demonstrate adequate self-care.    Assessment/Plan   Principal Problem:    Schizoaffective disorder, bipolar type (HCC)  Active Problems:    Benign essential hypertension    Edema    Hypothyroidism    MAG (obstructive sleep apnea)    Overactive bladder    Sjogren's syndrome (HCC)    Medical clearance for psychiatric admission    Anxiety    Borderline personality disorder (HCC)    History of pulmonary embolism    Ingrown toenail      Recommended Treatment: Treatment plan and medication changes discussed and per the attending physician the plan is:    1.Continue with group therapy, milieu therapy and occupational therapy  2.Behavioral Health checks every 7 minutes  3.Continue frequent safety checks and vitals per unit protocol  4.Continue with SLIM medical management as indicated  5.Continue with current medication regimen:  Atomoxetine 40 mg QAM for ADHD  Wellbutrin  mg QAM for depression  Clonidine 0.2 mg BID for ADHD & impulsivity  Naltrexone 50 mg QAM for impulsivity  Trileptal 450 mg BID for mood stabilization  Invega 234 every month for psychosis and mood  Topamax 200 mg BID for mood stabilization  6.Will review labs in the a.m.  7.Disposition Planning: Discharge planning and efforts remain ongoing; awaiting EAC referral & [facility] meeting this week    Behavioral Health Medications: all current active meds have been reviewed.  Current Facility-Administered Medications   Medication Dose Route Frequency Provider Last Rate    aluminum-magnesium hydroxide-simethicone  30 mL Oral Q4H PRN JHONATAN Fields      atoMOXetine  40 mg Oral Daily Anatoly Prajapati,       buPROPion  450 mg Oral Daily Anatoly Prajapati, DO      cloNIDine  0.2 mg Oral Q12H [facility] JHONATAN Anne      cyanocobalamin  1,000 mcg Oral Daily JHONATAN Berg      cyanocobalamin  1,000 mcg  Intramuscular Q30 Days Dianna Mcrae, CRNP      ergocalciferol  50,000 Units Oral Weekly Dianna Mcrae, CRNP      furosemide  20 mg Oral Daily Wendy Anderson, CRNP      hydrocortisone   Topical 4x Daily PRN Dianna Mcrae, CRNP      hydrOXYzine HCL  25 mg Oral Q6H PRN Max 4/day Virginia Mccullough, CRNP      hydrOXYzine HCL  50 mg Oral Q6H PRN Max 4/day Virginia Mccullough, CRNP      ibuprofen  200 mg Oral Q6H PRN Virginia Mccullough, CRNP      ibuprofen  400 mg Oral Q6H PRN Virginia Mccullough, CRNP      ibuprofen  600 mg Oral Q8H PRN Virginia Mccullough, CRNP      levothyroxine  125 mcg Oral Early Morning Wendy Anderson, CRNP      loperamide  2 mg Oral Q4H PRN Anatoly Prajapati, DO      LORazepam  1 mg Intramuscular Q6H PRN Max 3/day Virginia Mccullough, CRNP      LORazepam  1 mg Oral Q6H PRN Max 3/day Virginia Mccullough, CRNP      losartan  100 mg Oral Daily Wendy Anderson, CRNP      melatonin  3 mg Oral HS Frederick Smith MD      naltrexone  50 mg Oral Daily Anatoly Prajapati, DO      OLANZapine  10 mg Oral Q6H PRN Frederick Smith MD      Or    OLANZapine  10 mg Intramuscular Q6H PRN Frederick Smith MD      OLANZapine  5 mg Oral Q6H PRN Frederick Smith MD      Or    OLANZapine  5 mg Intramuscular Q6H PRN Frederick Smith MD      OLANZapine  2.5 mg Oral Q3H PRN Frederick Smith MD      OXcarbazepine  450 mg Oral Q12H TASHA Frederick Smith MD      oxybutynin  5 mg Oral BID Wendy Anderson, CRNP      paliperidone  234 mg IM- Deltoid Q4 weeks Virginia Mccullough, CRNP      pantoprazole  40 mg Oral Early Morning Wendy Anderson, CRNP      polyethylene glycol  17 g Oral Daily PRN Virginia Mccullough, CRNP      saccharomyces boulardii  250 mg Oral BID Anatoly Prajapati, DO      senna-docusate sodium  1 tablet Oral Daily PRN Virginia Mccullough, CRNP      topiramate  200 mg Oral BID Anatoly Prajapati, DO      traZODone  50 mg Oral HS PRN JHONATAN Anne      warfarin  6 mg Oral Daily (warfarin) Dianna  JHONATAN Mcrae         Risks / Benefits of Treatment:  Risks, benefits, and possible side effects of medications explained to patient and patient verbalizes understanding and agreement for treatment.    Counseling / Coordination of Care:  Patient's progress reviewed with nursing staff.  Medications, treatment progress and treatment plan reviewed with patient.  Supportive counseling provided to the patient.    Total floor/unit time spent today 25 minutes. Greater than 50% of total time was spent with the patient and / or family counseling and / or coordination of care. A description of the counseling / coordination of care: medication education, treatment plan, supportive therapy.

## 2024-01-09 NOTE — PROGRESS NOTES
01/09/24 0748   Team Meeting   Meeting Type Daily Rounds   Initial Conference Date 01/09/24   Next Conference Date 01/10/24   Team Members Present   Team Members Present Physician;Nurse;;;Occupational Therapist   Physician Team Member YOU Rodriguez   Nursing Team Member Jennifer   Care Management Team Member Atiya   Social Work Team Member Dalia   OT Team Member Yeison   Patient/Family Present   Patient Present No   Patient's Family Present No     Add Caroline - pharmacy: upset regarding EAC, upset thinks that we are treating other patients better than her, [facility] meeting on Friday, discharge pending.

## 2024-01-09 NOTE — PROGRESS NOTES
Pt attended group.  Superficial and incongruent affect.      01/09/24 1300   Activity/Group Checklist   Group Other (Comment)  (Journaling and mindfulness)   Attendance Attended   Attendance Duration (min) 46-60   Interactions Interacted appropriately   Affect/Mood Constricted   Goals Achieved Identified feelings;Discussed coping strategies;Discussed self-esteem issues;Able to listen to others;Able to engage in interactions;Able to reflect/comment on own behavior;Able to manage/cope with feelings;Verbalized increased hopefulness;Able to self-disclose

## 2024-01-09 NOTE — SOCIAL WORK
Pt encouraged pt to attend groups.  Pt irritable edge and stating she can not attend due to covid.  Pt is not symptomatic and encouraged pt to attend group with mask (as she has been doing) and further educated on covid protocols.  Pt dwelling on interaction with staff on Saturday.     Advised Nursing of pt behaviors and to attend group as therapeutic programming and group support.

## 2024-01-09 NOTE — PLAN OF CARE
Problem: Risk for Self Injury/Neglect  Goal: Treatment Goal: Remain safe during length of stay, learn and adopt new coping skills, and be free of self-injurious ideation, impulses and acts at the time of discharge  Outcome: Progressing  Goal: Refrain from harming self  Description: Interventions:  - Monitor patient closely, per order  - Develop a trusting relationship  - Supervise medication ingestion, monitor effects and side effects   Outcome: Progressing  Goal: Recognize maladaptive responses and adopt new coping mechanisms  Outcome: Progressing  Goal: Complete daily ADLs, including personal hygiene independently, as able  Description: Interventions:  - Observe, teach, and assist patient with ADLS  - Monitor and promote a balance of rest/activity, with adequate nutrition and elimination  Outcome: Progressing     Problem: Depression  Goal: Treatment Goal: Demonstrate behavioral control of depressive symptoms, verbalize feelings of improved mood/affect, and adopt new coping skills prior to discharge  Outcome: Not Progressing  Goal: Verbalize thoughts and feelings  Description: Interventions:  - Assess and re-assess patient's level of risk   - Engage patient in 1:1 interactions, daily, for a minimum of 15 minutes   - Encourage patient to express feelings, fears, frustrations, hopes   Outcome: Progressing  Goal: Refrain from harming self  Description: Interventions:  - Monitor patient closely, per order   - Supervise medication ingestion, monitor effects and side effects   Outcome: Progressing  Goal: Refrain from isolation  Description: Interventions:  - Develop a trusting relationship   - Encourage socialization   Outcome: Not Progressing  Goal: Refrain from self-neglect  Outcome: Progressing     Problem: Risk for Violence/Aggression Toward Others  Goal: Treatment Goal: Refrain from acts of violence/aggression during length of stay, and demonstrate improved impulse control at the time of discharge  Outcome:  Progressing  Goal: Refrain from harming others  Outcome: Progressing  Goal: Refrain from destructive acts on the environment or property  Outcome: Progressing  Goal: Control angry outbursts  Description: Interventions:  - Monitor patient closely, per order  - Ensure early verbal de-escalation  - Monitor prn medication needs  - Set reasonable/therapeutic limits, outline behavioral expectations, and consequences   - Provide a non-threatening milieu, utilizing the least restrictive interventions   Outcome: Progressing  Goal: Identify appropriate positive anger management techniques  Description: Interventions:  - Offer anger management and coping skills groups   - Staff will provide positive feedback for appropriate anger control  Outcome: Not Progressing

## 2024-01-10 PROCEDURE — 99232 SBSQ HOSP IP/OBS MODERATE 35: CPT | Performed by: STUDENT IN AN ORGANIZED HEALTH CARE EDUCATION/TRAINING PROGRAM

## 2024-01-10 RX ADMIN — FUROSEMIDE 20 MG: 20 TABLET ORAL at 08:08

## 2024-01-10 RX ADMIN — OXYBUTYNIN CHLORIDE 5 MG: 5 TABLET ORAL at 17:13

## 2024-01-10 RX ADMIN — ATOMOXETINE 40 MG: 40 CAPSULE ORAL at 08:11

## 2024-01-10 RX ADMIN — OXCARBAZEPINE 450 MG: 150 TABLET, FILM COATED ORAL at 08:08

## 2024-01-10 RX ADMIN — TOPIRAMATE 200 MG: 100 TABLET, FILM COATED ORAL at 17:13

## 2024-01-10 RX ADMIN — WARFARIN SODIUM 6 MG: 2 TABLET ORAL at 17:12

## 2024-01-10 RX ADMIN — NALTREXONE HYDROCHLORIDE 50 MG: 50 TABLET, FILM COATED ORAL at 08:09

## 2024-01-10 RX ADMIN — CLONIDINE HYDROCHLORIDE 0.2 MG: 0.1 TABLET ORAL at 21:53

## 2024-01-10 RX ADMIN — OXYBUTYNIN CHLORIDE 5 MG: 5 TABLET ORAL at 08:09

## 2024-01-10 RX ADMIN — MELATONIN TAB 3 MG 3 MG: 3 TAB at 21:53

## 2024-01-10 RX ADMIN — TOPIRAMATE 200 MG: 100 TABLET, FILM COATED ORAL at 08:08

## 2024-01-10 RX ADMIN — CLONIDINE HYDROCHLORIDE 0.2 MG: 0.1 TABLET ORAL at 08:08

## 2024-01-10 RX ADMIN — OXCARBAZEPINE 450 MG: 150 TABLET, FILM COATED ORAL at 21:53

## 2024-01-10 RX ADMIN — PANTOPRAZOLE SODIUM 40 MG: 40 TABLET, DELAYED RELEASE ORAL at 05:48

## 2024-01-10 RX ADMIN — LOSARTAN POTASSIUM 100 MG: 50 TABLET, FILM COATED ORAL at 08:07

## 2024-01-10 RX ADMIN — CYANOCOBALAMIN TAB 1000 MCG 1000 MCG: 1000 TAB at 08:08

## 2024-01-10 RX ADMIN — LEVOTHYROXINE SODIUM 125 MCG: 125 TABLET ORAL at 05:48

## 2024-01-10 RX ADMIN — Medication 250 MG: at 08:08

## 2024-01-10 RX ADMIN — BUPROPION HYDROCHLORIDE 450 MG: 300 TABLET, EXTENDED RELEASE ORAL at 08:08

## 2024-01-10 RX ADMIN — Medication 250 MG: at 17:13

## 2024-01-10 NOTE — NURSING NOTE
Pt is calm and cooperative with care. Pt is visible on the unit and social with peers. Pt has good intake at meals time and takes medications as scheduled . Pt denies SI/HI/AH/VH,depressions and anxiety. Will maintain q 7 mins checks.

## 2024-01-10 NOTE — PROGRESS NOTES
"Progress Note - Behavioral Health   Blanca Mason 53 y.o. female MRN: [id number]  Unit/Bed#: [facility] 651-02 Encounter: [id number]    Patient was seen today for continuation of care, records reviewed and patient was discussed with the morning case review team.    Blanca was seen today for psychiatric follow-up. Per staff, no worsening behavioral issues overnight. She is seen at bedside, somewhat tearful. She states this is, \"Im a good way\" after having a conversation with a friend of hers who she had not spoken to in awhile. She is asking writer today if it is possible to reduce her [facility] stay since she has had, \"good behavior.\" Otherwise denies any significant change in mood. Preservative on another female peer asking to be roommates.      Blanca denies acute suicidal/self-harm ideation/intent/plan upon direct inquiry at this time.  Blanca continues to display a pattern of maladaptive behaviors but no worsening aggression or agitation.  Blanca also denies HI/AH/VH, and does not appear overtly manic. Still paranoid with some staff and peers. Impulse control is limited.  Blanca remains adherent to her current psychotropic medication regimen and denies any side effects from medications, as well as none noted on exam.    Vitals:  Vitals:    01/10/24 0745   BP: 132/70   Pulse: 79   Resp: 16   Temp: (!) 97.1 °F (36.2 °C)   SpO2: 91%       Laboratory Results:  I have personally reviewed all pertinent laboratory/tests results.  Most Recent Labs:   Lab Results   Component Value Date    WBC 8.96 12/02/2023    RBC 4.34 12/02/2023    HGB 12.9 12/02/2023    HCT 40.5 12/02/2023     12/02/2023    RDW 14.2 12/02/2023    TOTANEUTABS 4.73 11/19/2023    NEUTROABS 5.77 12/02/2023    SODIUM 139 12/02/2023    K 3.8 12/02/2023     12/02/2023    CO2 21 12/02/2023    BUN 13 12/02/2023    CREATININE 0.62 12/02/2023    GLUC 107 12/02/2023    GLUF 107 (H) 12/02/2023    CALCIUM 8.7 12/02/2023    AST 13 12/02/2023    ALT 13 12/02/2023    " ALKPHOS 55 12/02/2023    TP 6.0 (L) 12/02/2023    ALB 3.5 12/02/2023    TBILI 0.25 12/02/2023    CHOLESTEROL 234 (H) 03/09/2023    HDL 71 03/09/2023    TRIG 153 (H) 03/09/2023    LDLCALC 132 (H) 03/09/2023    NONHDLC 163 03/09/2023    VALPROICTOT 23 (L) 11/10/2023    AMMONIA 63 11/06/2023    [id number] 4.324 12/02/2023    FREET4 0.81 11/06/2023    T3FREE 2.27 (L) 10/16/2019    PREGUR negative 07/13/2022    PREGSERUM Negative 11/06/2023    RPR Non-Reactive 07/17/2022    HGBA1C 5.0 01/05/2023    EAG 97 01/05/2023       Psychiatric Review of Systems:  Behavior over the last 24 hours:  unchanged  Sleep: fair  Appetite: fair  Medication side effects: Denies  ROS: no complaints, denies shortness of breath or chest pain and all other systems are negative for acute changes    Mental Status Evaluation:  Appearance:  age appropriate, casually dressed, dressed appropriately, marginal hygiene   Behavior:  pleasant, cooperative, calm   Speech:  normal rate, normal volume, normal pitch   Mood:  anxious   Affect:  tearful   Thought Process:  perseverative, concrete   Thought Content:  no overt delusions   Perceptual Disturbances: no auditory hallucinations, no visual hallucinations   Risk Potential: Suicidal ideation - None at present  Homicidal ideation - None at present  Potential for aggression - No   Memory:  recent and remote memory grossly intact   Sensorium  person, place, and time/date      Consciousness:  alert and awake   Attention: attention span and concentration are age appropriate   Insight:  limited   Judgment: limited   Gait/Station: normal gait/station   Motor Activity: no abnormal movements   Progress Toward Goals:   Blanca is progressing towards goals of inpatient psychiatric treatment by continued medication compliance and is attending therapeutic modalities on the milieu. However, the patient continues to require inpatient psychiatric hospitalization for continued medication management and titration to  optimize symptom reduction, improve sleep hygiene, and demonstrate adequate self-care.    Assessment/Plan   Principal Problem:    Schizoaffective disorder, bipolar type (HCC)  Active Problems:    Benign essential hypertension    Edema    Hypothyroidism    MAG (obstructive sleep apnea)    Overactive bladder    Sjogren's syndrome (HCC)    Medical clearance for psychiatric admission    Anxiety    Borderline personality disorder (HCC)    History of pulmonary embolism    Ingrown toenail      Recommended Treatment: Treatment plan and medication changes discussed and per the attending physician the plan is:    1.Continue with group therapy, milieu therapy and occupational therapy  2.Behavioral Health checks every 7 minutes  3.Continue frequent safety checks and vitals per unit protocol  4.Continue with SLIM medical management as indicated  5.Continue with current medication regimen:  Atomoxetine 40 mg QAM for ADHD  Wellbutrin  mg QAM for depression  Clonidine 0.2 mg BID for ADHD & impulsivity  Naltrexone 50 mg QAM for impulsivity  Trileptal 450 mg BID for mood stabilization  Invega 234 every month for psychosis and mood  Topamax 200 mg BID for mood stabilization  6.Will review labs in the a.m.  7.Disposition Planning: Discharge planning and efforts remain ongoing; awaiting EAC referral & [facility] meeting this week    Behavioral Health Medications: all current active meds have been reviewed.  Current Facility-Administered Medications   Medication Dose Route Frequency Provider Last Rate    aluminum-magnesium hydroxide-simethicone  30 mL Oral Q4H PRN JHONATAN Fields      atoMOXetine  40 mg Oral Daily Anatoly Prajapati,       buPROPion  450 mg Oral Daily Anatoly Prajapati, DO      cloNIDine  0.2 mg Oral Q12H [facility] JHONATAN Anne      cyanocobalamin  1,000 mcg Oral Daily JHONATAN Berg      cyanocobalamin  1,000 mcg Intramuscular Q30 Days JHONATAN Berg      ergocalciferol  50,000  Units Oral Weekly Dianna Mcrae, CRNP      furosemide  20 mg Oral Daily Wendy Anderson, CRNP      hydrocortisone   Topical 4x Daily PRN Dianna Mcrae, CRNP      hydrOXYzine HCL  25 mg Oral Q6H PRN Max 4/day Virginia Mccullough, CRNP      hydrOXYzine HCL  50 mg Oral Q6H PRN Max 4/day Virginia Mccullough, CRNP      ibuprofen  200 mg Oral Q6H PRN Virginia Mccullough, CRNP      ibuprofen  400 mg Oral Q6H PRN Virginia Mccullough, CRNP      ibuprofen  600 mg Oral Q8H PRN Virginia Mccullough, CRNP      levothyroxine  125 mcg Oral Early Morning Wendy Anderson, CRNP      loperamide  2 mg Oral Q4H PRN Anatoly Prajapati, DO      LORazepam  1 mg Intramuscular Q6H PRN Max 3/day Virginia Mccullough, CRNP      LORazepam  1 mg Oral Q6H PRN Max 3/day Virginia Mccullough, CRNP      losartan  100 mg Oral Daily Wendy Anderson, CRNP      melatonin  3 mg Oral HS Frederick Smith MD      naltrexone  50 mg Oral Daily Anatoly Prajapati, DO      OLANZapine  10 mg Oral Q6H PRN Frederick Smith MD      Or    OLANZapine  10 mg Intramuscular Q6H PRN Frederick Smith MD      OLANZapine  5 mg Oral Q6H PRN Frederick Smith MD      Or    OLANZapine  5 mg Intramuscular Q6H PRN Frederick Smith MD      OLANZapine  2.5 mg Oral Q3H PRN Frederick Smith MD      OXcarbazepine  450 mg Oral Q12H TASHA Frederick Smith MD      oxybutynin  5 mg Oral BID Wendy Anderson, CRNP      paliperidone  234 mg IM- Deltoid Q4 weeks Virginia Mccullough, CRNP      pantoprazole  40 mg Oral Early Morning Wendy Anderson, CRNP      polyethylene glycol  17 g Oral Daily PRN Virginia Mccullough, CRNP      saccharomyces boulardii  250 mg Oral BID Anatoly Prajapati, DO      senna-docusate sodium  1 tablet Oral Daily PRN Virginia Mccullough, CRNP      topiramate  200 mg Oral BID Anatoly Prajapati, DO      traZODone  50 mg Oral HS PRN Virginia Mccullough, CRNP      warfarin  6 mg Oral Daily (warfarin) JHONATAN Berg         Risks / Benefits of Treatment:  Risks, benefits, and  possible side effects of medications explained to patient and patient verbalizes understanding and agreement for treatment.    Counseling / Coordination of Care:  Patient's progress reviewed with nursing staff.  Medications, treatment progress and treatment plan reviewed with patient.  Supportive counseling provided to the patient.    Total floor/unit time spent today 25 minutes. Greater than 50% of total time was spent with the patient and / or family counseling and / or coordination of care. A description of the counseling / coordination of care: medication education, treatment plan, supportive therapy.

## 2024-01-10 NOTE — TREATMENT TEAM
Pt attended 1 am group.  Pt refused afternoon group while waiting outside CM office.  Encouraged pt to attend group.       01/10/24 1330   Activity/Group Checklist   Group Other (Comment)  (coummincation and boundaries)   Attendance Refused;Other (Comment)  (stated she needed to wait outside  office to speak)

## 2024-01-10 NOTE — PROGRESS NOTES
01/10/24 1100   Activity/Group Checklist   Group Exercise   Attendance Attended   Attendance Duration (min) 31-45   Interactions Other (Comment)  (jovial with another energized peer.Did fully engage in the seated muscial exercises.)   Affect/Mood Bright   Goals Achieved Able to engage in interactions;Able to listen to others;Discussed coping strategies

## 2024-01-10 NOTE — PROGRESS NOTES
01/10/24 0810   Team Meeting   Meeting Type Daily Rounds   Initial Conference Date 01/10/24   Next Conference Date 01/11/24   Team Members Present   Team Members Present Physician;Nurse;;;Occupational Therapist   Physician Team Member YOU Rodriguez   Nursing Team Member Jennifer   Care Management Team Member Aitya   Social Work Team Member Dalia   OT Team Member Yeison   Patient/Family Present   Patient Present No   Patient's Family Present No     [facility] meeting on Friday, discharge pending.

## 2024-01-10 NOTE — PLAN OF CARE
Problem: Risk for Self Injury/Neglect  Goal: Treatment Goal: Remain safe during length of stay, learn and adopt new coping skills, and be free of self-injurious ideation, impulses and acts at the time of discharge  Outcome: Progressing  Goal: Verbalize thoughts and feelings  Description: Interventions:  - Assess and re-assess patient's lethality and potential for self-injury  - Engage patient in 1:1 interactions, daily, for a minimum of 15 minutes  - Encourage patient to express feelings, fears, frustrations, hopes  - Establish rapport/trust with patient   Outcome: Progressing  Goal: Refrain from harming self  Description: Interventions:  - Monitor patient closely, per order  - Develop a trusting relationship  - Supervise medication ingestion, monitor effects and side effects   Outcome: Progressing  Goal: Attend and participate in unit activities, including therapeutic, recreational, and educational groups  Description: Interventions:  - Provide therapeutic and educational activities daily, encourage attendance and participation, and document same in the medical record  - Obtain collateral information, encourage visitation and family involvement in care   Outcome: Progressing  Goal: Recognize maladaptive responses and adopt new coping mechanisms  Outcome: Progressing  Goal: Complete daily ADLs, including personal hygiene independently, as able  Description: Interventions:  - Observe, teach, and assist patient with ADLS  - Monitor and promote a balance of rest/activity, with adequate nutrition and elimination  Outcome: Progressing     Problem: Depression  Goal: Treatment Goal: Demonstrate behavioral control of depressive symptoms, verbalize feelings of improved mood/affect, and adopt new coping skills prior to discharge  Outcome: Progressing  Goal: Refrain from isolation  Description: Interventions:  - Develop a trusting relationship   - Encourage socialization   Outcome: Progressing  Goal: Refrain from  self-neglect  Outcome: Progressing     Problem: Anxiety  Goal: Anxiety is at manageable level  Description: Interventions:  - Assess and monitor patient's anxiety level.   - Monitor for signs and symptoms (heart palpitations, chest pain, shortness of breath, headaches, nausea, feeling jumpy, restlessness, irritable, apprehensive).   - Collaborate with interdisciplinary team and initiate plan and interventions as ordered.  - [place] patient to unit/surroundings  - Explain treatment plan  - Encourage participation in care  - Encourage verbalization of concerns/fears  - Identify coping mechanisms  - Assist in developing anxiety-reducing skills  - Administer/offer alternative therapies  - Limit or eliminate stimulants  Outcome: Progressing     Problem: Risk for Violence/Aggression Toward Others  Goal: Treatment Goal: Refrain from acts of violence/aggression during length of stay, and demonstrate improved impulse control at the time of discharge  Outcome: Progressing  Goal: Verbalize thoughts and feelings  Description: Interventions:  - Assess and re-assess patient's level of risk, every waking shift  - Engage patient in 1:1 interactions, daily, for a minimum of 15 minutes   - Allow patient to express feelings and frustrations in a safe and non-threatening manner   - Establish rapport/trust with patient   Outcome: Progressing  Goal: Refrain from harming others  Outcome: Progressing  Goal: Refrain from destructive acts on the environment or property  Outcome: Progressing  Goal: Control angry outbursts  Description: Interventions:  - Monitor patient closely, per order  - Ensure early verbal de-escalation  - Monitor prn medication needs  - Set reasonable/therapeutic limits, outline behavioral expectations, and consequences   - Provide a non-threatening milieu, utilizing the least restrictive interventions   Outcome: Progressing  Goal: Identify appropriate positive anger management techniques  Description: Interventions:  -  Offer anger management and coping skills groups   - Staff will provide positive feedback for appropriate anger control  Outcome: Progressing

## 2024-01-11 LAB
INR PPP: 2.35 (ref 0.84–1.19)
PROTHROMBIN TIME: 26 SECONDS (ref 11.6–14.5)

## 2024-01-11 PROCEDURE — 99232 SBSQ HOSP IP/OBS MODERATE 35: CPT | Performed by: STUDENT IN AN ORGANIZED HEALTH CARE EDUCATION/TRAINING PROGRAM

## 2024-01-11 PROCEDURE — 85610 PROTHROMBIN TIME: CPT | Performed by: NURSE PRACTITIONER

## 2024-01-11 RX ORDER — WARFARIN SODIUM 2 MG/1
6 TABLET ORAL
Status: DISCONTINUED | OUTPATIENT
Start: 2024-01-11 | End: 2024-01-16

## 2024-01-11 RX ADMIN — WARFARIN SODIUM 6 MG: 2 TABLET ORAL at 17:10

## 2024-01-11 RX ADMIN — OXYBUTYNIN CHLORIDE 5 MG: 5 TABLET ORAL at 08:11

## 2024-01-11 RX ADMIN — NALTREXONE HYDROCHLORIDE 50 MG: 50 TABLET, FILM COATED ORAL at 08:10

## 2024-01-11 RX ADMIN — FUROSEMIDE 20 MG: 20 TABLET ORAL at 08:10

## 2024-01-11 RX ADMIN — ATOMOXETINE 40 MG: 40 CAPSULE ORAL at 08:10

## 2024-01-11 RX ADMIN — LOSARTAN POTASSIUM 100 MG: 50 TABLET, FILM COATED ORAL at 08:10

## 2024-01-11 RX ADMIN — OXYBUTYNIN CHLORIDE 5 MG: 5 TABLET ORAL at 17:10

## 2024-01-11 RX ADMIN — CYANOCOBALAMIN TAB 1000 MCG 1000 MCG: 1000 TAB at 08:11

## 2024-01-11 RX ADMIN — CLONIDINE HYDROCHLORIDE 0.2 MG: 0.1 TABLET ORAL at 08:11

## 2024-01-11 RX ADMIN — Medication 250 MG: at 17:10

## 2024-01-11 RX ADMIN — TOPIRAMATE 200 MG: 100 TABLET, FILM COATED ORAL at 08:10

## 2024-01-11 RX ADMIN — PANTOPRAZOLE SODIUM 40 MG: 40 TABLET, DELAYED RELEASE ORAL at 06:14

## 2024-01-11 RX ADMIN — TOPIRAMATE 200 MG: 100 TABLET, FILM COATED ORAL at 17:10

## 2024-01-11 RX ADMIN — MELATONIN TAB 3 MG 3 MG: 3 TAB at 21:23

## 2024-01-11 RX ADMIN — Medication 250 MG: at 08:10

## 2024-01-11 RX ADMIN — OXCARBAZEPINE 450 MG: 150 TABLET, FILM COATED ORAL at 21:23

## 2024-01-11 RX ADMIN — LEVOTHYROXINE SODIUM 125 MCG: 125 TABLET ORAL at 06:14

## 2024-01-11 RX ADMIN — BUPROPION HYDROCHLORIDE 450 MG: 300 TABLET, EXTENDED RELEASE ORAL at 08:10

## 2024-01-11 RX ADMIN — OXCARBAZEPINE 450 MG: 150 TABLET, FILM COATED ORAL at 08:10

## 2024-01-11 RX ADMIN — CLONIDINE HYDROCHLORIDE 0.2 MG: 0.1 TABLET ORAL at 21:23

## 2024-01-11 NOTE — PLAN OF CARE
Problem: Ineffective Coping  Goal: Identifies ineffective coping skills  Outcome: Progressing  Goal: Identifies healthy coping skills  Outcome: Progressing  Goal: Demonstrates healthy coping skills  Outcome: Progressing  Goal: Participates in unit activities  Description: Interventions:  - Provide therapeutic environment   - Provide required programming   - Redirect inappropriate behaviors   Outcome: Progressing  Goal: Patient/Family participate in treatment and DC plans  Description: Interventions:  - Provide therapeutic environment  Outcome: Progressing  Goal: Patient/Family verbalizes awareness of resources  Outcome: Progressing  Goal: Understands least restrictive measures  Description: Interventions:  - Utilize least restrictive behavior  Outcome: Progressing  Goal: Free from restraint events  Description: - Utilize least restrictive measures   - Provide behavioral interventions   - Redirect inappropriate behaviors   Outcome: Progressing     Problem: Risk for Self Injury/Neglect  Goal: Treatment Goal: Remain safe during length of stay, learn and adopt new coping skills, and be free of self-injurious ideation, impulses and acts at the time of discharge  Outcome: Progressing  Goal: Verbalize thoughts and feelings  Description: Interventions:  - Assess and re-assess patient's lethality and potential for self-injury  - Engage patient in 1:1 interactions, daily, for a minimum of 15 minutes  - Encourage patient to express feelings, fears, frustrations, hopes  - Establish rapport/trust with patient   Outcome: Progressing  Goal: Refrain from harming self  Description: Interventions:  - Monitor patient closely, per order  - Develop a trusting relationship  - Supervise medication ingestion, monitor effects and side effects   Outcome: Progressing  Goal: Attend and participate in unit activities, including therapeutic, recreational, and educational groups  Description: Interventions:  - Provide therapeutic and  educational activities daily, encourage attendance and participation, and document same in the medical record  - Obtain collateral information, encourage visitation and family involvement in care   Outcome: Progressing  Goal: Recognize maladaptive responses and adopt new coping mechanisms  Outcome: Progressing  Goal: Complete daily ADLs, including personal hygiene independently, as able  Description: Interventions:  - Observe, teach, and assist patient with ADLS  - Monitor and promote a balance of rest/activity, with adequate nutrition and elimination  Outcome: Progressing     Problem: Depression  Goal: Treatment Goal: Demonstrate behavioral control of depressive symptoms, verbalize feelings of improved mood/affect, and adopt new coping skills prior to discharge  Outcome: Progressing  Goal: Verbalize thoughts and feelings  Description: Interventions:  - Assess and re-assess patient's level of risk   - Engage patient in 1:1 interactions, daily, for a minimum of 15 minutes   - Encourage patient to express feelings, fears, frustrations, hopes   Outcome: Progressing  Goal: Refrain from harming self  Description: Interventions:  - Monitor patient closely, per order   - Supervise medication ingestion, monitor effects and side effects   Outcome: Progressing  Goal: Refrain from isolation  Description: Interventions:  - Develop a trusting relationship   - Encourage socialization   Outcome: Progressing  Goal: Refrain from self-neglect  Outcome: Progressing  Goal: Attend and participate in unit activities, including therapeutic, recreational, and educational groups  Description: Interventions:  - Provide therapeutic and educational activities daily, encourage attendance and participation, and document same in the medical record   Outcome: Progressing  Goal: Complete daily ADLs, including personal hygiene independently, as able  Description: Interventions:  - Observe, teach, and assist patient with ADLS  -  Monitor and promote  a balance of rest/activity, with adequate nutrition and elimination   Outcome: Progressing     Problem: Anxiety  Goal: Anxiety is at manageable level  Description: Interventions:  - Assess and monitor patient's anxiety level.   - Monitor for signs and symptoms (heart palpitations, chest pain, shortness of breath, headaches, nausea, feeling jumpy, restlessness, irritable, apprehensive).   - Collaborate with interdisciplinary team and initiate plan and interventions as ordered.  - [place] patient to unit/surroundings  - Explain treatment plan  - Encourage participation in care  - Encourage verbalization of concerns/fears  - Identify coping mechanisms  - Assist in developing anxiety-reducing skills  - Administer/offer alternative therapies  - Limit or eliminate stimulants  Outcome: Progressing     Problem: Risk for Violence/Aggression Toward Others  Goal: Treatment Goal: Refrain from acts of violence/aggression during length of stay, and demonstrate improved impulse control at the time of discharge  Outcome: Progressing  Goal: Verbalize thoughts and feelings  Description: Interventions:  - Assess and re-assess patient's level of risk, every waking shift  - Engage patient in 1:1 interactions, daily, for a minimum of 15 minutes   - Allow patient to express feelings and frustrations in a safe and non-threatening manner   - Establish rapport/trust with patient   Outcome: Progressing  Goal: Refrain from harming others  Outcome: Progressing  Goal: Refrain from destructive acts on the environment or property  Outcome: Progressing  Goal: Control angry outbursts  Description: Interventions:  - Monitor patient closely, per order  - Ensure early verbal de-escalation  - Monitor prn medication needs  - Set reasonable/therapeutic limits, outline behavioral expectations, and consequences   - Provide a non-threatening milieu, utilizing the least restrictive interventions   Outcome: Progressing  Goal: Attend and participate in unit  activities, including therapeutic, recreational, and educational groups  Description: Interventions:  - Provide therapeutic and educational activities daily, encourage attendance and participation, and document same in the medical record   Outcome: Progressing  Goal: Identify appropriate positive anger management techniques  Description: Interventions:  - Offer anger management and coping skills groups   - Staff will provide positive feedback for appropriate anger control  Outcome: Progressing

## 2024-01-11 NOTE — PROGRESS NOTES
Progress Note - Behavioral Health   Blanca Mason 53 y.o. female MRN: [id number]  Unit/Bed#: [facility] 651-02 Encounter: [id number]    Patient was seen today for continuation of care, records reviewed and patient was discussed with the morning case review team.    Blanca was seen today for psychiatric follow-up.  On assessment today, Blanca was still tearful.  Perseverating on her cat this morning, patient states that she misses him and that she wishes she could go home.  Reeducated about coping skills and anger management as patient has low frustration threshold.  No behavioral issues overnight, no as needed medications given.  Invega Sustenna 234 mg to be given on 2/4/2024, no adverse effects reported.  Discharge disposition ongoing as patient will need EAC placement due to elevated suicidality despite ACT services in the community.    Blanca denies acute suicidal/self-harm ideation/intent/plan upon direct inquiry at this time.  Blanca remains behaviorally appropriate, no agitation or aggression noted on exam or in report.  Blanca also denies HI/AH/VH, and does not appear overtly manic.  No overt delusions or paranoia are verbalized. Impulse control is intact.  Blanca remains adherent to her current psychotropic medication regimen and denies any side effects from medications, as well as none noted on exam.    Vitals:  Vitals:    01/11/24 0736   BP: 122/71   Pulse: 94   Resp: 18   Temp: 97.5 °F (36.4 °C)   SpO2: 94%       Laboratory Results:  I have personally reviewed all pertinent laboratory/tests results.  Most Recent Labs:   Lab Results   Component Value Date    WBC 8.96 12/02/2023    RBC 4.34 12/02/2023    HGB 12.9 12/02/2023    HCT 40.5 12/02/2023     12/02/2023    RDW 14.2 12/02/2023    TOTANEUTABS 4.73 11/19/2023    NEUTROABS 5.77 12/02/2023    SODIUM 139 12/02/2023    K 3.8 12/02/2023     12/02/2023    CO2 21 12/02/2023    BUN 13 12/02/2023    CREATININE 0.62 12/02/2023    GLUC 107 12/02/2023    GLUF 107  (H) 12/02/2023    CALCIUM 8.7 12/02/2023    AST 13 12/02/2023    ALT 13 12/02/2023    ALKPHOS 55 12/02/2023    TP 6.0 (L) 12/02/2023    ALB 3.5 12/02/2023    TBILI 0.25 12/02/2023    CHOLESTEROL 234 (H) 03/09/2023    HDL 71 03/09/2023    TRIG 153 (H) 03/09/2023    LDLCALC 132 (H) 03/09/2023    NONHDLC 163 03/09/2023    VALPROICTOT 23 (L) 11/10/2023    AMMONIA 63 11/06/2023    KLM6ZHDSSQHK 4.324 12/02/2023    FREET4 0.81 11/06/2023    T3FREE 2.27 (L) 10/16/2019    PREGUR negative 07/13/2022    PREGSERUM Negative 11/06/2023    RPR Non-Reactive 07/17/2022    HGBA1C 5.0 01/05/2023    EAG 97 01/05/2023       Psychiatric Review of Systems:  Behavior over the last 24 hours:  unchanged.   Sleep: good  Appetite: good  Medication side effects: none  ROS: no complaints, denies shortness of breath or chest pain and all other systems are negative for acute changes    Mental Status Evaluation:  Appearance:  adequate grooming   Behavior:  still guarded   Speech:  normal rate and volume   Mood:  less labile   Affect:  constricted   Thought Process:  linear   Thought Content:  no overt delusions   Perceptual Disturbances: denies auditory hallucinations when asked, does not appear responding to internal stimuli   Risk Potential: Suicidal ideation - None  Homicidal ideation - None  Potential for aggression - No   Memory:  recent and remote memory grossly intact   Sensorium  person, place, and time/date      Consciousness:  alert and awake   Attention: attention span and concentration are age appropriate   Insight:  limited   Judgment: limited   Gait/Station: normal gait/station   Motor Activity: no abnormal movements   Progress Toward Goals:   Blanca is progressing towards goals of inpatient psychiatric treatment by continued medication compliance and is attending therapeutic modalities on the milieu. However, the patient continues to require inpatient psychiatric hospitalization for continued medication management and titration to  optimize symptom reduction, improve sleep hygiene, and demonstrate adequate self-care.    Assessment/Plan   Principal Problem:    Schizoaffective disorder, bipolar type (HCC)  Active Problems:    Benign essential hypertension    Edema    Hypothyroidism    MAG (obstructive sleep apnea)    Overactive bladder    Sjogren's syndrome (HCC)    Medical clearance for psychiatric admission    Anxiety    Borderline personality disorder (HCC)    History of pulmonary embolism    Ingrown toenail      Recommended Treatment: Treatment plan and medication changes discussed and per the attending physician the plan is:    1.Continue with group therapy, milieu therapy and occupational therapy  2.Behavioral Health checks every 7 minutes  3.Continue frequent safety checks and vitals per unit protocol  4.Continue with [facility] medical management as indicated  5.Continue with current medication regimen  6.Will review labs in the a.m.  7.Disposition Planning: Discharge planning and efforts remain ongoing    Behavioral Health Medications: all current active meds have been reviewed and continue current psychiatric medications.  Current Facility-Administered Medications   Medication Dose Route Frequency Provider Last Rate    aluminum-magnesium hydroxide-simethicone  30 mL Oral Q4H PRN JHONATAN Fields      atoMOXetine  40 mg Oral Daily Anatoly Prajapati, DO      buPROPion  450 mg Oral Daily Anatoly Prajapati, DO      cloNIDine  0.2 mg Oral Q12H [facility] JHONATAN Anne      cyanocobalamin  1,000 mcg Oral Daily JHONATAN Berg      cyanocobalamin  1,000 mcg Intramuscular Q30 Days JHONATAN Berg      ergocalciferol  50,000 Units Oral Weekly JHONATAN Berg      furosemide  20 mg Oral Daily JHONATAN Fields      hydrocortisone   Topical 4x Daily PRN JHONATAN Berg      hydrOXYzine HCL  25 mg Oral Q6H PRN Max 4/day JHONATAN Anne      hydrOXYzine HCL  50 mg Oral Q6H PRN Max  4/day Virginia Mccullough, CRNP      ibuprofen  200 mg Oral Q6H PRN Virginia Mccullough, CRNP      ibuprofen  400 mg Oral Q6H PRN Virginia Mccullough, CRNP      ibuprofen  600 mg Oral Q8H PRN Virginia Mccullough, CRNP      levothyroxine  125 mcg Oral Early Morning Wendy Anderson, CRNP      loperamide  2 mg Oral Q4H PRN Anatoly Prajapati, DO      LORazepam  1 mg Intramuscular Q6H PRN Max 3/day Virginia Mccullough, CRNP      LORazepam  1 mg Oral Q6H PRN Max 3/day Virginia Mccullough, CRNP      losartan  100 mg Oral Daily Wendy Anderson, CRNP      melatonin  3 mg Oral HS Frederick Smith MD      naltrexone  50 mg Oral Daily Anatoly Prajapati, DO      OLANZapine  10 mg Oral Q6H PRN Frederick Smith MD      Or    OLANZapine  10 mg Intramuscular Q6H PRN Frederick Smith MD      OLANZapine  5 mg Oral Q6H PRN Frederick Smith MD      Or    OLANZapine  5 mg Intramuscular Q6H PRN Frederick Smith MD      OLANZapine  2.5 mg Oral Q3H PRN Frederick Smith MD      OXcarbazepine  450 mg Oral Q12H TASHA Frederick Smith MD      oxybutynin  5 mg Oral BID Wendy Anderson, JHONATAN      paliperidone  234 mg IM- Deltoid Q4 weeks Virginia Mccullough, JHONATAN      pantoprazole  40 mg Oral Early Morning Wendy Anderson, LAURANP      polyethylene glycol  17 g Oral Daily PRN Virginia Mccullough, JHONATAN      saccharomyces boulardii  250 mg Oral BID Anatoly Prajapati, DO      senna-docusate sodium  1 tablet Oral Daily PRN Virginia Mccullough, JHONATAN      topiramate  200 mg Oral BID Anatoly Prajapati, DO      traZODone  50 mg Oral HS PRN Virginia Mccullough, JHONATAN         Risks / Benefits of Treatment:  Risks, benefits, and possible side effects of medications explained to patient and patient verbalizes understanding and agreement for treatment.    Counseling / Coordination of Care:  Patient's progress reviewed with nursing staff.  Medications, treatment progress and treatment plan reviewed with patient.  Supportive counseling provided to the patient.    Total floor/unit time spent  today 25 minutes. Greater than 50% of total time was spent with the patient and / or family counseling and / or coordination of care. A description of the counseling / coordination of care: medication education, treatment plan, supportive therapy.    This note was completed in part utilizing Dragon dictation Software. Grammatical, translation, syntax errors, random word insertions, spelling mistakes, and incomplete sentences may be an occasional consequence of this system secondary to software limitations with voice recognition, ambient noise, and hardware issues. If you have any questions or concerns about the content, text, or information contained within the body of this dictation, please contact the provider for clarification

## 2024-01-11 NOTE — NURSING NOTE
Pt is calm and cooperative with care . Pt is medication compliant ,eat breakfast but refused lunch. Pt reports some anxiety and depression. Denies SI/HI/AVH. Will maintain q 7 mins checks.

## 2024-01-11 NOTE — PROGRESS NOTES
01/11/24 0804   Team Meeting   Meeting Type Daily Rounds   Initial Conference Date 01/11/24   Next Conference Date 01/12/24   Team Members Present   Team Members Present Physician;Nurse;;;Occupational Therapist   Physician Team Member Dr Smith, SHEFALI Lovell   Nursing Team Member Jennifer   Care Management Team Member Atiya   Social Work Team Member Dalia TALBOT Team Member Yeison   Patient/Family Present   Patient Present No   Patient's Family Present No     Doing ok, [facility] meeting for EAC placement. Discharge pending

## 2024-01-11 NOTE — NURSING NOTE
The patient has been withdrawn to her room this evening. She was calm and cooperative. Patient endorses some depression and anxiety. Denies SI/HI/AVH. Patient was med compliant. No acting out behaviors. The patient is excited to be done with Covid Protocol. Safety checks ongoing.

## 2024-01-12 PROCEDURE — 99232 SBSQ HOSP IP/OBS MODERATE 35: CPT | Performed by: STUDENT IN AN ORGANIZED HEALTH CARE EDUCATION/TRAINING PROGRAM

## 2024-01-12 RX ADMIN — PANTOPRAZOLE SODIUM 40 MG: 40 TABLET, DELAYED RELEASE ORAL at 05:38

## 2024-01-12 RX ADMIN — WARFARIN SODIUM 6 MG: 2 TABLET ORAL at 17:09

## 2024-01-12 RX ADMIN — LEVOTHYROXINE SODIUM 125 MCG: 125 TABLET ORAL at 05:38

## 2024-01-12 RX ADMIN — CYANOCOBALAMIN TAB 1000 MCG 1000 MCG: 1000 TAB at 08:10

## 2024-01-12 RX ADMIN — LOSARTAN POTASSIUM 100 MG: 50 TABLET, FILM COATED ORAL at 08:10

## 2024-01-12 RX ADMIN — ATOMOXETINE 40 MG: 40 CAPSULE ORAL at 08:11

## 2024-01-12 RX ADMIN — Medication 250 MG: at 08:12

## 2024-01-12 RX ADMIN — IBUPROFEN 600 MG: 600 TABLET ORAL at 06:07

## 2024-01-12 RX ADMIN — Medication 250 MG: at 17:09

## 2024-01-12 RX ADMIN — OXYBUTYNIN CHLORIDE 5 MG: 5 TABLET ORAL at 08:17

## 2024-01-12 RX ADMIN — OXCARBAZEPINE 450 MG: 150 TABLET, FILM COATED ORAL at 21:21

## 2024-01-12 RX ADMIN — FUROSEMIDE 20 MG: 20 TABLET ORAL at 08:12

## 2024-01-12 RX ADMIN — OXYBUTYNIN CHLORIDE 5 MG: 5 TABLET ORAL at 17:32

## 2024-01-12 RX ADMIN — TOPIRAMATE 200 MG: 100 TABLET, FILM COATED ORAL at 08:11

## 2024-01-12 RX ADMIN — CLONIDINE HYDROCHLORIDE 0.2 MG: 0.1 TABLET ORAL at 08:11

## 2024-01-12 RX ADMIN — CLONIDINE HYDROCHLORIDE 0.2 MG: 0.1 TABLET ORAL at 21:21

## 2024-01-12 RX ADMIN — HYDROXYZINE HYDROCHLORIDE 50 MG: 50 TABLET, FILM COATED ORAL at 10:44

## 2024-01-12 RX ADMIN — BUPROPION HYDROCHLORIDE 450 MG: 300 TABLET, EXTENDED RELEASE ORAL at 08:10

## 2024-01-12 RX ADMIN — NALTREXONE HYDROCHLORIDE 50 MG: 50 TABLET, FILM COATED ORAL at 08:12

## 2024-01-12 RX ADMIN — TOPIRAMATE 200 MG: 100 TABLET, FILM COATED ORAL at 17:09

## 2024-01-12 RX ADMIN — OXCARBAZEPINE 450 MG: 150 TABLET, FILM COATED ORAL at 08:10

## 2024-01-12 RX ADMIN — MELATONIN TAB 3 MG 3 MG: 3 TAB at 21:21

## 2024-01-12 NOTE — TREATMENT TEAM
01/12/24 0756   Team Meeting   Meeting Type Tx Team Meeting   Initial Conference Date 01/12/24   Team Members Present   Team Members Present Physician;Nurse;;;Occupational Therapist   Physician Team Member Virginia Clements   Nursing Team Member Jennifer Guerrero   Care Management Team Member Naima   Social Work Team Member Dalia TALBOT Team Member Yeison   Patient/Family Present   Patient Present No   Patient's Family Present No   Pharmacy: [facility]    [facility] meeting to discuss EAC placement today at 10h. No pending discharge date at this time.

## 2024-01-12 NOTE — TREATMENT TEAM
01/12/24 1041   [place]illon Anxiety Scale   Anxious Mood 2   Tension 2   Fears 2   Insomnia 0   Intellectual 3   Depressed Mood 2   Somatic Complaints: Muscular 2   Somatic Complaints: Sensory 1   Cardiovascular Symptoms 0   Respiratory Symptoms 0   Gastrointestinal Symptoms 0   Genitourinary Symptoms 0   Autonomic Symptoms 0   Behavior at Interview 3   [place]illon Anxiety Score 17     Patient is upset and crying in her room. PRN Atarax 50 mg given

## 2024-01-12 NOTE — PROGRESS NOTES
Progress Note - Behavioral Health   Blanca Mason 53 y.o. female MRN: [id number]  Unit/Bed#: [facility] 651-02 Encounter: [id number]    Patient was seen today for continuation of care, records reviewed and patient was discussed with the morning case review team.    Blanca was seen today for psychiatric follow-up.  On assessment today, Blanca was anxious and guarded.  Stating that she was worried about [facility] meeting today, patient reassured about plan of care including referral to [facility].  No depression reported during interview, patient is noted to be crying and very upset when made aware of postponement of interview.  As needed Atarax given and effective, patient continues to be encouraged to use coping skills to assist with frustrations.  No medication changes to be made at this time.  SNOWDEN Invega Sustenna to be given on 2/4/2024.  Discharge disposition ongoing as patient will benefit from EAC services due to elevated suicidality in the community despite available resources.    Blanca denies acute suicidal/self-harm ideation/intent/plan upon direct inquiry at this time.  Blanca remains behaviorally appropriate, no agitation or aggression noted on exam or in report.  Blanca also denies HI/AH/VH, and does not appear overtly manic.  No overt delusions or paranoia are verbalized. Impulse control is ongoing.  Blanca remains adherent to her current psychotropic medication regimen and denies any side effects from medications, as well as none noted on exam.    Vitals:  Vitals:    01/12/24 0735   BP: 127/69   Pulse: 68   Resp: 16   Temp: (!) 97.4 °F (36.3 °C)   SpO2: 95%       Laboratory Results:  I have personally reviewed all pertinent laboratory/tests results.  Most Recent Labs:   Lab Results   Component Value Date    WBC 8.96 12/02/2023    RBC 4.34 12/02/2023    HGB 12.9 12/02/2023    HCT 40.5 12/02/2023     12/02/2023    RDW 14.2 12/02/2023    TOTANEUTABS 4.73 11/19/2023    NEUTROABS 5.77 12/02/2023    SODIUM 139 12/02/2023     K 3.8 12/02/2023     12/02/2023    CO2 21 12/02/2023    BUN 13 12/02/2023    CREATININE 0.62 12/02/2023    GLUC 107 12/02/2023    GLUF 107 (H) 12/02/2023    CALCIUM 8.7 12/02/2023    AST 13 12/02/2023    ALT 13 12/02/2023    ALKPHOS 55 12/02/2023    TP 6.0 (L) 12/02/2023    ALB 3.5 12/02/2023    TBILI 0.25 12/02/2023    CHOLESTEROL 234 (H) 03/09/2023    HDL 71 03/09/2023    TRIG 153 (H) 03/09/2023    LDLCALC 132 (H) 03/09/2023    NONHDLC 163 03/09/2023    VALPROICTOT 23 (L) 11/10/2023    AMMONIA 63 11/06/2023    VOP2CATPTOYM 4.324 12/02/2023    FREET4 0.81 11/06/2023    T3FREE 2.27 (L) 10/16/2019    PREGUR negative 07/13/2022    PREGSERUM Negative 11/06/2023    RPR Non-Reactive 07/17/2022    HGBA1C 5.0 01/05/2023    EAG 97 01/05/2023       Psychiatric Review of Systems:  Behavior over the last 24 hours:  unchanged.   Sleep: good  Appetite: good  Medication side effects: none  ROS: no complaints, denies shortness of breath or chest pain and all other systems are negative for acute changes    Mental Status Evaluation:  Appearance:  dressed appropriately, adequate grooming   Behavior:  demanding   Speech:  normal rate and volume   Mood:  anxious, labile   Affect:  tearful   Thought Process:  linear   Thought Content:  no overt delusions   Perceptual Disturbances: denies auditory hallucinations when asked, does not appear responding to internal stimuli   Risk Potential: Suicidal ideation - None  Homicidal ideation - None  Potential for aggression - No   Memory:  recent and remote memory grossly intact   Sensorium  person, place, and time/date      Consciousness:  alert and awake   Attention: attention span and concentration are age appropriate   Insight:  moderate   Judgment: limited   Gait/Station: normal gait/station   Motor Activity: no abnormal movements   Progress Toward Goals:   Blanca is progressing towards goals of inpatient psychiatric treatment by continued medication compliance and is attending  therapeutic modalities on the milieu. However, the patient continues to require inpatient psychiatric hospitalization for continued medication management and titration to optimize symptom reduction, improve sleep hygiene, and demonstrate adequate self-care.    Assessment/Plan   Principal Problem:    Schizoaffective disorder, bipolar type (HCC)  Active Problems:    Benign essential hypertension    Edema    Hypothyroidism    MAG (obstructive sleep apnea)    Overactive bladder    Sjogren's syndrome (HCC)    Medical clearance for psychiatric admission    Anxiety    Borderline personality disorder (HCC)    History of pulmonary embolism    Ingrown toenail      Recommended Treatment: Treatment plan and medication changes discussed and per the attending physician the plan is:    1.Continue with group therapy, milieu therapy and occupational therapy  2.Behavioral Health checks every 7 minutes  3.Continue frequent safety checks and vitals per unit protocol  4.Continue with SLIM medical management as indicated  5.Continue with current medication regimen  6.Will review labs in the a.m.  7.Disposition Planning: Discharge planning and efforts remain ongoing    Behavioral Health Medications: all current active meds have been reviewed and continue current psychiatric medications.  Current Facility-Administered Medications   Medication Dose Route Frequency Provider Last Rate    aluminum-magnesium hydroxide-simethicone  30 mL Oral Q4H PRN JHONATAN Fields      atoMOXetine  40 mg Oral Daily Anatoly Prajapati DO      buPROPion  450 mg Oral Daily Anatoly Prajapati DO      cloNIDine  0.2 mg Oral Q12H JHONATAN Martinez      cyanocobalamin  1,000 mcg Oral Daily JHONATAN Berg      cyanocobalamin  1,000 mcg Intramuscular Q30 Days JHONATAN Berg      ergocalciferol  50,000 Units Oral Weekly JHONATAN Berg      furosemide  20 mg Oral Daily JHONATAN Fields      hydrocortisone    Topical 4x Daily PRN Dianna Mcrae, CRNP      hydrOXYzine HCL  25 mg Oral Q6H PRN Max 4/day Virginia Mccullough, CRNP      hydrOXYzine HCL  50 mg Oral Q6H PRN Max 4/day Virginia Mccullough, CRNP      ibuprofen  200 mg Oral Q6H PRN Virginia Mccullough, CRNP      ibuprofen  400 mg Oral Q6H PRN Virginia Mccullough, CRNP      ibuprofen  600 mg Oral Q8H PRN Virginia Mccullough, CRNP      levothyroxine  125 mcg Oral Early Morning Wendy Anderson, CRNP      loperamide  2 mg Oral Q4H PRN Anatoly Prajapati, DO      LORazepam  1 mg Intramuscular Q6H PRN Max 3/day Virginia Mccullough, CRNP      LORazepam  1 mg Oral Q6H PRN Max 3/day Virginia Mccullough, CRNP      losartan  100 mg Oral Daily Wendy Anderson, CRNP      melatonin  3 mg Oral HS Frederick Smith MD      naltrexone  50 mg Oral Daily Anatoly Prajapati, DO      OLANZapine  10 mg Oral Q6H PRN Frederick Smith MD      Or    OLANZapine  10 mg Intramuscular Q6H PRN Frederick Smith MD      OLANZapine  5 mg Oral Q6H PRN Frederick Smith MD      Or    OLANZapine  5 mg Intramuscular Q6H PRN Frederick Smith MD      OLANZapine  2.5 mg Oral Q3H PRN Frederick Smith MD      OXcarbazepine  450 mg Oral Q12H TASHA Frederick Smith MD      oxybutynin  5 mg Oral BID Wendy Anderson, LAURANP      paliperidone  234 mg IM- Deltoid Q4 weeks Virginia Mccullough, CRELKIN      pantoprazole  40 mg Oral Early Morning Wendy Anderson, CRNP      polyethylene glycol  17 g Oral Daily PRN Virginia Mccullough, CRNP      saccharomyces boulardii  250 mg Oral BID Anatoly Prajapati, DO      senna-docusate sodium  1 tablet Oral Daily PRN Virginia Mccullough, LAURANP      topiramate  200 mg Oral BID Anatoly Prajapati, DO      traZODone  50 mg Oral HS PRN Virginia Mccullough, CRNP      warfarin  6 mg Oral Daily (warfarin) JHONATAN Fields         Risks / Benefits of Treatment:  Risks, benefits, and possible side effects of medications explained to patient and patient verbalizes understanding and agreement for  treatment.    Counseling / Coordination of Care:  Patient's progress reviewed with nursing staff.  Medications, treatment progress and treatment plan reviewed with patient.  Supportive counseling provided to the patient.    Total floor/unit time spent today 25 minutes. Greater than 50% of total time was spent with the patient and / or family counseling and / or coordination of care. A description of the counseling / coordination of care: medication education, treatment plan, supportive therapy.    This note was completed in part utilizing Dragon dictation Software. Grammatical, translation, syntax errors, random word insertions, spelling mistakes, and incomplete sentences may be an occasional consequence of this system secondary to software limitations with voice recognition, ambient noise, and hardware issues. If you have any questions or concerns about the content, text, or information contained within the body of this dictation, please contact the provider for clarification

## 2024-01-12 NOTE — NURSING NOTE
"Patient is visible on the unit socializing with peers. She reports anxiety and depression r/t her postponed EAC meeting with [facility]. She states \"I just wish things didn't take so long.\" Support provided. Patient accepted medications. Encouraged to inform staff of any needs.   "

## 2024-01-12 NOTE — NURSING NOTE
Patient is pleasant and cooperative , with poor concentration. Her behaviors and attitude is suspicious but she brightens on approach,  warm and friendly.  Good medication compliance. Impulsive behaviors not  observed. Patient was upset because her meeting was postponed till Tuesday and she became emotional about her stay here and worried about her cat at home. Patient was counseled and PRN was given and that was helpful.   . Denies Si,HI. A,V/H.

## 2024-01-12 NOTE — PROGRESS NOTES
01/12/24 1100 01/12/24 1300   Activity/Group Checklist   Group Community meeting Wellness  (breathing relaxation)   Attendance Attended Attended   Attendance Duration (min) 31-45 16-30   Interactions Other (Comment)  (poor eye contact.Initially stated that her appointment with a community resource was cancelled today.Pt.spoke minimaly about her disappointment and saddness.) Interacted appropriately  (Pt.cooperative and following the guided breathing relaxation cues appropriately.Continues to display less eye contact today.)   Affect/Mood Constricted Appropriate;Constricted   Goals Achieved Able to listen to others;Identified feelings Able to listen to others;Able to engage in interactions;Discussed coping strategies

## 2024-01-12 NOTE — PLAN OF CARE
Problem: Ineffective Coping  Goal: Identifies ineffective coping skills  Outcome: Progressing  Goal: Identifies healthy coping skills  Outcome: Progressing  Goal: Demonstrates healthy coping skills  Outcome: Progressing  Goal: Participates in unit activities  Description: Interventions:  - Provide therapeutic environment   - Provide required programming   - Redirect inappropriate behaviors   Outcome: Progressing  Goal: Patient/Family participate in treatment and DC plans  Description: Interventions:  - Provide therapeutic environment  Outcome: Progressing  Goal: Patient/Family verbalizes awareness of resources  Outcome: Progressing  Goal: Understands least restrictive measures  Description: Interventions:  - Utilize least restrictive behavior  Outcome: Progressing  Goal: Free from restraint events  Description: - Utilize least restrictive measures   - Provide behavioral interventions   - Redirect inappropriate behaviors   Outcome: Progressing     Problem: Risk for Self Injury/Neglect  Goal: Treatment Goal: Remain safe during length of stay, learn and adopt new coping skills, and be free of self-injurious ideation, impulses and acts at the time of discharge  Outcome: Progressing  Goal: Verbalize thoughts and feelings  Description: Interventions:  - Assess and re-assess patient's lethality and potential for self-injury  - Engage patient in 1:1 interactions, daily, for a minimum of 15 minutes  - Encourage patient to express feelings, fears, frustrations, hopes  - Establish rapport/trust with patient   Outcome: Progressing  Goal: Refrain from harming self  Description: Interventions:  - Monitor patient closely, per order  - Develop a trusting relationship  - Supervise medication ingestion, monitor effects and side effects   Outcome: Progressing  Goal: Attend and participate in unit activities, including therapeutic, recreational, and educational groups  Description: Interventions:  - Provide therapeutic and  educational activities daily, encourage attendance and participation, and document same in the medical record  - Obtain collateral information, encourage visitation and family involvement in care   Outcome: Progressing  Goal: Recognize maladaptive responses and adopt new coping mechanisms  Outcome: Progressing  Goal: Complete daily ADLs, including personal hygiene independently, as able  Description: Interventions:  - Observe, teach, and assist patient with ADLS  - Monitor and promote a balance of rest/activity, with adequate nutrition and elimination  Outcome: Progressing     Problem: DISCHARGE PLANNING - CARE MANAGEMENT  Goal: Discharge to post-acute care or home with appropriate resources  Description: INTERVENTIONS:  - Conduct assessment to determine patient/family and health care team treatment goals, and need for post-acute services based on payer coverage, community resources, and patient preferences, and barriers to discharge  - Address psychosocial, clinical, and financial barriers to discharge as identified in assessment in conjunction with the patient/family and health care team  - Arrange appropriate level of post-acute services according to patient’s   needs and preference and payer coverage in collaboration with the physician and health care team  - Communicate with and update the patient/family, physician, and health care team regarding progress on the discharge plan  - Arrange appropriate transportation to post-acute venues  Outcome: Progressing     Problem: SAFETY, RESTRAINT - VIOLENT/SELF-DESTRUCTIVE  Goal: Remains free of harm/injury from restraints (Restraint for Violent/Self-Destructive Behavior)  Description: INTERVENTIONS:  - Instruct patient/family regarding restraint use   - Assess and monitor physiologic and psychological status   - Provide interventions and comfort measures to meet assessed patient needs   - Ensure continuous in person monitoring is provided   - Identify and implement  measures to help patient regain control  - Assess readiness for release of restraint  Outcome: Progressing  Goal: Returns to optimal restraint-free functioning  Description: INTERVENTIONS:  - Assess the patient's behavior and symptoms that indicate continued need for restraint  - Identify and implement measures to help patient regain control  - Assess readiness for release of restraint   Outcome: Progressing

## 2024-01-12 NOTE — NURSING NOTE
The patient was mostly withdrawn to room this evening. She is calm and cooperative with staff/peers. Patient denies depression, but endorses some anxiety. Denies SI/HI/AVH. Patient was med compliant. She is looking forward to [facility] meeting today. Safety checks going.

## 2024-01-12 NOTE — CASE MANAGEMENT
The patient's [facility] Meeting via [facility] to discuss the [facility] has been postponed until Tuesday 1/16/24 @ 105. Patient is aware.

## 2024-01-13 PROCEDURE — 99232 SBSQ HOSP IP/OBS MODERATE 35: CPT | Performed by: STUDENT IN AN ORGANIZED HEALTH CARE EDUCATION/TRAINING PROGRAM

## 2024-01-13 RX ADMIN — CLONIDINE HYDROCHLORIDE 0.2 MG: 0.1 TABLET ORAL at 20:01

## 2024-01-13 RX ADMIN — Medication 250 MG: at 08:09

## 2024-01-13 RX ADMIN — MELATONIN TAB 3 MG 3 MG: 3 TAB at 20:02

## 2024-01-13 RX ADMIN — OXYBUTYNIN CHLORIDE 5 MG: 5 TABLET ORAL at 08:09

## 2024-01-13 RX ADMIN — OXCARBAZEPINE 450 MG: 150 TABLET, FILM COATED ORAL at 20:01

## 2024-01-13 RX ADMIN — OXYBUTYNIN CHLORIDE 5 MG: 5 TABLET ORAL at 18:01

## 2024-01-13 RX ADMIN — TOPIRAMATE 200 MG: 100 TABLET, FILM COATED ORAL at 17:54

## 2024-01-13 RX ADMIN — OXCARBAZEPINE 450 MG: 150 TABLET, FILM COATED ORAL at 08:05

## 2024-01-13 RX ADMIN — WARFARIN SODIUM 6 MG: 2 TABLET ORAL at 17:54

## 2024-01-13 RX ADMIN — TOPIRAMATE 200 MG: 100 TABLET, FILM COATED ORAL at 08:06

## 2024-01-13 RX ADMIN — CYANOCOBALAMIN TAB 1000 MCG 1000 MCG: 1000 TAB at 08:06

## 2024-01-13 RX ADMIN — ATOMOXETINE 40 MG: 40 CAPSULE ORAL at 08:09

## 2024-01-13 RX ADMIN — PANTOPRAZOLE SODIUM 40 MG: 40 TABLET, DELAYED RELEASE ORAL at 06:18

## 2024-01-13 RX ADMIN — Medication 250 MG: at 17:54

## 2024-01-13 RX ADMIN — NALTREXONE HYDROCHLORIDE 50 MG: 50 TABLET, FILM COATED ORAL at 08:06

## 2024-01-13 RX ADMIN — BUPROPION HYDROCHLORIDE 450 MG: 300 TABLET, EXTENDED RELEASE ORAL at 08:05

## 2024-01-13 RX ADMIN — LEVOTHYROXINE SODIUM 125 MCG: 125 TABLET ORAL at 06:18

## 2024-01-13 NOTE — PLAN OF CARE
Problem: Ineffective Coping  Goal: Identifies ineffective coping skills  Outcome: Progressing  Goal: Demonstrates healthy coping skills  Outcome: Progressing     Problem: Risk for Self Injury/Neglect  Goal: Verbalize thoughts and feelings  Description: Interventions:  - Assess and re-assess patient's lethality and potential for self-injury  - Engage patient in 1:1 interactions, daily, for a minimum of 15 minutes  - Encourage patient to express feelings, fears, frustrations, hopes  - Establish rapport/trust with patient   Outcome: Progressing  Goal: Refrain from harming self  Description: Interventions:  - Monitor patient closely, per order  - Develop a trusting relationship  - Supervise medication ingestion, monitor effects and side effects   Outcome: Progressing     Problem: Depression  Goal: Refrain from isolation  Description: Interventions:  - Develop a trusting relationship   - Encourage socialization   Outcome: Progressing  Goal: Refrain from self-neglect  Outcome: Progressing  Goal: Complete daily ADLs, including personal hygiene independently, as able  Description: Interventions:  - Observe, teach, and assist patient with ADLS  -  Monitor and promote a balance of rest/activity, with adequate nutrition and elimination   Outcome: Progressing     Problem: Anxiety  Goal: Anxiety is at manageable level  Description: Interventions:  - Assess and monitor patient's anxiety level.   - Monitor for signs and symptoms (heart palpitations, chest pain, shortness of breath, headaches, nausea, feeling jumpy, restlessness, irritable, apprehensive).   - Collaborate with interdisciplinary team and initiate plan and interventions as ordered.  - [place] patient to unit/surroundings  - Explain treatment plan  - Encourage participation in care  - Encourage verbalization of concerns/fears  - Identify coping mechanisms  - Assist in developing anxiety-reducing skills  - Administer/offer alternative therapies  - Limit or eliminate  stimulants  Outcome: Not Progressing

## 2024-01-13 NOTE — PROGRESS NOTES
Progress Note - Behavioral Health   Blanca Mason 53 y.o. female MRN: [id number]  Unit/Bed#: [facility] 651-02 Encounter: [id number]       Patient was visited on unit for continuing care; chart reviewed and discussed with the nursing staff. On approach, the patient was calm and superficially cooperative. Continues to have poor frustration while her needs not met immediately, with labile affect.  She was sitting in her room reading a book and when was asked to join the peers in the art group, she noted that she does not like the group therapist as reportedly she asked her to not sitting close to others last week when she was on COVID precautions.  She became tearful while was talking about her cat being left home alone and noted that her cat bit his father's hand and he is not able to take care of her cat. Denied any changes in mood, appetite, and energy level. No problem initiating and maintaining sleep. Denied A/VH currently. Denied SI/HI, intent or plan upon direct inquiry at this time.    Patient continues to be intermittently visible in the milieu and interacts with select staff and peers. No reports of aggression or self-injurious behavior on unit. No PRN medications used in the past 24 hours.    Patient accepted all offered medications and reported feeling better. No adverse effects of medications noted or reported. SNOWDEN Invega Sustenna 234 mg IM due on 2/4/24. The patient has to remain in the hospital while awaiting placement as their mental illness does not allow for them to be treated at a lower level of care. Case management is assertively/actively working on securing the necessary level of care; pending EAC.      Current Mental Status Evaluation:  Appearance and attitude: appeared as stated age, dressed in hospital attire, wearing eyeglasses, with good hygiene  Eye contact: fair  Motor Function: within normal limits, No PMA/PMR  Gait/station: Not observed  Speech: talking in soft tone with normal latency  "and decreased amount  Language: No overt abnormality  Mood/affect:  \"good\"; appeared labile and slightly dysphoric  / Affect was constricted, tearful, mood-congruent  Thought Processes: concrete, ruminating  Thought content: denied suicidal ideations or homicidal ideations, mild paranoia, ruminating thoughts  Associations: concrete associations  Perceptual disturbances: denies Auditory/Visual/Tactile Hallucinations  Orientation: oriented to time, person, place and to the situational context  Cognitive Function: intact  Memory: recent and remote memory grossly intact  Intellect: unable to assess  Fund of knowledge: aware of current events, aware of past history, and vocabulary average  Impulse control: good  Insight/judgment: limited/limited    Vital signs in last 24 hours:    Temp:  [96.8 °F (36 °C)-97.6 °F (36.4 °C)] 96.8 °F (36 °C)  HR:  [70-84] 70  Resp:  [16] 16  BP: (106-123)/(56-81) 106/56    Laboratory results: I have personally reviewed all pertinent laboratory/tests results    Results from the past 24 hours: No results found for this or any previous visit (from the past 24 hour(s)).    Progress Toward Goals: mood is stabilizing, discharge planning, placement pending    Assessment:  Principal Problem:    Schizoaffective disorder, bipolar type (HCC)  Active Problems:    Borderline personality disorder (HCC)    Benign essential hypertension    Edema    Hypothyroidism    MAG (obstructive sleep apnea)    Overactive bladder    Sjogren's syndrome (HCC)    Medical clearance for psychiatric admission    Anxiety    History of pulmonary embolism    Ingrown toenail        Plan:  - f/u SLIM recs regarding the medical problems  - Continue SNOWDEN Invega Sustenna 234 mg IM (next dose due on 2/4/24)  - Continue medication titration and treatment plan; adjust medication to optimize treatment response and as clinically indicated.     Scheduled medications:  Current Facility-Administered Medications   Medication Dose Route " Frequency Provider Last Rate    aluminum-magnesium hydroxide-simethicone  30 mL Oral Q4H PRN Wendy Anderson, CRNP      atoMOXetine  40 mg Oral Daily Anatoly Prajapati, DO      buPROPion  450 mg Oral Daily Anatoly Prajapati, DO      cloNIDine  0.2 mg Oral Q12H [facility] Virginia Mccullough, CRNP      cyanocobalamin  1,000 mcg Oral Daily Dianna Mcrae, CRNP      cyanocobalamin  1,000 mcg Intramuscular Q30 Days Dianna Mcrae, CRNP      ergocalciferol  50,000 Units Oral Weekly Dianna Mcrae, CRNP      furosemide  20 mg Oral Daily Wendy Anderson, CRNP      hydrocortisone   Topical 4x Daily PRN Dianna Mcrae, CRNP      hydrOXYzine HCL  25 mg Oral Q6H PRN Max 4/day Virginia Mccullough, CRNP      hydrOXYzine HCL  50 mg Oral Q6H PRN Max 4/day Virginia Mccullough, CRNP      ibuprofen  200 mg Oral Q6H PRN Virginia Mccullough, CRNP      ibuprofen  400 mg Oral Q6H PRN Virginia Mccullough, CRNP      ibuprofen  600 mg Oral Q8H PRN Virginia Mccullough, CRNP      levothyroxine  125 mcg Oral Early Morning Wendy Anderson, CRNP      loperamide  2 mg Oral Q4H PRN Anatoly Prajapati, DO      LORazepam  1 mg Intramuscular Q6H PRN Max 3/day Virginia Mccullough, CRNP      LORazepam  1 mg Oral Q6H PRN Max 3/day Virginia Mccullough, CRNP      losartan  100 mg Oral Daily Wendy Anderson, CRNP      melatonin  3 mg Oral HS Frederick Smith MD      naltrexone  50 mg Oral Daily Anatoly Prajapati, DO      OLANZapine  10 mg Oral Q6H PRN Frederick Smith MD      Or    OLANZapine  10 mg Intramuscular Q6H PRN Frederick Smith MD      OLANZapine  5 mg Oral Q6H PRN Frederick Smith MD      Or    OLANZapine  5 mg Intramuscular Q6H PRN Frederick Smith MD      OLANZapine  2.5 mg Oral Q3H PRN Frederick Smith MD      OXcarbazepine  450 mg Oral Q12H [facility] Frederick Smiht MD      oxybutynin  5 mg Oral BID Wendy Anderson, CRNP      paliperidone  234 mg IM- Deltoid Q4 weeks JHONATAN Anne      pantoprazole  40 mg Oral Early Morning Wendy Rodriguez  JHONATAN Anderson      polyethylene glycol  17 g Oral Daily PRN JHONATAN Anne      saccharomyces boulardii  250 mg Oral BID Anatoly Prajapati, DO      senna-docusate sodium  1 tablet Oral Daily PRN JHONATAN Anne      topiramate  200 mg Oral BID Anatoly Prajapati, DO      traZODone  50 mg Oral HS PRN JHONATAN Anne      warfarin  6 mg Oral Daily (warfarin) Wendy JHONATAN Yin          PRN:    aluminum-magnesium hydroxide-simethicone    hydrocortisone    hydrOXYzine HCL    hydrOXYzine HCL    ibuprofen    ibuprofen    ibuprofen    loperamide    LORazepam    LORazepam    OLANZapine **OR** OLANZapine    OLANZapine **OR** OLANZapine    OLANZapine    polyethylene glycol    senna-docusate sodium    traZODone    - Observation: routine    - VS: as per unit protocol  - Diet: Regular diet  - Psychoeducation (benefits and potential risks) discussed, importance of compliance with the psychiatric treatment reiterated, and the patient verbalized understanding of the matter  - Encourage group attendance and milieu therapy    - The pt was educated and agreed to verbalize any suicidal thoughts, frustrations or concerns to the nursing staff, immediately.    - Dispo: EAC       Next of Kin  Extended Emergency Contact Information  Primary Emergency Contact: FerminspeedyLuigi  Address: [address], [place] [address] [place] [address] of [place]  Home Phone: [phone]  Mobile Phone: [phone]  Relation: Father  Secondary Emergency Contact: Leonor Ji  Address: [address] [place] [address] [facility] [place]  Home Phone: [phone]  Mobile Phone: [phone]  Relation: Friend      Counseling / Coordination of Care    Patient's progress reviewed with nursing staff.  Medications, treatment progress and treatment plan reviewed with patient.  Medication education provided to patient.  Coping with prolonged hospitalization and waiting for placement reviewed with  patient.  Coping skills reviewed with patient.  Reassurance and supportive therapy provided.  Reoriented to reality and reassured.  Encouraged participation in milieu and group therapy on the unit.     Frederick Smith MD  Attending Psychiatrist   [facility]

## 2024-01-13 NOTE — NURSING NOTE
Pt is visible on unit, social with peers and staff. Pt is calm and cooperative with care, Pt is currently denying all psych s/s, meal and medication complaint, safety checks on going.

## 2024-01-14 PROCEDURE — 99232 SBSQ HOSP IP/OBS MODERATE 35: CPT | Performed by: STUDENT IN AN ORGANIZED HEALTH CARE EDUCATION/TRAINING PROGRAM

## 2024-01-14 RX ADMIN — Medication 250 MG: at 08:05

## 2024-01-14 RX ADMIN — NALTREXONE HYDROCHLORIDE 50 MG: 50 TABLET, FILM COATED ORAL at 08:05

## 2024-01-14 RX ADMIN — CLONIDINE HYDROCHLORIDE 0.2 MG: 0.1 TABLET ORAL at 21:30

## 2024-01-14 RX ADMIN — OXYBUTYNIN CHLORIDE 5 MG: 5 TABLET ORAL at 08:09

## 2024-01-14 RX ADMIN — OXYBUTYNIN CHLORIDE 5 MG: 5 TABLET ORAL at 17:21

## 2024-01-14 RX ADMIN — OXCARBAZEPINE 450 MG: 150 TABLET, FILM COATED ORAL at 21:30

## 2024-01-14 RX ADMIN — TOPIRAMATE 200 MG: 100 TABLET, FILM COATED ORAL at 17:15

## 2024-01-14 RX ADMIN — MELATONIN TAB 3 MG 3 MG: 3 TAB at 21:30

## 2024-01-14 RX ADMIN — OXCARBAZEPINE 450 MG: 150 TABLET, FILM COATED ORAL at 08:05

## 2024-01-14 RX ADMIN — WARFARIN SODIUM 6 MG: 2 TABLET ORAL at 17:15

## 2024-01-14 RX ADMIN — Medication 250 MG: at 17:15

## 2024-01-14 RX ADMIN — CLONIDINE HYDROCHLORIDE 0.2 MG: 0.1 TABLET ORAL at 08:05

## 2024-01-14 RX ADMIN — BUPROPION HYDROCHLORIDE 450 MG: 300 TABLET, EXTENDED RELEASE ORAL at 08:05

## 2024-01-14 RX ADMIN — FUROSEMIDE 20 MG: 20 TABLET ORAL at 08:05

## 2024-01-14 RX ADMIN — LEVOTHYROXINE SODIUM 125 MCG: 125 TABLET ORAL at 06:07

## 2024-01-14 RX ADMIN — CYANOCOBALAMIN TAB 1000 MCG 1000 MCG: 1000 TAB at 08:05

## 2024-01-14 RX ADMIN — TRAZODONE HYDROCHLORIDE 50 MG: 50 TABLET ORAL at 21:33

## 2024-01-14 RX ADMIN — TOPIRAMATE 200 MG: 100 TABLET, FILM COATED ORAL at 08:05

## 2024-01-14 RX ADMIN — PANTOPRAZOLE SODIUM 40 MG: 40 TABLET, DELAYED RELEASE ORAL at 06:07

## 2024-01-14 RX ADMIN — LOSARTAN POTASSIUM 100 MG: 50 TABLET, FILM COATED ORAL at 08:05

## 2024-01-14 RX ADMIN — ATOMOXETINE 40 MG: 40 CAPSULE ORAL at 08:04

## 2024-01-14 NOTE — PLAN OF CARE
Problem: Risk for Self Injury/Neglect  Goal: Treatment Goal: Remain safe during length of stay, learn and adopt new coping skills, and be free of self-injurious ideation, impulses and acts at the time of discharge  Outcome: Progressing  Goal: Verbalize thoughts and feelings  Description: Interventions:  - Assess and re-assess patient's lethality and potential for self-injury  - Engage patient in 1:1 interactions, daily, for a minimum of 15 minutes  - Encourage patient to express feelings, fears, frustrations, hopes  - Establish rapport/trust with patient   Outcome: Progressing  Goal: Refrain from harming self  Description: Interventions:  - Monitor patient closely, per order  - Develop a trusting relationship  - Supervise medication ingestion, monitor effects and side effects   Outcome: Progressing  Goal: Attend and participate in unit activities, including therapeutic, recreational, and educational groups  Description: Interventions:  - Provide therapeutic and educational activities daily, encourage attendance and participation, and document same in the medical record  - Obtain collateral information, encourage visitation and family involvement in care   Outcome: Progressing  Goal: Recognize maladaptive responses and adopt new coping mechanisms  Outcome: Progressing  Goal: Complete daily ADLs, including personal hygiene independently, as able  Description: Interventions:  - Observe, teach, and assist patient with ADLS  - Monitor and promote a balance of rest/activity, with adequate nutrition and elimination  Outcome: Progressing     Problem: Depression  Goal: Treatment Goal: Demonstrate behavioral control of depressive symptoms, verbalize feelings of improved mood/affect, and adopt new coping skills prior to discharge  Outcome: Progressing  Goal: Verbalize thoughts and feelings  Description: Interventions:  - Assess and re-assess patient's level of risk   - Engage patient in 1:1 interactions, daily, for a minimum  of 15 minutes   - Encourage patient to express feelings, fears, frustrations, hopes   Outcome: Progressing  Goal: Refrain from harming self  Description: Interventions:  - Monitor patient closely, per order   - Supervise medication ingestion, monitor effects and side effects   Outcome: Progressing  Goal: Refrain from isolation  Description: Interventions:  - Develop a trusting relationship   - Encourage socialization   Outcome: Progressing  Goal: Refrain from self-neglect  Outcome: Progressing  Goal: Attend and participate in unit activities, including therapeutic, recreational, and educational groups  Description: Interventions:  - Provide therapeutic and educational activities daily, encourage attendance and participation, and document same in the medical record   Outcome: Progressing  Goal: Complete daily ADLs, including personal hygiene independently, as able  Description: Interventions:  - Observe, teach, and assist patient with ADLS  -  Monitor and promote a balance of rest/activity, with adequate nutrition and elimination   Outcome: Progressing     Problem: Anxiety  Goal: Anxiety is at manageable level  Description: Interventions:  - Assess and monitor patient's anxiety level.   - Monitor for signs and symptoms (heart palpitations, chest pain, shortness of breath, headaches, nausea, feeling jumpy, restlessness, irritable, apprehensive).   - Collaborate with interdisciplinary team and initiate plan and interventions as ordered.  - [place] patient to unit/surroundings  - Explain treatment plan  - Encourage participation in care  - Encourage verbalization of concerns/fears  - Identify coping mechanisms  - Assist in developing anxiety-reducing skills  - Administer/offer alternative therapies  - Limit or eliminate stimulants  Outcome: Progressing     Problem: Risk for Violence/Aggression Toward Others  Goal: Treatment Goal: Refrain from acts of violence/aggression during length of stay, and demonstrate improved  impulse control at the time of discharge  Outcome: Progressing  Goal: Verbalize thoughts and feelings  Description: Interventions:  - Assess and re-assess patient's level of risk, every waking shift  - Engage patient in 1:1 interactions, daily, for a minimum of 15 minutes   - Allow patient to express feelings and frustrations in a safe and non-threatening manner   - Establish rapport/trust with patient   Outcome: Progressing  Goal: Refrain from harming others  Outcome: Progressing  Goal: Refrain from destructive acts on the environment or property  Outcome: Progressing  Goal: Control angry outbursts  Description: Interventions:  - Monitor patient closely, per order  - Ensure early verbal de-escalation  - Monitor prn medication needs  - Set reasonable/therapeutic limits, outline behavioral expectations, and consequences   - Provide a non-threatening milieu, utilizing the least restrictive interventions   Outcome: Progressing  Goal: Attend and participate in unit activities, including therapeutic, recreational, and educational groups  Description: Interventions:  - Provide therapeutic and educational activities daily, encourage attendance and participation, and document same in the medical record   Outcome: Progressing  Goal: Identify appropriate positive anger management techniques  Description: Interventions:  - Offer anger management and coping skills groups   - Staff will provide positive feedback for appropriate anger control  Outcome: Progressing     Problem: DISCHARGE PLANNING - CARE MANAGEMENT  Goal: Discharge to post-acute care or home with appropriate resources  Description: INTERVENTIONS:  - Conduct assessment to determine patient/family and health care team treatment goals, and need for post-acute services based on payer coverage, community resources, and patient preferences, and barriers to discharge  - Address psychosocial, clinical, and financial barriers to discharge as identified in assessment in  conjunction with the patient/family and health care team  - Arrange appropriate level of post-acute services according to patient’s   needs and preference and payer coverage in collaboration with the physician and health care team  - Communicate with and update the patient/family, physician, and health care team regarding progress on the discharge plan  - Arrange appropriate transportation to post-acute venues  Outcome: Progressing     Problem: SAFETY, RESTRAINT - VIOLENT/SELF-DESTRUCTIVE  Goal: Remains free of harm/injury from restraints (Restraint for Violent/Self-Destructive Behavior)  Description: INTERVENTIONS:  - Instruct patient/family regarding restraint use   - Assess and monitor physiologic and psychological status   - Provide interventions and comfort measures to meet assessed patient needs   - Ensure continuous in person monitoring is provided   - Identify and implement measures to help patient regain control  - Assess readiness for release of restraint  Outcome: Progressing  Goal: Returns to optimal restraint-free functioning  Description: INTERVENTIONS:  - Assess the patient's behavior and symptoms that indicate continued need for restraint  - Identify and implement measures to help patient regain control  - Assess readiness for release of restraint   Outcome: Progressing

## 2024-01-14 NOTE — PROGRESS NOTES
Progress Note - Behavioral Health   Blanca Mason 53 y.o. female MRN: [id number]  Unit/Bed#: [facility] 651-02 Encounter: [id number]       Patient was visited on unit for continuing care; chart reviewed and discussed with the nursing staff. On approach, the patient was calm and superficially cooperative, preoccupied with her cat and going home and continues to become tearful intermittently during the evaluation.  She noted that she is concerned about her upcoming [facility] meeting on Tuesday and does not want to stay in the hospital for a long time, thinks that she has better coping skills and would like to continue follow-up with her ACT team, considering anger management, DBT and would attend the co-occurring groups.  She noted that her brother has been more understanding and supporting and has been helping his father taking care of her cat.  Denied any changes in appetite, and energy level. No problem initiating and maintaining sleep. Denied A/VH currently. Denied SI/HI, intent or plan upon direct inquiry at this time.    Patient continues to be selectively interactive with staff and peers. No reports of aggression or self-injurious behavior on unit. No PRN medications used in the past 24 hours.    Patient accepted all offered medications and reported feeling better. No adverse effects of medications noted or reported. SNOWDEN Invega Sustenna 234 mg IM due on 2/4/24. The patient has to remain in the hospital while awaiting placement as their mental illness does not allow for them to be treated at a lower level of care. Case management is assertively/actively working on securing the necessary level of care; pending EAC.      Current Mental Status Evaluation:  Appearance and attitude: appeared as stated age, dressed in hospital attire, with good hygiene  Eye contact: good  Motor Function: within normal limits, intact gait, No PMA/PMR  Gait/station: normal gait/station and normal balance  Speech: normal for rate, rhythm,  volume, latency, amount  Language: No overt abnormality  Mood/affect: labile / Affect was constricted but reactive, mood congruent, tearful  Thought Processes: concrete, ruminating  Thought content: denied suicidal ideations or homicidal ideations, ruminating thoughts, preoccupied with her cat and going home  Associations: concrete associations, perseverative  Perceptual disturbances: denies Auditory/Visual/Tactile Hallucinations  Orientation: oriented to time, person, place and to the situational context  Cognitive Function: intact  Memory: recent and remote memory grossly intact  Intellect: unable to assess  Fund of knowledge: aware of current events, aware of past history, and vocabulary average  Impulse control: good  Insight/judgment: limited/limited      Vital signs in last 24 hours:    Temp:  [97.3 °F (36.3 °C)-97.9 °F (36.6 °C)] 97.6 °F (36.4 °C)  HR:  [70-96] 70  Resp:  [16-17] 16  BP: (119-143)/(58-83) 143/83    Laboratory results: I have personally reviewed all pertinent laboratory/tests results    Results from the past 24 hours: No results found for this or any previous visit (from the past 24 hour(s)).    Progress Toward Goals: placement pending    Assessment:  Principal Problem:    Schizoaffective disorder, bipolar type (HCC)  Active Problems:    Borderline personality disorder (HCC)    Benign essential hypertension    Edema    Hypothyroidism    MAG (obstructive sleep apnea)    Overactive bladder    Sjogren's syndrome (HCC)    Medical clearance for psychiatric admission    Anxiety    History of pulmonary embolism    Ingrown toenail        Plan:  - f/u SLIM recs regarding the medical problems  - Continue SNOWDEN Invega Sustenna 234 mg IM (next dose due on 2/4/24)  - Continue medication titration and treatment plan; adjust medication to optimize treatment response and as clinically indicated.     Scheduled medications:  Current Facility-Administered Medications   Medication Dose Route Frequency Provider Last  Rate    aluminum-magnesium hydroxide-simethicone  30 mL Oral Q4H PRN Wendy Anderson, CRNP      atoMOXetine  40 mg Oral Daily Anatoly Prajapati, DO      buPROPion  450 mg Oral Daily Anatoly Prajapati, DO      cloNIDine  0.2 mg Oral Q12H [facility] Virginia Mccullough, CRNP      cyanocobalamin  1,000 mcg Oral Daily Dianna Mcrae, CRNP      cyanocobalamin  1,000 mcg Intramuscular Q30 Days Dianna Mcrae, CRNP      ergocalciferol  50,000 Units Oral Weekly Dianna Mcrae, CRNP      furosemide  20 mg Oral Daily Wendy Anderson, CRNP      hydrocortisone   Topical 4x Daily PRN Dianna Mcrae, CRNP      hydrOXYzine HCL  25 mg Oral Q6H PRN Max 4/day Virginia Mccullough, CRNP      hydrOXYzine HCL  50 mg Oral Q6H PRN Max 4/day Virginia Mccullough, CRNP      ibuprofen  200 mg Oral Q6H PRN Virginia Mccullough, CRNP      ibuprofen  400 mg Oral Q6H PRN Virginia Mccullough, CRNP      ibuprofen  600 mg Oral Q8H PRN Virginia Mccullough, CRNP      levothyroxine  125 mcg Oral Early Morning Wendy Anderson, CRNP      loperamide  2 mg Oral Q4H PRN Anatoly Prajapati, DO      LORazepam  1 mg Intramuscular Q6H PRN Max 3/day Virginia Mccullough, CRNP      LORazepam  1 mg Oral Q6H PRN Max 3/day Virginia Mccullough, CRNP      losartan  100 mg Oral Daily Wendy Anderson, CRNP      melatonin  3 mg Oral HS Frederick Smith MD      naltrexone  50 mg Oral Daily Anatoly Prajapati, DO      OLANZapine  10 mg Oral Q6H PRN Frederick Smith MD      Or    OLANZapine  10 mg Intramuscular Q6H PRN Frederick Smith MD      OLANZapine  5 mg Oral Q6H PRN Frederick Smith MD      Or    OLANZapine  5 mg Intramuscular Q6H PRN Frederick Smith MD      OLANZapine  2.5 mg Oral Q3H PRN Frederick Smith MD      OXcarbazepine  450 mg Oral Q12H [facility] Frederick Smith MD      oxybutynin  5 mg Oral BID Wendy Anderson, CRNP      paliperidone  234 mg IM- Deltoid Q4 weeks JHONATAN Anne      pantoprazole  40 mg Oral Early Morning JHONATAN Fields       polyethylene glycol  17 g Oral Daily PRN JHONATAN Anne      saccharomyces boulardii  250 mg Oral BID Anatoly Prajapati, DO      senna-docusate sodium  1 tablet Oral Daily PRN JHONATAN Anne      topiramate  200 mg Oral BID Anatoly Prajapati, DO      traZODone  50 mg Oral HS PRN JHONATAN Anne      warfarin  6 mg Oral Daily (warfarin) JHONATAN Fields          PRN:    aluminum-magnesium hydroxide-simethicone    hydrocortisone    hydrOXYzine HCL    hydrOXYzine HCL    ibuprofen    ibuprofen    ibuprofen    loperamide    LORazepam    LORazepam    OLANZapine **OR** OLANZapine    OLANZapine **OR** OLANZapine    OLANZapine    polyethylene glycol    senna-docusate sodium    traZODone    - Observation: routine    - VS: as per unit protocol  - Diet: Regular diet  - Psychoeducation (benefits and potential risks) discussed, importance of compliance with the psychiatric treatment reiterated, and the patient verbalized understanding of the matter  - Encourage group attendance and milieu therapy    - The pt was educated and agreed to verbalize any suicidal thoughts, frustrations or concerns to the nursing staff, immediately.    - Dispo: EAC      Next of Kin  Extended Emergency Contact Information  Primary Emergency Contact: FerminspeedyLuigi  Address: [address] [place], [place] [address] [place] [address]  Home Phone: [phone]  Mobile Phone: [phone]  Relation: Father  Secondary Emergency Contact: Leonor Ji  Address: [address] [place] [address] [facility]  Home Phone: [phone]  Mobile Phone: [phone]  Relation: Friend      Counseling / Coordination of Care    Patient's progress reviewed with nursing staff.  Medications, treatment progress and treatment plan reviewed with patient.  Medication education provided to patient.  Coping with prolonged hospitalization and waiting for placement reviewed with patient.  Coping  techniques reviewed with patient.  Reassurance and supportive therapy provided.  Reoriented to reality and reassured.  Encouraged participation in milieu and group therapy on the unit.     Frederick Smith MD  Attending Psychiatrist   [facility]

## 2024-01-14 NOTE — NURSING NOTE
Pt calm and pleasant brightens on approach. Pt visible in milieu, social with select peers and staff. Pt denies SI and HI. Pt cooperative with care and med compliant. Q7 minute safety checks ongoing.

## 2024-01-15 PROCEDURE — 99232 SBSQ HOSP IP/OBS MODERATE 35: CPT | Performed by: STUDENT IN AN ORGANIZED HEALTH CARE EDUCATION/TRAINING PROGRAM

## 2024-01-15 RX ADMIN — Medication 250 MG: at 08:23

## 2024-01-15 RX ADMIN — OXCARBAZEPINE 450 MG: 150 TABLET, FILM COATED ORAL at 08:25

## 2024-01-15 RX ADMIN — MELATONIN TAB 3 MG 3 MG: 3 TAB at 21:30

## 2024-01-15 RX ADMIN — CYANOCOBALAMIN TAB 1000 MCG 1000 MCG: 1000 TAB at 08:25

## 2024-01-15 RX ADMIN — WARFARIN SODIUM 6 MG: 2 TABLET ORAL at 17:03

## 2024-01-15 RX ADMIN — TOPIRAMATE 200 MG: 100 TABLET, FILM COATED ORAL at 17:03

## 2024-01-15 RX ADMIN — OXYBUTYNIN CHLORIDE 5 MG: 5 TABLET ORAL at 08:29

## 2024-01-15 RX ADMIN — PANTOPRAZOLE SODIUM 40 MG: 40 TABLET, DELAYED RELEASE ORAL at 05:47

## 2024-01-15 RX ADMIN — ATOMOXETINE 40 MG: 40 CAPSULE ORAL at 08:32

## 2024-01-15 RX ADMIN — Medication 250 MG: at 17:03

## 2024-01-15 RX ADMIN — BUPROPION HYDROCHLORIDE 450 MG: 300 TABLET, EXTENDED RELEASE ORAL at 08:24

## 2024-01-15 RX ADMIN — OXYBUTYNIN CHLORIDE 5 MG: 5 TABLET ORAL at 17:19

## 2024-01-15 RX ADMIN — ERGOCALCIFEROL 50000 UNITS: 1.25 CAPSULE ORAL at 08:25

## 2024-01-15 RX ADMIN — CLONIDINE HYDROCHLORIDE 0.2 MG: 0.1 TABLET ORAL at 21:30

## 2024-01-15 RX ADMIN — HYDROXYZINE HYDROCHLORIDE 50 MG: 50 TABLET, FILM COATED ORAL at 10:26

## 2024-01-15 RX ADMIN — OXCARBAZEPINE 450 MG: 150 TABLET, FILM COATED ORAL at 21:30

## 2024-01-15 RX ADMIN — NALTREXONE HYDROCHLORIDE 50 MG: 50 TABLET, FILM COATED ORAL at 08:24

## 2024-01-15 RX ADMIN — TOPIRAMATE 200 MG: 100 TABLET, FILM COATED ORAL at 08:25

## 2024-01-15 RX ADMIN — LEVOTHYROXINE SODIUM 125 MCG: 125 TABLET ORAL at 05:47

## 2024-01-15 NOTE — PROGRESS NOTES
01/15/24 0817   Team Meeting   Meeting Type Daily Rounds   Initial Conference Date 01/15/24   Next Conference Date 01/16/24   Team Members Present   Team Members Present Physician;Nurse;;;Occupational Therapist   Physician Team Member Dr Smith, SHEFALI Lovell   Nursing Team Member Aram   Care Management Team Member Atiya   Social Work Team Member Dalia TALBOT Team Member Yeison   Patient/Family Present   Patient Present No   Patient's Family Present No     [facility] meeting on Tuesday (cancelled on last Friday), deny s/s, medication compliant, social with select peers and staff, upset regarding cat and father not being able to care for the cat, discharge pending.

## 2024-01-15 NOTE — PLAN OF CARE
Pt did not attend groups when prompted or offered.    Problem: Risk for Self Injury/Neglect  Goal: Attend and participate in unit activities, including therapeutic, recreational, and educational groups  Description: Interventions:  - Provide therapeutic and educational activities daily, encourage attendance and participation, and document same in the medical record  - Obtain collateral information, encourage visitation and family involvement in care   Outcome: Not Progressing

## 2024-01-15 NOTE — PLAN OF CARE
Problem: Depression  Goal: Treatment Goal: Demonstrate behavioral control of depressive symptoms, verbalize feelings of improved mood/affect, and adopt new coping skills prior to discharge  Outcome: Progressing  Goal: Verbalize thoughts and feelings  Description: Interventions:  - Assess and re-assess patient's level of risk   - Engage patient in 1:1 interactions, daily, for a minimum of 15 minutes   - Encourage patient to express feelings, fears, frustrations, hopes   Outcome: Progressing  Goal: Refrain from harming self  Description: Interventions:  - Monitor patient closely, per order   - Supervise medication ingestion, monitor effects and side effects   Outcome: Progressing  Goal: Refrain from isolation  Description: Interventions:  - Develop a trusting relationship   - Encourage socialization   Outcome: Not Progressing  Goal: Refrain from self-neglect  Outcome: Progressing  Goal: Attend and participate in unit activities, including therapeutic, recreational, and educational groups  Description: Interventions:  - Provide therapeutic and educational activities daily, encourage attendance and participation, and document same in the medical record   Outcome: Progressing  Goal: Complete daily ADLs, including personal hygiene independently, as able  Description: Interventions:  - Observe, teach, and assist patient with ADLS  -  Monitor and promote a balance of rest/activity, with adequate nutrition and elimination   Outcome: Progressing     Problem: Anxiety  Goal: Anxiety is at manageable level  Description: Interventions:  - Assess and monitor patient's anxiety level.   - Monitor for signs and symptoms (heart palpitations, chest pain, shortness of breath, headaches, nausea, feeling jumpy, restlessness, irritable, apprehensive).   - Collaborate with interdisciplinary team and initiate plan and interventions as ordered.  - Wheeler patient to unit/surroundings  - Explain treatment plan  - Encourage participation in  care  - Encourage verbalization of concerns/fears  - Identify coping mechanisms  - Assist in developing anxiety-reducing skills  - Administer/offer alternative therapies  - Limit or eliminate stimulants  Outcome: Progressing     Problem: Risk for Violence/Aggression Toward Others  Goal: Treatment Goal: Refrain from acts of violence/aggression during length of stay, and demonstrate improved impulse control at the time of discharge  Outcome: Progressing  Goal: Refrain from harming others  Outcome: Progressing  Goal: Refrain from destructive acts on the environment or property  Outcome: Progressing  Goal: Control angry outbursts  Description: Interventions:  - Monitor patient closely, per order  - Ensure early verbal de-escalation  - Monitor prn medication needs  - Set reasonable/therapeutic limits, outline behavioral expectations, and consequences   - Provide a non-threatening milieu, utilizing the least restrictive interventions   Outcome: Progressing  Goal: Identify appropriate positive anger management techniques  Description: Interventions:  - Offer anger management and coping skills groups   - Staff will provide positive feedback for appropriate anger control  Outcome: Progressing

## 2024-01-15 NOTE — NURSING NOTE
Patient is pleasant and cooperative with care. Denies all psych S/S. Medication compliant. Patient is isolative to room during shift, encouraged to come out. Patient denies any needs at this time. Safety checks ongoing.

## 2024-01-15 NOTE — CASE MANAGEMENT
"Patient asked to see this CM earlier today because she was upset that her ACT team was not coming out to see her today and she was upset, advised that this writer would come back later this afternoon.     Then she told one of the behavioral techs to tell this writer that she could \"kiss her ass.\" She then told the other CM to tell this writer, \"don't even bother\"     Meanwhile she has patients coming to tell this writer that she needs to see her.   "

## 2024-01-15 NOTE — PROGRESS NOTES
Progress Note - Behavioral Health   Blanca Mason 53 y.o. female MRN: [id number]  Unit/Bed#: [facility] 651-02 Encounter: [id number]    Patient was seen today for continuation of care, records reviewed and patient was discussed with the morning case review team.    Blanca was seen today for psychiatric follow-up.  On assessment today, Blanca was tearful and upset.  Stating that she does not understand what is going on on the unit, she is preoccupied with going home.  Reminded again about plan of care, patient then needed as needed Atarax due to feeling upset about pending [facility] meeting tomorrow.  No medication changes to be made at this time, patient does continue to be medication compliant.  No SI/HI.  Patient will need referral to [facility] due to low frustration tolerance and lack of judgment and impulsivity issues in the community contributing to increased suicidality.    Blanca denies acute suicidal/self-harm ideation/intent/plan upon direct inquiry at this time.  Blanca remains behaviorally appropriate, no agitation or aggression noted on exam or in report.  Blanca also denies HI/AH/VH, and does not appear overtly manic.  No overt delusions or paranoia are verbalized. Impulse control is intact.  Blanca remains adherent to her current psychotropic medication regimen and denies any side effects from medications, as well as none noted on exam.    Vitals:  Vitals:    01/15/24 0751   BP: 118/72   Pulse: 76   Resp: 17   Temp: (!) 97.3 °F (36.3 °C)   SpO2: 94%       Laboratory Results:  I have personally reviewed all pertinent laboratory/tests results.  Most Recent Labs:   Lab Results   Component Value Date    WBC 8.96 12/02/2023    RBC 4.34 12/02/2023    HGB 12.9 12/02/2023    HCT 40.5 12/02/2023     12/02/2023    RDW 14.2 12/02/2023    TOTANEUTABS 4.73 11/19/2023    NEUTROABS 5.77 12/02/2023    SODIUM 139 12/02/2023    K 3.8 12/02/2023     12/02/2023    CO2 21 12/02/2023    BUN 13 12/02/2023    CREATININE 0.62 12/02/2023     "GLUC 107 12/02/2023    GLUF 107 (H) 12/02/2023    CALCIUM 8.7 12/02/2023    AST 13 12/02/2023    ALT 13 12/02/2023    ALKPHOS 55 12/02/2023    TP 6.0 (L) 12/02/2023    ALB 3.5 12/02/2023    TBILI 0.25 12/02/2023    CHOLESTEROL 234 (H) 03/09/2023    HDL 71 03/09/2023    TRIG 153 (H) 03/09/2023    LDLCALC 132 (H) 03/09/2023    NONHDLC 163 03/09/2023    VALPROICTOT 23 (L) 11/10/2023    AMMONIA 63 11/06/2023    [id number] 4.324 12/02/2023    FREET4 0.81 11/06/2023    T3FREE 2.27 (L) 10/16/2019    PREGUR negative 07/13/2022    PREGSERUM Negative 11/06/2023    RPR Non-Reactive 07/17/2022    HGBA1C 5.0 01/05/2023    EAG 97 01/05/2023       Psychiatric Review of Systems:  Behavior over the last 24 hours:  unchanged.   Sleep: good  Appetite: good  Medication side effects: none  ROS: no complaints, denies shortness of breath or chest pain and all other systems are negative for acute changes    Mental Status Evaluation:  Appearance:  adequate grooming   Behavior:  demanding   Speech:  normal rate and volume   Mood:  \" I want to go home\"   Affect:  constricted, tearful   Thought Process:  linear   Thought Content:  preoccupied with discharge   Perceptual Disturbances: none   Risk Potential: Suicidal ideation - None  Homicidal ideation - None  Potential for aggression - No   Memory:  recent and remote memory grossly intact   Sensorium  person, place, and time/date      Consciousness:  alert and awake   Attention: attention span and concentration are age appropriate   Insight:  partial   Judgment: partial   Gait/Station: normal gait/station   Motor Activity: no abnormal movements   Progress Toward Goals:   Blanca is progressing towards goals of inpatient psychiatric treatment by continued medication compliance and is attending therapeutic modalities on the milieu. However, the patient continues to require inpatient psychiatric hospitalization for continued medication management and titration to optimize symptom reduction, improve " sleep hygiene, and demonstrate adequate self-care.    Assessment/Plan   Principal Problem:    Schizoaffective disorder, bipolar type (HCC)  Active Problems:    Benign essential hypertension    Edema    Hypothyroidism    MAG (obstructive sleep apnea)    Overactive bladder    Sjogren's syndrome (HCC)    Medical clearance for psychiatric admission    Anxiety    Borderline personality disorder (HCC)    History of pulmonary embolism    Ingrown toenail      Recommended Treatment: Treatment plan and medication changes discussed and per the attending physician the plan is:    1.Continue with group therapy, milieu therapy and occupational therapy  2.Behavioral Health checks every 7 minutes  3.Continue frequent safety checks and vitals per unit protocol  4.Continue with SLIM medical management as indicated  5.Continue with current medication regimen  6.Will review labs in the a.m.  7.Disposition Planning: Discharge planning and efforts remain ongoing    Behavioral Health Medications: all current active meds have been reviewed and continue current psychiatric medications.  Current Facility-Administered Medications   Medication Dose Route Frequency Provider Last Rate    aluminum-magnesium hydroxide-simethicone  30 mL Oral Q4H PRN JHONATAN Fields      atoMOXetine  40 mg Oral Daily Anatoly Prajapati, DO      buPROPion  450 mg Oral Daily Anatoly Prajapati, DO      cloNIDine  0.2 mg Oral Q12H [facility] JHONATAN Anne      cyanocobalamin  1,000 mcg Oral Daily JHONATAN Berg      cyanocobalamin  1,000 mcg Intramuscular Q30 Days JHONATAN Berg      ergocalciferol  50,000 Units Oral Weekly JHONATAN Berg      furosemide  20 mg Oral Daily JHONATAN Fields      hydrocortisone   Topical 4x Daily PRN JHONATAN Berg      hydrOXYzine HCL  25 mg Oral Q6H PRN Max 4/day JHONATAN Anne      hydrOXYzine HCL  50 mg Oral Q6H PRN Max 4/day JHONATAN Anne       ibuprofen  200 mg Oral Q6H PRN Virginia Mccullough, JHONATAN      ibuprofen  400 mg Oral Q6H PRN Virginia Mccullough, CRELKIN      ibuprofen  600 mg Oral Q8H PRN Virginia Mccullough, JHONATAN      levothyroxine  125 mcg Oral Early Morning Wendy Anderson, LAURANP      loperamide  2 mg Oral Q4H PRN Anatoly Prajapati, DO      LORazepam  1 mg Intramuscular Q6H PRN Max 3/day Virginia Mccullough, JHONATAN      LORazepam  1 mg Oral Q6H PRN Max 3/day JHONATAN Anne      losartan  100 mg Oral Daily Wendy Anderson, JHONATAN      melatonin  3 mg Oral HS Frederick Smith MD      naltrexone  50 mg Oral Daily Anatoly Prajapati, DO      OLANZapine  10 mg Oral Q6H PRN Frederick Smith MD      Or    OLANZapine  10 mg Intramuscular Q6H PRN Frederick Smith MD      OLANZapine  5 mg Oral Q6H PRN Frederick Smith MD      Or    OLANZapine  5 mg Intramuscular Q6H PRN Frederick Smith MD      OLANZapine  2.5 mg Oral Q3H PRN Frederick Smith MD      OXcarbazepine  450 mg Oral Q12H TASHA Frederick Smith MD      oxybutynin  5 mg Oral BID JHONATAN Fields      paliperidone  234 mg IM- Deltoid Q4 weeks JHONATAN Anne      pantoprazole  40 mg Oral Early Morning JHONATAN Fields      polyethylene glycol  17 g Oral Daily PRN JHONATAN Anne      saccharomyces boulardii  250 mg Oral BID Anatoly Prajapati, DO      senna-docusate sodium  1 tablet Oral Daily PRN JHONATAN Anne      topiramate  200 mg Oral BID Anatoly Prajapati, DO      traZODone  50 mg Oral HS PRN JHONATAN Anne      warfarin  6 mg Oral Daily (warfarin) JHONATAN Fields         Risks / Benefits of Treatment:  Risks, benefits, and possible side effects of medications explained to patient and patient verbalizes understanding and agreement for treatment.    Counseling / Coordination of Care:  Patient's progress reviewed with nursing staff.  Medications, treatment progress and treatment plan reviewed with patient.  Supportive counseling provided to the  patient.    Total floor/unit time spent today 25 minutes. Greater than 50% of total time was spent with the patient and / or family counseling and / or coordination of care. A description of the counseling / coordination of care: medication education, treatment plan, supportive therapy.    This note was completed in part utilizing Dragon dictation Software. Grammatical, translation, syntax errors, random word insertions, spelling mistakes, and incomplete sentences may be an occasional consequence of this system secondary to software limitations with voice recognition, ambient noise, and hardware issues. If you have any questions or concerns about the content, text, or information contained within the body of this dictation, please contact the provider for clarification

## 2024-01-15 NOTE — TREATMENT TEAM
Pt refused groups today and coloring in room.  Pt requesting to be seen individually/attention seeking/staff splitting.  Last week same behaviors noted and encouraged to attend groups.  Crisis, warmline, support groups and [phone] numbers discussed.       01/15/24 1330   Activity/Group Checklist   Group Other (Comment)  (community support and resources)   Attendance Refused

## 2024-01-16 LAB
INR PPP: 2.82 (ref 0.84–1.19)
PROTHROMBIN TIME: 29.9 SECONDS (ref 11.6–14.5)

## 2024-01-16 PROCEDURE — 85610 PROTHROMBIN TIME: CPT | Performed by: NURSE PRACTITIONER

## 2024-01-16 PROCEDURE — 99232 SBSQ HOSP IP/OBS MODERATE 35: CPT | Performed by: STUDENT IN AN ORGANIZED HEALTH CARE EDUCATION/TRAINING PROGRAM

## 2024-01-16 RX ORDER — WARFARIN SODIUM 5 MG/1
5 TABLET ORAL
Status: COMPLETED | OUTPATIENT
Start: 2024-01-16 | End: 2024-01-20

## 2024-01-16 RX ADMIN — ATOMOXETINE 40 MG: 40 CAPSULE ORAL at 08:20

## 2024-01-16 RX ADMIN — CYANOCOBALAMIN TAB 1000 MCG 1000 MCG: 1000 TAB at 08:20

## 2024-01-16 RX ADMIN — OXYBUTYNIN CHLORIDE 5 MG: 5 TABLET ORAL at 08:19

## 2024-01-16 RX ADMIN — Medication 250 MG: at 08:20

## 2024-01-16 RX ADMIN — NALTREXONE HYDROCHLORIDE 50 MG: 50 TABLET, FILM COATED ORAL at 08:20

## 2024-01-16 RX ADMIN — OXCARBAZEPINE 450 MG: 150 TABLET, FILM COATED ORAL at 08:19

## 2024-01-16 RX ADMIN — Medication 250 MG: at 17:18

## 2024-01-16 RX ADMIN — OXCARBAZEPINE 450 MG: 150 TABLET, FILM COATED ORAL at 21:23

## 2024-01-16 RX ADMIN — WARFARIN SODIUM 5 MG: 5 TABLET ORAL at 17:18

## 2024-01-16 RX ADMIN — OXYBUTYNIN CHLORIDE 5 MG: 5 TABLET ORAL at 17:18

## 2024-01-16 RX ADMIN — MELATONIN TAB 3 MG 3 MG: 3 TAB at 21:23

## 2024-01-16 RX ADMIN — PANTOPRAZOLE SODIUM 40 MG: 40 TABLET, DELAYED RELEASE ORAL at 05:41

## 2024-01-16 RX ADMIN — FUROSEMIDE 20 MG: 20 TABLET ORAL at 08:19

## 2024-01-16 RX ADMIN — LEVOTHYROXINE SODIUM 125 MCG: 125 TABLET ORAL at 05:41

## 2024-01-16 RX ADMIN — TRAZODONE HYDROCHLORIDE 50 MG: 50 TABLET ORAL at 21:23

## 2024-01-16 RX ADMIN — TOPIRAMATE 200 MG: 100 TABLET, FILM COATED ORAL at 17:18

## 2024-01-16 RX ADMIN — BUPROPION HYDROCHLORIDE 450 MG: 300 TABLET, EXTENDED RELEASE ORAL at 08:20

## 2024-01-16 RX ADMIN — CLONIDINE HYDROCHLORIDE 0.2 MG: 0.1 TABLET ORAL at 08:20

## 2024-01-16 RX ADMIN — LOSARTAN POTASSIUM 100 MG: 50 TABLET, FILM COATED ORAL at 08:19

## 2024-01-16 RX ADMIN — TOPIRAMATE 200 MG: 100 TABLET, FILM COATED ORAL at 08:19

## 2024-01-16 NOTE — PROGRESS NOTES
Progress Note - Behavioral Health   Blanca Mason 53 y.o. female MRN: [id number]  Unit/Bed#: [facility] 651-02 Encounter: [id number]    Patient was seen today for continuation of care, records reviewed and patient was discussed with the morning case review team.    Blanca was seen today for psychiatric follow-up.  On assessment today, Blanca was tearful.  Stating that she was worried about [facility] meeting today, patient states that she would like to go home.  In agreement to follow-up with her ACT team, patient counseled on repeat behaviors including several suicidal attempts as well as ongoing ideation.  Stating that she now knows better, patient adamantly denies any thoughts of harming herself or others.  Still often irritable, patient is noted to still have low frustration tolerance often lashing out towards others.  Case management and [facility] ACT team continue to make arrangements to ensure safe disposition.  Discharge disposition ongoing as patient has elevated risk of suicidality in the community and will need additional supportive resources to ensure positive outcomes.    Blanca denies acute suicidal/self-harm ideation/intent/plan upon direct inquiry at this time.  Blanca remains behaviorally appropriate, no agitation or aggression noted on exam or in report.  Blanca also denies HI/AH/VH, and does not appear overtly manic.  No overt delusions or paranoia are verbalized. Impulse control is ongoing.  Blanca remains adherent to her current psychotropic medication regimen and denies any side effects from medications, as well as none noted on exam.    Vitals:  Vitals:    01/16/24 0749   BP: 152/83   Pulse: 81   Resp: 16   Temp: 98.1 °F (36.7 °C)   SpO2: 96%       Laboratory Results:  I have personally reviewed all pertinent laboratory/tests results.  Most Recent Labs:   Lab Results   Component Value Date    WBC 8.96 12/02/2023    RBC 4.34 12/02/2023    HGB 12.9 12/02/2023    HCT 40.5 12/02/2023     12/02/2023    RDW 14.2  12/02/2023    TOTANEUTABS 4.73 11/19/2023    NEUTROABS 5.77 12/02/2023    SODIUM 139 12/02/2023    K 3.8 12/02/2023     12/02/2023    CO2 21 12/02/2023    BUN 13 12/02/2023    CREATININE 0.62 12/02/2023    GLUC 107 12/02/2023    GLUF 107 (H) 12/02/2023    CALCIUM 8.7 12/02/2023    AST 13 12/02/2023    ALT 13 12/02/2023    ALKPHOS 55 12/02/2023    TP 6.0 (L) 12/02/2023    ALB 3.5 12/02/2023    TBILI 0.25 12/02/2023    CHOLESTEROL 234 (H) 03/09/2023    HDL 71 03/09/2023    TRIG 153 (H) 03/09/2023    LDLCALC 132 (H) 03/09/2023    NONHDLC 163 03/09/2023    VALPROICTOT 23 (L) 11/10/2023    AMMONIA 63 11/06/2023    [id number] 4.324 12/02/2023    FREET4 0.81 11/06/2023    T3FREE 2.27 (L) 10/16/2019    PREGUR negative 07/13/2022    PREGSERUM Negative 11/06/2023    RPR Non-Reactive 07/17/2022    HGBA1C 5.0 01/05/2023    EAG 97 01/05/2023       Psychiatric Review of Systems:  Behavior over the last 24 hours:  unchanged.   Sleep: good  Appetite: good  Medication side effects: none  ROS: no complaints, denies shortness of breath or chest pain and all other systems are negative for acute changes    Mental Status Evaluation:  Appearance:  adequate grooming   Behavior:  cooperative, demanding   Speech:  normal rate and volume   Mood:  irritable   Affect:  constricted   Thought Process:  linear   Thought Content:  no overt delusions   Perceptual Disturbances: denies auditory hallucinations when asked, does not appear responding to internal stimuli   Risk Potential: Suicidal ideation - None  Homicidal ideation - None  Potential for aggression - No   Memory:  recent and remote memory grossly intact   Sensorium  person, place, and time/date      Consciousness:  alert and awake   Attention: attention span and concentration are age appropriate   Insight:  limited   Judgment: limited   Gait/Station: normal gait/station   Motor Activity: no abnormal movements   Progress Toward Goals:   Blanca is progressing towards goals of  inpatient psychiatric treatment by continued medication compliance and is attending therapeutic modalities on the milieu. However, the patient continues to require inpatient psychiatric hospitalization for continued medication management and titration to optimize symptom reduction, improve sleep hygiene, and demonstrate adequate self-care.    Assessment/Plan   Principal Problem:    Schizoaffective disorder, bipolar type (HCC)  Active Problems:    Benign essential hypertension    Edema    Hypothyroidism    MAG (obstructive sleep apnea)    Overactive bladder    Sjogren's syndrome (HCC)    Medical clearance for psychiatric admission    Anxiety    Borderline personality disorder (HCC)    History of pulmonary embolism    Ingrown toenail      Recommended Treatment: Treatment plan and medication changes discussed and per the attending physician the plan is:    1.Continue with group therapy, milieu therapy and occupational therapy  2.Behavioral Health checks every 7 minutes  3.Continue frequent safety checks and vitals per unit protocol  4.Continue with SLIM medical management as indicated  5.Continue with current medication regimen  6.Will review labs in the a.m.  7.Disposition Planning: Discharge planning and efforts remain ongoing    Behavioral Health Medications: all current active meds have been reviewed and continue current psychiatric medications.  Current Facility-Administered Medications   Medication Dose Route Frequency Provider Last Rate    aluminum-magnesium hydroxide-simethicone  30 mL Oral Q4H PRN JHONATAN Fields      atoMOXetine  40 mg Oral Daily Anatoly Prajapati,       buPROPion  450 mg Oral Daily Anatoly Prajapati DO      cloNIDine  0.2 mg Oral Q12H JHONATAN Martinez      cyanocobalamin  1,000 mcg Oral Daily JHONATAN Berg      cyanocobalamin  1,000 mcg Intramuscular Q30 Days JHONATAN Berg      ergocalciferol  50,000 Units Oral Weekly JHONATAN Berg       furosemide  20 mg Oral Daily Wendy Anderson, CRNP      hydrocortisone   Topical 4x Daily PRN Dianna Mcrae, CRNP      hydrOXYzine HCL  25 mg Oral Q6H PRN Max 4/day Virginia Mccullough, CRNP      hydrOXYzine HCL  50 mg Oral Q6H PRN Max 4/day Virginia Mccullough, CRNP      ibuprofen  200 mg Oral Q6H PRN Virginia Mccullough, CRNP      ibuprofen  400 mg Oral Q6H PRN Virginia Mccullough, CRNP      ibuprofen  600 mg Oral Q8H PRN Virginia Mccullough, CRNP      levothyroxine  125 mcg Oral Early Morning Wendy Anderson, CRNP      loperamide  2 mg Oral Q4H PRN Anatoly Prajapati, DO      LORazepam  1 mg Intramuscular Q6H PRN Max 3/day Virginia Mccullough, CRNP      LORazepam  1 mg Oral Q6H PRN Max 3/day Virginia Mccullough, CRNP      losartan  100 mg Oral Daily Wendy Anderson, CRNP      melatonin  3 mg Oral HS Frederick Smith MD      naltrexone  50 mg Oral Daily Anatoly Prajapati, DO      OLANZapine  10 mg Oral Q6H PRN Frederick Smith MD      Or    OLANZapine  10 mg Intramuscular Q6H PRN Frederick Smith MD      OLANZapine  5 mg Oral Q6H PRN Frederick Smith MD      Or    OLANZapine  5 mg Intramuscular Q6H PRN Frederick Smith MD      OLANZapine  2.5 mg Oral Q3H PRN Frederick Smith MD      OXcarbazepine  450 mg Oral Q12H [facility] Frederick Smith MD      oxybutynin  5 mg Oral BID Wendy Anderson, CRNP      paliperidone  234 mg IM- Deltoid Q4 weeks Virginia Mccullough, CRNP      pantoprazole  40 mg Oral Early Morning Wendy Anderson, CRNP      polyethylene glycol  17 g Oral Daily PRN Virginia Mccullough, CRNP      saccharomyces boulardii  250 mg Oral BID Anatoly Prajapati, DO      senna-docusate sodium  1 tablet Oral Daily PRN Virginia Mccullough, CRNP      topiramate  200 mg Oral BID Anatoly Prajapati, DO      traZODone  50 mg Oral HS PRN Virginia Mccullough, CRNP      warfarin  5 mg Oral Daily (warfarin) JHONATAN Berg         Risks / Benefits of Treatment:  Risks, benefits, and possible side effects of medications explained  to patient and patient verbalizes understanding and agreement for treatment.    Counseling / Coordination of Care:  Patient's progress reviewed with nursing staff.  Medications, treatment progress and treatment plan reviewed with patient.  Supportive counseling provided to the patient.    Total floor/unit time spent today 25 minutes. Greater than 50% of total time was spent with the patient and / or family counseling and / or coordination of care. A description of the counseling / coordination of care: medication education, treatment plan, supportive therapy.    This note was completed in part utilizing Dragon dictation Software. Grammatical, translation, syntax errors, random word insertions, spelling mistakes, and incomplete sentences may be an occasional consequence of this system secondary to software limitations with voice recognition, ambient noise, and hardware issues. If you have any questions or concerns about the content, text, or information contained within the body of this dictation, please contact the provider for clarification

## 2024-01-16 NOTE — PROGRESS NOTES
"Pt attended afternoon group.  Pt identified that she has phases of her anger and different feelings.  Pt was jovial and laughing to peers that she impulsively told a worker to \"kiss her ass\" yesterday.  Pt accusatory to peers in joking manner \"why are you staring at me?\".  Pt lacks insight and accountability to behaviors.  Pt in a joking role.      01/16/24 1330   Activity/Group Checklist   Group Anger management   Attendance Attended   Attendance Duration (min) 46-60   Interactions Interacted appropriately   Affect/Mood Bright   Goals Achieved Identified feelings;Discussed coping strategies;Able to listen to others;Able to engage in interactions;Able to reflect/comment on own behavior;Able to manage/cope with feelings;Verbalized increased hopefulness;Able to self-disclose;Able to recieve feedback;Identified distorted thoughts/beliefs;Displayed empathy;Increased hopefulness;Discussed self-esteem issues;Identified triggers;Identified relapse prevention strategies         "

## 2024-01-16 NOTE — NURSING NOTE
Pt constricted but pleasant and med-compliant with good appetite and steady gait.  VSS.  Denied SI.  Monitored for safety and support.

## 2024-01-16 NOTE — PROGRESS NOTES
01/16/24 0753   Team Meeting   Meeting Type Daily Rounds   Initial Conference Date 01/16/24   Next Conference Date 01/17/24   Team Members Present   Team Members Present Physician;Nurse;;;Occupational Therapist   Physician Team Member Dr Smith, SHEFALI Lovell   Nursing Team Member Jennifer   Care Management Team Member Atiya   Social Work Team Member Dalia TLABOT Team Member Yeison   Patient/Family Present   Patient Present No   Patient's Family Present No     Irritable with staff, demanding, acts out when her needs are not meet immediately, [facility] meeting today at 10:15 for EAC placement possible, discharge pending.

## 2024-01-16 NOTE — NURSING NOTE
Patient is isolative to room throughout shift. Patient was mute with this writer when asked assessment questions. Medication compliant. Patient encouraged to make needs known. Safety checks ongoing.

## 2024-01-16 NOTE — CASE MANAGEMENT
Had the [facility] meeting today with [place] [facility], [facility], [facility], [facility], and [facility]. During the meeting they were assessing what the best fit would be for the patient. They asked her what she would want and she said that she would prefer to go home to her place and continue with ACT and follow their DBT program. ACT stated that their next DBT group starts in about 9-10 weeks. During the meeting this writer asked what the patient would do in the meantime while waiting for DBT to start since she has such impulsivity. The patient replied that she would keep herself busy with her hobbies, such as gem art and journaling.     [facility] advised that they would like to see her out in the community rather than in an EAC setting.     BALDO KATHLEEN, Khloe, will be here to see her tomorrow to assess her and see how she is doing and then based on that they will start getting her into the pre-DBT sessions with ACT. They also advised if she comes home they will be following her meds three times a week for about a month to see how she does with them. It was also mentioned that her job will work with her schedule to ensure that she can attend all of her required DBT and therapy sessions with [facility].     After the meeting this writer sat with Blanca to see how she was feeling about the meeting and also discussed her impulsivity. She advised that she does not plan on overdosing on her pills because she does not want to come back here again. She reported that she has learned her lesson and is able to tolerate a lot more than when she first got here.

## 2024-01-16 NOTE — PLAN OF CARE
Problem: Risk for Self Injury/Neglect  Goal: Treatment Goal: Remain safe during length of stay, learn and adopt new coping skills, and be free of self-injurious ideation, impulses and acts at the time of discharge  Outcome: Progressing  Goal: Verbalize thoughts and feelings  Description: Interventions:  - Assess and re-assess patient's lethality and potential for self-injury  - Engage patient in 1:1 interactions, daily, for a minimum of 15 minutes  - Encourage patient to express feelings, fears, frustrations, hopes  - Establish rapport/trust with patient   Outcome: Progressing  Goal: Refrain from harming self  Description: Interventions:  - Monitor patient closely, per order  - Develop a trusting relationship  - Supervise medication ingestion, monitor effects and side effects   Outcome: Progressing  Goal: Recognize maladaptive responses and adopt new coping mechanisms  Outcome: Progressing  Goal: Complete daily ADLs, including personal hygiene independently, as able  Description: Interventions:  - Observe, teach, and assist patient with ADLS  - Monitor and promote a balance of rest/activity, with adequate nutrition and elimination  Outcome: Progressing     Problem: Depression  Goal: Treatment Goal: Demonstrate behavioral control of depressive symptoms, verbalize feelings of improved mood/affect, and adopt new coping skills prior to discharge  Outcome: Progressing  Goal: Verbalize thoughts and feelings  Description: Interventions:  - Assess and re-assess patient's level of risk   - Engage patient in 1:1 interactions, daily, for a minimum of 15 minutes   - Encourage patient to express feelings, fears, frustrations, hopes   Outcome: Progressing  Goal: Refrain from harming self  Description: Interventions:  - Monitor patient closely, per order   - Supervise medication ingestion, monitor effects and side effects   Outcome: Progressing  Goal: Refrain from isolation  Description: Interventions:  - Develop a trusting  relationship   - Encourage socialization   Outcome: Progressing  Goal: Refrain from self-neglect  Outcome: Progressing  Goal: Complete daily ADLs, including personal hygiene independently, as able  Description: Interventions:  - Observe, teach, and assist patient with ADLS  -  Monitor and promote a balance of rest/activity, with adequate nutrition and elimination   Outcome: Progressing     Problem: Anxiety  Goal: Anxiety is at manageable level  Description: Interventions:  - Assess and monitor patient's anxiety level.   - Monitor for signs and symptoms (heart palpitations, chest pain, shortness of breath, headaches, nausea, feeling jumpy, restlessness, irritable, apprehensive).   - Collaborate with interdisciplinary team and initiate plan and interventions as ordered.  - Merrill patient to unit/surroundings  - Explain treatment plan  - Encourage participation in care  - Encourage verbalization of concerns/fears  - Identify coping mechanisms  - Assist in developing anxiety-reducing skills  - Administer/offer alternative therapies  - Limit or eliminate stimulants  Outcome: Progressing

## 2024-01-17 PROCEDURE — 99232 SBSQ HOSP IP/OBS MODERATE 35: CPT | Performed by: STUDENT IN AN ORGANIZED HEALTH CARE EDUCATION/TRAINING PROGRAM

## 2024-01-17 RX ADMIN — WARFARIN SODIUM 5 MG: 5 TABLET ORAL at 17:10

## 2024-01-17 RX ADMIN — NALTREXONE HYDROCHLORIDE 50 MG: 50 TABLET, FILM COATED ORAL at 08:01

## 2024-01-17 RX ADMIN — OXCARBAZEPINE 450 MG: 150 TABLET, FILM COATED ORAL at 08:01

## 2024-01-17 RX ADMIN — CYANOCOBALAMIN TAB 1000 MCG 1000 MCG: 1000 TAB at 08:01

## 2024-01-17 RX ADMIN — Medication 250 MG: at 17:10

## 2024-01-17 RX ADMIN — TOPIRAMATE 200 MG: 100 TABLET, FILM COATED ORAL at 17:10

## 2024-01-17 RX ADMIN — MELATONIN TAB 3 MG 3 MG: 3 TAB at 21:14

## 2024-01-17 RX ADMIN — CLONIDINE HYDROCHLORIDE 0.2 MG: 0.1 TABLET ORAL at 08:01

## 2024-01-17 RX ADMIN — OXYBUTYNIN CHLORIDE 5 MG: 5 TABLET ORAL at 17:12

## 2024-01-17 RX ADMIN — OXCARBAZEPINE 450 MG: 150 TABLET, FILM COATED ORAL at 21:14

## 2024-01-17 RX ADMIN — FUROSEMIDE 20 MG: 20 TABLET ORAL at 08:01

## 2024-01-17 RX ADMIN — LEVOTHYROXINE SODIUM 125 MCG: 125 TABLET ORAL at 05:50

## 2024-01-17 RX ADMIN — TOPIRAMATE 200 MG: 100 TABLET, FILM COATED ORAL at 08:01

## 2024-01-17 RX ADMIN — BUPROPION HYDROCHLORIDE 450 MG: 300 TABLET, EXTENDED RELEASE ORAL at 08:01

## 2024-01-17 RX ADMIN — LOSARTAN POTASSIUM 100 MG: 50 TABLET, FILM COATED ORAL at 08:01

## 2024-01-17 RX ADMIN — PANTOPRAZOLE SODIUM 40 MG: 40 TABLET, DELAYED RELEASE ORAL at 05:50

## 2024-01-17 RX ADMIN — Medication 250 MG: at 08:01

## 2024-01-17 RX ADMIN — ATOMOXETINE 40 MG: 40 CAPSULE ORAL at 08:01

## 2024-01-17 RX ADMIN — OXYBUTYNIN CHLORIDE 5 MG: 5 TABLET ORAL at 08:05

## 2024-01-17 NOTE — PROGRESS NOTES
"Progress Note - Behavioral Health   Blanca Mason 53 y.o. female MRN: [id number]  Unit/Bed#: [facility] 651-02 Encounter: [id number]    Patient was seen today for continuation of care, records reviewed and patient was discussed with the morning case review team.    Blanca was seen today for psychiatric follow-up.  On assessment today, Blanca was in her room.  Sitting hunched over, patient exhibits poor eye contact today and states that \" I do not like the way they talk to me\".  Educated the staff treats everyone the same, patient continues to be often staff splitting and tearful.  Lacking insight into current mental illness, patient states that she is ready to be discharged today and will be okay.  Reminded of continued suicidal attempts despite resources from ACT team.  Case management actively involved with the county and will look to discharge patient to ACT team services as well as referral for DBT therapy to assist with personality disorder.  No anxiety currently verbalized.  Invega Sustenna 234 mg to be given next on 2/4/2024.  No medication changes to be made at this time.    Blanca denies acute suicidal/self-harm ideation/intent/plan upon direct inquiry at this time.  Blanca remains behaviorally appropriate, no agitation or aggression noted on exam or in report.  Blanca also denies HI/AH/VH, and does not appear overtly manic.  No overt delusions or paranoia are verbalized. Impulse control is intact.  Blanca remains adherent to her current psychotropic medication regimen and denies any side effects from medications, as well as none noted on exam.    Vitals:  Vitals:    01/17/24 0735   BP: 141/86   Pulse: 77   Resp: 18   Temp: 97.6 °F (36.4 °C)   SpO2: 95%       Laboratory Results:  I have personally reviewed all pertinent laboratory/tests results.  Most Recent Labs:   Lab Results   Component Value Date    WBC 8.96 12/02/2023    RBC 4.34 12/02/2023    HGB 12.9 12/02/2023    HCT 40.5 12/02/2023     12/02/2023    RDW " 14.2 12/02/2023    TOTANEUTABS 4.73 11/19/2023    NEUTROABS 5.77 12/02/2023    SODIUM 139 12/02/2023    K 3.8 12/02/2023     12/02/2023    CO2 21 12/02/2023    BUN 13 12/02/2023    CREATININE 0.62 12/02/2023    GLUC 107 12/02/2023    GLUF 107 (H) 12/02/2023    CALCIUM 8.7 12/02/2023    AST 13 12/02/2023    ALT 13 12/02/2023    ALKPHOS 55 12/02/2023    TP 6.0 (L) 12/02/2023    ALB 3.5 12/02/2023    TBILI 0.25 12/02/2023    CHOLESTEROL 234 (H) 03/09/2023    HDL 71 03/09/2023    TRIG 153 (H) 03/09/2023    LDLCALC 132 (H) 03/09/2023    NONHDLC 163 03/09/2023    VALPROICTOT 23 (L) 11/10/2023    AMMONIA 63 11/06/2023    [id number] 4.324 12/02/2023    FREET4 0.81 11/06/2023    T3FREE 2.27 (L) 10/16/2019    PREGUR negative 07/13/2022    PREGSERUM Negative 11/06/2023    RPR Non-Reactive 07/17/2022    HGBA1C 5.0 01/05/2023    EAG 97 01/05/2023       Psychiatric Review of Systems:  Behavior over the last 24 hours:  unchanged.   Sleep: good  Appetite: good  Medication side effects: none  ROS: no complaints, denies shortness of breath or chest pain and all other systems are negative for acute changes    Mental Status Evaluation:  Appearance:  dressed appropriately, adequate grooming   Behavior:  demanding   Speech:  normal rate and volume   Mood:  less labile, less irritable   Affect:  constricted   Thought Process:  linear   Thought Content:  no overt delusions   Perceptual Disturbances: denies auditory hallucinations when asked, does not appear responding to internal stimuli   Risk Potential: Suicidal ideation - None  Homicidal ideation - None  Potential for aggression - No   Memory:  recent and remote memory grossly intact   Sensorium  person, place, and time/date      Consciousness:  alert and awake   Attention: attention span and concentration are age appropriate   Insight:  partial   Judgment: improving   Gait/Station: normal gait/station   Motor Activity: no abnormal movements   Progress Toward Goals:   Blanca terrazas  progressing towards goals of inpatient psychiatric treatment by continued medication compliance and is attending therapeutic modalities on the milieu. However, the patient continues to require inpatient psychiatric hospitalization for continued medication management and titration to optimize symptom reduction, improve sleep hygiene, and demonstrate adequate self-care.    Assessment/Plan   Principal Problem:    Schizoaffective disorder, bipolar type (HCC)  Active Problems:    Benign essential hypertension    Edema    Hypothyroidism    MAG (obstructive sleep apnea)    Overactive bladder    Sjogren's syndrome (HCC)    Medical clearance for psychiatric admission    Anxiety    Borderline personality disorder (HCC)    History of pulmonary embolism    Ingrown toenail      Recommended Treatment: Treatment plan and medication changes discussed and per the attending physician the plan is:    1.Continue with group therapy, milieu therapy and occupational therapy  2.Behavioral Health checks every 7 minutes  3.Continue frequent safety checks and vitals per unit protocol  4.Continue with [facility] medical management as indicated  5.Continue with current medication regimen  6.Will review labs in the a.m.  7.Disposition Planning: Discharge planning and efforts remain ongoing    Behavioral Health Medications: all current active meds have been reviewed and continue current psychiatric medications.  Current Facility-Administered Medications   Medication Dose Route Frequency Provider Last Rate    aluminum-magnesium hydroxide-simethicone  30 mL Oral Q4H PRN JHONATAN Fields      atoMOXetine  40 mg Oral Daily Anatoly Prajapati,       buPROPion  450 mg Oral Daily Anatoly Prajapati,       cloNIDine  0.2 mg Oral Q12H JHONATAN Martinez      cyanocobalamin  1,000 mcg Oral Daily JHONATAN Berg      cyanocobalamin  1,000 mcg Intramuscular Q30 Days JHONATAN Berg      ergocalciferol  50,000 Units Oral  Weekly Dianna Mcrae, CRNP      furosemide  20 mg Oral Daily Wendy Anderson, CRNP      hydrocortisone   Topical 4x Daily PRN Dianna Mcrae, CRNP      hydrOXYzine HCL  25 mg Oral Q6H PRN Max 4/day Virginia Mccullough, CRNP      hydrOXYzine HCL  50 mg Oral Q6H PRN Max 4/day Virginia Mccullough, CRNP      ibuprofen  200 mg Oral Q6H PRN Virginia Mccullough, CRNP      ibuprofen  400 mg Oral Q6H PRN Virginia Mccullough, CRNP      ibuprofen  600 mg Oral Q8H PRN Virginia Mccullough, CRNP      levothyroxine  125 mcg Oral Early Morning Wendy Anderson, CRNP      loperamide  2 mg Oral Q4H PRN Anatoly Prajapati, DO      LORazepam  1 mg Intramuscular Q6H PRN Max 3/day Virginia Mccullough, CRNP      LORazepam  1 mg Oral Q6H PRN Max 3/day Virginia Mccullough, CRNP      losartan  100 mg Oral Daily Wendy Anderson, CRNP      melatonin  3 mg Oral HS Frederick Smith MD      naltrexone  50 mg Oral Daily Anatoly Prajapati, DO      OLANZapine  10 mg Oral Q6H PRN Frederick Smith MD      Or    OLANZapine  10 mg Intramuscular Q6H PRN Frederick Smith MD      OLANZapine  5 mg Oral Q6H PRN Frederick Smith MD      Or    OLANZapine  5 mg Intramuscular Q6H PRN Frederick Smith MD      OLANZapine  2.5 mg Oral Q3H PRN Frederick Smith MD      OXcarbazepine  450 mg Oral Q12H TASHA Frederick Smith MD      oxybutynin  5 mg Oral BID Wendy Anderson, CRNP      paliperidone  234 mg IM- Deltoid Q4 weeks Virginia Mccullough, CRNP      pantoprazole  40 mg Oral Early Morning Wendy Anderson, CRNP      polyethylene glycol  17 g Oral Daily PRN Virginia Mccullough, CRNP      saccharomyces boulardii  250 mg Oral BID Anatoly Prajapati, DO      senna-docusate sodium  1 tablet Oral Daily PRN Virginia Mccullough, LAURANP      topiramate  200 mg Oral BID Anatoly Prajapati, DO      traZODone  50 mg Oral HS PRN Virginia Mccullough, CRNP      warfarin  5 mg Oral Daily (warfarin) DiannaJHONATAN Lopez         Risks / Benefits of Treatment:  Risks, benefits, and possible side  effects of medications explained to patient and patient verbalizes understanding and agreement for treatment.    Counseling / Coordination of Care:  Patient's progress reviewed with nursing staff.  Medications, treatment progress and treatment plan reviewed with patient.  Supportive counseling provided to the patient.    Total floor/unit time spent today 25 minutes. Greater than 50% of total time was spent with the patient and / or family counseling and / or coordination of care. A description of the counseling / coordination of care: medication education, treatment plan, supportive therapy.    This note was completed in part utilizing Dragon dictation Software. Grammatical, translation, syntax errors, random word insertions, spelling mistakes, and incomplete sentences may be an occasional consequence of this system secondary to software limitations with voice recognition, ambient noise, and hardware issues. If you have any questions or concerns about the content, text, or information contained within the body of this dictation, please contact the provider for clarification

## 2024-01-17 NOTE — TREATMENT TEAM
Pt did not attend 2 groups today.     01/17/24 1330   Activity/Group Checklist   Group Other (Comment)  (commuication and boundaries)   Attendance Did not attend

## 2024-01-17 NOTE — PROGRESS NOTES
01/17/24 0741   Team Meeting   Meeting Type Daily Rounds   Initial Conference Date 01/17/24   Next Conference Date 01/18/24   Team Members Present   Team Members Present Physician;Nurse;;;Occupational Therapist   Physician Team Member Dr Smith, SHEFALI Lovell   Nursing Team Member Jennifer   Care Management Team Member Atiya   Social Work Team Member Dalia TALBOT Team Member Yeison   Patient/Family Present   Patient Present No   Patient's Family Present No     Add Caroline - pharmacy: [facility] meeting suggested that she go home with extra ACT team involvement including DBT therapy, ACT team coming today, discharge pending.

## 2024-01-17 NOTE — PROGRESS NOTES
01/17/24 1100   Activity/Group Checklist   Group Exercise  (light and lively seated exercise program.)   Attendance Attended   Attendance Duration (min) 31-45   Interactions Did not interact  (poor eye contact,no verbal responses while sitting with a table of her peers,squeesed stress ball but refused to engage in any of the seated exercises even when prompted.Pt.did not respond to direct questions about how she was feeling today.)   Affect/Mood Constricted   Goals Achieved Able to listen to others

## 2024-01-17 NOTE — NURSING NOTE
Pt pleasant and med-compliant with even affect.  Good appetite and steady gait.  VSS.  Attended group and social with peers.  Denied SI.  Monitored for safety and support.

## 2024-01-17 NOTE — NURSING NOTE
"Patient visible on the unit for the majority of the shift sitting with peers in the dining room. Patient brightens on approach, immediately reporting a \"great day\" related to meeting. Patient stating she looks forward to \"being home and going to programs\". Patient reporting mild anxiety, denies depression SI/HI/AVH. Patient compliant with medications and meals, appetite good. No further signs of distress noted.  "

## 2024-01-17 NOTE — PLAN OF CARE
Pt visible on unit.     Problem: Ineffective Coping  Goal: Participates in unit activities  Description: Interventions:  - Provide therapeutic environment   - Provide required programming   - Redirect inappropriate behaviors   Outcome: Progressing

## 2024-01-18 ENCOUNTER — TELEPHONE (OUTPATIENT)
Dept: NEUROLOGY | Facility: CLINIC | Age: 54
End: 2024-01-18

## 2024-01-18 PROCEDURE — 99232 SBSQ HOSP IP/OBS MODERATE 35: CPT | Performed by: STUDENT IN AN ORGANIZED HEALTH CARE EDUCATION/TRAINING PROGRAM

## 2024-01-18 RX ADMIN — LOSARTAN POTASSIUM 100 MG: 50 TABLET, FILM COATED ORAL at 08:07

## 2024-01-18 RX ADMIN — Medication 250 MG: at 08:08

## 2024-01-18 RX ADMIN — TOPIRAMATE 200 MG: 100 TABLET, FILM COATED ORAL at 17:24

## 2024-01-18 RX ADMIN — OXCARBAZEPINE 450 MG: 150 TABLET, FILM COATED ORAL at 08:08

## 2024-01-18 RX ADMIN — CLONIDINE HYDROCHLORIDE 0.2 MG: 0.1 TABLET ORAL at 08:08

## 2024-01-18 RX ADMIN — OXYBUTYNIN CHLORIDE 5 MG: 5 TABLET ORAL at 17:24

## 2024-01-18 RX ADMIN — WARFARIN SODIUM 5 MG: 5 TABLET ORAL at 17:24

## 2024-01-18 RX ADMIN — ATOMOXETINE 40 MG: 40 CAPSULE ORAL at 08:09

## 2024-01-18 RX ADMIN — CYANOCOBALAMIN TAB 1000 MCG 1000 MCG: 1000 TAB at 08:08

## 2024-01-18 RX ADMIN — FUROSEMIDE 20 MG: 20 TABLET ORAL at 08:08

## 2024-01-18 RX ADMIN — OXCARBAZEPINE 450 MG: 150 TABLET, FILM COATED ORAL at 21:36

## 2024-01-18 RX ADMIN — PANTOPRAZOLE SODIUM 40 MG: 40 TABLET, DELAYED RELEASE ORAL at 06:14

## 2024-01-18 RX ADMIN — Medication 250 MG: at 17:24

## 2024-01-18 RX ADMIN — LEVOTHYROXINE SODIUM 125 MCG: 125 TABLET ORAL at 06:14

## 2024-01-18 RX ADMIN — NALTREXONE HYDROCHLORIDE 50 MG: 50 TABLET, FILM COATED ORAL at 08:07

## 2024-01-18 RX ADMIN — MELATONIN TAB 3 MG 3 MG: 3 TAB at 21:36

## 2024-01-18 RX ADMIN — BUPROPION HYDROCHLORIDE 450 MG: 300 TABLET, EXTENDED RELEASE ORAL at 08:07

## 2024-01-18 RX ADMIN — TOPIRAMATE 200 MG: 100 TABLET, FILM COATED ORAL at 08:08

## 2024-01-18 RX ADMIN — OXYBUTYNIN CHLORIDE 5 MG: 5 TABLET ORAL at 08:11

## 2024-01-18 NOTE — NURSING NOTE
Pt anxious but pleasant and med-compliant with good appetite and steady gait.  VSS.  Attended group.  Denied SI.  Monitored for safety and support.    Cheek Interpolation Flap Text: A decision was made to reconstruct the defect utilizing an interpolation axial flap and a staged reconstruction.  A telfa template was made of the defect.  This telfa template was then used to outline the Cheek Interpolation flap.  The donor area for the pedicle flap was then injected with anesthesia.  The flap was excised through the skin and subcutaneous tissue down to the layer of the underlying musculature.  The interpolation flap was carefully excised within this deep plane to maintain its blood supply.  The edges of the donor site were undermined.   The donor site was closed in a primary fashion.  The pedicle was then rotated into position and sutured.  Once the tube was sutured into place, adequate blood supply was confirmed with blanching and refill.  The pedicle was then wrapped with xeroform gauze and dressed appropriately with a telfa and gauze bandage to ensure continued blood supply and protect the attached pedicle.

## 2024-01-18 NOTE — TREATMENT TEAM
Pt attended All groups.  Pt bright and able to self express.  Pt hopeful about d.c date and the programming/services.  Pt mentioned following the routine and sticking to what is required,  Pt expressed interest in learning more about mindfulness and DBT.     01/18/24 1000   Activity/Group Checklist   Group Community meeting   Attendance Attended   Attendance Duration (min) 31-45   Interactions Interacted appropriately   Affect/Mood Appropriate   Goals Achieved Identified feelings;Identified relapse prevention strategies;Increased hopefulness;Discussed coping strategies;Identified resources and support systems;Able to listen to others;Able to reflect/comment on own behavior;Able to engage in interactions;Able to manage/cope with feelings;Verbalized increased hopefulness;Able to self-disclose;Able to recieve feedback;Discussed discharge plans

## 2024-01-18 NOTE — NURSING NOTE
Pt is pleasant on approach. She is visible and social in the milieu. Pt in behavioral control. Pt denies any unmet needs at this time.

## 2024-01-18 NOTE — PLAN OF CARE
Problem: Risk for Self Injury/Neglect  Goal: Treatment Goal: Remain safe during length of stay, learn and adopt new coping skills, and be free of self-injurious ideation, impulses and acts at the time of discharge  Outcome: Progressing  Goal: Verbalize thoughts and feelings  Description: Interventions:  - Assess and re-assess patient's lethality and potential for self-injury  - Engage patient in 1:1 interactions, daily, for a minimum of 15 minutes  - Encourage patient to express feelings, fears, frustrations, hopes  - Establish rapport/trust with patient   Outcome: Progressing  Goal: Refrain from harming self  Description: Interventions:  - Monitor patient closely, per order  - Develop a trusting relationship  - Supervise medication ingestion, monitor effects and side effects   Outcome: Progressing  Goal: Attend and participate in unit activities, including therapeutic, recreational, and educational groups  Description: Interventions:  - Provide therapeutic and educational activities daily, encourage attendance and participation, and document same in the medical record  - Obtain collateral information, encourage visitation and family involvement in care   Outcome: Progressing  Goal: Recognize maladaptive responses and adopt new coping mechanisms  Outcome: Progressing  Goal: Complete daily ADLs, including personal hygiene independently, as able  Description: Interventions:  - Observe, teach, and assist patient with ADLS  - Monitor and promote a balance of rest/activity, with adequate nutrition and elimination  Outcome: Progressing     Problem: Depression  Goal: Treatment Goal: Demonstrate behavioral control of depressive symptoms, verbalize feelings of improved mood/affect, and adopt new coping skills prior to discharge  Outcome: Progressing  Goal: Verbalize thoughts and feelings  Description: Interventions:  - Assess and re-assess patient's level of risk   - Engage patient in 1:1 interactions, daily, for a minimum  of 15 minutes   - Encourage patient to express feelings, fears, frustrations, hopes   Outcome: Progressing  Goal: Refrain from harming self  Description: Interventions:  - Monitor patient closely, per order   - Supervise medication ingestion, monitor effects and side effects   Outcome: Progressing  Goal: Refrain from isolation  Description: Interventions:  - Develop a trusting relationship   - Encourage socialization   Outcome: Progressing  Goal: Refrain from self-neglect  Outcome: Progressing  Goal: Attend and participate in unit activities, including therapeutic, recreational, and educational groups  Description: Interventions:  - Provide therapeutic and educational activities daily, encourage attendance and participation, and document same in the medical record   Outcome: Progressing  Goal: Complete daily ADLs, including personal hygiene independently, as able  Description: Interventions:  - Observe, teach, and assist patient with ADLS  -  Monitor and promote a balance of rest/activity, with adequate nutrition and elimination   Outcome: Progressing     Problem: Anxiety  Goal: Anxiety is at manageable level  Description: Interventions:  - Assess and monitor patient's anxiety level.   - Monitor for signs and symptoms (heart palpitations, chest pain, shortness of breath, headaches, nausea, feeling jumpy, restlessness, irritable, apprehensive).   - Collaborate with interdisciplinary team and initiate plan and interventions as ordered.  - [place] patient to unit/surroundings  - Explain treatment plan  - Encourage participation in care  - Encourage verbalization of concerns/fears  - Identify coping mechanisms  - Assist in developing anxiety-reducing skills  - Administer/offer alternative therapies  - Limit or eliminate stimulants  Outcome: Progressing     Problem: Risk for Violence/Aggression Toward Others  Goal: Treatment Goal: Refrain from acts of violence/aggression during length of stay, and demonstrate improved  impulse control at the time of discharge  Outcome: Progressing  Goal: Verbalize thoughts and feelings  Description: Interventions:  - Assess and re-assess patient's level of risk, every waking shift  - Engage patient in 1:1 interactions, daily, for a minimum of 15 minutes   - Allow patient to express feelings and frustrations in a safe and non-threatening manner   - Establish rapport/trust with patient   Outcome: Progressing  Goal: Refrain from harming others  Outcome: Progressing  Goal: Refrain from destructive acts on the environment or property  Outcome: Progressing  Goal: Control angry outbursts  Description: Interventions:  - Monitor patient closely, per order  - Ensure early verbal de-escalation  - Monitor prn medication needs  - Set reasonable/therapeutic limits, outline behavioral expectations, and consequences   - Provide a non-threatening milieu, utilizing the least restrictive interventions   Outcome: Progressing  Goal: Attend and participate in unit activities, including therapeutic, recreational, and educational groups  Description: Interventions:  - Provide therapeutic and educational activities daily, encourage attendance and participation, and document same in the medical record   Outcome: Progressing  Goal: Identify appropriate positive anger management techniques  Description: Interventions:  - Offer anger management and coping skills groups   - Staff will provide positive feedback for appropriate anger control  Outcome: Progressing     Problem: DISCHARGE PLANNING - CARE MANAGEMENT  Goal: Discharge to post-acute care or home with appropriate resources  Description: INTERVENTIONS:  - Conduct assessment to determine patient/family and health care team treatment goals, and need for post-acute services based on payer coverage, community resources, and patient preferences, and barriers to discharge  - Address psychosocial, clinical, and financial barriers to discharge as identified in assessment in  conjunction with the patient/family and health care team  - Arrange appropriate level of post-acute services according to patient’s   needs and preference and payer coverage in collaboration with the physician and health care team  - Communicate with and update the patient/family, physician, and health care team regarding progress on the discharge plan  - Arrange appropriate transportation to post-acute venues  Outcome: Progressing     Problem: SAFETY, RESTRAINT - VIOLENT/SELF-DESTRUCTIVE  Goal: Remains free of harm/injury from restraints (Restraint for Violent/Self-Destructive Behavior)  Description: INTERVENTIONS:  - Instruct patient/family regarding restraint use   - Assess and monitor physiologic and psychological status   - Provide interventions and comfort measures to meet assessed patient needs   - Ensure continuous in person monitoring is provided   - Identify and implement measures to help patient regain control  - Assess readiness for release of restraint  Outcome: Progressing  Goal: Returns to optimal restraint-free functioning  Description: INTERVENTIONS:  - Assess the patient's behavior and symptoms that indicate continued need for restraint  - Identify and implement measures to help patient regain control  - Assess readiness for release of restraint   Outcome: Progressing

## 2024-01-18 NOTE — NURSING NOTE
Patient seen in room reading. Patient stated she received good news today in regards to discharge. Patient denies all. VSS. Appetite intact. Continual rounding in place

## 2024-01-18 NOTE — PROGRESS NOTES
Progress Note - Behavioral Health   Blanca Mason 53 y.o. female MRN: [id number]  Unit/Bed#: [facility] 651-02 Encounter: [id number]    Patient was seen today for continuation of care, records reviewed and patient was discussed with the morning case review team.    Blanca was seen today for psychiatric follow-up.  On assessment today, Blanca was still withdrawn but less tearful.  Stating that she is looking forward to DBT, patient also states that she is continuing to work on anger management worksheets given by staff.  No outburst noted overnight, no as needed medications given.  We will continue with current medication regimen, all medications tolerated well.  We will anticipate discharge later this month to outpatient services including DBT therapy to assist with symptoms.    Blanca denies acute suicidal/self-harm ideation/intent/plan upon direct inquiry at this time.  Blanca remains behaviorally appropriate, no agitation or aggression noted on exam or in report.  Blanca also denies HI/AH/VH, and does not appear overtly manic.  No overt delusions or paranoia are verbalized. Impulse control is intact.  Blanca remains adherent to her current psychotropic medication regimen and denies any side effects from medications, as well as none noted on exam.    Vitals:  Vitals:    01/18/24 0740   BP: 152/79   Pulse: 75   Resp: 16   Temp: (!) 97.3 °F (36.3 °C)   SpO2: 96%       Laboratory Results:  Most Recent Labs:   Lab Results   Component Value Date    WBC 8.96 12/02/2023    RBC 4.34 12/02/2023    HGB 12.9 12/02/2023    HCT 40.5 12/02/2023     12/02/2023    RDW 14.2 12/02/2023    TOTANEUTABS 4.73 11/19/2023    NEUTROABS 5.77 12/02/2023    SODIUM 139 12/02/2023    K 3.8 12/02/2023     12/02/2023    CO2 21 12/02/2023    BUN 13 12/02/2023    CREATININE 0.62 12/02/2023    GLUC 107 12/02/2023    GLUF 107 (H) 12/02/2023    CALCIUM 8.7 12/02/2023    AST 13 12/02/2023    ALT 13 12/02/2023    ALKPHOS 55 12/02/2023    TP 6.0 (L)  12/02/2023    ALB 3.5 12/02/2023    TBILI 0.25 12/02/2023    CHOLESTEROL 234 (H) 03/09/2023    HDL 71 03/09/2023    TRIG 153 (H) 03/09/2023    LDLCALC 132 (H) 03/09/2023    NONHDLC 163 03/09/2023    VALPROICTOT 23 (L) 11/10/2023    AMMONIA 63 11/06/2023    SGU2JLGLNAVI 4.324 12/02/2023    FREET4 0.81 11/06/2023    T3FREE 2.27 (L) 10/16/2019    PREGUR negative 07/13/2022    PREGSERUM Negative 11/06/2023    RPR Non-Reactive 07/17/2022    HGBA1C 5.0 01/05/2023    EAG 97 01/05/2023       Psychiatric Review of Systems:  Behavior over the last 24 hours:  unchanged.   Sleep: good  Appetite: good  Medication side effects: none  ROS: no complaints, denies shortness of breath or chest pain and all other systems are negative for acute changes    Mental Status Evaluation:  Appearance:  dressed appropriately, adequate grooming   Behavior:  demanding   Speech:  normal rate and volume   Mood:  less anxious, still at times irritable   Affect:  constricted   Thought Process:  linear   Thought Content:  no overt delusions   Perceptual Disturbances: denies auditory hallucinations when asked, does not appear responding to internal stimuli   Risk Potential: Suicidal ideation - None  Homicidal ideation - None  Potential for aggression - No   Memory:  recent and remote memory grossly intact   Sensorium  person, place, and time/date      Consciousness:  alert and awake   Attention: attention span and concentration are age appropriate   Insight:  improving   Judgment: improving   Gait/Station: normal gait/station   Motor Activity: no abnormal movements   Progress Toward Goals:   Blanca is progressing towards goals of inpatient psychiatric treatment by continued medication compliance and is attending therapeutic modalities on the milieu. However, the patient continues to require inpatient psychiatric hospitalization for continued medication management and titration to optimize symptom reduction, improve sleep hygiene, and demonstrate adequate  self-care.    Assessment/Plan   Principal Problem:    Schizoaffective disorder, bipolar type (HCC)  Active Problems:    Benign essential hypertension    Edema    Hypothyroidism    MAG (obstructive sleep apnea)    Overactive bladder    Sjogren's syndrome (HCC)    Medical clearance for psychiatric admission    Anxiety    Borderline personality disorder (HCC)    History of pulmonary embolism    Ingrown toenail      Recommended Treatment: Treatment plan and medication changes discussed and per the attending physician the plan is:    1.Continue with group therapy, milieu therapy and occupational therapy  2.Behavioral Health checks every 7 minutes  3.Continue frequent safety checks and vitals per unit protocol  4.Continue with [facility] medical management as indicated  5.Continue with current medication regimen  6.Will review labs in the a.m.  7.Disposition Planning: Discharge planning and efforts remain ongoing    Behavioral Health Medications: all current active meds have been reviewed and continue current psychiatric medications.  Current Facility-Administered Medications   Medication Dose Route Frequency Provider Last Rate    aluminum-magnesium hydroxide-simethicone  30 mL Oral Q4H PRN Wendy Anderson, LAURANP      atoMOXetine  40 mg Oral Daily Anatoly Prajapati, DO      buPROPion  450 mg Oral Daily Anatoly Prajapati, DO      cloNIDine  0.2 mg Oral Q12H TASHA Virginia Mccullough, JHONATAN      cyanocobalamin  1,000 mcg Oral Daily Dianna Mcrae, CRNP      cyanocobalamin  1,000 mcg Intramuscular Q30 Days Dianna Mcrae, JHONATAN      ergocalciferol  50,000 Units Oral Weekly Dianna Mcrae, LAURANP      furosemide  20 mg Oral Daily Wendy Anderson, CRNP      hydrocortisone   Topical 4x Daily PRN Dianna Mcrae, LAURANP      hydrOXYzine HCL  25 mg Oral Q6H PRN Max 4/day Virginia Mccullough, CRNP      hydrOXYzine HCL  50 mg Oral Q6H PRN Max 4/day Virginia Mccullough, CRNP      ibuprofen  200 mg Oral Q6H PRN Virginia Mccullough,  CRNP      ibuprofen  400 mg Oral Q6H PRN Virginia Mccullough, CRNP      ibuprofen  600 mg Oral Q8H PRN Virginia Mccullough, CRELKIN      levothyroxine  125 mcg Oral Early Morning Wendy Anderson, CRNP      loperamide  2 mg Oral Q4H PRN Anatoly Prajapati, DO      LORazepam  1 mg Intramuscular Q6H PRN Max 3/day Virginia Mccullough, CRNP      LORazepam  1 mg Oral Q6H PRN Max 3/day Virginia Mccullough, JHONATAN      losartan  100 mg Oral Daily Wendy Anderson, CRNP      melatonin  3 mg Oral HS Frederick Smith MD      naltrexone  50 mg Oral Daily Anatoly Prajapati, DO      OLANZapine  10 mg Oral Q6H PRN Frederick Smith MD      Or    OLANZapine  10 mg Intramuscular Q6H PRN Frederick Smith MD      OLANZapine  5 mg Oral Q6H PRN Frederick Smith MD      Or    OLANZapine  5 mg Intramuscular Q6H PRN Frederick Smith MD      OLANZapine  2.5 mg Oral Q3H PRN Frederick Smith MD      OXcarbazepine  450 mg Oral Q12H [facility] Frederick Smith MD      oxybutynin  5 mg Oral BID Wendy Anderson, JHONATAN      paliperidone  234 mg IM- Deltoid Q4 weeks Virginia Mccullough, JHONATAN      pantoprazole  40 mg Oral Early Morning Wendy Anderson, LAURANP      polyethylene glycol  17 g Oral Daily PRN JHONATAN Anne      saccharomyces boulardii  250 mg Oral BID Anatoly Prajapati, DO      senna-docusate sodium  1 tablet Oral Daily PRN Virginia Mccullough, JHONATAN      topiramate  200 mg Oral BID Anatoly Prajapati, DO      traZODone  50 mg Oral HS PRN Virginia Mccullough, JHONATAN      warfarin  5 mg Oral Daily (warfarin) JHONATAN Berg         Risks / Benefits of Treatment:  Risks, benefits, and possible side effects of medications explained to patient and patient verbalizes understanding and agreement for treatment.    Counseling / Coordination of Care:  Patient's progress reviewed with nursing staff.  Medications, treatment progress and treatment plan reviewed with patient.  Supportive counseling provided to the patient.    Total floor/unit time spent today 25  minutes. Greater than 50% of total time was spent with the patient and / or family counseling and / or coordination of care. A description of the counseling / coordination of care: medication education, treatment plan, supportive therapy.    This note was completed in part utilizing Dragon dictation Software. Grammatical, translation, syntax errors, random word insertions, spelling mistakes, and incomplete sentences may be an occasional consequence of this system secondary to software limitations with voice recognition, ambient noise, and hardware issues. If you have any questions or concerns about the content, text, or information contained within the body of this dictation, please contact the provider for clarification

## 2024-01-18 NOTE — PROGRESS NOTES
01/18/24 0809   Team Meeting   Meeting Type Daily Rounds   Initial Conference Date 01/18/24   Next Conference Date 01/19/24   Team Members Present   Team Members Present Physician;Nurse;;;Occupational Therapist   Physician Team Member Dr Smith, Dr Epps, SHEFALI Lovell   Nursing Team Member Jennifer   Care Management Team Member Atiya   Social Work Team Member Dalia   OT Team Member Yeison   Patient/Family Present   Patient Present No   Patient's Family Present No     Discharge on 2/1/24, will continue with ACT team.

## 2024-01-18 NOTE — TELEPHONE ENCOUNTER
Patient had a Botox appointment scheduled for today 01/18/24, but I cancelled as patient is currently admitted in the HOSP since 12/01/23. I will keep an eye out on patient's chart to see when she is out to call back to reschedule injections.

## 2024-01-19 PROCEDURE — 99232 SBSQ HOSP IP/OBS MODERATE 35: CPT | Performed by: STUDENT IN AN ORGANIZED HEALTH CARE EDUCATION/TRAINING PROGRAM

## 2024-01-19 RX ADMIN — OXYBUTYNIN CHLORIDE 5 MG: 5 TABLET ORAL at 08:13

## 2024-01-19 RX ADMIN — TOPIRAMATE 200 MG: 100 TABLET, FILM COATED ORAL at 08:11

## 2024-01-19 RX ADMIN — BUPROPION HYDROCHLORIDE 450 MG: 300 TABLET, EXTENDED RELEASE ORAL at 08:10

## 2024-01-19 RX ADMIN — PANTOPRAZOLE SODIUM 40 MG: 40 TABLET, DELAYED RELEASE ORAL at 05:51

## 2024-01-19 RX ADMIN — CLONIDINE HYDROCHLORIDE 0.2 MG: 0.1 TABLET ORAL at 08:10

## 2024-01-19 RX ADMIN — Medication 250 MG: at 08:11

## 2024-01-19 RX ADMIN — OXCARBAZEPINE 450 MG: 150 TABLET, FILM COATED ORAL at 08:10

## 2024-01-19 RX ADMIN — FUROSEMIDE 20 MG: 20 TABLET ORAL at 08:11

## 2024-01-19 RX ADMIN — OXYBUTYNIN CHLORIDE 5 MG: 5 TABLET ORAL at 17:18

## 2024-01-19 RX ADMIN — CLONIDINE HYDROCHLORIDE 0.2 MG: 0.1 TABLET ORAL at 21:24

## 2024-01-19 RX ADMIN — TOPIRAMATE 200 MG: 100 TABLET, FILM COATED ORAL at 17:18

## 2024-01-19 RX ADMIN — LEVOTHYROXINE SODIUM 125 MCG: 125 TABLET ORAL at 05:51

## 2024-01-19 RX ADMIN — NALTREXONE HYDROCHLORIDE 50 MG: 50 TABLET, FILM COATED ORAL at 08:10

## 2024-01-19 RX ADMIN — ATOMOXETINE 40 MG: 40 CAPSULE ORAL at 08:11

## 2024-01-19 RX ADMIN — Medication 250 MG: at 17:18

## 2024-01-19 RX ADMIN — CYANOCOBALAMIN TAB 1000 MCG 1000 MCG: 1000 TAB at 08:10

## 2024-01-19 RX ADMIN — LOSARTAN POTASSIUM 100 MG: 50 TABLET, FILM COATED ORAL at 08:10

## 2024-01-19 RX ADMIN — MELATONIN TAB 3 MG 3 MG: 3 TAB at 21:24

## 2024-01-19 RX ADMIN — WARFARIN SODIUM 5 MG: 5 TABLET ORAL at 17:18

## 2024-01-19 RX ADMIN — OXCARBAZEPINE 450 MG: 150 TABLET, FILM COATED ORAL at 21:24

## 2024-01-19 NOTE — NURSING NOTE
Pt constricted but pleasant and med-compliant with good appetite and steady gait.  VSS.  Pt attended group.  Pt denied SI.  Monitored for safety and support.

## 2024-01-19 NOTE — PROGRESS NOTES
01/19/24 0737   Team Meeting   Meeting Type Daily Rounds   Initial Conference Date 01/19/24   Next Conference Date 01/22/24   Team Members Present   Team Members Present Physician;Nurse;;;Occupational Therapist   Physician Team Member Dr Smith, Dr Quispe, SHEFALI Leon   Nursing Team Member Jennifer   Care Management Team Member Atiya   Social Work Team Member Dalia TALBOT Team Member Yeison   Patient/Family Present   Patient Present No   Patient's Family Present No     Discharge on 2/1/24, will resume with [facility].

## 2024-01-19 NOTE — NURSING NOTE
PT visible on the unit socializing with peers. Pt is calm and cooperative. Pt denies all psych s/s. Pt denies any unmet needs at this time. Continual rounding in place.

## 2024-01-19 NOTE — PROGRESS NOTES
"   01/19/24 1100 01/19/24 1400   Activity/Group Checklist   Group Community meeting  (refuting negative thoughts.) Wellness  (guided imagery relaxtion \"wooded walk\")   Attendance Attended Attended   Attendance Duration (min) 31-45 16-30   Interactions Interacted appropriately  (Pt. apoligetic for previous group behavors,expressed upbeat and relief emotions that she wiil be discharged soon.Pt.did state fearful thought that she will get a negative roomate in the future.) Interacted appropriately  (Pt. jovial and expressed feeling so much more excited now that she will be going home soon.)   Affect/Mood Calm;Appropriate;Normal range Appropriate;Bright;Normal range   Goals Achieved Able to self-disclose;Identified feelings;Discussed coping strategies;Able to engage in interactions;Able to listen to others Able to engage in interactions;Identified feelings;Identified triggers;Able to listen to others;Able to self-disclose       "

## 2024-01-19 NOTE — PROGRESS NOTES
Progress Note - Behavioral Health   Blanca Mason 53 y.o. female MRN: [id number]  Unit/Bed#: [facility] 651-02 Encounter: [id number]    Patient was seen today for continuation of care, records reviewed and patient was discussed with the morning case review team.    Blanca was seen today for psychiatric follow-up.  On assessment today, Blanca was more pleasant and noted to be smiling during the interview.  Able to show this writer anger management worksheets, patient states that she continues to work on managing her frustrations.  No depression or anxiety currently verbalized.  Patient reports optimism about discharge and following DBT therapy.  No medication changes to be made at this time.  No changes in sleep or appetite reported.  We will anticipate discharge on February 2, 2024 with ACT team services as well as DBT therapy.    Blanca denies acute suicidal/self-harm ideation/intent/plan upon direct inquiry at this time.  Blanca remains behaviorally appropriate, no agitation or aggression noted on exam or in report.  Blanca also denies HI/AH/VH, and does not appear overtly manic.  No overt delusions or paranoia are verbalized. Impulse control is intact.  Blanca remains adherent to her current psychotropic medication regimen and denies any side effects from medications, as well as none noted on exam.    Vitals:  Vitals:    01/19/24 0806   BP: 137/68   Pulse: 71   Resp: 16   Temp: (!) 97.3 °F (36.3 °C)   SpO2: 96%       Laboratory Results:  I have personally reviewed all pertinent laboratory/tests results.  Most Recent Labs:   Lab Results   Component Value Date    WBC 8.96 12/02/2023    RBC 4.34 12/02/2023    HGB 12.9 12/02/2023    HCT 40.5 12/02/2023     12/02/2023    RDW 14.2 12/02/2023    TOTANEUTABS 4.73 11/19/2023    NEUTROABS 5.77 12/02/2023    SODIUM 139 12/02/2023    K 3.8 12/02/2023     12/02/2023    CO2 21 12/02/2023    BUN 13 12/02/2023    CREATININE 0.62 12/02/2023    GLUC 107 12/02/2023    GLUF 107 (H)  12/02/2023    CALCIUM 8.7 12/02/2023    AST 13 12/02/2023    ALT 13 12/02/2023    ALKPHOS 55 12/02/2023    TP 6.0 (L) 12/02/2023    ALB 3.5 12/02/2023    TBILI 0.25 12/02/2023    CHOLESTEROL 234 (H) 03/09/2023    HDL 71 03/09/2023    TRIG 153 (H) 03/09/2023    LDLCALC 132 (H) 03/09/2023    NONHDLC 163 03/09/2023    VALPROICTOT 23 (L) 11/10/2023    AMMONIA 63 11/06/2023    OVQ7XGLMASBY 4.324 12/02/2023    FREET4 0.81 11/06/2023    T3FREE 2.27 (L) 10/16/2019    PREGUR negative 07/13/2022    PREGSERUM Negative 11/06/2023    RPR Non-Reactive 07/17/2022    HGBA1C 5.0 01/05/2023    EAG 97 01/05/2023       Psychiatric Review of Systems:  Behavior over the last 24 hours:  unchanged.   Sleep: good  Appetite: good  Medication side effects: none  ROS: no complaints, denies shortness of breath or chest pain and all other systems are negative for acute changes    Mental Status Evaluation:  Appearance:  adequate grooming   Behavior:  cooperative, calm   Speech:  normal rate and volume   Mood:  less labile, less irritable   Affect:  constricted, slightly brighter   Thought Process:  goal directed, linear   Thought Content:  no overt delusions   Perceptual Disturbances: denies auditory hallucinations when asked, does not appear responding to internal stimuli   Risk Potential: Suicidal ideation - None  Homicidal ideation - None  Potential for aggression - No   Memory:  recent and remote memory grossly intact   Sensorium  person, place, and time/date      Consciousness:  alert and awake   Attention: attention span and concentration are age appropriate   Insight:  partial   Judgment: partial   Gait/Station: normal gait/station   Motor Activity: no abnormal movements   Progress Toward Goals:   Blanca is progressing towards goals of inpatient psychiatric treatment by continued medication compliance and is attending therapeutic modalities on the milieu. However, the patient continues to require inpatient psychiatric hospitalization for  continued medication management and titration to optimize symptom reduction, improve sleep hygiene, and demonstrate adequate self-care.    Assessment/Plan   Principal Problem:    Schizoaffective disorder, bipolar type (HCC)  Active Problems:    Benign essential hypertension    Edema    Hypothyroidism    MAG (obstructive sleep apnea)    Overactive bladder    Sjogren's syndrome (HCC)    Medical clearance for psychiatric admission    Anxiety    Borderline personality disorder (HCC)    History of pulmonary embolism    Ingrown toenail      Recommended Treatment: Treatment plan and medication changes discussed and per the attending physician the plan is:    1.Continue with group therapy, milieu therapy and occupational therapy  2.Behavioral Health checks every 7 minutes  3.Continue frequent safety checks and vitals per unit protocol  4.Continue with SLIM medical management as indicated  5.Continue with current medication regimen  6.Will review labs in the a.m.  7.Disposition Planning: Discharge planning and efforts remain ongoing    Behavioral Health Medications: all current active meds have been reviewed and continue current psychiatric medications.  Current Facility-Administered Medications   Medication Dose Route Frequency Provider Last Rate    aluminum-magnesium hydroxide-simethicone  30 mL Oral Q4H PRN JHONATAN Fields      atoMOXetine  40 mg Oral Daily Anatoly Prajapati, DO      buPROPion  450 mg Oral Daily Anatoly Prajapati, DO      cloNIDine  0.2 mg Oral Q12H [facility] JHONATAN Anne      cyanocobalamin  1,000 mcg Oral Daily JHONATAN Berg      cyanocobalamin  1,000 mcg Intramuscular Q30 Days JHONATAN Berg      ergocalciferol  50,000 Units Oral Weekly JHONATAN Berg      furosemide  20 mg Oral Daily JHONATAN Fields      hydrocortisone   Topical 4x Daily PRN JHONATAN Berg      hydrOXYzine HCL  25 mg Oral Q6H PRN Max 4/day JHONATAN Anne       hydrOXYzine HCL  50 mg Oral Q6H PRN Max 4/day Virginia Mccullough, JHONATAN      ibuprofen  200 mg Oral Q6H PRN Virginia Mccullough, CRNP      ibuprofen  400 mg Oral Q6H PRN Virginia Mccullough, CRNP      ibuprofen  600 mg Oral Q8H PRN Virginia Mccullough, CRELKIN      levothyroxine  125 mcg Oral Early Morning Wendy Anderson, JHONATAN      loperamide  2 mg Oral Q4H PRN Anatoly Prajapati, DO      LORazepam  1 mg Intramuscular Q6H PRN Max 3/day Virginia Mccullough, JHONATAN      LORazepam  1 mg Oral Q6H PRN Max 3/day Virginia Mccullough, JHONATAN      losartan  100 mg Oral Daily Wendy Anderson, JHONATAN      melatonin  3 mg Oral HS Frederick Smith MD      naltrexone  50 mg Oral Daily Anatoly Prajapati, DO      OLANZapine  10 mg Oral Q6H PRN Frederick Smith MD      Or    OLANZapine  10 mg Intramuscular Q6H PRN Frederick Smith MD      OLANZapine  5 mg Oral Q6H PRN Frederick Smith MD      Or    OLANZapine  5 mg Intramuscular Q6H PRN Frederick Smith MD      OLANZapine  2.5 mg Oral Q3H PRN Frederick Smith MD      OXcarbazepine  450 mg Oral Q12H TASHA Frederick Smith MD      oxybutynin  5 mg Oral BID JHONATAN Fields      [START ON 1/31/2024] paliperidone  234 mg IM- Deltoid Q4 weeks Frederick Smith MD      pantoprazole  40 mg Oral Early Morning JHONATAN Fields      polyethylene glycol  17 g Oral Daily PRN iVrginia Mccullough, JHONATAN      saccharomyces boulardii  250 mg Oral BID Anatoly Prajapati, DO      senna-docusate sodium  1 tablet Oral Daily PRN Virginia Mccullough, JHONATAN      topiramate  200 mg Oral BID Anatoly Prajapati, DO      traZODone  50 mg Oral HS PRN JHONATAN Anne      warfarin  5 mg Oral Daily (warfarin) JHONATAN Berg         Risks / Benefits of Treatment:  Risks, benefits, and possible side effects of medications explained to patient and patient verbalizes understanding and agreement for treatment.    Counseling / Coordination of Care:  Patient's progress reviewed with nursing staff.  Medications, treatment  progress and treatment plan reviewed with patient.  Supportive counseling provided to the patient.    Total floor/unit time spent today 25 minutes. Greater than 50% of total time was spent with the patient and / or family counseling and / or coordination of care. A description of the counseling / coordination of care: medication education, treatment plan, supportive therapy.    This note was completed in part utilizing Dragon dictation Software. Grammatical, translation, syntax errors, random word insertions, spelling mistakes, and incomplete sentences may be an occasional consequence of this system secondary to software limitations with voice recognition, ambient noise, and hardware issues. If you have any questions or concerns about the content, text, or information contained within the body of this dictation, please contact the provider for clarification

## 2024-01-19 NOTE — PLAN OF CARE
Problem: Ineffective Coping  Goal: Identifies ineffective coping skills  Outcome: Progressing  Goal: Identifies healthy coping skills  Outcome: Progressing  Goal: Demonstrates healthy coping skills  Outcome: Progressing  Goal: Participates in unit activities  Description: Interventions:  - Provide therapeutic environment   - Provide required programming   - Redirect inappropriate behaviors   Outcome: Progressing  Goal: Patient/Family participate in treatment and DC plans  Description: Interventions:  - Provide therapeutic environment  Outcome: Progressing  Goal: Patient/Family verbalizes awareness of resources  Outcome: Progressing  Goal: Understands least restrictive measures  Description: Interventions:  - Utilize least restrictive behavior  Outcome: Progressing  Goal: Free from restraint events  Description: - Utilize least restrictive measures   - Provide behavioral interventions   - Redirect inappropriate behaviors   Outcome: Progressing     Problem: Risk for Self Injury/Neglect  Goal: Treatment Goal: Remain safe during length of stay, learn and adopt new coping skills, and be free of self-injurious ideation, impulses and acts at the time of discharge  Outcome: Progressing  Goal: Verbalize thoughts and feelings  Description: Interventions:  - Assess and re-assess patient's lethality and potential for self-injury  - Engage patient in 1:1 interactions, daily, for a minimum of 15 minutes  - Encourage patient to express feelings, fears, frustrations, hopes  - Establish rapport/trust with patient   Outcome: Progressing  Goal: Refrain from harming self  Description: Interventions:  - Monitor patient closely, per order  - Develop a trusting relationship  - Supervise medication ingestion, monitor effects and side effects   Outcome: Progressing  Goal: Recognize maladaptive responses and adopt new coping mechanisms  Outcome: Progressing  Goal: Complete daily ADLs, including personal hygiene independently, as  able  Description: Interventions:  - Observe, teach, and assist patient with ADLS  - Monitor and promote a balance of rest/activity, with adequate nutrition and elimination  Outcome: Progressing     Problem: Depression  Goal: Treatment Goal: Demonstrate behavioral control of depressive symptoms, verbalize feelings of improved mood/affect, and adopt new coping skills prior to discharge  Outcome: Progressing  Goal: Verbalize thoughts and feelings  Description: Interventions:  - Assess and re-assess patient's level of risk   - Engage patient in 1:1 interactions, daily, for a minimum of 15 minutes   - Encourage patient to express feelings, fears, frustrations, hopes   Outcome: Progressing  Goal: Refrain from harming self  Description: Interventions:  - Monitor patient closely, per order   - Supervise medication ingestion, monitor effects and side effects   Outcome: Progressing  Goal: Refrain from isolation  Description: Interventions:  - Develop a trusting relationship   - Encourage socialization   Outcome: Progressing  Goal: Refrain from self-neglect  Outcome: Progressing  Goal: Complete daily ADLs, including personal hygiene independently, as able  Description: Interventions:  - Observe, teach, and assist patient with ADLS  -  Monitor and promote a balance of rest/activity, with adequate nutrition and elimination   Outcome: Progressing     Problem: Anxiety  Goal: Anxiety is at manageable level  Description: Interventions:  - Assess and monitor patient's anxiety level.   - Monitor for signs and symptoms (heart palpitations, chest pain, shortness of breath, headaches, nausea, feeling jumpy, restlessness, irritable, apprehensive).   - Collaborate with interdisciplinary team and initiate plan and interventions as ordered.  - [place] patient to unit/surroundings  - Explain treatment plan  - Encourage participation in care  - Encourage verbalization of concerns/fears  - Identify coping mechanisms  - Assist in developing  anxiety-reducing skills  - Administer/offer alternative therapies  - Limit or eliminate stimulants  Outcome: Progressing     Problem: Risk for Violence/Aggression Toward Others  Goal: Treatment Goal: Refrain from acts of violence/aggression during length of stay, and demonstrate improved impulse control at the time of discharge  Outcome: Progressing  Goal: Verbalize thoughts and feelings  Description: Interventions:  - Assess and re-assess patient's level of risk, every waking shift  - Engage patient in 1:1 interactions, daily, for a minimum of 15 minutes   - Allow patient to express feelings and frustrations in a safe and non-threatening manner   - Establish rapport/trust with patient   Outcome: Progressing  Goal: Refrain from harming others  Outcome: Progressing  Goal: Refrain from destructive acts on the environment or property  Outcome: Progressing  Goal: Control angry outbursts  Description: Interventions:  - Monitor patient closely, per order  - Ensure early verbal de-escalation  - Monitor prn medication needs  - Set reasonable/therapeutic limits, outline behavioral expectations, and consequences   - Provide a non-threatening milieu, utilizing the least restrictive interventions   Outcome: Progressing  Goal: Identify appropriate positive anger management techniques  Description: Interventions:  - Offer anger management and coping skills groups   - Staff will provide positive feedback for appropriate anger control  Outcome: Progressing

## 2024-01-20 PROCEDURE — 99232 SBSQ HOSP IP/OBS MODERATE 35: CPT | Performed by: PSYCHIATRY & NEUROLOGY

## 2024-01-20 RX ADMIN — Medication 250 MG: at 08:02

## 2024-01-20 RX ADMIN — LEVOTHYROXINE SODIUM 125 MCG: 125 TABLET ORAL at 05:58

## 2024-01-20 RX ADMIN — CLONIDINE HYDROCHLORIDE 0.2 MG: 0.1 TABLET ORAL at 21:21

## 2024-01-20 RX ADMIN — NALTREXONE HYDROCHLORIDE 50 MG: 50 TABLET, FILM COATED ORAL at 08:02

## 2024-01-20 RX ADMIN — CYANOCOBALAMIN TAB 1000 MCG 1000 MCG: 1000 TAB at 08:02

## 2024-01-20 RX ADMIN — BUPROPION HYDROCHLORIDE 450 MG: 300 TABLET, EXTENDED RELEASE ORAL at 08:00

## 2024-01-20 RX ADMIN — OXCARBAZEPINE 450 MG: 150 TABLET, FILM COATED ORAL at 08:02

## 2024-01-20 RX ADMIN — OXYBUTYNIN CHLORIDE 5 MG: 5 TABLET ORAL at 08:05

## 2024-01-20 RX ADMIN — TOPIRAMATE 200 MG: 100 TABLET, FILM COATED ORAL at 17:02

## 2024-01-20 RX ADMIN — OXCARBAZEPINE 450 MG: 150 TABLET, FILM COATED ORAL at 21:21

## 2024-01-20 RX ADMIN — ATOMOXETINE 40 MG: 40 CAPSULE ORAL at 08:00

## 2024-01-20 RX ADMIN — LOSARTAN POTASSIUM 100 MG: 50 TABLET, FILM COATED ORAL at 08:03

## 2024-01-20 RX ADMIN — CLONIDINE HYDROCHLORIDE 0.2 MG: 0.1 TABLET ORAL at 08:03

## 2024-01-20 RX ADMIN — Medication 250 MG: at 17:02

## 2024-01-20 RX ADMIN — TOPIRAMATE 200 MG: 100 TABLET, FILM COATED ORAL at 08:02

## 2024-01-20 RX ADMIN — FUROSEMIDE 20 MG: 20 TABLET ORAL at 08:03

## 2024-01-20 RX ADMIN — PANTOPRAZOLE SODIUM 40 MG: 40 TABLET, DELAYED RELEASE ORAL at 05:58

## 2024-01-20 RX ADMIN — MELATONIN TAB 3 MG 3 MG: 3 TAB at 21:21

## 2024-01-20 RX ADMIN — WARFARIN SODIUM 5 MG: 5 TABLET ORAL at 17:02

## 2024-01-20 RX ADMIN — OXYBUTYNIN CHLORIDE 5 MG: 5 TABLET ORAL at 17:02

## 2024-01-20 NOTE — NURSING NOTE
Patient is medication and meal compliant.  No complaints of pain or discomfort. Patient is visible and social with peers on the unit. Patient endorses some depression and anxiety but reports being happy that she will be going home on February 1st. Patient denies SI, AH or VH.  Will continue to monitor patient for changes in mood or behavior.

## 2024-01-20 NOTE — PROGRESS NOTES
Progress Note - Behavioral Health     Blanca Mason 53 y.o. female MRN: @MRN   Unit/Bed#: [facility] 651-02 Encounter: [id number]    Per nursing report and sign out, patient can be immature, no acute issues    Blanca Mason reports today that she is doing well. Depression low, anxiety absent, eager to go home. No SI or H, no AVH or paranoia. Energy low but ok overall, sleep and appetite are good. Looking forward to reuniting with her cat.     Medication side effects: No   ROS: all other systems are negative    Mental Status Evaluation:    Appearance:  dressed casually   Behavior:  pleasant, cooperative, with good eye contact   Speech:  Normal volume, regular rate and rhythm   Mood:  euthymic   Affect:  mood congruent       Thought Process:  Linear and goal directed   Associations: intact associations   Thought Content:  normal and appropriate   Perceptual Disturbances: no auditory or visual hallcunations   Risk Potential: Suicidal ideation - None  Homicidal ideation - None  Potential for aggression - No   Sensorium:  Grossly oriented   Cognition:  grossly intact   Consciousness:  Alert & Awake   Memory:  recent and remote memory grossly intact   Attention: attention span and concentration are age appropriate           Insight:  fair   Judgment: fair   Muscle Strength  Muscle Tone normal  normal   Gait/Station: normal gait/station with good balance   Motor Activity: no abnormal movements       Vital signs in last 24 hours:    Temp:  [97.2 °F (36.2 °C)-97.6 °F (36.4 °C)] 97.6 °F (36.4 °C)  HR:  [68-82] 68  Resp:  [16] 16  BP: (119-150)/(63-95) 150/95    Laboratory results:  I have personally reviewed all pertinent laboratory/tests results.    Assessment/Plan   Principal Problem:    Schizoaffective disorder, bipolar type (HCC)  Active Problems:    Benign essential hypertension    Edema    Hypothyroidism    MAG (obstructive sleep apnea)    Overactive bladder    Sjogren's syndrome (HCC)    Medical clearance for  psychiatric admission    Anxiety    Borderline personality disorder (HCC)    History of pulmonary embolism    Ingrown toenail      Blanca Mason is making progress    Recommended Treatment:     Planned medication and treatment changes:    All current active medications have been reviewed.  Encourage group therapy, milieu therapy and occupational therapy  Behavioral Health checks as unit standard unless ordered or noted otherwise    Current Facility-Administered Medications   Medication Dose Route Frequency Provider Last Rate    aluminum-magnesium hydroxide-simethicone  30 mL Oral Q4H PRN Wendy Anderson, JHONATAN      atoMOXetine  40 mg Oral Daily Anatoly Prajapati, DO      buPROPion  450 mg Oral Daily Anatoly Prajapati, DO      cloNIDine  0.2 mg Oral Q12H TASHA Virginia Mccullough, CRNP      cyanocobalamin  1,000 mcg Oral Daily Dianna Mcrae, CRNP      cyanocobalamin  1,000 mcg Intramuscular Q30 Days Dianna Mcrae, CRNP      ergocalciferol  50,000 Units Oral Weekly Dianna Mcrae, CRNP      furosemide  20 mg Oral Daily Wendy Anderson, CRNP      hydrocortisone   Topical 4x Daily PRN Dianna Mcrae, CRNP      hydrOXYzine HCL  25 mg Oral Q6H PRN Max 4/day Virginia Mccullough, CRNP      hydrOXYzine HCL  50 mg Oral Q6H PRN Max 4/day Virginia Mccullough, CRNP      ibuprofen  200 mg Oral Q6H PRN Virginia Mccullough, CRNP      ibuprofen  400 mg Oral Q6H PRN Virginia Mccullough, CRNP      ibuprofen  600 mg Oral Q8H PRN Virginia Mccullough, CRNP      levothyroxine  125 mcg Oral Early Morning Wendy Anderson, CRNP      loperamide  2 mg Oral Q4H PRN Anatoly Prajapati, DO      LORazepam  1 mg Intramuscular Q6H PRN Max 3/day Virginia Mccullough, CRNP      LORazepam  1 mg Oral Q6H PRN Max 3/day Virginia Mccullough, LAURANP      losartan  100 mg Oral Daily Wendy Anderson, CRNP      melatonin  3 mg Oral HS Frederick Smith MD      naltrexone  50 mg Oral Daily Anatoly Prajapati, DO      OLANZapine  10 mg Oral Q6H PRN Frederick  MD Sarah      Or    OLANZapine  10 mg Intramuscular Q6H PRN Frederick Smith MD      OLANZapine  5 mg Oral Q6H PRN Frederick Smith MD      Or    OLANZapine  5 mg Intramuscular Q6H PRN Frederick Smith MD      OLANZapine  2.5 mg Oral Q3H PRN Frederick Smith MD      OXcarbazepine  450 mg Oral Q12H [facility] Frederick Smith MD      oxybutynin  5 mg Oral BID JHONATAN Fields      [START ON 1/31/2024] paliperidone  234 mg IM- Deltoid Q4 weeks Frederick Smith MD      pantoprazole  40 mg Oral Early Morning JHONATAN Fields      polyethylene glycol  17 g Oral Daily PRN JHONATAN Anne      saccharomyces boulardii  250 mg Oral BID Anatoly Prajapati DO      senna-docusate sodium  1 tablet Oral Daily PRN JHONATAN Anne      topiramate  200 mg Oral BID Anatoly Prajapati DO      traZODone  50 mg Oral HS PRN JHONATAN Anne      warfarin  5 mg Oral Daily (warfarin) JHONATAN Berg         1) continue current treatment  2) Continue to support patient and engage them in the programs available as feasible and appropriate. Continue case management support and therapy. Continue discharge planning.      Risks / Benefits of Treatment:    Risks, benefits, and possible side effects of medications explained to patient and patient verbalizes understanding and agreement for treatment.    Counseling / Coordination of Care:    Medication changes reviewed with nursing staff.  Medications, treatment progress and treatment plan reviewed with patient.      Norberto Thornton III, DO  1/20/2024  9:23 AM

## 2024-01-20 NOTE — NURSING NOTE
Patient visible on the unit for the majority of the shift sitting with peers in the dining room. Patient bright and pleasant on approach, currently denies depression, anxiety, SI/HI/AVH. Patient reporting excitement for discharge plan. Patient compliant with medications and meals, appetite good.  No further signs of distress noted.

## 2024-01-21 LAB
INR PPP: 2.66 (ref 0.84–1.19)
PROTHROMBIN TIME: 28.7 SECONDS (ref 11.6–14.5)

## 2024-01-21 PROCEDURE — 99232 SBSQ HOSP IP/OBS MODERATE 35: CPT | Performed by: PSYCHIATRY & NEUROLOGY

## 2024-01-21 PROCEDURE — 85610 PROTHROMBIN TIME: CPT | Performed by: NURSE PRACTITIONER

## 2024-01-21 RX ORDER — WARFARIN SODIUM 5 MG/1
5 TABLET ORAL
Status: COMPLETED | OUTPATIENT
Start: 2024-01-21 | End: 2024-01-28

## 2024-01-21 RX ADMIN — PANTOPRAZOLE SODIUM 40 MG: 40 TABLET, DELAYED RELEASE ORAL at 05:48

## 2024-01-21 RX ADMIN — TOPIRAMATE 200 MG: 100 TABLET, FILM COATED ORAL at 08:00

## 2024-01-21 RX ADMIN — CYANOCOBALAMIN TAB 1000 MCG 1000 MCG: 1000 TAB at 08:01

## 2024-01-21 RX ADMIN — TOPIRAMATE 200 MG: 100 TABLET, FILM COATED ORAL at 17:00

## 2024-01-21 RX ADMIN — BUPROPION HYDROCHLORIDE 450 MG: 300 TABLET, EXTENDED RELEASE ORAL at 08:00

## 2024-01-21 RX ADMIN — OXYBUTYNIN CHLORIDE 5 MG: 5 TABLET ORAL at 17:00

## 2024-01-21 RX ADMIN — CLONIDINE HYDROCHLORIDE 0.2 MG: 0.1 TABLET ORAL at 21:26

## 2024-01-21 RX ADMIN — NALTREXONE HYDROCHLORIDE 50 MG: 50 TABLET, FILM COATED ORAL at 08:01

## 2024-01-21 RX ADMIN — Medication 250 MG: at 17:00

## 2024-01-21 RX ADMIN — FUROSEMIDE 20 MG: 20 TABLET ORAL at 08:01

## 2024-01-21 RX ADMIN — WARFARIN SODIUM 5 MG: 5 TABLET ORAL at 17:00

## 2024-01-21 RX ADMIN — CLONIDINE HYDROCHLORIDE 0.2 MG: 0.1 TABLET ORAL at 08:01

## 2024-01-21 RX ADMIN — OXCARBAZEPINE 450 MG: 150 TABLET, FILM COATED ORAL at 08:00

## 2024-01-21 RX ADMIN — Medication 250 MG: at 08:00

## 2024-01-21 RX ADMIN — MELATONIN TAB 3 MG 3 MG: 3 TAB at 21:26

## 2024-01-21 RX ADMIN — OXCARBAZEPINE 450 MG: 150 TABLET, FILM COATED ORAL at 21:26

## 2024-01-21 RX ADMIN — OXYBUTYNIN CHLORIDE 5 MG: 5 TABLET ORAL at 08:04

## 2024-01-21 RX ADMIN — LEVOTHYROXINE SODIUM 125 MCG: 125 TABLET ORAL at 05:48

## 2024-01-21 RX ADMIN — LOSARTAN POTASSIUM 100 MG: 50 TABLET, FILM COATED ORAL at 08:01

## 2024-01-21 RX ADMIN — ATOMOXETINE 40 MG: 40 CAPSULE ORAL at 08:01

## 2024-01-21 NOTE — PROGRESS NOTES
Progress Note - Behavioral Health     Blanca Mason 53 y.o. female MRN: @MRN   Unit/Bed#: [facility] 651-02 Encounter: [id number]    Per nursing report and sign out, patient had no acute issues or changes, making progress    Blanca Mason reports today that she is doin well. Looking forward to discharge. Energy a bit low, but she slept well. Eating well. No SI, HI, AVH, paranoia. Depression low, anxiety absent but anxious to discharge    Medication side effects: No   ROS: all other systems are negative    Mental Status Evaluation:    Appearance:  age appropriate   Behavior:  pleasant, cooperative, with good eye contact   Speech:  Normal volume, regular rate and rhythm   Mood:  euthymic   Affect:  mood congruent       Thought Process:  Linear and goal directed   Associations: intact associations   Thought Content:  normal and appropriate   Perceptual Disturbances: no auditory or visual hallcunations   Risk Potential: Suicidal ideation - None  Homicidal ideation - None  Potential for aggression - No   Sensorium:  Grossly oriented   Cognition:  grossly intact   Consciousness:  Alert & Awake   Memory:  recent and remote memory grossly intact   Attention: attention span and concentration are age appropriate           Insight:  fair   Judgment: fair   Muscle Strength  Muscle Tone normal  normal   Gait/Station: normal gait/station with good balance   Motor Activity: no abnormal movements       Vital signs in last 24 hours:    Temp:  [97.2 °F (36.2 °C)-98.5 °F (36.9 °C)] 97.2 °F (36.2 °C)  HR:  [73-98] 73  Resp:  [16-18] 17  BP: (141-146)/(72-86) 146/78    Laboratory results:  I have personally reviewed all pertinent laboratory/tests results.    Assessment/Plan   Principal Problem:    Schizoaffective disorder, bipolar type (HCC)  Active Problems:    Benign essential hypertension    Edema    Hypothyroidism    MAG (obstructive sleep apnea)    Overactive bladder    Sjogren's syndrome (HCC)    Medical clearance for psychiatric  admission    Anxiety    Borderline personality disorder (HCC)    History of pulmonary embolism    Ingrown toenail      Blanca Mason is making progress    Recommended Treatment:     Planned medication and treatment changes:    All current active medications have been reviewed.  Encourage group therapy, milieu therapy and occupational therapy  Behavioral Health checks as unit standard unless ordered or noted otherwise    Current Facility-Administered Medications   Medication Dose Route Frequency Provider Last Rate    aluminum-magnesium hydroxide-simethicone  30 mL Oral Q4H PRN Wendy Anderson, JHONATAN      atoMOXetine  40 mg Oral Daily Anatoly Prajapati, DO      buPROPion  450 mg Oral Daily Anatoly Prajapati, DO      cloNIDine  0.2 mg Oral Q12H TASHA Virginia Mccullough, CRNP      cyanocobalamin  1,000 mcg Oral Daily Dianna Mcrae, CRNP      cyanocobalamin  1,000 mcg Intramuscular Q30 Days Dianna Mcrae, CRNP      ergocalciferol  50,000 Units Oral Weekly Dianna Mcrae, CRNP      furosemide  20 mg Oral Daily Wendy Anderson, CRNP      hydrocortisone   Topical 4x Daily PRN Dianna Mcrae, CRNP      hydrOXYzine HCL  25 mg Oral Q6H PRN Max 4/day Virginia Mccullough, CRNP      hydrOXYzine HCL  50 mg Oral Q6H PRN Max 4/day Virginia Mccullough, CRNP      ibuprofen  200 mg Oral Q6H PRN Virginia Mccullough, CRNP      ibuprofen  400 mg Oral Q6H PRN Virginia Mccullough, CRNP      ibuprofen  600 mg Oral Q8H PRN Virginia Mccullough, CRNP      levothyroxine  125 mcg Oral Early Morning Wendy Anderson, CRNP      loperamide  2 mg Oral Q4H PRN Anatoly Prajapati, DO      LORazepam  1 mg Intramuscular Q6H PRN Max 3/day Virginia Mccullough, CRNP      LORazepam  1 mg Oral Q6H PRN Max 3/day Virginia Mccullough, CRNP      losartan  100 mg Oral Daily Wendy Anderson, CRNP      melatonin  3 mg Oral HS Frederick Smith MD      naltrexone  50 mg Oral Daily Anatoly Prajapati, DO      OLANZapine  10 mg Oral Q6H PRN Frederick Smith,  MD      Or    OLANZapine  10 mg Intramuscular Q6H PRN Frederick Smith MD      OLANZapine  5 mg Oral Q6H PRN Ferderick Smith MD      Or    OLANZapine  5 mg Intramuscular Q6H PRN Frederick Smith MD      OLANZapine  2.5 mg Oral Q3H PRN Frederick Smith MD      OXcarbazepine  450 mg Oral Q12H [facility] Frederick Smith MD      oxybutynin  5 mg Oral BID JHONATAN Fields      [START ON 1/31/2024] paliperidone  234 mg IM- Deltoid Q4 weeks Frederick Smith MD      pantoprazole  40 mg Oral Early Morning JHONATAN Fields      polyethylene glycol  17 g Oral Daily PRN JHONATAN Anne      saccharomyces boulardii  250 mg Oral BID Anatoly Prajapati DO      senna-docusate sodium  1 tablet Oral Daily PRN JHONATAN nAne      topiramate  200 mg Oral BID Anatoly Prajapati DO      traZODone  50 mg Oral HS PRN JHONATAN Anne      warfarin  5 mg Oral Daily (warfarin) JHONATAN Berg         1) continue current treatment  2) Continue to support patient and engage them in the programs available as feasible and appropriate. Continue case management support and therapy. Continue discharge planning.      Risks / Benefits of Treatment:    Risks, benefits, and possible side effects of medications explained to patient and patient verbalizes understanding and agreement for treatment.    Counseling / Coordination of Care:    Medication changes reviewed with nursing staff.  Medications, treatment progress and treatment plan reviewed with patient.      Norberto Thornton III, DO  1/21/2024  2:14 PM

## 2024-01-21 NOTE — PLAN OF CARE
Problem: Risk for Self Injury/Neglect  Goal: Treatment Goal: Remain safe during length of stay, learn and adopt new coping skills, and be free of self-injurious ideation, impulses and acts at the time of discharge  Outcome: Progressing  Goal: Verbalize thoughts and feelings  Description: Interventions:  - Assess and re-assess patient's lethality and potential for self-injury  - Engage patient in 1:1 interactions, daily, for a minimum of 15 minutes  - Encourage patient to express feelings, fears, frustrations, hopes  - Establish rapport/trust with patient   Outcome: Progressing  Goal: Refrain from harming self  Description: Interventions:  - Monitor patient closely, per order  - Develop a trusting relationship  - Supervise medication ingestion, monitor effects and side effects   Outcome: Progressing  Goal: Attend and participate in unit activities, including therapeutic, recreational, and educational groups  Description: Interventions:  - Provide therapeutic and educational activities daily, encourage attendance and participation, and document same in the medical record  - Obtain collateral information, encourage visitation and family involvement in care   Outcome: Progressing  Goal: Recognize maladaptive responses and adopt new coping mechanisms  Outcome: Progressing  Goal: Complete daily ADLs, including personal hygiene independently, as able  Description: Interventions:  - Observe, teach, and assist patient with ADLS  - Monitor and promote a balance of rest/activity, with adequate nutrition and elimination  Outcome: Progressing     Problem: Depression  Goal: Treatment Goal: Demonstrate behavioral control of depressive symptoms, verbalize feelings of improved mood/affect, and adopt new coping skills prior to discharge  Outcome: Progressing  Goal: Verbalize thoughts and feelings  Description: Interventions:  - Assess and re-assess patient's level of risk   - Engage patient in 1:1 interactions, daily, for a minimum  of 15 minutes   - Encourage patient to express feelings, fears, frustrations, hopes   Outcome: Progressing  Goal: Refrain from harming self  Description: Interventions:  - Monitor patient closely, per order   - Supervise medication ingestion, monitor effects and side effects   Outcome: Progressing  Goal: Refrain from isolation  Description: Interventions:  - Develop a trusting relationship   - Encourage socialization   Outcome: Progressing  Goal: Refrain from self-neglect  Outcome: Progressing  Goal: Attend and participate in unit activities, including therapeutic, recreational, and educational groups  Description: Interventions:  - Provide therapeutic and educational activities daily, encourage attendance and participation, and document same in the medical record   Outcome: Progressing  Goal: Complete daily ADLs, including personal hygiene independently, as able  Description: Interventions:  - Observe, teach, and assist patient with ADLS  -  Monitor and promote a balance of rest/activity, with adequate nutrition and elimination   Outcome: Progressing     Problem: Anxiety  Goal: Anxiety is at manageable level  Description: Interventions:  - Assess and monitor patient's anxiety level.   - Monitor for signs and symptoms (heart palpitations, chest pain, shortness of breath, headaches, nausea, feeling jumpy, restlessness, irritable, apprehensive).   - Collaborate with interdisciplinary team and initiate plan and interventions as ordered.  - [place] patient to unit/surroundings  - Explain treatment plan  - Encourage participation in care  - Encourage verbalization of concerns/fears  - Identify coping mechanisms  - Assist in developing anxiety-reducing skills  - Administer/offer alternative therapies  - Limit or eliminate stimulants  Outcome: Progressing     Problem: Risk for Violence/Aggression Toward Others  Goal: Treatment Goal: Refrain from acts of violence/aggression during length of stay, and demonstrate improved  impulse control at the time of discharge  Outcome: Progressing  Goal: Verbalize thoughts and feelings  Description: Interventions:  - Assess and re-assess patient's level of risk, every waking shift  - Engage patient in 1:1 interactions, daily, for a minimum of 15 minutes   - Allow patient to express feelings and frustrations in a safe and non-threatening manner   - Establish rapport/trust with patient   Outcome: Progressing  Goal: Refrain from harming others  Outcome: Progressing  Goal: Refrain from destructive acts on the environment or property  Outcome: Progressing  Goal: Control angry outbursts  Description: Interventions:  - Monitor patient closely, per order  - Ensure early verbal de-escalation  - Monitor prn medication needs  - Set reasonable/therapeutic limits, outline behavioral expectations, and consequences   - Provide a non-threatening milieu, utilizing the least restrictive interventions   Outcome: Progressing  Goal: Attend and participate in unit activities, including therapeutic, recreational, and educational groups  Description: Interventions:  - Provide therapeutic and educational activities daily, encourage attendance and participation, and document same in the medical record   Outcome: Progressing  Goal: Identify appropriate positive anger management techniques  Description: Interventions:  - Offer anger management and coping skills groups   - Staff will provide positive feedback for appropriate anger control  Outcome: Progressing     Problem: DISCHARGE PLANNING - CARE MANAGEMENT  Goal: Discharge to post-acute care or home with appropriate resources  Description: INTERVENTIONS:  - Conduct assessment to determine patient/family and health care team treatment goals, and need for post-acute services based on payer coverage, community resources, and patient preferences, and barriers to discharge  - Address psychosocial, clinical, and financial barriers to discharge as identified in assessment in  conjunction with the patient/family and health care team  - Arrange appropriate level of post-acute services according to patient’s   needs and preference and payer coverage in collaboration with the physician and health care team  - Communicate with and update the patient/family, physician, and health care team regarding progress on the discharge plan  - Arrange appropriate transportation to post-acute venues  Outcome: Progressing     Problem: SAFETY, RESTRAINT - VIOLENT/SELF-DESTRUCTIVE  Goal: Remains free of harm/injury from restraints (Restraint for Violent/Self-Destructive Behavior)  Description: INTERVENTIONS:  - Instruct patient/family regarding restraint use   - Assess and monitor physiologic and psychological status   - Provide interventions and comfort measures to meet assessed patient needs   - Ensure continuous in person monitoring is provided   - Identify and implement measures to help patient regain control  - Assess readiness for release of restraint  Outcome: Progressing  Goal: Returns to optimal restraint-free functioning  Description: INTERVENTIONS:  - Assess the patient's behavior and symptoms that indicate continued need for restraint  - Identify and implement measures to help patient regain control  - Assess readiness for release of restraint   Outcome: Progressing

## 2024-01-21 NOTE — NURSING NOTE
Patient is medication and meal compliant. No complaints of pain or discomfort. Patient denies symptoms and is looking forward to discharge on February 1st.  Will continue to monitor patient for changes in mood or behavior.

## 2024-01-22 PROCEDURE — 99232 SBSQ HOSP IP/OBS MODERATE 35: CPT | Performed by: STUDENT IN AN ORGANIZED HEALTH CARE EDUCATION/TRAINING PROGRAM

## 2024-01-22 RX ADMIN — ERGOCALCIFEROL 50000 UNITS: 1.25 CAPSULE ORAL at 08:00

## 2024-01-22 RX ADMIN — WARFARIN SODIUM 5 MG: 5 TABLET ORAL at 17:00

## 2024-01-22 RX ADMIN — OXCARBAZEPINE 450 MG: 150 TABLET, FILM COATED ORAL at 08:00

## 2024-01-22 RX ADMIN — FUROSEMIDE 20 MG: 20 TABLET ORAL at 08:02

## 2024-01-22 RX ADMIN — PANTOPRAZOLE SODIUM 40 MG: 40 TABLET, DELAYED RELEASE ORAL at 06:04

## 2024-01-22 RX ADMIN — TOPIRAMATE 200 MG: 100 TABLET, FILM COATED ORAL at 17:00

## 2024-01-22 RX ADMIN — CYANOCOBALAMIN TAB 1000 MCG 1000 MCG: 1000 TAB at 08:04

## 2024-01-22 RX ADMIN — TOPIRAMATE 200 MG: 100 TABLET, FILM COATED ORAL at 08:00

## 2024-01-22 RX ADMIN — NALTREXONE HYDROCHLORIDE 50 MG: 50 TABLET, FILM COATED ORAL at 08:00

## 2024-01-22 RX ADMIN — OXCARBAZEPINE 450 MG: 150 TABLET, FILM COATED ORAL at 21:09

## 2024-01-22 RX ADMIN — OXYBUTYNIN CHLORIDE 5 MG: 5 TABLET ORAL at 17:00

## 2024-01-22 RX ADMIN — OXYBUTYNIN CHLORIDE 5 MG: 5 TABLET ORAL at 08:01

## 2024-01-22 RX ADMIN — Medication 250 MG: at 08:00

## 2024-01-22 RX ADMIN — MELATONIN TAB 3 MG 3 MG: 3 TAB at 21:10

## 2024-01-22 RX ADMIN — LEVOTHYROXINE SODIUM 125 MCG: 125 TABLET ORAL at 06:04

## 2024-01-22 RX ADMIN — BUPROPION HYDROCHLORIDE 450 MG: 300 TABLET, EXTENDED RELEASE ORAL at 08:00

## 2024-01-22 RX ADMIN — CLONIDINE HYDROCHLORIDE 0.2 MG: 0.1 TABLET ORAL at 08:02

## 2024-01-22 RX ADMIN — Medication 250 MG: at 17:00

## 2024-01-22 RX ADMIN — LOSARTAN POTASSIUM 100 MG: 50 TABLET, FILM COATED ORAL at 08:02

## 2024-01-22 RX ADMIN — CLONIDINE HYDROCHLORIDE 0.2 MG: 0.1 TABLET ORAL at 21:09

## 2024-01-22 RX ADMIN — ATOMOXETINE 40 MG: 40 CAPSULE ORAL at 08:00

## 2024-01-22 NOTE — PLAN OF CARE
Problem: Risk for Self Injury/Neglect  Goal: Treatment Goal: Remain safe during length of stay, learn and adopt new coping skills, and be free of self-injurious ideation, impulses and acts at the time of discharge  Outcome: Progressing  Goal: Verbalize thoughts and feelings  Description: Interventions:  - Assess and re-assess patient's lethality and potential for self-injury  - Engage patient in 1:1 interactions, daily, for a minimum of 15 minutes  - Encourage patient to express feelings, fears, frustrations, hopes  - Establish rapport/trust with patient   Outcome: Progressing  Goal: Refrain from harming self  Description: Interventions:  - Monitor patient closely, per order  - Develop a trusting relationship  - Supervise medication ingestion, monitor effects and side effects   Outcome: Progressing  Goal: Attend and participate in unit activities, including therapeutic, recreational, and educational groups  Description: Interventions:  - Provide therapeutic and educational activities daily, encourage attendance and participation, and document same in the medical record  - Obtain collateral information, encourage visitation and family involvement in care   Outcome: Progressing  Goal: Recognize maladaptive responses and adopt new coping mechanisms  Outcome: Progressing  Goal: Complete daily ADLs, including personal hygiene independently, as able  Description: Interventions:  - Observe, teach, and assist patient with ADLS  - Monitor and promote a balance of rest/activity, with adequate nutrition and elimination  Outcome: Progressing     Problem: Depression  Goal: Treatment Goal: Demonstrate behavioral control of depressive symptoms, verbalize feelings of improved mood/affect, and adopt new coping skills prior to discharge  Outcome: Progressing  Goal: Verbalize thoughts and feelings  Description: Interventions:  - Assess and re-assess patient's level of risk   - Engage patient in 1:1 interactions, daily, for a minimum  of 15 minutes   - Encourage patient to express feelings, fears, frustrations, hopes   Outcome: Progressing  Goal: Refrain from harming self  Description: Interventions:  - Monitor patient closely, per order   - Supervise medication ingestion, monitor effects and side effects   Outcome: Progressing  Goal: Refrain from isolation  Description: Interventions:  - Develop a trusting relationship   - Encourage socialization   Outcome: Progressing  Goal: Refrain from self-neglect  Outcome: Progressing  Goal: Attend and participate in unit activities, including therapeutic, recreational, and educational groups  Description: Interventions:  - Provide therapeutic and educational activities daily, encourage attendance and participation, and document same in the medical record   Outcome: Progressing  Goal: Complete daily ADLs, including personal hygiene independently, as able  Description: Interventions:  - Observe, teach, and assist patient with ADLS  -  Monitor and promote a balance of rest/activity, with adequate nutrition and elimination   Outcome: Progressing     Problem: Anxiety  Goal: Anxiety is at manageable level  Description: Interventions:  - Assess and monitor patient's anxiety level.   - Monitor for signs and symptoms (heart palpitations, chest pain, shortness of breath, headaches, nausea, feeling jumpy, restlessness, irritable, apprehensive).   - Collaborate with interdisciplinary team and initiate plan and interventions as ordered.  - [place] patient to unit/surroundings  - Explain treatment plan  - Encourage participation in care  - Encourage verbalization of concerns/fears  - Identify coping mechanisms  - Assist in developing anxiety-reducing skills  - Administer/offer alternative therapies  - Limit or eliminate stimulants  Outcome: Progressing     Problem: Risk for Violence/Aggression Toward Others  Goal: Treatment Goal: Refrain from acts of violence/aggression during length of stay, and demonstrate improved  impulse control at the time of discharge  Outcome: Progressing  Goal: Verbalize thoughts and feelings  Description: Interventions:  - Assess and re-assess patient's level of risk, every waking shift  - Engage patient in 1:1 interactions, daily, for a minimum of 15 minutes   - Allow patient to express feelings and frustrations in a safe and non-threatening manner   - Establish rapport/trust with patient   Outcome: Progressing  Goal: Refrain from harming others  Outcome: Progressing  Goal: Refrain from destructive acts on the environment or property  Outcome: Progressing  Goal: Control angry outbursts  Description: Interventions:  - Monitor patient closely, per order  - Ensure early verbal de-escalation  - Monitor prn medication needs  - Set reasonable/therapeutic limits, outline behavioral expectations, and consequences   - Provide a non-threatening milieu, utilizing the least restrictive interventions   Outcome: Progressing  Goal: Attend and participate in unit activities, including therapeutic, recreational, and educational groups  Description: Interventions:  - Provide therapeutic and educational activities daily, encourage attendance and participation, and document same in the medical record   Outcome: Progressing  Goal: Identify appropriate positive anger management techniques  Description: Interventions:  - Offer anger management and coping skills groups   - Staff will provide positive feedback for appropriate anger control  Outcome: Progressing     Problem: DISCHARGE PLANNING - CARE MANAGEMENT  Goal: Discharge to post-acute care or home with appropriate resources  Description: INTERVENTIONS:  - Conduct assessment to determine patient/family and health care team treatment goals, and need for post-acute services based on payer coverage, community resources, and patient preferences, and barriers to discharge  - Address psychosocial, clinical, and financial barriers to discharge as identified in assessment in  conjunction with the patient/family and health care team  - Arrange appropriate level of post-acute services according to patient’s   needs and preference and payer coverage in collaboration with the physician and health care team  - Communicate with and update the patient/family, physician, and health care team regarding progress on the discharge plan  - Arrange appropriate transportation to post-acute venues  Outcome: Progressing     Problem: SAFETY, RESTRAINT - VIOLENT/SELF-DESTRUCTIVE  Goal: Remains free of harm/injury from restraints (Restraint for Violent/Self-Destructive Behavior)  Description: INTERVENTIONS:  - Instruct patient/family regarding restraint use   - Assess and monitor physiologic and psychological status   - Provide interventions and comfort measures to meet assessed patient needs   - Ensure continuous in person monitoring is provided   - Identify and implement measures to help patient regain control  - Assess readiness for release of restraint  Outcome: Progressing  Goal: Returns to optimal restraint-free functioning  Description: INTERVENTIONS:  - Assess the patient's behavior and symptoms that indicate continued need for restraint  - Identify and implement measures to help patient regain control  - Assess readiness for release of restraint   Outcome: Progressing

## 2024-01-22 NOTE — PROGRESS NOTES
Pt attended all groups. Crisis, warmline, Gamblers Anonymous, DELIA, [facility] and [phone] numbers reviewed.  Pt completed relapse prevention plan and aware of d/c needs in Feb.     01/22/24 1000   Activity/Group Checklist   Group Community meeting   Attendance Attended   Attendance Duration (min) 46-60   Interactions Interacted appropriately   Affect/Mood Appropriate   Goals Achieved Identified feelings;Discussed coping strategies;Able to listen to others;Able to engage in interactions;Able to reflect/comment on own behavior;Able to manage/cope with feelings;Verbalized increased hopefulness;Able to self-disclose;Able to recieve feedback;Discussed self-esteem issues;Increased hopefulness;Identified relapse prevention strategies

## 2024-01-22 NOTE — NURSING NOTE
Patient is medication and meal compliant. No complaints of pain or discomfort. Patient is visible and social with select peers on the unit. No complaints of pain or discomfort. Patient denies symptoms of depression or anxiety and is looking forward to discharge on February 1st. Patient denies SI,AH or VH. Will continue to monitor patient for changes in mood or behavior.

## 2024-01-22 NOTE — PROGRESS NOTES
Progress Note - Behavioral Health   Blanca Mason 53 y.o. female MRN: [id number]  Unit/Bed#: [facility] 651-02 Encounter: [id number]    Patient was seen today for continuation of care, records reviewed and patient was discussed with the morning case review team.    Blanca was seen today for psychiatric follow-up.  On assessment today, Blanca was pleasant, and more upbeat.  Patient states that she continues to attend groups and is working on her coping skills to deal with daily frustrations.  Discharge was set for 2/1/2024 with follow-up DBT services, patient states that she is optimistic about upcoming therapy and looks forward to being able to go home.  No medication changes to be made at this time, patient continues to tolerate all changes well.  Invega to be given on 1/31/2024 prior to discharge, patient continues to exhibit improving insight and judgment.  Discharge disposition ongoing    Blanca denies acute suicidal/self-harm ideation/intent/plan upon direct inquiry at this time.  Blanca remains behaviorally appropriate, no agitation or aggression noted on exam or in report.  Blanca also denies HI/AH/VH, and does not appear overtly manic.  No overt delusions or paranoia are verbalized. Impulse control is intact.  Blanca remains adherent to her current psychotropic medication regimen and denies any side effects from medications, as well as none noted on exam.    Vitals:  Vitals:    01/22/24 0740   BP: 118/69   Pulse: 72   Resp: 16   Temp: (!) 97.1 °F (36.2 °C)   SpO2: 96%       Laboratory Results:  I have personally reviewed all pertinent laboratory/tests results.  Most Recent Labs:   Lab Results   Component Value Date    WBC 8.96 12/02/2023    RBC 4.34 12/02/2023    HGB 12.9 12/02/2023    HCT 40.5 12/02/2023     12/02/2023    RDW 14.2 12/02/2023    TOTANEUTABS 4.73 11/19/2023    NEUTROABS 5.77 12/02/2023    SODIUM 139 12/02/2023    K 3.8 12/02/2023     12/02/2023    CO2 21 12/02/2023    BUN 13 12/02/2023     CREATININE 0.62 12/02/2023    GLUC 107 12/02/2023    GLUF 107 (H) 12/02/2023    CALCIUM 8.7 12/02/2023    AST 13 12/02/2023    ALT 13 12/02/2023    ALKPHOS 55 12/02/2023    TP 6.0 (L) 12/02/2023    ALB 3.5 12/02/2023    TBILI 0.25 12/02/2023    CHOLESTEROL 234 (H) 03/09/2023    HDL 71 03/09/2023    TRIG 153 (H) 03/09/2023    LDLCALC 132 (H) 03/09/2023    NONHDLC 163 03/09/2023    VALPROICTOT 23 (L) 11/10/2023    AMMONIA 63 11/06/2023    [id number] 4.324 12/02/2023    FREET4 0.81 11/06/2023    T3FREE 2.27 (L) 10/16/2019    PREGUR negative 07/13/2022    PREGSERUM Negative 11/06/2023    RPR Non-Reactive 07/17/2022    HGBA1C 5.0 01/05/2023    EAG 97 01/05/2023       Psychiatric Review of Systems:  Behavior over the last 24 hours:  unchanged.   Sleep: good  Appetite: good  Medication side effects: none  ROS: no complaints, denies shortness of breath or chest pain and all other systems are negative for acute changes    Mental Status Evaluation:  Appearance:  adequate grooming   Behavior:  cooperative, calm   Speech:  normal rate and volume   Mood:  still at times irritable   Affect:  constricted   Thought Process:  linear   Thought Content:  no overt delusions   Perceptual Disturbances: denies auditory hallucinations when asked, does not appear responding to internal stimuli   Risk Potential: Suicidal ideation - None  Homicidal ideation - None  Potential for aggression - No   Memory:  recent and remote memory grossly intact   Sensorium  person, place, and time/date      Consciousness:  alert and awake   Attention: attention span and concentration are age appropriate   Insight:  moderate   Judgment: moderate   Gait/Station: normal gait/station   Motor Activity: no abnormal movements   Progress Toward Goals:   Blanca is progressing towards goals of inpatient psychiatric treatment by continued medication compliance and is attending therapeutic modalities on the milieu. However, the patient continues to require inpatient  psychiatric hospitalization for continued medication management and titration to optimize symptom reduction, improve sleep hygiene, and demonstrate adequate self-care.    Assessment/Plan   Principal Problem:    Schizoaffective disorder, bipolar type (HCC)  Active Problems:    Benign essential hypertension    Edema    Hypothyroidism    MAG (obstructive sleep apnea)    Overactive bladder    Sjogren's syndrome (HCC)    Medical clearance for psychiatric admission    Anxiety    Borderline personality disorder (HCC)    History of pulmonary embolism    Ingrown toenail      Recommended Treatment: Treatment plan and medication changes discussed and per the attending physician the plan is:    1.Continue with group therapy, milieu therapy and occupational therapy  2.Behavioral Health checks every 7 minutes  3.Continue frequent safety checks and vitals per unit protocol  4.Continue with [facility] medical management as indicated  5.Continue with current medication regimen  6.Will review labs in the a.m.  7.Disposition Planning: Discharge planning and efforts remain ongoing    Behavioral Health Medications: all current active meds have been reviewed and continue current psychiatric medications.  Current Facility-Administered Medications   Medication Dose Route Frequency Provider Last Rate    aluminum-magnesium hydroxide-simethicone  30 mL Oral Q4H PRN JHONATAN Fields      atoMOXetine  40 mg Oral Daily Anatoly Prajapati, DO      buPROPion  450 mg Oral Daily Anatoly Prajapati, DO      cloNIDine  0.2 mg Oral Q12H [facility] JHONATAN Anne      cyanocobalamin  1,000 mcg Oral Daily JHONATAN Berg      cyanocobalamin  1,000 mcg Intramuscular Q30 Days JHONATAN Berg      furosemide  20 mg Oral Daily JHONATAN Fields      hydrocortisone   Topical 4x Daily PRN JHONATAN Berg      hydrOXYzine HCL  25 mg Oral Q6H PRN Max 4/day JHONATAN Anne      hydrOXYzine HCL  50 mg Oral Q6H PRN  Max 4/day Virginia Mccullough, CRNP      ibuprofen  200 mg Oral Q6H PRN Virginia Mccullough, CRNP      ibuprofen  400 mg Oral Q6H PRN Virginia Mccullough, CRNP      ibuprofen  600 mg Oral Q8H PRN Virginia Mccullough, CRELKIN      levothyroxine  125 mcg Oral Early Morning Wendy Anderson, CRNP      loperamide  2 mg Oral Q4H PRN Anatoly Prajapati, DO      LORazepam  1 mg Intramuscular Q6H PRN Max 3/day Virginia Mccullough, CRNP      LORazepam  1 mg Oral Q6H PRN Max 3/day Virginia Mccullough, CRNP      losartan  100 mg Oral Daily Wendy Anderson, CRNP      melatonin  3 mg Oral HS Frederick Smith MD      naltrexone  50 mg Oral Daily Anatoly Prajapati, DO      OLANZapine  10 mg Oral Q6H PRN Frederick Smith MD      Or    OLANZapine  10 mg Intramuscular Q6H PRN Frederick Smith MD      OLANZapine  5 mg Oral Q6H PRN Frederick Smith MD      Or    OLANZapine  5 mg Intramuscular Q6H PRN Frederick Smith MD      OLANZapine  2.5 mg Oral Q3H PRN Frederick Smith MD      OXcarbazepine  450 mg Oral Q12H [facility] Frederick Smith MD      oxybutynin  5 mg Oral BID JHONATAN Fields      [START ON 1/31/2024] paliperidone  234 mg IM- Deltoid Q4 weeks Frederick Smith MD      pantoprazole  40 mg Oral Early Morning JHONATAN Fields      polyethylene glycol  17 g Oral Daily PRN JHONATAN Anne      saccharomyces boulardii  250 mg Oral BID Anatoly Prajapati, DO      senna-docusate sodium  1 tablet Oral Daily PRN Virginia Mccullough, JHONATAN      topiramate  200 mg Oral BID Anatoly Prajapati, DO      traZODone  50 mg Oral HS PRN JHONATAN Anne      warfarin  5 mg Oral Daily (warfarin) JHONATAN Berg         Risks / Benefits of Treatment:  Risks, benefits, and possible side effects of medications explained to patient and patient verbalizes understanding and agreement for treatment.    Counseling / Coordination of Care:  Patient's progress reviewed with nursing staff.  Medications, treatment progress and treatment plan reviewed with  patient.  Supportive counseling provided to the patient.    Total floor/unit time spent today 25 minutes. Greater than 50% of total time was spent with the patient and / or family counseling and / or coordination of care. A description of the counseling / coordination of care: medication education, treatment plan, supportive therapy.    This note was completed in part utilizing Dragon dictation Software. Grammatical, translation, syntax errors, random word insertions, spelling mistakes, and incomplete sentences may be an occasional consequence of this system secondary to software limitations with voice recognition, ambient noise, and hardware issues. If you have any questions or concerns about the content, text, or information contained within the body of this dictation, please contact the provider for clarification

## 2024-01-22 NOTE — PROGRESS NOTES
01/22/24 0811   Team Meeting   Meeting Type Daily Rounds   Initial Conference Date 01/22/24   Next Conference Date 01/23/24   Team Members Present   Team Members Present Physician;Nurse;;;Occupational Therapist   Physician Team Member Dr Smith, Dr Quispe, SHEFALI Leon   Nursing Team Member Aram   Care Management Team Member Atiya   Social Work Team Member Dalia TALBOT Team Member Yeison   Patient/Family Present   Patient Present No   Patient's Family Present No     Add Caroline - Pharmacy:  discharge 2/1/24, happy, doing well, [place] for 1/31/24.

## 2024-01-23 PROCEDURE — 99232 SBSQ HOSP IP/OBS MODERATE 35: CPT | Performed by: STUDENT IN AN ORGANIZED HEALTH CARE EDUCATION/TRAINING PROGRAM

## 2024-01-23 RX ADMIN — ATOMOXETINE 40 MG: 40 CAPSULE ORAL at 08:08

## 2024-01-23 RX ADMIN — PANTOPRAZOLE SODIUM 40 MG: 40 TABLET, DELAYED RELEASE ORAL at 05:57

## 2024-01-23 RX ADMIN — CYANOCOBALAMIN TAB 1000 MCG 1000 MCG: 1000 TAB at 08:08

## 2024-01-23 RX ADMIN — OXCARBAZEPINE 450 MG: 150 TABLET, FILM COATED ORAL at 08:08

## 2024-01-23 RX ADMIN — TOPIRAMATE 200 MG: 100 TABLET, FILM COATED ORAL at 17:21

## 2024-01-23 RX ADMIN — WARFARIN SODIUM 5 MG: 5 TABLET ORAL at 17:21

## 2024-01-23 RX ADMIN — NALTREXONE HYDROCHLORIDE 50 MG: 50 TABLET, FILM COATED ORAL at 08:08

## 2024-01-23 RX ADMIN — TOPIRAMATE 200 MG: 100 TABLET, FILM COATED ORAL at 08:08

## 2024-01-23 RX ADMIN — LOSARTAN POTASSIUM 100 MG: 50 TABLET, FILM COATED ORAL at 08:08

## 2024-01-23 RX ADMIN — OXCARBAZEPINE 450 MG: 150 TABLET, FILM COATED ORAL at 21:01

## 2024-01-23 RX ADMIN — CLONIDINE HYDROCHLORIDE 0.2 MG: 0.1 TABLET ORAL at 08:08

## 2024-01-23 RX ADMIN — Medication 250 MG: at 08:08

## 2024-01-23 RX ADMIN — MELATONIN TAB 3 MG 3 MG: 3 TAB at 21:01

## 2024-01-23 RX ADMIN — BUPROPION HYDROCHLORIDE 450 MG: 300 TABLET, EXTENDED RELEASE ORAL at 08:08

## 2024-01-23 RX ADMIN — LEVOTHYROXINE SODIUM 125 MCG: 125 TABLET ORAL at 05:57

## 2024-01-23 RX ADMIN — FUROSEMIDE 20 MG: 20 TABLET ORAL at 08:09

## 2024-01-23 RX ADMIN — OXYBUTYNIN CHLORIDE 5 MG: 5 TABLET ORAL at 17:21

## 2024-01-23 RX ADMIN — Medication 250 MG: at 17:22

## 2024-01-23 RX ADMIN — OXYBUTYNIN CHLORIDE 5 MG: 5 TABLET ORAL at 08:08

## 2024-01-23 RX ADMIN — CLONIDINE HYDROCHLORIDE 0.2 MG: 0.1 TABLET ORAL at 21:01

## 2024-01-23 NOTE — PLAN OF CARE
Problem: Risk for Self Injury/Neglect  Goal: Treatment Goal: Remain safe during length of stay, learn and adopt new coping skills, and be free of self-injurious ideation, impulses and acts at the time of discharge  Outcome: Progressing  Goal: Verbalize thoughts and feelings  Description: Interventions:  - Assess and re-assess patient's lethality and potential for self-injury  - Engage patient in 1:1 interactions, daily, for a minimum of 15 minutes  - Encourage patient to express feelings, fears, frustrations, hopes  - Establish rapport/trust with patient   Outcome: Progressing  Goal: Refrain from harming self  Description: Interventions:  - Monitor patient closely, per order  - Develop a trusting relationship  - Supervise medication ingestion, monitor effects and side effects   Outcome: Progressing  Goal: Attend and participate in unit activities, including therapeutic, recreational, and educational groups  Description: Interventions:  - Provide therapeutic and educational activities daily, encourage attendance and participation, and document same in the medical record  - Obtain collateral information, encourage visitation and family involvement in care   Outcome: Progressing  Goal: Recognize maladaptive responses and adopt new coping mechanisms  Outcome: Progressing  Goal: Complete daily ADLs, including personal hygiene independently, as able  Description: Interventions:  - Observe, teach, and assist patient with ADLS  - Monitor and promote a balance of rest/activity, with adequate nutrition and elimination  Outcome: Progressing     Problem: Depression  Goal: Treatment Goal: Demonstrate behavioral control of depressive symptoms, verbalize feelings of improved mood/affect, and adopt new coping skills prior to discharge  Outcome: Progressing  Goal: Verbalize thoughts and feelings  Description: Interventions:  - Assess and re-assess patient's level of risk   - Engage patient in 1:1 interactions, daily, for a minimum  of 15 minutes   - Encourage patient to express feelings, fears, frustrations, hopes   Outcome: Progressing  Goal: Refrain from harming self  Description: Interventions:  - Monitor patient closely, per order   - Supervise medication ingestion, monitor effects and side effects   Outcome: Progressing  Goal: Refrain from isolation  Description: Interventions:  - Develop a trusting relationship   - Encourage socialization   Outcome: Progressing  Goal: Refrain from self-neglect  Outcome: Progressing  Goal: Attend and participate in unit activities, including therapeutic, recreational, and educational groups  Description: Interventions:  - Provide therapeutic and educational activities daily, encourage attendance and participation, and document same in the medical record   Outcome: Progressing  Goal: Complete daily ADLs, including personal hygiene independently, as able  Description: Interventions:  - Observe, teach, and assist patient with ADLS  -  Monitor and promote a balance of rest/activity, with adequate nutrition and elimination   Outcome: Progressing     Problem: Anxiety  Goal: Anxiety is at manageable level  Description: Interventions:  - Assess and monitor patient's anxiety level.   - Monitor for signs and symptoms (heart palpitations, chest pain, shortness of breath, headaches, nausea, feeling jumpy, restlessness, irritable, apprehensive).   - Collaborate with interdisciplinary team and initiate plan and interventions as ordered.  - [place] patient to unit/surroundings  - Explain treatment plan  - Encourage participation in care  - Encourage verbalization of concerns/fears  - Identify coping mechanisms  - Assist in developing anxiety-reducing skills  - Administer/offer alternative therapies  - Limit or eliminate stimulants  Outcome: Progressing     Problem: Risk for Violence/Aggression Toward Others  Goal: Treatment Goal: Refrain from acts of violence/aggression during length of stay, and demonstrate improved  impulse control at the time of discharge  Outcome: Progressing  Goal: Verbalize thoughts and feelings  Description: Interventions:  - Assess and re-assess patient's level of risk, every waking shift  - Engage patient in 1:1 interactions, daily, for a minimum of 15 minutes   - Allow patient to express feelings and frustrations in a safe and non-threatening manner   - Establish rapport/trust with patient   Outcome: Progressing  Goal: Refrain from harming others  Outcome: Progressing  Goal: Refrain from destructive acts on the environment or property  Outcome: Progressing  Goal: Control angry outbursts  Description: Interventions:  - Monitor patient closely, per order  - Ensure early verbal de-escalation  - Monitor prn medication needs  - Set reasonable/therapeutic limits, outline behavioral expectations, and consequences   - Provide a non-threatening milieu, utilizing the least restrictive interventions   Outcome: Progressing  Goal: Attend and participate in unit activities, including therapeutic, recreational, and educational groups  Description: Interventions:  - Provide therapeutic and educational activities daily, encourage attendance and participation, and document same in the medical record   Outcome: Progressing  Goal: Identify appropriate positive anger management techniques  Description: Interventions:  - Offer anger management and coping skills groups   - Staff will provide positive feedback for appropriate anger control  Outcome: Progressing     Problem: DISCHARGE PLANNING - CARE MANAGEMENT  Goal: Discharge to post-acute care or home with appropriate resources  Description: INTERVENTIONS:  - Conduct assessment to determine patient/family and health care team treatment goals, and need for post-acute services based on payer coverage, community resources, and patient preferences, and barriers to discharge  - Address psychosocial, clinical, and financial barriers to discharge as identified in assessment in  conjunction with the patient/family and health care team  - Arrange appropriate level of post-acute services according to patient’s   needs and preference and payer coverage in collaboration with the physician and health care team  - Communicate with and update the patient/family, physician, and health care team regarding progress on the discharge plan  - Arrange appropriate transportation to post-acute venues  Outcome: Progressing     Problem: SAFETY, RESTRAINT - VIOLENT/SELF-DESTRUCTIVE  Goal: Remains free of harm/injury from restraints (Restraint for Violent/Self-Destructive Behavior)  Description: INTERVENTIONS:  - Instruct patient/family regarding restraint use   - Assess and monitor physiologic and psychological status   - Provide interventions and comfort measures to meet assessed patient needs   - Ensure continuous in person monitoring is provided   - Identify and implement measures to help patient regain control  - Assess readiness for release of restraint  Outcome: Progressing  Goal: Returns to optimal restraint-free functioning  Description: INTERVENTIONS:  - Assess the patient's behavior and symptoms that indicate continued need for restraint  - Identify and implement measures to help patient regain control  - Assess readiness for release of restraint   Outcome: Progressing

## 2024-01-23 NOTE — CASE MANAGEMENT
Spoke to patient, she wanted to show this writer a picture that she was coloring. She spoke about her cat and how excited that she is to be going home on 2/1/24.

## 2024-01-23 NOTE — NURSING NOTE
Pt present in milieu. Pleasant and cooperative. Appetite good. Denies SI/HI. Q 7 minute checks maintained.

## 2024-01-23 NOTE — PROGRESS NOTES
Pt attended group and did not engage when prompted.      01/23/24 1300   Activity/Group Checklist   Group Other (Comment)  (poetry and positive reflection (DELIA poem))   Attendance Attended   Attendance Duration (min) 46-60   Interactions Did not interact   Affect/Mood Constricted   Goals Achieved Identified feelings;Discussed coping strategies;Able to listen to others

## 2024-01-23 NOTE — NURSING NOTE
Patient was visible and social with select peers in the milieu. Denies all psych s/s. No compliant offered. No behaviors noted. Had 75/100/100% for all three meals. Took her medications. Use CPAP at night. Safety checks ongoing.

## 2024-01-23 NOTE — PROGRESS NOTES
Progress Note - Behavioral Health   Blanca Mason 53 y.o. female MRN: [id number]  Unit/Bed#: [facility] 651-02 Encounter: [id number]    Patient was seen today for continuation of care, records reviewed and patient was discussed with the morning case review team.    Blanca was seen today for psychiatric follow-up.  On assessment today, Blanca was seen in the day room.  Social with select peers, patient reports no depression or anxiety.  Optimistic about discharge, patient questioned today about coping mechanisms and ways of managing frustration in the community.  Able to verbalize proper coping skills as well as steps to reduce her anxiety, patient also reports that she looks forward to DBT to assist with symptoms.  No medication changes to be made at this time.  Invega Sustenna to be given on 1/31/2024.  Discharge disposition ongoing with plan discharge on 2/1/2024 with ACT team follow-up.    Blanca denies acute suicidal/self-harm ideation/intent/plan upon direct inquiry at this time.  Blanca remains behaviorally appropriate, no agitation or aggression noted on exam or in report.  Blanca also denies HI/AH/VH, and does not appear overtly manic.  No overt delusions or paranoia are verbalized. Impulse control is intact.  Blanca remains adherent to her current psychotropic medication regimen and denies any side effects from medications, as well as none noted on exam.    Vitals:  Vitals:    01/23/24 0745   BP: 139/76   Pulse: 64   Resp: 16   Temp: 97.5 °F (36.4 °C)   SpO2: 93%       Laboratory Results:  I have personally reviewed all pertinent laboratory/tests results.  Most Recent Labs:   Lab Results   Component Value Date    WBC 8.96 12/02/2023    RBC 4.34 12/02/2023    HGB 12.9 12/02/2023    HCT 40.5 12/02/2023     12/02/2023    RDW 14.2 12/02/2023    TOTANEUTABS 4.73 11/19/2023    NEUTROABS 5.77 12/02/2023    SODIUM 139 12/02/2023    K 3.8 12/02/2023     12/02/2023    CO2 21 12/02/2023    BUN 13 12/02/2023     CREATININE 0.62 12/02/2023    GLUC 107 12/02/2023    GLUF 107 (H) 12/02/2023    CALCIUM 8.7 12/02/2023    AST 13 12/02/2023    ALT 13 12/02/2023    ALKPHOS 55 12/02/2023    TP 6.0 (L) 12/02/2023    ALB 3.5 12/02/2023    TBILI 0.25 12/02/2023    CHOLESTEROL 234 (H) 03/09/2023    HDL 71 03/09/2023    TRIG 153 (H) 03/09/2023    LDLCALC 132 (H) 03/09/2023    NONHDLC 163 03/09/2023    VALPROICTOT 23 (L) 11/10/2023    AMMONIA 63 11/06/2023    [id number] 4.324 12/02/2023    FREET4 0.81 11/06/2023    T3FREE 2.27 (L) 10/16/2019    PREGUR negative 07/13/2022    PREGSERUM Negative 11/06/2023    RPR Non-Reactive 07/17/2022    HGBA1C 5.0 01/05/2023    EAG 97 01/05/2023       Psychiatric Review of Systems:  Behavior over the last 24 hours:  unchanged.   Sleep: good  Appetite: good  Medication side effects: none  ROS: no complaints, denies shortness of breath or chest pain and all other systems are negative for acute changes    Mental Status Evaluation:  Appearance:  dressed appropriately, adequate grooming   Behavior:  cooperative, calm   Speech:  normal rate and volume   Mood:  anxious, still at times irritable   Affect:  constricted   Thought Process:  linear   Thought Content:  no overt delusions   Perceptual Disturbances: denies auditory hallucinations when asked, does not appear responding to internal stimuli   Risk Potential: Suicidal ideation - None  Homicidal ideation - None  Potential for aggression - No   Memory:  recent and remote memory grossly intact   Sensorium  person, place, and situation      Consciousness:  alert and awake   Attention: attention span and concentration are age appropriate   Insight:  improving   Judgment: improving   Gait/Station: normal gait/station   Motor Activity: no abnormal movements   Progress Toward Goals:   Blanca is progressing towards goals of inpatient psychiatric treatment by continued medication compliance and is attending therapeutic modalities on the milieu. However, the patient  continues to require inpatient psychiatric hospitalization for continued medication management and titration to optimize symptom reduction, improve sleep hygiene, and demonstrate adequate self-care.    Assessment/Plan   Principal Problem:    Schizoaffective disorder, bipolar type (HCC)  Active Problems:    Benign essential hypertension    Edema    Hypothyroidism    MAG (obstructive sleep apnea)    Overactive bladder    Sjogren's syndrome (HCC)    Medical clearance for psychiatric admission    Anxiety    Borderline personality disorder (HCC)    History of pulmonary embolism    Ingrown toenail      Recommended Treatment: Treatment plan and medication changes discussed and per the attending physician the plan is:    1.Continue with group therapy, milieu therapy and occupational therapy  2.Behavioral Health checks every 7 minutes  3.Continue frequent safety checks and vitals per unit protocol  4.Continue with SLIM medical management as indicated  5.Continue with current medication regimen  6.Will review labs in the a.m.  7.Disposition Planning: Discharge planning and efforts remain ongoing    Behavioral Health Medications: all current active meds have been reviewed and continue current psychiatric medications.  Current Facility-Administered Medications   Medication Dose Route Frequency Provider Last Rate    aluminum-magnesium hydroxide-simethicone  30 mL Oral Q4H PRN JHONATAN Fields      atoMOXetine  40 mg Oral Daily Anatoly Prajapati,       buPROPion  450 mg Oral Daily Anatoly Prajapati,       cloNIDine  0.2 mg Oral Q12H [facility] JHONATAN Anne      cyanocobalamin  1,000 mcg Oral Daily JHONATAN Berg      cyanocobalamin  1,000 mcg Intramuscular Q30 Days JHONATAN Berg      furosemide  20 mg Oral Daily JHONATAN Fields      hydrocortisone   Topical 4x Daily PRN JHONATAN Berg      hydrOXYzine HCL  25 mg Oral Q6H PRN Max 4/day JHONATAN Anne       hydrOXYzine HCL  50 mg Oral Q6H PRN Max 4/day Virginia Mccullough, JHONATAN      ibuprofen  200 mg Oral Q6H PRN Virginia Mccullough, CRNP      ibuprofen  400 mg Oral Q6H PRN Virginia Mccullough, CRNP      ibuprofen  600 mg Oral Q8H PRN Virginia Mccullough, CRELKIN      levothyroxine  125 mcg Oral Early Morning Wendy Anderson, JHONATAN      loperamide  2 mg Oral Q4H PRN Anatoly Prajapati, DO      LORazepam  1 mg Intramuscular Q6H PRN Max 3/day Virginia Mccullough, JHONATAN      LORazepam  1 mg Oral Q6H PRN Max 3/day Virginia Mccullough, JHONATAN      losartan  100 mg Oral Daily Wendy Anderson, JHONATAN      melatonin  3 mg Oral HS Frederick Smith MD      naltrexone  50 mg Oral Daily Anatoly Prajapati, DO      OLANZapine  10 mg Oral Q6H PRN Frederick Smith MD      Or    OLANZapine  10 mg Intramuscular Q6H PRN Frederick Smith MD      OLANZapine  5 mg Oral Q6H PRN Frederick Smith MD      Or    OLANZapine  5 mg Intramuscular Q6H PRN Frederick Smith MD      OLANZapine  2.5 mg Oral Q3H PRN Frederick Smith MD      OXcarbazepine  450 mg Oral Q12H TASHA Frederick Smith MD      oxybutynin  5 mg Oral BID JHONATAN Fields      [START ON 1/31/2024] paliperidone  234 mg IM- Deltoid Q4 weeks Frederick Smith MD      pantoprazole  40 mg Oral Early Morning JHONATAN Fields      polyethylene glycol  17 g Oral Daily PRN Virginia Mccullough, JHONATAN      saccharomyces boulardii  250 mg Oral BID Anatoly Prajapati, DO      senna-docusate sodium  1 tablet Oral Daily PRN Virginia Mccullough, JHONATAN      topiramate  200 mg Oral BID Anatoly Prajapati, DO      traZODone  50 mg Oral HS PRN JHONATAN Anne      warfarin  5 mg Oral Daily (warfarin) JHONATAN Berg         Risks / Benefits of Treatment:  Risks, benefits, and possible side effects of medications explained to patient and patient verbalizes understanding and agreement for treatment.    Counseling / Coordination of Care:  Patient's progress reviewed with nursing staff.  Medications, treatment  progress and treatment plan reviewed with patient.  Supportive counseling provided to the patient.    Total floor/unit time spent today 25 minutes. Greater than 50% of total time was spent with the patient and / or family counseling and / or coordination of care. A description of the counseling / coordination of care: medication education, treatment plan, supportive therapy.    This note was completed in part utilizing Dragon dictation Software. Grammatical, translation, syntax errors, random word insertions, spelling mistakes, and incomplete sentences may be an occasional consequence of this system secondary to software limitations with voice recognition, ambient noise, and hardware issues. If you have any questions or concerns about the content, text, or information contained within the body of this dictation, please contact the provider for clarification

## 2024-01-23 NOTE — PROGRESS NOTES
01/23/24 0805   Team Meeting   Meeting Type Daily Rounds   Initial Conference Date 01/23/24   Next Conference Date 01/24/24   Team Members Present   Team Members Present Physician;Nurse;;;Occupational Therapist   Physician Team Member Dr Smith, SHEFALI Tony   Nursing Team Member Jennifer   Care Management Team Member Atiya   Social Work Team Member Dalia TALBOT Team Member Yeison   Patient/Family Present   Patient Present No   Patient's Family Present No     SNOWDEN on 31st, discharge on 2/1/24.

## 2024-01-24 PROCEDURE — 99232 SBSQ HOSP IP/OBS MODERATE 35: CPT | Performed by: STUDENT IN AN ORGANIZED HEALTH CARE EDUCATION/TRAINING PROGRAM

## 2024-01-24 RX ADMIN — OXYBUTYNIN CHLORIDE 5 MG: 5 TABLET ORAL at 09:03

## 2024-01-24 RX ADMIN — TOPIRAMATE 200 MG: 100 TABLET, FILM COATED ORAL at 08:54

## 2024-01-24 RX ADMIN — OXYBUTYNIN CHLORIDE 5 MG: 5 TABLET ORAL at 17:27

## 2024-01-24 RX ADMIN — ATOMOXETINE 40 MG: 40 CAPSULE ORAL at 08:55

## 2024-01-24 RX ADMIN — HYDROXYZINE HYDROCHLORIDE 50 MG: 50 TABLET, FILM COATED ORAL at 10:37

## 2024-01-24 RX ADMIN — LOSARTAN POTASSIUM 100 MG: 50 TABLET, FILM COATED ORAL at 08:55

## 2024-01-24 RX ADMIN — WARFARIN SODIUM 5 MG: 5 TABLET ORAL at 17:27

## 2024-01-24 RX ADMIN — CYANOCOBALAMIN TAB 1000 MCG 1000 MCG: 1000 TAB at 08:56

## 2024-01-24 RX ADMIN — LEVOTHYROXINE SODIUM 125 MCG: 125 TABLET ORAL at 06:00

## 2024-01-24 RX ADMIN — NALTREXONE HYDROCHLORIDE 50 MG: 50 TABLET, FILM COATED ORAL at 08:55

## 2024-01-24 RX ADMIN — OXCARBAZEPINE 450 MG: 150 TABLET, FILM COATED ORAL at 21:46

## 2024-01-24 RX ADMIN — OXCARBAZEPINE 450 MG: 150 TABLET, FILM COATED ORAL at 08:55

## 2024-01-24 RX ADMIN — TRAZODONE HYDROCHLORIDE 50 MG: 50 TABLET ORAL at 01:39

## 2024-01-24 RX ADMIN — BUPROPION HYDROCHLORIDE 450 MG: 300 TABLET, EXTENDED RELEASE ORAL at 08:55

## 2024-01-24 RX ADMIN — FUROSEMIDE 20 MG: 20 TABLET ORAL at 08:55

## 2024-01-24 RX ADMIN — PANTOPRAZOLE SODIUM 40 MG: 40 TABLET, DELAYED RELEASE ORAL at 06:01

## 2024-01-24 RX ADMIN — TOPIRAMATE 200 MG: 100 TABLET, FILM COATED ORAL at 17:26

## 2024-01-24 RX ADMIN — Medication 250 MG: at 08:56

## 2024-01-24 RX ADMIN — CLONIDINE HYDROCHLORIDE 0.2 MG: 0.1 TABLET ORAL at 08:54

## 2024-01-24 RX ADMIN — MELATONIN TAB 3 MG 3 MG: 3 TAB at 21:45

## 2024-01-24 RX ADMIN — Medication 250 MG: at 17:27

## 2024-01-24 NOTE — TREATMENT TEAM
01/24/24 1100   Activity/Group Checklist   Group Life Skills  (vitality group on breathing relaxation.)   Attendance Attended   Attendance Duration (min) 31-45   Interactions Interacted appropriately  (Pt. continues to explore thought of life outside the hospital with some enthusism.)   Affect/Mood Appropriate;Bright;Normal range   Goals Achieved Able to engage in interactions;Able to listen to others;Discussed coping strategies

## 2024-01-24 NOTE — TREATMENT TEAM
"Pt attended all groups.  Pt bright, spontaneous and cooperative.  Pt aware of others and not just self.  Pt asking \"How are you?\" today.  Pt described her feelings as \" appreciative, hopeful and joyful\".  Pt hopeful about d.c and plans.      01/24/24 1330   Activity/Group Checklist   Group Other (Comment)  (Healthy vs unhealthy relationships and boundaries)   Attendance Attended   Attendance Duration (min) 46-60   Interactions Interacted appropriately   Affect/Mood Bright   Goals Achieved Identified feelings;Discussed coping strategies;Able to listen to others;Able to manage/cope with feelings;Verbalized increased hopefulness;Able to reflect/comment on own behavior;Able to engage in interactions;Able to self-disclose;Able to recieve feedback;Displayed empathy;Discussed self-esteem issues            "

## 2024-01-24 NOTE — PLAN OF CARE
Problem: Ineffective Coping  Goal: Identifies ineffective coping skills  Outcome: Progressing  Goal: Identifies healthy coping skills  Outcome: Progressing  Goal: Demonstrates healthy coping skills  Outcome: Progressing  Goal: Participates in unit activities  Description: Interventions:  - Provide therapeutic environment   - Provide required programming   - Redirect inappropriate behaviors   Outcome: Progressing  Goal: Patient/Family participate in treatment and DC plans  Description: Interventions:  - Provide therapeutic environment  Outcome: Progressing  Goal: Patient/Family verbalizes awareness of resources  Outcome: Progressing  Goal: Understands least restrictive measures  Description: Interventions:  - Utilize least restrictive behavior  Outcome: Progressing  Goal: Free from restraint events  Description: - Utilize least restrictive measures   - Provide behavioral interventions   - Redirect inappropriate behaviors   Outcome: Progressing

## 2024-01-24 NOTE — PROGRESS NOTES
Progress Note - Behavioral Health   Blanca Mason 53 y.o. female MRN: [id number]  Unit/Bed#: [facility] 651-02 Encounter: [id number]    Patient was seen today for continuation of care, records reviewed and patient was discussed with the morning case review team.    Blanca was seen today for psychiatric follow-up.  On assessment today, Blanca was pleasant contemplative.  Stating that she is anxious about discharge, patient verbalizes that she is preparing herself for DBT therapy and is ready to conquer the unknown.  No depression or anxiety currently verbalized.  Patient continues to attend groups and is noted to be social with select peers.  Invega Sustenna to be given on 1/31/2024, patient will also continue with current medication regimen including Trileptal for mood stabilization.  Insight and judgment into current mental illness improving, discharge disposition ongoing with patient following with ACT team and therapy services to assist with positive outcome in the community.    Blanca denies acute suicidal/self-harm ideation/intent/plan upon direct inquiry at this time.  Blanca remains behaviorally appropriate, no agitation or aggression noted on exam or in report.  Blanca also denies HI/AH/VH, and does not appear overtly manic.  No overt delusions or paranoia are verbalized. Impulse control is intact.  Blanca remains adherent to his current psychotropic medication regimen and denies any side effects from medications, as well as none noted on exam.    Vitals:  Vitals:    01/24/24 0744   BP: 127/80   Pulse: 70   Resp: 18   Temp: 97.5 °F (36.4 °C)   SpO2: 94%       Laboratory Results:  I have personally reviewed all pertinent laboratory/tests results.  Most Recent Labs:   Lab Results   Component Value Date    WBC 8.96 12/02/2023    RBC 4.34 12/02/2023    HGB 12.9 12/02/2023    HCT 40.5 12/02/2023     12/02/2023    RDW 14.2 12/02/2023    TOTANEUTABS 4.73 11/19/2023    NEUTROABS 5.77 12/02/2023    SODIUM 139 12/02/2023    K  3.8 12/02/2023     12/02/2023    CO2 21 12/02/2023    BUN 13 12/02/2023    CREATININE 0.62 12/02/2023    GLUC 107 12/02/2023    GLUF 107 (H) 12/02/2023    CALCIUM 8.7 12/02/2023    AST 13 12/02/2023    ALT 13 12/02/2023    ALKPHOS 55 12/02/2023    TP 6.0 (L) 12/02/2023    ALB 3.5 12/02/2023    TBILI 0.25 12/02/2023    CHOLESTEROL 234 (H) 03/09/2023    HDL 71 03/09/2023    TRIG 153 (H) 03/09/2023    LDLCALC 132 (H) 03/09/2023    NONHDLC 163 03/09/2023    VALPROICTOT 23 (L) 11/10/2023    AMMONIA 63 11/06/2023    [id number] 4.324 12/02/2023    FREET4 0.81 11/06/2023    T3FREE 2.27 (L) 10/16/2019    PREGUR negative 07/13/2022    PREGSERUM Negative 11/06/2023    RPR Non-Reactive 07/17/2022    HGBA1C 5.0 01/05/2023    EAG 97 01/05/2023       Psychiatric Review of Systems:  Behavior over the last 24 hours:  unchanged.   Sleep: good  Appetite: good  Medication side effects: none  ROS: no complaints, denies shortness of breath or chest pain and all other systems are negative for acute changes    Mental Status Evaluation:  Appearance:  dressed appropriately, adequate grooming   Behavior:  cooperative, calm   Speech:  normal rate and volume   Mood:  anxious   Affect:  constricted   Thought Process:  linear   Thought Content:  no overt delusions   Perceptual Disturbances: denies auditory hallucinations when asked, does not appear responding to internal stimuli   Risk Potential: Suicidal ideation - None  Homicidal ideation - None  Potential for aggression - No   Memory:  recent and remote memory grossly intact   Sensorium  person, place, and time/date      Consciousness:  alert and awake   Attention: attention span and concentration are age appropriate   Insight:  improving   Judgment: improving   Gait/Station: normal gait/station   Motor Activity: no abnormal movements   Progress Toward Goals:   Blanca is progressing towards goals of inpatient psychiatric treatment by continued medication compliance and is attending  therapeutic modalities on the milieu. However, the patient continues to require inpatient psychiatric hospitalization for continued medication management and titration to optimize symptom reduction, improve sleep hygiene, and demonstrate adequate self-care.    Assessment/Plan   Principal Problem:    Schizoaffective disorder, bipolar type (HCC)  Active Problems:    Benign essential hypertension    Edema    Hypothyroidism    MAG (obstructive sleep apnea)    Overactive bladder    Sjogren's syndrome (HCC)    Medical clearance for psychiatric admission    Anxiety    Borderline personality disorder (HCC)    History of pulmonary embolism    Ingrown toenail      Recommended Treatment: Treatment plan and medication changes discussed and per the attending physician the plan is:    1.Continue with group therapy, milieu therapy and occupational therapy  2.Behavioral Health checks every 7 minutes  3.Continue frequent safety checks and vitals per unit protocol  4.Continue with SLIM medical management as indicated  5.Continue with current medication regimen  6.Will review labs in the a.m.  7.Disposition Planning: Discharge planning and efforts remain ongoing    Behavioral Health Medications: all current active meds have been reviewed and continue current psychiatric medications.  Current Facility-Administered Medications   Medication Dose Route Frequency Provider Last Rate    aluminum-magnesium hydroxide-simethicone  30 mL Oral Q4H PRN JHONATAN Fields      atoMOXetine  40 mg Oral Daily Anatoly Prajapati DO      buPROPion  450 mg Oral Daily Anatoly Prajapati DO      cloNIDine  0.2 mg Oral Q12H JHONATAN Martinez      cyanocobalamin  1,000 mcg Oral Daily JHONATAN Berg      cyanocobalamin  1,000 mcg Intramuscular Q30 Days JHONATAN Berg      furosemide  20 mg Oral Daily JHONATAN Fields      hydrocortisone   Topical 4x Daily PRN JHONATAN Berg      hydrOXYzine HCL   25 mg Oral Q6H PRN Max 4/day Virginia Mccullough, CRNP      hydrOXYzine HCL  50 mg Oral Q6H PRN Max 4/day Virginia Mccullough, LAURANP      ibuprofen  200 mg Oral Q6H PRN Virginia Mccullough, CRNP      ibuprofen  400 mg Oral Q6H PRN Virginia Mccullough, CRNP      ibuprofen  600 mg Oral Q8H PRN Virginia Mccullough, CRNP      levothyroxine  125 mcg Oral Early Morning Wendy Anderson, JHONATAN      loperamide  2 mg Oral Q4H PRN Anatoly Prajapati, DO      LORazepam  1 mg Intramuscular Q6H PRN Max 3/day Virginia Mccullough, CRNP      LORazepam  1 mg Oral Q6H PRN Max 3/day Virginia Mccullough, JHONATAN      losartan  100 mg Oral Daily Wendy Anderson, LAURANP      melatonin  3 mg Oral HS Frederick Smith MD      naltrexone  50 mg Oral Daily Anatoly Prajapati, DO      OLANZapine  10 mg Oral Q6H PRN Frederick Smith MD      Or    OLANZapine  10 mg Intramuscular Q6H PRN Frederick Smith MD      OLANZapine  5 mg Oral Q6H PRN Frederick Smith MD      Or    OLANZapine  5 mg Intramuscular Q6H PRN Frederick Smith MD      OLANZapine  2.5 mg Oral Q3H PRN Frederick Smith MD      OXcarbazepine  450 mg Oral Q12H [facility] Frederick Smith MD      oxybutynin  5 mg Oral BID JHONATAN Fields      [START ON 1/31/2024] paliperidone  234 mg IM- Deltoid Q4 weeks Frederick Smith MD      pantoprazole  40 mg Oral Early Morning JHONATAN Fields      polyethylene glycol  17 g Oral Daily PRN Virginia Mccullough, JHONATAN      saccharomyces boulardii  250 mg Oral BID Anatoly Prajapati, DO      senna-docusate sodium  1 tablet Oral Daily PRN Virginia Mccullough, JHONATAN      topiramate  200 mg Oral BID Anatoly Prajapati, DO      traZODone  50 mg Oral HS PRN Virginia Mccullough, JHONATAN      warfarin  5 mg Oral Daily (warfarin) JHONATAN Berg         Risks / Benefits of Treatment:  Risks, benefits, and possible side effects of medications explained to patient and patient verbalizes understanding and agreement for treatment.    Counseling / Coordination of Care:  Patient's progress  reviewed with nursing staff.  Medications, treatment progress and treatment plan reviewed with patient.  Supportive counseling provided to the patient.    Total floor/unit time spent today 25 minutes. Greater than 50% of total time was spent with the patient and / or family counseling and / or coordination of care. A description of the counseling / coordination of care: medication education, treatment plan, supportive therapy.    This note was completed in part utilizing Dragon dictation Software. Grammatical, translation, syntax errors, random word insertions, spelling mistakes, and incomplete sentences may be an occasional consequence of this system secondary to software limitations with voice recognition, ambient noise, and hardware issues. If you have any questions or concerns about the content, text, or information contained within the body of this dictation, please contact the provider for clarification

## 2024-01-24 NOTE — PROGRESS NOTES
01/24/24 0817   Team Meeting   Meeting Type Daily Rounds   Initial Conference Date 01/24/24   Next Conference Date 01/25/24   Team Members Present   Team Members Present Physician;Nurse;;;Occupational Therapist   Physician Team Member Dr Smith, Dr Epps, SHEFALI Tony   Nursing Team Member Jennifer   Care Management Team Member Atiya   Social Work Team Member Dalia TALBOT Team Member Yeison   Patient/Family Present   Patient Present No   Patient's Family Present No     Discharge 2/1/24, doing well

## 2024-01-24 NOTE — NURSING NOTE
Patient awake at 0130 to use bathroom, unable to go back to sleep.  Medicated with prn trazodone; medication effective.

## 2024-01-24 NOTE — NURSING NOTE
No behaviors noted, patient is calm, visible, social, and pleasant on approach. Patient is medication and meals compliant. Safety checks ongoing.

## 2024-01-24 NOTE — TREATMENT TEAM
01/24/24 1037   Dillon Anxiety Scale   Anxious Mood 3   Tension 3   Fears 3   Insomnia 0   Intellectual 3   Depressed Mood 0   Somatic Complaints: Muscular 0   Somatic Complaints: Sensory 0   Cardiovascular Symptoms 0   Respiratory Symptoms 0   Gastrointestinal Symptoms 0   Genitourinary Symptoms 0   Autonomic Symptoms 3   Behavior at Interview 3   Dillon Anxiety Score 18     Patient is c/o anxiety, due to d/c soon. Patient reports having tremors, and can't focus. PRN Atarax given.

## 2024-01-25 PROCEDURE — 99232 SBSQ HOSP IP/OBS MODERATE 35: CPT | Performed by: STUDENT IN AN ORGANIZED HEALTH CARE EDUCATION/TRAINING PROGRAM

## 2024-01-25 RX ADMIN — NALTREXONE HYDROCHLORIDE 50 MG: 50 TABLET, FILM COATED ORAL at 08:09

## 2024-01-25 RX ADMIN — CYANOCOBALAMIN TAB 1000 MCG 1000 MCG: 1000 TAB at 08:10

## 2024-01-25 RX ADMIN — BUPROPION HYDROCHLORIDE 450 MG: 300 TABLET, EXTENDED RELEASE ORAL at 08:09

## 2024-01-25 RX ADMIN — CLONIDINE HYDROCHLORIDE 0.2 MG: 0.1 TABLET ORAL at 21:49

## 2024-01-25 RX ADMIN — Medication 250 MG: at 08:09

## 2024-01-25 RX ADMIN — WARFARIN SODIUM 5 MG: 5 TABLET ORAL at 17:14

## 2024-01-25 RX ADMIN — TOPIRAMATE 200 MG: 100 TABLET, FILM COATED ORAL at 08:09

## 2024-01-25 RX ADMIN — FUROSEMIDE 20 MG: 20 TABLET ORAL at 08:09

## 2024-01-25 RX ADMIN — OXCARBAZEPINE 450 MG: 150 TABLET, FILM COATED ORAL at 08:09

## 2024-01-25 RX ADMIN — OXCARBAZEPINE 450 MG: 150 TABLET, FILM COATED ORAL at 21:49

## 2024-01-25 RX ADMIN — PANTOPRAZOLE SODIUM 40 MG: 40 TABLET, DELAYED RELEASE ORAL at 05:30

## 2024-01-25 RX ADMIN — OXYBUTYNIN CHLORIDE 5 MG: 5 TABLET ORAL at 17:14

## 2024-01-25 RX ADMIN — ATOMOXETINE 40 MG: 40 CAPSULE ORAL at 08:10

## 2024-01-25 RX ADMIN — LOSARTAN POTASSIUM 100 MG: 50 TABLET, FILM COATED ORAL at 08:09

## 2024-01-25 RX ADMIN — MELATONIN TAB 3 MG 3 MG: 3 TAB at 21:49

## 2024-01-25 RX ADMIN — OXYBUTYNIN CHLORIDE 5 MG: 5 TABLET ORAL at 08:14

## 2024-01-25 RX ADMIN — CLONIDINE HYDROCHLORIDE 0.2 MG: 0.1 TABLET ORAL at 08:09

## 2024-01-25 RX ADMIN — Medication 250 MG: at 17:14

## 2024-01-25 RX ADMIN — TOPIRAMATE 200 MG: 100 TABLET, FILM COATED ORAL at 17:14

## 2024-01-25 RX ADMIN — LEVOTHYROXINE SODIUM 125 MCG: 125 TABLET ORAL at 05:30

## 2024-01-25 NOTE — TREATMENT TEAM
01/25/24 1000   Activity/Group Checklist   Group Community meeting   Attendance Attended   Attendance Duration (min) 31-45   Interactions Interacted appropriately   Affect/Mood Bright;Appropriate   Goals Achieved Identified feelings;Identified relapse prevention strategies;Increased hopefulness;Discussed coping strategies;Able to listen to others;Able to engage in interactions;Able to manage/cope with feelings;Able to reflect/comment on own behavior;Verbalized increased hopefulness;Able to self-disclose

## 2024-01-25 NOTE — PROGRESS NOTES
01/25/24 0758   Team Meeting   Meeting Type Daily Rounds   Initial Conference Date 01/25/24   Next Conference Date 01/26/24   Team Members Present   Team Members Present Physician;Nurse;;;Occupational Therapist   Physician Team Member Dr Smith, Dr Epps, SHEFALI Walsh J. Stives   Nursing Team Member Jennifer   Care Management Team Member Atiya   Social Work Team Member Dalia TALBOT Team Member Yeison   Patient/Family Present   Patient Present No   Patient's Family Present No     Discharge on 2/1/24. Anxious about coloring a bible.

## 2024-01-25 NOTE — PROGRESS NOTES
01/25/24 1330   Activity/Group Checklist   Group Life Skills  (self awarenss topic)   Attendance Attended   Attendance Duration (min) 46-60   Interactions Interacted appropriately  (Pt. jovial yet minimal expression of how the topic related to self.)   Affect/Mood Normal range   Goals Achieved Able to listen to others;Able to engage in interactions;Discussed coping strategies

## 2024-01-25 NOTE — NURSING NOTE
Patient denies all psych s/s. at the moment. Patient is calm, visible, and social. No PRN requested. Patient is medications and meals compliant.

## 2024-01-25 NOTE — PLAN OF CARE
Problem: Ineffective Coping  Goal: Identifies healthy coping skills  Outcome: Progressing  Goal: Demonstrates healthy coping skills  Outcome: Progressing  Goal: Participates in unit activities  Description: Interventions:  - Provide therapeutic environment   - Provide required programming   - Redirect inappropriate behaviors   Outcome: Progressing  Goal: Patient/Family verbalizes awareness of resources  Outcome: Progressing  Goal: Free from restraint events  Description: - Utilize least restrictive measures   - Provide behavioral interventions   - Redirect inappropriate behaviors   Outcome: Progressing     Problem: Risk for Self Injury/Neglect  Goal: Verbalize thoughts and feelings  Description: Interventions:  - Assess and re-assess patient's lethality and potential for self-injury  - Engage patient in 1:1 interactions, daily, for a minimum of 15 minutes  - Encourage patient to express feelings, fears, frustrations, hopes  - Establish rapport/trust with patient   Outcome: Progressing  Goal: Refrain from harming self  Description: Interventions:  - Monitor patient closely, per order  - Develop a trusting relationship  - Supervise medication ingestion, monitor effects and side effects   Outcome: Progressing  Goal: Attend and participate in unit activities, including therapeutic, recreational, and educational groups  Description: Interventions:  - Provide therapeutic and educational activities daily, encourage attendance and participation, and document same in the medical record  - Obtain collateral information, encourage visitation and family involvement in care   Outcome: Progressing  Goal: Recognize maladaptive responses and adopt new coping mechanisms  Outcome: Progressing     Problem: Depression  Goal: Verbalize thoughts and feelings  Description: Interventions:  - Assess and re-assess patient's level of risk   - Engage patient in 1:1 interactions, daily, for a minimum of 15 minutes   - Encourage patient to  express feelings, fears, frustrations, hopes   Outcome: Progressing  Goal: Refrain from harming self  Description: Interventions:  - Monitor patient closely, per order   - Supervise medication ingestion, monitor effects and side effects   Outcome: Progressing     Problem: Risk for Violence/Aggression Toward Others  Goal: Verbalize thoughts and feelings  Description: Interventions:  - Assess and re-assess patient's level of risk, every waking shift  - Engage patient in 1:1 interactions, daily, for a minimum of 15 minutes   - Allow patient to express feelings and frustrations in a safe and non-threatening manner   - Establish rapport/trust with patient   Outcome: Progressing  Goal: Refrain from harming others  Outcome: Progressing  Goal: Refrain from destructive acts on the environment or property  Outcome: Progressing  Goal: Control angry outbursts  Description: Interventions:  - Monitor patient closely, per order  - Ensure early verbal de-escalation  - Monitor prn medication needs  - Set reasonable/therapeutic limits, outline behavioral expectations, and consequences   - Provide a non-threatening milieu, utilizing the least restrictive interventions   Outcome: Progressing

## 2024-01-25 NOTE — NURSING NOTE
"Pt isolative to room this evening. Pt pleasant on approach and reported feeling \"good\" because she is reportedly going home soon. Pt denied all psych symptoms. Pt med/meal compliant. HS Catapres held due to BP not meeting order parameters. Pt is independent for ADLs. Pt is able to and encouraged to inform staff of ant needs.  "

## 2024-01-25 NOTE — PROGRESS NOTES
Progress Note - Behavioral Health   Blanca Mason 53 y.o. female MRN: [id number]  Unit/Bed#: [facility] 651-02 Encounter: [id number]    Patient was seen today for continuation of care, records reviewed and patient was discussed with the morning case review team.    Blanca was seen today for psychiatric follow-up.  On assessment today, Blanca was seen coloring.  No depression or anxiety currently verbalized, patient states that she was able to speak to her cat on the phone yesterday he was with her father.  Excited about being discharged home on February 1, 2024, patient continues to be active on the unit and social with both staff and peers.  No behavioral issues overnight.  No medication changes to be made at this time.  Next dose of Invega Sustenna to be received on 1/31/2024.  We will continue with current plan of care.    Blanca denies acute suicidal/self-harm ideation/intent/plan upon direct inquiry at this time.  Blanca remains behaviorally appropriate, no agitation or aggression noted on exam or in report.  Blanca also denies HI/AH/VH, and does not appear overtly manic.  No overt delusions or paranoia are verbalized. Impulse control is intact.  Blanca remains adherent to her current psychotropic medication regimen and denies any side effects from medications, as well as none noted on exam.    Vitals:  Vitals:    01/25/24 0745   BP: 148/93   Pulse: 69   Resp: 17   Temp: (!) 97.1 °F (36.2 °C)   SpO2: 93%       Laboratory Results:  I have personally reviewed all pertinent laboratory/tests results.  Most Recent Labs:   Lab Results   Component Value Date    WBC 8.96 12/02/2023    RBC 4.34 12/02/2023    HGB 12.9 12/02/2023    HCT 40.5 12/02/2023     12/02/2023    RDW 14.2 12/02/2023    TOTANEUTABS 4.73 11/19/2023    NEUTROABS 5.77 12/02/2023    SODIUM 139 12/02/2023    K 3.8 12/02/2023     12/02/2023    CO2 21 12/02/2023    BUN 13 12/02/2023    CREATININE 0.62 12/02/2023    GLUC 107 12/02/2023    GLUF 107 (H)  12/02/2023    CALCIUM 8.7 12/02/2023    AST 13 12/02/2023    ALT 13 12/02/2023    ALKPHOS 55 12/02/2023    TP 6.0 (L) 12/02/2023    ALB 3.5 12/02/2023    TBILI 0.25 12/02/2023    CHOLESTEROL 234 (H) 03/09/2023    HDL 71 03/09/2023    TRIG 153 (H) 03/09/2023    LDLCALC 132 (H) 03/09/2023    NONHDLC 163 03/09/2023    VALPROICTOT 23 (L) 11/10/2023    AMMONIA 63 11/06/2023    NWV8ZIKVFNAX 4.324 12/02/2023    FREET4 0.81 11/06/2023    T3FREE 2.27 (L) 10/16/2019    PREGUR negative 07/13/2022    PREGSERUM Negative 11/06/2023    RPR Non-Reactive 07/17/2022    HGBA1C 5.0 01/05/2023    EAG 97 01/05/2023       Psychiatric Review of Systems:  Behavior over the last 24 hours:  unchanged.   Sleep: good  Appetite: good  Medication side effects: none  ROS: no complaints, denies shortness of breath or chest pain and all other systems are negative for acute changes    Mental Status Evaluation:  Appearance:  adequate grooming   Behavior:  cooperative, calm   Speech:  normal rate and volume   Mood:  less irritable   Affect:  constricted   Thought Process:  linear   Thought Content:  no overt delusions   Perceptual Disturbances: denies auditory hallucinations when asked, does not appear responding to internal stimuli   Risk Potential: Suicidal ideation - None  Homicidal ideation - None  Potential for aggression - No   Memory:  recent and remote memory grossly intact   Sensorium  person, place, and time/date      Consciousness:  alert and awake   Attention: attention span and concentration are age appropriate   Insight:  improving   Judgment: improving   Gait/Station: normal gait/station   Motor Activity: no abnormal movements   Progress Toward Goals:   Blanca is progressing towards goals of inpatient psychiatric treatment by continued medication compliance and is attending therapeutic modalities on the milieu. However, the patient continues to require inpatient psychiatric hospitalization for continued medication management and titration  to optimize symptom reduction, improve sleep hygiene, and demonstrate adequate self-care.    Assessment/Plan   Principal Problem:    Schizoaffective disorder, bipolar type (HCC)  Active Problems:    Benign essential hypertension    Edema    Hypothyroidism    MAG (obstructive sleep apnea)    Overactive bladder    Sjogren's syndrome (HCC)    Medical clearance for psychiatric admission    Anxiety    Borderline personality disorder (HCC)    History of pulmonary embolism    Ingrown toenail      Recommended Treatment: Treatment plan and medication changes discussed and per the attending physician the plan is:    1.Continue with group therapy, milieu therapy and occupational therapy  2.Behavioral Health checks every 7 minutes  3.Continue frequent safety checks and vitals per unit protocol  4.Continue with SLIM medical management as indicated  5.Continue with current medication regimen  6.Will review labs in the a.m.  7.Disposition Planning: Discharge planning and efforts remain ongoing    Behavioral Health Medications: all current active meds have been reviewed and continue current psychiatric medications.  Current Facility-Administered Medications   Medication Dose Route Frequency Provider Last Rate    aluminum-magnesium hydroxide-simethicone  30 mL Oral Q4H PRN JHONATAN Fields      atoMOXetine  40 mg Oral Daily Anatoly Prajapati, DO      buPROPion  450 mg Oral Daily Anatoly Prajapati, DO      cloNIDine  0.2 mg Oral Q12H [facility] HJONATAN Anne      cyanocobalamin  1,000 mcg Oral Daily Dianna Mcrae, JHONATAN      cyanocobalamin  1,000 mcg Intramuscular Q30 Days JHONATAN Berg      furosemide  20 mg Oral Daily Wendy Anderson, LAURANP      hydrocortisone   Topical 4x Daily PRN JHONATAN Berg      hydrOXYzine HCL  25 mg Oral Q6H PRN Max 4/day Virginia Mccullough, LAURANP      hydrOXYzine HCL  50 mg Oral Q6H PRN Max 4/day Virginia Mccullough, LAURANP      ibuprofen  200 mg Oral Q6H PRN Virginia  Jamel, JHONATAN      ibuprofen  400 mg Oral Q6H PRN Virginia Mccullough, JHONATAN      ibuprofen  600 mg Oral Q8H PRN JHONATAN Anne      levothyroxine  125 mcg Oral Early Morning Wendy Anderson, JHONATAN      loperamide  2 mg Oral Q4H PRN Anatoly Prajapati, DO      LORazepam  1 mg Intramuscular Q6H PRN Max 3/day Virginia Mccullough, JHONATAN      LORazepam  1 mg Oral Q6H PRN Max 3/day JHONATAN Anne      losartan  100 mg Oral Daily Wendy Anderson, LAURANP      melatonin  3 mg Oral HS Frederick Smith MD      naltrexone  50 mg Oral Daily Anatoly Prajapati, DO      OLANZapine  10 mg Oral Q6H PRN Frederick Smith MD      Or    OLANZapine  10 mg Intramuscular Q6H PRN Frederick Smith MD      OLANZapine  5 mg Oral Q6H PRN Frederick Smith MD      Or    OLANZapine  5 mg Intramuscular Q6H PRN Frederick Smith MD      OLANZapine  2.5 mg Oral Q3H PRN Frederick Smith MD      OXcarbazepine  450 mg Oral Q12H [facility] Frederick Smith MD      oxybutynin  5 mg Oral BID JHONATAN Fields      [START ON 1/31/2024] paliperidone  234 mg IM- Deltoid Q4 weeks Frederick Smith MD      pantoprazole  40 mg Oral Early Morning JHONATAN Fields      polyethylene glycol  17 g Oral Daily PRN JHONATAN Anne      saccharomyces boulardii  250 mg Oral BID Anatoly Prajapati, DO      senna-docusate sodium  1 tablet Oral Daily PRN JHONATAN Anne      topiramate  200 mg Oral BID Anatoly Prajapati, DO      traZODone  50 mg Oral HS PRN JHONATAN Anne      warfarin  5 mg Oral Daily (warfarin) JHONATAN Berg         Risks / Benefits of Treatment:  Risks, benefits, and possible side effects of medications explained to patient and patient verbalizes understanding and agreement for treatment.    Counseling / Coordination of Care:  Patient's progress reviewed with nursing staff.  Medications, treatment progress and treatment plan reviewed with patient.  Supportive counseling provided to the patient.    Total  floor/unit time spent today 25 minutes. Greater than 50% of total time was spent with the patient and / or family counseling and / or coordination of care. A description of the counseling / coordination of care: medication education, treatment plan, supportive therapy.    This note was completed in part utilizing Dragon dictation Software. Grammatical, translation, syntax errors, random word insertions, spelling mistakes, and incomplete sentences may be an occasional consequence of this system secondary to software limitations with voice recognition, ambient noise, and hardware issues. If you have any questions or concerns about the content, text, or information contained within the body of this dictation, please contact the provider for clarification

## 2024-01-26 PROCEDURE — 99232 SBSQ HOSP IP/OBS MODERATE 35: CPT

## 2024-01-26 RX ADMIN — BUPROPION HYDROCHLORIDE 450 MG: 300 TABLET, EXTENDED RELEASE ORAL at 08:13

## 2024-01-26 RX ADMIN — LEVOTHYROXINE SODIUM 125 MCG: 125 TABLET ORAL at 05:27

## 2024-01-26 RX ADMIN — TOPIRAMATE 200 MG: 100 TABLET, FILM COATED ORAL at 08:13

## 2024-01-26 RX ADMIN — OXYBUTYNIN CHLORIDE 5 MG: 5 TABLET ORAL at 08:13

## 2024-01-26 RX ADMIN — Medication 250 MG: at 08:13

## 2024-01-26 RX ADMIN — CLONIDINE HYDROCHLORIDE 0.2 MG: 0.1 TABLET ORAL at 08:13

## 2024-01-26 RX ADMIN — CLONIDINE HYDROCHLORIDE 0.2 MG: 0.1 TABLET ORAL at 21:28

## 2024-01-26 RX ADMIN — Medication 250 MG: at 17:05

## 2024-01-26 RX ADMIN — OXCARBAZEPINE 450 MG: 150 TABLET, FILM COATED ORAL at 08:13

## 2024-01-26 RX ADMIN — PANTOPRAZOLE SODIUM 40 MG: 40 TABLET, DELAYED RELEASE ORAL at 05:27

## 2024-01-26 RX ADMIN — WARFARIN SODIUM 5 MG: 5 TABLET ORAL at 17:05

## 2024-01-26 RX ADMIN — FUROSEMIDE 20 MG: 20 TABLET ORAL at 08:13

## 2024-01-26 RX ADMIN — CYANOCOBALAMIN TAB 1000 MCG 1000 MCG: 1000 TAB at 08:13

## 2024-01-26 RX ADMIN — NALTREXONE HYDROCHLORIDE 50 MG: 50 TABLET, FILM COATED ORAL at 08:14

## 2024-01-26 RX ADMIN — OXCARBAZEPINE 450 MG: 150 TABLET, FILM COATED ORAL at 21:28

## 2024-01-26 RX ADMIN — LOSARTAN POTASSIUM 100 MG: 50 TABLET, FILM COATED ORAL at 08:13

## 2024-01-26 RX ADMIN — OXYBUTYNIN CHLORIDE 5 MG: 5 TABLET ORAL at 17:05

## 2024-01-26 RX ADMIN — TOPIRAMATE 200 MG: 100 TABLET, FILM COATED ORAL at 17:05

## 2024-01-26 RX ADMIN — MELATONIN TAB 3 MG 3 MG: 3 TAB at 21:28

## 2024-01-26 RX ADMIN — ATOMOXETINE 40 MG: 40 CAPSULE ORAL at 08:13

## 2024-01-26 NOTE — PROGRESS NOTES
"Progress Note - Behavioral Health   Blanca Mason 53 y.o. female MRN: [id number]  Unit/Bed#: [facility] 651-02 Encounter: [id number]    Patient was seen today for continuation of care, records reviewed and patient was discussed with the morning case review team.    Blanca was seen today for psychiatric follow-up.  On assessment today, Blanca was pleasant and seen sitting with peers in the day room.  Stating that she feels \"okay\", patient reports no changes in sleep or appetite.  Excited about pending discharge on 2/1/24, patient will also receive [place] Invega Sustenna on 1/31/2024 prior to discharge.  No medication change to be made at this time.  Patient continues to tolerate all changes well with no adverse effects.  Tentative discharge 2/1/24 with ACT services and DBT therapy.     Blanca denies acute suicidal/self-harm ideation/intent/plan upon direct inquiry at this time.  Blanca remains behaviorally appropriate, no agitation or aggression noted on exam or in report.  Blanca also denies HI/AH/VH, and does not appear overtly manic.  No overt delusions or paranoia are verbalized. Impulse control is intact.  Blanca remains adherent to her current psychotropic medication regimen and denies any side effects from medications, as well as none noted on exam.    Vitals:  Vitals:    01/26/24 0739   BP: 142/85   Pulse: 77   Resp: 16   Temp: (!) 97.4 °F (36.3 °C)   SpO2: 96%       Laboratory Results:  I have personally reviewed all pertinent laboratory/tests results.  Most Recent Labs:   Lab Results   Component Value Date    WBC 8.96 12/02/2023    RBC 4.34 12/02/2023    HGB 12.9 12/02/2023    HCT 40.5 12/02/2023     12/02/2023    RDW 14.2 12/02/2023    TOTANEUTABS 4.73 11/19/2023    NEUTROABS 5.77 12/02/2023    SODIUM 139 12/02/2023    K 3.8 12/02/2023     12/02/2023    CO2 21 12/02/2023    BUN 13 12/02/2023    CREATININE 0.62 12/02/2023    GLUC 107 12/02/2023    GLUF 107 (H) 12/02/2023    CALCIUM 8.7 12/02/2023    AST 13 " 12/02/2023    ALT 13 12/02/2023    ALKPHOS 55 12/02/2023    TP 6.0 (L) 12/02/2023    ALB 3.5 12/02/2023    TBILI 0.25 12/02/2023    CHOLESTEROL 234 (H) 03/09/2023    HDL 71 03/09/2023    TRIG 153 (H) 03/09/2023    LDLCALC 132 (H) 03/09/2023    NONHDLC 163 03/09/2023    VALPROICTOT 23 (L) 11/10/2023    AMMONIA 63 11/06/2023    [id number] 4.324 12/02/2023    FREET4 0.81 11/06/2023    T3FREE 2.27 (L) 10/16/2019    PREGUR negative 07/13/2022    PREGSERUM Negative 11/06/2023    RPR Non-Reactive 07/17/2022    HGBA1C 5.0 01/05/2023    EAG 97 01/05/2023       Psychiatric Review of Systems:  Behavior over the last 24 hours:  unchanged.   Sleep: good  Appetite: good  Medication side effects: none  ROS: no complaints, denies shortness of breath or chest pain and all other systems are negative for acute changes    Mental Status Evaluation:  Appearance:  adequate grooming   Behavior:  cooperative, calm   Speech:  normal rate and volume   Mood:  less irritable   Affect:  constricted   Thought Process:  linear   Thought Content:  no overt delusions   Perceptual Disturbances: denies auditory hallucinations when asked, does not appear responding to internal stimuli   Risk Potential: Suicidal ideation - None  Homicidal ideation - None  Potential for aggression - No   Memory:  recent and remote memory grossly intact   Sensorium  person, place, and time/date      Consciousness:  alert and awake   Attention: attention span and concentration are age appropriate   Insight:  improving   Judgment: improving   Gait/Station: normal gait/station   Motor Activity: no abnormal movements   Progress Toward Goals:   Blanca is progressing towards goals of inpatient psychiatric treatment by continued medication compliance and is attending therapeutic modalities on the milieu. However, the patient continues to require inpatient psychiatric hospitalization for continued medication management and titration to optimize symptom reduction, improve sleep  hygiene, and demonstrate adequate self-care.    Assessment/Plan   Principal Problem:    Schizoaffective disorder, bipolar type (HCC)  Active Problems:    Benign essential hypertension    Edema    Hypothyroidism    MAG (obstructive sleep apnea)    Overactive bladder    Sjogren's syndrome (HCC)    Medical clearance for psychiatric admission    Anxiety    Borderline personality disorder (HCC)    History of pulmonary embolism    Ingrown toenail      Recommended Treatment: Treatment plan and medication changes discussed and per the attending physician the plan is:    1.Continue with group therapy, milieu therapy and occupational therapy  2.Behavioral Health checks every 7 minutes  3.Continue frequent safety checks and vitals per unit protocol  4.Continue with SLIM medical management as indicated  5.Continue with current medication regimen  6.Will review labs in the a.m.  7.Disposition Planning: Discharge planning and efforts remain ongoing    Behavioral Health Medications: all current active meds have been reviewed and continue current psychiatric medications.  Current Facility-Administered Medications   Medication Dose Route Frequency Provider Last Rate    aluminum-magnesium hydroxide-simethicone  30 mL Oral Q4H PRN JHONATAN Fields      atoMOXetine  40 mg Oral Daily Anatoly Prajapati, DO      buPROPion  450 mg Oral Daily Anatoly Prajapati, DO      cloNIDine  0.2 mg Oral Q12H [facility] Virginia Mccullough, JHONATAN      cyanocobalamin  1,000 mcg Oral Daily Dianna Mcrae, LAURANP      cyanocobalamin  1,000 mcg Intramuscular Q30 Days JHONATAN Berg      furosemide  20 mg Oral Daily Wendy Anderson, LAURANP      hydrocortisone   Topical 4x Daily PRN JHONATAN Berg      hydrOXYzine HCL  25 mg Oral Q6H PRN Max 4/day Virginia Mccullough, LAURANP      hydrOXYzine HCL  50 mg Oral Q6H PRN Max 4/day Virginia Mccullough, LAURANP      ibuprofen  200 mg Oral Q6H PRN Virginia Mccullough, LAURANP      ibuprofen  400 mg Oral Q6H  PRN Virginia Mccullough, JHONATAN      ibuprofen  600 mg Oral Q8H PRN JHONATAN Anne      levothyroxine  125 mcg Oral Early Morning JHONATAN Fields      loperamide  2 mg Oral Q4H PRN Anatoly Prajapati, DO      LORazepam  1 mg Intramuscular Q6H PRN Max 3/day Virginia Mccullough, JHONATAN      LORazepam  1 mg Oral Q6H PRN Max 3/day JHONATAN Anne      losartan  100 mg Oral Daily JHONATAN Fields      melatonin  3 mg Oral HS Frederick Smith MD      naltrexone  50 mg Oral Daily Anatoly Prajapati, DO      OLANZapine  10 mg Oral Q6H PRN Frederick Smith MD      Or    OLANZapine  10 mg Intramuscular Q6H PRN Frederick Smith MD      OLANZapine  5 mg Oral Q6H PRN Frederick Smith MD      Or    OLANZapine  5 mg Intramuscular Q6H PRN Frederick Smith MD      OLANZapine  2.5 mg Oral Q3H PRN Frederick Smith MD      OXcarbazepine  450 mg Oral Q12H [facility] Frederick Smith MD      oxybutynin  5 mg Oral BID JHONATAN Fields      [START ON 1/31/2024] paliperidone  234 mg IM- Deltoid Q4 weeks Frederick Smith MD      pantoprazole  40 mg Oral Early Morning JHONATAN Fields      polyethylene glycol  17 g Oral Daily PRN JHONATAN Anne      saccharomyces boulardii  250 mg Oral BID Anatoly Prajapati, DO      senna-docusate sodium  1 tablet Oral Daily PRN JHONATAN Anne      topiramate  200 mg Oral BID Anatoly Prajapati, DO      traZODone  50 mg Oral HS PRN JHONATAN Anne      warfarin  5 mg Oral Daily (warfarin) JHONATAN Berg         Risks / Benefits of Treatment:  Risks, benefits, and possible side effects of medications explained to patient and patient verbalizes understanding and agreement for treatment.    Counseling / Coordination of Care:  Patient's progress reviewed with nursing staff.  Medications, treatment progress and treatment plan reviewed with patient.  Supportive counseling provided to the patient.    Total floor/unit time spent today 25 minutes. Greater than 50%  of total time was spent with the patient and / or family counseling and / or coordination of care. A description of the counseling / coordination of care: medication education, treatment plan, supportive therapy.    This note was completed in part utilizing Dragon dictation Software. Grammatical, translation, syntax errors, random word insertions, spelling mistakes, and incomplete sentences may be an occasional consequence of this system secondary to software limitations with voice recognition, ambient noise, and hardware issues. If you have any questions or concerns about the content, text, or information contained within the body of this dictation, please contact the provider for clarification

## 2024-01-26 NOTE — PROGRESS NOTES
01/26/24 0751   Team Meeting   Meeting Type Daily Rounds   Initial Conference Date 01/26/24   Next Conference Date 01/29/24   Team Members Present   Team Members Present Physician;Nurse;;;Occupational Therapist   Physician Team Member Dr Smith, Dr Epps, SHEFALI Walsh J. Stives   Nursing Team Member Jennifer   Care Management Team Member Atiya   Social Work Team Member Dalia   OT Team Member Yeison   Patient/Family Present   Patient Present No   Patient's Family Present No     Discharge on 2/1/24, will resume services with [facility] and start their DBT program,

## 2024-01-26 NOTE — NURSING NOTE
Pt withdrawn to room this shift. When this writer made multiple attempts to interview patient, pt refused to answer any questions and was mute. Pt allowed this writer to take vital signs and administer HS medications after heavy encouragement. Pt remained in room and was seen reading and coloring. Pt encouraged to and able to inform staff of any needs.

## 2024-01-26 NOTE — PROGRESS NOTES
01/26/24 1100   Activity/Group Checklist   Group Exercise  (relaxation breathing exercises.)   Attendance Attended   Attendance Duration (min) 31-45   Interactions Interacted appropriately   Affect/Mood Appropriate;Normal range   Goals Achieved Able to engage in interactions;Discussed coping strategies;Able to listen to others

## 2024-01-26 NOTE — PROGRESS NOTES
"Pt attended all groups.  Pt in afternoon group alerted Nursing that she was \"not called on\" during group further attention seeking behaviors.  (Advised Nursing that pt was not assertive or raised hand when asked if her table had any comments/reflection on topic.  Pt not able to make needs known during group or after to therapist.  Person opposite at table did contribute to discussion.  Pt constricted and poor eye contact.)    Reminded to focus on pt goals and d.c planning process.  Will f/u with doctor during rounds Monday about pt negative behavior and attention seeking behaviors.       01/26/24 1330   Activity/Group Checklist   Group Other (Comment)  (positive reflection)   Attendance Attended   Attendance Duration (min) 46-60   Interactions Other (Comment)  (scant)   Affect/Mood Incongruent   Goals Achieved Identified feelings;Discussed self-esteem issues;Discussed coping strategies;Able to listen to others;Able to engage in interactions;Able to self-disclose                  "

## 2024-01-26 NOTE — NURSING NOTE
Nurse observed patient crying in room stated she was not able to verbalize herself in group. Patient is medication and meals compliant. Patient is visible at the moment, calm, social with peers in the dayroom. No further distress noted. Will continue to maintain safety.

## 2024-01-26 NOTE — PLAN OF CARE
Problem: Ineffective Coping  Goal: Participates in unit activities  Description: Interventions:  - Provide therapeutic environment   - Provide required programming   - Redirect inappropriate behaviors   Outcome: Progressing  Goal: Understands least restrictive measures  Description: Interventions:  - Utilize least restrictive behavior  Outcome: Progressing  Goal: Free from restraint events  Description: - Utilize least restrictive measures   - Provide behavioral interventions   - Redirect inappropriate behaviors   Outcome: Progressing     Problem: Risk for Self Injury/Neglect  Goal: Refrain from harming self  Description: Interventions:  - Monitor patient closely, per order  - Develop a trusting relationship  - Supervise medication ingestion, monitor effects and side effects   Outcome: Progressing  Goal: Attend and participate in unit activities, including therapeutic, recreational, and educational groups  Description: Interventions:  - Provide therapeutic and educational activities daily, encourage attendance and participation, and document same in the medical record  - Obtain collateral information, encourage visitation and family involvement in care   Outcome: Progressing  Goal: Complete daily ADLs, including personal hygiene independently, as able  Description: Interventions:  - Observe, teach, and assist patient with ADLS  - Monitor and promote a balance of rest/activity, with adequate nutrition and elimination  Outcome: Progressing     Problem: Risk for Violence/Aggression Toward Others  Goal: Refrain from harming others  Outcome: Progressing

## 2024-01-27 PROCEDURE — 99232 SBSQ HOSP IP/OBS MODERATE 35: CPT

## 2024-01-27 RX ADMIN — Medication 250 MG: at 17:20

## 2024-01-27 RX ADMIN — NALTREXONE HYDROCHLORIDE 50 MG: 50 TABLET, FILM COATED ORAL at 09:21

## 2024-01-27 RX ADMIN — LOSARTAN POTASSIUM 100 MG: 50 TABLET, FILM COATED ORAL at 09:20

## 2024-01-27 RX ADMIN — TOPIRAMATE 200 MG: 100 TABLET, FILM COATED ORAL at 17:21

## 2024-01-27 RX ADMIN — Medication 250 MG: at 09:22

## 2024-01-27 RX ADMIN — ATOMOXETINE 40 MG: 40 CAPSULE ORAL at 09:21

## 2024-01-27 RX ADMIN — OXYBUTYNIN CHLORIDE 5 MG: 5 TABLET ORAL at 17:20

## 2024-01-27 RX ADMIN — OXCARBAZEPINE 450 MG: 150 TABLET, FILM COATED ORAL at 21:34

## 2024-01-27 RX ADMIN — IBUPROFEN 600 MG: 600 TABLET ORAL at 05:51

## 2024-01-27 RX ADMIN — TOPIRAMATE 200 MG: 100 TABLET, FILM COATED ORAL at 09:20

## 2024-01-27 RX ADMIN — LEVOTHYROXINE SODIUM 125 MCG: 125 TABLET ORAL at 05:50

## 2024-01-27 RX ADMIN — OXYBUTYNIN CHLORIDE 5 MG: 5 TABLET ORAL at 09:33

## 2024-01-27 RX ADMIN — CYANOCOBALAMIN TAB 1000 MCG 1000 MCG: 1000 TAB at 09:23

## 2024-01-27 RX ADMIN — BUPROPION HYDROCHLORIDE 450 MG: 300 TABLET, EXTENDED RELEASE ORAL at 09:22

## 2024-01-27 RX ADMIN — CLONIDINE HYDROCHLORIDE 0.2 MG: 0.1 TABLET ORAL at 21:34

## 2024-01-27 RX ADMIN — PANTOPRAZOLE SODIUM 40 MG: 40 TABLET, DELAYED RELEASE ORAL at 05:50

## 2024-01-27 RX ADMIN — CLONIDINE HYDROCHLORIDE 0.2 MG: 0.1 TABLET ORAL at 09:22

## 2024-01-27 RX ADMIN — OXCARBAZEPINE 450 MG: 150 TABLET, FILM COATED ORAL at 09:23

## 2024-01-27 RX ADMIN — FUROSEMIDE 20 MG: 20 TABLET ORAL at 09:23

## 2024-01-27 RX ADMIN — MELATONIN TAB 3 MG 3 MG: 3 TAB at 21:34

## 2024-01-27 RX ADMIN — WARFARIN SODIUM 5 MG: 5 TABLET ORAL at 17:21

## 2024-01-27 NOTE — PROGRESS NOTES
Progress Note - Behavioral Health   Blanca Mason 53 y.o. female MRN: [id number]  Unit/Bed#: [facility] 651-02 Encounter: [id number]    Patient was seen today for continuation of care, records reviewed and patient was discussed with the morning case review team. Per staff, Meche has been behaviorally controlled.  She is meal and medication compliant.      Blanca was seen today for psychiatric follow-up.  On assessment today, Blanca was seen in her room coloring.  Blanca brightened on approach and reports some anxiety relating to discharge.  She is feels her mood has improved since admission.  She reports some trouble falling asleep last night because she was thinking about discharge but has mostly had good sleep recently.      Blanca denies acute suicidal/self-harm ideation/intent/plan upon direct inquiry at this time.  Blanca remains behaviorally controlled, continues with some attention seeking behaviors at times, but no agitation or aggression noted on exam or in report.  Blanca also denies HI/AH/VH, and does not appear overtly manic.  No overt delusions or paranoia are verbalized.  Blanca remains adherent to her current psychotropic medication regimen and denies any side effects from medications, as well as none noted on exam.    Recommended Treatment: Treatment plan and medication changes discussed and per the attending physician the plan is:    1.Continue with group therapy, milieu therapy and occupational therapy  2.Behavioral Health checks every 7 minutes  3.Continue frequent safety checks and vitals per unit protocol  4.Continue with [facility] medical management as indicated  5.Continue with current medication regimen  6.Will review labs in the a.m.  7.Disposition Planning: Discharge planning and efforts remain ongoing    Vitals:  Vitals:    01/27/24 0746   BP: 129/90   Pulse: 73   Resp: 16   Temp: (!) 97 °F (36.1 °C)   SpO2: 93%       Laboratory Results:  I have personally reviewed all pertinent laboratory/tests results.  Most  Recent Labs:   Lab Results   Component Value Date    WBC 8.96 12/02/2023    RBC 4.34 12/02/2023    HGB 12.9 12/02/2023    HCT 40.5 12/02/2023     12/02/2023    RDW 14.2 12/02/2023    TOTANEUTABS 4.73 11/19/2023    NEUTROABS 5.77 12/02/2023    SODIUM 139 12/02/2023    K 3.8 12/02/2023     12/02/2023    CO2 21 12/02/2023    BUN 13 12/02/2023    CREATININE 0.62 12/02/2023    GLUC 107 12/02/2023    GLUF 107 (H) 12/02/2023    CALCIUM 8.7 12/02/2023    AST 13 12/02/2023    ALT 13 12/02/2023    ALKPHOS 55 12/02/2023    TP 6.0 (L) 12/02/2023    ALB 3.5 12/02/2023    TBILI 0.25 12/02/2023    CHOLESTEROL 234 (H) 03/09/2023    HDL 71 03/09/2023    TRIG 153 (H) 03/09/2023    LDLCALC 132 (H) 03/09/2023    NONHDLC 163 03/09/2023    VALPROICTOT 23 (L) 11/10/2023    AMMONIA 63 11/06/2023    NII5JJIXUOWK 4.324 12/02/2023    FREET4 0.81 11/06/2023    T3FREE 2.27 (L) 10/16/2019    PREGUR negative 07/13/2022    PREGSERUM Negative 11/06/2023    RPR Non-Reactive 07/17/2022    HGBA1C 5.0 01/05/2023    EAG 97 01/05/2023       Psychiatric Review of Systems:  Behavior over the last 24 hours:  unchanged.   Sleep: interrupted last night but improved overall  Appetite: adequate  Medication side effects:  denies and none observed  ROS: no complaints, denies shortness of breath or chest pain and all other systems are negative for acute changes    Mental Status Evaluation:    Appearance:  adequate grooming, dressed in hospital attire   Behavior:  pleasant, cooperative, calm   Speech:  normal rate and volume, fluent, clear   Mood:  improved   Affect:  brighter   Thought Process:  linear   Associations: intact associations   Thought Content:  no overt delusions   Perceptual Disturbances: denies auditory or visual hallucinations when asked, does not appear responding to internal stimuli   Risk Potential: Suicidal ideation - None  Homicidal ideation - None  Potential for aggression - No   Sensorium:  oriented to person, place, and  situation   Memory:  recent memory intact   Consciousness:  alert and awake   Attention/Concentration: attention span and concentration appear shorter than expected for age   Insight:  limited   Judgment: limited   Gait/Station: normal gait/station   Motor Activity: no abnormal movements     Progress Toward Goals:   Blanca is progressing towards goals of inpatient psychiatric treatment by continued medication compliance and is attending therapeutic modalities on the milieu. However, the patient continues to require inpatient psychiatric hospitalization for continued medication management and titration to optimize symptom reduction, improve sleep hygiene, and demonstrate adequate self-care.    Risk of Harm to Self:   Nursing Suicide Risk Assessment Last 24 hours: C-SSRS Risk (Since Last Contact)  Calculated C-SSRS Risk Score (Since Last Contact): No Risk Indicated    Risk of Harm to Others:  Nursing Homicide Risk Assessment: Violence Risk to Others: Denies within past 6 months    The following interventions are recommended: behavioral checks every 7 minutes, continued hospitalization on locked unit      Assessment/Plan   Principal Problem:    Schizoaffective disorder, bipolar type (formerly [facility])  Active Problems:    Benign essential hypertension    Edema    Hypothyroidism    MAG (obstructive sleep apnea)    Overactive bladder    Sjogren's syndrome (formerly [facility])    Medical clearance for psychiatric admission    Anxiety    Borderline personality disorder (formerly [facility])    History of pulmonary embolism    Ingrown toenail        Behavioral Health Medications: all current active meds have been reviewed and continue current psychiatric medications.  Current Facility-Administered Medications   Medication Dose Route Frequency Provider Last Rate    aluminum-magnesium hydroxide-simethicone  30 mL Oral Q4H PRN JHONATAN Fields      atoMOXetine  40 mg Oral Daily Anatoly Prajapati DO      buPROPion  450 mg Oral Daily Anatoly Prajapati DO       cloNIDine  0.2 mg Oral Q12H [facility] Virginia Mccullough, CRNP      cyanocobalamin  1,000 mcg Oral Daily Dianna Mcrae, CRNP      cyanocobalamin  1,000 mcg Intramuscular Q30 Days Dianna Mcrae, CRNP      furosemide  20 mg Oral Daily Wendy Anderson, CRNP      hydrocortisone   Topical 4x Daily PRN Dianna Mcrae, CRNP      hydrOXYzine HCL  25 mg Oral Q6H PRN Max 4/day Virginia Mccullough, CRNP      hydrOXYzine HCL  50 mg Oral Q6H PRN Max 4/day Virginia Mccullough, CRNP      ibuprofen  200 mg Oral Q6H PRN Virginia Mccullough, CRNP      ibuprofen  400 mg Oral Q6H PRN Virginia Mccullough, CRNP      ibuprofen  600 mg Oral Q8H PRN Virginia Mccullough, CRNP      levothyroxine  125 mcg Oral Early Morning Wendy Anderson, CRNP      loperamide  2 mg Oral Q4H PRN Anatoly Prajapati, DO      LORazepam  1 mg Intramuscular Q6H PRN Max 3/day Virginia Mccullough, CRNP      LORazepam  1 mg Oral Q6H PRN Max 3/day Virginia Mccullough, CRNP      losartan  100 mg Oral Daily Wendy Anderson, CRNP      melatonin  3 mg Oral HS Frederick Smith MD      naltrexone  50 mg Oral Daily Anatoly Prajapati, DO      OLANZapine  10 mg Oral Q6H PRN Frederick Smith MD      Or    OLANZapine  10 mg Intramuscular Q6H PRN Frederick Smith MD      OLANZapine  5 mg Oral Q6H PRN Frederick Smith MD      Or    OLANZapine  5 mg Intramuscular Q6H PRN Frederick Smith MD      OLANZapine  2.5 mg Oral Q3H PRN Frederick Smith MD      OXcarbazepine  450 mg Oral Q12H [facility] Frederick Smith MD      oxybutynin  5 mg Oral BID Wendy Anderson, JHONATAN      [START ON 1/31/2024] paliperidone  234 mg IM- Deltoid Q4 weeks Frederick Smith MD      pantoprazole  40 mg Oral Early Morning Wendy Anderson, LAURANP      polyethylene glycol  17 g Oral Daily PRN Virginia Mccullough, CRNP      saccharomyces boulardii  250 mg Oral BID Anatoly Prajapati, DO      senna-docusate sodium  1 tablet Oral Daily PRN Virginia Mccullough, JHONATAN      topiramate  200 mg Oral BID Anatoly Prajapati, DO cliffordZOTomase   50 mg Oral HS PRN JHONATAN Anne      warfarin  5 mg Oral Daily (warfarin) JHONATAN Berg         Risks / Benefits of Treatment:  Risks, benefits, and possible side effects of medications explained to patient and patient verbalizes understanding and agreement for treatment.    Counseling / Coordination of Care:  Patient's progress reviewed with nursing staff.  Medications, treatment progress and treatment plan reviewed with patient.  Supportive counseling provided to the patient.    Total floor/unit time spent today 25 minutes. Greater than 50% of total time was spent with the patient and / or family counseling and / or coordination of care. A description of the counseling / coordination of care: medication education, treatment plan, supportive therapy.

## 2024-01-27 NOTE — NURSING NOTE
Patient slept until 0515 saying she is anxious about going home.  Patient also reported a headache; medicated with Motrin 600 mg.

## 2024-01-27 NOTE — NURSING NOTE
Patient is withdrawn to room throughout shift. Denies all psych s/s. Medication compliant. Patient denies any needs at this time. Safety checks ongoing.

## 2024-01-28 PROCEDURE — 99232 SBSQ HOSP IP/OBS MODERATE 35: CPT

## 2024-01-28 RX ADMIN — ATOMOXETINE 40 MG: 40 CAPSULE ORAL at 08:51

## 2024-01-28 RX ADMIN — FUROSEMIDE 20 MG: 20 TABLET ORAL at 08:53

## 2024-01-28 RX ADMIN — BUPROPION HYDROCHLORIDE 450 MG: 300 TABLET, EXTENDED RELEASE ORAL at 08:55

## 2024-01-28 RX ADMIN — OXCARBAZEPINE 450 MG: 150 TABLET, FILM COATED ORAL at 08:51

## 2024-01-28 RX ADMIN — Medication 250 MG: at 17:20

## 2024-01-28 RX ADMIN — Medication 250 MG: at 08:53

## 2024-01-28 RX ADMIN — TOPIRAMATE 200 MG: 100 TABLET, FILM COATED ORAL at 08:52

## 2024-01-28 RX ADMIN — LEVOTHYROXINE SODIUM 125 MCG: 125 TABLET ORAL at 05:42

## 2024-01-28 RX ADMIN — LOSARTAN POTASSIUM 100 MG: 50 TABLET, FILM COATED ORAL at 08:55

## 2024-01-28 RX ADMIN — OXCARBAZEPINE 450 MG: 150 TABLET, FILM COATED ORAL at 20:58

## 2024-01-28 RX ADMIN — TOPIRAMATE 200 MG: 100 TABLET, FILM COATED ORAL at 17:20

## 2024-01-28 RX ADMIN — MELATONIN TAB 3 MG 3 MG: 3 TAB at 20:58

## 2024-01-28 RX ADMIN — WARFARIN SODIUM 5 MG: 5 TABLET ORAL at 17:20

## 2024-01-28 RX ADMIN — CLONIDINE HYDROCHLORIDE 0.2 MG: 0.1 TABLET ORAL at 08:53

## 2024-01-28 RX ADMIN — PANTOPRAZOLE SODIUM 40 MG: 40 TABLET, DELAYED RELEASE ORAL at 05:42

## 2024-01-28 RX ADMIN — CLONIDINE HYDROCHLORIDE 0.2 MG: 0.1 TABLET ORAL at 20:58

## 2024-01-28 RX ADMIN — NALTREXONE HYDROCHLORIDE 50 MG: 50 TABLET, FILM COATED ORAL at 08:55

## 2024-01-28 RX ADMIN — OXYBUTYNIN CHLORIDE 5 MG: 5 TABLET ORAL at 17:41

## 2024-01-28 RX ADMIN — CYANOCOBALAMIN TAB 1000 MCG 1000 MCG: 1000 TAB at 08:52

## 2024-01-28 RX ADMIN — IBUPROFEN 400 MG: 400 TABLET ORAL at 05:45

## 2024-01-28 RX ADMIN — OXYBUTYNIN CHLORIDE 5 MG: 5 TABLET ORAL at 09:49

## 2024-01-28 NOTE — NURSING NOTE
Patient is awake and alert, visible in the milieu. Social with peers and conversational. Reports some anxiety, denies all other psych symptoms. Medication compliant and pleasant on approach. Safety precautions maintained.

## 2024-01-28 NOTE — PLAN OF CARE
Problem: Risk for Self Injury/Neglect  Goal: Treatment Goal: Remain safe during length of stay, learn and adopt new coping skills, and be free of self-injurious ideation, impulses and acts at the time of discharge  Outcome: Progressing  Goal: Verbalize thoughts and feelings  Description: Interventions:  - Assess and re-assess patient's lethality and potential for self-injury  - Engage patient in 1:1 interactions, daily, for a minimum of 15 minutes  - Encourage patient to express feelings, fears, frustrations, hopes  - Establish rapport/trust with patient   Outcome: Progressing  Goal: Refrain from harming self  Description: Interventions:  - Monitor patient closely, per order  - Develop a trusting relationship  - Supervise medication ingestion, monitor effects and side effects   Outcome: Progressing  Goal: Attend and participate in unit activities, including therapeutic, recreational, and educational groups  Description: Interventions:  - Provide therapeutic and educational activities daily, encourage attendance and participation, and document same in the medical record  - Obtain collateral information, encourage visitation and family involvement in care   Outcome: Progressing  Goal: Recognize maladaptive responses and adopt new coping mechanisms  Outcome: Progressing  Goal: Complete daily ADLs, including personal hygiene independently, as able  Description: Interventions:  - Observe, teach, and assist patient with ADLS  - Monitor and promote a balance of rest/activity, with adequate nutrition and elimination  Outcome: Progressing     Problem: Depression  Goal: Treatment Goal: Demonstrate behavioral control of depressive symptoms, verbalize feelings of improved mood/affect, and adopt new coping skills prior to discharge  Outcome: Progressing  Goal: Verbalize thoughts and feelings  Description: Interventions:  - Assess and re-assess patient's level of risk   - Engage patient in 1:1 interactions, daily, for a minimum  of 15 minutes   - Encourage patient to express feelings, fears, frustrations, hopes   Outcome: Progressing  Goal: Refrain from harming self  Description: Interventions:  - Monitor patient closely, per order   - Supervise medication ingestion, monitor effects and side effects   Outcome: Progressing  Goal: Refrain from isolation  Description: Interventions:  - Develop a trusting relationship   - Encourage socialization   Outcome: Progressing  Goal: Refrain from self-neglect  Outcome: Progressing  Goal: Attend and participate in unit activities, including therapeutic, recreational, and educational groups  Description: Interventions:  - Provide therapeutic and educational activities daily, encourage attendance and participation, and document same in the medical record   Outcome: Progressing  Goal: Complete daily ADLs, including personal hygiene independently, as able  Description: Interventions:  - Observe, teach, and assist patient with ADLS  -  Monitor and promote a balance of rest/activity, with adequate nutrition and elimination   Outcome: Progressing     Problem: Anxiety  Goal: Anxiety is at manageable level  Description: Interventions:  - Assess and monitor patient's anxiety level.   - Monitor for signs and symptoms (heart palpitations, chest pain, shortness of breath, headaches, nausea, feeling jumpy, restlessness, irritable, apprehensive).   - Collaborate with interdisciplinary team and initiate plan and interventions as ordered.  - [place] patient to unit/surroundings  - Explain treatment plan  - Encourage participation in care  - Encourage verbalization of concerns/fears  - Identify coping mechanisms  - Assist in developing anxiety-reducing skills  - Administer/offer alternative therapies  - Limit or eliminate stimulants  Outcome: Progressing     Problem: Risk for Violence/Aggression Toward Others  Goal: Treatment Goal: Refrain from acts of violence/aggression during length of stay, and demonstrate improved  impulse control at the time of discharge  Outcome: Progressing  Goal: Verbalize thoughts and feelings  Description: Interventions:  - Assess and re-assess patient's level of risk, every waking shift  - Engage patient in 1:1 interactions, daily, for a minimum of 15 minutes   - Allow patient to express feelings and frustrations in a safe and non-threatening manner   - Establish rapport/trust with patient   Outcome: Progressing  Goal: Refrain from harming others  Outcome: Progressing  Goal: Refrain from destructive acts on the environment or property  Outcome: Progressing  Goal: Control angry outbursts  Description: Interventions:  - Monitor patient closely, per order  - Ensure early verbal de-escalation  - Monitor prn medication needs  - Set reasonable/therapeutic limits, outline behavioral expectations, and consequences   - Provide a non-threatening milieu, utilizing the least restrictive interventions   Outcome: Progressing  Goal: Attend and participate in unit activities, including therapeutic, recreational, and educational groups  Description: Interventions:  - Provide therapeutic and educational activities daily, encourage attendance and participation, and document same in the medical record   Outcome: Progressing  Goal: Identify appropriate positive anger management techniques  Description: Interventions:  - Offer anger management and coping skills groups   - Staff will provide positive feedback for appropriate anger control  Outcome: Progressing

## 2024-01-28 NOTE — PROGRESS NOTES
"Progress Note - Behavioral Health   Blanca Mason 53 y.o. female MRN: [id number]  Unit/Bed#: [facility] 651-02 Encounter: [id number]    Patient was seen today for continuation of care, records reviewed and patient was discussed with the morning case review team. Per staff, Blanca continues to be behaviorally controlled.  She is visible and social, endorsing some anxiety.      Blanca was seen today for psychiatric follow-up.  On assessment today, Blanca was seen in the group room away from peers.  She reports that' she continues to feel \"pretty good\".  She again reports some mild anxiety because she is \"anxious to go home\".  She notes improved sleep last night and adequate energy during the daytime.  Appetite remains good as well.     Blanca denies acute suicidal/self-harm ideation/intent/plan upon direct inquiry at this time.  Blanca remains behaviorally controlled with no agitation or aggression noted on exam or in report.  Blanca also denies HI/AH/VH, and does not appear overtly manic.  No overt delusions or paranoia are verbalized.  Blanca remains adherent to her current psychotropic medication regimen and denies any side effects from medications, as well as none noted on exam.    Recommended Treatment: Treatment plan and medication changes discussed and per the attending physician the plan is:    1.Continue with group therapy, milieu therapy and occupational therapy  2.Behavioral Health checks every 7 minutes  3.Continue frequent safety checks and vitals per unit protocol  4.Continue with SLIM medical management as indicated  5.Continue with current medication regimen  6.Will review labs in the a.m.  7.Disposition Planning: Discharge planning and efforts remain ongoing    Vitals:  Vitals:    01/28/24 0853   BP: 125/73   Pulse:    Resp:    Temp:    SpO2:        Laboratory Results:  I have personally reviewed all pertinent laboratory/tests results.  Most Recent Labs:   Lab Results   Component Value Date    WBC 8.96 12/02/2023    " "RBC 4.34 12/02/2023    HGB 12.9 12/02/2023    HCT 40.5 12/02/2023     12/02/2023    RDW 14.2 12/02/2023    TOTANEUTABS 4.73 11/19/2023    NEUTROABS 5.77 12/02/2023    SODIUM 139 12/02/2023    K 3.8 12/02/2023     12/02/2023    CO2 21 12/02/2023    BUN 13 12/02/2023    CREATININE 0.62 12/02/2023    GLUC 107 12/02/2023    GLUF 107 (H) 12/02/2023    CALCIUM 8.7 12/02/2023    AST 13 12/02/2023    ALT 13 12/02/2023    ALKPHOS 55 12/02/2023    TP 6.0 (L) 12/02/2023    ALB 3.5 12/02/2023    TBILI 0.25 12/02/2023    CHOLESTEROL 234 (H) 03/09/2023    HDL 71 03/09/2023    TRIG 153 (H) 03/09/2023    LDLCALC 132 (H) 03/09/2023    NONHDLC 163 03/09/2023    VALPROICTOT 23 (L) 11/10/2023    AMMONIA 63 11/06/2023    [id number] 4.324 12/02/2023    FREET4 0.81 11/06/2023    T3FREE 2.27 (L) 10/16/2019    PREGUR negative 07/13/2022    PREGSERUM Negative 11/06/2023    RPR Non-Reactive 07/17/2022    HGBA1C 5.0 01/05/2023    EAG 97 01/05/2023       Psychiatric Review of Systems:  Behavior over the last 24 hours:  slowly improving  Sleep: improving  Appetite: adequate  Medication side effects:  denies and none observed  ROS: no complaints, denies shortness of breath or chest pain and all other systems are negative for acute changes    Mental Status Evaluation:    Appearance:  adequate grooming, dressed in hospital attire   Behavior:  pleasant, cooperative, calm   Speech:  normal rate and volume, fluent, clear   Mood:  improved euthymic, \"pretty good\"   Affect:  brighter   Thought Process:  organized, goal directed, linear   Associations: intact associations   Thought Content:  no overt delusions   Perceptual Disturbances: denies auditory or visual hallucinations when asked, does not appear responding to internal stimuli   Risk Potential: Suicidal ideation - None  Homicidal ideation - None  Potential for aggression - No   Sensorium:  oriented to person, place, and situation   Memory:  recent memory intact   Consciousness:  " alert and awake   Attention/Concentration: attention span and concentration appear shorter than expected for age   Insight:  limited   Judgment: limited   Gait/Station: normal gait/station   Motor Activity: no abnormal movements     Progress Toward Goals:   Blanca is progressing towards goals of inpatient psychiatric treatment by continued medication compliance and is attending therapeutic modalities on the milieu. However, the patient continues to require inpatient psychiatric hospitalization for continued medication management and titration to optimize symptom reduction, improve sleep hygiene, and demonstrate adequate self-care.    Risk of Harm to Self:   Nursing Suicide Risk Assessment Last 24 hours: C-SSRS Risk (Since Last Contact)  Calculated C-SSRS Risk Score (Since Last Contact): No Risk Indicated    Risk of Harm to Others:  Nursing Homicide Risk Assessment: Violence Risk to Others: Denies within past 6 months    The following interventions are recommended: behavioral checks every 7 minutes, continued hospitalization on locked unit      Assessment/Plan   Principal Problem:    Schizoaffective disorder, bipolar type (HCC)  Active Problems:    Benign essential hypertension    Edema    Hypothyroidism    MAG (obstructive sleep apnea)    Overactive bladder    Sjogren's syndrome (HCC)    Medical clearance for psychiatric admission    Anxiety    Borderline personality disorder (HCC)    History of pulmonary embolism    Ingrown toenail        Behavioral Health Medications: all current active meds have been reviewed and continue current psychiatric medications.  Current Facility-Administered Medications   Medication Dose Route Frequency Provider Last Rate    aluminum-magnesium hydroxide-simethicone  30 mL Oral Q4H PRN JHONATAN Fields      atoMOXetine  40 mg Oral Daily Anatoly Prajapati, DO      buPROPion  450 mg Oral Daily Anatoly Prajapati, DO      cloNIDine  0.2 mg Oral Q12H JHONATAN Martinez       cyanocobalamin  1,000 mcg Oral Daily Dianna Mcrae, CRNP      cyanocobalamin  1,000 mcg Intramuscular Q30 Days Dianna Mcrae, CRNP      furosemide  20 mg Oral Daily Wendy Anderson, CRNP      hydrocortisone   Topical 4x Daily PRN Dianna Mcrae, CRNP      hydrOXYzine HCL  25 mg Oral Q6H PRN Max 4/day Virginia Mccullough, CRNP      hydrOXYzine HCL  50 mg Oral Q6H PRN Max 4/day Virginia Mccullough, CRNP      ibuprofen  200 mg Oral Q6H PRN Virginia Mccullough, CRNP      ibuprofen  400 mg Oral Q6H PRN Virginia Mccullough, CRNP      ibuprofen  600 mg Oral Q8H PRN Virginia Mccullough, CRNP      levothyroxine  125 mcg Oral Early Morning Wendy Anderson, CRNP      loperamide  2 mg Oral Q4H PRN Anatoly Prajapati, DO      LORazepam  1 mg Intramuscular Q6H PRN Max 3/day Virginia Mccullough, CRNP      LORazepam  1 mg Oral Q6H PRN Max 3/day Virginia Mccullough, CRNP      losartan  100 mg Oral Daily Wendy Anderson, CRNP      melatonin  3 mg Oral HS Frederick Smith MD      naltrexone  50 mg Oral Daily Anatoly Prajapati, DO      OLANZapine  10 mg Oral Q6H PRN Frederick Smith MD      Or    OLANZapine  10 mg Intramuscular Q6H PRN Frederick Smith MD      OLANZapine  5 mg Oral Q6H PRN Frederick Smith MD      Or    OLANZapine  5 mg Intramuscular Q6H PRN Frederick Smith MD      OLANZapine  2.5 mg Oral Q3H PRN Frdeerick Smith MD      OXcarbazepine  450 mg Oral Q12H [facility] Frederick Smith MD      oxybutynin  5 mg Oral BID Wendy Anderson, JHONATAN      [START ON 1/31/2024] paliperidone  234 mg IM- Deltoid Q4 weeks Frederick Smith MD      pantoprazole  40 mg Oral Early Morning Wendy Anderson, LAURANP      polyethylene glycol  17 g Oral Daily PRN Virginia Mccullough, JHONATAN      saccharomyces boulardii  250 mg Oral BID Anatoly Prajapati, DO      senna-docusate sodium  1 tablet Oral Daily PRN Virginia Mccullough, CRNP      topiramate  200 mg Oral BID Anatoly Prajapati, DO      traZODone  50 mg Oral HS PRN JHONATAN Anne      warfarin  5  mg Oral Daily (warfarin) JHONATAN Berg         Risks / Benefits of Treatment:  Risks, benefits, and possible side effects of medications explained to patient and patient verbalizes understanding and agreement for treatment.    Counseling / Coordination of Care:  Patient's progress reviewed with nursing staff.  Medications, treatment progress and treatment plan reviewed with patient.  Supportive counseling provided to the patient.    Total floor/unit time spent today 25 minutes. Greater than 50% of total time was spent with the patient and / or family counseling and / or coordination of care. A description of the counseling / coordination of care: medication education, treatment plan, supportive therapy.

## 2024-01-28 NOTE — NURSING NOTE
"Patient is visible on the unit and social with select peers. She endorses anxiety r/t discharge but jokingly tells this writer \"I don't ever want to see you again.\" She is amendable to taking medications and denied any unmet needs. Encouraged to inform staff of any needs or concerns.   "

## 2024-01-29 ENCOUNTER — PATIENT OUTREACH (OUTPATIENT)
Dept: CASE MANAGEMENT | Facility: HOSPITAL | Age: 54
End: 2024-01-29

## 2024-01-29 LAB
ANION GAP SERPL CALCULATED.3IONS-SCNC: 8 MMOL/L
BASOPHILS # BLD AUTO: 0.04 THOUSANDS/ÂΜL (ref 0–0.1)
BASOPHILS NFR BLD AUTO: 1 % (ref 0–1)
BUN SERPL-MCNC: 13 MG/DL (ref 5–25)
CALCIUM SERPL-MCNC: 8.6 MG/DL (ref 8.4–10.2)
CHLORIDE SERPL-SCNC: 104 MMOL/L (ref 96–108)
CHOLEST SERPL-MCNC: 207 MG/DL
CO2 SERPL-SCNC: 26 MMOL/L (ref 21–32)
CREAT SERPL-MCNC: 0.62 MG/DL (ref 0.6–1.3)
EOSINOPHIL # BLD AUTO: 0.11 THOUSAND/ÂΜL (ref 0–0.61)
EOSINOPHIL NFR BLD AUTO: 2 % (ref 0–6)
ERYTHROCYTE [DISTWIDTH] IN BLOOD BY AUTOMATED COUNT: 14.1 % (ref 11.6–15.1)
EST. AVERAGE GLUCOSE BLD GHB EST-MCNC: 117 MG/DL
GFR SERPL CREATININE-BSD FRML MDRD: 104 ML/MIN/1.73SQ M
GLUCOSE P FAST SERPL-MCNC: 88 MG/DL (ref 65–99)
GLUCOSE SERPL-MCNC: 88 MG/DL (ref 65–140)
HBA1C MFR BLD: 5.7 %
HCT VFR BLD AUTO: 43.4 % (ref 34.8–46.1)
HDLC SERPL-MCNC: 48 MG/DL
HGB BLD-MCNC: 13.8 G/DL (ref 11.5–15.4)
IMM GRANULOCYTES # BLD AUTO: 0.05 THOUSAND/UL (ref 0–0.2)
IMM GRANULOCYTES NFR BLD AUTO: 1 % (ref 0–2)
INR PPP: 1.98 (ref 0.84–1.19)
LDLC SERPL CALC-MCNC: 127 MG/DL (ref 0–100)
LYMPHOCYTES # BLD AUTO: 1.66 THOUSANDS/ÂΜL (ref 0.6–4.47)
LYMPHOCYTES NFR BLD AUTO: 22 % (ref 14–44)
MCH RBC QN AUTO: 29.4 PG (ref 26.8–34.3)
MCHC RBC AUTO-ENTMCNC: 31.8 G/DL (ref 31.4–37.4)
MCV RBC AUTO: 93 FL (ref 82–98)
MONOCYTES # BLD AUTO: 0.76 THOUSAND/ÂΜL (ref 0.17–1.22)
MONOCYTES NFR BLD AUTO: 10 % (ref 4–12)
NEUTROPHILS # BLD AUTO: 4.9 THOUSANDS/ÂΜL (ref 1.85–7.62)
NEUTS SEG NFR BLD AUTO: 64 % (ref 43–75)
NONHDLC SERPL-MCNC: 159 MG/DL
NRBC BLD AUTO-RTO: 0 /100 WBCS
PLATELET # BLD AUTO: 154 THOUSANDS/UL (ref 149–390)
PMV BLD AUTO: 11.3 FL (ref 8.9–12.7)
POTASSIUM SERPL-SCNC: 4 MMOL/L (ref 3.5–5.3)
PROTHROMBIN TIME: 22.8 SECONDS (ref 11.6–14.5)
RBC # BLD AUTO: 4.69 MILLION/UL (ref 3.81–5.12)
SODIUM SERPL-SCNC: 138 MMOL/L (ref 135–147)
TRIGL SERPL-MCNC: 162 MG/DL
WBC # BLD AUTO: 7.52 THOUSAND/UL (ref 4.31–10.16)

## 2024-01-29 PROCEDURE — 80061 LIPID PANEL: CPT | Performed by: STUDENT IN AN ORGANIZED HEALTH CARE EDUCATION/TRAINING PROGRAM

## 2024-01-29 PROCEDURE — 80048 BASIC METABOLIC PNL TOTAL CA: CPT | Performed by: NURSE PRACTITIONER

## 2024-01-29 PROCEDURE — 83036 HEMOGLOBIN GLYCOSYLATED A1C: CPT | Performed by: STUDENT IN AN ORGANIZED HEALTH CARE EDUCATION/TRAINING PROGRAM

## 2024-01-29 PROCEDURE — 85025 COMPLETE CBC W/AUTO DIFF WBC: CPT | Performed by: NURSE PRACTITIONER

## 2024-01-29 PROCEDURE — 85610 PROTHROMBIN TIME: CPT | Performed by: NURSE PRACTITIONER

## 2024-01-29 PROCEDURE — 99232 SBSQ HOSP IP/OBS MODERATE 35: CPT

## 2024-01-29 RX ORDER — WARFARIN SODIUM 5 MG/1
5 TABLET ORAL
Status: DISCONTINUED | OUTPATIENT
Start: 2024-01-29 | End: 2024-02-01 | Stop reason: HOSPADM

## 2024-01-29 RX ADMIN — LOSARTAN POTASSIUM 100 MG: 50 TABLET, FILM COATED ORAL at 08:15

## 2024-01-29 RX ADMIN — OXCARBAZEPINE 450 MG: 150 TABLET, FILM COATED ORAL at 21:19

## 2024-01-29 RX ADMIN — BUPROPION HYDROCHLORIDE 450 MG: 300 TABLET, EXTENDED RELEASE ORAL at 08:15

## 2024-01-29 RX ADMIN — OXYBUTYNIN CHLORIDE 5 MG: 5 TABLET ORAL at 08:19

## 2024-01-29 RX ADMIN — CLONIDINE HYDROCHLORIDE 0.2 MG: 0.1 TABLET ORAL at 08:15

## 2024-01-29 RX ADMIN — NALTREXONE HYDROCHLORIDE 50 MG: 50 TABLET, FILM COATED ORAL at 08:15

## 2024-01-29 RX ADMIN — OXCARBAZEPINE 450 MG: 150 TABLET, FILM COATED ORAL at 08:15

## 2024-01-29 RX ADMIN — CYANOCOBALAMIN TAB 1000 MCG 1000 MCG: 1000 TAB at 08:15

## 2024-01-29 RX ADMIN — OXYBUTYNIN CHLORIDE 5 MG: 5 TABLET ORAL at 17:15

## 2024-01-29 RX ADMIN — Medication 250 MG: at 08:15

## 2024-01-29 RX ADMIN — WARFARIN SODIUM 5 MG: 5 TABLET ORAL at 17:15

## 2024-01-29 RX ADMIN — TOPIRAMATE 200 MG: 100 TABLET, FILM COATED ORAL at 17:15

## 2024-01-29 RX ADMIN — FUROSEMIDE 20 MG: 20 TABLET ORAL at 08:15

## 2024-01-29 RX ADMIN — CLONIDINE HYDROCHLORIDE 0.2 MG: 0.1 TABLET ORAL at 21:19

## 2024-01-29 RX ADMIN — IBUPROFEN 600 MG: 600 TABLET ORAL at 05:43

## 2024-01-29 RX ADMIN — PANTOPRAZOLE SODIUM 40 MG: 40 TABLET, DELAYED RELEASE ORAL at 05:43

## 2024-01-29 RX ADMIN — TOPIRAMATE 200 MG: 100 TABLET, FILM COATED ORAL at 08:15

## 2024-01-29 RX ADMIN — ATOMOXETINE 40 MG: 40 CAPSULE ORAL at 08:15

## 2024-01-29 RX ADMIN — LEVOTHYROXINE SODIUM 125 MCG: 125 TABLET ORAL at 05:42

## 2024-01-29 RX ADMIN — Medication 250 MG: at 17:15

## 2024-01-29 RX ADMIN — MELATONIN TAB 3 MG 3 MG: 3 TAB at 21:19

## 2024-01-29 NOTE — PROGRESS NOTES
Pt attended afternoon group.  Pt bright and pleasant.  Pt indicated she misses her Dad and he needs surgery (hand).  Pt did note she needs to f/u with her DBT program and journaling after d.c.      01/29/24 1330   Activity/Group Checklist   Group Other (Comment)  (Mindfulness and journaling)   Attendance Attended   Attendance Duration (min) 46-60   Interactions Interacted appropriately   Affect/Mood Bright   Goals Achieved Identified feelings;Discussed coping strategies;Able to listen to others;Able to engage in interactions;Able to reflect/comment on own behavior;Able to manage/cope with feelings;Verbalized increased hopefulness;Able to self-disclose;Able to recieve feedback;Discussed self-esteem issues;Identified relapse prevention strategies

## 2024-01-29 NOTE — PLAN OF CARE
Problem: Ineffective Coping  Goal: Identifies ineffective coping skills  Outcome: Progressing  Goal: Demonstrates healthy coping skills  Outcome: Progressing     Problem: Risk for Self Injury/Neglect  Goal: Treatment Goal: Remain safe during length of stay, learn and adopt new coping skills, and be free of self-injurious ideation, impulses and acts at the time of discharge  Outcome: Progressing  Goal: Verbalize thoughts and feelings  Description: Interventions:  - Assess and re-assess patient's lethality and potential for self-injury  - Engage patient in 1:1 interactions, daily, for a minimum of 15 minutes  - Encourage patient to express feelings, fears, frustrations, hopes  - Establish rapport/trust with patient   Outcome: Progressing  Goal: Refrain from harming self  Description: Interventions:  - Monitor patient closely, per order  - Develop a trusting relationship  - Supervise medication ingestion, monitor effects and side effects   Outcome: Progressing     Problem: Depression  Goal: Treatment Goal: Demonstrate behavioral control of depressive symptoms, verbalize feelings of improved mood/affect, and adopt new coping skills prior to discharge  Outcome: Progressing  Goal: Verbalize thoughts and feelings  Description: Interventions:  - Assess and re-assess patient's level of risk   - Engage patient in 1:1 interactions, daily, for a minimum of 15 minutes   - Encourage patient to express feelings, fears, frustrations, hopes   Outcome: Progressing  Goal: Refrain from harming self  Description: Interventions:  - Monitor patient closely, per order   - Supervise medication ingestion, monitor effects and side effects   Outcome: Progressing  Goal: Refrain from isolation  Description: Interventions:  - Develop a trusting relationship   - Encourage socialization   Outcome: Progressing

## 2024-01-29 NOTE — PROGRESS NOTES
RAIN HERNANDEZ completed chart review at this time. Patient remains inpatient. Will continue to monitor and will plan to outreach upon discharge.

## 2024-01-29 NOTE — PROGRESS NOTES
01/29/24 0814   Team Meeting   Meeting Type Daily Rounds   Initial Conference Date 01/29/24   Next Conference Date 01/30/24   Team Members Present   Team Members Present Physician;Nurse;;;Occupational Therapist   Physician Team Member Dr Smith, SHEFALI Lovell   Nursing Team Member Aram   Care Management Team Member Atiya   Social Work Team Member Dalia TALBOT Team Member Yeison   Patient/Family Present   Patient Present No   Patient's Family Present No     [place] on 31st, will continue with ACT and DBT. Discharge on 2/1/24.

## 2024-01-29 NOTE — CASE MANAGEMENT
Called [facility] ([phone]) and spoke to Juani to see when they will be picking up the patient on Thursday. She advised that she will have someone call me tomorrow to advise of the details. She also asked that we send her discharge paperwork to her at [email or web address]

## 2024-01-29 NOTE — NURSING NOTE
Pt pleasant and med-compliant with even affect.  Pt social with peers.  VSS.  Good appetite and steady gait.  Denied SI.  Monitored for safety and support.

## 2024-01-29 NOTE — PROGRESS NOTES
Progress Note - Behavioral Health   Blanca Mason 53 y.o. female MRN: [id number]  Unit/Bed#: [facility] 651-02 Encounter: [id number]    Patient was seen today for continuation of care, records reviewed and patient was discussed with the morning case review team.    Blanca was seen today for psychiatric follow-up.  On assessment today, Blanca was pleasant and cooperative.  Seen for a day room, patient is noted to be currently coloring.  No depression or anxiety currently verbalized.  She continues to be future oriented, and is optimistic about discharge later this week.  No behavioral agitation or aggression verbalized by staff.  Patient continues to be in good behavioral control with no as needed medications given.  Invega Sustenna to be given on 1/31/2024.  Tentative discharge 2/1/2024.    Blanca denies acute suicidal/self-harm ideation/intent/plan upon direct inquiry at this time.  Blanca remains behaviorally appropriate, no agitation or aggression noted on exam or in report.  Blanca also denies HI/AH/VH, and does not appear overtly manic.  No overt delusions or paranoia are verbalized. Impulse control is intact.  Blanca remains adherent to her current psychotropic medication regimen and denies any side effects from medications, as well as none noted on exam.    Vitals:  Vitals:    01/29/24 0745   BP: 143/79   Pulse: 73   Resp: 18   Temp: 97.5 °F (36.4 °C)   SpO2: 94%       Laboratory Results:  I have personally reviewed all pertinent laboratory/tests results.  Most Recent Labs:   Lab Results   Component Value Date    WBC 7.52 01/29/2024    RBC 4.69 01/29/2024    HGB 13.8 01/29/2024    HCT 43.4 01/29/2024     01/29/2024    RDW 14.1 01/29/2024    TOTANEUTABS 4.73 11/19/2023    NEUTROABS 4.90 01/29/2024    SODIUM 138 01/29/2024    K 4.0 01/29/2024     01/29/2024    CO2 26 01/29/2024    BUN 13 01/29/2024    CREATININE 0.62 01/29/2024    GLUC 88 01/29/2024    GLUF 88 01/29/2024    CALCIUM 8.6 01/29/2024    AST 13  12/02/2023    ALT 13 12/02/2023    ALKPHOS 55 12/02/2023    TP 6.0 (L) 12/02/2023    ALB 3.5 12/02/2023    TBILI 0.25 12/02/2023    CHOLESTEROL 207 (H) 01/29/2024    HDL 48 (L) 01/29/2024    TRIG 162 (H) 01/29/2024    LDLCALC 127 (H) 01/29/2024    NONHDLC 159 01/29/2024    VALPROICTOT 23 (L) 11/10/2023    AMMONIA 63 11/06/2023    [id number] 4.324 12/02/2023    FREET4 0.81 11/06/2023    T3FREE 2.27 (L) 10/16/2019    PREGUR negative 07/13/2022    PREGSERUM Negative 11/06/2023    RPR Non-Reactive 07/17/2022    HGBA1C 5.7 (H) 01/29/2024     01/29/2024       Psychiatric Review of Systems:  Behavior over the last 24 hours:  unchanged.   Sleep: good  Appetite: good  Medication side effects: none  ROS: no complaints, denies shortness of breath or chest pain and all other systems are negative for acute changes    Mental Status Evaluation:  Appearance:  adequate grooming   Behavior:  cooperative, calm   Speech:  normal rate and volume   Mood:  less irritable   Affect:  constricted   Thought Process:  linear   Thought Content:  no overt delusions   Perceptual Disturbances: denies auditory hallucinations when asked, does not appear responding to internal stimuli   Risk Potential: Suicidal ideation - None  Homicidal ideation - None  Potential for aggression - No   Memory:  recent and remote memory grossly intact   Sensorium  person, place, and time/date      Consciousness:  alert and awake   Attention: attention span and concentration are age appropriate   Insight:  improving   Judgment: improving   Gait/Station: normal gait/station   Motor Activity: no abnormal movements   Progress Toward Goals:   Blanca is progressing towards goals of inpatient psychiatric treatment by continued medication compliance and is attending therapeutic modalities on the milieu. However, the patient continues to require inpatient psychiatric hospitalization for continued medication management and titration to optimize symptom reduction, improve  sleep hygiene, and demonstrate adequate self-care.    Assessment/Plan   Principal Problem:    Schizoaffective disorder, bipolar type (HCC)  Active Problems:    Benign essential hypertension    Edema    Hypothyroidism    MAG (obstructive sleep apnea)    Overactive bladder    Sjogren's syndrome (HCC)    Medical clearance for psychiatric admission    Anxiety    Borderline personality disorder (HCC)    History of pulmonary embolism    Ingrown toenail      Recommended Treatment: Treatment plan and medication changes discussed and per the attending physician the plan is:    1.Continue with group therapy, milieu therapy and occupational therapy  2.Behavioral Health checks every 7 minutes  3.Continue frequent safety checks and vitals per unit protocol  4.Continue with SLIM medical management as indicated  5.Continue with current medication regimen  6.Will review labs in the a.m.  7.Disposition Planning: Discharge planning and efforts remain ongoing    Behavioral Health Medications: all current active meds have been reviewed and continue current psychiatric medications.  Current Facility-Administered Medications   Medication Dose Route Frequency Provider Last Rate    aluminum-magnesium hydroxide-simethicone  30 mL Oral Q4H PRN JHONATAN Fields      atoMOXetine  40 mg Oral Daily Anatoly Prajapati, DO      buPROPion  450 mg Oral Daily Anatoly Prajapati, DO      cloNIDine  0.2 mg Oral Q12H [facility] JHONATAN Anne      cyanocobalamin  1,000 mcg Oral Daily LAURA BergNP      cyanocobalamin  1,000 mcg Intramuscular Q30 Days JHONATAN Berg      furosemide  20 mg Oral Daily Wendy Anderson, JHONATAN      hydrocortisone   Topical 4x Daily PRN JHONATAN Berg      hydrOXYzine HCL  25 mg Oral Q6H PRN Max 4/day Virginia Mccullough, LAURANP      hydrOXYzine HCL  50 mg Oral Q6H PRN Max 4/day Virginia Mccullough, LAURANP      ibuprofen  200 mg Oral Q6H PRN Virginia Mccullough, LAURANP      ibuprofen  400 mg  Oral Q6H PRN JHONATAN Anne      ibuprofen  600 mg Oral Q8H PRN JHONATAN Anne      levothyroxine  125 mcg Oral Early Morning JHONATAN Fields      loperamide  2 mg Oral Q4H PRN Anatoly Prajapati, DO      LORazepam  1 mg Intramuscular Q6H PRN Max 3/day Virginia Mccullough, JHONATAN      LORazepam  1 mg Oral Q6H PRN Max 3/day JHONATAN Anne      losartan  100 mg Oral Daily JHONATAN Fields      melatonin  3 mg Oral HS Frederick Smith MD      naltrexone  50 mg Oral Daily Anatoly Prajapati, DO      OLANZapine  10 mg Oral Q6H PRN Frederick Smith MD      Or    OLANZapine  10 mg Intramuscular Q6H PRN Frederick Smith MD      OLANZapine  5 mg Oral Q6H PRN Frederick Smith MD      Or    OLANZapine  5 mg Intramuscular Q6H PRN Frederick Smith MD      OLANZapine  2.5 mg Oral Q3H PRN Frederick Smith MD      OXcarbazepine  450 mg Oral Q12H [facility] Frederick Smith MD      oxybutynin  5 mg Oral BID JHONATAN Fields      [START ON 1/31/2024] paliperidone  234 mg IM- Deltoid Q4 weeks Frederick Smith MD      pantoprazole  40 mg Oral Early Morning JHONATAN Fields      polyethylene glycol  17 g Oral Daily PRN JHONATAN Anne      saccharomyces boulardii  250 mg Oral BID Anatoly Prajapati, DO      senna-docusate sodium  1 tablet Oral Daily PRN JHONATAN Anne      topiramate  200 mg Oral BID Anatoly Prajapati, DO      traZODone  50 mg Oral HS PRN JHONATAN Anne      warfarin  5 mg Oral Daily (warfarin) JHONATAN Berg         Risks / Benefits of Treatment:  Risks, benefits, and possible side effects of medications explained to patient and patient verbalizes understanding and agreement for treatment.    Counseling / Coordination of Care:  Patient's progress reviewed with nursing staff.  Medications, treatment progress and treatment plan reviewed with patient.  Supportive counseling provided to the patient.    Total floor/unit time spent today 25 minutes. Greater  than 50% of total time was spent with the patient and / or family counseling and / or coordination of care. A description of the counseling / coordination of care: medication education, treatment plan, supportive therapy.    This note was completed in part utilizing Dragon dictation Software. Grammatical, translation, syntax errors, random word insertions, spelling mistakes, and incomplete sentences may be an occasional consequence of this system secondary to software limitations with voice recognition, ambient noise, and hardware issues. If you have any questions or concerns about the content, text, or information contained within the body of this dictation, please contact the provider for clarification

## 2024-01-29 NOTE — TREATMENT PLAN
TREATMENT PLAN REVIEW - Behavioral Health Blanca Mason 53 y.o. 1970 female MRN: [id number]    [facility] 6B [facility] Room / Bed: [facility] 651/[facility] 651-02 Encounter: [id number]          Admit Date/Time:  12/1/2023  8:47 PM    Treatment Team:   MD Amaury Danielle DO Karissa Marie Kormandy, JHONATAN Law RN Olivia Steward Terry L Trittenbach, COTA Jacqueline Almonte Stacie Searle, RAIN Longoria    Diagnosis: Principal Problem:    Schizoaffective disorder, bipolar type ([facility])  Active Problems:    Borderline personality disorder (HCC)    Benign essential hypertension    Edema    Hypothyroidism    MAG (obstructive sleep apnea)    Overactive bladder    Sjogren's syndrome ([facility])    Medical clearance for psychiatric admission    Anxiety    History of pulmonary embolism    Ingrown toenail      Patient Strengths/Assets: cooperative, communication skills, family ties, patient is on a voluntary commitment    Patient Barriers/Limitations: difficulty adapting, limited support system, poor interpersonal skills, poor past treatment response, substance abuse    Short Term Goals: decrease in anxiety symptoms, decrease in paranoid thoughts, decrease in psychotic symptoms, decrease in suicidal thoughts, decrease in level of agitation, ability to stay safe on the unit, ability to stay free of restraints, improvement in insight, improvement in reality testing, improvement in reasoning ability, mood stabilization, increase in socialization with peers on the unit, acceptance of need for psychiatric treatment, acceptance of psychiatric medications    Long Term Goals: stabilization of mood, free of suicidal thoughts, free of homicidal thoughts, no self abusive behavior, resolution of psychotic symptoms, improvement in reality testing, improved insight, acceptance of need for psychiatric medications, acceptance of need for  psychiatric follow up after discharge, acceptance of psychiatric diagnosis, appropriate interaction with family, establishment of family support upon discharge    Progress Towards Goals: continue psychiatric medications as prescribed, discharge planning    Recommended Treatment: medication management, patient medication education, group therapy, milieu therapy, continued Behavioral Health psychiatric evaluation/assessment process    Treatment Frequency: daily medication monitoring, group and milieu therapy daily, monitoring through interdisciplinary rounds, monitoring through weekly patient care conferences    Expected Discharge Date:  7 days    Discharge Plan: referral for outpatient medication management with a psychiatrist, referral for outpatient psychotherapy, referral to [facility]ive Community Treatment Team for close psychiatric monitoring, return to previous living arrangement, Intensive DBT    Treatment Plan Created/Updated By: Frederick Smith MD

## 2024-01-30 PROCEDURE — 99232 SBSQ HOSP IP/OBS MODERATE 35: CPT | Performed by: STUDENT IN AN ORGANIZED HEALTH CARE EDUCATION/TRAINING PROGRAM

## 2024-01-30 RX ORDER — OXYBUTYNIN CHLORIDE 5 MG/1
5 TABLET ORAL 2 TIMES DAILY
Qty: 60 TABLET | Refills: 1 | Status: SHIPPED | OUTPATIENT
Start: 2024-01-30 | End: 2024-03-30

## 2024-01-30 RX ORDER — SACCHAROMYCES BOULARDII 250 MG
250 CAPSULE ORAL 2 TIMES DAILY
Qty: 60 CAPSULE | Refills: 1 | Status: SHIPPED | OUTPATIENT
Start: 2024-01-30 | End: 2024-03-30

## 2024-01-30 RX ORDER — CLONIDINE HYDROCHLORIDE 0.2 MG/1
0.2 TABLET ORAL EVERY 12 HOURS SCHEDULED
Qty: 60 TABLET | Refills: 1 | Status: SHIPPED | OUTPATIENT
Start: 2024-01-30 | End: 2024-03-30

## 2024-01-30 RX ORDER — FUROSEMIDE 20 MG/1
20 TABLET ORAL DAILY
Qty: 30 TABLET | Refills: 1 | Status: SHIPPED | OUTPATIENT
Start: 2024-01-30 | End: 2024-03-30

## 2024-01-30 RX ORDER — LOSARTAN POTASSIUM 100 MG/1
100 TABLET ORAL DAILY
Qty: 30 TABLET | Refills: 1 | Status: SHIPPED | OUTPATIENT
Start: 2024-01-30 | End: 2024-03-30

## 2024-01-30 RX ORDER — OXCARBAZEPINE 150 MG/1
450 TABLET, FILM COATED ORAL EVERY 12 HOURS SCHEDULED
Qty: 180 TABLET | Refills: 1 | Status: SHIPPED | OUTPATIENT
Start: 2024-01-30 | End: 2024-03-30

## 2024-01-30 RX ORDER — LEVOTHYROXINE SODIUM 0.12 MG/1
125 TABLET ORAL
Qty: 30 TABLET | Refills: 1 | Status: SHIPPED | OUTPATIENT
Start: 2024-01-30 | End: 2024-03-30

## 2024-01-30 RX ORDER — BUPROPION HYDROCHLORIDE 450 MG/1
450 TABLET, FILM COATED, EXTENDED RELEASE ORAL DAILY
Qty: 30 TABLET | Refills: 1 | Status: SHIPPED | OUTPATIENT
Start: 2024-01-31 | End: 2024-03-31

## 2024-01-30 RX ORDER — LANOLIN ALCOHOL/MO/W.PET/CERES
3 CREAM (GRAM) TOPICAL
Qty: 30 TABLET | Refills: 0 | Status: SHIPPED | OUTPATIENT
Start: 2024-01-30 | End: 2024-03-30

## 2024-01-30 RX ORDER — PANTOPRAZOLE SODIUM 40 MG/1
40 TABLET, DELAYED RELEASE ORAL
Qty: 30 TABLET | Refills: 1 | Status: SHIPPED | OUTPATIENT
Start: 2024-01-31 | End: 2024-03-31

## 2024-01-30 RX ORDER — ATOMOXETINE 40 MG/1
40 CAPSULE ORAL DAILY
Qty: 30 CAPSULE | Refills: 1 | Status: SHIPPED | OUTPATIENT
Start: 2024-01-30 | End: 2024-03-30

## 2024-01-30 RX ORDER — WARFARIN SODIUM 5 MG/1
5 TABLET ORAL
Qty: 90 TABLET | Refills: 0 | Status: SHIPPED | OUTPATIENT
Start: 2024-01-30 | End: 2024-04-29

## 2024-01-30 RX ORDER — NALTREXONE HYDROCHLORIDE 50 MG/1
50 TABLET, FILM COATED ORAL DAILY
Qty: 30 TABLET | Refills: 1 | Status: SHIPPED | OUTPATIENT
Start: 2024-01-30 | End: 2024-03-30

## 2024-01-30 RX ADMIN — CLONIDINE HYDROCHLORIDE 0.2 MG: 0.1 TABLET ORAL at 08:18

## 2024-01-30 RX ADMIN — NALTREXONE HYDROCHLORIDE 50 MG: 50 TABLET, FILM COATED ORAL at 08:18

## 2024-01-30 RX ADMIN — FUROSEMIDE 20 MG: 20 TABLET ORAL at 08:18

## 2024-01-30 RX ADMIN — CYANOCOBALAMIN TAB 1000 MCG 1000 MCG: 1000 TAB at 08:18

## 2024-01-30 RX ADMIN — BUPROPION HYDROCHLORIDE 450 MG: 300 TABLET, EXTENDED RELEASE ORAL at 08:18

## 2024-01-30 RX ADMIN — Medication 250 MG: at 17:01

## 2024-01-30 RX ADMIN — ATOMOXETINE 40 MG: 40 CAPSULE ORAL at 08:18

## 2024-01-30 RX ADMIN — PANTOPRAZOLE SODIUM 40 MG: 40 TABLET, DELAYED RELEASE ORAL at 05:19

## 2024-01-30 RX ADMIN — TOPIRAMATE 200 MG: 100 TABLET, FILM COATED ORAL at 08:18

## 2024-01-30 RX ADMIN — Medication 250 MG: at 08:18

## 2024-01-30 RX ADMIN — WARFARIN SODIUM 5 MG: 5 TABLET ORAL at 17:01

## 2024-01-30 RX ADMIN — LEVOTHYROXINE SODIUM 125 MCG: 125 TABLET ORAL at 05:19

## 2024-01-30 RX ADMIN — TOPIRAMATE 200 MG: 100 TABLET, FILM COATED ORAL at 17:01

## 2024-01-30 RX ADMIN — OXYBUTYNIN CHLORIDE 5 MG: 5 TABLET ORAL at 09:17

## 2024-01-30 RX ADMIN — OXCARBAZEPINE 450 MG: 150 TABLET, FILM COATED ORAL at 21:14

## 2024-01-30 RX ADMIN — IBUPROFEN 600 MG: 600 TABLET ORAL at 03:23

## 2024-01-30 RX ADMIN — CLONIDINE HYDROCHLORIDE 0.2 MG: 0.1 TABLET ORAL at 21:14

## 2024-01-30 RX ADMIN — OXYBUTYNIN CHLORIDE 5 MG: 5 TABLET ORAL at 17:01

## 2024-01-30 RX ADMIN — LOSARTAN POTASSIUM 100 MG: 50 TABLET, FILM COATED ORAL at 08:18

## 2024-01-30 RX ADMIN — OXCARBAZEPINE 450 MG: 150 TABLET, FILM COATED ORAL at 08:18

## 2024-01-30 RX ADMIN — MELATONIN TAB 3 MG 3 MG: 3 TAB at 21:15

## 2024-01-30 RX ADMIN — TRAZODONE HYDROCHLORIDE 50 MG: 50 TABLET ORAL at 23:32

## 2024-01-30 NOTE — NURSING NOTE
"Patient visible on the unit sitting with peers in the dining room for the majority of the shift, social with staff and peers. Patient brightens on approach, reporting excitement for pending discharge and says she's \"counting the days\". Patient currently denies depression, anxiety, SI/HI/AVH. Patient compliant with medications and meals, appetite good. No further signs of distress noted.  "

## 2024-01-30 NOTE — PROGRESS NOTES
01/30/24 1300   Activity/Group Checklist   Group Pet therapy   Attendance Attended   Attendance Duration (min) 16-30   Interactions Interacted appropriately  (Pt. monika stating that she has met most of the pet therapy dogs now.Also pt. joking fun at self for  sitting in the corner of dayroom.)   Affect/Mood Bright;Normal range   Goals Achieved Displayed empathy;Able to engage in interactions;Able to listen to others;Identified feelings

## 2024-01-30 NOTE — PLAN OF CARE
Problem: Risk for Self Injury/Neglect  Goal: Treatment Goal: Remain safe during length of stay, learn and adopt new coping skills, and be free of self-injurious ideation, impulses and acts at the time of discharge  Outcome: Progressing  Goal: Verbalize thoughts and feelings  Description: Interventions:  - Assess and re-assess patient's lethality and potential for self-injury  - Engage patient in 1:1 interactions, daily, for a minimum of 15 minutes  - Encourage patient to express feelings, fears, frustrations, hopes  - Establish rapport/trust with patient   Outcome: Progressing  Goal: Refrain from harming self  Description: Interventions:  - Monitor patient closely, per order  - Develop a trusting relationship  - Supervise medication ingestion, monitor effects and side effects   Outcome: Progressing  Goal: Attend and participate in unit activities, including therapeutic, recreational, and educational groups  Description: Interventions:  - Provide therapeutic and educational activities daily, encourage attendance and participation, and document same in the medical record  - Obtain collateral information, encourage visitation and family involvement in care   Outcome: Progressing  Goal: Recognize maladaptive responses and adopt new coping mechanisms  Outcome: Progressing  Goal: Complete daily ADLs, including personal hygiene independently, as able  Description: Interventions:  - Observe, teach, and assist patient with ADLS  - Monitor and promote a balance of rest/activity, with adequate nutrition and elimination  Outcome: Progressing     Problem: Depression  Goal: Treatment Goal: Demonstrate behavioral control of depressive symptoms, verbalize feelings of improved mood/affect, and adopt new coping skills prior to discharge  Outcome: Progressing  Goal: Verbalize thoughts and feelings  Description: Interventions:  - Assess and re-assess patient's level of risk   - Engage patient in 1:1 interactions, daily, for a minimum  of 15 minutes   - Encourage patient to express feelings, fears, frustrations, hopes   Outcome: Progressing  Goal: Refrain from harming self  Description: Interventions:  - Monitor patient closely, per order   - Supervise medication ingestion, monitor effects and side effects   Outcome: Progressing  Goal: Refrain from isolation  Description: Interventions:  - Develop a trusting relationship   - Encourage socialization   Outcome: Progressing  Goal: Refrain from self-neglect  Outcome: Progressing  Goal: Complete daily ADLs, including personal hygiene independently, as able  Description: Interventions:  - Observe, teach, and assist patient with ADLS  -  Monitor and promote a balance of rest/activity, with adequate nutrition and elimination   Outcome: Progressing     Problem: Anxiety  Goal: Anxiety is at manageable level  Description: Interventions:  - Assess and monitor patient's anxiety level.   - Monitor for signs and symptoms (heart palpitations, chest pain, shortness of breath, headaches, nausea, feeling jumpy, restlessness, irritable, apprehensive).   - Collaborate with interdisciplinary team and initiate plan and interventions as ordered.  - Logan patient to unit/surroundings  - Explain treatment plan  - Encourage participation in care  - Encourage verbalization of concerns/fears  - Identify coping mechanisms  - Assist in developing anxiety-reducing skills  - Administer/offer alternative therapies  - Limit or eliminate stimulants  Outcome: Progressing     Problem: Risk for Violence/Aggression Toward Others  Goal: Treatment Goal: Refrain from acts of violence/aggression during length of stay, and demonstrate improved impulse control at the time of discharge  Outcome: Progressing  Goal: Verbalize thoughts and feelings  Description: Interventions:  - Assess and re-assess patient's level of risk, every waking shift  - Engage patient in 1:1 interactions, daily, for a minimum of 15 minutes   - Allow patient to express  feelings and frustrations in a safe and non-threatening manner   - Establish rapport/trust with patient   Outcome: Progressing  Goal: Refrain from harming others  Outcome: Progressing  Goal: Refrain from destructive acts on the environment or property  Outcome: Progressing  Goal: Control angry outbursts  Description: Interventions:  - Monitor patient closely, per order  - Ensure early verbal de-escalation  - Monitor prn medication needs  - Set reasonable/therapeutic limits, outline behavioral expectations, and consequences   - Provide a non-threatening milieu, utilizing the least restrictive interventions   Outcome: Progressing  Goal: Identify appropriate positive anger management techniques  Description: Interventions:  - Offer anger management and coping skills groups   - Staff will provide positive feedback for appropriate anger control  Outcome: Progressing

## 2024-01-30 NOTE — BH TRANSITION RECORD
Contact Information: If you have any questions, concerns, pended studies, tests and/or procedures, or emergencies regarding your inpatient behavioral health visit. Please contact [place] [facility] unit 6B [phone] and ask to speak to a , nurse or physician. A contact is available 24 hours/ 7 days a week at this number.     Summary of Procedures Performed During your Stay:  Below is a list of major procedures performed during your hospital stay and a summary of results:  - Cardiac Procedures/Studies: ECG- Normal Sinus Rhythm .    Pending Studies (From admission, onward)       Start     Ordered    02/05/24 0600  Protime-INR  Morning draw         01/29/24 0846                  Please follow up on the above pending studies with your PCP and/or referring provider.

## 2024-01-30 NOTE — NURSING NOTE
Pt is visible and social in the milieu. She is feeling excited about her upcoming discharge. Med/meal compliant. No aggressive behaviors noted. Pt able to make needs known.

## 2024-01-30 NOTE — PROGRESS NOTES
01/30/24 0804   Team Meeting   Meeting Type Daily Rounds   Initial Conference Date 01/30/24   Next Conference Date 01/31/24   Team Members Present   Team Members Present Physician;Nurse;;;Occupational Therapist   Physician Team Member Dr Smith, SHEFALI Lovell   Nursing Team Member Jennifer   Care Management Team Member Atiya   Social Work Team Member Dalia TALBOT Team Member Yeison   Patient/Family Present   Patient Present No   Patient's Family Present No     Add Caroline - pharmacy: discharge Thursday, doing well, SNOWDEN tomorrow.

## 2024-01-30 NOTE — PROGRESS NOTES
01/30/24 0941   Team Meeting   Meeting Type Tx Team Meeting   Initial Conference Date 01/29/24   Next Conference Date 02/26/24   Team Members Present   Team Members Present Physician;Nurse;   Physician Team Member Dr Smith   Nursing Team Member Reyes   Care Management Team Member Atiya   Patient/Family Present   Patient Present Yes   Patient's Family Present No     Met with the patient to go over their treatment plan. Goals discussed were to have a decrease in anxiety symptoms, decrease in paranoid thoughts, decrease in psychotic symptoms, decrease in suicidal thoughts, decrease in level of agitation, ability to stay safe on the unit, ability to stay free of restraints, improvement in insight, improvement in reality testing, improvement in reasoning ability, mood stabilization, increase in socialization with peers on the unit, acceptance of need for psychiatric treatment, acceptance of psychiatric medications. Goals can be attained through medication management and group/milieu therapy.     Patient agrees with their treatment plan.

## 2024-01-30 NOTE — TREATMENT TEAM
Pt bright, future orientated and hopeful of d/c.  Pt noted she has communicated with her dad about feeding the cat and supplies.  Pt aware of d.c. needs.  Pt noted she has read 5 books while being hospitalized and become a good reader.  Pt focused on her coloring projects.  Pt indicated she needs to speak with CM about d/c times.  Pt has completed 2 relapse prevention plans.  Pt has attended groups.      01/30/24 1400   Activity/Group Checklist   Group Admission/Discharge   Attendance Attended   Attendance Duration (min) 16-30   Interactions Interacted appropriately   Affect/Mood Bright   Goals Achieved Identified feelings;Identified relapse prevention strategies;Discussed coping strategies;Able to engage in interactions;Able to listen to others;Able to self-disclose;Identified resources and support systems;Discussed discharge plans

## 2024-01-30 NOTE — PROGRESS NOTES
Progress Note - Behavioral Health   Blanca Mason 53 y.o. female MRN: [id number]  Unit/Bed#: [facility] 651-02 Encounter: [id number]    Patient was seen today for continuation of care, records reviewed and patient was discussed with the morning case review team.    Blanca was seen today for psychiatric follow-up.  On assessment today, Blanca was pleasant and noted to be smiling during her interview.  Stating that she looks forward to seeing her cat and father, patient continues to be goal oriented and optimistic about discharge later this week.  No depression or anxiety currently verbalized, patient continues to practice good coping skills to work on her anger management worksheets.  Tolerating all medication changes well, Invega Sustenna to be given tomorrow 1/31/2024.  No medication changes be made at this time.  Tentative discharge 2/1/24.    Blanca denies acute suicidal/self-harm ideation/intent/plan upon direct inquiry at this time.  Blanca remains behaviorally appropriate, no agitation or aggression noted on exam or in report.  Blanca also denies HI/AH/VH, and does not appear overtly manic.  No overt delusions or paranoia are verbalized. Impulse control is intact.  Blanca remains adherent to her current psychotropic medication regimen and denies any side effects from medications, as well as none noted on exam.    Vitals:  Vitals:    01/30/24 0801   BP: 137/79   Pulse: 74   Resp: 16   Temp: 98.1 °F (36.7 °C)   SpO2: 93%       Laboratory Results:  I have personally reviewed all pertinent laboratory/tests results.  Most Recent Labs:   Lab Results   Component Value Date    WBC 7.52 01/29/2024    RBC 4.69 01/29/2024    HGB 13.8 01/29/2024    HCT 43.4 01/29/2024     01/29/2024    RDW 14.1 01/29/2024    TOTANEUTABS 4.73 11/19/2023    NEUTROABS 4.90 01/29/2024    SODIUM 138 01/29/2024    K 4.0 01/29/2024     01/29/2024    CO2 26 01/29/2024    BUN 13 01/29/2024    CREATININE 0.62 01/29/2024    GLUC 88 01/29/2024    GLUF  88 01/29/2024    CALCIUM 8.6 01/29/2024    AST 13 12/02/2023    ALT 13 12/02/2023    ALKPHOS 55 12/02/2023    TP 6.0 (L) 12/02/2023    ALB 3.5 12/02/2023    TBILI 0.25 12/02/2023    CHOLESTEROL 207 (H) 01/29/2024    HDL 48 (L) 01/29/2024    TRIG 162 (H) 01/29/2024    LDLCALC 127 (H) 01/29/2024    NONHDLC 159 01/29/2024    VALPROICTOT 23 (L) 11/10/2023    AMMONIA 63 11/06/2023    IAD3WGEHJATA 4.324 12/02/2023    FREET4 0.81 11/06/2023    T3FREE 2.27 (L) 10/16/2019    PREGUR negative 07/13/2022    PREGSERUM Negative 11/06/2023    RPR Non-Reactive 07/17/2022    HGBA1C 5.7 (H) 01/29/2024     01/29/2024       Psychiatric Review of Systems:  Behavior over the last 24 hours:  unchanged.   Sleep: good  Appetite: good  Medication side effects: none  ROS: no complaints, denies shortness of breath or chest pain and all other systems are negative for acute changes    Mental Status Evaluation:  Appearance:  adequate grooming   Behavior:  cooperative, calm   Speech:  normal rate and volume   Mood:  less irritable   Affect:  constricted   Thought Process:  linear   Thought Content:  no overt delusions   Perceptual Disturbances: denies auditory hallucinations when asked, does not appear responding to internal stimuli   Risk Potential: Suicidal ideation - None  Homicidal ideation - None  Potential for aggression - No   Memory:  recent and remote memory grossly intact   Sensorium  person, place, and time/date      Consciousness:  alert and awake   Attention: attention span and concentration are age appropriate   Insight:  improving   Judgment: improving   Gait/Station: normal gait/station   Motor Activity: no abnormal movements   Progress Toward Goals:   Blanca is progressing towards goals of inpatient psychiatric treatment by continued medication compliance and is attending therapeutic modalities on the milieu. However, the patient continues to require inpatient psychiatric hospitalization for continued medication management  and titration to optimize symptom reduction, improve sleep hygiene, and demonstrate adequate self-care.    Assessment/Plan   Principal Problem:    Schizoaffective disorder, bipolar type (HCC)  Active Problems:    Benign essential hypertension    Edema    Hypothyroidism    MAG (obstructive sleep apnea)    Overactive bladder    Sjogren's syndrome (HCC)    Medical clearance for psychiatric admission    Anxiety    Borderline personality disorder (HCC)    History of pulmonary embolism    Ingrown toenail      Recommended Treatment: Treatment plan and medication changes discussed and per the attending physician the plan is:    1.Continue with group therapy, milieu therapy and occupational therapy  2.Behavioral Health checks every 7 minutes  3.Continue frequent safety checks and vitals per unit protocol  4.Continue with SLIM medical management as indicated  5.Continue with current medication regimen  6.Will review labs in the a.m.  7.Disposition Planning: Discharge planning and efforts remain ongoing    Behavioral Health Medications: all current active meds have been reviewed and continue current psychiatric medications.  Current Facility-Administered Medications   Medication Dose Route Frequency Provider Last Rate    aluminum-magnesium hydroxide-simethicone  30 mL Oral Q4H PRN JHONATAN Fields      atoMOXetine  40 mg Oral Daily Anatoly Prajapati, DO      buPROPion  450 mg Oral Daily Anatoly Prajapati, DO      cloNIDine  0.2 mg Oral Q12H [facility] JHONATAN Anne      cyanocobalamin  1,000 mcg Oral Daily JHONATAN Berg      cyanocobalamin  1,000 mcg Intramuscular Q30 Days JHONATAN Berg      furosemide  20 mg Oral Daily JHONATAN Fields      hydrocortisone   Topical 4x Daily PRN JHONATAN Berg      hydrOXYzine HCL  25 mg Oral Q6H PRN Max 4/day LAURA AnneNP      hydrOXYzine HCL  50 mg Oral Q6H PRN Max 4/day Virginia Mccullough, LAURANP      ibuprofen  200 mg Oral Q6H  PRN Virginia Mccullough, JHONATAN      ibuprofen  400 mg Oral Q6H PRN Virginia Mccullough, CRNP      ibuprofen  600 mg Oral Q8H PRN Virginia Mccullough, JHONATAN      levothyroxine  125 mcg Oral Early Morning Wendy Anderson, JHONATAN      loperamide  2 mg Oral Q4H PRN Anatoly Prajapati, DO      LORazepam  1 mg Intramuscular Q6H PRN Max 3/day Virginia Mccullough, CRNP      LORazepam  1 mg Oral Q6H PRN Max 3/day Virginia Mccullough, JHONATAN      losartan  100 mg Oral Daily Wendy Anderson, LAURANP      melatonin  3 mg Oral HS Frederick Smith MD      naltrexone  50 mg Oral Daily Anatoly Prajapati, DO      OLANZapine  10 mg Oral Q6H PRN Frederick Smith MD      Or    OLANZapine  10 mg Intramuscular Q6H PRN Frederick Smith MD      OLANZapine  5 mg Oral Q6H PRN Frederick Smith MD      Or    OLANZapine  5 mg Intramuscular Q6H PRN Frederick Smith MD      OLANZapine  2.5 mg Oral Q3H PRN Frederick Smith MD      OXcarbazepine  450 mg Oral Q12H [facility] Frederick Smith MD      oxybutynin  5 mg Oral BID JHONATAN Fields      [START ON 1/31/2024] paliperidone  234 mg IM- Deltoid Q4 weeks Frederick Smith MD      pantoprazole  40 mg Oral Early Morning JHONATAN Fields      polyethylene glycol  17 g Oral Daily PRN JHONATAN Anne      saccharomyces boulardii  250 mg Oral BID Anatoly Prajapati, DO      senna-docusate sodium  1 tablet Oral Daily PRN JHONATAN Anne      topiramate  200 mg Oral BID Anatoly Prajapati, DO      traZODone  50 mg Oral HS PRN JHONATAN Anne      warfarin  5 mg Oral Daily (warfarin) JHONATAN Berg         Risks / Benefits of Treatment:  Risks, benefits, and possible side effects of medications explained to patient and patient verbalizes understanding and agreement for treatment.    Counseling / Coordination of Care:  Patient's progress reviewed with nursing staff.  Medications, treatment progress and treatment plan reviewed with patient.  Supportive counseling provided to the  patient.    Total floor/unit time spent today 25 minutes. Greater than 50% of total time was spent with the patient and / or family counseling and / or coordination of care. A description of the counseling / coordination of care: medication education, treatment plan, supportive therapy.    This note was completed in part utilizing Dragon dictation Software. Grammatical, translation, syntax errors, random word insertions, spelling mistakes, and incomplete sentences may be an occasional consequence of this system secondary to software limitations with voice recognition, ambient noise, and hardware issues. If you have any questions or concerns about the content, text, or information contained within the body of this dictation, please contact the provider for clarification

## 2024-01-31 RX ADMIN — OXCARBAZEPINE 450 MG: 150 TABLET, FILM COATED ORAL at 08:15

## 2024-01-31 RX ADMIN — WARFARIN SODIUM 5 MG: 5 TABLET ORAL at 17:15

## 2024-01-31 RX ADMIN — PANTOPRAZOLE SODIUM 40 MG: 40 TABLET, DELAYED RELEASE ORAL at 05:54

## 2024-01-31 RX ADMIN — OXYBUTYNIN CHLORIDE 5 MG: 5 TABLET ORAL at 17:18

## 2024-01-31 RX ADMIN — PALIPERIDONE PALMITATE 234 MG: 234 INJECTION INTRAMUSCULAR at 11:01

## 2024-01-31 RX ADMIN — LEVOTHYROXINE SODIUM 125 MCG: 125 TABLET ORAL at 05:54

## 2024-01-31 RX ADMIN — FUROSEMIDE 20 MG: 20 TABLET ORAL at 08:15

## 2024-01-31 RX ADMIN — CLONIDINE HYDROCHLORIDE 0.2 MG: 0.1 TABLET ORAL at 21:57

## 2024-01-31 RX ADMIN — TOPIRAMATE 200 MG: 100 TABLET, FILM COATED ORAL at 08:14

## 2024-01-31 RX ADMIN — MELATONIN TAB 3 MG 3 MG: 3 TAB at 21:56

## 2024-01-31 RX ADMIN — Medication 250 MG: at 17:15

## 2024-01-31 RX ADMIN — CLONIDINE HYDROCHLORIDE 0.2 MG: 0.1 TABLET ORAL at 08:15

## 2024-01-31 RX ADMIN — CYANOCOBALAMIN TAB 1000 MCG 1000 MCG: 1000 TAB at 08:15

## 2024-01-31 RX ADMIN — BUPROPION HYDROCHLORIDE 450 MG: 300 TABLET, EXTENDED RELEASE ORAL at 08:14

## 2024-01-31 RX ADMIN — TRAZODONE HYDROCHLORIDE 50 MG: 50 TABLET ORAL at 23:41

## 2024-01-31 RX ADMIN — NALTREXONE HYDROCHLORIDE 50 MG: 50 TABLET, FILM COATED ORAL at 08:14

## 2024-01-31 RX ADMIN — Medication 250 MG: at 08:15

## 2024-01-31 RX ADMIN — OXYBUTYNIN CHLORIDE 5 MG: 5 TABLET ORAL at 08:15

## 2024-01-31 RX ADMIN — OXCARBAZEPINE 450 MG: 150 TABLET, FILM COATED ORAL at 21:56

## 2024-01-31 RX ADMIN — TOPIRAMATE 200 MG: 100 TABLET, FILM COATED ORAL at 17:15

## 2024-01-31 RX ADMIN — ATOMOXETINE 40 MG: 40 CAPSULE ORAL at 08:15

## 2024-01-31 RX ADMIN — LOSARTAN POTASSIUM 100 MG: 50 TABLET, FILM COATED ORAL at 08:15

## 2024-01-31 NOTE — DISCHARGE INSTR - OTHER ORDERS
You are being discharged to [address] [place] [address], [place] [address], [place] [address].     Triggers you have identified during your hospitalization that led to your admission suicidal ideation, and distressed mood. Coping skills you have identified during your hospitalization include using stress balls, talking to someone in the ACT team, art projects, and playing your guitars. If you are unable to deal with your distressed mood alone please contact the ACT team. If that is not effective and you continue to have suicidal ideation, distressed mood, are feeling overwhelmed, and/or in crisis please contact [facility] Crisis at [phone], dial [phone] or go to the nearest emergency center.     [facility] Warm Line: [phone] or [phone] - 7 days a week from 6 am to 2 am.     A warm line is a phone service for people who are looking for support, engagement, and hope during non-emergency mental health struggles or distress. A warm line is staffed by peers who have personal or lived experience with mental health disorders and who can listen, share, and offer a sense of recovery. A warm line is different from a crisis line or hotline, which are intended for emergency situations    National Suicide Hotline: 918.882.9755    Crisis Text Line: 032894      Kimberli, or Rosemarie, our Behavioral Health Nurse Navigators, will be calling you after your discharge, on the phone number that you provided.  They will be available as an additional support, if needed.     If you wish to speak with one of them, you may contact Kimberli at [phone] or Rosemarie at [phone].

## 2024-01-31 NOTE — PROGRESS NOTES
01/31/24 0759   Team Meeting   Meeting Type Daily Rounds   Initial Conference Date 01/31/24   Next Conference Date 02/01/24   Team Members Present   Team Members Present Physician;Nurse;;;Occupational Therapist   Physician Team Member Dr Smith, Dr Epps, Dr Quispe, SHEFALI Almaguer   Nursing Team Member Jennifer   Care Management Team Member Atiya   Social Work Team Member Dalia TALBOT Team Member Yeison   Patient/Family Present   Patient Present No   Patient's Family Present No     Discharge tomorrow, SNOWDEN today.

## 2024-01-31 NOTE — NURSING NOTE
Patient was visible and social with select peers. Denies all psych s/s. No complaints offered. No behaviors noted. Had 100% for dinner. Took her medications. Safety checks ongoing.

## 2024-01-31 NOTE — PROGRESS NOTES
01/31/24 1732   Discharge Planning   Living Arrangements Lives Alone   Support Systems Parent;Family members;Self;Psychiatrist;/;Therapist;Other (Comment)  ([facility])   Type of Current Residence Private residence   Current Home Care Services No   Other Referral/Resources/Interventions Provided:   Referrals Provided: Crisis Hotline;Other (Specify)  (Warm Line)   Discharge Communications   Discharge planning discussed with: Patient and [facility]   [place] of Choice Yes   IMM Given (Date): 01/31/24   IMM Given to: Patient   Transportation at Discharge? No   Transport at Discharge  Auto with designated    Contacts   Patient Contacts Luigi Mason (father)   Relationship to Patient: Family   Contact Method Phone   Phone Number [phone]   Reason/Outcome Continuity of Care;Emergency Contact;Discharge Planning   [facility]   Would you like to participate in our [facility] service program?   No - Declined

## 2024-01-31 NOTE — NURSING NOTE
Pt visible on the unit sitting with peers in the patient lounge. Pt is calm and cooperative. Pt denies any psych s/s at this time. Pt denies any unmet needs. Continual rounding in place.

## 2024-01-31 NOTE — CASE MANAGEMENT
Case Management Discharge Planning Note    Patient name Blanca Mason  Location [facility] 651/[facility] 651-02 MRN [id number]  : 1970 Date 2024       Current Admission Date: 2023  Current Admission Diagnosis:Schizoaffective disorder, bipolar type ([facility])   Patient Active Problem List    Diagnosis Date Noted    Ingrown toenail 2023    History of DVT (deep vein thrombosis) 2023    Grief 2023    Toxic encephalopathy 2023    Lymphedema 10/30/2023    Acute saddle pulmonary embolism ([facility]) 10/16/2023    Acute deep vein thrombosis of lower leg, left ([facility]) 10/16/2023    Polysubstance abuse ([facility]) 2023    Restless leg syndrome 2023    Intentional overdose ([facility]) 2023    Tremors of nervous system 2023    History of pulmonary embolism 2023    Elevated troponin 2023    Cervicalgia 08/10/2023    Bilateral leg edema 2023    Chronic migraine without aura without status migrainosus, not intractable 2023    Chronic eczematous otitis externa of both ears 2023    Intentional self-harm by unspecified sharp object, sequela ([facility]) 2023    Suicidal deliberate poisoning ([facility]) 2022    Knee pain, right 2022    Platelets decreased ([facility]) 2022    Diarrhea 2022    Ecchymosis 2022    Anxiety 2022    Borderline personality disorder ([facility]) 2022    Contusion of elbow 2021    Cognitive impairment 10/07/2021    Substance abuse ([facility]) 2021    Potential for cognitive impairment 2021    Mood disorder ([facility]) 2021    Chronic tension-type headache, intractable 03/10/2021    History of COVID-19 2020    Memory difficulties 2020    BMI 45.0-49.9, adult ([facility]) 2020    Mixed hyperlipidemia 10/30/2019    COPD (chronic obstructive pulmonary disease) ([facility]) 2019    Major depressive disorder 2019    Sjogren's syndrome ([facility]) 2019    MAG (obstructive sleep apnea) 2018     Overactive bladder 06/08/2017    Schizoaffective disorder, bipolar type (HCC) 03/17/2017    Hypothyroidism 03/17/2017    Family history of renal cancer 04/26/2016    Yan's esophagus determined by biopsy 10/19/2015    Medical clearance for psychiatric admission 09/14/2015    Urinary incontinence 03/31/2015    Benign essential hypertension 07/02/2014    Edema 03/26/2014    Chronic low back pain 04/07/2012      LOS (days): 61  Geometric Mean LOS (GMLOS) (days):   Days to GMLOS:     OBJECTIVE:  Risk of Unplanned Readmission Score: 76.06         Current admission status: Inpatient Psych   Preferred Pharmacy:   [facility] - [place], [place] - [address]  [address]  [address]  [place] [address]  Phone: [phone] Fax: [phone]    Primary Care Provider: JHONATAN Armando    Primary Insurance: MEDICARE  Secondary Insurance: [facility]    DISCHARGE DETAILS:    Discharge planning discussed with:: Patient and [facility]  [place] of Choice: Yes                   Contacts  Patient Contacts: Luigi Mason (father)  Relationship to Patient:: Family  Contact Method: Phone  Phone Number: [phone]  Reason/Outcome: Continuity of Care, Emergency Contact, Discharge Planning              Other Referral/Resources/Interventions Provided:  Referrals Provided:: Crisis Hotline, Other (Specify) (Warm Line)    Would you like to participate in our [facility] service program?  : No - Declined          Transport at Discharge : Auto with designated                              IMM Given (Date):: 01/31/24  IMM Given to:: Patient          CM met with PT for PT check in. PT was pleasant in conversation, reviewed discharge plan along with follow up care and supports, PT in agreement with all. PT denies si/hi/ah/vh anxiety and depression. PT expressed gratitude. Reviewed IMM, PT declined right to appeal, feels she is ready for discharge and signed. Blanca will be continuing with [facility] and then  doing DBT with them as well. She has shown improvement here on the unit with her impulse control.     Aftercare:  [facility] services will continue, they will be monitoring the patient's medications, she will only be allowed a 7 day supply to be sure she is not taking too many, She also has an [place] that they will resume there at ACT. She is starting DBT classes with them and will be monitored more closely for the time being to be sure she is safe.

## 2024-01-31 NOTE — NURSING NOTE
Pt is bright and social in the milieu. She is looking forward to discharge tomorrow. Pt is med/meal compliant. No agitation or aggressive behaviors noted. Pt currently denies any unmet needs.

## 2024-01-31 NOTE — PROGRESS NOTES
Progress Note - Behavioral Health   Blanca Mason 53 y.o. female MRN: [id number]  Unit/Bed#: [facility] 651-02 Encounter: [id number]    Patient was seen today for continuation of care, records reviewed and patient was discussed with the morning case review team.    Blanca was seen today for psychiatric follow-up.  On assessment today, Blanca was pleasant and bright on approach.  Optimistic about discharge tomorrow, patient states that she is ready and looks forward to being with her family.  No depression or anxiety currently verbalized.  Invega Sustenna 234 mg IM given today to assist with schizoaffective disorder, no adverse effects reported.  We will continue with plan for discharge tomorrow with follow-up ACT services as well as DBT therapy.    Blanca denies acute suicidal/self-harm ideation/intent/plan upon direct inquiry at this time.  Blanca remains behaviorally appropriate, no agitation or aggression noted on exam or in report.  Blanca also denies HI/AH/VH, and does not appear overtly manic.  No overt delusions or paranoia are verbalized. Impulse control is intact.  Blanca remains adherent to her current psychotropic medication regimen and denies any side effects from medications, as well as none noted on exam.    Vitals:  Vitals:    01/31/24 0752   BP: 123/79   Pulse: 97   Resp: 18   Temp: 98 °F (36.7 °C)   SpO2: 95%       Laboratory Results:  I have personally reviewed all pertinent laboratory/tests results.  Most Recent Labs:   Lab Results   Component Value Date    WBC 7.52 01/29/2024    RBC 4.69 01/29/2024    HGB 13.8 01/29/2024    HCT 43.4 01/29/2024     01/29/2024    RDW 14.1 01/29/2024    TOTANEUTABS 4.73 11/19/2023    NEUTROABS 4.90 01/29/2024    SODIUM 138 01/29/2024    K 4.0 01/29/2024     01/29/2024    CO2 26 01/29/2024    BUN 13 01/29/2024    CREATININE 0.62 01/29/2024    GLUC 88 01/29/2024    GLUF 88 01/29/2024    CALCIUM 8.6 01/29/2024    AST 13 12/02/2023    ALT 13 12/02/2023    ALKPHOS 55  12/02/2023    TP 6.0 (L) 12/02/2023    ALB 3.5 12/02/2023    TBILI 0.25 12/02/2023    CHOLESTEROL 207 (H) 01/29/2024    HDL 48 (L) 01/29/2024    TRIG 162 (H) 01/29/2024    LDLCALC 127 (H) 01/29/2024    NONHDLC 159 01/29/2024    VALPROICTOT 23 (L) 11/10/2023    AMMONIA 63 11/06/2023    ZPC8LCPNJJKQ 4.324 12/02/2023    FREET4 0.81 11/06/2023    T3FREE 2.27 (L) 10/16/2019    PREGUR negative 07/13/2022    PREGSERUM Negative 11/06/2023    RPR Non-Reactive 07/17/2022    HGBA1C 5.7 (H) 01/29/2024     01/29/2024       Psychiatric Review of Systems:  Behavior over the last 24 hours:  unchanged.   Sleep: good  Appetite: good  Medication side effects: none  ROS: no complaints, denies shortness of breath or chest pain and all other systems are negative for acute changes    Mental Status Evaluation:  Appearance:  adequate grooming   Behavior:  cooperative, calm   Speech:  normal rate and volume   Mood:  less irritable   Affect:  constricted   Thought Process:  linear   Thought Content:  no overt delusions   Perceptual Disturbances: denies auditory hallucinations when asked, does not appear responding to internal stimuli   Risk Potential: Suicidal ideation - None  Homicidal ideation - None  Potential for aggression - No   Memory:  recent and remote memory grossly intact   Sensorium  person, place, and time/date      Consciousness:  alert and awake   Attention: attention span and concentration are age appropriate   Insight:  improving   Judgment: improving   Gait/Station: normal gait/station   Motor Activity: no abnormal movements   Progress Toward Goals:   Blanca is progressing towards goals of inpatient psychiatric treatment by continued medication compliance and is attending therapeutic modalities on the milieu. However, the patient continues to require inpatient psychiatric hospitalization for continued medication management and titration to optimize symptom reduction, improve sleep hygiene, and demonstrate adequate  self-care.    Assessment/Plan   Principal Problem:    Schizoaffective disorder, bipolar type (HCC)  Active Problems:    Benign essential hypertension    Edema    Hypothyroidism    MAG (obstructive sleep apnea)    Overactive bladder    Sjogren's syndrome (HCC)    Medical clearance for psychiatric admission    Anxiety    Borderline personality disorder (HCC)    History of pulmonary embolism    Ingrown toenail      Recommended Treatment: Treatment plan and medication changes discussed and per the attending physician the plan is:    1.Continue with group therapy, milieu therapy and occupational therapy  2.Behavioral Health checks every 7 minutes  3.Continue frequent safety checks and vitals per unit protocol  4.Continue with SLIM medical management as indicated  5.Continue with current medication regimen  6.Will review labs in the a.m.  7.Disposition Planning: Discharge planning and efforts remain ongoing    Behavioral Health Medications: all current active meds have been reviewed and continue current psychiatric medications.  Current Facility-Administered Medications   Medication Dose Route Frequency Provider Last Rate    aluminum-magnesium hydroxide-simethicone  30 mL Oral Q4H PRN JHONATAN Fields      atoMOXetine  40 mg Oral Daily Anatoly Prajapati, DO      buPROPion  450 mg Oral Daily Anatoly Prajapati, DO      cloNIDine  0.2 mg Oral Q12H TASHA Virginia Mccullough, JHONATAN      cyanocobalamin  1,000 mcg Oral Daily Dianna Mcrae, JHONATAN      cyanocobalamin  1,000 mcg Intramuscular Q30 Days JHONATAN Berg      furosemide  20 mg Oral Daily Wendy Anderson, JHONATAN      hydrocortisone   Topical 4x Daily PRN JHONATAN Berg      hydrOXYzine HCL  25 mg Oral Q6H PRN Max 4/day Virginia Mccullough, JHONATAN      hydrOXYzine HCL  50 mg Oral Q6H PRN Max 4/day Virginia Mccullough, LAURANP      ibuprofen  200 mg Oral Q6H PRN Virginia Mccullough, JHONATAN      ibuprofen  400 mg Oral Q6H PRN JHONATAN Anne       ibuprofen  600 mg Oral Q8H PRN JHONATAN Anne      levothyroxine  125 mcg Oral Early Morning JHONATAN Fields      loperamide  2 mg Oral Q4H PRN Anatoly Prajapati, DO      LORazepam  1 mg Intramuscular Q6H PRN Max 3/day JHONATAN Anne      LORazepam  1 mg Oral Q6H PRN Max 3/day JHONATAN Anne      losartan  100 mg Oral Daily JHONATAN Fields      melatonin  3 mg Oral HS Frederick Smith MD      naltrexone  50 mg Oral Daily Anatoly Prajapati, DO      OLANZapine  10 mg Oral Q6H PRN Frederick Smith MD      Or    OLANZapine  10 mg Intramuscular Q6H PRN Frederick Smith MD      OLANZapine  5 mg Oral Q6H PRN Frederick Smith MD      Or    OLANZapine  5 mg Intramuscular Q6H PRN Frederick Smith MD      OLANZapine  2.5 mg Oral Q3H PRN Frederick Smith MD      OXcarbazepine  450 mg Oral Q12H [facility] Frederick Smith MD      oxybutynin  5 mg Oral BID JHONATAN Fields      paliperidone  234 mg IM- Deltoid Q4 weeks Frederick Smith MD      pantoprazole  40 mg Oral Early Morning JHONATAN Fields      polyethylene glycol  17 g Oral Daily PRN JHONATAN Anne      saccharomyces boulardii  250 mg Oral BID Anatoly Prajapati, DO      senna-docusate sodium  1 tablet Oral Daily PRN JHONATAN Anne      topiramate  200 mg Oral BID Anatoly Prajapati, DO      traZODone  50 mg Oral HS PRN JHONATAN Anne      warfarin  5 mg Oral Daily (warfarin) JHONATAN Berg         Risks / Benefits of Treatment:  Risks, benefits, and possible side effects of medications explained to patient and patient verbalizes understanding and agreement for treatment.    Counseling / Coordination of Care:  Patient's progress reviewed with nursing staff.  Medications, treatment progress and treatment plan reviewed with patient.  Supportive counseling provided to the patient.    Total floor/unit time spent today 25 minutes. Greater than 50% of total time was spent with the patient and / or  family counseling and / or coordination of care. A description of the counseling / coordination of care: medication education, treatment plan, supportive therapy.    This note was completed in part utilizing Dragon dictation Software. Grammatical, translation, syntax errors, random word insertions, spelling mistakes, and incomplete sentences may be an occasional consequence of this system secondary to software limitations with voice recognition, ambient noise, and hardware issues. If you have any questions or concerns about the content, text, or information contained within the body of this dictation, please contact the provider for clarification

## 2024-01-31 NOTE — TREATMENT TEAM
Pt attended all groups.  Pt spontaneous and hopeful of d.c.     01/31/24 1000   Activity/Group Checklist   Group Community meeting   Attendance Attended   Attendance Duration (min) 31-45   Interactions Interacted appropriately   Affect/Mood Bright   Goals Achieved Identified feelings;Identified relapse prevention strategies;Increased hopefulness;Discussed coping strategies;Able to listen to others;Able to engage in interactions;Able to reflect/comment on own behavior;Able to manage/cope with feelings;Verbalized increased hopefulness;Able to self-disclose;Able to recieve feedback

## 2024-02-01 VITALS
TEMPERATURE: 97.2 F | OXYGEN SATURATION: 96 % | SYSTOLIC BLOOD PRESSURE: 136 MMHG | BODY MASS INDEX: 47.09 KG/M2 | DIASTOLIC BLOOD PRESSURE: 76 MMHG | HEART RATE: 73 BPM | WEIGHT: 293 LBS | HEIGHT: 66 IN | RESPIRATION RATE: 16 BRPM

## 2024-02-01 RX ADMIN — BUPROPION HYDROCHLORIDE 450 MG: 300 TABLET, EXTENDED RELEASE ORAL at 08:03

## 2024-02-01 RX ADMIN — TOPIRAMATE 200 MG: 100 TABLET, FILM COATED ORAL at 08:03

## 2024-02-01 RX ADMIN — IBUPROFEN 600 MG: 600 TABLET ORAL at 04:32

## 2024-02-01 RX ADMIN — CYANOCOBALAMIN TAB 1000 MCG 1000 MCG: 1000 TAB at 08:08

## 2024-02-01 RX ADMIN — NALTREXONE HYDROCHLORIDE 50 MG: 50 TABLET, FILM COATED ORAL at 08:08

## 2024-02-01 RX ADMIN — ATOMOXETINE 40 MG: 40 CAPSULE ORAL at 08:03

## 2024-02-01 RX ADMIN — OXYBUTYNIN CHLORIDE 5 MG: 5 TABLET ORAL at 08:10

## 2024-02-01 RX ADMIN — OXCARBAZEPINE 450 MG: 150 TABLET, FILM COATED ORAL at 08:04

## 2024-02-01 RX ADMIN — LEVOTHYROXINE SODIUM 125 MCG: 125 TABLET ORAL at 05:52

## 2024-02-01 RX ADMIN — Medication 250 MG: at 08:03

## 2024-02-01 RX ADMIN — LOSARTAN POTASSIUM 100 MG: 50 TABLET, FILM COATED ORAL at 08:03

## 2024-02-01 RX ADMIN — CLONIDINE HYDROCHLORIDE 0.2 MG: 0.1 TABLET ORAL at 08:07

## 2024-02-01 RX ADMIN — PANTOPRAZOLE SODIUM 40 MG: 40 TABLET, DELAYED RELEASE ORAL at 05:52

## 2024-02-01 RX ADMIN — FUROSEMIDE 20 MG: 20 TABLET ORAL at 08:08

## 2024-02-01 NOTE — NURSING NOTE
Pt pleasant and optimistic regarding discharge.  Good appetite and steady gait.  VSS.  Denied SI.  Med-compliant.  Pt expressed understanding of d/c meds and f/u instructions.  Pt left unit with all her belongings and escorted by staff to lobby at 1100 to meet ACT Team for transport home.  Monitored for safety and support.

## 2024-02-01 NOTE — PLAN OF CARE
Problem: Ineffective Coping  Goal: Identifies ineffective coping skills  Outcome: Adequate for Discharge  Goal: Identifies healthy coping skills  Outcome: Adequate for Discharge  Goal: Demonstrates healthy coping skills  Outcome: Adequate for Discharge  Goal: Participates in unit activities  Description: Interventions:  - Provide therapeutic environment   - Provide required programming   - Redirect inappropriate behaviors   Outcome: Adequate for Discharge  Goal: Patient/Family participate in treatment and DC plans  Description: Interventions:  - Provide therapeutic environment  Outcome: Adequate for Discharge  Goal: Patient/Family verbalizes awareness of resources  Outcome: Adequate for Discharge  Goal: Understands least restrictive measures  Description: Interventions:  - Utilize least restrictive behavior  Outcome: Adequate for Discharge     Problem: Risk for Self Injury/Neglect  Goal: Treatment Goal: Remain safe during length of stay, learn and adopt new coping skills, and be free of self-injurious ideation, impulses and acts at the time of discharge  Outcome: Adequate for Discharge  Goal: Verbalize thoughts and feelings  Description: Interventions:  - Assess and re-assess patient's lethality and potential for self-injury  - Engage patient in 1:1 interactions, daily, for a minimum of 15 minutes  - Encourage patient to express feelings, fears, frustrations, hopes  - Establish rapport/trust with patient   Outcome: Adequate for Discharge  Goal: Refrain from harming self  Description: Interventions:  - Monitor patient closely, per order  - Develop a trusting relationship  - Supervise medication ingestion, monitor effects and side effects   Outcome: Adequate for Discharge  Goal: Attend and participate in unit activities, including therapeutic, recreational, and educational groups  Description: Interventions:  - Provide therapeutic and educational activities daily, encourage attendance and participation, and document  same in the medical record  - Obtain collateral information, encourage visitation and family involvement in care   Outcome: Adequate for Discharge  Goal: Recognize maladaptive responses and adopt new coping mechanisms  Outcome: Adequate for Discharge  Goal: Complete daily ADLs, including personal hygiene independently, as able  Description: Interventions:  - Observe, teach, and assist patient with ADLS  - Monitor and promote a balance of rest/activity, with adequate nutrition and elimination  Outcome: Adequate for Discharge     Problem: Depression  Goal: Treatment Goal: Demonstrate behavioral control of depressive symptoms, verbalize feelings of improved mood/affect, and adopt new coping skills prior to discharge  Outcome: Adequate for Discharge  Goal: Verbalize thoughts and feelings  Description: Interventions:  - Assess and re-assess patient's level of risk   - Engage patient in 1:1 interactions, daily, for a minimum of 15 minutes   - Encourage patient to express feelings, fears, frustrations, hopes   Outcome: Adequate for Discharge  Goal: Refrain from harming self  Description: Interventions:  - Monitor patient closely, per order   - Supervise medication ingestion, monitor effects and side effects   Outcome: Adequate for Discharge  Goal: Refrain from isolation  Description: Interventions:  - Develop a trusting relationship   - Encourage socialization   Outcome: Adequate for Discharge  Goal: Refrain from self-neglect  Outcome: Adequate for Discharge  Goal: Attend and participate in unit activities, including therapeutic, recreational, and educational groups  Description: Interventions:  - Provide therapeutic and educational activities daily, encourage attendance and participation, and document same in the medical record   Outcome: Adequate for Discharge  Goal: Complete daily ADLs, including personal hygiene independently, as able  Description: Interventions:  - Observe, teach, and assist patient with ADLS  -   Monitor and promote a balance of rest/activity, with adequate nutrition and elimination   Outcome: Adequate for Discharge     Problem: Anxiety  Goal: Anxiety is at manageable level  Description: Interventions:  - Assess and monitor patient's anxiety level.   - Monitor for signs and symptoms (heart palpitations, chest pain, shortness of breath, headaches, nausea, feeling jumpy, restlessness, irritable, apprehensive).   - Collaborate with interdisciplinary team and initiate plan and interventions as ordered.  - [place] patient to unit/surroundings  - Explain treatment plan  - Encourage participation in care  - Encourage verbalization of concerns/fears  - Identify coping mechanisms  - Assist in developing anxiety-reducing skills  - Administer/offer alternative therapies  - Limit or eliminate stimulants  Outcome: Adequate for Discharge     Problem: Risk for Violence/Aggression Toward Others  Goal: Treatment Goal: Refrain from acts of violence/aggression during length of stay, and demonstrate improved impulse control at the time of discharge  Outcome: Adequate for Discharge  Goal: Verbalize thoughts and feelings  Description: Interventions:  - Assess and re-assess patient's level of risk, every waking shift  - Engage patient in 1:1 interactions, daily, for a minimum of 15 minutes   - Allow patient to express feelings and frustrations in a safe and non-threatening manner   - Establish rapport/trust with patient   Outcome: Adequate for Discharge  Goal: Refrain from harming others  Outcome: Adequate for Discharge  Goal: Refrain from destructive acts on the environment or property  Outcome: Adequate for Discharge  Goal: Control angry outbursts  Description: Interventions:  - Monitor patient closely, per order  - Ensure early verbal de-escalation  - Monitor prn medication needs  - Set reasonable/therapeutic limits, outline behavioral expectations, and consequences   - Provide a non-threatening milieu, utilizing the least  restrictive interventions   Outcome: Adequate for Discharge  Goal: Attend and participate in unit activities, including therapeutic, recreational, and educational groups  Description: Interventions:  - Provide therapeutic and educational activities daily, encourage attendance and participation, and document same in the medical record   Outcome: Adequate for Discharge  Goal: Identify appropriate positive anger management techniques  Description: Interventions:  - Offer anger management and coping skills groups   - Staff will provide positive feedback for appropriate anger control  Outcome: Adequate for Discharge     Problem: DISCHARGE PLANNING - CARE MANAGEMENT  Goal: Discharge to post-acute care or home with appropriate resources  Description: INTERVENTIONS:  - Conduct assessment to determine patient/family and health care team treatment goals, and need for post-acute services based on payer coverage, community resources, and patient preferences, and barriers to discharge  - Address psychosocial, clinical, and financial barriers to discharge as identified in assessment in conjunction with the patient/family and health care team  - Arrange appropriate level of post-acute services according to patient’s   needs and preference and payer coverage in collaboration with the physician and health care team  - Communicate with and update the patient/family, physician, and health care team regarding progress on the discharge plan  - Arrange appropriate transportation to post-acute venues  Outcome: Adequate for Discharge

## 2024-02-01 NOTE — NURSING NOTE
"Pt pleasant and visible this shift. Pt states she feels anxious about going home tomorrow and feels sad because this unit has become \"my home.\" Pt reassured and the pt states she is looking forward to seeing her dad and cat tomorrow. Pt denies all psych symptoms. CPAP in place and pt is resting with even, unlabored respirations. Pt is able to and encouraged to inform staff of any needs.  "

## 2024-02-01 NOTE — TREATMENT TEAM
02/01/24 0429   Pain Assessment   Pain Assessment Tool 0-10   Pain Score 8   Pain Location/Orientation Location: Head;Location: Neck   Pain Onset/Description Onset: Gradual   Effect of Pain on Daily Activities awoke from sleep   Patient's Stated Pain Goal No pain   Hospital Pain Intervention(s) Medication (See MAR)     Pt requesting PRN for headache and neck pain.

## 2024-02-01 NOTE — PROGRESS NOTES
02/01/24 0756   Team Meeting   Meeting Type Daily Rounds   Initial Conference Date 02/01/24   Next Conference Date 02/02/24   Team Members Present   Team Members Present Physician;Nurse;;;Occupational Therapist   Physician Team Member Dr Smith, Dr Epps, Dr Quispe, SHEFALI Almaguer   Nursing Team Member Reyes   Care Management Team Member Atiya   Social Work Team Member Dalia   OT Team Member Yeison   Patient/Family Present   Patient Present No   Patient's Family Present No     Invega SNOWDEN yesterday, discharge today.

## 2024-02-01 NOTE — PLAN OF CARE
Problem: Ineffective Coping  Goal: Demonstrates healthy coping skills  Outcome: Progressing  Goal: Participates in unit activities  Description: Interventions:  - Provide therapeutic environment   - Provide required programming   - Redirect inappropriate behaviors   Outcome: Progressing  Goal: Patient/Family verbalizes awareness of resources  Outcome: Progressing  Goal: Understands least restrictive measures  Description: Interventions:  - Utilize least restrictive behavior  Outcome: Progressing     Problem: Risk for Self Injury/Neglect  Goal: Treatment Goal: Remain safe during length of stay, learn and adopt new coping skills, and be free of self-injurious ideation, impulses and acts at the time of discharge  Outcome: Progressing  Goal: Verbalize thoughts and feelings  Description: Interventions:  - Assess and re-assess patient's lethality and potential for self-injury  - Engage patient in 1:1 interactions, daily, for a minimum of 15 minutes  - Encourage patient to express feelings, fears, frustrations, hopes  - Establish rapport/trust with patient   Outcome: Progressing  Goal: Refrain from harming self  Description: Interventions:  - Monitor patient closely, per order  - Develop a trusting relationship  - Supervise medication ingestion, monitor effects and side effects   Outcome: Progressing  Goal: Attend and participate in unit activities, including therapeutic, recreational, and educational groups  Description: Interventions:  - Provide therapeutic and educational activities daily, encourage attendance and participation, and document same in the medical record  - Obtain collateral information, encourage visitation and family involvement in care   Outcome: Progressing  Goal: Recognize maladaptive responses and adopt new coping mechanisms  Outcome: Progressing  Goal: Complete daily ADLs, including personal hygiene independently, as able  Description: Interventions:  - Observe, teach, and assist patient with  ADLS  - Monitor and promote a balance of rest/activity, with adequate nutrition and elimination  Outcome: Progressing     Problem: Depression  Goal: Verbalize thoughts and feelings  Description: Interventions:  - Assess and re-assess patient's level of risk   - Engage patient in 1:1 interactions, daily, for a minimum of 15 minutes   - Encourage patient to express feelings, fears, frustrations, hopes   Outcome: Progressing  Goal: Refrain from isolation  Description: Interventions:  - Develop a trusting relationship   - Encourage socialization   Outcome: Progressing  Goal: Refrain from self-neglect  Outcome: Progressing

## 2024-02-01 NOTE — CASE MANAGEMENT
Psych eval, labs, most recent treatment note, and discharge summary were emailed to Jeanine with [facility] at [email or web address]

## 2024-02-01 NOTE — DISCHARGE SUMMARY
"  Discharge Summary - Behavioral Health   Blanca Mason 53 y.o. female MRN: [id number]  Unit/Bed#: [facility] 651-02 Encounter: [id number]     Admission Date:   Admission Orders (From admission, onward)       Ordered        12/01/23 2057  ED TO DIFFERENT CAMPUS [facility] UNIT or INPATIENT MEDICAL UNIT to [facility] UNIT (using Discharge Readmit Navigator) - Admit Patient to IP Behavioral Health Unit  Once                                Discharge Date: 02/01/24     Attending Psychiatrist: Dr. Smith    Admission Diagnosis:    Principal Problem:    Schizoaffective disorder, bipolar type (HCC)  Active Problems:    Benign essential hypertension    Edema    Hypothyroidism    MAG (obstructive sleep apnea)    Overactive bladder    Sjogren's syndrome (HCC)    Medical clearance for psychiatric admission    Anxiety    Borderline personality disorder (HCC)    History of pulmonary embolism    Ingrown toenail      Discharge Diagnosis:     Principal Problem:    Schizoaffective disorder, bipolar type (HCC)  Active Problems:    Benign essential hypertension    Edema    Hypothyroidism    MAG (obstructive sleep apnea)    Overactive bladder    Sjogren's syndrome (HCC)    Medical clearance for psychiatric admission    Anxiety    Borderline personality disorder (HCC)    History of pulmonary embolism    Ingrown toenail  Resolved Problems:    * No resolved hospital problems. *      Reason for Admission/HPI:     \" Blanca Mason is a 53 y.o. female with a history of Schizoaffective Disorder who was admitted to the inpatient older adult psychiatric unit on a voluntary 201 commitment basis due to  overdose on Trazodone 100mg 21 tablets on Thursday night 11/30.  Medically cleared in [facility] ED .  Per crisis note from 12/01: \"Patient is a 53-year-old female with longstanding mental illness to include schizoaffective disorder who arrived by EMS for intentional overdose.  Patient has had multiple suicide attempts and subsequent inpatient BH " "admissions.  Last  admission was at [facility] from 11/10-11/17/23. Patient is connected with [facility].  Patient now is medically cleared. On assessment patient was fully oriented, blunted, logical, soft spoken, calm and cooperative.  Patient states that [place] \"messed my meds up\".  Patient  reports chronic pain and feels like she has no purpose . Yesterday she the \"got bad news\" that her MRI has been postponed for another 6 months which exacerbated her hopelessness. Today at approximately 0530 she ingested 21 trazodone pills as an attempt to end her life. Patient states \"All I could find in the bottle..I called my friend for Vicodin but couldn't get them.\"  Patient states that she called her [facility] worker following the overdose and they called 911. Regarding current thoughts of hurting herself, she nods her heads yes. Patient endorses feelings of hopelessness and anhedonia. States all she does is lay around on day in her chair and sleep. 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 Patient states she has not cut her herself/self harmed since being prescribed coumadin last September. Patient denies HI/AVH.  Patient states that her appetite and sleep fluctuates.  Reports she sleeps in a recliner due to chronic pain. States she is 2 months sober of all drugs/ETOH though is prescribed medical marijuana. Patient is connected with  ACT.  No longer sees a therapist at [facility] because\" I was double dipping with insurance\".  Lives alone with Cat. Called her father to take care of her cat. Denies access to firearms or legal issues.      Patient signed 201.  Rights and 72-hour notice were explained and understood. Patient is requesting admission at [facility] because \"I need a hospital type bed to sleep\".  Patient gave verbal consent for [facility] to be updated. \"     On evaluation in the inpatient psychiatric unit Blanca states she regrets her suicide attempt.  States " "she feels that she is not taking medications with because they were not in a posterior pack.  States that she was feeling more depressed, and began having suicidal thoughts, felt that she was \"bored\".  States that she has not been working, and she actually reached out to her friends to try and get Vicodin with a plan to overdose to kill herself.  States that she is feeling frustrated because she is not able to get more MRI, as she was told that she has to get special sedation and this will take 3 months to get this done.  States that she was feeling overwhelmed and \"like nothing goes well\".  States she does have people depending on her, and she wants to go back to work. Denies thoughts to hurt herself now, regrets her suicide attempt. States she feels safe here on the unit.   States she did followup with her haldol injection on 11/22. States she just had some medication adjustments, including increasing her Wellbutrin and Latuda. States she wants to continue with this plan, but has not started the increased dose yet.\"     Past Medical History:   Diagnosis Date    Anxiety     Arthritis     oa cassandra knees    Asthma     good control- no medications    Yan's esophagus     Bipolar affective disorder (HCC)     Cannabis abuse with unspecified 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HCC)     Chronic pain disorder     lumbar    COPD (chronic obstructive pulmonary disease) (HCC)     CPAP (continuous positive airway pressure) dependence     DDD (degenerative disc disease), lumbar 04/09/2015    Degenerative disc disease at L5-S1 level     Deliberate self-cutting     9/15/22per pt has not done recently-- does see a therapist and psychiatrist regularly    Depression 09/16/2008    Disease of thyroid gland     hypo    MARTINEZ (dyspnea on exertion)     per pt \"has had this with exertion and not new\"    Drug overdose 10/28/2008    suicide attempt and again drug overdose 7/2022-- [facility] floor and than transferred to [facility] Psych    Dysphagia     " Dyspnea     Edema     BLE    Family history of blood clots     GERD (gastroesophageal reflux disease)     Headache(784.0)     Headache, tension-type     Hemorrhage of rectum and anus 10/02/2017    Formatting of this note might be different from the original.  Added automatically from request for surgery [id number]    High blood pressure     History of COVID-19 12/30/2020    Symptoms started on 12/30/2020 and then got worse.  Today she is feeling a little bit better.  She is a candidate for monoclonal antibody infusion and set for 1/6/21 @ 1pm at [facility]. 01/07/21 - telemedicine -     Hyperlipidemia     Hypertension     Knee pain, bilateral     Especially right    Medial epicondylitis of elbow, left 04/03/2018    Formatting of this note might be different from the original.  Added automatically from request for surgery [id number]    Medial epicondylitis of right elbow 07/17/2023    Medical marijuana use     Memory loss     Migraines     Obesity     Overactive bladder     Primary osteoarthritis of both knees 08/08/2018    Pulmonary emboli (HCC)     Restrictive lung disease 02/01/2017    Last Assessment & Plan:   Formatting of this note might be different from the original.  I reviewed this problem throughout the rest of the note. Please refer to the other assessments for details.    Sjogren's disease (HCC)     Sleep apnea     Stress incontinence     Suicidal ideations     Teeth missing     Tremor     per pt Tremors of Right Leg and both Arms    Visual impairment     Wears glasses      Past Surgical History:   Procedure Laterality Date    BREAST CYST EXCISION Right 1989    CARPAL TUNNEL RELEASE Left     CHOLECYSTECTOMY  05/2003    Laparoscopic    COLONOSCOPY      01/12/2009    DILATION AND CURETTAGE OF UTERUS      ELBOW SURGERY Left     x2. No hardware    ESOPHAGOGASTRODUODENOSCOPY      FOOT SURGERY Left     Plantar fasciotomy    HERNIA REPAIR      HYSTERECTOMY      laporoscopic, partial    KNEE ARTHROSCOPY Bilateral   "   OOPHORECTOMY Left 10/2015    NM GASTROCNEMIUS RECESSION Left 02/24/2021    Procedure: RECESSION GASTROC OPEN;  Surgeon: Nomi Yang DPM;  Location:  MAIN OR;  Service: Podiatry    NM RPR UMBILICAL HRNA 5 YRS/> REDUCIBLE N/A 04/24/2019    Procedure: REPAIR HERNIA UMBILICAL LAPAROSCOPIC W/ ROBOTICS;  Surgeon: Anahi Colon MD;  Location: [facility] OR;  Service: General    NM SPHINCTEROTOMY ANAL DIVISION SPHINCTER SPX N/A 08/31/2018    Procedure: EUA, LEFT PARTIAL INTERNAL SPHINCTEROTOMY;  Surgeon: Tien Reyes MD;  Location:  MAIN OR;  Service: Colorectal    NM TNOT ELBOW LATERAL/MEDIAL DEBRIDE OPEN TDN RPR Left 09/20/2022    Procedure: RELEASE EPICONDYLAR ELBOW MEDIAL;  Surgeon: Kyree Winkler MD;  Location: [facility] MAIN OR;  Service: Orthopedics    REDUCTION MAMMAPLASTY Bilateral 1999    REPAIR LACERATION Left     left hand-5/18/2009    REPLACEMENT TOTAL KNEE Right     ROTATOR CUFF REPAIR Left     TONSILECTOMY AND ADNOIDECTOMY      US GUIDED MSK PROCEDURE  04/22/2021    VASCULAR SURGERY      VEIN LIGATION Bilateral     WISDOM TOOTH EXTRACTION         Medications:    All current active medications have been reviewed.    Allergies:     Allergies   Allergen Reactions    Percocet [Oxycodone-Acetaminophen] Headache     Severe headaches   (denies issues with Tylenol)    Povidone Iodine Rash     Unsure if betadine or gauze post surgical. Got rash on leg.   Has  Had itchy rashes after every surgery prep and IV insertion    Chlorhexidine Rash     Per pt \"able to use the liquid soap--thinkd reaction from the sponges or wipes\"       Please refer to the initial H&P for full details.      Vital signs in last 24 hours:    Temp:  [97.1 °F (36.2 °C)-97.6 °F (36.4 °C)] 97.2 °F (36.2 °C)  HR:  [73-89] 73  Resp:  [15-17] 16  BP: (132-147)/(75-80) 136/76      Intake/Output Summary (Last 24 hours) at 2/1/2024 1331  Last data filed at 2/1/2024 0821  Gross per 24 hour   Intake 480 ml   Output --   Net 480 ml "         Hospital Course:   On admission, Blanca was admitted to the inpatient psychiatric unit and started on Behavioral Health checks every 7 minutes. During the hospitalization she was encouraged to attend individual therapy, group therapy, milieu therapy and occupational therapy.  Upon admission she was seen by medical service for medical clearance for inpatient treatment and medical follow up.    Admitted on December 2, 2023 following suicidal attempt by overdosing on medications.  Home medications as well as PDMP reviewed.  Home medications of Latuda 80 mg daily, Wellbutrin  mg daily, Topamax 200 mg twice daily for mood stabilization, clonidine 0.1 mg twice daily for impulsivity, Trileptal 300 mg twice daily for mood stabilization naltrexone 50 mg daily for impulsivity and Strattera 40 mg daily for ADHD.  Patient noted to be increasingly labile, with poor impulse control and low frustration tolerance.  Followed by ACT team in the community, patient noted to have elevated suicidal risk due to multiple admissions secondary to suicidal attempts and passive death wish.  Haldol DEC SNOWDEN also noted to be given in the community, Latuda and SNOWDEN transitioned to Invega due to lack of efficacy.  Invega titrated to 12 mg daily then transition to SNOWDEN Invega Sustenna, last dose given on 1/31/2024.  Extensive education as well as direction given to assist with coping mechanisms as well as anger management.  ACT team in agreement for patient to be discharged today with follow-up DBT therapy and close monitoring to ensure positive outcome and safety.  No current SI/HI verbalized.  No AVH.  Patient extensively educated on resources that may be utilized if feeling unsafe.  In agreement to reach out to father, ACT team or peer from social support group.  Patient also educated on maintaining sobriety which Blanca is in agreement with.  Patient to be discharged into the care of [facility] team.      Blanca's medications were  titrated as appropriate until discharge, including:    Strattera 40 mg daily for ADHD  Wellbutrin 450 mg daily for mood  Clonidine 0.2 mg twice daily for impulsivity  Melatonin 3 mg nightly for insomnia  Naltrexone 50 mg daily for impulsivity  Trileptal 450 mg twice daily for mood stabilization  SNOWDEN Invega Sustenna 234 mg every 4 weeks.  Next dose due 2/28/2024      Prior to beginning of treatment medications risks and benefits and possible side effects including risk of hyponatremia related to treatment with Trileptal, risk of parkinsonian symptoms, Tardive Dyskinesia and metabolic syndrome related to treatment with antipsychotic medications, risk of cardiovascular events in elderly related to treatment with antipsychotic medications, and risk of suicidality and serotonin syndrome related to treatment with antidepressants were reviewed with Blanca. Blanca verbalized understanding and agreement for treatment.  Blanca tolerated these medications with no acute side effects. The patient's mood brightened over the course of treatment, and she was seen in [facility] interacting appropriately with peers. Blanca did not demonstrate dangerous behavior to self or others during her inpatient stay.     On the day of discharge, she denied suicidal ideation, intent or plan at the time of discharge and denied homicidal ideation, intent or plan at the time of discharge. she was participating appropriately in milieu at the time of discharge. Behavior was appropriate on the unit at the time of discharge. Sleep and appetite were improved.      Since she was doing well at the end of the hospitalization, treatment team felt that she could be safely discharged to outpatient care. she's [facility] Team felt that at the end of the hospital stay she was at baseline and was ready for discharge. The outpatient follow up with [facility] Treatment Team was arranged by the unit  upon discharge.    I reviewed with Blanca the  importance of compliance with medications and outpatient treatment after discharge., I discussed the medication regimen and possible side effects of the medications with Blanca prior to discharge. At the time of discharge she was tolerating psychiatric medications., I discussed outpatient follow up with Blanca., I reviewed with Blanca crisis plan and safety plan upon discharge., Blanca was competent to understand risks and benefits of withholding information and risks and benefits of her actions., and Blanca agreed to abstain from drug and alcohol use after discharge. The patient understands the importance of taking their medications and attending their outpatient appointments. The patient knows that if there are concerns for safety to utilize their coping skills and can call the suicide hotline as well as 911 if there are concerns for safety.      Behavioral Health Medications: all current active meds have been reviewed and continue current psychiatric medications.  Discharge on Two Antipsychotic Medications : No    Labs/Imaging:   I have personally reviewed all pertinent laboratory/tests results.  Most Recent Labs:   Lab Results   Component Value Date    WBC 7.52 01/29/2024    RBC 4.69 01/29/2024    HGB 13.8 01/29/2024    HCT 43.4 01/29/2024     01/29/2024    RDW 14.1 01/29/2024    TOTANEUTABS 4.73 11/19/2023    NEUTROABS 4.90 01/29/2024    SODIUM 138 01/29/2024    K 4.0 01/29/2024     01/29/2024    CO2 26 01/29/2024    BUN 13 01/29/2024    CREATININE 0.62 01/29/2024    GLUC 88 01/29/2024    GLUF 88 01/29/2024    CALCIUM 8.6 01/29/2024    AST 13 12/02/2023    ALT 13 12/02/2023    ALKPHOS 55 12/02/2023    TP 6.0 (L) 12/02/2023    ALB 3.5 12/02/2023    TBILI 0.25 12/02/2023    CHOLESTEROL 207 (H) 01/29/2024    HDL 48 (L) 01/29/2024    TRIG 162 (H) 01/29/2024    LDLCALC 127 (H) 01/29/2024    NONHDLC 159 01/29/2024    VALPROICTOT 23 (L) 11/10/2023    AMMONIA 63 11/06/2023    WTV8IWMDWQXD 4.324 12/02/2023    FREET4  "0.81 11/06/2023    T3FREE 2.27 (L) 10/16/2019    PREGUR negative 07/13/2022    PREGSERUM Negative 11/06/2023    RPR Non-Reactive 07/17/2022    HGBA1C 5.7 (H) 01/29/2024     01/29/2024       Mental Status at time of Discharge:   Appearance:  age appropriate, dressed appropriately, looks stated age, sitting comfortably in chair, adequate hygiene and grooming, cooperative with interview, good eye contact    Behavior:  cooperative   Speech:  normal rate, normal volume, normal pitch, fluent, clear, and coherent   Mood:  \"Great\"   Affect:  mood-congruent   Language Within normal limits   Thought Process:  organized, logical, goal directed, normal rate of thoughts   Associations: intact associations      Thought Content:  No verbalized delusions   Perceptual Disturbances: Denies auditory or visual hallucinations   Risk Potential: Denies suicidal or homicidal ideation, plan, or intent   Sensorium:  person, place, time, and current situation   Cognition:  Grossly intact   Consciousness:  alert and awake   Attention: attention span and concentration were age appropriate   Insight:  fair   Judgment: fair   Intellect appears to be of average intelligence   Gait/Station: normal gait/station   Motor Activity: no abnormal movements     Suicide/Homicide Risk Assessment:  Risk of Harm to Self:   The following ratings are based on assessment at the time of discharge  Demographic risk factors include: , age: over 50 or older  Historical Risk Factors include: chronic psychiatric problems, chronic mood disorder  Current Specific Risk Factors include: recent inpatient psychiatric admission - being discharged today, chronic depressive symptoms, chronic anxiety symptoms, poor impulse control  Protective Factors: no current suicidal ideation, improved impulse control  Weapons/Firearms: none. The following steps have been taken to ensure weapons are properly secured: not applicable  Based on today's assessment, Blanca presents " the following risk of harm to self: low    Risk of Harm to Others:  The following ratings are based on assessment at the time of discharge  Demographic Risk Factors include: none.  Historical Risk Factors include: none.  Current Specific Risk Factors include: none  Protective Factors: no current homicidal ideation  Weapons/Firearms: none. The following steps have been taken to ensure weapons are properly secured: not applicable  Based on today's assessment, Blanca presents the following risk of harm to others: minimal    The following interventions are recommended: outpatient follow up with [facility] Community Team (ACT)    Discharge Medications:  See list above, as well as the after visit summary for all reconciled discharge medications provided to patient and family.      Discharge instructions/Information to patient and family:   See after visit summary for information provided to patient and family.      Provisions for Follow-Up Care:  See after visit summary for information related to follow-up care and any pertinent home health orders.        JHONATAN Rodríguez 02/01/24      This note was completed in part utilizing Dragon dictation Software. Grammatical, translation, syntax errors, random word insertions, spelling mistakes, and incomplete sentences may be an occasional consequence of this system secondary to software limitations with voice recognition, ambient noise, and hardware issues. If you have any questions or concerns about the content, text, or information contained within the body of this dictation, please contact the provider for clarification.

## 2024-02-02 ENCOUNTER — PATIENT OUTREACH (OUTPATIENT)
Dept: CASE MANAGEMENT | Facility: OTHER | Age: 54
End: 2024-02-02

## 2024-02-02 NOTE — PROGRESS NOTES
Outpatient Care Management Note:  In basket discharge alert received.  Chart review completed.      Outreach call attempted to Blanca.  Message left for patient to please return call.  Contact information left on message.     Transitions:   Type: Unplanned  Provider notification within 2 days of discharge  PCP: Shameka ISRAEL.   Notified via: ADT alert  Specialty Provider: [facility]  Notified via: picked patient up at discharge per notes.   Other Care Team Member: RAIN HERNANDEZ  Notified via: ADT alert  Collaboration with discharge team: reviewed HP IP/OP LPOC and reviewed Case Management notes  Communication of changes to care plans to patient/caregiver: Will review when able to speak with Blanca.

## 2024-02-05 ENCOUNTER — PATIENT OUTREACH (OUTPATIENT)
Dept: CASE MANAGEMENT | Facility: OTHER | Age: 54
End: 2024-02-05

## 2024-02-05 RX ORDER — LURASIDONE HYDROCHLORIDE 40 MG/1
TABLET, FILM COATED ORAL
COMMUNITY
Start: 2023-12-22

## 2024-02-05 NOTE — PROGRESS NOTES
Outpatient Care Management Note:  Outreach call attempted to Blanca after recent hospitalization.  Message left for patient to please return call.  Contact information left on message.  Letter sent to determine ongoing interest in BYP per protocol.  If no response in two weeks, care management episode will be closed.

## 2024-02-05 NOTE — LETTER
Date: 02/05/24    Dear Blanca Mason,   We tried to reach you by phone several times and were unfortunately unable to reach you.  It is important that you contact someone from your [facility] team as soon as possible if you are interested in continuing outpatient care management services.     You can reach your team by phone Monday through Friday:     Grisel Guerrero RN: [phone]  Etta Herron: [phone]      Sincerely,   Grisel Guerrero; RN, BSN

## 2024-02-05 NOTE — CASE MANAGEMENT
Sent a fax to [facility] attention Khloe Gu ([phone]) with a letter for patients work stating her admission and discharge dates. Also sent the patient a copy to her email at [email or web address].

## 2024-02-06 ENCOUNTER — TELEPHONE (OUTPATIENT)
Dept: HEMATOLOGY ONCOLOGY | Facility: CLINIC | Age: 54
End: 2024-02-06

## 2024-02-06 ENCOUNTER — TELEPHONE (OUTPATIENT)
Dept: NEUROLOGY | Facility: CLINIC | Age: 54
End: 2024-02-06

## 2024-02-06 ENCOUNTER — OFFICE VISIT (OUTPATIENT)
Dept: FAMILY MEDICINE CLINIC | Facility: CLINIC | Age: 54
End: 2024-02-06
Payer: MEDICARE

## 2024-02-06 VITALS
SYSTOLIC BLOOD PRESSURE: 130 MMHG | DIASTOLIC BLOOD PRESSURE: 90 MMHG | HEIGHT: 66 IN | BODY MASS INDEX: 47.09 KG/M2 | TEMPERATURE: 97.3 F | HEART RATE: 73 BPM | WEIGHT: 293 LBS | OXYGEN SATURATION: 99 %

## 2024-02-06 DIAGNOSIS — R41.89 COGNITIVE IMPAIRMENT: ICD-10-CM

## 2024-02-06 DIAGNOSIS — R25.1 TREMORS OF NERVOUS SYSTEM: ICD-10-CM

## 2024-02-06 DIAGNOSIS — Z01.818 PREOP EXAMINATION: Primary | ICD-10-CM

## 2024-02-06 PROCEDURE — 99214 OFFICE O/P EST MOD 30 MIN: CPT

## 2024-02-06 NOTE — TELEPHONE ENCOUNTER
Spoke with patient to let her know she does need a repeat INR. Pt state she will have her labs done tomorrow morning and she is currently taking Coumadin 5mg daily. I let pt know we will f/u once INR results. She verbalized understanding and in agreement with plan

## 2024-02-06 NOTE — TELEPHONE ENCOUNTER
Patient Call    Who are you speaking with? Patient    If it is not the patient, are they listed on an active communication consent form? N/A   What is the reason for this call? Pt just got discharged from the hospital. She asked if her INR should be checked   Does this require a call back? Yes   If a call back is required, please list best call back number [phone]    If a call back is required, advise that a message will be forwarded to their care team and someone will return their call as soon as possible.   Did you relay this information to the patient? Yes

## 2024-02-06 NOTE — PATIENT INSTRUCTIONS
Medicare Preventive Visit Patient Instructions  Thank you for completing your Welcome to Medicare Visit or Medicare Annual Wellness Visit today. Your next wellness visit will be due in one year (2/6/2025).  The screening/preventive services that you may require over the next 5-10 years are detailed below. Some tests may not apply to you based off risk factors and/or age. Screening tests ordered at today's visit but not completed yet may show as past due. Also, please note that scanned in results may not display below.  Preventive Screenings:  Service Recommendations Previous Testing/Comments   Colorectal Cancer Screening  * Colonoscopy    * Fecal Occult Blood Test (FOBT)/Fecal Immunochemical Test (FIT)  * Fecal DNA/Cologuard Test  * Flexible Sigmoidoscopy Age: 45-75 years old   Colonoscopy: every 10 years (may be performed more frequently if at higher risk)  OR  FOBT/FIT: every 1 year  OR  Cologuard: every 3 years  OR  Sigmoidoscopy: every 5 years  Screening may be recommended earlier than age 45 if at higher risk for colorectal cancer. Also, an individualized decision between you and your healthcare provider will decide whether screening between the ages of 76-85 would be appropriate. Colonoscopy: 09/03/2020  FOBT/FIT: Not on file  Cologuard: Not on file  Sigmoidoscopy: 09/03/2020    Screening Current     Breast Cancer Screening Age: 40+ years old  Frequency: every 1-2 years  Not required if history of left and right mastectomy Mammogram: 07/06/2023    Screening Current   Cervical Cancer Screening Between the ages of 21-29, pap smear recommended once every 3 years.   Between the ages of 30-65, can perform pap smear with HPV co-testing every 5 years.   Recommendations may differ for women with a history of total hysterectomy, cervical cancer, or abnormal pap smears in past. Pap Smear: 12/28/2022    Screening Current   Hepatitis C Screening Once for adults born between 1945 and 1965  More frequently in patients at  high risk for Hepatitis C Hep C Antibody: 07/10/2019    Screening Current   Diabetes Screening 1-2 times per year if you're at risk for diabetes or have pre-diabetes Fasting glucose: 88 mg/dL (1/29/2024)  A1C: 5.7 % (1/29/2024)  Screening Current   Cholesterol Screening Once every 5 years if you don't have a lipid disorder. May order more often based on risk factors. Lipid panel: 01/29/2024    Screening Not Indicated  History Lipid Disorder     Other Preventive Screenings Covered by Medicare:  Abdominal Aortic Aneurysm (AAA) Screening: covered once if your at risk. You're considered to be at risk if you have a family history of AAA.  Lung Cancer Screening: covers low dose CT scan once per year if you meet all of the following conditions: (1) Age 55-77; (2) No signs or symptoms of lung cancer; (3) Current smoker or have quit smoking within the last 15 years; (4) You have a tobacco smoking history of at least 20 pack years (packs per day multiplied by number of years you smoked); (5) You get a written order from a healthcare provider.  Glaucoma Screening: covered annually if you're considered high risk: (1) You have diabetes OR (2) Family history of glaucoma OR (3)  aged 50 and older OR (4)  American aged 65 and older  Osteoporosis Screening: covered every 2 years if you meet one of the following conditions: (1) You're estrogen deficient and at risk for osteoporosis based off medical history and other findings; (2) Have a vertebral abnormality; (3) On glucocorticoid therapy for more than 3 months; (4) Have primary hyperparathyroidism; (5) On osteoporosis medications and need to assess response to drug therapy.   Last bone density test (DXA Scan): Not on file.  HIV Screening: covered annually if you're between the age of 15-65. Also covered annually if you are younger than 15 and older than 65 with risk factors for HIV infection. For pregnant patients, it is covered up to 3 times per  pregnancy.    Immunizations:  Immunization Recommendations   Influenza Vaccine Annual influenza vaccination during flu season is recommended for all persons aged >= 6 months who do not have contraindications   Pneumococcal Vaccine   * Pneumococcal conjugate vaccine = PCV13 (Prevnar 13), PCV15 (Vaxneuvance), PCV20 (Prevnar 20)  * Pneumococcal polysaccharide vaccine = PPSV23 (Pneumovax) Adults 19-63 yo with certain risk factors or if 65+ yo  If never received any pneumonia vaccine: recommend Prevnar 20 (PCV20)  Give PCV20 if previously received 1 dose of PCV13 or PPSV23   Hepatitis B Vaccine 3 dose series if at intermediate or high risk (ex: diabetes, end stage renal disease, liver disease)   Respiratory syncytial virus (RSV) Vaccine - COVERED BY MEDICARE PART D  * RSVPreF3 (Arexvy) CDC recommends that adults 60 years of age and older may receive a single dose of RSV vaccine using shared clinical decision-making (SCDM)   Tetanus (Td) Vaccine - COST NOT COVERED BY MEDICARE PART B Following completion of primary series, a booster dose should be given every 10 years to maintain immunity against tetanus. Td may also be given as tetanus wound prophylaxis.   Tdap Vaccine - COST NOT COVERED BY MEDICARE PART B Recommended at least once for all adults. For pregnant patients, recommended with each pregnancy.   Shingles Vaccine (Shingrix) - COST NOT COVERED BY MEDICARE PART B  2 shot series recommended in those 19 years and older who have or will have weakened immune systems or those 50 years and older     Health Maintenance Due:      Topic Date Due   • Cervical Cancer Screening  12/28/2023   • Colorectal Cancer Screening  09/03/2030   • HIV Screening  Completed   • Hepatitis C Screening  Completed   • Lung Cancer Screening  Discontinued     Immunizations Due:      Topic Date Due   • Pneumococcal Vaccine: Pediatrics (0 to 5 Years) and At-Risk Patients (6 to 64 Years) (1 - PCV) Never done   • Hepatitis A Vaccine (1 of 2 - Risk  2-dose series) Never done   • COVID-19 Vaccine (6 - 2023-24 season) 09/01/2023     Advance Directives   What are advance directives?  Advance directives are legal documents that state your wishes and plans for medical care. These plans are made ahead of time in case you lose your ability to make decisions for yourself. Advance directives can apply to any medical decision, such as the treatments you want, and if you want to donate organs.   What are the types of advance directives?  There are many types of advance directives, and each state has rules about how to use them. You may choose a combination of any of the following:  Living will:  This is a written record of the treatment you want. You can also choose which treatments you do not want, which to limit, and which to stop at a certain time. This includes surgery, medicine, IV fluid, and tube feedings.   Durable power of  for healthcare (DPAHC):  This is a written record that states who you want to make healthcare choices for you when you are unable to make them for yourself. This person, called a proxy, is usually a family member or a friend. You may choose more than 1 proxy.  Do not resuscitate (DNR) order:  A DNR order is used in case your heart stops beating or you stop breathing. It is a request not to have certain forms of treatment, such as CPR. A DNR order may be included in other types of advance directives.  Medical directive:  This covers the care that you want if you are in a coma, near death, or unable to make decisions for yourself. You can list the treatments you want for each condition. Treatment may include pain medicine, surgery, blood transfusions, dialysis, IV or tube feedings, and a ventilator (breathing machine).  Values history:  This document has questions about your views, beliefs, and how you feel and think about life. This information can help others choose the care that you would choose.  Why are advance directives important?   An advance directive helps you control your care. Although spoken wishes may be used, it is better to have your wishes written down. Spoken wishes can be misunderstood, or not followed. Treatments may be given even if you do not want them. An advance directive may make it easier for your family to make difficult choices about your care.   Weight Management   Why it is important to manage your weight:  Being overweight increases your risk of health conditions such as heart disease, high blood pressure, type 2 diabetes, and certain types of cancer. It can also increase your risk for osteoarthritis, sleep apnea, and other respiratory problems. Aim for a slow, steady weight loss. Even a small amount of weight loss can lower your risk of health problems.  How to lose weight safely:  A safe and healthy way to lose weight is to eat fewer calories and get regular exercise. You can lose up about 1 pound a week by decreasing the number of calories you eat by 500 calories each day.   Healthy meal plan for weight management:  A healthy meal plan includes a variety of foods, contains fewer calories, and helps you stay healthy. A healthy meal plan includes the following:  Eat whole-grain foods more often.  A healthy meal plan should contain fiber. Fiber is the part of grains, fruits, and vegetables that is not broken down by your body. Whole-grain foods are healthy and provide extra fiber in your diet. Some examples of whole-grain foods are whole-wheat breads and pastas, oatmeal, brown rice, and bulgur.  Eat a variety of vegetables every day.  Include dark, leafy greens such as spinach, kale, brandie greens, and mustard greens. Eat yellow and orange vegetables such as carrots, sweet potatoes, and winter squash.   Eat a variety of fruits every day.  Choose fresh or canned fruit (canned in its own juice or light syrup) instead of juice. Fruit juice has very little or no fiber.  Eat low-fat dairy foods.  Drink fat-free (skim) milk  or 1% milk. Eat fat-free yogurt and low-fat cottage cheese. Try low-fat cheeses such as mozzarella and other reduced-fat cheeses.  Choose meat and other protein foods that are low in fat.  Choose beans or other legumes such as split peas or lentils. Choose fish, skinless poultry (chicken or turkey), or lean cuts of red meat (beef or pork). Before you cook meat or poultry, cut off any visible fat.   Use less fat and oil.  Try baking foods instead of frying them. Add less fat, such as margarine, sour cream, regular salad dressing and mayonnaise to foods. Eat fewer high-fat foods. Some examples of high-fat foods include french fries, doughnuts, ice cream, and cakes.  Eat fewer sweets.  Limit foods and drinks that are high in sugar. This includes candy, cookies, regular soda, and sweetened drinks.  Exercise:  Exercise at least 30 minutes per day on most days of the week. Some examples of exercise include walking, biking, dancing, and swimming. You can also fit in more physical activity by taking the stairs instead of the elevator or parking farther away from stores. Ask your healthcare provider about the best exercise plan for you.      © Copyright [employer] 2018 Information is for End User's use only and may not be sold, redistributed or otherwise used for commercial purposes. All illustrations and images included in CareNotes® are the copyrighted property of [employer] or [employer]

## 2024-02-06 NOTE — PROGRESS NOTES
Assessment and Plan:     Problem List Items Addressed This Visit          Cardiovascular and Mediastinum    Acute deep vein thrombosis of lower leg, left (HCC)       Other    Schizoaffective disorder, bipolar type (HCC) (Chronic)    Relevant Medications    lurasidone (LATUDA) 40 mg tablet    Anxiety    BMI 45.0-49.9, adult (HCC)     Other Visit Diagnoses       Hospitalization within last 30 days    -  Primary    Medicare annual wellness visit, subsequent                Depression Screening and Follow-up Plan: Patient was screened for depression during today's encounter. They screened negative with a PHQ-9 score of 0.      Preventive health issues were discussed with patient, and age appropriate screening tests were ordered as noted in patient's After Visit Summary.  Personalized health advice and appropriate referrals for health education or preventive services given if needed, as noted in patient's After Visit Summary.     History of Present Illness:     Patient presents for a Medicare Wellness Visit    AWV   Follow up        Patient Care Team:  JHONATAN Armando as PCP - General (Nurse Practitioner)  Arnold Sims MD as PCP - PCP-[facility] ([facility])  Marilyn Diaz MD (Endocrinology)  Kyree Winkler MD (Orthopedic Surgery)  Zaira Talamantes DO (Obstetrics and Gynecology)  Grisel Guerrero RN as RN Care Manager (Care Coordination)  Erin Pineda LCSW as  Care Manager (Care Coordination)  Etta Herron as  Care Manager (Care Coordination)     Review of Systems:     Review of Systems   Constitutional:  Negative for activity change, appetite change, chills, fatigue and fever.   HENT:  Negative for congestion, ear pain, rhinorrhea, sore throat and trouble swallowing.    Eyes:  Negative for pain and visual disturbance.   Respiratory:  Negative for cough, chest tightness and shortness of breath.    Cardiovascular:  Negative for chest pain, palpitations and leg swelling.    Gastrointestinal:  Negative for abdominal pain, constipation, diarrhea, nausea and vomiting.   Genitourinary:  Negative for difficulty urinating, dysuria, hematuria and urgency.   Musculoskeletal:  Negative for arthralgias and back pain.   Skin:  Negative for color change and rash.   Neurological:  Negative for dizziness, seizures, syncope and headaches.   Psychiatric/Behavioral:  Negative for behavioral problems, decreased concentration, dysphoric mood, hallucinations, self-injury, sleep disturbance and suicidal ideas. The patient is not nervous/anxious.    All other systems reviewed and are negative.       Problem List:     Patient Active Problem List   Diagnosis    Yan's esophagus determined by biopsy    Benign essential hypertension    Schizoaffective disorder, bipolar type ([facility])    Chronic low back pain    Edema    Family history of renal cancer    Hypothyroidism    MAG (obstructive sleep apnea)    Overactive bladder    Urinary incontinence    Major depressive disorder    Sjogren's syndrome ([facility])    COPD (chronic obstructive pulmonary disease) ([facility])    Mixed hyperlipidemia    BMI 45.0-49.9, adult ([facility])    Memory difficulties    History of COVID-19    Medical clearance for psychiatric admission    Chronic tension-type headache, intractable    Potential for cognitive impairment    Mood disorder ([facility])    Substance abuse ([facility])    Cognitive impairment    Contusion of elbow    Diarrhea    Ecchymosis    Anxiety    Borderline personality disorder ([facility])    Platelets decreased ([facility])    Knee pain, right    Suicidal deliberate poisoning ([facility])    Intentional self-harm by unspecified sharp object, sequela ([facility])    Chronic eczematous otitis externa of both ears    Chronic migraine without aura without status migrainosus, not intractable    Bilateral leg edema    Cervicalgia    History of pulmonary embolism    Elevated troponin    Tremors of nervous system    Intentional overdose ([facility])    Polysubstance abuse ([facility])     "Restless leg syndrome    Acute saddle pulmonary embolism (HCC)    Acute deep vein thrombosis of lower leg, left (HCC)    Lymphedema    Toxic encephalopathy    Grief    History of DVT (deep vein thrombosis)    Ingrown toenail      Past Medical and Surgical History:     Past Medical History:   Diagnosis Date    Anxiety     Arthritis     oa cassandra knees    Asthma     good control- no medications    Yan's esophagus     Bipolar affective disorder (HCC)     Cannabis abuse with unspecified 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HCC)     Chronic pain disorder     lumbar    COPD (chronic obstructive pulmonary disease) (HCC)     CPAP (continuous positive airway pressure) dependence     DDD (degenerative disc disease), lumbar 04/09/2015    Degenerative disc disease at L5-S1 level     Deliberate self-cutting     9/15/22per pt has not done recently-- does see a therapist and psychiatrist regularly    Depression 09/16/2008    Disease of thyroid gland     hypo    MARTINEZ (dyspnea on exertion)     per pt \"has had this with exertion and not new\"    Drug overdose 10/28/2008    suicide attempt and again drug overdose 7/2022--[facility] floor and than transferred to [facility]    Dysphagia     Dyspnea     Edema     BLE    Family history of blood clots     GERD (gastroesophageal reflux disease)     Headache(784.0)     Headache, tension-type     Hemorrhage of rectum and anus 10/02/2017    Formatting of this note might be different from the original.  Added automatically from request for surgery [id number]    High blood pressure     History of COVID-19 12/30/2020    Symptoms started on 12/30/2020 and then got worse.  Today she is feeling a little bit better.  She is a candidate for monoclonal antibody infusion and set for 1/6/21 @ 1pm at [facility]. 01/07/21 - telemedicine -     Hyperlipidemia     Hypertension     Knee pain, bilateral     Especially right    Medial epicondylitis of elbow, left 04/03/2018    Formatting of this note might be different " from the original.  Added automatically from request for surgery [id number]    Medial epicondylitis of right elbow 07/17/2023    Medical marijuana use     Memory loss     Migraines     Obesity     Overactive bladder     Primary osteoarthritis of both knees 08/08/2018    Pulmonary emboli (HCC)     Restrictive lung disease 02/01/2017    Last Assessment & Plan:   Formatting of this note might be different from the original.  I reviewed this problem throughout the rest of the note. Please refer to the other assessments for details.    Sjogren's disease (HCC)     Sleep apnea     Stress incontinence     Suicidal ideations     Teeth missing     Tremor     per pt Tremors of Right Leg and both Arms    Visual impairment     Wears glasses      Past Surgical History:   Procedure Laterality Date    BREAST CYST EXCISION Right 1989    CARPAL TUNNEL RELEASE Left     CHOLECYSTECTOMY  05/2003    Laparoscopic    COLONOSCOPY      01/12/2009    DILATION AND CURETTAGE OF UTERUS      ELBOW SURGERY Left     x2. No hardware    ESOPHAGOGASTRODUODENOSCOPY      FOOT SURGERY Left     Plantar fasciotomy    HERNIA REPAIR      HYSTERECTOMY      laporoscopic, partial    KNEE ARTHROSCOPY Bilateral     OOPHORECTOMY Left 10/2015    SD GASTROCNEMIUS RECESSION Left 02/24/2021    Procedure: RECESSION GASTROC OPEN;  Surgeon: Nomi Yang DPM;  Location:  MAIN OR;  Service: Podiatry    SD RPR UMBILICAL HRNA 5 YRS/> REDUCIBLE N/A 04/24/2019    Procedure: REPAIR HERNIA UMBILICAL LAPAROSCOPIC W/ ROBOTICS;  Surgeon: Anahi Colon MD;  Location: [facility] Main OR;  Service: General    SD SPHINCTEROTOMY ANAL DIVISION SPHINCTER SPX N/A 08/31/2018    Procedure: EUA, LEFT PARTIAL INTERNAL SPHINCTEROTOMY;  Surgeon: Tien Reyes MD;  Location:  MAIN OR;  Service: Colorectal    SD TNOT ELBOW LATERAL/MEDIAL DEBRIDE OPEN TDN RPR Left 09/20/2022    Procedure: RELEASE EPICONDYLAR ELBOW MEDIAL;  Surgeon: Kyree Winkler MD;  Location[facility];  Service:  Orthopedics    REDUCTION MAMMAPLASTY Bilateral 1999    REPAIR LACERATION Left     left hand-2009    REPLACEMENT TOTAL KNEE Right     ROTATOR CUFF REPAIR Left     TONSILECTOMY AND ADNOIDECTOMY      US GUIDED MSK PROCEDURE  2021    VASCULAR SURGERY      VEIN LIGATION Bilateral     WISDOM TOOTH EXTRACTION        Family History:     Family History   Problem Relation Age of Onset    Kidney cancer Mother     Diabetes Mother     Depression Mother     Stroke Mother     Arthritis Mother     Cancer Mother     Psychiatric Illness Mother     No Known Problems Father     No Known Problems Sister     No Known Problems Maternal Grandmother     No Known Problems Maternal Grandfather     No Known Problems Paternal Grandmother     No Known Problems Paternal Grandfather     Colon cancer Maternal Uncle     Colon cancer Maternal Uncle     Colon cancer Paternal Uncle     Colon cancer Family     Alcohol abuse Sister     Asthma Sister       Social History:     Social History     Socioeconomic History    Marital status: Single     Spouse name: None    Number of children: None    Years of education: None    Highest education level: None   Occupational History    None   Tobacco Use    Smoking status: Former     Current packs/day: 0.00     Average packs/day: 2.0 packs/day for 33.0 years (66.0 ttl pk-yrs)     Types: Cigarettes     Start date: 1985     Quit date: 2018     Years since quittin.0    Smokeless tobacco: Never    Tobacco comments:     Started at age 16.   Vaping Use    Vaping status: Never Used   Substance and Sexual Activity    Alcohol use: Not Currently     Comment: Recovering alcoholic    Drug use: Yes     Types: Marijuana, Methamphetamines     Comment: 2 months off meth    Sexual activity: Not Currently     Partners: Male     Comment: defer   Other Topics Concern    None   Social History Narrative    None     Social Determinants of Health     Financial Resource Strain: Low Risk  (2024)    Overall  Financial Resource Strain (CARDIA)     Difficulty of Paying Living Expenses: Not hard at all   Food Insecurity: No Food Insecurity (11/7/2023)    Hunger Vital Sign     Worried About Running Out of Food in the Last Year: Never true     Ran Out of Food in the Last Year: Never true   Transportation Needs: No Transportation Needs (2/6/2024)    PRAPARE - Transportation     Lack of Transportation (Medical): No     Lack of Transportation (Non-Medical): No   Physical Activity: Not on file   Stress: Not on file   Social Connections: Not on file   Intimate Partner Violence: Not on file   Housing Stability: Unknown (11/7/2023)    Housing Stability Vital Sign     Unable to Pay for Housing in the Last Year: No     Number of Places Lived in the Last Year: Not on file     Unstable Housing in the Last Year: No      Medications and Allergies:     Current Outpatient Medications   Medication Sig Dispense Refill    atoMOXetine (STRATTERA) 40 mg capsule Take 1 capsule (40 mg total) by mouth daily 30 capsule 1    buPROPion (FORFIVO XL) 450 MG 24 hr tablet Take 1 tablet (450 mg total) by mouth daily 30 tablet 1    Cholecalciferol (Vitamin D3) 125 MCG (5000 UT) CAPS       cloNIDine (CATAPRES) 0.2 mg tablet Take 1 tablet (0.2 mg total) by mouth every 12 (twelve) hours 60 tablet 1    cyanocobalamin (VITAMIN B-12) 1000 MCG tablet Take 1 tablet (1,000 mcg total) by mouth daily 30 tablet 1    furosemide (LASIX) 20 mg tablet Take 1 tablet (20 mg total) by mouth daily 30 tablet 1    levothyroxine (Euthyrox) 125 mcg tablet Take 1 tablet (125 mcg total) by mouth daily in the early morning 30 tablet 1    losartan (COZAAR) 100 MG tablet Take 1 tablet (100 mg total) by mouth daily 30 tablet 1    lurasidone (LATUDA) 40 mg tablet       melatonin 3 mg Take 1 tablet (3 mg total) by mouth daily at bedtime 30 tablet 0    naltrexone (REVIA) 50 mg tablet Take 1 tablet (50 mg total) by mouth daily 30 tablet 1    OXcarbazepine (TRILEPTAL) 150 mg tablet Take 3  "tablets (450 mg total) by mouth every 12 (twelve) hours 180 tablet 1    oxybutynin (DITROPAN) 5 mg tablet Take 1 tablet (5 mg total) by mouth 2 (two) times a day 60 tablet 1    [START ON 2/28/2024] paliperidone palmitate ER (INVEGA) 234 mg/1.5 mL IM injection 1.5 mL (234 mg total) by IM- Deltoid route every 4 weeks Do not start before February 28, 2024.      pantoprazole (PROTONIX) 40 mg tablet Take 1 tablet (40 mg total) by mouth daily in the early morning 30 tablet 1    topiramate (TOPAMAX) 200 MG tablet Take 1 tablet (200 mg total) by mouth 2 (two) times a day 60 tablet 1    warfarin (COUMADIN) 5 mg tablet Take 1 tablet (5 mg total) by mouth daily 90 tablet 0    saccharomyces boulardii (FLORASTOR) 250 mg capsule Take 1 capsule (250 mg total) by mouth 2 (two) times a day (Patient not taking: Reported on 2/6/2024) 60 capsule 1     No current facility-administered medications for this visit.     Allergies   Allergen Reactions    Percocet [Oxycodone-Acetaminophen] Headache     Severe headaches   (denies issues with Tylenol)    Povidone Iodine Rash     Unsure if betadine or gauze post surgical. Got rash on leg.   Has  Had itchy rashes after every surgery prep and IV insertion    Chlorhexidine Rash     Per pt \"able to use the liquid soap--thinkd reaction from the sponges or wipes\"      Immunizations:     Immunization History   Administered Date(s) Administered    COVID-19 PFIZER VACCINE 0.3 ML IM 03/31/2021, 04/21/2021, 12/13/2021, 05/09/2022    COVID-19 Pfizer vac (Edwar-sucrose, gray cap) 12 yr+ IM 05/09/2022    INFLUENZA 08/07/2014, 07/29/2015, 10/10/2018, 11/10/2022, 10/18/2023    Influenza Split 07/29/2015    Influenza, injectable, quadrivalent, preservative free 0.5 mL 10/18/2023    Influenza, recombinant, quadrivalent,injectable, preservative free 11/27/2018, 09/20/2021, 11/10/2022    Tdap 01/26/2009, 07/23/2018    Tuberculin Skin Test-PPD Intradermal 12/10/2018, 01/26/2022      Health Maintenance:         Topic " Date Due    Cervical Cancer Screening  12/28/2023    Colorectal Cancer Screening  09/03/2030    HIV Screening  Completed    Hepatitis C Screening  Completed    Lung Cancer Screening  Discontinued         Topic Date Due    Pneumococcal Vaccine: Pediatrics (0 to 5 Years) and At-Risk Patients (6 to 64 Years) (1 - PCV) Never done    Hepatitis A Vaccine (1 of 2 - Risk 2-dose series) Never done    COVID-19 Vaccine (6 - 2023-24 season) 09/01/2023      Medicare Screening Tests and Risk Assessments:     Blanca is here for her Subsequent Wellness visit. Last Medicare Wellness visit information reviewed, patient interviewed and updates made to the record today.      Health Risk Assessment:   Patient rates overall health as fair. Patient feels that their physical health rating is same. Patient is satisfied with their life. Eyesight was rated as same. Hearing was rated as same. Patient feels that their emotional and mental health rating is slightly better. Patients states they are sometimes angry. Patient states they are sometimes unusually tired/fatigued. Pain experienced in the last 7 days has been none. Patient states that she has experienced no weight loss or gain in last 6 months.     Depression Screening:   PHQ-9 Score: 0      Fall Risk Screening:   In the past year, patient has experienced: no history of falling in past year      Urinary Incontinence Screening:   Patient has not leaked urine accidently in the last six months.     Home Safety:  Patient has trouble with stairs inside or outside of their home. Patient has working smoke alarms and has working carbon monoxide detector. Home safety hazards include: none.     Nutrition:   Current diet is Regular.     Medications:   Patient is not currently taking any over-the-counter supplements. Patient is not able to manage medications.     Activities of Daily Living (ADLs)/Instrumental Activities of Daily Living (IADLs):   Walk and transfer into and out of bed and chair?:  "Yes  Dress and groom yourself?: Yes    Bathe or shower yourself?: Yes    Feed yourself? Yes  Do your laundry/housekeeping?: Yes  Manage your money, pay your bills and track your expenses?: Yes  Make your own meals?: Yes    Do your own shopping?: Yes    Previous Hospitalizations:   Any hospitalizations or ED visits within the last 12 months?: Yes    How many hospitalizations have you had in the last year?: 1-2    Advance Care Planning:     Advanced directive: Yes      PREVENTIVE SCREENINGS      Cardiovascular Screening:    General: Screening Not Indicated and History Lipid Disorder      Diabetes Screening:     General: Screening Current      Colorectal Cancer Screening:     General: Screening Current      Breast Cancer Screening:     General: Screening Current      Cervical Cancer Screening:    General: Screening Current      Lung Cancer Screening:     General: Screening Current      Hepatitis C Screening:    General: Screening Current    Screening, Brief Intervention, and Referral to Treatment (SBIRT)    Screening  Typical number of drinks in a day: 0  Typical number of drinks in a week: 0  Interpretation: Low risk drinking behavior.    Time Spent  Time spent screening/evaluating the patient for alcohol misuse: 0 minutes. Time spent providing alcohol/substance abuse assessment and intervention services: 0 minutes.    Other Counseling Topics:   Car/seat belt/driving safety, skin self-exam, sunscreen and calcium and vitamin D intake and regular weightbearing exercise.     No results found.     Physical Exam:     /90 (BP Location: Left arm, Patient Position: Sitting, Cuff Size: Standard)   Pulse 73   Temp (!) 97.3 °F (36.3 °C) (Temporal)   Ht 5' 6\" (1.676 m)   Wt (!) 138 kg (304 lb)   SpO2 99%   BMI 49.07 kg/m²     Physical Exam  Vitals and nursing note reviewed.   Constitutional:       General: She is not in acute distress.     Appearance: Normal appearance. She is well-developed and normal weight.   HENT: "      Head: Normocephalic and atraumatic.      Right Ear: Tympanic membrane, ear canal and external ear normal. There is no impacted cerumen.      Left Ear: Tympanic membrane, ear canal and external ear normal. There is no impacted cerumen.      Nose: Nose normal.      Mouth/Throat:      Mouth: Mucous membranes are moist.   Eyes:      Extraocular Movements: Extraocular movements intact.      Conjunctiva/sclera: Conjunctivae normal.      Pupils: Pupils are equal, round, and reactive to light.   Cardiovascular:      Rate and Rhythm: Normal rate and regular rhythm.      Pulses: Normal pulses.      Heart sounds: Normal heart sounds. No murmur heard.     No friction rub.   Pulmonary:      Effort: Pulmonary effort is normal. No respiratory distress.      Breath sounds: Normal breath sounds.   Abdominal:      General: Bowel sounds are normal.      Palpations: Abdomen is soft.      Tenderness: There is no abdominal tenderness.   Musculoskeletal:         General: Normal range of motion.      Cervical back: Normal range of motion and neck supple.      Right lower leg: No edema.      Left lower leg: No edema.   Skin:     General: Skin is warm and dry.      Capillary Refill: Capillary refill takes less than 2 seconds.   Neurological:      General: No focal deficit present.      Mental Status: She is alert and oriented to person, place, and time. Mental status is at baseline.   Psychiatric:         Mood and Affect: Mood normal.         Behavior: Behavior normal.         Thought Content: Thought content normal.         Judgment: Judgment normal.          JHONATAN Armando

## 2024-02-06 NOTE — PROGRESS NOTES
[facility] PRE-OPERATIVE EVALUATION  [facility] PHYSICIAN GROUP - [facility]    NAME: Blanca Mason  AGE: 53 y.o. SEX: female  : 1970     DATE: 2024    [facility] Pre-Operative Evaluation      Chief Complaint: Pre-operative Evaluation     Surgery: MRI with sedation   Anticipated Date of Surgery: 2024  Referring Provider: Laure Madrid        History of Present Illness:     Blanca Mason is a 53 y.o. female who presents to the office today for a preoperative consultation at the request of surgeon, Laure Madrid , who plans on performing MRI with sedation on 2023. Planned anesthesia is IV sedation. Patient has a bleeding risk of: recent abnormal bleeding (Pulmonary Embolism). Patient does not have objections to receiving blood products if needed. Current anti-platelet/anti-coagulation medications that the patient is prescribed includes: Warfarin (Coumadin or Jantoven).      Assessment of Chronic Conditions:   - COPD: Albuterol  - Hypertension: furosemide, losartan, clonidine     Assessment of Cardiac Risk:  Reports unstable or severe angina or MI in the last 6 weeks or history of stent placement in the last year   Denies decompensated heart failure (e.g. New onset heart failure, NYHA functional class IV heart failure, or worsening existing heart failure)  Denies significant arrhythmias such as high grade AV block, symptomatic ventricular arrhythmia, newly recognized ventricular tachycardia, supraventricular tachycardia with resting heart rate >100, or symptomatic bradycardia  Denies severe heart valve disease including aortic stenosis or symptomatic mitral stenosis     Exercise Capacity:  Able to walk 4 blocks without symptoms?: Yes  Able to walk 2 flights without symptoms?: Yes    Prior Anesthesia Reactions: No     Personal history of venous thromboembolic disease? Yes, Recent pulmonary embolism on Coumadin     History of steroid use  for >2 weeks within last year? No         Review of Systems:     Review of Systems   Constitutional:  Negative for activity change, chills, fatigue and fever.   HENT:  Negative for congestion, ear pain, rhinorrhea, sore throat and trouble swallowing.    Eyes:  Negative for pain and visual disturbance.   Respiratory:  Negative for cough, chest tightness and shortness of breath.    Cardiovascular:  Negative for chest pain, palpitations and leg swelling.   Gastrointestinal:  Negative for abdominal pain, constipation, diarrhea, nausea and vomiting.   Genitourinary:  Negative for difficulty urinating, dysuria, hematuria and urgency.   Musculoskeletal:  Negative for arthralgias and back pain.   Skin:  Negative for color change and rash.   Neurological:  Negative for dizziness, seizures, syncope and headaches.   Psychiatric/Behavioral:  Negative for dysphoric mood. The patient is not nervous/anxious.    All other systems reviewed and are negative.      Current Problem List:     Patient Active Problem List   Diagnosis    Yan's esophagus determined by biopsy    Benign essential hypertension    Schizoaffective disorder, bipolar type ([facility])    Chronic low back pain    Edema    Family history of renal cancer    Hypothyroidism    MAG (obstructive sleep apnea)    Overactive bladder    Urinary incontinence    Major depressive disorder    Sjogren's syndrome ([facility])    COPD (chronic obstructive pulmonary disease) ([facility])    Mixed hyperlipidemia    BMI 45.0-49.9, adult ([facility])    Memory difficulties    History of COVID-19    Medical clearance for psychiatric admission    Chronic tension-type headache, intractable    Potential for cognitive impairment    Mood disorder ([facility])    Substance abuse ([facility])    Cognitive impairment    Contusion of elbow    Diarrhea    Ecchymosis    Anxiety    Borderline personality disorder ([facility])    Platelets decreased ([facility])    Knee pain, right    Suicidal deliberate poisoning ([facility])    Intentional self-harm by  "unspecified sharp object, sequela (HCC)    Chronic eczematous otitis externa of both ears    Chronic migraine without aura without status migrainosus, not intractable    Bilateral leg edema    Cervicalgia    History of pulmonary embolism    Elevated troponin    Tremors of nervous system    Intentional overdose (HCC)    Polysubstance abuse (HCC)    Restless leg syndrome    Acute saddle pulmonary embolism (HCC)    Acute deep vein thrombosis of lower leg, left (HCC)    Lymphedema    Toxic encephalopathy    Grief    History of DVT (deep vein thrombosis)    Ingrown toenail       Allergies:     Allergies   Allergen Reactions    Percocet [Oxycodone-Acetaminophen] Headache     Severe headaches   (denies issues with Tylenol)    Povidone Iodine Rash     Unsure if betadine or gauze post surgical. Got rash on leg.   Has  Had itchy rashes after every surgery prep and IV insertion    Chlorhexidine Rash     Per pt \"able to use the liquid soap--thinkd reaction from the sponges or wipes\"       Current Medications:       Current Outpatient Medications:     atoMOXetine (STRATTERA) 40 mg capsule, Take 1 capsule (40 mg total) by mouth daily, Disp: 30 capsule, Rfl: 1    buPROPion (FORFIVO XL) 450 MG 24 hr tablet, Take 1 tablet (450 mg total) by mouth daily, Disp: 30 tablet, Rfl: 1    Cholecalciferol (Vitamin D3) 125 MCG (5000 UT) CAPS, , Disp: , Rfl:     cloNIDine (CATAPRES) 0.2 mg tablet, Take 1 tablet (0.2 mg total) by mouth every 12 (twelve) hours, Disp: 60 tablet, Rfl: 1    cyanocobalamin (VITAMIN B-12) 1000 MCG tablet, Take 1 tablet (1,000 mcg total) by mouth daily, Disp: 30 tablet, Rfl: 1    furosemide (LASIX) 20 mg tablet, Take 1 tablet (20 mg total) by mouth daily, Disp: 30 tablet, Rfl: 1    levothyroxine (Euthyrox) 125 mcg tablet, Take 1 tablet (125 mcg total) by mouth daily in the early morning, Disp: 30 tablet, Rfl: 1    losartan (COZAAR) 100 MG tablet, Take 1 tablet (100 mg total) by mouth daily, Disp: 30 tablet, Rfl: 1    " lurasidone (LATUDA) 40 mg tablet, , Disp: , Rfl:     melatonin 3 mg, Take 1 tablet (3 mg total) by mouth daily at bedtime, Disp: 30 tablet, Rfl: 0    naltrexone (REVIA) 50 mg tablet, Take 1 tablet (50 mg total) by mouth daily, Disp: 30 tablet, Rfl: 1    OXcarbazepine (TRILEPTAL) 150 mg tablet, Take 3 tablets (450 mg total) by mouth every 12 (twelve) hours, Disp: 180 tablet, Rfl: 1    oxybutynin (DITROPAN) 5 mg tablet, Take 1 tablet (5 mg total) by mouth 2 (two) times a day, Disp: 60 tablet, Rfl: 1    [START ON 2/28/2024] paliperidone palmitate ER (INVEGA) 234 mg/1.5 mL IM injection, 1.5 mL (234 mg total) by IM- Deltoid route every 4 weeks Do not start before February 28, 2024., Disp: , Rfl:     pantoprazole (PROTONIX) 40 mg tablet, Take 1 tablet (40 mg total) by mouth daily in the early morning, Disp: 30 tablet, Rfl: 1    topiramate (TOPAMAX) 200 MG tablet, Take 1 tablet (200 mg total) by mouth 2 (two) times a day, Disp: 60 tablet, Rfl: 1    warfarin (COUMADIN) 5 mg tablet, Take 1 tablet (5 mg total) by mouth daily, Disp: 90 tablet, Rfl: 0    saccharomyces boulardii (FLORASTOR) 250 mg capsule, Take 1 capsule (250 mg total) by mouth 2 (two) times a day (Patient not taking: Reported on 2/6/2024), Disp: 60 capsule, Rfl: 1    Past Medical History:       Past Medical History:   Diagnosis Date    Anxiety     Arthritis     oa cassandra knees    Asthma     good control- no medications    Yan's esophagus     Bipolar affective disorder (HCC)     Cannabis abuse with unspecified 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HCC)     Chronic pain disorder     lumbar    COPD (chronic obstructive pulmonary disease) (HCC)     CPAP (continuous positive airway pressure) dependence     DDD (degenerative disc disease), lumbar 04/09/2015    Degenerative disc disease at L5-S1 level     Deliberate self-cutting     9/15/22per pt has not done recently-- does see a therapist and psychiatrist regularly    Depression 09/16/2008    Disease of thyroid gland     hypo  "   MARTINEZ (dyspnea on exertion)     per pt \"has had this with exertion and not new\"    Drug overdose 10/28/2008    suicide attempt and again drug overdose 7/2022-- [facility] floor and than [facility]    Dysphagia     Dyspnea     Edema     BLE    Family history of blood clots     GERD (gastroesophageal reflux disease)     Headache(784.0)     Headache, tension-type     Hemorrhage of rectum and anus 10/02/2017    Formatting of this note might be different from the original.  Added automatically from request for surgery [id number]    High blood pressure     History of COVID-19 12/30/2020    Symptoms started on 12/30/2020 and then got worse.  Today she is feeling a little bit better.  She is a candidate for monoclonal antibody infusion and set for 1/6/21 @ 1pm at [facility]. 01/07/21 - telemedicine -     Hyperlipidemia     Hypertension     Knee pain, bilateral     Especially right    Medial epicondylitis of elbow, left 04/03/2018    Formatting of this note might be different from the original.  Added automatically from request for surgery [id number]    Medial epicondylitis of right elbow 07/17/2023    Medical marijuana use     Memory loss     Migraines     Obesity     Overactive bladder     Primary osteoarthritis of both knees 08/08/2018    Pulmonary emboli (HCC)     Restrictive lung disease 02/01/2017    Last Assessment & Plan:   Formatting of this note might be different from the original.  I reviewed this problem throughout the rest of the note. Please refer to the other assessments for details.    Sjogren's disease (HCC)     Sleep apnea     Stress incontinence     Suicidal ideations     Teeth missing     Tremor     per pt Tremors of Right Leg and both Arms    Visual impairment     Wears glasses         Past Surgical History:   Procedure Laterality Date    BREAST CYST EXCISION Right 1989    CARPAL TUNNEL RELEASE Left     CHOLECYSTECTOMY  05/2003    Laparoscopic    COLONOSCOPY      01/12/2009    DILATION " AND CURETTAGE OF UTERUS      ELBOW SURGERY Left     x2. No hardware    ESOPHAGOGASTRODUODENOSCOPY      FOOT SURGERY Left     Plantar fasciotomy    HERNIA REPAIR      HYSTERECTOMY      laporoscopic, partial    KNEE ARTHROSCOPY Bilateral     OOPHORECTOMY Left 10/2015    NH GASTROCNEMIUS RECESSION Left 02/24/2021    Procedure: RECESSION GASTROC OPEN;  Surgeon: Nomi Yang DPM;  Location:  MAIN OR;  Service: Podiatry    NH RPR UMBILICAL HRNA 5 YRS/> REDUCIBLE N/A 04/24/2019    Procedure: REPAIR HERNIA UMBILICAL LAPAROSCOPIC W/ ROBOTICS;  Surgeon: Anahi Colon MD;  Location: [facility] Main OR;  Service: General    NH SPHINCTEROTOMY ANAL DIVISION SPHINCTER SPX N/A 08/31/2018    Procedure: EUA, LEFT PARTIAL INTERNAL SPHINCTEROTOMY;  Surgeon: Tien Reyes MD;  Location:  MAIN OR;  Service: Colorectal    NH TNOT ELBOW LATERAL/MEDIAL DEBRIDE OPEN TDN RPR Left 09/20/2022    Procedure: RELEASE EPICONDYLAR ELBOW MEDIAL;  Surgeon: Kyree Winkler MD;  Location: [facility] MAIN OR;  Service: Orthopedics    REDUCTION MAMMAPLASTY Bilateral 1999    REPAIR LACERATION Left     left hand-5/18/2009    REPLACEMENT TOTAL KNEE Right     ROTATOR CUFF REPAIR Left     TONSILECTOMY AND ADNOIDECTOMY      US GUIDED MSK PROCEDURE  04/22/2021    VASCULAR SURGERY      VEIN LIGATION Bilateral     WISDOM TOOTH EXTRACTION          Family History   Problem Relation Age of Onset    Kidney cancer Mother     Diabetes Mother     Depression Mother     Stroke Mother     Arthritis Mother     Cancer Mother     Psychiatric Illness Mother     No Known Problems Father     No Known Problems Sister     No Known Problems Maternal Grandmother     No Known Problems Maternal Grandfather     No Known Problems Paternal Grandmother     No Known Problems Paternal Grandfather     Colon cancer Maternal Uncle     Colon cancer Maternal Uncle     Colon cancer Paternal Uncle     Colon cancer Family     Alcohol abuse Sister     Asthma Sister         Social History      Socioeconomic History    Marital status: Single     Spouse name: Not on file    Number of children: Not on file    Years of education: Not on file    Highest education level: Not on file   Occupational History    Not on file   Tobacco Use    Smoking status: Former     Current packs/day: 0.00     Average packs/day: 2.0 packs/day for 33.0 years (66.0 ttl pk-yrs)     Types: Cigarettes     Start date: 1985     Quit date: 2018     Years since quittin.0    Smokeless tobacco: Never    Tobacco comments:     Started at age 16.   Vaping Use    Vaping status: Never Used   Substance and Sexual Activity    Alcohol use: Not Currently     Comment: Recovering alcoholic    Drug use: Yes     Types: Marijuana, Methamphetamines     Comment: 2 months off meth    Sexual activity: Not Currently     Partners: Male     Comment: defer   Other Topics Concern    Not on file   Social History Narrative    Not on file     Social Determinants of Health     Financial Resource Strain: Low Risk  (2024)    Overall Financial Resource Strain (CARDIA)     Difficulty of Paying Living Expenses: Not hard at all   Food Insecurity: No Food Insecurity (2023)    Hunger Vital Sign     Worried About Running Out of Food in the Last Year: Never true     Ran Out of Food in the Last Year: Never true   Transportation Needs: No Transportation Needs (2024)    PRAPARE - Transportation     Lack of Transportation (Medical): No     Lack of Transportation (Non-Medical): No   Physical Activity: Not on file   Stress: Not on file   Social Connections: Not on file   Intimate Partner Violence: Not on file   Housing Stability: Unknown (2023)    Housing Stability Vital Sign     Unable to Pay for Housing in the Last Year: No     Number of Places Lived in the Last Year: Not on file     Unstable Housing in the Last Year: No        Physical Exam:     /90 (BP Location: Left arm, Patient Position: Sitting, Cuff Size: Standard)   Pulse 73   Temp  "(!) 97.3 °F (36.3 °C) (Temporal)   Ht 5' 6\" (1.676 m)   Wt (!) 138 kg (304 lb)   SpO2 99%   BMI 49.07 kg/m²     Physical Exam  Vitals and nursing note reviewed.   Constitutional:       Appearance: Normal appearance. She is normal weight.   HENT:      Head: Normocephalic.      Right Ear: Tympanic membrane, ear canal and external ear normal. There is no impacted cerumen.      Left Ear: Tympanic membrane, ear canal and external ear normal. There is no impacted cerumen.      Nose: Nose normal.      Mouth/Throat:      Mouth: Mucous membranes are moist.      Pharynx: Oropharynx is clear.   Eyes:      Extraocular Movements: Extraocular movements intact.      Conjunctiva/sclera: Conjunctivae normal.      Pupils: Pupils are equal, round, and reactive to light.   Cardiovascular:      Rate and Rhythm: Normal rate and regular rhythm.      Pulses: Normal pulses.      Heart sounds: Normal heart sounds.   Pulmonary:      Effort: Pulmonary effort is normal.      Breath sounds: Normal breath sounds.   Abdominal:      General: Bowel sounds are normal. There is no distension.      Palpations: Abdomen is soft.      Tenderness: There is no abdominal tenderness.   Musculoskeletal:         General: Normal range of motion.      Cervical back: Normal range of motion.   Skin:     General: Skin is warm.      Capillary Refill: Capillary refill takes less than 2 seconds.   Neurological:      General: No focal deficit present.      Mental Status: She is alert and oriented to person, place, and time. Mental status is at baseline.   Psychiatric:         Mood and Affect: Mood normal.         Behavior: Behavior normal.         Thought Content: Thought content normal.         Judgment: Judgment normal.          Data:     Pre-operative work-up    Laboratory Results: I have personally reviewed the pertinent laboratory results/reports      EKG: I have personally reviewed pertinent reports.      Chest x-ray: I have personally reviewed pertinent " reports.        Previous cardiopulmonary studies within the past year:  Echocardiogram: n/a  Cardiac Catheterization: n/a  Stress Test: n/a  Pulmonary Function Testing: n/a       Assessment & Recommendations:     1. Preop examination        2. Cognitive impairment        3. Tremors of nervous system            Pre-Op Evaluation Assessment  53 y.o. female with planned surgery: MRI with sedation .    Known risk factors for perioperative complications: Coronary disease  Chronic pulmonary disease.        Current medications which may produce withdrawal symptoms if withheld perioperatively: oxcarbazepine, lurasidone, clonidine.    Pre-Op Evaluation Plan  1. Further preoperative workup as follows:   - None; no further preoperative work-up is required    2. Medication Management/Recommendations:   - None, continue medication regimen including morning of surgery, with sip of water  - Patient has been instructed to avoid herbs or non-directed vitamins the week prior to surgery to ensure no drug interactions with perioperative surgical and anesthetic medications.  - Patient should continue antihypertensive medications up through and including the day of surgery.   - Patient should hold diuretic medication the day of surgery.  - Patient has been instructed to avoid aspirin containing medications or non-steroidal anti-inflammatory drugs for the week preceding surgery.    3. Prophylaxis for cardiac events with perioperative beta-blockers: not indicated.    4. Patient requires further consultation with: None    Clearance  Patient is CLEARED for surgery without any additional cardiac testing.     JHONATAN Armando  [address] [address]  Phone#  [phone]  Fax#  [phone]

## 2024-02-07 ENCOUNTER — TELEPHONE (OUTPATIENT)
Dept: HEMATOLOGY ONCOLOGY | Facility: CLINIC | Age: 54
End: 2024-02-07

## 2024-02-07 ENCOUNTER — APPOINTMENT (OUTPATIENT)
Dept: LAB | Facility: CLINIC | Age: 54
End: 2024-02-07
Payer: MEDICARE

## 2024-02-07 DIAGNOSIS — I26.92 ACUTE SADDLE PULMONARY EMBOLISM (HCC): ICD-10-CM

## 2024-02-07 DIAGNOSIS — I82.4Z2 ACUTE DEEP VEIN THROMBOSIS OF LOWER LEG, LEFT (HCC): ICD-10-CM

## 2024-02-07 LAB
INR PPP: 2.1 (ref 0.84–1.19)
PROTHROMBIN TIME: 23.2 SECONDS (ref 11.6–14.5)

## 2024-02-07 PROCEDURE — 36415 COLL VENOUS BLD VENIPUNCTURE: CPT

## 2024-02-07 PROCEDURE — 85610 PROTHROMBIN TIME: CPT

## 2024-02-07 NOTE — TELEPHONE ENCOUNTER
Inform patient that her INR is 2.1.  Advised her to continue taking the Coumadin 5 mg and we will recheck her counts next Wednesday and I will call her with the results.  Patient voiced understanding.  Does not require refill at this time.

## 2024-02-08 ENCOUNTER — PATIENT OUTREACH (OUTPATIENT)
Dept: CASE MANAGEMENT | Facility: OTHER | Age: 54
End: 2024-02-08

## 2024-02-08 NOTE — PRE-PROCEDURE INSTRUCTIONS
Pre-Surgery Instructions:   Medication Instructions    Cholecalciferol (Vitamin D3) 125 MCG (5000 UT) CAPS Hold day of surgery.    cloNIDine (CATAPRES) 0.2 mg tablet Take day of surgery.    cyanocobalamin (VITAMIN B-12) 1000 MCG tablet Hold day of surgery.    furosemide (LASIX) 20 mg tablet Hold day of surgery.    levothyroxine (Euthyrox) 125 mcg tablet Take day of surgery.    losartan (COZAAR) 100 MG tablet Hold day of surgery.    lurasidone (LATUDA) 40 mg tablet Take day of surgery.    melatonin 3 mg Take night before surgery    naltrexone (REVIA) 50 mg tablet Take day of surgery.    OXcarbazepine (TRILEPTAL) 150 mg tablet Take day of surgery.    oxybutynin (DITROPAN) 5 mg tablet Take day of surgery.    [START ON 2/28/2024] paliperidone palmitate ER (INVEGA) 234 mg/1.5 mL IM injection Instructions provided by MD    pantoprazole (PROTONIX) 40 mg tablet Take day of surgery.    topiramate (TOPAMAX) 200 MG tablet Take day of surgery.    warfarin (COUMADIN) 5 mg tablet Take night before surgery    The patient should have nothing to eat or drink after 11 pm the night before the MRI. The patient may take their medications with a sip of water at least 2 hours prior to their arrival time.  You will receive a call the evening before your MRI appointment with additional instructions.      Please leave your jewelry and valuables at home, wedding rings are the exception.   Please bring your physician order, insurance cards, a form of photo ID and a list of your medications with you. Arrive 15 minutes prior to your appointment time in order to register. Please bring any prior CT or MRI studies of this area that were not performed at a [facility].

## 2024-02-08 NOTE — PROGRESS NOTES
"Outpatient Care Management Note:  Received return call from Blanca. She is doing \"better\" since discharge.  She is seeing [facility] three times a week currently.  Reviewed medication changes on AVS.  Blanca states compliance and [facility] is monitoring her compliance.     Discussed BYP.  Blanca is in agreement that her mental health takes priority at this time.  She agrees that BYP is not a priority right now. She feels that the education that was received is sufficient at this time.  She has been seen by her PCP since discharge.  Closing care management episode at this time.   "

## 2024-02-13 ENCOUNTER — ANESTHESIA EVENT (OUTPATIENT)
Dept: RADIOLOGY | Facility: HOSPITAL | Age: 54
End: 2024-02-13

## 2024-02-13 NOTE — ANESTHESIA PREPROCEDURE EVALUATION
Procedure:  MRI BRAIN NEUROQUANT WO AND W CONTRAST    Relevant Problems   CARDIO   (+) Acute deep vein thrombosis of lower leg, left (HCC)   (+) Benign essential hypertension   (+) Chronic migraine without aura without status migrainosus, not intractable   (+) Mixed hyperlipidemia      ENDO   (+) Hypothyroidism      MUSCULOSKELETAL   (+) Chronic low back pain      NEURO/PSYCH   (+) Anxiety   (+) Chronic low back pain   (+) Chronic migraine without aura without status migrainosus, not intractable   (+) Chronic tension-type headache, intractable   (+) Major depressive disorder      PULMONARY   (+) COPD (chronic obstructive pulmonary disease) (HCC)   (+) MAG (obstructive sleep apnea)      Digestive   (+) Yan's esophagus determined by biopsy      Other   (+) BMI 45.0-49.9, adult (HCC)   (+) Bilateral leg edema   (+) Cognitive impairment   (+) Edema   (+) Elevated troponin   (+) History of COVID-19   (+) History of DVT (deep vein thrombosis)   (+) History of pulmonary embolism   (+) Intentional overdose ([facility])   (+) Lymphedema   (+) Polysubstance abuse ([facility])   (+) Potential for cognitive impairment   (+) Schizoaffective disorder, bipolar type ([facility])   (+) Sjogren's syndrome (HCC)   (+) Substance abuse ([facility])      EKG, CBC and BMP: normal      Physical Exam    Airway    Mallampati score: III  TM Distance: >3 FB  Neck ROM: full     Dental       Cardiovascular      Pulmonary      Other Findings  post-pubertal.      Anesthesia Plan  ASA Score- 3     Anesthesia Type-          Additional Monitors:     Airway Plan: ETT and LMA.    Comment: Will attempt LMA first, ETT if LMA is not well seated.       Plan Factors-    Chart reviewed.   Existing labs reviewed. Patient summary reviewed.    Patient is not a current smoker.  Patient did not smoke on day of surgery.            Induction- intravenous.    Postoperative Plan- . Planned trial extubation    Informed Consent- Anesthetic plan and risks discussed with patient.  I personally  reviewed this patient with the CRNA. Discussed and agreed on the Anesthesia Plan with the CRNA..

## 2024-02-14 ENCOUNTER — ANESTHESIA (OUTPATIENT)
Dept: RADIOLOGY | Facility: HOSPITAL | Age: 54
End: 2024-02-14

## 2024-02-14 ENCOUNTER — TELEPHONE (OUTPATIENT)
Dept: HEMATOLOGY ONCOLOGY | Facility: CLINIC | Age: 54
End: 2024-02-14

## 2024-02-14 ENCOUNTER — APPOINTMENT (OUTPATIENT)
Dept: LAB | Facility: CLINIC | Age: 54
End: 2024-02-14
Payer: MEDICARE

## 2024-02-14 ENCOUNTER — HOSPITAL ENCOUNTER (OUTPATIENT)
Dept: RADIOLOGY | Facility: HOSPITAL | Age: 54
Discharge: HOME | End: 2024-02-14
Attending: PSYCHIATRY & NEUROLOGY
Payer: MEDICARE

## 2024-02-14 VITALS
SYSTOLIC BLOOD PRESSURE: 141 MMHG | TEMPERATURE: 97.2 F | RESPIRATION RATE: 18 BRPM | OXYGEN SATURATION: 96 % | HEART RATE: 65 BPM | HEIGHT: 66 IN | WEIGHT: 293 LBS | BODY MASS INDEX: 47.09 KG/M2 | DIASTOLIC BLOOD PRESSURE: 84 MMHG

## 2024-02-14 DIAGNOSIS — R41.89 COGNITIVE IMPAIRMENT: ICD-10-CM

## 2024-02-14 DIAGNOSIS — R25.1 TREMORS OF NERVOUS SYSTEM: ICD-10-CM

## 2024-02-14 PROCEDURE — 70553 MRI BRAIN STEM W/O & W/DYE: CPT

## 2024-02-14 PROCEDURE — G1004 CDSM NDSC: HCPCS

## 2024-02-14 PROCEDURE — A9585 GADOBUTROL INJECTION: HCPCS

## 2024-02-14 RX ORDER — PROPOFOL 10 MG/ML
INJECTION, EMULSION INTRAVENOUS AS NEEDED
Status: DISCONTINUED | OUTPATIENT
Start: 2024-02-14 | End: 2024-02-14

## 2024-02-14 RX ORDER — GADOBUTROL 604.72 MG/ML
13 INJECTION INTRAVENOUS
Status: COMPLETED | OUTPATIENT
Start: 2024-02-14 | End: 2024-02-14

## 2024-02-14 RX ORDER — ONDANSETRON 2 MG/ML
INJECTION INTRAMUSCULAR; INTRAVENOUS AS NEEDED
Status: DISCONTINUED | OUTPATIENT
Start: 2024-02-14 | End: 2024-02-14

## 2024-02-14 RX ORDER — SODIUM CHLORIDE, SODIUM LACTATE, POTASSIUM CHLORIDE, CALCIUM CHLORIDE 600; 310; 30; 20 MG/100ML; MG/100ML; MG/100ML; MG/100ML
INJECTION, SOLUTION INTRAVENOUS CONTINUOUS PRN
Status: DISCONTINUED | OUTPATIENT
Start: 2024-02-14 | End: 2024-02-14

## 2024-02-14 RX ORDER — LIDOCAINE HYDROCHLORIDE 10 MG/ML
INJECTION, SOLUTION EPIDURAL; INFILTRATION; INTRACAUDAL; PERINEURAL AS NEEDED
Status: DISCONTINUED | OUTPATIENT
Start: 2024-02-14 | End: 2024-02-14

## 2024-02-14 RX ADMIN — SODIUM CHLORIDE, SODIUM LACTATE, POTASSIUM CHLORIDE, AND CALCIUM CHLORIDE: .6; .31; .03; .02 INJECTION, SOLUTION INTRAVENOUS at 14:29

## 2024-02-14 RX ADMIN — LIDOCAINE HYDROCHLORIDE 50 MG: 10 INJECTION, SOLUTION EPIDURAL; INFILTRATION; INTRACAUDAL; PERINEURAL at 14:33

## 2024-02-14 RX ADMIN — PROPOFOL 250 MG: 10 INJECTION, EMULSION INTRAVENOUS at 14:33

## 2024-02-14 RX ADMIN — GADOBUTROL 13 ML: 604.72 INJECTION INTRAVENOUS at 15:16

## 2024-02-14 RX ADMIN — SODIUM CHLORIDE, SODIUM LACTATE, POTASSIUM CHLORIDE, AND CALCIUM CHLORIDE: .6; .31; .03; .02 INJECTION, SOLUTION INTRAVENOUS at 15:09

## 2024-02-14 RX ADMIN — ONDANSETRON 4 MG: 2 INJECTION INTRAMUSCULAR; INTRAVENOUS at 14:33

## 2024-02-14 NOTE — NURSING NOTE
Pt arrived after going to the wrong hospital at first--she went to [facility]. Contacted patient and assured her she would be able to get scanned.     MRI brain NeuroQuant w/without contrast complete. Pt tolerated well.     VSS post procedure. Pt alert and oriented on room air. No complaints or visible signs of distress.    Report given to Coretta [facility] RN.  Transported back to [facility].

## 2024-02-14 NOTE — TELEPHONE ENCOUNTER
LVM for patient informing her that her INR was 2.77 and to continue to taking the current dose of coumadin, which is 5 mg.

## 2024-02-14 NOTE — ANESTHESIA POSTPROCEDURE EVALUATION
Post-Op Assessment Note    CV Status:  Stable    Pain management: adequate       Mental Status:  Alert and awake   Hydration Status:  Euvolemic   PONV Controlled:  Controlled   Airway Patency:  Patent     Post Op Vitals Reviewed: Yes    No anethesia notable event occurred.    Staff: Anesthesiologist, CRNA               /81 (02/14/24 1525)    Temp 98.6 °F (37 °C) (02/14/24 1525)    Pulse 70 (02/14/24 1525)   Resp 15 (02/14/24 1525)    SpO2 94 % (02/14/24 1525)

## 2024-02-15 ENCOUNTER — TELEPHONE (OUTPATIENT)
Age: 54
End: 2024-02-15

## 2024-02-15 DIAGNOSIS — M54.2 CERVICALGIA: Primary | ICD-10-CM

## 2024-02-15 NOTE — TELEPHONE ENCOUNTER
Caller: ana Rios    Doctor: Coco    Reason for call: pt needs  a new order for an MRI stating outpatient. Pt would like a phone call, when the order is placed so she can call  to schedule the MRI.    Call back#: [phone]

## 2024-02-15 NOTE — TELEPHONE ENCOUNTER
S/w pt and advised new MRI order was placed, pt verbalized understanding and appreciative of call.

## 2024-02-16 NOTE — TELEPHONE ENCOUNTER
Patient called back scheduled with first available appt; 2/22/24 @ 9am with Chhaya Toney in the CV Office.

## 2024-02-16 NOTE — TELEPHONE ENCOUNTER
Called patient and I left a message to lindsey back to reschedule her Botox. If the patient calls back please schedule in first available.

## 2024-02-19 ENCOUNTER — TELEPHONE (OUTPATIENT)
Dept: NEUROLOGY | Facility: CLINIC | Age: 54
End: 2024-02-19

## 2024-02-20 ENCOUNTER — CONSULT (OUTPATIENT)
Dept: PULMONOLOGY | Facility: CLINIC | Age: 54
End: 2024-02-20
Payer: MEDICARE

## 2024-02-20 VITALS
HEIGHT: 66 IN | OXYGEN SATURATION: 96 % | TEMPERATURE: 97.5 F | HEART RATE: 69 BPM | DIASTOLIC BLOOD PRESSURE: 80 MMHG | BODY MASS INDEX: 47.09 KG/M2 | SYSTOLIC BLOOD PRESSURE: 126 MMHG | WEIGHT: 293 LBS

## 2024-02-20 DIAGNOSIS — F17.211 CIGARETTE NICOTINE DEPENDENCE IN REMISSION: Primary | ICD-10-CM

## 2024-02-20 PROCEDURE — 99203 OFFICE O/P NEW LOW 30 MIN: CPT | Performed by: STUDENT IN AN ORGANIZED HEALTH CARE EDUCATION/TRAINING PROGRAM

## 2024-02-20 RX ORDER — OXCARBAZEPINE 300 MG/1
300 TABLET, FILM COATED ORAL EVERY 12 HOURS SCHEDULED
COMMUNITY
Start: 2024-02-02

## 2024-02-20 NOTE — PROGRESS NOTES
Pulmonary Outpatient Consultation Note   Blanca Mason 53 y.o. female MRN: [id number]  2/20/2024    Referring provider:   No referring provider defined for this encounter.     Reason for Consultation:  Pulm embolism     No problem-specific Assessment & Plan notes found for this encounter.      Assessment:    PE this sounds unprovoked, likely acute intermediate high risk with elevated biomarkers and RV strain on imaging, along with risk factors of family history of DVTs and an active lifestyle.  She follows with hematology service for Coumadin management, goal INR 2-3  DVT L distal femoral and September 2023 at the same time of her PE  History of alcohol use  Severe MAG on NIV BiPAP with [facility] Dr. Jeffery, in the past was on AutoPap 12-20 but now switched over to BiPAP and will follow-up with sleep medicine this week.  ?COPD no obstruction, does have air trapping on PFTs, no significant emphysema on imaging, uses rescue albuterol as needed  Family history of blood clots   Ex smoker quit 6 years ago smoked since age 15 2 PPD  Medical marijuana   Schizoaffective disorder    Plan:    Continue with Coumadin as managed by hematology service  BiPAP per sleep medicine team  Patient is very adamant about weight loss, she has a plan to see bariatric surgery for weight loss   Follow up in 6 months     History of Present Illness   HPI:    53-year-old female referred to pulmonary due to pulmonary embolism diagnosed September 7, 2023, initially placed on IV heparin and then Eliquis, a week later came back to the hospital due to persistent chest discomfort and was found to have an unchanged clot therefore they felt Eliquis was interacting with one of her medications and she was switched over to Coumadin.  At that time he had a large pulmonary embolism with RV strain.  Patient also has a history of suicide attempts and has several hospitalizations with the psychiatric department.  Also has a history of Sjogren's disease apparently on  "pilocarpine, overactive bladder, hypertension, MAG on BiPAP, hypothyroid on Synthroid, apparent history of COPD on Breo.    Patient here for evaluation.  Patient states she gets short of breath with ambulation, she attributes all this from weight gain. She quit smoking 6 years ago. She is adamant about weight loss, says she is a picky eater and wants to see bariatric surgery soon for weight management.     PFT with air trapping, no obstruction   Echo normal Sept 2023 normal RV    Review of Systems   Constitutional:  Positive for activity change and unexpected weight change.   HENT: Negative.     Eyes: Negative.    Respiratory:  Positive for shortness of breath.    Cardiovascular: Negative.    Gastrointestinal: Negative.    Endocrine: Negative.    Genitourinary: Negative.    Musculoskeletal:  Positive for arthralgias and joint swelling.   Skin: Negative.    Allergic/Immunologic: Negative.    Neurological: Negative.    Hematological: Negative.          Historical Information   Past Medical History:   Diagnosis Date   • Anxiety    • Arthritis     oa cassandra knees   • Asthma     good control- no medications   • Yan's esophagus    • Bipolar affective disorder (HCC)    • Cannabis abuse with unspecified 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HCC)    • Chronic pain disorder     lumbar   • COPD (chronic obstructive pulmonary disease) (HCC)    • CPAP (continuous positive airway pressure) dependence    • DDD (degenerative disc disease), lumbar 04/09/2015   • Degenerative disc disease at L5-S1 level    • Deliberate self-cutting     9/15/22per pt has not done recently-- does see a therapist and psychiatrist regularly   • Depression 09/16/2008   • Disease of thyroid gland     hypo   • MARTINEZ (dyspnea on exertion)     per pt \"has had this with exertion and not new\"   • Drug overdose 10/28/2008    suicide attempt and again drug overdose 7/2022--[facility] floor and than transferred to [facility]   • Dysphagia    • Dyspnea    • Edema     BLE "   • Family history of blood clots    • GERD (gastroesophageal reflux disease)    • Headache(784.0)    • Headache, tension-type    • Hemorrhage of rectum and anus 10/02/2017    Formatting of this note might be different from the original.  Added automatically from request for surgery [id number]   • High blood pressure    • History of COVID-19 12/30/2020    Symptoms started on 12/30/2020 and then got worse.  Today she is feeling a little bit better.  She is a candidate for monoclonal antibody infusion and set for 1/6/21 @ 1pm at [facility]. 01/07/21 - telemedicine -    • Hyperlipidemia    • Hypertension    • Knee pain, bilateral     Especially right   • Medial epicondylitis of elbow, left 04/03/2018    Formatting of this note might be different from the original.  Added automatically from request for surgery [id number]   • Medial epicondylitis of right elbow 07/17/2023   • Medical marijuana use    • Memory loss    • Migraines    • Obesity    • Overactive bladder    • Primary osteoarthritis of both knees 08/08/2018   • Pulmonary emboli (HCC)    • Restrictive lung disease 02/01/2017    Last Assessment & Plan:   Formatting of this note might be different from the original.  I reviewed this problem throughout the rest of the note. Please refer to the other assessments for details.   • Sjogren's disease (HCC)    • Sleep apnea    • Stress incontinence    • Suicidal ideations    • Teeth missing    • Tremor     per pt Tremors of Right Leg and both Arms   • Visual impairment    • Wears glasses      Past Surgical History:   Procedure Laterality Date   • BREAST CYST EXCISION Right 1989   • CARPAL TUNNEL RELEASE Left    • CHOLECYSTECTOMY  05/2003    Laparoscopic   • COLONOSCOPY      01/12/2009   • DILATION AND CURETTAGE OF UTERUS     • ELBOW SURGERY Left     x2. No hardware   • ESOPHAGOGASTRODUODENOSCOPY     • FOOT SURGERY Left     Plantar fasciotomy   • HERNIA REPAIR     • HYSTERECTOMY      laporoscopic, partial   • KNEE  ARTHROSCOPY Bilateral    • OOPHORECTOMY Left 10/2015   • VT GASTROCNEMIUS RECESSION Left 02/24/2021    Procedure: RECESSION GASTROC OPEN;  Surgeon: Nomi Yang DPM;  Location:  MAIN OR;  Service: Podiatry   • VT RPR UMBILICAL HRNA 5 YRS/> REDUCIBLE N/A 04/24/2019    Procedure: REPAIR HERNIA UMBILICAL LAPAROSCOPIC W/ ROBOTICS;  Surgeon: Anahi Colon MD;  Location: [facility] Main OR;  Service: General   • VT SPHINCTEROTOMY ANAL DIVISION SPHINCTER SPX N/A 08/31/2018    Procedure: EUA, LEFT PARTIAL INTERNAL SPHINCTEROTOMY;  Surgeon: Tien Reyes MD;  Location:  MAIN OR;  Service: Colorectal   • VT TNOT ELBOW LATERAL/MEDIAL DEBRIDE OPEN TDN RPR Left 09/20/2022    Procedure: RELEASE EPICONDYLAR ELBOW MEDIAL;  Surgeon: Kyree Winkler MD;  Location[facility];  Service: Orthopedics   • REDUCTION MAMMAPLASTY Bilateral 1999   • REPAIR LACERATION Left     left hand-5/18/2009   • REPLACEMENT TOTAL KNEE Right    • ROTATOR CUFF REPAIR Left    • TONSILECTOMY AND ADNOIDECTOMY     • US GUIDED MSK PROCEDURE  04/22/2021   • VASCULAR SURGERY     • VEIN LIGATION Bilateral    • WISDOM TOOTH EXTRACTION       Family History   Problem Relation Age of Onset   • Kidney cancer Mother    • Diabetes Mother    • Depression Mother    • Stroke Mother    • Arthritis Mother    • Cancer Mother    • Psychiatric Illness Mother    • No Known Problems Father    • No Known Problems Sister    • No Known Problems Maternal Grandmother    • No Known Problems Maternal Grandfather    • No Known Problems Paternal Grandmother    • No Known Problems Paternal Grandfather    • Colon cancer Maternal Uncle    • Colon cancer Maternal Uncle    • Colon cancer Paternal Uncle    • Colon cancer Family    • Alcohol abuse Sister    • Asthma Sister        Occupational History: na    Social History     Tobacco Use   Smoking Status Former   • Current packs/day: 0.00   • Average packs/day: 2.0 packs/day for 33.0 years (66.0 ttl pk-yrs)   • Types: Cigarettes   •  Start date: 1985   • Quit date: 2018   • Years since quittin.1   Smokeless Tobacco Never   Tobacco Comments    Started at age 16.       Meds/Allergies     Current Outpatient Medications:   •  atoMOXetine (STRATTERA) 40 mg capsule, Take 1 capsule (40 mg total) by mouth daily, Disp: 30 capsule, Rfl: 1  •  buPROPion (FORFIVO XL) 450 MG 24 hr tablet, Take 1 tablet (450 mg total) by mouth daily, Disp: 30 tablet, Rfl: 1  •  Cholecalciferol (Vitamin D3) 125 MCG (5000 UT) CAPS, , Disp: , Rfl:   •  cloNIDine (CATAPRES) 0.2 mg tablet, Take 1 tablet (0.2 mg total) by mouth every 12 (twelve) hours, Disp: 60 tablet, Rfl: 1  •  cyanocobalamin (VITAMIN B-12) 1000 MCG tablet, Take 1 tablet (1,000 mcg total) by mouth daily, Disp: 30 tablet, Rfl: 1  •  furosemide (LASIX) 20 mg tablet, Take 1 tablet (20 mg total) by mouth daily, Disp: 30 tablet, Rfl: 1  •  levothyroxine (Euthyrox) 125 mcg tablet, Take 1 tablet (125 mcg total) by mouth daily in the early morning, Disp: 30 tablet, Rfl: 1  •  losartan (COZAAR) 100 MG tablet, Take 1 tablet (100 mg total) by mouth daily, Disp: 30 tablet, Rfl: 1  •  melatonin 3 mg, Take 1 tablet (3 mg total) by mouth daily at bedtime, Disp: 30 tablet, Rfl: 0  •  naltrexone (REVIA) 50 mg tablet, Take 1 tablet (50 mg total) by mouth daily, Disp: 30 tablet, Rfl: 1  •  oxybutynin (DITROPAN) 5 mg tablet, Take 1 tablet (5 mg total) by mouth 2 (two) times a day, Disp: 60 tablet, Rfl: 1  •  [START ON 2024] paliperidone palmitate ER (INVEGA) 234 mg/1.5 mL IM injection, 1.5 mL (234 mg total) by IM- Deltoid route every 4 weeks Do not start before 2024., Disp: , Rfl:   •  pantoprazole (PROTONIX) 40 mg tablet, Take 1 tablet (40 mg total) by mouth daily in the early morning, Disp: 30 tablet, Rfl: 1  •  topiramate (TOPAMAX) 200 MG tablet, Take 1 tablet (200 mg total) by mouth 2 (two) times a day, Disp: 60 tablet, Rfl: 1  •  warfarin (COUMADIN) 5 mg tablet, Take 1 tablet (5 mg total) by mouth  "daily, Disp: 90 tablet, Rfl: 0  •  lurasidone (LATUDA) 40 mg tablet, , Disp: , Rfl:   •  OXcarbazepine (TRILEPTAL) 150 mg tablet, Take 3 tablets (450 mg total) by mouth every 12 (twelve) hours, Disp: 180 tablet, Rfl: 1  •  OXcarbazepine (TRILEPTAL) 300 mg tablet, Take 300 mg by mouth every 12 (twelve) hours, Disp: , Rfl:   •  saccharomyces boulardii (FLORASTOR) 250 mg capsule, Take 1 capsule (250 mg total) by mouth 2 (two) times a day, Disp: 60 capsule, Rfl: 1  Allergies   Allergen Reactions   • Percocet [Oxycodone-Acetaminophen] Headache     Severe headaches   (denies issues with Tylenol)   • Povidone Iodine Rash     Unsure if betadine or gauze post surgical. Got rash on leg.   Has  Had itchy rashes after every surgery prep and IV insertion   • Chlorhexidine Rash     Per pt \"able to use the liquid soap--thinkd reaction from the sponges or wipes\"       Vitals: Blood pressure 126/80, pulse 69, temperature 97.5 °F (36.4 °C), height 5' 6\" (1.676 m), weight (!) 140 kg (308 lb 12.8 oz), SpO2 96%, not currently breastfeeding., Body mass index is 49.84 kg/m². Oxygen Therapy  SpO2: 96 %  Oxygen Therapy: None (Room air)    Physical Exam:    GEN: alert and oriented x 3, pleasant and cooperative   HEENT:  Normocephalic, atraumatic, anicteric  NECK: No JVD or carotid bruits   HEART: Rate, normal S1 and S2  LUNGS: Clear to auscultation bilaterally; no wheezes, rales, or rhonchi; respiration nonlabored   ABDOMEN:  Normoactive bowel sounds, soft, no tenderness, no distention  EXTREMITIES: peripheral pulses palpable; no edema  NEURO: no gross focal findings; cranial nerves grossly intact   SKIN:  Dry, intact, warm to touch    Labs: I have personally reviewed pertinent lab results.  No results found for: \"IGE\"    Imaging and other studies: I have personally reviewed pertinent films in PACS    Pulmonary function testing:  Personally interpreted by me    Other Studies: I have personally reviewed pertinent reports.      Chaparrita He " MD  Pulmonary and Critical Care Medicine

## 2024-02-20 NOTE — PATIENT INSTRUCTIONS
It was a pleasure seeing you today!    Obtain CT Chest in November 2-24  Follow up in 6 months     Chaparrita He MD  Pulmonary and Critical Care Medicine

## 2024-02-21 ENCOUNTER — EVALUATION (OUTPATIENT)
Dept: PHYSICAL THERAPY | Age: 54
End: 2024-02-21
Payer: MEDICARE

## 2024-02-21 DIAGNOSIS — I89.0 LYMPHEDEMA: Primary | ICD-10-CM

## 2024-02-21 PROCEDURE — 97163 PT EVAL HIGH COMPLEX 45 MIN: CPT

## 2024-02-21 PROCEDURE — 97110 THERAPEUTIC EXERCISES: CPT

## 2024-02-21 NOTE — PROGRESS NOTES
"PT Evaluation     Today's date: 2024  Patient name: Blanca Mason  : 1970  MRN: [id number]  Referring provider: Shameka Phillips CRNP  Dx:   Encounter Diagnosis     ICD-10-CM    1. Lymphedema  I89.0 Ambulatory Referral to PT/OT Lymphedema Therapy                     Assessment/Plan    Subjective Evaluation    History of Present Illness  Onset date: hx of chronic bilateral le lymphedema + 5 years with recent exacerbation with sleepin in recliner 24 reported recent hospitalization -24.          Recurrent probem    Quality of life: fair    Pain  Current pain rating: 3  At best pain rating: 3  At worst pain ratin  Location: left knee  pain entire joint  Quality: dull ache, sharp, tight and pulling  Relieving factors: change in position, ice, heat and rest  Aggravating factors: walking, standing, stair climbing, sitting, lifting and running  Progression: worsening    Social Support  Exterior steps/ramp assessed: has elevator.  Stairs in house: no   Lives in: apartment  Lives with: alone    Employment status: not working (will start working part time on 3/12)  Hand dominance: right  Exercise history: sedentary      Objective           Precautions: Allergies  Reviewed by Sarah Elam RN at 10:28 AM   Severity Reactions Comments   Percocet [oxycodone-acetaminophen] High Headache Severe headaches   (denies issues with Tylenol)   Povidone Iodine Medium Rash Unsure if betadine or gauze post surgical. Got rash on leg. Has  Had itchy rashes after every surgery prep and IV insertion   Chlorhexidine Low Rash Per pt \"able to use the liquid soap--thinkd reaction from the sponges or wipes\"         Manuals 24             I eval             Mld massage and soft tissue mobilization both le's              Fit for compression knee highs  Pt to bring in her knee highs for assessment by PT next visit                         Neuro Re-Ed             Nu step              Le lymphedema exer  10 reps each "                                                                             Ther Ex                                                                                                                     Ther Activity                                       Gait Training                                       Modalities             Bilateral le compression pump with le elevation 40 mmHG

## 2024-02-22 ENCOUNTER — OFFICE VISIT (OUTPATIENT)
Dept: SLEEP CENTER | Facility: CLINIC | Age: 54
End: 2024-02-22
Payer: MEDICARE

## 2024-02-22 ENCOUNTER — TELEPHONE (OUTPATIENT)
Dept: HEMATOLOGY ONCOLOGY | Facility: CLINIC | Age: 54
End: 2024-02-22

## 2024-02-22 ENCOUNTER — PROCEDURE VISIT (OUTPATIENT)
Dept: NEUROLOGY | Facility: CLINIC | Age: 54
End: 2024-02-22
Payer: MEDICARE

## 2024-02-22 ENCOUNTER — APPOINTMENT (OUTPATIENT)
Dept: LAB | Facility: CLINIC | Age: 54
End: 2024-02-22
Payer: MEDICARE

## 2024-02-22 VITALS
DIASTOLIC BLOOD PRESSURE: 82 MMHG | SYSTOLIC BLOOD PRESSURE: 134 MMHG | WEIGHT: 293 LBS | HEIGHT: 66 IN | BODY MASS INDEX: 47.09 KG/M2

## 2024-02-22 VITALS — DIASTOLIC BLOOD PRESSURE: 85 MMHG | SYSTOLIC BLOOD PRESSURE: 144 MMHG | TEMPERATURE: 97.9 F | HEART RATE: 65 BPM

## 2024-02-22 DIAGNOSIS — G47.33 OSA (OBSTRUCTIVE SLEEP APNEA): Primary | Chronic | ICD-10-CM

## 2024-02-22 DIAGNOSIS — G43.709 CHRONIC MIGRAINE WITHOUT AURA WITHOUT STATUS MIGRAINOSUS, NOT INTRACTABLE: Primary | ICD-10-CM

## 2024-02-22 DIAGNOSIS — G47.19 EXCESSIVE DAYTIME SLEEPINESS: ICD-10-CM

## 2024-02-22 PROCEDURE — 99214 OFFICE O/P EST MOD 30 MIN: CPT | Performed by: INTERNAL MEDICINE

## 2024-02-22 PROCEDURE — 64615 CHEMODENERV MUSC MIGRAINE: CPT | Performed by: PHYSICIAN ASSISTANT

## 2024-02-22 NOTE — PROGRESS NOTES
"Universal Protocol   Consent: Verbal consent obtained. Written consent obtained.  Risks and benefits: risks, benefits and alternatives were discussed  Consent given by: patient  Time out: Immediately prior to procedure a \"time out\" was called to verify the correct patient, procedure, equipment, support staff and site/side marked as required.  Patient understanding: patient states understanding of the procedure being performed  Patient consent: the patient's understanding of the procedure matches consent given  Procedure consent: procedure consent matches procedure scheduled  Relevant documents: relevant documents present and verified  Patient identity confirmed: verbally with patient      Chemodenervation     Date/Time  2/22/2024 9:15 AM     Performed by  Aria Toney PA-C   Authorized by  Aria Toney PA-C     Pre-procedure details      Prepped With: Alcohol     Anesthesia  (see MAR for exact dosages):     Anesthesia method:  None   Procedure details      Position:  Upright   Botox      Botox Type:  Type A    Brand:  Botox    mL's of Botulinum Toxin:  200    Final Concentration per CC:  50 units    Needle Gauge:  30 G 2.5 inch   Procedures      Botox Procedures: chronic headache      Indications: migraines     Injection Location      Head / Face:  L superior cervical paraspinal, R superior cervical paraspinal, L , R , L frontalis, R frontalis, L medial occipitalis, R medial occipitalis, procerus, R temporalis, L temporalis, R superior trapezius and L superior trapezius    L  injection amount:  5 unit(s)    R  injection amount:  5 unit(s)    L lateral frontalis:  5 unit(s)    R lateral frontalis:  5 unit(s)    L medial frontalis:  5 unit(s)    R medial frontalis:  5 unit(s)    L temporalis injection amount:  20 unit(s)    R temporalis injection amount:  20 unit(s)    Procerus injection amount:  5 unit(s)    L medial occipitalis injection amount:  15 unit(s)    R medial " occipitalis injection amount:  15 unit(s)    L superior cervical paraspinal injection amount:  10 unit(s)    R superior cervical paraspinal injection amount:  10 unit(s)    L superior trapezius injection amount:  15 unit(s)    R superior trapezius injection amount:  15 unit(s)   Total Units      Total units used:  200    Total units discarded:  0   Post-procedure details      Chemodenervation:  Chronic migraine    Facial Nerve Location::  Bilateral facial nerve    Patient tolerance of procedure:  Tolerated well, no immediate complications   Comments       20 units left scm  10 units occipitalis  15 units left splenius  All medically necessary

## 2024-02-22 NOTE — ASSESSMENT & PLAN NOTE
Improving, contributed by obstructive sleep apnea.  [place] is 19 but she usually does not doze off or nap during the day.  Other contributing factors include psychiatric medications including Topamax and Trileptal, some subacute withdrawal from cessation of methamphetamine.

## 2024-02-22 NOTE — TELEPHONE ENCOUNTER
LVM for Blanca informing her that her INR is therapeutic, 2.34, therefore, she can continue taking coumadin 5 mg daily.  To repeat her labs next week.

## 2024-02-22 NOTE — ASSESSMENT & PLAN NOTE
History of severe MAG AHI 74.2 with intolerance to CPAP now on auto BiPAP.  Currently compliant with BiPAP and deriving some symptomatic benefit with better sleep quality and more energy during the day.    Compliance data:  2/22/24  97% use over past 30 days  Average use 7 hours 19 minutes  AirCurve 10  Max IPAP 25, min EPAP 12, PS4  Leaks median 0, 95% 1.5  Pressure median 16/12, 95th percentile 18/14  AHI 1.8   [facility]    Supply prescription provided  Continue BiPAP during all hours of sleep  Follow-up in 1 year

## 2024-02-22 NOTE — PROGRESS NOTES
Sleep Medicine Outpatient Follow Up Note   Blanca Mason 53 y.o. female MRN: [id number]  2/22/2024      Reason for Consultation:    Chief Complaint   Patient presents with    Sleep Apnea       Assessment/Plan:    1. MAG (obstructive sleep apnea)  Assessment & Plan:  History of severe MAG AHI 74.2 with intolerance to CPAP now on auto BiPAP.  Currently compliant with BiPAP and deriving some symptomatic benefit with better sleep quality and more energy during the day.    Compliance data:  2/22/24  97% use over past 30 days  Average use 7 hours 19 minutes  AirCurve 10  Max IPAP 25, min EPAP 12, PS4  Leaks median 0, 95% 1.5  Pressure median 16/12, 95th percentile 18/14  AHI 1.8   DME - [facility]    Supply prescription provided  Continue BiPAP during all hours of sleep  Follow-up in 1 year    Orders:  -     Ambulatory Referral to Sleep Medicine  -     PAP DME Resupply/Reorder    2. Excessive daytime sleepiness  Assessment & Plan:  Improving, contributed by obstructive sleep apnea.  [place] is 19 but she usually does not doze off or nap during the day.  Other contributing factors include psychiatric medications including Topamax and Trileptal, some subacute withdrawal from cessation of methamphetamine.              Health Maintenance  Immunization History   Administered Date(s) Administered    COVID-19 PFIZER VACCINE 0.3 ML IM 03/31/2021, 04/21/2021, 12/13/2021, 05/09/2022    COVID-19 Pfizer vac (Edwar-sucrose, gray cap) 12 yr+ IM 05/09/2022    INFLUENZA 08/07/2014, 07/29/2015, 10/10/2018, 11/10/2022, 10/18/2023    Influenza Split 07/29/2015    Influenza, injectable, quadrivalent, preservative free 0.5 mL 10/18/2023    Influenza, recombinant, quadrivalent,injectable, preservative free 11/27/2018, 09/20/2021, 11/10/2022    Tdap 01/26/2009, 07/23/2018    Tuberculin Skin Test-PPD Intradermal 12/10/2018, 01/26/2022        Return in about 1 year (around 2/22/2025).    History of Present Illness   HPI:  Blanca Mason is a 53  "y.o. female who has a past medical history of Sjogren's disease, HTN, GERD, Bipolar disease, Asthma, Yan's esophagus, COPD, prior methamphetamine use, who is presenting for follow-up of MAG    2/22/24 - Recently admitted for 2 months for an overdose.  Occasional panic attacks overnight where she has take off mask and wait for it to resolve, which especially happens when it is hot in the bedroom.  However most nights she is able to tolerate BiPAP and benefit by having more energy during the day.  It's hard to say whether BiPAP is better than CPAP but she is tolerating it better.  She is sometimes tired during the day but she is coming off of methamphetamine use.  She is working on weight loss.  She sleeps between 9-10pm and fall asleep easily.  Hard to stay asleep and wake up around 1am.  She sleeps about 5-6 hours on average.  [place] but she usually does not doze off or nap during the day.      3/9/[id number]-pi with Dr. Calle.  Due to difficulty breathing against the pressure of CPAP, switch to BiPAP.    7/[id number] with Dr. Calle.  Compliance with CPAP shows suboptimal usage      Historical Information   Past Medical History:   Diagnosis Date    Anxiety     Arthritis     oa cassandra knees    Asthma     good control- no medications    Yan's esophagus     Bipolar affective disorder (HCC)     Cannabis abuse with unspecified 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HCC)     Chronic pain disorder     lumbar    COPD (chronic obstructive pulmonary disease) (HCC)     CPAP (continuous positive airway pressure) dependence     DDD (degenerative disc disease), lumbar 04/09/2015    Degenerative disc disease at L5-S1 level     Deliberate self-cutting     9/15/22per pt has not done recently-- does see a therapist and psychiatrist regularly    Depression 09/16/2008    Disease of thyroid gland     hypo    MARTINEZ (dyspnea on exertion)     per pt \"has had this with exertion and not new\"    Drug overdose 10/28/2008    " suicide attempt and again drug overdose 7/2022-- [facility] floor and than transferred to [facility]    Dysphagia     Dyspnea     Edema     BLE    Family history of blood clots     GERD (gastroesophageal reflux disease)     Headache(784.0)     Headache, tension-type     Hemorrhage of rectum and anus 10/02/2017    Formatting of this note might be different from the original.  Added automatically from request for surgery [id number]    High blood pressure     History of COVID-19 12/30/2020    Symptoms started on 12/30/2020 and then got worse.  Today she is feeling a little bit better.  She is a candidate for monoclonal antibody infusion and set for 1/6/21 @ 1pm at [facility]. 01/07/21 - telemedicine -     Hyperlipidemia     Hypertension     Knee pain, bilateral     Especially right    Medial epicondylitis of elbow, left 04/03/2018    Formatting of this note might be different from the original.  Added automatically from request for surgery [id number]    Medial epicondylitis of right elbow 07/17/2023    Medical marijuana use     Memory loss     Migraines     Obesity     Overactive bladder     Primary osteoarthritis of both knees 08/08/2018    Pulmonary emboli (HCC)     Restrictive lung disease 02/01/2017    Last Assessment & Plan:   Formatting of this note might be different from the original.  I reviewed this problem throughout the rest of the note. Please refer to the other assessments for details.    Sjogren's disease (HCC)     Sleep apnea     Stress incontinence     Suicidal ideations     Teeth missing     Tremor     per pt Tremors of Right Leg and both Arms    Visual impairment     Wears glasses      Past Surgical History:   Procedure Laterality Date    BREAST CYST EXCISION Right 1989    CARPAL TUNNEL RELEASE Left     CHOLECYSTECTOMY  05/2003    Laparoscopic    COLONOSCOPY      01/12/2009    DILATION AND CURETTAGE OF UTERUS      ELBOW SURGERY Left     x2. No hardware    ESOPHAGOGASTRODUODENOSCOPY      FOOT SURGERY  Left     Plantar fasciotomy    HERNIA REPAIR      HYSTERECTOMY      laporoscopic, partial    KNEE ARTHROSCOPY Bilateral     OOPHORECTOMY Left 10/2015    PA GASTROCNEMIUS RECESSION Left 02/24/2021    Procedure: RECESSION GASTROC OPEN;  Surgeon: Nomi Yang DPM;  Location:  MAIN OR;  Service: Podiatry    PA RPR UMBILICAL HRNA 5 YRS/> REDUCIBLE N/A 04/24/2019    Procedure: REPAIR HERNIA UMBILICAL LAPAROSCOPIC W/ ROBOTICS;  Surgeon: Anahi Colon MD;  Location: [facility] Main OR;  Service: General    PA SPHINCTEROTOMY ANAL DIVISION SPHINCTER SPX N/A 08/31/2018    Procedure: EUA, LEFT PARTIAL INTERNAL SPHINCTEROTOMY;  Surgeon: iTen Reyes MD;  Location: [facility] MAIN OR;  Service: Colorectal    PA TNOT ELBOW LATERAL/MEDIAL DEBRIDE OPEN TDN RPR Left 09/20/2022    Procedure: RELEASE EPICONDYLAR ELBOW MEDIAL;  Surgeon: Kyree Winkler MD;  Location: [facility] MAIN OR;  Service: Orthopedics    REDUCTION MAMMAPLASTY Bilateral 1999    REPAIR LACERATION Left     left hand-5/18/2009    REPLACEMENT TOTAL KNEE Right     ROTATOR CUFF REPAIR Left     TONSILECTOMY AND ADNOIDECTOMY      US GUIDED MSK PROCEDURE  04/22/2021    VASCULAR SURGERY      VEIN LIGATION Bilateral     WISDOM TOOTH EXTRACTION       Family History   Problem Relation Age of Onset    Kidney cancer Mother     Diabetes Mother     Depression Mother     Stroke Mother     Arthritis Mother     Cancer Mother     Psychiatric Illness Mother     No Known Problems Father     No Known Problems Sister     No Known Problems Maternal Grandmother     No Known Problems Maternal Grandfather     No Known Problems Paternal Grandmother     No Known Problems Paternal Grandfather     Colon cancer Maternal Uncle     Colon cancer Maternal Uncle     Colon cancer Paternal Uncle     Colon cancer Family     Alcohol abuse Sister     Asthma Sister          Meds/Allergies     Current Outpatient Medications:     atoMOXetine (STRATTERA) 40 mg capsule, Take 1 capsule (40 mg total) by mouth daily, Disp:  30 capsule, Rfl: 1    buPROPion (FORFIVO XL) 450 MG 24 hr tablet, Take 1 tablet (450 mg total) by mouth daily, Disp: 30 tablet, Rfl: 1    Cholecalciferol (Vitamin D3) 125 MCG (5000 UT) CAPS, , Disp: , Rfl:     cloNIDine (CATAPRES) 0.2 mg tablet, Take 1 tablet (0.2 mg total) by mouth every 12 (twelve) hours, Disp: 60 tablet, Rfl: 1    cyanocobalamin (VITAMIN B-12) 1000 MCG tablet, Take 1 tablet (1,000 mcg total) by mouth daily, Disp: 30 tablet, Rfl: 1    furosemide (LASIX) 20 mg tablet, Take 1 tablet (20 mg total) by mouth daily, Disp: 30 tablet, Rfl: 1    levothyroxine (Euthyrox) 125 mcg tablet, Take 1 tablet (125 mcg total) by mouth daily in the early morning, Disp: 30 tablet, Rfl: 1    losartan (COZAAR) 100 MG tablet, Take 1 tablet (100 mg total) by mouth daily, Disp: 30 tablet, Rfl: 1    melatonin 3 mg, Take 1 tablet (3 mg total) by mouth daily at bedtime, Disp: 30 tablet, Rfl: 0    naltrexone (REVIA) 50 mg tablet, Take 1 tablet (50 mg total) by mouth daily, Disp: 30 tablet, Rfl: 1    OXcarbazepine (TRILEPTAL) 150 mg tablet, Take 3 tablets (450 mg total) by mouth every 12 (twelve) hours, Disp: 180 tablet, Rfl: 1    OXcarbazepine (TRILEPTAL) 300 mg tablet, Take 300 mg by mouth every 12 (twelve) hours, Disp: , Rfl:     oxybutynin (DITROPAN) 5 mg tablet, Take 1 tablet (5 mg total) by mouth 2 (two) times a day, Disp: 60 tablet, Rfl: 1    [START ON 2/28/2024] paliperidone palmitate ER (INVEGA) 234 mg/1.5 mL IM injection, 1.5 mL (234 mg total) by IM- Deltoid route every 4 weeks Do not start before February 28, 2024., Disp: , Rfl:     pantoprazole (PROTONIX) 40 mg tablet, Take 1 tablet (40 mg total) by mouth daily in the early morning, Disp: 30 tablet, Rfl: 1    saccharomyces boulardii (FLORASTOR) 250 mg capsule, Take 1 capsule (250 mg total) by mouth 2 (two) times a day, Disp: 60 capsule, Rfl: 1    topiramate (TOPAMAX) 200 MG tablet, Take 1 tablet (200 mg total) by mouth 2 (two) times a day, Disp: 60 tablet, Rfl: 1     "warfarin (COUMADIN) 5 mg tablet, Take 1 tablet (5 mg total) by mouth daily, Disp: 90 tablet, Rfl: 0    lurasidone (LATUDA) 40 mg tablet, , Disp: , Rfl:   No current facility-administered medications for this visit.  Allergies   Allergen Reactions    Percocet [Oxycodone-Acetaminophen] Headache     Severe headaches   (denies issues with Tylenol)    Povidone Iodine Rash     Unsure if betadine or gauze post surgical. Got rash on leg.   Has  Had itchy rashes after every surgery prep and IV insertion    Chlorhexidine Rash     Per pt \"able to use the liquid soap--thinkd reaction from the sponges or wipes\"       Vitals: Blood pressure 134/82, height 5' 6\" (1.676 m), weight (!) 142 kg (312 lb), not currently breastfeeding. Body mass index is 50.36 kg/m².      Physical Exam  Vitals and nursing note reviewed.   Constitutional:       General: She is not in acute distress.     Appearance: She is well-developed. She is not ill-appearing, toxic-appearing or diaphoretic.   HENT:      Head: Normocephalic and atraumatic.      Mouth/Throat:      Mouth: Mucous membranes are moist.      Pharynx: Oropharynx is clear. No oropharyngeal exudate.      Comments: Mallampati III  Eyes:      General: No scleral icterus.     Extraocular Movements: Extraocular movements intact.      Conjunctiva/sclera: Conjunctivae normal.   Cardiovascular:      Rate and Rhythm: Normal rate and regular rhythm.   Pulmonary:      Effort: Pulmonary effort is normal. No respiratory distress.      Breath sounds: Normal breath sounds. No stridor.   Abdominal:      Tenderness: There is no guarding.   Musculoskeletal:         General: No swelling.      Cervical back: Normal range of motion and neck supple. No rigidity.      Right lower leg: No edema.      Left lower leg: No edema.   Skin:     General: Skin is warm and dry.      Coloration: Skin is not jaundiced.   Neurological:      General: No focal deficit present.      Mental Status: She is alert and oriented to " "person, place, and time. Mental status is at baseline.   Psychiatric:         Mood and Affect: Mood normal.             Labs:   I have personally reviewed pertinent lab results.    ABG: No results found for: \"PHART\", \"DSW3TMA\", \"PO2ART\", \"IRC7LQX\", \"H8DUEWKM\", \"BEART\", \"SOURCE\",   BNP:   Lab Results   Component Value Date    BNP 40 11/22/2023   ,   CBC:  Lab Results   Component Value Date    WBC 7.52 01/29/2024    HGB 13.8 01/29/2024    HCT 43.4 01/29/2024    MCV 93 01/29/2024     01/29/2024    EOSPCT 2 01/29/2024    EOSABS 0.11 01/29/2024    NEUTOPHILPCT 64 01/29/2024    LYMPHOPCT 22 01/29/2024   ,   CMP:   Lab Results   Component Value Date    SODIUM 138 01/29/2024    K 4.0 01/29/2024     01/29/2024    CO2 26 01/29/2024    BUN 13 01/29/2024    CREATININE 0.62 01/29/2024    CALCIUM 8.6 01/29/2024    AST 13 12/02/2023    ALT 13 12/02/2023    ALKPHOS 55 12/02/2023    EGFR 104 01/29/2024   ,   PT/INR:   Lab Results   Component Value Date    PT 12.8 01/04/2021    INR 2.34 (H) 02/22/2024   ,   Ferrtin: No components found for: \"FERRTIN\",  Magensium: No results found for: \"MAGNESIUM\",      Imaging and other studies: I have personally reviewed pertinent reports.   and I have personally reviewed pertinent films in PACS      Sleep Study:  Diagnostic: AHI 74.2  Titration 6/13/2022: AHI 10.5 and 16 PLMs with PLM index of 2.6. CPAP auto set at 12-20 cmH2O    Compliance data:  2/22/24  97% use over past 30 days  Average use 7 hours 19 minutes  AirCurve 10  Max IPAP 25, min EPAP 12, PS4  Leaks median 0, 95% 1.5  Pressure median 16/12, 95th percentile 18/14  AHI 1.8   CHASE Jeffery MD  Pulmonary, Critical Care and Sleep Medicine  [facility]     Portions of the record may have been created with voice recognition software. Occasional wrong word or \"sound a like\" substitutions may have occurred due to the inherent limitations of voice recognition software. " Please read the chart carefully and recognize, using context, where substitutions have occurred.

## 2024-02-22 NOTE — PATIENT INSTRUCTIONS
[facility] Sleep Medicine Nursing Support:  When: Monday through Friday 7A-5PM except holidays  Where: Our direct line is [phone].    If you are having problems with equipment or need a mask fitting you may want to reach out to your durable medical equipment company first    IMPORTANT CONTACT PHONE NUMBERS:  Both the Sleep Medicine and Pulmonary Department manages CPAP/BIPAPs.  If depends on where you are typically seen on who to contact  [facility] Sleep Medicine Nursing Support: [phone] (if you are seen in a [facility] Sleep Medicine office)  [facility] Pulmonary: [phone] (if you are seen in a [facility] Pulmonary office)  [facility] Medical[facility] - [phone] ([place]), [phone] ([place])  If you are unsure, please send me a message on [employer] and I can help    CPAP/BIPAP MAINTENANCE AND MANAGEMENT  1.  Continue use of CPAP/BIPAP equipment nightly - use any time you are laying down to rest, watch TV, etc to increase use and in case you fall asleep to prevent falling asleep without it  2.  If air is too hot, turn down the tubing heating setting  3.  If excessive dry mouth in the morning, turn up humidifier setting and be sure to only use distilled water in your humidifier.  You can also turn down the tubing heating setting  4.  Change your filter regularly and wash the non-disposable filter  5.  Continue to clean your equipment, as discussed, using mild soap and water.  You can use unscented baby wipe on the mask.  6.  Contact the Sleep Disorders Center with any questions or concerns prior to your next visit, as needed  7.  Schedule visit for follow-up in 1 year.  You should see your sleep physician at least once a year.      HOW TO CLEAN YOUR AUTO CPAP MACHINE  Mask: Clean the cushion of your mask daily. Dish soap and warm water.  (Usually eligible for mask cushions every 3 months)  2.  Headgear: Once a week. I find when you wash it daily it eats the material of the Velcro  faster.  (Usually eligible for new headgear every 6 months)  3.  Heated Tubing: Clean the tube every 3-7 days. One drop of dish soap run warm water through the tube then hang it over your shower curtain and let it drip dry. (Usually eligible every 3 months)  4.  Humidifier: Rinse out every 1-3 days. Dish soap warm water or , top shelf. (eligible every 6 months) **DISTILLED WATER ONLY**  5. Filters:  Dark blue (reusable for 6 months)- Rinse under warm water (no soap) sit and drip dry every 2-3 weeks.  (eligible for a new one every 6 months)  Light Blue (disposable) throw away every 2-4 weeks depending on how dirty it looks. (eligible for 6 every 3 months)    Medicare/Private Insurance Requirements with CPAP  Your insurance requires a face-to-face follow up visit within a 31-90 day period after starting CPAP.  Your insurance requires compliance with CPAP, which is at least 4 hours per night for 70% of the time. This must be done over a 30 day period and must occur within the initial 31-90 day period after starting CPAP.  Your insurance also requires at least yearly follow ups to continue to pay for CPAP supplies.  Check with your insurance and durable medical equipment company regarding supply replacements.     OTHER SLEEP MEDICINE ISSUES  If you are having a true emergency please call 911.  In the event that the line is busy or it is after hours please leave a voice message and we will return your call.  Please speak clearly, leaving your full name, birth date, best number to reach you and the reason for your call.   Medication refills: We will need the name of the medication, the dosage, the ordering provider, whether you get a 30 or 90 day refill, and the pharmacy name and address.  Medications will be ordered by the provider only.  Nurses cannot call in prescriptions.  Please allow 7 days for medication refills.  Physician requested updates: If your provider requested that you call with an update after  starting medication, please be ready to provide us the medication and dosage, what time you take your medication, the time you attempt to fall asleep, time you fall asleep, when you wake up, and what time you get out of bed.  Sleep Study Results: We will contact you with sleep study results and/or next steps after the physician has reviewed your testing.  Usually one of our nurses will call to talk about the results within 1-2 weeks of testing.

## 2024-02-23 ENCOUNTER — TELEPHONE (OUTPATIENT)
Dept: SLEEP CENTER | Facility: CLINIC | Age: 54
End: 2024-02-23

## 2024-02-23 LAB

## 2024-02-27 ENCOUNTER — OFFICE VISIT (OUTPATIENT)
Dept: PHYSICAL THERAPY | Age: 54
End: 2024-02-27
Payer: MEDICARE

## 2024-02-27 DIAGNOSIS — I89.0 LYMPHEDEMA: Primary | ICD-10-CM

## 2024-02-27 PROCEDURE — 97140 MANUAL THERAPY 1/> REGIONS: CPT

## 2024-02-27 PROCEDURE — 97110 THERAPEUTIC EXERCISES: CPT

## 2024-02-27 NOTE — PROGRESS NOTES
"Daily Note     Today's date: 2024  Patient name: Blanca Mason  : 1970  MRN: [id number]  Referring provider: Shameka Phillips CRNP  Dx:   Encounter Diagnosis     ICD-10-CM    1. Lymphedema  I89.0                      Subjective: Pt bring current knee highs in to PT not gradient pressure and improper fit too tight to pt.  Pt requires medical grade compression below the knee with wide band to avoid ballooning effect of both le's below the knee.      Objective: See treatment diary below      Assessment: Tolerated treatment well. Patient would benefit from continued PT      Plan: Continue per plan of care.      Precautions: Allergies  Reviewed by Sarah Elam RN at 10:28 AM   Severity Reactions Comments   Percocet [oxycodone-acetaminophen] High Headache Severe headaches   (denies issues with Tylenol)   Povidone Iodine Medium Rash Unsure if betadine or gauze post surgical. Got rash on leg. Has  Had itchy rashes after every surgery prep and IV insertion   Chlorhexidine Low Rash Per pt \"able to use the liquid soap--thinkd reaction from the sponges or wipes\"         Manuals 24            I eval             Mld massage and soft tissue mobilization both le's   45 min man           Fit for compression knee highs  Pt to bring in her knee highs for assessment by PT next visit Measurements taken today for possible compression wraps versus knee highs part of manual             Tubigrip size g given for both le's to be worn daily            Neuro Re-Ed             Nu step   15 min r 3           Le lymphedema exer  10 reps each                                                                             Ther Ex                                                                                                                     Ther Activity                                       Gait Training                                       Modalities             Bilateral le compression pump with le elevation 40 " mmHG   nv

## 2024-02-28 ENCOUNTER — RA CDI HCC (OUTPATIENT)
Dept: OTHER | Facility: HOSPITAL | Age: 54
End: 2024-02-28

## 2024-02-28 ENCOUNTER — APPOINTMENT (OUTPATIENT)
Dept: LAB | Facility: CLINIC | Age: 54
End: 2024-02-28
Payer: MEDICARE

## 2024-02-28 ENCOUNTER — TELEPHONE (OUTPATIENT)
Dept: HEMATOLOGY ONCOLOGY | Facility: CLINIC | Age: 54
End: 2024-02-28

## 2024-02-28 ENCOUNTER — OFFICE VISIT (OUTPATIENT)
Dept: PHYSICAL THERAPY | Age: 54
End: 2024-02-28
Payer: MEDICARE

## 2024-02-28 DIAGNOSIS — I89.0 LYMPHEDEMA: Primary | ICD-10-CM

## 2024-02-28 LAB
DME PARACHUTE DELIVERY DATE ACTUAL: NORMAL
DME PARACHUTE DELIVERY DATE REQUESTED: NORMAL
DME PARACHUTE ITEM DESCRIPTION: NORMAL
DME PARACHUTE ORDER STATUS: NORMAL
DME PARACHUTE SUPPLIER NAME: NORMAL
DME PARACHUTE SUPPLIER PHONE: NORMAL

## 2024-02-28 PROCEDURE — 97110 THERAPEUTIC EXERCISES: CPT

## 2024-02-28 PROCEDURE — 97016 VASOPNEUMATIC DEVICE THERAPY: CPT

## 2024-02-28 NOTE — PROGRESS NOTES
"Daily Note     Today's date: 2024  Patient name: Blanca Mason  : 1970  MRN: [id number]  Referring provider: Shameka Phillips CRNP  Dx:   Encounter Diagnosis     ICD-10-CM    1. Lymphedema  I89.0                      Subjective: Measure for garments in the future.      Objective: See treatment diary below      Assessment: Tolerated treatment well. Patient would benefit from continued PT      Plan: Continue per plan of care.      Precautions: Allergies  Reviewed by Sarah Elam RN at 10:28 AM   Severity Reactions Comments   Percocet [oxycodone-acetaminophen] High Headache Severe headaches   (denies issues with Tylenol)   Povidone Iodine Medium Rash Unsure if betadine or gauze post surgical. Got rash on leg. Has  Had itchy rashes after every surgery prep and IV insertion   Chlorhexidine Low Rash Per pt \"able to use the liquid soap--thinkd reaction from the sponges or wipes\"         Manuals 24           I eval             Mld massage and soft tissue mobilization both le's   45 min man           Fit for compression knee highs  Pt to bring in her knee highs for assessment by PT next visit Measurements taken today for possible compression wraps versus knee highs part of manual             Tubigrip size g given for both le's to be worn daily            Neuro Re-Ed             Nu step   15 min r 3 15 min          Le lymphedema exer  10 reps each  15 min                                                                           Ther Ex                                                                                                                     Ther Activity                                       Gait Training                                       Modalities             Bilateral le compression pump with le elevation 40 mmHG   nv B/l LE 40mmHg 30 min elevated                              "

## 2024-02-28 NOTE — TELEPHONE ENCOUNTER
Appointment Change  Cancel, Reschedule, Change to Virtual      Who are you speaking with? Patient   If it is not the patient, is the caller listed on the communication consent form? Yes   Which provider is the appointment scheduled with? JHONATAN Lee   When was the original appointment scheduled?    Please list date and time 2/28/24   At which location is the appointment scheduled to take place? [place]   Was the appointment rescheduled?     Was the appointment changed from an in person visit to a virtual visit?    If so, please list the details of the change. 4/3/24   What is the reason for the appointment change? Not feeling well

## 2024-02-28 NOTE — TELEPHONE ENCOUNTER
Spoke to pt confirming new appt scheduled for 4/3 at 10;00 a.m with JHONATAN Ramirez in the [place] office. Also informed pt to have PT/INR done either today or tomorrow. Pt verbalized that she will go today.

## 2024-02-29 ENCOUNTER — TELEPHONE (OUTPATIENT)
Dept: HEMATOLOGY ONCOLOGY | Facility: CLINIC | Age: 54
End: 2024-02-29

## 2024-02-29 ENCOUNTER — OFFICE VISIT (OUTPATIENT)
Dept: NEUROLOGY | Facility: CLINIC | Age: 54
End: 2024-02-29
Payer: MEDICARE

## 2024-02-29 VITALS
HEART RATE: 62 BPM | BODY MASS INDEX: 47.09 KG/M2 | SYSTOLIC BLOOD PRESSURE: 142 MMHG | DIASTOLIC BLOOD PRESSURE: 98 MMHG | RESPIRATION RATE: 18 BRPM | OXYGEN SATURATION: 99 % | TEMPERATURE: 97.5 F | WEIGHT: 293 LBS | HEIGHT: 66 IN

## 2024-02-29 DIAGNOSIS — G24.01 DYSKINESIA, DRUG-INDUCED: Primary | ICD-10-CM

## 2024-02-29 DIAGNOSIS — R25.1 TREMORS OF NERVOUS SYSTEM: ICD-10-CM

## 2024-02-29 PROCEDURE — 99213 OFFICE O/P EST LOW 20 MIN: CPT | Performed by: PSYCHIATRY & NEUROLOGY

## 2024-02-29 NOTE — TELEPHONE ENCOUNTER
Spoke with Blanca, reviewed her INR is 2.7.  She can continue taking Coumadin 5 mg daily.  We will recheck next Wednesday.  Patient voiced understanding.

## 2024-02-29 NOTE — PROGRESS NOTES
Patient ID: Blanca Mason is a 53 y.o. female.    Assessment/Plan:    Dyskinesia, drug-induced  Blanca Mason is a very pleasant 53 y.o. female with a complex history including methamphetamine abuse who presents for follow up of generalized dyskinesias affecting both upper and lower extremities.     To review, symptom onset believed to be triggered after patient started a psychiatric medication but does not recall which one. A confounding factor is her history of methamphetamine abuse which she was actively using at onset. Tremor persisted despite stopping its use.  At the time patient was also on Depakote and carbamazepine for her psychiatric comorbidities, which can cause tremors.  I had talked to her psychiatrist over the phone who was very pleasant and already decreasing the Depakote and increasing her Trileptal.    Today she reports that her tremors are doing significantly better.  On physical exam they are essentially gone.  She reports having a side-to-side tremor of her lower jaw when she opens her mouth ever since starting Invega however it is not significant enough to stop the medication as she feels this is helping her significantly.  Patient continues to abstain from methamphetamine use since September 2, 2023.  She has an upcoming appointment with weight management which I highly encourage.  She will be returning to work on the 12th with [employer].    At this time, I am happy to hear that patient is doing very well.  It is likely that her tremors were drug-induced given that upon removing offending agent these have resolved.  At this time I we will make no changes to her medical management and defer to her psychiatrist.  I will follow-up with her 1 more time in 6 months to ensure stability.       Diagnoses and all orders for this visit:    Tremors of nervous system    Dyskinesia, drug-induced       Subjective:      Blanca Mason is a very pleasant 53 y.o. female with a complex history including  methamphetamine abuse who presents for follow up of generalized dyskinesias affecting both upper and lower extremities.     To review, symptom onset believed to be triggered after patient started a psychiatric medication but does not recall which one. A confounding factor is her history of methamphetamine abuse which she was actively using at onset. Tremor persisted despite stopping its use.      Tremor thought to be residual from methamphetamine abuse as patient had just recently stopped using versus side effect from Depakote and/or carbamazepine.  Patient referred to PT for help with compensatory maneuvers.  I had talked to her psychiatrist over the phone who was very pleasant and already decreasing the Depakote and increasing her Trileptal.    Workup:  MRI NeuroQuant (2/14/24): No acute intracranial pathology. Minimal nonspecific white matter changes likely due to chronic microangiopathy and/or migraines.     NeuroQuant analysis was performed: Normal study; Does not support neurodegeneration.       Interval history:  Tremor of upper and lower extremitites have significantly improved   Took off depakote, switch to oxcarbazepine.  has a strange twitch in the mouth since starting invega   Was just in the hospital in [place] after overdosing 3 times. Two times with muscle relaxant and last time with trazodone.  Now has a mouth twitch after being put on invega   Psychiatrist Dr. Haile  Side to side jaw movement happens multiple times a day   When she has her mouth open it is more present.   Still clean since September 2  Has upcoming appointment with weight management   Returning to work on the 12th with [employer]      The following portions of the patient's history were reviewed and updated as appropriate: allergies, current medications, past family history, past medical history, past social history, past surgical history, and problem list and review of systems.         Objective:    Blood pressure 142/98, pulse 62,  "temperature 97.5 °F (36.4 °C), temperature source Temporal, resp. rate 18, height 5' 6\" (1.676 m), weight (!) 142 kg (312 lb 8 oz), SpO2 99%, not currently breastfeeding.    Physical Exam  Constitutional:       General: She is awake.   Eyes:      General: Lids are normal.      Extraocular Movements: Extraocular movements intact.   Neurological:      Mental Status: She is alert.      Motor: Motor strength is normal.  Psychiatric:         Speech: Speech normal.         Neurological Exam  Mental Status  Awake and alert. Oriented to person, place and time. Speech is normal. Language is fluent with no aphasia. Attention and concentration are normal.    Cranial Nerves  CN III, IV, VI: Extraocular movements intact bilaterally. Normal lids and orbits bilaterally.  CN VII: Full and symmetric facial movement.  CN VIII: Hearing is normal.  CN XII: Tongue midline without atrophy or fasciculations.    Motor  Increased muscle bulk throughout. Strength is 5/5 throughout all four extremities.  Caught a glimpse of tremor of the jaw which was very brief after opening her mouth .    Sensory  Normal proprioception.    Coordination    Normal coordination.    Gait  Casual gait is normal including stance, stride, and arm swing.        ROS:    Review of Systems    Review of Systems   Constitutional:  Negative for appetite change, fatigue and fever.   HENT: Negative.  Negative for hearing loss, tinnitus, trouble swallowing and voice change.    Eyes: Negative.  Negative for photophobia, pain and visual disturbance.   Respiratory: Negative.  Negative for shortness of breath.    Cardiovascular: Negative.  Negative for palpitations.   Gastrointestinal: Negative.  Negative for nausea and vomiting.   Endocrine: Negative.  Negative for cold intolerance.   Genitourinary: Negative.  Negative for dysuria, frequency and urgency.   Musculoskeletal:  Negative for back pain, gait problem, myalgias, neck pain and neck stiffness.   Skin: Negative.  " Negative for rash.   Allergic/Immunologic: Negative.    Neurological:  Positive for tremors (new movement on her mouth). Negative for dizziness, seizures, syncope, facial asymmetry, speech difficulty, weakness, light-headedness, numbness and headaches.   Hematological: Negative.  Does not bruise/bleed easily.   Psychiatric/Behavioral:  Positive for confusion (cont the same). Negative for hallucinations and sleep disturbance.    All other systems reviewed and are negative.

## 2024-02-29 NOTE — PROGRESS NOTES
Patient ID: Blanca Mason is a 53 y.o. female.    Assessment/Plan:    No problem-specific Assessment & Plan notes found for this encounter.       {Assess/PlanSmartLinks:82881}       Subjective:    Patient is a ***. I last saw them on ***.       To review initial encounter: ***                Workup:    ***              Interval history:        ***        The following portions of the patient's history were reviewed and updated as appropriate: allergies, current medications, past family history, past medical history, past social history, past surgical history, and problem list and review of systems.         Objective:    Weight (!) 142 kg (312 lb 8 oz), not currently breastfeeding.    Physical Exam    Neurological Exam      ROS:    Review of Systems   Constitutional:  Negative for appetite change, fatigue and fever.   HENT: Negative.  Negative for hearing loss, tinnitus, trouble swallowing and voice change.    Eyes: Negative.  Negative for photophobia, pain and visual disturbance.   Respiratory: Negative.  Negative for shortness of breath.    Cardiovascular: Negative.  Negative for palpitations.   Gastrointestinal: Negative.  Negative for nausea and vomiting.   Endocrine: Negative.  Negative for cold intolerance.   Genitourinary: Negative.  Negative for dysuria, frequency and urgency.   Musculoskeletal:  Negative for back pain, gait problem, myalgias, neck pain and neck stiffness.   Skin: Negative.  Negative for rash.   Allergic/Immunologic: Negative.    Neurological:  Positive for tremors (new movement on her mouth). Negative for dizziness, seizures, syncope, facial asymmetry, speech difficulty, weakness, light-headedness, numbness and headaches.   Hematological: Negative.  Does not bruise/bleed easily.   Psychiatric/Behavioral:  Positive for confusion (cont the same). Negative for hallucinations and sleep disturbance.    All other systems reviewed and are negative.

## 2024-03-04 DIAGNOSIS — F25.0 SCHIZOAFFECTIVE DISORDER, BIPOLAR TYPE (HCC): Chronic | ICD-10-CM

## 2024-03-05 ENCOUNTER — CONSULT (OUTPATIENT)
Dept: FAMILY MEDICINE CLINIC | Facility: CLINIC | Age: 54
End: 2024-03-05
Payer: MEDICARE

## 2024-03-05 VITALS
DIASTOLIC BLOOD PRESSURE: 82 MMHG | OXYGEN SATURATION: 100 % | WEIGHT: 293 LBS | BODY MASS INDEX: 47.09 KG/M2 | TEMPERATURE: 97.6 F | HEIGHT: 66 IN | SYSTOLIC BLOOD PRESSURE: 138 MMHG | HEART RATE: 81 BPM

## 2024-03-05 DIAGNOSIS — R41.89 COGNITIVE IMPAIRMENT: ICD-10-CM

## 2024-03-05 DIAGNOSIS — Z01.818 PREOPERATIVE EXAMINATION: Primary | ICD-10-CM

## 2024-03-05 DIAGNOSIS — R25.1 TREMORS OF NERVOUS SYSTEM: ICD-10-CM

## 2024-03-05 PROCEDURE — 99214 OFFICE O/P EST MOD 30 MIN: CPT

## 2024-03-05 RX ORDER — LANOLIN ALCOHOL/MO/W.PET/CERES
3 CREAM (GRAM) TOPICAL
Qty: 30 TABLET | Refills: 0 | OUTPATIENT
Start: 2024-03-05 | End: 2024-05-04

## 2024-03-05 NOTE — PROGRESS NOTES
[facility] PRE-OPERATIVE EVALUATION  [facility] PHYSICIAN GROUP - [facility]    NAME: Blanca Mason  AGE: 53 y.o. SEX: female  : 1970     DATE: 3/5/2024    [facility] Pre-Operative Evaluation      Chief Complaint: Pre-operative Evaluation     Surgery: MRI with sedation   Anticipated Date of Surgery: 24  Referring Provider: Laure Madrid        History of Present Illness:     Blanca Mason is a 53 y.o. female who presents to the office today for a preoperative consultation at the request of surgeon, Laure Madrid   , who plans on performing  MRI with sedation  on 24. Planned anesthesia is IV sedation. Patient has a bleeding risk of: recent abnormal bleeding (pulmonary embolism). Patient does not have objections to receiving blood products if needed. Current anti-platelet/anti-coagulation medications that the patient is prescribed includes: Warfarin (Coumadin or Jantoven).      Assessment of Chronic Conditions:   - COPD: albuterol  - Hypertension: furosemide, losartan, clonidine     Assessment of Cardiac Risk:  Denies unstable or severe angina or MI in the last 6 weeks or history of stent placement in the last year   Denies decompensated heart failure (e.g. New onset heart failure, NYHA functional class IV heart failure, or worsening existing heart failure)  Denies significant arrhythmias such as high grade AV block, symptomatic ventricular arrhythmia, newly recognized ventricular tachycardia, supraventricular tachycardia with resting heart rate >100, or symptomatic bradycardia  Denies severe heart valve disease including aortic stenosis or symptomatic mitral stenosis     Exercise Capacity:  Able to walk 4 blocks without symptoms?: no  Able to walk 2 flights without symptoms?: no     Prior Anesthesia Reactions: No     Personal history of venous thromboembolic disease? Yes, pulmonary embolism on coumadin    History of steroid use for >2  weeks within last year? No         Review of Systems:     Review of Systems   Constitutional:  Negative for activity change, chills, fatigue and fever.   HENT:  Negative for congestion, ear pain, rhinorrhea, sore throat and trouble swallowing.    Eyes:  Negative for pain and visual disturbance.   Respiratory:  Negative for cough, chest tightness and shortness of breath.    Cardiovascular:  Negative for chest pain, palpitations and leg swelling.   Gastrointestinal:  Negative for abdominal pain, constipation, diarrhea, nausea and vomiting.   Genitourinary:  Negative for difficulty urinating, dysuria, hematuria and urgency.   Musculoskeletal:  Negative for arthralgias and back pain.   Skin:  Negative for color change and rash.   Neurological:  Negative for dizziness, seizures, syncope and headaches.   Psychiatric/Behavioral:  Negative for dysphoric mood. The patient is not nervous/anxious.    All other systems reviewed and are negative.      Current Problem List:     Patient Active Problem List   Diagnosis    Yan's esophagus determined by biopsy    Benign essential hypertension    Schizoaffective disorder, bipolar type ([facility])    Chronic low back pain    Edema    Family history of renal cancer    Hypothyroidism    MAG (obstructive sleep apnea)    Overactive bladder    Urinary incontinence    Major depressive disorder    Sjogren's syndrome ([facility])    COPD (chronic obstructive pulmonary disease) ([facility])    Mixed hyperlipidemia    BMI 45.0-49.9, adult ([facility])    Memory difficulties    History of COVID-19    Medical clearance for psychiatric admission    Chronic tension-type headache, intractable    Potential for cognitive impairment    Mood disorder ([facility])    Substance abuse ([facility])    Cognitive impairment    Contusion of elbow    Diarrhea    Ecchymosis    Anxiety    Borderline personality disorder ([facility])    Platelets decreased ([facility])    Knee pain, right    Suicidal deliberate poisoning ([facility])    Intentional self-harm by  "unspecified sharp object, sequela (HCC)    Chronic eczematous otitis externa of both ears    Chronic migraine without aura without status migrainosus, not intractable    Bilateral leg edema    Cervicalgia    History of pulmonary embolism    Elevated troponin    Tremors of nervous system    Intentional overdose (HCC)    Polysubstance abuse (HCC)    Restless leg syndrome    Acute saddle pulmonary embolism (HCC)    Acute deep vein thrombosis of lower leg, left (HCC)    Lymphedema    Toxic encephalopathy    Grief    History of DVT (deep vein thrombosis)    Ingrown toenail    Excessive daytime sleepiness       Allergies:     Allergies   Allergen Reactions    Percocet [Oxycodone-Acetaminophen] Headache     Severe headaches   (denies issues with Tylenol)    Povidone Iodine Rash     Unsure if betadine or gauze post surgical. Got rash on leg.   Has  Had itchy rashes after every surgery prep and IV insertion    Chlorhexidine Rash     Per pt \"able to use the liquid soap--thinkd reaction from the sponges or wipes\"       Current Medications:       Current Outpatient Medications:     atoMOXetine (STRATTERA) 40 mg capsule, Take 1 capsule (40 mg total) by mouth daily, Disp: 30 capsule, Rfl: 1    buPROPion (FORFIVO XL) 450 MG 24 hr tablet, Take 1 tablet (450 mg total) by mouth daily, Disp: 30 tablet, Rfl: 1    Cholecalciferol (Vitamin D3) 125 MCG (5000 UT) CAPS, , Disp: , Rfl:     cloNIDine (CATAPRES) 0.2 mg tablet, Take 1 tablet (0.2 mg total) by mouth every 12 (twelve) hours, Disp: 60 tablet, Rfl: 1    cyanocobalamin (VITAMIN B-12) 1000 MCG tablet, Take 1 tablet (1,000 mcg total) by mouth daily, Disp: 30 tablet, Rfl: 1    furosemide (LASIX) 20 mg tablet, Take 1 tablet (20 mg total) by mouth daily, Disp: 30 tablet, Rfl: 1    levothyroxine (Euthyrox) 125 mcg tablet, Take 1 tablet (125 mcg total) by mouth daily in the early morning, Disp: 30 tablet, Rfl: 1    losartan (COZAAR) 100 MG tablet, Take 1 tablet (100 mg total) by mouth " daily, Disp: 30 tablet, Rfl: 1    lurasidone (LATUDA) 40 mg tablet, , Disp: , Rfl:     melatonin 3 mg, Take 1 tablet (3 mg total) by mouth daily at bedtime, Disp: 30 tablet, Rfl: 0    naltrexone (REVIA) 50 mg tablet, Take 1 tablet (50 mg total) by mouth daily, Disp: 30 tablet, Rfl: 1    OXcarbazepine (TRILEPTAL) 150 mg tablet, Take 3 tablets (450 mg total) by mouth every 12 (twelve) hours, Disp: 180 tablet, Rfl: 1    OXcarbazepine (TRILEPTAL) 300 mg tablet, Take 300 mg by mouth every 12 (twelve) hours, Disp: , Rfl:     oxybutynin (DITROPAN) 5 mg tablet, Take 1 tablet (5 mg total) by mouth 2 (two) times a day, Disp: 60 tablet, Rfl: 1    paliperidone palmitate ER (INVEGA) 234 mg/1.5 mL IM injection, 1.5 mL (234 mg total) by IM- Deltoid route every 4 weeks Do not start before February 28, 2024., Disp: , Rfl:     pantoprazole (PROTONIX) 40 mg tablet, Take 1 tablet (40 mg total) by mouth daily in the early morning, Disp: 30 tablet, Rfl: 1    saccharomyces boulardii (FLORASTOR) 250 mg capsule, Take 1 capsule (250 mg total) by mouth 2 (two) times a day, Disp: 60 capsule, Rfl: 1    topiramate (TOPAMAX) 200 MG tablet, Take 1 tablet (200 mg total) by mouth 2 (two) times a day, Disp: 60 tablet, Rfl: 1    warfarin (COUMADIN) 5 mg tablet, Take 1 tablet (5 mg total) by mouth daily, Disp: 90 tablet, Rfl: 0    Past Medical History:       Past Medical History:   Diagnosis Date    Anxiety     Arthritis     oa cassandra knees    Asthma     good control- no medications    Yan's esophagus     Bipolar affective disorder (HCC)     Cannabis abuse with unspecified 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HCC)     Chronic pain disorder     lumbar    COPD (chronic obstructive pulmonary disease) (HCC)     CPAP (continuous positive airway pressure) dependence     DDD (degenerative disc disease), lumbar 04/09/2015    Degenerative disc disease at L5-S1 level     Deliberate self-cutting     9/15/22per pt has not done recently-- does see a therapist and  "psychiatrist regularly    Depression 09/16/2008    Disease of thyroid gland     hypo    MARTINEZ (dyspnea on exertion)     per pt \"has had this with exertion and not new\"    Drug overdose 10/28/2008    suicide attempt and again drug overdose 7/2022--[facility]-Medical floor and than transferred to [facility]    Dysphagia     Dyspnea     Edema     BLE    Family history of blood clots     GERD (gastroesophageal reflux disease)     Headache(784.0)     Headache, tension-type     Hemorrhage of rectum and anus 10/02/2017    Formatting of this note might be different from the original.  Added automatically from request for surgery [id number]    High blood pressure     History of COVID-19 12/30/2020    Symptoms started on 12/30/2020 and then got worse.  Today she is feeling a little bit better.  She is a candidate for monoclonal antibody infusion and set for 1/6/21 @ 1pm at [facility]. 01/07/21 - telemedicine -     Hyperlipidemia     Hypertension     Knee pain, bilateral     Especially right    Medial epicondylitis of elbow, left 04/03/2018    Formatting of this note might be different from the original.  Added automatically from request for surgery [id number]    Medial epicondylitis of right elbow 07/17/2023    Medical marijuana use     Memory loss     Migraines     Obesity     Overactive bladder     Primary osteoarthritis of both knees 08/08/2018    Pulmonary emboli (HCC)     Restrictive lung disease 02/01/2017    Last Assessment & Plan:   Formatting of this note might be different from the original.  I reviewed this problem throughout the rest of the note. Please refer to the other assessments for details.    Sjogren's disease (HCC)     Sleep apnea     Stress incontinence     Suicidal ideations     Teeth missing     Tremor     per pt Tremors of Right Leg and both Arms    Visual impairment     Wears glasses         Past Surgical History:   Procedure Laterality Date    BREAST CYST EXCISION Right 1989    CARPAL TUNNEL RELEASE Left     " CHOLECYSTECTOMY  05/2003    Laparoscopic    COLONOSCOPY      01/12/2009    DILATION AND CURETTAGE OF UTERUS      ELBOW SURGERY Left     x2. No hardware    ESOPHAGOGASTRODUODENOSCOPY      FOOT SURGERY Left     Plantar fasciotomy    HERNIA REPAIR      HYSTERECTOMY      laporoscopic, partial    KNEE ARTHROSCOPY Bilateral     OOPHORECTOMY Left 10/2015    MS GASTROCNEMIUS RECESSION Left 02/24/2021    Procedure: RECESSION GASTROC OPEN;  Surgeon: Nomi Yang DPM;  Location:  MAIN OR;  Service: Podiatry    MS RPR UMBILICAL HRNA 5 YRS/> REDUCIBLE N/A 04/24/2019    Procedure: REPAIR HERNIA UMBILICAL LAPAROSCOPIC W/ ROBOTICS;  Surgeon: Anahi Colon MD;  Location: [facility] Main OR;  Service: General    MS SPHINCTEROTOMY ANAL DIVISION SPHINCTER SPX N/A 08/31/2018    Procedure: EUA, LEFT PARTIAL INTERNAL SPHINCTEROTOMY;  Surgeon: Tien Reyes MD;  Location:  MAIN OR;  Service: Colorectal    MS TNOT ELBOW LATERAL/MEDIAL DEBRIDE OPEN TDN RPR Left 09/20/2022    Procedure: RELEASE EPICONDYLAR ELBOW MEDIAL;  Surgeon: Kyree Winkler MD;  [facility];  Service: Orthopedics    REDUCTION MAMMAPLASTY Bilateral 1999    REPAIR LACERATION Left     left hand-5/18/2009    REPLACEMENT TOTAL KNEE Right     ROTATOR CUFF REPAIR Left     TONSILECTOMY AND ADNOIDECTOMY      US GUIDED MSK PROCEDURE  04/22/2021    VASCULAR SURGERY      VEIN LIGATION Bilateral     WISDOM TOOTH EXTRACTION          Family History   Problem Relation Age of Onset    Kidney cancer Mother     Diabetes Mother     Depression Mother     Stroke Mother     Arthritis Mother     Cancer Mother     Psychiatric Illness Mother     No Known Problems Father     No Known Problems Sister     No Known Problems Maternal Grandmother     No Known Problems Maternal Grandfather     No Known Problems Paternal Grandmother     No Known Problems Paternal Grandfather     Colon cancer Maternal Uncle     Colon cancer Maternal Uncle     Colon cancer Paternal Uncle     Colon cancer  Family     Alcohol abuse Sister     Asthma Sister         Social History     Socioeconomic History    Marital status: Single     Spouse name: Not on file    Number of children: Not on file    Years of education: Not on file    Highest education level: Not on file   Occupational History    Not on file   Tobacco Use    Smoking status: Former     Current packs/day: 0.00     Average packs/day: 2.0 packs/day for 33.0 years (66.0 ttl pk-yrs)     Types: Cigarettes     Start date: 1985     Quit date: 2018     Years since quittin.1    Smokeless tobacco: Never    Tobacco comments:     Started at age 16.   Vaping Use    Vaping status: Never Used   Substance and Sexual Activity    Alcohol use: Not Currently     Comment: Recovering alcoholic    Drug use: Yes     Types: Marijuana, Methamphetamines     Comment: 2 months off meth    Sexual activity: Not Currently     Partners: Male     Comment: defer   Other Topics Concern    Not on file   Social History Narrative    Not on file     Social Determinants of Health     Financial Resource Strain: Low Risk  (2024)    Overall Financial Resource Strain (CARDIA)     Difficulty of Paying Living Expenses: Not hard at all   Food Insecurity: No Food Insecurity (2023)    Hunger Vital Sign     Worried About Running Out of Food in the Last Year: Never true     Ran Out of Food in the Last Year: Never true   Transportation Needs: No Transportation Needs (2024)    PRAPARE - Transportation     Lack of Transportation (Medical): No     Lack of Transportation (Non-Medical): No   Physical Activity: Not on file   Stress: Not on file   Social Connections: Not on file   Intimate Partner Violence: Not on file   Housing Stability: Unknown (2023)    Housing Stability Vital Sign     Unable to Pay for Housing in the Last Year: No     Number of Places Lived in the Last Year: Not on file     Unstable Housing in the Last Year: No        Physical Exam:     /82 (BP Location: Left  "arm, Patient Position: Sitting, Cuff Size: Adult)   Pulse 81   Temp 97.6 °F (36.4 °C) (Temporal)   Ht 5' 6\" (1.676 m)   Wt (!) 142 kg (312 lb 12.8 oz)   SpO2 100%   BMI 50.49 kg/m²     Physical Exam  Vitals and nursing note reviewed.   Constitutional:       Appearance: Normal appearance. She is normal weight.   HENT:      Head: Normocephalic.      Right Ear: Tympanic membrane, ear canal and external ear normal. There is no impacted cerumen.      Left Ear: Tympanic membrane, ear canal and external ear normal. There is no impacted cerumen.      Nose: Nose normal.      Mouth/Throat:      Mouth: Mucous membranes are moist.      Pharynx: Oropharynx is clear.   Eyes:      Extraocular Movements: Extraocular movements intact.      Conjunctiva/sclera: Conjunctivae normal.      Pupils: Pupils are equal, round, and reactive to light.   Cardiovascular:      Rate and Rhythm: Normal rate and regular rhythm.      Pulses: Normal pulses.      Heart sounds: Normal heart sounds.   Pulmonary:      Effort: Pulmonary effort is normal.      Breath sounds: Normal breath sounds.   Abdominal:      General: Bowel sounds are normal. There is no distension.      Palpations: Abdomen is soft.      Tenderness: There is no abdominal tenderness.   Musculoskeletal:         General: Normal range of motion.      Cervical back: Normal range of motion.   Skin:     General: Skin is warm.      Capillary Refill: Capillary refill takes less than 2 seconds.   Neurological:      General: No focal deficit present.      Mental Status: She is alert and oriented to person, place, and time. Mental status is at baseline.   Psychiatric:         Mood and Affect: Mood normal.         Behavior: Behavior normal.         Thought Content: Thought content normal.         Judgment: Judgment normal.          Data:     Pre-operative work-up    Laboratory Results: I have personally reviewed the pertinent laboratory results/reports      EKG: I have personally reviewed " pertinent reports.      Chest x-ray: I have personally reviewed pertinent reports.        Previous cardiopulmonary studies within the past year:  Echocardiogram: n/a  Cardiac Catheterization: n/a  Stress Test: n/a  Pulmonary Function Testing: n/a      Assessment & Recommendations:     1. Preoperative examination        2. Tremors of nervous system        3. Cognitive impairment            Pre-Op Evaluation Assessment  53 y.o. female with planned surgery: MRI with sedation .    Known risk factors for perioperative complications: Coronary disease  Chronic pulmonary disease.        Current medications which may produce withdrawal symptoms if withheld perioperatively: oxcarbazepine, lurasidone, clonidine. .    Pre-Op Evaluation Plan  1. Further preoperative workup as follows:   - None; no further preoperative work-up is required    2. Medication Management/Recommendations:   - None, continue medication regimen including morning of surgery, with sip of water  - Patient has been instructed to avoid herbs or non-directed vitamins the week prior to surgery to ensure no drug interactions with perioperative surgical and anesthetic medications.  - Patient should continue antihypertensive medications up through and including the day of surgery.   - Patient should hold diuretic medication the day of surgery.  - Patient has been instructed to avoid aspirin containing medications or non-steroidal anti-inflammatory drugs for the week preceding surgery.    3. Prophylaxis for cardiac events with perioperative beta-blockers: not indicated.    4. Patient requires further consultation with: None    Clearance  Patient is CLEARED for surgery without any additional cardiac testing.     JHONATAN Armando  [address] [address]  Phone#  [phone]  Fax#  [phone]

## 2024-03-06 ENCOUNTER — OFFICE VISIT (OUTPATIENT)
Dept: PHYSICAL THERAPY | Age: 54
End: 2024-03-06
Payer: MEDICARE

## 2024-03-06 DIAGNOSIS — I89.0 LYMPHEDEMA: Primary | ICD-10-CM

## 2024-03-06 PROCEDURE — 97016 VASOPNEUMATIC DEVICE THERAPY: CPT

## 2024-03-06 PROCEDURE — 97110 THERAPEUTIC EXERCISES: CPT

## 2024-03-07 ENCOUNTER — APPOINTMENT (OUTPATIENT)
Dept: LAB | Facility: CLINIC | Age: 54
End: 2024-03-07
Payer: MEDICARE

## 2024-03-07 ENCOUNTER — TELEPHONE (OUTPATIENT)
Dept: HEMATOLOGY ONCOLOGY | Facility: CLINIC | Age: 54
End: 2024-03-07

## 2024-03-07 ENCOUNTER — TELEPHONE (OUTPATIENT)
Dept: BARIATRICS | Facility: CLINIC | Age: 54
End: 2024-03-07

## 2024-03-07 DIAGNOSIS — Z86.718 HISTORY OF DVT (DEEP VEIN THROMBOSIS): ICD-10-CM

## 2024-03-07 DIAGNOSIS — Z86.718 HISTORY OF DVT (DEEP VEIN THROMBOSIS): Primary | ICD-10-CM

## 2024-03-07 DIAGNOSIS — Z51.81 ANTICOAGULATION MANAGEMENT ENCOUNTER: ICD-10-CM

## 2024-03-07 DIAGNOSIS — Z79.01 ANTICOAGULATION MANAGEMENT ENCOUNTER: ICD-10-CM

## 2024-03-07 LAB
INR PPP: 2.18 (ref 0.84–1.19)
PROTHROMBIN TIME: 23.9 SECONDS (ref 11.6–14.5)

## 2024-03-07 PROCEDURE — 36415 COLL VENOUS BLD VENIPUNCTURE: CPT

## 2024-03-07 PROCEDURE — 85610 PROTHROMBIN TIME: CPT

## 2024-03-07 NOTE — TELEPHONE ENCOUNTER
Weight History     Highest Weight: 360#  Lowest Weight: unknown    Past Attempts at Weight Loss: Low Carb/High Protein Diet ([place], [place])-picky eater    Food Recall  Breakfast: 0  Snack: 0  Lunch: 0-today had a couple hot dogs  Snack: 0  Dinner: eats out or canned roast beef or cereal  Snack: oranges lately    Beverages: hardly drinks  Volume of Beverage Intake: maybe 4 oz juice    Frequency of Dining out: 5 days/week    Would the patient benefit from visit with  specialist?: Boredom Eating and Stress Eating    Physical Activity Intake  Activity:  therapy only  Frequency of Exercise: 2 times/week  Physical Limitations to Exercise: plantar fasciitis, swelling in legs    Review of Programs  Overview of medical weight management programs provided?: Yes   Is patient interested in discussing medication?: Yes   Is patient interested in discussing bariatric surgery?: No  Does patient know which pathway they are interested in?: No

## 2024-03-07 NOTE — TELEPHONE ENCOUNTER
Spoke to pt informing that JHONATAN Ramirez put in standing order for labs to be drawn. Pt verbalized that she had blood drawn today and that the lab was just waiting for the script to be put in the system.

## 2024-03-07 NOTE — PROGRESS NOTES
"Daily Note     Today's date: 3/6/2024  Patient name: Blanca Mason  : 1970  MRN: [id number]  Referring provider: Shameka Phillips CRNP  Dx:   Encounter Diagnosis     ICD-10-CM    1. Lymphedema  I89.0                      Subjective: good tolerance reported with HEP performing 30 reps each exer currently  Erin MEI PTA supervising units  30  min       Objective: See treatment diary below      Assessment: Tolerated treatment well. Patient would benefit from continued PT      Plan: Continue per plan of care.   Add le mmt mat exer in future . Continue compression pump trial both le's on large hi lo mat table .      Precautions: Allergies  Reviewed by Sarah Elam RN at 10:28 AM   Severity Reactions Comments   Percocet [oxycodone-acetaminophen] High Headache Severe headaches   (denies issues with Tylenol)   Povidone Iodine Medium Rash Unsure if betadine or gauze post surgical. Got rash on leg. Has  Had itchy rashes after every surgery prep and IV insertion   Chlorhexidine Low Rash Per pt \"able to use the liquid soap--thinkd reaction from the sponges or wipes\"         Manuals 2/21/24 2/27/24 2/28 3/6          I eval             Mld massage and soft tissue mobilization both le's   45 min man           Fit for compression knee highs  Pt to bring in her knee highs for assessment by PT next visit Measurements taken today for possible compression wraps versus knee highs part of manual             Tubigrip size g given for both le's to be worn daily            Neuro Re-Ed             Nu step   15 min r 3 15 min 15  min r 3         Le lymphedema exer  10 reps each  15 min Performing packet at home          Add slr x 3 bilateral              Hip abd with theraband             Bridging                                        Ther Ex                                                                                                                     Ther Activity                                       Gait Training          "                              Modalities             Bilateral le compression pump with le elevation 40 mmHG   nv B/l LE 40mmHg 30 min elevated Bilat le 40 mmm HG 30 min

## 2024-03-07 NOTE — TELEPHONE ENCOUNTER
Patient Call    Who are you speaking with? Patient    If it is not the patient, are they listed on an active communication consent form? N/A   What is the reason for this call? The patient states she was just at the lab to complete orders for Heather but the lab still needs the office to place the orders in the chart.    Does this require a call back? N/A   If a call back is required, please list best call back number [phone]   If a call back is required, advise that a message will be forwarded to their care team and someone will return their call as soon as possible.   Did you relay this information to the patient? N/A

## 2024-03-08 ENCOUNTER — OFFICE VISIT (OUTPATIENT)
Dept: PHYSICAL THERAPY | Age: 54
End: 2024-03-08
Payer: MEDICARE

## 2024-03-08 DIAGNOSIS — I89.0 LYMPHEDEMA: Primary | ICD-10-CM

## 2024-03-08 PROBLEM — G24.01 DYSKINESIA, DRUG-INDUCED: Status: ACTIVE | Noted: 2024-03-08

## 2024-03-08 PROCEDURE — 97140 MANUAL THERAPY 1/> REGIONS: CPT | Performed by: PHYSICAL THERAPIST

## 2024-03-08 NOTE — ASSESSMENT & PLAN NOTE
Blanca Mason is a very pleasant 53 y.o. female with a complex history including methamphetamine abuse who presents for follow up of generalized dyskinesias affecting both upper and lower extremities.     To review, symptom onset believed to be triggered after patient started a psychiatric medication but does not recall which one. A confounding factor is her history of methamphetamine abuse which she was actively using at onset. Tremor persisted despite stopping its use.  At the time patient was also on Depakote and carbamazepine for her psychiatric comorbidities, which can cause tremors.  I had talked to her psychiatrist over the phone who was very pleasant and already decreasing the Depakote and increasing her Trileptal.    Today she reports that her tremors are doing significantly better.  On physical exam they are essentially gone.  She reports having a side-to-side tremor of her lower jaw when she opens her mouth ever since starting Invega however it is not significant enough to stop the medication as she feels this is helping her significantly.  Patient continues to abstain from methamphetamine use since September 2, 2023.  She has an upcoming appointment with weight management which I highly encourage.  She will be returning to work on the 12th with [employer].    At this time, I am happy to hear that patient is doing very well.  It is likely that her tremors were drug-induced given that upon removing offending agent these have resolved.  At this time I we will make no changes to her medical management and defer to her psychiatrist.  I will follow-up with her 1 more time in 6 months to ensure stability.

## 2024-03-08 NOTE — PROGRESS NOTES
"Daily Note     Today's date: 3/8/2024  Patient name: Blanca Mason  : 1970  MRN: [id number]  Referring provider: Shameka Phillips CRNP  Dx:   Encounter Diagnosis     ICD-10-CM    1. Lymphedema  I89.0           Start Time: 953  Stop Time: 1050  Total time in clinic (min): 57 minutes    Subjective: Blanca reports \"I think my feet aren't as swollen\" upon arrival to therapy today.      Objective: See treatment diary below      Assessment: Tolerated treatment well. Patient would benefit from continued PT  PT performing MLD today as patient arrived early and PT able to accommodate for manuals today. Patient tolerating manuals well today, concluded session with Nustep today with good tolerance.     Plan: Continue per plan of care.   Add le mmt mat exer in future . Continue compression pump trial both le's on large hi lo mat table .      Precautions: Allergies  Reviewed by Sarah Elam RN at 10:28 AM   Severity Reactions Comments   Percocet [oxycodone-acetaminophen] High Headache Severe headaches   (denies issues with Tylenol)   Povidone Iodine Medium Rash Unsure if betadine or gauze post surgical. Got rash on leg. Has  Had itchy rashes after every surgery prep and IV insertion   Chlorhexidine Low Rash Per pt \"able to use the liquid soap--thinkd reaction from the sponges or wipes\"         Manuals 2/21/24 2/27/24 2/28 3/6 3/8         I eval             Mld massage and soft tissue mobilization both le's   45 min man   38 min         Fit for compression knee highs  Pt to bring in her knee highs for assessment by PT next visit Measurements taken today for possible compression wraps versus knee highs part of manual             Tubigrip size g given for both le's to be worn daily            Neuro Re-Ed             Nu step   15 min r 3 15 min 15  min r 3 15 min R3        Le lymphedema exer  10 reps each  15 min Performing packet at home          Add slr x 3 bilateral              Hip abd with theraband           "   Bridging                                        Ther Ex                                                                                                                     Ther Activity                                       Gait Training                                       Modalities             Bilateral le compression pump with le elevation 40 mmHG   nv B/l LE 40mmHg 30 min elevated Bilat le 40 mmm HG 30 min

## 2024-03-11 ENCOUNTER — TELEPHONE (OUTPATIENT)
Dept: HEMATOLOGY ONCOLOGY | Facility: CLINIC | Age: 54
End: 2024-03-11

## 2024-03-11 NOTE — TELEPHONE ENCOUNTER
Informed to Blanca that her INR is therapeutic, 2.18.  She can continue taking Coumadin 5 mg daily.  We will recheck her counts on Wednesday.  She voiced understanding.

## 2024-03-13 ENCOUNTER — OFFICE VISIT (OUTPATIENT)
Dept: BARIATRICS | Facility: CLINIC | Age: 54
End: 2024-03-13
Payer: MEDICARE

## 2024-03-13 VITALS
BODY MASS INDEX: 50.02 KG/M2 | HEART RATE: 68 BPM | SYSTOLIC BLOOD PRESSURE: 138 MMHG | DIASTOLIC BLOOD PRESSURE: 84 MMHG | WEIGHT: 293 LBS | RESPIRATION RATE: 16 BRPM | HEIGHT: 64 IN | TEMPERATURE: 97.1 F

## 2024-03-13 DIAGNOSIS — R73.03 PREDIABETES: Primary | ICD-10-CM

## 2024-03-13 DIAGNOSIS — I10 BENIGN ESSENTIAL HYPERTENSION: Chronic | ICD-10-CM

## 2024-03-13 DIAGNOSIS — G47.33 OSA (OBSTRUCTIVE SLEEP APNEA): Chronic | ICD-10-CM

## 2024-03-13 PROCEDURE — 99204 OFFICE O/P NEW MOD 45 MIN: CPT | Performed by: PHYSICIAN ASSISTANT

## 2024-03-13 RX ORDER — METFORMIN HYDROCHLORIDE 750 MG/1
750 TABLET, EXTENDED RELEASE ORAL
Qty: 30 TABLET | Refills: 1 | Status: SHIPPED | OUTPATIENT
Start: 2024-03-13

## 2024-03-13 RX ORDER — ATOMOXETINE 60 MG/1
CAPSULE ORAL
COMMUNITY
Start: 2024-03-06

## 2024-03-13 RX ORDER — BUPROPION HYDROCHLORIDE 150 MG/1
TABLET ORAL
COMMUNITY
Start: 2024-03-01

## 2024-03-13 NOTE — PROGRESS NOTES
Assessment/Plan:    Obesity, morbid, BMI 50 or higher ([facility])  -Discussed options of HealthyCORE-Intensive Lifestyle Intervention Program, Very Low Calorie Diet-VLCD, and Conservative Program and the role of weight loss medications.  -Not a surgical candidate due to schizoaffective d/o and hospitalizations  -Initial weight loss goal of 5-10% weight loss for improved health  -Screening labs and records reviewed from prior  D A1c 1/29/24 did increase to 5.7    -Patient is interested in pursuing Conservative Program  -Calorie goals (1800), sample menu (7089-6474), portion size guidelines, and food logging reviewed with the patient.       Goals:  -recommend eating more often and avoid skipping meals. Recommend to continue protein shake for lunch.  -try to minimize dinning out  - To drink at least 64oz of water daily.No sugary beverages.  -currently doing PT 4 x week    Med options limited. On topamax, wellbutrin and strattera.  To start on metformin for pre-diabetes      Initial Weight:304.6  Goal Weight:220        MAG (obstructive sleep apnea)  On auto bipap  Could improve with weight loss in the future    Benign essential hypertension  On losartan  -should improve with weight loss, dietary, and lifestyle changes      Follow up in approximately  2 month nurse visit and 4 months  with Non-Surgical Physician/Advanced Practitioner.  I have spent a total time of 40 minutes on 03/13/24 in caring for this patient including Diagnostic results, Prognosis, Risks and benefits of tx options, Instructions for management, Patient and family education, Importance of tx compliance, Risk factor reductions, Impressions, Counseling / Coordination of care, Documenting in the medical record, Reviewing / ordering tests, medicine, procedures  , and Obtaining or reviewing history  .      Diagnoses and all orders for this visit:    Prediabetes  -     metFORMIN (GLUCOPHAGE-XR) 750 mg 24 hr tablet; Take 1 tablet (750 mg total) by mouth daily with  "breakfast    BMI 50.0-59.9, adult ([facility])  -     Ambulatory referral to Weight Management    MAG (obstructive sleep apnea)    Benign essential hypertension    Other orders  -     atoMOXetine (STRATTERA) 60 mg capsule  -     buPROPion (WELLBUTRIN XL) 150 mg 24 hr tablet          Subjective:   Chief Complaint   Patient presents with    Consult     MWM Consult;Gw-220lb;Pt has sleep Apnea       Patient ID: Blanca Mason  is a 53 y.o. female with excess weight/obesity here to pursue weight management.    Past Medical History:   Diagnosis Date    Anxiety     Arthritis     oa cassandra knees    Asthma     good control- no medications    Yan's esophagus     Bipolar affective disorder ([facility])     Cannabis abuse with unspecified 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[facility])     Chronic pain disorder     lumbar    COPD (chronic obstructive pulmonary disease) ([facility])     CPAP (continuous positive airway pressure) dependence     DDD (degenerative disc disease), lumbar 04/09/2015    Degenerative disc disease at L5-S1 level     Deliberate self-cutting     9/15/22per pt has not done recently-- does see a therapist and psychiatrist regularly    Depression 09/16/2008    Disease of thyroid gland     hypo    MARTINEZ (dyspnea on exertion)     per pt \"has had this with exertion and not new\"    Drug overdose 10/28/2008    suicide attempt and again drug overdose 7/2022-- [facility] floor and than transferred to [facility]    Dysphagia     Dyspnea     Edema     BLE    Family history of blood clots     GERD (gastroesophageal reflux disease)     Headache(784.0)     Headache, tension-type     Hemorrhage of rectum and anus 10/02/2017    Formatting of this note might be different from the original.  Added automatically from request for surgery [id number]    High blood pressure     History of COVID-19 12/30/2020    Symptoms started on 12/30/2020 and then got worse.  Today she is feeling a little bit better.  She is a candidate for monoclonal antibody infusion and set for " 1/6/21 @ [facility] at [facility]. 01/07/21 - telemedicine -     Hyperlipidemia     Hypertension     Knee pain, bilateral     Especially right    Medial epicondylitis of elbow, left 04/03/2018    Formatting of this note might be different from the original.  Added automatically from request for surgery [id number]    Medial epicondylitis of right elbow 07/17/2023    Medical marijuana use     Memory loss     Migraines     Obesity     Overactive bladder     Primary osteoarthritis of both knees 08/08/2018    Pulmonary emboli (HCC)     Restrictive lung disease 02/01/2017    Last Assessment & Plan:   Formatting of this note might be different from the original.  I reviewed this problem throughout the rest of the note. Please refer to the other assessments for details.    Sjogren's disease (HCC)     Sleep apnea     Stress incontinence     Suicidal ideations     Teeth missing     Tremor     per pt Tremors of Right Leg and both Arms    Visual impairment     Wears glasses        HPI: Here for MWM consult  She was in the hospital for 2 months due to suicide attempt and for bipolar d/o.  Not active while there. Now working 2 days a week .  She had lost 100 lb prior.  Typically eating 1 time a day. She has been trying to eat this way to help with weight loss despite being hungry    Often dines out at dinner. Fast food  Dislikes: greek yogurt, oatmeal, vegetables except likes peas and corn    Diet recall:  L: slim fast-just started  D: varies    Highest Weight: 360#  Lowest Weight: unknown    Obesity/Excess Weight:  Severity:  class III with MAG  and HTN  Onset:  years    Modifiers: Diet and Exercise and seen by RD here prior  Contributing factors: Poor Food Choices, Insufficient Physical Activity, and Medications    Hydration:little a cup of water or juice.    Alcohol: none  Exercise:PT 4 times a week.   Occupation:2 x week-varies  Sleep:  Dining out/takeout:alot      The following portions of the patient's history were reviewed and  updated as appropriate: She  has a past medical history of Anxiety, Arthritis, Asthma, Yan's esophagus, Bipolar affective disorder ([facility]), Cannabis abuse with unspecified 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[facility]), Chronic pain disorder, COPD (chronic obstructive pulmonary disease) ([facility]), CPAP (continuous positive airway pressure) dependence, DDD (degenerative disc disease), lumbar (04/09/2015), Degenerative disc disease at L5-S1 level, Deliberate self-cutting, Depression (09/16/2008), Disease of thyroid gland, MARTINEZ (dyspnea on exertion), Drug overdose (10/28/2008), Dysphagia, Dyspnea, Edema, Family history of blood clots, GERD (gastroesophageal reflux disease), Headache(784.0), Headache, tension-type, Hemorrhage of rectum and anus (10/02/2017), High blood pressure, History of COVID-19 (12/30/2020), Hyperlipidemia, Hypertension, Knee pain, bilateral, Medial epicondylitis of elbow, left (04/03/2018), Medial epicondylitis of right elbow (07/17/2023), Medical marijuana use, Memory loss, Migraines, Obesity, Overactive bladder, Primary osteoarthritis of both knees (08/08/2018), Pulmonary emboli ([facility]), Restrictive lung disease (02/01/2017), Sjogren's disease ([facility]), Sleep apnea, Stress incontinence, Suicidal ideations, Teeth missing, Tremor, Visual impairment, and Wears glasses.  She   Patient Active Problem List    Diagnosis Date Noted    Dyskinesia, drug-induced 03/08/2024    Excessive daytime sleepiness 02/22/2024    Ingrown toenail 12/02/2023    History of DVT (deep vein thrombosis) 11/30/2023    Grief 11/11/2023    Toxic encephalopathy 11/08/2023    Lymphedema 10/30/2023    Acute saddle pulmonary embolism (HCC) 10/16/2023    Acute deep vein thrombosis of lower leg, left (HCC) 10/16/2023    Polysubstance abuse (HCC) 09/29/2023    Restless leg syndrome 09/29/2023    Intentional overdose ([facility]) 09/27/2023    Tremors of nervous system 09/22/2023    History of pulmonary embolism 09/02/2023    Elevated troponin 09/02/2023     Cervicalgia 08/10/2023    Bilateral leg edema 07/06/2023    Chronic migraine without aura without status migrainosus, not intractable 05/31/2023    Chronic eczematous otitis externa of both ears 02/23/2023    Intentional self-harm by unspecified sharp object, sequela ([facility]) 02/09/2023    Suicidal deliberate poisoning ([facility]) 07/13/2022    Knee pain, right 02/23/2022    Platelets decreased ([facility]) 01/19/2022    Diarrhea 01/06/2022    Ecchymosis 01/06/2022    Anxiety 01/06/2022    Borderline personality disorder ([facility]) 01/06/2022    Contusion of elbow 12/21/2021    Cognitive impairment 10/07/2021    Substance abuse ([facility]) 07/07/2021    Potential for cognitive impairment 06/17/2021    Mood disorder ([facility]) 06/17/2021    Chronic tension-type headache, intractable 03/10/2021    Obesity, morbid, BMI 50 or higher ([facility]) 01/04/2021    History of COVID-19 12/30/2020    Memory difficulties 08/06/2020    BMI 45.0-49.9, adult ([facility]) 01/02/2020    Mixed hyperlipidemia 10/30/2019    COPD (chronic obstructive pulmonary disease) ([facility]) 06/12/2019    Major depressive disorder 05/23/2019    Sjogren's syndrome ([facility]) 05/23/2019    MAG (obstructive sleep apnea) 04/24/2018    Overactive bladder 06/08/2017    Schizoaffective disorder, bipolar type ([facility]) 03/17/2017    Hypothyroidism 03/17/2017    Family history of renal cancer 04/26/2016    Yan's esophagus determined by biopsy 10/19/2015    Medical clearance for psychiatric admission 09/14/2015    Urinary incontinence 03/31/2015    Benign essential hypertension 07/02/2014    Edema 03/26/2014    Chronic low back pain 04/07/2012     She  has a past surgical history that includes REPAIR LACERATION (Left); Colonoscopy; TONSILECTOMY AND ADNOIDECTOMY; Esophagogastroduodenoscopy; Vein ligation (Bilateral); Elbow surgery (Left); Dilation and curettage of uterus; pr sphincterotomy anal division sphincter spx (N/A, 08/31/2018); Carpal tunnel release (Left); Knee arthroscopy (Bilateral); Cholecystectomy  (05/2003); Foot surgery (Left); [place] tooth extraction; pr rpr umbilical hrna 5 yrs/> reducible (N/A, 04/24/2019); Hysterectomy; Oophorectomy (Left, 10/2015); Reduction mammaplasty (Bilateral, 1999); Rotator cuff repair (Left); pr gastrocnemius recession (Left, 02/24/2021); US guided msk procedure (04/22/2021); Breast cyst excision (Right, 1989); Replacement total knee (Right); pr tnot elbow lateral/medial debride open tdn rpr (Left, 09/20/2022); Hernia repair; and Vascular surgery.  Her family history includes Alcohol abuse in her sister; Arthritis in her mother; Asthma in her sister; Cancer in her mother; Colon cancer in her family, maternal uncle, maternal uncle, and paternal uncle; Depression in her mother; Diabetes in her mother; Kidney cancer in her mother; No Known Problems in her father, maternal grandfather, maternal grandmother, paternal grandfather, paternal grandmother, and sister; Psychiatric Illness in her mother; Stroke in her mother.  She  reports that she quit smoking about 6 years ago. Her smoking use included cigarettes. She started smoking about 39 years ago. She has a 66 pack-year smoking history. She has never used smokeless tobacco. She reports that she does not currently use alcohol. She reports current drug use. Drugs: Marijuana and Methamphetamines.  Current Outpatient Medications   Medication Sig Dispense Refill    atoMOXetine (STRATTERA) 40 mg capsule Take 1 capsule (40 mg total) by mouth daily 30 capsule 1    atoMOXetine (STRATTERA) 60 mg capsule       buPROPion (FORFIVO XL) 450 MG 24 hr tablet Take 1 tablet (450 mg total) by mouth daily 30 tablet 1    buPROPion (WELLBUTRIN XL) 150 mg 24 hr tablet       Cholecalciferol (Vitamin D3) 125 MCG (5000 UT) CAPS       cloNIDine (CATAPRES) 0.2 mg tablet Take 1 tablet (0.2 mg total) by mouth every 12 (twelve) hours 60 tablet 1    cyanocobalamin (VITAMIN B-12) 1000 MCG tablet Take 1 tablet (1,000 mcg total) by mouth daily 30 tablet 1    furosemide  (LASIX) 20 mg tablet Take 1 tablet (20 mg total) by mouth daily 30 tablet 1    levothyroxine (Euthyrox) 125 mcg tablet Take 1 tablet (125 mcg total) by mouth daily in the early morning 30 tablet 1    losartan (COZAAR) 100 MG tablet Take 1 tablet (100 mg total) by mouth daily 30 tablet 1    lurasidone (LATUDA) 40 mg tablet       melatonin 3 mg Take 1 tablet (3 mg total) by mouth daily at bedtime 30 tablet 0    metFORMIN (GLUCOPHAGE-XR) 750 mg 24 hr tablet Take 1 tablet (750 mg total) by mouth daily with breakfast 30 tablet 1    naltrexone (REVIA) 50 mg tablet Take 1 tablet (50 mg total) by mouth daily 30 tablet 1    OXcarbazepine (TRILEPTAL) 150 mg tablet Take 3 tablets (450 mg total) by mouth every 12 (twelve) hours 180 tablet 1    OXcarbazepine (TRILEPTAL) 300 mg tablet Take 300 mg by mouth every 12 (twelve) hours      oxybutynin (DITROPAN) 5 mg tablet Take 1 tablet (5 mg total) by mouth 2 (two) times a day 60 tablet 1    paliperidone palmitate ER (INVEGA) 234 mg/1.5 mL IM injection 1.5 mL (234 mg total) by IM- Deltoid route every 4 weeks Do not start before February 28, 2024.      pantoprazole (PROTONIX) 40 mg tablet Take 1 tablet (40 mg total) by mouth daily in the early morning 30 tablet 1    saccharomyces boulardii (FLORASTOR) 250 mg capsule Take 1 capsule (250 mg total) by mouth 2 (two) times a day 60 capsule 1    topiramate (TOPAMAX) 200 MG tablet Take 1 tablet (200 mg total) by mouth 2 (two) times a day 60 tablet 1    warfarin (COUMADIN) 5 mg tablet Take 1 tablet (5 mg total) by mouth daily 90 tablet 0     No current facility-administered medications for this visit.     Current Outpatient Medications on File Prior to Visit   Medication Sig    atoMOXetine (STRATTERA) 40 mg capsule Take 1 capsule (40 mg total) by mouth daily    atoMOXetine (STRATTERA) 60 mg capsule     buPROPion (FORFIVO XL) 450 MG 24 hr tablet Take 1 tablet (450 mg total) by mouth daily    buPROPion (WELLBUTRIN XL) 150 mg 24 hr tablet      Cholecalciferol (Vitamin D3) 125 MCG (5000 UT) CAPS     cloNIDine (CATAPRES) 0.2 mg tablet Take 1 tablet (0.2 mg total) by mouth every 12 (twelve) hours    cyanocobalamin (VITAMIN B-12) 1000 MCG tablet Take 1 tablet (1,000 mcg total) by mouth daily    furosemide (LASIX) 20 mg tablet Take 1 tablet (20 mg total) by mouth daily    levothyroxine (Euthyrox) 125 mcg tablet Take 1 tablet (125 mcg total) by mouth daily in the early morning    losartan (COZAAR) 100 MG tablet Take 1 tablet (100 mg total) by mouth daily    lurasidone (LATUDA) 40 mg tablet     melatonin 3 mg Take 1 tablet (3 mg total) by mouth daily at bedtime    naltrexone (REVIA) 50 mg tablet Take 1 tablet (50 mg total) by mouth daily    OXcarbazepine (TRILEPTAL) 150 mg tablet Take 3 tablets (450 mg total) by mouth every 12 (twelve) hours    OXcarbazepine (TRILEPTAL) 300 mg tablet Take 300 mg by mouth every 12 (twelve) hours    oxybutynin (DITROPAN) 5 mg tablet Take 1 tablet (5 mg total) by mouth 2 (two) times a day    paliperidone palmitate ER (INVEGA) 234 mg/1.5 mL IM injection 1.5 mL (234 mg total) by IM- Deltoid route every 4 weeks Do not start before February 28, 2024.    pantoprazole (PROTONIX) 40 mg tablet Take 1 tablet (40 mg total) by mouth daily in the early morning    saccharomyces boulardii (FLORASTOR) 250 mg capsule Take 1 capsule (250 mg total) by mouth 2 (two) times a day    topiramate (TOPAMAX) 200 MG tablet Take 1 tablet (200 mg total) by mouth 2 (two) times a day    warfarin (COUMADIN) 5 mg tablet Take 1 tablet (5 mg total) by mouth daily     No current facility-administered medications on file prior to visit.     She is allergic to percocet [oxycodone-acetaminophen], povidone iodine, and chlorhexidine..    Review of Systems   Constitutional:  Negative for fever.   Respiratory:  Negative for shortness of breath.    Cardiovascular:  Negative for chest pain and palpitations.   Gastrointestinal:  Negative for abdominal pain, constipation,  "diarrhea and vomiting.   Genitourinary:  Negative for difficulty urinating.   Musculoskeletal:  Positive for arthralgias and back pain.   Skin:  Negative for rash.   Neurological:  Negative for headaches.   Psychiatric/Behavioral:  Negative for dysphoric mood. The patient is not nervous/anxious.        Objective:    /84   Pulse 68   Temp (!) 97.1 °F (36.2 °C)   Resp 16   Ht 5' 4\" (1.626 m)   Wt (!) 138 kg (304 lb 9.6 oz)   BMI 52.28 kg/m²     Physical Exam  Vitals and nursing note reviewed.   Constitutional:       General: She is not in acute distress.     Appearance: She is well-developed. She is obese.   HENT:      Head: Normocephalic and atraumatic.   Eyes:      Conjunctiva/sclera: Conjunctivae normal.   Pulmonary:      Effort: Pulmonary effort is normal. No respiratory distress.   Skin:     Findings: No rash (visible).   Neurological:      Mental Status: She is alert and oriented to person, place, and time.   Psychiatric:         Mood and Affect: Mood normal.         Behavior: Behavior normal.         "

## 2024-03-13 NOTE — PATIENT INSTRUCTIONS
Try these alternative food options:  Protein shakes- Premier, Pure protein, Fairlife, Iconic  Lactose free protein shakes-  Fairlife, Ripple, Iconic, Sultana  Protein bars- Quest, Pure protein  Ice cream- Gerard's, Yasso, Enlightened, Halo Top   Chips- Quest protein chips, Cheese crisps   Bread- 647 wheat, Protein Keto bread, whole wheat yana bread, low carb/high protein wraps  Pasta- Chickpea pasta, Pasta zero or similar  Rice- Cauliflower rice, quinoa, wild rice, brown rice  Fruits- berries, cantaloupe, peaches, apples, oranges  Yogurt- Ratio (25g protein), Skyr, Two good, Chobani 60 lindsey or 100 lindsey, Oikos triple zero  Sugar alternatives- Stevia, Monk fruit, Swerve

## 2024-03-13 NOTE — ASSESSMENT & PLAN NOTE
-Discussed options of HealthyCORE-Intensive Lifestyle Intervention Program, Very Low Calorie Diet-VLCD, and Conservative Program and the role of weight loss medications.  -Not a surgical candidate due to schizoaffective d/o and hospitalizations  -Initial weight loss goal of 5-10% weight loss for improved health  -Screening labs and records reviewed from prior  D A1c 1/29/24 did increase to 5.7    -Patient is interested in pursuing Conservative Program  -Calorie goals (1800), sample menu (6107-4806), portion size guidelines, and food logging reviewed with the patient.       Goals:  -recommend eating more often and avoid skipping meals. Recommend to continue protein shake for lunch.  -try to minimize dinning out  - To drink at least 64oz of water daily.No sugary beverages.  -currently doing PT 4 x week    Med options limited. On topamax, wellbutrin and strattera.  To start on metformin for pre-diabetes      Initial Weight:304.6  Goal Weight:220

## 2024-03-14 ENCOUNTER — APPOINTMENT (OUTPATIENT)
Dept: LAB | Facility: CLINIC | Age: 54
End: 2024-03-14
Payer: MEDICARE

## 2024-03-14 ENCOUNTER — OFFICE VISIT (OUTPATIENT)
Dept: PHYSICAL THERAPY | Age: 54
End: 2024-03-14
Payer: MEDICARE

## 2024-03-14 DIAGNOSIS — I89.0 LYMPHEDEMA: Primary | ICD-10-CM

## 2024-03-14 DIAGNOSIS — Z51.81 ANTICOAGULATION MANAGEMENT ENCOUNTER: ICD-10-CM

## 2024-03-14 DIAGNOSIS — Z79.01 ANTICOAGULATION MANAGEMENT ENCOUNTER: ICD-10-CM

## 2024-03-14 DIAGNOSIS — Z86.718 HISTORY OF DVT (DEEP VEIN THROMBOSIS): ICD-10-CM

## 2024-03-14 LAB
INR PPP: 1.92 (ref 0.84–1.19)
PROTHROMBIN TIME: 21.6 SECONDS (ref 11.6–14.5)

## 2024-03-14 PROCEDURE — 36415 COLL VENOUS BLD VENIPUNCTURE: CPT

## 2024-03-14 PROCEDURE — 97016 VASOPNEUMATIC DEVICE THERAPY: CPT

## 2024-03-14 PROCEDURE — 85610 PROTHROMBIN TIME: CPT

## 2024-03-14 PROCEDURE — 97110 THERAPEUTIC EXERCISES: CPT

## 2024-03-14 PROCEDURE — 97140 MANUAL THERAPY 1/> REGIONS: CPT

## 2024-03-15 ENCOUNTER — TELEPHONE (OUTPATIENT)
Dept: HEMATOLOGY ONCOLOGY | Facility: CLINIC | Age: 54
End: 2024-03-15

## 2024-03-15 NOTE — PROGRESS NOTES
"Daily Note     Today's date: 3/14/2024  Patient name: Blanca Mason  : 1970  MRN: [id number]  Referring provider: Shameka Phillips CRNP  Dx:   Encounter Diagnosis     ICD-10-CM    1. Lymphedema  I89.0                      Subjective: no new c/o's . Piyush Morales PT supervising units utilized today      Objective: See treatment diary below      Assessment: Tolerated treatment well. Patient would benefit from continued PT      Plan: Continue per plan of care.      Precautions: Allergies  Reviewed by Sarah Elam RN at 10:28 AM   Severity Reactions Comments   Percocet [oxycodone-acetaminophen] High Headache Severe headaches   (denies issues with Tylenol)   Povidone Iodine Medium Rash Unsure if betadine or gauze post surgical. Got rash on leg. Has  Had itchy rashes after every surgery prep and IV insertion   Chlorhexidine Low Rash Per pt \"able to use the liquid soap--thinkd reaction from the sponges or wipes\"         Manuals 2/21/24 2/27/24 2/28 3/6 3/8 3/14        I eval             Mld massage and soft tissue mobilization both le's   45 min man   38 min         Fit for compression knee highs  Pt to bring in her knee highs for assessment by PT next visit Measurements taken today for possible compression wraps versus knee highs part of manual    Measurments taken for compression wraps and marce hybrid socks         Tubigrip size g given for both le's to be worn daily            Neuro Re-Ed             Nu step   15 min r 3 15 min 15  min r 3 15 min R3 15 min r 3       Le lymphedema exer  10 reps each  15 min Performing packet at home          Add slr x 3 bilateral              Hip abd with theraband             Bridging              Squats       3 x 10       Hamstring stretch       20 sec x 5       Ther Ex             Heelcord stretch 1min x 1      1 min x 1                                                                                                    Ther Activity                                       Gait " Training                                       Modalities             Bilateral le compression pump with le elevation 40 mmHG   nv B/l LE 40mmHg 30 min elevated Bilat le 40 mmm HG 30 min   40 mmHG 30 min

## 2024-03-15 NOTE — TELEPHONE ENCOUNTER
Spoke with Blanca, informed her to increase her dose of coumadin from 5 mg to 7 mg daily.  We will repeat INR next Wednesday for continued monitoring.

## 2024-03-20 ENCOUNTER — TELEPHONE (OUTPATIENT)
Age: 54
End: 2024-03-20

## 2024-03-20 ENCOUNTER — ANESTHESIA EVENT (OUTPATIENT)
Dept: RADIOLOGY | Facility: HOSPITAL | Age: 54
End: 2024-03-20

## 2024-03-20 ENCOUNTER — APPOINTMENT (OUTPATIENT)
Dept: LAB | Facility: CLINIC | Age: 54
End: 2024-03-20
Payer: MEDICARE

## 2024-03-20 DIAGNOSIS — Z79.01 ANTICOAGULATION MANAGEMENT ENCOUNTER: ICD-10-CM

## 2024-03-20 DIAGNOSIS — Z51.81 ANTICOAGULATION MANAGEMENT ENCOUNTER: ICD-10-CM

## 2024-03-20 DIAGNOSIS — Z86.718 HISTORY OF DVT (DEEP VEIN THROMBOSIS): ICD-10-CM

## 2024-03-20 LAB
INR PPP: 2.65 (ref 0.84–1.19)
PROTHROMBIN TIME: 27.7 SECONDS (ref 11.6–14.5)

## 2024-03-20 PROCEDURE — 85610 PROTHROMBIN TIME: CPT

## 2024-03-20 PROCEDURE — 36415 COLL VENOUS BLD VENIPUNCTURE: CPT

## 2024-03-20 NOTE — ANESTHESIA PREPROCEDURE EVALUATION
Procedure:  MRI CERVICAL SPINE WO CONTRAST    Relevant Problems   ANESTHESIA (within normal limits)      CARDIO   (+) Acute deep vein thrombosis of lower leg, left (HCC)   (+) Benign essential hypertension   (+) Chronic migraine without aura without status migrainosus, not intractable   (+) Mixed hyperlipidemia      ENDO   (+) Hypothyroidism      GI/HEPATIC (within normal limits)      /RENAL (within normal limits)      MUSCULOSKELETAL   (+) Chronic low back pain      NEURO/PSYCH   (+) Anxiety   (+) Chronic low back pain   (+) Chronic migraine without aura without status migrainosus, not intractable   (+) Chronic tension-type headache, intractable   (+) Major depressive disorder      PULMONARY   (+) COPD (chronic obstructive pulmonary disease) (HCC)   (+) MAG (obstructive sleep apnea)      Behavioral Health   (+) Polysubstance abuse ([facility])   (+) Schizoaffective disorder, bipolar type ([facility])      Oncology   (+) Yan's esophagus determined by biopsy      Rheumatology   (+) Sjogren's syndrome (HCC)      Other   (+) History of DVT (deep vein thrombosis)   (+) History of pulmonary embolism   (+) Obesity, morbid, BMI 50 or higher ([facility])      Previous MRI: LMA 4    EKG 12/5/2023:  Normal sinus rhythm  Normal ECG  When compared with ECG of 01-DEC-2023 22:34,  Criteria for Septal infarct are no longer Present    TTE 9/2023:    Left Ventricle: The left ventricular ejection fraction is 60%. Systolic function is normal.    Right Ventricle: Right ventricular cavity size is normal. Systolic function is normal.    Tricuspid Valve: The right ventricular systolic pressure is mildly elevated. The estimated right ventricular systolic pressure is 43.00 mmHg.      Lab Results   Component Value Date    WBC 7.52 01/29/2024    HGB 13.8 01/29/2024    HCT 43.4 01/29/2024    MCV 93 01/29/2024     01/29/2024     Lab Results   Component Value Date    SODIUM 138 01/29/2024    K 4.0 01/29/2024     01/29/2024    CO2 26 01/29/2024     BUN 13 01/29/2024    CREATININE 0.62 01/29/2024    GLUC 88 01/29/2024    CALCIUM 8.6 01/29/2024     Lab Results   Component Value Date    INR 2.65 (H) 03/20/2024    INR 1.92 (H) 03/14/2024    INR 2.18 (H) 03/07/2024    PROTIME 27.7 (H) 03/20/2024    PROTIME 21.6 (H) 03/14/2024    PROTIME 23.9 (H) 03/07/2024     Lab Results   Component Value Date    HGBA1C 5.7 (H) 01/29/2024            Physical Exam    Airway    Mallampati score: II  TM Distance: >3 FB  Neck ROM: full     Dental   No notable dental hx     Cardiovascular  Cardiovascular exam normal    Pulmonary  Pulmonary exam normal     Other Findings  post-pubertal.      Anesthesia Plan  ASA Score- 3     Anesthesia Type- general with ASA Monitors.         Additional Monitors:     Airway Plan: ETT and LMA.           Plan Factors-    Chart reviewed. EKG reviewed. Imaging results reviewed. Existing labs reviewed. Patient summary reviewed.                  Induction- intravenous.    Postoperative Plan-     Informed Consent- Anesthetic plan and risks discussed with patient.  I personally reviewed this patient with the CRNA. Discussed and agreed on the Anesthesia Plan with the CRNA..

## 2024-03-21 ENCOUNTER — HOSPITAL ENCOUNTER (OUTPATIENT)
Dept: RADIOLOGY | Facility: HOSPITAL | Age: 54
Discharge: HOME | End: 2024-03-21
Attending: ANESTHESIOLOGY
Payer: MEDICARE

## 2024-03-21 ENCOUNTER — ANESTHESIA (OUTPATIENT)
Dept: RADIOLOGY | Facility: HOSPITAL | Age: 54
End: 2024-03-21

## 2024-03-21 VITALS
BODY MASS INDEX: 50.02 KG/M2 | OXYGEN SATURATION: 96 % | SYSTOLIC BLOOD PRESSURE: 162 MMHG | RESPIRATION RATE: 18 BRPM | WEIGHT: 293 LBS | HEIGHT: 64 IN | TEMPERATURE: 97 F | DIASTOLIC BLOOD PRESSURE: 84 MMHG | HEART RATE: 64 BPM

## 2024-03-21 DIAGNOSIS — M54.2 CERVICALGIA: ICD-10-CM

## 2024-03-21 PROCEDURE — G1004 CDSM NDSC: HCPCS

## 2024-03-21 PROCEDURE — 72141 MRI NECK SPINE W/O DYE: CPT

## 2024-03-21 RX ORDER — SODIUM CHLORIDE, SODIUM LACTATE, POTASSIUM CHLORIDE, CALCIUM CHLORIDE 600; 310; 30; 20 MG/100ML; MG/100ML; MG/100ML; MG/100ML
INJECTION, SOLUTION INTRAVENOUS CONTINUOUS PRN
Status: DISCONTINUED | OUTPATIENT
Start: 2024-03-21 | End: 2024-03-21

## 2024-03-21 RX ORDER — ONDANSETRON 2 MG/ML
INJECTION INTRAMUSCULAR; INTRAVENOUS AS NEEDED
Status: DISCONTINUED | OUTPATIENT
Start: 2024-03-21 | End: 2024-03-21

## 2024-03-21 RX ORDER — SODIUM CHLORIDE, SODIUM LACTATE, POTASSIUM CHLORIDE, CALCIUM CHLORIDE 600; 310; 30; 20 MG/100ML; MG/100ML; MG/100ML; MG/100ML
125 INJECTION, SOLUTION INTRAVENOUS CONTINUOUS
Status: DISCONTINUED | OUTPATIENT
Start: 2024-03-21 | End: 2024-03-22 | Stop reason: HOSPADM

## 2024-03-21 RX ORDER — LIDOCAINE HYDROCHLORIDE 10 MG/ML
INJECTION, SOLUTION EPIDURAL; INFILTRATION; INTRACAUDAL; PERINEURAL AS NEEDED
Status: DISCONTINUED | OUTPATIENT
Start: 2024-03-21 | End: 2024-03-21

## 2024-03-21 RX ORDER — PROPOFOL 10 MG/ML
INJECTION, EMULSION INTRAVENOUS AS NEEDED
Status: DISCONTINUED | OUTPATIENT
Start: 2024-03-21 | End: 2024-03-21

## 2024-03-21 RX ORDER — DEXAMETHASONE SODIUM PHOSPHATE 10 MG/ML
INJECTION, SOLUTION INTRAMUSCULAR; INTRAVENOUS AS NEEDED
Status: DISCONTINUED | OUTPATIENT
Start: 2024-03-21 | End: 2024-03-21

## 2024-03-21 RX ADMIN — ONDANSETRON 4 MG: 2 INJECTION INTRAMUSCULAR; INTRAVENOUS at 13:04

## 2024-03-21 RX ADMIN — LIDOCAINE HYDROCHLORIDE 50 MG: 10 INJECTION, SOLUTION EPIDURAL; INFILTRATION; INTRACAUDAL; PERINEURAL at 13:04

## 2024-03-21 RX ADMIN — DEXAMETHASONE SODIUM PHOSPHATE 10 MG: 10 INJECTION, SOLUTION INTRAMUSCULAR; INTRAVENOUS at 13:04

## 2024-03-21 RX ADMIN — SODIUM CHLORIDE, SODIUM LACTATE, POTASSIUM CHLORIDE, AND CALCIUM CHLORIDE: .6; .31; .03; .02 INJECTION, SOLUTION INTRAVENOUS at 12:55

## 2024-03-21 RX ADMIN — SODIUM CHLORIDE, SODIUM LACTATE, POTASSIUM CHLORIDE, AND CALCIUM CHLORIDE 125 ML/HR: .6; .31; .03; .02 INJECTION, SOLUTION INTRAVENOUS at 12:15

## 2024-03-21 RX ADMIN — PROPOFOL 250 MG: 10 INJECTION, EMULSION INTRAVENOUS at 13:04

## 2024-03-21 NOTE — ANESTHESIA POSTPROCEDURE EVALUATION
Post-Op Assessment Note    CV Status:  Stable  Pain Score: 0    Pain management: adequate       Mental Status:  Awake   Hydration Status:  Stable   PONV Controlled:  None   Airway Patency:  Patent     Post Op Vitals Reviewed: Yes    No anethesia notable event occurred.    Staff: Anesthesiologist, CRNA               BP   178/96   Temp      Pulse  63   Resp   18   SpO2   97

## 2024-03-21 NOTE — NURSING NOTE
Cervical spine MRI w/o contrast completed and patient tolerated procedure well. Post-procedure vital signs taken and recorded. Report given to [facility] nurse Carisa. Patient placed in for transport to be taken to [facility]. Patient offers no complaints or verbalizes any issues upon leaving MRI.

## 2024-03-27 ENCOUNTER — APPOINTMENT (OUTPATIENT)
Dept: LAB | Facility: CLINIC | Age: 54
End: 2024-03-27
Payer: MEDICARE

## 2024-03-27 ENCOUNTER — TELEPHONE (OUTPATIENT)
Dept: HEMATOLOGY ONCOLOGY | Facility: CLINIC | Age: 54
End: 2024-03-27

## 2024-03-27 DIAGNOSIS — Z51.81 ANTICOAGULATION MANAGEMENT ENCOUNTER: ICD-10-CM

## 2024-03-27 DIAGNOSIS — Z86.718 HISTORY OF DVT (DEEP VEIN THROMBOSIS): ICD-10-CM

## 2024-03-27 DIAGNOSIS — Z79.01 ANTICOAGULATION MANAGEMENT ENCOUNTER: ICD-10-CM

## 2024-03-27 LAB
INR PPP: 3.48 (ref 0.84–1.19)
PROTHROMBIN TIME: 34.2 SECONDS (ref 11.6–14.5)

## 2024-03-27 PROCEDURE — 85610 PROTHROMBIN TIME: CPT

## 2024-03-27 PROCEDURE — 36415 COLL VENOUS BLD VENIPUNCTURE: CPT

## 2024-03-27 NOTE — TELEPHONE ENCOUNTER
Informed Blanca her INR is 3.28, therefore, cut back to coumadin 5 mg daily instead of 7 mg daily.  We will recheck her INR next week and I'll call her with the results.  Patient voiced understanding.

## 2024-03-28 ENCOUNTER — OFFICE VISIT (OUTPATIENT)
Dept: PHYSICAL THERAPY | Age: 54
End: 2024-03-28
Payer: MEDICARE

## 2024-03-28 DIAGNOSIS — I89.0 LYMPHEDEMA: Primary | ICD-10-CM

## 2024-03-28 PROCEDURE — 97110 THERAPEUTIC EXERCISES: CPT

## 2024-03-28 PROCEDURE — 97140 MANUAL THERAPY 1/> REGIONS: CPT

## 2024-03-29 NOTE — PROGRESS NOTES
"Daily Note     Today's date: 3/28/2024  Patient name: Blanca Mason  : 1970  MRN: [id number]  Referring provider: Shameka Phillips CRNP  Dx:   Encounter Diagnosis     ICD-10-CM    1. Lymphedema  I89.0                      Subjective: girth measurements taken. PT to contact [facility] for insurance approval for compression knee highs.       Objective: See treatment diary below      Assessment: Tolerated treatment well. Patient demonstrated fatigue post treatment      Plan: Continue per plan of care.  Awaiting receipt of home compression pump at this time.     Precautions: Allergies  Reviewed by Sarah Elma RN at 10:28 AM   Severity Reactions Comments   Percocet [oxycodone-acetaminophen] High Headache Severe headaches   (denies issues with Tylenol)   Povidone Iodine Medium Rash Unsure if betadine or gauze post surgical. Got rash on leg. Has  Had itchy rashes after every surgery prep and IV insertion   Chlorhexidine Low Rash Per pt \"able to use the liquid soap--thinkd reaction from the sponges or wipes\"         Manuals 2/21/24 2/27/24 2/28 3/6 3/8 3/14 3/28       I eval             Mld massage and soft tissue mobilization both le's   45 min man   38 min   30 min man      Fit for compression knee highs  Pt to bring in her knee highs for assessment by PT next visit Measurements taken today for possible compression wraps versus knee highs part of manual    Measurments taken for compression wraps and marce hybrid socks man        Tubigrip size g given for both le's to be worn daily            Neuro Re-Ed             Nu step   15 min r 3 15 min 15  min r 3 15 min R3 15 min r 3 15 min r 4      Le lymphedema exer  10 reps each  15 min Performing packet at home          Add slr x 3 bilateral              Hip abd with theraband             Bridging              Squats       3 x 10       Hamstring stretch       20 sec x 5 20 sec x 5      Ther Ex             Heelcord stretch 1min x 1      1 min x 1 1 " min x 1                                                                                                   Ther Activity                                       Gait Training                                       Modalities             Bilateral le compression pump with le elevation 40 mmHG   nv B/l LE 40mmHg 30 min elevated Bilat le 40 mmm HG 30 min   40 mmHG 30 min

## 2024-04-01 ENCOUNTER — DOCUMENTATION (OUTPATIENT)
Dept: CASE MANAGEMENT | Facility: HOSPITAL | Age: 54
End: 2024-04-01

## 2024-04-01 NOTE — BEHAVIORAL HEALTH HIGH UTILIZER
Patient Name:Blanca Mason MRN:  [id number]         : 1970     Age: 53 y.o.    Sex: female   Utilization History:  (# of ED visits & IP admits; reasons)  Pt had 17 [facility]-ED visits from 2023-2023. ED presentations were related to SI with no plan or with a plan to overdose on medications. Pt intentionally overdosed on Flexeril twice and Trazodone twice. Pt reports periodic non-compliance with her medications.       Medical presentations were related to chest pain, right knee pain, withdrawing from meth, asthma exacerbation, fecal impaction, abdominal pain, toe or leg pain, dyspnea, SOB, bilateral leg edema, acute Saddle PE, acute respiratory failure. Treatment Recommendations & Presentation:  Presentation in the ED:  Pt typically presents self to ED's. ED visits were related to SI with no plan or with a plan to overdose on medications. Pt intentionally overdosed on Flexeril twice and Trazodone twice. Pt reports periodic non-compliance with her medications.       Medical presentations were related to chest pain, right knee pain, withdrawing from meth, asthma exacerbation, fecal impaction, abdominal pain, toe or leg pain, dyspnea, SOB, bilateral leg edema, acute Saddle PE, acute respiratory failure.          ED Recommendations: Following medical evaluation and treatment, allow pt time for de-escalation and to establish acute risk versus pt's chronic behavioral disturbances. Please contact pt's [facility] ACT team to develop a safe discharge plan for pt to return home at [phone]. Pt should be contacting her ACT team when in Crisis and formulating an action plan to include distress tolerance coping skills.   Pt should continue her medical treatment with her multiple medical speciality providers.    Home Medication Regimen: see most recent documentation in Epic, you can also verify pt's medications with her ACT team.        Recent Medical Work Ups:  (include Psych testing or ECT)     Comprehensive  labs - ,   CT's -   Xrays -   VAS -   ECG - ,  Inpatient Recommendations: Collaborate with pt's community resources to develop a comprehensive discharge plan.           Outpatient recommendations: Pt should continue her medical and mental health care with her community providers and take her medications as prescribed.        Situation/Relevant Background Info:  Pt is a 53 year old female with a diagnosis of Schizoaffective Disorder/ Bipolar type, Borderline Personality Disorder and a past history of meth abuse (clean since 2023 but uses medical marijuana) and numerous behavioral health hospitalizations.     Pt lives alone in an apartment that is close to where her father resides and has the [facility] team. Pt's mother  a few years ago in  and pt reports this as a difficult issue for her.   Pt has experienced various medical issues in  and appears to be very preoccupied with her medical conditions, perceived medical issues and treatment and becomes frustrated if she feels she is not obtaining the medical care she needs in a timely manner. Pt has displayed demanding and agitated behaviors while on behavioral health units.  Pt was active in going to see her father in the community and helping him as well as working part-time approx 21 hours per week. It is unknown if pt is still working. Pt appears to have poor distress tolerance and impulsivity. Pt appears to want to control her discharge date from inpatient units.  Pt has multiple medical speciality providers in addition to her PCP office: [facility], [facility], [facility], [facility], [facility],  Physical Therapy,  [facility].            Diagnoses/Significant Problems (Medical & Psychiatric):    Schizoaffective disorder, bipolar type, Borderline Personality Disorder  Active Problems:    Benign essential hypertension    Edema    Hypothyroidism    MAG    Overactive bladder    Sjogren's  syndrome (HCC)    COPD (chronic obstructive pulmonary disease) (HCC)    Medical clearance for psychiatric admission    History of pulmonary embolism    Ingrown toenail            Drivers of repeated utilization:   preoccupation with medical concerns and issues, personality disordered behaviors, limited to poor coping skills, impulsivity, secondary gains from ED visits and hospitalizations.                                                Existing Community Resources & Supports:                 father Luigi Mason - [phone]     Patient Medical & Psychiatric Care Team:  [facility] team - [phone]  [facility] PCP - [phone]   [facility]  [facility]  [facility]  [facility]  [facility]  [facility]    Care plan date: 04/01/24                   Author:  Dennise Napier RN                 Date reviewed with patient:

## 2024-04-03 ENCOUNTER — OFFICE VISIT (OUTPATIENT)
Dept: HEMATOLOGY ONCOLOGY | Facility: CLINIC | Age: 54
End: 2024-04-03
Payer: MEDICARE

## 2024-04-03 ENCOUNTER — TELEPHONE (OUTPATIENT)
Age: 54
End: 2024-04-03

## 2024-04-03 ENCOUNTER — APPOINTMENT (OUTPATIENT)
Dept: LAB | Facility: CLINIC | Age: 54
End: 2024-04-03
Payer: MEDICARE

## 2024-04-03 ENCOUNTER — TELEPHONE (OUTPATIENT)
Dept: HEMATOLOGY ONCOLOGY | Facility: CLINIC | Age: 54
End: 2024-04-03

## 2024-04-03 ENCOUNTER — TELEPHONE (OUTPATIENT)
Dept: FAMILY MEDICINE CLINIC | Facility: CLINIC | Age: 54
End: 2024-04-03

## 2024-04-03 VITALS
OXYGEN SATURATION: 96 % | RESPIRATION RATE: 17 BRPM | BODY MASS INDEX: 50.02 KG/M2 | SYSTOLIC BLOOD PRESSURE: 136 MMHG | HEIGHT: 64 IN | DIASTOLIC BLOOD PRESSURE: 82 MMHG | WEIGHT: 293 LBS | HEART RATE: 85 BPM | TEMPERATURE: 97.6 F

## 2024-04-03 DIAGNOSIS — Z86.711 HISTORY OF PULMONARY EMBOLISM: ICD-10-CM

## 2024-04-03 DIAGNOSIS — Z51.81 ANTICOAGULATION MANAGEMENT ENCOUNTER: Primary | ICD-10-CM

## 2024-04-03 DIAGNOSIS — Z79.01 ANTICOAGULATION MANAGEMENT ENCOUNTER: Primary | ICD-10-CM

## 2024-04-03 DIAGNOSIS — Z79.01 ANTICOAGULATION MANAGEMENT ENCOUNTER: ICD-10-CM

## 2024-04-03 DIAGNOSIS — Z51.81 ANTICOAGULATION MANAGEMENT ENCOUNTER: ICD-10-CM

## 2024-04-03 DIAGNOSIS — Z86.718 HISTORY OF DVT (DEEP VEIN THROMBOSIS): ICD-10-CM

## 2024-04-03 LAB
INR PPP: 2.48 (ref 0.84–1.19)
PROTHROMBIN TIME: 26.4 SECONDS (ref 11.6–14.5)

## 2024-04-03 PROCEDURE — 36415 COLL VENOUS BLD VENIPUNCTURE: CPT

## 2024-04-03 PROCEDURE — 85610 PROTHROMBIN TIME: CPT

## 2024-04-03 PROCEDURE — 99213 OFFICE O/P EST LOW 20 MIN: CPT

## 2024-04-03 RX ORDER — WARFARIN SODIUM 5 MG/1
5 TABLET ORAL
Qty: 90 TABLET | Refills: 1 | Status: SHIPPED | OUTPATIENT
Start: 2024-04-03 | End: 2024-09-30

## 2024-04-03 NOTE — PROGRESS NOTES
HEMATOLOGY / ONCOLOGY CLINIC FOLLOW UP NOTE    Primary Care Provider: JHONATAN Armando  Referring Provider:    MRN: [id number]  : 1970    Reason for Encounter: Coumadin management           Interval History: Patient presents to the office for Coumadin management for saddle PE on 2023 and left lower extremity DVT.  Patient has a PMH significant for suicidal ideations and attempts, major depressive disorder, hypothyroidism, Yan's esophagus, GERD, COPD, hypertension, chronic migraines, osteoarthritis, tremors, schizoaffective disorder bipolar type.  Patient has a long history of substance abuse, reports she has been clean since her last office visit.  She is currently working 2 days a week.    Patient was placed on Coumadin due to interaction with her psychiatric medication to control.     Since her last office visit, patient has had multiple hospitalizations.  She is following with the program post recent hospitalization and reports her mental health to be stable at this time.  Patient denies any increased bruising. Patient denies abnormal bleeding: epistaxis, gingival bleeding, hematuria, dark tarry stools.  No fevers, increased fatigue, frequent infections, drenching night sweats, decreased appetite or unintentional weight loss.    We have made several adjustments since November to her Coumadin dose to maintain an INR between 2 and 3.  She is currently on 5 mg Coumadin with weekly PT/INR checks.  She obtained her lab prior to the office visit, it is still pending.      REVIEW OF SYSTEMS:  Please note that a 14-point review of systems was performed to include Constitutional, HEENT, Respiratory, CVS, GI, , Musculoskeletal, Integumentary, Neurologic, Rheumatologic, Endocrinologic, Psychiatric, Lymphatic, and Hematologic/Oncologic systems were reviewed and are negative unless otherwise stated in HPI. Positive and negative findings pertinent to this evaluation are incorporated into the history of  present illness.      ECOG PS: 1    PROBLEM LIST:  Patient Active Problem List   Diagnosis    Yan's esophagus determined by biopsy    Benign essential hypertension    Schizoaffective disorder, bipolar type ([facility])    Chronic low back pain    Edema    Family history of renal cancer    Hypothyroidism    MAG (obstructive sleep apnea)    Overactive bladder    Urinary incontinence    Major depressive disorder    Sjogren's syndrome ([facility])    COPD (chronic obstructive pulmonary disease) ([facility])    Mixed hyperlipidemia    BMI 45.0-49.9, adult ([facility])    Memory difficulties    History of COVID-19    Obesity, morbid, BMI 50 or higher ([facility])    Medical clearance for psychiatric admission    Chronic tension-type headache, intractable    Potential for cognitive impairment    Mood disorder ([facility])    Substance abuse ([facility])    Cognitive impairment    Contusion of elbow    Diarrhea    Ecchymosis    Anxiety    Borderline personality disorder ([facility])    Platelets decreased ([facility])    Knee pain, right    Suicidal deliberate poisoning ([facility])    Intentional self-harm by unspecified sharp object, sequela ([facility])    Chronic eczematous otitis externa of both ears    Chronic migraine without aura without status migrainosus, not intractable    Bilateral leg edema    Cervicalgia    History of pulmonary embolism    Elevated troponin    Tremors of nervous system    Intentional overdose ([facility])    Polysubstance abuse ([facility])    Restless leg syndrome    Acute saddle pulmonary embolism ([facility])    Acute deep vein thrombosis of lower leg, left ([facility])    Lymphedema    Toxic encephalopathy    Grief    History of DVT (deep vein thrombosis)    Ingrown toenail    Excessive daytime sleepiness    Dyskinesia, drug-induced       Assessment / Plan: At this time, we discussed due to her multiple other physicians and adjustments to her Coumadin, we will continue to check her INR weekly.  Hopefully once we get it stabilized we can go to biweekly and hopefully monthly at some time.   Agreeable with plan.    I will see her back in 6 months.  She is aware to contact me for any additional questions/concerns or worsening symptoms.        I spent 20 minutes on chart review, face to face counseling time, coordination of care and documentation.    Past Medical History:   has a past medical history of Anxiety, Arthritis, Asthma, Yan's esophagus, Bipolar affective disorder ([facility]), Cannabis abuse with unspecified 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[facility]), Chronic pain disorder, COPD (chronic obstructive pulmonary disease) ([facility]), CPAP (continuous positive airway pressure) dependence, DDD (degenerative disc disease), lumbar (04/09/2015), Degenerative disc disease at L5-S1 level, Deliberate self-cutting, Depression (09/16/2008), Disease of thyroid gland, MARTINEZ (dyspnea on exertion), Drug overdose (10/28/2008), Dysphagia, Dyspnea, Edema, Family history of blood clots, GERD (gastroesophageal reflux disease), Headache(784.0), Headache, tension-type, Hemorrhage of rectum and anus (10/02/2017), High blood pressure, History of COVID-19 (12/30/2020), Hyperlipidemia, Hypertension, Knee pain, bilateral, Medial epicondylitis of elbow, left (04/03/2018), Medial epicondylitis of right elbow (07/17/2023), Medical marijuana use, Memory loss, Migraines, Obesity, Overactive bladder, Primary osteoarthritis of both knees (08/08/2018), Pulmonary emboli ([facility]), Restrictive lung disease (02/01/2017), Sjogren's disease ([facility]), Sleep apnea, Stress incontinence, Suicidal ideations, Teeth missing, Tremor, Visual impairment, and Wears glasses.    PAST SURGICAL HISTORY:   has a past surgical history that includes REPAIR LACERATION (Left); Colonoscopy; TONSILECTOMY AND ADNOIDECTOMY; Esophagogastroduodenoscopy; Vein ligation (Bilateral); Elbow surgery (Left); Dilation and curettage of uterus; pr sphincterotomy anal division sphincter spx (N/A, 08/31/2018); Carpal tunnel release (Left); Knee arthroscopy (Bilateral); Cholecystectomy (05/2003); Foot  surgery (Left); [place] tooth extraction; pr rpr umbilical hrna 5 yrs/> reducible (N/A, 04/24/2019); Hysterectomy; Oophorectomy (Left, 10/2015); Reduction mammaplasty (Bilateral, 1999); Rotator cuff repair (Left); pr gastrocnemius recession (Left, 02/24/2021); US guided msk procedure (04/22/2021); Breast cyst excision (Right, 1989); Replacement total knee (Right); pr tnot elbow lateral/medial debride open tdn rpr (Left, 09/20/2022); Hernia repair; and Vascular surgery.    CURRENT MEDICATIONS  Current Outpatient Medications   Medication Sig Dispense Refill    atoMOXetine (STRATTERA) 60 mg capsule       buPROPion (WELLBUTRIN XL) 150 mg 24 hr tablet       Cholecalciferol (Vitamin D3) 125 MCG (5000 UT) CAPS       lurasidone (LATUDA) 40 mg tablet       metFORMIN (GLUCOPHAGE-XR) 750 mg 24 hr tablet Take 1 tablet (750 mg total) by mouth daily with breakfast 30 tablet 1    OXcarbazepine (TRILEPTAL) 300 mg tablet Take 300 mg by mouth every 12 (twelve) hours      paliperidone palmitate ER (INVEGA) 234 mg/1.5 mL IM injection 1.5 mL (234 mg total) by IM- Deltoid route every 4 weeks Do not start before February 28, 2024.      warfarin (COUMADIN) 5 mg tablet Take 1 tablet (5 mg total) by mouth daily 90 tablet 1    atoMOXetine (STRATTERA) 40 mg capsule Take 1 capsule (40 mg total) by mouth daily 30 capsule 1    buPROPion (FORFIVO XL) 450 MG 24 hr tablet Take 1 tablet (450 mg total) by mouth daily 30 tablet 1    cloNIDine (CATAPRES) 0.2 mg tablet Take 1 tablet (0.2 mg total) by mouth every 12 (twelve) hours 60 tablet 1    cyanocobalamin (VITAMIN B-12) 1000 MCG tablet Take 1 tablet (1,000 mcg total) by mouth daily 30 tablet 1    furosemide (LASIX) 20 mg tablet Take 1 tablet (20 mg total) by mouth daily 30 tablet 1    levothyroxine (Euthyrox) 125 mcg tablet Take 1 tablet (125 mcg total) by mouth daily in the early morning 30 tablet 1    losartan (COZAAR) 100 MG tablet Take 1 tablet (100 mg total) by mouth daily 30 tablet 1     "naltrexone (REVIA) 50 mg tablet Take 1 tablet (50 mg total) by mouth daily 30 tablet 1    OXcarbazepine (TRILEPTAL) 150 mg tablet Take 3 tablets (450 mg total) by mouth every 12 (twelve) hours 180 tablet 1    oxybutynin (DITROPAN) 5 mg tablet Take 1 tablet (5 mg total) by mouth 2 (two) times a day 60 tablet 1    pantoprazole (PROTONIX) 40 mg tablet Take 1 tablet (40 mg total) by mouth daily in the early morning 30 tablet 1    topiramate (TOPAMAX) 200 MG tablet Take 1 tablet (200 mg total) by mouth 2 (two) times a day 60 tablet 1     No current facility-administered medications for this visit.     [unfilled]    SOCIAL HISTORY:   reports that she quit smoking about 6 years ago. Her smoking use included cigarettes. She started smoking about 39 years ago. She has a 66 pack-year smoking history. She has never used smokeless tobacco. She reports that she does not currently use alcohol. She reports current drug use. Drugs: Marijuana and Methamphetamines.     FAMILY HISTORY:  family history includes Alcohol abuse in her sister; Arthritis in her mother; Asthma in her sister; Cancer in her mother; Colon cancer in her family, maternal uncle, maternal uncle, and paternal uncle; Depression in her mother; Diabetes in her mother; Kidney cancer in her mother; No Known Problems in her father, maternal grandfather, maternal grandmother, paternal grandfather, paternal grandmother, and sister; Psychiatric Illness in her mother; Stroke in her mother.     ALLERGIES:  is allergic to percocet [oxycodone-acetaminophen], povidone iodine, and chlorhexidine.      Physical Exam:  Vital Signs:   Visit Vitals  /82 (BP Location: Left arm, Patient Position: Sitting, Cuff Size: Adult)   Pulse 85   Temp 97.6 °F (36.4 °C)   Resp 17   Ht 5' 4\" (1.626 m)   Wt (!) 137 kg (302 lb)   SpO2 96%   BMI 51.84 kg/m²   OB Status Hysterectomy   Smoking Status Former   BSA 2.33 m²     Body mass index is 51.84 kg/m².  Body surface area is 2.33 meters " squared.    GEN: Alert, awake oriented x3, in no acute distress  HEENT- No pallor, icterus, cyanosis, no oral mucosal lesions,   LAD - no palpable cervical, clavicle, axillary, inguinal LAD  Heart- normal S1 S2, regular rate and rhythm, No murmur, rubs.   Lungs- clear breathing sound bilateral.   Abdomen- soft, Non tender, bowel sounds present  Extremities- No cyanosis, clubbing, edema  Neuro- No focal neurological deficit    Labs:  Lab Results   Component Value Date    WBC 7.52 01/29/2024    HGB 13.8 01/29/2024    HCT 43.4 01/29/2024    MCV 93 01/29/2024     01/29/2024     Lab Results   Component Value Date    SODIUM 138 01/29/2024    K 4.0 01/29/2024     01/29/2024    CO2 26 01/29/2024    AGAP 8 01/29/2024    BUN 13 01/29/2024    CREATININE 0.62 01/29/2024    GLUC 88 01/29/2024    GLUF 88 01/29/2024    CALCIUM 8.6 01/29/2024    AST 13 12/02/2023    ALT 13 12/02/2023    ALKPHOS 55 12/02/2023    TP 6.0 (L) 12/02/2023    TBILI 0.25 12/02/2023    EGFR 104 01/29/2024

## 2024-04-03 NOTE — TELEPHONE ENCOUNTER
Nurse from [facility] called on behalf of pt requesting medication refills on all of pt medications because she has two days' worth left. Called over to office, looking for earlier appointment to have Shameka take over prescribing pts routine medications.  Office staff spoke with Shameka, who states that because the medications are ordered by psychiatry, pt needs to follow up with them. Verbalized understanding and will attempt to follow up with hospital.

## 2024-04-03 NOTE — PROGRESS NOTES
Assessment:  1. Neck pain    2. Cervical spondylosis        Plan:  The patient has been experiencing moderate to severe axial spine pain that is causing functional deficit.  The pain has been present for at least 3 months and is not improving with conservative care.  Currently the patient is not experiencing any radicular features nor neurogenic claudication.  Non-facet pathology has been ruled out on clinical evaluation. We will schedule the patient for left C4-6 medial branch blocks with intention of moving forward towards radiofrequency ablation if there is an appropriate diagnostic response. The initial blocks will be performed with 2% lidocaine and if an appropriate response is obtained upon review of the patient's pain diary, a confirmatory block will be scheduled.  Complete risks and benefits including bleeding, infection, tissue reaction, nerve injury and allergic reaction were discussed. The patient was agreeable and verbalized an understanding  Will avoid NSAIDs secondary to anticoagulation  Patient may continue Tylenol as needed  Continue with acupuncture and home exercise program  Will avoid opioid medication secondary to history of substance abuse  Follow-up pending results of blocks or sooner if needed    History of Present Illness:    The patient is a 53 y.o. female with a history of Yan's esophagus, COPD, MAG, substance abuse, significant psychiatric history, and history of pulmonary embolism currently on anticoagulation last seen on 08/17/2023  who presents for a follow up office visit in regards to chronic left-sided neck pain which is nonradiating into the upper extremities.  She denies bowel or bladder incontinence or balance issues.    MRI of the cervical spine from March 21, 2024 reveals mild left facet hypertrophy changes at C3-4, C5-6, and C6-7.  There is also mild left foraminal narrowing at C6-7.  All other levels are unremarkable.  Patient has attempted chiropractic therapy in the past  "which worsened her complaints.  She does still continue with acupuncture therapy for migraines and neck pain.  she takes Tylenol as needed without much relief.  She is unable to take NSAIDs secondary to anticoagulation    The patient rates her pain a 7 out of 10 on the numeric pain rating scale.  Pain is constant in the evening and it is described as throbbing, cramping, pressure-like and pins-and-needles    I have personally reviewed and/or updated the patient's past medical history, past surgical history, family history, social history, current medications, allergies, and vital signs today.       Review of Systems:    Review of Systems   Respiratory:  Negative for shortness of breath.    Cardiovascular:  Negative for chest pain.   Gastrointestinal:  Negative for constipation, diarrhea, nausea and vomiting.   Musculoskeletal:  Positive for back pain. Negative for arthralgias, gait problem, joint swelling and myalgias.   Skin:  Negative for rash.   Neurological:  Negative for dizziness, seizures and weakness.   All other systems reviewed and are negative.        Past Medical History:   Diagnosis Date    Anxiety     Arthritis     oa cassandra knees    Asthma     good control- no medications    Yan's esophagus     Bipolar affective disorder (HCC)     Cannabis abuse with unspecified 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HCC)     Chronic pain disorder     lumbar    COPD (chronic obstructive pulmonary disease) (Formerly [facility])     CPAP (continuous positive airway pressure) dependence     DDD (degenerative disc disease), lumbar 04/09/2015    Degenerative disc disease at L5-S1 level     Deliberate self-cutting     9/15/22per pt has not done recently-- does see a therapist and psychiatrist regularly    Depression 09/16/2008    Disease of thyroid gland     hypo    MARTINEZ (dyspnea on exertion)     per pt \"has had this with exertion and not new\"    Drug overdose 10/28/2008    suicide attempt and again drug overdose 7/2022-- [facility]-Medical floor and than " transferred to [facility] Psych    Dysphagia     Dyspnea     Edema     BLE    Family history of blood clots     GERD (gastroesophageal reflux disease)     Headache(784.0)     Headache, tension-type     Hemorrhage of rectum and anus 10/02/2017    Formatting of this note might be different from the original.  Added automatically from request for surgery [id number]    High blood pressure     History of COVID-19 12/30/2020    Symptoms started on 12/30/2020 and then got worse.  Today she is feeling a little bit better.  She is a candidate for monoclonal antibody infusion and set for 1/6/21 @ 1pm at [facility]. 01/07/21 - telemedicine -     Hyperlipidemia     Hypertension     Knee pain, bilateral     Especially right    Medial epicondylitis of elbow, left 04/03/2018    Formatting of this note might be different from the original.  Added automatically from request for surgery [id number]    Medial epicondylitis of right elbow 07/17/2023    Medical marijuana use     Memory loss     Migraines     Obesity     Overactive bladder     Primary osteoarthritis of both knees 08/08/2018    Pulmonary emboli (HCC)     Restrictive lung disease 02/01/2017    Last Assessment & Plan:   Formatting of this note might be different from the original.  I reviewed this problem throughout the rest of the note. Please refer to the other assessments for details.    Sjogren's disease (HCC)     Sleep apnea     Stress incontinence     Suicidal ideations     Teeth missing     Tremor     per pt Tremors of Right Leg and both Arms    Visual impairment     Wears glasses        Past Surgical History:   Procedure Laterality Date    BREAST CYST EXCISION Right 1989    CARPAL TUNNEL RELEASE Left     CHOLECYSTECTOMY  05/2003    Laparoscopic    COLONOSCOPY      01/12/2009    DILATION AND CURETTAGE OF UTERUS      ELBOW SURGERY Left     x2. No hardware    ESOPHAGOGASTRODUODENOSCOPY      FOOT SURGERY Left     Plantar fasciotomy    HERNIA REPAIR      HYSTERECTOMY       laporoscopic, partial    KNEE ARTHROSCOPY Bilateral     OOPHORECTOMY Left 10/2015    ND GASTROCNEMIUS RECESSION Left 02/24/2021    Procedure: RECESSION GASTROC OPEN;  Surgeon: Nomi Yang DPM;  Location:  MAIN OR;  Service: Podiatry    ND RPR UMBILICAL HRNA 5 YRS/> REDUCIBLE N/A 04/24/2019    Procedure: REPAIR HERNIA UMBILICAL LAPAROSCOPIC W/ ROBOTICS;  Surgeon: Anahi Colon MD;  Location[facility];  Service: General    ND SPHINCTEROTOMY ANAL DIVISION SPHINCTER SPX N/A 08/31/2018    Procedure: EUA, LEFT PARTIAL INTERNAL SPHINCTEROTOMY;  Surgeon: Tien Reyes MD;  Location:  MAIN OR;  Service: Colorectal    ND TNOT ELBOW LATERAL/MEDIAL DEBRIDE OPEN TDN RPR Left 09/20/2022    Procedure: RELEASE EPICONDYLAR ELBOW MEDIAL;  Surgeon: Kyree Winkler MD;  Location: [facility] MAIN OR;  Service: Orthopedics    REDUCTION MAMMAPLASTY Bilateral 1999    REPAIR LACERATION Left     left hand-5/18/2009    REPLACEMENT TOTAL KNEE Right     ROTATOR CUFF REPAIR Left     TONSILECTOMY AND ADNOIDECTOMY      US GUIDED MSK PROCEDURE  04/22/2021    VASCULAR SURGERY      VEIN LIGATION Bilateral     WISDOM TOOTH EXTRACTION         Family History   Problem Relation Age of Onset    Kidney cancer Mother     Diabetes Mother     Depression Mother     Stroke Mother     Arthritis Mother     Cancer Mother     Psychiatric Illness Mother     No Known Problems Father     No Known Problems Sister     No Known Problems Maternal Grandmother     No Known Problems Maternal Grandfather     No Known Problems Paternal Grandmother     No Known Problems Paternal Grandfather     Colon cancer Maternal Uncle     Colon cancer Maternal Uncle     Colon cancer Paternal Uncle     Colon cancer Family     Alcohol abuse Sister     Asthma Sister        Social History     Occupational History    Not on file   Tobacco Use    Smoking status: Former     Current packs/day: 0.00     Average packs/day: 2.0 packs/day for 33.0 years (66.0 ttl pk-yrs)     Types:  Cigarettes     Start date: 1985     Quit date: 2018     Years since quittin.2    Smokeless tobacco: Never    Tobacco comments:     Started at age 16.   Vaping Use    Vaping status: Never Used   Substance and Sexual Activity    Alcohol use: Not Currently     Comment: Recovering alcoholic    Drug use: Yes     Types: Marijuana, Methamphetamines     Comment: 2 months off meth    Sexual activity: Not Currently     Partners: Male     Comment: defer         Current Outpatient Medications:     atoMOXetine (STRATTERA) 60 mg capsule, , Disp: , Rfl:     buPROPion (WELLBUTRIN XL) 150 mg 24 hr tablet, , Disp: , Rfl:     Cholecalciferol (Vitamin D3) 125 MCG (5000 UT) CAPS, , Disp: , Rfl:     lurasidone (LATUDA) 40 mg tablet, , Disp: , Rfl:     metFORMIN (GLUCOPHAGE-XR) 750 mg 24 hr tablet, Take 1 tablet (750 mg total) by mouth daily with breakfast, Disp: 30 tablet, Rfl: 1    OXcarbazepine (TRILEPTAL) 300 mg tablet, Take 300 mg by mouth every 12 (twelve) hours, Disp: , Rfl:     paliperidone palmitate ER (INVEGA) 234 mg/1.5 mL IM injection, 1.5 mL (234 mg total) by IM- Deltoid route every 4 weeks Do not start before 2024., Disp: , Rfl:     warfarin (COUMADIN) 5 mg tablet, Take 1 tablet (5 mg total) by mouth daily, Disp: 90 tablet, Rfl: 1    atoMOXetine (STRATTERA) 40 mg capsule, Take 1 capsule (40 mg total) by mouth daily, Disp: 30 capsule, Rfl: 1    buPROPion (FORFIVO XL) 450 MG 24 hr tablet, Take 1 tablet (450 mg total) by mouth daily, Disp: 30 tablet, Rfl: 1    cloNIDine (CATAPRES) 0.2 mg tablet, Take 1 tablet (0.2 mg total) by mouth every 12 (twelve) hours, Disp: 60 tablet, Rfl: 1    cyanocobalamin (VITAMIN B-12) 1000 MCG tablet, Take 1 tablet (1,000 mcg total) by mouth daily, Disp: 30 tablet, Rfl: 1    furosemide (LASIX) 20 mg tablet, Take 1 tablet (20 mg total) by mouth daily, Disp: 30 tablet, Rfl: 1    levothyroxine (Euthyrox) 125 mcg tablet, Take 1 tablet (125 mcg total) by mouth daily in the early  "morning, Disp: 30 tablet, Rfl: 1    losartan (COZAAR) 100 MG tablet, Take 1 tablet (100 mg total) by mouth daily, Disp: 30 tablet, Rfl: 1    naltrexone (REVIA) 50 mg tablet, Take 1 tablet (50 mg total) by mouth daily, Disp: 30 tablet, Rfl: 1    OXcarbazepine (TRILEPTAL) 150 mg tablet, Take 3 tablets (450 mg total) by mouth every 12 (twelve) hours, Disp: 180 tablet, Rfl: 1    oxybutynin (DITROPAN) 5 mg tablet, Take 1 tablet (5 mg total) by mouth 2 (two) times a day, Disp: 60 tablet, Rfl: 1    pantoprazole (PROTONIX) 40 mg tablet, Take 1 tablet (40 mg total) by mouth daily in the early morning, Disp: 30 tablet, Rfl: 1    topiramate (TOPAMAX) 200 MG tablet, Take 1 tablet (200 mg total) by mouth 2 (two) times a day, Disp: 60 tablet, Rfl: 1    Allergies   Allergen Reactions    Percocet [Oxycodone-Acetaminophen] Headache     Severe headaches   (denies issues with Tylenol)    Povidone Iodine Rash     Unsure if betadine or gauze post surgical. Got rash on leg.   Has  Had itchy rashes after every surgery prep and IV insertion    Chlorhexidine Rash     Per pt \"able to use the liquid soap--thinkd reaction from the sponges or wipes\"       Physical Exam:    /83   Pulse 67   Ht 5' 4\" (1.626 m)   Wt (!) 137 kg (301 lb)   BMI 51.67 kg/m²     Constitutional:normal, well developed, well nourished, alert, in no distress and non-toxic and no overt pain behavior.  Eyes:anicteric  HEENT:grossly intact  Neck:supple, symmetric, trachea midline and no masses   Pulmonary:even and unlabored  Cardiovascular:No edema or pitting edema present  Skin:Normal without rashes or lesions and well hydrated  Psychiatric:Mood and affect appropriate  Neurologic:Cranial Nerves II-XII grossly intact  Musculoskeletal:normal gait. Positive cervical facet loading maneuvers      Imaging  FL spine and pain procedure    (Results Pending)       Narrative & Impression  MRI CERVICAL SPINE WITHOUT CONTRAST     INDICATION: M54.2: Cervicalgia.     COMPARISON: " 8/25/2023     TECHNIQUE:  Multiplanar, multisequence imaging of the cervical spine was performed. .        IMAGE QUALITY: Somewhat limited by motion degradation.     FINDINGS:     ALIGNMENT:  Normal alignment of the cervical spine.  No compression fracture.  No subluxation.  No scoliosis.     MARROW SIGNAL:  Normal marrow signal is identified within the visualized bony structures.  No discrete marrow lesion.     CERVICAL AND VISUALIZED THORACIC CORD:  Normal signal within the visualized cord.     PREVERTEBRAL AND PARASPINAL SOFT TISSUES:  Normal.     VISUALIZED POSTERIOR FOSSA:  The visualized posterior fossa demonstrates no abnormal signal.     CERVICAL DISC SPACES:     C2-C3:  Normal.     C3-C4: Mild left uncovertebral hypertrophy without significant stenosis..     C4-C5:  Normal.     C5-C6: Limited by motion with small suspected disc osteophyte complex but no significant stenosis.     C6-C7: Diffuse disc bulge at this level with mild facet arthrosis resulting in mild left foraminal narrowing. Right neuroforamen remains patent.     C7-T1:  Normal.     UPPER THORACIC DISC SPACES:  Normal.     OTHER FINDINGS:  None.     IMPRESSION:  Mild C6-7 disc bulge and facet arthrosis resulting in mild left foraminal narrowing. Correlate clinically for left C7 radiculopathy. Remaining disc levels are widely patent.    Orders Placed This Encounter   Procedures    FL spine and pain procedure

## 2024-04-03 NOTE — TELEPHONE ENCOUNTER
Call from [facility] [[email or web address]]   nurse liason for  Blanca is requesting all medication be filled  [facility] will only fill the medications she prescribes-- psychiatry would need to fill all psych meds  message will be relayed to Blanca's nurse by [facility] [[email or web address]]

## 2024-04-04 ENCOUNTER — OFFICE VISIT (OUTPATIENT)
Dept: PAIN MEDICINE | Facility: CLINIC | Age: 54
End: 2024-04-04
Payer: MEDICARE

## 2024-04-04 VITALS
HEART RATE: 67 BPM | DIASTOLIC BLOOD PRESSURE: 83 MMHG | BODY MASS INDEX: 50.02 KG/M2 | WEIGHT: 293 LBS | SYSTOLIC BLOOD PRESSURE: 147 MMHG | HEIGHT: 64 IN

## 2024-04-04 DIAGNOSIS — M47.812 CERVICAL SPONDYLOSIS: ICD-10-CM

## 2024-04-04 DIAGNOSIS — R73.03 PREDIABETES: ICD-10-CM

## 2024-04-04 DIAGNOSIS — M54.2 NECK PAIN: Primary | ICD-10-CM

## 2024-04-04 PROCEDURE — G2211 COMPLEX E/M VISIT ADD ON: HCPCS | Performed by: NURSE PRACTITIONER

## 2024-04-04 PROCEDURE — 99214 OFFICE O/P EST MOD 30 MIN: CPT | Performed by: NURSE PRACTITIONER

## 2024-04-04 NOTE — TELEPHONE ENCOUNTER
Appointment scheduled with provider.    Reason: Office Visit    Symptoms: Medication Review    Provider: JHONATAN Armando    Date/Time: Thursday 4/4/2024 at 2:00 pm

## 2024-04-11 ENCOUNTER — APPOINTMENT (OUTPATIENT)
Dept: LAB | Facility: CLINIC | Age: 54
End: 2024-04-11
Payer: MEDICARE

## 2024-04-11 ENCOUNTER — TELEPHONE (OUTPATIENT)
Dept: HEMATOLOGY ONCOLOGY | Facility: CLINIC | Age: 54
End: 2024-04-11

## 2024-04-11 DIAGNOSIS — Z51.81 ANTICOAGULATION MANAGEMENT ENCOUNTER: ICD-10-CM

## 2024-04-11 DIAGNOSIS — Z79.01 ANTICOAGULATION MANAGEMENT ENCOUNTER: ICD-10-CM

## 2024-04-11 DIAGNOSIS — Z86.711 HISTORY OF PULMONARY EMBOLISM: ICD-10-CM

## 2024-04-11 LAB
INR PPP: 2.33 (ref 0.84–1.19)
PROTHROMBIN TIME: 25.1 SECONDS (ref 11.6–14.5)

## 2024-04-11 PROCEDURE — 85610 PROTHROMBIN TIME: CPT

## 2024-04-11 PROCEDURE — 36415 COLL VENOUS BLD VENIPUNCTURE: CPT

## 2024-04-11 NOTE — TELEPHONE ENCOUNTER
Spoke with Blanca, informed her that her INR is 2.33, to continue taking Coumadin 5 mg daily.  Patient voiced understanding.  She will obtain INR next week, I will call her with the results.

## 2024-04-17 ENCOUNTER — APPOINTMENT (OUTPATIENT)
Dept: LAB | Facility: CLINIC | Age: 54
End: 2024-04-17
Payer: MEDICARE

## 2024-04-17 DIAGNOSIS — Z51.81 ANTICOAGULATION MANAGEMENT ENCOUNTER: ICD-10-CM

## 2024-04-17 DIAGNOSIS — Z86.711 HISTORY OF PULMONARY EMBOLISM: ICD-10-CM

## 2024-04-17 DIAGNOSIS — Z79.01 ANTICOAGULATION MANAGEMENT ENCOUNTER: ICD-10-CM

## 2024-04-17 LAB
INR PPP: 2.1 (ref 0.84–1.19)
PROTHROMBIN TIME: 23.2 SECONDS (ref 11.6–14.5)

## 2024-04-17 PROCEDURE — 85610 PROTHROMBIN TIME: CPT

## 2024-04-17 PROCEDURE — 36415 COLL VENOUS BLD VENIPUNCTURE: CPT

## 2024-04-18 ENCOUNTER — TELEPHONE (OUTPATIENT)
Dept: HEMATOLOGY ONCOLOGY | Facility: CLINIC | Age: 54
End: 2024-04-18

## 2024-04-18 NOTE — TELEPHONE ENCOUNTER
Spoke with Blanca to review her INR, which is 2.10.  Recommend she take Coumadin 7 mg today and 7 mg tomorrow (Friday).  Then go back down to 5 mg daily starting Saturday.  Patient voiced understanding.

## 2024-04-24 ENCOUNTER — TELEPHONE (OUTPATIENT)
Dept: HEMATOLOGY ONCOLOGY | Facility: CLINIC | Age: 54
End: 2024-04-24

## 2024-04-24 ENCOUNTER — APPOINTMENT (OUTPATIENT)
Dept: LAB | Facility: CLINIC | Age: 54
End: 2024-04-24
Payer: MEDICARE

## 2024-04-24 ENCOUNTER — HOSPITAL ENCOUNTER (OUTPATIENT)
Dept: RADIOLOGY | Facility: CLINIC | Age: 54
Discharge: HOME | End: 2024-04-24
Payer: MEDICARE

## 2024-04-24 VITALS
DIASTOLIC BLOOD PRESSURE: 88 MMHG | SYSTOLIC BLOOD PRESSURE: 139 MMHG | RESPIRATION RATE: 18 BRPM | OXYGEN SATURATION: 100 % | HEART RATE: 80 BPM | TEMPERATURE: 97.3 F

## 2024-04-24 DIAGNOSIS — Z51.81 ANTICOAGULATION MANAGEMENT ENCOUNTER: ICD-10-CM

## 2024-04-24 DIAGNOSIS — Z86.711 HISTORY OF PULMONARY EMBOLISM: ICD-10-CM

## 2024-04-24 DIAGNOSIS — M47.812 CERVICAL SPONDYLOSIS: ICD-10-CM

## 2024-04-24 DIAGNOSIS — Z79.01 ANTICOAGULATION MANAGEMENT ENCOUNTER: ICD-10-CM

## 2024-04-24 LAB
INR PPP: 2.37 (ref 0.84–1.19)
PROTHROMBIN TIME: 25.5 SECONDS (ref 11.6–14.5)

## 2024-04-24 PROCEDURE — 85610 PROTHROMBIN TIME: CPT

## 2024-04-24 PROCEDURE — 36415 COLL VENOUS BLD VENIPUNCTURE: CPT

## 2024-04-24 PROCEDURE — 64491 INJ PARAVERT F JNT C/T 2 LEV: CPT | Performed by: ANESTHESIOLOGY

## 2024-04-24 PROCEDURE — 64490 INJ PARAVERT F JNT C/T 1 LEV: CPT | Performed by: ANESTHESIOLOGY

## 2024-04-24 RX ORDER — LIDOCAINE HYDROCHLORIDE 10 MG/ML
3 INJECTION, SOLUTION EPIDURAL; INFILTRATION; INTRACAUDAL; PERINEURAL ONCE
Status: COMPLETED | OUTPATIENT
Start: 2024-04-24 | End: 2024-04-24

## 2024-04-24 RX ADMIN — LIDOCAINE HYDROCHLORIDE 1.5 ML: 20 INJECTION, SOLUTION EPIDURAL; INFILTRATION; INTRACAUDAL; PERINEURAL at 09:27

## 2024-04-24 RX ADMIN — LIDOCAINE HYDROCHLORIDE 3 ML: 10 INJECTION, SOLUTION EPIDURAL; INFILTRATION; INTRACAUDAL; PERINEURAL at 09:26

## 2024-04-24 NOTE — TELEPHONE ENCOUNTER
Left a message for Blanca letting her know that her INR is 2.37, which is therapeutic as her goal is between 2 and 3.  Recommend that she continue Coumadin 5 mg daily and we will recheck her counts next week.  Direct team's number provided for call back if further questions.

## 2024-04-24 NOTE — H&P
"History of Present Illness: The patient is a 53 y.o. female who presents with complaints of neck pain.    Past Medical History:   Diagnosis Date    Anxiety     Arthritis     oa cassandra knees    Asthma     good control- no medications    Yan's esophagus     Bipolar affective disorder (HCC)     Cannabis abuse with unspecified 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HCC)     Chronic pain disorder     lumbar    COPD (chronic obstructive pulmonary disease) (HCC)     CPAP (continuous positive airway pressure) dependence     DDD (degenerative disc disease), lumbar 04/09/2015    Degenerative disc disease at L5-S1 level     Deliberate self-cutting     9/15/22per pt has not done recently-- does see a therapist and psychiatrist regularly    Depression 09/16/2008    Disease of thyroid gland     hypo    MARTINEZ (dyspnea on exertion)     per pt \"has had this with exertion and not new\"    Drug overdose 10/28/2008    suicide attempt and again drug overdose 7/2022--[facility]-Medical floor and than transferred to [facility] Psych    Dysphagia     Dyspnea     Edema     BLE    Family history of blood clots     GERD (gastroesophageal reflux disease)     Headache(784.0)     Headache, tension-type     Hemorrhage of rectum and anus 10/02/2017    Formatting of this note might be different from the original.  Added automatically from request for surgery [id number]    High blood pressure     History of COVID-19 12/30/2020    Symptoms started on 12/30/2020 and then got worse.  Today she is feeling a little bit better.  She is a candidate for monoclonal antibody infusion and set for 1/6/21 @ 1pm at [facility]. 01/07/21 - telemedicine -     Hyperlipidemia     Hypertension     Knee pain, bilateral     Especially right    Medial epicondylitis of elbow, left 04/03/2018    Formatting of this note might be different from the original.  Added automatically from request for surgery [id number]    Medial epicondylitis of right elbow 07/17/2023    Medical marijuana use     Memory loss "     Migraines     Obesity     Overactive bladder     Primary osteoarthritis of both knees 08/08/2018    Pulmonary emboli (HCC)     Restrictive lung disease 02/01/2017    Last Assessment & Plan:   Formatting of this note might be different from the original.  I reviewed this problem throughout the rest of the note. Please refer to the other assessments for details.    Sjogren's disease (HCC)     Sleep apnea     Stress incontinence     Suicidal ideations     Teeth missing     Tremor     per pt Tremors of Right Leg and both Arms    Visual impairment     Wears glasses        Past Surgical History:   Procedure Laterality Date    BREAST CYST EXCISION Right 1989    CARPAL TUNNEL RELEASE Left     CHOLECYSTECTOMY  05/2003    Laparoscopic    COLONOSCOPY      01/12/2009    DILATION AND CURETTAGE OF UTERUS      ELBOW SURGERY Left     x2. No hardware    ESOPHAGOGASTRODUODENOSCOPY      FOOT SURGERY Left     Plantar fasciotomy    HERNIA REPAIR      HYSTERECTOMY      laporoscopic, partial    KNEE ARTHROSCOPY Bilateral     OOPHORECTOMY Left 10/2015    LA GASTROCNEMIUS RECESSION Left 02/24/2021    Procedure: RECESSION GASTROC OPEN;  Surgeon: Nomi Yang DPM;  Location:  MAIN OR;  Service: Podiatry    LA RPR UMBILICAL HRNA 5 YRS/> REDUCIBLE N/A 04/24/2019    Procedure: REPAIR HERNIA UMBILICAL LAPAROSCOPIC W/ ROBOTICS;  Surgeon: Anahi Colon MD;  Location[facility];  Service: General    LA SPHINCTEROTOMY ANAL DIVISION SPHINCTER SPX N/A 08/31/2018    Procedure: EUA, LEFT PARTIAL INTERNAL SPHINCTEROTOMY;  Surgeon: Tien Reyes MD;  Location:  MAIN OR;  Service: Colorectal    LA TNOT ELBOW LATERAL/MEDIAL DEBRIDE OPEN TDN RPR Left 09/20/2022    Procedure: RELEASE EPICONDYLAR ELBOW MEDIAL;  Surgeon: Kyree Winkler MD;  [facility] MA[facility] OR;  Service: Orthopedics    REDUCTION MAMMAPLASTY Bilateral 1999    REPAIR LACERATION Left     left hand-5/18/2009    REPLACEMENT TOTAL KNEE Right     ROTATOR CUFF REPAIR Left      TONSILECTOMY AND ADNOIDECTOMY      US GUIDED MSK PROCEDURE  04/22/2021    VASCULAR SURGERY      VEIN LIGATION Bilateral     WISDOM TOOTH EXTRACTION           Current Outpatient Medications:     atoMOXetine (STRATTERA) 40 mg capsule, Take 1 capsule (40 mg total) by mouth daily, Disp: 30 capsule, Rfl: 1    atoMOXetine (STRATTERA) 60 mg capsule, , Disp: , Rfl:     buPROPion (FORFIVO XL) 450 MG 24 hr tablet, Take 1 tablet (450 mg total) by mouth daily, Disp: 30 tablet, Rfl: 1    buPROPion (WELLBUTRIN XL) 150 mg 24 hr tablet, , Disp: , Rfl:     Cholecalciferol (Vitamin D3) 125 MCG (5000 UT) capsule, Take 1 capsule (5,000 Units total) by mouth daily, Disp: 90 capsule, Rfl: 1    cloNIDine (CATAPRES) 0.2 mg tablet, Take 1 tablet (0.2 mg total) by mouth every 12 (twelve) hours, Disp: 60 tablet, Rfl: 1    cyanocobalamin (VITAMIN B-12) 1000 MCG tablet, Take 1 tablet (1,000 mcg total) by mouth daily, Disp: 90 tablet, Rfl: 1    furosemide (LASIX) 20 mg tablet, Take 1 tablet (20 mg total) by mouth daily, Disp: 90 tablet, Rfl: 1    levothyroxine (Euthyrox) 125 mcg tablet, Take 1 tablet (125 mcg total) by mouth daily in the early morning, Disp: 90 tablet, Rfl: 1    losartan (COZAAR) 100 MG tablet, Take 1 tablet (100 mg total) by mouth daily, Disp: 90 tablet, Rfl: 1    lurasidone (LATUDA) 40 mg tablet, , Disp: , Rfl:     metFORMIN (GLUCOPHAGE-XR) 750 mg 24 hr tablet, Take 1 tablet (750 mg total) by mouth daily with breakfast, Disp: 90 tablet, Rfl: 1    naltrexone (REVIA) 50 mg tablet, Take 1 tablet (50 mg total) by mouth daily, Disp: 30 tablet, Rfl: 1    OXcarbazepine (TRILEPTAL) 150 mg tablet, Take 3 tablets (450 mg total) by mouth every 12 (twelve) hours, Disp: 180 tablet, Rfl: 1    OXcarbazepine (TRILEPTAL) 300 mg tablet, Take 300 mg by mouth every 12 (twelve) hours, Disp: , Rfl:     oxybutynin (DITROPAN) 5 mg tablet, Take 1 tablet (5 mg total) by mouth 2 (two) times a day, Disp: 120 tablet, Rfl: 1    paliperidone palmitate ER  "(INVEGA) 234 mg/1.5 mL IM injection, 1.5 mL (234 mg total) by IM- Deltoid route every 4 weeks Do not start before February 28, 2024., Disp: , Rfl:     pantoprazole (PROTONIX) 40 mg tablet, Take 1 tablet (40 mg total) by mouth daily in the early morning, Disp: 90 tablet, Rfl: 1    topiramate (TOPAMAX) 200 MG tablet, Take 1 tablet (200 mg total) by mouth 2 (two) times a day, Disp: 60 tablet, Rfl: 1    warfarin (COUMADIN) 5 mg tablet, Take 1 tablet (5 mg total) by mouth daily, Disp: 90 tablet, Rfl: 1    Allergies   Allergen Reactions    Percocet [Oxycodone-Acetaminophen] Headache     Severe headaches   (denies issues with Tylenol)    Povidone Iodine Rash     Unsure if betadine or gauze post surgical. Got rash on leg.   Has  Had itchy rashes after every surgery prep and IV insertion    Chlorhexidine Rash     Per pt \"able to use the liquid soap--thinkd reaction from the sponges or wipes\"       Physical Exam:   Vitals:    04/24/24 0856   BP: 141/95   Pulse: 71   Resp: 18   Temp: (!) 97.3 °F (36.3 °C)   SpO2: 96%     General: Awake, Alert, Oriented x 3, Mood and affect appropriate  Respiratory: Respirations even and unlabored  Cardiovascular: Peripheral pulses intact; no edema  Musculoskeletal Exam: Normal gait    ASA Score: 3    Patient/Chart Verification  Patient ID Verified: Verbal  ID Band Applied: No  Consents Confirmed: Procedural  H&P( within 30 days) Verified: To be obtained in the Pre-Procedure area  Allergies Reviewed: Yes  Anticoag/NSAID held?: No  Currently on antibiotics?: No    Assessment:   1. Cervical spondylosis        Plan: Left C4-6 MBB    "

## 2024-04-24 NOTE — DISCHARGE INSTR - LAB

## 2024-05-01 ENCOUNTER — APPOINTMENT (OUTPATIENT)
Dept: LAB | Facility: CLINIC | Age: 54
End: 2024-05-01
Payer: MEDICARE

## 2024-05-01 DIAGNOSIS — Z79.01 ANTICOAGULATION MANAGEMENT ENCOUNTER: ICD-10-CM

## 2024-05-01 DIAGNOSIS — Z51.81 ANTICOAGULATION MANAGEMENT ENCOUNTER: ICD-10-CM

## 2024-05-01 DIAGNOSIS — Z86.711 HISTORY OF PULMONARY EMBOLISM: ICD-10-CM

## 2024-05-01 LAB
INR PPP: 2.56 (ref 0.84–1.19)
PROTHROMBIN TIME: 27 SECONDS (ref 11.6–14.5)

## 2024-05-01 PROCEDURE — 36415 COLL VENOUS BLD VENIPUNCTURE: CPT

## 2024-05-01 PROCEDURE — 85610 PROTHROMBIN TIME: CPT

## 2024-05-02 ENCOUNTER — TELEPHONE (OUTPATIENT)
Age: 54
End: 2024-05-02

## 2024-05-02 ENCOUNTER — TELEPHONE (OUTPATIENT)
Dept: GYNECOLOGIC ONCOLOGY | Facility: CLINIC | Age: 54
End: 2024-05-02

## 2024-05-02 DIAGNOSIS — M47.812 CERVICAL SPONDYLOSIS: Primary | ICD-10-CM

## 2024-05-02 NOTE — TELEPHONE ENCOUNTER
Spoke with Blanca and informed her that her INR is therapeutic, 2.56.  Recommend she continue Coumadin 5 mg daily and we will check her INR next week.  Patient voiced understanding.

## 2024-05-02 NOTE — TELEPHONE ENCOUNTER
Caller: Blanca     Doctor: Coco     Reason for call: Patient called to advise if pain diary was received I did advise patient we did received pain diary and patient would like a call back for next steps.    Call back#: [phone]

## 2024-05-02 NOTE — TELEPHONE ENCOUNTER
The patient had a favorable result to left C4-6 medial branch block #1.  Please call the patient to schedule medial branch block #2

## 2024-05-02 NOTE — TELEPHONE ENCOUNTER
Called spoke with patient  Scheduled procedure  Reviewed all instructions by phone upon scheduling  Mailed copy to home

## 2024-05-06 ENCOUNTER — OFFICE VISIT (OUTPATIENT)
Dept: FAMILY MEDICINE CLINIC | Facility: CLINIC | Age: 54
End: 2024-05-06
Payer: MEDICARE

## 2024-05-06 VITALS
HEIGHT: 66 IN | WEIGHT: 293 LBS | TEMPERATURE: 97.3 F | SYSTOLIC BLOOD PRESSURE: 140 MMHG | BODY MASS INDEX: 47.09 KG/M2 | DIASTOLIC BLOOD PRESSURE: 80 MMHG | OXYGEN SATURATION: 95 % | HEART RATE: 82 BPM

## 2024-05-06 DIAGNOSIS — M35.00 SJOGREN'S SYNDROME, WITH UNSPECIFIED ORGAN INVOLVEMENT (HCC): ICD-10-CM

## 2024-05-06 DIAGNOSIS — E03.9 ACQUIRED HYPOTHYROIDISM: Chronic | ICD-10-CM

## 2024-05-06 DIAGNOSIS — I10 BENIGN ESSENTIAL HYPERTENSION: Chronic | ICD-10-CM

## 2024-05-06 DIAGNOSIS — J44.9 CHRONIC OBSTRUCTIVE PULMONARY DISEASE, UNSPECIFIED COPD TYPE (HCC): Chronic | ICD-10-CM

## 2024-05-06 DIAGNOSIS — F19.10 SUBSTANCE ABUSE (HCC): ICD-10-CM

## 2024-05-06 DIAGNOSIS — G43.709 CHRONIC MIGRAINE WITHOUT AURA WITHOUT STATUS MIGRAINOSUS, NOT INTRACTABLE: ICD-10-CM

## 2024-05-06 DIAGNOSIS — M54.2 CERVICALGIA: ICD-10-CM

## 2024-05-06 DIAGNOSIS — I50.9 ACUTE HEART FAILURE, UNSPECIFIED HEART FAILURE TYPE (HCC): ICD-10-CM

## 2024-05-06 DIAGNOSIS — Z00.00 MEDICARE ANNUAL WELLNESS VISIT, SUBSEQUENT: Primary | ICD-10-CM

## 2024-05-06 DIAGNOSIS — F19.19: ICD-10-CM

## 2024-05-06 DIAGNOSIS — F33.2 SEVERE EPISODE OF RECURRENT MAJOR DEPRESSIVE DISORDER, WITHOUT PSYCHOTIC FEATURES (HCC): ICD-10-CM

## 2024-05-06 DIAGNOSIS — R60.0 BILATERAL LEG EDEMA: ICD-10-CM

## 2024-05-06 DIAGNOSIS — I89.0 LYMPHEDEMA: ICD-10-CM

## 2024-05-06 DIAGNOSIS — Z86.711 HISTORY OF PULMONARY EMBOLISM: ICD-10-CM

## 2024-05-06 DIAGNOSIS — F25.0 SCHIZOAFFECTIVE DISORDER, BIPOLAR TYPE (HCC): Chronic | ICD-10-CM

## 2024-05-06 DIAGNOSIS — L60.0 INGROWN TOENAIL: ICD-10-CM

## 2024-05-06 DIAGNOSIS — R73.03 PREDIABETES: ICD-10-CM

## 2024-05-06 DIAGNOSIS — F39 MOOD DISORDER (HCC): ICD-10-CM

## 2024-05-06 DIAGNOSIS — K22.70 BARRETT'S ESOPHAGUS DETERMINED BY BIOPSY: Chronic | ICD-10-CM

## 2024-05-06 DIAGNOSIS — E66.01 OBESITY, MORBID, BMI 50 OR HIGHER (HCC): ICD-10-CM

## 2024-05-06 DIAGNOSIS — M47.812 CERVICAL SPONDYLOSIS: ICD-10-CM

## 2024-05-06 DIAGNOSIS — N32.81 OVERACTIVE BLADDER: Chronic | ICD-10-CM

## 2024-05-06 DIAGNOSIS — E78.2 MIXED HYPERLIPIDEMIA: ICD-10-CM

## 2024-05-06 DIAGNOSIS — F41.9 ANXIETY: ICD-10-CM

## 2024-05-06 DIAGNOSIS — G24.01 DYSKINESIA, DRUG-INDUCED: ICD-10-CM

## 2024-05-06 DIAGNOSIS — G47.33 OSA (OBSTRUCTIVE SLEEP APNEA): Chronic | ICD-10-CM

## 2024-05-06 DIAGNOSIS — D69.6 PLATELETS DECREASED (HCC): ICD-10-CM

## 2024-05-06 DIAGNOSIS — R41.89 COGNITIVE IMPAIRMENT: ICD-10-CM

## 2024-05-06 DIAGNOSIS — F60.3 BORDERLINE PERSONALITY DISORDER (HCC): ICD-10-CM

## 2024-05-06 PROBLEM — Z91.89 POTENTIAL FOR COGNITIVE IMPAIRMENT: Status: RESOLVED | Noted: 2021-06-17 | Resolved: 2024-05-06

## 2024-05-06 PROBLEM — Z86.16 HISTORY OF COVID-19: Status: RESOLVED | Noted: 2020-12-30 | Resolved: 2024-05-06

## 2024-05-06 PROBLEM — F43.21 GRIEF: Status: RESOLVED | Noted: 2023-11-11 | Resolved: 2024-05-06

## 2024-05-06 PROBLEM — M25.561 KNEE PAIN, RIGHT: Status: RESOLVED | Noted: 2022-02-23 | Resolved: 2024-05-06

## 2024-05-06 PROBLEM — I82.4Z2 ACUTE DEEP VEIN THROMBOSIS OF LOWER LEG, LEFT (HCC): Status: RESOLVED | Noted: 2023-10-16 | Resolved: 2024-05-06

## 2024-05-06 PROBLEM — S50.00XA CONTUSION OF ELBOW: Status: RESOLVED | Noted: 2021-12-21 | Resolved: 2024-05-06

## 2024-05-06 PROBLEM — R79.89 ELEVATED TROPONIN: Status: RESOLVED | Noted: 2023-09-02 | Resolved: 2024-05-06

## 2024-05-06 PROBLEM — G92.9 TOXIC ENCEPHALOPATHY: Status: RESOLVED | Noted: 2023-11-08 | Resolved: 2024-05-06

## 2024-05-06 PROBLEM — R19.7 DIARRHEA: Status: RESOLVED | Noted: 2022-01-06 | Resolved: 2024-05-06

## 2024-05-06 PROBLEM — R58 ECCHYMOSIS: Status: RESOLVED | Noted: 2022-01-06 | Resolved: 2024-05-06
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
## 2024-05-06 PROBLEM — T65.92XA SUICIDAL DELIBERATE POISONING (HCC): Status: RESOLVED | Noted: 2022-07-13 | Resolved: 2024-05-06

## 2024-05-06 PROBLEM — R41.3 MEMORY DIFFICULTIES: Status: RESOLVED | Noted: 2020-08-06 | Resolved: 2024-05-06

## 2024-05-06 PROBLEM — T50.902A INTENTIONAL OVERDOSE (HCC): Status: RESOLVED | Noted: 2023-09-27 | Resolved: 2024-05-06

## 2024-05-06 LAB — SL AMB POCT HEMOGLOBIN AIC: 5.4 (ref ?–6.5)

## 2024-05-06 PROCEDURE — G0439 PPPS, SUBSEQ VISIT: HCPCS

## 2024-05-06 PROCEDURE — 83036 HEMOGLOBIN GLYCOSYLATED A1C: CPT

## 2024-05-06 RX ORDER — PILOCARPINE HYDROCHLORIDE 5 MG/1
5 TABLET, FILM COATED ORAL 3 TIMES DAILY
Qty: 270 TABLET | Refills: 0 | Status: SHIPPED | OUTPATIENT
Start: 2024-05-06

## 2024-05-06 NOTE — ASSESSMENT & PLAN NOTE
Generalized dyskinesias affecting both upper and lower extremities.   Patient is on Depakote and carbamazepine for her psychiatric comorbidities, which can cause tremors.    Psychiatrist decreased the Depakote and increasing her Trileptal.  Having a side-to-side tremor of her lower jaw when she opens her mouth ever since starting Invega however it is not significant enough to stop the medication as she feels this is helping her significantly.

## 2024-05-06 NOTE — ASSESSMENT & PLAN NOTE
Follows with neurology and s/p botox for cervicogenic migraine  Has been completing regular PT   Worsening since chiropractic manipulation in May.  Seen by pain management   Injections done and provided moderate relief of symptoms

## 2024-05-06 NOTE — PROGRESS NOTES
Assessment and Plan:     Problem List Items Addressed This Visit          Cardiovascular and Mediastinum    Benign essential hypertension (Chronic)     Today's /80  Current medication:  Losartan 100mg, Lasix 20mg, Clonidine 0.2mg BID  Continue current medication regimen   Advised patient on symptoms of hypotension & severe HTN  Limit salt-intake & caffeine. DASH diet discussed, minimize stress level  Weight reduction efforts via improved diet & increased exercise         RESOLVED: Acute heart failure, unspecified heart failure type (HCC)    Chronic migraine without aura without status migrainosus, not intractable     Follows with neurology  Current medications: Topamax 200mg BID  Continue current medication regimen             Respiratory    COPD (chronic obstructive pulmonary disease) (HCC) (Chronic)     Well controlled.   No medications needed at this time.          MAG (obstructive sleep apnea) (Chronic)     Auto bipap             Digestive    Yan's esophagus determined by biopsy (Chronic)     05/23 EGD from [facility]   Current medications: Pantoprazole 40mg             Endocrine    Hypothyroidism (Chronic)     12/23 TSH 4.324  Repeat TSH ordered    Current medications: Levothyroxine 125mcg  Continue current medication regimen           Relevant Orders    TSH, 3rd generation with Free T4 reflex       Nervous and Auditory    Dyskinesia, drug-induced     Generalized dyskinesias affecting both upper and lower extremities.   Patient is on Depakote and carbamazepine for her psychiatric comorbidities, which can cause tremors.    Psychiatrist decreased the Depakote and increasing her Trileptal.  Having a side-to-side tremor of her lower jaw when she opens her mouth ever since starting Invega however it is not significant enough to stop the medication as she feels this is helping her significantly.         RESOLVED: Other psychoactive substance abuse with unspecified psychoactive substance-induced disorder (HCC)        Musculoskeletal and Integument    Cervical spondylosis     Follows with pain management and receives injections          Ingrown toenail     Follows with podiatry outpatient             Genitourinary    Overactive bladder (Chronic)     Current medications: Oxybutynin 5mg BID             Behavioral Health    Schizoaffective disorder, bipolar type (HCC) (Chronic)     Current medications: Bupropion 450mg, Clonidine 0.2mg, Lurasidone 40mg         Anxiety     Under the management of psychiatry          Borderline personality disorder ([facility])     Medications currently managed by psychiatry          Major depressive disorder     Under the management of psychiatry          Mood disorder ([facility])     Under the management of psychiatry          Substance abuse ([facility])     Reported not using methamphetamines since September 2023             Blood    Platelets decreased ([facility])     Follows with hematology             Rheumatology    Sjogren's syndrome ([facility])     Previously on Pilocarpine 5mg TID with moderate relief of symptoms related to dry mouth  Reordered today          Relevant Medications    pilocarpine (SALAGEN) 5 mg tablet       Surgery/Wound/Pain    Bilateral leg edema     Chronic bilateral lower extremity edema   Current medications: furosemide 20mg daily  Continue current medication regimen          Cervicalgia     Follows with neurology and s/p botox for cervicogenic migraine  Has been completing regular PT   Worsening since chiropractic manipulation in May.  Seen by pain management   Injections done and provided moderate relief of symptoms             Neurology/Sleep    Cognitive impairment     Current medications: Strattera 40mg and 60mg             Other    History of pulmonary embolism     09/23 CTA Chest    Large saddle embolus is seen which extends into the right and left lower lobar and several bilateral lower lobe segmental pulmonary arterial branches. There is associated evidence of right heart strain.  Current  medication: Coumadin 5mg   Managed by hematology   Goal INR 2-3          Lymphedema     Recently completed lymphedema clinic   Current medications: Furosemide 20mg daily          Mixed hyperlipidemia     01/24 Lipid panel  Cholesterol 207, Triglycerides 162, , HDL 48  Numbers improved since last visit   Current Medications:none     Lifestyle modification: Incorporate regular exercise, avoid sugars and simple carbohydrates, weight loss and choosing healthier fats.           Obesity, morbid, BMI 50 or higher (HCC)     Follows with weight management   Current medications: Naltrexone 50mg   Continue current medication regimen          Prediabetes     12/23 Last A1C 5.7  05/24 Current A1C 5.4  Current medications: Metformin 750mg  Continue current medication regimen           Relevant Orders    POCT hemoglobin A1c     Other Visit Diagnoses       Medicare annual wellness visit, subsequent    -  Primary             Preventive health issues were discussed with patient, and age appropriate screening tests were ordered as noted in patient's After Visit Summary.  Personalized health advice and appropriate referrals for health education or preventive services given if needed, as noted in patient's After Visit Summary.     History of Present Illness:     Patient presents for a Medicare Wellness Visit    AWV        Patient Care Team:  JHONATAN Armando as PCP - General (Nurse Practitioner)  Arnold Sims MD as PCP - PCP-[facility] ([facility])  Marilyn Diaz MD (Endocrinology)  Kyree Winkler MD (Orthopedic Surgery)  Zaira Talamantes DO (Obstetrics and Gynecology)     Review of Systems:     Review of Systems   Constitutional:  Negative for activity change, chills, fatigue and fever.   HENT:  Negative for congestion, ear pain, rhinorrhea, sore throat and trouble swallowing.    Eyes:  Negative for pain and visual disturbance.   Respiratory:  Negative for cough, chest tightness and shortness of breath.     Cardiovascular:  Negative for chest pain, palpitations and leg swelling.   Gastrointestinal:  Negative for abdominal pain, constipation, diarrhea, nausea and vomiting.   Genitourinary:  Negative for difficulty urinating, dysuria, hematuria and urgency.   Musculoskeletal:  Negative for arthralgias and back pain.   Skin:  Negative for color change and rash.   Neurological:  Negative for dizziness, seizures, syncope and headaches.   Psychiatric/Behavioral:  Negative for dysphoric mood. The patient is not nervous/anxious.    All other systems reviewed and are negative.     Problem List:     Patient Active Problem List   Diagnosis    Yan's esophagus determined by biopsy    Benign essential hypertension    Schizoaffective disorder, bipolar type ([facility])    Chronic low back pain    Family history of renal cancer    Hypothyroidism    MAG (obstructive sleep apnea)    Overactive bladder    Major depressive disorder    Sjogren's syndrome ([facility])    COPD (chronic obstructive pulmonary disease) ([facility])    Mixed hyperlipidemia    Obesity, morbid, BMI 50 or higher ([facility])    Anticoagulation management encounter    Mood disorder ([facility])    Substance abuse ([facility])    Cognitive impairment    Anxiety    Borderline personality disorder ([facility])    Platelets decreased ([facility])    Chronic eczematous otitis externa of both ears    Chronic migraine without aura without status migrainosus, not intractable    Bilateral leg edema    Cervicalgia    History of pulmonary embolism    Restless leg syndrome    Lymphedema    History of DVT (deep vein thrombosis)    Ingrown toenail    Dyskinesia, drug-induced    Cervical spondylosis    Prediabetes      Past Medical and Surgical History:     Past Medical History:   Diagnosis Date    Acute deep vein thrombosis of lower leg, left ([facility]) 10/16/2023    Acute heart failure, unspecified heart failure type ([facility]) 05/06/2024    Anxiety     Arthritis     oa cassandra knees    Asthma     good control- no medications    Yan's  "esophagus     Bipolar affective disorder (HCC)     Cannabis abuse with unspecified 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HCC)     Chronic pain disorder     lumbar    Contusion of elbow 12/21/2021    COPD (chronic obstructive pulmonary disease) (HCC)     CPAP (continuous positive airway pressure) dependence     DDD (degenerative disc disease), lumbar 04/09/2015    Degenerative disc disease at L5-S1 level     Deliberate self-cutting     9/15/22per pt has not done recently-- does see a therapist and psychiatrist regularly    Depression 09/16/2008    Diarrhea 01/06/2022    Disease of thyroid gland     hypo    MARITNEZ (dyspnea on exertion)     per pt \"has had this with exertion and not new\"    Drug overdose 10/28/2008    suicide attempt and again drug overdose 7/2022-- [facility] floor and than transferred to [facility]    Dysphagia     Dyspnea     Ecchymosis 01/06/2022    Edema     BLE    Elevated troponin 09/02/2023    Family history of blood clots     GERD (gastroesophageal reflux disease)     Grief 11/11/2023    Headache(784.0)     Headache, tension-type     Hemorrhage of rectum and anus 10/02/2017    Formatting of this note might be different from the original.  Added automatically from request for surgery [id number]    High blood pressure     History of COVID-19 12/30/2020    Symptoms started on 12/30/2020 and then got worse.  Today she is feeling a little bit better.  She is a candidate for monoclonal antibody infusion and set for 1/6/21 @ 1pm at [facility]. 01/07/21 - telemedicine -     Hyperlipidemia     Hypertension     Intentional overdose (HCC) 09/27/2023    Intentional self-harm by unspecified sharp object, sequela (HCC) 02/09/2023    Knee pain, bilateral     Especially right    Knee pain, right 02/23/2022    Medial epicondylitis of elbow, left 04/03/2018    Formatting of this note might be different from the original.  Added automatically from request for surgery [id number]    Medial epicondylitis of right elbow 07/17/2023 "    Medical marijuana use     Memory difficulties 08/06/2020    Memory loss     Migraines     Obesity     Other psychoactive substance abuse with unspecified psychoactive substance-induced disorder (HCC) 05/06/2024    Overactive bladder     Polysubstance abuse (HCC) 09/29/2023    Potential for cognitive impairment 06/17/2021    Primary osteoarthritis of both knees 08/08/2018    Pulmonary emboli (HCC)     Restrictive lung disease 02/01/2017    Last Assessment & Plan:   Formatting of this note might be different from the original.  I reviewed this problem throughout the rest of the note. Please refer to the other assessments for details.    Sjogren's disease (HCC)     Sleep apnea     Stress incontinence     Suicidal deliberate poisoning (HCC) 07/13/2022    Suicidal ideations     Teeth missing     Toxic encephalopathy 11/08/2023    Tremor     per pt Tremors of Right Leg and both Arms    Visual impairment     Wears glasses      Past Surgical History:   Procedure Laterality Date    BREAST CYST EXCISION Right 1989    CARPAL TUNNEL RELEASE Left     CHOLECYSTECTOMY  05/2003    Laparoscopic    COLONOSCOPY      01/12/2009    DILATION AND CURETTAGE OF UTERUS      ELBOW SURGERY Left     x2. No hardware    ESOPHAGOGASTRODUODENOSCOPY      FOOT SURGERY Left     Plantar fasciotomy    HERNIA REPAIR      HYSTERECTOMY      laporoscopic, partial    KNEE ARTHROSCOPY Bilateral     OOPHORECTOMY Left 10/2015    GA GASTROCNEMIUS RECESSION Left 02/24/2021    Procedure: RECESSION GASTROC OPEN;  Surgeon: Nomi Yang DPM;  Location:  MAIN OR;  Service: Podiatry    GA RPR UMBILICAL HRNA 5 YRS/> REDUCIBLE N/A 04/24/2019    Procedure: REPAIR HERNIA UMBILICAL LAPAROSCOPIC W/ ROBOTICS;  Surgeon: Anahi Colon MD;  Location: [facility];  Service: General    GA SPHINCTEROTOMY ANAL DIVISION SPHINCTER SPX N/A 08/31/2018    Procedure: EUA, LEFT PARTIAL INTERNAL SPHINCTEROTOMY;  Surgeon: Tien Reyes MD;  Location:  MAIN OR;  Service: Colorectal     MI TNOT ELBOW LATERAL/MEDIAL DEBRIDE OPEN TDN RPR Left 2022    Procedure: RELEASE EPICONDYLAR ELBOW MEDIAL;  Surgeon: Kyree Winkler MD;  Location: [facility] OR;  Service: Orthopedics    REDUCTION MAMMAPLASTY Bilateral 1999    REPAIR LACERATION Left     left hand-2009    REPLACEMENT TOTAL KNEE Right     ROTATOR CUFF REPAIR Left     TONSILECTOMY AND ADNOIDECTOMY      US GUIDED MSK PROCEDURE  2021    VASCULAR SURGERY      VEIN LIGATION Bilateral     WISDOM TOOTH EXTRACTION        Family History:     Family History   Problem Relation Age of Onset    Kidney cancer Mother     Diabetes Mother     Depression Mother     Stroke Mother     Arthritis Mother     Cancer Mother     Psychiatric Illness Mother     No Known Problems Father     No Known Problems Sister     No Known Problems Maternal Grandmother     No Known Problems Maternal Grandfather     No Known Problems Paternal Grandmother     No Known Problems Paternal Grandfather     Colon cancer Maternal Uncle     Colon cancer Maternal Uncle     Colon cancer Paternal Uncle     Colon cancer Family     Alcohol abuse Sister     Asthma Sister       Social History:     Social History     Socioeconomic History    Marital status: Single     Spouse name: None    Number of children: None    Years of education: None    Highest education level: None   Occupational History    None   Tobacco Use    Smoking status: Former     Current packs/day: 0.00     Average packs/day: 2.0 packs/day for 33.0 years (66.0 ttl pk-yrs)     Types: Cigarettes     Start date: 1985     Quit date: 2018     Years since quittin.3    Smokeless tobacco: Never    Tobacco comments:     Started at age 16.   Vaping Use    Vaping status: Never Used   Substance and Sexual Activity    Alcohol use: Not Currently     Comment: Recovering alcoholic    Drug use: Yes     Types: Marijuana, Methamphetamines     Comment: 2 months off meth    Sexual activity: Not Currently     Partners: Male      Comment: defer   Other Topics Concern    None   Social History Narrative    None     Social Determinants of Health     Financial Resource Strain: Low Risk  (2/6/2024)    Overall Financial Resource Strain (CARDIA)     Difficulty of Paying Living Expenses: Not hard at all   Food Insecurity: No Food Insecurity (5/6/2024)    Hunger Vital Sign     Worried About Running Out of Food in the Last Year: Never true     Ran Out of Food in the Last Year: Never true   Transportation Needs: No Transportation Needs (5/6/2024)    PRAPARE - Transportation     Lack of Transportation (Medical): No     Lack of Transportation (Non-Medical): No   Physical Activity: Not on file   Stress: Not on file   Social Connections: Not on file   Intimate Partner Violence: Not on file   Housing Stability: Unknown (5/6/2024)    Housing Stability Vital Sign     Unable to Pay for Housing in the Last Year: No     Number of Places Lived in the Last Year: Not on file     Unstable Housing in the Last Year: No      Medications and Allergies:     Current Outpatient Medications   Medication Sig Dispense Refill    atoMOXetine (STRATTERA) 40 mg capsule Take 1 capsule (40 mg total) by mouth daily 30 capsule 1    atoMOXetine (STRATTERA) 60 mg capsule       buPROPion (FORFIVO XL) 450 MG 24 hr tablet Take 1 tablet (450 mg total) by mouth daily 30 tablet 1    buPROPion (WELLBUTRIN XL) 150 mg 24 hr tablet       Cholecalciferol (Vitamin D3) 125 MCG (5000 UT) capsule Take 1 capsule (5,000 Units total) by mouth daily 90 capsule 1    cloNIDine (CATAPRES) 0.2 mg tablet Take 1 tablet (0.2 mg total) by mouth every 12 (twelve) hours 60 tablet 1    cyanocobalamin (VITAMIN B-12) 1000 MCG tablet Take 1 tablet (1,000 mcg total) by mouth daily 90 tablet 1    furosemide (LASIX) 20 mg tablet Take 1 tablet (20 mg total) by mouth daily 90 tablet 1    levothyroxine (Euthyrox) 125 mcg tablet Take 1 tablet (125 mcg total) by mouth daily in the early morning 90 tablet 1    losartan  "(COZAAR) 100 MG tablet Take 1 tablet (100 mg total) by mouth daily 90 tablet 1    lurasidone (LATUDA) 40 mg tablet       metFORMIN (GLUCOPHAGE-XR) 750 mg 24 hr tablet Take 1 tablet (750 mg total) by mouth daily with breakfast 90 tablet 1    naltrexone (REVIA) 50 mg tablet Take 1 tablet (50 mg total) by mouth daily 30 tablet 1    OXcarbazepine (TRILEPTAL) 150 mg tablet Take 3 tablets (450 mg total) by mouth every 12 (twelve) hours 180 tablet 1    OXcarbazepine (TRILEPTAL) 300 mg tablet Take 300 mg by mouth every 12 (twelve) hours      oxybutynin (DITROPAN) 5 mg tablet Take 1 tablet (5 mg total) by mouth 2 (two) times a day 120 tablet 1    paliperidone palmitate ER (INVEGA) 234 mg/1.5 mL IM injection 1.5 mL (234 mg total) by IM- Deltoid route every 4 weeks Do not start before February 28, 2024.      pantoprazole (PROTONIX) 40 mg tablet Take 1 tablet (40 mg total) by mouth daily in the early morning 90 tablet 1    pilocarpine (SALAGEN) 5 mg tablet Take 1 tablet (5 mg total) by mouth 3 (three) times a day 270 tablet 0    topiramate (TOPAMAX) 200 MG tablet Take 1 tablet (200 mg total) by mouth 2 (two) times a day 60 tablet 1    warfarin (COUMADIN) 5 mg tablet Take 1 tablet (5 mg total) by mouth daily 90 tablet 1     No current facility-administered medications for this visit.     Allergies   Allergen Reactions    Percocet [Oxycodone-Acetaminophen] Headache     Severe headaches   (denies issues with Tylenol)    Povidone Iodine Rash     Unsure if betadine or gauze post surgical. Got rash on leg.   Has  Had itchy rashes after every surgery prep and IV insertion    Chlorhexidine Rash     Per pt \"able to use the liquid soap--thinkd reaction from the sponges or wipes\"      Immunizations:     Immunization History   Administered Date(s) Administered    COVID-19 PFIZER VACCINE 0.3 ML IM 03/31/2021, 04/21/2021, 12/13/2021, 05/09/2022    COVID-19 Pfizer vac (Edwar-sucrose, gray cap) 12 yr+ IM 05/09/2022    INFLUENZA 08/07/2014, " 07/29/2015, 10/10/2018, 11/10/2022, 10/18/2023    Influenza Split 07/29/2015    Influenza, injectable, quadrivalent, preservative free 0.5 mL 10/18/2023    Influenza, recombinant, quadrivalent,injectable, preservative free 11/27/2018, 09/20/2021, 11/10/2022    Tdap 01/26/2009, 07/23/2018    Tuberculin Skin Test-PPD Intradermal 12/10/2018, 01/26/2022      Health Maintenance:         Topic Date Due    Cervical Cancer Screening  12/28/2023    Lung Cancer Screening  11/22/2024    Breast Cancer Screening: Mammogram  07/06/2025    Colorectal Cancer Screening  09/03/2030    HIV Screening  Completed    Hepatitis C Screening  Completed         Topic Date Due    Pneumococcal Vaccine: Pediatrics (0 to 5 Years) and At-Risk Patients (6 to 64 Years) (1 of 2 - PCV) Never done    Hepatitis A Vaccine (1 of 2 - Risk 2-dose series) Never done    COVID-19 Vaccine (6 - 2023-24 season) 09/01/2023      Medicare Screening Tests and Risk Assessments:     Blanca is here for her Subsequent Wellness visit. Last Medicare Wellness visit information reviewed, patient interviewed and updates made to the record today.      Health Risk Assessment:   Patient rates overall health as fair. Patient feels that their physical health rating is slightly better. Patient is satisfied with their life. Eyesight was rated as same. Hearing was rated as same. Patient feels that their emotional and mental health rating is slightly better. Patients states they are sometimes angry. Patient states they are always unusually tired/fatigued. Pain experienced in the last 7 days has been none. Patient states that she has experienced no weight loss or gain in last 6 months.     Fall Risk Screening:   In the past year, patient has experienced: no history of falling in past year      Urinary Incontinence Screening:   Patient has leaked urine accidently in the last six months.     Home Safety:  Patient has trouble with stairs inside or outside of their home. Patient has working  smoke alarms and has working carbon monoxide detector. Home safety hazards include: none.     Nutrition:   Current diet is Regular.     Medications:   Patient is currently taking over-the-counter supplements. OTC medications include: see medication list. Patient is able to manage medications.     Activities of Daily Living (ADLs)/Instrumental Activities of Daily Living (IADLs):   Walk and transfer into and out of bed and chair?: Yes  Dress and groom yourself?: Yes    Bathe or shower yourself?: Yes    Feed yourself? Yes  Do your laundry/housekeeping?: Yes  Manage your money, pay your bills and track your expenses?: Yes  Make your own meals?: Yes    Do your own shopping?: Yes    Previous Hospitalizations:   Any hospitalizations or ED visits within the last 12 months?: Yes    How many hospitalizations have you had in the last year?: 3-4    Advance Care Planning:   Living will: Yes    Advanced directive: Yes      PREVENTIVE SCREENINGS      Cardiovascular Screening:    General: Screening Not Indicated and History Lipid Disorder      Diabetes Screening:     General: Screening Current      Colorectal Cancer Screening:     General: Screening Current      Breast Cancer Screening:     General: Screening Current      Cervical Cancer Screening:    General: Screening Current      Lung Cancer Screening:     General: Screening Current      Hepatitis C Screening:    General: Screening Current    Screening, Brief Intervention, and Referral to Treatment (SBIRT)    Screening  Typical number of drinks in a day: 0  Typical number of drinks in a week: 0  Interpretation: Low risk drinking behavior.    Single Item Drug Screening:  How often have you used an illegal drug (including marijuana) or a prescription medication for non-medical reasons in the past year? never    Single Item Drug Screen Score: 0  Interpretation: Negative screen for possible drug use disorder    Brief Intervention  Alcohol & drug use screenings were reviewed. No  "concerns regarding substance use disorder identified.     Time Spent  Time spent screening/evaluating the patient for alcohol misuse: 0 minutes. Time spent providing alcohol/substance abuse assessment and intervention services: 0 minutes.    Other Counseling Topics:   Car/seat belt/driving safety, skin self-exam, sunscreen and calcium and vitamin D intake and regular weightbearing exercise.     No results found.     Physical Exam:     /80 (BP Location: Left arm, Patient Position: Sitting, Cuff Size: Adult)   Pulse 82   Temp (!) 97.3 °F (36.3 °C) (Temporal)   Ht 5' 6\" (1.676 m)   Wt (!) 142 kg (314 lb)   SpO2 95%   BMI 50.68 kg/m²     Physical Exam  Vitals and nursing note reviewed.   Constitutional:       General: She is not in acute distress.     Appearance: Normal appearance. She is well-developed. She is obese.   HENT:      Head: Normocephalic and atraumatic.      Right Ear: Tympanic membrane, ear canal and external ear normal. There is no impacted cerumen.      Left Ear: Tympanic membrane, ear canal and external ear normal. There is no impacted cerumen.      Nose: Nose normal.      Mouth/Throat:      Mouth: Mucous membranes are moist.      Pharynx: Oropharynx is clear.   Eyes:      Extraocular Movements: Extraocular movements intact.      Conjunctiva/sclera: Conjunctivae normal.      Pupils: Pupils are equal, round, and reactive to light.   Cardiovascular:      Rate and Rhythm: Normal rate and regular rhythm.      Pulses: Normal pulses.      Heart sounds: Normal heart sounds. No murmur heard.  Pulmonary:      Effort: Pulmonary effort is normal. No respiratory distress.      Breath sounds: Normal breath sounds.   Abdominal:      General: Bowel sounds are normal.      Palpations: Abdomen is soft.      Tenderness: There is no abdominal tenderness.   Musculoskeletal:         General: Normal range of motion.      Cervical back: Normal range of motion and neck supple.      Right lower leg: No edema.      " Left lower leg: No edema.   Skin:     General: Skin is warm and dry.      Capillary Refill: Capillary refill takes less than 2 seconds.   Neurological:      General: No focal deficit present.      Mental Status: She is alert and oriented to person, place, and time. Mental status is at baseline.   Psychiatric:         Mood and Affect: Mood normal.         Behavior: Behavior normal.         Thought Content: Thought content normal.         Judgment: Judgment normal.        Patient Instructions   Medicare Preventive Visit Patient Instructions  Thank you for completing your Welcome to Medicare Visit or Medicare Annual Wellness Visit today. Your next wellness visit will be due in one year (5/7/2025).  The screening/preventive services that you may require over the next 5-10 years are detailed below. Some tests may not apply to you based off risk factors and/or age. Screening tests ordered at today's visit but not completed yet may show as past due. Also, please note that scanned in results may not display below.  Preventive Screenings:  Service Recommendations Previous Testing/Comments   Colorectal Cancer Screening  * Colonoscopy    * Fecal Occult Blood Test (FOBT)/Fecal Immunochemical Test (FIT)  * Fecal DNA/Cologuard Test  * Flexible Sigmoidoscopy Age: 45-75 years old   Colonoscopy: every 10 years (may be performed more frequently if at higher risk)  OR  FOBT/FIT: every 1 year  OR  Cologuard: every 3 years  OR  Sigmoidoscopy: every 5 years  Screening may be recommended earlier than age 45 if at higher risk for colorectal cancer. Also, an individualized decision between you and your healthcare provider will decide whether screening between the ages of 76-85 would be appropriate. Colonoscopy: 09/03/2020  FOBT/FIT: Not on file  Cologuard: Not on file  Sigmoidoscopy: Not on file    Screening Current     Breast Cancer Screening Age: 40+ years old  Frequency: every 1-2 years  Not required if history of left and right  mastectomy Mammogram: 07/06/2023    Screening Current   Cervical Cancer Screening Between the ages of 21-29, pap smear recommended once every 3 years.   Between the ages of 30-65, can perform pap smear with HPV co-testing every 5 years.   Recommendations may differ for women with a history of total hysterectomy, cervical cancer, or abnormal pap smears in past. Pap Smear: 12/28/2022    Screening Current   Hepatitis C Screening Once for adults born between 1945 and 1965  More frequently in patients at high risk for Hepatitis C Hep C Antibody: 07/10/2019    Screening Current   Diabetes Screening 1-2 times per year if you're at risk for diabetes or have pre-diabetes Fasting glucose: 88 mg/dL (1/29/2024)  A1C: 5.7 % (1/29/2024)  Screening Current   Cholesterol Screening Once every 5 years if you don't have a lipid disorder. May order more often based on risk factors. Lipid panel: 01/29/2024    Screening Not Indicated  History Lipid Disorder     Other Preventive Screenings Covered by Medicare:  Abdominal Aortic Aneurysm (AAA) Screening: covered once if your at risk. You're considered to be at risk if you have a family history of AAA.  Lung Cancer Screening: covers low dose CT scan once per year if you meet all of the following conditions: (1) Age 55-77; (2) No signs or symptoms of lung cancer; (3) Current smoker or have quit smoking within the last 15 years; (4) You have a tobacco smoking history of at least 20 pack years (packs per day multiplied by number of years you smoked); (5) You get a written order from a healthcare provider.  Glaucoma Screening: covered annually if you're considered high risk: (1) You have diabetes OR (2) Family history of glaucoma OR (3)  aged 50 and older OR (4)  American aged 65 and older  Osteoporosis Screening: covered every 2 years if you meet one of the following conditions: (1) You're estrogen deficient and at risk for osteoporosis based off medical history and  other findings; (2) Have a vertebral abnormality; (3) On glucocorticoid therapy for more than 3 months; (4) Have primary hyperparathyroidism; (5) On osteoporosis medications and need to assess response to drug therapy.   Last bone density test (DXA Scan): Not on file.  HIV Screening: covered annually if you're between the age of 15-65. Also covered annually if you are younger than 15 and older than 65 with risk factors for HIV infection. For pregnant patients, it is covered up to 3 times per pregnancy.    Immunizations:  Immunization Recommendations   Influenza Vaccine Annual influenza vaccination during flu season is recommended for all persons aged >= 6 months who do not have contraindications   Pneumococcal Vaccine   * Pneumococcal conjugate vaccine = PCV13 (Prevnar 13), PCV15 (Vaxneuvance), PCV20 (Prevnar 20)  * Pneumococcal polysaccharide vaccine = PPSV23 (Pneumovax) Adults 19-63 yo with certain risk factors or if 65+ yo  If never received any pneumonia vaccine: recommend Prevnar 20 (PCV20)  Give PCV20 if previously received 1 dose of PCV13 or PPSV23   Hepatitis B Vaccine 3 dose series if at intermediate or high risk (ex: diabetes, end stage renal disease, liver disease)   Respiratory syncytial virus (RSV) Vaccine - COVERED BY MEDICARE PART D  * RSVPreF3 (Arexvy) CDC recommends that adults 60 years of age and older may receive a single dose of RSV vaccine using shared clinical decision-making (SCDM)   Tetanus (Td) Vaccine - COST NOT COVERED BY MEDICARE PART B Following completion of primary series, a booster dose should be given every 10 years to maintain immunity against tetanus. Td may also be given as tetanus wound prophylaxis.   Tdap Vaccine - COST NOT COVERED BY MEDICARE PART B Recommended at least once for all adults. For pregnant patients, recommended with each pregnancy.   Shingles Vaccine (Shingrix) - COST NOT COVERED BY MEDICARE PART B  2 shot series recommended in those 19 years and older who have or  will have weakened immune systems or those 50 years and older     Health Maintenance Due:      Topic Date Due    Cervical Cancer Screening  12/28/2023    Lung Cancer Screening  11/22/2024    Breast Cancer Screening: Mammogram  07/06/2025    Colorectal Cancer Screening  09/03/2030    HIV Screening  Completed    Hepatitis C Screening  Completed     Immunizations Due:      Topic Date Due    Pneumococcal Vaccine: Pediatrics (0 to 5 Years) and At-Risk Patients (6 to 64 Years) (1 of 2 - PCV) Never done    Hepatitis A Vaccine (1 of 2 - Risk 2-dose series) Never done    COVID-19 Vaccine (6 - 2023-24 season) 09/01/2023     Advance Directives   What are advance directives?  Advance directives are legal documents that state your wishes and plans for medical care. These plans are made ahead of time in case you lose your ability to make decisions for yourself. Advance directives can apply to any medical decision, such as the treatments you want, and if you want to donate organs.   What are the types of advance directives?  There are many types of advance directives, and each state has rules about how to use them. You may choose a combination of any of the following:  Living will:  This is a written record of the treatment you want. You can also choose which treatments you do not want, which to limit, and which to stop at a certain time. This includes surgery, medicine, IV fluid, and tube feedings.   Durable power of  for healthcare (DPAHC):  This is a written record that states who you want to make healthcare choices for you when you are unable to make them for yourself. This person, called a proxy, is usually a family member or a friend. You may choose more than 1 proxy.  Do not resuscitate (DNR) order:  A DNR order is used in case your heart stops beating or you stop breathing. It is a request not to have certain forms of treatment, such as CPR. A DNR order may be included in other types of advance directives.  Medical  directive:  This covers the care that you want if you are in a coma, near death, or unable to make decisions for yourself. You can list the treatments you want for each condition. Treatment may include pain medicine, surgery, blood transfusions, dialysis, IV or tube feedings, and a ventilator (breathing machine).  Values history:  This document has questions about your views, beliefs, and how you feel and think about life. This information can help others choose the care that you would choose.  Why are advance directives important?  An advance directive helps you control your care. Although spoken wishes may be used, it is better to have your wishes written down. Spoken wishes can be misunderstood, or not followed. Treatments may be given even if you do not want them. An advance directive may make it easier for your family to make difficult choices about your care.   Urinary Incontinence   Urinary incontinence (UI)  is when you lose control of your bladder. UI develops because your bladder cannot store or empty urine properly. The 3 most common types of UI are stress incontinence, urge incontinence, or both.  Medicines:   May be given to help strengthen your bladder control. Report any side effects of medication to your healthcare provider.  Do pelvic muscle exercises often:  Your pelvic muscles help you stop urinating. Squeeze these muscles tight for 5 seconds, then relax for 5 seconds. Gradually work up to squeezing for 10 seconds. Do 3 sets of 15 repetitions a day, or as directed. This will help strengthen your pelvic muscles and improve bladder control.  Train your bladder:  Go to the bathroom at set times, such as every 2 hours, even if you do not feel the urge to go. You can also try to hold your urine when you feel the urge to go. For example, hold your urine for 5 minutes when you feel the urge to go. As that becomes easier, hold your urine for 10 minutes.   Self-care:   Keep a UI record.  Write down how  often you leak urine and how much you leak. Make a note of what you were doing when you leaked urine.  Drink liquids as directed. You may need to limit the amount of liquid you drink to help control your urine leakage. Do not drink any liquid right before you go to bed. Limit or do not have drinks that contain caffeine or alcohol.   Prevent constipation.  Eat a variety of high-fiber foods. Good examples are high-fiber cereals, beans, vegetables, and whole-grain breads. Walking is the best way to trigger your intestines to have a bowel movement.  Exercise regularly and maintain a healthy weight.  Weight loss and exercise will decrease pressure on your bladder and help you control your leakage.   Use a catheter as directed  to help empty your bladder. A catheter is a tiny, plastic tube that is put into your bladder to drain your urine.   Go to behavior therapy as directed.  Behavior therapy may be used to help you learn to control your urge to urinate.    Weight Management   Why it is important to manage your weight:  Being overweight increases your risk of health conditions such as heart disease, high blood pressure, type 2 diabetes, and certain types of cancer. It can also increase your risk for osteoarthritis, sleep apnea, and other respiratory problems. Aim for a slow, steady weight loss. Even a small amount of weight loss can lower your risk of health problems.  How to lose weight safely:  A safe and healthy way to lose weight is to eat fewer calories and get regular exercise. You can lose up about 1 pound a week by decreasing the number of calories you eat by 500 calories each day.   Healthy meal plan for weight management:  A healthy meal plan includes a variety of foods, contains fewer calories, and helps you stay healthy. A healthy meal plan includes the following:  Eat whole-grain foods more often.  A healthy meal plan should contain fiber. Fiber is the part of grains, fruits, and vegetables that is not  broken down by your body. Whole-grain foods are healthy and provide extra fiber in your diet. Some examples of whole-grain foods are whole-wheat breads and pastas, oatmeal, brown rice, and bulgur.  Eat a variety of vegetables every day.  Include dark, leafy greens such as spinach, kale, brandie greens, and mustard greens. Eat yellow and orange vegetables such as carrots, sweet potatoes, and winter squash.   Eat a variety of fruits every day.  Choose fresh or canned fruit (canned in its own juice or light syrup) instead of juice. Fruit juice has very little or no fiber.  Eat low-fat dairy foods.  Drink fat-free (skim) milk or 1% milk. Eat fat-free yogurt and low-fat cottage cheese. Try low-fat cheeses such as mozzarella and other reduced-fat cheeses.  Choose meat and other protein foods that are low in fat.  Choose beans or other legumes such as split peas or lentils. Choose fish, skinless poultry (chicken or turkey), or lean cuts of red meat (beef or pork). Before you cook meat or poultry, cut off any visible fat.   Use less fat and oil.  Try baking foods instead of frying them. Add less fat, such as margarine, sour cream, regular salad dressing and mayonnaise to foods. Eat fewer high-fat foods. Some examples of high-fat foods include french fries, doughnuts, ice cream, and cakes.  Eat fewer sweets.  Limit foods and drinks that are high in sugar. This includes candy, cookies, regular soda, and sweetened drinks.  Exercise:  Exercise at least 30 minutes per day on most days of the week. Some examples of exercise include walking, biking, dancing, and swimming. You can also fit in more physical activity by taking the stairs instead of the elevator or parking farther away from stores. Ask your healthcare provider about the best exercise plan for you.      © Copyright [employer] 2018 Information is for End User's use only and may not be sold, redistributed or otherwise used for commercial purposes. All illustrations  and images included in CareNotes® are the copyrighted property of [email or web address] or [employer]           JHONATAN Armando

## 2024-05-06 NOTE — ASSESSMENT & PLAN NOTE
Previously on Pilocarpine 5mg TID with moderate relief of symptoms related to dry mouth  Reordered today

## 2024-05-06 NOTE — ASSESSMENT & PLAN NOTE
Chronic bilateral lower extremity edema   Current medications: furosemide 20mg daily  Continue current medication regimen

## 2024-05-06 NOTE — ASSESSMENT & PLAN NOTE
01/24 Lipid panel  Cholesterol 207, Triglycerides 162, , HDL 48  Numbers improved since last visit   Current Medications:none     Lifestyle modification: Incorporate regular exercise, avoid sugars and simple carbohydrates, weight loss and choosing healthier fats.

## 2024-05-06 NOTE — ASSESSMENT & PLAN NOTE
12/23 TSH 4.324  Repeat TSH ordered    Current medications: Levothyroxine 125mcg  Continue current medication regimen

## 2024-05-06 NOTE — ASSESSMENT & PLAN NOTE
Today's /80  Current medication:  Losartan 100mg, Lasix 20mg, Clonidine 0.2mg BID  Continue current medication regimen   Advised patient on symptoms of hypotension & severe HTN  Limit salt-intake & caffeine. DASH diet discussed, minimize stress level  Weight reduction efforts via improved diet & increased exercise

## 2024-05-06 NOTE — PATIENT INSTRUCTIONS
Medicare Preventive Visit Patient Instructions  Thank you for completing your Welcome to Medicare Visit or Medicare Annual Wellness Visit today. Your next wellness visit will be due in one year (5/7/2025).  The screening/preventive services that you may require over the next 5-10 years are detailed below. Some tests may not apply to you based off risk factors and/or age. Screening tests ordered at today's visit but not completed yet may show as past due. Also, please note that scanned in results may not display below.  Preventive Screenings:  Service Recommendations Previous Testing/Comments   Colorectal Cancer Screening  * Colonoscopy    * Fecal Occult Blood Test (FOBT)/Fecal Immunochemical Test (FIT)  * Fecal DNA/Cologuard Test  * Flexible Sigmoidoscopy Age: 45-75 years old   Colonoscopy: every 10 years (may be performed more frequently if at higher risk)  OR  FOBT/FIT: every 1 year  OR  Cologuard: every 3 years  OR  Sigmoidoscopy: every 5 years  Screening may be recommended earlier than age 45 if at higher risk for colorectal cancer. Also, an individualized decision between you and your healthcare provider will decide whether screening between the ages of 76-85 would be appropriate. Colonoscopy: 09/03/2020  FOBT/FIT: Not on file  Cologuard: Not on file  Sigmoidoscopy: Not on file    Screening Current     Breast Cancer Screening Age: 40+ years old  Frequency: every 1-2 years  Not required if history of left and right mastectomy Mammogram: 07/06/2023    Screening Current   Cervical Cancer Screening Between the ages of 21-29, pap smear recommended once every 3 years.   Between the ages of 30-65, can perform pap smear with HPV co-testing every 5 years.   Recommendations may differ for women with a history of total hysterectomy, cervical cancer, or abnormal pap smears in past. Pap Smear: 12/28/2022    Screening Current   Hepatitis C Screening Once for adults born between 1945 and 1965  More frequently in patients at  high risk for Hepatitis C Hep C Antibody: 07/10/2019    Screening Current   Diabetes Screening 1-2 times per year if you're at risk for diabetes or have pre-diabetes Fasting glucose: 88 mg/dL (1/29/2024)  A1C: 5.7 % (1/29/2024)  Screening Current   Cholesterol Screening Once every 5 years if you don't have a lipid disorder. May order more often based on risk factors. Lipid panel: 01/29/2024    Screening Not Indicated  History Lipid Disorder     Other Preventive Screenings Covered by Medicare:  Abdominal Aortic Aneurysm (AAA) Screening: covered once if your at risk. You're considered to be at risk if you have a family history of AAA.  Lung Cancer Screening: covers low dose CT scan once per year if you meet all of the following conditions: (1) Age 55-77; (2) No signs or symptoms of lung cancer; (3) Current smoker or have quit smoking within the last 15 years; (4) You have a tobacco smoking history of at least 20 pack years (packs per day multiplied by number of years you smoked); (5) You get a written order from a healthcare provider.  Glaucoma Screening: covered annually if you're considered high risk: (1) You have diabetes OR (2) Family history of glaucoma OR (3)  aged 50 and older OR (4)  American aged 65 and older  Osteoporosis Screening: covered every 2 years if you meet one of the following conditions: (1) You're estrogen deficient and at risk for osteoporosis based off medical history and other findings; (2) Have a vertebral abnormality; (3) On glucocorticoid therapy for more than 3 months; (4) Have primary hyperparathyroidism; (5) On osteoporosis medications and need to assess response to drug therapy.   Last bone density test (DXA Scan): Not on file.  HIV Screening: covered annually if you're between the age of 15-65. Also covered annually if you are younger than 15 and older than 65 with risk factors for HIV infection. For pregnant patients, it is covered up to 3 times per  pregnancy.    Immunizations:  Immunization Recommendations   Influenza Vaccine Annual influenza vaccination during flu season is recommended for all persons aged >= 6 months who do not have contraindications   Pneumococcal Vaccine   * Pneumococcal conjugate vaccine = PCV13 (Prevnar 13), PCV15 (Vaxneuvance), PCV20 (Prevnar 20)  * Pneumococcal polysaccharide vaccine = PPSV23 (Pneumovax) Adults 19-65 yo with certain risk factors or if 65+ yo  If never received any pneumonia vaccine: recommend Prevnar 20 (PCV20)  Give PCV20 if previously received 1 dose of PCV13 or PPSV23   Hepatitis B Vaccine 3 dose series if at intermediate or high risk (ex: diabetes, end stage renal disease, liver disease)   Respiratory syncytial virus (RSV) Vaccine - COVERED BY MEDICARE PART D  * RSVPreF3 (Arexvy) CDC recommends that adults 60 years of age and older may receive a single dose of RSV vaccine using shared clinical decision-making (SCDM)   Tetanus (Td) Vaccine - COST NOT COVERED BY MEDICARE PART B Following completion of primary series, a booster dose should be given every 10 years to maintain immunity against tetanus. Td may also be given as tetanus wound prophylaxis.   Tdap Vaccine - COST NOT COVERED BY MEDICARE PART B Recommended at least once for all adults. For pregnant patients, recommended with each pregnancy.   Shingles Vaccine (Shingrix) - COST NOT COVERED BY MEDICARE PART B  2 shot series recommended in those 19 years and older who have or will have weakened immune systems or those 50 years and older     Health Maintenance Due:      Topic Date Due   • Cervical Cancer Screening  12/28/2023   • Lung Cancer Screening  11/22/2024   • Breast Cancer Screening: Mammogram  07/06/2025   • Colorectal Cancer Screening  09/03/2030   • HIV Screening  Completed   • Hepatitis C Screening  Completed     Immunizations Due:      Topic Date Due   • Pneumococcal Vaccine: Pediatrics (0 to 5 Years) and At-Risk Patients (6 to 64 Years) (1 of 2 -  PCV) Never done   • Hepatitis A Vaccine (1 of 2 - Risk 2-dose series) Never done   • COVID-19 Vaccine (6 - 2023-24 season) 09/01/2023     Advance Directives   What are advance directives?  Advance directives are legal documents that state your wishes and plans for medical care. These plans are made ahead of time in case you lose your ability to make decisions for yourself. Advance directives can apply to any medical decision, such as the treatments you want, and if you want to donate organs.   What are the types of advance directives?  There are many types of advance directives, and each state has rules about how to use them. You may choose a combination of any of the following:  Living will:  This is a written record of the treatment you want. You can also choose which treatments you do not want, which to limit, and which to stop at a certain time. This includes surgery, medicine, IV fluid, and tube feedings.   Durable power of  for healthcare (DPAHC):  This is a written record that states who you want to make healthcare choices for you when you are unable to make them for yourself. This person, called a proxy, is usually a family member or a friend. You may choose more than 1 proxy.  Do not resuscitate (DNR) order:  A DNR order is used in case your heart stops beating or you stop breathing. It is a request not to have certain forms of treatment, such as CPR. A DNR order may be included in other types of advance directives.  Medical directive:  This covers the care that you want if you are in a coma, near death, or unable to make decisions for yourself. You can list the treatments you want for each condition. Treatment may include pain medicine, surgery, blood transfusions, dialysis, IV or tube feedings, and a ventilator (breathing machine).  Values history:  This document has questions about your views, beliefs, and how you feel and think about life. This information can help others choose the care that  you would choose.  Why are advance directives important?  An advance directive helps you control your care. Although spoken wishes may be used, it is better to have your wishes written down. Spoken wishes can be misunderstood, or not followed. Treatments may be given even if you do not want them. An advance directive may make it easier for your family to make difficult choices about your care.   Urinary Incontinence   Urinary incontinence (UI)  is when you lose control of your bladder. UI develops because your bladder cannot store or empty urine properly. The 3 most common types of UI are stress incontinence, urge incontinence, or both.  Medicines:   May be given to help strengthen your bladder control. Report any side effects of medication to your healthcare provider.  Do pelvic muscle exercises often:  Your pelvic muscles help you stop urinating. Squeeze these muscles tight for 5 seconds, then relax for 5 seconds. Gradually work up to squeezing for 10 seconds. Do 3 sets of 15 repetitions a day, or as directed. This will help strengthen your pelvic muscles and improve bladder control.  Train your bladder:  Go to the bathroom at set times, such as every 2 hours, even if you do not feel the urge to go. You can also try to hold your urine when you feel the urge to go. For example, hold your urine for 5 minutes when you feel the urge to go. As that becomes easier, hold your urine for 10 minutes.   Self-care:   Keep a UI record.  Write down how often you leak urine and how much you leak. Make a note of what you were doing when you leaked urine.  Drink liquids as directed. You may need to limit the amount of liquid you drink to help control your urine leakage. Do not drink any liquid right before you go to bed. Limit or do not have drinks that contain caffeine or alcohol.   Prevent constipation.  Eat a variety of high-fiber foods. Good examples are high-fiber cereals, beans, vegetables, and whole-grain breads. Walking is  the best way to trigger your intestines to have a bowel movement.  Exercise regularly and maintain a healthy weight.  Weight loss and exercise will decrease pressure on your bladder and help you control your leakage.   Use a catheter as directed  to help empty your bladder. A catheter is a tiny, plastic tube that is put into your bladder to drain your urine.   Go to behavior therapy as directed.  Behavior therapy may be used to help you learn to control your urge to urinate.    Weight Management   Why it is important to manage your weight:  Being overweight increases your risk of health conditions such as heart disease, high blood pressure, type 2 diabetes, and certain types of cancer. It can also increase your risk for osteoarthritis, sleep apnea, and other respiratory problems. Aim for a slow, steady weight loss. Even a small amount of weight loss can lower your risk of health problems.  How to lose weight safely:  A safe and healthy way to lose weight is to eat fewer calories and get regular exercise. You can lose up about 1 pound a week by decreasing the number of calories you eat by 500 calories each day.   Healthy meal plan for weight management:  A healthy meal plan includes a variety of foods, contains fewer calories, and helps you stay healthy. A healthy meal plan includes the following:  Eat whole-grain foods more often.  A healthy meal plan should contain fiber. Fiber is the part of grains, fruits, and vegetables that is not broken down by your body. Whole-grain foods are healthy and provide extra fiber in your diet. Some examples of whole-grain foods are whole-wheat breads and pastas, oatmeal, brown rice, and bulgur.  Eat a variety of vegetables every day.  Include dark, leafy greens such as spinach, kale, brandie greens, and mustard greens. Eat yellow and orange vegetables such as carrots, sweet potatoes, and winter squash.   Eat a variety of fruits every day.  Choose fresh or canned fruit (canned in  its own juice or light syrup) instead of juice. Fruit juice has very little or no fiber.  Eat low-fat dairy foods.  Drink fat-free (skim) milk or 1% milk. Eat fat-free yogurt and low-fat cottage cheese. Try low-fat cheeses such as mozzarella and other reduced-fat cheeses.  Choose meat and other protein foods that are low in fat.  Choose beans or other legumes such as split peas or lentils. Choose fish, skinless poultry (chicken or turkey), or lean cuts of red meat (beef or pork). Before you cook meat or poultry, cut off any visible fat.   Use less fat and oil.  Try baking foods instead of frying them. Add less fat, such as margarine, sour cream, regular salad dressing and mayonnaise to foods. Eat fewer high-fat foods. Some examples of high-fat foods include french fries, doughnuts, ice cream, and cakes.  Eat fewer sweets.  Limit foods and drinks that are high in sugar. This includes candy, cookies, regular soda, and sweetened drinks.  Exercise:  Exercise at least 30 minutes per day on most days of the week. Some examples of exercise include walking, biking, dancing, and swimming. You can also fit in more physical activity by taking the stairs instead of the elevator or parking farther away from stores. Ask your healthcare provider about the best exercise plan for you.      © Copyright [employer] 2018 Information is for End User's use only and may not be sold, redistributed or otherwise used for commercial purposes. All illustrations and images included in CareNotes® are the copyrighted property of [employer] or [facility]

## 2024-05-06 NOTE — ASSESSMENT & PLAN NOTE
12/23 Last A1C 5.7  05/24 Current A1C 5.4  Current medications: Metformin 750mg  Continue current medication regimen

## 2024-05-06 NOTE — ASSESSMENT & PLAN NOTE
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 There is associated evidence of right heart strain.  Current medication: Coumadin 5mg   Managed by hematology   Goal INR 2-3

## 2024-05-06 NOTE — ASSESSMENT & PLAN NOTE
Follows with neurology  Current medications: Topamax 200mg BID  Continue current medication regimen

## 2024-05-09 ENCOUNTER — APPOINTMENT (OUTPATIENT)
Dept: LAB | Facility: CLINIC | Age: 54
End: 2024-05-09
Payer: MEDICARE

## 2024-05-09 DIAGNOSIS — E03.9 ACQUIRED HYPOTHYROIDISM: Chronic | ICD-10-CM

## 2024-05-09 DIAGNOSIS — Z79.01 ANTICOAGULATION MANAGEMENT ENCOUNTER: ICD-10-CM

## 2024-05-09 DIAGNOSIS — Z86.711 HISTORY OF PULMONARY EMBOLISM: ICD-10-CM

## 2024-05-09 DIAGNOSIS — Z51.81 ANTICOAGULATION MANAGEMENT ENCOUNTER: ICD-10-CM

## 2024-05-09 LAB
INR PPP: 2.27 (ref 0.84–1.19)
PROTHROMBIN TIME: 24.6 SECONDS (ref 11.6–14.5)
TSH SERPL DL<=0.05 MIU/L-ACNC: 3.73 UIU/ML (ref 0.45–4.5)

## 2024-05-09 PROCEDURE — 36415 COLL VENOUS BLD VENIPUNCTURE: CPT

## 2024-05-09 PROCEDURE — 85610 PROTHROMBIN TIME: CPT

## 2024-05-09 PROCEDURE — 84443 ASSAY THYROID STIM HORMONE: CPT

## 2024-05-10 ENCOUNTER — TELEPHONE (OUTPATIENT)
Dept: HEMATOLOGY ONCOLOGY | Facility: CLINIC | Age: 54
End: 2024-05-10

## 2024-05-10 NOTE — TELEPHONE ENCOUNTER
Spoke with Blanca, informed her that her INR is 2.27.  Recommend patient take coumadin 7 mg Saturday and Sunday, then go back to 5 mg Monday through Friday.  We will recheck her INR next week.  Patient voiced understanding.

## 2024-05-13 DIAGNOSIS — F25.0 SCHIZOAFFECTIVE DISORDER, BIPOLAR TYPE (HCC): Chronic | ICD-10-CM

## 2024-05-13 DIAGNOSIS — E66.01 MORBID OBESITY WITH BMI OF 45.0-49.9, ADULT (HCC): ICD-10-CM

## 2024-05-14 ENCOUNTER — TELEPHONE (OUTPATIENT)
Dept: RADIOLOGY | Facility: CLINIC | Age: 54
End: 2024-05-14

## 2024-05-14 RX ORDER — CLONIDINE HYDROCHLORIDE 0.2 MG/1
0.2 TABLET ORAL EVERY 12 HOURS SCHEDULED
Qty: 60 TABLET | Refills: 0 | Status: SHIPPED | OUTPATIENT
Start: 2024-05-14 | End: 2024-07-13

## 2024-05-14 RX ORDER — NALTREXONE HYDROCHLORIDE 50 MG/1
50 TABLET, FILM COATED ORAL DAILY
Qty: 30 TABLET | Refills: 0 | Status: SHIPPED | OUTPATIENT
Start: 2024-05-14 | End: 2024-07-13

## 2024-05-16 ENCOUNTER — APPOINTMENT (OUTPATIENT)
Dept: LAB | Facility: CLINIC | Age: 54
End: 2024-05-16
Payer: MEDICARE

## 2024-05-16 DIAGNOSIS — Z51.81 ANTICOAGULATION MANAGEMENT ENCOUNTER: ICD-10-CM

## 2024-05-16 DIAGNOSIS — Z86.711 HISTORY OF PULMONARY EMBOLISM: ICD-10-CM

## 2024-05-16 DIAGNOSIS — Z79.01 ANTICOAGULATION MANAGEMENT ENCOUNTER: ICD-10-CM

## 2024-05-16 LAB
INR PPP: 2.43 (ref 0.84–1.19)
PROTHROMBIN TIME: 25.9 SECONDS (ref 11.6–14.5)

## 2024-05-16 PROCEDURE — 85610 PROTHROMBIN TIME: CPT

## 2024-05-16 PROCEDURE — 36415 COLL VENOUS BLD VENIPUNCTURE: CPT

## 2024-05-17 ENCOUNTER — TELEPHONE (OUTPATIENT)
Age: 54
End: 2024-05-17

## 2024-05-17 ENCOUNTER — TELEPHONE (OUTPATIENT)
Dept: HEMATOLOGY ONCOLOGY | Facility: CLINIC | Age: 54
End: 2024-05-17

## 2024-05-17 ENCOUNTER — TELEPHONE (OUTPATIENT)
Dept: FAMILY MEDICINE CLINIC | Facility: CLINIC | Age: 54
End: 2024-05-17

## 2024-05-17 DIAGNOSIS — Z79.01 ANTICOAGULATION MANAGEMENT ENCOUNTER: ICD-10-CM

## 2024-05-17 DIAGNOSIS — Z86.718 HISTORY OF DVT (DEEP VEIN THROMBOSIS): ICD-10-CM

## 2024-05-17 DIAGNOSIS — Z51.81 ANTICOAGULATION MANAGEMENT ENCOUNTER: ICD-10-CM

## 2024-05-17 DIAGNOSIS — Z86.711 HISTORY OF PULMONARY EMBOLISM: Primary | ICD-10-CM

## 2024-05-17 RX ORDER — WARFARIN SODIUM 2 MG/1
TABLET ORAL
Qty: 90 TABLET | Refills: 1 | Status: SHIPPED | OUTPATIENT
Start: 2024-05-17

## 2024-05-17 NOTE — TELEPHONE ENCOUNTER
Spoke with patient to inform her that her INR is 2.43.  Discussed patient to take Coumadin 5 mg Monday through Friday and 7 mg Saturday and Sunday.  Refill for 2 mg sent to the pharmacy.  Patient voiced understanding.

## 2024-05-23 ENCOUNTER — PROCEDURE VISIT (OUTPATIENT)
Dept: NEUROLOGY | Facility: CLINIC | Age: 54
End: 2024-05-23
Payer: MEDICARE

## 2024-05-23 ENCOUNTER — APPOINTMENT (OUTPATIENT)
Dept: LAB | Facility: CLINIC | Age: 54
End: 2024-05-23
Payer: MEDICARE

## 2024-05-23 ENCOUNTER — TELEPHONE (OUTPATIENT)
Dept: HEMATOLOGY ONCOLOGY | Facility: CLINIC | Age: 54
End: 2024-05-23

## 2024-05-23 VITALS — HEART RATE: 75 BPM | DIASTOLIC BLOOD PRESSURE: 97 MMHG | SYSTOLIC BLOOD PRESSURE: 171 MMHG | TEMPERATURE: 97.3 F

## 2024-05-23 DIAGNOSIS — Z86.711 HISTORY OF PULMONARY EMBOLISM: ICD-10-CM

## 2024-05-23 DIAGNOSIS — G43.709 CHRONIC MIGRAINE WITHOUT AURA WITHOUT STATUS MIGRAINOSUS, NOT INTRACTABLE: Primary | ICD-10-CM

## 2024-05-23 DIAGNOSIS — Z51.81 ANTICOAGULATION MANAGEMENT ENCOUNTER: ICD-10-CM

## 2024-05-23 DIAGNOSIS — Z79.01 ANTICOAGULATION MANAGEMENT ENCOUNTER: ICD-10-CM

## 2024-05-23 LAB
INR PPP: 2.26 (ref 0.84–1.19)
PROTHROMBIN TIME: 24.6 SECONDS (ref 11.6–14.5)

## 2024-05-23 PROCEDURE — 36415 COLL VENOUS BLD VENIPUNCTURE: CPT

## 2024-05-23 PROCEDURE — 85610 PROTHROMBIN TIME: CPT

## 2024-05-23 PROCEDURE — 64615 CHEMODENERV MUSC MIGRAINE: CPT | Performed by: PHYSICIAN ASSISTANT

## 2024-05-23 NOTE — PROGRESS NOTES
"Universal Protocol   Consent: Verbal consent obtained. Written consent obtained.  Risks and benefits: risks, benefits and alternatives were discussed  Consent given by: patient  Time out: Immediately prior to procedure a \"time out\" was called to verify the correct patient, procedure, equipment, support staff and site/side marked as required.  Patient understanding: patient states understanding of the procedure being performed  Patient consent: the patient's understanding of the procedure matches consent given  Procedure consent: procedure consent matches procedure scheduled  Relevant documents: relevant documents present and verified  Patient identity confirmed: verbally with patient      Chemodenervation     Date/Time  5/23/2024 9:00 AM     Performed by  Aria Toney PA-C   Authorized by  Aria Toney PA-C     Pre-procedure details      Preparation: Patient was prepped and draped in usual sterile fashion     Anesthesia  (see MAR for exact dosages):     Anesthesia method:  None   Procedure details      Position:  Upright   Botox      Botox Type:  Type A    Brand:  Botox    mL's of Botulinum Toxin:  200    mL's of preservative free sterile saline:  4    Final Concentration per CC:  50 units    Needle Gauge:  30 G 2.5 inch   Procedures      Botox Procedures: chronic headache     Injection Location      Head / Face:  L superior cervical paraspinal, R superior cervical paraspinal, L , R , R frontalis, L frontalis, R medial occipitalis, L medial occipitalis, procerus, R temporalis, L superior trapezius, R superior trapezius and L temporalis    L  injection amount:  5 unit(s)    R  injection amount:  5 unit(s)    L lateral frontalis:  5 unit(s)    R lateral frontalis:  5 unit(s)    L medial frontalis:  5 unit(s)    R medial frontalis:  5 unit(s)    L temporalis injection amount:  20 unit(s)    R temporalis injection amount:  20 unit(s)    Procerus injection amount:  5 unit(s)    L " medial occipitalis injection amount:  15 unit(s)    R medial occipitalis injection amount:  15 unit(s)    L superior cervical paraspinal injection amount:  10 unit(s)    R superior cervical paraspinal injection amount:  10 unit(s)    L superior trapezius injection amount:  15 unit(s)    R superior trapezius injection amount:  15 unit(s)   Total Units      Total units used:  200   Post-procedure details      Chemodenervation:  Chronic migraine    Facial Nerve Location::  Bilateral facial nerve    Patient tolerance of procedure:  Tolerated well, no immediate complications   Comments       10 units scm bilaterally  10 units occipitalis  15 units left splenius  All medically necessary

## 2024-05-23 NOTE — TELEPHONE ENCOUNTER
Spoke with Blanca, her INR is 2.26.  We will continue the regimen as is, take Coumadin 5 mg Monday through Friday, then Coumadin 7 mg Saturday and Sunday.  We will recheck her counts next week to reassess her INR.  Patient voiced understanding.

## 2024-05-25 ENCOUNTER — HOSPITAL ENCOUNTER (EMERGENCY)
Facility: HOSPITAL | Age: 54
Discharge: HOME | End: 2024-05-25
Attending: EMERGENCY MEDICINE
Payer: MEDICARE

## 2024-05-25 ENCOUNTER — NURSE TRIAGE (OUTPATIENT)
Dept: OTHER | Facility: OTHER | Age: 54
End: 2024-05-25

## 2024-05-25 ENCOUNTER — APPOINTMENT (EMERGENCY)
Dept: RADIOLOGY | Facility: HOSPITAL | Age: 54
End: 2024-05-25
Payer: MEDICARE

## 2024-05-25 ENCOUNTER — APPOINTMENT (EMERGENCY)
Dept: VASCULAR ULTRASOUND | Facility: HOSPITAL | Age: 54
End: 2024-05-25
Payer: MEDICARE

## 2024-05-25 VITALS
HEART RATE: 82 BPM | BODY MASS INDEX: 47.09 KG/M2 | OXYGEN SATURATION: 95 % | SYSTOLIC BLOOD PRESSURE: 114 MMHG | RESPIRATION RATE: 20 BRPM | HEIGHT: 66 IN | TEMPERATURE: 98 F | DIASTOLIC BLOOD PRESSURE: 54 MMHG | WEIGHT: 293 LBS

## 2024-05-25 DIAGNOSIS — R07.9 CHEST PAIN: Primary | ICD-10-CM

## 2024-05-25 LAB
ALBUMIN SERPL BCP-MCNC: 4 G/DL (ref 3.5–5)
ALP SERPL-CCNC: 70 U/L (ref 34–104)
ALT SERPL W P-5'-P-CCNC: 18 U/L (ref 7–52)
ANION GAP SERPL CALCULATED.3IONS-SCNC: 12 MMOL/L (ref 4–13)
APTT PPP: 44 SECONDS (ref 23–37)
AST SERPL W P-5'-P-CCNC: 17 U/L (ref 13–39)
BASOPHILS # BLD AUTO: 0.06 THOUSANDS/ÂΜL (ref 0–0.1)
BASOPHILS NFR BLD AUTO: 1 % (ref 0–1)
BILIRUB SERPL-MCNC: 0.29 MG/DL (ref 0.2–1)
BNP SERPL-MCNC: 12 PG/ML (ref 0–100)
BUN SERPL-MCNC: 12 MG/DL (ref 5–25)
CALCIUM SERPL-MCNC: 9 MG/DL (ref 8.4–10.2)
CARDIAC TROPONIN I PNL SERPL HS: 3 NG/L
CHLORIDE SERPL-SCNC: 105 MMOL/L (ref 96–108)
CO2 SERPL-SCNC: 23 MMOL/L (ref 21–32)
CREAT SERPL-MCNC: 0.69 MG/DL (ref 0.6–1.3)
EOSINOPHIL # BLD AUTO: 0.09 THOUSAND/ÂΜL (ref 0–0.61)
EOSINOPHIL NFR BLD AUTO: 1 % (ref 0–6)
ERYTHROCYTE [DISTWIDTH] IN BLOOD BY AUTOMATED COUNT: 15.4 % (ref 11.6–15.1)
GFR SERPL CREATININE-BSD FRML MDRD: 100 ML/MIN/1.73SQ M
GLUCOSE SERPL-MCNC: 100 MG/DL (ref 65–140)
HCT VFR BLD AUTO: 43.8 % (ref 34.8–46.1)
HGB BLD-MCNC: 13.9 G/DL (ref 11.5–15.4)
IMM GRANULOCYTES # BLD AUTO: 0.08 THOUSAND/UL (ref 0–0.2)
IMM GRANULOCYTES NFR BLD AUTO: 1 % (ref 0–2)
INR PPP: 2.23 (ref 0.84–1.19)
LYMPHOCYTES # BLD AUTO: 2.08 THOUSANDS/ÂΜL (ref 0.6–4.47)
LYMPHOCYTES NFR BLD AUTO: 22 % (ref 14–44)
MCH RBC QN AUTO: 29.1 PG (ref 26.8–34.3)
MCHC RBC AUTO-ENTMCNC: 31.7 G/DL (ref 31.4–37.4)
MCV RBC AUTO: 92 FL (ref 82–98)
MONOCYTES # BLD AUTO: 0.79 THOUSAND/ÂΜL (ref 0.17–1.22)
MONOCYTES NFR BLD AUTO: 8 % (ref 4–12)
NEUTROPHILS # BLD AUTO: 6.42 THOUSANDS/ÂΜL (ref 1.85–7.62)
NEUTS SEG NFR BLD AUTO: 67 % (ref 43–75)
NRBC BLD AUTO-RTO: 0 /100 WBCS
PLATELET # BLD AUTO: 189 THOUSANDS/UL (ref 149–390)
PMV BLD AUTO: 10 FL (ref 8.9–12.7)
POTASSIUM SERPL-SCNC: 3.5 MMOL/L (ref 3.5–5.3)
PROT SERPL-MCNC: 7.2 G/DL (ref 6.4–8.4)
PROTHROMBIN TIME: 24.3 SECONDS (ref 11.6–14.5)
RBC # BLD AUTO: 4.78 MILLION/UL (ref 3.81–5.12)
SODIUM SERPL-SCNC: 140 MMOL/L (ref 135–147)
WBC # BLD AUTO: 9.52 THOUSAND/UL (ref 4.31–10.16)

## 2024-05-25 PROCEDURE — 93971 EXTREMITY STUDY: CPT

## 2024-05-25 PROCEDURE — 99285 EMERGENCY DEPT VISIT HI MDM: CPT

## 2024-05-25 PROCEDURE — 85730 THROMBOPLASTIN TIME PARTIAL: CPT | Performed by: EMERGENCY MEDICINE

## 2024-05-25 PROCEDURE — 85025 COMPLETE CBC W/AUTO DIFF WBC: CPT | Performed by: EMERGENCY MEDICINE

## 2024-05-25 PROCEDURE — 71045 X-RAY EXAM CHEST 1 VIEW: CPT

## 2024-05-25 PROCEDURE — 85610 PROTHROMBIN TIME: CPT | Performed by: EMERGENCY MEDICINE

## 2024-05-25 PROCEDURE — 99285 EMERGENCY DEPT VISIT HI MDM: CPT | Performed by: EMERGENCY MEDICINE

## 2024-05-25 PROCEDURE — 93005 ELECTROCARDIOGRAM TRACING: CPT

## 2024-05-25 PROCEDURE — 84484 ASSAY OF TROPONIN QUANT: CPT | Performed by: EMERGENCY MEDICINE

## 2024-05-25 PROCEDURE — 80053 COMPREHEN METABOLIC PANEL: CPT | Performed by: EMERGENCY MEDICINE

## 2024-05-25 PROCEDURE — 36415 COLL VENOUS BLD VENIPUNCTURE: CPT | Performed by: EMERGENCY MEDICINE

## 2024-05-25 PROCEDURE — 83880 ASSAY OF NATRIURETIC PEPTIDE: CPT | Performed by: EMERGENCY MEDICINE

## 2024-05-25 NOTE — ED PROVIDER NOTES
History  Chief Complaint   Patient presents with    Chest Pain     Arrives via EMS with c/o chest pain with mild shortness of breath starting approx an hour pta. Was sent here by her doctor also for eval of previous lump on R calf that has mostly resolved -- pt was concerned for possible blood clot as she states there is a family history of same.     53-year-old female comes in for evaluation of chest pain and shortness of breath.  Patient states approximately 1 hour ago she was started to have some shortness of breath and also some chest tightness.  States that she often has this because of her asthma history but is not sure if it has something more.  She also feels like she has a lump in the back of her calf and was concerned about having a blood clot.  Patient states that she is on Coumadin and gets checked weekly and the last time she checked her INR was normal.      History provided by:  Patient   used: No    Chest Pain  Pain location:  Substernal area  Pain quality: tightness    Pain radiates to:  Does not radiate  Pain radiates to the back: no    Pain severity:  Mild  Onset quality:  Sudden  Duration:  1 hour  Timing:  Constant  Progression:  Unchanged  Chronicity:  Recurrent  Context: at rest    Ineffective treatments:  None tried  Associated symptoms: shortness of breath    Associated symptoms: no abdominal pain, no back pain, no cough, no fatigue, no fever, no headache, no numbness and no palpitations    Risk factors: diabetes mellitus, hypertension, obesity and prior DVT/PE        Prior to Admission Medications   Prescriptions Last Dose Informant Patient Reported? Taking?   Cholecalciferol (Vitamin D3) 125 MCG (5000 UT) capsule   No No   Sig: Take 1 capsule (5,000 Units total) by mouth daily   OXcarbazepine (TRILEPTAL) 150 mg tablet  Self No No   Sig: Take 3 tablets (450 mg total) by mouth every 12 (twelve) hours   OXcarbazepine (TRILEPTAL) 300 mg tablet  Self Yes No   Sig: Take 300 mg  by mouth every 12 (twelve) hours   atoMOXetine (STRATTERA) 40 mg capsule  Self No No   Sig: Take 1 capsule (40 mg total) by mouth daily   atoMOXetine (STRATTERA) 60 mg capsule   Yes No   buPROPion (FORFIVO XL) 450 MG 24 hr tablet  Self No No   Sig: Take 1 tablet (450 mg total) by mouth daily   buPROPion (WELLBUTRIN XL) 150 mg 24 hr tablet   Yes No   cloNIDine (CATAPRES) 0.2 mg tablet   No No   Sig: TAKE 1 TABLET (0.2 MG TOTAL) BY MOUTH EVERY 12 (TWELVE) HOURS   cyanocobalamin (VITAMIN B-12) 1000 MCG tablet   No No   Sig: Take 1 tablet (1,000 mcg total) by mouth daily   furosemide (LASIX) 20 mg tablet   No No   Sig: Take 1 tablet (20 mg total) by mouth daily   levothyroxine (Euthyrox) 125 mcg tablet   No No   Sig: Take 1 tablet (125 mcg total) by mouth daily in the early morning   losartan (COZAAR) 100 MG tablet   No No   Sig: Take 1 tablet (100 mg total) by mouth daily   lurasidone (LATUDA) 40 mg tablet  Self Yes No   metFORMIN (GLUCOPHAGE-XR) 750 mg 24 hr tablet   No No   Sig: Take 1 tablet (750 mg total) by mouth daily with breakfast   naltrexone (REVIA) 50 mg tablet   No No   Sig: TAKE 1 TABLET (50 MG TOTAL) BY MOUTH DAILY   oxybutynin (DITROPAN) 5 mg tablet   No No   Sig: Take 1 tablet (5 mg total) by mouth 2 (two) times a day   paliperidone palmitate ER (INVEGA) 234 mg/1.5 mL IM injection  Self No No   Si.5 mL (234 mg total) by IM- Deltoid route every 4 weeks Do not start before 2024.   pantoprazole (PROTONIX) 40 mg tablet   No No   Sig: Take 1 tablet (40 mg total) by mouth daily in the early morning   pilocarpine (SALAGEN) 5 mg tablet   No No   Sig: Take 1 tablet (5 mg total) by mouth 3 (three) times a day   topiramate (TOPAMAX) 200 MG tablet  Self No No   Sig: Take 1 tablet (200 mg total) by mouth 2 (two) times a day   warfarin (COUMADIN) 5 mg tablet   No No   Sig: Take 1 tablet (5 mg total) by mouth daily   warfarin (Coumadin) 2 mg tablet   No No   Sig: Please take as discussed on the phone:  "Saturday and Sunday 7 mg once a day (Monday through Friday 5 mg once a day)      Facility-Administered Medications: None       Past Medical History:   Diagnosis Date    Acute deep vein thrombosis of lower leg, left ([facility]) 10/16/2023    Acute heart failure, unspecified heart failure type ([facility]) 05/06/2024    Anxiety     Arthritis     oa cassandra knees    Asthma     good control- no medications    Yan's esophagus     Bipolar affective disorder ([facility])     Cannabis abuse with unspecified 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[facility])     Chronic pain disorder     lumbar    Contusion of elbow 12/21/2021    COPD (chronic obstructive pulmonary disease) ([facility])     CPAP (continuous positive airway pressure) dependence     DDD (degenerative disc disease), lumbar 04/09/2015    Degenerative disc disease at L5-S1 level     Deliberate self-cutting     9/15/22per pt has not done recently-- does see a therapist and psychiatrist regularly    Depression 09/16/2008    Diarrhea 01/06/2022    Disease of thyroid gland     hypo    MARTINEZ (dyspnea on exertion)     per pt \"has had this with exertion and not new\"    Drug overdose 10/28/2008    suicide attempt and again drug overdose 7/2022--[facility] and than [facility]    Dysphagia     Dyspnea     Ecchymosis 01/06/2022    Edema     BLE    Elevated troponin 09/02/2023    Family history of blood clots     GERD (gastroesophageal reflux disease)     Grief 11/11/2023    Headache(784.0)     Headache, tension-type     Hemorrhage of rectum and anus 10/02/2017    Formatting of this note might be different from the original.  Added automatically from request for surgery [id number]    High blood pressure     History of COVID-19 12/30/2020    Symptoms started on 12/30/2020 and then got worse.  Today she is feeling a little bit better.  She is a candidate for monoclonal antibody infusion and set for 1/6/21 @ 1pm at [facility]. 01/07/21 - telemedicine -     Hyperlipidemia     Hypertension     " Intentional overdose (HCC) 09/27/2023    Intentional self-harm by unspecified sharp object, sequela (HCC) 02/09/2023    Knee pain, bilateral     Especially right    Knee pain, right 02/23/2022    Medial epicondylitis of elbow, left 04/03/2018    Formatting of this note might be different from the original.  Added automatically from request for surgery [id number]    Medial epicondylitis of right elbow 07/17/2023    Medical marijuana use     Memory difficulties 08/06/2020    Memory loss     Migraines     Obesity     Other psychoactive substance abuse with unspecified psychoactive substance-induced disorder (HCC) 05/06/2024    Overactive bladder     Polysubstance abuse ([facility]) 09/29/2023    Potential for cognitive impairment 06/17/2021    Primary osteoarthritis of both knees 08/08/2018    Pulmonary emboli ([facility])     Restrictive lung disease 02/01/2017    Last Assessment & Plan:   Formatting of this note might be different from the original.  I reviewed this problem throughout the rest of the note. Please refer to the other assessments for details.    Sjogren's disease ([facility])     Sleep apnea     Stress incontinence     Suicidal deliberate poisoning ([facility]) 07/13/2022    Suicidal ideations     Teeth missing     Toxic encephalopathy 11/08/2023    Tremor     per pt Tremors of Right Leg and both Arms    Visual impairment     Wears glasses        Past Surgical History:   Procedure Laterality Date    BREAST CYST EXCISION Right 1989    CARPAL TUNNEL RELEASE Left     CHOLECYSTECTOMY  05/2003    Laparoscopic    COLONOSCOPY      01/12/2009    DILATION AND CURETTAGE OF UTERUS      ELBOW SURGERY Left     x2. No hardware    ESOPHAGOGASTRODUODENOSCOPY      FOOT SURGERY Left     Plantar fasciotomy    HERNIA REPAIR      HYSTERECTOMY      laporoscopic, partial    KNEE ARTHROSCOPY Bilateral     OOPHORECTOMY Left 10/2015    CO GASTROCNEMIUS RECESSION Left 02/24/2021    Procedure: RECESSION GASTROC OPEN;  Surgeon: Nomi Yang DPM;  Location:  [facility] MAIN OR;  Service: Podiatry    WV RPR UMBILICAL HRNA 5 YRS/> REDUCIBLE N/A 04/24/2019    Procedure: REPAIR HERNIA UMBILICAL LAPAROSCOPIC W/ ROBOTICS;  Surgeon: Anahi Colon MD;  Location: [facility];  Service: General    WV SPHINCTEROTOMY ANAL DIVISION SPHINCTER SPX N/A 08/31/2018    Procedure: EUA, LEFT PARTIAL INTERNAL SPHINCTEROTOMY;  Surgeon: Tien Reyes MD;  Location: [facility];  Service: Colorectal    WV TNOT ELBOW LATERAL/MEDIAL DEBRIDE OPEN TDN RPR Left 09/20/2022    Procedure: RELEASE EPICONDYLAR ELBOW MEDIAL;  Surgeon: Kyree Winkler MD;  [facility];  Service: Orthopedics    REDUCTION MAMMAPLASTY Bilateral 1999    REPAIR LACERATION Left     left hand-5/18/2009    REPLACEMENT TOTAL KNEE Right     ROTATOR CUFF REPAIR Left     TONSILECTOMY AND ADNOIDECTOMY      US GUIDED MSK PROCEDURE  04/22/2021    VASCULAR SURGERY      VEIN LIGATION Bilateral     WISDOM TOOTH EXTRACTION         Family History   Problem Relation Age of Onset    Kidney cancer Mother     Diabetes Mother     Depression Mother     Stroke Mother     Arthritis Mother     Cancer Mother     Psychiatric Illness Mother     No Known Problems Father     No Known Problems Sister     No Known Problems Maternal Grandmother     No Known Problems Maternal Grandfather     No Known Problems Paternal Grandmother     No Known Problems Paternal Grandfather     Colon cancer Maternal Uncle     Colon cancer Maternal Uncle     Colon cancer Paternal Uncle     Colon cancer Family     Alcohol abuse Sister     Asthma Sister      I have reviewed and agree with the history as documented.    E-Cigarette/Vaping    E-Cigarette Use Never User     Comments medical THC      E-Cigarette/Vaping Substances    Nicotine No     THC Yes     CBD No     Flavoring No     Other No     Unknown No      Social History     Tobacco Use    Smoking status: Former     Current packs/day: 0.00     Average packs/day: 2.0 packs/day for 33.0 years (66.0 ttl pk-yrs)     Types:  Cigarettes     Start date: 1985     Quit date: 2018     Years since quittin.3    Smokeless tobacco: Never    Tobacco comments:     Started at age 16.   Vaping Use    Vaping status: Never Used   Substance Use Topics    Alcohol use: Not Currently     Comment: Recovering alcoholic    Drug use: Yes     Types: Marijuana, Methamphetamines     Comment: 2 months off meth       Review of Systems   Constitutional:  Negative for fatigue and fever.   HENT:  Negative for congestion and ear pain.    Eyes:  Negative for discharge and redness.   Respiratory:  Positive for shortness of breath. Negative for apnea, cough and wheezing.    Cardiovascular:  Positive for chest pain. Negative for palpitations.   Gastrointestinal:  Negative for abdominal pain and diarrhea.   Endocrine: Negative for cold intolerance and polydipsia.   Genitourinary:  Negative for difficulty urinating and hematuria.   Musculoskeletal:  Negative for arthralgias and back pain.   Skin:  Negative for color change and rash.   Allergic/Immunologic: Negative for environmental allergies and immunocompromised state.   Neurological:  Negative for numbness and headaches.   Hematological:  Negative for adenopathy. Does not bruise/bleed easily.   Psychiatric/Behavioral:  Negative for agitation and behavioral problems.        Physical Exam  Physical Exam  Vitals and nursing note reviewed.   Constitutional:       Appearance: Normal appearance. She is well-developed. She is not toxic-appearing.   HENT:      Head: Normocephalic and atraumatic.      Right Ear: Tympanic membrane and external ear normal.      Left Ear: Tympanic membrane and external ear normal.      Nose: Nose normal. No nasal deformity or rhinorrhea.      Mouth/Throat:      Dentition: Normal dentition.      Pharynx: Uvula midline.   Eyes:      General: Lids are normal.         Right eye: No discharge.         Left eye: No discharge.      Conjunctiva/sclera: Conjunctivae normal.      Pupils: Pupils  are equal, round, and reactive to light.   Neck:      Vascular: No carotid bruit or JVD.      Trachea: Trachea normal.   Cardiovascular:      Rate and Rhythm: Normal rate and regular rhythm. No extrasystoles are present.     Chest Wall: PMI is not displaced.      Pulses: Normal pulses.   Pulmonary:      Effort: Pulmonary effort is normal. No accessory muscle usage or respiratory distress.      Breath sounds: Normal breath sounds. No wheezing, rhonchi or rales.   Abdominal:      General: Bowel sounds are normal.      Palpations: Abdomen is soft. Abdomen is not rigid. There is no mass.      Tenderness: There is no abdominal tenderness. There is no guarding or rebound.   Musculoskeletal:      Right shoulder: No swelling, deformity or bony tenderness. Normal range of motion.      Cervical back: Normal range of motion and neck supple. No deformity, tenderness or bony tenderness.        Legs:    Lymphadenopathy:      Cervical: No cervical adenopathy.   Skin:     General: Skin is warm and dry.      Findings: No rash.   Neurological:      Mental Status: She is alert and oriented to person, place, and time.      GCS: GCS eye subscore is 4. GCS verbal subscore is 5. GCS motor subscore is 6.      Cranial Nerves: No cranial nerve deficit.      Sensory: No sensory deficit.      Deep Tendon Reflexes: Reflexes are normal and symmetric.   Psychiatric:         Speech: Speech normal.         Behavior: Behavior normal.         Vital Signs  ED Triage Vitals [05/25/24 1644]   Temperature Pulse Respirations Blood Pressure SpO2   98 °F (36.7 °C) 93 18 146/65 95 %      Temp Source Heart Rate Source Patient Position - Orthostatic VS BP Location FiO2 (%)   Oral Monitor Lying Left arm --      Pain Score       5           Vitals:    05/25/24 1730 05/25/24 1815 05/25/24 1830 05/25/24 1930   BP: 119/62 115/57 125/60 114/54   Pulse: 86 83 83 82   Patient Position - Orthostatic VS:    Lying         Visual Acuity      ED Medications  Medications -  No data to display    Diagnostic Studies  Results Reviewed       Procedure Component Value Units Date/Time    B-Type Natriuretic Peptide(BNP) [[id number]]  (Normal) Collected: 05/25/24 1656    Lab Status: Final result Specimen: Blood from Arm, Left Updated: 05/25/24 1801     BNP 12 pg/mL     HS Troponin 0hr (reflex protocol) [[id number]]  (Normal) Collected: 05/25/24 1656    Lab Status: Final result Specimen: Blood from Arm, Left Updated: 05/25/24 1726     hs TnI 0hr 3 ng/L     Comprehensive metabolic panel [[id number]] Collected: 05/25/24 1656    Lab Status: Final result Specimen: Blood from Arm, Left Updated: 05/25/24 1720     Sodium 140 mmol/L      Potassium 3.5 mmol/L      Chloride 105 mmol/L      CO2 23 mmol/L      ANION GAP 12 mmol/L      BUN 12 mg/dL      Creatinine 0.69 mg/dL      Glucose 100 mg/dL      Calcium 9.0 mg/dL      AST 17 U/L      ALT 18 U/L      Alkaline Phosphatase 70 U/L      Total Protein 7.2 g/dL      Albumin 4.0 g/dL      Total Bilirubin 0.29 mg/dL      eGFR 100 ml/min/1.73sq m     Narrative:      National Kidney Disease Foundation guidelines for Chronic Kidney Disease (CKD):     Stage 1 with normal or high GFR (GFR > 90 mL/min/1.73 square meters)    Stage 2 Mild CKD (GFR = 60-89 mL/min/1.73 square meters)    Stage 3A Moderate CKD (GFR = 45-59 mL/min/1.73 square meters)    Stage 3B Moderate CKD (GFR = 30-44 mL/min/1.73 square meters)    Stage 4 Severe CKD (GFR = 15-29 mL/min/1.73 square meters)    Stage 5 End Stage CKD (GFR <15 mL/min/1.73 square meters)  Note: GFR calculation is accurate only with a steady state creatinine    Protime-INR [[id number]]  (Abnormal) Collected: 05/25/24 1656    Lab Status: Final result Specimen: Blood from Arm, Left Updated: 05/25/24 1717     Protime 24.3 seconds      INR 2.23    APTT [[id number]]  (Abnormal) Collected: 05/25/24 1656    Lab Status: Final result Specimen: Blood from Arm, Left Updated: 05/25/24 1717     PTT 44 seconds     CBC and differential  [[id number]]  (Abnormal) Collected: 05/25/24 1656    Lab Status: Final result Specimen: Blood from Arm, Left Updated: 05/25/24 1703     WBC 9.52 Thousand/uL      RBC 4.78 Million/uL      Hemoglobin 13.9 g/dL      Hematocrit 43.8 %      MCV 92 fL      MCH 29.1 pg      MCHC 31.7 g/dL      RDW 15.4 %      MPV 10.0 fL      Platelets 189 Thousands/uL      nRBC 0 /100 WBCs      Segmented % 67 %      Immature Grans % 1 %      Lymphocytes % 22 %      Monocytes % 8 %      Eosinophils Relative 1 %      Basophils Relative 1 %      Absolute Neutrophils 6.42 Thousands/µL      Absolute Immature Grans 0.08 Thousand/uL      Absolute Lymphocytes 2.08 Thousands/µL      Absolute Monocytes 0.79 Thousand/µL      Eosinophils Absolute 0.09 Thousand/µL      Basophils Absolute 0.06 Thousands/µL                    XR chest 1 view portable   Final Result by Vicky Saenz MD (05/25 2001)      No acute cardiopulmonary disease.            Workstation performed: [id number]         VAS VENOUS DUPLEX -LOWER LIMB UNILATERAL    (Results Pending)              Procedures  ECG 12 Lead Documentation Only    Date/Time: 5/25/2024 4:48 PM    Performed by: Christina Gardner DO  Authorized by: Christina Gardner DO    Patient location:  ED  Previous ECG:     Previous ECG:  Compared to current    Similarity:  No change  Rate:     ECG rate:  86  Rhythm:     Rhythm: sinus rhythm    QRS:     QRS axis:  Left           ED Course             HEART Risk Score      Flowsheet Row Most Recent Value   Heart Score Risk Calculator    History 0 Filed at: 05/25/2024 1934   ECG 1 Filed at: 05/25/2024 1934   Age 1 Filed at: 05/25/2024 1934   Risk Factors 2 Filed at: 05/25/2024 1934   Troponin 0 Filed at: 05/25/2024 1934   HEART Score 4 Filed at: 05/25/2024 1934                          SBIRT 22yo+      Flowsheet Row Most Recent Value   Initial Alcohol Screen: US AUDIT-C     1. How often do you have a drink containing alcohol? 0 Filed at: 05/25/2024 1652   2. How many  drinks containing alcohol do you have on a typical day you are drinking?  0 Filed at: 05/25/2024 1652   3b. FEMALE Any Age, or MALE 65+: How often do you have 4 or more drinks on one occassion? 0 Filed at: 05/25/2024 1652   Audit-C Score 0 Filed at: 05/25/2024 1652   OLEG: How many times in the past year have you...    Used an illegal drug or used a prescription medication for non-medical reasons? Never Filed at: 05/25/2024 1652                      Medical Decision Making  Differential diagnosis includes but is not limited to asthma exacerbation, anxiety, DVT, musculoskeletal pain, less likely pneumothorax, pneumonia ACS or volume overload    Problems Addressed:  Chest pain: acute illness or injury    Amount and/or Complexity of Data Reviewed  Labs: ordered. Decision-making details documented in ED Course.  Radiology: ordered and independent interpretation performed.     Details: Chest x-ray within normal limits no pneumonia or pneumothorax  ECG/medicine tests: ordered and independent interpretation performed. Decision-making details documented in ED Course.    Risk  OTC drugs.  Prescription drug management.  Risk Details: Patient may take Tylenol for pain she should continue to continue all of her other outpatient medications.  He should follow-up with PCP and cardiology             Disposition  Final diagnoses:   Chest pain     Time reflects when diagnosis was documented in both MDM as applicable and the Disposition within this note       Time User Action Codes Description Comment    5/25/2024  7:34 PM Christina Gardner Add [R07.9] Chest pain           ED Disposition       ED Disposition   Discharge    Condition   Stable    Date/Time   Sat May 25, 2024 1934    Comment   Blanca Mason discharge to home/self care.                   Follow-up Information       Follow up With Specialties Details Why Contact Info Additional Information    JHONATAN Armando Nurse Practitioner Schedule an appointment as soon as  possible for a visit   [address] [place] [address]  [phone]       [facility] Cardiology [facility] Cardiology Schedule an appointment as soon as possible for a visit   [address]  [address]  [facility] [address] [phone] [facility], [address], [place], [place], [address] [phone]            Discharge Medication List as of 5/25/2024  7:42 PM        CONTINUE these medications which have NOT CHANGED    Details   atoMOXetine (STRATTERA) 60 mg capsule Historical Med      buPROPion (WELLBUTRIN XL) 150 mg 24 hr tablet Historical Med      Cholecalciferol (Vitamin D3) 125 MCG (5000 UT) capsule Take 1 capsule (5,000 Units total) by mouth daily, Starting Thu 4/4/2024, Normal      cloNIDine (CATAPRES) 0.2 mg tablet TAKE 1 TABLET (0.2 MG TOTAL) BY MOUTH EVERY 12 (TWELVE) HOURS, Starting Tue 5/14/2024, Until Sat 7/13/2024, Normal      cyanocobalamin (VITAMIN B-12) 1000 MCG tablet Take 1 tablet (1,000 mcg total) by mouth daily, Starting Thu 4/4/2024, Until Tue 10/1/2024, Normal      furosemide (LASIX) 20 mg tablet Take 1 tablet (20 mg total) by mouth daily, Starting Thu 4/4/2024, Normal      levothyroxine (Euthyrox) 125 mcg tablet Take 1 tablet (125 mcg total) by mouth daily in the early morning, Starting Thu 4/4/2024, Normal      losartan (COZAAR) 100 MG tablet Take 1 tablet (100 mg total) by mouth daily, Starting Thu 4/4/2024, Normal      lurasidone (LATUDA) 40 mg tablet Historical Med      metFORMIN (GLUCOPHAGE-XR) 750 mg 24 hr tablet Take 1 tablet (750 mg total) by mouth daily with breakfast, Starting Thu 4/4/2024, Normal      naltrexone (REVIA) 50 mg tablet TAKE 1 TABLET (50 MG TOTAL) BY MOUTH DAILY, Starting Tue 5/14/2024, Until Sat 7/13/2024, Normal      OXcarbazepine (TRILEPTAL) 300 mg tablet Take 300 mg by mouth every 12 (twelve) hours, Starting Fri 2/2/2024, Historical Med      oxybutynin (DITROPAN) 5 mg tablet Take 1 tablet (5  mg total) by mouth 2 (two) times a day, Starting Thu 4/4/2024, Until Fri 8/2/2024, Normal      paliperidone palmitate ER (INVEGA) 234 mg/1.5 mL IM injection 1.5 mL (234 mg total) by IM- Deltoid route every 4 weeks Do not start before February 28, 2024., Starting Wed 2/28/2024, No Print      pantoprazole (PROTONIX) 40 mg tablet Take 1 tablet (40 mg total) by mouth daily in the early morning, Starting Thu 4/4/2024, Normal      pilocarpine (SALAGEN) 5 mg tablet Take 1 tablet (5 mg total) by mouth 3 (three) times a day, Starting Mon 5/6/2024, Normal      topiramate (TOPAMAX) 200 MG tablet Take 1 tablet (200 mg total) by mouth 2 (two) times a day, Starting Tue 1/30/2024, Until Mon 5/6/2024, Normal      !! warfarin (Coumadin) 2 mg tablet Please take as discussed on the phone: Saturday and Sunday 7 mg once a day (Monday through Friday 5 mg once a day), Normal      !! warfarin (COUMADIN) 5 mg tablet Take 1 tablet (5 mg total) by mouth daily, Starting Wed 4/3/2024, Until Mon 9/30/2024, Normal       !! - Potential duplicate medications found. Please discuss with provider.          No discharge procedures on file.    PDMP Review         Value Time User    PDMP Reviewed  Yes 11/10/2023  2:54 PM JHONATAN Anne            ED Provider  Electronically Signed by             Christina Gardner DO  05/25/24 2056

## 2024-05-25 NOTE — TELEPHONE ENCOUNTER
"Regarding: lump on leg/possible blood clot  ----- Message from Amanda SMITH sent at 5/25/2024  3:20 PM EDT -----  Pt states \"Yesterday I noticed I had a lump on the back of my leg and today it is gone. I worried it may have been a blood clot\"    "

## 2024-05-25 NOTE — TELEPHONE ENCOUNTER
"Reason for Disposition  • Patient sounds very sick or weak to the triager    Answer Assessment - Initial Assessment Questions  1. APPEARANCE of SWELLING: \"What does it look like?\" (e.g., lymph node, insect bite, mole)      Patient states that yesterday she had a hard lump like pulled muscle in posterior right mid calf; denies redness; reports it was a little warm. Today, it resolved and there is just a little soft bump remaining  9/1/23 had a blood clot in her   2. SIZE: \"How large is the swelling?\" (inches, cm or compare to coins)      Size of a gumball  3. LOCATION: \"Where is the swelling located?\"      Posterior right mid calf    4. ONSET: \"When did the swelling start?\"      Yes    5. PAIN: \"Is it painful?\" If Yes, ask: \"How much?\"      Mild tenderness    6. ITCH: \"Does it itch?\" If Yes, ask: \"How much?\"      Denies    7. CAUSE: \"What do you think caused the swelling?\"      Blood clot?    8. OTHER SYMPTOMS: \"Do you have any other symptoms?\" (e.g., fever)      Denies; patient reports she has asthma. Has a little discomfort in the epigastric area    Protocols used: Skin Lump or Localized Swelling-ADULT-    "

## 2024-05-26 PROCEDURE — 93971 EXTREMITY STUDY: CPT | Performed by: SURGERY

## 2024-05-28 LAB
ATRIAL RATE: 86 BPM
P AXIS: 64 DEGREES
PR INTERVAL: 180 MS
QRS AXIS: -31 DEGREES
QRSD INTERVAL: 92 MS
QT INTERVAL: 356 MS
QTC INTERVAL: 426 MS
T WAVE AXIS: 60 DEGREES
VENTRICULAR RATE: 86 BPM

## 2024-05-28 PROCEDURE — 93010 ELECTROCARDIOGRAM REPORT: CPT | Performed by: INTERNAL MEDICINE

## 2024-05-29 ENCOUNTER — APPOINTMENT (OUTPATIENT)
Dept: LAB | Facility: CLINIC | Age: 54
End: 2024-05-29
Payer: MEDICARE

## 2024-05-29 ENCOUNTER — OFFICE VISIT (OUTPATIENT)
Dept: FAMILY MEDICINE CLINIC | Facility: CLINIC | Age: 54
End: 2024-05-29
Payer: MEDICARE

## 2024-05-29 VITALS
SYSTOLIC BLOOD PRESSURE: 140 MMHG | HEART RATE: 92 BPM | DIASTOLIC BLOOD PRESSURE: 90 MMHG | HEIGHT: 66 IN | TEMPERATURE: 97.9 F | WEIGHT: 293 LBS | BODY MASS INDEX: 47.09 KG/M2 | OXYGEN SATURATION: 96 %

## 2024-05-29 DIAGNOSIS — M35.00 SJOGREN'S SYNDROME, WITH UNSPECIFIED ORGAN INVOLVEMENT (HCC): ICD-10-CM

## 2024-05-29 DIAGNOSIS — F19.10 SUBSTANCE ABUSE (HCC): ICD-10-CM

## 2024-05-29 DIAGNOSIS — Z86.711 HISTORY OF PULMONARY EMBOLISM: ICD-10-CM

## 2024-05-29 DIAGNOSIS — Z79.01 ANTICOAGULATION MANAGEMENT ENCOUNTER: ICD-10-CM

## 2024-05-29 DIAGNOSIS — G89.29 CHRONIC MIDLINE LOW BACK PAIN WITHOUT SCIATICA: ICD-10-CM

## 2024-05-29 DIAGNOSIS — Z51.81 ANTICOAGULATION MANAGEMENT ENCOUNTER: ICD-10-CM

## 2024-05-29 DIAGNOSIS — E66.01 OBESITY, MORBID, BMI 50 OR HIGHER (HCC): ICD-10-CM

## 2024-05-29 DIAGNOSIS — M54.50 CHRONIC MIDLINE LOW BACK PAIN WITHOUT SCIATICA: ICD-10-CM

## 2024-05-29 DIAGNOSIS — R05.2 SUBACUTE COUGH: Primary | ICD-10-CM

## 2024-05-29 LAB
INR PPP: 2.42 (ref 0.84–1.19)
PROTHROMBIN TIME: 25.9 SECONDS (ref 11.6–14.5)

## 2024-05-29 PROCEDURE — 99214 OFFICE O/P EST MOD 30 MIN: CPT

## 2024-05-29 PROCEDURE — 36415 COLL VENOUS BLD VENIPUNCTURE: CPT

## 2024-05-29 PROCEDURE — 85610 PROTHROMBIN TIME: CPT

## 2024-05-29 RX ORDER — LEVOCETIRIZINE DIHYDROCHLORIDE 5 MG/1
5 TABLET, FILM COATED ORAL EVERY EVENING
Qty: 90 TABLET | Refills: 0 | Status: SHIPPED | OUTPATIENT
Start: 2024-05-29

## 2024-05-29 RX ORDER — LIDOCAINE 50 MG/G
1 PATCH TOPICAL DAILY
Qty: 30 PATCH | Refills: 0 | Status: SHIPPED | OUTPATIENT
Start: 2024-05-29

## 2024-05-29 NOTE — PROGRESS NOTES
Ambulatory Visit  Name: Blanca Mason      : 1970      MRN: [id number]  Encounter Provider: JHONATAN Armando  Encounter Date: 2024   Encounter department: [facility]    Assessment & Plan   1. Subacute cough  -     levocetirizine (XYZAL) 5 MG tablet; Take 1 tablet (5 mg total) by mouth every evening  2. Substance abuse ([facility])  Assessment & Plan:  Reported not using methamphetamines since 2023   3. Sjogren's syndrome, with unspecified organ involvement ([facility])  Assessment & Plan:  Current medication: Pilocarpine 5mg TID   4. Chronic midline low back pain without sciatica  Assessment & Plan:  Low back pain   Lidocaine patches ordered   Exercises encouraged.   Orders:  -     lidocaine (Lidoderm) 5 %; Apply 1 patch topically over 12 hours daily Remove & Discard patch within 12 hours or as directed by MD  5. Obesity, morbid, BMI 50 or higher ([facility])  Assessment & Plan:  Current weight: 307lbs   BMI: 49.68  Seeing weight management   Current medications: Naltrexone 50mg   Continue current medication regimen          History of Present Illness     Cough  This is a recurrent problem. The current episode started 1 to 4 weeks ago. The problem has been unchanged. The problem occurs constantly. The cough is Non-productive. Pertinent negatives include no chest pain, chills, ear congestion, ear pain, fever, headaches, heartburn, hemoptysis, myalgias, nasal congestion, postnasal drip, rash, rhinorrhea, sore throat, shortness of breath, sweats, weight loss or wheezing. Nothing aggravates the symptoms. She has tried rest and cool air for the symptoms. The treatment provided no relief. There is no history of asthma, bronchiectasis, bronchitis, COPD, emphysema, environmental allergies or pneumonia.   Back Pain  This is a chronic problem. The current episode started more than 1 year ago. The problem occurs constantly. The problem has been gradually worsening since onset. The pain  is present in the lumbar spine. The quality of the pain is described as aching. The pain does not radiate. The pain is moderate. The pain is The same all the time. The symptoms are aggravated by bending, standing, sitting, stress and twisting. Stiffness is present In the morning. Associated symptoms include weakness. Pertinent negatives include no abdominal pain, bladder incontinence, bowel incontinence, chest pain, dysuria, fever, headaches, numbness, paresis, paresthesias, pelvic pain, perianal numbness, tingling or weight loss. Risk factors include obesity. She has tried nothing for the symptoms. The treatment provided no relief.     Review of Systems   Constitutional:  Negative for activity change, chills, fatigue, fever and weight loss.   HENT:  Negative for congestion, ear pain, postnasal drip, rhinorrhea, sore throat and trouble swallowing.    Eyes:  Negative for pain and visual disturbance.   Respiratory:  Positive for cough. Negative for hemoptysis, chest tightness, shortness of breath and wheezing.    Cardiovascular:  Negative for chest pain, palpitations and leg swelling.   Gastrointestinal:  Negative for abdominal pain, bowel incontinence, constipation, diarrhea, heartburn, nausea and vomiting.   Genitourinary:  Negative for bladder incontinence, difficulty urinating, dysuria, hematuria, pelvic pain and urgency.   Musculoskeletal:  Positive for back pain. Negative for arthralgias and myalgias.   Skin:  Negative for color change and rash.   Allergic/Immunologic: Negative for environmental allergies.   Neurological:  Positive for weakness. Negative for dizziness, tingling, seizures, syncope, numbness, headaches and paresthesias.   Psychiatric/Behavioral:  Negative for dysphoric mood. The patient is not nervous/anxious.    All other systems reviewed and are negative.    Past Medical History:   Diagnosis Date    Acute deep vein thrombosis of lower leg, left (HCC) 10/16/2023    Acute heart failure,  "unspecified heart failure type (HCC) 05/06/2024    Anxiety     Arthritis     oa cassandra knees    Asthma     good control- no medications    Yan's esophagus     Bipolar affective disorder (HCC)     Cannabis abuse with unspecified 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HCC)     Chronic pain disorder     lumbar    Contusion of elbow 12/21/2021    COPD (chronic obstructive pulmonary disease) (HCC)     CPAP (continuous positive airway pressure) dependence     DDD (degenerative disc disease), lumbar 04/09/2015    Degenerative disc disease at L5-S1 level     Deliberate self-cutting     9/15/22per pt has not done recently-- does see a therapist and psychiatrist regularly    Depression 09/16/2008    Diarrhea 01/06/2022    Disease of thyroid gland     hypo    MARTINEZ (dyspnea on exertion)     per pt \"has had this with exertion and not new\"    Drug overdose 10/28/2008    suicide attempt and again drug overdose 7/2022--[facility] and than transferred to [facility]    Dysphagia     Dyspnea     Ecchymosis 01/06/2022    Edema     BLE    Elevated troponin 09/02/2023    Family history of blood clots     GERD (gastroesophageal reflux disease)     Grief 11/11/2023    Headache(784.0)     Headache, tension-type     Hemorrhage of rectum and anus 10/02/2017    Formatting of this note might be different from the original.  Added automatically from request for surgery [id number]    High blood pressure     History of COVID-19 12/30/2020    Symptoms started on 12/30/2020 and then got worse.  Today she is feeling a little bit better.  She is a candidate for monoclonal antibody infusion and set for 1/6/21 @ 1pm at [facility]. 01/07/21 - telemedicine -     Hyperlipidemia     Hypertension     Intentional overdose (HCC) 09/27/2023    Intentional self-harm by unspecified sharp object, sequela (HCC) 02/09/2023    Knee pain, bilateral     Especially right    Knee pain, right 02/23/2022    Medial epicondylitis of elbow, left 04/03/2018    Formatting of this " note might be different from the original.  Added automatically from request for surgery [id number]    Medial epicondylitis of right elbow 07/17/2023    Medical marijuana use     Memory difficulties 08/06/2020    Memory loss     Migraines     Obesity     Other psychoactive substance abuse with unspecified psychoactive substance-induced disorder (HCC) 05/06/2024    Overactive bladder     Polysubstance abuse (HCC) 09/29/2023    Potential for cognitive impairment 06/17/2021    Primary osteoarthritis of both knees 08/08/2018    Pulmonary emboli (HCC)     Restrictive lung disease 02/01/2017    Last Assessment & Plan:   Formatting of this note might be different from the original.  I reviewed this problem throughout the rest of the note. Please refer to the other assessments for details.    Sjogren's disease (HCC)     Sleep apnea     Stress incontinence     Suicidal deliberate poisoning (HCC) 07/13/2022    Suicidal ideations     Teeth missing     Toxic encephalopathy 11/08/2023    Tremor     per pt Tremors of Right Leg and both Arms    Visual impairment     Wears glasses      Past Surgical History:   Procedure Laterality Date    BREAST CYST EXCISION Right 1989    CARPAL TUNNEL RELEASE Left     CHOLECYSTECTOMY  05/2003    Laparoscopic    COLONOSCOPY      01/12/2009    DILATION AND CURETTAGE OF UTERUS      ELBOW SURGERY Left     x2. No hardware    ESOPHAGOGASTRODUODENOSCOPY      FOOT SURGERY Left     Plantar fasciotomy    HERNIA REPAIR      HYSTERECTOMY      laporoscopic, partial    KNEE ARTHROSCOPY Bilateral     OOPHORECTOMY Left 10/2015    ME GASTROCNEMIUS RECESSION Left 02/24/2021    Procedure: RECESSION GASTROC OPEN;  Surgeon: Nomi Yang DPM;  Location:  MAIN OR;  Service: Podiatry    ME RPR UMBILICAL HRNA 5 YRS/> REDUCIBLE N/A 04/24/2019    Procedure: REPAIR HERNIA UMBILICAL LAPAROSCOPIC W/ ROBOTICS;  Surgeon: Anahi Colon MD;  Location: [facility] Main OR;  Service: General    ME SPHINCTEROTOMY ANAL DIVISION  SPHINCTER SPX N/A 2018    Procedure: EUA, LEFT PARTIAL INTERNAL SPHINCTEROTOMY;  Surgeon: Tien Reyes MD;  Location: [facility] MAIN OR;  Service: Colorectal    DE TNOT ELBOW LATERAL/MEDIAL DEBRIDE OPEN TDN RPR Left 2022    Procedure: RELEASE EPICONDYLAR ELBOW MEDIAL;  Surgeon: Kyree Winkler MD;  [facility];  Service: Orthopedics    REDUCTION MAMMAPLASTY Bilateral 1999    REPAIR LACERATION Left     left hand-2009    REPLACEMENT TOTAL KNEE Right     ROTATOR CUFF REPAIR Left     TONSILECTOMY AND ADNOIDECTOMY      US GUIDED MSK PROCEDURE  2021    VASCULAR SURGERY      VEIN LIGATION Bilateral     WISDOM TOOTH EXTRACTION       Family History   Problem Relation Age of Onset    Kidney cancer Mother     Diabetes Mother     Depression Mother     Stroke Mother     Arthritis Mother     Cancer Mother     Psychiatric Illness Mother     No Known Problems Father     No Known Problems Sister     No Known Problems Maternal Grandmother     No Known Problems Maternal Grandfather     No Known Problems Paternal Grandmother     No Known Problems Paternal Grandfather     Colon cancer Maternal Uncle     Colon cancer Maternal Uncle     Colon cancer Paternal Uncle     Colon cancer Family     Alcohol abuse Sister     Asthma Sister      Social History     Tobacco Use    Smoking status: Former     Current packs/day: 0.00     Average packs/day: 2.0 packs/day for 33.0 years (66.0 ttl pk-yrs)     Types: Cigarettes     Start date: 1985     Quit date: 2018     Years since quittin.4    Smokeless tobacco: Never    Tobacco comments:     Started at age 16.   Vaping Use    Vaping status: Never Used   Substance and Sexual Activity    Alcohol use: Not Currently     Comment: Recovering alcoholic    Drug use: Yes     Types: Marijuana, Methamphetamines     Comment: 2 months off meth    Sexual activity: Not Currently     Partners: Male     Comment: defer     Current Outpatient Medications on File Prior to Visit    Medication Sig    atoMOXetine (STRATTERA) 40 mg capsule Take 1 capsule (40 mg total) by mouth daily    atoMOXetine (STRATTERA) 60 mg capsule     buPROPion (FORFIVO XL) 450 MG 24 hr tablet Take 1 tablet (450 mg total) by mouth daily    buPROPion (WELLBUTRIN XL) 150 mg 24 hr tablet     Cholecalciferol (Vitamin D3) 125 MCG (5000 UT) capsule Take 1 capsule (5,000 Units total) by mouth daily    cloNIDine (CATAPRES) 0.2 mg tablet TAKE 1 TABLET (0.2 MG TOTAL) BY MOUTH EVERY 12 (TWELVE) HOURS    cyanocobalamin (VITAMIN B-12) 1000 MCG tablet Take 1 tablet (1,000 mcg total) by mouth daily    furosemide (LASIX) 20 mg tablet Take 1 tablet (20 mg total) by mouth daily    levothyroxine (Euthyrox) 125 mcg tablet Take 1 tablet (125 mcg total) by mouth daily in the early morning    losartan (COZAAR) 100 MG tablet Take 1 tablet (100 mg total) by mouth daily    lurasidone (LATUDA) 40 mg tablet     metFORMIN (GLUCOPHAGE-XR) 750 mg 24 hr tablet Take 1 tablet (750 mg total) by mouth daily with breakfast    naltrexone (REVIA) 50 mg tablet TAKE 1 TABLET (50 MG TOTAL) BY MOUTH DAILY    OXcarbazepine (TRILEPTAL) 150 mg tablet Take 3 tablets (450 mg total) by mouth every 12 (twelve) hours    OXcarbazepine (TRILEPTAL) 300 mg tablet Take 300 mg by mouth every 12 (twelve) hours    oxybutynin (DITROPAN) 5 mg tablet Take 1 tablet (5 mg total) by mouth 2 (two) times a day    paliperidone palmitate ER (INVEGA) 234 mg/1.5 mL IM injection 1.5 mL (234 mg total) by IM- Deltoid route every 4 weeks Do not start before February 28, 2024.    pantoprazole (PROTONIX) 40 mg tablet Take 1 tablet (40 mg total) by mouth daily in the early morning    pilocarpine (SALAGEN) 5 mg tablet Take 1 tablet (5 mg total) by mouth 3 (three) times a day    topiramate (TOPAMAX) 200 MG tablet Take 1 tablet (200 mg total) by mouth 2 (two) times a day    warfarin (Coumadin) 2 mg tablet Please take as discussed on the phone: Saturday and Sunday 7 mg once a day (Monday through  "Friday 5 mg once a day)    warfarin (COUMADIN) 5 mg tablet Take 1 tablet (5 mg total) by mouth daily     Allergies   Allergen Reactions    Percocet [Oxycodone-Acetaminophen] Headache     Severe headaches   (denies issues with Tylenol)    Povidone Iodine Rash     Unsure if betadine or gauze post surgical. Got rash on leg.   Has  Had itchy rashes after every surgery prep and IV insertion    Chlorhexidine Rash     Per pt \"able to use the liquid soap--thinkd reaction from the sponges or wipes\"     Immunization History   Administered Date(s) Administered    COVID-19 PFIZER VACCINE 0.3 ML IM 03/31/2021, 04/21/2021, 12/13/2021, 05/09/2022    COVID-19 Pfizer vac (Edwar-sucrose, gray cap) 12 yr+ IM 05/09/2022    INFLUENZA 08/07/2014, 07/29/2015, 10/10/2018, 11/10/2022, 10/18/2023    Influenza Split 07/29/2015    Influenza, injectable, quadrivalent, preservative free 0.5 mL 10/18/2023    Influenza, recombinant, quadrivalent,injectable, preservative free 11/27/2018, 09/20/2021, 11/10/2022    Tdap 01/26/2009, 07/23/2018    Tuberculin Skin Test-PPD Intradermal 12/10/2018, 01/26/2022     Objective     /90 (BP Location: Left arm, Patient Position: Sitting, Cuff Size: Standard)   Pulse 92   Temp 97.9 °F (36.6 °C) (Temporal)   Ht 5' 6\" (1.676 m)   Wt (!) 140 kg (307 lb 12.8 oz)   SpO2 96%   BMI 49.68 kg/m²     Physical Exam  Vitals and nursing note reviewed.   Constitutional:       General: She is not in acute distress.     Appearance: Normal appearance. She is well-developed.   HENT:      Head: Normocephalic and atraumatic.      Right Ear: Tympanic membrane, ear canal and external ear normal. There is no impacted cerumen.      Left Ear: Tympanic membrane, ear canal and external ear normal. There is no impacted cerumen.      Nose: Nose normal.      Mouth/Throat:      Mouth: Mucous membranes are moist.   Eyes:      Extraocular Movements: Extraocular movements intact.      Conjunctiva/sclera: Conjunctivae normal.      " Pupils: Pupils are equal, round, and reactive to light.   Cardiovascular:      Rate and Rhythm: Normal rate and regular rhythm.      Pulses: Normal pulses.      Heart sounds: Normal heart sounds. No murmur heard.  Pulmonary:      Effort: Pulmonary effort is normal. No respiratory distress.      Breath sounds: Normal breath sounds.   Abdominal:      General: Bowel sounds are normal.      Palpations: Abdomen is soft.      Tenderness: There is no abdominal tenderness.   Musculoskeletal:         General: Normal range of motion.      Cervical back: Normal range of motion and neck supple.      Right lower leg: No edema.      Left lower leg: No edema.   Skin:     General: Skin is warm and dry.      Capillary Refill: Capillary refill takes less than 2 seconds.   Neurological:      General: No focal deficit present.      Mental Status: She is alert and oriented to person, place, and time. Mental status is at baseline.   Psychiatric:         Mood and Affect: Mood normal.         Behavior: Behavior normal.         Thought Content: Thought content normal.         Judgment: Judgment normal.       Administrative Statements   Patient Instructions   Lower Back Exercises   WHAT YOU NEED TO KNOW:   Lower back exercises help heal and strengthen your back muscles to prevent another injury. Ask your healthcare provider if you need to see a physical therapist for more advanced exercises.   DISCHARGE INSTRUCTIONS:   Return to the emergency department if:   You have severe pain that prevents you from moving.       Call your doctor if:   Your pain becomes worse.    You have new pain.    You have questions or concerns about your condition or care.    Do lower back exercises safely:   Do the exercises on a mat or firm surface (not on a bed).  A firm surface will support your spine and prevent low back pain.    Move slowly and smoothly.  Avoid fast or jerky motions.    Breathe normally.  Do not hold your breath.    Stop if you feel pain.  It  is normal to feel some discomfort at first, but you should not feel pain. Regular exercise will help decrease your discomfort over time.    Lower back exercises:  Your healthcare provider may recommend that you do back exercises 10 to 30 minutes each day. He or she may also recommend that you do exercises 1 to 3 times each day. Ask your provider which exercises are best for you and how often to do them.  Ankle pumps:  Lie on your back. Move your foot up (with your toes pointing toward your head). Then, move your foot down (with your toes pointing away from you). Repeat this exercise 10 times on each side.         Heel slides:  Lie on your back. Slowly bend one leg and then straighten it. Next, bend the other leg and then straighten it. Repeat 10 times on each side.         Pelvic tilt:  Lie on your back with your knees bent and feet flat on the floor. Place your arms in a relaxed position beside your body. Tighten the muscles of your abdomen and flatten your back against the floor. Hold for 5 seconds. Repeat 5 times.         Back stretch:  Lie on your back with your hands behind your head. Bend your knees and turn the lower half of your body to one side. Hold this position for 10 seconds. Repeat 3 times on each side.         Straight leg raises:  Lie on your back with one leg straight. Bend the other knee. Tighten your abdomen and then slowly lift the straight leg up about 6 to 12 inches off the floor. Hold for 1 to 5 seconds. Lower your leg slowly. Repeat 10 times on each leg.         Knee-to-chest:  Lie on your back with your knees bent and feet flat on the floor. Pull one of your knees toward your chest and hold it there for 5 seconds. Return your leg to the starting position. Lift the other knee toward your chest and hold for 5 seconds. Do this 5 times on each side.         Cat and camel:  Place your hands and knees on the floor. Arch your back upward toward the ceiling and lower your head. Round out your spine  as much as you can. Hold for 5 seconds. Lift your head upward and push your chest downward toward the floor. Hold for 5 seconds. Do 3 sets or as directed.         Wall squats:  Stand with your back against a wall. Tighten the muscles of your abdomen. Slowly lower your body until your knees are bent at a 45 degree angle. Hold this position for 5 seconds. Slowly move back up to a standing position. Repeat 10 times.         Curl up:  Lie on your back with your knees bent and feet flat on the floor. Place your hands, palms down, underneath the curve in your lower back. Next, with your elbows on the floor, lift your shoulders and chest 2 to 3 inches. Keep your head in line with your shoulders. Hold this position for 5 seconds. When you can do this exercise without pain for 10 to 15 seconds, you may add a rotation. While your shoulders and chest are lifted off the ground, turn slightly to the left and hold. Repeat on the other side.         Bird dog:  Place your hands and knees on the floor. Keep your wrists directly below your shoulders and your knees directly below your hips. Pull your belly button in toward your spine. Do not flatten or arch your back. Tighten your abdominal muscles. Raise one arm straight out so that it is aligned with your head. Next, raise the leg opposite your arm. Hold this position for 15 seconds. Lower your arm and leg slowly and change sides. Do 5 sets.       Follow up with your doctor as directed:  Write down your questions so you remember to ask them during your visits.  © Copyright [employer] 2023 Information is for End User's use only and may not be sold, redistributed or otherwise used for commercial purposes.  The above information is an  only. It is not intended as medical advice for individual conditions or treatments. Talk to your doctor, nurse or pharmacist before following any medical regimen to see if it is safe and effective for you.

## 2024-05-30 ENCOUNTER — TELEPHONE (OUTPATIENT)
Dept: GYNECOLOGIC ONCOLOGY | Facility: CLINIC | Age: 54
End: 2024-05-30

## 2024-05-30 NOTE — TELEPHONE ENCOUNTER
Informed Blanca her INR was therapeutic, she will continue taking coumadin 5 mg Monday through Friday and Coumadin 7 mg Saturday and Sunday.  We will recheck it next week.  Patient voiced understanding.

## 2024-05-30 NOTE — ASSESSMENT & PLAN NOTE
Current weight: 307lbs   BMI: 49.68  Seeing weight management   Current medications: Naltrexone 50mg   Continue current medication regimen

## 2024-06-04 ENCOUNTER — TELEMEDICINE (OUTPATIENT)
Dept: NEUROLOGY | Facility: CLINIC | Age: 54
End: 2024-06-04
Payer: MEDICARE

## 2024-06-04 ENCOUNTER — TELEPHONE (OUTPATIENT)
Dept: NEUROLOGY | Facility: CLINIC | Age: 54
End: 2024-06-04

## 2024-06-04 DIAGNOSIS — F25.0 SCHIZOAFFECTIVE DISORDER, BIPOLAR TYPE (HCC): Chronic | ICD-10-CM

## 2024-06-04 DIAGNOSIS — G43.709 CHRONIC MIGRAINE WITHOUT AURA WITHOUT STATUS MIGRAINOSUS, NOT INTRACTABLE: Primary | ICD-10-CM

## 2024-06-04 PROCEDURE — 99213 OFFICE O/P EST LOW 20 MIN: CPT | Performed by: PHYSICIAN ASSISTANT
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
## 2024-06-04 NOTE — ASSESSMENT & PLAN NOTE
Preventative:  Continue Botox every 3 months  Continue all medications from other providers    Abortive:  Please call us if you are unable to break your headaches or would like to have another medication as a rescue

## 2024-06-05 ENCOUNTER — TELEPHONE (OUTPATIENT)
Dept: HEMATOLOGY ONCOLOGY | Facility: CLINIC | Age: 54
End: 2024-06-05

## 2024-06-05 ENCOUNTER — ANNUAL EXAM (OUTPATIENT)
Dept: OBGYN CLINIC | Facility: CLINIC | Age: 54
End: 2024-06-05
Payer: MEDICARE

## 2024-06-05 ENCOUNTER — APPOINTMENT (OUTPATIENT)
Dept: LAB | Facility: CLINIC | Age: 54
End: 2024-06-05
Payer: MEDICARE

## 2024-06-05 VITALS
DIASTOLIC BLOOD PRESSURE: 90 MMHG | BODY MASS INDEX: 47.09 KG/M2 | WEIGHT: 293 LBS | HEIGHT: 66 IN | SYSTOLIC BLOOD PRESSURE: 140 MMHG

## 2024-06-05 DIAGNOSIS — Z79.01 ANTICOAGULATION MANAGEMENT ENCOUNTER: Primary | ICD-10-CM

## 2024-06-05 DIAGNOSIS — Z86.718 HISTORY OF DVT (DEEP VEIN THROMBOSIS): ICD-10-CM

## 2024-06-05 DIAGNOSIS — F33.2 SEVERE EPISODE OF RECURRENT MAJOR DEPRESSIVE DISORDER, WITHOUT PSYCHOTIC FEATURES (HCC): ICD-10-CM

## 2024-06-05 DIAGNOSIS — Z51.81 ANTICOAGULATION MANAGEMENT ENCOUNTER: Primary | ICD-10-CM

## 2024-06-05 DIAGNOSIS — Z01.419 ENCOUNTER FOR ANNUAL ROUTINE GYNECOLOGICAL EXAMINATION: Primary | ICD-10-CM

## 2024-06-05 DIAGNOSIS — Z79.01 ANTICOAGULATION MANAGEMENT ENCOUNTER: ICD-10-CM

## 2024-06-05 DIAGNOSIS — Z51.81 ANTICOAGULATION MANAGEMENT ENCOUNTER: ICD-10-CM

## 2024-06-05 DIAGNOSIS — F25.0 SCHIZOAFFECTIVE DISORDER, BIPOLAR TYPE (HCC): Chronic | ICD-10-CM

## 2024-06-05 DIAGNOSIS — Z86.711 HISTORY OF PULMONARY EMBOLISM: ICD-10-CM

## 2024-06-05 DIAGNOSIS — Z12.31 ENCOUNTER FOR SCREENING MAMMOGRAM FOR BREAST CANCER: ICD-10-CM

## 2024-06-05 DIAGNOSIS — Z90.711 HISTORY OF HYSTERECTOMY, SUPRACERVICAL: ICD-10-CM

## 2024-06-05 LAB
INR PPP: 2.39 (ref 0.84–1.19)
PROTHROMBIN TIME: 25.6 SECONDS (ref 11.6–14.5)

## 2024-06-05 PROCEDURE — G0145 SCR C/V CYTO,THINLAYER,RESCR: HCPCS | Performed by: OBSTETRICS & GYNECOLOGY

## 2024-06-05 PROCEDURE — 85610 PROTHROMBIN TIME: CPT

## 2024-06-05 PROCEDURE — 36415 COLL VENOUS BLD VENIPUNCTURE: CPT

## 2024-06-05 PROCEDURE — G0101 CA SCREEN;PELVIC/BREAST EXAM: HCPCS | Performed by: OBSTETRICS & GYNECOLOGY

## 2024-06-05 NOTE — TELEPHONE ENCOUNTER
Spoke with patient, she has been taking coumadin 5 mg Monday through Friday, Coumadin 7 mg Saturday and Sunday.  Her INR has remained therapeutic, today it is 2.39.  Informed patient we will continue Coumadin the same way as she has been taking it.  At this point, we can check her INR every two weeks as it has remained therapeutic.  Patient agreeable.  New orders placed.

## 2024-06-05 NOTE — PROGRESS NOTES
Ambulatory Visit  Name: Blanca Mason      : 1970      MRN: [id number]  Encounter Provider: Zaira Talamantes DO  Encounter Date: 2024   Encounter department: [facility]    Assessment & Plan   1. Encounter for annual routine gynecological examination  -     Liquid-based pap, screening  2. Encounter for screening mammogram for breast cancer  -     Mammo screening bilateral w 3d & cad; Future  3. Schizoaffective disorder, bipolar type (HCC)  4. Severe episode of recurrent major depressive disorder, without psychotic features (HCC)  5. History of pulmonary embolism  6. History of DVT (deep vein thrombosis)  7. History of hysterectomy, supracervical    Pap smear done for cytology, reflex HPV.  Encouraged self breast examination as well as calcium supplementation.  Continue annual mammogram.  Reviewed colon cancer screening, up-to-date.  Patient will remain off estrogen replacement therapy, discussed contraindication given recent pulmonary embolism.  All questions answered.  She will continue to follow-up with her hematologist, primary care, psychiatrist/therapist as scheduled.  Return to office in 1 year or as needed this is a pleasant 53-year-old female G0 who presents for GYN exam.  Her GYN history significant for supracervical hysterectomy    History of Present Illness       Blanca Mason is a 53 y.o. female who presents     , Unilateral salpingo-oophorectomy in her early 40s due to irregular bleeding.  She has been on oral estrogen replacement therapy and discontinued in the course of the year she was diagnosed with pulmonary embolism bilateral, hospitalized for 2 weeks.  She is on Coumadin rather than any of the other blood thinners due to interaction with her prior medication.  This is managed through her otologist.    She does follow-up with psychiatry and therapist on a weekly basis.  She will also was hospitalized  inpatient behavioral medicine with adjustment in medication.   She feels she is doing better.  Patient has a history of cutting and has not done so since she started Coumadin.  She is scheduled for foot surgery 8/2024        H/o PE B/L 9/1/23 coumadin      Colon 2 to 3 years ago follow-up 5 years    Mammo    Pap        Review of Systems   Constitutional:  Negative for fatigue, fever and unexpected weight change.   Respiratory:  Negative for cough, chest tightness, shortness of breath and wheezing.    Cardiovascular: Negative.  Negative for chest pain and palpitations.   Gastrointestinal: Negative.  Negative for abdominal distention, abdominal pain, blood in stool, constipation, diarrhea, nausea and vomiting.   Genitourinary: Negative.  Negative for difficulty urinating, dyspareunia, dysuria, flank pain, frequency, genital sores, hematuria, pelvic pain, urgency, vaginal bleeding, vaginal discharge and vaginal pain.   Skin:  Negative for rash.       Current Outpatient Medications on File Prior to Visit   Medication Sig Dispense Refill    Cholecalciferol (Vitamin D3) 125 MCG (5000 UT) capsule Take 1 capsule (5,000 Units total) by mouth daily 90 capsule 1    losartan (COZAAR) 100 MG tablet Take 1 tablet (100 mg total) by mouth daily 90 tablet 1    atoMOXetine (STRATTERA) 40 mg capsule Take 1 capsule (40 mg total) by mouth daily (Patient not taking: Reported on 6/4/2024) 30 capsule 1    atoMOXetine (STRATTERA) 60 mg capsule Take 60 mg by mouth daily      buPROPion (FORFIVO XL) 450 MG 24 hr tablet Take 1 tablet (450 mg total) by mouth daily (Patient not taking: Reported on 6/4/2024) 30 tablet 1    buPROPion (WELLBUTRIN XL) 150 mg 24 hr tablet       cloNIDine (CATAPRES) 0.2 mg tablet TAKE 1 TABLET (0.2 MG TOTAL) BY MOUTH EVERY 12 (TWELVE) HOURS 60 tablet 0    cyanocobalamin (VITAMIN B-12) 1000 MCG tablet Take 1 tablet (1,000 mcg total) by mouth daily (Patient not taking: Reported on 6/5/2024) 90 tablet 1    furosemide (LASIX) 20 mg tablet Take 1 tablet (20 mg total) by mouth daily 90  "tablet 1    levocetirizine (XYZAL) 5 MG tablet Take 1 tablet (5 mg total) by mouth every evening 90 tablet 0    levothyroxine (Euthyrox) 125 mcg tablet Take 1 tablet (125 mcg total) by mouth daily in the early morning 90 tablet 1    lidocaine (Lidoderm) 5 % Apply 1 patch topically over 12 hours daily Remove & Discard patch within 12 hours or as directed by MD (Patient not taking: Reported on 6/4/2024) 30 patch 0    lurasidone (LATUDA) 40 mg tablet       metFORMIN (GLUCOPHAGE-XR) 750 mg 24 hr tablet Take 1 tablet (750 mg total) by mouth daily with breakfast 90 tablet 1    naltrexone (REVIA) 50 mg tablet TAKE 1 TABLET (50 MG TOTAL) BY MOUTH DAILY 30 tablet 0    OXcarbazepine (TRILEPTAL) 150 mg tablet Take 3 tablets (450 mg total) by mouth every 12 (twelve) hours (Patient not taking: Reported on 6/4/2024) 180 tablet 1    OXcarbazepine (TRILEPTAL) 300 mg tablet Take 300 mg by mouth every 12 (twelve) hours      oxybutynin (DITROPAN) 5 mg tablet Take 1 tablet (5 mg total) by mouth 2 (two) times a day 120 tablet 1    paliperidone palmitate ER (INVEGA) 234 mg/1.5 mL IM injection 1.5 mL (234 mg total) by IM- Deltoid route every 4 weeks Do not start before February 28, 2024.      pantoprazole (PROTONIX) 40 mg tablet Take 1 tablet (40 mg total) by mouth daily in the early morning 90 tablet 1    pilocarpine (SALAGEN) 5 mg tablet Take 1 tablet (5 mg total) by mouth 3 (three) times a day 270 tablet 0    topiramate (TOPAMAX) 200 MG tablet Take 1 tablet (200 mg total) by mouth 2 (two) times a day 60 tablet 1    warfarin (Coumadin) 2 mg tablet Please take as discussed on the phone: Saturday and Sunday 7 mg once a day (Monday through Friday 5 mg once a day) (Patient not taking: Reported on 6/4/2024) 90 tablet 1    warfarin (COUMADIN) 5 mg tablet Take 1 tablet (5 mg total) by mouth daily 90 tablet 1     No current facility-administered medications on file prior to visit.        Objective     /90   Ht 5' 6\" (1.676 m)   Wt (!) " 143 kg (314 lb 12.8 oz)   BMI 50.81 kg/m²     Physical Exam  Constitutional:       Appearance: Normal appearance. She is well-developed.   HENT:      Head: Normocephalic and atraumatic.   Cardiovascular:      Rate and Rhythm: Normal rate and regular rhythm.   Pulmonary:      Effort: Pulmonary effort is normal.      Breath sounds: Normal breath sounds.   Chest:   Breasts:     Right: No inverted nipple, mass, nipple discharge, skin change or tenderness.      Left: No inverted nipple, mass, nipple discharge, skin change or tenderness.   Abdominal:      General: Bowel sounds are normal. There is no distension.      Palpations: Abdomen is soft.      Tenderness: There is no abdominal tenderness. There is no guarding or rebound.   Genitourinary:     Labia:         Right: No rash, tenderness or lesion.         Left: No rash, tenderness or lesion.       Vagina: Normal. No signs of injury. No vaginal discharge or tenderness.      Cervix: No cervical motion tenderness, discharge, friability, lesion or cervical bleeding.      Uterus: Not enlarged and not tender.       Adnexa:         Right: No mass, tenderness or fullness.          Left: No mass, tenderness or fullness.     Neurological:      Mental Status: She is alert.   Psychiatric:         Behavior: Behavior normal.         Administrative Statements       I have spent a total time of 30 minutes on 06/05/24 In caring for this patient including Impressions, Counseling / Coordination of care, Documenting in the medical record, Reviewing / ordering tests, medicine, procedures  , and Obtaining or reviewing history  .

## 2024-06-10 DIAGNOSIS — E66.01 MORBID OBESITY WITH BMI OF 45.0-49.9, ADULT (HCC): ICD-10-CM

## 2024-06-10 DIAGNOSIS — F25.0 SCHIZOAFFECTIVE DISORDER, BIPOLAR TYPE (HCC): Chronic | ICD-10-CM

## 2024-06-11 LAB
LAB AP GYN PRIMARY INTERPRETATION: NORMAL
Lab: NORMAL

## 2024-06-11 RX ORDER — CLONIDINE HYDROCHLORIDE 0.2 MG/1
0.2 TABLET ORAL EVERY 12 HOURS SCHEDULED
Qty: 180 TABLET | Refills: 1 | Status: SHIPPED | OUTPATIENT
Start: 2024-06-11 | End: 2024-12-08

## 2024-06-11 RX ORDER — NALTREXONE HYDROCHLORIDE 50 MG/1
50 TABLET, FILM COATED ORAL DAILY
Qty: 90 TABLET | Refills: 1 | Status: SHIPPED | OUTPATIENT
Start: 2024-06-11

## 2024-06-12 ENCOUNTER — HOSPITAL ENCOUNTER (OUTPATIENT)
Dept: RADIOLOGY | Facility: CLINIC | Age: 54
Discharge: HOME | End: 2024-06-12
Payer: MEDICARE

## 2024-06-12 ENCOUNTER — DOCUMENTATION (OUTPATIENT)
Dept: CASE MANAGEMENT | Facility: HOSPITAL | Age: 54
End: 2024-06-12

## 2024-06-12 VITALS
SYSTOLIC BLOOD PRESSURE: 145 MMHG | DIASTOLIC BLOOD PRESSURE: 69 MMHG | RESPIRATION RATE: 18 BRPM | HEART RATE: 86 BPM | TEMPERATURE: 97.5 F | OXYGEN SATURATION: 96 %

## 2024-06-12 DIAGNOSIS — M47.812 CERVICAL SPONDYLOSIS: ICD-10-CM

## 2024-06-12 PROCEDURE — 64491 INJ PARAVERT F JNT C/T 2 LEV: CPT | Performed by: ANESTHESIOLOGY

## 2024-06-12 PROCEDURE — 64490 INJ PARAVERT F JNT C/T 1 LEV: CPT | Performed by: ANESTHESIOLOGY

## 2024-06-12 RX ORDER — BUPIVACAINE HYDROCHLORIDE 5 MG/ML
1.5 INJECTION, SOLUTION EPIDURAL; INTRACAUDAL ONCE
Status: COMPLETED | OUTPATIENT
Start: 2024-06-12 | End: 2024-06-12

## 2024-06-12 RX ORDER — LIDOCAINE HYDROCHLORIDE 10 MG/ML
3 INJECTION, SOLUTION EPIDURAL; INFILTRATION; INTRACAUDAL; PERINEURAL ONCE
Status: COMPLETED | OUTPATIENT
Start: 2024-06-12 | End: 2024-06-12

## 2024-06-12 RX ADMIN — BUPIVACAINE HYDROCHLORIDE 1.5 ML: 5 INJECTION, SOLUTION EPIDURAL; INTRACAUDAL; PERINEURAL at 09:58

## 2024-06-12 RX ADMIN — LIDOCAINE HYDROCHLORIDE 3 ML: 10 INJECTION, SOLUTION EPIDURAL; INFILTRATION; INTRACAUDAL; PERINEURAL at 09:55

## 2024-06-12 NOTE — DISCHARGE INSTR - LAB
ACTIVITY  Please do activities that will bring on the normal pain that we are rating. For example, if vacuuming or walking increases the pain, do that. This will give the most accurate response to the diary.  You may shower, but no tub baths today, or applied heat.    CARE OF THE INJECTION SITE  This area may be numb for several hours after the injection.  Notify the [facility] if you have any of the following:  redness, drainage, swelling, or fever above 100°F.    SPECIAL INSTRUCTIONS  Please return the MBB diary to our office by mail, fax, or drop it off.    MEDICATIONS  Please do not take any break through or short acting pain medications for 8 hours after the block.  Continue to take all routine medications.  Our office may have instructed you to hold some medications.    As no general anesthesia was used in today's procedure, you should not experience any side effects related to anesthesia.     If you have a problem specifically related to your procedure, please call our office at [phone].    Problems not related to your procedure should be directed to your primary care physician.

## 2024-06-12 NOTE — BEHAVIORAL HEALTH HIGH UTILIZER
Patient Name:Blanca Mason MRN:  [id number]         : 1970     Age: 53 y.o.    Sex: female   Utilization History:  (# of ED visits & IP admits; reasons)  Pt had 18 [facility]-ED visits from 2023-2024. ED presentations were related to SI with no plan or with a plan to overdose on medications. Pt intentionally overdosed on Flexeril twice and Trazodone twice. Pt reports periodic non-compliance with her medications.       Medical presentations were related to chest pain, right knee pain, withdrawing from meth, asthma exacerbation, fecal impaction, abdominal pain, toe or leg pain, dyspnea, SOB, bilateral leg edema, acute Saddle PE, acute respiratory failure. Treatment Recommendations & Presentation:  Presentation in the ED: Pt typically presents self to ED's. ED visits were related to SI with no plan or with a plan to overdose on medications. Pt intentionally overdosed on Flexeril twice and Trazodone twice. Pt reports periodic non-compliance with her medications.       Medical presentations were related to chest pain, right knee pain, withdrawing from meth, asthma exacerbation, fecal impaction, abdominal pain, toe or leg pain, dyspnea, SOB, bilateral leg edema, acute Saddle PE, acute respiratory failure.          ED Recommendations: Following medical evaluation and treatment, allow pt time for de-escalation and to establish acute risk versus pt's chronic behavioral disturbances. Please contact pt's [facility] ACT team to develop a safe discharge plan for pt to return home at [phone]. Pt should be contacting her ACT team when in Crisis and formulating an action plan to include distress tolerance coping skills.   Pt should continue her medical treatment with her multiple medical speciality providers.       Home Medication Regimen: see most recent documentation in Epic, you can also verify pt's medications with her ACT team.        Recent Medical Work Ups:  (include Psych testing or ECT)     Comprehensive  labs - ,   CT's -   Xrays -   VAS -   ECG - ,  Inpatient Recommendations: Collaborate with pt's community resources to develop a comprehensive discharge plan.              Outpatient recommendations: Pt should continue her medical and mental health care with her community providers and take her medications as prescribed.           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     Pt lives alone in an apartment that is close to where her father resides and has the [place] [employer] team. Pt's mother  a few years ago in  and pt reports this as a difficult issue for her.   Pt has experienced various medical issues in  and appears to be very preoccupied with her medical conditions, perceived medical issues and treatment and becomes frustrated if she feels she is not obtaining the medical care she needs in a timely manner. Pt has displayed demanding and agitated behaviors while on behavioral health units.  Pt was active in going to see her father in the community and helping him as well as working part-time approx 21 hours per week. It is unknown if pt is still working. Pt appears to have poor distress tolerance and impulsivity. Pt appears to want to control her discharge date from inpatient units.  Pt has multiple medical speciality providers in addition to her PCP office: [facility], [facility], [facility],  [facility], [facility],  Physical Therapy,  [facility].                   Diagnoses/Significant Problems (Medical & Psychiatric):    Schizoaffective disorder, bipolar type, Borderline Personality Disorder  Active Problems:    Benign essential hypertension    Edema    Hypothyroidism    MAG    Overactive  bladder    Sjogren's syndrome (HCC)    COPD (chronic obstructive pulmonary disease) (HCC)    Medical clearance for psychiatric admission    History of pulmonary embolism    Ingrown toenail            Drivers of repeated utilization:  preoccupation with medical concerns and issues, personality disordered behaviors, limited to poor coping skills, impulsivity, secondary gains from ED visits and hospitalizations.                                               Existing Community Resources & Supports:                 father Luigi Mason - [phone]     Patient Medical & Psychiatric Care Team:  [facility] team - [phone]  [facility] PCP - [phone]   [facility]  [facility]  [facility]  [facility]  [facility]  [facility]    Care plan date: 06/12/24                   Author:  Dennise Napier RN                 Date reviewed with patient:

## 2024-06-12 NOTE — H&P
"History of Present Illness: The patient is a 53 y.o. female who presents with complaints of neck pain.    Past Medical History:   Diagnosis Date    Acute deep vein thrombosis of lower leg, left (HCC) 10/16/2023    Acute heart failure, unspecified heart failure type (HCC) 05/06/2024    Anxiety     Arthritis     oa cassandra knees    Asthma     good control- no medications    Yan's esophagus     Bipolar affective disorder (HCC)     Cannabis abuse with unspecified 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[facility])     Chronic pain disorder     lumbar    Contusion of elbow 12/21/2021    COPD (chronic obstructive pulmonary disease) (HCC)     CPAP (continuous positive airway pressure) dependence     DDD (degenerative disc disease), lumbar 04/09/2015    Degenerative disc disease at L5-S1 level     Deliberate self-cutting     9/15/22per pt has not done recently-- does see a therapist and psychiatrist regularly    Depression 09/16/2008    Diarrhea 01/06/2022    Disease of thyroid gland     hypo    MARTINEZ (dyspnea on exertion)     per pt \"has had this with exertion and not new\"    Drug overdose 10/28/2008    suicide attempt and again drug overdose 7/2022--[facility]-Medical floor and than transferred to [facility] Psych    Dysphagia     Dyspnea     Ecchymosis 01/06/2022    Edema     BLE    Elevated troponin 09/02/2023    Family history of blood clots     GERD (gastroesophageal reflux disease)     Grief 11/11/2023    Headache(784.0)     Headache, tension-type     Hemorrhage of rectum and anus 10/02/2017    Formatting of this note might be different from the original.  Added automatically from request for surgery [id number]    High blood pressure     History of COVID-19 12/30/2020    Symptoms started on 12/30/2020 and then got worse.  Today she is feeling a little bit better.  She is a candidate for monoclonal antibody infusion and set for 1/6/21 @ 1pm at [facility]. 01/07/21 - telemedicine -     Hyperlipidemia     Hypertension     Intentional overdose (HCC) " 09/27/2023    Intentional self-harm by unspecified sharp object, sequela (HCC) 02/09/2023    Knee pain, bilateral     Especially right    Knee pain, right 02/23/2022    Medial epicondylitis of elbow, left 04/03/2018    Formatting of this note might be different from the original.  Added automatically from request for surgery [id number]    Medial epicondylitis of right elbow 07/17/2023    Medical marijuana use     Memory difficulties 08/06/2020    Memory loss     Migraines     Obesity     Other psychoactive substance abuse with unspecified psychoactive substance-induced disorder (HCC) 05/06/2024    Overactive bladder     Polysubstance abuse ([facility]) 09/29/2023    Potential for cognitive impairment 06/17/2021    Primary osteoarthritis of both knees 08/08/2018    Pulmonary emboli ([facility])     Restrictive lung disease 02/01/2017    Last Assessment & Plan:   Formatting of this note might be different from the original.  I reviewed this problem throughout the rest of the note. Please refer to the other assessments for details.    Sjogren's disease ([facility])     Sleep apnea     Stress incontinence     Suicidal deliberate poisoning ([facility]) 07/13/2022    Suicidal ideations     Teeth missing     Toxic encephalopathy 11/08/2023    Tremor     per pt Tremors of Right Leg and both Arms    Visual impairment     Wears glasses        Past Surgical History:   Procedure Laterality Date    BREAST CYST EXCISION Right 1989    CARPAL TUNNEL RELEASE Left     CHOLECYSTECTOMY  05/2003    Laparoscopic    COLONOSCOPY      01/12/2009    DILATION AND CURETTAGE OF UTERUS      ELBOW SURGERY Left     x2. No hardware    ESOPHAGOGASTRODUODENOSCOPY      FOOT SURGERY Left     Plantar fasciotomy    HERNIA REPAIR      HYSTERECTOMY      laporoscopic, partial    KNEE ARTHROSCOPY Bilateral     OOPHORECTOMY Left 10/2015    LA GASTROCNEMIUS RECESSION Left 02/24/2021    Procedure: RECESSION GASTROC OPEN;  Surgeon: Nomi Yang DPM;  Location:  MAIN OR;  Service:  Podiatry    SC RPR UMBILICAL HRNA 5 YRS/> REDUCIBLE N/A 04/24/2019    Procedure: REPAIR HERNIA UMBILICAL LAPAROSCOPIC W/ ROBOTICS;  Surgeon: Anahi Colon MD;  Location: [facility];  Service: General    SC SPHINCTEROTOMY ANAL DIVISION SPHINCTER SPX N/A 08/31/2018    Procedure: EUA, LEFT PARTIAL INTERNAL SPHINCTEROTOMY;  Surgeon: Tien Reyes MD;  Location: [facility]IN OR;  Service: Colorectal    SC TNOT ELBOW LATERAL/MEDIAL DEBRIDE OPEN TDN RPR Left 09/20/2022    Procedure: RELEASE EPICONDYLAR ELBOW MEDIAL;  Surgeon: Kyree Winkler MD;  [facility];  Service: Orthopedics    REDUCTION MAMMAPLASTY Bilateral 1999    REPAIR LACERATION Left     left hand-5/18/2009    REPLACEMENT TOTAL KNEE Right     ROTATOR CUFF REPAIR Left     TONSILECTOMY AND ADNOIDECTOMY      US GUIDED MSK PROCEDURE  04/22/2021    VASCULAR SURGERY      VEIN LIGATION Bilateral     WISDOM TOOTH EXTRACTION           Current Outpatient Medications:     atoMOXetine (STRATTERA) 40 mg capsule, Take 1 capsule (40 mg total) by mouth daily (Patient not taking: Reported on 6/4/2024), Disp: 30 capsule, Rfl: 1    atoMOXetine (STRATTERA) 60 mg capsule, Take 60 mg by mouth daily, Disp: , Rfl:     buPROPion (FORFIVO XL) 450 MG 24 hr tablet, Take 1 tablet (450 mg total) by mouth daily (Patient not taking: Reported on 6/4/2024), Disp: 30 tablet, Rfl: 1    buPROPion (WELLBUTRIN XL) 150 mg 24 hr tablet, , Disp: , Rfl:     Cholecalciferol (Vitamin D3) 125 MCG (5000 UT) capsule, Take 1 capsule (5,000 Units total) by mouth daily, Disp: 90 capsule, Rfl: 1    cloNIDine (CATAPRES) 0.2 mg tablet, TAKE 1 TABLET (0.2 MG TOTAL) BY MOUTH EVERY 12 (TWELVE) HOURS, Disp: 180 tablet, Rfl: 1    cyanocobalamin (VITAMIN B-12) 1000 MCG tablet, Take 1 tablet (1,000 mcg total) by mouth daily (Patient not taking: Reported on 6/5/2024), Disp: 90 tablet, Rfl: 1    furosemide (LASIX) 20 mg tablet, Take 1 tablet (20 mg total) by mouth daily, Disp: 90 tablet, Rfl: 1     levocetirizine (XYZAL) 5 MG tablet, Take 1 tablet (5 mg total) by mouth every evening, Disp: 90 tablet, Rfl: 0    levothyroxine (Euthyrox) 125 mcg tablet, Take 1 tablet (125 mcg total) by mouth daily in the early morning, Disp: 90 tablet, Rfl: 1    lidocaine (Lidoderm) 5 %, Apply 1 patch topically over 12 hours daily Remove & Discard patch within 12 hours or as directed by MD (Patient not taking: Reported on 6/4/2024), Disp: 30 patch, Rfl: 0    losartan (COZAAR) 100 MG tablet, Take 1 tablet (100 mg total) by mouth daily, Disp: 90 tablet, Rfl: 1    lurasidone (LATUDA) 40 mg tablet, , Disp: , Rfl:     metFORMIN (GLUCOPHAGE-XR) 750 mg 24 hr tablet, Take 1 tablet (750 mg total) by mouth daily with breakfast, Disp: 90 tablet, Rfl: 1    naltrexone (REVIA) 50 mg tablet, TAKE 1 TABLET BY MOUTH DAILY, Disp: 90 tablet, Rfl: 1    OXcarbazepine (TRILEPTAL) 150 mg tablet, Take 3 tablets (450 mg total) by mouth every 12 (twelve) hours (Patient not taking: Reported on 6/4/2024), Disp: 180 tablet, Rfl: 1    OXcarbazepine (TRILEPTAL) 300 mg tablet, Take 300 mg by mouth every 12 (twelve) hours, Disp: , Rfl:     oxybutynin (DITROPAN) 5 mg tablet, Take 1 tablet (5 mg total) by mouth 2 (two) times a day, Disp: 120 tablet, Rfl: 1    paliperidone palmitate ER (INVEGA) 234 mg/1.5 mL IM injection, 1.5 mL (234 mg total) by IM- Deltoid route every 4 weeks Do not start before February 28, 2024., Disp: , Rfl:     pantoprazole (PROTONIX) 40 mg tablet, Take 1 tablet (40 mg total) by mouth daily in the early morning, Disp: 90 tablet, Rfl: 1    pilocarpine (SALAGEN) 5 mg tablet, Take 1 tablet (5 mg total) by mouth 3 (three) times a day, Disp: 270 tablet, Rfl: 0    topiramate (TOPAMAX) 200 MG tablet, Take 1 tablet (200 mg total) by mouth 2 (two) times a day, Disp: 60 tablet, Rfl: 1    warfarin (Coumadin) 2 mg tablet, Please take as discussed on the phone: Saturday and Sunday 7 mg once a day (Monday through Friday 5 mg once a day) (Patient not  "taking: Reported on 6/4/2024), Disp: 90 tablet, Rfl: 1    warfarin (COUMADIN) 5 mg tablet, Take 1 tablet (5 mg total) by mouth daily, Disp: 90 tablet, Rfl: 1    Allergies   Allergen Reactions    Percocet [Oxycodone-Acetaminophen] Headache     Severe headaches   (denies issues with Tylenol)    Povidone Iodine Rash     Unsure if betadine or gauze post surgical. Got rash on leg.   Has  Had itchy rashes after every surgery prep and IV insertion    Chlorhexidine Rash     Per pt \"able to use the liquid soap--thinkd reaction from the sponges or wipes\"       Physical Exam:   Vitals:    06/12/24 0939   BP: 146/97   Pulse: 71   Resp: 17   Temp: 97.5 °F (36.4 °C)   SpO2: 95%     General: Awake, Alert, Oriented x 3, Mood and affect appropriate  Respiratory: Respirations even and unlabored  Cardiovascular: Peripheral pulses intact; no edema  Musculoskeletal Exam: normal gait    ASA Score: 3    Patient/Chart Verification  Patient ID Verified: Verbal  ID Band Applied: No  Consents Confirmed: Procedural  H&P( within 30 days) Verified: To be obtained in the Pre-Procedure area  Interval H&P(within 24 hr) Complete (required for Outpatients and Surgery Admit only): To be obtained in the Pre-Procedure area  Allergies Reviewed: Yes  Anticoag/NSAID held?: NA  Currently on antibiotics?: No    Assessment:   1. Cervical spondylosis        Plan: left C4-6 medial branch block    "

## 2024-06-14 ENCOUNTER — OFFICE VISIT (OUTPATIENT)
Dept: FAMILY MEDICINE CLINIC | Facility: CLINIC | Age: 54
End: 2024-06-14
Payer: MEDICARE

## 2024-06-14 VITALS
WEIGHT: 293 LBS | HEART RATE: 81 BPM | RESPIRATION RATE: 18 BRPM | BODY MASS INDEX: 47.09 KG/M2 | SYSTOLIC BLOOD PRESSURE: 132 MMHG | OXYGEN SATURATION: 97 % | TEMPERATURE: 98 F | HEIGHT: 66 IN | DIASTOLIC BLOOD PRESSURE: 96 MMHG

## 2024-06-14 DIAGNOSIS — M25.512 ACUTE PAIN OF LEFT SHOULDER: Primary | ICD-10-CM

## 2024-06-14 PROCEDURE — 99213 OFFICE O/P EST LOW 20 MIN: CPT

## 2024-06-14 RX ORDER — NAPROXEN 500 MG/1
500 TABLET ORAL 2 TIMES DAILY WITH MEALS
Qty: 20 TABLET | Refills: 0 | Status: SHIPPED | OUTPATIENT
Start: 2024-06-14 | End: 2024-06-24

## 2024-06-14 NOTE — LETTER
June 14, 2024     Patient: Blanca Mason  YOB: 1970  Date of Visit: 6/14/2024      To Whom it May Concern:    Blanca Mason is under my professional care. Blanca was seen in my office on 6/14/2024. Please excuse Blanca on 06/14/24. Blanca may return to work on 06/18/24 .    If you have any questions or concerns, please don't hesitate to call.         Sincerely,          JHONATAN Armando  Office visit on 06/14/24 @ 1200       CC: No Recipients

## 2024-06-14 NOTE — PATIENT INSTRUCTIONS
Exercises for Internal and External Shoulder Rotation   WHAT YOU NEED TO KNOW:   Exercises for internal shoulder rotation work the muscles in your chest and front of your shoulder. Exercises for external shoulder rotation work the muscles in the back of your shoulder and upper back.  DISCHARGE INSTRUCTIONS:   Call your doctor or physical therapist if:   You have sharp or worsening pain during exercise or at rest.    You have questions or concerns about your shoulder exercises.    Before you exercise:  Warm up and stretch before you exercise. Walk or ride a stationary bike for 5 to 10 minutes to help you warm up. Stretching helps increase range of motion. It may also decrease muscle soreness and help prevent another injury. Your healthcare provider will tell you which of the following stretches to do:  Crossover arm stretch:  Relax your shoulders. Hold your upper arm with the opposite hand. Pull your arm across your chest until you feel a stretch. Hold the stretch for 30 seconds. Return to the starting position.         Shoulder flexion stretch:  Stand facing a wall. Slowly walk your fingers up the wall until you feel a stretch. Hold the stretch for 30 seconds. Return to the starting position.         Sleeper stretch:  Lie on your injured side on a firm, flat surface. Bend the elbow of your injured arm 90° with your hand facing up. Use your arm that is not injured to slowly push your injured arm down. Stop when you feel a stretch at the back of your injured shoulder. Hold the stretch for 30 seconds. Slowly return to the starting position.       How to exercise with a weight:  Your healthcare provider or physical therapist will tell you how much weight to use.  Shoulder internal rotation:  Sit in a chair. Place a rolled up towel between your elbow and your side. Bend your elbow to 90°. Gently squeeze the towel with your elbow to prevent it from falling out. Hold the weight with your thumb pointing up. Slowly move the  weight across your chest. Stop when your hand reaches your opposite arm. Hold this position for as many seconds as directed. Slowly return to the starting position.    Shoulder external rotation:  Lie on your side with your injured shoulder facing up. Bend your elbow 90°. Place a rolled up towel between your elbow and your side. Hold a weight in your hand. Gently squeeze the towel with your elbow to prevent it from falling out. Slowly rotate your arm outward, but keep your elbow bent. Stop when you feel a stretch. Hold this position for 30 seconds or as directed. Slowly return to the starting position.    How to exercise with an exercise band:  Your healthcare provider or physical therapist will tell you how much resistance to use.  Shoulder internal rotation:  Tie one end of the exercise band to a heavy, secure object. Sit in a chair. Place a rolled up towel between your elbow and your side. Bend your elbow to 90°. Gently squeeze the towel with your elbow to prevent it from falling out. Slowly pull the band across your chest. Stop when your hand reaches your opposite arm. Hold this position for as many seconds as directed. Slowly return to the starting position.    Shoulder external rotation:  Hold one end of the exercise band on the side that is not injured. Place a rolled up towel between your elbow and your side. Bend your elbow 90°. Squeeze the towel with your elbow to prevent it from falling out. Grab the end of the band and slowly turn your arm outward, but keep your elbow bent. Stop when you feel a stretch. Hold this position for 30 seconds or as directed. Slowly return to the starting position.    Follow up with your physical therapist as directed:  Write down your questions so you remember to ask them at your visits.  © Copyright [employer] 2023 Information is for End User's use only and may not be sold, redistributed or otherwise used for commercial purposes.  The above information is an   only. It is not intended as medical advice for individual conditions or treatments. Talk to your doctor, nurse or pharmacist before following any medical regimen to see if it is safe and effective for you.

## 2024-06-14 NOTE — PROGRESS NOTES
Ambulatory Visit  Name: Blanca Mason      : 1970      MRN: [id number]  Encounter Provider: JHONATAN Armando  Encounter Date: 2024   Encounter department: [facility]    Assessment & Plan   1. Acute pain of left shoulder  -     naproxen (Naprosyn) 500 mg tablet; Take 1 tablet (500 mg total) by mouth 2 (two) times a day with meals for 10 days         History of Present Illness     Arm Pain   The incident occurred more than 1 week ago. The incident occurred at home. The injury mechanism was repetitive motion. The pain is present in the left shoulder. The quality of the pain is described as aching. The pain does not radiate. The pain is moderate. The pain has been Constant since the incident. Associated symptoms include muscle weakness. Pertinent negatives include no chest pain, numbness or tingling. The symptoms are aggravated by movement, lifting and palpation. She has tried nothing for the symptoms. The treatment provided no relief.     Review of Systems   Constitutional:  Negative for activity change, chills, fatigue and fever.   HENT:  Negative for congestion, ear pain, rhinorrhea, sore throat and trouble swallowing.    Eyes:  Negative for pain and visual disturbance.   Respiratory:  Negative for cough, chest tightness and shortness of breath.    Cardiovascular:  Negative for chest pain, palpitations and leg swelling.   Gastrointestinal:  Negative for abdominal pain, constipation, diarrhea, nausea and vomiting.   Genitourinary:  Negative for difficulty urinating, dysuria, hematuria and urgency.   Musculoskeletal:  Positive for arthralgias. Negative for back pain.   Skin:  Negative for color change and rash.   Neurological:  Negative for dizziness, tingling, seizures, syncope, numbness and headaches.   Psychiatric/Behavioral:  Negative for dysphoric mood. The patient is not nervous/anxious.    All other systems reviewed and are negative.    Past Medical History:  "  Diagnosis Date    Acute deep vein thrombosis of lower leg, left (formerly [facility]) 10/16/2023    Acute heart failure, unspecified heart failure type (formerly [facility]) 05/06/2024    Anxiety     Arthritis     oa cassandra knees    Asthma     good control- no medications    Yan's esophagus     Bipolar affective disorder (formerly [facility])     Cannabis abuse with unspecified 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formerly [facility])     Chronic pain disorder     lumbar    Contusion of elbow 12/21/2021    COPD (chronic obstructive pulmonary disease) (formerly [facility])     CPAP (continuous positive airway pressure) dependence     DDD (degenerative disc disease), lumbar 04/09/2015    Degenerative disc disease at L5-S1 level     Deliberate self-cutting     9/15/22per pt has not done recently-- does see a therapist and psychiatrist regularly    Depression 09/16/2008    Diarrhea 01/06/2022    Disease of thyroid gland     hypo    MARTINEZ (dyspnea on exertion)     per pt \"has had this with exertion and not new\"    Drug overdose 10/28/2008    suicide attempt and again drug overdose 7/2022--[facility]-Medical floor and than transferred to [facility]    Dysphagia     Dyspnea     Ecchymosis 01/06/2022    Edema     BLE    Elevated troponin 09/02/2023    Family history of blood clots     GERD (gastroesophageal reflux disease)     Grief 11/11/2023    Headache(784.0)     Headache, tension-type     Hemorrhage of rectum and anus 10/02/2017    Formatting of this note might be different from the original.  Added automatically from request for surgery [id number]    High blood pressure     History of COVID-19 12/30/2020    Symptoms started on 12/30/2020 and then got worse.  Today she is feeling a little bit better.  She is a candidate for monoclonal antibody infusion and set for 1/6/21 @ 1pm at [facility]. 01/07/21 - telemedicine -     Hyperlipidemia     Hypertension     Intentional overdose (formerly [facility]) 09/27/2023    Intentional self-harm by unspecified sharp object, sequela (formerly [facility]) 02/09/2023    Knee pain, bilateral     Especially " right    Knee pain, right 02/23/2022    Medial epicondylitis of elbow, left 04/03/2018    Formatting of this note might be different from the original.  Added automatically from request for surgery [id number]    Medial epicondylitis of right elbow 07/17/2023    Medical marijuana use     Memory difficulties 08/06/2020    Memory loss     Migraines     Obesity     Other psychoactive substance abuse with unspecified psychoactive substance-induced disorder (HCC) 05/06/2024    Overactive bladder     Polysubstance abuse (HCC) 09/29/2023    Potential for cognitive impairment 06/17/2021    Primary osteoarthritis of both knees 08/08/2018    Pulmonary emboli (HCC)     Restrictive lung disease 02/01/2017    Last Assessment & Plan:   Formatting of this note might be different from the original.  I reviewed this problem throughout the rest of the note. Please refer to the other assessments for details.    Sjogren's disease (HCC)     Sleep apnea     Stress incontinence     Suicidal deliberate poisoning (HCC) 07/13/2022    Suicidal ideations     Teeth missing     Toxic encephalopathy 11/08/2023    Tremor     per pt Tremors of Right Leg and both Arms    Visual impairment     Wears glasses      Past Surgical History:   Procedure Laterality Date    BREAST CYST EXCISION Right 1989    CARPAL TUNNEL RELEASE Left     CHOLECYSTECTOMY  05/2003    Laparoscopic    COLONOSCOPY      01/12/2009    DILATION AND CURETTAGE OF UTERUS      ELBOW SURGERY Left     x2. No hardware    ESOPHAGOGASTRODUODENOSCOPY      FOOT SURGERY Left     Plantar fasciotomy    HERNIA REPAIR      HYSTERECTOMY      laporoscopic, partial    KNEE ARTHROSCOPY Bilateral     OOPHORECTOMY Left 10/2015    MT GASTROCNEMIUS RECESSION Left 02/24/2021    Procedure: RECESSION GASTROC OPEN;  Surgeon: Nomi Yang DPM;  Location:  MAIN OR;  Service: Podiatry    MT RPR UMBILICAL HRNA 5 YRS/> REDUCIBLE N/A 04/24/2019    Procedure: REPAIR HERNIA UMBILICAL LAPAROSCOPIC W/ ROBOTICS;   Surgeon: Anahi Colon MD;  Location: [facility] OR;  Service: General    VT SPHINCTEROTOMY ANAL DIVISION SPHINCTER SPX N/A 2018    Procedure: EUA, LEFT PARTIAL INTERNAL SPHINCTEROTOMY;  Surgeon: Tien Reyes MD;  Location:  MAIN OR;  Service: Colorectal    VT TNOT ELBOW LATERAL/MEDIAL DEBRIDE OPEN TDN RPR Left 2022    Procedure: RELEASE EPICONDYLAR ELBOW MEDIAL;  Surgeon: Kyree Winkler MD;  [facility];  Service: Orthopedics    REDUCTION MAMMAPLASTY Bilateral 1999    REPAIR LACERATION Left     left hand-2009    REPLACEMENT TOTAL KNEE Right     ROTATOR CUFF REPAIR Left     TONSILECTOMY AND ADNOIDECTOMY      US GUIDED MSK PROCEDURE  2021    VASCULAR SURGERY      VEIN LIGATION Bilateral     WISDOM TOOTH EXTRACTION       Family History   Problem Relation Age of Onset    Kidney cancer Mother     Diabetes Mother     Depression Mother     Stroke Mother     Arthritis Mother     Cancer Mother     Psychiatric Illness Mother     No Known Problems Father     No Known Problems Sister     No Known Problems Maternal Grandmother     No Known Problems Maternal Grandfather     No Known Problems Paternal Grandmother     No Known Problems Paternal Grandfather     Colon cancer Maternal Uncle     Colon cancer Maternal Uncle     Colon cancer Paternal Uncle     Colon cancer Family     Alcohol abuse Sister     Asthma Sister      Social History     Tobacco Use    Smoking status: Former     Current packs/day: 0.00     Average packs/day: 2.0 packs/day for 33.0 years (66.0 ttl pk-yrs)     Types: Cigarettes     Start date: 1985     Quit date: 2018     Years since quittin.4    Smokeless tobacco: Never    Tobacco comments:     Started at age 16.   Vaping Use    Vaping status: Never Used   Substance and Sexual Activity    Alcohol use: Not Currently     Comment: Recovering alcoholic    Drug use: Yes     Types: Marijuana, Methamphetamines     Comment: 2 months off meth    Sexual activity: Not  Currently     Partners: Male     Comment: defer     Current Outpatient Medications on File Prior to Visit   Medication Sig    atoMOXetine (STRATTERA) 60 mg capsule Take 60 mg by mouth daily    buPROPion (WELLBUTRIN XL) 150 mg 24 hr tablet     Cholecalciferol (Vitamin D3) 125 MCG (5000 UT) capsule Take 1 capsule (5,000 Units total) by mouth daily    cloNIDine (CATAPRES) 0.2 mg tablet TAKE 1 TABLET (0.2 MG TOTAL) BY MOUTH EVERY 12 (TWELVE) HOURS    furosemide (LASIX) 20 mg tablet Take 1 tablet (20 mg total) by mouth daily    levocetirizine (XYZAL) 5 MG tablet Take 1 tablet (5 mg total) by mouth every evening    levothyroxine (Euthyrox) 125 mcg tablet Take 1 tablet (125 mcg total) by mouth daily in the early morning    losartan (COZAAR) 100 MG tablet Take 1 tablet (100 mg total) by mouth daily    lurasidone (LATUDA) 40 mg tablet     metFORMIN (GLUCOPHAGE-XR) 750 mg 24 hr tablet Take 1 tablet (750 mg total) by mouth daily with breakfast    naltrexone (REVIA) 50 mg tablet TAKE 1 TABLET BY MOUTH DAILY    OXcarbazepine (TRILEPTAL) 300 mg tablet Take 300 mg by mouth every 12 (twelve) hours    oxybutynin (DITROPAN) 5 mg tablet Take 1 tablet (5 mg total) by mouth 2 (two) times a day    paliperidone palmitate ER (INVEGA) 234 mg/1.5 mL IM injection 1.5 mL (234 mg total) by IM- Deltoid route every 4 weeks Do not start before February 28, 2024.    pantoprazole (PROTONIX) 40 mg tablet Take 1 tablet (40 mg total) by mouth daily in the early morning    pilocarpine (SALAGEN) 5 mg tablet Take 1 tablet (5 mg total) by mouth 3 (three) times a day    warfarin (COUMADIN) 5 mg tablet Take 1 tablet (5 mg total) by mouth daily    atoMOXetine (STRATTERA) 40 mg capsule Take 1 capsule (40 mg total) by mouth daily (Patient not taking: Reported on 6/4/2024)    buPROPion (FORFIVO XL) 450 MG 24 hr tablet Take 1 tablet (450 mg total) by mouth daily (Patient not taking: Reported on 6/4/2024)    cyanocobalamin (VITAMIN B-12) 1000 MCG tablet Take 1  "tablet (1,000 mcg total) by mouth daily (Patient not taking: Reported on 6/5/2024)    lidocaine (Lidoderm) 5 % Apply 1 patch topically over 12 hours daily Remove & Discard patch within 12 hours or as directed by MD (Patient not taking: Reported on 6/4/2024)    OXcarbazepine (TRILEPTAL) 150 mg tablet Take 3 tablets (450 mg total) by mouth every 12 (twelve) hours (Patient not taking: Reported on 6/4/2024)    topiramate (TOPAMAX) 200 MG tablet Take 1 tablet (200 mg total) by mouth 2 (two) times a day    warfarin (Coumadin) 2 mg tablet Please take as discussed on the phone: Saturday and Sunday 7 mg once a day (Monday through Friday 5 mg once a day) (Patient not taking: Reported on 6/4/2024)     Allergies   Allergen Reactions    Percocet [Oxycodone-Acetaminophen] Headache     Severe headaches   (denies issues with Tylenol)    Povidone Iodine Rash     Unsure if betadine or gauze post surgical. Got rash on leg.   Has  Had itchy rashes after every surgery prep and IV insertion    Chlorhexidine Rash     Per pt \"able to use the liquid soap--thinkd reaction from the sponges or wipes\"     Immunization History   Administered Date(s) Administered    COVID-19 PFIZER VACCINE 0.3 ML IM 03/31/2021, 04/21/2021, 12/13/2021, 05/09/2022    COVID-19 Pfizer vac (Edwar-sucrose, gray cap) 12 yr+ IM 05/09/2022    INFLUENZA 08/07/2014, 07/29/2015, 10/10/2018, 11/10/2022, 10/18/2023    Influenza Split 07/29/2015    Influenza, injectable, quadrivalent, preservative free 0.5 mL 10/18/2023    Influenza, recombinant, quadrivalent,injectable, preservative free 11/27/2018, 09/20/2021, 11/10/2022    Tdap 01/26/2009, 07/23/2018    Tuberculin Skin Test-PPD Intradermal 12/10/2018, 01/26/2022     Objective     /96 (BP Location: Left arm, Patient Position: Sitting, Cuff Size: Large)   Pulse 81   Temp 98 °F (36.7 °C) (Temporal)   Resp 18   Ht 5' 6\" (1.676 m)   Wt (!) 142 kg (314 lb)   SpO2 97%   BMI 50.68 kg/m²     Physical Exam  Vitals and " nursing note reviewed.   Constitutional:       General: She is not in acute distress.     Appearance: Normal appearance. She is well-developed and normal weight.   HENT:      Head: Normocephalic and atraumatic.      Right Ear: Tympanic membrane, ear canal and external ear normal.      Left Ear: Tympanic membrane, ear canal and external ear normal.      Nose: Nose normal.      Mouth/Throat:      Mouth: Mucous membranes are moist.      Pharynx: Oropharynx is clear.   Eyes:      Extraocular Movements: Extraocular movements intact.      Conjunctiva/sclera: Conjunctivae normal.      Pupils: Pupils are equal, round, and reactive to light.   Cardiovascular:      Rate and Rhythm: Normal rate and regular rhythm.      Pulses: Normal pulses.      Heart sounds: Normal heart sounds. No murmur heard.  Pulmonary:      Effort: Pulmonary effort is normal. No respiratory distress.      Breath sounds: Normal breath sounds.   Abdominal:      General: Bowel sounds are normal.      Palpations: Abdomen is soft.      Tenderness: There is no abdominal tenderness.   Musculoskeletal:         General: No swelling.      Left shoulder: Tenderness present. Decreased range of motion.      Left upper arm: Tenderness present.      Cervical back: Normal range of motion and neck supple.   Skin:     General: Skin is warm and dry.      Capillary Refill: Capillary refill takes less than 2 seconds.   Neurological:      General: No focal deficit present.      Mental Status: She is alert and oriented to person, place, and time. Mental status is at baseline.   Psychiatric:         Mood and Affect: Mood normal.         Behavior: Behavior normal.         Thought Content: Thought content normal.         Judgment: Judgment normal.       Administrative Statements     Patient Instructions   Exercises for Internal and External Shoulder Rotation   WHAT YOU NEED TO KNOW:   Exercises for internal shoulder rotation work the muscles in your chest and front of your  shoulder. Exercises for external shoulder rotation work the muscles in the back of your shoulder and upper back.  DISCHARGE INSTRUCTIONS:   Call your doctor or physical therapist if:   You have sharp or worsening pain during exercise or at rest.    You have questions or concerns about your shoulder exercises.    Before you exercise:  Warm up and stretch before you exercise. Walk or ride a stationary bike for 5 to 10 minutes to help you warm up. Stretching helps increase range of motion. It may also decrease muscle soreness and help prevent another injury. Your healthcare provider will tell you which of the following stretches to do:  Crossover arm stretch:  Relax your shoulders. Hold your upper arm with the opposite hand. Pull your arm across your chest until you feel a stretch. Hold the stretch for 30 seconds. Return to the starting position.         Shoulder flexion stretch:  Stand facing a wall. Slowly walk your fingers up the wall until you feel a stretch. Hold the stretch for 30 seconds. Return to the starting position.         Sleeper stretch:  Lie on your injured side on a firm, flat surface. Bend the elbow of your injured arm 90° with your hand facing up. Use your arm that is not injured to slowly push your injured arm down. Stop when you feel a stretch at the back of your injured shoulder. Hold the stretch for 30 seconds. Slowly return to the starting position.       How to exercise with a weight:  Your healthcare provider or physical therapist will tell you how much weight to use.  Shoulder internal rotation:  Sit in a chair. Place a rolled up towel between your elbow and your side. Bend your elbow to 90°. Gently squeeze the towel with your elbow to prevent it from falling out. Hold the weight with your thumb pointing up. Slowly move the weight across your chest. Stop when your hand reaches your opposite arm. Hold this position for as many seconds as directed. Slowly return to the starting  position.    Shoulder external rotation:  Lie on your side with your injured shoulder facing up. Bend your elbow 90°. Place a rolled up towel between your elbow and your side. Hold a weight in your hand. Gently squeeze the towel with your elbow to prevent it from falling out. Slowly rotate your arm outward, but keep your elbow bent. Stop when you feel a stretch. Hold this position for 30 seconds or as directed. Slowly return to the starting position.    How to exercise with an exercise band:  Your healthcare provider or physical therapist will tell you how much resistance to use.  Shoulder internal rotation:  Tie one end of the exercise band to a heavy, secure object. Sit in a chair. Place a rolled up towel between your elbow and your side. Bend your elbow to 90°. Gently squeeze the towel with your elbow to prevent it from falling out. Slowly pull the band across your chest. Stop when your hand reaches your opposite arm. Hold this position for as many seconds as directed. Slowly return to the starting position.    Shoulder external rotation:  Hold one end of the exercise band on the side that is not injured. Place a rolled up towel between your elbow and your side. Bend your elbow 90°. Squeeze the towel with your elbow to prevent it from falling out. Grab the end of the band and slowly turn your arm outward, but keep your elbow bent. Stop when you feel a stretch. Hold this position for 30 seconds or as directed. Slowly return to the starting position.    Follow up with your physical therapist as directed:  Write down your questions so you remember to ask them at your visits.  © Copyright [employer] 2023 Information is for End User's use only and may not be sold, redistributed or otherwise used for commercial purposes.  The above information is an  only. It is not intended as medical advice for individual conditions or treatments. Talk to your doctor, nurse or pharmacist before following any medical  regimen to see if it is safe and effective for you.

## 2024-06-17 ENCOUNTER — TELEPHONE (OUTPATIENT)
Dept: RADIOLOGY | Facility: CLINIC | Age: 54
End: 2024-06-17

## 2024-06-17 DIAGNOSIS — M47.812 CERVICAL SPONDYLOSIS: Primary | ICD-10-CM

## 2024-06-17 NOTE — TELEPHONE ENCOUNTER
Caller: Blanca     Doctor: virgil     Reason for call: pt returning the nurse call to schedule    Call back#: [phone]

## 2024-06-18 NOTE — TELEPHONE ENCOUNTER
Caller: corey     Doctor: virgil     Reason for call: xfer call to nurse to schedule the pt     Call back#: [phone]

## 2024-06-18 NOTE — TELEPHONE ENCOUNTER
Spoke with patient scheduled procedure. Reviewed all instructions by phone upon scheduling  Mailed copy to home

## 2024-06-19 ENCOUNTER — TRANSCRIBE ORDERS (OUTPATIENT)
Dept: LAB | Facility: CLINIC | Age: 54
End: 2024-06-19

## 2024-06-19 ENCOUNTER — APPOINTMENT (OUTPATIENT)
Dept: LAB | Facility: CLINIC | Age: 54
End: 2024-06-19
Payer: MEDICARE

## 2024-06-19 DIAGNOSIS — Z79.01 ANTICOAGULATION MANAGEMENT ENCOUNTER: ICD-10-CM

## 2024-06-19 DIAGNOSIS — R35.0 URINARY FREQUENCY: Primary | ICD-10-CM

## 2024-06-19 DIAGNOSIS — Z86.718 HISTORY OF DVT (DEEP VEIN THROMBOSIS): ICD-10-CM

## 2024-06-19 DIAGNOSIS — R32 URINARY INCONTINENCE, UNSPECIFIED TYPE: ICD-10-CM

## 2024-06-19 DIAGNOSIS — Z86.711 HISTORY OF PULMONARY EMBOLISM: ICD-10-CM

## 2024-06-19 DIAGNOSIS — R35.0 URINARY FREQUENCY: ICD-10-CM

## 2024-06-19 DIAGNOSIS — Z51.81 ANTICOAGULATION MANAGEMENT ENCOUNTER: ICD-10-CM

## 2024-06-19 LAB
BACTERIA UR QL AUTO: ABNORMAL /HPF
BILIRUB UR QL STRIP: NEGATIVE
CLARITY UR: CLEAR
COLOR UR: YELLOW
GLUCOSE UR STRIP-MCNC: NEGATIVE MG/DL
HGB UR QL STRIP.AUTO: NEGATIVE
INR PPP: 3.03 (ref 0.84–1.19)
KETONES UR STRIP-MCNC: NEGATIVE MG/DL
LEUKOCYTE ESTERASE UR QL STRIP: ABNORMAL
MUCOUS THREADS UR QL AUTO: ABNORMAL
NITRITE UR QL STRIP: NEGATIVE
NON-SQ EPI CELLS URNS QL MICRO: ABNORMAL /HPF
PH UR STRIP.AUTO: 5.5 [PH]
PROT UR STRIP-MCNC: ABNORMAL MG/DL
PROTHROMBIN TIME: 30.8 SECONDS (ref 11.6–14.5)
RBC #/AREA URNS AUTO: ABNORMAL /HPF
SP GR UR STRIP.AUTO: 1.03 (ref 1–1.03)
UROBILINOGEN UR STRIP-ACNC: <2 MG/DL
WBC #/AREA URNS AUTO: ABNORMAL /HPF

## 2024-06-19 PROCEDURE — 85610 PROTHROMBIN TIME: CPT

## 2024-06-19 PROCEDURE — 87186 SC STD MICRODIL/AGAR DIL: CPT

## 2024-06-19 PROCEDURE — 87077 CULTURE AEROBIC IDENTIFY: CPT

## 2024-06-19 PROCEDURE — 81001 URINALYSIS AUTO W/SCOPE: CPT

## 2024-06-19 PROCEDURE — 87086 URINE CULTURE/COLONY COUNT: CPT

## 2024-06-19 PROCEDURE — 36415 COLL VENOUS BLD VENIPUNCTURE: CPT

## 2024-06-20 ENCOUNTER — TELEPHONE (OUTPATIENT)
Dept: HEMATOLOGY ONCOLOGY | Facility: CLINIC | Age: 54
End: 2024-06-20

## 2024-06-20 NOTE — TELEPHONE ENCOUNTER
Spoke with Blanca, informed her that her INR is slightly elevated, to hold the coumadin today and tomorrow.  She can restart on the weekend at 7 mg (Saturday and SUnday), Coumadin 5 mg M-F.  We will recheck her labs next week.  Patient voiced understanding.

## 2024-06-21 LAB — BACTERIA UR CULT: ABNORMAL

## 2024-06-27 ENCOUNTER — APPOINTMENT (OUTPATIENT)
Dept: LAB | Facility: CLINIC | Age: 54
End: 2024-06-27
Payer: MEDICARE

## 2024-06-27 ENCOUNTER — TELEPHONE (OUTPATIENT)
Dept: HEMATOLOGY ONCOLOGY | Facility: CLINIC | Age: 54
End: 2024-06-27

## 2024-06-27 DIAGNOSIS — Z86.711 HISTORY OF PULMONARY EMBOLISM: ICD-10-CM

## 2024-06-27 DIAGNOSIS — Z51.81 ANTICOAGULATION MANAGEMENT ENCOUNTER: ICD-10-CM

## 2024-06-27 DIAGNOSIS — Z86.718 HISTORY OF DVT (DEEP VEIN THROMBOSIS): ICD-10-CM

## 2024-06-27 DIAGNOSIS — Z79.01 ANTICOAGULATION MANAGEMENT ENCOUNTER: ICD-10-CM

## 2024-06-27 LAB
INR PPP: 1.74 (ref 0.84–1.19)
PROTHROMBIN TIME: 20 SECONDS (ref 11.6–14.5)

## 2024-06-27 PROCEDURE — 36415 COLL VENOUS BLD VENIPUNCTURE: CPT

## 2024-06-27 PROCEDURE — 85610 PROTHROMBIN TIME: CPT

## 2024-06-27 NOTE — TELEPHONE ENCOUNTER
Left a message for Blanca informing her that her INR is 1.74.  Recommend she take Coumadin 7 mg today, Friday, Saturday, Sunday.  She will go back to 5 mg on Monday and continue what we have been doing which is Coumadin 5 mg Monday through Friday and Coumadin 7 mg Saturday and Sunday.  She will repeat the blood work next week again and I will call her with the results and instructions.  Asked her to give me a call back and let me know that she has received this message and understands the instructions.  Direct call center number provided.

## 2024-07-03 ENCOUNTER — APPOINTMENT (OUTPATIENT)
Dept: LAB | Facility: CLINIC | Age: 54
End: 2024-07-03
Payer: MEDICARE

## 2024-07-03 DIAGNOSIS — Z86.711 HISTORY OF PULMONARY EMBOLISM: ICD-10-CM

## 2024-07-03 DIAGNOSIS — Z86.718 HISTORY OF DVT (DEEP VEIN THROMBOSIS): ICD-10-CM

## 2024-07-03 DIAGNOSIS — Z51.81 ANTICOAGULATION MANAGEMENT ENCOUNTER: ICD-10-CM

## 2024-07-03 DIAGNOSIS — Z79.01 ANTICOAGULATION MANAGEMENT ENCOUNTER: ICD-10-CM

## 2024-07-03 LAB
INR PPP: 2.89 (ref 0.84–1.19)
PROTHROMBIN TIME: 29.6 SECONDS (ref 11.6–14.5)

## 2024-07-03 PROCEDURE — 85610 PROTHROMBIN TIME: CPT

## 2024-07-03 PROCEDURE — 36415 COLL VENOUS BLD VENIPUNCTURE: CPT

## 2024-07-05 ENCOUNTER — TELEPHONE (OUTPATIENT)
Dept: HEMATOLOGY ONCOLOGY | Facility: CLINIC | Age: 54
End: 2024-07-05

## 2024-07-05 NOTE — TELEPHONE ENCOUNTER
Lvm to pt informing that f/u appt is scheduled for 7/10 at 10:20 a.m with Heather in the [place] office. Provided hopeline phone number in case appt date does not work.

## 2024-07-05 NOTE — TELEPHONE ENCOUNTER
Bea, can you see if you can schedule an appt for this patient with me in the next week or two, prior to August please?  Please let her know when it is.  Thank you!

## 2024-07-10 ENCOUNTER — APPOINTMENT (EMERGENCY)
Dept: LAB | Facility: CLINIC | Age: 54
End: 2024-07-10
Payer: MEDICARE

## 2024-07-10 ENCOUNTER — TRANSCRIBE ORDERS (OUTPATIENT)
Dept: LAB | Facility: CLINIC | Age: 54
End: 2024-07-10

## 2024-07-10 ENCOUNTER — APPOINTMENT (EMERGENCY)
Dept: RADIOLOGY | Facility: HOSPITAL | Age: 54
End: 2024-07-10
Payer: MEDICARE

## 2024-07-10 ENCOUNTER — OFFICE VISIT (OUTPATIENT)
Dept: HEMATOLOGY ONCOLOGY | Facility: CLINIC | Age: 54
End: 2024-07-10
Payer: MEDICARE

## 2024-07-10 ENCOUNTER — HOSPITAL ENCOUNTER (EMERGENCY)
Facility: HOSPITAL | Age: 54
Discharge: HOME | End: 2024-07-10
Attending: EMERGENCY MEDICINE
Payer: MEDICARE

## 2024-07-10 VITALS
WEIGHT: 293 LBS | DIASTOLIC BLOOD PRESSURE: 80 MMHG | RESPIRATION RATE: 17 BRPM | HEIGHT: 66 IN | TEMPERATURE: 97 F | OXYGEN SATURATION: 94 % | HEART RATE: 99 BPM | SYSTOLIC BLOOD PRESSURE: 130 MMHG | BODY MASS INDEX: 47.09 KG/M2

## 2024-07-10 VITALS
RESPIRATION RATE: 16 BRPM | SYSTOLIC BLOOD PRESSURE: 150 MMHG | TEMPERATURE: 98.2 F | HEART RATE: 99 BPM | OXYGEN SATURATION: 96 % | DIASTOLIC BLOOD PRESSURE: 77 MMHG

## 2024-07-10 DIAGNOSIS — Z79.01 ANTICOAGULATION MANAGEMENT ENCOUNTER: Primary | ICD-10-CM

## 2024-07-10 DIAGNOSIS — I10 ESSENTIAL HYPERTENSION, MALIGNANT: ICD-10-CM

## 2024-07-10 DIAGNOSIS — J45.909 ASTHMATIC BRONCHITIS WITHOUT COMPLICATION, UNSPECIFIED ASTHMA SEVERITY, UNSPECIFIED WHETHER PERSISTENT: ICD-10-CM

## 2024-07-10 DIAGNOSIS — I51.9 MYXEDEMA HEART DISEASE: ICD-10-CM

## 2024-07-10 DIAGNOSIS — G47.00 INSOMNIA WITH SLEEP APNEA: ICD-10-CM

## 2024-07-10 DIAGNOSIS — Z79.899 ENCOUNTER FOR LONG-TERM (CURRENT) USE OF OTHER MEDICATIONS: ICD-10-CM

## 2024-07-10 DIAGNOSIS — Z86.711 HISTORY OF PULMONARY EMBOLISM: ICD-10-CM

## 2024-07-10 DIAGNOSIS — K21.9 GASTROESOPHAGEAL REFLUX DISEASE WITHOUT ESOPHAGITIS: ICD-10-CM

## 2024-07-10 DIAGNOSIS — F41.9 ANXIETY: ICD-10-CM

## 2024-07-10 DIAGNOSIS — E03.9 MYXEDEMA HEART DISEASE: ICD-10-CM

## 2024-07-10 DIAGNOSIS — F60.3 EXPLOSIVE PERSONALITY DISORDER (HCC): ICD-10-CM

## 2024-07-10 DIAGNOSIS — F31.81 BIPOLAR II DISORDER (HCC): Primary | ICD-10-CM

## 2024-07-10 DIAGNOSIS — Z79.01 ANTICOAGULATION MANAGEMENT ENCOUNTER: ICD-10-CM

## 2024-07-10 DIAGNOSIS — Z86.718 HISTORY OF DVT (DEEP VEIN THROMBOSIS): ICD-10-CM

## 2024-07-10 DIAGNOSIS — Z51.81 ANTICOAGULATION MANAGEMENT ENCOUNTER: Primary | ICD-10-CM

## 2024-07-10 DIAGNOSIS — M25.512 ACUTE PAIN OF LEFT SHOULDER: Primary | ICD-10-CM

## 2024-07-10 DIAGNOSIS — G47.30 INSOMNIA WITH SLEEP APNEA: ICD-10-CM

## 2024-07-10 DIAGNOSIS — Z51.81 ANTICOAGULATION MANAGEMENT ENCOUNTER: ICD-10-CM

## 2024-07-10 DIAGNOSIS — F42.2 MIXED OBSESSIONAL THOUGHTS AND ACTS: ICD-10-CM

## 2024-07-10 DIAGNOSIS — F31.81 BIPOLAR II DISORDER (HCC): ICD-10-CM

## 2024-07-10 DIAGNOSIS — F32.0 MILD MAJOR DEPRESSION (HCC): ICD-10-CM

## 2024-07-10 LAB
ALBUMIN SERPL BCG-MCNC: 3.9 G/DL (ref 3.5–5)
ALP SERPL-CCNC: 78 U/L (ref 34–104)
ALT SERPL W P-5'-P-CCNC: 21 U/L (ref 7–52)
ANION GAP SERPL CALCULATED.3IONS-SCNC: 19 MMOL/L (ref 4–13)
AST SERPL W P-5'-P-CCNC: 18 U/L (ref 13–39)
BASOPHILS # BLD AUTO: 0.05 THOUSANDS/ÂΜL (ref 0–0.1)
BASOPHILS NFR BLD AUTO: 1 % (ref 0–1)
BILIRUB SERPL-MCNC: 0.31 MG/DL (ref 0.2–1)
BUN SERPL-MCNC: 15 MG/DL (ref 5–25)
CALCIUM SERPL-MCNC: 9.7 MG/DL (ref 8.4–10.2)
CHLORIDE SERPL-SCNC: 104 MMOL/L (ref 96–108)
CHOLEST SERPL-MCNC: 286 MG/DL
CO2 SERPL-SCNC: 21 MMOL/L (ref 21–32)
CREAT SERPL-MCNC: 0.68 MG/DL (ref 0.6–1.3)
EOSINOPHIL # BLD AUTO: 0.14 THOUSAND/ÂΜL (ref 0–0.61)
EOSINOPHIL NFR BLD AUTO: 1 % (ref 0–6)
ERYTHROCYTE [DISTWIDTH] IN BLOOD BY AUTOMATED COUNT: 14.9 % (ref 11.6–15.1)
GFR SERPL CREATININE-BSD FRML MDRD: 100 ML/MIN/1.73SQ M
GLUCOSE SERPL-MCNC: 84 MG/DL (ref 65–140)
HCT VFR BLD AUTO: 44.4 % (ref 34.8–46.1)
HDLC SERPL-MCNC: 71 MG/DL
HGB BLD-MCNC: 14.4 G/DL (ref 11.5–15.4)
IMM GRANULOCYTES # BLD AUTO: 0.07 THOUSAND/UL (ref 0–0.2)
IMM GRANULOCYTES NFR BLD AUTO: 1 % (ref 0–2)
INR PPP: 2.88 (ref 0.84–1.19)
LDLC SERPL CALC-MCNC: 169 MG/DL (ref 0–100)
LYMPHOCYTES # BLD AUTO: 2.09 THOUSANDS/ÂΜL (ref 0.6–4.47)
LYMPHOCYTES NFR BLD AUTO: 20 % (ref 14–44)
MCH RBC QN AUTO: 29.5 PG (ref 26.8–34.3)
MCHC RBC AUTO-ENTMCNC: 32.4 G/DL (ref 31.4–37.4)
MCV RBC AUTO: 91 FL (ref 82–98)
MONOCYTES # BLD AUTO: 1.1 THOUSAND/ÂΜL (ref 0.17–1.22)
MONOCYTES NFR BLD AUTO: 10 % (ref 4–12)
NEUTROPHILS # BLD AUTO: 7.28 THOUSANDS/ÂΜL (ref 1.85–7.62)
NEUTS SEG NFR BLD AUTO: 67 % (ref 43–75)
NONHDLC SERPL-MCNC: 215 MG/DL
NRBC BLD AUTO-RTO: 0 /100 WBCS
PLATELET BLD QL SMEAR: ABNORMAL
PMV BLD AUTO: 11.1 FL (ref 8.9–12.7)
POTASSIUM SERPL-SCNC: 3.9 MMOL/L (ref 3.5–5.3)
PROT SERPL-MCNC: 7.4 G/DL (ref 6.4–8.4)
PROTHROMBIN TIME: 29.6 SECONDS (ref 11.6–14.5)
RBC # BLD AUTO: 4.88 MILLION/UL (ref 3.81–5.12)
RBC MORPH BLD: NORMAL
SODIUM SERPL-SCNC: 144 MMOL/L (ref 135–147)
T4 FREE SERPL-MCNC: 0.85 NG/DL (ref 0.61–1.12)
TRIGL SERPL-MCNC: 230 MG/DL
TSH SERPL DL<=0.05 MIU/L-ACNC: 2.38 UIU/ML (ref 0.45–4.5)
WBC # BLD AUTO: 10.73 THOUSAND/UL (ref 4.31–10.16)

## 2024-07-10 PROCEDURE — 85610 PROTHROMBIN TIME: CPT

## 2024-07-10 PROCEDURE — 84443 ASSAY THYROID STIM HORMONE: CPT

## 2024-07-10 PROCEDURE — 96372 THER/PROPH/DIAG INJ SC/IM: CPT

## 2024-07-10 PROCEDURE — 93005 ELECTROCARDIOGRAM TRACING: CPT

## 2024-07-10 PROCEDURE — 99284 EMERGENCY DEPT VISIT MOD MDM: CPT | Performed by: EMERGENCY MEDICINE

## 2024-07-10 PROCEDURE — 36415 COLL VENOUS BLD VENIPUNCTURE: CPT

## 2024-07-10 PROCEDURE — 99213 OFFICE O/P EST LOW 20 MIN: CPT

## 2024-07-10 PROCEDURE — 99284 EMERGENCY DEPT VISIT MOD MDM: CPT

## 2024-07-10 PROCEDURE — 84439 ASSAY OF FREE THYROXINE: CPT

## 2024-07-10 PROCEDURE — 80053 COMPREHEN METABOLIC PANEL: CPT

## 2024-07-10 PROCEDURE — 73030 X-RAY EXAM OF SHOULDER: CPT

## 2024-07-10 PROCEDURE — 80061 LIPID PANEL: CPT

## 2024-07-10 PROCEDURE — 85025 COMPLETE CBC W/AUTO DIFF WBC: CPT

## 2024-07-10 RX ORDER — ENOXAPARIN SODIUM 150 MG/ML
1 INJECTION SUBCUTANEOUS EVERY 12 HOURS
Qty: 20 ML | Refills: 0 | Status: SHIPPED | OUTPATIENT
Start: 2024-07-10 | End: 2024-07-20

## 2024-07-10 RX ORDER — NAPROXEN 500 MG/1
500 TABLET ORAL EVERY 12 HOURS PRN
Qty: 10 TABLET | Refills: 0 | Status: SHIPPED | OUTPATIENT
Start: 2024-07-10 | End: 2024-07-20

## 2024-07-10 RX ORDER — ACETAMINOPHEN 325 MG/1
975 TABLET ORAL ONCE
Status: COMPLETED | OUTPATIENT
Start: 2024-07-10 | End: 2024-07-10

## 2024-07-10 RX ORDER — KETOROLAC TROMETHAMINE 30 MG/ML
60 INJECTION, SOLUTION INTRAMUSCULAR; INTRAVENOUS ONCE
Status: COMPLETED | OUTPATIENT
Start: 2024-07-10 | End: 2024-07-10

## 2024-07-10 RX ADMIN — KETOROLAC TROMETHAMINE 60 MG: 30 INJECTION, SOLUTION INTRAMUSCULAR at 06:33

## 2024-07-10 RX ADMIN — ACETAMINOPHEN 975 MG: 325 TABLET, FILM COATED ORAL at 06:32

## 2024-07-10 NOTE — Clinical Note
Blanca Mason was seen and treated in our emergency department on 7/10/2024.        Other - See Comments        Diagnosis:     Blanca  may return to work on return date.    She may return on this date: 07/12/2024    Patient may perform her jobs only as tolerated/comfortable.     If you have any questions or concerns, please don't hesitate to call.      David Draper MD    ______________________________           _______________          _______________  Hospital Representative                              Date                                Time

## 2024-07-10 NOTE — ED PROVIDER NOTES
History  Chief Complaint   Patient presents with    Shoulder Pain     C/o of L shoulder pain      Patient is a 53-year-old female seen in the emergency department with concern for left shoulder pain over the past several days, worsening over approximately past day.  Patient notes pain worse with movement.  Patient notes history of rotator cuff surgery many years ago.  Patient notes no new trauma.  Patient notes no fever, chest pain, shortness of breath, abdominal pain, nausea, vomiting, weakness, numbness, tingling.  Patient notes minimal improvement with unknown prescribed medication at home.  Patient notes no other definite clear exacerbating or alleviating factors for her symptoms.        Prior to Admission Medications   Prescriptions Last Dose Informant Patient Reported? Taking?   Cholecalciferol (Vitamin D3) 125 MCG (5000 UT) capsule   No No   Sig: Take 1 capsule (5,000 Units total) by mouth daily   OXcarbazepine (TRILEPTAL) 150 mg tablet  Self No No   Sig: Take 3 tablets (450 mg total) by mouth every 12 (twelve) hours   Patient not taking: Reported on 6/4/2024   OXcarbazepine (TRILEPTAL) 300 mg tablet  Self Yes No   Sig: Take 300 mg by mouth every 12 (twelve) hours   atoMOXetine (STRATTERA) 40 mg capsule  Self No No   Sig: Take 1 capsule (40 mg total) by mouth daily   Patient not taking: Reported on 6/4/2024   atoMOXetine (STRATTERA) 60 mg capsule   Yes No   Sig: Take 60 mg by mouth daily   buPROPion (FORFIVO XL) 450 MG 24 hr tablet  Self No No   Sig: Take 1 tablet (450 mg total) by mouth daily   Patient not taking: Reported on 6/4/2024   buPROPion (WELLBUTRIN XL) 150 mg 24 hr tablet   Yes No   cloNIDine (CATAPRES) 0.2 mg tablet   No No   Sig: TAKE 1 TABLET (0.2 MG TOTAL) BY MOUTH EVERY 12 (TWELVE) HOURS   cyanocobalamin (VITAMIN B-12) 1000 MCG tablet   No No   Sig: Take 1 tablet (1,000 mcg total) by mouth daily   Patient not taking: Reported on 6/5/2024   furosemide (LASIX) 20 mg tablet   No No   Sig: Take 1  tablet (20 mg total) by mouth daily   levocetirizine (XYZAL) 5 MG tablet   No No   Sig: Take 1 tablet (5 mg total) by mouth every evening   levothyroxine (Euthyrox) 125 mcg tablet   No No   Sig: Take 1 tablet (125 mcg total) by mouth daily in the early morning   lidocaine (Lidoderm) 5 %   No No   Sig: Apply 1 patch topically over 12 hours daily Remove & Discard patch within 12 hours or as directed by MD   Patient not taking: Reported on 2024   losartan (COZAAR) 100 MG tablet   No No   Sig: Take 1 tablet (100 mg total) by mouth daily   lurasidone (LATUDA) 40 mg tablet  Self Yes No   metFORMIN (GLUCOPHAGE-XR) 750 mg 24 hr tablet   No No   Sig: Take 1 tablet (750 mg total) by mouth daily with breakfast   naltrexone (REVIA) 50 mg tablet   No No   Sig: TAKE 1 TABLET BY MOUTH DAILY   naproxen (Naprosyn) 500 mg tablet   No No   Sig: Take 1 tablet (500 mg total) by mouth 2 (two) times a day with meals for 10 days   oxybutynin (DITROPAN) 5 mg tablet   No No   Sig: Take 1 tablet (5 mg total) by mouth 2 (two) times a day   paliperidone palmitate ER (INVEGA) 234 mg/1.5 mL IM injection  Self No No   Si.5 mL (234 mg total) by IM- Deltoid route every 4 weeks Do not start before 2024.   pantoprazole (PROTONIX) 40 mg tablet  Self No No   Sig: Take 1 tablet (40 mg total) by mouth daily in the early morning   pilocarpine (SALAGEN) 5 mg tablet   No No   Sig: Take 1 tablet (5 mg total) by mouth 3 (three) times a day   topiramate (TOPAMAX) 200 MG tablet  Self No No   Sig: Take 1 tablet (200 mg total) by mouth 2 (two) times a day   warfarin (COUMADIN) 5 mg tablet   No No   Sig: Take 1 tablet (5 mg total) by mouth daily   warfarin (Coumadin) 2 mg tablet   No No   Sig: Please take as discussed on the phone: Saturday and  7 mg once a day (Monday through Friday 5 mg once a day)   Patient not taking: Reported on 2024      Facility-Administered Medications: None       Past Medical History:   Diagnosis Date     "Acute deep vein thrombosis of lower leg, left ([facility]) 10/16/2023    Acute heart failure, unspecified heart failure type ([facility]) 05/06/2024    Anxiety     Arthritis     oa cassandra knees    Asthma     good control- no medications    Yan's esophagus     Bipolar affective disorder ([facility])     Cannabis abuse with unspecified 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[facility])     Chronic pain disorder     lumbar    Contusion of elbow 12/21/2021    COPD (chronic obstructive pulmonary disease) ([facility])     CPAP (continuous positive airway pressure) dependence     DDD (degenerative disc disease), lumbar 04/09/2015    Degenerative disc disease at L5-S1 level     Deliberate self-cutting     9/15/22per pt has not done recently-- does see a therapist and psychiatrist regularly    Depression 09/16/2008    Diarrhea 01/06/2022    Disease of thyroid gland     hypo    MARTINEZ (dyspnea on exertion)     per pt \"has had this with exertion and not new\"    Drug overdose 10/28/2008    suicide attempt and again drug overdose 7/2022--[facility]Medical floor and than transferred to [facility]    Dysphagia     Dyspnea     Ecchymosis 01/06/2022    Edema     BLE    Elevated troponin 09/02/2023    Family history of blood clots     GERD (gastroesophageal reflux disease)     Grief 11/11/2023    Headache(784.0)     Headache, tension-type     Hemorrhage of rectum and anus 10/02/2017    Formatting of this note might be different from the original.  Added automatically from request for surgery [id number]    High blood pressure     History of COVID-19 12/30/2020    Symptoms started on 12/30/2020 and then got worse.  Today she is feeling a little bit better.  She is a candidate for monoclonal antibody infusion and set for 1/6/21 @ 1pm at [facility]. 01/07/21 - telemedicine -     Hyperlipidemia     Hypertension     Intentional overdose ([facility]) 09/27/2023    Intentional self-harm by unspecified sharp object, sequela ([facility]) 02/09/2023    Knee pain, bilateral     Especially right    Knee pain, " right 02/23/2022    Medial epicondylitis of elbow, left 04/03/2018    Formatting of this note might be different from the original.  Added automatically from request for surgery [id number]    Medial epicondylitis of right elbow 07/17/2023    Medical marijuana use     Memory difficulties 08/06/2020    Memory loss     Migraines     Obesity     Other psychoactive substance abuse with unspecified psychoactive substance-induced disorder (HCC) 05/06/2024    Overactive bladder     Polysubstance abuse (HCC) 09/29/2023    Potential for cognitive impairment 06/17/2021    Primary osteoarthritis of both knees 08/08/2018    Pulmonary emboli (HCC)     Restrictive lung disease 02/01/2017    Last Assessment & Plan:   Formatting of this note might be different from the original.  I reviewed this problem throughout the rest of the note. Please refer to the other assessments for details.    Sjogren's disease (HCC)     Sleep apnea     Stress incontinence     Suicidal deliberate poisoning (HCC) 07/13/2022    Suicidal ideations     Teeth missing     Toxic encephalopathy 11/08/2023    Tremor     per pt Tremors of Right Leg and both Arms    Visual impairment     Wears glasses        Past Surgical History:   Procedure Laterality Date    BREAST CYST EXCISION Right 1989    CARPAL TUNNEL RELEASE Left     CHOLECYSTECTOMY  05/2003    Laparoscopic    COLONOSCOPY      01/12/2009    DILATION AND CURETTAGE OF UTERUS      ELBOW SURGERY Left     x2. No hardware    ESOPHAGOGASTRODUODENOSCOPY      FOOT SURGERY Left     Plantar fasciotomy    HERNIA REPAIR      HYSTERECTOMY      laporoscopic, partial    KNEE ARTHROSCOPY Bilateral     OOPHORECTOMY Left 10/2015    WY GASTROCNEMIUS RECESSION Left 02/24/2021    Procedure: RECESSION GASTROC OPEN;  Surgeon: Nomi Yang DPM;  Location:  MAIN OR;  Service: Podiatry    WY RPR UMBILICAL HRNA 5 YRS/> REDUCIBLE N/A 04/24/2019    Procedure: REPAIR HERNIA UMBILICAL LAPAROSCOPIC W/ ROBOTICS;  Surgeon: Anahi  MD Darlene;  Location: [facility] Main OR;  Service: General    NV SPHINCTEROTOMY ANAL DIVISION SPHINCTER SPX N/A 2018    Procedure: EUA, LEFT PARTIAL INTERNAL SPHINCTEROTOMY;  Surgeon: Tien Reyes MD;  Location:  MAIN OR;  Service: Colorectal    NV TNOT ELBOW LATERAL/MEDIAL DEBRIDE OPEN TDN RPR Left 2022    Procedure: RELEASE EPICONDYLAR ELBOW MEDIAL;  Surgeon: Kyree Winkler MD;  Location[facility] MA[facility] OR;  Service: Orthopedics    REDUCTION MAMMAPLASTY Bilateral 1999    REPAIR LACERATION Left     left hand-2009    REPLACEMENT TOTAL KNEE Right     ROTATOR CUFF REPAIR Left     TONSILECTOMY AND ADNOIDECTOMY      US GUIDED MSK PROCEDURE  2021    VASCULAR SURGERY      VEIN LIGATION Bilateral     WISDOM TOOTH EXTRACTION         Family History   Problem Relation Age of Onset    Kidney cancer Mother     Diabetes Mother     Depression Mother     Stroke Mother     Arthritis Mother     Cancer Mother     Psychiatric Illness Mother     No Known Problems Father     No Known Problems Sister     No Known Problems Maternal Grandmother     No Known Problems Maternal Grandfather     No Known Problems Paternal Grandmother     No Known Problems Paternal Grandfather     Colon cancer Maternal Uncle     Colon cancer Maternal Uncle     Colon cancer Paternal Uncle     Colon cancer Family     Alcohol abuse Sister     Asthma Sister      I have reviewed and agree with the history as documented.    E-Cigarette/Vaping    E-Cigarette Use Never User     Comments medical THC      E-Cigarette/Vaping Substances    Nicotine No     THC Yes     CBD No     Flavoring No     Other No     Unknown No      Social History     Tobacco Use    Smoking status: Former     Current packs/day: 0.00     Average packs/day: 2.0 packs/day for 33.0 years (66.0 ttl pk-yrs)     Types: Cigarettes     Start date: 1985     Quit date: 2018     Years since quittin.5    Smokeless tobacco: Never    Tobacco comments:     Started at age 16.   Vaping  Use    Vaping status: Never Used   Substance Use Topics    Alcohol use: Not Currently     Comment: Recovering alcoholic    Drug use: Yes     Types: Marijuana     Comment: Former meth use, medicinal marijuana       Review of Systems   Constitutional:  Negative for chills and fever.   HENT:  Negative for ear pain and sore throat.    Eyes:  Negative for pain and visual disturbance.   Respiratory:  Negative for cough and shortness of breath.    Cardiovascular:  Negative for chest pain and palpitations.   Gastrointestinal:  Negative for abdominal pain and vomiting.   Musculoskeletal:  Negative for neck stiffness.        Left shoulder pain   Skin:  Negative for color change and rash.   Neurological:  Negative for weakness and numbness.   Psychiatric/Behavioral:  Negative for agitation and confusion.        Physical Exam  Physical Exam  Vitals and nursing note reviewed.   Constitutional:       General: She is not in acute distress.     Appearance: She is well-developed.   HENT:      Head: Normocephalic and atraumatic.      Right Ear: External ear normal.      Left Ear: External ear normal.      Nose: Nose normal.      Mouth/Throat:      Pharynx: Oropharynx is clear.   Eyes:      General: No scleral icterus.     Conjunctiva/sclera: Conjunctivae normal.   Cardiovascular:      Rate and Rhythm: Normal rate and regular rhythm.      Heart sounds: No murmur heard.  Pulmonary:      Effort: Pulmonary effort is normal. No respiratory distress.      Breath sounds: Normal breath sounds.   Abdominal:      General: Abdomen is flat. There is no distension.   Musculoskeletal:         General: Tenderness present. No swelling or deformity.      Cervical back: Normal range of motion and neck supple.      Comments: mild tenderness to palpation of lateral aspect of left shoulder; painful passive range of motion of left shoulder; normal exam of left elbow; neurovascularly intact left upper extremity   Skin:     General: Skin is warm and dry.    Neurological:      General: No focal deficit present.      Mental Status: She is alert.      Cranial Nerves: No cranial nerve deficit.      Sensory: No sensory deficit.   Psychiatric:         Mood and Affect: Mood normal.         Thought Content: Thought content normal.         Vital Signs  ED Triage Vitals [07/10/24 0603]   Temperature Pulse Respirations Blood Pressure SpO2   98.2 °F (36.8 °C) 99 16 150/77 96 %      Temp Source Heart Rate Source Patient Position - Orthostatic VS BP Location FiO2 (%)   Oral Monitor Sitting Right arm --      Pain Score       8           Vitals:    07/10/24 0603   BP: 150/77   Pulse: 99   Patient Position - Orthostatic VS: Sitting         Visual Acuity      ED Medications  Medications   ketorolac (TORADOL) injection 60 mg (60 mg Intramuscular Given 7/10/24 0633)   acetaminophen (TYLENOL) tablet 975 mg (975 mg Oral Given 7/10/24 0632)       Diagnostic Studies  Results Reviewed       None                   XR shoulder 2+ views LEFT   ED Interpretation by David Draper MD (07/10 0638)   No acute fracture or dislocation                 Procedures  ECG 12 Lead Documentation Only    Date/Time: 7/10/2024 6:22 AM    Performed by: David Draper MD  Authorized by: David Draper MD    Indications / Diagnosis:  Left shoulder pain  ECG reviewed by me, the ED Provider: yes    Patient location:  ED  Rate:     ECG rate:  93    ECG rate assessment: normal    Rhythm:     Rhythm: sinus rhythm    QRS:     QRS axis:  Left  ST segments:     ST segments:  Non-specific  T waves:     T waves: non-specific    Comments:      Normal sinus rhythm at 93, left axis deviation, , , QTc 450, nonspecific ST-T wave abnormality, no definite evidence of acute ischemia           ED Course                                 SBIRT 20yo+      Flowsheet Row Most Recent Value   Initial Alcohol Screen: US AUDIT-C     1. How often do you have a drink containing alcohol? 0 Filed at: 07/10/2024 0637   2. How  many drinks containing alcohol do you have on a typical day you are drinking?  0 Filed at: 07/10/2024 0637   3b. FEMALE Any Age, or MALE 65+: How often do you have 4 or more drinks on one occassion? 0 Filed at: 07/10/2024 0637   Audit-C Score 0 Filed at: 07/10/2024 0637   OLEG: How many times in the past year have you...    Used an illegal drug or used a prescription medication for non-medical reasons? Never Filed at: 07/10/2024 0637                      Medical Decision Making  Patient is a 53-year-old female seen in the emergency department with concern for left shoulder pain.  EKG was obtained and noted, and showed no definite evidence of arrhythmia or ischemia.  Patient was treated with medication for symptom control.  X-ray left shoulder showed no acute fracture or dislocation.  Evaluation is consistent with musculoskeletal pain, possibly due to rotator cuff injury versus inflammation.  Plan to treat patient with course of NSAID medication, and have patient follow up with orthopedics/outpatient providers.  Patient stable for discharge home.  Discharge instructions were reviewed with patient.    Problems Addressed:  Acute pain of left shoulder: acute illness or injury    Amount and/or Complexity of Data Reviewed  Radiology: ordered and independent interpretation performed. Decision-making details documented in ED Course.  ECG/medicine tests: ordered and independent interpretation performed. Decision-making details documented in ED Course.    Risk  OTC drugs.  Prescription drug management.                 Disposition  Final diagnoses:   Acute pain of left shoulder     Time reflects when diagnosis was documented in both MDM as applicable and the Disposition within this note       Time User Action Codes Description Comment    7/10/2024  6:09 AM David Draper Add [M25.512] Acute pain of left shoulder           ED Disposition       ED Disposition   Discharge    Condition   Stable    Date/Time   Wed Jul 10, 2024 0638     Comment   Blanca MEET Christensenspeedy discharge to home/self care.                   Follow-up Information       Follow up With Specialties Details Why Contact Info Additional Information    [facility] Surgery Call in 1 day  [address] [address] [phone] [facility] [address] [address] [phone]    JHONATAN Armando Nurse Practitioner Call in 1 day  [address] [facility] [address] [phone]               Patient's Medications   Discharge Prescriptions    NAPROXEN (NAPROSYN) 500 MG TABLET    Take 1 tablet (500 mg total) by mouth every 12 (twelve) hours as needed (pain) for up to 10 days Take with food.       Start Date: 7/10/2024 End Date: 7/20/2024       Order Dose: 500 mg       Quantity: 10 tablet    Refills: 0           PDMP Review         Value Time User    PDMP Reviewed  Yes 11/10/2023  2:54 PM JHONATAN Anne            ED Provider  Electronically Signed by             David Draper MD  07/10/24 0641

## 2024-07-10 NOTE — PROGRESS NOTES
HEMATOLOGY / ONCOLOGY CLINIC FOLLOW UP NOTE    Primary Care Provider: JHONATAN Armando  Referring Provider:    MRN: [id number]  : 1970    Reason for Encounter: Coumadin management         Interval History:   Patient presents to the office for Coumadin management for saddle PE on 2023 and left lower extremity DVT.  Patient has a PMH significant for suicidal ideations and attempts, major depressive disorder, hypothyroidism, Yan's esophagus, GERD, COPD, hypertension, chronic migraines, osteoarthritis, tremors, schizoaffective disorder bipolar type.  Patient has a long history of substance abuse, reports she has been clean since her last office visit.  She is currently working 2 days a week.     Patient was placed on Coumadin due to interaction with her psychiatric medication to control.    Patient is doing well overall.  She is working however, today she was in the ER due to left shoulder pain.  She has a follow-up with orthopedic surgery.  Patient endorses fatigue, shortness of breath.  Attributes it to her weight.  Patient reports that she is working with a nutritionist for healthy eating habits and trying to lose weight.  Patient denies any increased bruising. Patient denies abnormal bleeding: epistaxis, gingival bleeding, hematuria, dark tarry stools.  No fevers, increased fatigue, frequent infections, drenching night sweats, decreased appetite or unintentional weight loss.     Patient's INR has been therapeutic, between 2 and 3.  She is currently on Coumadin 5 mg daily  through  and Coumadin 7 mg  and Sundays.  We are checking her INR weekly.  Patient reports compliance with Coumadin.    Component      Latest Ref Rng 2024 2024 2024 2024 2024 7/3/2024   PROTIME      11.6 - 14.5 seconds 24.3 (H)  25.9 (H)  25.6 (H)  30.8 (H)  20.0 (H)  29.6 (H)    POCT INR      0.84 - 1.19  2.23 (H)  2.42 (H)  2.39 (H)  3.03 (H)  1.74 (H)  2.89 (H)       Patient  will be getting foot surgery on August 2.  She is here to review the plan for the surgery.       REVIEW OF SYSTEMS:  Please note that a 14-point review of systems was performed to include Constitutional, HEENT, Respiratory, CVS, GI, , Musculoskeletal, Integumentary, Neurologic, Rheumatologic, Endocrinologic, Psychiatric, Lymphatic, and Hematologic/Oncologic systems were reviewed and are negative unless otherwise stated in HPI. Positive and negative findings pertinent to this evaluation are incorporated into the history of present illness.      ECOG PS: 1      PROBLEM LIST:  Patient Active Problem List   Diagnosis    Yan's esophagus determined by biopsy    Benign essential hypertension    Schizoaffective disorder, bipolar type (HCC)    Chronic low back pain    Family history of renal cancer    Hypothyroidism    MAG (obstructive sleep apnea)    Overactive bladder    Major depressive disorder    Sjogren's syndrome (HCC)    COPD (chronic obstructive pulmonary disease) ([facility])    Mixed hyperlipidemia    Obesity, morbid, BMI 50 or higher ([facility])    Anticoagulation management encounter    Mood disorder (HCC)    Substance abuse (HCC)    Cognitive impairment    Anxiety    Borderline personality disorder (HCC)    Platelets decreased (HCC)    Chronic eczematous otitis externa of both ears    Chronic migraine without aura without status migrainosus, not intractable    Bilateral leg edema    Cervicalgia    History of pulmonary embolism    Restless leg syndrome    Lymphedema    History of DVT (deep vein thrombosis)    Ingrown toenail    Dyskinesia, drug-induced    Cervical spondylosis    Prediabetes       Assessment / Plan: 53-year-old female with history of DVT/PE on Coumadin indefinitely.  We reviewed the instructions for surgery:  Patient will stop Coumadin 5 days prior to surgery  She will resume anticoagulation per her surgeon's discretion when homeostasis is achieved  When started on Coumadin by her surgeon, patient will  restart at her current dose depending on the day/dose  We will bridge with Lovenox 1 mg/kg twice daily until INR therapeutic between 2 and 3  Reviewed how to give oneself subcu injection, patient feels comfortable giving herself an injection.    Will see patient back in the office in 6 months.  I will be talking to her weekly to make adjustments to her Coumadin if needed.  She is agreeable with the plan.  Patient aware to contact us for any additional questions/concerns or worsening symptoms.        I spent 20 minutes on chart review, face to face counseling time, coordination of care and documentation.    Past Medical History:   has a past medical history of Acute deep vein thrombosis of lower leg, left ([facility]) (10/16/2023), Acute heart failure, unspecified heart failure type ([facility]) (05/06/2024), Anxiety, Arthritis, Asthma, Yan's esophagus, Bipolar affective disorder ([facility]), Cannabis abuse with unspecified 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[facility]), Chronic pain disorder, Contusion of elbow (12/21/2021), COPD (chronic obstructive pulmonary disease) ([facility]), CPAP (continuous positive airway pressure) dependence, DDD (degenerative disc disease), lumbar (04/09/2015), Degenerative disc disease at L5-S1 level, Deliberate self-cutting, Depression (09/16/2008), Diarrhea (01/06/2022), Disease of thyroid gland, MARTINEZ (dyspnea on exertion), Drug overdose (10/28/2008), Dysphagia, Dyspnea, Ecchymosis (01/06/2022), Edema, Elevated troponin (09/02/2023), Family history of blood clots, GERD (gastroesophageal reflux disease), Grief (11/11/2023), Headache(784.0), Headache, tension-type, Hemorrhage of rectum and anus (10/02/2017), High blood pressure, History of COVID-19 (12/30/2020), Hyperlipidemia, Hypertension, Intentional overdose ([facility]) (09/27/2023), Intentional self-harm by unspecified sharp object, sequela ([facility]) (02/09/2023), Knee pain, bilateral, Knee pain, right (02/23/2022), Medial epicondylitis of elbow, left (04/03/2018), Medial  epicondylitis of right elbow (07/17/2023), Medical marijuana use, Memory difficulties (08/06/2020), Memory loss, Migraines, Obesity, Other psychoactive substance abuse with unspecified psychoactive substance-induced disorder (HCC) (05/06/2024), Overactive bladder, Polysubstance abuse (HCC) (09/29/2023), Potential for cognitive impairment (06/17/2021), Primary osteoarthritis of both knees (08/08/2018), Pulmonary emboli (HCC), Restrictive lung disease (02/01/2017), Sjogren's disease (HCC), Sleep apnea, Stress incontinence, Suicidal deliberate poisoning ([facility]) (07/13/2022), Suicidal ideations, Teeth missing, Toxic encephalopathy (11/08/2023), Tremor, Visual impairment, and Wears glasses.    PAST SURGICAL HISTORY:   has a past surgical history that includes REPAIR LACERATION (Left); Colonoscopy; TONSILECTOMY AND ADNOIDECTOMY; Esophagogastroduodenoscopy; Vein ligation (Bilateral); Elbow surgery (Left); Dilation and curettage of uterus; pr sphincterotomy anal division sphincter spx (N/A, 08/31/2018); Carpal tunnel release (Left); Knee arthroscopy (Bilateral); Cholecystectomy (05/2003); Foot surgery (Left); Gerber tooth extraction; pr rpr umbilical hrna 5 yrs/> reducible (N/A, 04/24/2019); Hysterectomy; Oophorectomy (Left, 10/2015); Reduction mammaplasty (Bilateral, 1999); Rotator cuff repair (Left); pr gastrocnemius recession (Left, 02/24/2021); US guided msk procedure (04/22/2021); Breast cyst excision (Right, 1989); Replacement total knee (Right); pr tnot elbow lateral/medial debride open tdn rpr (Left, 09/20/2022); Hernia repair; and Vascular surgery.    CURRENT MEDICATIONS  Current Outpatient Medications   Medication Sig Dispense Refill    enoxaparin (LOVENOX) 150 mg/mL injection Inject 0.9 mL (135 mg total) under the skin every 12 (twelve) hours for 10 days Start with your coumadin post surgery as discussed 20 mL 0    atoMOXetine (STRATTERA) 40 mg capsule Take 1 capsule (40 mg total) by mouth daily (Patient not  taking: Reported on 6/4/2024) 30 capsule 1    atoMOXetine (STRATTERA) 60 mg capsule Take 60 mg by mouth daily      buPROPion (FORFIVO XL) 450 MG 24 hr tablet Take 1 tablet (450 mg total) by mouth daily (Patient not taking: Reported on 6/4/2024) 30 tablet 1    buPROPion (WELLBUTRIN XL) 150 mg 24 hr tablet       Cholecalciferol (Vitamin D3) 125 MCG (5000 UT) capsule Take 1 capsule (5,000 Units total) by mouth daily 90 capsule 1    cloNIDine (CATAPRES) 0.2 mg tablet TAKE 1 TABLET (0.2 MG TOTAL) BY MOUTH EVERY 12 (TWELVE) HOURS 180 tablet 1    cyanocobalamin (VITAMIN B-12) 1000 MCG tablet Take 1 tablet (1,000 mcg total) by mouth daily (Patient not taking: Reported on 6/5/2024) 90 tablet 1    furosemide (LASIX) 20 mg tablet Take 1 tablet (20 mg total) by mouth daily 90 tablet 1    levocetirizine (XYZAL) 5 MG tablet Take 1 tablet (5 mg total) by mouth every evening 90 tablet 0    levothyroxine (Euthyrox) 125 mcg tablet Take 1 tablet (125 mcg total) by mouth daily in the early morning 90 tablet 1    lidocaine (Lidoderm) 5 % Apply 1 patch topically over 12 hours daily Remove & Discard patch within 12 hours or as directed by MD (Patient not taking: Reported on 6/4/2024) 30 patch 0    losartan (COZAAR) 100 MG tablet Take 1 tablet (100 mg total) by mouth daily 90 tablet 1    lurasidone (LATUDA) 40 mg tablet       metFORMIN (GLUCOPHAGE-XR) 750 mg 24 hr tablet Take 1 tablet (750 mg total) by mouth daily with breakfast 90 tablet 1    naltrexone (REVIA) 50 mg tablet TAKE 1 TABLET BY MOUTH DAILY 90 tablet 1    naproxen (Naprosyn) 500 mg tablet Take 1 tablet (500 mg total) by mouth every 12 (twelve) hours as needed (pain) for up to 10 days Take with food. 10 tablet 0    OXcarbazepine (TRILEPTAL) 150 mg tablet Take 3 tablets (450 mg total) by mouth every 12 (twelve) hours (Patient not taking: Reported on 6/4/2024) 180 tablet 1    OXcarbazepine (TRILEPTAL) 300 mg tablet Take 300 mg by mouth every 12 (twelve) hours      oxybutynin  (DITROPAN) 5 mg tablet Take 1 tablet (5 mg total) by mouth 2 (two) times a day 120 tablet 1    paliperidone palmitate ER (INVEGA) 234 mg/1.5 mL IM injection 1.5 mL (234 mg total) by IM- Deltoid route every 4 weeks Do not start before February 28, 2024.      pantoprazole (PROTONIX) 40 mg tablet Take 1 tablet (40 mg total) by mouth daily in the early morning 90 tablet 1    pilocarpine (SALAGEN) 5 mg tablet Take 1 tablet (5 mg total) by mouth 3 (three) times a day 270 tablet 0    topiramate (TOPAMAX) 200 MG tablet Take 1 tablet (200 mg total) by mouth 2 (two) times a day 60 tablet 1    warfarin (Coumadin) 2 mg tablet Please take as discussed on the phone: Saturday and Sunday 7 mg once a day (Monday through Friday 5 mg once a day) (Patient not taking: Reported on 6/4/2024) 90 tablet 1    warfarin (COUMADIN) 5 mg tablet Take 1 tablet (5 mg total) by mouth daily 90 tablet 1     No current facility-administered medications for this visit.     [email or web address]    SOCIAL HISTORY:   reports that she quit smoking about 6 years ago. Her smoking use included cigarettes. She started smoking about 39 years ago. She has a 66 pack-year smoking history. She has never used smokeless tobacco. She reports that she does not currently use alcohol. She reports current drug use. Drug: Marijuana.     FAMILY HISTORY:  family history includes Alcohol abuse in her sister; Arthritis in her mother; Asthma in her sister; Cancer in her mother; Colon cancer in her family, maternal uncle, maternal uncle, and paternal uncle; Depression in her mother; Diabetes in her mother; Kidney cancer in her mother; No Known Problems in her father, maternal grandfather, maternal grandmother, paternal grandfather, paternal grandmother, and sister; Psychiatric Illness in her mother; Stroke in her mother.     ALLERGIES:  is allergic to percocet [oxycodone-acetaminophen], povidone iodine, and chlorhexidine.      Physical Exam:  Vital Signs:   Visit Vitals  /80 (BP  "Location: Right arm, Patient Position: Sitting, Cuff Size: Adult)   Pulse 99   Temp (!) 97 °F (36.1 °C)   Resp 17   Ht 5' 6\" (1.676 m)   Wt (!) 138 kg (305 lb)   SpO2 94%   BMI 49.23 kg/m²   OB Status Hysterectomy   Smoking Status Former   BSA 2.39 m²     Body mass index is 49.23 kg/m².  Body surface area is 2.39 meters squared.    GEN: Alert, awake oriented x3, in no acute distress  HEENT- No pallor, icterus, cyanosis, no oral mucosal lesions,   LAD - no palpable cervical, clavicle, axillary, inguinal LAD  Heart- normal S1 S2, regular rate and rhythm, No murmur, rubs.   Lungs- clear breathing sound bilateral.   Abdomen- soft, Non tender, bowel sounds present  Extremities- No cyanosis, clubbing, edema  Neuro- No focal neurological deficit    Labs:  Lab Results   Component Value Date    WBC 9.52 05/25/2024    HGB 13.9 05/25/2024    HCT 43.8 05/25/2024    MCV 92 05/25/2024     05/25/2024     Lab Results   Component Value Date    SODIUM 140 05/25/2024    K 3.5 05/25/2024     05/25/2024    CO2 23 05/25/2024    AGAP 12 05/25/2024    BUN 12 05/25/2024    CREATININE 0.69 05/25/2024    GLUC 100 05/25/2024    GLUF 88 01/29/2024    CALCIUM 9.0 05/25/2024    AST 17 05/25/2024    ALT 18 05/25/2024    ALKPHOS 70 05/25/2024    TP 7.2 05/25/2024    TBILI 0.29 05/25/2024    EGFR 100 05/25/2024     "

## 2024-07-10 NOTE — PATIENT INSTRUCTIONS
Stop coumadin 5 days before surgery  Restart coumadin per your surgeon's instructions   When you restart your coumadin, start lovenox with the coumadin and you will inject twice a day (every 12 hours).    The coumadin will be at the same dose you are on depending on the day (Monday through Friday 5 mg, Saturday and Sunday 7mg)  Recheck INR one week after you start the above.

## 2024-07-11 ENCOUNTER — TELEPHONE (OUTPATIENT)
Dept: HEMATOLOGY ONCOLOGY | Facility: CLINIC | Age: 54
End: 2024-07-11

## 2024-07-11 LAB
ATRIAL RATE: 93 BPM
P AXIS: -10 DEGREES
PR INTERVAL: 178 MS
QRS AXIS: -31 DEGREES
QRSD INTERVAL: 106 MS
QT INTERVAL: 362 MS
QTC INTERVAL: 450 MS
T WAVE AXIS: 71 DEGREES
VENTRICULAR RATE: 93 BPM

## 2024-07-11 PROCEDURE — 93010 ELECTROCARDIOGRAM REPORT: CPT | Performed by: INTERNAL MEDICINE

## 2024-07-11 NOTE — TELEPHONE ENCOUNTER
Left a message for patient informing her that her INR is 2.88.  She can continue taking the same dose of Coumadin she has been which is Coumadin 5 mg Monday through Friday and 7 mg Saturday and Sunday.  We will check her INR next week.  She knows to call if she has any questions.

## 2024-07-15 DIAGNOSIS — Z51.81 ANTICOAGULATION MANAGEMENT ENCOUNTER: ICD-10-CM

## 2024-07-15 DIAGNOSIS — Z79.01 ANTICOAGULATION MANAGEMENT ENCOUNTER: ICD-10-CM

## 2024-07-15 DIAGNOSIS — Z86.718 HISTORY OF DVT (DEEP VEIN THROMBOSIS): ICD-10-CM

## 2024-07-15 DIAGNOSIS — Z86.711 HISTORY OF PULMONARY EMBOLISM: ICD-10-CM

## 2024-07-15 RX ORDER — CEFAZOLIN SODIUM 1 G/50ML
1000 SOLUTION INTRAVENOUS ONCE
Status: CANCELLED | OUTPATIENT
Start: 2024-08-02 | End: 2024-08-02

## 2024-07-15 RX ORDER — CEFAZOLIN SODIUM 2 G/50ML
2000 SOLUTION INTRAVENOUS ONCE
Status: CANCELLED | OUTPATIENT
Start: 2024-08-02

## 2024-07-16 RX ORDER — WARFARIN SODIUM 2 MG/1
TABLET ORAL
Qty: 90 TABLET | Refills: 0 | Status: SHIPPED | OUTPATIENT
Start: 2024-07-16

## 2024-07-17 ENCOUNTER — APPOINTMENT (OUTPATIENT)
Dept: LAB | Facility: CLINIC | Age: 54
End: 2024-07-17
Payer: MEDICARE

## 2024-07-17 ENCOUNTER — TELEPHONE (OUTPATIENT)
Dept: HEMATOLOGY ONCOLOGY | Facility: CLINIC | Age: 54
End: 2024-07-17

## 2024-07-17 ENCOUNTER — DOCUMENTATION (OUTPATIENT)
Dept: CASE MANAGEMENT | Facility: HOSPITAL | Age: 54
End: 2024-07-17

## 2024-07-17 ENCOUNTER — OFFICE VISIT (OUTPATIENT)
Dept: OBGYN CLINIC | Facility: CLINIC | Age: 54
End: 2024-07-17
Payer: MEDICARE

## 2024-07-17 VITALS
SYSTOLIC BLOOD PRESSURE: 135 MMHG | HEART RATE: 77 BPM | DIASTOLIC BLOOD PRESSURE: 86 MMHG | BODY MASS INDEX: 47.09 KG/M2 | WEIGHT: 293 LBS | HEIGHT: 66 IN

## 2024-07-17 DIAGNOSIS — Z86.711 HISTORY OF PULMONARY EMBOLISM: ICD-10-CM

## 2024-07-17 DIAGNOSIS — Z79.01 ANTICOAGULATION MANAGEMENT ENCOUNTER: ICD-10-CM

## 2024-07-17 DIAGNOSIS — Z86.718 HISTORY OF DVT (DEEP VEIN THROMBOSIS): ICD-10-CM

## 2024-07-17 DIAGNOSIS — M25.512 ACUTE PAIN OF LEFT SHOULDER: ICD-10-CM

## 2024-07-17 DIAGNOSIS — M75.32 CALCIFIC TENDONITIS OF LEFT SHOULDER: Primary | ICD-10-CM

## 2024-07-17 DIAGNOSIS — Z51.81 ANTICOAGULATION MANAGEMENT ENCOUNTER: ICD-10-CM

## 2024-07-17 LAB
INR PPP: 3.41 (ref 0.84–1.19)
PROTHROMBIN TIME: 33.7 SECONDS (ref 11.6–14.5)

## 2024-07-17 PROCEDURE — 99214 OFFICE O/P EST MOD 30 MIN: CPT | Performed by: ORTHOPAEDIC SURGERY

## 2024-07-17 PROCEDURE — 85610 PROTHROMBIN TIME: CPT

## 2024-07-17 PROCEDURE — 36415 COLL VENOUS BLD VENIPUNCTURE: CPT

## 2024-07-17 RX ORDER — NITROFURANTOIN 25; 75 MG/1; MG/1
CAPSULE ORAL
COMMUNITY
Start: 2024-06-19

## 2024-07-17 RX ORDER — TRAZODONE HYDROCHLORIDE 100 MG/1
TABLET ORAL
COMMUNITY
Start: 2024-06-21

## 2024-07-17 RX ORDER — METHYLPREDNISOLONE 4 MG/1
TABLET ORAL
Qty: 1 EACH | Refills: 0 | Status: SHIPPED | OUTPATIENT
Start: 2024-07-17

## 2024-07-17 RX ORDER — BENZTROPINE MESYLATE 1 MG/1
TABLET ORAL
COMMUNITY
Start: 2024-07-03

## 2024-07-17 RX ORDER — TROSPIUM CHLORIDE 20 MG/1
20 TABLET, FILM COATED ORAL 2 TIMES DAILY
COMMUNITY
Start: 2024-07-08 | End: 2025-07-08

## 2024-07-17 RX ORDER — DEUTETRABENAZINE 24 MG/1
TABLET, FILM COATED, EXTENDED RELEASE ORAL
COMMUNITY
Start: 2024-07-08

## 2024-07-17 NOTE — BEHAVIORAL HEALTH HIGH UTILIZER
Patient Name:Blanca Mason MRN:  [id number]         : 1970     Age: 53 y.o.    Sex: female   Utilization History:  (# of ED visits & IP admits; reasons)  Pt had 2 [facility]-ED visits from 2024-2024. ED presentations were related to SI with no plan or with a plan to overdose on medications. Pt intentionally overdosed on Flexeril twice and Trazodone twice. Pt reports periodic non-compliance with her medications.       Medical presentations were related to chest pain, right knee pain, withdrawing from meth, asthma exacerbation, fecal impaction, abdominal pain, toe or leg pain, dyspnea, SOB, bilateral leg edema, acute Saddle PE, acute respiratory failure, left shoulder pain. Treatment Recommendations & Presentation:  Presentation in the ED: Pt typically presents self to ED's. ED visits were related to SI with no plan or with a plan to overdose on medications. Pt intentionally overdosed on Flexeril twice and Trazodone twice. Pt reports periodic non-compliance with her medications.       Medical presentations were related to chest pain, right knee pain, withdrawing from meth, asthma exacerbation, fecal impaction, abdominal pain, toe or leg pain, dyspnea, SOB, bilateral leg edema, acute Saddle PE, acute respiratory failure.             ED Recommendations:  Following medical evaluation and treatment, allow pt time for de-escalation and to establish acute risk versus pt's chronic behavioral disturbances. Please contact pt's [facility] ACT team to develop a safe discharge plan for pt to return home at [phone]. Pt should be contacting her ACT team when in Crisis and formulating an action plan to include distress tolerance coping skills.   Pt should continue her medical treatment with her multiple medical speciality providers.       Home Medication Regimen: see most recent documentation in Epic, you can also verify pt's medications with her ACT team.        Recent Medical Work Ups:  (include Psych testing  or ECT)     Comprehensive labs - ,   CT's -   Xrays -   VAS -   ECG - 2024 Inpatient Recommendations:   Collaborate with pt's community resources to develop a comprehensive discharge plan.               Outpatient recommendations:  Pt should continue her medical and mental health care with her community providers and take her medications as prescribed.        Situation/Relevant Background Info:  Pt is a 53 year old female with a diagnosis of Schizoaffective Disorder/ Bipolar type, Borderline Personality Disorder and a past history of meth abuse (clean since 2023 but uses medical marijuana) and numerous behavioral health hospitalizations.     Pt lives alone in an apartment that is close to where her father resides and has the [facility] team. Pt's mother  a few years ago in  and pt reports this as a difficult issue for her.   Pt has experienced various medical issues in  and appears to be very preoccupied with her medical conditions, perceived medical issues and treatment and becomes frustrated if she feels she is not obtaining the medical care she needs in a timely manner. Pt has displayed demanding and agitated behaviors while on behavioral health units.  Pt was active in going to see her father in the community and helping him as well as working part-time approx 21 hours per week. It is unknown if pt is still working. Pt appears to have poor distress tolerance and impulsivity. Pt appears to want to control her discharge date from inpatient units.  Pt has multiple medical speciality providers in addition to her PCP office: [facility], [facility], [facility],  [facility], [facility],  Physical Therapy,  [facility].                     Diagnoses/Significant Problems (Medical & Psychiatric):        Schizoaffective disorder, bipolar type, Borderline Personality Disorder  Active Problems:    Benign essential hypertension    Edema     Hypothyroidism    MAG    Overactive bladder    Sjogren's syndrome (HCC)    COPD (chronic obstructive pulmonary disease) (HCC)    Medical clearance for psychiatric admission    History of pulmonary embolism    Ingrown toenail                 Drivers of repeated utilization:  preoccupation with medical concerns and issues, personality disordered behaviors, limited to poor coping skills, impulsivity, secondary gains from ED visits and hospitalizations.                                               Existing Community Resources & Supports:                 father Luigi Mason - [phone]     Patient Medical & Psychiatric Care Team:  [facility] team - [phone]  [facility] PCP - [phone]   [facility]  [facility]  [facility]  [facility]  [facility]  [facility]    Care plan date: 07/17/24                   Author:  Dennise Napier RN                 Date reviewed with patient:

## 2024-07-17 NOTE — TELEPHONE ENCOUNTER
Left a message for Blanca informing her that her INR is 3.41.  I like her to hold her Coumadin today and tomorrow and restart on Friday at 5 mg.  She will take 5 mg on Saturday and Sunday as well.  She will recheck her PT/INR next Wednesday and I will call with instructions at that time.  Asked patient to give us a call back to confirm that she received this message.  Direct call center number provided.

## 2024-07-17 NOTE — PROGRESS NOTES
"ORTHO CARE [facility] [address] [address] [phone]       Blanca Mason  [id number]  1970    ORTHOPAEDIC SURGERY OUTPATIENT NOTE  7/17/2024      HISTORY:  53 y.o. RHD female  who presents to the office today for evaluation of left shoulder pain. She states this has been ongoing for a few weeks. She denies any injury or trauma. She states she thinks it was related to how she was sleeping. She notes a constant pain to the anterior aspect of her shoulder that is worse with reaching overhead and behind her back. She notes weakness and decreased range of motion. She has been taking Naproxen for pain. She has a history of left shoulder rotator cuff repair approx 20-30 years ago.     Past Medical History:   Diagnosis Date    Acute deep vein thrombosis of lower leg, left ([facility]) 10/16/2023    Acute heart failure, unspecified heart failure type ([facility]) 05/06/2024    Anxiety     Arthritis     oa cassandra knees    Asthma     good control- no medications    Yan's esophagus     Bipolar affective disorder ([facility])     Cannabis abuse with unspecified 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[facility])     Chronic pain disorder     lumbar    Contusion of elbow 12/21/2021    COPD (chronic obstructive pulmonary disease) ([facility])     CPAP (continuous positive airway pressure) dependence     DDD (degenerative disc disease), lumbar 04/09/2015    Degenerative disc disease at L5-S1 level     Deliberate self-cutting     9/15/22per pt has not done recently-- does see a therapist and psychiatrist regularly    Depression 09/16/2008    Diarrhea 01/06/2022    Disease of thyroid gland     hypo    MARTINEZ (dyspnea on exertion)     per pt \"has had this with exertion and not new\"    Drug overdose 10/28/2008    suicide attempt and again drug overdose 7/2022-- [facility] floor and than transferred to [facility]    Dysphagia     Dyspnea     Ecchymosis 01/06/2022    Edema     BLE    " Elevated troponin 09/02/2023    Family history of blood clots     GERD (gastroesophageal reflux disease)     Grief 11/11/2023    Headache(784.0)     Headache, tension-type     Hemorrhage of rectum and anus 10/02/2017    Formatting of this note might be different from the original.  Added automatically from request for surgery [id number]    High blood pressure     History of COVID-19 12/30/2020    Symptoms started on 12/30/2020 and then got worse.  Today she is feeling a little bit better.  She is a candidate for monoclonal antibody infusion and set for 1/6/21 @ 1pm at [facility]. 01/07/21 - telemedicine -     Hyperlipidemia     Hypertension     Intentional overdose (HCC) 09/27/2023    Intentional self-harm by unspecified sharp object, sequela (HCC) 02/09/2023    Knee pain, bilateral     Especially right    Knee pain, right 02/23/2022    Medial epicondylitis of elbow, left 04/03/2018    Formatting of this note might be different from the original.  Added automatically from request for surgery [id number]    Medial epicondylitis of right elbow 07/17/2023    Medical marijuana use     Memory difficulties 08/06/2020    Memory loss     Migraines     Obesity     Other psychoactive substance abuse with unspecified psychoactive substance-induced disorder (HCC) 05/06/2024    Overactive bladder     Polysubstance abuse (HCC) 09/29/2023    Potential for cognitive impairment 06/17/2021    Primary osteoarthritis of both knees 08/08/2018    Pulmonary emboli (HCC)     Restrictive lung disease 02/01/2017    Last Assessment & Plan:   Formatting of this note might be different from the original.  I reviewed this problem throughout the rest of the note. Please refer to the other assessments for details.    Sjogren's disease (HCC)     Sleep apnea     Stress incontinence     Suicidal deliberate poisoning (HCC) 07/13/2022    Suicidal ideations     Teeth missing     Toxic encephalopathy 11/08/2023    Tremor     per pt Tremors of Right Leg and  both Arms    Visual impairment     Wears glasses        Past Surgical History:   Procedure Laterality Date    BREAST CYST EXCISION Right 1989    CARPAL TUNNEL RELEASE Left     CHOLECYSTECTOMY  05/2003    Laparoscopic    COLONOSCOPY      01/12/2009    DILATION AND CURETTAGE OF UTERUS      ELBOW SURGERY Left     x2. No hardware    ESOPHAGOGASTRODUODENOSCOPY      FOOT SURGERY Left     Plantar fasciotomy    HERNIA REPAIR      HYSTERECTOMY      laporoscopic, partial    KNEE ARTHROSCOPY Bilateral     OOPHORECTOMY Left 10/2015    ID GASTROCNEMIUS RECESSION Left 02/24/2021    Procedure: RECESSION GASTROC OPEN;  Surgeon: Nomi Yang DPM;  Location:  MAIN OR;  Service: Podiatry    ID RPR UMBILICAL HRNA 5 YRS/> REDUCIBLE N/A 04/24/2019    Procedure: REPAIR HERNIA UMBILICAL LAPAROSCOPIC W/ ROBOTICS;  Surgeon: Anahi Colon MD;  Location: [facility];  Service: General    ID SPHINCTEROTOMY ANAL DIVISION SPHINCTER SPX N/A 08/31/2018    Procedure: EUA, LEFT PARTIAL INTERNAL SPHINCTEROTOMY;  Surgeon: Tien Reyes MD;  Location:  MAIN OR;  Service: Colorectal    ID TNOT ELBOW LATERAL/MEDIAL DEBRIDE OPEN TDN RPR Left 09/20/2022    Procedure: RELEASE EPICONDYLAR ELBOW MEDIAL;  Surgeon: Kyree Winkler MD;  [facility]: [facility] MA[facility] OR;  Service: Orthopedics    REDUCTION MAMMAPLASTY Bilateral 1999    REPAIR LACERATION Left     left hand-5/18/2009    REPLACEMENT TOTAL KNEE Right     ROTATOR CUFF REPAIR Left     TONSILECTOMY AND ADNOIDECTOMY      US GUIDED MSK PROCEDURE  04/22/2021    VASCULAR SURGERY      VEIN LIGATION Bilateral     WISDOM TOOTH EXTRACTION         Social History     Socioeconomic History    Marital status: Single     Spouse name: Not on file    Number of children: Not on file    Years of education: Not on file    Highest education level: Not on file   Occupational History    Not on file   Tobacco Use    Smoking status: Former     Current packs/day: 0.00     Average packs/day: 2.0 packs/day for 33.0 years (66.0  ttl pk-yrs)     Types: Cigarettes     Start date: 1985     Quit date: 2018     Years since quittin.5    Smokeless tobacco: Never    Tobacco comments:     Started at age 16.   Vaping Use    Vaping status: Never Used   Substance and Sexual Activity    Alcohol use: Not Currently     Comment: Recovering alcoholic    Drug use: Yes     Types: Marijuana     Comment: Former meth use, medicinal marijuana    Sexual activity: Not Currently     Partners: Male     Comment: defer   Other Topics Concern    Not on file   Social History Narrative    Not on file     Social Determinants of Health     Financial Resource Strain: Low Risk  (2024)    Overall Financial Resource Strain (CARDIA)     Difficulty of Paying Living Expenses: Not hard at all   Food Insecurity: No Food Insecurity (2024)    Hunger Vital Sign     Worried About Running Out of Food in the Last Year: Never true     Ran Out of Food in the Last Year: Never true   Transportation Needs: No Transportation Needs (2024)    PRAPARE - Transportation     Lack of Transportation (Medical): No     Lack of Transportation (Non-Medical): No   Physical Activity: Not on file   Stress: Not on file   Social Connections: Not on file   Intimate Partner Violence: Not on file   Housing Stability: Unknown (2024)    Housing Stability Vital Sign     Unable to Pay for Housing in the Last Year: No     Number of Times Moved in the Last Year: Not on file     Homeless in the Last Year: No       Family History   Problem Relation Age of Onset    Kidney cancer Mother     Diabetes Mother     Depression Mother     Stroke Mother     Arthritis Mother     Cancer Mother     Psychiatric Illness Mother     No Known Problems Father     No Known Problems Sister     No Known Problems Maternal Grandmother     No Known Problems Maternal Grandfather     No Known Problems Paternal Grandmother     No Known Problems Paternal Grandfather     Colon cancer Maternal Uncle     Colon cancer Maternal  Uncle     Colon cancer Paternal Uncle     Colon cancer Family     Alcohol abuse Sister     Asthma Sister         Patient's Medications   New Prescriptions    No medications on file   Previous Medications    ATOMOXETINE (STRATTERA) 40 MG CAPSULE    Take 1 capsule (40 mg total) by mouth daily    ATOMOXETINE (STRATTERA) 60 MG CAPSULE    Take 60 mg by mouth daily    AUSTEDO XR 24 MG TB24        BENZTROPINE (COGENTIN) 1 MG TABLET        BUPROPION (FORFIVO XL) 450 MG 24 HR TABLET    Take 1 tablet (450 mg total) by mouth daily    BUPROPION (WELLBUTRIN XL) 150 MG 24 HR TABLET        CHOLECALCIFEROL (VITAMIN D3) 125 MCG (5000 UT) CAPSULE    Take 1 capsule (5,000 Units total) by mouth daily    CLONIDINE (CATAPRES) 0.2 MG TABLET    TAKE 1 TABLET (0.2 MG TOTAL) BY MOUTH EVERY 12 (TWELVE) HOURS    CYANOCOBALAMIN (VITAMIN B-12) 1000 MCG TABLET    Take 1 tablet (1,000 mcg total) by mouth daily    ENOXAPARIN (LOVENOX) 150 MG/ML INJECTION    Inject 0.9 mL (135 mg total) under the skin every 12 (twelve) hours for 10 days Start with your coumadin post surgery as discussed    FUROSEMIDE (LASIX) 20 MG TABLET    Take 1 tablet (20 mg total) by mouth daily    LEVOCETIRIZINE (XYZAL) 5 MG TABLET    Take 1 tablet (5 mg total) by mouth every evening    LEVOTHYROXINE (EUTHYROX) 125 MCG TABLET    Take 1 tablet (125 mcg total) by mouth daily in the early morning    LIDOCAINE (LIDODERM) 5 %    Apply 1 patch topically over 12 hours daily Remove & Discard patch within 12 hours or as directed by MD    LOSARTAN (COZAAR) 100 MG TABLET    Take 1 tablet (100 mg total) by mouth daily    LURASIDONE (LATUDA) 40 MG TABLET        METFORMIN (GLUCOPHAGE-XR) 750 MG 24 HR TABLET    Take 1 tablet (750 mg total) by mouth daily with breakfast    NALTREXONE (REVIA) 50 MG TABLET    TAKE 1 TABLET BY MOUTH DAILY    NAPROXEN (NAPROSYN) 500 MG TABLET    Take 1 tablet (500 mg total) by mouth every 12 (twelve) hours as needed (pain) for up to 10 days Take with food.     "NITROFURANTOIN (MACROBID) 100 MG CAPSULE        OXCARBAZEPINE (TRILEPTAL) 150 MG TABLET    Take 3 tablets (450 mg total) by mouth every 12 (twelve) hours    OXCARBAZEPINE (TRILEPTAL) 300 MG TABLET    Take 300 mg by mouth every 12 (twelve) hours    OXYBUTYNIN (DITROPAN) 5 MG TABLET    Take 1 tablet (5 mg total) by mouth 2 (two) times a day    PALIPERIDONE PALMITATE ER (INVEGA) 234 MG/1.5 ML IM INJECTION    1.5 mL (234 mg total) by IM- Deltoid route every 4 weeks Do not start before February 28, 2024.    PANTOPRAZOLE (PROTONIX) 40 MG TABLET    Take 1 tablet (40 mg total) by mouth daily in the early morning    PILOCARPINE (SALAGEN) 5 MG TABLET    Take 1 tablet (5 mg total) by mouth 3 (three) times a day    TOPIRAMATE (TOPAMAX) 200 MG TABLET    Take 1 tablet (200 mg total) by mouth 2 (two) times a day    TRAZODONE (DESYREL) 100 MG TABLET        TROSPIUM CHLORIDE (SANCTURA) 20 MG TABLET    Take 20 mg by mouth 2 (two) times a day    WARFARIN (COUMADIN) 2 MG TABLET    take 3 & 1/2 tablets (7MG) once a day Saturday and Sunday and take 2 & 1/2 tablets (5MG) once a day Monday through Friday    WARFARIN (COUMADIN) 5 MG TABLET    Take 1 tablet (5 mg total) by mouth daily   Modified Medications    No medications on file   Discontinued Medications    No medications on file       Allergies   Allergen Reactions    Percocet [Oxycodone-Acetaminophen] Headache     Severe headaches   (denies issues with Tylenol)    Povidone Iodine Rash     Unsure if betadine or gauze post surgical. Got rash on leg.   Has  Had itchy rashes after every surgery prep and IV insertion    Chlorhexidine Rash     Per pt \"able to use the liquid soap--thinkd reaction from the sponges or wipes\"        /86 (BP Location: Right arm, Patient Position: Sitting, Cuff Size: Standard)   Pulse 77   Ht 5' 6\" (1.676 m)   Wt (!) 138 kg (305 lb)   BMI 49.23 kg/m²      REVIEW OF SYSTEMS:  Constitutional: Negative.    HEENT: Negative.    Respiratory: Negative.  "   Skin: Negative.    Neurological: Negative.    Psychiatric/Behavioral: Negative.  Musculoskeletal: Negative except for that mentioned in the HPI.      PHYSICAL EXAM:  left shoulder  Passive 120 forward flexion  50 external rotation  50 Abduction  Left buttock internal rotation     IMAGING:  x-rays left shoulder performed on 7/10/24 demonstrate calcific tendonitis.    ASSESSMENT AND PLAN:  53 y.o. female with left shoulder calcific tendonitis    X-rays were reviewed in the office today which demonstrate calcific tendonitis. Discussed possible steroid injection versus oral steroids. The patient elected to proceed with oral steroids and a prescription was provided for a Medrol Dose Pack. A referral was also provided to formal therapy to work on motion. Follow up in 6 weeks if no improvement and we can consider a steroid injection at that time,     Scribe Attestation      I,:  Janey Do MA am acting as a scribe while in the presence of the attending physician.:       I,:  Ann Marie Ge DO personally performed the services described in this documentation    as scribed in my presence.:                 Scribe Attestation      I,:  Janey Do MA am acting as a scribe while in the presence of the attending physician.:       I,:  Ann Marie Ge DO personally performed the services described in this documentation    as scribed in my presence.:

## 2024-07-19 ENCOUNTER — RA CDI HCC (OUTPATIENT)
Dept: OTHER | Facility: HOSPITAL | Age: 54
End: 2024-07-19

## 2024-07-19 NOTE — TELEPHONE ENCOUNTER
Spoke with patient and made her aware that I spoke with JHONATAN Romero and she should hold her coumadin today and tomorrow and start taking 5mg on Sunday. I informed her that she will take this daily and re-check her INR on Wednesday next week. Patient verbalized understanding and is in agreement with the plan.

## 2024-07-19 NOTE — TELEPHONE ENCOUNTER
Patient just received message now. Patient took Coumadin on 7/17 and 7/18 (was supposed to hold on these days) but has not not taken Coumadin yet today 7/19. She would like to know how to proceed today and over the weekend.

## 2024-07-24 ENCOUNTER — TELEPHONE (OUTPATIENT)
Age: 54
End: 2024-07-24

## 2024-07-24 DIAGNOSIS — Z86.711 HISTORY OF PULMONARY EMBOLISM: ICD-10-CM

## 2024-07-24 RX ORDER — WARFARIN SODIUM 5 MG/1
5 TABLET ORAL
Qty: 90 TABLET | Refills: 1 | Status: SHIPPED | OUTPATIENT
Start: 2024-07-24 | End: 2025-01-20

## 2024-07-24 NOTE — TELEPHONE ENCOUNTER
Spoke with patient and informed her I sent the refill to the pharmacy.  In the note to the pharmacist I did put what happened.  Informed her to discuss with the pharmacy so they can contact her insurance company for her.  Patient will be obtaining her INR tomorrow and I will call her back once I have the results.  Patient voiced understanding.

## 2024-07-24 NOTE — TELEPHONE ENCOUNTER
Pt called to report that her cat knocked down her warfain 5 mg pill bottom and is unable to find it. She reports having the 4 mg, 2 mg, and 6 mg at home. She ask if James [facility] can resend a prescription to her pharmacy again for the 5 mg . Pt advised to call her insurance to let know about the situation to see if she can get a medication override to help pay for the lost bottle.

## 2024-07-25 ENCOUNTER — TELEPHONE (OUTPATIENT)
Dept: HEMATOLOGY ONCOLOGY | Facility: CLINIC | Age: 54
End: 2024-07-25

## 2024-07-25 ENCOUNTER — CONSULT (OUTPATIENT)
Dept: FAMILY MEDICINE CLINIC | Facility: CLINIC | Age: 54
End: 2024-07-25
Payer: MEDICARE

## 2024-07-25 ENCOUNTER — APPOINTMENT (OUTPATIENT)
Dept: LAB | Facility: CLINIC | Age: 54
End: 2024-07-25
Payer: COMMERCIAL

## 2024-07-25 VITALS
OXYGEN SATURATION: 96 % | SYSTOLIC BLOOD PRESSURE: 148 MMHG | BODY MASS INDEX: 47.09 KG/M2 | HEIGHT: 66 IN | TEMPERATURE: 97.3 F | HEART RATE: 88 BPM | DIASTOLIC BLOOD PRESSURE: 90 MMHG | WEIGHT: 293 LBS

## 2024-07-25 DIAGNOSIS — Z86.711 HISTORY OF PULMONARY EMBOLISM: ICD-10-CM

## 2024-07-25 DIAGNOSIS — M21.611 BUNION, RIGHT FOOT: ICD-10-CM

## 2024-07-25 DIAGNOSIS — I10 BENIGN ESSENTIAL HYPERTENSION: Chronic | ICD-10-CM

## 2024-07-25 DIAGNOSIS — Z01.818 PREOP EXAMINATION: Primary | ICD-10-CM

## 2024-07-25 DIAGNOSIS — M20.41 HAMMER TOE OF RIGHT FOOT: ICD-10-CM

## 2024-07-25 DIAGNOSIS — Z51.81 ANTICOAGULATION MANAGEMENT ENCOUNTER: ICD-10-CM

## 2024-07-25 DIAGNOSIS — Z79.01 ANTICOAGULATION MANAGEMENT ENCOUNTER: ICD-10-CM

## 2024-07-25 DIAGNOSIS — Z86.718 HISTORY OF DVT (DEEP VEIN THROMBOSIS): ICD-10-CM

## 2024-07-25 LAB
INR PPP: 1.8 (ref 0.84–1.19)
PROTHROMBIN TIME: 20.6 SECONDS (ref 11.6–14.5)

## 2024-07-25 PROCEDURE — 36415 COLL VENOUS BLD VENIPUNCTURE: CPT

## 2024-07-25 PROCEDURE — 85610 PROTHROMBIN TIME: CPT

## 2024-07-25 PROCEDURE — 99214 OFFICE O/P EST MOD 30 MIN: CPT

## 2024-07-25 RX ORDER — ENOXAPARIN SODIUM 150 MG/ML
1 INJECTION SUBCUTANEOUS EVERY 12 HOURS
Qty: 20 ML | Refills: 0 | Status: SHIPPED | OUTPATIENT
Start: 2024-07-25 | End: 2024-08-04

## 2024-07-25 NOTE — PRE-PROCEDURE INSTRUCTIONS
Pre-Surgery Instructions:   Medication Instructions    atoMOXetine (STRATTERA) 60 mg capsule Hold day of surgery.    Austedo XR 24 MG TB24 Hold day of surgery.    benztropine (COGENTIN) 1 mg tablet Take day of surgery.    buPROPion (WELLBUTRIN XL) 150 mg 24 hr tablet Take day of surgery.    Cholecalciferol (Vitamin D3) 125 MCG (5000 UT) capsule Hold day of surgery.    cloNIDine (CATAPRES) 0.2 mg tablet Take day of surgery.    furosemide (LASIX) 20 mg tablet Hold day of surgery.    levothyroxine (Euthyrox) 125 mcg tablet Take day of surgery.    losartan (COZAAR) 100 MG tablet Hold day of surgery.    metFORMIN (GLUCOPHAGE-XR) 750 mg 24 hr tablet Hold day of surgery.    naltrexone (REVIA) 50 mg tablet Hold day of surgery.    naproxen (Naprosyn) 500 mg tablet Stop taking 3 days prior to surgery.    OXcarbazepine (TRILEPTAL) 300 mg tablet Take day of surgery.    oxybutynin (DITROPAN) 5 mg tablet Hold day of surgery.    paliperidone palmitate ER (INVEGA) 234 mg/1.5 mL IM injection Takes every 3 months- next dose due in 2 months    pantoprazole (PROTONIX) 40 mg tablet Take day of surgery.    pilocarpine (SALAGEN) 5 mg tablet Hold day of surgery.    topiramate (TOPAMAX) 200 MG tablet Take day of surgery.    traZODone (DESYREL) 100 mg tablet Take day of surgery.    trospium chloride (SANCTURA) 20 mg tablet Hold day of surgery.    warfarin (COUMADIN) 2 mg tablet Instructions provided by MD- advised to hold for 5 days pre-op by hematology   Medication instructions for day surgery reviewed. Please use only a sip of water to take your instructed medications. Avoid all over the counter vitamins, supplements and NSAIDS for one week prior to surgery per anesthesia guidelines. Tylenol is ok to take as needed.     You will receive a call one business day prior to surgery with an arrival time and hospital directions. If your surgery is scheduled on a Monday, the hospital will be calling you on the Friday prior to your surgery. If you  have not heard from anyone by 8pm, please call the hospital supervisor through the hospital  at [phone]. ([place] [phone] or [place] [phone]).    Do not eat or drink anything after midnight the night before your surgery, including candy, mints, lifesavers, or chewing gum. Do not drink alcohol 24hrs before your surgery. Try not to smoke at least 24hrs before your surgery.       Follow the pre surgery showering instructions as listed in the “My Surgical Experience Booklet” or otherwise provided by your surgeon's office. Do not use a blade to shave the surgical area 1 week before surgery. It is okay to use a clean electric clippers up to 24 hours before surgery. Do not apply any lotions, creams, including makeup, cologne, deodorant, or perfumes after showering on the day of your surgery. Do not use dry shampoo, hair spray, hair gel, or any type of hair products.     No contact lenses, eye make-up, or artificial eyelashes. Remove nail polish, including gel polish, and any artificial, gel, or acrylic nails if possible. Remove all jewelry including rings and body piercing jewelry.     Wear causal clothing that is easy to take on and off. Consider your type of surgery.    Keep any valuables, jewelry, piercings at home. Please bring any specially ordered equipment (sling, braces) if indicated.    Arrange for a responsible person to drive you to and from the hospital on the day of your surgery. Please confirm the visitor policy for the day of your procedure when you receive your phone call with an arrival time.     Call the surgeon's office with any new illnesses, exposures, or additional questions prior to surgery.    Please reference your “My Surgical Experience Booklet” for additional information to prepare for your upcoming surgery.

## 2024-07-25 NOTE — PROGRESS NOTES
[facility] PRE-OPERATIVE EVALUATION  [facility] PHYSICIAN GROUP - [facility]    NAME: Blanca Mason  AGE: 53 y.o. SEX: female  : 1970     DATE: 2024    [facility] Pre-Operative Evaluation      Chief Complaint: Pre-operative Evaluation     Surgery: Bunion repair with lapidus bunionectomy, possible skin ostomy, second metatarsal osteotomy with plantar repair, second hammertoe repair  Anticipated Date of Surgery: 24  Referring Provider:  Kaz Bautista DPM      History of Present Illness:     Blanca Mason is a 53 y.o. female who presents to the office today for a preoperative consultation at the request of surgeon, Kaz Bautista DPM , who plans on performing Bunion repair with lapidus bunionectomy, possible skin ostomy, second metatarsal osteotomy with plantar repair, second hammertoe repair on 24. Planned anesthesia is IV sedation. Patient has a bleeding risk of: recent abnormal bleeding (History of DVT/PE). Patient does not have objections to receiving blood products if needed. Current anti-platelet/anti-coagulation medications that the patient is prescribed includes: Warfarin (Coumadin or Jantoven).      Assessment of Chronic Conditions:   - Cerebrovascular Disease: PE/DVT    - Hypertension: Well controlled on medication      Assessment of Cardiac Risk:  Denies unstable or severe angina or MI in the last 6 weeks or history of stent placement in the last year   Denies decompensated heart failure (e.g. New onset heart failure, NYHA functional class IV heart failure, or worsening existing heart failure)  Denies significant arrhythmias such as high grade AV block, symptomatic ventricular arrhythmia, newly recognized ventricular tachycardia, supraventricular tachycardia with resting heart rate >100, or symptomatic bradycardia  Denies severe heart valve disease including aortic stenosis or symptomatic mitral stenosis     Exercise  Capacity:  Able to walk 4 blocks without symptoms?: No  Able to walk 2 flights without symptoms?: No    Prior Anesthesia Reactions: No     Personal history of venous thromboembolic disease? Yes    History of steroid use for >2 weeks within last year? No         Review of Systems:     Review of Systems   Constitutional:  Negative for activity change, chills, fatigue and fever.   HENT:  Negative for congestion, ear pain, rhinorrhea, sore throat and trouble swallowing.    Eyes:  Negative for pain and visual disturbance.   Respiratory:  Negative for cough, chest tightness and shortness of breath.    Cardiovascular:  Negative for chest pain, palpitations and leg swelling.   Gastrointestinal:  Negative for abdominal pain, constipation, diarrhea, nausea and vomiting.   Genitourinary:  Negative for difficulty urinating, dysuria, hematuria and urgency.   Musculoskeletal:  Negative for arthralgias and back pain.   Skin:  Negative for color change and rash.   Neurological:  Negative for dizziness, seizures, syncope and headaches.   Psychiatric/Behavioral:  Negative for dysphoric mood. The patient is not nervous/anxious.    All other systems reviewed and are negative.      Current Problem List:     Patient Active Problem List   Diagnosis    Yan's esophagus determined by biopsy    Benign essential hypertension    Schizoaffective disorder, bipolar type ([facility])    Chronic low back pain    Family history of renal cancer    Hypothyroidism    MAG (obstructive sleep apnea)    Overactive bladder    Major depressive disorder    Sjogren's syndrome ([facility])    COPD (chronic obstructive pulmonary disease) ([facility])    Mixed hyperlipidemia    Obesity, morbid, BMI 50 or higher ([facility])    Anticoagulation management encounter    Mood disorder ([facility])    Substance abuse ([facility])    Cognitive impairment    Anxiety    Borderline personality disorder ([facility])    Platelets decreased ([facility])    Chronic eczematous otitis externa of both ears    Chronic migraine  "without aura without status migrainosus, not intractable    Bilateral leg edema    Cervicalgia    History of pulmonary embolism    Restless leg syndrome    Lymphedema    History of DVT (deep vein thrombosis)    Ingrown toenail    Dyskinesia, drug-induced    Cervical spondylosis    Prediabetes       Allergies:     Allergies   Allergen Reactions    Percocet [Oxycodone-Acetaminophen] Headache     Severe headaches   (denies issues with Tylenol)    Povidone Iodine Rash     Unsure if betadine or gauze post surgical. Got rash on leg.   Has  Had itchy rashes after every surgery prep and IV insertion    Chlorhexidine Rash     Per pt \"able to use the liquid soap--thinkd reaction from the sponges or wipes\"       Current Medications:       Current Outpatient Medications:     atoMOXetine (STRATTERA) 60 mg capsule, Take 60 mg by mouth daily, Disp: , Rfl:     Austedo XR 24 MG TB24, , Disp: , Rfl:     benztropine (COGENTIN) 1 mg tablet, , Disp: , Rfl:     buPROPion (WELLBUTRIN XL) 150 mg 24 hr tablet, , Disp: , Rfl:     Cholecalciferol (Vitamin D3) 125 MCG (5000 UT) capsule, Take 1 capsule (5,000 Units total) by mouth daily, Disp: 90 capsule, Rfl: 1    cloNIDine (CATAPRES) 0.2 mg tablet, TAKE 1 TABLET (0.2 MG TOTAL) BY MOUTH EVERY 12 (TWELVE) HOURS, Disp: 180 tablet, Rfl: 1    furosemide (LASIX) 20 mg tablet, Take 1 tablet (20 mg total) by mouth daily, Disp: 90 tablet, Rfl: 1    levocetirizine (XYZAL) 5 MG tablet, Take 1 tablet (5 mg total) by mouth every evening, Disp: 90 tablet, Rfl: 0    levothyroxine (Euthyrox) 125 mcg tablet, Take 1 tablet (125 mcg total) by mouth daily in the early morning, Disp: 90 tablet, Rfl: 1    losartan (COZAAR) 100 MG tablet, Take 1 tablet (100 mg total) by mouth daily, Disp: 90 tablet, Rfl: 1    lurasidone (LATUDA) 40 mg tablet, , Disp: , Rfl:     metFORMIN (GLUCOPHAGE-XR) 750 mg 24 hr tablet, Take 1 tablet (750 mg total) by mouth daily with breakfast, Disp: 90 tablet, Rfl: 1    methylPREDNISolone 4 " MG tablet therapy pack, Use as directed on package, Disp: 1 each, Rfl: 0    naltrexone (REVIA) 50 mg tablet, TAKE 1 TABLET BY MOUTH DAILY, Disp: 90 tablet, Rfl: 1    nitrofurantoin (MACROBID) 100 mg capsule, , Disp: , Rfl:     OXcarbazepine (TRILEPTAL) 300 mg tablet, Take 300 mg by mouth every 12 (twelve) hours, Disp: , Rfl:     oxybutynin (DITROPAN) 5 mg tablet, Take 1 tablet (5 mg total) by mouth 2 (two) times a day, Disp: 120 tablet, Rfl: 1    paliperidone palmitate ER (INVEGA) 234 mg/1.5 mL IM injection, 1.5 mL (234 mg total) by IM- Deltoid route every 4 weeks Do not start before February 28, 2024., Disp: , Rfl:     pantoprazole (PROTONIX) 40 mg tablet, Take 1 tablet (40 mg total) by mouth daily in the early morning, Disp: 90 tablet, Rfl: 1    pilocarpine (SALAGEN) 5 mg tablet, Take 1 tablet (5 mg total) by mouth 3 (three) times a day, Disp: 270 tablet, Rfl: 0    traZODone (DESYREL) 100 mg tablet, , Disp: , Rfl:     trospium chloride (SANCTURA) 20 mg tablet, Take 20 mg by mouth 2 (two) times a day, Disp: , Rfl:     warfarin (COUMADIN) 2 mg tablet, take 3 & 1/2 tablets (7MG) once a day Saturday and Sunday and take 2 & 1/2 tablets (5MG) once a day Monday through Friday, Disp: 90 tablet, Rfl: 0    warfarin (COUMADIN) 5 mg tablet, Take 1 tablet (5 mg total) by mouth daily, Disp: 90 tablet, Rfl: 1    atoMOXetine (STRATTERA) 40 mg capsule, Take 1 capsule (40 mg total) by mouth daily (Patient not taking: Reported on 6/4/2024), Disp: 30 capsule, Rfl: 1    buPROPion (FORFIVO XL) 450 MG 24 hr tablet, Take 1 tablet (450 mg total) by mouth daily (Patient not taking: Reported on 6/4/2024), Disp: 30 tablet, Rfl: 1    cyanocobalamin (VITAMIN B-12) 1000 MCG tablet, Take 1 tablet (1,000 mcg total) by mouth daily (Patient not taking: Reported on 6/5/2024), Disp: 90 tablet, Rfl: 1    enoxaparin (LOVENOX) 150 mg/mL injection, Inject 0.9 mL (135 mg total) under the skin every 12 (twelve) hours for 10 days Start with your coumadin  "post surgery as discussed, Disp: 20 mL, Rfl: 0    lidocaine (Lidoderm) 5 %, Apply 1 patch topically over 12 hours daily Remove & Discard patch within 12 hours or as directed by MD (Patient not taking: Reported on 6/4/2024), Disp: 30 patch, Rfl: 0    naproxen (Naprosyn) 500 mg tablet, Take 1 tablet (500 mg total) by mouth every 12 (twelve) hours as needed (pain) for up to 10 days Take with food., Disp: 10 tablet, Rfl: 0    OXcarbazepine (TRILEPTAL) 150 mg tablet, Take 3 tablets (450 mg total) by mouth every 12 (twelve) hours (Patient not taking: Reported on 6/4/2024), Disp: 180 tablet, Rfl: 1    topiramate (TOPAMAX) 200 MG tablet, Take 1 tablet (200 mg total) by mouth 2 (two) times a day, Disp: 60 tablet, Rfl: 1    Past Medical History:       Past Medical History:   Diagnosis Date    Acute deep vein thrombosis of lower leg, left ([facility]) 10/16/2023    Acute heart failure, unspecified heart failure type ([facility]) 05/06/2024    Anxiety     Arthritis     oa cassandra knees    Asthma     good control- no medications    Yan's esophagus     Bipolar affective disorder ([facility])     Cannabis abuse with unspecified 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[facility])     Chronic pain disorder     lumbar    Contusion of elbow 12/21/2021    COPD (chronic obstructive pulmonary disease) ([facility])     CPAP (continuous positive airway pressure) dependence     DDD (degenerative disc disease), lumbar 04/09/2015    Degenerative disc disease at L5-S1 level     Deliberate self-cutting     9/15/22per pt has not done recently-- does see a therapist and psychiatrist regularly    Depression 09/16/2008    Diarrhea 01/06/2022    Disease of thyroid gland     hypo    MARTINEZ (dyspnea on exertion)     per pt \"has had this with exertion and not new\"    Drug overdose 10/28/2008    suicide attempt and again drug overdose 7/2022-- [facility] floor and than transferred to [facility]    Dysphagia     Dyspnea     Ecchymosis 01/06/2022    Edema     BLE    Elevated troponin 09/02/2023    " Family history of blood clots     GERD (gastroesophageal reflux disease)     Grief 11/11/2023    Headache(784.0)     Headache, tension-type     Hemorrhage of rectum and anus 10/02/2017    Formatting of this note might be different from the original.  Added automatically from request for surgery [id number]    High blood pressure     History of COVID-19 12/30/2020    Symptoms started on 12/30/2020 and then got worse.  Today she is feeling a little bit better.  She is a candidate for monoclonal antibody infusion and set for 1/6/21 @ 1pm at [facility]. 01/07/21 - telemedicine -     Hyperlipidemia     Hypertension     Intentional overdose (HCC) 09/27/2023    Intentional self-harm by unspecified sharp object, sequela (HCC) 02/09/2023    Knee pain, bilateral     Especially right    Knee pain, right 02/23/2022    Medial epicondylitis of elbow, left 04/03/2018    Formatting of this note might be different from the original.  Added automatically from request for surgery [id number]    Medial epicondylitis of right elbow 07/17/2023    Medical marijuana use     Memory difficulties 08/06/2020    Memory loss     Migraines     Obesity     Other psychoactive substance abuse with unspecified psychoactive substance-induced disorder (HCC) 05/06/2024    Overactive bladder     Polysubstance abuse (HCC) 09/29/2023    Potential for cognitive impairment 06/17/2021    Primary osteoarthritis of both knees 08/08/2018    Pulmonary emboli (HCC)     Restrictive lung disease 02/01/2017    Last Assessment & Plan:   Formatting of this note might be different from the original.  I reviewed this problem throughout the rest of the note. Please refer to the other assessments for details.    Sjogren's disease (HCC)     Sleep apnea     Stress incontinence     Suicidal deliberate poisoning (HCC) 07/13/2022    Suicidal ideations     Teeth missing     Toxic encephalopathy 11/08/2023    Tremor     per pt Tremors of Right Leg and both Arms    Visual impairment      Wears glasses         Past Surgical History:   Procedure Laterality Date    BREAST CYST EXCISION Right 1989    CARPAL TUNNEL RELEASE Left     CHOLECYSTECTOMY  05/2003    Laparoscopic    COLONOSCOPY      01/12/2009    DILATION AND CURETTAGE OF UTERUS      ELBOW SURGERY Left     x2. No hardware    ESOPHAGOGASTRODUODENOSCOPY      FOOT SURGERY Left     Plantar fasciotomy    HERNIA REPAIR      HYSTERECTOMY      laporoscopic, partial    KNEE ARTHROSCOPY Bilateral     OOPHORECTOMY Left 10/2015    WY GASTROCNEMIUS RECESSION Left 02/24/2021    Procedure: RECESSION GASTROC OPEN;  Surgeon: Nomi Yang DPM;  Location: [facility] MAIN OR;  Service: Podiatry    WY RPR UMBILICAL HRNA 5 YRS/> REDUCIBLE N/A 04/24/2019    Procedure: REPAIR HERNIA UMBILICAL LAPAROSCOPIC W/ ROBOTICS;  Surgeon: Anahi Colon MD;  Location: [facility] Main OR;  Service: General    WY SPHINCTEROTOMY ANAL DIVISION SPHINCTER SPX N/A 08/31/2018    Procedure: EUA, LEFT PARTIAL INTERNAL SPHINCTEROTOMY;  Surgeon: Tien Reyes MD;  Location:  MAIN OR;  Service: Colorectal    WY TNOT ELBOW LATERAL/MEDIAL DEBRIDE OPEN TDN RPR Left 09/20/2022    Procedure: RELEASE EPICONDYLAR ELBOW MEDIAL;  Surgeon: Kyree Winkler MD;  Location: [facility] MAIN OR;  Service: Orthopedics    REDUCTION MAMMAPLASTY Bilateral 1999    REPAIR LACERATION Left     left hand-5/18/2009    REPLACEMENT TOTAL KNEE Right     ROTATOR CUFF REPAIR Left     TONSILECTOMY AND ADNOIDECTOMY      US GUIDED MSK PROCEDURE  04/22/2021    VASCULAR SURGERY      VEIN LIGATION Bilateral     WISDOM TOOTH EXTRACTION          Family History   Problem Relation Age of Onset    Kidney cancer Mother     Diabetes Mother     Depression Mother     Stroke Mother     Arthritis Mother     Cancer Mother     Psychiatric Illness Mother     No Known Problems Father     No Known Problems Sister     No Known Problems Maternal Grandmother     No Known Problems Maternal Grandfather     No Known Problems Paternal Grandmother     No  Known Problems Paternal Grandfather     Colon cancer Maternal Uncle     Colon cancer Maternal Uncle     Colon cancer Paternal Uncle     Colon cancer Family     Alcohol abuse Sister     Asthma Sister         Social History     Socioeconomic History    Marital status: Single     Spouse name: Not on file    Number of children: Not on file    Years of education: Not on file    Highest education level: Not on file   Occupational History    Not on file   Tobacco Use    Smoking status: Former     Current packs/day: 0.00     Average packs/day: 2.0 packs/day for 33.0 years (66.0 ttl pk-yrs)     Types: Cigarettes     Start date: 1985     Quit date: 2018     Years since quittin.5    Smokeless tobacco: Never    Tobacco comments:     Started at age 16.   Vaping Use    Vaping status: Never Used   Substance and Sexual Activity    Alcohol use: Not Currently     Comment: Recovering alcoholic    Drug use: Yes     Types: Marijuana     Comment: Former meth use, medicinal marijuana    Sexual activity: Not Currently     Partners: Male     Comment: defer   Other Topics Concern    Not on file   Social History Narrative    Not on file     Social Determinants of Health     Financial Resource Strain: Low Risk  (2024)    Overall Financial Resource Strain (CARDIA)     Difficulty of Paying Living Expenses: Not hard at all   Food Insecurity: No Food Insecurity (2024)    Hunger Vital Sign     Worried About Running Out of Food in the Last Year: Never true     Ran Out of Food in the Last Year: Never true   Transportation Needs: No Transportation Needs (2024)    PRAPARE - Transportation     Lack of Transportation (Medical): No     Lack of Transportation (Non-Medical): No   Physical Activity: Not on file   Stress: Not on file   Social Connections: Not on file   Intimate Partner Violence: Not on file   Housing Stability: Unknown (2024)    Housing Stability Vital Sign     Unable to Pay for Housing in the Last Year: No      "Number of Times Moved in the Last Year: Not on file     Homeless in the Last Year: No        Physical Exam:     /90 (BP Location: Right arm, Patient Position: Sitting, Cuff Size: Large)   Pulse 88   Temp (!) 97.3 °F (36.3 °C)   Ht 5' 6\" (1.676 m)   Wt (!) 139 kg (306 lb 9.6 oz)   SpO2 96%   BMI 49.49 kg/m²     Physical Exam  Vitals and nursing note reviewed.   Constitutional:       Appearance: Normal appearance. She is normal weight.   HENT:      Head: Normocephalic.      Right Ear: Tympanic membrane, ear canal and external ear normal. There is no impacted cerumen.      Left Ear: Tympanic membrane, ear canal and external ear normal. There is no impacted cerumen.      Nose: Nose normal.      Mouth/Throat:      Mouth: Mucous membranes are moist.      Pharynx: Oropharynx is clear.   Eyes:      Extraocular Movements: Extraocular movements intact.      Conjunctiva/sclera: Conjunctivae normal.      Pupils: Pupils are equal, round, and reactive to light.   Cardiovascular:      Rate and Rhythm: Normal rate and regular rhythm.      Pulses: Normal pulses.      Heart sounds: Normal heart sounds.   Pulmonary:      Effort: Pulmonary effort is normal.      Breath sounds: Normal breath sounds.   Abdominal:      General: Bowel sounds are normal. There is no distension.      Palpations: Abdomen is soft.      Tenderness: There is no abdominal tenderness.   Musculoskeletal:         General: Normal range of motion.      Cervical back: Normal range of motion.   Skin:     General: Skin is warm.      Capillary Refill: Capillary refill takes less than 2 seconds.   Neurological:      General: No focal deficit present.      Mental Status: She is alert and oriented to person, place, and time. Mental status is at baseline.   Psychiatric:         Mood and Affect: Mood normal.         Behavior: Behavior normal.         Thought Content: Thought content normal.         Judgment: Judgment normal.          Data:     Pre-operative " work-up    Laboratory Results: I have personally reviewed the pertinent laboratory results/reports      EKG: I have personally reviewed pertinent reports.      Chest x-ray:  N/A      Previous cardiopulmonary studies within the past year:  Echocardiogram: n/a  Cardiac Catheterization: n/a  Stress Test: n/a  Pulmonary Function Testing: n/a      Assessment & Recommendations:     1. Preop examination        2. Bunion, right foot        3. Hammer toe of right foot        4. Benign essential hypertension            Pre-Op Evaluation Assessment  53 y.o. female with planned surgery: Bunion repair with lapidus bunionectomy, possible skin ostomy, second metatarsal osteotomy with plantar repair, second hammertoe repair.    Known risk factors for perioperative complications: Coronary disease.        Current medications which may produce withdrawal symptoms if withheld perioperatively: none.    Pre-Op Evaluation Plan  1. Further preoperative workup as follows:   - None; no further preoperative work-up is required    2. Medication Management/Recommendations:   - None, continue medication regimen including morning of surgery, with sip of water  - Patient has been instructed to avoid herbs or non-directed vitamins the week prior to surgery to ensure no drug interactions with perioperative surgical and anesthetic medications.  - Patient should continue antihypertensive medications up through and including the day of surgery.   - Patient should hold diuretic medication the day of surgery.  - Hold metformin the morning of surgery and do not resume until 48 hours AFTER surgery to avoid risk of lactic acidosis. Do not resume if eGFR is < 30  - Patient has been instructed to avoid aspirin containing medications or non-steroidal anti-inflammatory drugs for the week preceding surgery.    3. Prophylaxis for cardiac events with perioperative beta-blockers: not indicated.    4. Patient requires further consultation with:  None    Clearance  Patient is CLEARED for surgery without any additional cardiac testing.     JHONATAN Armando  [place] [address] [place] [address]  Phone#  [phone]  Fax#  [phone]

## 2024-07-25 NOTE — TELEPHONE ENCOUNTER
Spoke with patient to inform her that her INR was 1.8.  Patient reports she has not missed any doses.  Recommend she take Coumadin 7 mg today and tomorrow then drop back down to 5 mg with repeat INR next week.    Patient also asking if she should go  her Lovenox prior to her surgery for her bridging postop.  Informed that I had already sent the order over however it looks like it was canceled several refills we sent the Lovenox to her pharmacy.  Recommend she go pick it up prior to the surgery.  Patient voiced understanding.

## 2024-07-25 NOTE — ASSESSMENT & PLAN NOTE
Today's /110 repeat 148/90  Current medication:  Losartan 100mg, Lasix 20mg, Clonidine 0.2mg BID  Continue current medication regimen   Advised patient on symptoms of hypotension & severe HTN  Limit salt-intake & caffeine. DASH diet discussed, minimize stress level  Weight reduction efforts via improved diet & increased exercise

## 2024-07-25 NOTE — H&P (VIEW-ONLY)
[facility] PRE-OPERATIVE EVALUATION  [facility] PHYSICIAN GROUP - [facility]    NAME: Blanca Mason  AGE: 53 y.o. SEX: female  : 1970     DATE: 2024    [facility] Pre-Operative Evaluation      Chief Complaint: Pre-operative Evaluation     Surgery: Bunion repair with lapidus bunionectomy, possible skin ostomy, second metatarsal osteotomy with plantar repair, second hammertoe repair  Anticipated Date of Surgery: 24  Referring Provider:  Kaz Bautista DPM      History of Present Illness:     Blanca Mason is a 53 y.o. female who presents to the office today for a preoperative consultation at the request of surgeon, Kaz Bautista DPM , who plans on performing Bunion repair with lapidus bunionectomy, possible skin ostomy, second metatarsal osteotomy with plantar repair, second hammertoe repair on 24. Planned anesthesia is IV sedation. Patient has a bleeding risk of: recent abnormal bleeding (History of DVT/PE). Patient does not have objections to receiving blood products if needed. Current anti-platelet/anti-coagulation medications that the patient is prescribed includes: Warfarin (Coumadin or Jantoven).      Assessment of Chronic Conditions:   - Cerebrovascular Disease: PE/DVT    - Hypertension: Well controlled on medication      Assessment of Cardiac Risk:  Denies unstable or severe angina or MI in the last 6 weeks or history of stent placement in the last year   Denies decompensated heart failure (e.g. New onset heart failure, NYHA functional class IV heart failure, or worsening existing heart failure)  Denies significant arrhythmias such as high grade AV block, symptomatic ventricular arrhythmia, newly recognized ventricular tachycardia, supraventricular tachycardia with resting heart rate >100, or symptomatic bradycardia  Denies severe heart valve disease including aortic stenosis or symptomatic mitral stenosis     Exercise  Capacity:  Able to walk 4 blocks without symptoms?: No  Able to walk 2 flights without symptoms?: No    Prior Anesthesia Reactions: No     Personal history of venous thromboembolic disease? Yes    History of steroid use for >2 weeks within last year? No         Review of Systems:     Review of Systems   Constitutional:  Negative for activity change, chills, fatigue and fever.   HENT:  Negative for congestion, ear pain, rhinorrhea, sore throat and trouble swallowing.    Eyes:  Negative for pain and visual disturbance.   Respiratory:  Negative for cough, chest tightness and shortness of breath.    Cardiovascular:  Negative for chest pain, palpitations and leg swelling.   Gastrointestinal:  Negative for abdominal pain, constipation, diarrhea, nausea and vomiting.   Genitourinary:  Negative for difficulty urinating, dysuria, hematuria and urgency.   Musculoskeletal:  Negative for arthralgias and back pain.   Skin:  Negative for color change and rash.   Neurological:  Negative for dizziness, seizures, syncope and headaches.   Psychiatric/Behavioral:  Negative for dysphoric mood. The patient is not nervous/anxious.    All other systems reviewed and are negative.      Current Problem List:     Patient Active Problem List   Diagnosis    Yan's esophagus determined by biopsy    Benign essential hypertension    Schizoaffective disorder, bipolar type ([facility])    Chronic low back pain    Family history of renal cancer    Hypothyroidism    MAG (obstructive sleep apnea)    Overactive bladder    Major depressive disorder    Sjogren's syndrome ([facility])    COPD (chronic obstructive pulmonary disease) ([facility])    Mixed hyperlipidemia    Obesity, morbid, BMI 50 or higher ([facility])    Anticoagulation management encounter    Mood disorder ([facility])    Substance abuse ([facility])    Cognitive impairment    Anxiety    Borderline personality disorder ([facility])    Platelets decreased ([facility])    Chronic eczematous otitis externa of both ears    Chronic migraine  "without aura without status migrainosus, not intractable    Bilateral leg edema    Cervicalgia    History of pulmonary embolism    Restless leg syndrome    Lymphedema    History of DVT (deep vein thrombosis)    Ingrown toenail    Dyskinesia, drug-induced    Cervical spondylosis    Prediabetes       Allergies:     Allergies   Allergen Reactions    Percocet [Oxycodone-Acetaminophen] Headache     Severe headaches   (denies issues with Tylenol)    Povidone Iodine Rash     Unsure if betadine or gauze post surgical. Got rash on leg.   Has  Had itchy rashes after every surgery prep and IV insertion    Chlorhexidine Rash     Per pt \"able to use the liquid soap--thinkd reaction from the sponges or wipes\"       Current Medications:       Current Outpatient Medications:     atoMOXetine (STRATTERA) 60 mg capsule, Take 60 mg by mouth daily, Disp: , Rfl:     Austedo XR 24 MG TB24, , Disp: , Rfl:     benztropine (COGENTIN) 1 mg tablet, , Disp: , Rfl:     buPROPion (WELLBUTRIN XL) 150 mg 24 hr tablet, , Disp: , Rfl:     Cholecalciferol (Vitamin D3) 125 MCG (5000 UT) capsule, Take 1 capsule (5,000 Units total) by mouth daily, Disp: 90 capsule, Rfl: 1    cloNIDine (CATAPRES) 0.2 mg tablet, TAKE 1 TABLET (0.2 MG TOTAL) BY MOUTH EVERY 12 (TWELVE) HOURS, Disp: 180 tablet, Rfl: 1    furosemide (LASIX) 20 mg tablet, Take 1 tablet (20 mg total) by mouth daily, Disp: 90 tablet, Rfl: 1    levocetirizine (XYZAL) 5 MG tablet, Take 1 tablet (5 mg total) by mouth every evening, Disp: 90 tablet, Rfl: 0    levothyroxine (Euthyrox) 125 mcg tablet, Take 1 tablet (125 mcg total) by mouth daily in the early morning, Disp: 90 tablet, Rfl: 1    losartan (COZAAR) 100 MG tablet, Take 1 tablet (100 mg total) by mouth daily, Disp: 90 tablet, Rfl: 1    lurasidone (LATUDA) 40 mg tablet, , Disp: , Rfl:     metFORMIN (GLUCOPHAGE-XR) 750 mg 24 hr tablet, Take 1 tablet (750 mg total) by mouth daily with breakfast, Disp: 90 tablet, Rfl: 1    methylPREDNISolone 4 " MG tablet therapy pack, Use as directed on package, Disp: 1 each, Rfl: 0    naltrexone (REVIA) 50 mg tablet, TAKE 1 TABLET BY MOUTH DAILY, Disp: 90 tablet, Rfl: 1    nitrofurantoin (MACROBID) 100 mg capsule, , Disp: , Rfl:     OXcarbazepine (TRILEPTAL) 300 mg tablet, Take 300 mg by mouth every 12 (twelve) hours, Disp: , Rfl:     oxybutynin (DITROPAN) 5 mg tablet, Take 1 tablet (5 mg total) by mouth 2 (two) times a day, Disp: 120 tablet, Rfl: 1    paliperidone palmitate ER (INVEGA) 234 mg/1.5 mL IM injection, 1.5 mL (234 mg total) by IM- Deltoid route every 4 weeks Do not start before February 28, 2024., Disp: , Rfl:     pantoprazole (PROTONIX) 40 mg tablet, Take 1 tablet (40 mg total) by mouth daily in the early morning, Disp: 90 tablet, Rfl: 1    pilocarpine (SALAGEN) 5 mg tablet, Take 1 tablet (5 mg total) by mouth 3 (three) times a day, Disp: 270 tablet, Rfl: 0    traZODone (DESYREL) 100 mg tablet, , Disp: , Rfl:     trospium chloride (SANCTURA) 20 mg tablet, Take 20 mg by mouth 2 (two) times a day, Disp: , Rfl:     warfarin (COUMADIN) 2 mg tablet, take 3 & 1/2 tablets (7MG) once a day Saturday and Sunday and take 2 & 1/2 tablets (5MG) once a day Monday through Friday, Disp: 90 tablet, Rfl: 0    warfarin (COUMADIN) 5 mg tablet, Take 1 tablet (5 mg total) by mouth daily, Disp: 90 tablet, Rfl: 1    atoMOXetine (STRATTERA) 40 mg capsule, Take 1 capsule (40 mg total) by mouth daily (Patient not taking: Reported on 6/4/2024), Disp: 30 capsule, Rfl: 1    buPROPion (FORFIVO XL) 450 MG 24 hr tablet, Take 1 tablet (450 mg total) by mouth daily (Patient not taking: Reported on 6/4/2024), Disp: 30 tablet, Rfl: 1    cyanocobalamin (VITAMIN B-12) 1000 MCG tablet, Take 1 tablet (1,000 mcg total) by mouth daily (Patient not taking: Reported on 6/5/2024), Disp: 90 tablet, Rfl: 1    enoxaparin (LOVENOX) 150 mg/mL injection, Inject 0.9 mL (135 mg total) under the skin every 12 (twelve) hours for 10 days Start with your coumadin  "post surgery as discussed, Disp: 20 mL, Rfl: 0    lidocaine (Lidoderm) 5 %, Apply 1 patch topically over 12 hours daily Remove & Discard patch within 12 hours or as directed by MD (Patient not taking: Reported on 6/4/2024), Disp: 30 patch, Rfl: 0    naproxen (Naprosyn) 500 mg tablet, Take 1 tablet (500 mg total) by mouth every 12 (twelve) hours as needed (pain) for up to 10 days Take with food., Disp: 10 tablet, Rfl: 0    OXcarbazepine (TRILEPTAL) 150 mg tablet, Take 3 tablets (450 mg total) by mouth every 12 (twelve) hours (Patient not taking: Reported on 6/4/2024), Disp: 180 tablet, Rfl: 1    topiramate (TOPAMAX) 200 MG tablet, Take 1 tablet (200 mg total) by mouth 2 (two) times a day, Disp: 60 tablet, Rfl: 1    Past Medical History:       Past Medical History:   Diagnosis Date    Acute deep vein thrombosis of lower leg, left ([facility]) 10/16/2023    Acute heart failure, unspecified heart failure type ([facility]) 05/06/2024    Anxiety     Arthritis     oa cassandra knees    Asthma     good control- no medications    Yan's esophagus     Bipolar affective disorder ([facility])     Cannabis abuse with unspecified 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[facility])     Chronic pain disorder     lumbar    Contusion of elbow 12/21/2021    COPD (chronic obstructive pulmonary disease) ([facility])     CPAP (continuous positive airway pressure) dependence     DDD (degenerative disc disease), lumbar 04/09/2015    Degenerative disc disease at L5-S1 level     Deliberate self-cutting     9/15/22per pt has not done recently-- does see a therapist and psychiatrist regularly    Depression 09/16/2008    Diarrhea 01/06/2022    Disease of thyroid gland     hypo    MARTINEZ (dyspnea on exertion)     per pt \"has had this with exertion and not new\"    Drug overdose 10/28/2008    suicide attempt and again drug overdose 7/2022-- [facility] floor and than transferred to [facility]    Dysphagia     Dyspnea     Ecchymosis 01/06/2022    Edema     BLE    Elevated troponin 09/02/2023    " Family history of blood clots     GERD (gastroesophageal reflux disease)     Grief 11/11/2023    Headache(784.0)     Headache, tension-type     Hemorrhage of rectum and anus 10/02/2017    Formatting of this note might be different from the original.  Added automatically from request for surgery [id number]    High blood pressure     History of COVID-19 12/30/2020    Symptoms started on 12/30/2020 and then got worse.  Today she is feeling a little bit better.  She is a candidate for monoclonal antibody infusion and set for 1/6/21 @ 1pm at [facility]. 01/07/21 - telemedicine -     Hyperlipidemia     Hypertension     Intentional overdose (HCC) 09/27/2023    Intentional self-harm by unspecified sharp object, sequela (HCC) 02/09/2023    Knee pain, bilateral     Especially right    Knee pain, right 02/23/2022    Medial epicondylitis of elbow, left 04/03/2018    Formatting of this note might be different from the original.  Added automatically from request for surgery [id number]    Medial epicondylitis of right elbow 07/17/2023    Medical marijuana use     Memory difficulties 08/06/2020    Memory loss     Migraines     Obesity     Other psychoactive substance abuse with unspecified psychoactive substance-induced disorder (HCC) 05/06/2024    Overactive bladder     Polysubstance abuse (HCC) 09/29/2023    Potential for cognitive impairment 06/17/2021    Primary osteoarthritis of both knees 08/08/2018    Pulmonary emboli (HCC)     Restrictive lung disease 02/01/2017    Last Assessment & Plan:   Formatting of this note might be different from the original.  I reviewed this problem throughout the rest of the note. Please refer to the other assessments for details.    Sjogren's disease (HCC)     Sleep apnea     Stress incontinence     Suicidal deliberate poisoning (HCC) 07/13/2022    Suicidal ideations     Teeth missing     Toxic encephalopathy 11/08/2023    Tremor     per pt Tremors of Right Leg and both Arms    Visual impairment      Wears glasses         Past Surgical History:   Procedure Laterality Date    BREAST CYST EXCISION Right 1989    CARPAL TUNNEL RELEASE Left     CHOLECYSTECTOMY  05/2003    Laparoscopic    COLONOSCOPY      01/12/2009    DILATION AND CURETTAGE OF UTERUS      ELBOW SURGERY Left     x2. No hardware    ESOPHAGOGASTRODUODENOSCOPY      FOOT SURGERY Left     Plantar fasciotomy    HERNIA REPAIR      HYSTERECTOMY      laporoscopic, partial    KNEE ARTHROSCOPY Bilateral     OOPHORECTOMY Left 10/2015    MA GASTROCNEMIUS RECESSION Left 02/24/2021    Procedure: RECESSION GASTROC OPEN;  Surgeon: Nomi Yang DPM;  Location: [facility] MAIN OR;  Service: Podiatry    MA RPR UMBILICAL HRNA 5 YRS/> REDUCIBLE N/A 04/24/2019    Procedure: REPAIR HERNIA UMBILICAL LAPAROSCOPIC W/ ROBOTICS;  Surgeon: Anahi Colon MD;  Location: [facility] Main [facility];  Service: General    MA SPHINCTEROTOMY ANAL DIVISION SPHINCTER SPX N/A 08/31/2018    Procedure: EUA, LEFT PARTIAL INTERNAL SPHINCTEROTOMY;  Surgeon: Tien Reyes MD;  Location:  MAIN OR;  Service: Colorectal    MA TNOT ELBOW LATERAL/MEDIAL DEBRIDE OPEN TDN RPR Left 09/20/2022    Procedure: RELEASE EPICONDYLAR ELBOW MEDIAL;  Surgeon: Kyree Winkler MD;  Location: [facility];  Service: Orthopedics    REDUCTION MAMMAPLASTY Bilateral 1999    REPAIR LACERATION Left     left hand-5/18/2009    REPLACEMENT TOTAL KNEE Right     ROTATOR CUFF REPAIR Left     TONSILECTOMY AND ADNOIDECTOMY      US GUIDED MSK PROCEDURE  04/22/2021    VASCULAR SURGERY      VEIN LIGATION Bilateral     WISDOM TOOTH EXTRACTION          Family History   Problem Relation Age of Onset    Kidney cancer Mother     Diabetes Mother     Depression Mother     Stroke Mother     Arthritis Mother     Cancer Mother     Psychiatric Illness Mother     No Known Problems Father     No Known Problems Sister     No Known Problems Maternal Grandmother     No Known Problems Maternal Grandfather     No Known Problems Paternal Grandmother     No  Known Problems Paternal Grandfather     Colon cancer Maternal Uncle     Colon cancer Maternal Uncle     Colon cancer Paternal Uncle     Colon cancer Family     Alcohol abuse Sister     Asthma Sister         Social History     Socioeconomic History    Marital status: Single     Spouse name: Not on file    Number of children: Not on file    Years of education: Not on file    Highest education level: Not on file   Occupational History    Not on file   Tobacco Use    Smoking status: Former     Current packs/day: 0.00     Average packs/day: 2.0 packs/day for 33.0 years (66.0 ttl pk-yrs)     Types: Cigarettes     Start date: 1985     Quit date: 2018     Years since quittin.5    Smokeless tobacco: Never    Tobacco comments:     Started at age 16.   Vaping Use    Vaping status: Never Used   Substance and Sexual Activity    Alcohol use: Not Currently     Comment: Recovering alcoholic    Drug use: Yes     Types: Marijuana     Comment: Former meth use, medicinal marijuana    Sexual activity: Not Currently     Partners: Male     Comment: defer   Other Topics Concern    Not on file   Social History Narrative    Not on file     Social Determinants of Health     Financial Resource Strain: Low Risk  (2024)    Overall Financial Resource Strain (CARDIA)     Difficulty of Paying Living Expenses: Not hard at all   Food Insecurity: No Food Insecurity (2024)    Hunger Vital Sign     Worried About Running Out of Food in the Last Year: Never true     Ran Out of Food in the Last Year: Never true   Transportation Needs: No Transportation Needs (2024)    PRAPARE - Transportation     Lack of Transportation (Medical): No     Lack of Transportation (Non-Medical): No   Physical Activity: Not on file   Stress: Not on file   Social Connections: Not on file   Intimate Partner Violence: Not on file   Housing Stability: Unknown (2024)    Housing Stability Vital Sign     Unable to Pay for Housing in the Last Year: No      "Number of Times Moved in the Last Year: Not on file     Homeless in the Last Year: No        Physical Exam:     /90 (BP Location: Right arm, Patient Position: Sitting, Cuff Size: Large)   Pulse 88   Temp (!) 97.3 °F (36.3 °C)   Ht 5' 6\" (1.676 m)   Wt (!) 139 kg (306 lb 9.6 oz)   SpO2 96%   BMI 49.49 kg/m²     Physical Exam  Vitals and nursing note reviewed.   Constitutional:       Appearance: Normal appearance. She is normal weight.   HENT:      Head: Normocephalic.      Right Ear: Tympanic membrane, ear canal and external ear normal. There is no impacted cerumen.      Left Ear: Tympanic membrane, ear canal and external ear normal. There is no impacted cerumen.      Nose: Nose normal.      Mouth/Throat:      Mouth: Mucous membranes are moist.      Pharynx: Oropharynx is clear.   Eyes:      Extraocular Movements: Extraocular movements intact.      Conjunctiva/sclera: Conjunctivae normal.      Pupils: Pupils are equal, round, and reactive to light.   Cardiovascular:      Rate and Rhythm: Normal rate and regular rhythm.      Pulses: Normal pulses.      Heart sounds: Normal heart sounds.   Pulmonary:      Effort: Pulmonary effort is normal.      Breath sounds: Normal breath sounds.   Abdominal:      General: Bowel sounds are normal. There is no distension.      Palpations: Abdomen is soft.      Tenderness: There is no abdominal tenderness.   Musculoskeletal:         General: Normal range of motion.      Cervical back: Normal range of motion.   Skin:     General: Skin is warm.      Capillary Refill: Capillary refill takes less than 2 seconds.   Neurological:      General: No focal deficit present.      Mental Status: She is alert and oriented to person, place, and time. Mental status is at baseline.   Psychiatric:         Mood and Affect: Mood normal.         Behavior: Behavior normal.         Thought Content: Thought content normal.         Judgment: Judgment normal.          Data:     Pre-operative " work-up    Laboratory Results: I have personally reviewed the pertinent laboratory results/reports      EKG: I have personally reviewed pertinent reports.      Chest x-ray:  N/A      Previous cardiopulmonary studies within the past year:  Echocardiogram: n/a  Cardiac Catheterization: n/a  Stress Test: n/a  Pulmonary Function Testing: n/a      Assessment & Recommendations:     1. Preop examination        2. Bunion, right foot        3. Hammer toe of right foot        4. Benign essential hypertension            Pre-Op Evaluation Assessment  53 y.o. female with planned surgery: Bunion repair with lapidus bunionectomy, possible skin ostomy, second metatarsal osteotomy with plantar repair, second hammertoe repair.    Known risk factors for perioperative complications: Coronary disease.        Current medications which may produce withdrawal symptoms if withheld perioperatively: none.    Pre-Op Evaluation Plan  1. Further preoperative workup as follows:   - None; no further preoperative work-up is required    2. Medication Management/Recommendations:   - None, continue medication regimen including morning of surgery, with sip of water  - Patient has been instructed to avoid herbs or non-directed vitamins the week prior to surgery to ensure no drug interactions with perioperative surgical and anesthetic medications.  - Patient should continue antihypertensive medications up through and including the day of surgery.   - Patient should hold diuretic medication the day of surgery.  - Hold metformin the morning of surgery and do not resume until 48 hours AFTER surgery to avoid risk of lactic acidosis. Do not resume if eGFR is < 30  - Patient has been instructed to avoid aspirin containing medications or non-steroidal anti-inflammatory drugs for the week preceding surgery.    3. Prophylaxis for cardiac events with perioperative beta-blockers: not indicated.    4. Patient requires further consultation with:  None    Clearance  Patient is CLEARED for surgery without any additional cardiac testing.     JHONATAN Armando  [place] [id number] [place] [address]  Phone#  [phone]  Fax#  [phone]

## 2024-07-29 ENCOUNTER — EVALUATION (OUTPATIENT)
Dept: PHYSICAL THERAPY | Age: 54
End: 2024-07-29
Payer: MEDICARE

## 2024-07-29 DIAGNOSIS — M75.32 CALCIFIC TENDONITIS OF LEFT SHOULDER: Primary | ICD-10-CM

## 2024-07-29 DIAGNOSIS — M35.00 SJOGREN'S SYNDROME, WITH UNSPECIFIED ORGAN INVOLVEMENT (HCC): ICD-10-CM

## 2024-07-29 PROCEDURE — 97110 THERAPEUTIC EXERCISES: CPT | Performed by: PHYSICAL THERAPIST

## 2024-07-29 PROCEDURE — 97162 PT EVAL MOD COMPLEX 30 MIN: CPT | Performed by: PHYSICAL THERAPIST

## 2024-07-29 NOTE — PROGRESS NOTES
PT Evaluation     Today's date: 2024  Patient name: Blanca Mason  : 1970  MRN: [id number]  Referring provider: Ann Marie Ge DO  Dx:   Encounter Diagnosis     ICD-10-CM    1. Calcific tendonitis of left shoulder  M75.32 Ambulatory Referral to Physical Therapy     PT plan of care cert/re-cert          Start Time: 08  Stop Time: 0845  Total time in clinic (min): 40 minutes    Assessment  Impairments: abnormal or restricted ROM, activity intolerance, impaired physical strength, lacks appropriate home exercise program, pain with function and scapular dyskinesis  Functional limitations: reaching, lifting, overhead activity, use of L UE    Assessment details: Blanca Mason is a 53 y.o. year old female who presents to IE with:   Calcific tendonitis of left shoulder  (primary encounter diagnosis)    Blanca presents with the impairments as listed above consistent with tendonitis diagnosis. Patient demonstrating increased pain with AROM of L shoulder, less pain with PROM performed by PT. Patient demonstrating weakness throughout rotator cuff, focus of current treatment plan will be on gentle ROM, pain reduction, and strengthening. Patient would benefit from Physical Therapy to address these impairments and to maximize function.   Barriers to therapy: Chronic pain, upcoming foot surgery on 24,  history of DVT, history of PE, sjogren's,   Understanding of Dx/Px/POC: good     Prognosis: good    Goals  Short-term goals:   1. Patient's pain will be decreased to 4/10 within 4 weeks  2. Patient's strength will increase to 3+/5 within 4 weeks     Long-term goals:   1. Patient will be able to perform lift L UE with minimal to no pain at time of discharge  2. Patient will be able to perform perform IADL with minimal to no pain at time of discharge  3. Patient will be independent in HEP at time of discharge.        Plan  Patient would benefit from: skilled PT and PT eval  Planned modality interventions:  "electrical stimulation/Russian stimulation, cryotherapy and unattended electrical stimulation    Planned therapy interventions: home exercise program, manual therapy, patient education, postural training, strengthening, stretching, therapeutic activities, therapeutic exercise, joint mobilization, IADL retraining and ADL training    Frequency: 2x week  Plan of Care beginning date: 2024  Plan of Care expiration date: 2024  Treatment plan discussed with: patient and PTA  Plan details: Thank you for referring Blanca Mason to Physical Therapy at [facility] and for the opportunity to coordinate care.          Subjective Evaluation    History of Present Illness  Mechanism of injury: Blanca presents to IE with L shoulder pain.   Symptoms began: \"about a month ago.\"   DINO: No specific DINO  Pain at rest: 6/10  Pain at best: 6/10  Pain at worst: 8-9/10  Numbness/ tingling: no   Activities that increase pain: driving, reaching behind back, reaching up, lifting, overhead activity.   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 Patient is currently not working due to upcoming surgery.   Goals: \"I want to be able to move it.\"   Patient is RHD. Patient is to have R foot surgery later this week to \"fix a tear in there and a hammer toe.\"     Quality of life: fair    Patient Goals  Patient goals for therapy: decreased pain, increased motion, increased strength and independence with ADLs/IADLs    Pain  Current pain ratin  At best pain ratin  At worst pain ratin  Location: L shoulder  Quality: pressure and sharp  Relieving factors: ice  Aggravating factors: overhead activity and lifting  Progression: no change    Treatments  Current treatment: physical therapy        Objective     Postural Observations  Seated posture: fair  Standing posture: fair    Additional Postural Observation Details  Patient " demonstrates increased thoracic kyphosis, cervical protrusion, and rounded shoulders     Palpation   Left   No palpable tenderness to the biceps.   Tenderness of the biceps, middle deltoid and upper trapezius.     Tenderness     Left Shoulder   Tenderness in the biceps tendon (proximal), bicipital groove and coracoid process.     Right Shoulder  No tenderness     Cervical/Thoracic Screen   Cervical range of motion within normal limits  Thoracic range of motion within normal limits    Neurological Testing     Sensation     Shoulder   Left Shoulder   Intact: light touch    Right Shoulder   Intact: Light touch    Active Range of Motion   Left Shoulder   Flexion: 80 degrees with pain  Abduction: 60 degrees with pain  External rotation BTH: Active external rotation behind the head: Can place hand on ear.   Internal rotation BTB: Active internal rotation behind the back: Can place hand on iliac crest.     Right Shoulder   Flexion: 160 degrees   Abduction: 160 degrees   External rotation BTH: C7   Internal rotation BTB: L1     Passive Range of Motion   Left Shoulder   Flexion: 100 degrees with pain  Abduction: 90 degrees with pain  External rotation 0°: 50 degrees with pain  Internal rotation 0°: 40 degrees with pain    Right Shoulder   Flexion: WFL  Abduction: WFL  External rotation 90°: WFL  Internal rotation 90°: WFL    Strength/Myotome Testing     Left Shoulder     Planes of Motion   Flexion: 2+   Abduction: 2+   External rotation at 0°: 2+   Internal rotation at 0°: 3     Right Shoulder     Planes of Motion   Flexion: 4-   Abduction: 4-   External rotation at 0°: 4-   Internal rotation at 0°: 4-     Tests     Left Shoulder   Positive empty can, Hawkin's, Neer's and painful arc.            7/29   2.    3.    4.    5.    6.      7.    8.    9.    10.    11.    12.      13.    14.    15.    16.  17.  18.      19.    20.   21.   22.   23.   24.      25.    26. 27. 28. 29. 30.   31. 32.  33. 34. 35. 36.        Daily  Treatment Diary     Manuals 7/29            PROM L shoulder                                       Therapeutic Exercise             Pulleys              Table slides- flex/scap :03x 10 flex            Scapular retraction 10x            Doorway pec stretch             Cane AAROM flexion/ ER                                        Shoulder isometrics             Sidelying ER             Sidelying abduction             Sidelying flexion             TB rows             TB LPD                                                                              Modalities             CP PRN

## 2024-07-30 ENCOUNTER — HOSPITAL ENCOUNTER (OUTPATIENT)
Dept: RADIOLOGY | Facility: CLINIC | Age: 54
Discharge: HOME | End: 2024-07-30
Payer: MEDICARE

## 2024-07-30 ENCOUNTER — TELEPHONE (OUTPATIENT)
Dept: PAIN MEDICINE | Facility: CLINIC | Age: 54
End: 2024-07-30

## 2024-07-30 VITALS
SYSTOLIC BLOOD PRESSURE: 187 MMHG | RESPIRATION RATE: 18 BRPM | DIASTOLIC BLOOD PRESSURE: 90 MMHG | OXYGEN SATURATION: 98 % | TEMPERATURE: 97.5 F | HEART RATE: 76 BPM

## 2024-07-30 DIAGNOSIS — M47.812 CERVICAL SPONDYLOSIS: ICD-10-CM

## 2024-07-30 PROCEDURE — 64634 DESTROY C/TH FACET JNT ADDL: CPT | Performed by: ANESTHESIOLOGY

## 2024-07-30 PROCEDURE — 64633 DESTROY CERV/THOR FACET JNT: CPT | Performed by: ANESTHESIOLOGY

## 2024-07-30 RX ORDER — BUPIVACAINE HYDROCHLORIDE 5 MG/ML
3 INJECTION, SOLUTION EPIDURAL; INTRACAUDAL ONCE
Status: COMPLETED | OUTPATIENT
Start: 2024-07-30 | End: 2024-07-30

## 2024-07-30 RX ORDER — LIDOCAINE HYDROCHLORIDE 10 MG/ML
7 INJECTION, SOLUTION EPIDURAL; INFILTRATION; INTRACAUDAL; PERINEURAL ONCE
Status: COMPLETED | OUTPATIENT
Start: 2024-07-30 | End: 2024-07-30

## 2024-07-30 RX ADMIN — BUPIVACAINE HYDROCHLORIDE 3 ML: 5 INJECTION, SOLUTION EPIDURAL; INTRACAUDAL; PERINEURAL at 09:36

## 2024-07-30 RX ADMIN — LIDOCAINE HYDROCHLORIDE 3 ML: 20 INJECTION, SOLUTION EPIDURAL; INFILTRATION; INTRACAUDAL; PERINEURAL at 09:36

## 2024-07-30 RX ADMIN — LIDOCAINE HYDROCHLORIDE 7 ML: 10 INJECTION, SOLUTION EPIDURAL; INFILTRATION; INTRACAUDAL; PERINEURAL at 09:30

## 2024-07-30 NOTE — DISCHARGE INSTRUCTIONS

## 2024-07-30 NOTE — H&P
"History of Present Illness: The patient is a 53 y.o. female who presents with complaints of neck pain.    Past Medical History:   Diagnosis Date    Acute deep vein thrombosis of lower leg, left (HCC) 10/16/2023    Acute heart failure, unspecified heart failure type (HCC) 05/06/2024    Anxiety     Arthritis     oa cassandra knees    Asthma     good control- no medications    Yan's esophagus     Bipolar affective disorder (HCC)     Cannabis abuse with unspecified 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[facility])     Chronic pain disorder     lumbar    Contusion of elbow 12/21/2021    COPD (chronic obstructive pulmonary disease) (HCC)     CPAP (continuous positive airway pressure) dependence     DDD (degenerative disc disease), lumbar 04/09/2015    Degenerative disc disease at L5-S1 level     Deliberate self-cutting     9/15/22per pt has not done recently-- does see a therapist and psychiatrist regularly    Depression 09/16/2008    Diarrhea 01/06/2022    Disease of thyroid gland     hypo    MARTINEZ (dyspnea on exertion)     per pt \"has had this with exertion and not new\"    Drug overdose 10/28/2008    suicide attempt and again drug overdose 7/2022--[facility]Medical floor and than transferred to [facility]    Dysphagia     Dyspnea     Ecchymosis 01/06/2022    Edema     BLE    Elevated troponin 09/02/2023    Family history of blood clots     GERD (gastroesophageal reflux disease)     Grief 11/11/2023    Headache(784.0)     Headache, tension-type     Hemorrhage of rectum and anus 10/02/2017    Formatting of this note might be different from the original.  Added automatically from request for surgery [id number]    High blood pressure     History of COVID-19 12/30/2020    Symptoms started on 12/30/2020 and then got worse.  Today she is feeling a little bit better.  She is a candidate for monoclonal antibody infusion and set for 1/6/21 @ 1pm at [facility]. 01/07/21 - telemedicine -     Hyperlipidemia     Hypertension     Intentional overdose (HCC) " 09/27/2023    Intentional self-harm by unspecified sharp object, sequela (HCC) 02/09/2023    Knee pain, bilateral     Especially right    Knee pain, right 02/23/2022    Medial epicondylitis of elbow, left 04/03/2018    Formatting of this note might be different from the original.  Added automatically from request for surgery [id number]    Medial epicondylitis of right elbow 07/17/2023    Medical marijuana use     Memory difficulties 08/06/2020    Memory loss     Migraines     Obesity     Other psychoactive substance abuse with unspecified psychoactive substance-induced disorder (HCC) 05/06/2024    Overactive bladder     Polysubstance abuse ([facility]) 09/29/2023    Potential for cognitive impairment 06/17/2021    Primary osteoarthritis of both knees 08/08/2018    Pulmonary emboli ([facility])     Restrictive lung disease 02/01/2017    Last Assessment & Plan:   Formatting of this note might be different from the original.  I reviewed this problem throughout the rest of the note. Please refer to the other assessments for details.    Sjogren's disease ([facility])     Sleep apnea     Stress incontinence     Suicidal deliberate poisoning ([facility]) 07/13/2022    Suicidal ideations     Teeth missing     Toxic encephalopathy 11/08/2023    Tremor     per pt Tremors of Right Leg and both Arms    Visual impairment     Wears glasses        Past Surgical History:   Procedure Laterality Date    BREAST CYST EXCISION Right 1989    CARPAL TUNNEL RELEASE Left     CHOLECYSTECTOMY  05/2003    Laparoscopic    COLONOSCOPY      01/12/2009    DILATION AND CURETTAGE OF UTERUS      ELBOW SURGERY Left     x2. No hardware    ESOPHAGOGASTRODUODENOSCOPY      FOOT SURGERY Left     Plantar fasciotomy    HERNIA REPAIR      HYSTERECTOMY      laporoscopic, partial    KNEE ARTHROSCOPY Bilateral     OOPHORECTOMY Left 10/2015    DC GASTROCNEMIUS RECESSION Left 02/24/2021    Procedure: RECESSION GASTROC OPEN;  Surgeon: Nomi Yang DPM;  Location:  MAIN OR;  Service:  Podiatry    IA RPR UMBILICAL HRNA 5 YRS/> REDUCIBLE N/A 04/24/2019    Procedure: REPAIR HERNIA UMBILICAL LAPAROSCOPIC W/ ROBOTICS;  Surgeon: Anahi Colon MD;  Location: [facility] Main OR;  Service: General    IA SPHINCTEROTOMY ANAL DIVISION SPHINCTER SPX N/A 08/31/2018    Procedure: EUA, LEFT PARTIAL INTERNAL SPHINCTEROTOMY;  Surgeon: Tien Reyes MD;  Location: [facility] MAIN OR;  Service: Colorectal    IA TNOT ELBOW LATERAL/MEDIAL DEBRIDE OPEN TDN RPR Left 09/20/2022    Procedure: RELEASE EPICONDYLAR ELBOW MEDIAL;  Surgeon: Kyree Winkler MD;  [facility];  Service: Orthopedics    REDUCTION MAMMAPLASTY Bilateral 1999    REPAIR LACERATION Left     left hand-5/18/2009    REPLACEMENT TOTAL KNEE Right     ROTATOR CUFF REPAIR Left     TONSILECTOMY AND ADNOIDECTOMY      US GUIDED MSK PROCEDURE  04/22/2021    VASCULAR SURGERY      VEIN LIGATION Bilateral     WISDOM TOOTH EXTRACTION           Current Outpatient Medications:     atoMOXetine (STRATTERA) 40 mg capsule, Take 1 capsule (40 mg total) by mouth daily (Patient not taking: Reported on 6/4/2024), Disp: 30 capsule, Rfl: 1    atoMOXetine (STRATTERA) 60 mg capsule, Take 60 mg by mouth daily, Disp: , Rfl:     Austedo XR 24 MG TB24, , Disp: , Rfl:     benztropine (COGENTIN) 1 mg tablet, , Disp: , Rfl:     buPROPion (FORFIVO XL) 450 MG 24 hr tablet, Take 1 tablet (450 mg total) by mouth daily (Patient not taking: Reported on 6/4/2024), Disp: 30 tablet, Rfl: 1    buPROPion (WELLBUTRIN XL) 150 mg 24 hr tablet, , Disp: , Rfl:     Cholecalciferol (Vitamin D3) 125 MCG (5000 UT) capsule, Take 1 capsule (5,000 Units total) by mouth daily, Disp: 90 capsule, Rfl: 1    cloNIDine (CATAPRES) 0.2 mg tablet, TAKE 1 TABLET (0.2 MG TOTAL) BY MOUTH EVERY 12 (TWELVE) HOURS, Disp: 180 tablet, Rfl: 1    cyanocobalamin (VITAMIN B-12) 1000 MCG tablet, Take 1 tablet (1,000 mcg total) by mouth daily (Patient not taking: Reported on 6/5/2024), Disp: 90 tablet, Rfl: 1    enoxaparin (LOVENOX)  150 mg/mL injection, Inject 0.9 mL (135 mg total) under the skin every 12 (twelve) hours for 10 days Start with your coumadin post surgery as discussed, Disp: 20 mL, Rfl: 0    furosemide (LASIX) 20 mg tablet, Take 1 tablet (20 mg total) by mouth daily, Disp: 90 tablet, Rfl: 1    levocetirizine (XYZAL) 5 MG tablet, Take 1 tablet (5 mg total) by mouth every evening, Disp: 90 tablet, Rfl: 0    levothyroxine (Euthyrox) 125 mcg tablet, Take 1 tablet (125 mcg total) by mouth daily in the early morning, Disp: 90 tablet, Rfl: 1    lidocaine (Lidoderm) 5 %, Apply 1 patch topically over 12 hours daily Remove & Discard patch within 12 hours or as directed by MD (Patient not taking: Reported on 6/4/2024), Disp: 30 patch, Rfl: 0    losartan (COZAAR) 100 MG tablet, Take 1 tablet (100 mg total) by mouth daily, Disp: 90 tablet, Rfl: 1    lurasidone (LATUDA) 40 mg tablet, , Disp: , Rfl:     metFORMIN (GLUCOPHAGE-XR) 750 mg 24 hr tablet, Take 1 tablet (750 mg total) by mouth daily with breakfast, Disp: 90 tablet, Rfl: 1    methylPREDNISolone 4 MG tablet therapy pack, Use as directed on package, Disp: 1 each, Rfl: 0    naltrexone (REVIA) 50 mg tablet, TAKE 1 TABLET BY MOUTH DAILY, Disp: 90 tablet, Rfl: 1    naproxen (Naprosyn) 500 mg tablet, Take 1 tablet (500 mg total) by mouth every 12 (twelve) hours as needed (pain) for up to 10 days Take with food., Disp: 10 tablet, Rfl: 0    nitrofurantoin (MACROBID) 100 mg capsule, , Disp: , Rfl:     OXcarbazepine (TRILEPTAL) 150 mg tablet, Take 3 tablets (450 mg total) by mouth every 12 (twelve) hours (Patient not taking: Reported on 6/4/2024), Disp: 180 tablet, Rfl: 1    OXcarbazepine (TRILEPTAL) 300 mg tablet, Take 300 mg by mouth every 12 (twelve) hours, Disp: , Rfl:     oxybutynin (DITROPAN) 5 mg tablet, Take 1 tablet (5 mg total) by mouth 2 (two) times a day, Disp: 120 tablet, Rfl: 1    paliperidone palmitate ER (INVEGA) 234 mg/1.5 mL IM injection, 1.5 mL (234 mg total) by IM- Deltoid  "route every 4 weeks Do not start before February 28, 2024., Disp: , Rfl:     pantoprazole (PROTONIX) 40 mg tablet, Take 1 tablet (40 mg total) by mouth daily in the early morning, Disp: 90 tablet, Rfl: 1    pilocarpine (SALAGEN) 5 mg tablet, Take 1 tablet (5 mg total) by mouth 3 (three) times a day, Disp: 270 tablet, Rfl: 0    topiramate (TOPAMAX) 200 MG tablet, Take 1 tablet (200 mg total) by mouth 2 (two) times a day, Disp: 60 tablet, Rfl: 1    traZODone (DESYREL) 100 mg tablet, , Disp: , Rfl:     trospium chloride (SANCTURA) 20 mg tablet, Take 20 mg by mouth 2 (two) times a day, Disp: , Rfl:     warfarin (COUMADIN) 2 mg tablet, take 3 & 1/2 tablets (7MG) once a day Saturday and Sunday and take 2 & 1/2 tablets (5MG) once a day Monday through Friday, Disp: 90 tablet, Rfl: 0    warfarin (COUMADIN) 5 mg tablet, Take 1 tablet (5 mg total) by mouth daily, Disp: 90 tablet, Rfl: 1    Allergies   Allergen Reactions    Percocet [Oxycodone-Acetaminophen] Headache     Severe headaches   (denies issues with Tylenol)    Povidone Iodine Rash     Unsure if betadine or gauze post surgical. Got rash on leg.   Has  Had itchy rashes after every surgery prep and IV insertion    Chlorhexidine Rash     Per pt \"able to use the liquid soap--thinkd reaction from the sponges or wipes\"       Physical Exam:   Vitals:    07/30/24 0902   BP: 153/96   Pulse: 75   Resp: 18   Temp: 97.5 °F (36.4 °C)   SpO2: 95%     General: Awake, Alert, Oriented x 3, Mood and affect appropriate  Respiratory: Respirations even and unlabored  Cardiovascular: Peripheral pulses intact; no edema  Musculoskeletal Exam: normal gait    ASA Score: 3         Assessment:   1. Cervical spondylosis        Plan: LEFT C4-6 RFA    "

## 2024-07-31 ENCOUNTER — TELEPHONE (OUTPATIENT)
Age: 54
End: 2024-07-31

## 2024-07-31 ENCOUNTER — OFFICE VISIT (OUTPATIENT)
Dept: PHYSICAL THERAPY | Age: 54
End: 2024-07-31
Payer: MEDICARE

## 2024-07-31 DIAGNOSIS — M75.32 CALCIFIC TENDONITIS OF LEFT SHOULDER: Primary | ICD-10-CM

## 2024-07-31 PROCEDURE — 97140 MANUAL THERAPY 1/> REGIONS: CPT

## 2024-07-31 PROCEDURE — 97110 THERAPEUTIC EXERCISES: CPT

## 2024-07-31 PROCEDURE — 97035 APP MDLTY 1+ULTRASOUND EA 15: CPT

## 2024-07-31 PROCEDURE — 97032 APPL MODALITY 1+ESTIM EA 15: CPT

## 2024-07-31 PROCEDURE — 97033 APP MDLTY 1+IONTPHRSIS EA 15: CPT

## 2024-07-31 RX ORDER — PILOCARPINE HYDROCHLORIDE 5 MG/1
TABLET, FILM COATED ORAL
Qty: 270 TABLET | Refills: 1 | Status: SHIPPED | OUTPATIENT
Start: 2024-07-31

## 2024-07-31 NOTE — PROGRESS NOTES
"Daily Note     Today's date: 2024  Patient name: Blanca Mason  : 1970  MRN: [id number]  Referring provider: Ann Marie Ge DO  Dx:   Encounter Diagnosis     ICD-10-CM    1. Calcific tendonitis of left shoulder  M75.32                      Subjective: \"My pain is a 6 or 7 and in bed it's a 9 .\" Pt c/o left sh pain bicipital groove tenderness   Piyush RDemarco PT supervising units.  Pt reports insurance is changing tomorrow.       Objective: See treatment diary below      Assessment: Tolerated treatment well. Patient would benefit from continued PT      Plan: Continue per plan of care.            2.    3.    4.    5.    6.      7.    8.    9.    10.    11.    12.      13.    14.    15.    16.  17.  18.      19.    20.   21.   22.   23.   24.      25.    26. 27. 28. 29. 30.   31. 32.  33. 34. 35. 36.        Daily Treatment Diary     Manuals            PROM L shoulder  man           Vladimir taping left sh for relocation of head of left humerus  Man 15 min                         Therapeutic Exercise             Pulleys              Table slides- flex/scap :03x 10 flex 3 x 10 flex abd scap           Scapular retraction 10x 10 x            Doorway pec stretch             Cane AAROM flexion/ ER                                        Shoulder isometrics             Sidelying ER             Sidelying abduction             Sidelying flexion             TB rows             TB LPD                                                                              Modalities US 1.2 w/cm2 cont, zynex e stim IF  10 min us,  15 min estim IF while perf exer            CP PRN                    "

## 2024-07-31 NOTE — TELEPHONE ENCOUNTER
Returned call to Blanca. Informed her that on our end, the [facility] did confirm the script on 7/25. Pt stated when she called, she asked if they received her 'needles'. Instructed patient to ask if they have her prescription for Lovenox instead. Pt will be calling the pharmacy to confirm. Instructed pt to call back if they do not have it. Pt verbalized understanding.

## 2024-07-31 NOTE — TELEPHONE ENCOUNTER
S/w the patient and she stated she feels good. Took the band aids off last night and everything seems fine. Reviewed the postoperative instructions and she appreciated the call.    Can you please schedule a 6 week F/U? Thanks

## 2024-07-31 NOTE — TELEPHONE ENCOUNTER
Patient calling in to report that she called over to the pharmacy about the needles that needed to be called in for her injections for after surgery. The pharmacy stated they still had not received them.    Patient stated she would like this taken care of urgently as she has surgery on 8/2 and wants this taken care of today so she can pick them up. Please call her back at [phone]

## 2024-08-01 ENCOUNTER — OFFICE VISIT (OUTPATIENT)
Dept: PHYSICAL THERAPY | Age: 54
End: 2024-08-01
Payer: COMMERCIAL

## 2024-08-01 DIAGNOSIS — M75.32 CALCIFIC TENDONITIS OF LEFT SHOULDER: Primary | ICD-10-CM

## 2024-08-01 PROCEDURE — 97110 THERAPEUTIC EXERCISES: CPT

## 2024-08-01 PROCEDURE — 97014 ELECTRIC STIMULATION THERAPY: CPT

## 2024-08-01 PROCEDURE — 97035 APP MDLTY 1+ULTRASOUND EA 15: CPT

## 2024-08-01 PROCEDURE — 97112 NEUROMUSCULAR REEDUCATION: CPT

## 2024-08-01 NOTE — PROGRESS NOTES
"Daily Note     Today's date: 2024  Patient name: Blanca Mason  : 1970  MRN: [id number]  Referring provider: Ann Marie Ge DO  Dx:   Encounter Diagnosis     ICD-10-CM    1. Calcific tendonitis of left shoulder  M75.32                      Subjective: \"I did have relief until this morning when I pushed up to get out of bed.\"      Objective: See treatment diary below      Assessment: Tolerated treatment well. Patient would benefit from continued PT      Plan: Continue per plan of care.         2.    3.   8 4.    5.    6.      7.    8.    9.    10.    11.    12.      13.    14.    15.    16.  17.  18.      19.    20.   21.   22.   23.   24.      25.    26. 27. 28. 29. 30.   31. 32.  33. 34. 35. 36.        Daily Treatment Diary     Manuals           PROM L shoulder  man           Vladimir taping left sh for relocation of head of left humerus  Man 15 min  removed                       Therapeutic Exercise             Pulleys              Table slides- flex/scap :03x 10 flex 3 x 10 flex abd scap 3 x 10 flex , abd scap          Scapular retraction 10x 10 x  2 x 10          Doorway pec stretch             Cane AAROM flexion/ ER                                        Shoulder isometrics   Left sh 1 set of 10 hold 5 sec          Sidelying ER   10          Sidelying abduction             Sidelying flexion             TB rows             TB LPD                                                                              Modalities US 1.2 w/cm2 cont, zynex e stim IF  10 min us,  15 min estim IF while perf exer  10 min us,   15 min e stim           CP PRN                      "

## 2024-08-02 ENCOUNTER — ANESTHESIA EVENT (OUTPATIENT)
Dept: PERIOP | Facility: HOSPITAL | Age: 54
End: 2024-08-02
Payer: COMMERCIAL

## 2024-08-02 ENCOUNTER — TELEPHONE (OUTPATIENT)
Age: 54
End: 2024-08-02

## 2024-08-02 ENCOUNTER — HOSPITAL ENCOUNTER (OUTPATIENT)
Facility: HOSPITAL | Age: 54
Setting detail: OUTPATIENT SURGERY
Discharge: HOME | End: 2024-08-02
Attending: STUDENT IN AN ORGANIZED HEALTH CARE EDUCATION/TRAINING PROGRAM | Admitting: STUDENT IN AN ORGANIZED HEALTH CARE EDUCATION/TRAINING PROGRAM
Payer: COMMERCIAL

## 2024-08-02 ENCOUNTER — APPOINTMENT (OUTPATIENT)
Dept: RADIOLOGY | Facility: HOSPITAL | Age: 54
End: 2024-08-02
Payer: COMMERCIAL

## 2024-08-02 ENCOUNTER — ANESTHESIA (OUTPATIENT)
Dept: PERIOP | Facility: HOSPITAL | Age: 54
End: 2024-08-02
Payer: COMMERCIAL

## 2024-08-02 VITALS
BODY MASS INDEX: 47.09 KG/M2 | HEART RATE: 72 BPM | TEMPERATURE: 97.6 F | WEIGHT: 293 LBS | RESPIRATION RATE: 18 BRPM | OXYGEN SATURATION: 98 % | HEIGHT: 66 IN | SYSTOLIC BLOOD PRESSURE: 166 MMHG | DIASTOLIC BLOOD PRESSURE: 72 MMHG

## 2024-08-02 LAB — GLUCOSE SERPL-MCNC: 107 MG/DL (ref 65–140)

## 2024-08-02 PROCEDURE — C1713 ANCHOR/SCREW BN/BN,TIS/BN: HCPCS | Performed by: STUDENT IN AN ORGANIZED HEALTH CARE EDUCATION/TRAINING PROGRAM

## 2024-08-02 PROCEDURE — L8641 METATARSAL JOINT IMPLANT: HCPCS | Performed by: STUDENT IN AN ORGANIZED HEALTH CARE EDUCATION/TRAINING PROGRAM

## 2024-08-02 PROCEDURE — 82948 REAGENT STRIP/BLOOD GLUCOSE: CPT

## 2024-08-02 PROCEDURE — 73620 X-RAY EXAM OF FOOT: CPT

## 2024-08-02 PROCEDURE — C9290 INJ, BUPIVACAINE LIPOSOME: HCPCS | Performed by: STUDENT IN AN ORGANIZED HEALTH CARE EDUCATION/TRAINING PROGRAM

## 2024-08-02 PROCEDURE — C1769 GUIDE WIRE: HCPCS | Performed by: STUDENT IN AN ORGANIZED HEALTH CARE EDUCATION/TRAINING PROGRAM

## 2024-08-02 DEVICE — IMPLANTABLE DEVICE
Type: IMPLANTABLE DEVICE | Site: FOOT | Status: FUNCTIONAL
Brand: ORTHOLOC™ 2 LAPIFUSE™

## 2024-08-02 DEVICE — IMPLANTABLE DEVICE
Type: IMPLANTABLE DEVICE | Site: FOOT | Status: FUNCTIONAL
Brand: ORTHOLOC

## 2024-08-02 DEVICE — IMPLANTABLE DEVICE
Type: IMPLANTABLE DEVICE | Site: FOOT | Status: FUNCTIONAL
Brand: ORTHOLOC 3DI

## 2024-08-02 DEVICE — CANNULATED SCREW
Type: IMPLANTABLE DEVICE | Site: TOE | Status: FUNCTIONAL
Brand: ASNIS

## 2024-08-02 DEVICE — SCREW CANN 4 X 35MM LNG THRD: Type: IMPLANTABLE DEVICE | Site: FOOT | Status: FUNCTIONAL

## 2024-08-02 DEVICE — IMPLANTABLE DEVICE
Type: IMPLANTABLE DEVICE | Site: FOOT | Status: FUNCTIONAL
Brand: PHALINX

## 2024-08-02 DEVICE — IMPLANTABLE DEVICE
Type: IMPLANTABLE DEVICE | Site: FOOT | Status: FUNCTIONAL
Brand: GRAVITY

## 2024-08-02 RX ORDER — CEFAZOLIN SODIUM 1 G/50ML
1000 SOLUTION INTRAVENOUS ONCE
Status: COMPLETED | OUTPATIENT
Start: 2024-08-02 | End: 2024-08-02

## 2024-08-02 RX ORDER — BUPIVACAINE HYDROCHLORIDE 5 MG/ML
INJECTION, SOLUTION EPIDURAL; INTRACAUDAL
Status: DISCONTINUED | OUTPATIENT
Start: 2024-08-02 | End: 2024-08-02

## 2024-08-02 RX ORDER — PROPOFOL 10 MG/ML
INJECTION, EMULSION INTRAVENOUS AS NEEDED
Status: DISCONTINUED | OUTPATIENT
Start: 2024-08-02 | End: 2024-08-02

## 2024-08-02 RX ORDER — MAGNESIUM HYDROXIDE 1200 MG/15ML
LIQUID ORAL AS NEEDED
Status: DISCONTINUED | OUTPATIENT
Start: 2024-08-02 | End: 2024-08-02 | Stop reason: HOSPADM

## 2024-08-02 RX ORDER — HYDROMORPHONE HYDROCHLORIDE 2 MG/ML
INJECTION, SOLUTION INTRAMUSCULAR; INTRAVENOUS; SUBCUTANEOUS AS NEEDED
Status: DISCONTINUED | OUTPATIENT
Start: 2024-08-02 | End: 2024-08-02

## 2024-08-02 RX ORDER — MIDAZOLAM HYDROCHLORIDE 2 MG/2ML
INJECTION, SOLUTION INTRAMUSCULAR; INTRAVENOUS AS NEEDED
Status: DISCONTINUED | OUTPATIENT
Start: 2024-08-02 | End: 2024-08-02

## 2024-08-02 RX ORDER — SUCCINYLCHOLINE/SOD CL,ISO/PF 100 MG/5ML
SYRINGE (ML) INTRAVENOUS AS NEEDED
Status: DISCONTINUED | OUTPATIENT
Start: 2024-08-02 | End: 2024-08-02

## 2024-08-02 RX ORDER — ONDANSETRON 2 MG/ML
4 INJECTION INTRAMUSCULAR; INTRAVENOUS ONCE AS NEEDED
Status: DISCONTINUED | OUTPATIENT
Start: 2024-08-02 | End: 2024-08-02 | Stop reason: HOSPADM

## 2024-08-02 RX ORDER — FENTANYL CITRATE 50 UG/ML
INJECTION, SOLUTION INTRAMUSCULAR; INTRAVENOUS AS NEEDED
Status: DISCONTINUED | OUTPATIENT
Start: 2024-08-02 | End: 2024-08-02

## 2024-08-02 RX ORDER — LABETALOL HYDROCHLORIDE 5 MG/ML
INJECTION, SOLUTION INTRAVENOUS AS NEEDED
Status: DISCONTINUED | OUTPATIENT
Start: 2024-08-02 | End: 2024-08-02

## 2024-08-02 RX ORDER — ONDANSETRON 2 MG/ML
INJECTION INTRAMUSCULAR; INTRAVENOUS AS NEEDED
Status: DISCONTINUED | OUTPATIENT
Start: 2024-08-02 | End: 2024-08-02

## 2024-08-02 RX ORDER — LIDOCAINE HYDROCHLORIDE 10 MG/ML
INJECTION, SOLUTION EPIDURAL; INFILTRATION; INTRACAUDAL; PERINEURAL AS NEEDED
Status: DISCONTINUED | OUTPATIENT
Start: 2024-08-02 | End: 2024-08-02

## 2024-08-02 RX ORDER — HYDROMORPHONE HCL IN WATER/PF 6 MG/30 ML
0.2 PATIENT CONTROLLED ANALGESIA SYRINGE INTRAVENOUS
Status: DISCONTINUED | OUTPATIENT
Start: 2024-08-02 | End: 2024-08-02 | Stop reason: HOSPADM

## 2024-08-02 RX ORDER — KETAMINE HCL IN NACL, ISO-OSM 100MG/10ML
SYRINGE (ML) INJECTION AS NEEDED
Status: DISCONTINUED | OUTPATIENT
Start: 2024-08-02 | End: 2024-08-02

## 2024-08-02 RX ORDER — SODIUM CHLORIDE, SODIUM LACTATE, POTASSIUM CHLORIDE, CALCIUM CHLORIDE 600; 310; 30; 20 MG/100ML; MG/100ML; MG/100ML; MG/100ML
INJECTION, SOLUTION INTRAVENOUS CONTINUOUS PRN
Status: DISCONTINUED | OUTPATIENT
Start: 2024-08-02 | End: 2024-08-02

## 2024-08-02 RX ORDER — DEXAMETHASONE SODIUM PHOSPHATE 10 MG/ML
INJECTION, SOLUTION INTRAMUSCULAR; INTRAVENOUS AS NEEDED
Status: DISCONTINUED | OUTPATIENT
Start: 2024-08-02 | End: 2024-08-02

## 2024-08-02 RX ORDER — PROPOFOL 10 MG/ML
INJECTION, EMULSION INTRAVENOUS CONTINUOUS PRN
Status: DISCONTINUED | OUTPATIENT
Start: 2024-08-02 | End: 2024-08-02

## 2024-08-02 RX ORDER — CEFAZOLIN SODIUM 2 G/50ML
2000 SOLUTION INTRAVENOUS ONCE
Status: COMPLETED | OUTPATIENT
Start: 2024-08-02 | End: 2024-08-02

## 2024-08-02 RX ORDER — SODIUM CHLORIDE, SODIUM LACTATE, POTASSIUM CHLORIDE, CALCIUM CHLORIDE 600; 310; 30; 20 MG/100ML; MG/100ML; MG/100ML; MG/100ML
100 INJECTION, SOLUTION INTRAVENOUS CONTINUOUS
Status: DISCONTINUED | OUTPATIENT
Start: 2024-08-02 | End: 2024-08-02 | Stop reason: HOSPADM

## 2024-08-02 RX ORDER — ROCURONIUM BROMIDE 10 MG/ML
INJECTION, SOLUTION INTRAVENOUS AS NEEDED
Status: DISCONTINUED | OUTPATIENT
Start: 2024-08-02 | End: 2024-08-02

## 2024-08-02 RX ADMIN — PROPOFOL 200 MG: 10 INJECTION, EMULSION INTRAVENOUS at 11:33

## 2024-08-02 RX ADMIN — LIDOCAINE HYDROCHLORIDE 50 MG: 10 INJECTION, SOLUTION EPIDURAL; INFILTRATION; INTRACAUDAL; PERINEURAL at 11:33

## 2024-08-02 RX ADMIN — MIDAZOLAM HYDROCHLORIDE 2 MG: 1 INJECTION, SOLUTION INTRAMUSCULAR; INTRAVENOUS at 11:30

## 2024-08-02 RX ADMIN — SUGAMMADEX 400 MG: 100 INJECTION, SOLUTION INTRAVENOUS at 14:31

## 2024-08-02 RX ADMIN — BUPIVACAINE HYDROCHLORIDE 18 ML: 5 INJECTION, SOLUTION EPIDURAL; INTRACAUDAL; PERINEURAL at 15:15

## 2024-08-02 RX ADMIN — BUPIVACAINE 12 ML: 13.3 INJECTION, SUSPENSION, LIPOSOMAL INFILTRATION at 15:15

## 2024-08-02 RX ADMIN — LABETALOL HYDROCHLORIDE 10 MG: 5 INJECTION, SOLUTION INTRAVENOUS at 14:37

## 2024-08-02 RX ADMIN — HYDROMORPHONE HYDROCHLORIDE 0.5 MG: 2 INJECTION, SOLUTION INTRAMUSCULAR; INTRAVENOUS; SUBCUTANEOUS at 14:07

## 2024-08-02 RX ADMIN — BUPIVACAINE HYDROCHLORIDE 12 ML: 5 INJECTION, SOLUTION EPIDURAL; INTRACAUDAL at 15:15

## 2024-08-02 RX ADMIN — LABETALOL HYDROCHLORIDE 10 MG: 5 INJECTION, SOLUTION INTRAVENOUS at 11:53

## 2024-08-02 RX ADMIN — SODIUM CHLORIDE, SODIUM LACTATE, POTASSIUM CHLORIDE, AND CALCIUM CHLORIDE: .6; .31; .03; .02 INJECTION, SOLUTION INTRAVENOUS at 14:32

## 2024-08-02 RX ADMIN — ONDANSETRON 4 MG: 2 INJECTION INTRAMUSCULAR; INTRAVENOUS at 13:17

## 2024-08-02 RX ADMIN — HYDROMORPHONE HYDROCHLORIDE 0.5 MG: 2 INJECTION, SOLUTION INTRAMUSCULAR; INTRAVENOUS; SUBCUTANEOUS at 13:16

## 2024-08-02 RX ADMIN — PROPOFOL 50 MCG/KG/MIN: 10 INJECTION, EMULSION INTRAVENOUS at 11:35

## 2024-08-02 RX ADMIN — DEXAMETHASONE SODIUM PHOSPHATE 10 MG: 10 INJECTION, SOLUTION INTRAMUSCULAR; INTRAVENOUS at 11:45

## 2024-08-02 RX ADMIN — ROCURONIUM BROMIDE 50 MG: 50 INJECTION, SOLUTION INTRAVENOUS at 11:34

## 2024-08-02 RX ADMIN — FENTANYL CITRATE 100 MCG: 50 INJECTION, SOLUTION INTRAMUSCULAR; INTRAVENOUS at 11:33

## 2024-08-02 RX ADMIN — CEFAZOLIN SODIUM 2000 MG: 2 SOLUTION INTRAVENOUS at 11:26

## 2024-08-02 RX ADMIN — HYDROMORPHONE HYDROCHLORIDE 1 MG: 2 INJECTION, SOLUTION INTRAMUSCULAR; INTRAVENOUS; SUBCUTANEOUS at 11:52

## 2024-08-02 RX ADMIN — Medication 20 MG: at 12:13

## 2024-08-02 RX ADMIN — Medication 30 MG: at 11:50

## 2024-08-02 RX ADMIN — Medication 200 MG: at 11:33

## 2024-08-02 RX ADMIN — SODIUM CHLORIDE, SODIUM LACTATE, POTASSIUM CHLORIDE, AND CALCIUM CHLORIDE: .6; .31; .03; .02 INJECTION, SOLUTION INTRAVENOUS at 11:26

## 2024-08-02 RX ADMIN — CEFAZOLIN SODIUM 1000 MG: 1 SOLUTION INTRAVENOUS at 11:26

## 2024-08-02 NOTE — ANESTHESIA PROCEDURE NOTES
Peripheral Block    Patient location during procedure: holding area  Start time: 8/2/2024 3:15 PM  Reason for block: at surgeon's request and post-op pain management  Staffing  Performed by: Irving Ramírez MD  Authorized by: Irving Ramírez MD    Preanesthetic Checklist  Completed: patient identified, IV checked, site marked, risks and benefits discussed, surgical consent, monitors and equipment checked, pre-op evaluation and timeout performed  Peripheral Block  Prep: ChloraPrep  Patient monitoring: frequent blood pressure checks, continuous pulse oximetry and heart rate  Block type: Adductor Canal  Laterality: right  Injection technique: single-shot  Procedures: ultrasound guided, Ultrasound guidance required for the procedure to increase accuracy and safety of medication placement and decrease risk of complications.  Ultrasound permanent image saved  bupivacaine (PF) (MARCAINE) 0.5 % injection 20 mL - Perineural   12 mL - 8/2/2024 3:15:00 PM  bupivacaine liposomal (EXPAREL) 1.3 % injection 20 mL - Perineural   8 mL - 8/2/2024 3:15:00 PM  Needle  Needle type: Stimuplex   Needle gauge: 20 G  Needle length: 4 in  Needle localization: anatomical landmarks and ultrasound guidance  Assessment  Injection assessment: incremental injection, frequent aspiration, injected with ease, negative aspiration, negative for heart rate change, no paresthesia on injection, no symptoms of intraneural/intravenous injection and needle tip visualized at all times  Paresthesia pain: none  Post-procedure:  site cleaned  patient tolerated the procedure well with no immediate complications

## 2024-08-02 NOTE — DISCHARGE INSTR - AVS FIRST PAGE
Dr. Bautista  Post-Operative Instructions    1. Take your prescribed medication as needed. You can take ibuprofen in between doses of the narcotic if needed.   2. Upon arrival at home, lie down and elevate your surgical foot on 2 pillows.  3. Remain quiet, off your feet as much as possible, for the first 24-48 hours. This is when your feet first swell and may become painful. After 48 hours you may begin limited walking following these restrictions: non weight bearing to the right lower extremity     4. Drink large quantities of water. Consume no alcohol. Continue a well-balanced diet.  5. Report any unusual discomfort or fever to this office.  6. A limited amount of discomfort and swelling is to be expected. In some cases the skin may take on a bruised appearance. The surgical solution that was applied to your foot prior to the operation is dark in color and the operation site may appear to be oozing when it actually is not.  7. A slight amount of blood is to be expected, and is no cause for alarm. Do not remove the dressings. If there is active bleeding and if the bleeding persists, add additional gauze to the bandage, apply direct pressure, elevate your feet and call this office.  8. Do not get the dressings wet. As regular bathing may be inconvenient, sponge baths are recommended. If you shower, make sure the dressing stays dry.   9. When anesthesia wears off and if any discomfort should be present, apply an ice pack directly over the operated area for 15 minute intervals for several hours or until the pain leaves. (USE IN EXCESS OF 15 MINUTES COULD CAUSE FROSTBITE). Do not use hot water bags or electric pads. A convenient icepack can be made by placing ice cubes in a plastic bag and covering this with a towel.  10. If necessary, take a mild laxative before retiring.  11. Wear your special open shoes anytime you put weight on your foot, even if it is just to walk to the bathroom and back. It will probably be 2  or 3 weeks before you will be permitted to try regular shoes.  12. Having performed the operation, we are interested in a prompt recovery. Please cooperate by following the above instructions.  13. Please call to confirm your post-op appointment or call with any other questions.

## 2024-08-02 NOTE — DISCHARGE SUMMARY
Discharge Summary Outpatient Procedure Podiatry -   Blanca Mason 53 y.o. female MRN: [id number]  Unit/Bed#: OR POOL Encounter: [id number]    Admission Date: 8/2/2024     Admitting Diagnosis: Pain in right foot [M79.671]  Hallux valgus (acquired), right foot [M20.11]  Other hammer toe(s) (acquired), right foot [M20.41]  Pain in right toe(s) [M79.674]  Metatarsalgia, right foot [M77.41]  Dislocation of metatarsophalangeal joint of right lesser toe(s), sequela [S93.124S]    Discharge Diagnosis: same    Procedures Performed: RIGHT FOOT LAPIDUS BUNIONECTOMY, 2ND HAMMERTOE REPAIR, SECOND METATARSAL OSTEOTOMY WITH SECOND PLANTAR PLATE REPAIR: 08436 (CPT®)    Complications: none    Condition at Discharge: stable    Discharge instructions/Information to patient and family:   See after visit summary for information provided to patient and family.      Provisions for Follow-Up Care/Important appointments:  See after visit summary for information related to follow-up care and any pertinent home health orders.      Discharge Medications:  See after visit summary for reconciled discharge medications provided to patient and family.

## 2024-08-02 NOTE — ANESTHESIA PREPROCEDURE EVALUATION
Procedure:  BUNION REPAIR WITH LAPIDUS BUNIONECTOMY. POSS. SKIN OSTEOTOMY. SECOND METATARSAL OSTEOTOMY WITH PLANTAR REPAIR, SECOND HAMMERTOE REPAIR (Right: Foot)    Relevant Problems   CARDIO   (+) Benign essential hypertension   (+) Chronic migraine without aura without status migrainosus, not intractable   (+) Mixed hyperlipidemia      ENDO   (+) Hypothyroidism      MUSCULOSKELETAL   (+) Cervical spondylosis   (+) Chronic low back pain      NEURO/PSYCH   (+) Anxiety   (+) Chronic low back pain   (+) Chronic migraine without aura without status migrainosus, not intractable   (+) Major depressive disorder      PULMONARY   (+) COPD (chronic obstructive pulmonary disease) (HCC)   (+) MAG (obstructive sleep apnea)      Behavioral Health   (+) Borderline personality disorder (HCC)   (+) Mood disorder (HCC)   (+) Substance abuse (HCC)      Neurology/Sleep   (+) Cognitive impairment      Urinary   (+) Overactive bladder      Blood   (+) Platelets decreased (HCC)      Oncology   (+) Yan's esophagus determined by biopsy   (+) Family history of renal cancer      Rheumatology   (+) Sjogren's syndrome (HCC)      Surgery/Wound/Pain   (+) Bilateral leg edema      Orthopedic/Musculoskeletal   (+) Cervicalgia      Other   (+) Anticoagulation management encounter   (+) History of DVT (deep vein thrombosis)   (+) History of pulmonary embolism   (+) Lymphedema   (+) Obesity, morbid, BMI 50 or higher ([facility])   (+) Prediabetes      Lab Results   Component Value Date    SODIUM 144 07/10/2024    K 3.9 07/10/2024     07/10/2024    CO2 21 07/10/2024    AGAP 19 (H) 07/10/2024    BUN 15 07/10/2024    CREATININE 0.68 07/10/2024    GLUC 84 07/10/2024    GLUF 88 01/29/2024    CALCIUM 9.7 07/10/2024    AST 18 07/10/2024    ALT 21 07/10/2024    ALKPHOS 78 07/10/2024    TP 7.4 07/10/2024    TBILI 0.31 07/10/2024    EGFR 100 07/10/2024     Lab Results   Component Value Date    WBC 10.73 (H) 07/10/2024    HGB 14.4 07/10/2024    HCT 44.4  07/10/2024    MCV 91 07/10/2024    PLT  07/10/2024      Comment:      Not reported due to platelet clumping.         Physical Exam    Airway    Mallampati score: III  TM Distance: <3 FB  Neck ROM: full     Dental       Cardiovascular      Pulmonary      Other Findings  post-pubertal.      Anesthesia Plan  ASA Score- 3     Anesthesia Type- general with ASA Monitors.         Additional Monitors:     Airway Plan: LMA.           Plan Factors-Exercise tolerance (METS): >4 METS.    Chart reviewed. EKG reviewed. Imaging results reviewed. Existing labs reviewed. Patient summary reviewed.                  Induction- intravenous.    Postoperative Plan-         Informed Consent- Anesthetic plan and risks discussed with patient.  I personally reviewed this patient with the CRNA. Discussed and agreed on the Anesthesia Plan with the CRNA..

## 2024-08-02 NOTE — TELEPHONE ENCOUNTER
Received a phone call from patient.  Patient stated that she is having surgery today.  Patient received lovenox prescription from Heather and inquiring when patient should have labs drawn to start coumadin again.  Please call patient with updates.  Patient stated that may leave voicemail with instructions.

## 2024-08-02 NOTE — ANESTHESIA POSTPROCEDURE EVALUATION
Post-Op Assessment Note    CV Status:  Stable  Pain Score: 0    Pain management: adequate       Mental Status:  Alert and awake   Hydration Status:  Euvolemic   PONV Controlled:  Controlled   Airway Patency:  Patent     Post Op Vitals Reviewed: Yes    No anethesia notable event occurred.    Staff: Anesthesiologist, CRNA               BP   145/79   Temp   98   Pulse  73   Resp   16   SpO2   99

## 2024-08-02 NOTE — OP NOTE
OPERATIVE REPORT  PATIENT NAME: Blanca Mason    :  1970  MRN: [id number]  Pt Location: [facility] OR ROOM 03    SURGERY DATE: 2024    Surgeons and Role:     * Kaz Bautista, DPM - Primary     * Collins Pete, DPM - Assisting    Preop Diagnosis:  Pain in right foot [M79.671]  Hallux valgus (acquired), right foot [M20.11]  Other hammer toe(s) (acquired), right foot [M20.41]  Pain in right toe(s) [M79.674]  Metatarsalgia, right foot [M77.41]  Dislocation of metatarsophalangeal joint of right lesser toe(s), sequela [S93.124S]    Post-Op Diagnosis Codes:     * Pain in right foot [M79.671]     * Hallux valgus (acquired), right foot [M20.11]     * Other hammer toe(s) (acquired), right foot [M20.41]     * Pain in right toe(s) [M79.674]     * Metatarsalgia, right foot [M77.41]     * Dislocation of metatarsophalangeal joint of right lesser toe(s), sequela [S93.124S]    Procedure(s):  Right - RIGHT FOOT LAPIDUS BUNIONECTOMY. 2ND HAMMERTOE REPAIR. SECOND METATARSAL OSTEOTOMY WITH SECOND PLANTAR PLATE REPAIR    Specimen(s):  * No specimens in log *    Estimated Blood Loss:   20 mL    Drains:  * No LDAs found *    Anesthesia Type:   IV Sedation with Anesthesia    Operative Indications:  Pain in right foot [M79.671]  Hallux valgus (acquired), right foot [M20.11]  Other hammer toe(s) (acquired), right foot [M20.41]  Pain in right toe(s) [M79.674]  Metatarsalgia, right foot [M77.41]  Dislocation of metatarsophalangeal joint of right lesser toe(s), sequela [S93.124S]      Operative Findings:  Consistent with above       Complications:   None    Procedure and Technique:  Patient brought back to the operating under light sedation and transferred to the operating table in the supine position.  After anesthesia was administered a tourniquet was placed around the patient's right lower extremity with ample Webril padding.  The right extremities prepped and draped in the normal sterile manner.  Given the patient's  allergies to all surgical prep ChloraPrep was utilized and then an alcohol prep was used to remove any ChloraPrep on the skin.  Utilizing Esmarch bandage the right lower extremity was exsanguinated and the tourniquet was inflated to 250 mmHg.    Plantar plate repair:  Attention was then drawn to the second metatarsal.  Utilizing a Lachman's test significant hammertoe deformity was noted at the metatarsal phalangeal joint with a floating toe.  A 4 cm linear incision was made overlying the second metatarsal phalangeal joint distally to the proximal interphalangeal joint.  Blunt dissection was carried down to the extensor tendon overlying the metatarsophalangeal joint.  It was retracted.  A capsulotomy was made over the metatarsal phalangeal joint and the metatarsal heads were exposed.  An osteotomy of the second metatarsal head was made to expose the plantar plate.  Utilizing a pin and distractor the metatarsal phalangeal joint was distracted to visualize the plantar plate.  Under manufacture recommended suggestion the Intellisense plantar plate repair system was utilized to repair the plantar plate.  A more rectus position of the metatarsal phalangeal joint was noted.    Weil:   Attention was drawn to the metatarsal head.  Under C-arm evaluation it was noted to be in line with the second metatarsal head.  Utilizing a 2.0 micro Asnis screw the capital fragment was fixated to the remaining metatarsal shaft.  Any EXTR bony fragments were removed utilizing a rongeur.    Hammertoe repair with implant:    Attention was then directed to the proximal interphalangeal joint.. Dissection was carried to the level of the EDL tendon. The tendon was then transected at that level. The PIPJ was then exposed and the medial and lateral collateral ligaments were incised to expose the head of the proximal phalanx. By use of sagittal saw, the head of the proximal phalanx was resected.  A sagittal saw was used to remove the articular cartilage  off of the base of the middle phalange. Push test showed excellent reduction of hammertoe deformity. At this time, a phalanx implant and K-wire was used to maintain corrected positition of the hammertoe, inserting the the implant in the proximal and middle phalanx per manufacture guidelines.  More rectus positioning of the toe was noted.  Incision was then copiously flushed utilizing sterile saline.  Tendon reapproximation was then completed with 4-0 vicryl.     Bunion Correction, Lapidus:    Attention was the directed to the dorsal aspect of the Right 1st TMTJ where an approximately 5cm linear incision was made through skin. Blunt dissection was carried through the subcutaneous tissues, with care taken to protect any neurovascular structures. Electrocautery was used as needed for any bleeders.  The EHL tendon was identified and retracted laterally. Sharp dissection was then continued to the level of the periosteum, which was reflected off of the surface of the medial cuneiform and base of the 1st metatarsal. An osteotome was introduced into the TMTJ and was freed from any soft tissue structures to gain mobility. A threaded K Wire was then inserted from dorsal to plantar at the body of the medial coneiform. A Lapifuse joint distractor was placed into position. A second k wire was then advanced through the distal brenda of the distractor to secure it in place. The TMTJ was distracted and the cartilagenous surface of the cuneiform and base of 1st metatarsal was removed, and the subchondral bone was then fenestrated with a drill. The distractor was then removed. Attention was then directed to the dorsomedial 1st MPJ where an approximately 4cm linear incision was made through skin. Blunt dissection was carried through the subcutaneous tissues, with care taken to protect any neurovascular structures. Electrocautery was used as needed for any bleeders.  The EHL tendon was identified and retracted laterally. Sharp dissection  was then continued to the level of the capsule, where a linear capsulotomy was performed. The capsular structures were reflected off of the medial metatarsal head and that medial eminence was exposed. A bicortical k wire was inserted at the head of the first metatarsal from medial to lateral, keeping perpendicular to the shaft of the 2nd metatarsal.  A Lapifuse clamp was then positioned over the distal forefoot and a triplanar bunion correction was performed. Correct position and reduction of deformity was confirmed on C-arm. The k wires over the base of the 1st metatarsal base and cuneiform were removed. A guidewire was inserted from medial to lateral including the base of the 1st metatarsal, medial cuneiform, and intermediate cuneiform. A  compression screw was inserted. The guidewire was removed. A  Lapifuse Standard plate was placed over the 1st TMTJ , correct position was verified on C arm. This was secured with a combination of locking screws with a single eccentrically drilled compression screw centrally.  An additional compression screw was then inserted from the base of the first metatarsal into the base of the second metatarsal.  Excellent position was noted CR.  The surgical site was irrigated with NSS. The medial hypertrophy of the 1st metatarsal head was resected by used of sagittal saw. The surgical incision was irrigated with copious amounts of normal sterile saline.    All surgical sites were then copiously flushed utilizing sterile saline.  Deep closure was achieved utilizing 3-0 Vicryl.  Subcuticular closure was achieved utilizing 3-0 Monocryl.  Skin closure was achieved utilizing 3-0 Prolene in a running type fashion.  Tourniquet was deflated with a total time of 120 minutes and excellent hyperemic spots was noted.  Patient tolerated procedure well with no B complications.     Dr. Bautista was present for the entire procedure.    Patient Disposition:  PACU         SIGNATURE: Collins Pete,  DP  DATE: August 2, 2024  TIME: 3:00 PM

## 2024-08-02 NOTE — TELEPHONE ENCOUNTER
Attempted to return call to discuss Heather Ramirez's instructions, no answer. Left message to call back.

## 2024-08-02 NOTE — ANESTHESIA PROCEDURE NOTES
Peripheral Block    Patient location during procedure: PACU  Start time: 8/2/2024 3:15 PM  Reason for block: at surgeon's request and post-op pain management  Staffing  Performed by: Irving Ramírez MD  Authorized by: Irving Ramírez MD    Preanesthetic Checklist  Completed: patient identified, IV checked, site marked, risks and benefits discussed, surgical consent, monitors and equipment checked, pre-op evaluation and timeout performed  Peripheral Block  Patient position: supine  Prep: ChloraPrep  Patient monitoring: frequent blood pressure checks, continuous pulse oximetry and heart rate  Block type: Popliteal  Laterality: right  Injection technique: single-shot  Procedures: ultrasound guided, Ultrasound guidance required for the procedure to increase accuracy and safety of medication placement and decrease risk of complications.  Ultrasound permanent image saved  bupivacaine liposomal (EXPAREL) 1.3 % injection 20 mL - Perineural   12 mL - 8/2/2024 3:15:00 PM  bupivacaine (PF) (MARCAINE) 0.5 % injection 20 mL - Perineural   18 mL - 8/2/2024 3:15:00 PM  Needle  Needle type: Stimuplex   Needle gauge: 20 G  Needle length: 4 in  Needle localization: anatomical landmarks and ultrasound guidance  Assessment  Injection assessment: incremental injection, frequent aspiration, injected with ease, negative aspiration, negative for heart rate change, no paresthesia on injection, no symptoms of intraneural/intravenous injection and needle tip visualized at all times  Paresthesia pain: none  Post-procedure:  site cleaned  patient tolerated the procedure well with no immediate complications

## 2024-08-02 NOTE — INTERVAL H&P NOTE
H&P reviewed. After examining the patient I find no changes in the patients condition since the H&P had been written.    Vitals:    08/02/24 0824   BP: 149/86   Pulse: 77   Resp: 18   Temp: 97.6 °F (36.4 °C)   SpO2: 96%

## 2024-08-04 ENCOUNTER — TELEPHONE (OUTPATIENT)
Dept: HEMATOLOGY ONCOLOGY | Facility: CLINIC | Age: 54
End: 2024-08-04

## 2024-08-04 NOTE — TELEPHONE ENCOUNTER
Spoke with Blanca to check if she has been taking the Coumadin with the Lovenox injections as she is being bridged per our instructions at the office visit.  Patient reports that she was not aware although this was discussed at the office visit.  Informed her to take Coumadin 7 mg now and continue the Lovenox injections twice daily.  We will send mobile labs to her house to get done either Wednesday or Thursday for the next 3 to 4 weeks.  Informed her to expect a call from them to schedule it.  Patient voiced understanding and verbally repeated that she will be taking Coumadin and Lovenox injections.

## 2024-08-05 ENCOUNTER — TELEPHONE (OUTPATIENT)
Dept: LAB | Facility: HOSPITAL | Age: 54
End: 2024-08-05

## 2024-08-05 ENCOUNTER — TELEPHONE (OUTPATIENT)
Age: 54
End: 2024-08-05

## 2024-08-05 NOTE — TELEPHONE ENCOUNTER
Spoke with patient. She is  going to the lab on Thursday and will have her INR drawn. Patient will need weekly labs starting next week for a month. Patient verbalized understanding.

## 2024-08-05 NOTE — TELEPHONE ENCOUNTER
Spoke with Mattie from mobile lab outreach to have them reach out to the patient to schedule her mobile labs this week and weekly for one month. The schedulers will be reaching out to the patient.

## 2024-08-05 NOTE — TELEPHONE ENCOUNTER
Patient got a call from the mobile lab to set up her blood work. They told her that the first time they can get her in isnt until 8/12 and she would like to know what else she should do

## 2024-08-06 ENCOUNTER — APPOINTMENT (OUTPATIENT)
Dept: PHYSICAL THERAPY | Age: 54
End: 2024-08-06
Payer: COMMERCIAL

## 2024-08-06 DIAGNOSIS — F25.0 SCHIZOAFFECTIVE DISORDER, BIPOLAR TYPE (HCC): Chronic | ICD-10-CM

## 2024-08-06 DIAGNOSIS — E66.01 MORBID OBESITY WITH BMI OF 45.0-49.9, ADULT (HCC): ICD-10-CM

## 2024-08-06 RX ORDER — NALTREXONE HYDROCHLORIDE 50 MG/1
50 TABLET, FILM COATED ORAL DAILY
Qty: 90 TABLET | Refills: 0 | Status: SHIPPED | OUTPATIENT
Start: 2024-08-06

## 2024-08-06 RX ORDER — CLONIDINE HYDROCHLORIDE 0.2 MG/1
0.2 TABLET ORAL EVERY 12 HOURS SCHEDULED
Qty: 180 TABLET | Refills: 1 | Status: SHIPPED | OUTPATIENT
Start: 2024-08-06 | End: 2025-02-02

## 2024-08-08 ENCOUNTER — TELEPHONE (OUTPATIENT)
Dept: HEMATOLOGY ONCOLOGY | Facility: CLINIC | Age: 54
End: 2024-08-08

## 2024-08-08 ENCOUNTER — APPOINTMENT (OUTPATIENT)
Dept: LAB | Facility: CLINIC | Age: 54
End: 2024-08-08
Payer: COMMERCIAL

## 2024-08-08 DIAGNOSIS — Z86.711 HISTORY OF PULMONARY EMBOLISM: ICD-10-CM

## 2024-08-08 DIAGNOSIS — Z51.81 ANTICOAGULATION MANAGEMENT ENCOUNTER: ICD-10-CM

## 2024-08-08 DIAGNOSIS — Z79.01 ANTICOAGULATION MANAGEMENT ENCOUNTER: ICD-10-CM

## 2024-08-08 DIAGNOSIS — Z86.718 HISTORY OF DVT (DEEP VEIN THROMBOSIS): ICD-10-CM

## 2024-08-08 LAB
INR PPP: 1.81 (ref 0.85–1.19)
PROTHROMBIN TIME: 21.1 SECONDS (ref 12.3–15)

## 2024-08-08 PROCEDURE — 36415 COLL VENOUS BLD VENIPUNCTURE: CPT

## 2024-08-08 PROCEDURE — 85610 PROTHROMBIN TIME: CPT

## 2024-08-08 RX ORDER — ENOXAPARIN SODIUM 150 MG/ML
1 INJECTION SUBCUTANEOUS EVERY 12 HOURS
Qty: 20 ML | Refills: 0 | Status: SHIPPED | OUTPATIENT
Start: 2024-08-08 | End: 2024-08-18

## 2024-08-08 NOTE — TELEPHONE ENCOUNTER
Spoke with patient to see what dose of coumadin she is on as well as if she is taking her lovenox. Patient states that she has been taking 5mg coumadin daily and is taking her lovenox BID. I informed her that JHONATAN Romero would like her to increase her coumadin to 7mg daily and continue with the lovenox. Patient only has 3 days worth of lovenox left, so will need a refill. I informed her that a refill will be sent to her pharmacy shortly. Patient knows that we will call her with further instructions on 8/14 with her INR result. Patient verbalized understanding and is in agreement with the plan.

## 2024-08-08 NOTE — TELEPHONE ENCOUNTER
Left a message for Blanca informing her that I need to go over her INR and Coumadin instructions as soon as possible.  Provided callback number.    Please transfer call to Barb WEBER should the patient call.  We need to find out what dose of Eliquis she is taking right now, as it 5 mg or 7 mg and to confirm that she is taking the Lovenox injections twice daily.

## 2024-08-09 ENCOUNTER — APPOINTMENT (OUTPATIENT)
Dept: PHYSICAL THERAPY | Age: 54
End: 2024-08-09
Payer: COMMERCIAL

## 2024-08-13 ENCOUNTER — OFFICE VISIT (OUTPATIENT)
Dept: PHYSICAL THERAPY | Age: 54
End: 2024-08-13
Payer: COMMERCIAL

## 2024-08-13 DIAGNOSIS — M75.32 CALCIFIC TENDONITIS OF LEFT SHOULDER: Primary | ICD-10-CM

## 2024-08-13 PROCEDURE — 97110 THERAPEUTIC EXERCISES: CPT

## 2024-08-13 PROCEDURE — 97112 NEUROMUSCULAR REEDUCATION: CPT

## 2024-08-13 PROCEDURE — 97140 MANUAL THERAPY 1/> REGIONS: CPT

## 2024-08-13 NOTE — PROGRESS NOTES
"Daily Note     Today's date: 2024  Patient name: Blanca Mason  : 1970  MRN: [id number]  Referring provider: Ann Marie Ge DO  Dx:   Encounter Diagnosis     ICD-10-CM    1. Calcific tendonitis of left shoulder  M75.32           Start Time: 0900  Stop Time: 1000  Total time in clinic (min): 60 minutes    Subjective: \"My left sh pain is a 5 today.\"      Objective: See treatment diary below      Assessment: Tolerated treatment well. Patient would benefit from continued PT      Plan: Continue per plan of care.         2.    3.    4.    5.    6.      7.    8.    9.    10.    11.    12.      13.    14.    15.    16.  17.  18.      19.    20.   21.   22.   23.   24.      25.    26. 27. 28. 29. 30.   31. 32.  33. 34. 35. 36.        Daily Treatment Diary     Manuals          PROM L shoulder  man  Man 15min and 15 min cross friction massage bicep tendon long head and short head         Gilbert taping left sh for relocation of head of left humerus  Man 15 min  removed                       Therapeutic Exercise             Pulleys     5 min         Table slides- flex/scap :03x 10 flex 3 x 10 flex abd scap 3 x 10 flex , abd scap 3 x 10flex and abd         Scapular retraction 10x 10 x  2 x 10 2 x 10         Doorway pec stretch             Cane AAROM flexion/ ER                                        Shoulder isometrics   Left sh 1 set of 10 hold 5 sec Left10 reps 5 sec hold sh          Sidelying ER   10 10         Sidelying abduction             Sidelying flexion             TB rows             TB LPD                                                                              Modalities US 1.2 w/cm2 cont, zynex e stim IF  10 min us,  15 min estim IF while perf exer  10 min us,   15 min e stim           CP PRN                        "

## 2024-08-14 ENCOUNTER — NURSE TRIAGE (OUTPATIENT)
Age: 54
End: 2024-08-14

## 2024-08-14 ENCOUNTER — APPOINTMENT (OUTPATIENT)
Dept: LAB | Facility: HOSPITAL | Age: 54
End: 2024-08-14
Payer: COMMERCIAL

## 2024-08-14 ENCOUNTER — HOSPITAL ENCOUNTER (EMERGENCY)
Facility: HOSPITAL | Age: 54
Discharge: HOME | End: 2024-08-14
Attending: EMERGENCY MEDICINE
Payer: COMMERCIAL

## 2024-08-14 ENCOUNTER — TELEPHONE (OUTPATIENT)
Age: 54
End: 2024-08-14

## 2024-08-14 VITALS
OXYGEN SATURATION: 96 % | TEMPERATURE: 98.1 F | DIASTOLIC BLOOD PRESSURE: 92 MMHG | SYSTOLIC BLOOD PRESSURE: 144 MMHG | RESPIRATION RATE: 18 BRPM | HEART RATE: 83 BPM

## 2024-08-14 DIAGNOSIS — Z79.01 ANTICOAGULATION MANAGEMENT ENCOUNTER: ICD-10-CM

## 2024-08-14 DIAGNOSIS — R03.0 ELEVATED BLOOD PRESSURE READING: ICD-10-CM

## 2024-08-14 DIAGNOSIS — Z86.711 HISTORY OF PULMONARY EMBOLISM: Primary | ICD-10-CM

## 2024-08-14 DIAGNOSIS — Z51.81 ANTICOAGULATION MANAGEMENT ENCOUNTER: ICD-10-CM

## 2024-08-14 DIAGNOSIS — Z86.718 HISTORY OF DVT (DEEP VEIN THROMBOSIS): ICD-10-CM

## 2024-08-14 DIAGNOSIS — R79.1 ELEVATED INR: Primary | ICD-10-CM

## 2024-08-14 DIAGNOSIS — Z86.711 HISTORY OF PULMONARY EMBOLISM: ICD-10-CM

## 2024-08-14 LAB
ALBUMIN SERPL BCG-MCNC: 4.2 G/DL (ref 3.5–5)
ALP SERPL-CCNC: 88 U/L (ref 34–104)
ALT SERPL W P-5'-P-CCNC: 37 U/L (ref 7–52)
ANION GAP SERPL CALCULATED.3IONS-SCNC: 8 MMOL/L (ref 4–13)
AST SERPL W P-5'-P-CCNC: 19 U/L (ref 13–39)
BASOPHILS # BLD AUTO: 0.04 THOUSANDS/ÂΜL (ref 0–0.1)
BASOPHILS NFR BLD AUTO: 0 % (ref 0–1)
BILIRUB SERPL-MCNC: 0.33 MG/DL (ref 0.2–1)
BUN SERPL-MCNC: 12 MG/DL (ref 5–25)
CALCIUM SERPL-MCNC: 9.4 MG/DL (ref 8.4–10.2)
CHLORIDE SERPL-SCNC: 105 MMOL/L (ref 96–108)
CO2 SERPL-SCNC: 25 MMOL/L (ref 21–32)
CREAT SERPL-MCNC: 0.63 MG/DL (ref 0.6–1.3)
EOSINOPHIL # BLD AUTO: 0.11 THOUSAND/ÂΜL (ref 0–0.61)
EOSINOPHIL NFR BLD AUTO: 1 % (ref 0–6)
ERYTHROCYTE [DISTWIDTH] IN BLOOD BY AUTOMATED COUNT: 15 % (ref 11.6–15.1)
GFR SERPL CREATININE-BSD FRML MDRD: 103 ML/MIN/1.73SQ M
GLUCOSE SERPL-MCNC: 96 MG/DL (ref 65–140)
HCT VFR BLD AUTO: 44.1 % (ref 34.8–46.1)
HGB BLD-MCNC: 13.9 G/DL (ref 11.5–15.4)
HOLD SPECIMEN: NORMAL
IMM GRANULOCYTES # BLD AUTO: 0.19 THOUSAND/UL (ref 0–0.2)
IMM GRANULOCYTES NFR BLD AUTO: 2 % (ref 0–2)
INR PPP: 5.18 (ref 0.85–1.19)
INR PPP: 6.11 (ref 0.85–1.19)
LYMPHOCYTES # BLD AUTO: 1.95 THOUSANDS/ÂΜL (ref 0.6–4.47)
LYMPHOCYTES NFR BLD AUTO: 21 % (ref 14–44)
MCH RBC QN AUTO: 29.4 PG (ref 26.8–34.3)
MCHC RBC AUTO-ENTMCNC: 31.5 G/DL (ref 31.4–37.4)
MCV RBC AUTO: 93 FL (ref 82–98)
MONOCYTES # BLD AUTO: 0.76 THOUSAND/ÂΜL (ref 0.17–1.22)
MONOCYTES NFR BLD AUTO: 8 % (ref 4–12)
NEUTROPHILS # BLD AUTO: 6.22 THOUSANDS/ÂΜL (ref 1.85–7.62)
NEUTS SEG NFR BLD AUTO: 68 % (ref 43–75)
NRBC BLD AUTO-RTO: 0 /100 WBCS
PLATELET # BLD AUTO: 249 THOUSANDS/UL (ref 149–390)
PMV BLD AUTO: 9.7 FL (ref 8.9–12.7)
POTASSIUM SERPL-SCNC: 4 MMOL/L (ref 3.5–5.3)
PROT SERPL-MCNC: 7.6 G/DL (ref 6.4–8.4)
PROTHROMBIN TIME: 47.9 SECONDS (ref 12.3–15)
PROTHROMBIN TIME: 54.2 SECONDS (ref 12.3–15)
RBC # BLD AUTO: 4.73 MILLION/UL (ref 3.81–5.12)
SODIUM SERPL-SCNC: 138 MMOL/L (ref 135–147)
WBC # BLD AUTO: 9.27 THOUSAND/UL (ref 4.31–10.16)

## 2024-08-14 PROCEDURE — 80053 COMPREHEN METABOLIC PANEL: CPT | Performed by: EMERGENCY MEDICINE

## 2024-08-14 PROCEDURE — 99284 EMERGENCY DEPT VISIT MOD MDM: CPT | Performed by: EMERGENCY MEDICINE

## 2024-08-14 PROCEDURE — 85610 PROTHROMBIN TIME: CPT | Performed by: EMERGENCY MEDICINE

## 2024-08-14 PROCEDURE — 99283 EMERGENCY DEPT VISIT LOW MDM: CPT

## 2024-08-14 PROCEDURE — 36415 COLL VENOUS BLD VENIPUNCTURE: CPT

## 2024-08-14 PROCEDURE — 85610 PROTHROMBIN TIME: CPT

## 2024-08-14 PROCEDURE — 85025 COMPLETE CBC W/AUTO DIFF WBC: CPT | Performed by: EMERGENCY MEDICINE

## 2024-08-14 NOTE — ED PROVIDER NOTES
History  Chief Complaint   Patient presents with    Abnormal Lab     Pt presents for abnormally elevated INR.      Patient is a 53-year-old female, with a history significant for hysterectomy and VTE anticoagulated on warfarin per my review the medical record, who presents to the ED today, at the recommendation of her hematologist oncologist office, due to INR of 6.11 this morning.  Patient states she is feeling as though she is in her usual state of health at this time and denies blood in stool, headache, epistaxis, vaginal bleeding, chest pain, dyspnea, abdominal pain, urinary symptoms.  No clear exacerbating or remitting factors.  Patient is without other concerns at this time.        Prior to Admission Medications   Prescriptions Last Dose Informant Patient Reported? Taking?   Austedo XR 24 MG TB24   Yes No   Cholecalciferol (Vitamin D3) 125 MCG (5000 UT) capsule   No No   Sig: Take 1 capsule (5,000 Units total) by mouth daily   OXcarbazepine (TRILEPTAL) 150 mg tablet  Self No No   Sig: Take 3 tablets (450 mg total) by mouth every 12 (twelve) hours   Patient not taking: Reported on 6/4/2024   OXcarbazepine (TRILEPTAL) 300 mg tablet  Self Yes No   Sig: Take 300 mg by mouth every 12 (twelve) hours   atoMOXetine (STRATTERA) 40 mg capsule  Self No No   Sig: Take 1 capsule (40 mg total) by mouth daily   Patient not taking: Reported on 6/4/2024   atoMOXetine (STRATTERA) 60 mg capsule   Yes No   Sig: Take 60 mg by mouth daily   benztropine (COGENTIN) 1 mg tablet   Yes No   buPROPion (FORFIVO XL) 450 MG 24 hr tablet  Self No No   Sig: Take 1 tablet (450 mg total) by mouth daily   Patient not taking: Reported on 6/4/2024   buPROPion (WELLBUTRIN XL) 150 mg 24 hr tablet   Yes No   cloNIDine (CATAPRES) 0.2 mg tablet   No No   Sig: TAKE 1 TABLET (0.2 MG TOTAL) BY MOUTH EVERY 12 (TWELVE) HOURS   cyanocobalamin (VITAMIN B-12) 1000 MCG tablet   No No   Sig: Take 1 tablet (1,000 mcg total) by mouth daily   Patient not taking:  Reported on 2024   enoxaparin (LOVENOX) 150 mg/mL injection   No No   Sig: Inject 0.9 mL (135 mg total) under the skin every 12 (twelve) hours for 10 days Start with your coumadin post surgery as discussed   furosemide (LASIX) 20 mg tablet   No No   Sig: Take 1 tablet (20 mg total) by mouth daily   levocetirizine (XYZAL) 5 MG tablet   No No   Sig: Take 1 tablet (5 mg total) by mouth every evening   levothyroxine (Euthyrox) 125 mcg tablet   No No   Sig: Take 1 tablet (125 mcg total) by mouth daily in the early morning   lidocaine (Lidoderm) 5 %   No No   Sig: Apply 1 patch topically over 12 hours daily Remove & Discard patch within 12 hours or as directed by MD   Patient not taking: Reported on 2024   losartan (COZAAR) 100 MG tablet   No No   Sig: Take 1 tablet (100 mg total) by mouth daily   lurasidone (LATUDA) 40 mg tablet  Self Yes No   metFORMIN (GLUCOPHAGE-XR) 750 mg 24 hr tablet   No No   Sig: Take 1 tablet (750 mg total) by mouth daily with breakfast   methylPREDNISolone 4 MG tablet therapy pack   No No   Sig: Use as directed on package   naltrexone (REVIA) 50 mg tablet   No No   Sig: TAKE 1 TABLET BY MOUTH DAILY   naproxen (Naprosyn) 500 mg tablet   No No   Sig: Take 1 tablet (500 mg total) by mouth every 12 (twelve) hours as needed (pain) for up to 10 days Take with food.   nitrofurantoin (MACROBID) 100 mg capsule   Yes No   oxybutynin (DITROPAN) 5 mg tablet   No No   Sig: Take 1 tablet (5 mg total) by mouth 2 (two) times a day   paliperidone palmitate ER (INVEGA) 234 mg/1.5 mL IM injection  Self No No   Si.5 mL (234 mg total) by IM- Deltoid route every 4 weeks Do not start before 2024.   pantoprazole (PROTONIX) 40 mg tablet  Self No No   Sig: Take 1 tablet (40 mg total) by mouth daily in the early morning   pilocarpine (SALAGEN) 5 mg tablet   No No   Sig: TAKE 1 TABLET (5 MG TOTAL) BY MOUTH THREE (THREE) TIMES A DAY   topiramate (TOPAMAX) 200 MG tablet  Self No No   Sig: Take 1  "tablet (200 mg total) by mouth 2 (two) times a day   traZODone (DESYREL) 100 mg tablet   Yes No   trospium chloride (SANCTURA) 20 mg tablet   Yes No   Sig: Take 20 mg by mouth 2 (two) times a day   warfarin (COUMADIN) 2 mg tablet   No No   Sig: take 3 & 1/2 tablets (7MG) once a day Saturday and Sunday and take 2 & 1/2 tablets (5MG) once a day Monday through Friday   warfarin (COUMADIN) 5 mg tablet   No No   Sig: Take 1 tablet (5 mg total) by mouth daily      Facility-Administered Medications: None       Past Medical History:   Diagnosis Date    Acute deep vein thrombosis of lower leg, left (Formerly [facility]) 10/16/2023    Acute heart failure, unspecified heart failure type (Formerly [facility]) 05/06/2024    Anxiety     Arthritis     oa cassandra knees    Asthma     good control- no medications    Yan's esophagus     Bipolar affective disorder (Formerly [facility])     Cannabis abuse with unspecified 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Formerly [facility])     Chronic pain disorder     lumbar    Contusion of elbow 12/21/2021    COPD (chronic obstructive pulmonary disease) (Formerly [facility])     CPAP (continuous positive airway pressure) dependence     DDD (degenerative disc disease), lumbar 04/09/2015    Degenerative disc disease at L5-S1 level     Deliberate self-cutting     9/15/22per pt has not done recently-- does see a therapist and psychiatrist regularly    Depression 09/16/2008    Diarrhea 01/06/2022    Disease of thyroid gland     hypo    MARTINEZ (dyspnea on exertion)     per pt \"has had this with exertion and not new\"    Drug overdose 10/28/2008    suicide attempt and again drug overdose 7/2022--[facility]Medical floor and than transferred to [facility]    Dysphagia     Dyspnea     Ecchymosis 01/06/2022    Edema     BLE    Elevated troponin 09/02/2023    Family history of blood clots     GERD (gastroesophageal reflux disease)     Grief 11/11/2023    Headache(784.0)     Headache, tension-type     Hemorrhage of rectum and anus 10/02/2017    Formatting of this note might be different from the " original.  Added automatically from request for surgery [id number]    High blood pressure     History of COVID-19 12/30/2020    Symptoms started on 12/30/2020 and then got worse.  Today she is feeling a little bit better.  She is a candidate for monoclonal antibody infusion and set for 1/6/21 @ 1pm at [facility]. 01/07/21 - telemedicine -     Hyperlipidemia     Hypertension     Intentional overdose (HCC) 09/27/2023    Intentional self-harm by unspecified sharp object, sequela (HCC) 02/09/2023    Knee pain, bilateral     Especially right    Knee pain, right 02/23/2022    Medial epicondylitis of elbow, left 04/03/2018    Formatting of this note might be different from the original.  Added automatically from request for surgery [id number]    Medial epicondylitis of right elbow 07/17/2023    Medical marijuana use     Memory difficulties 08/06/2020    Memory loss     Migraines     Obesity     Other psychoactive substance abuse with unspecified psychoactive substance-induced disorder (HCC) 05/06/2024    Overactive bladder     Polysubstance abuse (HCC) 09/29/2023    Potential for cognitive impairment 06/17/2021    Primary osteoarthritis of both knees 08/08/2018    Pulmonary emboli (HCC)     Restrictive lung disease 02/01/2017    Last Assessment & Plan:   Formatting of this note might be different from the original.  I reviewed this problem throughout the rest of the note. Please refer to the other assessments for details.    Sjogren's disease (HCC)     Sleep apnea     Stress incontinence     Suicidal deliberate poisoning (HCC) 07/13/2022    Suicidal ideations     Teeth missing     Toxic encephalopathy 11/08/2023    Tremor     per pt Tremors of Right Leg and both Arms    Visual impairment     Wears glasses        Past Surgical History:   Procedure Laterality Date    BREAST CYST EXCISION Right 1989    CARPAL TUNNEL RELEASE Left     CHOLECYSTECTOMY  05/2003    Laparoscopic    COLONOSCOPY      01/12/2009    DILATION AND CURETTAGE  OF UTERUS      ELBOW SURGERY Left     x2. No hardware    ESOPHAGOGASTRODUODENOSCOPY      FOOT SURGERY Left     Plantar fasciotomy    HERNIA REPAIR      HYSTERECTOMY      laporoscopic, partial    KNEE ARTHROSCOPY Bilateral     OOPHORECTOMY Left 10/2015    PA CORRJ HLX VLGS BNCTY SESMDC JOINT ARTHRODESIS Right 8/2/2024    Procedure: RIGHT FOOT LAPIDUS BUNIONECTOMY, 2ND HAMMERTOE REPAIR, SECOND METATARSAL OSTEOTOMY WITH SECOND PLANTAR PLATE REPAIR;  Surgeon: Kaz Bautista DPM;  Location: [facility];  Service: Podiatry    PA GASTROCNEMIUS RECESSION Left 02/24/2021    Procedure: RECESSION GASTROC OPEN;  Surgeon: Nomi Yang DPM;  Location: [facility];  Service: Podiatry    PA RPR UMBILICAL HRNA 5 YRS/> REDUCIBLE N/A 04/24/2019    Procedure: REPAIR HERNIA UMBILICAL LAPAROSCOPIC W/ ROBOTICS;  Surgeon: Anahi Colon MD;  Location: [facility];  Service: General    PA SPHINCTEROTOMY ANAL DIVISION SPHINCTER SPX N/A 08/31/2018    Procedure: EUA, LEFT PARTIAL INTERNAL SPHINCTEROTOMY;  Surgeon: Tien Reyes MD;  Location: [facility];  Service: Colorectal    PA TNOT ELBOW LATERAL/MEDIAL DEBRIDE OPEN TDN RPR Left 09/20/2022    Procedure: RELEASE EPICONDYLAR ELBOW MEDIAL;  Surgeon: Kyree Winkler MD;  [facility];  Service: Orthopedics    REDUCTION MAMMAPLASTY Bilateral 1999    REPAIR LACERATION Left     left hand-5/18/2009    REPLACEMENT TOTAL KNEE Right     ROTATOR CUFF REPAIR Left     TONSILECTOMY AND ADNOIDECTOMY      US GUIDED MSK PROCEDURE  04/22/2021    VASCULAR SURGERY      VEIN LIGATION Bilateral     WISDOM TOOTH EXTRACTION         Family History   Problem Relation Age of Onset    Kidney cancer Mother     Diabetes Mother     Depression Mother     Stroke Mother     Arthritis Mother     Cancer Mother     Psychiatric Illness Mother     No Known Problems Father     No Known Problems Sister     No Known Problems Maternal Grandmother     No Known Problems Maternal Grandfather     No Known  Problems Paternal Grandmother     No Known Problems Paternal Grandfather     Colon cancer Maternal Uncle     Colon cancer Maternal Uncle     Colon cancer Paternal Uncle     Colon cancer Family     Alcohol abuse Sister     Asthma Sister      I have reviewed and agree with the history as documented.    E-Cigarette/Vaping    E-Cigarette Use Current Some Day User     Comments medical THC      E-Cigarette/Vaping Substances    Nicotine No     THC Yes     CBD No     Flavoring No     Other No     Unknown No      Social History     Tobacco Use    Smoking status: Former     Current packs/day: 0.00     Average packs/day: 2.0 packs/day for 33.0 years (66.0 ttl pk-yrs)     Types: Cigarettes     Start date: 1985     Quit date: 2018     Years since quittin.6    Smokeless tobacco: Never    Tobacco comments:     Started at age 16.   Vaping Use    Vaping status: Some Days    Substances: THC   Substance Use Topics    Alcohol use: Not Currently     Comment: Recovering alcoholic    Drug use: Yes     Types: Marijuana     Comment: Former meth use, medicinal marijuana       Review of Systems   Constitutional:  Negative for fever.   HENT:  Negative for trouble swallowing.    Eyes:  Negative for visual disturbance.   Respiratory:  Negative for shortness of breath.    Cardiovascular:  Negative for chest pain.   Gastrointestinal:  Negative for abdominal pain.   Endocrine: Negative for polyuria.   Genitourinary:  Negative for dysuria.   Musculoskeletal:  Negative for gait problem.   Skin:  Negative for rash.   Allergic/Immunologic: Positive for environmental allergies.   Neurological:  Negative for weakness and numbness.   Hematological:  Negative for adenopathy.   Psychiatric/Behavioral:  Negative for confusion.    All other systems reviewed and are negative.      Physical Exam  Physical Exam  Vitals and nursing note reviewed.   Constitutional:       General: She is not in acute distress.     Appearance: She is not toxic-appearing.       Comments: Patient appears comfortable during my evaluation    HENT:      Head: Normocephalic and atraumatic.      Right Ear: External ear normal.      Left Ear: External ear normal.      Nose: Nose normal. No rhinorrhea.      Mouth/Throat:      Mouth: Mucous membranes are moist.      Pharynx: Oropharynx is clear. No oropharyngeal exudate or posterior oropharyngeal erythema.      Comments: Uvula midline. No oropharyngeal or submandibular mass/swelling  Eyes:      General: No scleral icterus.        Right eye: No discharge.         Left eye: No discharge.      Conjunctiva/sclera: Conjunctivae normal.      Pupils: Pupils are equal, round, and reactive to light.   Neck:      Comments: Patient is spontaneously rotating their neck to the left and right during the history and physical exam interaction without difficulty or apparent discomfort    Cardiovascular:      Rate and Rhythm: Normal rate and regular rhythm.      Pulses: Normal pulses.      Heart sounds: Normal heart sounds. No murmur heard.     No friction rub. No gallop.      Comments: 2+ Radial  Pulmonary:      Effort: Pulmonary effort is normal. No respiratory distress.      Breath sounds: Normal breath sounds. No stridor. No wheezing, rhonchi or rales.   Abdominal:      General: Abdomen is flat. There is no distension.      Palpations: Abdomen is soft.      Tenderness: There is no abdominal tenderness. There is no right CVA tenderness, left CVA tenderness, guarding or rebound.   Musculoskeletal:         General: No deformity.      Cervical back: Neck supple. No tenderness. No muscular tenderness.      Comments: In lower extremity boot on the right lower extremity   Lymphadenopathy:      Cervical: No cervical adenopathy.   Skin:     General: Skin is warm and dry.      Capillary Refill: Capillary refill takes less than 2 seconds.   Neurological:      Mental Status: She is alert.      Comments: Patient is speaking clearly in complete sentences.  Patient is  answering appropriately and able follow commands.  Patient is moving all four extremities spontaneously.  No facial droop.  Tongue midline.      Psychiatric:         Mood and Affect: Mood normal.         Behavior: Behavior normal.         Vital Signs  ED Triage Vitals [08/14/24 1428]   Temperature Pulse Respirations Blood Pressure SpO2   98.1 °F (36.7 °C) 83 18 144/92 96 %      Temp src Heart Rate Source Patient Position - Orthostatic VS BP Location FiO2 (%)   -- -- Sitting Right arm --      Pain Score       --           Vitals:    08/14/24 1428   BP: 144/92   Pulse: 83   Patient Position - Orthostatic VS: Sitting         Visual Acuity      ED Medications  Medications - No data to display    Diagnostic Studies  Results Reviewed       Procedure Component Value Units Date/Time    Protime-INR [[id number]]  (Abnormal) Collected: 08/14/24 1431    Lab Status: Final result Specimen: Blood from Arm, Left Updated: 08/14/24 1652     Protime 47.9 seconds      INR 5.18    Narrative:      INR Therapeutic Range    Indication                                             INR Range      Atrial Fibrillation                                               2.0-3.0  Hypercoagulable State                                    2.0.2.3  Left Ventricular Asist Device                            2.0-3.0  Mechanical Heart Valve                                  -    Aortic(with afib, MI, embolism, HF, LA enlargement,    and/or coagulopathy)                                     2.0-3.0 (2.5-3.5)     Mitral                                                             2.5-3.5  Prosthetic/Bioprosthetic Heart Valve               2.0-3.0  Venous thromboembolism (VTE: VT, PE        2.0-3.0    [place] draw [[id number]] Collected: 08/14/24 1431    Lab Status: Final result Specimen: Blood from Arm, Left Updated: 08/14/24 1602    Narrative:      The following orders were created for panel order [place] draw.  Procedure                               Abnormality          Status                     ---------                               -----------         ------                     Green / Black tube on hold[[id number]]                       Final result                 Please view results for these tests on the individual orders.    Comprehensive metabolic panel [[id number]] Collected: 08/14/24 1431    Lab Status: Final result Specimen: Blood from Arm, Left Updated: 08/14/24 1454     Sodium 138 mmol/L      Potassium 4.0 mmol/L      Chloride 105 mmol/L      CO2 25 mmol/L      ANION GAP 8 mmol/L      BUN 12 mg/dL      Creatinine 0.63 mg/dL      Glucose 96 mg/dL      Calcium 9.4 mg/dL      AST 19 U/L      ALT 37 U/L      Alkaline Phosphatase 88 U/L      Total Protein 7.6 g/dL      Albumin 4.2 g/dL      Total Bilirubin 0.33 mg/dL      eGFR 103 ml/min/1.73sq m     Narrative:      National Kidney Disease Foundation guidelines for Chronic Kidney Disease (CKD):     Stage 1 with normal or high GFR (GFR > 90 mL/min/1.73 square meters)    Stage 2 Mild CKD (GFR = 60-89 mL/min/1.73 square meters)    Stage 3A Moderate CKD (GFR = 45-59 mL/min/1.73 square meters)    Stage 3B Moderate CKD (GFR = 30-44 mL/min/1.73 square meters)    Stage 4 Severe CKD (GFR = 15-29 mL/min/1.73 square meters)    Stage 5 End Stage CKD (GFR <15 mL/min/1.73 square meters)  Note: GFR calculation is accurate only with a steady state creatinine    CBC and differential [[id number]] Collected: 08/14/24 1431    Lab Status: Final result Specimen: Blood from Arm, Left Updated: 08/14/24 1443     WBC 9.27 Thousand/uL      RBC 4.73 Million/uL      Hemoglobin 13.9 g/dL      Hematocrit 44.1 %      MCV 93 fL      MCH 29.4 pg      MCHC 31.5 g/dL      RDW 15.0 %      MPV 9.7 fL      Platelets 249 Thousands/uL      nRBC 0 /100 WBCs      Segmented % 68 %      Immature Grans % 2 %      Lymphocytes % 21 %      Monocytes % 8 %      Eosinophils Relative 1 %      Basophils Relative 0 %      Absolute Neutrophils 6.22 Thousands/µL      Absolute  Immature Grans 0.19 Thousand/uL      Absolute Lymphocytes 1.95 Thousands/µL      Absolute Monocytes 0.76 Thousand/µL      Eosinophils Absolute 0.11 Thousand/µL      Basophils Absolute 0.04 Thousands/µL                    No orders to display              Procedures  Procedures         ED Course  ED Course as of 08/14/24 1904   Wed Aug 14, 2024   1644 Hemoglobin: 13.9  WNL   1725 Patient has neither questions nor concerns after receiving discharge instructions and return precautions                                                Medical Decision Making  Patient is a 53-year-old female, with a history significant for hysterectomy and VTE anticoagulated on warfarin per my review the medical record, who presents to the ED today, at the recommendation of her hematologist oncologist office, due to INR of 6.11 this morning.  Patient states she is feeling as though she is in her usual state of health at this time and denies blood in stool, headache, epistaxis, vaginal bleeding, chest pain, dyspnea, abdominal pain, urinary symptoms.  No clear exacerbating or remitting factors.  Patient is currently afebrile and hemodynamically stable.  Her physical exam is notable for overall well appearance, clear heart and lungs, soft and nontender abdomen.  This presentation is concerning for: Elevated INR without active bleeding.  I also considered erroneous reading.  Will investigate with CBC, CMP, INR.  If INR remains elevated between 4.5 and 10, patient will be advised to hold her next dose of warfarin and contact her hematologist oncologist, who manages her anticoagulation, to discuss potential for changes in dosing.    Amount and/or Complexity of Data Reviewed  Labs: ordered. Decision-making details documented in ED Course.                 Disposition  Final diagnoses:   Elevated blood pressure reading   Elevated INR     Time reflects when diagnosis was documented in both MDM as applicable and the Disposition within this note        Time User Action Codes Description Comment    8/14/2024  5:02 PM Mart Meeks Add [R03.0] Elevated blood pressure reading     8/14/2024  5:03 PM Mart Meeks Add [R79.1] Elevated INR     8/14/2024  5:03 PM Mart Meeks Modify [R03.0] Elevated blood pressure reading     8/14/2024  5:03 PM Mart Meeks Modify [R79.1] Elevated INR           ED Disposition       ED Disposition   Discharge    Condition   Stable    Date/Time   Wed Aug 14, 2024  5:06 PM    Comment   Blanca Mason discharge to home/self care.                   Follow-up Information       Follow up With Specialties Details Why Contact Info    JHONATAN Armando Nurse Practitioner Schedule an appointment as soon as possible for a visit   [address] [place], [place] [address] [id number] [phone]              Discharge Medication List as of 8/14/2024  5:06 PM        CONTINUE these medications which have NOT CHANGED    Details   atoMOXetine (STRATTERA) 60 mg capsule Take 60 mg by mouth daily, Starting Wed 3/6/2024, Historical Med      Austedo XR 24 MG TB24 Historical Med      benztropine (COGENTIN) 1 mg tablet Historical Med      buPROPion (WELLBUTRIN XL) 150 mg 24 hr tablet Historical Med      Cholecalciferol (Vitamin D3) 125 MCG (5000 UT) capsule Take 1 capsule (5,000 Units total) by mouth daily, Starting Thu 4/4/2024, Normal      cloNIDine (CATAPRES) 0.2 mg tablet TAKE 1 TABLET (0.2 MG TOTAL) BY MOUTH EVERY 12 (TWELVE) HOURS, Starting Tue 8/6/2024, Until Sun 2/2/2025, Normal      cyanocobalamin (VITAMIN B-12) 1000 MCG tablet Take 1 tablet (1,000 mcg total) by mouth daily, Starting Thu 4/4/2024, Until Tue 10/1/2024, Normal      enoxaparin (LOVENOX) 150 mg/mL injection Inject 0.9 mL (135 mg total) under the skin every 12 (twelve) hours for 10 days Start with your coumadin post surgery as discussed, Starting Thu 8/8/2024, Until Sun 8/18/2024, Normal      furosemide (LASIX) 20 mg tablet Take 1 tablet (20 mg total) by mouth daily,  Starting Thu 4/4/2024, Normal      levocetirizine (XYZAL) 5 MG tablet Take 1 tablet (5 mg total) by mouth every evening, Starting Wed 5/29/2024, Normal      levothyroxine (Euthyrox) 125 mcg tablet Take 1 tablet (125 mcg total) by mouth daily in the early morning, Starting Thu 4/4/2024, Normal      lidocaine (Lidoderm) 5 % Apply 1 patch topically over 12 hours daily Remove & Discard patch within 12 hours or as directed by MD, Starting Wed 5/29/2024, Normal      losartan (COZAAR) 100 MG tablet Take 1 tablet (100 mg total) by mouth daily, Starting Thu 4/4/2024, Normal      lurasidone (LATUDA) 40 mg tablet Historical Med      metFORMIN (GLUCOPHAGE-XR) 750 mg 24 hr tablet Take 1 tablet (750 mg total) by mouth daily with breakfast, Starting Thu 4/4/2024, Normal      methylPREDNISolone 4 MG tablet therapy pack Use as directed on package, Normal      naltrexone (REVIA) 50 mg tablet TAKE 1 TABLET BY MOUTH DAILY, Starting Tue 8/6/2024, Normal      naproxen (Naprosyn) 500 mg tablet Take 1 tablet (500 mg total) by mouth every 12 (twelve) hours as needed (pain) for up to 10 days Take with food., Starting Wed 7/10/2024, Until Thu 7/25/2024 at 2359, Normal      nitrofurantoin (MACROBID) 100 mg capsule Historical Med      OXcarbazepine (TRILEPTAL) 300 mg tablet Take 300 mg by mouth every 12 (twelve) hours, Starting Fri 2/2/2024, Historical Med      oxybutynin (DITROPAN) 5 mg tablet Take 1 tablet (5 mg total) by mouth 2 (two) times a day, Starting Thu 4/4/2024, Until Fri 8/2/2024, Normal      paliperidone palmitate ER (INVEGA) 234 mg/1.5 mL IM injection 1.5 mL (234 mg total) by IM- Deltoid route every 4 weeks Do not start before February 28, 2024., Starting Wed 2/28/2024, No Print      pantoprazole (PROTONIX) 40 mg tablet Take 1 tablet (40 mg total) by mouth daily in the early morning, Starting Thu 4/4/2024, Normal      pilocarpine (SALAGEN) 5 mg tablet TAKE 1 TABLET (5 MG TOTAL) BY MOUTH THREE (THREE) TIMES A DAY, Normal       topiramate (TOPAMAX) 200 MG tablet Take 1 tablet (200 mg total) by mouth 2 (two) times a day, Starting Tue 1/30/2024, Until Thu 7/25/2024, Normal      traZODone (DESYREL) 100 mg tablet Historical Med      trospium chloride (SANCTURA) 20 mg tablet Take 20 mg by mouth 2 (two) times a day, Starting Mon 7/8/2024, Until Tue 7/8/2025, Historical Med      !! warfarin (COUMADIN) 2 mg tablet take 3 & 1/2 tablets (7MG) once a day Saturday and Sunday and take 2 & 1/2 tablets (5MG) once a day Monday through Friday, Normal      !! warfarin (COUMADIN) 5 mg tablet Take 1 tablet (5 mg total) by mouth daily, Starting Wed 7/24/2024, Until Mon 1/20/2025, Normal       !! - Potential duplicate medications found. Please discuss with provider.          No discharge procedures on file.    PDMP Review         Value Time User    PDMP Reviewed  Yes 11/10/2023  2:54 PM JHONATAN Anne            ED Provider  Electronically Signed by             Mart Meeks MD  08/14/24 [id number]

## 2024-08-14 NOTE — TELEPHONE ENCOUNTER
Spoke with patient regarding her INR result. I informed the patient that her INR is extremely high and she is at a very high risk of bleeding. I informed her that she needs to go to the ED. Patient states that she is at the doctors right now near [facility], but as soon as her visit is over, she will go to the [facility]. Patient verbalized understanding and is in agreement with the plan.

## 2024-08-14 NOTE — TELEPHONE ENCOUNTER
Regarding: critical labs  ----- Message from Malissa DRAKE sent at 8/14/2024  1:28 PM EDT -----  Rebekah called to give critical results on pt

## 2024-08-14 NOTE — DISCHARGE INSTRUCTIONS
You were evaluated in the emergency department for: abnormal lab. You can access your current and pending results through [facility]. A radiologist will take a second look at your X-Rays, if you had any, and you will be contacted with any new findings.     You should follow-up with your primary care provider, as soon as possible, for re-evaluation.  If you do not have a primary care provider, I have referred you to [facility]. You will be contacted about scheduling an appointment. Their phone number is also included on this paperwork.     Your workup revealed no emergent features at this time; however, many disease processes are dynamic:    Please, return to the emergency department if you experience new or worsening symptoms, fever, chest pain, shortness of breath, difficulty breathing, dizziness, abdominal pain, persistent nausea/vomiting, syncope or passing out, blood in your urine or stool, coughing up blood, leg swelling/pain, urinary retention, bowel or bladder incontinence, numbness between your legs.    Additionally, your blood pressure was measured to be high. This is something that you should discuss with your primary care provider and have re-checked within one week.      You should not take your next dose of warfarin.  I recommend that you call your hematologist oncologist in the morning tomorrow and discuss if you need to resume your warfarin at a reduced dose.  If you develop bleeding you should return immediately to the emergency department.

## 2024-08-14 NOTE — TELEPHONE ENCOUNTER
Called lab to return their call with critical lab results. Warm transferred call to SALEEM Mcintyre RN, [facility] to take results per protocol.

## 2024-08-14 NOTE — TELEPHONE ENCOUNTER
Spoke with the lab. Patient has critical lab result of INR 6.11 and PT 54.2. I will make JHONATAN Romero aware.

## 2024-08-14 NOTE — TELEPHONE ENCOUNTER
Pt called to confirm if mobile labs will be coming to her house or if they come to pick her up to drop her off a lab. Confirmed with patient that they will come to her house at 10 am today. Advised pt to look up schedule on [place] [facility] for future scheduled appt reference. Pt had no further questions at this time.

## 2024-08-14 NOTE — TELEPHONE ENCOUNTER
"Additional Information  • Negative: Information only question and nurse able to answer    Answer Assessment - Initial Assessment Questions  1. REASON FOR CALL or QUESTION: \"What is your reason for calling today?\" or \"How can I best help you?\" or \"What question do you have that I can help answer?\"      NO TRIAGE - CALL RETURNED TO LAB FOR CRITICAL LAB RESULT    Protocols used: Information Only Call - No Triage-ADULT-[place]    "

## 2024-08-15 ENCOUNTER — TELEPHONE (OUTPATIENT)
Age: 54
End: 2024-08-15

## 2024-08-15 NOTE — TELEPHONE ENCOUNTER
Spoke with Blanca, informed her to stop the Lovenox.  She did not take Coumadin last night, she will hold it today and tomorrow and restart on Saturday and 5 mg daily.  In addition, patient will stop Lovenox.  She will have an INR check through mobile labs on Wednesday.  Patient repeated these instructions and is agreeable with the plan.

## 2024-08-15 NOTE — TELEPHONE ENCOUNTER
Call received from patient. Went to ED yesterday as directed by Heather ISRAEL. In ED, they adam her blood work and sent patient home. Instructed her to take lovenox injection last night and this morning which she did, and held her warfarin last night. Requesting call back on next steps.

## 2024-08-15 NOTE — TELEPHONE ENCOUNTER
Call received from patient. Stated from her lovenox injection site there is a small bump under the skin on L side of her belly button. Informed patient that this is normal and can happen from receiving this injection and should resolve after a few days. Instructed patient to call back if it worsens or does not go away. Verbalized understanding.

## 2024-08-16 ENCOUNTER — OFFICE VISIT (OUTPATIENT)
Dept: PHYSICAL THERAPY | Age: 54
End: 2024-08-16
Payer: COMMERCIAL

## 2024-08-16 DIAGNOSIS — M75.32 CALCIFIC TENDONITIS OF LEFT SHOULDER: Primary | ICD-10-CM

## 2024-08-16 PROCEDURE — 97140 MANUAL THERAPY 1/> REGIONS: CPT

## 2024-08-16 PROCEDURE — 97112 NEUROMUSCULAR REEDUCATION: CPT

## 2024-08-16 PROCEDURE — 97110 THERAPEUTIC EXERCISES: CPT

## 2024-08-16 NOTE — PROGRESS NOTES
Daily Note     Today's date: 2024  Patient name: Blanca Mason  : 1970  MRN: [id number]  Referring provider: Ann Marie Ge DO  Dx:   Encounter Diagnosis     ICD-10-CM    1. Calcific tendonitis of left shoulder  M75.32                      Subjective: Pt is increasing her left sh rom to date with daily activity per her report.      Objective: See treatment diary below      Assessment: Tolerated treatment well. Patient would benefit from continued PT      Plan: Continue per plan of care.         2.    3.   8 4.    5.    6.      7.    8.    9.    10.    11.    12.      13.    14.    15.    16.  17.  18.      19.    20.   21.   22.   23.   24.      25.    26. 27. 28. 29. 30.   31. 32.  33. 34. 35. 36.        Daily Treatment Diary     Manuals         PROM L shoulder  man  Man 15min and 15 min cross friction massage bicep tendon long head and short head Man 15min         Gilbert taping left sh for relocation of head of left humerus  Man 15 min  removed  D/c                     Therapeutic Exercise             Pulleys     5 min 5 min        Table slides- flex/scap :03x 10 flex 3 x 10 flex abd scap 3 x 10 flex , abd scap 3 x 10flex and abd 3 x 10         Scapular retraction 10x 10 x  2 x 10 2 x 10 2 x 10        Doorway pec stretch             Cane AAROM flexion/ ER ,  abd, ext , int rot standing or sitting      10 reps                                   Shoulder isometrics   Left sh 1 set of 10 hold 5 sec Left10 reps 5 sec hold sh  Left 10 reps  5 sec hold        Sidelying ER   10 10 10 reps         Sidelying abduction     10 reps         Sidelying flexion             TB rows, sh ext     Red 3 sets of 10         TB LPD             Wall slides sh flex and abd     10 reps each                                                            Modalities US 1.2 w/cm2 cont, zynex e stim IF  10 min us,  15 min estim IF while perf exer  10 min us,   15 min e stim           CP PRN

## 2024-08-19 ENCOUNTER — OFFICE VISIT (OUTPATIENT)
Dept: PHYSICAL THERAPY | Age: 54
End: 2024-08-19
Payer: COMMERCIAL

## 2024-08-19 DIAGNOSIS — M75.32 CALCIFIC TENDONITIS OF LEFT SHOULDER: Primary | ICD-10-CM

## 2024-08-19 PROCEDURE — 97110 THERAPEUTIC EXERCISES: CPT

## 2024-08-19 NOTE — PROGRESS NOTES
Daily Note     Today's date: 2024  Patient name: Blanca Mason  : 1970  MRN: [id number]  Referring provider: Ann Marie Ge DO  Dx:   Encounter Diagnosis     ICD-10-CM    1. Calcific tendonitis of left shoulder  M75.32                      Subjective: PT c/o left sh was painful sat and sun and this am, however pt is taking less pain med for her right foot and this may be impacting her c/o's       Objective: See treatment diary below      Assessment: Tolerated treatment fair. Patient demonstrated fatigue post treatment, estim  IF combo utilized for pain control left today       Plan: Continue per plan of care.         2.    3.   8 4.   8 5.    6.      7.    8.    9.    10.    11.    12.      13.    14.    15.    16.  17.  18.      19.    20.   21.   22.   23.   24.      25.    26. 27. 28. 29. 30.   31. 32.  33. 34. 35. 36.        Daily Treatment Diary     Manuals        PROM L shoulder  man  Man 15min and 15 min cross friction massage bicep tendon long head and short head Man 15min         Vladimir taping left sh for relocation of head of left humerus  Man 15 min  removed  D/c                     Therapeutic Exercise             Pulleys     5 min 5 min  5min       Table slides- flex/scap :03x 10 flex 3 x 10 flex abd scap 3 x 10 flex , abd scap 3 x 10flex and abd 3 x 10  3 x 10       Scapular retraction 10x 10 x  2 x 10 2 x 10 2 x 10 2 x 10       Doorway pec stretch             Cane AAROM flexion/ ER ,  abd, ext , int rot standing or sitting      10 reps  10 reps                                  Shoulder isometrics   Left sh 1 set of 10 hold 5 sec Left10 reps 5 sec hold sh  Left 10 reps  5 sec hold Left 10 reps        Sidelying ER   10 10 10 reps         Sidelying abduction     10 reps         Sidelying flexion             TB rows, sh ext     Red 3 sets of 10         TB LPD             Wall slides sh flex and abd     10 reps each 3 x 10       Sh   stretch wedge sh flex ext rot      10 reps 10 sec hold                                              Modalities US 1.2 w/cm2 cont, zynex e stim IF  10 min us,  15 min estim IF while perf exer  10 min us,   15 min e stim    E stim 30 min       CP PRN

## 2024-08-21 ENCOUNTER — TELEPHONE (OUTPATIENT)
Dept: HEMATOLOGY ONCOLOGY | Facility: CLINIC | Age: 54
End: 2024-08-21

## 2024-08-21 ENCOUNTER — APPOINTMENT (OUTPATIENT)
Dept: LAB | Facility: HOSPITAL | Age: 54
End: 2024-08-21
Payer: COMMERCIAL

## 2024-08-21 DIAGNOSIS — Z86.711 HISTORY OF PULMONARY EMBOLISM: ICD-10-CM

## 2024-08-21 DIAGNOSIS — Z79.01 ANTICOAGULATION MANAGEMENT ENCOUNTER: ICD-10-CM

## 2024-08-21 DIAGNOSIS — Z86.718 HISTORY OF DVT (DEEP VEIN THROMBOSIS): ICD-10-CM

## 2024-08-21 DIAGNOSIS — R05.2 SUBACUTE COUGH: ICD-10-CM

## 2024-08-21 DIAGNOSIS — Z51.81 ANTICOAGULATION MANAGEMENT ENCOUNTER: ICD-10-CM

## 2024-08-21 LAB
INR PPP: 3.1 (ref 0.85–1.19)
PROTHROMBIN TIME: 32.5 SECONDS (ref 12.3–15)

## 2024-08-21 PROCEDURE — 36415 COLL VENOUS BLD VENIPUNCTURE: CPT

## 2024-08-21 PROCEDURE — 85610 PROTHROMBIN TIME: CPT

## 2024-08-21 RX ORDER — LEVOCETIRIZINE DIHYDROCHLORIDE 5 MG/1
5 TABLET, FILM COATED ORAL EVERY EVENING
Qty: 90 TABLET | Refills: 1 | Status: SHIPPED | OUTPATIENT
Start: 2024-08-21

## 2024-08-21 NOTE — TELEPHONE ENCOUNTER
Spoke with Blanca and informed her that her INR is 3.1.  I would like her to hold her Coumadin 5 mg tonight and restart at 5 mg on Friday and continue until next INR check next week.  Patient voiced understanding.

## 2024-08-22 ENCOUNTER — TELEPHONE (OUTPATIENT)
Dept: NEUROLOGY | Facility: CLINIC | Age: 54
End: 2024-08-22

## 2024-08-22 ENCOUNTER — OFFICE VISIT (OUTPATIENT)
Dept: PHYSICAL THERAPY | Age: 54
End: 2024-08-22
Payer: COMMERCIAL

## 2024-08-22 ENCOUNTER — PROCEDURE VISIT (OUTPATIENT)
Dept: NEUROLOGY | Facility: CLINIC | Age: 54
End: 2024-08-22
Payer: COMMERCIAL

## 2024-08-22 VITALS — DIASTOLIC BLOOD PRESSURE: 80 MMHG | TEMPERATURE: 97.9 F | SYSTOLIC BLOOD PRESSURE: 134 MMHG | HEART RATE: 88 BPM

## 2024-08-22 DIAGNOSIS — G43.709 CHRONIC MIGRAINE WITHOUT AURA WITHOUT STATUS MIGRAINOSUS, NOT INTRACTABLE: Primary | ICD-10-CM

## 2024-08-22 DIAGNOSIS — M75.32 CALCIFIC TENDONITIS OF LEFT SHOULDER: Primary | ICD-10-CM

## 2024-08-22 PROCEDURE — 97110 THERAPEUTIC EXERCISES: CPT

## 2024-08-22 PROCEDURE — 64615 CHEMODENERV MUSC MIGRAINE: CPT | Performed by: PHYSICIAN ASSISTANT

## 2024-08-22 NOTE — TELEPHONE ENCOUNTER
Called to r/s her appt as dr Brady is not in office called and left very detail message to r/s with her directly

## 2024-08-22 NOTE — PROGRESS NOTES
"Universal Protocol   procedure performed by consultantConsent: Verbal consent obtained. Written consent obtained.  Risks and benefits: risks, benefits and alternatives were discussed  Consent given by: patient  Time out: Immediately prior to procedure a \"time out\" was called to verify the correct patient, procedure, equipment, support staff and site/side marked as required.  Patient understanding: patient states understanding of the procedure being performed  Patient consent: the patient's understanding of the procedure matches consent given  Procedure consent: procedure consent matches procedure scheduled  Relevant documents: relevant documents present and verified  Patient identity confirmed: verbally with patient      Chemodenervation     Date/Time  8/22/2024 9:00 AM     Performed by  Aria Toney PA-C   Authorized by  Aria Toney PA-C     Pre-procedure details      Preparation: Patient was prepped and draped in usual sterile fashion     Anesthesia  (see MAR for exact dosages):     Anesthesia method:  None   Procedure details      Position:  Upright   Botox      Botox Type:  Type A    Brand:  Botox    mL's of Botulinum Toxin:  200    mL's of preservative free sterile saline:  4    Final Concentration per CC:  50 units    Needle Gauge:  30 G 2.5 inch   Procedures      Botox Procedures: chronic headache     Injection Location      Head / Face:  L superior cervical paraspinal, R superior cervical paraspinal, L , R , R frontalis, L frontalis, R medial occipitalis, L medial occipitalis, procerus, R temporalis, L superior trapezius, R superior trapezius and L temporalis    L  injection amount:  5 unit(s)    R  injection amount:  5 unit(s)    L lateral frontalis:  5 unit(s)    R lateral frontalis:  5 unit(s)    L medial frontalis:  5 unit(s)    R medial frontalis:  5 unit(s)    L temporalis injection amount:  20 unit(s)    R temporalis injection amount:  20 unit(s)    Procerus " injection amount:  5 unit(s)    L medial occipitalis injection amount:  15 unit(s)    R medial occipitalis injection amount:  15 unit(s)    L superior cervical paraspinal injection amount:  10 unit(s)    R superior cervical paraspinal injection amount:  10 unit(s)    L superior trapezius injection amount:  15 unit(s)    R superior trapezius injection amount:  15 unit(s)   Total Units      Total units used:  200   Post-procedure details      Chemodenervation:  Chronic migraine    Facial Nerve Location::  Bilateral facial nerve    Patient tolerance of procedure:  Tolerated well, no immediate complications   Comments       10 units scm bilaterally  10 units occipitalis  15 units left splenius  All medically necessary

## 2024-08-22 NOTE — PROGRESS NOTES
"Daily Note     Today's date: 2024  Patient name: Blanca Mason  : 1970  MRN: [id number]  Referring provider: Ann Marie Ge DO  Dx:   Encounter Diagnosis     ICD-10-CM    1. Calcific tendonitis of left shoulder  M75.32                      Subjective: \"My left sh is better today because I rested it.\" [facility] PT supervising units utilized      Objective: See treatment diary below      Assessment: Tolerated treatment well. Patient would benefit from continued PT      Plan: Continue per plan of care.         2.    3.   8 4.    5.   8 6.      7.    8.    9.    10.    11.    12.      13.    14.    15.    16.  17.  18.      19.    20.   21.   22.   23.   24.      25.    26. 27. 28. 29. 30.   31. 32.  33. 34. 35. 36.        Daily Treatment Diary     Manuals       PROM L shoulder  man  Man 15min and 15 min cross friction massage bicep tendon long head and short head Man 15min   Man 15 min      Gilbert taping left sh for relocation of head of left humerus  Man 15 min  removed  D/c                     Therapeutic Exercise             Pulleys     5 min 5 min  5min  5min      Table slides- flex/scap :03x 10 flex 3 x 10 flex abd scap 3 x 10 flex , abd scap 3 x 10flex and abd 3 x 10  3 x 10 3 x 10      Scapular retraction 10x 10 x  2 x 10 2 x 10 2 x 10 2 x 10 20 reps       Doorway pec stretch       5 reps 20 sec hold      Cane AAROM flexion/ ER ,  abd, ext , int rot standing or sitting      10 reps  10 reps                                  Shoulder isometrics   Left sh 1 set of 10 hold 5 sec Left10 reps 5 sec hold sh  Left 10 reps  5 sec hold Left 10 reps  10 reps left      Sidelying ER   10 10 10 reps         Sidelying abduction     10 reps         Sidelying flexion             TB rows, sh ext     Red 3 sets of 10         TB LPD             Wall slides sh flex and abd     10 reps each 3 x 10 3 x 10      Sh  stretch wedge sh flex ext rot      10 reps 10 " sec hold       Ube alt  2min fwd, bwd       10 min                                Modalities US 1.2 w/cm2 cont, zynex e stim IF  10 min us,  15 min estim IF while perf exer  10 min us,   15 min e stim    E stim 30 min  E stim 30 min      CP PRN

## 2024-08-26 ENCOUNTER — OFFICE VISIT (OUTPATIENT)
Dept: PHYSICAL THERAPY | Age: 54
End: 2024-08-26
Payer: COMMERCIAL

## 2024-08-26 DIAGNOSIS — M75.32 CALCIFIC TENDONITIS OF LEFT SHOULDER: Primary | ICD-10-CM

## 2024-08-26 PROCEDURE — 97140 MANUAL THERAPY 1/> REGIONS: CPT

## 2024-08-26 PROCEDURE — 97110 THERAPEUTIC EXERCISES: CPT

## 2024-08-26 NOTE — PROGRESS NOTES
"Daily Note / reevaluation     Today's date: 2024  Patient name: Blanca Mason  : 1970  MRN: [id number]  Referring provider: Ann Marie Ge DO  Dx:   Encounter Diagnosis     ICD-10-CM    1. Calcific tendonitis of left shoulder  M75.32                      Subjective: \"I see the ortho doctor on Wednesday for my left shoulder.\"      Objective: See treatment diary below.      Assessment: Blanca Mason had made progress with increased left sh rom and mmt however post reduction in pain medication for her right foot surgery return of pain in left sh bicep and subdeltoid bursa. Excessive leaning on her left shoulder with sitting observed and reported due to her furniture arrangement at home.   Left sh arom 100  prom 130 c/o pain,,  abd 95 degrees arom  prom 120 pain   Left sh mmt   flex , abd 3+/5 .   Recommend consideration for additional medical intervention for possible sh impingement syndrome. Pt to discuss with her doctor at this time.       Plan: Continue per plan of care.         2.    3.    4.    5.    6.   8   7.    8.    9.    10.    11.    12.      13.    14.    15.    16.  17.  18.      19.    20.   21.   22.   23.   24.      25.    26. 27. 28. 29. 30.   31. 32.  33. 34. 35. 36.        Daily Treatment Diary     Manuals      PROM L shoulder  man  Man 15min and 15 min cross friction massage bicep tendon long head and short head Man 15min   Man 15 min Man 15min prom joint mobs grade 2 flex abd, distraction also pect minor stretching by PT     Vladimir taping left sh for relocation of head of left humerus  Man 15 min  removed  D/c                     Therapeutic Exercise             Pulleys     5 min 5 min  5min  5min 5 min      Table slides- flex/scap :03x 10 flex 3 x 10 flex abd scap 3 x 10 flex , abd scap 3 x 10flex and abd 3 x 10  3 x 10 3 x 10 3 x 10     Scapular retraction 10x 10 x  2 x 10 2 x 10 2 x 10 2 x 10 20 reps     "   Doorway pec stretch       5 reps 20 sec hold 5 reps 20 sec hold      Cane AAROM flexion/ ER ,  abd, ext , int rot standing or sitting      10 reps  10 reps   10 reps                                Shoulder isometrics   Left sh 1 set of 10 hold 5 sec Left10 reps 5 sec hold sh  Left 10 reps  5 sec hold Left 10 reps  10 reps left 10 reps      Sidelying ER   10 10 10 reps    2# 3 x 10     Sidelying abduction     10 reps    1 set of 10      Sidelying flexion             TB rows, sh ext     Red 3 sets of 10         TB LPD             Wall slides sh flex and abd     10 reps each 3 x 10 3 x 10 1 set of 10     Sh  stretch wedge sh flex ext rot      10 reps 10 sec hold  Man stretch by PT     Ube alt  2min fwd, bwd       10 min 10 min                               Modalities US 1.2 w/cm2 cont, zynex e stim IF  10 min us,  15 min estim IF while perf exer  10 min us,   15 min e stim    E stim 30 min  E stim 30 min      CP PRN

## 2024-08-27 ENCOUNTER — TELEPHONE (OUTPATIENT)
Dept: OBGYN CLINIC | Facility: CLINIC | Age: 54
End: 2024-08-27

## 2024-08-28 ENCOUNTER — APPOINTMENT (OUTPATIENT)
Dept: LAB | Facility: HOSPITAL | Age: 54
End: 2024-08-28
Payer: COMMERCIAL

## 2024-08-28 ENCOUNTER — TELEPHONE (OUTPATIENT)
Dept: HEMATOLOGY ONCOLOGY | Facility: CLINIC | Age: 54
End: 2024-08-28

## 2024-08-28 ENCOUNTER — OFFICE VISIT (OUTPATIENT)
Dept: OBGYN CLINIC | Facility: CLINIC | Age: 54
End: 2024-08-28
Payer: COMMERCIAL

## 2024-08-28 VITALS
HEART RATE: 69 BPM | WEIGHT: 293 LBS | HEIGHT: 66 IN | DIASTOLIC BLOOD PRESSURE: 80 MMHG | BODY MASS INDEX: 47.09 KG/M2 | SYSTOLIC BLOOD PRESSURE: 136 MMHG

## 2024-08-28 DIAGNOSIS — Z79.01 ANTICOAGULATION MANAGEMENT ENCOUNTER: ICD-10-CM

## 2024-08-28 DIAGNOSIS — Z86.711 HISTORY OF PULMONARY EMBOLISM: ICD-10-CM

## 2024-08-28 DIAGNOSIS — M75.32 CALCIFIC TENDONITIS OF LEFT SHOULDER: Primary | ICD-10-CM

## 2024-08-28 DIAGNOSIS — Z86.718 HISTORY OF DVT (DEEP VEIN THROMBOSIS): ICD-10-CM

## 2024-08-28 DIAGNOSIS — Z51.81 ANTICOAGULATION MANAGEMENT ENCOUNTER: ICD-10-CM

## 2024-08-28 LAB
INR PPP: 3.2 (ref 0.85–1.19)
PROTHROMBIN TIME: 33.4 SECONDS (ref 12.3–15)

## 2024-08-28 PROCEDURE — 99213 OFFICE O/P EST LOW 20 MIN: CPT | Performed by: ORTHOPAEDIC SURGERY

## 2024-08-28 PROCEDURE — 20610 DRAIN/INJ JOINT/BURSA W/O US: CPT | Performed by: ORTHOPAEDIC SURGERY

## 2024-08-28 PROCEDURE — 36415 COLL VENOUS BLD VENIPUNCTURE: CPT

## 2024-08-28 PROCEDURE — 85610 PROTHROMBIN TIME: CPT

## 2024-08-28 RX ORDER — TRIAMCINOLONE ACETONIDE 40 MG/ML
40 INJECTION, SUSPENSION INTRA-ARTICULAR; INTRAMUSCULAR
Status: COMPLETED | OUTPATIENT
Start: 2024-08-28 | End: 2024-08-28

## 2024-08-28 RX ADMIN — TRIAMCINOLONE ACETONIDE 40 MG: 40 INJECTION, SUSPENSION INTRA-ARTICULAR; INTRAMUSCULAR at 09:00

## 2024-08-28 NOTE — TELEPHONE ENCOUNTER
Spoke with patient, informed her the INR is 3.2  Patient denies abnormal bleeding: epistaxis, gingival bleeding, hematuria, dark tarry stools. Recommend she hold coumadin 5 mg, which she is currently taking today, tomorrow and Friday.  Resume Saturday with 5 mg daily.  Recheck INR next week.

## 2024-08-28 NOTE — PROGRESS NOTES
"[facility] [address] [address] [phone]       Blanca Mason  [id number]  1970    ORTHOPAEDIC SURGERY OUTPATIENT NOTE  8/28/2024      HISTORY:  53 y.o. female presents for follow-up left shoulder calcific tendinitis.  Patient did receive a Medrol Dosepak and formal physical therapy at her last visit.  She states she has only minimal improvement with formal physical therapy.  She still complains of pain in her left shoulder.  She wishes to discuss further management.    Past Medical History:   Diagnosis Date   • Acute deep vein thrombosis of lower leg, left (Formerly [facility]) 10/16/2023   • Acute heart failure, unspecified heart failure type (Formerly [facility]) 05/06/2024   • Anxiety    • Arthritis     oa cassandra knees   • Asthma     good control- no medications   • Yan's esophagus    • Bipolar affective disorder (Formerly [facility])    • Cannabis abuse with unspecified 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Formerly [facility])    • Chronic pain disorder     lumbar   • Contusion of elbow 12/21/2021   • COPD (chronic obstructive pulmonary disease) (Formerly [facility])    • CPAP (continuous positive airway pressure) dependence    • DDD (degenerative disc disease), lumbar 04/09/2015   • Degenerative disc disease at L5-S1 level    • Deliberate self-cutting     9/15/22per pt has not done recently-- does see a therapist and psychiatrist regularly   • Depression 09/16/2008   • Diarrhea 01/06/2022   • Disease of thyroid gland     hypo   • MARTINEZ (dyspnea on exertion)     per pt \"has had this with exertion and not new\"   • Drug overdose 10/28/2008    suicide attempt and again drug overdose 7/2022--[facility]Medical floor and than [facility] [place] [facility]   • Dysphagia    • Dyspnea    • Ecchymosis 01/06/2022   • Edema     BLE   • Elevated troponin 09/02/2023   • Family history of blood clots    • GERD (gastroesophageal reflux disease)    • Grief 11/11/2023   • Headache(784.0)    • Headache, tension-type    • " Hemorrhage of rectum and anus 10/02/2017    Formatting of this note might be different from the original.  Added automatically from request for surgery [id number]   • High blood pressure    • History of COVID-19 12/30/2020    Symptoms started on 12/30/2020 and then got worse.  Today she is feeling a little bit better.  She is a candidate for monoclonal antibody infusion and set for 1/6/21 @ 1pm at [facility]. 01/07/21 - telemedicine -    • Hyperlipidemia    • Hypertension    • Intentional overdose (Formerly [facility]) 09/27/2023   • Intentional self-harm by unspecified sharp object, sequela (Formerly [facility]) 02/09/2023   • Knee pain, bilateral     Especially right   • Knee pain, right 02/23/2022   • Medial epicondylitis of elbow, left 04/03/2018    Formatting of this note might be different from the original.  Added automatically from request for surgery [id number]   • Medial epicondylitis of right elbow 07/17/2023   • Medical marijuana use    • Memory difficulties 08/06/2020   • Memory loss    • Migraines    • Obesity    • Other psychoactive substance abuse with unspecified psychoactive substance-induced disorder (Formerly [facility]) 05/06/2024   • Overactive bladder    • Polysubstance abuse (Formerly [facility]) 09/29/2023   • Potential for cognitive impairment 06/17/2021   • Primary osteoarthritis of both knees 08/08/2018   • Pulmonary emboli (Formerly [facility])    • Restrictive lung disease 02/01/2017    Last Assessment & Plan:   Formatting of this note might be different from the original.  I reviewed this problem throughout the rest of the note. Please refer to the other assessments for details.   • Sjogren's disease (Formerly [facility])    • Sleep apnea    • Stress incontinence    • Suicidal deliberate poisoning (Formerly [facility]) 07/13/2022   • Suicidal ideations    • Teeth missing    • Toxic encephalopathy 11/08/2023   • Tremor     per pt Tremors of Right Leg and both Arms   • Visual impairment    • Wears glasses        Past Surgical History:   Procedure Laterality Date   • BREAST CYST EXCISION Right 1989    • CARPAL TUNNEL RELEASE Left    • CHOLECYSTECTOMY  05/2003    Laparoscopic   • COLONOSCOPY      01/12/2009   • DILATION AND CURETTAGE OF UTERUS     • ELBOW SURGERY Left     x2. No hardware   • ESOPHAGOGASTRODUODENOSCOPY     • FOOT SURGERY Left     Plantar fasciotomy   • HERNIA REPAIR     • HYSTERECTOMY      laporoscopic, partial   • KNEE ARTHROSCOPY Bilateral    • OOPHORECTOMY Left 10/2015   • MI CORRJ HLX VLGS BNCTY SESMDC JOINT ARTHRODESIS Right 8/2/2024    Procedure: RIGHT FOOT LAPIDUS BUNIONECTOMY, 2ND HAMMERTOE REPAIR, SECOND METATARSAL OSTEOTOMY WITH SECOND PLANTAR PLATE REPAIR;  Surgeon: Kaz Bautista DPM;  Location:  MAIN OR;  Service: Podiatry   • MI GASTROCNEMIUS RECESSION Left 02/24/2021    Procedure: RECESSION GASTROC OPEN;  Surgeon: Nomi Yang DPM;  Location:  MAIN OR;  Service: Podiatry   • MI RPR UMBILICAL HRNA 5 YRS/> REDUCIBLE N/A 04/24/2019    Procedure: REPAIR HERNIA UMBILICAL LAPAROSCOPIC W/ ROBOTICS;  Surgeon: Anahi Colon MD;  Location: [facility] Main OR;  Service: General   • MI SPHINCTEROTOMY ANAL DIVISION SPHINCTER SPX N/A 08/31/2018    Procedure: EUA, LEFT PARTIAL INTERNAL SPHINCTEROTOMY;  Surgeon: Tien Reyes MD;  Location:  MAIN OR;  Service: Colorectal   • MI TNOT ELBOW LATERAL/MEDIAL DEBRIDE OPEN TDN RPR Left 09/20/2022    Procedure: RELEASE EPICONDYLAR ELBOW MEDIAL;  Surgeon: Kyree Winkler MD;  Location: [facility] OR;  Service: Orthopedics   • REDUCTION MAMMAPLASTY Bilateral 1999   • REPAIR LACERATION Left     left hand-5/18/2009   • REPLACEMENT TOTAL KNEE Right    • ROTATOR CUFF REPAIR Left    • TONSILECTOMY AND ADNOIDECTOMY     • US GUIDED MSK PROCEDURE  04/22/2021   • VASCULAR SURGERY     • VEIN LIGATION Bilateral    • WISDOM TOOTH EXTRACTION         Social History     Socioeconomic History   • Marital status: Single     Spouse name: Not on file   • Number of children: Not on file   • Years of education: Not on file   • Highest education level: Not on  file   Occupational History   • Not on file   Tobacco Use   • Smoking status: Former     Current packs/day: 0.00     Average packs/day: 2.0 packs/day for 33.0 years (66.0 ttl pk-yrs)     Types: Cigarettes     Start date: 1985     Quit date: 2018     Years since quittin.6   • Smokeless tobacco: Never   • Tobacco comments:     Started at age 16.   Vaping Use   • Vaping status: Some Days   • Substances: THC   Substance and Sexual Activity   • Alcohol use: Not Currently     Comment: Recovering alcoholic   • Drug use: Yes     Types: Marijuana     Comment: Former meth use, medicinal marijuana   • Sexual activity: Not Currently     Partners: Male     Comment: defer   Other Topics Concern   • Not on file   Social History Narrative   • Not on file     Social Determinants of Health     Financial Resource Strain: Low Risk  (2024)    Overall Financial Resource Strain (CARDIA)    • Difficulty of Paying Living Expenses: Not hard at all   Food Insecurity: No Food Insecurity (2024)    Hunger Vital Sign    • Worried About Running Out of Food in the Last Year: Never true    • Ran Out of Food in the Last Year: Never true   Transportation Needs: No Transportation Needs (2024)    PRAPARE - Transportation    • Lack of Transportation (Medical): No    • Lack of Transportation (Non-Medical): No   Physical Activity: Not on file   Stress: Not on file   Social Connections: Not on file   Intimate Partner Violence: Not on file   Housing Stability: Unknown (2024)    Housing Stability Vital Sign    • Unable to Pay for Housing in the Last Year: No    • Number of Times Moved in the Last Year: Not on file    • Homeless in the Last Year: No       Family History   Problem Relation Age of Onset   • Kidney cancer Mother    • Diabetes Mother    • Depression Mother    • Stroke Mother    • Arthritis Mother    • Cancer Mother    • Psychiatric Illness Mother    • No Known Problems Father    • No Known Problems Sister    • No Known  Problems Maternal Grandmother    • No Known Problems Maternal Grandfather    • No Known Problems Paternal Grandmother    • No Known Problems Paternal Grandfather    • Colon cancer Maternal Uncle    • Colon cancer Maternal Uncle    • Colon cancer Paternal Uncle    • Colon cancer Family    • Alcohol abuse Sister    • Asthma Sister         Patient's Medications   New Prescriptions    No medications on file   Previous Medications    ATOMOXETINE (STRATTERA) 40 MG CAPSULE    Take 1 capsule (40 mg total) by mouth daily    ATOMOXETINE (STRATTERA) 60 MG CAPSULE    Take 60 mg by mouth daily    AUSTEDO XR 24 MG TB24        BENZTROPINE (COGENTIN) 1 MG TABLET        BUPROPION (FORFIVO XL) 450 MG 24 HR TABLET    Take 1 tablet (450 mg total) by mouth daily    BUPROPION (WELLBUTRIN XL) 150 MG 24 HR TABLET        CHOLECALCIFEROL (VITAMIN D3) 125 MCG (5000 UT) CAPSULE    Take 1 capsule (5,000 Units total) by mouth daily    CLONIDINE (CATAPRES) 0.2 MG TABLET    TAKE 1 TABLET (0.2 MG TOTAL) BY MOUTH EVERY 12 (TWELVE) HOURS    CYANOCOBALAMIN (VITAMIN B-12) 1000 MCG TABLET    Take 1 tablet (1,000 mcg total) by mouth daily    ENOXAPARIN (LOVENOX) 150 MG/ML INJECTION    Inject 0.9 mL (135 mg total) under the skin every 12 (twelve) hours for 10 days Start with your coumadin post surgery as discussed    FUROSEMIDE (LASIX) 20 MG TABLET    Take 1 tablet (20 mg total) by mouth daily    LEVOCETIRIZINE (XYZAL) 5 MG TABLET    TAKE ONE TABLET BY MOUTH IN THE EVENING DAILY    LEVOTHYROXINE (EUTHYROX) 125 MCG TABLET    Take 1 tablet (125 mcg total) by mouth daily in the early morning    LIDOCAINE (LIDODERM) 5 %    Apply 1 patch topically over 12 hours daily Remove & Discard patch within 12 hours or as directed by MD    LOSARTAN (COZAAR) 100 MG TABLET    Take 1 tablet (100 mg total) by mouth daily    LURASIDONE (LATUDA) 40 MG TABLET        METFORMIN (GLUCOPHAGE-XR) 750 MG 24 HR TABLET    Take 1 tablet (750 mg total) by mouth daily with breakfast     "METHYLPREDNISOLONE 4 MG TABLET THERAPY PACK    Use as directed on package    NALTREXONE (REVIA) 50 MG TABLET    TAKE 1 TABLET BY MOUTH DAILY    NAPROXEN (NAPROSYN) 500 MG TABLET    Take 1 tablet (500 mg total) by mouth every 12 (twelve) hours as needed (pain) for up to 10 days Take with food.    NITROFURANTOIN (MACROBID) 100 MG CAPSULE        OXCARBAZEPINE (TRILEPTAL) 150 MG TABLET    Take 3 tablets (450 mg total) by mouth every 12 (twelve) hours    OXCARBAZEPINE (TRILEPTAL) 300 MG TABLET    Take 300 mg by mouth every 12 (twelve) hours    OXYBUTYNIN (DITROPAN) 5 MG TABLET    Take 1 tablet (5 mg total) by mouth 2 (two) times a day    PALIPERIDONE PALMITATE ER (INVEGA) 234 MG/1.5 ML IM INJECTION    1.5 mL (234 mg total) by IM- Deltoid route every 4 weeks Do not start before February 28, 2024.    PANTOPRAZOLE (PROTONIX) 40 MG TABLET    Take 1 tablet (40 mg total) by mouth daily in the early morning    PILOCARPINE (SALAGEN) 5 MG TABLET    TAKE 1 TABLET (5 MG TOTAL) BY MOUTH THREE (THREE) TIMES A DAY    TOPIRAMATE (TOPAMAX) 200 MG TABLET    Take 1 tablet (200 mg total) by mouth 2 (two) times a day    TRAZODONE (DESYREL) 100 MG TABLET        TROSPIUM CHLORIDE (SANCTURA) 20 MG TABLET    Take 20 mg by mouth 2 (two) times a day    WARFARIN (COUMADIN) 2 MG TABLET    take 3 & 1/2 tablets (7MG) once a day Saturday and Sunday and take 2 & 1/2 tablets (5MG) once a day Monday through Friday    WARFARIN (COUMADIN) 5 MG TABLET    Take 1 tablet (5 mg total) by mouth daily   Modified Medications    No medications on file   Discontinued Medications    No medications on file       Allergies   Allergen Reactions   • Percocet [Oxycodone-Acetaminophen] Headache     Severe headaches   (denies issues with Tylenol)   • Povidone Iodine Rash     Unsure if betadine or gauze post surgical. Got rash on leg.   Has  Had itchy rashes after every surgery prep and IV insertion   • Chlorhexidine Rash     Per pt \"able to use the liquid soap--thinkd " "reaction from the sponges or wipes\"        /80 (BP Location: Right arm, Patient Position: Sitting, Cuff Size: Standard)   Pulse 69   Ht 5' 6\" (1.676 m)   Wt (!) 137 kg (303 lb)   BMI 48.91 kg/m²      REVIEW OF SYSTEMS:  Constitutional: Negative.    HEENT: Negative.    Respiratory: Negative.    Skin: Negative.    Neurological: Negative.    Psychiatric/Behavioral: Negative.  Musculoskeletal: Negative except for that mentioned in the HPI.    [unfilled]     PHYSICAL EXAM: Left shoulder: Passive 120 forward flexion  50 external rotation  50 Abduction  Left buttock internal rotation    IMAGING: No images taken office today    ASSESSMENT AND PLAN:  53 y.o. female with left shoulder rotator cuff calcific tendinitis.    At this time the patient was offered a corticosteroid injection of the left shoulder.  She agreed to this.  She is to continue physical therapy.  She can follow-up in 6 to 8 weeks for repeat clinical evaluation if no improvement.    Large joint arthrocentesis: L subacromial bursa  Universal Protocol:  Consent given by: patient  Supporting Documentation  Indications: pain   Procedure Details  Location: shoulder - L subacromial bursa  Needle size: 22 G  Approach: posterolateral  Medications administered: 40 mg triamcinolone acetonide 40 mg/mL    Patient tolerance: patient tolerated the procedure well with no immediate complications  Dressing:  Sterile dressing applied       "

## 2024-08-29 NOTE — TELEPHONE ENCOUNTER
Pt called to r/s appt due to provider not being in the office. Pt accepted next open slot on 9/16 at 4pm. Location address was confirmed.   Patient

## 2024-09-04 ENCOUNTER — OFFICE VISIT (OUTPATIENT)
Dept: PHYSICAL THERAPY | Age: 54
End: 2024-09-04
Payer: COMMERCIAL

## 2024-09-04 ENCOUNTER — LAB (OUTPATIENT)
Dept: LAB | Facility: HOSPITAL | Age: 54
End: 2024-09-04
Payer: COMMERCIAL

## 2024-09-04 ENCOUNTER — TELEPHONE (OUTPATIENT)
Dept: HEMATOLOGY ONCOLOGY | Facility: CLINIC | Age: 54
End: 2024-09-04

## 2024-09-04 DIAGNOSIS — Z86.718 HISTORY OF DVT (DEEP VEIN THROMBOSIS): ICD-10-CM

## 2024-09-04 DIAGNOSIS — Z79.01 ANTICOAGULATION MANAGEMENT ENCOUNTER: ICD-10-CM

## 2024-09-04 DIAGNOSIS — Z86.711 HISTORY OF PULMONARY EMBOLISM: ICD-10-CM

## 2024-09-04 DIAGNOSIS — M75.32 CALCIFIC TENDONITIS OF LEFT SHOULDER: Primary | ICD-10-CM

## 2024-09-04 DIAGNOSIS — Z51.81 ANTICOAGULATION MANAGEMENT ENCOUNTER: ICD-10-CM

## 2024-09-04 LAB
INR PPP: 1.71 (ref 0.85–1.19)
PROTHROMBIN TIME: 20.8 SECONDS (ref 12.3–15)

## 2024-09-04 PROCEDURE — 85610 PROTHROMBIN TIME: CPT

## 2024-09-04 PROCEDURE — 97110 THERAPEUTIC EXERCISES: CPT

## 2024-09-04 PROCEDURE — 36415 COLL VENOUS BLD VENIPUNCTURE: CPT

## 2024-09-04 NOTE — PROGRESS NOTES
"Daily Note     Today's date: 2024  Patient name: Blanca Mason  : 1970  MRN: [id number]  Referring provider: Ann Marie Ge DO  Dx:   Encounter Diagnosis     ICD-10-CM    1. Calcific tendonitis of left shoulder  M75.32                      Subjective: \"I received an injection in my left sh and it feels much better. My pain is only a 4\"      Objective: See treatment diary below      Assessment: Tolerated treatment well. Patient demonstrated fatigue post treatment      Plan: Continue per plan of care.         2.    3.   8 4.    5.   8 6.   8   7. 8   8.    9. 9/4   10.    11.    12.      13.    14.    15.    16.  17.  18.      19.    20.   21.   22.   23.   24.      25.    26. 27. 28. 29. 30.   31. 32.  33. 34. 35. 36.        Daily Treatment Diary     Manuals  9/4    PROM L shoulder  man  Man 15min and 15 min cross friction massage bicep tendon long head and short head Man 15min   Man 15 min Man 15min prom joint mobs grade 2 flex abd, distraction also pect minor stretching by PT     Vladimir taping left sh for relocation of head of left humerus  Man 15 min  removed  D/c                     Therapeutic Exercise         Left sh pendulum horiz, vertical ccw cw circles 20 reps     Pulleys     5 min 5 min  5min  5min 5 min   5min    Table slides- flex/scap :03x 10 flex 3 x 10 flex abd scap 3 x 10 flex , abd scap 3 x 10flex and abd 3 x 10  3 x 10 3 x 10 3 x 10     Scapular retraction 10x 10 x  2 x 10 2 x 10 2 x 10 2 x 10 20 reps   10    Doorway pec stretch       5 reps 20 sec hold 5 reps 20 sec hold      Cane AAROM flexion/ ER ,  abd, ext , int rot standing or sitting      10 reps  10 reps   10 reps  10 reps                              Shoulder isometrics   Left sh 1 set of 10 hold 5 sec Left10 reps 5 sec hold sh  Left 10 reps  5 sec hold Left 10 reps  10 reps left 10 reps  10 reps     Sidelying ER   10 10 10 reps    2# 3 x 10     Sidelying " abduction     10 reps    1 set of 10      Sidelying flexion             TB rows, sh ext     Red 3 sets of 10     Red 2 sets of 10     TB LPD             Wall slides sh flex and abd     10 reps each 3 x 10 3 x 10 1 set of 10 1 set of5  reps    Sh  stretch wedge sh flex ext rot      10 reps 10 sec hold  Man stretch by PT     Ube alt  2min fwd, bwd       10 min 10 min 10 min alt 2min fwd, bwd                              Modalities US 1.2 w/cm2 cont, zynex e stim IF  10 min us,  15 min estim IF while perf exer  10 min us,   15 min e stim    E stim 30 min  E stim 30 min      CP PRN

## 2024-09-04 NOTE — TELEPHONE ENCOUNTER
Spoke with Blanca, informed her that her INR is 1.71, she is currently on Coumadin 5 mg daily.  She took 5 mg this evening. Asked her to take another 5 mg tonight, then 10 mg tomorrow then back to 5 mg daily.  Recheck counts next week.  Patient voiced understanding.

## 2024-09-04 NOTE — TELEPHONE ENCOUNTER
Called and left message to review Coumadin dosing with patient.  Instructed to call back through the HOPE line or send a message through [employer].

## 2024-09-11 ENCOUNTER — OFFICE VISIT (OUTPATIENT)
Dept: PHYSICAL THERAPY | Age: 54
End: 2024-09-11
Payer: COMMERCIAL

## 2024-09-11 ENCOUNTER — APPOINTMENT (OUTPATIENT)
Dept: LAB | Facility: HOSPITAL | Age: 54
End: 2024-09-11
Payer: COMMERCIAL

## 2024-09-11 DIAGNOSIS — Z79.01 ANTICOAGULATION MANAGEMENT ENCOUNTER: ICD-10-CM

## 2024-09-11 DIAGNOSIS — Z86.711 HISTORY OF PULMONARY EMBOLISM: ICD-10-CM

## 2024-09-11 DIAGNOSIS — M75.32 CALCIFIC TENDONITIS OF LEFT SHOULDER: Primary | ICD-10-CM

## 2024-09-11 DIAGNOSIS — Z51.81 ANTICOAGULATION MANAGEMENT ENCOUNTER: ICD-10-CM

## 2024-09-11 DIAGNOSIS — Z86.718 HISTORY OF DVT (DEEP VEIN THROMBOSIS): ICD-10-CM

## 2024-09-11 LAB
INR PPP: 3.52 (ref 0.85–1.19)
PROTHROMBIN TIME: 35.8 SECONDS (ref 12.3–15)

## 2024-09-11 PROCEDURE — 36415 COLL VENOUS BLD VENIPUNCTURE: CPT

## 2024-09-11 PROCEDURE — 85610 PROTHROMBIN TIME: CPT

## 2024-09-11 PROCEDURE — 97110 THERAPEUTIC EXERCISES: CPT

## 2024-09-12 ENCOUNTER — TELEPHONE (OUTPATIENT)
Dept: NEUROLOGY | Facility: CLINIC | Age: 54
End: 2024-09-12

## 2024-09-12 NOTE — TELEPHONE ENCOUNTER
Called pt to r/s due to malcolm not being in office 10 wks LM for her to sched with malcolm directly

## 2024-09-12 NOTE — PROGRESS NOTES
"Daily Note     Today's date: 2024  Patient name: Blanca Mason  : 1970  MRN: [id number]  Referring provider: Ann Marie Ge DO  Dx:   Encounter Diagnosis     ICD-10-CM    1. Calcific tendonitis of left shoulder  M75.32                      Subjective: \"My left shoulder pain is better. The pain is about a 3 at most.\"  Pt to return to work in approx 1 week, Right ankle still requiring minimal wt bearing for two more weeks to promote further healing.       Objective: See treatment diary below      Assessment: Tolerated treatment well. Patient to continue with a home exer program at this time.       Plan: d/c of PT at this time . Pt is independent with a home exer program Pt is to reduce wt bearing on her right le to promote further healing and is to return to work in 1 week approx.        2.    3.   8 4.   8 5.   8 6.   8   7. 8   8.    9. 9/   10.    11.    12.      13.    14.    15.    16.  17.  18.      19.    20.   21.   22.   23.   24.      25.    26. 27. 28. 29. 30.   31. 32.  33. 34. 35. 36.        Daily Treatment Diary     Manuals  8 8 9   PROM L shoulder  man  Man 15min and 15 min cross friction massage bicep tendon long head and short head Man 15min   Man 15 min Man 15min prom joint mobs grade 2 flex abd, distraction also pect minor stretching by PT     Vladimir taping left sh for relocation of head of left humerus  Man 15 min  removed  D/c                     Therapeutic Exercise         Left sh pendulum horiz, vertical ccw cw circles 20 reps  20 reps each   Pulleys     5 min 5 min  5min  5min 5 min   5min  5min   Table slides- flex/scap :03x 10 flex 3 x 10 flex abd scap 3 x 10 flex , abd scap 3 x 10flex and abd 3 x 10  3 x 10 3 x 10 3 x 10  3 x 10   Scapular retraction 10x 10 x  2 x 10 2 x 10 2 x 10 2 x 10 20 reps   10 10   Doorway pec stretch       5 reps 20 sec hold 5 reps 20 sec hold      Leonid BARRETT flexion/ ER ,  " abd, ext , int rot standing or sitting      10 reps  10 reps   10 reps  10 reps                              Shoulder isometrics   Left sh 1 set of 10 hold 5 sec Left10 reps 5 sec hold sh  Left 10 reps  5 sec hold Left 10 reps  10 reps left 10 reps  10 reps  10   Sidelying ER   10 10 10 reps    2# 3 x 10     Sidelying abduction     10 reps    1 set of 10      Sidelying flexion             TB rows, sh ext     Red 3 sets of 10     Red 2 sets of 10  Green 3 x 10, added bicep curls, sh ext rotation   TB LPD             Wall slides sh flex and abd     10 reps each 3 x 10 3 x 10 1 set of 10 1 set of5  reps 5 reps    Sh  stretch wedge sh flex ext rot      10 reps 10 sec hold  Man stretch by PT     Ube alt  2min fwd, bwd       10 min 10 min 10 min alt 2min fwd, bwd 10 min alt 2min fwd, bwd                             Modalities US 1.2 w/cm2 cont, zynex e stim IF  10 min us,  15 min estim IF while perf exer  10 min us,   15 min e stim    E stim 30 min  E stim 30 min      CP PRN

## 2024-09-13 ENCOUNTER — APPOINTMENT (EMERGENCY)
Dept: RADIOLOGY | Facility: HOSPITAL | Age: 54
End: 2024-09-13
Payer: COMMERCIAL

## 2024-09-13 ENCOUNTER — HOSPITAL ENCOUNTER (EMERGENCY)
Facility: HOSPITAL | Age: 54
Discharge: HOME | End: 2024-09-13
Attending: EMERGENCY MEDICINE
Payer: COMMERCIAL

## 2024-09-13 VITALS
RESPIRATION RATE: 18 BRPM | DIASTOLIC BLOOD PRESSURE: 80 MMHG | OXYGEN SATURATION: 92 % | HEART RATE: 83 BPM | SYSTOLIC BLOOD PRESSURE: 171 MMHG | TEMPERATURE: 98.7 F

## 2024-09-13 DIAGNOSIS — L03.115 CELLULITIS OF RIGHT LOWER EXTREMITY: Primary | ICD-10-CM

## 2024-09-13 PROCEDURE — 99284 EMERGENCY DEPT VISIT MOD MDM: CPT | Performed by: EMERGENCY MEDICINE

## 2024-09-13 PROCEDURE — 99283 EMERGENCY DEPT VISIT LOW MDM: CPT

## 2024-09-13 PROCEDURE — 73630 X-RAY EXAM OF FOOT: CPT

## 2024-09-13 RX ORDER — SULFAMETHOXAZOLE/TRIMETHOPRIM 800-160 MG
1 TABLET ORAL ONCE
Status: COMPLETED | OUTPATIENT
Start: 2024-09-13 | End: 2024-09-13

## 2024-09-13 RX ORDER — CLINDAMYCIN HCL 150 MG
450 CAPSULE ORAL EVERY 8 HOURS SCHEDULED
Qty: 63 CAPSULE | Refills: 0 | Status: SHIPPED | OUTPATIENT
Start: 2024-09-13 | End: 2024-09-20

## 2024-09-13 RX ADMIN — SULFAMETHOXAZOLE AND TRIMETHOPRIM 1 TABLET: 800; 160 TABLET ORAL at 17:54

## 2024-09-13 NOTE — DISCHARGE INSTRUCTIONS
Follow-up with your podiatrist as soon as possible.  Return to the emergency department for new or worsening symptoms.

## 2024-09-13 NOTE — ED PROVIDER NOTES
1. Cellulitis of right lower extremity      ED Disposition       ED Disposition   Discharge    Condition   Stable    Date/Time   Fri Sep 13, 2024  6:36 PM    Comment   Blanca Mason discharge to home/self care.                   Assessment & Plan       Medical Decision Making  53-year-old female presented to the emergency department for evaluation of a suspected infection.  On arrival patient was awake, alert and in no acute distress.  Patient with erythema around one of her incision sites.  No induration or fluctuance.  X-ray ordered to evaluate for acute pathology including but not limited to fracture, dislocation, osteomyelitis, hardware displacement.  X-ray on my interpretation with no acute findings.  Patient treated with a dose of Bactrim here in the emergency department.  Discussed sending a prescription for Bactrim to the patient's pharmacy.  Side effects discussed including interacting with the patient's Coumadin.  Patient states having issues with elevated INR levels in the past and prefers to use a different antibiotic.  A prescription for clindamycin was sent to the patient's pharmacy.  Recommendation was made for the patient to follow-up with her podiatrist as soon as possible.  Return precautions were discussed.    Patient agrees with the plan for discharge and feels comfortable to go home with proper f/u. Advised to return for worsening or additional problems. Diagnostic tests were reviewed and questions answered. Diagnosis, care plan and treatment options were discussed. The patient understands instructions and will follow up as directed.        Amount and/or Complexity of Data Reviewed  Radiology: ordered and independent interpretation performed.    Risk  Prescription drug management.                       Medications   sulfamethoxazole-trimethoprim (BACTRIM DS) 800-160 mg per tablet 1 tablet (1 tablet Oral Given 9/13/24 0752)       History of Present Illness       53-year-old female presents to  the emergency department for evaluation of a suspected infection.  Patient reports that she had surgery on her right foot last month and is concerned that one of the incision sites is becoming infected.  Patient reports increased redness around the room.  Triage note states that there is yellow drainage.  Patient denies to me having any drainage or discharge from the area.  No fevers, chills, nausea, vomiting, diarrhea.  No localized numbness, tingling or weakness.            Review of Systems   Constitutional:  Negative for chills and fever.   HENT:  Negative for ear pain and sore throat.    Eyes:  Negative for pain and visual disturbance.   Respiratory:  Negative for cough and shortness of breath.    Cardiovascular:  Negative for chest pain and palpitations.   Gastrointestinal:  Negative for abdominal pain and vomiting.   Genitourinary:  Negative for dysuria and hematuria.   Musculoskeletal:  Negative for arthralgias and back pain.   Skin:  Positive for wound. Negative for color change and rash.   Neurological:  Negative for seizures and syncope.   All other systems reviewed and are negative.          Objective     ED Triage Vitals [09/13/24 1713]   Temperature Pulse Blood Pressure Respirations SpO2 Patient Position - Orthostatic VS   98.7 °F (37.1 °C) 83 (!) 171/80 18 92 % Lying      Temp src Heart Rate Source BP Location FiO2 (%) Pain Score    -- Monitor Left arm -- --        Physical Exam  Vitals and nursing note reviewed.   Constitutional:       General: She is not in acute distress.     Appearance: She is well-developed.   HENT:      Head: Normocephalic and atraumatic.   Eyes:      Conjunctiva/sclera: Conjunctivae normal.   Cardiovascular:      Rate and Rhythm: Normal rate and regular rhythm.      Pulses:           Dorsalis pedis pulses are 2+ on the right side and 2+ on the left side.      Heart sounds: No murmur heard.  Pulmonary:      Effort: Pulmonary effort is normal. No respiratory distress.      Breath  sounds: Normal breath sounds.   Abdominal:      Palpations: Abdomen is soft.      Tenderness: There is no abdominal tenderness.   Musculoskeletal:         General: No swelling.      Cervical back: Neck supple.   Feet:      Right foot:      Skin integrity: Skin breakdown present.      Comments: 3 of skin breakdown by one of the incision sites with surrounding erythema.  No induration or fluctuance.  No streaking.  Distal pulse, motor and sensation intact and symmetric bilaterally.  See attached picture  Skin:     General: Skin is warm and dry.      Capillary Refill: Capillary refill takes less than 2 seconds.      Findings: Erythema present.   Neurological:      Mental Status: She is alert.   Psychiatric:         Mood and Affect: Mood normal.         Labs Reviewed - No data to display  XR foot 3+ views RIGHT   ED Interpretation by Collins Schaefer MD (09/13 9737)   No acute fracture or dislocation.  Hardware intact.  No signs of osteomyelitis.          Procedures       Collins Schaefer MD  09/13/24 4009

## 2024-09-16 DIAGNOSIS — I10 HYPERTENSION: ICD-10-CM

## 2024-09-16 DIAGNOSIS — R73.03 PREDIABETES: ICD-10-CM

## 2024-09-16 DIAGNOSIS — E03.9 ACQUIRED HYPOTHYROIDISM: Chronic | ICD-10-CM

## 2024-09-16 DIAGNOSIS — I10 BENIGN ESSENTIAL HYPERTENSION: Chronic | ICD-10-CM

## 2024-09-17 DIAGNOSIS — E03.9 ACQUIRED HYPOTHYROIDISM: Chronic | ICD-10-CM

## 2024-09-17 DIAGNOSIS — I10 HYPERTENSION: ICD-10-CM

## 2024-09-17 DIAGNOSIS — I10 BENIGN ESSENTIAL HYPERTENSION: Chronic | ICD-10-CM

## 2024-09-17 DIAGNOSIS — R73.03 PREDIABETES: ICD-10-CM

## 2024-09-17 RX ORDER — FUROSEMIDE 20 MG
20 TABLET ORAL DAILY
Qty: 90 TABLET | Refills: 4 | OUTPATIENT
Start: 2024-09-17

## 2024-09-17 RX ORDER — METFORMIN HYDROCHLORIDE 750 MG/1
750 TABLET, EXTENDED RELEASE ORAL
Qty: 90 TABLET | Refills: 4 | OUTPATIENT
Start: 2024-09-17

## 2024-09-17 RX ORDER — LOSARTAN POTASSIUM 100 MG/1
100 TABLET ORAL DAILY
Qty: 90 TABLET | Refills: 4 | OUTPATIENT
Start: 2024-09-17

## 2024-09-17 RX ORDER — LEVOTHYROXINE SODIUM 125 UG/1
125 TABLET ORAL
Qty: 90 TABLET | Refills: 4 | OUTPATIENT
Start: 2024-09-17

## 2024-09-17 NOTE — TELEPHONE ENCOUNTER
Patient called stating she was made aware that none of her medical meds refills were called in, made her aware refills were called in yesterday , just waiting for doctor to send them to the pharmacy . Patient understood .

## 2024-09-18 ENCOUNTER — APPOINTMENT (OUTPATIENT)
Dept: LAB | Facility: CLINIC | Age: 54
End: 2024-09-18
Payer: COMMERCIAL

## 2024-09-18 ENCOUNTER — TELEPHONE (OUTPATIENT)
Dept: HEMATOLOGY ONCOLOGY | Facility: CLINIC | Age: 54
End: 2024-09-18

## 2024-09-18 ENCOUNTER — OFFICE VISIT (OUTPATIENT)
Dept: FAMILY MEDICINE CLINIC | Facility: CLINIC | Age: 54
End: 2024-09-18
Payer: COMMERCIAL

## 2024-09-18 ENCOUNTER — TELEPHONE (OUTPATIENT)
Age: 54
End: 2024-09-18

## 2024-09-18 VITALS
SYSTOLIC BLOOD PRESSURE: 134 MMHG | OXYGEN SATURATION: 95 % | DIASTOLIC BLOOD PRESSURE: 92 MMHG | HEART RATE: 99 BPM | TEMPERATURE: 96.8 F

## 2024-09-18 DIAGNOSIS — Z09 FOLLOW-UP EXAM: Primary | ICD-10-CM

## 2024-09-18 DIAGNOSIS — J44.9 CHRONIC OBSTRUCTIVE PULMONARY DISEASE, UNSPECIFIED COPD TYPE (HCC): Chronic | ICD-10-CM

## 2024-09-18 DIAGNOSIS — K22.70 BARRETT'S ESOPHAGUS DETERMINED BY BIOPSY: Chronic | ICD-10-CM

## 2024-09-18 DIAGNOSIS — D69.6 PLATELETS DECREASED (HCC): ICD-10-CM

## 2024-09-18 DIAGNOSIS — E03.9 ACQUIRED HYPOTHYROIDISM: Chronic | ICD-10-CM

## 2024-09-18 DIAGNOSIS — Z79.01 ANTICOAGULATION MANAGEMENT ENCOUNTER: ICD-10-CM

## 2024-09-18 DIAGNOSIS — G43.709 CHRONIC MIGRAINE WITHOUT AURA WITHOUT STATUS MIGRAINOSUS, NOT INTRACTABLE: ICD-10-CM

## 2024-09-18 DIAGNOSIS — R73.03 PREDIABETES: ICD-10-CM

## 2024-09-18 DIAGNOSIS — Z51.81 ANTICOAGULATION MANAGEMENT ENCOUNTER: ICD-10-CM

## 2024-09-18 DIAGNOSIS — Z86.711 HISTORY OF PULMONARY EMBOLISM: ICD-10-CM

## 2024-09-18 DIAGNOSIS — Z86.718 HISTORY OF DVT (DEEP VEIN THROMBOSIS): ICD-10-CM

## 2024-09-18 DIAGNOSIS — G47.33 OSA (OBSTRUCTIVE SLEEP APNEA): Chronic | ICD-10-CM

## 2024-09-18 DIAGNOSIS — I10 BENIGN ESSENTIAL HYPERTENSION: Chronic | ICD-10-CM

## 2024-09-18 DIAGNOSIS — F39 MOOD DISORDER (HCC): ICD-10-CM

## 2024-09-18 DIAGNOSIS — F25.0 SCHIZOAFFECTIVE DISORDER, BIPOLAR TYPE (HCC): Chronic | ICD-10-CM

## 2024-09-18 DIAGNOSIS — F60.3 BORDERLINE PERSONALITY DISORDER (HCC): ICD-10-CM

## 2024-09-18 DIAGNOSIS — F33.2 SEVERE EPISODE OF RECURRENT MAJOR DEPRESSIVE DISORDER, WITHOUT PSYCHOTIC FEATURES (HCC): ICD-10-CM

## 2024-09-18 DIAGNOSIS — E78.2 MIXED HYPERLIPIDEMIA: ICD-10-CM

## 2024-09-18 PROBLEM — L60.0 INGROWN TOENAIL: Status: RESOLVED | Noted: 2023-12-02 | Resolved: 2024-09-18

## 2024-09-18 LAB
INR PPP: 2.51 (ref 0.85–1.19)
PROTHROMBIN TIME: 27 SECONDS (ref 12.3–15)

## 2024-09-18 PROCEDURE — 99214 OFFICE O/P EST MOD 30 MIN: CPT

## 2024-09-18 PROCEDURE — 36415 COLL VENOUS BLD VENIPUNCTURE: CPT

## 2024-09-18 PROCEDURE — 85610 PROTHROMBIN TIME: CPT

## 2024-09-18 RX ORDER — METFORMIN HYDROCHLORIDE 750 MG/1
750 TABLET, EXTENDED RELEASE ORAL
Qty: 90 TABLET | Refills: 1 | Status: SHIPPED | OUTPATIENT
Start: 2024-09-18

## 2024-09-18 RX ORDER — FUROSEMIDE 20 MG
20 TABLET ORAL DAILY
Qty: 90 TABLET | Refills: 1 | Status: SHIPPED | OUTPATIENT
Start: 2024-09-18

## 2024-09-18 RX ORDER — LOSARTAN POTASSIUM 100 MG/1
100 TABLET ORAL DAILY
Qty: 90 TABLET | Refills: 1 | Status: SHIPPED | OUTPATIENT
Start: 2024-09-18

## 2024-09-18 RX ORDER — ATORVASTATIN CALCIUM 10 MG/1
10 TABLET, FILM COATED ORAL DAILY
Qty: 90 TABLET | Refills: 2 | Status: SHIPPED | OUTPATIENT
Start: 2024-09-18

## 2024-09-18 RX ORDER — LEVOTHYROXINE SODIUM 125 UG/1
125 TABLET ORAL
Qty: 90 TABLET | Refills: 1 | Status: SHIPPED | OUTPATIENT
Start: 2024-09-18

## 2024-09-18 NOTE — ASSESSMENT & PLAN NOTE
05/23 EGD from [facility]   Scheduled EGD with biopsy in October 2024  Current medications: Pantoprazole 40mg

## 2024-09-18 NOTE — PATIENT INSTRUCTIONS
Patient Education     Atorvastatin (a TORE va sta tin)   Brand Names: US Atorvaliq; Lipitor   Brand Names: Escobar ACH-Atorvastatin Calcium; AG-Atorvastatin; APO-Atorvastatin; Atorvastatin-10; Atorvastatin-20; Atorvastatin-40; Atorvastatin-80; Auro-Atorvastatin; BIO-Atorvastatin; DOM-Atorvastatin [DSC]; JAMP-Atorvastatin; JAMP-Atorvastatin Calcium; Lipitor; M-Atorvastatin; Mar-Atorvastatin; MINT-Atorvastatin; MYLAN-Atorvastatin; NRA-Atorvastatin; PMS-Atorvastatin; PMSC-Atorvastatin; PRIVA-Atorvastatin [DSC]; DEL-Atorvastatin; BENJAMIN-Atorvastatin; SAMANTA-Atorvastatin; SANDOZ Atorvastatin; TARO-Atorvastatin; TEVA-Atorvastatin   What is this drug used for?   It is used to lower bad cholesterol, lower triglycerides, and raise good cholesterol (HDL).  It is used in some people to lower the chance of chest pain (angina), heart attack, stroke, and certain heart procedures.  It is used to slow the progress of heart disease.  It may be given to you for other reasons. Talk with the doctor.  What do I need to tell my doctor BEFORE I take this drug?   If you are allergic to this drug; any part of this drug; or any other drugs, foods, or substances. Tell your doctor about the allergy and what signs you had.  If you have liver disease or raised liver enzymes.  If you are taking any of these drugs: Cyclosporine, gemfibrozil, glecaprevir plus pibrentasvir, letermovir, or tipranavir plus ritonavir.  If you are pregnant or may be pregnant. Do not take this drug if you are pregnant.  If you are breast-feeding. Do not breast-feed while you take this drug.  This is not a list of all drugs or health problems that interact with this drug.  Tell your doctor and pharmacist about all of your drugs (prescription or OTC, natural products, vitamins) and health problems. You must check to make sure that it is safe for you to take this drug with all of your drugs and health problems. Do not start, stop, or change the dose of any drug without checking  with your doctor.  What are some things I need to know or do while I take this drug?   Tell all of your health care providers that you take this drug. This includes your doctors, nurses, pharmacists, and dentists.  If you have high blood sugar (diabetes), you will need to watch your blood sugar closely.  Have your blood work and other lab tests checked as you have been told by your doctor.  Follow the diet and workout plan that your doctor told you about.  If you drink grapefruit juice or eat grapefruit often, talk with your doctor.  Avoid or limit drinking alcohol to 2 drinks a day. Drinking too much alcohol may raise your chance of liver disease.  This drug may cause muscle pain, tenderness, or weakness. Sometimes, this may be severe and lead to kidney problems. Rarely, deaths have happened. The risk may be raised if you have thyroid problems, kidney problems, infection, low blood pressure, or seizures. It may also be raised if you take certain other drugs or are dehydrated. People who are 65 or older may have a raised risk. Talk with your doctor if you have questions.  If you are 65 or older, use this drug with care. You could have more side effects.  This drug may cause harm to an unborn baby. If you may become pregnant, you must use birth control while taking this drug. If you get pregnant, call your doctor right away.  What are some side effects that I need to call my doctor about right away?   WARNING/CAUTION: Even though it may be rare, some people may have very bad and sometimes deadly side effects when taking a drug. Tell your doctor or get medical help right away if you have any of the following signs or symptoms that may be related to a very bad side effect:  Signs of an allergic reaction, like rash; hives; itching; red, swollen, blistered, or peeling skin with or without fever; wheezing; tightness in the chest or throat; trouble breathing, swallowing, or talking; unusual hoarseness; or swelling of the  mouth, face, lips, tongue, or throat.  Signs of a urinary tract infection (UTI) like blood in the urine, burning or pain when passing urine, feeling the need to pass urine often or right away, fever, lower stomach pain, or pelvic pain.  Signs of a very bad skin reaction (Yanez-Agustin syndrome/toxic epidermal necrolysis) like red, swollen, blistered, or peeling skin (with or without fever); red or irritated eyes; or sores in the mouth, throat, nose, or eyes.  Severe dizziness or passing out.  A fast heartbeat.  Weakness on 1 side of the body, trouble speaking or thinking, change in balance, drooping on one side of the face, or blurred eyesight.  Not able to pass urine or change in how much urine is passed.  Call your doctor if you have muscle problems like abnormal muscle pain, tenderness, or weakness (with or without fever). If your doctor tells you to stop this drug but your muscle problems do not go away, call your doctor.  Severe and sometimes deadly liver problems have happened with this drug. Call your doctor right away if you have signs of liver problems like dark urine, tiredness, decreased appetite, upset stomach or stomach pain, light-colored stools, throwing up, or yellow skin or eyes.  What are some other side effects of this drug?   All drugs may cause side effects. However, many people have no side effects or only have minor side effects. Call your doctor or get medical help if any of these side effects or any other side effects bother you or do not go away:  Diarrhea.  Joint pain.  Pain in arms or legs.  Upset stomach.  Nose or throat irritation.  Signs of a common cold.  Trouble sleeping.  These are not all of the side effects that may occur. If you have questions about side effects, call your doctor. Call your doctor for medical advice about side effects.  You may report side effects to your national health agency.  You may report side effects to the FDA at 1-210.530.2868. You may also report side  effects at https://www.fda.gov/medwatch.  How is this drug best taken?   Use this drug as ordered by your doctor. Read all information given to you. Follow all instructions closely.  All products:   Take this drug at the same time of day.  Keep taking this drug as you have been told by your doctor or other health care provider, even if you feel well.  Tablets:   Take with or without food.  Suspension:   Take on an empty stomach. Take 1 hour before or 2 hours after meals.  Shake well before use.  Measure liquid doses carefully. Use the measuring device that comes with this drug. If there is none, ask the pharmacist for a device to measure this drug.  What do I do if I miss a dose?   Skip the missed dose and go back to your normal time.  Do not take 2 doses at the same time or extra doses.  How do I store and/or throw out this drug?   Tablets:   Store at room temperature in a dry place. Do not store in a bathroom.  Suspension:   Store in the original container at room temperature.  After opening, throw away any part not used after 60 days.  All products:   Keep all drugs in a safe place. Keep all drugs out of the reach of children and pets.  Throw away unused or  drugs. Do not flush down a toilet or pour down a drain unless you are told to do so. Check with your pharmacist if you have questions about the best way to throw out drugs. There may be drug take-back programs in your area.  General drug facts   If your symptoms or health problems do not get better or if they become worse, call your doctor.  Do not share your drugs with others and do not take anyone else's drugs.  Some drugs may have another patient information leaflet. If you have any questions about this drug, please talk with your doctor, nurse, pharmacist, or other health care provider.  Some drugs may have another patient information leaflet. Check with your pharmacist. If you have any questions about this drug, please talk with your doctor,  nurse, pharmacist, or other health care provider.  If you think there has been an overdose, call your poison control center or get medical care right away. Be ready to tell or show what was taken, how much, and when it happened.  Consumer Information Use and Disclaimer   This generalized information is a limited summary of diagnosis, treatment, and/or medication information. It is not meant to be comprehensive and should be used as a tool to help the user understand and/or assess potential diagnostic and treatment options. It does NOT include all information about conditions, treatments, medications, side effects, or risks that may apply to a specific patient. It is not intended to be medical advice or a substitute for the medical advice, diagnosis, or treatment of a health care provider based on the health care provider's examination and assessment of a patient's specific and unique circumstances. Patients must speak with a health care provider for complete information about their health, medical questions, and treatment options, including any risks or benefits regarding use of medications. This information does not endorse any treatments or medications as safe, effective, or approved for treating a specific patient. [employer] and its affiliates disclaim any warranty or liability relating to this information or the use thereof. The use of this information is governed by the Terms of Use, available at https://www.wolterskluwer.com/en/know/clinical-effectiveness-terms.  Last Reviewed Date   2024-04-26  Copyright   © 2024 [employer] and its affiliates and/or licensors. All rights reserved.

## 2024-09-18 NOTE — TELEPHONE ENCOUNTER
Left a message for Blanca to call back provided call center number.  Please inform patient that her INR is stable, continue her current dose of Coumadin daily and repeat INR next week.

## 2024-09-18 NOTE — ASSESSMENT & PLAN NOTE
Follows with psychiatry   Current medications: Bupropion 450mg, Clonidine 0.2mg, Lurasidone 40mg

## 2024-09-18 NOTE — PROGRESS NOTES
Ambulatory Visit  Name: Blanca Mason      : 1970      MRN: [id number]  Encounter Provider: JHONATAN Armando  Encounter Date: 2024   Encounter department: [facility]    Assessment & Plan  Follow-up exam         Benign essential hypertension  Today's /92   goal bp 140/90, pt's bp is at goal  Current medication: Losartan 100mg, Lasix 20mg, Clonidine 0.2mg BID   Continue current medication regimen   Advised patient on symptoms of hypotension & severe HTN  Limit salt-intake & caffeine. DASH diet discussed, minimize stress level  Weight reduction efforts via improved diet & increased exercise         MAG (obstructive sleep apnea)  Auto bipap          Acquired hypothyroidism   TSH 2.380  T4 0.85  Current medications: Levothyroxine 125mcg  Continue current medication regimen           Mixed hyperlipidemia   Lipid panel  Cholesterol 207, Triglycerides 162, , HDL 48   Lipid panel  Cholesterol 286, Triglycerides 230, , HDL 71  Current Medications:none   New medications: Atorvastatin 10mg   Lifestyle modification: Incorporate regular exercise, avoid sugars and simple carbohydrates, weight loss and choosing healthier fats.      Orders:    atorvastatin (LIPITOR) 10 mg tablet; Take 1 tablet (10 mg total) by mouth daily    Prediabetes   Last A1C 5.7   Current A1C 5.4  Current medications: Metformin 750mg  Continue current medication regimen           Chronic migraine without aura without status migrainosus, not intractable  Under the management of Neurology   Receives Botox injections every 3 months          Chronic obstructive pulmonary disease, unspecified COPD type (HCC)  Well controlled.   No medications needed at this time.          Yan's esophagus determined by biopsy   EGD from [facility]   Scheduled EGD with biopsy in 2024  Current medications: Pantoprazole 40mg          Schizoaffective disorder, bipolar type  (HCC)  Follows with psychiatry   Current medications: Bupropion 450mg, Clonidine 0.2mg, Lurasidone 40mg         Mood disorder (HCC)  Under the management of psychiatry          Severe episode of recurrent major depressive disorder, without psychotic features (HCC)  Under the management of psychiatry          Borderline personality disorder (HCC)  Medications currently managed by psychiatry          Platelets decreased (HCC)  Follows with hematology          History of pulmonary embolism  09/23 [facility] Chest    Large saddle embolus is seen which extends into the right and left lower lobar and several bilateral lower lobe segmental pulmonary arterial branches. There is associated evidence of right heart strain.  Current medication: Coumadin 5mg   Managed by hematology   Goal INR 2-3             History of Present Illness     Follow up on chronic conditions    Hyperlipidemia  This is a recurrent problem. The current episode started more than 1 year ago. The problem is uncontrolled. Recent lipid tests were reviewed and are high. Exacerbating diseases include hypothyroidism and obesity. Factors aggravating her hyperlipidemia include fatty foods. Associated symptoms include leg pain and myalgias. Pertinent negatives include no chest pain, focal sensory loss or shortness of breath. She is currently on no antihyperlipidemic treatment. Risk factors for coronary artery disease include dyslipidemia, a sedentary lifestyle, obesity and hypertension.       History obtained from : patient  Review of Systems   Constitutional:  Negative for activity change, chills, fatigue and fever.   HENT:  Negative for congestion, ear pain, rhinorrhea, sore throat and trouble swallowing.    Eyes:  Negative for pain and visual disturbance.   Respiratory:  Negative for cough, chest tightness and shortness of breath.    Cardiovascular:  Negative for chest pain, palpitations and leg swelling.   Gastrointestinal:  Negative for abdominal pain,  constipation, diarrhea, nausea and vomiting.   Genitourinary:  Negative for difficulty urinating, dysuria, hematuria and urgency.   Musculoskeletal:  Positive for myalgias. Negative for arthralgias and back pain.   Skin:  Negative for color change and rash.   Neurological:  Negative for dizziness, seizures, syncope and headaches.   Psychiatric/Behavioral:  Negative for dysphoric mood. The patient is not nervous/anxious.    All other systems reviewed and are negative.    Medical History Reviewed by provider this encounter:  Tobacco  Allergies  Meds  Problems  Med Hx  Surg Hx  Fam Hx           Objective     /92 (BP Location: Left arm, Patient Position: Sitting, Cuff Size: Large)   Pulse 99   Temp (!) 96.8 °F (36 °C)   SpO2 95%     Physical Exam  Vitals and nursing note reviewed.   Constitutional:       General: She is not in acute distress.     Appearance: Normal appearance. She is well-developed and normal weight.   HENT:      Head: Normocephalic and atraumatic.      Right Ear: Tympanic membrane, ear canal and external ear normal. There is no impacted cerumen.      Left Ear: Tympanic membrane, ear canal and external ear normal. There is no impacted cerumen.      Nose: Nose normal.      Mouth/Throat:      Mouth: Mucous membranes are moist.      Pharynx: Oropharynx is clear.   Eyes:      Extraocular Movements: Extraocular movements intact.      Conjunctiva/sclera: Conjunctivae normal.      Pupils: Pupils are equal, round, and reactive to light.   Cardiovascular:      Rate and Rhythm: Normal rate and regular rhythm.      Pulses: Normal pulses.      Heart sounds: Normal heart sounds. No murmur heard.  Pulmonary:      Effort: Pulmonary effort is normal. No respiratory distress.      Breath sounds: Normal breath sounds.   Abdominal:      General: Bowel sounds are normal.      Palpations: Abdomen is soft.      Tenderness: There is no abdominal tenderness.   Musculoskeletal:         General: Normal range of  motion.      Cervical back: Normal range of motion and neck supple.      Right lower leg: No edema.      Left lower leg: No edema.   Skin:     General: Skin is warm and dry.      Capillary Refill: Capillary refill takes less than 2 seconds.   Neurological:      General: No focal deficit present.      Mental Status: She is alert and oriented to person, place, and time. Mental status is at baseline.   Psychiatric:         Mood and Affect: Mood normal.         Behavior: Behavior normal.         Thought Content: Thought content normal.         Judgment: Judgment normal.       Administrative Statements   I have spent a total time of 20 minutes in caring for this patient on the day of the visit/encounter including Diagnostic results, Prognosis, Risks and benefits of tx options, Instructions for management, Patient and family education, Importance of tx compliance, Risk factor reductions, Impressions, Counseling / Coordination of care, Documenting in the medical record, Reviewing / ordering tests, medicine, procedures  , and Obtaining or reviewing history  .    Patient Instructions   Patient Education     Atorvastatin (a TORE va sta tin)   Brand Names: US Atorvaliq; Lipitor   Brand Names: Escobar ACH-Atorvastatin Calcium; AG-Atorvastatin; APO-Atorvastatin; Atorvastatin-10; Atorvastatin-20; Atorvastatin-40; Atorvastatin-80; Auro-Atorvastatin; BIO-Atorvastatin; DOM-Atorvastatin [DSC]; JAMP-Atorvastatin; JAMP-Atorvastatin Calcium; Lipitor; M-Atorvastatin; Mar-Atorvastatin; MINT-Atorvastatin; MYLAN-Atorvastatin; NRA-Atorvastatin; PMS-Atorvastatin; PMSC-Atorvastatin; PRIVA-Atorvastatin [DSC]; DEL-Atorvastatin; BENJAMIN-Atorvastatin; SAMANTA-Atorvastatin; SANDOZ Atorvastatin; TARO-Atorvastatin; TEVA-Atorvastatin   What is this drug used for?   It is used to lower bad cholesterol, lower triglycerides, and raise good cholesterol (HDL).  It is used in some people to lower the chance of chest pain (angina), heart attack, stroke, and  certain heart procedures.  It is used to slow the progress of heart disease.  It may be given to you for other reasons. Talk with the doctor.  What do I need to tell my doctor BEFORE I take this drug?   If you are allergic to this drug; any part of this drug; or any other drugs, foods, or substances. Tell your doctor about the allergy and what signs you had.  If you have liver disease or raised liver enzymes.  If you are taking any of these drugs: Cyclosporine, gemfibrozil, glecaprevir plus pibrentasvir, letermovir, or tipranavir plus ritonavir.  If you are pregnant or may be pregnant. Do not take this drug if you are pregnant.  If you are breast-feeding. Do not breast-feed while you take this drug.  This is not a list of all drugs or health problems that interact with this drug.  Tell your doctor and pharmacist about all of your drugs (prescription or OTC, natural products, vitamins) and health problems. You must check to make sure that it is safe for you to take this drug with all of your drugs and health problems. Do not start, stop, or change the dose of any drug without checking with your doctor.  What are some things I need to know or do while I take this drug?   Tell all of your health care providers that you take this drug. This includes your doctors, nurses, pharmacists, and dentists.  If you have high blood sugar (diabetes), you will need to watch your blood sugar closely.  Have your blood work and other lab tests checked as you have been told by your doctor.  Follow the diet and workout plan that your doctor told you about.  If you drink grapefruit juice or eat grapefruit often, talk with your doctor.  Avoid or limit drinking alcohol to 2 drinks a day. Drinking too much alcohol may raise your chance of liver disease.  This drug may cause muscle pain, tenderness, or weakness. Sometimes, this may be severe and lead to kidney problems. Rarely, deaths have happened. The risk may be raised if you have thyroid  problems, kidney problems, infection, low blood pressure, or seizures. It may also be raised if you take certain other drugs or are dehydrated. People who are 65 or older may have a raised risk. Talk with your doctor if you have questions.  If you are 65 or older, use this drug with care. You could have more side effects.  This drug may cause harm to an unborn baby. If you may become pregnant, you must use birth control while taking this drug. If you get pregnant, call your doctor right away.  What are some side effects that I need to call my doctor about right away?   WARNING/CAUTION: Even though it may be rare, some people may have very bad and sometimes deadly side effects when taking a drug. Tell your doctor or get medical help right away if you have any of the following signs or symptoms that may be related to a very bad side effect:  Signs of an allergic reaction, like rash; hives; itching; red, swollen, blistered, or peeling skin with or without fever; wheezing; tightness in the chest or throat; trouble breathing, swallowing, or talking; unusual hoarseness; or swelling of the mouth, face, lips, tongue, or throat.  Signs of a urinary tract infection (UTI) like blood in the urine, burning or pain when passing urine, feeling the need to pass urine often or right away, fever, lower stomach pain, or pelvic pain.  Signs of a very bad skin reaction (Yanez-Agustin syndrome/toxic epidermal necrolysis) like red, swollen, blistered, or peeling skin (with or without fever); red or irritated eyes; or sores in the mouth, throat, nose, or eyes.  Severe dizziness or passing out.  A fast heartbeat.  Weakness on 1 side of the body, trouble speaking or thinking, change in balance, drooping on one side of the face, or blurred eyesight.  Not able to pass urine or change in how much urine is passed.  Call your doctor if you have muscle problems like abnormal muscle pain, tenderness, or weakness (with or without fever). If your  doctor tells you to stop this drug but your muscle problems do not go away, call your doctor.  Severe and sometimes deadly liver problems have happened with this drug. Call your doctor right away if you have signs of liver problems like dark urine, tiredness, decreased appetite, upset stomach or stomach pain, light-colored stools, throwing up, or yellow skin or eyes.  What are some other side effects of this drug?   All drugs may cause side effects. However, many people have no side effects or only have minor side effects. Call your doctor or get medical help if any of these side effects or any other side effects bother you or do not go away:  Diarrhea.  Joint pain.  Pain in arms or legs.  Upset stomach.  Nose or throat irritation.  Signs of a common cold.  Trouble sleeping.  These are not all of the side effects that may occur. If you have questions about side effects, call your doctor. Call your doctor for medical advice about side effects.  You may report side effects to your national health agency.  You may report side effects to the FDA at 1-415.799.6703. You may also report side effects at https://www.fda.gov/medwatch.  How is this drug best taken?   Use this drug as ordered by your doctor. Read all information given to you. Follow all instructions closely.  All products:   Take this drug at the same time of day.  Keep taking this drug as you have been told by your doctor or other health care provider, even if you feel well.  Tablets:   Take with or without food.  Suspension:   Take on an empty stomach. Take 1 hour before or 2 hours after meals.  Shake well before use.  Measure liquid doses carefully. Use the measuring device that comes with this drug. If there is none, ask the pharmacist for a device to measure this drug.  What do I do if I miss a dose?   Skip the missed dose and go back to your normal time.  Do not take 2 doses at the same time or extra doses.  How do I store and/or throw out this drug?    Tablets:   Store at room temperature in a dry place. Do not store in a bathroom.  Suspension:   Store in the original container at room temperature.  After opening, throw away any part not used after 60 days.  All products:   Keep all drugs in a safe place. Keep all drugs out of the reach of children and pets.  Throw away unused or  drugs. Do not flush down a toilet or pour down a drain unless you are told to do so. Check with your pharmacist if you have questions about the best way to throw out drugs. There may be drug take-back programs in your area.  General drug facts   If your symptoms or health problems do not get better or if they become worse, call your doctor.  Do not share your drugs with others and do not take anyone else's drugs.  Some drugs may have another patient information leaflet. If you have any questions about this drug, please talk with your doctor, nurse, pharmacist, or other health care provider.  Some drugs may have another patient information leaflet. Check with your pharmacist. If you have any questions about this drug, please talk with your doctor, nurse, pharmacist, or other health care provider.  If you think there has been an overdose, call your poison control center or get medical care right away. Be ready to tell or show what was taken, how much, and when it happened.  Consumer Information Use and Disclaimer   This generalized information is a limited summary of diagnosis, treatment, and/or medication information. It is not meant to be comprehensive and should be used as a tool to help the user understand and/or assess potential diagnostic and treatment options. It does NOT include all information about conditions, treatments, medications, side effects, or risks that may apply to a specific patient. It is not intended to be medical advice or a substitute for the medical advice, diagnosis, or treatment of a health care provider based on the health care provider's examination  and assessment of a patient's specific and unique circumstances. Patients must speak with a health care provider for complete information about their health, medical questions, and treatment options, including any risks or benefits regarding use of medications. This information does not endorse any treatments or medications as safe, effective, or approved for treating a specific patient. UpToDate, Inc. and its affiliates disclaim any warranty or liability relating to this information or the use thereof. The use of this information is governed by the Terms of Use, available at https://www.woltersmSpokeuwer.com/en/know/clinical-effectiveness-terms.  Last Reviewed Date   2024-04-26  Copyright   © 2024 UpToDate, Inc. and its affiliates and/or licensors. All rights reserved.

## 2024-09-18 NOTE — ASSESSMENT & PLAN NOTE
01/24 Lipid panel  Cholesterol 207, Triglycerides 162, , HDL 48  07/24 Lipid panel  Cholesterol 286, Triglycerides 230, , HDL 71  Current Medications:none   New medications: Atorvastatin 10mg   Lifestyle modification: Incorporate regular exercise, avoid sugars and simple carbohydrates, weight loss and choosing healthier fats.      Orders:    atorvastatin (LIPITOR) 10 mg tablet; Take 1 tablet (10 mg total) by mouth daily

## 2024-09-18 NOTE — ASSESSMENT & PLAN NOTE
Today's /92   goal bp 140/90, pt's bp is at goal  Current medication: Losartan 100mg, Lasix 20mg, Clonidine 0.2mg BID   Continue current medication regimen   Advised patient on symptoms of hypotension & severe HTN  Limit salt-intake & caffeine. DASH diet discussed, minimize stress level  Weight reduction efforts via improved diet & increased exercise

## 2024-09-18 NOTE — ASSESSMENT & PLAN NOTE
07/24 TSH 2.380  T4 0.85  Current medications: Levothyroxine 125mcg  Continue current medication regimen

## 2024-09-18 NOTE — TELEPHONE ENCOUNTER
Pt called back to review James ISRAEL message. Pt understood recommendations and had no further questions at this time.

## 2024-09-19 ENCOUNTER — DOCUMENTATION (OUTPATIENT)
Dept: CASE MANAGEMENT | Facility: HOSPITAL | Age: 54
End: 2024-09-19

## 2024-09-19 NOTE — BEHAVIORAL HEALTH HIGH UTILIZER
Patient Name:Blanca Mason MRN:  [id number]         : 1970     Age: 53 y.o.    Sex: female   Utilization History:  (# of ED visits & IP admits; reasons)  Pt had 4 [facility]-ED visits from 2024- 2024. ED presentations were related to SI with no plan or with a plan to overdose on medications. Pt intentionally overdosed on Flexeril twice and Trazodone twice. Pt reports periodic non-compliance with her medications.       Medical presentations were related to chest pain, right knee pain, withdrawing from meth, asthma exacerbation, fecal impaction, abdominal pain, toe or leg pain, dyspnea, SOB, bilateral leg edema, acute Saddle PE, acute respiratory failure, left shoulder pain, elevated INR, cellulitis of right lower extremity.   Treatment Recommendations & Presentation:  Presentation in the ED: Pt typically presents self to ED's. ED visits were related to SI with no plan or with a plan to overdose on medications. Pt intentionally overdosed on Flexeril twice and Trazodone twice. Pt reports periodic non-compliance with her medications.       Medical presentations were related to chest pain, right knee pain, withdrawing from meth, asthma exacerbation, fecal impaction, abdominal pain, toe or leg pain, dyspnea, SOB, bilateral leg edema, acute Saddle PE, acute respiratory failure, elevated INR, cellulitis right lower extremity.             ED Recommendations:  Following medical evaluation and treatment, allow pt time for de-escalation and to establish acute risk versus pt's chronic behavioral disturbances. Please contact pt's [facility] ACT team to develop a safe discharge plan for pt to return home at [phone]. Pt should be contacting her ACT team when in Crisis and formulating an action plan to include distress tolerance coping skills.   Pt should continue her medical treatment with her multiple medical speciality providers.       Home Medication Regimen: see most recent documentation in Epic, you can also  verify pt's medications with her ACT team.      Recent Medical Work Ups:  (include Psych testing or ECT)      Comprehensive labs - 2024  CT's -   Xrays -   VAS -   ECG - 2024    Right foot and toe surgery 2024 Inpatient Recommendations: Collaborate with pt's community resources to develop a comprehensive discharge plan.           Outpatient recommendations: Pt should continue her medical and mental health care with her community providers and take her medications as prescribed.      Situation/Relevant Background Info:  Pt is a 53 year old female with a diagnosis of Schizoaffective Disorder/ Bipolar type, Borderline Personality Disorder and a past history of meth abuse (clean since 2023 but uses medical marijuana) and numerous behavioral health hospitalizations.     Pt lives alone in an apartment that is close to where her father resides and has the [facility] ACT team. Pt's mother  a few years ago in  and pt reports this as a difficult issue for her.   Pt has experienced various medical issues in  and appears to be very preoccupied with her medical conditions, perceived medical issues and treatment and becomes frustrated if she feels she is not obtaining the medical care she needs in a timely manner. Pt has displayed demanding and agitated behaviors while on behavioral health units.  Pt was active in going to see her father in the community and helping him as well as working part-time approx 21 hours per week. It is unknown if pt is still working. Pt appears to have poor distress tolerance and impulsivity. Pt appears to want to control her discharge date from inpatient units.  Pt has multiple medical speciality providers in addition to her PCP office: [facility], [facility], [facility], [facility], [facility],  [facility],  [facility].  Pt had right foot bunionectomy and hammertoe surgery 2024.                          Diagnoses/Significant Problems (Medical & Psychiatric):      Schizoaffective disorder, bipolar type, Borderline Personality Disorder  Active Problems:    Benign essential hypertension    Edema    Hypothyroidism    MAG    Overactive bladder    Sjogren's syndrome (HCC)    COPD (chronic obstructive pulmonary disease) ([facility])    Medical clearance for psychiatric admission    History of pulmonary embolism    Ingrown toenail                      Drivers of repeated utilization:   preoccupation with medical concerns and issues, personality disordered behaviors, limited to poor coping skills, impulsivity, secondary gains from ED visits and hospitalizations.                                                  Existing Community Resources & Supports:                 father Luigi Mason - [phone]     Patient Medical & Psychiatric Care Team:  [facility] team - [phone]  [facility] PCP - [phone]  [facility]  [facility]  [facility]  [facility]  [facility]  [facility]    Care plan date: 09/19/24                   Author:  Dennise Napier RN                 Date reviewed with patient:

## 2024-09-26 ENCOUNTER — APPOINTMENT (OUTPATIENT)
Dept: LAB | Facility: CLINIC | Age: 54
End: 2024-09-26
Payer: COMMERCIAL

## 2024-09-26 DIAGNOSIS — Z79.01 ANTICOAGULATION MANAGEMENT ENCOUNTER: ICD-10-CM

## 2024-09-26 DIAGNOSIS — Z86.718 HISTORY OF DVT (DEEP VEIN THROMBOSIS): ICD-10-CM

## 2024-09-26 DIAGNOSIS — Z51.81 ANTICOAGULATION MANAGEMENT ENCOUNTER: ICD-10-CM

## 2024-09-26 DIAGNOSIS — Z86.711 HISTORY OF PULMONARY EMBOLISM: ICD-10-CM

## 2024-09-26 LAB
INR PPP: 3.46 (ref 0.85–1.19)
PROTHROMBIN TIME: 34.3 SECONDS (ref 12.3–15)

## 2024-09-26 PROCEDURE — 36415 COLL VENOUS BLD VENIPUNCTURE: CPT

## 2024-09-26 PROCEDURE — 85610 PROTHROMBIN TIME: CPT

## 2024-09-27 ENCOUNTER — TELEPHONE (OUTPATIENT)
Dept: HEMATOLOGY ONCOLOGY | Facility: CLINIC | Age: 54
End: 2024-09-27

## 2024-09-27 NOTE — TELEPHONE ENCOUNTER
LVM for patient to inform her that her INR was 3.47.  I'd like her to hold her coumadin today and tomorrow, restart on Sunday 9/29/2024 at 5mg daily.  Asked her to call back to let me know she has received the message.  Direct hope line number provided.

## 2024-09-29 ENCOUNTER — APPOINTMENT (EMERGENCY)
Dept: RADIOLOGY | Facility: HOSPITAL | Age: 54
End: 2024-09-29
Payer: COMMERCIAL

## 2024-09-29 ENCOUNTER — HOSPITAL ENCOUNTER (EMERGENCY)
Facility: HOSPITAL | Age: 54
Discharge: HOME | End: 2024-09-29
Attending: EMERGENCY MEDICINE
Payer: COMMERCIAL

## 2024-09-29 VITALS
DIASTOLIC BLOOD PRESSURE: 76 MMHG | SYSTOLIC BLOOD PRESSURE: 154 MMHG | HEART RATE: 92 BPM | OXYGEN SATURATION: 94 % | TEMPERATURE: 98.2 F | RESPIRATION RATE: 18 BRPM

## 2024-09-29 DIAGNOSIS — R22.9 SOFT TISSUE SWELLING: ICD-10-CM

## 2024-09-29 DIAGNOSIS — M79.609 PAIN IN LIMB: Primary | ICD-10-CM

## 2024-09-29 LAB
ANION GAP SERPL CALCULATED.3IONS-SCNC: 10 MMOL/L (ref 4–13)
APTT PPP: 39 SECONDS (ref 23–34)
BASOPHILS # BLD AUTO: 0.04 THOUSANDS/ÂΜL (ref 0–0.1)
BASOPHILS NFR BLD AUTO: 0 % (ref 0–1)
BUN SERPL-MCNC: 13 MG/DL (ref 5–25)
CALCIUM SERPL-MCNC: 8.9 MG/DL (ref 8.4–10.2)
CHLORIDE SERPL-SCNC: 105 MMOL/L (ref 96–108)
CO2 SERPL-SCNC: 25 MMOL/L (ref 21–32)
CREAT SERPL-MCNC: 0.57 MG/DL (ref 0.6–1.3)
EOSINOPHIL # BLD AUTO: 0.07 THOUSAND/ÂΜL (ref 0–0.61)
EOSINOPHIL NFR BLD AUTO: 1 % (ref 0–6)
ERYTHROCYTE [DISTWIDTH] IN BLOOD BY AUTOMATED COUNT: 14.9 % (ref 11.6–15.1)
GFR SERPL CREATININE-BSD FRML MDRD: 106 ML/MIN/1.73SQ M
GLUCOSE SERPL-MCNC: 89 MG/DL (ref 65–140)
HCT VFR BLD AUTO: 42.5 % (ref 34.8–46.1)
HGB BLD-MCNC: 13.3 G/DL (ref 11.5–15.4)
IMM GRANULOCYTES # BLD AUTO: 0.08 THOUSAND/UL (ref 0–0.2)
IMM GRANULOCYTES NFR BLD AUTO: 1 % (ref 0–2)
INR PPP: 1.82 (ref 0.85–1.19)
LYMPHOCYTES # BLD AUTO: 1.84 THOUSANDS/ÂΜL (ref 0.6–4.47)
LYMPHOCYTES NFR BLD AUTO: 19 % (ref 14–44)
MCH RBC QN AUTO: 29.2 PG (ref 26.8–34.3)
MCHC RBC AUTO-ENTMCNC: 31.3 G/DL (ref 31.4–37.4)
MCV RBC AUTO: 93 FL (ref 82–98)
MONOCYTES # BLD AUTO: 0.8 THOUSAND/ÂΜL (ref 0.17–1.22)
MONOCYTES NFR BLD AUTO: 8 % (ref 4–12)
NEUTROPHILS # BLD AUTO: 6.67 THOUSANDS/ÂΜL (ref 1.85–7.62)
NEUTS SEG NFR BLD AUTO: 71 % (ref 43–75)
NRBC BLD AUTO-RTO: 0 /100 WBCS
PLATELET # BLD AUTO: 178 THOUSANDS/UL (ref 149–390)
PMV BLD AUTO: 11 FL (ref 8.9–12.7)
POTASSIUM SERPL-SCNC: 3.6 MMOL/L (ref 3.5–5.3)
PROTHROMBIN TIME: 21.3 SECONDS (ref 12.3–15)
RBC # BLD AUTO: 4.56 MILLION/UL (ref 3.81–5.12)
SODIUM SERPL-SCNC: 140 MMOL/L (ref 135–147)
WBC # BLD AUTO: 9.5 THOUSAND/UL (ref 4.31–10.16)

## 2024-09-29 PROCEDURE — 99284 EMERGENCY DEPT VISIT MOD MDM: CPT | Performed by: EMERGENCY MEDICINE

## 2024-09-29 PROCEDURE — 73630 X-RAY EXAM OF FOOT: CPT

## 2024-09-29 PROCEDURE — 85730 THROMBOPLASTIN TIME PARTIAL: CPT | Performed by: EMERGENCY MEDICINE

## 2024-09-29 PROCEDURE — 80048 BASIC METABOLIC PNL TOTAL CA: CPT | Performed by: EMERGENCY MEDICINE

## 2024-09-29 PROCEDURE — 96365 THER/PROPH/DIAG IV INF INIT: CPT

## 2024-09-29 PROCEDURE — 85610 PROTHROMBIN TIME: CPT | Performed by: EMERGENCY MEDICINE

## 2024-09-29 PROCEDURE — 85025 COMPLETE CBC W/AUTO DIFF WBC: CPT | Performed by: EMERGENCY MEDICINE

## 2024-09-29 PROCEDURE — 36415 COLL VENOUS BLD VENIPUNCTURE: CPT | Performed by: EMERGENCY MEDICINE

## 2024-09-29 PROCEDURE — 99283 EMERGENCY DEPT VISIT LOW MDM: CPT

## 2024-09-29 PROCEDURE — 73610 X-RAY EXAM OF ANKLE: CPT

## 2024-09-29 RX ORDER — ACETAMINOPHEN 10 MG/ML
1000 INJECTION, SOLUTION INTRAVENOUS ONCE
Status: COMPLETED | OUTPATIENT
Start: 2024-09-29 | End: 2024-09-29

## 2024-09-29 RX ADMIN — ACETAMINOPHEN 1000 MG: 10 INJECTION INTRAVENOUS at 17:24

## 2024-09-29 NOTE — ED PROVIDER NOTES
Final diagnoses:   Pain in limb   Soft tissue swelling     ED Disposition       ED Disposition   Discharge    Condition   Stable    Date/Time   Sun Sep 29, 2024  6:13 PM    Comment   Blanca Mason discharge to home/self care.                   Assessment & Plan       Medical Decision Making  Differential diagnosis includes soft tissue swelling, fracture, dislocation, osteomyelitis,    Problems Addressed:  Pain in limb: chronic illness or injury with exacerbation, progression, or side effects of treatment  Soft tissue swelling: acute illness or injury    Amount and/or Complexity of Data Reviewed  Labs: ordered. Decision-making details documented in ED Course.  Radiology: ordered and independent interpretation performed.     Details: X-rays within normal limits for patient no acute fracture or dislocation hardware appears appropriate no signs of osteomyelitis    Risk  OTC drugs.  Prescription drug management.  Risk Details: Patient may take Tylenol for pain she should continue all current medications.  Discussed with her following up for her INR with PCP        ED Course as of 09/29/24 1856   Sun Sep 29, 2024   1743 Computer issues cbc and pt/ptt/ inr ok inr 1.8 slightly low   1815 Bmp wnl       Medications   acetaminophen (Ofirmev) injection 1,000 mg (0 mg Intravenous Stopped 9/29/24 1836)       ED Risk Strat Scores                                               History of Present Illness       Chief Complaint   Patient presents with    Foot Pain     Brought in by EMS, pt ambulatory to bed. Pt states R foot pain increasing over the past few days, seen last week for infection. Pt still taking abx, follow-up appt with surgeon in 2 weeks. Foot surgery was back in august       Past Medical History:   Diagnosis Date    Acute deep vein thrombosis of lower leg, left ([facility]) 10/16/2023    Acute heart failure, unspecified heart failure type ([facility]) 05/06/2024    Anxiety     Arthritis     oa cassandra knees    Asthma     good control-  "no medications    Yan's esophagus     Bipolar affective disorder (HCC)     Cannabis abuse with unspecified 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HCC)     Chronic pain disorder     lumbar    Contusion of elbow 12/21/2021    COPD (chronic obstructive pulmonary disease) (HCC)     CPAP (continuous positive airway pressure) dependence     DDD (degenerative disc disease), lumbar 04/09/2015    Degenerative disc disease at L5-S1 level     Deliberate self-cutting     9/15/22per pt has not done recently-- does see a therapist and psychiatrist regularly    Depression 09/16/2008    Diarrhea 01/06/2022    Disease of thyroid gland     hypo    MARTINEZ (dyspnea on exertion)     per pt \"has had this with exertion and not new\"    Drug overdose 10/28/2008    suicide attempt and again drug overdose 7/2022-- [facility] floor and than transferred to [facility]    Dysphagia     Dyspnea     Ecchymosis 01/06/2022    Edema     BLE    Elevated troponin 09/02/2023    Family history of blood clots     GERD (gastroesophageal reflux disease)     Grief 11/11/2023    Headache(784.0)     Headache, tension-type     Hemorrhage of rectum and anus 10/02/2017    Formatting of this note might be different from the original.  Added automatically from request for surgery [id number]    High blood pressure     History of COVID-19 12/30/2020    Symptoms started on 12/30/2020 and then got worse.  Today she is feeling a little bit better.  She is a candidate for monoclonal antibody infusion and set for 1/6/21 @ 1pm at [facility]. 01/07/21 - telemedicine -     Hyperlipidemia     Hypertension     Ingrown toenail 12/02/2023    Intentional overdose (HCC) 09/27/2023    Intentional self-harm by unspecified sharp object, sequela (HCC) 02/09/2023    Knee pain, bilateral     Especially right    Knee pain, right 02/23/2022    Medial epicondylitis of elbow, left 04/03/2018    Formatting of this note might be different from the original.  Added automatically from request for " surgery [id number]    Medial epicondylitis of right elbow 07/17/2023    Medical marijuana use     Memory difficulties 08/06/2020    Memory loss     Migraines     Obesity     Other psychoactive substance abuse with unspecified psychoactive substance-induced disorder (HCC) 05/06/2024    Overactive bladder     Polysubstance abuse (HCC) 09/29/2023    Potential for cognitive impairment 06/17/2021    Primary osteoarthritis of both knees 08/08/2018    Pulmonary emboli (HCC)     Restrictive lung disease 02/01/2017    Last Assessment & Plan:   Formatting of this note might be different from the original.  I reviewed this problem throughout the rest of the note. Please refer to the other assessments for details.    Sjogren's disease (HCC)     Sleep apnea     Stress incontinence     Suicidal deliberate poisoning (HCC) 07/13/2022    Suicidal ideations     Teeth missing     Toxic encephalopathy 11/08/2023    Tremor     per pt Tremors of Right Leg and both Arms    Visual impairment     Wears glasses       Past Surgical History:   Procedure Laterality Date    BREAST CYST EXCISION Right 1989    CARPAL TUNNEL RELEASE Left     CHOLECYSTECTOMY  05/2003    Laparoscopic    COLONOSCOPY      01/12/2009    DILATION AND CURETTAGE OF UTERUS      ELBOW SURGERY Left     x2. No hardware    ESOPHAGOGASTRODUODENOSCOPY      FOOT SURGERY Left     Plantar fasciotomy    HERNIA REPAIR      HYSTERECTOMY      laporoscopic, partial    KNEE ARTHROSCOPY Bilateral     OOPHORECTOMY Left 10/2015    MO CORRJ HLX VLGS BNCTY SESMDC JOINT ARTHRODESIS Right 8/2/2024    Procedure: RIGHT FOOT LAPIDUS BUNIONECTOMY, 2ND HAMMERTOE REPAIR, SECOND METATARSAL OSTEOTOMY WITH SECOND PLANTAR PLATE REPAIR;  Surgeon: Kaz Bautista DPM;  Location:  MAIN OR;  Service: Podiatry    MO GASTROCNEMIUS RECESSION Left 02/24/2021    Procedure: RECESSION GASTROC OPEN;  Surgeon: Nomi Yang DPM;  Location:  MAIN OR;  Service: Podiatry    MO RPR UMBILICAL HRNA 5 YRS/>  REDUCIBLE N/A 2019    Procedure: REPAIR HERNIA UMBILICAL LAPAROSCOPIC W/ ROBOTICS;  Surgeon: Anahi Colon MD;  Location: [facility];  Service: General    KS SPHINCTEROTOMY ANAL DIVISION SPHINCTER SPX N/A 2018    Procedure: EUA, LEFT PARTIAL INTERNAL SPHINCTEROTOMY;  Surgeon: Tien Reyes MD;  Location: [facility] OR;  Service: Colorectal    KS TNOT ELBOW LATERAL/MEDIAL DEBRIDE OPEN TDN RPR Left 2022    Procedure: RELEASE EPICONDYLAR ELBOW MEDIAL;  Surgeon: Kyree Winkler MD;  [facility];  Service: Orthopedics    REDUCTION MAMMAPLASTY Bilateral 1999    REPAIR LACERATION Left     left hand-2009    REPLACEMENT TOTAL KNEE Right     ROTATOR CUFF REPAIR Left     TONSILECTOMY AND ADNOIDECTOMY      US GUIDED MSK PROCEDURE  2021    VASCULAR SURGERY      VEIN LIGATION Bilateral     WISDOM TOOTH EXTRACTION        Family History   Problem Relation Age of Onset    Kidney cancer Mother     Diabetes Mother     Depression Mother     Stroke Mother     Arthritis Mother     Cancer Mother     Psychiatric Illness Mother     No Known Problems Father     No Known Problems Sister     No Known Problems Maternal Grandmother     No Known Problems Maternal Grandfather     No Known Problems Paternal Grandmother     No Known Problems Paternal Grandfather     Colon cancer Maternal Uncle     Colon cancer Maternal Uncle     Colon cancer Paternal Uncle     Colon cancer Family     Alcohol abuse Sister     Asthma Sister       Social History     Tobacco Use    Smoking status: Former     Current packs/day: 0.00     Average packs/day: 2.0 packs/day for 33.0 years (66.0 ttl pk-yrs)     Types: Cigarettes     Start date: 1985     Quit date: 2018     Years since quittin.7    Smokeless tobacco: Never    Tobacco comments:     Started at age 16.   Vaping Use    Vaping status: Some Days    Substances: THC   Substance Use Topics    Alcohol use: Not Currently     Comment: Recovering alcoholic    Drug use:  Yes     Types: Marijuana     Comment: Former meth use, medicinal marijuana      E-Cigarette/Vaping    E-Cigarette Use Current Some Day User     Comments medical THC       E-Cigarette/Vaping Substances    Nicotine No     THC Yes     CBD No     Flavoring No     Other No     Unknown No       I have reviewed and agree with the history as documented.     53-year-old female comes in for evaluation of ankle and foot pain.  Patient has had multiple surgeries and hardware in her foot increased pain and swelling over the last several days.  Recently treated for cellulitis of the foot concern infection went into bone.  No fevers cellulitis actually looks better.  Complains of pain especially with ambulation and increased swelling      History provided by:  Patient and medical records   used: No    Ankle Pain  Location:  Ankle and foot  Time since incident:  7 days  Injury: no    Ankle location:  R ankle  Foot location:  R foot  Pain details:     Quality:  Cramping and pressure    Radiates to:  Does not radiate    Severity:  Moderate    Onset quality:  Gradual    Duration:  7 days    Timing:  Constant    Progression:  Worsening  Dislocation: no    Tetanus status:  Up to date  Prior injury to area:  Yes  Ineffective treatments:  None tried  Associated symptoms: swelling    Associated symptoms: no back pain and no fever    Risk factors: known bone disorder and obesity    Risk factors: no concern for non-accidental trauma        Review of Systems   Constitutional:  Negative for chills and fever.   HENT:  Negative for ear pain and sore throat.    Eyes:  Negative for pain and visual disturbance.   Respiratory:  Negative for cough and shortness of breath.    Cardiovascular:  Negative for chest pain and palpitations.   Gastrointestinal:  Negative for abdominal pain and vomiting.   Genitourinary:  Negative for dysuria and hematuria.   Musculoskeletal:  Negative for arthralgias and back pain.   Skin:  Negative for  color change and rash.   Neurological:  Negative for seizures and syncope.   All other systems reviewed and are negative.          Objective       ED Triage Vitals   Temperature Pulse Blood Pressure Respirations SpO2 Patient Position - Orthostatic VS   09/29/24 1553 09/29/24 1553 09/29/24 1553 09/29/24 1553 09/29/24 1553 09/29/24 1845   98.2 °F (36.8 °C) 87 145/89 18 95 % Lying      Temp Source Heart Rate Source BP Location FiO2 (%) Pain Score    09/29/24 1553 09/29/24 1845 09/29/24 1845 -- --    Oral Monitor Left arm        Vitals      Date and Time Temp Pulse SpO2 Resp BP Pain Score FACES Pain Rating User   09/29/24 1845 -- 92 94 % 18 154/76 -- -- DG   09/29/24 1553 98.2 °F (36.8 °C) 87 95 % 18 145/89 -- -- BG            Physical Exam  Vitals and nursing note reviewed.   Constitutional:       General: She is not in acute distress.     Appearance: She is well-developed.   HENT:      Head: Normocephalic and atraumatic.   Eyes:      Conjunctiva/sclera: Conjunctivae normal.   Cardiovascular:      Rate and Rhythm: Normal rate and regular rhythm.      Heart sounds: No murmur heard.  Pulmonary:      Effort: Pulmonary effort is normal. No respiratory distress.      Breath sounds: Normal breath sounds.   Abdominal:      Palpations: Abdomen is soft.      Tenderness: There is no abdominal tenderness.   Musculoskeletal:         General: No swelling.      Cervical back: Neck supple.      Right ankle: Swelling present.      Right foot: Swelling present.   Skin:     General: Skin is warm and dry.      Capillary Refill: Capillary refill takes less than 2 seconds.   Neurological:      Mental Status: She is alert.   Psychiatric:         Mood and Affect: Mood normal.         Results Reviewed       Procedure Component Value Units Date/Time    CBC and differential [[id number]] Collected: 09/29/24 1710    Lab Status: No result Specimen: Blood from Arm, Left     Basic metabolic panel [[id number]] Collected: 09/29/24 1710    Lab Status: No  result Specimen: Blood from Arm, Left     Protime-INR [[id number]] Collected: 09/29/24 1710    Lab Status: No result Specimen: Blood from Arm, Left     APTT [[id number]] Collected: 09/29/24 1710    Lab Status: No result Specimen: Blood from Arm, Left             XR foot 3+ views RIGHT    (Results Pending)   XR ankle 3+ views RIGHT    (Results Pending)       Procedures    ED Medication and Procedure Management   Prior to Admission Medications   Prescriptions Last Dose Informant Patient Reported? Taking?   Austedo XR 24 MG TB24   Yes No   Cholecalciferol (Vitamin D3) 125 MCG (5000 UT) capsule   No No   Sig: Take 1 capsule (5,000 Units total) by mouth daily   OXcarbazepine (TRILEPTAL) 150 mg tablet  Self No No   Sig: Take 3 tablets (450 mg total) by mouth every 12 (twelve) hours   Patient not taking: Reported on 6/4/2024   OXcarbazepine (TRILEPTAL) 300 mg tablet  Self Yes No   Sig: Take 300 mg by mouth every 12 (twelve) hours   Patient not taking: Reported on 9/18/2024   atoMOXetine (STRATTERA) 40 mg capsule  Self No No   Sig: Take 1 capsule (40 mg total) by mouth daily   Patient not taking: Reported on 6/4/2024   atoMOXetine (STRATTERA) 60 mg capsule   Yes No   Sig: Take 60 mg by mouth daily   atorvastatin (LIPITOR) 10 mg tablet   No No   Sig: Take 1 tablet (10 mg total) by mouth daily   benztropine (COGENTIN) 1 mg tablet   Yes No   buPROPion (FORFIVO XL) 450 MG 24 hr tablet  Self No No   Sig: Take 1 tablet (450 mg total) by mouth daily   Patient not taking: Reported on 6/4/2024   buPROPion (WELLBUTRIN XL) 150 mg 24 hr tablet   Yes No   cloNIDine (CATAPRES) 0.2 mg tablet   No No   Sig: TAKE 1 TABLET (0.2 MG TOTAL) BY MOUTH EVERY 12 (TWELVE) HOURS   cyanocobalamin (VITAMIN B-12) 1000 MCG tablet   No No   Sig: Take 1 tablet (1,000 mcg total) by mouth daily   enoxaparin (LOVENOX) 150 mg/mL injection   No No   Sig: Inject 0.9 mL (135 mg total) under the skin every 12 (twelve) hours for 10 days Start with your coumadin post  surgery as discussed   furosemide (LASIX) 20 mg tablet   No No   Sig: Take 1 tablet (20 mg total) by mouth daily   levocetirizine (XYZAL) 5 MG tablet   No No   Sig: TAKE ONE TABLET BY MOUTH IN THE EVENING DAILY   levothyroxine (Euthyrox) 125 mcg tablet   No No   Sig: Take 1 tablet (125 mcg total) by mouth daily in the early morning   losartan (COZAAR) 100 MG tablet   No No   Sig: Take 1 tablet (100 mg total) by mouth daily   lurasidone (LATUDA) 40 mg tablet  Self Yes No   metFORMIN (GLUCOPHAGE-XR) 750 mg 24 hr tablet   No No   Sig: Take 1 tablet (750 mg total) by mouth daily with breakfast   methylPREDNISolone 4 MG tablet therapy pack   No No   Sig: Use as directed on package   naltrexone (REVIA) 50 mg tablet   No No   Sig: TAKE 1 TABLET BY MOUTH DAILY   naproxen (Naprosyn) 500 mg tablet   No No   Sig: Take 1 tablet (500 mg total) by mouth every 12 (twelve) hours as needed (pain) for up to 10 days Take with food.   nitrofurantoin (MACROBID) 100 mg capsule   Yes No   oxybutynin (DITROPAN) 5 mg tablet   No No   Sig: Take 1 tablet (5 mg total) by mouth 2 (two) times a day   pantoprazole (PROTONIX) 40 mg tablet  Self No No   Sig: Take 1 tablet (40 mg total) by mouth daily in the early morning   pilocarpine (SALAGEN) 5 mg tablet   No No   Sig: TAKE 1 TABLET (5 MG TOTAL) BY MOUTH THREE (THREE) TIMES A DAY   topiramate (TOPAMAX) 200 MG tablet  Self No No   Sig: Take 1 tablet (200 mg total) by mouth 2 (two) times a day   traZODone (DESYREL) 100 mg tablet   Yes No   trospium chloride (SANCTURA) 20 mg tablet   Yes No   Sig: Take 20 mg by mouth 2 (two) times a day   warfarin (COUMADIN) 2 mg tablet   No No   Sig: take 3 & 1/2 tablets (7MG) once a day Saturday and Sunday and take 2 & 1/2 tablets (5MG) once a day Monday through Friday   warfarin (COUMADIN) 5 mg tablet   No No   Sig: Take 1 tablet (5 mg total) by mouth daily      Facility-Administered Medications: None     Discharge Medication List as of 9/29/2024  6:40 PM         CONTINUE these medications which have NOT CHANGED    Details   atoMOXetine (STRATTERA) 60 mg capsule Take 60 mg by mouth daily, Starting Wed 3/6/2024, Historical Med      atorvastatin (LIPITOR) 10 mg tablet Take 1 tablet (10 mg total) by mouth daily, Starting Wed 9/18/2024, Normal      Austedo XR 24 MG TB24 Historical Med      benztropine (COGENTIN) 1 mg tablet Historical Med      buPROPion (WELLBUTRIN XL) 150 mg 24 hr tablet Historical Med      Cholecalciferol (Vitamin D3) 125 MCG (5000 UT) capsule Take 1 capsule (5,000 Units total) by mouth daily, Starting Thu 4/4/2024, Normal      cloNIDine (CATAPRES) 0.2 mg tablet TAKE 1 TABLET (0.2 MG TOTAL) BY MOUTH EVERY 12 (TWELVE) HOURS, Starting Tue 8/6/2024, Until Sun 2/2/2025, Normal      cyanocobalamin (VITAMIN B-12) 1000 MCG tablet Take 1 tablet (1,000 mcg total) by mouth daily, Starting Thu 4/4/2024, Until Tue 10/1/2024, Normal      enoxaparin (LOVENOX) 150 mg/mL injection Inject 0.9 mL (135 mg total) under the skin every 12 (twelve) hours for 10 days Start with your coumadin post surgery as discussed, Starting Thu 8/8/2024, Until Sun 8/18/2024, Normal      furosemide (LASIX) 20 mg tablet Take 1 tablet (20 mg total) by mouth daily, Starting Wed 9/18/2024, Normal      levocetirizine (XYZAL) 5 MG tablet TAKE ONE TABLET BY MOUTH IN THE EVENING DAILY, Starting Wed 8/21/2024, Normal      levothyroxine (Euthyrox) 125 mcg tablet Take 1 tablet (125 mcg total) by mouth daily in the early morning, Starting Wed 9/18/2024, Normal      losartan (COZAAR) 100 MG tablet Take 1 tablet (100 mg total) by mouth daily, Starting Wed 9/18/2024, Normal      lurasidone (LATUDA) 40 mg tablet Historical Med      metFORMIN (GLUCOPHAGE-XR) 750 mg 24 hr tablet Take 1 tablet (750 mg total) by mouth daily with breakfast, Starting Wed 9/18/2024, Normal      methylPREDNISolone 4 MG tablet therapy pack Use as directed on package, Normal      naltrexone (REVIA) 50 mg tablet TAKE 1 TABLET BY MOUTH  DAILY, Starting Tue 8/6/2024, Normal      naproxen (Naprosyn) 500 mg tablet Take 1 tablet (500 mg total) by mouth every 12 (twelve) hours as needed (pain) for up to 10 days Take with food., Starting Wed 7/10/2024, Until Thu 7/25/2024 at 2359, Normal      nitrofurantoin (MACROBID) 100 mg capsule Historical Med      OXcarbazepine (TRILEPTAL) 300 mg tablet Take 300 mg by mouth every 12 (twelve) hours, Starting Fri 2/2/2024, Historical Med      oxybutynin (DITROPAN) 5 mg tablet Take 1 tablet (5 mg total) by mouth 2 (two) times a day, Starting Thu 4/4/2024, Until Fri 8/2/2024, Normal      pantoprazole (PROTONIX) 40 mg tablet Take 1 tablet (40 mg total) by mouth daily in the early morning, Starting Thu 4/4/2024, Normal      pilocarpine (SALAGEN) 5 mg tablet TAKE 1 TABLET (5 MG TOTAL) BY MOUTH THREE (THREE) TIMES A DAY, Normal      topiramate (TOPAMAX) 200 MG tablet Take 1 tablet (200 mg total) by mouth 2 (two) times a day, Starting Tue 1/30/2024, Until Thu 7/25/2024, Normal      traZODone (DESYREL) 100 mg tablet Historical Med      trospium chloride (SANCTURA) 20 mg tablet Take 20 mg by mouth 2 (two) times a day, Starting Mon 7/8/2024, Until Tue 7/8/2025, Historical Med      !! warfarin (COUMADIN) 2 mg tablet take 3 & 1/2 tablets (7MG) once a day Saturday and Sunday and take 2 & 1/2 tablets (5MG) once a day Monday through Friday, Normal      !! warfarin (COUMADIN) 5 mg tablet Take 1 tablet (5 mg total) by mouth daily, Starting Wed 7/24/2024, Until Mon 1/20/2025, Normal       !! - Potential duplicate medications found. Please discuss with provider.        No discharge procedures on file.  ED SEPSIS DOCUMENTATION   Time reflects when diagnosis was documented in both MDM as applicable and the Disposition within this note       Time User Action Codes Description Comment    9/29/2024  6:13 PM Christina Gardner [M79.609] Pain in limb     9/29/2024  6:56 PM Christina Gardner [R22.9] Soft tissue swelling                   Christina Gardner,   09/29/24 1855

## 2024-10-03 ENCOUNTER — TELEPHONE (OUTPATIENT)
Dept: HEMATOLOGY ONCOLOGY | Facility: CLINIC | Age: 54
End: 2024-10-03

## 2024-10-03 ENCOUNTER — APPOINTMENT (OUTPATIENT)
Dept: LAB | Facility: CLINIC | Age: 54
End: 2024-10-03
Payer: COMMERCIAL

## 2024-10-03 ENCOUNTER — TELEPHONE (OUTPATIENT)
Dept: OBGYN CLINIC | Facility: CLINIC | Age: 54
End: 2024-10-03

## 2024-10-03 ENCOUNTER — OFFICE VISIT (OUTPATIENT)
Dept: OBGYN CLINIC | Facility: CLINIC | Age: 54
End: 2024-10-03
Payer: COMMERCIAL

## 2024-10-03 VITALS
HEIGHT: 66 IN | DIASTOLIC BLOOD PRESSURE: 75 MMHG | SYSTOLIC BLOOD PRESSURE: 144 MMHG | BODY MASS INDEX: 47.09 KG/M2 | HEART RATE: 75 BPM | WEIGHT: 293 LBS

## 2024-10-03 DIAGNOSIS — M25.512 ACUTE PAIN OF LEFT SHOULDER: ICD-10-CM

## 2024-10-03 DIAGNOSIS — Z86.711 HISTORY OF PULMONARY EMBOLISM: ICD-10-CM

## 2024-10-03 DIAGNOSIS — Z86.718 HISTORY OF DVT (DEEP VEIN THROMBOSIS): ICD-10-CM

## 2024-10-03 DIAGNOSIS — M75.32 CALCIFIC TENDONITIS OF LEFT SHOULDER: Primary | ICD-10-CM

## 2024-10-03 DIAGNOSIS — Z51.81 ANTICOAGULATION MANAGEMENT ENCOUNTER: ICD-10-CM

## 2024-10-03 DIAGNOSIS — Z79.01 ANTICOAGULATION MANAGEMENT ENCOUNTER: ICD-10-CM

## 2024-10-03 LAB
INR PPP: 1.29 (ref 0.85–1.19)
PROTHROMBIN TIME: 16.3 SECONDS (ref 12.3–15)

## 2024-10-03 PROCEDURE — 99213 OFFICE O/P EST LOW 20 MIN: CPT | Performed by: ORTHOPAEDIC SURGERY

## 2024-10-03 PROCEDURE — 36415 COLL VENOUS BLD VENIPUNCTURE: CPT

## 2024-10-03 PROCEDURE — 85610 PROTHROMBIN TIME: CPT

## 2024-10-03 RX ORDER — NAPROXEN 500 MG/1
500 TABLET ORAL 2 TIMES DAILY WITH MEALS
Qty: 30 TABLET | Refills: 0 | Status: SHIPPED | OUTPATIENT
Start: 2024-10-03

## 2024-10-03 RX ORDER — METHYLPREDNISOLONE 4 MG
TABLET, DOSE PACK ORAL
Qty: 21 TABLET | Refills: 0 | Status: SHIPPED | OUTPATIENT
Start: 2024-10-03 | End: 2024-10-03 | Stop reason: SDUPTHER

## 2024-10-03 NOTE — PROGRESS NOTES
"ORTHO CARE [facility] ORTHOPEDIC SPECIALISTS [address] [address] [phone]       Blanca Mason  [id number]  1970    ORTHOPAEDIC SURGERY OUTPATIENT NOTE  10/3/2024      HISTORY:  53 y.o. female presents for follow-up on her left shoulder.  We last saw her we performed a steroid injection to her left subacromial bursa.  She reports 6 weeks of relief but has had increased pain over the last few weeks.  She has done physical therapy in the past.  No new fall or trauma.  She is prediabetic.  She had a bunion surgery on 8/8/2024.  She had a concern for infection on 9/13/2024.  She is not currently on antibiotics and was recommended to wear a cam boot to offload her shoe rubbing from the incision.    Past Medical History:   Diagnosis Date    Acute deep vein thrombosis of lower leg, left ([facility]) 10/16/2023    Acute heart failure, unspecified heart failure type ([facility]) 05/06/2024    Anxiety     Arthritis     oa cassandra knees    Asthma     good control- no medications    Yan's esophagus     Bipolar affective disorder ([facility])     Cannabis abuse with unspecified 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[facility])     Chronic pain disorder     lumbar    Contusion of elbow 12/21/2021    COPD (chronic obstructive pulmonary disease) ([facility])     CPAP (continuous positive airway pressure) dependence     DDD (degenerative disc disease), lumbar 04/09/2015    Degenerative disc disease at L5-S1 level     Deliberate self-cutting     9/15/22per pt has not done recently-- does see a therapist and psychiatrist regularly    Depression 09/16/2008    Diarrhea 01/06/2022    Disease of thyroid gland     hypo    MARTINEZ (dyspnea on exertion)     per pt \"has had this with exertion and not new\"    Drug overdose 10/28/2008    suicide attempt and again drug overdose 7/2022-- [facility] floor and than transferred to [facility]    Dysphagia     Dyspnea     Ecchymosis 01/06/2022    Edema     BLE    Elevated " troponin 09/02/2023    Family history of blood clots     GERD (gastroesophageal reflux disease)     Grief 11/11/2023    Headache(784.0)     Headache, tension-type     Hemorrhage of rectum and anus 10/02/2017    Formatting of this note might be different from the original.  Added automatically from request for surgery [id number]    High blood pressure     History of COVID-19 12/30/2020    Symptoms started on 12/30/2020 and then got worse.  Today she is feeling a little bit better.  She is a candidate for monoclonal antibody infusion and set for 1/6/21 @ 1pm at [facility]. 01/07/21 - telemedicine -     Hyperlipidemia     Hypertension     Ingrown toenail 12/02/2023    Intentional overdose (HCC) 09/27/2023    Intentional self-harm by unspecified sharp object, sequela (HCC) 02/09/2023    Knee pain, bilateral     Especially right    Knee pain, right 02/23/2022    Medial epicondylitis of elbow, left 04/03/2018    Formatting of this note might be different from the original.  Added automatically from request for surgery [id number]    Medial epicondylitis of right elbow 07/17/2023    Medical marijuana use     Memory difficulties 08/06/2020    Memory loss     Migraines     Obesity     Other psychoactive substance abuse with unspecified psychoactive substance-induced disorder (HCC) 05/06/2024    Overactive bladder     Polysubstance abuse (HCC) 09/29/2023    Potential for cognitive impairment 06/17/2021    Primary osteoarthritis of both knees 08/08/2018    Pulmonary emboli (HCC)     Restrictive lung disease 02/01/2017    Last Assessment & Plan:   Formatting of this note might be different from the original.  I reviewed this problem throughout the rest of the note. Please refer to the other assessments for details.    Sjogren's disease (HCC)     Sleep apnea     Stress incontinence     Suicidal deliberate poisoning (HCC) 07/13/2022    Suicidal ideations     Teeth missing     Toxic encephalopathy 11/08/2023    Tremor     per pt  Tremors of Right Leg and both Arms    Visual impairment     Wears glasses        Past Surgical History:   Procedure Laterality Date    BREAST CYST EXCISION Right 1989    CARPAL TUNNEL RELEASE Left     CHOLECYSTECTOMY  05/2003    Laparoscopic    COLONOSCOPY      01/12/2009    DILATION AND CURETTAGE OF UTERUS      ELBOW SURGERY Left     x2. No hardware    ESOPHAGOGASTRODUODENOSCOPY      FOOT SURGERY Left     Plantar fasciotomy    HERNIA REPAIR      HYSTERECTOMY      laporoscopic, partial    KNEE ARTHROSCOPY Bilateral     OOPHORECTOMY Left 10/2015    FL CORRJ HLX VLGS BNCTY SESMDC JOINT ARTHRODESIS Right 8/2/2024    Procedure: RIGHT FOOT LAPIDUS BUNIONECTOMY, 2ND HAMMERTOE REPAIR, SECOND METATARSAL OSTEOTOMY WITH SECOND PLANTAR PLATE REPAIR;  Surgeon: Kaz Bautista DPM;  Location: [facility] MAIN OR;  Service: Podiatry    FL GASTROCNEMIUS RECESSION Left 02/24/2021    Procedure: RECESSION GASTROC OPEN;  Surgeon: Nomi Yang DPM;  Location:  MAIN OR;  Service: Podiatry    FL RPR UMBILICAL HRNA 5 YRS/> REDUCIBLE N/A 04/24/2019    Procedure: REPAIR HERNIA UMBILICAL LAPAROSCOPIC W/ ROBOTICS;  Surgeon: Anahi Colon MD;  Location: [facility] Main OR;  Service: General    FL SPHINCTEROTOMY ANAL DIVISION SPHINCTER SPX N/A 08/31/2018    Procedure: EUA, LEFT PARTIAL INTERNAL SPHINCTEROTOMY;  Surgeon: Tien Reyes MD;  Location:  MAIN OR;  Service: Colorectal    FL TNOT ELBOW LATERAL/MEDIAL DEBRIDE OPEN TDN RPR Left 09/20/2022    Procedure: RELEASE EPICONDYLAR ELBOW MEDIAL;  Surgeon: Kyree Winkler MD;  Location: [facility] OR;  Service: Orthopedics    REDUCTION MAMMAPLASTY Bilateral 1999    REPAIR LACERATION Left     left hand-5/18/2009    REPLACEMENT TOTAL KNEE Right     ROTATOR CUFF REPAIR Left     TONSILECTOMY AND ADNOIDECTOMY      US GUIDED MSK PROCEDURE  04/22/2021    VASCULAR SURGERY      VEIN LIGATION Bilateral     WISDOM TOOTH EXTRACTION         Social History     Socioeconomic History    Marital status:  Single     Spouse name: Not on file    Number of children: Not on file    Years of education: Not on file    Highest education level: Not on file   Occupational History    Not on file   Tobacco Use    Smoking status: Former     Current packs/day: 0.00     Average packs/day: 2.0 packs/day for 33.0 years (66.0 ttl pk-yrs)     Types: Cigarettes     Start date: 1985     Quit date: 2018     Years since quittin.7    Smokeless tobacco: Never    Tobacco comments:     Started at age 16.   Vaping Use    Vaping status: Some Days    Substances: THC   Substance and Sexual Activity    Alcohol use: Not Currently     Comment: Recovering alcoholic    Drug use: Yes     Types: Marijuana     Comment: Former meth use, medicinal marijuana    Sexual activity: Not Currently     Partners: Male     Comment: defer   Other Topics Concern    Not on file   Social History Narrative    Not on file     Social Determinants of Health     Financial Resource Strain: Low Risk  (2024)    Overall Financial Resource Strain (CARDIA)     Difficulty of Paying Living Expenses: Not hard at all   Food Insecurity: No Food Insecurity (2024)    Hunger Vital Sign     Worried About Running Out of Food in the Last Year: Never true     Ran Out of Food in the Last Year: Never true   Transportation Needs: No Transportation Needs (2024)    PRAPARE - Transportation     Lack of Transportation (Medical): No     Lack of Transportation (Non-Medical): No   Physical Activity: Not on file   Stress: Not on file   Social Connections: Not on file   Intimate Partner Violence: Not on file   Housing Stability: Unknown (2024)    Housing Stability Vital Sign     Unable to Pay for Housing in the Last Year: No     Number of Times Moved in the Last Year: Not on file     Homeless in the Last Year: No       Family History   Problem Relation Age of Onset    Kidney cancer Mother     Diabetes Mother     Depression Mother     Stroke Mother     Arthritis Mother      Cancer Mother     Psychiatric Illness Mother     No Known Problems Father     No Known Problems Sister     No Known Problems Maternal Grandmother     No Known Problems Maternal Grandfather     No Known Problems Paternal Grandmother     No Known Problems Paternal Grandfather     Colon cancer Maternal Uncle     Colon cancer Maternal Uncle     Colon cancer Paternal Uncle     Colon cancer Family     Alcohol abuse Sister     Asthma Sister         Patient's Medications   New Prescriptions    No medications on file   Previous Medications    ATOMOXETINE (STRATTERA) 40 MG CAPSULE    Take 1 capsule (40 mg total) by mouth daily    ATOMOXETINE (STRATTERA) 60 MG CAPSULE    Take 60 mg by mouth daily    ATORVASTATIN (LIPITOR) 10 MG TABLET    Take 1 tablet (10 mg total) by mouth daily    AUSTEDO XR 24 MG TB24        BENZTROPINE (COGENTIN) 1 MG TABLET        BUPROPION (FORFIVO XL) 450 MG 24 HR TABLET    Take 1 tablet (450 mg total) by mouth daily    BUPROPION (WELLBUTRIN XL) 150 MG 24 HR TABLET        CHOLECALCIFEROL (VITAMIN D3) 125 MCG (5000 UT) CAPSULE    Take 1 capsule (5,000 Units total) by mouth daily    CLONIDINE (CATAPRES) 0.2 MG TABLET    TAKE 1 TABLET (0.2 MG TOTAL) BY MOUTH EVERY 12 (TWELVE) HOURS    CYANOCOBALAMIN (VITAMIN B-12) 1000 MCG TABLET    Take 1 tablet (1,000 mcg total) by mouth daily    ENOXAPARIN (LOVENOX) 150 MG/ML INJECTION    Inject 0.9 mL (135 mg total) under the skin every 12 (twelve) hours for 10 days Start with your coumadin post surgery as discussed    FUROSEMIDE (LASIX) 20 MG TABLET    Take 1 tablet (20 mg total) by mouth daily    LEVOCETIRIZINE (XYZAL) 5 MG TABLET    TAKE ONE TABLET BY MOUTH IN THE EVENING DAILY    LEVOTHYROXINE (EUTHYROX) 125 MCG TABLET    Take 1 tablet (125 mcg total) by mouth daily in the early morning    LOSARTAN (COZAAR) 100 MG TABLET    Take 1 tablet (100 mg total) by mouth daily    LURASIDONE (LATUDA) 40 MG TABLET        METFORMIN (GLUCOPHAGE-XR) 750 MG 24 HR TABLET    Take 1  "tablet (750 mg total) by mouth daily with breakfast    NALTREXONE (REVIA) 50 MG TABLET    TAKE 1 TABLET BY MOUTH DAILY    NITROFURANTOIN (MACROBID) 100 MG CAPSULE        OXCARBAZEPINE (TRILEPTAL) 150 MG TABLET    Take 3 tablets (450 mg total) by mouth every 12 (twelve) hours    OXCARBAZEPINE (TRILEPTAL) 300 MG TABLET    Take 300 mg by mouth every 12 (twelve) hours    OXYBUTYNIN (DITROPAN) 5 MG TABLET    Take 1 tablet (5 mg total) by mouth 2 (two) times a day    PANTOPRAZOLE (PROTONIX) 40 MG TABLET    Take 1 tablet (40 mg total) by mouth daily in the early morning    PILOCARPINE (SALAGEN) 5 MG TABLET    TAKE 1 TABLET (5 MG TOTAL) BY MOUTH THREE (THREE) TIMES A DAY    TOPIRAMATE (TOPAMAX) 200 MG TABLET    Take 1 tablet (200 mg total) by mouth 2 (two) times a day    TRAZODONE (DESYREL) 100 MG TABLET        TROSPIUM CHLORIDE (SANCTURA) 20 MG TABLET    Take 20 mg by mouth 2 (two) times a day    WARFARIN (COUMADIN) 2 MG TABLET    take 3 & 1/2 tablets (7MG) once a day Saturday and Sunday and take 2 & 1/2 tablets (5MG) once a day Monday through Friday    WARFARIN (COUMADIN) 5 MG TABLET    Take 1 tablet (5 mg total) by mouth daily   Modified Medications    Modified Medication Previous Medication    NAPROXEN (NAPROSYN) 500 MG TABLET naproxen (Naprosyn) 500 mg tablet       Take 1 tablet (500 mg total) by mouth 2 (two) times a day with meals Take with food.    Take 1 tablet (500 mg total) by mouth every 12 (twelve) hours as needed (pain) for up to 10 days Take with food.   Discontinued Medications    METHYLPREDNISOLONE 4 MG TABLET THERAPY PACK    Use as directed on package       Allergies   Allergen Reactions    Percocet [Oxycodone-Acetaminophen] Headache     Severe headaches   (denies issues with Tylenol)    Povidone Iodine Rash     Unsure if betadine or gauze post surgical. Got rash on leg.   Has  Had itchy rashes after every surgery prep and IV insertion    Chlorhexidine Rash     Per pt \"able to use the liquid soap--thinkd " "reaction from the sponges or wipes\"        /75 (BP Location: Left arm, Patient Position: Sitting, Cuff Size: Standard)   Pulse 75   Ht 5' 6\" (1.676 m)   Wt (!) 137 kg (303 lb)   BMI 48.91 kg/m²      REVIEW OF SYSTEMS:  Constitutional: Negative.    HEENT: Negative.    Respiratory: Negative.    Skin: Negative.    Neurological: Negative.    Psychiatric/Behavioral: Negative.  Musculoskeletal: Negative except for that mentioned in the HPI.    Gen: No acute distress, resting comfortably in bed  HEENT: Eyes clear, moist mucus membranes, hearing intact  Respiratory: No audible wheezing or stridor  Cardiovascular: Well Perfused peripherally, 2+ distal pulse  Abdomen: nondistended, no peritoneal signs     PHYSICAL EXAM:    LEFT SHOULDER:        Forward flexion:   140 degrees   Abduction:  90 degrees   External rotation at 0 degrees:   50 degrees   Internal rotation: left buttock     STRENGTH:  Forward flexion:  5/5   External rotation:  5/5   Internal rotation:  5/5        Speed test: positive  Yergason's: Negative   Tender to palpation ACJ (acromioclavicular joint): Negative   Tender to palpation LHB (long head of biceps): mildly positive  Link test: Negative  Warsaw test: Negative  Hornblower's: Negative  Lift off: Negative  Belly press: Negative  Bear hug: Negative  External lag sign: Negative  Cross-body adduction: Negative  Sulcus sign: Negative  Leonor's test: Negative  Drop arm test: negative    Radial/median/ulnar nerve intact    <2 sec cap refill        IMAGING:  none    ASSESSMENT AND PLAN:  53 y.o. female with left shoulder calcific tendinitis.  Her range of motion has improved but she continues with pain.  She is too early for another injection.  Given her recent bunion surgery and foot infection we will treat her with prescription of naproxen.  She will follow-up in 6 weeks.  She was given a home exercise treatment plan.      "

## 2024-10-03 NOTE — TELEPHONE ENCOUNTER
Spoke with Blanca and informed her INR is subtherapeutic, 1.29.  She is currently taking Coumadin 5 mg daily.  Recommend she increase it to 7 mg daily.  We will recheck her INR next Thursday.  Patient voiced understanding.

## 2024-10-03 NOTE — TELEPHONE ENCOUNTER
Spoke to Pt after she left the office. ROMAN Causey stated they are changing her medicine because of her foot infection. Pt will be taking Neproxin instead of a steroid.    Pt thanked us for the call and the update.

## 2024-10-10 ENCOUNTER — APPOINTMENT (OUTPATIENT)
Dept: LAB | Facility: CLINIC | Age: 54
End: 2024-10-10
Payer: COMMERCIAL

## 2024-10-10 DIAGNOSIS — Z79.01 ANTICOAGULATION MANAGEMENT ENCOUNTER: ICD-10-CM

## 2024-10-10 DIAGNOSIS — Z86.718 HISTORY OF DVT (DEEP VEIN THROMBOSIS): ICD-10-CM

## 2024-10-10 DIAGNOSIS — Z51.81 ANTICOAGULATION MANAGEMENT ENCOUNTER: ICD-10-CM

## 2024-10-10 DIAGNOSIS — Z86.711 HISTORY OF PULMONARY EMBOLISM: ICD-10-CM

## 2024-10-10 LAB
INR PPP: 3.66 (ref 0.85–1.19)
PROTHROMBIN TIME: 35.8 SECONDS (ref 12.3–15)

## 2024-10-10 PROCEDURE — 85610 PROTHROMBIN TIME: CPT

## 2024-10-10 PROCEDURE — 36415 COLL VENOUS BLD VENIPUNCTURE: CPT

## 2024-10-11 ENCOUNTER — TELEPHONE (OUTPATIENT)
Dept: HEMATOLOGY ONCOLOGY | Facility: CLINIC | Age: 54
End: 2024-10-11

## 2024-10-11 NOTE — TELEPHONE ENCOUNTER
Spoke with Blanca, she had an appointment however, she cancelled it.  Her INR is 3.66 and patient is currently on Coumadin 7 mg daily.  Recommend she cut back to 5 mg daily.  Recheck INR next week. Patient voiced understanding.

## 2024-10-18 ENCOUNTER — TELEPHONE (OUTPATIENT)
Dept: HEMATOLOGY ONCOLOGY | Facility: CLINIC | Age: 54
End: 2024-10-18

## 2024-10-18 ENCOUNTER — APPOINTMENT (OUTPATIENT)
Dept: LAB | Facility: CLINIC | Age: 54
End: 2024-10-18
Payer: COMMERCIAL

## 2024-10-18 DIAGNOSIS — Z79.01 ANTICOAGULATION MANAGEMENT ENCOUNTER: ICD-10-CM

## 2024-10-18 DIAGNOSIS — Z86.718 HISTORY OF DVT (DEEP VEIN THROMBOSIS): ICD-10-CM

## 2024-10-18 DIAGNOSIS — Z51.81 ANTICOAGULATION MANAGEMENT ENCOUNTER: ICD-10-CM

## 2024-10-18 DIAGNOSIS — Z86.711 HISTORY OF PULMONARY EMBOLISM: ICD-10-CM

## 2024-10-18 LAB
INR PPP: 1.81 (ref 0.85–1.19)
PROTHROMBIN TIME: 21.1 SECONDS (ref 12.3–15)

## 2024-10-18 PROCEDURE — 85610 PROTHROMBIN TIME: CPT

## 2024-10-18 PROCEDURE — 36415 COLL VENOUS BLD VENIPUNCTURE: CPT

## 2024-10-18 NOTE — TELEPHONE ENCOUNTER
Spoke with Blanca and informed her that her INR is 1.81.  Patient is currently taking Coumadin 5 mg daily.  Informed her to increase to 7 mg today, tomorrow and then drop back down to 5 mg.  She will obtain her PT/INR next week.    Patient is also going to be getting an EGD.  She will let me know when it is so we can discuss holding her Coumadin and bridging with Lovenox thereafter.  Patient voiced understanding.

## 2024-10-23 ENCOUNTER — APPOINTMENT (OUTPATIENT)
Dept: LAB | Facility: CLINIC | Age: 54
End: 2024-10-23
Payer: COMMERCIAL

## 2024-10-23 ENCOUNTER — TELEPHONE (OUTPATIENT)
Dept: HEMATOLOGY ONCOLOGY | Facility: CLINIC | Age: 54
End: 2024-10-23

## 2024-10-23 DIAGNOSIS — Z86.711 HISTORY OF PULMONARY EMBOLISM: ICD-10-CM

## 2024-10-23 DIAGNOSIS — Z51.81 ANTICOAGULATION MANAGEMENT ENCOUNTER: ICD-10-CM

## 2024-10-23 DIAGNOSIS — Z79.01 ANTICOAGULATION MANAGEMENT ENCOUNTER: ICD-10-CM

## 2024-10-23 DIAGNOSIS — Z86.718 HISTORY OF DVT (DEEP VEIN THROMBOSIS): ICD-10-CM

## 2024-10-23 LAB
INR PPP: 3.58 (ref 0.85–1.19)
PROTHROMBIN TIME: 35.3 SECONDS (ref 12.3–15)

## 2024-10-23 PROCEDURE — 85610 PROTHROMBIN TIME: CPT

## 2024-10-23 PROCEDURE — 36415 COLL VENOUS BLD VENIPUNCTURE: CPT

## 2024-10-23 NOTE — TELEPHONE ENCOUNTER
SPoke with patient regarding her INR, INR is 3.58.  She is currently taking Coumadin 5 mg daily.  Recommend she hold her Coumadin tomorrow and restart at 5 mg with INR check next week.  Patient voiced understanding.

## 2024-10-29 DIAGNOSIS — F25.0 SCHIZOAFFECTIVE DISORDER, BIPOLAR TYPE (HCC): Chronic | ICD-10-CM

## 2024-10-29 RX ORDER — CLONIDINE HYDROCHLORIDE 0.2 MG/1
0.2 TABLET ORAL EVERY 12 HOURS
Qty: 180 TABLET | Refills: 0 | Status: SHIPPED | OUTPATIENT
Start: 2024-10-29

## 2024-10-30 ENCOUNTER — APPOINTMENT (OUTPATIENT)
Dept: LAB | Facility: CLINIC | Age: 54
End: 2024-10-30
Payer: COMMERCIAL

## 2024-10-30 ENCOUNTER — TELEPHONE (OUTPATIENT)
Dept: HEMATOLOGY ONCOLOGY | Facility: CLINIC | Age: 54
End: 2024-10-30

## 2024-10-30 DIAGNOSIS — Z86.711 HISTORY OF PULMONARY EMBOLISM: ICD-10-CM

## 2024-10-30 DIAGNOSIS — Z86.718 HISTORY OF DVT (DEEP VEIN THROMBOSIS): ICD-10-CM

## 2024-10-30 DIAGNOSIS — Z79.01 ANTICOAGULATION MANAGEMENT ENCOUNTER: ICD-10-CM

## 2024-10-30 DIAGNOSIS — Z51.81 ANTICOAGULATION MANAGEMENT ENCOUNTER: ICD-10-CM

## 2024-10-30 LAB
INR PPP: 1.72 (ref 0.85–1.19)
PROTHROMBIN TIME: 20.3 SECONDS (ref 12.3–15)

## 2024-10-30 PROCEDURE — 36415 COLL VENOUS BLD VENIPUNCTURE: CPT

## 2024-10-30 PROCEDURE — 85610 PROTHROMBIN TIME: CPT

## 2024-10-30 NOTE — TELEPHONE ENCOUNTER
Spoke with patient to review her INR of 1.72.  Recommend she take Coumadin 7 mg today and tomorrow and drop back to 5.  We will recheck her INR next week.  Patient voiced understanding.  I will call her next week.

## 2024-11-01 ENCOUNTER — TELEPHONE (OUTPATIENT)
Dept: HEMATOLOGY ONCOLOGY | Facility: CLINIC | Age: 54
End: 2024-11-01

## 2024-11-01 NOTE — TELEPHONE ENCOUNTER
Patient called the RX Refill Line. Message is being forwarded to the office.     Patient is requesting more scripts for her Protime INR be sent into the lab. She stated that the last time she was there, there was only one script left on file.      Please contact patient at   [phone]

## 2024-11-04 DIAGNOSIS — Z86.711 HISTORY OF PULMONARY EMBOLISM: Primary | ICD-10-CM

## 2024-11-04 NOTE — TELEPHONE ENCOUNTER
Left VM for patient to let her know that orders were put in for weekly INRs to be drawn. Callback number left for patient if needed.

## 2024-11-06 ENCOUNTER — TELEPHONE (OUTPATIENT)
Age: 54
End: 2024-11-06

## 2024-11-06 NOTE — TELEPHONE ENCOUNTER
Call received from patient. Reviewed anticoagulation instructions per Heather Ramirez for her procedure. Patient is questioning what the dose of lovenox injections are going to be, due to her having some injections at home still from her previous surgery in August. Asking if she can have injections ordered for her prior to her procedure, or does the surgeon order that for her.

## 2024-11-06 NOTE — TELEPHONE ENCOUNTER
Woke with Blanca and informed her that tonight will be her last dose issue has to be off Coumadin 5 days prior to the procedure.  She will restart Coumadin at the same dose, 5 mg when her surgeon clears her to start it.  Same day she starts that she will start Lovenox twice daily.  Bridge her with Lovenox.  She has done the bridging before and has given herself injections therefore she is aware of the procedure.  We will recheck her INR 1 week after she starts the bridging.  I will call her on the 13th to check in and see if she has started her anticoagulation.  Patient voiced understanding.  Currently patient has Lovenox enough for 10 days.

## 2024-11-06 NOTE — TELEPHONE ENCOUNTER
LVM for patient to call back so I can give her the instructions below.  Provided hopeline number.    Patient will stop Coumadin 5 days prior to surgery, therefore, she will NOT take her dose today until her procedure on 11/12  She will resume anticoagulation per her surgeon's discretion when homeostasis is achieved  When started on Coumadin by her surgeon, patient will restart at her current dose and start Lovenox injections the same day twice daily  We will bridge with Lovenox 1 mg/kg twice daily until INR therapeutic between 2 and 3  Patient already aware on how to give herself injections as she has done it in the past.

## 2024-11-07 ENCOUNTER — APPOINTMENT (OUTPATIENT)
Dept: LAB | Facility: CLINIC | Age: 54
End: 2024-11-07
Payer: COMMERCIAL

## 2024-11-07 DIAGNOSIS — Z86.711 HISTORY OF PULMONARY EMBOLISM: ICD-10-CM

## 2024-11-07 DIAGNOSIS — Z51.81 ANTICOAGULATION MANAGEMENT ENCOUNTER: ICD-10-CM

## 2024-11-07 DIAGNOSIS — Z79.01 ANTICOAGULATION MANAGEMENT ENCOUNTER: ICD-10-CM

## 2024-11-07 DIAGNOSIS — Z86.718 HISTORY OF DVT (DEEP VEIN THROMBOSIS): ICD-10-CM

## 2024-11-07 LAB
INR PPP: 3.77 (ref 0.85–1.19)
PROTHROMBIN TIME: 36.6 SECONDS (ref 12.3–15)

## 2024-11-07 PROCEDURE — 85610 PROTHROMBIN TIME: CPT

## 2024-11-07 PROCEDURE — 36415 COLL VENOUS BLD VENIPUNCTURE: CPT

## 2024-11-13 ENCOUNTER — HOSPITAL ENCOUNTER (OUTPATIENT)
Dept: RADIOLOGY | Age: 54
Discharge: HOME | End: 2024-11-13

## 2024-11-13 DIAGNOSIS — F17.211 CIGARETTE NICOTINE DEPENDENCE IN REMISSION: ICD-10-CM

## 2024-11-14 ENCOUNTER — DOCUMENTATION (OUTPATIENT)
Dept: CASE MANAGEMENT | Facility: HOSPITAL | Age: 54
End: 2024-11-14

## 2024-11-14 ENCOUNTER — TELEPHONE (OUTPATIENT)
Dept: HEMATOLOGY ONCOLOGY | Facility: CLINIC | Age: 54
End: 2024-11-14

## 2024-11-14 NOTE — TELEPHONE ENCOUNTER
Left a message for Blanca informing her I wanted to check in and see if she is bridging with Lovenox.  Informed her that I like to know when she started the Coumadin plus Lovenox so we can get the blood work checked appropriately.  Provided HOPE line number.

## 2024-11-14 NOTE — BEHAVIORAL HEALTH HIGH UTILIZER
Patient Name:Blanca Mason MRN:  [id number]         : 1970     Age: 54 y.o.    Sex: female   Utilization History:  (# of ED visits & IP admits; reasons)  Pt had 9 [facility]-ED visits from 2024- 2024. ED presentations were related to SI with no plan or with a plan to overdose on medications. Pt intentionally overdosed on Flexeril twice and Trazodone twice. Pt reports periodic non-compliance with her medications.       Medical presentations were related to chest pain, right knee pain, withdrawing from meth, asthma exacerbation, fecal impaction, abdominal pain, toe or leg pain, dyspnea, SOB, bilateral leg edema, acute Saddle PE, acute respiratory failure, left shoulder pain, elevated INR, cellulitis of right lower extremity, right foot pain.    Treatment Recommendations & Presentation:  Presentation in the ED:  Pt typically presents self to ED's. ED visits were related to SI with no plan or with a plan to overdose on medications. Pt intentionally overdosed on Flexeril twice and Trazodone twice. Pt reports periodic non-compliance with her medications.       Medical visits were related to chest pain, right knee pain, withdrawing from meth, asthma exacerbation, fecal impaction, abdominal pain, toe or leg pain, dyspnea, SOB, bilateral leg edema, acute Saddle PE, acute respiratory failure, elevated INR, cellulitis right lower extremity, right foot pain.                ED Recommendations: Following medical evaluation and treatment, establish acute risk versus pt's chronic behavioral disturbances. Please contact pt's [facility] ACT team to develop a safe discharge plan for pt to return home at [phone]. Pt should be contacting her ACT team when in Crisis and formulating an action plan to include distress tolerance coping skills.   Pt should continue her medical treatment with her multiple medical speciality providers.       Home Medication Regimen: see most recent documentation in Epic, you can also verify  pt's medications with her ACT team      Recent Medical Work Ups:  (include Psych testing or ECT)     Comprehensive labs - 2024  CT's -   Xrays -   VAS -   ECG - 2024    Inpatient Recommendations:  Collaborate with pt's community resources to develop a comprehensive discharge plan.             Right foot and toe surgery 2024  Outpatient recommendations:  Pt should continue her medical and mental health care with her community providers and take her medications as prescribed.         Situation/Relevant Background Info:   Pt is a 54 year old female with a diagnosis of Schizoaffective Disorder/ Bipolar type, Borderline Personality Disorder and a past history of meth abuse (clean since 2023 but uses medical marijuana) and numerous behavioral health hospitalizations.     Pt lives alone in an apartment that is close to where her father resides and has the [facility] ACT team. Pt's mother  a few years ago in  and pt reports this as a difficult issue for her.   Pt has experienced various medical issues in  and appears to be very preoccupied with her medical conditions, perceived medical issues and treatment and becomes frustrated if she feels she is not obtaining the medical care she needs in a timely manner. Pt has displayed demanding and agitated behaviors while on behavioral health units.  Pt was active in going to see her father in the community and helping him as well as working part-time approx 21 hours per week. It is unknown if pt is still working. Pt appears to have poor distress tolerance and impulsivity. Pt appears to want to control her discharge date from inpatient units.  Pt has multiple medical speciality providers in addition to her PCP office: [facility], [facility], [facility],  [facility], [facility],  [facility],  [facility].  Pt had right foot bunionectomy and hammertoe surgery 2024.                        Diagnoses/Significant Problems (Medical & Psychiatric):    Schizoaffective disorder, bipolar type, Borderline Personality Disorder  Active Problems:    Benign essential hypertension    Edema    Hypothyroidism    MAG    Overactive bladder    Sjogren's syndrome (HCC)    COPD (chronic obstructive pulmonary disease) ([facility])    Medical clearance for psychiatric admission    History of pulmonary embolism    Ingrown toenail                              Drivers of repeated utilization:  preoccupation with medical concerns and issues, personality disordered behaviors, limited to poor coping skills, impulsivity, secondary gains from ED visits and hospitalizations.                                                Existing Community Resources & Supports:                 father Luigi Mason - [phone]           Patient Medical & Psychiatric Care Team:  [facility] P[facility] - [phone]  [facility]  [facility]  [facility]  [facility]  [facility]  [facility]    Care plan date: 11/14/24                   Author:  Dennise Napier RN                 Date reviewed with patient:

## 2024-11-14 NOTE — TELEPHONE ENCOUNTER
Patient calling in returning Heather's call. Patient stated she started the lovenox yesterday evening.

## 2024-11-15 DIAGNOSIS — Z79.01 ANTICOAGULATION MANAGEMENT ENCOUNTER: Primary | ICD-10-CM

## 2024-11-15 DIAGNOSIS — Z51.81 ANTICOAGULATION MANAGEMENT ENCOUNTER: Primary | ICD-10-CM

## 2024-11-15 NOTE — TELEPHONE ENCOUNTER
Spoke with Blanca.  We are bridging her Coumadin with Lovenox.  She is taking 5 mg of Coumadin and Lovenox 1 mg/kg twice daily.  She will get an INR check on Monday and I will call her with the results.  Patient voiced understanding.

## 2024-11-18 ENCOUNTER — APPOINTMENT (OUTPATIENT)
Dept: LAB | Facility: CLINIC | Age: 54
End: 2024-11-18
Payer: COMMERCIAL

## 2024-11-18 ENCOUNTER — RESULTS FOLLOW-UP (OUTPATIENT)
Dept: PULMONOLOGY | Facility: CLINIC | Age: 54
End: 2024-11-18

## 2024-11-18 ENCOUNTER — TELEPHONE (OUTPATIENT)
Dept: HEMATOLOGY ONCOLOGY | Facility: CLINIC | Age: 54
End: 2024-11-18

## 2024-11-18 DIAGNOSIS — Z51.81 ANTICOAGULATION MANAGEMENT ENCOUNTER: ICD-10-CM

## 2024-11-18 DIAGNOSIS — Z86.711 HISTORY OF PULMONARY EMBOLISM: ICD-10-CM

## 2024-11-18 DIAGNOSIS — Z79.01 ANTICOAGULATION MANAGEMENT ENCOUNTER: ICD-10-CM

## 2024-11-18 LAB
INR PPP: 1.43 (ref 0.85–1.19)
PROTHROMBIN TIME: 17.6 SECONDS (ref 12.3–15)

## 2024-11-18 PROCEDURE — 85610 PROTHROMBIN TIME: CPT

## 2024-11-18 PROCEDURE — 36415 COLL VENOUS BLD VENIPUNCTURE: CPT

## 2024-11-18 NOTE — TELEPHONE ENCOUNTER
Left a message for Blanca to contact me, provided HOPE line number.  Need to give her Coumadin instructions.

## 2024-11-18 NOTE — TELEPHONE ENCOUNTER
"Plan for next visit: gait, review exercises    Subjective: \"I just want to be left alone.\"  Per grandson, patient has beendoing well all week until this morning.  He was really wobbly getting up to go to the bathroom earlier.  Have done all the exercises and he's been walking.    Falls since last visit: none  Home/Social Environment:   lives with wife in home with ramp to enter/ exit, has 24/7 CG's all of them are family    Assessment:  Patient with low BP but has been sleeping.  Recommended CG's keep eye on him all weekend and call on call doctor if patient continues to be wobbly." Spoke with about her INR of 1.43, it is subtherapeutic.  Patient reports she totally forgot about taking the Lovenox twice daily and has been taking it daily.  Recommend she increase her Coumadin dose to 7 mg today, tomorrow and Thursday and take Lovenox twice daily.  Today she will just give herself an injection this evening.  Starting tomorrow will be twice daily.  She will obtain a PT/INR first thing Friday morning so I can give her instructions for the weekend.  Patient voiced understanding.

## 2024-11-20 DIAGNOSIS — Z86.711 HISTORY OF PULMONARY EMBOLISM: ICD-10-CM

## 2024-11-20 DIAGNOSIS — Z86.718 HISTORY OF DVT (DEEP VEIN THROMBOSIS): ICD-10-CM

## 2024-11-20 RX ORDER — ENOXAPARIN SODIUM 150 MG/ML
1 INJECTION SUBCUTANEOUS EVERY 12 HOURS
Qty: 20 ML | Refills: 0 | OUTPATIENT
Start: 2024-11-20 | End: 2024-11-30

## 2024-11-20 NOTE — TELEPHONE ENCOUNTER
Patient called in regarding her lovenox injections that she is requesting, patient states she only has enough injections to last her until Friday and is wondering a status update regarding her rx. Medication was denied, please advise.

## 2024-11-21 NOTE — TELEPHONE ENCOUNTER
Inform patient that our plan was to get the INR on Friday morning.  I am hoping that she will not need the Lovenox past Friday as we are bridging her with Coumadin.  I will send it should her INR not be therapeutic.  Patient voiced understanding.

## 2024-11-21 NOTE — TELEPHONE ENCOUNTER
Patient called in again asking about the medication & needles being sent to the pharmacy.  She only has enough for today then she is out.  Please call her to discuss

## 2024-11-22 ENCOUNTER — APPOINTMENT (OUTPATIENT)
Dept: LAB | Facility: CLINIC | Age: 54
End: 2024-11-22
Payer: COMMERCIAL

## 2024-11-22 DIAGNOSIS — Z86.711 HISTORY OF PULMONARY EMBOLISM: ICD-10-CM

## 2024-11-22 LAB
INR PPP: 2.95 (ref 0.85–1.19)
PROTHROMBIN TIME: 30.4 SECONDS (ref 12.3–15)

## 2024-11-22 PROCEDURE — 36415 COLL VENOUS BLD VENIPUNCTURE: CPT

## 2024-11-22 PROCEDURE — 85610 PROTHROMBIN TIME: CPT

## 2024-11-27 ENCOUNTER — PROCEDURE VISIT (OUTPATIENT)
Dept: NEUROLOGY | Facility: CLINIC | Age: 54
End: 2024-11-27
Payer: COMMERCIAL

## 2024-11-27 VITALS — HEART RATE: 87 BPM | DIASTOLIC BLOOD PRESSURE: 83 MMHG | TEMPERATURE: 97.2 F | SYSTOLIC BLOOD PRESSURE: 145 MMHG

## 2024-11-27 DIAGNOSIS — G43.709 CHRONIC MIGRAINE WITHOUT AURA WITHOUT STATUS MIGRAINOSUS, NOT INTRACTABLE: Primary | ICD-10-CM

## 2024-11-27 PROCEDURE — 64615 CHEMODENERV MUSC MIGRAINE: CPT | Performed by: PHYSICIAN ASSISTANT

## 2024-11-27 NOTE — PROGRESS NOTES
"Universal Protocol   procedure performed by consultantConsent: Verbal consent obtained. Written consent obtained.  Risks and benefits: risks, benefits and alternatives were discussed  Consent given by: patient  Time out: Immediately prior to procedure a \"time out\" was called to verify the correct patient, procedure, equipment, support staff and site/side marked as required.  Patient understanding: patient states understanding of the procedure being performed  Patient consent: the patient's understanding of the procedure matches consent given  Procedure consent: procedure consent matches procedure scheduled  Relevant documents: relevant documents present and verified  Patient identity confirmed: verbally with patient      Chemodenervation     Date/Time  11/27/2024 8:30 AM     Performed by  Aria Toney PA-C   Authorized by  Aria Toney PA-C     Pre-procedure details      Preparation: Patient was prepped and draped in usual sterile fashion     Anesthesia  (see MAR for exact dosages):     Anesthesia method:  None   Procedure details      Position:  Upright   Botox      Botox Type:  Type A    Brand:  Botox    mL's of Botulinum Toxin:  200    mL's of preservative free sterile saline:  4    Final Concentration per CC:  50 units    Needle Gauge:  30 G 2.5 inch   Procedures      Botox Procedures: chronic headache     Injection Location      Head / Face:  L superior cervical paraspinal, R superior cervical paraspinal, L , R , R frontalis, L frontalis, R medial occipitalis, L medial occipitalis, procerus, R temporalis, L superior trapezius, R superior trapezius and L temporalis    L  injection amount:  5 unit(s)    R  injection amount:  5 unit(s)    L lateral frontalis:  5 unit(s)    R lateral frontalis:  5 unit(s)    L medial frontalis:  5 unit(s)    R medial frontalis:  5 unit(s)    L temporalis injection amount:  20 unit(s)    R temporalis injection amount:  20 unit(s)    Procerus " injection amount:  5 unit(s)    L medial occipitalis injection amount:  15 unit(s)    R medial occipitalis injection amount:  15 unit(s)    L superior cervical paraspinal injection amount:  10 unit(s)    R superior cervical paraspinal injection amount:  10 unit(s)    L superior trapezius injection amount:  15 unit(s)    R superior trapezius injection amount:  15 unit(s)   Total Units      Total units used:  200   Post-procedure details      Chemodenervation:  Chronic migraine    Facial Nerve Location::  Bilateral facial nerve    Patient tolerance of procedure:  Tolerated well, no immediate complications   Comments       10 units scm bilaterally  10 units occipitalis  15 units left splenius  All medically necessary

## 2024-11-29 ENCOUNTER — APPOINTMENT (OUTPATIENT)
Dept: LAB | Facility: CLINIC | Age: 54
End: 2024-11-29
Payer: COMMERCIAL

## 2024-11-29 ENCOUNTER — TELEPHONE (OUTPATIENT)
Dept: HEMATOLOGY ONCOLOGY | Facility: CLINIC | Age: 54
End: 2024-11-29

## 2024-11-29 ENCOUNTER — TELEPHONE (OUTPATIENT)
Age: 54
End: 2024-11-29

## 2024-11-29 DIAGNOSIS — Z86.711 HISTORY OF PULMONARY EMBOLISM: ICD-10-CM

## 2024-11-29 LAB
INR PPP: 3.57 (ref 0.85–1.19)
PROTHROMBIN TIME: 35.1 SECONDS (ref 12.3–15)

## 2024-11-29 PROCEDURE — 85610 PROTHROMBIN TIME: CPT

## 2024-11-29 PROCEDURE — 36415 COLL VENOUS BLD VENIPUNCTURE: CPT

## 2024-11-29 NOTE — TELEPHONE ENCOUNTER
Call received from patient. Stated she went to a [facility] lab to have her INR drawn but lab looked like it was closed so patient went home. She is unsure if it is open. Informed her that lab should be open and advised her to call then and see if they are. If they are closed she will call us back to see what she should do regarding needing to have her INR checked.

## 2024-11-30 ENCOUNTER — TELEPHONE (OUTPATIENT)
Dept: HEMATOLOGY ONCOLOGY | Facility: CLINIC | Age: 54
End: 2024-11-30

## 2024-11-30 NOTE — TELEPHONE ENCOUNTER
Spoke with Blanca, she did not get my message yesterday.  Informed her to hold the coumadin dose today and restart tomorrow, 12/1, at 5 mg daily.  INR check next Thursday.  Patient voiced understanding.

## 2024-12-05 ENCOUNTER — APPOINTMENT (OUTPATIENT)
Dept: LAB | Facility: CLINIC | Age: 54
End: 2024-12-05
Payer: COMMERCIAL

## 2024-12-05 DIAGNOSIS — Z86.711 HISTORY OF PULMONARY EMBOLISM: ICD-10-CM

## 2024-12-05 LAB
INR PPP: 1.11 (ref 0.85–1.19)
PROTHROMBIN TIME: 14.6 SECONDS (ref 12.3–15)

## 2024-12-05 PROCEDURE — 36415 COLL VENOUS BLD VENIPUNCTURE: CPT

## 2024-12-05 PROCEDURE — 85610 PROTHROMBIN TIME: CPT

## 2024-12-06 ENCOUNTER — TELEPHONE (OUTPATIENT)
Dept: HEMATOLOGY ONCOLOGY | Facility: CLINIC | Age: 54
End: 2024-12-06

## 2024-12-06 NOTE — TELEPHONE ENCOUNTER
Spoke with Blanca, her INR is 1.11.  She only held one dose as instructed.  She is currently taking coumadin 5 mg.  Recommend she take 7 mg daily.  Recheck next Thursday.  Patient voiced understanding.

## 2024-12-13 ENCOUNTER — APPOINTMENT (OUTPATIENT)
Dept: LAB | Facility: CLINIC | Age: 54
End: 2024-12-13
Payer: COMMERCIAL

## 2024-12-13 ENCOUNTER — TELEPHONE (OUTPATIENT)
Dept: HEMATOLOGY ONCOLOGY | Facility: CLINIC | Age: 54
End: 2024-12-13

## 2024-12-13 DIAGNOSIS — Z86.711 HISTORY OF PULMONARY EMBOLISM: ICD-10-CM

## 2024-12-13 LAB
INR PPP: 2.23 (ref 0.85–1.19)
PROTHROMBIN TIME: 24.7 SECONDS (ref 12.3–15)

## 2024-12-13 PROCEDURE — 36415 COLL VENOUS BLD VENIPUNCTURE: CPT

## 2024-12-13 PROCEDURE — 85610 PROTHROMBIN TIME: CPT

## 2024-12-13 NOTE — TELEPHONE ENCOUNTER
Spoke with Blanca about her INR of 2.23, which is therapeutic.  At the last phone call we had last week we had decided patient to take Coumadin 7 mg as her INR was subtherapeutic.  Patient tells me today she is only taking 5 mg.  At this time, recommend she only take 5 mg Monday through Friday and take 7 mg Saturday and Sunday.  Patient voiced understanding.

## 2024-12-17 RX ORDER — ROPINIROLE 1 MG/1
TABLET, FILM COATED ORAL DAILY
COMMUNITY

## 2024-12-17 RX ORDER — RABEPRAZOLE SODIUM 20 MG/1
TABLET, DELAYED RELEASE ORAL 2 TIMES DAILY
COMMUNITY
Start: 2024-08-08

## 2024-12-17 RX ORDER — PALIPERIDONE PALMITATE 819 MG/2.625ML
INJECTION, SUSPENSION, EXTENDED RELEASE INTRAMUSCULAR
COMMUNITY
Start: 2024-11-20

## 2024-12-18 ENCOUNTER — OFFICE VISIT (OUTPATIENT)
Dept: FAMILY MEDICINE CLINIC | Facility: CLINIC | Age: 54
End: 2024-12-18
Payer: COMMERCIAL

## 2024-12-18 VITALS
OXYGEN SATURATION: 98 % | HEIGHT: 66 IN | SYSTOLIC BLOOD PRESSURE: 132 MMHG | BODY MASS INDEX: 47.09 KG/M2 | HEART RATE: 82 BPM | WEIGHT: 293 LBS | DIASTOLIC BLOOD PRESSURE: 84 MMHG | TEMPERATURE: 96.6 F

## 2024-12-18 DIAGNOSIS — G43.709 CHRONIC MIGRAINE WITHOUT AURA WITHOUT STATUS MIGRAINOSUS, NOT INTRACTABLE: ICD-10-CM

## 2024-12-18 DIAGNOSIS — Z09 FOLLOW-UP EXAM: Primary | ICD-10-CM

## 2024-12-18 DIAGNOSIS — I10 BENIGN ESSENTIAL HYPERTENSION: Chronic | ICD-10-CM

## 2024-12-18 DIAGNOSIS — E03.9 ACQUIRED HYPOTHYROIDISM: Chronic | ICD-10-CM

## 2024-12-18 DIAGNOSIS — R73.03 PREDIABETES: ICD-10-CM

## 2024-12-18 DIAGNOSIS — E78.2 MIXED HYPERLIPIDEMIA: ICD-10-CM

## 2024-12-18 DIAGNOSIS — K22.70 BARRETT'S ESOPHAGUS DETERMINED BY BIOPSY: Chronic | ICD-10-CM

## 2024-12-18 PROCEDURE — 99214 OFFICE O/P EST MOD 30 MIN: CPT

## 2024-12-18 NOTE — ASSESSMENT & PLAN NOTE
01/24 Lipid panel  Cholesterol 207, Triglycerides 162, , HDL 48  07/24 Lipid panel  Cholesterol 286, Triglycerides 230, , HDL 71  Current Medications: Atorvastatin 10mg   Continue current medication regimen   Lifestyle modification: Incorporate regular exercise, avoid sugars and simple carbohydrates, weight loss and choosing healthier fats.

## 2024-12-18 NOTE — ASSESSMENT & PLAN NOTE
05/24 TSH 3.731  Repeat TSH ordered   Current medications: Levothyroxine 125mcg   Continue current medication regimen     Orders:    TSH, 3rd generation with Free T4 reflex; Future

## 2024-12-18 NOTE — PROGRESS NOTES
Name: Blanca Mason      : 1970      MRN: [id number]  Encounter Provider: JHONATAN Armando  Encounter Date: 2024   Encounter department: [facility]  :  Assessment & Plan  Follow-up exam         BMI 45.0-49.9, adult (HCC)  Wt Readings from Last 3 Encounters:   24 136 kg (299 lb 3.2 oz)   10/03/24 (!) 137 kg (303 lb)   24 (!) 137 kg (303 lb)            Chronic migraine without aura without status migrainosus, not intractable  Under the management of neurology   Receives Botox injections every 3 months        Benign essential hypertension  Today's /84   goal bp 140/90, pt's bp is at goal  Current medication: Losartan 100mg, Lasix 20mg, Clonidine 0.2mg BID   Continue current medication regimen   Advised patient on symptoms of hypotension & severe HTN  Limit salt-intake & caffeine. DASH diet discussed, minimize stress level  Weight reduction efforts via improved diet & increased exercise       Yan's esophagus determined by biopsy   EGD   Normal esophagus, biopsied  Small amount of retained gastric contents  Gastritis, biopsied   Current medications: Pantoprazole 40mg        Acquired hypothyroidism   TSH 3.731  Repeat TSH ordered   Current medications: Levothyroxine 125mcg   Continue current medication regimen     Orders:    TSH, 3rd generation with Free T4 reflex; Future    Mixed hyperlipidemia   Lipid panel  Cholesterol 207, Triglycerides 162, , HDL 48   Lipid panel  Cholesterol 286, Triglycerides 230, , HDL 71  Current Medications: Atorvastatin 10mg   Continue current medication regimen   Lifestyle modification: Incorporate regular exercise, avoid sugars and simple carbohydrates, weight loss and choosing healthier fats.       Prediabetes   Last A1C 5.7   Current A1C 5.4  Current medications: Metformin 750mg  Continue current medication regimen                    History of Present Illness     Follow up  "exam       Review of Systems   Constitutional:  Negative for activity change, chills, fatigue and fever.   HENT:  Negative for congestion, ear pain, rhinorrhea, sore throat and trouble swallowing.    Eyes:  Negative for pain and visual disturbance.   Respiratory:  Negative for cough, chest tightness and shortness of breath.    Cardiovascular:  Negative for chest pain, palpitations and leg swelling.   Gastrointestinal:  Negative for abdominal pain, constipation, diarrhea, nausea and vomiting.   Genitourinary:  Negative for difficulty urinating, dysuria, hematuria and urgency.   Musculoskeletal:  Negative for arthralgias and back pain.   Skin:  Negative for color change and rash.   Neurological:  Negative for dizziness, seizures, syncope and headaches.   Psychiatric/Behavioral:  Negative for dysphoric mood. The patient is not nervous/anxious.    All other systems reviewed and are negative.      Objective   /84 (BP Location: Left arm, Patient Position: Sitting, Cuff Size: Large)   Pulse 82   Temp (!) 96.6 °F (35.9 °C) (Temporal)   Ht 5' 6\" (1.676 m)   Wt 136 kg (299 lb 3.2 oz)   SpO2 98%   BMI 48.29 kg/m²      Physical Exam  Vitals and nursing note reviewed.   Constitutional:       General: She is not in acute distress.     Appearance: Normal appearance. She is well-developed and normal weight.   HENT:      Head: Normocephalic and atraumatic.      Right Ear: Tympanic membrane, ear canal and external ear normal. There is no impacted cerumen.      Left Ear: Tympanic membrane, ear canal and external ear normal. There is no impacted cerumen.      Nose: Nose normal.      Mouth/Throat:      Mouth: Mucous membranes are moist.      Pharynx: Oropharynx is clear.   Eyes:      Extraocular Movements: Extraocular movements intact.      Conjunctiva/sclera: Conjunctivae normal.      Pupils: Pupils are equal, round, and reactive to light.   Cardiovascular:      Rate and Rhythm: Normal rate and regular rhythm.      Pulses: " Normal pulses.      Heart sounds: Normal heart sounds. No murmur heard.  Pulmonary:      Effort: Pulmonary effort is normal. No respiratory distress.      Breath sounds: Normal breath sounds.   Abdominal:      General: Bowel sounds are normal.      Palpations: Abdomen is soft.      Tenderness: There is no abdominal tenderness.   Musculoskeletal:         General: Normal range of motion.      Cervical back: Normal range of motion and neck supple.      Right lower leg: No edema.      Left lower leg: No edema.   Skin:     General: Skin is warm and dry.      Capillary Refill: Capillary refill takes less than 2 seconds.   Neurological:      General: No focal deficit present.      Mental Status: She is alert and oriented to person, place, and time. Mental status is at baseline.   Psychiatric:         Mood and Affect: Mood normal.         Behavior: Behavior normal.         Thought Content: Thought content normal.         Judgment: Judgment normal.       Administrative Statements   I have spent a total time of 20 minutes in caring for this patient on the day of the visit/encounter including Diagnostic results, Prognosis, Risks and benefits of tx options, Instructions for management, Patient and family education, Importance of tx compliance, Risk factor reductions, Impressions, Counseling / Coordination of care, Documenting in the medical record, Reviewing / ordering tests, medicine, procedures  , and Obtaining or reviewing history  .

## 2024-12-18 NOTE — ASSESSMENT & PLAN NOTE
Today's /84   goal bp 140/90, pt's bp is at goal  Current medication: Losartan 100mg, Lasix 20mg, Clonidine 0.2mg BID   Continue current medication regimen   Advised patient on symptoms of hypotension & severe HTN  Limit salt-intake & caffeine. DASH diet discussed, minimize stress level  Weight reduction efforts via improved diet & increased exercise

## 2024-12-18 NOTE — ASSESSMENT & PLAN NOTE
11/24 EGD   Normal esophagus, biopsied  Small amount of retained gastric contents  Gastritis, biopsied   Current medications: Pantoprazole 40mg

## 2024-12-19 ENCOUNTER — APPOINTMENT (OUTPATIENT)
Dept: LAB | Facility: CLINIC | Age: 54
End: 2024-12-19
Payer: COMMERCIAL

## 2024-12-19 DIAGNOSIS — E03.9 ACQUIRED HYPOTHYROIDISM: Chronic | ICD-10-CM

## 2024-12-19 DIAGNOSIS — Z86.711 HISTORY OF PULMONARY EMBOLISM: ICD-10-CM

## 2024-12-19 LAB
INR PPP: 3.75 (ref 0.85–1.19)
PROTHROMBIN TIME: 36.5 SECONDS (ref 12.3–15)
TSH SERPL DL<=0.05 MIU/L-ACNC: 2.54 UIU/ML (ref 0.45–4.5)

## 2024-12-19 PROCEDURE — 36415 COLL VENOUS BLD VENIPUNCTURE: CPT

## 2024-12-19 PROCEDURE — 85610 PROTHROMBIN TIME: CPT

## 2024-12-19 PROCEDURE — 84443 ASSAY THYROID STIM HORMONE: CPT

## 2024-12-20 ENCOUNTER — TELEPHONE (OUTPATIENT)
Dept: HEMATOLOGY ONCOLOGY | Facility: CLINIC | Age: 54
End: 2024-12-20

## 2024-12-20 ENCOUNTER — TELEPHONE (OUTPATIENT)
Age: 54
End: 2024-12-20

## 2024-12-20 NOTE — TELEPHONE ENCOUNTER
Pt missed your call and is asking for a return phone call.      She will watch for your phone call.    Thank you

## 2024-12-20 NOTE — TELEPHONE ENCOUNTER
Spoke with patient and informed her that her INR is 3.75, I'd like her to take coumadin 2 mg today and Saturday, then go back to 5 mg.  Recheck INR next week (Thursday). Patient voiced understanding.

## 2024-12-24 ENCOUNTER — TELEPHONE (OUTPATIENT)
Dept: NEUROLOGY | Facility: CLINIC | Age: 54
End: 2024-12-24

## 2024-12-24 NOTE — TELEPHONE ENCOUNTER
Tried calling PT to confirm upcoming appointment on 12/30/24 at 8:30am with Dr. Brady at the [address] [place] [address] location. Left Pt VM with call back number.

## 2024-12-27 ENCOUNTER — APPOINTMENT (OUTPATIENT)
Dept: LAB | Facility: CLINIC | Age: 54
End: 2024-12-27
Payer: COMMERCIAL

## 2024-12-27 ENCOUNTER — TELEPHONE (OUTPATIENT)
Dept: HEMATOLOGY ONCOLOGY | Facility: CLINIC | Age: 54
End: 2024-12-27

## 2024-12-27 DIAGNOSIS — Z86.711 HISTORY OF PULMONARY EMBOLISM: ICD-10-CM

## 2024-12-27 LAB
INR PPP: 2.52 (ref 0.85–1.19)
PROTHROMBIN TIME: 27.1 SECONDS (ref 12.3–15)

## 2024-12-27 PROCEDURE — 85610 PROTHROMBIN TIME: CPT

## 2024-12-27 PROCEDURE — 36415 COLL VENOUS BLD VENIPUNCTURE: CPT

## 2024-12-27 NOTE — TELEPHONE ENCOUNTER
Spoke with Blanca, her INR is in the therapeutic range, 2.52.  recommend she take Coumadin 2 mg on Saturday and Sunday.  She will take Coumadin 5 mg Monday through Friday.  Patient voiced understanding.

## 2025-01-03 ENCOUNTER — TELEPHONE (OUTPATIENT)
Dept: HEMATOLOGY ONCOLOGY | Facility: CLINIC | Age: 55
End: 2025-01-03

## 2025-01-03 ENCOUNTER — APPOINTMENT (OUTPATIENT)
Dept: LAB | Facility: CLINIC | Age: 55
End: 2025-01-03
Payer: COMMERCIAL

## 2025-01-03 DIAGNOSIS — Z86.711 HISTORY OF PULMONARY EMBOLISM: ICD-10-CM

## 2025-01-03 LAB
INR PPP: 2.47 (ref 0.85–1.19)
PROTHROMBIN TIME: 26.7 SECONDS (ref 12.3–15)

## 2025-01-03 PROCEDURE — 85610 PROTHROMBIN TIME: CPT

## 2025-01-03 PROCEDURE — 36415 COLL VENOUS BLD VENIPUNCTURE: CPT

## 2025-01-03 NOTE — TELEPHONE ENCOUNTER
Woke with Blanca and informed her that her INR is therapeutic, 2.47.  To continue her regimen of Coumadin 5 mg Monday through Friday and Coumadin 2 mg Saturday and Sunday.  We will repeat the INR next week.  Patient voiced understanding.

## 2025-01-08 ENCOUNTER — TELEMEDICINE (OUTPATIENT)
Dept: NEUROLOGY | Facility: CLINIC | Age: 55
End: 2025-01-08
Payer: COMMERCIAL

## 2025-01-08 ENCOUNTER — TELEPHONE (OUTPATIENT)
Dept: NEUROLOGY | Facility: CLINIC | Age: 55
End: 2025-01-08

## 2025-01-08 DIAGNOSIS — G47.33 OSA (OBSTRUCTIVE SLEEP APNEA): Chronic | ICD-10-CM

## 2025-01-08 DIAGNOSIS — I10 BENIGN ESSENTIAL HYPERTENSION: Chronic | ICD-10-CM

## 2025-01-08 DIAGNOSIS — G43.709 CHRONIC MIGRAINE WITHOUT AURA WITHOUT STATUS MIGRAINOSUS, NOT INTRACTABLE: Primary | ICD-10-CM

## 2025-01-08 PROCEDURE — 99213 OFFICE O/P EST LOW 20 MIN: CPT | Performed by: PHYSICIAN ASSISTANT

## 2025-01-08 NOTE — TELEPHONE ENCOUNTER
Called patient to get her 1 Year f/u scheduled. Patient was unable to answer the phone I left a  with a call back number.
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
## 2025-01-10 ENCOUNTER — TELEPHONE (OUTPATIENT)
Dept: HEMATOLOGY ONCOLOGY | Facility: CLINIC | Age: 55
End: 2025-01-10

## 2025-01-10 ENCOUNTER — APPOINTMENT (OUTPATIENT)
Dept: LAB | Facility: CLINIC | Age: 55
End: 2025-01-10
Payer: COMMERCIAL

## 2025-01-10 DIAGNOSIS — Z86.711 HISTORY OF PULMONARY EMBOLISM: ICD-10-CM

## 2025-01-10 LAB
INR PPP: 2.3 (ref 0.85–1.19)
PROTHROMBIN TIME: 25.3 SECONDS (ref 12.3–15)

## 2025-01-10 PROCEDURE — 85610 PROTHROMBIN TIME: CPT

## 2025-01-10 PROCEDURE — 36415 COLL VENOUS BLD VENIPUNCTURE: CPT

## 2025-01-10 NOTE — TELEPHONE ENCOUNTER
Spoke with Blanca and informed her that her INR is therapeutic, 2.30. To continue her regimen of Coumadin 5 mg Monday through Friday and Coumadin 2 mg Saturday and Sunday. We will repeat the INR next week. Patient voiced understanding.

## 2025-01-14 NOTE — TREATMENT TEAM
10/02/23 0719   Team Meeting   Meeting Type Daily Rounds   Initial Conference Date 10/02/23   Team Members Present   Team Members Present Physician;Nurse;;; Occupational Therapist   Physician Team Member Dr. Chivo Eden Team Member Clarisse Ch, [address] Management Team Member [address] [facility] Work Team Member Claudia   OT Team Member Marco Macario   Patient/Family Present   Patient Present No   Patient's Family Present No     The patient presents on irritable edge at times with poor frustration tolerance. The patient is 30 days clean from methamphetamines. MD to optimize medications, will increase Depakote. The patient is future oriented and goal directed. Potential d/c by the end of the week 10/6/24 if the patient remains in good behavioral control. [Normal] : no posterior cervical lymphadenopathy and no anterior cervical lymphadenopathy

## 2025-01-15 DIAGNOSIS — M35.00 SJOGREN'S SYNDROME, WITH UNSPECIFIED ORGAN INVOLVEMENT (HCC): ICD-10-CM

## 2025-01-15 RX ORDER — PILOCARPINE HYDROCHLORIDE 5 MG/1
TABLET, FILM COATED ORAL
Qty: 270 TABLET | Refills: 4 | Status: SHIPPED | OUTPATIENT
Start: 2025-01-15

## 2025-01-17 ENCOUNTER — APPOINTMENT (OUTPATIENT)
Dept: LAB | Facility: CLINIC | Age: 55
End: 2025-01-17
Payer: COMMERCIAL

## 2025-01-17 ENCOUNTER — TELEPHONE (OUTPATIENT)
Dept: HEMATOLOGY ONCOLOGY | Facility: CLINIC | Age: 55
End: 2025-01-17

## 2025-01-17 DIAGNOSIS — Z86.711 HISTORY OF PULMONARY EMBOLISM: ICD-10-CM

## 2025-01-17 LAB
INR PPP: 2.41 (ref 0.85–1.19)
PROTHROMBIN TIME: 26.1 SECONDS (ref 12.3–15)

## 2025-01-17 PROCEDURE — 85610 PROTHROMBIN TIME: CPT

## 2025-01-17 PROCEDURE — 36415 COLL VENOUS BLD VENIPUNCTURE: CPT

## 2025-01-17 NOTE — TELEPHONE ENCOUNTER
Spoke with patient and informed her that her INR is therapeutic therefore continue taking Coumadin 5 mg Monday through Friday and Coumadin 2 mg Saturday and Sunday.  Repeat INR next week.  Patient voiced understanding.

## 2025-01-20 ENCOUNTER — DOCUMENTATION (OUTPATIENT)
Dept: CASE MANAGEMENT | Facility: HOSPITAL | Age: 55
End: 2025-01-20

## 2025-01-20 NOTE — BEHAVIORAL HEALTH HIGH UTILIZER
Patient Name:Blanca Mason MRN:  [id number]         : 1970     Age: 54 y.o.    Sex: female   Utilization History:  (# of ED visits & IP admits; reasons)    Pt had 9 [facility]-ED visits from 2024- 2024. ED presentations were related to SI with no plan or with a plan to overdose on medications. Pt intentionally overdosed on Flexeril twice and Trazodone twice. Pt reports periodic non-compliance with her medications.       Medical presentations were related to chest pain, right knee pain, withdrawing from meth, asthma exacerbation, fecal impaction, abdominal pain, toe or leg pain, dyspnea, SOB, bilateral leg edema, acute Saddle PE, acute respiratory failure, left shoulder pain, elevated INR, cellulitis of right lower extremity, right foot pain.    Treatment Recommendations & Presentation:  Presentation in the ED: Pt typically presents self to ED's. ED visits were related to SI with no plan or with a plan to overdose on medications. Pt intentionally overdosed on Flexeril twice and Trazodone twice. Pt reports periodic non-compliance with her medications.       Medical visits were related to chest pain, right knee pain, withdrawing from meth, asthma exacerbation, fecal impaction, abdominal pain, toe or leg pain, dyspnea, SOB, bilateral leg edema, acute Saddle PE, acute respiratory failure, elevated INR, cellulitis right lower extremity, right foot pain.       ED Recommendations: Following medical evaluation and treatment, establish acute risk versus pt's chronic behavioral disturbances. Please contact pt's [facility] ACT team to develop a safe discharge plan for pt to return home at [phone]. Pt should be contacting her ACT team when in Crisis and formulating an action plan to include distress tolerance coping skills.   Pt should continue her medical treatment with her multiple medical speciality providers.          Home Medication Regimen: see most recent documentation in Epic, you can also verify pt's  medications with her ACT team         Recent Medical Work Ups:  (include Psych testing or ECT)     Comprehensive labs - 2024  CT's -   Xrays -   VAS -   ECG - 2024 Inpatient Recommendations:   Collaborate with pt's community resources to develop a comprehensive discharge plan.           Right foot and toe surgery 2024  Outpatient recommendations:  Pt should continue her medical and mental health care with her community providers and take her medications as prescribed.         Situation/Relevant Background Info:    Pt is a 54 year old female with a diagnosis of Schizoaffective Disorder/ Bipolar type, Borderline Personality Disorder and a past history of meth abuse (clean since 2023 but uses medical marijuana) and numerous behavioral health hospitalizations.     Pt lives alone in an apartment that is close to where her father resides and has the [facility] ACT team. Pt's mother  a few years ago in  and pt reports this as a difficult issue for her.   Pt has experienced various medical issues in  and appears to be very preoccupied with her medical conditions, perceived medical issues and treatment and becomes frustrated if she feels she is not obtaining the medical care she needs in a timely manner. Pt has displayed demanding and agitated behaviors while on behavioral health units.  Pt was active in going to see her father in the community and helping him as well as working part-time approx 21 hours per week. It is unknown if pt is still working. Pt appears to have poor distress tolerance and impulsivity. Pt appears to want to control her discharge date from inpatient units.  Pt has multiple medical speciality providers in addition to her PCP office: [facility], [facility], [facility], [facility], [facility].  Pt had right foot bunionectomy and hammertoe surgery 2024.                        Diagnoses/Significant Problems (Medical & Psychiatric):    Schizoaffective disorder, bipolar type, Borderline Personality Disorder  Active Problems:    Benign essential hypertension    Edema    Hypothyroidism    MAG    Overactive bladder    Sjogren's syndrome (HCC)    COPD (chronic obstructive pulmonary disease) ([facility])    Medical clearance for psychiatric admission    History of pulmonary embolism    Ingrown toenail                        Drivers of repeated utilization:   preoccupation with medical concerns and issues, personality disordered behaviors, limited to poor coping skills, impulsivity, secondary gains from ED visits and hospitalizations.                                                   Existing Community Resources & Supports:                 father Luigi Mason - [phone]     Patient Medical & Psychiatric Care Team:  [facility] PCP - [phone]  [facility]  [facility]  [facility]  [facility]  [facility]  [facility]    Care plan date: 01/20/25                   Author:  Dennise Napier RN                 Date reviewed with patient:

## 2025-01-21 ENCOUNTER — HOSPITAL ENCOUNTER (EMERGENCY)
Facility: HOSPITAL | Age: 55
Discharge: HOME | End: 2025-01-21
Attending: EMERGENCY MEDICINE
Payer: COMMERCIAL

## 2025-01-21 ENCOUNTER — APPOINTMENT (EMERGENCY)
Dept: RADIOLOGY | Facility: HOSPITAL | Age: 55
End: 2025-01-21
Payer: COMMERCIAL

## 2025-01-21 VITALS
TEMPERATURE: 98.3 F | SYSTOLIC BLOOD PRESSURE: 138 MMHG | DIASTOLIC BLOOD PRESSURE: 62 MMHG | OXYGEN SATURATION: 93 % | RESPIRATION RATE: 18 BRPM | HEART RATE: 77 BPM

## 2025-01-21 DIAGNOSIS — R07.9 CHEST PAIN, UNSPECIFIED: Primary | ICD-10-CM

## 2025-01-21 LAB
2HR DELTA HS TROPONIN: 1 NG/L
ALBUMIN SERPL BCG-MCNC: 4 G/DL (ref 3.5–5)
ALP SERPL-CCNC: 85 U/L (ref 34–104)
ALT SERPL W P-5'-P-CCNC: 10 U/L (ref 7–52)
ANION GAP SERPL CALCULATED.3IONS-SCNC: 11 MMOL/L (ref 4–13)
AST SERPL W P-5'-P-CCNC: 9 U/L (ref 13–39)
ATRIAL RATE: 84 BPM
BASOPHILS # BLD AUTO: 0.03 THOUSANDS/ΜL (ref 0–0.1)
BASOPHILS NFR BLD AUTO: 0 % (ref 0–1)
BILIRUB SERPL-MCNC: 0.24 MG/DL (ref 0.2–1)
BNP SERPL-MCNC: 37 PG/ML (ref 0–100)
BUN SERPL-MCNC: 11 MG/DL (ref 5–25)
CALCIUM SERPL-MCNC: 9.3 MG/DL (ref 8.4–10.2)
CARDIAC TROPONIN I PNL SERPL HS: 4 NG/L (ref ?–50)
CARDIAC TROPONIN I PNL SERPL HS: 5 NG/L (ref ?–50)
CHLORIDE SERPL-SCNC: 104 MMOL/L (ref 96–108)
CO2 SERPL-SCNC: 25 MMOL/L (ref 21–32)
CREAT SERPL-MCNC: 0.66 MG/DL (ref 0.6–1.3)
EOSINOPHIL # BLD AUTO: 0.06 THOUSAND/ΜL (ref 0–0.61)
EOSINOPHIL NFR BLD AUTO: 1 % (ref 0–6)
ERYTHROCYTE [DISTWIDTH] IN BLOOD BY AUTOMATED COUNT: 14.6 % (ref 11.6–15.1)
GFR SERPL CREATININE-BSD FRML MDRD: 101 ML/MIN/1.73SQ M
GLUCOSE SERPL-MCNC: 95 MG/DL (ref 65–140)
HCT VFR BLD AUTO: 44.3 % (ref 34.8–46.1)
HGB BLD-MCNC: 13.8 G/DL (ref 11.5–15.4)
IMM GRANULOCYTES # BLD AUTO: 0.13 THOUSAND/UL (ref 0–0.2)
IMM GRANULOCYTES NFR BLD AUTO: 1 % (ref 0–2)
LYMPHOCYTES # BLD AUTO: 1.72 THOUSANDS/ΜL (ref 0.6–4.47)
LYMPHOCYTES NFR BLD AUTO: 18 % (ref 14–44)
MCH RBC QN AUTO: 28.5 PG (ref 26.8–34.3)
MCHC RBC AUTO-ENTMCNC: 31.2 G/DL (ref 31.4–37.4)
MCV RBC AUTO: 91 FL (ref 82–98)
MONOCYTES # BLD AUTO: 0.68 THOUSAND/ΜL (ref 0.17–1.22)
MONOCYTES NFR BLD AUTO: 7 % (ref 4–12)
NEUTROPHILS # BLD AUTO: 6.73 THOUSANDS/ΜL (ref 1.85–7.62)
NEUTS SEG NFR BLD AUTO: 73 % (ref 43–75)
NRBC BLD AUTO-RTO: 0 /100 WBCS
P AXIS: 74 DEGREES
PLATELET # BLD AUTO: 204 THOUSANDS/UL (ref 149–390)
PMV BLD AUTO: 10.4 FL (ref 8.9–12.7)
POTASSIUM SERPL-SCNC: 3.4 MMOL/L (ref 3.5–5.3)
PR INTERVAL: 212 MS
PROT SERPL-MCNC: 7.4 G/DL (ref 6.4–8.4)
QRS AXIS: -25 DEGREES
QRSD INTERVAL: 108 MS
QT INTERVAL: 376 MS
QTC INTERVAL: 444 MS
RBC # BLD AUTO: 4.85 MILLION/UL (ref 3.81–5.12)
SODIUM SERPL-SCNC: 140 MMOL/L (ref 135–147)
T WAVE AXIS: 79 DEGREES
VENTRICULAR RATE: 84 BPM
WBC # BLD AUTO: 9.35 THOUSAND/UL (ref 4.31–10.16)

## 2025-01-21 PROCEDURE — 99285 EMERGENCY DEPT VISIT HI MDM: CPT | Performed by: EMERGENCY MEDICINE

## 2025-01-21 PROCEDURE — 36415 COLL VENOUS BLD VENIPUNCTURE: CPT

## 2025-01-21 PROCEDURE — 84484 ASSAY OF TROPONIN QUANT: CPT

## 2025-01-21 PROCEDURE — 93005 ELECTROCARDIOGRAM TRACING: CPT

## 2025-01-21 PROCEDURE — 80053 COMPREHEN METABOLIC PANEL: CPT

## 2025-01-21 PROCEDURE — 85025 COMPLETE CBC W/AUTO DIFF WBC: CPT

## 2025-01-21 PROCEDURE — 71045 X-RAY EXAM CHEST 1 VIEW: CPT

## 2025-01-21 PROCEDURE — 93010 ELECTROCARDIOGRAM REPORT: CPT | Performed by: STUDENT IN AN ORGANIZED HEALTH CARE EDUCATION/TRAINING PROGRAM

## 2025-01-21 PROCEDURE — 83880 ASSAY OF NATRIURETIC PEPTIDE: CPT

## 2025-01-21 PROCEDURE — 99285 EMERGENCY DEPT VISIT HI MDM: CPT

## 2025-01-21 RX ORDER — POTASSIUM CHLORIDE 1500 MG/1
40 TABLET, EXTENDED RELEASE ORAL ONCE
Status: COMPLETED | OUTPATIENT
Start: 2025-01-21 | End: 2025-01-21

## 2025-01-21 RX ADMIN — POTASSIUM CHLORIDE 40 MEQ: 1500 TABLET, EXTENDED RELEASE ORAL at 10:09

## 2025-01-21 NOTE — Clinical Note
Blanca Mason was seen and treated in our emergency department on 1/21/2025.    No restrictions            Diagnosis:     Blanca  may return to work on return date.    She may return on this date: 01/23/2025         If you have any questions or concerns, please don't hesitate to call.      Neo Johnson MD    ______________________________           _______________          _______________  Hospital Representative                              Date                                Time

## 2025-01-21 NOTE — ED ATTENDING ATTESTATION
1/21/2025  I, Jhonathan Alfaro MD, saw and evaluated the patient. I have discussed the patient with the resident/non-physician practitioner and agree with the resident's/non-physician practitioner's findings, Plan of Care, and MDM as documented in the resident's/non-physician practitioner's note, except where noted. All available labs and Radiology studies were reviewed.  I was present for key portions of any procedure(s) performed by the resident/non-physician practitioner and I was immediately available to provide assistance.       At this point I agree with the current assessment done in the Emergency Department.  I have conducted an independent evaluation of this patient a history and physical is as follows:    This is a 54-year-old female with a relevant past medical history of VTE, on Coumadin, most recently therapeutic on her INR 4 days ago, presenting to the ED today for a complaint of chest pain.  Her chest pain is left-sided, radiating to the left arm, with associated shortness of breath.  Upon arrival to the ED her symptoms are completely resolved.  Patient has history of hypertension hyperlipidemia as well as Yan's esophagus, as well as Sjogren's syndrome.  Patient has not had any cough, congestion, infectious symptoms.  Her exam for the most part is unremarkable.  Her differential diagnosis includes: Atypical ACS versus less likely VTE versus bronchospasm versus other.  She does not have any significant abnormalities on her CBC, metabolic panel aside from some slight hypokalemia.  Her troponin x 2 is unremarkable, EKG also does not show any significant acute abnormalities.  Her chest x-ray was read by radiology as having haziness in the right lower lobe, concerning for pneumonia versus atelectasis.  I think this most likely fits with atelectasis, considering patient does not have any cough congestion, fever, chills, or anything to suggest infection.  Patient will follow-up with cardiology as an  "outpatient.  The management plan was discussed in detail with the patient at bedside and all questions were answered. Strict ED return instructions were discussed at bedside. Prior to discharge, both verbal and written instructions were provided. We discussed the signs and symptoms that should prompt the patient to return to the ED. All questions were answered and the patient was comfortable with the plan of care and discharged home. The patient agrees to return to the Emergency Department for concerns and/or progression of illness.    Portions of the above record have been created with voice recognition software.  Occasional wrong word or \"sound alike\" substitutions may have occurred due to the inherent limitations of voice recognition software.  Read the chart carefully and recognize, using context, where substitutions may have occurred.    ED Course         Critical Care Time  Procedures      "

## 2025-01-21 NOTE — ED PROVIDER NOTES
Time reflects when diagnosis was documented in both MDM as applicable and the Disposition within this note       Time User Action Codes Description Comment    1/21/2025 11:49 AM Jhonathan Alfaro Add [R07.9] Chest pain, unspecified           ED Disposition       ED Disposition   Discharge    Condition   Stable    Date/Time   Tue Jan 21, 2025 11:49 AM    Comment   Blanca Mason discharge to home/self care.                   Assessment & Plan       Medical Decision Making  Female patient who presented to the emergency department with chest pain.  On physical examination, she was noted to have an unremarkable examination.  Patient's labs showed an initial troponin level of 4 as well as a repeat of 5.  Patient also had a mild hypokalemia which was repleted while in the emergency department.  Patient's one-view portable chest x-ray showed concerns for right basilar pneumonia and/or atelectasis as per radiology.  Given patient has no clinical signs of pneumonia at this time including cough or fevers, plan is to hold off on antibiotics at this time.  While in the department, patient was given potassium chloride.  She was plan for discharge and advised follow-up outpatient with PCP as needed.  She was also instructed return the emergency department if her symptoms worsen.  Patient was agreeable to plan.    Amount and/or Complexity of Data Reviewed  Labs: ordered. Decision-making details documented in ED Course.  Radiology: ordered and independent interpretation performed. Decision-making details documented in ED Course.    Risk  Prescription drug management.        ED Course as of 01/24/25 0044   Tue Jan 21, 2025   0956 Potassium(!): 3.4  Will replete   1010 hs TnI 0hr: 4   1028 XR chest 1 view portable  Right basilar pneumonia and/or atelectasis.   1202 hs TnI 2hr: 5.0   1202 Delta 2hr hsTnI: 1   1215 BNP: 37       Medications   potassium chloride (Klor-Con M20) CR tablet 40 mEq (40 mEq Oral Given 1/21/25 1009)       ED Risk  Strat Scores   HEART Risk Score      Flowsheet Row Most Recent Value   Heart Score Risk Calculator    History 1 Filed at: 01/21/2025 1011   ECG 0 Filed at: 01/21/2025 1011   Age 1 Filed at: 01/21/2025 1011   Risk Factors 1 Filed at: 01/21/2025 1011   Troponin 0 Filed at: 01/21/2025 1011   HEART Score 3 Filed at: 01/21/2025 1011          HEART Risk Score      Flowsheet Row Most Recent Value   Heart Score Risk Calculator    History 1 Filed at: 01/21/2025 1011   ECG 0 Filed at: 01/21/2025 1011   Age 1 Filed at: 01/21/2025 1011   Risk Factors 1 Filed at: 01/21/2025 1011   Troponin 0 Filed at: 01/21/2025 1011   HEART Score 3 Filed at: 01/21/2025 1011                            SBIRT 22yo+      Flowsheet Row Most Recent Value   Initial Alcohol Screen: US AUDIT-C     1. How often do you have a drink containing alcohol? 0 Filed at: 01/21/2025 0917   2. How many drinks containing alcohol do you have on a typical day you are drinking?  0 Filed at: 01/21/2025 0917   3b. FEMALE Any Age, or MALE 65+: How often do you have 4 or more drinks on one occassion? 0 Filed at: 01/21/2025 0917   Audit-C Score 0 Filed at: 01/21/2025 0917   OLEG: How many times in the past year have you...    Used an illegal drug or used a prescription medication for non-medical reasons? Never Filed at: 01/21/2025 0917                            History of Present Illness       Chief Complaint   Patient presents with    Chest Pain     Pt reports onset of sharp chest pain onset 0800, then got lightheaded and now has tightness with breathing, also reports left arm pain radiating       Past Medical History:   Diagnosis Date    Acute deep vein thrombosis of lower leg, left (HCC) 10/16/2023    Acute heart failure, unspecified heart failure type (HCC) 05/06/2024    Anxiety     Arthritis     oa cassandra knees    Asthma     good control- no medications    Yan's esophagus     Bipolar affective disorder (HCC)     Cannabis abuse with unspecified cannabis-induced  "disorder (HCC)     Chronic pain disorder     lumbar    Contusion of elbow 12/21/2021    COPD (chronic obstructive pulmonary disease) (HCC)     CPAP (continuous positive airway pressure) dependence     DDD (degenerative disc disease), lumbar 04/09/2015    Degenerative disc disease at L5-S1 level     Deliberate self-cutting     9/15/22per pt has not done recently-- does see a therapist and psychiatrist regularly    Depression 09/16/2008    Diarrhea 01/06/2022    Disease of thyroid gland     hypo    MARTINEZ (dyspnea on exertion)     per pt \"has had this with exertion and not new\"    Drug overdose 10/28/2008    suicide attempt and again drug overdose 7/2022-- [facility] floor and than transferred to [facility]    Dysphagia     Dyspnea     Ecchymosis 01/06/2022    Edema     BLE    Elevated troponin 09/02/2023    Family history of blood clots     GERD (gastroesophageal reflux disease)     Grief 11/11/2023    Headache(784.0)     Headache, tension-type     Hemorrhage of rectum and anus 10/02/2017    Formatting of this note might be different from the original.  Added automatically from request for surgery [id number]    High blood pressure     History of COVID-19 12/30/2020    Symptoms started on 12/30/2020 and then got worse.  Today she is feeling a little bit better.  She is a candidate for monoclonal antibody infusion and set for 1/6/21 @ 1pm at [facility]. 01/07/21 - telemedicine -     Hyperlipidemia     Hypertension     Ingrown toenail 12/02/2023    Intentional overdose (HCC) 09/27/2023    Intentional self-harm by unspecified sharp object, sequela (HCC) 02/09/2023    Knee pain, bilateral     Especially right    Knee pain, right 02/23/2022    Medial epicondylitis of elbow, left 04/03/2018    Formatting of this note might be different from the original.  Added automatically from request for surgery [id number]    Medial epicondylitis of right elbow 07/17/2023    Medical marijuana use     Memory difficulties 08/06/2020    Memory " loss     Migraines     Obesity     Other psychoactive substance abuse with unspecified psychoactive substance-induced disorder (HCC) 05/06/2024    Overactive bladder     Polysubstance abuse (HCC) 09/29/2023    Potential for cognitive impairment 06/17/2021    Primary osteoarthritis of both knees 08/08/2018    Pulmonary emboli (HCC)     Restrictive lung disease 02/01/2017    Last Assessment & Plan:   Formatting of this note might be different from the original.  I reviewed this problem throughout the rest of the note. Please refer to the other assessments for details.    Sjogren's disease (HCC)     Sleep apnea     Stress incontinence     Suicidal deliberate poisoning (HCC) 07/13/2022    Suicidal ideations     Teeth missing     Toxic encephalopathy 11/08/2023    Tremor     per pt Tremors of Right Leg and both Arms    Visual impairment     Wears glasses       Past Surgical History:   Procedure Laterality Date    BREAST CYST EXCISION Right 1989    CARPAL TUNNEL RELEASE Left     CHOLECYSTECTOMY  05/2003    Laparoscopic    COLONOSCOPY      01/12/2009    DILATION AND CURETTAGE OF UTERUS      ELBOW SURGERY Left     x2. No hardware    ESOPHAGOGASTRODUODENOSCOPY      FOOT SURGERY Left     Plantar fasciotomy    HERNIA REPAIR      HYSTERECTOMY      laporoscopic, partial    KNEE ARTHROSCOPY Bilateral     OOPHORECTOMY Left 10/2015    [facility] 8/2/2024    Procedure: RIGHT FOOT LAPIDUS BUNIONECTOMY, 2ND HAMMERTOE REPAIR, SECOND METATARSAL OSTEOTOMY WITH SECOND PLANTAR PLATE REPAIR;  Surgeon: Kaz Bautista DPM;  Location:  MAIN OR;  Service: Podiatry    CT GASTROCNEMIUS RECESSION Left 02/24/2021    Procedure: RECESSION GASTROC OPEN;  Surgeon: Nomi Yang DPM;  Location:  MAIN OR;  Service: Podiatry    CT RPR UMBILICAL HRNA 5 YRS/> REDUCIBLE N/A 04/24/2019    Procedure: REPAIR HERNIA UMBILICAL LAPAROSCOPIC W/ ROBOTICS;  Surgeon: Anahi Colon MD;  Location: [facility];  Service: General    TX SPHINCTEROTOMY ANAL DIVISION SPHINCTER SPX N/A 2018    Procedure: EUA, LEFT PARTIAL INTERNAL SPHINCTEROTOMY;  Surgeon: Tien Reyes MD;  Location:  MAIN OR;  Service: Colorectal    TX TNOT ELBOW LATERAL/MEDIAL DEBRIDE OPEN TDN RPR Left 2022    Procedure: RELEASE EPICONDYLAR ELBOW MEDIAL;  Surgeon: Kyree Winkler MD;  Location: [facility] MA[facility] OR;  Service: Orthopedics    REDUCTION MAMMAPLASTY Bilateral 1999    REPAIR LACERATION Left     left hand-2009    REPLACEMENT TOTAL KNEE Right     ROTATOR CUFF REPAIR Left     TONSILECTOMY AND ADNOIDECTOMY      US GUIDED MSK PROCEDURE  2021    VASCULAR SURGERY      VEIN LIGATION Bilateral     WISDOM TOOTH EXTRACTION        Family History   Problem Relation Age of Onset    Kidney cancer Mother     Diabetes Mother     Depression Mother     Stroke Mother     Arthritis Mother     Cancer Mother     Psychiatric Illness Mother     No Known Problems Father     No Known Problems Sister     No Known Problems Maternal Grandmother     No Known Problems Maternal Grandfather     No Known Problems Paternal Grandmother     No Known Problems Paternal Grandfather     Colon cancer Maternal Uncle     Colon cancer Maternal Uncle     Colon cancer Paternal Uncle     Colon cancer Family     Alcohol abuse Sister     Asthma Sister       Social History     Tobacco Use    Smoking status: Former     Current packs/day: 0.00     Average packs/day: 2.0 packs/day for 33.0 years (66.0 ttl pk-yrs)     Types: Cigarettes     Start date: 1985     Quit date: 2018     Years since quittin.0     Passive exposure: Current    Smokeless tobacco: Never    Tobacco comments:     Started at age 16.   Vaping Use    Vaping status: Some Days    Substances: THC   Substance Use Topics    Alcohol use: Not Currently     Comment: Recovering alcoholic    Drug use: Yes     Types: Marijuana     Comment: Former meth use, medicinal marijuana      E-Cigarette/Vaping     E-Cigarette Use Current Some Day User     Comments medical THC       E-Cigarette/Vaping Substances    Nicotine No     THC Yes     CBD No     Flavoring No     Other No     Unknown No       I have reviewed and agree with the history as documented.     54-year-old female with extensive PMH who presents to the emergency department with chest pain.  Patient states while at work this morning she was sitting down and beginning to eat when she started to feel a sharp left-sided chest pain.  Patient states that the pain radiated to her left arm and was made worse with deep breaths.  She states that her pain is improved when she rests.  She states that she has had similar chest pain like this in the past however never with radiation.  She denies any diaphoresis, lightheadedness, or nausea.  She states that she is on Coumadin due to having a pulmonary embolism 2 years ago.  She denies any medications prior to arrival in the emergency department.  Patient's chest pain at bedside seems to have improved since initial onset.  No other acute concerns at this time. Denies SOB, cough, abdominal pain, n/v/d, fever, chills, dizziness, lightheadedness, HA, dysuria, hematuria, hematochezia, or melena.             Chest Pain  Associated symptoms: no abdominal pain, no back pain, no cough, no diaphoresis, no dizziness, no dysphagia, no fatigue, no fever, no headache, no nausea, no palpitations, no shortness of breath, not vomiting and no weakness        Review of Systems   Constitutional:  Negative for appetite change, chills, diaphoresis, fatigue and fever.   HENT:  Negative for congestion, ear pain, postnasal drip, rhinorrhea, sore throat and trouble swallowing.    Eyes:  Negative for pain and visual disturbance.   Respiratory:  Negative for cough and shortness of breath.    Cardiovascular:  Positive for chest pain. Negative for palpitations.   Gastrointestinal:  Negative for abdominal pain, constipation, diarrhea, nausea and vomiting.    Genitourinary:  Negative for decreased urine volume, dysuria and hematuria.   Musculoskeletal:  Negative for arthralgias and back pain.   Skin:  Negative for color change and rash.   Neurological:  Negative for dizziness, seizures, syncope, weakness, light-headedness and headaches.   All other systems reviewed and are negative.          Objective       ED Triage Vitals   Temperature Pulse Blood Pressure Respirations SpO2 Patient Position - Orthostatic VS   01/21/25 0923 01/21/25 0917 01/21/25 0917 01/21/25 0917 01/21/25 0917 01/21/25 1100   98.3 °F (36.8 °C) 100 (!) 216/99 20 97 % Sitting      Temp Source Heart Rate Source BP Location FiO2 (%) Pain Score    01/21/25 0923 01/21/25 0917 01/21/25 1100 -- 01/21/25 0916    Oral Monitor Left arm  6      Vitals      Date and Time Temp Pulse SpO2 Resp BP Pain Score FACES Pain Rating User   01/21/25 1100 -- 77 93 % 18 138/62 -- -- LR   01/21/25 1000 -- 80 96 % -- 158/70 -- -- MM   01/21/25 0923 98.3 °F (36.8 °C) -- -- -- -- -- -- AB   01/21/25 0917 -- 100 97 % 20 216/99 -- -- AB   01/21/25 0916 -- -- -- -- -- 6 -- AB            Physical Exam  Vitals and nursing note reviewed.   Constitutional:       General: She is not in acute distress.     Appearance: Normal appearance. She is normal weight.   HENT:      Head: Normocephalic and atraumatic.      Right Ear: External ear normal.      Left Ear: External ear normal.      Nose: Nose normal.      Mouth/Throat:      Pharynx: Oropharynx is clear.   Eyes:      Conjunctiva/sclera: Conjunctivae normal.   Cardiovascular:      Rate and Rhythm: Normal rate and regular rhythm.      Pulses: Normal pulses.      Heart sounds: Normal heart sounds.      Comments: RRR with +S1 and S2, no murmurs appreciated on exam. Peripheral pulses intact.    Pulmonary:      Effort: Pulmonary effort is normal. No respiratory distress.      Breath sounds: No wheezing, rhonchi or rales.      Comments: CTABL with no abnormal lung sounds such as wheezes or  rales appreciated on exam.     Abdominal:      General: Abdomen is flat. Bowel sounds are normal. There is no distension.      Palpations: Abdomen is soft.      Tenderness: There is no abdominal tenderness.      Comments: Soft, non tender, normo-active bowel sounds. Without rigidity, guarding, or distension.     Musculoskeletal:         General: Normal range of motion.      Cervical back: Normal range of motion.      Right lower leg: Edema present.      Left lower leg: Edema present.      Comments: Bilateral lower extremity edema.   Skin:     General: Skin is warm and dry.   Neurological:      General: No focal deficit present.      Mental Status: She is alert and oriented to person, place, and time. Mental status is at baseline.      Comments: CN grossly intact on visualization. No focal neurologic deficits noted on exam.  5/5 strength in all extremities. Neurovascularly intact with normal sensation and motor function.             Results Reviewed       Procedure Component Value Units Date/Time    B-Type Natriuretic Peptide(BNP) [[id number]]  (Normal) Collected: 01/21/25 0922    Lab Status: Final result Specimen: Blood from Arm, Right Updated: 01/21/25 1206     BNP 37 pg/mL     HS Troponin I 2hr [[id number]]  (Normal) Collected: 01/21/25 1106    Lab Status: Final result Specimen: Blood from Arm, Right Updated: 01/21/25 1148     hs TnI 2hr 5.0 ng/L      Delta 2hr hsTnI 1 ng/L     HS Troponin 0hr (reflex protocol) [[id number]]  (Normal) Collected: 01/21/25 0922    Lab Status: Final result Specimen: Blood from Arm, Right Updated: 01/21/25 1002     hs TnI 0hr 4 ng/L     Comprehensive metabolic panel [[id number]]  (Abnormal) Collected: 01/21/25 0922    Lab Status: Final result Specimen: Blood from Arm, Right Updated: 01/21/25 0955     Sodium 140 mmol/L      Potassium 3.4 mmol/L      Chloride 104 mmol/L      CO2 25 mmol/L      ANION GAP 11 mmol/L      BUN 11 mg/dL      Creatinine 0.66 mg/dL      Glucose 95 mg/dL      Calcium  9.3 mg/dL      AST 9 U/L      ALT 10 U/L      Alkaline Phosphatase 85 U/L      Total Protein 7.4 g/dL      Albumin 4.0 g/dL      Total Bilirubin 0.24 mg/dL      eGFR 101 ml/min/1.73sq m     Narrative:      National Kidney Disease Foundation guidelines for Chronic Kidney Disease (CKD):     Stage 1 with normal or high GFR (GFR > 90 mL/min/1.73 square meters)    Stage 2 Mild CKD (GFR = 60-89 mL/min/1.73 square meters)    Stage 3A Moderate CKD (GFR = 45-59 mL/min/1.73 square meters)    Stage 3B Moderate CKD (GFR = 30-44 mL/min/1.73 square meters)    Stage 4 Severe CKD (GFR = 15-29 mL/min/1.73 square meters)    Stage 5 End Stage CKD (GFR <15 mL/min/1.73 square meters)  Note: GFR calculation is accurate only with a steady state creatinine    CBC and differential [[id number]]  (Abnormal) Collected: 01/21/25 0922    Lab Status: Final result Specimen: Blood from Arm, Right Updated: 01/21/25 0939     WBC 9.35 Thousand/uL      RBC 4.85 Million/uL      Hemoglobin 13.8 g/dL      Hematocrit 44.3 %      MCV 91 fL      MCH 28.5 pg      MCHC 31.2 g/dL      RDW 14.6 %      MPV 10.4 fL      Platelets 204 Thousands/uL      nRBC 0 /100 WBCs      Segmented % 73 %      Immature Grans % 1 %      Lymphocytes % 18 %      Monocytes % 7 %      Eosinophils Relative 1 %      Basophils Relative 0 %      Absolute Neutrophils 6.73 Thousands/µL      Absolute Immature Grans 0.13 Thousand/uL      Absolute Lymphocytes 1.72 Thousands/µL      Absolute Monocytes 0.68 Thousand/µL      Eosinophils Absolute 0.06 Thousand/µL      Basophils Absolute 0.03 Thousands/µL             XR chest 1 view portable   ED Interpretation by Neo Johnson MD (01/21 0956)   Image was independently interpreted by myself and showed no acute concerns of cardiopulmonary disease at this time as compared to May 2024.        Final Interpretation by Tien Nash MD (01/21 1017)      Right basilar pneumonia and/or atelectasis.            Resident: Olu Padilla I, the attending  radiologist, have reviewed the images and agree with the final report above.      Workstation performed: [id number]             ECG 12 Lead Documentation Only    Date/Time: 1/21/2025 9:25 AM    Performed by: Neo Johnson MD  Authorized by: Neo Johnson MD    ECG reviewed by me, the ED Provider: yes    Patient location:  ED  Previous ECG:     Previous ECG:  Compared to current    Similarity:  No change  Interpretation:     Interpretation: normal    Rate:     ECG rate:  84    ECG rate assessment: normal    Rhythm:     Rhythm: sinus rhythm and A-V block    Ectopy:     Ectopy: none    QRS:     QRS axis:  Left    QRS intervals:  Normal  Conduction:     Conduction: normal    ST segments:     ST segments:  Normal  T waves:     T waves: normal        ED Medication and Procedure Management   Prior to Admission Medications   Prescriptions Last Dose Informant Patient Reported? Taking?   Aciphex 20 MG tablet   Yes No   Sig: Take by mouth 2 (two) times a day   Austedo XR 24 MG TB24   Yes No   Cholecalciferol (Vitamin D3) 125 MCG (5000 UT) capsule   No No   Sig: Take 1 capsule (5,000 Units total) by mouth daily   Invega Trinza 819 MG/2.63ML DWIGHT   Yes No   Patient not taking: Reported on 1/8/2025   OXcarbazepine (TRILEPTAL) 150 mg tablet  Self No No   Sig: Take 3 tablets (450 mg total) by mouth every 12 (twelve) hours   OXcarbazepine (TRILEPTAL) 300 mg tablet  Self Yes No   Sig: Take 300 mg by mouth every 12 (twelve) hours   atoMOXetine (STRATTERA) 60 mg capsule   Yes No   Sig: Take 60 mg by mouth daily   atorvastatin (LIPITOR) 10 mg tablet   No No   Sig: Take 1 tablet (10 mg total) by mouth daily   benztropine (COGENTIN) 1 mg tablet   Yes No   buPROPion (WELLBUTRIN XL) 150 mg 24 hr tablet   Yes No   cloNIDine (CATAPRES) 0.2 mg tablet   No No   Sig: TAKE 1 TABLET BY MOUTH EVERY 12 HOURS   Patient not taking: Reported on 1/8/2025   cyanocobalamin (VITAMIN B-12) 1000 MCG tablet   No No   Sig: Take 1 tablet (1,000 mcg total) by  mouth daily   enoxaparin (LOVENOX) 150 mg/mL injection   No No   Sig: Inject 0.9 mL (135 mg total) under the skin every 12 (twelve) hours for 10 days Start with your coumadin post surgery as discussed   Patient not taking: Reported on 1/8/2025   furosemide (LASIX) 20 mg tablet   No No   Sig: Take 1 tablet (20 mg total) by mouth daily   levocetirizine (XYZAL) 5 MG tablet   No No   Sig: TAKE ONE TABLET BY MOUTH IN THE EVENING DAILY   levothyroxine (Euthyrox) 125 mcg tablet   No No   Sig: Take 1 tablet (125 mcg total) by mouth daily in the early morning   losartan (COZAAR) 100 MG tablet   No No   Sig: Take 1 tablet (100 mg total) by mouth daily   lurasidone (LATUDA) 40 mg tablet  Self Yes No   metFORMIN (GLUCOPHAGE-XR) 750 mg 24 hr tablet   No No   Sig: Take 1 tablet (750 mg total) by mouth daily with breakfast   naltrexone (REVIA) 50 mg tablet   No No   Sig: TAKE 1 TABLET BY MOUTH DAILY   Patient not taking: Reported on 1/8/2025   naproxen (Naprosyn) 500 mg tablet   No No   Sig: Take 1 tablet (500 mg total) by mouth 2 (two) times a day with meals Take with food.   nitrofurantoin (MACROBID) 100 mg capsule   Yes No   Patient not taking: Reported on 1/8/2025   oxybutynin (DITROPAN) 5 mg tablet   No No   Sig: Take 1 tablet (5 mg total) by mouth 2 (two) times a day   pantoprazole (PROTONIX) 40 mg tablet  Self No No   Sig: Take 1 tablet (40 mg total) by mouth daily in the early morning   pilocarpine (SALAGEN) 5 mg tablet   No No   Sig: TAKE 1 TABLET (5 MG TOTAL) BY MOUTH THREE (THREE) TIMES A DAY   rOPINIRole (REQUIP) 1 mg tablet   Yes No   Sig: Take by mouth daily   topiramate (TOPAMAX) 200 MG tablet  Self No No   Sig: Take 1 tablet (200 mg total) by mouth 2 (two) times a day   traZODone (DESYREL) 100 mg tablet   Yes No   trospium chloride (SANCTURA) 20 mg tablet   Yes No   Sig: Take 20 mg by mouth 2 (two) times a day   warfarin (COUMADIN) 2 mg tablet   No No   Sig: take 3 & 1/2 tablets (7MG) once a day Saturday and Sunday  and take 2 & 1/2 tablets (5MG) once a day Monday through Friday   warfarin (COUMADIN) 5 mg tablet   No No   Sig: Take 1 tablet (5 mg total) by mouth daily      Facility-Administered Medications: None     Discharge Medication List as of 1/21/2025 12:03 PM        CONTINUE these medications which have NOT CHANGED    Details   Aciphex 20 MG tablet Take by mouth 2 (two) times a day, Starting Thu 8/8/2024, Historical Med      atoMOXetine (STRATTERA) 60 mg capsule Take 60 mg by mouth daily, Starting Wed 3/6/2024, Historical Med      atorvastatin (LIPITOR) 10 mg tablet Take 1 tablet (10 mg total) by mouth daily, Starting Wed 9/18/2024, Normal      Austedo XR 24 MG TB24 Historical Med      benztropine (COGENTIN) 1 mg tablet Historical Med      buPROPion (WELLBUTRIN XL) 150 mg 24 hr tablet Historical Med      Cholecalciferol (Vitamin D3) 125 MCG (5000 UT) capsule Take 1 capsule (5,000 Units total) by mouth daily, Starting Thu 4/4/2024, Normal      cloNIDine (CATAPRES) 0.2 mg tablet TAKE 1 TABLET BY MOUTH EVERY 12 HOURS, Starting Tue 10/29/2024, Normal      cyanocobalamin (VITAMIN B-12) 1000 MCG tablet Take 1 tablet (1,000 mcg total) by mouth daily, Starting Thu 4/4/2024, Until Wed 1/8/2025, Normal      enoxaparin (LOVENOX) 150 mg/mL injection Inject 0.9 mL (135 mg total) under the skin every 12 (twelve) hours for 10 days Start with your coumadin post surgery as discussed, Starting Thu 8/8/2024, Until Sun 8/18/2024, Normal      furosemide (LASIX) 20 mg tablet Take 1 tablet (20 mg total) by mouth daily, Starting Wed 9/18/2024, Normal      Invega Trinza 819 MG/2.63ML DWIGHT Historical Med      levocetirizine (XYZAL) 5 MG tablet TAKE ONE TABLET BY MOUTH IN THE EVENING DAILY, Starting Wed 8/21/2024, Normal      levothyroxine (Euthyrox) 125 mcg tablet Take 1 tablet (125 mcg total) by mouth daily in the early morning, Starting Wed 9/18/2024, Normal      losartan (COZAAR) 100 MG tablet Take 1 tablet (100 mg total) by mouth daily,  Starting Wed 9/18/2024, Normal      lurasidone (LATUDA) 40 mg tablet Historical Med      metFORMIN (GLUCOPHAGE-XR) 750 mg 24 hr tablet Take 1 tablet (750 mg total) by mouth daily with breakfast, Starting Wed 9/18/2024, Normal      naltrexone (REVIA) 50 mg tablet TAKE 1 TABLET BY MOUTH DAILY, Starting Tue 8/6/2024, Normal      naproxen (Naprosyn) 500 mg tablet Take 1 tablet (500 mg total) by mouth 2 (two) times a day with meals Take with food., Starting Thu 10/3/2024, Normal      nitrofurantoin (MACROBID) 100 mg capsule Historical Med      OXcarbazepine (TRILEPTAL) 300 mg tablet Take 300 mg by mouth every 12 (twelve) hours, Starting Fri 2/2/2024, Historical Med      oxybutynin (DITROPAN) 5 mg tablet Take 1 tablet (5 mg total) by mouth 2 (two) times a day, Starting Thu 4/4/2024, Until Wed 1/8/2025, Normal      pantoprazole (PROTONIX) 40 mg tablet Take 1 tablet (40 mg total) by mouth daily in the early morning, Starting Thu 4/4/2024, Normal      pilocarpine (SALAGEN) 5 mg tablet TAKE 1 TABLET (5 MG TOTAL) BY MOUTH THREE (THREE) TIMES A DAY, Normal      rOPINIRole (REQUIP) 1 mg tablet Take by mouth daily, Historical Med      topiramate (TOPAMAX) 200 MG tablet Take 1 tablet (200 mg total) by mouth 2 (two) times a day, Starting Tue 1/30/2024, Until Wed 1/8/2025, Normal      traZODone (DESYREL) 100 mg tablet Historical Med      trospium chloride (SANCTURA) 20 mg tablet Take 20 mg by mouth 2 (two) times a day, Starting Mon 7/8/2024, Until Tue 7/8/2025, Historical Med      warfarin (COUMADIN) 2 mg tablet take 3 & 1/2 tablets (7MG) once a day Saturday and Sunday and take 2 & 1/2 tablets (5MG) once a day Monday through Friday, Normal           No discharge procedures on file.  ED SEPSIS DOCUMENTATION   Time reflects when diagnosis was documented in both MDM as applicable and the Disposition within this note       Time User Action Codes Description Comment    1/21/2025 11:49 AM Jhonathan Alfaro Add [R07.9] Chest pain, unspecified                   Neo Johnson MD  01/24/25 0044

## 2025-01-24 ENCOUNTER — APPOINTMENT (OUTPATIENT)
Dept: LAB | Facility: CLINIC | Age: 55
End: 2025-01-24
Payer: COMMERCIAL

## 2025-01-24 ENCOUNTER — TELEPHONE (OUTPATIENT)
Dept: HEMATOLOGY ONCOLOGY | Facility: CLINIC | Age: 55
End: 2025-01-24

## 2025-01-24 DIAGNOSIS — Z86.711 HISTORY OF PULMONARY EMBOLISM: ICD-10-CM

## 2025-01-24 LAB
INR PPP: 2.64 (ref 0.85–1.19)
PROTHROMBIN TIME: 28 SECONDS (ref 12.3–15)

## 2025-01-24 PROCEDURE — 36415 COLL VENOUS BLD VENIPUNCTURE: CPT

## 2025-01-24 PROCEDURE — 85610 PROTHROMBIN TIME: CPT

## 2025-01-24 NOTE — TELEPHONE ENCOUNTER
INR 2.64  Informed patient to continue taking coumadin 5 mg Monday through Friday and 2 mg Saturday and Sunday.  Recheck INR next week.  Patient voiced understanding.

## 2025-01-29 ENCOUNTER — OFFICE VISIT (OUTPATIENT)
Dept: PAIN MEDICINE | Facility: CLINIC | Age: 55
End: 2025-01-29
Payer: COMMERCIAL

## 2025-01-29 VITALS — BODY MASS INDEX: 47.09 KG/M2 | HEIGHT: 66 IN | WEIGHT: 293 LBS

## 2025-01-29 DIAGNOSIS — M47.812 CERVICAL SPONDYLOSIS: Primary | ICD-10-CM

## 2025-01-29 DIAGNOSIS — Z79.01 CHRONIC ANTICOAGULATION: ICD-10-CM

## 2025-01-29 PROCEDURE — G2211 COMPLEX E/M VISIT ADD ON: HCPCS | Performed by: NURSE PRACTITIONER

## 2025-01-29 PROCEDURE — 99214 OFFICE O/P EST MOD 30 MIN: CPT | Performed by: NURSE PRACTITIONER

## 2025-01-29 NOTE — PROGRESS NOTES
Assessment:  1. Cervical spondylosis    2. Chronic anticoagulation        Plan:  Patient will be scheduled for repeat left C4-6 RFA.  PT/INR placed for patient to complete prior to procedure.  Complete risks and benefits including bleeding, infection, tissue reaction, nerve injury and allergic reaction were discussed. The patient was agreeable and verbalized an understanding  Patient may continue Tylenol as needed  Will avoid NSAIDs secondary anticoagulation  Patient nonopioid therapy from our practice  Follow-up 6 weeks after ablation or sooner if needed    History of Present Illness:    The patient is a 54 y.o. female with a history of Yan's esophagus, COPD, MAG, substance abuse, significant psychiatric history and history of PE on anticoagulation last seen on 4/4/2024 who presents for a follow up office visit in regards to chronicleft-sided axial neck pain.  Patient did have a left C4-6 RFA completed July 30, 2024 with 75 to 85% improvement of her pain up until this last week.  She does feel like the pain is returning and would be interested in scheduling a repeat ablation.  She has attempted chiropractic therapy in the past which worsened her complaints.  She does receive Botox for migraines.  She uses Tylenol without much relief as she is unable to take NSAIDs secondary to anticoagulation    The patient rates her pain an 8 out of 10 on the numeric pain rating scale.  Pain is described as dull aching, throbbing and pressure-like    I have personally reviewed and/or updated the patient's past medical history, past surgical history, family history, social history, current medications, allergies, and vital signs today.       Review of Systems:    Review of Systems   Respiratory:  Negative for shortness of breath.    Cardiovascular:  Negative for chest pain.   Gastrointestinal:  Negative for constipation, diarrhea, nausea and vomiting.   Musculoskeletal:  Positive for gait problem. Negative for arthralgias,  "joint swelling and myalgias.   Skin:  Negative for rash.   Neurological:  Negative for dizziness, seizures and weakness.   All other systems reviewed and are negative.        Past Medical History:   Diagnosis Date    Acute deep vein thrombosis of lower leg, left ([facility]) 10/16/2023    Acute heart failure, unspecified heart failure type ([facility]) 05/06/2024    Anxiety     Arthritis     oa cassandra knees    Asthma     good control- no medications    Yan's esophagus     Bipolar affective disorder ([facility])     Cannabis abuse with unspecified 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[facility])     Chronic pain disorder     lumbar    Contusion of elbow 12/21/2021    COPD (chronic obstructive pulmonary disease) ([facility])     CPAP (continuous positive airway pressure) dependence     DDD (degenerative disc disease), lumbar 04/09/2015    Degenerative disc disease at L5-S1 level     Deliberate self-cutting     9/15/22per pt has not done recently-- does see a therapist and psychiatrist regularly    Depression 09/16/2008    Diarrhea 01/06/2022    Disease of thyroid gland     hypo    MARTINEZ (dyspnea on exertion)     per pt \"has had this with exertion and not new\"    Drug overdose 10/28/2008    suicide attempt and again drug overdose 7/2022--[facility] floor and than transferred to [facility]    Dysphagia     Dyspnea     Ecchymosis 01/06/2022    Edema     BLE    Elevated troponin 09/02/2023    Family history of blood clots     GERD (gastroesophageal reflux disease)     Grief 11/11/2023    Headache(784.0)     Headache, tension-type     Hemorrhage of rectum and anus 10/02/2017    Formatting of this note might be different from the original.  Added automatically from request for surgery [id number]    High blood pressure     History of COVID-19 12/30/2020    Symptoms started on 12/30/2020 and then got worse.  Today she is feeling a little bit better.  She is a candidate for monoclonal antibody infusion and set for 1/6/21 @ 1pm at [facility]. 01/07/21 - telemedicine -  "    Hyperlipidemia     Hypertension     Ingrown toenail 12/02/2023    Intentional overdose (HCC) 09/27/2023    Intentional self-harm by unspecified sharp object, sequela (HCC) 02/09/2023    Knee pain, bilateral     Especially right    Knee pain, right 02/23/2022    Medial epicondylitis of elbow, left 04/03/2018    Formatting of this note might be different from the original.  Added automatically from request for surgery [id number]    Medial epicondylitis of right elbow 07/17/2023    Medical marijuana use     Memory difficulties 08/06/2020    Memory loss     Migraines     Obesity     Other psychoactive substance abuse with unspecified psychoactive substance-induced disorder (HCC) 05/06/2024    Overactive bladder     Polysubstance abuse (HCC) 09/29/2023    Potential for cognitive impairment 06/17/2021    Primary osteoarthritis of both knees 08/08/2018    Pulmonary emboli ([facility])     Restrictive lung disease 02/01/2017    Last Assessment & Plan:   Formatting of this note might be different from the original.  I reviewed this problem throughout the rest of the note. Please refer to the other assessments for details.    Sjogren's disease (HCC)     Sleep apnea     Stress incontinence     Suicidal deliberate poisoning (HCC) 07/13/2022    Suicidal ideations     Teeth missing     Toxic encephalopathy 11/08/2023    Tremor     per pt Tremors of Right Leg and both Arms    Visual impairment     Wears glasses        Past Surgical History:   Procedure Laterality Date    BREAST CYST EXCISION Right 1989    CARPAL TUNNEL RELEASE Left     CHOLECYSTECTOMY  05/2003    Laparoscopic    COLONOSCOPY      01/12/2009    DILATION AND CURETTAGE OF UTERUS      ELBOW SURGERY Left     x2. No hardware    ESOPHAGOGASTRODUODENOSCOPY      FOOT SURGERY Left     Plantar fasciotomy    HERNIA REPAIR      HYSTERECTOMY      laporoscopic, partial    KNEE ARTHROSCOPY Bilateral     OOPHORECTOMY Left 10/2015    [facility]  8/2/2024    Procedure: RIGHT FOOT LAPIDUS BUNIONECTOMY, 2ND HAMMERTOE REPAIR, SECOND METATARSAL OSTEOTOMY WITH SECOND PLANTAR PLATE REPAIR;  Surgeon: Kaz Bautista DPM;  Location: [facility];  Service: Podiatry    VA GASTROCNEMIUS RECESSION Left 02/24/2021    Procedure: RECESSION GASTROC OPEN;  Surgeon: Nomi Yang DPM;  Location: [facility];  Service: Podiatry    VA RPR UMBILICAL HRNA 5 YRS/> REDUCIBLE N/A 04/24/2019    Procedure: REPAIR HERNIA UMBILICAL LAPAROSCOPIC W/ ROBOTICS;  Surgeon: Anahi Colon MD;  Location: [facility] Main OR;  Service: General    VA SPHINCTEROTOMY ANAL DIVISION SPHINCTER SPX N/A 08/31/2018    Procedure: EUA, LEFT PARTIAL INTERNAL SPHINCTEROTOMY;  Surgeon: Tien Reyes MD;  Location: [facility];  Service: Colorectal    VA TNOT ELBOW LATERAL/MEDIAL DEBRIDE OPEN TDN RPR Left 09/20/2022    Procedure: RELEASE EPICONDYLAR ELBOW MEDIAL;  Surgeon: Kyree Winkler MD;  [facility];  Service: Orthopedics    REDUCTION MAMMAPLASTY Bilateral 1999    REPAIR LACERATION Left     left hand-5/18/2009    REPLACEMENT TOTAL KNEE Right     ROTATOR CUFF REPAIR Left     TONSILECTOMY AND ADNOIDECTOMY      US GUIDED MSK PROCEDURE  04/22/2021    VASCULAR SURGERY      VEIN LIGATION Bilateral     WISDOM TOOTH EXTRACTION         Family History   Problem Relation Age of Onset    Kidney cancer Mother     Diabetes Mother     Depression Mother     Stroke Mother     Arthritis Mother     Cancer Mother     Psychiatric Illness Mother     No Known Problems Father     No Known Problems Sister     No Known Problems Maternal Grandmother     No Known Problems Maternal Grandfather     No Known Problems Paternal Grandmother     No Known Problems Paternal Grandfather     Colon cancer Maternal Uncle     Colon cancer Maternal Uncle     Colon cancer Paternal Uncle     Colon cancer Family     Alcohol abuse Sister     Asthma Sister        Social History     Occupational History    Not on file   Tobacco Use     Smoking status: Former     Current packs/day: 0.00     Average packs/day: 2.0 packs/day for 33.0 years (66.0 ttl pk-yrs)     Types: Cigarettes     Start date: 1985     Quit date: 2018     Years since quittin.0     Passive exposure: Current    Smokeless tobacco: Never    Tobacco comments:     Started at age 16.   Vaping Use    Vaping status: Some Days    Substances: THC   Substance and Sexual Activity    Alcohol use: Not Currently     Comment: Recovering alcoholic    Drug use: Yes     Types: Marijuana     Comment: Former meth use, medicinal marijuana    Sexual activity: Not Currently     Partners: Male     Comment: defer         Current Outpatient Medications:     Aciphex 20 MG tablet, Take by mouth 2 (two) times a day, Disp: , Rfl:     atoMOXetine (STRATTERA) 60 mg capsule, Take 60 mg by mouth daily, Disp: , Rfl:     atorvastatin (LIPITOR) 10 mg tablet, Take 1 tablet (10 mg total) by mouth daily, Disp: 90 tablet, Rfl: 2    Austedo XR 24 MG TB24, , Disp: , Rfl:     benztropine (COGENTIN) 1 mg tablet, , Disp: , Rfl:     buPROPion (WELLBUTRIN XL) 150 mg 24 hr tablet, , Disp: , Rfl:     Cholecalciferol (Vitamin D3) 125 MCG (5000 UT) capsule, Take 1 capsule (5,000 Units total) by mouth daily, Disp: 90 capsule, Rfl: 1    cloNIDine (CATAPRES) 0.2 mg tablet, TAKE 1 TABLET BY MOUTH EVERY 12 HOURS (Patient not taking: Reported on 2025), Disp: 180 tablet, Rfl: 0    cyanocobalamin (VITAMIN B-12) 1000 MCG tablet, Take 1 tablet (1,000 mcg total) by mouth daily, Disp: 90 tablet, Rfl: 1    enoxaparin (LOVENOX) 150 mg/mL injection, Inject 0.9 mL (135 mg total) under the skin every 12 (twelve) hours for 10 days Start with your coumadin post surgery as discussed (Patient not taking: Reported on 2025), Disp: 20 mL, Rfl: 0    furosemide (LASIX) 20 mg tablet, Take 1 tablet (20 mg total) by mouth daily, Disp: 90 tablet, Rfl: 1    Invega Trinza 819 MG/2.63ML DWIGHT, , Disp: , Rfl:     levocetirizine (XYZAL) 5 MG tablet,  TAKE ONE TABLET BY MOUTH IN THE EVENING DAILY, Disp: 90 tablet, Rfl: 1    levothyroxine (Euthyrox) 125 mcg tablet, Take 1 tablet (125 mcg total) by mouth daily in the early morning, Disp: 90 tablet, Rfl: 1    losartan (COZAAR) 100 MG tablet, Take 1 tablet (100 mg total) by mouth daily, Disp: 90 tablet, Rfl: 1    lurasidone (LATUDA) 40 mg tablet, , Disp: , Rfl:     metFORMIN (GLUCOPHAGE-XR) 750 mg 24 hr tablet, Take 1 tablet (750 mg total) by mouth daily with breakfast, Disp: 90 tablet, Rfl: 1    naltrexone (REVIA) 50 mg tablet, TAKE 1 TABLET BY MOUTH DAILY (Patient not taking: Reported on 1/8/2025), Disp: 90 tablet, Rfl: 0    naproxen (Naprosyn) 500 mg tablet, Take 1 tablet (500 mg total) by mouth 2 (two) times a day with meals Take with food., Disp: 30 tablet, Rfl: 0    nitrofurantoin (MACROBID) 100 mg capsule, , Disp: , Rfl:     OXcarbazepine (TRILEPTAL) 150 mg tablet, Take 3 tablets (450 mg total) by mouth every 12 (twelve) hours, Disp: 180 tablet, Rfl: 1    OXcarbazepine (TRILEPTAL) 300 mg tablet, Take 300 mg by mouth every 12 (twelve) hours, Disp: , Rfl:     oxybutynin (DITROPAN) 5 mg tablet, Take 1 tablet (5 mg total) by mouth 2 (two) times a day, Disp: 120 tablet, Rfl: 1    pantoprazole (PROTONIX) 40 mg tablet, Take 1 tablet (40 mg total) by mouth daily in the early morning, Disp: 90 tablet, Rfl: 1    pilocarpine (SALAGEN) 5 mg tablet, TAKE 1 TABLET (5 MG TOTAL) BY MOUTH THREE (THREE) TIMES A DAY, Disp: 270 tablet, Rfl: 4    rOPINIRole (REQUIP) 1 mg tablet, Take by mouth daily, Disp: , Rfl:     topiramate (TOPAMAX) 200 MG tablet, Take 1 tablet (200 mg total) by mouth 2 (two) times a day, Disp: 60 tablet, Rfl: 1    traZODone (DESYREL) 100 mg tablet, , Disp: , Rfl:     trospium chloride (SANCTURA) 20 mg tablet, Take 20 mg by mouth 2 (two) times a day, Disp: , Rfl:     warfarin (COUMADIN) 2 mg tablet, take 3 & 1/2 tablets (7MG) once a day Saturday and Sunday and take 2 & 1/2 tablets (5MG) once a day Monday  "through Friday, Disp: 90 tablet, Rfl: 0    warfarin (COUMADIN) 5 mg tablet, Take 1 tablet (5 mg total) by mouth daily, Disp: 90 tablet, Rfl: 1    Allergies   Allergen Reactions    Percocet [Oxycodone-Acetaminophen] Headache     Severe headaches   (denies issues with Tylenol)    Povidone Iodine Rash     Unsure if betadine or gauze post surgical. Got rash on leg.   Has  Had itchy rashes after every surgery prep and IV insertion    Chlorhexidine Rash     Per pt \"able to use the liquid soap--thinkd reaction from the sponges or wipes\"       Physical Exam:    Ht 5' 6\" (1.676 m)   Wt 136 kg (299 lb)   BMI 48.26 kg/m²     Constitutional:normal, well developed, well nourished, alert, in no distress and non-toxic and no overt pain behavior.  Eyes:anicteric  HEENT:grossly intact  Neck:supple, symmetric, trachea midline and no masses   Pulmonary:even and unlabored  Cardiovascular:No edema or pitting edema present  Skin:Normal without rashes or lesions and well hydrated  Psychiatric:Mood and affect appropriate  Neurologic:Cranial Nerves II-XII grossly intact  Musculoskeletal:normal gait.  The range of motion of the cervical spine with left rotation.  Positive cervical facet loading maneuvers on the left      Imaging  FL spine and pain procedure    (Results Pending)         Orders Placed This Encounter   Procedures    FL spine and pain procedure    Protime-INR       "

## 2025-01-31 ENCOUNTER — APPOINTMENT (OUTPATIENT)
Dept: LAB | Facility: CLINIC | Age: 55
End: 2025-01-31
Payer: COMMERCIAL

## 2025-01-31 ENCOUNTER — TELEPHONE (OUTPATIENT)
Dept: HEMATOLOGY ONCOLOGY | Facility: CLINIC | Age: 55
End: 2025-01-31

## 2025-01-31 DIAGNOSIS — Z86.711 HISTORY OF PULMONARY EMBOLISM: ICD-10-CM

## 2025-01-31 LAB
INR PPP: 2.66 (ref 0.85–1.19)
PROTHROMBIN TIME: 28.1 SECONDS (ref 12.3–15)

## 2025-01-31 PROCEDURE — 36415 COLL VENOUS BLD VENIPUNCTURE: CPT

## 2025-01-31 PROCEDURE — 85610 PROTHROMBIN TIME: CPT

## 2025-01-31 NOTE — TELEPHONE ENCOUNTER
INR 2.66  Informed patient to continue taking coumadin 5 mg Monday through Friday and 2 mg Saturday and Sunday.  Recheck INR next week.  Patient voiced understanding.

## 2025-02-07 ENCOUNTER — TELEPHONE (OUTPATIENT)
Dept: HEMATOLOGY ONCOLOGY | Facility: CLINIC | Age: 55
End: 2025-02-07

## 2025-02-07 ENCOUNTER — APPOINTMENT (OUTPATIENT)
Dept: LAB | Facility: CLINIC | Age: 55
End: 2025-02-07
Payer: COMMERCIAL

## 2025-02-07 DIAGNOSIS — Z79.01 CHRONIC ANTICOAGULATION: ICD-10-CM

## 2025-02-07 DIAGNOSIS — Z86.711 HISTORY OF PULMONARY EMBOLISM: ICD-10-CM

## 2025-02-07 LAB
INR PPP: 2.03 (ref 0.85–1.19)
PROTHROMBIN TIME: 23 SECONDS (ref 12.3–15)

## 2025-02-07 PROCEDURE — 36415 COLL VENOUS BLD VENIPUNCTURE: CPT

## 2025-02-07 PROCEDURE — 85610 PROTHROMBIN TIME: CPT

## 2025-02-07 NOTE — TELEPHONE ENCOUNTER
Spoke with patient and informed her her INR is 2.03, she can continue taking Coumadin 5 mg Monday through Friday and Coumadin 2 mg Saturday and Sunday.  Repeat INR next week.  Patient voiced understanding.

## 2025-02-12 ENCOUNTER — TRANSCRIBE ORDERS (OUTPATIENT)
Dept: LAB | Facility: CLINIC | Age: 55
End: 2025-02-12

## 2025-02-12 DIAGNOSIS — F32.A DEPRESSIVE TYPE PSYCHOSIS: ICD-10-CM

## 2025-02-12 DIAGNOSIS — F42.2 MIXED OBSESSIONAL THOUGHTS AND ACTS: ICD-10-CM

## 2025-02-12 DIAGNOSIS — F60.3 EXPLOSIVE PERSONALITY DISORDER (HCC): ICD-10-CM

## 2025-02-12 DIAGNOSIS — F41.9 ANXIETY DISORDER OF CHILDHOOD OR ADOLESCENCE: ICD-10-CM

## 2025-02-12 DIAGNOSIS — J45.909 ASTHMATIC BRONCHITIS WITHOUT COMPLICATION, UNSPECIFIED ASTHMA SEVERITY, UNSPECIFIED WHETHER PERSISTENT: ICD-10-CM

## 2025-02-12 DIAGNOSIS — F31.81 BIPOLAR II DISORDER (HCC): Primary | ICD-10-CM

## 2025-02-14 ENCOUNTER — APPOINTMENT (OUTPATIENT)
Dept: LAB | Facility: CLINIC | Age: 55
End: 2025-02-14
Payer: COMMERCIAL

## 2025-02-14 DIAGNOSIS — Z86.711 HISTORY OF PULMONARY EMBOLISM: ICD-10-CM

## 2025-02-14 LAB
INR PPP: 2.39 (ref 0.85–1.19)
PROTHROMBIN TIME: 26 SECONDS (ref 12.3–15)

## 2025-02-14 PROCEDURE — 36415 COLL VENOUS BLD VENIPUNCTURE: CPT

## 2025-02-14 PROCEDURE — 85610 PROTHROMBIN TIME: CPT

## 2025-02-16 ENCOUNTER — TELEPHONE (OUTPATIENT)
Dept: HEMATOLOGY ONCOLOGY | Facility: CLINIC | Age: 55
End: 2025-02-16

## 2025-02-16 NOTE — TELEPHONE ENCOUNTER
Spoke with Blanca, informed her that her INR is therapeutic, 2.39.  Recommend she continue the same dose Coumadin 5 mg Monday through Friday and Coumadin 2 mg Saturday and Sunday.

## 2025-02-17 ENCOUNTER — HOSPITAL ENCOUNTER (EMERGENCY)
Facility: HOSPITAL | Age: 55
Discharge: HOME | End: 2025-02-17
Attending: EMERGENCY MEDICINE
Payer: COMMERCIAL

## 2025-02-17 ENCOUNTER — APPOINTMENT (EMERGENCY)
Dept: RADIOLOGY | Facility: HOSPITAL | Age: 55
End: 2025-02-17
Payer: COMMERCIAL

## 2025-02-17 VITALS
HEART RATE: 76 BPM | SYSTOLIC BLOOD PRESSURE: 154 MMHG | TEMPERATURE: 98 F | HEIGHT: 66 IN | DIASTOLIC BLOOD PRESSURE: 82 MMHG | RESPIRATION RATE: 20 BRPM | BODY MASS INDEX: 47.09 KG/M2 | OXYGEN SATURATION: 93 % | WEIGHT: 293 LBS

## 2025-02-17 DIAGNOSIS — R07.9 ACUTE CHEST PAIN: Primary | ICD-10-CM

## 2025-02-17 DIAGNOSIS — R03.0 ELEVATED BLOOD PRESSURE READING: ICD-10-CM

## 2025-02-17 DIAGNOSIS — F25.0 SCHIZOAFFECTIVE DISORDER, BIPOLAR TYPE (HCC): Chronic | ICD-10-CM

## 2025-02-17 LAB
2HR DELTA HS TROPONIN: 1 NG/L
ALBUMIN SERPL BCG-MCNC: 3.8 G/DL (ref 3.5–5)
ALP SERPL-CCNC: 85 U/L (ref 34–104)
ALT SERPL W P-5'-P-CCNC: 16 U/L (ref 7–52)
ANION GAP SERPL CALCULATED.3IONS-SCNC: 7 MMOL/L (ref 4–13)
AST SERPL W P-5'-P-CCNC: 14 U/L (ref 13–39)
BASOPHILS # BLD AUTO: 0.04 THOUSANDS/ΜL (ref 0–0.1)
BASOPHILS NFR BLD AUTO: 0 % (ref 0–1)
BILIRUB SERPL-MCNC: 0.22 MG/DL (ref 0.2–1)
BUN SERPL-MCNC: 13 MG/DL (ref 5–25)
CALCIUM SERPL-MCNC: 9 MG/DL (ref 8.4–10.2)
CARDIAC TROPONIN I PNL SERPL HS: 6 NG/L (ref ?–50)
CARDIAC TROPONIN I PNL SERPL HS: 7 NG/L (ref ?–50)
CHLORIDE SERPL-SCNC: 106 MMOL/L (ref 96–108)
CO2 SERPL-SCNC: 26 MMOL/L (ref 21–32)
CREAT SERPL-MCNC: 0.62 MG/DL (ref 0.6–1.3)
EOSINOPHIL # BLD AUTO: 0.07 THOUSAND/ΜL (ref 0–0.61)
EOSINOPHIL NFR BLD AUTO: 1 % (ref 0–6)
ERYTHROCYTE [DISTWIDTH] IN BLOOD BY AUTOMATED COUNT: 14.8 % (ref 11.6–15.1)
GFR SERPL CREATININE-BSD FRML MDRD: 103 ML/MIN/1.73SQ M
GLUCOSE SERPL-MCNC: 88 MG/DL (ref 65–140)
HCT VFR BLD AUTO: 41.2 % (ref 34.8–46.1)
HGB BLD-MCNC: 13 G/DL (ref 11.5–15.4)
IMM GRANULOCYTES # BLD AUTO: 0.1 THOUSAND/UL (ref 0–0.2)
IMM GRANULOCYTES NFR BLD AUTO: 1 % (ref 0–2)
INR PPP: 2.13 (ref 0.85–1.19)
LYMPHOCYTES # BLD AUTO: 2.21 THOUSANDS/ΜL (ref 0.6–4.47)
LYMPHOCYTES NFR BLD AUTO: 23 % (ref 14–44)
MCH RBC QN AUTO: 28.3 PG (ref 26.8–34.3)
MCHC RBC AUTO-ENTMCNC: 31.6 G/DL (ref 31.4–37.4)
MCV RBC AUTO: 90 FL (ref 82–98)
MONOCYTES # BLD AUTO: 0.85 THOUSAND/ΜL (ref 0.17–1.22)
MONOCYTES NFR BLD AUTO: 9 % (ref 4–12)
NEUTROPHILS # BLD AUTO: 6.54 THOUSANDS/ΜL (ref 1.85–7.62)
NEUTS SEG NFR BLD AUTO: 66 % (ref 43–75)
NRBC BLD AUTO-RTO: 0 /100 WBCS
PLATELET # BLD AUTO: 198 THOUSANDS/UL (ref 149–390)
PMV BLD AUTO: 10 FL (ref 8.9–12.7)
POTASSIUM SERPL-SCNC: 4 MMOL/L (ref 3.5–5.3)
PROT SERPL-MCNC: 6.9 G/DL (ref 6.4–8.4)
PROTHROMBIN TIME: 24.5 SECONDS (ref 12.3–15)
RBC # BLD AUTO: 4.59 MILLION/UL (ref 3.81–5.12)
SODIUM SERPL-SCNC: 139 MMOL/L (ref 135–147)
WBC # BLD AUTO: 9.81 THOUSAND/UL (ref 4.31–10.16)

## 2025-02-17 PROCEDURE — 93005 ELECTROCARDIOGRAM TRACING: CPT

## 2025-02-17 PROCEDURE — 80053 COMPREHEN METABOLIC PANEL: CPT | Performed by: EMERGENCY MEDICINE

## 2025-02-17 PROCEDURE — 85610 PROTHROMBIN TIME: CPT | Performed by: EMERGENCY MEDICINE

## 2025-02-17 PROCEDURE — 84484 ASSAY OF TROPONIN QUANT: CPT | Performed by: EMERGENCY MEDICINE

## 2025-02-17 PROCEDURE — 99285 EMERGENCY DEPT VISIT HI MDM: CPT

## 2025-02-17 PROCEDURE — 36415 COLL VENOUS BLD VENIPUNCTURE: CPT | Performed by: EMERGENCY MEDICINE

## 2025-02-17 PROCEDURE — 85025 COMPLETE CBC W/AUTO DIFF WBC: CPT | Performed by: EMERGENCY MEDICINE

## 2025-02-17 PROCEDURE — 99285 EMERGENCY DEPT VISIT HI MDM: CPT | Performed by: EMERGENCY MEDICINE

## 2025-02-17 PROCEDURE — 71045 X-RAY EXAM CHEST 1 VIEW: CPT

## 2025-02-17 RX ORDER — SUCRALFATE 1 G/1
1 TABLET ORAL ONCE
Status: COMPLETED | OUTPATIENT
Start: 2025-02-17 | End: 2025-02-17

## 2025-02-17 RX ORDER — ACETAMINOPHEN 325 MG/1
650 TABLET ORAL ONCE
Status: COMPLETED | OUTPATIENT
Start: 2025-02-17 | End: 2025-02-17

## 2025-02-17 RX ADMIN — ACETAMINOPHEN 650 MG: 325 TABLET, FILM COATED ORAL at 11:32

## 2025-02-17 RX ADMIN — SUCRALFATE 1 G: 1 TABLET ORAL at 11:32

## 2025-02-17 NOTE — ED PROCEDURE NOTE
Procedure  POC Biliary US    Date/Time: 2/17/2025 12:08 PM    Performed by: Tere Valentine MD  Authorized by: Tere Valentine MD    Patient location:  ED  Performed by:  Resident  Procedure details:     Exam Type:  Educational    Indications: epigastric pain      Assessment for:  Cholecystitis, cholelithiasis and mass    Views obtained: gallbladder (transverse and longitudinal), liver and portal triad      Image quality: non-diagnostic      Image availability:  Images available in PACS  Findings:     Cholelithiasis: not identified      Pericholecystic fluid: not identified      Sonographic Lopez's sign: negative      Polyps: not identified      Mass: not identified    Interpretation:     Biliary ultrasound impressions: normal gallbladder ultrasound    POC Cardiac US    Date/Time: 2/17/2025 12:10 PM    Performed by: Tere Valentine MD  Authorized by: Tere Valentine MD    Patient location:  ED  Procedure details:     Exam Type:  Educational    Indications: chest pain      Assessment / Evaluation for: cardiac function      Exam Type: initial exam      Image quality: non-diagnostic      Image availability:  Images available in PACS  Patient Details:     Cardiac Rhythm:  Regular    Mechanical ventilation: No    Cardiac findings:     Views obtained: parasternal long axis, parasternal short axis and subcostal      Pericardial effusion: absent      Tamponade physiology: absent      Wall motion: normal      LV systolic function: normal                     Tere Valentine MD  02/17/25 1211

## 2025-02-17 NOTE — ED PROVIDER NOTES
Time reflects when diagnosis was documented in both MDM as applicable and the Disposition within this note       Time User Action Codes Description Comment    2/17/2025 11:16 AM LeonardophillipMart MOMO Add [R07.9] Acute chest pain     2/17/2025 11:16 AM Mart Meeks Add [R03.0] Elevated blood pressure reading           ED Disposition       ED Disposition   Discharge    Condition   Stable    Date/Time   Mon Feb 17, 2025  1:11 PM    Comment   Blanca Mason discharge to home/self care.                   Assessment & Plan       Medical Decision Making  Patient is a 54-year-old female, with a history significant for hypertension, hyperlipidemia, family history of cardiac disease, hysterectomy, DVT anticoagulated warfarin primary for the medical record, who presents to the ED today, via EMS, for evaluation of atraumatic, rating down the left arm, pressure/dull/stabbing in character, nonexertional/nonpleuritic/not worsened with laying back chest pain has been constant since 7 AM and waxing and waning course.  There is associated dyspnea and lightheadedness.  Patient states she was recently evaluated for chest pain and states this is the same sensation that she felt previously.  Patient received 324 aspirin prehospital to remit her symptoms.  Standing up quickly exacerbates her lightheadedness.  Patient has not taken anything to remit her symptoms.  Patient is currently afebrile and hemodynamically stable.  Physical exam is notable for 2+ pulses all 4 extremities, no lower extremity edema or pain with calf squeeze, clear heart and lungs, soft nontender abdomen.  This presentation is concerning for: ACS, anemia, GERD as patient describes prandial component.  No reason to suspect PE given anticoagulation/physical exam/history as well as no reason to suspect dissection based on history/physical exam.  Will investigate with cardiac workup.  Will manage with multimodal analgesia and further based upon workup.    Amount  and/or Complexity of Data Reviewed  Labs: ordered. Decision-making details documented in ED Course.  Radiology: ordered and independent interpretation performed.    Risk  OTC drugs.  Prescription drug management.        ED Course as of 02/17/25 1408   Mon Feb 17, 2025   1032 ECG per my independent interpretation: Normal rate, regular rhythm, no ectopy, normal axis, no ST elevations or depressions     1049 Hemoglobin: 13.0  WNL   1112 POCT INR(!): 2.13  Theraputic    1115 hs TnI 0hr: 6  WNL    1310 Delta 2hr hsTnI: 1  WNL   1407 Patient has resolution of symptoms on reevaluation prior to discharge.  Questions answered to her satisfaction.       Medications   sucralfate (CARAFATE) tablet 1 g (1 g Oral Given 2/17/25 1132)   acetaminophen (TYLENOL) tablet 650 mg (650 mg Oral Given 2/17/25 1132)       ED Risk Strat Scores   HEART Risk Score      Flowsheet Row Most Recent Value   Heart Score Risk Calculator    History 1 Filed at: 02/17/2025 1115   ECG 0 Filed at: 02/17/2025 1115   Age 1 Filed at: 02/17/2025 1115   Risk Factors 2 Filed at: 02/17/2025 1115   Troponin 0 Filed at: 02/17/2025 1115   HEART Score 4 Filed at: 02/17/2025 1115          HEART Risk Score      Flowsheet Row Most Recent Value   Heart Score Risk Calculator    History 1 Filed at: 02/17/2025 1115   ECG 0 Filed at: 02/17/2025 1115   Age 1 Filed at: 02/17/2025 1115   Risk Factors 2 Filed at: 02/17/2025 1115   Troponin 0 Filed at: 02/17/2025 1115   HEART Score 4 Filed at: 02/17/2025 1115                              SBIRT 22yo+      Flowsheet Row Most Recent Value   Initial Alcohol Screen: US AUDIT-C     1. How often do you have a drink containing alcohol? 0 Filed at: 02/17/2025 1023   2. How many drinks containing alcohol do you have on a typical day you are drinking?  0 Filed at: 02/17/2025 1023   3b. FEMALE Any Age, or MALE 65+: How often do you have 4 or more drinks on one occassion? 0 Filed at: 02/17/2025 1023   Audit-C Score 0 Filed at: 02/17/2025 1023  "  OLEG: How many times in the past year have you...    Used an illegal drug or used a prescription medication for non-medical reasons? Never Filed at: 02/17/2025 1023                            History of Present Illness       Chief Complaint   Patient presents with    Chest Pain     Pt c/o acute onset of left stabbing chest pain radiating down arm with concurrent \"dizziness\" since waking up at 0700 today        Past Medical History:   Diagnosis Date    Acute deep vein thrombosis of lower leg, left ([facility]) 10/16/2023    Acute heart failure, unspecified heart failure type ([facility]) 05/06/2024    Anxiety     Arthritis     oa cassandra knees    Asthma     good control- no medications    Yan's esophagus     Bipolar affective disorder ([facility])     Cannabis abuse with unspecified 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[facility])     Chronic pain disorder     lumbar    Contusion of elbow 12/21/2021    COPD (chronic obstructive pulmonary disease) ([facility])     CPAP (continuous positive airway pressure) dependence     DDD (degenerative disc disease), lumbar 04/09/2015    Degenerative disc disease at L5-S1 level     Deliberate self-cutting     9/15/22per pt has not done recently-- does see a therapist and psychiatrist regularly    Depression 09/16/2008    Diarrhea 01/06/2022    Disease of thyroid gland     hypo    MARTINEZ (dyspnea on exertion)     per pt \"has had this with exertion and not new\"    Drug overdose 10/28/2008    suicide attempt and again drug overdose 7/2022--[facility]-Medical floor and than transferred to [facility]"" Psych    Dysphagia     Dyspnea     Ecchymosis 01/06/2022    Edema     BLE    Elevated troponin 09/02/2023    Family history of blood clots     GERD (gastroesophageal reflux disease)     Grief 11/11/2023    Headache(784.0)     Headache, tension-type     Hemorrhage of rectum and anus 10/02/2017    Formatting of this note might be different from the original.  Added automatically from request for surgery [id number]    High blood pressure     History of " COVID-19 12/30/2020    Symptoms started on 12/30/2020 and then got worse.  Today she is feeling a little bit better.  She is a candidate for monoclonal antibody infusion and set for 1/6/21 @ 1pm at [facility]. 01/07/21 - telemedicine -     Hyperlipidemia     Hypertension     Ingrown toenail 12/02/2023    Intentional overdose (HCC) 09/27/2023    Intentional self-harm by unspecified sharp object, sequela (HCC) 02/09/2023    Knee pain, bilateral     Especially right    Knee pain, right 02/23/2022    Medial epicondylitis of elbow, left 04/03/2018    Formatting of this note might be different from the original.  Added automatically from request for surgery [id number]    Medial epicondylitis of right elbow 07/17/2023    Medical marijuana use     Memory difficulties 08/06/2020    Memory loss     Migraines     Obesity     Other psychoactive substance abuse with unspecified psychoactive substance-induced disorder (HCC) 05/06/2024    Overactive bladder     Polysubstance abuse (HCC) 09/29/2023    Potential for cognitive impairment 06/17/2021    Primary osteoarthritis of both knees 08/08/2018    Pulmonary emboli (HCC)     Restrictive lung disease 02/01/2017    Last Assessment & Plan:   Formatting of this note might be different from the original.  I reviewed this problem throughout the rest of the note. Please refer to the other assessments for details.    Sjogren's disease (HCC)     Sleep apnea     Stress incontinence     Suicidal deliberate poisoning (HCC) 07/13/2022    Suicidal ideations     Teeth missing     Toxic encephalopathy 11/08/2023    Tremor     per pt Tremors of Right Leg and both Arms    Visual impairment     Wears glasses       Past Surgical History:   Procedure Laterality Date    BREAST CYST EXCISION Right 1989    CARPAL TUNNEL RELEASE Left     CHOLECYSTECTOMY  05/2003    Laparoscopic    COLONOSCOPY      01/12/2009    DILATION AND CURETTAGE OF UTERUS      ELBOW SURGERY Left     x2. No hardware     ESOPHAGOGASTRODUODENOSCOPY      FOOT SURGERY Left     Plantar fasciotomy    HERNIA REPAIR      HYSTERECTOMY      laporoscopic, partial    KNEE ARTHROSCOPY Bilateral     OOPHORECTOMY Left 10/2015    VT CORRJ HLX VLGS BNCTY SESMDC JOINT ARTHRODESIS Right 8/2/2024    Procedure: RIGHT FOOT LAPIDUS BUNIONECTOMY, 2ND HAMMERTOE REPAIR, SECOND METATARSAL OSTEOTOMY WITH SECOND PLANTAR PLATE REPAIR;  Surgeon: Kaz Bautista DPM;  Location: [facility]IN OR;  Service: Podiatry    VT GASTROCNEMIUS RECESSION Left 02/24/2021    Procedure: RECESSION GASTROC OPEN;  Surgeon: Nomi Yang DPM;  Location:  MAIN OR;  Service: Podiatry    VT RPR UMBILICAL HRNA 5 YRS/> REDUCIBLE N/A 04/24/2019    Procedure: REPAIR HERNIA UMBILICAL LAPAROSCOPIC W/ ROBOTICS;  Surgeon: Anahi Colon MD;  Location: [facility] Main OR;  Service: General    VT SPHINCTEROTOMY ANAL DIVISION SPHINCTER SPX N/A 08/31/2018    Procedure: EUA, LEFT PARTIAL INTERNAL SPHINCTEROTOMY;  Surgeon: Tien Reyes MD;  Location: [facility] OR;  Service: Colorectal    VT TNOT ELBOW LATERAL/MEDIAL DEBRIDE OPEN TDN RPR Left 09/20/2022    Procedure: RELEASE EPICONDYLAR ELBOW MEDIAL;  Surgeon: Kyree Winkler MD;  [facility];  Service: Orthopedics    REDUCTION MAMMAPLASTY Bilateral 1999    REPAIR LACERATION Left     left hand-5/18/2009    REPLACEMENT TOTAL KNEE Right     ROTATOR CUFF REPAIR Left     TONSILECTOMY AND ADNOIDECTOMY      US GUIDED MSK PROCEDURE  04/22/2021    VASCULAR SURGERY      VEIN LIGATION Bilateral     WISDOM TOOTH EXTRACTION        Family History   Problem Relation Age of Onset    Kidney cancer Mother     Diabetes Mother     Depression Mother     Stroke Mother     Arthritis Mother     Cancer Mother     Psychiatric Illness Mother     No Known Problems Father     No Known Problems Sister     No Known Problems Maternal Grandmother     No Known Problems Maternal Grandfather     No Known Problems Paternal Grandmother     No Known Problems Paternal  Grandfather     Colon cancer Maternal Uncle     Colon cancer Maternal Uncle     Colon cancer Paternal Uncle     Colon cancer Family     Alcohol abuse Sister     Asthma Sister       Social History     Tobacco Use    Smoking status: Former     Current packs/day: 0.00     Average packs/day: 2.0 packs/day for 33.0 years (66.0 ttl pk-yrs)     Types: Cigarettes     Start date: 1985     Quit date: 2018     Years since quittin.1     Passive exposure: Current    Smokeless tobacco: Never    Tobacco comments:     Started at age 16.   Vaping Use    Vaping status: Some Days    Substances: THC   Substance Use Topics    Alcohol use: Not Currently     Comment: Recovering alcoholic    Drug use: Yes     Types: Marijuana     Comment: Former meth use, medicinal marijuana      E-Cigarette/Vaping    E-Cigarette Use Current Some Day User     Comments medical THC       E-Cigarette/Vaping Substances    Nicotine No     THC Yes     CBD No     Flavoring No     Other No     Unknown No       I have reviewed and agree with the history as documented.     Patient is a 54-year-old female, with a history significant for hypertension, hyperlipidemia, family history of cardiac disease, hysterectomy, DVT anticoagulated warfarin primary for the medical record, who presents to the ED today, via EMS, for evaluation of atraumatic, rating down the left arm, pressure/dull/stabbing in character, nonexertional/nonpleuritic/not worsened with laying back chest pain has been constant since 7 AM and waxing and waning course.  There is associated dyspnea and lightheadedness.  Patient states she was recently evaluated for chest pain and states this is the same sensation that she felt previously.  Standing up quickly exacerbates her lightheadedness.  Patient has not taken anything to remit her symptoms.  Patient is without other concerns at this time.        Review of Systems   Constitutional:  Negative for fever.   HENT:  Negative for trouble swallowing.     Eyes:  Negative for visual disturbance.   Respiratory:  Positive for shortness of breath.    Cardiovascular:  Positive for chest pain.   Gastrointestinal:  Negative for abdominal pain.   Endocrine: Negative for polyuria.   Genitourinary:  Negative for dysuria.   Musculoskeletal:  Negative for gait problem.   Skin:  Negative for rash.   Allergic/Immunologic: Positive for environmental allergies.   Neurological:  Positive for light-headedness. Negative for weakness and numbness.   Hematological:  Negative for adenopathy.   Psychiatric/Behavioral:  Negative for confusion.    All other systems reviewed and are negative.          Objective       ED Triage Vitals [02/17/25 1021]   Temperature Pulse Blood Pressure Respirations SpO2 Patient Position - Orthostatic VS   98 °F (36.7 °C) 75 (!) 173/88 22 95 % Lying      Temp Source Heart Rate Source BP Location FiO2 (%) Pain Score    Oral Monitor Right arm -- 5      Vitals      Date and Time Temp Pulse SpO2 Resp BP Pain Score FACES Pain Rating User   02/17/25 1231 -- 76 93 % 20 154/82 -- -- KO   02/17/25 1132 -- -- -- -- -- 7 -- KO   02/17/25 1021 98 °F (36.7 °C) 75 95 % 22 173/88 5 -- KO            Physical Exam  Vitals and nursing note reviewed.   Constitutional:       General: She is not in acute distress.     Appearance: She is not toxic-appearing.      Comments: Patient appears comfortable during my evaluation    HENT:      Head: Normocephalic and atraumatic.      Right Ear: External ear normal.      Left Ear: External ear normal.      Nose: Nose normal. No rhinorrhea.      Mouth/Throat:      Mouth: Mucous membranes are moist.      Pharynx: Oropharynx is clear. No oropharyngeal exudate or posterior oropharyngeal erythema.      Comments: Uvula midline. No oropharyngeal or submandibular mass/swelling  Eyes:      General: No scleral icterus.        Right eye: No discharge.         Left eye: No discharge.      Conjunctiva/sclera: Conjunctivae normal.      Pupils: Pupils are  equal, round, and reactive to light.   Neck:      Comments: Patient is spontaneously rotating their neck to the left and right during the history and physical exam interaction without difficulty or apparent discomfort    Cardiovascular:      Rate and Rhythm: Normal rate and regular rhythm.      Pulses: Normal pulses.      Heart sounds: Normal heart sounds. No murmur heard.     No friction rub. No gallop.      Comments: 2+ Radial and DP pulses bilaterally  Pulmonary:      Effort: Pulmonary effort is normal. No respiratory distress.      Breath sounds: Normal breath sounds. No stridor. No wheezing, rhonchi or rales.   Abdominal:      General: Abdomen is flat. There is no distension.      Palpations: Abdomen is soft.      Tenderness: There is no abdominal tenderness. There is no right CVA tenderness, left CVA tenderness, guarding or rebound.   Musculoskeletal:         General: No tenderness (No pain with calf squeeze) or deformity.      Cervical back: Neck supple. No tenderness. No muscular tenderness.      Right lower leg: No edema.      Left lower leg: No edema.   Lymphadenopathy:      Cervical: No cervical adenopathy.   Skin:     General: Skin is warm and dry.      Capillary Refill: Capillary refill takes less than 2 seconds.   Neurological:      Mental Status: She is alert.      Comments: Patient is speaking clearly in complete sentences.  Patient is answering appropriately and able follow commands.  Patient is moving all four extremities spontaneously.  No facial droop.  Tongue midline.      Psychiatric:         Mood and Affect: Mood normal.         Behavior: Behavior normal.         Results Reviewed       Procedure Component Value Units Date/Time    HS Troponin I 2hr [[id number]]  (Normal) Collected: 02/17/25 1233    Lab Status: Final result Specimen: Blood from Arm, Left Updated: 02/17/25 1307     hs TnI 2hr 7 ng/L      Delta 2hr hsTnI 1 ng/L     HS Troponin 0hr (reflex protocol) [[id number]]  (Normal) Collected:  02/17/25 1030    Lab Status: Final result Specimen: Blood from Arm, Left Updated: 02/17/25 1114     hs TnI 0hr 6 ng/L     Comprehensive metabolic panel [[id number]] Collected: 02/17/25 1030    Lab Status: Final result Specimen: Blood from Arm, Left Updated: 02/17/25 1107     Sodium 139 mmol/L      Potassium 4.0 mmol/L      Chloride 106 mmol/L      CO2 26 mmol/L      ANION GAP 7 mmol/L      BUN 13 mg/dL      Creatinine 0.62 mg/dL      Glucose 88 mg/dL      Calcium 9.0 mg/dL      AST 14 U/L      ALT 16 U/L      Alkaline Phosphatase 85 U/L      Total Protein 6.9 g/dL      Albumin 3.8 g/dL      Total Bilirubin 0.22 mg/dL      eGFR 103 ml/min/1.73sq m     Narrative:      National Kidney Disease Foundation guidelines for Chronic Kidney Disease (CKD):     Stage 1 with normal or high GFR (GFR > 90 mL/min/1.73 square meters)    Stage 2 Mild CKD (GFR = 60-89 mL/min/1.73 square meters)    Stage 3A Moderate CKD (GFR = 45-59 mL/min/1.73 square meters)    Stage 3B Moderate CKD (GFR = 30-44 mL/min/1.73 square meters)    Stage 4 Severe CKD (GFR = 15-29 mL/min/1.73 square meters)    Stage 5 End Stage CKD (GFR <15 mL/min/1.73 square meters)  Note: GFR calculation is accurate only with a steady state creatinine    Protime-INR [[id number]]  (Abnormal) Collected: 02/17/25 1030    Lab Status: Final result Specimen: Blood from Arm, Left Updated: 02/17/25 1105     Protime 24.5 seconds      INR 2.13    Narrative:      INR Therapeutic Range    Indication                                             INR Range      Atrial Fibrillation                                               2.0-3.0  Hypercoagulable State                                    2.0.2.3  Left Ventricular Asist Device                            2.0-3.0  Mechanical Heart Valve                                  -    Aortic(with afib, MI, embolism, HF, LA enlargement,    and/or coagulopathy)                                     2.0-3.0 (2.5-3.5)     Mitral                                                              2.5-3.5  Prosthetic/Bioprosthetic Heart Valve               2.0-3.0  Venous thromboembolism (VTE: VT, PE        2.0-3.0    CBC and differential [[id number]] Collected: 02/17/25 1030    Lab Status: Final result Specimen: Blood from Arm, Left Updated: 02/17/25 1046     WBC 9.81 Thousand/uL      RBC 4.59 Million/uL      Hemoglobin 13.0 g/dL      Hematocrit 41.2 %      MCV 90 fL      MCH 28.3 pg      MCHC 31.6 g/dL      RDW 14.8 %      MPV 10.0 fL      Platelets 198 Thousands/uL      nRBC 0 /100 WBCs      Segmented % 66 %      Immature Grans % 1 %      Lymphocytes % 23 %      Monocytes % 9 %      Eosinophils Relative 1 %      Basophils Relative 0 %      Absolute Neutrophils 6.54 Thousands/µL      Absolute Immature Grans 0.10 Thousand/uL      Absolute Lymphocytes 2.21 Thousands/µL      Absolute Monocytes 0.85 Thousand/µL      Eosinophils Absolute 0.07 Thousand/µL      Basophils Absolute 0.04 Thousands/µL             XR chest 1 view portable   ED Interpretation by Mart Meeks MD (02/17 1112)   Per my independent interpretation: No acute cardiopulmonary process          Procedures    ED Medication and Procedure Management   Prior to Admission Medications   Prescriptions Last Dose Informant Patient Reported? Taking?   Aciphex 20 MG tablet   Yes No   Sig: Take by mouth 2 (two) times a day   Austedo XR 24 MG TB24   Yes No   Cholecalciferol (Vitamin D3) 125 MCG (5000 UT) capsule   No No   Sig: Take 1 capsule (5,000 Units total) by mouth daily   Invega Trinza 819 MG/2.63ML DWIGHT   Yes No   Patient not taking: Reported on 1/8/2025   OXcarbazepine (TRILEPTAL) 150 mg tablet  Self No No   Sig: Take 3 tablets (450 mg total) by mouth every 12 (twelve) hours   OXcarbazepine (TRILEPTAL) 300 mg tablet  Self Yes No   Sig: Take 300 mg by mouth every 12 (twelve) hours   atoMOXetine (STRATTERA) 60 mg capsule   Yes No   Sig: Take 60 mg by mouth daily   atorvastatin (LIPITOR) 10 mg tablet   No No   Sig:  Take 1 tablet (10 mg total) by mouth daily   benztropine (COGENTIN) 1 mg tablet   Yes No   buPROPion (WELLBUTRIN XL) 150 mg 24 hr tablet   Yes No   cloNIDine (CATAPRES) 0.2 mg tablet   No No   Sig: TAKE 1 TABLET BY MOUTH EVERY 12 HOURS   Patient not taking: Reported on 1/8/2025   cyanocobalamin (VITAMIN B-12) 1000 MCG tablet   No No   Sig: Take 1 tablet (1,000 mcg total) by mouth daily   enoxaparin (LOVENOX) 150 mg/mL injection   No No   Sig: Inject 0.9 mL (135 mg total) under the skin every 12 (twelve) hours for 10 days Start with your coumadin post surgery as discussed   Patient not taking: Reported on 1/8/2025   furosemide (LASIX) 20 mg tablet   No No   Sig: Take 1 tablet (20 mg total) by mouth daily   levocetirizine (XYZAL) 5 MG tablet   No No   Sig: TAKE ONE TABLET BY MOUTH IN THE EVENING DAILY   levothyroxine (Euthyrox) 125 mcg tablet   No No   Sig: Take 1 tablet (125 mcg total) by mouth daily in the early morning   losartan (COZAAR) 100 MG tablet   No No   Sig: Take 1 tablet (100 mg total) by mouth daily   lurasidone (LATUDA) 40 mg tablet  Self Yes No   metFORMIN (GLUCOPHAGE-XR) 750 mg 24 hr tablet   No No   Sig: Take 1 tablet (750 mg total) by mouth daily with breakfast   naltrexone (REVIA) 50 mg tablet   No No   Sig: TAKE 1 TABLET BY MOUTH DAILY   Patient not taking: Reported on 1/8/2025   naproxen (Naprosyn) 500 mg tablet   No No   Sig: Take 1 tablet (500 mg total) by mouth 2 (two) times a day with meals Take with food.   nitrofurantoin (MACROBID) 100 mg capsule   Yes No   Patient not taking: Reported on 1/8/2025   oxybutynin (DITROPAN) 5 mg tablet   No No   Sig: Take 1 tablet (5 mg total) by mouth 2 (two) times a day   pantoprazole (PROTONIX) 40 mg tablet  Self No No   Sig: Take 1 tablet (40 mg total) by mouth daily in the early morning   pilocarpine (SALAGEN) 5 mg tablet   No No   Sig: TAKE 1 TABLET (5 MG TOTAL) BY MOUTH THREE (THREE) TIMES A DAY   rOPINIRole (REQUIP) 1 mg tablet   Yes No   Sig: Take by  mouth daily   topiramate (TOPAMAX) 200 MG tablet  Self No No   Sig: Take 1 tablet (200 mg total) by mouth 2 (two) times a day   traZODone (DESYREL) 100 mg tablet   Yes No   trospium chloride (SANCTURA) 20 mg tablet   Yes No   Sig: Take 20 mg by mouth 2 (two) times a day   warfarin (COUMADIN) 2 mg tablet   No No   Sig: take 3 & 1/2 tablets (7MG) once a day Saturday and Sunday and take 2 & 1/2 tablets (5MG) once a day Monday through Friday   warfarin (COUMADIN) 5 mg tablet   No No   Sig: Take 1 tablet (5 mg total) by mouth daily      Facility-Administered Medications: None     Discharge Medication List as of 2/17/2025  1:11 PM        CONTINUE these medications which have NOT CHANGED    Details   Aciphex 20 MG tablet Take by mouth 2 (two) times a day, Starting Thu 8/8/2024, Historical Med      atoMOXetine (STRATTERA) 60 mg capsule Take 60 mg by mouth daily, Starting Wed 3/6/2024, Historical Med      atorvastatin (LIPITOR) 10 mg tablet Take 1 tablet (10 mg total) by mouth daily, Starting Wed 9/18/2024, Normal      Austedo XR 24 MG TB24 Historical Med      benztropine (COGENTIN) 1 mg tablet Historical Med      buPROPion (WELLBUTRIN XL) 150 mg 24 hr tablet Historical Med      Cholecalciferol (Vitamin D3) 125 MCG (5000 UT) capsule Take 1 capsule (5,000 Units total) by mouth daily, Starting Thu 4/4/2024, Normal      cloNIDine (CATAPRES) 0.2 mg tablet TAKE 1 TABLET BY MOUTH EVERY 12 HOURS, Starting Tue 10/29/2024, Normal      cyanocobalamin (VITAMIN B-12) 1000 MCG tablet Take 1 tablet (1,000 mcg total) by mouth daily, Starting Thu 4/4/2024, Until Wed 1/8/2025, Normal      enoxaparin (LOVENOX) 150 mg/mL injection Inject 0.9 mL (135 mg total) under the skin every 12 (twelve) hours for 10 days Start with your coumadin post surgery as discussed, Starting Thu 8/8/2024, Until Sun 8/18/2024, Normal      furosemide (LASIX) 20 mg tablet Take 1 tablet (20 mg total) by mouth daily, Starting Wed 9/18/2024, Normal      Invega Trinza 819  MG/2.63ML DWIGHT Historical Med      levocetirizine (XYZAL) 5 MG tablet TAKE ONE TABLET BY MOUTH IN THE EVENING DAILY, Starting Wed 8/21/2024, Normal      levothyroxine (Euthyrox) 125 mcg tablet Take 1 tablet (125 mcg total) by mouth daily in the early morning, Starting Wed 9/18/2024, Normal      losartan (COZAAR) 100 MG tablet Take 1 tablet (100 mg total) by mouth daily, Starting Wed 9/18/2024, Normal      lurasidone (LATUDA) 40 mg tablet Historical Med      metFORMIN (GLUCOPHAGE-XR) 750 mg 24 hr tablet Take 1 tablet (750 mg total) by mouth daily with breakfast, Starting Wed 9/18/2024, Normal      naltrexone (REVIA) 50 mg tablet TAKE 1 TABLET BY MOUTH DAILY, Starting Tue 8/6/2024, Normal      naproxen (Naprosyn) 500 mg tablet Take 1 tablet (500 mg total) by mouth 2 (two) times a day with meals Take with food., Starting Thu 10/3/2024, Normal      nitrofurantoin (MACROBID) 100 mg capsule Historical Med      OXcarbazepine (TRILEPTAL) 300 mg tablet Take 300 mg by mouth every 12 (twelve) hours, Starting Fri 2/2/2024, Historical Med      oxybutynin (DITROPAN) 5 mg tablet Take 1 tablet (5 mg total) by mouth 2 (two) times a day, Starting Thu 4/4/2024, Until Wed 1/8/2025, Normal      pantoprazole (PROTONIX) 40 mg tablet Take 1 tablet (40 mg total) by mouth daily in the early morning, Starting Thu 4/4/2024, Normal      pilocarpine (SALAGEN) 5 mg tablet TAKE 1 TABLET (5 MG TOTAL) BY MOUTH THREE (THREE) TIMES A DAY, Normal      rOPINIRole (REQUIP) 1 mg tablet Take by mouth daily, Historical Med      topiramate (TOPAMAX) 200 MG tablet Take 1 tablet (200 mg total) by mouth 2 (two) times a day, Starting Tue 1/30/2024, Until Wed 1/8/2025, Normal      traZODone (DESYREL) 100 mg tablet Historical Med      trospium chloride (SANCTURA) 20 mg tablet Take 20 mg by mouth 2 (two) times a day, Starting Mon 7/8/2024, Until Tue 7/8/2025, Historical Med      warfarin (COUMADIN) 2 mg tablet take 3 & 1/2 tablets (7MG) once a day Saturday and  Sunday and take 2 & 1/2 tablets (5MG) once a day Monday through Friday, Normal             ED SEPSIS DOCUMENTATION   Time reflects when diagnosis was documented in both MDM as applicable and the Disposition within this note       Time User Action Codes Description Comment    2/17/2025 11:16 AM Mart Meeks Add [R07.9] Acute chest pain     2/17/2025 11:16 AM Mart Meeks Add [R03.0] Elevated blood pressure reading                  Mart Meeks MD  02/17/25 1408

## 2025-02-18 ENCOUNTER — TELEPHONE (OUTPATIENT)
Age: 55
End: 2025-02-18

## 2025-02-18 ENCOUNTER — APPOINTMENT (OUTPATIENT)
Dept: LAB | Facility: CLINIC | Age: 55
End: 2025-02-18
Payer: COMMERCIAL

## 2025-02-18 DIAGNOSIS — R05.2 SUBACUTE COUGH: ICD-10-CM

## 2025-02-18 DIAGNOSIS — Z86.711 HISTORY OF PULMONARY EMBOLISM: ICD-10-CM

## 2025-02-18 DIAGNOSIS — E66.01 MORBID OBESITY WITH BMI OF 45.0-49.9, ADULT (HCC): ICD-10-CM

## 2025-02-18 LAB
INR PPP: 1.91 (ref 0.85–1.19)
PROTHROMBIN TIME: 22 SECONDS (ref 12.3–15)

## 2025-02-18 PROCEDURE — 36415 COLL VENOUS BLD VENIPUNCTURE: CPT

## 2025-02-18 PROCEDURE — 85610 PROTHROMBIN TIME: CPT

## 2025-02-18 RX ORDER — WARFARIN SODIUM 5 MG/1
5 TABLET ORAL
Qty: 90 TABLET | Refills: 1 | Status: SHIPPED | OUTPATIENT
Start: 2025-02-18 | End: 2025-08-17

## 2025-02-18 NOTE — TELEPHONE ENCOUNTER
Call from Charleen, wanting Heather to know that she is having a procedure at [facility] tomorrow on her [facility]. She stated that they told her she did not need to hold her coumadin.

## 2025-02-18 NOTE — TELEPHONE ENCOUNTER
Medication: Coumadin    Dose/Frequency: 5 mg/take as directed    Quantity: 90    Pharmacy: [facility], [address], [place]    Office:   [] PCP/Provider -   [x] Speciality/Provider - JHONATAN Lee    Does the patient have enough for 3 days?   [x] Yes   [] No - Send as HP to POD

## 2025-02-19 ENCOUNTER — TELEPHONE (OUTPATIENT)
Dept: RADIOLOGY | Facility: CLINIC | Age: 55
End: 2025-02-19

## 2025-02-19 ENCOUNTER — HOSPITAL ENCOUNTER (OUTPATIENT)
Dept: RADIOLOGY | Facility: CLINIC | Age: 55
Discharge: HOME | End: 2025-02-19
Payer: COMMERCIAL

## 2025-02-19 ENCOUNTER — RESULTS FOLLOW-UP (OUTPATIENT)
Dept: HEMATOLOGY ONCOLOGY | Facility: CLINIC | Age: 55
End: 2025-02-19

## 2025-02-19 VITALS
TEMPERATURE: 97.7 F | SYSTOLIC BLOOD PRESSURE: 153 MMHG | RESPIRATION RATE: 18 BRPM | DIASTOLIC BLOOD PRESSURE: 71 MMHG | OXYGEN SATURATION: 97 % | HEART RATE: 80 BPM

## 2025-02-19 DIAGNOSIS — M47.812 CERVICAL SPONDYLOSIS: ICD-10-CM

## 2025-02-19 LAB
ATRIAL RATE: 76 BPM
P AXIS: 51 DEGREES
PR INTERVAL: 198 MS
QRS AXIS: -22 DEGREES
QRSD INTERVAL: 100 MS
QT INTERVAL: 376 MS
QTC INTERVAL: 423 MS
T WAVE AXIS: 53 DEGREES
VENTRICULAR RATE: 76 BPM

## 2025-02-19 PROCEDURE — 64634 DESTROY C/TH FACET JNT ADDL: CPT | Performed by: ANESTHESIOLOGY

## 2025-02-19 PROCEDURE — 93010 ELECTROCARDIOGRAM REPORT: CPT | Performed by: INTERNAL MEDICINE

## 2025-02-19 PROCEDURE — 64633 DESTROY CERV/THOR FACET JNT: CPT | Performed by: ANESTHESIOLOGY

## 2025-02-19 RX ORDER — CLONIDINE HYDROCHLORIDE 0.2 MG/1
0.2 TABLET ORAL EVERY 12 HOURS
Qty: 180 TABLET | Refills: 1 | Status: SHIPPED | OUTPATIENT
Start: 2025-02-19 | End: 2025-02-20 | Stop reason: SDUPTHER

## 2025-02-19 RX ORDER — LEVOCETIRIZINE DIHYDROCHLORIDE 5 MG/1
5 TABLET, FILM COATED ORAL EVERY EVENING
Qty: 90 TABLET | Refills: 1 | Status: SHIPPED | OUTPATIENT
Start: 2025-02-19

## 2025-02-19 RX ORDER — LIDOCAINE HYDROCHLORIDE 10 MG/ML
8 INJECTION, SOLUTION EPIDURAL; INFILTRATION; INTRACAUDAL; PERINEURAL ONCE
Status: COMPLETED | OUTPATIENT
Start: 2025-02-19 | End: 2025-02-19

## 2025-02-19 RX ORDER — NALTREXONE HYDROCHLORIDE 50 MG/1
50 TABLET, FILM COATED ORAL DAILY
Qty: 90 TABLET | Refills: 4 | Status: SHIPPED | OUTPATIENT
Start: 2025-02-19

## 2025-02-19 RX ORDER — BUPIVACAINE HYDROCHLORIDE 5 MG/ML
3 INJECTION, SOLUTION EPIDURAL; INTRACAUDAL ONCE
Status: COMPLETED | OUTPATIENT
Start: 2025-02-19 | End: 2025-02-19

## 2025-02-19 RX ADMIN — LIDOCAINE HYDROCHLORIDE 8 ML: 10 INJECTION, SOLUTION EPIDURAL; INFILTRATION; INTRACAUDAL; PERINEURAL at 13:42

## 2025-02-19 RX ADMIN — BUPIVACAINE HYDROCHLORIDE 3 ML: 5 INJECTION, SOLUTION EPIDURAL; INTRACAUDAL; PERINEURAL at 13:46

## 2025-02-19 RX ADMIN — LIDOCAINE HYDROCHLORIDE 3 ML: 20 INJECTION, SOLUTION EPIDURAL; INFILTRATION; INTRACAUDAL; PERINEURAL at 13:46

## 2025-02-19 NOTE — H&P
"History of Present Illness: The patient is a 54 y.o. female who presents with complaints of neck pain.    Past Medical History:   Diagnosis Date    Acute deep vein thrombosis of lower leg, left (HCC) 10/16/2023    Acute heart failure, unspecified heart failure type ([facility]) 05/06/2024    Anxiety     Arthritis     oa cassandra knees    Asthma     good control- no medications    Yan's esophagus     Bipolar affective disorder ([facility])     Cannabis abuse with unspecified 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[facility])     Chronic pain disorder     lumbar    Contusion of elbow 12/21/2021    COPD (chronic obstructive pulmonary disease) ([facility])     CPAP (continuous positive airway pressure) dependence     DDD (degenerative disc disease), lumbar 04/09/2015    Degenerative disc disease at L5-S1 level     Deliberate self-cutting     9/15/22per pt has not done recently-- does see a therapist and psychiatrist regularly    Depression 09/16/2008    Diarrhea 01/06/2022    Disease of thyroid gland     hypo    MARTINEZ (dyspnea on exertion)     per pt \"has had this with exertion and not new\"    Drug overdose 10/28/2008    suicide attempt and again drug overdose 7/2022--[facility]Medical floor and than transferred to [facility]    Dysphagia     Dyspnea     Ecchymosis 01/06/2022    Edema     BLE    Elevated troponin 09/02/2023    Family history of blood clots     GERD (gastroesophageal reflux disease)     Grief 11/11/2023    Headache(784.0)     Headache, tension-type     Hemorrhage of rectum and anus 10/02/2017    Formatting of this note might be different from the original.  Added automatically from request for surgery [id number]    High blood pressure     History of COVID-19 12/30/2020    Symptoms started on 12/30/2020 and then got worse.  Today she is feeling a little bit better.  She is a candidate for monoclonal antibody infusion and set for 1/6/21 @ 1pm at [facility]. 01/07/21 - telemedicine -     Hyperlipidemia     Hypertension     Ingrown toenail 12/02/2023    " Intentional overdose (HCC) 09/27/2023    Intentional self-harm by unspecified sharp object, sequela (HCC) 02/09/2023    Knee pain, bilateral     Especially right    Knee pain, right 02/23/2022    Medial epicondylitis of elbow, left 04/03/2018    Formatting of this note might be different from the original.  Added automatically from request for surgery [id number]    Medial epicondylitis of right elbow 07/17/2023    Medical marijuana use     Memory difficulties 08/06/2020    Memory loss     Migraines     Obesity     Other psychoactive substance abuse with unspecified psychoactive substance-induced disorder (HCC) 05/06/2024    Overactive bladder     Polysubstance abuse (HCC) 09/29/2023    Potential for cognitive impairment 06/17/2021    Primary osteoarthritis of both knees 08/08/2018    Pulmonary emboli ([facility])     Restrictive lung disease 02/01/2017    Last Assessment & Plan:   Formatting of this note might be different from the original.  I reviewed this problem throughout the rest of the note. Please refer to the other assessments for details.    Sjogren's disease ([facility])     Sleep apnea     Stress incontinence     Suicidal deliberate poisoning ([facility]) 07/13/2022    Suicidal ideations     Teeth missing     Toxic encephalopathy 11/08/2023    Tremor     per pt Tremors of Right Leg and both Arms    Visual impairment     Wears glasses        Past Surgical History:   Procedure Laterality Date    BREAST CYST EXCISION Right 1989    CARPAL TUNNEL RELEASE Left     CHOLECYSTECTOMY  05/2003    Laparoscopic    COLONOSCOPY      01/12/2009    DILATION AND CURETTAGE OF UTERUS      ELBOW SURGERY Left     x2. No hardware    ESOPHAGOGASTRODUODENOSCOPY      FOOT SURGERY Left     Plantar fasciotomy    HERNIA REPAIR      HYSTERECTOMY      laporoscopic, partial    KNEE ARTHROSCOPY Bilateral     OOPHORECTOMY Left 10/2015    SD CORRJ HLX VLGS BNCTY [facility] JOINT ARTHRODESIS Right 8/2/2024    Procedure: RIGHT FOOT LAPIDUS BUNIONECTOMY, 2ND HAMMERTOE  REPAIR, SECOND METATARSAL OSTEOTOMY WITH SECOND PLANTAR PLATE REPAIR;  Surgeon: Kaz Bautista DPM;  Location: [facility];  Service: Podiatry    OR GASTROCNEMIUS RECESSION Left 02/24/2021    Procedure: RECESSION GASTROC OPEN;  Surgeon: Nomi Yang DPM;  Location: [facility];  Service: Podiatry    OR RPR UMBILICAL HRNA 5 YRS/> REDUCIBLE N/A 04/24/2019    Procedure: REPAIR HERNIA UMBILICAL LAPAROSCOPIC W/ ROBOTICS;  Surgeon: Anahi Colon MD;  Location: [facility];  Service: General    OR SPHINCTEROTOMY ANAL DIVISION SPHINCTER SPX N/A 08/31/2018    Procedure: EUA, LEFT PARTIAL INTERNAL SPHINCTEROTOMY;  Surgeon: Tien Reyes MD;  Location: [facility];  Service: Colorectal    OR TNOT ELBOW LATERAL/MEDIAL DEBRIDE OPEN TDN RPR Left 09/20/2022    Procedure: RELEASE EPICONDYLAR ELBOW MEDIAL;  Surgeon: Kyree Winkler MD;  [facility];  Service: Orthopedics    REDUCTION MAMMAPLASTY Bilateral 1999    REPAIR LACERATION Left     left hand-5/18/2009    REPLACEMENT TOTAL KNEE Right     ROTATOR CUFF REPAIR Left     TONSILECTOMY AND ADNOIDECTOMY      US GUIDED MSK PROCEDURE  04/22/2021    VASCULAR SURGERY      VEIN LIGATION Bilateral     WISDOM TOOTH EXTRACTION           Current Outpatient Medications:     Aciphex 20 MG tablet, Take by mouth 2 (two) times a day, Disp: , Rfl:     atoMOXetine (STRATTERA) 60 mg capsule, Take 60 mg by mouth daily, Disp: , Rfl:     atorvastatin (LIPITOR) 10 mg tablet, Take 1 tablet (10 mg total) by mouth daily, Disp: 90 tablet, Rfl: 2    Austedo XR 24 MG TB24, , Disp: , Rfl:     benztropine (COGENTIN) 1 mg tablet, , Disp: , Rfl:     buPROPion (WELLBUTRIN XL) 150 mg 24 hr tablet, , Disp: , Rfl:     Cholecalciferol (Vitamin D3) 125 MCG (5000 UT) capsule, Take 1 capsule (5,000 Units total) by mouth daily, Disp: 90 capsule, Rfl: 1    cloNIDine (CATAPRES) 0.2 mg tablet, TAKE 1 TABLET BY MOUTH EVERY 12 HOURS, Disp: 180 tablet, Rfl: 1    cyanocobalamin (VITAMIN B-12) 1000 MCG  tablet, Take 1 tablet (1,000 mcg total) by mouth daily, Disp: 90 tablet, Rfl: 1    enoxaparin (LOVENOX) 150 mg/mL injection, Inject 0.9 mL (135 mg total) under the skin every 12 (twelve) hours for 10 days Start with your coumadin post surgery as discussed (Patient not taking: Reported on 1/8/2025), Disp: 20 mL, Rfl: 0    furosemide (LASIX) 20 mg tablet, Take 1 tablet (20 mg total) by mouth daily, Disp: 90 tablet, Rfl: 1    Invega Trinza 819 MG/2.63ML DWIGHT, , Disp: , Rfl:     levocetirizine (XYZAL) 5 MG tablet, TAKE ONE TABLET BY MOUTH IN THE EVENING DAILY, Disp: 90 tablet, Rfl: 1    levothyroxine (Euthyrox) 125 mcg tablet, Take 1 tablet (125 mcg total) by mouth daily in the early morning, Disp: 90 tablet, Rfl: 1    losartan (COZAAR) 100 MG tablet, Take 1 tablet (100 mg total) by mouth daily, Disp: 90 tablet, Rfl: 1    lurasidone (LATUDA) 40 mg tablet, , Disp: , Rfl:     metFORMIN (GLUCOPHAGE-XR) 750 mg 24 hr tablet, Take 1 tablet (750 mg total) by mouth daily with breakfast, Disp: 90 tablet, Rfl: 1    naltrexone (REVIA) 50 mg tablet, TAKE 1 TABLET BY MOUTH DAILY (Patient not taking: Reported on 1/8/2025), Disp: 90 tablet, Rfl: 0    naproxen (Naprosyn) 500 mg tablet, Take 1 tablet (500 mg total) by mouth 2 (two) times a day with meals Take with food., Disp: 30 tablet, Rfl: 0    nitrofurantoin (MACROBID) 100 mg capsule, , Disp: , Rfl:     OXcarbazepine (TRILEPTAL) 150 mg tablet, Take 3 tablets (450 mg total) by mouth every 12 (twelve) hours, Disp: 180 tablet, Rfl: 1    OXcarbazepine (TRILEPTAL) 300 mg tablet, Take 300 mg by mouth every 12 (twelve) hours, Disp: , Rfl:     oxybutynin (DITROPAN) 5 mg tablet, Take 1 tablet (5 mg total) by mouth 2 (two) times a day, Disp: 120 tablet, Rfl: 1    pantoprazole (PROTONIX) 40 mg tablet, Take 1 tablet (40 mg total) by mouth daily in the early morning, Disp: 90 tablet, Rfl: 1    pilocarpine (SALAGEN) 5 mg tablet, TAKE 1 TABLET (5 MG TOTAL) BY MOUTH THREE (THREE) TIMES A DAY, Disp:  "270 tablet, Rfl: 4    rOPINIRole (REQUIP) 1 mg tablet, Take by mouth daily, Disp: , Rfl:     topiramate (TOPAMAX) 200 MG tablet, Take 1 tablet (200 mg total) by mouth 2 (two) times a day, Disp: 60 tablet, Rfl: 1    traZODone (DESYREL) 100 mg tablet, , Disp: , Rfl:     trospium chloride (SANCTURA) 20 mg tablet, Take 20 mg by mouth 2 (two) times a day, Disp: , Rfl:     warfarin (COUMADIN) 2 mg tablet, take 3 & 1/2 tablets (7MG) once a day Saturday and Sunday and take 2 & 1/2 tablets (5MG) once a day Monday through Friday, Disp: 90 tablet, Rfl: 0    warfarin (COUMADIN) 5 mg tablet, Take 1 tablet (5 mg total) by mouth daily, Disp: 90 tablet, Rfl: 1    Allergies   Allergen Reactions    Percocet [Oxycodone-Acetaminophen] Headache     Severe headaches   (denies issues with Tylenol)    Povidone Iodine Rash     Unsure if betadine or gauze post surgical. Got rash on leg.   Has  Had itchy rashes after every surgery prep and IV insertion    Chlorhexidine Rash     Per pt \"able to use the liquid soap--thinkd reaction from the sponges or wipes\"       Physical Exam:   Vitals:    02/19/25 1324   BP: (!) 163/123   Pulse: 80   Resp: 18   Temp: 97.7 °F (36.5 °C)   SpO2: 98%     General: Awake, Alert, Oriented x 3, Mood and affect appropriate  Respiratory: Respirations even and unlabored  Cardiovascular: Peripheral pulses intact; no edema  Musculoskeletal Exam: normal gait    ASA Score: 3    Patient/Chart Verification  Patient ID Verified: Verbal  ID Band Applied: No  H&P( within 30 days) Verified: To be obtained in the Procedural area  Allergies Reviewed: Yes  Anticoag/NSAID held?: No  Currently on antibiotics?: No    Assessment:   1. Cervical spondylosis        Plan: Repeat left C4-6 RFA    "

## 2025-02-19 NOTE — DISCHARGE INSTR - LAB
Medial Branch Radiofrequency Ablation     WHAT YOU NEED TO KNOW:   Medial branch radiofrequency ablation (RFA) is a procedure used to treat facet joint pain in your neck, mid back, or lower back. Facet joints are found at the back of each vertebra. A needle electrode is used to send electrical currents to the nerves in your facet joint. The electrical currents create heat that damages the nerve so it cannot send pain signals.     ACTIVITY  Do not drive or operate machinery today.  No strenuous activity today - bending, lifting, etc.  You may shower today, but do not sit in a tub of water.  Resume normal activities tomorrow as tolerated.    CARE OF THE INJECTION SITE  If you have soreness or pain, apply ice to the area today (20 minutes on/20 minutes off).  Starting tomorrow, you may use warm, moist heat or ice if needed.  Notify the [facility] if you have any of the following: redness, drainage, swelling, or fever above 100°F.    SPECIAL INSTRUCTIONS  Our office will call you tomorrow for a progress report and make an appointment for a follow up visit in 4 weeks.  If you feel a sunburn-like sensation in the area of your procedure, call our office.    MEDICATIONS  Continue to take all routine medications.  Our office may have instructed you to hold some medications.    As no general anesthesia was used in today's procedure, you should not experience any side effects related to anesthesia.     If you have a problem specifically related to your procedure, please call our office at [phone].  Problems not related to your procedure should be directed to your primary care physician.

## 2025-02-20 ENCOUNTER — OFFICE VISIT (OUTPATIENT)
Dept: CARDIOLOGY CLINIC | Facility: CLINIC | Age: 55
End: 2025-02-20
Payer: COMMERCIAL

## 2025-02-20 VITALS
HEART RATE: 81 BPM | DIASTOLIC BLOOD PRESSURE: 90 MMHG | SYSTOLIC BLOOD PRESSURE: 150 MMHG | BODY MASS INDEX: 47.09 KG/M2 | HEIGHT: 66 IN | OXYGEN SATURATION: 98 % | WEIGHT: 293 LBS

## 2025-02-20 DIAGNOSIS — I10 BENIGN ESSENTIAL HYPERTENSION: Primary | Chronic | ICD-10-CM

## 2025-02-20 DIAGNOSIS — R03.0 ELEVATED BLOOD PRESSURE READING: ICD-10-CM

## 2025-02-20 DIAGNOSIS — R07.9 ACUTE CHEST PAIN: ICD-10-CM

## 2025-02-20 DIAGNOSIS — Z86.718 HISTORY OF DVT (DEEP VEIN THROMBOSIS): ICD-10-CM

## 2025-02-20 DIAGNOSIS — F25.0 SCHIZOAFFECTIVE DISORDER, BIPOLAR TYPE (HCC): Chronic | ICD-10-CM

## 2025-02-20 DIAGNOSIS — E78.2 MIXED HYPERLIPIDEMIA: ICD-10-CM

## 2025-02-20 PROCEDURE — 99204 OFFICE O/P NEW MOD 45 MIN: CPT | Performed by: INTERNAL MEDICINE

## 2025-02-20 RX ORDER — CLONIDINE HYDROCHLORIDE 0.3 MG/1
0.3 TABLET ORAL 2 TIMES DAILY
Qty: 180 TABLET | Refills: 3 | Status: SHIPPED | OUTPATIENT
Start: 2025-02-20

## 2025-02-20 NOTE — PROGRESS NOTES
Cardiology Consultation     Blanca Mason  [id number]  1970  [facility]  [address]  [address]  [facility] [address]      Diagnoses and all orders for this visit:    Benign essential hypertension  -     NM myocardial perfusion spect (rx stress and/or rest); Future    Acute chest pain  -     Ambulatory Referral to Cardiology  -     NM myocardial perfusion spect (rx stress and/or rest); Future    Elevated blood pressure reading  -     Ambulatory Referral to Cardiology  -     NM myocardial perfusion spect (rx stress and/or rest); Future    History of DVT (deep vein thrombosis)    Mixed hyperlipidemia  -     NM myocardial perfusion spect (rx stress and/or rest); Future    BMI 45.0-49.9, adult ([facility])        I had the pleasure of seeing Blanca Mason for a consultation regarding chest discomfort requested by Shameka ISRAEL    History of the Presenting Illness, Discussion/Summary and my Plan are as follows:::    Blanca is a pleasant 54-year-old lady with hypertension and dyslipidemia-both on treatment, impaired fasting glucose-on metformin, history of depression, anxiety, bipolar disorder.    She also has a history of saddle PE-2023 and hence is on Coumadin.  She has a history of prior alcoholism, has been sober for the last 5 years, History of methamphetamine use-last around 2024, is currently on medical marijuana.  She has a history of tobacco use-45 pack years-quit around .    Family history-father has an irregular heartbeat but no further diagnosis, he is 77 without any vascular issues.  Mother  at 74-ultimately had to go on hospice since she needed a feeding tube.  She has a brother who is 2 years older and well.  She has a history of clotting disorders in the family.  She does not have children.    She is modestly active, works in a [employer] company, no symptoms with  activity.    She has had multiple presentations with chest pains to the ER, can last many hours, With no clear precipitating or relieving factors, felt as a pressure, no associated shortness of breath or sweating.  She has also had heartburn symptoms felt as a burning sensation with waterbrash, felt in the center of her chest, mostly brought on by food.  During her most recent ER visit-February 2025, was given sucralfate and Tylenol with improvement in symptoms.    She has also had orthostatic dizziness, only drinks about 1 bottle-16 ounces of fluid in a day.  She is now trying to increase her hydration status.    Normal cardiac exam except for positive orthostatics recent ECGs in the ER were Negative for ischemia, personally reviewed    Plan:    Chest pain: Has risk factors including hypertension, dyslipidemia and impaired fasting glucose.  Previously her lipid's were markedly elevated such as in 2017 with an LDL of 196.  It is unclear how long she has been on the statin.  Check pharmacologic nuclear stress test.  She had recent ankle issues and will not be able to exercise for an exercise stress test.    Dyslipidemia: Still elevated despite atorvastatin, will repeat 1, based on epic records, atorvastatin has been prescribed since September 2024.  She does have xanthelasma type lesions on her eyes.    Hypertension: Elevated, will increase clonidine to 0.3 mg twice daily, she is also on losartan 100 mg and furosemide which will be continued without changes.    Orthostatic symptoms: Orthostatics were positive in the office although started with a blood pressure of 190 systolic on lying down.  Will be repeated at her next visit.  In the interim, she will improve her hydration status to at least 60 ounces of fluid a day.  She has not new water bottle now that  she will start using.    Follow-up in about 3 months         Latest Reference Range & Units 03/09/23 09:00 01/29/24 04:35 07/10/24 12:28   Cholesterol See  Comment mg/dL 234 (H) 207 (H) 286 (H)   Triglycerides See Comment mg/dL 153 (H) 162 (H) 230 (H)   HDL >=50 mg/dL 71 48 (L) 71   Non-HDL Cholesterol mg/dl 163 159 215   LDL Calculated 0 - 100 mg/dL 132 (H) 127 (H) 169 (H)   (H): Data is abnormally high  (L): Data is abnormally low  Component 06/08/23 02/17/21 01/03/20 03/18/17   Cholesterol 206 High  265 201 272 High    Triglyceride 282 High  130 165 166 High    Cholesterol, HDL, Direct 56 74 62 43   Cholesterol, Non- 191 139 229 High     Cholesterol, LDL, Calculated 94  165 106 196 High         Latest Reference Range & Units 02/17/25 10:30   Sodium 135 - 147 mmol/L 139   Potassium 3.5 - 5.3 mmol/L 4.0   Chloride 96 - 108 mmol/L 106   Carbon Dioxide 21 - 32 mmol/L 26   ANION GAP 4 - 13 mmol/L 7   BUN 5 - 25 mg/dL 13   Creatinine 0.60 - 1.30 mg/dL 0.62      Latest Reference Range & Units 01/21/25 09:22 02/17/25 10:30   Hemoglobin 11.5 - 15.4 g/dL 13.8 13.0   Hematocrit 34.8 - 46.1 % 44.3 41.2      Latest Reference Range & Units 07/10/24 12:28 12/19/24 12:27   TSH 3RD GENERATON 0.450 - 4.500 uIU/mL 2.380 2.545   FREE T4 0.61 - 1.12 ng/dL 0.85       Latest Reference Range & Units 09/06/23 23:35 01/02/24 09:24   SARS-COV-2 Negative  Negative Positive !   !: Data is abnormal    1. Benign essential hypertension  NM myocardial perfusion spect (rx stress and/or rest)      2. Acute chest pain  Ambulatory Referral to Cardiology    NM myocardial perfusion spect (rx stress and/or rest)      3. Elevated blood pressure reading  Ambulatory Referral to Cardiology    NM myocardial perfusion spect (rx stress and/or rest)      4. History of DVT (deep vein thrombosis)        5. Mixed hyperlipidemia  NM myocardial perfusion spect (rx stress and/or rest)      6. BMI 45.0-49.9, adult (HCC)          Patient Active Problem List   Diagnosis    Yan's esophagus determined by biopsy    Benign essential hypertension    Schizoaffective disorder, bipolar type (HCC)    Chronic low back  "pain    Family history of renal cancer    Hypothyroidism    MAG (obstructive sleep apnea)    Overactive bladder    Major depressive disorder    Sjogren's syndrome ([facility])    COPD (chronic obstructive pulmonary disease) ([facility])    Mixed hyperlipidemia    BMI 45.0-49.9, adult ([facility])    Anticoagulation management encounter    Mood disorder ([facility])    Substance abuse ([facility])    Cognitive impairment    Anxiety    Borderline personality disorder ([facility])    Platelets decreased ([facility])    Chronic eczematous otitis externa of both ears    Chronic migraine without aura without status migrainosus, not intractable    Bilateral leg edema    Cervicalgia    History of pulmonary embolism    Restless leg syndrome    Lymphedema    History of DVT (deep vein thrombosis)    Dyskinesia, drug-induced    Cervical spondylosis    Prediabetes     Past Medical History:   Diagnosis Date    Acute deep vein thrombosis of lower leg, left ([facility]) 10/16/2023    Acute heart failure, unspecified heart failure type ([facility]) 05/06/2024    Anxiety     Arthritis     oa cassandra knees    Asthma     good control- no medications    Yan's esophagus     Bipolar affective disorder ([facility])     Cannabis abuse with unspecified 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[facility])     Chronic pain disorder     lumbar    Contusion of elbow 12/21/2021    COPD (chronic obstructive pulmonary disease) ([facility])     CPAP (continuous positive airway pressure) dependence     DDD (degenerative disc disease), lumbar 04/09/2015    Degenerative disc disease at L5-S1 level     Deliberate self-cutting     9/15/22per pt has not done recently-- does see a therapist and psychiatrist regularly    Depression 09/16/2008    Diarrhea 01/06/2022    Disease of thyroid gland     hypo    MARTINEZ (dyspnea on exertion)     per pt \"has had this with exertion and not new\"    Drug overdose 10/28/2008    suicide attempt and again drug overdose 7/2022-- [facility] floor and than transferred to [facility]    Dysphagia     Dyspnea     Ecchymosis " 01/06/2022    Edema     BLE    Elevated troponin 09/02/2023    Family history of blood clots     GERD (gastroesophageal reflux disease)     Grief 11/11/2023    Headache(784.0)     Headache, tension-type     Hemorrhage of rectum and anus 10/02/2017    Formatting of this note might be different from the original.  Added automatically from request for surgery [id number]    High blood pressure     History of COVID-19 12/30/2020    Symptoms started on 12/30/2020 and then got worse.  Today she is feeling a little bit better.  She is a candidate for monoclonal antibody infusion and set for 1/6/21 @ 1pm at [facility]. 01/07/21 - telemedicine -     Hyperlipidemia     Hypertension     Ingrown toenail 12/02/2023    Intentional overdose (HCC) 09/27/2023    Intentional self-harm by unspecified sharp object, sequela (HCC) 02/09/2023    Knee pain, bilateral     Especially right    Knee pain, right 02/23/2022    Medial epicondylitis of elbow, left 04/03/2018    Formatting of this note might be different from the original.  Added automatically from request for surgery [id number]    Medial epicondylitis of right elbow 07/17/2023    Medical marijuana use     Memory difficulties 08/06/2020    Memory loss     Migraines     Obesity     Other psychoactive substance abuse with unspecified psychoactive substance-induced disorder (HCC) 05/06/2024    Overactive bladder     Polysubstance abuse (HCC) 09/29/2023    Potential for cognitive impairment 06/17/2021    Primary osteoarthritis of both knees 08/08/2018    Pulmonary emboli (HCC)     Restrictive lung disease 02/01/2017    Last Assessment & Plan:   Formatting of this note might be different from the original.  I reviewed this problem throughout the rest of the note. Please refer to the other assessments for details.    Sjogren's disease (HCC)     Sleep apnea     Stress incontinence     Suicidal deliberate poisoning (HCC) 07/13/2022    Suicidal ideations     Teeth missing     Toxic  encephalopathy 2023    Tremor     per pt Tremors of Right Leg and both Arms    Visual impairment     Wears glasses      Social History     Socioeconomic History    Marital status: Single     Spouse name: Not on file    Number of children: Not on file    Years of education: Not on file    Highest education level: Not on file   Occupational History    Not on file   Tobacco Use    Smoking status: Former     Current packs/day: 0.00     Average packs/day: 2.0 packs/day for 33.0 years (66.0 ttl pk-yrs)     Types: Cigarettes     Start date: 1985     Quit date: 2018     Years since quittin.1     Passive exposure: Current    Smokeless tobacco: Never    Tobacco comments:     Started at age 16.   Vaping Use    Vaping status: Some Days    Substances: THC   Substance and Sexual Activity    Alcohol use: Not Currently     Comment: Recovering alcoholic    Drug use: Yes     Types: Marijuana     Comment: Former meth use, medicinal marijuana    Sexual activity: Not Currently     Partners: Male     Comment: defer   Other Topics Concern    Not on file   Social History Narrative    Not on file     Social Drivers of Health     Financial Resource Strain: Low Risk  (2024)    Overall Financial Resource Strain (CARDIA)     Difficulty of Paying Living Expenses: Not hard at all   Food Insecurity: No Food Insecurity (2024)    Nursing - Inadequate Food Risk Classification     Worried About Running Out of Food in the Last Year: Never true     Ran Out of Food in the Last Year: Never true     Ran Out of Food in the Last Year: Not on file   Transportation Needs: No Transportation Needs (2024)    PRAPARE - Transportation     Lack of Transportation (Medical): No     Lack of Transportation (Non-Medical): No   Physical Activity: Not on file   Stress: Not on file   Social Connections: Not on file   Intimate Partner Violence: Not on file   Housing Stability: Unknown (2024)    Housing Stability Vital Sign     Unable to Pay  for Housing in the Last Year: No     Number of Times Moved in the Last Year: Not on file     Homeless in the Last Year: No      Family History   Problem Relation Age of Onset    Kidney cancer Mother     Diabetes Mother     Depression Mother     Stroke Mother     Arthritis Mother     Cancer Mother     Psychiatric Illness Mother     No Known Problems Father     No Known Problems Sister     No Known Problems Maternal Grandmother     No Known Problems Maternal Grandfather     No Known Problems Paternal Grandmother     No Known Problems Paternal Grandfather     Colon cancer Maternal Uncle     Colon cancer Maternal Uncle     Colon cancer Paternal Uncle     Colon cancer Family     Alcohol abuse Sister     Asthma Sister      Past Surgical History:   Procedure Laterality Date    BREAST CYST EXCISION Right 1989    CARPAL TUNNEL RELEASE Left     CHOLECYSTECTOMY  05/2003    Laparoscopic    COLONOSCOPY      01/12/2009    DILATION AND CURETTAGE OF UTERUS      ELBOW SURGERY Left     x2. No hardware    ESOPHAGOGASTRODUODENOSCOPY      FOOT SURGERY Left     Plantar fasciotomy    HERNIA REPAIR      HYSTERECTOMY      laporoscopic, partial    KNEE ARTHROSCOPY Bilateral     OOPHORECTOMY Left 10/2015    CA CORRJ HLX VLGS BNCTY SESMDC JOINT ARTHRODESIS Right 8/2/2024    Procedure: RIGHT FOOT LAPIDUS BUNIONECTOMY, 2ND HAMMERTOE REPAIR, SECOND METATARSAL OSTEOTOMY WITH SECOND PLANTAR PLATE REPAIR;  Surgeon: Kaz Bautista DPM;  Location: [facility] MAIN OR;  Service: Podiatry    CA GASTROCNEMIUS RECESSION Left 02/24/2021    Procedure: RECESSION GASTROC OPEN;  Surgeon: Nomi Yang DPM;  Location:  MAIN OR;  Service: Podiatry    CA RPR UMBILICAL HRNA 5 YRS/> REDUCIBLE N/A 04/24/2019    Procedure: REPAIR HERNIA UMBILICAL LAPAROSCOPIC W/ ROBOTICS;  Surgeon: Anahi Colon MD;  Location: [facility] Main OR;  Service: General    CA SPHINCTEROTOMY ANAL DIVISION SPHINCTER SPX N/A 08/31/2018    Procedure: EUA, LEFT PARTIAL INTERNAL SPHINCTEROTOMY;   Surgeon: Tien Reyes MD;  Location:  MAIN OR;  Service: Colorectal    MN TNOT ELBOW LATERAL/MEDIAL DEBRIDE OPEN TDN RPR Left 09/20/2022    Procedure: RELEASE EPICONDYLAR ELBOW MEDIAL;  Surgeon: Kyree Winkler MD;  Location[facility];  Service: Orthopedics    REDUCTION MAMMAPLASTY Bilateral 1999    REPAIR LACERATION Left     left hand-5/18/2009    REPLACEMENT TOTAL KNEE Right     ROTATOR CUFF REPAIR Left     TONSILECTOMY AND ADNOIDECTOMY      US GUIDED MSK PROCEDURE  04/22/2021    VASCULAR SURGERY      VEIN LIGATION Bilateral     WISDOM TOOTH EXTRACTION         Current Outpatient Medications:     Aciphex 20 MG tablet, Take by mouth 2 (two) times a day, Disp: , Rfl:     atoMOXetine (STRATTERA) 60 mg capsule, Take 60 mg by mouth daily, Disp: , Rfl:     atorvastatin (LIPITOR) 10 mg tablet, Take 1 tablet (10 mg total) by mouth daily, Disp: 90 tablet, Rfl: 2    Austedo XR 24 MG TB24, , Disp: , Rfl:     benztropine (COGENTIN) 1 mg tablet, , Disp: , Rfl:     buPROPion (WELLBUTRIN XL) 150 mg 24 hr tablet, , Disp: , Rfl:     Cholecalciferol (Vitamin D3) 125 MCG (5000 UT) capsule, Take 1 capsule (5,000 Units total) by mouth daily, Disp: 90 capsule, Rfl: 1    cloNIDine (CATAPRES) 0.2 mg tablet, TAKE 1 TABLET BY MOUTH EVERY 12 HOURS, Disp: 180 tablet, Rfl: 1    cyanocobalamin (VITAMIN B-12) 1000 MCG tablet, Take 1 tablet (1,000 mcg total) by mouth daily, Disp: 90 tablet, Rfl: 1    furosemide (LASIX) 20 mg tablet, Take 1 tablet (20 mg total) by mouth daily, Disp: 90 tablet, Rfl: 1    levocetirizine (XYZAL) 5 MG tablet, TAKE ONE TABLET BY MOUTH IN THE EVENING DAILY, Disp: 90 tablet, Rfl: 1    levothyroxine (Euthyrox) 125 mcg tablet, Take 1 tablet (125 mcg total) by mouth daily in the early morning, Disp: 90 tablet, Rfl: 1    losartan (COZAAR) 100 MG tablet, Take 1 tablet (100 mg total) by mouth daily, Disp: 90 tablet, Rfl: 1    lurasidone (LATUDA) 40 mg tablet, , Disp: , Rfl:     metFORMIN (GLUCOPHAGE-XR) 750 mg 24 hr  tablet, Take 1 tablet (750 mg total) by mouth daily with breakfast, Disp: 90 tablet, Rfl: 1    naltrexone (REVIA) 50 mg tablet, TAKE 1 TABLET BY MOUTH DAILY, Disp: 90 tablet, Rfl: 4    naproxen (Naprosyn) 500 mg tablet, Take 1 tablet (500 mg total) by mouth 2 (two) times a day with meals Take with food., Disp: 30 tablet, Rfl: 0    OXcarbazepine (TRILEPTAL) 150 mg tablet, Take 3 tablets (450 mg total) by mouth every 12 (twelve) hours, Disp: 180 tablet, Rfl: 1    OXcarbazepine (TRILEPTAL) 300 mg tablet, Take 300 mg by mouth every 12 (twelve) hours, Disp: , Rfl:     oxybutynin (DITROPAN) 5 mg tablet, Take 1 tablet (5 mg total) by mouth 2 (two) times a day, Disp: 120 tablet, Rfl: 1    pantoprazole (PROTONIX) 40 mg tablet, Take 1 tablet (40 mg total) by mouth daily in the early morning, Disp: 90 tablet, Rfl: 1    pilocarpine (SALAGEN) 5 mg tablet, TAKE 1 TABLET (5 MG TOTAL) BY MOUTH THREE (THREE) TIMES A DAY, Disp: 270 tablet, Rfl: 4    rOPINIRole (REQUIP) 1 mg tablet, Take by mouth daily, Disp: , Rfl:     topiramate (TOPAMAX) 200 MG tablet, Take 1 tablet (200 mg total) by mouth 2 (two) times a day, Disp: 60 tablet, Rfl: 1    traZODone (DESYREL) 100 mg tablet, , Disp: , Rfl:     trospium chloride (SANCTURA) 20 mg tablet, Take 20 mg by mouth 2 (two) times a day, Disp: , Rfl:     warfarin (COUMADIN) 2 mg tablet, take 3 & 1/2 tablets (7MG) once a day Saturday and Sunday and take 2 & 1/2 tablets (5MG) once a day Monday through Friday (Patient taking differently: 5 mgs 6 days  a week and 2 mg 1 day), Disp: 90 tablet, Rfl: 0    enoxaparin (LOVENOX) 150 mg/mL injection, Inject 0.9 mL (135 mg total) under the skin every 12 (twelve) hours for 10 days Start with your coumadin post surgery as discussed (Patient not taking: Reported on 2/20/2025), Disp: 20 mL, Rfl: 0    Invega Trinza 819 MG/2.63ML DWIGHT, , Disp: , Rfl:     nitrofurantoin (MACROBID) 100 mg capsule, , Disp: , Rfl:     warfarin (COUMADIN) 5 mg tablet, Take 1 tablet (5  "mg total) by mouth daily (Patient not taking: Reported on 2/20/2025), Disp: 90 tablet, Rfl: 1  No current facility-administered medications for this visit.  Allergies   Allergen Reactions    Percocet [Oxycodone-Acetaminophen] Headache     Severe headaches   (denies issues with Tylenol)    Povidone Iodine Rash     Unsure if betadine or gauze post surgical. Got rash on leg.   Has  Had itchy rashes after every surgery prep and IV insertion    Chlorhexidine Rash     Per pt \"able to use the liquid soap--thinkd reaction from the sponges or wipes\"     Vitals:    02/20/25 0945   BP: 150/90   BP Location: Left arm   Patient Position: Sitting   Cuff Size: Standard   Pulse: 81   SpO2: 98%   Weight: 136 kg (299 lb 3.2 oz)   Height: 5' 6\" (1.676 m)         Imaging: FL spine and pain procedure  Result Date: 2/19/2025  Narrative: Radiofrequency Denervation of Cervical Facet Joints Indication: Mechanical neck pain Preoperative diagnosis: 1. Cervical spondylosis without myelopathy 2. Cervicalgia Postoperative diagnosis: 1. Cervical spondylosis without myelopathy 2. Cervicalgia Procedure: Fluoroscopically-guided Radiofrequency denervation of the left C4-5 and C5-6 facet joint(s) After discussing the risks, benefits, and alternatives to the procedure, the patient expressed understanding and wished to proceed. The patient was brought to the fluoroscopy suite and placed in the prone position. Procedural pause conducted to verify: correct patient identity, procedure to be performed and as applicable, correct side and site, correct patient position, and availability of implants, special equipment and special requirements. Using fluoroscopy, the mid-body of the articular pillar at the left C4, C5, and C6 levels were identified and marked. The skin was sterilely prepped and draped in the usual fashion using Chloraprep skin prep. The skin and subcutaneous tissue were anesthetized with 1% lidocaine. Using fluoroscopic guidance, a 100 mm 20 " gauge RFK RF cannula with a 10 mm active tip was advanced to each target. This was confirmed using PA, lateral and oblique fluoroscopic views. Motor testing was performed at 2Hz up to 2.5 volts, and measured tissue impedances were satisfactory. With final needle positioning, there was no evidence of radicular stimulation. Prior to lesioning, 1cc of 2% lidocaine was injected at each site. After waiting 2 minutes for local anesthesia to take effect, lesioning was performed at 90 degrees Celsius for 90 seconds. A slight repositioning of the needles was performed an lesioning was again performed at 90 degreees Celsius for 90 seconds. After RF treatment, each site was injected using 1cc of 0.5% bupivacaine. The patient tolerated the procedure well and there were no apparent complications. After appropriate observation, the patient was dismissed from the clinic in good condition under their own power. EBL: Minimal Specimen: None     XR chest 1 view portable  Result Date: 2/17/2025  Narrative: CHEST INDICATION: Chest pain. COMPARISON: Several prior chest radiographs, the most recent from 1/21/2025. TECHNIQUE: XR CHEST PORTABLE FINDINGS: The lungs are clear. No pleural effusions. No evidence of pneumothorax. Cardiac silhouette not accurately accessed on this projection.     Impression: No acute pulmonary pathology. Findings are concordant with preliminary interpretation provided in the Emergency Department. Workstation performed: [id number]     US bedside procedure  Result Date: 2/17/2025  Narrative: [id number]      Review of Systems:  Review of Systems   Constitutional: Negative.    HENT: Negative.     Eyes: Negative.    Respiratory:  Positive for chest tightness. Negative for apnea, cough, choking, shortness of breath, wheezing and stridor.    Cardiovascular:  Positive for chest pain. Negative for palpitations and leg swelling.   Musculoskeletal: Negative.    Skin: Negative.        Physical  "Exam:    /90 (BP Location: Left arm, Patient Position: Sitting, Cuff Size: Standard)   Pulse 81   Ht 5' 6\" (1.676 m)   Wt 136 kg (299 lb 3.2 oz)   SpO2 98%   BMI 48.29 kg/m²   Physical Exam  Constitutional:       General: She is not in acute distress.     Appearance: She is not ill-appearing, toxic-appearing or diaphoretic.   HENT:      Head: Normocephalic and atraumatic.   Neck:      Thyroid: No thyromegaly.      Vascular: No hepatojugular reflux or JVD.   Pulmonary:      Effort: Pulmonary effort is normal. No tachypnea, bradypnea, accessory muscle usage or respiratory distress.      Breath sounds: No decreased breath sounds, wheezing, rhonchi or rales.   Abdominal:      Palpations: Abdomen is soft. There is no hepatomegaly, splenomegaly or mass.      Tenderness: There is no abdominal tenderness.   Musculoskeletal:      Cervical back: Normal range of motion.      Right lower leg: No tenderness. No edema.      Left lower leg: No tenderness. No edema.   Lymphadenopathy:      Cervical: No cervical adenopathy.   Neurological:      Mental Status: She is alert.            This note was completed in part utilizing [employer] direct voice recognition software.   Grammatical errors, random word insertion, spelling mistakes, occasional wrong word or \"sound-alike\" substitutions and incomplete sentences may be an occasional consequence of the system secondary to software limitations, ambient noise and hardware issues. At the time of dictation, efforts were made to edit, clarify and /or correct errors.  Please read the chart carefully and recognize, using context, where substitutions have occurred.  If you have any questions or concerns about the context, text or information contained within the body of this dictation, please contact myself, the provider, for further clarification.    "

## 2025-02-21 ENCOUNTER — TELEPHONE (OUTPATIENT)
Dept: HEMATOLOGY ONCOLOGY | Facility: CLINIC | Age: 55
End: 2025-02-21

## 2025-02-21 NOTE — TELEPHONE ENCOUNTER
S/w the patient and she stated she was a little sore yesterday but feels better today. She took the band aids off and everything seems ok. Reviewed the postoperative instructions with her and she appreciated the call.  Please scheduled a 6 week F/U. Thanks

## 2025-02-21 NOTE — TELEPHONE ENCOUNTER
Caller: Patient    Doctor: Dr. Sepulveda    Reason for call: Patient returning call to nurse    Call back#: [phone]

## 2025-02-21 NOTE — TELEPHONE ENCOUNTER
Inform patient her INR was 1.9, to continue current dose.  We will recheck it next week.  She voiced understanding.

## 2025-02-26 ENCOUNTER — PROCEDURE VISIT (OUTPATIENT)
Dept: NEUROLOGY | Facility: CLINIC | Age: 55
End: 2025-02-26
Payer: COMMERCIAL

## 2025-02-26 ENCOUNTER — TELEPHONE (OUTPATIENT)
Age: 55
End: 2025-02-26

## 2025-02-26 VITALS — TEMPERATURE: 97.8 F | SYSTOLIC BLOOD PRESSURE: 160 MMHG | DIASTOLIC BLOOD PRESSURE: 100 MMHG

## 2025-02-26 DIAGNOSIS — G43.709 CHRONIC MIGRAINE WITHOUT AURA WITHOUT STATUS MIGRAINOSUS, NOT INTRACTABLE: Primary | ICD-10-CM

## 2025-02-26 PROCEDURE — 64615 CHEMODENERV MUSC MIGRAINE: CPT | Performed by: PHYSICIAN ASSISTANT

## 2025-02-26 NOTE — PROGRESS NOTES
"Universal Protocol   procedure performed by consultantConsent: Verbal consent obtained. Written consent obtained.  Risks and benefits: risks, benefits and alternatives were discussed  Consent given by: patient  Time out: Immediately prior to procedure a \"time out\" was called to verify the correct patient, procedure, equipment, support staff and site/side marked as required.  Patient understanding: patient states understanding of the procedure being performed  Patient consent: the patient's understanding of the procedure matches consent given  Procedure consent: procedure consent matches procedure scheduled  Relevant documents: relevant documents present and verified  Patient identity confirmed: verbally with patient      Chemodenervation     Date/Time  2/26/2025 8:30 AM     Performed by  Aria Toney PA-C   Authorized by  Aria Toney PA-C     Pre-procedure details      Preparation: Patient was prepped and draped in usual sterile fashion     Anesthesia  (see MAR for exact dosages):     Anesthesia method:  None   Procedure details      Position:  Upright   Botox      Botox Type:  Type A    Brand:  Botox    mL's of Botulinum Toxin:  200    mL's of preservative free sterile saline:  4    Final Concentration per CC:  50 units    Needle Gauge:  30 G 2.5 inch   Procedures      Botox Procedures: chronic headache     Injection Location      Head / Face:  L superior cervical paraspinal, R superior cervical paraspinal, L , R , R frontalis, L frontalis, R medial occipitalis, L medial occipitalis, procerus, R temporalis, L superior trapezius, R superior trapezius and L temporalis    L  injection amount:  5 unit(s)    R  injection amount:  5 unit(s)    L lateral frontalis:  5 unit(s)    R lateral frontalis:  5 unit(s)    L medial frontalis:  5 unit(s)    R medial frontalis:  5 unit(s)    L temporalis injection amount:  20 unit(s)    R temporalis injection amount:  20 unit(s)    Procerus " injection amount:  5 unit(s)    L medial occipitalis injection amount:  15 unit(s)    R medial occipitalis injection amount:  15 unit(s)    L superior cervical paraspinal injection amount:  10 unit(s)    R superior cervical paraspinal injection amount:  10 unit(s)    L superior trapezius injection amount:  15 unit(s)    R superior trapezius injection amount:  15 unit(s)   Total Units      Total units used:  200   Post-procedure details      Chemodenervation:  Chronic migraine    Facial Nerve Location::  Bilateral facial nerve    Patient tolerance of procedure:  Tolerated well, no immediate complications   Comments       10 units scm bilaterally  10 units occipitalis  15 units left splenius  All medically necessary

## 2025-03-03 ENCOUNTER — APPOINTMENT (OUTPATIENT)
Dept: LAB | Facility: CLINIC | Age: 55
End: 2025-03-03
Payer: COMMERCIAL

## 2025-03-03 ENCOUNTER — TELEPHONE (OUTPATIENT)
Dept: HEMATOLOGY ONCOLOGY | Facility: CLINIC | Age: 55
End: 2025-03-03

## 2025-03-03 DIAGNOSIS — I10 HYPERTENSION: ICD-10-CM

## 2025-03-03 DIAGNOSIS — E03.9 ACQUIRED HYPOTHYROIDISM: Chronic | ICD-10-CM

## 2025-03-03 DIAGNOSIS — R73.03 PREDIABETES: ICD-10-CM

## 2025-03-03 DIAGNOSIS — E78.2 MIXED HYPERLIPIDEMIA: ICD-10-CM

## 2025-03-03 DIAGNOSIS — Z86.711 HISTORY OF PULMONARY EMBOLISM: ICD-10-CM

## 2025-03-03 LAB
25(OH)D3 SERPL-MCNC: 34.8 NG/ML (ref 30–100)
ALBUMIN SERPL BCG-MCNC: 3.8 G/DL (ref 3.5–5)
ALP SERPL-CCNC: 99 U/L (ref 34–104)
ALT SERPL W P-5'-P-CCNC: 19 U/L (ref 7–52)
ANION GAP SERPL CALCULATED.3IONS-SCNC: 13 MMOL/L (ref 4–13)
AST SERPL W P-5'-P-CCNC: 15 U/L (ref 13–39)
BASOPHILS # BLD AUTO: 0.04 THOUSANDS/ÂΜL (ref 0–0.1)
BASOPHILS NFR BLD AUTO: 0 % (ref 0–1)
BILIRUB SERPL-MCNC: 0.24 MG/DL (ref 0.2–1)
BUN SERPL-MCNC: 10 MG/DL (ref 5–25)
CALCIUM SERPL-MCNC: 9.4 MG/DL (ref 8.4–10.2)
CHLORIDE SERPL-SCNC: 105 MMOL/L (ref 96–108)
CHOLEST SERPL-MCNC: 218 MG/DL (ref ?–200)
CO2 SERPL-SCNC: 22 MMOL/L (ref 21–32)
CREAT SERPL-MCNC: 0.61 MG/DL (ref 0.6–1.3)
EOSINOPHIL # BLD AUTO: 0.09 THOUSAND/ÂΜL (ref 0–0.61)
EOSINOPHIL NFR BLD AUTO: 1 % (ref 0–6)
ERYTHROCYTE [DISTWIDTH] IN BLOOD BY AUTOMATED COUNT: 15.2 % (ref 11.6–15.1)
EST. AVERAGE GLUCOSE BLD GHB EST-MCNC: 126 MG/DL
GFR SERPL CREATININE-BSD FRML MDRD: 103 ML/MIN/1.73SQ M
GLUCOSE P FAST SERPL-MCNC: 101 MG/DL (ref 65–99)
HBA1C MFR BLD: 6 %
HCT VFR BLD AUTO: 44.2 % (ref 34.8–46.1)
HDLC SERPL-MCNC: 59 MG/DL
HGB BLD-MCNC: 13.6 G/DL (ref 11.5–15.4)
IMM GRANULOCYTES # BLD AUTO: 0.12 THOUSAND/UL (ref 0–0.2)
IMM GRANULOCYTES NFR BLD AUTO: 1 % (ref 0–2)
INR PPP: 2.06 (ref 0.85–1.19)
LDLC SERPL CALC-MCNC: 119 MG/DL (ref 0–100)
LYMPHOCYTES # BLD AUTO: 2.63 THOUSANDS/ÂΜL (ref 0.6–4.47)
LYMPHOCYTES NFR BLD AUTO: 23 % (ref 14–44)
MCH RBC QN AUTO: 28.2 PG (ref 26.8–34.3)
MCHC RBC AUTO-ENTMCNC: 30.8 G/DL (ref 31.4–37.4)
MCV RBC AUTO: 92 FL (ref 82–98)
MONOCYTES # BLD AUTO: 0.82 THOUSAND/ÂΜL (ref 0.17–1.22)
MONOCYTES NFR BLD AUTO: 7 % (ref 4–12)
NEUTROPHILS # BLD AUTO: 7.8 THOUSANDS/ÂΜL (ref 1.85–7.62)
NEUTS SEG NFR BLD AUTO: 68 % (ref 43–75)
NRBC BLD AUTO-RTO: 0 /100 WBCS
PLATELET # BLD AUTO: 194 THOUSANDS/UL (ref 149–390)
PMV BLD AUTO: 10.9 FL (ref 8.9–12.7)
POTASSIUM SERPL-SCNC: 3.9 MMOL/L (ref 3.5–5.3)
PROT SERPL-MCNC: 7.2 G/DL (ref 6.4–8.4)
PROTHROMBIN TIME: 23.3 SECONDS (ref 12.3–15)
RBC # BLD AUTO: 4.82 MILLION/UL (ref 3.81–5.12)
SODIUM SERPL-SCNC: 140 MMOL/L (ref 135–147)
T4 FREE SERPL-MCNC: 0.63 NG/DL (ref 0.61–1.12)
TRIGL SERPL-MCNC: 198 MG/DL (ref ?–150)
TSH SERPL DL<=0.05 MIU/L-ACNC: 10.48 UIU/ML (ref 0.45–4.5)
WBC # BLD AUTO: 11.5 THOUSAND/UL (ref 4.31–10.16)

## 2025-03-03 PROCEDURE — 83036 HEMOGLOBIN GLYCOSYLATED A1C: CPT

## 2025-03-03 PROCEDURE — 82306 VITAMIN D 25 HYDROXY: CPT

## 2025-03-03 PROCEDURE — 36415 COLL VENOUS BLD VENIPUNCTURE: CPT

## 2025-03-03 PROCEDURE — 84443 ASSAY THYROID STIM HORMONE: CPT

## 2025-03-03 PROCEDURE — 80183 DRUG SCRN QUANT OXCARBAZEPIN: CPT

## 2025-03-03 PROCEDURE — 80061 LIPID PANEL: CPT

## 2025-03-03 PROCEDURE — 80053 COMPREHEN METABOLIC PANEL: CPT

## 2025-03-03 PROCEDURE — 85610 PROTHROMBIN TIME: CPT

## 2025-03-03 PROCEDURE — 84439 ASSAY OF FREE THYROXINE: CPT

## 2025-03-03 PROCEDURE — 85025 COMPLETE CBC W/AUTO DIFF WBC: CPT

## 2025-03-03 NOTE — TELEPHONE ENCOUNTER
Spoke with Blanca, her INR is 2.06.  Recommend she continue the same dose of Coumadin 5 mg Monday through Friday and Coumadin 2 mg Saturday and Sunday.  Recheck her INR next Monday.  Patient voiced understanding.

## 2025-03-04 ENCOUNTER — RESULTS FOLLOW-UP (OUTPATIENT)
Dept: NON INVASIVE DIAGNOSTICS | Facility: HOSPITAL | Age: 55
End: 2025-03-04

## 2025-03-04 DIAGNOSIS — E78.2 MIXED HYPERLIPIDEMIA: ICD-10-CM

## 2025-03-04 DIAGNOSIS — E78.2 MIXED DYSLIPIDEMIA: Primary | ICD-10-CM

## 2025-03-04 DIAGNOSIS — I10 BENIGN ESSENTIAL HYPERTENSION: Chronic | ICD-10-CM

## 2025-03-04 RX ORDER — ATORVASTATIN CALCIUM 80 MG/1
80 TABLET, FILM COATED ORAL DAILY
Qty: 90 TABLET | Refills: 3 | Status: SHIPPED | OUTPATIENT
Start: 2025-03-04

## 2025-03-04 NOTE — TELEPHONE ENCOUNTER
----- Message from Heron Prabhakar MD sent at 3/4/2025  9:02 AM EST -----  Still elevated, can do much better, will increase her cholesterol medication dose to 80 mg and recheck in 3 months, please let her know  Would aim for a goal non-HDL closer to 100-1 30, goal direct LDL close to .

## 2025-03-04 NOTE — ED NOTES
[facility] Social Work Progress Notes       You have successfully submitted the preadmission screening (PAS) to the Senior [facility] Line on:  Created On  3/4/2025 1:15 PM    Your confirmation number is:  [id number]    Results  Level of Care:    Based on the information you provided, it appears this person meets level of care for purposes of MA payment.   OBRA:  Before this person admits to a nursing facility, an OBRA Level II assessment for mental illness is required.      [facility] Acute Inpatient Rehab Unit   [address] [place] [address], [address] [place] [address]  Phone: [phone]  Fax: [phone]     Patient is lying in bed sleeping  Not in distress  Lights off, tv off  Will continue to monitor patient       Rachel Campbell  08/29/21 7051

## 2025-03-05 ENCOUNTER — HOSPITAL ENCOUNTER (OUTPATIENT)
Dept: NON INVASIVE DIAGNOSTICS | Facility: CLINIC | Age: 55
Discharge: HOME | End: 2025-03-05
Payer: COMMERCIAL

## 2025-03-05 DIAGNOSIS — R03.0 ELEVATED BLOOD PRESSURE READING: ICD-10-CM

## 2025-03-05 DIAGNOSIS — I10 BENIGN ESSENTIAL HYPERTENSION: Chronic | ICD-10-CM

## 2025-03-05 DIAGNOSIS — E78.2 MIXED HYPERLIPIDEMIA: ICD-10-CM

## 2025-03-05 DIAGNOSIS — R07.9 ACUTE CHEST PAIN: ICD-10-CM

## 2025-03-05 LAB
RATE PRESSURE PRODUCT: 176
SL CV REST NUCLEAR ISOTOPE DOSE: 16.5 MCI
SL CV STRESS NUCLEAR ISOTOPE DOSE: 47.3 MCI
SL CV STRESS RECOVERY BP: NORMAL MMHG
SL CV STRESS RECOVERY HR: 81 BPM
SL CV STRESS RECOVERY O2 SAT: 98 %
SPECT HRT GATED+EF W RNC IV: 68 %
STRESS ANGINA INDEX: 0
STRESS BASELINE BP: NORMAL MMHG
STRESS BASELINE HR: 77 BPM
STRESS O2 SAT REST: 96 %
STRESS PEAK HR: 88 BPM
STRESS POST O2 SAT PEAK: 98 %
STRESS POST PEAK BP: 2 MMHG
STRESS/REST PERFUSION RATIO: 1.13

## 2025-03-05 PROCEDURE — 93016 CV STRESS TEST SUPVJ ONLY: CPT | Performed by: INTERNAL MEDICINE

## 2025-03-05 PROCEDURE — 93017 CV STRESS TEST TRACING ONLY: CPT

## 2025-03-05 PROCEDURE — 78452 HT MUSCLE IMAGE SPECT MULT: CPT | Performed by: INTERNAL MEDICINE

## 2025-03-05 PROCEDURE — A9502 TC99M TETROFOSMIN: HCPCS

## 2025-03-05 PROCEDURE — 93018 CV STRESS TEST I&R ONLY: CPT | Performed by: INTERNAL MEDICINE

## 2025-03-05 PROCEDURE — 78452 HT MUSCLE IMAGE SPECT MULT: CPT

## 2025-03-05 RX ORDER — LEVOTHYROXINE SODIUM 125 UG/1
125 TABLET ORAL
Qty: 90 TABLET | Refills: 1 | Status: SHIPPED | OUTPATIENT
Start: 2025-03-05

## 2025-03-05 RX ORDER — METFORMIN HYDROCHLORIDE 750 MG/1
750 TABLET, EXTENDED RELEASE ORAL
Qty: 90 TABLET | Refills: 1 | Status: SHIPPED | OUTPATIENT
Start: 2025-03-05

## 2025-03-05 RX ORDER — REGADENOSON 0.08 MG/ML
0.4 INJECTION, SOLUTION INTRAVENOUS ONCE
Status: COMPLETED | OUTPATIENT
Start: 2025-03-05 | End: 2025-03-05

## 2025-03-05 RX ORDER — FUROSEMIDE 20 MG/1
20 TABLET ORAL DAILY
Qty: 90 TABLET | Refills: 1 | Status: SHIPPED | OUTPATIENT
Start: 2025-03-05

## 2025-03-05 RX ORDER — LOSARTAN POTASSIUM 100 MG/1
100 TABLET ORAL DAILY
Qty: 90 TABLET | Refills: 4 | Status: SHIPPED | OUTPATIENT
Start: 2025-03-05

## 2025-03-05 RX ADMIN — REGADENOSON 0.4 MG: 0.08 INJECTION, SOLUTION INTRAVENOUS at 14:08

## 2025-03-06 LAB
CHEST PAIN STATEMENT: NORMAL
MAX DIASTOLIC BP: 78 MMHG
MAX PREDICTED HEART RATE: 167 BPM
PROTOCOL NAME: NORMAL
REASON FOR TERMINATION: NORMAL
STRESS POST EXERCISE DUR MIN: 3 MIN
STRESS POST EXERCISE DUR SEC: 0 SEC
STRESS POST PEAK HR: 89 BPM
STRESS POST PEAK SYSTOLIC BP: 148 MMHG
TARGET HR FORMULA: NORMAL
TEST INDICATION: NORMAL

## 2025-03-07 LAB — OXCARBAZEPINE SERPL-MCNC: 10 UG/ML (ref 10–35)

## 2025-03-10 ENCOUNTER — TELEPHONE (OUTPATIENT)
Age: 55
End: 2025-03-10

## 2025-03-10 NOTE — TELEPHONE ENCOUNTER
Caller: Patient    Doctor: Dr. BARROW    Reason for call: Would like to schedule a procedure for neck pain    Call back#:

## 2025-03-10 NOTE — TELEPHONE ENCOUNTER
S/w the patient to clarify. She is S/P a Left RFA on 2/19/25.  She stated she never received a postop phone call. She is really doing great she stated. Minimal pain. Everything went fine.  Reviewed that the clerical staff will call her tomorrow and schedule a 6 week f/u. She appreciated the call.

## 2025-03-11 ENCOUNTER — APPOINTMENT (OUTPATIENT)
Dept: LAB | Facility: CLINIC | Age: 55
End: 2025-03-11
Payer: COMMERCIAL

## 2025-03-11 DIAGNOSIS — Z86.711 HISTORY OF PULMONARY EMBOLISM: ICD-10-CM

## 2025-03-11 LAB
INR PPP: 2.62 (ref 0.85–1.19)
PROTHROMBIN TIME: 27.8 SECONDS (ref 12.3–15)

## 2025-03-11 PROCEDURE — 36415 COLL VENOUS BLD VENIPUNCTURE: CPT

## 2025-03-11 PROCEDURE — 85610 PROTHROMBIN TIME: CPT

## 2025-03-12 ENCOUNTER — OFFICE VISIT (OUTPATIENT)
Dept: FAMILY MEDICINE CLINIC | Facility: CLINIC | Age: 55
End: 2025-03-12
Payer: COMMERCIAL

## 2025-03-12 ENCOUNTER — TELEPHONE (OUTPATIENT)
Dept: HEMATOLOGY ONCOLOGY | Facility: CLINIC | Age: 55
End: 2025-03-12

## 2025-03-12 VITALS
BODY MASS INDEX: 47.09 KG/M2 | WEIGHT: 293 LBS | HEART RATE: 93 BPM | SYSTOLIC BLOOD PRESSURE: 154 MMHG | HEIGHT: 66 IN | DIASTOLIC BLOOD PRESSURE: 88 MMHG | TEMPERATURE: 98 F | OXYGEN SATURATION: 94 %

## 2025-03-12 DIAGNOSIS — F19.10 SUBSTANCE ABUSE (HCC): ICD-10-CM

## 2025-03-12 DIAGNOSIS — H02.60 XANTHELASMA: ICD-10-CM

## 2025-03-12 DIAGNOSIS — J06.9 UPPER RESPIRATORY TRACT INFECTION, UNSPECIFIED TYPE: Primary | ICD-10-CM

## 2025-03-12 DIAGNOSIS — J44.9 CHRONIC OBSTRUCTIVE PULMONARY DISEASE, UNSPECIFIED COPD TYPE (HCC): ICD-10-CM

## 2025-03-12 PROCEDURE — 99214 OFFICE O/P EST MOD 30 MIN: CPT

## 2025-03-12 PROCEDURE — 87636 SARSCOV2 & INF A&B AMP PRB: CPT

## 2025-03-12 RX ORDER — BENZONATATE 100 MG/1
100 CAPSULE ORAL 3 TIMES DAILY PRN
Qty: 20 CAPSULE | Refills: 0 | Status: SHIPPED | OUTPATIENT
Start: 2025-03-12

## 2025-03-12 NOTE — LETTER
March 14, 2025     Patient: Blanca Mason  YOB: 1970  Date of Visit: 3/12/2025      To Whom it May Concern:    Blanca Mason is under my professional care. Blanca was seen in my office on 3/12/2025. Blanca may return to work on 03/18/25 . Please excuse Blanca on 03/11/2025 and 03/14/2025    If you have any questions or concerns, please don't hesitate to call.         Sincerely,          JHONATAN Armando        CC: No Recipients

## 2025-03-12 NOTE — TELEPHONE ENCOUNTER
LVM for patient informing her that her INR is therapeutic.  Continue same dose of Coumadin, 5 mg Mondays through Fridays and 2mg Saturdays and Sundays.  Recheck INR next week.  Advised patient to confirm she received this message by calling me back.  Provided HOPE line number.

## 2025-03-12 NOTE — PROGRESS NOTES
[address] [facility]  Progress Note  Name: Kasandra Samuel  MRN: [id number]  Unit/Bed#: -01 I Date of Admission: 11/6/2023   Date of Service: 11/10/2023 I Hospital Day: 3    Assessment/Plan   * Intentional overdose [facility])  Assessment & Plan  Presented after intentional drug overdose, admitted to taking 40 tablets of 10 mg Flexeril. Hx schizoaffective disorder. Toxicology evaluated in ED, admitted for monitoring for seizures, anticholinergic adverse effects  Medically has been stable, no events noted on telemetry with nl QTc/QRS on EKGs  Psychiatry evaluated, inpatient psychiatric commitment indicated with 302 signed  Continue Wellbutrin, Strattera, Latuda, Topamax, Depakote, lorazepam PRN  Continue 1:1 observation, high risk for elopement with prior attempts  Remains medically stable/cleared for inpatient psychiatric placement when bed available    Toxic encephalopathy  Assessment & Plan  Presented with lethargy, delirium on admission. Likely in setting of Flexeril overdose. Coma panel, valproic acid levels, ammonia levels wnl on admission  Repeat valproic acid levels decreased at 23, can monitor outpatient PRN  UDS: + THC. UA unremarkable, VBG acceptable. Resolved, back to baseline mentation. A&O x4.      Benign essential hypertension  Assessment & Plan  BP intermittently fluctuating, overall stable today  Continue home clonidine, Lasix, Lopressor, Norvasc, losartan  Monitor for hypotension, adjust parameters PRN    History of pulmonary embolism  Assessment & Plan  History of PE, anticoagulated on Coumadin  INR slightly subtherapeutic, 1.96 today  Increase warfarin 4->5 mg daily for now  Goal INR 2-3, adjust dose as indicated    Results from last 7 days   Lab Units 11/10/23  0505 11/09/23  0445 11/08/23  0435 11/07/23  0459   INR  1.96* 1.98* 1.85* 1.98*         Bilateral leg edema  Assessment & Plan  History of chronic bilateral lower extremity edema  Clinically euvolemic, at baseline this admission  Continue home PO Lasix dosing     BMI 45.0-49.9, adult ([address])  Assessment & Plan  Body mass index is 48.26 kg/m². Affects all levels of care  Dietary/lifestyle modifications encouraged    MAG (obstructive sleep apnea)  Assessment & Plan  Continue CPAP qHS, supplies brought in from home    Hypothyroidism  Assessment & Plan  TSH elevated 8.652, nl free T4 this admission  Likely subclinical in setting of medication overdose  Continue home levothyroxine dose               VTE Pharmacologic Prophylaxis: VTE Score: 6 High Risk (Score >/= 5) - Pharmacological DVT Prophylaxis Ordered: warfarin (Coumadin). Sequential Compression Devices Ordered. Patient Centered Rounds: I performed bedside rounds with nursing staff today. Discussions with Specialists or Other Care Team Provider: Case management    Education and Discussions with Family / Patient: Patient declined call to . Total Time Spent on Date of Encounter in care of patient: 35 mins. This time was spent on one or more of the following: performing physical exam; counseling and coordination of care; obtaining or reviewing history; documenting in the medical record; reviewing/ordering tests, medications or procedures; communicating with other healthcare professionals and discussing with patient's family/caregivers. Current Length of Stay: 3 day(s)  Current Patient Status: Inpatient   Certification Statement: The patient will continue to require additional inpatient hospital stay due to pending inpatient psychiatric placement  Discharge Plan: Anticipate discharge later today or tomorrow to inpatient psych. Code Status: Level 1 - Full Code    Subjective:   Patient is seen at bedside this a.m., sitting up in chair after showering, denies any acute complaints at this time. Of note, patient attempted elopement yesterday, once brought back to room was redirectable and calm.  Behavior today normal, patient calm and cooperative but did express feeling antsy to go to inpatient psychiatry ASAP. Objective:     Vitals:   Temp (24hrs), Av.9 °F (36.6 °C), Min:97.5 °F (36.4 °C), Max:98.2 °F (36.8 °C)    Temp:  [97.5 °F (36.4 °C)-98.2 °F (36.8 °C)] 97.9 °F (36.6 °C)  HR:  [63-79] 63  Resp:  [17-18] 17  BP: (110-141)/(63-87) 141/87  SpO2:  [95 %-98 %] 98 %  Body mass index is 48.26 kg/m². Input and Output Summary (last 24 hours): Intake/Output Summary (Last 24 hours) at 11/10/2023 6007  Last data filed at 11/10/2023 0801  Gross per 24 hour   Intake 440 ml   Output --   Net 440 ml       Physical Exam:   Physical Exam  Constitutional:       General: She is not in acute distress. Appearance: She is obese. She is not ill-appearing, toxic-appearing or diaphoretic. Cardiovascular:      Rate and Rhythm: Normal rate and regular rhythm. Pulses: Normal pulses. Heart sounds: Normal heart sounds. Pulmonary:      Effort: Pulmonary effort is normal. No respiratory distress. Breath sounds: Normal breath sounds. Abdominal:      General: Bowel sounds are normal. There is no distension. Palpations: Abdomen is soft. Tenderness: There is no abdominal tenderness. Musculoskeletal:         General: Swelling present. No tenderness. Comments: Mild bilateral lower leg, nonpitting edema   Skin:     General: Skin is warm. Neurological:      General: No focal deficit present. Mental Status: She is alert and oriented to person, place, and time. Mental status is at baseline.    Psychiatric:         Mood and Affect: Mood normal.         Behavior: Behavior normal.          Additional Data:     Labs:  Results from last 7 days   Lab Units 23  0445 23  0710   WBC Thousand/uL 9.45 10.36*   HEMOGLOBIN g/dL 13.5 14.6   HEMATOCRIT % 43.1 45.2   PLATELETS Thousands/uL 156 147*   NEUTROS PCT %  --  62   LYMPHS PCT %  --  26   MONOS PCT %  --  9   EOS PCT %  --  1     Results from last 7 days   Lab Units 23  0445   SODIUM mmol/L 139   POTASSIUM mmol/L 3.7   CHLORIDE mmol/L 106   CO2 mmol/L 23   BUN mg/dL 18   CREATININE mg/dL 0.61   ANION GAP mmol/L 10   CALCIUM mg/dL 9.0   ALBUMIN g/dL 3.7   TOTAL BILIRUBIN mg/dL 0.31   ALK PHOS U/L 47   ALT U/L 10   AST U/L 12*   GLUCOSE RANDOM mg/dL 95     Results from last 7 days   Lab Units 11/10/23  0505   INR  1.96*     Results from last 7 days   Lab Units 11/06/23  1817   POC GLUCOSE mg/dl 95               Lines/Drains:  Invasive Devices       None                         Imaging: No pertinent imaging reviewed. Recent Cultures (last 7 days):         Last 24 Hours Medication List:   Current Facility-Administered Medications   Medication Dose Route Frequency Provider Last Rate    amLODIPine  5 mg Oral Daily Zoila Stoudt, PA-LUIS      atoMOXetine  40 mg Oral Daily Zoila Stoudt, PA-C      buPROPion  300 mg Oral Daily Zoila Stoudt, PA-C      cholecalciferol  1,000 Units Oral Daily Zoila Stoudt, PA-C      cloNIDine  0.1 mg Oral Q12H [address] Zoila Stoudt, PA-C      divalproex sodium  750 mg Oral Daily Zoila Stoudt, PA-C      furosemide  40 mg Oral Daily Zoila Stoudt, PA-C      levothyroxine  125 mcg Oral Early Morning Zoila Stoudt, PA-C      LORazepam  0.5 mg Intramuscular Q6H PRN Katy Underwood, PA-C      LORazepam  1 mg Oral Q8H PRN Noel Reddy DO      losartan  50 mg Oral Daily Zoila Stoudt, PA-C      lurasidone  40 mg Oral BID Zoila Stoudt, PA-C      metoprolol tartrate  25 mg Oral Q12H [address] Zoila Stoudt, PA-C      naltrexone  50 mg Oral Daily Zoila Stoudt, PA-C      nystatin   Topical BID Zoila Stoudt, PA-C      pantoprazole  20 mg Oral Early Morning Zoila Stoudt, PA-C      topiramate  200 mg Oral BID Zoila Stoudt, PA-C      warfarin  5 mg Oral Daily Perlita Aguiar PA-C          Today, Patient Was Seen By: Perlita Aguiar PA-C    **Please Note: This note may have been constructed using a voice recognition system. ** No psychiatric contraindications to discharge

## 2025-03-12 NOTE — PROGRESS NOTES
Name: Blanca Mason      : 1970      MRN: [id number]  Encounter Provider: JHONATAN Armando  Encounter Date: 3/12/2025   Encounter department: [facility]  :  Assessment & Plan  Upper respiratory tract infection, unspecified type  Symptom onset: 25  Covid/Flu collected today     Make sure you have a thermometer and if you feel chills or sweats check it and write it down.  Take Tylenol for fevers, body aches, and headaches  Drink plenty of fluids to stay well hydrated at least 2 L per day  Hot water with lemon or honey, warm tea, Can drink Gatorade/Powerade zero, mix with water    Use over-the-counter saline nasal spray/allergy medication for congestion, runny nose, and postnasal drip  Over the counter Mucinex to help clear mucus. Delsym is a cough suppressant.   Vicks to the front/back of the chest bottom of the feet with socks   Stay out of work/school until afebrile >24 hours without use of antipyretics         Substance abuse (HCC)  PDMP reviewed.   Denies use.        Chronic obstructive pulmonary disease, unspecified COPD type (HCC)  Well controlled.   No medications needed at this time       Xanthelasma  Due to hyperlipidemia  Will work on diet and lifestyle   List of dermatologist provided.                History of Present Illness   URI   This is a chronic problem. Associated symptoms include coughing and a sore throat. Pertinent negatives include no abdominal pain, chest pain, congestion, diarrhea, dysuria, ear pain, headaches, nausea, rash, rhinorrhea or vomiting.     Review of Systems   Constitutional:  Negative for activity change, chills, fatigue and fever.   HENT:  Positive for sore throat. Negative for congestion, ear pain, rhinorrhea and trouble swallowing.    Eyes:  Negative for pain and visual disturbance.   Respiratory:  Positive for cough. Negative for chest tightness and shortness of breath.    Cardiovascular:  Negative for chest pain,  "palpitations and leg swelling.   Gastrointestinal:  Negative for abdominal pain, constipation, diarrhea, nausea and vomiting.   Genitourinary:  Negative for difficulty urinating, dysuria, hematuria and urgency.   Musculoskeletal:  Negative for arthralgias and back pain.   Skin:  Negative for color change and rash.   Neurological:  Negative for dizziness, seizures, syncope and headaches.   Psychiatric/Behavioral:  Negative for dysphoric mood. The patient is not nervous/anxious.    All other systems reviewed and are negative.      Objective   /88 (BP Location: Left arm, Patient Position: Sitting, Cuff Size: Large)   Pulse 93   Temp 98 °F (36.7 °C)   Ht 5' 6\" (1.676 m)   Wt 136 kg (299 lb 3.2 oz)   SpO2 94%   BMI 48.29 kg/m²      Physical Exam  Vitals and nursing note reviewed.   Constitutional:       General: She is not in acute distress.     Appearance: Normal appearance. She is well-developed and normal weight.   HENT:      Head: Normocephalic and atraumatic.      Right Ear: Tympanic membrane, ear canal and external ear normal. There is no impacted cerumen.      Left Ear: Tympanic membrane, ear canal and external ear normal. There is no impacted cerumen.      Nose: Nose normal.      Mouth/Throat:      Mouth: Mucous membranes are moist.      Pharynx: Oropharynx is clear.   Eyes:      Extraocular Movements: Extraocular movements intact.      Conjunctiva/sclera: Conjunctivae normal.      Pupils: Pupils are equal, round, and reactive to light.        Comments: Xanthelasma on marked areas    Cardiovascular:      Rate and Rhythm: Normal rate and regular rhythm.      Pulses: Normal pulses.      Heart sounds: Normal heart sounds. No murmur heard.  Pulmonary:      Effort: Pulmonary effort is normal. No respiratory distress.      Breath sounds: Normal breath sounds.   Abdominal:      General: Bowel sounds are normal.      Palpations: Abdomen is soft.      Tenderness: There is no abdominal tenderness. "   Musculoskeletal:         General: Normal range of motion.      Cervical back: Normal range of motion and neck supple.      Right lower leg: No edema.      Left lower leg: No edema.   Skin:     General: Skin is warm and dry.      Capillary Refill: Capillary refill takes less than 2 seconds.   Neurological:      General: No focal deficit present.      Mental Status: She is alert and oriented to person, place, and time. Mental status is at baseline.   Psychiatric:         Mood and Affect: Mood normal.         Behavior: Behavior normal.         Thought Content: Thought content normal.         Judgment: Judgment normal.     Administrative Statements   I have spent a total time of 20 minutes in caring for this patient on the day of the visit/encounter including Diagnostic results, Prognosis, Risks and benefits of tx options, Instructions for management, Patient and family education, Importance of tx compliance, Risk factor reductions, Impressions, Counseling / Coordination of care, Documenting in the medical record, Reviewing/placing orders in the medical record (including tests, medications, and/or procedures), and Obtaining or reviewing history  .    Strongly encourage getting plenty of rest over the next few days.  Increase your hydration.    Vaporizer by bedside may also be helpful.          Symptom Relief Suggestions:     Options for sore throat or hoarseness:               * Warm salt water gargles every 1-2 hours while awake        * Throat lozenges, Tylenol, voice rest, warm tea with honey.     Options for sinus pressure, nasal congestion, runny nose, and / or post nasal drip:            * Clearing your sinuses in a nice steamy shower may be helpful, especially first thing after waking.       * Nasal saline rinses every 1-2 hours while awake may also help decrease nasal congestion, drainage.       * Afrin nasal spray if significant nasal congestion at bedtime may use .  (Do not use for over 3 days however.)        * Decongestant / expectorant such as Mucinex D 12 hour 1/2 to 1 tablet as needed with full glass of fluids may help decrease pressure and drainage.     Options for ear pressure, discomfort:          * Decongestant may be helpful.       * Flonase nasal spray.       * Warm compresses against ear(s) for comfort.     Options for help with  cough:          * Warm tea with honey or a teaspoon of honey periodically throughout day and / or before bed.       * Plain Mucinex (an expectorant to help keep mucus thin so you can clear it easier) or Mucinex DM (expectorant / cough suppressant) to help decrease cough if it is bothering your sleep.        Other night time cough medication options include Delsym, Robitussin DM, NyQuil.         * Propping with an extra pillow or two may be helpful.       * Keep water by your bedside to sip on as needed.       * Cough drops.       * Vaporizer by bedside.       * Getting in steamy shower or bathroom.  Cool air may also soothe coughing spasm.

## 2025-03-13 ENCOUNTER — RESULTS FOLLOW-UP (OUTPATIENT)
Dept: FAMILY MEDICINE CLINIC | Facility: CLINIC | Age: 55
End: 2025-03-13

## 2025-03-13 LAB
FLUAV RNA RESP QL NAA+PROBE: POSITIVE
FLUBV RNA RESP QL NAA+PROBE: NEGATIVE
SARS-COV-2 RNA RESP QL NAA+PROBE: NEGATIVE

## 2025-03-13 NOTE — TELEPHONE ENCOUNTER
Patient called.    A.  Relayed results to (patient/patient representative as listed on communication consent form) as per provider message.     Results reviewed. Positive for influenza A, continue supportive therapies as previously discussed.     Patient/Patient Representative expressed understanding and had the following question(s):     Pt would like to know if she is still contagious?  Also, is requesting a work excuse.  Excuse pt from work 3/11 - 3/14.  Pt works Tuesdays and Thursdays. Is she OK to return 3/18 as long as she is feeling well?  Pt will call back with fax# so we can fax excuse to her employer.   Please advise.

## 2025-03-14 NOTE — TELEPHONE ENCOUNTER
Patient called and stated fax number is [phone]. Stated she would need the note for this past Tuesday and Friday and is hoping to go back to work this coming Tuesday the 18th .

## 2025-03-18 ENCOUNTER — TELEPHONE (OUTPATIENT)
Dept: HEMATOLOGY ONCOLOGY | Facility: CLINIC | Age: 55
End: 2025-03-18

## 2025-03-18 ENCOUNTER — APPOINTMENT (OUTPATIENT)
Dept: LAB | Facility: CLINIC | Age: 55
End: 2025-03-18
Payer: COMMERCIAL

## 2025-03-18 DIAGNOSIS — Z86.711 HISTORY OF PULMONARY EMBOLISM: ICD-10-CM

## 2025-03-18 LAB
INR PPP: 4.83 (ref 0.85–1.19)
PROTHROMBIN TIME: 44.1 SECONDS (ref 12.3–15)

## 2025-03-18 PROCEDURE — 36415 COLL VENOUS BLD VENIPUNCTURE: CPT

## 2025-03-18 PROCEDURE — 85610 PROTHROMBIN TIME: CPT

## 2025-03-18 NOTE — TELEPHONE ENCOUNTER
Spoke with Blanca and informed her that her INR is elevated, 4.83.  She has not taken her Coumadin yet, recommend she hold it today and tomorrow and restart tomorrow 5 mg Thursday and Friday, 2 mg Saturday and Sunday and then continue the normal regimen of Coumadin 5 mg Monday through Friday and Coumadin 2 mg Saturday and Sunday.    In addition, I have not seen the patient in the office as she has canceled her appointment therefore, recommend she come see me at least once a year since I am managing her anticoagulation.  Patient is agreeable.  Informed her our office will reach out to her to schedule an appointment.  Patient voiced understanding of the above.

## 2025-03-19 ENCOUNTER — TELEPHONE (OUTPATIENT)
Dept: HEMATOLOGY ONCOLOGY | Facility: CLINIC | Age: 55
End: 2025-03-19

## 2025-03-19 NOTE — TELEPHONE ENCOUNTER
A call was placed to Blanca, reached her voicemail, a message was left. Please call the office to schedule an appointment with Heather Ramirez so the office can continue to monitor your Coumadin dosing.

## 2025-03-20 ENCOUNTER — DOCUMENTATION (OUTPATIENT)
Dept: CASE MANAGEMENT | Facility: HOSPITAL | Age: 55
End: 2025-03-20

## 2025-03-20 NOTE — BEHAVIORAL HEALTH HIGH UTILIZER
Patient Name:Blanca Mason MRN:  [id number]         : 1970     Age: 54 y.o.    Sex: female   Utilization History:  (# of ED visits & IP admits; reasons)  Pt had 4 [facility]-ED visits from 2024- 2025. ED presentations were related to SI with no plan or with a plan to overdose on medications. Pt intentionally overdosed on Flexeril twice and Trazodone twice. Pt reports periodic non-compliance with her medications.       Medical presentations were related to chest pain, right knee pain, withdrawing from meth, asthma exacerbation, fecal impaction, abdominal pain, toe or leg pain, dyspnea, SOB, bilateral leg edema, acute Saddle PE, acute respiratory failure, left shoulder pain, elevated INR, cellulitis of right lower extremity, right foot pain.      Treatment Recommendations & Presentation:  Presentation in the ED: Pt typically presents self to ED's. ED visits were related to SI with no plan or with a plan to overdose on medications. Pt intentionally overdosed on Flexeril twice and Trazodone twice. Pt reports periodic non-compliance with her medications.       Medical visits were related to chest pain, right knee pain, withdrawing from meth, asthma exacerbation, fecal impaction, abdominal pain, toe or leg pain, dyspnea, SOB, bilateral leg edema, acute Saddle PE, acute respiratory failure, elevated INR, cellulitis right lower extremity, right foot pain.             ED Recommendations: Following medical evaluation and treatment, establish acute risk versus pt's chronic behavioral disturbances. Please contact pt's [facility] ACT team to develop a safe discharge plan for pt to return home at [phone]. Pt should be contacting her ACT team when in Crisis and formulating an action plan to include distress tolerance coping skills.   Pt should continue her medical treatment with her multiple medical speciality providers.       Home Medication Regimen:  see most recent documentation in Epic, you can also verify  pt's medications with her ACT team            Recent Medical Work Ups:  (include Psych testing or ECT)     Labs -   Various Xrays, CT's, ultrasound -   ECG, stress test -   Right foot and toe surgery 2024 Inpatient Recommendations: Collaborate with pt's community resources to develop a comprehensive discharge plan.           Outpatient recommendations:  Pt should continue her medical and mental health care with her community providers and take her medications as prescribed.            Situation/Relevant Background Info:    Pt is a 54 year old female with a diagnosis of Schizoaffective Disorder/ Bipolar type, Borderline Personality Disorder and a past history of meth abuse (clean since 2023 but uses medical marijuana) and numerous behavioral health hospitalizations.     Pt lives alone in an apartment that is close to where her father resides and has the [facility] ACT team. Pt's mother  a few years ago in  and pt reports this as a difficult issue for her.   Pt has experienced various medical issues in  and appears to be very preoccupied with her medical conditions, perceived medical issues and treatment and becomes frustrated if she feels she is not obtaining the medical care she needs in a timely manner. Pt has displayed demanding and agitated behaviors while on behavioral health units.  Pt was active in going to see her father in the community and helping him as well as working part-time approx 21 hours per week. It is unknown if pt is still working. Pt appears to have poor distress tolerance and impulsivity. Pt appears to want to control her discharge date from inpatient units.  Pt has multiple medical speciality providers in addition to her PCP office: [facility], [facility], [facility],  [facility], [facility],  [facility],  [facility].  Pt had right foot bunionectomy and hammertoe surgery 2024.                              Diagnoses/Significant Problems (Medical & Psychiatric):      Schizoaffective disorder, bipolar type, Borderline Personality Disorder  Active Problems:    Benign essential hypertension    Edema    Hypothyroidism    MAG    Overactive bladder    Sjogren's syndrome (HCC)    COPD (chronic obstructive pulmonary disease) ([facility])    Medical clearance for psychiatric admission    History of pulmonary embolism    Ingrown toenail                Drivers of repeated utilization:       preoccupation with medical concerns and issues, personality disordered behaviors, limited to poor coping skills, impulsivity, secondary gains from ED visits and hospitalizations.                                                Existing Community Resources & Supports:                 father Luigi Mason - [phone]     Patient Medical & Psychiatric Care Team:  [facility] PCP - [phone]  [facility]  [facility]  [facility]  [facility]  [facility]  [facility]    Care plan date: 03/20/25                   Author:  Dennise Napier RN                 Date reviewed with patient:

## 2025-03-25 ENCOUNTER — TRANSCRIBE ORDERS (OUTPATIENT)
Dept: LAB | Facility: CLINIC | Age: 55
End: 2025-03-25

## 2025-03-25 ENCOUNTER — TELEPHONE (OUTPATIENT)
Dept: HEMATOLOGY ONCOLOGY | Facility: CLINIC | Age: 55
End: 2025-03-25

## 2025-03-25 ENCOUNTER — APPOINTMENT (OUTPATIENT)
Dept: LAB | Facility: CLINIC | Age: 55
End: 2025-03-25
Payer: COMMERCIAL

## 2025-03-25 DIAGNOSIS — J45.909 ASTHMATIC BRONCHITIS WITHOUT COMPLICATION, UNSPECIFIED ASTHMA SEVERITY, UNSPECIFIED WHETHER PERSISTENT: ICD-10-CM

## 2025-03-25 DIAGNOSIS — F42.2 MIXED OBSESSIONAL THOUGHTS AND ACTS: ICD-10-CM

## 2025-03-25 DIAGNOSIS — F41.9 ANXIETY DISORDER OF CHILDHOOD OR ADOLESCENCE: ICD-10-CM

## 2025-03-25 DIAGNOSIS — F60.3 EXPLOSIVE PERSONALITY DISORDER (HCC): ICD-10-CM

## 2025-03-25 DIAGNOSIS — I10 ESSENTIAL HYPERTENSION, MALIGNANT: ICD-10-CM

## 2025-03-25 DIAGNOSIS — G47.00 INSOMNIA WITH SLEEP APNEA: ICD-10-CM

## 2025-03-25 DIAGNOSIS — K21.00 GASTROESOPHAGEAL REFLUX DISEASE WITH ESOPHAGITIS, UNSPECIFIED WHETHER HEMORRHAGE: ICD-10-CM

## 2025-03-25 DIAGNOSIS — F31.81 BIPOLAR II DISORDER (HCC): ICD-10-CM

## 2025-03-25 DIAGNOSIS — I51.9 MYXEDEMA HEART DISEASE: ICD-10-CM

## 2025-03-25 DIAGNOSIS — G47.30 INSOMNIA WITH SLEEP APNEA: ICD-10-CM

## 2025-03-25 DIAGNOSIS — Z86.711 HISTORY OF PULMONARY EMBOLISM: ICD-10-CM

## 2025-03-25 DIAGNOSIS — E03.9 MYXEDEMA HEART DISEASE: ICD-10-CM

## 2025-03-25 DIAGNOSIS — F31.81 BIPOLAR II DISORDER (HCC): Primary | ICD-10-CM

## 2025-03-25 DIAGNOSIS — F32.A DEPRESSIVE TYPE PSYCHOSIS: ICD-10-CM

## 2025-03-25 LAB
ALBUMIN SERPL BCG-MCNC: 3.7 G/DL (ref 3.5–5)
ALP SERPL-CCNC: 86 U/L (ref 34–104)
ALT SERPL W P-5'-P-CCNC: 16 U/L (ref 7–52)
ANION GAP SERPL CALCULATED.3IONS-SCNC: 11 MMOL/L (ref 4–13)
AST SERPL W P-5'-P-CCNC: 14 U/L (ref 13–39)
BASOPHILS # BLD AUTO: 0.05 THOUSANDS/ÂΜL (ref 0–0.1)
BASOPHILS NFR BLD AUTO: 1 % (ref 0–1)
BILIRUB SERPL-MCNC: 0.2 MG/DL (ref 0.2–1)
BUN SERPL-MCNC: 12 MG/DL (ref 5–25)
CALCIUM SERPL-MCNC: 9.1 MG/DL (ref 8.4–10.2)
CHLORIDE SERPL-SCNC: 107 MMOL/L (ref 96–108)
CHOLEST SERPL-MCNC: 145 MG/DL (ref ?–200)
CO2 SERPL-SCNC: 25 MMOL/L (ref 21–32)
CREAT SERPL-MCNC: 0.61 MG/DL (ref 0.6–1.3)
EOSINOPHIL # BLD AUTO: 0.07 THOUSAND/ÂΜL (ref 0–0.61)
EOSINOPHIL NFR BLD AUTO: 1 % (ref 0–6)
ERYTHROCYTE [DISTWIDTH] IN BLOOD BY AUTOMATED COUNT: 15.2 % (ref 11.6–15.1)
GFR SERPL CREATININE-BSD FRML MDRD: 103 ML/MIN/1.73SQ M
GLUCOSE P FAST SERPL-MCNC: 112 MG/DL (ref 65–99)
HCT VFR BLD AUTO: 41.6 % (ref 34.8–46.1)
HDLC SERPL-MCNC: 51 MG/DL
HGB BLD-MCNC: 12.7 G/DL (ref 11.5–15.4)
IMM GRANULOCYTES # BLD AUTO: 0.12 THOUSAND/UL (ref 0–0.2)
IMM GRANULOCYTES NFR BLD AUTO: 1 % (ref 0–2)
INR PPP: 1.39 (ref 0.85–1.19)
LDLC SERPL CALC-MCNC: 56 MG/DL (ref 0–100)
LYMPHOCYTES # BLD AUTO: 2.58 THOUSANDS/ÂΜL (ref 0.6–4.47)
LYMPHOCYTES NFR BLD AUTO: 30 % (ref 14–44)
MCH RBC QN AUTO: 27.5 PG (ref 26.8–34.3)
MCHC RBC AUTO-ENTMCNC: 30.5 G/DL (ref 31.4–37.4)
MCV RBC AUTO: 90 FL (ref 82–98)
MONOCYTES # BLD AUTO: 0.89 THOUSAND/ÂΜL (ref 0.17–1.22)
MONOCYTES NFR BLD AUTO: 10 % (ref 4–12)
NEUTROPHILS # BLD AUTO: 4.92 THOUSANDS/ÂΜL (ref 1.85–7.62)
NEUTS SEG NFR BLD AUTO: 57 % (ref 43–75)
NONHDLC SERPL-MCNC: 94 MG/DL
NRBC BLD AUTO-RTO: 0 /100 WBCS
PLATELET # BLD AUTO: 178 THOUSANDS/UL (ref 149–390)
PMV BLD AUTO: 10.7 FL (ref 8.9–12.7)
POTASSIUM SERPL-SCNC: 4 MMOL/L (ref 3.5–5.3)
PROT SERPL-MCNC: 6.6 G/DL (ref 6.4–8.4)
PROTHROMBIN TIME: 17.3 SECONDS (ref 12.3–15)
RBC # BLD AUTO: 4.61 MILLION/UL (ref 3.81–5.12)
SODIUM SERPL-SCNC: 143 MMOL/L (ref 135–147)
T4 FREE SERPL-MCNC: 0.73 NG/DL (ref 0.61–1.12)
TRIGL SERPL-MCNC: 190 MG/DL (ref ?–150)
TSH SERPL DL<=0.05 MIU/L-ACNC: 6.12 UIU/ML (ref 0.45–4.5)
WBC # BLD AUTO: 8.63 THOUSAND/UL (ref 4.31–10.16)

## 2025-03-25 PROCEDURE — 84439 ASSAY OF FREE THYROXINE: CPT

## 2025-03-25 PROCEDURE — 85025 COMPLETE CBC W/AUTO DIFF WBC: CPT

## 2025-03-25 PROCEDURE — 80061 LIPID PANEL: CPT

## 2025-03-25 PROCEDURE — 36415 COLL VENOUS BLD VENIPUNCTURE: CPT

## 2025-03-25 PROCEDURE — 84443 ASSAY THYROID STIM HORMONE: CPT

## 2025-03-25 PROCEDURE — 80053 COMPREHEN METABOLIC PANEL: CPT

## 2025-03-25 PROCEDURE — 85610 PROTHROMBIN TIME: CPT

## 2025-03-25 NOTE — TELEPHONE ENCOUNTER
Spoke with patient regarding her INR, which is 1.39.  Recommend she take Coumadin 6 mg today (Tuesday).  Then back to the regular doses of 5 mg until Friday.  She will take 7 mg Saturday and Sunday.  She will resume taking Coumadin 5 mg Monday through Friday and 7 mg Saturday and Sunday starting 3/25/2025.    Repeat INR next week.  Patient voiced understanding.

## 2025-03-25 NOTE — TELEPHONE ENCOUNTER
"FYI:  Pt calling back to review Coumadin instructions.  The following was read to the pt:  \" Recommend she take Coumadin 6 mg today (Tuesday).  Then back to the regular doses of 5 mg until Friday.  She will take 7 mg Saturday and Sunday.  She will resume taking Coumadin 5 mg Monday through Friday and 7 mg Saturday and Sunday starting 3/25/2025 \".    Pt repeated and understood.Offered to send instructions to pt Taz pt stated she's locked out and has to reset her password but was appreciative of the thought.  "

## 2025-03-31 ENCOUNTER — TELEPHONE (OUTPATIENT)
Age: 55
End: 2025-03-31

## 2025-03-31 ENCOUNTER — APPOINTMENT (OUTPATIENT)
Dept: LAB | Facility: CLINIC | Age: 55
End: 2025-03-31

## 2025-03-31 DIAGNOSIS — Z86.718 HISTORY OF DVT (DEEP VEIN THROMBOSIS): Primary | ICD-10-CM

## 2025-03-31 DIAGNOSIS — E78.2 MIXED DYSLIPIDEMIA: ICD-10-CM

## 2025-03-31 DIAGNOSIS — Z51.81 ANTICOAGULATION MANAGEMENT ENCOUNTER: ICD-10-CM

## 2025-03-31 DIAGNOSIS — Z79.01 ANTICOAGULATION MANAGEMENT ENCOUNTER: ICD-10-CM

## 2025-03-31 DIAGNOSIS — Z79.01 ANTICOAGULATION MANAGEMENT ENCOUNTER: Primary | ICD-10-CM

## 2025-03-31 DIAGNOSIS — Z51.81 ANTICOAGULATION MANAGEMENT ENCOUNTER: Primary | ICD-10-CM

## 2025-03-31 NOTE — TELEPHONE ENCOUNTER
"FYI:  Pt went to lab today to get her labs drawn was told no order placed.  Per note 3/25 from Heather ISRAEL \".Repeat INR next week \"  Orders placed PT/INR Pt will go tomorrow.  "

## 2025-04-01 ENCOUNTER — TELEPHONE (OUTPATIENT)
Dept: HEMATOLOGY ONCOLOGY | Facility: CLINIC | Age: 55
End: 2025-04-01

## 2025-04-01 ENCOUNTER — APPOINTMENT (OUTPATIENT)
Dept: LAB | Facility: CLINIC | Age: 55
End: 2025-04-01
Payer: COMMERCIAL

## 2025-04-01 DIAGNOSIS — Z51.81 ANTICOAGULATION MANAGEMENT ENCOUNTER: ICD-10-CM

## 2025-04-01 DIAGNOSIS — Z86.718 HISTORY OF DVT (DEEP VEIN THROMBOSIS): ICD-10-CM

## 2025-04-01 DIAGNOSIS — Z79.01 ANTICOAGULATION MANAGEMENT ENCOUNTER: ICD-10-CM

## 2025-04-01 LAB
INR PPP: 5.05 (ref 0.85–1.19)
PROTHROMBIN TIME: 45.6 SECONDS (ref 12.3–15)

## 2025-04-01 PROCEDURE — 85610 PROTHROMBIN TIME: CPT

## 2025-04-01 PROCEDURE — 36415 COLL VENOUS BLD VENIPUNCTURE: CPT

## 2025-04-01 NOTE — TELEPHONE ENCOUNTER
Spoke with Blanca, she is not bleeding and her INR is 5.0  At this time, patient has not taken her Coumadin.  Recommend she hold it today, tomorrow, then get her INR Thursday in the morning.  She is not to start coumadin until I speak with her on Thursday.  Advised patient to go to the ER should she experience any abnormal bleeding.  Patient voiced understanding.

## 2025-04-03 ENCOUNTER — APPOINTMENT (OUTPATIENT)
Dept: LAB | Facility: CLINIC | Age: 55
End: 2025-04-03
Payer: COMMERCIAL

## 2025-04-03 ENCOUNTER — TELEPHONE (OUTPATIENT)
Dept: HEMATOLOGY ONCOLOGY | Facility: CLINIC | Age: 55
End: 2025-04-03

## 2025-04-03 DIAGNOSIS — Z79.01 ANTICOAGULATION MANAGEMENT ENCOUNTER: ICD-10-CM

## 2025-04-03 DIAGNOSIS — Z86.718 HISTORY OF DVT (DEEP VEIN THROMBOSIS): ICD-10-CM

## 2025-04-03 DIAGNOSIS — Z51.81 ANTICOAGULATION MANAGEMENT ENCOUNTER: ICD-10-CM

## 2025-04-03 LAB
INR PPP: 4.83 (ref 0.85–1.19)
PROTHROMBIN TIME: 44.1 SECONDS (ref 12.3–15)

## 2025-04-03 PROCEDURE — 85610 PROTHROMBIN TIME: CPT

## 2025-04-03 PROCEDURE — 36415 COLL VENOUS BLD VENIPUNCTURE: CPT

## 2025-04-03 NOTE — TELEPHONE ENCOUNTER
Spoke with Blanca, her INR did come down to 4.83.  Recommend she hold her Coumadin today, Friday, 4/4/2025, Saturday, 4/5/2025, take Coumadin 2 mg Sunday for 6/20/2025.  Repeat INR Monday, 4/7/2025.    Patient is not having any bleeding or bruising.  Recommend she get to the ER ASAP between now and Monday should she have any kind of abnormal bleeding.  Patient voiced understanding.

## 2025-04-08 ENCOUNTER — APPOINTMENT (OUTPATIENT)
Dept: LAB | Facility: CLINIC | Age: 55
End: 2025-04-08
Payer: COMMERCIAL

## 2025-04-08 ENCOUNTER — TELEPHONE (OUTPATIENT)
Dept: HEMATOLOGY ONCOLOGY | Facility: CLINIC | Age: 55
End: 2025-04-08

## 2025-04-08 DIAGNOSIS — Z79.01 ANTICOAGULATION MANAGEMENT ENCOUNTER: ICD-10-CM

## 2025-04-08 DIAGNOSIS — Z86.718 HISTORY OF DVT (DEEP VEIN THROMBOSIS): ICD-10-CM

## 2025-04-08 DIAGNOSIS — Z51.81 ANTICOAGULATION MANAGEMENT ENCOUNTER: ICD-10-CM

## 2025-04-08 LAB
INR PPP: 1.15 (ref 0.85–1.19)
PROTHROMBIN TIME: 15 SECONDS (ref 12.3–15)

## 2025-04-08 PROCEDURE — 85610 PROTHROMBIN TIME: CPT

## 2025-04-08 PROCEDURE — 36415 COLL VENOUS BLD VENIPUNCTURE: CPT

## 2025-04-08 NOTE — TELEPHONE ENCOUNTER
Spoke with Blanca to review her INR 1.15.  Recommend she start Coumadin 5 mg Monday through Friday and 2 mg Saturday and Sunday.  Repeat INR Monday, April 14, 2025.  Patient is also getting foot surgery on 5/14/2025.  I will need to see her in the office to give her instructions on her anticoagulation.  I will see her in May 1, 2025 at noon.  Patient voiced understanding.

## 2025-04-14 ENCOUNTER — TELEPHONE (OUTPATIENT)
Age: 55
End: 2025-04-14

## 2025-04-14 ENCOUNTER — APPOINTMENT (OUTPATIENT)
Dept: LAB | Facility: CLINIC | Age: 55
End: 2025-04-14
Payer: COMMERCIAL

## 2025-04-14 ENCOUNTER — TELEPHONE (OUTPATIENT)
Dept: HEMATOLOGY ONCOLOGY | Facility: CLINIC | Age: 55
End: 2025-04-14

## 2025-04-14 DIAGNOSIS — Z51.81 ANTICOAGULATION MANAGEMENT ENCOUNTER: ICD-10-CM

## 2025-04-14 DIAGNOSIS — Z79.01 ANTICOAGULATION MANAGEMENT ENCOUNTER: ICD-10-CM

## 2025-04-14 DIAGNOSIS — Z86.718 HISTORY OF DVT (DEEP VEIN THROMBOSIS): ICD-10-CM

## 2025-04-14 LAB
INR PPP: 2.26 (ref 0.85–1.19)
PROTHROMBIN TIME: 24.9 SECONDS (ref 12.3–15)

## 2025-04-14 PROCEDURE — 36415 COLL VENOUS BLD VENIPUNCTURE: CPT

## 2025-04-14 PROCEDURE — 85610 PROTHROMBIN TIME: CPT

## 2025-04-14 NOTE — TELEPHONE ENCOUNTER
LVM for patient to continue Coumadin 5 mg Monday through Friday and 2 mg Saturday and SUnday.  Repeat INR Monday.

## 2025-04-14 NOTE — TELEPHONE ENCOUNTER
Pt called in response to Heather Dale's phone call.    Reviewed message with pt: LVM for patient to continue Coumadin 5 mg Monday through Friday and 2 mg Saturday and SUnday. Repeat INR Monday.     Pt thanked me for the information.

## 2025-04-17 ENCOUNTER — CONSULT (OUTPATIENT)
Dept: FAMILY MEDICINE CLINIC | Facility: CLINIC | Age: 55
End: 2025-04-17
Payer: COMMERCIAL

## 2025-04-17 VITALS
HEART RATE: 93 BPM | SYSTOLIC BLOOD PRESSURE: 144 MMHG | HEIGHT: 66 IN | WEIGHT: 293 LBS | BODY MASS INDEX: 47.09 KG/M2 | OXYGEN SATURATION: 97 % | DIASTOLIC BLOOD PRESSURE: 90 MMHG | TEMPERATURE: 98 F

## 2025-04-17 DIAGNOSIS — Z86.718 HISTORY OF DVT (DEEP VEIN THROMBOSIS): ICD-10-CM

## 2025-04-17 DIAGNOSIS — Z86.711 HISTORY OF PULMONARY EMBOLISM: ICD-10-CM

## 2025-04-17 DIAGNOSIS — M79.671 PAIN IN RIGHT FOOT: ICD-10-CM

## 2025-04-17 DIAGNOSIS — Z01.818 PREOP EXAMINATION: Primary | ICD-10-CM

## 2025-04-17 DIAGNOSIS — T84.84XA PAIN DUE TO INTERNAL ORTHOPEDIC PROSTHETIC DEVICES, IMPLANTS AND GRAFTS, INITIAL ENCOUNTER (HCC): ICD-10-CM

## 2025-04-17 PROCEDURE — 99214 OFFICE O/P EST MOD 30 MIN: CPT

## 2025-04-17 NOTE — PROGRESS NOTES
[facility] PRE-OPERATIVE EVALUATION  [facility] PHYSICIAN GROUP - [facility]    NAME: Blanca Mason  AGE: 54 y.o. SEX: female  : 1970     DATE: 2025    [facility] Pre-Operative Evaluation      Chief Complaint: Pre-operative Evaluation     Surgery: Foot removal of painful hardware, revision of scar (Right: Foot)   Anticipated Date of Surgery: 25   Referring Provider: Kaz Bautista DPM      History of Present Illness:     Blanca Mason is a 54 y.o. female who presents to the office today for a preoperative consultation at the request of surgeon, Kaz Bautista DPM , who plans on performing Foot removal of painful hardware, revision of scar (Right: Foot)  on 25 . Planned anesthesia is IV sedation and with Anesthesia . Patient has a bleeding risk of: recent abnormal bleeding (DVT/PE). Patient does not have objections to receiving blood products if needed. Current anti-platelet/anti-coagulation medications that the patient is prescribed includes: Warfarin (Coumadin or Jantoven).      Assessment of Chronic Conditions:   - Coronary Artery Disease: DVT/PE - follows with hematology  - Hypertension: well controlled on medications     Assessment of Cardiac Risk:  Denies unstable or severe angina or MI in the last 6 weeks or history of stent placement in the last year   Denies decompensated heart failure (e.g. New onset heart failure, NYHA functional class IV heart failure, or worsening existing heart failure)  Denies significant arrhythmias such as high grade AV block, symptomatic ventricular arrhythmia, newly recognized ventricular tachycardia, supraventricular tachycardia with resting heart rate >100, or symptomatic bradycardia  Denies severe heart valve disease including aortic stenosis or symptomatic mitral stenosis     Exercise Capacity:  Able to walk 4 blocks without symptoms?: Yes  Able to walk 2 flights without symptoms?:  Yes    Prior Anesthesia Reactions: No     Personal history of venous thromboembolic disease? Yes    History of steroid use for >2 weeks within last year? No         Review of Systems:     Review of Systems   Constitutional:  Negative for activity change, chills, fatigue and fever.   HENT:  Negative for congestion, ear pain, rhinorrhea, sore throat and trouble swallowing.    Eyes:  Negative for pain and visual disturbance.   Respiratory:  Negative for cough, chest tightness and shortness of breath.    Cardiovascular:  Negative for chest pain, palpitations and leg swelling.   Gastrointestinal:  Negative for abdominal pain, constipation, diarrhea, nausea and vomiting.   Genitourinary:  Negative for difficulty urinating, dysuria, hematuria and urgency.   Musculoskeletal:  Negative for arthralgias and back pain.   Skin:  Negative for color change and rash.   Neurological:  Negative for dizziness, seizures, syncope and headaches.   Psychiatric/Behavioral:  Negative for dysphoric mood. The patient is not nervous/anxious.    All other systems reviewed and are negative.      Current Problem List:     Patient Active Problem List   Diagnosis    Yan's esophagus determined by biopsy    Benign essential hypertension    Schizoaffective disorder, bipolar type ([facility])    Chronic low back pain    Family history of renal cancer    Hypothyroidism    MAG (obstructive sleep apnea)    Overactive bladder    Major depressive disorder    Sjogren's syndrome ([facility])    COPD (chronic obstructive pulmonary disease) ([facility])    Mixed hyperlipidemia    BMI 45.0-49.9, adult ([facility])    Anticoagulation management encounter    Mood disorder ([facility])    Substance abuse ([facility])    Cognitive impairment    Anxiety    Borderline personality disorder ([facility])    Platelets decreased ([facility])    Chronic eczematous otitis externa of both ears    Chronic migraine without aura without status migrainosus, not intractable    Bilateral leg edema    Cervicalgia    History of  "pulmonary embolism    Restless leg syndrome    Lymphedema    History of DVT (deep vein thrombosis)    Dyskinesia, drug-induced    Cervical spondylosis    Prediabetes       Allergies:     Allergies   Allergen Reactions    Percocet [Oxycodone-Acetaminophen] Headache     Severe headaches   (denies issues with Tylenol)    Povidone Iodine Rash     Unsure if betadine or gauze post surgical. Got rash on leg.   Has  Had itchy rashes after every surgery prep and IV insertion    Chlorhexidine Rash     Per pt \"able to use the liquid soap--thinkd reaction from the sponges or wipes\"       Current Medications:       Current Outpatient Medications:     Aciphex 20 MG tablet, Take by mouth 2 (two) times a day, Disp: , Rfl:     atoMOXetine (STRATTERA) 60 mg capsule, Take 60 mg by mouth daily, Disp: , Rfl:     atorvastatin (LIPITOR) 80 mg tablet, Take 1 tablet (80 mg total) by mouth daily, Disp: 90 tablet, Rfl: 3    Austedo XR 24 MG TB24, , Disp: , Rfl:     benzonatate (TESSALON PERLES) 100 mg capsule, Take 1 capsule (100 mg total) by mouth 3 (three) times a day as needed for cough, Disp: 20 capsule, Rfl: 0    benztropine (COGENTIN) 1 mg tablet, , Disp: , Rfl:     buPROPion (WELLBUTRIN XL) 150 mg 24 hr tablet, , Disp: , Rfl:     Cholecalciferol (Vitamin D3) 125 MCG (5000 UT) capsule, Take 1 capsule (5,000 Units total) by mouth daily, Disp: 90 capsule, Rfl: 1    cloNIDine (CATAPRES) 0.3 mg tablet, Take 1 tablet (0.3 mg total) by mouth 2 (two) times a day, Disp: 180 tablet, Rfl: 3    furosemide (LASIX) 20 mg tablet, TAKE 1 TABLET BY MOUTH DAILY, Disp: 90 tablet, Rfl: 1    levocetirizine (XYZAL) 5 MG tablet, TAKE ONE TABLET BY MOUTH IN THE EVENING DAILY, Disp: 90 tablet, Rfl: 1    levothyroxine 125 mcg tablet, TAKE 1 TABLET BY MOUTH DAILY IN THE EARLY MORNING, Disp: 90 tablet, Rfl: 1    losartan (COZAAR) 100 MG tablet, TAKE 1 TABLET BY MOUTH DAILY, Disp: 90 tablet, Rfl: 4    lurasidone (LATUDA) 40 mg tablet, , Disp: , Rfl:     metFORMIN " (GLUCOPHAGE-XR) 750 mg 24 hr tablet, TAKE 1 TABLET BY MOUTH DAILY WITH BREAKFAST, Disp: 90 tablet, Rfl: 1    naltrexone (REVIA) 50 mg tablet, TAKE 1 TABLET BY MOUTH DAILY, Disp: 90 tablet, Rfl: 4    naproxen (Naprosyn) 500 mg tablet, Take 1 tablet (500 mg total) by mouth 2 (two) times a day with meals Take with food., Disp: 30 tablet, Rfl: 0    OXcarbazepine (TRILEPTAL) 300 mg tablet, Take 300 mg by mouth every 12 (twelve) hours, Disp: , Rfl:     pantoprazole (PROTONIX) 40 mg tablet, Take 1 tablet (40 mg total) by mouth daily in the early morning, Disp: 90 tablet, Rfl: 1    pilocarpine (SALAGEN) 5 mg tablet, TAKE 1 TABLET (5 MG TOTAL) BY MOUTH THREE (THREE) TIMES A DAY, Disp: 270 tablet, Rfl: 4    rOPINIRole (REQUIP) 1 mg tablet, Take by mouth daily, Disp: , Rfl:     traZODone (DESYREL) 100 mg tablet, , Disp: , Rfl:     trospium chloride (SANCTURA) 20 mg tablet, Take 20 mg by mouth 2 (two) times a day, Disp: , Rfl:     warfarin (COUMADIN) 2 mg tablet, take 3 & 1/2 tablets (7MG) once a day Saturday and Sunday and take 2 & 1/2 tablets (5MG) once a day Monday through Friday, Disp: 90 tablet, Rfl: 0    cyanocobalamin (VITAMIN B-12) 1000 MCG tablet, Take 1 tablet (1,000 mcg total) by mouth daily, Disp: 90 tablet, Rfl: 1    OXcarbazepine (TRILEPTAL) 150 mg tablet, Take 3 tablets (450 mg total) by mouth every 12 (twelve) hours, Disp: 180 tablet, Rfl: 1    oxybutynin (DITROPAN) 5 mg tablet, Take 1 tablet (5 mg total) by mouth 2 (two) times a day, Disp: 120 tablet, Rfl: 1    topiramate (TOPAMAX) 200 MG tablet, Take 1 tablet (200 mg total) by mouth 2 (two) times a day, Disp: 60 tablet, Rfl: 1    warfarin (COUMADIN) 5 mg tablet, Take 1 tablet (5 mg total) by mouth daily (Patient not taking: Reported on 2/20/2025), Disp: 90 tablet, Rfl: 1    Past Medical History:       Past Medical History:   Diagnosis Date    Acute deep vein thrombosis of lower leg, left (HCC) 10/16/2023    Acute heart failure, unspecified heart failure type  "([facility]) 05/06/2024    Anxiety     Arthritis     oa cassandra knees    Asthma     good control- no medications    Yan's esophagus     Bipolar affective disorder (HCC)     Cannabis abuse with unspecified 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[facility])     Chronic pain disorder     lumbar    Contusion of elbow 12/21/2021    COPD (chronic obstructive pulmonary disease) ([facility])     CPAP (continuous positive airway pressure) dependence     DDD (degenerative disc disease), lumbar 04/09/2015    Degenerative disc disease at L5-S1 level     Deliberate self-cutting     9/15/22per pt has not done recently-- does see a therapist and psychiatrist regularly    Depression 09/16/2008    Diarrhea 01/06/2022    Disease of thyroid gland     hypo    MARTINEZ (dyspnea on exertion)     per pt \"has had this with exertion and not new\"    Drug overdose 10/28/2008    suicide attempt and again drug overdose 7/2022--[facility]-Medical floor and than transferred to [facility]    Dysphagia     Dyspnea     Ecchymosis 01/06/2022    Edema     BLE    Elevated troponin 09/02/2023    Family history of blood clots     GERD (gastroesophageal reflux disease)     Grief 11/11/2023    Headache(784.0)     Headache, tension-type     Hemorrhage of rectum and anus 10/02/2017    Formatting of this note might be different from the original.  Added automatically from request for surgery [id number]    High blood pressure     History of COVID-19 12/30/2020    Symptoms started on 12/30/2020 and then got worse.  Today she is feeling a little bit better.  She is a candidate for monoclonal antibody infusion and set for 1/6/21 @ 1pm at [facility]. 01/07/21 - telemedicine -     Hyperlipidemia     Hypertension     Ingrown toenail 12/02/2023    Intentional overdose ([facility]) 09/27/2023    Intentional self-harm by unspecified sharp object, sequela ([facility]) 02/09/2023    Knee pain, bilateral     Especially right    Knee pain, right 02/23/2022    Medial epicondylitis of elbow, left 04/03/2018    Formatting of this " note might be different from the original.  Added automatically from request for surgery [id number]    Medial epicondylitis of right elbow 07/17/2023    Medical marijuana use     Memory difficulties 08/06/2020    Memory loss     Migraines     Obesity     Other psychoactive substance abuse with unspecified psychoactive substance-induced disorder (HCC) 05/06/2024    Overactive bladder     Polysubstance abuse (HCC) 09/29/2023    Potential for cognitive impairment 06/17/2021    Primary osteoarthritis of both knees 08/08/2018    Pulmonary emboli (HCC)     Restrictive lung disease 02/01/2017    Last Assessment & Plan:   Formatting of this note might be different from the original.  I reviewed this problem throughout the rest of the note. Please refer to the other assessments for details.    Sjogren's disease (HCC)     Sleep apnea     Stress incontinence     Suicidal deliberate poisoning (HCC) 07/13/2022    Suicidal ideations     Teeth missing     Toxic encephalopathy 11/08/2023    Tremor     per pt Tremors of Right Leg and both Arms    Visual impairment     Wears glasses         Past Surgical History:   Procedure Laterality Date    BREAST CYST EXCISION Right 1989    CARPAL TUNNEL RELEASE Left     CHOLECYSTECTOMY  05/2003    Laparoscopic    COLONOSCOPY      01/12/2009    DILATION AND CURETTAGE OF UTERUS      ELBOW SURGERY Left     x2. No hardware    ESOPHAGOGASTRODUODENOSCOPY      FOOT SURGERY Left     Plantar fasciotomy    HERNIA REPAIR      HYSTERECTOMY      laporoscopic, partial    KNEE ARTHROSCOPY Bilateral     OOPHORECTOMY Left 10/2015    MN CORRJ HLX VLGS BNCTY [facility] JOINT ARTHRODESIS Right 8/2/2024    Procedure: RIGHT FOOT LAPIDUS BUNIONECTOMY, 2ND HAMMERTOE REPAIR, SECOND METATARSAL OSTEOTOMY WITH SECOND PLANTAR PLATE REPAIR;  Surgeon: Kaz Bautista DPM;  Location:  MAIN OR;  Service: Podiatry    MN GASTROCNEMIUS RECESSION Left 02/24/2021    Procedure: RECESSION GASTROC OPEN;  Surgeon: Nomi Yang  DPM;  Location:  MAIN OR;  Service: Podiatry    PA RPR UMBILICAL HRNA 5 YRS/> REDUCIBLE N/A 04/24/2019    Procedure: REPAIR HERNIA UMBILICAL LAPAROSCOPIC W/ ROBOTICS;  Surgeon: Anahi Colon MD;  Location: [facility] Main OR;  Service: General    PA SPHINCTEROTOMY ANAL DIVISION SPHINCTER SPX N/A 08/31/2018    Procedure: EUA, LEFT PARTIAL INTERNAL SPHINCTEROTOMY;  Surgeon: Tien Reyes MD;  Location: [facility] MAIN OR;  Service: Colorectal    PA TNOT ELBOW LATERAL/MEDIAL DEBRIDE OPEN TDN RPR Left 09/20/2022    Procedure: RELEASE EPICONDYLAR ELBOW MEDIAL;  Surgeon: Kyree Winkler MD;  [facility];  Service: Orthopedics    REDUCTION MAMMAPLASTY Bilateral 1999    REPAIR LACERATION Left     left hand-5/18/2009    REPLACEMENT TOTAL KNEE Right     ROTATOR CUFF REPAIR Left     TONSILECTOMY AND ADNOIDECTOMY      US GUIDED MSK PROCEDURE  04/22/2021    VASCULAR SURGERY      VEIN LIGATION Bilateral     WISDOM TOOTH EXTRACTION          Family History   Problem Relation Age of Onset    Kidney cancer Mother     Diabetes Mother     Depression Mother     Stroke Mother     Arthritis Mother     Cancer Mother     Psychiatric Illness Mother     No Known Problems Father     No Known Problems Sister     No Known Problems Maternal Grandmother     No Known Problems Maternal Grandfather     No Known Problems Paternal Grandmother     No Known Problems Paternal Grandfather     Colon cancer Maternal Uncle     Colon cancer Maternal Uncle     Colon cancer Paternal Uncle     Colon cancer Family     Alcohol abuse Sister     Asthma Sister         Social History     Socioeconomic History    Marital status: Single     Spouse name: Not on file    Number of children: Not on file    Years of education: Not on file    Highest education level: Not on file   Occupational History    Not on file   Tobacco Use    Smoking status: Former     Current packs/day: 0.00     Average packs/day: 2.0 packs/day for 33.0 years (66.0 ttl pk-yrs)     Types: Cigarettes  "    Start date: 1985     Quit date: 2018     Years since quittin.2     Passive exposure: Current    Smokeless tobacco: Never    Tobacco comments:     Started at age 16.   Vaping Use    Vaping status: Some Days    Substances: THC   Substance and Sexual Activity    Alcohol use: Not Currently     Comment: Recovering alcoholic    Drug use: Yes     Types: Marijuana     Comment: Former meth use, medicinal marijuana    Sexual activity: Not Currently     Partners: Male     Comment: defer   Other Topics Concern    Not on file   Social History Narrative    Not on file     Social Drivers of Health     Financial Resource Strain: Low Risk  (2024)    Overall Financial Resource Strain (CARDIA)     Difficulty of Paying Living Expenses: Not hard at all   Food Insecurity: No Food Insecurity (2024)    Nursing - Inadequate Food Risk Classification     Worried About Running Out of Food in the Last Year: Never true     Ran Out of Food in the Last Year: Never true     Ran Out of Food in the Last Year: Not on file   Transportation Needs: No Transportation Needs (2024)    PRAPARE - Transportation     Lack of Transportation (Medical): No     Lack of Transportation (Non-Medical): No   Physical Activity: Not on file   Stress: Not on file   Social Connections: Not on file   Intimate Partner Violence: Not on file   Housing Stability: Unknown (2024)    Housing Stability Vital Sign     Unable to Pay for Housing in the Last Year: No     Number of Times Moved in the Last Year: Not on file     Homeless in the Last Year: No        Physical Exam:     /90 (BP Location: Left arm, Patient Position: Sitting, Cuff Size: Large)   Pulse 93   Temp 98 °F (36.7 °C) (Temporal)   Ht 5' 6\" (1.676 m)   Wt 134 kg (295 lb 6.4 oz)   SpO2 97%   BMI 47.68 kg/m²     Physical Exam  Vitals and nursing note reviewed.   Constitutional:       Appearance: Normal appearance. She is normal weight.   HENT:      Head: Normocephalic.      " Right Ear: Tympanic membrane, ear canal and external ear normal. There is no impacted cerumen.      Left Ear: Tympanic membrane, ear canal and external ear normal. There is no impacted cerumen.      Nose: Nose normal.      Mouth/Throat:      Mouth: Mucous membranes are moist.      Pharynx: Oropharynx is clear.   Eyes:      Extraocular Movements: Extraocular movements intact.      Conjunctiva/sclera: Conjunctivae normal.      Pupils: Pupils are equal, round, and reactive to light.   Cardiovascular:      Rate and Rhythm: Normal rate and regular rhythm.      Pulses: Normal pulses.      Heart sounds: Normal heart sounds.   Pulmonary:      Effort: Pulmonary effort is normal.      Breath sounds: Normal breath sounds.   Abdominal:      General: Bowel sounds are normal. There is no distension.      Palpations: Abdomen is soft.      Tenderness: There is no abdominal tenderness.   Musculoskeletal:         General: Normal range of motion.      Cervical back: Normal range of motion.   Skin:     General: Skin is warm.      Capillary Refill: Capillary refill takes less than 2 seconds.   Neurological:      General: No focal deficit present.      Mental Status: She is alert and oriented to person, place, and time. Mental status is at baseline.   Psychiatric:         Mood and Affect: Mood normal.         Behavior: Behavior normal.         Thought Content: Thought content normal.         Judgment: Judgment normal.          Data:     Pre-operative work-up    Laboratory Results: I have personally reviewed the pertinent laboratory results/reports      EKG: Normal sinus rhythm, Normal ECG    Chest x-ray: No acute pulmonary pathology.     Previous cardiopulmonary studies within the past year:  Echocardiogram: n/a  Cardiac Catheterization: N/a  Stress Test: 03/05/25 No evidence of inducible ischemia   Pulmonary Function Testing: n/a      Assessment & Recommendations:     No diagnosis found.    Pre-Op Evaluation Assessment  54 y.o. female  with planned surgery: FOOT REMOVAL OF PAINFUL HARDWARE, REVISION OF SCAR (Right: Foot)   Known risk factors for perioperative complications: Coronary disease.        Current medications which may produce withdrawal symptoms if withheld perioperatively: none.    Pre-Op Evaluation Plan  1. Further preoperative workup as follows:   - Coagulation studies    2. Medication Management/Recommendations:   - Patient has been instructed to avoid herbs or non-directed vitamins the week prior to surgery to ensure no drug interactions with perioperative surgical and anesthetic medications.  - Patient should continue antihypertensive medications up through and including the day of surgery.   - Patient should continue his statin medication up through and including the day of surgery.  - Hold metformin the morning of surgery and do not resume until 48 hours AFTER surgery to avoid risk of lactic acidosis. Do not resume if eGFR is < 30  - Patient has been instructed to avoid aspirin containing medications or non-steroidal anti-inflammatory drugs for the week preceding surgery.  - Regarding anti-platelet agents: hematology appointment 05/01/25.    3. Prophylaxis for cardiac events with perioperative beta-blockers: not indicated.    4. Patient requires further consultation with: Hematology/Oncology    Clearance  Patient is CLEARED for surgery without any additional cardiac testing.     JHONATAN Armando  [address] [address]  Phone#  [phone]  Fax#  [phone]

## 2025-04-17 NOTE — H&P (VIEW-ONLY)
[facility] PRE-OPERATIVE EVALUATION  [facility] PHYSICIAN GROUP - [facility]    NAME: Blanca Mason  AGE: 54 y.o. SEX: female  : 1970     DATE: 2025    [facility] Pre-Operative Evaluation      Chief Complaint: Pre-operative Evaluation     Surgery: Foot removal of painful hardware, revision of scar (Right: Foot)   Anticipated Date of Surgery: 25   Referring Provider: Kaz Bautista DPM      History of Present Illness:     Blanca Mason is a 54 y.o. female who presents to the office today for a preoperative consultation at the request of surgeon, Kaz Bautista DPM , who plans on performing Foot removal of painful hardware, revision of scar (Right: Foot)  on 25 . Planned anesthesia is IV sedation and with Anesthesia . Patient has a bleeding risk of: recent abnormal bleeding (DVT/PE). Patient does not have objections to receiving blood products if needed. Current anti-platelet/anti-coagulation medications that the patient is prescribed includes: Warfarin (Coumadin or Jantoven).      Assessment of Chronic Conditions:   - Coronary Artery Disease: DVT/PE - follows with hematology  - Hypertension: well controlled on medications     Assessment of Cardiac Risk:  Denies unstable or severe angina or MI in the last 6 weeks or history of stent placement in the last year   Denies decompensated heart failure (e.g. New onset heart failure, NYHA functional class IV heart failure, or worsening existing heart failure)  Denies significant arrhythmias such as high grade AV block, symptomatic ventricular arrhythmia, newly recognized ventricular tachycardia, supraventricular tachycardia with resting heart rate >100, or symptomatic bradycardia  Denies severe heart valve disease including aortic stenosis or symptomatic mitral stenosis     Exercise Capacity:  Able to walk 4 blocks without symptoms?: Yes  Able to walk 2 flights without symptoms?:  Yes    Prior Anesthesia Reactions: No     Personal history of venous thromboembolic disease? Yes    History of steroid use for >2 weeks within last year? No         Review of Systems:     Review of Systems   Constitutional:  Negative for activity change, chills, fatigue and fever.   HENT:  Negative for congestion, ear pain, rhinorrhea, sore throat and trouble swallowing.    Eyes:  Negative for pain and visual disturbance.   Respiratory:  Negative for cough, chest tightness and shortness of breath.    Cardiovascular:  Negative for chest pain, palpitations and leg swelling.   Gastrointestinal:  Negative for abdominal pain, constipation, diarrhea, nausea and vomiting.   Genitourinary:  Negative for difficulty urinating, dysuria, hematuria and urgency.   Musculoskeletal:  Negative for arthralgias and back pain.   Skin:  Negative for color change and rash.   Neurological:  Negative for dizziness, seizures, syncope and headaches.   Psychiatric/Behavioral:  Negative for dysphoric mood. The patient is not nervous/anxious.    All other systems reviewed and are negative.      Current Problem List:     Patient Active Problem List   Diagnosis    Yan's esophagus determined by biopsy    Benign essential hypertension    Schizoaffective disorder, bipolar type ([facility])    Chronic low back pain    Family history of renal cancer    Hypothyroidism    MAG (obstructive sleep apnea)    Overactive bladder    Major depressive disorder    Sjogren's syndrome ([facility])    COPD (chronic obstructive pulmonary disease) ([facility])    Mixed hyperlipidemia    BMI 45.0-49.9, adult ([facility])    Anticoagulation management encounter    Mood disorder ([facility])    Substance abuse ([facility])    Cognitive impairment    Anxiety    Borderline personality disorder ([facility])    Platelets decreased ([facility])    Chronic eczematous otitis externa of both ears    Chronic migraine without aura without status migrainosus, not intractable    Bilateral leg edema    Cervicalgia    History of  "pulmonary embolism    Restless leg syndrome    Lymphedema    History of DVT (deep vein thrombosis)    Dyskinesia, drug-induced    Cervical spondylosis    Prediabetes       Allergies:     Allergies   Allergen Reactions    Percocet [Oxycodone-Acetaminophen] Headache     Severe headaches   (denies issues with Tylenol)    Povidone Iodine Rash     Unsure if betadine or gauze post surgical. Got rash on leg.   Has  Had itchy rashes after every surgery prep and IV insertion    Chlorhexidine Rash     Per pt \"able to use the liquid soap--thinkd reaction from the sponges or wipes\"       Current Medications:       Current Outpatient Medications:     Aciphex 20 MG tablet, Take by mouth 2 (two) times a day, Disp: , Rfl:     atoMOXetine (STRATTERA) 60 mg capsule, Take 60 mg by mouth daily, Disp: , Rfl:     atorvastatin (LIPITOR) 80 mg tablet, Take 1 tablet (80 mg total) by mouth daily, Disp: 90 tablet, Rfl: 3    Austedo XR 24 MG TB24, , Disp: , Rfl:     benzonatate (TESSALON PERLES) 100 mg capsule, Take 1 capsule (100 mg total) by mouth 3 (three) times a day as needed for cough, Disp: 20 capsule, Rfl: 0    benztropine (COGENTIN) 1 mg tablet, , Disp: , Rfl:     buPROPion (WELLBUTRIN XL) 150 mg 24 hr tablet, , Disp: , Rfl:     Cholecalciferol (Vitamin D3) 125 MCG (5000 UT) capsule, Take 1 capsule (5,000 Units total) by mouth daily, Disp: 90 capsule, Rfl: 1    cloNIDine (CATAPRES) 0.3 mg tablet, Take 1 tablet (0.3 mg total) by mouth 2 (two) times a day, Disp: 180 tablet, Rfl: 3    furosemide (LASIX) 20 mg tablet, TAKE 1 TABLET BY MOUTH DAILY, Disp: 90 tablet, Rfl: 1    levocetirizine (XYZAL) 5 MG tablet, TAKE ONE TABLET BY MOUTH IN THE EVENING DAILY, Disp: 90 tablet, Rfl: 1    levothyroxine 125 mcg tablet, TAKE 1 TABLET BY MOUTH DAILY IN THE EARLY MORNING, Disp: 90 tablet, Rfl: 1    losartan (COZAAR) 100 MG tablet, TAKE 1 TABLET BY MOUTH DAILY, Disp: 90 tablet, Rfl: 4    lurasidone (LATUDA) 40 mg tablet, , Disp: , Rfl:     metFORMIN " (GLUCOPHAGE-XR) 750 mg 24 hr tablet, TAKE 1 TABLET BY MOUTH DAILY WITH BREAKFAST, Disp: 90 tablet, Rfl: 1    naltrexone (REVIA) 50 mg tablet, TAKE 1 TABLET BY MOUTH DAILY, Disp: 90 tablet, Rfl: 4    naproxen (Naprosyn) 500 mg tablet, Take 1 tablet (500 mg total) by mouth 2 (two) times a day with meals Take with food., Disp: 30 tablet, Rfl: 0    OXcarbazepine (TRILEPTAL) 300 mg tablet, Take 300 mg by mouth every 12 (twelve) hours, Disp: , Rfl:     pantoprazole (PROTONIX) 40 mg tablet, Take 1 tablet (40 mg total) by mouth daily in the early morning, Disp: 90 tablet, Rfl: 1    pilocarpine (SALAGEN) 5 mg tablet, TAKE 1 TABLET (5 MG TOTAL) BY MOUTH THREE (THREE) TIMES A DAY, Disp: 270 tablet, Rfl: 4    rOPINIRole (REQUIP) 1 mg tablet, Take by mouth daily, Disp: , Rfl:     traZODone (DESYREL) 100 mg tablet, , Disp: , Rfl:     trospium chloride (SANCTURA) 20 mg tablet, Take 20 mg by mouth 2 (two) times a day, Disp: , Rfl:     warfarin (COUMADIN) 2 mg tablet, take 3 & 1/2 tablets (7MG) once a day Saturday and Sunday and take 2 & 1/2 tablets (5MG) once a day Monday through Friday, Disp: 90 tablet, Rfl: 0    cyanocobalamin (VITAMIN B-12) 1000 MCG tablet, Take 1 tablet (1,000 mcg total) by mouth daily, Disp: 90 tablet, Rfl: 1    OXcarbazepine (TRILEPTAL) 150 mg tablet, Take 3 tablets (450 mg total) by mouth every 12 (twelve) hours, Disp: 180 tablet, Rfl: 1    oxybutynin (DITROPAN) 5 mg tablet, Take 1 tablet (5 mg total) by mouth 2 (two) times a day, Disp: 120 tablet, Rfl: 1    topiramate (TOPAMAX) 200 MG tablet, Take 1 tablet (200 mg total) by mouth 2 (two) times a day, Disp: 60 tablet, Rfl: 1    warfarin (COUMADIN) 5 mg tablet, Take 1 tablet (5 mg total) by mouth daily (Patient not taking: Reported on 2/20/2025), Disp: 90 tablet, Rfl: 1    Past Medical History:       Past Medical History:   Diagnosis Date    Acute deep vein thrombosis of lower leg, left (HCC) 10/16/2023    Acute heart failure, unspecified heart failure type  "([facility]) 05/06/2024    Anxiety     Arthritis     oa cassandra knees    Asthma     good control- no medications    Yan's esophagus     Bipolar affective disorder (HCC)     Cannabis abuse with unspecified 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[facility])     Chronic pain disorder     lumbar    Contusion of elbow 12/21/2021    COPD (chronic obstructive pulmonary disease) ([facility])     CPAP (continuous positive airway pressure) dependence     DDD (degenerative disc disease), lumbar 04/09/2015    Degenerative disc disease at L5-S1 level     Deliberate self-cutting     9/15/22per pt has not done recently-- does see a therapist and psychiatrist regularly    Depression 09/16/2008    Diarrhea 01/06/2022    Disease of thyroid gland     hypo    MARTINEZ (dyspnea on exertion)     per pt \"has had this with exertion and not new\"    Drug overdose 10/28/2008    suicide attempt and again drug overdose 7/2022--[facility]Medical floor and than transferred to [facility]    Dysphagia     Dyspnea     Ecchymosis 01/06/2022    Edema     BLE    Elevated troponin 09/02/2023    Family history of blood clots     GERD (gastroesophageal reflux disease)     Grief 11/11/2023    Headache(784.0)     Headache, tension-type     Hemorrhage of rectum and anus 10/02/2017    Formatting of this note might be different from the original.  Added automatically from request for surgery [id number]    High blood pressure     History of COVID-19 12/30/2020    Symptoms started on 12/30/2020 and then got worse.  Today she is feeling a little bit better.  She is a candidate for monoclonal antibody infusion and set for 1/6/21 @ 1pm at [facility]. 01/07/21 - telemedicine -     Hyperlipidemia     Hypertension     Ingrown toenail 12/02/2023    Intentional overdose ([facility]) 09/27/2023    Intentional self-harm by unspecified sharp object, sequela ([facility]) 02/09/2023    Knee pain, bilateral     Especially right    Knee pain, right 02/23/2022    Medial epicondylitis of elbow, left 04/03/2018    Formatting of this " note might be different from the original.  Added automatically from request for surgery [id number]    Medial epicondylitis of right elbow 07/17/2023    Medical marijuana use     Memory difficulties 08/06/2020    Memory loss     Migraines     Obesity     Other psychoactive substance abuse with unspecified psychoactive substance-induced disorder (HCC) 05/06/2024    Overactive bladder     Polysubstance abuse (HCC) 09/29/2023    Potential for cognitive impairment 06/17/2021    Primary osteoarthritis of both knees 08/08/2018    Pulmonary emboli (HCC)     Restrictive lung disease 02/01/2017    Last Assessment & Plan:   Formatting of this note might be different from the original.  I reviewed this problem throughout the rest of the note. Please refer to the other assessments for details.    Sjogren's disease (HCC)     Sleep apnea     Stress incontinence     Suicidal deliberate poisoning (HCC) 07/13/2022    Suicidal ideations     Teeth missing     Toxic encephalopathy 11/08/2023    Tremor     per pt Tremors of Right Leg and both Arms    Visual impairment     Wears glasses         Past Surgical History:   Procedure Laterality Date    BREAST CYST EXCISION Right 1989    CARPAL TUNNEL RELEASE Left     CHOLECYSTECTOMY  05/2003    Laparoscopic    COLONOSCOPY      01/12/2009    DILATION AND CURETTAGE OF UTERUS      ELBOW SURGERY Left     x2. No hardware    ESOPHAGOGASTRODUODENOSCOPY      FOOT SURGERY Left     Plantar fasciotomy    HERNIA REPAIR      HYSTERECTOMY      laporoscopic, partial    KNEE ARTHROSCOPY Bilateral     OOPHORECTOMY Left 10/2015    WY CORRJ HLX VLGS BNCTY [facility] JOINT ARTHRODESIS Right 8/2/2024    Procedure: RIGHT FOOT LAPIDUS BUNIONECTOMY, 2ND HAMMERTOE REPAIR, SECOND METATARSAL OSTEOTOMY WITH SECOND PLANTAR PLATE REPAIR;  Surgeon: Kaz Bautista DPM;  Location:  MAIN OR;  Service: Podiatry    WY GASTROCNEMIUS RECESSION Left 02/24/2021    Procedure: RECESSION GASTROC OPEN;  Surgeon: Nomi Yang  DPM;  Location:  MAIN OR;  Service: Podiatry    MD RPR UMBILICAL HRNA 5 YRS/> REDUCIBLE N/A 04/24/2019    Procedure: REPAIR HERNIA UMBILICAL LAPAROSCOPIC W/ ROBOTICS;  Surgeon: Anahi Colon MD;  Location: [facility] Main OR;  Service: General    MD SPHINCTEROTOMY ANAL DIVISION SPHINCTER SPX N/A 08/31/2018    Procedure: EUA, LEFT PARTIAL INTERNAL SPHINCTEROTOMY;  Surgeon: Tien Reyes MD;  Location: [facility] MAIN OR;  Service: Colorectal    MD TNOT ELBOW LATERAL/MEDIAL DEBRIDE OPEN TDN RPR Left 09/20/2022    Procedure: RELEASE EPICONDYLAR ELBOW MEDIAL;  Surgeon: Kyree Winkler MD;  [facility];  Service: Orthopedics    REDUCTION MAMMAPLASTY Bilateral 1999    REPAIR LACERATION Left     left hand-5/18/2009    REPLACEMENT TOTAL KNEE Right     ROTATOR CUFF REPAIR Left     TONSILECTOMY AND ADNOIDECTOMY      US GUIDED MSK PROCEDURE  04/22/2021    VASCULAR SURGERY      VEIN LIGATION Bilateral     WISDOM TOOTH EXTRACTION          Family History   Problem Relation Age of Onset    Kidney cancer Mother     Diabetes Mother     Depression Mother     Stroke Mother     Arthritis Mother     Cancer Mother     Psychiatric Illness Mother     No Known Problems Father     No Known Problems Sister     No Known Problems Maternal Grandmother     No Known Problems Maternal Grandfather     No Known Problems Paternal Grandmother     No Known Problems Paternal Grandfather     Colon cancer Maternal Uncle     Colon cancer Maternal Uncle     Colon cancer Paternal Uncle     Colon cancer Family     Alcohol abuse Sister     Asthma Sister         Social History     Socioeconomic History    Marital status: Single     Spouse name: Not on file    Number of children: Not on file    Years of education: Not on file    Highest education level: Not on file   Occupational History    Not on file   Tobacco Use    Smoking status: Former     Current packs/day: 0.00     Average packs/day: 2.0 packs/day for 33.0 years (66.0 ttl pk-yrs)     Types: Cigarettes  "    Start date: 1985     Quit date: 2018     Years since quittin.2     Passive exposure: Current    Smokeless tobacco: Never    Tobacco comments:     Started at age 16.   Vaping Use    Vaping status: Some Days    Substances: THC   Substance and Sexual Activity    Alcohol use: Not Currently     Comment: Recovering alcoholic    Drug use: Yes     Types: Marijuana     Comment: Former meth use, medicinal marijuana    Sexual activity: Not Currently     Partners: Male     Comment: defer   Other Topics Concern    Not on file   Social History Narrative    Not on file     Social Drivers of Health     Financial Resource Strain: Low Risk  (2024)    Overall Financial Resource Strain (CARDIA)     Difficulty of Paying Living Expenses: Not hard at all   Food Insecurity: No Food Insecurity (2024)    Nursing - Inadequate Food Risk Classification     Worried About Running Out of Food in the Last Year: Never true     Ran Out of Food in the Last Year: Never true     Ran Out of Food in the Last Year: Not on file   Transportation Needs: No Transportation Needs (2024)    PRAPARE - Transportation     Lack of Transportation (Medical): No     Lack of Transportation (Non-Medical): No   Physical Activity: Not on file   Stress: Not on file   Social Connections: Not on file   Intimate Partner Violence: Not on file   Housing Stability: Unknown (2024)    Housing Stability Vital Sign     Unable to Pay for Housing in the Last Year: No     Number of Times Moved in the Last Year: Not on file     Homeless in the Last Year: No        Physical Exam:     /90 (BP Location: Left arm, Patient Position: Sitting, Cuff Size: Large)   Pulse 93   Temp 98 °F (36.7 °C) (Temporal)   Ht 5' 6\" (1.676 m)   Wt 134 kg (295 lb 6.4 oz)   SpO2 97%   BMI 47.68 kg/m²     Physical Exam  Vitals and nursing note reviewed.   Constitutional:       Appearance: Normal appearance. She is normal weight.   HENT:      Head: Normocephalic.      " Right Ear: Tympanic membrane, ear canal and external ear normal. There is no impacted cerumen.      Left Ear: Tympanic membrane, ear canal and external ear normal. There is no impacted cerumen.      Nose: Nose normal.      Mouth/Throat:      Mouth: Mucous membranes are moist.      Pharynx: Oropharynx is clear.   Eyes:      Extraocular Movements: Extraocular movements intact.      Conjunctiva/sclera: Conjunctivae normal.      Pupils: Pupils are equal, round, and reactive to light.   Cardiovascular:      Rate and Rhythm: Normal rate and regular rhythm.      Pulses: Normal pulses.      Heart sounds: Normal heart sounds.   Pulmonary:      Effort: Pulmonary effort is normal.      Breath sounds: Normal breath sounds.   Abdominal:      General: Bowel sounds are normal. There is no distension.      Palpations: Abdomen is soft.      Tenderness: There is no abdominal tenderness.   Musculoskeletal:         General: Normal range of motion.      Cervical back: Normal range of motion.   Skin:     General: Skin is warm.      Capillary Refill: Capillary refill takes less than 2 seconds.   Neurological:      General: No focal deficit present.      Mental Status: She is alert and oriented to person, place, and time. Mental status is at baseline.   Psychiatric:         Mood and Affect: Mood normal.         Behavior: Behavior normal.         Thought Content: Thought content normal.         Judgment: Judgment normal.          Data:     Pre-operative work-up    Laboratory Results: I have personally reviewed the pertinent laboratory results/reports      EKG: Normal sinus rhythm, Normal ECG    Chest x-ray: No acute pulmonary pathology.     Previous cardiopulmonary studies within the past year:  Echocardiogram: n/a  Cardiac Catheterization: N/a  Stress Test: 03/05/25 No evidence of inducible ischemia   Pulmonary Function Testing: n/a      Assessment & Recommendations:     No diagnosis found.    Pre-Op Evaluation Assessment  54 y.o. female  with planned surgery: FOOT REMOVAL OF PAINFUL HARDWARE, REVISION OF SCAR (Right: Foot)   Known risk factors for perioperative complications: Coronary disease.        Current medications which may produce withdrawal symptoms if withheld perioperatively: none.    Pre-Op Evaluation Plan  1. Further preoperative workup as follows:   - Coagulation studies    2. Medication Management/Recommendations:   - Patient has been instructed to avoid herbs or non-directed vitamins the week prior to surgery to ensure no drug interactions with perioperative surgical and anesthetic medications.  - Patient should continue antihypertensive medications up through and including the day of surgery.   - Patient should continue his statin medication up through and including the day of surgery.  - Hold metformin the morning of surgery and do not resume until 48 hours AFTER surgery to avoid risk of lactic acidosis. Do not resume if eGFR is < 30  - Patient has been instructed to avoid aspirin containing medications or non-steroidal anti-inflammatory drugs for the week preceding surgery.  - Regarding anti-platelet agents: hematology appointment 05/01/25.    3. Prophylaxis for cardiac events with perioperative beta-blockers: not indicated.    4. Patient requires further consultation with: Hematology/Oncology    Clearance  Patient is CLEARED for surgery without any additional cardiac testing.     JHONATAN Armando  [address] [address]  Phone#  [phone]  Fax#  [phone]

## 2025-04-21 ENCOUNTER — APPOINTMENT (OUTPATIENT)
Dept: LAB | Facility: CLINIC | Age: 55
End: 2025-04-21
Payer: COMMERCIAL

## 2025-04-21 ENCOUNTER — TELEPHONE (OUTPATIENT)
Dept: HEMATOLOGY ONCOLOGY | Facility: CLINIC | Age: 55
End: 2025-04-21

## 2025-04-21 DIAGNOSIS — Z79.01 ANTICOAGULATION MANAGEMENT ENCOUNTER: ICD-10-CM

## 2025-04-21 DIAGNOSIS — Z51.81 ANTICOAGULATION MANAGEMENT ENCOUNTER: ICD-10-CM

## 2025-04-21 DIAGNOSIS — Z86.718 HISTORY OF DVT (DEEP VEIN THROMBOSIS): ICD-10-CM

## 2025-04-21 LAB
INR PPP: 2.77 (ref 0.85–1.19)
PROTHROMBIN TIME: 29.1 SECONDS (ref 12.3–15)

## 2025-04-21 PROCEDURE — 85610 PROTHROMBIN TIME: CPT

## 2025-04-21 PROCEDURE — 36415 COLL VENOUS BLD VENIPUNCTURE: CPT

## 2025-04-21 NOTE — TELEPHONE ENCOUNTER
Inform patient that her INR is 2.77, therapeutic.  Continue taking Coumadin 5 mg Mondays through Fridays and 2 mg Saturdays and Sundays.  We will recheck her INR next week.  Patient voiced understanding.

## 2025-04-22 ENCOUNTER — DOCUMENTATION (OUTPATIENT)
Dept: CASE MANAGEMENT | Facility: HOSPITAL | Age: 55
End: 2025-04-22

## 2025-04-22 NOTE — BEHAVIORAL HEALTH HIGH UTILIZER
Patient Name:Blanca Mason MRN:  [id number]         : 1970     Age: 54 y.o.    Sex: female   Utilization History:  (# of ED visits & IP admits; reasons)  Pt had 4 [facility]-ED visits from 2024- 2025. ED presentations were related to SI with no plan or with a plan to overdose on medications. Pt intentionally overdosed on Flexeril twice and Trazodone twice. Pt reports periodic non-compliance with her medications.       Medical presentations were related to chest pain, right knee pain, withdrawing from meth, asthma exacerbation, fecal impaction, abdominal pain, toe or leg pain, dyspnea, SOB, bilateral leg edema, acute Saddle PE, acute respiratory failure, left shoulder pain, elevated INR, cellulitis of right lower extremity, right foot pain.     Treatment Recommendations & Presentation:  Presentation in the ED: Pt typically presents self to ED's. ED visits were related to SI with no plan or with a plan to overdose on medications. Pt intentionally overdosed on Flexeril twice and Trazodone twice. Pt reports periodic non-compliance with her medications.       Medical visits were related to chest pain, right knee pain, withdrawing from meth, asthma exacerbation, fecal impaction, abdominal pain, toe or leg pain, dyspnea, SOB, bilateral leg edema, acute Saddle PE, acute respiratory failure, elevated INR, cellulitis right lower extremity, right foot pain.              ED Recommendations: Following medical evaluation and treatment, establish acute risk versus pt's chronic behavioral disturbances. Please contact pt's [facility] ACT team to develop a safe discharge plan for pt to return home at [phone]. Pt should be contacting her ACT team when in Crisis and formulating an action plan to include distress tolerance coping skills.   Pt should continue her medical treatment with her multiple medical speciality providers.        Home Medication Regimen:  see most recent documentation in Epic, you can also verify  pt's medications with her ACT team    Recent Medical Work Ups:  (include Psych testing or ECT)     Labs -   Various Xrays, CT's, ultrasound -   ECG, stress test -   Right foot and toe surgery 2024 Inpatient Recommendations: Collaborate with pt's community resources to develop a comprehensive discharge plan.           Outpatient recommendations: Pt should continue her medical and mental health care with her community providers and take her medications as prescribed.      Situation/Relevant Background Info:    Pt is a 54 year old female with a diagnosis of Schizoaffective Disorder/ Bipolar type, Borderline Personality Disorder and a past history of meth abuse (clean since 2023 but uses medical marijuana) and numerous behavioral health hospitalizations.     Pt lives alone in an apartment that is close to where her father resides and has the [facility] ACT team. Pt's mother  a few years ago in  and pt reports this as a difficult issue for her.   Pt has experienced various medical issues in  and appears to be very preoccupied with her medical conditions, perceived medical issues and treatment and becomes frustrated if she feels she is not obtaining the medical care she needs in a timely manner. Pt has displayed demanding and agitated behaviors while on behavioral health units.  Pt was active in going to see her father in the community and helping him as well as working part-time approx 21 hours per week. It is unknown if pt is still working. Pt appears to have poor distress tolerance and impulsivity. Pt appears to want to control her discharge date from inpatient units.  Pt has multiple medical speciality providers in addition to her PCP office: [facility], [facility], [facility],  [facility], [facility],  [facility] Therapy,  [facility].  Pt had right foot bunionectomy and hammertoe surgery 2024.       Diagnoses/Significant Problems (Medical &  Psychiatric):  Schizoaffective disorder, bipolar type, Borderline Personality Disorder  Active Problems:    Benign essential hypertension    Edema    Hypothyroidism    MAG    Overactive bladder    Sjogren's syndrome (HCC)    COPD (chronic obstructive pulmonary disease) (HCC)    Medical clearance for psychiatric admission    History of pulmonary embolism    Ingrown toenail              Drivers of repeated utilization:   preoccupation with medical concerns and issues, personality disordered behaviors, limited to poor coping skills, impulsivity, secondary gains from ED visits and hospitalizations.                                                Existing Community Resources & Supports:                 father Luigi Mason - [phone]     Patient Medical & Psychiatric Care Team:  [facility] PCP - [phone]   [facility]  [facility]  [facility]  [facility]  [facility]  [facility]      Care plan date: 04/22/25                   Author:  Cherie Nieves RN                 Date reviewed with patient:

## 2025-04-28 ENCOUNTER — APPOINTMENT (OUTPATIENT)
Dept: LAB | Facility: CLINIC | Age: 55
End: 2025-04-28
Payer: COMMERCIAL

## 2025-04-28 ENCOUNTER — TELEPHONE (OUTPATIENT)
Dept: HEMATOLOGY ONCOLOGY | Facility: CLINIC | Age: 55
End: 2025-04-28

## 2025-04-28 NOTE — TELEPHONE ENCOUNTER
Spoke with patient to review her INR, which is therapeutic at 3.0.  Recommend she continue taking the same dose of Coumadin which is 5 mg Mondays through Fridays and 2 mg Saturdays and Sundays.  Recheck INR next week.  She will see me in the office on Thursday to discuss her upcoming surgery and Coumadin instructions.

## 2025-05-01 ENCOUNTER — TELEPHONE (OUTPATIENT)
Dept: HEMATOLOGY ONCOLOGY | Facility: CLINIC | Age: 55
End: 2025-05-01

## 2025-05-02 ENCOUNTER — OFFICE VISIT (OUTPATIENT)
Dept: HEMATOLOGY ONCOLOGY | Facility: CLINIC | Age: 55
End: 2025-05-02
Payer: COMMERCIAL

## 2025-05-02 VITALS
HEART RATE: 92 BPM | OXYGEN SATURATION: 96 % | SYSTOLIC BLOOD PRESSURE: 148 MMHG | RESPIRATION RATE: 17 BRPM | WEIGHT: 293 LBS | DIASTOLIC BLOOD PRESSURE: 96 MMHG | HEIGHT: 66 IN | TEMPERATURE: 97.7 F | BODY MASS INDEX: 47.09 KG/M2

## 2025-05-02 DIAGNOSIS — I82.4Z2 ACUTE DEEP VEIN THROMBOSIS OF LOWER LEG, LEFT (HCC): ICD-10-CM

## 2025-05-02 DIAGNOSIS — Z79.01 ANTICOAGULATION MANAGEMENT ENCOUNTER: Primary | ICD-10-CM

## 2025-05-02 DIAGNOSIS — Z51.81 ANTICOAGULATION MANAGEMENT ENCOUNTER: Primary | ICD-10-CM

## 2025-05-02 DIAGNOSIS — I26.92 ACUTE SADDLE PULMONARY EMBOLISM, UNSPECIFIED WHETHER ACUTE COR PULMONALE PRESENT (HCC): ICD-10-CM

## 2025-05-02 DIAGNOSIS — Z86.711 HISTORY OF PULMONARY EMBOLISM: ICD-10-CM

## 2025-05-02 PROCEDURE — 99214 OFFICE O/P EST MOD 30 MIN: CPT

## 2025-05-02 RX ORDER — ENOXAPARIN SODIUM 150 MG/ML
1 INJECTION SUBCUTANEOUS EVERY 12 HOURS
Qty: 20 ML | Refills: 0 | Status: SHIPPED | OUTPATIENT
Start: 2025-05-02

## 2025-05-02 RX ORDER — BUPROPION HYDROCHLORIDE 300 MG/1
TABLET ORAL
COMMUNITY
Start: 2025-04-14

## 2025-05-02 NOTE — ASSESSMENT & PLAN NOTE
Orders:    enoxaparin (LOVENOX) 150 mg/mL injection; Inject 0.9 mL (135 mg total) under the skin every 12 (twelve) hours

## 2025-05-02 NOTE — PROGRESS NOTES
Name: Blanca Mason      : 1970      MRN: [id number]  Encounter Provider: JHONATAN Lee  Encounter Date: 2025   Encounter department: [facility] [place]  :  Assessment & Plan  Anticoagulation management encounter    Orders:    enoxaparin (LOVENOX) 150 mg/mL injection; Inject 0.9 mL (135 mg total) under the skin every 12 (twelve) hours    History of pulmonary embolism    Orders:    enoxaparin (LOVENOX) 150 mg/mL injection; Inject 0.9 mL (135 mg total) under the skin every 12 (twelve) hours    Acute saddle pulmonary embolism, unspecified whether acute cor pulmonale present (HCC)    Orders:    enoxaparin (LOVENOX) 150 mg/mL injection; Inject 0.9 mL (135 mg total) under the skin every 12 (twelve) hours    Acute deep vein thrombosis of lower leg, left (HCC)    Orders:    enoxaparin (LOVENOX) 150 mg/mL injection; Inject 0.9 mL (135 mg total) under the skin every 12 (twelve) hours    53-year-old female with history of DVT/PE on Coumadin indefinitely.  We reviewed the instructions for surgery:  Patient will stop Coumadin 5 days prior to surgery  She will resume anticoagulation per her surgeon's discretion when homeostasis is achieved  When started on Coumadin by her surgeon, patient will restart at her current dose depending on the day/dose  We will bridge with Lovenox 1 mg/kg twice daily until INR therapeutic between 2 and 3  She will call me when she starts the anticoagulation so I can give her instructions on when to obtain her INR  Reviewed how to give oneself subcu injection, patient feels comfortable giving herself an injection.    These instructions were placed on her AVS and highlighted.  Advised patient to contact us should have have questions on her instructions.     Will see patient back in the office in 1 year.  I will be talking to her weekly to make adjustments to her Coumadin if needed.  She is agreeable with the plan.  Patient aware to contact us for any  additional questions/concerns or worsening symptoms.    Return in about 1 year (around 5/2/2026) for PRINT AVS.    History of Present Illness   Chief Complaint   Patient presents with    Follow-up     Pertinent Medical History     05/02/25: Patient presents to the office for Coumadin management for saddle PE on 9/1/2023 and left lower extremity DVT. Patient has a PMH significant for suicidal ideations and attempts, major depressive disorder, hypothyroidism, Yan's esophagus, GERD, COPD, hypertension, chronic migraines, osteoarthritis, tremors, schizoaffective disorder bipolar type. Patient has a long history of substance abuse, reports she has been clean since her last office visit.     Patient is on coumadin due to interaction with there psychiatric medication.   Patient denies any increased bruising. Patient denies abnormal bleeding: epistaxis, gingival bleeding, hematuria, dark tarry stools.  No fevers, increased fatigue, frequent infections, drenching night sweats, decreased appetite or unintentional weight loss.     We have made several adjustments since November to her Coumadin dose to maintain an INR between 2 and 3.  She is currently on 5 mg Coumadin Mondays through Fridays and 2 mg Saturdays and Sundays with weekly PT/INR checks.     Component      Latest Ref Rng 4/3/2025 4/8/2025 4/14/2025 4/21/2025 4/28/2025   PROTIME      12.3 - 15.0 seconds 44.1 (H)  15.0  24.9 (H)  29.1 (H)  30.9 (H)    POCT INR      0.85 - 1.19  4.83 (H)  1.15  2.26 (H)  2.77 (H)  3.00 (H)       Legend:  (H) High    Patient is scheduled to have foot surgery on 5/14/2025.  Previously we have bridged her with Lovenox 1mg/kg.           Review of Systems   Constitutional:  Negative for activity change, appetite change, diaphoresis, fatigue, fever and unexpected weight change.   HENT:  Negative for nosebleeds.    Eyes: Negative.    Respiratory:  Negative for cough, chest tightness and shortness of breath.    Cardiovascular:  Negative  "for chest pain, palpitations and leg swelling.   Gastrointestinal:  Negative for abdominal pain and blood in stool.   Endocrine: Negative.    Genitourinary:  Negative for hematuria.   Musculoskeletal: Negative.    Skin: Negative.    Neurological:  Negative for dizziness, light-headedness and headaches.   Hematological: Negative.            Objective   /96 (BP Location: Left arm, Patient Position: Sitting, Cuff Size: Adult)   Pulse 92   Temp 97.7 °F (36.5 °C) (Temporal)   Resp 17   Ht 5' 6\" (1.676 m)   Wt 134 kg (294 lb 12.8 oz)   SpO2 96%   BMI 47.58 kg/m²     Physical Exam  Constitutional:       Appearance: Normal appearance.   HENT:      Head: Normocephalic and atraumatic.   Cardiovascular:      Rate and Rhythm: Regular rhythm.      Heart sounds: Normal heart sounds.   Pulmonary:      Breath sounds: Normal breath sounds.   Musculoskeletal:      Cervical back: Normal range of motion.      Right lower leg: Edema present.      Left lower leg: Edema present.   Skin:     General: Skin is warm and dry.   Neurological:      Mental Status: She is alert and oriented to person, place, and time.   Psychiatric:         Mood and Affect: Mood normal.         Behavior: Behavior normal.         Thought Content: Thought content normal.         Judgment: Judgment normal.         Labs: I have reviewed the following labs:  Results for orders placed or performed in visit on 04/21/25   Protime-INR   Result Value Ref Range    Protime 29.1 (H) 12.3 - 15.0 seconds    INR 2.77 (H) 0.85 - 1.19     *Note: Due to a large number of results and/or encounters for the requested time period, some results have not been displayed. A complete set of results can be found in Results Review.   I spent 30 minutes in chart review, counseling, coordination of care, and documentation.    This note has been generated by voice recognition software system.  Therefore, there may be spelling, grammar, and or syntax errors. Please contact if " questions arise.

## 2025-05-02 NOTE — PATIENT INSTRUCTIONS
Instructions for Coumadin prior to foot surgery:    Surgery date 5/14/2025  STOP COUMADIN 5 days before surgery  Last dose of coumadin on 5/8/2025  Restart coumadin when your surgeon says to restart Coumadin at 5 mg for 5 days and 2 mg for 2 days daily  The day you start coumadin after surgery, you will start Lovenox injections 2 times a day, 12 hours apart  Call me (Heather) at [phone], the day you restart coumadin and start lovenox injections  I will give you instructions on when to get your blood test (INR) checked

## 2025-05-05 RX ORDER — ALBUTEROL SULFATE 90 UG/1
2 INHALANT RESPIRATORY (INHALATION) EVERY 6 HOURS PRN
COMMUNITY

## 2025-05-05 NOTE — PRE-PROCEDURE INSTRUCTIONS
Pre-Surgery Instructions:   Medication Instructions    albuterol (PROVENTIL HFA,VENTOLIN HFA) 90 mcg/act inhaler Uses PRN- OK to take day of surgery    atoMOXetine (STRATTERA) 60 mg capsule Hold day of surgery.    atorvastatin (LIPITOR) 80 mg tablet Take day of surgery.    Austedo XR 24 MG TB24 5/5/25 msg to anesthesia for instructions    benztropine (COGENTIN) 1 mg tablet Take day of surgery.    buPROPion (WELLBUTRIN XL) 150 mg 24 hr tablet Take day of surgery.    buPROPion (WELLBUTRIN XL) 300 mg 24 hr tablet Take day of surgery.    Cholecalciferol (Vitamin D3) 125 MCG (5000 UT) capsule Stop taking 7 days prior to surgery.    cloNIDine (CATAPRES) 0.3 mg tablet Take day of surgery.    enoxaparin (LOVENOX) 150 mg/mL injection Instructions provided by [facility] pt received written instructions from hematology    furosemide (LASIX) 20 mg tablet Hold day of surgery.    levocetirizine (XYZAL) 5 MG tablet Take night before surgery    levothyroxine 125 mcg tablet Take day of surgery.    losartan (COZAAR) 100 MG tablet Hold day of surgery.    metFORMIN (GLUCOPHAGE-XR) 750 mg 24 hr tablet Hold day of surgery.    naltrexone (REVIA) 50 mg tablet Pt instructed hold 3 days including day of surgery-last dose 5/11/25    OXcarbazepine (TRILEPTAL) 150 mg tablet Take day of surgery.    oxybutynin (DITROPAN) 5 mg tablet Hold day of surgery.    pantoprazole (PROTONIX) 40 mg tablet Take day of surgery.    pilocarpine (SALAGEN) 5 mg tablet Hold day of surgery.    topiramate (TOPAMAX) 200 MG tablet Take day of surgery.    trospium chloride (SANCTURA) 20 mg tablet Hold day of surgery.    warfarin (COUMADIN) 2 mg tablet Instructions provided by [facility] instructed per hematology hold 5 days before surgery-last dose 5/8/25    Medication instructions for day of surgery reviewed. Please take all instructed medications with only a sip of water.       You will receive a call one business day prior to surgery with an arrival time and hospital directions.  If your surgery is scheduled on a Monday, the hospital will be calling you on the Friday prior to your surgery. If you have not heard from anyone by 8pm, please call the hospital supervisor through the hospital  at [phone]. (Sincere [phone] or Leonard [phone]).    Do not eat or drink anything after midnight the night before your surgery, including candy, mints, lifesavers, or chewing gum. Do not drink alcohol 24hrs before your surgery. Try not to smoke at least 24hrs before your surgery.       Follow the pre surgery showering instructions as listed in the “My Surgical Experience Booklet” or otherwise provided by your surgeon's office. Do not use a blade to shave the surgical area 1 week before surgery. It is okay to use a clean electric clippers up to 24 hours before surgery. Do not apply any lotions, creams, including makeup, cologne, deodorant, or perfumes after showering on the day of your surgery. Do not use dry shampoo, hair spray, hair gel, or any type of hair products.     No contact lenses, eye make-up, or artificial eyelashes. Remove nail polish, including gel polish, and any artificial, gel, or acrylic nails if possible. Remove all jewelry including rings and body piercing jewelry.     Wear causal clothing that is easy to take on and off. Consider your type of surgery.    Keep any valuables, jewelry, piercings at home. Please bring any specially ordered equipment (sling, braces) if indicated.    Arrange for a responsible person to drive you to and from the hospital on the day of your surgery. Please confirm the visitor policy for the day of your procedure when you receive your phone call with an arrival time.     Call the surgeon's office with any new illnesses, exposures, or additional questions prior to surgery.    Please reference your “My Surgical Experience Booklet” for additional information to prepare for your upcoming surgery.

## 2025-05-06 ENCOUNTER — TELEPHONE (OUTPATIENT)
Dept: HEMATOLOGY ONCOLOGY | Facility: CLINIC | Age: 55
End: 2025-05-06

## 2025-05-06 ENCOUNTER — APPOINTMENT (OUTPATIENT)
Dept: LAB | Facility: CLINIC | Age: 55
End: 2025-05-06
Payer: COMMERCIAL

## 2025-05-06 DIAGNOSIS — Z51.81 ANTICOAGULATION MANAGEMENT ENCOUNTER: ICD-10-CM

## 2025-05-06 DIAGNOSIS — Z79.01 ANTICOAGULATION MANAGEMENT ENCOUNTER: ICD-10-CM

## 2025-05-06 DIAGNOSIS — Z86.718 HISTORY OF DVT (DEEP VEIN THROMBOSIS): ICD-10-CM

## 2025-05-06 LAB
INR PPP: 1.7 (ref 0.85–1.19)
PROTHROMBIN TIME: 20.1 SECONDS (ref 12.3–15)

## 2025-05-06 PROCEDURE — 36415 COLL VENOUS BLD VENIPUNCTURE: CPT

## 2025-05-06 PROCEDURE — 85610 PROTHROMBIN TIME: CPT

## 2025-05-06 NOTE — PLAN OF CARE
Problem: Ineffective Coping  Goal: Participates in unit activities  Description: Interventions:  - Provide therapeutic environment   - Provide required programming   - Redirect inappropriate behaviors   Outcome: Progressing      Patient baseline mental status

## 2025-05-06 NOTE — TELEPHONE ENCOUNTER
Spoke with Blanca to review her INR, which is 1.7.  Informed her that it is subtherapeutic.  She will take 7 mg of Coumadin today then 5 mg tomorrow and Thursday.  Patient will stop her Coumadin starting 5 9 for 5 days prior to her surgery on 5/14/2025.  Instructions have been provided to patient on her AVS as well as my note.

## 2025-05-12 ENCOUNTER — DOCUMENTATION (OUTPATIENT)
Dept: OTHER | Facility: HOSPITAL | Age: 55
End: 2025-05-12

## 2025-05-13 NOTE — DISCHARGE INSTR - AVS FIRST PAGE
Dr. Bautista, DPM  Post-Operative Instructions    1. Take your prescribed medication as directed. You can take ibuprofen in between doses of the narcotic if breakthrough pain occurs.  2. Upon arrival at home, lie down and elevate your surgical foot on 2 pillows. Unless you're moving from the couch, bed, or bathroom, make sure to elevate the foot.  3. Remain quiet, off your feet as much as possible, for the first 24-48 hours. This is when your feet first swell and may become painful. After 48 hours you may begin limited walking following these restrictions:   Heel weightbearing to the right lower extremity in a surgical shoe    4. Drink large quantities of water. Continue a well-balanced diet.  5. Report any unusual discomfort or fever to this office.  6. A limited amount of discomfort and swelling is to be expected. In some cases the skin may take on a bruised appearance. The surgical solution that was applied to your foot prior to the operation is dark in color and the operation site may appear to be oozing when it actually is not.  7. A slight amount of blood is to be expected, and is no cause for alarm. Do not remove the dressings. If there is active bleeding and if the bleeding persists, add additional gauze to the bandage, apply direct pressure, elevate your feet and call this office.  8. Do not get the dressings wet. As regular bathing may be inconvenient, sponge baths are recommended. If you shower, keep the dressing dry.   9. When anesthesia wears off and if any discomfort should be present, apply an ice pack directly over the operated area for 15 minute intervals for several hours or until the pain leaves. (Use in excess in excess of 15 minutes could cause frostbite). Do not use hot water bags or electric pads. A convenient icepack can be made by placing ice cubes in a plastic bag and covering this with a towel.  10. If necessary, take a mild laxative before retiring.  11. Having performed the operation, we  are interested in a prompt recovery. Please cooperate by following the above instructions.  12. Please call to confirm your post-op appointment or call with any other questions.

## 2025-05-14 ENCOUNTER — APPOINTMENT (OUTPATIENT)
Dept: RADIOLOGY | Facility: HOSPITAL | Age: 55
End: 2025-05-14
Payer: COMMERCIAL

## 2025-05-14 ENCOUNTER — ANESTHESIA EVENT (OUTPATIENT)
Dept: PERIOP | Facility: HOSPITAL | Age: 55
End: 2025-05-14
Payer: COMMERCIAL

## 2025-05-14 ENCOUNTER — ANESTHESIA (OUTPATIENT)
Dept: PERIOP | Facility: HOSPITAL | Age: 55
End: 2025-05-14
Payer: COMMERCIAL

## 2025-05-14 ENCOUNTER — HOSPITAL ENCOUNTER (OUTPATIENT)
Facility: HOSPITAL | Age: 55
Setting detail: OUTPATIENT SURGERY
Discharge: HOME | End: 2025-05-14
Attending: STUDENT IN AN ORGANIZED HEALTH CARE EDUCATION/TRAINING PROGRAM | Admitting: STUDENT IN AN ORGANIZED HEALTH CARE EDUCATION/TRAINING PROGRAM
Payer: COMMERCIAL

## 2025-05-14 VITALS
SYSTOLIC BLOOD PRESSURE: 122 MMHG | RESPIRATION RATE: 18 BRPM | HEART RATE: 73 BPM | WEIGHT: 293 LBS | OXYGEN SATURATION: 97 % | TEMPERATURE: 45.5 F | DIASTOLIC BLOOD PRESSURE: 53 MMHG | BODY MASS INDEX: 47.09 KG/M2 | HEIGHT: 66 IN

## 2025-05-14 DIAGNOSIS — Z98.890 POST-OPERATIVE STATE: Primary | ICD-10-CM

## 2025-05-14 DIAGNOSIS — M79.671 PAIN IN RIGHT FOOT: ICD-10-CM

## 2025-05-14 DIAGNOSIS — T84.84XA PAIN DUE TO INTERNAL ORTHOPEDIC PROSTHETIC DEVICES, IMPLANTS AND GRAFTS, INITIAL ENCOUNTER (HCC): ICD-10-CM

## 2025-05-14 LAB
GLUCOSE SERPL-MCNC: 103 MG/DL (ref 65–140)
GLUCOSE SERPL-MCNC: 107 MG/DL (ref 65–140)

## 2025-05-14 PROCEDURE — 73620 X-RAY EXAM OF FOOT: CPT

## 2025-05-14 PROCEDURE — 82948 REAGENT STRIP/BLOOD GLUCOSE: CPT

## 2025-05-14 PROCEDURE — 88300 SURGICAL PATH GROSS: CPT | Performed by: PATHOLOGY

## 2025-05-14 PROCEDURE — 73630 X-RAY EXAM OF FOOT: CPT

## 2025-05-14 RX ORDER — CEFAZOLIN SODIUM 1 G/50ML
1000 SOLUTION INTRAVENOUS ONCE
Status: DISCONTINUED | OUTPATIENT
Start: 2025-05-14 | End: 2025-05-14

## 2025-05-14 RX ORDER — HYDROMORPHONE HCL IN WATER/PF 6 MG/30 ML
0.2 PATIENT CONTROLLED ANALGESIA SYRINGE INTRAVENOUS
Status: DISCONTINUED | OUTPATIENT
Start: 2025-05-14 | End: 2025-05-14 | Stop reason: HOSPADM

## 2025-05-14 RX ORDER — ONDANSETRON 2 MG/ML
4 INJECTION INTRAMUSCULAR; INTRAVENOUS ONCE AS NEEDED
Status: DISCONTINUED | OUTPATIENT
Start: 2025-05-14 | End: 2025-05-14 | Stop reason: HOSPADM

## 2025-05-14 RX ORDER — SODIUM CHLORIDE, SODIUM LACTATE, POTASSIUM CHLORIDE, CALCIUM CHLORIDE 600; 310; 30; 20 MG/100ML; MG/100ML; MG/100ML; MG/100ML
125 INJECTION, SOLUTION INTRAVENOUS CONTINUOUS
Status: DISCONTINUED | OUTPATIENT
Start: 2025-05-14 | End: 2025-05-14 | Stop reason: HOSPADM

## 2025-05-14 RX ORDER — CEFAZOLIN SODIUM 3 G/50ML
3000 SOLUTION INTRAVENOUS
Status: DISCONTINUED | OUTPATIENT
Start: 2025-05-15 | End: 2025-05-14

## 2025-05-14 RX ORDER — MAGNESIUM HYDROXIDE 1200 MG/15ML
LIQUID ORAL AS NEEDED
Status: DISCONTINUED | OUTPATIENT
Start: 2025-05-14 | End: 2025-05-14 | Stop reason: HOSPADM

## 2025-05-14 RX ORDER — FENTANYL CITRATE 50 UG/ML
INJECTION, SOLUTION INTRAMUSCULAR; INTRAVENOUS AS NEEDED
Status: DISCONTINUED | OUTPATIENT
Start: 2025-05-14 | End: 2025-05-14

## 2025-05-14 RX ORDER — LIDOCAINE HYDROCHLORIDE 20 MG/ML
INJECTION, SOLUTION EPIDURAL; INFILTRATION; INTRACAUDAL; PERINEURAL AS NEEDED
Status: DISCONTINUED | OUTPATIENT
Start: 2025-05-14 | End: 2025-05-14

## 2025-05-14 RX ORDER — FENTANYL CITRATE/PF 50 MCG/ML
25 SYRINGE (ML) INJECTION
Status: DISCONTINUED | OUTPATIENT
Start: 2025-05-14 | End: 2025-05-14 | Stop reason: HOSPADM

## 2025-05-14 RX ORDER — CEFAZOLIN SODIUM 2 G/50ML
2000 SOLUTION INTRAVENOUS ONCE
Status: DISCONTINUED | OUTPATIENT
Start: 2025-05-14 | End: 2025-05-14

## 2025-05-14 RX ORDER — LIDOCAINE HYDROCHLORIDE 10 MG/ML
0.5 INJECTION, SOLUTION EPIDURAL; INFILTRATION; INTRACAUDAL; PERINEURAL ONCE AS NEEDED
Status: DISCONTINUED | OUTPATIENT
Start: 2025-05-14 | End: 2025-05-14 | Stop reason: HOSPADM

## 2025-05-14 RX ORDER — CEFAZOLIN SODIUM 3 G/50ML
3000 SOLUTION INTRAVENOUS
Status: COMPLETED | OUTPATIENT
Start: 2025-05-14 | End: 2025-05-14

## 2025-05-14 RX ORDER — PROPOFOL 10 MG/ML
INJECTION, EMULSION INTRAVENOUS AS NEEDED
Status: DISCONTINUED | OUTPATIENT
Start: 2025-05-14 | End: 2025-05-14

## 2025-05-14 RX ORDER — MIDAZOLAM HYDROCHLORIDE 2 MG/2ML
INJECTION, SOLUTION INTRAMUSCULAR; INTRAVENOUS AS NEEDED
Status: DISCONTINUED | OUTPATIENT
Start: 2025-05-14 | End: 2025-05-14

## 2025-05-14 RX ORDER — FAMOTIDINE 10 MG/ML
20 INJECTION, SOLUTION INTRAVENOUS ONCE
Status: COMPLETED | OUTPATIENT
Start: 2025-05-14 | End: 2025-05-14

## 2025-05-14 RX ADMIN — DEXMEDETOMIDINE HYDROCHLORIDE 12 MCG: 100 INJECTION, SOLUTION INTRAVENOUS at 13:35

## 2025-05-14 RX ADMIN — MIDAZOLAM 2 MG: 1 INJECTION INTRAMUSCULAR; INTRAVENOUS at 13:14

## 2025-05-14 RX ADMIN — LIDOCAINE HYDROCHLORIDE 100 MG: 20 INJECTION, SOLUTION EPIDURAL; INFILTRATION; INTRACAUDAL; PERINEURAL at 13:21

## 2025-05-14 RX ADMIN — FENTANYL CITRATE 50 MCG: 50 INJECTION INTRAMUSCULAR; INTRAVENOUS at 13:26

## 2025-05-14 RX ADMIN — DEXMEDETOMIDINE HYDROCHLORIDE 8 MCG: 100 INJECTION, SOLUTION INTRAVENOUS at 13:26

## 2025-05-14 RX ADMIN — PROPOFOL 100 MCG/KG/MIN: 10 INJECTION, EMULSION INTRAVENOUS at 13:22

## 2025-05-14 RX ADMIN — DEXMEDETOMIDINE HYDROCHLORIDE 12 MCG: 100 INJECTION, SOLUTION INTRAVENOUS at 13:21

## 2025-05-14 RX ADMIN — PROPOFOL 50 MG: 10 INJECTION, EMULSION INTRAVENOUS at 13:26

## 2025-05-14 RX ADMIN — DEXMEDETOMIDINE HYDROCHLORIDE 8 MCG: 100 INJECTION, SOLUTION INTRAVENOUS at 13:31

## 2025-05-14 RX ADMIN — DEXMEDETOMIDINE HYDROCHLORIDE 8 MCG: 100 INJECTION, SOLUTION INTRAVENOUS at 13:42

## 2025-05-14 RX ADMIN — SODIUM CHLORIDE, SODIUM LACTATE, POTASSIUM CHLORIDE, AND CALCIUM CHLORIDE 125 ML/HR: .6; .31; .03; .02 INJECTION, SOLUTION INTRAVENOUS at 12:40

## 2025-05-14 RX ADMIN — DEXMEDETOMIDINE HYDROCHLORIDE 4 MCG: 100 INJECTION, SOLUTION INTRAVENOUS at 13:53

## 2025-05-14 RX ADMIN — FENTANYL CITRATE 50 MCG: 50 INJECTION INTRAMUSCULAR; INTRAVENOUS at 13:22

## 2025-05-14 RX ADMIN — PROPOFOL 80 MG: 10 INJECTION, EMULSION INTRAVENOUS at 13:21

## 2025-05-14 RX ADMIN — DEXMEDETOMIDINE HYDROCHLORIDE 8 MCG: 100 INJECTION, SOLUTION INTRAVENOUS at 13:48

## 2025-05-14 RX ADMIN — CEFAZOLIN SODIUM 3000 MG: 3 SOLUTION INTRAVENOUS at 13:18

## 2025-05-14 RX ADMIN — FAMOTIDINE 20 MG: 10 INJECTION, SOLUTION INTRAVENOUS at 12:06

## 2025-05-14 NOTE — DISCHARGE SUMMARY
Discharge Summary Outpatient Procedure Podiatry -   Blanca Mason 54 y.o. female MRN: [id number]  Unit/Bed#: OR [place] Encounter: [id number]    Admission Date: 5/14/2025     Admitting Diagnosis: Pain in right foot [M79.671]  Pain due to internal orthopedic prosthetic devices, implants and grafts, initial encounter ([facility]) [T84.84XA]    Discharge Diagnosis: same    Procedures Performed: FOOT REMOVAL OF PAINFUL HARDWARE, REVISION OF SCAR: 61196 (CPT®)      Complications: none    Condition at Discharge: stable    Discharge instructions/Information to patient and family:   See after visit summary for information provided to patient and family.      Provisions for Follow-Up Care/Important appointments:  See after visit summary for information related to follow-up care and any pertinent home health orders.      Discharge Medications:  See after visit summary for reconciled discharge medications provided to patient and family.

## 2025-05-14 NOTE — OP NOTE
OPERATIVE REPORT - Podiatry  PATIENT NAME: Blanca Mason    :  1970  MRN: [id number]  Pt Location: [facility] OR ROOM 01    SURGERY DATE: 2025    Surgeons and Role:     * Kaz Bautista DPM - Primary     * Naomy Stauffer DPM - Assisting    Pre-op Diagnosis:  Pain in right foot [M79.671]  Pain due to internal orthopedic prosthetic devices, implants and grafts, initial encounter ([facility]) [T84.84XA]    Post-Op Diagnosis Codes:     * Pain in right foot [M79.671]     * Pain due to internal orthopedic prosthetic devices, implants and grafts, initial encounter ([facility]) [T84.84XA]    Procedure(s) (LRB):  FOOT REMOVAL OF PAINFUL HARDWARE, REVISION OF SCAR (Right)    Specimen(s):  ID Type Source Tests Collected by Time Destination   1 : RIGHT FOOT Other Surgical Hardware/Implant TISSUE EXAM Kaz Bautista DPM 2025 1358        Estimated Blood Loss:   Minimal    Drains:  * No LDAs found *    Anesthesia Type:   IV Sedation with Anesthesia with 20 ml of 1% Lidocaine and 0.5% Bupivacaine in a 1:1 mixture    Hemostasis:  - Pneumatic ankle tourniquet set at 250 mmHg for 38 mins  - Direct compression    Materials:  Implant Name Type Inv. Item Serial No.  Lot No. LRB No. Used Action   SCREW BEKA 3.5 X 34MM LOPRFL - [id number]  SCREW BEKA 3.5 X 34MM LOPRFL  [facility]  Right 1 Explanted   SCREW LCK 3.5 X 16MM FLLY THRD - [id number]  SCREW LCK 3.5 X 16MM FLLY THRD  [facility]  Right 1 Explanted   SCREW LCK 3.5 X 18MM FLLY THRD - [id number]  SCREW LCK 3.5 X 18MM FLLY THRD  [facility]  Right 2 Explanted   SCREW LCK 3.5 X 24MM FLLY THRD - [id number]  SCREW LCK 3.5 X 24MM FLLY THRD  [facility]  Right 1 Explanted   SCREW NICK 4 X 35MM LNG THRD - [id number]  SCREW NICK 4 X 35MM LNG THRD  [employer]  Right 1 Explanted     3-0 vicryl  3-0 monocryl  4-0 prolene     Injectables:  None    Operative Findings:  - Right foot painful hardware removal.  Confirmed on C arm  - Scar revision  performed by ellipsing out previous scar, primary closure achieved     Complications:   None    Procedure and Technique:     Under mild sedation, the patient was brought into the operating room and placed on the operating room table in the supine position. IV sedation was achieved by anesthesia team and a universal timeout was performed where all parties are in agreement of correct patient, correct procedure and correct site. A pneumatic tourniquet was then placed over the patient's right lower extremity with ample padding. A local block was performed consisting of 20 ml of 1% Lidocaine and 0.5% Bupivacaine in a 1:1 mixture. The foot was then prepped and draped in the usual aseptic manner. An esmarch bandage was used to exsangunate the foot and the pneumatic tourniquet was then inflated to 250 mmHg.    Attention was then directed to the patient's right dorsal midfoot where a hypertrophied scar from patient's prior Lapidus bunionectomy was appreciated.  Utilizing a #15 blade an elliptical incision performed about the prior scar with removal of underlying scar tissue.  Utilizing both blunt and sharp dissection the incision was deepened to the level of the previously implanted plate and screws.  The screws were then visualize and removed using a screw  and the plate was then removed.  Fluoroscopy was utilized to check for adequate hardware removal.  No retained implants noted in the 1st TMTJ. Under fluoroscopy stress imaging was then performed and the first tarsometatarsal joint was noted to be adequately fused.    The surgical incision was irrigated with copious amounts of normal sterile saline. The periosteal and capsular structures were reapproximated using 3-0 vicryl. Subcutaneous closure was obtained utilizing 3-0 monocryl. Skin edges were reapproximated and closure was obtained utilizing 4-0 prolene. The foot was then cleansed and dried. The incision site was dressed with adaptic, 4x4 gauze. This was then  "covered with a Kerlix and an ACE wrap.     The tourniquet was deflated at approximately 38 min and normal hyperemic response was noted to all digits. The patient tolerated the procedure and anesthesia well without immediate complications and transferred to PACU with vital signs stable.     The patient is to remain heel weightbearing to the right lower extremity at this time.    Dr. Bautista was present during the entire procedure and participated in all key aspects.    SIGNATURE: Naomy Stauffer DPM  DATE: May 14, 2025  TIME: 2:39 PM      Portions of the record may have been created with voice recognition software. Occasional wrong word or \"sound a like\" substitutions may have occurred due to the inherent limitations of voice recognition software. Read the chart carefully and recognize, using context, where substitutions have occurred.    "

## 2025-05-14 NOTE — ANESTHESIA PREPROCEDURE EVALUATION
Procedure:  FOOT REMOVAL OF PAINFUL HARDWARE, REVISION OF SCAR (Right: Foot)    Relevant Problems   ANESTHESIA   (-) History of anesthesia complications      CARDIO   (+) Benign essential hypertension   (+) Chronic migraine without aura without status migrainosus, not intractable   (+) Mixed hyperlipidemia      ENDO   (+) Hypothyroidism      NEURO/PSYCH   (+) Anxiety   (+) Chronic migraine without aura without status migrainosus, not intractable   (+) Major depressive disorder      PULMONARY   (+) COPD (chronic obstructive pulmonary disease) (HCC)   (+) MAG (obstructive sleep apnea)      Behavioral Health   (+) Mood disorder (HCC)   (+) Schizoaffective disorder, bipolar type (HCC)      Rheumatology   (+) Sjogren's syndrome (HCC)      Other   (+) BMI 45.0-49.9, adult ([facility])   (+) History of DVT (deep vein thrombosis)   (+) History of pulmonary embolism        Physical Exam    Airway     Mallampati score: II  TM Distance: >3 FB  Neck ROM: full      Cardiovascular      Dental       Pulmonary      Neurological      Other Findings  post-pubertal.      Anesthesia Plan  ASA Score- 3     Anesthesia Type- MAC with ASA Monitors.         Additional Monitors:     Airway Plan: natural airway.           Plan Factors-    Chart reviewed. EKG reviewed.  Existing labs reviewed. Patient summary reviewed.                  Induction- intravenous.    Postoperative Plan- .   Monitoring Plan - Monitoring plan - standard ASA monitoring          Informed Consent- Anesthetic plan and risks discussed with patient.  I personally reviewed this patient with the CRNA. Discussed and agreed on the Anesthesia Plan with the CRNA..      NPO Status:  No vitals data found for the desired time range.

## 2025-05-14 NOTE — INTERVAL H&P NOTE
H&P reviewed. After examining the patient I find no changes in the patients condition since the H&P had been written.    Vitals:    05/14/25 1127   BP: 160/82   Pulse: 86   Resp: 18   Temp: 97.5 °F (36.4 °C)   SpO2: 95%

## 2025-05-15 ENCOUNTER — TELEPHONE (OUTPATIENT)
Age: 55
End: 2025-05-15

## 2025-05-15 PROCEDURE — 88300 SURGICAL PATH GROSS: CPT | Performed by: PATHOLOGY

## 2025-05-15 NOTE — ANESTHESIA POSTPROCEDURE EVALUATION
Post-Op Assessment Note    Last Filed PACU Vitals:  Vitals Value Taken Time   Temp 97.7 °F (36.5 °C) 05/14/25 14:49   Pulse 46 05/14/25 14:53   /76 05/14/25 14:50   Resp 21 05/14/25 14:53   SpO2 99 % 05/14/25 14:53   Vitals shown include unfiled device data.    Modified Catarino:     Vitals Value Taken Time   Activity 2 05/14/25 14:49   Respiration 2 05/14/25 14:49   Circulation 2 05/14/25 14:49   Consciousness 2 05/14/25 14:49   Oxygen Saturation 2 05/14/25 14:49     Modified Catarino Score: 10

## 2025-05-15 NOTE — TELEPHONE ENCOUNTER
Patient called in requesting an update on work forms from [employer].  Patient given office fax number to have [employer] refax forms she stated were fax to office a couple weeks ago,.

## 2025-05-16 ENCOUNTER — TELEPHONE (OUTPATIENT)
Age: 55
End: 2025-05-16

## 2025-05-16 NOTE — TELEPHONE ENCOUNTER
Spoke with patient and confirm she is taking the Lovenox twice daily and started bridging with Coumadin.  Since she started 5/15/2025 after surgery, we will recheck her INR on Thursday, 5/22/2025.  I will call her and make adjustments as needed.  She voiced understanding.

## 2025-05-16 NOTE — TELEPHONE ENCOUNTER
Blanca calling in to let Heatherdee Arreagaal know her orthopedic surgeon restarted her Lovenox on 5/15/25 and she is taking 150mg.

## 2025-05-19 NOTE — TELEPHONE ENCOUNTER
Patient called to clarify when she will need to have labs completed again for INR. Advised patient Heather will like to recheck INR on Thursday 5/22.     Patient voiced understanding.

## 2025-05-21 ENCOUNTER — APPOINTMENT (OUTPATIENT)
Dept: LAB | Facility: CLINIC | Age: 55
End: 2025-05-21
Payer: COMMERCIAL

## 2025-05-21 ENCOUNTER — TRANSCRIBE ORDERS (OUTPATIENT)
Dept: LAB | Facility: CLINIC | Age: 55
End: 2025-05-21

## 2025-05-21 DIAGNOSIS — E03.9 HYPOTHYROIDISM, ADULT: Primary | ICD-10-CM

## 2025-05-21 DIAGNOSIS — E03.9 HYPOTHYROIDISM, ADULT: ICD-10-CM

## 2025-05-21 DIAGNOSIS — Z86.718 HISTORY OF DVT (DEEP VEIN THROMBOSIS): ICD-10-CM

## 2025-05-21 DIAGNOSIS — Z51.81 ANTICOAGULATION MANAGEMENT ENCOUNTER: ICD-10-CM

## 2025-05-21 DIAGNOSIS — Z79.01 ANTICOAGULATION MANAGEMENT ENCOUNTER: ICD-10-CM

## 2025-05-21 LAB
INR PPP: 2.06 (ref 0.85–1.19)
PROTHROMBIN TIME: 23.2 SECONDS (ref 12.3–15)
T3FREE SERPL-MCNC: 2.6 PG/ML (ref 2.5–3.9)
T4 FREE SERPL-MCNC: 0.69 NG/DL (ref 0.61–1.12)
TSH SERPL DL<=0.05 MIU/L-ACNC: 0.97 UIU/ML (ref 0.45–4.5)

## 2025-05-21 PROCEDURE — 84481 FREE ASSAY (FT-3): CPT

## 2025-05-21 PROCEDURE — 84443 ASSAY THYROID STIM HORMONE: CPT

## 2025-05-21 PROCEDURE — 85610 PROTHROMBIN TIME: CPT

## 2025-05-21 PROCEDURE — 36415 COLL VENOUS BLD VENIPUNCTURE: CPT

## 2025-05-21 PROCEDURE — 84439 ASSAY OF FREE THYROXINE: CPT

## 2025-05-22 ENCOUNTER — TELEPHONE (OUTPATIENT)
Dept: HEMATOLOGY ONCOLOGY | Facility: CLINIC | Age: 55
End: 2025-05-22

## 2025-05-22 NOTE — TELEPHONE ENCOUNTER
Spoke with patient and informed her that her INR is therapeutc therefore to stop the Lovenox injections.  She will continue Coumadin 5 mg M-F daily and 2 mg Saturday and Sunday.  Patient will repeat INR next week.  She voiced understanding.

## 2025-05-29 ENCOUNTER — TELEPHONE (OUTPATIENT)
Age: 55
End: 2025-05-29

## 2025-05-29 ENCOUNTER — APPOINTMENT (OUTPATIENT)
Dept: LAB | Facility: CLINIC | Age: 55
End: 2025-05-29
Payer: COMMERCIAL

## 2025-05-29 DIAGNOSIS — Z86.718 HISTORY OF DVT (DEEP VEIN THROMBOSIS): ICD-10-CM

## 2025-05-29 DIAGNOSIS — Z79.01 ANTICOAGULATION MANAGEMENT ENCOUNTER: ICD-10-CM

## 2025-05-29 DIAGNOSIS — Z51.81 ANTICOAGULATION MANAGEMENT ENCOUNTER: ICD-10-CM

## 2025-05-29 LAB
INR PPP: 3.28 (ref 0.85–1.19)
PROTHROMBIN TIME: 33 SECONDS (ref 12.3–15)

## 2025-05-29 PROCEDURE — 85610 PROTHROMBIN TIME: CPT

## 2025-05-29 PROCEDURE — 36415 COLL VENOUS BLD VENIPUNCTURE: CPT

## 2025-05-29 NOTE — TELEPHONE ENCOUNTER
Patient called to reschedule her appointment for today, 5/29/2025 at 2 pm with Aria Toney for her Botox Injection.  Patient states she is ill.    Patient requesting to rescheduled as soon as possible.    Please contact patient to reschedule.

## 2025-05-30 ENCOUNTER — TELEPHONE (OUTPATIENT)
Dept: HEMATOLOGY ONCOLOGY | Facility: CLINIC | Age: 55
End: 2025-05-30

## 2025-05-30 NOTE — TELEPHONE ENCOUNTER
Spoke with Blanca, informed her of her INR 3.2.  It's slightly elevated however, recommend she continue the same dose and repeat INR next week. She voiced understanding.

## 2025-06-03 ENCOUNTER — TELEPHONE (OUTPATIENT)
Age: 55
End: 2025-06-03

## 2025-06-03 RX ORDER — TRAMADOL HYDROCHLORIDE 50 MG/1
TABLET ORAL
COMMUNITY
Start: 2025-05-13 | End: 2025-06-06

## 2025-06-03 RX ORDER — PALIPERIDONE PALMITATE 819 MG/2.625ML
INJECTION, SUSPENSION, EXTENDED RELEASE INTRAMUSCULAR
COMMUNITY
Start: 2025-05-07

## 2025-06-03 NOTE — TELEPHONE ENCOUNTER
Please inform patient at the last phone call I had asked her to stop the lovenox injection.  Please read my telephone note and inform patient to stop the lovenox.

## 2025-06-03 NOTE — TELEPHONE ENCOUNTER
Received a phone call from patient.  Patient stated that she gave herself the last Lovenox injection in arm X2 days ago and arm is bruised and is painful.  Patient gave herself injection in the antecubital of arm.  Educated patient on SQ injection sites and instructed where to give in arm.  Patient verbalized understanding.  Recommended taking Tylenol and applying warm compresses for 20 minutes, four times a day.  Instructed patient to call back if worsens.  Patient verbalized understanding.

## 2025-06-03 NOTE — DISCHARGE INSTRUCTIONS
Your exam and testing was very reassuring  Please follow up with your doctor next week  If you feel worse please come back to the ER  We are always here and always happy to re-evaluate! Yes

## 2025-06-04 ENCOUNTER — HOSPITAL ENCOUNTER (INPATIENT)
Facility: HOSPITAL | Age: 55
LOS: 2 days | Discharge: HOME | End: 2025-06-06
Attending: EMERGENCY MEDICINE | Admitting: INTERNAL MEDICINE
Payer: COMMERCIAL

## 2025-06-04 ENCOUNTER — APPOINTMENT (EMERGENCY)
Dept: NON INVASIVE DIAGNOSTICS | Facility: HOSPITAL | Age: 55
End: 2025-06-04
Payer: COMMERCIAL

## 2025-06-04 ENCOUNTER — APPOINTMENT (INPATIENT)
Dept: RADIOLOGY | Facility: HOSPITAL | Age: 55
End: 2025-06-04
Payer: COMMERCIAL

## 2025-06-04 ENCOUNTER — APPOINTMENT (EMERGENCY)
Dept: RADIOLOGY | Facility: HOSPITAL | Age: 55
End: 2025-06-04
Payer: COMMERCIAL

## 2025-06-04 ENCOUNTER — OFFICE VISIT (OUTPATIENT)
Dept: FAMILY MEDICINE CLINIC | Facility: CLINIC | Age: 55
End: 2025-06-04
Payer: COMMERCIAL

## 2025-06-04 VITALS
TEMPERATURE: 97.9 F | HEART RATE: 125 BPM | OXYGEN SATURATION: 95 % | HEIGHT: 66 IN | WEIGHT: 293 LBS | BODY MASS INDEX: 47.09 KG/M2

## 2025-06-04 DIAGNOSIS — M79.662 PAIN AND SWELLING OF LEFT LOWER LEG: ICD-10-CM

## 2025-06-04 DIAGNOSIS — F39 MOOD DISORDER (HCC): ICD-10-CM

## 2025-06-04 DIAGNOSIS — R41.82 ALTERED MENTAL STATUS, UNSPECIFIED ALTERED MENTAL STATUS TYPE: Primary | ICD-10-CM

## 2025-06-04 DIAGNOSIS — R41.82 ALTERED MENTAL STATUS, UNSPECIFIED ALTERED MENTAL STATUS TYPE: ICD-10-CM

## 2025-06-04 DIAGNOSIS — R41.89 COGNITIVE IMPAIRMENT: ICD-10-CM

## 2025-06-04 DIAGNOSIS — Z86.711 HISTORY OF PULMONARY EMBOLISM: ICD-10-CM

## 2025-06-04 DIAGNOSIS — M79.89 PAIN AND SWELLING OF LEFT LOWER LEG: ICD-10-CM

## 2025-06-04 DIAGNOSIS — Z86.718 HISTORY OF DVT (DEEP VEIN THROMBOSIS): ICD-10-CM

## 2025-06-04 DIAGNOSIS — L03.90 CELLULITIS: ICD-10-CM

## 2025-06-04 DIAGNOSIS — A41.9 SEPSIS (HCC): ICD-10-CM

## 2025-06-04 DIAGNOSIS — R00.0 TACHYCARDIA: Primary | ICD-10-CM

## 2025-06-04 DIAGNOSIS — M79.89 SWELLING OF LEFT LOWER EXTREMITY: ICD-10-CM

## 2025-06-04 DIAGNOSIS — F33.2 SEVERE EPISODE OF RECURRENT MAJOR DEPRESSIVE DISORDER, WITHOUT PSYCHOTIC FEATURES (HCC): ICD-10-CM

## 2025-06-04 DIAGNOSIS — R41.0 CONFUSION: ICD-10-CM

## 2025-06-04 DIAGNOSIS — F60.3 BORDERLINE PERSONALITY DISORDER (HCC): ICD-10-CM

## 2025-06-04 DIAGNOSIS — E87.6 HYPOKALEMIA: ICD-10-CM

## 2025-06-04 PROBLEM — K21.9 GERD (GASTROESOPHAGEAL REFLUX DISEASE): Status: ACTIVE | Noted: 2025-06-04

## 2025-06-04 PROBLEM — E87.20 LACTIC ACIDOSIS: Status: ACTIVE | Noted: 2025-06-04

## 2025-06-04 PROBLEM — I82.409 DEEP VEIN THROMBOSIS (DVT) OF LOWER EXTREMITY (HCC): Status: ACTIVE | Noted: 2025-06-04

## 2025-06-04 PROBLEM — E03.9 HYPOTHYROID: Status: ACTIVE | Noted: 2025-06-04

## 2025-06-04 PROBLEM — I26.99 PE (PULMONARY THROMBOEMBOLISM) (HCC): Status: ACTIVE | Noted: 2025-06-04

## 2025-06-04 LAB
2HR DELTA HS TROPONIN: 0 NG/L
4HR DELTA HS TROPONIN: -1 NG/L
ALBUMIN SERPL BCG-MCNC: 3.6 G/DL (ref 3.5–5)
ALP SERPL-CCNC: 83 U/L (ref 34–104)
ALT SERPL W P-5'-P-CCNC: 47 U/L (ref 7–52)
AMMONIA PLAS-SCNC: 41 UMOL/L (ref 18–72)
AMPHETAMINES SERPL QL SCN: NEGATIVE
ANION GAP SERPL CALCULATED.3IONS-SCNC: 11 MMOL/L (ref 4–13)
ANION GAP SERPL CALCULATED.3IONS-SCNC: 14 MMOL/L (ref 4–13)
APTT PPP: 55 SECONDS (ref 23–34)
AST SERPL W P-5'-P-CCNC: 35 U/L (ref 13–39)
BACTERIA UR QL AUTO: ABNORMAL /HPF
BARBITURATES UR QL: NEGATIVE
BASE EXCESS BLDA CALC-SCNC: -3 MMOL/L (ref -2–3)
BASOPHILS # BLD AUTO: 0.03 THOUSANDS/ÂΜL (ref 0–0.1)
BASOPHILS NFR BLD AUTO: 0 % (ref 0–1)
BENZODIAZ UR QL: NEGATIVE
BILIRUB SERPL-MCNC: 0.57 MG/DL (ref 0.2–1)
BILIRUB UR QL STRIP: NEGATIVE
BNP SERPL-MCNC: 30 PG/ML (ref 0–100)
BUN SERPL-MCNC: 3 MG/DL (ref 5–25)
BUN SERPL-MCNC: 4 MG/DL (ref 5–25)
CA-I BLD-SCNC: 1.1 MMOL/L (ref 1.12–1.32)
CALCIUM SERPL-MCNC: 8 MG/DL (ref 8.4–10.2)
CALCIUM SERPL-MCNC: 8.6 MG/DL (ref 8.4–10.2)
CARDIAC TROPONIN I PNL SERPL HS: 7 NG/L (ref ?–50)
CARDIAC TROPONIN I PNL SERPL HS: 8 NG/L (ref ?–50)
CARDIAC TROPONIN I PNL SERPL HS: 8 NG/L (ref ?–50)
CHLORIDE SERPL-SCNC: 105 MMOL/L (ref 96–108)
CHLORIDE SERPL-SCNC: 105 MMOL/L (ref 96–108)
CLARITY UR: CLEAR
CO2 SERPL-SCNC: 22 MMOL/L (ref 21–32)
CO2 SERPL-SCNC: 23 MMOL/L (ref 21–32)
COCAINE UR QL: NEGATIVE
COLOR UR: YELLOW
CREAT SERPL-MCNC: 0.62 MG/DL (ref 0.6–1.3)
CREAT SERPL-MCNC: 0.66 MG/DL (ref 0.6–1.3)
EOSINOPHIL # BLD AUTO: 0.12 THOUSAND/ÂΜL (ref 0–0.61)
EOSINOPHIL NFR BLD AUTO: 1 % (ref 0–6)
ERYTHROCYTE [DISTWIDTH] IN BLOOD BY AUTOMATED COUNT: 15.1 % (ref 11.6–15.1)
FENTANYL UR QL SCN: NEGATIVE
GFR SERPL CREATININE-BSD FRML MDRD: 101 ML/MIN/1.73SQ M
GFR SERPL CREATININE-BSD FRML MDRD: 103 ML/MIN/1.73SQ M
GLUCOSE SERPL-MCNC: 117 MG/DL (ref 65–140)
GLUCOSE SERPL-MCNC: 118 MG/DL (ref 65–140)
GLUCOSE SERPL-MCNC: 119 MG/DL (ref 65–140)
GLUCOSE SERPL-MCNC: 128 MG/DL (ref 65–140)
GLUCOSE UR STRIP-MCNC: NEGATIVE MG/DL
HCO3 BLDA-SCNC: 19.9 MMOL/L (ref 22–28)
HCT VFR BLD AUTO: 41.7 % (ref 34.8–46.1)
HCT VFR BLD CALC: 41 % (ref 34.8–46.1)
HGB BLD-MCNC: 13.8 G/DL (ref 11.5–15.4)
HGB BLDA-MCNC: 13.9 G/DL (ref 11.5–15.4)
HGB UR QL STRIP.AUTO: NEGATIVE
HYDROCODONE UR QL SCN: NEGATIVE
IMM GRANULOCYTES # BLD AUTO: 0.04 THOUSAND/UL (ref 0–0.2)
IMM GRANULOCYTES NFR BLD AUTO: 0 % (ref 0–2)
INR PPP: 3.17 (ref 0.85–1.19)
KETONES UR STRIP-MCNC: NEGATIVE MG/DL
LACTATE SERPL-SCNC: 2.9 MMOL/L (ref 0.5–2)
LACTATE SERPL-SCNC: 3.1 MMOL/L (ref 0.5–2)
LEUKOCYTE ESTERASE UR QL STRIP: NEGATIVE
LYMPHOCYTES # BLD AUTO: 2.41 THOUSANDS/ÂΜL (ref 0.6–4.47)
LYMPHOCYTES NFR BLD AUTO: 24 % (ref 14–44)
MCH RBC QN AUTO: 28 PG (ref 26.8–34.3)
MCHC RBC AUTO-ENTMCNC: 33.1 G/DL (ref 31.4–37.4)
MCV RBC AUTO: 85 FL (ref 82–98)
METHADONE UR QL: NEGATIVE
MONOCYTES # BLD AUTO: 1.2 THOUSAND/ÂΜL (ref 0.17–1.22)
MONOCYTES NFR BLD AUTO: 12 % (ref 4–12)
MUCOUS THREADS UR QL AUTO: ABNORMAL
NEUTROPHILS # BLD AUTO: 6.36 THOUSANDS/ÂΜL (ref 1.85–7.62)
NEUTS SEG NFR BLD AUTO: 63 % (ref 43–75)
NITRITE UR QL STRIP: POSITIVE
NON-SQ EPI CELLS URNS QL MICRO: ABNORMAL /HPF
NRBC BLD AUTO-RTO: 0 /100 WBCS
OPIATES UR QL SCN: NEGATIVE
OXYCODONE+OXYMORPHONE UR QL SCN: NEGATIVE
PCO2 BLD: 21 MMOL/L (ref 21–32)
PCO2 BLD: 29.4 MM HG (ref 36–44)
PCP UR QL: NEGATIVE
PH BLD: 7.44 [PH] (ref 7.35–7.45)
PH UR STRIP.AUTO: 5.5 [PH]
PLATELET # BLD AUTO: 211 THOUSANDS/UL (ref 149–390)
PMV BLD AUTO: 10.7 FL (ref 8.9–12.7)
PO2 BLD: 66 MM HG (ref 75–129)
POTASSIUM BLD-SCNC: 3 MMOL/L (ref 3.5–5.3)
POTASSIUM SERPL-SCNC: 2.8 MMOL/L (ref 3.5–5.3)
POTASSIUM SERPL-SCNC: 3 MMOL/L (ref 3.5–5.3)
PROT SERPL-MCNC: 6.6 G/DL (ref 6.4–8.4)
PROT UR STRIP-MCNC: ABNORMAL MG/DL
PROTHROMBIN TIME: 32.2 SECONDS (ref 12.3–15)
RBC # BLD AUTO: 4.92 MILLION/UL (ref 3.81–5.12)
RBC #/AREA URNS AUTO: ABNORMAL /HPF
SAO2 % BLD FROM PO2: 94 % (ref 60–85)
SODIUM BLD-SCNC: 138 MMOL/L (ref 136–145)
SODIUM SERPL-SCNC: 139 MMOL/L (ref 135–147)
SODIUM SERPL-SCNC: 141 MMOL/L (ref 135–147)
SP GR UR STRIP.AUTO: >=1.05 (ref 1–1.03)
SPECIMEN SOURCE: ABNORMAL
T4 FREE SERPL-MCNC: 1.14 NG/DL (ref 0.61–1.12)
THC UR QL: POSITIVE
TSH SERPL DL<=0.05 MIU/L-ACNC: 3.31 UIU/ML (ref 0.45–4.5)
UROBILINOGEN UR STRIP-ACNC: <2 MG/DL
VIT B12 SERPL-MCNC: 1523 PG/ML (ref 180–914)
WBC # BLD AUTO: 10.16 THOUSAND/UL (ref 4.31–10.16)
WBC #/AREA URNS AUTO: ABNORMAL /HPF

## 2025-06-04 PROCEDURE — 99285 EMERGENCY DEPT VISIT HI MDM: CPT

## 2025-06-04 PROCEDURE — 84132 ASSAY OF SERUM POTASSIUM: CPT

## 2025-06-04 PROCEDURE — 93005 ELECTROCARDIOGRAM TRACING: CPT

## 2025-06-04 PROCEDURE — 99215 OFFICE O/P EST HI 40 MIN: CPT

## 2025-06-04 PROCEDURE — 82803 BLOOD GASES ANY COMBINATION: CPT

## 2025-06-04 PROCEDURE — 80307 DRUG TEST PRSMV CHEM ANLYZR: CPT

## 2025-06-04 PROCEDURE — 81001 URINALYSIS AUTO W/SCOPE: CPT

## 2025-06-04 PROCEDURE — 80048 BASIC METABOLIC PNL TOTAL CA: CPT

## 2025-06-04 PROCEDURE — 36415 COLL VENOUS BLD VENIPUNCTURE: CPT

## 2025-06-04 PROCEDURE — 82330 ASSAY OF CALCIUM: CPT

## 2025-06-04 PROCEDURE — 96365 THER/PROPH/DIAG IV INF INIT: CPT

## 2025-06-04 PROCEDURE — 96368 THER/DIAG CONCURRENT INF: CPT

## 2025-06-04 PROCEDURE — 99285 EMERGENCY DEPT VISIT HI MDM: CPT | Performed by: EMERGENCY MEDICINE

## 2025-06-04 PROCEDURE — 71275 CT ANGIOGRAPHY CHEST: CPT

## 2025-06-04 PROCEDURE — 85730 THROMBOPLASTIN TIME PARTIAL: CPT

## 2025-06-04 PROCEDURE — 82948 REAGENT STRIP/BLOOD GLUCOSE: CPT

## 2025-06-04 PROCEDURE — 82947 ASSAY GLUCOSE BLOOD QUANT: CPT

## 2025-06-04 PROCEDURE — NC001 PR NO CHARGE: Performed by: INTERNAL MEDICINE

## 2025-06-04 PROCEDURE — 84443 ASSAY THYROID STIM HORMONE: CPT

## 2025-06-04 PROCEDURE — 93971 EXTREMITY STUDY: CPT | Performed by: SURGERY

## 2025-06-04 PROCEDURE — 99223 1ST HOSP IP/OBS HIGH 75: CPT | Performed by: INTERNAL MEDICINE

## 2025-06-04 PROCEDURE — 85610 PROTHROMBIN TIME: CPT

## 2025-06-04 PROCEDURE — 70450 CT HEAD/BRAIN W/O DYE: CPT

## 2025-06-04 PROCEDURE — 36600 WITHDRAWAL OF ARTERIAL BLOOD: CPT

## 2025-06-04 PROCEDURE — 83605 ASSAY OF LACTIC ACID: CPT

## 2025-06-04 PROCEDURE — 85014 HEMATOCRIT: CPT

## 2025-06-04 PROCEDURE — 80053 COMPREHEN METABOLIC PANEL: CPT

## 2025-06-04 PROCEDURE — 93971 EXTREMITY STUDY: CPT

## 2025-06-04 PROCEDURE — 85025 COMPLETE CBC W/AUTO DIFF WBC: CPT

## 2025-06-04 PROCEDURE — 84425 ASSAY OF VITAMIN B-1: CPT

## 2025-06-04 PROCEDURE — 82607 VITAMIN B-12: CPT

## 2025-06-04 PROCEDURE — 84295 ASSAY OF SERUM SODIUM: CPT

## 2025-06-04 PROCEDURE — 84484 ASSAY OF TROPONIN QUANT: CPT

## 2025-06-04 PROCEDURE — 84439 ASSAY OF FREE THYROXINE: CPT

## 2025-06-04 PROCEDURE — 82140 ASSAY OF AMMONIA: CPT

## 2025-06-04 PROCEDURE — 83880 ASSAY OF NATRIURETIC PEPTIDE: CPT

## 2025-06-04 PROCEDURE — 87040 BLOOD CULTURE FOR BACTERIA: CPT

## 2025-06-04 RX ORDER — SODIUM CHLORIDE, SODIUM GLUCONATE, SODIUM ACETATE, POTASSIUM CHLORIDE, MAGNESIUM CHLORIDE, SODIUM PHOSPHATE, DIBASIC, AND POTASSIUM PHOSPHATE .53; .5; .37; .037; .03; .012; .00082 G/100ML; G/100ML; G/100ML; G/100ML; G/100ML; G/100ML; G/100ML
1000 INJECTION, SOLUTION INTRAVENOUS ONCE
Status: COMPLETED | OUTPATIENT
Start: 2025-06-04 | End: 2025-06-04

## 2025-06-04 RX ORDER — ATORVASTATIN CALCIUM 80 MG/1
80 TABLET, FILM COATED ORAL DAILY
Status: DISCONTINUED | OUTPATIENT
Start: 2025-06-05 | End: 2025-06-06 | Stop reason: HOSPADM

## 2025-06-04 RX ORDER — PILOCARPINE HYDROCHLORIDE 5 MG/1
5 TABLET, FILM COATED ORAL 2 TIMES DAILY
Status: DISCONTINUED | OUTPATIENT
Start: 2025-06-04 | End: 2025-06-06 | Stop reason: HOSPADM

## 2025-06-04 RX ORDER — SODIUM CHLORIDE, SODIUM GLUCONATE, SODIUM ACETATE, POTASSIUM CHLORIDE, MAGNESIUM CHLORIDE, SODIUM PHOSPHATE, DIBASIC, AND POTASSIUM PHOSPHATE .53; .5; .37; .037; .03; .012; .00082 G/100ML; G/100ML; G/100ML; G/100ML; G/100ML; G/100ML; G/100ML
1000 INJECTION, SOLUTION INTRAVENOUS ONCE
Status: COMPLETED | OUTPATIENT
Start: 2025-06-04 | End: 2025-06-05

## 2025-06-04 RX ORDER — FUROSEMIDE 20 MG/1
20 TABLET ORAL DAILY
Status: DISCONTINUED | OUTPATIENT
Start: 2025-06-05 | End: 2025-06-06 | Stop reason: HOSPADM

## 2025-06-04 RX ORDER — TOPIRAMATE 100 MG/1
200 TABLET, FILM COATED ORAL 2 TIMES DAILY
Status: DISCONTINUED | OUTPATIENT
Start: 2025-06-04 | End: 2025-06-05

## 2025-06-04 RX ORDER — CLONIDINE HYDROCHLORIDE 0.1 MG/1
0.3 TABLET ORAL 2 TIMES DAILY
Status: DISCONTINUED | OUTPATIENT
Start: 2025-06-04 | End: 2025-06-05

## 2025-06-04 RX ORDER — INSULIN LISPRO 100 [IU]/ML
2-12 INJECTION, SOLUTION INTRAVENOUS; SUBCUTANEOUS
Status: DISCONTINUED | OUTPATIENT
Start: 2025-06-05 | End: 2025-06-06 | Stop reason: HOSPADM

## 2025-06-04 RX ORDER — POTASSIUM CHLORIDE 1500 MG/1
40 TABLET, EXTENDED RELEASE ORAL ONCE
Status: COMPLETED | OUTPATIENT
Start: 2025-06-04 | End: 2025-06-04

## 2025-06-04 RX ORDER — ROPINIROLE 1 MG/1
1 TABLET, FILM COATED ORAL
Status: DISCONTINUED | OUTPATIENT
Start: 2025-06-04 | End: 2025-06-06 | Stop reason: HOSPADM

## 2025-06-04 RX ORDER — LORATADINE 10 MG/1
5 TABLET ORAL DAILY
Status: DISCONTINUED | OUTPATIENT
Start: 2025-06-05 | End: 2025-06-06 | Stop reason: HOSPADM

## 2025-06-04 RX ORDER — PANTOPRAZOLE SODIUM 40 MG/1
40 TABLET, DELAYED RELEASE ORAL
Status: DISCONTINUED | OUTPATIENT
Start: 2025-06-05 | End: 2025-06-04

## 2025-06-04 RX ORDER — ALBUTEROL SULFATE 90 UG/1
2 INHALANT RESPIRATORY (INHALATION) EVERY 6 HOURS PRN
Status: DISCONTINUED | OUTPATIENT
Start: 2025-06-04 | End: 2025-06-06 | Stop reason: HOSPADM

## 2025-06-04 RX ORDER — POTASSIUM CHLORIDE 1500 MG/1
40 TABLET, EXTENDED RELEASE ORAL ONCE
Status: DISCONTINUED | OUTPATIENT
Start: 2025-06-04 | End: 2025-06-06 | Stop reason: HOSPADM

## 2025-06-04 RX ORDER — NALTREXONE HYDROCHLORIDE 50 MG/1
50 TABLET, FILM COATED ORAL DAILY
Status: DISCONTINUED | OUTPATIENT
Start: 2025-06-05 | End: 2025-06-06 | Stop reason: HOSPADM

## 2025-06-04 RX ORDER — LOSARTAN POTASSIUM 50 MG/1
100 TABLET ORAL DAILY
Status: DISCONTINUED | OUTPATIENT
Start: 2025-06-05 | End: 2025-06-06 | Stop reason: HOSPADM

## 2025-06-04 RX ORDER — ATOMOXETINE 60 MG/1
60 CAPSULE ORAL DAILY
Status: DISCONTINUED | OUTPATIENT
Start: 2025-06-05 | End: 2025-06-06 | Stop reason: HOSPADM

## 2025-06-04 RX ORDER — TRAZODONE HYDROCHLORIDE 50 MG/1
50 TABLET ORAL
Status: DISCONTINUED | OUTPATIENT
Start: 2025-06-05 | End: 2025-06-06 | Stop reason: HOSPADM

## 2025-06-04 RX ORDER — PANTOPRAZOLE SODIUM 40 MG/1
40 TABLET, DELAYED RELEASE ORAL
Status: DISCONTINUED | OUTPATIENT
Start: 2025-06-05 | End: 2025-06-06 | Stop reason: HOSPADM

## 2025-06-04 RX ORDER — CHOLECALCIFEROL (VITAMIN D3) 10(400)/ML
400 DROPS ORAL DAILY
Status: DISCONTINUED | OUTPATIENT
Start: 2025-06-05 | End: 2025-06-06 | Stop reason: HOSPADM

## 2025-06-04 RX ORDER — WARFARIN SODIUM 5 MG/1
5 TABLET ORAL
Status: DISCONTINUED | OUTPATIENT
Start: 2025-06-05 | End: 2025-06-06 | Stop reason: HOSPADM

## 2025-06-04 RX ORDER — POTASSIUM CHLORIDE 14.9 MG/ML
20 INJECTION INTRAVENOUS
Status: DISPENSED | OUTPATIENT
Start: 2025-06-04 | End: 2025-06-04

## 2025-06-04 RX ORDER — WARFARIN SODIUM 2 MG/1
2 TABLET ORAL
Status: DISCONTINUED | OUTPATIENT
Start: 2025-06-07 | End: 2025-06-06 | Stop reason: HOSPADM

## 2025-06-04 RX ORDER — BUPROPION HYDROCHLORIDE 150 MG/1
450 TABLET ORAL DAILY
Status: DISCONTINUED | OUTPATIENT
Start: 2025-06-05 | End: 2025-06-06 | Stop reason: HOSPADM

## 2025-06-04 RX ORDER — NAPROXEN 500 MG/1
500 TABLET ORAL 2 TIMES DAILY WITH MEALS
Status: DISCONTINUED | OUTPATIENT
Start: 2025-06-05 | End: 2025-06-06 | Stop reason: HOSPADM

## 2025-06-04 RX ORDER — POTASSIUM CHLORIDE 14.9 MG/ML
20 INJECTION INTRAVENOUS ONCE
Status: COMPLETED | OUTPATIENT
Start: 2025-06-04 | End: 2025-06-05

## 2025-06-04 RX ORDER — LEVOTHYROXINE SODIUM 125 UG/1
125 TABLET ORAL
Status: DISCONTINUED | OUTPATIENT
Start: 2025-06-05 | End: 2025-06-06 | Stop reason: HOSPADM

## 2025-06-04 RX ADMIN — SODIUM CHLORIDE, SODIUM GLUCONATE, SODIUM ACETATE, POTASSIUM CHLORIDE, MAGNESIUM CHLORIDE, SODIUM PHOSPHATE, DIBASIC, AND POTASSIUM PHOSPHATE 1000 ML: .53; .5; .37; .037; .03; .012; .00082 INJECTION, SOLUTION INTRAVENOUS at 22:00

## 2025-06-04 RX ADMIN — TOPIRAMATE 200 MG: 100 TABLET, FILM COATED ORAL at 22:49

## 2025-06-04 RX ADMIN — PILOCARPINE HYDRCHLORIDE 5 MG: 5 TABLET, FILM COATED ORAL at 22:49

## 2025-06-04 RX ADMIN — OXCARBAZEPINE 450 MG: 300 TABLET, FILM COATED ORAL at 22:49

## 2025-06-04 RX ADMIN — ROPINIROLE 1 MG: 1 TABLET, FILM COATED ORAL at 22:49

## 2025-06-04 RX ADMIN — IOHEXOL 65 ML: 350 INJECTION, SOLUTION INTRAVENOUS at 17:34

## 2025-06-04 RX ADMIN — CEFTRIAXONE SODIUM 2000 MG: 10 INJECTION, POWDER, FOR SOLUTION INTRAVENOUS at 19:23

## 2025-06-04 RX ADMIN — POTASSIUM CHLORIDE 40 MEQ: 1500 TABLET, EXTENDED RELEASE ORAL at 18:55

## 2025-06-04 RX ADMIN — POTASSIUM CHLORIDE 20 MEQ: 14.9 INJECTION, SOLUTION INTRAVENOUS at 19:02

## 2025-06-04 RX ADMIN — POTASSIUM CHLORIDE 20 MEQ: 14.9 INJECTION, SOLUTION INTRAVENOUS at 22:35

## 2025-06-04 RX ADMIN — SODIUM CHLORIDE, SODIUM GLUCONATE, SODIUM ACETATE, POTASSIUM CHLORIDE, MAGNESIUM CHLORIDE, SODIUM PHOSPHATE, DIBASIC, AND POTASSIUM PHOSPHATE 1000 ML: .53; .5; .37; .037; .03; .012; .00082 INJECTION, SOLUTION INTRAVENOUS at 19:12

## 2025-06-04 NOTE — ASSESSMENT & PLAN NOTE
Patient information was obtained from patient.     Follow-up (Doesn't remember thing , feels disoriented, texts people things and doesn't remember )    Patient presents for evaluation of decreased mental status. Onset of symptoms was gradual starting a few days ago, with gradually worsening symptoms since that time. This episode occurred at home. The patient admits to a past history of hypertension.    Stroke risk factors: hypercoagulable state, hyperlipidemia, and hypertension  Prior stroke history: none.   The patient is on chronic anticoagulation.     Stat head CT    Orders:    CT head w wo contrast; Future

## 2025-06-04 NOTE — ASSESSMENT & PLAN NOTE
Current medication: Lovenox  Was placed on lovenox prior to foot surgery, previous medication warfarin   INR goal 2-3

## 2025-06-04 NOTE — ED PROVIDER NOTES
Time reflects when diagnosis was documented in both MDM as applicable and the Disposition within this note       Time User Action Codes Description Comment    6/4/2025  7:38 PM DePTory mcdaniel Add [A41.9] Sepsis (HCC)     6/4/2025  7:38 PM DePTory mcdaniel Add [M79.89] Swelling of left lower extremity     6/4/2025  7:39 PM DePTory mcdaniel Add [E87.6] Hypokalemia     6/4/2025  7:39 PM Tory Garcia Add [R41.0] Confusion           ED Disposition       ED Disposition   Admit    Condition   Stable    Date/Time   Wed Jun 4, 2025  7:38 PM    Comment   Case was discussed with SOD and the patient's admission status was agreed to be Admission Status: inpatient status.               Assessment & Plan       Medical Decision Making  ASSESSMENT: Patient is a 54 y.o. female who presents with LLE swelling > RLE, chest discomfort, confusion, tachycardia.   DDX includes but not limited to: ACS, PE, DVT, cellulitis, vasculitis, substance abuse, psychiatric disturbance  PLAN: Will do broad work up including CBC, CMP, Lactic, Trop, BNP, Coags. Imaging ordered including Vasc Duplex LLE, CT PE study. Continuous cardiac monitoring and pulse oximetry. Frequent reevaluations. See ED course for additional details.    Amount and/or Complexity of Data Reviewed  Labs: ordered. Decision-making details documented in ED Course.  Radiology: ordered. Decision-making details documented in ED Course.    Risk  Prescription drug management.  Decision regarding hospitalization.        ED Course as of 06/04/25 2009 Wed Jun 04, 2025   1820 CBC and differential  Unremarkable   1822 POCT INR(!): 3.17  Noted, no active bleeding   1823 Potassium(!): 2.8  Replacement ordered   1823 LACTIC ACID(!): 2.9  IVF bolus ordered   1823 Last echo 9/7/23 reviewed: LVEF 60%, LV function normal, RV function normal   1850 CTA chest pe study  Poor opacification of pulmonary artery limiting evaluation with no large obvious filling defect.   1909 Message sent to SOD  for admission.   1935 VAS lower limb venous duplex study, unilateral/limited  Per US Tech, negative for acute DVT   2000 Discussed results and plan with patient regarding admission. All questions answered. Patient expressed verbal understanding and is agreeable. Case discussed with ERIKA who accepted patient for admission.       Medications   potassium chloride 20 mEq IVPB (premix) (20 mEq Intravenous New Bag 6/4/25 1902)   multi-electrolyte (Plasmalyte-A/Isolyte-S PH 7.4/Normosol-R) IV bolus 1,000 mL (1,000 mL Intravenous New Bag 6/4/25 1912)     Followed by   multi-electrolyte (Plasmalyte-A/Isolyte-S PH 7.4/Normosol-R) IV bolus 1,000 mL (has no administration in time range)   iohexol (OMNIPAQUE) 350 MG/ML injection (MULTI-DOSE) 65 mL (65 mL Intravenous Given 6/4/25 1734)   potassium chloride (Klor-Con M20) CR tablet 40 mEq (40 mEq Oral Given 6/4/25 1855)   ceftriaxone (ROCEPHIN) 2 g/50 mL in dextrose IVPB (2,000 mg Intravenous New Bag 6/4/25 1923)       ED Risk Strat Scores                    No data recorded        SBIRT 20yo+      Flowsheet Row Most Recent Value   Initial Alcohol Screen: US AUDIT-C     1. How often do you have a drink containing alcohol? 0 Filed at: 06/04/2025 1618   2. How many drinks containing alcohol do you have on a typical day you are drinking?  0 Filed at: 06/04/2025 1618   3b. FEMALE Any Age, or MALE 65+: How often do you have 4 or more drinks on one occassion? 0 Filed at: 06/04/2025 1618   Audit-C Score 0 Filed at: 06/04/2025 1618   OLEG: How many times in the past year have you...    Used an illegal drug or used a prescription medication for non-medical reasons? Never Filed at: 06/04/2025 1618                            History of Present Illness       Chief Complaint   Patient presents with    Leg Swelling     Patient reports being sent from PCP for bilateral leg swelling, falling asleep all the time, and tachycardia.  Patient has extensive bruising on right arm, patient reports that  "she injected her lovenox in her arm a few days ago.       Past Medical History[1]   Past Surgical History[2]   Family History[3]   Social History[4]   E-Cigarette/Vaping    E-Cigarette Use Current Some Day User     Comments medical THC       E-Cigarette/Vaping Substances    Nicotine No     THC Yes     CBD No     Flavoring No     Other No     Unknown No       I have reviewed and agree with the history as documented.     HPI  Patient is a 54 y.o. female with PMHx substance abuse, COPD, DVT/PE on warfarin, right foot surgery, bilateral lower extremity edema, who was sent to the ED from  for evaluation of lower extremity swelling worse to the left side with tachycardia as well as AMS described as forgetfulness and confusion. Patient states she has noticed being more confused that she describes as \"spacing out\" and \"just forgetting things\". States over the past few days her LLE has been swelling more than usual with occasional chest discomfort. She injected lovenox into her right forearm but does not recall when or why and thinks she is taking her warfarin but is unsure of last dose. Denies fevers, chills, headache, dizziness, cough, dyspnea, back pain, abdominal pain, nausea, vomiting, diarrhea, dysuria, hematuria, rashes, or any other complaints or concerns at this time.    Review of Systems  All other systems reviewed and negative unless otherwise stated in HPI above.    Objective       ED Triage Vitals   Temperature Pulse Blood Pressure Respirations SpO2 Patient Position - Orthostatic VS   06/04/25 1617 06/04/25 1617 06/04/25 1617 06/04/25 1617 06/04/25 1617 06/04/25 1900   97.9 °F (36.6 °C) (!) 117 109/81 20 94 % Sitting      Temp src Heart Rate Source BP Location FiO2 (%) Pain Score    -- 06/04/25 1617 06/04/25 1900 -- 06/04/25 1617     Monitor Right arm  4      Vitals      Date and Time Temp Pulse SpO2 Resp BP Pain Score FACES Pain Rating User   06/04/25 1945 -- 112 95 % 17 123/68 -- -- AS   06/04/25 1930 -- 110 " 93 % 18 115/56 -- -- AS   06/04/25 1900 -- 108 97 % 19 114/53 -- -- AS   06/04/25 1700 -- 112 94 % 20 114/54 4 -- BG   06/04/25 1617 97.9 °F (36.6 °C) 117 94 % 20 109/81 4 -- EMR            Physical Exam  General: Awake, alert. NAD. Morbidly obese.  Head: Normocephalic, atraumatic.  Eyes: EOM-I. PERRL. No scleral icterus or conjunctival injection.  ENT: Atraumatic external nose and ears. No stridor. Normal phonation. MMM.  Neck: Symmetric, trachea midline. Supple.  CV: No murmurs. Regular rhythm and +tachycardia. 3+ pitting edema to LLE to the level of the knee. 1+ pitting edema to RLE to the level of the mid-lower leg.   Lungs: Unlabored, no accessory muscle use. No tachypnea. CTAB.  Abd: Nondistended. +BS, soft, nontender.  MSK: FROM of all extremities, no deformity.  Skin: Warm, dry. +Erythema noted to bilateral lower extremities without warmth.  Neuro: Alert to person and place (answered wrong month and year on orientation questions). No focal deficits. Strength and sensation grossly intact.    Results Reviewed       Procedure Component Value Units Date/Time    Lactic acid 2 Hours [[id number]]  (Abnormal) Collected: 06/04/25 1855    Lab Status: Final result Specimen: Blood from Arm, Left Updated: 06/04/25 1958     LACTIC ACID 3.1 mmol/L     Narrative:      Result may be elevated if tourniquet was used during collection.    HS Troponin I 2hr [[id number]]  (Normal) Collected: 06/04/25 1855    Lab Status: Final result Specimen: Blood from Arm, Left Updated: 06/04/25 1954     hs TnI 2hr 8 ng/L      Delta 2hr hsTnI 0 ng/L     Blood culture #1 [[id number]] Collected: 06/04/25 1923    Lab Status: In process Specimen: Blood from Arm, Right Updated: 06/04/25 1930    Blood culture #2 [[id number]] Collected: 06/04/25 1922    Lab Status: In process Specimen: Blood from Arm, Left Updated: 06/04/25 1929    HS Troponin I 4hr [[id number]]     Lab Status: No result Specimen: Blood     B-Type Natriuretic Peptide(BNP) [[id number]]   (Normal) Collected: 06/04/25 1645    Lab Status: Final result Specimen: Blood from Arm, Left Updated: 06/04/25 1724     BNP 30 pg/mL     HS Troponin 0hr (reflex protocol) [[id number]]  (Normal) Collected: 06/04/25 1645    Lab Status: Final result Specimen: Blood from Arm, Left Updated: 06/04/25 1722     hs TnI 0hr 8 ng/L     Comprehensive metabolic panel [[id number]]  (Abnormal) Collected: 06/04/25 1645    Lab Status: Final result Specimen: Blood from Arm, Left Updated: 06/04/25 1720     Sodium 139 mmol/L      Potassium 2.8 mmol/L      Chloride 105 mmol/L      CO2 23 mmol/L      ANION GAP 11 mmol/L      BUN 3 mg/dL      Creatinine 0.66 mg/dL      Glucose 119 mg/dL      Calcium 8.6 mg/dL      AST 35 U/L      ALT 47 U/L      Alkaline Phosphatase 83 U/L      Total Protein 6.6 g/dL      Albumin 3.6 g/dL      Total Bilirubin 0.57 mg/dL      eGFR 101 ml/min/1.73sq m     Narrative:      National Kid[employer] Foundation guidelines for Chronic Kidney Disease (CKD):     Stage 1 with normal or high GFR (GFR > 90 mL/min/1.73 square meters)    Stage 2 Mild CKD (GFR = 60-89 mL/min/1.73 square meters)    Stage 3A Moderate CKD (GFR = 45-59 mL/min/1.73 square meters)    Stage 3B Moderate CKD (GFR = 30-44 mL/min/1.73 square meters)    Stage 4 Severe CKD (GFR = 15-29 mL/min/1.73 square meters)    Stage 5 End Stage CKD (GFR <15 mL/min/1.73 square meters)  Note: GFR calculation is accurate only with a steady state creatinine    Fingerstick Glucose (POCT) [[id number]]  (Normal) Collected: 06/04/25 1720    Lab Status: Final result Specimen: Blood Updated: 06/04/25 1720     POC Glucose 128 mg/dl     Lactic acid, plasma (w/reflex if result > 2.0) [[id number]]  (Abnormal) Collected: 06/04/25 1645    Lab Status: Final result Specimen: Blood from Arm, Left Updated: 06/04/25 1719     LACTIC ACID 2.9 mmol/L     Narrative:      Result may be elevated if tourniquet was used during collection.    Protime-INR [[id number]]  (Abnormal) Collected:  06/04/25 1645    Lab Status: Final result Specimen: Blood from Arm, Left Updated: 06/04/25 1717     Protime 32.2 seconds      INR 3.17    Narrative:      INR Therapeutic Range    Indication                                             INR Range      Atrial Fibrillation                                               2.0-3.0  Hypercoagulable State                                    2.0.2.3  Left Ventricular Asist Device                            2.0-3.0  Mechanical Heart Valve                                  -    Aortic(with afib, MI, embolism, HF, LA enlargement,    and/or coagulopathy)                                     2.0-3.0 (2.5-3.5)     Mitral                                                             2.5-3.5  Prosthetic/Bioprosthetic Heart Valve               2.0-3.0  Venous thromboembolism (VTE: VT, PE        2.0-3.0    APTT [[id number]]  (Abnormal) Collected: 06/04/25 1645    Lab Status: Final result Specimen: Blood from Arm, Left Updated: 06/04/25 1717     PTT 55 seconds     CBC and differential [[id number]] Collected: 06/04/25 1645    Lab Status: Final result Specimen: Blood from Arm, Left Updated: 06/04/25 1706     WBC 10.16 Thousand/uL      RBC 4.92 Million/uL      Hemoglobin 13.8 g/dL      Hematocrit 41.7 %      MCV 85 fL      MCH 28.0 pg      MCHC 33.1 g/dL      RDW 15.1 %      MPV 10.7 fL      Platelets 211 Thousands/uL      nRBC 0 /100 WBCs      Segmented % 63 %      Immature Grans % 0 %      Lymphocytes % 24 %      Monocytes % 12 %      Eosinophils Relative 1 %      Basophils Relative 0 %      Absolute Neutrophils 6.36 Thousands/µL      Absolute Immature Grans 0.04 Thousand/uL      Absolute Lymphocytes 2.41 Thousands/µL      Absolute Monocytes 1.20 Thousand/µL      Eosinophils Absolute 0.12 Thousand/µL      Basophils Absolute 0.03 Thousands/µL             VAS lower limb venous duplex study, unilateral/limited   Final Interpretation by Kenyon Jason MD (06/04 2001)      CTA chest pe study    Final Interpretation by Miky Bajwa MD (06/04 1817)      Poor opacification of pulmonary artery limiting evaluation with no large obvious filling defect.                  Computerized Assisted Algorithm (CAA) may have aided analysis of applicable images.      Workstation performed: [id number]             Procedures    ED Medication and Procedure Management   Prior to Admission Medications   Prescriptions Last Dose Informant Patient Reported? Taking?   Aciphex 20 MG tablet  Self Yes No   Sig: Take by mouth as needed   Austedo XR 24 MG TB24  Self Yes No   Cholecalciferol (Vitamin D3) 125 MCG (5000 UT) capsule  Self No No   Sig: Take 1 capsule (5,000 Units total) by mouth daily   Invega Trinza 819 MG/2.63ML DWIGHT   Yes No   OXcarbazepine (TRILEPTAL) 150 mg tablet  Self No No   Sig: Take 3 tablets (450 mg total) by mouth every 12 (twelve) hours   albuterol (PROVENTIL HFA,VENTOLIN HFA) 90 mcg/act inhaler   Yes No   Sig: Inhale 2 puffs every 6 (six) hours as needed for wheezing   atoMOXetine (STRATTERA) 60 mg capsule  Self Yes No   Sig: Take 60 mg by mouth in the morning.   atorvastatin (LIPITOR) 80 mg tablet   No No   Sig: Take 1 tablet (80 mg total) by mouth daily   benzonatate (TESSALON PERLES) 100 mg capsule   No No   Sig: Take 1 capsule (100 mg total) by mouth 3 (three) times a day as needed for cough   Patient not taking: Reported on 5/2/2025   benztropine (COGENTIN) 1 mg tablet  Self Yes No   buPROPion (WELLBUTRIN XL) 150 mg 24 hr tablet  Self Yes No   buPROPion (WELLBUTRIN XL) 300 mg 24 hr tablet   Yes No   cloNIDine (CATAPRES) 0.3 mg tablet   No No   Sig: Take 1 tablet (0.3 mg total) by mouth 2 (two) times a day   cyanocobalamin (VITAMIN B-12) 1000 MCG tablet  Self No No   Sig: Take 1 tablet (1,000 mcg total) by mouth daily   enoxaparin (LOVENOX) 150 mg/mL injection   No No   Sig: Inject 0.9 mL (135 mg total) under the skin every 12 (twelve) hours   furosemide (LASIX) 20 mg tablet   No No   Sig: TAKE  1 TABLET BY MOUTH DAILY   levocetirizine (XYZAL) 5 MG tablet  Self No No   Sig: TAKE ONE TABLET BY MOUTH IN THE EVENING DAILY   levothyroxine 125 mcg tablet   No No   Sig: TAKE 1 TABLET BY MOUTH DAILY IN THE EARLY MORNING   losartan (COZAAR) 100 MG tablet   No No   Sig: TAKE 1 TABLET BY MOUTH DAILY   lurasidone (LATUDA) 40 mg tablet  Self Yes No   Patient not taking: Reported on 5/2/2025   metFORMIN (GLUCOPHAGE-XR) 750 mg 24 hr tablet   No No   Sig: TAKE 1 TABLET BY MOUTH DAILY WITH BREAKFAST   naltrexone (REVIA) 50 mg tablet  Self No No   Sig: TAKE 1 TABLET BY MOUTH DAILY   naproxen (Naprosyn) 500 mg tablet  Self No No   Sig: Take 1 tablet (500 mg total) by mouth 2 (two) times a day with meals Take with food.   oxybutynin (DITROPAN) 5 mg tablet  Self No No   Sig: Take 1 tablet (5 mg total) by mouth 2 (two) times a day   pantoprazole (PROTONIX) 40 mg tablet  Self No No   Sig: Take 1 tablet (40 mg total) by mouth daily in the early morning   pilocarpine (SALAGEN) 5 mg tablet  Self No No   Sig: TAKE 1 TABLET (5 MG TOTAL) BY MOUTH THREE (THREE) TIMES A DAY   rOPINIRole (REQUIP) 1 mg tablet  Self Yes No   Sig: Take by mouth daily   Patient not taking: Reported on 5/2/2025   topiramate (TOPAMAX) 200 MG tablet  Self No No   Sig: Take 1 tablet (200 mg total) by mouth 2 (two) times a day   traMADol (ULTRAM) 50 mg tablet   Yes No   traZODone (DESYREL) 100 mg tablet  Self Yes No   Patient not taking: Reported on 5/2/2025   trospium chloride (SANCTURA) 20 mg tablet  Self Yes No   Sig: Take 20 mg by mouth 2 (two) times a day   warfarin (COUMADIN) 2 mg tablet  Self No No   Sig: take 3 & 1/2 tablets (7MG) once a day Saturday and Sunday and take 2 & 1/2 tablets (5MG) once a day Monday through Friday   warfarin (COUMADIN) 5 mg tablet  Self No No   Sig: Take 1 tablet (5 mg total) by mouth daily   Patient not taking: Reported on 2/20/2025      Facility-Administered Medications: None     Patient's Medications   Discharge Prescriptions  "   No medications on file     No discharge procedures on file.  ED SEPSIS DOCUMENTATION   Time reflects when diagnosis was documented in both MDM as applicable and the Disposition within this note       Time User Action Codes Description Comment    6/4/2025  7:38 PM DePTory mcdaniel Add [A41.9] Sepsis (HCC)     6/4/2025  7:38 PM DePTory mcdaniel Add [M79.89] Swelling of left lower extremity     6/4/2025  7:39 PM DePTory mcdaniel Add [E87.6] Hypokalemia     6/4/2025  7:39 PM DePTory mcdaniel Add [R41.0] Confusion                    [1]   Past Medical History:  Diagnosis Date    Acute deep vein thrombosis of lower leg, left ([facility]) 10/16/2023    Acute heart failure, unspecified heart failure type ([facility]) 05/06/2024    Anxiety     Arthritis     oa cassandra knees    Asthma     good control- no medications    Yan's esophagus     Bipolar affective disorder ([facility])     Cannabis abuse with unspecified 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[facility])     Chronic pain disorder     lumbar    Contusion of elbow 12/21/2021    COPD (chronic obstructive pulmonary disease) ([facility])     CPAP (continuous positive airway pressure) dependence     DDD (degenerative disc disease), lumbar 04/09/2015    Degenerative disc disease at L5-S1 level     Deliberate self-cutting     9/15/22per pt has not done recently-- does see a therapist and psychiatrist regularly    Depression 09/16/2008    Diarrhea 01/06/2022    Disease of thyroid gland     hypo    MARTINEZ (dyspnea on exertion)     per pt \"has had this with exertion and not new\"    Drug overdose 10/28/2008    suicide attempt and again drug overdose 7/2022-- [facility] floor and than transferred to [facility]    Dysphagia     Dyspnea     Ecchymosis 01/06/2022    Edema     BLE    Elevated troponin 09/02/2023    Family history of blood clots     GERD (gastroesophageal reflux disease)     Grief 11/11/2023    Headache(784.0)     Headache, tension-type     Hemorrhage of rectum and anus 10/02/2017    Formatting of this note " might be different from the original.  Added automatically from request for surgery [id number]    High blood pressure     History of COVID-19 12/30/2020    Symptoms started on 12/30/2020 and then got worse.  Today she is feeling a little bit better.  She is a candidate for monoclonal antibody infusion and set for 1/6/21 @ 1pm at [facility]. 01/07/21 - telemedicine -     History of kidney stones     Hyperlipidemia     Hypertension     Ingrown toenail 12/02/2023    Intentional overdose (HCC) 09/27/2023    Intentional self-harm by unspecified sharp object, sequela (HCC) 02/09/2023    Irritable bowel syndrome     Knee pain, bilateral     Especially right    Knee pain, right 02/23/2022    Medial epicondylitis of elbow, left 04/03/2018    Formatting of this note might be different from the original.  Added automatically from request for surgery [id number]    Medial epicondylitis of right elbow 07/17/2023    Medical marijuana use     Memory difficulties 08/06/2020    Memory loss     Migraines     Muscle weakness     legs    Obesity     Other psychoactive substance abuse with unspecified psychoactive substance-induced disorder (HCC) 05/06/2024    Overactive bladder     Polysubstance abuse (HCC) 09/29/2023    Potential for cognitive impairment 06/17/2021    Primary osteoarthritis of both knees 08/08/2018    Pulmonary emboli (HCC)     Restrictive lung disease 02/01/2017    Last Assessment & Plan:   Formatting of this note might be different from the original.  I reviewed this problem throughout the rest of the note. Please refer to the other assessments for details.    Risk for falls     Sjogren's disease (HCC)     Sleep apnea     Stress incontinence     Suicidal deliberate poisoning (HCC) 07/13/2022    Suicidal ideations     Teeth missing     Toxic encephalopathy 11/08/2023    Tremor     per pt Tremors of Right Leg and both Arms    Visual impairment     Wears glasses    [2]   Past Surgical History:  Procedure Laterality Date     BREAST CYST EXCISION Right 1989    CARPAL TUNNEL RELEASE Left     CHOLECYSTECTOMY  05/2003    Laparoscopic    COLONOSCOPY      01/12/2009    DILATION AND CURETTAGE OF UTERUS      ELBOW SURGERY Left     x2. No hardware    ESOPHAGOGASTRODUODENOSCOPY      FOOT SURGERY Left     Plantar fasciotomy    HERNIA REPAIR      HYSTERECTOMY      laporoscopic, partial    KNEE ARTHROSCOPY Bilateral     OOPHORECTOMY Left 10/2015    AZ CORRJ HLX VLGS BNCTY SESMDC JOINT ARTHRODESIS Right 8/2/2024    Procedure: RIGHT FOOT LAPIDUS BUNIONECTOMY, 2ND HAMMERTOE REPAIR, SECOND METATARSAL OSTEOTOMY WITH SECOND PLANTAR PLATE REPAIR;  Surgeon: Kaz Bautista DPM;  Location: [facility];  Service: Podiatry    AZ GASTROCNEMIUS RECESSION Left 02/24/2021    Procedure: RECESSION GASTROC OPEN;  Surgeon: Nomi Yang DPM;  Location:  MAIN OR;  Service: Podiatry    AZ REMOVAL IMPLANT DEEP Right 5/14/2025    Procedure: FOOT REMOVAL OF PAINFUL HARDWARE, REVISION OF SCAR;  Surgeon: Kaz Bautista DPM;  Location: [facility];  Service: Podiatry    AZ RPR UMBILICAL HRNA 5 YRS/> REDUCIBLE N/A 04/24/2019    Procedure: REPAIR HERNIA UMBILICAL LAPAROSCOPIC W/ ROBOTICS;  Surgeon: Anahi Colon MD;  Location: [facility];  Service: General    AZ SPHINCTEROTOMY ANAL DIVISION SPHINCTER SPX N/A 08/31/2018    Procedure: EUA, LEFT PARTIAL INTERNAL SPHINCTEROTOMY;  Surgeon: Tien Reyes MD;  Location: [facility];  Service: Colorectal    AZ TNOT ELBOW LATERAL/MEDIAL DEBRIDE OPEN TDN RPR Left 09/20/2022    Procedure: RELEASE EPICONDYLAR ELBOW MEDIAL;  Surgeon: Kyree Winkler MD;  [facility];  Service: Orthopedics    REDUCTION MAMMAPLASTY Bilateral 1999    REPAIR LACERATION Left     left hand-5/18/2009    REPLACEMENT TOTAL KNEE Right     ROTATOR CUFF REPAIR Left     TONSILECTOMY AND ADNOIDECTOMY      US GUIDED MSK PROCEDURE  04/22/2021    VASCULAR SURGERY      VEIN LIGATION Bilateral     WISDOM TOOTH EXTRACTION     [3]   Family  History  Problem Relation Name Age of Onset    Kidney cancer Mother anastacio     Diabetes Mother anastacio     Depression Mother anastacio     Stroke Mother anastacio     Arthritis Mother anastacio     Cancer Mother anastacio     Psychiatric Illness Mother anastacio     No Known Problems Father      No Known Problems Sister      No Known Problems Maternal Grandmother      No Known Problems Maternal Grandfather      No Known Problems Paternal Grandmother      No Known Problems Paternal Grandfather      Colon cancer Maternal Uncle      Colon cancer Maternal Uncle      Colon cancer Paternal Uncle      Colon cancer Family fx     Alcohol abuse Sister bharti     Asthma Sister bharti    [4]   Social History  Tobacco Use    Smoking status: Former     Current packs/day: 0.00     Average packs/day: 2.0 packs/day for 33.0 years (66.0 ttl pk-yrs)     Types: Cigarettes     Start date: 1985     Quit date: 2018     Years since quittin.4    Smokeless tobacco: Never    Tobacco comments:     Started at age 16.   Vaping Use    Vaping status: Some Days    Substances: THC   Substance Use Topics    Alcohol use: Not Currently     Comment: Recovering alcoholic    Drug use: Yes     Types: Marijuana     Comment: Former meth use, medicinal marijuana(medical card)        Tory Russo DO  26

## 2025-06-04 NOTE — PROGRESS NOTES
"Name: Blanca Mason      : 1970      MRN: [id number]  Encounter Provider: JHONATAN Armando  Encounter Date: 2025   Encounter department: [facility]  :  Assessment & Plan  Altered mental status, unspecified altered mental status type  Patient information was obtained from patient.     Follow-up (Doesn't remember thing , feels disoriented, texts people things and doesn't remember )    Patient presents for evaluation of decreased mental status. Onset of symptoms was gradual starting a few days ago, with gradually worsening symptoms since that time. This episode occurred at home. The patient admits to a past history of hypertension.    Stroke risk factors: hypercoagulable state, hyperlipidemia, and hypertension  Prior stroke history: none.   The patient is on chronic anticoagulation.     Stat head CT    Orders:    CT head w wo contrast; Future    Tachycardia  125   EKG: Sinus tachycardia        History of pulmonary embolism  Current medication: Lovenox  Was placed on lovenox prior to foot surgery, previous medication warfarin   INR goal 2-3         Pain and swelling of left lower leg  (+) 4 edema with erythema, tenderness and warmth   History of DVT   Orders:    Transfer to other facility           History of Present Illness   Follow up regarding memory. Has been call/texting people often \"not making sense\". Does not recall conversations until the next day. Psych and neuro aware per patient. Altered level of consciousness.       Review of Systems   Constitutional:  Positive for activity change. Negative for chills, fatigue and fever.   HENT:  Negative for congestion, ear pain, rhinorrhea, sore throat and trouble swallowing.    Eyes:  Negative for pain and visual disturbance.   Respiratory:  Negative for cough, chest tightness and shortness of breath.    Cardiovascular:  Negative for chest pain, palpitations and leg swelling.   Gastrointestinal:  Negative for abdominal " "pain, constipation, diarrhea, nausea and vomiting.   Genitourinary:  Negative for difficulty urinating, dysuria, hematuria and urgency.   Musculoskeletal:  Negative for arthralgias and back pain.   Skin:  Negative for color change and rash.   Neurological:  Negative for dizziness, seizures, syncope and headaches.   Psychiatric/Behavioral:  Positive for behavioral problems, confusion and decreased concentration. Negative for dysphoric mood, sleep disturbance and suicidal ideas. The patient is not nervous/anxious.    All other systems reviewed and are negative.      Objective   Pulse (!) 125   Temp 97.9 °F (36.6 °C) (Temporal)   Ht 5' 6\" (1.676 m)   Wt 133 kg (294 lb)   SpO2 95%   BMI 47.45 kg/m²      Physical Exam    Cardiovascular:      Rate and Rhythm: Tachycardia present.     Musculoskeletal:      Left lower le+ Edema present.     Skin:         Neurological:      Mental Status: She is alert and oriented to person, place, and time. Mental status is at baseline.      GCS: GCS eye subscore is 4. GCS verbal subscore is 5. GCS motor subscore is 6.      Sensory: Sensation is intact.      Motor: Motor function is intact.      Coordination: Coordination is intact. Coordination normal. Finger-Nose-Finger Test normal.      Gait: Gait is intact.      Comments:  NIH Stroke Scale  Time: 3:19 PM  Person Administering Scale: JHONATAN Armando    1a  Level of consciousness: 0=alert; keenly responsive  1b. LOC questions:  0=Performs both tasks correctly  1c. LOC commands: 0=Performs both tasks correctly  2.  Best Gaze: 0=normal  3.  Visual: 0=No visual loss  4. Facial Palsy: 0=Normal symmetric movement  5a.  Motor left arm: 0=No drift, limb holds 90 (or 45) degrees for full 10 seconds  5b.  Motor right arm: 0=No drift, limb holds 90 (or 45) degrees for full 10 seconds  6a. motor left le=Some effort against gravity, limb cannot get to or maintain (if cured) 90 (or 45) degrees, drifts down to bed, but has some effort " against gravity  6b  Motor right leg:  UN = Untestable (amputation, fused joint)  7. Limb Ataxia: 0=Absent  8.  Sensory: 0=Normal; no sensory loss  9. Best Language:  0=No aphasia, normal  10. Dysarthria: 0=Normal articulation  11. Extinction and Inattention: 0=No abnormality  12. Distal motor function: 0=Normal   Total:   2       Psychiatric:         Attention and Perception: Attention and perception normal.         Mood and Affect: Mood and affect normal.         Speech: Speech normal.         Behavior: Behavior normal.         Thought Content: Thought content is not paranoid or delusional. Thought content does not include homicidal or suicidal ideation. Thought content does not include homicidal or suicidal plan.         Cognition and Memory: Cognition is impaired. Memory is impaired. She exhibits impaired recent memory.       Administrative Statements   I have spent a total time of 40 minutes in caring for this patient on the day of the visit/encounter including Diagnostic results, Prognosis, Risks and benefits of tx options, Instructions for management, Patient and family education, Importance of tx compliance, Risk factor reductions, Impressions, Counseling / Coordination of care, Documenting in the medical record, Reviewing/placing orders in the medical record (including tests, medications, and/or procedures), and Obtaining or reviewing history  .

## 2025-06-04 NOTE — ED ATTENDING ATTESTATION
I saw and evaluated the patient. I have discussed the patient with the resident physician and agree with the resident's findings, assessment and plan as documented in the resident physician's note, unless otherwise documented below. All available laboratory and imaging studies were reviewed by myself.  I was present for key portions of any procedure(s) performed by the resident and I was immediately available to provide assistance.     I agree with the current assessment done in the Emergency Department. I have conducted an independent evaluation of this patient    Final Diagnosis:  1. Sepsis (HCC)    2. Swelling of left lower extremity    3. Hypokalemia    4. Confusion            Chief Complaint   Patient presents with    Leg Swelling     Patient reports being sent from PCP for bilateral leg swelling, falling asleep all the time, and tachycardia.  Patient has extensive bruising on right arm, patient reports that she injected her lovenox in her arm a few days ago.     This is a 54 y.o. female with a history of recent DVT and PE on Warfarin, asthma, HTN, HLD, COPD, hypothyroidism, Sjogren's, presenting for evaluation of confusion. Patient says she has been falling asleep easily and feels confused. Has also had worsening bilateral lower extremity swelling, worse on the left. Had hardware removed from right foot on 5/14. Also notes increased redness to bilateral lower legs. Has occasional chest pressure. Denies fever, chills, cough, SOB, n/v/d, abdominal pain, headache, any other complaints.      PMH:   has a past medical history of Acute deep vein thrombosis of lower leg, left ([facility]) (10/16/2023), Acute heart failure, unspecified heart failure type ([facility]) (05/06/2024), Anxiety, Arthritis, Asthma, Yan's esophagus, Bipolar affective disorder ([facility]), Cannabis abuse with unspecified 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[facility]), Chronic pain disorder, Contusion of elbow (12/21/2021), COPD (chronic obstructive pulmonary disease) ([facility]),  CPAP (continuous positive airway pressure) dependence, DDD (degenerative disc disease), lumbar (04/09/2015), Degenerative disc disease at L5-S1 level, Deliberate self-cutting, Depression (09/16/2008), Diarrhea (01/06/2022), Disease of thyroid gland, MARTINEZ (dyspnea on exertion), Drug overdose (10/28/2008), Dysphagia, Dyspnea, Ecchymosis (01/06/2022), Edema, Elevated troponin (09/02/2023), Family history of blood clots, GERD (gastroesophageal reflux disease), Grief (11/11/2023), Headache(784.0), Headache, tension-type, Hemorrhage of rectum and anus (10/02/2017), High blood pressure, History of COVID-19 (12/30/2020), History of kidney stones, Hyperlipidemia, Hypertension, Ingrown toenail (12/02/2023), Intentional overdose (HCC) (09/27/2023), Intentional self-harm by unspecified sharp object, sequela (HCC) (02/09/2023), Irritable bowel syndrome, Knee pain, bilateral, Knee pain, right (02/23/2022), Medial epicondylitis of elbow, left (04/03/2018), Medial epicondylitis of right elbow (07/17/2023), Medical marijuana use, Memory difficulties (08/06/2020), Memory loss, Migraines, Muscle weakness, Obesity, Other psychoactive substance abuse with unspecified psychoactive substance-induced disorder (HCC) (05/06/2024), Overactive bladder, Polysubstance abuse (HCC) (09/29/2023), Potential for cognitive impairment (06/17/2021), Primary osteoarthritis of both knees (08/08/2018), Pulmonary emboli (HCC), Restrictive lung disease (02/01/2017), Risk for falls, Sjogren's disease (HCC), Sleep apnea, Stress incontinence, Suicidal deliberate poisoning (HCC) (07/13/2022), Suicidal ideations, Teeth missing, Toxic encephalopathy (11/08/2023), Tremor, Visual impairment, and Wears glasses.    PSH:   has a past surgical history that includes REPAIR LACERATION (Left); Colonoscopy; TONSILECTOMY AND ADNOIDECTOMY; Esophagogastroduodenoscopy; Vein ligation (Bilateral); Elbow surgery (Left); Dilation and curettage of uterus; pr sphincterotomy anal  division sphincter spx (N/A, 08/31/2018); Carpal tunnel release (Left); Knee arthroscopy (Bilateral); Cholecystectomy (05/2003); Foot surgery (Left); [place] tooth extraction; pr rpr umbilical hrna 5 yrs/> reducible (N/A, 04/24/2019); Hysterectomy; Oophorectomy (Left, 10/2015); Reduction mammaplasty (Bilateral, 1999); Rotator cuff repair (Left); pr gastrocnemius recession (Left, 02/24/2021); US guided msk procedure (04/22/2021); Breast cyst excision (Right, 1989); Replacement total knee (Right); pr tnot elbow lateral/medial debride open tdn rpr (Left, 09/20/2022); Hernia repair; Vascular surgery; pr corrj hlx vlgs bncty [facility] joint arthrodesis (Right, 8/2/2024); and pr removal implant deep (Right, 5/14/2025).    Social:  Social History     Substance and Sexual Activity   Alcohol Use Not Currently    Comment: Recovering alcoholic     Tobacco Use History[1]  Social History     Substance and Sexual Activity   Drug Use Yes    Types: Marijuana    Comment: Former meth use, medicinal marijuana(medical card)     PE:  Vitals:    06/04/25 2015 06/04/25 2200 06/04/25 2213 06/04/25 2230   BP: 109/56 97/54 97/54 99/57   BP Location:  Right arm  Right arm   Pulse: (!) 116 (!) 112  (!) 110   Resp: 19 18  18   Temp:       SpO2: 96% 96%  95%           Physical exam:  GENERAL APPEARANCE: Appears comfortable, no acute distress, calm and cooperative   NEURO: GCS 15, no gross focal deficits, cranial nerves grossly intact, clear fluent speech, no facial asymmetry   HEENT: Normocephalic, atraumatic, moist mucous membranes   Neck: Supple, full ROM  CV: RRR, no murmurs, rubs, or gallops. 1+DP and PT pulses bilaterally.   LUNGS: CTAB, no wheezing, rales, or rhonchi. No stridor.  GI: Abdomen soft, non-tender, no rebound or guarding. Ecchymosis to lower abdomen at Lovenox injection sites.     MSK: Extremities non-tender, 3+ pitting edema to left lower leg, 1+ pitting edema to right lower leg. Bilateral chronic venous stasis changes.   SKIN:  Warm and dry      Assessment and plan: This is a 54 y.o. female with a history of recent DVT and PE on Warfarin, asthma, HTN, HLD, COPD, hypothyroidism, Sjogren's, presenting for evaluation of confusion.  Also bilateral leg swelling, left worse than right.  Within ddx consider metabolic abnormality, infectious process, disease, tox effect, CHF, DVT, PE.  Will obtain workup to assess for these etiologies.    ED Course as of 06/05/25 0039 Wed Jun 04, 2025 2000 Potassium(!): 2.8       Final assessment: Potassium 2.8, given repletion.  Unclear etiology of symptoms at this point.  She does have elevated lactate, receiving IV fluids.  Given repletion of potassium.  She is also supratherapeutic on warfarin.  Plan to admit.  Remained stable throughout ED course.    Code Status: Level 1 - Full Code  Advance Directive and Living Will:      Power of :    POLST:      Medications   potassium chloride 20 mEq IVPB (premix) (20 mEq Intravenous Not Given 6/4/25 2234)   atoMOXetine (STRATTERA) capsule 60 mg (has no administration in time range)   atorvastatin (LIPITOR) tablet 80 mg (has no administration in time range)   buPROPion (WELLBUTRIN XL) 24 hr tablet 450 mg (has no administration in time range)   cholecalciferol (VITAMIN D) oral liquid 400 Units (has no administration in time range)   cloNIDine (CATAPRES) tablet 0.3 mg ( Oral Held Dose 6/10/25 1800)   cyanocobalamin (VITAMIN B-12) tablet 1,000 mcg (has no administration in time range)   furosemide (LASIX) tablet 20 mg ( Oral Held Dose 6/8/25 0900)   loratadine (CLARITIN) tablet 5 mg (has no administration in time range)   levothyroxine tablet 125 mcg (has no administration in time range)   losartan (COZAAR) tablet 100 mg ( Oral Held Dose 6/8/25 0900)   pantoprazole (PROTONIX) EC tablet 40 mg (has no administration in time range)   naproxen (NAPROSYN) tablet 500 mg (has no administration in time range)   naltrexone (REVIA) tablet 50 mg ( Oral Held Dose 6/8/25  0900)   OXcarbazepine (TRILEPTAL) tablet 450 mg (450 mg Oral Given 6/4/25 2249)   topiramate (TOPAMAX) tablet 200 mg (200 mg Oral Given 6/4/25 2249)   rOPINIRole (REQUIP) tablet 1 mg (1 mg Oral Given 6/4/25 2249)   pilocarpine (SALAGEN) tablet 5 mg (5 mg Oral Given 6/4/25 2249)   traZODone (DESYREL) tablet 50 mg ( Oral Held Dose 6/8/25 2200)   warfarin (COUMADIN) tablet 5 mg ( Oral Held Dose 6/6/25 1800)   warfarin (COUMADIN) tablet 2 mg ( Oral Held Dose 6/8/25 1800)   albuterol (PROVENTIL HFA,VENTOLIN HFA) inhaler 2 puff (has no administration in time range)   insulin lispro (HumALOG/ADMELOG) 100 units/mL subcutaneous injection 2-12 Units (has no administration in time range)   potassium chloride (Klor-Con M20) CR tablet 40 mEq (40 mEq Oral Not Given 6/5/25 0000)   multi-electrolyte (Plasmalyte-A/Isolyte-S PH 7.4/Normosol-R) IV bolus 1,000 mL (has no administration in time range)     Followed by   multi-electrolyte (Plasmalyte-A/Isolyte-S PH 7.4/Normosol-R) IV bolus 1,000 mL (has no administration in time range)   iohexol (OMNIPAQUE) 350 MG/ML injection (MULTI-DOSE) 65 mL (65 mL Intravenous Given 6/4/25 1734)   potassium chloride (Klor-Con M20) CR tablet 40 mEq (40 mEq Oral Given 6/4/25 1855)   ceftriaxone (ROCEPHIN) 2 g/50 mL in dextrose IVPB (0 mg Intravenous Stopped 6/4/25 2046)   multi-electrolyte (Plasmalyte-A/Isolyte-S PH 7.4/Normosol-R) IV bolus 1,000 mL (0 mL Intravenous Stopped 6/4/25 2153)     Followed by   multi-electrolyte (Plasmalyte-A/Isolyte-S PH 7.4/Normosol-R) IV bolus 1,000 mL (0 mL Intravenous Stopped 6/5/25 0004)   potassium chloride 20 mEq IVPB (premix) (20 mEq Intravenous New Bag 6/4/25 8738)     CT head wo contrast   Final Result      No acute intracranial hemorrhage, midline shift, or mass effect.                  Workstation performed: [id number]         VAS lower limb venous duplex study, unilateral/limited   Final Result      CTA chest pe study   Final Result      Poor opacification of  pulmonary artery limiting evaluation with no large obvious filling defect.                  Computerized Assisted Algorithm (CAA) may have aided analysis of applicable images.      Workstation performed: [id number]           Orders Placed This Encounter   Procedures    Blood culture #1    Blood culture #2    CTA chest pe study    CT head wo contrast    CBC and differential    Protime-INR    APTT    Comprehensive metabolic panel    Lactic acid, plasma (w/reflex if result > 2.0)    HS Troponin 0hr (reflex protocol)    B-Type Natriuretic Peptide(BNP)    Lactic acid 2 Hours    HS Troponin I 2hr    HS Troponin I 4hr    UA w Reflex to Microscopic w Reflex to Culture    Basic metabolic panel    CBC and differential    Protime-INR    APTT    Rapid drug screen, urine    TSH, 3rd generation    T4, free    Ammonia    Basic metabolic panel    Vitamin B12    Lactic acid, plasma (w/reflex if result > 2.0)    Vitamin B1, whole blood    Urine Microscopic    Hemoglobin A1c w/EAG Estimation (Prechecked if no A1C within 90 days)    Lactic acid 2 Hours    Diet Regular; Regular House    Fingerstick Glucose (POCT)    Continuous cardiac monitoring    Insert peripheral IV    Continuous pulse oximetry    Vital signs every 15 minutes x 4 after fluid bolus    Nursing communication Continue IV as ordered.    Notify admitting physician    Notify admitting physician on arrival    Vital Signs per unit routine    I/O    Activity as Tolerated    Out of Bed to Chair    Insert peripheral IV    Maintain IV access    Apply SCD or Foot pumps    Insulin Subcutaneous Notify Physician    Insulin Subcutaneous Instruction    Hypoglycemia Protocol    Fingerstick Glucose (POCT)    Level 1-Full Code: all life saving measures are indicated    Inpatient consult to Case Management    Cpap    ECG 12 lead    INPATIENT ADMISSION     Labs Reviewed   PROTIME-INR - Abnormal       Result Value Ref Range Status    Protime 32.2 (*) 12.3 - 15.0 seconds Final    INR 3.17 (*)  0.85 - 1.19 Final    Narrative:     INR Therapeutic Range    Indication                                             INR Range      Atrial Fibrillation                                               2.0-3.0  Hypercoagulable State                                    2.0.2.3  Left Ventricular Asist Device                            2.0-3.0  Mechanical Heart Valve                                  -    Aortic(with afib, MI, embolism, HF, LA enlargement,    and/or coagulopathy)                                     2.0-3.0 (2.5-3.5)     Mitral                                                             2.5-3.5  Prosthetic/Bioprosthetic Heart Valve               2.0-3.0  Venous thromboembolism (VTE: VT, PE        2.0-3.0   APTT - Abnormal    PTT 55 (*) 23 - 34 seconds Final    Comment: Therapeutic Heparin Range =  60-90 seconds   COMPREHENSIVE METABOLIC PANEL - Abnormal    Sodium 139  135 - 147 mmol/L Final    Potassium 2.8 (*) 3.5 - 5.3 mmol/L Final    Chloride 105  96 - 108 mmol/L Final    CO2 23  21 - 32 mmol/L Final    ANION GAP 11  4 - 13 mmol/L Final    BUN 3 (*) 5 - 25 mg/dL Final    Creatinine 0.66  0.60 - 1.30 mg/dL Final    Comment: Standardized to IDMS reference method    Glucose 119  65 - 140 mg/dL Final    Comment: If the patient is fasting, the ADA then defines impaired fasting glucose as > 100 mg/dL and diabetes as > or equal to 123 mg/dL.    Calcium 8.6  8.4 - 10.2 mg/dL Final    AST 35  13 - 39 U/L Final    ALT 47  7 - 52 U/L Final    Comment: Specimen collection should occur prior to Sulfasalazine administration due to the potential for falsely depressed results.     Alkaline Phosphatase 83  34 - 104 U/L Final    Total Protein 6.6  6.4 - 8.4 g/dL Final    Albumin 3.6  3.5 - 5.0 g/dL Final    Total Bilirubin 0.57  0.20 - 1.00 mg/dL Final    Comment: Use of this assay is not recommended for patients undergoing treatment with eltrombopag due to the potential for falsely elevated results.  N-acetyl-p-benzoquinone  imine (metabolite of Acetaminophen) will generate erroneously low results in samples for patients that have taken an overdose of Acetaminophen.    eGFR 101  ml/min/1.73sq m Final    Narrative:     National Kidney Disease Foundation guidelines for Chronic Kidney Disease (CKD):     Stage 1 with normal or high GFR (GFR > 90 mL/min/1.73 square meters)    Stage 2 Mild CKD (GFR = 60-89 mL/min/1.73 square meters)    Stage 3A Moderate CKD (GFR = 45-59 mL/min/1.73 square meters)    Stage 3B Moderate CKD (GFR = 30-44 mL/min/1.73 square meters)    Stage 4 Severe CKD (GFR = 15-29 mL/min/1.73 square meters)    Stage 5 End Stage CKD (GFR <15 mL/min/1.73 square meters)  Note: GFR calculation is accurate only with a steady state creatinine   LACTIC ACID, PLASMA (W/REFLEX IF RESULT > 2.0) - Abnormal    LACTIC ACID 2.9 (*) 0.5 - 2.0 mmol/L Final    Narrative:     Result may be elevated if tourniquet was used during collection.   LACTIC ACID 2 HOUR - Abnormal    LACTIC ACID 3.1 (*) 0.5 - 2.0 mmol/L Final    Narrative:     Result may be elevated if tourniquet was used during collection.   UA W REFLEX TO MICROSCOPIC WITH REFLEX TO CULTURE - Abnormal    Color, UA Yellow   Final    Clarity, UA Clear   Final    Specific Gravity, UA >=1.050 (*) 1.003 - 1.030 Final    pH, UA 5.5  4.5, 5.0, 5.5, 6.0, 6.5, 7.0, 7.5, 8.0 Final    Leukocytes, UA Negative  Negative Final    Nitrite, UA Positive (*) Negative Final    Protein, UA 30 (1+) (*) Negative mg/dl Final    Glucose, UA Negative  Negative mg/dl Final    Ketones, UA Negative  Negative mg/dl Final    Urobilinogen, UA <2.0  <2.0 mg/dl mg/dl Final    Bilirubin, UA Negative  Negative Final    Occult Blood, UA Negative  Negative Final   RAPID DRUG SCREEN, URINE - Abnormal    Amph/Meth UR Negative  Negative Final    Barbiturate Ur Negative  Negative Final    Benzodiazepine Urine Negative  Negative Final    Cocaine Urine Negative  Negative Final    Methadone Urine Negative  Negative Final    Opiate  "Urine Negative  Negative Final    PCP Ur Negative  Negative Final    THC Urine Positive (*) Negative Final    Oxycodone Urine Negative  Negative Final    Fentanyl Urine Negative  Negative Final    HYDROCODONE URINE Negative  Negative Final    Narrative:     Presumptive report. If requested, specimen will be sent to reference lab for confirmation.  FOR MEDICAL PURPOSES ONLY.   IF CONFIRMATION NEEDED PLEASE CONTACT THE LAB WITHIN 5 DAYS.    Drug Screen Cutoff Levels:  AMPHETAMINE/METHAMPHETAMINES  1000 ng/mL  BARBITURATES     200 ng/mL  BENZODIAZEPINES     200 ng/mL  COCAINE      300 ng/mL  METHADONE      300 ng/mL  OPIATES      300 ng/mL  PHENCYCLIDINE     25 ng/mL  THC       50 ng/mL  OXYCODONE      100 ng/mL  FENTANYL      5 ng/mL  HYDROCODONE     300 ng/mL   T4, FREE - Abnormal    Free T4 1.14 (*) 0.61 - 1.12 ng/dL Final    Comment: Specimens with biotin concentrations > 10 ng/mL can lead to significant (> 10%) positive bias in result.    Narrative:     \"Therapeutic range for patients medicated with thyroid disorders: 0.61-1.24 ng/dL.\"   BASIC METABOLIC PANEL - Abnormal    Sodium 141  135 - 147 mmol/L Final    Potassium 3.0 (*) 3.5 - 5.3 mmol/L Final    Chloride 105  96 - 108 mmol/L Final    CO2 22  21 - 32 mmol/L Final    ANION GAP 14 (*) 4 - 13 mmol/L Final    BUN 4 (*) 5 - 25 mg/dL Final    Creatinine 0.62  0.60 - 1.30 mg/dL Final    Comment: Standardized to IDMS reference method    Glucose 118  65 - 140 mg/dL Final    Comment: If the patient is fasting, the ADA then defines impaired fasting glucose as > 100 mg/dL and diabetes as > or equal to 123 mg/dL.    Calcium 8.0 (*) 8.4 - 10.2 mg/dL Final    eGFR 103  ml/min/1.73sq m Final    Narrative:     National Kidney Disease Foundation guidelines for Chronic Kidney Disease (CKD):     Stage 1 with normal or high GFR (GFR > 90 mL/min/1.73 square meters)    Stage 2 Mild CKD (GFR = 60-89 mL/min/1.73 square meters)    Stage 3A Moderate CKD (GFR = 45-59 mL/min/1.73 " "square meters)    Stage 3B Moderate CKD (GFR = 30-44 mL/min/1.73 square meters)    Stage 4 Severe CKD (GFR = 15-29 mL/min/1.73 square meters)    Stage 5 End Stage CKD (GFR <15 mL/min/1.73 square meters)  Note: GFR calculation is accurate only with a steady state creatinine   VITAMIN B12 - Abnormal    Vitamin B-12 1,523 (*) 180 - 914 pg/mL Final   LACTIC ACID, PLASMA (W/REFLEX IF RESULT > 2.0) - Abnormal    LACTIC ACID 3.4 (*) 0.5 - 2.0 mmol/L Final    Narrative:     Result may be elevated if tourniquet was used during collection.   URINE MICROSCOPIC - Abnormal    RBC, UA None Seen  None Seen, 1-2 /hpf Final    WBC, UA None Seen  None Seen, 1-2 /hpf Final    Epithelial Cells Moderate (*) None Seen, Occasional /hpf Final    Bacteria, UA None Seen  None Seen, Occasional /hpf Final    MUCUS THREADS Occasional (*) None Seen Final   HEMOGLOBIN A1C - Abnormal    Hemoglobin A1C 6.1 (*) Normal 4.0-5.6%; PreDiabetic 5.7-6.4%; Diabetic >=6.5%; Glycemic control for adults with diabetes <7.0% % Final      mg/dl Final   POCT BLOOD GAS (CG8+) - Abnormal    pH, Art i-STAT 7.437  7.350 - 7.450 Final    pCO2, Art i-STAT 29.4 (*) 36.0 - 44.0 mm HG Final    pO2, ART i-STAT 66.0 (*) 75.0 - 129.0 mm HG Final    BE, i-STAT -3 (*) -2 - 3 mmol/L Final    HCO3, Art i-STAT 19.9 (*) 22.0 - 28.0 mmol/L Final    CO2, i-STAT 21  21 - 32 mmol/L Final    O2 Sat, i-STAT 94 (*) 60 - 85 % Final    SODIUM, I-STAT 138  136 - 145 mmol/l Final    Potassium, i-STAT 3.0 (*) 3.5 - 5.3 mmol/L Final    Calcium, Ionized i-STAT 1.10 (*) 1.12 - 1.32 mmol/L Final    Hct, i-STAT 41  34.8 - 46.1 % Final    Hgb, i-STAT 13.9  11.5 - 15.4 g/dl Final    Glucose, i-STAT 117  65 - 140 mg/dl Final    Specimen Type ARTERIAL   Final   HS TROPONIN I 0HR - Normal    hs TnI 0hr 8  \"Refer to ACS Flowchart\"- see link ng/L Final    Comment:                                              Initial (time 0) result  If >=50 ng/L, Myocardial injury suggested ;  Type of myocardial " "injury and treatment strategy  to be determined.  If 5-49 ng/L, a delta result at 2 hours will be needed to further evaluate.  If <4 ng/L, and chest pain has been >3 hours since onset, patient may qualify for discharge based on the HEART score in the ED.  If <5 ng/L and <3hours since onset of chest pain, a delta result at 2 hours will be needed to further evaluate.    HS Troponin 99th Percentile URL of a Health Population=12 ng/L with a 95% Confidence Interval of 8-18 ng/L.    Second Troponin (time 2 hours)  If calculated delta >= 20 ng/L,  Myocardial injury suggested ; Type of myocardial injury and treatment strategy to be determined.  If 5-49 ng/L and the calculated delta is 5-19 ng/L, consult medical service for evaluation.  Continue evaluation for ischemia on ecg and other possible etiology and repeat hs troponin at 4 hours.  If delta is <5 ng/L at 2 hours, consider discharge based on risk stratification via the HEART score (if in ED), or DAVID risk score in IP/Observation.    HS Troponin 99th Percentile URL of a Health Population=12 ng/L with a 95% Confidence Interval of 8-18 ng/L.   B-TYPE NATRIURETIC PEPTIDE (BNP) - Normal    BNP 30  0 - 100 pg/mL Final   HS TROPONIN I 2HR - Normal    hs TnI 2hr 8  \"Refer to ACS Flowchart\"- see link ng/L Final    Comment:                                              Initial (time 0) result  If >=50 ng/L, Myocardial injury suggested ;  Type of myocardial injury and treatment strategy  to be determined.  If 5-49 ng/L, a delta result at 2 hours will be needed to further evaluate.  If <4 ng/L, and chest pain has been >3 hours since onset, patient may qualify for discharge based on the HEART score in the ED.  If <5 ng/L and <3hours since onset of chest pain, a delta result at 2 hours will be needed to further evaluate.    HS Troponin 99th Percentile URL of a Health Population=12 ng/L with a 95% Confidence Interval of 8-18 ng/L.    Second Troponin (time 2 hours)  If calculated delta " ">= 20 ng/L,  Myocardial injury suggested ; Type of myocardial injury and treatment strategy to be determined.  If 5-49 ng/L and the calculated delta is 5-19 ng/L, consult medical service for evaluation.  Continue evaluation for ischemia on ecg and other possible etiology and repeat hs troponin at 4 hours.  If delta is <5 ng/L at 2 hours, consider discharge based on risk stratification via the HEART score (if in ED), or DAVID risk score in IP/Observation.    HS Troponin 99th Percentile URL of a Health Population=12 ng/L with a 95% Confidence Interval of 8-18 ng/L.    Delta 2hr hsTnI 0  <20 ng/L Final   HS TROPONIN I 4HR - Normal    hs TnI 4hr 7  \"Refer to ACS Flowchart\"- see link ng/L Final    Comment:                                              Initial (time 0) result  If >=50 ng/L, Myocardial injury suggested ;  Type of myocardial injury and treatment strategy  to be determined.  If 5-49 ng/L, a delta result at 2 hours will be needed to further evaluate.  If <4 ng/L, and chest pain has been >3 hours since onset, patient may qualify for discharge based on the HEART score in the ED.  If <5 ng/L and <3hours since onset of chest pain, a delta result at 2 hours will be needed to further evaluate.    HS Troponin 99th Percentile URL of a Health Population=12 ng/L with a 95% Confidence Interval of 8-18 ng/L.    Second Troponin (time 2 hours)  If calculated delta >= 20 ng/L,  Myocardial injury suggested ; Type of myocardial injury and treatment strategy to be determined.  If 5-49 ng/L and the calculated delta is 5-19 ng/L, consult medical service for evaluation.  Continue evaluation for ischemia on ecg and other possible etiology and repeat hs troponin at 4 hours.  If delta is <5 ng/L at 2 hours, consider discharge based on risk stratification via the HEART score (if in ED), or DAVID risk score in IP/Observation.    HS Troponin 99th Percentile URL of a Health Population=12 ng/L with a 95% Confidence Interval of 8-18 ng/L.    " Delta 4hr hsTnI -1  <20 ng/L Final   TSH, 3RD GENERATION - Normal    TSH 3RD GENERATON 3.307  0.450 - 4.500 uIU/mL Final    Comment: The recommended reference ranges for TSH during pregnancy are as follows:   First trimester 0.100 to 2.500 uIU/mL   Second trimester  0.200 to 3.000 uIU/mL   Third trimester 0.300 to 3.000 uIU/m    Note: Normal ranges may not apply to patients who are transgender, non-binary, or whose legal sex, sex at birth, and gender identity differ.  Adult TSH (3rd generation) reference range follows the recommended guidelines of the [employer] Thy[employer], January, 2020.   AMMONIA - Normal    Ammonia 41  18 - 72 umol/L Final   POCT GLUCOSE - Normal    POC Glucose 128  65 - 140 mg/dl Final   BLOOD CULTURE    Blood Culture Received in Microbiology Lab. Culture in Progress.   Preliminary   BLOOD CULTURE    Blood Culture Received in Microbiology Lab. Culture in Progress.   Preliminary   CBC AND DIFFERENTIAL    WBC 10.16  4.31 - 10.16 Thousand/uL Final    RBC 4.92  3.81 - 5.12 Million/uL Final    Hemoglobin 13.8  11.5 - 15.4 g/dL Final    Hematocrit 41.7  34.8 - 46.1 % Final    MCV 85  82 - 98 fL Final    MCH 28.0  26.8 - 34.3 pg Final    MCHC 33.1  31.4 - 37.4 g/dL Final    RDW 15.1  11.6 - 15.1 % Final    MPV 10.7  8.9 - 12.7 fL Final    Platelets 211  149 - 390 Thousands/uL Final    nRBC 0  /100 WBCs Final    Segmented % 63  43 - 75 % Final    Immature Grans % 0  0 - 2 % Final    Lymphocytes % 24  14 - 44 % Final    Monocytes % 12  4 - 12 % Final    Eosinophils Relative 1  0 - 6 % Final    Basophils Relative 0  0 - 1 % Final    Absolute Neutrophils 6.36  1.85 - 7.62 Thousands/µL Final    Absolute Immature Grans 0.04  0.00 - 0.20 Thousand/uL Final    Absolute Lymphocytes 2.41  0.60 - 4.47 Thousands/µL Final    Absolute Monocytes 1.20  0.17 - 1.22 Thousand/µL Final    Eosinophils Absolute 0.12  0.00 - 0.61 Thousand/µL Final    Basophils Absolute 0.03  0.00 - 0.10 Thousands/µL Final   VITAMIN B1,  WHOLE BLOOD   LACTIC ACID 2 HOUR         Time reflects when diagnosis was documented in both MDM as applicable and the Disposition within this note       Time User Action Codes Description Comment    6/4/2025  7:38 PM Tory Garcia Add [A41.9] Sepsis (HCC)     6/4/2025  7:38 PM Tory Garcia Add [M79.89] Swelling of left lower extremity     6/4/2025  7:39 PM Tory Garcia Add [E87.6] Hypokalemia     6/4/2025  7:39 PM Tory Garcia Add [R41.0] Confusion           ED Disposition       ED Disposition   Admit    Condition   Stable    Date/Time   Wed Jun 4, 2025  7:38 PM    Comment   Case was discussed with SOD and the patient's admission status was agreed to be Admission Status: inpatient status.               Follow-up Information    None       Patient's Medications   Discharge Prescriptions    No medications on file     No discharge procedures on file.  Prior to Admission Medications   Prescriptions Last Dose Informant Patient Reported? Taking?   Aciphex 20 MG tablet  Self Yes No   Sig: Take by mouth as needed   Austedo XR 24 MG TB24  Self Yes No   Cholecalciferol (Vitamin D3) 125 MCG (5000 UT) capsule  Self No No   Sig: Take 1 capsule (5,000 Units total) by mouth daily   Invega Trinza 819 MG/2.63ML DWIGHT   Yes No   OXcarbazepine (TRILEPTAL) 150 mg tablet  Self No No   Sig: Take 3 tablets (450 mg total) by mouth every 12 (twelve) hours   albuterol (PROVENTIL HFA,VENTOLIN HFA) 90 mcg/act inhaler   Yes No   Sig: Inhale 2 puffs every 6 (six) hours as needed for wheezing   atoMOXetine (STRATTERA) 60 mg capsule  Self Yes No   Sig: Take 60 mg by mouth in the morning.   atorvastatin (LIPITOR) 80 mg tablet   No No   Sig: Take 1 tablet (80 mg total) by mouth daily   benzonatate (TESSALON PERLES) 100 mg capsule   No No   Sig: Take 1 capsule (100 mg total) by mouth 3 (three) times a day as needed for cough   Patient not taking: Reported on 5/2/2025   benztropine (COGENTIN) 1 mg tablet  Self Yes No   buPROPion  (WELLBUTRIN XL) 150 mg 24 hr tablet  Self Yes No   buPROPion (WELLBUTRIN XL) 300 mg 24 hr tablet   Yes No   cloNIDine (CATAPRES) 0.3 mg tablet   No No   Sig: Take 1 tablet (0.3 mg total) by mouth 2 (two) times a day   cyanocobalamin (VITAMIN B-12) 1000 MCG tablet  Self No No   Sig: Take 1 tablet (1,000 mcg total) by mouth daily   enoxaparin (LOVENOX) 150 mg/mL injection   No No   Sig: Inject 0.9 mL (135 mg total) under the skin every 12 (twelve) hours   furosemide (LASIX) 20 mg tablet   No No   Sig: TAKE 1 TABLET BY MOUTH DAILY   levocetirizine (XYZAL) 5 MG tablet  Self No No   Sig: TAKE ONE TABLET BY MOUTH IN THE EVENING DAILY   levothyroxine 125 mcg tablet   No No   Sig: TAKE 1 TABLET BY MOUTH DAILY IN THE EARLY MORNING   losartan (COZAAR) 100 MG tablet   No No   Sig: TAKE 1 TABLET BY MOUTH DAILY   lurasidone (LATUDA) 40 mg tablet  Self Yes No   Patient not taking: Reported on 5/2/2025   metFORMIN (GLUCOPHAGE-XR) 750 mg 24 hr tablet   No No   Sig: TAKE 1 TABLET BY MOUTH DAILY WITH BREAKFAST   naltrexone (REVIA) 50 mg tablet  Self No No   Sig: TAKE 1 TABLET BY MOUTH DAILY   naproxen (Naprosyn) 500 mg tablet  Self No No   Sig: Take 1 tablet (500 mg total) by mouth 2 (two) times a day with meals Take with food.   oxybutynin (DITROPAN) 5 mg tablet  Self No No   Sig: Take 1 tablet (5 mg total) by mouth 2 (two) times a day   pantoprazole (PROTONIX) 40 mg tablet  Self No No   Sig: Take 1 tablet (40 mg total) by mouth daily in the early morning   pilocarpine (SALAGEN) 5 mg tablet  Self No No   Sig: TAKE 1 TABLET (5 MG TOTAL) BY MOUTH THREE (THREE) TIMES A DAY   rOPINIRole (REQUIP) 1 mg tablet 6/3/2025 Self Yes Yes   Sig: Take by mouth daily   topiramate (TOPAMAX) 200 MG tablet  Self No No   Sig: Take 1 tablet (200 mg total) by mouth 2 (two) times a day   traMADol (ULTRAM) 50 mg tablet   Yes No   traZODone (DESYREL) 100 mg tablet 6/4/2025 Self Yes Yes   trospium chloride (SANCTURA) 20 mg tablet 6/4/2025 Self Yes Yes   Sig:  "Take 20 mg by mouth in the morning and 20 mg in the evening.   warfarin (COUMADIN) 2 mg tablet Past Week Self No Yes   Sig: take 3 & 1/2 tablets (7MG) once a day Saturday and  and take 2 & 1/2 tablets (5MG) once a day Monday through Friday   warfarin (COUMADIN) 5 mg tablet 2025 Self No Yes   Sig: Take 1 tablet (5 mg total) by mouth daily      Facility-Administered Medications: None         Portions of the record may have been created with voice recognition software. Occasional wrong word or \"sound a like\" substitutions may have occurred due to the inherent limitations of voice recognition software. Read the chart carefully and recognize, using context, where substitutions have occurred.    Electronically signed by:  Linda William           [1]   Social History  Tobacco Use   Smoking Status Former    Current packs/day: 0.00    Average packs/day: 2.0 packs/day for 33.0 years (66.0 ttl pk-yrs)    Types: Cigarettes    Start date: 1985    Quit date: 2018    Years since quittin.4   Smokeless Tobacco Never   Tobacco Comments    Started at age 16.     "

## 2025-06-04 NOTE — LETTER
[facility] MED SURG 7  [address]  [place] [address]  Dept: [phone]    June 6, 2025     Patient: Blanca Mason   YOB: 1970   Date of Visit: 6/4/2025       To Whom it May Concern:    Blanca Mason is under my professional care. She was seen in the hospital from 6/4/2025 to 06/06/25. She may return to work on 6/9/25 without limitations.    If you have any questions or concerns, please don't hesitate to call.         Sincerely,          Can Brooks, DO

## 2025-06-05 PROBLEM — M79.89 PAIN AND SWELLING OF LEFT LOWER LEG: Status: ACTIVE | Noted: 2025-06-05

## 2025-06-05 PROBLEM — M79.662 PAIN AND SWELLING OF LEFT LOWER LEG: Status: ACTIVE | Noted: 2025-06-05

## 2025-06-05 PROBLEM — E87.20 LACTIC ACIDOSIS: Status: RESOLVED | Noted: 2025-06-04 | Resolved: 2025-06-05

## 2025-06-05 LAB
ANION GAP SERPL CALCULATED.3IONS-SCNC: 11 MMOL/L (ref 4–13)
APTT PPP: 63 SECONDS (ref 23–34)
ATRIAL RATE: 107 BPM
ATRIAL RATE: 112 BPM
ATRIAL RATE: 192 BPM
BASOPHILS # BLD AUTO: 0.04 THOUSANDS/ÂΜL (ref 0–0.1)
BASOPHILS NFR BLD AUTO: 1 % (ref 0–1)
BUN SERPL-MCNC: 3 MG/DL (ref 5–25)
CALCIUM SERPL-MCNC: 7.2 MG/DL (ref 8.4–10.2)
CHLORIDE SERPL-SCNC: 109 MMOL/L (ref 96–108)
CO2 SERPL-SCNC: 20 MMOL/L (ref 21–32)
CREAT SERPL-MCNC: 0.43 MG/DL (ref 0.6–1.3)
EOSINOPHIL # BLD AUTO: 0.15 THOUSAND/ÂΜL (ref 0–0.61)
EOSINOPHIL NFR BLD AUTO: 2 % (ref 0–6)
ERYTHROCYTE [DISTWIDTH] IN BLOOD BY AUTOMATED COUNT: 15.4 % (ref 11.6–15.1)
EST. AVERAGE GLUCOSE BLD GHB EST-MCNC: 128 MG/DL
GFR SERPL CREATININE-BSD FRML MDRD: 116 ML/MIN/1.73SQ M
GLUCOSE SERPL-MCNC: 112 MG/DL (ref 65–140)
GLUCOSE SERPL-MCNC: 123 MG/DL (ref 65–140)
GLUCOSE SERPL-MCNC: 154 MG/DL (ref 65–140)
GLUCOSE SERPL-MCNC: 159 MG/DL (ref 65–140)
HBA1C MFR BLD: 6.1 %
HCT VFR BLD AUTO: 36.3 % (ref 34.8–46.1)
HGB BLD-MCNC: 11.7 G/DL (ref 11.5–15.4)
IMM GRANULOCYTES # BLD AUTO: 0.04 THOUSAND/UL (ref 0–0.2)
IMM GRANULOCYTES NFR BLD AUTO: 1 % (ref 0–2)
INR PPP: 3.66 (ref 0.85–1.19)
LACTATE SERPL-SCNC: 1.3 MMOL/L (ref 0.5–2)
LACTATE SERPL-SCNC: 3.4 MMOL/L (ref 0.5–2)
LYMPHOCYTES # BLD AUTO: 2.18 THOUSANDS/ÂΜL (ref 0.6–4.47)
LYMPHOCYTES NFR BLD AUTO: 27 % (ref 14–44)
MAGNESIUM SERPL-MCNC: 1.8 MG/DL (ref 1.9–2.7)
MCH RBC QN AUTO: 28 PG (ref 26.8–34.3)
MCHC RBC AUTO-ENTMCNC: 32.2 G/DL (ref 31.4–37.4)
MCV RBC AUTO: 87 FL (ref 82–98)
MONOCYTES # BLD AUTO: 0.93 THOUSAND/ÂΜL (ref 0.17–1.22)
MONOCYTES NFR BLD AUTO: 12 % (ref 4–12)
NEUTROPHILS # BLD AUTO: 4.66 THOUSANDS/ÂΜL (ref 1.85–7.62)
NEUTS SEG NFR BLD AUTO: 57 % (ref 43–75)
NRBC BLD AUTO-RTO: 0 /100 WBCS
P AXIS: 53 DEGREES
P AXIS: 56 DEGREES
PLATELET # BLD AUTO: 190 THOUSANDS/UL (ref 149–390)
PMV BLD AUTO: 10.3 FL (ref 8.9–12.7)
POTASSIUM SERPL-SCNC: 3.3 MMOL/L (ref 3.5–5.3)
PR INTERVAL: 164 MS
PR INTERVAL: 184 MS
PROTHROMBIN TIME: 35.8 SECONDS (ref 12.3–15)
QRS AXIS: -34 DEGREES
QRS AXIS: -50 DEGREES
QRS AXIS: -56 DEGREES
QRSD INTERVAL: 66 MS
QRSD INTERVAL: 84 MS
QRSD INTERVAL: 88 MS
QT INTERVAL: 318 MS
QT INTERVAL: 350 MS
QT INTERVAL: 424 MS
QTC INTERVAL: 424 MS
QTC INTERVAL: 477 MS
QTC INTERVAL: 589 MS
RBC # BLD AUTO: 4.18 MILLION/UL (ref 3.81–5.12)
SODIUM SERPL-SCNC: 140 MMOL/L (ref 135–147)
T WAVE AXIS: 0 DEGREES
T WAVE AXIS: 29 DEGREES
T WAVE AXIS: 55 DEGREES
VENTRICULAR RATE: 107 BPM
VENTRICULAR RATE: 112 BPM
VENTRICULAR RATE: 116 BPM
WBC # BLD AUTO: 8 THOUSAND/UL (ref 4.31–10.16)

## 2025-06-05 PROCEDURE — 85610 PROTHROMBIN TIME: CPT

## 2025-06-05 PROCEDURE — 93005 ELECTROCARDIOGRAM TRACING: CPT

## 2025-06-05 PROCEDURE — 83605 ASSAY OF LACTIC ACID: CPT

## 2025-06-05 PROCEDURE — 85025 COMPLETE CBC W/AUTO DIFF WBC: CPT

## 2025-06-05 PROCEDURE — 93010 ELECTROCARDIOGRAM REPORT: CPT | Performed by: INTERNAL MEDICINE

## 2025-06-05 PROCEDURE — 36415 COLL VENOUS BLD VENIPUNCTURE: CPT

## 2025-06-05 PROCEDURE — 80048 BASIC METABOLIC PNL TOTAL CA: CPT

## 2025-06-05 PROCEDURE — 82948 REAGENT STRIP/BLOOD GLUCOSE: CPT

## 2025-06-05 PROCEDURE — 83036 HEMOGLOBIN GLYCOSYLATED A1C: CPT

## 2025-06-05 PROCEDURE — 99232 SBSQ HOSP IP/OBS MODERATE 35: CPT | Performed by: INTERNAL MEDICINE

## 2025-06-05 PROCEDURE — 83735 ASSAY OF MAGNESIUM: CPT

## 2025-06-05 PROCEDURE — 85730 THROMBOPLASTIN TIME PARTIAL: CPT

## 2025-06-05 PROCEDURE — 99223 1ST HOSP IP/OBS HIGH 75: CPT | Performed by: PSYCHIATRY & NEUROLOGY

## 2025-06-05 RX ORDER — SODIUM CHLORIDE, SODIUM GLUCONATE, SODIUM ACETATE, POTASSIUM CHLORIDE, MAGNESIUM CHLORIDE, SODIUM PHOSPHATE, DIBASIC, AND POTASSIUM PHOSPHATE .53; .5; .37; .037; .03; .012; .00082 G/100ML; G/100ML; G/100ML; G/100ML; G/100ML; G/100ML; G/100ML
1000 INJECTION, SOLUTION INTRAVENOUS ONCE
Status: COMPLETED | OUTPATIENT
Start: 2025-06-05 | End: 2025-06-05

## 2025-06-05 RX ORDER — POTASSIUM CHLORIDE 14.9 MG/ML
20 INJECTION INTRAVENOUS
Status: DISPENSED | OUTPATIENT
Start: 2025-06-05 | End: 2025-06-05

## 2025-06-05 RX ORDER — CEFAZOLIN SODIUM 2 G/50ML
2000 SOLUTION INTRAVENOUS EVERY 8 HOURS
Status: DISCONTINUED | OUTPATIENT
Start: 2025-06-05 | End: 2025-06-06 | Stop reason: HOSPADM

## 2025-06-05 RX ORDER — CLONIDINE HYDROCHLORIDE 0.1 MG/1
0.3 TABLET ORAL DAILY
Status: DISCONTINUED | OUTPATIENT
Start: 2025-06-06 | End: 2025-06-06 | Stop reason: HOSPADM

## 2025-06-05 RX ORDER — POTASSIUM CHLORIDE 14.9 MG/ML
20 INJECTION INTRAVENOUS
Status: COMPLETED | OUTPATIENT
Start: 2025-06-05 | End: 2025-06-05

## 2025-06-05 RX ORDER — POTASSIUM CHLORIDE 1500 MG/1
40 TABLET, EXTENDED RELEASE ORAL ONCE
Status: DISCONTINUED | OUTPATIENT
Start: 2025-06-05 | End: 2025-06-06 | Stop reason: HOSPADM

## 2025-06-05 RX ADMIN — PILOCARPINE HYDRCHLORIDE 5 MG: 5 TABLET, FILM COATED ORAL at 17:44

## 2025-06-05 RX ADMIN — OXCARBAZEPINE 450 MG: 300 TABLET, FILM COATED ORAL at 22:31

## 2025-06-05 RX ADMIN — POTASSIUM CHLORIDE 20 MEQ: 14.9 INJECTION, SOLUTION INTRAVENOUS at 11:12

## 2025-06-05 RX ADMIN — CEFAZOLIN SODIUM 2000 MG: 2 SOLUTION INTRAVENOUS at 22:36

## 2025-06-05 RX ADMIN — BUPROPION HYDROCHLORIDE 450 MG: 150 TABLET, EXTENDED RELEASE ORAL at 09:28

## 2025-06-05 RX ADMIN — NAPROXEN 500 MG: 500 TABLET ORAL at 09:28

## 2025-06-05 RX ADMIN — POTASSIUM CHLORIDE 20 MEQ: 200 INJECTION, SOLUTION INTRAVENOUS at 08:46

## 2025-06-05 RX ADMIN — LORATADINE 5 MG: 10 TABLET ORAL at 08:46

## 2025-06-05 RX ADMIN — ATORVASTATIN CALCIUM 80 MG: 80 TABLET, FILM COATED ORAL at 08:46

## 2025-06-05 RX ADMIN — ROPINIROLE 1 MG: 1 TABLET, FILM COATED ORAL at 22:31

## 2025-06-05 RX ADMIN — CYANOCOBALAMIN TAB 500 MCG 1000 MCG: 500 TAB at 08:46

## 2025-06-05 RX ADMIN — SODIUM CHLORIDE, SODIUM GLUCONATE, SODIUM ACETATE, POTASSIUM CHLORIDE, MAGNESIUM CHLORIDE, SODIUM PHOSPHATE, DIBASIC, AND POTASSIUM PHOSPHATE 1000 ML: .53; .5; .37; .037; .03; .012; .00082 INJECTION, SOLUTION INTRAVENOUS at 00:46

## 2025-06-05 RX ADMIN — TOPIRAMATE 150 MG: 100 TABLET, FILM COATED ORAL at 17:44

## 2025-06-05 RX ADMIN — SODIUM CHLORIDE, SODIUM GLUCONATE, SODIUM ACETATE, POTASSIUM CHLORIDE, MAGNESIUM CHLORIDE, SODIUM PHOSPHATE, DIBASIC, AND POTASSIUM PHOSPHATE 1000 ML: .53; .5; .37; .037; .03; .012; .00082 INJECTION, SOLUTION INTRAVENOUS at 02:43

## 2025-06-05 RX ADMIN — CEFAZOLIN SODIUM 2000 MG: 2 SOLUTION INTRAVENOUS at 13:57

## 2025-06-05 RX ADMIN — LEVOTHYROXINE SODIUM 125 MCG: 0.12 TABLET ORAL at 08:46

## 2025-06-05 RX ADMIN — POTASSIUM CHLORIDE 20 MEQ: 14.9 INJECTION, SOLUTION INTRAVENOUS at 02:45

## 2025-06-05 RX ADMIN — POTASSIUM CHLORIDE 20 MEQ: 14.9 INJECTION, SOLUTION INTRAVENOUS at 05:11

## 2025-06-05 RX ADMIN — INSULIN LISPRO 2 UNITS: 100 INJECTION, SOLUTION INTRAVENOUS; SUBCUTANEOUS at 16:42

## 2025-06-05 RX ADMIN — NAPROXEN 500 MG: 500 TABLET ORAL at 16:43

## 2025-06-05 NOTE — ASSESSMENT & PLAN NOTE
Maintained on warfarin with goal INR 2-3, was briefly on Lovenox injections before and after right foot/ankle orthopedic surgery.  She sees [facility] for anticoagulation management.  Supratherapeutic INR on presentation, will hold warfarin.  Can resume home dosing when INR downtrends/at goal.

## 2025-06-05 NOTE — ASSESSMENT & PLAN NOTE
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  UDS + for THC.

## 2025-06-05 NOTE — ASSESSMENT & PLAN NOTE
Lactic acidosis of unclear origin with initial 2.9, repeat 3.1.  No clear source of infection, not meeting SIRS criteria.  No clear evidence of endorgan dysfunction given that patient has been having confusion for multiple months.  No signs of limb or mesenteric ischemia.  Per chart review patient was at 1 point on metformin but is unclear what she is taking now.  Will also check B1 level.   Complete 2 L sepsis bolus ordered by ED and recheck lactic acid.  Post sepsis bolus, lactic acid now no longer elevated at 1.3.

## 2025-06-05 NOTE — ASSESSMENT & PLAN NOTE
Maintained on warfarin with goal INR 2-3, was briefly on Lovenox injections before and after right foot/ankle orthopedic surgery.  She sees [facility] for anticoagulation management.  Supratherapeutic INR on presentation, will hold warfarin.  Can resume home dosing when INR downtrends/at goal.

## 2025-06-05 NOTE — ASSESSMENT & PLAN NOTE
Patient reports confusion for approximately 2 months noticed by herself friends and family.  Her friends described as how she normally acts when she smokes marijuana but patient reports not smoking much marijuana recently.  She also reports deficits in short-term memory and executive functioning.  States she has been having some gait difficulties with possible increase in urinary frequency.  Nonfocal neurologic exam.  Broad differential.  CT head to evaluate for intracranial bleed given altered mental status and supratherapeutic INR as well as to evaluate for normal pressure hydrocephalus.  ABG for hypercapnia.  TSH and T4 test for endocrine abnormalities.  Will also check B12.  UA to assess for UTI.  UDS to assess for intoxication.  Believe the most likely cause of her symptoms is some combination of medication side effects given her extensive medication list and her polypharmacy.  Patient unable to recall what medication she takes as they come in a bubble pack.  Recommend medication reconciliation with pharmacy when pharmacy is open.  Consider psych consult given patient's unclear psychiatric history and multiple psychiatric medications which may be worsening her confusion.  CT head clear, ABG + lactic acidosis, but lactic acid resolved. Thyroid function clear. B12 clear. UA shows + nitrites. UDS + THC.   Septicemia less likely given does not meet SIRS criteria (only tachy to 100).  Per Dr. Quigley w/ psych, topiramate is most likely pharmacologic factor contributing to confusion, will wean ~20%/day. Will cont hold trazadone, latuda, austedo, and cogentin.  Trazodone can be restarted if insomnia if mental status improved.  Will not discharge on latuda, austedo, cogentin, given they are likely unnecessary with Invega SNOWDEN.   MRI brain.  Arrange outpt f/u w/ her psychiatrist.

## 2025-06-05 NOTE — ASSESSMENT & PLAN NOTE
On home losartan 100, clonidine 0.3 twice daily.  Blood pressure here normotensive, can consider holding home losartan.  Continue clonidine to prevent rebound hypertension.

## 2025-06-05 NOTE — ASSESSMENT & PLAN NOTE
MAG on CPAP at home reports previous good compliance with intermittent compliance in the last 2 months.  Didn't have CPAP 6/4-6/5  CPAP while here, pt's father brought 6/5.

## 2025-06-05 NOTE — ASSESSMENT & PLAN NOTE
Lactic acidosis of unclear origin with initial 2.9, repeat 3.1.  No clear source of infection, not meeting SIRS criteria.  No clear evidence of endorgan dysfunction given that patient has been having confusion for multiple months.  No signs of limb or mesenteric ischemia.  Per chart review patient was at 1 point on metformin but is unclear what she is taking now.  Will also check B1 level.   Complete 2 L sepsis bolus ordered by ED and recheck lactic acid.

## 2025-06-05 NOTE — ASSESSMENT & PLAN NOTE
"Patient on extensive psychiatric medications per chart review and unclear psychiatric history.  Patient is unable to give a definitive answer about which of these she is prescribed and is taking.  Recommend med rec with pharmacy during the day.  Polypharmacy may be playing into her confusion.  See \"Confusion\" plan  "

## 2025-06-05 NOTE — UTILIZATION REVIEW
NOTIFICATION OF INPATIENT ADMISSION   AUTHORIZATION REQUEST   SERVICING FACILITY:   Formerly [facility], later [facility]  Address: [address] [place] [address]  Tax ID: [id number]  NPI: [id number] ATTENDING PROVIDER:  Attending Name and NPI#: Nicko Meadows Md [[id number]]  Address: [address] [place] [address]  Phone: [phone]   ADMISSION INFORMATION:  Place of Service: Inpatient [facility]  Place of Service Code: 21  Inpatient Admission Date/Time: 6/4/25  8:00 PM  Discharge Date/Time: No discharge date for patient encounter.  Admitting Diagnosis Code/Description:  Confusion [R41.0]  Edema [R60.9]     UTILIZATION REVIEW CONTACT:  Yeison Cristobal Utilization   Network Utilization Review Department  Phone: [phone]  Fax: [phone]  Email: [email or web address]  Contact for approvals/pending authorizations, clinical reviews, and discharge.     PHYSICIAN ADVISORY SERVICES:  Medical Necessity Denial & Vbpn-px-Zlok Review  Phone: [phone]  Fax: [phone]  Email: [email or web address]     DISCHARGE SUPPORT TEAM:  For Patients Discharge Needs & Updates  Phone: [phone] opt. 2 Fax: [phone]  Email: [email or web address]

## 2025-06-05 NOTE — ASSESSMENT & PLAN NOTE
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  Will obtain UDS.

## 2025-06-05 NOTE — CONSULTS
Consultation - Behavioral Health   Blanca Mason 54 y.o. female MRN: [id number]  Unit/Bed#: DIS05 Encounter: [id number]    Assessment & Plan  Schizoaffective disorder, bipolar type (HCC)  Blanca Mason is a 54 y.o. female with past psychiatric history of Schizoaffective disorder, bipolar type, Anxiety, Depression, BPD, polysubstance abuse, past suicide attempts, and multiple inpatient U admissions, and past medical history of DVT/PE on warfarin, MAG on CPAP, BL LE edema, Sjogren's syndrome, GERD, restless leg syndrome, asthma, COPD, chronic back and neck pain and recent right ankle orthopedic surgery 5/14 ,who is admitted to [facility] on 06/04/2025 due to self reported confusion for approx 2 months, also noticed by family and friends. Psychiatry was consulted for altered mental status, cognitive impairment and borderline personality disorder.     At this time patient appears to be doing well from a behavioral health perspective.  She is calm cooperative and quite pleasant upon approach.  Patient's only concern is confusion which again has been lasting a couple of months.  I informed patient that my recommendation at this time after reviewing her psychiatric medications is to first taper her down off of the Topamax as confusion and fogginess is a known side effect.  I would aim to decrease this medication by approximately 20 %/day in order to decrease likelihood of provoking a seizure.  Patient can be maintained on her atomoxetine Wellbutrin and Trileptal as well as naltrexone.  She is also on Invega SNOWDEN and followed by the  [facility] ACT team.  I would at this time not continue Latuda (patient reports not being on this medication at home), Austedo or Cogentin.  Patient is on an SNOWDEN Invega as maintenance for her thought disorder and given that I do not feel that the risk versus benefits of the Austedo and Cogentin are in favor of these medications given that SNOWDEN's are less likely to result in EPS and tardive  dyskinesia and I wish to reduce polypharmacy.  Other than the aforementioned adjustments, patient has no other acute psychiatric needs and does not need psychiatric hospitalization.    -Taper Topamax by 20 %/day until complete discontinuation  -Continue Strattera 60 mg daily  -Continue Wellbutrin 450 mg XL daily  -Continue naltrexone 50 mg daily  -Continue Trileptal 450 mg every 12 hrs.  -Patient received SNOWDEN of Invega in early May and is due for follow-up injection in the next couple of days, would reach out to ACT team as patient can get this injection as an outpatient unless they would prefer us to give the patient injection here  -Would resume trazodone on a as needed basis starting with 50 mg as needed at bedtime tonight for insomnia and if no worsening of mental status patient can resume home dose of trazodone on discharge  - Would hold Cogentin, Austedo, and latuda and would not resume them on discharge    -Patient does not meet criteria for inpatient psychiatric mission at this time  -No further acute psychiatric needs currently.  -Psychiatry to sign off at this time.  Please reach out as necessary.    Confusion        Asthma    Yan's esophagus determined by biopsy    Benign essential hypertension    MAG (obstructive sleep apnea)    Overactive bladder    Major depressive disorder    Sjogren's syndrome (HCC)    COPD (chronic obstructive pulmonary disease) (HCC)    Substance abuse (HCC)    Bilateral leg edema    History of pulmonary embolism    Restless leg syndrome    History of DVT (deep vein thrombosis)    Deep vein thrombosis (DVT) of lower extremity (HCC)    PE (pulmonary thromboembolism) (HCC)    Hypothyroid    GERD (gastroesophageal reflux disease)    Lactic acidosis         Diagnoses, available treatment options, alternatives to treatment, and risks vs. benefits of current psychiatric treatment plan were discussed with the patient.  Prior records were reviewed in [facility].  The case was discussed with  "the primary team.    Scheduled medications:  Current Facility-Administered Medications   Medication Dose Route Frequency Provider Last Rate    albuterol  2 puff Inhalation Q6H PRN Salomón Pérez DO      atoMOXetine  60 mg Oral Daily Ricky Jason MD      atorvastatin  80 mg Oral Daily Ricky Jason MD      buPROPion  450 mg Oral Daily Ricky Jason MD      cefazolin  2,000 mg Intravenous Q8H Karuna Saenz DO      cholecalciferol  400 Units Oral Daily Ricky Jason MD      [Held by provider] cloNIDine  0.3 mg Oral BID Ricky Jason MD      cyanocobalamin  1,000 mcg Oral Daily Ricky Jason MD      [Held by provider] furosemide  20 mg Oral Daily Ricky Jason MD      insulin lispro  2-12 Units Subcutaneous TID AC Ricky Jason MD      levothyroxine  125 mcg Oral Early Morning Ricky Jason MD      loratadine  5 mg Oral Daily Ricky Jason MD      [Held by provider] losartan  100 mg Oral Daily Ricky Jason MD      [Held by provider] naltrexone  50 mg Oral Daily Ricky Jason MD      naproxen  500 mg Oral BID With Meals Ricky Jason MD      OXcarbazepine  450 mg Oral Q12H [facility] Ricky aJson MD      pantoprazole  40 mg Oral Early Morning Ricky Jason MD      pilocarpine  5 mg Oral BID Ricky Jason MD      potassium chloride  40 mEq Oral Once Salomón Pérez DO      potassium chloride  40 mEq Oral Once Ricky Jason MD      potassium chloride  20 mEq Intravenous Q2H Karuna Saenz DO 20 mEq (06/05/25 1112)    rOPINIRole  1 mg Oral HS Ricky Jason MD      topiramate  150 mg Oral BID Karuna Saenz DO      [Held by provider] traZODone  50 mg Oral HS Ricky Jason MD      [Held by provider] warfarin  2 mg Oral Daily (warfarin) Ricky Jason MD      [Held by provider] warfarin  5 mg Oral Daily (warfarin) Ricky Jason MD          PRN:    albuterol    Chief Complaint: \"I'm confused\"    History of Present Illness   Physician Requesting Consult: Nicko" "MD Abdiel  Reason for Consult / Principal Problem: AMS, BPD, cognitive impairment    Blanca Mason is a 54 y.o. female with past psychiatric history of Schizoaffective disorder, bipolar type, Anxiety, Depression, BPD, polysubstance abuse, past suicide attempts, and multiple inpatient [facility] admissions, and past medical history of DVT/PE on warfarin, MAG on CPAP, BL LE edema, Sjogren's syndrome, GERD, restless leg syndrome, asthma, COPD, chronic back and neck pain and recent right ankle orthopedic surgery 5/14 ,who is admitted to [facility] on 06/04/2025 due to self reported confusion for approx 2 months, also noticed by family and friends. Psychiatry was consulted for altered mental status, cognitive impairment and borderline personality disorder.     On initial psychiatric evaluation, patient was reclined in armchair, in hospital attire, and calm and cooperative with interview. Patient did appear mildly anxious consistently shaking her feet. Patient reports mood is \"good.\" Patient's dad was in the room and remained throughout interview. Patient shares that her dad is a good support, along with her cat, and friends.  Patient reports she has had increased confusion lately. Per chart review, this is likely secondary to polypharmacy. 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 Patient is upset that her job will go out of business soon. Patient is future oriented and shares that she will look for a new job.    Patient reports living alone in [place] with her cat. Patient has visits from  ACT twice a week and does DBT with them every Monday. Patient denies access to firearms at home. Patient reports sleep is poor in the hospital due to not having CPAP machine, but is adequate at home. Patient reports appetite has been poor, " but no significant weight changes. Patient does endorse decreased interest in usual activities, including gem art and guitar over the last year.  She however denies SI HI or AVH and otherwise appears affectively bright with good variability.  Patient's highest level of education is a GED.     Per chart review, patient has extensive psychiatric history, with multiple inpatient behavioral health unit admissions. Patient's most recent admission was from 12/02/2023 to 02/02/2024 at [facility] on a voluntary 201 commitment for suicide attempt by overdose. Patient was discharged on Strattera 40 mg daily for ADHD, Wellbutrin 450 mg daily for mood, Clonidine 0.2 mg twice daily for impulsivity, Melatonin 3 mg nightly for insomnia, Naltrexone 50 mg daily for impulsivity, Trileptal 450 mg twice daily for mood stabilization, and SNOWDEN Invega Sustenna 234 mg every 4 weeks.    On further chart review, patient had 8 additional  admissions at [facility] from 11/10- 11/17/2023,  9/28-10/06/2023,  07/15- 07/26/2022, 01/05- 01/10/2022, 12/21- 12/23/2021, 07/06-07/12/21, 05/22-05/30/2019, and 09/11- 09/19/2018. Patient is connected with [facility].       Psychiatric Review Of Systems:  Medication side effects: yes, confusion  Sleep: no change, CPAP overnight  Appetite: yes, decreased  Hygiene: able to tend to instrumental and basic ADLs  Anxiety Symptoms: endorses  Psychotic Symptoms: denies  Depression Symptoms: endorses some decreased interest in prior activities but otherwise no significantly low mood   Manic Symptoms: denies  PTSD Symptoms: denies  Suicidal Thoughts: denies  Homicidal Thoughts: denies      Historical Information   Past Psychiatric History  Diagnoses: Schizoaffective disorder, bipolar type, Anxiety, Depression, BPD, polysubstance abuse, past suicide attempts  Medication History: Strattera 40 mg daily for ADHD, Wellbutrin 450 mg daily for mood, Clonidine 0.2 mg twice daily for impulsivity,  Melatonin 3 mg nightly for insomnia, Naltrexone 50 mg daily for impulsivity, Trileptal 450 mg twice daily for mood stabilization, and SNOWDEN Invega Sustenna 234 mg every 4 weeks.  Inpatient: 9 past admissions at [facility] from 2023 - 2024, 11/10- 2023,  -10/06/2023,  07/15- 2022, - 01/10/2022, - 2021, -21, -2019, and - 2018  Outpatient: [facility] twice a week, DBT every Monday  Suicide Attempts: multiple, drug overdose 10/2008 and 2022   Self Injurious Behavior: denies currently    Substance Abuse History:    Social History     Substance and Sexual Activity   Alcohol Use Not Currently    Comment: Recovering alcoholic     Social History     Substance and Sexual Activity   Drug Use Yes    Types: Marijuana    Comment: Former meth use, medicinal marijuana(medical card)         I discussed substance abuse with the patient and, if pertinent, discussed risks vs benefits of decreasing frequency of use.    Family Psychiatric History:   Family History[1]      Social History  Highest education: GED  Currently living: with cat in [place]  Relationships: dad, uncle, friend  Children: none  Occupation: works at [employer] twice a week  Gun Ownership: denies       Rest of social history as per below:  Social History     Socioeconomic History    Marital status: Single     Spouse name: Not on file    Number of children: Not on file    Years of education: Not on file    Highest education level: Not on file   Occupational History    Not on file   Tobacco Use    Smoking status: Former     Current packs/day: 0.00     Average packs/day: 2.0 packs/day for 33.0 years (66.0 ttl pk-yrs)     Types: Cigarettes     Start date: 1985     Quit date: 2018     Years since quittin.4    Smokeless tobacco: Never    Tobacco comments:     Started at age 16.   Vaping Use    Vaping status: Some Days    Substances: THC   Substance and Sexual  Activity    Alcohol use: Not Currently     Comment: Recovering alcoholic    Drug use: Yes     Types: Marijuana     Comment: Former meth use, medicinal marijuana(medical card)    Sexual activity: Not on file     Comment: defer   Other Topics Concern    Not on file   Social History Narrative    Not on file     Social Drivers of Health     Financial Resource Strain: Low Risk  (2/6/2024)    Overall Financial Resource Strain (CARDIA)     Difficulty of Paying Living Expenses: Not hard at all   Food Insecurity: No Food Insecurity (5/6/2024)    Nursing - Inadequate Food Risk Classification     Worried About Running Out of Food in the Last Year: Never true     Ran Out of Food in the Last Year: Never true     Ran Out of Food in the Last Year: Not on file   Transportation Needs: No Transportation Needs (5/6/2024)    PRAPARE - Transportation     Lack of Transportation (Medical): No     Lack of Transportation (Non-Medical): No   Physical Activity: Not on file   Stress: Not on file   Social Connections: Not on file   Intimate Partner Violence: Not on file   Housing Stability: Unknown (5/6/2024)    Housing Stability Vital Sign     Unable to Pay for Housing in the Last Year: No     Number of Times Moved in the Last Year: Not on file     Homeless in the Last Year: No         Traumatic History:   Abuse: deferred  Other Traumatic Events: deferred    Medical Review Of Systems:  Review of Systems -   All other systems were reviewed and are negative    Relevant PMH/PSH:   Past Medical History[2]  Past Surgical History[3]      Meds/Allergies   all current active meds have been reviewed  Allergies[4]    Objective   Vital signs in last 24 hours:  Temp:  [97.4 °F (36.3 °C)-97.9 °F (36.6 °C)] 97.4 °F (36.3 °C)  HR:  [] 106  BP: ()/(51-81) 127/57  Resp:  [17-20] 20  SpO2:  [93 %-97 %] 95 %  O2 Device: None (Room air)  Nasal Cannula O2 Flow Rate (L/min):  [2 L/min] 2 L/min      Intake/Output Summary (Last 24 hours) at 6/5/2025  "1134  Last data filed at 6/5/2025 0853  Gross per 24 hour   Intake 0 ml   Output 0 ml   Net 0 ml       Mental Status Evaluation:    Appearance: hospital attire, appears consistent with stated age  Motor: no psychomotor disturbances, shaking feet with anxiety  Behavior: cooperative, interacts with this writer appropriately  Speech: normal rate, rhythm, and volume  Mood: \"good\"  Affect: euthymic, normal range and intensity  Thought Process: organized, linear, and goal-oriented  Thought Content: denies delusions, denies paranoia  Perception: Denies AVH  Risk Potential: denies suicidal ideation, plan, or intent. Denies homicidal ideation  Sensorium: Oriented to person, place, month, year, and situation pt did not get day correct (stated it was the 8th)  Cognition: 7 Quarters in $1.75  Consciousness: alert and awake  Attention: MARLEY backwards w/o error, 3/3 on immediate and 1/3 delayed recall (3/3 on delayed w/ clues)  Insight: fair  Judgement: fair  Fund of Knowledge: vocabulary appropriate for age  Gait/Station: did not assess   Muscle Tone/Strength: did not assess      Lab Results:    I have personally reviewed all pertinent laboratory/tests results.    CBC  Lab Results   Component Value Date    WBC 8.00 06/05/2025    RBC 4.18 06/05/2025    HGB 11.7 06/05/2025    HCT 36.3 06/05/2025    MCH 28.0 06/05/2025    MCV 87 06/05/2025     06/05/2025    RDW 15.4 (H) 06/05/2025       BMP  Lab Results   Component Value Date    K 3.3 (L) 06/05/2025     (H) 06/05/2025    CO2 20 (L) 06/05/2025    BUN 3 (L) 06/05/2025       LFTs  Lab Results   Component Value Date    ALB 3.6 06/04/2025    TP 6.6 06/04/2025    TBILI 0.57 06/04/2025       TSH  Lab Results   Component Value Date    TSH 6.13 (H) 03/15/2022       COVID-19  Lab Results   Component Value Date    SARSCOV2 Negative 03/12/2025       UDS  Lab Results   Component Value Date    AMPMETHUR Negative 06/04/2025    BARBTUR Negative 06/04/2025    BDZUR Negative 06/04/2025 "    THCUR Positive (A) 06/04/2025    COCAINEUR Negative 06/04/2025    METHADONEUR Negative 06/04/2025    OPIATEUR Negative 06/04/2025    PCPUR Negative 06/04/2025       Medical Alcohol Level  Lab Results   Component Value Date    ETOH <10 12/01/2023       EKG    Encounter Date: 02/17/25   ECG 12 lead   Result Value    Ventricular Rate 76    Atrial Rate 76    NC Interval 198    QRSD Interval 100    QT Interval 376    QTC Interval 423    P Axis 51    QRS Axis -22    T Wave Axis 53    Narrative    Normal sinus rhythm  Normal ECG  When compared with ECG of 21-Jan-2025 09:25,  No significant change was found  Confirmed by Kenyon Banerjee ([id number]) on 2/19/2025 7:47:29 AM       Code Status: Level 1 - Full Code    Counseling / Coordination of Care  I have spent a total time of 45 minutes on 06/05/25 in caring for this patient including reviewing the chart, interviewing the patient, documenting in the medical record and discussing with the primary team.    Axel Fry MD         [1]   Family History  Problem Relation Name Age of Onset    Kidney cancer Mother anastacio     Diabetes Mother anastacio     Depression Mother anastacio     Stroke Mother anastacio     Arthritis Mother anastacio     Cancer Mother anastacio     Psychiatric Illness Mother anastacio     No Known Problems Father      No Known Problems Sister      No Known Problems Maternal Grandmother      No Known Problems Maternal Grandfather      No Known Problems Paternal Grandmother      No Known Problems Paternal Grandfather      Colon cancer Maternal Uncle      Colon cancer Maternal Uncle      Colon cancer Paternal Uncle      Colon cancer Family fx     Alcohol abuse Sister bharti     Asthma Sister bharti    [2]   Past Medical History:  Diagnosis Date    Acute deep vein thrombosis of lower leg, left (HCC) 10/16/2023    Acute heart failure, unspecified heart failure type (HCC) 05/06/2024    Anxiety     Arthritis     oa cassandra knees    Asthma     good control- no medications     "Yan's esophagus     Bipolar affective disorder (HCC)     Cannabis abuse with unspecified 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HCC)     Chronic pain disorder     lumbar    Contusion of elbow 12/21/2021    COPD (chronic obstructive pulmonary disease) (HCC)     CPAP (continuous positive airway pressure) dependence     DDD (degenerative disc disease), lumbar 04/09/2015    Degenerative disc disease at L5-S1 level     Deliberate self-cutting     9/15/22per pt has not done recently-- does see a therapist and psychiatrist regularly    Depression 09/16/2008    Diarrhea 01/06/2022    Disease of thyroid gland     hypo    MARTINEZ (dyspnea on exertion)     per pt \"has had this with exertion and not new\"    Drug overdose 10/28/2008    suicide attempt and again drug overdose 7/2022--[facility]-Medical floor and than transferred to [facility]    Dysphagia     Dyspnea     Ecchymosis 01/06/2022    Edema     BLE    Elevated troponin 09/02/2023    Family history of blood clots     GERD (gastroesophageal reflux disease)     Grief 11/11/2023    Headache(784.0)     Headache, tension-type     Hemorrhage of rectum and anus 10/02/2017    Formatting of this note might be different from the original.  Added automatically from request for surgery [id number]    High blood pressure     History of COVID-19 12/30/2020    Symptoms started on 12/30/2020 and then got worse.  Today she is feeling a little bit better.  She is a candidate for monoclonal antibody infusion and set for 1/6/21 @ 1pm at [facility]. 01/07/21 - telemedicine -     History of kidney stones     Hyperlipidemia     Hypertension     Ingrown toenail 12/02/2023    Intentional overdose ([facility]) 09/27/2023    Intentional self-harm by unspecified sharp object, sequela (HCC) 02/09/2023    Irritable bowel syndrome     Knee pain, bilateral     Especially right    Knee pain, right 02/23/2022    Medial epicondylitis of elbow, left 04/03/2018    Formatting of this note might be different from the original.  " Added automatically from request for surgery [id number]    Medial epicondylitis of right elbow 07/17/2023    Medical marijuana use     Memory difficulties 08/06/2020    Memory loss     Migraines     Muscle weakness     legs    Obesity     Other psychoactive substance abuse with unspecified psychoactive substance-induced disorder (HCC) 05/06/2024    Overactive bladder     Polysubstance abuse (HCC) 09/29/2023    Potential for cognitive impairment 06/17/2021    Primary osteoarthritis of both knees 08/08/2018    Pulmonary emboli (HCC)     Restrictive lung disease 02/01/2017    Last Assessment & Plan:   Formatting of this note might be different from the original.  I reviewed this problem throughout the rest of the note. Please refer to the other assessments for details.    Risk for falls     Sjogren's disease (HCC)     Sleep apnea     Stress incontinence     Suicidal deliberate poisoning (HCC) 07/13/2022    Suicidal ideations     Teeth missing     Toxic encephalopathy 11/08/2023    Tremor     per pt Tremors of Right Leg and both Arms    Visual impairment     Wears glasses    [3]   Past Surgical History:  Procedure Laterality Date    BREAST CYST EXCISION Right 1989    CARPAL TUNNEL RELEASE Left     CHOLECYSTECTOMY  05/2003    Laparoscopic    COLONOSCOPY      01/12/2009    DILATION AND CURETTAGE OF UTERUS      ELBOW SURGERY Left     x2. No hardware    ESOPHAGOGASTRODUODENOSCOPY      FOOT SURGERY Left     Plantar fasciotomy    HERNIA REPAIR      HYSTERECTOMY      laporoscopic, partial    KNEE ARTHROSCOPY Bilateral     OOPHORECTOMY Left 10/2015    AK CORRJ HLX VLGS BNCTY [facility] JOINT ARTHRODESIS Right 8/2/2024    Procedure: RIGHT FOOT LAPIDUS BUNIONECTOMY, 2ND HAMMERTOE REPAIR, SECOND METATARSAL OSTEOTOMY WITH SECOND PLANTAR PLATE REPAIR;  Surgeon: Kaz Bautista DPM;  Location:  MAIN OR;  Service: Podiatry    AK GASTROCNEMIUS RECESSION Left 02/24/2021    Procedure: RECESSION GASTROC OPEN;  Surgeon: Nomi Yang  "TOMMY;  Location:  MAIN OR;  Service: Podiatry    DE REMOVAL IMPLANT DEEP Right 5/14/2025    Procedure: FOOT REMOVAL OF PAINFUL HARDWARE, REVISION OF SCAR;  Surgeon: Kaz Bautista DPM;  Location: [facility] MAIN OR;  Service: Podiatry    DE RPR UMBILICAL HRNA 5 YRS/> REDUCIBLE N/A 04/24/2019    Procedure: REPAIR HERNIA UMBILICAL LAPAROSCOPIC W/ ROBOTICS;  Surgeon: Anahi Colon MD;  Location: [facility] Main OR;  Service: General    DE SPHINCTEROTOMY ANAL DIVISION SPHINCTER SPX N/A 08/31/2018    Procedure: EUA, LEFT PARTIAL INTERNAL SPHINCTEROTOMY;  Surgeon: Tien Reyes MD;  Location: [facility] MAIN OR;  Service: Colorectal    DE TNOT ELBOW LATERAL/MEDIAL DEBRIDE OPEN TDN RPR Left 09/20/2022    Procedure: RELEASE EPICONDYLAR ELBOW MEDIAL;  Surgeon: Kyree Winkler MD;  Location: [facility] MAIN OR;  Service: Orthopedics    REDUCTION MAMMAPLASTY Bilateral 1999    REPAIR LACERATION Left     left hand-5/18/2009    REPLACEMENT TOTAL KNEE Right     ROTATOR CUFF REPAIR Left     TONSILECTOMY AND ADNOIDECTOMY      US GUIDED MSK PROCEDURE  04/22/2021    VASCULAR SURGERY      VEIN LIGATION Bilateral     WISDOM TOOTH EXTRACTION     [4]   Allergies  Allergen Reactions    Percocet [Oxycodone-Acetaminophen] Headache     Severe headaches   (denies issues with Tylenol)    Povidone Iodine Rash     Unsure if betadine or gauze post surgical. Got rash on leg.   Has  Had itchy rashes after every surgery prep and IV insertion    Chlorhexidine Rash     Per pt \"able to use the liquid soap--thinkd reaction from the sponges or wipes\"     "

## 2025-06-05 NOTE — ASSESSMENT & PLAN NOTE
(+) 4 edema with erythema, tenderness and warmth   History of DVT   Orders:    Transfer to other facility

## 2025-06-05 NOTE — ASSESSMENT & PLAN NOTE
Leg slightly warm but no convincing signs of cellulitis or active infection.  Negative lower extremity Doppler this admission.  Continue home Lasix.  Likely venous stasis, will start Ancef 2000mg q8 to address possibility of cellulitis.

## 2025-06-05 NOTE — ASSESSMENT & PLAN NOTE
Maintained on warfarin with goal INR 2-3, was briefly on Lovenox injections before and after right foot/ankle orthopedic surgery.  She sees [facility] for anticoagulation management.  Supratherapeutic INR on presentation, will hold warfarin.  Can resume home dosing when INR downtrends/at goal.

## 2025-06-05 NOTE — ASSESSMENT & PLAN NOTE
Maintained on warfarin with goal INR 2-3, was briefly on Lovenox injections before and after right foot/ankle orthopedic surgery.  She sees [facility] for anticoagulation management.  Supratherapeutic INR on presentation, will hold warfarin.  Can resume home dosing when INR downtrends/at goal.

## 2025-06-05 NOTE — ASSESSMENT & PLAN NOTE
Unsure if she truly has asthma, outpatient maintained on as needed albuterol which we will continue inpatient.  No PFTs on file.  Not in exacerbation on presentation.

## 2025-06-05 NOTE — PROGRESS NOTES
"    INTERNAL MEDICINE RESIDENCY SENIOR ADMISSION NOTE     Name: Blanca Mason   Age & Sex: 54 y.o. female   MRN: [id number]  Unit/Bed#: ED 19   Encounter: [id number]  Primary Care Provider: JHONATAN Armando    Admit to team: SOD Team B     Patient seen and examined. Reviewed H&P per Dr. Pérez . Agree with the assessment and plan with any exception/addition as noted below:    54-year-old female patient with past medical history of substance abuse (quit meth 2 years ago), MAG noncompliant with CPAP, COPD, GERD with Yan's esophagus, hypertension, dyslipidemia, restless leg syndrome, prediabetes, depression, anxiety, bipolar disorder, PE on Coumadin, chronic DJD of multiple joints, status post right TKA, status post RLE hardware removal on 5/14, status  post C4-6 RFA presents to ED after being sent in by PCP due to AMS.      Patient claims that for the past 2 months she has been feeling confused, often forgetting the day of the week, culminating in an episode where she had, \"3 bowls of soup in her sink,\" and when questioned as to why by her father, she could not remember. On arrival to ED, VS T97.9, , RR 20, /81, saturating 97% on room air.  Labs showing supratherapeutic INR 3.17, hypokalemia 2.8, lactic acidosis 2.9,  ABG showing AGMA w/ combined respiratory alkalosis, unremarkable CBC, trops WNL. On physical exam, patient was AAOx2, aware of self and location, but could not tell me the year, current president, or weekday. Patient endorsed difficulty walking, but could be due to recent hardware removal in RLE. Examined surgical incision site. Clean, dry, non erythematous, nontender and not swollen. Obvious tremor most prominently in RLE, and BL LE edema. I did not appreciate asymmetrical leg swelling. Lungs CTA BL, no JVD present. Pt denied recent drug use, recent falls/head strike, chest pain, SOB, fevers, chills, nausea, vomiting, dizziness, lethargy, numbness/tingling, blurry vision, " palpitations, new rashes, diarrhea, constipation.    Given persistent sinus tachy, ED obtained CTA PE, which did not demonstrate large PE.     Currently, differential list remains broad, and etiology of lactic acidosis is unclear. ED favored infectious etiology w possible cellulitis of LLE and possible septicemia and treated empirically with rocephin and 1 L isolyte. Patient is on trazodone for pain/insomnia which could cause AMS, and further differentials include substance abuse, Stroke, polypharmacy, NPH, UTI, thyroid disturbance, less likely vascular dementia, hypomanic episode (does not meet full criteria for hypomania -- no goal directed activity, as of now, no involvement in activity that have high probability of harmful consequences or inflated grandiosity).     Work up will include CTH, UDS, UA, TFTs. Bedside POCUS demonstrated hyperdynamic thickened LV.     Plan:  Continue Rocephin 1g daily  Replete K+  Trend Lactate   Hold warfarin for supratherapeutic INR  Hold Trazadone  F/U UA, Blood cultures  F/U CT Head  F/U TFT, B12  Rest of care per H&P    Code Status: Level 1 - Full Code  Admission Status: INPATIENT   Disposition: Patient requires Med/Surg

## 2025-06-05 NOTE — ASSESSMENT & PLAN NOTE
Leg slightly warm but no convincing signs of cellulitis or active infection.  Negative lower extremity Doppler this admission.  Continue home Lasix.

## 2025-06-05 NOTE — ASSESSMENT & PLAN NOTE
MAG on CPAP at home reports previous good compliance with intermittent compliance in the last 2 months.  CPAP while here

## 2025-06-05 NOTE — PLAN OF CARE
Problem: PAIN - ADULT  Goal: Verbalizes/displays adequate comfort level or baseline comfort level  Description: Interventions:  - Encourage patient to monitor pain and request assistance  - Assess pain using appropriate pain scale  - Administer analgesics as ordered based on type and severity of pain and evaluate response  - Implement non-pharmacological measures as appropriate and evaluate response  - Consider cultural and social influences on pain and pain management  - Notify physician/advanced practitioner if interventions unsuccessful or patient reports new pain  - Educate patient/family on pain management process including their role and importance of  reporting pain   - Provide non-pharmacologic/complimentary pain relief interventions  Outcome: Progressing     Problem: INFECTION - ADULT  Goal: Absence or prevention of progression during hospitalization  Description: INTERVENTIONS:  - Assess and monitor for signs and symptoms of infection  - Monitor lab/diagnostic results  - Monitor all insertion sites, i.e. indwelling lines, tubes, and drains  - Monitor endotracheal if appropriate and nasal secretions for changes in amount and color  - [place] appropriate cooling/warming therapies per order  - Administer medications as ordered  - Instruct and encourage patient and family to use good hand hygiene technique  - Identify and instruct in appropriate isolation precautions for identified infection/condition  Outcome: Progressing  Goal: Absence of fever/infection during neutropenic period  Description: INTERVENTIONS:  - Monitor WBC  - Perform strict hand hygiene  - Limit to healthy visitors only  - No plants, dried, fresh or silk flowers with prasad in patient room  Outcome: Progressing     Problem: SAFETY ADULT  Goal: Patient will remain free of falls  Description: INTERVENTIONS:  - Educate patient/family on patient safety including physical limitations  - Instruct patient to call for assistance with activity   -  Consider consulting OT/PT to assist with strengthening/mobility based on AM PAC & JH-HLM score  - Consult OT/PT to assist with strengthening/mobility   - Keep Call bell within reach  - Keep bed low and locked with side rails adjusted as appropriate  - Keep care items and personal belongings within reach  - Initiate and maintain comfort rounds  - Make Fall Risk Sign visible to staff  - Offer Toileting every 2 Hours, in advance of need  - Initiate/Maintain bed alarm  - Apply yellow socks and bracelet for high fall risk patients  - Consider moving patient to room near nurses station  Outcome: Progressing  Goal: Maintain or return to baseline ADL function  Description: INTERVENTIONS:  -  Assess patient's ability to carry out ADLs; assess patient's baseline for ADL function and identify physical deficits which impact ability to perform ADLs (bathing, care of mouth/teeth, toileting, grooming, dressing, etc.)  - Assess/evaluate cause of self-care deficits   - Assess range of motion  - Assess patient's mobility; develop plan if impaired  - Assess patient's need for assistive devices and provide as appropriate  - Encourage maximum independence but intervene and supervise when necessary  - Involve family in performance of ADLs  - Assess for home care needs following discharge   - Consider OT consult to assist with ADL evaluation and planning for discharge  - Provide patient education as appropriate  - Monitor functional capacity and physical performance, use of AM PAC & JH-HLM   - Monitor gait, balance and fatigue with ambulation    Outcome: Progressing  Goal: Maintains/Returns to pre admission functional level  Description: INTERVENTIONS:  - Perform AM-PAC 6 Click Basic Mobility/ Daily Activity assessment daily.  - Set and communicate daily mobility goal to care team and patient/family/caregiver.   - Collaborate with rehabilitation services on mobility goals if consulted  - Perform Range of Motion 3 times a day.  -  Reposition patient every 2 hours.  - Dangle patient 3 times a day  - Stand patient 3 times a day  - Ambulate patient 3 times a day  - Out of bed to chair 3 times a day   - Out of bed for meals 3 times a day  - Out of bed for toileting  - Record patient progress and toleration of activity level   Outcome: Progressing     Problem: DISCHARGE PLANNING  Goal: Discharge to home or other facility with appropriate resources  Description: INTERVENTIONS:  - Identify barriers to discharge w/patient and caregiver  - Arrange for needed discharge resources and transportation as appropriate  - Identify discharge learning needs (meds, wound care, etc.)  - Arrange for interpretive services to assist at discharge as needed  - Refer to Case Management Department for coordinating discharge planning if the patient needs post-hospital services based on physician/advanced practitioner order or complex needs related to functional status, cognitive ability, or social support system  Outcome: Progressing     Problem: Knowledge Deficit  Goal: Patient/family/caregiver demonstrates understanding of disease process, treatment plan, medications, and discharge instructions  Description: Complete learning assessment and assess knowledge base.  Interventions:  - Provide teaching at level of understanding  - Provide teaching via preferred learning methods  Outcome: Progressing

## 2025-06-05 NOTE — ASSESSMENT & PLAN NOTE
Blanca Mason is a 54 y.o. female with past psychiatric history of Schizoaffective disorder, bipolar type, Anxiety, Depression, BPD, polysubstance abuse, past suicide attempts, and multiple inpatient [facility] admissions, and past medical history of DVT/PE on warfarin, MAG on CPAP, BL LE edema, Sjogren's syndrome, GERD, restless leg syndrome, asthma, COPD, chronic back and neck pain and recent right ankle orthopedic surgery 5/14 ,who is admitted to [facility] on 06/04/2025 due to self reported confusion for approx 2 months, also noticed by family and friends. Psychiatry was consulted for altered mental status, cognitive impairment and borderline personality disorder.     At this time patient appears to be doing well from a behavioral health perspective.  She is calm cooperative and quite pleasant upon approach.  Patient's only concern is confusion which again has been lasting a couple of months.  I informed patient that my recommendation at this time after reviewing her psychiatric medications is to first taper her down off of the Topamax as confusion and fogginess is a known side effect.  I would aim to decrease this medication by approximately 20 %/day in order to decrease likelihood of provoking a seizure.  Patient can be maintained on her atomoxetine Wellbutrin and Trileptal as well as naltrexone.  She is also on Invega SNOWDEN and followed by the [facility] team.  I would at this time not continue Latuda (patient reports not being on this medication at home), Austedo or Cogentin.  Patient is on an SNOWDEN Invega as maintenance for her thought disorder and given that I do not feel that the risk versus benefits of the Austedo and Cogentin are in favor of these medications given that SNOWDEN's are less likely to result in EPS and tardive dyskinesia and I wish to reduce polypharmacy.  Other than the aforementioned adjustments, patient has no other acute psychiatric needs and does not need psychiatric  hospitalization.    -Taper Topamax by 20 %/day until complete discontinuation  -Continue Strattera 60 mg daily  -Continue Wellbutrin 450 mg XL daily  -Continue naltrexone 50 mg daily  -Continue Trileptal 450 mg every 12 hrs.  -Patient received SNOWDEN of Invega in early May and is due for follow-up injection in the next couple of days, would reach out to ACT team as patient can get this injection as an outpatient unless they would prefer us to give the patient injection here  -Would resume trazodone on a as needed basis starting with 50 mg as needed at bedtime tonight for insomnia and if no worsening of mental status patient can resume home dose of trazodone on discharge  - Would hold Cogentin, Austedo, and latuda and would not resume them on discharge    -Patient does not meet criteria for inpatient psychiatric mission at this time  -No further acute psychiatric needs currently.  -Psychiatry to sign off at this time.  Please reach out as necessary.

## 2025-06-05 NOTE — H&P
H&P - Internal Medicine   Name: Blanca Mason 54 y.o. female I MRN: [id number]  Unit/Bed#: ED 19 I Date of Admission: 6/4/2025   Date of Service: 6/4/2025 I Hospital Day: 0     Assessment & Plan  Confusion  Patient reports confusion for approximately 2 months noticed by herself friends and family.  Her friends described as how she normally acts when she smokes marijuana but patient reports not smoking much marijuana recently.  She also reports deficits in short-term memory and executive functioning.  States she has been having some gait difficulties with possible increase in urinary frequency.  Nonfocal neurologic exam.  Broad differential.  CT head to evaluate for intracranial bleed given altered mental status and supratherapeutic INR as well as to evaluate for normal pressure hydrocephalus.  ABG for hypercapnia.  TSH and T4 test for endocrine abnormalities.  Will also check B12.  UA to assess for UTI.  UDS to assess for intoxication.  Believe the most likely cause of her symptoms is some combination of medication side effects given her extensive medication list and her polypharmacy.  Patient unable to recall what medication she takes as they come in a bubble pack.  Recommend medication reconciliation with pharmacy when pharmacy is open.  Consider psych consult given patient's unclear psychiatric history and multiple psychiatric medications which may be worsening her confusion.  Major depressive disorder  Schizoaffective disorder, bipolar type (HCC)  Patient on extensive psychiatric medications per chart review and unclear psychiatric history.  Patient is unable to give a definitive answer about which of these she is prescribed and is taking.  Recommend med rec with pharmacy during the day.  Polypharmacy may be playing into her confusion.  For now continue atomoxetine, bupropion, oxcarbazepine, topiramate.  Currently holding trazodone, lurasidone, [place], benztropine, naltrexone.  Consider psych consult given  patient's unclear psychiatric history and multiple psychiatric medications which may be worsening her confusion.  History of pulmonary embolism  History of DVT (deep vein thrombosis)  Maintained on warfarin with goal INR 2-3, was briefly on Lovenox injections before and after right foot/ankle orthopedic surgery.  She sees [facility] for anticoagulation management.  Supratherapeutic INR on presentation, will hold warfarin.  Can resume home dosing when INR downtrends/at goal.  Benign essential hypertension  On home losartan 100, clonidine 0.3 twice daily.  Blood pressure here normotensive, can consider holding home losartan.  Continue clonidine to prevent rebound hypertension.  MAG (obstructive sleep apnea)  MAG on CPAP at home reports previous good compliance with intermittent compliance in the last 2 months.  CPAP while here  Sjogren's syndrome ([facility])  Continue pilocarpine for now  Substance abuse ([facility])  Currently smokes marijuana, reports prior meth use with last use 2 years ago per patient.  Will obtain UDS.  Bilateral leg edema  Leg slightly warm but no convincing signs of cellulitis or active infection.  Negative lower extremity Doppler this admission.  Continue home Lasix.  Restless leg syndrome  Continue ropinirole  Hypothyroid  Check TSH, free T4.  Continue home Synthroid 125 mcg  GERD (gastroesophageal reflux disease)  protonix  Asthma  COPD (chronic obstructive pulmonary disease) ([facility])  Unsure if she truly has asthma, outpatient maintained on as needed albuterol which we will continue inpatient.  No PFTs on file.  Not in exacerbation on presentation.  Lactic acidosis  Lactic acidosis of unclear origin with initial 2.9, repeat 3.1.  No clear source of infection, not meeting SIRS criteria.  No clear evidence of endorgan dysfunction given that patient has been having confusion for multiple months.  No signs of limb or mesenteric ischemia.  Per chart review patient was at 1 point on metformin but is  unclear what she is taking now.  Will also check B1 level.   Complete 2 L sepsis bolus ordered by ED and recheck lactic acid.    Code Status: Level 1 - Full Code  Admission Status: INPATIENT   Disposition: Patient requires Med Surg    Admit to team: SOD TEAM B    History of Present Illness     54-year-old female with extensive past medical history most significantly schizoaffective/bipolar, DVT/PE 2023 maintained on warfarin, MAG on CPAP, polysubstance abuse currently using marijuana, bilateral lower extremity edema, Sjogren's syndrome, GERD, restless leg syndrome, asthma/COPD without PFTs on file, chronic back/neck pain and recent right ankle orthopedic surgery 5/14 who was directed to the ED by her PCP because of self-reported confusion.    Patient reports confusion for approximately 2 months noticed by herself friends and family.  Her friends described as how she normally acts when she smokes marijuana but patient reports not smoking much marijuana recently.  She also reports deficits in short-term memory and executive functioning.  States she has been having some gait difficulties with possible increase in urinary frequency.    On arrival to ED, VS T97.9, , RR 20, /81, saturating 97% on room air.  Labs showing supratherapeutic INR 3.17, hypokalemia 2.8, lactic acidosis 2.9,  ABG showing compensated AGMA w/o evidence of CO2 retention, unremarkable CBC, trops WNL.  CTA PE did not demonstrate large PE.  Patient was given Rocephin and fluid bolus.    Patient is a level 1 full code and will be admitted to SOD B for further evaluation of confusion.      Review of Systems   Constitutional:  Negative for chills and fever.   Respiratory:  Negative for cough and shortness of breath.    Cardiovascular:  Positive for leg swelling.   Gastrointestinal:  Negative for abdominal pain, diarrhea, nausea and vomiting.   Genitourinary:  Positive for frequency. Negative for dysuria.   Musculoskeletal:  Positive for back  pain and neck pain.   Neurological:  Positive for speech difficulty. Negative for syncope.   Psychiatric/Behavioral:  The patient is hyperactive.      Medical History Review: I have reviewed the patient's PMH, PSH, Social History, Family History, Meds, and Allergies   Historical Information   Past Medical History[1]  Past Surgical History[2]  Social History[3]  E-Cigarette/Vaping    E-Cigarette Use Current Some Day User     Comments medical THC      E-Cigarette/Vaping Substances    Nicotine No     THC Yes     CBD No     Flavoring No     Other No     Unknown No        Social History[4]    Current Facility-Administered Medications:     [START ON 6/5/2025] atoMOXetine (STRATTERA) capsule 60 mg, Daily    [START ON 6/5/2025] atorvastatin (LIPITOR) tablet 80 mg, Daily    [START ON 6/5/2025] buPROPion (WELLBUTRIN XL) 24 hr tablet 450 mg, Daily    [START ON 6/5/2025] cholecalciferol (VITAMIN D) oral liquid 400 Units, Daily    cloNIDine (CATAPRES) tablet 0.3 mg, BID    [START ON 6/5/2025] cyanocobalamin (VITAMIN B-12) tablet 1,000 mcg, Daily    [START ON 6/5/2025] furosemide (LASIX) tablet 20 mg, Daily    [START ON 6/5/2025] levothyroxine tablet 125 mcg, Early Morning    [START ON 6/5/2025] loratadine (CLARITIN) tablet 5 mg, Daily    [START ON 6/5/2025] losartan (COZAAR) tablet 100 mg, Daily    [COMPLETED] multi-electrolyte (Plasmalyte-A/Isolyte-S PH 7.4/Normosol-R) IV bolus 1,000 mL, Once, Last Rate: 1,000 mL (06/04/25 1912) **FOLLOWED BY** multi-electrolyte (Plasmalyte-A/Isolyte-S PH 7.4/Normosol-R) IV bolus 1,000 mL, Once    [START ON 6/5/2025] naltrexone (REVIA) tablet 50 mg, Daily    [START ON 6/5/2025] naproxen (NAPROSYN) tablet 500 mg, BID With Meals    OXcarbazepine (TRILEPTAL) tablet 450 mg, Q12H TASHA    [START ON 6/5/2025] pantoprazole (PROTONIX) EC tablet 40 mg, Early Morning    pilocarpine (SALAGEN) tablet 5 mg, BID    potassium chloride 20 mEq IVPB (premix), Q2H, Last Rate: 20 mEq (06/04/25 1902)    rOPINIRole  (REQUIP) tablet 1 mg, HS    topiramate (TOPAMAX) tablet 200 mg, BID    [Held by provider] traZODone (DESYREL) tablet 50 mg, HS    [Held by provider] warfarin (COUMADIN) tablet 2 mg, Daily (warfarin)    [Held by provider] warfarin (COUMADIN) tablet 5 mg, Daily (warfarin)  Prior to Admission Medications   Prescriptions Last Dose Informant Patient Reported? Taking?   Aciphex 20 MG tablet  Self Yes No   Sig: Take by mouth as needed   Austedo XR 24 MG TB24  Self Yes No   Cholecalciferol (Vitamin D3) 125 MCG (5000 UT) capsule  Self No No   Sig: Take 1 capsule (5,000 Units total) by mouth daily   Invega Trinza 819 MG/2.63ML DWIGHT   Yes No   OXcarbazepine (TRILEPTAL) 150 mg tablet  Self No No   Sig: Take 3 tablets (450 mg total) by mouth every 12 (twelve) hours   albuterol (PROVENTIL HFA,VENTOLIN HFA) 90 mcg/act inhaler   Yes No   Sig: Inhale 2 puffs every 6 (six) hours as needed for wheezing   atoMOXetine (STRATTERA) 60 mg capsule  Self Yes No   Sig: Take 60 mg by mouth in the morning.   atorvastatin (LIPITOR) 80 mg tablet   No No   Sig: Take 1 tablet (80 mg total) by mouth daily   benzonatate (TESSALON PERLES) 100 mg capsule   No No   Sig: Take 1 capsule (100 mg total) by mouth 3 (three) times a day as needed for cough   Patient not taking: Reported on 5/2/2025   benztropine (COGENTIN) 1 mg tablet  Self Yes No   buPROPion (WELLBUTRIN XL) 150 mg 24 hr tablet  Self Yes No   buPROPion (WELLBUTRIN XL) 300 mg 24 hr tablet   Yes No   cloNIDine (CATAPRES) 0.3 mg tablet   No No   Sig: Take 1 tablet (0.3 mg total) by mouth 2 (two) times a day   cyanocobalamin (VITAMIN B-12) 1000 MCG tablet  Self No No   Sig: Take 1 tablet (1,000 mcg total) by mouth daily   enoxaparin (LOVENOX) 150 mg/mL injection   No No   Sig: Inject 0.9 mL (135 mg total) under the skin every 12 (twelve) hours   furosemide (LASIX) 20 mg tablet   No No   Sig: TAKE 1 TABLET BY MOUTH DAILY   levocetirizine (XYZAL) 5 MG tablet  Self No No   Sig: TAKE ONE TABLET BY  MOUTH IN THE EVENING DAILY   levothyroxine 125 mcg tablet   No No   Sig: TAKE 1 TABLET BY MOUTH DAILY IN THE EARLY MORNING   losartan (COZAAR) 100 MG tablet   No No   Sig: TAKE 1 TABLET BY MOUTH DAILY   lurasidone (LATUDA) 40 mg tablet  Self Yes No   Patient not taking: Reported on 5/2/2025   metFORMIN (GLUCOPHAGE-XR) 750 mg 24 hr tablet   No No   Sig: TAKE 1 TABLET BY MOUTH DAILY WITH BREAKFAST   naltrexone (REVIA) 50 mg tablet  Self No No   Sig: TAKE 1 TABLET BY MOUTH DAILY   naproxen (Naprosyn) 500 mg tablet  Self No No   Sig: Take 1 tablet (500 mg total) by mouth 2 (two) times a day with meals Take with food.   oxybutynin (DITROPAN) 5 mg tablet  Self No No   Sig: Take 1 tablet (5 mg total) by mouth 2 (two) times a day   pantoprazole (PROTONIX) 40 mg tablet  Self No No   Sig: Take 1 tablet (40 mg total) by mouth daily in the early morning   pilocarpine (SALAGEN) 5 mg tablet  Self No No   Sig: TAKE 1 TABLET (5 MG TOTAL) BY MOUTH THREE (THREE) TIMES A DAY   rOPINIRole (REQUIP) 1 mg tablet 6/3/2025 Self Yes Yes   Sig: Take by mouth daily   topiramate (TOPAMAX) 200 MG tablet  Self No No   Sig: Take 1 tablet (200 mg total) by mouth 2 (two) times a day   traMADol (ULTRAM) 50 mg tablet   Yes No   traZODone (DESYREL) 100 mg tablet 6/4/2025 Self Yes Yes   trospium chloride (SANCTURA) 20 mg tablet 6/4/2025 Self Yes Yes   Sig: Take 20 mg by mouth in the morning and 20 mg in the evening.   warfarin (COUMADIN) 2 mg tablet Past Week Self No Yes   Sig: take 3 & 1/2 tablets (7MG) once a day Saturday and Sunday and take 2 & 1/2 tablets (5MG) once a day Monday through Friday   warfarin (COUMADIN) 5 mg tablet 6/4/2025 Self No Yes   Sig: Take 1 tablet (5 mg total) by mouth daily      Facility-Administered Medications: None     Percocet [oxycodone-acetaminophen], Povidone iodine, and Chlorhexidine    Objective :  Temp:  [97.9 °F (36.6 °C)] 97.9 °F (36.6 °C)  HR:  [108-125] 116  BP: (109-123)/(53-81) 109/56  Resp:  [17-20] 19  SpO2:   [93 %-97 %] 96 %  O2 Device: Nasal cannula  Nasal Cannula O2 Flow Rate (L/min):  [2 L/min] 2 L/min    Intake & Output:  I/O       None          Weights:        There is no height or weight on file to calculate BMI.  Weight (last 2 days)       None          Physical Exam  Constitutional:       Appearance: She is obese. She is not ill-appearing.   HENT:      Head: Normocephalic and atraumatic.     Eyes:      Extraocular Movements: Extraocular movements intact.      Right eye: No nystagmus.      Left eye: No nystagmus.      Pupils: Pupils are equal, round, and reactive to light.      Comments: Pupils initially 4 mm and bright room, respond appropriately to bright light. Multiple xanthelesmas.     Cardiovascular:      Rate and Rhythm: Normal rate and regular rhythm.      Heart sounds: Normal heart sounds.   Pulmonary:      Breath sounds: Normal breath sounds. No wheezing.   Abdominal:      General: Abdomen is protuberant.      Palpations: Abdomen is soft.      Tenderness: There is no abdominal tenderness.     Musculoskeletal:      Right lower leg: 3+ Edema present.      Left lower leg: 3+ Edema present.      Comments: Mild bilateral calf tenderness     Skin:     General: Skin is warm and dry.      Findings: Ecchymosis present.      Comments: Bilateral venous stasis changes lower extremities, scattered ecchymoses     Neurological:      General: No focal deficit present.      Mental Status: She is alert.      GCS: GCS eye subscore is 4. GCS verbal subscore is 5. GCS motor subscore is 6.      Cranial Nerves: No facial asymmetry.      Coordination: Finger-Nose-Finger Test normal.      Comments: No focal neurodeficits, alert and oriented to person place and year, does not know president   Psychiatric:         Speech: Speech is rapid and pressured and tangential.         Behavior: Behavior is hyperactive. Behavior is cooperative.         Cognition and Memory: She exhibits impaired recent memory.           Lab Results: I have  reviewed the following results:  Recent Labs     06/04/25  1645 06/04/25  1855   WBC 10.16  --    HGB 13.8  --    HCT 41.7  --      --    SODIUM 139  --    K 2.8*  --      --    CO2 23  --    BUN 3*  --    CREATININE 0.66  --    GLUC 119  --    AST 35  --    ALT 47  --    ALB 3.6  --    TBILI 0.57  --    ALKPHOS 83  --    PTT 55*  --    INR 3.17*  --    HSTNI0 8  --    HSTNI2  --  8   BNP 30  --    LACTICACID 2.9* 3.1*     Micro:  Lab Results   Component Value Date    URINECX 30,000-39,000 cfu/ml Escherichia coli (A) 06/19/2024    URINECX 60,000-69,000 cfu/ml 11/13/2023    URINECX No Growth <1000 cfu/mL 01/13/2021             Currently Ordered Meds:   Current Facility-Administered Medications:     [START ON 6/5/2025] atoMOXetine (STRATTERA) capsule 60 mg, Daily    [START ON 6/5/2025] atorvastatin (LIPITOR) tablet 80 mg, Daily    [START ON 6/5/2025] buPROPion (WELLBUTRIN XL) 24 hr tablet 450 mg, Daily    [START ON 6/5/2025] cholecalciferol (VITAMIN D) oral liquid 400 Units, Daily    cloNIDine (CATAPRES) tablet 0.3 mg, BID    [START ON 6/5/2025] cyanocobalamin (VITAMIN B-12) tablet 1,000 mcg, Daily    [START ON 6/5/2025] furosemide (LASIX) tablet 20 mg, Daily    [START ON 6/5/2025] levothyroxine tablet 125 mcg, Early Morning    [START ON 6/5/2025] loratadine (CLARITIN) tablet 5 mg, Daily    [START ON 6/5/2025] losartan (COZAAR) tablet 100 mg, Daily    [COMPLETED] multi-electrolyte (Plasmalyte-A/Isolyte-S PH 7.4/Normosol-R) IV bolus 1,000 mL, Once, Last Rate: 1,000 mL (06/04/25 1912) **FOLLOWED BY** multi-electrolyte (Plasmalyte-A/Isolyte-S PH 7.4/Normosol-R) IV bolus 1,000 mL, Once    [START ON 6/5/2025] naltrexone (REVIA) tablet 50 mg, Daily    [START ON 6/5/2025] naproxen (NAPROSYN) tablet 500 mg, BID With Meals    OXcarbazepine (TRILEPTAL) tablet 450 mg, Q12H TASHA    [START ON 6/5/2025] pantoprazole (PROTONIX) EC tablet 40 mg, Early Morning    pilocarpine (SALAGEN) tablet 5 mg, BID    potassium chloride  "20 mEq IVPB (premix), Q2H, Last Rate: 20 mEq (06/04/25 1902)    rOPINIRole (REQUIP) tablet 1 mg, HS    topiramate (TOPAMAX) tablet 200 mg, BID    [Held by provider] traZODone (DESYREL) tablet 50 mg, HS    [Held by provider] warfarin (COUMADIN) tablet 2 mg, Daily (warfarin)    [Held by provider] warfarin (COUMADIN) tablet 5 mg, Daily (warfarin)  VTE Pharmacologic Prophylaxis: VTE covered by:  [Held by provider] warfarin, Oral  [Held by provider] warfarin, Oral     VTE Mechanical Prophylaxis: sequential compression device    Administrative Statements     Portions of the record may have been created with voice recognition software.  Occasional wrong word or \"sound a like\" substitutions may have occurred due to the inherent limitations of voice recognition software.  Read the chart carefully and recognize, using context, where substitutions have occurred.  ==  Salomón Pérez DO  [facility]  Internal Medicine Residency PGY-1       [1]   Past Medical History:  Diagnosis Date    Acute deep vein thrombosis of lower leg, left ([facility]) 10/16/2023    Acute heart failure, unspecified heart failure type ([facility]) 05/06/2024    Anxiety     Arthritis     oa cassandra knees    Asthma     good control- no medications    Yan's esophagus     Bipolar affective disorder ([facility])     Cannabis abuse with unspecified 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[facility])     Chronic pain disorder     lumbar    Contusion of elbow 12/21/2021    COPD (chronic obstructive pulmonary disease) ([facility])     CPAP (continuous positive airway pressure) dependence     DDD (degenerative disc disease), lumbar 04/09/2015    Degenerative disc disease at L5-S1 level     Deliberate self-cutting     9/15/22per pt has not done recently-- does see a therapist and psychiatrist regularly    Depression 09/16/2008    Diarrhea 01/06/2022    Disease of thyroid gland     hypo    MARTINEZ (dyspnea on exertion)     per pt \"has had this with exertion and not new\"    Drug overdose " 10/28/2008    suicide attempt and again drug overdose 7/2022-- [facility] floor and than [facility]    Dysphagia     Dyspnea     Ecchymosis 01/06/2022    Edema     BLE    Elevated troponin 09/02/2023    Family history of blood clots     GERD (gastroesophageal reflux disease)     Grief 11/11/2023    Headache(784.0)     Headache, tension-type     Hemorrhage of rectum and anus 10/02/2017    Formatting of this note might be different from the original.  Added automatically from request for surgery [id number]    High blood pressure     History of COVID-19 12/30/2020    Symptoms started on 12/30/2020 and then got worse.  Today she is feeling a little bit better.  She is a candidate for monoclonal antibody infusion and set for 1/6/21 @ 1pm at [facility]. 01/07/21 - telemedicine -     History of kidney stones     Hyperlipidemia     Hypertension     Ingrown toenail 12/02/2023    Intentional overdose (HCC) 09/27/2023    Intentional self-harm by unspecified sharp object, sequela (HCC) 02/09/2023    Irritable bowel syndrome     Knee pain, bilateral     Especially right    Knee pain, right 02/23/2022    Medial epicondylitis of elbow, left 04/03/2018    Formatting of this note might be different from the original.  Added automatically from request for surgery [id number]    Medial epicondylitis of right elbow 07/17/2023    Medical marijuana use     Memory difficulties 08/06/2020    Memory loss     Migraines     Muscle weakness     legs    Obesity     Other psychoactive substance abuse with unspecified psychoactive substance-induced disorder (HCC) 05/06/2024    Overactive bladder     Polysubstance abuse (HCC) 09/29/2023    Potential for cognitive impairment 06/17/2021    Primary osteoarthritis of both knees 08/08/2018    Pulmonary emboli (HCC)     Restrictive lung disease 02/01/2017    Last Assessment & Plan:   Formatting of this note might be different from the original.  I reviewed this problem throughout the rest  of the note. Please refer to the other assessments for details.    Risk for falls     Sjogren's disease (HCC)     Sleep apnea     Stress incontinence     Suicidal deliberate poisoning (HCC) 07/13/2022    Suicidal ideations     Teeth missing     Toxic encephalopathy 11/08/2023    Tremor     per pt Tremors of Right Leg and both Arms    Visual impairment     Wears glasses    [2]   Past Surgical History:  Procedure Laterality Date    BREAST CYST EXCISION Right 1989    CARPAL TUNNEL RELEASE Left     CHOLECYSTECTOMY  05/2003    Laparoscopic    COLONOSCOPY      01/12/2009    DILATION AND CURETTAGE OF UTERUS      ELBOW SURGERY Left     x2. No hardware    ESOPHAGOGASTRODUODENOSCOPY      FOOT SURGERY Left     Plantar fasciotomy    HERNIA REPAIR      HYSTERECTOMY      laporoscopic, partial    KNEE ARTHROSCOPY Bilateral     OOPHORECTOMY Left 10/2015    MT CORRJ HLX VLGS BNCTY SESMDC JOINT ARTHRODESIS Right 8/2/2024    Procedure: RIGHT FOOT LAPIDUS BUNIONECTOMY, 2ND HAMMERTOE REPAIR, SECOND METATARSAL OSTEOTOMY WITH SECOND PLANTAR PLATE REPAIR;  Surgeon: Kaz Bautista DPM;  Location:  MAIN OR;  Service: Podiatry    MT GASTROCNEMIUS RECESSION Left 02/24/2021    Procedure: RECESSION GASTROC OPEN;  Surgeon: Nomi Yang DPM;  Location:  MAIN OR;  Service: Podiatry    MT REMOVAL IMPLANT DEEP Right 5/14/2025    Procedure: FOOT REMOVAL OF PAINFUL HARDWARE, REVISION OF SCAR;  Surgeon: Kaz Bautista DPM;  Location: [facility] MAIN OR;  Service: Podiatry    MT RPR UMBILICAL HRNA 5 YRS/> REDUCIBLE N/A 04/24/2019    Procedure: REPAIR HERNIA UMBILICAL LAPAROSCOPIC W/ ROBOTICS;  Surgeon: Anahi Colon MD;  Location: [facility] Main OR;  Service: General    MT SPHINCTEROTOMY ANAL DIVISION SPHINCTER SPX N/A 08/31/2018    Procedure: EUA, LEFT PARTIAL INTERNAL SPHINCTEROTOMY;  Surgeon: Tien Reyes MD;  Location:  MAIN OR;  Service: Colorectal    MT TNOT ELBOW LATERAL/MEDIAL DEBRIDE OPEN TDN RPR Left 09/20/2022    Procedure:  RELEASE EPICONDYLAR ELBOW MEDIAL;  Surgeon: Kyree Winkler MD;  Location: [facility] MAIN OR;  Service: Orthopedics    REDUCTION MAMMAPLASTY Bilateral 1999    REPAIR LACERATION Left     left hand-2009    REPLACEMENT TOTAL KNEE Right     ROTATOR CUFF REPAIR Left     TONSILECTOMY AND ADNOIDECTOMY      US GUIDED MSK PROCEDURE  2021    VASCULAR SURGERY      VEIN LIGATION Bilateral     WISDOM TOOTH EXTRACTION     [3]   Social History  Tobacco Use    Smoking status: Former     Current packs/day: 0.00     Average packs/day: 2.0 packs/day for 33.0 years (66.0 ttl pk-yrs)     Types: Cigarettes     Start date: 1985     Quit date: 2018     Years since quittin.4    Smokeless tobacco: Never    Tobacco comments:     Started at age 16.   Vaping Use    Vaping status: Some Days    Substances: THC   Substance and Sexual Activity    Alcohol use: Not Currently     Comment: Recovering alcoholic    Drug use: Yes     Types: Marijuana     Comment: Former meth use, medicinal marijuana(medical card)   [4]   Social History  Tobacco Use    Smoking status: Former     Current packs/day: 0.00     Average packs/day: 2.0 packs/day for 33.0 years (66.0 ttl pk-yrs)     Types: Cigarettes     Start date: 1985     Quit date: 2018     Years since quittin.4    Smokeless tobacco: Never    Tobacco comments:     Started at age 16.   Vaping Use    Vaping status: Some Days    Substances: THC   Substance and Sexual Activity    Alcohol use: Not Currently     Comment: Recovering alcoholic    Drug use: Yes     Types: Marijuana     Comment: Former meth use, medicinal marijuana(medical card)

## 2025-06-05 NOTE — ASSESSMENT & PLAN NOTE
On home losartan 100, clonidine 0.3 twice daily.  Blood pressure here normotensive, can consider holding home losartan.    Consider weaning clonidine to improve confusion, monitor for rebound HTN.

## 2025-06-05 NOTE — UTILIZATION REVIEW
Initial Clinical Review    Admission: Date/Time/Statement:   Admission Orders (From admission, onward)       Ordered        06/04/25 2000  INPATIENT ADMISSION  Once                          Orders Placed This Encounter   Procedures    INPATIENT ADMISSION     Standing Status:   Standing     Number of Occurrences:   1     Level of Care:   Med Surg [16]     Estimated length of stay:   More than 2 Midnights     Certification:   I certify that inpatient services are medically necessary for this patient for a duration of greater than two midnights. See H&P and MD Progress Notes for additional information about the patient's course of treatment.     ED Arrival Information       Expected   -    Arrival   6/4/2025 15:55    Acuity   Urgent              Means of arrival   Ambulance    Escorted by   [facility] ([facility])    Service   [facility]    Admission type   Emergency              Arrival complaint   medical problem             Chief Complaint   Patient presents with    Leg Swelling     Patient reports being sent from PCP for bilateral leg swelling, falling asleep all the time, and tachycardia.  Patient has extensive bruising on right arm, patient reports that she injected her lovenox in her arm a few days ago.       Initial Presentation: 54 y.o. female with PMHx including schizoaffective/bipolar, DVT/PE on warfarin, MAG on CPAP, polysubstance abuse currently using marijuana, BL LE edema, Sjogren's syndrome, GERD, restless leg syndrome, asthma, COPD, chronic back and neck pain and recent right ankle orthopedic surgery 5/14 ,who presented on 6/4/25 to ED due to self reported confusion for approx 2 months, also noticed by family and friends. In the ED, VS T97.9, , RR 20, /81, saturating 97% on room air.  Labs showing supratherapeutic INR 3.17, hypokalemia 2.8, lactic acidosis 2.9,  ABG showing compensated AGMA w/o evidence of CO2 retention, unremarkable CBC, trops WNL. CTA PE did not demonstrate  large PE. CT head negative.  Patient was given Rocephin and fluid bolus.     Plan:  Admit Inpatient status Dx Confusion:   med surg, order B12, TSH, free T4, UA and UDS. Order Psychiatry consult. For now continue atomoxetine, bupropion, oxcarbazepine, topiramate. Currently holding trazodone, lurasidone, Austedo, benztropine, naltrexone. Hold warfarin due to supratherapeutic INR, continue to trend INR. Continue home CPAP.     Date: 6/5   Day 2:  Patient reports that she still feels mildly confused, having issues with spelling and using her phone. She feels that she has to think deeply to complete basic tasks. Order MRI brain. OP FU with Psychiatry. onsider weaning clonidine to improve confusion, monitor for rebound HTN. Continue to monitor mental status, VS and labs.     6/5 Per Behavioral Health consult: Patient does not meet criteria for inpatient psychiatric admission at this time. No further acute psychiatric needs currently. Psychiatry signed off.     ED Treatment-Medication Administration from 06/04/2025 1555 to 06/05/2025 1002         Date/Time Order Dose Route Action     06/04/2025 1734 iohexol (OMNIPAQUE) 350 MG/ML injection (MULTI-DOSE) 65 mL 65 mL Intravenous Given     06/04/2025 1855 potassium chloride (Klor-Con M20) CR tablet 40 mEq 40 mEq Oral Given     06/04/2025 1902 potassium chloride 20 mEq IVPB (premix) 20 mEq Intravenous New Bag     06/04/2025 1923 ceftriaxone (ROCEPHIN) 2 g/50 mL in dextrose IVPB 2,000 mg Intravenous New Bag     06/04/2025 1912 multi-electrolyte (Plasmalyte-A/Isolyte-S PH 7.4/Normosol-R) IV bolus 1,000 mL 1,000 mL Intravenous New Bag     06/04/2025 2200 multi-electrolyte (Plasmalyte-A/Isolyte-S PH 7.4/Normosol-R) IV bolus 1,000 mL 1,000 mL Intravenous New Bag     06/05/2025 0846 atorvastatin (LIPITOR) tablet 80 mg 80 mg Oral Given     06/05/2025 0928 buPROPion (WELLBUTRIN XL) 24 hr tablet 450 mg 450 mg Oral Given     06/05/2025 0900 cloNIDine (CATAPRES) tablet 0.3 mg -- Oral Held  Dose     06/05/2025 1800 cloNIDine (CATAPRES) tablet 0.3 mg -- Oral Held Dose     06/06/2025 0900 cloNIDine (CATAPRES) tablet 0.3 mg -- Oral Held Dose     06/06/2025 1800 cloNIDine (CATAPRES) tablet 0.3 mg -- Oral Held Dose     06/07/2025 0900 cloNIDine (CATAPRES) tablet 0.3 mg -- Oral Held Dose     06/07/2025 1800 cloNIDine (CATAPRES) tablet 0.3 mg -- Oral Held Dose     06/08/2025 0900 cloNIDine (CATAPRES) tablet 0.3 mg -- Oral Held Dose     06/08/2025 1800 cloNIDine (CATAPRES) tablet 0.3 mg -- Oral Held Dose     06/09/2025 0900 cloNIDine (CATAPRES) tablet 0.3 mg -- Oral Held Dose     06/09/2025 1800 cloNIDine (CATAPRES) tablet 0.3 mg -- Oral Held Dose     06/10/2025 0900 cloNIDine (CATAPRES) tablet 0.3 mg -- Oral Held Dose     06/10/2025 1800 cloNIDine (CATAPRES) tablet 0.3 mg -- Oral Held Dose     06/05/2025 0846 cyanocobalamin (VITAMIN B-12) tablet 1,000 mcg 1,000 mcg Oral Given     06/05/2025 0900 furosemide (LASIX) tablet 20 mg -- Oral Held Dose     06/06/2025 0900 furosemide (LASIX) tablet 20 mg -- Oral Held Dose     06/07/2025 0900 furosemide (LASIX) tablet 20 mg -- Oral Held Dose     06/08/2025 0900 furosemide (LASIX) tablet 20 mg -- Oral Held Dose     06/05/2025 0846 loratadine (CLARITIN) tablet 5 mg 5 mg Oral Given     06/05/2025 0846 levothyroxine tablet 125 mcg 125 mcg Oral Given     06/05/2025 0900 losartan (COZAAR) tablet 100 mg -- Oral Held Dose     06/06/2025 0900 losartan (COZAAR) tablet 100 mg -- Oral Held Dose     06/07/2025 0900 losartan (COZAAR) tablet 100 mg -- Oral Held Dose     06/08/2025 0900 losartan (COZAAR) tablet 100 mg -- Oral Held Dose     06/05/2025 0928 naproxen (NAPROSYN) tablet 500 mg 500 mg Oral Given     06/05/2025 0900 naltrexone (REVIA) tablet 50 mg -- Oral Held Dose     06/06/2025 0900 naltrexone (REVIA) tablet 50 mg -- Oral Held Dose     06/07/2025 0900 naltrexone (REVIA) tablet 50 mg -- Oral Held Dose     06/08/2025 0900 naltrexone (REVIA) tablet 50 mg -- Oral Held Dose      06/04/2025 2249 OXcarbazepine (TRILEPTAL) tablet 450 mg 450 mg Oral Given     06/04/2025 2249 topiramate (TOPAMAX) tablet 200 mg 200 mg Oral Given     06/04/2025 2249 rOPINIRole (REQUIP) tablet 1 mg 1 mg Oral Given     06/04/2025 2249 pilocarpine (SALAGEN) tablet 5 mg 5 mg Oral Given     06/05/2025 2200 traZODone (DESYREL) tablet 50 mg -- Oral Held Dose     06/06/2025 2200 traZODone (DESYREL) tablet 50 mg -- Oral Held Dose     06/07/2025 2200 traZODone (DESYREL) tablet 50 mg -- Oral Held Dose     06/08/2025 2200 traZODone (DESYREL) tablet 50 mg -- Oral Held Dose     06/05/2025 1800 warfarin (COUMADIN) tablet 5 mg -- Oral Held Dose     06/06/2025 1800 warfarin (COUMADIN) tablet 5 mg -- Oral Held Dose     06/07/2025 1800 warfarin (COUMADIN) tablet 2 mg -- Oral Held Dose     06/08/2025 1800 warfarin (COUMADIN) tablet 2 mg -- Oral Held Dose     06/04/2025 2235 potassium chloride 20 mEq IVPB (premix) 20 mEq Intravenous New Bag     06/05/2025 0046 multi-electrolyte (Plasmalyte-A/Isolyte-S PH 7.4/Normosol-R) IV bolus 1,000 mL 1,000 mL Intravenous New Bag     06/05/2025 0243 multi-electrolyte (Plasmalyte-A/Isolyte-S PH 7.4/Normosol-R) IV bolus 1,000 mL 1,000 mL Intravenous New Bag     06/05/2025 0245 potassium chloride 20 mEq IVPB (premix) 20 mEq Intravenous New Bag     06/05/2025 0511 potassium chloride 20 mEq IVPB (premix) 20 mEq Intravenous New Bag     06/05/2025 0846 potassium chloride 20 mEq IVPB (premix) 20 mEq Intravenous New Bag            Scheduled Medications:  atoMOXetine, 60 mg, Oral, Daily  atorvastatin, 80 mg, Oral, Daily  buPROPion, 450 mg, Oral, Daily  cholecalciferol, 400 Units, Oral, Daily  [Held by provider] cloNIDine, 0.3 mg, Oral, BID  cyanocobalamin, 1,000 mcg, Oral, Daily  [Held by provider] furosemide, 20 mg, Oral, Daily  insulin lispro, 2-12 Units, Subcutaneous, TID AC  levothyroxine, 125 mcg, Oral, Early Morning  loratadine, 5 mg, Oral, Daily  [Held by provider] losartan, 100 mg, Oral, Daily  [Held  by provider] naltrexone, 50 mg, Oral, Daily  naproxen, 500 mg, Oral, BID With Meals  OXcarbazepine, 450 mg, Oral, Q12H TASHA  pantoprazole, 40 mg, Oral, Early Morning  pilocarpine, 5 mg, Oral, BID  potassium chloride, 40 mEq, Oral, Once  potassium chloride, 40 mEq, Oral, Once  potassium chloride, 20 mEq, Intravenous, Q2H  potassium chloride, 20 mEq, Intravenous, Q2H  rOPINIRole, 1 mg, Oral, HS  topiramate, 200 mg, Oral, BID  [Held by provider] traZODone, 50 mg, Oral, HS  [Held by provider] warfarin, 2 mg, Oral, Daily (warfarin)  [Held by provider] warfarin, 5 mg, Oral, Daily (warfarin)      Continuous IV Infusions: none     PRN Meds:  albuterol, 2 puff, Inhalation, Q6H PRN      ED Triage Vitals [06/04/25 1617]   Temperature Pulse Respirations Blood Pressure SpO2 Pain Score   97.9 °F (36.6 °C) (!) 117 20 109/81 94 % 4     Weight (last 2 days)       None            Vital Signs (last 3 days)       Date/Time Temp Pulse Resp BP MAP (mmHg) SpO2 Calculated FIO2 (%) - Nasal Cannula Nasal Cannula O2 Flow Rate (L/min) O2 Device Patient Position - Orthostatic VS Pain    06/05/25 0928 -- -- -- -- -- -- -- -- -- -- 2    06/05/25 0853 97.4 °F (36.3 °C) 106 20 127/57 84 95 % -- -- None (Room air) Lying --    06/05/25 0445 -- 102 18 107/51 74 97 % -- -- -- -- --    06/05/25 0230 -- 96 17 101/58 75 94 % -- -- None (Room air) -- --    06/05/25 0030 -- 104 -- 100/55 73 93 % -- -- -- Lying --    06/05/25 0015 -- 106 19 108/53 76 94 % -- -- None (Room air) -- --    06/04/25 2230 -- 110 18 99/57 73 95 % -- -- None (Room air) Sitting --    06/04/25 2213 -- -- -- 97/54 -- -- -- -- -- -- --    06/04/25 2200 -- 112 18 97/54 69 96 % -- -- None (Room air) Sitting --    06/04/25 2015 -- 116 19 109/56 77 96 % 28 2 L/min Nasal cannula -- --    06/04/25 1945 -- 112 17 123/68 82 95 % 28 2 L/min Nasal cannula Sitting --    06/04/25 1930 -- 110 18 115/56 -- 93 % -- -- None (Room air) -- --    06/04/25 1900 -- 108 19 114/53 77 97 % -- -- None (Room air)  Sitting --    06/04/25 1700 -- 112 20 114/54 77 94 % -- -- -- -- 4    06/04/25 1617 97.9 °F (36.6 °C) 117 20 109/81 -- 94 % -- -- None (Room air) -- 4              Pertinent Labs/Diagnostic Test Results:   Radiology:  CT head wo contrast   Final Interpretation by Jose R Avalos MD (06/04 2213)      No acute intracranial hemorrhage, midline shift, or mass effect.                  Workstation performed: [id number]         VAS lower limb venous duplex study, unilateral/limited   Final Interpretation by Kenyon Jason MD (06/04 2001)      CTA chest pe study   Final Interpretation by Miky Bajwa MD (06/04 1817)      Poor opacification of pulmonary artery limiting evaluation with no large obvious filling defect.                  Computerized Assisted Algorithm (CAA) may have aided analysis of applicable images.      Workstation performed: [id number]         MRI inpatient order    (Results Pending)     Cardiology:  No orders to display     GI:  No orders to display           Results from last 7 days   Lab Units 06/05/25 0518 06/04/25 2111 06/04/25  1645   WBC Thousand/uL 8.00  --  10.16   HEMOGLOBIN g/dL 11.7  --  13.8   I STAT HEMOGLOBIN g/dl  --  13.9  --    HEMATOCRIT % 36.3  --  41.7   HEMATOCRIT, ISTAT %  --  41  --    PLATELETS Thousands/uL 190  --  211   TOTAL NEUT ABS Thousands/µL 4.66  --  6.36         Results from last 7 days   Lab Units 06/05/25 0518 06/04/25 2159 06/04/25 2111 06/04/25  1645   SODIUM mmol/L 140 141  --  139   POTASSIUM mmol/L 3.3* 3.0*  --  2.8*   CHLORIDE mmol/L 109* 105  --  105   CO2 mmol/L 20* 22  --  23   CO2, I-STAT mmol/L  --   --  21  --    ANION GAP mmol/L 11 14*  --  11   BUN mg/dL 3* 4*  --  3*   CREATININE mg/dL 0.43* 0.62  --  0.66   EGFR ml/min/1.73sq m 116 103  --  101   CALCIUM mg/dL 7.2* 8.0*  --  8.6   CALCIUM, IONIZED, ISTAT mmol/L  --   --  1.10*  --    MAGNESIUM mg/dL 1.8*  --   --   --      Results from last 7 days   Lab Units 06/04/25  4407  06/04/25  1645   AST U/L  --  35   ALT U/L  --  47   ALK PHOS U/L  --  83   TOTAL PROTEIN g/dL  --  6.6   ALBUMIN g/dL  --  3.6   TOTAL BILIRUBIN mg/dL  --  0.57   AMMONIA umol/L 41  --      Results from last 7 days   Lab Units 06/04/25  1720   POC GLUCOSE mg/dl 128     Results from last 7 days   Lab Units 06/05/25 0518 06/04/25 2159 06/04/25  1645   GLUCOSE RANDOM mg/dL 112 118 119         Results from last 7 days   Lab Units 06/05/25  0004   HEMOGLOBIN A1C % 6.1*   EAG mg/dl 128     Results from last 7 days   Lab Units 06/04/25  2111   I STAT BASE EXC mmol/L -3*   I STAT O2 SAT % 94*   ISTAT PH ART  7.437   I STAT ART PCO2 mm HG 29.4*   I STAT ART PO2 mm HG 66.0*   I STAT ART HCO3 mmol/L 19.9*         Results from last 7 days   Lab Units 06/04/25 2159 06/04/25 1855 06/04/25 1645   HS TNI 0HR ng/L  --   --  8   HS TNI 2HR ng/L  --  8  --    HSTNI D2 ng/L  --  0  --    HS TNI 4HR ng/L 7  --   --    HSTNI D4 ng/L -1  --   --          Results from last 7 days   Lab Units 06/05/25 0518 06/04/25 1645 05/29/25  1221   PROTIME seconds 35.8* 32.2* 33.0*   INR  3.66* 3.17* 3.28*   PTT seconds 63* 55*  --      Results from last 7 days   Lab Units 06/04/25 2159   TSH 3RD GENERATON uIU/mL 3.307         Results from last 7 days   Lab Units 06/05/25  0247 06/05/25  0004 06/04/25 1855 06/04/25 1645   LACTIC ACID mmol/L 1.3 3.4* 3.1* 2.9*     Results from last 7 days   Lab Units 06/04/25 1645   BNP pg/mL 30     Results from last 7 days   Lab Units 06/04/25 2159   CLARITY UA  Clear   COLOR UA  Yellow   SPEC GRAV UA  >=1.050*   PH UA  5.5   GLUCOSE UA mg/dl Negative   KETONES UA mg/dl Negative   BLOOD UA  Negative   PROTEIN UA mg/dl 30 (1+)*   NITRITE UA  Positive*   BILIRUBIN UA  Negative   UROBILINOGEN UA (BE) mg/dl <2.0   LEUKOCYTES UA  Negative   WBC UA /hpf None Seen   RBC UA /hpf None Seen   BACTERIA UA /hpf None Seen   EPITHELIAL CELLS WET PREP /hpf Moderate*   MUCUS THREADS  Occasional*     Results from last 7  days   Lab Units 06/04/25 2159   AMPH/METH  Negative   BARBITURATE UR  Negative   BENZODIAZEPINE UR  Negative   COCAINE UR  Negative   METHADONE URINE  Negative   OPIATE UR  Negative   PCP UR  Negative   THC UR  Positive*     Results from last 7 days   Lab Units 06/04/25 1923 06/04/25 1922   BLOOD CULTURE  Received in Microbiology Lab. Culture in Progress. Received in Microbiology Lab. Culture in Progress.     Past Medical History[1]  Present on Admission:   Yan's esophagus determined by biopsy   Benign essential hypertension   MAG (obstructive sleep apnea)   Overactive bladder   Major depressive disorder   Sjogren's syndrome ([facility])   Substance abuse ([facility])   Bilateral leg edema   Restless leg syndrome   History of pulmonary embolism   Schizoaffective disorder, bipolar type ([facility])   Asthma   COPD (chronic obstructive pulmonary disease) ([facility])      Admitting Diagnosis: Confusion [R41.0]  Edema [R60.9]  Age/Sex: 54 y.o. female    Network Utilization Review Department  ATTENTION: Please call with any questions or concerns to [phone] and carefully listen to the prompts so that you are directed to the right person. All voicemails are confidential.   For Discharge needs, contact Care Management DC Support Team at [phone] opt. [phone]  Send all requests for admission clinical reviews, approved or denied determinations and any other requests to dedicated fax number below belonging to the campus where the patient is receiving treatment. List of dedicated fax numbers for the Facilities:  FACILITY NAME UR FAX NUMBER   ADMISSION DENIALS (Administrative/Medical Necessity) [phone]   DISCHARGE SUPPORT TEAM (NETWORK) [phone]   PARENT CHILD HEALTH (Maternity/NICU/Pediatrics) [phone]   [facility] [phone]   [facility] [phone]   [facility] [phone]   [facility] [phone]   [facility]  "[facility] [phone]   [facility] [phone]   [facility] [phone]   [facility] [phone]   [facility] [phone]   [facility] [phone]   [facility] [phone]   [facility] [place] [phone]              [1]   Past Medical History:  Diagnosis Date    Acute deep vein thrombosis of lower leg, left ([facility]) 10/16/2023    Acute heart failure, unspecified heart failure type ([facility]) 05/06/2024    Anxiety     Arthritis     oa cassandra knees    Asthma     good control- no medications    Yan's esophagus     Bipolar affective disorder ([facility])     Cannabis abuse with unspecified 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[facility])     Chronic pain disorder     lumbar    Contusion of elbow 12/21/2021    COPD (chronic obstructive pulmonary disease) ([facility])     CPAP (continuous positive airway pressure) dependence     DDD (degenerative disc disease), lumbar 04/09/2015    Degenerative disc disease at L5-S1 level     Deliberate self-cutting     9/15/22per pt has not done recently-- does see a therapist and psychiatrist regularly    Depression 09/16/2008    Diarrhea 01/06/2022    Disease of thyroid gland     hypo    MARTINEZ (dyspnea on exertion)     per pt \"has had this with exertion and not new\"    Drug overdose 10/28/2008    suicide attempt and again drug overdose 7/2022--[facility]-Medical floor and than transferred to hospitals Psych    Dysphagia     Dyspnea     Ecchymosis 01/06/2022    Edema     BLE    Elevated troponin 09/02/2023    Family history of blood clots     GERD (gastroesophageal reflux disease)     Grief 11/11/2023    Headache(784.0)     Headache, tension-type     Hemorrhage of rectum and anus 10/02/2017    Formatting of this note might be different from the original.  Added automatically from request for surgery [id number]    " High blood pressure     History of COVID-19 12/30/2020    Symptoms started on 12/30/2020 and then got worse.  Today she is feeling a little bit better.  She is a candidate for monoclonal antibody infusion and set for 1/6/21 @ 1pm at [facility]. 01/07/21 - telemedicine -     History of kidney stones     Hyperlipidemia     Hypertension     Ingrown toenail 12/02/2023    Intentional overdose (HCC) 09/27/2023    Intentional self-harm by unspecified sharp object, sequela (HCC) 02/09/2023    Irritable bowel syndrome     Knee pain, bilateral     Especially right    Knee pain, right 02/23/2022    Medial epicondylitis of elbow, left 04/03/2018    Formatting of this note might be different from the original.  Added automatically from request for surgery [id number]    Medial epicondylitis of right elbow 07/17/2023    Medical marijuana use     Memory difficulties 08/06/2020    Memory loss     Migraines     Muscle weakness     legs    Obesity     Other psychoactive substance abuse with unspecified psychoactive substance-induced disorder (HCC) 05/06/2024    Overactive bladder     Polysubstance abuse (HCC) 09/29/2023    Potential for cognitive impairment 06/17/2021    Primary osteoarthritis of both knees 08/08/2018    Pulmonary emboli (HCC)     Restrictive lung disease 02/01/2017    Last Assessment & Plan:   Formatting of this note might be different from the original.  I reviewed this problem throughout the rest of the note. Please refer to the other assessments for details.    Risk for falls     Sjogren's disease (HCC)     Sleep apnea     Stress incontinence     Suicidal deliberate poisoning (HCC) 07/13/2022    Suicidal ideations     Teeth missing     Toxic encephalopathy 11/08/2023    Tremor     per pt Tremors of Right Leg and both Arms    Visual impairment     Wears glasses

## 2025-06-05 NOTE — ASSESSMENT & PLAN NOTE
Patient reports confusion for approximately 2 months noticed by herself friends and family.  Her friends described as how she normally acts when she smokes marijuana but patient reports not smoking much marijuana recently.  She also reports deficits in short-term memory and executive functioning.  States she has been having some gait difficulties with possible increase in urinary frequency.  Nonfocal neurologic exam.  Broad differential.  CT head to evaluate for intracranial bleed given altered mental status and supratherapeutic INR as well as to evaluate for normal pressure hydrocephalus.  ABG for hypercapnia.  TSH and T4 test for endocrine abnormalities.  Will also check B12.  UA to assess for UTI.  UDS to assess for intoxication.  Believe the most likely cause of her symptoms is some combination of medication side effects given her extensive medication list and her polypharmacy.  Patient unable to recall what medication she takes as they come in a bubble pack.  Recommend medication reconciliation with pharmacy when pharmacy is open.  Consider psych consult given patient's unclear psychiatric history and multiple psychiatric medications which may be worsening her confusion.

## 2025-06-05 NOTE — ASSESSMENT & PLAN NOTE
Patient on extensive psychiatric medications per chart review and unclear psychiatric history.  Patient is unable to give a definitive answer about which of these she is prescribed and is taking.  Recommend med rec with pharmacy during the day.  Polypharmacy may be playing into her confusion.  For now continue atomoxetine, bupropion, oxcarbazepine, topiramate.  Currently holding trazodone, lurasidone, Austedo, benztropine, naltrexone.  Consider psych consult given patient's unclear psychiatric history and multiple psychiatric medications which may be worsening her confusion.

## 2025-06-06 VITALS
BODY MASS INDEX: 47.09 KG/M2 | RESPIRATION RATE: 19 BRPM | TEMPERATURE: 97.8 F | DIASTOLIC BLOOD PRESSURE: 134 MMHG | HEIGHT: 66 IN | HEART RATE: 122 BPM | OXYGEN SATURATION: 94 % | WEIGHT: 293 LBS | SYSTOLIC BLOOD PRESSURE: 160 MMHG

## 2025-06-06 LAB
ALBUMIN SERPL BCG-MCNC: 3 G/DL (ref 3.5–5)
ALP SERPL-CCNC: 71 U/L (ref 34–104)
ALT SERPL W P-5'-P-CCNC: 29 U/L (ref 7–52)
ANION GAP SERPL CALCULATED.3IONS-SCNC: 10 MMOL/L (ref 4–13)
AST SERPL W P-5'-P-CCNC: 22 U/L (ref 13–39)
BASOPHILS # BLD AUTO: 0.03 THOUSANDS/ÂΜL (ref 0–0.1)
BASOPHILS NFR BLD AUTO: 1 % (ref 0–1)
BILIRUB SERPL-MCNC: 0.41 MG/DL (ref 0.2–1)
BUN SERPL-MCNC: 4 MG/DL (ref 5–25)
CALCIUM ALBUM COR SERPL-MCNC: 8.9 MG/DL (ref 8.3–10.1)
CALCIUM SERPL-MCNC: 8.1 MG/DL (ref 8.4–10.2)
CHLORIDE SERPL-SCNC: 110 MMOL/L (ref 96–108)
CO2 SERPL-SCNC: 23 MMOL/L (ref 21–32)
CREAT SERPL-MCNC: 0.41 MG/DL (ref 0.6–1.3)
EOSINOPHIL # BLD AUTO: 0.24 THOUSAND/ÂΜL (ref 0–0.61)
EOSINOPHIL NFR BLD AUTO: 4 % (ref 0–6)
ERYTHROCYTE [DISTWIDTH] IN BLOOD BY AUTOMATED COUNT: 15.7 % (ref 11.6–15.1)
GFR SERPL CREATININE-BSD FRML MDRD: 118 ML/MIN/1.73SQ M
GLUCOSE SERPL-MCNC: 104 MG/DL (ref 65–140)
GLUCOSE SERPL-MCNC: 104 MG/DL (ref 65–140)
GLUCOSE SERPL-MCNC: 116 MG/DL (ref 65–140)
HCT VFR BLD AUTO: 35.6 % (ref 34.8–46.1)
HGB BLD-MCNC: 11.5 G/DL (ref 11.5–15.4)
IMM GRANULOCYTES # BLD AUTO: 0.03 THOUSAND/UL (ref 0–0.2)
IMM GRANULOCYTES NFR BLD AUTO: 1 % (ref 0–2)
INR PPP: 3.25 (ref 0.85–1.19)
LYMPHOCYTES # BLD AUTO: 2.12 THOUSANDS/ÂΜL (ref 0.6–4.47)
LYMPHOCYTES NFR BLD AUTO: 36 % (ref 14–44)
MAGNESIUM SERPL-MCNC: 1.8 MG/DL (ref 1.9–2.7)
MCH RBC QN AUTO: 28.5 PG (ref 26.8–34.3)
MCHC RBC AUTO-ENTMCNC: 32.3 G/DL (ref 31.4–37.4)
MCV RBC AUTO: 88 FL (ref 82–98)
MONOCYTES # BLD AUTO: 0.78 THOUSAND/ÂΜL (ref 0.17–1.22)
MONOCYTES NFR BLD AUTO: 13 % (ref 4–12)
NEUTROPHILS # BLD AUTO: 2.63 THOUSANDS/ÂΜL (ref 1.85–7.62)
NEUTS SEG NFR BLD AUTO: 45 % (ref 43–75)
NRBC BLD AUTO-RTO: 0 /100 WBCS
PLATELET # BLD AUTO: 180 THOUSANDS/UL (ref 149–390)
PMV BLD AUTO: 10.5 FL (ref 8.9–12.7)
POTASSIUM SERPL-SCNC: 3 MMOL/L (ref 3.5–5.3)
PROT SERPL-MCNC: 5.5 G/DL (ref 6.4–8.4)
PROTHROMBIN TIME: 32.8 SECONDS (ref 12.3–15)
RBC # BLD AUTO: 4.04 MILLION/UL (ref 3.81–5.12)
SODIUM SERPL-SCNC: 143 MMOL/L (ref 135–147)
WBC # BLD AUTO: 5.83 THOUSAND/UL (ref 4.31–10.16)

## 2025-06-06 PROCEDURE — 97166 OT EVAL MOD COMPLEX 45 MIN: CPT

## 2025-06-06 PROCEDURE — 82948 REAGENT STRIP/BLOOD GLUCOSE: CPT

## 2025-06-06 PROCEDURE — 85610 PROTHROMBIN TIME: CPT

## 2025-06-06 PROCEDURE — 85025 COMPLETE CBC W/AUTO DIFF WBC: CPT

## 2025-06-06 PROCEDURE — 80053 COMPREHEN METABOLIC PANEL: CPT

## 2025-06-06 PROCEDURE — 83735 ASSAY OF MAGNESIUM: CPT

## 2025-06-06 PROCEDURE — 97162 PT EVAL MOD COMPLEX 30 MIN: CPT

## 2025-06-06 PROCEDURE — 99238 HOSP IP/OBS DSCHRG MGMT 30/<: CPT | Performed by: INTERNAL MEDICINE

## 2025-06-06 RX ORDER — WARFARIN SODIUM 2 MG/1
TABLET ORAL
Qty: 5 TABLET | Refills: 0 | Status: SHIPPED | OUTPATIENT
Start: 2025-06-07 | End: 2025-06-20

## 2025-06-06 RX ORDER — POTASSIUM CHLORIDE 14.9 MG/ML
20 INJECTION INTRAVENOUS
Status: COMPLETED | OUTPATIENT
Start: 2025-06-06 | End: 2025-06-06

## 2025-06-06 RX ORDER — CEFADROXIL 500 MG/1
500 CAPSULE ORAL EVERY 12 HOURS SCHEDULED
Qty: 12 CAPSULE | Refills: 0 | Status: SHIPPED | OUTPATIENT
Start: 2025-06-06 | End: 2025-06-12

## 2025-06-06 RX ORDER — TOPIRAMATE 50 MG/1
TABLET, FILM COATED ORAL
Qty: 17 TABLET | Refills: 0 | Status: SHIPPED | OUTPATIENT
Start: 2025-06-06 | End: 2025-06-12

## 2025-06-06 RX ORDER — TRAZODONE HYDROCHLORIDE 50 MG/1
50 TABLET ORAL
Qty: 30 TABLET | Refills: 0 | Status: SHIPPED | OUTPATIENT
Start: 2025-06-06

## 2025-06-06 RX ORDER — MAGNESIUM SULFATE HEPTAHYDRATE 40 MG/ML
2 INJECTION, SOLUTION INTRAVENOUS ONCE
Status: COMPLETED | OUTPATIENT
Start: 2025-06-06 | End: 2025-06-06

## 2025-06-06 RX ORDER — POTASSIUM CHLORIDE 1500 MG/1
40 TABLET, EXTENDED RELEASE ORAL ONCE
Status: COMPLETED | OUTPATIENT
Start: 2025-06-06 | End: 2025-06-06

## 2025-06-06 RX ADMIN — NAPROXEN 500 MG: 500 TABLET ORAL at 08:37

## 2025-06-06 RX ADMIN — OXCARBAZEPINE 450 MG: 300 TABLET, FILM COATED ORAL at 08:37

## 2025-06-06 RX ADMIN — TOPIRAMATE 150 MG: 100 TABLET, FILM COATED ORAL at 08:37

## 2025-06-06 RX ADMIN — POTASSIUM CHLORIDE 20 MEQ: 14.9 INJECTION, SOLUTION INTRAVENOUS at 08:36

## 2025-06-06 RX ADMIN — MAGNESIUM SULFATE HEPTAHYDRATE 2 G: 40 INJECTION, SOLUTION INTRAVENOUS at 08:46

## 2025-06-06 RX ADMIN — Medication 400 UNITS: at 08:39

## 2025-06-06 RX ADMIN — ATORVASTATIN CALCIUM 80 MG: 80 TABLET, FILM COATED ORAL at 08:37

## 2025-06-06 RX ADMIN — NALTREXONE HYDROCHLORIDE 50 MG: 50 TABLET, FILM COATED ORAL at 08:38

## 2025-06-06 RX ADMIN — CYANOCOBALAMIN TAB 500 MCG 1000 MCG: 500 TAB at 08:37

## 2025-06-06 RX ADMIN — BUPROPION HYDROCHLORIDE 450 MG: 150 TABLET, EXTENDED RELEASE ORAL at 08:37

## 2025-06-06 RX ADMIN — PANTOPRAZOLE SODIUM 40 MG: 40 TABLET, DELAYED RELEASE ORAL at 05:19

## 2025-06-06 RX ADMIN — LEVOTHYROXINE SODIUM 125 MCG: 0.12 TABLET ORAL at 05:19

## 2025-06-06 RX ADMIN — CEFAZOLIN SODIUM 2000 MG: 2 SOLUTION INTRAVENOUS at 13:08

## 2025-06-06 RX ADMIN — CLONIDINE HYDROCHLORIDE 0.3 MG: 0.1 TABLET ORAL at 08:37

## 2025-06-06 RX ADMIN — LORATADINE 5 MG: 10 TABLET ORAL at 08:37

## 2025-06-06 RX ADMIN — CEFAZOLIN SODIUM 2000 MG: 2 SOLUTION INTRAVENOUS at 05:19

## 2025-06-06 RX ADMIN — POTASSIUM CHLORIDE 20 MEQ: 14.9 INJECTION, SOLUTION INTRAVENOUS at 10:42

## 2025-06-06 RX ADMIN — POTASSIUM CHLORIDE 40 MEQ: 1500 TABLET, EXTENDED RELEASE ORAL at 08:34

## 2025-06-06 RX ADMIN — ATOMOXETINE 60 MG: 60 CAPSULE ORAL at 08:38

## 2025-06-06 RX ADMIN — PILOCARPINE HYDRCHLORIDE 5 MG: 5 TABLET, FILM COATED ORAL at 10:42

## 2025-06-06 NOTE — DISCHARGE SUMMARY
Discharge Summary - Internal Medicine   Name: Blanca Mason 54 y.o. female I MRN: [id number]  Unit/Bed#: -01 I Date of Admission: 6/4/2025   Date of Service: 6/6/2025 I Hospital Day: 2    Assessment & Plan  Confusion  Patient reports confusion for approximately 2 months noticed by herself friends and family.  Her friends described as how she normally acts when she smokes marijuana but patient reports not smoking much marijuana recently.  She also reports deficits in short-term memory and executive functioning.  States she has been having some gait difficulties with possible increase in urinary frequency.  Nonfocal neurologic exam.  Broad differential.  CT head to evaluate for intracranial bleed given altered mental status and supratherapeutic INR as well as to evaluate for normal pressure hydrocephalus.  ABG for hypercapnia.  TSH and T4 test for endocrine abnormalities.  Will also check B12.  UA to assess for UTI.  UDS to assess for intoxication.  Believe the most likely cause of her symptoms is some combination of medication side effects given her extensive medication list and her polypharmacy.  Patient unable to recall what medication she takes as they come in a bubble pack.  Recommend medication reconciliation with pharmacy when pharmacy is open.  Consider psych consult given patient's unclear psychiatric history and multiple psychiatric medications which may be worsening her confusion.  CT head clear, ABG + lactic acidosis, but lactic acid resolved. Thyroid function clear. B12 clear. UA shows + nitrites. UDS + THC.   Septicemia less likely given does not meet SIRS criteria (only tachy to 100).  Per Dr. Quigley w/ psych, topiramate is most likely pharmacologic factor contributing to confusion, will wean per psych recs until off within the upcoming week. Will discontinue latuda, austedo, and cogentin. Needs close follow up with her psychiatrist regarding medication rgeimen  Continue with Invega SNOWDEN per  "psych  Schedule MRI brain in outpatient setting.   Outpt CBC, BMP 6/9  Pt has PCP appt Wed, 6/11, can f/u then  Pt has psychiatry appt near the end of June.  Major depressive disorder  Schizoaffective disorder, bipolar type (HCC)  Patient on extensive psychiatric medications per chart review and unclear psychiatric history.  Patient is unable to give a definitive answer about which of these she is prescribed and is taking.  Recommend med rec with pharmacy during the day.  Polypharmacy may be playing into her confusion.  See \"Confusion\" plan  History of pulmonary embolism  History of DVT (deep vein thrombosis)  Maintained on warfarin with goal INR 2-3, was briefly on Lovenox injections before and after right foot/ankle orthopedic surgery.  She follows with  [facility] for anticoagulation management.  Supratherapeutic INR on presentation and remains supratherapeutic at 3.28 will hold warfarin today.  Hold Warfarin today, start 2mg 6/7.   Outpt INR 6/9, f/u with PCP or heme.  Benign essential hypertension  On home losartan 100, clonidine 0.3 twice daily.  Blood pressure here normotensive, resume home antihypertensives  MAG (obstructive sleep apnea)  MAG on CPAP at home reports previous good compliance with intermittent compliance in the last 2 months.  Didn't have CPAP 6/4-6/5  CPAP while here, pt's father brought 6/5.   Discharge on home CPAP  Sjogren's syndrome (HCC)  Continue pilocarpine for now  Substance abuse (HCC)  Currently smokes marijuana, reports prior meth use with last use 2 years ago per patient.  UDS + for THC.   Bilateral leg edema  Leg slightly warm but no convincing signs of cellulitis or active infection.  Negative lower extremity Doppler this admission.  Continue home Lasix.  Likely venous stasis, will start Ancef 2000mg q8 to address possibility of cellulitis.   Restless leg syndrome  Continue ropinirole  Hypothyroid  Check TSH, free T4.  Continue home Synthroid 125 mcg  TSH 3.3, Free T4 " 1.14  GERD (gastroesophageal reflux disease)  protonix  Asthma  COPD (chronic obstructive pulmonary disease) ([facility])  Unsure if she truly has asthma, outpatient maintained on as needed albuterol which we will continue inpatient.  No PFTs on file.  Not in exacerbation on presentation.    Admission Date: 6/4/2025 1556  Discharge Date: 06/06/25  Admitting Diagnosis: Borderline personality disorder (HCC) [F60.3]  Edema [R60.9]  Hypokalemia [E87.6]  Confusion [R41.0]  Cognitive impairment [R41.89]  Swelling of left lower extremity [M79.89]  Sepsis (HCC) [A41.9]  Altered mental status, unspecified altered mental status type [R41.82]  Discharge Diagnosis:   Medical Problems       Resolved Problems  Date Reviewed: 6/4/2025          Resolved    Lactic acidosis 6/5/2025     Resolved by  Karuna Saenz DO          HPI:   Patient seen and examined at bedside. No acute events overnight. Pt has no complaints at this time. Pt states she feels less fatigued and less confused. She is AAOx3. She express interest in being discharged and states she has PCP appt 6/11 and psychiatry appt at the end of June.     Procedures Performed: No orders of the defined types were placed in this encounter.      Summary of Hospital Course: CT head, TFTs, B12, UA, UDS did not explain presentation. Likely polypharmacy / adverse effect of medication. Meds were adjusted and weaned per rec from Dr. Quigley in Psychiatry, as per A/P. Pt was started on 2000 Ancef q8 due to slight concern of cellulitis of LLE. K administered as needed. Lactic acidosis resolved with fluids. Warfarin held due to supra therapeutic INR, now 3.25 (index 2-3).    Per Dr. Pérez:    54-year-old female with extensive past medical history most significantly schizoaffective/bipolar, DVT/PE 2023 maintained on warfarin, MAG on CPAP, polysubstance abuse currently using marijuana, bilateral lower extremity edema, Sjogren's syndrome, GERD, restless leg syndrome, asthma/COPD without PFTs on  file, chronic back/neck pain and recent right ankle orthopedic surgery 5/14 who was directed to the ED by her PCP because of self-reported confusion.     Patient reports confusion for approximately 2 months noticed by herself friends and family.  Her friends described as how she normally acts when she smokes marijuana but patient reports not smoking much marijuana recently.  She also reports deficits in short-term memory and executive functioning.  States she has been having some gait difficulties with possible increase in urinary frequency.     On arrival to ED, VS T97.9, , RR 20, /81, saturating 97% on room air.  Labs showing supratherapeutic INR 3.17, hypokalemia 2.8, lactic acidosis 2.9,  ABG showing compensated AGMA w/o evidence of CO2 retention, unremarkable CBC, trops WNL.  CTA PE did not demonstrate large PE.  Patient was given Rocephin and fluid bolus.     Patient is a level 1 full code and will be admitted to [facility] for further evaluation of confusion.      Significant Findings, Care, Treatment and Services Provided:  as per Hospital Course.     Complications: n/a    Discharge Day Visit / Exam:   BP (!) 160/134   Pulse (!) 122   Temp 97.8 °F (36.6 °C)   Resp 19   SpO2 94%   Physical Exam  Constitutional:       General: She is not in acute distress.  HENT:      Mouth/Throat:      Mouth: Mucous membranes are moist.     Eyes:      Extraocular Movements: Extraocular movements intact.      Pupils: Pupils are equal, round, and reactive to light.       Cardiovascular:      Rate and Rhythm: Normal rate and regular rhythm.      Pulses: Normal pulses.      Heart sounds: Normal heart sounds. No murmur heard.  Pulmonary:      Effort: Pulmonary effort is normal. No respiratory distress.      Breath sounds: Normal breath sounds.   Abdominal:      Palpations: Abdomen is soft.     Musculoskeletal:      Cervical back: Normal range of motion. No rigidity.      Right lower leg: Edema present.      Left lower  leg: Edema present.     Skin:     General: Skin is warm.      Capillary Refill: Capillary refill takes less than 2 seconds.     Neurological:      General: No focal deficit present.      Mental Status: She is alert and oriented to person, place, and time.     Psychiatric:         Mood and Affect: Mood normal.         Behavior: Behavior normal.          Discussion with Family: Patient declined call to .     Condition at Discharge: good       Discharge instructions/Information to patient and family:   See After Visit Summary (AVS) for information provided to patient and family.      Provisions for Follow-Up Care:  See after visit summary for information related to follow-up care and any pertinent home health orders.      PCP: JHONATAN Armando    Disposition: Home    Planned Readmission: No     Discharge Medications:  See after visit summary for reconciled discharge medications provided to patient and family.      Discharge Statement:  I have spent a total time of 45 minutes in caring for this patient on the day of the visit/encounter. .

## 2025-06-06 NOTE — CASE MANAGEMENT
Case Management Assessment & Discharge Planning Note    Patient name Blanca Mason  Location /-01 MRN [id number]  : 1970 Date 2025       Current Admission Date: 2025  Current Admission Diagnosis:Deep vein thrombosis (DVT) of lower extremity ([facility])   Patient Active Problem List    Diagnosis Date Noted    Pain and swelling of left lower leg 2025    Altered mental status 2025    Deep vein thrombosis (DVT) of lower extremity ([facility]) 2025    PE (pulmonary thromboembolism) ([facility]) 2025    Confusion 2025    Hypothyroid 2025    GERD (gastroesophageal reflux disease) 2025    Post-operative state 2025    Prediabetes 2024    Cervical spondylosis 2024    History of DVT (deep vein thrombosis) 2023    Lymphedema 10/30/2023    Restless leg syndrome 2023    Dyskinesia, drug-induced 2023    History of pulmonary embolism 2023    Cervicalgia 08/10/2023    Bilateral leg edema 2023    Chronic eczematous otitis externa of both ears 2023    Platelets decreased ([facility]) 2022    Anxiety 2022    Borderline personality disorder ([facility]) 2022    Cognitive impairment 10/07/2021    Substance abuse ([facility]) 2021    Mood disorder ([facility]) 2021    Chronic migraine without aura without status migrainosus, not intractable 03/10/2021    BMI 45.0-49.9, adult ([facility]) 2021    Mixed hyperlipidemia 10/30/2019    COPD (chronic obstructive pulmonary disease) ([facility]) 2019    Major depressive disorder 2019    Sjogren's syndrome ([facility]) 2019    MAG (obstructive sleep apnea) 2018    Schizoaffective disorder, bipolar type ([facility]) 2017    Hypothyroidism 2017    Family history of renal cancer 2016    Yan's esophagus determined by biopsy 10/19/2015    Anticoagulation management encounter 2015    Overactive bladder 2015    Asthma 2014    Benign essential  hypertension 07/02/2014    Chronic low back pain 04/07/2012      LOS (days): 2  Geometric Mean LOS (GMLOS) (days): 2.6  Days to GMLOS:1     OBJECTIVE:    Risk of Unplanned Readmission Score: 22.14         Current admission status: Inpatient       Preferred Pharmacy:   [facility] - [place], [place] - [address]  [address]  [address]  [place] [address]  Phone: [phone] Fax: [phone]    Primary Care Provider: JHONATAN Armando    Primary Insurance: [employer]  Secondary Insurance: [facility]    ASSESSMENT:  Active Health Care Proxies    There are no active Health Care Proxies on file.     Readmission Root Cause  30 Day Readmission: No    Patient Information  Admitted from:: Facility  Mental Status: Alert  During Assessment patient was accompanied by: Not accompanied during assessment  Assessment information provided by:: Patient  Primary Caregiver: Self  Support Systems: Parent, Family members, Therapist  County of Residence: [place]  What city do you live in?: [place]  Home entry access options. Select all that apply.: Elevator  Type of Current Residence: Apartment  Floor Level: 4  Upon entering residence, is there a bedroom on the main floor (no further steps)?: Yes  Upon entering residence, is there a bathroom on the main floor (no further steps)?: Yes  Living Arrangements: Lives Alone  Is patient a ?: No    Activities of Daily Living Prior to Admission  Functional Status: Independent  Completes ADLs independently?: Yes  Ambulates independently?: Yes  Does patient use assisted devices?: No  Does patient currently own DME?: Yes  What DME does the patient currently own?: [employer]  Does patient have a history of Outpatient Therapy (PT/OT)?: Yes  Does the patient have a history of Short-Term Rehab?: No  Does patient have a history of HHC?: Yes  Does patient currently have HHC?: No         Patient Information Continued  Income Source:  SSI/SSD  Does patient have prescription coverage?: Yes  Can the patient afford their medications and any related supplies (such as glucometers or test strips)?: N/A  Does patient receive dialysis treatments?: No  Does patient have a history of substance abuse?: Yes  Historical substance use preference: Methamphetamines, Marijuana  Is patient currently in treatment for substance abuse?: N/A - sober (has not used  for 2 years)  Does patient have a history of Mental Health Diagnosis?: Yes (schizoaffective disorder, bipolar, anxiety)  Is patient receiving treatment for mental health?: Yes  Has patient received inpatient treatment related to mental health in the last 2 years?: Yes (302- 2/2024)         Means of Transportation  Means of Transport to Appts:: Drives Self    DISCHARGE DETAILS:       Other Referral/Resources/Interventions Provided:  Referral Comments: Patient admitted with confusion over the last 2 months, psych consulted pt, meds adjusted, they have signed off.  Anticipated home no needs.  CM following

## 2025-06-06 NOTE — OCCUPATIONAL THERAPY NOTE
Occupational Therapy Evaluation     Patient Name: Blanca Mason  Today's Date: 6/6/2025  Problem List  Principal Problem:    Altered mental status  Active Problems:    Asthma    Yan's esophagus determined by biopsy    Benign essential hypertension    Schizoaffective disorder, bipolar type (HCC)    MAG (obstructive sleep apnea)    Overactive bladder    Major depressive disorder    Sjogren's syndrome (HCC)    COPD (chronic obstructive pulmonary disease) (HCC)    Substance abuse (HCC)    Bilateral leg edema    History of pulmonary embolism    Restless leg syndrome    History of DVT (deep vein thrombosis)    Deep vein thrombosis (DVT) of lower extremity (HCC)    PE (pulmonary thromboembolism) (HCC)    Confusion    Hypothyroid    GERD (gastroesophageal reflux disease)    Past Medical History  Past Medical History[1]  Past Surgical History  Past Surgical History[2]          06/06/25 1048   OT Last Visit   OT Visit Date 06/06/25   Note Type   Note type Evaluation   Pain Assessment   Pain Assessment Tool 0-10   Pain Score No Pain   Restrictions/Precautions   Weight Bearing Precautions Per Order Yes   RLE Weight Bearing Per Order WBAT  (per pt report)   Braces or Orthoses CAM Boot  (RLE)   Other Precautions Cognitive;Bed Alarm;Chair Alarm;Multiple lines   Home Living   Type of Home Apartment   Home Layout One level;Elevator   Home Equipment Walker;Cane  (did not use PTA)   Prior Function   Level of [place] Independent with ADLs;Independent with IADLS   Lives With Alone   Receives Help From Family;Friend(s)   IADLs Family/Friend/Other provides transportation;Independent with meal prep   Falls in the last 6 months 0   Vocational Part time employment   Lifestyle   Autonomy PTA, pt reports being independent with ADLs and IADLs (dad has been assisting with laundry + driving recently)   Reciprocal Relationships Supportive dad + friends   Service to Others PTE   Intrinsic Gratification Playing guitar, gem art  "  General   Additional Pertinent History pt with hx of hardware removal and scar revision of R foot 5/14   Additional General Comments pt reports friends call to check in daily + assist with calendar   Subjective   Subjective \"just wanna go home\"   ADL   Where Assessed Chair   Eating Assistance 7  Independent   Grooming Assistance 7  Independent   UB Bathing Assistance 7  Independent   LB Bathing Assistance 5  Supervision/Setup   UB Dressing Assistance 7  Independent   LB Dressing Assistance 5  Supervision/Setup   Toileting Assistance  5  Supervision/Setup   Bed Mobility   Supine to Sit Unable to assess   Sit to Supine Unable to assess   Additional Comments Pt seated OOB in chair upon arrival   Transfers   Sit to Stand 7  Independent   Stand to Sit 7  Independent   Additional Comments transfers w/o AD   Functional Mobility   Functional Mobility 5  Supervision   Additional Comments no AD   Balance   Static Sitting Good   Dynamic Sitting Fair +   Static Standing Fair   Dynamic Standing Fair   Ambulatory Fair   Activity Tolerance   Activity Tolerance Patient tolerated treatment well   Medical Staff Made Aware PT Bernadette, RUPAL Cummings   Nurse Made Aware RN clearance for session   RUE Assessment   RUE Assessment WFL   LUE Assessment   LUE Assessment WFL   Cognition   Arousal/Participation Responsive;Cooperative   Attention Within functional limits   Orientation Level Oriented X4   Following Commands Follows one step commands without difficulty   Comments Pt pleasant and cooperative t/o session. Pt reports she has been have deficits in STM for a while, not just recently. She compensates with use of calendar + note taking. Pt able to recall 2/3 words w/o cues and recalls the third word with category cue (states shoes w/o cues but word was sock). Pt reports having supervision and daily checks from friends and family + hx of schizoaffective disorder. Psych consulted and adjusted medications   Assessment   Assessment Pt is a 54 " y.o. female admitted to \[facility]\"" on 6/4/2025 w/ Altered mental status + LE swelling.  has a past medical history of DVT, Acute heart failure, Anxiety, Arthritis, Asthma, Yan's esophagus, Bipolar affective disorder, Chronic pain disorder, COPD, DDD, Depression, Diarrhea, Disease of thyroid gland, Dysphagia, Dyspnea, Ecchymosis, Edema, GERD, Hemorrhage, COVID-19, Kidney stones, Hyperlipidemia, Hypertension, IBS, Medial epicondylitis of elbow, Memory loss, Migraines, OA, Pulmonary emboli, Restrictive lung disease, Sjogren's disease, Sleep apnea, Stress incontinence, Toxic encephalopathy, Tremor, Visual impairment, Schizoaffective disorder, and Borderline personality disorder. Pt with active OT orders and OOB to chair orders. Pt seen as a co-evaluation with PT due to the patient's co-morbidities, clinically unstable presentation/clinical complexity, and present impairments. As per pt report, pta, resides alone in a 1STA, 0STE. Pt was I w/  ADLS and has PRN A with IADLS, not currently driving.Upon evaluation, pt currently requires independent for transfers and S for mobility. Pt currently requires I eating, I grooming, I UB ADLs, S LB ADLs, and S toileting. Pt with good social support per report. Pt with hx of memory loss issues and discussed compensatory strategies which she is currently implementing. Has daily check ins from friends and family. Seen by psychiatry who suggest deficits are 2* medication - they are managing. From OT standpoint, recommendation would be home with social support + OPOT. No further acute OT needs. D/C OT. Please re-consult if needed. Thank you. Pt was left after session with all current needs met. The patient's raw score on the AM-PAC Daily Activity Inpatient Short Form is 21. A raw score of greater than or equal to 19 suggests the patient may benefit from discharge to home. Please refer to the recommendation of the Occupational Therapist for safe discharge planning.   Goals   Patient Goals to  "go home   Plan   OT Frequency Eval only   Discharge Recommendation   Rehab Resource Intensity Level, OT III (Minimum Resource Intensity)  (OPOT for cognition)   AM-PAC Daily Activity Inpatient   Lower Body Dressing 3   Bathing 3   Toileting 3   Upper Body Dressing 4   Grooming 4   Eating 4   Daily Activity Raw Score 21   Daily Activity Standardized Score (Calc for Raw Score >=11) 44.27   AM-PAC Applied Cognition Inpatient   Following a Speech/Presentation 4   Understanding Ordinary Conversation 4   Taking Medications 4   Remembering Where Things Are Placed or Put Away 4   Remembering List of 4-5 Errands 3   Taking Care of Complicated Tasks 3   Applied Cognition Raw Score 22   Applied Cognition Standardized Score 47.83     Claudette Hurtado, MS, OTR/L               [1]   Past Medical History:  Diagnosis Date    Acute deep vein thrombosis of lower leg, left ([facility]) 10/16/2023    Acute heart failure, unspecified heart failure type ([facility]) 05/06/2024    Anxiety     Arthritis     oa cassandra knees    Asthma     good control- no medications    Yan's esophagus     Bipolar affective disorder ([facility])     Cannabis abuse with unspecified 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[facility])     Chronic pain disorder     lumbar    Contusion of elbow 12/21/2021    COPD (chronic obstructive pulmonary disease) ([facility])     CPAP (continuous positive airway pressure) dependence     DDD (degenerative disc disease), lumbar 04/09/2015    Degenerative disc disease at L5-S1 level     Deliberate self-cutting     9/15/22per pt has not done recently-- does see a therapist and psychiatrist regularly    Depression 09/16/2008    Diarrhea 01/06/2022    Disease of thyroid gland     hypo    MARTINEZ (dyspnea on exertion)     per pt \"has had this with exertion and not new\"    Drug overdose 10/28/2008    suicide attempt and again drug overdose 7/2022-- [facility] floor and than transferred to [facility]    Dysphagia     Dyspnea     Ecchymosis 01/06/2022    Edema     BLE    Elevated " troponin 09/02/2023    Family history of blood clots     GERD (gastroesophageal reflux disease)     Grief 11/11/2023    Headache(784.0)     Headache, tension-type     Hemorrhage of rectum and anus 10/02/2017    Formatting of this note might be different from the original.  Added automatically from request for surgery [id number]    High blood pressure     History of COVID-19 12/30/2020    Symptoms started on 12/30/2020 and then got worse.  Today she is feeling a little bit better.  She is a candidate for monoclonal antibody infusion and set for 1/6/21 @ 1pm at [facility]. 01/07/21 - telemedicine -     History of kidney stones     Hyperlipidemia     Hypertension     Ingrown toenail 12/02/2023    Intentional overdose (HCC) 09/27/2023    Intentional self-harm by unspecified sharp object, sequela (HCC) 02/09/2023    Irritable bowel syndrome     Knee pain, bilateral     Especially right    Knee pain, right 02/23/2022    Medial epicondylitis of elbow, left 04/03/2018    Formatting of this note might be different from the original.  Added automatically from request for surgery [id number]    Medial epicondylitis of right elbow 07/17/2023    Medical marijuana use     Memory difficulties 08/06/2020    Memory loss     Migraines     Muscle weakness     legs    Obesity     Other psychoactive substance abuse with unspecified psychoactive substance-induced disorder (HCC) 05/06/2024    Overactive bladder     Polysubstance abuse (HCC) 09/29/2023    Potential for cognitive impairment 06/17/2021    Primary osteoarthritis of both knees 08/08/2018    Pulmonary emboli (HCC)     Restrictive lung disease 02/01/2017    Last Assessment & Plan:   Formatting of this note might be different from the original.  I reviewed this problem throughout the rest of the note. Please refer to the other assessments for details.    Risk for falls     Sjogren's disease (HCC)     Sleep apnea     Stress incontinence     Suicidal deliberate poisoning (HCC)  07/13/2022    Suicidal ideations     Teeth missing     Toxic encephalopathy 11/08/2023    Tremor     per pt Tremors of Right Leg and both Arms    Visual impairment     Wears glasses    [2]   Past Surgical History:  Procedure Laterality Date    BREAST CYST EXCISION Right 1989    CARPAL TUNNEL RELEASE Left     CHOLECYSTECTOMY  05/2003    Laparoscopic    COLONOSCOPY      01/12/2009    DILATION AND CURETTAGE OF UTERUS      ELBOW SURGERY Left     x2. No hardware    ESOPHAGOGASTRODUODENOSCOPY      FOOT SURGERY Left     Plantar fasciotomy    HERNIA REPAIR      HYSTERECTOMY      laporoscopic, partial    KNEE ARTHROSCOPY Bilateral     OOPHORECTOMY Left 10/2015    WA CORRJ HLX VLGS BNCTY SESMDC JOINT ARTHRODESIS Right 8/2/2024    Procedure: RIGHT FOOT LAPIDUS BUNIONECTOMY, 2ND HAMMERTOE REPAIR, SECOND METATARSAL OSTEOTOMY WITH SECOND PLANTAR PLATE REPAIR;  Surgeon: Kaz Bautista DPM;  Location: [facility];  Service: Podiatry    WA GASTROCNEMIUS RECESSION Left 02/24/2021    Procedure: RECESSION GASTROC OPEN;  Surgeon: Nomi Yang DPM;  Location:  MAIN OR;  Service: Podiatry    WA REMOVAL IMPLANT DEEP Right 5/14/2025    Procedure: FOOT REMOVAL OF PAINFUL HARDWARE, REVISION OF SCAR;  Surgeon: Kaz Bautista DPM;  Location: [facility];  Service: Podiatry    WA RPR UMBILICAL HRNA 5 YRS/> REDUCIBLE N/A 04/24/2019    Procedure: REPAIR HERNIA UMBILICAL LAPAROSCOPIC W/ ROBOTICS;  Surgeon: Anahi Colon MD;  Location: [facility];  Service: General    WA SPHINCTEROTOMY ANAL DIVISION SPHINCTER SPX N/A 08/31/2018    Procedure: EUA, LEFT PARTIAL INTERNAL SPHINCTEROTOMY;  Surgeon: Tien Reyes MD;  Location: [facility];  Service: Colorectal    WA TNOT ELBOW LATERAL/MEDIAL DEBRIDE OPEN TDN RPR Left 09/20/2022    Procedure: RELEASE EPICONDYLAR ELBOW MEDIAL;  Surgeon: Kyree Winkler MD;  [facility];  Service: Orthopedics    REDUCTION MAMMAPLASTY Bilateral 1999    REPAIR LACERATION Left     left  hand-5/18/2009    REPLACEMENT TOTAL KNEE Right     ROTATOR CUFF REPAIR Left     TONSILECTOMY AND ADNOIDECTOMY      US GUIDED MSK PROCEDURE  04/22/2021    VASCULAR SURGERY      VEIN LIGATION Bilateral     WISDOM TOOTH EXTRACTION        Withheld/decline to answer

## 2025-06-06 NOTE — ASSESSMENT & PLAN NOTE
Patient reports confusion for approximately 2 months noticed by herself friends and family.  Her friends described as how she normally acts when she smokes marijuana but patient reports not smoking much marijuana recently.  She also reports deficits in short-term memory and executive functioning.  States she has been having some gait difficulties with possible increase in urinary frequency.  Nonfocal neurologic exam.  Broad differential.  CT head to evaluate for intracranial bleed given altered mental status and supratherapeutic INR as well as to evaluate for normal pressure hydrocephalus.  ABG for hypercapnia.  TSH and T4 test for endocrine abnormalities.  Will also check B12.  UA to assess for UTI.  UDS to assess for intoxication.  Believe the most likely cause of her symptoms is some combination of medication side effects given her extensive medication list and her polypharmacy.  Patient unable to recall what medication she takes as they come in a bubble pack.  Recommend medication reconciliation with pharmacy when pharmacy is open.  Consider psych consult given patient's unclear psychiatric history and multiple psychiatric medications which may be worsening her confusion.  CT head clear, ABG + lactic acidosis, but lactic acid resolved. Thyroid function clear. B12 clear. UA shows + nitrites. UDS + THC.   Septicemia less likely given does not meet SIRS criteria (only tachy to 100).  Per Dr. Quigley w/ psych, topiramate is most likely pharmacologic factor contributing to confusion, will wean per psych recs until off within the upcoming week. Will discontinue latuda, austedo, and cogentin. Needs close follow up with her psychiatrist regarding medication rgeimen  Continue with Invega SNOWDEN per psych  Schedule MRI brain in outpatient setting.   Outpt CBC, BMP 6/9  Pt has PCP appt Wed, 6/11, can f/u then  Pt has psychiatry appt near the end of June.

## 2025-06-06 NOTE — PLAN OF CARE
Problem: PAIN - ADULT  Goal: Verbalizes/displays adequate comfort level or baseline comfort level  Description: Interventions:  - Encourage patient to monitor pain and request assistance  - Assess pain using appropriate pain scale  - Administer analgesics as ordered based on type and severity of pain and evaluate response  - Implement non-pharmacological measures as appropriate and evaluate response  - Consider cultural and social influences on pain and pain management  - Notify physician/advanced practitioner if interventions unsuccessful or patient reports new pain  - Educate patient/family on pain management process including their role and importance of  reporting pain   - Provide non-pharmacologic/complimentary pain relief interventions  Outcome: Progressing     Problem: INFECTION - ADULT  Goal: Absence or prevention of progression during hospitalization  Description: INTERVENTIONS:  - Assess and monitor for signs and symptoms of infection  - Monitor lab/diagnostic results  - Monitor all insertion sites, i.e. indwelling lines, tubes, and drains  - Monitor endotracheal if appropriate and nasal secretions for changes in amount and color  - [place] appropriate cooling/warming therapies per order  - Administer medications as ordered  - Instruct and encourage patient and family to use good hand hygiene technique  - Identify and instruct in appropriate isolation precautions for identified infection/condition  Outcome: Progressing     Problem: SAFETY ADULT  Goal: Patient will remain free of falls  Description: INTERVENTIONS:  - Educate patient/family on patient safety including physical limitations  - Instruct patient to call for assistance with activity   - Consider consulting OT/PT to assist with strengthening/mobility based on AM PAC & JH-HLM score  - Consult OT/PT to assist with strengthening/mobility   - Keep Call bell within reach  - Keep bed low and locked with side rails adjusted as appropriate  - Keep  care items and personal belongings within reach  - Initiate and maintain comfort rounds  - Make Fall Risk Sign visible to staff  - Offer Toileting every 2 Hours, in advance of need  - Initiate/Maintain bed alarm  - Obtain necessary fall risk management equipment:   - Apply yellow socks and bracelet for high fall risk patients  - Consider moving patient to room near nurses station  Outcome: Progressing     Problem: DISCHARGE PLANNING  Goal: Discharge to home or other facility with appropriate resources  Description: INTERVENTIONS:  - Identify barriers to discharge w/patient and caregiver  - Arrange for needed discharge resources and transportation as appropriate  - Identify discharge learning needs (meds, wound care, etc.)  - Arrange for interpretive services to assist at discharge as needed  - Refer to Case Management Department for coordinating discharge planning if the patient needs post-hospital services based on physician/advanced practitioner order or complex needs related to functional status, cognitive ability, or social support system  Outcome: Progressing

## 2025-06-06 NOTE — PLAN OF CARE
Problem: PAIN - ADULT  Goal: Verbalizes/displays adequate comfort level or baseline comfort level  Description: Interventions:  - Encourage patient to monitor pain and request assistance  - Assess pain using appropriate pain scale  - Administer analgesics as ordered based on type and severity of pain and evaluate response  - Implement non-pharmacological measures as appropriate and evaluate response  - Consider cultural and social influences on pain and pain management  - Notify physician/advanced practitioner if interventions unsuccessful or patient reports new pain  - Educate patient/family on pain management process including their role and importance of  reporting pain   - Provide non-pharmacologic/complimentary pain relief interventions  Outcome: Progressing     Problem: INFECTION - ADULT  Goal: Absence or prevention of progression during hospitalization  Description: INTERVENTIONS:  - Assess and monitor for signs and symptoms of infection  - Monitor lab/diagnostic results  - Monitor all insertion sites, i.e. indwelling lines, tubes, and drains  - Monitor endotracheal if appropriate and nasal secretions for changes in amount and color  - [place] appropriate cooling/warming therapies per order  - Administer medications as ordered  - Instruct and encourage patient and family to use good hand hygiene technique  - Identify and instruct in appropriate isolation precautions for identified infection/condition  Outcome: Progressing  Goal: Absence of fever/infection during neutropenic period  Description: INTERVENTIONS:  - Monitor WBC  - Perform strict hand hygiene  - Limit to healthy visitors only  - No plants, dried, fresh or silk flowers with prasad in patient room  Outcome: Progressing     Problem: SAFETY ADULT  Goal: Patient will remain free of falls  Description: INTERVENTIONS:  - Educate patient/family on patient safety including physical limitations  - Instruct patient to call for assistance with activity   -  Consider consulting OT/PT to assist with strengthening/mobility based on AM PAC & JH-HLM score  - Consult OT/PT to assist with strengthening/mobility   - Keep Call bell within reach  - Keep bed low and locked with side rails adjusted as appropriate  - Keep care items and personal belongings within reach  - Initiate and maintain comfort rounds  - Make Fall Risk Sign visible to staff  - Offer Toileting every 2 Hours, in advance of need  - Initiate/Maintain bed alarm  - Obtain necessary fall risk management equipment:   - Apply yellow socks and bracelet for high fall risk patients  - Consider moving patient to room near nurses station  Outcome: Progressing  Goal: Maintain or return to baseline ADL function  Description: INTERVENTIONS:  -  Assess patient's ability to carry out ADLs; assess patient's baseline for ADL function and identify physical deficits which impact ability to perform ADLs (bathing, care of mouth/teeth, toileting, grooming, dressing, etc.)  - Assess/evaluate cause of self-care deficits   - Assess range of motion  - Assess patient's mobility; develop plan if impaired  - Assess patient's need for assistive devices and provide as appropriate  - Encourage maximum independence but intervene and supervise when necessary  - Involve family in performance of ADLs  - Assess for home care needs following discharge   - Consider OT consult to assist with ADL evaluation and planning for discharge  - Provide patient education as appropriate  - Monitor functional capacity and physical performance, use of AM PAC & JH-HLM   - Monitor gait, balance and fatigue with ambulation    Outcome: Progressing  Goal: Maintains/Returns to pre admission functional level  Description: INTERVENTIONS:  - Perform AM-PAC 6 Click Basic Mobility/ Daily Activity assessment daily.  - Set and communicate daily mobility goal to care team and patient/family/caregiver.   - Collaborate with rehabilitation services on mobility goals if  consulted  - Perform Range of Motion 3 times a day.  - Reposition patient every 2 hours.  - Dangle patient 3 times a day  - Stand patient 3 times a day  - Ambulate patient 3 times a day  - Out of bed to chair 3 times a day   - Out of bed for meals 3 times a day  - Out of bed for toileting  - Record patient progress and toleration of activity level   Outcome: Progressing     Problem: DISCHARGE PLANNING  Goal: Discharge to home or other facility with appropriate resources  Description: INTERVENTIONS:  - Identify barriers to discharge w/patient and caregiver  - Arrange for needed discharge resources and transportation as appropriate  - Identify discharge learning needs (meds, wound care, etc.)  - Arrange for interpretive services to assist at discharge as needed  - Refer to Case Management Department for coordinating discharge planning if the patient needs post-hospital services based on physician/advanced practitioner order or complex needs related to functional status, cognitive ability, or social support system  Outcome: Progressing     Problem: Knowledge Deficit  Goal: Patient/family/caregiver demonstrates understanding of disease process, treatment plan, medications, and discharge instructions  Description: Complete learning assessment and assess knowledge base.  Interventions:  - Provide teaching at level of understanding  - Provide teaching via preferred learning methods  Outcome: Progressing

## 2025-06-06 NOTE — CERTIFIED RECOVERY SPECIALIST
Certified  Note      Name: Blanca Mason 54 y.o. female I MRN: [id number]  Unit/Bed#: -01 I Date of Admission: 6/4/2025   Date of Service: 6/6/2025 I Hospital Day: 2    Summary of Contact  CRS engaged with patient to check in and offer support if desired.  CRS made introduction and  explained CRS services provided at hospital(Support, encouragement and resources if desired)     Patient reports he is doing well and nothing needed at this time, patient has been in recovery 2 years.    CRS and patient discussed  the disease of Alcohol/Substance Use Disorder, and its progression as well as the physical and medical results of long term use.        Contact information given   Follow up: NA      '      Administrative Statements  I have spent a total time of 15 minutes in caring for this patient on the day of the visit/encounter.    Shira Finley

## 2025-06-06 NOTE — MALNUTRITION/BMI
This medical record reflects one or more clinical indicators suggestive of morbid obesity.    Malnutrition Findings:                               BMI Findings:  Adult BMI Classifications: Morbid Obesity 45-49.9        There is no height or weight on file to calculate BMI.     See Nutrition note dated 6/6/2025  for additional details.  Completed nutrition assessment is viewable in the nutrition documentation.

## 2025-06-06 NOTE — ASSESSMENT & PLAN NOTE
On home losartan 100, clonidine 0.3 twice daily.  Blood pressure here normotensive, resume home antihypertensives

## 2025-06-06 NOTE — NURSING NOTE
AVS is printed and education for this discharge is done with patient. All questions and concerns are answered. Belongings are returned back. Pt is ready to go home.

## 2025-06-06 NOTE — DISCHARGE INSTR - AVS FIRST PAGE
Please take Topamax taper as prescribed  Please take warfarin 2 mg daily until you follow-up with your hematologist after your next INR check  Please follow-up with your primary care doctor on Wednesday  Please get bloodwork prior to your follow up with your Primary care physician and hematologist  Please follow-up with psychiatry as soon as possible  Please schedule MRI brain

## 2025-06-06 NOTE — PHYSICAL THERAPY NOTE
Physical Therapy Evaluation     Patient's Name: Blanca Mason    Admitting Diagnosis  Borderline personality disorder (HCC) [F60.3]  Edema [R60.9]  Hypokalemia [E87.6]  Confusion [R41.0]  Cognitive impairment [R41.89]  Swelling of left lower extremity [M79.89]  Sepsis (HCC) [A41.9]  Altered mental status, unspecified altered mental status type [R41.82]    Problem List  Problem List[1]    Past Medical History  Past Medical History[2]    Past Surgical History  Past Surgical History[3]     06/06/25 1049   PT Last Visit   PT Visit Date 06/06/25   Note Type   Note type Evaluation   Pain Assessment   Pain Assessment Tool 0-10   Pain Score No Pain   Restrictions/Precautions   Weight Bearing Precautions Per Order Yes   RLE Weight Bearing Per Order WBAT  (Per pt report, states she has otho follow up on monday)   Braces or Orthoses CAM Boot  (RLE)   Other Precautions Cognitive;Chair Alarm;Bed Alarm;WBS;Multiple lines;Telemetry   Home Living   Type of Home Apartment   Home Layout One level;Elevator   Home Equipment Walker;Cane   Prior Function   Level of [place] Independent with ADLs;Independent with functional mobility;Independent with IADLS   Lives With Alone   Receives Help From Family;Friend(s)   IADLs Independent with meal prep;Family/Friend/Other provides transportation   Falls in the last 6 months 0   Vocational Part time employment   Comments no AD use PTA   Cognition   Orientation Level Oriented X4   Following Commands Follows one step commands without difficulty   Comments Pt reports recent history of STM deficits with intermittent compliance of strategies such as calendar use and note writting. Per chart, history of schizoaffective disorder and psych consult.   Subjective   Subjective Pt willing and agreeable to PT evaluation.   RLE Assessment   RLE Assessment WFL   LLE Assessment   LLE Assessment WFL   Coordination   Movements are Fluid and Coordinated 1   Bed Mobility   Supine to Sit Unable to assess   Sit  to Supine Unable to assess   Additional Comments Pt OOB in reclining chair upon entry and upon exit with chair alarm and call bell.   Transfers   Sit to Stand 7  Independent   Stand to Sit 7  Independent   Additional Comments no AD throughout.   Ambulation/Elevation   Gait pattern Improper Weight shift;Decreased foot clearance;Wide LESLIE;Excessively slow;Decreased hip extension;Decreased heel strike;Decreased toe off   Gait Assistance 7  Independent   Assistive Device None   Distance 140'   Ambulation/Elevation Additional Comments gait deviations likely secondary to RLE camboot, no AD or LOB throughout.   Balance   Static Sitting Good   Dynamic Sitting Fair +   Static Standing Fair   Dynamic Standing Fair   Ambulatory Fair   Endurance Deficit   Endurance Deficit No   Activity Tolerance   Activity Tolerance Patient tolerated treatment well   Medical Staff Made Aware OT Claudette   Nurse Made Aware yes, nsg gave clearance to work with patient.   Assessment   Prognosis Good   Problem List Decreased mobility;Impaired balance;Orthopedic restrictions   Assessment Pt is a 54 y.o. female admitted to [facility] on 6/4/2025 due to Altered mental status . PT consulted for functional mobility status assessment and discharge recommendations. PTA, pt was independent with ADL's, received assistance for IADLs prn, - , and is currently employed, no AD use for ambulation. Pt presented with a CAM boot on her RLE, reports WBAT status due to recent surgical revision with ortho follow-up on Monday. Pt currently independent with transfers and required supervision for gait due to gait deviations and RLE orthopedic restrictions, gait deviations likely due to RLE CAM boot, no LOB noted. Following session pt positioned to comfort in bedside recliner with chair alarm and call bell. Pt is currently functioning near her baseline with no further needs for skilled PT identified. Pt able to ambulate with staff to maintain functional  status. From PT/Mobility standpoint, recommending home with no further needs when medically cleared. The patient's AM-PAC Basic Mobility Inpatient Short Form Raw Score is 23. A Raw score of greater than or equal to 16 suggests the patient may benefit from discharge to home. Please also refer to the recommendation of the Physical Therapist for safe discharge planning.   Barriers to Discharge None   Goals   Patient Goals To go home   Discharge Recommendation   Rehab Resource Intensity Level, PT No post-acute rehabilitation needs   AM-PAC Basic Mobility Inpatient   Turning in Flat Bed Without Bedrails 4   Lying on Back to Sitting on Edge of Flat Bed Without Bedrails 4   Moving Bed to Chair 4   Standing Up From Chair Using Arms 4   Walk in Room 4   Climb 3-5 Stairs With Railing 3   Basic Mobility Inpatient Raw Score 23   Basic Mobility Standardized Score 50.88   [facility] Highest Level Of Mobility   -HLM Goal 7: Walk 25 feet or more   -HLM Achieved 7: Walk 25 feet or more           RUPAL Hyatt         [1]   Patient Active Problem List  Diagnosis    Asthma    Yan's esophagus determined by biopsy    Benign essential hypertension    Schizoaffective disorder, bipolar type ([facility])    Chronic low back pain    Family history of renal cancer    Hypothyroidism    MAG (obstructive sleep apnea)    Overactive bladder    Major depressive disorder    Sjogren's syndrome (HCC)    COPD (chronic obstructive pulmonary disease) ([facility])    Mixed hyperlipidemia    BMI 45.0-49.9, adult ([facility])    Anticoagulation management encounter    Mood disorder (HCC)    Substance abuse ([facility])    Cognitive impairment    Anxiety    Borderline personality disorder ([facility])    Platelets decreased (HCC)    Chronic eczematous otitis externa of both ears    Chronic migraine without aura without status migrainosus, not intractable    Bilateral leg edema    Cervicalgia    History of pulmonary embolism    Restless leg syndrome    Lymphedema    History of DVT  "(deep vein thrombosis)    Dyskinesia, drug-induced    Cervical spondylosis    Prediabetes    Post-operative state    Altered mental status    Deep vein thrombosis (DVT) of lower extremity (HCC)    PE (pulmonary thromboembolism) ([facility])    Confusion    Hypothyroid    GERD (gastroesophageal reflux disease)    Pain and swelling of left lower leg   [2]   Past Medical History:  Diagnosis Date    Acute deep vein thrombosis of lower leg, left ([facility]) 10/16/2023    Acute heart failure, unspecified heart failure type ([facility]) 05/06/2024    Anxiety     Arthritis     oa cassandra knees    Asthma     good control- no medications    Yan's esophagus     Bipolar affective disorder ([facility])     Cannabis abuse with unspecified 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[facility])     Chronic pain disorder     lumbar    Contusion of elbow 12/21/2021    COPD (chronic obstructive pulmonary disease) ([facility])     CPAP (continuous positive airway pressure) dependence     DDD (degenerative disc disease), lumbar 04/09/2015    Degenerative disc disease at L5-S1 level     Deliberate self-cutting     9/15/22per pt has not done recently-- does see a therapist and psychiatrist regularly    Depression 09/16/2008    Diarrhea 01/06/2022    Disease of thyroid gland     hypo    MARTINEZ (dyspnea on exertion)     per pt \"has had this with exertion and not new\"    Drug overdose 10/28/2008    suicide attempt and again drug overdose 7/2022-- [facility] floor and than transferred to [facility]    Dysphagia     Dyspnea     Ecchymosis 01/06/2022    Edema     BLE    Elevated troponin 09/02/2023    Family history of blood clots     GERD (gastroesophageal reflux disease)     Grief 11/11/2023    Headache(784.0)     Headache, tension-type     Hemorrhage of rectum and anus 10/02/2017    Formatting of this note might be different from the original.  Added automatically from request for surgery [id number]    High blood pressure     History of COVID-19 12/30/2020    Symptoms started on 12/30/2020 and then got " worse.  Today she is feeling a little bit better.  She is a candidate for monoclonal antibody infusion and set for 1/6/21 @ 1pm at [facility]. 01/07/21 - telemedicine -     History of kidney stones     Hyperlipidemia     Hypertension     Ingrown toenail 12/02/2023    Intentional overdose (HCC) 09/27/2023    Intentional self-harm by unspecified sharp object, sequela (HCC) 02/09/2023    Irritable bowel syndrome     Knee pain, bilateral     Especially right    Knee pain, right 02/23/2022    Medial epicondylitis of elbow, left 04/03/2018    Formatting of this note might be different from the original.  Added automatically from request for surgery [id number]    Medial epicondylitis of right elbow 07/17/2023    Medical marijuana use     Memory difficulties 08/06/2020    Memory loss     Migraines     Muscle weakness     legs    Obesity     Other psychoactive substance abuse with unspecified psychoactive substance-induced disorder (HCC) 05/06/2024    Overactive bladder     Polysubstance abuse (HCC) 09/29/2023    Potential for cognitive impairment 06/17/2021    Primary osteoarthritis of both knees 08/08/2018    Pulmonary emboli (HCC)     Restrictive lung disease 02/01/2017    Last Assessment & Plan:   Formatting of this note might be different from the original.  I reviewed this problem throughout the rest of the note. Please refer to the other assessments for details.    Risk for falls     Sjogren's disease (HCC)     Sleep apnea     Stress incontinence     Suicidal deliberate poisoning (HCC) 07/13/2022    Suicidal ideations     Teeth missing     Toxic encephalopathy 11/08/2023    Tremor     per pt Tremors of Right Leg and both Arms    Visual impairment     Wears glasses    [3]   Past Surgical History:  Procedure Laterality Date    BREAST CYST EXCISION Right 1989    CARPAL TUNNEL RELEASE Left     CHOLECYSTECTOMY  05/2003    Laparoscopic    COLONOSCOPY      01/12/2009    DILATION AND CURETTAGE OF UTERUS      ELBOW SURGERY  Left     x2. No hardware    ESOPHAGOGASTRODUODENOSCOPY      FOOT SURGERY Left     Plantar fasciotomy    HERNIA REPAIR      HYSTERECTOMY      laporoscopic, partial    KNEE ARTHROSCOPY Bilateral     OOPHORECTOMY Left 10/2015    AR CORRJ HLX VLGS BNCTY SESMDC JOINT ARTHRODESIS Right 8/2/2024    Procedure: RIGHT FOOT LAPIDUS BUNIONECTOMY, 2ND HAMMERTOE REPAIR, SECOND METATARSAL OSTEOTOMY WITH SECOND PLANTAR PLATE REPAIR;  Surgeon: Kaz Bautista DPM;  Location: [facility];  Service: Podiatry    AR GASTROCNEMIUS RECESSION Left 02/24/2021    Procedure: RECESSION GASTROC OPEN;  Surgeon: Nomi Yang DPM;  Location:  MAIN OR;  Service: Podiatry    AR REMOVAL IMPLANT DEEP Right 5/14/2025    Procedure: FOOT REMOVAL OF PAINFUL HARDWARE, REVISION OF SCAR;  Surgeon: Kaz Bautista DPM;  Location: [facility];  Service: Podiatry    AR RPR UMBILICAL HRNA 5 YRS/> REDUCIBLE N/A 04/24/2019    Procedure: REPAIR HERNIA UMBILICAL LAPAROSCOPIC W/ ROBOTICS;  Surgeon: Anahi Colon MD;  Location: [facility];  Service: General    AR SPHINCTEROTOMY ANAL DIVISION SPHINCTER SPX N/A 08/31/2018    Procedure: EUA, LEFT PARTIAL INTERNAL SPHINCTEROTOMY;  Surgeon: Tien Reyes MD;  Location: [facility];  Service: Colorectal    AR TNOT ELBOW LATERAL/MEDIAL DEBRIDE OPEN TDN RPR Left 09/20/2022    Procedure: RELEASE EPICONDYLAR ELBOW MEDIAL;  Surgeon: Kyree Winkler MD;  Location: [facility] OR;  Service: Orthopedics    REDUCTION MAMMAPLASTY Bilateral 1999    REPAIR LACERATION Left     left hand-5/18/2009    REPLACEMENT TOTAL KNEE Right     ROTATOR CUFF REPAIR Left     TONSILECTOMY AND ADNOIDECTOMY      US GUIDED MSK PROCEDURE  04/22/2021    VASCULAR SURGERY      VEIN LIGATION Bilateral     WISDOM TOOTH EXTRACTION

## 2025-06-06 NOTE — HOSPITAL COURSE
CT head, TFTs, B12, UA, UDS did not explain presentation. Likely polypharmacy / adverse effect of medication. Meds were adjusted and weaned per rec from Dr. Quigley in Psychiatry, as per A/P. Pt was started on 2000 Ancef q8 due to slight concern of cellulitis of LLE. K administered as needed. Lactic acidosis resolved with fluids. Warfarin held due to supra therapeutic INR, now 3.25 (index 2-3).

## 2025-06-06 NOTE — ASSESSMENT & PLAN NOTE
Maintained on warfarin with goal INR 2-3, was briefly on Lovenox injections before and after right foot/ankle orthopedic surgery.  She follows with [facility] heme/onc for anticoagulation management.  Supratherapeutic INR on presentation and remains supratherapeutic at 3.28 will hold warfarin today.  Hold Warfarin today, start 2mg 6/7.   Outpt INR 6/9, f/u with PCP or heme.

## 2025-06-06 NOTE — ASSESSMENT & PLAN NOTE
MAG on CPAP at home reports previous good compliance with intermittent compliance in the last 2 months.  Didn't have CPAP 6/4-6/5  CPAP while here, pt's father brought 6/5.   Discharge on home CPAP

## 2025-06-06 NOTE — UTILIZATION REVIEW
Continued Stay Review    SEE INITIAL REVIEW AT BOTTOM      Date: 6/6/25            Current Patient Class: Inpatient  Current Level of Care: Med Surg    HPI:54 y.o. female initially admitted on 6/4/25.    Current Diagnosis: Confusion    6/6  Day 3: Has surpassed a 2nd midnight with active treatments and services. Discharge Summary: CT head, TFTs, B12, UA, UDS did not explain presentation. Likely polypharmacy / adverse effect of medication. Meds were adjusted and weaned per rec from  Psychiatry. Pt was started on 2,000 Ancef q8 due to slight concern of cellulitis of LLE. K+ administered as needed. Lactic acidosis resolved with fluids. Warfarin held due to supra therapeutic INR, now 3.25 (index 2-3).     6/6 1523 Discharged to home/self care.       Medications:   Scheduled Medications:    atoMOXetine, 60 mg, Oral, Daily  atorvastatin, 80 mg, Oral, Daily  buPROPion, 450 mg, Oral, Daily  cefazolin, 2,000 mg, Intravenous, Q8H x 4 doses given  cholecalciferol, 400 Units, Oral, Daily  cloNIDine, 0.3 mg, Oral, Daily  cyanocobalamin, 1,000 mcg, Oral, Daily  [Held by provider] furosemide, 20 mg, Oral, Daily  insulin lispro, 2-12 Units, Subcutaneous, TID AC  levothyroxine, 125 mcg, Oral, Early Morning  loratadine, 5 mg, Oral, Daily  [Held by provider] losartan, 100 mg, Oral, Daily  magnesium sulfate, 2 g, Intravenous, Once  naltrexone, 50 mg, Oral, Daily  naproxen, 500 mg, Oral, BID With Meals  OXcarbazepine, 450 mg, Oral, Q12H TASHA  pantoprazole, 40 mg, Oral, Early Morning  pilocarpine, 5 mg, Oral, BID  potassium chloride, 40 mEq, Oral, Once  potassium chloride, 40 mEq, Oral, Once  potassium chloride, 20 mEq, Intravenous, Q2H  rOPINIRole, 1 mg, Oral, HS  topiramate, 150 mg, Oral, BID  [Held by provider] traZODone, 50 mg, Oral, HS  [Held by provider] warfarin, 2 mg, Oral, Daily (warfarin)  [Held by provider] warfarin, 5 mg, Oral, Daily (warfarin)    potassium chloride 20 mEq IVPB (premix) x 2 doses 6/6  Dose: 20 mEq  Freq: Every  2 hours Route: IV  Last Dose: Stopped (06/06/25 1314)  Start: 06/06/25 0800 End: 06/06/25 1314      potassium chloride (Klor-Con M20) CR tablet 40 mEq  Dose: 40 mEq  Freq: Once Route: PO  Start: 06/06/25 0800 End: 06/06/25 0834    magnesium sulfate 2 g/50 mL IVPB (premix) 2 g  Dose: 2 g  Freq: Once Route: IV  Last Dose: Stopped (06/06/25 1105)  Start: 06/06/25 0800 End: 06/06/25 1105    Continuous IV Infusions: None.      PRN Meds:    albuterol, 2 puff, Inhalation, Q6H PRN          Vital Signs (last 3 days) before discharge      Date/Time Temp Pulse Resp BP MAP (mmHg) SpO2 Calculated FIO2 (%) - Nasal Cannula Nasal Cannula O2 Flow Rate (L/min) O2 Device O2 Interface Device Patient Position - Orthostatic VS Pain   06/06/25 1100 -- -- -- -- -- -- -- -- None (Room air) -- -- --   06/06/25 1049 -- -- -- -- -- -- -- -- -- -- -- No Pain   06/06/25 1048 -- -- -- -- -- -- -- -- -- -- -- No Pain   06/06/25 0837 -- -- -- -- -- -- -- -- -- -- -- 3   06/06/25 07:39:46 97.8 °F (36.6 °C) 122 19 160/134 143 94 % -- -- -- -- -- --   06/05/25 2300 -- -- -- -- -- -- -- -- None (Room air) -- -- No Pain   06/05/25 22:16:11 97.9 °F (36.6 °C) 98 -- 120/70 87 95 % -- -- -- -- -- --   06/05/25 22:15:12 97.9 °F (36.6 °C) 98 -- 120/70 87 95 % -- -- -- -- -- --   06/05/25 2151 -- -- -- -- -- -- -- -- -- Face mask -- --   06/05/25 1643 -- -- -- -- -- -- -- -- -- -- -- No Pain   06/05/25 12:13:35 98.1 °F (36.7 °C) 104 16 125/74 91 94 % -- -- -- -- -- No Pain   06/05/25 0928 -- -- -- -- -- -- -- -- -- -- -- 2   06/05/25 0853 97.4 °F (36.3 °C) 106 20 127/57 84 95 % -- -- None (Room air) -- Lying --   06/05/25 0445 -- 102 18 107/51 74 97 % -- -- -- -- -- --   06/05/25 0230 -- 96 17 101/58 75 94 % -- -- None (Room air) -- -- --   06/05/25 0030 -- 104 -- 100/55 73 93 % -- -- -- -- Lying --   06/05/25 0015 -- 106 19 108/53 76 94 % -- -- None (Room air) -- -- --   06/04/25 2230 -- 110 18 99/57 73 95 % -- -- None (Room air) -- Sitting --   06/04/25 2213  -- -- -- 97/54 -- -- -- -- -- -- -- --   06/04/25 2200 -- 112 18 97/54 69 96 % -- -- None (Room air) -- Sitting --   06/04/25 2015 -- 116 19 109/56 77 96 % 28 2 L/min Nasal cannula -- -- --   06/04/25 1945 -- 112 17 123/68 82 95 % 28 2 L/min Nasal cannula -- Sitting --   06/04/25 1930 -- 110 18 115/56 -- 93 % -- -- None (Room air) -- -- --   06/04/25 1900 -- 108 19 114/53 77 97 % -- -- None (Room air) -- Sitting --   06/04/25 1700 -- 112 20 114/54 77 94 % -- -- -- -- -- 4   06/04/25 1617 97.9 °F (36.6 °C) 117 20 109/81 -- 94 % -- -- None (Room air) -- -- 4          Weight (last 2 days) before discharge       Date/Time Weight    06/06/25 1100 133 (293.21)            Pertinent Labs/Diagnostic Results:   Radiology:  CT head wo contrast   Final Interpretation by Jose R Avalos MD (06/04 2213)      No acute intracranial hemorrhage, midline shift, or mass effect.                  Workstation performed: [id number]         VAS lower limb venous duplex study, unilateral/limited   Final Interpretation by Kenyon Jason MD (06/04 2001)      CTA chest pe study   Final Interpretation by Miky Bajwa MD (06/04 1817)      Poor opacification of pulmonary artery limiting evaluation with no large obvious filling defect.                  Computerized Assisted Algorithm (CAA) may have aided analysis of applicable images.      Workstation performed: [id number]         MRI brain w wo contrast    (Results Pending)   MRI brain and orbits wo and w contrast    (Results Pending)     Cardiology:  ECG 12 lead   Final Result by Jennifer Larson MD (06/05 8958)   Sinus tachycardia   Left axis deviation   Poor anterior R wave progression is noted   Abnormal ECG   When compared with ECG of 04-Jun-2025 16:48, (unconfirmed)   ST no longer depressed in Inferior leads   Non-specific change in ST segment in Lateral leads   Nonspecific T wave abnormality no longer evident in Lateral leads   Confirmed by Jennifer Larson ([id number]) on  6/5/2025 12:58:01 PM      ECG 12 lead   Final Result by Jennifer Larson MD (06/05 1257)   Sinus tachycardia   Left axis deviation   Poor R wave progression   Abnormal ECG   When compared with ECG of 04-Jun-2025 16:21, (unconfirmed)   Nonspecific T wave abnormality, worse in Lateral leads   Confirmed by Jennifer Larson ([id number]) on 6/5/2025 12:57:30 PM      ECG 12 lead   Final Result by Jennifer Larson MD (06/05 1257)   Sinus tachycardia   Left axis deviation   Poor R wave progression   Abnormal ECG   When compared with ECG of 17-Feb-2025 10:21,   No significant change was found   Confirmed by Jennifer Larson ([id number]) on 6/5/2025 12:57:11 PM                Results from last 7 days   Lab Units 06/06/25 0431 06/05/25 0518 06/04/25 2111 06/04/25  1645   WBC Thousand/uL 5.83 8.00  --  10.16   HEMOGLOBIN g/dL 11.5 11.7  --  13.8   I STAT HEMOGLOBIN g/dl  --   --  13.9  --    HEMATOCRIT % 35.6 36.3  --  41.7   HEMATOCRIT, ISTAT %  --   --  41  --    PLATELETS Thousands/uL 180 190  --  211   TOTAL NEUT ABS Thousands/µL 2.63 4.66  --  6.36         Results from last 7 days   Lab Units 06/06/25 0431 06/05/25 0518 06/04/25 2159 06/04/25 2111 06/04/25  1645   SODIUM mmol/L 143 140 141  --  139   POTASSIUM mmol/L 3.0* 3.3* 3.0*  --  2.8*   CHLORIDE mmol/L 110* 109* 105  --  105   CO2 mmol/L 23 20* 22  --  23   CO2, I-STAT mmol/L  --   --   --  21  --    ANION GAP mmol/L 10 11 14*  --  11   BUN mg/dL 4* 3* 4*  --  3*   CREATININE mg/dL 0.41* 0.43* 0.62  --  0.66   EGFR ml/min/1.73sq m 118 116 103  --  101   CALCIUM mg/dL 8.1* 7.2* 8.0*  --  8.6   CALCIUM, IONIZED, ISTAT mmol/L  --   --   --  1.10*  --    MAGNESIUM mg/dL 1.8* 1.8*  --   --   --      Results from last 7 days   Lab Units 06/06/25  0431 06/04/25  2159 06/04/25  1645   AST U/L 22  --  35   ALT U/L 29  --  47   ALK PHOS U/L 71  --  83   TOTAL PROTEIN g/dL 5.5*  --  6.6   ALBUMIN g/dL 3.0*  --  3.6   TOTAL BILIRUBIN mg/dL 0.41  --  0.57   AMMONIA umol/L  --   41  --      Results from last 7 days   Lab Units 06/06/25  1050 06/06/25  0607 06/05/25  2211 06/05/25  1629 06/05/25  1100 06/04/25  1720   POC GLUCOSE mg/dl 116 104 159* 154* 123 128     Results from last 7 days   Lab Units 06/06/25  0431 06/05/25  0518 06/04/25  2159 06/04/25  1645   GLUCOSE RANDOM mg/dL 104 112 118 119         Results from last 7 days   Lab Units 06/05/25  0004   HEMOGLOBIN A1C % 6.1*   EAG mg/dl 128             Results from last 7 days   Lab Units 06/04/25  2111   I STAT BASE EXC mmol/L -3*   I STAT O2 SAT % 94*   ISTAT PH ART  7.437   I STAT ART PCO2 mm HG 29.4*   I STAT ART PO2 mm HG 66.0*   I STAT ART HCO3 mmol/L 19.9*         Results from last 7 days   Lab Units 06/04/25 2159 06/04/25  1855 06/04/25  1645   HS TNI 0HR ng/L  --   --  8   HS TNI 2HR ng/L  --  8  --    HSTNI D2 ng/L  --  0  --    HS TNI 4HR ng/L 7  --   --    HSTNI D4 ng/L -1  --   --          Results from last 7 days   Lab Units 06/06/25  0431 06/05/25  0518 06/04/25  1645   PROTIME seconds 32.8* 35.8* 32.2*   INR  3.25* 3.66* 3.17*   PTT seconds  --  63* 55*     Results from last 7 days   Lab Units 06/04/25 2159   TSH 3RD GENERATON uIU/mL 3.307         Results from last 7 days   Lab Units 06/05/25  0247 06/05/25  0004 06/04/25  1855 06/04/25  1645   LACTIC ACID mmol/L 1.3 3.4* 3.1* 2.9*             Results from last 7 days   Lab Units 06/04/25  1645   BNP pg/mL 30             Results from last 7 days   Lab Units 06/04/25 2159   CLARITY UA  Clear   COLOR UA  Yellow   SPEC GRAV UA  >=1.050*   PH UA  5.5   GLUCOSE UA mg/dl Negative   KETONES UA mg/dl Negative   BLOOD UA  Negative   PROTEIN UA mg/dl 30 (1+)*   NITRITE UA  Positive*   BILIRUBIN UA  Negative   UROBILINOGEN UA (BE) mg/dl <2.0   LEUKOCYTES UA  Negative   WBC UA /hpf None Seen   RBC UA /hpf None Seen   BACTERIA UA /hpf None Seen   EPITHELIAL CELLS WET PREP /hpf Moderate*   MUCUS THREADS  Occasional*             Results from last 7 days   Lab Units 06/04/25  8489    AMPH/METH  Negative   BARBITURATE UR  Negative   BENZODIAZEPINE UR  Negative   COCAINE UR  Negative   METHADONE URINE  Negative   OPIATE UR  Negative   PCP UR  Negative   THC UR  Positive*                     Results from last 7 days   Lab Units 06/04/25 1923 06/04/25 1922   BLOOD CULTURE  No Growth at 24 hrs. No Growth at 24 hrs.                   Network Utilization Review Department  ATTENTION: Please call with any questions or concerns to [phone] and carefully listen to the prompts so that you are directed to the right person. All voicemails are confidential.   For Discharge needs, contact Care Management DC Support Team at [phone] opt. [phone]  Send all requests for admission clinical reviews, approved or denied determinations and any other requests to dedicated fax number below belonging to the campus where the patient is receiving treatment. List of dedicated fax numbers for the Facilities:  FACILITY NAME UR FAX NUMBER   ADMISSION DENIALS (Administrative/Medical Necessity) [phone]   DISCHARGE SUPPORT TEAM (NETWORK) [phone]   PARENT CHILD HEALTH (Maternity/NICU/Pediatrics) [phone]   [facility] [phone]   [facility] [phone]   [facility] [phone]   [facility] [phone]   [facility] [phone]   [facility] [phone]   [facility] [phone]   [facility] [phone]   [facility] [phone]   [facility] [phone]   [facility] [phone]   [facility] [phone]

## 2025-06-08 LAB
BACTERIA BLD CULT: NORMAL
BACTERIA BLD CULT: NORMAL

## 2025-06-09 ENCOUNTER — HOSPITAL ENCOUNTER (EMERGENCY)
Facility: HOSPITAL | Age: 55
Discharge: HOME | End: 2025-06-09
Attending: EMERGENCY MEDICINE | Admitting: EMERGENCY MEDICINE
Payer: COMMERCIAL

## 2025-06-09 ENCOUNTER — APPOINTMENT (EMERGENCY)
Dept: VASCULAR ULTRASOUND | Facility: HOSPITAL | Age: 55
End: 2025-06-09
Payer: COMMERCIAL

## 2025-06-09 VITALS
SYSTOLIC BLOOD PRESSURE: 131 MMHG | OXYGEN SATURATION: 98 % | DIASTOLIC BLOOD PRESSURE: 81 MMHG | HEART RATE: 95 BPM | RESPIRATION RATE: 20 BRPM | TEMPERATURE: 98.1 F

## 2025-06-09 VITALS
HEART RATE: 104 BPM | TEMPERATURE: 98 F | DIASTOLIC BLOOD PRESSURE: 86 MMHG | SYSTOLIC BLOOD PRESSURE: 166 MMHG | OXYGEN SATURATION: 97 % | RESPIRATION RATE: 18 BRPM

## 2025-06-09 DIAGNOSIS — R60.0 BILATERAL LOWER EXTREMITY EDEMA: Primary | ICD-10-CM

## 2025-06-09 DIAGNOSIS — R60.0 LOWER EXTREMITY EDEMA: Primary | ICD-10-CM

## 2025-06-09 DIAGNOSIS — L03.90 CELLULITIS: ICD-10-CM

## 2025-06-09 LAB
ALBUMIN SERPL BCG-MCNC: 3.4 G/DL (ref 3.5–5)
ALBUMIN SERPL BCG-MCNC: 3.6 G/DL (ref 3.5–5)
ALP SERPL-CCNC: 77 U/L (ref 34–104)
ALP SERPL-CCNC: 78 U/L (ref 34–104)
ALT SERPL W P-5'-P-CCNC: 26 U/L (ref 7–52)
ALT SERPL W P-5'-P-CCNC: 29 U/L (ref 7–52)
ANION GAP SERPL CALCULATED.3IONS-SCNC: 8 MMOL/L (ref 4–13)
ANION GAP SERPL CALCULATED.3IONS-SCNC: 9 MMOL/L (ref 4–13)
APTT PPP: 33 SECONDS (ref 23–34)
AST SERPL W P-5'-P-CCNC: 27 U/L (ref 13–39)
AST SERPL W P-5'-P-CCNC: 28 U/L (ref 13–39)
BACTERIA BLD CULT: NORMAL
BACTERIA BLD CULT: NORMAL
BASOPHILS # BLD AUTO: 0.05 THOUSANDS/ÂΜL (ref 0–0.1)
BASOPHILS # BLD AUTO: 0.07 THOUSANDS/ÂΜL (ref 0–0.1)
BASOPHILS NFR BLD AUTO: 1 % (ref 0–1)
BASOPHILS NFR BLD AUTO: 1 % (ref 0–1)
BILIRUB SERPL-MCNC: 0.43 MG/DL (ref 0.2–1)
BILIRUB SERPL-MCNC: 0.47 MG/DL (ref 0.2–1)
BNP SERPL-MCNC: 33 PG/ML (ref 0–100)
BNP SERPL-MCNC: 39 PG/ML (ref 0–100)
BUN SERPL-MCNC: 16 MG/DL (ref 5–25)
BUN SERPL-MCNC: 19 MG/DL (ref 5–25)
CALCIUM ALBUM COR SERPL-MCNC: 9.4 MG/DL (ref 8.3–10.1)
CALCIUM SERPL-MCNC: 8.9 MG/DL (ref 8.4–10.2)
CALCIUM SERPL-MCNC: 8.9 MG/DL (ref 8.4–10.2)
CHLORIDE SERPL-SCNC: 106 MMOL/L (ref 96–108)
CHLORIDE SERPL-SCNC: 109 MMOL/L (ref 96–108)
CO2 SERPL-SCNC: 21 MMOL/L (ref 21–32)
CO2 SERPL-SCNC: 23 MMOL/L (ref 21–32)
CREAT SERPL-MCNC: 0.5 MG/DL (ref 0.6–1.3)
CREAT SERPL-MCNC: 0.53 MG/DL (ref 0.6–1.3)
EOSINOPHIL # BLD AUTO: 0.24 THOUSAND/ÂΜL (ref 0–0.61)
EOSINOPHIL # BLD AUTO: 0.27 THOUSAND/ÂΜL (ref 0–0.61)
EOSINOPHIL NFR BLD AUTO: 3 % (ref 0–6)
EOSINOPHIL NFR BLD AUTO: 3 % (ref 0–6)
ERYTHROCYTE [DISTWIDTH] IN BLOOD BY AUTOMATED COUNT: 16.1 % (ref 11.6–15.1)
ERYTHROCYTE [DISTWIDTH] IN BLOOD BY AUTOMATED COUNT: 16.3 % (ref 11.6–15.1)
GFR SERPL CREATININE-BSD FRML MDRD: 108 ML/MIN/1.73SQ M
GFR SERPL CREATININE-BSD FRML MDRD: 110 ML/MIN/1.73SQ M
GLUCOSE SERPL-MCNC: 91 MG/DL (ref 65–140)
GLUCOSE SERPL-MCNC: 98 MG/DL (ref 65–140)
HCT VFR BLD AUTO: 37.8 % (ref 34.8–46.1)
HCT VFR BLD AUTO: 38.7 % (ref 34.8–46.1)
HGB BLD-MCNC: 11.9 G/DL (ref 11.5–15.4)
HGB BLD-MCNC: 12.6 G/DL (ref 11.5–15.4)
IMM GRANULOCYTES # BLD AUTO: 0.07 THOUSAND/UL (ref 0–0.2)
IMM GRANULOCYTES # BLD AUTO: 0.1 THOUSAND/UL (ref 0–0.2)
IMM GRANULOCYTES NFR BLD AUTO: 1 % (ref 0–2)
IMM GRANULOCYTES NFR BLD AUTO: 1 % (ref 0–2)
INR PPP: 1.35 (ref 0.85–1.19)
LYMPHOCYTES # BLD AUTO: 2.48 THOUSANDS/ÂΜL (ref 0.6–4.47)
LYMPHOCYTES # BLD AUTO: 2.56 THOUSANDS/ÂΜL (ref 0.6–4.47)
LYMPHOCYTES NFR BLD AUTO: 25 % (ref 14–44)
LYMPHOCYTES NFR BLD AUTO: 35 % (ref 14–44)
MCH RBC QN AUTO: 28.1 PG (ref 26.8–34.3)
MCH RBC QN AUTO: 29 PG (ref 26.8–34.3)
MCHC RBC AUTO-ENTMCNC: 31.5 G/DL (ref 31.4–37.4)
MCHC RBC AUTO-ENTMCNC: 32.6 G/DL (ref 31.4–37.4)
MCV RBC AUTO: 89 FL (ref 82–98)
MCV RBC AUTO: 89 FL (ref 82–98)
MONOCYTES # BLD AUTO: 0.85 THOUSAND/ÂΜL (ref 0.17–1.22)
MONOCYTES # BLD AUTO: 1.06 THOUSAND/ÂΜL (ref 0.17–1.22)
MONOCYTES NFR BLD AUTO: 10 % (ref 4–12)
MONOCYTES NFR BLD AUTO: 12 % (ref 4–12)
NEUTROPHILS # BLD AUTO: 3.47 THOUSANDS/ÂΜL (ref 1.85–7.62)
NEUTROPHILS # BLD AUTO: 6.17 THOUSANDS/ÂΜL (ref 1.85–7.62)
NEUTS SEG NFR BLD AUTO: 48 % (ref 43–75)
NEUTS SEG NFR BLD AUTO: 60 % (ref 43–75)
NRBC BLD AUTO-RTO: 0 /100 WBCS
NRBC BLD AUTO-RTO: 0 /100 WBCS
PLATELET # BLD AUTO: 244 THOUSANDS/UL (ref 149–390)
PLATELET # BLD AUTO: 245 THOUSANDS/UL (ref 149–390)
PMV BLD AUTO: 10 FL (ref 8.9–12.7)
PMV BLD AUTO: 9.9 FL (ref 8.9–12.7)
POTASSIUM SERPL-SCNC: 3.7 MMOL/L (ref 3.5–5.3)
POTASSIUM SERPL-SCNC: 3.8 MMOL/L (ref 3.5–5.3)
PROT SERPL-MCNC: 6.1 G/DL (ref 6.4–8.4)
PROT SERPL-MCNC: 6.4 G/DL (ref 6.4–8.4)
PROTHROMBIN TIME: 17.1 SECONDS (ref 12.3–15)
RBC # BLD AUTO: 4.24 MILLION/UL (ref 3.81–5.12)
RBC # BLD AUTO: 4.35 MILLION/UL (ref 3.81–5.12)
SODIUM SERPL-SCNC: 138 MMOL/L (ref 135–147)
SODIUM SERPL-SCNC: 138 MMOL/L (ref 135–147)
VIT B1 BLD-SCNC: 85.3 NMOL/L (ref 66.5–200)
WBC # BLD AUTO: 10.23 THOUSAND/UL (ref 4.31–10.16)
WBC # BLD AUTO: 7.16 THOUSAND/UL (ref 4.31–10.16)

## 2025-06-09 PROCEDURE — 99284 EMERGENCY DEPT VISIT MOD MDM: CPT

## 2025-06-09 PROCEDURE — 93970 EXTREMITY STUDY: CPT

## 2025-06-09 PROCEDURE — 85025 COMPLETE CBC W/AUTO DIFF WBC: CPT | Performed by: EMERGENCY MEDICINE

## 2025-06-09 PROCEDURE — 93970 EXTREMITY STUDY: CPT | Performed by: SURGERY

## 2025-06-09 PROCEDURE — 85610 PROTHROMBIN TIME: CPT | Performed by: EMERGENCY MEDICINE

## 2025-06-09 PROCEDURE — 93005 ELECTROCARDIOGRAM TRACING: CPT

## 2025-06-09 PROCEDURE — 36415 COLL VENOUS BLD VENIPUNCTURE: CPT | Performed by: EMERGENCY MEDICINE

## 2025-06-09 PROCEDURE — 80053 COMPREHEN METABOLIC PANEL: CPT | Performed by: EMERGENCY MEDICINE

## 2025-06-09 PROCEDURE — 99285 EMERGENCY DEPT VISIT HI MDM: CPT | Performed by: EMERGENCY MEDICINE

## 2025-06-09 PROCEDURE — 85730 THROMBOPLASTIN TIME PARTIAL: CPT | Performed by: EMERGENCY MEDICINE

## 2025-06-09 PROCEDURE — 85025 COMPLETE CBC W/AUTO DIFF WBC: CPT

## 2025-06-09 PROCEDURE — 83880 ASSAY OF NATRIURETIC PEPTIDE: CPT

## 2025-06-09 PROCEDURE — 83880 ASSAY OF NATRIURETIC PEPTIDE: CPT | Performed by: EMERGENCY MEDICINE

## 2025-06-09 PROCEDURE — 80053 COMPREHEN METABOLIC PANEL: CPT

## 2025-06-09 RX ORDER — FUROSEMIDE 40 MG/1
20 TABLET ORAL ONCE
Status: COMPLETED | OUTPATIENT
Start: 2025-06-09 | End: 2025-06-09

## 2025-06-09 RX ADMIN — FUROSEMIDE 20 MG: 40 TABLET ORAL at 12:12

## 2025-06-09 NOTE — ED PROVIDER NOTES
Time reflects when diagnosis was documented in both MDM as applicable and the Disposition within this note       Time User Action Codes Description Comment    6/9/2025  6:05 AM Collins Schaefer [R60.0] Lower extremity edema           ED Disposition       ED Disposition   Discharge    Condition   Stable    Date/Time   Mon Jun 9, 2025  6:05 AM    Comment   Blancajob Mason discharge to home/self care.                   Assessment & Plan       Medical Decision Making  54-year-old female presented to the emergency department for evaluation of leg swelling.  Differential includes but not limited to infection, lymphedema, venous stasis, trauma, heart failure, arrhythmia, electrolyte abnormality, MINNIE.  These and other diagnoses were considered.  On arrival patient awake, alert and in no acute distress.  Initial vital signs unremarkable. Blood work done in the emergency department showed the patient had an INR of 1.35.  Blood work otherwise grossly unremarkable and consistent with the patient's baseline.  BNP within normal limits.  Recommendation was made for the patient to take her Coumadin as prescribed and to start taking the Duricef that was prescribed to her when she was discharged.  Recommended she follow-up with her PCP.  Return precautions were discussed.    The patient (and/or family present) agrees with the plan for discharge and feels comfortable to go home with proper follow-up. Diagnostic tests were reviewed. Diagnosis, care plan and treatment options were discussed. All questions were answered prior to discharge. I considered the patient's other medical conditions as applicable/noted above in my medical decision making. Advised the patient to return for worsening or additional problems. The patient (and/or family present) verbalized understanding of the discharge instructions and warnings that would necessitate return to the Emergency Department.          Amount and/or Complexity of Data  Reviewed  External Data Reviewed: labs, ECG and notes.     Details: Prior labs, EKG and notes reviewed  Labs: ordered. Decision-making details documented in ED Course.  ECG/medicine tests: ordered and independent interpretation performed.        ED Course as of 06/09/25 0622   Mon Jun 09, 2025 0517 Per chart review patient discharged with a prescription for Duricef.  Patient reports that she has not yet received this prescription from her ACT team.  States that she is scheduled to meet with them today to receive the medication.   0536 WBC: 7.16   0548 POCT INR(!): 1.35  Discussed results with the patient.  Patient reported that she was told to stop taking her warfarin for a day or 2 when she was discharged.  She states that she started taking it again yesterday.   0605 BNP: 39       Medications - No data to display    ED Risk Strat Scores                    No data recorded        SBIRT 22yo+      Flowsheet Row Most Recent Value   Initial Alcohol Screen: US AUDIT-C     1. How often do you have a drink containing alcohol? 0 Filed at: 06/09/2025 0512   2. How many drinks containing alcohol do you have on a typical day you are drinking?  0 Filed at: 06/09/2025 0512   3b. FEMALE Any Age, or MALE 65+: How often do you have 4 or more drinks on one occassion? 0 Filed at: 06/09/2025 0512   Audit-C Score 0 Filed at: 06/09/2025 0512   OLEG: How many times in the past year have you...    Used an illegal drug or used a prescription medication for non-medical reasons? Never Filed at: 06/09/2025 0512                            History of Present Illness       Chief Complaint   Patient presents with    Leg Swelling     Patient stating increased leg swelling and pain over the last 2 days. Redness, tender and warm to touch. Denies cp, sob.        Past Medical History[1]   Past Surgical History[2]   Family History[3]   Social History[4]   E-Cigarette/Vaping    E-Cigarette Use Current Some Day User     Comments medical THC        E-Cigarette/Vaping Substances    Nicotine No     THC Yes     CBD No     Flavoring No     Other No     Unknown No       I have reviewed and agree with the history as documented.     54-year-old female presents to the emergency department for evaluation of leg swelling.  Patient reports history of chronic leg swelling over the past several years.  States that she was discharged from the hospital 2 days ago and since then has been sleeping in a recliner.  Reports worsening swelling to her bilateral lower extremities.  Reports redness to the area which is unchanged.  She denies any recent falls or direct trauma to the area.  Patient reports that she has been compliant with her prescribed medications.  No fever, chills, nausea, vomiting, diarrhea.  No chest pain.  No shortness of breath.  No localized numbness, tingling or weakness.        Review of Systems   Constitutional:  Negative for chills and fever.   HENT:  Negative for ear pain and sore throat.    Eyes:  Negative for pain and visual disturbance.   Respiratory:  Negative for cough and shortness of breath.    Cardiovascular:  Negative for chest pain and palpitations.   Gastrointestinal:  Negative for abdominal pain and vomiting.   Genitourinary:  Negative for dysuria and hematuria.   Musculoskeletal:  Negative for arthralgias and back pain.   Skin:  Negative for color change and rash.   Neurological:  Negative for seizures and syncope.   All other systems reviewed and are negative.          Objective       ED Triage Vitals [06/09/25 0509]   Temperature Pulse Blood Pressure Respirations SpO2 Patient Position - Orthostatic VS   98.1 °F (36.7 °C) 95 131/81 20 98 % --      Temp Source Heart Rate Source BP Location FiO2 (%) Pain Score    Oral Monitor -- -- 5      Vitals      Date and Time Temp Pulse SpO2 Resp BP Pain Score FACES Pain Rating User   06/09/25 0509 98.1 °F (36.7 °C) 95 98 % 20 131/81 5 -- CW            Physical Exam  Vitals and nursing note reviewed.    Constitutional:       General: She is not in acute distress.     Appearance: She is well-developed. She is obese.   HENT:      Head: Normocephalic and atraumatic.     Eyes:      Conjunctiva/sclera: Conjunctivae normal.       Cardiovascular:      Rate and Rhythm: Normal rate and regular rhythm.      Heart sounds: No murmur heard.  Pulmonary:      Effort: Pulmonary effort is normal. No respiratory distress.      Breath sounds: Normal breath sounds.   Abdominal:      Palpations: Abdomen is soft.      Tenderness: There is no abdominal tenderness.     Musculoskeletal:         General: No swelling.      Cervical back: Neck supple.      Right lower leg: Edema present.      Left lower leg: Edema present.      Comments: Distal lower extremity pulse, motor and sensation intact and symmetric bilaterally     Skin:     General: Skin is warm and dry.      Capillary Refill: Capillary refill takes less than 2 seconds.     Neurological:      Mental Status: She is alert.     Psychiatric:         Mood and Affect: Mood normal.         Results Reviewed       Procedure Component Value Units Date/Time    Comprehensive metabolic panel [[id number]]  (Abnormal) Collected: 06/09/25 0521    Lab Status: Final result Specimen: Blood from Arm, Left Updated: 06/09/25 0551     Sodium 138 mmol/L      Potassium 3.8 mmol/L      Chloride 106 mmol/L      CO2 23 mmol/L      ANION GAP 9 mmol/L      BUN 16 mg/dL      Creatinine 0.53 mg/dL      Glucose 91 mg/dL      Calcium 8.9 mg/dL      Corrected Calcium 9.4 mg/dL      AST 28 U/L      ALT 29 U/L      Alkaline Phosphatase 78 U/L      Total Protein 6.1 g/dL      Albumin 3.4 g/dL      Total Bilirubin 0.43 mg/dL      eGFR 108 ml/min/1.73sq m     Narrative:      National Kid[employer] Disease Foundation guidelines for Chronic Kidney Disease (CKD):     Stage 1 with normal or high GFR (GFR > 90 mL/min/1.73 square meters)    Stage 2 Mild CKD (GFR = 60-89 mL/min/1.73 square meters)    Stage 3A Moderate CKD (GFR = 45-59  mL/min/1.73 square meters)    Stage 3B Moderate CKD (GFR = 30-44 mL/min/1.73 square meters)    Stage 4 Severe CKD (GFR = 15-29 mL/min/1.73 square meters)    Stage 5 End Stage CKD (GFR <15 mL/min/1.73 square meters)  Note: GFR calculation is accurate only with a steady state creatinine    B-Type Natriuretic Peptide(BNP) [[id number]]  (Normal) Collected: 06/09/25 0521    Lab Status: Final result Specimen: Blood from Arm, Left Updated: 06/09/25 0551     BNP 39 pg/mL     APTT [[id number]]  (Normal) Collected: 06/09/25 0521    Lab Status: Final result Specimen: Blood from Arm, Left Updated: 06/09/25 0542     PTT 33 seconds     Protime-INR [[id number]]  (Abnormal) Collected: 06/09/25 0521    Lab Status: Final result Specimen: Blood from Arm, Left Updated: 06/09/25 0542     Protime 17.1 seconds      INR 1.35    Narrative:      INR Therapeutic Range    Indication                                             INR Range      Atrial Fibrillation                                               2.0-3.0  Hypercoagulable State                                    2.0.2.3  Left Ventricular Asist Device                            2.0-3.0  Mechanical Heart Valve                                  -    Aortic(with afib, MI, embolism, HF, LA enlargement,    and/or coagulopathy)                                     2.0-3.0 (2.5-3.5)     Mitral                                                             2.5-3.5  Prosthetic/Bioprosthetic Heart Valve               2.0-3.0  Venous thromboembolism (VTE: VT, PE        2.0-3.0    CBC and differential [[id number]]  (Abnormal) Collected: 06/09/25 0521    Lab Status: Final result Specimen: Blood from Arm, Left Updated: 06/09/25 0529     WBC 7.16 Thousand/uL      RBC 4.24 Million/uL      Hemoglobin 11.9 g/dL      Hematocrit 37.8 %      MCV 89 fL      MCH 28.1 pg      MCHC 31.5 g/dL      RDW 16.1 %      MPV 9.9 fL      Platelets 244 Thousands/uL      nRBC 0 /100 WBCs      Segmented % 48 %      Immature Grans %  1 %      Lymphocytes % 35 %      Monocytes % 12 %      Eosinophils Relative 3 %      Basophils Relative 1 %      Absolute Neutrophils 3.47 Thousands/µL      Absolute Immature Grans 0.07 Thousand/uL      Absolute Lymphocytes 2.48 Thousands/µL      Absolute Monocytes 0.85 Thousand/µL      Eosinophils Absolute 0.24 Thousand/µL      Basophils Absolute 0.05 Thousands/µL             No orders to display       ECG 12 Lead Documentation Only    Date/Time: 6/9/2025 5:28 AM    Performed by: Collins Schaefer MD  Authorized by: Collins Schaefer MD    ECG reviewed by me, the ED Provider: yes    Patient location:  ED  Previous ECG:     Previous ECG:  Compared to current    Similarity:  Changes noted    Comparison to cardiac monitor: Yes    Interpretation:     Interpretation: normal    Rate:     ECG rate assessment: normal    Rhythm:     Rhythm: sinus rhythm    Ectopy:     Ectopy: none    QRS:     QRS axis:  Normal  Conduction:     Conduction: normal    ST segments:     ST segments:  Normal  T waves:     T waves: normal        ED Medication and Procedure Management   Prior to Admission Medications   Prescriptions Last Dose Informant Patient Reported? Taking?   Aciphex 20 MG tablet  Self Yes No   Sig: Take by mouth as needed   Cholecalciferol (Vitamin D3) 125 MCG (5000 UT) capsule  Self No No   Sig: Take 1 capsule (5,000 Units total) by mouth daily   Invega Trinza 819 MG/2.63ML DWIGHT   Yes No   OXcarbazepine (TRILEPTAL) 150 mg tablet  Self No No   Sig: Take 3 tablets (450 mg total) by mouth every 12 (twelve) hours   albuterol (PROVENTIL HFA,VENTOLIN HFA) 90 mcg/act inhaler   Yes No   Sig: Inhale 2 puffs every 6 (six) hours as needed for wheezing   atoMOXetine (STRATTERA) 60 mg capsule  Self Yes No   Sig: Take 60 mg by mouth in the morning.   atorvastatin (LIPITOR) 80 mg tablet   No No   Sig: Take 1 tablet (80 mg total) by mouth daily   buPROPion (WELLBUTRIN XL) 150 mg 24 hr tablet  Self Yes No   buPROPion (WELLBUTRIN XL) 300  mg 24 hr tablet   Yes No   cefadroxil (DURICEF) 500 mg capsule   No No   Sig: Take 1 capsule (500 mg total) by mouth every 12 (twelve) hours for 6 days   cloNIDine (CATAPRES) 0.3 mg tablet   No No   Sig: Take 1 tablet (0.3 mg total) by mouth 2 (two) times a day   cyanocobalamin (VITAMIN B-12) 1000 MCG tablet  Self No No   Sig: Take 1 tablet (1,000 mcg total) by mouth daily   furosemide (LASIX) 20 mg tablet   No No   Sig: TAKE 1 TABLET BY MOUTH DAILY   levocetirizine (XYZAL) 5 MG tablet  Self No No   Sig: TAKE ONE TABLET BY MOUTH IN THE EVENING DAILY   levothyroxine 125 mcg tablet   No No   Sig: TAKE 1 TABLET BY MOUTH DAILY IN THE EARLY MORNING   losartan (COZAAR) 100 MG tablet   No No   Sig: TAKE 1 TABLET BY MOUTH DAILY   metFORMIN (GLUCOPHAGE-XR) 750 mg 24 hr tablet   No No   Sig: TAKE 1 TABLET BY MOUTH DAILY WITH BREAKFAST   naltrexone (REVIA) 50 mg tablet  Self No No   Sig: TAKE 1 TABLET BY MOUTH DAILY   naproxen (Naprosyn) 500 mg tablet  Self No No   Sig: Take 1 tablet (500 mg total) by mouth 2 (two) times a day with meals Take with food.   oxybutynin (DITROPAN) 5 mg tablet  Self No No   Sig: Take 1 tablet (5 mg total) by mouth 2 (two) times a day   pantoprazole (PROTONIX) 40 mg tablet  Self No No   Sig: Take 1 tablet (40 mg total) by mouth daily in the early morning   pilocarpine (SALAGEN) 5 mg tablet  Self No No   Sig: TAKE 1 TABLET (5 MG TOTAL) BY MOUTH THREE (THREE) TIMES A DAY   rOPINIRole (REQUIP) 1 mg tablet  Self Yes No   Sig: Take by mouth daily   topiramate (TOPAMAX) 50 MG tablet   No No   Sig: Take 3 tablets (150 mg total) by mouth 2 (two) times a day for 1 day, THEN 2 tablets (100 mg total) 2 (two) times a day for 1 day, THEN 1.5 tablets (75 mg total) 2 (two) times a day for 1 day, THEN 1 tablet (50 mg total) 2 (two) times a day for 1 day, THEN 0.5 tablets (25 mg total) 2 (two) times a day for 1 day, THEN 0.5 tablets (25 mg total) daily for 1 day.   traZODone (DESYREL) 50 mg tablet   No No   Sig:  "Take 1 tablet (50 mg total) by mouth daily at bedtime as needed for sleep (insomnia)   trospium chloride (SANCTURA) 20 mg tablet  Self Yes No   Sig: Take 20 mg by mouth in the morning and 20 mg in the evening.   warfarin (COUMADIN) 2 mg tablet   No No   Sig: Do not start before June 7, 2025.      Facility-Administered Medications: None     Patient's Medications   Discharge Prescriptions    No medications on file     No discharge procedures on file.  ED SEPSIS DOCUMENTATION   Time reflects when diagnosis was documented in both MDM as applicable and the Disposition within this note       Time User Action Codes Description Comment    6/9/2025  6:05 AM Collins Schaefer Add [R60.0] Lower extremity edema                      [1]   Past Medical History:  Diagnosis Date    Acute deep vein thrombosis of lower leg, left (Formerly [facility]) 10/16/2023    Acute heart failure, unspecified heart failure type (Formerly [facility]) 05/06/2024    Anxiety     Arthritis     oa cassandra knees    Asthma     good control- no medications    Yan's esophagus     Bipolar affective disorder ([facility])     Cannabis abuse with unspecified 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Formerly [facility])     Chronic pain disorder     lumbar    Contusion of elbow 12/21/2021    COPD (chronic obstructive pulmonary disease) (Formerly [facility])     CPAP (continuous positive airway pressure) dependence     DDD (degenerative disc disease), lumbar 04/09/2015    Degenerative disc disease at L5-S1 level     Deliberate self-cutting     9/15/22per pt has not done recently-- does see a therapist and psychiatrist regularly    Depression 09/16/2008    Diarrhea 01/06/2022    Disease of thyroid gland     hypo    MARTINEZ (dyspnea on exertion)     per pt \"has had this with exertion and not new\"    Drug overdose 10/28/2008    suicide attempt and again drug overdose 7/2022-- [facility] floor and than transferred to [facility]    Dysphagia     Dyspnea     Ecchymosis 01/06/2022    Edema     BLE    Elevated troponin 09/02/2023    Family history of " blood clots     GERD (gastroesophageal reflux disease)     Grief 11/11/2023    Headache(784.0)     Headache, tension-type     Hemorrhage of rectum and anus 10/02/2017    Formatting of this note might be different from the original.  Added automatically from request for surgery [id number]    High blood pressure     History of COVID-19 12/30/2020    Symptoms started on 12/30/2020 and then got worse.  Today she is feeling a little bit better.  She is a candidate for monoclonal antibody infusion and set for 1/6/21 @ 1pm at [facility]. 01/07/21 - telemedicine -     History of kidney stones     Hyperlipidemia     Hypertension     Ingrown toenail 12/02/2023    Intentional overdose (HCC) 09/27/2023    Intentional self-harm by unspecified sharp object, sequela (HCC) 02/09/2023    Irritable bowel syndrome     Knee pain, bilateral     Especially right    Knee pain, right 02/23/2022    Medial epicondylitis of elbow, left 04/03/2018    Formatting of this note might be different from the original.  Added automatically from request for surgery [id number]    Medial epicondylitis of right elbow 07/17/2023    Medical marijuana use     Memory difficulties 08/06/2020    Memory loss     Migraines     Muscle weakness     legs    Obesity     Other psychoactive substance abuse with unspecified psychoactive substance-induced disorder (HCC) 05/06/2024    Overactive bladder     Polysubstance abuse (HCC) 09/29/2023    Potential for cognitive impairment 06/17/2021    Primary osteoarthritis of both knees 08/08/2018    Pulmonary emboli (HCC)     Restrictive lung disease 02/01/2017    Last Assessment & Plan:   Formatting of this note might be different from the original.  I reviewed this problem throughout the rest of the note. Please refer to the other assessments for details.    Risk for falls     Sjogren's disease (HCC)     Sleep apnea     Stress incontinence     Suicidal deliberate poisoning (HCC) 07/13/2022    Suicidal ideations     Teeth  missing     Toxic encephalopathy 11/08/2023    Tremor     per pt Tremors of Right Leg and both Arms    Visual impairment     Wears glasses    [2]   Past Surgical History:  Procedure Laterality Date    BREAST CYST EXCISION Right 1989    CARPAL TUNNEL RELEASE Left     CHOLECYSTECTOMY  05/2003    Laparoscopic    COLONOSCOPY      01/12/2009    DILATION AND CURETTAGE OF UTERUS      ELBOW SURGERY Left     x2. No hardware    ESOPHAGOGASTRODUODENOSCOPY      FOOT SURGERY Left     Plantar fasciotomy    HERNIA REPAIR      HYSTERECTOMY      laporoscopic, partial    KNEE ARTHROSCOPY Bilateral     OOPHORECTOMY Left 10/2015    RI CORRJ HLX VLGS BNCTY SESMDC JOINT ARTHRODESIS Right 8/2/2024    Procedure: RIGHT FOOT LAPIDUS BUNIONECTOMY, 2ND HAMMERTOE REPAIR, SECOND METATARSAL OSTEOTOMY WITH SECOND PLANTAR PLATE REPAIR;  Surgeon: Kaz Bautista DPM;  Location: [facility];  Service: Podiatry    RI GASTROCNEMIUS RECESSION Left 02/24/2021    Procedure: RECESSION GASTROC OPEN;  Surgeon: Nomi Yang DPM;  Location:  MAIN OR;  Service: Podiatry    RI REMOVAL IMPLANT DEEP Right 5/14/2025    Procedure: FOOT REMOVAL OF PAINFUL HARDWARE, REVISION OF SCAR;  Surgeon: Kaz Bautista DPM;  Location: [facility];  Service: Podiatry    RI RPR UMBILICAL HRNA 5 YRS/> REDUCIBLE N/A 04/24/2019    Procedure: REPAIR HERNIA UMBILICAL LAPAROSCOPIC W/ ROBOTICS;  Surgeon: Anahi Colon MD;  Location: [facility];  Service: General    RI SPHINCTEROTOMY ANAL DIVISION SPHINCTER SPX N/A 08/31/2018    Procedure: EUA, LEFT PARTIAL INTERNAL SPHINCTEROTOMY;  Surgeon: Tien Reyes MD;  Location: [facility];  Service: Colorectal    RI TNOT ELBOW LATERAL/MEDIAL DEBRIDE OPEN TDN RPR Left 09/20/2022    Procedure: RELEASE EPICONDYLAR ELBOW MEDIAL;  Surgeon: Kyree Winkler MD;  [facility];  Service: Orthopedics    REDUCTION MAMMAPLASTY Bilateral 1999    REPAIR LACERATION Left     left hand-5/18/2009    REPLACEMENT TOTAL KNEE  Right     ROTATOR CUFF REPAIR Left     TONSILECTOMY AND ADNOIDECTOMY      US GUIDED MSK PROCEDURE  2021    VASCULAR SURGERY      VEIN LIGATION Bilateral     WISDOM TOOTH EXTRACTION     [3]   Family History  Problem Relation Name Age of Onset    Kidney cancer Mother anastacio     Diabetes Mother anastacio     Depression Mother anastacio     Stroke Mother anastacio     Arthritis Mother anastacio     Cancer Mother anastacio     Psychiatric Illness Mother anastacio     No Known Problems Father      No Known Problems Sister      No Known Problems Maternal Grandmother      No Known Problems Maternal Grandfather      No Known Problems Paternal Grandmother      No Known Problems Paternal Grandfather      Colon cancer Maternal Uncle      Colon cancer Maternal Uncle      Colon cancer Paternal Uncle      Colon cancer Family fx     Alcohol abuse Sister bharti     Asthma Sister bharti    [4]   Social History  Tobacco Use    Smoking status: Former     Current packs/day: 0.00     Average packs/day: 2.0 packs/day for 33.0 years (66.0 ttl pk-yrs)     Types: Cigarettes     Start date: 1985     Quit date: 2018     Years since quittin.4    Smokeless tobacco: Never    Tobacco comments:     Started at age 16.   Vaping Use    Vaping status: Some Days    Substances: THC   Substance Use Topics    Alcohol use: Not Currently     Comment: Recovering alcoholic    Drug use: Yes     Types: Marijuana     Comment: Former meth use, medicinal marijuana(medical card)        Collins Schaefer MD  [phone]

## 2025-06-09 NOTE — ED PROVIDER NOTES
Time reflects when diagnosis was documented in both MDM as applicable and the Disposition within this note       Time User Action Codes Description Comment    6/9/2025 12:53 PM Neo Johnson [R60.0] Bilateral lower extremity edema     6/9/2025 12:53 PM Neo Johnson [L03.90] Cellulitis           ED Disposition       ED Disposition   Discharge    Condition   Stable    Date/Time   Mon Jun 9, 2025 12:53 PM    Comment   Blanca Mason discharge to home/self care.                   Assessment & Plan       Medical Decision Making  Female patient who presented to the emergency department for bilateral lower extremity edema.  On physical examination, patient was noted to have 2+ pitting edema of her bilateral lower extremities as well as warmth and erythema noted.  Patient's labs showed no acute concerns at this time with only mild leukocytosis.  Her bilateral lower extremity duplex showed no concerns for DVT.  While in the emergency department, patient was given her home dose of oral Lasix.  She was plan for discharge advised follow-up outpatient with PCP as needed.  She was also advised to  the prescription of antibiotics from her podiatrist which she agreed.  She was instructed return to the emergency department if her symptoms worsen.  She was agreeable to plan.    Differential diagnoses include but not limited to cellulitis, DVT, CHF exacerbation, CKD.      Amount and/or Complexity of Data Reviewed  Labs:  Decision-making details documented in ED Course.    Risk  Prescription drug management.        ED Course as of 06/09/25 1804   Mon Jun 09, 2025   1228 BNP: 33   1228 WBC(!): 10.23   1238 Ultrasound tech notified provider that bilateral lower extremity duplex was negative for DVT as per their interpretation.   1253 Patient has an outpatient prescription for antibiotics that were prescribed by her Podiatrist today for her lower extremity erythema and warmth. Plan is for her to pickup following discharge.        Medications   furosemide (LASIX) tablet 20 mg (20 mg Oral Given 6/9/25 1212)       ED Risk Strat Scores                    No data recorded                            History of Present Illness       Chief Complaint   Patient presents with    Leg Swelling     Recent admission. Bilateral leg swelling is intensifying since admission and referred by her podiatrist. No chest pain/sob       Past Medical History[1]   Past Surgical History[2]   Family History[3]   Social History[4]   E-Cigarette/Vaping    E-Cigarette Use Current Some Day User     Comments medical THC       E-Cigarette/Vaping Substances    Nicotine No     THC Yes     CBD No     Flavoring No     Other No     Unknown No       I have reviewed and agree with the history as documented.     54-year-old female with extensive PMH including HTN, HLD, COPD, and bilateral leg edema who presents the emergency department for leg swelling.  Patient states that she was seen at [place] emergency department earlier today and then discharged home.  While attending her podiatry appointment, she was referred back to the emergency department due to the increased warmth of her bilateral legs.  Patient states she has been taking her Lasix as prescribed however, has not been wearing stockings due to inability to obtain them.  She feels that her right leg is more swollen than the left and is concerned she may have a DVT given her history.  Patient is on warfarin and was recently discharged from the hospital on 6/6/2025 after being admitted for confusion.  Patient's warfarin at that time was supratherapeutic and held.  No other acute concerns at this time. Denies chest pain, SOB, cough, abdominal pain, n/v/d, fever, chills, dizziness, lightheadedness, HA, dysuria, hematuria, hematochezia, or melena.           Review of Systems   Constitutional:  Negative for appetite change, chills, diaphoresis, fatigue and fever.   HENT:  Negative for congestion, ear pain, postnasal drip,  rhinorrhea, sore throat and trouble swallowing.    Eyes:  Negative for pain and visual disturbance.   Respiratory:  Negative for cough and shortness of breath.    Cardiovascular:  Positive for leg swelling. Negative for chest pain and palpitations.   Gastrointestinal:  Negative for abdominal pain, constipation, diarrhea, nausea and vomiting.   Genitourinary:  Negative for decreased urine volume, dysuria and hematuria.   Musculoskeletal:  Negative for arthralgias and back pain.   Skin:  Negative for color change and rash.   Neurological:  Negative for dizziness, seizures, syncope, weakness, light-headedness and headaches.   All other systems reviewed and are negative.          Objective       ED Triage Vitals   Temperature Pulse Blood Pressure Respirations SpO2 Patient Position - Orthostatic VS   06/09/25 1104 06/09/25 1104 06/09/25 1104 06/09/25 1104 06/09/25 1104 06/09/25 1104   98 °F (36.7 °C) 101 159/84 20 95 % Sitting      Temp Source Heart Rate Source BP Location FiO2 (%) Pain Score    06/09/25 1104 06/09/25 1104 06/09/25 1104 -- 06/09/25 1211    Oral Monitor Right arm  No Pain      Vitals      Date and Time Temp Pulse SpO2 Resp BP Pain Score FACES Pain Rating User   06/09/25 1211 -- 104 97 % 18 166/86 No Pain -- AP   06/09/25 1104 98 °F (36.7 °C) 101 95 % 20 159/84 -- -- KB            Physical Exam  Vitals and nursing note reviewed.   Constitutional:       General: She is not in acute distress.     Appearance: Normal appearance. She is normal weight.      Comments: Tremulous at bedside likely due to anxiousness.   HENT:      Head: Normocephalic and atraumatic.      Right Ear: External ear normal.      Left Ear: External ear normal.      Nose: Nose normal.      Mouth/Throat:      Pharynx: Oropharynx is clear.     Eyes:      Conjunctiva/sclera: Conjunctivae normal.       Cardiovascular:      Rate and Rhythm: Normal rate and regular rhythm.      Pulses: Normal pulses.      Heart sounds: Normal heart sounds.       Comments: RRR with +S1 and S2, no murmurs appreciated on exam. Peripheral pulses intact.    Pulmonary:      Effort: Pulmonary effort is normal. No respiratory distress.      Breath sounds: Normal breath sounds. No wheezing, rhonchi or rales.      Comments: CTABL with no abnormal lung sounds such as wheezes or rales appreciated on exam.     Abdominal:      General: Abdomen is flat. Bowel sounds are normal. There is no distension.      Palpations: Abdomen is soft.      Tenderness: There is no abdominal tenderness.      Comments: Soft, non tender, normo-active bowel sounds. Without rigidity, guarding, or distension.       Musculoskeletal:         General: Normal range of motion.      Cervical back: Normal range of motion.      Right lower leg: Edema present.      Left lower leg: Edema present.      Comments: 2+ pitting edema of bilateral lower extremities and increased warmth equally on both sides.     Skin:     General: Skin is warm and dry.     Neurological:      General: No focal deficit present.      Mental Status: She is alert and oriented to person, place, and time. Mental status is at baseline.      Comments: CN grossly intact on visualization. No focal neurologic deficits noted on exam.  5/5 strength in all extremities. Neurovascularly intact with normal sensation and motor function.           Results Reviewed       Procedure Component Value Units Date/Time    B-Type Natriuretic Peptide(BNP) [[id number]]  (Normal) Collected: 06/09/25 1108    Lab Status: Final result Specimen: Blood from Arm, Left Updated: 06/09/25 1211     BNP 33 pg/mL     Comprehensive metabolic panel [[id number]]  (Abnormal) Collected: 06/09/25 1108    Lab Status: Final result Specimen: Blood from Arm, Left Updated: 06/09/25 1203     Sodium 138 mmol/L      Potassium 3.7 mmol/L      Chloride 109 mmol/L      CO2 21 mmol/L      ANION GAP 8 mmol/L      BUN 19 mg/dL      Creatinine 0.50 mg/dL      Glucose 98 mg/dL      Calcium 8.9 mg/dL      AST 27  U/L      ALT 26 U/L      Alkaline Phosphatase 77 U/L      Total Protein 6.4 g/dL      Albumin 3.6 g/dL      Total Bilirubin 0.47 mg/dL      eGFR 110 ml/min/1.73sq m     Narrative:      National Kid[employer] Foundation guidelines for Chronic Kidney Disease (CKD):     Stage 1 with normal or high GFR (GFR > 90 mL/min/1.73 square meters)    Stage 2 Mild CKD (GFR = 60-89 mL/min/1.73 square meters)    Stage 3A Moderate CKD (GFR = 45-59 mL/min/1.73 square meters)    Stage 3B Moderate CKD (GFR = 30-44 mL/min/1.73 square meters)    Stage 4 Severe CKD (GFR = 15-29 mL/min/1.73 square meters)    Stage 5 End Stage CKD (GFR <15 mL/min/1.73 square meters)  Note: GFR calculation is accurate only with a steady state creatinine    CBC and differential [[id number]]  (Abnormal) Collected: 06/09/25 1108    Lab Status: Final result Specimen: Blood from Arm, Left Updated: 06/09/25 1133     WBC 10.23 Thousand/uL      RBC 4.35 Million/uL      Hemoglobin 12.6 g/dL      Hematocrit 38.7 %      MCV 89 fL      MCH 29.0 pg      MCHC 32.6 g/dL      RDW 16.3 %      MPV 10.0 fL      Platelets 245 Thousands/uL      nRBC 0 /100 WBCs      Segmented % 60 %      Immature Grans % 1 %      Lymphocytes % 25 %      Monocytes % 10 %      Eosinophils Relative 3 %      Basophils Relative 1 %      Absolute Neutrophils 6.17 Thousands/µL      Absolute Immature Grans 0.10 Thousand/uL      Absolute Lymphocytes 2.56 Thousands/µL      Absolute Monocytes 1.06 Thousand/µL      Eosinophils Absolute 0.27 Thousand/µL      Basophils Absolute 0.07 Thousands/µL              VAS VENOUS DUPLEX - LOWER LIMB BILATERAL    (Results Pending)       Procedures    ED Medication and Procedure Management   Prior to Admission Medications   Prescriptions Last Dose Informant Patient Reported? Taking?   Aciphex 20 MG tablet  Self Yes No   Sig: Take by mouth as needed   Cholecalciferol (Vitamin D3) 125 MCG (5000 UT) capsule  Self No No   Sig: Take 1 capsule (5,000 Units total) by mouth daily    Invega Trinza 819 MG/2.63ML DWIGHT   Yes No   OXcarbazepine (TRILEPTAL) 150 mg tablet  Self No No   Sig: Take 3 tablets (450 mg total) by mouth every 12 (twelve) hours   albuterol (PROVENTIL HFA,VENTOLIN HFA) 90 mcg/act inhaler   Yes No   Sig: Inhale 2 puffs every 6 (six) hours as needed for wheezing   atoMOXetine (STRATTERA) 60 mg capsule  Self Yes No   Sig: Take 60 mg by mouth in the morning.   atorvastatin (LIPITOR) 80 mg tablet   No No   Sig: Take 1 tablet (80 mg total) by mouth daily   buPROPion (WELLBUTRIN XL) 150 mg 24 hr tablet  Self Yes No   buPROPion (WELLBUTRIN XL) 300 mg 24 hr tablet   Yes No   cefadroxil (DURICEF) 500 mg capsule   No No   Sig: Take 1 capsule (500 mg total) by mouth every 12 (twelve) hours for 6 days   cloNIDine (CATAPRES) 0.3 mg tablet   No No   Sig: Take 1 tablet (0.3 mg total) by mouth 2 (two) times a day   cyanocobalamin (VITAMIN B-12) 1000 MCG tablet  Self No No   Sig: Take 1 tablet (1,000 mcg total) by mouth daily   furosemide (LASIX) 20 mg tablet   No No   Sig: TAKE 1 TABLET BY MOUTH DAILY   levocetirizine (XYZAL) 5 MG tablet  Self No No   Sig: TAKE ONE TABLET BY MOUTH IN THE EVENING DAILY   levothyroxine 125 mcg tablet   No No   Sig: TAKE 1 TABLET BY MOUTH DAILY IN THE EARLY MORNING   losartan (COZAAR) 100 MG tablet   No No   Sig: TAKE 1 TABLET BY MOUTH DAILY   metFORMIN (GLUCOPHAGE-XR) 750 mg 24 hr tablet   No No   Sig: TAKE 1 TABLET BY MOUTH DAILY WITH BREAKFAST   naltrexone (REVIA) 50 mg tablet  Self No No   Sig: TAKE 1 TABLET BY MOUTH DAILY   naproxen (Naprosyn) 500 mg tablet  Self No No   Sig: Take 1 tablet (500 mg total) by mouth 2 (two) times a day with meals Take with food.   oxybutynin (DITROPAN) 5 mg tablet  Self No No   Sig: Take 1 tablet (5 mg total) by mouth 2 (two) times a day   pantoprazole (PROTONIX) 40 mg tablet  Self No No   Sig: Take 1 tablet (40 mg total) by mouth daily in the early morning   pilocarpine (SALAGEN) 5 mg tablet  Self No No   Sig: TAKE 1 TABLET  (5 MG TOTAL) BY MOUTH THREE (THREE) TIMES A DAY   rOPINIRole (REQUIP) 1 mg tablet  Self Yes No   Sig: Take by mouth daily   topiramate (TOPAMAX) 50 MG tablet   No No   Sig: Take 3 tablets (150 mg total) by mouth 2 (two) times a day for 1 day, THEN 2 tablets (100 mg total) 2 (two) times a day for 1 day, THEN 1.5 tablets (75 mg total) 2 (two) times a day for 1 day, THEN 1 tablet (50 mg total) 2 (two) times a day for 1 day, THEN 0.5 tablets (25 mg total) 2 (two) times a day for 1 day, THEN 0.5 tablets (25 mg total) daily for 1 day.   traZODone (DESYREL) 50 mg tablet   No No   Sig: Take 1 tablet (50 mg total) by mouth daily at bedtime as needed for sleep (insomnia)   trospium chloride (SANCTURA) 20 mg tablet  Self Yes No   Sig: Take 20 mg by mouth in the morning and 20 mg in the evening.   warfarin (COUMADIN) 2 mg tablet   No No   Sig: Do not start before June 7, 2025.      Facility-Administered Medications: None     Discharge Medication List as of 6/9/2025 12:58 PM        CONTINUE these medications which have NOT CHANGED    Details   Aciphex 20 MG tablet Take by mouth as needed, Starting Thu 8/8/2024, Historical Med      albuterol (PROVENTIL HFA,VENTOLIN HFA) 90 mcg/act inhaler Inhale 2 puffs every 6 (six) hours as needed for wheezing, Historical Med      atoMOXetine (STRATTERA) 60 mg capsule Take 60 mg by mouth in the morning., Starting Wed 3/6/2024, Historical Med      atorvastatin (LIPITOR) 80 mg tablet Take 1 tablet (80 mg total) by mouth daily, Starting Tue 3/4/2025, Normal      !! buPROPion (WELLBUTRIN XL) 150 mg 24 hr tablet Historical Med      !! buPROPion (WELLBUTRIN XL) 300 mg 24 hr tablet Historical Med      cefadroxil (DURICEF) 500 mg capsule Take 1 capsule (500 mg total) by mouth every 12 (twelve) hours for 6 days, Starting Fri 6/6/2025, Until Thu 6/12/2025, Normal      Cholecalciferol (Vitamin D3) 125 MCG (5000 UT) capsule Take 1 capsule (5,000 Units total) by mouth daily, Starting Thu 4/4/2024, Normal       cloNIDine (CATAPRES) 0.3 mg tablet Take 1 tablet (0.3 mg total) by mouth 2 (two) times a day, Starting Thu 2/20/2025, Normal      cyanocobalamin (VITAMIN B-12) 1000 MCG tablet Take 1 tablet (1,000 mcg total) by mouth daily, Starting Thu 4/4/2024, Until Wed 3/12/2025, Normal      furosemide (LASIX) 20 mg tablet TAKE 1 TABLET BY MOUTH DAILY, Starting Wed 3/5/2025, Normal      Invega Trinza 819 MG/2.63ML DWIGHT Historical Med      levocetirizine (XYZAL) 5 MG tablet TAKE ONE TABLET BY MOUTH IN THE EVENING DAILY, Starting Wed 2/19/2025, Normal      levothyroxine 125 mcg tablet TAKE 1 TABLET BY MOUTH DAILY IN THE EARLY MORNING, Starting Wed 3/5/2025, Normal      losartan (COZAAR) 100 MG tablet TAKE 1 TABLET BY MOUTH DAILY, Starting Wed 3/5/2025, Normal      metFORMIN (GLUCOPHAGE-XR) 750 mg 24 hr tablet TAKE 1 TABLET BY MOUTH DAILY WITH BREAKFAST, Starting Wed 3/5/2025, Normal      naltrexone (REVIA) 50 mg tablet TAKE 1 TABLET BY MOUTH DAILY, Starting Wed 2/19/2025, Normal      naproxen (Naprosyn) 500 mg tablet Take 1 tablet (500 mg total) by mouth 2 (two) times a day with meals Take with food., Starting Thu 10/3/2024, Normal      OXcarbazepine (TRILEPTAL) 150 mg tablet Take 3 tablets (450 mg total) by mouth every 12 (twelve) hours, Starting Tue 1/30/2024, Until Mon 5/5/2025, Normal      oxybutynin (DITROPAN) 5 mg tablet Take 1 tablet (5 mg total) by mouth 2 (two) times a day, Starting Thu 4/4/2024, Until Mon 5/5/2025, Normal      pantoprazole (PROTONIX) 40 mg tablet Take 1 tablet (40 mg total) by mouth daily in the early morning, Starting Thu 4/4/2024, Normal      pilocarpine (SALAGEN) 5 mg tablet TAKE 1 TABLET (5 MG TOTAL) BY MOUTH THREE (THREE) TIMES A DAY, Normal      rOPINIRole (REQUIP) 1 mg tablet Take by mouth daily, Historical Med      topiramate (TOPAMAX) 50 MG tablet Multiple Dosages:Starting Fri 6/6/2025, Until Fri 6/6/2025 at 2359, THEN Starting Sat 6/7/2025, Until Sat 6/7/2025 at 2359, THEN Starting Sun  6/8/2025, Until Sun 6/8/2025 at 2359, THEN Starting Mon 6/9/2025, Until Mon 6/9/2025 at 2359, THEN Starting  Tue 6/10/2025, Until Tue 6/10/2025 at 2359, THEN Starting Wed 6/11/2025, Until Wed 6/11/2025 at 2359Take 3 tablets (150 mg total) by mouth 2 (two) times a day for 1 day, THEN 2 tablets (100 mg total) 2 (two) times a day for 1 day, THEN 1.5 tablets (75  mg total) 2 (two) times a day for 1 day, THEN 1 tablet (50 mg total) 2 (two) times a day for 1 day, THEN 0.5 tablets (25 mg total) 2 (two) times a day for 1 day, THEN 0.5 tablets (25 mg total) daily for 1 day., Normal      traZODone (DESYREL) 50 mg tablet Take 1 tablet (50 mg total) by mouth daily at bedtime as needed for sleep (insomnia), Starting Fri 6/6/2025, Normal      trospium chloride (SANCTURA) 20 mg tablet Take 20 mg by mouth in the morning and 20 mg in the evening., Starting Mon 7/8/2024, Until Tue 7/8/2025, Historical Med      warfarin (COUMADIN) 2 mg tablet Do not start before June 7, 2025., Normal       !! - Potential duplicate medications found. Please discuss with provider.        No discharge procedures on file.  ED SEPSIS DOCUMENTATION   Time reflects when diagnosis was documented in both MDM as applicable and the Disposition within this note       Time User Action Codes Description Comment    6/9/2025 12:53 PM Neo Johnson [R60.0] Bilateral lower extremity edema     6/9/2025 12:53 PM Neo Johnson [L03.90] Cellulitis                    [1]   Past Medical History:  Diagnosis Date    Acute deep vein thrombosis of lower leg, left (HCC) 10/16/2023    Acute heart failure, unspecified heart failure type (HCC) 05/06/2024    Anxiety     Arthritis     oa cassandra knees    Asthma     good control- no medications    Yan's esophagus     Bipolar affective disorder (HCC)     Cannabis abuse with unspecified 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HCC)     Chronic pain disorder     lumbar    Contusion of elbow 12/21/2021    COPD (chronic obstructive pulmonary disease)  "(HCC)     CPAP (continuous positive airway pressure) dependence     DDD (degenerative disc disease), lumbar 04/09/2015    Degenerative disc disease at L5-S1 level     Deliberate self-cutting     9/15/22per pt has not done recently-- does see a therapist and psychiatrist regularly    Depression 09/16/2008    Diarrhea 01/06/2022    Disease of thyroid gland     hypo    MARTINEZ (dyspnea on exertion)     per pt \"has had this with exertion and not new\"    Drug overdose 10/28/2008    suicide attempt and again drug overdose 7/2022--[facility]Medical floor and than transferred to [facility]    Dysphagia     Dyspnea     Ecchymosis 01/06/2022    Edema     BLE    Elevated troponin 09/02/2023    Family history of blood clots     GERD (gastroesophageal reflux disease)     Grief 11/11/2023    Headache(784.0)     Headache, tension-type     Hemorrhage of rectum and anus 10/02/2017    Formatting of this note might be different from the original.  Added automatically from request for surgery [id number]    High blood pressure     History of COVID-19 12/30/2020    Symptoms started on 12/30/2020 and then got worse.  Today she is feeling a little bit better.  She is a candidate for monoclonal antibody infusion and set for 1/6/21 @ 1pm at [facility]. 01/07/21 - telemedicine -     History of kidney stones     Hyperlipidemia     Hypertension     Ingrown toenail 12/02/2023    Intentional overdose ([facility]) 09/27/2023    Intentional self-harm by unspecified sharp object, sequela ([facility]) 02/09/2023    Irritable bowel syndrome     Knee pain, bilateral     Especially right    Knee pain, right 02/23/2022    Medial epicondylitis of elbow, left 04/03/2018    Formatting of this note might be different from the original.  Added automatically from request for surgery [id number]    Medial epicondylitis of right elbow 07/17/2023    Medical marijuana use     Memory difficulties 08/06/2020    Memory loss     Migraines     Muscle weakness     legs    Obesity     Other " psychoactive substance abuse with unspecified psychoactive substance-induced disorder (HCC) 05/06/2024    Overactive bladder     Polysubstance abuse (HCC) 09/29/2023    Potential for cognitive impairment 06/17/2021    Primary osteoarthritis of both knees 08/08/2018    Pulmonary emboli (HCC)     Restrictive lung disease 02/01/2017    Last Assessment & Plan:   Formatting of this note might be different from the original.  I reviewed this problem throughout the rest of the note. Please refer to the other assessments for details.    Risk for falls     Sjogren's disease (HCC)     Sleep apnea     Stress incontinence     Suicidal deliberate poisoning (HCC) 07/13/2022    Suicidal ideations     Teeth missing     Toxic encephalopathy 11/08/2023    Tremor     per pt Tremors of Right Leg and both Arms    Visual impairment     Wears glasses    [2]   Past Surgical History:  Procedure Laterality Date    BREAST CYST EXCISION Right 1989    CARPAL TUNNEL RELEASE Left     CHOLECYSTECTOMY  05/2003    Laparoscopic    COLONOSCOPY      01/12/2009    DILATION AND CURETTAGE OF UTERUS      ELBOW SURGERY Left     x2. No hardware    ESOPHAGOGASTRODUODENOSCOPY      FOOT SURGERY Left     Plantar fasciotomy    HERNIA REPAIR      HYSTERECTOMY      laporoscopic, partial    KNEE ARTHROSCOPY Bilateral     OOPHORECTOMY Left 10/2015    GA CORRJ HLX VLGS BNCTY [facility] JOINT ARTHRODESIS Right 8/2/2024    Procedure: RIGHT FOOT LAPIDUS BUNIONECTOMY, 2ND HAMMERTOE REPAIR, SECOND METATARSAL OSTEOTOMY WITH SECOND PLANTAR PLATE REPAIR;  Surgeon: Kaz Bautista DPM;  Location:  MAIN OR;  Service: Podiatry    GA GASTROCNEMIUS RECESSION Left 02/24/2021    Procedure: RECESSION GASTROC OPEN;  Surgeon: Nomi Yang DPM;  Location:  MAIN OR;  Service: Podiatry    GA REMOVAL IMPLANT DEEP Right 5/14/2025    Procedure: FOOT REMOVAL OF PAINFUL HARDWARE, REVISION OF SCAR;  Surgeon: Kaz Bautista DPM;  Location:  MAIN OR;  Service: Podiatry     NY RPR UMBILICAL HRNA 5 YRS/> REDUCIBLE N/A 2019    Procedure: REPAIR HERNIA UMBILICAL LAPAROSCOPIC W/ ROBOTICS;  Surgeon: Anahi Colon MD;  Location: [facility] Main OR;  Service: General    NY SPHINCTEROTOMY ANAL DIVISION SPHINCTER SPX N/A 2018    Procedure: EUA, LEFT PARTIAL INTERNAL SPHINCTEROTOMY;  Surgeon: Tien Reyes MD;  Location: [facility] MAIN OR;  Service: Colorectal    NY TNOT ELBOW LATERAL/MEDIAL DEBRIDE OPEN TDN RPR Left 2022    Procedure: RELEASE EPICONDYLAR ELBOW MEDIAL;  Surgeon: Kyree Winkler MD;  [facility];  Service: Orthopedics    REDUCTION MAMMAPLASTY Bilateral 1999    REPAIR LACERATION Left     left hand-2009    REPLACEMENT TOTAL KNEE Right     ROTATOR CUFF REPAIR Left     TONSILECTOMY AND ADNOIDECTOMY      US GUIDED MSK PROCEDURE  2021    VASCULAR SURGERY      VEIN LIGATION Bilateral     WISDOM TOOTH EXTRACTION     [3]   Family History  Problem Relation Name Age of Onset    Kidney cancer Mother anastacio     Diabetes Mother anastacio     Depression Mother anastacio     Stroke Mother anastacio     Arthritis Mother anastacio     Cancer Mother anastacio     Psychiatric Illness Mother anastacio     No Known Problems Father      No Known Problems Sister      No Known Problems Maternal Grandmother      No Known Problems Maternal Grandfather      No Known Problems Paternal Grandmother      No Known Problems Paternal Grandfather      Colon cancer Maternal Uncle      Colon cancer Maternal Uncle      Colon cancer Paternal Uncle      Colon cancer Family fx     Alcohol abuse Sister bharti     Asthma Sister bharti    [4]   Social History  Tobacco Use    Smoking status: Former     Current packs/day: 0.00     Average packs/day: 2.0 packs/day for 33.0 years (66.0 ttl pk-yrs)     Types: Cigarettes     Start date: 1985     Quit date: 2018     Years since quittin.4    Smokeless tobacco: Never    Tobacco comments:     Started at age 16.   Vaping Use    Vaping status: Some Days     Substances: THC   Substance Use Topics    Alcohol use: Not Currently     Comment: Recovering alcoholic    Drug use: Yes     Types: Marijuana     Comment: Former meth use, medicinal marijuana(medical card)        Neo Johnson MD  06/09/25 [id number]

## 2025-06-09 NOTE — ED ATTENDING ATTESTATION
6/9/2025  I, Milka Henson MD, saw and evaluated the patient. I have discussed the patient with the resident/non-physician practitioner and agree with the resident's/non-physician practitioner's findings, Plan of Care, and MDM as documented in the resident's/non-physician practitioner's note, except where noted. All available labs and Radiology studies were reviewed.  I was present for key portions of any procedure(s) performed by the resident/non-physician practitioner and I was immediately available to provide assistance.       At this point I agree with the current assessment done in the Emergency Department.  I have conducted an independent evaluation of this patient a history and physical is as follows:    54-year-old female with a history of hypertension, hyperlipidemia, COPD, lower extremity edema, DVT presenting to the emergency department for evaluation of leg swelling.  Patient was recently admitted for confusion and concern for sepsis secondary to lower extremity cellulitis.  Was supposed to be taking antibiotics upon discharge but patient states she did not pick them up.  Patient presented to [facility] emergency department this morning for evaluation of leg swelling; I have reviewed this workup.  Patient was discharged home and told to take her antibiotics.  Patient then saw her podiatrist who was concerned for infection and sent different antibiotics to her pharmacy, which patient states have been filled but she needs to pick them up.  She is not sure what antibiotic this is.  However, patient is concerned about a DVT; she is currently on warfarin but her INR was subtherapeutic this morning.  Denies fever, chills, upper respiratory symptoms, chest pain, shortness of breath, nausea, vomiting, abdominal pain.    Please see resident documentation for histories review of systems.    Exam: Vital signs and nursing notes reviewed  General: Awake, alert, no acute distress  HEENT:  Normocephalic, atraumatic, mucous membranes moist  Neck: Supple  Heart: Regular rate and rhythm  Lungs: Clear to auscultation bilaterally without wheezes, rales, or rhonchi  Abdomen: Soft, nontender, nondistended, no rebound or guarding  Extremities: Bilateral lower extremity nonpitting edema below the knee to the ankle which shininess and mild erythema with tenderness to palpation; no calf asymmetry or tenderness, mild tenderness over the deep venous system on the left.  Good distal pulses and capillary refill  Skin: As above  Neuro: No gross motor deficits    ED Course  ED Course as of 25 1732      1203 WBC(!): 10.23   1203 Hemoglobin: 12.6   1203 Platelet Count: 245   1203 Labs obtained in triage via first nurse protocol   1204 Comprehensive metabolic panel(!)  Grossly normal   1212 BNP: 33   1240 Per vascular tech: negative for DVT bilaterally     ED course/Medical Decision Makin-year-old female presenting for evaluation of lower extremity swelling.  Differential diagnosis includes heart failure, renal failure, DVT, cellulitis, compartment syndrome, peripheral edema.  Laboratory studies were obtained in triage show mild leukocytosis but no anemia with a grossly normal CBC and BNP.  There is mild erythema to suggest mild cellulitis but no fever or systemic symptoms.  Patient has antibiotics prescribed to her and will pick them up later today; feel this is appropriate to continue this course of treatment.  No evidence of compartment syndrome on exam.  Low suspicion for heart or renal failure.  Bilateral lower extremity venous duplexes were obtained to evaluate for DVT, which were negative bilaterally per the vascular tech.  Suspect peripheral edema with mild cellulitis as the cause of the patient's symptoms.  Given a dose of her Lasix here as she did not take it yet today.  Patient counseled on supportive measures at home including compression stockings and elevation.  Return precautions  for worsening pain, swelling, redness, fever, vomiting, or any new symptoms discussed.  Patient is in agreement and understanding of these instructions.    Diagnosis: Lower extremity peripheral edema, cellulitis  Disposition: Discharge

## 2025-06-09 NOTE — UTILIZATION REVIEW
NOTIFICATION OF ADMISSION DISCHARGE   This is a Notification of Discharge from [facility]. Please be advised that this patient has been discharge from our facility. Below you will find the admission and discharge date and time including the patient’s disposition.   UTILIZATION REVIEW CONTACT:  Utilization Review Assistants  Network Utilization Review Department  Phone: [phone] x carefully listen to the prompts. All voicemails are confidential.  Email: [email or web address]     ADMISSION INFORMATION  PRESENTATION DATE: 6/4/2025  3:56 PM  OBERVATION ADMISSION DATE: N/A  INPATIENT ADMISSION DATE: 6/4/25  8:00 PM   DISCHARGE DATE: 6/6/2025  3:23 PM   DISPOSITION:Home/Self Care    Network Utilization Review Department  ATTENTION: Please call with any questions or concerns to [phone] and carefully listen to the prompts so that you are directed to the right person. All voicemails are confidential.   For Discharge needs, contact Care Management DC Support Team at [phone] opt. 2  Send all requests for admission clinical reviews, approved or denied determinations and any other requests to dedicated fax number below belonging to the campus where the patient is receiving treatment. List of dedicated fax numbers for the Facilities:  FACILITY NAME UR FAX NUMBER   ADMISSION DENIALS (Administrative/Medical Necessity) [phone]   DISCHARGE SUPPORT TEAM ([facility]) [phone]   PARENT CHILD HEALTH (Maternity/NICU/Pediatrics) [phone]   [facility] [phone]   [facility] [phone]   [facility] [phone]   [facility] [phone]   [facility] [phone]   [facility] [phone]   [facility] [phone]   [facility] [phone]   [facility]  [facility] [phone]   [facility] [phone]   [facility] [phone]   [facility] [phone]

## 2025-06-11 LAB
ATRIAL RATE: 97 BPM
P AXIS: -19 DEGREES
PR INTERVAL: 200 MS
QRS AXIS: -20 DEGREES
QRSD INTERVAL: 104 MS
QT INTERVAL: 352 MS
QTC INTERVAL: 447 MS
T WAVE AXIS: 44 DEGREES
VENTRICULAR RATE: 97 BPM

## 2025-06-11 PROCEDURE — 93010 ELECTROCARDIOGRAM REPORT: CPT | Performed by: INTERNAL MEDICINE

## 2025-06-11 NOTE — QUICK NOTE
Patient presented with toxic metabolic encephalopathy secondary to lower extremity cellulitis as well as polypharmacy.  Patient is on extensive psychiatric regimen, psychiatry was consulted and patient.  With discussions with psychiatry it was agreed upon to taper her Topamax until discontinuation.  This was discussed with the patient on the day of discharge and she verbalized understanding of the taper and she will follow-up with her psychiatrist outpatient.  Patient is now back to her baseline mental status following treatment for her lower extremity cellulitis with antibiotics.

## 2025-06-12 ENCOUNTER — TELEPHONE (OUTPATIENT)
Dept: HEMATOLOGY ONCOLOGY | Facility: CLINIC | Age: 55
End: 2025-06-12

## 2025-06-12 ENCOUNTER — APPOINTMENT (OUTPATIENT)
Dept: LAB | Facility: CLINIC | Age: 55
End: 2025-06-12
Payer: COMMERCIAL

## 2025-06-12 DIAGNOSIS — Z79.01 ANTICOAGULATION MANAGEMENT ENCOUNTER: ICD-10-CM

## 2025-06-12 DIAGNOSIS — Z86.718 HISTORY OF DVT (DEEP VEIN THROMBOSIS): ICD-10-CM

## 2025-06-12 DIAGNOSIS — E87.6 HYPOKALEMIA: ICD-10-CM

## 2025-06-12 DIAGNOSIS — R41.0 CONFUSION: ICD-10-CM

## 2025-06-12 DIAGNOSIS — Z51.81 ANTICOAGULATION MANAGEMENT ENCOUNTER: ICD-10-CM

## 2025-06-12 DIAGNOSIS — Z86.711 HISTORY OF PULMONARY EMBOLISM: Primary | ICD-10-CM

## 2025-06-12 LAB
INR PPP: 3.2 (ref 0.85–1.19)
PROTHROMBIN TIME: 32.4 SECONDS (ref 12.3–15)

## 2025-06-12 PROCEDURE — 85610 PROTHROMBIN TIME: CPT

## 2025-06-12 PROCEDURE — 36415 COLL VENOUS BLD VENIPUNCTURE: CPT

## 2025-06-12 NOTE — TELEPHONE ENCOUNTER
Spoke with patient to review her INR of 3.2.  It slightly supratherapeutic therefore, recommend she hold her Coumadin today and resume with her regular dose Friday.  We will recheck her INR next week.  I will also call her on Monday after I speak with her pharmacist to switch her over to Eliquis because she is no longer on Depakote.  The only reason she was on Coumadin it was because of Depakote had a high interaction with Eliquis.  I have to speak with her pharmacist to see if it will be covered.  In addition, we need her INR to be below 2 in order to start her on Eliquis.  Patient voiced understanding.

## 2025-06-12 NOTE — TELEPHONE ENCOUNTER
Spoke with pharmacist at [facility] pharmacy.  Eliquis is covered for patient.  On Monday we will have her start holding her Coumadin.  We will repeat INR on Wednesday and if it is below 2, recommend she start taking Eliquis 5 mg twice daily.  I will call her on Monday so patient is not overwhelmed with instructions this early.

## 2025-06-16 NOTE — TELEPHONE ENCOUNTER
Woke with Blanca to see if she can come in on Thursday so we can discuss switching her from Coumadin to Eliquis.  I would like patient to come in to give her instructions written down as she gets confused when I give her too much information over the phone.  She is agreeable with this plan.  I will see her on Thursday.

## 2025-06-17 PROBLEM — R07.89 OTHER CHEST PAIN: Status: ACTIVE | Noted: 2025-06-17

## 2025-06-17 PROBLEM — I95.1 ORTHOSTASIS: Status: ACTIVE | Noted: 2025-06-17

## 2025-06-18 ENCOUNTER — APPOINTMENT (OUTPATIENT)
Dept: LAB | Facility: CLINIC | Age: 55
End: 2025-06-18
Payer: COMMERCIAL

## 2025-06-18 ENCOUNTER — TELEPHONE (OUTPATIENT)
Dept: OTHER | Facility: HOSPITAL | Age: 55
End: 2025-06-18

## 2025-06-18 ENCOUNTER — OFFICE VISIT (OUTPATIENT)
Dept: FAMILY MEDICINE CLINIC | Facility: CLINIC | Age: 55
End: 2025-06-18
Payer: COMMERCIAL

## 2025-06-18 ENCOUNTER — TELEPHONE (OUTPATIENT)
Dept: OTHER | Facility: OTHER | Age: 55
End: 2025-06-18

## 2025-06-18 ENCOUNTER — RESULTS FOLLOW-UP (OUTPATIENT)
Dept: OTHER | Facility: HOSPITAL | Age: 55
End: 2025-06-18

## 2025-06-18 ENCOUNTER — HOSPITAL ENCOUNTER (EMERGENCY)
Facility: HOSPITAL | Age: 55
Discharge: HOME | End: 2025-06-19
Attending: EMERGENCY MEDICINE
Payer: COMMERCIAL

## 2025-06-18 VITALS
OXYGEN SATURATION: 97 % | SYSTOLIC BLOOD PRESSURE: 98 MMHG | DIASTOLIC BLOOD PRESSURE: 70 MMHG | WEIGHT: 293 LBS | BODY MASS INDEX: 47.09 KG/M2 | TEMPERATURE: 97.6 F | HEIGHT: 66 IN | HEART RATE: 94 BPM

## 2025-06-18 VITALS
RESPIRATION RATE: 18 BRPM | HEART RATE: 111 BPM | DIASTOLIC BLOOD PRESSURE: 57 MMHG | OXYGEN SATURATION: 94 % | SYSTOLIC BLOOD PRESSURE: 121 MMHG | TEMPERATURE: 98.1 F

## 2025-06-18 DIAGNOSIS — Z86.718 HISTORY OF DVT (DEEP VEIN THROMBOSIS): ICD-10-CM

## 2025-06-18 DIAGNOSIS — R41.82 ALTERED MENTAL STATUS, UNSPECIFIED ALTERED MENTAL STATUS TYPE: ICD-10-CM

## 2025-06-18 DIAGNOSIS — I10 BENIGN ESSENTIAL HYPERTENSION: Chronic | ICD-10-CM

## 2025-06-18 DIAGNOSIS — R79.1 SUPRATHERAPEUTIC INR: Primary | ICD-10-CM

## 2025-06-18 DIAGNOSIS — R60.0 BILATERAL LEG EDEMA: ICD-10-CM

## 2025-06-18 DIAGNOSIS — Z92.89 HOSPITALIZATION WITHIN LAST 30 DAYS: Primary | ICD-10-CM

## 2025-06-18 DIAGNOSIS — R79.1 ELEVATED INR (INTERNATIONAL NORMALIZED RATIO) DUE TO PRIOR ANTICOAGULANT MEDICATION INGESTION: Primary | ICD-10-CM

## 2025-06-18 LAB
ALBUMIN SERPL BCG-MCNC: 3.6 G/DL (ref 3.5–5)
ALP SERPL-CCNC: 77 U/L (ref 34–104)
ALT SERPL W P-5'-P-CCNC: 17 U/L (ref 7–52)
ANION GAP SERPL CALCULATED.3IONS-SCNC: 11 MMOL/L (ref 4–13)
AST SERPL W P-5'-P-CCNC: 20 U/L (ref 13–39)
BASOPHILS # BLD AUTO: 0.06 THOUSANDS/ÂΜL (ref 0–0.1)
BASOPHILS NFR BLD AUTO: 1 % (ref 0–1)
BILIRUB SERPL-MCNC: 0.31 MG/DL (ref 0.2–1)
BUN SERPL-MCNC: 6 MG/DL (ref 5–25)
CALCIUM SERPL-MCNC: 9.1 MG/DL (ref 8.4–10.2)
CHLORIDE SERPL-SCNC: 105 MMOL/L (ref 96–108)
CO2 SERPL-SCNC: 25 MMOL/L (ref 21–32)
CREAT SERPL-MCNC: 0.63 MG/DL (ref 0.6–1.3)
EOSINOPHIL # BLD AUTO: 0.1 THOUSAND/ÂΜL (ref 0–0.61)
EOSINOPHIL NFR BLD AUTO: 1 % (ref 0–6)
ERYTHROCYTE [DISTWIDTH] IN BLOOD BY AUTOMATED COUNT: 16.8 % (ref 11.6–15.1)
GFR SERPL CREATININE-BSD FRML MDRD: 102 ML/MIN/1.73SQ M
GLUCOSE SERPL-MCNC: 102 MG/DL (ref 65–140)
HCT VFR BLD AUTO: 42.5 % (ref 34.8–46.1)
HGB BLD-MCNC: 13.3 G/DL (ref 11.5–15.4)
IMM GRANULOCYTES # BLD AUTO: 0.07 THOUSAND/UL (ref 0–0.2)
IMM GRANULOCYTES NFR BLD AUTO: 1 % (ref 0–2)
INR PPP: 8.1 (ref 0.85–1.19)
LYMPHOCYTES # BLD AUTO: 2.26 THOUSANDS/ÂΜL (ref 0.6–4.47)
LYMPHOCYTES NFR BLD AUTO: 24 % (ref 14–44)
MCH RBC QN AUTO: 28.6 PG (ref 26.8–34.3)
MCHC RBC AUTO-ENTMCNC: 31.3 G/DL (ref 31.4–37.4)
MCV RBC AUTO: 91 FL (ref 82–98)
MONOCYTES # BLD AUTO: 0.84 THOUSAND/ÂΜL (ref 0.17–1.22)
MONOCYTES NFR BLD AUTO: 9 % (ref 4–12)
NEUTROPHILS # BLD AUTO: 5.93 THOUSANDS/ÂΜL (ref 1.85–7.62)
NEUTS SEG NFR BLD AUTO: 64 % (ref 43–75)
NRBC BLD AUTO-RTO: 0 /100 WBCS
PLATELET # BLD AUTO: 171 THOUSANDS/UL (ref 149–390)
PMV BLD AUTO: 11.1 FL (ref 8.9–12.7)
POTASSIUM SERPL-SCNC: 3.9 MMOL/L (ref 3.5–5.3)
PROT SERPL-MCNC: 6.4 G/DL (ref 6.4–8.4)
PROTHROMBIN TIME: 65.1 SECONDS (ref 12.3–15)
RBC # BLD AUTO: 4.65 MILLION/UL (ref 3.81–5.12)
SODIUM SERPL-SCNC: 141 MMOL/L (ref 135–147)
WBC # BLD AUTO: 9.26 THOUSAND/UL (ref 4.31–10.16)

## 2025-06-18 PROCEDURE — 85025 COMPLETE CBC W/AUTO DIFF WBC: CPT

## 2025-06-18 PROCEDURE — 85610 PROTHROMBIN TIME: CPT | Performed by: EMERGENCY MEDICINE

## 2025-06-18 PROCEDURE — 99496 TRANSJ CARE MGMT HIGH F2F 7D: CPT

## 2025-06-18 PROCEDURE — 99283 EMERGENCY DEPT VISIT LOW MDM: CPT

## 2025-06-18 PROCEDURE — 80053 COMPREHEN METABOLIC PANEL: CPT

## 2025-06-18 PROCEDURE — 99284 EMERGENCY DEPT VISIT MOD MDM: CPT | Performed by: EMERGENCY MEDICINE

## 2025-06-18 PROCEDURE — 85610 PROTHROMBIN TIME: CPT

## 2025-06-18 PROCEDURE — 85730 THROMBOPLASTIN TIME PARTIAL: CPT | Performed by: EMERGENCY MEDICINE

## 2025-06-18 PROCEDURE — 36415 COLL VENOUS BLD VENIPUNCTURE: CPT

## 2025-06-18 RX ORDER — PHYTONADIONE 5 MG/1
5 TABLET ORAL ONCE
Status: COMPLETED | OUTPATIENT
Start: 2025-06-18 | End: 2025-06-18

## 2025-06-18 RX ADMIN — PHYTONADIONE 5 MG: 5 TABLET ORAL at 23:16

## 2025-06-18 NOTE — ASSESSMENT & PLAN NOTE
Patient reports confusion for approximately 2 months noticed by herself friends and family. She  reports deficits in short-term memory and executive functioning.    Most likely cause of her symptoms is some combination of medication side effects given her extensive medication list and her polypharmacy.    CT head clear, ABG + lactic acidosis, but lactic acid resolved. Thyroid function/B12 normal. UA/UDS + nitrites/THC.   Per Dr. Quigley w/ psych, topiramate is most likely pharmacologic factor contributing to confusion, will wean per psych recs until off within the upcoming week.   Continue with Invega SNOWDEN per psych  Schedule MRI brain in outpatient setting.

## 2025-06-18 NOTE — ASSESSMENT & PLAN NOTE
Maintained on warfarin with goal INR 2-3, was briefly on Lovenox injections before and after right foot/ankle orthopedic surgery.    Follows with [facility] heme/onc for anticoagulation management.  Outpt INR 6/9, f/u with PCP or heme.

## 2025-06-18 NOTE — PROGRESS NOTES
Transition of Care Visit:  Name: Blanca Mason      : 1970      MRN: [id number]  Encounter Provider: JHONATAN Armando  Encounter Date: 2025   Encounter department: [facility]    Assessment & Plan  Hospitalization within last 30 days         Altered mental status, unspecified altered mental status type  Patient reports confusion for approximately 2 months noticed by herself friends and family. She  reports deficits in short-term memory and executive functioning.    Most likely cause of her symptoms is some combination of medication side effects given her extensive medication list and her polypharmacy.    CT head clear, ABG + lactic acidosis, but lactic acid resolved. Thyroid function/B12 normal. UA/UDS + nitrites/THC.   Per Dr. Quigley w/ psych, topiramate is most likely pharmacologic factor contributing to confusion, will wean per psych recs until off within the upcoming week.   Continue with Invega SNOWDEN per psych  Schedule MRI brain in outpatient setting.          Benign essential hypertension  Today's BP 98/70  goal bp 140/90, pt's bp is at goal  Current medication: Losartan 100mg, Lasix 20mg, Clonidine 0.3mg BID   Continue current medication regimen   Advised patient on symptoms of hypotension & severe HTN  Limit salt-intake & caffeine. DASH diet discussed, minimize stress level  Weight reduction efforts via improved diet & increased exercise              Bilateral leg edema  No signs of cellulitis or active infection.    Negative lower extremity Doppler this admission.    Continue home Lasix.  Likely venous stasis         History of DVT (deep vein thrombosis)  Maintained on warfarin with goal INR 2-3, was briefly on Lovenox injections before and after right foot/ankle orthopedic surgery.    Follows with [facility]c for anticoagulation management.  Outpt INR 6/9, f/u with PCP or heme.              History of Present Illness     Transitional Care Management  Review:   Blanca Mason is a 54 y.o. female here for TCM follow up.     During the TCM phone call patient stated:  TCM Call (since 6/4/2025)       Date and time call was made  6/6/2025  3:31 PM    Hospital care reviewed  Records reviewed    Date of Admission  06/04/25    Date of discharge  06/06/25    Diagnosis  Altered mental status    Disposition  Home    Were the patients medications reviewed and updated  Yes    Current Symptoms  None          TCM Call (since 6/4/2025)       Post hospital issues  None    Scheduled for follow up?  Yes    Did you obtain your prescribed medications  Yes    Do you need help managing your prescriptions or medications  No    Is transportation to your appointment needed  No    I have advised the patient to call PCP with any new or worsening symptoms  zackary laguerre ma    Living Arrangements  Alone    Support System  None    Are you recieving home care services  No          6/4/2025 - 6/6/2025   Primary diagnosis: Altered mental status     CT head, TFTs, B12, UA, UDS did not explain presentation. Likely polypharmacy / adverse effect of medication. Meds were adjusted and weaned per rec from Dr. Quigley in Psychiatry.      Patient reports confusion for approximately 2 months noticed by herself friends and family.  Her friends described as how she normally acts when she smokes marijuana but patient reports not smoking much marijuana recently.  She also reports deficits in short-term memory and executive functioning.  States she has been having some gait difficulties with possible increase in urinary frequency.     CTA PE did not demonstrate large PE.        Review of Systems   Constitutional:  Negative for activity change, chills, fatigue and fever.   HENT:  Negative for congestion, ear pain, rhinorrhea, sore throat and trouble swallowing.    Eyes:  Negative for pain and visual disturbance.   Respiratory:  Negative for cough, chest tightness and shortness of breath.    Cardiovascular:   "Negative for chest pain, palpitations and leg swelling.   Gastrointestinal:  Negative for abdominal pain, constipation, diarrhea, nausea and vomiting.   Genitourinary:  Negative for difficulty urinating, dysuria, hematuria and urgency.   Musculoskeletal:  Negative for arthralgias and back pain.   Skin:  Negative for color change and rash.   Neurological:  Negative for dizziness, seizures, syncope and headaches.   Psychiatric/Behavioral:  Negative for dysphoric mood. The patient is not nervous/anxious.    All other systems reviewed and are negative.    Objective   BP 98/70 (BP Location: Left arm, Patient Position: Sitting, Cuff Size: Large)   Pulse 94   Temp 97.6 °F (36.4 °C) (Temporal)   Ht 5' 6\" (1.676 m)   Wt 134 kg (296 lb 6.4 oz)   SpO2 97%   BMI 47.84 kg/m²     Physical Exam  Vitals and nursing note reviewed.   Constitutional:       General: She is not in acute distress.     Appearance: Normal appearance. She is well-developed and normal weight.   HENT:      Head: Normocephalic and atraumatic.      Right Ear: Tympanic membrane, ear canal and external ear normal. There is no impacted cerumen.      Left Ear: Tympanic membrane, ear canal and external ear normal. There is no impacted cerumen.      Nose: Nose normal.      Mouth/Throat:      Mouth: Mucous membranes are moist.      Pharynx: Oropharynx is clear.     Eyes:      Extraocular Movements: Extraocular movements intact.      Conjunctiva/sclera: Conjunctivae normal.      Pupils: Pupils are equal, round, and reactive to light.       Cardiovascular:      Rate and Rhythm: Normal rate and regular rhythm.      Pulses: Normal pulses.      Heart sounds: Normal heart sounds. No murmur heard.  Pulmonary:      Effort: Pulmonary effort is normal. No respiratory distress.      Breath sounds: Normal breath sounds.   Abdominal:      General: Bowel sounds are normal.      Palpations: Abdomen is soft.      Tenderness: There is no abdominal tenderness. "     Musculoskeletal:         General: Normal range of motion.      Cervical back: Normal range of motion and neck supple.      Right lower leg: No edema.      Left lower leg: No edema.     Skin:     General: Skin is warm and dry.      Capillary Refill: Capillary refill takes less than 2 seconds.     Neurological:      General: No focal deficit present.      Mental Status: She is alert and oriented to person, place, and time. Mental status is at baseline.     Psychiatric:         Mood and Affect: Mood normal.         Behavior: Behavior normal.         Thought Content: Thought content normal.         Judgment: Judgment normal.       Medications have been reviewed by provider in current encounter    Administrative Statements   I have spent a total time of 40 minutes in caring for this patient on the day of the visit/encounter including Diagnostic results, Prognosis, Risks and benefits of tx options, Instructions for management, Patient and family education, Importance of tx compliance, Risk factor reductions, Impressions, Counseling / Coordination of care, Documenting in the medical record, Reviewing/placing orders in the medical record (including tests, medications, and/or procedures), and Obtaining or reviewing history  .

## 2025-06-18 NOTE — ASSESSMENT & PLAN NOTE
Today's BP 98/70  goal bp 140/90, pt's bp is at goal  Current medication: Losartan 100mg, Lasix 20mg, Clonidine 0.3mg BID   Continue current medication regimen   Advised patient on symptoms of hypotension & severe HTN  Limit salt-intake & caffeine. DASH diet discussed, minimize stress level  Weight reduction efforts via improved diet & increased exercise

## 2025-06-18 NOTE — ASSESSMENT & PLAN NOTE
No signs of cellulitis or active infection.    Negative lower extremity Doppler this admission.    Continue home Lasix.  Likely venous stasis

## 2025-06-19 ENCOUNTER — TELEPHONE (OUTPATIENT)
Age: 55
End: 2025-06-19

## 2025-06-19 ENCOUNTER — OFFICE VISIT (OUTPATIENT)
Dept: HEMATOLOGY ONCOLOGY | Facility: CLINIC | Age: 55
End: 2025-06-19
Payer: COMMERCIAL

## 2025-06-19 VITALS
RESPIRATION RATE: 16 BRPM | HEIGHT: 66 IN | SYSTOLIC BLOOD PRESSURE: 122 MMHG | BODY MASS INDEX: 47.09 KG/M2 | TEMPERATURE: 97.6 F | DIASTOLIC BLOOD PRESSURE: 82 MMHG | HEART RATE: 100 BPM | OXYGEN SATURATION: 97 % | WEIGHT: 293 LBS

## 2025-06-19 DIAGNOSIS — Z51.81 ANTICOAGULATION MANAGEMENT ENCOUNTER: ICD-10-CM

## 2025-06-19 DIAGNOSIS — Z86.711 HISTORY OF PULMONARY EMBOLISM: Primary | ICD-10-CM

## 2025-06-19 DIAGNOSIS — Z86.718 HISTORY OF DVT (DEEP VEIN THROMBOSIS): ICD-10-CM

## 2025-06-19 DIAGNOSIS — Z79.01 ANTICOAGULATION MANAGEMENT ENCOUNTER: ICD-10-CM

## 2025-06-19 LAB
APTT PPP: 100 SECONDS (ref 23–34)
INR PPP: 6.73 (ref 0.85–1.19)
PROTHROMBIN TIME: 58.3 SECONDS (ref 12.3–15)

## 2025-06-19 PROCEDURE — 99213 OFFICE O/P EST LOW 20 MIN: CPT

## 2025-06-19 NOTE — DISCHARGE INSTRUCTIONS
Your INR level is improving.  Do not resume warfarin until after speaking with your hematologist at your appointment tomorrow.

## 2025-06-19 NOTE — TELEPHONE ENCOUNTER
Patient wanted to let Heather know that her lab was closed at 1pm so she will go first thing in the morning tomorrow,

## 2025-06-19 NOTE — TELEPHONE ENCOUNTER
Telephone Encounter: Medical Oncology:  Blanca Mason 54 y.o. female MRN: [id number]  Unit/Bed#:  Encounter: [id number]    Telephone Encounter:  Telephone Query:  Description:    54-year-old female with history of of DVT on Coumadin chronically 6 mg with goal INR between 2 and 3, with lab draw from 6/18 being alerted critical lab value of 8.1.  Patient was contacted denies any active signs of bleeding.  However she has been on cefadroxil antibiotic which is the likely cause of supratherapeutic INR.    Discussed with patient that concern for INR is going to continue to rise while being on antibiotic.    She is agreeable to being evaluated in the ED for monitoring.    Recommendations:  Continue to hold warfarin  Repeat INR in a.m.  If repeat INR is greater than 10 would give vitamin K 2.5 mg p.o.  Price check Eliquis if patient is able to afford should be transitioned off of warfarin to DOAC    Daniel Henderson D.O.  Hematology-Oncology Fellow (PGY-4)

## 2025-06-19 NOTE — TELEPHONE ENCOUNTER
Lab Result: PT INR 8.10    Date/Time Drawn: 6/18/25 11:26 AM   Ordering Provider: Karuna Saenz DO    Lab Personnel's Name: Eli       Read back to the lab as documented above     [x]     Secure Chat message sent to on-call provider  [x]     Provider confirmed receipt of message     [x]

## 2025-06-19 NOTE — PROGRESS NOTES
Name: Blanca Mason      : 1970      MRN: [id number]  Encounter Provider: JHONATAN Lee  Encounter Date: 2025   Encounter department: [facility] [place]  :  Assessment & Plan  History of pulmonary embolism    Orders:    Protime-INR; Future    History of DVT (deep vein thrombosis)    Orders:    Protime-INR; Future    Anticoagulation management encounter         54-year-old female with history of saddle PE and left lower extremity DVT.  Our plan is to transition patient off of Coumadin and on Xarelto 20 mg daily as she is no longer on Depakote, which had a interaction with her anticoagulation.  The reason for choosing Xarelto is due to compliance issues.  We discussed that her INR has to be below 2 in order for her to start a DOAC.  I called patient's pharmacy while in the office visit and Xarelto was covered with no co-pay.    I wrote down instructions and made sure patient was able to read them.  She will go to the hospital to get an INR today after the office visit due to her supratherapeutic INR.  She will stop Coumadin altogether.  Patient will repeat her INR on Monday at 7 AM.  I will call her with instructions.  If her INR is below 2, I will prescribe Xarelto 20 mg daily for her to start.  Since patient is caitlin require anticoagulation indefinitely, when she is transition to a DOAC, recommend management by PCP.  Patient is agreeable with this plan.  I will reach out to her PCP once she has been transitioned.    Recommend patient not take any random antibiotics without speaking with her healthcare provider.  I reiterated that she is not to take Coumadin at all going forward.  She can take the medication to her pharmacy for disposal.    Patient understands the plan.  I asked her to call our office should she have any questions or concerns.    No follow-ups on file.    History of Present Illness   Chief Complaint   Patient presents with    Follow-up      Pertinent Medical History   05/02/25: Patient presents to the office for Coumadin management for saddle PE on 9/1/2023 and left lower extremity DVT. Patient has a PMH significant for suicidal ideations and attempts, major depressive disorder, hypothyroidism, Yan's esophagus, GERD, COPD, hypertension, chronic migraines, osteoarthritis, tremors, schizoaffective disorder bipolar type. Patient has a long history of substance abuse, reports she has been clean since her last office visit.     Patient is on coumadin due to interaction with there psychiatric medication.   Patient denies any increased bruising. Patient denies abnormal bleeding: epistaxis, gingival bleeding, hematuria, dark tarry stools.  No fevers, increased fatigue, frequent infections, drenching night sweats, decreased appetite or unintentional weight loss.     We have made several adjustments since November to her Coumadin dose to maintain an INR between 2 and 3.  She is currently on 5 mg Coumadin Mondays through Fridays and 2 mg Saturdays and Sundays with weekly PT/INR checks.     Component      Latest Ref Rng 4/3/2025 4/8/2025 4/14/2025 4/21/2025 4/28/2025   PROTIME      12.3 - 15.0 seconds 44.1 (H)  15.0  24.9 (H)  29.1 (H)  30.9 (H)    POCT INR      0.85 - 1.19  4.83 (H)  1.15  2.26 (H)  2.77 (H)  3.00 (H)       Legend:  (H) High    Patient is scheduled to have foot surgery on 5/14/2025.  Previously we have bridged her with Lovenox 1mg/kg.          06/19/25: Patient presents for follow-up of her Coumadin management due to a saddle PE on 9/1/2023 and a left lower extremity DVT.  Yesterday, patient's INR was above 8, she was sent to the hospital and had 1 dose of vitamin K, repeat INR 6.73.  Patient denies any abnormal bruising or bleeding.  She reports that she randomly took an antibiotic cefadroxil that she had leftover due to an infection on her left lower extremity.  She had 2 doses.    Our plan is to take patient off Coumadin as she is no  "longer on Depakote, which interacts with Eliquis/Xarelto.       Review of Systems   Constitutional:  Negative for activity change, appetite change, diaphoresis, fatigue, fever and unexpected weight change.   HENT:  Negative for nosebleeds.    Eyes: Negative.    Respiratory:  Negative for cough, chest tightness and shortness of breath.    Cardiovascular:  Negative for chest pain, palpitations and leg swelling.   Gastrointestinal:  Negative for abdominal pain and blood in stool.   Endocrine: Negative.    Genitourinary:  Negative for hematuria.   Musculoskeletal: Negative.    Skin: Negative.    Neurological:  Negative for dizziness, light-headedness and headaches.   Hematological: Negative.            Objective   /82 (BP Location: Left arm, Patient Position: Sitting, Cuff Size: Adult)   Pulse 100   Temp 97.6 °F (36.4 °C) (Temporal)   Resp 16   Ht 5' 6\" (1.676 m)   Wt 135 kg (297 lb)   SpO2 97%   BMI 47.94 kg/m²     Physical Exam  Constitutional:       Appearance: Normal appearance.   HENT:      Head: Normocephalic and atraumatic.     Cardiovascular:      Rate and Rhythm: Regular rhythm.      Heart sounds: Normal heart sounds.   Pulmonary:      Breath sounds: Normal breath sounds.     Musculoskeletal:      Cervical back: Normal range of motion.      Right lower leg: No edema.      Left lower leg: No edema.     Skin:     General: Skin is warm and dry.     Neurological:      Mental Status: She is alert and oriented to person, place, and time.     Psychiatric:         Mood and Affect: Mood normal.         Behavior: Behavior normal.         Thought Content: Thought content normal.         Judgment: Judgment normal.         Labs: I have reviewed the following labs:  Results for orders placed or performed during the hospital encounter of 06/18/25   Protime-INR   Result Value Ref Range    Protime 58.3 (H) 12.3 - 15.0 seconds    INR 6.73 (HH) 0.85 - 1.19   APTT   Result Value Ref Range     (H) 23 - 34 " seconds     *Note: Due to a large number of results and/or encounters for the requested time period, some results have not been displayed. A complete set of results can be found in Results Review.   I spent 20 minutes in chart review, counseling, coordination of care, and documentation.    This note has been generated by voice recognition software system.  Therefore, there may be spelling, grammar, and or syntax errors. Please contact if questions arise.

## 2025-06-19 NOTE — ED PROVIDER NOTES
ED Disposition       None          Assessment & Plan       Medical Decision Making  54-year-old female presents for evaluation of elevated INR.  Patient is asymptomatic without current bleeding.  Recommend holding warfarin for 48 hours.  Will give oral vitamin K one-time dose in the department.  Patient to have repeat INR in 2 to 3 days.    Problems Addressed:  Elevated INR (international normalized ratio) due to prior anticoagulant medication ingestion: acute illness or injury    Amount and/or Complexity of Data Reviewed  External Data Reviewed: labs and notes.     Details: Labs obtained earlier today reviewed by me.  Notes from most recent hospital admission reviewed by me.  Labs: ordered.     Details: Labs ordered and independently interpreted by me.    Risk  Prescription drug management.  Risk Details: Patient instructed to hold warfarin dosing for the next 48 hours.  Patient does have an appointment with her hematologist tomorrow who plans to transition her to DOAC.             Medications   phytonadione (MEPHYTON) tablet 5 mg (5 mg Oral Given 6/18/25 1461)       ED Risk Strat Scores                    No data recorded                            History of Present Illness       Chief Complaint   Patient presents with    Abnormal Lab     Pt was sent in for abnormal labs. Her Pt INR was high. Currently has no complaints. No SOB or chest pain.        Past Medical History[1]   Past Surgical History[2]   Family History[3]   Social History[4]   E-Cigarette/Vaping    E-Cigarette Use Current Some Day User     Comments medical THC       E-Cigarette/Vaping Substances    Nicotine No     THC Yes     CBD No     Flavoring No     Other No     Unknown No       I have reviewed and agree with the history as documented.     54-year-old female presents to the emergency department for evaluation of abnormal outpatient labs.  Patient was found to have an elevated INR of 8.1 earlier today.  Patient takes warfarin for history of  pulmonary embolism.  She denies any current symptoms.  She has had no bleeding.  She suspects that her INR is elevated due to recent antibiotic use.  She is completing a course of antibiotics to treat cellulitis.  Patient was hospitalized last week for treatment of bilateral lower extremity edema and cellulitis.  Patient states that she does have been scheduled with her hematologist tomorrow to discuss transitioning from warfarin to DOAC.  Patient is wearing extremely tight socks in the emergency department which appears to be aggravating her bilateral lower extremity edema.  I recommend that she start wearing compression stockings and avoid wearing these constricting socks.      History provided by:  Patient and medical records   used: No    Medical Problem  Location:  Elevated INR  Quality:  Asymptomatic  Severity:  Unable to specify  Onset quality:  Unable to specify  Timing:  Unable to specify  Progression:  Unable to specify  Chronicity:  New  Context:  Takes warfarin for history of PE  Relieved by:  Nothing  Worsened by:  Nothing  Ineffective treatments:  No current bleeding  Associated symptoms: no diarrhea, no shortness of breath and no vomiting        Review of Systems   Respiratory:  Negative for shortness of breath.    Cardiovascular:  Positive for leg swelling.   Gastrointestinal:  Negative for anal bleeding, diarrhea and vomiting.   Genitourinary:  Negative for hematuria.   All other systems reviewed and are negative.          Objective       ED Triage Vitals [06/18/25 2225]   Temperature Pulse Blood Pressure Respirations SpO2 Patient Position - Orthostatic VS   98.1 °F (36.7 °C) (!) 111 121/57 18 94 % Sitting      Temp Source Heart Rate Source BP Location FiO2 (%) Pain Score    Oral Monitor Right arm -- --      Vitals      Date and Time Temp Pulse SpO2 Resp BP Pain Score FACES Pain Rating User   06/18/25 2225 98.1 °F (36.7 °C) 111 94 % 18 121/57 -- -- AM            Physical  Exam  Constitutional:       General: She is not in acute distress.     Appearance: She is well-developed. She is not ill-appearing or toxic-appearing.   HENT:      Head: Normocephalic.      Right Ear: External ear normal.      Left Ear: External ear normal.      Nose: Nose normal.      Mouth/Throat:      Pharynx: No oropharyngeal exudate.     Eyes:      General: No scleral icterus.     Conjunctiva/sclera: Conjunctivae normal.      Pupils: Pupils are equal, round, and reactive to light.       Cardiovascular:      Rate and Rhythm: Normal rate and regular rhythm.      Heart sounds: Normal heart sounds.   Pulmonary:      Effort: Pulmonary effort is normal.      Breath sounds: Normal breath sounds.   Abdominal:      General: Bowel sounds are normal. There is no distension.      Palpations: Abdomen is soft.      Tenderness: There is no abdominal tenderness. There is no guarding or rebound.     Musculoskeletal:         General: No tenderness or deformity. Normal range of motion.      Cervical back: Normal range of motion and neck supple.      Right lower leg: Edema present.      Left lower leg: Edema present.   Lymphadenopathy:      Cervical: No cervical adenopathy.     Skin:     General: Skin is warm and dry.      Findings: Erythema present. No rash.      Comments: Bilateral lower extremity edema with mild diffuse erythema, nontender.     Neurological:      Mental Status: She is alert and oriented to person, place, and time.      Cranial Nerves: No cranial nerve deficit.      Sensory: No sensory deficit.      Motor: No abnormal muscle tone.      Coordination: Coordination normal.      Gait: Gait normal.      Deep Tendon Reflexes: Reflexes are normal and symmetric.     Psychiatric:         Behavior: Behavior normal.         Thought Content: Thought content normal.         Judgment: Judgment normal.         Results Reviewed       Procedure Component Value Units Date/Time    Protime-INR [[id number]]     Lab Status: No result  Specimen: Blood     APTT [[id number]]     Lab Status: No result Specimen: Blood             No orders to display       Procedures    ED Medication and Procedure Management   Prior to Admission Medications   Prescriptions Last Dose Informant Patient Reported? Taking?   Aciphex 20 MG tablet  Self Yes No   Sig: Take by mouth as needed   Cholecalciferol (Vitamin D3) 125 MCG (5000 UT) capsule  Self No No   Sig: Take 1 capsule (5,000 Units total) by mouth daily   Invega Trinza 819 MG/2.63ML DWIGHT   Yes No   OXcarbazepine (TRILEPTAL) 150 mg tablet  Self No No   Sig: Take 3 tablets (450 mg total) by mouth every 12 (twelve) hours   albuterol (PROVENTIL HFA,VENTOLIN HFA) 90 mcg/act inhaler   Yes No   Sig: Inhale 2 puffs every 6 (six) hours as needed for wheezing   atoMOXetine (STRATTERA) 60 mg capsule  Self Yes No   Sig: Take 60 mg by mouth in the morning.   atorvastatin (LIPITOR) 80 mg tablet   No No   Sig: Take 1 tablet (80 mg total) by mouth daily   buPROPion (WELLBUTRIN XL) 150 mg 24 hr tablet  Self Yes No   buPROPion (WELLBUTRIN XL) 300 mg 24 hr tablet   Yes No   cloNIDine (CATAPRES) 0.3 mg tablet   No No   Sig: Take 1 tablet (0.3 mg total) by mouth 2 (two) times a day   cyanocobalamin (VITAMIN B-12) 1000 MCG tablet  Self No No   Sig: Take 1 tablet (1,000 mcg total) by mouth daily   furosemide (LASIX) 20 mg tablet   No No   Sig: TAKE 1 TABLET BY MOUTH DAILY   levocetirizine (XYZAL) 5 MG tablet  Self No No   Sig: TAKE ONE TABLET BY MOUTH IN THE EVENING DAILY   levothyroxine 125 mcg tablet   No No   Sig: TAKE 1 TABLET BY MOUTH DAILY IN THE EARLY MORNING   losartan (COZAAR) 100 MG tablet   No No   Sig: TAKE 1 TABLET BY MOUTH DAILY   metFORMIN (GLUCOPHAGE-XR) 750 mg 24 hr tablet   No No   Sig: TAKE 1 TABLET BY MOUTH DAILY WITH BREAKFAST   naltrexone (REVIA) 50 mg tablet  Self No No   Sig: TAKE 1 TABLET BY MOUTH DAILY   naproxen (Naprosyn) 500 mg tablet  Self No No   Sig: Take 1 tablet (500 mg total) by mouth 2 (two) times a  day with meals Take with food.   oxybutynin (DITROPAN) 5 mg tablet  Self No No   Sig: Take 1 tablet (5 mg total) by mouth 2 (two) times a day   pantoprazole (PROTONIX) 40 mg tablet  Self No No   Sig: Take 1 tablet (40 mg total) by mouth daily in the early morning   pilocarpine (SALAGEN) 5 mg tablet  Self No No   Sig: TAKE 1 TABLET (5 MG TOTAL) BY MOUTH THREE (THREE) TIMES A DAY   rOPINIRole (REQUIP) 1 mg tablet  Self Yes No   Sig: Take by mouth in the morning.   topiramate (TOPAMAX) 50 MG tablet   No No   Sig: Take 3 tablets (150 mg total) by mouth 2 (two) times a day for 1 day, THEN 2 tablets (100 mg total) 2 (two) times a day for 1 day, THEN 1.5 tablets (75 mg total) 2 (two) times a day for 1 day, THEN 1 tablet (50 mg total) 2 (two) times a day for 1 day, THEN 0.5 tablets (25 mg total) 2 (two) times a day for 1 day, THEN 0.5 tablets (25 mg total) daily for 1 day.   traZODone (DESYREL) 50 mg tablet   No No   Sig: Take 1 tablet (50 mg total) by mouth daily at bedtime as needed for sleep (insomnia)   trospium chloride (SANCTURA) 20 mg tablet  Self Yes No   Sig: Take 20 mg by mouth in the morning and 20 mg in the evening.   warfarin (COUMADIN) 2 mg tablet   No No   Sig: Do not start before June 7, 2025.      Facility-Administered Medications: None     Patient's Medications   Discharge Prescriptions    No medications on file     No discharge procedures on file.  ED SEPSIS DOCUMENTATION                [1]   Past Medical History:  Diagnosis Date    Acute deep vein thrombosis of lower leg, left ([facility]) 10/16/2023    Acute heart failure, unspecified heart failure type ([facility]) 05/06/2024    Anxiety     Arthritis     oa cassandra knees    Asthma     good control- no medications    Yan's esophagus     Bipolar affective disorder ([facility])     Cannabis abuse with unspecified 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[facility])     Chronic pain disorder     lumbar    Contusion of elbow 12/21/2021    COPD (chronic obstructive pulmonary disease) ([facility])     CPAP  "(continuous positive airway pressure) dependence     DDD (degenerative disc disease), lumbar 04/09/2015    Degenerative disc disease at L5-S1 level     Deliberate self-cutting     9/15/22per pt has not done recently-- does see a therapist and psychiatrist regularly    Depression 09/16/2008    Diarrhea 01/06/2022    Disease of thyroid gland     hypo    MARTINEZ (dyspnea on exertion)     per pt \"has had this with exertion and not new\"    Drug overdose 10/28/2008    suicide attempt and again drug overdose 7/2022--[facility]-Medical floor and than transferred to [facility]    Dysphagia     Dyspnea     Ecchymosis 01/06/2022    Edema     BLE    Elevated troponin 09/02/2023    Family history of blood clots     GERD (gastroesophageal reflux disease)     Grief 11/11/2023    Headache(784.0)     Headache, tension-type     Hemorrhage of rectum and anus 10/02/2017    Formatting of this note might be different from the original.  Added automatically from request for surgery [id number]    High blood pressure     History of COVID-19 12/30/2020    Symptoms started on 12/30/2020 and then got worse.  Today she is feeling a little bit better.  She is a candidate for monoclonal antibody infusion and set for 1/6/21 @ 1pm at [facility]. 01/07/21 - telemedicine -     History of kidney stones     Hyperlipidemia     Hypertension     Ingrown toenail 12/02/2023    Intentional overdose (HCC) 09/27/2023    Intentional self-harm by unspecified sharp object, sequela (HCC) 02/09/2023    Irritable bowel syndrome     Knee pain, bilateral     Especially right    Knee pain, right 02/23/2022    Medial epicondylitis of elbow, left 04/03/2018    Formatting of this note might be different from the original.  Added automatically from request for surgery [id number]    Medial epicondylitis of right elbow 07/17/2023    Medical marijuana use     Memory difficulties 08/06/2020    Memory loss     Migraines     Muscle weakness     legs    Obesity     Other psychoactive " substance abuse with unspecified psychoactive substance-induced disorder (HCC) 05/06/2024    Overactive bladder     Polysubstance abuse (HCC) 09/29/2023    Potential for cognitive impairment 06/17/2021    Primary osteoarthritis of both knees 08/08/2018    Pulmonary emboli (HCC)     Restrictive lung disease 02/01/2017    Last Assessment & Plan:   Formatting of this note might be different from the original.  I reviewed this problem throughout the rest of the note. Please refer to the other assessments for details.    Risk for falls     Sjogren's disease (HCC)     Sleep apnea     Stress incontinence     Suicidal deliberate poisoning (HCC) 07/13/2022    Suicidal ideations     Teeth missing     Toxic encephalopathy 11/08/2023    Tremor     per pt Tremors of Right Leg and both Arms    Visual impairment     Wears glasses    [2]   Past Surgical History:  Procedure Laterality Date    BREAST CYST EXCISION Right 1989    CARPAL TUNNEL RELEASE Left     CHOLECYSTECTOMY  05/2003    Laparoscopic    COLONOSCOPY      01/12/2009    DILATION AND CURETTAGE OF UTERUS      ELBOW SURGERY Left     x2. No hardware    ESOPHAGOGASTRODUODENOSCOPY      FOOT SURGERY Left     Plantar fasciotomy    HERNIA REPAIR      HYSTERECTOMY      laporoscopic, partial    KNEE ARTHROSCOPY Bilateral     OOPHORECTOMY Left 10/2015    MO CORRJ HLX VLGS BNCTY [facility] JOINT ARTHRODESIS Right 8/2/2024    Procedure: RIGHT FOOT LAPIDUS BUNIONECTOMY, 2ND HAMMERTOE REPAIR, SECOND METATARSAL OSTEOTOMY WITH SECOND PLANTAR PLATE REPAIR;  Surgeon: Kaz Bautista DPM;  Location:  MAIN OR;  Service: Podiatry    MO GASTROCNEMIUS RECESSION Left 02/24/2021    Procedure: RECESSION GASTROC OPEN;  Surgeon: Nomi Yang DPM;  Location:  MAIN OR;  Service: Podiatry    MO REMOVAL IMPLANT DEEP Right 5/14/2025    Procedure: FOOT REMOVAL OF PAINFUL HARDWARE, REVISION OF SCAR;  Surgeon: Kaz Bautista DPM;  Location:  MAIN OR;  Service: Podiatry    MO RPR  UMBILICAL HRNA 5 YRS/> REDUCIBLE N/A 2019    Procedure: REPAIR HERNIA UMBILICAL LAPAROSCOPIC W/ ROBOTICS;  Surgeon: Anahi Colon MD;  Location: [facility] Main OR;  Service: General    DC SPHINCTEROTOMY ANAL DIVISION SPHINCTER SPX N/A 2018    Procedure: EUA, LEFT PARTIAL INTERNAL SPHINCTEROTOMY;  Surgeon: Tien Reyes MD;  Location: [facility] MAIN OR;  Service: Colorectal    DC TNOT ELBOW LATERAL/MEDIAL DEBRIDE OPEN TDN RPR Left 2022    Procedure: RELEASE EPICONDYLAR ELBOW MEDIAL;  Surgeon: Kyree Winkler MD;  Location: [facility];  Service: Orthopedics    REDUCTION MAMMAPLASTY Bilateral 1999    REPAIR LACERATION Left     left hand-2009    REPLACEMENT TOTAL KNEE Right     ROTATOR CUFF REPAIR Left     TONSILECTOMY AND ADNOIDECTOMY      US GUIDED MSK PROCEDURE  2021    VASCULAR SURGERY      VEIN LIGATION Bilateral     WISDOM TOOTH EXTRACTION     [3]   Family History  Problem Relation Name Age of Onset    Kidney cancer Mother anastacio     Diabetes Mother anastacio     Depression Mother anastacio     Stroke Mother anastacio     Arthritis Mother anastacio     Cancer Mother anastacio     Psychiatric Illness Mother anastacio     No Known Problems Father      No Known Problems Sister      No Known Problems Maternal Grandmother      No Known Problems Maternal Grandfather      No Known Problems Paternal Grandmother      No Known Problems Paternal Grandfather      Colon cancer Maternal Uncle      Colon cancer Maternal Uncle      Colon cancer Paternal Uncle      Colon cancer Family fx     Alcohol abuse Sister bharti     Asthma Sister bharti    [4]   Social History  Tobacco Use    Smoking status: Former     Current packs/day: 0.00     Average packs/day: 2.0 packs/day for 33.0 years (66.0 ttl pk-yrs)     Types: Cigarettes     Start date: 1985     Quit date: 2018     Years since quittin.4    Smokeless tobacco: Never    Tobacco comments:     Started at age 16.   Vaping Use    Vaping status: Some Days     Substances: THC   Substance Use Topics    Alcohol use: Not Currently     Comment: Recovering alcoholic    Drug use: Yes     Types: Marijuana     Comment: Former meth use, medicinal marijuana(medical card)        Mere Long DO  06/20/25 0674

## 2025-06-20 ENCOUNTER — DOCUMENTATION (OUTPATIENT)
Dept: CASE MANAGEMENT | Facility: HOSPITAL | Age: 55
End: 2025-06-20

## 2025-06-20 ENCOUNTER — TELEPHONE (OUTPATIENT)
Dept: HEMATOLOGY ONCOLOGY | Facility: CLINIC | Age: 55
End: 2025-06-20

## 2025-06-20 ENCOUNTER — APPOINTMENT (OUTPATIENT)
Dept: LAB | Facility: CLINIC | Age: 55
End: 2025-06-20
Payer: COMMERCIAL

## 2025-06-20 DIAGNOSIS — Z86.711 HISTORY OF PULMONARY EMBOLISM: Primary | ICD-10-CM

## 2025-06-20 DIAGNOSIS — Z86.711 HISTORY OF PULMONARY EMBOLISM: ICD-10-CM

## 2025-06-20 DIAGNOSIS — Z86.718 HISTORY OF DVT (DEEP VEIN THROMBOSIS): ICD-10-CM

## 2025-06-20 LAB
INR PPP: 1.44 (ref 0.85–1.19)
PROTHROMBIN TIME: 17.8 SECONDS (ref 12.3–15)

## 2025-06-20 PROCEDURE — 85610 PROTHROMBIN TIME: CPT

## 2025-06-20 PROCEDURE — 36415 COLL VENOUS BLD VENIPUNCTURE: CPT

## 2025-06-20 NOTE — BEHAVIORAL HEALTH HIGH UTILIZER
Patient Name:Blanca Mason MRN:  [id number]         : 1970     Age: 54 y.o.    Sex: female   Utilization History:  (# of ED visits & IP admits; reasons)  Pt had 7 [facility]-ED visits from 2024- 2025. ED presentations were related to SI with no plan or with a plan to overdose on medications. Pt intentionally overdosed on Flexeril twice and Trazodone twice. Pt reports periodic non-compliance with her medications.       Medical presentations were related to chest pain, right knee pain, withdrawing from meth, asthma exacerbation, fecal impaction, abdominal pain, toe or leg pain, dyspnea, SOB, bilateral leg edema, acute Saddle PE, acute respiratory failure, left shoulder pain, elevated INR, cellulitis of right lower extremity, right foot pain.    Treatment Recommendations & Presentation:  Presentation in the ED: Pt typically presents self to ED's. ED visits were related to SI with no plan or with a plan to overdose on medications. Pt intentionally overdosed on Flexeril twice and Trazodone twice. Pt reports periodic non-compliance with her medications.       Medical visits were related to chest pain, right knee pain, withdrawing from meth, asthma exacerbation, fecal impaction, abdominal pain, toe or leg pain, dyspnea, SOB, bilateral leg edema, acute Saddle PE, acute respiratory failure, elevated INR, cellulitis right lower extremity, right foot pain.              ED Recommendations: Following medical evaluation and treatment, establish acute risk versus pt's chronic behavioral disturbances. Please contact pt's [facility] ACT team to develop a safe discharge plan for pt to return home at [phone]. Pt should be contacting her ACT team when in Crisis and formulating an action plan to include distress tolerance coping skills.   Pt should continue her medical treatment with her multiple medical speciality providers.        Home Medication Regimen:  see most recent documentation in Epic, you can also verify  pt's medications with her ACT team    Recent Medical Work Ups:  (include Psych testing or ECT)     Labs -   Various Xrays, CT's, ultrasound -   ECG, stress test -   Right foot and toe surgery 2024 Inpatient Recommendations: Collaborate with pt's community resources to develop a comprehensive discharge plan.         Outpatient recommendations: Pt should continue her medical and mental health care with her community providers and take her medications as prescribed.      Situation/Relevant Background Info:      Pt is a 54 year old female with a diagnosis of Schizoaffective Disorder/ Bipolar type, Borderline Personality Disorder and a past history of meth abuse (clean since 2023 but uses medical marijuana) and numerous behavioral health hospitalizations.     Pt lives alone in an apartment that is close to where her father resides and has the [facility] ACT team. Pt's mother  a few years ago in  and pt reports this as a difficult issue for her.   Pt has experienced various medical issues in  and appears to be very preoccupied with her medical conditions, perceived medical issues and treatment and becomes frustrated if she feels she is not obtaining the medical care she needs in a timely manner. Pt has displayed demanding and agitated behaviors while on behavioral health units.  Pt was active in going to see her father in the community and helping him as well as working part-time approx 21 hours per week. It is unknown if pt is still working. Pt appears to have poor distress tolerance and impulsivity. Pt appears to want to control her discharge date from inpatient units.  Pt has multiple medical speciality providers in addition to her PCP office: [facility], [facility], [facility],  [facility], [facility],  [facility],  [facility].  Pt had right foot bunionectomy and hammertoe surgery 2024.       Diagnoses/Significant Problems (Medical &  Psychiatric):  Schizoaffective disorder, bipolar type, Borderline Personality Disorder  Active Problems:    Benign essential hypertension    Edema    Hypothyroidism    MAG    Overactive bladder    Sjogren's syndrome (HCC)    COPD (chronic obstructive pulmonary disease) (HCC)    Medical clearance for psychiatric admission    History of pulmonary embolism    Ingrown toenail           Drivers of repeated utilization:      preoccupation with medical concerns and issues, personality disordered behaviors, limited to poor coping skills, impulsivity, secondary gains from ED visits and hospitalizations.                                             Existing Community Resources & Supports:                 father Luigi Mason - [phone]     Patient Medical & Psychiatric Care Team: Confirmed via faisal review 06/2025  [facility] PCP - [phone]   [facility]  [facility]  [facility]  [facility]  [facility]  [facility]      Care plan date: 06/20/25                   Author:  Cherie Nieves RN                 Date reviewed with patient:

## 2025-06-20 NOTE — TELEPHONE ENCOUNTER
Spoke with Blanca and informed her that her INR is 1.44.  We can transition her to a DOAC, Xarelto 20 mg daily.  I have sent the medication to the pharmacy to be filled.  I will like for her to start that today.  Reiterated and made patient repeat that she is no longer to take Coumadin at all.  It has been discontinued.  She is being transition to Xarelto 20 mg daily.  I had spoken with the pharmacy earlier this week, the medication is covered by her insurance and she has no co-pay.  Patient will inform me before the end of the day if she has the medication or not.

## 2025-06-22 ENCOUNTER — HOSPITAL ENCOUNTER (INPATIENT)
Facility: HOSPITAL | Age: 55
LOS: 1 days | Discharge: HOME | End: 2025-06-24
Attending: EMERGENCY MEDICINE | Admitting: HOSPITALIST
Payer: COMMERCIAL

## 2025-06-22 ENCOUNTER — APPOINTMENT (EMERGENCY)
Dept: RADIOLOGY | Facility: HOSPITAL | Age: 55
End: 2025-06-22
Payer: COMMERCIAL

## 2025-06-22 DIAGNOSIS — L03.115 BILATERAL LOWER LEG CELLULITIS: Primary | ICD-10-CM

## 2025-06-22 DIAGNOSIS — L03.115 CELLULITIS OF RIGHT LOWER EXTREMITY: ICD-10-CM

## 2025-06-22 DIAGNOSIS — L03.116 BILATERAL LOWER LEG CELLULITIS: Primary | ICD-10-CM

## 2025-06-22 DIAGNOSIS — R79.1 SUBTHERAPEUTIC INTERNATIONAL NORMALIZED RATIO (INR): ICD-10-CM

## 2025-06-22 DIAGNOSIS — R26.2 AMBULATORY DYSFUNCTION: ICD-10-CM

## 2025-06-22 PROBLEM — L03.119 CELLULITIS OF LOWER EXTREMITY: Status: ACTIVE | Noted: 2025-06-22

## 2025-06-22 LAB
ALBUMIN SERPL BCG-MCNC: 3.6 G/DL (ref 3.5–5)
ALP SERPL-CCNC: 70 U/L (ref 34–104)
ALT SERPL W P-5'-P-CCNC: 10 U/L (ref 7–52)
ANION GAP SERPL CALCULATED.3IONS-SCNC: 9 MMOL/L (ref 4–13)
APTT PPP: 38 SECONDS (ref 23–34)
AST SERPL W P-5'-P-CCNC: 14 U/L (ref 13–39)
BASOPHILS # BLD AUTO: 0.04 THOUSANDS/ÂΜL (ref 0–0.1)
BASOPHILS NFR BLD AUTO: 0 % (ref 0–1)
BILIRUB SERPL-MCNC: 0.5 MG/DL (ref 0.2–1)
BUN SERPL-MCNC: 8 MG/DL (ref 5–25)
CALCIUM SERPL-MCNC: 9 MG/DL (ref 8.4–10.2)
CHLORIDE SERPL-SCNC: 107 MMOL/L (ref 96–108)
CO2 SERPL-SCNC: 25 MMOL/L (ref 21–32)
CREAT SERPL-MCNC: 0.58 MG/DL (ref 0.6–1.3)
CRP SERPL QL: 36.3 MG/L
EOSINOPHIL # BLD AUTO: 0.12 THOUSAND/ÂΜL (ref 0–0.61)
EOSINOPHIL NFR BLD AUTO: 1 % (ref 0–6)
ERYTHROCYTE [DISTWIDTH] IN BLOOD BY AUTOMATED COUNT: 16.4 % (ref 11.6–15.1)
GFR SERPL CREATININE-BSD FRML MDRD: 105 ML/MIN/1.73SQ M
GLUCOSE SERPL-MCNC: 100 MG/DL (ref 65–140)
GLUCOSE SERPL-MCNC: 105 MG/DL (ref 65–140)
GLUCOSE SERPL-MCNC: 97 MG/DL (ref 65–140)
HCT VFR BLD AUTO: 39.3 % (ref 34.8–46.1)
HGB BLD-MCNC: 12.6 G/DL (ref 11.5–15.4)
IMM GRANULOCYTES # BLD AUTO: 0.06 THOUSAND/UL (ref 0–0.2)
IMM GRANULOCYTES NFR BLD AUTO: 1 % (ref 0–2)
INR PPP: 1.53 (ref 0.85–1.19)
LYMPHOCYTES # BLD AUTO: 2.55 THOUSANDS/ÂΜL (ref 0.6–4.47)
LYMPHOCYTES NFR BLD AUTO: 28 % (ref 14–44)
MCH RBC QN AUTO: 28.3 PG (ref 26.8–34.3)
MCHC RBC AUTO-ENTMCNC: 32.1 G/DL (ref 31.4–37.4)
MCV RBC AUTO: 88 FL (ref 82–98)
MONOCYTES # BLD AUTO: 1.07 THOUSAND/ÂΜL (ref 0.17–1.22)
MONOCYTES NFR BLD AUTO: 12 % (ref 4–12)
NEUTROPHILS # BLD AUTO: 5.19 THOUSANDS/ÂΜL (ref 1.85–7.62)
NEUTS SEG NFR BLD AUTO: 58 % (ref 43–75)
NRBC BLD AUTO-RTO: 0 /100 WBCS
PLATELET # BLD AUTO: 268 THOUSANDS/UL (ref 149–390)
PMV BLD AUTO: 9.8 FL (ref 8.9–12.7)
POTASSIUM SERPL-SCNC: 3.6 MMOL/L (ref 3.5–5.3)
PROT SERPL-MCNC: 6.7 G/DL (ref 6.4–8.4)
PROTHROMBIN TIME: 19.1 SECONDS (ref 12.3–15)
RBC # BLD AUTO: 4.45 MILLION/UL (ref 3.81–5.12)
SODIUM SERPL-SCNC: 141 MMOL/L (ref 135–147)
WBC # BLD AUTO: 9.03 THOUSAND/UL (ref 4.31–10.16)

## 2025-06-22 PROCEDURE — 73590 X-RAY EXAM OF LOWER LEG: CPT

## 2025-06-22 PROCEDURE — 36415 COLL VENOUS BLD VENIPUNCTURE: CPT

## 2025-06-22 PROCEDURE — 96365 THER/PROPH/DIAG IV INF INIT: CPT

## 2025-06-22 PROCEDURE — 85025 COMPLETE CBC W/AUTO DIFF WBC: CPT

## 2025-06-22 PROCEDURE — 86140 C-REACTIVE PROTEIN: CPT | Performed by: EMERGENCY MEDICINE

## 2025-06-22 PROCEDURE — 85730 THROMBOPLASTIN TIME PARTIAL: CPT

## 2025-06-22 PROCEDURE — 99285 EMERGENCY DEPT VISIT HI MDM: CPT | Performed by: EMERGENCY MEDICINE

## 2025-06-22 PROCEDURE — 85610 PROTHROMBIN TIME: CPT

## 2025-06-22 PROCEDURE — 96366 THER/PROPH/DIAG IV INF ADDON: CPT

## 2025-06-22 PROCEDURE — 82948 REAGENT STRIP/BLOOD GLUCOSE: CPT

## 2025-06-22 PROCEDURE — 99285 EMERGENCY DEPT VISIT HI MDM: CPT

## 2025-06-22 PROCEDURE — 87040 BLOOD CULTURE FOR BACTERIA: CPT | Performed by: EMERGENCY MEDICINE

## 2025-06-22 PROCEDURE — 99222 1ST HOSP IP/OBS MODERATE 55: CPT | Performed by: HOSPITALIST

## 2025-06-22 PROCEDURE — 80053 COMPREHEN METABOLIC PANEL: CPT

## 2025-06-22 PROCEDURE — 73502 X-RAY EXAM HIP UNI 2-3 VIEWS: CPT

## 2025-06-22 RX ORDER — FUROSEMIDE 10 MG/ML
40 INJECTION INTRAMUSCULAR; INTRAVENOUS
Status: COMPLETED | OUTPATIENT
Start: 2025-06-22 | End: 2025-06-22

## 2025-06-22 RX ORDER — TRAZODONE HYDROCHLORIDE 50 MG/1
50 TABLET ORAL
Status: DISCONTINUED | OUTPATIENT
Start: 2025-06-22 | End: 2025-06-24 | Stop reason: HOSPADM

## 2025-06-22 RX ORDER — LOSARTAN POTASSIUM 50 MG/1
100 TABLET ORAL DAILY
Status: DISCONTINUED | OUTPATIENT
Start: 2025-06-22 | End: 2025-06-24 | Stop reason: HOSPADM

## 2025-06-22 RX ORDER — INSULIN LISPRO 100 [IU]/ML
2-12 INJECTION, SOLUTION INTRAVENOUS; SUBCUTANEOUS
Status: DISCONTINUED | OUTPATIENT
Start: 2025-06-22 | End: 2025-06-24 | Stop reason: HOSPADM

## 2025-06-22 RX ORDER — CEFAZOLIN SODIUM 2 G/50ML
2000 SOLUTION INTRAVENOUS EVERY 8 HOURS
Status: DISCONTINUED | OUTPATIENT
Start: 2025-06-22 | End: 2025-06-24 | Stop reason: HOSPADM

## 2025-06-22 RX ORDER — ROPINIROLE 1 MG/1
1 TABLET, FILM COATED ORAL DAILY
Status: DISCONTINUED | OUTPATIENT
Start: 2025-06-22 | End: 2025-06-24 | Stop reason: HOSPADM

## 2025-06-22 RX ORDER — LEVOTHYROXINE SODIUM 125 UG/1
125 TABLET ORAL
Status: DISCONTINUED | OUTPATIENT
Start: 2025-06-23 | End: 2025-06-24 | Stop reason: HOSPADM

## 2025-06-22 RX ORDER — ATORVASTATIN CALCIUM 40 MG/1
80 TABLET, FILM COATED ORAL DAILY
Status: DISCONTINUED | OUTPATIENT
Start: 2025-06-22 | End: 2025-06-24 | Stop reason: HOSPADM

## 2025-06-22 RX ORDER — NALTREXONE HYDROCHLORIDE 50 MG/1
50 TABLET, FILM COATED ORAL DAILY
Status: DISCONTINUED | OUTPATIENT
Start: 2025-06-22 | End: 2025-06-24 | Stop reason: HOSPADM

## 2025-06-22 RX ORDER — OXCARBAZEPINE 150 MG/1
450 TABLET, FILM COATED ORAL EVERY 12 HOURS SCHEDULED
Status: DISCONTINUED | OUTPATIENT
Start: 2025-06-22 | End: 2025-06-24 | Stop reason: HOSPADM

## 2025-06-22 RX ORDER — CLONIDINE HYDROCHLORIDE 0.1 MG/1
0.3 TABLET ORAL 2 TIMES DAILY
Status: DISCONTINUED | OUTPATIENT
Start: 2025-06-22 | End: 2025-06-24 | Stop reason: HOSPADM

## 2025-06-22 RX ORDER — BUPROPION HYDROCHLORIDE 150 MG/1
150 TABLET ORAL DAILY
Status: DISCONTINUED | OUTPATIENT
Start: 2025-06-22 | End: 2025-06-22

## 2025-06-22 RX ORDER — OXYBUTYNIN CHLORIDE 5 MG/1
5 TABLET ORAL 2 TIMES DAILY
Status: DISCONTINUED | OUTPATIENT
Start: 2025-06-22 | End: 2025-06-24 | Stop reason: HOSPADM

## 2025-06-22 RX ORDER — BUPROPION HYDROCHLORIDE 150 MG/1
450 TABLET ORAL DAILY
Status: DISCONTINUED | OUTPATIENT
Start: 2025-06-22 | End: 2025-06-24 | Stop reason: HOSPADM

## 2025-06-22 RX ORDER — LORATADINE 10 MG/1
10 TABLET ORAL DAILY
Status: DISCONTINUED | OUTPATIENT
Start: 2025-06-22 | End: 2025-06-24 | Stop reason: HOSPADM

## 2025-06-22 RX ORDER — PANTOPRAZOLE SODIUM 40 MG/1
40 TABLET, DELAYED RELEASE ORAL
Status: DISCONTINUED | OUTPATIENT
Start: 2025-06-23 | End: 2025-06-24 | Stop reason: HOSPADM

## 2025-06-22 RX ORDER — ATOMOXETINE 60 MG/1
60 CAPSULE ORAL DAILY
Status: DISCONTINUED | OUTPATIENT
Start: 2025-06-22 | End: 2025-06-24 | Stop reason: HOSPADM

## 2025-06-22 RX ORDER — PILOCARPINE HYDROCHLORIDE 5 MG/1
5 TABLET, FILM COATED ORAL 3 TIMES DAILY
Status: DISCONTINUED | OUTPATIENT
Start: 2025-06-22 | End: 2025-06-24 | Stop reason: HOSPADM

## 2025-06-22 RX ORDER — ACETAMINOPHEN 325 MG/1
650 TABLET ORAL EVERY 6 HOURS PRN
Status: DISCONTINUED | OUTPATIENT
Start: 2025-06-22 | End: 2025-06-24 | Stop reason: HOSPADM

## 2025-06-22 RX ORDER — ALBUTEROL SULFATE 90 UG/1
2 INHALANT RESPIRATORY (INHALATION) EVERY 6 HOURS PRN
Status: DISCONTINUED | OUTPATIENT
Start: 2025-06-22 | End: 2025-06-24 | Stop reason: HOSPADM

## 2025-06-22 RX ORDER — ACETAMINOPHEN 10 MG/ML
1000 INJECTION, SOLUTION INTRAVENOUS ONCE
Status: COMPLETED | OUTPATIENT
Start: 2025-06-22 | End: 2025-06-22

## 2025-06-22 RX ORDER — INSULIN LISPRO 100 [IU]/ML
1-5 INJECTION, SOLUTION INTRAVENOUS; SUBCUTANEOUS
Status: DISCONTINUED | OUTPATIENT
Start: 2025-06-22 | End: 2025-06-24 | Stop reason: HOSPADM

## 2025-06-22 RX ADMIN — Medication 1000 UNITS: at 14:42

## 2025-06-22 RX ADMIN — ATORVASTATIN CALCIUM 80 MG: 40 TABLET, FILM COATED ORAL at 14:42

## 2025-06-22 RX ADMIN — PILOCARPINE HYDROCHLORIDE 5 MG: 5 TABLET, FILM COATED ORAL at 16:19

## 2025-06-22 RX ADMIN — FUROSEMIDE 40 MG: 10 INJECTION, SOLUTION INTRAVENOUS at 07:53

## 2025-06-22 RX ADMIN — OXCARBAZEPINE 450 MG: 150 TABLET, FILM COATED ORAL at 16:28

## 2025-06-22 RX ADMIN — CEFEPIME 2000 MG: 2 INJECTION, POWDER, FOR SOLUTION INTRAVENOUS at 07:53

## 2025-06-22 RX ADMIN — LORATADINE 10 MG: 10 TABLET ORAL at 14:41

## 2025-06-22 RX ADMIN — CEFAZOLIN SODIUM 2000 MG: 2 SOLUTION INTRAVENOUS at 18:36

## 2025-06-22 RX ADMIN — NALTREXONE HYDROCHLORIDE 50 MG: 50 TABLET, FILM COATED ORAL at 16:18

## 2025-06-22 RX ADMIN — CYANOCOBALAMIN TAB 500 MCG 1000 MCG: 500 TAB at 16:23

## 2025-06-22 RX ADMIN — ACETAMINOPHEN 1000 MG: 10 INJECTION, SOLUTION INTRAVENOUS at 04:57

## 2025-06-22 RX ADMIN — OXCARBAZEPINE 450 MG: 150 TABLET, FILM COATED ORAL at 23:15

## 2025-06-22 RX ADMIN — PILOCARPINE HYDROCHLORIDE 5 MG: 5 TABLET, FILM COATED ORAL at 23:15

## 2025-06-22 RX ADMIN — ROPINIROLE HYDROCHLORIDE 1 MG: 1 TABLET, FILM COATED ORAL at 16:16

## 2025-06-22 RX ADMIN — VANCOMYCIN HYDROCHLORIDE 2000 MG: 5 INJECTION, POWDER, LYOPHILIZED, FOR SOLUTION INTRAVENOUS at 09:53

## 2025-06-22 RX ADMIN — ATOMOXETINE 60 MG: 60 CAPSULE ORAL at 16:26

## 2025-06-22 RX ADMIN — CLONIDINE HYDROCHLORIDE 0.3 MG: 0.1 TABLET ORAL at 18:36

## 2025-06-22 RX ADMIN — FUROSEMIDE 40 MG: 10 INJECTION, SOLUTION INTRAVENOUS at 16:29

## 2025-06-22 RX ADMIN — BUPROPION HYDROCHLORIDE 450 MG: 150 TABLET, EXTENDED RELEASE ORAL at 16:21

## 2025-06-22 RX ADMIN — OXYBUTYNIN CHLORIDE 5 MG: 5 TABLET ORAL at 18:36

## 2025-06-22 RX ADMIN — LOSARTAN POTASSIUM 100 MG: 50 TABLET, FILM COATED ORAL at 14:41

## 2025-06-22 NOTE — ASSESSMENT & PLAN NOTE
Mood appears stable.    Last seen by psychiatry early this month during hospitalization   Topamax taper until discontinuation - patient receives pill packs at home   Strattera 60 mg daily  Wellbutrin 450 mg xl daily  Naltrexone 50 mg daily  Trileptal 450 mg q12 hours  Invega injection   Trazodone PRN  Cogentin, austedo, latuda were stopped

## 2025-06-22 NOTE — ED ATTENDING ATTESTATION
6/22/2025  I, Nomi Cintron MD, saw and evaluated the patient. I have discussed the patient with the resident/non-physician practitioner and agree with the resident's/non-physician practitioner's findings, Plan of Care, and MDM as documented in the resident's/non-physician practitioner's note, except where noted. All available labs and Radiology studies were reviewed.  I was present for key portions of any procedure(s) performed by the resident/non-physician practitioner and I was immediately available to provide assistance.       At this point I agree with the current assessment done in the Emergency Department.  I have conducted an independent evaluation of this patient a history and physical is as follows: Patient is a 54 year old female with worsening swelling and redness and pain of both lower legs for past few weeks. States she was admitted for IV abx a few weeks ago. Last Tdap was in 2018. Was last seen at [facility] in [place] on 6/19/25 for h/o PE and patient is on coumadin and was seen here recently for abnormally elevated INR and got vitamin K po. (+) R hip pain. [facility] website checked on this patient and last Rx filled was on 5/13/25 for tramadol for 5 day supply. NCAT. No scleral icterus. Mucous membranes somewhat dry. Lungs clear. Heart regular. Abdomen soft and nontender. Good bowel sounds. (+) large bilateral LE edema with erythema bilaterally with tenderness bilateral lower legs. Feet not erythematous. DDX including but not limited to: cellulitis, osteomyelitis, lymphangitis, folliculitis, carbuncle; doubt abscess, rhabdomyolysis, necrotizing fasciitis, allergic reaction, angioedema, DVT, fracture. Will check labs and x-rays and give IV abx and will need admission.     ED Course         Critical Care Time  Procedures

## 2025-06-22 NOTE — ASSESSMENT & PLAN NOTE
Hx lymphedema maintained on lasix daily   Will give lasix 40 mg IV for two doses and evaluate response   Compression stockings if tolerated

## 2025-06-22 NOTE — H&P
H&P - Hospitalist   Name: Blanca Mason 54 y.o. female I MRN: [id number]  Unit/Bed#: ED-05 I Date of Admission: 6/22/2025   Date of Service: 6/22/2025 I Hospital Day: 0     Assessment & Plan  Cellulitis of lower extremity  Presented due to lower extremity swelling and erythema.  No SIRS.  Xrays pending, no obvious osteomyelitis   Hospitalized 6/4-6/6 treated with ancef for possible cellulitis.  Discharged on oral antibiotics.  Admits she never finished taking antibiotics.   Antibiotics: cefazolin   Bilateral leg edema  Hx lymphedema maintained on lasix daily   Will give lasix 40 mg IV for two doses and evaluate response   Compression stockings if tolerated  Benign essential hypertension  Stable on losartan 100 mg daily, lasix 20 mg daily, clonidine 0.3 mg TID  Monitor w IV diuresis  Schizoaffective disorder, bipolar type (HCC)  Mood appears stable.    Last seen by psychiatry early this month during hospitalization   Topamax taper until discontinuation - patient receives pill packs at home   Strattera 60 mg daily  Wellbutrin 450 mg xl daily  Naltrexone 50 mg daily  Trileptal 450 mg q12 hours  Invega injection   Trazodone PRN  Cogentin, austedo, latuda were stopped  MAG (obstructive sleep apnea)  Continue home cpap  BMI 45.0-49.9, adult ([facility])  Bmi 47  Complicating all aspects of care  Substance abuse ([facility])  Recent UDS + THC  Former meth use  History of DVT (deep vein thrombosis)  Now in xarelto      VTE Pharmacologic Prophylaxis: VTE Score: 6 High Risk (Score >/= 5) - Pharmacological DVT Prophylaxis Ordered: rivaroxaban (Xarelto). Sequential Compression Devices Ordered.  Code Status: Prior full  Discussion with family: Patient declined call to .     Anticipated Length of Stay: Patient will be admitted on an observation basis with an anticipated length of stay of less than 2 midnights secondary to IV antibiotics.    History of Present Illness   Chief Complaint: lower extremity swelling and  suzan Mason is a 54 y.o. female with a PMH of obesity, pre diabetes, PE/DVT on xarelto, lymphedema, hypothyroidism, gerd, htn, copd/asthma, migraine, borderline personality disorder, harvey on cpap,  who presents with ongoing swelling, erythema of bilateral lower extremities.  It is unclear exactly how long this has been going on.  The patient was admitted earlier this month with concern for cellulitis of the lower extremities.  He was treated with ancef and discharged on duricef.  Unfortunately the patient did not complete the oral duricef on discharge.  It sounds like erythema never truly improved.  Fortunately she is systemically well.  She has had no fevers.  She has been able to bear weight but her legs are quite tender.  She has had some clear drainage from the right lower extremity.     She has steri strips in place to the right foot.  Reports hardware was removed about 5 weeks ago and was supposed to have op follow up tomorrow.     Review of Systems   Constitutional:  Negative for appetite change and fever.   Respiratory:  Negative for cough, chest tightness and shortness of breath.    Cardiovascular:  Positive for leg swelling. Negative for chest pain.   Musculoskeletal:  Positive for gait problem.   Skin:  Positive for color change and wound.   All other systems reviewed and are negative.      Historical Information   Past Medical History[1]  Past Surgical History[2]  Social History[3]  E-Cigarette/Vaping    E-Cigarette Use Current Some Day User     Comments medical THC      E-Cigarette/Vaping Substances    Nicotine No     THC Yes     CBD No     Flavoring No     Other No     Unknown No      Family History[4]  Social History:  Marital Status: Single   Occupation:   Patient Pre-hospital Living Situation: Home  Patient Pre-hospital Level of Mobility: walks  Patient Pre-hospital Diet Restrictions:     Meds/Allergies   I have reviewed home medications with a medical source (PCP, Pharmacy,  other).  Prior to Admission medications    Medication Sig Start Date End Date Taking? Authorizing Provider   Aciphex 20 MG tablet Take by mouth as needed 8/8/24   Historical Provider, MD   albuterol (PROVENTIL HFA,VENTOLIN HFA) 90 mcg/act inhaler Inhale 2 puffs every 6 (six) hours as needed for wheezing    Historical Provider, MD   atoMOXetine (STRATTERA) 60 mg capsule Take 60 mg by mouth in the morning. 3/6/24   Historical Provider, MD   atorvastatin (LIPITOR) 80 mg tablet Take 1 tablet (80 mg total) by mouth daily 3/4/25   Heron Prabhakar MD   buPROPion (WELLBUTRIN XL) 150 mg 24 hr tablet  3/1/24   Historical Provider, MD   buPROPion (WELLBUTRIN XL) 300 mg 24 hr tablet  4/14/25   Historical Provider, MD   Cholecalciferol (Vitamin D3) 125 MCG (5000 UT) capsule Take 1 capsule (5,000 Units total) by mouth daily 4/4/24   JHONATAN Armando   cloNIDine (CATAPRES) 0.3 mg tablet Take 1 tablet (0.3 mg total) by mouth 2 (two) times a day 2/20/25   Heron Prabhakar MD   cyanocobalamin (VITAMIN B-12) 1000 MCG tablet Take 1 tablet (1,000 mcg total) by mouth daily 4/4/24 3/12/25  JHONATAN Armando   furosemide (LASIX) 20 mg tablet TAKE 1 TABLET BY MOUTH DAILY 3/5/25   JHONATAN Armando   Invega Trinza 819 MG/2.63ML DWIGHT  5/7/25   Historical Provider, MD   levocetirizine (XYZAL) 5 MG tablet TAKE ONE TABLET BY MOUTH IN THE EVENING DAILY 2/19/25   JHONATAN Armando   levothyroxine 125 mcg tablet TAKE 1 TABLET BY MOUTH DAILY IN THE EARLY MORNING 3/5/25   JHONATAN Armando   losartan (COZAAR) 100 MG tablet TAKE 1 TABLET BY MOUTH DAILY 3/5/25   JHONATAN Armando   metFORMIN (GLUCOPHAGE-XR) 750 mg 24 hr tablet TAKE 1 TABLET BY MOUTH DAILY WITH BREAKFAST 3/5/25   JHONATAN Armando   naltrexone (REVIA) 50 mg tablet TAKE 1 TABLET BY MOUTH DAILY 2/19/25   Shameka Phillips, CRNP   naproxen (Naprosyn) 500 mg tablet Take 1 tablet (500 mg total) by mouth 2 (two) times a day with meals Take with food. 10/3/24   Joce Causey,  "PA-LUIS   OXcarbazepine (TRILEPTAL) 150 mg tablet Take 3 tablets (450 mg total) by mouth every 12 (twelve) hours 1/30/24 5/5/25  JHONATAN Anne   oxybutynin (DITROPAN) 5 mg tablet Take 1 tablet (5 mg total) by mouth 2 (two) times a day 4/4/24 5/5/25  JHONATAN Armando   pantoprazole (PROTONIX) 40 mg tablet Take 1 tablet (40 mg total) by mouth daily in the early morning 4/4/24   JHONATAN Armando   pilocarpine (SALAGEN) 5 mg tablet TAKE 1 TABLET (5 MG TOTAL) BY MOUTH THREE (THREE) TIMES A DAY 1/15/25   JHONATAN Armando   rivaroxaban (Xarelto) 20 mg tablet Take 1 tablet (20 mg total) by mouth daily with breakfast 6/20/25   JHONATAN Lee   rOPINIRole (REQUIP) 1 mg tablet Take by mouth in the morning.    Historical Provider, MD   topiramate (TOPAMAX) 50 MG tablet Take 3 tablets (150 mg total) by mouth 2 (two) times a day for 1 day, THEN 2 tablets (100 mg total) 2 (two) times a day for 1 day, THEN 1.5 tablets (75 mg total) 2 (two) times a day for 1 day, THEN 1 tablet (50 mg total) 2 (two) times a day for 1 day, THEN 0.5 tablets (25 mg total) 2 (two) times a day for 1 day, THEN 0.5 tablets (25 mg total) daily for 1 day. 6/6/25 6/12/25  Karuna Saenz DO   traZODone (DESYREL) 50 mg tablet Take 1 tablet (50 mg total) by mouth daily at bedtime as needed for sleep (insomnia) 6/6/25   Karuna Saenz DO   trospium chloride (SANCTURA) 20 mg tablet Take 20 mg by mouth in the morning and 20 mg in the evening. 7/8/24 7/8/25  Historical Provider, MD     Allergies   Allergen Reactions    Percocet [Oxycodone-Acetaminophen] Headache     Severe headaches   (denies issues with Tylenol)    Povidone Iodine Rash     Unsure if betadine or gauze post surgical. Got rash on leg.   Has  Had itchy rashes after every surgery prep and IV insertion    Chlorhexidine Rash     Per pt \"able to use the liquid soap--thinkd reaction from the sponges or wipes\"       Objective :  Temp:  [98 °F (36.7 °C)] 98 °F (36.7 °C)  HR:  [87-88] " 88  BP: (104-152)/(55-78) 104/55  Resp:  [16-20] 16  SpO2:  [94 %-98 %] 94 %  O2 Device: None (Room air)    Physical Exam  Constitutional:       General: She is not in acute distress.     Appearance: She is obese. She is not ill-appearing or toxic-appearing.   HENT:      Head: Normocephalic and atraumatic.      Right Ear: External ear normal.      Left Ear: External ear normal.      Nose: Nose normal.      Mouth/Throat:      Mouth: Mucous membranes are dry.     Eyes:      Extraocular Movements: Extraocular movements intact.      Conjunctiva/sclera: Conjunctivae normal.       Cardiovascular:      Rate and Rhythm: Normal rate and regular rhythm.      Pulses: Normal pulses.      Heart sounds: Normal heart sounds.   Pulmonary:      Effort: Pulmonary effort is normal.      Breath sounds: Normal breath sounds.   Abdominal:      General: Abdomen is flat.      Palpations: Abdomen is soft.     Musculoskeletal:         General: Swelling present.      Cervical back: Normal range of motion.      Right lower leg: Edema present.      Left lower leg: Edema present.     Skin:     General: Skin is warm and dry.      Capillary Refill: Capillary refill takes less than 2 seconds.      Findings: Erythema present.      Comments: Steri strips r foot     Neurological:      General: No focal deficit present.      Mental Status: She is alert and oriented to person, place, and time.     Psychiatric:         Mood and Affect: Mood normal.                        Lines/Drains:            Lab Results: I have reviewed the following results:  Results from last 7 days   Lab Units 06/22/25  0349   WBC Thousand/uL 9.03   HEMOGLOBIN g/dL 12.6   HEMATOCRIT % 39.3   PLATELETS Thousands/uL 268   SEGS PCT % 58   LYMPHO PCT % 28   MONO PCT % 12   EOS PCT % 1     Results from last 7 days   Lab Units 06/22/25  0349   SODIUM mmol/L 141   POTASSIUM mmol/L 3.6   CHLORIDE mmol/L 107   CO2 mmol/L 25   BUN mg/dL 8   CREATININE mg/dL 0.58*   ANION GAP mmol/L 9    CALCIUM mg/dL 9.0   ALBUMIN g/dL 3.6   TOTAL BILIRUBIN mg/dL 0.50   ALK PHOS U/L 70   ALT U/L 10   AST U/L 14   GLUCOSE RANDOM mg/dL 97     Results from last 7 days   Lab Units 06/22/25  0349   INR  1.53*         Lab Results   Component Value Date    HGBA1C 6.1 (H) 06/05/2025    HGBA1C 6.0 (H) 03/03/2025    HGBA1C 5.4 05/06/2024           Xrays pending  Other Study Results Review: No additional pertinent studies reviewed.    Administrative Statements   I have spent a total time of   minutes in caring for this patient on the day of the visit/encounter including Diagnostic results, Prognosis, Risks and benefits of tx options, Instructions for management, Patient and family education, Importance of tx compliance, Risk factor reductions, Impressions, Counseling / Coordination of care, Documenting in the medical record, Reviewing/placing orders in the medical record (including tests, medications, and/or procedures), Obtaining or reviewing history  , and Communicating with other healthcare professionals ..    ** Please Note: This note has been constructed using a voice recognition system. **         [1]   Past Medical History:  Diagnosis Date    Acute deep vein thrombosis of lower leg, left (HCC) 10/16/2023    Acute heart failure, unspecified heart failure type ([facility]) 05/06/2024    Anxiety     Arthritis     oa cassandra knees    Asthma     good control- no medications    Yan's esophagus     Bipolar affective disorder ([facility])     Cannabis abuse with unspecified 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[facility])     Chronic pain disorder     lumbar    Contusion of elbow 12/21/2021    COPD (chronic obstructive pulmonary disease) ([facility])     CPAP (continuous positive airway pressure) dependence     DDD (degenerative disc disease), lumbar 04/09/2015    Degenerative disc disease at L5-S1 level     Deliberate self-cutting     9/15/22per pt has not done recently-- does see a therapist and psychiatrist regularly    Depression 09/16/2008    Diarrhea  "01/06/2022    Disease of thyroid gland     hypo    MARTINEZ (dyspnea on exertion)     per pt \"has had this with exertion and not new\"    Drug overdose 10/28/2008    suicide attempt and again drug overdose 7/2022--[facility]Medical floor and than transferred to [facility]    Dysphagia     Dyspnea     Ecchymosis 01/06/2022    Edema     BLE    Elevated troponin 09/02/2023    Family history of blood clots     GERD (gastroesophageal reflux disease)     Grief 11/11/2023    Headache(784.0)     Headache, tension-type     Hemorrhage of rectum and anus 10/02/2017    Formatting of this note might be different from the original.  Added automatically from request for surgery [id number]    High blood pressure     History of COVID-19 12/30/2020    Symptoms started on 12/30/2020 and then got worse.  Today she is feeling a little bit better.  She is a candidate for monoclonal antibody infusion and set for 1/6/21 @ 1pm at [facility]. 01/07/21 - telemedicine -     History of kidney stones     Hyperlipidemia     Hypertension     Ingrown toenail 12/02/2023    Intentional overdose (HCC) 09/27/2023    Intentional self-harm by unspecified sharp object, sequela (HCC) 02/09/2023    Irritable bowel syndrome     Knee pain, bilateral     Especially right    Knee pain, right 02/23/2022    Medial epicondylitis of elbow, left 04/03/2018    Formatting of this note might be different from the original.  Added automatically from request for surgery [id number]    Medial epicondylitis of right elbow 07/17/2023    Medical marijuana use     Memory difficulties 08/06/2020    Memory loss     Migraines     Muscle weakness     legs    Obesity     Other psychoactive substance abuse with unspecified psychoactive substance-induced disorder (HCC) 05/06/2024    Overactive bladder     Polysubstance abuse (HCC) 09/29/2023    Potential for cognitive impairment 06/17/2021    Primary osteoarthritis of both knees 08/08/2018    Pulmonary emboli (HCC)     Restrictive lung disease " 02/01/2017    Last Assessment & Plan:   Formatting of this note might be different from the original.  I reviewed this problem throughout the rest of the note. Please refer to the other assessments for details.    Risk for falls     Sjogren's disease (HCC)     Sleep apnea     Stress incontinence     Suicidal deliberate poisoning (HCC) 07/13/2022    Suicidal ideations     Teeth missing     Toxic encephalopathy 11/08/2023    Tremor     per pt Tremors of Right Leg and both Arms    Visual impairment     Wears glasses    [2]   Past Surgical History:  Procedure Laterality Date    BREAST CYST EXCISION Right 1989    CARPAL TUNNEL RELEASE Left     CHOLECYSTECTOMY  05/2003    Laparoscopic    COLONOSCOPY      01/12/2009    DILATION AND CURETTAGE OF UTERUS      ELBOW SURGERY Left     x2. No hardware    ESOPHAGOGASTRODUODENOSCOPY      FOOT SURGERY Left     Plantar fasciotomy    HERNIA REPAIR      HYSTERECTOMY      laporoscopic, partial    KNEE ARTHROSCOPY Bilateral     OOPHORECTOMY Left 10/2015    GA CORRJ HLX VLGS BNCTY [facility] JOINT ARTHRODESIS Right 8/2/2024    Procedure: RIGHT FOOT LAPIDUS BUNIONECTOMY, 2ND HAMMERTOE REPAIR, SECOND METATARSAL OSTEOTOMY WITH SECOND PLANTAR PLATE REPAIR;  Surgeon: Kaz Bautista DPM;  Location: [facility]IN OR;  Service: Podiatry    GA GASTROCNEMIUS RECESSION Left 02/24/2021    Procedure: RECESSION GASTROC OPEN;  Surgeon: Nomi Yang DPM;  Location: [facility] MAIN OR;  Service: Podiatry    GA REMOVAL IMPLANT DEEP Right 5/14/2025    Procedure: FOOT REMOVAL OF PAINFUL HARDWARE, REVISION OF SCAR;  Surgeon: Kaz Bautista DPM;  Location: [facility];  Service: Podiatry    GA RPR UMBILICAL HRNA 5 YRS/> REDUCIBLE N/A 04/24/2019    Procedure: REPAIR HERNIA UMBILICAL LAPAROSCOPIC W/ ROBOTICS;  Surgeon: Anahi Colon MD;  Location: [facility] Main [facility];  Service: General    GA SPHINCTEROTOMY ANAL DIVISION SPHINCTER SPX N/A 08/31/2018    Procedure: EUA, LEFT PARTIAL INTERNAL SPHINCTEROTOMY;  Surgeon:  Tien Reyes MD;  Location:  MAIN OR;  Service: Colorectal    IL TNOT ELBOW LATERAL/MEDIAL DEBRIDE OPEN TDN RPR Left 2022    Procedure: RELEASE EPICONDYLAR ELBOW MEDIAL;  Surgeon: Kyree Winkler MD;  Location: [facility] OR;  Service: Orthopedics    REDUCTION MAMMAPLASTY Bilateral 1999    REPAIR LACERATION Left     left hand-2009    REPLACEMENT TOTAL KNEE Right     ROTATOR CUFF REPAIR Left     TONSILECTOMY AND ADNOIDECTOMY      US GUIDED MSK PROCEDURE  2021    VASCULAR SURGERY      VEIN LIGATION Bilateral     WISDOM TOOTH EXTRACTION     [3]   Social History  Tobacco Use    Smoking status: Former     Current packs/day: 0.00     Average packs/day: 2.0 packs/day for 33.0 years (66.0 ttl pk-yrs)     Types: Cigarettes     Start date: 1985     Quit date: 2018     Years since quittin.4    Smokeless tobacco: Never    Tobacco comments:     Started at age 16.   Vaping Use    Vaping status: Some Days    Substances: THC   Substance and Sexual Activity    Alcohol use: Not Currently     Comment: Recovering alcoholic    Drug use: Yes     Types: Marijuana     Comment: Former meth use, medicinal marijuana(medical card)   [4]   Family History  Problem Relation Name Age of Onset    Kidney cancer Mother anastacio     Diabetes Mother anastacio     Depression Mother anastacio     Stroke Mother anastacio     Arthritis Mother anastacio     Cancer Mother anastacio     Psychiatric Illness Mother anastacio     No Known Problems Father      No Known Problems Sister      No Known Problems Maternal Grandmother      No Known Problems Maternal Grandfather      No Known Problems Paternal Grandmother      No Known Problems Paternal Grandfather      Colon cancer Maternal Uncle      Colon cancer Maternal Uncle      Colon cancer Paternal Uncle      Colon cancer Family fx     Alcohol abuse Sister bharti     Asthma Sister bharti

## 2025-06-22 NOTE — ED PROVIDER NOTES
Time reflects when diagnosis was documented in both MDM as applicable and the Disposition within this note       Time User Action Codes Description Comment    6/22/2025  6:11 AM Khai Baig Add [L03.90] Cellulitis     6/22/2025  6:31 AM Nomi Cintron Add [L03.116,  L03.115] Bilateral lower leg cellulitis     6/22/2025  6:31 AM SaidaNomi walls S Modify [L03.116,  L03.115] Bilateral lower leg cellulitis     6/22/2025  6:31 AM SaidaNomi walls S Remove [L03.90] Cellulitis     6/22/2025  6:31 AM SaidaNomi walls S Add [R26.2] Ambulatory dysfunction     6/22/2025  6:33 AM SaidaNomi walls S Add [R79.1] Subtherapeutic international normalized ratio (INR)           ED Disposition       ED Disposition   Admit    Condition   Stable    Date/Time   Sun Jun 22, 2025  6:32 AM    Comment   Case was discussed with ERIKA Fuller and the patient's admission status was agreed to be Admission Status: observation status to the service of Dr. Francisca Masters .               Assessment & Plan       Medical Decision Making  54-year-old female well known to the network, presented this morning due to increased swelling as well as redness of the bilateral lower extremities. Concern for cellulitis, however differential diagnosis includes but is not limited to osteomyelitis, erysipelas. Had recent ultrasound bilaterally and is on Xarelto negative for DVT. CRP is elevated, does not meet SIRS criteria however erythema is greater than 50% of left lower extremity and is also circumferentially covering entirety of lower right lower extremity. X-rays do not show acute concern for osteomyelitis. Treating with vancomycin as well as cefepime given patient's history as well as recent admission.  Discussed with internal medicine service who accepted the patient for further evaluation and management.    Amount and/or Complexity of Data Reviewed  Labs: ordered.  Radiology: ordered and independent interpretation  performed.    Risk  Prescription drug management.  Decision regarding hospitalization.             Medications   vancomycin (VANCOCIN) 2,000 mg in sodium chloride 0.9 % 500 mL IVPB (has no administration in time range)   cefepime (MAXIPIME) 2 g/50 mL dextrose IVPB (has no administration in time range)   acetaminophen (Ofirmev) injection 1,000 mg (1,000 mg Intravenous New Bag 6/22/25 0265)       ED Risk Strat Scores                    No data recorded                            History of Present Illness       Chief Complaint   Patient presents with    Leg Swelling     Pt arrives via EMS from home for increased pain and swelling in b/l lower extremities.        Past Medical History[1]   Past Surgical History[2]   Family History[3]   Social History[4]   E-Cigarette/Vaping    E-Cigarette Use Current Some Day User     Comments medical THC       E-Cigarette/Vaping Substances    Nicotine No     THC Yes     CBD No     Flavoring No     Other No     Unknown No       I have reviewed and agree with the history as documented.     (Blanca Mason) Blanca Mason is a 54 y.o. female     They presented to the emergency department on June 22, 2025. Patient presents with:  Leg Swelling: Pt arrives via EMS from home for increased pain and swelling in b/l lower extremities.   .    The patient states that she has been history of CHF, notes that she is on 20 mg of Lasix, however over the past day or so she has been experiencing worsening leg swelling and significant redness.  Patient notes that she did have recent surgery on her right foot and has intact wound covering with Steri-Strips.  Patient denies any discomfort near the incision stating that the majority of her discomfort comes from her bilateral lower extremities as well as her right hip.  Patient denies any trauma. Patient denies fever, chills, chest pain, difficulty breathing, abdominal pain, change in bowel habits, change in urination, nausea, vomiting, or any other  complaint at this time.              Review of Systems   Constitutional:  Negative for chills and fever.   HENT:  Negative for ear pain and sore throat.    Eyes:  Negative for pain and visual disturbance.   Respiratory:  Negative for cough and shortness of breath.    Cardiovascular:  Positive for leg swelling. Negative for chest pain and palpitations.   Gastrointestinal:  Negative for abdominal pain and vomiting.   Genitourinary:  Negative for dysuria and hematuria.   Musculoskeletal:  Negative for arthralgias and back pain.   Skin:  Positive for rash. Negative for color change.   Neurological:  Negative for seizures and syncope.   All other systems reviewed and are negative.          Objective       ED Triage Vitals [06/22/25 0350]   Temperature Pulse Blood Pressure Respirations SpO2 Patient Position - Orthostatic VS   98 °F (36.7 °C) 88 152/78 20 98 % --      Temp Source Heart Rate Source BP Location FiO2 (%) Pain Score    Oral Monitor -- -- 7      Vitals      Date and Time Temp Pulse SpO2 Resp BP Pain Score FACES Pain Rating User   06/22/25 0350 98 °F (36.7 °C) 88 98 % 20 152/78 7 -- CH            Physical Exam  Vitals and nursing note reviewed.   Constitutional:       General: She is in acute distress (Mild).      Appearance: Normal appearance. She is obese.   HENT:      Head: Normocephalic and atraumatic.      Right Ear: External ear normal.      Left Ear: External ear normal.      Nose: Nose normal.      Mouth/Throat:      Mouth: Mucous membranes are moist.     Eyes:      Conjunctiva/sclera: Conjunctivae normal.       Cardiovascular:      Rate and Rhythm: Normal rate and regular rhythm.   Pulmonary:      Effort: Pulmonary effort is normal. No respiratory distress.      Breath sounds: Normal breath sounds.   Abdominal:      General: Abdomen is flat. Bowel sounds are normal.      Tenderness: There is no abdominal tenderness. There is no guarding or rebound.     Musculoskeletal:         General: Normal range of  motion.      Cervical back: Normal range of motion.      Right lower leg: Edema (+1) present.      Left lower leg: Edema (+1) present.     Skin:     General: Skin is warm and dry.      Capillary Refill: Capillary refill takes less than 2 seconds.      Findings: Erythema (Significant surrounding circumferential erythema bilateral lower extremities, left extends past knee proximally) present.     Neurological:      Mental Status: She is alert. Mental status is at baseline.     Psychiatric:         Mood and Affect: Mood normal.                     Results Reviewed       Procedure Component Value Units Date/Time    Blood culture [[id number]] Collected: 06/22/25 0509    Lab Status: In process Specimen: Blood from Arm, Right Updated: 06/22/25 0513    C-reactive protein [[id number]]  (Abnormal) Collected: 06/22/25 0349    Lab Status: Final result Specimen: Blood from Arm, Right Updated: 06/22/25 0453     CRP 36.3 mg/L     APTT [[id number]]  (Abnormal) Collected: 06/22/25 0349    Lab Status: Final result Specimen: Blood from Arm, Right Updated: 06/22/25 0449     PTT 38 seconds     Narrative:      Verified by repeat analysis.    Protime-INR [[id number]]  (Abnormal) Collected: 06/22/25 0349    Lab Status: Final result Specimen: Blood from Arm, Right Updated: 06/22/25 0449     Protime 19.1 seconds      INR 1.53    Narrative:      INR Therapeutic Range    Indication                                             INR Range      Atrial Fibrillation                                               2.0-3.0  Hypercoagulable State                                    2.0.2.3  Left Ventricular Asist Device                            2.0-3.0  Mechanical Heart Valve                                  -    Aortic(with afib, MI, embolism, HF, LA enlargement,    and/or coagulopathy)                                     2.0-3.0 (2.5-3.5)     Mitral                                                             2.5-3.5  Prosthetic/Bioprosthetic Heart Valve                2.0-3.0  Venous thromboembolism (VTE: VT, PE        2.0-3.0    Blood culture [[id number]]     Lab Status: No result Specimen: Blood     Comprehensive metabolic panel [[id number]]  (Abnormal) Collected: 06/22/25 0349    Lab Status: Final result Specimen: Blood from Arm, Right Updated: 06/22/25 0414     Sodium 141 mmol/L      Potassium 3.6 mmol/L      Chloride 107 mmol/L      CO2 25 mmol/L      ANION GAP 9 mmol/L      BUN 8 mg/dL      Creatinine 0.58 mg/dL      Glucose 97 mg/dL      Calcium 9.0 mg/dL      AST 14 U/L      ALT 10 U/L      Alkaline Phosphatase 70 U/L      Total Protein 6.7 g/dL      Albumin 3.6 g/dL      Total Bilirubin 0.50 mg/dL      eGFR 105 ml/min/1.73sq m     Narrative:      National Kid[employer] Foundation guidelines for Chronic Kidney Disease (CKD):     Stage 1 with normal or high GFR (GFR > 90 mL/min/1.73 square meters)    Stage 2 Mild CKD (GFR = 60-89 mL/min/1.73 square meters)    Stage 3A Moderate CKD (GFR = 45-59 mL/min/1.73 square meters)    Stage 3B Moderate CKD (GFR = 30-44 mL/min/1.73 square meters)    Stage 4 Severe CKD (GFR = 15-29 mL/min/1.73 square meters)    Stage 5 End Stage CKD (GFR <15 mL/min/1.73 square meters)  Note: GFR calculation is accurate only with a steady state creatinine    CBC and differential [[id number]]  (Abnormal) Collected: 06/22/25 0349    Lab Status: Final result Specimen: Blood from Arm, Right Updated: 06/22/25 0405     WBC 9.03 Thousand/uL      RBC 4.45 Million/uL      Hemoglobin 12.6 g/dL      Hematocrit 39.3 %      MCV 88 fL      MCH 28.3 pg      MCHC 32.1 g/dL      RDW 16.4 %      MPV 9.8 fL      Platelets 268 Thousands/uL      nRBC 0 /100 WBCs      Segmented % 58 %      Immature Grans % 1 %      Lymphocytes % 28 %      Monocytes % 12 %      Eosinophils Relative 1 %      Basophils Relative 0 %      Absolute Neutrophils 5.19 Thousands/µL      Absolute Immature Grans 0.06 Thousand/uL      Absolute Lymphocytes 2.55 Thousands/µL      Absolute Monocytes  1.07 Thousand/µL      Eosinophils Absolute 0.12 Thousand/µL      Basophils Absolute 0.04 Thousands/µL             XR hip/pelv 2-3 vws right if performed   ED Interpretation by Khai Baig MD (06/22 0657)   No acute fracture or dislocation. Interpreted independently by myself.      XR tibia fibula 2 views RIGHT   ED Interpretation by Khai Baig MD (06/22 0658)   No acute fracture or dislocation. Interpreted independently by myself.      XR tibia fibula 2 views LEFT   ED Interpretation by Khai Baig MD (06/22 0658)   No acute fracture or dislocation. Interpreted independently by myself.          Procedures    ED Medication and Procedure Management   Prior to Admission Medications   Prescriptions Last Dose Informant Patient Reported? Taking?   Aciphex 20 MG tablet  Self Yes No   Sig: Take by mouth as needed   Cholecalciferol (Vitamin D3) 125 MCG (5000 UT) capsule  Self No No   Sig: Take 1 capsule (5,000 Units total) by mouth daily   Invega Trinza 819 MG/2.63ML DWIGHT   Yes No   OXcarbazepine (TRILEPTAL) 150 mg tablet  Self No No   Sig: Take 3 tablets (450 mg total) by mouth every 12 (twelve) hours   albuterol (PROVENTIL HFA,VENTOLIN HFA) 90 mcg/act inhaler   Yes No   Sig: Inhale 2 puffs every 6 (six) hours as needed for wheezing   atoMOXetine (STRATTERA) 60 mg capsule  Self Yes No   Sig: Take 60 mg by mouth in the morning.   atorvastatin (LIPITOR) 80 mg tablet   No No   Sig: Take 1 tablet (80 mg total) by mouth daily   buPROPion (WELLBUTRIN XL) 150 mg 24 hr tablet  Self Yes No   buPROPion (WELLBUTRIN XL) 300 mg 24 hr tablet   Yes No   cloNIDine (CATAPRES) 0.3 mg tablet   No No   Sig: Take 1 tablet (0.3 mg total) by mouth 2 (two) times a day   cyanocobalamin (VITAMIN B-12) 1000 MCG tablet  Self No No   Sig: Take 1 tablet (1,000 mcg total) by mouth daily   furosemide (LASIX) 20 mg tablet   No No   Sig: TAKE 1 TABLET BY MOUTH DAILY   levocetirizine (XYZAL) 5 MG tablet  Self No No   Sig: TAKE ONE  TABLET BY MOUTH IN THE EVENING DAILY   levothyroxine 125 mcg tablet   No No   Sig: TAKE 1 TABLET BY MOUTH DAILY IN THE EARLY MORNING   losartan (COZAAR) 100 MG tablet   No No   Sig: TAKE 1 TABLET BY MOUTH DAILY   metFORMIN (GLUCOPHAGE-XR) 750 mg 24 hr tablet   No No   Sig: TAKE 1 TABLET BY MOUTH DAILY WITH BREAKFAST   naltrexone (REVIA) 50 mg tablet  Self No No   Sig: TAKE 1 TABLET BY MOUTH DAILY   naproxen (Naprosyn) 500 mg tablet  Self No No   Sig: Take 1 tablet (500 mg total) by mouth 2 (two) times a day with meals Take with food.   oxybutynin (DITROPAN) 5 mg tablet  Self No No   Sig: Take 1 tablet (5 mg total) by mouth 2 (two) times a day   pantoprazole (PROTONIX) 40 mg tablet  Self No No   Sig: Take 1 tablet (40 mg total) by mouth daily in the early morning   pilocarpine (SALAGEN) 5 mg tablet  Self No No   Sig: TAKE 1 TABLET (5 MG TOTAL) BY MOUTH THREE (THREE) TIMES A DAY   rOPINIRole (REQUIP) 1 mg tablet  Self Yes No   Sig: Take by mouth in the morning.   rivaroxaban (Xarelto) 20 mg tablet   No No   Sig: Take 1 tablet (20 mg total) by mouth daily with breakfast   topiramate (TOPAMAX) 50 MG tablet   No No   Sig: Take 3 tablets (150 mg total) by mouth 2 (two) times a day for 1 day, THEN 2 tablets (100 mg total) 2 (two) times a day for 1 day, THEN 1.5 tablets (75 mg total) 2 (two) times a day for 1 day, THEN 1 tablet (50 mg total) 2 (two) times a day for 1 day, THEN 0.5 tablets (25 mg total) 2 (two) times a day for 1 day, THEN 0.5 tablets (25 mg total) daily for 1 day.   traZODone (DESYREL) 50 mg tablet   No No   Sig: Take 1 tablet (50 mg total) by mouth daily at bedtime as needed for sleep (insomnia)   trospium chloride (SANCTURA) 20 mg tablet  Self Yes No   Sig: Take 20 mg by mouth in the morning and 20 mg in the evening.      Facility-Administered Medications: None     Patient's Medications   Discharge Prescriptions    No medications on file     No discharge procedures on file.  ED SEPSIS DOCUMENTATION   Time  reflects when diagnosis was documented in both MDM as applicable and the Disposition within this note       Time User Action Codes Description Comment    6/22/2025  6:11 AM Khai Baig Add [L03.90] Cellulitis     6/22/2025  6:31 AM SaidaNomi walls S Add [L03.116,  L03.115] Bilateral lower leg cellulitis     6/22/2025  6:31 AM Saida, Nomi S Modify [L03.116,  L03.115] Bilateral lower leg cellulitis     6/22/2025  6:31 AM SaidaNomi walls S Remove [L03.90] Cellulitis     6/22/2025  6:31 AM Saida, Nomi S Add [R26.2] Ambulatory dysfunction     6/22/2025  6:33 AM SaidaNomi walls S Add [R79.1] Subtherapeutic international normalized ratio (INR)            Initial Sepsis Screening       Row Name 06/22/25 0624                Is the patient's history suggestive of a new or worsening infection? Yes (Proceed)  -TD        Suspected source of infection soft tissue  -TD        Indicate SIRS criteria --        Are two or more of the above signs & symptoms of infection both present and new to the patient? No  -TD                  User Key  (r) = Recorded By, (t) = Taken By, (c) = Cosigned By      Initials Name Provider Type    TD Khai Baig MD Physician                         Khai Baig MD  06/22/25 0648       Khai Baig MD  06/22/25 0657         [1]   Past Medical History:  Diagnosis Date    Acute deep vein thrombosis of lower leg, left (HCC) 10/16/2023    Acute heart failure, unspecified heart failure type (HCC) 05/06/2024    Anxiety     Arthritis     oa cassandra knees    Asthma     good control- no medications    Yan's esophagus     Bipolar affective disorder (HCC)     Cannabis abuse with unspecified 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HCC)     Chronic pain disorder     lumbar    Contusion of elbow 12/21/2021    COPD (chronic obstructive pulmonary disease) (HCC)     CPAP (continuous positive airway pressure) dependence     DDD (degenerative disc disease), lumbar 04/09/2015     "Degenerative disc disease at L5-S1 level     Deliberate self-cutting     9/15/22per pt has not done recently-- does see a therapist and psychiatrist regularly    Depression 09/16/2008    Diarrhea 01/06/2022    Disease of thyroid gland     hypo    MARTINEZ (dyspnea on exertion)     per pt \"has had this with exertion and not new\"    Drug overdose 10/28/2008    suicide attempt and again drug overdose 7/2022--[facility]Medical floor and than transferred to [facility]    Dysphagia     Dyspnea     Ecchymosis 01/06/2022    Edema     BLE    Elevated troponin 09/02/2023    Family history of blood clots     GERD (gastroesophageal reflux disease)     Grief 11/11/2023    Headache(784.0)     Headache, tension-type     Hemorrhage of rectum and anus 10/02/2017    Formatting of this note might be different from the original.  Added automatically from request for surgery [id number]    High blood pressure     History of COVID-19 12/30/2020    Symptoms started on 12/30/2020 and then got worse.  Today she is feeling a little bit better.  She is a candidate for monoclonal antibody infusion and set for 1/6/21 @ 1pm at [facility]. 01/07/21 - telemedicine -     History of kidney stones     Hyperlipidemia     Hypertension     Ingrown toenail 12/02/2023    Intentional overdose (HCC) 09/27/2023    Intentional self-harm by unspecified sharp object, sequela (HCC) 02/09/2023    Irritable bowel syndrome     Knee pain, bilateral     Especially right    Knee pain, right 02/23/2022    Medial epicondylitis of elbow, left 04/03/2018    Formatting of this note might be different from the original.  Added automatically from request for surgery [id number]    Medial epicondylitis of right elbow 07/17/2023    Medical marijuana use     Memory difficulties 08/06/2020    Memory loss     Migraines     Muscle weakness     legs    Obesity     Other psychoactive substance abuse with unspecified psychoactive substance-induced disorder (HCC) 05/06/2024    Overactive bladder  "    Polysubstance abuse (HCC) 09/29/2023    Potential for cognitive impairment 06/17/2021    Primary osteoarthritis of both knees 08/08/2018    Pulmonary emboli (HCC)     Restrictive lung disease 02/01/2017    Last Assessment & Plan:   Formatting of this note might be different from the original.  I reviewed this problem throughout the rest of the note. Please refer to the other assessments for details.    Risk for falls     Sjogren's disease (HCC)     Sleep apnea     Stress incontinence     Suicidal deliberate poisoning (HCC) 07/13/2022    Suicidal ideations     Teeth missing     Toxic encephalopathy 11/08/2023    Tremor     per pt Tremors of Right Leg and both Arms    Visual impairment     Wears glasses    [2]   Past Surgical History:  Procedure Laterality Date    BREAST CYST EXCISION Right 1989    CARPAL TUNNEL RELEASE Left     CHOLECYSTECTOMY  05/2003    Laparoscopic    COLONOSCOPY      01/12/2009    DILATION AND CURETTAGE OF UTERUS      ELBOW SURGERY Left     x2. No hardware    ESOPHAGOGASTRODUODENOSCOPY      FOOT SURGERY Left     Plantar fasciotomy    HERNIA REPAIR      HYSTERECTOMY      laporoscopic, partial    KNEE ARTHROSCOPY Bilateral     OOPHORECTOMY Left 10/2015    MA CORRJ HLX VLGS BNCTY SESMDC JOINT ARTHRODESIS Right 8/2/2024    Procedure: RIGHT FOOT LAPIDUS BUNIONECTOMY, 2ND HAMMERTOE REPAIR, SECOND METATARSAL OSTEOTOMY WITH SECOND PLANTAR PLATE REPAIR;  Surgeon: Kaz Bautista DPM;  Location:  MAIN OR;  Service: Podiatry    MA GASTROCNEMIUS RECESSION Left 02/24/2021    Procedure: RECESSION GASTROC OPEN;  Surgeon: Nomi Yang DPM;  Location:  MAIN OR;  Service: Podiatry    MA REMOVAL IMPLANT DEEP Right 5/14/2025    Procedure: FOOT REMOVAL OF PAINFUL HARDWARE, REVISION OF SCAR;  Surgeon: Kaz Bautista DPM;  Location:  MAIN OR;  Service: Podiatry    MA RPR UMBILICAL HRNA 5 YRS/> REDUCIBLE N/A 04/24/2019    Procedure: REPAIR HERNIA UMBILICAL LAPAROSCOPIC W/ ROBOTICS;   Surgeon: Anahi Colon MD;  Location: [facility];  Service: General    MD SPHINCTEROTOMY ANAL DIVISION SPHINCTER SPX N/A 2018    Procedure: EUA, LEFT PARTIAL INTERNAL SPHINCTEROTOMY;  Surgeon: Tien Reyes MD;  Location:  MAIN OR;  Service: Colorectal    MD TNOT ELBOW LATERAL/MEDIAL DEBRIDE OPEN TDN RPR Left 2022    Procedure: RELEASE EPICONDYLAR ELBOW MEDIAL;  Surgeon: Kyree Winkler MD;  [facility];  Service: Orthopedics    REDUCTION MAMMAPLASTY Bilateral 1999    REPAIR LACERATION Left     left hand-2009    REPLACEMENT TOTAL KNEE Right     ROTATOR CUFF REPAIR Left     TONSILECTOMY AND ADNOIDECTOMY      US GUIDED MSK PROCEDURE  2021    VASCULAR SURGERY      VEIN LIGATION Bilateral     WISDOM TOOTH EXTRACTION     [3]   Family History  Problem Relation Name Age of Onset    Kidney cancer Mother anastacio     Diabetes Mother anastacio     Depression Mother anastacio     Stroke Mother anastacio     Arthritis Mother anastacio     Cancer Mother anastacio     Psychiatric Illness Mother anastacio     No Known Problems Father      No Known Problems Sister      No Known Problems Maternal Grandmother      No Known Problems Maternal Grandfather      No Known Problems Paternal Grandmother      No Known Problems Paternal Grandfather      Colon cancer Maternal Uncle      Colon cancer Maternal Uncle      Colon cancer Paternal Uncle      Colon cancer Family fx     Alcohol abuse Sister bharti     Asthma Sister bharti    [4]   Social History  Tobacco Use    Smoking status: Former     Current packs/day: 0.00     Average packs/day: 2.0 packs/day for 33.0 years (66.0 ttl pk-yrs)     Types: Cigarettes     Start date: 1985     Quit date: 2018     Years since quittin.4    Smokeless tobacco: Never    Tobacco comments:     Started at age 16.   Vaping Use    Vaping status: Some Days    Substances: THC   Substance Use Topics    Alcohol use: Not Currently     Comment: Recovering alcoholic    Drug use: Yes      Types: Marijuana     Comment: Former meth use, medicinal marijuana(medical card)        Khai Baig MD  06/22/25 0633

## 2025-06-23 PROBLEM — E87.8 ELECTROLYTE ABNORMALITY: Status: ACTIVE | Noted: 2025-06-23

## 2025-06-23 LAB
ANION GAP SERPL CALCULATED.3IONS-SCNC: 8 MMOL/L (ref 4–13)
BASOPHILS # BLD AUTO: 0.04 THOUSANDS/ÂΜL (ref 0–0.1)
BASOPHILS NFR BLD AUTO: 1 % (ref 0–1)
BUN SERPL-MCNC: 6 MG/DL (ref 5–25)
CALCIUM SERPL-MCNC: 8.5 MG/DL (ref 8.4–10.2)
CHLORIDE SERPL-SCNC: 107 MMOL/L (ref 96–108)
CO2 SERPL-SCNC: 24 MMOL/L (ref 21–32)
CREAT SERPL-MCNC: 0.55 MG/DL (ref 0.6–1.3)
EOSINOPHIL # BLD AUTO: 0.14 THOUSAND/ÂΜL (ref 0–0.61)
EOSINOPHIL NFR BLD AUTO: 2 % (ref 0–6)
ERYTHROCYTE [DISTWIDTH] IN BLOOD BY AUTOMATED COUNT: 16.4 % (ref 11.6–15.1)
GFR SERPL CREATININE-BSD FRML MDRD: 107 ML/MIN/1.73SQ M
GLUCOSE SERPL-MCNC: 108 MG/DL (ref 65–140)
GLUCOSE SERPL-MCNC: 111 MG/DL (ref 65–140)
GLUCOSE SERPL-MCNC: 122 MG/DL (ref 65–140)
GLUCOSE SERPL-MCNC: 123 MG/DL (ref 65–140)
GLUCOSE SERPL-MCNC: 126 MG/DL (ref 65–140)
HCT VFR BLD AUTO: 39.1 % (ref 34.8–46.1)
HGB BLD-MCNC: 12.2 G/DL (ref 11.5–15.4)
IMM GRANULOCYTES # BLD AUTO: 0.05 THOUSAND/UL (ref 0–0.2)
IMM GRANULOCYTES NFR BLD AUTO: 1 % (ref 0–2)
LYMPHOCYTES # BLD AUTO: 1.9 THOUSANDS/ÂΜL (ref 0.6–4.47)
LYMPHOCYTES NFR BLD AUTO: 24 % (ref 14–44)
MAGNESIUM SERPL-MCNC: 1.6 MG/DL (ref 1.9–2.7)
MCH RBC QN AUTO: 28.4 PG (ref 26.8–34.3)
MCHC RBC AUTO-ENTMCNC: 31.2 G/DL (ref 31.4–37.4)
MCV RBC AUTO: 91 FL (ref 82–98)
MONOCYTES # BLD AUTO: 0.95 THOUSAND/ÂΜL (ref 0.17–1.22)
MONOCYTES NFR BLD AUTO: 12 % (ref 4–12)
NEUTROPHILS # BLD AUTO: 4.71 THOUSANDS/ÂΜL (ref 1.85–7.62)
NEUTS SEG NFR BLD AUTO: 60 % (ref 43–75)
NRBC BLD AUTO-RTO: 0 /100 WBCS
PLATELET # BLD AUTO: 229 THOUSANDS/UL (ref 149–390)
PMV BLD AUTO: 10.9 FL (ref 8.9–12.7)
POTASSIUM SERPL-SCNC: 3.2 MMOL/L (ref 3.5–5.3)
RBC # BLD AUTO: 4.29 MILLION/UL (ref 3.81–5.12)
SODIUM SERPL-SCNC: 139 MMOL/L (ref 135–147)
WBC # BLD AUTO: 7.79 THOUSAND/UL (ref 4.31–10.16)

## 2025-06-23 PROCEDURE — 99232 SBSQ HOSP IP/OBS MODERATE 35: CPT | Performed by: PHYSICIAN ASSISTANT

## 2025-06-23 PROCEDURE — 82948 REAGENT STRIP/BLOOD GLUCOSE: CPT

## 2025-06-23 PROCEDURE — 80048 BASIC METABOLIC PNL TOTAL CA: CPT | Performed by: NURSE PRACTITIONER

## 2025-06-23 PROCEDURE — 83735 ASSAY OF MAGNESIUM: CPT | Performed by: NURSE PRACTITIONER

## 2025-06-23 PROCEDURE — 85025 COMPLETE CBC W/AUTO DIFF WBC: CPT | Performed by: NURSE PRACTITIONER

## 2025-06-23 PROCEDURE — 94760 N-INVAS EAR/PLS OXIMETRY 1: CPT

## 2025-06-23 RX ORDER — POTASSIUM CHLORIDE 1500 MG/1
40 TABLET, EXTENDED RELEASE ORAL ONCE
Status: COMPLETED | OUTPATIENT
Start: 2025-06-23 | End: 2025-06-23

## 2025-06-23 RX ORDER — DIPHENHYDRAMINE HYDROCHLORIDE 50 MG/ML
25 INJECTION, SOLUTION INTRAMUSCULAR; INTRAVENOUS ONCE
Status: COMPLETED | OUTPATIENT
Start: 2025-06-23 | End: 2025-06-23

## 2025-06-23 RX ORDER — FUROSEMIDE 20 MG/1
20 TABLET ORAL DAILY
Status: DISCONTINUED | OUTPATIENT
Start: 2025-06-23 | End: 2025-06-24 | Stop reason: HOSPADM

## 2025-06-23 RX ORDER — MAGNESIUM SULFATE HEPTAHYDRATE 40 MG/ML
2 INJECTION, SOLUTION INTRAVENOUS ONCE
Status: COMPLETED | OUTPATIENT
Start: 2025-06-23 | End: 2025-06-23

## 2025-06-23 RX ADMIN — CEFAZOLIN SODIUM 2000 MG: 2 SOLUTION INTRAVENOUS at 03:10

## 2025-06-23 RX ADMIN — CEFAZOLIN SODIUM 2000 MG: 2 SOLUTION INTRAVENOUS at 19:03

## 2025-06-23 RX ADMIN — POTASSIUM CHLORIDE 40 MEQ: 1500 TABLET, EXTENDED RELEASE ORAL at 08:25

## 2025-06-23 RX ADMIN — OXYBUTYNIN CHLORIDE 5 MG: 5 TABLET ORAL at 08:26

## 2025-06-23 RX ADMIN — OXCARBAZEPINE 450 MG: 150 TABLET, FILM COATED ORAL at 08:32

## 2025-06-23 RX ADMIN — FUROSEMIDE 20 MG: 20 TABLET ORAL at 11:28

## 2025-06-23 RX ADMIN — CYANOCOBALAMIN TAB 500 MCG 1000 MCG: 500 TAB at 08:25

## 2025-06-23 RX ADMIN — ROPINIROLE HYDROCHLORIDE 1 MG: 1 TABLET, FILM COATED ORAL at 08:32

## 2025-06-23 RX ADMIN — ATORVASTATIN CALCIUM 80 MG: 40 TABLET, FILM COATED ORAL at 08:26

## 2025-06-23 RX ADMIN — PILOCARPINE HYDROCHLORIDE 5 MG: 5 TABLET, FILM COATED ORAL at 16:57

## 2025-06-23 RX ADMIN — MAGNESIUM SULFATE HEPTAHYDRATE 2 G: 40 INJECTION, SOLUTION INTRAVENOUS at 08:36

## 2025-06-23 RX ADMIN — ATOMOXETINE 60 MG: 60 CAPSULE ORAL at 08:32

## 2025-06-23 RX ADMIN — LOSARTAN POTASSIUM 100 MG: 50 TABLET, FILM COATED ORAL at 08:25

## 2025-06-23 RX ADMIN — OXCARBAZEPINE 450 MG: 150 TABLET, FILM COATED ORAL at 21:32

## 2025-06-23 RX ADMIN — PILOCARPINE HYDROCHLORIDE 5 MG: 5 TABLET, FILM COATED ORAL at 08:31

## 2025-06-23 RX ADMIN — CEFAZOLIN SODIUM 2000 MG: 2 SOLUTION INTRAVENOUS at 11:17

## 2025-06-23 RX ADMIN — LEVOTHYROXINE SODIUM 125 MCG: 0.12 TABLET ORAL at 05:19

## 2025-06-23 RX ADMIN — PANTOPRAZOLE SODIUM 40 MG: 40 TABLET, DELAYED RELEASE ORAL at 05:18

## 2025-06-23 RX ADMIN — Medication 1000 UNITS: at 08:26

## 2025-06-23 RX ADMIN — NALTREXONE HYDROCHLORIDE 50 MG: 50 TABLET, FILM COATED ORAL at 08:32

## 2025-06-23 RX ADMIN — BUPROPION HYDROCHLORIDE 450 MG: 150 TABLET, EXTENDED RELEASE ORAL at 08:26

## 2025-06-23 RX ADMIN — OXYBUTYNIN CHLORIDE 5 MG: 5 TABLET ORAL at 19:12

## 2025-06-23 RX ADMIN — CLONIDINE HYDROCHLORIDE 0.3 MG: 0.1 TABLET ORAL at 19:08

## 2025-06-23 RX ADMIN — DIPHENHYDRAMINE HYDROCHLORIDE 25 MG: 50 INJECTION, SOLUTION INTRAMUSCULAR; INTRAVENOUS at 11:27

## 2025-06-23 RX ADMIN — RIVAROXABAN 20 MG: 20 TABLET, FILM COATED ORAL at 08:26

## 2025-06-23 RX ADMIN — LORATADINE 10 MG: 10 TABLET ORAL at 08:26

## 2025-06-23 RX ADMIN — CLONIDINE HYDROCHLORIDE 0.3 MG: 0.1 TABLET ORAL at 08:25

## 2025-06-23 RX ADMIN — PILOCARPINE HYDROCHLORIDE 5 MG: 5 TABLET, FILM COATED ORAL at 21:32

## 2025-06-23 NOTE — UTILIZATION REVIEW
Initial Clinical Review  OBSERVATION 6/22/25 @ 0632 CONVERTED TO INPATIENT 6/23/25@0929 DUE TO CELLULITIS REQUIRING IV AB.    Admission: Date/Time/Statement:   Admission Orders (From admission, onward)       Ordered        06/23/25 0929  INPATIENT ADMISSION  Once            06/22/25 0632  Place in Observation  Once                          Orders Placed This Encounter   Procedures    Place in Observation     Standing Status:   Standing     Number of Occurrences:   1     Level of Care:   Med Surg [16]    INPATIENT ADMISSION     Standing Status:   Standing     Number of Occurrences:   1     Level of Care:   Med Surg [16]     Estimated length of stay:   More than 2 Midnights     Certification:   I certify that inpatient services are medically necessary for this patient for a duration of greater than two midnights. See H&P and MD Progress Notes for additional information about the patient's course of treatment.     ED Arrival Information       Expected   -    Arrival   6/22/2025 03:07    Acuity   Urgent              Means of arrival   Ambulance    Escorted by   [facility]    Service   Hospitalist    Admission type   Emergency              Arrival complaint   ems leg swelling/pain             Chief Complaint   Patient presents with    Leg Swelling     Pt arrives via EMS from home for increased pain and swelling in b/l lower extremities.        Initial Presentation: 54 y.o. female to the ED from home via EMS with complaints of redness, swelling bilateral lower extremities.  Admitted under observation then converted to inpatient for cellulitis lower extremity.  H/O obesity, pre diabetes, PE/DVT on xarelto, lymphedema, hypothyroidism, gerd, htn, copd/asthma, migraine, borderline personality disorder, harvey on cpap.  Arrives with bilateral lower extremity swelling, redness. CRP 36.3. Hospitalized 6/4-6/6 treated with ancef for possible cellulitis. Discharged on oral antibiotics, but did not complete course. Tender lower  extremities with clear drainage.         Date: 6/23   Day 2:  Continue IV abx, IV lasix. Follow blood cultures.  Thinks that swelling has gone doen in legs.  +2 bilateral lower extremity pitting edema currently.     Date: 6/24  Day 3: Has surpassed a 2nd midnight with active treatments and services.Initially admitted for cellulitis lower extremity, bilateral lower extremity edema.  S/P IV lasix, IV abx.         ED Treatment-Medication Administration from 06/22/2025 0307 to 06/22/2025 2214         Date/Time Order Dose Route Action     06/22/2025 0457 acetaminophen (Ofirmev) injection 1,000 mg 1,000 mg Intravenous New Bag     06/22/2025 0953 vancomycin (VANCOCIN) 2,000 mg in sodium chloride 0.9 % 500 mL IVPB 2,000 mg Intravenous New Bag     06/22/2025 0753 cefepime (MAXIPIME) 2 g/50 mL dextrose IVPB 2,000 mg Intravenous New Bag     06/22/2025 0753 furosemide (LASIX) injection 40 mg 40 mg Intravenous Given     06/22/2025 1629 furosemide (LASIX) injection 40 mg 40 mg Intravenous Given     06/22/2025 1626 atoMOXetine (STRATTERA) capsule 60 mg 60 mg Oral Given     06/22/2025 1442 atorvastatin (LIPITOR) tablet 80 mg 80 mg Oral Given     06/22/2025 1621 buPROPion (WELLBUTRIN XL) 24 hr tablet 450 mg 450 mg Oral Given     06/22/2025 1442 Cholecalciferol (VITAMIN D3) tablet 1,000 Units 1,000 Units Oral Given     06/22/2025 1836 cloNIDine (CATAPRES) tablet 0.3 mg 0.3 mg Oral Given     06/22/2025 1623 cyanocobalamin (VITAMIN B-12) tablet 1,000 mcg 1,000 mcg Oral Given     06/22/2025 1441 loratadine (CLARITIN) tablet 10 mg 10 mg Oral Given     06/22/2025 1441 losartan (COZAAR) tablet 100 mg 100 mg Oral Given     06/22/2025 1618 naltrexone (REVIA) tablet 50 mg 50 mg Oral Given     06/22/2025 1628 OXcarbazepine (TRILEPTAL) tablet 450 mg 450 mg Oral Given     06/22/2025 1619 pilocarpine (SALAGEN) tablet 5 mg 5 mg Oral Given     06/22/2025 1616 rOPINIRole (REQUIP) tablet 1 mg 1 mg Oral Given     06/22/2025 1836 oxybutynin (DITROPAN)  tablet 5 mg 5 mg Oral Given     06/22/2025 1836 ceFAZolin (ANCEF) IVPB (premix in dextrose) 2,000 mg 50 mL 2,000 mg Intravenous New Bag            Scheduled Medications:  atoMOXetine, 60 mg, Oral, Daily  atorvastatin, 80 mg, Oral, Daily  buPROPion, 450 mg, Oral, Daily  cefazolin, 2,000 mg, Intravenous, Q8H  Cholecalciferol, 1,000 Units, Oral, Daily  cloNIDine, 0.3 mg, Oral, BID  cyanocobalamin, 1,000 mcg, Oral, Daily  insulin lispro, 1-5 Units, Subcutaneous, HS  insulin lispro, 2-12 Units, Subcutaneous, TID AC  levothyroxine, 125 mcg, Oral, Early Morning  loratadine, 10 mg, Oral, Daily  losartan, 100 mg, Oral, Daily  magnesium sulfate, 2 g, Intravenous, Once  naltrexone, 50 mg, Oral, Daily  OXcarbazepine, 450 mg, Oral, Q12H TASHA  oxybutynin, 5 mg, Oral, BID  pantoprazole, 40 mg, Oral, Early Morning  pilocarpine, 5 mg, Oral, TID  rivaroxaban, 20 mg, Oral, Daily With Breakfast  rOPINIRole, 1 mg, Oral, Daily      Continuous IV Infusions:     PRN Meds:  acetaminophen, 650 mg, Oral, Q6H PRN  albuterol, 2 puff, Inhalation, Q6H PRN  traZODone, 50 mg, Oral, HS PRN      ED Triage Vitals [06/22/25 0350]   Temperature Pulse Respirations Blood Pressure SpO2 Pain Score   98 °F (36.7 °C) 88 20 152/78 98 % 7     Weight (last 2 days) before discharge       Date/Time Weight    06/22/25 2222 135 (298.72)    06/22/25 0350 134 (295)            Vital Signs (last 3 days) before discharge       Date/Time Temp Pulse Resp BP MAP (mmHg) SpO2 O2 Device O2 Interface Device Patient Position - Orthostatic VS Pain    06/24/25 0724 98.1 °F (36.7 °C) 81 18 103/54 74 93 % None (Room air) -- Lying --    06/23/25 2300 98 °F (36.7 °C) 84 20 121/60 -- 95 % None (Room air) -- Lying --    06/23/25 2140 -- -- -- -- -- 95 % -- Face mask -- --    06/23/25 1908 -- -- -- 114/56 -- -- -- -- -- --    06/23/25 1500 97.8 °F (36.6 °C) 82 18 115/55 77 92 % None (Room air) -- -- --    06/23/25 0700 98.2 °F (36.8 °C) 85 18 126/58 84 -- -- -- Sitting --    06/22/25 9821  -- -- -- -- -- -- -- -- -- No Pain    06/22/25 2222 97.9 °F (36.6 °C) 91 18 147/67 97 91 % None (Room air) -- -- No Pain    06/22/25 1830 -- 107 18 162/81 112 94 % None (Room air) -- -- --    06/22/25 1800 -- 95 18 163/77 110 95 % -- -- -- --    06/22/25 1700 -- 90 -- 152/71 102 94 % -- -- -- --    06/22/25 1615 -- 93 18 153/87 112 96 % -- -- -- --    06/22/25 1332 -- 92 16 136/63 90 95 % None (Room air) -- Sitting --    06/22/25 1229 -- 86 18 124/75 -- 96 % None (Room air) -- Sitting --    06/22/25 1013 -- 88 18 128/86 -- 93 % None (Room air) -- Sitting --    06/22/25 0736 -- 88 16 104/55 77 94 % None (Room air) -- -- --    06/22/25 0729 -- 87 18 115/56 -- 98 % None (Room air) -- Lying --    06/22/25 0350 98 °F (36.7 °C) 88 20 152/78 -- 98 % None (Room air) -- -- 7              Pertinent Labs/Diagnostic Test Results:   Radiology:  XR hip/pelv 2-3 vws right if performed   ED Interpretation by Khai Baig MD (06/22 0657)   No acute fracture or dislocation. Interpreted independently by myself.      Final Interpretation by Brijesh Urrutia MD (06/23 1331)      No evidence of an acute osseous abnormality. Nonspecific diffuse subcutaneous edema.            Computerized Assisted Algorithm (CAA) may have been used to analyze all applicable images.               Workstation performed: [id number]         XR tibia fibula 2 views RIGHT   ED Interpretation by Khai Baig MD (06/22 0658)   No acute fracture or dislocation. Interpreted independently by myself.      Final Interpretation by Brijesh Urrutia MD (06/23 1331)      No evidence of an acute osseous abnormality. Nonspecific diffuse subcutaneous edema.            Computerized Assisted Algorithm (CAA) may have been used to analyze all applicable images.               Workstation performed: [id number]         XR tibia fibula 2 views LEFT   ED Interpretation by Khai Baig MD (06/22 0658)   No acute fracture or dislocation. Interpreted  independently by myself.      Final Interpretation by Chan Casillas MD (06/23 1341)      Diffuse subcutaneous edema without radiographic evidence of osteomyelitis at this time. .         Computerized Assisted Algorithm (CAA) may have been used to analyze all applicable images.               Resident: Luigi Castaneda I, the attending radiologist, have reviewed the images and agree with the final report above.      Workstation performed: [id number]           Cardiology:  No orders to display           Results from last 7 days   Lab Units 06/24/25 0447 06/23/25 0429 06/22/25 0349 06/18/25  1126   SODIUM mmol/L 138 139 141 141   POTASSIUM mmol/L 3.6 3.2* 3.6 3.9   CHLORIDE mmol/L 107 107 107 105   CO2 mmol/L 25 24 25 25   ANION GAP mmol/L 6 8 9 11   BUN mg/dL 6 6 8 6   CREATININE mg/dL 0.56* 0.55* 0.58* 0.63   EGFR ml/min/1.73sq m 106 107 105 102   CALCIUM mg/dL 8.3* 8.5 9.0 9.1   MAGNESIUM mg/dL 1.9 1.6*  --   --      Results from last 7 days   Lab Units 06/22/25 0349 06/18/25  1126   AST U/L 14 20   ALT U/L 10 17   ALK PHOS U/L 70 77   TOTAL PROTEIN g/dL 6.7 6.4   ALBUMIN g/dL 3.6 3.6   TOTAL BILIRUBIN mg/dL 0.50 0.31     Results from last 7 days   Lab Units 06/24/25  1102 06/24/25  0728 06/23/25 2023 06/23/25  1527 06/23/25  1112 06/23/25  0740 06/22/25  2237 06/22/25  1614   POC GLUCOSE mg/dl 121 130 126 123 108 122 100 105     Results from last 7 days   Lab Units 06/24/25  0447 06/23/25  0429 06/22/25  0349 06/18/25  1126   GLUCOSE RANDOM mg/dL 101 111 97 102             Results from last 7 days   Lab Units 06/22/25  0349 06/20/25  0717 06/18/25  2326   PROTIME seconds 19.1* 17.8* 58.3*   INR  1.53* 1.44* 6.73*   PTT seconds 38*  --  100*       Results from last 7 days   Lab Units 06/22/25  0349   CRP mg/L 36.3*     Results from last 7 days   Lab Units 06/22/25  0727 06/22/25  0509   BLOOD CULTURE  No Growth at 48 hrs. No Growth at 48 hrs.         Past Medical History[1]  Present on Admission:   Benign  essential hypertension   MAG (obstructive sleep apnea)   Substance abuse (HCC)   Bilateral leg edema   Schizoaffective disorder, bipolar type (HCC)      Admitting Diagnosis: Subtherapeutic international normalized ratio (INR) [R79.1]  Bilateral lower leg cellulitis [L03.116, L03.115]  Ambulatory dysfunction [R26.2]  Age/Sex: 54 y.o. female    Network Utilization Review Department  ATTENTION: Please call with any questions or concerns to [phone] and carefully listen to the prompts so that you are directed to the right person. All voicemails are confidential.   For Discharge needs, contact Care Management DC Support Team at [phone] opt. 2  Send all requests for admission clinical reviews, approved or denied determinations and any other requests to dedicated fax number below belonging to the campus where the patient is receiving treatment. List of dedicated fax numbers for the Facilities:  FACILITY NAME UR FAX NUMBER   ADMISSION DENIALS (Administrative/Medical Necessity) [phone]   DISCHARGE SUPPORT TEAM (NETWORK) [phone]   PARENT CHILD HEALTH (Maternity/NICU/Pediatrics) [phone]   [facility] [phone]   [facility] [phone]   [facility] [phone]   [facility] [phone]   [facility] [phone]   [facility] [phone]   [facility] [phone]   [facility] [phone]   [facility] [phone]   [facility] [phone]   [facility] [phone]   [facility] [phone]              [1]   Past Medical History:  Diagnosis Date    Acute deep vein thrombosis of lower leg, left (HCC) 10/16/2023    Acute heart failure, unspecified heart failure  "type ([facility]) 05/06/2024    Anxiety     Arthritis     oa cassandra knees    Asthma     good control- no medications    Yan's esophagus     Bipolar affective disorder (HCC)     Cannabis abuse with unspecified 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[facility])     Chronic pain disorder     lumbar    Contusion of elbow 12/21/2021    COPD (chronic obstructive pulmonary disease) ([facility])     CPAP (continuous positive airway pressure) dependence     DDD (degenerative disc disease), lumbar 04/09/2015    Degenerative disc disease at L5-S1 level     Deliberate self-cutting     9/15/22per pt has not done recently-- does see a therapist and psychiatrist regularly    Depression 09/16/2008    Diarrhea 01/06/2022    Disease of thyroid gland     hypo    MARTINEZ (dyspnea on exertion)     per pt \"has had this with exertion and not new\"    Drug overdose 10/28/2008    suicide attempt and again drug overdose 7/2022--[facility]-Medical floor and than transferred to [facility]    Dysphagia     Dyspnea     Ecchymosis 01/06/2022    Edema     BLE    Elevated troponin 09/02/2023    Family history of blood clots     GERD (gastroesophageal reflux disease)     Grief 11/11/2023    Headache(784.0)     Headache, tension-type     Hemorrhage of rectum and anus 10/02/2017    Formatting of this note might be different from the original.  Added automatically from request for surgery [id number]    High blood pressure     History of COVID-19 12/30/2020    Symptoms started on 12/30/2020 and then got worse.  Today she is feeling a little bit better.  She is a candidate for monoclonal antibody infusion and set for 1/6/21 @ 1pm at [facility]. 01/07/21 - telemedicine -     History of kidney stones     Hyperlipidemia     Hypertension     Ingrown toenail 12/02/2023    Intentional overdose ([facility]) 09/27/2023    Intentional self-harm by unspecified sharp object, sequela ([facility]) 02/09/2023    Irritable bowel syndrome     Knee pain, bilateral     Especially right    Knee pain, right 02/23/2022    Medial " epicondylitis of elbow, left 04/03/2018    Formatting of this note might be different from the original.  Added automatically from request for surgery [id number]    Medial epicondylitis of right elbow 07/17/2023    Medical marijuana use     Memory difficulties 08/06/2020    Memory loss     Migraines     Muscle weakness     legs    Obesity     Other psychoactive substance abuse with unspecified psychoactive substance-induced disorder (HCC) 05/06/2024    Overactive bladder     Polysubstance abuse ([facility]) 09/29/2023    Potential for cognitive impairment 06/17/2021    Primary osteoarthritis of both knees 08/08/2018    Pulmonary emboli ([facility])     Restrictive lung disease 02/01/2017    Last Assessment & Plan:   Formatting of this note might be different from the original.  I reviewed this problem throughout the rest of the note. Please refer to the other assessments for details.    Risk for falls     Sjogren's disease (HCC)     Sleep apnea     Stress incontinence     Suicidal deliberate poisoning (HCC) 07/13/2022    Suicidal ideations     Teeth missing     Toxic encephalopathy 11/08/2023    Tremor     per pt Tremors of Right Leg and both Arms    Visual impairment     Wears glasses

## 2025-06-23 NOTE — ASSESSMENT & PLAN NOTE
Presented due to lower extremity swelling and erythema.  No SIRS.  Xrays pending, no obvious osteomyelitis   Hospitalized 6/4-6/6 treated with ancef for possible cellulitis.  Discharged on oral antibiotics.  Admits she never finished taking antibiotics.   Continue IV Ancef for one more day given prior incomplete treatment and significant edema   Follow up blood cultures

## 2025-06-23 NOTE — WOUND OSTOMY CARE
Consult Note - Wound   Blanca Mason 54 y.o. female MRN: [id number]  Unit/Bed#: -01 Encounter: [id number]        History and Present Illness:  Patient is a 54- yo female that was admitted to [facility] for treatment of lower extremity cellulitis. Patient has a PMH of obesity, pre diabetes, PE/DVT on xarelto, lymphedema, hypothyroidism, gerd, htn, copd/asthma, migraine, borderline personality disorder, and harvey on cpap.Patient is an assist x1 for turning and repositioning. Patient is incontinent at times of bowel and bladder. On assessment, patient is awake, alert, and OOB to recliner. She is pleasant and agreeable to today's assessment.  She is independent with meals.     Wound Care was consulted to assess bilateral lower extremities.    Assessment Findings:   B/L heels are very dry, intact and elijah with no skin loss or wounds present. Recommend preventative Hydraguard Cream and proper offloading/ repositioning.      Patient refuses buttocks assessment at this time. Denies wounds/open areas to buttocks. Reports home recliner use.  Recommend preventative Hydraguard silicone cream to area, positioning, and use of waffle cushion.    Bilateral lower extremities are erythematous, edematous and intact with no drainage. Right tibia with two round, intact surgical scars. Old dry drainage to SteriStrips on right anterior foot. Area cleansed and covered with clean, dry, protective silicone foam.    No induration, fluctuance, odor, warmth/temperature differences, redness, or purulence noted to the above noted wounds and skin areas assessed. New dressings applied per orders listed below. Patient tolerated well- no s/s of non-verbal pain or discomfort observed during the encounter. Bedside nurse aware of plan of care. See flow sheets for more detailed assessment findings.      Orders listed below and wound care will sign off, call or Secure Chat Message with questions.     Skin Care Plan:  1-Right foot  surgical incision:  Cleanse with NSS and gently pat dry. Apply protective silicone foam dressing, [place] with a P for Prevention, and change every 3 days and prn.   2-Turn/reposition q2h or when medically stable for pressure re-distribution on skin.  3-Elevate heels to offload pressure.  4-Moisturize skin daily with skin nourishing cream  5-Ehob cushion in chair when out of bed.  6-Preventative Hydraguard to buttocks and bilateral heels BID and PRN.                 Lynn TREJON, RN, CWCN

## 2025-06-23 NOTE — PROGRESS NOTES
Progress Note - Hospitalist   Name: Blanca Mason 54 y.o. female I MRN: [id number]  Unit/Bed#: -01 I Date of Admission: 6/22/2025   Date of Service: 6/23/2025 I Hospital Day: 0    Assessment & Plan  Cellulitis of lower extremity  Presented due to lower extremity swelling and erythema.  No SIRS.  Xrays pending, no obvious osteomyelitis   Hospitalized 6/4-6/6 treated with ancef for possible cellulitis.  Discharged on oral antibiotics.  Admits she never finished taking antibiotics.   Continue IV Ancef for one more day given prior incomplete treatment and significant edema   Follow up blood cultures   Bilateral leg edema  History of lymphedema maintained on lasix daily   Status post IV Lasix x 2   Restart home PO Lasix 20 mg   Elevate when able - patient educated on this   Compression stockings   Benign essential hypertension  BP acceptable   Continue on losartan 100 mg daily, lasix 20 mg daily, clonidine 0.3 mg TID  Status post IV diuresis   Restart home Lasix dose above   Schizoaffective disorder, bipolar type ([facility])  Mood appears stable.    Last seen by psychiatry early this month during hospitalization   Topamax taper until discontinuation - patient receives pill packs at home   Strattera 60 mg daily  Wellbutrin 450 mg xl daily  Naltrexone 50 mg daily  Trileptal 450 mg q12 hours  Invega injection   Trazodone PRN  Cogentin, [place], Latuda were stopped  MAG (obstructive sleep apnea)  Continue home CPAP  BMI 45.0-49.9, adult (HCC)  BMI 47  Complicating all aspects of care  Diet and lifestyle modifications needed   Substance abuse (HCC)  Recent UDS + THC  Former meth use  History of DVT (deep vein thrombosis)  Now on xarelto  Electrolyte abnormality  Noted hypokalemia and hypomagnesemia today at 3.2 and 1.6  Give 40 mEq PO KCl and 2 g IV Mag  Check in AM     VTE Pharmacologic Prophylaxis: VTE Score: 6 High Risk (Score >/= 5) - Pharmacological DVT Prophylaxis Ordered: rivaroxaban (Xarelto). Sequential Compression  Devices Ordered.    Mobility:   Basic Mobility Inpatient Raw Score: 21  JH-HLM Goal: 6: Walk 10 steps or more  JH-HLM Achieved: 6: Walk 10 steps or more  JH-HLM Goal achieved. Continue to encourage appropriate mobility.    Patient Centered Rounds: I performed bedside rounds with nursing staff today.   Discussions with Specialists or Other Care Team Provider: Discussed with RNDAVID     Education and Discussions with Family / Patient: Patient declined call to .     Current Length of Stay: 0 day(s)  Current Patient Status: Observation   Certification Statement: The patient, admitted on an observation basis, will now require > 2 midnight hospital stay due to IV antibiotics, further monitoring of infection  Discharge Plan: Anticipate discharge tomorrow to home.    Code Status: Level 1 - Full Code    Subjective   Patient reports that she thinks that the swelling has gone down in her legs. Denies fevers or chills. Did not sleep well overnight. She reports t that she has been keeping her legs elevated but did not have them elevated right now because she was just getting vitals done.     Objective :  Temp:  [97.9 °F (36.6 °C)-98.2 °F (36.8 °C)] 98.2 °F (36.8 °C)  HR:  [] 85  BP: (124-163)/(58-87) 126/58  Resp:  [16-18] 18  SpO2:  [91 %-96 %] 91 %  O2 Device: None (Room air)    Body mass index is 48.22 kg/m².     Input and Output Summary (last 24 hours):     Intake/Output Summary (Last 24 hours) at 6/23/2025 0926  Last data filed at 6/23/2025 0521  Gross per 24 hour   Intake 0 ml   Output 2150 ml   Net -2150 ml       Physical Exam  Vitals and nursing note reviewed.   Constitutional:       General: She is not in acute distress.     Appearance: Normal appearance. She is morbidly obese. She is not ill-appearing or diaphoretic.   HENT:      Head: Normocephalic and atraumatic.      Mouth/Throat:      Mouth: Mucous membranes are moist.     Eyes:      General: No scleral icterus.     Pupils: Pupils are equal,  round, and reactive to light.       Cardiovascular:      Rate and Rhythm: Normal rate and regular rhythm.      Pulses: Normal pulses.      Heart sounds: Normal heart sounds, S1 normal and S2 normal. No murmur heard.     No systolic murmur is present.      No diastolic murmur is present.      No gallop. No S3 or S4 sounds.   Pulmonary:      Effort: Pulmonary effort is normal. No accessory muscle usage or respiratory distress.      Breath sounds: Normal breath sounds. No stridor. No decreased breath sounds, wheezing, rhonchi or rales.   Chest:      Chest wall: No tenderness.   Abdominal:      General: Bowel sounds are normal. There is no distension.      Palpations: Abdomen is soft.      Tenderness: There is no abdominal tenderness. There is no guarding.     Musculoskeletal:      Right lower le+ Pitting Edema present.      Left lower le+ Pitting Edema present.     Skin:     General: Skin is warm and dry.      Coloration: Skin is not jaundiced.      Findings: Erythema present.     Neurological:      General: No focal deficit present.      Mental Status: She is alert. Mental status is at baseline.      Motor: No tremor or seizure activity.     Psychiatric:         Behavior: Behavior is cooperative.           Lines/Drains:              Lab Results: I have reviewed the following results:   Results from last 7 days   Lab Units 25  0429   WBC Thousand/uL 7.79   HEMOGLOBIN g/dL 12.2   HEMATOCRIT % 39.1   PLATELETS Thousands/uL 229   SEGS PCT % 60   LYMPHO PCT % 24   MONO PCT % 12   EOS PCT % 2     Results from last 7 days   Lab Units 25  0429 25  0349   SODIUM mmol/L 139 141   POTASSIUM mmol/L 3.2* 3.6   CHLORIDE mmol/L 107 107   CO2 mmol/L 24 25   BUN mg/dL 6 8   CREATININE mg/dL 0.55* 0.58*   ANION GAP mmol/L 8 9   CALCIUM mg/dL 8.5 9.0   ALBUMIN g/dL  --  3.6   TOTAL BILIRUBIN mg/dL  --  0.50   ALK PHOS U/L  --  70   ALT U/L  --  10   AST U/L  --  14   GLUCOSE RANDOM mg/dL 111 97     Results from  last 7 days   Lab Units 06/22/25  0349   INR  1.53*     Results from last 7 days   Lab Units 06/23/25  0740 06/22/25  2237 06/22/25  1614   POC GLUCOSE mg/dl 122 100 105               Recent Cultures (last 7 days):   Results from last 7 days   Lab Units 06/22/25  0727 06/22/25  0509   BLOOD CULTURE  Received in Microbiology Lab. Culture in Progress. Received in Microbiology Lab. Culture in Progress.       Imaging Results Review: I personally reviewed the following image studies in PACS and associated radiology reports: xray(s). My interpretation of the radiology images/reports is: no acute fractures ..  Other Study Results Review: No additional pertinent studies reviewed.    Last 24 Hours Medication List:     Current Facility-Administered Medications:     acetaminophen (TYLENOL) tablet 650 mg, Q6H PRN    albuterol (PROVENTIL HFA,VENTOLIN HFA) inhaler 2 puff, Q6H PRN    atoMOXetine (STRATTERA) capsule 60 mg, Daily    atorvastatin (LIPITOR) tablet 80 mg, Daily    buPROPion (WELLBUTRIN XL) 24 hr tablet 450 mg, Daily    ceFAZolin (ANCEF) IVPB (premix in dextrose) 2,000 mg 50 mL, Q8H, Last Rate: 2,000 mg (06/23/25 0310)    Cholecalciferol (VITAMIN D3) tablet 1,000 Units, Daily    cloNIDine (CATAPRES) tablet 0.3 mg, BID    cyanocobalamin (VITAMIN B-12) tablet 1,000 mcg, Daily    furosemide (LASIX) tablet 20 mg, Daily    insulin lispro (HumALOG/ADMELOG) 100 units/mL subcutaneous injection 1-5 Units, HS    insulin lispro (HumALOG/ADMELOG) 100 units/mL subcutaneous injection 2-12 Units, TID AC **AND** Fingerstick Glucose (POCT), TID AC    levothyroxine tablet 125 mcg, Early Morning    loratadine (CLARITIN) tablet 10 mg, Daily    losartan (COZAAR) tablet 100 mg, Daily    magnesium sulfate 2 g/50 mL IVPB (premix) 2 g, Once, Last Rate: 2 g (06/23/25 0836)    naltrexone (REVIA) tablet 50 mg, Daily    OXcarbazepine (TRILEPTAL) tablet 450 mg, Q12H TASHA    oxybutynin (DITROPAN) tablet 5 mg, BID    pantoprazole (PROTONIX) EC tablet  40 mg, Early Morning    pilocarpine (SALAGEN) tablet 5 mg, TID    rivaroxaban (XARELTO) tablet 20 mg, Daily With Breakfast    rOPINIRole (REQUIP) tablet 1 mg, Daily    traZODone (DESYREL) tablet 50 mg, HS PRN    Administrative Statements   Today, Patient Was Seen By: Manoj Casper PA-C  I have spent a total time of 35 minutes in caring for this patient on the day of the visit/encounter including Diagnostic results, Instructions for management, Impressions, Counseling / Coordination of care, Documenting in the medical record, Reviewing/placing orders in the medical record (including tests, medications, and/or procedures), Obtaining or reviewing history  , and Communicating with other healthcare professionals .    **Please Note: This note may have been constructed using a voice recognition system.**

## 2025-06-23 NOTE — ASSESSMENT & PLAN NOTE
History of lymphedema maintained on lasix daily   Status post IV Lasix x 2   Restart home PO Lasix 20 mg   Elevate when able - patient educated on this   Compression stockings

## 2025-06-23 NOTE — PLAN OF CARE
Problem: Potential for Falls  Goal: Patient will remain free of falls  Description: INTERVENTIONS:  - Educate patient/family on patient safety including physical limitations  - Instruct patient to call for assistance with activity   - Consider consulting OT/PT to assist with strengthening/mobility based on AM PAC & JH-HLM score  - Consult OT/PT to assist with strengthening/mobility   - Keep Call bell within reach  - Keep bed low and locked with side rails adjusted as appropriate  - Keep care items and personal belongings within reach  - Initiate and maintain comfort rounds  - Make Fall Risk Sign visible to staff  - Offer Toileting every 2 Hours, in advance of need    - Apply yellow socks and bracelet for high fall risk patients  - Consider moving patient to room near nurses station  Outcome: Progressing     Problem: PAIN - ADULT  Goal: Verbalizes/displays adequate comfort level or baseline comfort level  Description: Interventions:  - Encourage patient to monitor pain and request assistance  - Assess pain using appropriate pain scale  - Administer analgesics as ordered based on type and severity of pain and evaluate response  - Implement non-pharmacological measures as appropriate and evaluate response  - Consider cultural and social influences on pain and pain management  - Notify physician/advanced practitioner if interventions unsuccessful or patient reports new pain  - Educate patient/family on pain management process including their role and importance of  reporting pain   - Provide non-pharmacologic/complimentary pain relief interventions  Outcome: Progressing     Problem: INFECTION - ADULT  Goal: Absence or prevention of progression during hospitalization  Description: INTERVENTIONS:  - Assess and monitor for signs and symptoms of infection  - Monitor lab/diagnostic results  - Monitor all insertion sites, i.e. indwelling lines, tubes, and drains  - Monitor endotracheal if appropriate and nasal secretions  for changes in amount and color  - [place] appropriate cooling/warming therapies per order  - Administer medications as ordered  - Instruct and encourage patient and family to use good hand hygiene technique  - Identify and instruct in appropriate isolation precautions for identified infection/condition  Outcome: Progressing  Goal: Absence of fever/infection during neutropenic period  Description: INTERVENTIONS:  - Monitor WBC  - Perform strict hand hygiene  - Limit to healthy visitors only  - No plants, dried, fresh or silk flowers with prasad in patient room  Outcome: Progressing     Problem: SAFETY ADULT  Goal: Patient will remain free of falls  Description: INTERVENTIONS:  - Educate patient/family on patient safety including physical limitations  - Instruct patient to call for assistance with activity   - Consider consulting OT/PT to assist with strengthening/mobility based on AM PAC & JH-HLM score  - Consult OT/PT to assist with strengthening/mobility   - Keep Call bell within reach  - Keep bed low and locked with side rails adjusted as appropriate  - Keep care items and personal belongings within reach  - Initiate and maintain comfort rounds  - Make Fall Risk Sign visible to staff  - Offer Toileting every 2 Hours, in advance of need    - Apply yellow socks and bracelet for high fall risk patients  - Consider moving patient to room near nurses station  Outcome: Progressing  Goal: Maintain or return to baseline ADL function  Description: INTERVENTIONS:  -  Assess patient's ability to carry out ADLs; assess patient's baseline for ADL function and identify physical deficits which impact ability to perform ADLs (bathing, care of mouth/teeth, toileting, grooming, dressing, etc.)  - Assess/evaluate cause of self-care deficits   - Assess range of motion  - Assess patient's mobility; develop plan if impaired  - Assess patient's need for assistive devices and provide as appropriate  - Encourage maximum independence but  intervene and supervise when necessary  - Involve family in performance of ADLs  - Assess for home care needs following discharge   - Consider OT consult to assist with ADL evaluation and planning for discharge  - Provide patient education as appropriate  - Monitor functional capacity and physical performance, use of AM PAC & JH-HLM   - Monitor gait, balance and fatigue with ambulation    Outcome: Progressing  Goal: Maintains/Returns to pre admission functional level  Description: INTERVENTIONS:  - Perform AM-PAC 6 Click Basic Mobility/ Daily Activity assessment daily.  - Set and communicate daily mobility goal to care team and patient/family/caregiver.   - Collaborate with rehabilitation services on mobility goals if consulted  - Perform Range of Motion 3 times a day.  - Reposition patient every 2 hours.  - Dangle patient 3 times a day  - Stand patient 3 times a day  - Ambulate patient 3 times a day  - Out of bed to chair 3 times a day   - Out of bed for meals 3 times a day  - Out of bed for toileting  - Record patient progress and toleration of activity level   Outcome: Progressing     Problem: DISCHARGE PLANNING  Goal: Discharge to home or other facility with appropriate resources  Description: INTERVENTIONS:  - Identify barriers to discharge w/patient and caregiver  - Arrange for needed discharge resources and transportation as appropriate  - Identify discharge learning needs (meds, wound care, etc.)  - Arrange for interpretive services to assist at discharge as needed  - Refer to Case Management Department for coordinating discharge planning if the patient needs post-hospital services based on physician/advanced practitioner order or complex needs related to functional status, cognitive ability, or social support system  Outcome: Progressing     Problem: Knowledge Deficit  Goal: Patient/family/caregiver demonstrates understanding of disease process, treatment plan, medications, and discharge  instructions  Description: Complete learning assessment and assess knowledge base.  Interventions:  - Provide teaching at level of understanding  - Provide teaching via preferred learning methods  Outcome: Progressing

## 2025-06-23 NOTE — DISCHARGE INSTR - OTHER ORDERS
Skin Care Plan:  1-Right foot surgical incision:  Cleanse with NSS and gently pat dry. Apply protective silicone foam dressing, [place] with a P for Prevention, and change every 3 days and prn.   2-Turn/reposition q2h or when medically stable for pressure re-distribution on skin.  3-Elevate heels to offload pressure.  4-Moisturize skin daily with skin nourishing cream  5-Ehob cushion in chair when out of bed.  6-Preventative Hydraguard to buttocks and bilateral heels BID and PRN.

## 2025-06-23 NOTE — ASSESSMENT & PLAN NOTE
Noted hypokalemia and hypomagnesemia today at 3.2 and 1.6  Give 40 mEq PO KCl and 2 g IV Mag  Check in AM

## 2025-06-23 NOTE — ASSESSMENT & PLAN NOTE
BP acceptable   Continue on losartan 100 mg daily, lasix 20 mg daily, clonidine 0.3 mg TID  Status post IV diuresis   Restart home Lasix dose above

## 2025-06-24 ENCOUNTER — TRANSITIONAL CARE MANAGEMENT (OUTPATIENT)
Dept: FAMILY MEDICINE CLINIC | Facility: CLINIC | Age: 55
End: 2025-06-24

## 2025-06-24 ENCOUNTER — TELEPHONE (OUTPATIENT)
Age: 55
End: 2025-06-24

## 2025-06-24 VITALS
OXYGEN SATURATION: 93 % | BODY MASS INDEX: 47.09 KG/M2 | SYSTOLIC BLOOD PRESSURE: 103 MMHG | WEIGHT: 293 LBS | DIASTOLIC BLOOD PRESSURE: 54 MMHG | TEMPERATURE: 98.1 F | HEIGHT: 66 IN | RESPIRATION RATE: 18 BRPM | HEART RATE: 81 BPM

## 2025-06-24 LAB
ANION GAP SERPL CALCULATED.3IONS-SCNC: 6 MMOL/L (ref 4–13)
BUN SERPL-MCNC: 6 MG/DL (ref 5–25)
CALCIUM SERPL-MCNC: 8.3 MG/DL (ref 8.4–10.2)
CHLORIDE SERPL-SCNC: 107 MMOL/L (ref 96–108)
CO2 SERPL-SCNC: 25 MMOL/L (ref 21–32)
CREAT SERPL-MCNC: 0.56 MG/DL (ref 0.6–1.3)
GFR SERPL CREATININE-BSD FRML MDRD: 106 ML/MIN/1.73SQ M
GLUCOSE SERPL-MCNC: 101 MG/DL (ref 65–140)
GLUCOSE SERPL-MCNC: 121 MG/DL (ref 65–140)
GLUCOSE SERPL-MCNC: 130 MG/DL (ref 65–140)
MAGNESIUM SERPL-MCNC: 1.9 MG/DL (ref 1.9–2.7)
POTASSIUM SERPL-SCNC: 3.6 MMOL/L (ref 3.5–5.3)
SODIUM SERPL-SCNC: 138 MMOL/L (ref 135–147)

## 2025-06-24 PROCEDURE — 82948 REAGENT STRIP/BLOOD GLUCOSE: CPT

## 2025-06-24 PROCEDURE — 99239 HOSP IP/OBS DSCHRG MGMT >30: CPT

## 2025-06-24 PROCEDURE — 83735 ASSAY OF MAGNESIUM: CPT | Performed by: PHYSICIAN ASSISTANT

## 2025-06-24 PROCEDURE — 80048 BASIC METABOLIC PNL TOTAL CA: CPT | Performed by: PHYSICIAN ASSISTANT

## 2025-06-24 RX ORDER — CEPHALEXIN 500 MG/1
500 CAPSULE ORAL EVERY 8 HOURS SCHEDULED
Qty: 21 CAPSULE | Refills: 0 | Status: SHIPPED | OUTPATIENT
Start: 2025-06-24 | End: 2025-07-01

## 2025-06-24 RX ADMIN — LEVOTHYROXINE SODIUM 125 MCG: 0.12 TABLET ORAL at 04:53

## 2025-06-24 RX ADMIN — Medication 1000 UNITS: at 08:27

## 2025-06-24 RX ADMIN — RIVAROXABAN 20 MG: 20 TABLET, FILM COATED ORAL at 08:28

## 2025-06-24 RX ADMIN — PANTOPRAZOLE SODIUM 40 MG: 40 TABLET, DELAYED RELEASE ORAL at 04:53

## 2025-06-24 RX ADMIN — OXCARBAZEPINE 450 MG: 150 TABLET, FILM COATED ORAL at 08:26

## 2025-06-24 RX ADMIN — CYANOCOBALAMIN TAB 500 MCG 1000 MCG: 500 TAB at 08:27

## 2025-06-24 RX ADMIN — CEFAZOLIN SODIUM 2000 MG: 2 SOLUTION INTRAVENOUS at 10:14

## 2025-06-24 RX ADMIN — ROPINIROLE HYDROCHLORIDE 1 MG: 1 TABLET, FILM COATED ORAL at 08:26

## 2025-06-24 RX ADMIN — ATOMOXETINE 60 MG: 60 CAPSULE ORAL at 08:26

## 2025-06-24 RX ADMIN — BUPROPION HYDROCHLORIDE 450 MG: 150 TABLET, EXTENDED RELEASE ORAL at 08:27

## 2025-06-24 RX ADMIN — LORATADINE 10 MG: 10 TABLET ORAL at 08:27

## 2025-06-24 RX ADMIN — NALTREXONE HYDROCHLORIDE 50 MG: 50 TABLET, FILM COATED ORAL at 08:28

## 2025-06-24 RX ADMIN — CEFAZOLIN SODIUM 2000 MG: 2 SOLUTION INTRAVENOUS at 01:13

## 2025-06-24 RX ADMIN — ATORVASTATIN CALCIUM 80 MG: 40 TABLET, FILM COATED ORAL at 08:28

## 2025-06-24 RX ADMIN — OXYBUTYNIN CHLORIDE 5 MG: 5 TABLET ORAL at 08:27

## 2025-06-24 RX ADMIN — PILOCARPINE HYDROCHLORIDE 5 MG: 5 TABLET, FILM COATED ORAL at 08:26

## 2025-06-24 NOTE — PLAN OF CARE
Problem: PAIN - ADULT  Goal: Verbalizes/displays adequate comfort level or baseline comfort level  Description: Interventions:  - Encourage patient to monitor pain and request assistance  - Assess pain using appropriate pain scale  - Administer analgesics as ordered based on type and severity of pain and evaluate response  - Implement non-pharmacological measures as appropriate and evaluate response  - Consider cultural and social influences on pain and pain management  - Notify physician/advanced practitioner if interventions unsuccessful or patient reports new pain  - Educate patient/family on pain management process including their role and importance of  reporting pain   - Provide non-pharmacologic/complimentary pain relief interventions  Outcome: Progressing     Problem: INFECTION - ADULT  Goal: Absence or prevention of progression during hospitalization  Description: INTERVENTIONS:  - Assess and monitor for signs and symptoms of infection  - Monitor lab/diagnostic results  - Monitor all insertion sites, i.e. indwelling lines, tubes, and drains  - Monitor endotracheal if appropriate and nasal secretions for changes in amount and color  - [place] appropriate cooling/warming therapies per order  - Administer medications as ordered  - Instruct and encourage patient and family to use good hand hygiene technique  - Identify and instruct in appropriate isolation precautions for identified infection/condition  Outcome: Progressing     Problem: INFECTION - ADULT  Goal: Absence of fever/infection during neutropenic period  Description: INTERVENTIONS:  - Monitor WBC  - Perform strict hand hygiene  - Limit to healthy visitors only  - No plants, dried, fresh or silk flowers with prasad in patient room  Outcome: Progressing     Problem: SAFETY ADULT  Goal: Maintain or return to baseline ADL function  Description: INTERVENTIONS:  -  Assess patient's ability to carry out ADLs; assess patient's baseline for ADL function and  identify physical deficits which impact ability to perform ADLs (bathing, care of mouth/teeth, toileting, grooming, dressing, etc.)  - Assess/evaluate cause of self-care deficits   - Assess range of motion  - Assess patient's mobility; develop plan if impaired  - Assess patient's need for assistive devices and provide as appropriate  - Encourage maximum independence but intervene and supervise when necessary  - Involve family in performance of ADLs  - Assess for home care needs following discharge   - Consider OT consult to assist with ADL evaluation and planning for discharge  - Provide patient education as appropriate  - Monitor functional capacity and physical performance, use of AM PAC & JH-HLM   - Monitor gait, balance and fatigue with ambulation    Outcome: Progressing     Problem: DISCHARGE PLANNING  Goal: Discharge to home or other facility with appropriate resources  Description: INTERVENTIONS:  - Identify barriers to discharge w/patient and caregiver  - Arrange for needed discharge resources and transportation as appropriate  - Identify discharge learning needs (meds, wound care, etc.)  - Arrange for interpretive services to assist at discharge as needed  - Refer to Case Management Department for coordinating discharge planning if the patient needs post-hospital services based on physician/advanced practitioner order or complex needs related to functional status, cognitive ability, or social support system  Outcome: Progressing     Problem: Knowledge Deficit  Goal: Patient/family/caregiver demonstrates understanding of disease process, treatment plan, medications, and discharge instructions  Description: Complete learning assessment and assess knowledge base.  Interventions:  - Provide teaching at level of understanding  - Provide teaching via preferred learning methods  Outcome: Progressing     Problem: DISCHARGE PLANNING  Goal: Discharge to home or other facility with appropriate resources  Description:  INTERVENTIONS:  - Identify barriers to discharge w/patient and caregiver  - Arrange for needed discharge resources and transportation as appropriate  - Identify discharge learning needs (meds, wound care, etc.)  - Arrange for interpretive services to assist at discharge as needed  - Refer to Case Management Department for coordinating discharge planning if the patient needs post-hospital services based on physician/advanced practitioner order or complex needs related to functional status, cognitive ability, or social support system  Outcome: Progressing

## 2025-06-24 NOTE — ASSESSMENT & PLAN NOTE
Noted hypokalemia and hypomagnesemia at 3.2 and 1.6  Given 40 mEq PO KCl and 2 g IV Mag   Potassium within normal limits.  On repeat following morning

## 2025-06-24 NOTE — DISCHARGE SUMMARY
Discharge Summary - Hospitalist   Name: Blanca Mason 54 y.o. female I MRN: [id number]  Unit/Bed#: -01 I Date of Admission: 6/22/2025   Date of Service: 6/24/2025 I Hospital Day: 1     Assessment & Plan  Cellulitis of lower extremity  Presented due to lower extremity swelling and erythema.  No SIRS.  Xrays pending, no obvious osteomyelitis   Hospitalized 6/4-6/6 treated with ancef for possible cellulitis.  Discharged on oral antibiotics.  Admits she never finished taking antibiotics.   Continue IV Ancef for one more day given prior incomplete treatment and significant edema   Follow up blood cultures   Bilateral leg edema  History of lymphedema maintained on lasix daily   Status post IV Lasix x 2   Restart home PO Lasix 20 mg   Elevate when able - patient educated on this   Compression stockings   Benign essential hypertension  BP acceptable   Continue on losartan 100 mg daily, lasix 20 mg daily, clonidine 0.3 mg TID  Status post IV diuresis   Restart home Lasix dose above   Schizoaffective disorder, bipolar type ([facility])  Mood appears stable.    Last seen by psychiatry early this month during hospitalization   Topamax taper until discontinuation - patient receives pill packs at home   Strattera 60 mg daily  Wellbutrin 450 mg xl daily  Naltrexone 50 mg daily  Trileptal 450 mg q12 hours  Invega injection   Trazodone PRN  Cogentin, Austedo, Latuda were stopped  MAG (obstructive sleep apnea)  Continue home CPAP  BMI 45.0-49.9, adult ([facility])  Body mass index is 48.22 kg/m².  Recommend incorporating a more whole foods plant-predominant diet along with decreasing consumption of red meats and processed foods  Per AHA guidelines, recommend moderate-vigorous intensity exercise for 30 minutes a day for 5 days a week or a total of 150 min/week  Substance abuse ([facility])  Recent UDS + THC  Former meth use  History of DVT (deep vein thrombosis)  Now on xarelto  Electrolyte abnormality  Noted hypokalemia and hypomagnesemia at 3.2  and 1.6  Given 40 mEq PO KCl and 2 g IV Mag   Potassium within normal limits.  On repeat following morning     Discharging Physician / Practitioner: Amandeep Mock PA-C  PCP: JHONATAN Armando  Admission Date:   Admission Orders (From admission, onward)       Ordered        06/23/25 0929  INPATIENT ADMISSION  Once            06/22/25 0632  Place in Observation  Once                          Discharge Date: 06/24/25    Consultations During Hospital Stay:  None    Procedures Performed:   None    Significant Findings / Test Results:   XR tibia fibula 2 views LEFT  Result Date: 6/23/2025  Impression: Diffuse subcutaneous edema without radiographic evidence of osteomyelitis at this time. . Computerized Assisted Algorithm (CAA) may have been used to analyze all applicable images. Resident: Luigi Castaneda I, the attending radiologist, have reviewed the images and agree with the final report above. Workstation performed: [id number]     XR hip/pelv 2-3 vws right if performed  Result Date: 6/23/2025  Impression: No evidence of an acute osseous abnormality. Nonspecific diffuse subcutaneous edema. Computerized Assisted Algorithm (CAA) may have been used to analyze all applicable images. Workstation performed: [id number]     XR tibia fibula 2 views RIGHT  Result Date: 6/23/2025  Impression: No evidence of an acute osseous abnormality. Nonspecific diffuse subcutaneous edema. Computerized Assisted Algorithm (CAA) may have been used to analyze all applicable images. Workstation performed: [id number]       Results for orders placed during the hospital encounter of 09/01/23    Echo complete w/ contrast if indicated    Interpretation Summary    Left Ventricle: Left ventricular cavity size is normal. Wall thickness is normal. The left ventricular ejection fraction is 60%. Systolic function is normal. Wall motion is normal. Diastolic function is normal.    Right Ventricle: Right ventricular cavity size is normal. Systolic  function is normal.    Left Atrium: The atrium is mildly dilated.    Mitral Valve: There is mild regurgitation.    Tricuspid Valve: There is mild to moderate regurgitation. The estimated right ventricular systolic pressure is 60.00 mmHg.    Pulmonic Valve: There is mild regurgitation.      Recent Labs     06/22/25  0509 06/22/25  0727   BLOODCX No Growth at 24 hrs. No Growth at 24 hrs.       Incidental Findings:   None other than noted above; I reviewed the above mentioned incidental findings with the patient and/or family and they expressed understanding.    Test Results Pending at Discharge (will require follow up):   None     Outpatient Tests Requested:  None    Complications:  None    Reason for Admission: Leg Swelling (Pt arrives via EMS from home for increased pain and swelling in b/l lower extremities. )       Hospital Course:   Patient initially presented after having lower extremity swelling and erythema.  She was previously diagnosed with cellulitis and was not compliant with her Keflex antibiotics that was prescribed.  She presented for worsening cellulitic infection.  She underwent x-rays which showed no obvious signs of osteomyelitis.  Patient was started on IV Ancef and had improvement.  She was ultimately medically stable to be discharged back on p.o. Keflex regimen on 6/24.  She is instructed to follow-up with her PCP within 1 week for further continuity care.  For further information regards course hospitalization, please see notes attached.      Please see above list of diagnoses and related plan for additional information.     Condition at Discharge: good    Discharge Day Visit / Exam:   Subjective: Patient reports be feeling well.  Currently denies any chest pain/pressure, palpitations, lightness, nausea, shortness of breath, or chills.  Offers no new complaints at this time. No acute events reported overnight. Understanding of plan.  All questions answered to the best of my ability at this time  "to patient satisfaction. Plan of care agreed upon.  Vitals: Blood Pressure: 103/54 (06/24/25 0724)  Pulse: 81 (06/24/25 0724)  Temperature: 98.1 °F (36.7 °C) (06/24/25 0724)  Temp Source: Oral (06/24/25 0724)  Respirations: 18 (06/24/25 0724)  Height: 5' 6\" (167.6 cm) (06/22/25 2222)  Weight - Scale: 135 kg (298 lb 11.6 oz) (06/22/25 2222)  SpO2: 93 % (06/24/25 0724)  Exam:   Physical Exam  Vitals and nursing note reviewed.   Constitutional:       General: She is not in acute distress.     Appearance: She is obese. She is not ill-appearing, toxic-appearing or diaphoretic.   HENT:      Head: Normocephalic.      Nose: Nose normal.      Mouth/Throat:      Mouth: Mucous membranes are moist.      Pharynx: Oropharynx is clear.     Eyes:      General: No scleral icterus.     Conjunctiva/sclera: Conjunctivae normal.      Pupils: Pupils are equal, round, and reactive to light.       Cardiovascular:      Rate and Rhythm: Normal rate and regular rhythm.      Heart sounds: No murmur heard.     No friction rub. No gallop.   Pulmonary:      Effort: Pulmonary effort is normal. No respiratory distress.      Breath sounds: Normal breath sounds. No stridor. No wheezing, rhonchi or rales.   Abdominal:      General: Abdomen is flat.      Palpations: Abdomen is soft.     Musculoskeletal:         General: Normal range of motion.      Cervical back: Normal range of motion and neck supple.      Right lower leg: Edema (Mild erythema, patient reports significantly improved compared to yesterday) present.      Left lower leg: Edema (Mild erythema, patient reports significant improved compared yesterday) present.   Lymphadenopathy:      Cervical: No cervical adenopathy.     Skin:     General: Skin is warm.      Coloration: Skin is not jaundiced or pale.      Findings: No bruising, erythema or lesion.     Neurological:      General: No focal deficit present.      Mental Status: She is alert and oriented to person, place, and time. Mental status " is at baseline.      Cranial Nerves: No cranial nerve deficit.      Motor: No weakness.     Psychiatric:         Mood and Affect: Mood normal.         Behavior: Behavior normal.         Thought Content: Thought content normal.          Discussion with Family: Patient declined call to .     Discharge instructions/Information to patient and family:   See after visit summary for information provided to patient and family.      Provisions for Follow-Up Care:  See after visit summary for information related to follow-up care and any pertinent home health orders.       Mobility at time of Discharge:   Basic Mobility Inpatient Raw Score: 24  JH-HLM Goal: 8: Walk 250 feet or more  JH-HLM Achieved: 5: Stand (1 or more minutes)  HLM Goal achieved. Continue to encourage appropriate mobility.    Disposition:   Home    Planned Readmission: none     Discharge Statement:  I spent 65 minutes discharging the patient. This time was spent on the day of discharge. I had direct contact with the patient on the day of discharge. Greater than 50% of the total time was spent examining patient, answering all patient questions, arranging and discussing plan of care with patient as well as directly providing post-discharge instructions.  Additional time then spent on discharge activities.    Discharge Medications:  See after visit summary for reconciled discharge medications provided to patient and/or family.      **Please Note: This note may have been constructed using a voice recognition system**

## 2025-06-24 NOTE — ASSESSMENT & PLAN NOTE
Body mass index is 48.22 kg/m².  Recommend incorporating a more whole foods plant-predominant diet along with decreasing consumption of red meats and processed foods  Per AHA guidelines, recommend moderate-vigorous intensity exercise for 30 minutes a day for 5 days a week or a total of 150 min/week

## 2025-06-24 NOTE — UTILIZATION REVIEW
NOTIFICATION OF INPATIENT ADMISSION   AUTHORIZATION REQUEST   SERVICING FACILITY:   [place] [address]  Tax ID: [id number]  NPI: [id number]   ATTENDING PROVIDER:  Attending Name and NPI#: Melissa Chakraborty Md [[id number]]  Address: [address] [place] [address]  Phone: [phone]     ADMISSION INFORMATION:  Place of Service: Inpatient [facility]  Place of Service Code: 21  Inpatient Admission Date/Time: 6/23/25  9:29 AM  Discharge Date/Time: 6/24/2025 12:35 PM  Admitting Diagnosis Code/Description:  Subtherapeutic international normalized ratio (INR) [R79.1]  Bilateral lower leg cellulitis [L03.116, L03.115]  Ambulatory dysfunction [R26.2]     UTILIZATION REVIEW CONTACT:  Sri Maravilla Utilization   Network Utilization Review Department  Phone: [phone]  Fax: [phone]  Email: [email or web address]  Contact for approvals/pending authorizations, clinical reviews, and discharge.     PHYSICIAN ADVISORY SERVICES:  Medical Necessity Denial & Tyfs-xy-Cwnn Review  Phone: [phone]  Fax: [phone]  Email: [email or web address]     DISCHARGE SUPPORT TEAM:  For Patients Discharge Needs & Updates  Phone: [phone] opt. 2 Fax: [phone]  Email: [email or web address]

## 2025-06-24 NOTE — DISCHARGE INSTR - AVS FIRST PAGE
Please be aware I have ordered an antibiotic called Keflex.  Please take this medication 3 times a day for the next 7 days.  Please complete antibiotic course.  If you are feeling significantly improved, please continue to take your Keflex until the antibiotic is completed.  Please see your PCP within 1 week for further continuity care.

## 2025-06-25 NOTE — UTILIZATION REVIEW
NOTIFICATION OF ADMISSION DISCHARGE   This is a Notification of Discharge from [facility]. Please be advised that this patient has been discharge from our facility. Below you will find the admission and discharge date and time including the patient’s disposition.   UTILIZATION REVIEW CONTACT:  Utilization Review Assistants  Network Utilization Review Department  Phone: [phone] x carefully listen to the prompts. All voicemails are confidential.  Email: [email or web address]     ADMISSION INFORMATION  PRESENTATION DATE: 6/22/2025  3:51 AM  OBERVATION ADMISSION DATE: 06/22/2025 0632  INPATIENT ADMISSION DATE: 6/23/25  9:29 AM   DISCHARGE DATE: 6/24/2025 12:35 PM   DISPOSITION:Home/Self Care    Network Utilization Review Department  ATTENTION: Please call with any questions or concerns to [phone] and carefully listen to the prompts so that you are directed to the right person. All voicemails are confidential.   For Discharge needs, contact Care Management DC Support Team at [phone] opt. 2  Send all requests for admission clinical reviews, approved or denied determinations and any other requests to dedicated fax number below belonging to the campus where the patient is receiving treatment. List of dedicated fax numbers for the Facilities:  FACILITY NAME UR FAX NUMBER   ADMISSION DENIALS (Administrative/Medical Necessity) [phone]   DISCHARGE SUPPORT TEAM ([facility]) [phone]   PARENT CHILD HEALTH (Maternity/NICU/Pediatrics) [phone]   [facility] [phone]   [facility] [phone]   [facility] [phone]   [facility] [phone]   [facility] [phone]   [facility] [phone]   [facility] [phone]   [facility] [phone]    [facility] [phone]   [facility] [phone]   [facility] [phone]   [facility] [phone]

## 2025-06-26 ENCOUNTER — HOSPITAL ENCOUNTER (EMERGENCY)
Facility: HOSPITAL | Age: 55
Discharge: HOME | End: 2025-06-26
Attending: EMERGENCY MEDICINE | Admitting: EMERGENCY MEDICINE
Payer: COMMERCIAL

## 2025-06-26 VITALS
OXYGEN SATURATION: 95 % | SYSTOLIC BLOOD PRESSURE: 135 MMHG | RESPIRATION RATE: 18 BRPM | HEART RATE: 105 BPM | TEMPERATURE: 98.7 F | DIASTOLIC BLOOD PRESSURE: 63 MMHG

## 2025-06-26 DIAGNOSIS — M79.671 RIGHT FOOT PAIN: ICD-10-CM

## 2025-06-26 DIAGNOSIS — I89.0 LYMPHEDEMA: Primary | ICD-10-CM

## 2025-06-26 LAB
ANION GAP SERPL CALCULATED.3IONS-SCNC: 9 MMOL/L (ref 4–13)
BASOPHILS # BLD AUTO: 0.05 THOUSANDS/ÂΜL (ref 0–0.1)
BASOPHILS NFR BLD AUTO: 0 % (ref 0–1)
BUN SERPL-MCNC: 7 MG/DL (ref 5–25)
CALCIUM SERPL-MCNC: 9.1 MG/DL (ref 8.4–10.2)
CHLORIDE SERPL-SCNC: 106 MMOL/L (ref 96–108)
CO2 SERPL-SCNC: 26 MMOL/L (ref 21–32)
CREAT SERPL-MCNC: 0.54 MG/DL (ref 0.6–1.3)
EOSINOPHIL # BLD AUTO: 0.09 THOUSAND/ÂΜL (ref 0–0.61)
EOSINOPHIL NFR BLD AUTO: 1 % (ref 0–6)
ERYTHROCYTE [DISTWIDTH] IN BLOOD BY AUTOMATED COUNT: 15.9 % (ref 11.6–15.1)
GFR SERPL CREATININE-BSD FRML MDRD: 108 ML/MIN/1.73SQ M
GLUCOSE SERPL-MCNC: 103 MG/DL (ref 65–140)
HCT VFR BLD AUTO: 41.4 % (ref 34.8–46.1)
HGB BLD-MCNC: 12.6 G/DL (ref 11.5–15.4)
IMM GRANULOCYTES # BLD AUTO: 0.08 THOUSAND/UL (ref 0–0.2)
IMM GRANULOCYTES NFR BLD AUTO: 1 % (ref 0–2)
LYMPHOCYTES # BLD AUTO: 2.08 THOUSANDS/ÂΜL (ref 0.6–4.47)
LYMPHOCYTES NFR BLD AUTO: 19 % (ref 14–44)
MAGNESIUM SERPL-MCNC: 1.8 MG/DL (ref 1.9–2.7)
MCH RBC QN AUTO: 27.9 PG (ref 26.8–34.3)
MCHC RBC AUTO-ENTMCNC: 30.4 G/DL (ref 31.4–37.4)
MCV RBC AUTO: 92 FL (ref 82–98)
MONOCYTES # BLD AUTO: 1.06 THOUSAND/ÂΜL (ref 0.17–1.22)
MONOCYTES NFR BLD AUTO: 9 % (ref 4–12)
NEUTROPHILS # BLD AUTO: 7.91 THOUSANDS/ÂΜL (ref 1.85–7.62)
NEUTS SEG NFR BLD AUTO: 70 % (ref 43–75)
NRBC BLD AUTO-RTO: 0 /100 WBCS
PLATELET # BLD AUTO: 247 THOUSANDS/UL (ref 149–390)
PMV BLD AUTO: 10.1 FL (ref 8.9–12.7)
POTASSIUM SERPL-SCNC: 3.4 MMOL/L (ref 3.5–5.3)
PROCALCITONIN SERPL-MCNC: <0.05 NG/ML
RBC # BLD AUTO: 4.52 MILLION/UL (ref 3.81–5.12)
SODIUM SERPL-SCNC: 141 MMOL/L (ref 135–147)
WBC # BLD AUTO: 11.27 THOUSAND/UL (ref 4.31–10.16)

## 2025-06-26 PROCEDURE — 99284 EMERGENCY DEPT VISIT MOD MDM: CPT | Performed by: EMERGENCY MEDICINE

## 2025-06-26 PROCEDURE — 85025 COMPLETE CBC W/AUTO DIFF WBC: CPT | Performed by: EMERGENCY MEDICINE

## 2025-06-26 PROCEDURE — 99283 EMERGENCY DEPT VISIT LOW MDM: CPT

## 2025-06-26 PROCEDURE — 93005 ELECTROCARDIOGRAM TRACING: CPT

## 2025-06-26 PROCEDURE — 84145 PROCALCITONIN (PCT): CPT | Performed by: EMERGENCY MEDICINE

## 2025-06-26 PROCEDURE — 96365 THER/PROPH/DIAG IV INF INIT: CPT

## 2025-06-26 PROCEDURE — 36415 COLL VENOUS BLD VENIPUNCTURE: CPT | Performed by: EMERGENCY MEDICINE

## 2025-06-26 PROCEDURE — 83735 ASSAY OF MAGNESIUM: CPT | Performed by: EMERGENCY MEDICINE

## 2025-06-26 PROCEDURE — 80048 BASIC METABOLIC PNL TOTAL CA: CPT | Performed by: EMERGENCY MEDICINE

## 2025-06-26 PROCEDURE — 96375 TX/PRO/DX INJ NEW DRUG ADDON: CPT

## 2025-06-26 RX ORDER — MAGNESIUM SULFATE 1 G/100ML
1 INJECTION INTRAVENOUS ONCE
Status: COMPLETED | OUTPATIENT
Start: 2025-06-26 | End: 2025-06-26

## 2025-06-26 RX ORDER — FUROSEMIDE 10 MG/ML
40 INJECTION INTRAMUSCULAR; INTRAVENOUS ONCE
Status: COMPLETED | OUTPATIENT
Start: 2025-06-26 | End: 2025-06-26

## 2025-06-26 RX ORDER — POTASSIUM CHLORIDE 1500 MG/1
40 TABLET, EXTENDED RELEASE ORAL ONCE
Status: COMPLETED | OUTPATIENT
Start: 2025-06-26 | End: 2025-06-26

## 2025-06-26 RX ADMIN — POTASSIUM CHLORIDE 40 MEQ: 1500 TABLET, EXTENDED RELEASE ORAL at 22:26

## 2025-06-26 RX ADMIN — MAGNESIUM SULFATE HEPTAHYDRATE 1 G: 1 INJECTION, SOLUTION INTRAVENOUS at 22:26

## 2025-06-26 RX ADMIN — FUROSEMIDE 40 MG: 10 INJECTION, SOLUTION INTRAMUSCULAR; INTRAVENOUS at 21:57

## 2025-06-27 LAB
BACTERIA BLD CULT: NORMAL
BACTERIA BLD CULT: NORMAL

## 2025-06-27 NOTE — ED PROVIDER NOTES
Time reflects when diagnosis was documented in both MDM as applicable and the Disposition within this note       Time User Action Codes Description Comment    6/26/2025 10:58 PM David Phelps [I89.0] Lymphedema     6/26/2025 10:58 PM David Phelps [M79.671] Right foot pain           ED Disposition       ED Disposition   Discharge    Condition   Stable    Date/Time   Thu Jun 26, 2025 10:58 PM    Comment   Blanca Mason discharge to home/self care.                   Assessment & Plan       Medical Decision Making  Pedal edema: Patient has history of chronic pedal edema with recent negative testing to rule out DVT.  Foot pain: Patient has no pain in right calf, previously area of treatment for cellulitis.  She does complain of pain in her right foot that feels like pain from swelling.  She notes she has only been using compression stockings on her left leg not her right leg, and has considerably more swelling on her right leg since beginning the compression stockings than she has on her left.  I suspect pain is secondary to pedal edema.  Patient treated with IV diuretic and reports she will continue using her p.o. diuretic at home.  No SIRS criteria or exam findings to suggest cellulitis at this time.    Amount and/or Complexity of Data Reviewed  Labs: ordered. Decision-making details documented in ED Course.    Risk  Prescription drug management.  Decision regarding hospitalization.        ED Course as of 06/27/25 0542   Thu Jun 26, 2025 2130 Differential includes lymphedema, cellulitis.  Doubt DVT and patient on Xarelto with negative recent testing.   2213 EKG: SR at 96 with poor R wave progression, normal ST-t, QTc 449.  LAD.  Similar to prior from June 9, 20025 2255 Procalcitonin: <0.05  Reassuring that no systemic infection.       Medications   furosemide (LASIX) injection 40 mg (40 mg Intravenous Given 6/26/25 2157)   potassium chloride (Klor-Con M20) CR tablet 40 mEq (40 mEq Oral Given 6/26/25  2226)   magnesium sulfate IVPB (premix) SOLN 1 g (0 g Intravenous Stopped 6/26/25 2332)       ED Risk Strat Scores                    No data recorded        SBIRT 22yo+      Flowsheet Row Most Recent Value   Initial Alcohol Screen: US AUDIT-C     1. How often do you have a drink containing alcohol? 0 Filed at: 06/26/2025 2133   2. How many drinks containing alcohol do you have on a typical day you are drinking?  0 Filed at: 06/26/2025 2133   3b. FEMALE Any Age, or MALE 65+: How often do you have 4 or more drinks on one occassion? 0 Filed at: 06/26/2025 2133   Audit-C Score 0 Filed at: 06/26/2025 2133   OLEG: How many times in the past year have you...    Used an illegal drug or used a prescription medication for non-medical reasons? Never Filed at: 06/26/2025 2133                            History of Present Illness       Chief Complaint   Patient presents with    Foot Pain     Pt arrived via ems with c/o right foot pain. Pt was recently admitted for cellulitis       Past Medical History[1]   Past Surgical History[2]   Family History[3]   Social History[4]   E-Cigarette/Vaping    E-Cigarette Use Current Some Day User     Comments medical THC       E-Cigarette/Vaping Substances    Nicotine No     THC Yes     CBD No     Flavoring No     Other No     Unknown No       I have reviewed and agree with the history as documented.     Patient reports right foot pain today.  She notes that she was just hospitalized for her cellulitis of her calfs and she notes her calf no longer has any pain or tenderness.  She notes that since discharge she began using compression stockings but she could not get it over her right foot because she had a surgical dressing on it so she was only using on her left side.  She notes her left leg has less swelling since using the compression stockings, but her right leg seems more swollen.  She denies fevers or chills.            Review of Systems   All other systems reviewed and are  negative.          Objective       ED Triage Vitals [06/26/25 2133]   Temperature Pulse Blood Pressure Respirations SpO2 Patient Position - Orthostatic VS   98.7 °F (37.1 °C) (!) 108 147/85 18 95 % Lying      Temp Source Heart Rate Source BP Location FiO2 (%) Pain Score    Oral Monitor Left arm -- --      Vitals      Date and Time Temp Pulse SpO2 Resp BP Pain Score FACES Pain Rating User   06/26/25 2300 -- 105 95 % 18 135/63 -- -- [place]   06/26/25 2200 -- 101 95 % 18 145/65 -- -- [place]   06/26/25 2133 98.7 °F (37.1 °C) 108 95 % 18 147/85 -- -- [place]            Physical Exam  Vitals and nursing note reviewed.   Constitutional:       General: She is not in acute distress.     Appearance: She is well-developed.   HENT:      Head: Normocephalic and atraumatic.     Eyes:      Conjunctiva/sclera: Conjunctivae normal.       Cardiovascular:      Rate and Rhythm: Normal rate and regular rhythm.      Heart sounds: No murmur heard.  Pulmonary:      Effort: Pulmonary effort is normal. No respiratory distress.      Breath sounds: Normal breath sounds.   Abdominal:      Palpations: Abdomen is soft.      Tenderness: There is no abdominal tenderness.     Musculoskeletal:         General: No swelling.      Cervical back: Neck supple.      Right lower leg: Edema present.      Left lower leg: Edema present.      Comments: Right greater than left pedal edema     Skin:     General: Skin is warm and dry.      Capillary Refill: Capillary refill takes less than 2 seconds.      Comments: Skin changes of both calfs consistent with chronic venous stasis dermatitis.     Neurological:      Mental Status: She is alert.     Psychiatric:         Mood and Affect: Mood normal.         Results Reviewed       Procedure Component Value Units Date/Time    Procalcitonin [[id number]]  (Normal) Collected: 06/26/25 2153    Lab Status: Final result Specimen: Blood from Arm, Right Updated: 06/26/25 2247     Procalcitonin <0.05 ng/ml     Basic metabolic panel  [[id number]]  (Abnormal) Collected: 06/26/25 2153    Lab Status: Final result Specimen: Blood from Arm, Right Updated: 06/26/25 2214     Sodium 141 mmol/L      Potassium 3.4 mmol/L      Chloride 106 mmol/L      CO2 26 mmol/L      ANION GAP 9 mmol/L      BUN 7 mg/dL      Creatinine 0.54 mg/dL      Glucose 103 mg/dL      Calcium 9.1 mg/dL      eGFR 108 ml/min/1.73sq m     Narrative:      National Kid[employer] Foundation guidelines for Chronic Kidney Disease (CKD):     Stage 1 with normal or high GFR (GFR > 90 mL/min/1.73 square meters)    Stage 2 Mild CKD (GFR = 60-89 mL/min/1.73 square meters)    Stage 3A Moderate CKD (GFR = 45-59 mL/min/1.73 square meters)    Stage 3B Moderate CKD (GFR = 30-44 mL/min/1.73 square meters)    Stage 4 Severe CKD (GFR = 15-29 mL/min/1.73 square meters)    Stage 5 End Stage CKD (GFR <15 mL/min/1.73 square meters)  Note: GFR calculation is accurate only with a steady state creatinine    Magnesium [[id number]]  (Abnormal) Collected: 06/26/25 2153    Lab Status: Final result Specimen: Blood from Arm, Right Updated: 06/26/25 2214     Magnesium 1.8 mg/dL     CBC and differential [[id number]]  (Abnormal) Collected: 06/26/25 2153    Lab Status: Final result Specimen: Blood from Arm, Right Updated: 06/26/25 2158     WBC 11.27 Thousand/uL      RBC 4.52 Million/uL      Hemoglobin 12.6 g/dL      Hematocrit 41.4 %      MCV 92 fL      MCH 27.9 pg      MCHC 30.4 g/dL      RDW 15.9 %      MPV 10.1 fL      Platelets 247 Thousands/uL      nRBC 0 /100 WBCs      Segmented % 70 %      Immature Grans % 1 %      Lymphocytes % 19 %      Monocytes % 9 %      Eosinophils Relative 1 %      Basophils Relative 0 %      Absolute Neutrophils 7.91 Thousands/µL      Absolute Immature Grans 0.08 Thousand/uL      Absolute Lymphocytes 2.08 Thousands/µL      Absolute Monocytes 1.06 Thousand/µL      Eosinophils Absolute 0.09 Thousand/µL      Basophils Absolute 0.05 Thousands/µL             No orders to display        Procedures    ED Medication and Procedure Management   Prior to Admission Medications   Prescriptions Last Dose Informant Patient Reported? Taking?   Aciphex 20 MG tablet  Self Yes No   Sig: Take by mouth as needed   Cholecalciferol (Vitamin D3) 125 MCG (5000 UT) capsule  Self No No   Sig: Take 1 capsule (5,000 Units total) by mouth daily   Invega Trinza 819 MG/2.63ML DWIGHT   Yes No   OXcarbazepine (TRILEPTAL) 150 mg tablet  Self No No   Sig: Take 3 tablets (450 mg total) by mouth every 12 (twelve) hours   albuterol (PROVENTIL HFA,VENTOLIN HFA) 90 mcg/act inhaler   Yes No   Sig: Inhale 2 puffs every 6 (six) hours as needed for wheezing   atoMOXetine (STRATTERA) 60 mg capsule  Self Yes No   Sig: Take 60 mg by mouth in the morning.   atorvastatin (LIPITOR) 80 mg tablet   No No   Sig: Take 1 tablet (80 mg total) by mouth daily   benztropine (COGENTIN) 1 mg tablet   Yes No   buPROPion (WELLBUTRIN XL) 150 mg 24 hr tablet  Self Yes No   buPROPion (WELLBUTRIN XL) 300 mg 24 hr tablet   Yes No   cephalexin (KEFLEX) 500 mg capsule   No No   Sig: Take 1 capsule (500 mg total) by mouth every 8 (eight) hours for 7 days   cloNIDine (CATAPRES) 0.3 mg tablet   No No   Sig: Take 1 tablet (0.3 mg total) by mouth 2 (two) times a day   cyanocobalamin (VITAMIN B-12) 1000 MCG tablet  Self No No   Sig: Take 1 tablet (1,000 mcg total) by mouth daily   furosemide (LASIX) 20 mg tablet   No No   Sig: TAKE 1 TABLET BY MOUTH DAILY   levocetirizine (XYZAL) 5 MG tablet  Self No No   Sig: TAKE ONE TABLET BY MOUTH IN THE EVENING DAILY   levothyroxine 125 mcg tablet   No No   Sig: TAKE 1 TABLET BY MOUTH DAILY IN THE EARLY MORNING   losartan (COZAAR) 100 MG tablet   No No   Sig: TAKE 1 TABLET BY MOUTH DAILY   metFORMIN (GLUCOPHAGE-XR) 750 mg 24 hr tablet   No No   Sig: TAKE 1 TABLET BY MOUTH DAILY WITH BREAKFAST   naltrexone (REVIA) 50 mg tablet  Self No No   Sig: TAKE 1 TABLET BY MOUTH DAILY   naproxen (Naprosyn) 500 mg tablet  Self No No    Sig: Take 1 tablet (500 mg total) by mouth 2 (two) times a day with meals Take with food.   oxybutynin (DITROPAN) 5 mg tablet  Self No No   Sig: Take 1 tablet (5 mg total) by mouth 2 (two) times a day   pantoprazole (PROTONIX) 40 mg tablet  Self No No   Sig: Take 1 tablet (40 mg total) by mouth daily in the early morning   pilocarpine (SALAGEN) 5 mg tablet  Self No No   Sig: TAKE 1 TABLET (5 MG TOTAL) BY MOUTH THREE (THREE) TIMES A DAY   rOPINIRole (REQUIP) 1 mg tablet  Self Yes No   Sig: Take by mouth in the morning.   rivaroxaban (Xarelto) 20 mg tablet   No No   Sig: Take 1 tablet (20 mg total) by mouth daily with breakfast   topiramate (TOPAMAX) 50 MG tablet   No No   Sig: Take 3 tablets (150 mg total) by mouth 2 (two) times a day for 1 day, THEN 2 tablets (100 mg total) 2 (two) times a day for 1 day, THEN 1.5 tablets (75 mg total) 2 (two) times a day for 1 day, THEN 1 tablet (50 mg total) 2 (two) times a day for 1 day, THEN 0.5 tablets (25 mg total) 2 (two) times a day for 1 day, THEN 0.5 tablets (25 mg total) daily for 1 day.   traZODone (DESYREL) 50 mg tablet   No No   Sig: Take 1 tablet (50 mg total) by mouth daily at bedtime as needed for sleep (insomnia)   trospium chloride (SANCTURA) 20 mg tablet  Self Yes No   Sig: Take 20 mg by mouth in the morning and 20 mg in the evening.      Facility-Administered Medications: None     Discharge Medication List as of 6/26/2025 10:59 PM        CONTINUE these medications which have NOT CHANGED    Details   Aciphex 20 MG tablet Take by mouth as needed, Starting Thu 8/8/2024, Historical Med      albuterol (PROVENTIL HFA,VENTOLIN HFA) 90 mcg/act inhaler Inhale 2 puffs every 6 (six) hours as needed for wheezing, Historical Med      atoMOXetine (STRATTERA) 60 mg capsule Take 60 mg by mouth in the morning., Starting Wed 3/6/2024, Historical Med      atorvastatin (LIPITOR) 80 mg tablet Take 1 tablet (80 mg total) by mouth daily, Starting Tue 3/4/2025, Normal      benztropine  (COGENTIN) 1 mg tablet Historical Med      !! buPROPion (WELLBUTRIN XL) 150 mg 24 hr tablet Historical Med      !! buPROPion (WELLBUTRIN XL) 300 mg 24 hr tablet Historical Med      cephalexin (KEFLEX) 500 mg capsule Take 1 capsule (500 mg total) by mouth every 8 (eight) hours for 7 days, Starting Tue 6/24/2025, Until Tue 7/1/2025, Normal      Cholecalciferol (Vitamin D3) 125 MCG (5000 UT) capsule Take 1 capsule (5,000 Units total) by mouth daily, Starting Thu 4/4/2024, Normal      cloNIDine (CATAPRES) 0.3 mg tablet Take 1 tablet (0.3 mg total) by mouth 2 (two) times a day, Starting Thu 2/20/2025, Normal      cyanocobalamin (VITAMIN B-12) 1000 MCG tablet Take 1 tablet (1,000 mcg total) by mouth daily, Starting Thu 4/4/2024, Until Wed 3/12/2025, Normal      furosemide (LASIX) 20 mg tablet TAKE 1 TABLET BY MOUTH DAILY, Starting Wed 3/5/2025, Normal      Invega Trinza 819 MG/2.63ML DWIGHT Historical Med      levocetirizine (XYZAL) 5 MG tablet TAKE ONE TABLET BY MOUTH IN THE EVENING DAILY, Starting Wed 2/19/2025, Normal      levothyroxine 125 mcg tablet TAKE 1 TABLET BY MOUTH DAILY IN THE EARLY MORNING, Starting Wed 3/5/2025, Normal      losartan (COZAAR) 100 MG tablet TAKE 1 TABLET BY MOUTH DAILY, Starting Wed 3/5/2025, Normal      metFORMIN (GLUCOPHAGE-XR) 750 mg 24 hr tablet TAKE 1 TABLET BY MOUTH DAILY WITH BREAKFAST, Starting Wed 3/5/2025, Normal      naltrexone (REVIA) 50 mg tablet TAKE 1 TABLET BY MOUTH DAILY, Starting Wed 2/19/2025, Normal      naproxen (Naprosyn) 500 mg tablet Take 1 tablet (500 mg total) by mouth 2 (two) times a day with meals Take with food., Starting Thu 10/3/2024, Normal      OXcarbazepine (TRILEPTAL) 150 mg tablet Take 3 tablets (450 mg total) by mouth every 12 (twelve) hours, Starting Tue 1/30/2024, Until Mon 5/5/2025, Normal      oxybutynin (DITROPAN) 5 mg tablet Take 1 tablet (5 mg total) by mouth 2 (two) times a day, Starting u 4/4/2024, Until Mon 5/5/2025, Normal      pantoprazole  (PROTONIX) 40 mg tablet Take 1 tablet (40 mg total) by mouth daily in the early morning, Starting Thu 4/4/2024, Normal      pilocarpine (SALAGEN) 5 mg tablet TAKE 1 TABLET (5 MG TOTAL) BY MOUTH THREE (THREE) TIMES A DAY, Normal      rivaroxaban (Xarelto) 20 mg tablet Take 1 tablet (20 mg total) by mouth daily with breakfast, Starting Fri 6/20/2025, Normal      rOPINIRole (REQUIP) 1 mg tablet Take by mouth in the morning., Historical Med      topiramate (TOPAMAX) 50 MG tablet Multiple Dosages:Starting Fri 6/6/2025, Until Fri 6/6/2025 at 2359, THEN Starting Sat 6/7/2025, Until Sat 6/7/2025 at 2359, THEN Starting Sun 6/8/2025, Until Sun 6/8/2025 at 2359, THEN Starting Mon 6/9/2025, Until Mon 6/9/2025 at 2359, THEN Starting  Tue 6/10/2025, Until Tue 6/10/2025 at 2359, THEN Starting Wed 6/11/2025, Until Wed 6/11/2025 at 2359Take 3 tablets (150 mg total) by mouth 2 (two) times a day for 1 day, THEN 2 tablets (100 mg total) 2 (two) times a day for 1 day, THEN 1.5 tablets (75  mg total) 2 (two) times a day for 1 day, THEN 1 tablet (50 mg total) 2 (two) times a day for 1 day, THEN 0.5 tablets (25 mg total) 2 (two) times a day for 1 day, THEN 0.5 tablets (25 mg total) daily for 1 day., Normal      traZODone (DESYREL) 50 mg tablet Take 1 tablet (50 mg total) by mouth daily at bedtime as needed for sleep (insomnia), Starting Fri 6/6/2025, Normal      trospium chloride (SANCTURA) 20 mg tablet Take 20 mg by mouth in the morning and 20 mg in the evening., Starting Mon 7/8/2024, Until Tue 7/8/2025, Historical Med       !! - Potential duplicate medications found. Please discuss with provider.        No discharge procedures on file.  ED SEPSIS DOCUMENTATION   Time reflects when diagnosis was documented in both MDM as applicable and the Disposition within this note       Time User Action Codes Description Comment    6/26/2025 10:58 PM David Phelps [I89.0] Lymphedema     6/26/2025 10:58 PM David Phelps [M79.671] Right  "foot pain                    [1]   Past Medical History:  Diagnosis Date    Acute deep vein thrombosis of lower leg, left (HCC) 10/16/2023    Acute heart failure, unspecified heart failure type ([facility]) 05/06/2024    Anxiety     Arthritis     oa cassandra knees    Asthma     good control- no medications    Yan's esophagus     Bipolar affective disorder (HCC)     Cannabis abuse with unspecified 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[facility])     Chronic pain disorder     lumbar    Contusion of elbow 12/21/2021    COPD (chronic obstructive pulmonary disease) ([facility])     CPAP (continuous positive airway pressure) dependence     DDD (degenerative disc disease), lumbar 04/09/2015    Degenerative disc disease at L5-S1 level     Deliberate self-cutting     9/15/22per pt has not done recently-- does see a therapist and psychiatrist regularly    Depression 09/16/2008    Diarrhea 01/06/2022    Disease of thyroid gland     hypo    MARTINEZ (dyspnea on exertion)     per pt \"has had this with exertion and not new\"    Drug overdose 10/28/2008    suicide attempt and again drug overdose 7/2022--[facility]-Medical floor and than transferred to [facility]    Dysphagia     Dyspnea     Ecchymosis 01/06/2022    Edema     BLE    Elevated troponin 09/02/2023    Family history of blood clots     GERD (gastroesophageal reflux disease)     Grief 11/11/2023    Headache(784.0)     Headache, tension-type     Hemorrhage of rectum and anus 10/02/2017    Formatting of this note might be different from the original.  Added automatically from request for surgery [id number]    High blood pressure     History of COVID-19 12/30/2020    Symptoms started on 12/30/2020 and then got worse.  Today she is feeling a little bit better.  She is a candidate for monoclonal antibody infusion and set for 1/6/21 @ 1pm at [facility]. 01/07/21 - telemedicine -     History of kidney stones     Hyperlipidemia     Hypertension     Ingrown toenail 12/02/2023    Intentional overdose (HCC) 09/27/2023    " Intentional self-harm by unspecified sharp object, sequela (HCC) 02/09/2023    Irritable bowel syndrome     Knee pain, bilateral     Especially right    Knee pain, right 02/23/2022    Medial epicondylitis of elbow, left 04/03/2018    Formatting of this note might be different from the original.  Added automatically from request for surgery [id number]    Medial epicondylitis of right elbow 07/17/2023    Medical marijuana use     Memory difficulties 08/06/2020    Memory loss     Migraines     Muscle weakness     legs    Obesity     Other psychoactive substance abuse with unspecified psychoactive substance-induced disorder (HCC) 05/06/2024    Overactive bladder     Polysubstance abuse (HCC) 09/29/2023    Potential for cognitive impairment 06/17/2021    Primary osteoarthritis of both knees 08/08/2018    Pulmonary emboli (HCC)     Restrictive lung disease 02/01/2017    Last Assessment & Plan:   Formatting of this note might be different from the original.  I reviewed this problem throughout the rest of the note. Please refer to the other assessments for details.    Risk for falls     Sjogren's disease (HCC)     Sleep apnea     Stress incontinence     Suicidal deliberate poisoning ([facility]) 07/13/2022    Suicidal ideations     Teeth missing     Toxic encephalopathy 11/08/2023    Tremor     per pt Tremors of Right Leg and both Arms    Visual impairment     Wears glasses    [2]   Past Surgical History:  Procedure Laterality Date    BREAST CYST EXCISION Right 1989    CARPAL TUNNEL RELEASE Left     CHOLECYSTECTOMY  05/2003    Laparoscopic    COLONOSCOPY      01/12/2009    DILATION AND CURETTAGE OF UTERUS      ELBOW SURGERY Left     x2. No hardware    ESOPHAGOGASTRODUODENOSCOPY      FOOT SURGERY Left     Plantar fasciotomy    HERNIA REPAIR      HYSTERECTOMY      laporoscopic, partial    KNEE ARTHROSCOPY Bilateral     OOPHORECTOMY Left 10/2015    RI CORRJ HLX VLGS BNCTY [facility] JOINT ARTHRODESIS Right 8/2/2024    Procedure: RIGHT FOOT  LAPIDUS BUNIONECTOMY, 2ND HAMMERTOE REPAIR, SECOND METATARSAL OSTEOTOMY WITH SECOND PLANTAR PLATE REPAIR;  Surgeon: Kaz Bautista DPM;  Location: [facility] MAIN OR;  Service: Podiatry    WA GASTROCNEMIUS RECESSION Left 02/24/2021    Procedure: RECESSION GASTROC OPEN;  Surgeon: Nomi Yang DPM;  Location:  MAIN OR;  Service: Podiatry    WA REMOVAL IMPLANT DEEP Right 5/14/2025    Procedure: FOOT REMOVAL OF PAINFUL HARDWARE, REVISION OF SCAR;  Surgeon: Kaz Bautista DPM;  Location: [facility] MAIN OR;  Service: Podiatry    WA RPR UMBILICAL HRNA 5 YRS/> REDUCIBLE N/A 04/24/2019    Procedure: REPAIR HERNIA UMBILICAL LAPAROSCOPIC W/ ROBOTICS;  Surgeon: Anahi Colon MD;  Location: [facility] Main OR;  Service: General    WA SPHINCTEROTOMY ANAL DIVISION SPHINCTER SPX N/A 08/31/2018    Procedure: EUA, LEFT PARTIAL INTERNAL SPHINCTEROTOMY;  Surgeon: Tien Reyes MD;  Location: [facility] MAIN OR;  Service: Colorectal    WA TNOT ELBOW LATERAL/MEDIAL DEBRIDE OPEN TDN RPR Left 09/20/2022    Procedure: RELEASE EPICONDYLAR ELBOW MEDIAL;  Surgeon: Kyree Winkler MD;  [facility] MAIN OR;  Service: Orthopedics    REDUCTION MAMMAPLASTY Bilateral 1999    REPAIR LACERATION Left     left hand-5/18/2009    REPLACEMENT TOTAL KNEE Right     ROTATOR CUFF REPAIR Left     TONSILECTOMY AND ADNOIDECTOMY      US GUIDED MSK PROCEDURE  04/22/2021    VASCULAR SURGERY      VEIN LIGATION Bilateral     WISDOM TOOTH EXTRACTION     [3]   Family History  Problem Relation Name Age of Onset    Kidney cancer Mother anastacio     Diabetes Mother anastacio     Depression Mother anastacio     Stroke Mother anastacio     Arthritis Mother anastacio     Cancer Mother anastacio     Psychiatric Illness Mother anastacio     No Known Problems Father      No Known Problems Sister      No Known Problems Maternal Grandmother      No Known Problems Maternal Grandfather      No Known Problems Paternal Grandmother      No Known Problems Paternal Grandfather      Colon cancer  Maternal Uncle      Colon cancer Maternal Uncle      Colon cancer Paternal Uncle      Colon cancer Family fx     Alcohol abuse Sister bharti     Asthma Sister bharti    [4]   Social History  Tobacco Use    Smoking status: Former     Current packs/day: 0.00     Average packs/day: 2.0 packs/day for 33.0 years (66.0 ttl pk-yrs)     Types: Cigarettes     Start date: 1985     Quit date: 2018     Years since quittin.4    Smokeless tobacco: Never    Tobacco comments:     Started at age 16.   Vaping Use    Vaping status: Some Days    Substances: THC   Substance Use Topics    Alcohol use: Not Currently     Comment: Recovering alcoholic    Drug use: Yes     Types: Marijuana     Comment: Former meth use, medicinal marijuana(medical card)        David Phelps MD  25 05

## 2025-06-27 NOTE — ED NOTES
Discharge reviewed with patient. Patient verbalized understanding and no further questions at this time. Patient ambulatory off unit with steady gait.      Melissa Collazo  06/26/25 [id number]

## 2025-07-01 ENCOUNTER — OFFICE VISIT (OUTPATIENT)
Dept: FAMILY MEDICINE CLINIC | Facility: CLINIC | Age: 55
End: 2025-07-01
Payer: COMMERCIAL

## 2025-07-01 VITALS
RESPIRATION RATE: 20 BRPM | TEMPERATURE: 97.8 F | WEIGHT: 292.8 LBS | SYSTOLIC BLOOD PRESSURE: 128 MMHG | BODY MASS INDEX: 47.26 KG/M2 | DIASTOLIC BLOOD PRESSURE: 76 MMHG | HEART RATE: 86 BPM | OXYGEN SATURATION: 93 %

## 2025-07-01 DIAGNOSIS — L03.119 CELLULITIS OF LOWER EXTREMITY, UNSPECIFIED LATERALITY: ICD-10-CM

## 2025-07-01 DIAGNOSIS — Z92.89 HOSPITALIZATION WITHIN LAST 30 DAYS: Primary | ICD-10-CM

## 2025-07-01 DIAGNOSIS — R60.0 BILATERAL LEG EDEMA: ICD-10-CM

## 2025-07-01 PROCEDURE — 99215 OFFICE O/P EST HI 40 MIN: CPT

## 2025-07-01 NOTE — PROGRESS NOTES
Transition of Care Visit:  Name: Blanca Mason      : 1970      MRN: [id number]  Encounter Provider: JHONATAN Armando  Encounter Date: 2025   Encounter department: [facility]    Assessment & Plan  Hospitalization within last 30 days         Cellulitis of lower extremity, unspecified laterality  Hospitalized - treated with ancef for possible cellulitis.  Discharged on oral antibiotics.           Bilateral leg edema  History of lymphedema maintained on lasix daily   Status post IV Lasix x 2   Restart home PO Lasix 20 mg   Elevate when able  Compression stockings             History of Present Illness     Transitional Care Management Review:   Blanca Mason is a 54 y.o. female here for TCM follow up.     During the TCM phone call patient stated:  TCM Call (since 2025)       Date and time call was made  2025 12:16 PM    Patient was hospitialized at  [facility]    Date of Admission  25    Date of discharge  25    Disposition  Home    Were the patients medications reviewed and updated  Yes    Current Symptoms  None          TCM Call (since 2025)       Post hospital issues  None    Scheduled for follow up?  Yes    Did you obtain your prescribed medications  Yes    Do you need help managing your prescriptions or medications  No    Is transportation to your appointment needed  No    I have advised the patient to call PCP with any new or worsening symptoms  Maria De Jesus Bryant CCMA    Living Arrangements  Alone    Support System  Family    The type of support provided  Emotional; Physical; Financial    Do you have social support  Yes, as much as I need          Admission dates 2025 - 2025  Primary diagnosis: Cellulitis of lower extremities   Patient initially presented after having lower extremity swelling and erythema.  She was previously diagnosed with cellulitis and was not compliant with her Keflex antibiotics that was  prescribed.  She presented for worsening cellulitic infection.  She underwent x-rays which showed no obvious signs of osteomyelitis.  Patient was started on IV Ancef and had improvement.  She was ultimately medically stable to be discharged back on p.o. Keflex regimen on 6/24.       Review of Systems   Constitutional:  Negative for activity change, chills, fatigue and fever.   HENT:  Negative for congestion, ear pain, rhinorrhea, sore throat and trouble swallowing.    Eyes:  Negative for pain and visual disturbance.   Respiratory:  Negative for cough, chest tightness and shortness of breath.    Cardiovascular:  Negative for chest pain, palpitations and leg swelling.   Gastrointestinal:  Negative for abdominal pain, constipation, diarrhea, nausea and vomiting.   Genitourinary:  Negative for difficulty urinating, dysuria, hematuria and urgency.   Musculoskeletal:  Negative for arthralgias and back pain.   Skin:  Negative for color change and rash.   Neurological:  Negative for dizziness, seizures, syncope and headaches.   Psychiatric/Behavioral:  Negative for dysphoric mood. The patient is not nervous/anxious.    All other systems reviewed and are negative.    Objective   /76 (BP Location: Left arm, Patient Position: Sitting, Cuff Size: Large)   Pulse 86   Temp 97.8 °F (36.6 °C) (Temporal)   Resp 20   Wt 133 kg (292 lb 12.8 oz)   SpO2 93%   BMI 47.26 kg/m²     Physical Exam  Vitals and nursing note reviewed.   Constitutional:       General: She is not in acute distress.     Appearance: Normal appearance. She is well-developed and normal weight.   HENT:      Head: Normocephalic and atraumatic.      Right Ear: Tympanic membrane, ear canal and external ear normal. There is no impacted cerumen.      Left Ear: Tympanic membrane, ear canal and external ear normal. There is no impacted cerumen.      Nose: Nose normal.      Mouth/Throat:      Mouth: Mucous membranes are moist.      Pharynx: Oropharynx is clear.      Eyes:      Extraocular Movements: Extraocular movements intact.      Conjunctiva/sclera: Conjunctivae normal.      Pupils: Pupils are equal, round, and reactive to light.       Cardiovascular:      Rate and Rhythm: Normal rate and regular rhythm.      Pulses: Normal pulses.      Heart sounds: Normal heart sounds. No murmur heard.  Pulmonary:      Effort: Pulmonary effort is normal. No respiratory distress.      Breath sounds: Normal breath sounds.   Abdominal:      General: Bowel sounds are normal.      Palpations: Abdomen is soft.      Tenderness: There is no abdominal tenderness.     Musculoskeletal:         General: Normal range of motion.      Cervical back: Normal range of motion and neck supple.      Right lower leg: No edema.      Left lower leg: No edema.     Skin:     General: Skin is warm and dry.      Capillary Refill: Capillary refill takes less than 2 seconds.     Neurological:      General: No focal deficit present.      Mental Status: She is alert and oriented to person, place, and time. Mental status is at baseline.     Psychiatric:         Mood and Affect: Mood normal.         Behavior: Behavior normal.         Thought Content: Thought content normal.         Judgment: Judgment normal.       Medications have been reviewed by provider in current encounter    Administrative Statements   I have spent a total time of 40 minutes in caring for this patient on the day of the visit/encounter including Diagnostic results, Prognosis, Risks and benefits of tx options, Instructions for management, Patient and family education, Importance of tx compliance, Risk factor reductions, Impressions, Counseling / Coordination of care, Documenting in the medical record, Reviewing/placing orders in the medical record (including tests, medications, and/or procedures), and Obtaining or reviewing history  .    Patient Instructions   Patient Education     Cellulitis (skin infection) in adults - Discharge instructions    The Basics   Written by the doctors and editors at [facility]   What are discharge instructions? -- Discharge instructions are information about how to take care of yourself after getting medical care for a health problem.  What is cellulitis? -- Cellulitis is a skin infection (figure 1). It can happen when germs get into the skin. Normally, different types of germs live on a person's skin. Most of the time, these germs do not cause any problems. But if a person gets a cut or a break in the skin, the germs can get into the skin and cause an infection.  You need antibiotics to treat cellulitis. Usually, this involves taking antibiotic pills. Just putting antibiotic ointment on the skin does not work. It is important to take all of your antibiotics even if you start to feel better.  How do I care for myself at home? -- Ask the doctor or nurse what you should do when you go home. Make sure that you understand exactly what you need to do to care for yourself. Ask questions if there is anything you do not understand.  You should also:   Prop your painful body part on pillows, keeping it above the level of your heart. This might help lessen pain and swelling.   Keep the infected area clean and dry. Do not squeeze, scratch, or rub it. You can gently wash the area with soap and water or take a shower. Pat the area dry with a clean towel.   Wash your hands before and after you touch the infected area.  When should I call the doctor? -- Call for advice if:   You have a fever of 101°F (38.4°C) or higher, or chills.   The area becomes more red, swollen, or painful, or the redness or swelling spreads up your leg or arm or to a larger area.   The infected area is not better after 3 days of taking antibiotics.  All topics are updated as new evidence becomes available and our peer review process is complete.  This topic retrieved from [employer] on: Mar 06, 2024.  Topic 877091 Version 1.0  Release: 32.2.4 - C32.64  © 2024 [facility]  [employer] and/or its affiliates. All rights reserved.  figure 1: Cellulitis     Cellulitis is an infection of the skin. These infections can cause redness, pain, and/or swelling.  Graphic 901601 Version 1.0  Consumer Information Use and Disclaimer   Disclaimer: This generalized information is a limited summary of diagnosis, treatment, and/or medication information. It is not meant to be comprehensive and should be used as a tool to help the user understand and/or assess potential diagnostic and treatment options. It does NOT include all information about conditions, treatments, medications, side effects, or risks that may apply to a specific patient. It is not intended to be medical advice or a substitute for the medical advice, diagnosis, or treatment of a health care provider based on the health care provider's examination and assessment of a patient's specific and unique circumstances. Patients must speak with a health care provider for complete information about their health, medical questions, and treatment options, including any risks or benefits regarding use of medications. This information does not endorse any treatments or medications as safe, effective, or approved for treating a specific patient. [employer], Inc[employer] and its affiliates disclaim any warranty or liability relating to this information or the use thereof.The use of this information is governed by the Terms of Use, available at https://www.wolterskluwer.com/en/know/clinical-effectiveness-terms. 2024© [employer], Inc. and its affiliates and/or licensors. All rights reserved.  Copyright   © 2024 [employer], Inc. and/or its affiliates. All rights reserved.

## 2025-07-01 NOTE — PATIENT INSTRUCTIONS
Patient Education     Cellulitis (skin infection) in adults - Discharge instructions   The Basics   Written by the doctors and editors at [facility]   What are discharge instructions? -- Discharge instructions are information about how to take care of yourself after getting medical care for a health problem.  What is cellulitis? -- Cellulitis is a skin infection (figure 1). It can happen when germs get into the skin. Normally, different types of germs live on a person's skin. Most of the time, these germs do not cause any problems. But if a person gets a cut or a break in the skin, the germs can get into the skin and cause an infection.  You need antibiotics to treat cellulitis. Usually, this involves taking antibiotic pills. Just putting antibiotic ointment on the skin does not work. It is important to take all of your antibiotics even if you start to feel better.  How do I care for myself at home? -- Ask the doctor or nurse what you should do when you go home. Make sure that you understand exactly what you need to do to care for yourself. Ask questions if there is anything you do not understand.  You should also:   Prop your painful body part on pillows, keeping it above the level of your heart. This might help lessen pain and swelling.   Keep the infected area clean and dry. Do not squeeze, scratch, or rub it. You can gently wash the area with soap and water or take a shower. Pat the area dry with a clean towel.   Wash your hands before and after you touch the infected area.  When should I call the doctor? -- Call for advice if:   You have a fever of 101°F (38.4°C) or higher, or chills.   The area becomes more red, swollen, or painful, or the redness or swelling spreads up your leg or arm or to a larger area.   The infected area is not better after 3 days of taking antibiotics.  All topics are updated as new evidence becomes available and our peer review process is complete.  This topic retrieved from [facility] on:  Mar 06, 2024.  Topic 986551 Version 1.0  Release: 32.2.4 - C32.64  © 2024 [employer], Inc. and/or its affiliates. All rights reserved.  figure 1: Cellulitis     Cellulitis is an infection of the skin. These infections can cause redness, pain, and/or swelling.  Graphic 226357 Version 1.0  Consumer Information Use and Disclaimer   Disclaimer: This generalized information is a limited summary of diagnosis, treatment, and/or medication information. It is not meant to be comprehensive and should be used as a tool to help the user understand and/or assess potential diagnostic and treatment options. It does NOT include all information about conditions, treatments, medications, side effects, or risks that may apply to a specific patient. It is not intended to be medical advice or a substitute for the medical advice, diagnosis, or treatment of a health care provider based on the health care provider's examination and assessment of a patient's specific and unique circumstances. Patients must speak with a health care provider for complete information about their health, medical questions, and treatment options, including any risks or benefits regarding use of medications. This information does not endorse any treatments or medications as safe, effective, or approved for treating a specific patient. [employer], Inc[employer] and its affiliates disclaim any warranty or liability relating to this information or the use thereof.The use of this information is governed by the Terms of Use, available at https://www.woltersZoonauwer.com/en/know/clinical-effectiveness-terms. 2024© [employer], Inc. and its affiliates and/or licensors. All rights reserved.  Copyright   © 2024 [employer], [employer]. and/or its affiliates. All rights reserved.

## 2025-07-01 NOTE — ASSESSMENT & PLAN NOTE
Hospitalized 6/4-6/6 treated with ancef for possible cellulitis.  Discharged on oral antibiotics.

## 2025-07-01 NOTE — ASSESSMENT & PLAN NOTE
History of lymphedema maintained on lasix daily   Status post IV Lasix x 2   Restart home PO Lasix 20 mg   Elevate when able  Compression stockings

## 2025-07-03 ENCOUNTER — OFFICE VISIT (OUTPATIENT)
Dept: SLEEP CENTER | Facility: CLINIC | Age: 55
End: 2025-07-03
Payer: COMMERCIAL

## 2025-07-03 VITALS
OXYGEN SATURATION: 98 % | WEIGHT: 290 LBS | BODY MASS INDEX: 46.61 KG/M2 | SYSTOLIC BLOOD PRESSURE: 126 MMHG | RESPIRATION RATE: 18 BRPM | DIASTOLIC BLOOD PRESSURE: 70 MMHG | HEIGHT: 66 IN

## 2025-07-03 DIAGNOSIS — G47.33 OSA (OBSTRUCTIVE SLEEP APNEA): Primary | ICD-10-CM

## 2025-07-03 DIAGNOSIS — F90.9 ATTENTION DEFICIT HYPERACTIVITY DISORDER (ADHD), UNSPECIFIED ADHD TYPE: ICD-10-CM

## 2025-07-03 DIAGNOSIS — F51.3 SLEEP WALKING AND EATING: ICD-10-CM

## 2025-07-03 DIAGNOSIS — G47.00 INSOMNIA, UNSPECIFIED TYPE: ICD-10-CM

## 2025-07-03 DIAGNOSIS — G47.19 EXCESSIVE DAYTIME SLEEPINESS: ICD-10-CM

## 2025-07-03 DIAGNOSIS — I26.99 PE (PULMONARY THROMBOEMBOLISM) (HCC): ICD-10-CM

## 2025-07-03 LAB
ATRIAL RATE: 96 BPM
P AXIS: 47 DEGREES
PR INTERVAL: 214 MS
QRS AXIS: -27 DEGREES
QRSD INTERVAL: 98 MS
QT INTERVAL: 356 MS
QTC INTERVAL: 449 MS
T WAVE AXIS: 69 DEGREES
VENTRICULAR RATE: 96 BPM

## 2025-07-03 PROCEDURE — 99214 OFFICE O/P EST MOD 30 MIN: CPT | Performed by: INTERNAL MEDICINE

## 2025-07-03 PROCEDURE — 93010 ELECTROCARDIOGRAM REPORT: CPT | Performed by: INTERNAL MEDICINE

## 2025-07-03 PROCEDURE — G2211 COMPLEX E/M VISIT ADD ON: HCPCS | Performed by: INTERNAL MEDICINE

## 2025-07-03 NOTE — PROGRESS NOTES
Name: Blanca Mason      : 1970      MRN: [id number]  Encounter Provider: Erasmo Jeffery MD  Encounter Date: 7/3/2025   Encounter department: [facility] SLEEP MEDICINE [place]  :  Assessment & Plan  MAG (obstructive sleep apnea)  History of severe MAG AHI 74.2 with intolerance to CPAP now on auto BiPAP.  Currently compliant with BiPAP and deriving some symptomatic benefit with better sleep quality and more energy during the day.    Compliance data:  24  97% use over past 30 days  Average use 7 hours 19 minutes  AirCurve 10  Max IPAP 25, min EPAP 12, PS4  Leaks median 0, 95% 1.5  Pressure median 16/12, 95th percentile 18/14  AHI 1.8   DME - [facility]    Complaince has significant decreased  7/3/25  83%, 27%>4 hours, 30 days  Average use 2 hours 50 minutes  BIPAP max IPAP 25, min EPAP 12, PS 4  Pressure median 16/12, P95 18/14  Leaks 95% 18.8  AHI 3.4, PRASANNA 1.3    Over the past year her compliance has decreased significantly.  She feels like the mask fit is not as good as prior.  This is despite losing weight more than 20 pounds since the last time I seen her.    I recommend she calls [facility] [place] for mask fitting  I decrease pressures to help with mask leaks and avoidance of overtightening the mask  Decrease pressures to max IPAP 18, minimum EPAP 8, pressure support 4.  Ramp pressure decreased to 8  Follow-up in 4 months  Orders:    PAP DME Pressure Change     Sleep walking and eating  Having some more episodes of sleep walking episodes  Finding food in the kitchen that she doesn't remember   Really need to get back on BIPAP to ensure that sleep apnea isn't causing parasomnias  Decrease sedating medications - talk to psychiatrist about decreasing Topamax and Trazodone       Excessive daytime sleepiness  Contributed by obstructive sleep apnea.  Other contributing factors include psychiatric medications including Topamax and Trileptal.  Her psychiatrist is actively trying to decrease Topamax dosing.        PE (pulmonary thromboembolism) (HCC)  On Xarelto for acute saddle PE in 9/2023  I agree with continuing Xarelto possibly for lifelong therapy       Insomnia, unspecified type  Prescribe trazodone due to sleep maintenance issues but I recommend holding this for now as it does not seem to be helping her with sleep maintenance and she is having parasomnias       Attention deficit hyperactivity disorder (ADHD), unspecified ADHD type  On Strattera per psychiatry.  She has been on doses ranging from 40 to 60 mg for many years.           History of Present Illness   HPI   54 year old female who has a past medical history of Sjogren's disease, HTN, GERD, Bipolar disease, Asthma, Yan's esophagus, COPD, prior methamphetamine use, who is presenting for follow-up of MAG    7/3/25 - Follow up -recently had a lot of hospitalizations revolving cervical foot surgery and cellulitis.  Her compliance for BiPAP is decreased significantly she takes off her mask frequently due to poor mask fit and leaks.  She is changing her masks regularly.  Currently she is unemployed which she thinks does not help with sleep quality and sleep maintenance.  She has however decreased her sugar intake, no longer eating a bowl of ice cream overnight and has lost 22 pounds since her last visit.  She reports over the past month several episodes of sleepwalking and perhaps sleep eating, finding food in the kitchen that she does not remember eating       2/22/24 - Recently admitted for 2 months for an overdose.  Occasional panic attacks overnight where she has take off mask and wait for it to resolve, which especially happens when it is hot in the bedroom.  However most nights she is able to tolerate BiPAP and benefit by having more energy during the day.  It's hard to say whether BiPAP is better than CPAP but she is tolerating it better.  She is sometimes tired during the day but she is coming off of methamphetamine use.  She is working on weight  loss.  She sleeps between 9-10pm and fall asleep easily.  Hard to stay asleep and wake up around 1am.  She sleeps about 5-6 hours on average.  [place] 18 but she usually does not doze off or nap during the day.       3/9/[id number] with Dr. Calle.  Due to difficulty breathing against the pressure of CPAP, switch to BiPAP.                  Review of Systems  Pertinent positives/negatives included in HPI and also as noted:     Past Medical History   Past Medical History[1]  Past Surgical History[2]  Family History[3]   reports that she quit smoking about 7 years ago. Her smoking use included cigarettes. She started smoking about 40 years ago. She has a 66 pack-year smoking history. She has never used smokeless tobacco. She reports that she does not currently use alcohol. She reports current drug use. Drug: Marijuana.  Current Outpatient Medications   Medication Instructions    Aciphex 20 MG tablet As needed    albuterol (PROVENTIL HFA,VENTOLIN HFA) 90 mcg/act inhaler 2 puffs, Every 6 hours PRN    atoMOXetine (STRATTERA) 60 mg, Daily    atorvastatin (LIPITOR) 80 mg, Oral, Daily    benztropine (COGENTIN) 1 mg tablet     buPROPion (WELLBUTRIN XL) 150 mg 24 hr tablet     buPROPion (WELLBUTRIN XL) 300 mg 24 hr tablet     cloNIDine (CATAPRES) 0.3 mg, Oral, 2 times daily    cyanocobalamin (VITAMIN B-12) 1,000 mcg, Oral, Daily    furosemide (LASIX) 20 mg, Oral, Daily    Invega Trinza 819 MG/2.63ML DWIGHT     levocetirizine (XYZAL) 5 mg, Oral, Every evening    levothyroxine 125 mcg, Oral, Daily (early morning)    losartan (COZAAR) 100 mg, Oral, Daily    metFORMIN (GLUCOPHAGE-XR) 750 mg, Oral, Daily with breakfast    naltrexone (REVIA) 50 mg, Oral, Daily    naproxen (NAPROSYN) 500 mg, Oral, 2 times daily with meals, Take with food.    OXcarbazepine (TRILEPTAL) 450 mg, Oral, Every 12 hours scheduled    oxybutynin (DITROPAN) 5 mg, Oral, 2 times daily    pantoprazole (PROTONIX) 40 mg, Oral, Daily (early morning)     pilocarpine (SALAGEN) 5 mg tablet TAKE 1 TABLET (5 MG TOTAL) BY MOUTH THREE (THREE) TIMES A DAY    rivaroxaban (XARELTO) 20 mg, Oral, Daily with breakfast    rOPINIRole (REQUIP) 1 mg tablet Daily    topiramate (TOPAMAX) 50 MG tablet Take 3 tablets (150 mg total) by mouth 2 (two) times a day for 1 day, THEN 2 tablets (100 mg total) 2 (two) times a day for 1 day, THEN 1.5 tablets (75 mg total) 2 (two) times a day for 1 day, THEN 1 tablet (50 mg total) 2 (two) times a day for 1 day, THEN 0.5 tablets (25 mg total) 2 (two) times a day for 1 day, THEN 0.5 tablets (25 mg total) daily for 1 day.    traZODone (DESYREL) 50 mg, Oral, Daily at bedtime PRN    trospium chloride (SANCTURA) 20 mg, 2 times daily    Vitamin D3 5,000 Units, Oral, Daily   Allergies[4]   Objective   There were no vitals taken for this visit.       Physical Exam  Constitutional:       General: She is not in acute distress.     Appearance: Normal appearance. She is not ill-appearing, toxic-appearing or diaphoretic.     Eyes:      General: No scleral icterus.     Extraocular Movements: Extraocular movements intact.       Cardiovascular:      Rate and Rhythm: Normal rate.   Pulmonary:      Effort: Pulmonary effort is normal. No respiratory distress.      Breath sounds: Normal breath sounds.   Abdominal:      Tenderness: There is no guarding.     Musculoskeletal:         General: Normal range of motion.      Cervical back: Normal range of motion and neck supple. No rigidity.      Right lower leg: No edema.      Left lower leg: No edema.     Neurological:      General: No focal deficit present.      Mental Status: She is alert and oriented to person, place, and time.       Visit Vitals  OB Status Hysterectomy   Smoking Status Former                                                 Data  Lab Results   Component Value Date    HGB 12.6 06/26/2025    HCT 41.4 06/26/2025    MCV 92 06/26/2025      Lab Results   Component Value Date    GLUCOSE 117 06/04/2025     "CALCIUM 9.1 06/26/2025    K 3.4 (L) 06/26/2025    CO2 26 06/26/2025     06/26/2025    BUN 7 06/26/2025    CREATININE 0.54 (L) 06/26/2025     No results found for: \"IRON\", \"TIBC\", \"FERRITIN\"  Lab Results   Component Value Date    AST 14 06/22/2025    ALT 10 06/22/2025              [1]   Past Medical History:  Diagnosis Date    Acute deep vein thrombosis of lower leg, left ([facility]) 10/16/2023    Acute heart failure, unspecified heart failure type ([facility]) 05/06/2024    Anxiety     Arthritis     oa cassandra knees    Asthma     good control- no medications    Yan's esophagus     Bipolar affective disorder ([facility])     Cannabis abuse with unspecified 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[facility])     Chronic pain disorder     lumbar    Contusion of elbow 12/21/2021    COPD (chronic obstructive pulmonary disease) ([facility])     CPAP (continuous positive airway pressure) dependence     DDD (degenerative disc disease), lumbar 04/09/2015    Degenerative disc disease at L5-S1 level     Deliberate self-cutting     9/15/22per pt has not done recently-- does see a therapist and psychiatrist regularly    Depression 09/16/2008    Diarrhea 01/06/2022    Disease of thyroid gland     hypo    MARTINEZ (dyspnea on exertion)     per pt \"has had this with exertion and not new\"    Drug overdose 10/28/2008    suicide attempt and again drug overdose 7/2022--[facility]Medical floor and than transferred to [facility]    Dysphagia     Dyspnea     Ecchymosis 01/06/2022    Edema     BLE    Elevated troponin 09/02/2023    Family history of blood clots     GERD (gastroesophageal reflux disease)     Grief 11/11/2023    Headache(784.0)     Headache, tension-type     Hemorrhage of rectum and anus 10/02/2017    Formatting of this note might be different from the original.  Added automatically from request for surgery [id number]    High blood pressure     History of COVID-19 12/30/2020    Symptoms started on 12/30/2020 and then got worse.  Today she is feeling a little bit better.  She is " a candidate for monoclonal antibody infusion and set for 1/6/21 @ 1pm at [facility]. 01/07/21 - telemedicine -     History of kidney stones     Hyperlipidemia     Hypertension     Ingrown toenail 12/02/2023    Intentional overdose (HCC) 09/27/2023    Intentional self-harm by unspecified sharp object, sequela (HCC) 02/09/2023    Irritable bowel syndrome     Knee pain, bilateral     Especially right    Knee pain, right 02/23/2022    Medial epicondylitis of elbow, left 04/03/2018    Formatting of this note might be different from the original.  Added automatically from request for surgery [id number]    Medial epicondylitis of right elbow 07/17/2023    Medical marijuana use     Memory difficulties 08/06/2020    Memory loss     Migraines     Muscle weakness     legs    Obesity     Other psychoactive substance abuse with unspecified psychoactive substance-induced disorder (HCC) 05/06/2024    Overactive bladder     Polysubstance abuse (HCC) 09/29/2023    Potential for cognitive impairment 06/17/2021    Primary osteoarthritis of both knees 08/08/2018    Pulmonary emboli (HCC)     Restrictive lung disease 02/01/2017    Last Assessment & Plan:   Formatting of this note might be different from the original.  I reviewed this problem throughout the rest of the note. Please refer to the other assessments for details.    Risk for falls     Sjogren's disease (HCC)     Sleep apnea     Stress incontinence     Suicidal deliberate poisoning (HCC) 07/13/2022    Suicidal ideations     Teeth missing     Toxic encephalopathy 11/08/2023    Tremor     per pt Tremors of Right Leg and both Arms    Visual impairment     Wears glasses    [2]   Past Surgical History:  Procedure Laterality Date    BREAST CYST EXCISION Right 1989    CARPAL TUNNEL RELEASE Left     CHOLECYSTECTOMY  05/2003    Laparoscopic    COLONOSCOPY      01/12/2009    DILATION AND CURETTAGE OF UTERUS      ELBOW SURGERY Left     x2. No hardware    ESOPHAGOGASTRODUODENOSCOPY       FOOT SURGERY Left     Plantar fasciotomy    HERNIA REPAIR      HYSTERECTOMY      laporoscopic, partial    KNEE ARTHROSCOPY Bilateral     OOPHORECTOMY Left 10/2015    MT CORRJ HLX VLGS BNCTY SESMDC JOINT ARTHRODESIS Right 8/2/2024    Procedure: RIGHT FOOT LAPIDUS BUNIONECTOMY, 2ND HAMMERTOE REPAIR, SECOND METATARSAL OSTEOTOMY WITH SECOND PLANTAR PLATE REPAIR;  Surgeon: Kaz Bautista DPM;  Location: [facility];  Service: Podiatry    MT GASTROCNEMIUS RECESSION Left 02/24/2021    Procedure: RECESSION GASTROC OPEN;  Surgeon: Nomi Yang DPM;  Location:  MAIN OR;  Service: Podiatry    MT REMOVAL IMPLANT DEEP Right 5/14/2025    Procedure: FOOT REMOVAL OF PAINFUL HARDWARE, REVISION OF SCAR;  Surgeon: Kaz Bautista DPM;  Location: [facility];  Service: Podiatry    MT RPR UMBILICAL HRNA 5 YRS/> REDUCIBLE N/A 04/24/2019    Procedure: REPAIR HERNIA UMBILICAL LAPAROSCOPIC W/ ROBOTICS;  Surgeon: Anahi Colon MD;  Location: [facility] Main OR;  Service: General    MT SPHINCTEROTOMY ANAL DIVISION SPHINCTER SPX N/A 08/31/2018    Procedure: EUA, LEFT PARTIAL INTERNAL SPHINCTEROTOMY;  Surgeon: Tien Reyes MD;  Location: [facility] OR;  Service: Colorectal    MT TNOT ELBOW LATERAL/MEDIAL DEBRIDE OPEN TDN RPR Left 09/20/2022    Procedure: RELEASE EPICONDYLAR ELBOW MEDIAL;  Surgeon: Kyree Winkler MD;  [facility];  Service: Orthopedics    REDUCTION MAMMAPLASTY Bilateral 1999    REPAIR LACERATION Left     left hand-5/18/2009    REPLACEMENT TOTAL KNEE Right     ROTATOR CUFF REPAIR Left     TONSILECTOMY AND ADNOIDECTOMY      US GUIDED MSK PROCEDURE  04/22/2021    VASCULAR SURGERY      VEIN LIGATION Bilateral     WISDOM TOOTH EXTRACTION     [3]   Family History  Problem Relation Name Age of Onset    Kidney cancer Mother anastacio     Diabetes Mother anastacio     Depression Mother anastacio     Stroke Mother anastacio     Arthritis Mother anastacio     Cancer Mother anastacio     Psychiatric Illness Mother anastacio   "   No Known Problems Father      No Known Problems Sister      No Known Problems Maternal Grandmother      No Known Problems Maternal Grandfather      No Known Problems Paternal Grandmother      No Known Problems Paternal Grandfather      Colon cancer Maternal Uncle      Colon cancer Maternal Uncle      Colon cancer Paternal Uncle      Colon cancer Family fx     Alcohol abuse Sister bharti     Asthma Sister bharti    [4]   Allergies  Allergen Reactions    Percocet [Oxycodone-Acetaminophen] Headache     Severe headaches   (denies issues with Tylenol)    Povidone Iodine Rash     Unsure if betadine or gauze post surgical. Got rash on leg.   Has  Had itchy rashes after every surgery prep and IV insertion    Chlorhexidine Rash     Per pt \"able to use the liquid soap--thinkd reaction from the sponges or wipes\"     "

## 2025-07-03 NOTE — ASSESSMENT & PLAN NOTE
On Xarelto for acute saddle PE in 9/2023  I agree with continuing Xarelto possibly for lifelong therapy

## 2025-07-03 NOTE — ASSESSMENT & PLAN NOTE
History of severe MAG AHI 74.2 with intolerance to CPAP now on auto BiPAP.  Currently compliant with BiPAP and deriving some symptomatic benefit with better sleep quality and more energy during the day.    Compliance data:  2/22/24  97% use over past 30 days  Average use 7 hours 19 minutes  AirCurve 10  Max IPAP 25, min EPAP 12, PS4  Leaks median 0, 95% 1.5  Pressure median 16/12, 95th percentile 18/14  AHI 1.8   DME - [facility] [place]    Complaince has significant decreased  7/3/25  83%, 27%>4 hours, 30 days  Average use 2 hours 50 minutes  BIPAP max IPAP 25, min EPAP 12, PS 4  Pressure median 16/12, P95 18/14  Leaks 95% 18.8  AHI 3.4, PRASANNA 1.3    Over the past year her compliance has decreased significantly.  She feels like the mask fit is not as good as prior.  This is despite losing weight more than 20 pounds since the last time I seen her.    I recommend she calls [facility] [place] for mask fitting  I decrease pressures to help with mask leaks and avoidance of overtightening the mask  Decrease pressures to max IPAP 18, minimum EPAP 8, pressure support 4.  Ramp pressure decreased to 8  Follow-up in 4 months  Orders:    PAP DME Pressure Change

## 2025-07-07 ENCOUNTER — TELEPHONE (OUTPATIENT)
Dept: SLEEP CENTER | Facility: CLINIC | Age: 55
End: 2025-07-07

## 2025-07-18 ENCOUNTER — OFFICE VISIT (OUTPATIENT)
Dept: FAMILY MEDICINE CLINIC | Facility: CLINIC | Age: 55
End: 2025-07-18
Payer: COMMERCIAL

## 2025-07-18 VITALS
WEIGHT: 283 LBS | HEIGHT: 66 IN | HEART RATE: 85 BPM | TEMPERATURE: 97.6 F | SYSTOLIC BLOOD PRESSURE: 118 MMHG | BODY MASS INDEX: 45.48 KG/M2 | OXYGEN SATURATION: 98 % | DIASTOLIC BLOOD PRESSURE: 70 MMHG

## 2025-07-18 DIAGNOSIS — G47.33 OSA (OBSTRUCTIVE SLEEP APNEA): Chronic | ICD-10-CM

## 2025-07-18 DIAGNOSIS — Z12.31 ENCOUNTER FOR SCREENING MAMMOGRAM FOR BREAST CANCER: ICD-10-CM

## 2025-07-18 DIAGNOSIS — R73.03 PREDIABETES: ICD-10-CM

## 2025-07-18 DIAGNOSIS — E03.9 HYPOTHYROIDISM, UNSPECIFIED TYPE: Chronic | ICD-10-CM

## 2025-07-18 DIAGNOSIS — K22.70 BARRETT'S ESOPHAGUS DETERMINED BY BIOPSY: Chronic | ICD-10-CM

## 2025-07-18 DIAGNOSIS — Z12.4 CERVICAL CANCER SCREENING: ICD-10-CM

## 2025-07-18 DIAGNOSIS — E78.2 MIXED HYPERLIPIDEMIA: ICD-10-CM

## 2025-07-18 DIAGNOSIS — I10 BENIGN ESSENTIAL HYPERTENSION: Chronic | ICD-10-CM

## 2025-07-18 DIAGNOSIS — Z00.00 MEDICARE ANNUAL WELLNESS VISIT, SUBSEQUENT: Primary | ICD-10-CM

## 2025-07-18 DIAGNOSIS — F60.3 BORDERLINE PERSONALITY DISORDER (HCC): ICD-10-CM

## 2025-07-18 PROCEDURE — G0439 PPPS, SUBSEQ VISIT: HCPCS

## 2025-07-18 PROCEDURE — G0537 PR RISK ASCVD TST ONCE PR 12 MO: HCPCS

## 2025-07-18 NOTE — ASSESSMENT & PLAN NOTE
Today's /70  goal bp 140/90, pt's bp is at goal  Current medication: Losartan 100mg, Lasix 20mg, Clonidine 0.3mg BID   Continue current medication regimen   Advised patient on symptoms of hypotension & severe HTN  Limit salt-intake & caffeine. DASH diet discussed, minimize stress level  Weight reduction efforts via improved diet & increased exercise

## 2025-07-18 NOTE — ASSESSMENT & PLAN NOTE
Wt Readings from Last 3 Encounters:   07/18/25 128 kg (283 lb)   07/03/25 132 kg (290 lb)   07/01/25 133 kg (292 lb 12.8 oz)

## 2025-07-18 NOTE — ASSESSMENT & PLAN NOTE
01/24 Lipid panel  Cholesterol 207, Triglycerides 162, , HDL 48  07/24 Lipid panel  Cholesterol 286, Triglycerides 230, , HDL 71  03/25 Lipid panel  Cholesterol 145, Triglycerides 190, LDL 56, HDL 51  Current Medications: Atorvastatin 80mg   Continue current medication regimen   Lifestyle modification: Incorporate regular exercise, avoid sugars and simple carbohydrates, weight loss and choosing healthier fats.

## 2025-07-18 NOTE — ASSESSMENT & PLAN NOTE
05/24 Last A1C  5.4  06/25 Current A1C  6.1  Current medications: Metformin 750mg  Continue current medication regimen

## 2025-07-18 NOTE — PATIENT INSTRUCTIONS
Medicare Preventive Visit Patient Instructions  Thank you for completing your Welcome to Medicare Visit or Medicare Annual Wellness Visit today. Your next wellness visit will be due in one year (7/19/2026).  The screening/preventive services that you may require over the next 5-10 years are detailed below. Some tests may not apply to you based off risk factors and/or age. Screening tests ordered at today's visit but not completed yet may show as past due. Also, please note that scanned in results may not display below.  Preventive Screenings:  Service Recommendations Previous Testing/Comments   Colorectal Cancer Screening  * Colonoscopy    * Fecal Occult Blood Test (FOBT)/Fecal Immunochemical Test (FIT)  * Fecal DNA/Cologuard Test  * Flexible Sigmoidoscopy Age: 45-75 years old   Colonoscopy: every 10 years (may be performed more frequently if at higher risk)  OR  FOBT/FIT: every 1 year  OR  Cologuard: every 3 years  OR  Sigmoidoscopy: every 5 years  Screening may be recommended earlier than age 45 if at higher risk for colorectal cancer. Also, an individualized decision between you and your healthcare provider will decide whether screening between the ages of 76-85 would be appropriate. Colonoscopy: 09/03/2020  FOBT/FIT: Not on file  Cologuard: Not on file  Sigmoidoscopy: Not on file    Screening Current     Breast Cancer Screening Age: 40+ years old  Frequency: every 1-2 years  Not required if history of left and right mastectomy Mammogram: 07/06/2023        Cervical Cancer Screening Between the ages of 21-29, pap smear recommended once every 3 years.   Between the ages of 30-65, can perform pap smear with HPV co-testing every 5 years.   Recommendations may differ for women with a history of total hysterectomy, cervical cancer, or abnormal pap smears in past. Pap Smear: 06/05/2024    Screening Current   Hepatitis C Screening Once for adults born between 1945 and 1965  More frequently in patients at high risk for  Hepatitis C Hep C Antibody: 07/10/2019    Screening Current   Diabetes Screening 1-2 times per year if you're at risk for diabetes or have pre-diabetes Fasting glucose: 112 mg/dL (3/25/2025)  A1C: 6.1 % (6/5/2025)  Screening Current   Cholesterol Screening Once every 5 years if you don't have a lipid disorder. May order more often based on risk factors. Lipid panel: 03/25/2025    Screening Not Indicated  History Lipid Disorder     Other Preventive Screenings Covered by Medicare:  Abdominal Aortic Aneurysm (AAA) Screening: covered once if your at risk. You're considered to be at risk if you have a family history of AAA.  Lung Cancer Screening: covers low dose CT scan once per year if you meet all of the following conditions: (1) Age 55-77; (2) No signs or symptoms of lung cancer; (3) Current smoker or have quit smoking within the last 15 years; (4) You have a tobacco smoking history of at least 20 pack years (packs per day multiplied by number of years you smoked); (5) You get a written order from a healthcare provider.  Glaucoma Screening: covered annually if you're considered high risk: (1) You have diabetes OR (2) Family history of glaucoma OR (3)  aged 50 and older OR (4)  American aged 65 and older  Osteoporosis Screening: covered every 2 years if you meet one of the following conditions: (1) You're estrogen deficient and at risk for osteoporosis based off medical history and other findings; (2) Have a vertebral abnormality; (3) On glucocorticoid therapy for more than 3 months; (4) Have primary hyperparathyroidism; (5) On osteoporosis medications and need to assess response to drug therapy.   Last bone density test (DXA Scan): Not on file.  HIV Screening: covered annually if you're between the age of 15-65. Also covered annually if you are younger than 15 and older than 65 with risk factors for HIV infection. For pregnant patients, it is covered up to 3 times per  pregnancy.    Immunizations:  Immunization Recommendations   Influenza Vaccine Annual influenza vaccination during flu season is recommended for all persons aged >= 6 months who do not have contraindications   Pneumococcal Vaccine   * Pneumococcal conjugate vaccine = PCV13 (Prevnar 13), PCV15 (Vaxneuvance), PCV20 (Prevnar 20)  * Pneumococcal polysaccharide vaccine = PPSV23 (Pneumovax) Adults 19-65 yo with certain risk factors or if 65+ yo  If never received any pneumonia vaccine: recommend Prevnar 20 (PCV20)  Give PCV20 if previously received 1 dose of PCV13 or PPSV23   Hepatitis B Vaccine 3 dose series if at intermediate or high risk (ex: diabetes, end stage renal disease, liver disease)   Respiratory syncytial virus (RSV) Vaccine - COVERED BY MEDICARE PART D  * RSVPreF3 (Arexvy) CDC recommends that adults 60 years of age and older may receive a single dose of RSV vaccine using shared clinical decision-making (SCDM)   Tetanus (Td) Vaccine - COST NOT COVERED BY MEDICARE PART B Following completion of primary series, a booster dose should be given every 10 years to maintain immunity against tetanus. Td may also be given as tetanus wound prophylaxis.   Tdap Vaccine - COST NOT COVERED BY MEDICARE PART B Recommended at least once for all adults. For pregnant patients, recommended with each pregnancy.   Shingles Vaccine (Shingrix) - COST NOT COVERED BY MEDICARE PART B  2 shot series recommended in those 19 years and older who have or will have weakened immune systems or those 50 years and older     Health Maintenance Due:      Topic Date Due   • Cervical Cancer Screening  06/05/2025   • Breast Cancer Screening: Mammogram  07/06/2025   • Lung Cancer Screening  06/04/2026   • Colorectal Cancer Screening  09/03/2030   • HIV Screening  Completed   • Hepatitis C Screening  Completed     Immunizations Due:      Topic Date Due   • Pneumococcal Vaccine: 50+ Years (1 of 2 - PCV) Never done   • COVID-19 Vaccine (6 - 2024-25  season) 09/01/2024   • Influenza Vaccine (1) 09/01/2025     Advance Directives   What are advance directives?  Advance directives are legal documents that state your wishes and plans for medical care. These plans are made ahead of time in case you lose your ability to make decisions for yourself. Advance directives can apply to any medical decision, such as the treatments you want, and if you want to donate organs.   What are the types of advance directives?  There are many types of advance directives, and each state has rules about how to use them. You may choose a combination of any of the following:  Living will:  This is a written record of the treatment you want. You can also choose which treatments you do not want, which to limit, and which to stop at a certain time. This includes surgery, medicine, IV fluid, and tube feedings.   Durable power of  for healthcare (DPAHC):  This is a written record that states who you want to make healthcare choices for you when you are unable to make them for yourself. This person, called a proxy, is usually a family member or a friend. You may choose more than 1 proxy.  Do not resuscitate (DNR) order:  A DNR order is used in case your heart stops beating or you stop breathing. It is a request not to have certain forms of treatment, such as CPR. A DNR order may be included in other types of advance directives.  Medical directive:  This covers the care that you want if you are in a coma, near death, or unable to make decisions for yourself. You can list the treatments you want for each condition. Treatment may include pain medicine, surgery, blood transfusions, dialysis, IV or tube feedings, and a ventilator (breathing machine).  Values history:  This document has questions about your views, beliefs, and how you feel and think about life. This information can help others choose the care that you would choose.  Why are advance directives important?  An advance directive  helps you control your care. Although spoken wishes may be used, it is better to have your wishes written down. Spoken wishes can be misunderstood, or not followed. Treatments may be given even if you do not want them. An advance directive may make it easier for your family to make difficult choices about your care.   Weight Management   Why it is important to manage your weight:  Being overweight increases your risk of health conditions such as heart disease, high blood pressure, type 2 diabetes, and certain types of cancer. It can also increase your risk for osteoarthritis, sleep apnea, and other respiratory problems. Aim for a slow, steady weight loss. Even a small amount of weight loss can lower your risk of health problems.  How to lose weight safely:  A safe and healthy way to lose weight is to eat fewer calories and get regular exercise. You can lose up about 1 pound a week by decreasing the number of calories you eat by 500 calories each day.   Healthy meal plan for weight management:  A healthy meal plan includes a variety of foods, contains fewer calories, and helps you stay healthy. A healthy meal plan includes the following:  Eat whole-grain foods more often.  A healthy meal plan should contain fiber. Fiber is the part of grains, fruits, and vegetables that is not broken down by your body. Whole-grain foods are healthy and provide extra fiber in your diet. Some examples of whole-grain foods are whole-wheat breads and pastas, oatmeal, brown rice, and bulgur.  Eat a variety of vegetables every day.  Include dark, leafy greens such as spinach, kale, brandie greens, and mustard greens. Eat yellow and orange vegetables such as carrots, sweet potatoes, and winter squash.   Eat a variety of fruits every day.  Choose fresh or canned fruit (canned in its own juice or light syrup) instead of juice. Fruit juice has very little or no fiber.  Eat low-fat dairy foods.  Drink fat-free (skim) milk or 1% milk. Eat  fat-free yogurt and low-fat cottage cheese. Try low-fat cheeses such as mozzarella and other reduced-fat cheeses.  Choose meat and other protein foods that are low in fat.  Choose beans or other legumes such as split peas or lentils. Choose fish, skinless poultry (chicken or turkey), or lean cuts of red meat (beef or pork). Before you cook meat or poultry, cut off any visible fat.   Use less fat and oil.  Try baking foods instead of frying them. Add less fat, such as margarine, sour cream, regular salad dressing and mayonnaise to foods. Eat fewer high-fat foods. Some examples of high-fat foods include french fries, doughnuts, ice cream, and cakes.  Eat fewer sweets.  Limit foods and drinks that are high in sugar. This includes candy, cookies, regular soda, and sweetened drinks.  Exercise:  Exercise at least 30 minutes per day on most days of the week. Some examples of exercise include walking, biking, dancing, and swimming. You can also fit in more physical activity by taking the stairs instead of the elevator or parking farther away from stores. Ask your healthcare provider about the best exercise plan for you.    © Copyright [employer] 2018 Information is for End User's use only and may not be sold, redistributed or otherwise used for commercial purposes. All illustrations and images included in CareNotes® are the copyrighted property of [employer] or [employer]

## 2025-07-18 NOTE — ASSESSMENT & PLAN NOTE
05/24 TSH 3.731  06/25 TSH 3.307  Current medications: Levothyroxine 125mcg   Continue current medication regimen

## 2025-07-18 NOTE — PROGRESS NOTES
Assessment and Plan:     Problem List Items Addressed This Visit          Cardiovascular and Mediastinum    Benign essential hypertension (Chronic)    Today's /70  goal bp 140/90, pt's bp is at goal  Current medication: Losartan 100mg, Lasix 20mg, Clonidine 0.3mg BID   Continue current medication regimen   Advised patient on symptoms of hypotension & severe HTN  Limit salt-intake & caffeine. DASH diet discussed, minimize stress level  Weight reduction efforts via improved diet & increased exercise            Respiratory    MAG (obstructive sleep apnea) (Chronic)    Auto bipap             Digestive    Yan's esophagus determined by biopsy (Chronic)    11/24 EGD   Normal esophagus, biopsied  Small amount of retained gastric contents  Gastritis, biopsied   Current medications: Pantoprazole 40mg            Endocrine    Hypothyroidism (Chronic)    05/24 TSH 3.731  06/25 TSH 3.307  Current medications: Levothyroxine 125mcg   Continue current medication regimen             Behavioral Health    Borderline personality disorder (HCC)    Medications currently managed by psychiatry             Other    BMI 45.0-49.9, adult ([facility]) - Primary    Wt Readings from Last 3 Encounters:   07/18/25 128 kg (283 lb)   07/03/25 132 kg (290 lb)   07/01/25 133 kg (292 lb 12.8 oz)            Prediabetes    05/24 Last A1C  5.4  06/25 Current A1C  6.1  Current medications: Metformin 750mg  Continue current medication regimen              Other Visit Diagnoses         Medicare annual wellness visit, subsequent          Encounter for screening mammogram for breast cancer        Relevant Orders    Mammo screening bilateral w 3d and cad      Cervical cancer screening        Relevant Orders    Ambulatory Referral to Obstetrics / Gynecology            Depression Screening and Follow-up Plan: Patient was screened for depression during today's encounter. They screened negative with a PHQ-9 score of 0.        Preventive health issues were  discussed with patient, and age appropriate screening tests were ordered as noted in patient's After Visit Summary.  Personalized health advice and appropriate referrals for health education or preventive services given if needed, as noted in patient's After Visit Summary.     History of Present Illness:     Patient presents for a Medicare Wellness Visit    AWV no concerns        Patient Care Team:  JHONATAN Armando as PCP - General (Nurse Practitioner)  Arnold Sims MD as PCP - PCP-[facility] ([facility])  Marilyn Diaz MD (Endocrinology)  Kyree Winkler MD (Orthopedic Surgery)  Zaira Talamantes DO (Obstetrics and Gynecology)  JHONATAN Lee as Nurse Practitioner (Hematology and Oncology)     Review of Systems:     Review of Systems   Constitutional:  Negative for activity change, chills, fatigue and fever.   HENT:  Negative for congestion, ear pain, rhinorrhea, sore throat and trouble swallowing.    Eyes:  Negative for pain and visual disturbance.   Respiratory:  Negative for cough, chest tightness and shortness of breath.    Cardiovascular:  Negative for chest pain, palpitations and leg swelling.   Gastrointestinal:  Negative for abdominal pain, constipation, diarrhea, nausea and vomiting.   Genitourinary:  Negative for difficulty urinating, dysuria, hematuria and urgency.   Musculoskeletal:  Negative for arthralgias and back pain.   Skin:  Negative for color change and rash.   Neurological:  Negative for dizziness, seizures, syncope and headaches.   Psychiatric/Behavioral:  Negative for dysphoric mood. The patient is not nervous/anxious.    All other systems reviewed and are negative.     Problem List:     Problem List[1]   Past Medical and Surgical History:     Past Medical History[2]  Past Surgical History[3]   Family History:     Family History[4]   Social History:     Social History[5]   Medications and Allergies:     Current Medications[6]  Allergies[7]   Immunizations:      Immunization History   Administered Date(s) Administered    COVID-19 PFIZER VACCINE 0.3 ML IM 03/31/2021, 04/21/2021, 12/13/2021, 05/09/2022    COVID-19 Pfizer vac (Edwar-sucrose, gray cap) 12 yr+ IM 05/09/2022    INFLUENZA 08/07/2014, 07/29/2015, 10/10/2018, 11/10/2022, 10/18/2023    Influenza Split 07/29/2015    Influenza, injectable, quadrivalent, preservative free 0.5 mL 10/18/2023    Influenza, recombinant, quadrivalent,injectable, preservative free 11/27/2018, 09/20/2021, 11/10/2022    Tdap 01/26/2009, 07/23/2018    Tuberculin Skin Test-PPD Intradermal 12/10/2018, 01/26/2022      Health Maintenance:         Topic Date Due    Cervical Cancer Screening  06/05/2025    Breast Cancer Screening: Mammogram  07/06/2025    Lung Cancer Screening  06/04/2026    Colorectal Cancer Screening  09/03/2030    HIV Screening  Completed    Hepatitis C Screening  Completed         Topic Date Due    Pneumococcal Vaccine: 50+ Years (1 of 2 - PCV) Never done    COVID-19 Vaccine (6 - 2024-25 season) 09/01/2024    Influenza Vaccine (1) 09/01/2025      Medicare Screening Tests and Risk Assessments:     Blanca is here for her Subsequent Wellness visit. Last Medicare Wellness visit information reviewed, patient interviewed and updates made to the record today.      Health Risk Assessment:   Patient rates overall health as good. Patient feels that their physical health rating is same. Patient is satisfied with their life. Eyesight was rated as same. Hearing was rated as same. Patient feels that their emotional and mental health rating is same. Patients states they are often angry. Patient states they are sometimes unusually tired/fatigued. Pain experienced in the last 7 days has been some. Patient's pain rating has been 7/10. Patient states that she has experienced no weight loss or gain in last 6 months.     Depression Screening:   PHQ-9 Score: 0      Fall Risk Screening:   In the past year, patient has experienced: no history of falling  in past year      Urinary Incontinence Screening:   Patient has not leaked urine accidently in the last six months.     Home Safety:  Patient does not have trouble with stairs inside or outside of their home. Patient has working smoke alarms and has working carbon monoxide detector. Home safety hazards include: none.     Nutrition:   Current diet is Regular.     Medications:   Patient is currently taking over-the-counter supplements. OTC medications include: see medication list. Patient is able to manage medications.     Activities of Daily Living (ADLs)/Instrumental Activities of Daily Living (IADLs):   Walk and transfer into and out of bed and chair?: Yes  Dress and groom yourself?: Yes    Bathe or shower yourself?: Yes    Feed yourself? Yes  Do your laundry/housekeeping?: Yes  Manage your money, pay your bills and track your expenses?: Yes  Make your own meals?: Yes    Do your own shopping?: Yes    Previous Hospitalizations:   Any hospitalizations or ED visits within the last 12 months?: Yes    How many hospitalizations have you had in the last year?: 1-2    Advance Care Planning:   Living will: No      Preventive Screenings      Cardiovascular Screening:    General: Screening Not Indicated and History Lipid Disorder      Diabetes Screening:     General: Screening Current      Colorectal Cancer Screening:     General: Screening Current      Cervical Cancer Screening:    General: Screening Current      Lung Cancer Screening:     General: Screening Current      Hepatitis C Screening:    General: Screening Current    Immunizations:  - Immunizations due: Prevnar 20 and Zoster (Shingrix)    Cardiovascular Risk Assessment:  Patient does not have underlying ASCVD and their cardiovascular risk was assessed today. Their cardiovascular risk factors include: hypertension, hyperlipidemia, overweight/obesity, drug use and pre-diabetes or diabetes.     The 10-year ASCVD risk score (Annel MATTHEWS, et al., 2019) is: 1.4%    Values  "used to calculate the score:      Age: 54 years      Clincally relevant sex: Female      Is Non- : No      Diabetic: No      Tobacco smoker: No      Systolic Blood Pressure: 118 mmHg      Is BP treated: Yes      HDL Cholesterol: 51 mg/dL      Total Cholesterol: 145 mg/dL    Time spent assessing cardiovascular risk: 10 minutes.     Screening, Brief Intervention, and Referral to Treatment (SBIRT)     Screening  Typical number of drinks in a day: 0  Typical number of drinks in a week: 0  Interpretation: Low risk drinking behavior.    Single Item Drug Screening:  How often have you used an illegal drug (including marijuana) or a prescription medication for non-medical reasons in the past year? never    Single Item Drug Screen Score: 0  Interpretation: Negative screen for possible drug use disorder    Brief Intervention  Alcohol & drug use screenings were reviewed. No concerns regarding substance use disorder identified.     Time Spent  Time spent screening/evaluating the patient for alcohol misuse: 0 minutes. Time spent providing alcohol/substance abuse assessment and intervention services: 0 minutes.    Other Counseling Topics:   Car/seat belt/driving safety, skin self-exam, sunscreen and calcium and vitamin D intake and regular weightbearing exercise.     No results found.     Physical Exam:     /70 (BP Location: Left arm, Patient Position: Sitting, Cuff Size: Adult)   Pulse 85   Temp 97.6 °F (36.4 °C) (Temporal)   Ht 5' 6\" (1.676 m)   Wt 128 kg (283 lb)   SpO2 98%   BMI 45.68 kg/m²     Physical Exam  Vitals and nursing note reviewed.   Constitutional:       General: She is not in acute distress.     Appearance: Normal appearance. She is well-developed and normal weight.   HENT:      Head: Normocephalic and atraumatic.      Right Ear: Tympanic membrane, ear canal and external ear normal. There is no impacted cerumen.      Left Ear: Tympanic membrane, ear canal and external ear " normal. There is no impacted cerumen.      Nose: Nose normal.      Mouth/Throat:      Mouth: Mucous membranes are moist.      Pharynx: Oropharynx is clear.     Eyes:      Extraocular Movements: Extraocular movements intact.      Conjunctiva/sclera: Conjunctivae normal.      Pupils: Pupils are equal, round, and reactive to light.       Cardiovascular:      Rate and Rhythm: Normal rate and regular rhythm.      Pulses: Normal pulses.      Heart sounds: Normal heart sounds. No murmur heard.  Pulmonary:      Effort: Pulmonary effort is normal. No respiratory distress.      Breath sounds: Normal breath sounds.   Abdominal:      General: Bowel sounds are normal.      Palpations: Abdomen is soft.      Tenderness: There is no abdominal tenderness.     Musculoskeletal:         General: Normal range of motion.      Cervical back: Normal range of motion and neck supple.      Right lower leg: No edema.      Left lower leg: No edema.     Skin:     General: Skin is warm and dry.      Capillary Refill: Capillary refill takes less than 2 seconds.     Neurological:      General: No focal deficit present.      Mental Status: She is alert and oriented to person, place, and time. Mental status is at baseline.     Psychiatric:         Mood and Affect: Mood normal.         Behavior: Behavior normal.         Thought Content: Thought content normal.         Judgment: Judgment normal.          JHONATAN Armando    Patient Instructions   Medicare Preventive Visit Patient Instructions  Thank you for completing your Welcome to Medicare Visit or Medicare Annual Wellness Visit today. Your next wellness visit will be due in one year (7/19/2026).  The screening/preventive services that you may require over the next 5-10 years are detailed below. Some tests may not apply to you based off risk factors and/or age. Screening tests ordered at today's visit but not completed yet may show as past due. Also, please note that scanned in results may not  display below.  Preventive Screenings:  Service Recommendations Previous Testing/Comments   Colorectal Cancer Screening  * Colonoscopy    * Fecal Occult Blood Test (FOBT)/Fecal Immunochemical Test (FIT)  * Fecal DNA/Cologuard Test  * Flexible Sigmoidoscopy Age: 45-75 years old   Colonoscopy: every 10 years (may be performed more frequently if at higher risk)  OR  FOBT/FIT: every 1 year  OR  Cologuard: every 3 years  OR  Sigmoidoscopy: every 5 years  Screening may be recommended earlier than age 45 if at higher risk for colorectal cancer. Also, an individualized decision between you and your healthcare provider will decide whether screening between the ages of 76-85 would be appropriate. Colonoscopy: 09/03/2020  FOBT/FIT: Not on file  Cologuard: Not on file  Sigmoidoscopy: Not on file    Screening Current     Breast Cancer Screening Age: 40+ years old  Frequency: every 1-2 years  Not required if history of left and right mastectomy Mammogram: 07/06/2023        Cervical Cancer Screening Between the ages of 21-29, pap smear recommended once every 3 years.   Between the ages of 30-65, can perform pap smear with HPV co-testing every 5 years.   Recommendations may differ for women with a history of total hysterectomy, cervical cancer, or abnormal pap smears in past. Pap Smear: 06/05/2024    Screening Current   Hepatitis C Screening Once for adults born between 1945 and 1965  More frequently in patients at high risk for Hepatitis C Hep C Antibody: 07/10/2019    Screening Current   Diabetes Screening 1-2 times per year if you're at risk for diabetes or have pre-diabetes Fasting glucose: 112 mg/dL (3/25/2025)  A1C: 6.1 % (6/5/2025)  Screening Current   Cholesterol Screening Once every 5 years if you don't have a lipid disorder. May order more often based on risk factors. Lipid panel: 03/25/2025    Screening Not Indicated  History Lipid Disorder     Other Preventive Screenings Covered by Medicare:  Abdominal Aortic Aneurysm  (AAA) Screening: covered once if your at risk. You're considered to be at risk if you have a family history of AAA.  Lung Cancer Screening: covers low dose CT scan once per year if you meet all of the following conditions: (1) Age 55-77; (2) No signs or symptoms of lung cancer; (3) Current smoker or have quit smoking within the last 15 years; (4) You have a tobacco smoking history of at least 20 pack years (packs per day multiplied by number of years you smoked); (5) You get a written order from a healthcare provider.  Glaucoma Screening: covered annually if you're considered high risk: (1) You have diabetes OR (2) Family history of glaucoma OR (3)  aged 50 and older OR (4)  American aged 65 and older  Osteoporosis Screening: covered every 2 years if you meet one of the following conditions: (1) You're estrogen deficient and at risk for osteoporosis based off medical history and other findings; (2) Have a vertebral abnormality; (3) On glucocorticoid therapy for more than 3 months; (4) Have primary hyperparathyroidism; (5) On osteoporosis medications and need to assess response to drug therapy.   Last bone density test (DXA Scan): Not on file.  HIV Screening: covered annually if you're between the age of 15-65. Also covered annually if you are younger than 15 and older than 65 with risk factors for HIV infection. For pregnant patients, it is covered up to 3 times per pregnancy.    Immunizations:  Immunization Recommendations   Influenza Vaccine Annual influenza vaccination during flu season is recommended for all persons aged >= 6 months who do not have contraindications   Pneumococcal Vaccine   * Pneumococcal conjugate vaccine = PCV13 (Prevnar 13), PCV15 (Vaxneuvance), PCV20 (Prevnar 20)  * Pneumococcal polysaccharide vaccine = PPSV23 (Pneumovax) Adults 19-65 yo with certain risk factors or if 65+ yo  If never received any pneumonia vaccine: recommend Prevnar 20 (PCV20)  Give PCV20 if  previously received 1 dose of PCV13 or PPSV23   Hepatitis B Vaccine 3 dose series if at intermediate or high risk (ex: diabetes, end stage renal disease, liver disease)   Respiratory syncytial virus (RSV) Vaccine - COVERED BY MEDICARE PART D  * RSVPreF3 (Arexvy) CDC recommends that adults 60 years of age and older may receive a single dose of RSV vaccine using shared clinical decision-making (SCDM)   Tetanus (Td) Vaccine - COST NOT COVERED BY MEDICARE PART B Following completion of primary series, a booster dose should be given every 10 years to maintain immunity against tetanus. Td may also be given as tetanus wound prophylaxis.   Tdap Vaccine - COST NOT COVERED BY MEDICARE PART B Recommended at least once for all adults. For pregnant patients, recommended with each pregnancy.   Shingles Vaccine (Shingrix) - COST NOT COVERED BY MEDICARE PART B  2 shot series recommended in those 19 years and older who have or will have weakened immune systems or those 50 years and older     Health Maintenance Due:      Topic Date Due    Cervical Cancer Screening  06/05/2025    Breast Cancer Screening: Mammogram  07/06/2025    Lung Cancer Screening  06/04/2026    Colorectal Cancer Screening  09/03/2030    HIV Screening  Completed    Hepatitis C Screening  Completed     Immunizations Due:      Topic Date Due    Pneumococcal Vaccine: 50+ Years (1 of 2 - PCV) Never done    COVID-19 Vaccine (6 - 2024-25 season) 09/01/2024    Influenza Vaccine (1) 09/01/2025     Advance Directives   What are advance directives?  Advance directives are legal documents that state your wishes and plans for medical care. These plans are made ahead of time in case you lose your ability to make decisions for yourself. Advance directives can apply to any medical decision, such as the treatments you want, and if you want to donate organs.   What are the types of advance directives?  There are many types of advance directives, and each state has rules about how  to use them. You may choose a combination of any of the following:  Living will:  This is a written record of the treatment you want. You can also choose which treatments you do not want, which to limit, and which to stop at a certain time. This includes surgery, medicine, IV fluid, and tube feedings.   Durable power of  for healthcare (DPAHC):  This is a written record that states who you want to make healthcare choices for you when you are unable to make them for yourself. This person, called a proxy, is usually a family member or a friend. You may choose more than 1 proxy.  Do not resuscitate (DNR) order:  A DNR order is used in case your heart stops beating or you stop breathing. It is a request not to have certain forms of treatment, such as CPR. A DNR order may be included in other types of advance directives.  Medical directive:  This covers the care that you want if you are in a coma, near death, or unable to make decisions for yourself. You can list the treatments you want for each condition. Treatment may include pain medicine, surgery, blood transfusions, dialysis, IV or tube feedings, and a ventilator (breathing machine).  Values history:  This document has questions about your views, beliefs, and how you feel and think about life. This information can help others choose the care that you would choose.  Why are advance directives important?  An advance directive helps you control your care. Although spoken wishes may be used, it is better to have your wishes written down. Spoken wishes can be misunderstood, or not followed. Treatments may be given even if you do not want them. An advance directive may make it easier for your family to make difficult choices about your care.   Weight Management   Why it is important to manage your weight:  Being overweight increases your risk of health conditions such as heart disease, high blood pressure, type 2 diabetes, and certain types of cancer. It can also  increase your risk for osteoarthritis, sleep apnea, and other respiratory problems. Aim for a slow, steady weight loss. Even a small amount of weight loss can lower your risk of health problems.  How to lose weight safely:  A safe and healthy way to lose weight is to eat fewer calories and get regular exercise. You can lose up about 1 pound a week by decreasing the number of calories you eat by 500 calories each day.   Healthy meal plan for weight management:  A healthy meal plan includes a variety of foods, contains fewer calories, and helps you stay healthy. A healthy meal plan includes the following:  Eat whole-grain foods more often.  A healthy meal plan should contain fiber. Fiber is the part of grains, fruits, and vegetables that is not broken down by your body. Whole-grain foods are healthy and provide extra fiber in your diet. Some examples of whole-grain foods are whole-wheat breads and pastas, oatmeal, brown rice, and bulgur.  Eat a variety of vegetables every day.  Include dark, leafy greens such as spinach, kale, brandie greens, and mustard greens. Eat yellow and orange vegetables such as carrots, sweet potatoes, and winter squash.   Eat a variety of fruits every day.  Choose fresh or canned fruit (canned in its own juice or light syrup) instead of juice. Fruit juice has very little or no fiber.  Eat low-fat dairy foods.  Drink fat-free (skim) milk or 1% milk. Eat fat-free yogurt and low-fat cottage cheese. Try low-fat cheeses such as mozzarella and other reduced-fat cheeses.  Choose meat and other protein foods that are low in fat.  Choose beans or other legumes such as split peas or lentils. Choose fish, skinless poultry (chicken or turkey), or lean cuts of red meat (beef or pork). Before you cook meat or poultry, cut off any visible fat.   Use less fat and oil.  Try baking foods instead of frying them. Add less fat, such as margarine, sour cream, regular salad dressing and mayonnaise to foods. Eat  fewer high-fat foods. Some examples of high-fat foods include french fries, doughnuts, ice cream, and cakes.  Eat fewer sweets.  Limit foods and drinks that are high in sugar. This includes candy, cookies, regular soda, and sweetened drinks.  Exercise:  Exercise at least 30 minutes per day on most days of the week. Some examples of exercise include walking, biking, dancing, and swimming. You can also fit in more physical activity by taking the stairs instead of the elevator or parking farther away from stores. Ask your healthcare provider about the best exercise plan for you.    © Copyright [employer] 2018 Information is for End User's use only and may not be sold, redistributed or otherwise used for commercial purposes. All illustrations and images included in CareNotes® are the copyrighted property of [employer] or [employer]              [1]   Patient Active Problem List  Diagnosis    Asthma    Yan's esophagus determined by biopsy    Benign essential hypertension    Schizoaffective disorder, bipolar type (HCC)    Chronic low back pain    Family history of renal cancer    Hypothyroidism    MAG (obstructive sleep apnea)    Overactive bladder    Major depressive disorder    Sjogren's syndrome (HCC)    COPD (chronic obstructive pulmonary disease) (HCC)    Mixed hyperlipidemia    BMI 45.0-49.9, adult ([facility])    Anticoagulation management encounter    Mood disorder (HCC)    Substance abuse (HCC)    Cognitive impairment    Anxiety    Borderline personality disorder ([facility])    Platelets decreased ([facility])    Chronic eczematous otitis externa of both ears    Chronic migraine without aura without status migrainosus, not intractable    Bilateral leg edema    Cervicalgia    History of pulmonary embolism    Restless leg syndrome    Lymphedema    History of DVT (deep vein thrombosis)    Dyskinesia, drug-induced    Cervical spondylosis    Prediabetes    Post-operative state    Altered mental status    Deep vein  "thrombosis (DVT) of lower extremity (HCC)    PE (pulmonary thromboembolism) ([facility])    Confusion    Hypothyroid    GERD (gastroesophageal reflux disease)    Pain and swelling of left lower leg    Other chest pain    Orthostasis    Cellulitis of lower extremity    Electrolyte abnormality   [2]   Past Medical History:  Diagnosis Date    Acute deep vein thrombosis of lower leg, left ([facility]) 10/16/2023    Acute heart failure, unspecified heart failure type ([facility]) 05/06/2024    Anxiety     Arthritis     oa cassandra knees    Asthma     good control- no medications    Yan's esophagus     Bipolar affective disorder ([facility])     Cannabis abuse with unspecified 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[facility])     Chronic pain disorder     lumbar    Contusion of elbow 12/21/2021    COPD (chronic obstructive pulmonary disease) ([facility])     CPAP (continuous positive airway pressure) dependence     DDD (degenerative disc disease), lumbar 04/09/2015    Degenerative disc disease at L5-S1 level     Deliberate self-cutting     9/15/22per pt has not done recently-- does see a therapist and psychiatrist regularly    Depression 09/16/2008    Diarrhea 01/06/2022    Disease of thyroid gland     hypo    MARTINEZ (dyspnea on exertion)     per pt \"has had this with exertion and not new\"    Drug overdose 10/28/2008    suicide attempt and again drug overdose 7/2022-- [facility] floor and than transferred to [facility]    Dysphagia     Dyspnea     Ecchymosis 01/06/2022    Edema     BLE    Elevated troponin 09/02/2023    Family history of blood clots     GERD (gastroesophageal reflux disease)     Grief 11/11/2023    Headache(784.0)     Headache, tension-type     Hemorrhage of rectum and anus 10/02/2017    Formatting of this note might be different from the original.  Added automatically from request for surgery [id number]    High blood pressure     History of COVID-19 12/30/2020    Symptoms started on 12/30/2020 and then got worse.  Today she is feeling a little bit better.  She " is a candidate for monoclonal antibody infusion and set for 1/6/21 @ 1pm at [facility]. 01/07/21 - telemedicine -     History of kidney stones     Hyperlipidemia     Hypertension     Ingrown toenail 12/02/2023    Intentional overdose (HCC) 09/27/2023    Intentional self-harm by unspecified sharp object, sequela (HCC) 02/09/2023    Irritable bowel syndrome     Knee pain, bilateral     Especially right    Knee pain, right 02/23/2022    Medial epicondylitis of elbow, left 04/03/2018    Formatting of this note might be different from the original.  Added automatically from request for surgery [id number]    Medial epicondylitis of right elbow 07/17/2023    Medical marijuana use     Memory difficulties 08/06/2020    Memory loss     Migraines     Muscle weakness     legs    Obesity     Other psychoactive substance abuse with unspecified psychoactive substance-induced disorder (HCC) 05/06/2024    Overactive bladder     Polysubstance abuse (HCC) 09/29/2023    Potential for cognitive impairment 06/17/2021    Primary osteoarthritis of both knees 08/08/2018    Pulmonary emboli (HCC)     Restrictive lung disease 02/01/2017    Last Assessment & Plan:   Formatting of this note might be different from the original.  I reviewed this problem throughout the rest of the note. Please refer to the other assessments for details.    Risk for falls     Sjogren's disease (HCC)     Sleep apnea     Stress incontinence     Suicidal deliberate poisoning (HCC) 07/13/2022    Suicidal ideations     Teeth missing     Toxic encephalopathy 11/08/2023    Tremor     per pt Tremors of Right Leg and both Arms    Visual impairment     Wears glasses    [3]   Past Surgical History:  Procedure Laterality Date    BREAST CYST EXCISION Right 1989    CARPAL TUNNEL RELEASE Left     CHOLECYSTECTOMY  05/2003    Laparoscopic    COLONOSCOPY      01/12/2009    DILATION AND CURETTAGE OF UTERUS      ELBOW SURGERY Left     x2. No hardware    ESOPHAGOGASTRODUODENOSCOPY       FOOT SURGERY Left     Plantar fasciotomy    HERNIA REPAIR      HYSTERECTOMY      laporoscopic, partial    KNEE ARTHROSCOPY Bilateral     OOPHORECTOMY Left 10/2015    VT CORRJ HLX VLGS BNCTY SESMDC JOINT ARTHRODESIS Right 8/2/2024    Procedure: RIGHT FOOT LAPIDUS BUNIONECTOMY, 2ND HAMMERTOE REPAIR, SECOND METATARSAL OSTEOTOMY WITH SECOND PLANTAR PLATE REPAIR;  Surgeon: Kaz Bautista DPM;  Location: [facility];  Service: Podiatry    VT GASTROCNEMIUS RECESSION Left 02/24/2021    Procedure: RECESSION GASTROC OPEN;  Surgeon: Nmoi Yang DPM;  Location:  MAIN OR;  Service: Podiatry    VT REMOVAL IMPLANT DEEP Right 5/14/2025    Procedure: FOOT REMOVAL OF PAINFUL HARDWARE, REVISION OF SCAR;  Surgeon: Kaz Bautista DPM;  Location: [facility];  Service: Podiatry    VT RPR UMBILICAL HRNA 5 YRS/> REDUCIBLE N/A 04/24/2019    Procedure: REPAIR HERNIA UMBILICAL LAPAROSCOPIC W/ ROBOTICS;  Surgeon: Anahi Colon MD;  Location: [facility];  Service: General    VT SPHINCTEROTOMY ANAL DIVISION SPHINCTER SPX N/A 08/31/2018    Procedure: EUA, LEFT PARTIAL INTERNAL SPHINCTEROTOMY;  Surgeon: Tien Reyes MD;  Location: [facility];  Service: Colorectal    VT TNOT ELBOW LATERAL/MEDIAL DEBRIDE OPEN TDN RPR Left 09/20/2022    Procedure: RELEASE EPICONDYLAR ELBOW MEDIAL;  Surgeon: Kyree Winkler MD;  [facility];  Service: Orthopedics    REDUCTION MAMMAPLASTY Bilateral 1999    REPAIR LACERATION Left     left hand-5/18/2009    REPLACEMENT TOTAL KNEE Right     ROTATOR CUFF REPAIR Left     TONSILECTOMY AND ADNOIDECTOMY      US GUIDED MSK PROCEDURE  04/22/2021    VASCULAR SURGERY      VEIN LIGATION Bilateral     WISDOM TOOTH EXTRACTION     [4]   Family History  Problem Relation Name Age of Onset    Kidney cancer Mother anastacio     Diabetes Mother anastacio     Depression Mother anastacio     Stroke Mother anastacio     Arthritis Mother anastacio     Cancer Mother anastacio     Psychiatric Illness Mother  anastacio     No Known Problems Father      No Known Problems Sister      No Known Problems Maternal Grandmother      No Known Problems Maternal Grandfather      No Known Problems Paternal Grandmother      No Known Problems Paternal Grandfather      Colon cancer Maternal Uncle      Colon cancer Maternal Uncle      Colon cancer Paternal Uncle      Colon cancer Family fx     Alcohol abuse Sister bharti     Asthma Sister bharti    [5]   Social History  Socioeconomic History    Marital status: Single   Tobacco Use    Smoking status: Former     Current packs/day: 0.00     Average packs/day: 2.0 packs/day for 33.0 years (66.0 ttl pk-yrs)     Types: Cigarettes     Start date: 1985     Quit date: 2018     Years since quittin.5     Passive exposure: Past    Smokeless tobacco: Never    Tobacco comments:     Started at age 16.   Vaping Use    Vaping status: Some Days    Substances: THC   Substance and Sexual Activity    Alcohol use: Not Currently     Comment: Recovering alcoholic    Drug use: Yes     Types: Marijuana     Comment: Former meth use, medicinal marijuana(medical card)    Sexual activity: Not Currently     Birth control/protection: Surgical     Comment: defer     Social Drivers of Health     Financial Resource Strain: Low Risk  (2024)    Overall Financial Resource Strain (CARDIA)     Difficulty of Paying Living Expenses: Not hard at all   Food Insecurity: No Food Insecurity (2025)    Nursing - Inadequate Food Risk Classification     Worried About Running Out of Food in the Last Year: Never true     Ran Out of Food in the Last Year: Never true     Ran Out of Food in the Last Year: Never true   Transportation Needs: No Transportation Needs (2025)    PRAPARE - Transportation     Lack of Transportation (Medical): No     Lack of Transportation (Non-Medical): No   Intimate Partner Violence: Unknown (2025)    Nursing IPS     Physically Hurt by Someone: No     Hurt or Threatened by Someone: No    Housing Stability: Unknown (7/18/2025)    Housing Stability Vital Sign     Unable to Pay for Housing in the Last Year: No     Homeless in the Last Year: No   [6]   Current Outpatient Medications   Medication Sig Dispense Refill    Aciphex 20 MG tablet Take by mouth as needed      albuterol (PROVENTIL HFA,VENTOLIN HFA) 90 mcg/act inhaler Inhale 2 puffs every 6 (six) hours as needed for wheezing      atoMOXetine (STRATTERA) 60 mg capsule Take 60 mg by mouth in the morning.      atorvastatin (LIPITOR) 80 mg tablet Take 1 tablet (80 mg total) by mouth daily 90 tablet 3    benztropine (COGENTIN) 1 mg tablet       buPROPion (WELLBUTRIN XL) 150 mg 24 hr tablet       buPROPion (WELLBUTRIN XL) 300 mg 24 hr tablet       Cholecalciferol (Vitamin D3) 125 MCG (5000 UT) capsule Take 1 capsule (5,000 Units total) by mouth daily 90 capsule 1    cloNIDine (CATAPRES) 0.3 mg tablet Take 1 tablet (0.3 mg total) by mouth 2 (two) times a day 180 tablet 3    furosemide (LASIX) 20 mg tablet TAKE 1 TABLET BY MOUTH DAILY 90 tablet 1    Invega Trinza 819 MG/2.63ML DWIGHT       levocetirizine (XYZAL) 5 MG tablet TAKE ONE TABLET BY MOUTH IN THE EVENING DAILY 90 tablet 1    levothyroxine 125 mcg tablet TAKE 1 TABLET BY MOUTH DAILY IN THE EARLY MORNING 90 tablet 1    losartan (COZAAR) 100 MG tablet TAKE 1 TABLET BY MOUTH DAILY 90 tablet 4    metFORMIN (GLUCOPHAGE-XR) 750 mg 24 hr tablet TAKE 1 TABLET BY MOUTH DAILY WITH BREAKFAST 90 tablet 1    naltrexone (REVIA) 50 mg tablet TAKE 1 TABLET BY MOUTH DAILY 90 tablet 4    naproxen (Naprosyn) 500 mg tablet Take 1 tablet (500 mg total) by mouth 2 (two) times a day with meals Take with food. 30 tablet 0    pilocarpine (SALAGEN) 5 mg tablet TAKE 1 TABLET (5 MG TOTAL) BY MOUTH THREE (THREE) TIMES A  tablet 4    rivaroxaban (Xarelto) 20 mg tablet Take 1 tablet (20 mg total) by mouth daily with breakfast 90 tablet 3    rOPINIRole (REQUIP) 1 mg tablet Take by mouth in the morning.      traZODone  "(DESYREL) 50 mg tablet Take 1 tablet (50 mg total) by mouth daily at bedtime as needed for sleep (insomnia) 30 tablet 0    cyanocobalamin (VITAMIN B-12) 1000 MCG tablet Take 1 tablet (1,000 mcg total) by mouth daily 90 tablet 1    OXcarbazepine (TRILEPTAL) 150 mg tablet Take 3 tablets (450 mg total) by mouth every 12 (twelve) hours 180 tablet 1    oxybutynin (DITROPAN) 5 mg tablet Take 1 tablet (5 mg total) by mouth 2 (two) times a day 120 tablet 1    pantoprazole (PROTONIX) 40 mg tablet Take 1 tablet (40 mg total) by mouth daily in the early morning (Patient not taking: Reported on 7/3/2025) 90 tablet 1    topiramate (TOPAMAX) 50 MG tablet Take 3 tablets (150 mg total) by mouth 2 (two) times a day for 1 day, THEN 2 tablets (100 mg total) 2 (two) times a day for 1 day, THEN 1.5 tablets (75 mg total) 2 (two) times a day for 1 day, THEN 1 tablet (50 mg total) 2 (two) times a day for 1 day, THEN 0.5 tablets (25 mg total) 2 (two) times a day for 1 day, THEN 0.5 tablets (25 mg total) daily for 1 day. 17 tablet 0     No current facility-administered medications for this visit.   [7]   Allergies  Allergen Reactions    Percocet [Oxycodone-Acetaminophen] Headache     Severe headaches   (denies issues with Tylenol)    Povidone Iodine Rash     Unsure if betadine or gauze post surgical. Got rash on leg.   Has  Had itchy rashes after every surgery prep and IV insertion    Chlorhexidine Rash     Per pt \"able to use the liquid soap--thinkd reaction from the sponges or wipes\"     "

## 2025-07-30 ENCOUNTER — TELEPHONE (OUTPATIENT)
Age: 55
End: 2025-07-30

## 2025-07-30 ENCOUNTER — OFFICE VISIT (OUTPATIENT)
Dept: FAMILY MEDICINE CLINIC | Facility: CLINIC | Age: 55
End: 2025-07-30
Payer: COMMERCIAL

## 2025-07-30 VITALS
HEART RATE: 124 BPM | OXYGEN SATURATION: 96 % | DIASTOLIC BLOOD PRESSURE: 94 MMHG | HEIGHT: 66 IN | WEIGHT: 272.8 LBS | BODY MASS INDEX: 43.84 KG/M2 | SYSTOLIC BLOOD PRESSURE: 168 MMHG | TEMPERATURE: 98 F

## 2025-07-30 DIAGNOSIS — L03.119 CELLULITIS OF LOWER EXTREMITY, UNSPECIFIED LATERALITY: Primary | ICD-10-CM

## 2025-07-30 PROCEDURE — 99213 OFFICE O/P EST LOW 20 MIN: CPT

## 2025-07-30 RX ORDER — AMMONIUM LACTATE 12 G/100G
CREAM TOPICAL AS NEEDED
Qty: 385 G | Refills: 0 | Status: SHIPPED | OUTPATIENT
Start: 2025-07-30

## 2025-07-30 RX ORDER — CEPHALEXIN 500 MG/1
500 CAPSULE ORAL 3 TIMES DAILY
Qty: 30 CAPSULE | Refills: 0 | Status: SHIPPED | OUTPATIENT
Start: 2025-07-30 | End: 2025-08-09

## 2025-07-31 ENCOUNTER — PROCEDURE VISIT (OUTPATIENT)
Dept: NEUROLOGY | Facility: CLINIC | Age: 55
End: 2025-07-31
Payer: COMMERCIAL

## 2025-07-31 ENCOUNTER — TELEPHONE (OUTPATIENT)
Dept: NEUROLOGY | Facility: CLINIC | Age: 55
End: 2025-07-31

## 2025-07-31 VITALS — DIASTOLIC BLOOD PRESSURE: 80 MMHG | SYSTOLIC BLOOD PRESSURE: 132 MMHG | TEMPERATURE: 97.8 F

## 2025-07-31 DIAGNOSIS — G43.709 CHRONIC MIGRAINE WITHOUT AURA WITHOUT STATUS MIGRAINOSUS, NOT INTRACTABLE: Primary | ICD-10-CM

## 2025-07-31 PROCEDURE — 64615 CHEMODENERV MUSC MIGRAINE: CPT | Performed by: PHYSICIAN ASSISTANT

## 2025-08-04 DIAGNOSIS — R05.2 SUBACUTE COUGH: ICD-10-CM

## 2025-08-06 RX ORDER — LEVOCETIRIZINE DIHYDROCHLORIDE 5 MG/1
5 TABLET, FILM COATED ORAL EVERY EVENING
Qty: 90 TABLET | Refills: 1 | Status: SHIPPED | OUTPATIENT
Start: 2025-08-06

## 2025-08-12 ENCOUNTER — DOCUMENTATION (OUTPATIENT)
Dept: CASE MANAGEMENT | Facility: HOSPITAL | Age: 55
End: 2025-08-12

## (undated) DEVICE — GLOVE SRG BIOGEL 7

## (undated) DEVICE — TROCAR: Brand: KII FIOS FIRST ENTRY

## (undated) DEVICE — PENCIL ELECTROSURG E-Z CLEAN -0035H

## (undated) DEVICE — TELFA NON-ADHERENT ABSORBENT DRESSING: Brand: TELFA

## (undated) DEVICE — NEEDLE 25G X 1 1/2

## (undated) DEVICE — OCCLUSIVE GAUZE STRIP,3% BISMUTH TRIBROMOPHENATE IN PETROLATUM BLEND: Brand: XEROFORM

## (undated) DEVICE — BLADELESS OBTURATOR: Brand: WECK VISTA

## (undated) DEVICE — ABDOMINAL PAD: Brand: DERMACEA

## (undated) DEVICE — SUT PDS II 0 CT-1 36 IN Z346H

## (undated) DEVICE — KERLIX BANDAGE ROLL: Brand: KERLIX

## (undated) DEVICE — PAD GROUNDING ADULT

## (undated) DEVICE — WEBRIL SYNTHETIC 6INX4YD

## (undated) DEVICE — SUT PROLENE 3-0 PS-2 18 IN 8687H

## (undated) DEVICE — UNDERGLOVE PROTEXIS  BLUE SZ 7

## (undated) DEVICE — SUT MONOCRYL 3-0 SH 27 IN Y416H

## (undated) DEVICE — [HIGH FLOW INSUFFLATOR,  DO NOT USE IF PACKAGE IS DAMAGED,  KEEP DRY,  KEEP AWAY FROM SUNLIGHT,  PROTECT FROM HEAT AND RADIOACTIVE SOURCES.]: Brand: PNEUMOSURE

## (undated) DEVICE — GAUZE SPONGES,16 PLY: Brand: CURITY

## (undated) DEVICE — Device

## (undated) DEVICE — GLOVE INDICATOR PI UNDERGLOVE SZ 7.5 BLUE

## (undated) DEVICE — SUT MONOCRYL 3-0 PS-2 27 IN Y427H

## (undated) DEVICE — X-RAY DETECTABLE SPONGES,16 PLY: Brand: VISTEC

## (undated) DEVICE — SCD SEQUENTIAL COMPRESSION COMFORT SLEEVE MEDIUM KNEE LENGTH: Brand: KENDALL SCD

## (undated) DEVICE — SPONGE 4 X 4 XRAY 16 PLY STRL LF RFD

## (undated) DEVICE — CUFF TOURNIQUET 18 X 4 IN QUICK CONNECT DISP 1 BLADDER

## (undated) DEVICE — MINOR PROCEDURE DRAPE: Brand: CONVERTORS

## (undated) DEVICE — ACE WRAP 6 IN UNSTERILE

## (undated) DEVICE — NEEDLE BLUNT 18 G X 1 1/2IN

## (undated) DEVICE — JOINT PREP INSTRUMENT KIT: Brand: ORTHOLOC™ 2

## (undated) DEVICE — STERILE POLYISOPRENE POWDER-FREE SURGICAL GLOVES WITH EMOLLIENT COATING: Brand: PROTEXIS

## (undated) DEVICE — INTENDED FOR TISSUE SEPARATION, AND OTHER PROCEDURES THAT REQUIRE A SHARP SURGICAL BLADE TO PUNCTURE OR CUT.: Brand: BARD-PARKER ® CARBON RIB-BACK BLADES

## (undated) DEVICE — NDL CNTR 20CT FM MAG: Brand: MEDLINE INDUSTRIES, INC.

## (undated) DEVICE — CHLORAPREP HI-LITE 26ML ORANGE

## (undated) DEVICE — MEDI-VAC YANK SUCT HNDL W/TPRD BULBOUS TIP: Brand: CARDINAL HEALTH

## (undated) DEVICE — ACE WRAP 4 IN UNSTERILE

## (undated) DEVICE — CRADLE EXTREMITY UNIVERSAL CONTOURED

## (undated) DEVICE — ALCOHOL ISOPROPYL BLUE

## (undated) DEVICE — GLOVE INDICATOR PI UNDERGLOVE SZ 6.5 BLUE

## (undated) DEVICE — SYRINGE 10ML LL

## (undated) DEVICE — SYRINGE 50ML LL

## (undated) DEVICE — BULB SYRINGE,IRRIGATION WITH PROTECTIVE CAP: Brand: DOVER

## (undated) DEVICE — GUIDEWIRE ORTHO STAINLESS STEEL 1.4MM THREAD GOLD

## (undated) DEVICE — GLOVE SRG BIOGEL 7.5

## (undated) DEVICE — SUT VICRYL 0 UR-6 27 IN J603H

## (undated) DEVICE — PAD CAST 4 IN COTTON NON STERILE

## (undated) DEVICE — SKIN MARKER DUAL TIP WITH RULER CAP, FLEXIBLE RULER AND LABELS: Brand: DEVON

## (undated) DEVICE — SHOE, POST-OPERATIVE: Brand: DEROYAL

## (undated) DEVICE — STERILE BETHLEHEM PLASTIC HAND: Brand: CARDINAL HEALTH

## (undated) DEVICE — THIN OFFSET (9.0 X 0.38 X 25.0MM)

## (undated) DEVICE — SUT MONOCRYL 4-0 PS-2 27 IN Y426H

## (undated) DEVICE — ALLENTOWN LAP CHOLE APP PACK: Brand: CARDINAL HEALTH

## (undated) DEVICE — SUT STRATAFIX SPIRAL PDS PLUS 0 CT-2 30CM SXPP1B405

## (undated) DEVICE — K-WIRE
Type: IMPLANTABLE DEVICE | Site: FOOT | Status: NON-FUNCTIONAL
Brand: ASNIS
Removed: 2024-08-02

## (undated) DEVICE — GLOVE SRG BIOGEL 6.5

## (undated) DEVICE — DISPOSABLE EQUIPMENT COVER: Brand: SMALL TOWEL DRAPE

## (undated) DEVICE — STERILE POLYISOPRENE POWDER-FREE SURGICAL GLOVES: Brand: PROTEXIS

## (undated) DEVICE — ANTIBACTERIAL UNDYED BRAIDED (POLYGLACTIN 910), SYNTHETIC ABSORBABLE SUTURE: Brand: COATED VICRYL

## (undated) DEVICE — DRESSING TELFA 2 X 3 IN STRL

## (undated) DEVICE — K-WIRE
Type: IMPLANTABLE DEVICE | Site: FOOT | Status: NON-FUNCTIONAL
Removed: 2024-08-02

## (undated) DEVICE — ACE WRAP 3 IN STERILE

## (undated) DEVICE — GLOVE INDICATOR PI UNDERGLOVE SZ 7 BLUE

## (undated) DEVICE — IMPERVIOUS STOCKINETTE: Brand: DEROYAL

## (undated) DEVICE — CUFF TOURNIQUET 24 X 4 IN QUICK CONNECT DISP 1BLA

## (undated) DEVICE — CURITY NON-ADHERENT STRIPS: Brand: CURITY

## (undated) DEVICE — INTENDED FOR TISSUE SEPARATION, AND OTHER PROCEDURES THAT REQUIRE A SHARP SURGICAL BLADE TO PUNCTURE OR CUT.: Brand: BARD-PARKER ® SAFETYLOCK CARBON RIB-BACK BLADES

## (undated) DEVICE — STRETCH BANDAGE: Brand: CURITY

## (undated) DEVICE — SUT VICRYL 0 CT-1 36 IN J946H

## (undated) DEVICE — DISPOSABLE OR TOWEL: Brand: CARDINAL HEALTH

## (undated) DEVICE — ARM DRAPE

## (undated) DEVICE — ABSORBABLE WOUND CLOSURE DEVICE: Brand: V-LOC 90

## (undated) DEVICE — DRAPE C-ARMOUR

## (undated) DEVICE — SUT ETHILON 3-0 PS-1 18 IN 1663H

## (undated) DEVICE — PAD CAST 6 IN COTTON NON STERILE

## (undated) DEVICE — 3M™ STERI-STRIP™ REINFORCED ADHESIVE SKIN CLOSURES, R1547, 1/2 IN X 4 IN (12 MM X 100 MM), 6 STRIPS/ENVELOPE: Brand: 3M™ STERI-STRIP™

## (undated) DEVICE — NEEDLE HYPO 22G X 1-1/2 IN

## (undated) DEVICE — SINGLE PORT MANIFOLD: Brand: NEPTUNE 2

## (undated) DEVICE — MONOPOLAR CURVED SCISSORS: Brand: ENDOWRIST

## (undated) DEVICE — DRAPE C-ARM X-RAY

## (undated) DEVICE — DRAPE EQUIPMENT RF WAND

## (undated) DEVICE — HAMMERTOE K-WIRE
Type: IMPLANTABLE DEVICE | Site: TOE | Status: NON-FUNCTIONAL
Brand: PHALINX
Removed: 2024-08-02

## (undated) DEVICE — PADDING CAST 4 IN  COTTON STRL

## (undated) DEVICE — HAMMERTOE DRILL: Brand: PHALINX

## (undated) DEVICE — MEGA SUTURECUT ND: Brand: ENDOWRIST

## (undated) DEVICE — BETHLEHEM UNIVERSAL  MIONR EXT: Brand: CARDINAL HEALTH

## (undated) DEVICE — C-ARM: Brand: UNBRANDED

## (undated) DEVICE — TIP COVER ACCESSORY

## (undated) DEVICE — CADIERE FORCEPS: Brand: ENDOWRIST

## (undated) DEVICE — DRILL BIT

## (undated) DEVICE — TUBING SUCTION 5MM X 12 FT

## (undated) DEVICE — COBAN 4 IN STERILE

## (undated) DEVICE — WEBRIL SYNTHETIC 4INX4YD

## (undated) DEVICE — SUT VICRYL 3-0 SH 27 IN J416H

## (undated) DEVICE — CURITY STRETCH BANDAGE: Brand: CURITY

## (undated) DEVICE — ASTOUND STANDARD SURGICAL GOWN, XL: Brand: CONVERTORS

## (undated) DEVICE — INTENDED FOR TISSUE SEPARATION, AND OTHER PROCEDURES THAT REQUIRE A SHARP SURGICAL BLADE TO PUNCTURE OR CUT.: Brand: BARD-PARKER SAFETY BLADES SIZE 15, STERILE

## (undated) DEVICE — SUT PROLENE 3-0 SH 36 IN 8522H

## (undated) DEVICE — SYRINGE 10ML LL CONTROL TOP

## (undated) DEVICE — SPONGE LAP 18 X 18 IN STRL RFD

## (undated) DEVICE — MINI BLADE ROUND TIP SHARP ON ONE SIDE

## (undated) DEVICE — SUT VLOC 90 3-0 V-20 9IN VLOCM0644

## (undated) DEVICE — STOCKINETTE,IMPERVIOUS,12X48,STERILE: Brand: MEDLINE

## (undated) DEVICE — BASIC PACK: Brand: CONVERTORS

## (undated) DEVICE — SUT VICRYL 2-0 SH 27 IN UNDYED J417H

## (undated) DEVICE — ADHESIVE SKN CLSR HISTOACRYL FLEX 0.5ML LF

## (undated) DEVICE — COUNTERSINK 4MM

## (undated) DEVICE — COLUMN DRAPE

## (undated) DEVICE — GLOVE SRG LF STRL BGL SKNSNS 7 PF

## (undated) DEVICE — PMI DISPOSABLE PUNCTURE CLOSURE DEVICE / SUTURE GRASPER: Brand: PMI

## (undated) DEVICE — INTENDED FOR TISSUE SEPARATION, AND OTHER PROCEDURES THAT REQUIRE A SHARP SURGICAL BLADE TO PUNCTURE OR CUT.: Brand: BARD-PARKER SAFETY BLADES SIZE 11, STERILE

## (undated) DEVICE — LUBRICANT INST ELECTROLUBE ANTISTK WO PAD

## (undated) DEVICE — DRILL BIT CANN 2.7MM

## (undated) DEVICE — SPONGE PVP SCRUB WING STERILE

## (undated) DEVICE — PRECISION THIN (5.5 X 0.38 X 18.0MM)

## (undated) DEVICE — SUT VICRYL 2-0 CT-2 27 IN J269H